# Patient Record
Sex: FEMALE | Race: ASIAN | NOT HISPANIC OR LATINO | URBAN - METROPOLITAN AREA
[De-identification: names, ages, dates, MRNs, and addresses within clinical notes are randomized per-mention and may not be internally consistent; named-entity substitution may affect disease eponyms.]

---

## 2017-07-21 ENCOUNTER — INPATIENT (INPATIENT)
Facility: HOSPITAL | Age: 72
LOS: 4 days | Discharge: NURSING, REHAB OR LONG-TERM CARE | DRG: 871 | End: 2017-07-26
Attending: FAMILY MEDICINE | Admitting: FAMILY MEDICINE
Payer: MEDICARE

## 2017-07-21 VITALS
HEART RATE: 150 BPM | SYSTOLIC BLOOD PRESSURE: 124 MMHG | OXYGEN SATURATION: 90 % | TEMPERATURE: 100 F | RESPIRATION RATE: 38 BRPM | DIASTOLIC BLOOD PRESSURE: 74 MMHG

## 2017-07-21 DIAGNOSIS — R11.10 VOMITING, UNSPECIFIED: ICD-10-CM

## 2017-07-21 DIAGNOSIS — R50.9 FEVER, UNSPECIFIED: ICD-10-CM

## 2017-07-21 DIAGNOSIS — A41.9 SEPSIS, UNSPECIFIED ORGANISM: ICD-10-CM

## 2017-07-21 DIAGNOSIS — K59.00 CONSTIPATION, UNSPECIFIED: ICD-10-CM

## 2017-07-21 DIAGNOSIS — J18.9 PNEUMONIA, UNSPECIFIED ORGANISM: ICD-10-CM

## 2017-07-21 DIAGNOSIS — R06.00 DYSPNEA, UNSPECIFIED: ICD-10-CM

## 2017-07-21 DIAGNOSIS — J96.90 RESPIRATORY FAILURE, UNSPECIFIED, UNSPECIFIED WHETHER WITH HYPOXIA OR HYPERCAPNIA: ICD-10-CM

## 2017-07-21 DIAGNOSIS — D72.829 ELEVATED WHITE BLOOD CELL COUNT, UNSPECIFIED: ICD-10-CM

## 2017-07-21 LAB
ALBUMIN SERPL ELPH-MCNC: 2.6 G/DL — LOW (ref 3.3–5)
ALBUMIN SERPL ELPH-MCNC: 3.6 G/DL — SIGNIFICANT CHANGE UP (ref 3.3–5)
ALP SERPL-CCNC: 67 U/L — SIGNIFICANT CHANGE UP (ref 40–120)
ALP SERPL-CCNC: 99 U/L — SIGNIFICANT CHANGE UP (ref 40–120)
ALT FLD-CCNC: 29 U/L — SIGNIFICANT CHANGE UP (ref 12–78)
ALT FLD-CCNC: 38 U/L — SIGNIFICANT CHANGE UP (ref 12–78)
ANION GAP SERPL CALC-SCNC: 15 MMOL/L — SIGNIFICANT CHANGE UP (ref 5–17)
ANION GAP SERPL CALC-SCNC: 7 MMOL/L — SIGNIFICANT CHANGE UP (ref 5–17)
APPEARANCE UR: ABNORMAL
AST SERPL-CCNC: 38 U/L — HIGH (ref 15–37)
AST SERPL-CCNC: 42 U/L — HIGH (ref 15–37)
BACTERIA # UR AUTO: ABNORMAL
BASE EXCESS BLDA CALC-SCNC: -11.2 MMOL/L — LOW (ref -2–2)
BASE EXCESS BLDA CALC-SCNC: -7 MMOL/L — LOW (ref -2–2)
BASOPHILS # BLD AUTO: 0 K/UL — SIGNIFICANT CHANGE UP (ref 0–0.2)
BASOPHILS # BLD AUTO: 0.1 K/UL — SIGNIFICANT CHANGE UP (ref 0–0.2)
BASOPHILS NFR BLD AUTO: 0.4 % — SIGNIFICANT CHANGE UP (ref 0–2)
BASOPHILS NFR BLD AUTO: 0.4 % — SIGNIFICANT CHANGE UP (ref 0–2)
BILIRUB SERPL-MCNC: 0.9 MG/DL — SIGNIFICANT CHANGE UP (ref 0.2–1.2)
BILIRUB SERPL-MCNC: 1.1 MG/DL — SIGNIFICANT CHANGE UP (ref 0.2–1.2)
BILIRUB UR-MCNC: NEGATIVE — SIGNIFICANT CHANGE UP
BLOOD GAS COMMENTS ARTERIAL: SIGNIFICANT CHANGE UP
BLOOD GAS COMMENTS ARTERIAL: SIGNIFICANT CHANGE UP
BUN SERPL-MCNC: 14 MG/DL — SIGNIFICANT CHANGE UP (ref 7–23)
BUN SERPL-MCNC: 19 MG/DL — SIGNIFICANT CHANGE UP (ref 7–23)
CALCIUM SERPL-MCNC: 7.1 MG/DL — LOW (ref 8.5–10.1)
CALCIUM SERPL-MCNC: 8.6 MG/DL — SIGNIFICANT CHANGE UP (ref 8.5–10.1)
CHLORIDE SERPL-SCNC: 111 MMOL/L — HIGH (ref 96–108)
CHLORIDE SERPL-SCNC: 115 MMOL/L — HIGH (ref 96–108)
CK MB BLD-MCNC: 2.5 % — SIGNIFICANT CHANGE UP (ref 0–3.5)
CK MB CFR SERPL CALC: 13 NG/ML — HIGH (ref 0–3.6)
CK SERPL-CCNC: 333 U/L — HIGH (ref 26–192)
CK SERPL-CCNC: 514 U/L — HIGH (ref 26–192)
CO2 SERPL-SCNC: 16 MMOL/L — LOW (ref 22–31)
CO2 SERPL-SCNC: 22 MMOL/L — SIGNIFICANT CHANGE UP (ref 22–31)
COLOR SPEC: YELLOW — SIGNIFICANT CHANGE UP
CREAT SERPL-MCNC: 0.88 MG/DL — SIGNIFICANT CHANGE UP (ref 0.5–1.3)
CREAT SERPL-MCNC: 1.3 MG/DL — SIGNIFICANT CHANGE UP (ref 0.5–1.3)
DIFF PNL FLD: ABNORMAL
EOSINOPHIL # BLD AUTO: 0 K/UL — SIGNIFICANT CHANGE UP (ref 0–0.5)
EOSINOPHIL # BLD AUTO: 0 K/UL — SIGNIFICANT CHANGE UP (ref 0–0.5)
EOSINOPHIL NFR BLD AUTO: 0 % — SIGNIFICANT CHANGE UP (ref 0–6)
EOSINOPHIL NFR BLD AUTO: 0.3 % — SIGNIFICANT CHANGE UP (ref 0–6)
EPI CELLS # UR: SIGNIFICANT CHANGE UP
GLUCOSE SERPL-MCNC: 52 MG/DL — LOW (ref 70–99)
GLUCOSE SERPL-MCNC: 78 MG/DL — SIGNIFICANT CHANGE UP (ref 70–99)
GLUCOSE UR QL: NEGATIVE — SIGNIFICANT CHANGE UP
GRAM STN FLD: SIGNIFICANT CHANGE UP
HCO3 BLDA-SCNC: 16 MMOL/L — LOW (ref 23–27)
HCO3 BLDA-SCNC: 20 MMOL/L — LOW (ref 23–27)
HCT VFR BLD CALC: 33.6 % — LOW (ref 34.5–45)
HCT VFR BLD CALC: 45.1 % — HIGH (ref 34.5–45)
HGB BLD-MCNC: 10.8 G/DL — LOW (ref 11.5–15.5)
HGB BLD-MCNC: 14.7 G/DL — SIGNIFICANT CHANGE UP (ref 11.5–15.5)
HOROWITZ INDEX BLDA+IHG-RTO: 100 — SIGNIFICANT CHANGE UP
HOROWITZ INDEX BLDA+IHG-RTO: 30 — SIGNIFICANT CHANGE UP
INR BLD: 1.18 RATIO — HIGH (ref 0.88–1.16)
KETONES UR-MCNC: NEGATIVE — SIGNIFICANT CHANGE UP
LACTATE SERPL-SCNC: 1.9 MMOL/L — SIGNIFICANT CHANGE UP (ref 0.7–2)
LACTATE SERPL-SCNC: 2.4 MMOL/L — HIGH (ref 0.7–2)
LACTATE SERPL-SCNC: 3.2 MMOL/L — HIGH (ref 0.7–2)
LACTATE SERPL-SCNC: 6.9 MMOL/L — CRITICAL HIGH (ref 0.7–2)
LEUKOCYTE ESTERASE UR-ACNC: ABNORMAL
LYMPHOCYTES # BLD AUTO: 1 K/UL — SIGNIFICANT CHANGE UP (ref 1–3.3)
LYMPHOCYTES # BLD AUTO: 1.8 K/UL — SIGNIFICANT CHANGE UP (ref 1–3.3)
LYMPHOCYTES # BLD AUTO: 18.4 % — SIGNIFICANT CHANGE UP (ref 13–44)
LYMPHOCYTES # BLD AUTO: 5.6 % — LOW (ref 13–44)
MAGNESIUM SERPL-MCNC: 2.2 MG/DL — SIGNIFICANT CHANGE UP (ref 1.6–2.6)
MCHC RBC-ENTMCNC: 27.9 PG — SIGNIFICANT CHANGE UP (ref 27–34)
MCHC RBC-ENTMCNC: 28.2 PG — SIGNIFICANT CHANGE UP (ref 27–34)
MCHC RBC-ENTMCNC: 32.2 GM/DL — SIGNIFICANT CHANGE UP (ref 32–36)
MCHC RBC-ENTMCNC: 32.6 GM/DL — SIGNIFICANT CHANGE UP (ref 32–36)
MCV RBC AUTO: 86.4 FL — SIGNIFICANT CHANGE UP (ref 80–100)
MCV RBC AUTO: 86.8 FL — SIGNIFICANT CHANGE UP (ref 80–100)
MONOCYTES # BLD AUTO: 0.9 K/UL — SIGNIFICANT CHANGE UP (ref 0–0.9)
MONOCYTES # BLD AUTO: 1 K/UL — HIGH (ref 0–0.9)
MONOCYTES NFR BLD AUTO: 10.1 % — HIGH (ref 1–9)
MONOCYTES NFR BLD AUTO: 5.1 % — SIGNIFICANT CHANGE UP (ref 1–9)
NEUTROPHILS # BLD AUTO: 16.4 K/UL — HIGH (ref 1.8–7.4)
NEUTROPHILS # BLD AUTO: 7 K/UL — SIGNIFICANT CHANGE UP (ref 1.8–7.4)
NEUTROPHILS NFR BLD AUTO: 70.8 % — SIGNIFICANT CHANGE UP (ref 43–77)
NEUTROPHILS NFR BLD AUTO: 88.9 % — HIGH (ref 43–77)
NITRITE UR-MCNC: NEGATIVE — SIGNIFICANT CHANGE UP
NT-PROBNP SERPL-SCNC: 932 PG/ML — HIGH (ref 0–125)
OB PNL STL: POSITIVE
PCO2 BLDA: 25 MMHG — LOW (ref 32–46)
PCO2 BLDA: 33 MMHG — SIGNIFICANT CHANGE UP (ref 32–46)
PH BLDA: 7.26 — LOW (ref 7.35–7.45)
PH BLDA: 7.43 — SIGNIFICANT CHANGE UP (ref 7.35–7.45)
PH UR: 9 — HIGH (ref 5–8)
PHOSPHATE SERPL-MCNC: 2.4 MG/DL — LOW (ref 2.5–4.5)
PLATELET # BLD AUTO: 188 K/UL — SIGNIFICANT CHANGE UP (ref 150–400)
PLATELET # BLD AUTO: 330 K/UL — SIGNIFICANT CHANGE UP (ref 150–400)
PO2 BLDA: 137 MMHG — HIGH (ref 74–108)
PO2 BLDA: 421 MMHG — HIGH (ref 74–108)
POTASSIUM SERPL-MCNC: 3.5 MMOL/L — SIGNIFICANT CHANGE UP (ref 3.5–5.3)
POTASSIUM SERPL-MCNC: 4 MMOL/L — SIGNIFICANT CHANGE UP (ref 3.5–5.3)
POTASSIUM SERPL-SCNC: 3.5 MMOL/L — SIGNIFICANT CHANGE UP (ref 3.5–5.3)
POTASSIUM SERPL-SCNC: 4 MMOL/L — SIGNIFICANT CHANGE UP (ref 3.5–5.3)
PROCALCITONIN SERPL-MCNC: 0.31 NG/ML — HIGH (ref 0–0.04)
PROT SERPL-MCNC: 6.1 G/DL — SIGNIFICANT CHANGE UP (ref 6–8.3)
PROT SERPL-MCNC: 8.5 G/DL — HIGH (ref 6–8.3)
PROT UR-MCNC: 75 MG/DL
PROTHROM AB SERPL-ACNC: 12.9 SEC — HIGH (ref 9.8–12.7)
RBC # BLD: 3.86 M/UL — SIGNIFICANT CHANGE UP (ref 3.8–5.2)
RBC # BLD: 5.22 M/UL — HIGH (ref 3.8–5.2)
RBC # FLD: 13.8 % — SIGNIFICANT CHANGE UP (ref 10.3–14.5)
RBC # FLD: 14.1 % — SIGNIFICANT CHANGE UP (ref 10.3–14.5)
RBC CASTS # UR COMP ASSIST: SIGNIFICANT CHANGE UP /HPF (ref 0–4)
SAO2 % BLDA: 100 % — HIGH (ref 92–96)
SAO2 % BLDA: 99 % — HIGH (ref 92–96)
SODIUM SERPL-SCNC: 142 MMOL/L — SIGNIFICANT CHANGE UP (ref 135–145)
SODIUM SERPL-SCNC: 144 MMOL/L — SIGNIFICANT CHANGE UP (ref 135–145)
SP GR SPEC: 1.01 — SIGNIFICANT CHANGE UP (ref 1.01–1.02)
SPECIMEN SOURCE: SIGNIFICANT CHANGE UP
TRI-PHOS CRY UR QL COMP ASSIST: ABNORMAL
TROPONIN I SERPL-MCNC: 0.03 NG/ML — SIGNIFICANT CHANGE UP (ref 0.01–0.04)
TROPONIN I SERPL-MCNC: <.015 NG/ML — SIGNIFICANT CHANGE UP (ref 0.01–0.04)
UROBILINOGEN FLD QL: NEGATIVE — SIGNIFICANT CHANGE UP
WBC # BLD: 18.4 K/UL — HIGH (ref 3.8–10.5)
WBC # BLD: 9.8 K/UL — SIGNIFICANT CHANGE UP (ref 3.8–10.5)
WBC # FLD AUTO: 18.4 K/UL — HIGH (ref 3.8–10.5)
WBC # FLD AUTO: 9.8 K/UL — SIGNIFICANT CHANGE UP (ref 3.8–10.5)
WBC UR QL: SIGNIFICANT CHANGE UP

## 2017-07-21 PROCEDURE — 99291 CRITICAL CARE FIRST HOUR: CPT

## 2017-07-21 PROCEDURE — 71250 CT THORAX DX C-: CPT | Mod: 26

## 2017-07-21 PROCEDURE — 71010: CPT | Mod: 26

## 2017-07-21 PROCEDURE — 99292 CRITICAL CARE ADDL 30 MIN: CPT

## 2017-07-21 PROCEDURE — 93010 ELECTROCARDIOGRAM REPORT: CPT

## 2017-07-21 PROCEDURE — 74000: CPT | Mod: 26

## 2017-07-21 PROCEDURE — 74176 CT ABD & PELVIS W/O CONTRAST: CPT | Mod: 26

## 2017-07-21 RX ORDER — SODIUM CHLORIDE 9 MG/ML
1000 INJECTION INTRAMUSCULAR; INTRAVENOUS; SUBCUTANEOUS ONCE
Qty: 0 | Refills: 0 | Status: COMPLETED | OUTPATIENT
Start: 2017-07-21 | End: 2017-07-21

## 2017-07-21 RX ORDER — DEXTROSE 50 % IN WATER 50 %
25 SYRINGE (ML) INTRAVENOUS ONCE
Qty: 0 | Refills: 0 | Status: DISCONTINUED | OUTPATIENT
Start: 2017-07-21 | End: 2017-07-26

## 2017-07-21 RX ORDER — ALBUTEROL 90 UG/1
2 AEROSOL, METERED ORAL EVERY 6 HOURS
Qty: 0 | Refills: 0 | Status: DISCONTINUED | OUTPATIENT
Start: 2017-07-21 | End: 2017-07-22

## 2017-07-21 RX ORDER — DEXTROSE 10 % IN WATER 10 %
500 INTRAVENOUS SOLUTION INTRAVENOUS
Qty: 0 | Refills: 0 | Status: DISCONTINUED | OUTPATIENT
Start: 2017-07-21 | End: 2017-07-22

## 2017-07-21 RX ORDER — PANTOPRAZOLE SODIUM 20 MG/1
40 TABLET, DELAYED RELEASE ORAL EVERY 12 HOURS
Qty: 0 | Refills: 0 | Status: DISCONTINUED | OUTPATIENT
Start: 2017-07-21 | End: 2017-07-24

## 2017-07-21 RX ORDER — SCOPALAMINE 1 MG/3D
1.5 PATCH, EXTENDED RELEASE TRANSDERMAL
Qty: 0 | Refills: 0 | Status: DISCONTINUED | OUTPATIENT
Start: 2017-07-21 | End: 2017-07-21

## 2017-07-21 RX ORDER — DEXTROSE 50 % IN WATER 50 %
12.5 SYRINGE (ML) INTRAVENOUS ONCE
Qty: 0 | Refills: 0 | Status: DISCONTINUED | OUTPATIENT
Start: 2017-07-21 | End: 2017-07-26

## 2017-07-21 RX ORDER — DEXTROSE 50 % IN WATER 50 %
12.5 SYRINGE (ML) INTRAVENOUS ONCE
Qty: 0 | Refills: 0 | Status: COMPLETED | OUTPATIENT
Start: 2017-07-21 | End: 2017-07-21

## 2017-07-21 RX ORDER — SODIUM CHLORIDE 9 MG/ML
1000 INJECTION, SOLUTION INTRAVENOUS
Qty: 0 | Refills: 0 | Status: DISCONTINUED | OUTPATIENT
Start: 2017-07-21 | End: 2017-07-24

## 2017-07-21 RX ORDER — IOHEXOL 300 MG/ML
500 INJECTION, SOLUTION INTRAVENOUS
Qty: 0 | Refills: 0 | Status: COMPLETED | OUTPATIENT
Start: 2017-07-21 | End: 2017-07-21

## 2017-07-21 RX ORDER — PIPERACILLIN AND TAZOBACTAM 4; .5 G/20ML; G/20ML
3.38 INJECTION, POWDER, LYOPHILIZED, FOR SOLUTION INTRAVENOUS ONCE
Qty: 0 | Refills: 0 | Status: COMPLETED | OUTPATIENT
Start: 2017-07-21 | End: 2017-07-21

## 2017-07-21 RX ORDER — FENTANYL CITRATE 50 UG/ML
1 INJECTION INTRAVENOUS
Qty: 0 | Refills: 0 | Status: DISCONTINUED | OUTPATIENT
Start: 2017-07-21 | End: 2017-07-26

## 2017-07-21 RX ORDER — DEXTROSE 50 % IN WATER 50 %
25 SYRINGE (ML) INTRAVENOUS ONCE
Qty: 0 | Refills: 0 | Status: COMPLETED | OUTPATIENT
Start: 2017-07-21 | End: 2017-07-21

## 2017-07-21 RX ORDER — ACETAMINOPHEN 500 MG
650 TABLET ORAL ONCE
Qty: 0 | Refills: 0 | Status: COMPLETED | OUTPATIENT
Start: 2017-07-21 | End: 2017-07-21

## 2017-07-21 RX ORDER — VANCOMYCIN HCL 1 G
1000 VIAL (EA) INTRAVENOUS ONCE
Qty: 0 | Refills: 0 | Status: COMPLETED | OUTPATIENT
Start: 2017-07-21 | End: 2017-07-21

## 2017-07-21 RX ORDER — VANCOMYCIN HCL 1 G
1000 VIAL (EA) INTRAVENOUS EVERY 12 HOURS
Qty: 0 | Refills: 0 | Status: DISCONTINUED | OUTPATIENT
Start: 2017-07-21 | End: 2017-07-21

## 2017-07-21 RX ORDER — SODIUM CHLORIDE 0.65 %
1 AEROSOL, SPRAY (ML) NASAL THREE TIMES A DAY
Qty: 0 | Refills: 0 | Status: DISCONTINUED | OUTPATIENT
Start: 2017-07-21 | End: 2017-07-26

## 2017-07-21 RX ORDER — PIPERACILLIN AND TAZOBACTAM 4; .5 G/20ML; G/20ML
3.38 INJECTION, POWDER, LYOPHILIZED, FOR SOLUTION INTRAVENOUS EVERY 8 HOURS
Qty: 0 | Refills: 0 | Status: COMPLETED | OUTPATIENT
Start: 2017-07-21 | End: 2017-07-25

## 2017-07-21 RX ORDER — PANTOPRAZOLE SODIUM 20 MG/1
8 TABLET, DELAYED RELEASE ORAL
Qty: 80 | Refills: 0 | Status: DISCONTINUED | OUTPATIENT
Start: 2017-07-21 | End: 2017-07-21

## 2017-07-21 RX ORDER — LACTOBACILLUS ACIDOPHILUS 100MM CELL
1 CAPSULE ORAL EVERY 8 HOURS
Qty: 0 | Refills: 0 | Status: DISCONTINUED | OUTPATIENT
Start: 2017-07-21 | End: 2017-07-26

## 2017-07-21 RX ORDER — SODIUM CHLORIDE 9 MG/ML
1000 INJECTION, SOLUTION INTRAVENOUS
Qty: 0 | Refills: 0 | Status: DISCONTINUED | OUTPATIENT
Start: 2017-07-21 | End: 2017-07-21

## 2017-07-21 RX ORDER — POLYETHYLENE GLYCOL 3350 17 G/17G
17 POWDER, FOR SOLUTION ORAL DAILY
Qty: 0 | Refills: 0 | Status: DISCONTINUED | OUTPATIENT
Start: 2017-07-21 | End: 2017-07-26

## 2017-07-21 RX ADMIN — Medication 1 DROP(S): at 17:03

## 2017-07-21 RX ADMIN — Medication 10 MILLIGRAM(S): at 19:42

## 2017-07-21 RX ADMIN — PIPERACILLIN AND TAZOBACTAM 200 GRAM(S): 4; .5 INJECTION, POWDER, LYOPHILIZED, FOR SOLUTION INTRAVENOUS at 12:24

## 2017-07-21 RX ADMIN — SODIUM CHLORIDE 2000 MILLILITER(S): 9 INJECTION INTRAMUSCULAR; INTRAVENOUS; SUBCUTANEOUS at 14:50

## 2017-07-21 RX ADMIN — FENTANYL CITRATE 1 PATCH: 50 INJECTION INTRAVENOUS at 16:35

## 2017-07-21 RX ADMIN — ALBUTEROL 2 PUFF(S): 90 AEROSOL, METERED ORAL at 19:39

## 2017-07-21 RX ADMIN — Medication 1 ENEMA: at 19:42

## 2017-07-21 RX ADMIN — SODIUM CHLORIDE 1000 MILLILITER(S): 9 INJECTION INTRAMUSCULAR; INTRAVENOUS; SUBCUTANEOUS at 12:23

## 2017-07-21 RX ADMIN — Medication 50 MILLILITER(S): at 19:41

## 2017-07-21 RX ADMIN — Medication 1 TABLET(S): at 20:27

## 2017-07-21 RX ADMIN — Medication 250 MILLIGRAM(S): at 12:24

## 2017-07-21 RX ADMIN — Medication 12.5 GRAM(S): at 21:49

## 2017-07-21 RX ADMIN — PIPERACILLIN AND TAZOBACTAM 25 GRAM(S): 4; .5 INJECTION, POWDER, LYOPHILIZED, FOR SOLUTION INTRAVENOUS at 19:02

## 2017-07-21 RX ADMIN — Medication 650 MILLIGRAM(S): at 12:00

## 2017-07-21 RX ADMIN — IOHEXOL 500 MILLILITER(S): 300 INJECTION, SOLUTION INTRAVENOUS at 16:39

## 2017-07-21 RX ADMIN — PANTOPRAZOLE SODIUM 40 MILLIGRAM(S): 20 TABLET, DELAYED RELEASE ORAL at 17:03

## 2017-07-21 RX ADMIN — SODIUM CHLORIDE 100 MILLILITER(S): 9 INJECTION, SOLUTION INTRAVENOUS at 15:50

## 2017-07-21 RX ADMIN — Medication 25 GRAM(S): at 17:52

## 2017-07-21 NOTE — H&P ADULT - HISTORY OF PRESENT ILLNESS
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 Per  over the past two weeks the patient has had progressive difficulty with PEG tube feeding. The patient has vomited several times over the last two weeks and had drainage of dark fluid from the PEG tube described as coffee grounds. The stomach was decompressed earlier this week at the nursing home with a vaughn catheter via the PEG tube. The last episode of vomiting was last night. The patient began to have progressive difficulty of breath earlier today, which led the use of AMBU bag for ventilation and subsequent transfer to the ED

## 2017-07-21 NOTE — ED PROVIDER NOTE - OBJECTIVE STATEMENT
71 female presents to ER by ambulance from [facility] with report of respiratory distress.  at the bedside states patient had vomited 10 days ago, had her g-tube feedings held, was re-started recently and yesterday noted to have difficulty breathing, continued today with low o2 sats and patient had to be bagged.

## 2017-07-21 NOTE — H&P ADULT - PMH
Aphasic stroke    Dementia of frontal lobe type    Diabetes mellitus    Hypertension    Respiratory failure Aphasic stroke    Constipation    Dementia of frontal lobe type    Diabetes mellitus    GERD (gastroesophageal reflux disease)    Hypertension    Respiratory failure    Respiratory failure

## 2017-07-21 NOTE — ED PROVIDER NOTE - PMH
Aphasic stroke    Dementia of frontal lobe type    Diabetes mellitus    Hypertension    Respiratory failure

## 2017-07-21 NOTE — PROVIDER CONTACT NOTE (OTHER) - ACTION/TREATMENT ORDERED:
Dr Foster notified of assessment.  D50W 1 amp(25grams) given as ordered.  NS .9 % 1 L bolus given as ordered.  will closely and contiguously monitor

## 2017-07-21 NOTE — H&P ADULT - NSHPPHYSICALEXAM_GEN_ALL_CORE
· Temp (F): 100.4 Temp (C) Temp (C): 38 Temp site Temp Site: rectal  · Heart Rate Heart Rate (beats/min): 150  · BP Systolic Systolic: 124 BP Diastolic Diastolic (mm Hg): 74  · Respiration Rate (breaths/min) Respiration Rate (breaths/min): 38  SpO2 (%) SpO2 (%): 87 O2 delivery Patient On: supplemental O2 SpO2 (%) SpO2 (%): 87 O2 delivery Patient On: supplemental O2  · Appearance: ILL APPEARING  · Development: well developed  · Distress: MODERATE  · Manner: appropriate for situation  · Mentation: ALTERED LOC  · Mood: AGITATED  · Nourishment: THIN  · HEAD: Head atraumatic, normal cephalic shape.  · HEAD: Head is atraumatic. Head shape is symmetrical.  · EYES: Clear bilaterally, pupils equal, round and reactive to light.  · CARDIAC: - - -  · Cardiac Rhythm: IRREGULAR  · Cardiac Rate: TACHYCARDIC  · Heart Sounds: S1-S2  · Pedal Edema: absent  · Capillary Refill: less than or equal to 2 seconds  · Cardiac Pulses: normal bilaterally  · RESPIRATORY: - - -  · Breath Sounds: RHONCHI  · Rhonchi: DIFFUSE  · GASTROINTESTINAL: Abdomen soft, non-tender, no guarding, g-tube in place  · MUSCULOSKELETAL: - - -  · Musculoskeletal Exam: left arm contracted  · Weight Bearing: UNABLE  · Calf Tenderness: none  · NEUROLOGICAL: - - -  · Level of Consciousness: awake, not following commands  · Neck: normal  · Speech: APHASIC  · Gait and Weight Bearing: UNABLE  · SKIN: Skin normal color for race, warm, dry and intact. No evidence of rash.

## 2017-07-21 NOTE — ED ADULT NURSE NOTE - OBJECTIVE STATEMENT
pt sent from [facility] for r/o sepsis. pt arrived via EMS on ventilator. pts face is flushed and she was tachypnic at 32. pt is diaphoretic. pt came with chronic vaughn. pt rectal temp 100.4

## 2017-07-21 NOTE — CONSULT NOTE ADULT - SUBJECTIVE AND OBJECTIVE BOX
Infectious Diseases Consult by Alejandrina Brantley MD    Reason for Consult :Possible severe sepsis        HPI: The patient is a 71 year old female with a history of dementia, tracheostomy, and gastrostomy, s/p aphasic stroke who is vent dependent and a resident at a nursing home. Per  over the past two weeks the patient has had progressive difficulty with PEG tube feeding. The patient has vomited several times over the last two weeks and had drainage of dark fluid from the PEG tube described as coffee grounds. The stomach was decompressed earlier this week at the nursing home with a vaughn catheter via the PEG tube. The last episode of vomiting was last night. The patient began to have progressive difficulty of breath earlier today, which led the use of AMBU bag for ventilation and subsequent transfer to the ED.     In ED given Vancomycin, Zosyn, two liters of fluids, and PEG tube connected to drainage. Records were reviewed from the nursing home and the patient was transferred to the ICU for further care. She had vaughn placed and over 3000 cc urine drianed         Past Medical & Surgical Hx:  PAST MEDICAL & SURGICAL HISTORY:  Hypertension  Respiratory failure  Diabetes mellitus  Aphasic stroke  Dementia of frontal lobe type  Tracheostomy in place  Gastrostomy in place  Hx of appendectomy      Social History--  , lives in SNF long-term resident   EtOH: denies   Tobacco: denies ***  Drug Use: denies ***    FAMILY HISTORY:      Allergies    codeine (Hives)    Intolerances        Home/ Out patient  Medications :  · 	ferrous    sulfate, 330 milliGRAM(s), Oral via PEG tube, daily  	Special Instructions: elixir  	Administration Instructions: Contains 65mG elemental iron  · 	fluticasone propionate 50 MICROgram(s)/spray Nasal Spray, [Ordered as FLONASE]  	1 Spray(s), Both Nostrils, at bedtime  · 	freetext home medication -, novolog insulin  	Special Instructions: sliding scale  · 	guaiFENesin    Syrup, [Ordered as ROBITUSSIN]  	200 milliGRAM(s), Oral via PEG tube, at bedtime  · 	chlorhexidine 0.12% Liquid, [Ordered as Peridex]  	10 milliLiter(s), Swish and Spit, two times a day  · 	freetext home medication -, transderm patch, Topical, every 3 days  	Special Instructions: 1.5 mg  · 	artificial tears (preservative free) Ophthalmic Solution, [Known as Refresh Classic Eye Drops]  	2 Drop(s), Both EYES, two times a day  	Administration Instructions: for ophthalmic use  · 	famotidine    Tablet, [Ordered as PEPCID]  	20 milliGRAM(s), Oral via PEG tube, two times a day  	Special Instructions: crush  · 	sodium chloride 0.65% Nasal, [Known as OCEAN]  	2 Spray(s), Both Nostrils, two times a day  · 	fentaNYL   Patch  12 MICROgram(s)/Hr, [Known as DURAGESIC Patch  12 MICROgram(s)/Hr]  	1 Patch, Transdermal, every 72 hours  	Administration Instructions: external use only  · 	furosemide    Tablet, [Ordered as LASIX]  	20 milliGRAM(s), Oral via PEG tube, daily  · 	ipratropium 17 MICROgram(s) HFA Inhaler, {Ordered as ATROVENT HFA}  	2 Puff(s), Inhalation, every 6 hours  · 	sucralfate, [Ordered as CARAFATE] Gram(s), Oral, every 6 hours  	Special Instructions: dose is listed as 10 ml for 3 days  · 	freetext home medication -, prevacid  	30 mG via PEG tube, daily  	Special Instructions: for 3 days  · 	sucralfate suspension, [Ordered as CARAFATE Suspension]  	1 Gram(s), Oral via PEG tube, every 6 hours  	Special Instructions: med sheet lists 10 ml as amount for 3 days, does not list dosage  	Administration Instructions: shake well    Current Inpatient Medications :    ANTIBIOTICS:   piperacillin/tazobactam IVPB. 3.375 Gram(s) IV Intermittent every 8 hours      OTHER RELEVANT MEDICATIONS :  sodium chloride 0.9% Bolus 1000 milliLiter(s) IV Bolus once  dextrose 5%. 1000 milliLiter(s) IV Continuous <Continuous>  dextrose 50% Injectable 12.5 Gram(s) IV Push once  dextrose 50% Injectable 25 Gram(s) IV Push once  dextrose 50% Injectable 25 Gram(s) IV Push once  pantoprazole Infusion 8 mG/Hr IV Continuous <Continuous>  artificial  tears Solution 1 Drop(s) Both EYES four times a day  ALBUTerol    90 MICROgram(s) HFA Inhaler 2 Puff(s) Inhalation every 6 hours  fentaNYL   Patch  12 MICROgram(s)/Hr 1 Patch Transdermal every 72 hours  polyethylene glycol 3350 17 Gram(s) Oral daily  sodium chloride 0.65% Nasal 1 Spray(s) Both Nostrils three times a day PRN  dextrose 5%. 1000 milliLiter(s) IV Continuous <Continuous>      ROS:  Unable to obtain due to : vegetative state      I&O's Detail      Physical Exam:  Vital Signs Last 24 Hrs  T(C): 37 (2017 15:05), Max: 38 (2017 11:15)  T(F): 98.6 (2017 15:05), Max: 100.4 (2017 11:15)  HR: 72 (2017 14:45) (72 - 162)  BP: 119/47 (2017 13:24) (113/69 - 172/93)  BP(mean): --  RR: 20 (2017 14:45) (19 - 38)  SpO2: 100% (2017 14:45) (87% - 100%)  Height (cm): 165 ( @ 14:50)  Weight (kg): 60 ( @ 14:50)  BMI (kg/m2): 22 ( @ 14:50)  BSA (m2): 1.66 ( @ 14:50)    General: in vegetative state, non verbal on vent.  Eyes: sclera anicteric, pupils equal and reactive to light  ENMT: buccal mucosa moist, pharynx not injected  Neck: supple, trachea midline  Lungs: clear, no wheeze/rhonchi  Cardiovascular: regular rate and rhythm, S1 S2  Abdomen: soft, nontender, mild distended no organomegaly present, bowel sounds normal  Neurological:  non verbal, vegetaive state   Skin: no increased ecchymosis/petechiae/purpura  Lymph Nodes: no palpable cervical/supraclavicular lymph nodes enlargements  Extremities: no cyanosis/clubbing/edema    Labs:         142  |  111<H>  |  19  ----------------------------<  78  4.0   |  16<L>  |  1.30    Ca    8.6      2017 11:45    TPro  8.5<H>  /  Alb  3.6  /  TBili  0.9  /  DBili  x   /  AST  42<H>  /  ALT  38  /  AlkPhos  99                            14.7   18.4  )-----------( 330      ( 2017 11:45 )             45.1       PT/INR - ( 2017 11:45 )   PT: 12.9 sec;   INR: 1.18 ratio           Urinalysis Basic - ( 2017 11:34 )    Color: Yellow / Appearance: Turbid / S.015 / pH: x  Gluc: x / Ketone: Negative  / Bili: Negative / Urobili: Negative   Blood: x / Protein: 75 mg/dL / Nitrite: Negative   Leuk Esterase: Moderate / RBC: 0-2 /HPF / WBC 0-2   Sq Epi: x / Non Sq Epi: x / Bacteria: Many      LIVER FUNCTIONS - ( 2017 11:45 )  Alb: 3.6 g/dL / Pro: 8.5 g/dL / ALK PHOS: 99 U/L / ALT: 38 U/L / AST: 42 U/L / GGT: x           CARDIAC MARKERS ( 2017 11:45 )  <.015 ng/mL / x     / 333 U/L / x     / x          CAPILLARY BLOOD GLUCOSE        ABG - ( 2017 11:40 )  pH: 7.26  /  pCO2: 33    /  pO2: 421   / HCO3: 16    / Base Excess: -11.2 /  SaO2: 100       Lactate, Blood (17 @ 13:42)    Lactate, Blood: 3.2 mmol/L    Lactate, Blood (17 @ 11:45)    Lactate, Blood: 6.9:         RECENT CULTURES:      RADIOLOGY & ADDITIONAL STUDIES:  Xray Abdomen 1 View PORTABLE -Urgent (17 @ 12:43) >    EXAM:  PORTABLE ABDOMEN                            PROCEDURE DATE:  2017          INTERPRETATION:  Portable supine abdominal view. 2 films obtained.   Patient has respiratory distress.    Gastrostomy is noted. Stomach is distended at this time and this has   increased from 2014.    Remaining abdominal gas pattern is grossly normal.    There is roughly symmetric degeneration of the outer corners of both   hips. No gross organomegaly is seen. Mild left lumbar curve noted.    IMPRESSION: Stomach distention.    < from: Xray Chest 1 View AP- PORTABLE-Urgent (17 @ 12:43) >  EXAM:  PORTABLE CHEST URGENT                            PROCEDURE DATE:  2017          INTERPRETATION:  AP chest on  at 12:07 PM. Patient has respiratory   distress.    Tracheostomy again noted. Patient's hand obscures left base.    Heart size is within normal limits.    Small atelectatic infiltrate at right base is similar to  as   is the chest.    IMPRESSION: Persistent small right base infiltrate.          Assessment :    71 year old female admitted with respiratory distress and possible GIB/sepsis with a history of tracheostomy, gastrostomy, s/p CVA who lives in a nursing home admitted with respiratory distress and hypotension. Had hypoglycemia . She remains at high risk for aspiration . She also at risk for UTI as she has a vaughn     she could have ileus or Shamika's syndrome causing recurrent ileus or pseudoobstruction  .    Plan :   - continue with IV Zosyn , no need for more vancomycin  pending cs results  - she is going for Ct scan of chest, abdomen and pelvis to rule out any obstruction   - trend CBC     Continue with present regime .  Appropriate use of antibiotics and adverse effects reviewed.      I have discussed the above plan of care with patient's family in detail. They expressed understanding of the treatment plan . Risks, benefits and alternatives discussed in detail. I have asked if they have any questions or concerns and appropriately addressed them to the best of my ability .      > 45 minutes spent in direct patient care reviewing  the notes, lab data/ imaging , discussion with multidisciplinary team. All questions were addressed and answered to the best of my capacity .    Dr. Olivas covering me  thru 17 .    Thank you for allowing me to participate in the care of your patient .      Alejandrina Brantley MD  [phone]

## 2017-07-21 NOTE — CONSULT NOTE ADULT - PROBLEM SELECTOR RECOMMENDATION 2
f/u CT to rule out obstruction  if no obstruction -- bowel regimen with colace, senna, miralax, lactulose  monitor for BMs

## 2017-07-21 NOTE — ED PROVIDER NOTE - CARE PLAN
Principal Discharge DX:	Respiratory distress Principal Discharge DX:	Respiratory distress  Secondary Diagnosis:	Sepsis, due to unspecified organism

## 2017-07-21 NOTE — ED PROVIDER NOTE - CRITICAL CARE PROVIDED
documentation/additional history taking/consult w/ pt's family directly relating to pts condition/interpretation of diagnostic studies/direct patient care (not related to procedure)/conducted a detailed discussion of DNR status/consultation with other physicians

## 2017-07-21 NOTE — ED PROVIDER NOTE - MEDICAL DECISION MAKING DETAILS
71 female in respiratory distress on vent, call respiratory f/u sepsis labs, ekg, chest xray, iv fluids,

## 2017-07-21 NOTE — H&P ADULT - NSHPLABSRESULTS_GEN_ALL_CORE
144  |  115<H>  |  14  ----------------------------<  52<L>  3.5   |  22  |  0.88    Ca    7.1<L>      2017 21:12  Phos  2.4       Mg     2.2         TPro  6.1  /  Alb  2.6<L>  /  TBili  1.1  /  DBili  x   /  AST  38<H>  /  ALT  29  /  AlkPhos  67                              10.8   9.8   )-----------( 188      ( 2017 21:11 )             33.6       CARDIAC MARKERS ( 2017 21:12 )  .034 ng/mL / x     / 514 U/L / x     / 13.0 ng/mL  CARDIAC MARKERS ( 2017 11:45 )  <.015 ng/mL / x     / 333 U/L / x     / x            LIVER FUNCTIONS - ( 2017 21:12 )  Alb: 2.6 g/dL / Pro: 6.1 g/dL / ALK PHOS: 67 U/L / ALT: 29 U/L / AST: 38 U/L / GGT: x             PT/INR - ( 2017 11:45 )   PT: 12.9 sec;   INR: 1.18 ratio             Urinalysis Basic - ( 2017 11:34 )    Color: Yellow / Appearance: Turbid / S.015 / pH: x  Gluc: x / Ketone: Negative  / Bili: Negative / Urobili: Negative   Blood: x / Protein: 75 mg/dL / Nitrite: Negative   Leuk Esterase: Moderate / RBC: 0-2 /HPF / WBC 0-2   Sq Epi: x / Non Sq Epi: x / Bacteria: Many      < from: Xray Chest 1 View AP- PORTABLE-Urgent (17 @ 12:43) >    EXAM:  PORTABLE CHEST URGENT                            PROCEDURE DATE:  2017          INTERPRETATION:  AP chest on  at 12:07 PM. Patient has respiratory   distress.    Tracheostomy again noted. Patient's hand obscures left base.    Heart size is within normal limits.    Small atelectatic infiltrate at right base is similar to  as   is the chest.    IMPRESSION: Persistent small right base infiltrate.      < end of copied text >

## 2017-07-21 NOTE — CONSULT NOTE ADULT - SUBJECTIVE AND OBJECTIVE BOX
Patient is a 71y old  Female who presents with a chief complaint of resp distress (2017 13:51)    HPI: The patient is a 71 year old female with a history of dementia, tracheostomy, and gastrostomy, s/p aphasic stroke who is vent dependent and a resident at a nursing home. Per  over the past two weeks the patient has had progressive difficulty with PEG tube feeding. The patient has vomited several times over the last two weeks and had drainage of dark fluid from the PEG tube described as coffee grounds. The stomach was decompressed earlier this week at the nursing home with a vaughn catheter via the PEG tube. The last episode of vomiting was last night. The patient began to have progressive difficulty of breath earlier today, which led the use of AMBU bag for ventilation and subsequent transfer to the ED.     In ED given Vancomycin, Zosyn, two liters of fluids, and PEG tube connected to drainage. Records were reviewed from the nursing home and the patient was transferred to the ICU for further care.     Allergies: codeine    PAST MEDICAL & SURGICAL HISTORY:  Hypertension  Respiratory failure  Diabetes mellitus  Aphasic stroke  Dementia of frontal lobe type  Tracheostomy in place  Gastrostomy in place  Hx of appendectomy    FAMILY HISTORY: noncontributory     SOCIAL HISTORY: resident in nursing home, ventilator dependend    Home Medications:  See MR    Review of Systems:  Unable to assess due to patient baseline status.    T(F): 100.4 (17 @ 11:30), Max: 100.4 (17 @ 11:15)  HR: 89 (17 @ 13:24) (89 - 162)  BP: 119/47 (17 @ 13:24) (113/69 - 172/93)  RR: 20 (17 @ 13:24) (19 - 38)  SpO2: 98% (17 @ 13:24)  Wt(kg): --  Mode: AC/ CMV (Assist Control/ Continuous Mandatory Ventilation), RR (machine): 20, TV (machine): 450, FiO2: 40, PEEP: 5    Physical Exam:     Gen: chronically ill appearing, tracheostomy, on ventilator   Neuro: withdraws to pain, awake but not alert,   HEENT: tracheostomy in place,   CVS: regular rate and rhtyhm  Resp: rhonchi  Abd: moderately distended, contracted, PEG tube in place with no surrounding erythema  Ext: pulses intact    Meds:  piperacillin/tazobactam IVPB. 3.375 Gram(s) IV Intermittent every 8 hours  vancomycin  IVPB 1000 milliGRAM(s) IV Intermittent every 12 hours  dextrose 50% Injectable 12.5 Gram(s) IV Push once  dextrose 50% Injectable 25 Gram(s) IV Push once  dextrose 50% Injectable 25 Gram(s) IV Push once  sodium chloride 0.9% Bolus 1000 milliLiter(s) IV Bolus once  dextrose 5%. 1000 milliLiter(s) IV Continuous <Continuous>  lactobacillus acidophilus 1 Tablet(s) Oral every 8 hours  iohexol 300 mG (iodine)/mL Oral Solution 30 milliLiter(s) Oral once                          14.7   18.4  )-----------( 330      ( 2017 11:45 )             45.1       142  |  111<H>  |  19  ----------------------------<  78  4.0   |  16<L>  |  1.30    Ca    8.6      2017 11:45  TPro  8.5<H>  /  Alb  3.6  /  TBili  0.9  /  DBili  x   /  AST  42<H>  /  ALT  38  /  AlkPhos  99    Lactate 3.2            @ 13:42  Lactate 6.9            @ 11:45    CARDIAC MARKERS ( 2017 11:45 )  <.015 ng/mL / x     / 333 U/L / x     / x      PT/INR - ( 2017 11:45 )   PT: 12.9 sec;   INR: 1.18 ratio      Urinalysis Basic - ( 2017 11:34 )    Color: Yellow / Appearance: Turbid / S.015 / pH: x  Gluc: x / Ketone: Negative  / Bili: Negative / Urobili: Negative   Blood: x / Protein: 75 mg/dL / Nitrite: Negative   Leuk Esterase: Moderate / RBC: 0-2 /HPF / WBC 0-2   Sq Epi: x / Non Sq Epi: x / Bacteria: Many    ABG - ( 2017 11:40 )  pH: 7.26  /  pCO2: 33    /  pO2: 421   / HCO3: 16    / Base Excess: -11.2 /  SaO2: 100       Radiology: < from: Xray Chest 1 View AP- PORTABLE-Urgent (17 @ 12:43) >  EXAM:  PORTABLE CHEST URGENT                          PROCEDURE DATE:  2017    INTERPRETATION:  AP chest on  at 12:07 PM. Patient has respiratory   distress.  Tracheostomy again noted. Patient's hand obscures left base.  Heart size is within normal limits.  Small atelectatic infiltrate at right base is similar to  as   is the chest.  IMPRESSION: Persistent small right base infiltrate.  < end of copied text >  < from: Xray Abdomen 1 View PORTABLE -Urgent (17 @ 12:43) >  EXAM:  PORTABLE ABDOMEN                          PROCEDURE DATE:  2017    INTERPRETATION:  Portable supine abdominal view. 2 films obtained.   Patient has respiratory distress.  Gastrostomy is noted. Stomach is distended at this time and this has   increased from 2014.  Remaining abdominal gas pattern is grossly normal.  There is roughly symmetric degeneration of the outer corners of both   hips. No gross organomegaly is seen. Mild left lumbar curve noted.  IMPRESSION: Stomach distention.  < end of copied text >    CODE STATUS: Full Code  GOC discussion: No  Critical care time spent (mins): Patient is a 71y old  Female who presents with a chief complaint of resp distress (2017 13:51)    HPI: The patient is a 71 year old female with a history of dementia, tracheostomy, and gastrostomy, s/p aphasic stroke who is vent dependent and a resident at a nursing home. Per  over the past two weeks the patient has had progressive difficulty with PEG tube feeding. The patient has vomited several times over the last two weeks and had drainage of dark fluid from the PEG tube described as coffee grounds. The stomach was decompressed earlier this week at the nursing home with a vaughn catheter via the PEG tube. The last episode of vomiting was last night. The patient began to have progressive difficulty of breath earlier today, which led the use of AMBU bag for ventilation and subsequent transfer to the ED.     In ED given Vancomycin, Zosyn, two liters of fluids, and PEG tube connected to drainage. Records were reviewed from the nursing home and the patient was transferred to the ICU for further care.     Allergies: codeine    PAST MEDICAL & SURGICAL HISTORY:  Hypertension  Respiratory failure  Diabetes mellitus  Aphasic stroke  Dementia of frontal lobe type  Tracheostomy in place  Gastrostomy in place  Hx of appendectomy    FAMILY HISTORY: noncontributory     SOCIAL HISTORY: resident in nursing home, ventilator dependend    Home Medications:  See MR    Review of Systems:  Unable to assess due to patient baseline status.    T(F): 100.4 (17 @ 11:30), Max: 100.4 (17 @ 11:15)  HR: 89 (17 @ 13:24) (89 - 162)  BP: 119/47 (17 @ 13:24) (113/69 - 172/93)  RR: 20 (17 @ 13:24) (19 - 38)  SpO2: 98% (17 @ 13:24)  Wt(kg): --  Mode: AC/ CMV (Assist Control/ Continuous Mandatory Ventilation), RR (machine): 20, TV (machine): 450, FiO2: 40, PEEP: 5    Physical Exam:     Gen: chronically ill appearing, tracheostomy, on ventilator   Neuro: withdraws to pain, awake but not alert,   HEENT: tracheostomy in place, PERRLA  CVS: regular rate and rhtyhm  Resp: rhonchi  Abd: moderately distended, contracted, PEG tube in place with no surrounding erythema  Ext: pulses intact    Meds:  piperacillin/tazobactam IVPB. 3.375 Gram(s) IV Intermittent every 8 hours  vancomycin  IVPB 1000 milliGRAM(s) IV Intermittent every 12 hours  dextrose 50% Injectable 12.5 Gram(s) IV Push once  dextrose 50% Injectable 25 Gram(s) IV Push once  dextrose 50% Injectable 25 Gram(s) IV Push once  sodium chloride 0.9% Bolus 1000 milliLiter(s) IV Bolus once  dextrose 5%. 1000 milliLiter(s) IV Continuous <Continuous>  lactobacillus acidophilus 1 Tablet(s) Oral every 8 hours  iohexol 300 mG (iodine)/mL Oral Solution 30 milliLiter(s) Oral once                          14.7   18.4  )-----------( 330      ( 2017 11:45 )             45.1     -  142  |  111<H>  |  19  ----------------------------<  78  4.0   |  16<L>  |  1.30    Ca    8.6      2017 11:45  TPro  8.5<H>  /  Alb  3.6  /  TBili  0.9  /  DBili  x   /  AST  42<H>  /  ALT  38  /  AlkPhos  99    Lactate 3.2            @ 13:42  Lactate 6.9            @ 11:45    CARDIAC MARKERS ( 2017 11:45 )  <.015 ng/mL / x     / 333 U/L / x     / x      PT/INR - ( 2017 11:45 )   PT: 12.9 sec;   INR: 1.18 ratio      Urinalysis Basic - ( 2017 11:34 )    Color: Yellow / Appearance: Turbid / S.015 / pH: x  Gluc: x / Ketone: Negative  / Bili: Negative / Urobili: Negative   Blood: x / Protein: 75 mg/dL / Nitrite: Negative   Leuk Esterase: Moderate / RBC: 0-2 /HPF / WBC 0-2   Sq Epi: x / Non Sq Epi: x / Bacteria: Many    ABG - ( 2017 11:40 )  pH: 7.26  /  pCO2: 33    /  pO2: 421   / HCO3: 16    / Base Excess: -11.2 /  SaO2: 100       Radiology: < from: Xray Chest 1 View AP- PORTABLE-Urgent (17 @ 12:43) >  EXAM:  PORTABLE CHEST URGENT                          PROCEDURE DATE:  2017    INTERPRETATION:  AP chest on  at 12:07 PM. Patient has respiratory   distress.  Tracheostomy again noted. Patient's hand obscures left base.  Heart size is within normal limits.  Small atelectatic infiltrate at right base is similar to  as   is the chest.  IMPRESSION: Persistent small right base infiltrate.  < end of copied text >  < from: Xray Abdomen 1 View PORTABLE -Urgent (17 @ 12:43) >  EXAM:  PORTABLE ABDOMEN                          PROCEDURE DATE:  2017    INTERPRETATION:  Portable supine abdominal view. 2 films obtained.   Patient has respiratory distress.  Gastrostomy is noted. Stomach is distended at this time and this has   increased from 2014.  Remaining abdominal gas pattern is grossly normal.  There is roughly symmetric degeneration of the outer corners of both   hips. No gross organomegaly is seen. Mild left lumbar curve noted.  IMPRESSION: Stomach distention.  < end of copied text >    CODE STATUS: Full Code  GOC discussion: No  Critical care time spent (mins): Patient is a 71y old  Female who presents with a chief complaint of resp distress (2017 13:51)    HPI: The patient is a 71 year old female with a history of dementia, tracheostomy, and gastrostomy, s/p aphasic stroke who is vent dependent and a resident at a nursing home. Per  over the past two weeks the patient has had progressive difficulty with PEG tube feeding. The patient has vomited several times over the last two weeks and had drainage of dark fluid from the PEG tube described as coffee grounds. The stomach was decompressed earlier this week at the nursing home with a vaughn catheter via the PEG tube. The last episode of vomiting was last night. The patient began to have progressive difficulty of breath earlier today, which led the use of AMBU bag for ventilation and subsequent transfer to the ED.     In ED given Vancomycin, Zosyn, two liters of fluids, and PEG tube connected to drainage. Records were reviewed from the nursing home. The patient was transferred to the ICU for further care.     Allergies: codeine    PAST MEDICAL & SURGICAL HISTORY:  Hypertension  Respiratory failure, chronic vent  Diabetes mellitus  Aphasic stroke  Dementia of frontal lobe type  Tracheostomy in place  Gastrostomy in place  Hx of appendectomy    FAMILY HISTORY: noncontributory     SOCIAL HISTORY: resident in nursing home, ventilator dependent    Home Medications: See [facility] rec    Review of Systems:  Unable to assess due to patient baseline status.    T(F): 100.4 (17 @ 11:30), Max: 100.4 (17 @ 11:15)  HR: 89 (17 @ 13:24) (89 - 162)  BP: 119/47 (17 @ 13:24) (113/69 - 172/93)  RR: 20 (17 @ 13:24) (19 - 38)  SpO2: 98% (17 @ 13:24)  Mode: AC/ CMV (Assist Control/ Continuous Mandatory Ventilation), RR (machine): 20, TV (machine): 450, FiO2: 40, PEEP: 5    Physical Exam:     Gen: chronically ill appearing, tracheostomy, on ventilator   Neuro: withdraws to pain, awake but not alert,   HEENT: tracheostomy in place, PERRLA  CVS: regular rate and rhtyhm  Resp: rhonchi  Abd: moderately distended, contracted, PEG tube in place with no surrounding erythema  Ext: pulses intact  rectal: trace amount brown stool, normal tone    Meds:  piperacillin/tazobactam IVPB. 3.375 Gram(s) IV Intermittent every 8 hours  vancomycin  IVPB 1000 milliGRAM(s) IV Intermittent every 12 hours  dextrose 50% Injectable 12.5 Gram(s) IV Push once  dextrose 50% Injectable 25 Gram(s) IV Push once  dextrose 50% Injectable 25 Gram(s) IV Push once  sodium chloride 0.9% Bolus 1000 milliLiter(s) IV Bolus once  dextrose 5%. 1000 milliLiter(s) IV Continuous <Continuous>  lactobacillus acidophilus 1 Tablet(s) Oral every 8 hours  iohexol 300 mG (iodine)/mL Oral Solution 30 milliLiter(s) Oral once                          14.7   18.4  )-----------( 330      ( 2017 11:45 )             45.1     -  142  |  111<H>  |  19  ----------------------------<  78  4.0   |  16<L>  |  1.30    Ca    8.6      2017 11:45  TPro  8.5<H>  /  Alb  3.6  /  TBili  0.9  /  DBili  x   /  AST  42<H>  /  ALT  38  /  AlkPhos  99    Lactate 3.2            @ 13:42  Lactate 6.9            @ 11:45    CARDIAC MARKERS ( 2017 11:45 )  <.015 ng/mL / x     / 333 U/L / x     / x      PT/INR - ( 2017 11:45 )   PT: 12.9 sec;   INR: 1.18 ratio      Urinalysis Basic - ( 2017 11:34 )    Color: Yellow / Appearance: Turbid / S.015 / pH: x  Gluc: x / Ketone: Negative  / Bili: Negative / Urobili: Negative   Blood: x / Protein: 75 mg/dL / Nitrite: Negative   Leuk Esterase: Moderate / RBC: 0-2 /HPF / WBC 0-2   Sq Epi: x / Non Sq Epi: x / Bacteria: Many    ABG - ( 2017 11:40 )  pH: 7.26  /  pCO2: 33    /  pO2: 421   / HCO3: 16    / Base Excess: -11.2 /  SaO2: 100       Radiology: < from: Xray Chest 1 View AP- PORTABLE-Urgent (17 @ 12:43) >    EXAM:  PORTABLE CHEST URGENT                          PROCEDURE DATE:  2017    INTERPRETATION:  AP chest on  at 12:07 PM. Patient has respiratory distress.  Tracheostomy again noted. Patient's hand obscures left base.  Heart size is within normal limits.  Small atelectatic infiltrate at right base is similar to  as is the chest.  IMPRESSION: Persistent small right base infiltrate.    Xray Abdomen 1 View PORTABLE -Urgent (17 @ 12:43) >  EXAM:  PORTABLE ABDOMEN                          PROCEDURE DATE:  2017    INTERPRETATION:  Portable supine abdominal view. 2 films obtained. Patient has respiratory distress. Gastrostomy is noted. Stomach is distended at this time and this has increased from 2014.  Remaining abdominal gas pattern is grossly normal.  There is roughly symmetric degeneration of the outer corners of both hips. No gross organomegaly is seen. Mild left lumbar curve noted.  IMPRESSION: Stomach distention.    CODE STATUS: Full Code  GOC discussion: No

## 2017-07-21 NOTE — CONSULT NOTE ADULT - ATTENDING COMMENTS
70 yo F with frontal lobe dementia, CVA, vent dependent chronic respiratory failure, dysphagia with PEG admitted with respiratory distress in setting of abdominal distension and vomiting.  Possible GIB and/or severe sepsis with lactic acidosis, mild RYAN.      --mental status at baseline  --hypotension, likely from volume depletion, improving with IVF bolus, continue prn  --chronic respiratory failure, wean down FiO2  pt bucking vent, most comfortable on PS mode of ventilation, continue for right now  repeat ABG  --coffee ground emesis, possible GIB, PPI bid, check FOBT  GI eval  unclear why not tolerating TF, CT a/p to eval for obstruction, ?functional obstruction, no fecal impaction on exam  serial CBCs 70 yo F with frontal lobe dementia, CVA, vent dependent chronic respiratory failure, dysphagia with PEG admitted with respiratory distress in setting of abdominal distension and vomiting.  Possible GIB and/or severe sepsis with lactic acidosis, mild RYAN.      --mental status at baseline, continue home fentanyl patch  --hypotension, likely from volume depletion, improving with IVF bolus, continue prn  --chronic respiratory failure, wean down FiO2  pt bucking vent, most comfortable on PS mode of ventilation, continue for right now  repeat ABG  --coffee ground emesis, possible GIB, PPI bid, check FOBT, serial CBCs, GI eval  unclear why not tolerating TF, CT a/p to eval for obstruction, ?functional obstruction, no fecal impaction on exam, PEG to drainage for now  --RYAN, likely prerenal volume depletion, check urine lytes  --possible severe sepsis from UTI v pneumonia, empiric vanc, zosyn, f/u Cx  check CT chest   --hypoglycemia, cont D5  --vaughn for urinary retention as per   --discussed plan with  at length  --pt critically ill. CC time 90min

## 2017-07-21 NOTE — PROVIDER CONTACT NOTE (OTHER) - SITUATION
received from the ER, tracheostomy to ventilator support.  Woody to gravity drainage.  GT to gravity drainage, abdomen distended soft when palpated.  Patient is non-interactive (baseline) per SO

## 2017-07-21 NOTE — CONSULT NOTE ADULT - SUBJECTIVE AND OBJECTIVE BOX
Chief Complaint:  Patient is a 71y old  Female who presents with a chief complaint of resp distress (2017 13:51)      HPI: 70 yo female admitted with pneumonia, history of trach vent dependent, peg tube due to early onset dementia. Minimal mental status, non communicative at baseline noted to have vomiting after PEG tube feeds x 3 weeks. As per family at bedside, patient also noted to have chronic constipation. Family denies blood in stools.     Allergies:  codeine (Hives)      Medications:  piperacillin/tazobactam IVPB. 3.375 Gram(s) IV Intermittent every 8 hours  lactobacillus acidophilus 1 Tablet(s) Oral every 8 hours  iohexol 300 mG (iodine)/mL Oral Solution 500 milliLiter(s) Oral every 1 hour  dextrose 5%. 1000 milliLiter(s) IV Continuous <Continuous>  dextrose 50% Injectable 12.5 Gram(s) IV Push once  dextrose 50% Injectable 25 Gram(s) IV Push once  dextrose 50% Injectable 25 Gram(s) IV Push once  artificial  tears Solution 1 Drop(s) Both EYES four times a day  ALBUTerol    90 MICROgram(s) HFA Inhaler 2 Puff(s) Inhalation every 6 hours  fentaNYL   Patch  12 MICROgram(s)/Hr 1 Patch Transdermal every 72 hours  polyethylene glycol 3350 17 Gram(s) Oral daily  sodium chloride 0.65% Nasal 1 Spray(s) Both Nostrils three times a day PRN  dextrose 5%. 1000 milliLiter(s) IV Continuous <Continuous>  pantoprazole  Injectable 40 milliGRAM(s) IV Push every 12 hours      PMHX/PSHX:  Hypertension  Respiratory failure  Diabetes mellitus  Aphasic stroke  Dementia of frontal lobe type  Tracheostomy in place  Gastrostomy in place  Hx of appendectomy      Family history:  nc          PHYSICAL EXAM:   Vital Signs:  Vital Signs Last 24 Hrs  T(C): 37 (2017 15:05), Max: 38 (2017 11:15)  T(F): 98.6 (2017 15:05), Max: 100.4 (2017 11:15)  HR: 64 (2017 17:00) (63 - 162)  BP: 90/52 (2017 17:00) (84/45 - 172/93)  BP(mean): 66 (2017 17:00) (60 - 82)  RR: 17 (2017 17:00) (17 - 38)  SpO2: 100% (2017 17:00) (87% - 100%)  Daily     Daily Weight in k (2017 14:45)    GENERAL:  non communicative  HEENT:  NC/AT,  conjunctivae clear and pink, no thyromegaly, nodules, adenopathy, no JVD, sclera -anicteric, + trach to vent  CHEST:  Full & symmetric excursion, no increased effort, breath sounds clear  HEART:  Regular rhythm, S1, S2, no murmur/rub/S3/S4, no abdominal bruit, no edema  ABDOMEN:  Soft, non-tender, + distention,  normoactive bowel sounds,  no masses ,no hepato-splenomegaly, no signs of chronic liver disease, + peg tube  EXTEREMITIES:  no cyanosis,clubbing or edema  SKIN:  No rash/erythema/ecchymoses/petechiae/wounds/abscess/warm/dry  NEURO:  Alert, oriented, no asterixis, no tremor, no encephalopathy    LABS:                        14.7   18.4  )-----------( 330      ( 2017 11:45 )             45.1     07-21    142  |  111<H>  |  19  ----------------------------<  78  4.0   |  16<L>  |  1.30    Ca    8.6      2017 11:45    TPro  8.5<H>  /  Alb  3.6  /  TBili  0.9  /  DBili  x   /  AST  42<H>  /  ALT  38  /  AlkPhos  99  07-21    LIVER FUNCTIONS - ( 2017 11:45 )  Alb: 3.6 g/dL / Pro: 8.5 g/dL / ALK PHOS: 99 U/L / ALT: 38 U/L / AST: 42 U/L / GGT: x           PT/INR - ( 2017 11:45 )   PT: 12.9 sec;   INR: 1.18 ratio           Urinalysis Basic - ( 2017 11:34 )    Color: Yellow / Appearance: Turbid / S.015 / pH: x  Gluc: x / Ketone: Negative  / Bili: Negative / Urobili: Negative   Blood: x / Protein: 75 mg/dL / Nitrite: Negative   Leuk Esterase: Moderate / RBC: 0-2 /HPF / WBC 0-2   Sq Epi: x / Non Sq Epi: x / Bacteria: Many          Imaging:

## 2017-07-22 DIAGNOSIS — R50.9 FEVER, UNSPECIFIED: ICD-10-CM

## 2017-07-22 DIAGNOSIS — D50.0 IRON DEFICIENCY ANEMIA SECONDARY TO BLOOD LOSS (CHRONIC): ICD-10-CM

## 2017-07-22 DIAGNOSIS — A41.9 SEPSIS, UNSPECIFIED ORGANISM: ICD-10-CM

## 2017-07-22 DIAGNOSIS — D72.829 ELEVATED WHITE BLOOD CELL COUNT, UNSPECIFIED: ICD-10-CM

## 2017-07-22 DIAGNOSIS — K92.2 GASTROINTESTINAL HEMORRHAGE, UNSPECIFIED: ICD-10-CM

## 2017-07-22 DIAGNOSIS — J69.0 PNEUMONITIS DUE TO INHALATION OF FOOD AND VOMIT: ICD-10-CM

## 2017-07-22 DIAGNOSIS — K21.9 GASTRO-ESOPHAGEAL REFLUX DISEASE WITHOUT ESOPHAGITIS: ICD-10-CM

## 2017-07-22 LAB
-  CANDIDA ALBICANS: SIGNIFICANT CHANGE UP
-  CANDIDA GLABRATA: SIGNIFICANT CHANGE UP
-  CANDIDA KRUSEI: SIGNIFICANT CHANGE UP
-  CANDIDA PARAPSILOSIS: SIGNIFICANT CHANGE UP
-  CANDIDA TROPICALIS: SIGNIFICANT CHANGE UP
-  COAGULASE NEGATIVE STAPHYLOCOCCUS: SIGNIFICANT CHANGE UP
-  K. PNEUMONIAE GROUP: SIGNIFICANT CHANGE UP
-  KPC RESISTANCE GENE: SIGNIFICANT CHANGE UP
-  STREPTOCOCCUS SP. (NOT GRP A, B OR S PNEUMONIAE): SIGNIFICANT CHANGE UP
A BAUMANNII DNA SPEC QL NAA+PROBE: SIGNIFICANT CHANGE UP
ALBUMIN SERPL ELPH-MCNC: 2.4 G/DL — LOW (ref 3.3–5)
ALP SERPL-CCNC: 59 U/L — SIGNIFICANT CHANGE UP (ref 40–120)
ALT FLD-CCNC: 28 U/L — SIGNIFICANT CHANGE UP (ref 12–78)
ANION GAP SERPL CALC-SCNC: 9 MMOL/L — SIGNIFICANT CHANGE UP (ref 5–17)
AST SERPL-CCNC: 32 U/L — SIGNIFICANT CHANGE UP (ref 15–37)
BASOPHILS # BLD AUTO: 0 K/UL — SIGNIFICANT CHANGE UP (ref 0–0.2)
BASOPHILS NFR BLD AUTO: 0.8 % — SIGNIFICANT CHANGE UP (ref 0–2)
BILIRUB SERPL-MCNC: 1 MG/DL — SIGNIFICANT CHANGE UP (ref 0.2–1.2)
BUN SERPL-MCNC: 11 MG/DL — SIGNIFICANT CHANGE UP (ref 7–23)
CALCIUM SERPL-MCNC: 7.1 MG/DL — LOW (ref 8.5–10.1)
CHLORIDE SERPL-SCNC: 112 MMOL/L — HIGH (ref 96–108)
CK SERPL-CCNC: 504 U/L — HIGH (ref 26–192)
CO2 SERPL-SCNC: 21 MMOL/L — LOW (ref 22–31)
CORTIS AM PEAK SERPL-MCNC: 7.4 UG/DL — SIGNIFICANT CHANGE UP (ref 6–18.4)
CREAT ?TM UR-MCNC: 35 MG/DL — SIGNIFICANT CHANGE UP
CREAT SERPL-MCNC: 0.97 MG/DL — SIGNIFICANT CHANGE UP (ref 0.5–1.3)
CULTURE RESULTS: SIGNIFICANT CHANGE UP
E CLOAC COMP DNA BLD POS QL NAA+PROBE: SIGNIFICANT CHANGE UP
E COLI DNA BLD POS QL NAA+NON-PROBE: SIGNIFICANT CHANGE UP
ENTEROCOC DNA BLD POS QL NAA+NON-PROBE: SIGNIFICANT CHANGE UP
ENTEROCOC DNA BLD POS QL NAA+NON-PROBE: SIGNIFICANT CHANGE UP
EOSINOPHIL # BLD AUTO: 0.1 K/UL — SIGNIFICANT CHANGE UP (ref 0–0.5)
EOSINOPHIL NFR BLD AUTO: 1.5 % — SIGNIFICANT CHANGE UP (ref 0–6)
GLUCOSE SERPL-MCNC: 143 MG/DL — HIGH (ref 70–99)
GP B STREP DNA BLD POS QL NAA+NON-PROBE: SIGNIFICANT CHANGE UP
GRAM STN FLD: SIGNIFICANT CHANGE UP
HAEM INFLU DNA BLD POS QL NAA+NON-PROBE: SIGNIFICANT CHANGE UP
HCT VFR BLD CALC: 29.8 % — LOW (ref 34.5–45)
HGB BLD-MCNC: 9.6 G/DL — LOW (ref 11.5–15.5)
K OXYTOCA DNA BLD POS QL NAA+NON-PROBE: SIGNIFICANT CHANGE UP
L MONOCYTOG DNA BLD POS QL NAA+NON-PROBE: SIGNIFICANT CHANGE UP
LYMPHOCYTES # BLD AUTO: 1.6 K/UL — SIGNIFICANT CHANGE UP (ref 1–3.3)
LYMPHOCYTES # BLD AUTO: 27.6 % — SIGNIFICANT CHANGE UP (ref 13–44)
MAGNESIUM SERPL-MCNC: 2 MG/DL — SIGNIFICANT CHANGE UP (ref 1.6–2.6)
MCHC RBC-ENTMCNC: 27.9 PG — SIGNIFICANT CHANGE UP (ref 27–34)
MCHC RBC-ENTMCNC: 32.1 GM/DL — SIGNIFICANT CHANGE UP (ref 32–36)
MCV RBC AUTO: 86.8 FL — SIGNIFICANT CHANGE UP (ref 80–100)
METHOD TYPE: SIGNIFICANT CHANGE UP
MONOCYTES # BLD AUTO: 0.6 K/UL — SIGNIFICANT CHANGE UP (ref 0–0.9)
MONOCYTES NFR BLD AUTO: 10.4 % — HIGH (ref 1–9)
MRSA SPEC QL CULT: SIGNIFICANT CHANGE UP
MSSA DNA SPEC QL NAA+PROBE: SIGNIFICANT CHANGE UP
N MEN ISLT CULT: SIGNIFICANT CHANGE UP
NEUTROPHILS # BLD AUTO: 3.6 K/UL — SIGNIFICANT CHANGE UP (ref 1.8–7.4)
NEUTROPHILS NFR BLD AUTO: 59.8 % — SIGNIFICANT CHANGE UP (ref 43–77)
P AERUGINOSA DNA BLD POS NAA+NON-PROBE: SIGNIFICANT CHANGE UP
PHOSPHATE SERPL-MCNC: 2.1 MG/DL — LOW (ref 2.5–4.5)
PLATELET # BLD AUTO: 163 K/UL — SIGNIFICANT CHANGE UP (ref 150–400)
POTASSIUM SERPL-MCNC: 3 MMOL/L — LOW (ref 3.5–5.3)
POTASSIUM SERPL-SCNC: 3 MMOL/L — LOW (ref 3.5–5.3)
PROT SERPL-MCNC: 5.6 G/DL — LOW (ref 6–8.3)
PROTEUS SP DNA BLD POS QL NAA+NON-PROBE: SIGNIFICANT CHANGE UP
RBC # BLD: 3.44 M/UL — LOW (ref 3.8–5.2)
RBC # FLD: 13.9 % — SIGNIFICANT CHANGE UP (ref 10.3–14.5)
S MARCESCENS DNA BLD POS NAA+NON-PROBE: SIGNIFICANT CHANGE UP
S PNEUM DNA BLD POS QL NAA+NON-PROBE: SIGNIFICANT CHANGE UP
S PYO DNA BLD POS QL NAA+NON-PROBE: SIGNIFICANT CHANGE UP
SODIUM SERPL-SCNC: 142 MMOL/L — SIGNIFICANT CHANGE UP (ref 135–145)
SODIUM UR-SCNC: 50 MMOL/L — SIGNIFICANT CHANGE UP
SPECIMEN SOURCE: SIGNIFICANT CHANGE UP
T3 SERPL-MCNC: 59 NG/DL — LOW (ref 80–200)
T4 AB SER-ACNC: 6 UG/DL — SIGNIFICANT CHANGE UP (ref 4.6–12)
TSH SERPL-MCNC: 1.86 UIU/ML — SIGNIFICANT CHANGE UP (ref 0.36–3.74)
WBC # BLD: 6 K/UL — SIGNIFICANT CHANGE UP (ref 3.8–10.5)
WBC # FLD AUTO: 6 K/UL — SIGNIFICANT CHANGE UP (ref 3.8–10.5)

## 2017-07-22 PROCEDURE — 99291 CRITICAL CARE FIRST HOUR: CPT

## 2017-07-22 RX ORDER — POTASSIUM CHLORIDE 20 MEQ
40 PACKET (EA) ORAL ONCE
Qty: 0 | Refills: 0 | Status: COMPLETED | OUTPATIENT
Start: 2017-07-22 | End: 2017-07-22

## 2017-07-22 RX ORDER — DEXTROSE 10 % IN WATER 10 %
500 INTRAVENOUS SOLUTION INTRAVENOUS
Qty: 0 | Refills: 0 | Status: DISCONTINUED | OUTPATIENT
Start: 2017-07-22 | End: 2017-07-23

## 2017-07-22 RX ORDER — SIMETHICONE 80 MG/1
80 TABLET, CHEWABLE ORAL EVERY 6 HOURS
Qty: 0 | Refills: 0 | Status: DISCONTINUED | OUTPATIENT
Start: 2017-07-22 | End: 2017-07-26

## 2017-07-22 RX ORDER — SENNA PLUS 8.6 MG/1
2 TABLET ORAL AT BEDTIME
Qty: 0 | Refills: 0 | Status: DISCONTINUED | OUTPATIENT
Start: 2017-07-22 | End: 2017-07-26

## 2017-07-22 RX ORDER — POTASSIUM CHLORIDE 20 MEQ
10 PACKET (EA) ORAL
Qty: 0 | Refills: 0 | Status: COMPLETED | OUTPATIENT
Start: 2017-07-22 | End: 2017-07-22

## 2017-07-22 RX ORDER — METHYLNALTREXONE BROMIDE 12 MG/.6ML
12 INJECTION, SOLUTION SUBCUTANEOUS EVERY OTHER DAY
Qty: 0 | Refills: 0 | Status: DISCONTINUED | OUTPATIENT
Start: 2017-07-22 | End: 2017-07-22

## 2017-07-22 RX ORDER — ALBUTEROL 90 UG/1
4 AEROSOL, METERED ORAL EVERY 6 HOURS
Qty: 0 | Refills: 0 | Status: DISCONTINUED | OUTPATIENT
Start: 2017-07-22 | End: 2017-07-26

## 2017-07-22 RX ADMIN — Medication 1 DROP(S): at 18:23

## 2017-07-22 RX ADMIN — SIMETHICONE 80 MILLIGRAM(S): 80 TABLET, CHEWABLE ORAL at 18:05

## 2017-07-22 RX ADMIN — Medication 40 MILLIEQUIVALENT(S): at 07:52

## 2017-07-22 RX ADMIN — Medication 100 MILLIEQUIVALENT(S): at 07:52

## 2017-07-22 RX ADMIN — ALBUTEROL 2 PUFF(S): 90 AEROSOL, METERED ORAL at 01:47

## 2017-07-22 RX ADMIN — Medication 1 DROP(S): at 05:12

## 2017-07-22 RX ADMIN — Medication 100 MILLIEQUIVALENT(S): at 10:41

## 2017-07-22 RX ADMIN — PANTOPRAZOLE SODIUM 40 MILLIGRAM(S): 20 TABLET, DELAYED RELEASE ORAL at 18:08

## 2017-07-22 RX ADMIN — Medication 1 TABLET(S): at 22:04

## 2017-07-22 RX ADMIN — PIPERACILLIN AND TAZOBACTAM 25 GRAM(S): 4; .5 INJECTION, POWDER, LYOPHILIZED, FOR SOLUTION INTRAVENOUS at 14:11

## 2017-07-22 RX ADMIN — POLYETHYLENE GLYCOL 3350 17 GRAM(S): 17 POWDER, FOR SOLUTION ORAL at 05:13

## 2017-07-22 RX ADMIN — Medication 1 DROP(S): at 12:17

## 2017-07-22 RX ADMIN — PIPERACILLIN AND TAZOBACTAM 25 GRAM(S): 4; .5 INJECTION, POWDER, LYOPHILIZED, FOR SOLUTION INTRAVENOUS at 04:07

## 2017-07-22 RX ADMIN — PIPERACILLIN AND TAZOBACTAM 25 GRAM(S): 4; .5 INJECTION, POWDER, LYOPHILIZED, FOR SOLUTION INTRAVENOUS at 22:04

## 2017-07-22 RX ADMIN — SIMETHICONE 80 MILLIGRAM(S): 80 TABLET, CHEWABLE ORAL at 12:17

## 2017-07-22 RX ADMIN — Medication 1 DROP(S): at 00:25

## 2017-07-22 RX ADMIN — Medication 100 MILLIEQUIVALENT(S): at 09:02

## 2017-07-22 RX ADMIN — Medication 75 MILLILITER(S): at 04:08

## 2017-07-22 RX ADMIN — Medication 1 TABLET(S): at 05:13

## 2017-07-22 RX ADMIN — PANTOPRAZOLE SODIUM 40 MILLIGRAM(S): 20 TABLET, DELAYED RELEASE ORAL at 05:13

## 2017-07-22 RX ADMIN — Medication 1 TABLET(S): at 14:11

## 2017-07-22 RX ADMIN — Medication 75 MILLILITER(S): at 09:52

## 2017-07-22 RX ADMIN — Medication 10 MILLIGRAM(S): at 12:17

## 2017-07-22 RX ADMIN — SENNA PLUS 2 TABLET(S): 8.6 TABLET ORAL at 22:04

## 2017-07-22 NOTE — DIETITIAN INITIAL EVALUATION ADULT. - OTHER INFO
Pt has a history of dementia, tracheostomy, and gastrostomy, s/p aphasic stroke who is vent dependent and a resident at a nursing home. Per  over the past two weeks the patient has had progressive difficulty with PEG tube feeding. The patient has vomited several times over the last two weeks and had drainage of dark fluid from the PEG tube described as coffee grounds. The stomach was decompressed earlier this week at the nursing home with a vaughn catheter via the PEG tube. The last episode of vomiting was last night.  Pt apparently becomes SOB and tachypneic when constipated and straining. Pt has a history of dementia, tracheostomy, and gastrostomy, s/p aphasic stroke who is vent dependent and a resident at a nursing home (TF at NH: glucerna 1.5)  Per  over the past two weeks the patient has had progressive difficulty with PEG tube feeding. The patient has vomited several times over the last two weeks and had drainage of dark fluid from the PEG tube described as coffee grounds. The stomach was decompressed earlier this week at the nursing home with a vaughn catheter via the PEG tube. The last episode of vomiting was last night.  Pt apparently becomes SOB and tachypneic when constipated and straining.  MD wants TF @ low rate 2/2 hx of intolerance. Allergies: orange, strawberry, lactose, walnut, red apples.  CT: LLL Pna, no bowel obstruction.  Had loose BM yesterday.

## 2017-07-22 NOTE — PROGRESS NOTE ADULT - SUBJECTIVE AND OBJECTIVE BOX
24 hour events:   CT c/a/p showed LLL pneumonia, no bowel obstruction  received enema and dulcolax suppository and had loose BM, +FOBT  persistent hypoglycemia requiring increase to D10  remained on PS 5/5 overnight    Review of Systems: unable to obtain due to dementia    T(F): 99 (07-22-17 @ 07:28), Max: 100.4 (07-21-17 @ 11:15)  HR: 61 (07-22-17 @ 07:00) (58 - 162)  BP: 105/55 (07-22-17 @ 07:00) (84/45 - 172/93)  RR: 17 (07-22-17 @ 07:00) (12 - 38)  SpO2: 99% (07-22-17 @ 07:00) (87% - 100%)    Mode: CPAP with PS, FiO2: 30, PEEP: 5, PS: 5    CAPILLARY BLOOD GLUCOSE  151 (22 Jul 2017 05:00)    I&O's Summary    21 Jul 2017 07:01  -  22 Jul 2017 07:00  --------------------------------------------------------  IN: 2805 mL / OUT: 1095 mL / NET: 1710 mL        Physical Exam:   Gen:  Neuro:  HEENT:  Resp:  CVS:  Abd:  Ext:  Skin:    Meds:  piperacillin/tazobactam IVPB. IV Intermittent  dextrose 50% Injectable IV Push  dextrose 50% Injectable IV Push  dextrose 50% Injectable IV Push  ALBUTerol    90 MICROgram(s) HFA Inhaler Inhalation PRN  fentaNYL   Patch  12 MICROgram(s)/Hr Transdermal  polyethylene glycol 3350 Oral  pantoprazole  Injectable IV Push  bisacodyl Suppository Rectal  dextrose 5%. IV Continuous  dextrose 10%. IV Continuous  potassium chloride  10 mEq/100 mL IVPB IV Intermittent  potassium chloride    Tablet ER Oral  artificial  tears Solution Both EYES  sodium chloride 0.65% Nasal Both Nostrils PRN  lactobacillus acidophilus Oral                        9.6    6.0   )-----------( 163      ( 22 Jul 2017 05:51 )             29.8       07-22    142  |  112<H>  |  11  ----------------------------<  143<H>  3.0<L>   |  21<L>  |  0.97    Ca    7.1<L>      22 Jul 2017 05:51  Phos  2.1     07-22  Mg     2.0     07-22    TPro  5.6<L>  /  Alb  2.4<L>  /  TBili  1.0  /  DBili  x   /  AST  32  /  ALT  28  /  AlkPhos  59  07-22    Lactate 1.9           07-21 @ 21:11  Lactate 2.4           07-21 @ 18:03  Lactate 3.2           07-21 @ 13:42  Lactate 6.9           07-21 @ 11:45      CARDIAC MARKERS ( 22 Jul 2017 05:51 )  x     / x     / 504 U/L / x     / x      CARDIAC MARKERS ( 21 Jul 2017 21:12 )  .034 ng/mL / x     / 514 U/L / x     / 13.0 ng/mL  CARDIAC MARKERS ( 21 Jul 2017 11:45 )  <.015 ng/mL / x     / 333 U/L / x     / x        Thyroid Stimulating Hormone, Serum: 1.86 uIU/mL    +FOBT    Sputum . (07.21.17 @ 21:39)  Gram Stain: polys, no squames, few gnr, rare gpc pairs    Radiology:   CT Chest No Cont (07.21.17 @ 18:22) >  Impression:  Limited study.  Patchy bibasilar peribronchial airspace opacities as discussed.    Gastric wall thickening, correlate for gastritis.  No bowel obstruction noted.  Other findings as discussed above.    Bedside ultrasound: ***    CENTRAL LINE: N/Y          DATE INSERTED:              REMOVE: Y/N  REID: N/Y                       DATE INSERTED:              REMOVE: Y/N  A-LINE: N/Y                       DATE INSERTED:              REMOVE: Y/N    GLOBAL ISSUE/BEST PRACTICE:  Analgesia:  Sedation:  CAM-ICU:   HOB elevation: yes  Stress ulcer prophylaxis:  VTE prophylaxis:  Glycemic control:  Nutrition:    CODE STATUS: *** 24 hour events:   CT c/a/p showed LLL pneumonia, no bowel obstruction  received enema and dulcolax suppository and had loose BM, +FOBT  persistent hypoglycemia requiring increase to D10  remained on PS 5/5 overnight without difficulty    Review of Systems: unable to obtain due to dementia    T(F): 99 (07-22-17 @ 07:28), Max: 100.4 (07-21-17 @ 11:15)  HR: 61 (07-22-17 @ 07:00) (58 - 162)  BP: 105/55 (07-22-17 @ 07:00) (84/45 - 172/93)  RR: 17 (07-22-17 @ 07:00) (12 - 38)  SpO2: 99% (07-22-17 @ 07:00) (87% - 100%)    Mode: CPAP with PS, FiO2: 30, PEEP: 5, PS: 5    CAPILLARY BLOOD GLUCOSE  151 (22 Jul 2017 05:00)    I&O's Summary    21 Jul 2017 07:01  -  22 Jul 2017 07:00  --------------------------------------------------------  IN: 2805 mL / OUT: 1095 mL / NET: 1710 mL    Physical Exam:   Gen: NAD, laying in bed  Neuro: awake, not alert  HEENT: PERRL  Resp: CTABL  CVS: RRR  Abd: soft, mild distension  Ext: no edema, contracted UE and LE    Meds:  piperacillin/tazobactam IVPB. IV Intermittent  dextrose 50% Injectable IV Push  dextrose 50% Injectable IV Push  dextrose 50% Injectable IV Push  ALBUTerol    90 MICROgram(s) HFA Inhaler Inhalation PRN  fentaNYL   Patch  12 MICROgram(s)/Hr Transdermal  polyethylene glycol 3350 Oral  pantoprazole  Injectable IV Push  bisacodyl Suppository Rectal  dextrose 5%. IV Continuous  dextrose 10%. IV Continuous  potassium chloride  10 mEq/100 mL IVPB IV Intermittent  potassium chloride    Tablet ER Oral  artificial  tears Solution Both EYES  sodium chloride 0.65% Nasal Both Nostrils PRN  lactobacillus acidophilus Oral                        9.6    6.0   )-----------( 163      ( 22 Jul 2017 05:51 )             29.8       07-22    142  |  112<H>  |  11  ----------------------------<  143<H>  3.0<L>   |  21<L>  |  0.97    Ca    7.1<L>      22 Jul 2017 05:51  Phos  2.1     07-22  Mg     2.0     07-22    TPro  5.6<L>  /  Alb  2.4<L>  /  TBili  1.0  /  DBili  x   /  AST  32  /  ALT  28  /  AlkPhos  59  07-22    Lactate 1.9           07-21 @ 21:11  Lactate 2.4           07-21 @ 18:03  Lactate 3.2           07-21 @ 13:42  Lactate 6.9           07-21 @ 11:45      CARDIAC MARKERS ( 22 Jul 2017 05:51 )  x     / x     / 504 U/L / x     / x      CARDIAC MARKERS ( 21 Jul 2017 21:12 )  .034 ng/mL / x     / 514 U/L / x     / 13.0 ng/mL  CARDIAC MARKERS ( 21 Jul 2017 11:45 )  <.015 ng/mL / x     / 333 U/L / x     / x        Thyroid Stimulating Hormone, Serum: 1.86 uIU/mL    +FOBT    Sputum . (07.21.17 @ 21:39)  Gram Stain: polys, no squames, few gnr, rare gpc pairs    Radiology:   CT Chest No Cont (07.21.17 @ 18:22) >  Impression:  Limited study.  Patchy bibasilar peribronchial airspace opacities as discussed.    Gastric wall thickening, correlate for gastritis.  No bowel obstruction noted.  Other findings as discussed above.    CENTRAL LINE: N  REID: Y                       REMOVE: N, chronic  A-LINE: N    GLOBAL ISSUE/BEST PRACTICE:  Analgesia: Y  Sedation:N  CAM-ICU: YULIYA  HOB elevation: yes  Stress ulcer prophylaxis:Y  VTE prophylaxis: Y  Glycemic control: Y  Nutrition: NPO    CODE STATUS: FULL

## 2017-07-22 NOTE — PROGRESS NOTE ADULT - SUBJECTIVE AND OBJECTIVE BOX
Interval history: no acute events      HPI: 72 yo female admitted with pneumonia, history of trach vent dependent, peg tube due to early onset dementia. Minimal mental status, non communicative at baseline noted to have vomiting after PEG tube feeds x 3 weeks. As per family at bedside, patient also noted to have chronic constipation. Family denies blood in stools. + occult gi bleed    Allergies:  codeine (Hives)      Medications:  piperacillin/tazobactam IVPB. 3.375 Gram(s) IV Intermittent every 8 hours  lactobacillus acidophilus 1 Tablet(s) Oral every 8 hours  iohexol 300 mG (iodine)/mL Oral Solution 500 milliLiter(s) Oral every 1 hour  dextrose 5%. 1000 milliLiter(s) IV Continuous <Continuous>  dextrose 50% Injectable 12.5 Gram(s) IV Push once  dextrose 50% Injectable 25 Gram(s) IV Push once  dextrose 50% Injectable 25 Gram(s) IV Push once  artificial  tears Solution 1 Drop(s) Both EYES four times a day  ALBUTerol    90 MICROgram(s) HFA Inhaler 2 Puff(s) Inhalation every 6 hours  fentaNYL   Patch  12 MICROgram(s)/Hr 1 Patch Transdermal every 72 hours  polyethylene glycol 3350 17 Gram(s) Oral daily  sodium chloride 0.65% Nasal 1 Spray(s) Both Nostrils three times a day PRN  dextrose 5%. 1000 milliLiter(s) IV Continuous <Continuous>  pantoprazole  Injectable 40 milliGRAM(s) IV Push every 12 hours      PMHX/PSHX:  Hypertension  Respiratory failure  Diabetes mellitus  Aphasic stroke  Dementia of frontal lobe type  Tracheostomy in place  Gastrostomy in place  Hx of appendectomy      Family history:  nc          PHYSICAL EXAM:   Vital Signs:  Vital Signs Last 24 Hrs  T(C): 37 (2017 15:05), Max: 38 (2017 11:15)  T(F): 98.6 (2017 15:05), Max: 100.4 (2017 11:15)  HR: 64 (2017 17:00) (63 - 162)  BP: 90/52 (2017 17:00) (84/45 - 172/93)  BP(mean): 66 (2017 17:00) (60 - 82)  RR: 17 (2017 17:00) (17 - 38)  SpO2: 100% (2017 17:00) (87% - 100%)  Daily     Daily Weight in k (2017 14:45)    GENERAL:  non communicative  HEENT:  NC/AT,  conjunctivae clear and pink, no thyromegaly, nodules, adenopathy, no JVD, sclera -anicteric, + trach to vent  CHEST:  Full & symmetric excursion, no increased effort, breath sounds clear  HEART:  Regular rhythm, S1, S2, no murmur/rub/S3/S4, no abdominal bruit, no edema  ABDOMEN:  Soft, non-tender, + distention,  normoactive bowel sounds,  no masses ,no hepato-splenomegaly, no signs of chronic liver disease, + peg tube  EXTEREMITIES:  no cyanosis,clubbing or edema  SKIN:  No rash/erythema/ecchymoses/petechiae/wounds/abscess/warm/dry  NEURO:  Alert, oriented, no asterixis, no tremor, no encephalopathy    LABS:                        14.7   18.4  )-----------( 330      ( 2017 11:45 )             45.1     07-21    142  |  111<H>  |  19  ----------------------------<  78  4.0   |  16<L>  |  1.30    Ca    8.6      2017 11:45    TPro  8.5<H>  /  Alb  3.6  /  TBili  0.9  /  DBili  x   /  AST  42<H>  /  ALT  38  /  AlkPhos  99  -21    LIVER FUNCTIONS - ( 2017 11:45 )  Alb: 3.6 g/dL / Pro: 8.5 g/dL / ALK PHOS: 99 U/L / ALT: 38 U/L / AST: 42 U/L / GGT: x           PT/INR - ( 2017 11:45 )   PT: 12.9 sec;   INR: 1.18 ratio           Urinalysis Basic - ( 2017 11:34 )    Color: Yellow / Appearance: Turbid / S.015 / pH: x  Gluc: x / Ketone: Negative  / Bili: Negative / Urobili: Negative   Blood: x / Protein: 75 mg/dL / Nitrite: Negative   Leuk Esterase: Moderate / RBC: 0-2 /HPF / WBC 0-2   Sq Epi: x / Non Sq Epi: x / Bacteria: Many          Imaging:

## 2017-07-22 NOTE — CONSULT NOTE ADULT - SUBJECTIVE AND OBJECTIVE BOX
PULMONARY/CRITICAL CARE        Patient is a 71y old  Female who presents with a chief complaint of resp distress   Pt has a history of dementia, tracheostomy, and gastrostomy, s/p aphasic stroke who is vent dependent and a resident at a nursing home. Per  over the past two weeks the patient has had progressive difficulty with PEG tube feeding. The patient has vomited several times over the last two weeks and had drainage of dark fluid from the PEG tube described as coffee grounds. The stomach was decompressed earlier this week at the nursing home with a vaughn catheter via the PEG tube. The last episode of vomiting was last night. The patient began to have progressive difficulty of breath earlier today, which led the use of AMBU bag for ventilation and subsequent transfer to the ICU.Pt apparently becomes SOB and tachypneic when constipated and straining.    BRIEF HOSPITAL COURSE: ***    Events last 24 hours: ***    PAST MEDICAL & SURGICAL HISTORY:  Respiratory failure  Constipation  GERD (gastroesophageal reflux disease)  Hypertension  Respiratory failure  Diabetes mellitus  Aphasic stroke  Dementia of frontal lobe type  Tracheostomy in place  Gastrostomy in place  Hx of appendectomy    Allergies    codeine (Hives)    Intolerances      FAMILY HISTORY: LIVES [facility].  No pertinent family history in first degree relatives      Review of Systems:  CONSTITUTIONAL: No fever, chills, or fatigue  EYES: No eye pain, visual disturbances, or discharge  ENMT:  No difficulty hearing, tinnitus, vertigo; No sinus or throat pain  NECK: No pain or stiffness  RESPIRATORY:Trach No cough, wheezing, chills or hemoptysis; Intermittent shortness of breath  CARDIOVASCULAR: No chest pain, palpitations, dizziness, or leg swelling  GASTROINTESTINAL:Peg  No abdominal or epigastric pain. No nausea, vomiting, or hematemesis; No diarrhea or constipation.Has heme positive stool. Had BM with enema  GENITOURINARY: No dysuria, frequency, hematuria, or incontinence  NEUROLOGICAL:Non verbal, opens eyes  SKIN: No itching, burning, rashes, or lesions   MUSCULOSKELETAL: No joint pain or swelling; No muscle, back, or extremity pain  PSYCHIATRIC:   Medications:  piperacillin/tazobactam IVPB. 3.375 Gram(s) IV Intermittent every 8 hours      ALBUTerol    90 MICROgram(s) HFA Inhaler 4 Puff(s) Inhalation every 6 hours PRN    fentaNYL   Patch  12 MICROgram(s)/Hr 1 Patch Transdermal every 72 hours        polyethylene glycol 3350 17 Gram(s) Oral daily  pantoprazole  Injectable 40 milliGRAM(s) IV Push every 12 hours  bisacodyl Suppository 10 milliGRAM(s) Rectal daily      dextrose 50% Injectable 12.5 Gram(s) IV Push once  dextrose 50% Injectable 25 Gram(s) IV Push once  dextrose 50% Injectable 25 Gram(s) IV Push once    dextrose 5%. 1000 milliLiter(s) IV Continuous <Continuous>  dextrose 10%. 500 milliLiter(s) IV Continuous <Continuous>  potassium chloride  10 mEq/100 mL IVPB 10 milliEquivalent(s) IV Intermittent every 1 hour      artificial  tears Solution 1 Drop(s) Both EYES four times a day  sodium chloride 0.65% Nasal 1 Spray(s) Both Nostrils three times a day PRN    lactobacillus acidophilus 1 Tablet(s) Oral every 8 hours      Mode: AC/ CMV (Assist Control/ Continuous Mandatory Ventilation)  RR (machine): 20  TV (machine): 450  FiO2: 30  PEEP: 5  ITime: 1  MAP: 14  PIP: 27      ICU Vital Signs Last 24 Hrs  T(C): 37.2 (2017 07:28), Max: 38 (2017 11:15)  T(F): 99 (2017 07:28), Max: 100.4 (2017 11:15)  HR: 76 (2017 08:55) (58 - 162)  BP: 105/55 (2017 07:00) (84/45 - 172/93)  BP(mean): 77 (2017 07:00) (60 - 82)  ABP: --  ABP(mean): --  RR: 17 (2017 07:00) (12 - 38)  SpO2: 100% (2017 08:55) (87% - 100%)    Vital Signs Last 24 Hrs  T(C): 37.2 (2017 07:28), Max: 38 (2017 11:15)  T(F): 99 (2017 07:28), Max: 100.4 (2017 11:15)  HR: 76 (2017 08:55) (58 - 162)  BP: 105/55 (2017 07:00) (84/45 - 172/93)  BP(mean): 77 (2017 07:00) (60 - 82)  RR: 17 (2017 07:00) (12 - 38)  SpO2: 100% (2017 08:55) (87% - 100%)    ABG - ( 2017 17:03 )  pH: 7.43  /  pCO2: 25    /  pO2: 137   / HCO3: 20    / Base Excess: -7.0  /  SaO2: 99                  I&O's Detail    2017 07:01  -  2017 07:00  --------------------------------------------------------  IN:    dextrose 10%.: 925 mL    dextrose 5%.: 300 mL    Enteral Tube Flush: 180 mL    Other: 200 mL    Sodium Chloride 0.9% IV Bolus: 1000 mL    Solution: 200 mL  Total IN: 2805 mL    OUT:    Indwelling Catheter - Urethral: 995 mL    Other: 100 mL  Total OUT: 1095 mL    Total NET: 1710 mL            LABS:                        9.6    6.0   )-----------( 163      ( 2017 05:51 )             29.8     07-22    142  |  112<H>  |  11  ----------------------------<  143<H>  3.0<L>   |  21<L>  |  0.97    Ca    7.1<L>      2017 05:51  Phos  2.1     07-22  Mg     2.0     -22    TPro  5.6<L>  /  Alb  2.4<L>  /  TBili  1.0  /  DBili  x   /  AST  32  /  ALT  28  /  AlkPhos  59  07-22      CARDIAC MARKERS ( 2017 05:51 )  x     / x     / 504 U/L / x     / x      CARDIAC MARKERS ( 2017 21:12 )  .034 ng/mL / x     / 514 U/L / x     / 13.0 ng/mL  CARDIAC MARKERS ( 2017 11:45 )  <.015 ng/mL / x     / 333 U/L / x     / x          CAPILLARY BLOOD GLUCOSE  151 (2017 05:00)        PT/INR - ( 2017 11:45 )   PT: 12.9 sec;   INR: 1.18 ratio           Urinalysis Basic - ( 2017 11:34 )    Color: Yellow / Appearance: Turbid / S.015 / pH: x  Gluc: x / Ketone: Negative  / Bili: Negative / Urobili: Negative   Blood: x / Protein: 75 mg/dL / Nitrite: Negative   Leuk Esterase: Moderate / RBC: 0-2 /HPF / WBC 0-2   Sq Epi: x / Non Sq Epi: x / Bacteria: Many      CULTURES:      Physical Examination:    General: No acute distress.      HEENT: Pupils equal, reactive to light.  Symmetric.    PULM: Trach intact. Few rhonchi Good excursion     CVS: Regular rate and rhythm, no murmurs, rubs, or gallops    ABD: Soft, nondistended, nontender, normoactive bowel sounds, no masses PEG intact    EXT: No edema, nontender    SKIN: Warm and well perfused, no rashes noted.    NEURO: Unresponsive to all stimuli. Contracted extremities.    RADIOLOGY: *** CT chest LLL infil    CRITICAL CARE TIME SPENT: ***

## 2017-07-22 NOTE — PROVIDER CONTACT NOTE (CHANGE IN STATUS NOTIFICATION) - ASSESSMENT
Patient is a 72yo female bedbound non-verbal with chronic tracheostomy for 4 1/2 years per daughter skin at site is intact allevyn foam applied at site to prevent injury

## 2017-07-22 NOTE — PROVIDER CONTACT NOTE (MEDICATION) - ACTION/TREATMENT ORDERED:
patient already on D10 at 50cc/hr,d50 12.5 gm given,and increased d10 to 75cc/hr,accucheck change to every 4 hrs,continue to monitor,

## 2017-07-22 NOTE — PROVIDER CONTACT NOTE (MEDICATION) - SITUATION
patient npo due to vomitting and abdominal distention Ct scan showed,no obstruction,but abdomen distended due to fecal impaction,giving laxatives

## 2017-07-22 NOTE — PROVIDER CONTACT NOTE (CRITICAL VALUE NOTIFICATION) - TEST AND RESULT REPORTED:
Blood Culture aerobic bottle gram positive cocci in clusters collected 7/21/2017
lactate  3.2
lactate 6.9

## 2017-07-22 NOTE — PROGRESS NOTE ADULT - SUBJECTIVE AND OBJECTIVE BOX
Patient is a 71y old  Female who presents with a chief complaint of resp distress (2017 13:51)      INTERVAL /OVERNIGHT EVENTS: now on trach collar    MEDICATIONS  (STANDING):  piperacillin/tazobactam IVPB. 3.375 Gram(s) IV Intermittent every 8 hours  lactobacillus acidophilus 1 Tablet(s) Oral every 8 hours  dextrose 5%. 1000 milliLiter(s) (50 mL/Hr) IV Continuous <Continuous>  dextrose 50% Injectable 12.5 Gram(s) IV Push once  dextrose 50% Injectable 25 Gram(s) IV Push once  dextrose 50% Injectable 25 Gram(s) IV Push once  artificial  tears Solution 1 Drop(s) Both EYES four times a day  fentaNYL   Patch  12 MICROgram(s)/Hr 1 Patch Transdermal every 72 hours  polyethylene glycol 3350 17 Gram(s) Oral daily  pantoprazole  Injectable 40 milliGRAM(s) IV Push every 12 hours  bisacodyl Suppository 10 milliGRAM(s) Rectal daily  dextrose 10%. 500 milliLiter(s) (75 mL/Hr) IV Continuous <Continuous>  simethicone 80 milliGRAM(s) Chew every 6 hours  senna 2 Tablet(s) Oral at bedtime    MEDICATIONS  (PRN):  sodium chloride 0.65% Nasal 1 Spray(s) Both Nostrils three times a day PRN Nasal Congestion  ALBUTerol    90 MICROgram(s) HFA Inhaler 4 Puff(s) Inhalation every 6 hours PRN Shortness of Breath and/or Wheezing      Allergies    codeine (Hives)    Intolerances        REVIEW OF SYSTEMS:  CONSTITUTIONAL: No fever, weight loss, or fatigue  EYES: No eye pain, visual disturbances, or discharge  ENMT:  No difficulty hearing, tinnitus, vertigo; No sinus or throat pain  NECK: No pain or stiffness  RESPIRATORY: No cough, wheezing, chills or hemoptysis; No shortness of breath  CARDIOVASCULAR: No chest pain, palpitations, dizziness, or leg swelling  GASTROINTESTINAL: No abdominal or epigastric pain. No nausea, vomiting, or hematemesis; No diarrhea or constipation. No melena or hematochezia.  GENITOURINARY: No dysuria, frequency, hematuria, or incontinence  NEUROLOGICAL: No headaches, memory loss, loss of strength, numbness, or tremors  SKIN: No itching, burning, rashes, or lesions   LYMPH NODES: No enlarged glands  ENDOCRINE: No heat or cold intolerance; No hair loss; No polydipsia or polyuria  MUSCULOSKELETAL: No joint pain or swelling; No muscle, back, or extremity pain  PSYCHIATRIC: No depression, anxiety, mood swings, or difficulty sleeping  HEME/LYMPH: No easy bruising, or bleeding gums  ALLERGY AND IMMUNOLOGIC: No hives or eczema    Vital Signs Last 24 Hrs  T(C): 37.4 (2017 12:00), Max: 37.4 (2017 12:00)  T(F): 99.4 (2017 12:00), Max: 99.4 (2017 12:00)  HR: 74 (2017 15:00) (58 - 100)  BP: 112/55 (2017 15:00) (88/51 - 136/63)  BP(mean): 79 (2017 15:00) (66 - 91)  RR: 10 (2017 15:00) (10 - 27)  SpO2: 100% (2017 15:00) (96% - 100%)    PHYSICAL EXAM:  GENERAL: NAD, well-groomed, well-developed  HEAD:  Atraumatic, Normocephalic  EYES: EOMI, PERRLA, conjunctiva and sclera clear  ENMT: No tonsillar erythema, exudates, or enlargement; Moist mucous membranes, Good dentition, No lesions  NECK: Supple, No JVD, Normal thyroid  NERVOUS SYSTEM: lethargic  CHEST/LUNG: Clear to auscultation bilaterally; No rales, rhonchi, wheezing, or rubs  HEART: Regular rate and rhythm; No murmurs, rubs, or gallops  ABDOMEN: Soft, Nontender, Nondistended; Bowel sounds present  EXTREMITIES:  2+ Peripheral Pulses, No clubbing, cyanosis, or edema  LYMPH: No lymphadenopathy noted  SKIN: No rashes or lesions    LABS:                        9.6    6.0   )-----------( 163      ( 2017 05:51 )             29.8     2017 05:51    142    |  112    |  11     ----------------------------<  143    3.0     |  21     |  0.97     Ca    7.1        2017 05:51  Phos  2.1       2017 05:51  Mg     2.0       2017 05:51    TPro  5.6    /  Alb  2.4    /  TBili  1.0    /  DBili  x      /  AST  32     /  ALT  28     /  AlkPhos  59     2017 05:51    PT/INR - ( 2017 11:45 )   PT: 12.9 sec;   INR: 1.18 ratio           Urinalysis Basic - ( 2017 11:34 )    Color: Yellow / Appearance: Turbid / S.015 / pH: x  Gluc: x / Ketone: Negative  / Bili: Negative / Urobili: Negative   Blood: x / Protein: 75 mg/dL / Nitrite: Negative   Leuk Esterase: Moderate / RBC: 0-2 /HPF / WBC 0-2   Sq Epi: x / Non Sq Epi: x / Bacteria: Many      CAPILLARY BLOOD GLUCOSE  192 (2017 14:00)  154 (2017 10:00)  151 (2017 05:00)  137 (2017 02:00)  180 (2017 22:30)  65 (2017 22:00)  65 (2017 19:18)  38 (2017 19:15)           RADIOLOGY & ADDITIONAL TESTS: < from: CT Chest No Cont (17 @ 18:22) >  EXAM:  CT CHEST                          EXAM:  CT ABDOMEN AND PELVIS OC                            PROCEDURE DATE:  2017          INTERPRETATION:  CT CHEST/ABDOMEN/PELVIS:     CLINICAL INFORMATION:  Sepsis, respiratory distress and vomiting.    COMPARISON: CT scan abdomen pelvis 2014.    PROCEDURE:  Using multislice helical CT, 2.5 mm sections were obtained   from the thoracic inlet through the ischial tuberosities.    Multiplanar reformatted images.     Image quality is degraded bypatient's arms and patient is rotated.    Tracheostomy tube is present. There is patchy bibasilar peribronchial   airspace opacities, which could reflect bronchopneumonia or chronic   aspiration pneumonia in the appropriate clinical setting. There are   atelectatic changes at both lung bases.  There is a trace left-sided pleural effusion.    There is an air-filled mildly dilated proximal esophagus.    There are small subcentimeter pretracheal mediastinal lymph nodes.  Evaluation the pulmonary hilum is limited without intravenous contrast.    There is a small pericardial effusion/pericardial thickening. Coronary   artery calcification is present.    The evaluation of the solid organ parenchyma is limited with out   intravenous contrast and streak artifact.    The liver, spleen and gallbladder appear grossly unremarkable.    < end of copied text >      Notes Reviewed:  [x ] YES  [ ] NO    Care Discussed with Consultants/Other Providers [x ] YES  [ ] NO

## 2017-07-22 NOTE — PROGRESS NOTE ADULT - SUBJECTIVE AND OBJECTIVE BOX
infectious diseases progress note:    BREA BECKHAM is a 71y y. o. Female patient    Patient nonverbal    Allergies    codeine (Hives)    Intolerances        ANTIBIOTICS/RELEVANT:  antimicrobials  piperacillin/tazobactam IVPB. 3.375 Gram(s) IV Intermittent every 8 hours    immunologic:    OTHER:  lactobacillus acidophilus 1 Tablet(s) Oral every 8 hours  dextrose 5%. 1000 milliLiter(s) IV Continuous <Continuous>  dextrose 50% Injectable 12.5 Gram(s) IV Push once  dextrose 50% Injectable 25 Gram(s) IV Push once  dextrose 50% Injectable 25 Gram(s) IV Push once  artificial  tears Solution 1 Drop(s) Both EYES four times a day  fentaNYL   Patch  12 MICROgram(s)/Hr 1 Patch Transdermal every 72 hours  polyethylene glycol 3350 17 Gram(s) Oral daily  sodium chloride 0.65% Nasal 1 Spray(s) Both Nostrils three times a day PRN  pantoprazole  Injectable 40 milliGRAM(s) IV Push every 12 hours  bisacodyl Suppository 10 milliGRAM(s) Rectal daily  dextrose 10%. 500 milliLiter(s) IV Continuous <Continuous>  potassium chloride  10 mEq/100 mL IVPB 10 milliEquivalent(s) IV Intermittent every 1 hour  ALBUTerol    90 MICROgram(s) HFA Inhaler 4 Puff(s) Inhalation every 6 hours PRN      Objective:  Last 24-Vital Signs Last 24 Hrs  T(C): 37.2 (2017 07:28), Max: 38 (2017 11:15)  T(F): 99 (2017 07:28), Max: 100.4 (2017 11:15)  HR: 61 (2017 07:00) (58 - 162)  BP: 105/55 (2017 07:00) (84/45 - 172/93)  BP(mean): 77 (2017 07:00) (60 - 82)  RR: 17 (2017 07:00) (12 - 38)  SpO2: 99% (2017 07:00) (87% - 100%)    T(C): 37.2 (2017 07:28), Max: 38 (2017 11:15)  T(F): 99 (2017 07:28), Max: 100.4 (2017 11:15)  T(C): 37.2 (2017 07:28), Max: 38 (2017 11:15)  T(F): 99 (2017 07:28), Max: 100.4 (2017 11:15)  T(C): 37.2 (2017 07:28), Max: 38 (2017 11:15)  T(F): 99 (2017 07:28), Max: 100.4 (2017 11:15)    PHYSICAL EXAM:  Constitutional:Well-developed, well nourished  Eyes:PERRLA, EOMI  Ear/Nose/Throat: oropharynx normal	  Neck:no JVD, no lymphadenopathy, supple, intubated  Respiratory: no accessory muscle use, coarse crackle left greater than right  Cardiovascular:distant  Gastrointestinal:soft, NT, no HSM, BS-normal  Extremities:no clubbing, no cyanosis, edema absent  Neuro-patient sedated, nonverbal, intubated  Skin-no sig lesions      LABS:                        9.6    6.0   )-----------( 163      ( 2017 05:51 )             29.8       WBC 6.0  -22 @ 05:51  WBC 9.8  07-21 @ 21:11  WBC 18.4  07-21 @ 11:45      07-22    142  |  112<H>  |  11  ----------------------------<  143<H>  3.0<L>   |  21<L>  |  0.97    Ca    7.1<L>      2017 05:51  Phos  2.1       Mg     2.0         TPro  5.6<L>  /  Alb  2.4<L>  /  TBili  1.0  /  DBili  x   /  AST  32  /  ALT  28  /  AlkPhos  59  -22    PT/INR - ( 2017 11:45 )   PT: 12.9 sec;   INR: 1.18 ratio           Urinalysis Basic - ( 2017 11:34 )    Color: Yellow / Appearance: Turbid / S.015 / pH: x  Gluc: x / Ketone: Negative  / Bili: Negative / Urobili: Negative   Blood: x / Protein: 75 mg/dL / Nitrite: Negative   Leuk Esterase: Moderate / RBC: 0-2 /HPF / WBC 0-2   Sq Epi: x / Non Sq Epi: x / Bacteria: Many          MICROBIOLOGY:        RADIOLOGY & ADDITIONAL STUDIES:

## 2017-07-23 DIAGNOSIS — J96.10 CHRONIC RESPIRATORY FAILURE, UNSPECIFIED WHETHER WITH HYPOXIA OR HYPERCAPNIA: ICD-10-CM

## 2017-07-23 DIAGNOSIS — E11.65 TYPE 2 DIABETES MELLITUS WITH HYPERGLYCEMIA: ICD-10-CM

## 2017-07-23 LAB
-  AMIKACIN: SIGNIFICANT CHANGE UP
-  AZTREONAM: SIGNIFICANT CHANGE UP
-  CEFEPIME: SIGNIFICANT CHANGE UP
-  CEFTAZIDIME: SIGNIFICANT CHANGE UP
-  CIPROFLOXACIN: SIGNIFICANT CHANGE UP
-  GENTAMICIN: SIGNIFICANT CHANGE UP
-  IMIPENEM: SIGNIFICANT CHANGE UP
-  LEVOFLOXACIN: SIGNIFICANT CHANGE UP
-  MEROPENEM: SIGNIFICANT CHANGE UP
-  PIPERACILLIN/TAZOBACTAM: SIGNIFICANT CHANGE UP
-  TOBRAMYCIN: SIGNIFICANT CHANGE UP
ANION GAP SERPL CALC-SCNC: 7 MMOL/L — SIGNIFICANT CHANGE UP (ref 5–17)
BUN SERPL-MCNC: 6 MG/DL — LOW (ref 7–23)
CALCIUM SERPL-MCNC: 7.9 MG/DL — LOW (ref 8.5–10.1)
CHLORIDE SERPL-SCNC: 112 MMOL/L — HIGH (ref 96–108)
CO2 SERPL-SCNC: 23 MMOL/L — SIGNIFICANT CHANGE UP (ref 22–31)
CREAT SERPL-MCNC: 1 MG/DL — SIGNIFICANT CHANGE UP (ref 0.5–1.3)
CULTURE RESULTS: SIGNIFICANT CHANGE UP
CULTURE RESULTS: SIGNIFICANT CHANGE UP
GLUCOSE SERPL-MCNC: 157 MG/DL — HIGH (ref 70–99)
HCT VFR BLD CALC: 35.5 % — SIGNIFICANT CHANGE UP (ref 34.5–45)
HGB BLD-MCNC: 11.5 G/DL — SIGNIFICANT CHANGE UP (ref 11.5–15.5)
MAGNESIUM SERPL-MCNC: 2.4 MG/DL — SIGNIFICANT CHANGE UP (ref 1.6–2.6)
MCHC RBC-ENTMCNC: 28 PG — SIGNIFICANT CHANGE UP (ref 27–34)
MCHC RBC-ENTMCNC: 32.5 GM/DL — SIGNIFICANT CHANGE UP (ref 32–36)
MCV RBC AUTO: 86 FL — SIGNIFICANT CHANGE UP (ref 80–100)
METHOD TYPE: SIGNIFICANT CHANGE UP
ORGANISM # SPEC MICROSCOPIC CNT: SIGNIFICANT CHANGE UP
PHOSPHATE SERPL-MCNC: 1.8 MG/DL — LOW (ref 2.5–4.5)
PLATELET # BLD AUTO: 191 K/UL — SIGNIFICANT CHANGE UP (ref 150–400)
POTASSIUM SERPL-MCNC: 3.7 MMOL/L — SIGNIFICANT CHANGE UP (ref 3.5–5.3)
POTASSIUM SERPL-SCNC: 3.7 MMOL/L — SIGNIFICANT CHANGE UP (ref 3.5–5.3)
RBC # BLD: 4.12 M/UL — SIGNIFICANT CHANGE UP (ref 3.8–5.2)
RBC # FLD: 13.8 % — SIGNIFICANT CHANGE UP (ref 10.3–14.5)
SODIUM SERPL-SCNC: 142 MMOL/L — SIGNIFICANT CHANGE UP (ref 135–145)
SPECIMEN SOURCE: SIGNIFICANT CHANGE UP
SPECIMEN SOURCE: SIGNIFICANT CHANGE UP
WBC # BLD: 5.1 K/UL — SIGNIFICANT CHANGE UP (ref 3.8–10.5)
WBC # FLD AUTO: 5.1 K/UL — SIGNIFICANT CHANGE UP (ref 3.8–10.5)

## 2017-07-23 PROCEDURE — 99291 CRITICAL CARE FIRST HOUR: CPT

## 2017-07-23 RX ORDER — DEXTROSE 10 % IN WATER 10 %
500 INTRAVENOUS SOLUTION INTRAVENOUS
Qty: 0 | Refills: 0 | Status: DISCONTINUED | OUTPATIENT
Start: 2017-07-24 | End: 2017-07-24

## 2017-07-23 RX ORDER — ENOXAPARIN SODIUM 100 MG/ML
40 INJECTION SUBCUTANEOUS DAILY
Qty: 0 | Refills: 0 | Status: DISCONTINUED | OUTPATIENT
Start: 2017-07-23 | End: 2017-07-26

## 2017-07-23 RX ORDER — TOBRAMYCIN SULFATE 40 MG/ML
300 VIAL (ML) INJECTION
Qty: 0 | Refills: 0 | Status: DISCONTINUED | OUTPATIENT
Start: 2017-07-23 | End: 2017-07-26

## 2017-07-23 RX ORDER — POTASSIUM CHLORIDE 20 MEQ
40 PACKET (EA) ORAL ONCE
Qty: 0 | Refills: 0 | Status: COMPLETED | OUTPATIENT
Start: 2017-07-23 | End: 2017-07-23

## 2017-07-23 RX ORDER — POTASSIUM PHOSPHATE, MONOBASIC POTASSIUM PHOSPHATE, DIBASIC 236; 224 MG/ML; MG/ML
15 INJECTION, SOLUTION INTRAVENOUS ONCE
Qty: 0 | Refills: 0 | Status: COMPLETED | OUTPATIENT
Start: 2017-07-23 | End: 2017-07-23

## 2017-07-23 RX ADMIN — SIMETHICONE 80 MILLIGRAM(S): 80 TABLET, CHEWABLE ORAL at 06:02

## 2017-07-23 RX ADMIN — ENOXAPARIN SODIUM 40 MILLIGRAM(S): 100 INJECTION SUBCUTANEOUS at 11:51

## 2017-07-23 RX ADMIN — SENNA PLUS 2 TABLET(S): 8.6 TABLET ORAL at 21:10

## 2017-07-23 RX ADMIN — PANTOPRAZOLE SODIUM 40 MILLIGRAM(S): 20 TABLET, DELAYED RELEASE ORAL at 17:55

## 2017-07-23 RX ADMIN — Medication 10 MILLIGRAM(S): at 11:52

## 2017-07-23 RX ADMIN — PANTOPRAZOLE SODIUM 40 MILLIGRAM(S): 20 TABLET, DELAYED RELEASE ORAL at 06:02

## 2017-07-23 RX ADMIN — Medication 1 TABLET(S): at 14:06

## 2017-07-23 RX ADMIN — Medication 1 DROP(S): at 06:02

## 2017-07-23 RX ADMIN — Medication 1 TABLET(S): at 06:02

## 2017-07-23 RX ADMIN — SIMETHICONE 80 MILLIGRAM(S): 80 TABLET, CHEWABLE ORAL at 12:33

## 2017-07-23 RX ADMIN — Medication 40 MILLIEQUIVALENT(S): at 08:17

## 2017-07-23 RX ADMIN — Medication 1 SPRAY(S): at 21:13

## 2017-07-23 RX ADMIN — Medication 1 TABLET(S): at 21:13

## 2017-07-23 RX ADMIN — SIMETHICONE 80 MILLIGRAM(S): 80 TABLET, CHEWABLE ORAL at 23:08

## 2017-07-23 RX ADMIN — Medication 1 DROP(S): at 00:06

## 2017-07-23 RX ADMIN — POTASSIUM PHOSPHATE, MONOBASIC POTASSIUM PHOSPHATE, DIBASIC 63.75 MILLIMOLE(S): 236; 224 INJECTION, SOLUTION INTRAVENOUS at 08:17

## 2017-07-23 RX ADMIN — PIPERACILLIN AND TAZOBACTAM 25 GRAM(S): 4; .5 INJECTION, POWDER, LYOPHILIZED, FOR SOLUTION INTRAVENOUS at 21:11

## 2017-07-23 RX ADMIN — Medication 1 DROP(S): at 11:51

## 2017-07-23 RX ADMIN — Medication 300 MILLIGRAM(S): at 19:42

## 2017-07-23 RX ADMIN — ALBUTEROL 4 PUFF(S): 90 AEROSOL, METERED ORAL at 19:42

## 2017-07-23 RX ADMIN — POLYETHYLENE GLYCOL 3350 17 GRAM(S): 17 POWDER, FOR SOLUTION ORAL at 11:51

## 2017-07-23 RX ADMIN — SIMETHICONE 80 MILLIGRAM(S): 80 TABLET, CHEWABLE ORAL at 17:56

## 2017-07-23 RX ADMIN — SIMETHICONE 80 MILLIGRAM(S): 80 TABLET, CHEWABLE ORAL at 00:06

## 2017-07-23 RX ADMIN — PIPERACILLIN AND TAZOBACTAM 25 GRAM(S): 4; .5 INJECTION, POWDER, LYOPHILIZED, FOR SOLUTION INTRAVENOUS at 14:06

## 2017-07-23 RX ADMIN — Medication 1 DROP(S): at 23:08

## 2017-07-23 RX ADMIN — PIPERACILLIN AND TAZOBACTAM 25 GRAM(S): 4; .5 INJECTION, POWDER, LYOPHILIZED, FOR SOLUTION INTRAVENOUS at 06:01

## 2017-07-23 RX ADMIN — Medication 1 DROP(S): at 17:56

## 2017-07-23 NOTE — PROGRESS NOTE ADULT - SUBJECTIVE AND OBJECTIVE BOX
Date/Time Patient Seen:  		  Referring MD:   Data Reviewed	       Patient is a 71y old  Female who presents with a chief complaint of resp distress (21 Jul 2017 13:51)  in bed  seen and examined  trach in place  vs and meds reviewed    covering for Dr. Mason        Subjective/HPI     PAST MEDICAL & SURGICAL HISTORY:  Respiratory failure  Constipation  GERD (gastroesophageal reflux disease)  Hypertension  Respiratory failure  Diabetes mellitus  Aphasic stroke  Dementia of frontal lobe type  Tracheostomy in place  Gastrostomy in place  Hx of appendectomy        Medication list         MEDICATIONS  (STANDING):  piperacillin/tazobactam IVPB. 3.375 Gram(s) IV Intermittent every 8 hours  lactobacillus acidophilus 1 Tablet(s) Oral every 8 hours  dextrose 5%. 1000 milliLiter(s) (50 mL/Hr) IV Continuous <Continuous>  dextrose 50% Injectable 12.5 Gram(s) IV Push once  dextrose 50% Injectable 25 Gram(s) IV Push once  dextrose 50% Injectable 25 Gram(s) IV Push once  artificial  tears Solution 1 Drop(s) Both EYES four times a day  fentaNYL   Patch  12 MICROgram(s)/Hr 1 Patch Transdermal every 72 hours  polyethylene glycol 3350 17 Gram(s) Oral daily  pantoprazole  Injectable 40 milliGRAM(s) IV Push every 12 hours  bisacodyl Suppository 10 milliGRAM(s) Rectal daily  simethicone 80 milliGRAM(s) Chew every 6 hours  senna 2 Tablet(s) Oral at bedtime  dextrose 10%. 500 milliLiter(s) (25 mL/Hr) IV Continuous <Continuous>    MEDICATIONS  (PRN):  sodium chloride 0.65% Nasal 1 Spray(s) Both Nostrils three times a day PRN Nasal Congestion  ALBUTerol    90 MICROgram(s) HFA Inhaler 4 Puff(s) Inhalation every 6 hours PRN Shortness of Breath and/or Wheezing         Vitals log        ICU Vital Signs Last 24 Hrs  T(C): 36.6 (23 Jul 2017 04:00), Max: 37.5 (22 Jul 2017 16:30)  T(F): 97.8 (23 Jul 2017 04:00), Max: 99.5 (22 Jul 2017 16:30)  HR: 97 (23 Jul 2017 05:00) (60 - 100)  BP: 149/69 (23 Jul 2017 05:00) (100/54 - 149/69)  BP(mean): 99 (23 Jul 2017 05:00) (73 - 99)  ABP: --  ABP(mean): --  RR: 23 (23 Jul 2017 05:00) (10 - 27)  SpO2: 100% (23 Jul 2017 05:00) (97% - 100%)       Mode: standby      Input and Output:  I&O's Detail    21 Jul 2017 07:01  -  22 Jul 2017 07:00  --------------------------------------------------------  IN:    dextrose 10%: 925 mL    dextrose 5%.: 300 mL    Enteral Tube Flush: 180 mL    Other: 200 mL    Sodium Chloride 0.9% IV Bolus: 1000 mL    Solution: 200 mL  Total IN: 2805 mL    OUT:    Indwelling Catheter - Urethral: 995 mL    Other: 100 mL  Total OUT: 1095 mL    Total NET: 1710 mL      22 Jul 2017 07:01  -  23 Jul 2017 05:59  --------------------------------------------------------  IN:    dextrose 10%: 576 mL    dextrose 10%.: 300 mL    Enteral Tube Flush: 500 mL    Glucerna: 340 mL    Solution: 500 mL  Total IN: 2216 mL    OUT:    Indwelling Catheter - Urethral: 2650 mL  Total OUT: 2650 mL    Total NET: -434 mL          Lab Data                        11.5   5.1   )-----------( 191      ( 23 Jul 2017 05:32 )             35.5     07-23    142  |  112<H>  |  6<L>  ----------------------------<  157<H>  3.7   |  23  |  1.00    Ca    7.9<L>      23 Jul 2017 05:32  Phos  2.1     07-22  Mg     2.0     07-22    TPro  5.6<L>  /  Alb  2.4<L>  /  TBili  1.0  /  DBili  x   /  AST  32  /  ALT  28  /  AlkPhos  59  07-22    ABG - ( 21 Jul 2017 17:03 )  pH: 7.43  /  pCO2: 25    /  pO2: 137   / HCO3: 20    / Base Excess: -7.0  /  SaO2: 99                CARDIAC MARKERS ( 22 Jul 2017 05:51 )  x     / x     / 504 U/L / x     / x      CARDIAC MARKERS ( 21 Jul 2017 21:12 )  .034 ng/mL / x     / 514 U/L / x     / 13.0 ng/mL  CARDIAC MARKERS ( 21 Jul 2017 11:45 )  <.015 ng/mL / x     / 333 U/L / x     / x            Review of Systems	      Objective     Physical Examination  head at  heart - s1s2  lungs - dec BS  abd - soft        Pertinent Lab findings & Imaging      Annalise:  NO   Adequate UO     I&O's Detail    21 Jul 2017 07:01  -  22 Jul 2017 07:00  --------------------------------------------------------  IN:    dextrose 10%: 925 mL    dextrose 5%.: 300 mL    Enteral Tube Flush: 180 mL    Other: 200 mL    Sodium Chloride 0.9% IV Bolus: 1000 mL    Solution: 200 mL  Total IN: 2805 mL    OUT:    Indwelling Catheter - Urethral: 995 mL    Other: 100 mL  Total OUT: 1095 mL    Total NET: 1710 mL      22 Jul 2017 07:01  -  23 Jul 2017 05:59  --------------------------------------------------------  IN:    dextrose 10%: 576 mL    dextrose 10%.: 300 mL    Enteral Tube Flush: 500 mL    Glucerna: 340 mL    Solution: 500 mL  Total IN: 2216 mL    OUT:    Indwelling Catheter - Urethral: 2650 mL  Total OUT: 2650 mL    Total NET: -434 mL               Discussed with:     Cultures:	        Radiology

## 2017-07-23 NOTE — PROGRESS NOTE ADULT - SUBJECTIVE AND OBJECTIVE BOX
Interval events: no acute events overnight. No fevers.     Review of Systems:  Constitutional: no fever, chills, fatigue  Neuro: no headache, numbness, weakness  Resp: no cough, wheezing, shortness of breath  CVS: no chest pain, palpitations, leg swelling  GI: no abdominal pain, nausea, vomiting, diarrhea   : no dysuria, frequency, incontinence  Skin: no itching, burning, rashes, or lesions   Msk: no joint pain or swelling  Psych: no depression, anxiety    T(F): 98.4 (17 @ 07:35), Max: 99.5 (17 @ 16:30)  HR: 71 (17 @ 08:00) (62 - 97)  BP: 150/65 (17 @ 08:00) (103/51 - 150/65)  RR: 19 (17 @ 08:00) (10 - 27)  SpO2: 99% (17 @ 08:00) (97% - 100%)  Wt(kg): --    Mode: standby    CAPILLARY BLOOD GLUCOSE  150 (2017 08:00)        I&O's Summary    2017 07:  -  2017 07:00  --------------------------------------------------------  IN: 2466 mL / OUT: 2900 mL / NET: -434 mL    2017 07:  -  2017 08:47  --------------------------------------------------------  IN: 270 mL / OUT: 150 mL / NET: 120 mL        Physical Exam:     Gen:  Neuro:  HEENT:  CV:  Pulm:  GI:  Ext:  Skin:    Meds:    piperacillin/tazobactam IVPB. 3.375 Gram(s) IV Intermittent every 8 hours      dextrose 50% Injectable 12.5 Gram(s) IV Push once  dextrose 50% Injectable 25 Gram(s) IV Push once  dextrose 50% Injectable 25 Gram(s) IV Push once    ALBUTerol    90 MICROgram(s) HFA Inhaler 4 Puff(s) Inhalation every 6 hours PRN    fentaNYL   Patch  12 MICROgram(s)/Hr 1 Patch Transdermal every 72 hours    polyethylene glycol 3350 17 Gram(s) Oral daily  pantoprazole  Injectable 40 milliGRAM(s) IV Push every 12 hours  bisacodyl Suppository 10 milliGRAM(s) Rectal daily  simethicone 80 milliGRAM(s) Chew every 6 hours  senna 2 Tablet(s) Oral at bedtime        dextrose 5%. 1000 milliLiter(s) IV Continuous <Continuous>      artificial  tears Solution 1 Drop(s) Both EYES four times a day  sodium chloride 0.65% Nasal 1 Spray(s) Both Nostrils three times a day PRN    lactobacillus acidophilus 1 Tablet(s) Oral every 8 hours                                11.5   5.1   )-----------( 191      ( 2017 05:32 )             35.5       07-23    142  |  112<H>  |  6<L>  ----------------------------<  157<H>  3.7   |  23  |  1.00    Ca    7.9<L>      2017 05:32  Phos  1.8     07-23  Mg     2.4     07-23    TPro  5.6<L>  /  Alb  2.4<L>  /  TBili  1.0  /  DBili  x   /  AST  32  /  ALT  28  /  AlkPhos  59  07-22      CARDIAC MARKERS ( 2017 05:51 )  x     / x     / 504 U/L / x     / x      CARDIAC MARKERS ( 2017 21:12 )  .034 ng/mL / x     / 514 U/L / x     / 13.0 ng/mL  CARDIAC MARKERS ( 2017 11:45 )  <.015 ng/mL / x     / 333 U/L / x     / x          PT/INR - ( 2017 11:45 )   PT: 12.9 sec;   INR: 1.18 ratio           Urinalysis Basic - ( 2017 11:34 )    Color: Yellow / Appearance: Turbid / S.015 / pH: x  Gluc: x / Ketone: Negative  / Bili: Negative / Urobili: Negative   Blood: x / Protein: 75 mg/dL / Nitrite: Negative   Leuk Esterase: Moderate / RBC: 0-2 /HPF / WBC 0-2   Sq Epi: x / Non Sq Epi: x / Bacteria: Many            ABG - ( 2017 17:03 )  pH: 7.43  /  pCO2: 25    /  pO2: 137   / HCO3: 20    / Base Excess: -7.0  /  SaO2: 99                  Radiology: ***    Bedside Lung U/S: ***    Bedside Cardiac U/S: ***    CENTRAL LINE: Y/N   DATE INSERTED:   REMOVE: Y/N    REID: Y/N      DATE INSERTED:        REMOVE: Y/N    A-LINE: Y/N     DATE INSERTED:              REMOVE: Y/N    GLOBAL ISSUE/BEST PRACTICE:  Analgesia:  Sedation:  HOB elevation: yes  Stress ulcer prophylaxis:  VTE prophylaxis:  Glycemic control:  Nutrition:    CODE STATUS: *** Interval events: no acute events overnight. No fevers. Had leak with trach, did well on trach collar though    Review of Systems: unable to obtain due to dementia    T(F): 98.4 (07-23-17 @ 07:35), Max: 99.5 (07-22-17 @ 16:30)  HR: 71 (07-23-17 @ 08:00) (62 - 97)  BP: 150/65 (07-23-17 @ 08:00) (103/51 - 150/65)  RR: 19 (07-23-17 @ 08:00) (10 - 27)  SpO2: 99% (07-23-17 @ 08:00) (97% - 100%)    Trach collar at 40%    CAPILLARY BLOOD GLUCOSE  138-192    I&O's Summary    22 Jul 2017 07:01  -  23 Jul 2017 07:00  --------------------------------------------------------  IN: 2466 mL / OUT: 2900 mL / NET: -434 mL    Physical Exam:     Gen: NAD, laying in bed  Neuro: awake, not alert; contracted  HEENT: PERRL, MMM; +oral secretions, trach in place  Resp: clear to auscultation anteriorly bilaterally  CVS: normal S1 & S2; regular rate and rhythm   Abd: soft, mild distension; NT; +PEG, site c/d/i  Ext: no edema, contracted UE and LE    Meds:  piperacillin/tazobactam IVPB. 3.375 Gram(s) IV Intermittent every 8 hours  ALBUTerol    90 MICROgram(s) HFA Inhaler 4 Puff(s) Inhalation every 6 hours PRN  fentaNYL   Patch  12 MICROgram(s)/Hr 1 Patch Transdermal every 72 hours  polyethylene glycol 3350 17 Gram(s) Oral daily  pantoprazole  Injectable 40 milliGRAM(s) IV Push every 12 hours  bisacodyl Suppository 10 milliGRAM(s) Rectal daily  simethicone 80 milliGRAM(s) Chew every 6 hours  senna 2 Tablet(s) Oral at bedtime  artificial  tears Solution 1 Drop(s) Both EYES four times a day  sodium chloride 0.65% Nasal 1 Spray(s) Both Nostrils three times a day PRN  lactobacillus acidophilus 1 Tablet(s) Oral every 8 hours                        11.5   5.1   )-----------( 191      ( 23 Jul 2017 05:32 )             35.5     142  |  112  |  6  ----------------------------<  157<H>  3.7   |  23  |  1.00    Ca    7.9      23 Jul 2017 05:32  Phos  1.8     07-23  Mg     2.4     07-23    TPro  5.6<L>  /  Alb  2.4<L>  /  TBili  1.0  /  DBili  x   /  AST  32  /  ALT  28  /  AlkPhos  59  07-22    CARDIAC MARKERS ( 22 Jul 2017 05:51 )  x     / x     / 504 U/L / x     / x      CARDIAC MARKERS ( 21 Jul 2017 21:12 )  .034 ng/mL / x     / 514 U/L / x     / 13.0 ng/mL  CARDIAC MARKERS ( 21 Jul 2017 11:45 )  <.015 ng/mL / x     / 333 U/L / x     / x        PT/INR - ( 21 Jul 2017 11:45 )   PT: 12.9 sec;   INR: 1.18 ratio      Urinalysis 7/21: negative nitrite, moderate leuk esterase, no WBC, no RBC, many bacteria, neg ketone    ABG - ( 21 Jul 2017 17:03 )  pH: 7.43  /  pCO2: 25    /  pO2: 137   / HCO3: 20    / Base Excess: -7.0  /  SaO2: 99        Blood culture: coag negative staph in 1/4 bottles (contaminant)    Endotracheal culture: pseudomonas aeruginosa    CENTRAL LINE: N    REID: Y (CHRONIC)    A-LINE: N    GLOBAL ISSUE/BEST PRACTICE:  Analgesia: n/a  Sedation: n/a  HOB elevation: yes  Stress ulcer prophylaxis: yes  VTE prophylaxis: yes  Glycemic control: yes  Nutrition: yes    CODE STATUS: full

## 2017-07-23 NOTE — CONSULT NOTE ADULT - PROBLEM SELECTOR RECOMMENDATION 9
cont fs bg monitoring  goal 140-180 in icu setting
? etiology, ? secondary to bowel obstruction vs ileus (fecal impaction) +/- gastroparesis   f/u CT imaging  protonix 40 mg daily -- low pH can cause gastric distention  PEG tube to suction  consider erythromycin therapy with peg tube feeds if no obstruction noted  if no improvement -- possible J tube placement to prevent vomiting  aspiration precautions
Try weaning

## 2017-07-23 NOTE — PROGRESS NOTE ADULT - SUBJECTIVE AND OBJECTIVE BOX
Patient is a 71y old  Female who presents with a chief complaint of resp distress (21 Jul 2017 13:51)      INTERVAL HPI/OVERNIGHT EVENTS: Patient seen and examined.      Vital Signs Last 24 Hrs  T(C): 37.2 (23 Jul 2017 11:30), Max: 37.5 (22 Jul 2017 16:30)  T(F): 98.9 (23 Jul 2017 11:30), Max: 99.5 (22 Jul 2017 16:30)  HR: 71 (23 Jul 2017 11:30) (62 - 106)  BP: 136/63 (23 Jul 2017 11:30) (103/51 - 200/83)  BP(mean): 91 (23 Jul 2017 11:30) (73 - 119)  RR: 21 (23 Jul 2017 11:30) (10 - 32)  SpO2: 100% (23 Jul 2017 11:30) (97% - 100%)I&O's Summary    22 Jul 2017 07:01  -  23 Jul 2017 07:00  --------------------------------------------------------  IN: 2466 mL / OUT: 2900 mL / NET: -434 mL    23 Jul 2017 07:01  -  23 Jul 2017 13:21  --------------------------------------------------------  IN: 480 mL / OUT: 485 mL / NET: -5 mL        LABS:                        11.5   5.1   )-----------( 191      ( 23 Jul 2017 05:32 )             35.5     07-23    142  |  112<H>  |  6<L>  ----------------------------<  157<H>  3.7   |  23  |  1.00    Ca    7.9<L>      23 Jul 2017 05:32  Phos  1.8     07-23  Mg     2.4     07-23    TPro  5.6<L>  /  Alb  2.4<L>  /  TBili  1.0  /  DBili  x   /  AST  32  /  ALT  28  /  AlkPhos  59  07-22        CAPILLARY BLOOD GLUCOSE  146 (23 Jul 2017 11:50)  150 (23 Jul 2017 08:00)  159 (23 Jul 2017 04:00)  141 (23 Jul 2017 00:00)  138 (22 Jul 2017 20:38)  192 (22 Jul 2017 14:00)              piperacillin/tazobactam IVPB. 3.375 Gram(s) IV Intermittent every 8 hours  lactobacillus acidophilus 1 Tablet(s) Oral every 8 hours  dextrose 5%. 1000 milliLiter(s) IV Continuous <Continuous>  dextrose 50% Injectable 12.5 Gram(s) IV Push once  dextrose 50% Injectable 25 Gram(s) IV Push once  dextrose 50% Injectable 25 Gram(s) IV Push once  artificial  tears Solution 1 Drop(s) Both EYES four times a day  fentaNYL   Patch  12 MICROgram(s)/Hr 1 Patch Transdermal every 72 hours  polyethylene glycol 3350 17 Gram(s) Oral daily  sodium chloride 0.65% Nasal 1 Spray(s) Both Nostrils three times a day PRN  pantoprazole  Injectable 40 milliGRAM(s) IV Push every 12 hours  bisacodyl Suppository 10 milliGRAM(s) Rectal daily  ALBUTerol    90 MICROgram(s) HFA Inhaler 4 Puff(s) Inhalation every 6 hours PRN  simethicone 80 milliGRAM(s) Chew every 6 hours  senna 2 Tablet(s) Oral at bedtime  enoxaparin Injectable 40 milliGRAM(s) SubCutaneous daily  tobramycin for Nebulization 300 milliGRAM(s) Inhalation two times a day      REVIEW OF SYSTEMS:  CONSTITUTIONAL: No fever, weight loss, or fatigue  NECK: No pain or stiffness  RESPIRATORY: No cough, wheezing, chills or hemoptysis; No shortness of breath  CARDIOVASCULAR: No chest pain, palpitations, dizziness, or leg swelling  GASTROINTESTINAL: No abdominal or epigastric pain. No nausea, vomiting, or hematemesis; No diarrhea or constipation. No melena or hematochezia.  GENITOURINARY: No dysuria, frequency, hematuria, or incontinence  NEUROLOGICAL: No headaches, loss of strength, numbness, or tremors  SKIN: No itching, burning  MUSCULOSKELETAL: No joint pain or swelling; No muscle, back, or extremity pain  PSYCHIATRIC: No depression, mood swings, HEME/LYMPH: No easy bruising, or bleeding gums  ALLERY AND IMMUNOLOGIC: No hives       Consultant(s) Notes Reviewed:  [ x] YES  [ ] NO    PHYSICAL EXAM:  GENERAL: NAD, well-groomed, well-developed  HEAD:  Atraumatic, Normocephalic, trach collar  EYES: EOMI, PERRLA, conjunctiva and sclera clear  ENMT: No tonsillar erythema, exudates, or enlargement; Moist mucous membranes  NECK: Supple, No JVD  NERVOUS SYSTEM:  Awake & alert  CHEST/LUNG: Clear to auscultation bilaterally; No rales, rhonchi, wheezing,  HEART: Regular rate and rhythm  ABDOMEN: Soft, Nontender, Nondistended; Bowel sounds present  EXTREMITIES:  No clubbing, cyanosis, or edema  LYMPH: No lymphadenopathy noted  SKIN: No rashes      Advanced care planning discussed with patient/family [x ] YES   [ ] NO

## 2017-07-23 NOTE — PROGRESS NOTE ADULT - SUBJECTIVE AND OBJECTIVE BOX
infectious diseases progress note:    BREA BECKHAM is a 71y y. o. Female patient    Patient nonverbal    Allergies    codeine (Hives)    Intolerances        ANTIBIOTICS/RELEVANT:  antimicrobials  piperacillin/tazobactam IVPB. 3.375 Gram(s) IV Intermittent every 8 hours  tobramycin for Nebulization 300 milliGRAM(s) Inhalation two times a day    immunologic:    OTHER:  lactobacillus acidophilus 1 Tablet(s) Oral every 8 hours  dextrose 5%. 1000 milliLiter(s) IV Continuous <Continuous>  dextrose 50% Injectable 12.5 Gram(s) IV Push once  dextrose 50% Injectable 25 Gram(s) IV Push once  dextrose 50% Injectable 25 Gram(s) IV Push once  artificial  tears Solution 1 Drop(s) Both EYES four times a day  fentaNYL   Patch  12 MICROgram(s)/Hr 1 Patch Transdermal every 72 hours  polyethylene glycol 3350 17 Gram(s) Oral daily  sodium chloride 0.65% Nasal 1 Spray(s) Both Nostrils three times a day PRN  pantoprazole  Injectable 40 milliGRAM(s) IV Push every 12 hours  bisacodyl Suppository 10 milliGRAM(s) Rectal daily  ALBUTerol    90 MICROgram(s) HFA Inhaler 4 Puff(s) Inhalation every 6 hours PRN  simethicone 80 milliGRAM(s) Chew every 6 hours  senna 2 Tablet(s) Oral at bedtime  enoxaparin Injectable 40 milliGRAM(s) SubCutaneous daily      Objective:  Last 24-Vital Signs Last 24 Hrs  T(C): 36.9 (2017 07:35), Max: 37.5 (2017 16:30)  T(F): 98.4 (2017 07:35), Max: 99.5 (2017 16:30)  HR: 73 (2017 09:30) (62 - 106)  BP: 133/64 (2017 09:30) (103/51 - 200/83)  BP(mean): 92 (2017 09:30) (73 - 119)  RR: 20 (2017 09:30) (10 - 32)  SpO2: 99% (2017 09:30) (97% - 100%)    T(C): 36.9 (2017 07:35), Max: 38 (2017 11:15)  T(F): 98.4 (2017 07:35), Max: 100.4 (2017 11:15)  T(C): 36.9 (2017 07:35), Max: 38 (2017 11:15)  T(F): 98.4 (2017 07:35), Max: 100.4 (2017 11:15)  T(C): 36.9 (2017 07:35), Max: 38 (2017 11:15)  T(F): 98.4 (2017 07:35), Max: 100.4 (2017 11:15)    PHYSICAL EXAM:  Constitutional:Well-developed, well nourished  Eyes:PERRLA, EOMI  Ear/Nose/Throat: oropharynx normal	  Neck:no JVD, no lymphadenopathy, supple, trach in place  Respiratory: no accessory muscle use, lung fields bilaterally with symetrical coarse BS  Cardiovascular:RRR, normal S1, S2 no m/r/g  Gastrointestinal:soft, NT, no HSM, BS-normal  Extremities:no clubbing, no cyanosis, edema absent  Neuro-patient nonconversant  Skin-no sig lesions      LABS:                        11.5   5.1   )-----------( 191      ( 2017 05:32 )             35.5       WBC 5.1  0723 @ 05:32  WBC 6.0  07-22 @ 05:51  WBC 9.8  21 @ 21:11  WBC 18.4  0721 @ 11:45      07-23    142  |  112<H>  |  6<L>  ----------------------------<  157<H>  3.7   |  23  |  1.00    Ca    7.9<L>      2017 05:32  Phos  1.8     0723  Mg     2.4     23    TPro  5.6<L>  /  Alb  2.4<L>  /  TBili  1.0  /  DBili  x   /  AST  32  /  ALT  28  /  AlkPhos  59  07-22    PT/INR - ( 2017 11:45 )   PT: 12.9 sec;   INR: 1.18 ratio           Urinalysis Basic - ( 2017 11:34 )    Color: Yellow / Appearance: Turbid / S.015 / pH: x  Gluc: x / Ketone: Negative  / Bili: Negative / Urobili: Negative   Blood: x / Protein: 75 mg/dL / Nitrite: Negative   Leuk Esterase: Moderate / RBC: 0-2 /HPF / WBC 0-2   Sq Epi: x / Non Sq Epi: x / Bacteria: Many          MICROBIOLOGY:    Culture - Sputum . (17 @ 21:39)    Gram Stain:   Numerous polymorphonuclear leukocytes per low power field  No Squamous epithelial cells per low power field  Few Gram Negative Rods per oil power field  Rare Gram positive cocci in pairs per oil power field    Specimen Source: .Sputum Sputum / TRAP    Culture Results:   Few Pseudomonas aeruginosa  Normal Respiratory Elvira present    Culture - Blood (17 @ 16:40)    -  Candida tropicalis: Nondet    -  Coagulase negative Staphylococcus: Detec    -  Enterobacter cloacae complex: Nondet    -  Enterococcus species: Nondet    -  Pseudomonas aeruginosa: Nondet    -  Streptococcus pyogenes (Group A): Nondet    -  Acinetobacter baumanii: Nondet    -  Candida parapsilosis: Nondet    -  Escherichia coli: Nondet    -  Haemophilus influenzae: Nondet    -  Serratia marcescens: Nondet    Gram Stain:   Growth in aerobic bottle: Gram Positive Cocci in Clusters    -  Candida albicans: Nondet    -  Candida glabrata: Nondet    -  Listeria monocytogenes: Nondet    -  Multidrug (KPC pos) resistant organism: Nondet    -  Neisseria meningitidis: Nondet    -  Proteus species: Nondet    -  Streptococcus agalactiae (Group B): Nondet    -  Streptococcus sp. (Not Grp A, B or S pneumoniae): Nondet    -  Candida krusei: Nondet    -  Klebsiella oxytoca: Nondet    -  Klebsiella pneumoniae: Nondet    -  Methicillin resistant Staphylococcus aureus (MRSA): Nondet    -  Staphylococcus aureus: Nondet    -  Streptococcus pneumoniae: Nondet    -  Vancomycin resistant Enterococcus sp.: Nondet    Specimen Source: .Blood Blood-Peripheral    Organism: Blood Culture PCR    Culture Results:   Growth in aerobic bottle: Gram Positive Cocci in Clusters  ***Blood Panel PCR results on this specimen are available  approximately 3 hours after the Gram stain result.***  Gram stain, PCR, and/or culture results may not always  correspond due to difference in methodologies.    Organism Identification: Blood Culture PCR    Method Type: PCR          RADIOLOGY & ADDITIONAL STUDIES:

## 2017-07-23 NOTE — CONSULT NOTE ADULT - ASSESSMENT
70 yo female with dementia with vomiting, constipation
A/P: 71 year old female admitted with respiratory distress and possible GIB/sepsis with a history of tracheostomy, gastrostomy, s/p CVA who lives in a nursing home.  Neuro: no sedation or pain medication indicated  Cardio: IV fluids  Pulm: respiratory distress: on Ventilator, FI02 weaned down to 30%, FU CT scan of Chest  GI: possible GI bleed: CT scan of Abdomen/pelvis with Oral contrast via PEG tube  Renal: RYAN likely 2/2 sepsis, patient receiving fluids and treatment for sepsis, questionable UTI FU urine Cx  Endocrine: Patient with hypoglycemia, placed on hypoglycemia protocol  ID: sepsis of unknown source likely secondary to GIB or pneumonia; patient received vancomycin/zosyn in the ED, continuing Zosyn stopping vancomycin, FU heme  Heme: DVT ppx with SCDs  Skin: Woody in place for chronic urinary retention,  Dispo: Full Code
Pt with chronic vent, CVA, admitted for tachypnea. Pneumonia, acute on chronic respiratory failure. ? Sepsis. Anemia, ? GI bleed.
hypoglycemia due to malnutrition

## 2017-07-23 NOTE — CONSULT NOTE ADULT - SUBJECTIVE AND OBJECTIVE BOX
Patient is a 71y old  Female who presents with a chief complaint of resp distress (21 Jul 2017 13:51)      Reason For Consult:     HPI:  The patient is a 71 year old female with a history of dementia, tracheostomy, and gastrostomy, s/p aphasic stroke who is vent dependent and a resident at a nursing home. Per  over the past two weeks the patient has had progressive difficulty with PEG tube feeding. The patient has vomited several times over the last two weeks and had drainage of dark fluid from the PEG tube described as coffee grounds. The stomach was decompressed earlier this week at the nursing home with a vaughn catheter via the PEG tube. The last episode of vomiting was last night. The patient began to have progressive difficulty of breath earlier today, which led the use of AMBU bag for ventilation and subsequent transfer to the ED (21 Jul 2017 13:51)      PAST MEDICAL & SURGICAL HISTORY:  Respiratory failure  Constipation  GERD (gastroesophageal reflux disease)  Hypertension  Respiratory failure  Diabetes mellitus  Aphasic stroke  Dementia of frontal lobe type  Tracheostomy in place  Gastrostomy in place  Hx of appendectomy      FAMILY HISTORY:  No pertinent family history in first degree relatives        Social History:    MEDICATIONS  (STANDING):  piperacillin/tazobactam IVPB. 3.375 Gram(s) IV Intermittent every 8 hours  lactobacillus acidophilus 1 Tablet(s) Oral every 8 hours  dextrose 5%. 1000 milliLiter(s) (50 mL/Hr) IV Continuous <Continuous>  dextrose 50% Injectable 12.5 Gram(s) IV Push once  dextrose 50% Injectable 25 Gram(s) IV Push once  dextrose 50% Injectable 25 Gram(s) IV Push once  artificial  tears Solution 1 Drop(s) Both EYES four times a day  fentaNYL   Patch  12 MICROgram(s)/Hr 1 Patch Transdermal every 72 hours  polyethylene glycol 3350 17 Gram(s) Oral daily  pantoprazole  Injectable 40 milliGRAM(s) IV Push every 12 hours  bisacodyl Suppository 10 milliGRAM(s) Rectal daily  simethicone 80 milliGRAM(s) Chew every 6 hours  senna 2 Tablet(s) Oral at bedtime  enoxaparin Injectable 40 milliGRAM(s) SubCutaneous daily  tobramycin for Nebulization 300 milliGRAM(s) Inhalation two times a day    MEDICATIONS  (PRN):  sodium chloride 0.65% Nasal 1 Spray(s) Both Nostrils three times a day PRN Nasal Congestion  ALBUTerol    90 MICROgram(s) HFA Inhaler 4 Puff(s) Inhalation every 6 hours PRN Shortness of Breath and/or Wheezing        T(C): 36.9 (07-23-17 @ 07:35), Max: 37.5 (07-22-17 @ 16:30)  HR: 73 (07-23-17 @ 09:30) (62 - 106)  BP: 133/64 (07-23-17 @ 09:30) (103/51 - 200/83)  RR: 20 (07-23-17 @ 09:30) (10 - 32)  SpO2: 99% (07-23-17 @ 09:30) (97% - 100%)  Wt(kg): --    PHYSICAL EXAM:  GENERAL: NAD, well-groomed, well-developed  HEAD:  Atraumatic, Normocephalic  NECK: Supple, No JVD, Normal thyroid  CHEST/LUNG: Clear to percussion bilaterally; No rales, rhonchi, wheezing, or rubs  HEART: Regular rate and rhythm; No murmurs, rubs, or gallops  ABDOMEN: Soft, Nontender, Nondistended; Bowel sounds present  EXTREMITIES:  2+ Peripheral Pulses, No clubbing, cyanosis, or edema  SKIN: No rashes or lesions    CAPILLARY BLOOD GLUCOSE  150 (23 Jul 2017 08:00)  159 (23 Jul 2017 04:00)  141 (23 Jul 2017 00:00)  138 (22 Jul 2017 20:38)  192 (22 Jul 2017 14:00)  154 (22 Jul 2017 10:00)                                11.5   5.1   )-----------( 191      ( 23 Jul 2017 05:32 )             35.5       CMP:  07-23 @ 05:32  SGPT --  Albumin --   Alk Phos --   Anion Gap 7   SGOT --   Total Bili --   BUN 6   Calcium Total 7.9   CO2 23   Chloride 112   Creatinine 1.00   eGFR if AA 65   eGFR if non AA 56   Glucose 157   Potassium 3.7   Protein --   Sodium 142      Thyroid Function Tests:  07-22 @ 10:19 TSH -- FreeT4 -- T3 59 Anti TPO -- Anti Thyroglobulin Ab -- TSI --  07-22 @ 05:51 TSH 1.86 FreeT4 -- T3 -- Anti TPO -- Anti Thyroglobulin Ab -- TSI --      Diabetes Tests:       Radiology:

## 2017-07-23 NOTE — CONSULT NOTE ADULT - CONSULT REASON
Pneumonia  Sepsis  CVA  Acute Respiratory failure  Gi  Bleed   Anemia
Possible severe sepsis
Vent dependent patient, with sepsis/respiratory distress
hypoglycemia
vomiting

## 2017-07-24 ENCOUNTER — TRANSCRIPTION ENCOUNTER (OUTPATIENT)
Age: 72
End: 2017-07-24

## 2017-07-24 ENCOUNTER — RESULT REVIEW (OUTPATIENT)
Age: 72
End: 2017-07-24

## 2017-07-24 LAB
ANION GAP SERPL CALC-SCNC: 7 MMOL/L — SIGNIFICANT CHANGE UP (ref 5–17)
BASOPHILS # BLD AUTO: 0 K/UL — SIGNIFICANT CHANGE UP (ref 0–0.2)
BASOPHILS NFR BLD AUTO: 0.8 % — SIGNIFICANT CHANGE UP (ref 0–2)
BUN SERPL-MCNC: 6 MG/DL — LOW (ref 7–23)
CALCIUM SERPL-MCNC: 8.2 MG/DL — LOW (ref 8.5–10.1)
CHLORIDE SERPL-SCNC: 109 MMOL/L — HIGH (ref 96–108)
CK SERPL-CCNC: 264 U/L — HIGH (ref 26–192)
CO2 SERPL-SCNC: 25 MMOL/L — SIGNIFICANT CHANGE UP (ref 22–31)
CREAT SERPL-MCNC: 0.95 MG/DL — SIGNIFICANT CHANGE UP (ref 0.5–1.3)
EOSINOPHIL # BLD AUTO: 0.2 K/UL — SIGNIFICANT CHANGE UP (ref 0–0.5)
EOSINOPHIL NFR BLD AUTO: 3.2 % — SIGNIFICANT CHANGE UP (ref 0–6)
GLUCOSE SERPL-MCNC: 160 MG/DL — HIGH (ref 70–99)
HCT VFR BLD CALC: 38.5 % — SIGNIFICANT CHANGE UP (ref 34.5–45)
HGB BLD-MCNC: 12.5 G/DL — SIGNIFICANT CHANGE UP (ref 11.5–15.5)
LYMPHOCYTES # BLD AUTO: 1.4 K/UL — SIGNIFICANT CHANGE UP (ref 1–3.3)
LYMPHOCYTES # BLD AUTO: 24.9 % — SIGNIFICANT CHANGE UP (ref 13–44)
MAGNESIUM SERPL-MCNC: 2.4 MG/DL — SIGNIFICANT CHANGE UP (ref 1.6–2.6)
MCHC RBC-ENTMCNC: 27.8 PG — SIGNIFICANT CHANGE UP (ref 27–34)
MCHC RBC-ENTMCNC: 32.4 GM/DL — SIGNIFICANT CHANGE UP (ref 32–36)
MCV RBC AUTO: 85.6 FL — SIGNIFICANT CHANGE UP (ref 80–100)
MONOCYTES # BLD AUTO: 0.5 K/UL — SIGNIFICANT CHANGE UP (ref 0–0.9)
MONOCYTES NFR BLD AUTO: 9.4 % — HIGH (ref 1–9)
NEUTROPHILS # BLD AUTO: 3.5 K/UL — SIGNIFICANT CHANGE UP (ref 1.8–7.4)
NEUTROPHILS NFR BLD AUTO: 61.8 % — SIGNIFICANT CHANGE UP (ref 43–77)
PHOSPHATE SERPL-MCNC: 2 MG/DL — LOW (ref 2.5–4.5)
PLATELET # BLD AUTO: 199 K/UL — SIGNIFICANT CHANGE UP (ref 150–400)
POTASSIUM SERPL-MCNC: 3.7 MMOL/L — SIGNIFICANT CHANGE UP (ref 3.5–5.3)
POTASSIUM SERPL-SCNC: 3.7 MMOL/L — SIGNIFICANT CHANGE UP (ref 3.5–5.3)
RBC # BLD: 4.5 M/UL — SIGNIFICANT CHANGE UP (ref 3.8–5.2)
RBC # FLD: 13.8 % — SIGNIFICANT CHANGE UP (ref 10.3–14.5)
SODIUM SERPL-SCNC: 141 MMOL/L — SIGNIFICANT CHANGE UP (ref 135–145)
WBC # BLD: 5.7 K/UL — SIGNIFICANT CHANGE UP (ref 3.8–10.5)
WBC # FLD AUTO: 5.7 K/UL — SIGNIFICANT CHANGE UP (ref 3.8–10.5)

## 2017-07-24 PROCEDURE — 88312 SPECIAL STAINS GROUP 1: CPT | Mod: 26

## 2017-07-24 PROCEDURE — 99291 CRITICAL CARE FIRST HOUR: CPT

## 2017-07-24 PROCEDURE — 88305 TISSUE EXAM BY PATHOLOGIST: CPT | Mod: 26

## 2017-07-24 RX ORDER — DEXTROSE 50 % IN WATER 50 %
25 SYRINGE (ML) INTRAVENOUS ONCE
Qty: 0 | Refills: 0 | Status: DISCONTINUED | OUTPATIENT
Start: 2017-07-24 | End: 2017-07-26

## 2017-07-24 RX ORDER — PANTOPRAZOLE SODIUM 20 MG/1
40 TABLET, DELAYED RELEASE ORAL
Qty: 0 | Refills: 0 | Status: DISCONTINUED | OUTPATIENT
Start: 2017-07-24 | End: 2017-07-26

## 2017-07-24 RX ORDER — INSULIN HUMAN 100 [IU]/ML
INJECTION, SOLUTION SUBCUTANEOUS EVERY 6 HOURS
Qty: 0 | Refills: 0 | Status: DISCONTINUED | OUTPATIENT
Start: 2017-07-24 | End: 2017-07-26

## 2017-07-24 RX ORDER — POTASSIUM CHLORIDE 20 MEQ
40 PACKET (EA) ORAL ONCE
Qty: 0 | Refills: 0 | Status: COMPLETED | OUTPATIENT
Start: 2017-07-24 | End: 2017-07-24

## 2017-07-24 RX ORDER — DEXTROSE 50 % IN WATER 50 %
25 SYRINGE (ML) INTRAVENOUS ONCE
Qty: 0 | Refills: 0 | Status: DISCONTINUED | OUTPATIENT
Start: 2017-07-24 | End: 2017-07-24

## 2017-07-24 RX ORDER — POTASSIUM PHOSPHATE, MONOBASIC POTASSIUM PHOSPHATE, DIBASIC 236; 224 MG/ML; MG/ML
15 INJECTION, SOLUTION INTRAVENOUS ONCE
Qty: 0 | Refills: 0 | Status: COMPLETED | OUTPATIENT
Start: 2017-07-24 | End: 2017-07-24

## 2017-07-24 RX ORDER — DEXTROSE 50 % IN WATER 50 %
12.5 SYRINGE (ML) INTRAVENOUS ONCE
Qty: 0 | Refills: 0 | Status: DISCONTINUED | OUTPATIENT
Start: 2017-07-24 | End: 2017-07-26

## 2017-07-24 RX ORDER — SODIUM CHLORIDE 9 MG/ML
1000 INJECTION, SOLUTION INTRAVENOUS
Qty: 0 | Refills: 0 | Status: DISCONTINUED | OUTPATIENT
Start: 2017-07-24 | End: 2017-07-26

## 2017-07-24 RX ADMIN — PIPERACILLIN AND TAZOBACTAM 25 GRAM(S): 4; .5 INJECTION, POWDER, LYOPHILIZED, FOR SOLUTION INTRAVENOUS at 13:52

## 2017-07-24 RX ADMIN — SIMETHICONE 80 MILLIGRAM(S): 80 TABLET, CHEWABLE ORAL at 23:59

## 2017-07-24 RX ADMIN — Medication 300 MILLIGRAM(S): at 08:33

## 2017-07-24 RX ADMIN — Medication 40 MILLIEQUIVALENT(S): at 08:16

## 2017-07-24 RX ADMIN — SIMETHICONE 80 MILLIGRAM(S): 80 TABLET, CHEWABLE ORAL at 11:23

## 2017-07-24 RX ADMIN — Medication 1 DROP(S): at 05:25

## 2017-07-24 RX ADMIN — FENTANYL CITRATE 1 PATCH: 50 INJECTION INTRAVENOUS at 17:36

## 2017-07-24 RX ADMIN — PIPERACILLIN AND TAZOBACTAM 25 GRAM(S): 4; .5 INJECTION, POWDER, LYOPHILIZED, FOR SOLUTION INTRAVENOUS at 05:25

## 2017-07-24 RX ADMIN — SIMETHICONE 80 MILLIGRAM(S): 80 TABLET, CHEWABLE ORAL at 17:36

## 2017-07-24 RX ADMIN — PANTOPRAZOLE SODIUM 40 MILLIGRAM(S): 20 TABLET, DELAYED RELEASE ORAL at 17:36

## 2017-07-24 RX ADMIN — Medication 300 MILLIGRAM(S): at 19:52

## 2017-07-24 RX ADMIN — SIMETHICONE 80 MILLIGRAM(S): 80 TABLET, CHEWABLE ORAL at 05:25

## 2017-07-24 RX ADMIN — SENNA PLUS 2 TABLET(S): 8.6 TABLET ORAL at 21:07

## 2017-07-24 RX ADMIN — POLYETHYLENE GLYCOL 3350 17 GRAM(S): 17 POWDER, FOR SOLUTION ORAL at 11:23

## 2017-07-24 RX ADMIN — PANTOPRAZOLE SODIUM 40 MILLIGRAM(S): 20 TABLET, DELAYED RELEASE ORAL at 05:25

## 2017-07-24 RX ADMIN — POTASSIUM PHOSPHATE, MONOBASIC POTASSIUM PHOSPHATE, DIBASIC 63.75 MILLIMOLE(S): 236; 224 INJECTION, SOLUTION INTRAVENOUS at 08:16

## 2017-07-24 RX ADMIN — Medication 1 TABLET(S): at 13:52

## 2017-07-24 RX ADMIN — Medication 1 DROP(S): at 17:43

## 2017-07-24 RX ADMIN — Medication 1 TABLET(S): at 21:07

## 2017-07-24 RX ADMIN — INSULIN HUMAN 1: 100 INJECTION, SOLUTION SUBCUTANEOUS at 18:03

## 2017-07-24 RX ADMIN — Medication 1 TABLET(S): at 05:25

## 2017-07-24 RX ADMIN — Medication 25 MILLILITER(S): at 00:02

## 2017-07-24 RX ADMIN — PIPERACILLIN AND TAZOBACTAM 25 GRAM(S): 4; .5 INJECTION, POWDER, LYOPHILIZED, FOR SOLUTION INTRAVENOUS at 21:07

## 2017-07-24 RX ADMIN — FENTANYL CITRATE 1 PATCH: 50 INJECTION INTRAVENOUS at 17:37

## 2017-07-24 RX ADMIN — Medication 1 DROP(S): at 11:23

## 2017-07-24 NOTE — PROGRESS NOTE ADULT - SUBJECTIVE AND OBJECTIVE BOX
CAPILLARY BLOOD GLUCOSE  145 (24 Jul 2017 04:00)  129 (23 Jul 2017 23:00)  156 (23 Jul 2017 20:00)  147 (23 Jul 2017 15:53)  146 (23 Jul 2017 11:50)          Vital Signs Last 24 Hrs  T(C): 37.1 (24 Jul 2017 08:00), Max: 37.2 (23 Jul 2017 11:30)  T(F): 98.7 (24 Jul 2017 08:00), Max: 98.9 (23 Jul 2017 11:30)  HR: 60 (24 Jul 2017 07:00) (60 - 106)  BP: 109/58 (24 Jul 2017 07:00) (109/58 - 200/83)  BP(mean): 78 (24 Jul 2017 07:00) (78 - 123)  RR: 13 (24 Jul 2017 07:00) (11 - 32)  SpO2: 98% (24 Jul 2017 07:00) (96% - 100%)    GRespiratory: CTA B/L  CV: RRR, S1S2, no murmurs, rubs or gallops  Abdominal: Soft, NT, ND +BS, Last BM  Extremities: No edema, + peripheral pulses     07-24    141  |  109<H>  |  6<L>  ----------------------------<  160<H>  3.7   |  25  |  0.95    Ca    8.2<L>      24 Jul 2017 05:59  Phos  2.0     07-24  Mg     2.4     07-24        dextrose 50% Injectable 12.5 Gram(s) IV Push once  dextrose 50% Injectable 25 Gram(s) IV Push once  dextrose 50% Injectable 25 Gram(s) IV Push once

## 2017-07-24 NOTE — PROGRESS NOTE ADULT - SUBJECTIVE AND OBJECTIVE BOX
BREA BECKHAM is a 71yFemale , patient examined and chart reviewed. Patient being followed for possible aspiration pneumonia and colonic ileus and urinary retention     INTERVAL HPI/ OVERNIGHT EVENTS: No new events, appears comfortable on respirator. Afebrile, tolerating PEG feeds .       Past Medical History--  PAST MEDICAL & SURGICAL HISTORY:  Respiratory failure  Constipation  GERD (gastroesophageal reflux disease)  Hypertension  Respiratory failure  Diabetes mellitus  Aphasic stroke  Dementia of frontal lobe type  Tracheostomy in place  Gastrostomy in place  Hx of appendectomy      For details regarding the patient's social history, family history, and other miscellaneous elements, please refer the initial infectious diseases consultation and/or the admitting history and physical examination for this admission.      ROS:  Unable to obtain due to : Vegetative state , non verbal       Current inpatient medications :    ANTIBIOTICS/RELEVANT:  piperacillin/tazobactam IVPB. 3.375 Gram(s) IV Intermittent every 8 hours  lactobacillus acidophilus 1 Tablet(s) Oral every 8 hours  tobramycin for Nebulization 300 milliGRAM(s) Inhalation two times a day      dextrose 5%. 1000 milliLiter(s) IV Continuous <Continuous>  dextrose 50% Injectable 12.5 Gram(s) IV Push once  dextrose 50% Injectable 25 Gram(s) IV Push once  dextrose 50% Injectable 25 Gram(s) IV Push once  artificial  tears Solution 1 Drop(s) Both EYES four times a day  fentaNYL   Patch  12 MICROgram(s)/Hr 1 Patch Transdermal every 72 hours  polyethylene glycol 3350 17 Gram(s) Oral daily  sodium chloride 0.65% Nasal 1 Spray(s) Both Nostrils three times a day PRN  pantoprazole  Injectable 40 milliGRAM(s) IV Push every 12 hours  bisacodyl Suppository 10 milliGRAM(s) Rectal daily  ALBUTerol    90 MICROgram(s) HFA Inhaler 4 Puff(s) Inhalation every 6 hours PRN  simethicone 80 milliGRAM(s) Chew every 6 hours  senna 2 Tablet(s) Oral at bedtime  dextrose 10%. 500 milliLiter(s) IV Continuous <Continuous>  enoxaparin Injectable 40 milliGRAM(s) SubCutaneous daily      Objective:    07-23 @ 07:01  -  07-24 @ 07:00  --------------------------------------------------------  IN: 1865 mL / OUT: 1760 mL / NET: 105 mL    07-24 @ 07:01  -  07-24 @ 12:35  --------------------------------------------------------  IN: 475 mL / OUT: 635 mL / NET: -160 mL      T(C): 36.9 (07-24-17 @ 12:00), Max: 37.1 (07-23-17 @ 20:15)  HR: 83 (07-24-17 @ 12:00) (60 - 94)  BP: 167/88 (07-24-17 @ 12:00) (109/58 - 167/88)  RR: 19 (07-24-17 @ 12:00) (11 - 25)  SpO2: 100% (07-24-17 @ 12:00) (96% - 100%)  Wt(kg): --      Physical Exam:  GEN: NAD, on respirator   HEENT: normocephalic and atraumatic. EOMI. MARKIE. Moist mucosa. Clear Posterior pharynx.  NECK: Supple. No carotid bruits.  No lymphadenopathy or thyromegaly.  LUNGS: Coarse breath sounds on auscultation   HEART: Regular rate and rhythm without murmur.  ABDOMEN: Soft, nontender, and nondistended.  Positive bowel sounds.  No hepatosplenomegaly was noted.+ PEG  EXTREMITIES: Without any cyanosis, clubbing, rash, lesions or edema.  NEUROLOGIC: vegetative state.  SKIN: No ulceration or induration present.      LABS:                        12.5   5.7   )-----------( 199      ( 24 Jul 2017 05:59 )             38.5       07-24    141  |  109<H>  |  6<L>  ----------------------------<  160<H>  3.7   |  25  |  0.95    Ca    8.2<L>      24 Jul 2017 05:59  Phos  2.0     07-24  Mg     2.4     07-24    Lactate, Blood (07.21.17 @ 21:11)    Lactate, Blood: 1.9 mmol/L             RECENT CULTURES:  Culture - Sputum . (07.21.17 @ 21:39)    -  Aztreonam: S <=4    -  Ceftazidime: S 4    -  Ciprofloxacin: S <=0.5    -  Imipenem: S <=1    -  Levofloxacin: S <=1    -  Meropenem: S <=1    -  Tobramycin: S <=2    -  Amikacin: S 16    -  Cefepime: S 8    -  Gentamicin: I 8    -  Piperacillin/Tazobactam: S <=8    Gram Stain:   Numerous polymorphonuclear leukocytes per low power field  No Squamous epithelial cells per low power field  Few Gram Negative Rods per oil power field  Rare Gram positive cocci in pairs per oil power field    Specimen Source: .Sputum Sputum / TRAP    Culture Results:   Few Pseudomonas aeruginosa  Normal Respiratory Elvira present    Organism Identification: Pseudomonas aeruginosa    Organism: Pseudomonas aeruginosa    Method Type: PRATIK    Culture - Blood (07.21.17 @ 16:40)    -  Candida tropicalis: Nondet    -  Coagulase negative Staphylococcus: Detec    -  Enterobacter cloacae complex: Nondet    -  Enterococcus species: Nondet    -  Pseudomonas aeruginosa: Nondet    -  Streptococcus pyogenes (Group A): Nondet    -  Acinetobacter baumanii: Nondet    -  Candida parapsilosis: Nondet    -  Escherichia coli: Nondet    -  Haemophilus influenzae: Nondet    -  Serratia marcescens: Nondet    Gram Stain:   Growth in aerobic bottle: Gram Positive Cocci in Clusters    -  Candida albicans: Nondet    -  Candida glabrata: Nondet    -  Listeria monocytogenes: Nondet    -  Multidrug (KPC pos) resistant organism: Nondet    -  Neisseria meningitidis: Nondet    -  Proteus species: Nondet    -  Streptococcus agalactiae (Group B): Nondet    -  Streptococcus sp. (Not Grp A, B or S pneumoniae): Nondet    -  Candida krusei: Nondet    -  Klebsiella oxytoca: Nondet    -  Klebsiella pneumoniae: Nondet    -  Methicillin resistant Staphylococcus aureus (MRSA): Nondet    -  Staphylococcus aureus: Nondet    -  Streptococcus pneumoniae: Nondet    -  Vancomycin resistant Enterococcus sp.: Nondet    Specimen Source: .Blood Blood-Peripheral    Organism: Blood Culture PCR    Culture Results:   Growth in aerobic bottle: Coag Negative Staphylococcus  Single set isolate, possible contaminant. Contact  Microbiology if susceptibility testing clinically  indicated.  ***Blood Panel PCR results on this specimen are available  approximately 3 hours after the Gram stain result.***  Gram stain, PCR, and/or culture results may not always  correspond due to difference in methodologies.    Organism Identification: Blood Culture PCR    Method Type: PCR    Culture - Urine (07.21.17 @ 16:31)    Specimen Source: .Urine Catheterized    Culture Results:   Culture grew 3 or more types of organisms which indicate  collection contamination; consider recollection only if clinically  indicated.      RADIOLOGY & ADDITIONAL STUDIES:  CT Chest No Cont (07.21.17 @ 18:22) >  Impression:    Limited study.    Patchy bibasilar peribronchial airspace opacities as discussed.     Gastric wall thickening, correlate for gastritis.  No bowel obstruction noted.    Other findings as discussed above.        Assessment :    Assessment :    71 year old female admitted with respiratory distress and possible GIB/sepsis with a history of tracheostomy, gastrostomy, s/p CVA who lives in a nursing home admitted with respiratory distress and hypotension. Had hypoglycemia . She remains at high risk for aspiration . She has HCAP with pseudomonas , probably aspiration secondary to episodes of vomiting  .    She has coag negative staph bacteremia likely to be skin contaminant     Plan:   - continue with IV zosyn for now , can be changed to Cipro and flagyl vis PEG if stays afebrile . Needs total 7-10 days treatment   - ? need for EGD for gastritis   - trend CBC   - DC planning     Continue with other supportive care     I have discussed the above plan of care with patient's  family in detail. They expressed understanding of the the treatment plan . Risks, benefits and alternatives discussed in detail. I have asked if they have any questions or concerns and appropriately addressed them to the best of my ability .      Critical care time greater then 35 minutes reviewing notes, labs data/ imaging , discussion with multidisciplinary team.    Thank you for allowing me to participate in care of your patient .        Alejandrina Brantley MD  [phone]

## 2017-07-24 NOTE — PROGRESS NOTE ADULT - SUBJECTIVE AND OBJECTIVE BOX
PULMONARY/CRITICAL CARE      INTERVAL HPI/OVERNIGHT EVENTS: Pt tolerated trach collar. No sob. Poorly responsive. No fever.    71y FemaleHPI:  The patient is a 71 year old female with a history of dementia, tracheostomy, and gastrostomy, s/p aphasic stroke who is vent dependent and a resident at a nursing home. Per  over the past two weeks the patient has had progressive difficulty with PEG tube feeding. The patient has vomited several times over the last two weeks and had drainage of dark fluid from the PEG tube described as coffee grounds. The stomach was decompressed earlier this week at the nursing home with a vaughn catheter via the PEG tube. The last episode of vomiting was last night. The patient began to have progressive difficulty of breath earlier today, which led the use of AMBU bag for ventilation and subsequent transfer to the ED (21 Jul 2017 13:51)        PAST MEDICAL & SURGICAL HISTORY:  Respiratory failure  Constipation  GERD (gastroesophageal reflux disease)  Hypertension  Respiratory failure  Diabetes mellitus  Aphasic stroke  Dementia of frontal lobe type  Tracheostomy in place  Gastrostomy in place  Hx of appendectomy        ICU Vital Signs Last 24 Hrs  T(C): 37.1 (24 Jul 2017 08:00), Max: 37.2 (23 Jul 2017 11:30)  T(F): 98.7 (24 Jul 2017 08:00), Max: 98.9 (23 Jul 2017 11:30)  HR: 73 (24 Jul 2017 08:00) (60 - 106)  BP: 139/76 (24 Jul 2017 08:00) (109/58 - 200/83)  BP(mean): 101 (24 Jul 2017 08:00) (78 - 123)  ABP: --  ABP(mean): --  RR: 20 (24 Jul 2017 08:00) (11 - 32)  SpO2: 100% (24 Jul 2017 08:00) (96% - 100%)    Qtts:     I&O's Summary    23 Jul 2017 07:01  -  24 Jul 2017 07:00  --------------------------------------------------------  IN: 1865 mL / OUT: 1760 mL / NET: 105 mL    24 Jul 2017 07:01  -  24 Jul 2017 08:32  --------------------------------------------------------  IN: 375 mL / OUT: 60 mL / NET: 315 mL            REVIEW OF SYSTEMS:    CONSTITUTIONAL: No fever, weight loss, or fatigue  EYES: No eye pain, visual disturbances, or discharge  ENMT:  No difficulty hearing, tinnitus, vertigo; No sinus or throat pain  NECK: No pain or stiffness  BREASTS: No pain, masses, or nipple discharge  RESPIRATORY: No cough, wheezing, chills or hemoptysis; No shortness of breath : trach intact.  CARDIOVASCULAR: No chest pain, palpitations, dizziness, or leg swelling  GASTROINTESTINAL: Peg in place.No abdominal or epigastric pain. No nausea, vomiting, or hematemesis; No diarrhea or constipation. No melena or hematochezia.  GENITOURINARY: No dysuria, frequency, hematuria, or incontinence  NEUROLOGICAL:unresponsive to all stimuli.  SKIN: No itching, burning, rashes, or lesions   LYMPH NODES: No enlarged glands  ENDOCRINE: No heat or cold intolerance; No hair loss  MUSCULOSKELETAL: No joint pain or swelling; No muscle, back, or extremity pain, no calf tenderness  PSYCHIATRIC: unresponsive  HEME/LYMPH: No easy bruising, or bleeding gums  ALLERGY AND IMMUNOLOGIC: No hives or eczema      PHYSICAL EXAM:    GENERAL: NAD, well-groomed, well-developed, NAD  HEAD:  Atraumatic, Normocephalic  EYES: EOMI, PERRLA, conjunctiva and sclera clear  ENMT: No tonsillar erythema, exudates, or enlargement; Moist mucous membranes, Good dentition, No lesions  NECK: Supple, No JVD, Normal thyroid: trach in place with trach collar  NERVOUS SYSTEM: Unresponsive to all stimuli. Arms and legs contracted.  CHEST/LUNG: Clear to percussion bilaterally; No rales, rhonchi, wheezing, or rubs  HEART: Regular rate and rhythm; No murmurs, rubs, or gallops  ABDOMEN: Soft, Nontender, Nondistended; Bowel sounds present  Peg intact.  EXTREMITIES:  2+ Peripheral Pulses, No clubbing, cyanosis, or edema  LYMPH: No lymphadenopathy noted  SKIN: No rashes or lesions        LABS:                        12.5   5.7   )-----------( 199      ( 24 Jul 2017 05:59 )             38.5     07-24    141  |  109<H>  |  6<L>  ----------------------------<  160<H>  3.7   |  25  |  0.95    Ca    8.2<L>      24 Jul 2017 05:59  Phos  2.0     07-24  Mg     2.4     07-24              RADIOLOGY & ADDITIONAL STUDIES:       CRITICAL CARE TIME SPENT:

## 2017-07-24 NOTE — PROGRESS NOTE ADULT - SUBJECTIVE AND OBJECTIVE BOX
Patient is a 71y old  Female who presents with a chief complaint of resp distress (21 Jul 2017 13:51)      INTERVAL HPI/OVERNIGHT EVENTS: on trach collar today, for egd at bedside    MEDICATIONS  (STANDING):  piperacillin/tazobactam IVPB. 3.375 Gram(s) IV Intermittent every 8 hours  lactobacillus acidophilus 1 Tablet(s) Oral every 8 hours  dextrose 5%. 1000 milliLiter(s) (50 mL/Hr) IV Continuous <Continuous>  dextrose 50% Injectable 12.5 Gram(s) IV Push once  dextrose 50% Injectable 25 Gram(s) IV Push once  dextrose 50% Injectable 25 Gram(s) IV Push once  artificial  tears Solution 1 Drop(s) Both EYES four times a day  fentaNYL   Patch  12 MICROgram(s)/Hr 1 Patch Transdermal every 72 hours  polyethylene glycol 3350 17 Gram(s) Oral daily  bisacodyl Suppository 10 milliGRAM(s) Rectal daily  simethicone 80 milliGRAM(s) Chew every 6 hours  senna 2 Tablet(s) Oral at bedtime  dextrose 10%. 500 milliLiter(s) (25 mL/Hr) IV Continuous <Continuous>  enoxaparin Injectable 40 milliGRAM(s) SubCutaneous daily  tobramycin for Nebulization 300 milliGRAM(s) Inhalation two times a day  pantoprazole    Tablet 40 milliGRAM(s) Oral two times a day before meals    MEDICATIONS  (PRN):  sodium chloride 0.65% Nasal 1 Spray(s) Both Nostrils three times a day PRN Nasal Congestion  ALBUTerol    90 MICROgram(s) HFA Inhaler 4 Puff(s) Inhalation every 6 hours PRN Shortness of Breath and/or Wheezing      Allergies    codeine (Hives)    Intolerances        REVIEW OF SYSTEMS:  CONSTITUTIONAL: No fever, weight loss, or fatigue  EYES: No eye pain, visual disturbances  ENMT:  No difficulty hearing, tinnitus, vertigo; No sinus or throat pain  NECK: No pain or stiffness  RESPIRATORY: No cough, wheezing, chills or hemoptysis; No shortness of breath  CARDIOVASCULAR: No chest pain, palpitations, dizziness  GASTROINTESTINAL: No abdominal or epigastric pain. No nausea, vomiting, or hematemesis; No diarrhea or constipation. No melena or hematochezia.  GENITOURINARY: No dysuria, frequency, hematuria, or incontinence  NEUROLOGICAL: No headaches, memory loss, loss of strength, numbness, or tremors  SKIN: No itching, burning  LYMPH NODES: No enlarged glands  MUSCULOSKELETAL: No joint pain or swelling; No muscle, back, or extremity pain  PSYCHIATRIC: No depression, mood swings  HEME/LYMPH: No easy bruising, or bleeding gums  ALLERGY AND IMMUNOLOGIC: No hives    Vital Signs Last 24 Hrs  T(C): 36.9 (24 Jul 2017 12:00), Max: 37.1 (23 Jul 2017 20:15)  T(F): 98.4 (24 Jul 2017 12:00), Max: 98.8 (24 Jul 2017 00:00)  HR: 68 (24 Jul 2017 14:11) (60 - 94)  BP: 148/65 (24 Jul 2017 14:11) (109/58 - 217/99)  BP(mean): 93 (24 Jul 2017 14:11) (78 - 137)  RR: 19 (24 Jul 2017 14:11) (11 - 33)  SpO2: 100% (24 Jul 2017 14:11) (96% - 100%)    PHYSICAL EXAM:  GENERAL: NAD, well-groomed, well-developed  HEAD:  Atraumatic, Normocephalic  EYES: EOMI, PERRLA, conjunctiva and sclera clear  ENMT: No tonsillar erythema, exudates, or enlargement   NECK: trach in place  NERVOUS SYSTEM:  Alert & awake  CHEST/LUNG: Clear to auscultation bilaterally; No rales, rhonchi, wheezing  HEART: Regular rate and rhythm  ABDOMEN: Soft, Nontender, Nondistended; Bowel sounds present  EXTREMITIES:  2+ Peripheral Pulses   LYMPH: No lymphadenopathy noted  SKIN: No rashes     LABS:                        12.5   5.7   )-----------( 199      ( 24 Jul 2017 05:59 )             38.5     24 Jul 2017 05:59    141    |  109    |  6      ----------------------------<  160    3.7     |  25     |  0.95     Ca    8.2        24 Jul 2017 05:59  Phos  2.0       24 Jul 2017 05:59  Mg     2.4       24 Jul 2017 05:59          CAPILLARY BLOOD GLUCOSE  156 (24 Jul 2017 11:32)  179 (24 Jul 2017 08:00)  145 (24 Jul 2017 04:00)  129 (23 Jul 2017 23:00)  156 (23 Jul 2017 20:00)  147 (23 Jul 2017 15:53)                RADIOLOGY & ADDITIONAL TESTS:  no new      Consultant(s) Notes Reviewed:  [x ] YES  [ ] NO    Care Discussed with Consultants/Other Providers [x ] YES  [ ] NO    Advanced Care Planning Discussed with Patien/Family [ ] YES  [x ] NO

## 2017-07-25 DIAGNOSIS — E16.2 HYPOGLYCEMIA, UNSPECIFIED: ICD-10-CM

## 2017-07-25 LAB
ANION GAP SERPL CALC-SCNC: 5 MMOL/L — SIGNIFICANT CHANGE UP (ref 5–17)
BASOPHILS # BLD AUTO: 0 K/UL — SIGNIFICANT CHANGE UP (ref 0–0.2)
BASOPHILS NFR BLD AUTO: 0.7 % — SIGNIFICANT CHANGE UP (ref 0–2)
BUN SERPL-MCNC: 6 MG/DL — LOW (ref 7–23)
CALCIUM SERPL-MCNC: 8.4 MG/DL — LOW (ref 8.5–10.1)
CHLORIDE SERPL-SCNC: 109 MMOL/L — HIGH (ref 96–108)
CO2 SERPL-SCNC: 26 MMOL/L — SIGNIFICANT CHANGE UP (ref 22–31)
CREAT SERPL-MCNC: 1 MG/DL — SIGNIFICANT CHANGE UP (ref 0.5–1.3)
EOSINOPHIL # BLD AUTO: 0.3 K/UL — SIGNIFICANT CHANGE UP (ref 0–0.5)
EOSINOPHIL NFR BLD AUTO: 4.1 % — SIGNIFICANT CHANGE UP (ref 0–6)
GLUCOSE SERPL-MCNC: 144 MG/DL — HIGH (ref 70–99)
HBA1C BLD-MCNC: 6.7 % — HIGH (ref 4–5.6)
HCT VFR BLD CALC: 38.7 % — SIGNIFICANT CHANGE UP (ref 34.5–45)
HGB BLD-MCNC: 12.6 G/DL — SIGNIFICANT CHANGE UP (ref 11.5–15.5)
LYMPHOCYTES # BLD AUTO: 2.2 K/UL — SIGNIFICANT CHANGE UP (ref 1–3.3)
LYMPHOCYTES # BLD AUTO: 32.5 % — SIGNIFICANT CHANGE UP (ref 13–44)
MAGNESIUM SERPL-MCNC: 2.4 MG/DL — SIGNIFICANT CHANGE UP (ref 1.6–2.6)
MCHC RBC-ENTMCNC: 27.9 PG — SIGNIFICANT CHANGE UP (ref 27–34)
MCHC RBC-ENTMCNC: 32.4 GM/DL — SIGNIFICANT CHANGE UP (ref 32–36)
MCV RBC AUTO: 86 FL — SIGNIFICANT CHANGE UP (ref 80–100)
MONOCYTES # BLD AUTO: 0.7 K/UL — SIGNIFICANT CHANGE UP (ref 0–0.9)
MONOCYTES NFR BLD AUTO: 10.1 % — HIGH (ref 1–9)
NEUTROPHILS # BLD AUTO: 3.6 K/UL — SIGNIFICANT CHANGE UP (ref 1.8–7.4)
NEUTROPHILS NFR BLD AUTO: 52.6 % — SIGNIFICANT CHANGE UP (ref 43–77)
PHOSPHATE SERPL-MCNC: 2.5 MG/DL — SIGNIFICANT CHANGE UP (ref 2.5–4.5)
PLATELET # BLD AUTO: 212 K/UL — SIGNIFICANT CHANGE UP (ref 150–400)
POTASSIUM SERPL-MCNC: 4.1 MMOL/L — SIGNIFICANT CHANGE UP (ref 3.5–5.3)
POTASSIUM SERPL-SCNC: 4.1 MMOL/L — SIGNIFICANT CHANGE UP (ref 3.5–5.3)
RBC # BLD: 4.5 M/UL — SIGNIFICANT CHANGE UP (ref 3.8–5.2)
RBC # FLD: 14 % — SIGNIFICANT CHANGE UP (ref 10.3–14.5)
SODIUM SERPL-SCNC: 140 MMOL/L — SIGNIFICANT CHANGE UP (ref 135–145)
WBC # BLD: 6.9 K/UL — SIGNIFICANT CHANGE UP (ref 3.8–10.5)
WBC # FLD AUTO: 6.9 K/UL — SIGNIFICANT CHANGE UP (ref 3.8–10.5)

## 2017-07-25 PROCEDURE — 99233 SBSQ HOSP IP/OBS HIGH 50: CPT | Mod: GC

## 2017-07-25 RX ORDER — METRONIDAZOLE 500 MG
500 TABLET ORAL EVERY 8 HOURS
Qty: 0 | Refills: 0 | Status: DISCONTINUED | OUTPATIENT
Start: 2017-07-26 | End: 2017-07-26

## 2017-07-25 RX ORDER — CIPROFLOXACIN LACTATE 400MG/40ML
500 VIAL (ML) INTRAVENOUS EVERY 12 HOURS
Qty: 0 | Refills: 0 | Status: DISCONTINUED | OUTPATIENT
Start: 2017-07-26 | End: 2017-07-26

## 2017-07-25 RX ORDER — AMLODIPINE BESYLATE 2.5 MG/1
2.5 TABLET ORAL
Qty: 0 | Refills: 0 | Status: DISCONTINUED | OUTPATIENT
Start: 2017-07-25 | End: 2017-07-26

## 2017-07-25 RX ADMIN — SIMETHICONE 80 MILLIGRAM(S): 80 TABLET, CHEWABLE ORAL at 12:35

## 2017-07-25 RX ADMIN — PIPERACILLIN AND TAZOBACTAM 25 GRAM(S): 4; .5 INJECTION, POWDER, LYOPHILIZED, FOR SOLUTION INTRAVENOUS at 22:17

## 2017-07-25 RX ADMIN — Medication 300 MILLIGRAM(S): at 19:21

## 2017-07-25 RX ADMIN — Medication 1 DROP(S): at 00:01

## 2017-07-25 RX ADMIN — PIPERACILLIN AND TAZOBACTAM 25 GRAM(S): 4; .5 INJECTION, POWDER, LYOPHILIZED, FOR SOLUTION INTRAVENOUS at 14:26

## 2017-07-25 RX ADMIN — Medication 1 TABLET(S): at 22:20

## 2017-07-25 RX ADMIN — PIPERACILLIN AND TAZOBACTAM 25 GRAM(S): 4; .5 INJECTION, POWDER, LYOPHILIZED, FOR SOLUTION INTRAVENOUS at 05:37

## 2017-07-25 RX ADMIN — SENNA PLUS 2 TABLET(S): 8.6 TABLET ORAL at 22:17

## 2017-07-25 RX ADMIN — ENOXAPARIN SODIUM 40 MILLIGRAM(S): 100 INJECTION SUBCUTANEOUS at 12:35

## 2017-07-25 RX ADMIN — Medication 1 TABLET(S): at 05:38

## 2017-07-25 RX ADMIN — SIMETHICONE 80 MILLIGRAM(S): 80 TABLET, CHEWABLE ORAL at 18:10

## 2017-07-25 RX ADMIN — Medication 1 DROP(S): at 05:38

## 2017-07-25 RX ADMIN — Medication 1 DROP(S): at 12:35

## 2017-07-25 RX ADMIN — Medication 1 DROP(S): at 18:09

## 2017-07-25 RX ADMIN — Medication 1 DROP(S): at 23:45

## 2017-07-25 RX ADMIN — Medication 300 MILLIGRAM(S): at 08:11

## 2017-07-25 RX ADMIN — POLYETHYLENE GLYCOL 3350 17 GRAM(S): 17 POWDER, FOR SOLUTION ORAL at 12:36

## 2017-07-25 RX ADMIN — AMLODIPINE BESYLATE 2.5 MILLIGRAM(S): 2.5 TABLET ORAL at 18:10

## 2017-07-25 RX ADMIN — Medication 1 TABLET(S): at 14:26

## 2017-07-25 RX ADMIN — SIMETHICONE 80 MILLIGRAM(S): 80 TABLET, CHEWABLE ORAL at 23:45

## 2017-07-25 RX ADMIN — PANTOPRAZOLE SODIUM 40 MILLIGRAM(S): 20 TABLET, DELAYED RELEASE ORAL at 16:05

## 2017-07-25 RX ADMIN — Medication 10 MILLIGRAM(S): at 12:35

## 2017-07-25 RX ADMIN — SIMETHICONE 80 MILLIGRAM(S): 80 TABLET, CHEWABLE ORAL at 06:17

## 2017-07-25 RX ADMIN — PANTOPRAZOLE SODIUM 40 MILLIGRAM(S): 20 TABLET, DELAYED RELEASE ORAL at 05:38

## 2017-07-25 NOTE — PROGRESS NOTE ADULT - SUBJECTIVE AND OBJECTIVE BOX
Patient is a 71y old  Female who presents with a chief complaint of resp distress (21 Jul 2017 13:51)      INTERVAL /OVERNIGHT EVENTS: off vent now, on trach collar    MEDICATIONS  (STANDING):  piperacillin/tazobactam IVPB. 3.375 Gram(s) IV Intermittent every 8 hours  lactobacillus acidophilus 1 Tablet(s) Oral every 8 hours  dextrose 50% Injectable 12.5 Gram(s) IV Push once  dextrose 50% Injectable 25 Gram(s) IV Push once  dextrose 50% Injectable 25 Gram(s) IV Push once  artificial  tears Solution 1 Drop(s) Both EYES four times a day  fentaNYL   Patch  12 MICROgram(s)/Hr 1 Patch Transdermal every 72 hours  polyethylene glycol 3350 17 Gram(s) Oral daily  bisacodyl Suppository 10 milliGRAM(s) Rectal daily  simethicone 80 milliGRAM(s) Chew every 6 hours  senna 2 Tablet(s) Oral at bedtime  enoxaparin Injectable 40 milliGRAM(s) SubCutaneous daily  tobramycin for Nebulization 300 milliGRAM(s) Inhalation two times a day  pantoprazole    Tablet 40 milliGRAM(s) Oral two times a day before meals  insulin regular  human corrective regimen sliding scale   SubCutaneous every 6 hours  dextrose 5%. 1000 milliLiter(s) (50 mL/Hr) IV Continuous <Continuous>  dextrose 50% Injectable 12.5 Gram(s) IV Push once  dextrose 50% Injectable 25 Gram(s) IV Push once  amLODIPine   Tablet 2.5 milliGRAM(s) Oral two times a day    MEDICATIONS  (PRN):  sodium chloride 0.65% Nasal 1 Spray(s) Both Nostrils three times a day PRN Nasal Congestion  ALBUTerol    90 MICROgram(s) HFA Inhaler 4 Puff(s) Inhalation every 6 hours PRN Shortness of Breath and/or Wheezing      Allergies    codeine (Hives)    Intolerances        REVIEW OF SYSTEMS: patient is non verbal    Vital Signs Last 24 Hrs  T(C): 36.9 (25 Jul 2017 15:33), Max: 37.4 (25 Jul 2017 08:01)  T(F): 98.4 (25 Jul 2017 15:33), Max: 99.3 (25 Jul 2017 08:01)  HR: 60 (25 Jul 2017 16:00) (59 - 74)  BP: 161/67 (25 Jul 2017 16:00) (120/59 - 184/72)  BP(mean): 96 (25 Jul 2017 16:00) (85 - 109)  RR: 14 (25 Jul 2017 16:00) (11 - 22)  SpO2: 100% (25 Jul 2017 16:00) (97% - 100%)    PHYSICAL EXAM:  GENERAL: NAD, well-groomed, well-developed  HEAD:  Atraumatic, Normocephalic  EYES: EOMI, PERRLA, conjunctiva and sclera clear  ENMT: No tonsillar erythema, exudates, or enlargement; Moist mucous membranes, Good dentition, No lesions  NECK: Supple, No JVD, Normal thyroid  NERVOUS SYSTEM:  non responsive  CHEST/LUNG: Clear to auscultation bilaterally; No rales, rhonchi, wheezing, or rubs  HEART: Regular rate and rhythm; No murmurs, rubs, or gallops  ABDOMEN: Soft, Nontender, Nondistended; Bowel sounds present  EXTREMITIES:  2+ Peripheral Pulses, No clubbing, cyanosis, + edema  LYMPH: No lymphadenopathy noted  SKIN: No rashes or lesions    LABS:                        12.6   6.9   )-----------( 212      ( 25 Jul 2017 05:49 )             38.7     25 Jul 2017 05:49    140    |  109    |  6      ----------------------------<  144    4.1     |  26     |  1.00     Ca    8.4        25 Jul 2017 05:49  Phos  2.5       25 Jul 2017 05:49  Mg     2.4       25 Jul 2017 05:49          CAPILLARY BLOOD GLUCOSE  137 (25 Jul 2017 05:36)  120 (25 Jul 2017 00:00)  159 (24 Jul 2017 17:24)          RADIOLOGY & ADDITIONAL TESTS: < from: CT Chest No Cont (07.21.17 @ 18:22) >  EXAM:  CT CHEST                          EXAM:  CT ABDOMEN AND PELVIS OC                            PROCEDURE DATE:  07/21/2017          INTERPRETATION:  CT CHEST/ABDOMEN/PELVIS:     CLINICAL INFORMATION:  Sepsis, respiratory distress and vomiting.    COMPARISON: CT scan abdomen pelvis 2/19/2014.    PROCEDURE:  Using multislice helical CT, 2.5 mm sections were obtained   from the thoracic inlet through the ischial tuberosities.    Multiplanar reformatted images.     Image quality is degraded bypatient's arms and patient is rotated.    Tracheostomy tube is present. There is patchy bibasilar peribronchial   airspace opacities, which could reflect bronchopneumonia or chronic   aspiration pneumonia in the appropriate clinical setting. There are   atelectatic changes at both lung bases.  There is a trace left-sided pleural effusion.    There is an air-filled mildly dilated proximal esophagus.    There are small subcentimeter pretracheal mediastinal lymph nodes.  Evaluation the pulmonary hilum is limited without intravenous contrast.    There is a small pericardial effusion/pericardial thickening. Coronary   artery calcification is present.    The evaluation of the solid organ parenchyma is limited with out   intravenous contrast and streak artifact.    The liver, spleen and gallbladder appear grossly unremarkable.    < end of copied text >      Notes Reviewed:  [x ] YES  [ ] NO    Care Discussed with Consultants/Other Providers [x ] YES  [ ] NO

## 2017-07-25 NOTE — PROGRESS NOTE ADULT - SUBJECTIVE AND OBJECTIVE BOX
DEPARTMENT OF ANESTHESIA  POST ANESTHETIC EVALUATION    The Patient was interviewed and evaluated    Vital Signs Last 24 Hrs  T(C): 37.4 (25 Jul 2017 08:01), Max: 37.4 (25 Jul 2017 08:01)  T(F): 99.3 (25 Jul 2017 08:01), Max: 99.3 (25 Jul 2017 08:01)  HR: 63 (25 Jul 2017 09:11) (59 - 94)  BP: 160/72 (25 Jul 2017 09:05) (93/52 - 217/99)  BP(mean): 103 (25 Jul 2017 09:05) (67 - 137)  RR: 17 (25 Jul 2017 09:05) (11 - 33)  SpO2: 100% (25 Jul 2017 09:11) (97% - 100%)    Evaluation:      (x ) No apparent complications or complaints regarding anesthesia care at this time  (x ) All questions were answered    Condition:  (x) Stable      ( ) Guarded      ( ) Critical    Recommendations:  (x ) None     ( ) Other:

## 2017-07-25 NOTE — PROGRESS NOTE ADULT - SUBJECTIVE AND OBJECTIVE BOX
PULMONARY/CRITICAL CARE      INTERVAL HPI/OVERNIGHT EVENTS: Tolerating trach collar. No sob. No fever    71y FemaleHPI:  The patient is a 71 year old female with a history of dementia, tracheostomy, and gastrostomy, s/p aphasic stroke who is vent dependent and a resident at a nursing home. Per  over the past two weeks the patient has had progressive difficulty with PEG tube feeding. The patient has vomited several times over the last two weeks and had drainage of dark fluid from the PEG tube described as coffee grounds. The stomach was decompressed earlier this week at the nursing home with a vaughn catheter via the PEG tube. The last episode of vomiting was last night. The patient began to have progressive difficulty of breath earlier today, which led the use of AMBU bag for ventilation and subsequent transfer to the ED (21 Jul 2017 13:51)        PAST MEDICAL & SURGICAL HISTORY:  Respiratory failure  Constipation  GERD (gastroesophageal reflux disease)  Hypertension  Respiratory failure  Diabetes mellitus  Aphasic stroke  Dementia of frontal lobe type  Tracheostomy in place  Gastrostomy in place  Hx of appendectomy        ICU Vital Signs Last 24 Hrs  T(C): 37.4 (25 Jul 2017 08:01), Max: 37.4 (25 Jul 2017 08:01)  T(F): 99.3 (25 Jul 2017 08:01), Max: 99.3 (25 Jul 2017 08:01)  HR: 62 (25 Jul 2017 08:16) (59 - 94)  BP: 131/62 (25 Jul 2017 07:01) (93/52 - 217/99)  BP(mean): 89 (25 Jul 2017 07:01) (67 - 137)  ABP: --  ABP(mean): --  RR: 13 (25 Jul 2017 07:01) (11 - 33)  SpO2: 100% (25 Jul 2017 08:16) (96% - 100%)    Qtts:     I&O's Summary    24 Jul 2017 07:01  -  25 Jul 2017 07:00  --------------------------------------------------------  IN: 1620 mL / OUT: 2000 mL / NET: -380 mL        REVIEW OF SYSTEMS:    CONSTITUTIONAL: No fever, weight loss, or fatigue  EYES: No eye pain, visual disturbances, or discharge  ENMT:  No difficulty hearing, tinnitus, vertigo; No sinus or throat pain  NECK: No pain or stiffness  BREASTS: No pain, masses, or nipple discharge  RESPIRATORY: No cough, wheezing, chills or hemoptysis; No shortness of breath : trach intact.  CARDIOVASCULAR: No chest pain, palpitations, dizziness, or leg swelling  GASTROINTESTINAL: Peg in place.No abdominal or epigastric pain. No nausea, vomiting, or hematemesis; No diarrhea or constipation. No melena or hematochezia.  GENITOURINARY: No dysuria, frequency, hematuria, or incontinence  NEUROLOGICAL:unresponsive to all stimuli. Opens eyes.  SKIN: No itching, burning, rashes, or lesions   LYMPH NODES: No enlarged glands  ENDOCRINE: No heat or cold intolerance; No hair loss  MUSCULOSKELETAL: No joint pain or swelling; No muscle, back, or extremity pain, no calf tenderness  PSYCHIATRIC: unresponsive  HEME/LYMPH: No easy bruising, or bleeding gums  ALLERGY AND IMMUNOLOGIC: No hives or eczema      PHYSICAL EXAM:    GENERAL: NAD, well-groomed, well-developed, NAD  HEAD:  Atraumatic, Normocephalic  EYES: EOMI, PERRLA, conjunctiva and sclera clear  ENMT: No tonsillar erythema, exudates, or enlargement; Moist mucous membranes, Good dentition, No lesions  NECK: Supple, No JVD, Normal thyroid: trach in place with trach collar  NERVOUS SYSTEM: Unresponsive to all stimuli. Arms and legs contracted.  CHEST/LUNG: Clear to percussion bilaterally; No rales, rhonchi, wheezing, or rubs  HEART: Regular rate and rhythm; No murmurs, rubs, or gallops  ABDOMEN: Soft, Nontender, Nondistended; Bowel sounds present  Peg intact.  EXTREMITIES:  2+ Peripheral Pulses, No clubbing, cyanosis, or edema  LYMPH: No lymphadenopathy noted  SKIN: No rashes or lesions  LABS:                        12.6   6.9   )-----------( 212      ( 25 Jul 2017 05:49 )             38.7     07-25    140  |  109<H>  |  6<L>  ----------------------------<  144<H>  4.1   |  26  |  1.00    Ca    8.4<L>      25 Jul 2017 05:49  Phos  2.5     07-25  Mg     2.4     07-25          Sputum--pseudomonas    RADIOLOGY & ADDITIONAL STUDIES:      CRITICAL CARE TIME SPENT:

## 2017-07-25 NOTE — PROGRESS NOTE ADULT - SUBJECTIVE AND OBJECTIVE BOX
CAPILLARY BLOOD GLUCOSE  137 (25 Jul 2017 05:36)  120 (25 Jul 2017 00:00)  159 (24 Jul 2017 17:24)  156 (24 Jul 2017 11:32)  179 (24 Jul 2017 08:00)          Vital Signs Last 24 Hrs  T(C): 36.8 (25 Jul 2017 04:01), Max: 37.2 (24 Jul 2017 16:09)  T(F): 98.2 (25 Jul 2017 04:01), Max: 98.9 (24 Jul 2017 16:09)  HR: 72 (25 Jul 2017 06:00) (59 - 94)  BP: 144/65 (25 Jul 2017 06:00) (93/52 - 217/99)  BP(mean): 94 (25 Jul 2017 06:00) (67 - 137)  RR: 17 (25 Jul 2017 06:00) (11 - 33)  SpO2: 97% (25 Jul 2017 06:00) (96% - 100%)    pos trach  Respiratory: CTA B/L  CV: RRR, S1S2, no murmurs, rubs or gallops  Abdominal: Soft, NT, ND +BS, Last BM  Extremities: No edema, + peripheral pulses     07-25    140  |  109<H>  |  6<L>  ----------------------------<  144<H>  4.1   |  26  |  1.00    Ca    8.4<L>      25 Jul 2017 05:49  Phos  2.5     07-25  Mg     2.4     07-25        dextrose 50% Injectable 12.5 Gram(s) IV Push once  dextrose 50% Injectable 25 Gram(s) IV Push once  dextrose 50% Injectable 25 Gram(s) IV Push once  insulin regular  human corrective regimen sliding scale   SubCutaneous every 6 hours  dextrose 50% Injectable 12.5 Gram(s) IV Push once  dextrose 50% Injectable 25 Gram(s) IV Push once

## 2017-07-25 NOTE — PROGRESS NOTE ADULT - SUBJECTIVE AND OBJECTIVE BOX
BREA BECKHAM is a 71yFemale , patient examined and chart reviewed. Patient being followed for possible aspiration pneumonia and colonic ileus and urinary retention     INTERVAL HPI/ OVERNIGHT EVENTS: No new events, appears comfortable on trach collar nor foe over 24 hours . Afebrile, tolerating PEG feeds. scanty endotracheal secretions.  S/P EGD mild gastritis noted , no active bleed or mass       Past Medical History--  PAST MEDICAL & SURGICAL HISTORY:  Respiratory failure  Constipation  GERD (gastroesophageal reflux disease)  Hypertension  Respiratory failure  Diabetes mellitus  Aphasic stroke  Dementia of frontal lobe type  Tracheostomy in place  Gastrostomy in place  Hx of appendectomy      For details regarding the patient's social history, family history, and other miscellaneous elements, please refer the initial infectious diseases consultation and/or the admitting history and physical examination for this admission.      ROS:  Unable to obtain due to : Vegetative state , non verbal       Current inpatient medications :    ANTIBIOTICS/RELEVANT:  piperacillin/tazobactam IVPB. 3.375 Gram(s) IV Intermittent every 8 hours  lactobacillus acidophilus 1 Tablet(s) Oral every 8 hours  tobramycin for Nebulization 300 milliGRAM(s) Inhalation two times a day      dextrose 5%. 1000 milliLiter(s) IV Continuous <Continuous>  dextrose 50% Injectable 12.5 Gram(s) IV Push once  dextrose 50% Injectable 25 Gram(s) IV Push once  dextrose 50% Injectable 25 Gram(s) IV Push once  artificial  tears Solution 1 Drop(s) Both EYES four times a day  fentaNYL   Patch  12 MICROgram(s)/Hr 1 Patch Transdermal every 72 hours  polyethylene glycol 3350 17 Gram(s) Oral daily  sodium chloride 0.65% Nasal 1 Spray(s) Both Nostrils three times a day PRN  pantoprazole  Injectable 40 milliGRAM(s) IV Push every 12 hours  bisacodyl Suppository 10 milliGRAM(s) Rectal daily  ALBUTerol    90 MICROgram(s) HFA Inhaler 4 Puff(s) Inhalation every 6 hours PRN  simethicone 80 milliGRAM(s) Chew every 6 hours  senna 2 Tablet(s) Oral at bedtime  dextrose 10%. 500 milliLiter(s) IV Continuous <Continuous>  enoxaparin Injectable 40 milliGRAM(s) SubCutaneous daily      Objective:    07-23 @ 07:01  -  07-24 @ 07:00  --------------------------------------------------------  IN: 1865 mL / OUT: 1760 mL / NET: 105 mL    07-24 @ 07:01  -  07-24 @ 12:35  --------------------------------------------------------  IN: 475 mL / OUT: 635 mL / NET: -160 mL      T(C): 36.9 (07-24-17 @ 12:00), Max: 37.1 (07-23-17 @ 20:15)  HR: 83 (07-24-17 @ 12:00) (60 - 94)  BP: 167/88 (07-24-17 @ 12:00) (109/58 - 167/88)  RR: 19 (07-24-17 @ 12:00) (11 - 25)  SpO2: 100% (07-24-17 @ 12:00) (96% - 100%)  Wt(kg): --      Physical Exam:  GEN: NAD,   HEENT: normocephalic and atraumatic. EOMI. MARKIE. Moist mucosa. Clear Posterior pharynx.  NECK: Supple. No carotid bruits.  No lymphadenopathy or thyromegaly.  LUNGS: Coarse breath sounds on auscultation   HEART: Regular rate and rhythm without murmur.  ABDOMEN: Soft, nontender, and nondistended.  Positive bowel sounds.  No hepatosplenomegaly was noted.+ PEG  EXTREMITIES: Without any cyanosis, clubbing, rash, lesions or edema.  NEUROLOGIC: vegetative state.  SKIN: No ulceration or induration present.      LABS:                                    12.6   6.9   )-----------( 212      ( 25 Jul 2017 05:49 )             38.7     25 Jul 2017 05:49    140    |  109    |  6      ----------------------------<  144    4.1     |  26     |  1.00     Ca    8.4        25 Jul 2017 05:49  Phos  2.5       25 Jul 2017 05:49  Mg     2.4       25 Jul 2017 05:49          CAPILLARY BLOOD GLUCOSE  137 (25 Jul 2017 05:36)  120 (25 Jul 2017 00:00)  159 (24 Jul 2017 17:24)                   RECENT CULTURES:  Culture - Sputum . (07.21.17 @ 21:39)    -  Aztreonam: S <=4    -  Ceftazidime: S 4    -  Ciprofloxacin: S <=0.5    -  Imipenem: S <=1    -  Levofloxacin: S <=1    -  Meropenem: S <=1    -  Tobramycin: S <=2    -  Amikacin: S 16    -  Cefepime: S 8    -  Gentamicin: I 8    -  Piperacillin/Tazobactam: S <=8    Gram Stain:   Numerous polymorphonuclear leukocytes per low power field  No Squamous epithelial cells per low power field  Few Gram Negative Rods per oil power field  Rare Gram positive cocci in pairs per oil power field    Specimen Source: .Sputum Sputum / TRAP    Culture Results:   Few Pseudomonas aeruginosa  Normal Respiratory Elvira present    Organism Identification: Pseudomonas aeruginosa    Organism: Pseudomonas aeruginosa    Method Type: PRATIK    Culture - Blood (07.21.17 @ 16:40)    -  Candida tropicalis: Nondet    -  Coagulase negative Staphylococcus: Detec    -  Enterobacter cloacae complex: Nondet    -  Enterococcus species: Nondet    -  Pseudomonas aeruginosa: Nondet    -  Streptococcus pyogenes (Group A): Nondet    -  Acinetobacter baumanii: Nondet    -  Candida parapsilosis: Nondet    -  Escherichia coli: Nondet    -  Haemophilus influenzae: Nondet    -  Serratia marcescens: Nondet    Gram Stain:   Growth in aerobic bottle: Gram Positive Cocci in Clusters    -  Candida albicans: Nondet    -  Candida glabrata: Nondet    -  Listeria monocytogenes: Nondet    -  Multidrug (KPC pos) resistant organism: Nondet    -  Neisseria meningitidis: Nondet    -  Proteus species: Nondet    -  Streptococcus agalactiae (Group B): Nondet    -  Streptococcus sp. (Not Grp A, B or S pneumoniae): Nondet    -  Candida krusei: Nondet    -  Klebsiella oxytoca: Nondet    -  Klebsiella pneumoniae: Nondet    -  Methicillin resistant Staphylococcus aureus (MRSA): Nondet    -  Staphylococcus aureus: Nondet    -  Streptococcus pneumoniae: Nondet    -  Vancomycin resistant Enterococcus sp.: Nondet    Specimen Source: .Blood Blood-Peripheral    Organism: Blood Culture PCR    Culture Results:   Growth in aerobic bottle: Coag Negative Staphylococcus  Single set isolate, possible contaminant. Contact  Microbiology if susceptibility testing clinically  indicated.  ***Blood Panel PCR results on this specimen are available  approximately 3 hours after the Gram stain result.***  Gram stain, PCR, and/or culture results may not always  correspond due to difference in methodologies.    Organism Identification: Blood Culture PCR    Method Type: PCR    Culture - Urine (07.21.17 @ 16:31)    Specimen Source: .Urine Catheterized    Culture Results:   Culture grew 3 or more types of organisms which indicate  collection contamination; consider recollection only if clinically  indicated.      RADIOLOGY & ADDITIONAL STUDIES:  CT Chest No Cont (07.21.17 @ 18:22) >  Impression:    Limited study.    Patchy bibasilar peribronchial airspace opacities as discussed.     Gastric wall thickening, correlate for gastritis.  No bowel obstruction noted.    Other findings as discussed above.        Assessment :    Assessment :    71 year old female admitted with respiratory distress and possible GIB/ sepsis with a history of tracheostomy, gastrostomy, s/p CVA who lives in a nursing home admitted with respiratory distress and hypotension. Had hypoglycemia . She remains at high risk for aspiration . She has HCAP with pseudomonas , probably aspiration secondary to episodes of vomiting  .    She has coag negative staph bacteremia likely to be skin contaminant     Plan:   - will change to  Cipro and flagyl vis PEG x 5- 9 more days from am to complete total 10-  14 days tx   - weaning as per pulmonary   - trend CBC   - DC planning     Continue with other supportive care     I have discussed the above plan of care with patient's  at length . he expressed understanding of the the treatment plan . Risks, benefits and alternatives discussed in detail. I have asked if he has any questions or concerns and appropriately addressed them to the best of my ability .      Critical care time greater then 35 minutes reviewing notes, labs data/ imaging , discussion with multidisciplinary team.    Thank you for allowing me to participate in care of your patient .        Alejandrina Brantley MD  [phone]

## 2017-07-25 NOTE — PROGRESS NOTE ADULT - SUBJECTIVE AND OBJECTIVE BOX
ICU Progress Note    HPI:    S:    Pt seen and examined  HD # 5  Pt here for hypoxic resp failure  Improved  On TC  Being Tx for pseudomonas PNA on abx          Allergies    codeine (Hives)    Intolerances        MEDICATIONS  (STANDING):  piperacillin/tazobactam IVPB. 3.375 Gram(s) IV Intermittent every 8 hours  lactobacillus acidophilus 1 Tablet(s) Oral every 8 hours  dextrose 50% Injectable 12.5 Gram(s) IV Push once  dextrose 50% Injectable 25 Gram(s) IV Push once  dextrose 50% Injectable 25 Gram(s) IV Push once  artificial  tears Solution 1 Drop(s) Both EYES four times a day  fentaNYL   Patch  12 MICROgram(s)/Hr 1 Patch Transdermal every 72 hours  polyethylene glycol 3350 17 Gram(s) Oral daily  bisacodyl Suppository 10 milliGRAM(s) Rectal daily  simethicone 80 milliGRAM(s) Chew every 6 hours  senna 2 Tablet(s) Oral at bedtime  enoxaparin Injectable 40 milliGRAM(s) SubCutaneous daily  tobramycin for Nebulization 300 milliGRAM(s) Inhalation two times a day  pantoprazole    Tablet 40 milliGRAM(s) Oral two times a day before meals  insulin regular  human corrective regimen sliding scale   SubCutaneous every 6 hours  dextrose 5%. 1000 milliLiter(s) (50 mL/Hr) IV Continuous <Continuous>  dextrose 50% Injectable 12.5 Gram(s) IV Push once  dextrose 50% Injectable 25 Gram(s) IV Push once  amLODIPine   Tablet 2.5 milliGRAM(s) Oral two times a day    MEDICATIONS  (PRN):  sodium chloride 0.65% Nasal 1 Spray(s) Both Nostrils three times a day PRN Nasal Congestion  ALBUTerol    90 MICROgram(s) HFA Inhaler 4 Puff(s) Inhalation every 6 hours PRN Shortness of Breath and/or Wheezing      Drug Dosing Weight  Height (cm): 165 (21 Jul 2017 14:50)  Weight (kg): 60 (21 Jul 2017 14:50)  BMI (kg/m2): 22 (21 Jul 2017 14:50)  BSA (m2): 1.66 (21 Jul 2017 14:50)        VAUGHN: [x ] YES [ ] NO    DATE INSERTED:  REMOVE: [ ] YES [x ] NO  EXPLAIN: Chronic        PAST MEDICAL & SURGICAL HISTORY:  Respiratory failure  Constipation  GERD (gastroesophageal reflux disease)  Hypertension  Respiratory failure  Diabetes mellitus  Aphasic stroke  Dementia of frontal lobe type  Tracheostomy in place  Gastrostomy in place  Hx of appendectomy      FAMILY HISTORY:  No pertinent family history in first degree relatives      ROS: See HPI; otherwise, all systems reviewed and negative.    O:    ICU Vital Signs Last 24 Hrs  T(C): 36.6 (25 Jul 2017 19:49), Max: 37.4 (25 Jul 2017 08:01)  T(F): 97.9 (25 Jul 2017 19:49), Max: 99.3 (25 Jul 2017 08:01)  HR: 64 (25 Jul 2017 20:00) (59 - 74)  BP: 175/78 (25 Jul 2017 20:00) (120/59 - 184/72)  BP(mean): 112 (25 Jul 2017 20:00) (85 - 112)  ABP: --  ABP(mean): --  RR: 16 (25 Jul 2017 20:00) (11 - 22)  SpO2: 100% (25 Jul 2017 20:00) (97% - 100%)            I&O's Detail    24 Jul 2017 07:01  -  25 Jul 2017 07:00  --------------------------------------------------------  IN:    dextrose 10%: 225 mL    Enteral Tube Flush: 290 mL    Glucerna: 665 mL    Solution: 250 mL    Solution: 250 mL  Total IN: 1680 mL    OUT:    Indwelling Catheter - Urethral: 2000 mL  Total OUT: 2000 mL    Total NET: -320 mL      25 Jul 2017 07:01  -  25 Jul 2017 20:57  --------------------------------------------------------  IN:    Enteral Tube Flush: 300 mL    Glucerna: 350 mL    Pulmocare: 84 mL    Solution: 150 mL  Total IN: 884 mL    OUT:    Indwelling Catheter - Urethral: 800 mL  Total OUT: 800 mL    Total NET: 84 mL              PE:    Constitutional: Chronically ill appearing elderly F lying in bed in NAD.   Neck: No JVD, trach midline, C/D/I.   Respiratory: CTA B/L good BS B/L no W/R/R.  Cardiovascular: S1S2+ RRR no M/R/G.  Gastrointestinal: Soft, NTND. + PEG in place.  Extremities: No peripheral edema, No cyanosis, clubbing.  Neurological: Awake, tracks with eyes, not alert, contracted throughout.   Skin: No rashes, warm, moist.    LABS:    CBC Full  -  ( 25 Jul 2017 05:49 )  WBC Count : 6.9 K/uL  Hemoglobin : 12.6 g/dL  Hematocrit : 38.7 %  Platelet Count - Automated : 212 K/uL  Mean Cell Volume : 86.0 fl  Mean Cell Hemoglobin : 27.9 pg  Mean Cell Hemoglobin Concentration : 32.4 gm/dL  Auto Neutrophil # : 3.6 K/uL  Auto Lymphocyte # : 2.2 K/uL  Auto Monocyte # : 0.7 K/uL  Auto Eosinophil # : 0.3 K/uL  Auto Basophil # : 0.0 K/uL  Auto Neutrophil % : 52.6 %  Auto Lymphocyte % : 32.5 %  Auto Monocyte % : 10.1 %  Auto Eosinophil % : 4.1 %  Auto Basophil % : 0.7 %    07-25    140  |  109<H>  |  6<L>  ----------------------------<  144<H>  4.1   |  26  |  1.00    Ca    8.4<L>      25 Jul 2017 05:49  Phos  2.5     07-25  Mg     2.4     07-25          CAPILLARY BLOOD GLUCOSE  137 (25 Jul 2017 05:36)  120 (25 Jul 2017 00:00)        CARDIAC MARKERS ( 24 Jul 2017 05:59 )  x     / x     / 264 U/L / x     / x              A:    71yFemale  HD # 5    Here for:    1. Acute on chronic hypoxic resp failure 2/2  2. HCAP, pseudomonas  3. Dementia  4. s/p CVA  5. Dysphagia s/p PEG      P:    TC for now, vent PRN  Cont abx for 14d course (on antipseudomonal PCN) + flagyl + uday INH  Cont tube feeds with free water  Cont BP meds  Aspiration risk, keep HOB elevated  Chronic vaughn for urinary retention    Cont care

## 2017-07-25 NOTE — PROGRESS NOTE ADULT - SUBJECTIVE AND OBJECTIVE BOX
Interval events: patient did well overnight per nursing, no change in status.     Review of Systems:  unable to assess due to patient's baseline status    T(F): 99.3 (07-25-17 @ 08:01), Max: 99.3 (07-25-17 @ 08:01)  HR: 62 (07-25-17 @ 08:16) (59 - 94)  BP: 131/62 (07-25-17 @ 07:01) (93/52 - 217/99)  RR: 13 (07-25-17 @ 07:01) (11 - 33)  SpO2: 100% (07-25-17 @ 08:16) (96% - 100%)    CAPILLARY BLOOD GLUCOSE  137 (25 Jul 2017 05:36)    I&O's Summary    24 Jul 2017 07:01  -  25 Jul 2017 07:00  --------------------------------------------------------  IN: 1620 mL / OUT: 2000 mL / NET: -380 mL    Physical Exam:     Gen: NAD, laying in bed  Neuro: awake, not alert, diffusely contracted  HEENT: PERRLA, MMM, + oral secretions, tracheostomy in place  CV: normal s1/s2, regular rate and rhythm  Pulm: CTA anteriorly b/l  GI: soft, mild distention, PEG tube in place with site c/d/i  Ext: no edema, contracted UE and LE withdraws from pain    Meds:  enoxaparin Injectable 40 milliGRAM(s) SubCutaneous daily  piperacillin/tazobactam IVPB. 3.375 Gram(s) IV Intermittent every 8 hours  tobramycin for Nebulization 300 milliGRAM(s) Inhalation two times a day  dextrose 50% Injectable 12.5 Gram(s) IV Push once  dextrose 50% Injectable 25 Gram(s) IV Push once  dextrose 50% Injectable 25 Gram(s) IV Push once  insulin regular  human corrective regimen sliding scale   SubCutaneous every 6 hours  dextrose 50% Injectable 12.5 Gram(s) IV Push once  dextrose 50% Injectable 25 Gram(s) IV Push once  ALBUTerol    90 MICROgram(s) HFA Inhaler 4 Puff(s) Inhalation every 6 hours PRN  fentaNYL   Patch  12 MICROgram(s)/Hr 1 Patch Transdermal every 72 hours  polyethylene glycol 3350 17 Gram(s) Oral daily  bisacodyl Suppository 10 milliGRAM(s) Rectal daily  simethicone 80 milliGRAM(s) Chew every 6 hours  senna 2 Tablet(s) Oral at bedtime  pantoprazole    Tablet 40 milliGRAM(s) Oral two times a day before meals  dextrose 5%. 1000 milliLiter(s) IV Continuous <Continuous>  artificial  tears Solution 1 Drop(s) Both EYES four times a day  sodium chloride 0.65% Nasal 1 Spray(s) Both Nostrils three times a day PRN  lactobacillus acidophilus 1 Tablet(s) Oral every 8 hours                       12.6   6.9   )-----------( 212      ( 25 Jul 2017 05:49 )             38.7     07-25    140  |  109<H>  |  6<L>  ----------------------------<  144<H>  4.1   |  26  |  1.00    Ca    8.4<L>      25 Jul 2017 05:49  Phos  2.5     07-25  Mg     2.4     07-25      CENTRAL LINE:N    REID: Y chronic    A-LINE: N    GLOBAL ISSUE/BEST PRACTICE:  Analgesia: n/a  Sedation: n/a  HOB elevation: yes  Stress ulcer prophylaxis: yes  VTE prophylaxis: yes  Glycemic control: yes  Nutrition: yes    CODE STATUS: Full code

## 2017-07-26 ENCOUNTER — TRANSCRIPTION ENCOUNTER (OUTPATIENT)
Age: 72
End: 2017-07-26

## 2017-07-26 VITALS — RESPIRATION RATE: 21 BRPM | OXYGEN SATURATION: 100 % | HEART RATE: 85 BPM

## 2017-07-26 LAB
ANION GAP SERPL CALC-SCNC: 8 MMOL/L — SIGNIFICANT CHANGE UP (ref 5–17)
BASOPHILS # BLD AUTO: 0 K/UL — SIGNIFICANT CHANGE UP (ref 0–0.2)
BASOPHILS NFR BLD AUTO: 0.9 % — SIGNIFICANT CHANGE UP (ref 0–2)
BUN SERPL-MCNC: 9 MG/DL — SIGNIFICANT CHANGE UP (ref 7–23)
CALCIUM SERPL-MCNC: 8.5 MG/DL — SIGNIFICANT CHANGE UP (ref 8.5–10.1)
CHLORIDE SERPL-SCNC: 106 MMOL/L — SIGNIFICANT CHANGE UP (ref 96–108)
CO2 SERPL-SCNC: 26 MMOL/L — SIGNIFICANT CHANGE UP (ref 22–31)
CREAT SERPL-MCNC: 0.93 MG/DL — SIGNIFICANT CHANGE UP (ref 0.5–1.3)
CULTURE RESULTS: SIGNIFICANT CHANGE UP
EOSINOPHIL # BLD AUTO: 0.2 K/UL — SIGNIFICANT CHANGE UP (ref 0–0.5)
EOSINOPHIL NFR BLD AUTO: 4 % — SIGNIFICANT CHANGE UP (ref 0–6)
GLUCOSE SERPL-MCNC: 163 MG/DL — HIGH (ref 70–99)
HCT VFR BLD CALC: 39.4 % — SIGNIFICANT CHANGE UP (ref 34.5–45)
HGB BLD-MCNC: 12.9 G/DL — SIGNIFICANT CHANGE UP (ref 11.5–15.5)
LYMPHOCYTES # BLD AUTO: 1.5 K/UL — SIGNIFICANT CHANGE UP (ref 1–3.3)
LYMPHOCYTES # BLD AUTO: 28.2 % — SIGNIFICANT CHANGE UP (ref 13–44)
MAGNESIUM SERPL-MCNC: 2.4 MG/DL — SIGNIFICANT CHANGE UP (ref 1.6–2.6)
MCHC RBC-ENTMCNC: 28.4 PG — SIGNIFICANT CHANGE UP (ref 27–34)
MCHC RBC-ENTMCNC: 32.7 GM/DL — SIGNIFICANT CHANGE UP (ref 32–36)
MCV RBC AUTO: 86.6 FL — SIGNIFICANT CHANGE UP (ref 80–100)
MONOCYTES # BLD AUTO: 0.5 K/UL — SIGNIFICANT CHANGE UP (ref 0–0.9)
MONOCYTES NFR BLD AUTO: 9.7 % — HIGH (ref 1–9)
NEUTROPHILS # BLD AUTO: 3 K/UL — SIGNIFICANT CHANGE UP (ref 1.8–7.4)
NEUTROPHILS NFR BLD AUTO: 57.2 % — SIGNIFICANT CHANGE UP (ref 43–77)
PHOSPHATE SERPL-MCNC: 2.3 MG/DL — LOW (ref 2.5–4.5)
PLATELET # BLD AUTO: 216 K/UL — SIGNIFICANT CHANGE UP (ref 150–400)
POTASSIUM SERPL-MCNC: 3.5 MMOL/L — SIGNIFICANT CHANGE UP (ref 3.5–5.3)
POTASSIUM SERPL-SCNC: 3.5 MMOL/L — SIGNIFICANT CHANGE UP (ref 3.5–5.3)
RBC # BLD: 4.55 M/UL — SIGNIFICANT CHANGE UP (ref 3.8–5.2)
RBC # FLD: 14.3 % — SIGNIFICANT CHANGE UP (ref 10.3–14.5)
SODIUM SERPL-SCNC: 140 MMOL/L — SIGNIFICANT CHANGE UP (ref 135–145)
SPECIMEN SOURCE: SIGNIFICANT CHANGE UP
WBC # BLD: 5.3 K/UL — SIGNIFICANT CHANGE UP (ref 3.8–10.5)
WBC # FLD AUTO: 5.3 K/UL — SIGNIFICANT CHANGE UP (ref 3.8–10.5)

## 2017-07-26 PROCEDURE — 88312 SPECIAL STAINS GROUP 1: CPT

## 2017-07-26 PROCEDURE — 84480 ASSAY TRIIODOTHYRONINE (T3): CPT

## 2017-07-26 PROCEDURE — 83605 ASSAY OF LACTIC ACID: CPT

## 2017-07-26 PROCEDURE — 85027 COMPLETE CBC AUTOMATED: CPT

## 2017-07-26 PROCEDURE — 81001 URINALYSIS AUTO W/SCOPE: CPT

## 2017-07-26 PROCEDURE — 36415 COLL VENOUS BLD VENIPUNCTURE: CPT

## 2017-07-26 PROCEDURE — 80048 BASIC METABOLIC PNL TOTAL CA: CPT

## 2017-07-26 PROCEDURE — 93005 ELECTROCARDIOGRAM TRACING: CPT

## 2017-07-26 PROCEDURE — 84100 ASSAY OF PHOSPHORUS: CPT

## 2017-07-26 PROCEDURE — 82272 OCCULT BLD FECES 1-3 TESTS: CPT

## 2017-07-26 PROCEDURE — 87070 CULTURE OTHR SPECIMN AEROBIC: CPT

## 2017-07-26 PROCEDURE — 84443 ASSAY THYROID STIM HORMONE: CPT

## 2017-07-26 PROCEDURE — 94760 N-INVAS EAR/PLS OXIMETRY 1: CPT

## 2017-07-26 PROCEDURE — 88305 TISSUE EXAM BY PATHOLOGIST: CPT

## 2017-07-26 PROCEDURE — 82553 CREATINE MB FRACTION: CPT

## 2017-07-26 PROCEDURE — 85610 PROTHROMBIN TIME: CPT

## 2017-07-26 PROCEDURE — 82803 BLOOD GASES ANY COMBINATION: CPT

## 2017-07-26 PROCEDURE — 87040 BLOOD CULTURE FOR BACTERIA: CPT

## 2017-07-26 PROCEDURE — 36600 WITHDRAWAL OF ARTERIAL BLOOD: CPT

## 2017-07-26 PROCEDURE — 87086 URINE CULTURE/COLONY COUNT: CPT

## 2017-07-26 PROCEDURE — 83880 ASSAY OF NATRIURETIC PEPTIDE: CPT

## 2017-07-26 PROCEDURE — 96375 TX/PRO/DX INJ NEW DRUG ADDON: CPT

## 2017-07-26 PROCEDURE — 99291 CRITICAL CARE FIRST HOUR: CPT | Mod: 25

## 2017-07-26 PROCEDURE — 87186 SC STD MICRODIL/AGAR DIL: CPT

## 2017-07-26 PROCEDURE — 99232 SBSQ HOSP IP/OBS MODERATE 35: CPT | Mod: GC

## 2017-07-26 PROCEDURE — 84300 ASSAY OF URINE SODIUM: CPT

## 2017-07-26 PROCEDURE — 87150 DNA/RNA AMPLIFIED PROBE: CPT

## 2017-07-26 PROCEDURE — 82533 TOTAL CORTISOL: CPT

## 2017-07-26 PROCEDURE — 84145 PROCALCITONIN (PCT): CPT

## 2017-07-26 PROCEDURE — 84484 ASSAY OF TROPONIN QUANT: CPT

## 2017-07-26 PROCEDURE — 82550 ASSAY OF CK (CPK): CPT

## 2017-07-26 PROCEDURE — 99221 1ST HOSP IP/OBS SF/LOW 40: CPT

## 2017-07-26 PROCEDURE — 83735 ASSAY OF MAGNESIUM: CPT

## 2017-07-26 PROCEDURE — 71250 CT THORAX DX C-: CPT

## 2017-07-26 PROCEDURE — 96365 THER/PROPH/DIAG IV INF INIT: CPT

## 2017-07-26 PROCEDURE — 94002 VENT MGMT INPAT INIT DAY: CPT

## 2017-07-26 PROCEDURE — 94003 VENT MGMT INPAT SUBQ DAY: CPT

## 2017-07-26 PROCEDURE — 80053 COMPREHEN METABOLIC PANEL: CPT

## 2017-07-26 PROCEDURE — 82570 ASSAY OF URINE CREATININE: CPT

## 2017-07-26 PROCEDURE — 83036 HEMOGLOBIN GLYCOSYLATED A1C: CPT

## 2017-07-26 PROCEDURE — 96367 TX/PROPH/DG ADDL SEQ IV INF: CPT

## 2017-07-26 PROCEDURE — 84436 ASSAY OF TOTAL THYROXINE: CPT

## 2017-07-26 PROCEDURE — 74176 CT ABD & PELVIS W/O CONTRAST: CPT

## 2017-07-26 PROCEDURE — 94640 AIRWAY INHALATION TREATMENT: CPT

## 2017-07-26 PROCEDURE — 74018 RADEX ABDOMEN 1 VIEW: CPT

## 2017-07-26 PROCEDURE — 71045 X-RAY EXAM CHEST 1 VIEW: CPT

## 2017-07-26 RX ORDER — POTASSIUM PHOSPHATE, MONOBASIC POTASSIUM PHOSPHATE, DIBASIC 236; 224 MG/ML; MG/ML
15 INJECTION, SOLUTION INTRAVENOUS ONCE
Qty: 0 | Refills: 0 | Status: COMPLETED | OUTPATIENT
Start: 2017-07-26 | End: 2017-07-26

## 2017-07-26 RX ORDER — CIPROFLOXACIN LACTATE 400MG/40ML
1 VIAL (ML) INTRAVENOUS
Qty: 0 | Refills: 0 | COMMUNITY
Start: 2017-07-26

## 2017-07-26 RX ORDER — AMLODIPINE BESYLATE 2.5 MG/1
1 TABLET ORAL
Qty: 0 | Refills: 0 | COMMUNITY
Start: 2017-07-26

## 2017-07-26 RX ORDER — PANTOPRAZOLE SODIUM 20 MG/1
1 TABLET, DELAYED RELEASE ORAL
Qty: 0 | Refills: 0 | COMMUNITY
Start: 2017-07-26

## 2017-07-26 RX ORDER — ENOXAPARIN SODIUM 100 MG/ML
40 INJECTION SUBCUTANEOUS
Qty: 0 | Refills: 0 | COMMUNITY
Start: 2017-07-26

## 2017-07-26 RX ORDER — TOBRAMYCIN SULFATE 40 MG/ML
4 VIAL (ML) INJECTION
Qty: 0 | Refills: 0 | COMMUNITY
Start: 2017-07-26

## 2017-07-26 RX ORDER — ACETAMINOPHEN 500 MG
2 TABLET ORAL
Qty: 0 | Refills: 0 | COMMUNITY

## 2017-07-26 RX ORDER — SIMETHICONE 80 MG/1
1 TABLET, CHEWABLE ORAL
Qty: 0 | Refills: 0 | COMMUNITY
Start: 2017-07-26

## 2017-07-26 RX ORDER — METRONIDAZOLE 500 MG
1 TABLET ORAL
Qty: 0 | Refills: 0 | COMMUNITY
Start: 2017-07-26

## 2017-07-26 RX ORDER — LACTOBACILLUS ACIDOPHILUS 100MM CELL
1 CAPSULE ORAL
Qty: 0 | Refills: 0 | COMMUNITY
Start: 2017-07-26

## 2017-07-26 RX ORDER — POTASSIUM CHLORIDE 20 MEQ
40 PACKET (EA) ORAL EVERY 4 HOURS
Qty: 0 | Refills: 0 | Status: COMPLETED | OUTPATIENT
Start: 2017-07-26 | End: 2017-07-26

## 2017-07-26 RX ADMIN — Medication 500 MILLIGRAM(S): at 05:13

## 2017-07-26 RX ADMIN — ENOXAPARIN SODIUM 40 MILLIGRAM(S): 100 INJECTION SUBCUTANEOUS at 11:43

## 2017-07-26 RX ADMIN — Medication 1 DROP(S): at 11:44

## 2017-07-26 RX ADMIN — INSULIN HUMAN 1: 100 INJECTION, SOLUTION SUBCUTANEOUS at 11:44

## 2017-07-26 RX ADMIN — Medication 1 TABLET(S): at 05:13

## 2017-07-26 RX ADMIN — Medication 1 DROP(S): at 05:33

## 2017-07-26 RX ADMIN — Medication 40 MILLIEQUIVALENT(S): at 13:17

## 2017-07-26 RX ADMIN — POLYETHYLENE GLYCOL 3350 17 GRAM(S): 17 POWDER, FOR SOLUTION ORAL at 11:44

## 2017-07-26 RX ADMIN — AMLODIPINE BESYLATE 2.5 MILLIGRAM(S): 2.5 TABLET ORAL at 05:13

## 2017-07-26 RX ADMIN — Medication 500 MILLIGRAM(S): at 13:17

## 2017-07-26 RX ADMIN — Medication 300 MILLIGRAM(S): at 07:34

## 2017-07-26 RX ADMIN — PANTOPRAZOLE SODIUM 40 MILLIGRAM(S): 20 TABLET, DELAYED RELEASE ORAL at 05:13

## 2017-07-26 RX ADMIN — Medication 40 MILLIEQUIVALENT(S): at 09:46

## 2017-07-26 RX ADMIN — SIMETHICONE 80 MILLIGRAM(S): 80 TABLET, CHEWABLE ORAL at 05:34

## 2017-07-26 RX ADMIN — INSULIN HUMAN 1: 100 INJECTION, SOLUTION SUBCUTANEOUS at 05:33

## 2017-07-26 RX ADMIN — Medication 1 TABLET(S): at 13:17

## 2017-07-26 RX ADMIN — SIMETHICONE 80 MILLIGRAM(S): 80 TABLET, CHEWABLE ORAL at 11:43

## 2017-07-26 RX ADMIN — POTASSIUM PHOSPHATE, MONOBASIC POTASSIUM PHOSPHATE, DIBASIC 63.75 MILLIMOLE(S): 236; 224 INJECTION, SOLUTION INTRAVENOUS at 08:01

## 2017-07-26 NOTE — PROGRESS NOTE ADULT - PROBLEM SELECTOR PLAN 2
pan culture  ID eval with JOCE Wilson  empiric abx
monitor h/h, stable  transfuse as needed  protonix 40 mg bid
pan culture  ID eval with JOCE Wilson  empiric abx
pan culture  ID eval with JOCE Wilson  empiric abx with zosyn
pan culture  ID eval with JOCE Wilson appreciated  empiric abx with zosyn - switched to PO
GI eval in progress  possible scope on Monday  trend Hgb  I and O  supportive ICU care
improving with current therapy
monitor h/h  transfuse as needed  protonix 40 mg bid
improving with current therapy

## 2017-07-26 NOTE — DISCHARGE NOTE ADULT - MEDICATION SUMMARY - MEDICATIONS TO TAKE
I will START or STAY ON the medications listed below when I get home from the hospital:    metroNIDAZOLE 500 mg oral tablet  -- 1 tab(s) by mouth every 8 hours  -- Indication: For -    tobramycin 75 mg/mL inhalation solution  -- 4 milliliter(s) inhaled 2 times a day  -- Indication: For -    Tylenol 325 mg oral tablet  -- 2 tab(s) by gastrostomy tube every 6 hours  -- Indication: For -    fentaNYL 12 mcg/hr transdermal film, extended release  -- 1 patch by transdermal patch every 72 hours  -- Indication: For -    enoxaparin  -- 40 milligram(s) subcutaneous once a day  -- Indication: For -    NovoLIN N 100 units/mL subcutaneous suspension  -- 18 unit(s) subcutaneous 2 times a day  -- Indication: For -    scopolamine 1.5 mg transdermal film, extended release  -- 1 patch by transdermal patch every 72 hours  -- Indication: For -    Peridex 0.12% mucous membrane liquid  -- 15 milliliter(s) mucous membrane 2 times a day  -- Indication: For -    DuoNeb 0.5 mg-2.5 mg/3 mL inhalation solution  -- 3 milliliter(s) inhaled 4 times a day  -- Indication: For -    amLODIPine 2.5 mg oral tablet  -- 1 tab(s) by mouth 2 times a day  -- Indication: For -    BENGAY Original  -- 1 patch by transdermal patch once a day  -- Indication: For -    Pepcid 40 mg oral tablet  -- 1 tab(s) by gastrostomy tube once a day (at bedtime)  -- Indication: For -    bisacodyl 10 mg rectal suppository  -- 1 suppository(ies) rectally once a day  -- Indication: For -    MiraLax oral powder for reconstitution  -- 17 gram(s) by gastrostomy tube once a day  -- Indication: For -    potassium chloride 10 mEq oral capsule, extended release  -- 4 cap(s) by mouth once a day  -- Indication: For -    simethicone 80 mg oral tablet, chewable  -- 1 tab(s) by mouth every 6 hours  -- Indication: For -    Ocean 0.65% nasal spray  -- 2 spray(s) into nose 2 times a day  -- Indication: For -    Artificial Tears ophthalmic solution  -- 1 drop(s) to each affected eye 2 times a day  -- Indication: For -    lactobacillus acidophilus oral capsule  -- 1  by mouth once a day  -- Indication: For -    pantoprazole 40 mg oral delayed release tablet  -- 1 tab(s) by mouth 2 times a day (before meals)  -- Indication: For -    ciprofloxacin 500 mg oral tablet  -- 1 tab(s) by mouth every 12 hours  -- Indication: For -

## 2017-07-26 NOTE — DISCHARGE NOTE ADULT - CARE PLAN
Principal Discharge DX:	Respiratory distress  Goal:	breath better off vent  Instructions for follow-up, activity and diet:	follow up with Lung Dr. TORRES  Secondary Diagnosis:	Aspiration pneumonia of both lower lobes, unspecified aspiration pneumonia type  Secondary Diagnosis:	Constipation  Secondary Diagnosis:	Chronic respiratory failure  Secondary Diagnosis:	Diabetes mellitus  Secondary Diagnosis:	Fever  Secondary Diagnosis:	GERD (gastroesophageal reflux disease)

## 2017-07-26 NOTE — DISCHARGE NOTE ADULT - HOSPITAL COURSE
admitted for respiratory distress  weaned off vent  trach collar per pulm  ABX per ID for aspiration PNA  underwent EGD for G+ stools  found to have esophagitis  DC after ID and PULM clearance

## 2017-07-26 NOTE — DISCHARGE NOTE ADULT - PATIENT PORTAL LINK FT
“You can access the [employer] Patient Portal, offered by [facility], by registering with the following website: [email or web address]”

## 2017-07-26 NOTE — CHART NOTE - NSCHARTNOTEFT_GEN_A_CORE
Do you have Advance Directives (HCP / LV / Organ donation / Documentation of oral advance Directive):   (  x  )  yes    (      )    NO                                                                            Do you have LV - Living will :                                                                                                                                             (    )  yes    (    x  )   No    Do you have HCP - Health Care Proxy:                                                                                                                            (  x   )  yes   (       ) N0    Do you have DNR- Do Not Resuscitate :                                                                                                                           (      )  yes  (   x     )  No    Do you have DNI- Do Not intubate  :                                                                                                                               (      )  yes   (  x     ) No    Do you have MOLST - Medical orders for Life sustaining treatment  :                                                                    (      ) yes    (       ) No    Decision Maker :  (     ) Patient     (    x  )  HCA   (     ) Public Health Law Surrogate     (      ) Surrogate  (       ) Guardian    Goals of Care :  (  x    )   Complete Care     (       ) No Limitations                              (       )   Comfort Care       (       )  Hospice                               (      )   Limited medical Intervention / s    Medical Interventions :   (  x      )   CPR       (        )  DNR                                               (   x     )  Intubation with MV - Mechanical Ventilation  (     x ) BIPAP/CPAP    (         )   DNI                                               (     x    )  Artificial Nutrition -  IVF, TPN / PPN, Tube Feeds             (         )   No Feeding Tube                                                ( x       ) Use Antibiotics                         (          ) No Antibiotics                                                (  x       ) Blood and Blood Products     (         )   No Blood or Blood products                                                (   x       )  Dialysis                                    (         )  No Dialysis                                                (          )  Medical Management only  (         )  No Invasive Interventions or Surgery  Time spent :                        (    x   ) up to 30 minutes                       (           )   more than 30 minutes  Goals of care by palliative Rn is reviewed

## 2017-07-26 NOTE — PROGRESS NOTE ADULT - PROBLEM SELECTOR PLAN 4
IV ABX  PULM eval with Dr. ALONSO
?D/c zosyn
?D/c zosyn
ABX per ID  PULM eval with Dr. ALONSO appreciated  follow cultures
IV ABX  PULM eval with Dr. ALONSO
IV ABX  PULM eval with Dr. GAVIN holm cultures
on bowel regimen  ? fecal impaction on imaging  monitor for BM, consider enemas if needed
on bowel regimen  monitor for BM, consider enemas if needed
on bowel regimen  monitor for BM, consider enemas if needed
dropping rapidly, will follow.
on bowel regimen  ? fecal impaction on imaging  monitor for BM, consider enemas if needed
on bronchodilators  suction PRN  trach care  chest pt  elevated HOB  cont pulse ox
dropping rapidly, will follow.

## 2017-07-26 NOTE — PROGRESS NOTE ADULT - SUBJECTIVE AND OBJECTIVE BOX
CAPILLARY BLOOD GLUCOSE  160 (26 Jul 2017 05:00)  137 (26 Jul 2017 00:01)          Vital Signs Last 24 Hrs  T(C): 36.7 (26 Jul 2017 04:01), Max: 37.4 (25 Jul 2017 08:01)  T(F): 98 (26 Jul 2017 04:01), Max: 99.3 (25 Jul 2017 08:01)  HR: 66 (26 Jul 2017 05:00) (60 - 75)  BP: 108/55 (26 Jul 2017 05:00) (108/55 - 184/72)  BP(mean): 76 (26 Jul 2017 05:00) (76 - 112)  RR: 13 (26 Jul 2017 05:00) (11 - 22)  SpO2: 97% (26 Jul 2017 05:00) (96% - 100%)    pos trach  Respiratory: CTA B/L  CV: RRR, S1S2, no murmurs, rubs or gallops  Abdominal: Soft, NT, ND +BS, Last BM  Extremities: No edema, + peripheral pulses    Hemoglobin A1C, Whole Blood: 6.7 % (07-25 @ 08:01)   07-25    140  |  109<H>  |  6<L>  ----------------------------<  144<H>  4.1   |  26  |  1.00    Ca    8.4<L>      25 Jul 2017 05:49  Phos  2.5     07-25  Mg     2.4     07-25        dextrose 50% Injectable 12.5 Gram(s) IV Push once  dextrose 50% Injectable 25 Gram(s) IV Push once  dextrose 50% Injectable 25 Gram(s) IV Push once  insulin regular  human corrective regimen sliding scale   SubCutaneous every 6 hours  dextrose 50% Injectable 12.5 Gram(s) IV Push once  dextrose 50% Injectable 25 Gram(s) IV Push once

## 2017-07-26 NOTE — PROGRESS NOTE ADULT - PROBLEM SELECTOR PLAN 3
Pan culture   empiric abx  ID eval with Dr. HOBSON
? etiology  likely due to ileus+/- fecal impaction +/- gastroparesis/gastric outlet obstruction  bowel regimen  EGD  PEG tube to suction  protonix 40 mg bid  erythromycin for gut motility
Chris
Chris
Pan culture    abx per ID  ID eval with Dr. JOCE rose
Pan culture   empiric abx  ID eval with Dr. HOBSON
Pan culture   empiric abx  ID eval with Dr. HOBSON
no repeat episodes  likely due gastroparesis from hyperacid secretion in stomach  protonix 40 mg bid  erythromycin for gut motility  on tube feeds without residuals, monitor
no repeat episodes  likely due to ileus+/- fecal impaction +/- gastroparesis/gastric outlet obstruction  bowel regimen  protonix 40 mg bid  erythromycin for gut motility
? etiology  likely due to ileus+/- fecal impaction +/- gastroparesis/gastric outlet obstruction  bowel regimen  EGD  PEG tube to suction  protonix 40 mg bid  erythromycin for gut motility
PPI  keep HOB > 30 deg
will follow micro data
will follow micro data

## 2017-07-26 NOTE — PROGRESS NOTE ADULT - ASSESSMENT
A/P: 71 year old female admitted with respiratory distress and possible sepsis with a history of tracheostomy, gastrostomy, s/p CVA who lives in a nursing home and is chronically on a ventilator.
A/P: 71 year old female admitted with respiratory distress and possible sepsis with a history of tracheostomy, gastrostomy, s/p CVA who lives in a nursing home and is chronically on a ventilator.
A/P: 71F PMH Frontal lobe dementia, CVA, chronic respiratory failure s/p trach, & dysphagia s/p PEG presents with acute hypoxic respiratory failure in the setting of abdominal distension and vomiting with severe sepsis from HCAP due to Pseudomonas aeruginoa.    Neuro: at baseline, nonverbal, contracted and bedbound, c/w fentanyl patch  Cardio: HD stable,   Pulm: tolerating trach collar well,   GI: PPI IV bid, NPO for EGD, bowel regimen  Renal: resolved RYAN  Heme: lovenox dvt ppx  Endocrine: no issues  ID: Pseudomonas PNA c/w zosyn and tobramycin nebs  Skin: continue vaughn
A/P: 71F PMH Frontal lobe dementia, CVA, chronic respiratory failure s/p trach, & dysphagia s/p PEG presents with acute hypoxic respiratory failure in the setting of abdominal distension and vomiting with severe sepsis from HCAP due to Pseudomonas aeruginosa.      Neuro: at baseline, nonverbal, contracted and bedbound, c/w fentanyl patch  Cardio: HD stable,   Pulm: tolerating trach collar well, likely central sleep apnea  GI:gastritis/esophagitis, PPI PO bid, continue feeds at lower rate per family request, bowel regimen  Renal: resolved RYAN  Heme: lovenox sq dvt ppx  endocrine: HISS for hyperglycemia,   ID: pseudomonas PNA c/w zosyn andn tobramycin nebs  Skin: no lines, chronic vaughn for urine retention  Dispo: full code
A/P: 71F PMH Frontal lobe dementia, CVA, chronic respiratory failure s/p trach, & dysphagia s/p PEG presents with acute hypoxic respiratory failure in the setting of abdominal distension and vomiting with severe sepsis from HCAP due to Pseudomonas aeruginosa.      Neuro: at baseline, nonverbal, contracted and bedbound, c/w fentanyl patch  Cardio: HD stable,   Pulm: tolerating trach collar well, likely central sleep apnea, no prolonged episodes of apnea noted  GI:gastritis/esophagitis, PPI PO bid, continue feeds with 1.5 kcal/ml feeds and bowel regimen  Renal: resolved RYAN  Heme: lovenox sq dvt ppx  endocrine: HISS for hyperglycemia,   ID: pseudomonas PNA c/w zosyn day 6/14 and tobramycin nebs, convert to Cipro an Flagyl when D/c to complete 14 days  Skin: no lines, chronic vaughn for urine retention  Dispo: full code
Pt with chronic vent, CVA, admitted for tachypnea. Pneumonia, acute on chronic respiratory failure. ? Sepsis. Anemia, ? GI bleed.  Tolerated trach collar.
Pt with chronic vent, CVA, admitted for tachypnea. Pneumonia, acute on chronic respiratory failure. ? Sepsis. Anemia, ? GI bleed.  Tolerated trach collar.
Pt with chronic vent, CVA, admitted for tachypnea. Pneumonia, acute on chronic respiratory failure. ? Sepsis. Anemia, ? GI bleed.  Tolerated trach collar.  Should be ready for d/c
71F PMH Frontal lobe dementia, CVA, chronic respiratory failure s/p trach, & dysphagia s/p PEG presents with acute hypoxic respiratory failure in the setting of abdominal distension and vomiting with severe sepsis from HCAP due to Pseudomonas aeruginoa.    1. Neuro: at baseline, nonverbal, contracted, bedbound, c/w fentanyl patch  2. CV: hemodynamically stable, d/c IVF  3. Pulm: tolerating trach collar well, will evaluate for trach leak when back on vent this PM  4. GI: increase glucerna feeds as tolerated, PPI IV bid, NPO past MN for EGD in AM, bowel regimen, simethicone  5. Renal/Metabolic: resolved RYAN, replete hypophosphatemia  6. ID: Pseudomonas pneumonia, c/w Zosyn, add tobramycin nebs  7. Heme: start lovenox sq qd for DVT ppx  8. Endo: no issues  9. Skin: no lines, chronic vaughn for urinary retention  10. Dispo: full code, discussed with  in length at bedside  CC time spent: 35 min

## 2017-07-26 NOTE — PROGRESS NOTE ADULT - SUBJECTIVE AND OBJECTIVE BOX
Interval events: no acute events overnight, tolerating trach collar, tolerating tube feeding change.     Review of Systems:  unable to assess due to patient baseline mentation    T(F): 98 (07-26-17 @ 04:01), Max: 99.3 (07-25-17 @ 08:01)  HR: 75 (07-26-17 @ 07:34) (60 - 77)  BP: 132/66 (07-26-17 @ 07:00) (108/55 - 184/72)  RR: 19 (07-26-17 @ 07:00) (13 - 22)  SpO2: 100% (07-26-17 @ 07:34) (96% - 100%)    CAPILLARY BLOOD GLUCOSE  160 (26 Jul 2017 05:00)    I&O's Summary    25 Jul 2017 07:01  -  26 Jul 2017 07:00  --------------------------------------------------------  IN: 1828 mL / OUT: 1560 mL / NET: 268 mL      Physical Exam:   Gen: NAD, laying in bed  Neuro: awake, not alert, diffusely contracted  HEENT: PERRLA, MMM, + oral secretions, tracheostomy in place  CV: normal s1/s2, regular rate and rhythm  Pulm: CTA anteriorly b/l  GI: soft, mild distention, PEG tube in place with site c/d/i  Ext: no edema, contracted UE and LE withdraws from pain      Meds:  enoxaparin Injectable 40 milliGRAM(s) SubCutaneous daily  tobramycin for Nebulization 300 milliGRAM(s) Inhalation two times a day  ciprofloxacin     Tablet 500 milliGRAM(s) Oral every 12 hours  metroNIDAZOLE    Tablet 500 milliGRAM(s) Oral every 8 hours  amLODIPine   Tablet 2.5 milliGRAM(s) Oral two times a day  dextrose 50% Injectable 12.5 Gram(s) IV Push once  dextrose 50% Injectable 25 Gram(s) IV Push once  dextrose 50% Injectable 25 Gram(s) IV Push once  insulin regular  human corrective regimen sliding scale   SubCutaneous every 6 hours  dextrose 50% Injectable 12.5 Gram(s) IV Push once  dextrose 50% Injectable 25 Gram(s) IV Push once  ALBUTerol    90 MICROgram(s) HFA Inhaler 4 Puff(s) Inhalation every 6 hours PRN  fentaNYL   Patch  12 MICROgram(s)/Hr 1 Patch Transdermal every 72 hours  polyethylene glycol 3350 17 Gram(s) Oral daily  bisacodyl Suppository 10 milliGRAM(s) Rectal daily  simethicone 80 milliGRAM(s) Chew every 6 hours  senna 2 Tablet(s) Oral at bedtime  pantoprazole    Tablet 40 milliGRAM(s) Oral two times a day before meals  dextrose 5%. 1000 milliLiter(s) IV Continuous <Continuous>  potassium chloride   Powder 40 milliEquivalent(s) Oral every 4 hours  potassium phosphate IVPB 15 milliMole(s) IV Intermittent once  artificial  tears Solution 1 Drop(s) Both EYES four times a day  sodium chloride 0.65% Nasal 1 Spray(s) Both Nostrils three times a day PRN  lactobacillus acidophilus 1 Tablet(s) Oral every 8 hours                        12.9   5.3   )-----------( 216      ( 26 Jul 2017 06:49 )             39.4     07-26    140  |  106  |  9   ----------------------------<  163<H>  3.5   |  26  |  0.93    Ca    8.5      26 Jul 2017 06:49  Phos  2.3     07-26  Mg     2.4     07-26    CENTRAL LINE: N    REID: Y chronic    A-LINE: N    GLOBAL ISSUE/BEST PRACTICE:  Analgesia: n/a  Sedation: n/a  HOB elevation: yes  Stress ulcer prophylaxis: yes  VTE prophylaxis: yes  Glycemic control: yes  Nutrition: yes    CODE STATUS: Full Code Interval events: no acute events overnight, tolerating trach collar, tolerating tube feeding change.     Review of Systems:  unable to assess due to patient baseline mentation    T(F): 98 (07-26-17 @ 04:01), Max: 99.3 (07-25-17 @ 08:01)  HR: 75 (07-26-17 @ 07:34) (60 - 77)  BP: 132/66 (07-26-17 @ 07:00) (108/55 - 184/72)  RR: 19 (07-26-17 @ 07:00) (13 - 22)  SpO2: 100% (07-26-17 @ 07:34) (96% - 100%)    CAPILLARY BLOOD GLUCOSE  160 (26 Jul 2017 05:00)    I&O's Summary    25 Jul 2017 07:01  -  26 Jul 2017 07:00  --------------------------------------------------------  IN: 1828 mL / OUT: 1560 mL / NET: 268 mL      Physical Exam:   Gen: NAD, laying in bed  Neuro: awake, not alert, diffusely contracted  HEENT: PERRLA, MMM, + oral secretions, tracheostomy in place  CV: normal s1/s2, regular rate and rhythm  Pulm: CTA anteriorly b/l  GI: soft, mild distention, PEG tube in place with site c/d/i  Ext: no edema, contracted UE and LE withdraws from pain      Meds:  enoxaparin Injectable 40 milliGRAM(s) SubCutaneous daily  tobramycin for Nebulization 300 milliGRAM(s) Inhalation two times a day  ciprofloxacin     Tablet 500 milliGRAM(s) Oral every 12 hours  metroNIDAZOLE    Tablet 500 milliGRAM(s) Oral every 8 hours  amLODIPine   Tablet 2.5 milliGRAM(s) Oral two times a day  insulin regular  human corrective regimen sliding scale   SubCutaneous every 6 hours  ALBUTerol    90 MICROgram(s) HFA Inhaler 4 Puff(s) Inhalation every 6 hours PRN  fentaNYL   Patch  12 MICROgram(s)/Hr 1 Patch Transdermal every 72 hours  polyethylene glycol 3350 17 Gram(s) Oral daily  bisacodyl Suppository 10 milliGRAM(s) Rectal daily  simethicone 80 milliGRAM(s) Chew every 6 hours  senna 2 Tablet(s) Oral at bedtime  pantoprazole    Tablet 40 milliGRAM(s) Oral two times a day before meals  potassium chloride   Powder 40 milliEquivalent(s) Oral every 4 hours  potassium phosphate IVPB 15 milliMole(s) IV Intermittent once  artificial  tears Solution 1 Drop(s) Both EYES four times a day  sodium chloride 0.65% Nasal 1 Spray(s) Both Nostrils three times a day PRN  lactobacillus acidophilus 1 Tablet(s) Oral every 8 hours                        12.9   5.3   )-----------( 216      ( 26 Jul 2017 06:49 )             39.4     140  |  106  |  9   ----------------------------<  163<H>  3.5   |  26  |  0.93    Ca    8.5      26 Jul 2017 06:49  Phos  2.3     07-26  Mg     2.4     07-26    CENTRAL LINE: N    REID: Y chronic    A-LINE: N    GLOBAL ISSUE/BEST PRACTICE:  Analgesia: n/a  Sedation: n/a  HOB elevation: yes  Stress ulcer prophylaxis: yes  VTE prophylaxis: yes  Glycemic control: yes  Nutrition: yes    CODE STATUS: Full Code

## 2017-07-26 NOTE — DISCHARGE NOTE ADULT - NSTOBACCOHOTLINE_GEN_A_CS
[facility] Smokers Quitline ([phone]) [facility] Smokers Quitline ([phone]-LG-[id number])

## 2017-07-26 NOTE — PROGRESS NOTE ADULT - PROBLEM SELECTOR PLAN 5
for egd at bedside yesterday  iv ppi per GI  monitor cbc
on ppi therapy,monitor
with erosive esophagitis on imaging  protonix 40 mg bid  carafate 1 gram suspension q 6 hours
with erosive esophagitis on imaging  protonix 40 mg bid  carafate 1 gram suspension q 6 hours
on ppi therapy,monitor
for egd at bedside today  iv ppi  moniter cbc

## 2017-07-26 NOTE — PROGRESS NOTE ADULT - SUBJECTIVE AND OBJECTIVE BOX
PULMONARY/CRITICAL CARE      INTERVAL HPI/OVERNIGHT EVENTS:  Pt. unchanged. Tolerated trach collar. No sob. Opens eyes.  71y FemaleHPI:  The patient is a 71 year old female with a history of dementia, tracheostomy, and gastrostomy, s/p aphasic stroke who is vent dependent and a resident at a nursing home. Per  over the past two weeks the patient has had progressive difficulty with PEG tube feeding. The patient has vomited several times over the last two weeks and had drainage of dark fluid from the PEG tube described as coffee grounds. The stomach was decompressed earlier this week at the nursing home with a vaughn catheter via the PEG tube. The last episode of vomiting was last night. The patient began to have progressive difficulty of breath earlier today, which led the use of AMBU bag for ventilation and subsequent transfer to the ED (21 Jul 2017 13:51)        PAST MEDICAL & SURGICAL HISTORY:  Respiratory failure  Constipation  GERD (gastroesophageal reflux disease)  Hypertension  Respiratory failure  Diabetes mellitus  Aphasic stroke  Dementia of frontal lobe type  Tracheostomy in place  Gastrostomy in place  Hx of appendectomy        ICU Vital Signs Last 24 Hrs  T(C): 36.7 (26 Jul 2017 04:01), Max: 37.4 (25 Jul 2017 08:01)  T(F): 98 (26 Jul 2017 04:01), Max: 99.3 (25 Jul 2017 08:01)  HR: 75 (26 Jul 2017 07:34) (60 - 77)  BP: 132/66 (26 Jul 2017 07:00) (108/55 - 184/72)  BP(mean): 92 (26 Jul 2017 07:00) (76 - 112)  ABP: --  ABP(mean): --  RR: 19 (26 Jul 2017 07:00) (13 - 22)  SpO2: 100% (26 Jul 2017 07:34) (96% - 100%)    Qtts:     I&O's Summary    25 Jul 2017 07:01  -  26 Jul 2017 07:00  --------------------------------------------------------  IN: 1828 mL / OUT: 1560 mL / NET: 268 mL        REVIEW OF SYSTEMS:    CONSTITUTIONAL: No fever, weight loss, or fatigue  EYES: No eye pain, visual disturbances, or discharge  ENMT:  No difficulty hearing, tinnitus, vertigo; No sinus or throat pain  NECK: No pain or stiffness  BREASTS: No pain, masses, or nipple discharge  RESPIRATORY: No cough, wheezing, chills or hemoptysis; No shortness of breath : trach intact.  CARDIOVASCULAR: No chest pain, palpitations, dizziness, or leg swelling  GASTROINTESTINAL: Peg in place.No abdominal or epigastric pain. No nausea, vomiting, or hematemesis; No diarrhea or constipation. No melena or hematochezia.  GENITOURINARY: No dysuria, frequency, hematuria, or incontinence  NEUROLOGICAL:unresponsive to all stimuli. Opens eyes.  SKIN: No itching, burning, rashes, or lesions   LYMPH NODES: No enlarged glands  ENDOCRINE: No heat or cold intolerance; No hair loss  MUSCULOSKELETAL: No joint pain or swelling; No muscle, back, or extremity pain, no calf tenderness  PSYCHIATRIC: unresponsive  HEME/LYMPH: No easy bruising, or bleeding gums  ALLERGY AND IMMUNOLOGIC: No hives or eczema      PHYSICAL EXAM:    GENERAL: NAD, well-groomed, well-developed, NAD  HEAD:  Atraumatic, Normocephalic  EYES: EOMI, PERRLA, conjunctiva and sclera clear  ENMT: No tonsillar erythema, exudates, or enlargement; Moist mucous membranes, Good dentition, No lesions  NECK: Supple, No JVD, Normal thyroid: trach in place with trach collar  NERVOUS SYSTEM: Unresponsive to all stimuli. Arms and legs contracted.Opens eyes.  CHEST/LUNG: Few , rhonchi, No  wheezing, or rubs.Trach intact.  HEART: Regular rate and rhythm; No murmurs, rubs, or gallops  ABDOMEN: Soft, Nontender, Nondistended; Bowel sounds present  Peg intact.  EXTREMITIES:  2+ Peripheral Pulses, No clubbing, cyanosis, or edema  LYMPH: No lymphadenopathy noted  SKIN: No rashes or lesions            LABS:                        12.9   5.3   )-----------( 216      ( 26 Jul 2017 06:49 )             39.4     07-26    140  |  106  |  9   ----------------------------<  163<H>  3.5   |  26  |  0.93    Ca    8.5      26 Jul 2017 06:49  Phos  2.3     07-26  Mg     2.4     07-26                RADIOLOGY & ADDITIONAL STUDIES:      CRITICAL CARE TIME SPENT:

## 2017-07-26 NOTE — DISCHARGE NOTE ADULT - CARE PROVIDERS DIRECT ADDRESSES
,DirectAddress_Unknown,[email or web address],DirectAddress_Unknown

## 2017-07-26 NOTE — PROGRESS NOTE ADULT - PROBLEM SELECTOR PROBLEM 5
Anemia due to blood loss
GERD (gastroesophageal reflux disease)
GI bleed
GERD (gastroesophageal reflux disease)
GI bleed

## 2017-07-26 NOTE — DISCHARGE NOTE ADULT - SECONDARY DIAGNOSIS.
Aspiration pneumonia of both lower lobes, unspecified aspiration pneumonia type Constipation Chronic respiratory failure Diabetes mellitus Fever GERD (gastroesophageal reflux disease)

## 2017-07-26 NOTE — PROGRESS NOTE ADULT - SUBJECTIVE AND OBJECTIVE BOX
Interval history: no acute events      HPI: 72 yo female admitted with pneumonia, history of trach vent dependent, peg tube due to early onset dementia. Minimal mental status, non communicative at baseline noted to have vomiting after PEG tube feeds x 3 weeks. As per family at bedside, patient also noted to have chronic constipation. Family denies blood in stools. + occult gi bleed    Allergies:  codeine (Hives)      Medications:  piperacillin/tazobactam IVPB. 3.375 Gram(s) IV Intermittent every 8 hours  lactobacillus acidophilus 1 Tablet(s) Oral every 8 hours  iohexol 300 mG (iodine)/mL Oral Solution 500 milliLiter(s) Oral every 1 hour  dextrose 5%. 1000 milliLiter(s) IV Continuous <Continuous>  dextrose 50% Injectable 12.5 Gram(s) IV Push once  dextrose 50% Injectable 25 Gram(s) IV Push once  dextrose 50% Injectable 25 Gram(s) IV Push once  artificial  tears Solution 1 Drop(s) Both EYES four times a day  ALBUTerol    90 MICROgram(s) HFA Inhaler 2 Puff(s) Inhalation every 6 hours  fentaNYL   Patch  12 MICROgram(s)/Hr 1 Patch Transdermal every 72 hours  polyethylene glycol 3350 17 Gram(s) Oral daily  sodium chloride 0.65% Nasal 1 Spray(s) Both Nostrils three times a day PRN  dextrose 5%. 1000 milliLiter(s) IV Continuous <Continuous>  pantoprazole  Injectable 40 milliGRAM(s) IV Push every 12 hours      PMHX/PSHX:  Hypertension  Respiratory failure  Diabetes mellitus  Aphasic stroke  Dementia of frontal lobe type  Tracheostomy in place  Gastrostomy in place  Hx of appendectomy      Family history:  nc          PHYSICAL EXAM:   Vital Signs:  Vital Signs Last 24 Hrs  T(C): 37 (2017 15:05), Max: 38 (2017 11:15)  T(F): 98.6 (2017 15:05), Max: 100.4 (2017 11:15)  HR: 64 (2017 17:00) (63 - 162)  BP: 90/52 (2017 17:00) (84/45 - 172/93)  BP(mean): 66 (2017 17:00) (60 - 82)  RR: 17 (2017 17:00) (17 - 38)  SpO2: 100% (2017 17:00) (87% - 100%)  Daily     Daily Weight in k (2017 14:45)    GENERAL:  non communicative  HEENT:  NC/AT,  conjunctivae clear and pink, no thyromegaly, nodules, adenopathy, no JVD, sclera -anicteric, + trach to vent  CHEST:  Full & symmetric excursion, no increased effort, breath sounds clear  HEART:  Regular rhythm, S1, S2, no murmur/rub/S3/S4, no abdominal bruit, no edema  ABDOMEN:  Soft, non-tender, + distention,  normoactive bowel sounds,  no masses ,no hepato-splenomegaly, no signs of chronic liver disease, + peg tube  EXTEREMITIES:  no cyanosis,clubbing or edema  SKIN:  No rash/erythema/ecchymoses/petechiae/wounds/abscess/warm/dry  NEURO:  Alert, oriented, no asterixis, no tremor, no encephalopathy    LABS: Reviewed         Urinalysis Basic - ( 2017 11:34 )    Color: Yellow / Appearance: Turbid / S.015 / pH: x  Gluc: x / Ketone: Negative  / Bili: Negative / Urobili: Negative   Blood: x / Protein: 75 mg/dL / Nitrite: Negative   Leuk Esterase: Moderate / RBC: 0-2 /HPF / WBC 0-2   Sq Epi: x / Non Sq Epi: x / Bacteria: Many          Imaging:

## 2017-07-26 NOTE — DISCHARGE NOTE ADULT - CARE PROVIDER_API CALL
Saeid Espitia (), Internal Medicine  [address] [place] [address]  Phone: [phone]  Fax: [phone]    Saurabh Dupree), Critical Care Medicine; Pulmonary Disease; Sleep Medicine  [address] [place] [address]  Phone: [phone]  Fax: [phone]    Alejandrina Brantley (CAMILO), Infectious Disease  [address] [place] [address]  Phone: [phone]  Fax: [phone]

## 2017-07-26 NOTE — PROGRESS NOTE ADULT - PROBLEM SELECTOR PLAN 1
VENT per pulm / critical  now on trach collar
+ occult GI bleed with CT revealing gastric wall abnormality/dilated esophagy  unclear etiology   EGD tomorrow to rule out luminal pathology, NPO past midnight  protonix 40 mg bid
VENT per pulm / critical  now on trach collar
VENT per pulm / critical  now on trach collar
VENT per pulm / critical  now on trach collar per intensivist
s/p EGD -- no stigmata of recent hemorrhage  h/h stable, monitor
s/p EGD -- no stigmata of recent hemorrhage  h/h stable, monitor
+ occult GI bleed with CT revealing gastric wall abnormality/dilated esophagy  unclear etiology  will schedule for EGD on Monday to rule out luminal pathology  protonix 40 mg bid
Patient has had rapid decline in wbc,  recommend continuing IV abx, anticipate longer course with pseudomonas. Agree with inhaled tobramycin
Trach collar.
Trach collar.
bg numbers at goal   goal 140-180 in icu setting
cont regular insulin sliding scale coverage  standing insulin d/olivia  goal 140-180 in icu setting
emp ABX  ID follow up  monitor cx and labs  oral hygiene  asp prec  Keep HOB > 30 deg
standing insulin on hold  cont fs bg monitoring  goal 140-180 in icu setting
Patient has had rapid decline in wbc, suggestive that his may not be infectious and may very well have been triggered by aspiration event. With inability to rule out infectious process recommend continuing IV abx, anticipate 5 day course at this point
Trach collar.

## 2017-07-26 NOTE — PROGRESS NOTE ADULT - PROBLEM SELECTOR PROBLEM 1
Respiratory failure
GI bleed
Respiratory failure
Aspiration pneumonia of both lower lobes, unspecified aspiration pneumonia type
Respiratory failure
Respiratory failure
Diabetes mellitus type 2, uncontrolled
GI bleed
Hypoglycemia
Hypoglycemia
Sepsis
Aspiration pneumonia of both lower lobes, unspecified aspiration pneumonia type
Respiratory failure

## 2017-07-26 NOTE — PROGRESS NOTE ADULT - PROBLEM SELECTOR PROBLEM 4
PNA (pneumonia)
PNA (pneumonia)
Constipation
PNA (pneumonia)
Leukocytosis, unspecified type
Chronic respiratory failure
Constipation
Leukocytosis, unspecified type

## 2017-07-26 NOTE — PROGRESS NOTE ADULT - ATTENDING COMMENTS
71F PMH Frontal lobe dementia, CVA, chronic respiratory failure s/p trach, & dysphagia s/p PEG presents with acute hypoxic respiratory failure in the setting of abdominal distension and vomiting with severe sepsis from HCAP due to Pseudomonas aeruginoa.    - Mental status at baseline, c/w fentanyl patch  - HD stable  - Tolerating trach collar well, no hypoxia. Likely has central sleep apnea, but has not had prolonged episodes. Keep on TC for now  - S/p EGD with mild gastritis/esophagitis, C/w PPI po bid  - Change glucerna 1.2kcal/ml feeds to pulmocare 1.5kcal/ml to minimize volume --> she will return to glucerna 1.5kcal/ml at facility. Give free water with pulmocare  - Stable kidney function and lytes  - Pseudomonas pneumonia, c/w Zosyn (day 5/14) and tobramycin nebs. Likely to discharge to facility with Cipro & Flagyl to complete total 10-14 days per ID  - Lovenox sq for DVT ppx  - HISS for hyperglycemia, A1C 6.7  - no lines, chronic vaughn for urinary retention  - full code, discussed with  in length at bedside  CC time spent: 35 min
71F PMH Frontal lobe dementia, CVA, chronic respiratory failure s/p trach, & dysphagia s/p PEG presents with acute hypoxic respiratory failure in the setting of abdominal distension and vomiting with severe sepsis from HCAP due to Pseudomonas aeruginosa.    - Mental status at baseline, c/w fentanyl patch  - HD stable, started on amlopine for hypertension  - Tolerating trach collar well, no hypoxia. No further apneic episodes. Check ABG  - S/p EGD 7/24 with mild gastritis/esophagitis, C/w PPI po bid  - Tolerating pulmocare tube feeds, she will return to glucerna 1.5kcal/ml at facility. Give free water with pulmocare  - Stable kidney function, replete hypokalemia and hypophosphatemia  - Pseudomonas HCAP, abx changed by ID to Cipro & Flagyl (total abx day 6/14), c/w tobramycin nebs  - Lovenox sq for DVT ppx  - HISS for DM2, A1C 6.7  - no lines, chronic vaughn for urinary retention  - full code,  aware of plan  - D/c planning
72 yo F with frontal lobe dementia, CVA, vent dependent chronic respiratory failure, dysphagia with PEG admitted with respiratory distress in setting of abdominal distension and vomiting with severe sepsis from aspiration pneumonia.    --mental status at baseline, continue home fentanyl patch  --hemodynamically stable  --chronic respiratory failure, most comfortable on PS ventilation  trial of TC today  --GIB, PPI bid, plan for EGD 7/24  simethicone senna for chronic abd distension and constipation  ?change TF formula, discuss with nutrition, plan to restart at low rate  --RYAN improving, likely prerenal volume depletion  --severe sepsis from pneumonia, empiric zosyn, f/u Cx  --hypoglycemia, continue D10, decrease as tolerates  --vaughn for urinary retention   --discussed plan with  at length  --pt critically ill, CC time 40min
71F PMH Frontal lobe dementia, CVA, chronic respiratory failure s/p trach, & dysphagia s/p PEG presents with acute hypoxic respiratory failure in the setting of abdominal distension and vomiting with severe sepsis from HCAP due to Pseudomonas aeruginoa.    - Mental status at baseline, c/w fentanyl patch  - HD stable, d/c IVF once restarted on feeds post-EGD  - Tolerating trach collar well, no hypoxia. Has likely central sleep apnea  - S/p EGD with mild gastritis/esophagitis, change ppi to po bid  - Restart glucerna feeds, c/w bowel regimen  - Stable kidney function, replete hypophosphatemia  - Pseudomonas pneumonia, c/w Zosyn (day 4/14), add tobramycin nebs  - Lovenox sq for DVT ppx  - HISS for hyperglycemia, check A1C in AM  - no lines, chronic vaughn for urinary retention  - full code, discussed with  in length at bedside  CC time spent: 35 min
discussed with icu team and  at bedside

## 2017-07-26 NOTE — PROGRESS NOTE ADULT - PROBLEM SELECTOR PROBLEM 3
Fever
Vomiting
Sepsis, due to unspecified organism
GERD (gastroesophageal reflux disease)
Vomiting
Sepsis, due to unspecified organism

## 2017-07-26 NOTE — PROGRESS NOTE ADULT - SUBJECTIVE AND OBJECTIVE BOX
BREA BECKHAM is a 71yFemale , patient examined and chart reviewed. Patient being followed for possible aspiration pneumonia and colonic ileus and urinary retention     INTERVAL HPI/ OVERNIGHT EVENTS: No new events, appears comfortable on trach collar now for  over 72  hours . Afebrile, tolerating PEG feeds. scanty endotracheal secretions.  S/P EGD mild gastritis noted , no active bleed or mass       Past Medical History--  PAST MEDICAL & SURGICAL HISTORY:  Respiratory failure  Constipation  GERD (gastroesophageal reflux disease)  Hypertension  Respiratory failure  Diabetes mellitus  Aphasic stroke  Dementia of frontal lobe type  Tracheostomy in place  Gastrostomy in place  Hx of appendectomy      For details regarding the patient's social history, family history, and other miscellaneous elements, please refer the initial infectious diseases consultation and/or the admitting history and physical examination for this admission.      ROS:  Unable to obtain due to : Vegetative state , non verbal       Current inpatient medications :  lactobacillus acidophilus 1 Tablet(s) Oral every 8 hours  dextrose 50% Injectable 12.5 Gram(s) IV Push once  dextrose 50% Injectable 25 Gram(s) IV Push once  dextrose 50% Injectable 25 Gram(s) IV Push once  artificial  tears Solution 1 Drop(s) Both EYES four times a day  fentaNYL   Patch  12 MICROgram(s)/Hr 1 Patch Transdermal every 72 hours  polyethylene glycol 3350 17 Gram(s) Oral daily  bisacodyl Suppository 10 milliGRAM(s) Rectal daily  simethicone 80 milliGRAM(s) Chew every 6 hours  senna 2 Tablet(s) Oral at bedtime  enoxaparin Injectable 40 milliGRAM(s) SubCutaneous daily  tobramycin for Nebulization 300 milliGRAM(s) Inhalation two times a day  pantoprazole    Tablet 40 milliGRAM(s) Oral two times a day before meals  insulin regular  human corrective regimen sliding scale   SubCutaneous every 6 hours  dextrose 5%. 1000 milliLiter(s) (50 mL/Hr) IV Continuous <Continuous>  dextrose 50% Injectable 12.5 Gram(s) IV Push once  dextrose 50% Injectable 25 Gram(s) IV Push once  ciprofloxacin     Tablet 500 milliGRAM(s) Oral every 12 hours  metroNIDAZOLE    Tablet 500 milliGRAM(s) Oral every 8 hours  amLODIPine   Tablet 2.5 milliGRAM(s) Oral two times a day      Objective:  Vital Signs Last 24 Hrs  T(C): 36.9 (26 Jul 2017 15:34), Max: 36.9 (26 Jul 2017 00:11)  T(F): 98.4 (26 Jul 2017 15:34), Max: 98.4 (26 Jul 2017 00:11)  HR: 87 (26 Jul 2017 16:00) (62 - 96)  BP: 148/86 (26 Jul 2017 16:00) (108/55 - 178/75)  BP(mean): 111 (26 Jul 2017 16:00) (76 - 112)  RR: 21 (26 Jul 2017 16:00) (11 - 23)  SpO2: 100% (26 Jul 2017 16:00) (96% - 100%)    Physical Exam:  GEN: NAD,   HEENT: normocephalic and atraumatic. EOMI. MARKIE. Moist mucosa. Clear Posterior pharynx.  NECK: Supple. No carotid bruits.  No lymphadenopathy or thyromegaly.  LUNGS: Coarse breath sounds on auscultation   HEART: Regular rate and rhythm without murmur.  ABDOMEN: Soft, nontender, and nondistended.  Positive bowel sounds.  No hepatosplenomegaly was noted.+ PEG  EXTREMITIES: Without any cyanosis, clubbing, rash, lesions or edema.  NEUROLOGIC: vegetative state.  SKIN: No ulceration or induration present.      LABS:                                    12.6   6.9   )-----------( 212      ( 25 Jul 2017 05:49 )             38.7     25 Jul 2017 05:49    140    |  109    |  6      ----------------------------<  144    4.1     |  26     |  1.00     Ca    8.4        25 Jul 2017 05:49  Phos  2.5       25 Jul 2017 05:49  Mg     2.4       25 Jul 2017 05:49          CAPILLARY BLOOD GLUCOSE  137 (25 Jul 2017 05:36)  120 (25 Jul 2017 00:00)  159 (24 Jul 2017 17:24)         RECENT CULTURES:  Culture - Sputum . (07.21.17 @ 21:39)    -  Aztreonam: S <=4    -  Ceftazidime: S 4    -  Ciprofloxacin: S <=0.5    -  Imipenem: S <=1    -  Levofloxacin: S <=1    -  Meropenem: S <=1    -  Tobramycin: S <=2    -  Amikacin: S 16    -  Cefepime: S 8    -  Gentamicin: I 8    -  Piperacillin/Tazobactam: S <=8    Gram Stain:   Numerous polymorphonuclear leukocytes per low power field  No Squamous epithelial cells per low power field  Few Gram Negative Rods per oil power field  Rare Gram positive cocci in pairs per oil power field    Specimen Source: .Sputum Sputum / TRAP    Culture Results:   Few Pseudomonas aeruginosa  Normal Respiratory Elvira present    Organism Identification: Pseudomonas aeruginosa    Organism: Pseudomonas aeruginosa    Method Type: PRATIK    Culture - Blood (07.21.17 @ 16:40)    -  Candida tropicalis: Nondet    -  Coagulase negative Staphylococcus: Detec    -  Enterobacter cloacae complex: Nondet    -  Enterococcus species: Nondet    -  Pseudomonas aeruginosa: Nondet    -  Streptococcus pyogenes (Group A): Nondet    -  Acinetobacter baumanii: Nondet    -  Candida parapsilosis: Nondet    -  Escherichia coli: Nondet    -  Haemophilus influenzae: Nondet    -  Serratia marcescens: Nondet    Gram Stain:   Growth in aerobic bottle: Gram Positive Cocci in Clusters    -  Candida albicans: Nondet    -  Candida glabrata: Nondet    -  Listeria monocytogenes: Nondet    -  Multidrug (KPC pos) resistant organism: Nondet    -  Neisseria meningitidis: Nondet    -  Proteus species: Nondet    -  Streptococcus agalactiae (Group B): Nondet    -  Streptococcus sp. (Not Grp A, B or S pneumoniae): Nondet    -  Candida krusei: Nondet    -  Klebsiella oxytoca: Nondet    -  Klebsiella pneumoniae: Nondet    -  Methicillin resistant Staphylococcus aureus (MRSA): Nondet    -  Staphylococcus aureus: Nondet    -  Streptococcus pneumoniae: Nondet    -  Vancomycin resistant Enterococcus sp.: Nondet    Specimen Source: .Blood Blood-Peripheral    Organism: Blood Culture PCR    Culture Results:   Growth in aerobic bottle: Coag Negative Staphylococcus  Single set isolate, possible contaminant. Contact  Microbiology if susceptibility testing clinically  indicated.  ***Blood Panel PCR results on this specimen are available  approximately 3 hours after the Gram stain result.***  Gram stain, PCR, and/or culture results may not always  correspond due to difference in methodologies.    Organism Identification: Blood Culture PCR    Method Type: PCR    Culture - Urine (07.21.17 @ 16:31)    Specimen Source: .Urine Catheterized    Culture Results:   Culture grew 3 or more types of organisms which indicate  collection contamination; consider recollection only if clinically  indicated.      RADIOLOGY & ADDITIONAL STUDIES:  CT Chest No Cont (07.21.17 @ 18:22) >  Impression:    Limited study.    Patchy bibasilar peribronchial airspace opacities as discussed.     Gastric wall thickening, correlate for gastritis.  No bowel obstruction noted.    Other findings as discussed above.        Assessment :    Assessment :    71 year old female admitted with respiratory distress and possible GIB/ sepsis with a history of tracheostomy, gastrostomy, s/p CVA who lives in a nursing home admitted with respiratory distress and hypotension. Had hypoglycemia . She remains at high risk for aspiration . She has HCAP with pseudomonas , probably aspiration secondary to episodes of vomiting  .    She has coag negative staph bacteremia likely to be skin contaminant     Plan:   - will continue   Cipro and flagyl vis PEG x 5- 9 more days from am to complete total 10-  14 days tx   - weaning as per pulmonary   - trend CBC   - DC planning back to SNF    Continue with other supportive care     I have discussed the above plan of care with patient's  at length . he expressed understanding of the the treatment plan . Risks, benefits and alternatives discussed in detail. I have asked if he has any questions or concerns and appropriately addressed them to the best of my ability .      Critical care time greater then 15 minutes reviewing notes, labs data/ imaging , discussion with multidisciplinary team.    Thank you for allowing me to participate in care of your patient .        Alejandrina Brantley MD  [phone]

## 2017-07-28 DIAGNOSIS — K20.0 EOSINOPHILIC ESOPHAGITIS: ICD-10-CM

## 2017-07-28 DIAGNOSIS — F03.90 UNSPECIFIED DEMENTIA, UNSPECIFIED SEVERITY, WITHOUT BEHAVIORAL DISTURBANCE, PSYCHOTIC DISTURBANCE, MOOD DISTURBANCE, AND ANXIETY: ICD-10-CM

## 2017-07-28 DIAGNOSIS — K29.71 GASTRITIS, UNSPECIFIED, WITH BLEEDING: ICD-10-CM

## 2017-07-28 DIAGNOSIS — K21.9 GASTRO-ESOPHAGEAL REFLUX DISEASE WITHOUT ESOPHAGITIS: ICD-10-CM

## 2017-07-28 DIAGNOSIS — Z99.11 DEPENDENCE ON RESPIRATOR [VENTILATOR] STATUS: ICD-10-CM

## 2017-07-28 DIAGNOSIS — N17.9 ACUTE KIDNEY FAILURE, UNSPECIFIED: ICD-10-CM

## 2017-07-28 DIAGNOSIS — J69.0 PNEUMONITIS DUE TO INHALATION OF FOOD AND VOMIT: ICD-10-CM

## 2017-07-28 DIAGNOSIS — E16.2 HYPOGLYCEMIA, UNSPECIFIED: ICD-10-CM

## 2017-07-28 DIAGNOSIS — E83.39 OTHER DISORDERS OF PHOSPHORUS METABOLISM: ICD-10-CM

## 2017-07-28 DIAGNOSIS — J96.21 ACUTE AND CHRONIC RESPIRATORY FAILURE WITH HYPOXIA: ICD-10-CM

## 2017-07-28 DIAGNOSIS — Z86.73 PERSONAL HISTORY OF TRANSIENT ISCHEMIC ATTACK (TIA), AND CEREBRAL INFARCTION WITHOUT RESIDUAL DEFICITS: ICD-10-CM

## 2017-07-28 DIAGNOSIS — D50.0 IRON DEFICIENCY ANEMIA SECONDARY TO BLOOD LOSS (CHRONIC): ICD-10-CM

## 2017-07-28 DIAGNOSIS — K56.7 ILEUS, UNSPECIFIED: ICD-10-CM

## 2017-07-28 DIAGNOSIS — J15.1 PNEUMONIA DUE TO PSEUDOMONAS: ICD-10-CM

## 2017-07-28 DIAGNOSIS — Z93.1 GASTROSTOMY STATUS: ICD-10-CM

## 2017-07-28 DIAGNOSIS — I95.9 HYPOTENSION, UNSPECIFIED: ICD-10-CM

## 2017-07-28 DIAGNOSIS — K59.00 CONSTIPATION, UNSPECIFIED: ICD-10-CM

## 2019-01-09 NOTE — CONSULT NOTE ADULT - PROBLEM/RECOMMENDATION-3
PATIENTS ONCOLOGY RECORD LOCATED IN [facility]      Subjective     Name:  ENRIQUE MISHRA     Date:  2019  Address:  [address] [place] [address]  Home: [unfilled]  :  1960 AGE:  58 y.o.        RECORDS OBTAINED:  Patients Oncology Record is located in [facility]  
DISPLAY PLAN FREE TEXT

## 2019-01-11 ENCOUNTER — INPATIENT (INPATIENT)
Facility: HOSPITAL | Age: 74
LOS: 4 days | Discharge: NURSING, REHAB OR LONG-TERM CARE | DRG: 205 | End: 2019-01-16
Attending: INTERNAL MEDICINE | Admitting: INTERNAL MEDICINE
Payer: MEDICARE

## 2019-01-11 VITALS
WEIGHT: 130.07 LBS | HEIGHT: 62 IN | RESPIRATION RATE: 36 BRPM | SYSTOLIC BLOOD PRESSURE: 186 MMHG | HEART RATE: 136 BPM | DIASTOLIC BLOOD PRESSURE: 65 MMHG

## 2019-01-11 DIAGNOSIS — Z71.89 OTHER SPECIFIED COUNSELING: ICD-10-CM

## 2019-01-11 DIAGNOSIS — A41.9 SEPSIS, UNSPECIFIED ORGANISM: ICD-10-CM

## 2019-01-11 DIAGNOSIS — J69.0 PNEUMONITIS DUE TO INHALATION OF FOOD AND VOMIT: ICD-10-CM

## 2019-01-11 DIAGNOSIS — Z29.9 ENCOUNTER FOR PROPHYLACTIC MEASURES, UNSPECIFIED: ICD-10-CM

## 2019-01-11 DIAGNOSIS — J96.01 ACUTE RESPIRATORY FAILURE WITH HYPOXIA: ICD-10-CM

## 2019-01-11 DIAGNOSIS — K59.00 CONSTIPATION, UNSPECIFIED: ICD-10-CM

## 2019-01-11 DIAGNOSIS — E11.69 TYPE 2 DIABETES MELLITUS WITH OTHER SPECIFIED COMPLICATION: ICD-10-CM

## 2019-01-11 DIAGNOSIS — K21.9 GASTRO-ESOPHAGEAL REFLUX DISEASE WITHOUT ESOPHAGITIS: ICD-10-CM

## 2019-01-11 DIAGNOSIS — E11.9 TYPE 2 DIABETES MELLITUS WITHOUT COMPLICATIONS: ICD-10-CM

## 2019-01-11 PROBLEM — J96.90 RESPIRATORY FAILURE, UNSPECIFIED, UNSPECIFIED WHETHER WITH HYPOXIA OR HYPERCAPNIA: Chronic | Status: ACTIVE | Noted: 2017-07-21

## 2019-01-11 LAB
ANION GAP SERPL CALC-SCNC: 22 MMOL/L — HIGH (ref 5–17)
ANISOCYTOSIS BLD QL: SLIGHT — SIGNIFICANT CHANGE UP
APPEARANCE UR: ABNORMAL
BACTERIA # UR AUTO: ABNORMAL
BASE EXCESS BLDA CALC-SCNC: -0.2 MMOL/L — SIGNIFICANT CHANGE UP (ref -2–2)
BASOPHILS # BLD AUTO: 0 K/UL — SIGNIFICANT CHANGE UP (ref 0–0.2)
BASOPHILS NFR BLD AUTO: 0 % — SIGNIFICANT CHANGE UP (ref 0–2)
BILIRUB UR-MCNC: NEGATIVE — SIGNIFICANT CHANGE UP
BLOOD GAS SOURCE: SIGNIFICANT CHANGE UP
BUN SERPL-MCNC: 31 MG/DL — HIGH (ref 7–23)
CALCIUM SERPL-MCNC: 10.4 MG/DL — SIGNIFICANT CHANGE UP (ref 8.4–10.5)
CHLORIDE SERPL-SCNC: 103 MMOL/L — SIGNIFICANT CHANGE UP (ref 96–108)
CO2 SERPL-SCNC: 21 MMOL/L — LOW (ref 22–31)
COLOR SPEC: YELLOW — SIGNIFICANT CHANGE UP
CREAT SERPL-MCNC: 1.23 MG/DL — SIGNIFICANT CHANGE UP (ref 0.5–1.3)
DIFF PNL FLD: ABNORMAL
EOSINOPHIL # BLD AUTO: 0 K/UL — SIGNIFICANT CHANGE UP (ref 0–0.5)
EOSINOPHIL NFR BLD AUTO: 0 % — SIGNIFICANT CHANGE UP (ref 0–6)
EPI CELLS # UR: SIGNIFICANT CHANGE UP
FLU A RESULT: SIGNIFICANT CHANGE UP
FLU A RESULT: SIGNIFICANT CHANGE UP
FLUAV AG NPH QL: SIGNIFICANT CHANGE UP
FLUBV AG NPH QL: SIGNIFICANT CHANGE UP
GLUCOSE BLDC GLUCOMTR-MCNC: 180 MG/DL — HIGH (ref 70–99)
GLUCOSE BLDC GLUCOMTR-MCNC: 77 MG/DL — SIGNIFICANT CHANGE UP (ref 70–99)
GLUCOSE BLDC GLUCOMTR-MCNC: 92 MG/DL — SIGNIFICANT CHANGE UP (ref 70–99)
GLUCOSE BLDC GLUCOMTR-MCNC: 97 MG/DL — SIGNIFICANT CHANGE UP (ref 70–99)
GLUCOSE SERPL-MCNC: 185 MG/DL — HIGH (ref 70–99)
GLUCOSE UR QL: NEGATIVE MG/DL — SIGNIFICANT CHANGE UP
GRAM STN FLD: SIGNIFICANT CHANGE UP
HCO3 BLDA-SCNC: 24 MMOL/L — SIGNIFICANT CHANGE UP (ref 21–29)
HCT VFR BLD CALC: 53.1 % — HIGH (ref 34.5–45)
HGB BLD-MCNC: 16.1 G/DL — HIGH (ref 11.5–15.5)
HOROWITZ INDEX BLDA+IHG-RTO: 40 — SIGNIFICANT CHANGE UP
KETONES UR-MCNC: ABNORMAL
LACTATE SERPL-SCNC: 12.8 MMOL/L — CRITICAL HIGH (ref 0.7–2)
LACTATE SERPL-SCNC: 2.8 MMOL/L — HIGH (ref 0.7–2)
LACTATE SERPL-SCNC: 3.4 MMOL/L — HIGH (ref 0.7–2)
LEUKOCYTE ESTERASE UR-ACNC: ABNORMAL
LYMPHOCYTES # BLD AUTO: 35 % — SIGNIFICANT CHANGE UP (ref 13–44)
LYMPHOCYTES # BLD AUTO: 7.43 K/UL — HIGH (ref 1–3.3)
MANUAL SMEAR VERIFICATION: SIGNIFICANT CHANGE UP
MCHC RBC-ENTMCNC: 27.2 PG — SIGNIFICANT CHANGE UP (ref 27–34)
MCHC RBC-ENTMCNC: 30.3 GM/DL — LOW (ref 32–36)
MCV RBC AUTO: 89.8 FL — SIGNIFICANT CHANGE UP (ref 80–100)
MONOCYTES # BLD AUTO: 1.49 K/UL — HIGH (ref 0–0.9)
MONOCYTES NFR BLD AUTO: 7 % — SIGNIFICANT CHANGE UP (ref 2–14)
NEUTROPHILS # BLD AUTO: 11.89 K/UL — HIGH (ref 1.8–7.4)
NEUTROPHILS NFR BLD AUTO: 55 % — SIGNIFICANT CHANGE UP (ref 43–77)
NEUTS BAND # BLD: 1 % — SIGNIFICANT CHANGE UP (ref 0–8)
NITRITE UR-MCNC: NEGATIVE — SIGNIFICANT CHANGE UP
NRBC # BLD: 0 — SIGNIFICANT CHANGE UP
NRBC # BLD: SIGNIFICANT CHANGE UP /100 WBCS (ref 0–0)
PCO2 BLDA: 38 MMHG — SIGNIFICANT CHANGE UP (ref 32–46)
PH BLD: 7.41 — SIGNIFICANT CHANGE UP (ref 7.35–7.45)
PH UR: 7 — SIGNIFICANT CHANGE UP (ref 5–8)
PLAT MORPH BLD: NORMAL — SIGNIFICANT CHANGE UP
PLATELET # BLD AUTO: 237 K/UL — SIGNIFICANT CHANGE UP (ref 150–400)
PO2 BLDA: 162 MMHG — HIGH (ref 74–108)
POIKILOCYTOSIS BLD QL AUTO: SLIGHT — SIGNIFICANT CHANGE UP
POTASSIUM SERPL-MCNC: 4.7 MMOL/L — SIGNIFICANT CHANGE UP (ref 3.5–5.3)
POTASSIUM SERPL-SCNC: 4.7 MMOL/L — SIGNIFICANT CHANGE UP (ref 3.5–5.3)
PROT UR-MCNC: 30 MG/DL
RBC # BLD: 5.91 M/UL — HIGH (ref 3.8–5.2)
RBC # FLD: 15.3 % — HIGH (ref 10.3–14.5)
RBC BLD AUTO: ABNORMAL
RBC CASTS # UR COMP ASSIST: ABNORMAL /HPF (ref 0–4)
RSV RESULT: SIGNIFICANT CHANGE UP
RSV RNA RESP QL NAA+PROBE: SIGNIFICANT CHANGE UP
SAO2 % BLDA: 99 % — HIGH (ref 92–96)
SODIUM SERPL-SCNC: 146 MMOL/L — HIGH (ref 135–145)
SP GR SPEC: 1.01 — SIGNIFICANT CHANGE UP (ref 1.01–1.02)
SPECIMEN SOURCE: SIGNIFICANT CHANGE UP
TOXIC GRANULES BLD QL SMEAR: PRESENT — SIGNIFICANT CHANGE UP
UROBILINOGEN FLD QL: NEGATIVE MG/DL — SIGNIFICANT CHANGE UP
VARIANT LYMPHS # BLD: 2 % — SIGNIFICANT CHANGE UP (ref 0–6)
WBC # BLD: 21.23 K/UL — HIGH (ref 3.8–10.5)
WBC # FLD AUTO: 21.23 K/UL — HIGH (ref 3.8–10.5)
WBC UR QL: ABNORMAL

## 2019-01-11 PROCEDURE — 99497 ADVNCD CARE PLAN 30 MIN: CPT | Mod: 25

## 2019-01-11 PROCEDURE — 99285 EMERGENCY DEPT VISIT HI MDM: CPT

## 2019-01-11 PROCEDURE — 93010 ELECTROCARDIOGRAM REPORT: CPT

## 2019-01-11 PROCEDURE — 99223 1ST HOSP IP/OBS HIGH 75: CPT | Mod: AI

## 2019-01-11 PROCEDURE — 71045 X-RAY EXAM CHEST 1 VIEW: CPT | Mod: 26

## 2019-01-11 RX ORDER — POLYETHYLENE GLYCOL 3350 17 G/17G
17 POWDER, FOR SOLUTION ORAL DAILY
Qty: 0 | Refills: 0 | Status: DISCONTINUED | OUTPATIENT
Start: 2019-01-11 | End: 2019-01-16

## 2019-01-11 RX ORDER — SODIUM CHLORIDE 9 MG/ML
1000 INJECTION INTRAMUSCULAR; INTRAVENOUS; SUBCUTANEOUS
Qty: 0 | Refills: 0 | Status: DISCONTINUED | OUTPATIENT
Start: 2019-01-11 | End: 2019-01-14

## 2019-01-11 RX ORDER — FAMOTIDINE 10 MG/ML
20 INJECTION INTRAVENOUS DAILY
Qty: 0 | Refills: 0 | Status: DISCONTINUED | OUTPATIENT
Start: 2019-01-11 | End: 2019-01-15

## 2019-01-11 RX ORDER — CHLORHEXIDINE GLUCONATE 213 G/1000ML
15 SOLUTION TOPICAL
Qty: 0 | Refills: 0 | COMMUNITY

## 2019-01-11 RX ORDER — ALBUTEROL 90 UG/1
2.5 AEROSOL, METERED ORAL EVERY 6 HOURS
Qty: 0 | Refills: 0 | Status: DISCONTINUED | OUTPATIENT
Start: 2019-01-11 | End: 2019-01-16

## 2019-01-11 RX ORDER — PIPERACILLIN AND TAZOBACTAM 4; .5 G/20ML; G/20ML
3.38 INJECTION, POWDER, LYOPHILIZED, FOR SOLUTION INTRAVENOUS EVERY 8 HOURS
Qty: 0 | Refills: 0 | Status: DISCONTINUED | OUTPATIENT
Start: 2019-01-11 | End: 2019-01-14

## 2019-01-11 RX ORDER — MENTHOL AND METHYL SALICYLATE 10; 30 G/100G; G/100G
1 STICK TOPICAL
Qty: 0 | Refills: 0 | COMMUNITY

## 2019-01-11 RX ORDER — SODIUM CHLORIDE 9 MG/ML
1000 INJECTION, SOLUTION INTRAVENOUS
Qty: 0 | Refills: 0 | Status: DISCONTINUED | OUTPATIENT
Start: 2019-01-11 | End: 2019-01-16

## 2019-01-11 RX ORDER — VANCOMYCIN HCL 1 G
1000 VIAL (EA) INTRAVENOUS ONCE
Qty: 0 | Refills: 0 | Status: COMPLETED | OUTPATIENT
Start: 2019-01-11 | End: 2019-01-11

## 2019-01-11 RX ORDER — INSULIN LISPRO 100/ML
VIAL (ML) SUBCUTANEOUS EVERY 6 HOURS
Qty: 0 | Refills: 0 | Status: DISCONTINUED | OUTPATIENT
Start: 2019-01-11 | End: 2019-01-16

## 2019-01-11 RX ORDER — SODIUM CHLORIDE 0.65 %
1 AEROSOL, SPRAY (ML) NASAL
Qty: 0 | Refills: 0 | Status: DISCONTINUED | OUTPATIENT
Start: 2019-01-11 | End: 2019-01-16

## 2019-01-11 RX ORDER — SCOPALAMINE 1 MG/3D
1 PATCH, EXTENDED RELEASE TRANSDERMAL
Qty: 0 | Refills: 0 | COMMUNITY

## 2019-01-11 RX ORDER — DEXTROSE 50 % IN WATER 50 %
25 SYRINGE (ML) INTRAVENOUS ONCE
Qty: 0 | Refills: 0 | Status: DISCONTINUED | OUTPATIENT
Start: 2019-01-11 | End: 2019-01-16

## 2019-01-11 RX ORDER — SIMETHICONE 80 MG/1
60 TABLET, CHEWABLE ORAL
Qty: 0 | Refills: 0 | Status: DISCONTINUED | OUTPATIENT
Start: 2019-01-11 | End: 2019-01-15

## 2019-01-11 RX ORDER — PIPERACILLIN AND TAZOBACTAM 4; .5 G/20ML; G/20ML
3.38 INJECTION, POWDER, LYOPHILIZED, FOR SOLUTION INTRAVENOUS ONCE
Qty: 0 | Refills: 0 | Status: COMPLETED | OUTPATIENT
Start: 2019-01-11 | End: 2019-01-11

## 2019-01-11 RX ORDER — DEXTROSE 50 % IN WATER 50 %
12.5 SYRINGE (ML) INTRAVENOUS ONCE
Qty: 0 | Refills: 0 | Status: DISCONTINUED | OUTPATIENT
Start: 2019-01-11 | End: 2019-01-16

## 2019-01-11 RX ORDER — ACETAMINOPHEN 500 MG
650 TABLET ORAL ONCE
Qty: 0 | Refills: 0 | Status: COMPLETED | OUTPATIENT
Start: 2019-01-11 | End: 2019-01-11

## 2019-01-11 RX ORDER — POTASSIUM CHLORIDE 20 MEQ
4 PACKET (EA) ORAL
Qty: 0 | Refills: 0 | COMMUNITY

## 2019-01-11 RX ORDER — FAMOTIDINE 10 MG/ML
1 INJECTION INTRAVENOUS
Qty: 0 | Refills: 0 | COMMUNITY

## 2019-01-11 RX ORDER — INSULIN GLARGINE 100 [IU]/ML
20 INJECTION, SOLUTION SUBCUTANEOUS AT BEDTIME
Qty: 0 | Refills: 0 | Status: DISCONTINUED | OUTPATIENT
Start: 2019-01-11 | End: 2019-01-16

## 2019-01-11 RX ORDER — HUMAN INSULIN 100 [IU]/ML
18 INJECTION, SUSPENSION SUBCUTANEOUS
Qty: 0 | Refills: 0 | COMMUNITY

## 2019-01-11 RX ORDER — DEXTROSE 50 % IN WATER 50 %
15 SYRINGE (ML) INTRAVENOUS ONCE
Qty: 0 | Refills: 0 | Status: DISCONTINUED | OUTPATIENT
Start: 2019-01-11 | End: 2019-01-16

## 2019-01-11 RX ORDER — FENTANYL CITRATE 50 UG/ML
1 INJECTION INTRAVENOUS
Qty: 0 | Refills: 0 | Status: DISCONTINUED | OUTPATIENT
Start: 2019-01-11 | End: 2019-01-16

## 2019-01-11 RX ORDER — GLUCAGON INJECTION, SOLUTION 0.5 MG/.1ML
1 INJECTION, SOLUTION SUBCUTANEOUS ONCE
Qty: 0 | Refills: 0 | Status: DISCONTINUED | OUTPATIENT
Start: 2019-01-11 | End: 2019-01-16

## 2019-01-11 RX ORDER — ENOXAPARIN SODIUM 100 MG/ML
30 INJECTION SUBCUTANEOUS DAILY
Qty: 0 | Refills: 0 | Status: DISCONTINUED | OUTPATIENT
Start: 2019-01-11 | End: 2019-01-15

## 2019-01-11 RX ORDER — SODIUM CHLORIDE 9 MG/ML
2000 INJECTION INTRAMUSCULAR; INTRAVENOUS; SUBCUTANEOUS ONCE
Qty: 0 | Refills: 0 | Status: COMPLETED | OUTPATIENT
Start: 2019-01-11 | End: 2019-01-11

## 2019-01-11 RX ORDER — SODIUM CHLORIDE 0.65 %
2 AEROSOL, SPRAY (ML) NASAL
Qty: 0 | Refills: 0 | COMMUNITY

## 2019-01-11 RX ORDER — SODIUM CHLORIDE 9 MG/ML
1000 INJECTION, SOLUTION INTRAVENOUS ONCE
Qty: 0 | Refills: 0 | Status: COMPLETED | OUTPATIENT
Start: 2019-01-11 | End: 2019-01-11

## 2019-01-11 RX ORDER — ACETAMINOPHEN 500 MG
650 TABLET ORAL EVERY 6 HOURS
Qty: 0 | Refills: 0 | Status: DISCONTINUED | OUTPATIENT
Start: 2019-01-11 | End: 2019-01-16

## 2019-01-11 RX ORDER — IPRATROPIUM/ALBUTEROL SULFATE 18-103MCG
3 AEROSOL WITH ADAPTER (GRAM) INHALATION
Qty: 0 | Refills: 0 | COMMUNITY

## 2019-01-11 RX ADMIN — Medication 1 SPRAY(S): at 23:16

## 2019-01-11 RX ADMIN — Medication 200 MILLIGRAM(S): at 18:31

## 2019-01-11 RX ADMIN — INSULIN GLARGINE 20 UNIT(S): 100 INJECTION, SOLUTION SUBCUTANEOUS at 23:15

## 2019-01-11 RX ADMIN — Medication 650 MILLIGRAM(S): at 09:58

## 2019-01-11 RX ADMIN — ALBUTEROL 2.5 MILLIGRAM(S): 90 AEROSOL, METERED ORAL at 13:32

## 2019-01-11 RX ADMIN — PIPERACILLIN AND TAZOBACTAM 200 GRAM(S): 4; .5 INJECTION, POWDER, LYOPHILIZED, FOR SOLUTION INTRAVENOUS at 08:45

## 2019-01-11 RX ADMIN — SODIUM CHLORIDE 2000 MILLILITER(S): 9 INJECTION INTRAMUSCULAR; INTRAVENOUS; SUBCUTANEOUS at 08:40

## 2019-01-11 RX ADMIN — SODIUM CHLORIDE 2000 MILLILITER(S): 9 INJECTION INTRAMUSCULAR; INTRAVENOUS; SUBCUTANEOUS at 11:30

## 2019-01-11 RX ADMIN — FENTANYL CITRATE 1 PATCH: 50 INJECTION INTRAVENOUS at 10:32

## 2019-01-11 RX ADMIN — PIPERACILLIN AND TAZOBACTAM 25 GRAM(S): 4; .5 INJECTION, POWDER, LYOPHILIZED, FOR SOLUTION INTRAVENOUS at 16:52

## 2019-01-11 RX ADMIN — SIMETHICONE 60 MILLIGRAM(S): 80 TABLET, CHEWABLE ORAL at 18:31

## 2019-01-11 RX ADMIN — Medication 1 DROP(S): at 14:27

## 2019-01-11 RX ADMIN — Medication 200 MILLIGRAM(S): at 23:16

## 2019-01-11 RX ADMIN — Medication 250 MILLIGRAM(S): at 09:15

## 2019-01-11 RX ADMIN — FENTANYL CITRATE 1 PATCH: 50 INJECTION INTRAVENOUS at 19:24

## 2019-01-11 RX ADMIN — PIPERACILLIN AND TAZOBACTAM 3.38 GRAM(S): 4; .5 INJECTION, POWDER, LYOPHILIZED, FOR SOLUTION INTRAVENOUS at 09:15

## 2019-01-11 RX ADMIN — SODIUM CHLORIDE 1000 MILLILITER(S): 9 INJECTION, SOLUTION INTRAVENOUS at 14:15

## 2019-01-11 RX ADMIN — Medication 1 DROP(S): at 22:15

## 2019-01-11 RX ADMIN — Medication 1 SPRAY(S): at 18:31

## 2019-01-11 RX ADMIN — ENOXAPARIN SODIUM 30 MILLIGRAM(S): 100 INJECTION SUBCUTANEOUS at 12:17

## 2019-01-11 RX ADMIN — SODIUM CHLORIDE 100 MILLILITER(S): 9 INJECTION INTRAMUSCULAR; INTRAVENOUS; SUBCUTANEOUS at 11:51

## 2019-01-11 RX ADMIN — Medication 0: at 12:06

## 2019-01-11 RX ADMIN — SODIUM CHLORIDE 100 MILLILITER(S): 9 INJECTION INTRAMUSCULAR; INTRAVENOUS; SUBCUTANEOUS at 16:45

## 2019-01-11 RX ADMIN — FAMOTIDINE 20 MILLIGRAM(S): 10 INJECTION INTRAVENOUS at 12:18

## 2019-01-11 RX ADMIN — Medication 200 MILLIGRAM(S): at 12:18

## 2019-01-11 RX ADMIN — ALBUTEROL 2.5 MILLIGRAM(S): 90 AEROSOL, METERED ORAL at 19:58

## 2019-01-11 RX ADMIN — Medication 650 MILLIGRAM(S): at 09:15

## 2019-01-11 RX ADMIN — Medication 1000 MILLIGRAM(S): at 10:44

## 2019-01-11 RX ADMIN — Medication 1 SPRAY(S): at 12:17

## 2019-01-11 NOTE — ED ADULT NURSE REASSESSMENT NOTE - NS ED NURSE REASSESS COMMENT FT1
pt incontinent of brown feces watery and pt cleaned and changed repeat lactate done  fluid bolus complete sputum culture sent bloody sputum rhonchi scattered initially whch cleared with suctioning pt pending inpt bed

## 2019-01-11 NOTE — ED ADULT NURSE REASSESSMENT NOTE - NS ED NURSE REASSESS COMMENT FT1
pt looks a lot better color better less tachypneic second iv initiated to left hand for fluid boluses pt looks a lot better color better less tachypneic second iv initiated to left hand for fluid boluses pt arms contracted b/l and legs rigid stiff

## 2019-01-11 NOTE — ED PROVIDER NOTE - CARE PLAN
Principal Discharge DX:	Aspiration pneumonia  Secondary Diagnosis:	Respiratory failure  Secondary Diagnosis:	Tracheostomy in place

## 2019-01-11 NOTE — H&P ADULT - NSHPLABSRESULTS_GEN_ALL_CORE
Labs:                        16.1   21.23 )-----------( 237      ( 2019 08:26 )             53.1       146<H>  |  103  |  31<H>  ----------------------------<  185<H>  4.7   |  21<L>  |  1.23    Ca    10.4      2019 08:26            ABG - ( 2019 11:45 )  pH, Arterial: x     pH, Blood: 7.41  /  pCO2: 38    /  pO2: 162   / HCO3: 24    / Base Excess: -0.2  /  SaO2: 99          Urinalysis Basic - ( 2019 08:34 )  Color: Yellow / Appearance: Turbid / S.015 / pH: x  Gluc: x / Ketone: Trace  / Bili: Negative / Urobili: Negative mg/dL   Blood: x / Protein: 30 mg/dL / Nitrite: Negative   Leuk Esterase: Moderate / RBC: 3-5 /HPF / WBC 11-25   Sq Epi: x / Non Sq Epi: Occasional / Bacteria: Many    Lactate Trend   @ 11:32 Lactate:3.4    @ 08:26 Lactate:12.8      CAPILLARY BLOOD GLUCOSE      POCT Blood Glucose.: 97 mg/dL (2019 11:55)      EKG:  poor study, does not seem to have acute ischemic changes.   Personally Reviewed:  [x] YES     Imaging: poor study, possible right pneumonia  Personally Reviewed:  [x] YES

## 2019-01-11 NOTE — H&P ADULT - PROBLEM SELECTOR PLAN 6
IMPROVE VTE Individual Risk Assessment        RISK                                                          Points  [  ] Previous VTE                                                3  [  ] Thrombophilia                                             2  [ x ] Lower limb paralysis                                    2       (unable to hold up >15 seconds)    [  ] Current Cancer                                             2        (within 6 months)  [ x ] Immobilization > 24 hrs                              1  [ x ] ICU/CCU stay > 24 hours                            1  [ x ] Age > 60                                                    1  IMPROVE VTE Score ____5_____  High risk, will be on lovenox,.

## 2019-01-11 NOTE — ED PROVIDER NOTE - CRITICAL CARE PROVIDED
additional history taking/consultation with other physicians/direct patient care (not related to procedure)/documentation/conducted a detailed discussion of DNR status/interpretation of diagnostic studies

## 2019-01-11 NOTE — ED PROVIDER NOTE - OBJECTIVE STATEMENT
Dr. Hobbs Note: 73F from [facility] facility with acute respiratory distress secondary to complete displacement of 6Fr Trach.  Pt normally not on vent and was reported doing well last night by pt's daughter who was with her.  This morning, about 1hr PTA, pt found in respiratory distress, copious secretions from mouth, tachypneic and in respiratory distress with displaced trach with minimal secretions from trach site.

## 2019-01-11 NOTE — PROVIDER CONTACT NOTE (CRITICAL VALUE NOTIFICATION) - BACKGROUND
sent pt from [facility] with trach dislodged  in the ED, febrile, leukocytosis, elevated lactate 12.8

## 2019-01-11 NOTE — H&P ADULT - PROBLEM SELECTOR PLAN 2
Empiric Zosyn, follow up cultures, MRSA swab, urine legionella  ID consult Dr Chairez/pablo aware.   monitor lactate

## 2019-01-11 NOTE — H&P ADULT - PROBLEM SELECTOR PLAN 1
trach fixed, now tolerating trach collar  ABG acceptable. trach fixed, now tolerating trach collar  ENT to eval trach site prior to dc to SNF.  Dr Santos aware.   ABG acceptable.

## 2019-01-11 NOTE — ED PROVIDER NOTE - PMH
Aphasic stroke    Constipation    Dementia of frontal lobe type    Diabetes mellitus    GERD (gastroesophageal reflux disease)    Hypertension    Respiratory failure    Respiratory failure

## 2019-01-11 NOTE — ED PROVIDER NOTE - MEDICAL DECISION MAKING DETAILS
Dr. Hobbs Note: febrile from facility with respiratory distress from displaced trach, now replaced with smaller size trach, will r/o pneumonia, aspiration, viral, and cystitis, will need admission for fluids and antibiotics and ENT consultation

## 2019-01-11 NOTE — ED ADULT NURSE NOTE - OBJECTIVE STATEMENT
pulled out trach today and tachypneic pt with stage 2 decubitus to left posterior neck cream removed probably secondary to o2 tubing or trach dressing pulled out trach today and tachypneic pt with stage 2 decubitus to left posterior neck cream removed probably secondary to o2 tubing or trach dressing  unable to answer further snowflakes pt non verbal

## 2019-01-11 NOTE — H&P ADULT - NSHPPHYSICALEXAM_GEN_ALL_CORE
PHYSICAL EXAM:  Vital Signs Last 24 Hrs  T(C): 38.3 (11 Jan 2019 09:06), Max: 38.3 (11 Jan 2019 09:06)  T(F): 101 (11 Jan 2019 09:06), Max: 101 (11 Jan 2019 09:06)  HR: 92 (11 Jan 2019 11:58) (92 - 136)  BP: 140/78 (11 Jan 2019 11:58) (130/47 - 186/65)  BP(mean): --  RR: 14 (11 Jan 2019 11:58) (14 - 36)  SpO2: 100% (11 Jan 2019 11:58) (97% - 100%)    GENERAL: NAD, well-groomed, well-developed  HEAD:  Atraumatic, Normocephalic  EYES: , conjunctiva and sclera clear  ENMT:  trach in place with blood tinged thick yellow secretions, +thinner oral secretions, +healing injury to left posterior neck.  NERVOUS SYSTEM:  opens eyes, withdraws to pain. contracted.   CHEST/LUNG: diffuse rhonchi.   HEART: Regular rate and rhythm  ABDOMEN: Soft, Nontender, Nondistended; Bowel sounds present; +PEG  EXTREMITIES:  2+ Peripheral Pulses, No clubbing, cyanosis, or edema; +contractures.   SKIN: healed scars on buttocks.

## 2019-01-11 NOTE — CONSULT NOTE ADULT - SUBJECTIVE AND OBJECTIVE BOX
Date/Time Patient Seen:  		  Referring MD:   Data Reviewed	       Patient is a 73y old  Female who presents with a chief complaint of     Subjective/HPI    in bed  seen and examined  vs and meds reviewed  H and P reviewed  ER provider note reviewed  labs and imaging reviewed    H&P Adult [Charted Location: [place] [address]] [Authored: 11-Jan-2019 12:49]- for Visit: [id number], Complete, Revised, Signed in Full, General    History and Physical:   Source of Information	Chart(s), Physician/Provider       History of Present Illness:  History of Present Illness:   73F advanced dementia, stroke, functional quadriplegia, trach collar, dysphagia with peg, chronic constipation sent from SNF with trach dislodged.  As per family she was fine last night.  This morning trach came out and they were unable to put back in so they sent her to the ED.  In the ED she was found to be febrile, leukocytosis, elevated lactate, dehydration.   Patient is non-verbal.  Grunts in response to pain.       at bedside.  Does not have further family history.  Reports their children are alive and healthy.      Review of Systems:  Review of Systems: unable, patient non-verbal.  only grunts in response to pain.      Allergies and Intolerances:        Allergies:  	codeine: Drug, Hives     PAST MEDICAL & SURGICAL HISTORY:  Respiratory failure  Constipation  GERD (gastroesophageal reflux disease)  Hypertension  Respiratory failure  Diabetes mellitus  Aphasic stroke  Dementia of frontal lobe type  Tracheostomy in place  Gastrostomy in place  Hx of appendectomy        Medication list         MEDICATIONS  (STANDING):  ALBUTerol    0.083% 2.5 milliGRAM(s) Nebulizer every 6 hours  artificial  tears Solution 1 Drop(s) Both EYES three times a day  dextrose 5%. 1000 milliLiter(s) (50 mL/Hr) IV Continuous <Continuous>  dextrose 50% Injectable 12.5 Gram(s) IV Push once  dextrose 50% Injectable 25 Gram(s) IV Push once  dextrose 50% Injectable 25 Gram(s) IV Push once  enoxaparin Injectable 30 milliGRAM(s) SubCutaneous daily  famotidine    Tablet 20 milliGRAM(s) Oral daily  fentaNYL   Patch  12 MICROgram(s)/Hr 1 Patch Transdermal every 72 hours  guaiFENesin   Syrup  (Sugar-Free) 200 milliGRAM(s) Oral every 6 hours  insulin glargine Injectable (LANTUS) 20 Unit(s) SubCutaneous at bedtime  insulin lispro (HumaLOG) corrective regimen sliding scale   SubCutaneous every 6 hours  lactated ringers Bolus 1000 milliLiter(s) IV Bolus once  piperacillin/tazobactam IVPB. 3.375 Gram(s) IV Intermittent every 8 hours  polyethylene glycol 3350 17 Gram(s) Oral daily  simethicone drops 60 milliGRAM(s) Oral two times a day  sodium chloride 0.65% Nasal 1 Spray(s) Both Nostrils four times a day  sodium chloride 0.9%. 1000 milliLiter(s) (100 mL/Hr) IV Continuous <Continuous>    MEDICATIONS  (PRN):  acetaminophen   Tablet .. 650 milliGRAM(s) Oral every 6 hours PRN Temp greater or equal to 38C (100.4F), Mild Pain (1 - 3)  dextrose 40% Gel 15 Gram(s) Oral once PRN Blood Glucose LESS THAN 70 milliGRAM(s)/deciliter  glucagon  Injectable 1 milliGRAM(s) IntraMuscular once PRN Glucose LESS THAN 70 milligrams/deciliter         Vitals log        ICU Vital Signs Last 24 Hrs  T(C): 38.3 (11 Jan 2019 09:06), Max: 38.3 (11 Jan 2019 09:06)  T(F): 101 (11 Jan 2019 09:06), Max: 101 (11 Jan 2019 09:06)  HR: 92 (11 Jan 2019 11:58) (92 - 136)  BP: 140/78 (11 Jan 2019 11:58) (130/47 - 186/65)  BP(mean): --  ABP: --  ABP(mean): --  RR: 14 (11 Jan 2019 11:58) (14 - 36)  SpO2: 100% (11 Jan 2019 11:58) (97% - 100%)           Input and Output:  I&O's Detail    11 Jan 2019 07:01  -  11 Jan 2019 13:27  --------------------------------------------------------  IN:    Sodium Chloride 0.9% IV Bolus: 2250 mL  Total IN: 2250 mL    OUT:    Voided: 300 mL  Total OUT: 300 mL    Total NET: 1950 mL          Lab Data                        16.1   21.23 )-----------( 237      ( 11 Jan 2019 08:26 )             53.1     01-11    146<H>  |  103  |  31<H>  ----------------------------<  185<H>  4.7   |  21<L>  |  1.23    Ca    10.4      11 Jan 2019 08:26      ABG - ( 11 Jan 2019 11:45 )  pH, Arterial: x     pH, Blood: 7.41  /  pCO2: 38    /  pO2: 162   / HCO3: 24    / Base Excess: -0.2  /  SaO2: 99                      Review of Systems	      Objective     Physical Examination    heart s1s2  lung dec BS  abd soft      Pertinent Lab findings & Imaging      Annalise:  NO   Adequate UO     I&O's Detail    11 Jan 2019 07:01  -  11 Jan 2019 13:27  --------------------------------------------------------  IN:    Sodium Chloride 0.9% IV Bolus: 2250 mL  Total IN: 2250 mL    OUT:    Voided: 300 mL  Total OUT: 300 mL    Total NET: 1950 mL               Discussed with:     Cultures:	        Radiology    EXAM:  XR CHEST PORTABLE ROUTINE 1V                                  PROCEDURE DATE:  01/11/2019          INTERPRETATION:  Trach replacement.    AP chest. Prior 7/21/2017.  Limited. Patient's right hand obscures a large portion of the right lung   field.  Tracheostomy cannula in satisfactory position. Low lung volumes. No gross   infiltrates on this very limited study. No pleural collection. No change   heart mediastinum. Distended bowel in the visualized upper abdomen.    Impression: As above                  NA LIMA M.D., ATTENDING RADIOLOGIST  This document has been electronically signed. Jan 11 2019  9:25AM

## 2019-01-11 NOTE — ED PROVIDER NOTE - SEVERE SEPSIS ALERT DETAILS
Pt is deemed septic, fluids ordered and being given, on reassessment, pt with improvement in tachycardia from 130s to 110, RR from 40s to 20s, Pulse ox 100% on FiO2 40%, skin good good turgor, diminshed bs b/l, abdomen soft, nt, neuro baseline.

## 2019-01-11 NOTE — H&P ADULT - PROBLEM SELECTOR PLAN 7
25 minute discussion with  who is health care proxy and RN mae present discussing patients poor prognosis. D/w  about living will.   he believes the living will was a standard form and should be ignored.  To him her occasional grunts mean she has some cognition and wants to live.   He wishes for her to outlive him but understand she is high risk for recurrent infections and will not have any better quality of life than has been present for the past year or so.  He wishes for her to remain full code and wants all medical care possible.

## 2019-01-11 NOTE — ED PROVIDER NOTE - PROGRESS NOTE DETAILS
Dr. Hobbs Note: pt stable on trach collar FiO2 40%.  Goals of care discussion with daughter and son: wants full care, full code, will proceed with full interventions.

## 2019-01-11 NOTE — CONSULT NOTE ADULT - SUBJECTIVE AND OBJECTIVE BOX
PULMONARY/CRITICAL CARE        Patient is a 73y old  Female who presents with a chief complaint of sepsis  BRIEF HOSPITAL COURSE: ***· HPI Objective Statement: Dr. Hobbs Note: 73F from [facility] facility with acute respiratory distress secondary to complete displacement of 6Fr Trach.  Pt normally not on vent and was reported doing well last night by pt's daughter who was with her.  This morning, about 1hr PTA, pt found in respiratory distress, copious secretions from mouth, tachypneic and in respiratory distress with displaced trach with minimal secretions from trach site.  Trach replaced/  Spiked temp, hi lactate      Events last 24 hours: ***    PAST MEDICAL & SURGICAL HISTORY:  Respiratory failure--on trach, no vent  Constipation  GERD (gastroesophageal reflux disease)  Hypertension  Respiratory failure  Diabetes mellitus  Aphasic stroke  Dementia of frontal lobe type  Tracheostomy in place  Gastrostomy in place  Hx of appendectomy    Allergies    codeine (Hives)    Intolerances      FAMILY HISTORY and SOCIAL--lives in [facility]  No pertinent family history in first degree relatives      Review of Systems:  CONSTITUTIONAL: had fever, no chills, or fatigue  EYES: No eye pain, visual disturbances, or discharge  ENMT:  No difficulty hearing, tinnitus, vertigo; No sinus or throat pain  NECK: No pain or stiffness  RESPIRATORY: No cough, wheezing, chills or hemoptysis; had shortness of breath which improved with trach replacement  CARDIOVASCULAR: No chest pain, palpitations, dizziness, or leg swelling  GASTROINTESTINAL: No abdominal or epigastric pain. No nausea, vomiting, or hematemesis; No diarrhea or constipation. No melena or hematochezia.  GENITOURINARY: No dysuria, frequency, hematuria, or incontinence  NEUROLOGICAL: severely impaired.  SKIN: No itching, burning, rashes, or lesions   MUSCULOSKELETAL: No joint pain or swelling; No muscle, back, or extremity pain      Medications:  piperacillin/tazobactam IVPB. 3.375 Gram(s) IV Intermittent every 8 hours      ALBUTerol    0.083% 2.5 milliGRAM(s) Nebulizer every 6 hours  guaiFENesin   Syrup  (Sugar-Free) 200 milliGRAM(s) Oral every 6 hours    acetaminophen   Tablet .. 650 milliGRAM(s) Oral every 6 hours PRN  fentaNYL   Patch  12 MICROgram(s)/Hr 1 Patch Transdermal every 72 hours        famotidine    Tablet 20 milliGRAM(s) Oral daily  polyethylene glycol 3350 17 Gram(s) Oral daily  simethicone drops 60 milliGRAM(s) Oral two times a day      dextrose 40% Gel 15 Gram(s) Oral once PRN  dextrose 50% Injectable 12.5 Gram(s) IV Push once  dextrose 50% Injectable 25 Gram(s) IV Push once  dextrose 50% Injectable 25 Gram(s) IV Push once  glucagon  Injectable 1 milliGRAM(s) IntraMuscular once PRN  insulin lispro (HumaLOG) corrective regimen sliding scale   SubCutaneous every 6 hours    dextrose 5%. 1000 milliLiter(s) IV Continuous <Continuous>  sodium chloride 0.9%. 1000 milliLiter(s) IV Continuous <Continuous>      artificial  tears Solution 1 Drop(s) Both EYES three times a day  sodium chloride 0.65% Nasal 1 Spray(s) Both Nostrils four times a day            ICU Vital Signs Last 24 Hrs  T(C): 38.3 (2019 09:06), Max: 38.3 (2019 09:06)  T(F): 101 (2019 09:06), Max: 101 (2019 09:06)  HR: 108 (2019 10:00) (108 - 136)  BP: 130/47 (2019 10:00) (130/47 - 186/65)  BP(mean): --  ABP: --  ABP(mean): --  RR: 18 (2019 10:00) (18 - 36)  SpO2: 98% (2019 10:00) (97% - 98%)    Vital Signs Last 24 Hrs  T(C): 38.3 (2019 09:06), Max: 38.3 (2019 09:06)  T(F): 101 (2019 09:06), Max: 101 (2019 09:06)  HR: 108 (2019 10:00) (108 - 136)  BP: 130/47 (2019 10:00) (130/47 - 186/65)  BP(mean): --  RR: 18 (2019 10:00) (18 - 36)  SpO2: 98% (2019 10:00) (97% - 98%)        I&O's Detail        LABS:                        16.1   21.23 )-----------( 237      ( 2019 08:26 )             53.1     01-11    146<H>  |  103  |  31<H>  ----------------------------<  185<H>  4.7   |  21<L>  |  1.23    Ca    10.4      2019 08:26            CAPILLARY BLOOD GLUCOSE      POCT Blood Glucose.: 180 mg/dL (2019 08:07)      Urinalysis Basic - ( 2019 08:34 )    Color: Yellow / Appearance: Turbid / S.015 / pH: x  Gluc: x / Ketone: Trace  / Bili: Negative / Urobili: Negative mg/dL   Blood: x / Protein: 30 mg/dL / Nitrite: Negative   Leuk Esterase: Moderate / RBC: 3-5 /HPF / WBC 11-25   Sq Epi: x / Non Sq Epi: Occasional / Bacteria: Many      CULTURES:      Physical Examination:    General thin female mild dyspnea    HEENT: Pupils equal, reactive to light.  Symmetric. Eyes deviated to right    PULM: trach intact, few rhonchi bilaterally, decreased excursion    CVS: Regular rate and rhythm, no murmurs, rubs, or gallops    ABD: Soft, nondistended, nontender, normoactive bowel sounds, no masses Peg intact    EXT: No edema, nontender Contracted extremities    SKIN: Warm and well perfused, no rashes noted.    NEURO: opens eyes, unresponsive to all stimuli.    RADIOLOGY: ***< from: Xray Chest 1 View- PORTABLE-Routine (19 @ 08:51) >  EXAM:  XR CHEST PORTABLE ROUTINE 1V                                  PROCEDURE DATE:  2019          INTERPRETATION:  Trach replacement.    AP chest. Prior 2017.  Limited. Patient's right hand obscures a large portion of the right lung   field.  Tracheostomy cannula in satisfactory position. Low lung volumes. No gross   infiltrates on this very limited study. No pleural collection. No change   heart mediastinum. Distended bowel in the visualized upper abdomen.    Impression: As above                  NA LIMA M.D., ATTENDING RADIOLOGIST  This document has been electronically signed. 2019  9:25AM              < end of copied text >      CRITICAL CARE TIME SPENT: ***

## 2019-01-11 NOTE — ED ADULT NURSE REASSESSMENT NOTE - NS ED NURSE REASSESS COMMENT FT1
pt cleaned and changed incontinent pt re positioned and comfortable HOB 45 degrees pt pending spcu bed G tube 50 cc residual prior to meds

## 2019-01-11 NOTE — H&P ADULT - ASSESSMENT
73F advanced dementia, stroke, functional quadriplegia, trach collar, dysphagia with peg, chronic constipation sent from SNF with trach dislodged which was replaced by ED doc.  Found to have:  Fever and leukocytosis - probably due to  pneumonia which is either aspiration or pseudomonas (had in past)  elevated lactate - likely combination of septic shock and hypoxia

## 2019-01-11 NOTE — ED ADULT NURSE NOTE - NSIMPLEMENTINTERV_GEN_ALL_ED
Implemented All Fall Risk Interventions:  Bath to call system. Call bell, personal items and telephone within reach. Instruct patient to call for assistance. Room bathroom lighting operational. Non-slip footwear when patient is off stretcher. Physically safe environment: no spills, clutter or unnecessary equipment. Stretcher in lowest position, wheels locked, appropriate side rails in place. Provide visual cue, wrist band, yellow gown, etc. Monitor gait and stability. Monitor for mental status changes and reorient to person, place, and time. Review medications for side effects contributing to fall risk. Reinforce activity limits and safety measures with patient and family.

## 2019-01-11 NOTE — CONSULT NOTE ADULT - SUBJECTIVE AND OBJECTIVE BOX
HPI:  73F advanced dementia, stroke, functional quadriplegia, trach collar, dysphagia with peg, chronic constipation sent from SNF with trach dislodged.  As per family she was fine last night.  This morning trach came out and they were unable to put back in so they sent her to the ED.    Ed attending replaced trach.  However, was only able to place a size 4.   Some bleeding noted after trach placement that seems to have stopped.  In the ED she was found to be febrile, leukocytosis, elevated lactate, dehydration.     Patient is non-verbal.  Grunts in response to pain.     at bedside.  Does not have further family history.  Reports their children are alive and healthy. (11 Jan 2019 12:49)    Past Medical & Surgical Hx:  PAST MEDICAL & SURGICAL HISTORY:  Respiratory failure  Constipation  GERD (gastroesophageal reflux disease)  Hypertension  Respiratory failure  Diabetes mellitus  Aphasic stroke  Dementia of frontal lobe type  Tracheostomy in place  Gastrostomy in place  Hx of appendectomy    Social History--  EtOH: denies   Tobacco: denies   Drug Use: denies   Resident of [facility]    FAMILY HISTORY:  No pertinent family history in first degree relatives      Allergies  codeine (Hives)    Home Medications:  albuterol 2.5 mg/3 mL (0.083%) inhalation solution: 3 milliliter(s) inhaled every 6 hours, As Needed (11 Jan 2019 09:42)  Artificial Tears ophthalmic solution: 1 drop(s) to each affected eye 2 times a day (11 Jan 2019 09:10)  fentaNYL 12 mcg/hr transdermal film, extended release: 1 film(s) transdermal every 72 hours (11 Jan 2019 09:42)  fentaNYL 12 mcg/hr transdermal film, extended release: 1 patch transdermal every 72 hours (11 Jan 2019 09:10)  HumuLIN N 100 units/mL subcutaneous suspension: 18 unit(s) subcutaneous 2 times a day (11 Jan 2019 09:42)  MiraLax oral powder for reconstitution: 17 gram(s) by gastrostomy tube once a day (11 Jan 2019 09:10)  Mylicon 40 mg/0.6 mL oral liquid: 1.2 milliliter(s) by gastrostomy tube 2 times a day (11 Jan 2019 09:42)  Pepcid 40 mg oral tablet: 1 tab(s) by gastrostomy tube once a day (11 Jan 2019 09:42)  Saline Nasal Mist 0.65% nasal solution: 2 drop(s) nasal every 2 hours (11 Jan 2019 09:42)  Tylenol 325 mg oral tablet: 2 tab(s) by gastrostomy tube every 6 hours (11 Jan 2019 09:10)      Current Inpatient Medications :    ANTIBIOTICS:   piperacillin/tazobactam IVPB. 3.375 Gram(s) IV Intermittent every 8 hours      OTHER RELEVANT MEDICATIONS :  acetaminophen   Tablet .. 650 milliGRAM(s) Oral every 6 hours PRN  ALBUTerol    0.083% 2.5 milliGRAM(s) Nebulizer every 6 hours  artificial  tears Solution 1 Drop(s) Both EYES three times a day  dextrose 40% Gel 15 Gram(s) Oral once PRN  dextrose 5%. 1000 milliLiter(s) IV Continuous <Continuous>  dextrose 50% Injectable 12.5 Gram(s) IV Push once  dextrose 50% Injectable 25 Gram(s) IV Push once  dextrose 50% Injectable 25 Gram(s) IV Push once  enoxaparin Injectable 30 milliGRAM(s) SubCutaneous daily  famotidine    Tablet 20 milliGRAM(s) Oral daily  fentaNYL   Patch  12 MICROgram(s)/Hr 1 Patch Transdermal every 72 hours  glucagon  Injectable 1 milliGRAM(s) IntraMuscular once PRN  guaiFENesin   Syrup  (Sugar-Free) 200 milliGRAM(s) Oral every 6 hours  insulin glargine Injectable (LANTUS) 20 Unit(s) SubCutaneous at bedtime  insulin lispro (HumaLOG) corrective regimen sliding scale   SubCutaneous every 6 hours  polyethylene glycol 3350 17 Gram(s) Oral daily  simethicone drops 60 milliGRAM(s) Oral two times a day  sodium chloride 0.65% Nasal 1 Spray(s) Both Nostrils four times a day  sodium chloride 0.9%. 1000 milliLiter(s) IV Continuous <Continuous>    ROS:  Unable to obtain due to : pt' condition      I&O's Detail    11 Jan 2019 07:01  -  11 Jan 2019 18:12  --------------------------------------------------------  IN:    Sodium Chloride 0.9% IV Bolus: 2250 mL    sodium chloride 0.9%.: 600 mL    Solution: 250 mL    Solution: 100 mL  Total IN: 3200 mL    OUT:    Voided: 300 mL  Total OUT: 300 mL    Total NET: 2900 mL    Physical Exam:  Vital Signs Last 24 Hrs  T(C): 36.9 (11 Jan 2019 16:44), Max: 38.3 (11 Jan 2019 09:06)  T(F): 98.4 (11 Jan 2019 16:44), Max: 101 (11 Jan 2019 09:06)  HR: 86 (11 Jan 2019 16:44) (74 - 136)  BP: 146/72 (11 Jan 2019 16:44) (130/47 - 186/65)  RR: 19 (11 Jan 2019 16:44) (14 - 36)  SpO2: 100% (11 Jan 2019 16:44) (97% - 100%)  Height (cm): 157.48 (01-11 @ 07:40)  Weight (kg): 59 (01-11 @ 07:40)  BMI (kg/m2): 23.8 (01-11 @ 07:40)  BSA (m2): 1.59 (01-11 @ 07:40)    General: nonverbal  Eyes: sclera anicteric, pupils equal and reactive to light  ENMT: buccal mucosa moist, pharynx not injected  Neck: supple, +trach collar  Lungs: Decreased no wheeze/rhonchi  Cardiovascular: regular rate and rhythm, S1 S2  Abdomen: soft, nontender, +PEG, bowel sounds normal  Neurological:  alert and oriented x3, Cranial Nerves II-XII grossly intact  Skin: no rash  Lymph Nodes: no palpable cervical/supraclavicular lymph nodes enlargements  Extremities: +edema +contracted    Labs:                         16.1   21.23 )-----------( 237      ( 11 Jan 2019 08:26 )             53.1   01-11    146<H>  |  103  |  31<H>  ----------------------------<  185<H>  4.7   |  21<L>  |  1.23    Ca    10.4      11 Jan 2019 08:26    RECENT CULTURES:  pending      RADIOLOGY & ADDITIONAL STUDIES:    EXAM:  XR CHEST PORTABLE ROUTINE 1V                            PROCEDURE DATE:  01/11/2019      INTERPRETATION:  Trach replacement.    AP chest. Prior 7/21/2017.  Limited. Patient's right hand obscures a large portion of the right lung   field.  Tracheostomy cannula in satisfactory position. Low lung volumes. No gross   infiltrates on this very limited study. No pleural collection. No change   heart mediastinum. Distended bowel in the visualized upper abdomen.    Impression: As above      Assessment :             Plan :       Continue with present regime .  Approptiate use of antibiotics and adverse effects reviewed.      I have discussed the above plan of care with patient/family in detail. They expressed understanding of the treatment plan . Risks, benefits and alternatives discussed in detail. I have asked if they have any questions or concerns and appropriately addressed them to the best of my ability .      > 45 minutes spent in direct patient care reviewing  the notes, lab data/ imaging , discussion with multidisciplinary team. All questions were addressed and answered to the best of my capacity .    Thank you for allowing me to participate in the care of your patient .      Eusebio Chairez MD  Infectious Disease  [phone] HPI:  73F advanced dementia, CVA, functional quadriplegia, trach collar, dysphagia with peg, nonverbal sent from [facility] with trach dislodged.  As per family she was fine last night.  This morning trach came out and they were unable to put back in so they sent her to the ED.  ED attending replaced trach, noted to have some bleeding around trach placement which then stopped. However while in the ED she was found to be febrile to 101 and WBC was 21K. Noted to lactate of 12. CXR clear and UA +. No documented n/v/d. Pt is unable to provide history as she is non-verbal.      Infectious Disease consult was called to evaluate pt and for antibiotic management.    Past Medical & Surgical Hx:  PAST MEDICAL & SURGICAL HISTORY:  Respiratory failure  Constipation  GERD (gastroesophageal reflux disease)  Hypertension  Respiratory failure  Diabetes mellitus  Aphasic stroke  Dementia of frontal lobe type  Tracheostomy in place  Gastrostomy in place  Hx of appendectomy    Social History--  EtOH: denies   Tobacco: denies   Drug Use: denies   Resident of [facility]    FAMILY HISTORY:  No pertinent family history in first degree relatives      Allergies  codeine (Hives)    Home Medications:  albuterol 2.5 mg/3 mL (0.083%) inhalation solution: 3 milliliter(s) inhaled every 6 hours, As Needed (11 Jan 2019 09:42)  Artificial Tears ophthalmic solution: 1 drop(s) to each affected eye 2 times a day (11 Jan 2019 09:10)  fentaNYL 12 mcg/hr transdermal film, extended release: 1 film(s) transdermal every 72 hours (11 Jan 2019 09:42)  fentaNYL 12 mcg/hr transdermal film, extended release: 1 patch transdermal every 72 hours (11 Jan 2019 09:10)  HumuLIN N 100 units/mL subcutaneous suspension: 18 unit(s) subcutaneous 2 times a day (11 Jan 2019 09:42)  MiraLax oral powder for reconstitution: 17 gram(s) by gastrostomy tube once a day (11 Jan 2019 09:10)  Mylicon 40 mg/0.6 mL oral liquid: 1.2 milliliter(s) by gastrostomy tube 2 times a day (11 Jan 2019 09:42)  Pepcid 40 mg oral tablet: 1 tab(s) by gastrostomy tube once a day (11 Jan 2019 09:42)  Saline Nasal Mist 0.65% nasal solution: 2 drop(s) nasal every 2 hours (11 Jan 2019 09:42)  Tylenol 325 mg oral tablet: 2 tab(s) by gastrostomy tube every 6 hours (11 Jan 2019 09:10)      Current Inpatient Medications :    ANTIBIOTICS:   piperacillin/tazobactam IVPB. 3.375 Gram(s) IV Intermittent every 8 hours      OTHER RELEVANT MEDICATIONS :  acetaminophen   Tablet .. 650 milliGRAM(s) Oral every 6 hours PRN  ALBUTerol    0.083% 2.5 milliGRAM(s) Nebulizer every 6 hours  artificial  tears Solution 1 Drop(s) Both EYES three times a day  dextrose 40% Gel 15 Gram(s) Oral once PRN  dextrose 5%. 1000 milliLiter(s) IV Continuous <Continuous>  dextrose 50% Injectable 12.5 Gram(s) IV Push once  dextrose 50% Injectable 25 Gram(s) IV Push once  dextrose 50% Injectable 25 Gram(s) IV Push once  enoxaparin Injectable 30 milliGRAM(s) SubCutaneous daily  famotidine    Tablet 20 milliGRAM(s) Oral daily  fentaNYL   Patch  12 MICROgram(s)/Hr 1 Patch Transdermal every 72 hours  glucagon  Injectable 1 milliGRAM(s) IntraMuscular once PRN  guaiFENesin   Syrup  (Sugar-Free) 200 milliGRAM(s) Oral every 6 hours  insulin glargine Injectable (LANTUS) 20 Unit(s) SubCutaneous at bedtime  insulin lispro (HumaLOG) corrective regimen sliding scale   SubCutaneous every 6 hours  polyethylene glycol 3350 17 Gram(s) Oral daily  simethicone drops 60 milliGRAM(s) Oral two times a day  sodium chloride 0.65% Nasal 1 Spray(s) Both Nostrils four times a day  sodium chloride 0.9%. 1000 milliLiter(s) IV Continuous <Continuous>    ROS:  Unable to obtain due to : pt' condition      I&O's Detail    11 Jan 2019 07:01  -  11 Jan 2019 18:12  --------------------------------------------------------  IN:    Sodium Chloride 0.9% IV Bolus: 2250 mL    sodium chloride 0.9%.: 600 mL    Solution: 250 mL    Solution: 100 mL  Total IN: 3200 mL    OUT:    Voided: 300 mL  Total OUT: 300 mL    Total NET: 2900 mL    Physical Exam:  Vital Signs Last 24 Hrs  T(C): 36.9 (11 Jan 2019 16:44), Max: 38.3 (11 Jan 2019 09:06)  T(F): 98.4 (11 Jan 2019 16:44), Max: 101 (11 Jan 2019 09:06)  HR: 86 (11 Jan 2019 16:44) (74 - 136)  BP: 146/72 (11 Jan 2019 16:44) (130/47 - 186/65)  RR: 19 (11 Jan 2019 16:44) (14 - 36)  SpO2: 100% (11 Jan 2019 16:44) (97% - 100%)  Height (cm): 157.48 (01-11 @ 07:40)  Weight (kg): 59 (01-11 @ 07:40)  BMI (kg/m2): 23.8 (01-11 @ 07:40)  BSA (m2): 1.59 (01-11 @ 07:40)    General: nonverbal  Neck: supple, +trach collar  Lungs: Decreased no wheeze/rhonchi  Cardiovascular: regular rate and rhythm, S1 S2  Abdomen: soft, nontender, +PEG, bowel sounds normal  Neurological: awake nonverbal, Cranial Nerves II-XII grossly intact  Skin: no rash  Extremities: +edema +contracted    Labs:                         16.1   21.23 )-----------( 237      ( 11 Jan 2019 08:26 )             53.1   01-11    146<H>  |  103  |  31<H>  ----------------------------<  185<H>  4.7   |  21<L>  |  1.23    Ca    10.4      11 Jan 2019 08:26    RECENT CULTURES:  pending      RADIOLOGY & ADDITIONAL STUDIES:    EXAM:  XR CHEST PORTABLE ROUTINE 1V                            PROCEDURE DATE:  01/11/2019      INTERPRETATION:  Trach replacement.    AP chest. Prior 7/21/2017.  Limited. Patient's right hand obscures a large portion of the right lung   field.  Tracheostomy cannula in satisfactory position. Low lung volumes. No gross   infiltrates on this very limited study. No pleural collection. No change   heart mediastinum. Distended bowel in the visualized upper abdomen.    Impression: As above      Assessment :   73F advanced dementia, CVA, functional quadriplegia, trach collar, dysphagia with peg, nonverbal sent from [facility] with trach dislodged, replaced trach in ED, found to be febrile to 101 and WBC was 21K. Noted to lactate of 12. CXR clear and UA +.   Fever leukocytosis may be sec UTI though pt may have aspiration pneumonitis. Also noted to have distended bowel on CXR but exam is benign.      Plan :   Cont Zosyn  Fu cultures  May need CT AP  Trend temps and wbc  IVF      Continue with present regime .  Approptiate use of antibiotics and adverse effects reviewed.      I have discussed the above plan of care with patient/family in detail. They expressed understanding of the treatment plan . Risks, benefits and alternatives discussed in detail. I have asked if they have any questions or concerns and appropriately addressed them to the best of my ability .      Critical care > 45 minutes spent in direct patient care reviewing  the notes, lab data/ imaging , discussion with multidisciplinary team. All questions were addressed and answered to the best of my capacity .    Thank you for allowing me to participate in the care of your patient .      Eusebio Chairez MD  Infectious Disease  [phone]

## 2019-01-11 NOTE — ED PROCEDURE NOTE - PROCEDURE ADDITIONAL DETAILS
Pt with displaced trachea. Stoma visualized, 6Fr trachea attempted to be placed but unable, so 4F cuffed trachea inserted with introducer, then introducer removed and replaced with inner cannula, 5cc balloon insufflated, no air leak, trach connected to BVM with assisted ventilation with pulse ox return from 90% to 100% and respiratory rate from 40s down to 20s after procedure.  Pt then allowed spontaneous respirations through trach with trach collar oxygen at FiO2 40%.  Some post-insertion bleeding noted, minimal at this time.

## 2019-01-11 NOTE — H&P ADULT - HISTORY OF PRESENT ILLNESS
73F advanced dementia, stroke, functional quadriplegia, trach collar, dysphagia with peg, chronic constipation sent from SNF with trach dislodged.  As per family she was fine last night.  This morning trach came out and they were unable to put back in so they sent her to the ED.  In the ED she was found to be febrile, leukocytosis, elevated lactate, dehydration.   Patient is non-verbal.  Grunts in response to pain.       at bedside.  Does not have further family history.  Reports their children are alive and healthy. 73F advanced dementia, stroke, functional quadriplegia, trach collar, dysphagia with peg, chronic constipation sent from SNF with trach dislodged.  As per family she was fine last night.  This morning trach came out and they were unable to put back in so they sent her to the ED.    Ed attending replaced trach.  However, was only able to place a size 4.   Some bleeding noted after trach placement that seems to have stopped.  In the ED she was found to be febrile, leukocytosis, elevated lactate, dehydration.     Patient is non-verbal.  Grunts in response to pain.     at bedside.  Does not have further family history.  Reports their children are alive and healthy.

## 2019-01-11 NOTE — PROGRESS NOTE ADULT - SUBJECTIVE AND OBJECTIVE BOX
Patient is a 73y old  Female who presents with a chief complaint of   PAST MEDICAL & SURGICAL HISTORY:  Respiratory failure  Constipation  GERD (gastroesophageal reflux disease)  Hypertension  Respiratory failure  Diabetes mellitus  Aphasic stroke  Dementia of frontal lobe type  Tracheostomy in place  Gastrostomy in place  Hx of appendectomy    BREA BECKHAM   73y    Female    BRIEF HOSPITAL COURSE:    73F advanced dementia, stroke, functional quadriplegia, trach collar, dysphagia with peg, chronic constipation sent from SNF with a dislodged trach. Noted, leukocytosis, elevated lactate and urine +  lactate clearing         Review of Systems:  UATO                       Allergies      codeine (Hives)    Intolerances      Weight (kg): 59 (19 @ 07:40)  BMI (kg/m2): 23.8 (19 @ 07:40)    ICU Vital Signs Last 24 Hrs  T(C): 36.8 (2019 20:04), Max: 38.3 (2019 09:06)  T(F): 98.2 (2019 20:04), Max: 101 (2019 09:06)  HR: 87 (2019 20:23) (74 - 136)  BP: 134/67 (2019 20:23) (130/47 - 186/65)  BP(mean): 84 (2019 20:23) (78 - 84)  ABP: --  ABP(mean): --  RR: 19 (2019 20:23) (9 - 36)  SpO2: 100% (2019 20:23) (97% - 100%)    Physical Examination:    General:  NAD      HEENT:  trach    PULM: bilateral BS    CVS: s1 s2     ABD: Peg soft     EXT: no edema    SKIN: warm    Neuro:  Quad.      ABG - ( 2019 11:45 )  pH, Arterial: x     pH, Blood: 7.41  /  pCO2: 38    /  pO2: 162   / HCO3: 24    / Base Excess: -0.2  /  SaO2: 99          LABS:                        16.1   21.23 )-----------( 237      ( 2019 08:26 )             53.1         146<H>  |  103  |  31<H>  ----------------------------<  185<H>  4.7   |  21<L>  |  1.23    Ca    10.4      2019 08:26            CAPILLARY BLOOD GLUCOSE      POCT Blood Glucose.: 77 mg/dL (2019 18:36)  POCT Blood Glucose.: 97 mg/dL (2019 11:55)  POCT Blood Glucose.: 180 mg/dL (2019 08:07)      Urinalysis Basic - ( 2019 08:34 )    Color: Yellow / Appearance: Turbid / S.015 / pH: x  Gluc: x / Ketone: Trace  / Bili: Negative / Urobili: Negative mg/dL   Blood: x / Protein: 30 mg/dL / Nitrite: Negative   Leuk Esterase: Moderate / RBC: 3-5 /HPF / WBC 11-25   Sq Epi: x / Non Sq Epi: Occasional / Bacteria: Many      CULTURES:      Medications:  MEDICATIONS  (STANDING):  ALBUTerol    0.083% 2.5 milliGRAM(s) Nebulizer every 6 hours  artificial  tears Solution 1 Drop(s) Both EYES three times a day  dextrose 5%. 1000 milliLiter(s) (50 mL/Hr) IV Continuous <Continuous>  dextrose 50% Injectable 12.5 Gram(s) IV Push once  dextrose 50% Injectable 25 Gram(s) IV Push once  dextrose 50% Injectable 25 Gram(s) IV Push once  enoxaparin Injectable 30 milliGRAM(s) SubCutaneous daily  famotidine    Tablet 20 milliGRAM(s) Oral daily  fentaNYL   Patch  12 MICROgram(s)/Hr 1 Patch Transdermal every 72 hours  guaiFENesin   Syrup  (Sugar-Free) 200 milliGRAM(s) Oral every 6 hours  insulin glargine Injectable (LANTUS) 20 Unit(s) SubCutaneous at bedtime  insulin lispro (HumaLOG) corrective regimen sliding scale   SubCutaneous every 6 hours  piperacillin/tazobactam IVPB. 3.375 Gram(s) IV Intermittent every 8 hours  polyethylene glycol 3350 17 Gram(s) Oral daily  simethicone drops 60 milliGRAM(s) Oral two times a day  sodium chloride 0.65% Nasal 1 Spray(s) Both Nostrils four times a day  sodium chloride 0.9%. 1000 milliLiter(s) (100 mL/Hr) IV Continuous <Continuous>    MEDICATIONS  (PRN):  acetaminophen   Tablet .. 650 milliGRAM(s) Oral every 6 hours PRN Temp greater or equal to 38C (100.4F), Mild Pain (1 - 3)  dextrose 40% Gel 15 Gram(s) Oral once PRN Blood Glucose LESS THAN 70 milliGRAM(s)/deciliter  glucagon  Injectable 1 milliGRAM(s) IntraMuscular once PRN Glucose LESS THAN 70 milligrams/deciliter         @ 07:01  -   @ 21:06  --------------------------------------------------------  IN: 3690 mL / OUT: 300 mL / NET: 3390 mL        RADIOLOGY/IMAGING/ECHO    < from: Xray Chest 1 View- PORTABLE-Routine (19 @ 08:51) >  AP chest. Prior 2017.  Limited. Patient's right hand obscures a large portion of the right lung   field.  Tracheostomy cannula in satisfactory position. Low lung volumes. No gross   infiltrates on this very limited study. No pleural collection. No change   heart mediastinum. Distended bowel in the visualized upper abdomen.      Assessment/Plan:    73F advanced dementia, stroke, functional quadriplegia, trach collar, dysphagia with peg, chronic constipation sent from SNF with a dislodged trach. It was replaced with a #4.  Noted  leukocytosis, elevated lactate and a + urine.  Possible PNA Lactate clearing   HD stable  type B lactic acidosis as she is warm and perfused.      Sepsis (fever 101F,  leukocytosis, lactic acidosis)  Follow urine/blood  cultures    Zosyn for now

## 2019-01-11 NOTE — ED ADULT NURSE REASSESSMENT NOTE - NS ED NURSE REASSESS COMMENT FT1
pt incontinent of urine pt cleaned and changed awaiting spcu bed re positioned and HOB 45 degrees spouse at bedside

## 2019-01-12 DIAGNOSIS — J96.90 RESPIRATORY FAILURE, UNSPECIFIED, UNSPECIFIED WHETHER WITH HYPOXIA OR HYPERCAPNIA: ICD-10-CM

## 2019-01-12 LAB
-  COAGULASE NEGATIVE STAPHYLOCOCCUS: SIGNIFICANT CHANGE UP
ANION GAP SERPL CALC-SCNC: 6 MMOL/L — SIGNIFICANT CHANGE UP (ref 5–17)
BASOPHILS # BLD AUTO: 0.03 K/UL — SIGNIFICANT CHANGE UP (ref 0–0.2)
BASOPHILS NFR BLD AUTO: 0.4 % — SIGNIFICANT CHANGE UP (ref 0–2)
BUN SERPL-MCNC: 16 MG/DL — SIGNIFICANT CHANGE UP (ref 7–23)
CALCIUM SERPL-MCNC: 7.8 MG/DL — LOW (ref 8.4–10.5)
CHLORIDE SERPL-SCNC: 108 MMOL/L — SIGNIFICANT CHANGE UP (ref 96–108)
CO2 SERPL-SCNC: 26 MMOL/L — SIGNIFICANT CHANGE UP (ref 22–31)
CREAT SERPL-MCNC: 0.89 MG/DL — SIGNIFICANT CHANGE UP (ref 0.5–1.3)
CULTURE RESULTS: SIGNIFICANT CHANGE UP
EOSINOPHIL # BLD AUTO: 0.08 K/UL — SIGNIFICANT CHANGE UP (ref 0–0.5)
EOSINOPHIL NFR BLD AUTO: 1.1 % — SIGNIFICANT CHANGE UP (ref 0–6)
GLUCOSE BLDC GLUCOMTR-MCNC: 118 MG/DL — HIGH (ref 70–99)
GLUCOSE BLDC GLUCOMTR-MCNC: 140 MG/DL — HIGH (ref 70–99)
GLUCOSE BLDC GLUCOMTR-MCNC: 150 MG/DL — HIGH (ref 70–99)
GLUCOSE SERPL-MCNC: 162 MG/DL — HIGH (ref 70–99)
GRAM STN FLD: SIGNIFICANT CHANGE UP
HCT VFR BLD CALC: 36.6 % — SIGNIFICANT CHANGE UP (ref 34.5–45)
HGB BLD-MCNC: 11.6 G/DL — SIGNIFICANT CHANGE UP (ref 11.5–15.5)
IMM GRANULOCYTES NFR BLD AUTO: 0.4 % — SIGNIFICANT CHANGE UP (ref 0–1.5)
LYMPHOCYTES # BLD AUTO: 1.97 K/UL — SIGNIFICANT CHANGE UP (ref 1–3.3)
LYMPHOCYTES # BLD AUTO: 26.4 % — SIGNIFICANT CHANGE UP (ref 13–44)
MCHC RBC-ENTMCNC: 27.9 PG — SIGNIFICANT CHANGE UP (ref 27–34)
MCHC RBC-ENTMCNC: 31.7 GM/DL — LOW (ref 32–36)
MCV RBC AUTO: 88 FL — SIGNIFICANT CHANGE UP (ref 80–100)
METHOD TYPE: SIGNIFICANT CHANGE UP
MONOCYTES # BLD AUTO: 0.58 K/UL — SIGNIFICANT CHANGE UP (ref 0–0.9)
MONOCYTES NFR BLD AUTO: 7.8 % — SIGNIFICANT CHANGE UP (ref 2–14)
MRSA PCR RESULT.: SIGNIFICANT CHANGE UP
NEUTROPHILS # BLD AUTO: 4.78 K/UL — SIGNIFICANT CHANGE UP (ref 1.8–7.4)
NEUTROPHILS NFR BLD AUTO: 63.9 % — SIGNIFICANT CHANGE UP (ref 43–77)
NRBC # BLD: 0 /100 WBCS — SIGNIFICANT CHANGE UP (ref 0–0)
PLATELET # BLD AUTO: 153 K/UL — SIGNIFICANT CHANGE UP (ref 150–400)
POTASSIUM SERPL-MCNC: 3.7 MMOL/L — SIGNIFICANT CHANGE UP (ref 3.5–5.3)
POTASSIUM SERPL-SCNC: 3.7 MMOL/L — SIGNIFICANT CHANGE UP (ref 3.5–5.3)
RBC # BLD: 4.16 M/UL — SIGNIFICANT CHANGE UP (ref 3.8–5.2)
RBC # FLD: 15 % — HIGH (ref 10.3–14.5)
S AUREUS DNA NOSE QL NAA+PROBE: SIGNIFICANT CHANGE UP
SODIUM SERPL-SCNC: 140 MMOL/L — SIGNIFICANT CHANGE UP (ref 135–145)
SPECIMEN SOURCE: SIGNIFICANT CHANGE UP
SPECIMEN SOURCE: SIGNIFICANT CHANGE UP
WBC # BLD: 7.47 K/UL — SIGNIFICANT CHANGE UP (ref 3.8–10.5)
WBC # FLD AUTO: 7.47 K/UL — SIGNIFICANT CHANGE UP (ref 3.8–10.5)

## 2019-01-12 PROCEDURE — 71045 X-RAY EXAM CHEST 1 VIEW: CPT | Mod: 26

## 2019-01-12 PROCEDURE — 99233 SBSQ HOSP IP/OBS HIGH 50: CPT

## 2019-01-12 RX ORDER — VANCOMYCIN HCL 1 G
1000 VIAL (EA) INTRAVENOUS ONCE
Qty: 0 | Refills: 0 | Status: COMPLETED | OUTPATIENT
Start: 2019-01-12 | End: 2019-01-12

## 2019-01-12 RX ADMIN — Medication 200 MILLIGRAM(S): at 05:26

## 2019-01-12 RX ADMIN — ALBUTEROL 2.5 MILLIGRAM(S): 90 AEROSOL, METERED ORAL at 01:50

## 2019-01-12 RX ADMIN — Medication 1 DROP(S): at 13:11

## 2019-01-12 RX ADMIN — Medication 200 MILLIGRAM(S): at 12:30

## 2019-01-12 RX ADMIN — PIPERACILLIN AND TAZOBACTAM 25 GRAM(S): 4; .5 INJECTION, POWDER, LYOPHILIZED, FOR SOLUTION INTRAVENOUS at 17:48

## 2019-01-12 RX ADMIN — Medication 1 SPRAY(S): at 05:25

## 2019-01-12 RX ADMIN — FENTANYL CITRATE 1 PATCH: 50 INJECTION INTRAVENOUS at 18:53

## 2019-01-12 RX ADMIN — Medication 200 MILLIGRAM(S): at 17:53

## 2019-01-12 RX ADMIN — PIPERACILLIN AND TAZOBACTAM 25 GRAM(S): 4; .5 INJECTION, POWDER, LYOPHILIZED, FOR SOLUTION INTRAVENOUS at 00:50

## 2019-01-12 RX ADMIN — SIMETHICONE 60 MILLIGRAM(S): 80 TABLET, CHEWABLE ORAL at 05:26

## 2019-01-12 RX ADMIN — Medication 250 MILLIGRAM(S): at 15:08

## 2019-01-12 RX ADMIN — Medication 1 DROP(S): at 22:05

## 2019-01-12 RX ADMIN — Medication 1 SPRAY(S): at 12:31

## 2019-01-12 RX ADMIN — INSULIN GLARGINE 20 UNIT(S): 100 INJECTION, SOLUTION SUBCUTANEOUS at 22:06

## 2019-01-12 RX ADMIN — Medication 1 SPRAY(S): at 17:53

## 2019-01-12 RX ADMIN — ALBUTEROL 2.5 MILLIGRAM(S): 90 AEROSOL, METERED ORAL at 20:32

## 2019-01-12 RX ADMIN — FENTANYL CITRATE 1 PATCH: 50 INJECTION INTRAVENOUS at 06:40

## 2019-01-12 RX ADMIN — Medication 1 DROP(S): at 05:26

## 2019-01-12 RX ADMIN — PIPERACILLIN AND TAZOBACTAM 25 GRAM(S): 4; .5 INJECTION, POWDER, LYOPHILIZED, FOR SOLUTION INTRAVENOUS at 09:37

## 2019-01-12 RX ADMIN — ENOXAPARIN SODIUM 30 MILLIGRAM(S): 100 INJECTION SUBCUTANEOUS at 12:30

## 2019-01-12 RX ADMIN — SODIUM CHLORIDE 100 MILLILITER(S): 9 INJECTION INTRAMUSCULAR; INTRAVENOUS; SUBCUTANEOUS at 05:25

## 2019-01-12 RX ADMIN — SIMETHICONE 60 MILLIGRAM(S): 80 TABLET, CHEWABLE ORAL at 17:53

## 2019-01-12 RX ADMIN — ALBUTEROL 2.5 MILLIGRAM(S): 90 AEROSOL, METERED ORAL at 08:38

## 2019-01-12 RX ADMIN — FAMOTIDINE 20 MILLIGRAM(S): 10 INJECTION INTRAVENOUS at 12:30

## 2019-01-12 RX ADMIN — POLYETHYLENE GLYCOL 3350 17 GRAM(S): 17 POWDER, FOR SOLUTION ORAL at 12:30

## 2019-01-12 NOTE — DIETITIAN INITIAL EVALUATION ADULT. - OTHER INFO
Referral received for pt having been on enteral nutrition PTA. Pt c hx dementia, stroke, functional quadriplegia, trach collar, dysphagia with peg, chronic constipation sent from SNF with  dislodged trach. This was replaced in the ED but pt also found to have fever, leukocytosis, elevated lactate and dehydration. Pt found to have aspiration pna and UTI.  As per orders from [facility], pt  5'5 123# and was on glucerna 1.5 50ml/hr x 10 hrs for a total of 1000ml (1500kcal) Admit weight 125# and noted pressure ulcer L posterior neck stage II. No noted HgbA1C but pt was on humulin 18 units BID PTA. Current diet rx Glucerna 1.2 to goal 50ml/hr meets estimated needs. However, would add NC prosource once daily for added protein as pt c stage II p/u.

## 2019-01-12 NOTE — PROGRESS NOTE ADULT - SUBJECTIVE AND OBJECTIVE BOX
BREA BECKHAM is a 73yFemale , patient examined and chart reviewed. Patient being followed for     INTERVAL HPI/ OVERNIGHT EVENTS:  Afebrile. No events.    Past Medical History--  PAST MEDICAL & SURGICAL HISTORY:  Respiratory failure  Constipation  GERD (gastroesophageal reflux disease)  Hypertension  Respiratory failure  Diabetes mellitus  Aphasic stroke  Dementia of frontal lobe type  Tracheostomy in place  Gastrostomy in place  Hx of appendectomy      For details regarding the patient's social history, family history, and other miscellaneous elements, please refer the initial infectious diseases consultation and/or the admitting history and physical examination for this admission.      ROS:  Unable to obtain due to : Nonverbal    ALLERGIES:  codeine (Hives)      Current inpatient medications :    ANTIBIOTICS/RELEVANT:  piperacillin/tazobactam IVPB. 3.375 Gram(s) IV Intermittent every 8 hours      acetaminophen   Tablet .. 650 milliGRAM(s) Oral every 6 hours PRN  ALBUTerol    0.083% 2.5 milliGRAM(s) Nebulizer every 6 hours  artificial  tears Solution 1 Drop(s) Both EYES three times a day  dextrose 40% Gel 15 Gram(s) Oral once PRN  dextrose 5%. 1000 milliLiter(s) IV Continuous <Continuous>  dextrose 50% Injectable 12.5 Gram(s) IV Push once  dextrose 50% Injectable 25 Gram(s) IV Push once  dextrose 50% Injectable 25 Gram(s) IV Push once  enoxaparin Injectable 30 milliGRAM(s) SubCutaneous daily  famotidine    Tablet 20 milliGRAM(s) Oral daily  fentaNYL   Patch  12 MICROgram(s)/Hr 1 Patch Transdermal every 72 hours  glucagon  Injectable 1 milliGRAM(s) IntraMuscular once PRN  guaiFENesin   Syrup  (Sugar-Free) 200 milliGRAM(s) Oral every 6 hours  insulin glargine Injectable (LANTUS) 20 Unit(s) SubCutaneous at bedtime  insulin lispro (HumaLOG) corrective regimen sliding scale   SubCutaneous every 6 hours  polyethylene glycol 3350 17 Gram(s) Oral daily  simethicone drops 60 milliGRAM(s) Oral two times a day  sodium chloride 0.65% Nasal 1 Spray(s) Both Nostrils four times a day  sodium chloride 0.9%. 1000 milliLiter(s) IV Continuous <Continuous>      Objective:     @ 07:01  -  -12 @ 07:00  --------------------------------------------------------  IN: 5300 mL / OUT: 300 mL / NET: 5000 mL     @ 07:01  -  13 @ 00:04  --------------------------------------------------------  IN: 2390 mL / OUT: 0 mL / NET: 2390 mL      T(C): 37.1 (19 @ 20:01), Max: 37.1 (19 @ 16:01)  HR: 72 (19 @ 20:33) (62 - 83)  BP: 132/59 (19 @ 20:00) (96/49 - 132/59)  RR: 37 (19 20:00) (12 - 37)  SpO2: 98% (19 @ 20:33) (97% - 100%)  Wt(kg): --      Physical Exam:  GEN: NAD, Nonverbal chronically ill  HEENT: normocephalic and atraumatic. EOMI. MARKIE. Moist mucosa. Clear Posterior pharynx.  NECK: Supple. No carotid bruits.  Tra  LUNGS: Decreased to auscultation.  HEART: Regular rate and rhythm without murmur.  ABDOMEN: Soft, nontender, and nondistended.  Positive bowel sounds.  No hepatosplenomegaly was noted.  EXTREMITIES: Without any cyanosis, clubbing, rash, lesions or edema.  NEUROLOGIC: nonverbal  No focal neurological deficits   SKIN: No ulceration or induration present.      LABS:                        11.6   7.47  )-----------( 153      ( 2019 17:41 )             36.6           140  |  108  |  16  ----------------------------<  162<H>  3.7   |  26  |  0.89    Ca    7.8<L>      2019 17:41      Urinalysis Basic - ( 2019 08:34 )    Color: Yellow / Appearance: Turbid / S.015 / pH: x  Gluc: x / Ketone: Trace  / Bili: Negative / Urobili: Negative mg/dL   Blood: x / Protein: 30 mg/dL / Nitrite: Negative   Leuk Esterase: Moderate / RBC: 3-5 /HPF / WBC 11-25   Sq Epi: x / Non Sq Epi: Occasional / Bacteria: Many      ABG - ( 2019 11:45 )  pH, Arterial: x     pH, Blood: 7.41  /  pCO2: 38    /  pO2: 162   / HCO3: 24    / Base Excess: -0.2  /  SaO2: 99          MICROBIOLOGY:    Culture - Sputum (collected 2019 17:17)  Source: .Sputum Sputum  Gram Stain (2019 22:40):    Few polymorphonuclear leukocytes per low power field    No Squamous epithelial cells per low power field    Rare Gram Positive Rods per oil power field  Preliminary Report (2019 18:55):    Normal Respiratory Elvira present    Culture - Blood (collected 2019 12:12)  Source: .Blood Blood-Peripheral  Gram Stain (2019 09:25):    Growth in aerobic bottle: Gram Positive Cocci in Clusters  Preliminary Report (2019 09:25):    Growth in aerobic bottle: Gram Positive Cocci in Clusters    "Due to technical problems, Proteus sp. will Not be reported as part of    the BCID panel until further notice"    ***Blood Panel PCR results on this specimen are available    approximately 3 hours after the Gram stain result.***    Gram stain, PCR, and/or culture results may not always    correspond due to difference in methodologies.    ************************************************************    This PCR assay was performed using [employer].    The following targets are tested for: Enterococcus,    vancomycin resistant enterococci, Listeria monocytogenes,    coagulase negative staphylococci, S. aureus,    methicillin resistant S. aureus, Streptococcus agalactiae    (Group B), S. pneumoniae, S.pyogenes (Group A),    Acinetobacter baumannii, Enterobacter cloacae, E. coli,    Klebsiella oxytoca, K. pneumoniae, Proteus sp.,    Serratia marcescens, Haemophilus influenzae,    Neisseria meningitidis, Pseudomonas aeruginosa, Candida    albicans, C. glabrata, C krusei, C parapsilosis,    C. tropicalis and the KPC resistance gene.  Organism: Blood Culture PCR (2019 11:44)  Organism: Blood Culture PCR (2019 11:44)      -  Coagulase negative Staphylococcus: Detec      Method Type: PCR    Culture - Blood (collected 2019 12:12)  Source: .Blood Blood-Peripheral  Preliminary Report (2019 13:01):    No growth to date.    Culture - Urine (collected 2019 11:03)  Source: .Urine Clean Catch (Midstream)  Final Report (2019 12:59):    Culture grew 3 or more types of organisms which indicate    collection contamination; consider recollection only if clinically    indicated.      RADIOLOGY & ADDITIONAL STUDIES:      Assessment :  73F advanced dementia, CVA, functional quadriplegia, trach collar, dysphagia with peg, nonverbal sent from [facility] with trach dislodged, replaced trach in ED, found to be febrile to 101 and WBC was 21K. Noted to lactate of 12. CXR clear and UA +.   Fever leukocytosis may be sec UTI though pt may have aspiration pneumonitis.   cultures ngtd  Bacteremia with CNS contaminant  Leukocytosis resolved    Plan :   Cont Zosyn  Trend temps and wbc  IVF      Continue with present regiment.  Appropriate use of antibiotics and adverse effects reviewed.    I have discussed the above plan of care with patient/ family in detail. They expressed understanding of the the treatment plan . Risks, benefits and alternatives discussed in detail. I have asked if they have any questions or concerns and appropriately addressed them to the best of my ability .      Critical care time greater then 35 minutes reviewing notes, labs data/ imaging , discussion with multidisciplinary team.    Thank you for allowing me to participate in care of your patient .        Eusebio Chairez MD  Infectious Disease  [phone]

## 2019-01-12 NOTE — PROGRESS NOTE ADULT - PROBLEM SELECTOR PLAN 1
trach fixed, now tolerating trach collar  ENT to eval trach site prior to dc to SNF.  Dr Santos aware.   ABG acceptable.

## 2019-01-12 NOTE — PROGRESS NOTE ADULT - SUBJECTIVE AND OBJECTIVE BOX
Date/Time Patient Seen:  		  Referring MD:   Data Reviewed	       Patient is a 73y old  Female who presents with a chief complaint of dislodged trach (11 Jan 2019 21:05)      Subjective/HPI     PAST MEDICAL & SURGICAL HISTORY:  Respiratory failure  Constipation  GERD (gastroesophageal reflux disease)  Hypertension  Respiratory failure  Diabetes mellitus  Aphasic stroke  Dementia of frontal lobe type  Tracheostomy in place  Gastrostomy in place  Hx of appendectomy        Medication list         MEDICATIONS  (STANDING):  ALBUTerol    0.083% 2.5 milliGRAM(s) Nebulizer every 6 hours  artificial  tears Solution 1 Drop(s) Both EYES three times a day  dextrose 5%. 1000 milliLiter(s) (50 mL/Hr) IV Continuous <Continuous>  dextrose 50% Injectable 12.5 Gram(s) IV Push once  dextrose 50% Injectable 25 Gram(s) IV Push once  dextrose 50% Injectable 25 Gram(s) IV Push once  enoxaparin Injectable 30 milliGRAM(s) SubCutaneous daily  famotidine    Tablet 20 milliGRAM(s) Oral daily  fentaNYL   Patch  12 MICROgram(s)/Hr 1 Patch Transdermal every 72 hours  guaiFENesin   Syrup  (Sugar-Free) 200 milliGRAM(s) Oral every 6 hours  insulin glargine Injectable (LANTUS) 20 Unit(s) SubCutaneous at bedtime  insulin lispro (HumaLOG) corrective regimen sliding scale   SubCutaneous every 6 hours  piperacillin/tazobactam IVPB. 3.375 Gram(s) IV Intermittent every 8 hours  polyethylene glycol 3350 17 Gram(s) Oral daily  simethicone drops 60 milliGRAM(s) Oral two times a day  sodium chloride 0.65% Nasal 1 Spray(s) Both Nostrils four times a day  sodium chloride 0.9%. 1000 milliLiter(s) (100 mL/Hr) IV Continuous <Continuous>    MEDICATIONS  (PRN):  acetaminophen   Tablet .. 650 milliGRAM(s) Oral every 6 hours PRN Temp greater or equal to 38C (100.4F), Mild Pain (1 - 3)  dextrose 40% Gel 15 Gram(s) Oral once PRN Blood Glucose LESS THAN 70 milliGRAM(s)/deciliter  glucagon  Injectable 1 milliGRAM(s) IntraMuscular once PRN Glucose LESS THAN 70 milligrams/deciliter         Vitals log        ICU Vital Signs Last 24 Hrs  T(C): 37 (12 Jan 2019 04:04), Max: 38.3 (11 Jan 2019 09:06)  T(F): 98.6 (12 Jan 2019 04:04), Max: 101 (11 Jan 2019 09:06)  HR: 64 (12 Jan 2019 06:00) (64 - 136)  BP: 97/46 (12 Jan 2019 06:00) (96/49 - 186/65)  BP(mean): 62 (12 Jan 2019 06:00) (62 - 84)  ABP: --  ABP(mean): --  RR: 14 (12 Jan 2019 06:00) (9 - 36)  SpO2: 98% (12 Jan 2019 06:00) (94% - 100%)           Input and Output:  I&O's Detail    11 Jan 2019 07:01  -  12 Jan 2019 07:00  --------------------------------------------------------  IN:    Enteral Tube Flush: 300 mL    IV PiggyBack: 200 mL    ns in tub fed  [id number]: 300 mL    Sodium Chloride 0.9% IV Bolus: 2250 mL    sodium chloride 0.9%.: 1900 mL    Solution: 250 mL    Solution: 100 mL  Total IN: 5300 mL    OUT:    Voided: 300 mL  Total OUT: 300 mL    Total NET: 5000 mL          Lab Data                        16.1   21.23 )-----------( 237      ( 11 Jan 2019 08:26 )             53.1     01-11    146<H>  |  103  |  31<H>  ----------------------------<  185<H>  4.7   |  21<L>  |  1.23    Ca    10.4      11 Jan 2019 08:26      ABG - ( 11 Jan 2019 11:45 )  pH, Arterial: x     pH, Blood: 7.41  /  pCO2: 38    /  pO2: 162   / HCO3: 24    / Base Excess: -0.2  /  SaO2: 99                      Review of Systems	      Objective     Physical Examination    heart s1s2  lung dec BS  abd soft  trach in place  vent support in progress      Pertinent Lab findings & Imaging      Annalise:  NO   Adequate UO     I&O's Detail    11 Jan 2019 07:01  -  12 Jan 2019 07:00  --------------------------------------------------------  IN:    Enteral Tube Flush: 300 mL    IV PiggyBack: 200 mL    ns in tub fed  [id number]: 300 mL    Sodium Chloride 0.9% IV Bolus: 2250 mL    sodium chloride 0.9%.: 1900 mL    Solution: 250 mL    Solution: 100 mL  Total IN: 5300 mL    OUT:    Voided: 300 mL  Total OUT: 300 mL    Total NET: 5000 mL               Discussed with:     Cultures:	        Radiology

## 2019-01-12 NOTE — PROGRESS NOTE ADULT - SUBJECTIVE AND OBJECTIVE BOX
PULMONARY/CRITICAL CARE      INTERVAL HPI/OVERNIGHT EVENTS:  No sob. No fever. Tolerated trach collar. Opens eyes. Nonverbal.  73y FemaleHPI:  73F advanced dementia, stroke, functional quadriplegia, trach collar, dysphagia with peg, chronic constipation sent from SNF with trach dislodged.  As per family she was fine last night.  This morning trach came out and they were unable to put back in so they sent her to the ED.    Ed attending replaced trach.  However, was only able to place a size 4.   Some bleeding noted after trach placement that seems to have stopped.  In the ED she was found to be febrile, leukocytosis, elevated lactate, dehydration.     Patient is non-verbal.  Grunts in response to pain.     at bedside.  Does not have further family history.  Reports their children are alive and healthy. (2019 12:49)        PAST MEDICAL & SURGICAL HISTORY:  Respiratory failure  Constipation  GERD (gastroesophageal reflux disease)  Hypertension  Respiratory failure  Diabetes mellitus  Aphasic stroke  Dementia of frontal lobe type  Tracheostomy in place  Gastrostomy in place  Hx of appendectomy        ICU Vital Signs Last 24 Hrs  T(C): 37 (2019 04:04), Max: 38.3 (2019 09:06)  T(F): 98.6 (2019 04:04), Max: 101 (2019 09:06)  HR: 64 (2019 06:00) (64 - 124)  BP: 97/46 (2019 06:00) (96/49 - 146/72)  BP(mean): 62 (2019 06:00) (62 - 84)  ABP: --  ABP(mean): --  RR: 14 (2019 06:00) (9 - 19)  SpO2: 98% (2019 06:00) (94% - 100%)    Qtts:     I&O's Summary    2019 07:01  -  2019 07:00  --------------------------------------------------------  IN: 5300 mL / OUT: 300 mL / NET: 5000 mL            REVIEW OF SYSTEMS:    CONSTITUTIONAL: No fever, weight loss, or fatigue  BREASTS: No pain, masses, or nipple discharge  RESPIRATORY: No cough, wheezing, chills or hemoptysis; No shortness of breath  Has trach  CARDIOVASCULAR: No chest pain, palpitations, dizziness, or leg swelling  GASTROINTESTINAL: No abdominal or epigastric pain. No nausea, vomiting, or hematemesis; No diarrhea or constipation. No melena or hematochezia. Peg  GENITOURINARY: No dysuria, frequency, hematuria, or incontinence  NEURO: baseline very poor mental status      PHYSICAL EXAM:    GENERAL:thin female, opening eyes but unresponsive. No sob.  HEAD:  Atraumatic, Normocephalic  EYES: EOMI, PERRLA, conjunctiva and sclera clear eyes deviated to right  ENMT: No tonsillar erythema, exudates, or enlargement; Moist mucous membranes, Good dentition, No lesions  NECK: Supple, No JVD, Normal thyroid  NERVOUS SYSTEM: opens eyes, otherwise contracted and unresponsive  CHEST/LUNG: few bilat rhonchi, no wheezing, or rubs Trach intact on trach collar.  HEART: Regular rate and rhythm; No murmurs, rubs, or gallops  ABDOMEN: Soft, Nontender, Nondistended; Bowel sounds present  EXTREMITIES:  2+ Peripheral Pulses, No clubbing, cyanosis, or edema Contracted  LYMPH: No lymphadenopathy noted  SKIN: No rashes or lesions        LABS:                        16.1   21.23 )-----------( 237      ( 2019 08:26 )             53.1     01-11    146<H>  |  103  |  31<H>  ----------------------------<  185<H>  4.7   |  21<L>  |  1.23    Ca    10.4      2019 08:26        Urinalysis Basic - ( 2019 08:34 )    Color: Yellow / Appearance: Turbid / S.015 / pH: x  Gluc: x / Ketone: Trace  / Bili: Negative / Urobili: Negative mg/dL   Blood: x / Protein: 30 mg/dL / Nitrite: Negative   Leuk Esterase: Moderate / RBC: 3-5 /HPF / WBC 11-25   Sq Epi: x / Non Sq Epi: Occasional / Bacteria: Many      ABG - ( 2019 11:45 )  pH, Arterial: x     pH, Blood: 7.41  /  pCO2: 38    /  pO2: 162   / HCO3: 24    / Base Excess: -0.2  /  SaO2: 99                vanco through     RADIOLOGY & ADDITIONAL STUDIES: CXR OK

## 2019-01-12 NOTE — PROGRESS NOTE ADULT - ASSESSMENT
Pt with chronic trach due to CVA and poor mental status, dislodged trach but now presents with Sepsis, possible due to UTI vs aspiration..  Probably dehydrated.  Improved today, tolerating trach collar with good sats.   Unclear source of sepsis. Lactate coming down.  Palliative note apprec--wants all treatments.

## 2019-01-12 NOTE — PROGRESS NOTE ADULT - PROBLEM SELECTOR PLAN 1
chr resp failure  atelectasis  frail and weak  dysphagia  s/p trach change  overnight events noted  am labs pending  I and O monitored  on emp broad spectrum ABX  discussed prognosis and condition with   pt is well known to me from [facility] unit  will follow and monitor  prognosis guarded to poor  pt is full code, high risk for abrupt decompensation

## 2019-01-12 NOTE — PROGRESS NOTE ADULT - SUBJECTIVE AND OBJECTIVE BOX
Patient is a 73y old  Female who presents with a chief complaint of dislodged trach (2019 21:05)        HPI:  73F advanced dementia, stroke, functional quadriplegia, trach collar, dysphagia with peg, chronic constipation sent from SNF with trach dislodged.  As per family she was fine last night.  This morning trach came out and they were unable to put back in so they sent her to the ED.    Ed attending replaced trach.  However, was only able to place a size 4.   Some bleeding noted after trach placement that seems to have stopped.  In the ED she was found to be febrile, leukocytosis, elevated lactate, dehydration.     Patient is non-verbal.  Grunts in response to pain.     at bedside.  Does not have further family history.  Reports their children are alive and healthy. (2019 12:49)      SUBJECTIVE & OBJECTIVE: Pt seen and examined at bedside, no acute distress     PHYSICAL EXAM:  T(C): 36.8 (19 @ 08:43), Max: 37 (19 @ 18:48)  HR: 83 (19 @ 10:00) (64 - 87)  BP: 128/50 (19 @ 10:00) (96/49 - 146/72)  RR: 25 (19 @ 10:00) (9 - 25)  SpO2: 97% (19 @ 10:00) (94% - 100%)  Wt(kg): --   GENERAL: NAD, well-groomed, well-developed  EYES: EOMI, PERRLA, conjunctiva and sclera clear  ENMT: Moist mucous membranes  NECK: Supple, No JVD  NERVOUS SYSTEM:  Alert  but not communicative   CHEST/LUNG: trach +  HEART: Regular rate and rhythm; No murmurs, rubs, or gallops  ABDOMEN: Soft, Nontender, Nondistended; Bowel sounds present  EXTREMITIES:  2+ Peripheral Pulses, No clubbing, cyanosis, or edema        MEDICATIONS  (STANDING):  ALBUTerol    0.083% 2.5 milliGRAM(s) Nebulizer every 6 hours  artificial  tears Solution 1 Drop(s) Both EYES three times a day  dextrose 5%. 1000 milliLiter(s) (50 mL/Hr) IV Continuous <Continuous>  dextrose 50% Injectable 12.5 Gram(s) IV Push once  dextrose 50% Injectable 25 Gram(s) IV Push once  dextrose 50% Injectable 25 Gram(s) IV Push once  enoxaparin Injectable 30 milliGRAM(s) SubCutaneous daily  famotidine    Tablet 20 milliGRAM(s) Oral daily  fentaNYL   Patch  12 MICROgram(s)/Hr 1 Patch Transdermal every 72 hours  guaiFENesin   Syrup  (Sugar-Free) 200 milliGRAM(s) Oral every 6 hours  insulin glargine Injectable (LANTUS) 20 Unit(s) SubCutaneous at bedtime  insulin lispro (HumaLOG) corrective regimen sliding scale   SubCutaneous every 6 hours  piperacillin/tazobactam IVPB. 3.375 Gram(s) IV Intermittent every 8 hours  polyethylene glycol 3350 17 Gram(s) Oral daily  simethicone drops 60 milliGRAM(s) Oral two times a day  sodium chloride 0.65% Nasal 1 Spray(s) Both Nostrils four times a day  sodium chloride 0.9%. 1000 milliLiter(s) (100 mL/Hr) IV Continuous <Continuous>  vancomycin  IVPB 1000 milliGRAM(s) IV Intermittent once    MEDICATIONS  (PRN):  acetaminophen   Tablet .. 650 milliGRAM(s) Oral every 6 hours PRN Temp greater or equal to 38C (100.4F), Mild Pain (1 - 3)  dextrose 40% Gel 15 Gram(s) Oral once PRN Blood Glucose LESS THAN 70 milliGRAM(s)/deciliter  glucagon  Injectable 1 milliGRAM(s) IntraMuscular once PRN Glucose LESS THAN 70 milligrams/deciliter      LABS:                        16.1   21.23 )-----------( 237      ( 2019 08:26 )             53.1     01-11    146<H>  |  103  |  31<H>  ----------------------------<  185<H>  4.7   |  21<L>  |  1.23    Ca    10.4      2019 08:26        Urinalysis Basic - ( 2019 08:34 )    Color: Yellow / Appearance: Turbid / S.015 / pH: x  Gluc: x / Ketone: Trace  / Bili: Negative / Urobili: Negative mg/dL   Blood: x / Protein: 30 mg/dL / Nitrite: Negative   Leuk Esterase: Moderate / RBC: 3-5 /HPF / WBC 11-25   Sq Epi: x / Non Sq Epi: Occasional / Bacteria: Many        CAPILLARY BLOOD GLUCOSE      POCT Blood Glucose.: 150 mg/dL (2019 12:19)  POCT Blood Glucose.: 118 mg/dL (2019 06:09)  POCT Blood Glucose.: 92 mg/dL (2019 23:12)  POCT Blood Glucose.: 77 mg/dL (2019 18:36)      CAPILLARY BLOOD GLUCOSE      POCT Blood Glucose.: 150 mg/dL (2019 12:19)  POCT Blood Glucose.: 118 mg/dL (2019 06:09)  POCT Blood Glucose.: 92 mg/dL (2019 23:12)  POCT Blood Glucose.: 77 mg/dL (2019 18:36)    CAPILLARY BLOOD GLUCOSE      POCT Blood Glucose.: 150 mg/dL (2019 12:19)    ABG - ( 2019 11:45 )  pH, Arterial: x     pH, Blood: 7.41  /  pCO2: 38    /  pO2: 162   / HCO3: 24    / Base Excess: -0.2  /  SaO2: 99                      RECENT CULTURES:      RADIOLOGY & ADDITIONAL TESTS:                        DVT/GI ppx  Discussed with pt @ bedside

## 2019-01-12 NOTE — DIETITIAN INITIAL EVALUATION ADULT. - PROBLEM SELECTOR PLAN 1
trach fixed, now tolerating trach collar  ENT to eval trach site prior to dc to SNF.  Dr Sanots aware.   ABG acceptable.

## 2019-01-12 NOTE — PROGRESS NOTE ADULT - PROBLEM SELECTOR PLAN 2
Empiric Zosyn, follow up cultures, MRSA swab, urine legionella  BC postive for gram postive cocci<?contaimination? would give 1 time dose of iv vanco  repeated bc  ID consult Dr Chairez/pablo aware.   monitor lactate

## 2019-01-13 DIAGNOSIS — Z93.0 TRACHEOSTOMY STATUS: ICD-10-CM

## 2019-01-13 LAB
ALBUMIN SERPL ELPH-MCNC: 2.5 G/DL — LOW (ref 3.3–5)
ALP SERPL-CCNC: 77 U/L — SIGNIFICANT CHANGE UP (ref 30–120)
ALT FLD-CCNC: 36 U/L DA — SIGNIFICANT CHANGE UP (ref 10–60)
ANION GAP SERPL CALC-SCNC: 7 MMOL/L — SIGNIFICANT CHANGE UP (ref 5–17)
AST SERPL-CCNC: 39 U/L — SIGNIFICANT CHANGE UP (ref 10–40)
BILIRUB SERPL-MCNC: 0.8 MG/DL — SIGNIFICANT CHANGE UP (ref 0.2–1.2)
BUN SERPL-MCNC: 14 MG/DL — SIGNIFICANT CHANGE UP (ref 7–23)
CALCIUM SERPL-MCNC: 7.9 MG/DL — LOW (ref 8.4–10.5)
CHLORIDE SERPL-SCNC: 110 MMOL/L — HIGH (ref 96–108)
CO2 SERPL-SCNC: 26 MMOL/L — SIGNIFICANT CHANGE UP (ref 22–31)
CREAT SERPL-MCNC: 0.8 MG/DL — SIGNIFICANT CHANGE UP (ref 0.5–1.3)
CULTURE RESULTS: SIGNIFICANT CHANGE UP
GLUCOSE BLDC GLUCOMTR-MCNC: 125 MG/DL — HIGH (ref 70–99)
GLUCOSE BLDC GLUCOMTR-MCNC: 131 MG/DL — HIGH (ref 70–99)
GLUCOSE BLDC GLUCOMTR-MCNC: 131 MG/DL — HIGH (ref 70–99)
GLUCOSE BLDC GLUCOMTR-MCNC: 137 MG/DL — HIGH (ref 70–99)
GLUCOSE SERPL-MCNC: 117 MG/DL — HIGH (ref 70–99)
GRAM STN FLD: SIGNIFICANT CHANGE UP
GRAM STN FLD: SIGNIFICANT CHANGE UP
HCT VFR BLD CALC: 36.6 % — SIGNIFICANT CHANGE UP (ref 34.5–45)
HGB BLD-MCNC: 11.5 G/DL — SIGNIFICANT CHANGE UP (ref 11.5–15.5)
LACTATE SERPL-SCNC: 1.1 MMOL/L — SIGNIFICANT CHANGE UP (ref 0.7–2)
MCHC RBC-ENTMCNC: 26.9 PG — LOW (ref 27–34)
MCHC RBC-ENTMCNC: 31.4 GM/DL — LOW (ref 32–36)
MCV RBC AUTO: 85.5 FL — SIGNIFICANT CHANGE UP (ref 80–100)
NRBC # BLD: 0 /100 WBCS — SIGNIFICANT CHANGE UP (ref 0–0)
PLATELET # BLD AUTO: 161 K/UL — SIGNIFICANT CHANGE UP (ref 150–400)
POTASSIUM SERPL-MCNC: 3.8 MMOL/L — SIGNIFICANT CHANGE UP (ref 3.5–5.3)
POTASSIUM SERPL-SCNC: 3.8 MMOL/L — SIGNIFICANT CHANGE UP (ref 3.5–5.3)
PROT SERPL-MCNC: 5.6 G/DL — LOW (ref 6–8.3)
RBC # BLD: 4.28 M/UL — SIGNIFICANT CHANGE UP (ref 3.8–5.2)
RBC # FLD: 14.9 % — HIGH (ref 10.3–14.5)
SODIUM SERPL-SCNC: 143 MMOL/L — SIGNIFICANT CHANGE UP (ref 135–145)
SPECIMEN SOURCE: SIGNIFICANT CHANGE UP
WBC # BLD: 6.14 K/UL — SIGNIFICANT CHANGE UP (ref 3.8–10.5)
WBC # FLD AUTO: 6.14 K/UL — SIGNIFICANT CHANGE UP (ref 3.8–10.5)

## 2019-01-13 PROCEDURE — 99233 SBSQ HOSP IP/OBS HIGH 50: CPT

## 2019-01-13 RX ADMIN — POLYETHYLENE GLYCOL 3350 17 GRAM(S): 17 POWDER, FOR SOLUTION ORAL at 11:31

## 2019-01-13 RX ADMIN — Medication 200 MILLIGRAM(S): at 17:18

## 2019-01-13 RX ADMIN — PIPERACILLIN AND TAZOBACTAM 25 GRAM(S): 4; .5 INJECTION, POWDER, LYOPHILIZED, FOR SOLUTION INTRAVENOUS at 00:30

## 2019-01-13 RX ADMIN — FAMOTIDINE 20 MILLIGRAM(S): 10 INJECTION INTRAVENOUS at 11:31

## 2019-01-13 RX ADMIN — SIMETHICONE 60 MILLIGRAM(S): 80 TABLET, CHEWABLE ORAL at 17:18

## 2019-01-13 RX ADMIN — Medication 1 SPRAY(S): at 05:42

## 2019-01-13 RX ADMIN — PIPERACILLIN AND TAZOBACTAM 25 GRAM(S): 4; .5 INJECTION, POWDER, LYOPHILIZED, FOR SOLUTION INTRAVENOUS at 09:03

## 2019-01-13 RX ADMIN — FENTANYL CITRATE 1 PATCH: 50 INJECTION INTRAVENOUS at 20:21

## 2019-01-13 RX ADMIN — INSULIN GLARGINE 20 UNIT(S): 100 INJECTION, SOLUTION SUBCUTANEOUS at 21:30

## 2019-01-13 RX ADMIN — Medication 200 MILLIGRAM(S): at 11:30

## 2019-01-13 RX ADMIN — Medication 1 SPRAY(S): at 11:30

## 2019-01-13 RX ADMIN — Medication 200 MILLIGRAM(S): at 00:30

## 2019-01-13 RX ADMIN — Medication 200 MILLIGRAM(S): at 05:42

## 2019-01-13 RX ADMIN — Medication 1 SPRAY(S): at 00:30

## 2019-01-13 RX ADMIN — FENTANYL CITRATE 1 PATCH: 50 INJECTION INTRAVENOUS at 06:40

## 2019-01-13 RX ADMIN — SIMETHICONE 60 MILLIGRAM(S): 80 TABLET, CHEWABLE ORAL at 05:42

## 2019-01-13 RX ADMIN — SODIUM CHLORIDE 100 MILLILITER(S): 9 INJECTION INTRAMUSCULAR; INTRAVENOUS; SUBCUTANEOUS at 00:29

## 2019-01-13 RX ADMIN — ENOXAPARIN SODIUM 30 MILLIGRAM(S): 100 INJECTION SUBCUTANEOUS at 11:31

## 2019-01-13 RX ADMIN — SODIUM CHLORIDE 100 MILLILITER(S): 9 INJECTION INTRAMUSCULAR; INTRAVENOUS; SUBCUTANEOUS at 21:30

## 2019-01-13 RX ADMIN — Medication 1 DROP(S): at 21:31

## 2019-01-13 RX ADMIN — Medication 1 DROP(S): at 05:42

## 2019-01-13 RX ADMIN — ALBUTEROL 2.5 MILLIGRAM(S): 90 AEROSOL, METERED ORAL at 12:49

## 2019-01-13 RX ADMIN — Medication 1 DROP(S): at 13:56

## 2019-01-13 RX ADMIN — ALBUTEROL 2.5 MILLIGRAM(S): 90 AEROSOL, METERED ORAL at 01:17

## 2019-01-13 RX ADMIN — ALBUTEROL 2.5 MILLIGRAM(S): 90 AEROSOL, METERED ORAL at 07:37

## 2019-01-13 RX ADMIN — PIPERACILLIN AND TAZOBACTAM 25 GRAM(S): 4; .5 INJECTION, POWDER, LYOPHILIZED, FOR SOLUTION INTRAVENOUS at 17:17

## 2019-01-13 RX ADMIN — SODIUM CHLORIDE 100 MILLILITER(S): 9 INJECTION INTRAMUSCULAR; INTRAVENOUS; SUBCUTANEOUS at 10:18

## 2019-01-13 RX ADMIN — Medication 1 SPRAY(S): at 17:18

## 2019-01-13 RX ADMIN — ALBUTEROL 2.5 MILLIGRAM(S): 90 AEROSOL, METERED ORAL at 19:29

## 2019-01-13 NOTE — CONSULT NOTE ADULT - SUBJECTIVE AND OBJECTIVE BOX
Pt with long tterm in dwelling trach tube, neither resp or nursing familiar with hx, no family. has a feeding tube. has a #4 trach at this time, cuffed with cuff deflated. apparently had a larger diameter tube but it fell out and was replaced with the # 4. ventilating well with # 4. no obstruction

## 2019-01-13 NOTE — PROGRESS NOTE ADULT - ASSESSMENT
Pt with chronic trach due to CVA and poor mental status, dislodged trach but now presents with Sepsis, possible due to UTI vs aspiration..  Probably dehydrated.  Improved today, tolerating trach collar with good sats.   Unclear source of sepsis. Lactate coming down.  Overall improved, somewhat congested.  Palliative note apprec--wants all treatments.

## 2019-01-13 NOTE — PROGRESS NOTE ADULT - ASSESSMENT
73F advanced dementia, stroke, functional quadriplegia, trach collar, dysphagia with peg, chronic constipation sent from SNF with trach dislodged which was replaced by ED doc.  Found to have:  Fever and leukocytosis - probably due to  pneumonia which is either aspiration or pseudomonas (had in past)  elevated lactate - likely combination of septic shock and hypoxia      Problem/Plan - 1:  ·  Problem: Acute respiratory failure with hypoxia.  Plan: trach fixed, now tolerating trach collar  ENT eval: to be readmitted in the future for larger cuffless trach.      Problem/Plan - 2:  ·  Problem: Aspiration pneumonia of right lung, unspecified aspiration pneumonia type, unspecified part of lung.  Plan: Empiric Zosyn, follow up cultures, MRSA swab -ve, urine legionella pending  had BC postive for gram postive contamination sp 1 time dose of iv vanco  repeated bc   ID consult Dr Chairez/pablo re eval   monitor lactate.      Problem/Plan - 3:  ·  Problem: Type 2 diabetes mellitus with other specified complication, with long-term current use of insulin.  Plan: continue basal insulin.   FS checks q6h with lispro coverage.      Problem/Plan - 4:  ·  Problem: Gastroesophageal reflux disease, esophagitis presence not specified.  Plan: conitnue pepcid.      Problem/Plan - 5:  ·  Problem: Constipation, unspecified constipation type.  Plan: continue bowel regemin  did have loose BM in ED.      Problem/Plan - 6:  Problem: Prophylactic measure. Plan: IMPROVE VTE Individual Risk Assessment        RISK                                                          Points  [  ] Previous VTE                                                3  [  ] Thrombophilia                                             2  [ x ] Lower limb paralysis                                    2       (unable to hold up >15 seconds)    [  ] Current Cancer                                             2        (within 6 months)  [ x ] Immobilization > 24 hrs                              1  [ x ] ICU/CCU stay > 24 hours                            1  [ x ] Age > 60                                                    1  IMPROVE VTE Score ____5_____  High risk, will be on lovenox,.     Problem/Plan - 7:  ·  Problem: Advanced directives, counseling/discussion.   wishes for her to remain full code and wants all medical care possible.

## 2019-01-13 NOTE — PROGRESS NOTE ADULT - SUBJECTIVE AND OBJECTIVE BOX
Patient is a 73y old  Female who presents with a chief complaint of bleeding per trach (12 Jan 2019 13:29)    HPI:  73F advanced dementia, stroke, functional quadriplegia, trach collar, dysphagia with peg, chronic constipation sent from SNF with trach dislodged.  As per family she was fine last night.  This morning trach came out and they were unable to put back in so they sent her to the ED.    Ed attending replaced trach.  However, was only able to place a size 4.   Some bleeding noted after trach placement that seems to have stopped.  In the ED she was found to be febrile, leukocytosis, elevated lactate, dehydration.   Patient is non-verbal.  Grunts in response to pain.     at bedside.  Does not have further family history.  Reports their children are alive and healthy. (11 Jan 2019 12:49)    Today:  No acute events as per staff. pt can not make her needs known due to stroke/dementia    REVIEW OF SYSTEMS  can not asses    PAST MEDICAL & SURGICAL HISTORY:  Respiratory failure  Constipation  GERD (gastroesophageal reflux disease)  Hypertension  Respiratory failure  Diabetes mellitus  Aphasic stroke  Dementia of frontal lobe type  Tracheostomy in place  Gastrostomy in place  Hx of appendectomy    MEDICATIONS  (STANDING):  ALBUTerol    0.083% 2.5 milliGRAM(s) Nebulizer every 6 hours  artificial  tears Solution 1 Drop(s) Both EYES three times a day  dextrose 5%. 1000 milliLiter(s) (50 mL/Hr) IV Continuous <Continuous>  dextrose 50% Injectable 12.5 Gram(s) IV Push once  dextrose 50% Injectable 25 Gram(s) IV Push once  dextrose 50% Injectable 25 Gram(s) IV Push once  enoxaparin Injectable 30 milliGRAM(s) SubCutaneous daily  famotidine    Tablet 20 milliGRAM(s) Oral daily  fentaNYL   Patch  12 MICROgram(s)/Hr 1 Patch Transdermal every 72 hours  guaiFENesin   Syrup  (Sugar-Free) 200 milliGRAM(s) Oral every 6 hours  insulin glargine Injectable (LANTUS) 20 Unit(s) SubCutaneous at bedtime  insulin lispro (HumaLOG) corrective regimen sliding scale   SubCutaneous every 6 hours  piperacillin/tazobactam IVPB. 3.375 Gram(s) IV Intermittent every 8 hours  polyethylene glycol 3350 17 Gram(s) Oral daily  simethicone drops 60 milliGRAM(s) Oral two times a day  sodium chloride 0.65% Nasal 1 Spray(s) Both Nostrils four times a day  sodium chloride 0.9%. 1000 milliLiter(s) (100 mL/Hr) IV Continuous <Continuous>    MEDICATIONS  (PRN):  acetaminophen   Tablet .. 650 milliGRAM(s) Oral every 6 hours PRN Temp greater or equal to 38C (100.4F), Mild Pain (1 - 3)  dextrose 40% Gel 15 Gram(s) Oral once PRN Blood Glucose LESS THAN 70 milliGRAM(s)/deciliter  glucagon  Injectable 1 milliGRAM(s) IntraMuscular once PRN Glucose LESS THAN 70 milligrams/deciliter    EXAM:  Vital Signs Last 24 Hrs  T(C): 37 (14 Jan 2019 00:10), Max: 37 (14 Jan 2019 00:10)  T(F): 98.6 (14 Jan 2019 00:10), Max: 98.6 (14 Jan 2019 00:10)  HR: 72 (14 Jan 2019 02:53) (61 - 90)  BP: 143/74 (14 Jan 2019 02:53) (113/62 - 177/59)  BP(mean): 95 (14 Jan 2019 02:53) (78 - 95)  RR: 18 (14 Jan 2019 02:53) (11 - 29)  SpO2: 91% (14 Jan 2019 02:53) (91% - 100%)    01-12 @ 07:01 - 01-13 @ 07:00  --------------------------------------------------------  IN: 4590 mL / OUT: 0 mL / NET: 4590 mL    01-13 @ 07:01  -  01-14 @ 04:02  --------------------------------------------------------  IN: 525 mL / OUT: 0 mL / NET: 525 mL    PHYSICAL EXAM:  Constitutional: eyes open, laying in bed.   Neck: +trach on ventilator. no visible bleeding.   Respiratory: bilaterally clear to auscultation, no wheezing, no rhonchi, no crackles, no decreased air entry  Cardiovascular: s1s2, rrr, no murmurs.   Gastrointestinal: soft, non tender, +bowel sounds, no rebound, no guarding. d   Extremities: contracted upper extremities.  Neurological: can not asses  Skin: warm to touch.   Musculoskeletal: not moving extremities    LABS:  LIVER FUNCTIONS - ( 13 Jan 2019 07:13 )  Alb: 2.5 g/dL / Pro: 5.6 g/dL / ALK PHOS: 77 U/L / ALT: 36 U/L DA / AST: 39 U/L / GGT: x                                 11.5   6.14  )-----------( 161      ( 13 Jan 2019 07:10 )             36.6     01-13    143  |  110<H>  |  14  ----------------------------<  117<H>  3.8   |  26  |  0.80    Ca    7.9<L>      13 Jan 2019 07:13    TPro  5.6<L>  /  Alb  2.5<L>  /  TBili  0.8  /  DBili  x   /  AST  39  /  ALT  36  /  AlkPhos  77  01-13    Chart reviewed.   Labs reviewed.  Imaging reviewed.   Plan discussed with consultants.

## 2019-01-13 NOTE — CONSULT NOTE ADULT - ATTENDING COMMENTS
I examined pt, discussed pt with staff and authored this note. will discuss with primary tomorrow
Pt critically ill at high risk of death and deterioration.  Requires ICU care  Reviewed all xrays, notes and labs  Prognosis poor  To have palliative care eval  Should be DNR

## 2019-01-13 NOTE — PROVIDER CONTACT NOTE (CRITICAL VALUE NOTIFICATION) - SITUATION
1/11/19 Blood Cultures-preliminary report  + growth aerobic and anaerobic-gram positive cocci in clusters

## 2019-01-13 NOTE — PROGRESS NOTE ADULT - SUBJECTIVE AND OBJECTIVE BOX
PULMONARY/CRITICAL CARE      INTERVAL HPI/OVERNIGHT EVENTS:    73y FemaleHPI: Pt  unchanged, no sob. Somewhat congested. No fever.  73F advanced dementia, stroke, functional quadriplegia, trach collar, dysphagia with peg, chronic constipation sent from SNF with trach dislodged.  As per family she was fine last night.  This morning trach came out and they were unable to put back in so they sent her to the ED.    Ed attending replaced trach.  However, was only able to place a size 4.   Some bleeding noted after trach placement that seems to have stopped.  In the ED she was found to be febrile, leukocytosis, elevated lactate, dehydration.     Patient is non-verbal.  Grunts in response to pain.     at bedside.  Does not have further family history.  Reports their children are alive and healthy. (2019 12:49)        PAST MEDICAL & SURGICAL HISTORY:  Respiratory failure  Constipation  GERD (gastroesophageal reflux disease)  Hypertension  Respiratory failure  Diabetes mellitus  Aphasic stroke  Dementia of frontal lobe type  Tracheostomy in place  Gastrostomy in place  Hx of appendectomy        ICU Vital Signs Last 24 Hrs  T(C): 36.9 (2019 08:03), Max: 37.1 (2019 16:01)  T(F): 98.4 (2019 08:03), Max: 98.8 (2019 16:01)  HR: 81 (2019 06:00) (61 - 83)  BP: 156/68 (2019 06:00) (103/52 - 156/68)  BP(mean): 92 (2019 06:00) (67 - 92)  ABP: --  ABP(mean): --  RR: 24 (2019 06:00) (10 - 37)  SpO2: 99% (2019 06:00) (97% - 100%)    Qtts:     I&O's Summary    2019 07:01  -  2019 07:00  --------------------------------------------------------  IN: 4415 mL / OUT: 0 mL / NET: 4415 mL        REVIEW OF SYSTEMS:    CONSTITUTIONAL: No fever, weight loss, or fatigue  RESPIRATORY: No cough, wheezing, chills or hemoptysis; No shortness of breath  Has trach  CARDIOVASCULAR: No chest pain, palpitations, dizziness, or leg swelling  GASTROINTESTINAL: No abdominal or epigastric pain. No nausea, vomiting, or hematemesis; No diarrhea or constipation. No melena or hematochezia. Peg  NEURO: baseline very poor mental status      PHYSICAL EXAM:    GENERAL:thin female, opening eyes but unresponsive. No sob.  HEAD:  Atraumatic, Normocephalic  EYES: EOMI, PERRLA, conjunctiva and sclera clear eyes deviated to right  ENMT: No tonsillar erythema, exudates, or enlargement; Moist mucous membranes, Good dentition, No lesions  NECK: Supple, No JVD, Normal thyroid  NERVOUS SYSTEM: opens eyes, otherwise contracted and unresponsive  CHEST/LUNG: few bilat rhonchi, no wheezing, or rubs Trach intact on trach collar.  HEART: Regular rate and rhythm; No murmurs, rubs, or gallops  ABDOMEN: Soft, Nontender, Nondistended; Bowel sounds present  EXTREMITIES:  2+ Peripheral Pulses, No clubbing, cyanosis, or edema Contracted  LYMPH: No lymphadenopathy noted  SKIN: No rashes or lesions            LABS:                        11.5   6.14  )-----------( 161      ( 2019 07:10 )             36.6     01-12    140  |  108  |  16  ----------------------------<  162<H>  3.7   |  26  |  0.89    Ca    7.8<L>      2019 17:41        Urinalysis Basic - ( 2019 08:34 )    Color: Yellow / Appearance: Turbid / S.015 / pH: x  Gluc: x / Ketone: Trace  / Bili: Negative / Urobili: Negative mg/dL   Blood: x / Protein: 30 mg/dL / Nitrite: Negative   Leuk Esterase: Moderate / RBC: 3-5 /HPF / WBC 11-25   Sq Epi: x / Non Sq Epi: Occasional / Bacteria: Many      ABG - ( 2019 11:45 )  pH, Arterial: x     pH, Blood: 7.41  /  pCO2: 38    /  pO2: 162   / HCO3: 24    / Base Excess: -0.2  /  SaO2: 99                vanco through     RADIOLOGY & ADDITIONAL STUDIES:    < from: Xray Chest 1 View- PORTABLE-Routine (19 @ 07:18) >  EXAM:  XR CHEST PORTABLE ROUTINE 1V                                  PROCEDURE DATE:  2019          INTERPRETATION:  Clinical information: Shortness of breath. Questionable   pneumonia.    Technique: Frontal view of the chest.     Comparison:Prior chest examination from 2019.    Findings: The patient is status post tracheostomy. The patient's right   hand and arm obscure the right lung base limiting evaluation.    The lungs are grossly clear. The heart size is at the upper limits of  normal. There are mild multilevel degenerative changes of the thoracic   spine.    IMPRESSION: As above, no interval change.      < end of copied text >    CRITICAL CARE TIME SPENT:

## 2019-01-13 NOTE — PROGRESS NOTE ADULT - PROBLEM SELECTOR PLAN 1
chr resp failure  oral and skin care  nutrition  on emp ABX  serial labs, serial WBC  supportive medical regimen and ICU care  assist with ADL  prognosis poor, mental status unchanged  eventually planned for DC to Vent unit at [facility]  will follow  discussed with

## 2019-01-13 NOTE — PROGRESS NOTE ADULT - SUBJECTIVE AND OBJECTIVE BOX
Date/Time Patient Seen:  		  Referring MD:   Data Reviewed	       Patient is a 73y old  Female who presents with a chief complaint of bleeding per trach (12 Jan 2019 13:29)      Subjective/HPI     PAST MEDICAL & SURGICAL HISTORY:  Respiratory failure  Constipation  GERD (gastroesophageal reflux disease)  Hypertension  Respiratory failure  Diabetes mellitus  Aphasic stroke  Dementia of frontal lobe type  Tracheostomy in place  Gastrostomy in place  Hx of appendectomy        Medication list         MEDICATIONS  (STANDING):  ALBUTerol    0.083% 2.5 milliGRAM(s) Nebulizer every 6 hours  artificial  tears Solution 1 Drop(s) Both EYES three times a day  dextrose 5%. 1000 milliLiter(s) (50 mL/Hr) IV Continuous <Continuous>  dextrose 50% Injectable 12.5 Gram(s) IV Push once  dextrose 50% Injectable 25 Gram(s) IV Push once  dextrose 50% Injectable 25 Gram(s) IV Push once  enoxaparin Injectable 30 milliGRAM(s) SubCutaneous daily  famotidine    Tablet 20 milliGRAM(s) Oral daily  fentaNYL   Patch  12 MICROgram(s)/Hr 1 Patch Transdermal every 72 hours  guaiFENesin   Syrup  (Sugar-Free) 200 milliGRAM(s) Oral every 6 hours  insulin glargine Injectable (LANTUS) 20 Unit(s) SubCutaneous at bedtime  insulin lispro (HumaLOG) corrective regimen sliding scale   SubCutaneous every 6 hours  piperacillin/tazobactam IVPB. 3.375 Gram(s) IV Intermittent every 8 hours  polyethylene glycol 3350 17 Gram(s) Oral daily  simethicone drops 60 milliGRAM(s) Oral two times a day  sodium chloride 0.65% Nasal 1 Spray(s) Both Nostrils four times a day  sodium chloride 0.9%. 1000 milliLiter(s) (100 mL/Hr) IV Continuous <Continuous>    MEDICATIONS  (PRN):  acetaminophen   Tablet .. 650 milliGRAM(s) Oral every 6 hours PRN Temp greater or equal to 38C (100.4F), Mild Pain (1 - 3)  dextrose 40% Gel 15 Gram(s) Oral once PRN Blood Glucose LESS THAN 70 milliGRAM(s)/deciliter  glucagon  Injectable 1 milliGRAM(s) IntraMuscular once PRN Glucose LESS THAN 70 milligrams/deciliter         Vitals log        ICU Vital Signs Last 24 Hrs  T(C): 36.9 (13 Jan 2019 04:16), Max: 37.1 (12 Jan 2019 16:01)  T(F): 98.4 (13 Jan 2019 04:16), Max: 98.8 (12 Jan 2019 16:01)  HR: 81 (13 Jan 2019 06:00) (61 - 83)  BP: 156/68 (13 Jan 2019 06:00) (103/52 - 156/68)  BP(mean): 92 (13 Jan 2019 06:00) (67 - 92)  ABP: --  ABP(mean): --  RR: 24 (13 Jan 2019 06:00) (10 - 37)  SpO2: 99% (13 Jan 2019 06:00) (97% - 100%)           Input and Output:  I&O's Detail    12 Jan 2019 07:01  -  13 Jan 2019 07:00  --------------------------------------------------------  IN:    Enteral Tube Flush: 575 mL    IV PiggyBack: 100 mL    ns in tub fed  [id number]: 1090 mL    sodium chloride 0.9%.: 2200 mL    Solution: 250 mL    Solution: 200 mL  Total IN: 4415 mL    OUT:  Total OUT: 0 mL    Total NET: 4415 mL          Lab Data                        11.6   7.47  )-----------( 153      ( 12 Jan 2019 17:41 )             36.6     01-12    140  |  108  |  16  ----------------------------<  162<H>  3.7   |  26  |  0.89    Ca    7.8<L>      12 Jan 2019 17:41      ABG - ( 11 Jan 2019 11:45 )  pH, Arterial: x     pH, Blood: 7.41  /  pCO2: 38    /  pO2: 162   / HCO3: 24    / Base Excess: -0.2  /  SaO2: 99                      Review of Systems	      Objective     Physical Examination    heart s1s2  lung dec BS    Pertinent Lab findings & Imaging      Annalise:  NO   Adequate UO     I&O's Detail    12 Jan 2019 07:01  -  13 Jan 2019 07:00  --------------------------------------------------------  IN:    Enteral Tube Flush: 575 mL    IV PiggyBack: 100 mL    ns in tub fed  [id number]: 1090 mL    sodium chloride 0.9%.: 2200 mL    Solution: 250 mL    Solution: 200 mL  Total IN: 4415 mL    OUT:  Total OUT: 0 mL    Total NET: 4415 mL               Discussed with:     Cultures:	        Radiology

## 2019-01-14 LAB
ANION GAP SERPL CALC-SCNC: 8 MMOL/L — SIGNIFICANT CHANGE UP (ref 5–17)
BUN SERPL-MCNC: 10 MG/DL — SIGNIFICANT CHANGE UP (ref 7–23)
CALCIUM SERPL-MCNC: 8.4 MG/DL — SIGNIFICANT CHANGE UP (ref 8.4–10.5)
CHLORIDE SERPL-SCNC: 108 MMOL/L — SIGNIFICANT CHANGE UP (ref 96–108)
CO2 SERPL-SCNC: 27 MMOL/L — SIGNIFICANT CHANGE UP (ref 22–31)
CREAT SERPL-MCNC: 0.83 MG/DL — SIGNIFICANT CHANGE UP (ref 0.5–1.3)
CULTURE RESULTS: SIGNIFICANT CHANGE UP
GLUCOSE BLDC GLUCOMTR-MCNC: 129 MG/DL — HIGH (ref 70–99)
GLUCOSE BLDC GLUCOMTR-MCNC: 136 MG/DL — HIGH (ref 70–99)
GLUCOSE BLDC GLUCOMTR-MCNC: 137 MG/DL — HIGH (ref 70–99)
GLUCOSE BLDC GLUCOMTR-MCNC: 140 MG/DL — HIGH (ref 70–99)
GLUCOSE BLDC GLUCOMTR-MCNC: 144 MG/DL — HIGH (ref 70–99)
GLUCOSE SERPL-MCNC: 133 MG/DL — HIGH (ref 70–99)
HCT VFR BLD CALC: 37.2 % — SIGNIFICANT CHANGE UP (ref 34.5–45)
HGB BLD-MCNC: 11.8 G/DL — SIGNIFICANT CHANGE UP (ref 11.5–15.5)
MCHC RBC-ENTMCNC: 27.4 PG — SIGNIFICANT CHANGE UP (ref 27–34)
MCHC RBC-ENTMCNC: 31.7 GM/DL — LOW (ref 32–36)
MCV RBC AUTO: 86.3 FL — SIGNIFICANT CHANGE UP (ref 80–100)
NRBC # BLD: 0 /100 WBCS — SIGNIFICANT CHANGE UP (ref 0–0)
ORGANISM # SPEC MICROSCOPIC CNT: SIGNIFICANT CHANGE UP
ORGANISM # SPEC MICROSCOPIC CNT: SIGNIFICANT CHANGE UP
PLATELET # BLD AUTO: 178 K/UL — SIGNIFICANT CHANGE UP (ref 150–400)
POTASSIUM SERPL-MCNC: 3.6 MMOL/L — SIGNIFICANT CHANGE UP (ref 3.5–5.3)
POTASSIUM SERPL-SCNC: 3.6 MMOL/L — SIGNIFICANT CHANGE UP (ref 3.5–5.3)
RBC # BLD: 4.31 M/UL — SIGNIFICANT CHANGE UP (ref 3.8–5.2)
RBC # FLD: 14.8 % — HIGH (ref 10.3–14.5)
SODIUM SERPL-SCNC: 143 MMOL/L — SIGNIFICANT CHANGE UP (ref 135–145)
SPECIMEN SOURCE: SIGNIFICANT CHANGE UP
WBC # BLD: 6.61 K/UL — SIGNIFICANT CHANGE UP (ref 3.8–10.5)
WBC # FLD AUTO: 6.61 K/UL — SIGNIFICANT CHANGE UP (ref 3.8–10.5)

## 2019-01-14 PROCEDURE — 99233 SBSQ HOSP IP/OBS HIGH 50: CPT

## 2019-01-14 RX ADMIN — Medication 200 MILLIGRAM(S): at 00:37

## 2019-01-14 RX ADMIN — FENTANYL CITRATE 1 PATCH: 50 INJECTION INTRAVENOUS at 10:48

## 2019-01-14 RX ADMIN — Medication 1 SPRAY(S): at 11:15

## 2019-01-14 RX ADMIN — Medication 200 MILLIGRAM(S): at 06:01

## 2019-01-14 RX ADMIN — FENTANYL CITRATE 1 PATCH: 50 INJECTION INTRAVENOUS at 08:13

## 2019-01-14 RX ADMIN — PIPERACILLIN AND TAZOBACTAM 25 GRAM(S): 4; .5 INJECTION, POWDER, LYOPHILIZED, FOR SOLUTION INTRAVENOUS at 00:38

## 2019-01-14 RX ADMIN — SIMETHICONE 60 MILLIGRAM(S): 80 TABLET, CHEWABLE ORAL at 06:01

## 2019-01-14 RX ADMIN — Medication 1 DROP(S): at 21:10

## 2019-01-14 RX ADMIN — Medication 1 DROP(S): at 14:23

## 2019-01-14 RX ADMIN — ALBUTEROL 2.5 MILLIGRAM(S): 90 AEROSOL, METERED ORAL at 01:12

## 2019-01-14 RX ADMIN — ALBUTEROL 2.5 MILLIGRAM(S): 90 AEROSOL, METERED ORAL at 19:15

## 2019-01-14 RX ADMIN — POLYETHYLENE GLYCOL 3350 17 GRAM(S): 17 POWDER, FOR SOLUTION ORAL at 11:14

## 2019-01-14 RX ADMIN — Medication 1 SPRAY(S): at 18:25

## 2019-01-14 RX ADMIN — FENTANYL CITRATE 1 PATCH: 50 INJECTION INTRAVENOUS at 19:08

## 2019-01-14 RX ADMIN — ALBUTEROL 2.5 MILLIGRAM(S): 90 AEROSOL, METERED ORAL at 08:05

## 2019-01-14 RX ADMIN — Medication 1 SPRAY(S): at 00:38

## 2019-01-14 RX ADMIN — INSULIN GLARGINE 20 UNIT(S): 100 INJECTION, SOLUTION SUBCUTANEOUS at 23:15

## 2019-01-14 RX ADMIN — Medication 1 SPRAY(S): at 23:16

## 2019-01-14 RX ADMIN — FAMOTIDINE 20 MILLIGRAM(S): 10 INJECTION INTRAVENOUS at 11:14

## 2019-01-14 RX ADMIN — Medication 200 MILLIGRAM(S): at 11:15

## 2019-01-14 RX ADMIN — ALBUTEROL 2.5 MILLIGRAM(S): 90 AEROSOL, METERED ORAL at 13:38

## 2019-01-14 RX ADMIN — Medication 1 DROP(S): at 06:01

## 2019-01-14 RX ADMIN — SIMETHICONE 60 MILLIGRAM(S): 80 TABLET, CHEWABLE ORAL at 18:25

## 2019-01-14 RX ADMIN — ENOXAPARIN SODIUM 30 MILLIGRAM(S): 100 INJECTION SUBCUTANEOUS at 11:14

## 2019-01-14 RX ADMIN — Medication 1 SPRAY(S): at 06:01

## 2019-01-14 NOTE — PROGRESS NOTE ADULT - SUBJECTIVE AND OBJECTIVE BOX
PULMONARY/CRITICAL CARE      INTERVAL HPI/OVERNIGHT EVENTS:    73y FemaleHPI: No sob, chest pain. No fever.  73F advanced dementia, stroke, functional quadriplegia, trach collar, dysphagia with peg, chronic constipation sent from SNF with trach dislodged.  As per family she was fine last night.  This morning trach came out and they were unable to put back in so they sent her to the ED.    Ed attending replaced trach.  However, was only able to place a size 4.   Some bleeding noted after trach placement that seems to have stopped.  In the ED she was found to be febrile, leukocytosis, elevated lactate, dehydration.     Patient is non-verbal.  Grunts in response to pain.     at bedside.  Does not have further family history.  Reports their children are alive and healthy. (11 Jan 2019 12:49)        PAST MEDICAL & SURGICAL HISTORY:  Respiratory failure  Constipation  GERD (gastroesophageal reflux disease)  Hypertension  Respiratory failure  Diabetes mellitus  Aphasic stroke  Dementia of frontal lobe type  Tracheostomy in place  Gastrostomy in place  Hx of appendectomy        ICU Vital Signs Last 24 Hrs  T(C): 36.8 (14 Jan 2019 04:05), Max: 37 (14 Jan 2019 00:10)  T(F): 98.2 (14 Jan 2019 04:05), Max: 98.6 (14 Jan 2019 00:10)  HR: 70 (14 Jan 2019 08:05) (0 - 90)  BP: 120/51 (14 Jan 2019 08:00) (113/62 - 177/59)  BP(mean): 83 (14 Jan 2019 08:00) (76 - 95)  ABP: --  ABP(mean): --  RR: 22 (14 Jan 2019 08:00) (11 - 29)  SpO2: 98% (14 Jan 2019 08:05) (91% - 100%)    Qtts:     I&O's Summary    13 Jan 2019 07:01  -  14 Jan 2019 07:00  --------------------------------------------------------  IN: 2725 mL / OUT: 0 mL / NET: 2725 mL        REVIEW OF SYSTEMS:    CONSTITUTIONAL: No fever, weight loss, or fatigue  RESPIRATORY: No cough, wheezing, chills or hemoptysis; No shortness of breath  Has trach  CARDIOVASCULAR: No chest pain, palpitations, dizziness, or leg swelling  GASTROINTESTINAL: No abdominal or epigastric pain. No nausea, vomiting, or hematemesis; No diarrhea or constipation. No melena or hematochezia. Peg  NEURO: baseline very poor mental status      PHYSICAL EXAM:    GENERAL:thin female, opening eyes but unresponsive. No sob.  HEAD:  Atraumatic, Normocephalic  EYES: EOMI, PERRLA, conjunctiva and sclera clear eyes deviated to right  ENMT: No tonsillar erythema, exudates, or enlargement; Moist mucous membranes, Good dentition, No lesions  NECK: Supple, No JVD, Normal thyroid  NERVOUS SYSTEM: opens eyes, otherwise contracted and unresponsive  CHEST/LUNG: few bilat rhonchi, no wheezing, or rubs Trach intact on trach collar.  HEART: Regular rate and rhythm; No murmurs, rubs, or gallops  ABDOMEN: Soft, Nontender, Nondistended; Bowel sounds present  EXTREMITIES:  2+ Peripheral Pulses, No clubbing, cyanosis, or edema Contracted  LYMPH: No lymphadenopathy noted  SKIN: No rashes or lesions                LABS:                        11.8   6.61  )-----------( 178      ( 14 Jan 2019 06:48 )             37.2     01-14    143  |  108  |  10  ----------------------------<  133<H>  3.6   |  27  |  0.83    Ca    8.4      14 Jan 2019 06:48    TPro  5.6<L>  /  Alb  2.5<L>  /  TBili  0.8  /  DBili  x   /  AST  39  /  ALT  36  /  AlkPhos  77  01-13          vanco through     RADIOLOGY & ADDITIONAL STUDIES:  CXR ok    CRITICAL CARE TIME SPENT:

## 2019-01-14 NOTE — PROGRESS NOTE ADULT - SUBJECTIVE AND OBJECTIVE BOX
Date/Time Patient Seen:  		  Referring MD:   Data Reviewed	       Patient is a 73y old  Female who presents with a chief complaint of dislodged trach (13 Jan 2019 04:01)      Subjective/HPI     PAST MEDICAL & SURGICAL HISTORY:  Respiratory failure  Constipation  GERD (gastroesophageal reflux disease)  Hypertension  Respiratory failure  Diabetes mellitus  Aphasic stroke  Dementia of frontal lobe type  Tracheostomy in place  Gastrostomy in place  Hx of appendectomy        Medication list         MEDICATIONS  (STANDING):  ALBUTerol    0.083% 2.5 milliGRAM(s) Nebulizer every 6 hours  artificial  tears Solution 1 Drop(s) Both EYES three times a day  dextrose 5%. 1000 milliLiter(s) (50 mL/Hr) IV Continuous <Continuous>  dextrose 50% Injectable 12.5 Gram(s) IV Push once  dextrose 50% Injectable 25 Gram(s) IV Push once  dextrose 50% Injectable 25 Gram(s) IV Push once  enoxaparin Injectable 30 milliGRAM(s) SubCutaneous daily  famotidine    Tablet 20 milliGRAM(s) Oral daily  fentaNYL   Patch  12 MICROgram(s)/Hr 1 Patch Transdermal every 72 hours  guaiFENesin   Syrup  (Sugar-Free) 200 milliGRAM(s) Oral every 6 hours  insulin glargine Injectable (LANTUS) 20 Unit(s) SubCutaneous at bedtime  insulin lispro (HumaLOG) corrective regimen sliding scale   SubCutaneous every 6 hours  piperacillin/tazobactam IVPB. 3.375 Gram(s) IV Intermittent every 8 hours  polyethylene glycol 3350 17 Gram(s) Oral daily  simethicone drops 60 milliGRAM(s) Oral two times a day  sodium chloride 0.65% Nasal 1 Spray(s) Both Nostrils four times a day  sodium chloride 0.9%. 1000 milliLiter(s) (100 mL/Hr) IV Continuous <Continuous>    MEDICATIONS  (PRN):  acetaminophen   Tablet .. 650 milliGRAM(s) Oral every 6 hours PRN Temp greater or equal to 38C (100.4F), Mild Pain (1 - 3)  dextrose 40% Gel 15 Gram(s) Oral once PRN Blood Glucose LESS THAN 70 milliGRAM(s)/deciliter  glucagon  Injectable 1 milliGRAM(s) IntraMuscular once PRN Glucose LESS THAN 70 milligrams/deciliter         Vitals log        ICU Vital Signs Last 24 Hrs  T(C): 36.8 (14 Jan 2019 04:05), Max: 37 (14 Jan 2019 00:10)  T(F): 98.2 (14 Jan 2019 04:05), Max: 98.6 (14 Jan 2019 00:10)  HR: 0 (14 Jan 2019 06:00) (0 - 90)  BP: 138/77 (14 Jan 2019 06:00) (113/62 - 177/59)  BP(mean): 94 (14 Jan 2019 06:00) (76 - 95)  ABP: --  ABP(mean): --  RR: 21 (14 Jan 2019 06:00) (11 - 29)  SpO2: 96% (14 Jan 2019 06:00) (91% - 100%)           Input and Output:  I&O's Detail    13 Jan 2019 07:01  -  14 Jan 2019 07:00  --------------------------------------------------------  IN:    Enteral Tube Flush: 375 mL    IV PiggyBack: 100 mL    ns in tub fed  [id number]: 750 mL    sodium chloride 0.9%.: 1500 mL  Total IN: 2725 mL    OUT:  Total OUT: 0 mL    Total NET: 2725 mL          Lab Data                        11.8   6.61  )-----------( 178      ( 14 Jan 2019 06:48 )             37.2     01-14    143  |  108  |  10  ----------------------------<  133<H>  3.6   |  27  |  0.83    Ca    8.4      14 Jan 2019 06:48    TPro  5.6<L>  /  Alb  2.5<L>  /  TBili  0.8  /  DBili  x   /  AST  39  /  ALT  36  /  AlkPhos  77  01-13            Review of Systems	      Objective     Physical Examination    heart s1s2  lung dec BS  abd soft      Pertinent Lab findings & Imaging      Annalise:  NO   Adequate UO     I&O's Detail    13 Jan 2019 07:01  -  14 Jan 2019 07:00  --------------------------------------------------------  IN:    Enteral Tube Flush: 375 mL    IV PiggyBack: 100 mL    ns in tub fed  [id number]: 750 mL    sodium chloride 0.9%.: 1500 mL  Total IN: 2725 mL    OUT:  Total OUT: 0 mL    Total NET: 2725 mL               Discussed with:     Cultures:	        Radiology

## 2019-01-14 NOTE — PROGRESS NOTE ADULT - ASSESSMENT
73F advanced dementia, stroke, functional quadriplegia, trach collar, dysphagia with peg, chronic constipation sent from SNF with trach dislodged which was replaced by ED doc.  Found to have:  Fever and leukocytosis - probably due to  pneumonia which is either aspiration or pseudomonas (had in past)  elevated lactate - likely combination of septic shock and hypoxia      Problem/Plan - 1:  ·  Problem: Acute respiratory failure with hypoxia.  Plan: trach fixed, now tolerating trach collar  ENT eval: to be readmitted in the future for larger cuffless trach.      Problem/Plan - 2:  ·  Problem: Aspiration pneumonia of right lung, unspecified aspiration pneumonia type, unspecified part of lung.  Plan: Empiric Zosyn, follow up cultures, MRSA swab -ve, urine legionella pending  had BC postive for gram postive contamination sp 1 time dose of iv vanco  repeated bc   ID consult Dr Chairez/pablo re eval   monitor lactate.      Problem/Plan - 3:  ·  Problem: Type 2 diabetes mellitus with other specified complication, with long-term current use of insulin.  Plan: continue basal insulin.   FS checks q6h with lispro coverage.      Problem/Plan - 4:  ·  Problem: Gastroesophageal reflux disease, esophagitis presence not specified.  Plan: conitnue pepcid.      Problem/Plan - 5:  ·  Problem: Constipation, unspecified constipation type.  Plan: continue bowel regemin  did have loose BM in ED.      Problem/Plan - 6:  Problem: Prophylactic measure. Plan: IMPROVE VTE Individual Risk Assessment        RISK                                                          Points  [  ] Previous VTE                                                3  [  ] Thrombophilia                                             2  [ x ] Lower limb paralysis                                    2       (unable to hold up >15 seconds)    [  ] Current Cancer                                             2        (within 6 months)  [ x ] Immobilization > 24 hrs                              1  [ x ] ICU/CCU stay > 24 hours                            1  [ x ] Age > 60                                                    1  IMPROVE VTE Score ____5_____  High risk, will be on lovenox,.     Problem/Plan - 7:  ·  Problem: Advanced directives, counseling/discussion.   wishes for her to remain full code and wants all medical care possible. 73F advanced dementia, stroke, functional quadriplegia, trach collar, dysphagia with peg, chronic constipation sent from SNF with trach dislodged which was replaced by ED doc.  Found to have:  Fever and leukocytosis - probably due to  pneumonia which is either aspiration or pseudomonas (had in past)  elevated lactate - likely combination of septic shock and hypoxia      Problem/Plan - 1:  ·  Problem: Acute respiratory failure with hypoxia.  Plan: trach fixed, now tolerating trach collar  ENT eval: to be readmitted in the future for larger cuffless trach.  will attempt to schedule prior to discharge.      Problem/Plan - 2:  ·  Problem: Aspiration pneumonia of right lung, unspecified aspiration pneumonia type, unspecified part of lung.  Plan:  As per Id monitor off abx.   no other signs of infection found.      Problem/Plan - 3:  ·  Problem: Type 2 diabetes mellitus with other specified complication, with long-term current use of insulin.  Plan: continue basal insulin.   FS checks q6h with lispro coverage.      Problem/Plan - 4:  ·  Problem: Gastroesophageal reflux disease, esophagitis presence not specified.  Plan: conitnue pepcid.      Problem/Plan - 5:  ·  Problem: Constipation, unspecified constipation type.  Plan: continue bowel regemin  did have loose BM in ED.      Problem/Plan - 6:  DVT proph: lovenox.        Problem/Plan - 7:  ·  Problem: Advanced directives, counseling/discussion.   wishes for her to remain full code and wants all medical care possible.       Dc to SNF possibly tomorrow.

## 2019-01-14 NOTE — PROGRESS NOTE ADULT - PROBLEM SELECTOR PLAN 1
ENT follow up  oral hygiene  skin care  assist with ADL  monitor vs and HD and Sat  on emp ABX  bronchodilators, chest PT, suction PRN,   serial labs  serial PE  will follow and monitor  eventually planned for Vent unit [facility]  pt is full code, discussed with

## 2019-01-14 NOTE — PROGRESS NOTE ADULT - ASSESSMENT
Pt with chronic trach due to CVA and poor mental status, dislodged trach but now presents with Sepsis, possible due to UTI vs aspiration..  Probably dehydrated.  Improved today, tolerating trach collar with good sats.   Unclear source of sepsis. Lactate coming down.  Overall improved, somewhat congested.  Palliative note apprec--wants all treatments.  Off antibiotics  Should be able to return to [facility]

## 2019-01-14 NOTE — PROGRESS NOTE ADULT - SUBJECTIVE AND OBJECTIVE BOX
ID Progress note     Name: BREA BECKHAM  Age: 73y  Gender: Female  MRN: [id number]    Interval History-- Patient well known to my service , 73F long term resident of snf advanced dementia, stroke, functional quadriplegia, trach collar, dysphagia with peg, chronic constipation sent from SNF with trach dislodged.  As per family she was fine last night.  This morning trach came out and they were unable to put back in so they sent her to the ED.  Ed attending replaced trach.  However, was only able to place a size 4.   Some bleeding noted after trach placement that seems to have stopped. In the ED she was found to be febrile, leukocytosis, elevated lactate, dehydration. So far all sepsis work up is negative, she is afebrile and comfortable on trach collar   Notes reviewed    Past Medical History--  Respiratory failure  Constipation  GERD (gastroesophageal reflux disease)  Hypertension  Respiratory failure  Diabetes mellitus  Aphasic stroke  Dementia of frontal lobe type  Tracheostomy in place  Gastrostomy in place  Hx of appendectomy      For details regarding the patient's social history, family history, and other miscellaneous elements, please refer the initial infectious diseases consultation and/or the admitting history and physical examination for this admission.    Allergies--  Allergies    codeine (Hives)    Intolerances        Medications--  Antibiotics:  piperacillin/tazobactam IVPB. 3.375 Gram(s) IV Intermittent every 8 hours    Immunologic:    Other:  acetaminophen   Tablet .. PRN  ALBUTerol    0.083%  artificial  tears Solution  dextrose 40% Gel PRN  dextrose 5%.  dextrose 50% Injectable  dextrose 50% Injectable  dextrose 50% Injectable  enoxaparin Injectable  famotidine    Tablet  fentaNYL   Patch  12 MICROgram(s)/Hr  glucagon  Injectable PRN  guaiFENesin   Syrup  (Sugar-Free)  insulin glargine Injectable (LANTUS)  insulin lispro (HumaLOG) corrective regimen sliding scale  polyethylene glycol 3350  simethicone drops  sodium chloride 0.65% Nasal  sodium chloride 0.9%.      Review of Systems--  Review of systems unable to be obtained secondary to clinical condition.    Physical Examination--    Vital Signs: T(F): 98.2 (01-14-19 @ 04:05), Max: 98.6 (01-14-19 @ 00:10)  HR: 0 (01-14-19 @ 06:00)  BP: 138/77 (01-14-19 @ 06:00)  RR: 21 (01-14-19 @ 06:00)  SpO2: 96% (01-14-19 @ 06:00)  Wt(kg): --  GEN: NAD, Nonverbal chronically ill  HEENT: normocephalic and atraumatic. EOMI. MARKIE. Moist mucosa. Clear Posterior pharynx.  NECK: Supple. No carotid bruits.  Trach+   LUNGS: Decreased to auscultation.  HEART: Regular rate and rhythm without murmur.  ABDOMEN: Soft, nontender, and nondistended.  Positive bowel sounds.  No hepatosplenomegaly was noted.  EXTREMITIES: Without any cyanosis, clubbing, rash, lesions or edema.  NEUROLOGIC: nonverbal  No focal neurological deficits   SKIN: No ulceration or induration present.    Laboratory Studies--  CBC                        11.8   6.61  )-----------( 178      ( 14 Jan 2019 06:48 )             37.2       Chemistries  01-14    143  |  108  |  10  ----------------------------<  133<H>  3.6   |  27  |  0.83    Ca    8.4      14 Jan 2019 06:48    TPro  5.6<L>  /  Alb  2.5<L>  /  TBili  0.8  /  DBili  x   /  AST  39  /  ALT  36  /  AlkPhos  77  01-13    Lactate, Blood: 1.1 mmol/L (01-13-19 @ 07:13)      Culture Data    Culture - Blood (collected 12 Jan 2019 19:12)  Source: .Blood Blood-Peripheral  Preliminary Report (13 Jan 2019 20:01):    No growth to date.    Culture - Blood (collected 12 Jan 2019 19:12)  Source: .Blood Blood-Peripheral  Preliminary Report (13 Jan 2019 20:01):    No growth to date.    Culture - Sputum (collected 11 Jan 2019 17:17)  Source: .Sputum Sputum  Gram Stain (11 Jan 2019 22:40):    Few polymorphonuclear leukocytes per low power field    No Squamous epithelial cells per low power field    Rare Gram Positive Rods per oil power field  Final Report (13 Jan 2019 19:50):    Normal Respiratory Elvira present    Culture - Blood (collected 11 Jan 2019 12:12)  Source: .Blood Blood-Peripheral  Gram Stain (13 Jan 2019 01:45):    Growth in aerobic bottle: Gram Positive Cocci in Clusters    Growth in anaerobic bottle: Gram Positive Cocci in Clusters  Preliminary Report (13 Jan 2019 14:36):    Growth in aerobic bottle: Coag Negative Staphylococcus    Single set isolate, possible contaminant. Contact    Microbiology if susceptibility testing clinically    indicated.    Growth in anaerobic bottle: Gram Positive Cocci in Clusters    "Due to technicalproblems, Proteus sp. will Not be reported as part of    the BCID panel until further notice"    ***Blood Panel PCR results on this specimen are available    approximately 3 hours after the Gram stain result.***    Gram stain, PCR, and/or culture results maynot always    correspond due to difference in methodologies.    ************************************************************    This PCR assay was performed using [employer].    The following targets are tested for: Enterococcus,    vancomycin resistantenterococci, Listeria monocytogenes,    coagulase negative staphylococci, S. aureus,    methicillin resistant S. aureus, Streptococcus agalactiae    (Group B), S. pneumoniae, S. pyogenes (Group A),    Acinetobacter baumannii, Enterobacter cloacae, E. coli,    Klebsiella oxytoca, K. pneumoniae, Proteus sp.,    Serratia marcescens, Haemophilus influenzae,    Neisseria meningitidis, Pseudomonas aeruginosa, Candida    albicans, C. glabrata, C krusei, C parapsilosis,    C. tropicalis and the KPC resistance gene.  Organism: Blood Culture PCR (12 Jan 2019 11:44)  Organism: Blood Culture PCR (12 Jan 2019 11:44)    Culture - Blood (collected 11 Jan 2019 12:12)  Source: .Blood Blood-Peripheral  Gram Stain (13 Jan 2019 22:38):    Growth in aerobic bottle: Gram Positive Cocci in Clusters  Preliminary Report (13 Jan 2019 22:38):    Growth in aerobic bottle: Gram Positive Cocci in Clusters    Culture - Urine (collected 11 Jan 2019 11:03)  Source: .Urine Clean Catch (Midstream)  Final Report (12 Jan 2019 12:59):    Culture grew 3 or more types of organisms which indicate    collection contamination; consider recollection only if clinically    indicated.      Radiology:  Xray Chest 1 View- PORTABLE-Routine (01.12.19 @ 07:18) >    Findings: The patient is status post tracheostomy. The patient's right   hand and arm obscure the right lung base limiting evaluation.    The lungs are grossly clear. The heart size is at the upper limits of  normal. There are mild multilevel degenerative changes of the thoracic   spine.    IMPRESSION: As above, no interval change.      Assessment--    73F advanced dementia, CVA, functional quadriplegia, trach collar, dysphagia with peg, nonverbal sent from [facility] with trach dislodged, replaced trach in ED, found to be febrile to 101 and WBC was 21K. Noted to lactate of 12. CXR clear and UA +.   Fever leukocytosis may be reactive to trach manipulation as all cs are negative and CXR is negative . Initial blood cs with Coag. negative staph likely skin contaminant , repeat blood cs  ngtd    Suggestions--  - will dc antibiotics and observe for 24 hours   - if no fever , dc back to SNF in 24 hours   - trend cbc      Continue with present regiment.  Appropriate use of antibiotics and adverse effects reviewed.    I have discussed the above plan of care with patient' family in detail. They expressed understanding of the treatment plan . Risks, benefits and alternatives discussed in detail. I have asked if they have any questions or concerns and appropriately addressed them to the best of my ability .      > 35 minutes spent in direct patient care reviewing  the notes, lab data/ imaging , discussion with multidisciplinary team. All questions were addressed and answered to the best of my capacity .    Thank you for allowing me to participate in the care of your patient .        Alejandrina Brantley MD  [phone]

## 2019-01-14 NOTE — PROGRESS NOTE ADULT - SUBJECTIVE AND OBJECTIVE BOX
Patient is a 73y old  Female who presents with a chief complaint of dislodged trach (13 Jan 2019 04:01)    INTERVAL HPI/OVERNIGHT EVENTS: events noted.  no further fevers.  last blood cultures negative.  no signs of infection today.  tolerating tube feeds.     MEDICATIONS  (STANDING):  ALBUTerol    0.083% 2.5 milliGRAM(s) Nebulizer every 6 hours  artificial  tears Solution 1 Drop(s) Both EYES three times a day  dextrose 5%. 1000 milliLiter(s) (50 mL/Hr) IV Continuous <Continuous>  dextrose 50% Injectable 12.5 Gram(s) IV Push once  dextrose 50% Injectable 25 Gram(s) IV Push once  dextrose 50% Injectable 25 Gram(s) IV Push once  enoxaparin Injectable 30 milliGRAM(s) SubCutaneous daily  famotidine    Tablet 20 milliGRAM(s) Oral daily  fentaNYL   Patch  12 MICROgram(s)/Hr 1 Patch Transdermal every 72 hours  insulin glargine Injectable (LANTUS) 20 Unit(s) SubCutaneous at bedtime  insulin lispro (HumaLOG) corrective regimen sliding scale   SubCutaneous every 6 hours  polyethylene glycol 3350 17 Gram(s) Oral daily  simethicone drops 60 milliGRAM(s) Oral two times a day  sodium chloride 0.65% Nasal 1 Spray(s) Both Nostrils four times a day    MEDICATIONS  (PRN):  acetaminophen   Tablet .. 650 milliGRAM(s) Oral every 6 hours PRN Temp greater or equal to 38C (100.4F), Mild Pain (1 - 3)  dextrose 40% Gel 15 Gram(s) Oral once PRN Blood Glucose LESS THAN 70 milliGRAM(s)/deciliter  glucagon  Injectable 1 milliGRAM(s) IntraMuscular once PRN Glucose LESS THAN 70 milligrams/deciliter      Allergies  codeine (Hives)    REVIEW OF SYSTEMS:  unable pt non-verbal.     Vital Signs Last 24 Hrs  T(C): 37 (14 Jan 2019 08:15), Max: 37 (14 Jan 2019 00:10)  T(F): 98.6 (14 Jan 2019 08:15), Max: 98.6 (14 Jan 2019 00:10)  HR: 78 (14 Jan 2019 12:00) (0 - 79)  BP: 158/60 (14 Jan 2019 12:00) (114/58 - 158/60)  BP(mean): 86 (14 Jan 2019 12:00) (76 - 95)  RR: 26 (14 Jan 2019 12:00) (11 - 29)  SpO2: 98% (14 Jan 2019 12:00) (91% - 100%)    PHYSICAL EXAM:  GENERAL: NAD  EYES: conjunctiva and sclera clear  ENMT: Moist mucous membranes, +trach  NECK: Supple, No JVD  NERVOUS SYSTEM:  withdraws to pain, groans. contracted.   CHEST/LUNG: Clear to auscultation bilaterally; ceasar rhonchi.   HEART: Regular rate and rhythm;   ABDOMEN: Soft, Nontender, Nondistended; Bowel sounds present  EXTREMITIES:  2+ Peripheral Pulses, No clubbing, cyanosis, or edema      LABS:                        11.8   6.61  )-----------( 178      ( 14 Jan 2019 06:48 )             37.2     14 Jan 2019 06:48    143    |  108    |  10     ----------------------------<  133    3.6     |  27     |  0.83     Ca    8.4        14 Jan 2019 06:48          CAPILLARY BLOOD GLUCOSE  POCT Blood Glucose.: 140 mg/dL (14 Jan 2019 11:31)  POCT Blood Glucose.: 144 mg/dL (14 Jan 2019 05:59)  POCT Blood Glucose.: 137 mg/dL (14 Jan 2019 00:36)  POCT Blood Glucose.: 131 mg/dL (13 Jan 2019 17:01)      RADIOLOGY & ADDITIONAL TESTS:    Imaging Personally Reviewed:  [ ] YES     Consultant(s) Notes Reviewed:  pulm, Id, ENT    Care Discussed with Consultants/Other Providers:    Advanced Directives: [ ] DNR  [ ] No feeding tube  [ ] MOLST in chart  [ ] MOLST completed today  [ ] Unknown

## 2019-01-15 DIAGNOSIS — K92.0 HEMATEMESIS: ICD-10-CM

## 2019-01-15 LAB
-  AMPICILLIN/SULBACTAM: SIGNIFICANT CHANGE UP
-  CEFAZOLIN: SIGNIFICANT CHANGE UP
-  CLINDAMYCIN: SIGNIFICANT CHANGE UP
-  ERYTHROMYCIN: SIGNIFICANT CHANGE UP
-  GENTAMICIN: SIGNIFICANT CHANGE UP
-  OXACILLIN: SIGNIFICANT CHANGE UP
-  RIFAMPIN: SIGNIFICANT CHANGE UP
-  TETRACYCLINE: SIGNIFICANT CHANGE UP
-  TRIMETHOPRIM/SULFAMETHOXAZOLE: SIGNIFICANT CHANGE UP
-  VANCOMYCIN: SIGNIFICANT CHANGE UP
CULTURE RESULTS: SIGNIFICANT CHANGE UP
CULTURE RESULTS: SIGNIFICANT CHANGE UP
GLUCOSE BLDC GLUCOMTR-MCNC: 116 MG/DL — HIGH (ref 70–99)
GLUCOSE BLDC GLUCOMTR-MCNC: 127 MG/DL — HIGH (ref 70–99)
GLUCOSE BLDC GLUCOMTR-MCNC: 95 MG/DL — SIGNIFICANT CHANGE UP (ref 70–99)
GLUCOSE BLDC GLUCOMTR-MCNC: 97 MG/DL — SIGNIFICANT CHANGE UP (ref 70–99)
GRAM STN FLD: SIGNIFICANT CHANGE UP
METHOD TYPE: SIGNIFICANT CHANGE UP
ORGANISM # SPEC MICROSCOPIC CNT: SIGNIFICANT CHANGE UP
ORGANISM # SPEC MICROSCOPIC CNT: SIGNIFICANT CHANGE UP
SPECIMEN SOURCE: SIGNIFICANT CHANGE UP
SPECIMEN SOURCE: SIGNIFICANT CHANGE UP

## 2019-01-15 PROCEDURE — 74018 RADEX ABDOMEN 1 VIEW: CPT | Mod: 26

## 2019-01-15 PROCEDURE — 99233 SBSQ HOSP IP/OBS HIGH 50: CPT

## 2019-01-15 RX ORDER — MINERAL OIL
133 OIL (ML) MISCELLANEOUS ONCE
Qty: 0 | Refills: 0 | Status: COMPLETED | OUTPATIENT
Start: 2019-01-15 | End: 2019-01-15

## 2019-01-15 RX ORDER — INSULIN GLARGINE 100 [IU]/ML
10 INJECTION, SOLUTION SUBCUTANEOUS ONCE
Qty: 0 | Refills: 0 | Status: COMPLETED | OUTPATIENT
Start: 2019-01-15 | End: 2019-01-15

## 2019-01-15 RX ORDER — VANCOMYCIN HCL 1 G
1000 VIAL (EA) INTRAVENOUS EVERY 12 HOURS
Qty: 0 | Refills: 0 | Status: DISCONTINUED | OUTPATIENT
Start: 2019-01-15 | End: 2019-01-15

## 2019-01-15 RX ORDER — PANTOPRAZOLE SODIUM 20 MG/1
40 TABLET, DELAYED RELEASE ORAL
Qty: 0 | Refills: 0 | Status: DISCONTINUED | OUTPATIENT
Start: 2019-01-15 | End: 2019-01-16

## 2019-01-15 RX ORDER — KETOROLAC TROMETHAMINE 30 MG/ML
15 SYRINGE (ML) INJECTION ONCE
Qty: 0 | Refills: 0 | Status: DISCONTINUED | OUTPATIENT
Start: 2019-01-15 | End: 2019-01-15

## 2019-01-15 RX ORDER — DEXTROSE MONOHYDRATE, SODIUM CHLORIDE, AND POTASSIUM CHLORIDE 50; .745; 4.5 G/1000ML; G/1000ML; G/1000ML
1000 INJECTION, SOLUTION INTRAVENOUS
Qty: 0 | Refills: 0 | Status: DISCONTINUED | OUTPATIENT
Start: 2019-01-15 | End: 2019-01-16

## 2019-01-15 RX ORDER — SIMETHICONE 80 MG/1
60 TABLET, CHEWABLE ORAL
Qty: 0 | Refills: 0 | Status: DISCONTINUED | OUTPATIENT
Start: 2019-01-15 | End: 2019-01-16

## 2019-01-15 RX ADMIN — SIMETHICONE 60 MILLIGRAM(S): 80 TABLET, CHEWABLE ORAL at 12:40

## 2019-01-15 RX ADMIN — Medication 1 DROP(S): at 22:52

## 2019-01-15 RX ADMIN — Medication 133 MILLILITER(S): at 08:23

## 2019-01-15 RX ADMIN — Medication 1 SPRAY(S): at 18:50

## 2019-01-15 RX ADMIN — Medication 1 SPRAY(S): at 12:39

## 2019-01-15 RX ADMIN — Medication 650 MILLIGRAM(S): at 07:50

## 2019-01-15 RX ADMIN — Medication 1 DROP(S): at 05:33

## 2019-01-15 RX ADMIN — ALBUTEROL 2.5 MILLIGRAM(S): 90 AEROSOL, METERED ORAL at 13:26

## 2019-01-15 RX ADMIN — PANTOPRAZOLE SODIUM 40 MILLIGRAM(S): 20 TABLET, DELAYED RELEASE ORAL at 12:38

## 2019-01-15 RX ADMIN — Medication 1 SPRAY(S): at 05:33

## 2019-01-15 RX ADMIN — SIMETHICONE 60 MILLIGRAM(S): 80 TABLET, CHEWABLE ORAL at 18:50

## 2019-01-15 RX ADMIN — ALBUTEROL 2.5 MILLIGRAM(S): 90 AEROSOL, METERED ORAL at 01:16

## 2019-01-15 RX ADMIN — FENTANYL CITRATE 1 PATCH: 50 INJECTION INTRAVENOUS at 07:36

## 2019-01-15 RX ADMIN — PANTOPRAZOLE SODIUM 40 MILLIGRAM(S): 20 TABLET, DELAYED RELEASE ORAL at 23:32

## 2019-01-15 RX ADMIN — Medication 650 MILLIGRAM(S): at 07:20

## 2019-01-15 RX ADMIN — Medication 250 MILLIGRAM(S): at 02:30

## 2019-01-15 RX ADMIN — POLYETHYLENE GLYCOL 3350 17 GRAM(S): 17 POWDER, FOR SOLUTION ORAL at 12:39

## 2019-01-15 RX ADMIN — INSULIN GLARGINE 10 UNIT(S): 100 INJECTION, SOLUTION SUBCUTANEOUS at 23:30

## 2019-01-15 RX ADMIN — Medication 1 SPRAY(S): at 23:35

## 2019-01-15 RX ADMIN — SIMETHICONE 60 MILLIGRAM(S): 80 TABLET, CHEWABLE ORAL at 05:33

## 2019-01-15 RX ADMIN — FENTANYL CITRATE 1 PATCH: 50 INJECTION INTRAVENOUS at 17:25

## 2019-01-15 RX ADMIN — ALBUTEROL 2.5 MILLIGRAM(S): 90 AEROSOL, METERED ORAL at 19:59

## 2019-01-15 RX ADMIN — SIMETHICONE 60 MILLIGRAM(S): 80 TABLET, CHEWABLE ORAL at 23:37

## 2019-01-15 RX ADMIN — ALBUTEROL 2.5 MILLIGRAM(S): 90 AEROSOL, METERED ORAL at 07:58

## 2019-01-15 RX ADMIN — Medication 1 DROP(S): at 14:54

## 2019-01-15 NOTE — PROVIDER CONTACT NOTE (CRITICAL VALUE NOTIFICATION) - ACTION/TREATMENT ORDERED:
continue present treatment pending spcu bed
Primary RN aware, JAROD Mukherjee notified, continue zosyn IV abx
Primary RN aware, JAROD Mukherjee notified, marcelo esteves
Dr Zurita made aware, continues on IV Zosyn, 1 dose Vancomycin ordered, and repeat blood cultures.
repeat blood culture x2  started vancomycin iv 1 gram q 12hrs
continue to current TX

## 2019-01-15 NOTE — PROGRESS NOTE ADULT - ASSESSMENT
73y Female Advanced Dementia, CVA , Functional Quadriplegia, Chronic Vent Dependance s/p trach collar, dysphagia with peg, chronic constipation sent from SNF with trach dislodged and was found to have fever and leukocytosis RX with  pneumonia.      Clinically doing well.  Case D/W PMD who advises patient likely to be sent back to SNF tomorrow    Labs reviewed, VSS

## 2019-01-15 NOTE — PROGRESS NOTE ADULT - SUBJECTIVE AND OBJECTIVE BOX
Critical Care PA - LOVE     73y Female Advanced Dementia, CVA , Functional Quadriplegia, Chronic Vent Dependance s/p trach collar, dysphagia with peg, chronic constipation sent from SNF with trach dislodged and was found to have fever and leukocytosis RX with  pneumonia.    --- PMHx.---    Respiratory failure  Constipation  GERD (gastroesophageal reflux disease)  Hypertension  Respiratory failure  Diabetes mellitus  Aphasic stroke  Dementia of frontal lobe type         Review Of Systems: All reviewed systems were negative.    Previous Medical Record reviewed and case has been discussed with EICU.    --- Medications ---    acetaminophen   Tablet .. 650 milliGRAM(s) PRN  ALBUTerol    0.083% 2.5 milliGRAM(s)  artificial  tears Solution 1 Drop(s)  dextrose 40% Gel 15 Gram(s) PRN  dextrose 5%. 1000 milliLiter(s)  dextrose 50% Injectable 12.5 Gram(s)  dextrose 50% Injectable 25 Gram(s)  dextrose 50% Injectable 25 Gram(s)  enoxaparin Injectable 30 milliGRAM(s)  famotidine    Tablet 20 milliGRAM(s)  fentaNYL   Patch  12 MICROgram(s)/Hr 1 Patch  glucagon  Injectable 1 milliGRAM(s) PRN  insulin glargine Injectable (LANTUS) 20 Unit(s)  insulin lispro (HumaLOG) corrective regimen sliding scale    polyethylene glycol 3350 17 Gram(s)  simethicone drops 60 milliGRAM(s)  sodium chloride 0.65% Nasal 1 Spray(s)      DVT Prophylaxis : Lovenox     Advanced Directives : Full Code     T(C): 36.9 (01-15-19 @ 00:04), Max: 37.2 (01-14-19 @ 16:01)  HR: 66 (01-15-19 @ 00:00)  BP: 118/56 (01-15-19 @ 00:00)  RR: 21 (01-15-19 @ 00:00)  SpO2: 100% (01-15-19 @ 00:00)          --- Labratories ---                           11.8   6.61  )-----------( 178      ( 14 Jan 2019 06:48 )             37.2    01-14    143  |  108  |  10  ----------------------------<  133<H>  3.6   |  27  |  0.83    Ca    8.4      14 Jan 2019 06:48    TPro  5.6<L>  /  Alb  2.5<L>  /  TBili  0.8  /  DBili  x   /  AST  39  /  ALT  36  /  AlkPhos  77  01-13        (Reviewing data, imaging, discussing with multidisciplinary team, non inclusive of procedures, discussing goals of care with patient/family)

## 2019-01-15 NOTE — CONSULT NOTE ADULT - PROBLEM SELECTOR RECOMMENDATION 9
Check abg  Chronic trach
GOC reviewed - pt is full code  discussed with   known to me from [facility]   cont medical regimen - emp ABX - serial labs, serial PE,   ICU admit  bronchodilators  suction PRN  chest PT  oral hygiene  will follow and monitor  monitor for distress, suffering, pain, dyspnea,
coffee ground from PEG  no melena  monitor cbc   ppi bid  monitor closely
would admit at future date soon and revise trach site and insert larger cuffless tube.

## 2019-01-15 NOTE — PROGRESS NOTE ADULT - ASSESSMENT
73F advanced dementia, stroke, functional quadriplegia, trach collar, dysphagia with peg, chronic constipation sent from SNF with trach dislodged which was replaced by ED doc.  Found to have:  Fever and leukocytosis - probably due to  pneumonia which is either aspiration or pseudomonas (had in past)  elevated lactate - likely combination of septic shock and hypoxia      Problem/Plan - 1:  ·  Problem: Acute respiratory failure with hypoxia.  Plan: trach fixed, now tolerating trach collar  ENT eval: to be readmitted in the future for larger cuffless trach.  will attempt to schedule prior to discharge.      Problem/Plan - 2:  ·  Problem: Aspiration pneumonia of right lung, unspecified aspiration pneumonia type, unspecified part of lung.  Plan:  As per Id monitor off abx.   no other signs of infection found.      Problem/Plan - 3:  ·  Problem: Type 2 diabetes mellitus with other specified complication, with long-term current use of insulin.  Plan: continue basal insulin.   FS checks q6h with lispro coverage.      Problem/Plan - 4:  ·  Problem: Gastroesophageal reflux disease, esophagitis presence not specified.  Plan: conitnue pepcid.      Problem/Plan - 5:  ·  Problem: Constipation, unspecified constipation type.  Plan: continue bowel regemin  did have loose BM in ED.      Problem/Plan - 6:  DVT proph: lovenox.        Problem/Plan - 7:  ·  Problem: Advanced directives, counseling/discussion.   wishes for her to remain full code and wants all medical care possible.       Dc to SNF possibly tomorrow. 73F advanced dementia, stroke, functional quadriplegia, trach collar, dysphagia with peg, chronic constipation sent from SNF with trach dislodged which was replaced by ED doc.  Found to have:  Fever and leukocytosis - probably due to  pneumonia which is either aspiration or pseudomonas (had in past)  elevated lactate - likely combination of septic shock and hypoxia      Problem/Plan - 1:  ·  Problem: Acute respiratory failure with hypoxia.  Plan: trach fixed, now tolerating trach collar   ENT eval appreciated trach site revision scheduled for 1/23/19 at [place]; if otherwise medically clear will dc to [facility] and patient can have surgery as outpatient.     Problem/Plan - 2:  ·  Problem: Aspiration pneumonia of right lung, unspecified aspiration pneumonia type, unspecified part of lung.  Plan:  As per Id monitor off abx.   await repeat cultures.  CNS probably contaminant.      Problem/Plan - 3:  ·  Problem: Type 2 diabetes mellitus with other specified complication, with long-term current use of insulin.  Plan: continue basal insulin.   FS checks q6h with lispro coverage.      Problem/Plan - 4:  ·  Problem: Gastroesophageal reflux disease, esophagitis presence not specified.  Plan: conitnue pepcid.   Now with black gastric fluid - rule out GI bleed.  so far no drop in h/h will monitor.  GI consult appreciated. PPI BID.      Problem/Plan - 5:  ·  Problem: Constipation, unspecified constipation type.  Plan: continue bowel regimen  did have loose BM in ED.  will increase simethicone to help with bowel gas.      Problem/Plan - 6:  DVT proph: lovenox.        Problem/Plan - 7:  ?if episodes of rigidity seizure or myoclonic jerks?  Dr Shin to eval patient.       Problem/Plan - 8:  ·  Problem: Advanced directives, counseling/discussion.   wishes for her to remain full code and wants all medical care possible.

## 2019-01-15 NOTE — PROGRESS NOTE ADULT - PROBLEM SELECTOR PLAN 1
trach and peg in place  blood cx noted  on VANCO  overnight ICU PA note reviewed  ID to follow up  trach care and suction  oral hygiene  asp prec  HOB elev  assist with all ADL  prognosis POOR  pt is full code  spoke with  last night  eventual DC to [facility] unit

## 2019-01-15 NOTE — PROGRESS NOTE ADULT - SUBJECTIVE AND OBJECTIVE BOX
Date/Time Patient Seen:  		  Referring MD:   Data Reviewed	       Patient is a 73y old  Female who presents with a chief complaint of PNA (15 Lei 2019 01:04)      Subjective/HPI     PAST MEDICAL & SURGICAL HISTORY:  Respiratory failure  Constipation  GERD (gastroesophageal reflux disease)  Hypertension  Respiratory failure  Diabetes mellitus  Aphasic stroke  Dementia of frontal lobe type  Tracheostomy in place  Gastrostomy in place  Hx of appendectomy        Medication list         MEDICATIONS  (STANDING):  ALBUTerol    0.083% 2.5 milliGRAM(s) Nebulizer every 6 hours  artificial  tears Solution 1 Drop(s) Both EYES three times a day  dextrose 5%. 1000 milliLiter(s) (50 mL/Hr) IV Continuous <Continuous>  dextrose 50% Injectable 12.5 Gram(s) IV Push once  dextrose 50% Injectable 25 Gram(s) IV Push once  dextrose 50% Injectable 25 Gram(s) IV Push once  enoxaparin Injectable 30 milliGRAM(s) SubCutaneous daily  famotidine    Tablet 20 milliGRAM(s) Oral daily  fentaNYL   Patch  12 MICROgram(s)/Hr 1 Patch Transdermal every 72 hours  insulin glargine Injectable (LANTUS) 20 Unit(s) SubCutaneous at bedtime  insulin lispro (HumaLOG) corrective regimen sliding scale   SubCutaneous every 6 hours  polyethylene glycol 3350 17 Gram(s) Oral daily  simethicone drops 60 milliGRAM(s) Oral two times a day  sodium chloride 0.65% Nasal 1 Spray(s) Both Nostrils four times a day  vancomycin  IVPB 1000 milliGRAM(s) IV Intermittent every 12 hours    MEDICATIONS  (PRN):  acetaminophen   Tablet .. 650 milliGRAM(s) Oral every 6 hours PRN Temp greater or equal to 38C (100.4F), Mild Pain (1 - 3)  dextrose 40% Gel 15 Gram(s) Oral once PRN Blood Glucose LESS THAN 70 milliGRAM(s)/deciliter  glucagon  Injectable 1 milliGRAM(s) IntraMuscular once PRN Glucose LESS THAN 70 milligrams/deciliter         Vitals log        ICU Vital Signs Last 24 Hrs  T(C): 37.1 (15 Lei 2019 04:04), Max: 37.2 (14 Jan 2019 16:01)  T(F): 98.7 (15 Lei 2019 04:04), Max: 98.9 (14 Jan 2019 16:01)  HR: 74 (15 Lei 2019 06:10) (61 - 79)  BP: 127/46 (15 Lei 2019 06:10) (105/47 - 158/60)  BP(mean): 69 (15 Lei 2019 06:10) (64 - 86)  ABP: --  ABP(mean): --  RR: 19 (15 Lei 2019 06:10) (8 - 26)  SpO2: 100% (15 Lei 2019 06:10) (92% - 100%)           Input and Output:  I&O's Detail    14 Jan 2019 07:01  -  15 Lei 2019 07:00  --------------------------------------------------------  IN:    Enteral Tube Flush: 600 mL    IV PiggyBack: 250 mL    ns in tub fed  [id number]: 1200 mL  Total IN: 2050 mL    OUT:  Total OUT: 0 mL    Total NET: 2050 mL          Lab Data                        11.8   6.61  )-----------( 178      ( 14 Jan 2019 06:48 )             37.2     01-14    143  |  108  |  10  ----------------------------<  133<H>  3.6   |  27  |  0.83    Ca    8.4      14 Jan 2019 06:48    TPro  5.6<L>  /  Alb  2.5<L>  /  TBili  0.8  /  DBili  x   /  AST  39  /  ALT  36  /  AlkPhos  77  01-13            Review of Systems	      Objective     Physical Examination    heart s1s2  lung dec BS  abd soft      Pertinent Lab findings & Imaging      Annalise:  NO   Adequate UO     I&O's Detail    14 Jan 2019 07:01  -  15 Lei 2019 07:00  --------------------------------------------------------  IN:    Enteral Tube Flush: 600 mL    IV PiggyBack: 250 mL    ns in tub fed  [id number]: 1200 mL  Total IN: 2050 mL    OUT:  Total OUT: 0 mL    Total NET: 2050 mL               Discussed with:     Cultures:	        Radiology

## 2019-01-15 NOTE — PROGRESS NOTE ADULT - SUBJECTIVE AND OBJECTIVE BOX
PULMONARY/CRITICAL CARE      INTERVAL HPI/OVERNIGHT EVENTS:  Pt afebrile. No sob. Opens eyes, otherwise unresponsive    73y FemaleHPI:  73F advanced dementia, stroke, functional quadriplegia, trach collar, dysphagia with peg, chronic constipation sent from SNF with trach dislodged.  As per family she was fine last night.  This morning trach came out and they were unable to put back in so they sent her to the ED.    Ed attending replaced trach.  However, was only able to place a size 4.   Some bleeding noted after trach placement that seems to have stopped.  In the ED she was found to be febrile, leukocytosis, elevated lactate, dehydration.     Patient is non-verbal.  Grunts in response to pain.     at bedside.  Does not have further family history.  Reports their children are alive and healthy. (11 Jan 2019 12:49)        PAST MEDICAL & SURGICAL HISTORY:  Respiratory failure  Constipation  GERD (gastroesophageal reflux disease)  Hypertension  Respiratory failure  Diabetes mellitus  Aphasic stroke  Dementia of frontal lobe type  Tracheostomy in place  Gastrostomy in place  Hx of appendectomy        ICU Vital Signs Last 24 Hrs  T(C): 37.1 (15 Lei 2019 04:04), Max: 37.2 (14 Jan 2019 16:01)  T(F): 98.7 (15 Lei 2019 04:04), Max: 98.9 (14 Jan 2019 16:01)  HR: 74 (15 Lei 2019 06:10) (61 - 79)  BP: 127/46 (15 Lei 2019 06:10) (105/47 - 158/60)  BP(mean): 69 (15 Lei 2019 06:10) (64 - 86)  ABP: --  ABP(mean): --  RR: 19 (15 Lei 2019 06:10) (8 - 26)  SpO2: 100% (15 Lei 2019 06:10) (92% - 100%)    Qtts:     I&O's Summary    14 Jan 2019 07:01  -  15 Lei 2019 07:00  --------------------------------------------------------  I  REVIEW OF SYSTEMS:    CONSTITUTIONAL: No fever, weight loss, or fatigue  RESPIRATORY: No cough, wheezing, chills or hemoptysis; No shortness of breath  Has trach  CARDIOVASCULAR: No chest pain, palpitations, dizziness, or leg swelling  GASTROINTESTINAL: No abdominal or epigastric pain. No nausea, vomiting, or hematemesis; No diarrhea or constipation. No melena or hematochezia. Peg  NEURO: baseline very poor mental status      PHYSICAL EXAM:    GENERAL:thin female, opening eyes but unresponsive. No sob.  HEAD:  Atraumatic, Normocephalic  EYES: EOMI, PERRLA, conjunctiva and sclera clear eyes deviated to right  ENMT: No tonsillar erythema, exudates, or enlargement; Moist mucous membranes, Good dentition, No lesions  NECK: Supple, No JVD, Normal thyroid  NERVOUS SYSTEM: opens eyes, otherwise contracted and unresponsive  CHEST/LUNG: few bilat rhonchi, no wheezing, or rubs Trach intact on trach collar.  HEART: Regular rate and rhythm; No murmurs, rubs, or gallops  ABDOMEN: Soft, Nontender, Nondistended; Bowel sounds present  EXTREMITIES:  2+ Peripheral Pulses, No clubbing, cyanosis, or edema Contracted  LYMPH: No lymphadenopathy noted  SKIN: No rashes or lesions        LABS:                        11.8   6.61  )-----------( 178      ( 14 Jan 2019 06:48 )             37.2     01-14    143  |  108  |  10  ----------------------------<  133<H>  3.6   |  27  |  0.83    Ca    8.4      14 Jan 2019 06:48            vanco through     RADIOLOGY & ADDITIONAL STUDIES:      CRITICAL CARE TIME SPENT:

## 2019-01-15 NOTE — CONSULT NOTE ADULT - CONSULT REASON
.
Fever
GOC  chr resp failure  atelectasis  trach dislodged
coffee ground from PEG
trach compications
Acute respiratory failure  Sepsis  UTI

## 2019-01-15 NOTE — CHART NOTE - NSCHARTNOTEFT_GEN_A_CORE
Critical Care PA - Love     Blood Cx results repeated coming back again with [facility]PC.    As this is a repeat and  initially thought to be skin contaminate will restart ABX.    Will Repeat Blcx again tonight prior to Vanco Dose.    ID to be made awake by RN this AM.  WIll Discuss with covering Hospitalist.

## 2019-01-15 NOTE — CONSULT NOTE ADULT - SUBJECTIVE AND OBJECTIVE BOX
Chief Complaint:  Patient is a 73y old  Female who presents with a chief complaint of PNA (15 Lei 2019 01:04)    Respiratory failure  Constipation  GERD (gastroesophageal reflux disease)  Hypertension  Respiratory failure  Diabetes mellitus  Aphasic stroke  Dementia of frontal lobe type  Tracheostomy in place  Gastrostomy in place  Hx of appendectomy     HPI:  73F advanced dementia, stroke, functional quadriplegia, trach collar, dysphagia with peg, chronic constipation sent from SNF with trach dislodged.  As per family she was fine last night.  This morning trach came out and they were unable to put back in so they sent her to the ED.    Ed attending replaced trach.  However, was only able to place a size 4.   Some bleeding noted after trach placement that seems to have stopped.  In the ED she was found to be febrile, leukocytosis, elevated lactate, dehydration.     Patient is non-verbal.  Grunts in response to pain.     at bedside.  Does not have further family history.  Reports their children are alive and healthy. (2019 12:49)      codeine (Hives)      acetaminophen   Tablet .. 650 milliGRAM(s) Oral every 6 hours PRN  ALBUTerol    0.083% 2.5 milliGRAM(s) Nebulizer every 6 hours  artificial  tears Solution 1 Drop(s) Both EYES three times a day  dextrose 40% Gel 15 Gram(s) Oral once PRN  dextrose 5%. 1000 milliLiter(s) IV Continuous <Continuous>  dextrose 50% Injectable 12.5 Gram(s) IV Push once  dextrose 50% Injectable 25 Gram(s) IV Push once  dextrose 50% Injectable 25 Gram(s) IV Push once  fentaNYL   Patch  12 MICROgram(s)/Hr 1 Patch Transdermal every 72 hours  glucagon  Injectable 1 milliGRAM(s) IntraMuscular once PRN  insulin glargine Injectable (LANTUS) 20 Unit(s) SubCutaneous at bedtime  insulin lispro (HumaLOG) corrective regimen sliding scale   SubCutaneous every 6 hours  pantoprazole  Injectable 40 milliGRAM(s) IV Push two times a day  polyethylene glycol 3350 17 Gram(s) Oral daily  simethicone drops 60 milliGRAM(s) Oral four times a day  sodium chloride 0.65% Nasal 1 Spray(s) Both Nostrils four times a day        FAMILY HISTORY:  No pertinent family history in first degree relatives            Relevant Family History:       Relevant Social History:       Physical Exam:    Vital Signs:  Vital Signs Last 24 Hrs  T(C): 37.1 (15 Lei 2019 04:04), Max: 37.2 (2019 16:01)  T(F): 98.7 (15 Lei 2019 04:04), Max: 98.9 (2019 16:01)  HR: 102 (15 Lei 2019 08:00) (65 - 102)  BP: 146/58 (15 Lei 2019 08:00) (105/47 - 158/60)  BP(mean): 83 (15 Lei 2019 08:00) (64 - 86)  RR: 25 (15 Lei 2019 08:00) (8 - 26)  SpO2: 100% (15 Lei 2019 08:00) (92% - 100%)  Daily     Daily Weight in k (15 Lei 2019 04:04)      HEENT:  NC/AT,  conjunctivae clear and pink, no thyromegaly, nodules, adenopathy, no JVD trach  Chest:  Full & symmetric excursion, no increased effort, breath sounds clear  Cardiovascular:  Regular rhythm, S1, S2, no murmur/rub/S3/S4, no abdominal bruit, no edema  Abdomen:  Soft, non-tender, non-distended, normoactive bowel sounds,  no masses ,no hepatosplenomeagaly, no signs of chronic liver disease peg  Extremities:  no cyanosis,clubbing or edema  Skin:  No rash/erythema/ecchymoses/petechiae/wounds/abscess/warm/dry  Neuro/Psych:  Alert, oriented, no asterixis, no tremor, no encephalopathy    Laboratory:                            11.8   6.61  )-----------( 178      ( 2019 06:48 )             37.2     -14    143  |  108  |  10  ----------------------------<  133<H>  3.6   |  27  |  0.83    Ca    8.4      2019 06:48              Imaging:

## 2019-01-15 NOTE — CHART NOTE - NSCHARTNOTEFT_GEN_A_CORE
Assessment:     Pt c hx dementia, stroke, functional quadriplegia, trach collar, dysphagia with peg, chronic constipation sent from SNF with  dislodged trach. This was replaced in the ED but pt also found to have fever, leukocytosis, elevated lactate and dehydration. Pt found to have aspiration pna and UTI. Per rounds, today pt developed Coffee ground emesis  from PEG but  no melena. GI MD recommends to continue feeding. Recommend adding NC porsource BID for pressure ulcers        Factors impacting intake: [ ] none [ ] nausea  [ ] vomiting [ ] diarrhea [ ] constipation  [ ]chewing problems [x ] swallowing issues  [ ] other:     Diet Presciption:   Intake: Glucerna 1.2 50ml/hr x 24hrs    Current Weight: Weight (kg): 59 (01-11 @ 07:40)  % Weight Change  121# (-4.0#)    Pertinent Medications: MEDICATIONS  (STANDING):  ALBUTerol    0.083% 2.5 milliGRAM(s) Nebulizer every 6 hours  artificial  tears Solution 1 Drop(s) Both EYES three times a day  dextrose 5%. 1000 milliLiter(s) (50 mL/Hr) IV Continuous <Continuous>  dextrose 50% Injectable 12.5 Gram(s) IV Push once  dextrose 50% Injectable 25 Gram(s) IV Push once  dextrose 50% Injectable 25 Gram(s) IV Push once  fentaNYL   Patch  12 MICROgram(s)/Hr 1 Patch Transdermal every 72 hours  insulin glargine Injectable (LANTUS) 20 Unit(s) SubCutaneous at bedtime  insulin lispro (HumaLOG) corrective regimen sliding scale   SubCutaneous every 6 hours  pantoprazole  Injectable 40 milliGRAM(s) IV Push two times a day  polyethylene glycol 3350 17 Gram(s) Oral daily  simethicone drops 60 milliGRAM(s) Oral four times a day  sodium chloride 0.65% Nasal 1 Spray(s) Both Nostrils four times a day    MEDICATIONS  (PRN):  acetaminophen   Tablet .. 650 milliGRAM(s) Oral every 6 hours PRN Temp greater or equal to 38C (100.4F), Mild Pain (1 - 3)  dextrose 40% Gel 15 Gram(s) Oral once PRN Blood Glucose LESS THAN 70 milliGRAM(s)/deciliter  glucagon  Injectable 1 milliGRAM(s) IntraMuscular once PRN Glucose LESS THAN 70 milligrams/deciliter    Pertinent Labs: 01-14 Na143 mmol/L Glu 133 mg/dL<H> K+ 3.6 mmol/L Cr  0.83 mg/dL BUN 10 mg/dL 01-13 Alb 2.5 g/dL<L>     CAPILLARY BLOOD GLUCOSE      POCT Blood Glucose.: 127 mg/dL (15 Lei 2019 06:43)  POCT Blood Glucose.: 136 mg/dL (14 Jan 2019 23:10)  POCT Blood Glucose.: 129 mg/dL (14 Jan 2019 18:22)    Skin: pressure ulcer L posterior neck stage II    Estimated Needs:   [ x] no change since previous assessment  [ ] recalculated:     Previous Nutrition Diagnosis:   [ ] Inadequate Energy Intake [ ]Inadequate Oral Intake [ ] Excessive Energy Intake   [ ] Underweight [ x] Increased Nutrient Needs [ ] Overweight/Obesity   [ ] Altered GI Function [ ] Unintended Weight Loss [ ] Food & Nutrition Related Knowledge Deficit [ ] Malnutrition     Nutrition Diagnosis is [ ] ongoing  [ ] resolved [ ] not applicable     New Nutrition Diagnosis: [ ] not applicable       Interventions:   Recommend  [ ] Change Diet To:  x] Nutrition Supplement   NC Prosource  [ ] Nutrition Support  [ ] Other:     Monitoring and Evaluation:   [ ] PO intake [ x ] Tolerance to diet prescription [ x ] weights [ x ] labs[ x ] follow up per protocol  [ ] other:

## 2019-01-15 NOTE — CONSULT NOTE ADULT - ASSESSMENT
Pt with chronic trach due to CVA and poor mental status, dislodged trach but now presents with Sepsis, possible due to UTI vs aspiration..  Probably dehydrated.
abnormal movement as per spouse not new -- no aed needed work up done in past for this
pt with # 4 tube tolerating wel,. should be switched to larger tube, would have to be done in operating room, not sure why cuffed tube being used.

## 2019-01-15 NOTE — PROGRESS NOTE ADULT - ASSESSMENT
Pt with chronic trach due to CVA and poor mental status, dislodged trach but now presents with Sepsis, possible due to UTI vs aspiration..    Palliative note apprec--wants all treatments.  On Vanco now  Had gm pos cocci in blood culture--suspect contaminant  Should be able to return to [facility] if cleared by ID

## 2019-01-15 NOTE — PROGRESS NOTE ADULT - SUBJECTIVE AND OBJECTIVE BOX
Patient is a 73y old  Female who presents with a chief complaint of PNA (15 Lei 2019 01:04)      INTERVAL HPI/OVERNIGHT EVENTS:   Black fluid coming from PEG.   Patient witnessed having episodes of muscle rigidity, with eyes open and starting at fixed point.  associated with tachycardia to 140-150s and tachypnea to 40s.  SP02 remained 100% throughout.     MEDICATIONS  (STANDING):  ALBUTerol    0.083% 2.5 milliGRAM(s) Nebulizer every 6 hours  artificial  tears Solution 1 Drop(s) Both EYES three times a day  dextrose 5%. 1000 milliLiter(s) (50 mL/Hr) IV Continuous <Continuous>  dextrose 50% Injectable 12.5 Gram(s) IV Push once  dextrose 50% Injectable 25 Gram(s) IV Push once  dextrose 50% Injectable 25 Gram(s) IV Push once  fentaNYL   Patch  12 MICROgram(s)/Hr 1 Patch Transdermal every 72 hours  insulin glargine Injectable (LANTUS) 20 Unit(s) SubCutaneous at bedtime  insulin lispro (HumaLOG) corrective regimen sliding scale   SubCutaneous every 6 hours  pantoprazole  Injectable 40 milliGRAM(s) IV Push two times a day  polyethylene glycol 3350 17 Gram(s) Oral daily  simethicone drops 60 milliGRAM(s) Oral four times a day  sodium chloride 0.65% Nasal 1 Spray(s) Both Nostrils four times a day    MEDICATIONS  (PRN):  acetaminophen   Tablet .. 650 milliGRAM(s) Oral every 6 hours PRN Temp greater or equal to 38C (100.4F), Mild Pain (1 - 3)  dextrose 40% Gel 15 Gram(s) Oral once PRN Blood Glucose LESS THAN 70 milliGRAM(s)/deciliter  glucagon  Injectable 1 milliGRAM(s) IntraMuscular once PRN Glucose LESS THAN 70 milligrams/deciliter      Allergies  codeine (Hives)    REVIEW OF SYSTEMS:  CONSTITUTIONAL: No fever, weight loss, or fatigue  EYES: No eye pain, visual disturbances, or discharge  ENMT:  No difficulty hearing, tinnitus, vertigo; No sinus or throat pain  NECK: No pain or stiffness  BREASTS: No pain, masses, or nipple discharge  RESPIRATORY: No cough, wheezing, chills or hemoptysis; No shortness of breath  CARDIOVASCULAR: No chest pain, palpitations, lightheadedness, or leg swelling  GASTROINTESTINAL: No abdominal or epigastric pain. No nausea, vomiting, or hematemesis; No diarrhea or constipation. No melena or hematochezia.  GENITOURINARY: No dysuria, frequency, hematuria, or incontinence  NEUROLOGICAL: No headaches, memory loss, vertigo, loss of strength, numbness, or tremors  SKIN: No itching, burning, rashes, or lesions   LYMPH NODES: No enlarged glands  ENDOCRINE: No heat or cold intolerance; No hair loss; No polydipsia or polyuria  MUSCULOSKELETAL: No joint pain or swelling; No muscle, back, or extremity pain  PSYCHIATRIC: No depression, anxiety, or mood swings  HEME/LYMPH: No easy bruising, or bleeding gums  ALLERGY AND IMMUNOLOGIC: No hives or eczema    Vital Signs Last 24 Hrs  T(C): 37.1 (15 Lei 2019 12:56), Max: 37.2 (14 Jan 2019 16:01)  T(F): 98.7 (15 Lei 2019 12:56), Max: 98.9 (14 Jan 2019 16:01)  HR: 90 (15 Lei 2019 13:27) (65 - 102)  BP: 146/58 (15 Lei 2019 08:00) (105/47 - 146/58)  BP(mean): 83 (15 Lei 2019 08:00) (64 - 84)  RR: 25 (15 Lei 2019 08:00) (8 - 25)  SpO2: 99% (15 Lei 2019 13:27) (98% - 100%)    PHYSICAL EXAM:  GENERAL: NAD, well-groomed, well-developed  HEAD:  Atraumatic, Normocephalic  EYES: EOMI, PERRLA, conjunctiva and sclera clear  ENMT: Moist mucous membranes, Good dentition, No lesions; No tonsillar erythema, exudates, or enlargement  NECK: Supple, No JVD, Normal thyroid  NERVOUS SYSTEM:  Alert & Oriented X3, Good concentration; All 4 extremities mobile, no gross sensory deficits.   CHEST/LUNG: Clear to auscultation bilaterally; No rales, rhonchi, wheezing, or rubs  HEART: Regular rate and rhythm; No murmurs, rubs, or gallops  ABDOMEN: Soft, Nontender, Nondistended; Bowel sounds present  EXTREMITIES:  2+ Peripheral Pulses, No clubbing, cyanosis, or edema  LYMPH: No lymphadenopathy noted  SKIN: No rashes or lesions    LABS:      Ca    8.4        14 Jan 2019 06:48          CAPILLARY BLOOD GLUCOSE      POCT Blood Glucose.: 95 mg/dL (15 Lei 2019 12:38)  POCT Blood Glucose.: 127 mg/dL (15 Lei 2019 06:43)  POCT Blood Glucose.: 136 mg/dL (14 Jan 2019 23:10)  POCT Blood Glucose.: 129 mg/dL (14 Jan 2019 18:22)      RADIOLOGY & ADDITIONAL TESTS:    Imaging Personally Reviewed:  [ ] YES     Consultant(s) Notes Reviewed:      Care Discussed with Consultants/Other Providers:    Advanced Directives: [ ] DNR  [ ] No feeding tube  [ ] MOLST in chart  [ ] MOLST completed today  [ ] Unknown Patient is a 73y old  Female who presents with a chief complaint of PNA (15 Lei 2019 01:04)      INTERVAL HPI/OVERNIGHT EVENTS:   Black fluid coming from PEG.   Patient witnessed having episodes of muscle rigidity, with eyes open and starting at fixed point.  associated with tachycardia to 140-150s and tachypnea to 40s.  SP02 remained 100% throughout.     MEDICATIONS  (STANDING):  ALBUTerol    0.083% 2.5 milliGRAM(s) Nebulizer every 6 hours  artificial  tears Solution 1 Drop(s) Both EYES three times a day  dextrose 5%. 1000 milliLiter(s) (50 mL/Hr) IV Continuous <Continuous>  dextrose 50% Injectable 12.5 Gram(s) IV Push once  dextrose 50% Injectable 25 Gram(s) IV Push once  dextrose 50% Injectable 25 Gram(s) IV Push once  fentaNYL   Patch  12 MICROgram(s)/Hr 1 Patch Transdermal every 72 hours  insulin glargine Injectable (LANTUS) 20 Unit(s) SubCutaneous at bedtime  insulin lispro (HumaLOG) corrective regimen sliding scale   SubCutaneous every 6 hours  pantoprazole  Injectable 40 milliGRAM(s) IV Push two times a day  polyethylene glycol 3350 17 Gram(s) Oral daily  simethicone drops 60 milliGRAM(s) Oral four times a day  sodium chloride 0.65% Nasal 1 Spray(s) Both Nostrils four times a day    MEDICATIONS  (PRN):  acetaminophen   Tablet .. 650 milliGRAM(s) Oral every 6 hours PRN Temp greater or equal to 38C (100.4F), Mild Pain (1 - 3)  dextrose 40% Gel 15 Gram(s) Oral once PRN Blood Glucose LESS THAN 70 milliGRAM(s)/deciliter  glucagon  Injectable 1 milliGRAM(s) IntraMuscular once PRN Glucose LESS THAN 70 milligrams/deciliter      Allergies  codeine (Hives)    REVIEW OF SYSTEMS:  unable to obtain    Vital Signs Last 24 Hrs  T(C): 37.1 (15 Lei 2019 12:56), Max: 37.2 (14 Jan 2019 16:01)  T(F): 98.7 (15 Lei 2019 12:56), Max: 98.9 (14 Jan 2019 16:01)  HR: 90 (15 Lei 2019 13:27) (65 - 102)  BP: 146/58 (15 Lei 2019 08:00) (105/47 - 146/58)  BP(mean): 83 (15 Lei 2019 08:00) (64 - 84)  RR: 25 (15 Lei 2019 08:00) (8 - 25)  SpO2: 99% (15 Lei 2019 13:27) (98% - 100%)    PHYSICAL EXAM:  GENERAL: NAD, well-groomed, well-developed  HEAD:  Atraumatic, Normocephalic  EYES: conjunctiva and sclera clear  ENMT: Moist mucous membranes  NECK: Supple, No JVD, Normal thyroid  NERVOUS SYSTEM:  looks toward voice, withdraws from pain.  contracted.    CHEST/LUNG: Clear to auscultation bilaterally;   HEART: Regular rate and rhythm;  ABDOMEN: Soft, Nontender, Nondistended; Bowel sounds present  EXTREMITIES:  2+ Peripheral Pulses, No clubbing, cyanosis, or edema    LABS:      Ca    8.4        14 Jan 2019 06:48      CAPILLARY BLOOD GLUCOSE  POCT Blood Glucose.: 95 mg/dL (15 Lei 2019 12:38)  POCT Blood Glucose.: 127 mg/dL (15 Lei 2019 06:43)  POCT Blood Glucose.: 136 mg/dL (14 Jan 2019 23:10)  POCT Blood Glucose.: 129 mg/dL (14 Jan 2019 18:22)      RADIOLOGY & ADDITIONAL TESTS:    Imaging Personally Reviewed:  [x ] YES   abd xray - bowel gas.     Consultant(s) Notes Reviewed:  GI, ID, pulm,     Care Discussed with Consultants/Other Providers: Dr Dupree - GI consult, Dr nash - neuro consult    Advanced Directives: [ ] DNR  [ ] No feeding tube  [ ] MOLST in chart  [ ] MOLST completed today  [ ] Unknown

## 2019-01-15 NOTE — CONSULT NOTE ADULT - PROBLEM SELECTOR PROBLEM 1
R/O Acute respiratory failure with hypoxia
Advanced directives, counseling/discussion
Coffee ground emesis
Tracheostomy in place

## 2019-01-15 NOTE — CONSULT NOTE ADULT - CONSULT REQUESTED DATE/TIME
11-Jan-2019 13:27
11-Jan-2019 18:12
13-Jan-2019 19:44
15-Lei-2019 11:22
15-Lei-2019 16:01
11-Jan-2019 10:03

## 2019-01-15 NOTE — PROVIDER CONTACT NOTE (CRITICAL VALUE NOTIFICATION) - ASSESSMENT
Vital signs stable
afebrile, VSS
pt is afebrile, bp 134/67, pulse 80  ivf NS @100 ml/hr in progress, on zosyn

## 2019-01-15 NOTE — PROGRESS NOTE ADULT - SUBJECTIVE AND OBJECTIVE BOX
ID Progress note    Name: BREA BECKHAM  Age: 73y  Gender: Female  MRN: [id number]    Interval History-- Events noted, periods of tachycardia and tachypnea , afebrile . Lab reported 1 out of 2 sets of BC as GPCC , CCPA started Vancomycin after blood cs were repeated   Notes reviewed    Past Medical History--  Respiratory failure  Constipation  GERD (gastroesophageal reflux disease)  Hypertension  Respiratory failure  Diabetes mellitus  Aphasic stroke  Dementia of frontal lobe type  Tracheostomy in place  Gastrostomy in place  Hx of appendectomy      For details regarding the patient's social history, family history, and other miscellaneous elements, please refer the initial infectious diseases consultation and/or the admitting history and physical examination for this admission.    Allergies--  Allergies    codeine (Hives)    Intolerances        Medications--  Antibiotics:  vancomycin  IVPB 1000 milliGRAM(s) IV Intermittent every 12 hours    Immunologic:    Other:  acetaminophen   Tablet .. PRN  ALBUTerol    0.083%  artificial  tears Solution  dextrose 40% Gel PRN  dextrose 5%.  dextrose 50% Injectable  dextrose 50% Injectable  dextrose 50% Injectable  enoxaparin Injectable  famotidine    Tablet  fentaNYL   Patch  12 MICROgram(s)/Hr  glucagon  Injectable PRN  insulin glargine Injectable (LANTUS)  insulin lispro (HumaLOG) corrective regimen sliding scale  mineral oil enema  polyethylene glycol 3350  simethicone drops  sodium chloride 0.65% Nasal      Review of Systems--  Review of systems unable to be obtained secondary to clinical condition.    Physical Examination--    Vital Signs: T(F): 98.7 (01-15-19 @ 04:04), Max: 98.9 (01-14-19 @ 16:01)  HR: 92 (01-15-19 @ 07:58)  BP: 127/46 (01-15-19 @ 06:10)  RR: 19 (01-15-19 @ 06:10)  SpO2: 98% (01-15-19 @ 07:58)  Wt(kg): --  General: Nontoxic-appearing Female in no acute distress.  HEENT: AT/NC. PERRL. EOMI. Anicteric. Conjunctiva pink and moist. Oropharynx clear. Dentition fair. Trach +   Neck: Not rigid. No sense of mass.  Nodes: None palpable.  Lungs: Clear bilaterally without rales, wheezing or rhonchi  Heart: Regular rate and rhythm. No Murmur. No rub. No gallop. No palpable thrill.  Abdomen: Bowel sounds present and normoactive. Soft. Nondistended. Nontender. No sense of mass. No organomegaly.  Back: No spinal tenderness. No costovertebral angle tenderness.   Extremities: No cyanosis or clubbing. No edema.   Skin: Warm. Dry. Good turgor. No rash. No vasculitic stigmata.  Psychiatric: Appropriate affect and mood for situation.         Laboratory Studies--  CBC                        11.8   6.61  )-----------( 178      ( 14 Jan 2019 06:48 )             37.2       Chemistries  01-14    143  |  108  |  10  ----------------------------<  133<H>  3.6   |  27  |  0.83    Ca    8.4      14 Jan 2019 06:48        Culture Data    Culture - Blood (collected 12 Jan 2019 19:12)  Source: .Blood Blood-Peripheral  Preliminary Report (13 Jan 2019 20:01):    No growth to date.    Culture - Blood (collected 12 Jan 2019 19:12)  Source: .Blood Blood-Peripheral  Gram Stain (15 Lei 2019 01:03):    Growth in aerobic bottle: Gram Positive Cocci in Clusters  Preliminary Report (15 Lei 2019 01:03):    Growth in aerobic bottle: Gram Positive Cocci in Clusters    Culture - Sputum (collected 11 Jan 2019 17:17)  Source: .Sputum Sputum  Gram Stain (11 Jan 2019 22:40):    Few polymorphonuclear leukocytes per low power field    No Squamous epithelial cells per low power field    Rare Gram Positive Rods per oil power field  Final Report (13 Jan 2019 19:50):    Normal Respiratory Elvira present    Culture - Blood (collected 11 Jan 2019 12:12)  Source: .Blood Blood-Peripheral  Gram Stain (13 Jan 2019 01:45):    Growth in aerobic bottle: Gram Positive Cocci in Clusters    Growth in anaerobic bottle: Gram Positive Cocci in Clusters  Final Report (14 Jan 2019 11:28):    Growth in aerobic and anaerobic bottles: Coag Negative Staphylococcus    Single set isolate, possible contaminant. Contact    Microbiology if susceptibility testing clinically    indicated. Multiple Morphological Strains    "Due to technical problems, Proteus sp. will Not be reported as part of    the BCID panel until further notice"    ***Blood Panel PCR results on this specimen are available    approximately 3 hours after the Gram stain result.***    Gram stain, PCR, and/or culture results may not always    correspond due to difference in methodologies.    ************************************************************    This PCR assay was performed using [employer].    The following targets are tested for: Enterococcus,    vancomycin resistant enterococci, Listeria monocytogenes,    coagulase negative staphylococci, S. aureus,    methicillin resistant S. aureus, Streptococcus agalactiae    (Group B), S. pneumoniae, S. pyogenes (Group A),    Acinetobacter baumannii, Enterobacter cloacae, E. coli,    Klebsiella oxytoca, K. pneumoniae, Proteus sp.,    Serratia marcescens, Haemophilus influenzae,    Neisseria meningitidis, Pseudomonas aeruginosa, Candida    albicans, C. glabrata, C krusei, C parapsilosis,    C. tropicalis and the KPC resistance gene.  Organism: Blood Culture PCR (14 Jan 2019 11:28)  Organism: Blood Culture PCR (14 Jan 2019 11:28)    Culture - Blood (collected 11 Jan 2019 12:12)  Source: .Blood Blood-Peripheral  Gram Stain (13 Jan 2019 22:38):    Growth in aerobic bottle: Gram Positive Cocci in Clusters  Preliminary Report (14 Jan 2019 17:38):    Growth in aerobic bottle: Staphylococcus capitis    Culture - Urine (collected 11 Jan 2019 11:03)  Source: .Urine Clean Catch (Midstream)  Final Report (12 Jan 2019 12:59):    Culture grew 3 or more types of organisms which indicate    collection contamination; consider recollection only if clinically    indicated.          Radiology:  Xray Chest 1 View- PORTABLE-Routine (01.12.19 @ 07:18) >  Findings: The patient is status post tracheostomy. The patient's right   hand and arm obscure the right lung base limiting evaluation.    The lungs are grossly clear. The heart size is at the upper limits of  normal. There are mild multilevel degenerative changes of the thoracic   spine.    IMPRESSION: As above, no interval change.      Assessment--    73F advanced dementia, CVA, functional quadriplegia, trach collar, dysphagia with peg, nonverbal sent from [facility] with trach dislodged, replaced trach in ED, found to be febrile to 101 and WBC was 21K. Noted to lactate of 12. CXR clear and UA +.   Fever leukocytosis may be reactive to trach manipulation as all cs are negative and CXR is negative . Initial blood cs with Coag. negative staph likely skin contaminant , repeat blood cs  1 out of 2 positive for GPCC id pending     Suggestions--  - will dc antibiotics and observe as the blood cs likely skin contaminant   - if no fever , dc back to SNF in 24 hours   - trend cbc  - bowel regime       Continue with present regiment.  Appropriate use of antibiotics and adverse effects reviewed.      I have discussed the above plan of care with patient's family in detail. They expressed understanding of the treatment plan . Risks, benefits and alternatives discussed in detail. I have asked if they have any questions or concerns and appropriately addressed them to the best of my ability .      > 35 minutes spent in direct patient care reviewing  the notes, lab data/ imaging , discussion with multidisciplinary team. All questions were addressed and answered to the best of my capacity .    Thank you for allowing me to participate in the care of your patient .        Alejandrina Brantley MD  [phone]

## 2019-01-16 ENCOUNTER — TRANSCRIPTION ENCOUNTER (OUTPATIENT)
Age: 74
End: 2019-01-16

## 2019-01-16 VITALS
DIASTOLIC BLOOD PRESSURE: 66 MMHG | HEART RATE: 82 BPM | OXYGEN SATURATION: 100 % | SYSTOLIC BLOOD PRESSURE: 138 MMHG | RESPIRATION RATE: 27 BRPM

## 2019-01-16 LAB
ANION GAP SERPL CALC-SCNC: 8 MMOL/L — SIGNIFICANT CHANGE UP (ref 5–17)
BASOPHILS # BLD AUTO: 0.02 K/UL — SIGNIFICANT CHANGE UP (ref 0–0.2)
BASOPHILS NFR BLD AUTO: 0.3 % — SIGNIFICANT CHANGE UP (ref 0–2)
BUN SERPL-MCNC: 17 MG/DL — SIGNIFICANT CHANGE UP (ref 7–23)
CALCIUM SERPL-MCNC: 8.6 MG/DL — SIGNIFICANT CHANGE UP (ref 8.4–10.5)
CHLORIDE SERPL-SCNC: 106 MMOL/L — SIGNIFICANT CHANGE UP (ref 96–108)
CO2 SERPL-SCNC: 28 MMOL/L — SIGNIFICANT CHANGE UP (ref 22–31)
CREAT SERPL-MCNC: 0.85 MG/DL — SIGNIFICANT CHANGE UP (ref 0.5–1.3)
EOSINOPHIL # BLD AUTO: 0.17 K/UL — SIGNIFICANT CHANGE UP (ref 0–0.5)
EOSINOPHIL NFR BLD AUTO: 2.8 % — SIGNIFICANT CHANGE UP (ref 0–6)
GLUCOSE BLDC GLUCOMTR-MCNC: 132 MG/DL — HIGH (ref 70–99)
GLUCOSE BLDC GLUCOMTR-MCNC: 143 MG/DL — HIGH (ref 70–99)
GLUCOSE SERPL-MCNC: 123 MG/DL — HIGH (ref 70–99)
HCT VFR BLD CALC: 35.6 % — SIGNIFICANT CHANGE UP (ref 34.5–45)
HGB BLD-MCNC: 11.5 G/DL — SIGNIFICANT CHANGE UP (ref 11.5–15.5)
IMM GRANULOCYTES NFR BLD AUTO: 0.3 % — SIGNIFICANT CHANGE UP (ref 0–1.5)
LYMPHOCYTES # BLD AUTO: 1.75 K/UL — SIGNIFICANT CHANGE UP (ref 1–3.3)
LYMPHOCYTES # BLD AUTO: 29.3 % — SIGNIFICANT CHANGE UP (ref 13–44)
MAGNESIUM SERPL-MCNC: 2.3 MG/DL — SIGNIFICANT CHANGE UP (ref 1.6–2.6)
MCHC RBC-ENTMCNC: 27.5 PG — SIGNIFICANT CHANGE UP (ref 27–34)
MCHC RBC-ENTMCNC: 32.3 GM/DL — SIGNIFICANT CHANGE UP (ref 32–36)
MCV RBC AUTO: 85.2 FL — SIGNIFICANT CHANGE UP (ref 80–100)
MONOCYTES # BLD AUTO: 0.58 K/UL — SIGNIFICANT CHANGE UP (ref 0–0.9)
MONOCYTES NFR BLD AUTO: 9.7 % — SIGNIFICANT CHANGE UP (ref 2–14)
NEUTROPHILS # BLD AUTO: 3.44 K/UL — SIGNIFICANT CHANGE UP (ref 1.8–7.4)
NEUTROPHILS NFR BLD AUTO: 57.6 % — SIGNIFICANT CHANGE UP (ref 43–77)
NRBC # BLD: 0 /100 WBCS — SIGNIFICANT CHANGE UP (ref 0–0)
PLATELET # BLD AUTO: 201 K/UL — SIGNIFICANT CHANGE UP (ref 150–400)
POTASSIUM SERPL-MCNC: 3.4 MMOL/L — LOW (ref 3.5–5.3)
POTASSIUM SERPL-SCNC: 3.4 MMOL/L — LOW (ref 3.5–5.3)
RBC # BLD: 4.18 M/UL — SIGNIFICANT CHANGE UP (ref 3.8–5.2)
RBC # FLD: 15.1 % — HIGH (ref 10.3–14.5)
SODIUM SERPL-SCNC: 142 MMOL/L — SIGNIFICANT CHANGE UP (ref 135–145)
WBC # BLD: 5.98 K/UL — SIGNIFICANT CHANGE UP (ref 3.8–10.5)
WBC # FLD AUTO: 5.98 K/UL — SIGNIFICANT CHANGE UP (ref 3.8–10.5)

## 2019-01-16 PROCEDURE — 82803 BLOOD GASES ANY COMBINATION: CPT

## 2019-01-16 PROCEDURE — 36415 COLL VENOUS BLD VENIPUNCTURE: CPT

## 2019-01-16 PROCEDURE — 83605 ASSAY OF LACTIC ACID: CPT

## 2019-01-16 PROCEDURE — 85027 COMPLETE CBC AUTOMATED: CPT

## 2019-01-16 PROCEDURE — 94760 N-INVAS EAR/PLS OXIMETRY 1: CPT

## 2019-01-16 PROCEDURE — 87070 CULTURE OTHR SPECIMN AEROBIC: CPT

## 2019-01-16 PROCEDURE — 99285 EMERGENCY DEPT VISIT HI MDM: CPT | Mod: 25

## 2019-01-16 PROCEDURE — 81001 URINALYSIS AUTO W/SCOPE: CPT

## 2019-01-16 PROCEDURE — 87640 STAPH A DNA AMP PROBE: CPT

## 2019-01-16 PROCEDURE — 71045 X-RAY EXAM CHEST 1 VIEW: CPT

## 2019-01-16 PROCEDURE — 80048 BASIC METABOLIC PNL TOTAL CA: CPT

## 2019-01-16 PROCEDURE — 87086 URINE CULTURE/COLONY COUNT: CPT

## 2019-01-16 PROCEDURE — 87186 SC STD MICRODIL/AGAR DIL: CPT

## 2019-01-16 PROCEDURE — 80053 COMPREHEN METABOLIC PANEL: CPT

## 2019-01-16 PROCEDURE — 82962 GLUCOSE BLOOD TEST: CPT

## 2019-01-16 PROCEDURE — 93005 ELECTROCARDIOGRAM TRACING: CPT

## 2019-01-16 PROCEDURE — 83735 ASSAY OF MAGNESIUM: CPT

## 2019-01-16 PROCEDURE — 99239 HOSP IP/OBS DSCHRG MGMT >30: CPT

## 2019-01-16 PROCEDURE — 87040 BLOOD CULTURE FOR BACTERIA: CPT

## 2019-01-16 PROCEDURE — 82271 OCCULT BLOOD OTHER SOURCES: CPT

## 2019-01-16 PROCEDURE — 87641 MR-STAPH DNA AMP PROBE: CPT

## 2019-01-16 PROCEDURE — 36600 WITHDRAWAL OF ARTERIAL BLOOD: CPT

## 2019-01-16 PROCEDURE — 96372 THER/PROPH/DIAG INJ SC/IM: CPT

## 2019-01-16 PROCEDURE — 87631 RESP VIRUS 3-5 TARGETS: CPT

## 2019-01-16 PROCEDURE — 83986 ASSAY PH BODY FLUID NOS: CPT

## 2019-01-16 PROCEDURE — 87150 DNA/RNA AMPLIFIED PROBE: CPT

## 2019-01-16 PROCEDURE — 74018 RADEX ABDOMEN 1 VIEW: CPT

## 2019-01-16 PROCEDURE — 94640 AIRWAY INHALATION TREATMENT: CPT

## 2019-01-16 RX ORDER — INSULIN GLARGINE 100 [IU]/ML
20 INJECTION, SOLUTION SUBCUTANEOUS
Qty: 0 | Refills: 0 | DISCHARGE
Start: 2019-01-16

## 2019-01-16 RX ORDER — FENTANYL CITRATE 50 UG/ML
1 INJECTION INTRAVENOUS
Qty: 0 | Refills: 0 | COMMUNITY

## 2019-01-16 RX ORDER — ALBUTEROL 90 UG/1
1 AEROSOL, METERED ORAL
Qty: 0 | Refills: 0 | DISCHARGE
Start: 2019-01-16

## 2019-01-16 RX ORDER — FAMOTIDINE 10 MG/ML
1 INJECTION INTRAVENOUS
Qty: 0 | Refills: 0 | COMMUNITY

## 2019-01-16 RX ORDER — SIMETHICONE 80 MG/1
0.9 TABLET, CHEWABLE ORAL
Qty: 0 | Refills: 0 | DISCHARGE
Start: 2019-01-16

## 2019-01-16 RX ORDER — POTASSIUM CHLORIDE 20 MEQ
40 PACKET (EA) ORAL ONCE
Qty: 0 | Refills: 0 | Status: COMPLETED | OUTPATIENT
Start: 2019-01-16 | End: 2019-01-16

## 2019-01-16 RX ORDER — SODIUM CHLORIDE 0.65 %
1 AEROSOL, SPRAY (ML) NASAL
Qty: 0 | Refills: 0 | DISCHARGE
Start: 2019-01-16

## 2019-01-16 RX ORDER — FENTANYL CITRATE 50 UG/ML
1 INJECTION INTRAVENOUS
Qty: 0 | Refills: 0 | DISCHARGE
Start: 2019-01-16

## 2019-01-16 RX ORDER — SODIUM CHLORIDE 0.65 %
2 AEROSOL, SPRAY (ML) NASAL
Qty: 0 | Refills: 0 | COMMUNITY

## 2019-01-16 RX ORDER — ALBUTEROL 90 UG/1
3 AEROSOL, METERED ORAL
Qty: 0 | Refills: 0 | COMMUNITY

## 2019-01-16 RX ORDER — SIMETHICONE 80 MG/1
1.2 TABLET, CHEWABLE ORAL
Qty: 0 | Refills: 0 | COMMUNITY

## 2019-01-16 RX ORDER — HUMAN INSULIN 100 [IU]/ML
18 INJECTION, SUSPENSION SUBCUTANEOUS
Qty: 0 | Refills: 0 | COMMUNITY

## 2019-01-16 RX ADMIN — Medication 40 MILLIEQUIVALENT(S): at 11:52

## 2019-01-16 RX ADMIN — Medication 1 SPRAY(S): at 05:40

## 2019-01-16 RX ADMIN — FENTANYL CITRATE 1 PATCH: 50 INJECTION INTRAVENOUS at 06:41

## 2019-01-16 RX ADMIN — ALBUTEROL 2.5 MILLIGRAM(S): 90 AEROSOL, METERED ORAL at 07:22

## 2019-01-16 RX ADMIN — SIMETHICONE 60 MILLIGRAM(S): 80 TABLET, CHEWABLE ORAL at 05:40

## 2019-01-16 RX ADMIN — POLYETHYLENE GLYCOL 3350 17 GRAM(S): 17 POWDER, FOR SOLUTION ORAL at 11:52

## 2019-01-16 RX ADMIN — PANTOPRAZOLE SODIUM 40 MILLIGRAM(S): 20 TABLET, DELAYED RELEASE ORAL at 11:52

## 2019-01-16 RX ADMIN — Medication 1 DROP(S): at 16:16

## 2019-01-16 RX ADMIN — Medication 1 DROP(S): at 05:39

## 2019-01-16 RX ADMIN — ALBUTEROL 2.5 MILLIGRAM(S): 90 AEROSOL, METERED ORAL at 00:34

## 2019-01-16 RX ADMIN — SIMETHICONE 60 MILLIGRAM(S): 80 TABLET, CHEWABLE ORAL at 11:53

## 2019-01-16 RX ADMIN — ALBUTEROL 2.5 MILLIGRAM(S): 90 AEROSOL, METERED ORAL at 13:17

## 2019-01-16 RX ADMIN — Medication 1 SPRAY(S): at 11:53

## 2019-01-16 NOTE — PROGRESS NOTE ADULT - SUBJECTIVE AND OBJECTIVE BOX
Patient is a 73y old  Female who presents with a chief complaint of PNA (15 Lei 2019 01:04)      INTERVAL HPI/OVERNIGHT EVENTS: no new events.  no signs of gi bleed today.     MEDICATIONS  (STANDING):  ALBUTerol    0.083% 2.5 milliGRAM(s) Nebulizer every 6 hours  artificial  tears Solution 1 Drop(s) Both EYES three times a day  dextrose 5% + sodium chloride 0.45% with potassium chloride 20 mEq/L 1000 milliLiter(s) (100 mL/Hr) IV Continuous <Continuous>  dextrose 5%. 1000 milliLiter(s) (50 mL/Hr) IV Continuous <Continuous>  dextrose 50% Injectable 12.5 Gram(s) IV Push once  dextrose 50% Injectable 25 Gram(s) IV Push once  dextrose 50% Injectable 25 Gram(s) IV Push once  fentaNYL   Patch  12 MICROgram(s)/Hr 1 Patch Transdermal every 72 hours  insulin glargine Injectable (LANTUS) 20 Unit(s) SubCutaneous at bedtime  insulin lispro (HumaLOG) corrective regimen sliding scale   SubCutaneous every 6 hours  pantoprazole  Injectable 40 milliGRAM(s) IV Push two times a day  polyethylene glycol 3350 17 Gram(s) Oral daily  simethicone drops 60 milliGRAM(s) Oral four times a day  sodium chloride 0.65% Nasal 1 Spray(s) Both Nostrils four times a day    MEDICATIONS  (PRN):  acetaminophen   Tablet .. 650 milliGRAM(s) Oral every 6 hours PRN Temp greater or equal to 38C (100.4F), Mild Pain (1 - 3)  dextrose 40% Gel 15 Gram(s) Oral once PRN Blood Glucose LESS THAN 70 milliGRAM(s)/deciliter  glucagon  Injectable 1 milliGRAM(s) IntraMuscular once PRN Glucose LESS THAN 70 milligrams/deciliter      Allergies  codeine (Hives)    REVIEW OF SYSTEMS:  unable to obtain    Vital Signs Last 24 Hrs  T(C): 36.6 (16 Jan 2019 04:05), Max: 37.3 (15 Lei 2019 16:10)  T(F): 97.9 (16 Jan 2019 04:05), Max: 99.1 (15 Lei 2019 16:10)  HR: 69 (16 Jan 2019 10:00) (69 - 94)  BP: 116/56 (16 Jan 2019 10:00) (116/56 - 147/82)  BP(mean): 73 (16 Jan 2019 10:00) (63 - 100)  RR: 19 (16 Jan 2019 10:00) (14 - 24)  SpO2: 100% (16 Jan 2019 10:00) (98% - 100%)    PHYSICAL EXAM:  GENERAL: NAD,   EYES: conjunctiva and sclera clear  ENMT: Moist mucous membranes, Trach in place  NECK: Supple, No JVD  NERVOUS SYSTEM:  Awake, looking around, contractures.   CHEST/LUNG: Clear to auscultation bilaterally  HEART: Regular rate and rhythm;   ABDOMEN: Soft, Nontender, Nondistended; Bowel sounds present; +peg  EXTREMITIES:  2+ Peripheral Pulses,       LABS:                        11.5   5.98  )-----------( 201      ( 16 Jan 2019 06:36 )             35.6     16 Jan 2019 06:36    142    |  106    |  17     ----------------------------<  123    3.4     |  28     |  0.85     Ca    8.6        16 Jan 2019 06:36  Mg     2.3       16 Jan 2019 06:36          CAPILLARY BLOOD GLUCOSE  POCT Blood Glucose.: 132 mg/dL (16 Jan 2019 12:09)  POCT Blood Glucose.: 143 mg/dL (16 Jan 2019 05:36)  POCT Blood Glucose.: 116 mg/dL (15 Lei 2019 23:17)  POCT Blood Glucose.: 97 mg/dL (15 Lei 2019 18:49)      RADIOLOGY & ADDITIONAL TESTS:    Imaging Personally Reviewed:  [ ] YES     Consultant(s) Notes Reviewed:  neuro, gi, pulm, ID    Care Discussed with Consultants/Other Providers:    Advanced Directives: [ ] DNR  [ ] No feeding tube  [ ] MOLST in chart  [ ] MOLST completed today  [ ] Unknown

## 2019-01-16 NOTE — DISCHARGE NOTE ADULT - CARE PLAN
Principal Discharge DX:	Pneumonitis  Goal:	no more aspiration  Assessment and plan of treatment:	continue trach care with aspiration precautions.  Secondary Diagnosis:	Tracheostomy in place  Assessment and plan of treatment:	Planned for trach revision at [facility] on Jan 23 at 730AM  Secondary Diagnosis:	Gastroesophageal reflux disease, esophagitis presence not specified  Assessment and plan of treatment:	dc famotidine, start protonix and carafate.  f/u with Dr Dupree  Secondary Diagnosis:	Pre-operative examination  Assessment and plan of treatment:	Patient has no medical contraindications to proceed for planned ENT procedure.   Should have half normal dose of Insulin (Lantus) the night before.   Hold Tube feeds at Midnight  In AM can get meds via peg early prior to transfer to the hospital (protonix, carafate, simethicone)

## 2019-01-16 NOTE — DISCHARGE NOTE ADULT - PLAN OF CARE
no more aspiration continue trach care with aspiration precautions. Planned for trach revision at [facility] on Jan 23 at 730AM dc famotidine, start protonix and carafate.  f/u with Dr Dupree Patient has no medical contraindications to proceed for planned ENT procedure.   Should have half normal dose of Insulin (Lantus) the night before.   Hold Tube feeds at Midnight  In AM can get meds via peg early prior to transfer to the hospital (protonix, carafate, simethicone)

## 2019-01-16 NOTE — PROGRESS NOTE ADULT - PROBLEM SELECTOR PLAN 1
chr resp failure  trach and peg  oral and skin care  GI bleed eval  HOB elev  asp prec  planned for trach change on 1/23 at [facility]  supportive medical regimen in effect

## 2019-01-16 NOTE — DISCHARGE NOTE ADULT - PROVIDER TOKENS
FREE:[LAST:[gurdeep],FIRST:[rajiv],PHONE:[(   )    -],FAX:[(   )    -]],TOKEN:'[id number]:'[id number]:'[id number]'

## 2019-01-16 NOTE — DISCHARGE NOTE ADULT - MEDICATION SUMMARY - MEDICATIONS TO STOP TAKING
I will STOP taking the medications listed below when I get home from the hospital:    Pepcid 40 mg oral tablet  -- 1 tab(s) by gastrostomy tube once a day    HumuLIN N 100 units/mL subcutaneous suspension  -- 18 unit(s) subcutaneous 2 times a day

## 2019-01-16 NOTE — PROGRESS NOTE ADULT - PROBLEM SELECTOR PROBLEM 1
Acute respiratory failure with hypoxia
Coffee ground emesis
R/O Acute respiratory failure with hypoxia
Respiratory failure
Acute respiratory failure with hypoxia
R/O Acute respiratory failure with hypoxia

## 2019-01-16 NOTE — PROGRESS NOTE ADULT - SUBJECTIVE AND OBJECTIVE BOX
ID Progress note     Name: BREA BECKHAM  Age: 73y  Gender: Female  MRN: [id number]    Interval History-- Events noted , dc held as patient had abnormal jerky movements with muscle rigidity which as per  is not new especially when patient is constipated . At present comfortable. She had large BM after enema and had one BM this am . Overnight no new events . As per  she needs a revision of tracheostomy , which is to  be done as out patient once scheduled by ENT at [facility]  . Afebrile   Notes reviewed    Past Medical History--  Respiratory failure  Constipation  GERD (gastroesophageal reflux disease)  Hypertension  Respiratory failure  Diabetes mellitus  Aphasic stroke  Dementia of frontal lobe type  Tracheostomy in place  Gastrostomy in place  Hx of appendectomy      For details regarding the patient's social history, family history, and other miscellaneous elements, please refer the initial infectious diseases consultation and/or the admitting history and physical examination for this admission.    Allergies--  Allergies    codeine (Hives)    Intolerances        Medications--  Antibiotics:    Immunologic:    Other:  acetaminophen   Tablet .. PRN  ALBUTerol    0.083%  artificial  tears Solution  dextrose 40% Gel PRN  dextrose 5% + sodium chloride 0.45% with potassium chloride 20 mEq/L  dextrose 5%.  dextrose 50% Injectable  dextrose 50% Injectable  dextrose 50% Injectable  fentaNYL   Patch  12 MICROgram(s)/Hr  glucagon  Injectable PRN  insulin glargine Injectable (LANTUS)  insulin lispro (HumaLOG) corrective regimen sliding scale  pantoprazole  Injectable  polyethylene glycol 3350  simethicone drops  sodium chloride 0.65% Nasal      Review of Systems--  Review of systems unable to be obtained secondary to clinical condition.    Physical Examination--    Vital Signs: T(F): 97.9 (01-16-19 @ 04:05), Max: 99.1 (01-15-19 @ 16:10)  HR: 71 (01-16-19 @ 07:22)  BP: 142/61 (01-16-19 @ 06:00)  RR: 24 (01-16-19 @ 06:00)  SpO2: 100% (01-16-19 @ 07:22)  Wt(kg): --   General: Nontoxic-appearing Female in no acute distress.  HEENT: AT/NC. PERRL. EOMI. Anicteric. Conjunctiva pink and moist. Oropharynx clear. Dentition fair. Trach +   Neck: Not rigid. No sense of mass.  Nodes: None palpable.  Lungs: Clear bilaterally without rales, wheezing or rhonchi  Heart: Regular rate and rhythm. No Murmur. No rub. No gallop. No palpable thrill.  Abdomen: Bowel sounds present and normoactive. Soft. Nondistended. Nontender. No sense of mass. No organomegaly.  Back: No spinal tenderness. No costovertebral angle tenderness.   Extremities: No cyanosis or clubbing. No edema.   Skin: Warm. Dry. Good turgor. No rash. No vasculitic stigmata.  Psychiatric: Appropriate affect and mood for situation.         Laboratory Studies--  CBC                        11.5   5.98  )-----------( 201      ( 16 Jan 2019 06:36 )             35.6       Chemistries  01-16    142  |  106  |  17  ----------------------------<  123<H>  3.4<L>   |  28  |  0.85    Ca    8.6      16 Jan 2019 06:36  Mg     2.3     01-16        Culture Data    Culture - Blood (collected 12 Jan 2019 19:12)  Source: .Blood Blood-Peripheral  Preliminary Report (13 Jan 2019 20:01):    No growth to date.    Culture - Blood (collected 12 Jan 2019 19:12)  Source: .Blood Blood-Peripheral  Gram Stain (15 Lei 2019 01:03):    Growth in aerobic bottle: Gram Positive Cocci in Clusters  Final Report (15 Lei 2019 22:39):    Growth in aerobic bottle: Coag Negative Staphylococcus "Susceptibilities    not performed"    Culture - Sputum (collected 11 Jan 2019 17:17)  Source: .Sputum Sputum  Gram Stain (11 Jan 2019 22:40):    Few polymorphonuclear leukocytes per low power field    No Squamous epithelial cells per low power field    Rare Gram Positive Rods per oil power field  Final Report (13 Jan 2019 19:50):    Normal Respiratory Elviar present    Culture - Blood (collected 11 Jan 2019 12:12)  Source: .Blood Blood-Peripheral  Gram Stain (13 Jan 2019 01:45):    Growth in aerobic bottle: Gram Positive Cocci in Clusters    Growth in anaerobic bottle: Gram Positive Cocci in Clusters  Final Report (14 Jan 2019 11:28):    Growth in aerobic and anaerobic bottles: Coag Negative Staphylococcus    Single set isolate, possible contaminant. Contact    Microbiology if susceptibility testing clinically    indicated. Multiple Morphological Strains    "Due to technical problems, Proteus sp. will Not be reported as part of    the BCID panel until further notice"    ***Blood Panel PCR results on this specimen are available    approximately 3 hours after the Gram stain result.***    Gram stain, PCR, and/or culture results may not always    correspond due to difference in methodologies.    ************************************************************    This PCR assay was performed using [employer].    The following targets are tested for: Enterococcus,    vancomycin resistant enterococci, Listeria monocytogenes,    coagulase negative staphylococci, S. aureus,    methicillin resistant S. aureus, Streptococcus agalactiae    (Group B), S. pneumoniae, S. pyogenes (Group A),    Acinetobacter baumannii, Enterobacter cloacae, E. coli,    Klebsiella oxytoca, K. pneumoniae, Proteus sp.,    Serratia marcescens, Haemophilus influenzae,    Neisseria meningitidis, Pseudomonas aeruginosa, Candida    albicans, C. glabrata, C krusei, C parapsilosis,    C. tropicalis and the KPC resistance gene.  Organism: Blood Culture PCR (14 Jan 2019 11:28)  Organism: Blood Culture PCR (14 Jan 2019 11:28)    Culture - Blood (collected 11 Jan 2019 12:12)  Source: .Blood Blood-Peripheral  Gram Stain (13 Jan 2019 22:38):    Growth in aerobic bottle: Gram Positive Cocci in Clusters  Final Report (15 Lei 2019 18:58):    Growth in aerobic bottle: Staphylococcus capitis  Organism: Staphylococcus capitis (15 Lei 2019 18:58)  Organism: Staphylococcus capitis (15 Lei 2019 18:58)    Culture - Urine (collected 11 Jan 2019 11:03)  Source: .Urine Clean Catch (Midstream)  Final Report (12 Jan 2019 12:59):    Culture grew 3 or more types of organisms which indicate    collection contamination; consider recollection only if clinically    indicated.      Radiology:  Xray Abdomen 1 View PORTABLE -Routine (01.15.19 @ 10:36) >  IMPRESSION:  Nonspecific bowel gas pattern with mildly distended bowel loops. G-tube   present overlying upper abdomen.    Nonspecific small radiopaque density overlies left pelvis.        No gross intraperitoneal free air.    Assessment--  73F advanced dementia, CVA, functional quadriplegia, trach collar, dysphagia with peg, nonverbal sent from [facility] with trach dislodged, replaced trach in ED, found to be febrile to 101 and WBC was 21K. Noted to lactate of 12. CXR clear and UA +.   Fever leukocytosis may be reactive to trach manipulation as all cs are negative and CXR is negative . Initial blood cs with Coag. negative staph likely skin contaminant , repeat blood cs  1 out of 2 positive for GPCC, identified as Caog neg Staph jaqueline skin contaminant     Suggestions--  - will observe off antibiotics  as the blood cs with Coag. neg staph is  likely skin contaminant   - stable for dc back to SNF   - trend cbc  - bowel regime   - ENT to schedule surgery for trach revision at [facility] as out patient       Continue with present regiment.  Appropriate use of antibiotics and adverse effects reviewed.    I have discussed the above plan of care with patient's family in detail. They expressed understanding of the treatment plan . Risks, benefits and alternatives discussed in detail. I have asked if they have any questions or concerns and appropriately addressed them to the best of my ability .      > 25 minutes spent in direct patient care reviewing  the notes, lab data/ imaging , discussion with multidisciplinary team. All questions were addressed and answered to the best of my capacity .    Thank you for allowing me to participate in the care of your patient .        Alejandrina Brantley MD  [phone]

## 2019-01-16 NOTE — DISCHARGE NOTE ADULT - CARE PROVIDER_API CALL
rajiv mackenzie  Phone: (   )    -  Fax: (   )    -    Sebastian Santos), Otolaryngology  [address]  [place] [address]  Phone: [phone]  Fax: [phone]    Farshad Dupree), Gastroenterology  [address] [place] [address]  Phone: [phone]  Fax: [phone]    Heriberto Andrea), Critical Care Medicine; HospicePalliative Medicine; Internal Medicine; Pulmonary Disease  [address] [place] [address]  Phone: [phone]  Fax: [phone]

## 2019-01-16 NOTE — PROGRESS NOTE ADULT - ASSESSMENT
Pt with chronic trach due to CVA and poor mental status, dislodged trach but now presents with Sepsis, possible due to UTI vs aspiration..    Palliative note apprec--wants all treatments.  On Vanco now  Had gm pos cocci in blood culture--suspect contaminant  Should be able to return to [facility]    Prognosis poor. Pt with chronic trach due to CVA and poor mental status, dislodged trach but now presents with Sepsis, possible due to UTI vs aspiration..    Palliative note apprec--wants all treatments.  On Vanco now  Had gm pos cocci in blood culture--suspect contaminant  Should be able to return to [facility]    Prognosis poor.    Pt is a stable as possible from pulmonary standpoint. for Tracheostomy revision [facility] 1/23/19

## 2019-01-16 NOTE — PROGRESS NOTE ADULT - SUBJECTIVE AND OBJECTIVE BOX
Date/Time Patient Seen:  		  Referring MD:   Data Reviewed	       Patient is a 73y old  Female who presents with a chief complaint of PNA (15 Lei 2019 01:04)      Subjective/HPI     PAST MEDICAL & SURGICAL HISTORY:  Respiratory failure  Constipation  GERD (gastroesophageal reflux disease)  Hypertension  Respiratory failure  Diabetes mellitus  Aphasic stroke  Dementia of frontal lobe type  Tracheostomy in place  Gastrostomy in place  Hx of appendectomy        Medication list         MEDICATIONS  (STANDING):  ALBUTerol    0.083% 2.5 milliGRAM(s) Nebulizer every 6 hours  artificial  tears Solution 1 Drop(s) Both EYES three times a day  dextrose 5% + sodium chloride 0.45% with potassium chloride 20 mEq/L 1000 milliLiter(s) (100 mL/Hr) IV Continuous <Continuous>  dextrose 5%. 1000 milliLiter(s) (50 mL/Hr) IV Continuous <Continuous>  dextrose 50% Injectable 12.5 Gram(s) IV Push once  dextrose 50% Injectable 25 Gram(s) IV Push once  dextrose 50% Injectable 25 Gram(s) IV Push once  fentaNYL   Patch  12 MICROgram(s)/Hr 1 Patch Transdermal every 72 hours  insulin glargine Injectable (LANTUS) 20 Unit(s) SubCutaneous at bedtime  insulin lispro (HumaLOG) corrective regimen sliding scale   SubCutaneous every 6 hours  pantoprazole  Injectable 40 milliGRAM(s) IV Push two times a day  polyethylene glycol 3350 17 Gram(s) Oral daily  simethicone drops 60 milliGRAM(s) Oral four times a day  sodium chloride 0.65% Nasal 1 Spray(s) Both Nostrils four times a day    MEDICATIONS  (PRN):  acetaminophen   Tablet .. 650 milliGRAM(s) Oral every 6 hours PRN Temp greater or equal to 38C (100.4F), Mild Pain (1 - 3)  dextrose 40% Gel 15 Gram(s) Oral once PRN Blood Glucose LESS THAN 70 milliGRAM(s)/deciliter  glucagon  Injectable 1 milliGRAM(s) IntraMuscular once PRN Glucose LESS THAN 70 milligrams/deciliter         Vitals log        ICU Vital Signs Last 24 Hrs  T(C): 36.6 (16 Jan 2019 04:05), Max: 37.3 (15 Lei 2019 16:10)  T(F): 97.9 (16 Jan 2019 04:05), Max: 99.1 (15 Lei 2019 16:10)  HR: 85 (16 Jan 2019 06:00) (69 - 111)  BP: 142/61 (16 Jan 2019 06:00) (118/47 - 148/60)  BP(mean): 83 (16 Jan 2019 06:00) (63 - 100)  ABP: --  ABP(mean): --  RR: 24 (16 Jan 2019 06:00) (14 - 28)  SpO2: 99% (16 Jan 2019 06:00) (98% - 100%)           Input and Output:  I&O's Detail    15 Lei 2019 07:01  -  16 Jan 2019 07:00  --------------------------------------------------------  IN:    Enteral Tube Flush: 100 mL    Enteral Tube Flush: 600 mL    Glucerna: 600 mL    ns in tub fed  [id number]: 600 mL  Total IN: 1900 mL    OUT:  Total OUT: 0 mL    Total NET: 1900 mL          Lab Data                        11.5   5.98  )-----------( 201      ( 16 Jan 2019 06:36 )             35.6     01-16    142  |  106  |  17  ----------------------------<  123<H>  3.4<L>   |  28  |  0.85    Ca    8.6      16 Jan 2019 06:36              Review of Systems	      Objective     Physical Examination    heart s1s2  lung dec BS    Pertinent Lab findings & Imaging      Annalise:  NO   Adequate UO     I&O's Detail    15 Lei 2019 07:01  -  16 Jan 2019 07:00  --------------------------------------------------------  IN:    Enteral Tube Flush: 100 mL    Enteral Tube Flush: 600 mL    Glucerna: 600 mL    ns in tub fed  [id number]: 600 mL  Total IN: 1900 mL    OUT:  Total OUT: 0 mL    Total NET: 1900 mL               Discussed with:     Cultures:	        Radiology

## 2019-01-16 NOTE — PROGRESS NOTE ADULT - SUBJECTIVE AND OBJECTIVE BOX
PULMONARY/CRITICAL CARE      INTERVAL HPI/OVERNIGHT EVENTS:    73y FemaleHPI: No sob, no fever.  Opens eyes.   73F advanced dementia, stroke, functional quadriplegia, trach collar, dysphagia with peg, chronic constipation sent from SNF with trach dislodged.  As per family she was fine last night.  This morning trach came out and they were unable to put back in so they sent her to the ED.    Ed attending replaced trach.  However, was only able to place a size 4.   Some bleeding noted after trach placement that seems to have stopped.  In the ED she was found to be febrile, leukocytosis, elevated lactate, dehydration.     Patient is non-verbal.  Grunts in response to pain.     at bedside.  Does not have further family history.  Reports their children are alive and healthy. (11 Jan 2019 12:49)        PAST MEDICAL & SURGICAL HISTORY:  Respiratory failure  Constipation  GERD (gastroesophageal reflux disease)  Hypertension  Respiratory failure  Diabetes mellitus  Aphasic stroke  Dementia of frontal lobe type  Tracheostomy in place  Gastrostomy in place  Hx of appendectomy        ICU Vital Signs Last 24 Hrs  T(C): 36.6 (16 Jan 2019 04:05), Max: 37.3 (15 Lei 2019 16:10)  T(F): 97.9 (16 Jan 2019 04:05), Max: 99.1 (15 Lei 2019 16:10)  HR: 71 (16 Jan 2019 08:00) (69 - 111)  BP: 126/56 (16 Jan 2019 08:00) (118/47 - 148/60)  BP(mean): 77 (16 Jan 2019 08:00) (63 - 100)  ABP: --  ABP(mean): --  RR: 15 (16 Jan 2019 08:00) (14 - 28)  SpO2: 100% (16 Jan 2019 08:00) (98% - 100%)    Qtts:     I&O's Summary    15 Lei 2019 07:01  -  16 Jan 2019 07:00  --------------------------------------------------------  IN: 1900 mL / OUT: 0 mL / NET: 1900 mL              LABS:                        11.5   5.98  )-----------( 201      ( 16 Jan 2019 06:36 )             35.6     01-16    142  |  106  |  17  ----------------------------<  123<H>REVIEW OF SYSTEMS:    CONSTITUTIONAL: No fever, weight loss, or fatigue  RESPIRATORY: No cough, wheezing, chills or hemoptysis; No shortness of breath  Has trach  CARDIOVASCULAR: No chest pain, palpitations, dizziness, or leg swelling  GASTROINTESTINAL: No abdominal or epigastric pain. No nausea, vomiting, or hematemesis; No diarrhea or constipation. No melena or hematochezia. Peg  NEURO: baseline very poor mental status      PHYSICAL EXAM:    GENERAL:thin female, opening eyes but unresponsive. No sob.  HEAD:  Atraumatic, Normocephalic  EYES: EOMI, PERRLA, conjunctiva and sclera clear eyes deviated to right  ENMT: No tonsillar erythema, exudates, or enlargement; Moist mucous membranes, Good dentition, No lesions  NECK: Supple, No JVD, Normal thyroid  NERVOUS SYSTEM: opens eyes, otherwise contracted and unresponsive  CHEST/LUNG: few bilat rhonchi, no wheezing, or rubs Trach intact on trach collar.  HEART: Regular rate and rhythm; No murmurs, rubs, or gallops  ABDOMEN: Soft, Nontender, Nondistended; Bowel sounds present  EXTREMITIES:  2+ Peripheral Pulses, No clubbing, cyanosis, or edema Contracted  LYMPH: No lymphadenopathy noted  SKIN: No rashes or lesions  3.4<L>   |  28  |  0.85    Ca    8.6      16 Jan 2019 06:36  Mg     2.3     01-16            vanco through     RADIOLOGY & ADDITIONAL STUDIES:      CRITICAL CARE TIME SPENT: PULMONARY/CRITICAL CARE  CLEARANCE      INTERVAL HPI/OVERNIGHT EVENTS:    73y FemaleHPI: No sob, no fever.  Opens eyes.   73F advanced dementia, stroke, functional quadriplegia, trach collar, dysphagia with peg, chronic constipation sent from SNF with trach dislodged.  As per family she was fine last night.  This morning trach came out and they were unable to put back in so they sent her to the ED.    Ed attending replaced trach.  However, was only able to place a size 4.   Some bleeding noted after trach placement that seems to have stopped.  In the ED she was found to be febrile, leukocytosis, elevated lactate, dehydration.     Patient is non-verbal.  Grunts in response to pain.     at bedside.  Does not have further family history.  Reports their children are alive and healthy. (11 Jan 2019 12:49)        PAST MEDICAL & SURGICAL HISTORY:  Respiratory failure  Constipation  GERD (gastroesophageal reflux disease)  Hypertension  Respiratory failure  Diabetes mellitus  Aphasic stroke  Dementia of frontal lobe type  Tracheostomy in place  Gastrostomy in place  Hx of appendectomy        ICU Vital Signs Last 24 Hrs  T(C): 36.6 (16 Jan 2019 04:05), Max: 37.3 (15 Lei 2019 16:10)  T(F): 97.9 (16 Jan 2019 04:05), Max: 99.1 (15 Lei 2019 16:10)  HR: 71 (16 Jan 2019 08:00) (69 - 111)  BP: 126/56 (16 Jan 2019 08:00) (118/47 - 148/60)  BP(mean): 77 (16 Jan 2019 08:00) (63 - 100)  ABP: --  ABP(mean): --  RR: 15 (16 Jan 2019 08:00) (14 - 28)  SpO2: 100% (16 Jan 2019 08:00) (98% - 100%)    Qtts:     I&O's Summary    15 Lei 2019 07:01  -  16 Jan 2019 07:00  --------------------------------------------------------  IN: 1900 mL / OUT: 0 mL / NET: 1900 mL              LABS:                        11.5   5.98  )-----------( 201      ( 16 Jan 2019 06:36 )             35.6     01-16    142  |  106  |  17  ----------------------------<  123<H>REVIEW OF SYSTEMS:    CONSTITUTIONAL: No fever, weight loss, or fatigue  RESPIRATORY: No cough, wheezing, chills or hemoptysis; No shortness of breath  Has trach  CARDIOVASCULAR: No chest pain, palpitations, dizziness, or leg swelling  GASTROINTESTINAL: No abdominal or epigastric pain. No nausea, vomiting, or hematemesis; No diarrhea or constipation. No melena or hematochezia. Peg  NEURO: baseline very poor mental status      PHYSICAL EXAM:    GENERAL:thin female, opening eyes but unresponsive. No sob.  HEAD:  Atraumatic, Normocephalic  EYES: EOMI, PERRLA, conjunctiva and sclera clear eyes deviated to right  ENMT: No tonsillar erythema, exudates, or enlargement; Moist mucous membranes, Good dentition, No lesions  NECK: Supple, No JVD, Normal thyroid  NERVOUS SYSTEM: opens eyes, otherwise contracted and unresponsive  CHEST/LUNG: few bilat rhonchi, no wheezing, or rubs Trach intact on trach collar.  HEART: Regular rate and rhythm; No murmurs, rubs, or gallops  ABDOMEN: Soft, Nontender, Nondistended; Bowel sounds present  EXTREMITIES:  2+ Peripheral Pulses, No clubbing, cyanosis, or edema Contracted  LYMPH: No lymphadenopathy noted  SKIN: No rashes or lesions  3.4<L>   |  28  |  0.85    Ca    8.6      16 Jan 2019 06:36  Mg     2.3     01-16            vanco through     RADIOLOGY & ADDITIONAL STUDIES:      CRITICAL CARE TIME SPENT:

## 2019-01-16 NOTE — DISCHARGE NOTE ADULT - PATIENT PORTAL LINK FT
You can access the [facility] Portal, offered by [facility], by registering with the following website: [email or web address]

## 2019-01-16 NOTE — DISCHARGE NOTE ADULT - CARE PROVIDERS DIRECT ADDRESSES
,DirectAddress_Unknown,DirectAddress_Unknown,[email or web address],DirectAddress_Unknown

## 2019-01-16 NOTE — DISCHARGE NOTE ADULT - SECONDARY DIAGNOSIS.
Tracheostomy in place Gastroesophageal reflux disease, esophagitis presence not specified Pre-operative examination

## 2019-01-16 NOTE — DISCHARGE NOTE ADULT - MEDICATION SUMMARY - MEDICATIONS TO TAKE
I will START or STAY ON the medications listed below when I get home from the hospital:    Tylenol 325 mg oral tablet  -- 2 tab(s) by gastrostomy tube every 6 hours  -- Indication: For Pain    fentaNYL 12 mcg/hr transdermal film, extended release  -- 1 patch by transdermal patch every 72 hours  -- Indication: For Pain    insulin glargine  -- 20 unit(s) subcutaneous once a day (at bedtime)  -- Indication: For Diabetes mellitus    albuterol 2.5 mg/3 mL (0.083%) inhalation solution  -- 1 unit(s) inhaled every 6 hours  -- Indication: For Pneumonitis due to inhalation of food or vomitus    MiraLax oral powder for reconstitution  -- 17 gram(s) by gastrostomy tube once a day  -- Indication: For Constipation, unspecified constipation type    Carafate 1 g/10 mL oral suspension  -- 10 milliliter(s) by gastrostomy tube 2 times a day  -- Indication: For Gastroesophageal reflux disease, esophagitis presence not specified    simethicone 40 mg/0.6 mL oral liquid  -- 0.9 milliliter(s) by mouth 4 times a day  -- Indication: For Constipation, unspecified constipation type    sodium chloride 0.65% nasal spray  -- 1  into nose 4 times a day  -- Indication: For nasal dryness    ocular lubricant ophthalmic solution  -- 1 drop(s) to each affected eye 3 times a day  -- Indication: For Dry eyes    Protonix  -- 40 milligram(s) by mouth 2 times a day  -- Indication: For Gerd

## 2019-01-16 NOTE — PROGRESS NOTE ADULT - ASSESSMENT
73F advanced dementia, stroke, functional quadriplegia, trach collar, dysphagia with peg, chronic constipation sent from SNF with trach dislodged which was replaced by ED doc.  Found to have:  Fever and leukocytosis - probably due to  pneumonitis as no clear infectious source identified   elevated lactate - likely hypoxia -resolved     Problem/Plan - 1:  ·  Problem: Acute respiratory failure with hypoxia.  Plan: trach fixed, now tolerating trach collar   ENT eval appreciated trach site revision scheduled for 1/23/19 at [place]; will dc to [facility] and patient can have surgery as outpatient.  Patient has many chronic medical problems but no contraindications to proceed to OR for planned procedure.  GI and pulm to also provide pre-op evaluation.      Problem/Plan - 2:  ·  Problem: Aspiration pneumonia of right lung, unspecified aspiration pneumonia type, unspecified part of lung.  Plan:  As per Id monitor off abx.        Problem/Plan - 3:  ·  Problem: Type 2 diabetes mellitus with other specified complication, with long-term current use of insulin.  Plan: continue basal insulin.   FS checks q6h with lispro coverage.      Problem/Plan - 4:  ·  Problem: Gastroesophageal reflux disease, esophagitis presence not specified.  Plan: conitnue pepcid.  No further black fluid from PEG. continue PPI and carafate.      Problem/Plan - 5:  ·  Problem: Constipation, unspecified constipation type.  Plan: continue bowel regimen     Problem/Plan - 6:  DVT proph: lovenox.        Problem/Plan - 7:  ?if episodes of rigidity seizure or myoclonic jerks?  Dr Shin to eval patient.       Problem/Plan - 8:  ·  Problem: Advanced directives, counseling/discussion.   wishes for her to remain full code and wants all medical care possible.

## 2019-01-16 NOTE — PROGRESS NOTE ADULT - PROBLEM SELECTOR PLAN 1
monitor cbc, transfuse prn,  carafate 1 gram bid   PPI bid  hold A/C and NSAIDs  hgb stable  EGD once optimized and bleeding persists monitor cbc, transfuse prn,  carafate 1 gram bid   PPI bid  hold A/C and NSAIDs  hgb stable  EGD once optimized and bleeding persists    pt is cleared from gi standpoint for planeed procedure

## 2019-01-16 NOTE — PROGRESS NOTE ADULT - SUBJECTIVE AND OBJECTIVE BOX
INTERVAL HPI/OVERNIGHT EVENTS:  No new overnight event.  No N/V/D.  Tolerating diet peg tube  Allergies    codeine (Hives)    Intolerances    General:  No wt loss, fevers, chills, night sweats, fatigue,   Eyes:  Good vision, no reported pain  ENT:  No sore throat, pain, runny nose, dysphagia  CV:  No pain, palpitations, hypo/hypertension  Resp:  No dyspnea, cough, tachypnea, wheezing  GI:  No pain, No nausea, No vomiting, No diarrhea, No constipation, No weight loss, No fever, No pruritis, No rectal bleeding, No tarry stools, No dysphagia,  :  No pain, bleeding, incontinence, nocturia  Muscle:  No pain, weakness  Neuro:  No weakness, tingling, memory problems  Psych:  No fatigue, insomnia, mood problems, depression  Endocrine:  No polyuria, polydipsia, cold/heat intolerance  Heme:  No petechiae, ecchymosis, easy bruisability  Skin:  No rash, tattoos, scars, edema      PHYSICAL EXAM:   Vital Signs:  Vital Signs Last 24 Hrs  T(C): 36.6 (16 Jan 2019 04:05), Max: 37.3 (15 Lei 2019 16:10)  T(F): 97.9 (16 Jan 2019 04:05), Max: 99.1 (15 Lei 2019 16:10)  HR: 69 (16 Jan 2019 10:00) (69 - 94)  BP: 116/56 (16 Jan 2019 10:00) (116/56 - 147/82)  BP(mean): 73 (16 Jan 2019 10:00) (63 - 100)  RR: 19 (16 Jan 2019 10:00) (14 - 24)  SpO2: 100% (16 Jan 2019 10:00) (98% - 100%)  Daily     Daily I&O's Summary    15 Lei 2019 07:01  -  16 Jan 2019 07:00  --------------------------------------------------------  IN: 1900 mL / OUT: 0 mL / NET: 1900 mL    16 Jan 2019 07:01  -  16 Jan 2019 12:28  --------------------------------------------------------  IN: 150 mL / OUT: 0 mL / NET: 150 mL        GENERAL:  Appears stated age, well-groomed, well-nourished, no distress  HEENT:  NC/AT,  conjunctivae clear and pink, no thyromegaly, nodules, adenopathy, no JVD, sclera -anicteric  CHEST:  Full & symmetric excursion, no increased effort, breath sounds clear  HEART:  Regular rhythm, S1, S2, no murmur/rub/S3/S4, no abdominal bruit, no edema  ABDOMEN:  Soft, non-tender, non-distended, normoactive bowel sounds,  no masses ,no hepato-splenomegaly, no signs of chronic liver disease  EXTEREMITIES:  no cyanosis,clubbing or edema  SKIN:  No rash/erythema/ecchymoses/petechiae/wounds/abscess/warm/dry  NEURO:  Alert, oriented, no asterixis, no tremor, no encephalopathy      LABS:                        11.5   5.98  )-----------( 201      ( 16 Jan 2019 06:36 )             35.6     01-16    142  |  106  |  17  ----------------------------<  123<H>  3.4<L>   |  28  |  0.85    Ca    8.6      16 Jan 2019 06:36  Mg     2.3     01-16          amylase   lipase  RADIOLOGY & ADDITIONAL TESTS:

## 2019-01-16 NOTE — PROGRESS NOTE ADULT - NSHPATTENDINGPLANDISCUSS_GEN_ALL_CORE
team
PA, RT, Dr. oshea nursing in detail
PA, RT, and nursing in detail
PA, RT, family and nursing in detail

## 2019-01-16 NOTE — PROGRESS NOTE ADULT - ATTENDING COMMENTS
Advanced care planning was discussed with patient and family.  Advanced care planning forms were reviewed and discussed.  Risks, benefits and alternatives of gastroenterologic procedures were discussed in detail and all questions were answered.    30 minutes spent.
Pt critically ill at high risk of death and deterioration.  Requires ICU care  Reviewed all xrays, notes and labs  Prognosis poor
Pt critically ill at high risk of death and deterioration.  Requires ICU care  Reviewed all xrays, notes and labs  Prognosis poor
Reviewed all xrays, notes and labs  Prognosis poor

## 2019-01-16 NOTE — PROGRESS NOTE ADULT - SUBJECTIVE AND OBJECTIVE BOX
Neurology follow up note    BREA [id number]      Interval History:    seen with spouse resting in bed     MEDICATIONS    acetaminophen   Tablet .. 650 milliGRAM(s) Oral every 6 hours PRN  ALBUTerol    0.083% 2.5 milliGRAM(s) Nebulizer every 6 hours  artificial  tears Solution 1 Drop(s) Both EYES three times a day  dextrose 40% Gel 15 Gram(s) Oral once PRN  dextrose 5% + sodium chloride 0.45% with potassium chloride 20 mEq/L 1000 milliLiter(s) IV Continuous <Continuous>  dextrose 5%. 1000 milliLiter(s) IV Continuous <Continuous>  dextrose 50% Injectable 12.5 Gram(s) IV Push once  dextrose 50% Injectable 25 Gram(s) IV Push once  dextrose 50% Injectable 25 Gram(s) IV Push once  fentaNYL   Patch  12 MICROgram(s)/Hr 1 Patch Transdermal every 72 hours  glucagon  Injectable 1 milliGRAM(s) IntraMuscular once PRN  insulin glargine Injectable (LANTUS) 20 Unit(s) SubCutaneous at bedtime  insulin lispro (HumaLOG) corrective regimen sliding scale   SubCutaneous every 6 hours  pantoprazole  Injectable 40 milliGRAM(s) IV Push two times a day  polyethylene glycol 3350 17 Gram(s) Oral daily  simethicone drops 60 milliGRAM(s) Oral four times a day  sodium chloride 0.65% Nasal 1 Spray(s) Both Nostrils four times a day      Allergies    codeine (Hives)    Intolerances            Vital Signs Last 24 Hrs  T(C): 36.6 (16 Jan 2019 04:05), Max: 37.3 (15 Lei 2019 16:10)  T(F): 97.9 (16 Jan 2019 04:05), Max: 99.1 (15 Lei 2019 16:10)  HR: 77 (16 Jan 2019 13:17) (69 - 94)  BP: 130/63 (16 Jan 2019 12:00) (116/56 - 147/82)  BP(mean): 83 (16 Jan 2019 12:00) (63 - 100)  RR: 21 (16 Jan 2019 12:00) (14 - 24)  SpO2: 99% (16 Jan 2019 13:17) (98% - 100%)      REVIEW OF SYSTEMS: Non Verbal          PHYSICAL EXAMINATION:     HEENT:  Head:  Normocephalic and atraumatic.    Eyes:  No scleral icterus.    Ears:  Hearing unable to evaluate secondary to the patient being nonverbal.    NECK:  Increased tone, has a tracheostomy in place.    RESPIRATORY:  Good air entry bilaterally.    CARDIOVASCULAR:  S1 and S2 are heard.    ABDOMEN:  Soft and nontender.    EXTREMITIES:  No clubbing or cyanosis were noted.      NEUROLOGIC:  The patient is resting in bed.    I opened eyes, no gaze preference.  No blink to bilateral visual threats.    Pupils bilaterally were 3 mm, reactive to 2 mm.    The patient's arms were in a flexed position with hands contracted.    Bilateral lower extremities were in a straight position.    The patient has significantly increased tone bilateral upper extremities.  T  he patient did have an episode where she opened her eyes and her body started to become flexed, stiffening.  As per the spouse, this is old.                    LABS:  CBC Full  -  ( 16 Jan 2019 06:36 )  WBC Count : 5.98 K/uL  Hemoglobin : 11.5 g/dL  Hematocrit : 35.6 %  Platelet Count - Automated : 201 K/uL  Mean Cell Volume : 85.2 fl  Mean Cell Hemoglobin : 27.5 pg  Mean Cell Hemoglobin Concentration : 32.3 gm/dL  Auto Neutrophil # : 3.44 K/uL  Auto Lymphocyte # : 1.75 K/uL  Auto Monocyte # : 0.58 K/uL  Auto Eosinophil # : 0.17 K/uL  Auto Basophil # : 0.02 K/uL  Auto Neutrophil % : 57.6 %  Auto Lymphocyte % : 29.3 %  Auto Monocyte % : 9.7 %  Auto Eosinophil % : 2.8 %  Auto Basophil % : 0.3 %      01-16    142  |  106  |  17  ----------------------------<  123<H>  3.4<L>   |  28  |  0.85    Ca    8.6      16 Jan 2019 06:36  Mg     2.3     01-16      Hemoglobin A1C:       Vitamin B12         RADIOLOGY      ANALYSIS AND PLAN:  This is a 73-year-old with episodes of abnormal movements.  1.	For abnormal movements, episodes of stiffening, questionable these could be secondary to any type of underlying respiratory, cardiac, or blood pressures issues, questionable this could be secondary to the patient's underlying cognitive status with a neurodegenerative disorder with degeneration, the patient's brain parenchyma, causing her to have these abnormal movements.  As per my conversation with the spouse, this has been going on for over five years, she has had over 200 of these events.  It appears from my conversation that she possibly did have EEG prolonged monitoring that recorded these events, but no seizure activity.  2.	I would recommend to monitor saturation.  3.	Monitor vitals.  4.	I would not be starting the patient on any antiepileptic medications, as per my conversation with the spouse.  5.	Continue to monitor vitals.  6.	Monitor electrolytes.  7.	spoke to spouse at bedside  8.	  Neurologic standpoint only cleared for discharge planning     Thank you for the courtesy of this consultation.      Greater than 45 minutes spent in direct patient care reviewing  the notes, lab data/ imaging , discussion with multidisciplinary team.

## 2019-01-17 LAB
CULTURE RESULTS: SIGNIFICANT CHANGE UP
SPECIMEN SOURCE: SIGNIFICANT CHANGE UP

## 2019-01-18 LAB
OB PNL GAST: POSITIVE
OCCULT BLOOD, GASTRIC FLUID PH: 2 — SIGNIFICANT CHANGE UP (ref 1–7)

## 2019-01-20 LAB
CULTURE RESULTS: SIGNIFICANT CHANGE UP
CULTURE RESULTS: SIGNIFICANT CHANGE UP
SPECIMEN SOURCE: SIGNIFICANT CHANGE UP
SPECIMEN SOURCE: SIGNIFICANT CHANGE UP

## 2019-01-23 ENCOUNTER — TRANSCRIPTION ENCOUNTER (OUTPATIENT)
Age: 74
End: 2019-01-23

## 2019-01-24 ENCOUNTER — OUTPATIENT (OUTPATIENT)
Dept: OUTPATIENT SERVICES | Facility: HOSPITAL | Age: 74
LOS: 1 days | End: 2019-01-24
Payer: MEDICARE

## 2019-01-24 ENCOUNTER — RESULT REVIEW (OUTPATIENT)
Age: 74
End: 2019-01-24

## 2019-01-24 VITALS
SYSTOLIC BLOOD PRESSURE: 159 MMHG | OXYGEN SATURATION: 98 % | TEMPERATURE: 99 F | HEART RATE: 115 BPM | DIASTOLIC BLOOD PRESSURE: 77 MMHG | RESPIRATION RATE: 22 BRPM

## 2019-01-24 VITALS
RESPIRATION RATE: 16 BRPM | DIASTOLIC BLOOD PRESSURE: 56 MMHG | OXYGEN SATURATION: 97 % | SYSTOLIC BLOOD PRESSURE: 111 MMHG | TEMPERATURE: 98 F | HEART RATE: 71 BPM

## 2019-01-24 DIAGNOSIS — J98.8 OTHER SPECIFIED RESPIRATORY DISORDERS: ICD-10-CM

## 2019-01-24 PROCEDURE — 31600 PLANNED TRACHEOSTOMY: CPT

## 2019-01-24 PROCEDURE — L8699: CPT

## 2019-01-24 PROCEDURE — 88300 SURGICAL PATH GROSS: CPT | Mod: 26

## 2019-01-24 PROCEDURE — 82962 GLUCOSE BLOOD TEST: CPT

## 2019-01-24 PROCEDURE — 88304 TISSUE EXAM BY PATHOLOGIST: CPT | Mod: 26

## 2019-01-24 PROCEDURE — 88304 TISSUE EXAM BY PATHOLOGIST: CPT

## 2019-01-24 PROCEDURE — 88300 SURGICAL PATH GROSS: CPT

## 2019-01-24 RX ORDER — SODIUM CHLORIDE 9 MG/ML
1000 INJECTION, SOLUTION INTRAVENOUS
Qty: 0 | Refills: 0 | Status: DISCONTINUED | OUTPATIENT
Start: 2019-01-24 | End: 2019-01-24

## 2019-01-24 RX ORDER — ONDANSETRON 8 MG/1
4 TABLET, FILM COATED ORAL ONCE
Qty: 0 | Refills: 0 | Status: DISCONTINUED | OUTPATIENT
Start: 2019-01-24 | End: 2019-01-24

## 2019-01-24 RX ORDER — HYDROMORPHONE HYDROCHLORIDE 2 MG/ML
0.5 INJECTION INTRAMUSCULAR; INTRAVENOUS; SUBCUTANEOUS
Qty: 0 | Refills: 0 | Status: DISCONTINUED | OUTPATIENT
Start: 2019-01-24 | End: 2019-01-24

## 2019-01-24 RX ADMIN — SODIUM CHLORIDE 35 MILLILITER(S): 9 INJECTION, SOLUTION INTRAVENOUS at 12:11

## 2019-01-24 NOTE — ASU DISCHARGE PLAN (ADULT/PEDIATRIC). - MEDICATION SUMMARY - MEDICATIONS TO TAKE
I will START or STAY ON the medications listed below when I get home from the hospital:    Tylenol 325 mg oral tablet  -- 2 tab(s) by gastrostomy tube every 6 hours  -- Indication: For analgesic-home medication    fentaNYL 12 mcg/hr transdermal film, extended release  -- 1 patch by transdermal patch every 72 hours  -- Indication: For pain medication-home medication    insulin glargine  -- 20 unit(s) subcutaneous once a day (at bedtime)  -- Indication: For diabetes-home med    albuterol 2.5 mg/3 mL (0.083%) inhalation solution  -- 1 unit(s) inhaled every 6 hours  -- Indication: For asthma-home med    MiraLax oral powder for reconstitution  -- 17 gram(s) by gastrostomy tube once a day  -- Indication: For laxative- home med    Carafate 1 g/10 mL oral suspension  -- 10 milliliter(s) by gastrostomy tube 2 times a day  -- Indication: For GERD-home med    simethicone 40 mg/0.6 mL oral liquid  -- 0.9 milliliter(s) by mouth 4 times a day  -- Indication: For anti gas-home med    sodium chloride 0.65% nasal spray  -- 1  into nose 4 times a day  -- Indication: For nasal moisturizer-home med    ocular lubricant ophthalmic solution  -- 1 drop(s) to each affected eye 3 times a day  -- Indication: For eye drops- home med    Protonix  -- 40 milligram(s) by mouth 2 times a day  -- Indication: For GERD-home med

## 2019-01-24 NOTE — BRIEF OPERATIVE NOTE - POST-OP DX
Tracheostomy complication, unspecified complication type  01/24/2019  stenosis and granulation tissue  Active  Sebastian Santos

## 2019-01-24 NOTE — ASU PATIENT PROFILE, ADULT - TEACHING/LEARNING FACTORS IMPACT ABILITY TO LEARN
none/demented
Airway patent, Nasal mucosa clear. Mouth with normal mucosa. Throat has no vesicles, no oropharyngeal exudates and uvula is midline.

## 2019-01-24 NOTE — ASU PREOP CHECKLIST - TO WHOM
Subjective:   77 y.o. female for f/u     We have not seen her in about 1.5 years but she's doing ok    She came off the metformin for reasons unclear, wants to restart. Denies polyuria, polydipsia, nocturia, vision change. Not checking sugars at this time. Weight is back up as she's been stress eating and no exercise    Vitals 9/28/2017 3/25/2016 1/4/2016 11/10/2015 7/1/2015   Weight 206 lb 6.4 oz 193 lb 190 lb 192 lb 190 lb     She also came off the lexapro but wants to change back to zoloft    She's not exercising much, much mainly due to motivational issues, no reports of cardiovascular complaints. She's had some wheezing but minimal coughing/sob.  Still smoking but interested in stopping at this time    No GI or  issues    No sx of hypo or hyperthyroid, she has not seen endocrine in >3 years    She has a rash in her BLE R>L calf areas     Past Medical History:   Diagnosis Date    Allergic rhinitis     Depression     Diverticulosis 10/13    Dr Tiffany NORWOOD (degenerative joint disease)     Dyslipidemia     calculated 10 year risk score was 5.1% (8/13); 4.5% (7/15); 3.5% (11/15)    FHx: heart disease     GERD (gastroesophageal reflux disease)     Multinodular goiter neg biopsy 2007 Dr. Junior Guardado Obesity     peak weight 200 lbs, bmi 33.3 from 8/13; intol belviq,qsymia, contrave, saxenda not covered    Prediabetes 2011    2 hr     Rosacea 2007    Dr. Reva Garcia Tobacco dependence syndrome      Past Surgical History:   Procedure Laterality Date    BIOPSY OF BREAST, INCISIONAL      lt (25 years ago)   [address]    left breast lumpectomy for benign mass    HX COLONOSCOPY      10/13 diverticulosis Dr Jermaine Stern  2007    Dr. Margaret Ibarra negative   [address]  12/12    community screen neg AAA, negative carotids, GABE 1.09/1.19     Social History     Social History    Marital status:      Spouse name: N/A  Number of children: 0    Years of education: N/A     Occupational History          Social History Main Topics    Smoking status: Current Some Day Smoker     Packs/day: 0.25     Years: 40.00    Smokeless tobacco: Never Used    Alcohol use 3.5 oz/week     7 Glasses of wine per week    Drug use: No    Sexual activity: Not on file     Other Topics Concern    Not on file     Social History Narrative     Current Outpatient Prescriptions   Medication Sig    clindamycin (CLINDAGEL) 1 % topical gel Apply  to affected area two (2) times a day. use thin film on affected area    albuterol (PROVENTIL HFA, VENTOLIN HFA, PROAIR HFA) 90 mcg/actuation inhaler Take 2 Puffs by inhalation every six (6) hours as needed for Wheezing.  budesonide-formoterol (SYMBICORT) 160-4.5 mcg/actuation HFAA Take 2 Puffs by inhalation two (2) times a day.  clotrimazole-betamethasone (LOTRISONE) topical cream Apply this layer to affected area twice daily, no more than 7 days    metFORMIN ER (GLUCOPHAGE XR) 500 mg tablet Take 1 Tab by mouth daily (with dinner).  sertraline (ZOLOFT) 100 mg tablet Take 1 Tab by mouth daily.  multivitamins-minerals-lutein (CENTRUM SILVER) Tab Take  by mouth daily. No current facility-administered medications for this visit.       Allergies   Allergen Reactions    Belviq [Lorcaserin] Other (comments)     foggy and unfocused    Contrave [Naltrexone-Bupropion] Other (comments)     Insomnia      Effexor [Venlafaxine] Other (comments)     Agitation      Ibuprofen Other (comments)     Anxiety      Qsymia [Phentermine-Topiramate] Other (comments)     Memory prob, dry mouth, paresthesia    Zithromax [Azithromycin] Other (comments)     abd cramping       LAST MEDICARE WELLNESS EXAM: never as not medicare b    REVIEW OF SYSTEMS: gyn >5 yrs ago in [place], mammo 1/5, DEXA not yet done, colo 10/13 Dr Eloina Jameson no vision change or eye pain  Oral  no mouth pain, tongue or tooth problems  Ears  no hearing loss, ear pain, fullness, no swallowing problems  Cardiac  no CP, PND, orthopnea, edema, palpitations or syncope  Chest  no breast masses  Resp  no wheezing, chronic coughing, dyspnea  GI  no heartburn, nausea, vomiting, change in bowel habits, bleeding, hemorrhoids  Urinary  no dysuria, hematuria, flank pain, urgency, frequency  Neuro  no focal weakness, numbness, paresthesias, incoordination, ataxia, involuntary movements  Endo - no polyuria, polydipsia, nocturia, hot flashes         Visit Vitals    /84 (BP 1 Location: Right arm, BP Patient Position: Sitting)    Pulse 91    Temp 99.1 °F (37.3 °C) (Oral)    Resp 14    Ht 5' 4.75\" (1.645 m)    Wt 206 lb 6.4 oz (93.6 kg)    SpO2 97%    BMI 34.61 kg/m2     Affect is appropriate. Mood stable  No apparent distress  HEENT --Anicteric sclerae. Goiter noted, JVD, or bruits. Lungs --Clear to auscultation, normal percussion. Heart --Regular rate and rhythm, no murmurs, rubs, gallops, or clicks. Chest wall --Nontender to palpation. PMI normal.  Abdomen -- Soft and nontender, no hepatosplenomegaly or masses. Extremities -- Without cyanosis, clubbing, edema. 2+ pulses equally and bilaterally.     LABS   From 9/06 showed   gluc 88,   cr 0.60,              alt 31,                                     chol 168, tg 96,   hdl 55, ldl-c 94,   tsh 0.90, wbc 6.1, hb 13.6, plt 330  From 11/11 showed         hba1c 6.1, ldl-p 1677, chol 193, tg 153, hdl 48, ldl-c 114, tsh 1.35, 2hr   From 8/12 showed   gluc 112, cr 0.66, gfr 109, alt 31, hba1c 6.1, ldl-p 1514, chol 174, tg 175, hdl 46, ldl-c 93  From 2/13 showed   gluc 102, cr 0.60, gfr 98,   alt 15,       chol 162, tg 118, hdl 48, ldl-c 90,   tsh 1.21   From 8/13 showed         hba1c 6.1,     chol 190, tg 120, hdl 45, ldl-c 121  From 2/14 showed   gluc 108, cr 0.52, gfr 102, alt 10,                  tsh 1.09, wbc 7.4, hb 13.8, plt 332  From 8/14 showed         hba1c 6.4, chol 183, tg 117, hdl 52, ldl-c 108, ua neg  From 12/14 showed gluc 102, cr 0.66, gfr>60,     hba1c 6.0,                 tsh 0.90, wbc 8.2, hb 14.6, plt 410  From 7/15 showed   gluc 111, cr 0.84, gfr>60,     hba1c 6.2,      chol 179, tg 117, hdl 51, ldl-c 103  From 10/15 showed         hba1c 6.1,      chol 167, tg 99,   hdl 50, ldl-c 97  From 3/16 showed   gluc 98,   cr 0.52, gfr>60, alt 34,  hba1c 6.1,                 tsh 0.74, wbc 7.5, hb 14.4, plt 379    Results for orders placed or performed during the hospital encounter of 81/36/91   METABOLIC PANEL, BASIC   Result Value Ref Range    Sodium 140 136 - 145 mmol/L    Potassium 4.2 3.5 - 5.5 mmol/L    Chloride 104 100 - 108 mmol/L    CO2 28 21 - 32 mmol/L    Anion gap 8 3.0 - 18 mmol/L    Glucose 109 (H) 74 - 99 mg/dL    BUN 9 7.0 - 18 MG/DL    Creatinine 0.54 (L) 0.6 - 1.3 MG/DL    BUN/Creatinine ratio 17 12 - 20      GFR est AA >60 >60 ml/min/1.73m2    GFR est non-AA >60 >60 ml/min/1.73m2    Calcium 9.1 8.5 - 10.1 MG/DL   HEMOGLOBIN A1C WITH EAG   Result Value Ref Range    Hemoglobin A1c 6.2 (H) 4.2 - 5.6 %    Est. average glucose 131 mg/dL     Patient Active Problem List   Diagnosis Code    Depression F32.9    Multinodular goiter neg biopsy 2007 Dr. Eileen Shields E04.2    Prediabetes R73.03    Dyslipidemia E78.5    Arthritis, degenerative M19.90    Diverticulosis  [facility] - EAT 10/13 K57.30    Tobacco dependence syndrome F17.200    Obesity (BMI 30.0-34. 9) E66.9    Advance directive discussed with patient Z71.89     Assessment/Plan:   1. Depression. Change back to zoloft per her request  2. Obesity. Dietary and lifestyle measures, will send to dietician which she is open to now  3. Prediabetes. Lifestyle and dietary measures, low dose metformin to be refilled  4. Dyslipidemia. Continue diet and inc exercise as tolerated  5. MNG.  F/U Dr. Abigail Man as needed  6. Smoking cessation discussed at length. Medication and non pharmacologic options explored.   Success rates and side effect profiles of chantix and wellbutrin discussed, declined meds again,  Total time 3 min  7. Screenings. DEXA and mammo ordered  8. Immunizations. Flu HD given  9. Wheezing. Declined cxr, pfts. Refilled the symbicort and alb, smoking cessation as above  10. Rash. Lotrisone        RTC 3/18    Above conditions discussed at length and patient vocalized understanding.   All questions answered to patient satifaction allocca

## 2019-01-24 NOTE — BRIEF OPERATIVE NOTE - PROCEDURE
<<-----Click on this checkbox to enter Procedure Tracheostomy, revision  01/24/2019  release of scar and removal of granulation tissue  Active  ROB

## 2019-01-24 NOTE — BRIEF OPERATIVE NOTE - PRE-OP DX
Tracheostomy complication, unspecified complication type  01/24/2019  stenosis of stoma and granulation  Active  Sebastian Santos

## 2019-01-28 LAB — SURGICAL PATHOLOGY STUDY: SIGNIFICANT CHANGE UP

## 2020-05-04 ENCOUNTER — INPATIENT (INPATIENT)
Facility: HOSPITAL | Age: 75
LOS: 13 days | Discharge: NURSING, REHAB OR LONG-TERM CARE | DRG: 207 | End: 2020-05-18
Attending: HOSPITALIST | Admitting: INTERNAL MEDICINE
Payer: MEDICARE

## 2020-05-04 VITALS
DIASTOLIC BLOOD PRESSURE: 62 MMHG | WEIGHT: 129.19 LBS | RESPIRATION RATE: 22 BRPM | HEIGHT: 64 IN | OXYGEN SATURATION: 73 % | SYSTOLIC BLOOD PRESSURE: 114 MMHG | TEMPERATURE: 98 F | HEART RATE: 94 BPM

## 2020-05-04 DIAGNOSIS — Z93.0 TRACHEOSTOMY STATUS: Chronic | ICD-10-CM

## 2020-05-04 DIAGNOSIS — Z93.1 GASTROSTOMY STATUS: Chronic | ICD-10-CM

## 2020-05-04 DIAGNOSIS — R09.02 HYPOXEMIA: ICD-10-CM

## 2020-05-04 LAB
ALBUMIN SERPL ELPH-MCNC: 2.8 G/DL — LOW (ref 3.3–5.2)
ALP SERPL-CCNC: 89 U/L — SIGNIFICANT CHANGE UP (ref 40–120)
ALT FLD-CCNC: 26 U/L — SIGNIFICANT CHANGE UP
ANION GAP SERPL CALC-SCNC: 15 MMOL/L — SIGNIFICANT CHANGE UP (ref 5–17)
APPEARANCE UR: CLEAR — SIGNIFICANT CHANGE UP
APTT BLD: 39.9 SEC — HIGH (ref 27.5–36.3)
AST SERPL-CCNC: 43 U/L — HIGH
BACTERIA # UR AUTO: ABNORMAL
BASE EXCESS BLDV CALC-SCNC: 4.8 MMOL/L — HIGH (ref -2–2)
BASOPHILS # BLD AUTO: 0.01 K/UL — SIGNIFICANT CHANGE UP (ref 0–0.2)
BASOPHILS NFR BLD AUTO: 0.1 % — SIGNIFICANT CHANGE UP (ref 0–2)
BILIRUB SERPL-MCNC: 0.4 MG/DL — SIGNIFICANT CHANGE UP (ref 0.4–2)
BILIRUB UR-MCNC: NEGATIVE — SIGNIFICANT CHANGE UP
BUN SERPL-MCNC: 42 MG/DL — HIGH (ref 8–20)
CA-I SERPL-SCNC: 1 MMOL/L — LOW (ref 1.15–1.33)
CALCIUM SERPL-MCNC: 8.6 MG/DL — SIGNIFICANT CHANGE UP (ref 8.6–10.2)
CHLORIDE BLDV-SCNC: 102 MMOL/L — SIGNIFICANT CHANGE UP (ref 98–107)
CHLORIDE SERPL-SCNC: 98 MMOL/L — SIGNIFICANT CHANGE UP (ref 98–107)
CO2 SERPL-SCNC: 26 MMOL/L — SIGNIFICANT CHANGE UP (ref 22–29)
COLOR SPEC: YELLOW — SIGNIFICANT CHANGE UP
COMMENT - URINE: SIGNIFICANT CHANGE UP
CREAT SERPL-MCNC: 1.03 MG/DL — SIGNIFICANT CHANGE UP (ref 0.5–1.3)
CRP SERPL-MCNC: 36.32 MG/DL — HIGH (ref 0–0.4)
D DIMER BLD IA.RAPID-MCNC: 426 NG/ML DDU — HIGH
DIFF PNL FLD: ABNORMAL
EOSINOPHIL # BLD AUTO: 0 K/UL — SIGNIFICANT CHANGE UP (ref 0–0.5)
EOSINOPHIL NFR BLD AUTO: 0 % — SIGNIFICANT CHANGE UP (ref 0–6)
EPI CELLS # UR: SIGNIFICANT CHANGE UP
FERRITIN SERPL-MCNC: 889 NG/ML — HIGH (ref 15–150)
GAS PNL BLDV: 144 MMOL/L — SIGNIFICANT CHANGE UP (ref 135–145)
GAS PNL BLDV: SIGNIFICANT CHANGE UP
GAS PNL BLDV: SIGNIFICANT CHANGE UP
GLUCOSE BLDV-MCNC: 261 MG/DL — HIGH (ref 70–99)
GLUCOSE SERPL-MCNC: 261 MG/DL — HIGH (ref 70–99)
GLUCOSE UR QL: NEGATIVE MG/DL — SIGNIFICANT CHANGE UP
HCO3 BLDV-SCNC: 28 MMOL/L — SIGNIFICANT CHANGE UP (ref 21–29)
HCT VFR BLD CALC: 37.1 % — SIGNIFICANT CHANGE UP (ref 34.5–45)
HGB BLD-MCNC: 11.3 G/DL — LOW (ref 11.5–15.5)
IMM GRANULOCYTES NFR BLD AUTO: 1.3 % — SIGNIFICANT CHANGE UP (ref 0–1.5)
INR BLD: 1.08 RATIO — SIGNIFICANT CHANGE UP (ref 0.88–1.16)
KETONES UR-MCNC: ABNORMAL
LACTATE BLDV-MCNC: 1.9 MMOL/L — SIGNIFICANT CHANGE UP (ref 0.5–2)
LDH SERPL L TO P-CCNC: 471 U/L — HIGH (ref 98–192)
LEUKOCYTE ESTERASE UR-ACNC: ABNORMAL
LYMPHOCYTES # BLD AUTO: 1.21 K/UL — SIGNIFICANT CHANGE UP (ref 1–3.3)
LYMPHOCYTES # BLD AUTO: 10.9 % — LOW (ref 13–44)
MCHC RBC-ENTMCNC: 27.6 PG — SIGNIFICANT CHANGE UP (ref 27–34)
MCHC RBC-ENTMCNC: 30.5 GM/DL — LOW (ref 32–36)
MCV RBC AUTO: 90.5 FL — SIGNIFICANT CHANGE UP (ref 80–100)
MONOCYTES # BLD AUTO: 0.3 K/UL — SIGNIFICANT CHANGE UP (ref 0–0.9)
MONOCYTES NFR BLD AUTO: 2.7 % — SIGNIFICANT CHANGE UP (ref 2–14)
NEUTROPHILS # BLD AUTO: 9.41 K/UL — HIGH (ref 1.8–7.4)
NEUTROPHILS NFR BLD AUTO: 85 % — HIGH (ref 43–77)
NITRITE UR-MCNC: NEGATIVE — SIGNIFICANT CHANGE UP
PCO2 BLDV: 48 MMHG — SIGNIFICANT CHANGE UP (ref 35–50)
PH BLDV: 7.41 — SIGNIFICANT CHANGE UP (ref 7.32–7.43)
PH UR: 8 — SIGNIFICANT CHANGE UP (ref 5–8)
PLATELET # BLD AUTO: 224 K/UL — SIGNIFICANT CHANGE UP (ref 150–400)
PO2 BLDV: 40 MMHG — SIGNIFICANT CHANGE UP (ref 25–45)
POTASSIUM BLDV-SCNC: 4.8 MMOL/L — HIGH (ref 3.4–4.5)
POTASSIUM SERPL-MCNC: 4.8 MMOL/L — SIGNIFICANT CHANGE UP (ref 3.5–5.3)
POTASSIUM SERPL-SCNC: 4.8 MMOL/L — SIGNIFICANT CHANGE UP (ref 3.5–5.3)
PROCALCITONIN SERPL-MCNC: 30.47 NG/ML — HIGH (ref 0.02–0.1)
PROT SERPL-MCNC: 7 G/DL — SIGNIFICANT CHANGE UP (ref 6.6–8.7)
PROT UR-MCNC: 100 MG/DL
PROTHROM AB SERPL-ACNC: 12.2 SEC — SIGNIFICANT CHANGE UP (ref 10–12.9)
RBC # BLD: 4.1 M/UL — SIGNIFICANT CHANGE UP (ref 3.8–5.2)
RBC # FLD: 14.8 % — HIGH (ref 10.3–14.5)
RBC CASTS # UR COMP ASSIST: ABNORMAL /HPF (ref 0–4)
SAO2 % BLDV: 75 % — SIGNIFICANT CHANGE UP
SODIUM SERPL-SCNC: 139 MMOL/L — SIGNIFICANT CHANGE UP (ref 135–145)
SP GR SPEC: 1.01 — SIGNIFICANT CHANGE UP (ref 1.01–1.02)
TROPONIN T SERPL-MCNC: 0.02 NG/ML — SIGNIFICANT CHANGE UP (ref 0–0.06)
UROBILINOGEN FLD QL: NEGATIVE MG/DL — SIGNIFICANT CHANGE UP
WBC # BLD: 11.07 K/UL — HIGH (ref 3.8–10.5)
WBC # FLD AUTO: 11.07 K/UL — HIGH (ref 3.8–10.5)
WBC UR QL: SIGNIFICANT CHANGE UP

## 2020-05-04 PROCEDURE — 99222 1ST HOSP IP/OBS MODERATE 55: CPT | Mod: CS

## 2020-05-04 PROCEDURE — 93010 ELECTROCARDIOGRAM REPORT: CPT

## 2020-05-04 PROCEDURE — 99291 CRITICAL CARE FIRST HOUR: CPT | Mod: CS,GC

## 2020-05-04 PROCEDURE — 71045 X-RAY EXAM CHEST 1 VIEW: CPT | Mod: 26

## 2020-05-04 RX ORDER — CEFEPIME 1 G/1
1000 INJECTION, POWDER, FOR SOLUTION INTRAMUSCULAR; INTRAVENOUS EVERY 12 HOURS
Refills: 0 | Status: DISCONTINUED | OUTPATIENT
Start: 2020-05-04 | End: 2020-05-05

## 2020-05-04 RX ORDER — FAMOTIDINE 10 MG/ML
20 INJECTION INTRAVENOUS
Refills: 0 | Status: DISCONTINUED | OUTPATIENT
Start: 2020-05-04 | End: 2020-05-05

## 2020-05-04 RX ORDER — VANCOMYCIN HCL 1 G
1000 VIAL (EA) INTRAVENOUS ONCE
Refills: 0 | Status: COMPLETED | OUTPATIENT
Start: 2020-05-04 | End: 2020-05-04

## 2020-05-04 RX ORDER — PIPERACILLIN AND TAZOBACTAM 4; .5 G/20ML; G/20ML
3.38 INJECTION, POWDER, LYOPHILIZED, FOR SOLUTION INTRAVENOUS ONCE
Refills: 0 | Status: COMPLETED | OUTPATIENT
Start: 2020-05-04 | End: 2020-05-04

## 2020-05-04 RX ORDER — MONTELUKAST 4 MG/1
10 TABLET, CHEWABLE ORAL DAILY
Refills: 0 | Status: DISCONTINUED | OUTPATIENT
Start: 2020-05-04 | End: 2020-05-18

## 2020-05-04 RX ORDER — GLUCAGON INJECTION, SOLUTION 0.5 MG/.1ML
1 INJECTION, SOLUTION SUBCUTANEOUS ONCE
Refills: 0 | Status: DISCONTINUED | OUTPATIENT
Start: 2020-05-04 | End: 2020-05-18

## 2020-05-04 RX ORDER — INSULIN LISPRO 100/ML
VIAL (ML) SUBCUTANEOUS EVERY 6 HOURS
Refills: 0 | Status: DISCONTINUED | OUTPATIENT
Start: 2020-05-04 | End: 2020-05-06

## 2020-05-04 RX ORDER — PANTOPRAZOLE SODIUM 20 MG/1
40 TABLET, DELAYED RELEASE ORAL
Refills: 0 | Status: DISCONTINUED | OUTPATIENT
Start: 2020-05-04 | End: 2020-05-05

## 2020-05-04 RX ORDER — INSULIN GLARGINE 100 [IU]/ML
10 INJECTION, SOLUTION SUBCUTANEOUS AT BEDTIME
Refills: 0 | Status: DISCONTINUED | OUTPATIENT
Start: 2020-05-04 | End: 2020-05-18

## 2020-05-04 RX ORDER — LACTULOSE 10 G/15ML
20 SOLUTION ORAL DAILY
Refills: 0 | Status: DISCONTINUED | OUTPATIENT
Start: 2020-05-04 | End: 2020-05-06

## 2020-05-04 RX ORDER — SODIUM CHLORIDE 9 MG/ML
1000 INJECTION, SOLUTION INTRAVENOUS
Refills: 0 | Status: DISCONTINUED | OUTPATIENT
Start: 2020-05-04 | End: 2020-05-18

## 2020-05-04 RX ORDER — DEXTROSE 50 % IN WATER 50 %
12.5 SYRINGE (ML) INTRAVENOUS ONCE
Refills: 0 | Status: DISCONTINUED | OUTPATIENT
Start: 2020-05-04 | End: 2020-05-18

## 2020-05-04 RX ORDER — FENTANYL CITRATE 50 UG/ML
1 INJECTION INTRAVENOUS
Refills: 0 | Status: DISCONTINUED | OUTPATIENT
Start: 2020-05-04 | End: 2020-05-10

## 2020-05-04 RX ORDER — POLYETHYLENE GLYCOL 3350 17 G/17G
17 POWDER, FOR SOLUTION ORAL DAILY
Refills: 0 | Status: DISCONTINUED | OUTPATIENT
Start: 2020-05-04 | End: 2020-05-18

## 2020-05-04 RX ORDER — CHLORHEXIDINE GLUCONATE 213 G/1000ML
15 SOLUTION TOPICAL EVERY 12 HOURS
Refills: 0 | Status: DISCONTINUED | OUTPATIENT
Start: 2020-05-04 | End: 2020-05-18

## 2020-05-04 RX ORDER — DEXTROSE 50 % IN WATER 50 %
25 SYRINGE (ML) INTRAVENOUS ONCE
Refills: 0 | Status: DISCONTINUED | OUTPATIENT
Start: 2020-05-04 | End: 2020-05-18

## 2020-05-04 RX ORDER — CHLORHEXIDINE GLUCONATE 213 G/1000ML
1 SOLUTION TOPICAL
Refills: 0 | Status: DISCONTINUED | OUTPATIENT
Start: 2020-05-04 | End: 2020-05-18

## 2020-05-04 RX ORDER — PANTOPRAZOLE SODIUM 20 MG/1
40 TABLET, DELAYED RELEASE ORAL DAILY
Refills: 0 | Status: DISCONTINUED | OUTPATIENT
Start: 2020-05-04 | End: 2020-05-04

## 2020-05-04 RX ORDER — ACETAMINOPHEN 500 MG
650 TABLET ORAL ONCE
Refills: 0 | Status: COMPLETED | OUTPATIENT
Start: 2020-05-04 | End: 2020-05-04

## 2020-05-04 RX ORDER — ENOXAPARIN SODIUM 100 MG/ML
40 INJECTION SUBCUTANEOUS DAILY
Refills: 0 | Status: DISCONTINUED | OUTPATIENT
Start: 2020-05-04 | End: 2020-05-05

## 2020-05-04 RX ORDER — DEXMEDETOMIDINE HYDROCHLORIDE IN 0.9% SODIUM CHLORIDE 4 UG/ML
0.4 INJECTION INTRAVENOUS
Qty: 200 | Refills: 0 | Status: DISCONTINUED | OUTPATIENT
Start: 2020-05-04 | End: 2020-05-06

## 2020-05-04 RX ORDER — DEXTROSE 50 % IN WATER 50 %
15 SYRINGE (ML) INTRAVENOUS ONCE
Refills: 0 | Status: DISCONTINUED | OUTPATIENT
Start: 2020-05-04 | End: 2020-05-18

## 2020-05-04 RX ORDER — ASCORBIC ACID 60 MG
500 TABLET,CHEWABLE ORAL DAILY
Refills: 0 | Status: DISCONTINUED | OUTPATIENT
Start: 2020-05-04 | End: 2020-05-18

## 2020-05-04 RX ADMIN — PIPERACILLIN AND TAZOBACTAM 3.38 GRAM(S): 4; .5 INJECTION, POWDER, LYOPHILIZED, FOR SOLUTION INTRAVENOUS at 15:53

## 2020-05-04 RX ADMIN — Medication 250 MILLIGRAM(S): at 15:45

## 2020-05-04 RX ADMIN — PIPERACILLIN AND TAZOBACTAM 200 GRAM(S): 4; .5 INJECTION, POWDER, LYOPHILIZED, FOR SOLUTION INTRAVENOUS at 15:03

## 2020-05-04 RX ADMIN — FAMOTIDINE 20 MILLIGRAM(S): 10 INJECTION INTRAVENOUS at 21:53

## 2020-05-04 RX ADMIN — POLYETHYLENE GLYCOL 3350 17 GRAM(S): 17 POWDER, FOR SOLUTION ORAL at 21:53

## 2020-05-04 RX ADMIN — LACTULOSE 20 GRAM(S): 10 SOLUTION ORAL at 21:53

## 2020-05-04 RX ADMIN — FENTANYL CITRATE 1 PATCH: 50 INJECTION INTRAVENOUS at 21:51

## 2020-05-04 RX ADMIN — CEFEPIME 100 MILLIGRAM(S): 1 INJECTION, POWDER, FOR SOLUTION INTRAMUSCULAR; INTRAVENOUS at 21:52

## 2020-05-04 RX ADMIN — ENOXAPARIN SODIUM 40 MILLIGRAM(S): 100 INJECTION SUBCUTANEOUS at 21:52

## 2020-05-04 RX ADMIN — Medication 500 MILLIGRAM(S): at 21:52

## 2020-05-04 RX ADMIN — CHLORHEXIDINE GLUCONATE 1 APPLICATION(S): 213 SOLUTION TOPICAL at 21:52

## 2020-05-04 RX ADMIN — PANTOPRAZOLE SODIUM 40 MILLIGRAM(S): 20 TABLET, DELAYED RELEASE ORAL at 21:53

## 2020-05-04 RX ADMIN — Medication 1 DROP(S): at 21:52

## 2020-05-04 RX ADMIN — Medication 650 MILLIGRAM(S): at 13:36

## 2020-05-04 NOTE — ED ADULT NURSE REASSESSMENT NOTE - NS ED NURSE REASSESS COMMENT FT1
pt remains at baseline mental status, unchanged since arrival to ED. IV sites patent, NSR on cardiac monitor with stable BP at this time. awaiting MICU evaluation. pt remains on vent, trach collar changed by resp therapist. will continue to monitor.

## 2020-05-04 NOTE — H&P ADULT - HISTORY OF PRESENT ILLNESS
73 yo female with hx of frontal lobe dementia, stroke, functional quadriplegia, tracheostomy normally on trach collar, dysphagia s/p PEG, nursing home patient, presents to the ED with fever and worsening hypoxemia.  She tested positive for COVID19 in the nursing home.  In ED, tracheostomy switched to a cuffed trach, patient placed on mechanical ventilation, 65% fio2.

## 2020-05-04 NOTE — ED ADULT NURSE REASSESSMENT NOTE - NS ED NURSE REASSESS COMMENT FT1
pt remains in same state since arrival. remains on vent, NSR on cardiac monitor with stable vital signs. IV sites patent, abx infusing as ordered. awaiting dispo from ICU PA as to whether she is accepted to unit or not.

## 2020-05-04 NOTE — ED PROVIDER NOTE - ATTENDING CONTRIBUTION TO CARE
I, Tejinder Chavez, performed a face to face bedside interview with this patient regarding history of present illness, review of symptoms and relevant past medical, social and family history.  I completed an independent physical examination. I have communicated the patient’s plan of care and disposition with the ACP.  74 year old female with PMH severe dementia, non-verbal at baseline, paraplegia, chronic trach/peg, known covid positive presents from nursing home for worsening hypoxia. Pt unable to provide any elements of the Hx, and found to be hypoxic at 72%  Gen: chronically ill appearing  ENT: trach in palce  CV: RRR  Pul: CTA b/l  Abd: Soft, non-distended, non-tender  Neuro: spastic quadriplegia  Pt needed to be placed back on vent, liekly covid pna, admitted

## 2020-05-04 NOTE — H&P ADULT - NSICDXPASTMEDICALHX_GEN_ALL_CORE_FT
PAST MEDICAL HISTORY:  Advanced dementia     Aphasic stroke     Constipation     COVID-19 virus detected     CVA (cerebral vascular accident)     Dementia of frontal lobe type     Diabetes mellitus     DM (diabetes mellitus)     GERD (gastroesophageal reflux disease)     HTN (hypertension)     Hypertension     Quadriplegia     Respiratory failure     Respiratory failure

## 2020-05-04 NOTE — ED ADULT NURSE REASSESSMENT NOTE - NS ED NURSE REASSESS COMMENT FT1
pt placed on vent at this time by RT. pt remains contracted and withdraws from pain. pt straight cath'd for urine specimen and medicated with MN tylenol for fever.

## 2020-05-04 NOTE — ED PROVIDER NOTE - CLINICAL SUMMARY MEDICAL DECISION MAKING FREE TEXT BOX
74 y/o female with chronic trach, g-tube, cvba w/ quadriplegia and COVID + BIBA from NH for hypoxia and fever x 1 day. Will place on ventilator, labs, ekg, cxray, and ICU consult.

## 2020-05-04 NOTE — ED PROVIDER NOTE - PMH
Advanced dementia    COVID-19 virus detected    CVA (cerebral vascular accident)    DM (diabetes mellitus)    HTN (hypertension)    Quadriplegia

## 2020-05-04 NOTE — H&P ADULT - ASSESSMENT
73 yo female nursing home patient with hx of frontal lobe dementia, stroke, functional quadriplegia, s/p trach/PEG, now with COVID19 viral pneumonia, acute respiratory requiring mechanical ventialtion (normally on trach mask).    Plan:  - mechanical ventilation  - empiric abx pending sputum cultures  - resume home meds  - DVT prophylaxis  daughter updated by phone

## 2020-05-04 NOTE — H&P ADULT - NSICDXPASTSURGICALHX_GEN_ALL_CORE_FT
PAST SURGICAL HISTORY:  Feeding by G-tube     Gastrostomy in place     Hx of appendectomy     Tracheostomy in place     Tracheostomy tube present

## 2020-05-04 NOTE — ED PROVIDER NOTE - CARE PLAN
Principal Discharge DX:	Hypoxia Principal Discharge DX:	Hypoxia  Secondary Diagnosis:	Pneumonia  Secondary Diagnosis:	UTI (urinary tract infection) Principal Discharge DX:	Acute respiratory failure with hypoxia  Secondary Diagnosis:	Pneumonia  Secondary Diagnosis:	UTI (urinary tract infection)

## 2020-05-04 NOTE — ED PROVIDER NOTE - OBJECTIVE STATEMENT
73 y/o female with true of CVA quadriplegia, chronic trach on RA, DM, advanced dementia, and COVID-19 + BIBA from [facility] for 75 y/o female with true of CVA quadriplegia, chronic trach on RA, DM, advanced dementia, and COVID-19 + BIBA from [facility] for hypoxia. She is COVID +, febrile, and prior to arrival was given tylenol, albuterol, and arrived 50% oxygen at 70%. She has been having intermittent hypoxia since yesterday with fever .4. She is alert and non verbal.

## 2020-05-04 NOTE — ED ADULT TRIAGE NOTE - CHIEF COMPLAINT QUOTE
Patient arrived via EMS, baseline mental status as per EMS.  Sent to ED for hypoxia.  Covid19 positive as per EMS.  trach, oxygen noted on arrival to trach.  MD called to bedside on arrival.  20g IV noted to right hand

## 2020-05-04 NOTE — H&P ADULT - NSHPLABSRESULTS_GEN_ALL_CORE
11.3   11.07 )-----------( 224      ( 04 May 2020 13:26 )             37.1   05-04    139  |  98  |  42.0<H>  ----------------------------<  261<H>  4.8   |  26.0  |  1.03    Ca    8.6      04 May 2020 13:26    TPro  7.0  /  Alb  2.8<L>  /  TBili  0.4  /  DBili  x   /  AST  43<H>  /  ALT  26  /  AlkPhos  89  05-04

## 2020-05-05 LAB
A1C WITH ESTIMATED AVERAGE GLUCOSE RESULT: 8.2 % — HIGH (ref 4–5.6)
ANION GAP SERPL CALC-SCNC: 14 MMOL/L — SIGNIFICANT CHANGE UP (ref 5–17)
BASOPHILS # BLD AUTO: 0.02 K/UL — SIGNIFICANT CHANGE UP (ref 0–0.2)
BASOPHILS NFR BLD AUTO: 0.2 % — SIGNIFICANT CHANGE UP (ref 0–2)
BUN SERPL-MCNC: 39 MG/DL — HIGH (ref 8–20)
CALCIUM SERPL-MCNC: 8.1 MG/DL — LOW (ref 8.6–10.2)
CHLORIDE SERPL-SCNC: 101 MMOL/L — SIGNIFICANT CHANGE UP (ref 98–107)
CO2 SERPL-SCNC: 27 MMOL/L — SIGNIFICANT CHANGE UP (ref 22–29)
CREAT SERPL-MCNC: 1.16 MG/DL — SIGNIFICANT CHANGE UP (ref 0.5–1.3)
EOSINOPHIL # BLD AUTO: 0.02 K/UL — SIGNIFICANT CHANGE UP (ref 0–0.5)
EOSINOPHIL NFR BLD AUTO: 0.2 % — SIGNIFICANT CHANGE UP (ref 0–6)
ESTIMATED AVERAGE GLUCOSE: 189 MG/DL — HIGH (ref 68–114)
GLUCOSE SERPL-MCNC: 232 MG/DL — HIGH (ref 70–99)
GRAM STN FLD: SIGNIFICANT CHANGE UP
HCT VFR BLD CALC: 36 % — SIGNIFICANT CHANGE UP (ref 34.5–45)
HGB BLD-MCNC: 11 G/DL — LOW (ref 11.5–15.5)
IMM GRANULOCYTES NFR BLD AUTO: 1 % — SIGNIFICANT CHANGE UP (ref 0–1.5)
LYMPHOCYTES # BLD AUTO: 1.84 K/UL — SIGNIFICANT CHANGE UP (ref 1–3.3)
LYMPHOCYTES # BLD AUTO: 16.6 % — SIGNIFICANT CHANGE UP (ref 13–44)
MAGNESIUM SERPL-MCNC: 2.5 MG/DL — SIGNIFICANT CHANGE UP (ref 1.6–2.6)
MCHC RBC-ENTMCNC: 27.6 PG — SIGNIFICANT CHANGE UP (ref 27–34)
MCHC RBC-ENTMCNC: 30.6 GM/DL — LOW (ref 32–36)
MCV RBC AUTO: 90.2 FL — SIGNIFICANT CHANGE UP (ref 80–100)
MONOCYTES # BLD AUTO: 0.41 K/UL — SIGNIFICANT CHANGE UP (ref 0–0.9)
MONOCYTES NFR BLD AUTO: 3.7 % — SIGNIFICANT CHANGE UP (ref 2–14)
NEUTROPHILS # BLD AUTO: 8.71 K/UL — HIGH (ref 1.8–7.4)
NEUTROPHILS NFR BLD AUTO: 78.3 % — HIGH (ref 43–77)
PHOSPHATE SERPL-MCNC: 2.9 MG/DL — SIGNIFICANT CHANGE UP (ref 2.4–4.7)
PLATELET # BLD AUTO: 250 K/UL — SIGNIFICANT CHANGE UP (ref 150–400)
POTASSIUM SERPL-MCNC: 5.2 MMOL/L — SIGNIFICANT CHANGE UP (ref 3.5–5.3)
POTASSIUM SERPL-SCNC: 5.2 MMOL/L — SIGNIFICANT CHANGE UP (ref 3.5–5.3)
RBC # BLD: 3.99 M/UL — SIGNIFICANT CHANGE UP (ref 3.8–5.2)
RBC # FLD: 14.7 % — HIGH (ref 10.3–14.5)
SODIUM SERPL-SCNC: 142 MMOL/L — SIGNIFICANT CHANGE UP (ref 135–145)
SPECIMEN SOURCE: SIGNIFICANT CHANGE UP
VANCOMYCIN TROUGH SERPL-MCNC: <4 UG/ML — LOW (ref 10–20)
WBC # BLD: 11.11 K/UL — HIGH (ref 3.8–10.5)
WBC # FLD AUTO: 11.11 K/UL — HIGH (ref 3.8–10.5)

## 2020-05-05 PROCEDURE — 99291 CRITICAL CARE FIRST HOUR: CPT | Mod: CS

## 2020-05-05 RX ORDER — MORPHINE SULFATE 50 MG/1
2 CAPSULE, EXTENDED RELEASE ORAL ONCE
Refills: 0 | Status: DISCONTINUED | OUTPATIENT
Start: 2020-05-05 | End: 2020-05-05

## 2020-05-05 RX ORDER — ENOXAPARIN SODIUM 100 MG/ML
40 INJECTION SUBCUTANEOUS EVERY 12 HOURS
Refills: 0 | Status: DISCONTINUED | OUTPATIENT
Start: 2020-05-05 | End: 2020-05-07

## 2020-05-05 RX ORDER — NOREPINEPHRINE BITARTRATE/D5W 8 MG/250ML
0.05 PLASTIC BAG, INJECTION (ML) INTRAVENOUS
Qty: 8 | Refills: 0 | Status: DISCONTINUED | OUTPATIENT
Start: 2020-05-05 | End: 2020-05-06

## 2020-05-05 RX ORDER — MIDODRINE HYDROCHLORIDE 2.5 MG/1
10 TABLET ORAL EVERY 8 HOURS
Refills: 0 | Status: DISCONTINUED | OUTPATIENT
Start: 2020-05-05 | End: 2020-05-07

## 2020-05-05 RX ORDER — SODIUM CHLORIDE 9 MG/ML
500 INJECTION INTRAMUSCULAR; INTRAVENOUS; SUBCUTANEOUS ONCE
Refills: 0 | Status: COMPLETED | OUTPATIENT
Start: 2020-05-05 | End: 2020-05-05

## 2020-05-05 RX ORDER — CEFEPIME 1 G/1
2000 INJECTION, POWDER, FOR SOLUTION INTRAMUSCULAR; INTRAVENOUS EVERY 24 HOURS
Refills: 0 | Status: DISCONTINUED | OUTPATIENT
Start: 2020-05-05 | End: 2020-05-05

## 2020-05-05 RX ORDER — PANTOPRAZOLE SODIUM 20 MG/1
40 TABLET, DELAYED RELEASE ORAL DAILY
Refills: 0 | Status: DISCONTINUED | OUTPATIENT
Start: 2020-05-05 | End: 2020-05-18

## 2020-05-05 RX ADMIN — PANTOPRAZOLE SODIUM 40 MILLIGRAM(S): 20 TABLET, DELAYED RELEASE ORAL at 11:29

## 2020-05-05 RX ADMIN — POLYETHYLENE GLYCOL 3350 17 GRAM(S): 17 POWDER, FOR SOLUTION ORAL at 11:29

## 2020-05-05 RX ADMIN — FENTANYL CITRATE 1 PATCH: 50 INJECTION INTRAVENOUS at 06:18

## 2020-05-05 RX ADMIN — DEXMEDETOMIDINE HYDROCHLORIDE IN 0.9% SODIUM CHLORIDE 5.86 MICROGRAM(S)/KG/HR: 4 INJECTION INTRAVENOUS at 20:10

## 2020-05-05 RX ADMIN — INSULIN GLARGINE 10 UNIT(S): 100 INJECTION, SOLUTION SUBCUTANEOUS at 00:00

## 2020-05-05 RX ADMIN — MIDODRINE HYDROCHLORIDE 10 MILLIGRAM(S): 2.5 TABLET ORAL at 13:56

## 2020-05-05 RX ADMIN — Medication 1 DROP(S): at 13:56

## 2020-05-05 RX ADMIN — CHLORHEXIDINE GLUCONATE 15 MILLILITER(S): 213 SOLUTION TOPICAL at 17:09

## 2020-05-05 RX ADMIN — MIDODRINE HYDROCHLORIDE 10 MILLIGRAM(S): 2.5 TABLET ORAL at 05:52

## 2020-05-05 RX ADMIN — MORPHINE SULFATE 2 MILLIGRAM(S): 50 CAPSULE, EXTENDED RELEASE ORAL at 16:25

## 2020-05-05 RX ADMIN — ENOXAPARIN SODIUM 40 MILLIGRAM(S): 100 INJECTION SUBCUTANEOUS at 17:09

## 2020-05-05 RX ADMIN — Medication 500 MILLIGRAM(S): at 11:42

## 2020-05-05 RX ADMIN — MONTELUKAST 10 MILLIGRAM(S): 4 TABLET, CHEWABLE ORAL at 11:29

## 2020-05-05 RX ADMIN — FAMOTIDINE 20 MILLIGRAM(S): 10 INJECTION INTRAVENOUS at 05:52

## 2020-05-05 RX ADMIN — INSULIN GLARGINE 10 UNIT(S): 100 INJECTION, SOLUTION SUBCUTANEOUS at 22:34

## 2020-05-05 RX ADMIN — DEXMEDETOMIDINE HYDROCHLORIDE IN 0.9% SODIUM CHLORIDE 5.86 MICROGRAM(S)/KG/HR: 4 INJECTION INTRAVENOUS at 00:53

## 2020-05-05 RX ADMIN — CHLORHEXIDINE GLUCONATE 15 MILLILITER(S): 213 SOLUTION TOPICAL at 05:51

## 2020-05-05 RX ADMIN — Medication 1 TABLET(S): at 11:42

## 2020-05-05 RX ADMIN — Medication 3: at 22:35

## 2020-05-05 RX ADMIN — SODIUM CHLORIDE 2000 MILLILITER(S): 9 INJECTION INTRAMUSCULAR; INTRAVENOUS; SUBCUTANEOUS at 04:00

## 2020-05-05 RX ADMIN — LACTULOSE 20 GRAM(S): 10 SOLUTION ORAL at 11:29

## 2020-05-05 RX ADMIN — Medication 1 DROP(S): at 05:51

## 2020-05-05 RX ADMIN — CHLORHEXIDINE GLUCONATE 1 APPLICATION(S): 213 SOLUTION TOPICAL at 05:52

## 2020-05-05 RX ADMIN — Medication 2: at 11:30

## 2020-05-05 RX ADMIN — FENTANYL CITRATE 1 PATCH: 50 INJECTION INTRAVENOUS at 17:09

## 2020-05-05 RX ADMIN — Medication 1 DROP(S): at 21:18

## 2020-05-05 RX ADMIN — MIDODRINE HYDROCHLORIDE 10 MILLIGRAM(S): 2.5 TABLET ORAL at 21:18

## 2020-05-05 RX ADMIN — Medication 2: at 05:52

## 2020-05-05 RX ADMIN — Medication 2: at 00:00

## 2020-05-05 RX ADMIN — Medication 2: at 17:09

## 2020-05-05 RX ADMIN — CEFEPIME 100 MILLIGRAM(S): 1 INJECTION, POWDER, FOR SOLUTION INTRAMUSCULAR; INTRAVENOUS at 05:51

## 2020-05-05 NOTE — PROGRESS NOTE ADULT - ASSESSMENT
Assessment and Plan:   · Assessment		  Impression:  1. acute hypoxemic respiratory failure  2. COVID-19  3. ARDS  4. diabetes mellitus, hyperglycemia  5. shock, not otherwise specified    Plan:    Neuro - precedex for light sedation and safe ventilation    CV -  Pressor support as needed to maintain MAP> 65           midodrine addied  to facilitate maintenance off pressors    Pulm -  ARDS-NET 4-6cc/kg IBW TV as able to maintain plateau pressures <30             actively titration of FiO2 to achieve sats>90%             utilization of PEEP for alveolar recruitment             Vent bundle in place and reviewed     GI -  PPI  Enteric feeds as tolerated    Renal - Cr stable, strict I/Os, urine output>0.5cc/kg/hr, repeat BMP     Heme -  adjust lovenox to BID in addition to SCDs given high risk of thrombosis given COVID    ID -  no secretions, ABx DC;d  - observe off for now. follow up cxs. Further Abx based on clinical features, culture data, and judicious procalcitonin          monitoring.      Endo -  Aggressive glycemic control to limit FS glucose to < 180mg/dl, cont lantus/ISS coverage [id number].       COVID 19 specific considerations and therapeutic  options based on the available and rapidly changing literature    Goals of care considerations:  Ongoing assessment for patient specific treatment options based on progression or decline.      35  Minutes of critical care time spent in the management of this critically ill COVID-19 patient/PUI patient with continuous assessments and interventions based on the interpretation of multiple databases.

## 2020-05-05 NOTE — PROGRESS NOTE ADULT - SUBJECTIVE AND OBJECTIVE BOX
Patient is a 74y old  Female who presents with a chief complaint of acute respiratory failure (05 May 2020 04:38)      BRIEF HOSPITAL COURSE: 74F with PMHx of frontal lobe dementia, stroke, functional quadriplegia, tracheostomy (on TC at facility), dysphagia s/p PEG who resides in NH presents with fever and worsening hypoxemia. + COVID at NH . Trach changed to cuff and pt placed on full vent support. Transferred to ICU.      Events last 24 hours: Intermittent fever and tugging on vent    PAST MEDICAL & SURGICAL HISTORY:  Quadriplegia  COVID-19 virus detected  Advanced dementia  DM (diabetes mellitus)  HTN (hypertension)  CVA (cerebral vascular accident)  Respiratory failure  Constipation  GERD (gastroesophageal reflux disease)  Hypertension  Respiratory failure  Diabetes mellitus  Aphasic stroke  Dementia of frontal lobe type  Feeding by G-tube  Tracheostomy tube present  Tracheostomy in place  Gastrostomy in place  Hx of appendectomy        Medications:    midodrine 10 milliGRAM(s) Oral every 8 hours  norepinephrine Infusion 0.05 MICROgram(s)/kG/Min IV Continuous <Continuous>    montelukast 10 milliGRAM(s) Oral daily    dexMEDEtomidine Infusion 0.4 MICROgram(s)/kG/Hr IV Continuous <Continuous>  fentaNYL   Patch  12 MICROgram(s)/Hr 1 Patch Transdermal every 72 hours      enoxaparin Injectable 40 milliGRAM(s) SubCutaneous every 12 hours    famotidine    Tablet 20 milliGRAM(s) Oral two times a day  lactulose Syrup 20 Gram(s) Oral daily  pantoprazole   Suspension 40 milliGRAM(s) Oral daily  polyethylene glycol 3350 17 Gram(s) Oral daily      dextrose 40% Gel 15 Gram(s) Oral once PRN  dextrose 50% Injectable 12.5 Gram(s) IV Push once  dextrose 50% Injectable 25 Gram(s) IV Push once  dextrose 50% Injectable 25 Gram(s) IV Push once  glucagon  Injectable 1 milliGRAM(s) IntraMuscular once PRN  insulin glargine Injectable (LANTUS) 10 Unit(s) SubCutaneous at bedtime  insulin lispro (HumaLOG) corrective regimen sliding scale   SubCutaneous every 6 hours    ascorbic acid 500 milliGRAM(s) Oral daily  calcium carbonate 1250 mG  + Vitamin D (OsCal 500 + D) 1 Tablet(s) Oral daily  dextrose 5%. 1000 milliLiter(s) IV Continuous <Continuous>      artificial  tears Solution 1 Drop(s) Both EYES three times a day  chlorhexidine 0.12% Liquid 15 milliLiter(s) Oral Mucosa every 12 hours  chlorhexidine 4% Liquid 1 Application(s) Topical <User Schedule>        Mode: AC/ CMV (Assist Control/ Continuous Mandatory Ventilation)  RR (machine): 14  TV (machine): 380  FiO2: 50  PEEP: 10  ITime: 0.6  MAP: 19  PIP: 43      ICU Vital Signs Last 24 Hrs  T(C): 37 (05 May 2020 07:51), Max: 38.7 (04 May 2020 21:00)  T(F): 98.6 (05 May 2020 07:51), Max: 101.7 (04 May 2020 21:00)  HR: 91 (05 May 2020 08:44) (70 - 104)  BP: 121/63 (05 May 2020 08:00) (111/58 - 139/61)  BP(mean): 76 (05 May 2020 08:00) (74 - 82)  ABP: --  ABP(mean): --  RR: 21 (05 May 2020 08:00) (21 - 26)  SpO2: 96% (05 May 2020 08:44) (73% - 100%)          I&O's Detail    04 May 2020 07:01  -  05 May 2020 07:00  --------------------------------------------------------  IN:    dexmedetomidine Infusion: 29.2 mL    Glucerna 1.5: 60 mL    ns in tub fed  [id number]: 180 mL    Sodium Chloride 0.9% IV Bolus: 500 mL    Solution: 100 mL  Total IN: 869.2 mL    OUT:    Indwelling Catheter - Urethral: 435 mL  Total OUT: 435 mL    Total NET: 434.2 mL      05 May 2020 07:01  -  05 May 2020 10:43  --------------------------------------------------------  IN:    Glucerna 1.5: 60 mL  Total IN: 60 mL    OUT:    Indwelling Catheter - Urethral: 80 mL  Total OUT: 80 mL    Total NET: -20 mL            LABS:                        11.0   11.11 )-----------( 250      ( 05 May 2020 04:48 )             36.0     05-05    142  |  101  |  39.0<H>  ----------------------------<  232<H>  5.2   |  27.0  |  1.16    Ca    8.1<L>      05 May 2020 04:48  Phos  2.9     05-05  Mg     2.5     05-05    TPro  7.0  /  Alb  2.8<L>  /  TBili  0.4  /  DBili  x   /  AST  43<H>  /  ALT  26  /  AlkPhos  89  05-04      CARDIAC MARKERS ( 04 May 2020 13:26 )  x     / 0.02 ng/mL / x     / x     / x          CAPILLARY BLOOD GLUCOSE        PT/INR - ( 04 May 2020 13:26 )   PT: 12.2 sec;   INR: 1.08 ratio         PTT - ( 04 May 2020 13:26 )  PTT:39.9 sec  Urinalysis Basic - ( 04 May 2020 14:26 )    Color: Yellow / Appearance: Clear / S.010 / pH: x  Gluc: x / Ketone: Trace  / Bili: Negative / Urobili: Negative mg/dL   Blood: x / Protein: 100 mg/dL / Nitrite: Negative   Leuk Esterase: Moderate / RBC: 3-5 /HPF / WBC 3-5   Sq Epi: x / Non Sq Epi: Occasional / Bacteria: Few      CULTURES:      Physical Examination:    General: No acute distress.      HEENT: Pupils equal, reactive to light.  Symmetric.    PULM: Clear to auscultation bilaterally, no significant sputum production    NECK: Supple, no lymphadenopathy, trachea midline    CVS: Regular rate and rhythm, no murmurs, rubs, or gallops    ABD: Soft, nondistended, nontender, normoactive bowel sounds, no masses    EXT: No edema, nontender    SKIN: Warm and well perfused, no rashes noted.        DEVICES:     RADIOLOGY:      EXAM:  XR CHEST PORTABLE IMMED 1V                          PROCEDURE DATE:  2020      INTERPRETATION:    Examination: XR CHEST IMMEDIATE    History: , Shortness of Breath, Cough,  Fever    /TECHNIQUE:  Portable  AP view of the chest was obtained.    COMPARISON: No previous examinations are available for review.    FINDINGS: Tracheostomy tube in place.  The lungs  show bilateral perihilar and peripheral lung airspace consolidations concerning for infectious pneumonia.    The heart andmediastinum are within normal limits.    Visualized osseous structures are intact.    IMPRESSION:   Bilateral perihilar and peripheral airspace consolidations.  Tracheostomy tube in place.  .  HANSEL LOJA M.D., ATTENDING RADIOLOGIST  This document has been electronically signed. May  4 2020  7:07PM      CRITICAL CARE TIME SPENT: 35 Updated daughter, Brad ([phone]) - spouse also joined the call.  Oxygenation and BP improved.  Appears to be purely COVID PNA at present. Spouse asked about Remdisivir - I said it was not indicated    Patient is a 74y old  Female who presents with a chief complaint of acute respiratory failure (05 May 2020 04:38)      BRIEF HOSPITAL COURSE: 74F with PMHx of frontal lobe dementia, stroke, functional quadriplegia, tracheostomy (on TC at facility), dysphagia s/p PEG who resides in NH presents with fever and worsening hypoxemia. + COVID at NH . Trach changed to cuff and pt placed on full vent support. Transferred to ICU.      Events last 24 hours: Intermittent fever and tugging on vent    PAST MEDICAL & SURGICAL HISTORY:  Quadriplegia  COVID-19 virus detected  Advanced dementia  DM (diabetes mellitus)  HTN (hypertension)  CVA (cerebral vascular accident)  Respiratory failure  Constipation  GERD (gastroesophageal reflux disease)  Hypertension  Respiratory failure  Diabetes mellitus  Aphasic stroke  Dementia of frontal lobe type  Feeding by G-tube  Tracheostomy tube present  Tracheostomy in place  Gastrostomy in place  Hx of appendectomy        Medications:    midodrine 10 milliGRAM(s) Oral every 8 hours  norepinephrine Infusion 0.05 MICROgram(s)/kG/Min IV Continuous <Continuous>    montelukast 10 milliGRAM(s) Oral daily    dexMEDEtomidine Infusion 0.4 MICROgram(s)/kG/Hr IV Continuous <Continuous>  fentaNYL   Patch  12 MICROgram(s)/Hr 1 Patch Transdermal every 72 hours      enoxaparin Injectable 40 milliGRAM(s) SubCutaneous every 12 hours    famotidine    Tablet 20 milliGRAM(s) Oral two times a day  lactulose Syrup 20 Gram(s) Oral daily  pantoprazole   Suspension 40 milliGRAM(s) Oral daily  polyethylene glycol 3350 17 Gram(s) Oral daily      dextrose 40% Gel 15 Gram(s) Oral once PRN  dextrose 50% Injectable 12.5 Gram(s) IV Push once  dextrose 50% Injectable 25 Gram(s) IV Push once  dextrose 50% Injectable 25 Gram(s) IV Push once  glucagon  Injectable 1 milliGRAM(s) IntraMuscular once PRN  insulin glargine Injectable (LANTUS) 10 Unit(s) SubCutaneous at bedtime  insulin lispro (HumaLOG) corrective regimen sliding scale   SubCutaneous every 6 hours    ascorbic acid 500 milliGRAM(s) Oral daily  calcium carbonate 1250 mG  + Vitamin D (OsCal 500 + D) 1 Tablet(s) Oral daily  dextrose 5%. 1000 milliLiter(s) IV Continuous <Continuous>      artificial  tears Solution 1 Drop(s) Both EYES three times a day  chlorhexidine 0.12% Liquid 15 milliLiter(s) Oral Mucosa every 12 hours  chlorhexidine 4% Liquid 1 Application(s) Topical <User Schedule>        Mode: AC/ CMV (Assist Control/ Continuous Mandatory Ventilation)  RR (machine): 14  TV (machine): 380  FiO2: 50  PEEP: 10  ITime: 0.6  MAP: 19  PIP: 43      ICU Vital Signs Last 24 Hrs  T(C): 37 (05 May 2020 07:51), Max: 38.7 (04 May 2020 21:00)  T(F): 98.6 (05 May 2020 07:51), Max: 101.7 (04 May 2020 21:00)  HR: 91 (05 May 2020 08:44) (70 - 104)  BP: 121/63 (05 May 2020 08:00) (111/58 - 139/61)  BP(mean): 76 (05 May 2020 08:00) (74 - 82)  ABP: --  ABP(mean): --  RR: 21 (05 May 2020 08:00) (21 - 26)  SpO2: 96% (05 May 2020 08:44) (73% - 100%)          I&O's Detail    04 May 2020 07:01  -  05 May 2020 07:00  --------------------------------------------------------  IN:    dexmedetomidine Infusion: 29.2 mL    Glucerna 1.5: 60 mL    ns in tub fed  [id number]: 180 mL    Sodium Chloride 0.9% IV Bolus: 500 mL    Solution: 100 mL  Total IN: 869.2 mL    OUT:    Indwelling Catheter - Urethral: 435 mL  Total OUT: 435 mL    Total NET: 434.2 mL      05 May 2020 07:01  -  05 May 2020 10:43  --------------------------------------------------------  IN:    Glucerna 1.5: 60 mL  Total IN: 60 mL    OUT:    Indwelling Catheter - Urethral: 80 mL  Total OUT: 80 mL    Total NET: -20 mL            LABS:                        11.0   11.11 )-----------( 250      ( 05 May 2020 04:48 )             36.0     05-05    142  |  101  |  39.0<H>  ----------------------------<  232<H>  5.2   |  27.0  |  1.16    Ca    8.1<L>      05 May 2020 04:48  Phos  2.9     05-05  Mg     2.5     05-05    TPro  7.0  /  Alb  2.8<L>  /  TBili  0.4  /  DBili  x   /  AST  43<H>  /  ALT  26  /  AlkPhos  89  05-04      CARDIAC MARKERS ( 04 May 2020 13:26 )  x     / 0.02 ng/mL / x     / x     / x          CAPILLARY BLOOD GLUCOSE        PT/INR - ( 04 May 2020 13:26 )   PT: 12.2 sec;   INR: 1.08 ratio         PTT - ( 04 May 2020 13:26 )  PTT:39.9 sec  Urinalysis Basic - ( 04 May 2020 14:26 )    Color: Yellow / Appearance: Clear / S.010 / pH: x  Gluc: x / Ketone: Trace  / Bili: Negative / Urobili: Negative mg/dL   Blood: x / Protein: 100 mg/dL / Nitrite: Negative   Leuk Esterase: Moderate / RBC: 3-5 /HPF / WBC 3-5   Sq Epi: x / Non Sq Epi: Occasional / Bacteria: Few      CULTURES:      Physical Examination:    General: No acute distress.      HEENT: Pupils equal, reactive to light.  Symmetric.    PULM: Clear to auscultation bilaterally, no significant sputum production    NECK: Supple, no lymphadenopathy, trachea midline    CVS: Regular rate and rhythm, no murmurs, rubs, or gallops    ABD: Soft, nondistended, nontender, normoactive bowel sounds, no masses    EXT: No edema, nontender    SKIN: Warm and well perfused, no rashes noted.        DEVICES:     RADIOLOGY:      EXAM:  XR CHEST PORTABLE IMMED 1V                          PROCEDURE DATE:  2020      INTERPRETATION:    Examination: XR CHEST IMMEDIATE    History: , Shortness of Breath, Cough,  Fever    /TECHNIQUE:  Portable  AP view of the chest was obtained.    COMPARISON: No previous examinations are available for review.    FINDINGS: Tracheostomy tube in place.  The lungs  show bilateral perihilar and peripheral lung airspace consolidations concerning for infectious pneumonia.    The heart andmediastinum are within normal limits.    Visualized osseous structures are intact.    IMPRESSION:   Bilateral perihilar and peripheral airspace consolidations.  Tracheostomy tube in place.  .  HANSEL LOJA M.D., ATTENDING RADIOLOGIST  This document has been electronically signed. May  4 2020  7:07PM      CRITICAL CARE TIME SPENT: 35

## 2020-05-05 NOTE — PROGRESS NOTE ADULT - ASSESSMENT
Impression:  1. acute hypoxemic respiratory failure  2. COVID-19  3. ARDS  4. lobar PNA  5. diabetes mellitus, hyperglycemia    Plan:    Neuro - precedex for light sedation and safe ventilation    CV -  Pressor support as needed to maintain MAP> 65          adding midodrine to facilitate maintenance off pressors    Pulm -  ARDS-NET 4-6cc/kg IBW TV as able to maintain plateau pressures <30             actively titration of FiO2 to achieve sats>90%             utilization of PEEP for alveolar recruitment             Vent bundle in place and reviewed     GI -  PPI  Enteric feeds as tolerated    Renal - Cr stable, strict I/Os, urine output>0.5cc/kg/hr, repeat BMP     Heme -  adjust lovenox to BID in addition to SCDs given high risk of thrombosis given COVID    ID - fevers, procal elevated, leukocytosis. Hx pseudomonas in SCx, change Cefepime to 2gm [id number] adjusted for CrCl, cont Vanco with level, check level          this am. Abx discontinuation based on clinical features, culture data, and judicious procalcitonin monitoring.      Endo -  Aggressive glycemic control to limit FS glucose to < 180mg/dl, change to glucerna, cont lantus/ISS coverage [id number].       COVID 19 specific considerations and therapeutic  options based on the available and rapidly changing literature    Goals of care considerations:  Ongoing assessment for patient specific treatment options based on progression or decline.      40  Minutes of critical care time spent in the management of this critically ill COVID-19 patient/PUI patient with continuous assessments and interventions based on the interpretation of multiple databases. Impression:  1. acute hypoxemic respiratory failure  2. COVID-19  3. ARDS  4. lobar PNA  5. diabetes mellitus, hyperglycemia    Plan:    Neuro - precedex for light sedation and safe ventilation    CV -  Pressor support as needed to maintain MAP> 65          adding midodrine to facilitate maintenance off pressors    Pulm -  ARDS-NET 4-6cc/kg IBW TV as able to maintain plateau pressures <30             actively titration of FiO2 to achieve sats>90%             utilization of PEEP for alveolar recruitment             Vent bundle in place and reviewed     GI -  PPI  Enteric feeds as tolerated    Renal - Cr stable, strict I/Os, urine output>0.5cc/kg/hr, repeat BMP     Heme -  adjust lovenox to BID in addition to SCDs given high risk of thrombosis given COVID    ID -  no secretions, will d/c abx and observe off for now. follow up cxs. Further Abx based on clinical features, culture data, and judicious procalcitonin          monitoring.      Endo -  Aggressive glycemic control to limit FS glucose to < 180mg/dl, change to glucerna, cont lantus/ISS coverage [id number].       COVID 19 specific considerations and therapeutic  options based on the available and rapidly changing literature    Goals of care considerations:  Ongoing assessment for patient specific treatment options based on progression or decline.      40  Minutes of critical care time spent in the management of this critically ill COVID-19 patient/PUI patient with continuous assessments and interventions based on the interpretation of multiple databases. Impression:  1. acute hypoxemic respiratory failure  2. COVID-19  3. ARDS  4. lobar PNA  5. diabetes mellitus, hyperglycemia  6. shock, not otherwise specified    Plan:    Neuro - precedex for light sedation and safe ventilation    CV -  Pressor support as needed to maintain MAP> 65          adding midodrine to facilitate maintenance off pressors    Pulm -  ARDS-NET 4-6cc/kg IBW TV as able to maintain plateau pressures <30             actively titration of FiO2 to achieve sats>90%             utilization of PEEP for alveolar recruitment             Vent bundle in place and reviewed     GI -  PPI  Enteric feeds as tolerated    Renal - Cr stable, strict I/Os, urine output>0.5cc/kg/hr, repeat BMP     Heme -  adjust lovenox to BID in addition to SCDs given high risk of thrombosis given COVID    ID -  no secretions, will d/c abx and observe off for now. follow up cxs. Further Abx based on clinical features, culture data, and judicious procalcitonin          monitoring.      Endo -  Aggressive glycemic control to limit FS glucose to < 180mg/dl, change to glucerna, cont lantus/ISS coverage [id number].       COVID 19 specific considerations and therapeutic  options based on the available and rapidly changing literature    Goals of care considerations:  Ongoing assessment for patient specific treatment options based on progression or decline.      40  Minutes of critical care time spent in the management of this critically ill COVID-19 patient/PUI patient with continuous assessments and interventions based on the interpretation of multiple databases. Impression:  1. acute hypoxemic respiratory failure  2. COVID-19  3. ARDS  4. diabetes mellitus, hyperglycemia  5. shock, not otherwise specified    Plan:    Neuro - precedex for light sedation and safe ventilation    CV -  Pressor support as needed to maintain MAP> 65          adding midodrine to facilitate maintenance off pressors    Pulm -  ARDS-NET 4-6cc/kg IBW TV as able to maintain plateau pressures <30             actively titration of FiO2 to achieve sats>90%             utilization of PEEP for alveolar recruitment             Vent bundle in place and reviewed     GI -  PPI  Enteric feeds as tolerated    Renal - Cr stable, strict I/Os, urine output>0.5cc/kg/hr, repeat BMP     Heme -  adjust lovenox to BID in addition to SCDs given high risk of thrombosis given COVID    ID -  no secretions, will d/c abx and observe off for now. follow up cxs. Further Abx based on clinical features, culture data, and judicious procalcitonin          monitoring.      Endo -  Aggressive glycemic control to limit FS glucose to < 180mg/dl, change to glucerna, cont lantus/ISS coverage [id number].       COVID 19 specific considerations and therapeutic  options based on the available and rapidly changing literature    Goals of care considerations:  Ongoing assessment for patient specific treatment options based on progression or decline.      40  Minutes of critical care time spent in the management of this critically ill COVID-19 patient/PUI patient with continuous assessments and interventions based on the interpretation of multiple databases.

## 2020-05-05 NOTE — PROGRESS NOTE ADULT - SUBJECTIVE AND OBJECTIVE BOX
Patient is a 74y old  Female who presents with a chief complaint of acute respiratory failure (05 May 2020 04:32)      BRIEF HOSPITAL COURSE:   74F with PMHx of frontal lobe dementia, stroke, functional quadriplegia, tracheostomy (on TC at facility), dysphagia s/p PEG who resides in NH presents with fever and worsening hypoxemia. + COVID at NH 4/27. Trach changed to cuff and pt placed on full vent support. Transferred to ICU.      Events last 24 hours: Received on 60% weaned to 40%, +10, BP soft overnight, fluid bolus x 1 given, intermittent fevers.    PAST MEDICAL & SURGICAL HISTORY:  Quadriplegia  COVID-19 virus detected  Advanced dementia  DM (diabetes mellitus)  HTN (hypertension)  CVA (cerebral vascular accident)  Respiratory failure  Constipation  GERD (gastroesophageal reflux disease)  Hypertension  Respiratory failure  Diabetes mellitus  Aphasic stroke  Dementia of frontal lobe type  Feeding by G-tube  Tracheostomy tube present  Tracheostomy in place  Gastrostomy in place  Hx of appendectomy        Hosp day #1d    Vent day # 1d  Mode: AC/ CMV (Assist Control/ Continuous Mandatory Ventilation)  RR (machine): 10  TV (machine): 350  FiO2: 40  PEEP: 10  ITime: 0.6  MAP: 15  PIP: 31        Vital signs / Reviewed and Physical Exam Performed where pertinent and urgently required    Lab / Radiology  studies / ABG / Meds -  reviewed and interpreted into the assessment and treatment plan.

## 2020-05-05 NOTE — PROGRESS NOTE ADULT - SUBJECTIVE AND OBJECTIVE BOX
Patient is a 74y old  Female who presents with a chief complaint of acute respiratory failure (04 May 2020 18:44)      BRIEF HOSPITAL COURSE: 74y Female  ***    Events last 24 hours: ***    PAST MEDICAL & SURGICAL HISTORY:  Quadriplegia  COVID-19 virus detected  Advanced dementia  DM (diabetes mellitus)  HTN (hypertension)  CVA (cerebral vascular accident)  Respiratory failure  Constipation  GERD (gastroesophageal reflux disease)  Hypertension  Respiratory failure  Diabetes mellitus  Aphasic stroke  Dementia of frontal lobe type  Feeding by G-tube  Tracheostomy tube present  Tracheostomy in place  Gastrostomy in place  Hx of appendectomy        Hosp day #1d    Vent day #  Mode: AC/ CMV (Assist Control/ Continuous Mandatory Ventilation)  RR (machine): 10  TV (machine): 350  FiO2: 40  PEEP: 10  ITime: 0.6  MAP: 15  PIP: 31        Vital signs / Reviewed and Physical Exam Performed where pertinent and urgently required    Lab / Radiology  studies / ABG / Meds -  reviewed and interpreted into the assessment and treatment plan.      Assessment/Plan/Therapeutic interventions      Neuro - Sedation neuromuscular blockade to facilitate safe ventilation    CV -  Pressor support as needed to maintain MAP 65           Avoiding fluid challenges          QTC monitoring while on Azithromycin and Hydroxychloroquine.    Pulm -  ARDS-NET 4-6cc/kg IBW TV as able to maintain plateau pressures <30               Prone ventilation consideration as feasible  Pa02/Fi02 < 150 on Fi02 >60% and PEEP at least 5                 Vent bundle Reviewed     GI -  PPI  Enteric feeds as tolerated in tandem with NMB and prone ventilation    Renal - Even to negative fluid balance as tolerated by hemodynamics and renal fx.  Feeds to be provided in lieu of IVF.     Heme -  Pharmacologic DVT PPx  in addition to SCD's    ID - ABX discontinuation based on discussion with ID in conjunction with clinical features, culture data, and judicious procalcitonin monitoring.      Endo -  Aggressive glycemic control to limit FS glucose to < 180mg/dl.      COVID 19 specific considerations and therapeutic  options based on the available and rapidly changing literature    Goals of care considerations:  Ongoing assessment for patient specific treatment options based on progression or decline.  I have involved the family with updates and requests in guidance for medical decision making.          38  Minutes of critical care tiem spent in the management of this critically ill COVID-19 patient/PUI patient with continuous assessments and interventions based on the interpretation of multiple databases.

## 2020-05-06 LAB
-  AMIKACIN: SIGNIFICANT CHANGE UP
-  AMPICILLIN/SULBACTAM: SIGNIFICANT CHANGE UP
-  AMPICILLIN: SIGNIFICANT CHANGE UP
-  AZTREONAM: SIGNIFICANT CHANGE UP
-  CEFAZOLIN: SIGNIFICANT CHANGE UP
-  CEFEPIME: SIGNIFICANT CHANGE UP
-  CEFOXITIN: SIGNIFICANT CHANGE UP
-  CEFTRIAXONE: SIGNIFICANT CHANGE UP
-  CIPROFLOXACIN: SIGNIFICANT CHANGE UP
-  GENTAMICIN: SIGNIFICANT CHANGE UP
-  LEVOFLOXACIN: SIGNIFICANT CHANGE UP
-  MEROPENEM: SIGNIFICANT CHANGE UP
-  NITROFURANTOIN: SIGNIFICANT CHANGE UP
-  PIPERACILLIN/TAZOBACTAM: SIGNIFICANT CHANGE UP
-  TOBRAMYCIN: SIGNIFICANT CHANGE UP
-  TRIMETHOPRIM/SULFAMETHOXAZOLE: SIGNIFICANT CHANGE UP
ALBUMIN SERPL ELPH-MCNC: 2.4 G/DL — LOW (ref 3.3–5.2)
ALP SERPL-CCNC: 79 U/L — SIGNIFICANT CHANGE UP (ref 40–120)
ALT FLD-CCNC: 21 U/L — SIGNIFICANT CHANGE UP
ANION GAP SERPL CALC-SCNC: 13 MMOL/L — SIGNIFICANT CHANGE UP (ref 5–17)
AST SERPL-CCNC: 25 U/L — SIGNIFICANT CHANGE UP
BILIRUB SERPL-MCNC: 0.5 MG/DL — SIGNIFICANT CHANGE UP (ref 0.4–2)
BUN SERPL-MCNC: 37 MG/DL — HIGH (ref 8–20)
CALCIUM SERPL-MCNC: 8.2 MG/DL — LOW (ref 8.6–10.2)
CHLORIDE SERPL-SCNC: 102 MMOL/L — SIGNIFICANT CHANGE UP (ref 98–107)
CO2 SERPL-SCNC: 25 MMOL/L — SIGNIFICANT CHANGE UP (ref 22–29)
CREAT SERPL-MCNC: 0.88 MG/DL — SIGNIFICANT CHANGE UP (ref 0.5–1.3)
CULTURE RESULTS: SIGNIFICANT CHANGE UP
GLUCOSE BLDC GLUCOMTR-MCNC: 179 MG/DL — HIGH (ref 70–99)
GLUCOSE BLDC GLUCOMTR-MCNC: 210 MG/DL — HIGH (ref 70–99)
GLUCOSE BLDC GLUCOMTR-MCNC: 222 MG/DL — HIGH (ref 70–99)
GLUCOSE BLDC GLUCOMTR-MCNC: 229 MG/DL — HIGH (ref 70–99)
GLUCOSE BLDC GLUCOMTR-MCNC: 257 MG/DL — HIGH (ref 70–99)
GLUCOSE SERPL-MCNC: 239 MG/DL — HIGH (ref 70–99)
HCT VFR BLD CALC: 31.6 % — LOW (ref 34.5–45)
HGB BLD-MCNC: 9.6 G/DL — LOW (ref 11.5–15.5)
MAGNESIUM SERPL-MCNC: 2.8 MG/DL — HIGH (ref 1.8–2.6)
MCHC RBC-ENTMCNC: 28.2 PG — SIGNIFICANT CHANGE UP (ref 27–34)
MCHC RBC-ENTMCNC: 30.4 GM/DL — LOW (ref 32–36)
MCV RBC AUTO: 92.9 FL — SIGNIFICANT CHANGE UP (ref 80–100)
METHOD TYPE: SIGNIFICANT CHANGE UP
ORGANISM # SPEC MICROSCOPIC CNT: SIGNIFICANT CHANGE UP
ORGANISM # SPEC MICROSCOPIC CNT: SIGNIFICANT CHANGE UP
PLATELET # BLD AUTO: 226 K/UL — SIGNIFICANT CHANGE UP (ref 150–400)
POTASSIUM SERPL-MCNC: 5.8 MMOL/L — HIGH (ref 3.5–5.3)
POTASSIUM SERPL-SCNC: 5.8 MMOL/L — HIGH (ref 3.5–5.3)
PROT SERPL-MCNC: 6.3 G/DL — LOW (ref 6.6–8.7)
RBC # BLD: 3.4 M/UL — LOW (ref 3.8–5.2)
RBC # FLD: 14.8 % — HIGH (ref 10.3–14.5)
SODIUM SERPL-SCNC: 140 MMOL/L — SIGNIFICANT CHANGE UP (ref 135–145)
SPECIMEN SOURCE: SIGNIFICANT CHANGE UP
WBC # BLD: 6.53 K/UL — SIGNIFICANT CHANGE UP (ref 3.8–10.5)
WBC # FLD AUTO: 6.53 K/UL — SIGNIFICANT CHANGE UP (ref 3.8–10.5)

## 2020-05-06 PROCEDURE — 99233 SBSQ HOSP IP/OBS HIGH 50: CPT | Mod: CS,GC

## 2020-05-06 RX ORDER — MORPHINE SULFATE 50 MG/1
2 CAPSULE, EXTENDED RELEASE ORAL EVERY 4 HOURS
Refills: 0 | Status: DISCONTINUED | OUTPATIENT
Start: 2020-05-06 | End: 2020-05-13

## 2020-05-06 RX ORDER — SODIUM CHLORIDE 9 MG/ML
500 INJECTION INTRAMUSCULAR; INTRAVENOUS; SUBCUTANEOUS ONCE
Refills: 0 | Status: DISCONTINUED | OUTPATIENT
Start: 2020-05-06 | End: 2020-05-13

## 2020-05-06 RX ORDER — INSULIN GLARGINE 100 [IU]/ML
10 INJECTION, SOLUTION SUBCUTANEOUS EVERY MORNING
Refills: 0 | Status: DISCONTINUED | OUTPATIENT
Start: 2020-05-06 | End: 2020-05-18

## 2020-05-06 RX ORDER — MIDAZOLAM HYDROCHLORIDE 1 MG/ML
2 INJECTION, SOLUTION INTRAMUSCULAR; INTRAVENOUS ONCE
Refills: 0 | Status: DISCONTINUED | OUTPATIENT
Start: 2020-05-06 | End: 2020-05-06

## 2020-05-06 RX ORDER — ACETAMINOPHEN 500 MG
650 TABLET ORAL EVERY 6 HOURS
Refills: 0 | Status: DISCONTINUED | OUTPATIENT
Start: 2020-05-06 | End: 2020-05-18

## 2020-05-06 RX ORDER — INSULIN LISPRO 100/ML
VIAL (ML) SUBCUTANEOUS EVERY 6 HOURS
Refills: 0 | Status: DISCONTINUED | OUTPATIENT
Start: 2020-05-06 | End: 2020-05-18

## 2020-05-06 RX ADMIN — Medication 500 MILLIGRAM(S): at 13:08

## 2020-05-06 RX ADMIN — FENTANYL CITRATE 1 PATCH: 50 INJECTION INTRAVENOUS at 17:21

## 2020-05-06 RX ADMIN — MONTELUKAST 10 MILLIGRAM(S): 4 TABLET, CHEWABLE ORAL at 13:09

## 2020-05-06 RX ADMIN — Medication 3: at 05:49

## 2020-05-06 RX ADMIN — Medication 1 DROP(S): at 05:08

## 2020-05-06 RX ADMIN — FENTANYL CITRATE 1 PATCH: 50 INJECTION INTRAVENOUS at 05:09

## 2020-05-06 RX ADMIN — ENOXAPARIN SODIUM 40 MILLIGRAM(S): 100 INJECTION SUBCUTANEOUS at 18:18

## 2020-05-06 RX ADMIN — Medication 1 DROP(S): at 21:32

## 2020-05-06 RX ADMIN — CHLORHEXIDINE GLUCONATE 15 MILLILITER(S): 213 SOLUTION TOPICAL at 18:18

## 2020-05-06 RX ADMIN — MIDAZOLAM HYDROCHLORIDE 2 MILLIGRAM(S): 1 INJECTION, SOLUTION INTRAMUSCULAR; INTRAVENOUS at 13:40

## 2020-05-06 RX ADMIN — MIDODRINE HYDROCHLORIDE 10 MILLIGRAM(S): 2.5 TABLET ORAL at 14:23

## 2020-05-06 RX ADMIN — ENOXAPARIN SODIUM 40 MILLIGRAM(S): 100 INJECTION SUBCUTANEOUS at 05:08

## 2020-05-06 RX ADMIN — INSULIN GLARGINE 10 UNIT(S): 100 INJECTION, SOLUTION SUBCUTANEOUS at 09:03

## 2020-05-06 RX ADMIN — MIDODRINE HYDROCHLORIDE 10 MILLIGRAM(S): 2.5 TABLET ORAL at 21:32

## 2020-05-06 RX ADMIN — PANTOPRAZOLE SODIUM 40 MILLIGRAM(S): 20 TABLET, DELAYED RELEASE ORAL at 13:09

## 2020-05-06 RX ADMIN — CHLORHEXIDINE GLUCONATE 1 APPLICATION(S): 213 SOLUTION TOPICAL at 05:09

## 2020-05-06 RX ADMIN — Medication 4: at 18:18

## 2020-05-06 RX ADMIN — MORPHINE SULFATE 2 MILLIGRAM(S): 50 CAPSULE, EXTENDED RELEASE ORAL at 13:23

## 2020-05-06 RX ADMIN — Medication 1 DROP(S): at 13:08

## 2020-05-06 RX ADMIN — CHLORHEXIDINE GLUCONATE 15 MILLILITER(S): 213 SOLUTION TOPICAL at 05:08

## 2020-05-06 RX ADMIN — MIDAZOLAM HYDROCHLORIDE 2 MILLIGRAM(S): 1 INJECTION, SOLUTION INTRAMUSCULAR; INTRAVENOUS at 20:55

## 2020-05-06 RX ADMIN — MIDODRINE HYDROCHLORIDE 10 MILLIGRAM(S): 2.5 TABLET ORAL at 05:08

## 2020-05-06 RX ADMIN — Medication 2: at 22:09

## 2020-05-06 RX ADMIN — Medication 1 TABLET(S): at 13:08

## 2020-05-06 RX ADMIN — INSULIN GLARGINE 10 UNIT(S): 100 INJECTION, SOLUTION SUBCUTANEOUS at 22:09

## 2020-05-06 RX ADMIN — Medication 650 MILLIGRAM(S): at 13:40

## 2020-05-06 RX ADMIN — POLYETHYLENE GLYCOL 3350 17 GRAM(S): 17 POWDER, FOR SOLUTION ORAL at 13:09

## 2020-05-06 RX ADMIN — Medication 4: at 13:29

## 2020-05-06 RX ADMIN — DEXMEDETOMIDINE HYDROCHLORIDE IN 0.9% SODIUM CHLORIDE 5.86 MICROGRAM(S)/KG/HR: 4 INJECTION INTRAVENOUS at 04:30

## 2020-05-06 NOTE — CHART NOTE - NSCHARTNOTEFT_GEN_A_CORE
4F with PMHx of frontal lobe dementia, stroke, functional quadriplegia, tracheostomy (on TC at facility), dysphagia s/p PEG who resides in NH presents with fever and worsening hypoxemia. + COVID at NH 4/27. Trach changed to cuff and pt placed on full vent support. Transferred to ICU.  Pt currenlty on ventilator, she is not requiring any drips.  She opens her eyes but not following commands.  She is contracted and bedbound.  She is hemodynamically stable and will be downgraded to chronic vent unit.  Signout endorsed to Dr Guerrero.  Patient is a 74y old  Female who presents with a chief complaint of acute respiratory failure (05 May 2020 10:40)      BRIEF HOSPITAL COURSE: 74y Female  as above    Events last 24 hours: resp failure requiring vent via trach.    PAST MEDICAL & SURGICAL HISTORY:  Quadriplegia  COVID-19 virus detected  Advanced dementia  DM (diabetes mellitus)  HTN (hypertension)  CVA (cerebral vascular accident)  Respiratory failure  Constipation  GERD (gastroesophageal reflux disease)  Hypertension  Respiratory failure  Diabetes mellitus  Aphasic stroke  Dementia of frontal lobe type  Feeding by G-tube  Tracheostomy tube present  Tracheostomy in place  Gastrostomy in place  Hx of appendectomy        Hosp day #2d    Vent day #2 Mode: AC/ CMV (Assist Control/ Continuous Mandatory Ventilation), RR (machine): 14, TV (machine): 380, FiO2: 100, PEEP: 6, ITime: 0.6, MAP: 13, PIP: 32  Mode: AC/ CMV (Assist Control/ Continuous Mandatory Ventilation)  RR (machine): 14  TV (machine): 380  FiO2: 50  PEEP: 6  ITime: 0.6  MAP: 13  PIP: 32        Vital signs / Reviewed and Physical Exam Performed where pertinent and urgently required  opens eyes/smiles.  Not following command.  Contracted  PERRL  LS course diffusely b/l  CV +s1s2, RRR   ABD +BS soft NT/ND, PEG in place  EXT no c/c/e, contracted all 4 extremeties      T(C): 38.5 (05-06-20 @ 13:35), Max: 38.5 (05-06-20 @ 13:35)  HR: 91 (05-06-20 @ 14:44) (57 - 135)  BP: 96/54 (05-06-20 @ 14:27) (96/54 - 166/68)  RR: 22 (05-06-20 @ 14:27) (18 - 33)  SpO2: 98% (05-06-20 @ 14:44) (93% - 100%)  05-05-20 @ 07:01  -  05-06-20 @ 07:00  --------------------------------------------------------  IN: 752.9 mL / OUT: 710 mL / NET: 42.9 mL      Lab / Radiology  studies / ABG / Meds -  reviewed and interpreted into the assessment and treatment plan.  acetaminophen    Suspension .. 650 milliGRAM(s) Oral every 6 hours PRN  artificial  tears Solution 1 Drop(s) Both EYES three times a day  ascorbic acid 500 milliGRAM(s) Oral daily  calcium carbonate 1250 mG  + Vitamin D (OsCal 500 + D) 1 Tablet(s) Oral daily  chlorhexidine 0.12% Liquid 15 milliLiter(s) Oral Mucosa every 12 hours  chlorhexidine 4% Liquid 1 Application(s) Topical <User Schedule>  dextrose 40% Gel 15 Gram(s) Oral once PRN  dextrose 5%. 1000 milliLiter(s) IV Continuous <Continuous>  dextrose 50% Injectable 12.5 Gram(s) IV Push once  dextrose 50% Injectable 25 Gram(s) IV Push once  dextrose 50% Injectable 25 Gram(s) IV Push once  enoxaparin Injectable 40 milliGRAM(s) SubCutaneous every 12 hours  fentaNYL   Patch  12 MICROgram(s)/Hr 1 Patch Transdermal every 72 hours  glucagon  Injectable 1 milliGRAM(s) IntraMuscular once PRN  insulin glargine Injectable (LANTUS) 10 Unit(s) SubCutaneous every morning  insulin glargine Injectable (LANTUS) 10 Unit(s) SubCutaneous at bedtime  insulin lispro (HumaLOG) corrective regimen sliding scale   SubCutaneous every 6 hours  midodrine 10 milliGRAM(s) Oral every 8 hours  montelukast 10 milliGRAM(s) Oral daily  morphine  - Injectable 2 milliGRAM(s) IV Push every 4 hours PRN  pantoprazole   Suspension 40 milliGRAM(s) Oral daily  polyethylene glycol 3350 17 Gram(s) Oral daily      Assessment/Plan/Therapeutic interventions  Problems:    - full note to follow 4F with PMHx of frontal lobe dementia, stroke, functional quadriplegia, tracheostomy (on TC at facility), dysphagia s/p PEG who resides in NH presents with fever and worsening hypoxemia. + COVID at NH 4/27. Trach changed to cuff and pt placed on full vent support. Transferred to ICU.  Pt currenlty on ventilator, she is not requiring any drips.  She opens her eyes but not following commands.  She is contracted and bedbound.  She is hemodynamically stable and will be downgraded to chronic vent unit.  Signout endorsed to Dr Guerrero.  Patient is a 74y old  Female who presents with a chief complaint of acute respiratory failure (05 May 2020 10:40)      BRIEF HOSPITAL COURSE: 74y Female  as above    Events last 24 hours: resp failure requiring vent via trach.    PAST MEDICAL & SURGICAL HISTORY:  Quadriplegia  COVID-19 virus detected  Advanced dementia  DM (diabetes mellitus)  HTN (hypertension)  CVA (cerebral vascular accident)  Respiratory failure  Constipation  GERD (gastroesophageal reflux disease)  Hypertension  Respiratory failure  Diabetes mellitus  Aphasic stroke  Dementia of frontal lobe type  Feeding by G-tube  Tracheostomy tube present  Tracheostomy in place  Gastrostomy in place  Hx of appendectomy        Hosp day #2d    Vent day #2 Mode: AC/ CMV (Assist Control/ Continuous Mandatory Ventilation), RR (machine): 14, TV (machine): 380, FiO2: 100, PEEP: 6, ITime: 0.6, MAP: 13, PIP: 32  Mode: AC/ CMV (Assist Control/ Continuous Mandatory Ventilation)  RR (machine): 14  TV (machine): 380  FiO2: 50  PEEP: 6  ITime: 0.6  MAP: 13  PIP: 32        Vital signs / Reviewed and Physical Exam Performed where pertinent and urgently required  opens eyes/smiles.  Not following command.  Contracted  PERRL  LS course diffusely b/l  CV +s1s2, RRR   ABD +BS soft NT/ND, PEG in place  EXT no c/c/e, contracted all 4 extremeties      T(C): 38.5 (05-06-20 @ 13:35), Max: 38.5 (05-06-20 @ 13:35)  HR: 91 (05-06-20 @ 14:44) (57 - 135)  BP: 96/54 (05-06-20 @ 14:27) (96/54 - 166/68)  RR: 22 (05-06-20 @ 14:27) (18 - 33)  SpO2: 98% (05-06-20 @ 14:44) (93% - 100%)  05-05-20 @ 07:01  -  05-06-20 @ 07:00  --------------------------------------------------------  IN: 752.9 mL / OUT: 710 mL / NET: 42.9 mL      Lab / Radiology  studies / ABG / Meds -  reviewed and interpreted into the assessment and treatment plan.  acetaminophen    Suspension .. 650 milliGRAM(s) Oral every 6 hours PRN  artificial  tears Solution 1 Drop(s) Both EYES three times a day  ascorbic acid 500 milliGRAM(s) Oral daily  calcium carbonate 1250 mG  + Vitamin D (OsCal 500 + D) 1 Tablet(s) Oral daily  chlorhexidine 0.12% Liquid 15 milliLiter(s) Oral Mucosa every 12 hours  chlorhexidine 4% Liquid 1 Application(s) Topical <User Schedule>  dextrose 40% Gel 15 Gram(s) Oral once PRN  dextrose 5%. 1000 milliLiter(s) IV Continuous <Continuous>  dextrose 50% Injectable 12.5 Gram(s) IV Push once  dextrose 50% Injectable 25 Gram(s) IV Push once  dextrose 50% Injectable 25 Gram(s) IV Push once  enoxaparin Injectable 40 milliGRAM(s) SubCutaneous every 12 hours  fentaNYL   Patch  12 MICROgram(s)/Hr 1 Patch Transdermal every 72 hours  glucagon  Injectable 1 milliGRAM(s) IntraMuscular once PRN  insulin glargine Injectable (LANTUS) 10 Unit(s) SubCutaneous every morning  insulin glargine Injectable (LANTUS) 10 Unit(s) SubCutaneous at bedtime  insulin lispro (HumaLOG) corrective regimen sliding scale   SubCutaneous every 6 hours  midodrine 10 milliGRAM(s) Oral every 8 hours  montelukast 10 milliGRAM(s) Oral daily  morphine  - Injectable 2 milliGRAM(s) IV Push every 4 hours PRN  pantoprazole   Suspension 40 milliGRAM(s) Oral daily  polyethylene glycol 3350 17 Gram(s) Oral daily      Assessment/Plan/Therapeutic interventions  Problems:    - full note to follow    ---------------------------------------------------------------------------  ATTENDING ATTESTATION:  Patient seen and examined at the bedside. I was physically present for key portions of the evaluation and management services provided. Agree with the above history, physical, assessment, and plan with the necessary amendments/elaborations below:    clinically stable. acute on chronic resp failure sec to covid19 pna. on minimal vent support. trach changed during admit. tolerating well. will need fu w/ sw to assess if patient can return to nh on minimal vent support or if she needs to be weaned fully back to trach collar prior to transfer. otherwise patient w/ guarded long term prognosis given multiple active medical problems and severe debility. baseline nonverbal, has advanced dementia, does not follow commands, no blink to threat reflex, trach and peg pre-covid, bedbound. palliative cs pending in interim.

## 2020-05-06 NOTE — DIETITIAN INITIAL EVALUATION ADULT. - PERTINENT MEDS FT
MEDICATIONS  (STANDING):  artificial  tears Solution 1 Drop(s) Both EYES three times a day  ascorbic acid 500 milliGRAM(s) Oral daily  calcium carbonate 1250 mG  + Vitamin D (OsCal 500 + D) 1 Tablet(s) Oral daily  chlorhexidine 0.12% Liquid 15 milliLiter(s) Oral Mucosa every 12 hours  chlorhexidine 4% Liquid 1 Application(s) Topical <User Schedule>  dextrose 5%. 1000 milliLiter(s) (50 mL/Hr) IV Continuous <Continuous>  dextrose 50% Injectable 12.5 Gram(s) IV Push once  dextrose 50% Injectable 25 Gram(s) IV Push once  dextrose 50% Injectable 25 Gram(s) IV Push once  enoxaparin Injectable 40 milliGRAM(s) SubCutaneous every 12 hours  fentaNYL   Patch  12 MICROgram(s)/Hr 1 Patch Transdermal every 72 hours  insulin glargine Injectable (LANTUS) 10 Unit(s) SubCutaneous every morning  insulin glargine Injectable (LANTUS) 10 Unit(s) SubCutaneous at bedtime  insulin lispro (HumaLOG) corrective regimen sliding scale   SubCutaneous every 6 hours  midodrine 10 milliGRAM(s) Oral every 8 hours  montelukast 10 milliGRAM(s) Oral daily  pantoprazole   Suspension 40 milliGRAM(s) Oral daily  polyethylene glycol 3350 17 Gram(s) Oral daily    MEDICATIONS  (PRN):  acetaminophen    Suspension .. 650 milliGRAM(s) Oral every 6 hours PRN Temp greater or equal to 38C (100.4F)  dextrose 40% Gel 15 Gram(s) Oral once PRN Blood Glucose LESS THAN 70 milliGRAM(s)/deciliter  glucagon  Injectable 1 milliGRAM(s) IntraMuscular once PRN Glucose LESS THAN 70 milligrams/deciliter  morphine  - Injectable 2 milliGRAM(s) IV Push every 4 hours PRN Severe Pain (7 - 10)

## 2020-05-06 NOTE — DIETITIAN INITIAL EVALUATION ADULT. - OTHER INFO
74F with PMHx of frontal lobe dementia, stroke, functional quadriplegia, tracheostomy (on TC at facility), dysphagia s/p PEG who resides in NH presents with fever and worsening hypoxemia. + COVID at NH 4/27. Trach changed to cuff and pt placed on full vent support. Transferred to ICU.  Pt currenlty on ventilator, she is not requiring any drips.  She opens her eyes but not following commands.  She is contracted and bedbound.  She is hemodynamically stable and will be downgraded to chronic vent unit.    Diet, NPO with Tube Feed:   Tube Feeding Modality: Gastrostomy  Glucerna 1.5 Horacio  Total Volume for 24 Hours (mL): 720  Continuous  Starting Tube Feed Rate {mL per Hour}: 10  Increase Tube Feed Rate by (mL): 10     Every 2 hours  Until Goal Tube Feed Rate (mL per Hour): 30  Tube Feed Duration (in Hours): 24  Tube Feed Start Time: 19:00 (05-05-20 @ 04:47)  30 x20 hrs = 600ml providing 900 kcal, 50 g pro, 456 ml free water  no skin breakdown documented   no edema documented  weight 125 lbs in 1/2019 per previous admission note  current weight 129 lbs

## 2020-05-06 NOTE — DIETITIAN INITIAL EVALUATION ADULT. - ENTERAL
increase Glucerna 1.5 by 10 ml q 8 hrs to goal of 50 ml /hr ( x 20 hrs) to provide Glucerna 1.5  1000 ml = 1500 kcal, 83 g pro and 760 ml free water

## 2020-05-06 NOTE — PROGRESS NOTE ADULT - SUBJECTIVE AND OBJECTIVE BOX
74F with PMHx of frontal lobe dementia, stroke, functional quadriplegia, tracheostomy (on TC at facility), dysphagia s/p PEG who resides in NH presents with fever and worsening hypoxemia. + COVID at NH 4/27. Trach changed to cuff and pt placed on full vent support. Transferred to ICU.  Pt currenlty on ventilator, she is not requiring any drips.  She opens her eyes but not following commands.  She is contracted and bedbound.  She is hemodynamically stable and will be downgraded to chronic vent unit.  Signout endorsed to Dr Guerrero.  Patient is a 74y old  Female who presents with a chief complaint of acute respiratory failure (05 May 2020 10:40)      BRIEF HOSPITAL COURSE: 74y Female  as above    Events last 24 hours: resp failure requiring vent via trach.    PAST MEDICAL & SURGICAL HISTORY:  Quadriplegia  COVID-19 virus detected  Advanced dementia  DM (diabetes mellitus)  HTN (hypertension)  CVA (cerebral vascular accident)  Respiratory failure  Constipation  GERD (gastroesophageal reflux disease)  Hypertension  Respiratory failure  Diabetes mellitus  Aphasic stroke  Dementia of frontal lobe type  Feeding by G-tube  Tracheostomy tube present  Tracheostomy in place  Gastrostomy in place  Hx of appendectomy        Hosp day #2d    Vent day #2 Mode: AC/ CMV (Assist Control/ Continuous Mandatory Ventilation), RR (machine): 14, TV (machine): 380, FiO2: 100, PEEP: 6, ITime: 0.6, MAP: 13, PIP: 32  Mode: AC/ CMV (Assist Control/ Continuous Mandatory Ventilation)  RR (machine): 14  TV (machine): 380  FiO2: 50  PEEP: 6  ITime: 0.6  MAP: 13  PIP: 32        Vital signs / Reviewed and Physical Exam Performed where pertinent and urgently required  opens eyes/smiles.  Not following command.  Contracted  PERRL  LS course diffusely b/l  CV +s1s2, RRR   ABD +BS soft NT/ND, PEG in place  EXT no c/c/e, contracted all 4 extremeties      T(C): 38.5 (05-06-20 @ 13:35), Max: 38.5 (05-06-20 @ 13:35)  HR: 91 (05-06-20 @ 14:44) (57 - 135)  BP: 96/54 (05-06-20 @ 14:27) (96/54 - 166/68)  RR: 22 (05-06-20 @ 14:27) (18 - 33)  SpO2: 98% (05-06-20 @ 14:44) (93% - 100%)  05-05-20 @ 07:01  -  05-06-20 @ 07:00  --------------------------------------------------------  IN: 752.9 mL / OUT: 710 mL / NET: 42.9 mL      Lab / Radiology  studies / ABG / Meds -  reviewed and interpreted into the assessment and treatment plan.  acetaminophen    Suspension .. 650 milliGRAM(s) Oral every 6 hours PRN  artificial  tears Solution 1 Drop(s) Both EYES three times a day  ascorbic acid 500 milliGRAM(s) Oral daily  calcium carbonate 1250 mG  + Vitamin D (OsCal 500 + D) 1 Tablet(s) Oral daily  chlorhexidine 0.12% Liquid 15 milliLiter(s) Oral Mucosa every 12 hours  chlorhexidine 4% Liquid 1 Application(s) Topical <User Schedule>  dextrose 40% Gel 15 Gram(s) Oral once PRN  dextrose 5%. 1000 milliLiter(s) IV Continuous <Continuous>  dextrose 50% Injectable 12.5 Gram(s) IV Push once  dextrose 50% Injectable 25 Gram(s) IV Push once  dextrose 50% Injectable 25 Gram(s) IV Push once  enoxaparin Injectable 40 milliGRAM(s) SubCutaneous every 12 hours  fentaNYL   Patch  12 MICROgram(s)/Hr 1 Patch Transdermal every 72 hours  glucagon  Injectable 1 milliGRAM(s) IntraMuscular once PRN  insulin glargine Injectable (LANTUS) 10 Unit(s) SubCutaneous every morning  insulin glargine Injectable (LANTUS) 10 Unit(s) SubCutaneous at bedtime  insulin lispro (HumaLOG) corrective regimen sliding scale   SubCutaneous every 6 hours  midodrine 10 milliGRAM(s) Oral every 8 hours  montelukast 10 milliGRAM(s) Oral daily  morphine  - Injectable 2 milliGRAM(s) IV Push every 4 hours PRN  pantoprazole   Suspension 40 milliGRAM(s) Oral daily  polyethylene glycol 3350 17 Gram(s) Oral daily      Assessment/Plan/Therapeutic interventions  Problems:  1) Acute Hypoxic respiratory failure related to COVID-19 infection, Viral PNA with associatated ARDS      Neuro - Sedation neuromuscular blockade to facilitate safe ventilation/ Current Sedation strategy includes PRN pain medicaiton    CV -  Pressor support as needed to maintain MAP 65           Avoiding fluid challenges          QTC monitoring while on Azithromycin and Hydroxychloroquine.    Pulm -  ARDS-NET 4-6cc/kg IBW TV as able to maintain plateau pressures <30               Prone ventilation consideration as feasible  Pa02/Fi02 < 150 on Fi02 >60% and PEEP at least 5                Current FIO2/PEEP requirement is 50/6                GI -  PPI  Enteric feeds as tolerated in tandem with NMB and prone ventilation    Renal - Even to negative fluid balance as tolerated by hemodynamics and renal fx.  Feeds to be provided in lieu of IVF.                **** Currently Off Diuretics    Heme -  Pharmacologic DVT PPx  in addition to SCD's, HIgher dose of Lovenox being administered given prothrombotic nature of COVID infection    ID - ABX discontinuation based on discussion with ID in conjunction with clinical features, culture data, and judicious procalcitonin monitoring.  Currently not  requiring antibiotics    Endo -  Aggressive glycemic control to limit FS glucose to < 180mg/dl         COVID 19 specific considerations and therapeutic  options based on the available and rapidly changing literature    Goals of care considerations:  Ongoing assessment for patient specific treatment options based on progression or decline.  I have involved the family with updates and requests in guidance for medical decision making.          42  Minutes of critical care time spent in the management of this critically ill COVID-19 patient/PUI patient with continuous assessments and interventions based on the interpretation of multiple databases.

## 2020-05-06 NOTE — CHART NOTE - NSCHARTNOTEFT_GEN_A_CORE
Spoke with patients daughter Sunni Daily, [phone], who had multiple questions about her mothers current status and care. Informed her she was ready to be downgraded from the ICU and that someone will call her daily to give her an update about her condition. All questions and concerns were addressed.

## 2020-05-06 NOTE — DIETITIAN INITIAL EVALUATION ADULT. - PERTINENT LABORATORY DATA
05-06 Na140 mmol/L Glu 239 mg/dL<H> K+ 5.8 mmol/L<H> Cr  0.88 mg/dL BUN 37.0 mg/dL<H> Phos n/a   Alb 2.4 g/dL<L> PAB n/a

## 2020-05-07 LAB
-  AMIKACIN: SIGNIFICANT CHANGE UP
-  AZTREONAM: SIGNIFICANT CHANGE UP
-  CEFEPIME: SIGNIFICANT CHANGE UP
-  CEFTAZIDIME: SIGNIFICANT CHANGE UP
-  CIPROFLOXACIN: SIGNIFICANT CHANGE UP
-  GENTAMICIN: SIGNIFICANT CHANGE UP
-  IMIPENEM: SIGNIFICANT CHANGE UP
-  LEVOFLOXACIN: SIGNIFICANT CHANGE UP
-  MEROPENEM: SIGNIFICANT CHANGE UP
-  PIPERACILLIN/TAZOBACTAM: SIGNIFICANT CHANGE UP
-  TOBRAMYCIN: SIGNIFICANT CHANGE UP
ANION GAP SERPL CALC-SCNC: 13 MMOL/L — SIGNIFICANT CHANGE UP (ref 5–17)
BUN SERPL-MCNC: 39 MG/DL — HIGH (ref 8–20)
CALCIUM SERPL-MCNC: 8.4 MG/DL — LOW (ref 8.6–10.2)
CHLORIDE SERPL-SCNC: 104 MMOL/L — SIGNIFICANT CHANGE UP (ref 98–107)
CO2 SERPL-SCNC: 26 MMOL/L — SIGNIFICANT CHANGE UP (ref 22–29)
CREAT SERPL-MCNC: 0.97 MG/DL — SIGNIFICANT CHANGE UP (ref 0.5–1.3)
CULTURE RESULTS: SIGNIFICANT CHANGE UP
GLUCOSE BLDC GLUCOMTR-MCNC: 129 MG/DL — HIGH (ref 70–99)
GLUCOSE BLDC GLUCOMTR-MCNC: 201 MG/DL — HIGH (ref 70–99)
GLUCOSE BLDC GLUCOMTR-MCNC: 209 MG/DL — HIGH (ref 70–99)
GLUCOSE BLDC GLUCOMTR-MCNC: 235 MG/DL — HIGH (ref 70–99)
GLUCOSE BLDC GLUCOMTR-MCNC: 267 MG/DL — HIGH (ref 70–99)
GLUCOSE SERPL-MCNC: 156 MG/DL — HIGH (ref 70–99)
HCT VFR BLD CALC: 32.1 % — LOW (ref 34.5–45)
HGB BLD-MCNC: 9.7 G/DL — LOW (ref 11.5–15.5)
MAGNESIUM SERPL-MCNC: 3.1 MG/DL — HIGH (ref 1.6–2.6)
MCHC RBC-ENTMCNC: 27.5 PG — SIGNIFICANT CHANGE UP (ref 27–34)
MCHC RBC-ENTMCNC: 30.2 GM/DL — LOW (ref 32–36)
MCV RBC AUTO: 90.9 FL — SIGNIFICANT CHANGE UP (ref 80–100)
METHOD TYPE: SIGNIFICANT CHANGE UP
ORGANISM # SPEC MICROSCOPIC CNT: SIGNIFICANT CHANGE UP
ORGANISM # SPEC MICROSCOPIC CNT: SIGNIFICANT CHANGE UP
PHOSPHATE SERPL-MCNC: 2.3 MG/DL — LOW (ref 2.4–4.7)
PLATELET # BLD AUTO: 274 K/UL — SIGNIFICANT CHANGE UP (ref 150–400)
POTASSIUM SERPL-MCNC: 4.9 MMOL/L — SIGNIFICANT CHANGE UP (ref 3.5–5.3)
POTASSIUM SERPL-SCNC: 4.9 MMOL/L — SIGNIFICANT CHANGE UP (ref 3.5–5.3)
RBC # BLD: 3.53 M/UL — LOW (ref 3.8–5.2)
RBC # FLD: 14.8 % — HIGH (ref 10.3–14.5)
SODIUM SERPL-SCNC: 143 MMOL/L — SIGNIFICANT CHANGE UP (ref 135–145)
SPECIMEN SOURCE: SIGNIFICANT CHANGE UP
WBC # BLD: 10.09 K/UL — SIGNIFICANT CHANGE UP (ref 3.8–10.5)
WBC # FLD AUTO: 10.09 K/UL — SIGNIFICANT CHANGE UP (ref 3.8–10.5)

## 2020-05-07 PROCEDURE — 99233 SBSQ HOSP IP/OBS HIGH 50: CPT | Mod: CS

## 2020-05-07 RX ORDER — MIDODRINE HYDROCHLORIDE 2.5 MG/1
10 TABLET ORAL EVERY 8 HOURS
Refills: 0 | Status: DISCONTINUED | OUTPATIENT
Start: 2020-05-07 | End: 2020-05-13

## 2020-05-07 RX ORDER — ENOXAPARIN SODIUM 100 MG/ML
60 INJECTION SUBCUTANEOUS EVERY 12 HOURS
Refills: 0 | Status: DISCONTINUED | OUTPATIENT
Start: 2020-05-07 | End: 2020-05-12

## 2020-05-07 RX ORDER — FERROUS SULFATE 325(65) MG
300 TABLET ORAL DAILY
Refills: 0 | Status: DISCONTINUED | OUTPATIENT
Start: 2020-05-07 | End: 2020-05-18

## 2020-05-07 RX ADMIN — Medication 1 TABLET(S): at 11:29

## 2020-05-07 RX ADMIN — MONTELUKAST 10 MILLIGRAM(S): 4 TABLET, CHEWABLE ORAL at 11:29

## 2020-05-07 RX ADMIN — MIDODRINE HYDROCHLORIDE 10 MILLIGRAM(S): 2.5 TABLET ORAL at 04:42

## 2020-05-07 RX ADMIN — Medication 1 DROP(S): at 04:42

## 2020-05-07 RX ADMIN — Medication 1 DROP(S): at 13:02

## 2020-05-07 RX ADMIN — PANTOPRAZOLE SODIUM 40 MILLIGRAM(S): 20 TABLET, DELAYED RELEASE ORAL at 11:29

## 2020-05-07 RX ADMIN — Medication 300 MILLIGRAM(S): at 18:24

## 2020-05-07 RX ADMIN — FENTANYL CITRATE 1 PATCH: 50 INJECTION INTRAVENOUS at 20:59

## 2020-05-07 RX ADMIN — Medication 4: at 23:40

## 2020-05-07 RX ADMIN — POLYETHYLENE GLYCOL 3350 17 GRAM(S): 17 POWDER, FOR SOLUTION ORAL at 11:27

## 2020-05-07 RX ADMIN — Medication 500 MILLIGRAM(S): at 11:31

## 2020-05-07 RX ADMIN — Medication 1 MILLIGRAM(S): at 16:07

## 2020-05-07 RX ADMIN — FENTANYL CITRATE 1 PATCH: 50 INJECTION INTRAVENOUS at 18:03

## 2020-05-07 RX ADMIN — MORPHINE SULFATE 2 MILLIGRAM(S): 50 CAPSULE, EXTENDED RELEASE ORAL at 10:36

## 2020-05-07 RX ADMIN — Medication 1 DROP(S): at 20:56

## 2020-05-07 RX ADMIN — FENTANYL CITRATE 1 PATCH: 50 INJECTION INTRAVENOUS at 08:51

## 2020-05-07 RX ADMIN — ENOXAPARIN SODIUM 40 MILLIGRAM(S): 100 INJECTION SUBCUTANEOUS at 04:42

## 2020-05-07 RX ADMIN — Medication 6: at 18:42

## 2020-05-07 RX ADMIN — CHLORHEXIDINE GLUCONATE 15 MILLILITER(S): 213 SOLUTION TOPICAL at 18:23

## 2020-05-07 RX ADMIN — Medication 4: at 13:01

## 2020-05-07 RX ADMIN — CHLORHEXIDINE GLUCONATE 1 APPLICATION(S): 213 SOLUTION TOPICAL at 04:42

## 2020-05-07 RX ADMIN — Medication 60 MILLIGRAM(S): at 13:02

## 2020-05-07 RX ADMIN — MORPHINE SULFATE 2 MILLIGRAM(S): 50 CAPSULE, EXTENDED RELEASE ORAL at 15:34

## 2020-05-07 RX ADMIN — INSULIN GLARGINE 10 UNIT(S): 100 INJECTION, SOLUTION SUBCUTANEOUS at 08:56

## 2020-05-07 RX ADMIN — ENOXAPARIN SODIUM 60 MILLIGRAM(S): 100 INJECTION SUBCUTANEOUS at 18:24

## 2020-05-07 RX ADMIN — CHLORHEXIDINE GLUCONATE 15 MILLILITER(S): 213 SOLUTION TOPICAL at 04:42

## 2020-05-07 NOTE — RAPID RESPONSE TEAM SUMMARY - NSADDTLFINDINGSRRT_GEN_ALL_CORE
Vital Signs Last 24 Hrs  T(C): 37.3 (07 May 2020 16:05), Max: 37.3 (07 May 2020 16:05)  T(F): 99.1 (07 May 2020 16:05), Max: 99.1 (07 May 2020 16:05)  HR: 134 (07 May 2020 15:53) (72 - 134)  BP: 176/84 (07 May 2020 15:53) (96/62 - 176/84)  BP(mean): 65 (07 May 2020 00:28) (65 - 72)  RR: 30 (07 May 2020 15:53) (20 - 34)  SpO2: 91% (07 May 2020 15:53) (91% - 99%)    PHYSICAL EXAM:    GENERAL: moderate respiratory distress  HEAD:  Atraumatic, Normocephalic  NECK: Supple, No JVD, Normal thyroid, Trach to vent  NERVOUS SYSTEM:  Lethargic, generalized weakness  CHEST/LUNG: Decreased BS bilaterally  HEART: Regular rate and rhythm; No murmurs, rubs, or gallops  ABDOMEN: Soft, Nontender, Nondistended; Bowel sounds present; (+) peg, (+) vaughn  EXTREMITIES:  2+ Peripheral Pulses, No clubbing, cyanosis, or edema                              9.7    10.09 )-----------( 274      ( 07 May 2020 09:25 )             32.1     07 May 2020 09:22    143    |  104    |  39.0   ----------------------------<  156    4.9     |  26.0   |  0.97     Ca    8.4        07 May 2020 09:22  Phos  2.3       07 May 2020 09:22  Mg     3.1       07 May 2020 09:22    TPro  6.3    /  Alb  2.4    /  TBili  0.5    /  DBili  x      /  AST  25     /  ALT  21     /  AlkPhos  79     06 May 2020 06:11      LIVER FUNCTIONS - ( 06 May 2020 06:11 )  Alb: 2.4 g/dL / Pro: 6.3 g/dL / ALK PHOS: 79 U/L / ALT: 21 U/L / AST: 25 U/L / GGT: x

## 2020-05-07 NOTE — CHART NOTE - NSCHARTNOTEFT_GEN_A_CORE
pt with increased wob, tachypneic and with increased FiO2 saturating well but also elevated blood pressure  starting full dose Lovenox for suspected PE and CTA chest ordered  D dimer in AM  no relief with Morphine  Ativan x 1 dose- if effective will then cont PRN

## 2020-05-07 NOTE — RAPID RESPONSE TEAM SUMMARY - NSSITUATIONBACKGROUNDRRT_GEN_ALL_CORE
75y/o female PMHx of frontal lobe dementia, stroke, functional quadriplegia, tracheostomy (on TC at facility), dysphagia s/p PEG who resides in NH presents with fever and worsening hypoxemia. + COVID at NH 4/27. Patient developing increased work of breathing intermittently today and O2 increased to 100% on vent.  CTA chest ordered to r/o PE.  While prepping for transport, patient became grey and worsening mentation.  Patient became hypoxic in 70s.  Rapid response called for hypoxia.

## 2020-05-07 NOTE — CHART NOTE - NSCHARTNOTEFT_GEN_A_CORE
Spoke to patient's  (Marciano Daily) at length,  is concerned about patient's bowel regimen.  He states that the patient gets fleet enemas Monday, Wednesday, and Friday at NH.    He states the patient has 4 bowel movements a day and this will cause "straining" and elevation of her HR.   I explained that the patient currently does not have constipation and the patient's HR was currently stable at the time of the call.   Please consider PRN fleet enemas if constipation occurs.   Will continue to monitor.

## 2020-05-07 NOTE — PROGRESS NOTE ADULT - SUBJECTIVE AND OBJECTIVE BOX
HEALTH ISSUES - PROBLEM Dx:  PMHx of frontal lobe dementia, stroke, functional quadriplegia, tracheostomy (on TC at facility), dysphagia s/p PEG who resides in NH presents with fever and worsening hypoxemia. + COVID at NH 4/27.    Vent dependent Trach, hypoxia    INTERVAL HPI/ OVERNIGHT EVENTS:    pt lying in bed comfortable on vent via trach  no PEEP   AC/ 14/ 350/ 70%/ 0    REVIEW OF SYSTEMS:    as above    Vital Signs Last 24 Hrs  T(C): 36.8 (07 May 2020 08:56), Max: 38.5 (06 May 2020 13:35)  T(F): 98.3 (07 May 2020 08:56), Max: 101.3 (06 May 2020 13:35)  HR: 128 (07 May 2020 10:37) (71 - 135)  BP: 123/65 (07 May 2020 08:56) (96/54 - 166/68)  BP(mean): 65 (07 May 2020 00:28) (64 - 76)  RR: 34 (07 May 2020 10:37) (20 - 34)  SpO2: 91% (07 May 2020 10:37) (91% - 100%)    PHYSICAL EXAM-  GENERAL: demented, no real response, comfortable  HEAD:  Atraumatic, Normocephalic  EYES: conjunctiva and sclera clear  ENT: Moist mucous membranes, Trach +  NECK: No JVD  NERVOUS SYSTEM:  demented, contracted,   CHEST/LUNG: conducted breath sounds; No rales, rhonchi, wheezing, or rubs  HEART: Regular rate and rhythm; No murmurs, rubs, or gallops  ABDOMEN: Soft, Nondistended; Bowel sounds present; PEG +  EXTREMITIES:  contracted, 2+ Peripheral Pulses, No clubbing, cyanosis, or edema    MEDICATIONS  (STANDING):  artificial  tears Solution 1 Drop(s) Both EYES three times a day  ascorbic acid 500 milliGRAM(s) Oral daily  calcium carbonate 1250 mG  + Vitamin D (OsCal 500 + D) 1 Tablet(s) Oral daily  chlorhexidine 0.12% Liquid 15 milliLiter(s) Oral Mucosa every 12 hours  chlorhexidine 4% Liquid 1 Application(s) Topical <User Schedule>  dextrose 5%. 1000 milliLiter(s) (50 mL/Hr) IV Continuous <Continuous>  dextrose 50% Injectable 12.5 Gram(s) IV Push once  dextrose 50% Injectable 25 Gram(s) IV Push once  dextrose 50% Injectable 25 Gram(s) IV Push once  enoxaparin Injectable 40 milliGRAM(s) SubCutaneous every 12 hours  fentaNYL   Patch  12 MICROgram(s)/Hr 1 Patch Transdermal every 72 hours  insulin glargine Injectable (LANTUS) 10 Unit(s) SubCutaneous every morning  insulin glargine Injectable (LANTUS) 10 Unit(s) SubCutaneous at bedtime  insulin lispro (HumaLOG) corrective regimen sliding scale   SubCutaneous every 6 hours  midodrine 10 milliGRAM(s) Oral every 8 hours  montelukast 10 milliGRAM(s) Oral daily  pantoprazole   Suspension 40 milliGRAM(s) Oral daily  polyethylene glycol 3350 17 Gram(s) Oral daily  sodium chloride 0.9% Bolus 500 milliLiter(s) IV Bolus once    MEDICATIONS  (PRN):  acetaminophen    Suspension .. 650 milliGRAM(s) Oral every 6 hours PRN Temp greater or equal to 38C (100.4F)  dextrose 40% Gel 15 Gram(s) Oral once PRN Blood Glucose LESS THAN 70 milliGRAM(s)/deciliter  glucagon  Injectable 1 milliGRAM(s) IntraMuscular once PRN Glucose LESS THAN 70 milligrams/deciliter  morphine  - Injectable 2 milliGRAM(s) IV Push every 4 hours PRN Severe Pain (7 - 10)      LABS:                        9.7    10.09 )-----------( 274      ( 07 May 2020 09:25 )             32.1     05-07    143  |  104  |  39.0<H>  ----------------------------<  156<H>  4.9   |  26.0  |  0.97    Ca    8.4<L>      07 May 2020 09:22  Phos  2.3     05-07  Mg     3.1     05-07    TPro  6.3<L>  /  Alb  2.4<L>  /  TBili  0.5  /  DBili  x   /  AST  25  /  ALT  21  /  AlkPhos  79  05-06

## 2020-05-07 NOTE — RAPID RESPONSE TEAM SUMMARY - NSMEDICATIONSRRT_GEN_ALL_CORE
Ativan prn for increased work of breathing Ativan prn for increased work of breathing  Full dose Lovenox BID

## 2020-05-07 NOTE — PROGRESS NOTE ADULT - ASSESSMENT
PMHx of frontal lobe dementia, stroke, functional quadriplegia, tracheostomy (on TC at facility), dysphagia s/p PEG who resides in NH presents with fever and worsening hypoxemia. + COVID at NH 4/27.    # Acute on chronic hypoxic resp failure  has Trach and peg  back on vent  s/p MICU  now at 70% FiO2  but later noted to be with increased WOB and poor air entry and FiO2 increased to 80% at which point she became comfortable  hence starting stress dose steroids as a trial to enable weaning, PPI  airway toilet  trach care    # Urine c/s Proteus mirabilis  also has chronic Woody catheter (neurogenic bladder)  unclear colonized vs infection  blood c/s NGTD  ID consulted    # COVID +ve with CXR changes of PNA  sputum c/s Pseudomonas +  has not been on any antibiotic for bacterial infection since admission  ID consulted    # Dementia, stroke, contracted, Functional quadriplegia  full supportive care    # oropharyngeal dysphagia  on PEG feeds  on Insulin while on tube feeds- controlled glucose  titrate insulin PRN    # Normocytic anemia likely sec to chronic dz  Add iron to regimen    # soft BP occasionally but mostly normotensive  change Midodrine to PRN use    Lovenox    D/W CM- per SNF pt has to be 505 or less of FiO2 and PEEP 8 or lower and COVID negative  Plan- rpt COVID test on Monday 5/11/20  or earlier if stable for discharge

## 2020-05-08 LAB
D DIMER BLD IA.RAPID-MCNC: 289 NG/ML DDU — HIGH
GLUCOSE BLDC GLUCOMTR-MCNC: 189 MG/DL — HIGH (ref 70–99)
GLUCOSE BLDC GLUCOMTR-MCNC: 217 MG/DL — HIGH (ref 70–99)
GLUCOSE BLDC GLUCOMTR-MCNC: 246 MG/DL — HIGH (ref 70–99)
GLUCOSE BLDC GLUCOMTR-MCNC: 256 MG/DL — HIGH (ref 70–99)

## 2020-05-08 PROCEDURE — 71275 CT ANGIOGRAPHY CHEST: CPT | Mod: 26,CS

## 2020-05-08 PROCEDURE — 99222 1ST HOSP IP/OBS MODERATE 55: CPT | Mod: CS

## 2020-05-08 PROCEDURE — 70450 CT HEAD/BRAIN W/O DYE: CPT | Mod: 26

## 2020-05-08 PROCEDURE — 99223 1ST HOSP IP/OBS HIGH 75: CPT

## 2020-05-08 PROCEDURE — 99233 SBSQ HOSP IP/OBS HIGH 50: CPT | Mod: CS

## 2020-05-08 RX ORDER — CEFEPIME 1 G/1
2000 INJECTION, POWDER, FOR SOLUTION INTRAMUSCULAR; INTRAVENOUS ONCE
Refills: 0 | Status: COMPLETED | OUTPATIENT
Start: 2020-05-08 | End: 2020-05-08

## 2020-05-08 RX ORDER — SENNA PLUS 8.6 MG/1
2 TABLET ORAL DAILY
Refills: 0 | Status: DISCONTINUED | OUTPATIENT
Start: 2020-05-08 | End: 2020-05-18

## 2020-05-08 RX ORDER — CEFEPIME 1 G/1
2000 INJECTION, POWDER, FOR SOLUTION INTRAMUSCULAR; INTRAVENOUS EVERY 12 HOURS
Refills: 0 | Status: DISCONTINUED | OUTPATIENT
Start: 2020-05-09 | End: 2020-05-18

## 2020-05-08 RX ORDER — CEFEPIME 1 G/1
INJECTION, POWDER, FOR SOLUTION INTRAMUSCULAR; INTRAVENOUS
Refills: 0 | Status: DISCONTINUED | OUTPATIENT
Start: 2020-05-08 | End: 2020-05-18

## 2020-05-08 RX ADMIN — ENOXAPARIN SODIUM 60 MILLIGRAM(S): 100 INJECTION SUBCUTANEOUS at 04:08

## 2020-05-08 RX ADMIN — Medication 0.5 MILLIGRAM(S): at 23:41

## 2020-05-08 RX ADMIN — INSULIN GLARGINE 10 UNIT(S): 100 INJECTION, SOLUTION SUBCUTANEOUS at 00:01

## 2020-05-08 RX ADMIN — ENOXAPARIN SODIUM 60 MILLIGRAM(S): 100 INJECTION SUBCUTANEOUS at 16:59

## 2020-05-08 RX ADMIN — Medication 6: at 23:40

## 2020-05-08 RX ADMIN — Medication 4: at 05:25

## 2020-05-08 RX ADMIN — Medication 60 MILLIGRAM(S): at 04:08

## 2020-05-08 RX ADMIN — PANTOPRAZOLE SODIUM 40 MILLIGRAM(S): 20 TABLET, DELAYED RELEASE ORAL at 11:29

## 2020-05-08 RX ADMIN — Medication 0.5 MILLIGRAM(S): at 04:08

## 2020-05-08 RX ADMIN — MONTELUKAST 10 MILLIGRAM(S): 4 TABLET, CHEWABLE ORAL at 11:29

## 2020-05-08 RX ADMIN — CEFEPIME 100 MILLIGRAM(S): 1 INJECTION, POWDER, FOR SOLUTION INTRAMUSCULAR; INTRAVENOUS at 17:09

## 2020-05-08 RX ADMIN — Medication 300 MILLIGRAM(S): at 11:28

## 2020-05-08 RX ADMIN — Medication 4: at 16:59

## 2020-05-08 RX ADMIN — Medication 1 DROP(S): at 21:00

## 2020-05-08 RX ADMIN — Medication 60 MILLIGRAM(S): at 17:04

## 2020-05-08 RX ADMIN — SENNA PLUS 2 TABLET(S): 8.6 TABLET ORAL at 11:33

## 2020-05-08 RX ADMIN — INSULIN GLARGINE 10 UNIT(S): 100 INJECTION, SOLUTION SUBCUTANEOUS at 21:00

## 2020-05-08 RX ADMIN — FENTANYL CITRATE 1 PATCH: 50 INJECTION INTRAVENOUS at 07:33

## 2020-05-08 RX ADMIN — Medication 1 DROP(S): at 04:07

## 2020-05-08 RX ADMIN — Medication 0.5 MILLIGRAM(S): at 12:04

## 2020-05-08 RX ADMIN — INSULIN GLARGINE 10 UNIT(S): 100 INJECTION, SOLUTION SUBCUTANEOUS at 07:49

## 2020-05-08 RX ADMIN — FENTANYL CITRATE 1 PATCH: 50 INJECTION INTRAVENOUS at 17:10

## 2020-05-08 RX ADMIN — Medication 1 TABLET(S): at 11:28

## 2020-05-08 RX ADMIN — CHLORHEXIDINE GLUCONATE 15 MILLILITER(S): 213 SOLUTION TOPICAL at 17:10

## 2020-05-08 RX ADMIN — CHLORHEXIDINE GLUCONATE 1 APPLICATION(S): 213 SOLUTION TOPICAL at 04:28

## 2020-05-08 RX ADMIN — Medication 1 DROP(S): at 12:43

## 2020-05-08 RX ADMIN — Medication 2: at 11:30

## 2020-05-08 RX ADMIN — POLYETHYLENE GLYCOL 3350 17 GRAM(S): 17 POWDER, FOR SOLUTION ORAL at 11:29

## 2020-05-08 RX ADMIN — Medication 500 MILLIGRAM(S): at 11:33

## 2020-05-08 RX ADMIN — CHLORHEXIDINE GLUCONATE 15 MILLILITER(S): 213 SOLUTION TOPICAL at 04:08

## 2020-05-08 NOTE — PROGRESS NOTE ADULT - ASSESSMENT
PMHx of frontal lobe dementia, stroke, functional quadriplegia, tracheostomy (on TC at facility), dysphagia s/p PEG who resides in NH presents with fever and worsening hypoxemia. + COVID at NH 4/27.    # r/o Sz  2 episodes of pt  posturing stiffening and agonal breathing with spontaneous resolution in 1-2 min  ativan for WOB and agitation on her regimen and used  CT head ordered  No AEDs started  Neurology consulted    # Acute on chronic hypoxic resp failure  has Trach and peg  back on vent  s/p MICU  now at 70% FiO2. weaning to 60% now  sec to increased WOB and decreased air entry started stress dose steroids on 5/7 as a trial to enable weaning, PPI  airway toilet  trach care    # Urine c/s Proteus mirabilis  also has chronic Woody catheter (neurogenic bladder)  unclear colonized vs infection  blood c/s NGTD  ID consulted    # COVID +ve with CXR changes of PNA  sputum c/s Pseudomonas +  has not been on any antibiotic for bacterial infection since admission  ID consulted    # Dementia, stroke, contracted, Functional quadriplegia  full supportive care    # oropharyngeal dysphagia  on PEG feeds  on Insulin while on tube feeds- controlled glucose  titrate insulin PRN    # Normocytic anemia likely sec to chronic dz  Add iron to regimen    # soft BP occasionally but mostly normotensive  change Midodrine to PRN use    Lovenox    D/W CM- per SNF pt has to be 50% or less of FiO2 and PEEP 8 or lower and COVID negative  Plan- rpt COVID test on Monday 5/11/20  or earlier if stable for discharge  d/w spouse- who states that she is on enema regimen 3 x week. same instated here. PMHx of frontal lobe dementia, stroke, functional quadriplegia, tracheostomy (on TC at facility), dysphagia s/p PEG who resides in NH presents with fever and worsening hypoxemia. + COVID at NH 4/27. admitted to MICU here on full vent and pt did not need any pressor support. d/g on vent via trach. on the floor had RRT for hypoxia during attempts to take her down to CTA chest. CTA chest was ordered fro episodes of tachypnea and WOb which prompted to increase FiO2 and to r/o PE. Then CTA postponed to later date. 2 episodes of posturing and stiffening noted with agonal breathing. Neuro consulted. Ct head ordered.     # r/o Sz  2 episodes of pt  posturing stiffening and agonal breathing with spontaneous resolution in 1-2 min  ativan for WOB and agitation on her regimen and used  CT head ordered  No AEDs started  Neurology consulted    # Acute on chronic hypoxic resp failure  has Trach and peg  back on vent  s/p MICU  now at 70% FiO2. weaning to 60% now  sec to increased WOB and decreased air entry started stress dose steroids on 5/7 as a trial to enable weaning, PPI  airway toilet  trach care    # Urine c/s Proteus mirabilis  also has chronic Woody catheter (neurogenic bladder)  unclear colonized vs infection  blood c/s NGTD  ID consulted    # COVID +ve with CXR changes of PNA  sputum c/s Pseudomonas +  has not been on any antibiotic for bacterial infection since admission  ID consulted    # Dementia, stroke, contracted, Functional quadriplegia  full supportive care    # oropharyngeal dysphagia  on PEG feeds  on Insulin while on tube feeds- controlled glucose  titrate insulin PRN    # Normocytic anemia likely sec to chronic dz  Add iron to regimen    # soft BP occasionally but mostly normotensive  change Midodrine to PRN use    Lovenox    D/W CM- per SNF pt has to be 50% or less of FiO2 and PEEP 8 or lower and COVID negative  Plan- rpt COVID test on Monday 5/11/20  or earlier if stable for discharge  d/w spouse- who states that she is on enema regimen 3 x week. same instated here. PMHx of frontal lobe dementia, stroke, functional quadriplegia, tracheostomy (on TC at facility), dysphagia s/p PEG who resides in NH presents with fever and worsening hypoxemia. + COVID at NH 4/27. admitted to MICU here on full vent and pt did not need any pressor support. d/g on vent via trach. on the floor had RRT for hypoxia during attempts to take her down to CTA chest. CTA chest was ordered fro episodes of tachypnea and WOb which prompted to increase FiO2 and to r/o PE. Then CTA postponed to later date. 2 episodes of posturing and stiffening noted with agonal breathing. Neuro consulted. Ct head ordered.     # r/o Sz  2 episodes of pt  posturing stiffening and agonal breathing with spontaneous resolution in 1-2 min  ativan for WOB and agitation on her regimen and used  CT head ordered  No AEDs started  Neurology consulted    # Acute on chronic hypoxic resp failure  has Trach and peg  back on vent  s/p MICU  now at 70% FiO2. weaning to 60% now  sec to increased WOB and decreased air entry started stress dose steroids on 5/7 as a trial to enable weaning, PPI  airway toilet  trach care    # Urine c/s Proteus mirabilis  also has chronic Woody catheter (neurogenic bladder)  unclear colonized vs infection  blood c/s NGTD  ID consulted    # COVID +ve with CXR changes of PNA  sputum c/s Pseudomonas +  has not been on any antibiotic for bacterial infection since admission  ID consulted    # Dementia, stroke, contracted, Functional quadriplegia  full supportive care    # oropharyngeal dysphagia  on PEG feeds  on Insulin while on tube feeds- controlled glucose  titrate insulin PRN    # Normocytic anemia likely sec to chronic dz  Add iron to regimen    # soft BP occasionally but mostly normotensive  change Midodrine to PRN use    Lovenox    D/W CM- per SNF pt has to be 50% or less of FiO2 and PEEP 8 or lower and COVID negative  Plan- rpt COVID test on Monday 5/11/20  or earlier if stable for discharge  d/w spouse- who states that she is on enema regimen 3 x week. same instated here.     ADDENDUM  ID input appreciated. Cefipime started. Can rpt COVID early next week for Rehab needs

## 2020-05-08 NOTE — CONSULT NOTE ADULT - ASSESSMENT
The patient is a 74y Female who is followed by neurology because of possible seizure    Typically people do not arch their back during seizure  she was reported to be breathing agonally at the time  may have been reaction to hypoxia    She has extremely extensive atrophy of her entire cerebral hemispheres with spastic quadriplegia  She carries diagnosis of frontal dementia, but has wide spread atrophy  She is likely at end stages of her disease  It appears that she would be an appropriate candidate for hospice, and if family agrees I suggest  palliative care and hospice evaluations    discussed with Dr Rodrigues    Thank you for allowing me to participate in the care of your patient    Sixto Rosado MD, PhD   [id number]

## 2020-05-08 NOTE — CONSULT NOTE ADULT - SUBJECTIVE AND OBJECTIVE BOX
[facility]                                     [facility] [place]                                 Alonzo Bourgeois, & Jak                                  [address]                                        [place], [place], [address]                                             [phone]    CC: possible seizure  HPI:  The patient is a 74y Female who presented with hypoxemia and tested positive for COVID.  She has baseline severe dementia and is quadriplegic.  Today she had episode of agonal breathing and arched back.  Neurology is asked ot evaluate for seizure.    PAST MEDICAL & SURGICAL HISTORY:  Quadriplegia  COVID-19 virus detected  Advanced dementia  DM (diabetes mellitus)  HTN (hypertension)  CVA (cerebral vascular accident)  Respiratory failure  Constipation  GERD (gastroesophageal reflux disease)  Hypertension  Respiratory failure  Diabetes mellitus  Aphasic stroke  Dementia of frontal lobe type  Feeding by G-tube  Tracheostomy tube present  Tracheostomy in place  Gastrostomy in place  Hx of appendectomy      MEDICATIONS  (STANDING):  artificial  tears Solution 1 Drop(s) Both EYES three times a day  ascorbic acid 500 milliGRAM(s) Oral daily  calcium carbonate 1250 mG  + Vitamin D (OsCal 500 + D) 1 Tablet(s) Oral daily  cefepime   IVPB      cefepime   IVPB 2000 milliGRAM(s) IV Intermittent once  chlorhexidine 0.12% Liquid 15 milliLiter(s) Oral Mucosa every 12 hours  chlorhexidine 4% Liquid 1 Application(s) Topical <User Schedule>  dextrose 5%. 1000 milliLiter(s) (50 mL/Hr) IV Continuous <Continuous>  dextrose 50% Injectable 12.5 Gram(s) IV Push once  dextrose 50% Injectable 25 Gram(s) IV Push once  dextrose 50% Injectable 25 Gram(s) IV Push once  enoxaparin Injectable 60 milliGRAM(s) SubCutaneous every 12 hours  fentaNYL   Patch  12 MICROgram(s)/Hr 1 Patch Transdermal every 72 hours  ferrous    sulfate Liquid 300 milliGRAM(s) Enteral Tube daily  insulin glargine Injectable (LANTUS) 10 Unit(s) SubCutaneous every morning  insulin glargine Injectable (LANTUS) 10 Unit(s) SubCutaneous at bedtime  insulin lispro (HumaLOG) corrective regimen sliding scale   SubCutaneous every 6 hours  methylPREDNISolone sodium succinate Injectable 60 milliGRAM(s) IV Push two times a day  montelukast 10 milliGRAM(s) Oral daily  pantoprazole   Suspension 40 milliGRAM(s) Oral daily  polyethylene glycol 3350 17 Gram(s) Oral daily  senna 2 Tablet(s) Oral daily  sodium chloride 0.9% Bolus 500 milliLiter(s) IV Bolus once    MEDICATIONS  (PRN):  acetaminophen    Suspension .. 650 milliGRAM(s) Oral every 6 hours PRN Temp greater or equal to 38C (100.4F)  dextrose 40% Gel 15 Gram(s) Oral once PRN Blood Glucose LESS THAN 70 milliGRAM(s)/deciliter  glucagon  Injectable 1 milliGRAM(s) IntraMuscular once PRN Glucose LESS THAN 70 milligrams/deciliter  LORazepam   Injectable 0.5 milliGRAM(s) IV Push every 4 hours PRN tachypnea  midodrine 10 milliGRAM(s) Oral every 8 hours PRN for sBP of 100 or less  morphine  - Injectable 2 milliGRAM(s) IV Push every 4 hours PRN Severe Pain (7 - 10)      Allergies    codeine (Hives)    Intolerances        SOCIAL HISTORY:  unable to assess    FAMILY HISTORY:  unable to assess        ROS: unable to assess    Vital Signs Last 24 Hrs  T(C): 36.4 (08 May 2020 08:47), Max: 37.3 (07 May 2020 16:05)  T(F): 97.6 (08 May 2020 08:47), Max: 99.1 (07 May 2020 16:05)  HR: 105 (08 May 2020 12:13) (57 - 134)  BP: 103/60 (08 May 2020 08:47) (103/60 - 176/84)  BP(mean): --  RR: 22 (08 May 2020 08:47) (22 - 30)  SpO2: 98% (08 May 2020 12:13) (91% - 100%)      General: NAD    Detailed Neurologic Exam:    Mental status: The patient has eyes open not attentive to her environement    Cranial nerves: Pupils reactive There is no blink to threat.  some roving eye movement observed, no tracking  Extraocular motion is full with no nystagmus. Unable to assess facial sensation, tongue extension or palate elevation. Facial musculature appears grossly symmetric. e.    Motor: There is reduced bulk and spastic tone.  There is no tremor.  spastic quadriplegia with contractures in 4 ext    Sensation: no reposnse to pinch x 4 ext    Reflexes: unable to elicit DTRs, toes are dystonically flexed    Cerebellar: unable to assess dysmetria on finger to nose testing.    Gait : deferred    LABS:                         9.7    10.09 )-----------( 274      ( 07 May 2020 09:25 )             32.1       05-07    143  |  104  |  39.0<H>  ----------------------------<  156<H>  4.9   |  26.0  |  0.97    Ca    8.4<L>      07 May 2020 09:22  Phos  2.3     05-07  Mg     3.1     05-07      RADIOLOGY & ADDITIONAL STUDIES (independently reviewed unless otherwise noted):  head CT- no clear VCA, mass or blood.  (+) movement (+) significant atrophy

## 2020-05-08 NOTE — PROGRESS NOTE ADULT - SUBJECTIVE AND OBJECTIVE BOX
HEALTH ISSUES - PROBLEM Dx:    Vent dependent Trach, hypoxia, r/o seizures    INTERVAL HPI/ OVERNIGHT EVENTS:    pt lying in bed comfortable on vent via trach  AC/ 14/ 350/ 70%/ 6  weaning to FiO2 60% today    overnight- last evening RRT for 1 episode of hypoxia, bradycardia, hypotension    today noon- 1 episode of ? sz like activity pt went stiff, posturing and possible arching of body and agonal. spontaneous resolution    REVIEW OF SYSTEMS:    as above    Vital Signs Last 24 Hrs  T(C): 36.4 (08 May 2020 08:47), Max: 37.3 (07 May 2020 16:05)  T(F): 97.6 (08 May 2020 08:47), Max: 99.1 (07 May 2020 16:05)  HR: 105 (08 May 2020 12:13) (57 - 134)  BP: 103/60 (08 May 2020 08:47) (103/60 - 176/84)  BP(mean): --  RR: 22 (08 May 2020 08:47) (22 - 30)  SpO2: 98% (08 May 2020 12:13) (91% - 100%)    PHYSICAL EXAM-  GENERAL: demented, no real response, comfortable  HEAD:  Atraumatic, Normocephalic  EYES: conjunctiva and sclera clear  ENT: Moist mucous membranes, Trach +  NECK: No JVD  NERVOUS SYSTEM:  demented, contracted,   CHEST/LUNG: conducted breath sounds; No rales, rhonchi, wheezing, or rubs  HEART: Regular rate and rhythm; No murmurs, rubs, or gallops  ABDOMEN: Soft, Nondistended; Bowel sounds present; PEG +  EXTREMITIES:  contracted, 2+ Peripheral Pulses, No clubbing, cyanosis, or edema    MEDICATIONS  (STANDING):  artificial  tears Solution 1 Drop(s) Both EYES three times a day  ascorbic acid 500 milliGRAM(s) Oral daily  calcium carbonate 1250 mG  + Vitamin D (OsCal 500 + D) 1 Tablet(s) Oral daily  chlorhexidine 0.12% Liquid 15 milliLiter(s) Oral Mucosa every 12 hours  chlorhexidine 4% Liquid 1 Application(s) Topical <User Schedule>  dextrose 5%. 1000 milliLiter(s) (50 mL/Hr) IV Continuous <Continuous>  dextrose 50% Injectable 12.5 Gram(s) IV Push once  dextrose 50% Injectable 25 Gram(s) IV Push once  dextrose 50% Injectable 25 Gram(s) IV Push once  enoxaparin Injectable 60 milliGRAM(s) SubCutaneous every 12 hours  fentaNYL   Patch  12 MICROgram(s)/Hr 1 Patch Transdermal every 72 hours  ferrous    sulfate Liquid 300 milliGRAM(s) Enteral Tube daily  insulin glargine Injectable (LANTUS) 10 Unit(s) SubCutaneous every morning  insulin glargine Injectable (LANTUS) 10 Unit(s) SubCutaneous at bedtime  insulin lispro (HumaLOG) corrective regimen sliding scale   SubCutaneous every 6 hours  methylPREDNISolone sodium succinate Injectable 60 milliGRAM(s) IV Push two times a day  montelukast 10 milliGRAM(s) Oral daily  pantoprazole   Suspension 40 milliGRAM(s) Oral daily  polyethylene glycol 3350 17 Gram(s) Oral daily  senna 2 Tablet(s) Oral daily  sodium chloride 0.9% Bolus 500 milliLiter(s) IV Bolus once    MEDICATIONS  (PRN):  acetaminophen    Suspension .. 650 milliGRAM(s) Oral every 6 hours PRN Temp greater or equal to 38C (100.4F)  dextrose 40% Gel 15 Gram(s) Oral once PRN Blood Glucose LESS THAN 70 milliGRAM(s)/deciliter  glucagon  Injectable 1 milliGRAM(s) IntraMuscular once PRN Glucose LESS THAN 70 milligrams/deciliter  LORazepam   Injectable 0.5 milliGRAM(s) IV Push every 4 hours PRN tachypnea  midodrine 10 milliGRAM(s) Oral every 8 hours PRN for sBP of 100 or less  morphine  - Injectable 2 milliGRAM(s) IV Push every 4 hours PRN Severe Pain (7 - 10)      LABS:                        9.7    10.09 )-----------( 274      ( 07 May 2020 09:25 )             32.1     05-07    143  |  104  |  39.0<H>  ----------------------------<  156<H>  4.9   |  26.0  |  0.97    Ca    8.4<L>      07 May 2020 09:22  Phos  2.3     05-07  Mg     3.1     05-07

## 2020-05-08 NOTE — CONSULT NOTE ADULT - SUBJECTIVE AND OBJECTIVE BOX
[facility]  INFECTIOUS DISEASES AND INTERNAL MEDICINE at [place]  =======================================================  Sid Mendoza MD  Diplomates American Board of Internal Medicine and Infectious Diseases  =======================================================    [id number]  BREA BECKHAM     CC:  acute respiratory failure     HPI:  73 y/o woman with with hx of frontal lobe dementia, stroke, functional quadriplegia, tracheostomy normally on trach collar, dysphagia s/p PEG, nursing home patient was admitted on 5/4 with with fever and worsening hypoxemia. She tested positive for COVID19 in the nursing home. Sputum culture showed pseudomonas and urine proteus so ID was called for recommendations. Patient is awake but not communicative. Due to worsening of respiration and higher FIO2 requirement and also possible seizure, CTA and head CT were done today.     PAST MEDICAL & SURGICAL HISTORY:  Quadriplegia  COVID-19 virus detected  Advanced dementia  DM (diabetes mellitus)  HTN (hypertension)  CVA (cerebral vascular accident)  Respiratory failure  Constipation  GERD (gastroesophageal reflux disease)  Hypertension  Respiratory failure  Diabetes mellitus  Aphasic stroke  Dementia of frontal lobe type  Feeding by G-tube  Tracheostomy tube present  Tracheostomy in place  Gastrostomy in place  Hx of appendectomy    Social Hx: no smoking    FAMILY HISTORY:  No pertinent family history in first degree relatives    Allergies  codeine (Hives)    Antibiotics:   cefepime   IVPB 2000 milliGRAM(s) IV Intermittent once     REVIEW OF SYSTEMS:  Nonverbal     Physical Exam:  Vital Signs Last 24 Hrs  T(C): 36.4 (08 May 2020 08:47), Max: 37.3 (07 May 2020 16:05)  T(F): 97.6 (08 May 2020 08:47), Max: 99.1 (07 May 2020 16:05)  HR: 105 (08 May 2020 12:13) (57 - 134)  BP: 103/60 (08 May 2020 08:47) (103/60 - 176/84)  RR: 22 (08 May 2020 08:47) (22 - 30)  SpO2: 98% (08 May 2020 12:13) (91% - 100%)  GEN: NAD  HEENT: normocephalic and atraumatic.   NECK: Supple.  No lymphadenopathy   LUNGS: Clear to auscultation.  HEART: Regular rate and rhythm   ABDOMEN: Soft, nontender, and nondistended.  Positive bowel sounds. +PEG  EXTREMITIES: Without edema.  NEUROLOGIC: Contracted all extremities   PSYCHIATRIC: Awake but not communicative   SKIN: No ulceration or induration present.    Labs:  05-07    143  |  104  |  39.0<H>  ----------------------------<  156<H>  4.9   |  26.0  |  0.97    Ca    8.4<L>      07 May 2020 09:22  Phos  2.3     05-07  Mg     3.1     05-07                        9.7    10.09 )-----------( 274      ( 07 May 2020 09:25 )             32.1     RECENT CULTURES:  05-05 @ 17:02 .Sputum Sputum Pseudomonas aeruginosa    Few Pseudomonas aeruginosa  Normal Respiratory Elvira present    Numerous polymorphonuclear leukocytes seen per low power field  Few Squamous epithelial cells seen per low power field  Moderate Gram Positive Rods seen per oil power field  Few Gram Negative Rods seen per oil power field  Few Gram positive cocci in pairs, chains and clusters seen per oil power  field    05-04 @ 21:22 .Urine Clean Catch (Midstream) Proteus mirabilis    >100,000 CFU/ml Proteus mirabilis    05-04 @ 14:01 .Blood Blood-Peripheral     No growth at 48 hours    All imaging and other data have been reviewed.      Assessment and Plan:   73 y/o woman with with hx of frontal lobe dementia, stroke, functional quadriplegia, tracheostomy normally on trach collar, dysphagia s/p PEG, nursing home patient was admitted on 5/4 with with fever and worsening hypoxemia. She tested positive for COVID19 in the nursing home. Sputum culture showed pseudomonas and urine proteus so ID was called for recommendations. Patient is awake but not communicative. Due to worsening of respiration and higher FIO2 requirement and also possible seizure, CTA and head CT were done.    COVID 19 + infection  Viral pneumonia  Acute hypoxic respiratory failure  Bacterial pneumonia  UTI  Stroke  Chronic trach and PEG    - Airborne and contact isolation   - COVID 19 PCR positive   - CXR with multifocal opacities   - Procalcitonin 30.47  - CRP=36.32, Sxagtxzv=422 and VWT=491  - Check CBC with diff, CMP with LFT's, Inflammatory markers (D-dimer, CRP, Ferritin, LDH,  procalcitonin)  q48h.    - Blood culture negative  - Sputum culture with Pseudomonas   - UA negative but urine culture with proteus that could be contamination   - Start Cefepime 2gm q12 to cover sputum and urine both     Will follow.    Case discussed with Dr. Rodrigues.

## 2020-05-09 DIAGNOSIS — E11.9 TYPE 2 DIABETES MELLITUS WITHOUT COMPLICATIONS: ICD-10-CM

## 2020-05-09 DIAGNOSIS — J96.01 ACUTE RESPIRATORY FAILURE WITH HYPOXIA: ICD-10-CM

## 2020-05-09 DIAGNOSIS — J18.9 PNEUMONIA, UNSPECIFIED ORGANISM: ICD-10-CM

## 2020-05-09 DIAGNOSIS — F03.90 UNSPECIFIED DEMENTIA WITHOUT BEHAVIORAL DISTURBANCE: ICD-10-CM

## 2020-05-09 DIAGNOSIS — N39.0 URINARY TRACT INFECTION, SITE NOT SPECIFIED: ICD-10-CM

## 2020-05-09 LAB
ANION GAP SERPL CALC-SCNC: 21 MMOL/L — HIGH (ref 5–17)
BUN SERPL-MCNC: 55 MG/DL — HIGH (ref 8–20)
CALCIUM SERPL-MCNC: 9 MG/DL — SIGNIFICANT CHANGE UP (ref 8.6–10.2)
CHLORIDE SERPL-SCNC: 102 MMOL/L — SIGNIFICANT CHANGE UP (ref 98–107)
CO2 SERPL-SCNC: 21 MMOL/L — LOW (ref 22–29)
CREAT SERPL-MCNC: 0.98 MG/DL — SIGNIFICANT CHANGE UP (ref 0.5–1.3)
CULTURE RESULTS: SIGNIFICANT CHANGE UP
CULTURE RESULTS: SIGNIFICANT CHANGE UP
GLUCOSE BLDC GLUCOMTR-MCNC: 166 MG/DL — HIGH (ref 70–99)
GLUCOSE BLDC GLUCOMTR-MCNC: 170 MG/DL — HIGH (ref 70–99)
GLUCOSE BLDC GLUCOMTR-MCNC: 190 MG/DL — HIGH (ref 70–99)
GLUCOSE BLDC GLUCOMTR-MCNC: 195 MG/DL — HIGH (ref 70–99)
GLUCOSE BLDC GLUCOMTR-MCNC: 213 MG/DL — HIGH (ref 70–99)
GLUCOSE SERPL-MCNC: 162 MG/DL — HIGH (ref 70–99)
HCT VFR BLD CALC: 30.6 % — LOW (ref 34.5–45)
HGB BLD-MCNC: 9.1 G/DL — LOW (ref 11.5–15.5)
MAGNESIUM SERPL-MCNC: 3.2 MG/DL — HIGH (ref 1.6–2.6)
MCHC RBC-ENTMCNC: 28 PG — SIGNIFICANT CHANGE UP (ref 27–34)
MCHC RBC-ENTMCNC: 29.7 GM/DL — LOW (ref 32–36)
MCV RBC AUTO: 94.2 FL — SIGNIFICANT CHANGE UP (ref 80–100)
PHOSPHATE SERPL-MCNC: 3.5 MG/DL — SIGNIFICANT CHANGE UP (ref 2.4–4.7)
PLATELET # BLD AUTO: 538 K/UL — HIGH (ref 150–400)
POTASSIUM SERPL-MCNC: 4.9 MMOL/L — SIGNIFICANT CHANGE UP (ref 3.5–5.3)
POTASSIUM SERPL-SCNC: 4.9 MMOL/L — SIGNIFICANT CHANGE UP (ref 3.5–5.3)
RBC # BLD: 3.25 M/UL — LOW (ref 3.8–5.2)
RBC # FLD: 14.9 % — HIGH (ref 10.3–14.5)
SODIUM SERPL-SCNC: 144 MMOL/L — SIGNIFICANT CHANGE UP (ref 135–145)
SPECIMEN SOURCE: SIGNIFICANT CHANGE UP
SPECIMEN SOURCE: SIGNIFICANT CHANGE UP
WBC # BLD: 18.26 K/UL — HIGH (ref 3.8–10.5)
WBC # FLD AUTO: 18.26 K/UL — HIGH (ref 3.8–10.5)

## 2020-05-09 PROCEDURE — 99233 SBSQ HOSP IP/OBS HIGH 50: CPT | Mod: CS

## 2020-05-09 PROCEDURE — 99232 SBSQ HOSP IP/OBS MODERATE 35: CPT | Mod: CS

## 2020-05-09 RX ADMIN — CEFEPIME 100 MILLIGRAM(S): 1 INJECTION, POWDER, FOR SOLUTION INTRAMUSCULAR; INTRAVENOUS at 04:44

## 2020-05-09 RX ADMIN — INSULIN GLARGINE 10 UNIT(S): 100 INJECTION, SOLUTION SUBCUTANEOUS at 08:31

## 2020-05-09 RX ADMIN — Medication 0.5 MILLIGRAM(S): at 10:01

## 2020-05-09 RX ADMIN — Medication 0.5 MILLIGRAM(S): at 05:03

## 2020-05-09 RX ADMIN — ENOXAPARIN SODIUM 60 MILLIGRAM(S): 100 INJECTION SUBCUTANEOUS at 04:45

## 2020-05-09 RX ADMIN — ENOXAPARIN SODIUM 60 MILLIGRAM(S): 100 INJECTION SUBCUTANEOUS at 17:38

## 2020-05-09 RX ADMIN — Medication 2: at 05:21

## 2020-05-09 RX ADMIN — FENTANYL CITRATE 1 PATCH: 50 INJECTION INTRAVENOUS at 07:10

## 2020-05-09 RX ADMIN — SENNA PLUS 2 TABLET(S): 8.6 TABLET ORAL at 12:16

## 2020-05-09 RX ADMIN — Medication 500 MILLIGRAM(S): at 12:16

## 2020-05-09 RX ADMIN — Medication 4: at 12:11

## 2020-05-09 RX ADMIN — POLYETHYLENE GLYCOL 3350 17 GRAM(S): 17 POWDER, FOR SOLUTION ORAL at 12:16

## 2020-05-09 RX ADMIN — CHLORHEXIDINE GLUCONATE 15 MILLILITER(S): 213 SOLUTION TOPICAL at 17:38

## 2020-05-09 RX ADMIN — CHLORHEXIDINE GLUCONATE 1 APPLICATION(S): 213 SOLUTION TOPICAL at 04:44

## 2020-05-09 RX ADMIN — PANTOPRAZOLE SODIUM 40 MILLIGRAM(S): 20 TABLET, DELAYED RELEASE ORAL at 12:16

## 2020-05-09 RX ADMIN — CEFEPIME 100 MILLIGRAM(S): 1 INJECTION, POWDER, FOR SOLUTION INTRAMUSCULAR; INTRAVENOUS at 17:38

## 2020-05-09 RX ADMIN — MONTELUKAST 10 MILLIGRAM(S): 4 TABLET, CHEWABLE ORAL at 12:16

## 2020-05-09 RX ADMIN — Medication 1 DROP(S): at 14:15

## 2020-05-09 RX ADMIN — CHLORHEXIDINE GLUCONATE 15 MILLILITER(S): 213 SOLUTION TOPICAL at 04:44

## 2020-05-09 RX ADMIN — Medication 2: at 22:56

## 2020-05-09 RX ADMIN — FENTANYL CITRATE 1 PATCH: 50 INJECTION INTRAVENOUS at 19:35

## 2020-05-09 RX ADMIN — Medication 60 MILLIGRAM(S): at 17:37

## 2020-05-09 RX ADMIN — Medication 1 DROP(S): at 04:44

## 2020-05-09 RX ADMIN — INSULIN GLARGINE 10 UNIT(S): 100 INJECTION, SOLUTION SUBCUTANEOUS at 22:55

## 2020-05-09 RX ADMIN — Medication 60 MILLIGRAM(S): at 04:45

## 2020-05-09 RX ADMIN — Medication 0.5 MILLIGRAM(S): at 20:27

## 2020-05-09 RX ADMIN — Medication 1 DROP(S): at 22:55

## 2020-05-09 RX ADMIN — Medication 300 MILLIGRAM(S): at 12:16

## 2020-05-09 RX ADMIN — Medication 1 TABLET(S): at 12:16

## 2020-05-09 RX ADMIN — Medication 2: at 17:50

## 2020-05-09 NOTE — PROGRESS NOTE ADULT - PROBLEM SELECTOR PLAN 1
Sec COVID 19 infection and bacterial pneumonia, continue ventilator support. Pulmonary consulted for ventilator management. Continue Cefepime.

## 2020-05-09 NOTE — PROGRESS NOTE ADULT - ATTENDING COMMENTS
Discussed with patient's , Marciano, overall poor prognosis, he wants patient to be full code.  Discussed with RN.

## 2020-05-09 NOTE — PROGRESS NOTE ADULT - ASSESSMENT
74 yr old female with frontal lobe dementia, stroke, diabetes mellitus, hypertension, functional quadriplegia, chronic respiratory failure on trach collar, s/p PEG was sent in for evaluation  of hypoxia and fever. She is COVID 19 positive and was admitted to ICU, trach changed to cuff and was placed on full mechanical ventilation. She was on pressors, subsequently discontinued and was transferred to medical floor on 5/6/20. Noted to have UTI and sputum cultures growing Pseudomonas. RRT was called on 5/7 for hypoxia, CTA chest was done, negative for pulmonary embolism. CT head was done for concern for altered mental status, negative for stroke. Neurology consulted for evaluation of possible seizure like activity, advised likely secondary to hypoxia and palliative care evaluation was recommended. Cefepime was started for pneumonia.

## 2020-05-09 NOTE — PROGRESS NOTE ADULT - SUBJECTIVE AND OBJECTIVE BOX
[facility]  INFECTIOUS DISEASES AND INTERNAL MEDICINE at [place]  =======================================================  Sid Mendoza MD  Diplomates American Board of Internal Medicine and Infectious Diseases  =======================================================    [facility]-[id number]  BREA BECKHAM     Follow up:  acute respiratory failure     No changes, noncommunicative. NAD, On vent trough trach.     PAST MEDICAL & SURGICAL HISTORY:  Quadriplegia  COVID-19 virus detected  Advanced dementia  DM (diabetes mellitus)  HTN (hypertension)  CVA (cerebral vascular accident)  Respiratory failure  Constipation  GERD (gastroesophageal reflux disease)  Hypertension  Respiratory failure  Diabetes mellitus  Aphasic stroke  Dementia of frontal lobe type  Feeding by G-tube  Tracheostomy tube present  Tracheostomy in place  Gastrostomy in place  Hx of appendectomy    Social Hx: no smoking    FAMILY HISTORY:  No pertinent family history in first degree relatives    Allergies  codeine (Hives)    Antibiotics:   cefepime   IVPB 2000 milliGRAM(s) IV Intermittent once     REVIEW OF SYSTEMS:  Nonverbal     Physical Exam:  Vital Signs Last 24 Hrs  T(C): 36.9 (09 May 2020 07:35), Max: 36.9 (08 May 2020 23:38)  T(F): 98.4 (09 May 2020 07:35), Max: 98.5 (08 May 2020 23:38)  HR: 72 (09 May 2020 08:32) (53 - 105)  BP: 111/67 (09 May 2020 07:35) (103/61 - 113/64)  BP(mean): --  RR: 18 (09 May 2020 07:35) (18 - 18)  SpO2: 93% (09 May 2020 08:32) (93% - 99%)  GEN: NAD  HEENT: normocephalic and atraumatic.   NECK: Supple.  No lymphadenopathy   LUNGS: Clear to auscultation.  HEART: Regular rate and rhythm   ABDOMEN: Soft, nontender, and nondistended.  Positive bowel sounds. +PEG  EXTREMITIES: Without edema.  NEUROLOGIC: Contracted all extremities   PSYCHIATRIC: Awake but not communicative   SKIN: No ulceration or induration present.    Labs:  05-09    144  |  102  |  55.0<H>  ----------------------------<  162<H>  4.9   |  21.0<L>  |  0.98    Ca    9.0      09 May 2020 06:18  Phos  3.5     05-09  Mg     3.2     05-09                        9.1    18.26 )-----------( 538      ( 09 May 2020 06:18 )             30.6     RECENT CULTURES:  05-05 @ 17:02 .Sputum Sputum Pseudomonas aeruginosa    Few Pseudomonas aeruginosa  Normal Respiratory Elvira present    Numerous polymorphonuclear leukocytes seen per low power field  Few Squamous epithelial cells seen per low power field  Moderate Gram Positive Rods seen per oil power field  Few Gram Negative Rods seen per oil power field  Few Gram positive cocci in pairs, chains and clusters seen per oil power  field    05-04 @ 21:22 .Urine Clean Catch (Midstream) Proteus mirabilis    >100,000 CFU/ml Proteus mirabilis    05-04 @ 14:01 .Blood Blood-Peripheral     No growth at 48 hours    All imaging and other data have been reviewed.      Assessment and Plan:   75 y/o woman with with hx of frontal lobe dementia, stroke, functional quadriplegia, tracheostomy normally on trach collar, dysphagia s/p PEG, nursing home patient was admitted on 5/4 with with fever and worsening hypoxemia. She tested positive for COVID19 in the nursing home. Sputum culture showed pseudomonas and urine proteus so ID was called for recommendations. Patient is awake but not communicative. Due to worsening of respiration and higher FIO2 requirement and also possible seizure, CTA and head CT were done.    COVID 19 + infection  Viral pneumonia  Acute hypoxic respiratory failure  Bacterial pneumonia  UTI  Stroke  Chronic trach and PEG    - Airborne and contact isolation   - COVID 19 PCR positive   - CXR with multifocal opacities   - Procalcitonin 30.47  - CRP=36.32, Tlkrpuhc=694 and XWL=456  - Check CBC with diff, CMP with LFT's, Inflammatory markers (D-dimer, CRP, Ferritin, LDH,  procalcitonin)  q48h.    - Blood culture negative  - Sputum culture with Pseudomonas pansensitive   - UA negative but urine culture with proteus that could be contamination   - Continue Cefepime 2gm q12 to cover sputum and urine both     Will follow.    Case discussed with Dr. Rodrigues.

## 2020-05-09 NOTE — PROGRESS NOTE ADULT - SUBJECTIVE AND OBJECTIVE BOX
INTERVAL HPI/OVERNIGHT EVENTS:    CC: acute hypoxic respiratory failure sec COVID 19 infection and Pseudomonas pneumonia, frontal lobe dementia, h/o stroke, diabetes mellitus, hypertension, functional quadriplegia      Chart and course reviewed. Continues to be ventilator dependent, responds to tactile stimuli    Vital Signs Last 24 Hrs  T(C): 36.9 (09 May 2020 07:35), Max: 36.9 (08 May 2020 23:38)  T(F): 98.4 (09 May 2020 07:35), Max: 98.5 (08 May 2020 23:38)  HR: 85 (09 May 2020 12:25) (53 - 92)  BP: 111/67 (09 May 2020 07:35) (103/61 - 113/64)  BP(mean): --  RR: 18 (09 May 2020 07:35) (18 - 18)  SpO2: 100% (09 May 2020 12:25) (93% - 100%)    PHYSICAL EXAM:    GENERAL: thin built female, not in distress, s/p trach, on mechanical ventilation, responds to tactile stimuli  CHEST/LUNG: b/l air entry, coarse breath sounds  HEART: regular  ABDOMEN: soft, non tender  EXTREMITIES:  no edema, tenderness    MEDICATIONS  (STANDING):  artificial  tears Solution 1 Drop(s) Both EYES three times a day  ascorbic acid 500 milliGRAM(s) Oral daily  calcium carbonate 1250 mG  + Vitamin D (OsCal 500 + D) 1 Tablet(s) Oral daily  cefepime   IVPB      cefepime   IVPB 2000 milliGRAM(s) IV Intermittent every 12 hours  chlorhexidine 0.12% Liquid 15 milliLiter(s) Oral Mucosa every 12 hours  chlorhexidine 4% Liquid 1 Application(s) Topical <User Schedule>  dextrose 5%. 1000 milliLiter(s) (50 mL/Hr) IV Continuous <Continuous>  dextrose 50% Injectable 12.5 Gram(s) IV Push once  dextrose 50% Injectable 25 Gram(s) IV Push once  dextrose 50% Injectable 25 Gram(s) IV Push once  enoxaparin Injectable 60 milliGRAM(s) SubCutaneous every 12 hours  fentaNYL   Patch  12 MICROgram(s)/Hr 1 Patch Transdermal every 72 hours  ferrous    sulfate Liquid 300 milliGRAM(s) Enteral Tube daily  insulin glargine Injectable (LANTUS) 10 Unit(s) SubCutaneous every morning  insulin glargine Injectable (LANTUS) 10 Unit(s) SubCutaneous at bedtime  insulin lispro (HumaLOG) corrective regimen sliding scale   SubCutaneous every 6 hours  methylPREDNISolone sodium succinate Injectable 60 milliGRAM(s) IV Push two times a day  montelukast 10 milliGRAM(s) Oral daily  pantoprazole   Suspension 40 milliGRAM(s) Oral daily  polyethylene glycol 3350 17 Gram(s) Oral daily  senna 2 Tablet(s) Oral daily  sodium chloride 0.9% Bolus 500 milliLiter(s) IV Bolus once    MEDICATIONS  (PRN):  acetaminophen    Suspension .. 650 milliGRAM(s) Oral every 6 hours PRN Temp greater or equal to 38C (100.4F)  dextrose 40% Gel 15 Gram(s) Oral once PRN Blood Glucose LESS THAN 70 milliGRAM(s)/deciliter  glucagon  Injectable 1 milliGRAM(s) IntraMuscular once PRN Glucose LESS THAN 70 milligrams/deciliter  LORazepam   Injectable 0.5 milliGRAM(s) IV Push every 4 hours PRN tachypnea  midodrine 10 milliGRAM(s) Oral every 8 hours PRN for sBP of 100 or less  morphine  - Injectable 2 milliGRAM(s) IV Push every 4 hours PRN Severe Pain (7 - 10)      Allergies    codeine (Hives)    Intolerances          LABS:                          9.1    18.26 )-----------( 538      ( 09 May 2020 06:18 )             30.6     05-09    144  |  102  |  55.0<H>  ----------------------------<  162<H>  4.9   |  21.0<L>  |  0.98    Ca    9.0      09 May 2020 06:18  Phos  3.5     05-09  Mg     3.2     05-09            RADIOLOGY & ADDITIONAL TESTS:

## 2020-05-10 LAB
ANION GAP SERPL CALC-SCNC: 16 MMOL/L — SIGNIFICANT CHANGE UP (ref 5–17)
BUN SERPL-MCNC: 46 MG/DL — HIGH (ref 8–20)
CALCIUM SERPL-MCNC: 8.9 MG/DL — SIGNIFICANT CHANGE UP (ref 8.6–10.2)
CHLORIDE SERPL-SCNC: 103 MMOL/L — SIGNIFICANT CHANGE UP (ref 98–107)
CO2 SERPL-SCNC: 24 MMOL/L — SIGNIFICANT CHANGE UP (ref 22–29)
CREAT SERPL-MCNC: 0.78 MG/DL — SIGNIFICANT CHANGE UP (ref 0.5–1.3)
GLUCOSE BLDC GLUCOMTR-MCNC: 187 MG/DL — HIGH (ref 70–99)
GLUCOSE BLDC GLUCOMTR-MCNC: 194 MG/DL — HIGH (ref 70–99)
GLUCOSE BLDC GLUCOMTR-MCNC: 203 MG/DL — HIGH (ref 70–99)
GLUCOSE BLDC GLUCOMTR-MCNC: 213 MG/DL — HIGH (ref 70–99)
GLUCOSE BLDC GLUCOMTR-MCNC: 221 MG/DL — HIGH (ref 70–99)
GLUCOSE SERPL-MCNC: 199 MG/DL — HIGH (ref 70–99)
HCT VFR BLD CALC: 33.3 % — LOW (ref 34.5–45)
HGB BLD-MCNC: 9.9 G/DL — LOW (ref 11.5–15.5)
MAGNESIUM SERPL-MCNC: 3.1 MG/DL — HIGH (ref 1.6–2.6)
MCHC RBC-ENTMCNC: 27.7 PG — SIGNIFICANT CHANGE UP (ref 27–34)
MCHC RBC-ENTMCNC: 29.7 GM/DL — LOW (ref 32–36)
MCV RBC AUTO: 93 FL — SIGNIFICANT CHANGE UP (ref 80–100)
PHOSPHATE SERPL-MCNC: 4.2 MG/DL — SIGNIFICANT CHANGE UP (ref 2.4–4.7)
PLATELET # BLD AUTO: 384 K/UL — SIGNIFICANT CHANGE UP (ref 150–400)
POTASSIUM SERPL-MCNC: 5.3 MMOL/L — SIGNIFICANT CHANGE UP (ref 3.5–5.3)
POTASSIUM SERPL-SCNC: 5.3 MMOL/L — SIGNIFICANT CHANGE UP (ref 3.5–5.3)
RBC # BLD: 3.58 M/UL — LOW (ref 3.8–5.2)
RBC # FLD: 14.7 % — HIGH (ref 10.3–14.5)
SODIUM SERPL-SCNC: 143 MMOL/L — SIGNIFICANT CHANGE UP (ref 135–145)
WBC # BLD: 12.64 K/UL — HIGH (ref 3.8–10.5)
WBC # FLD AUTO: 12.64 K/UL — HIGH (ref 3.8–10.5)

## 2020-05-10 PROCEDURE — 99233 SBSQ HOSP IP/OBS HIGH 50: CPT | Mod: CS

## 2020-05-10 RX ADMIN — CEFEPIME 100 MILLIGRAM(S): 1 INJECTION, POWDER, FOR SOLUTION INTRAMUSCULAR; INTRAVENOUS at 17:06

## 2020-05-10 RX ADMIN — Medication 4: at 11:27

## 2020-05-10 RX ADMIN — MONTELUKAST 10 MILLIGRAM(S): 4 TABLET, CHEWABLE ORAL at 11:51

## 2020-05-10 RX ADMIN — Medication 1 DROP(S): at 22:00

## 2020-05-10 RX ADMIN — CHLORHEXIDINE GLUCONATE 15 MILLILITER(S): 213 SOLUTION TOPICAL at 05:22

## 2020-05-10 RX ADMIN — POLYETHYLENE GLYCOL 3350 17 GRAM(S): 17 POWDER, FOR SOLUTION ORAL at 11:56

## 2020-05-10 RX ADMIN — ENOXAPARIN SODIUM 60 MILLIGRAM(S): 100 INJECTION SUBCUTANEOUS at 05:22

## 2020-05-10 RX ADMIN — Medication 300 MILLIGRAM(S): at 11:51

## 2020-05-10 RX ADMIN — Medication 4: at 05:22

## 2020-05-10 RX ADMIN — Medication 1 DROP(S): at 05:21

## 2020-05-10 RX ADMIN — PANTOPRAZOLE SODIUM 40 MILLIGRAM(S): 20 TABLET, DELAYED RELEASE ORAL at 11:51

## 2020-05-10 RX ADMIN — Medication 1 TABLET(S): at 11:51

## 2020-05-10 RX ADMIN — CHLORHEXIDINE GLUCONATE 1 APPLICATION(S): 213 SOLUTION TOPICAL at 05:22

## 2020-05-10 RX ADMIN — INSULIN GLARGINE 10 UNIT(S): 100 INJECTION, SOLUTION SUBCUTANEOUS at 07:54

## 2020-05-10 RX ADMIN — FENTANYL CITRATE 1 PATCH: 50 INJECTION INTRAVENOUS at 08:37

## 2020-05-10 RX ADMIN — FENTANYL CITRATE 1 PATCH: 50 INJECTION INTRAVENOUS at 22:04

## 2020-05-10 RX ADMIN — Medication 500 MILLIGRAM(S): at 11:55

## 2020-05-10 RX ADMIN — INSULIN GLARGINE 10 UNIT(S): 100 INJECTION, SOLUTION SUBCUTANEOUS at 22:00

## 2020-05-10 RX ADMIN — Medication 60 MILLIGRAM(S): at 05:23

## 2020-05-10 RX ADMIN — Medication 2: at 22:00

## 2020-05-10 RX ADMIN — FENTANYL CITRATE 1 PATCH: 50 INJECTION INTRAVENOUS at 19:56

## 2020-05-10 RX ADMIN — CEFEPIME 100 MILLIGRAM(S): 1 INJECTION, POWDER, FOR SOLUTION INTRAMUSCULAR; INTRAVENOUS at 05:22

## 2020-05-10 RX ADMIN — Medication 2: at 17:07

## 2020-05-10 RX ADMIN — ENOXAPARIN SODIUM 60 MILLIGRAM(S): 100 INJECTION SUBCUTANEOUS at 17:07

## 2020-05-10 RX ADMIN — CHLORHEXIDINE GLUCONATE 15 MILLILITER(S): 213 SOLUTION TOPICAL at 17:08

## 2020-05-10 RX ADMIN — Medication 1 DROP(S): at 14:35

## 2020-05-10 RX ADMIN — SENNA PLUS 2 TABLET(S): 8.6 TABLET ORAL at 11:52

## 2020-05-10 RX ADMIN — FENTANYL CITRATE 1 PATCH: 50 INJECTION INTRAVENOUS at 21:56

## 2020-05-10 RX ADMIN — Medication 60 MILLIGRAM(S): at 17:07

## 2020-05-10 NOTE — PROGRESS NOTE ADULT - SUBJECTIVE AND OBJECTIVE BOX
PULMONARY PROGRESS NOTE      MALENA BECKHAM-[id number]    Patient is a 74y old  Female who presents with a chief complaint of acute respiratory failure (09 May 2020 13:17)      INTERVAL HPI/OVERNIGHT EVENTS:74-year-old female with a history of frontal lobe dementia, stroke, functional quadriplegia, chronic tracheostomy, dysphasia, with chronic gastrostomy tube admitted on 4/27 with Covid 19 requiring mechanical ventilation. Patient transferred to [facility] for further weaning. Currently on full dose anticoagulation, steroids, cefepime. Unable to communicate    MEDICATIONS  (STANDING):  artificial  tears Solution 1 Drop(s) Both EYES three times a day  ascorbic acid 500 milliGRAM(s) Oral daily  calcium carbonate 1250 mG  + Vitamin D (OsCal 500 + D) 1 Tablet(s) Oral daily  cefepime   IVPB      cefepime   IVPB 2000 milliGRAM(s) IV Intermittent every 12 hours  chlorhexidine 0.12% Liquid 15 milliLiter(s) Oral Mucosa every 12 hours  chlorhexidine 4% Liquid 1 Application(s) Topical <User Schedule>  dextrose 5%. 1000 milliLiter(s) (50 mL/Hr) IV Continuous <Continuous>  dextrose 50% Injectable 12.5 Gram(s) IV Push once  dextrose 50% Injectable 25 Gram(s) IV Push once  dextrose 50% Injectable 25 Gram(s) IV Push once  enoxaparin Injectable 60 milliGRAM(s) SubCutaneous every 12 hours  fentaNYL   Patch  12 MICROgram(s)/Hr 1 Patch Transdermal every 72 hours  ferrous    sulfate Liquid 300 milliGRAM(s) Enteral Tube daily  insulin glargine Injectable (LANTUS) 10 Unit(s) SubCutaneous every morning  insulin glargine Injectable (LANTUS) 10 Unit(s) SubCutaneous at bedtime  insulin lispro (HumaLOG) corrective regimen sliding scale   SubCutaneous every 6 hours  methylPREDNISolone sodium succinate Injectable 60 milliGRAM(s) IV Push two times a day  montelukast 10 milliGRAM(s) Oral daily  pantoprazole   Suspension 40 milliGRAM(s) Oral daily  polyethylene glycol 3350 17 Gram(s) Oral daily  senna 2 Tablet(s) Oral daily  sodium chloride 0.9% Bolus 500 milliLiter(s) IV Bolus once      MEDICATIONS  (PRN):  acetaminophen    Suspension .. 650 milliGRAM(s) Oral every 6 hours PRN Temp greater or equal to 38C (100.4F)  dextrose 40% Gel 15 Gram(s) Oral once PRN Blood Glucose LESS THAN 70 milliGRAM(s)/deciliter  glucagon  Injectable 1 milliGRAM(s) IntraMuscular once PRN Glucose LESS THAN 70 milligrams/deciliter  LORazepam   Injectable 0.5 milliGRAM(s) IV Push every 4 hours PRN tachypnea  midodrine 10 milliGRAM(s) Oral every 8 hours PRN for sBP of 100 or less  morphine  - Injectable 2 milliGRAM(s) IV Push every 4 hours PRN Severe Pain (7 - 10)      Allergies    codeine (Hives)    Intolerances        PAST MEDICAL & SURGICAL HISTORY:  Quadriplegia  COVID-19 virus detected  Advanced dementia  DM (diabetes mellitus)  HTN (hypertension)  CVA (cerebral vascular accident)  Respiratory failure  Constipation  GERD (gastroesophageal reflux disease)  Hypertension  Respiratory failure  Diabetes mellitus  Aphasic stroke  Dementia of frontal lobe type  Feeding by G-tube  Tracheostomy tube present  Tracheostomy in place  Gastrostomy in place  Hx of appendectomy      SOCIAL HISTORY  Smoking History:       REVIEW OF SYSTEMS:  Unavailable      Vital Signs Last 24 Hrs  T(C): 36.5 (10 May 2020 08:17), Max: 37.1 (09 May 2020 20:45)  T(F): 97.7 (10 May 2020 08:17), Max: 98.7 (09 May 2020 20:45)  HR: 65 (10 May 2020 08:17) (65 - 102)  BP: 123/62 (10 May 2020 08:17) (107/56 - 123/62)  BP(mean): --  RR: 21 (10 May 2020 08:17) (20 - 21)  SpO2: 98% (10 May 2020 08:17) (93% - 100%)    Mechanical ventilation: A.c. 14/380/70%/+6    PHYSICAL EXAMINATION:    GENERAL: The patient is awake and alert in no apparent distress.     HEENT: Head is normocephalic and atraumatic.     NECK: Supple. Tracheostomy    LUNGS: Clear to auscultation without wheezing, rales or rhonchi; respirations unlabored    HEART: Regular rate and rhythm without murmur.    ABDOMEN: Soft, nontender, and nondistended.      EXTREMITIES: Without any cyanosis, clubbing, rash, lesions or edema.    NEUROLOGIC: unresponsive    LABS:                        9.9    12.64 )-----------( 384      ( 10 May 2020 06:43 )             33.3     05-10    143  |  103  |  46.0<H>  ----------------------------<  199<H>  5.3   |  24.0  |  0.78    Ca    8.9      10 May 2020 06:43  Phos  4.2     05-10  Mg     3.1     05-10                          MICROBIOLOGY:    RADIOLOGY & ADDITIONAL STUDIES:  < from: CT Angio Chest w/ IV Cont (05.08.20 @ 14:13) >     EXAM:  CT ANGIO CHEST (W)AW IC                          PROCEDURE DATE:  05/08/2020          INTERPRETATION:  CLINICAL INFORMATION: Covid positive, shortness of breath, rule out PE    COMPARISON: 07/21/2017.    PROCEDURE:   CT Angiography of the Chest.  51 ml of Omnipaque 350 was injected intravenously. 49 ml were discarded.  Sagittal and coronal reformats were performed as well as 3D (MIP) reconstructions.      FINDINGS:    There is good opacification of the pulmonary arteries, however the study is markedly limited by patient motion. There is no saddle PE.    LUNGS AND AIRWAYS: Tracheostomy tube.  There bilateral diffuse patchy opacities.    PLEURA: Trace bilateral pleural effusions with associated atelectasis.    MEDIASTINUM AND JHONNY: The proximal esophagus is dilated and air-filled, similar to prior.    VESSELS: Within normal limits.    HEART: Heart size is normal. Small bowel pericardial effusion.    CHEST WALL AND LOWER NECK: Within normal limits.    VISUALIZED UPPER ABDOMEN: Gastrostomy tube in place.    BONES: Mild degenerative changes of the spine.    IMPRESSION:    Pattern of GGO suggests infection including atypical pneumonia/viral infection from atypical agents including COVID-19 (C19V-1).    Limited study due to respiratory motion. No saddle PE.    Small bilateral pleural effusions and small pericardial effusion.                SANJAY IVERSON M.D., ATTENDING RADIOLOGIST  This document has been electronically signed. May  8 2020  2:45PM    < end of copied text >  All films reviewed on PACS

## 2020-05-10 NOTE — PROGRESS NOTE ADULT - ASSESSMENT
74 yr old female with frontal lobe dementia, stroke, diabetes mellitus, hypertension, functional quadriplegia, chronic respiratory failure on trach collar, s/p PEG was sent in for evaluation  of hypoxia and fever. She is COVID 19 positive and was admitted to ICU, trach changed to cuff and was placed on full mechanical ventilation. She was on pressors, subsequently discontinued and was transferred to medical floor on 5/6/20. Noted to have UTI and sputum cultures growing Pseudomonas. RRT was called on 5/7 for hypoxia, CTA chest was done, negative for pulmonary embolism. CT head was done for concern for altered mental status, negative for stroke. Neurology consulted for evaluation of possible seizure like activity, advised likely secondary to hypoxia and palliative care evaluation was recommended. Cefepime was started for pneumonia. Goals of care discussed with spouse, patient is full code. Palliative care was consulted. Pulmonary was consulted for ventilator management.

## 2020-05-10 NOTE — PROGRESS NOTE ADULT - SUBJECTIVE AND OBJECTIVE BOX
INTERVAL HPI/OVERNIGHT EVENTS:    CC:  acute hypoxic respiratory failure sec COVID 19 infection and Pseudomonas pneumonia, frontal lobe dementia, h/o stroke, diabetes mellitus, hypertension, functional quadriplegia    No overnight events, remains vent dependent, afebrile. Had BM yesterday.    Vital Signs Last 24 Hrs  T(C): 36.5 (10 May 2020 08:17), Max: 37.1 (09 May 2020 20:45)  T(F): 97.7 (10 May 2020 08:17), Max: 98.7 (09 May 2020 20:45)  HR: 101 (10 May 2020 10:00) (65 - 102)  BP: 123/62 (10 May 2020 08:17) (107/56 - 123/62)  BP(mean): --  RR: 21 (10 May 2020 08:17) (20 - 21)  SpO2: 99% (10 May 2020 10:00) (93% - 100%)    PHYSICAL EXAM:    GENERAL: vent dependent, non communicative  CHEST/LUNG: b/l air entry, coarse breath sounds  HEART: regular  ABDOMEN: soft, bs+  EXTREMITIES:  no edema, tenderness    MEDICATIONS  (STANDING):  artificial  tears Solution 1 Drop(s) Both EYES three times a day  ascorbic acid 500 milliGRAM(s) Oral daily  calcium carbonate 1250 mG  + Vitamin D (OsCal 500 + D) 1 Tablet(s) Oral daily  cefepime   IVPB      cefepime   IVPB 2000 milliGRAM(s) IV Intermittent every 12 hours  chlorhexidine 0.12% Liquid 15 milliLiter(s) Oral Mucosa every 12 hours  chlorhexidine 4% Liquid 1 Application(s) Topical <User Schedule>  dextrose 5%. 1000 milliLiter(s) (50 mL/Hr) IV Continuous <Continuous>  dextrose 50% Injectable 12.5 Gram(s) IV Push once  dextrose 50% Injectable 25 Gram(s) IV Push once  dextrose 50% Injectable 25 Gram(s) IV Push once  enoxaparin Injectable 60 milliGRAM(s) SubCutaneous every 12 hours  fentaNYL   Patch  12 MICROgram(s)/Hr 1 Patch Transdermal every 72 hours  ferrous    sulfate Liquid 300 milliGRAM(s) Enteral Tube daily  insulin glargine Injectable (LANTUS) 10 Unit(s) SubCutaneous every morning  insulin glargine Injectable (LANTUS) 10 Unit(s) SubCutaneous at bedtime  insulin lispro (HumaLOG) corrective regimen sliding scale   SubCutaneous every 6 hours  methylPREDNISolone sodium succinate Injectable 60 milliGRAM(s) IV Push two times a day  montelukast 10 milliGRAM(s) Oral daily  pantoprazole   Suspension 40 milliGRAM(s) Oral daily  polyethylene glycol 3350 17 Gram(s) Oral daily  senna 2 Tablet(s) Oral daily  sodium chloride 0.9% Bolus 500 milliLiter(s) IV Bolus once    MEDICATIONS  (PRN):  acetaminophen    Suspension .. 650 milliGRAM(s) Oral every 6 hours PRN Temp greater or equal to 38C (100.4F)  dextrose 40% Gel 15 Gram(s) Oral once PRN Blood Glucose LESS THAN 70 milliGRAM(s)/deciliter  glucagon  Injectable 1 milliGRAM(s) IntraMuscular once PRN Glucose LESS THAN 70 milligrams/deciliter  LORazepam   Injectable 0.5 milliGRAM(s) IV Push every 4 hours PRN tachypnea  midodrine 10 milliGRAM(s) Oral every 8 hours PRN for sBP of 100 or less  morphine  - Injectable 2 milliGRAM(s) IV Push every 4 hours PRN Severe Pain (7 - 10)      Allergies    codeine (Hives)    Intolerances          LABS:                          9.9    12.64 )-----------( 384      ( 10 May 2020 06:43 )             33.3     05-10    143  |  103  |  46.0<H>  ----------------------------<  199<H>  5.3   |  24.0  |  0.78    Ca    8.9      10 May 2020 06:43  Phos  4.2     05-10  Mg     3.1     05-10            RADIOLOGY & ADDITIONAL TESTS:

## 2020-05-10 NOTE — PROGRESS NOTE ADULT - ASSESSMENT
79-year-old female with a history of dementia, CVA, chronic tracheostomy secondary to respiratory failure, and mental status, seen today status post transfer from the ICU on mechanical ventilation for further weaning. Lung involvement from virus significant on CAT scan. No evidence of pulmonary embolus. Prognosis is terminal.    Plan:  #1 consider DNR/DNI and possible terminal wean  #2 complete antibiotic for possible ventilator associated pneumonia  #3 to feed

## 2020-05-10 NOTE — PROGRESS NOTE ADULT - PROBLEM SELECTOR PLAN 1
Sec COVID 19 infection and bacterial pneumonia, continue ventilator support.  Continue Cefepime. Pulmonary note appreciated.

## 2020-05-10 NOTE — PROGRESS NOTE ADULT - ATTENDING COMMENTS
Patient was evaluated with a total encounter of greater than 35 minutes with full hospitalization and mechanical ventilation review.

## 2020-05-11 LAB
GLUCOSE BLDC GLUCOMTR-MCNC: 148 MG/DL — HIGH (ref 70–99)
GLUCOSE BLDC GLUCOMTR-MCNC: 165 MG/DL — HIGH (ref 70–99)
GLUCOSE BLDC GLUCOMTR-MCNC: 191 MG/DL — HIGH (ref 70–99)
GLUCOSE BLDC GLUCOMTR-MCNC: 199 MG/DL — HIGH (ref 70–99)

## 2020-05-11 PROCEDURE — 99232 SBSQ HOSP IP/OBS MODERATE 35: CPT | Mod: CS

## 2020-05-11 PROCEDURE — 93970 EXTREMITY STUDY: CPT | Mod: 26,CS

## 2020-05-11 PROCEDURE — 99231 SBSQ HOSP IP/OBS SF/LOW 25: CPT | Mod: CS

## 2020-05-11 PROCEDURE — 93925 LOWER EXTREMITY STUDY: CPT | Mod: 26,CS

## 2020-05-11 PROCEDURE — 99233 SBSQ HOSP IP/OBS HIGH 50: CPT | Mod: CS

## 2020-05-11 RX ORDER — FENTANYL CITRATE 50 UG/ML
1 INJECTION INTRAVENOUS
Refills: 0 | Status: COMPLETED | OUTPATIENT
Start: 2020-05-11 | End: 2020-05-18

## 2020-05-11 RX ADMIN — FENTANYL CITRATE 1 PATCH: 50 INJECTION INTRAVENOUS at 19:46

## 2020-05-11 RX ADMIN — CHLORHEXIDINE GLUCONATE 15 MILLILITER(S): 213 SOLUTION TOPICAL at 05:50

## 2020-05-11 RX ADMIN — INSULIN GLARGINE 10 UNIT(S): 100 INJECTION, SOLUTION SUBCUTANEOUS at 23:14

## 2020-05-11 RX ADMIN — Medication 2: at 23:14

## 2020-05-11 RX ADMIN — Medication 300 MILLIGRAM(S): at 11:54

## 2020-05-11 RX ADMIN — Medication 2: at 05:56

## 2020-05-11 RX ADMIN — Medication 2: at 17:40

## 2020-05-11 RX ADMIN — SENNA PLUS 2 TABLET(S): 8.6 TABLET ORAL at 11:54

## 2020-05-11 RX ADMIN — POLYETHYLENE GLYCOL 3350 17 GRAM(S): 17 POWDER, FOR SOLUTION ORAL at 13:54

## 2020-05-11 RX ADMIN — Medication 60 MILLIGRAM(S): at 05:51

## 2020-05-11 RX ADMIN — CHLORHEXIDINE GLUCONATE 15 MILLILITER(S): 213 SOLUTION TOPICAL at 17:42

## 2020-05-11 RX ADMIN — PANTOPRAZOLE SODIUM 40 MILLIGRAM(S): 20 TABLET, DELAYED RELEASE ORAL at 11:54

## 2020-05-11 RX ADMIN — INSULIN GLARGINE 10 UNIT(S): 100 INJECTION, SOLUTION SUBCUTANEOUS at 07:35

## 2020-05-11 RX ADMIN — CEFEPIME 100 MILLIGRAM(S): 1 INJECTION, POWDER, FOR SOLUTION INTRAMUSCULAR; INTRAVENOUS at 17:40

## 2020-05-11 RX ADMIN — Medication 1 DROP(S): at 23:12

## 2020-05-11 RX ADMIN — FENTANYL CITRATE 1 PATCH: 50 INJECTION INTRAVENOUS at 05:50

## 2020-05-11 RX ADMIN — ENOXAPARIN SODIUM 60 MILLIGRAM(S): 100 INJECTION SUBCUTANEOUS at 05:51

## 2020-05-11 RX ADMIN — MONTELUKAST 10 MILLIGRAM(S): 4 TABLET, CHEWABLE ORAL at 11:54

## 2020-05-11 RX ADMIN — ENOXAPARIN SODIUM 60 MILLIGRAM(S): 100 INJECTION SUBCUTANEOUS at 17:40

## 2020-05-11 RX ADMIN — Medication 500 MILLIGRAM(S): at 11:55

## 2020-05-11 RX ADMIN — CEFEPIME 100 MILLIGRAM(S): 1 INJECTION, POWDER, FOR SOLUTION INTRAMUSCULAR; INTRAVENOUS at 05:50

## 2020-05-11 RX ADMIN — CHLORHEXIDINE GLUCONATE 1 APPLICATION(S): 213 SOLUTION TOPICAL at 05:50

## 2020-05-11 RX ADMIN — Medication 1 DROP(S): at 05:50

## 2020-05-11 RX ADMIN — Medication 1 TABLET(S): at 11:57

## 2020-05-11 NOTE — CONSULT NOTE ADULT - SUBJECTIVE AND OBJECTIVE BOX
VASCULAR SURGERY CONSULT    Consulting surgical team: Vascular Surgery  Consulting attending:  Patient seen and examined: 05-11-20 @ 15:52    HPI:  73 yo female with hx of frontal lobe dementia, stroke, functional quadriplegia, tracheostomy normally on trach collar, dysphagia s/p PEG, nursing home patient, presents to the ED with fever and worsening hypoxemia.  She tested positive for COVID19 in the nursing home.  In ED, tracheostomy switched to a cuffed trach, patient placed on mechanical ventilation, 65% fio2. (04 May 2020 18:44)    Vascular Surgery consulted today because LLE is colder than RLE despite both arterial and venous US bilateral are normal studies.   Patient examined at bedside, patient in trache to ventilator, RASS -1.       PAST MEDICAL HISTORY:  Quadriplegia  COVID-19 virus detected  Advanced dementia  DM (diabetes mellitus)  HTN (hypertension)  CVA (cerebral vascular accident)  Respiratory failure  Constipation  GERD (gastroesophageal reflux disease)  Hypertension  Respiratory failure  Diabetes mellitus  Aphasic stroke  Dementia of frontal lobe type      PAST SURGICAL HISTORY:  Feeding by G-tube  Tracheostomy tube present  Tracheostomy in place  Gastrostomy in place  Hx of appendectomy      ALLERGIES:  codeine (Hives)      MEDICATIONS  (STANDING):  artificial  tears Solution 1 Drop(s) Both EYES three times a day  ascorbic acid 500 milliGRAM(s) Oral daily  calcium carbonate 1250 mG  + Vitamin D (OsCal 500 + D) 1 Tablet(s) Oral daily  cefepime   IVPB      cefepime   IVPB 2000 milliGRAM(s) IV Intermittent every 12 hours  chlorhexidine 0.12% Liquid 15 milliLiter(s) Oral Mucosa every 12 hours  chlorhexidine 4% Liquid 1 Application(s) Topical <User Schedule>  dextrose 5%. 1000 milliLiter(s) (50 mL/Hr) IV Continuous <Continuous>  dextrose 50% Injectable 12.5 Gram(s) IV Push once  dextrose 50% Injectable 25 Gram(s) IV Push once  dextrose 50% Injectable 25 Gram(s) IV Push once  enoxaparin Injectable 60 milliGRAM(s) SubCutaneous every 12 hours  fentaNYL   Patch  12 MICROgram(s)/Hr 1 Patch Transdermal every 72 hours  ferrous    sulfate Liquid 300 milliGRAM(s) Enteral Tube daily  insulin glargine Injectable (LANTUS) 10 Unit(s) SubCutaneous every morning  insulin glargine Injectable (LANTUS) 10 Unit(s) SubCutaneous at bedtime  insulin lispro (HumaLOG) corrective regimen sliding scale   SubCutaneous every 6 hours  montelukast 10 milliGRAM(s) Oral daily  pantoprazole   Suspension 40 milliGRAM(s) Oral daily  polyethylene glycol 3350 17 Gram(s) Oral daily  senna 2 Tablet(s) Oral daily  sodium chloride 0.9% Bolus 500 milliLiter(s) IV Bolus once    MEDICATIONS  (PRN):  acetaminophen    Suspension .. 650 milliGRAM(s) Oral every 6 hours PRN Temp greater or equal to 38C (100.4F)  dextrose 40% Gel 15 Gram(s) Oral once PRN Blood Glucose LESS THAN 70 milliGRAM(s)/deciliter  glucagon  Injectable 1 milliGRAM(s) IntraMuscular once PRN Glucose LESS THAN 70 milligrams/deciliter  LORazepam   Injectable 0.5 milliGRAM(s) IV Push every 4 hours PRN tachypnea  midodrine 10 milliGRAM(s) Oral every 8 hours PRN for sBP of 100 or less  morphine  - Injectable 2 milliGRAM(s) IV Push every 4 hours PRN Severe Pain (7 - 10)      VITALS & I/Os:  Vital Signs Last 24 Hrs  T(C): 36.7 (11 May 2020 08:15), Max: 36.7 (11 May 2020 08:15)  T(F): 98 (11 May 2020 08:15), Max: 98 (11 May 2020 08:15)  HR: 77 (11 May 2020 13:20) (66 - 80)  BP: 120/60 (11 May 2020 08:29) (120/60 - 122/72)  BP(mean): --  RR: 22 (11 May 2020 08:15) (20 - 22)  SpO2: 96% (11 May 2020 13:20) (95% - 100%)  CAPILLARY BLOOD GLUCOSE      POCT Blood Glucose.: 148 mg/dL (11 May 2020 11:07)  POCT Blood Glucose.: 165 mg/dL (11 May 2020 05:55)  POCT Blood Glucose.: 194 mg/dL (10 May 2020 21:58)  POCT Blood Glucose.: 187 mg/dL (10 May 2020 16:44)    Mode: AC/ CMV (Assist Control/ Continuous Mandatory Ventilation)  RR (machine): 14  TV (machine): 380  FiO2: 50  PEEP: 6  ITime: 0.8  MAP: 10  PIP: 20    I&O's Summary    10 May 2020 07:01  -  11 May 2020 07:00  --------------------------------------------------------  IN: 940 mL / OUT: 700 mL / NET: 240 mL    11 May 2020 07:01  -  11 May 2020 15:52  --------------------------------------------------------  IN: 710 mL / OUT: 0 mL / NET: 710 mL          GEN: NAD, RASS -1. trache to vent  HEENT: WNL  CHEST: Symmetrical chest rise, breath sounds CTAB  HEART: RRR, non-muffled heart sounds  ABD: Soft, non-tender, non-distended  EXT/VASC: Pulses palpable femoral biletaral, tibial bilateral, PPD bilateral. Doplerable bilateral (signs in skin). Poikilothermia in LLE at the level of the knee. LEFT leg is lateralized in the bed with no blanket over it, while the RIGHT leg is straight in the bed with a blanket on top.     LABS:                        9.9    12.64 )-----------( 384      ( 10 May 2020 06:43 )             33.3     05-10    143  |  103  |  46.0<H>  ----------------------------<  199<H>  5.3   |  24.0  |  0.78    Ca    8.9      10 May 2020 06:43  Phos  4.2     05-10  Mg     3.1     05-10                      IMAGING:    INTERPRETATION: US LOWER EXTREMITY ARTERIAL DUPLEX COMPLETE BILATERAL     HISTORY: COVID. Respiratory failure. Hypoxemia.     Real-time sonography of the bilateral lower extremity arterial system was   performed using a high-resolution linear array transducer and including   color and spectral Doppler.     The examination was technically limited secondary to difficulty with patient   positioning. No occlusion or hemodynamically significant stenosis is seen   within the visualized lower extremity arteries. Mild diffuse plaque is seen   within the visualized arteries. Peroneal artery is not seen bilaterally.     Flow phase patterns and peak systolic velocity measurements (in cm/s) were   observed as follows:     RIGHT:   CFA: Biphasic; 71   Proximal SFA: Biphasic; 63   Mid SFA:Biphasic; 65   Distal SFA: Biphasic; 73   Popliteal: Biphasic; 99   Anterior tibial : Biphasic; 58   Posterior tibial: Biphasic; 63   Peroneal: Not seen.   Dorsalis pedis: Biphasic; 68     LEFT:   CFA: Biphasic; 83   Proximal SFA: Biphasic; 84   Mid SFA:Biphasic; 48   Distal SFA: Biphasic; 55   Popliteal: Biphasic; 39   Anterior tibial : Biphasic; 39   Posterior tibial: Biphasic; 31   Peroneal: Not seen.   Dorsalis pedis: Biphasic; 24     IMPRESSION:     Peroneal arteries not seen bilaterally.     No occlusion or evidence of a hemodynamically significant stenosis in the   visualized lower extremity arteries bilaterally.          EXAM: US DPLX LWR EXT VEINS COMPL BI     PROCEDURE DATE: 05/11/2020         INTERPRETATION: CLINICAL INFORMATION: Elevated d-dimer. Respiratory failure   on ventilator. COVID.     COMPARISON: None available.     TECHNIQUE: Duplex sonography of the BILATERAL LOWER extremity veins with   color and spectral Doppler, with and without compression.     FINDINGS:     There is normal compressibility of the bilateral common femoral, femoral and   popliteal veins.     Doppler examination shows normal spontaneous and phasic flow.     No calf vein thrombosis is detected.     IMPRESSION:     No evidence of deep venous thrombosis in either lower extremity.

## 2020-05-11 NOTE — PROGRESS NOTE ADULT - ASSESSMENT
Respiratory failure  O2 requirements improved      Plan:  1.O2 at 50%>titrate to sat>94%  2.Begin SBT with CPAP/PSV

## 2020-05-11 NOTE — PROGRESS NOTE ADULT - PROBLEM SELECTOR PLAN 1
Sec COVID 19 infection and bacterial pneumonia, continue ventilator support.  Continue Cefepime to complete 10-14day course. Pulmonary note appreciated.

## 2020-05-11 NOTE — PROGRESS NOTE ADULT - ATTENDING COMMENTS
Patient seen and examined at bedside. Agree with above A/P. Cold left LE on exam  Continue Cefepime.  Wean down FiO2 as able.  ID and pulmonary follow up.  Bowel regimen.  US with no DVT or occlusion.  Will consult vascular surgery, as risk of microemboli with COVID 19 infection.  on AC, will adjust dosing pending vascular recommendations.  palliative care evaluation.  discussed with NP and RN.

## 2020-05-11 NOTE — PHARMACOTHERAPY INTERVENTION NOTE - COMMENTS
Referenced paperwork from NH. Patient was started on doxycycline 100 mG BID for 5 days on 5/1/2020
notified MD to renew

## 2020-05-11 NOTE — PROGRESS NOTE ADULT - SUBJECTIVE AND OBJECTIVE BOX
[facility]  INFECTIOUS DISEASES AND INTERNAL MEDICINE at [place]  =======================================================  Sid Mendoza MD  Diplomates American Board of Internal Medicine and Infectious Diseases  =======================================================    [facility]-[id number]  BREA BECKHAM     Follow up:  acute respiratory failure     Her eyes are open, looks better compare with last week even though noncommunicative. NAD, On vent trough trach with SO2 99%.     PAST MEDICAL & SURGICAL HISTORY:  Quadriplegia  COVID-19 virus detected  Advanced dementia  DM (diabetes mellitus)  HTN (hypertension)  CVA (cerebral vascular accident)  Respiratory failure  Constipation  GERD (gastroesophageal reflux disease)  Hypertension  Respiratory failure  Diabetes mellitus  Aphasic stroke  Dementia of frontal lobe type  Feeding by G-tube  Tracheostomy tube present  Tracheostomy in place  Gastrostomy in place  Hx of appendectomy    Social Hx: no smoking    FAMILY HISTORY:  No pertinent family history in first degree relatives    Allergies  codeine (Hives)    Antibiotics:   cefepime   IVPB 2000 milliGRAM(s) IV Intermittent once     REVIEW OF SYSTEMS:  Nonverbal     Physical Exam:  Vital Signs Last 24 Hrs  T(C): 36.7 (11 May 2020 08:15), Max: 36.7 (10 May 2020 15:42)  T(F): 98 (11 May 2020 08:15), Max: 98 (10 May 2020 15:42)  HR: 80 (11 May 2020 08:15) (66 - 80)  BP: 120/60 (11 May 2020 08:29) (119/71 - 122/72)  BP(mean): --  RR: 22 (11 May 2020 08:15) (20 - 22)  SpO2: 95% (11 May 2020 08:15) (95% - 100%)  GEN: NAD  HEENT: normocephalic and atraumatic.   NECK: Supple.  No lymphadenopathy   LUNGS: Clear to auscultation.  HEART: Regular rate and rhythm   ABDOMEN: Soft, nontender, and nondistended.  Positive bowel sounds. +PEG  EXTREMITIES: Without edema.  NEUROLOGIC: Contracted all extremities   PSYCHIATRIC: Awake but not communicative   SKIN: No ulceration or induration present.    Labs:  05-10    143  |  103  |  46.0<H>  ----------------------------<  199<H>  5.3   |  24.0  |  0.78    Ca    8.9      10 May 2020 06:43  Phos  4.2     05-10  Mg     3.1     05-10                        9.9    12.64 )-----------( 384      ( 10 May 2020 06:43 )             33.3     RECENT CULTURES:  05-05 @ 17:02 .Sputum Sputum Pseudomonas aeruginosa    Few Pseudomonas aeruginosa  Normal Respiratory Elvira present    Numerous polymorphonuclear leukocytes seen per low power field  Few Squamous epithelial cells seen per low power field  Moderate Gram Positive Rods seen per oil power field  Few Gram Negative Rods seen per oil power field  Few Gram positive cocci in pairs, chains and clusters seen per oil power  field    05-04 @ 21:22 .Urine Clean Catch (Midstream) Proteus mirabilis    >100,000 CFU/ml Proteus mirabilis    05-04 @ 14:01 .Blood Blood-Peripheral     No growth at 5 days.    All imaging and other data have been reviewed.  < from: CT Angio Chest w/ IV Cont (05.08.20 @ 14:13) >  EXAM:  CT ANGIO CHEST (W)AW IC                        PROCEDURE DATE:  05/08/2020    INTERPRETATION:  CLINICAL INFORMATION: Covid positive, shortness of breath, rule out PE  COMPARISON: 07/21/2017.  PROCEDURE:   CT Angiography of the Chest.  51 ml of Omnipaque 350 was injected intravenously. 49 ml were discarded.  Sagittal and coronal reformats were performed as well as 3D (MIP) reconstructions.  FINDINGS:  There is good opacification of the pulmonary arteries, however the study is markedly limited by patient motion. There is no saddle PE.  LUNGS AND AIRWAYS: Tracheostomy tube.  There bilateral diffuse patchy opacities.  PLEURA: Trace bilateral pleural effusions with associated atelectasis.  MEDIASTINUM AND JHONNY: The proximal esophagus is dilated and air-filled, similar to prior.  VESSELS: Within normal limits.  HEART: Heart size is normal. Small bowel pericardial effusion.  CHEST WALL AND LOWER NECK: Within normal limits.  VISUALIZED UPPER ABDOMEN: Gastrostomy tube in place.  BONES: Mild degenerative changes of the spine.  IMPRESSION:  Pattern of GGO suggests infection including atypical pneumonia/viral infection from atypical agents including COVID-19 (C19V-1).  Limited study due to respiratory motion. No saddle PE.  Small bilateral pleural effusions and small pericardial effusion.    Assessment and Plan:   73 y/o woman with with hx of frontal lobe dementia, stroke, functional quadriplegia, tracheostomy normally on trach collar, dysphagia s/p PEG, nursing home patient was admitted on 5/4 with with fever and worsening hypoxemia. She tested positive for COVID19 in the nursing home. Sputum culture showed pseudomonas and urine proteus so ID was called for recommendations. Patient is awake but not communicative. Due to worsening of respiration and higher FIO2 requirement and also possible seizure, CTA and head CT were done.    COVID 19 + infection  Viral pneumonia  Acute hypoxic respiratory failure  Bacterial pneumonia  UTI  Stroke  Chronic trach and PEG    - Airborne and contact isolation   - COVID 19 PCR positive   - CXR with multifocal opacities   - Procalcitonin 30.47  - CRP=36.32, Onqxxilb=834 and XYF=784  - Blood culture negative  - Sputum culture with Pseudomonas pansensitive   - UA negative but urine culture with proteus that could be contamination   - Continue Cefepime 2gm q12 to cover sputum and urine both   - Will treat for 10-14 days.     Will follow.

## 2020-05-11 NOTE — PROGRESS NOTE ADULT - SUBJECTIVE AND OBJECTIVE BOX
CC: acute respiratory failure (11 May 2020 11:08)    HPI:  75 yo female with hx of frontal lobe dementia, stroke, functional quadriplegia, tracheostomy normally on trach collar, dysphagia s/p PEG, nursing home patient, presents to the ED with fever and worsening hypoxemia.  She tested positive for COVID19 in the nursing home.  In ED, tracheostomy switched to a cuffed trach, patient placed on mechanical ventilation, 65% fio2. (04 May 2020 18:44)    INTERVAL HPI/OVERNIGHT EVENTS: Patient seen and examined lying in bed.  Patient alert but nonverbal.  Unable to answer ROS.  Patient appears comfortable.    Vital Signs Last 24 Hrs  T(C): 36.7 (11 May 2020 08:15), Max: 36.7 (10 May 2020 15:42)  T(F): 98 (11 May 2020 08:15), Max: 98 (10 May 2020 15:42)  HR: 69 (11 May 2020 10:18) (66 - 80)  BP: 120/60 (11 May 2020 08:29) (119/71 - 122/72)  BP(mean): --  RR: 22 (11 May 2020 08:15) (20 - 22)  SpO2: 99% (11 May 2020 10:18) (95% - 100%)  I&O's Detail    10 May 2020 07:01  -  11 May 2020 07:00  --------------------------------------------------------  IN:    Enteral Tube Flush: 500 mL    Glucerna 1.5: 390 mL    Solution: 50 mL  Total IN: 940 mL    OUT:    Indwelling Catheter - Urethral: 700 mL  Total OUT: 700 mL    Total NET: 240 mL      11 May 2020 07:01  -  11 May 2020 12:13  --------------------------------------------------------  IN:    Glucerna 1.5: 60 mL  Total IN: 60 mL    OUT:  Total OUT: 0 mL    Total NET: 60 mL      PHYSICAL EXAM:  GENERAL: NAD  HEAD:  Atraumatic, Normocephalic  NECK: Supple, No JVD, Normal thyroid; Trach  NERVOUS SYSTEM:  Alert, nonverbal, generalized weakness   CHEST/LUNG: Bilateral air entry  HEART: Regular rate and rhythm; No murmurs, rubs, or gallops  ABDOMEN: Soft, Nontender, Nondistended; Bowel sounds present; (+) peg  EXTREMITIES:  2+ Peripheral Pulses, LLE with coolness                          9.9    12.64 )-----------( 384      ( 10 May 2020 06:43 )             33.3     10 May 2020 06:43    143    |  103    |  46.0   ----------------------------<  199    5.3     |  24.0   |  0.78     Ca    8.9        10 May 2020 06:43  Phos  4.2       10 May 2020 06:43  Mg     3.1       10 May 2020 06:43        CAPILLARY BLOOD GLUCOSE  POCT Blood Glucose.: 148 mg/dL (11 May 2020 11:07)  POCT Blood Glucose.: 165 mg/dL (11 May 2020 05:55)  POCT Blood Glucose.: 194 mg/dL (10 May 2020 21:58)  POCT Blood Glucose.: 187 mg/dL (10 May 2020 16:44)          MEDICATIONS  (STANDING):  artificial  tears Solution 1 Drop(s) Both EYES three times a day  ascorbic acid 500 milliGRAM(s) Oral daily  calcium carbonate 1250 mG  + Vitamin D (OsCal 500 + D) 1 Tablet(s) Oral daily  cefepime   IVPB      cefepime   IVPB 2000 milliGRAM(s) IV Intermittent every 12 hours  chlorhexidine 0.12% Liquid 15 milliLiter(s) Oral Mucosa every 12 hours  chlorhexidine 4% Liquid 1 Application(s) Topical <User Schedule>  dextrose 5%. 1000 milliLiter(s) (50 mL/Hr) IV Continuous <Continuous>  dextrose 50% Injectable 12.5 Gram(s) IV Push once  dextrose 50% Injectable 25 Gram(s) IV Push once  dextrose 50% Injectable 25 Gram(s) IV Push once  enoxaparin Injectable 60 milliGRAM(s) SubCutaneous every 12 hours  fentaNYL   Patch  12 MICROgram(s)/Hr 1 Patch Transdermal every 72 hours  ferrous    sulfate Liquid 300 milliGRAM(s) Enteral Tube daily  insulin glargine Injectable (LANTUS) 10 Unit(s) SubCutaneous every morning  insulin glargine Injectable (LANTUS) 10 Unit(s) SubCutaneous at bedtime  insulin lispro (HumaLOG) corrective regimen sliding scale   SubCutaneous every 6 hours  montelukast 10 milliGRAM(s) Oral daily  pantoprazole   Suspension 40 milliGRAM(s) Oral daily  polyethylene glycol 3350 17 Gram(s) Oral daily  senna 2 Tablet(s) Oral daily  sodium chloride 0.9% Bolus 500 milliLiter(s) IV Bolus once    MEDICATIONS  (PRN):  acetaminophen    Suspension .. 650 milliGRAM(s) Oral every 6 hours PRN Temp greater or equal to 38C (100.4F)  dextrose 40% Gel 15 Gram(s) Oral once PRN Blood Glucose LESS THAN 70 milliGRAM(s)/deciliter  glucagon  Injectable 1 milliGRAM(s) IntraMuscular once PRN Glucose LESS THAN 70 milligrams/deciliter  LORazepam   Injectable 0.5 milliGRAM(s) IV Push every 4 hours PRN tachypnea  midodrine 10 milliGRAM(s) Oral every 8 hours PRN for sBP of 100 or less  morphine  - Injectable 2 milliGRAM(s) IV Push every 4 hours PRN Severe Pain (7 - 10)      RADIOLOGY & ADDITIONAL TESTS:  < from: US Duplex Arterial Lower Ext Compl, Bilateral (05.11.20 @ 11:25) >   EXAM:  US DPLX LWR EXT ARTS COMPL BI                          PROCEDURE DATE:  05/11/2020          INTERPRETATION:  US LOWER EXTREMITY ARTERIAL DUPLEX COMPLETE BILATERAL    HISTORY:  COVID. Respiratory failure. Hypoxemia.    Real-time sonography of the bilateral lower extremity arterial system was performed using a high-resolution linear array transducer and including color and spectral Doppler.     The examination was technically limited secondary to difficulty with patient positioning. No occlusion or hemodynamically significant stenosis is seen within the visualized lower extremity arteries. Mild diffuse plaque is seen within the visualized arteries. Peroneal artery is not seen bilaterally.    Flow phase patterns and peak systolic velocity measurements (in cm/s) were observed as follows:    RIGHT:  CFA:  Biphasic; 71   Proximal SFA: Biphasic; 63   Mid SFA:Biphasic; 65   Distal SFA:  Biphasic;  73   Popliteal:  Biphasic;  99   Anterior tibial :  Biphasic; 58   Posterior tibial: Biphasic; 63   Peroneal: Not seen.  Dorsalis pedis: Biphasic;  68     LEFT:  CFA:  Biphasic; 83   Proximal SFA: Biphasic; 84   Mid SFA:Biphasic; 48   Distal SFA:  Biphasic; 55   Popliteal:  Biphasic;  39   Anterior tibial :  Biphasic; 39   Posterior tibial: Biphasic; 31   Peroneal: Not seen.  Dorsalis pedis:  Biphasic;  24     IMPRESSION:    Peroneal arteries not seen bilaterally.    No occlusion or evidence of a hemodynamically significant stenosis in the visualized lower extremity arteries bilaterally.                JUAN TERRY M.D., ATTENDING RADIOLOGIST  This document has been electronically signed. May 11 2020 12:03PM              < end of copied text >    < from: US Duplex Venous Lower Ext Complete, Bilateral (05.11.20 @ 11:11) >   EXAM:  US DPLX LWR EXT VEINS COMPL BI                          PROCEDURE DATE:  05/11/2020          INTERPRETATION:  CLINICAL INFORMATION: Elevated d-dimer. Respiratory failure on ventilator. COVID.    COMPARISON: None available.    TECHNIQUE: Duplex sonography of the BILATERAL LOWER extremity veins with color and spectral Doppler, with and without compression.      FINDINGS:    There is normal compressibility of the bilateral common femoral, femoral and popliteal veins.     Doppler examination shows normal spontaneous and phasic flow.    No calf vein thrombosis is detected.    IMPRESSION:     No evidence of deep venous thrombosis in either lower extremity.                      JUAN TERRY M.D., ATTENDING RADIOLOGIST  This document has been electronically signed. May 11 2020 11:13AM              < end of copied text >    < from: CT Angio Chest w/ IV Cont (05.08.20 @ 14:13) >   EXAM:  CT ANGIO CHEST (W)AW IC                          PROCEDURE DATE:  05/08/2020          INTERPRETATION:  CLINICAL INFORMATION: Covid positive, shortness of breath, rule out PE    COMPARISON: 07/21/2017.    PROCEDURE:   CT Angiography of the Chest.  51 ml of Omnipaque 350 was injected intravenously. 49 ml were discarded.  Sagittal and coronal reformats were performed as well as 3D (MIP) reconstructions.      FINDINGS:    There is good opacification of the pulmonary arteries, however the study is markedly limited by patient motion. There is no saddle PE.    LUNGS AND AIRWAYS: Tracheostomy tube.  There bilateral diffuse patchy opacities.    PLEURA: Trace bilateral pleural effusions with associated atelectasis.    MEDIASTINUM AND JHONNY: The proximal esophagus is dilated and air-filled, similar to prior.    VESSELS: Within normal limits.    HEART: Heart size is normal. Small bowel pericardial effusion.    CHEST WALL AND LOWER NECK: Within normal limits.    VISUALIZED UPPER ABDOMEN: Gastrostomy tube in place.    BONES: Mild degenerative changes of the spine.    IMPRESSION:    Pattern of GGO suggests infection including atypical pneumonia/viral infection from atypical agents including COVID-19 (C19V-1).    Limited study due to respiratory motion. No saddle PE.    Small bilateral pleural effusions and small pericardial effusion.                SANJAY IVERSON M.D., ATTENDING RADIOLOGIST  This document has been electronically signed. May  8 2020  2:45PM              < end of copied text >

## 2020-05-11 NOTE — CHART NOTE - NSCHARTNOTEFT_GEN_A_CORE
Source: Patient [ ]  Family [ ]   other [x ]    Current Diet: Diet, NPO with Tube Feed:   Tube Feeding Modality: Gastrostomy  Glucerna 1.5 Horacio  Total Volume for 24 Hours (mL): 720  Continuous  Starting Tube Feed Rate {mL per Hour}: 10  Increase Tube Feed Rate by (mL): 10     Every 2 hours  Until Goal Tube Feed Rate (mL per Hour): 30  Tube Feed Duration (in Hours): 24  Tube Feed Start Time: 19:00 (05-05-20 @ 04:47)    Enteral /Parenteral Nutrition: Glucerna 1.5 at goal rate of 30ml/hr (x20 hrs) providing 600ml, 900 kcal, 50g pro, 456 ml free water. Additional free water per MD discretion.     Current Weight:   (5/4) 129#  -Obtain current wt, continue to monitor. Generalized 1+ edema noted.     Pertinent Medications: MEDICATIONS  (STANDING):  artificial  tears Solution 1 Drop(s) Both EYES three times a day  ascorbic acid 500 milliGRAM(s) Oral daily  calcium carbonate 1250 mG  + Vitamin D (OsCal 500 + D) 1 Tablet(s) Oral daily  cefepime   IVPB      cefepime   IVPB 2000 milliGRAM(s) IV Intermittent every 12 hours  chlorhexidine 0.12% Liquid 15 milliLiter(s) Oral Mucosa every 12 hours  chlorhexidine 4% Liquid 1 Application(s) Topical <User Schedule>  dextrose 5%. 1000 milliLiter(s) (50 mL/Hr) IV Continuous <Continuous>  dextrose 50% Injectable 12.5 Gram(s) IV Push once  dextrose 50% Injectable 25 Gram(s) IV Push once  dextrose 50% Injectable 25 Gram(s) IV Push once  enoxaparin Injectable 60 milliGRAM(s) SubCutaneous every 12 hours  ferrous    sulfate Liquid 300 milliGRAM(s) Enteral Tube daily  insulin glargine Injectable (LANTUS) 10 Unit(s) SubCutaneous every morning  insulin glargine Injectable (LANTUS) 10 Unit(s) SubCutaneous at bedtime  insulin lispro (HumaLOG) corrective regimen sliding scale   SubCutaneous every 6 hours  montelukast 10 milliGRAM(s) Oral daily  pantoprazole   Suspension 40 milliGRAM(s) Oral daily  polyethylene glycol 3350 17 Gram(s) Oral daily  senna 2 Tablet(s) Oral daily  sodium chloride 0.9% Bolus 500 milliLiter(s) IV Bolus once    MEDICATIONS  (PRN):  acetaminophen    Suspension .. 650 milliGRAM(s) Oral every 6 hours PRN Temp greater or equal to 38C (100.4F)  dextrose 40% Gel 15 Gram(s) Oral once PRN Blood Glucose LESS THAN 70 milliGRAM(s)/deciliter  glucagon  Injectable 1 milliGRAM(s) IntraMuscular once PRN Glucose LESS THAN 70 milligrams/deciliter  LORazepam   Injectable 0.5 milliGRAM(s) IV Push every 4 hours PRN tachypnea  midodrine 10 milliGRAM(s) Oral every 8 hours PRN for sBP of 100 or less  morphine  - Injectable 2 milliGRAM(s) IV Push every 4 hours PRN Severe Pain (7 - 10)    Pertinent Labs: CBC Full  -  ( 10 May 2020 06:43 )  WBC Count : 12.64 K/uL  RBC Count : 3.58 M/uL  Hemoglobin : 9.9 g/dL  Hematocrit : 33.3 %  Platelet Count - Automated : 384 K/uL  Mean Cell Volume : 93.0 fl  Mean Cell Hemoglobin : 27.7 pg  Mean Cell Hemoglobin Concentration : 29.7 gm/dL  Auto Neutrophil # : x  Auto Lymphocyte # : x  Auto Monocyte # : x  Auto Eosinophil # : x  Auto Basophil # : x  Auto Neutrophil % : x  Auto Lymphocyte % : x  Auto Monocyte % : x  Auto Eosinophil % : x  Auto Basophil % : x    -No recent labs    Skin: IAD    Nutrition focused physical exam not conducted - found signs of malnutrition [ ]absent [ ]present    Subcutaneous fat loss: [ ] Orbital fat pads region, [ ]Buccal fat region, [ ]Triceps region,  [ ]Ribs region    Muscle wasting: [ ]Temples region, [ ]Clavicle region, [ ]Shoulder region, [ ]Scapula region, [ ]Interosseous region,  [ ]thigh region, [ ]Calf region    Estimated Needs:   [ x] no change since previous assessment  [ ] recalculated:     Current Nutrition Diagnosis: Pt remains at high nutrition risk secondary to Inadequate Energy Intake related to low TF rate in the setting of increased needs as evidenced by not meeting estimated needs for calories and protein. Pt vent dependent Trach. Pt receiving Glucerna 1.5 at 30ml/hr per I&Os. Last documented BM 5/10. RD to remain available.     Recommendations:   1) If medically feasible, increase TF to better meet pt needs- to Glucerna 1.5 by 10 ml q 8 hrs to goal of 50 ml /hr ( x 20 hrs) to provide 1000ml, 1500 kcal, 83g pro and 760ml free water. Additional free water per MD discretion.  2) RX: Liquid MVI and Vitamin C (500mg) daily  3) Monitor tolerance to TF  4) Monitor wts and labs    Monitoring and Evaluation:   [ ] PO intake [x ] Tolerance to diet prescription [X] Weights  [X] Follow up per protocol [X] Labs:

## 2020-05-11 NOTE — CONSULT NOTE ADULT - ASSESSMENT
Pt is a  74 F admitted for acute COVID 19 pneumonia on a patient with previous tracheostomy, not conected to vent.   Vascular Surgery is consulted today after both arterial and venous bilateral lower extremities studies normal, with concerns of poikilothermia in LLE which on physical exam is caused by RLE covered with a blanket but not LLE.     No surgical intervention at this point.   We will sign off.   Call with questions.     Thank you for the opportunity of taking care of your patient. Pt is a  74 F admitted for acute COVID 19 pneumonia on a patient with previous tracheostomy, not conected to vent.   Vascular Surgery is consulted today after both arterial and venous bilateral lower extremities studies normal, with concerns of poikilothermia in LLE which after physical exam is most probable caused by RLE covered with a blanket but not LLE. Pulses palpable and dopplerable bilaterally, not changes in skin or compartments.      No surgical intervention at this point.   We will sign off.   Call with questions.     Thank you for the opportunity of taking care of your patient.

## 2020-05-11 NOTE — PROGRESS NOTE ADULT - SUBJECTIVE AND OBJECTIVE BOX
PULMONARY PROGRESS NOTE      MALENA BECKHAM-[id number]    Patient is a 74y old  Female who presents with a chief complaint of acute respiratory failure (11 May 2020 12:12)      INTERVAL HPI/OVERNIGHT EVENTS:  Seen on 70%   Observed with 50%>sat maintained at 97-98%  No change in mental status/neurologic status    MEDICATIONS  (STANDING):  artificial  tears Solution 1 Drop(s) Both EYES three times a day  ascorbic acid 500 milliGRAM(s) Oral daily  calcium carbonate 1250 mG  + Vitamin D (OsCal 500 + D) 1 Tablet(s) Oral daily  cefepime   IVPB      cefepime   IVPB 2000 milliGRAM(s) IV Intermittent every 12 hours  chlorhexidine 0.12% Liquid 15 milliLiter(s) Oral Mucosa every 12 hours  chlorhexidine 4% Liquid 1 Application(s) Topical <User Schedule>  dextrose 5%. 1000 milliLiter(s) (50 mL/Hr) IV Continuous <Continuous>  dextrose 50% Injectable 12.5 Gram(s) IV Push once  dextrose 50% Injectable 25 Gram(s) IV Push once  dextrose 50% Injectable 25 Gram(s) IV Push once  enoxaparin Injectable 60 milliGRAM(s) SubCutaneous every 12 hours  fentaNYL   Patch  12 MICROgram(s)/Hr 1 Patch Transdermal every 72 hours  ferrous    sulfate Liquid 300 milliGRAM(s) Enteral Tube daily  insulin glargine Injectable (LANTUS) 10 Unit(s) SubCutaneous every morning  insulin glargine Injectable (LANTUS) 10 Unit(s) SubCutaneous at bedtime  insulin lispro (HumaLOG) corrective regimen sliding scale   SubCutaneous every 6 hours  montelukast 10 milliGRAM(s) Oral daily  pantoprazole   Suspension 40 milliGRAM(s) Oral daily  polyethylene glycol 3350 17 Gram(s) Oral daily  senna 2 Tablet(s) Oral daily  sodium chloride 0.9% Bolus 500 milliLiter(s) IV Bolus once      MEDICATIONS  (PRN):  acetaminophen    Suspension .. 650 milliGRAM(s) Oral every 6 hours PRN Temp greater or equal to 38C (100.4F)  dextrose 40% Gel 15 Gram(s) Oral once PRN Blood Glucose LESS THAN 70 milliGRAM(s)/deciliter  glucagon  Injectable 1 milliGRAM(s) IntraMuscular once PRN Glucose LESS THAN 70 milligrams/deciliter  LORazepam   Injectable 0.5 milliGRAM(s) IV Push every 4 hours PRN tachypnea  midodrine 10 milliGRAM(s) Oral every 8 hours PRN for sBP of 100 or less  morphine  - Injectable 2 milliGRAM(s) IV Push every 4 hours PRN Severe Pain (7 - 10)      Allergies    codeine (Hives)    Intolerances        PAST MEDICAL & SURGICAL HISTORY:  Quadriplegia  COVID-19 virus detected  Advanced dementia  DM (diabetes mellitus)  HTN (hypertension)  CVA (cerebral vascular accident)  Respiratory failure  Constipation  GERD (gastroesophageal reflux disease)  Hypertension  Respiratory failure  Diabetes mellitus  Aphasic stroke  Dementia of frontal lobe type  Feeding by G-tube  Tracheostomy tube present  Tracheostomy in place  Gastrostomy in place  Hx of appendectomy      SOCIAL HISTORY  Smoking History:       REVIEW OF SYSTEMS:Unobtainable    CONSTITUTIONAL:  No distress            Vital Signs Last 24 Hrs  T(C): 36.7 (11 May 2020 08:15), Max: 36.7 (10 May 2020 15:42)  T(F): 98 (11 May 2020 08:15), Max: 98 (10 May 2020 15:42)  HR: 77 (11 May 2020 13:20) (66 - 80)  BP: 120/60 (11 May 2020 08:29) (119/71 - 122/72)  BP(mean): --  RR: 22 (11 May 2020 08:15) (20 - 22)  SpO2: 96% (11 May 2020 13:20) (95% - 100%)    PHYSICAL EXAMINATION:      LABS:                        9.9    12.64 )-----------( 384      ( 10 May 2020 06:43 )             33.3     05-10    143  |  103  |  46.0<H>  ----------------------------<  199<H>  5.3   |  24.0  |  0.78    Ca    8.9      10 May 2020 06:43  Phos  4.2     05-10  Mg     3.1     05-10                          MICROBIOLOGY:    RADIOLOGY & ADDITIONAL STUDIES:

## 2020-05-12 LAB
ANION GAP SERPL CALC-SCNC: 13 MMOL/L — SIGNIFICANT CHANGE UP (ref 5–17)
BUN SERPL-MCNC: 32 MG/DL — HIGH (ref 8–20)
CALCIUM SERPL-MCNC: 8.7 MG/DL — SIGNIFICANT CHANGE UP (ref 8.6–10.2)
CHLORIDE SERPL-SCNC: 104 MMOL/L — SIGNIFICANT CHANGE UP (ref 98–107)
CO2 SERPL-SCNC: 22 MMOL/L — SIGNIFICANT CHANGE UP (ref 22–29)
CREAT SERPL-MCNC: 0.79 MG/DL — SIGNIFICANT CHANGE UP (ref 0.5–1.3)
GLUCOSE BLDC GLUCOMTR-MCNC: 168 MG/DL — HIGH (ref 70–99)
GLUCOSE BLDC GLUCOMTR-MCNC: 189 MG/DL — HIGH (ref 70–99)
GLUCOSE BLDC GLUCOMTR-MCNC: 195 MG/DL — HIGH (ref 70–99)
GLUCOSE BLDC GLUCOMTR-MCNC: 211 MG/DL — HIGH (ref 70–99)
GLUCOSE BLDC GLUCOMTR-MCNC: 226 MG/DL — HIGH (ref 70–99)
GLUCOSE SERPL-MCNC: 230 MG/DL — HIGH (ref 70–99)
HCT VFR BLD CALC: 38.6 % — SIGNIFICANT CHANGE UP (ref 34.5–45)
HGB BLD-MCNC: 11.7 G/DL — SIGNIFICANT CHANGE UP (ref 11.5–15.5)
MAGNESIUM SERPL-MCNC: 2.6 MG/DL — SIGNIFICANT CHANGE UP (ref 1.6–2.6)
MCHC RBC-ENTMCNC: 27.3 PG — SIGNIFICANT CHANGE UP (ref 27–34)
MCHC RBC-ENTMCNC: 30.3 GM/DL — LOW (ref 32–36)
MCV RBC AUTO: 90.2 FL — SIGNIFICANT CHANGE UP (ref 80–100)
PHOSPHATE SERPL-MCNC: 2.2 MG/DL — LOW (ref 2.4–4.7)
PLATELET # BLD AUTO: 440 K/UL — HIGH (ref 150–400)
POTASSIUM SERPL-MCNC: 4.6 MMOL/L — SIGNIFICANT CHANGE UP (ref 3.5–5.3)
POTASSIUM SERPL-SCNC: 4.6 MMOL/L — SIGNIFICANT CHANGE UP (ref 3.5–5.3)
RBC # BLD: 4.28 M/UL — SIGNIFICANT CHANGE UP (ref 3.8–5.2)
RBC # FLD: 15.6 % — HIGH (ref 10.3–14.5)
SARS-COV-2 RNA SPEC QL NAA+PROBE: SIGNIFICANT CHANGE UP
SODIUM SERPL-SCNC: 139 MMOL/L — SIGNIFICANT CHANGE UP (ref 135–145)
WBC # BLD: 15.06 K/UL — HIGH (ref 3.8–10.5)
WBC # FLD AUTO: 15.06 K/UL — HIGH (ref 3.8–10.5)

## 2020-05-12 PROCEDURE — 99233 SBSQ HOSP IP/OBS HIGH 50: CPT | Mod: CS

## 2020-05-12 PROCEDURE — 99232 SBSQ HOSP IP/OBS MODERATE 35: CPT | Mod: CS

## 2020-05-12 RX ORDER — ENOXAPARIN SODIUM 100 MG/ML
40 INJECTION SUBCUTANEOUS DAILY
Refills: 0 | Status: DISCONTINUED | OUTPATIENT
Start: 2020-05-12 | End: 2020-05-18

## 2020-05-12 RX ADMIN — FENTANYL CITRATE 1 PATCH: 50 INJECTION INTRAVENOUS at 22:23

## 2020-05-12 RX ADMIN — FENTANYL CITRATE 1 PATCH: 50 INJECTION INTRAVENOUS at 05:11

## 2020-05-12 RX ADMIN — Medication 4: at 12:24

## 2020-05-12 RX ADMIN — PANTOPRAZOLE SODIUM 40 MILLIGRAM(S): 20 TABLET, DELAYED RELEASE ORAL at 10:42

## 2020-05-12 RX ADMIN — Medication 500 MILLIGRAM(S): at 10:43

## 2020-05-12 RX ADMIN — SENNA PLUS 2 TABLET(S): 8.6 TABLET ORAL at 10:42

## 2020-05-12 RX ADMIN — CHLORHEXIDINE GLUCONATE 1 APPLICATION(S): 213 SOLUTION TOPICAL at 05:10

## 2020-05-12 RX ADMIN — ENOXAPARIN SODIUM 40 MILLIGRAM(S): 100 INJECTION SUBCUTANEOUS at 11:47

## 2020-05-12 RX ADMIN — Medication 300 MILLIGRAM(S): at 16:33

## 2020-05-12 RX ADMIN — Medication 1 TABLET(S): at 10:43

## 2020-05-12 RX ADMIN — MONTELUKAST 10 MILLIGRAM(S): 4 TABLET, CHEWABLE ORAL at 10:42

## 2020-05-12 RX ADMIN — Medication 2: at 22:19

## 2020-05-12 RX ADMIN — Medication 2: at 17:46

## 2020-05-12 RX ADMIN — INSULIN GLARGINE 10 UNIT(S): 100 INJECTION, SOLUTION SUBCUTANEOUS at 10:11

## 2020-05-12 RX ADMIN — CEFEPIME 100 MILLIGRAM(S): 1 INJECTION, POWDER, FOR SOLUTION INTRAMUSCULAR; INTRAVENOUS at 16:32

## 2020-05-12 RX ADMIN — POLYETHYLENE GLYCOL 3350 17 GRAM(S): 17 POWDER, FOR SOLUTION ORAL at 10:42

## 2020-05-12 RX ADMIN — CHLORHEXIDINE GLUCONATE 15 MILLILITER(S): 213 SOLUTION TOPICAL at 05:10

## 2020-05-12 RX ADMIN — Medication 1 DROP(S): at 11:49

## 2020-05-12 RX ADMIN — Medication 2: at 05:15

## 2020-05-12 RX ADMIN — CHLORHEXIDINE GLUCONATE 15 MILLILITER(S): 213 SOLUTION TOPICAL at 16:33

## 2020-05-12 RX ADMIN — INSULIN GLARGINE 10 UNIT(S): 100 INJECTION, SOLUTION SUBCUTANEOUS at 22:18

## 2020-05-12 RX ADMIN — Medication 1 DROP(S): at 22:17

## 2020-05-12 RX ADMIN — ENOXAPARIN SODIUM 60 MILLIGRAM(S): 100 INJECTION SUBCUTANEOUS at 05:11

## 2020-05-12 RX ADMIN — CEFEPIME 100 MILLIGRAM(S): 1 INJECTION, POWDER, FOR SOLUTION INTRAMUSCULAR; INTRAVENOUS at 05:10

## 2020-05-12 RX ADMIN — Medication 1 DROP(S): at 05:10

## 2020-05-12 NOTE — PROGRESS NOTE ADULT - SUBJECTIVE AND OBJECTIVE BOX
[facility]  INFECTIOUS DISEASES AND INTERNAL MEDICINE at [place]  =======================================================  Sid Mendoza MD  Diplomates American Board of Internal Medicine and Infectious Diseases  =======================================================    [facility]-[id number]  BREA BECKHAM     Follow up:  acute respiratory failure      eyes are open, looks better than last few days.  NAD, On vent trough trach with SO2 99%.     PAST MEDICAL & SURGICAL HISTORY:  Quadriplegia  COVID-19 virus detected  Advanced dementia  DM (diabetes mellitus)  HTN (hypertension)  CVA (cerebral vascular accident)  Respiratory failure  Constipation  GERD (gastroesophageal reflux disease)  Hypertension  Respiratory failure  Diabetes mellitus  Aphasic stroke  Dementia of frontal lobe type  Feeding by G-tube  Tracheostomy tube present  Tracheostomy in place  Gastrostomy in place  Hx of appendectomy    Social Hx: no smoking    FAMILY HISTORY:  No pertinent family history in first degree relatives    Allergies  codeine (Hives)    Antibiotics:   cefepime   IVPB 2000 milliGRAM(s) IV Intermittent once     REVIEW OF SYSTEMS:  Nonverbal     Physical Exam:  Vital Signs Last 24 Hrs  T(C): 36.7 (12 May 2020 07:48), Max: 37.2 (11 May 2020 23:37)  T(F): 98 (12 May 2020 07:48), Max: 99 (11 May 2020 23:37)  HR: 109 (12 May 2020 08:43) (77 - 114)  BP: 150/68 (11 May 2020 23:37) (141/76 - 150/68)  BP(mean): --  RR: 20 (12 May 2020 07:48) (16 - 20)  SpO2: 95% (12 May 2020 08:43) (92% - 97%)  GEN: NAD  HEENT: normocephalic and atraumatic.   NECK: Supple.  No lymphadenopathy   LUNGS: Clear to auscultation.  HEART: Regular rate and rhythm   ABDOMEN: Soft, nontender, and nondistended.  Positive bowel sounds. +PEG  EXTREMITIES: Without edema.  NEUROLOGIC: Contracted all extremities   PSYCHIATRIC: Awake but not communicative   SKIN: No ulceration or induration present.    Labs:  05-12    139  |  104  |  32.0<H>  ----------------------------<  230<H>  4.6   |  22.0  |  0.79    Ca    8.7      12 May 2020 07:26  Phos  2.2     05-12  Mg     2.6     05-12                        11.7   15.06 )-----------( 440      ( 12 May 2020 07:26 )             38.6     RECENT CULTURES:  05-05 @ 17:02 .Sputum Sputum Pseudomonas aeruginosa    Few Pseudomonas aeruginosa  Normal Respiratory Elvira present    Numerous polymorphonuclear leukocytes seen per low power field  Few Squamous epithelial cells seen per low power field  Moderate Gram Positive Rods seen per oil power field  Few Gram Negative Rods seen per oil power field  Few Gram positive cocci in pairs, chains and clusters seen per oil power  field    05-04 @ 21:22 .Urine Clean Catch (Midstream) Proteus mirabilis    >100,000 CFU/ml Proteus mirabilis    05-04 @ 14:01 .Blood Blood-Peripheral     No growth at 5 days.    All imaging and other data have been reviewed.  < from: CT Angio Chest w/ IV Cont (05.08.20 @ 14:13) >  EXAM:  CT ANGIO CHEST (W)AW IC                        PROCEDURE DATE:  05/08/2020    INTERPRETATION:  CLINICAL INFORMATION: Covid positive, shortness of breath, rule out PE  COMPARISON: 07/21/2017.  PROCEDURE:   CT Angiography of the Chest.  51 ml of Omnipaque 350 was injected intravenously. 49 ml were discarded.  Sagittal and coronal reformats were performed as well as 3D (MIP) reconstructions.  FINDINGS:  There is good opacification of the pulmonary arteries, however the study is markedly limited by patient motion. There is no saddle PE.  LUNGS AND AIRWAYS: Tracheostomy tube.  There bilateral diffuse patchy opacities.  PLEURA: Trace bilateral pleural effusions with associated atelectasis.  MEDIASTINUM AND JHONNY: The proximal esophagus is dilated and air-filled, similar to prior.  VESSELS: Within normal limits.  HEART: Heart size is normal. Small bowel pericardial effusion.  CHEST WALL AND LOWER NECK: Within normal limits.  VISUALIZED UPPER ABDOMEN: Gastrostomy tube in place.  BONES: Mild degenerative changes of the spine.  IMPRESSION:  Pattern of GGO suggests infection including atypical pneumonia/viral infection from atypical agents including COVID-19 (C19V-1).  Limited study due to respiratory motion. No saddle PE.  Small bilateral pleural effusions and small pericardial effusion.    Assessment and Plan:   73 y/o woman with with hx of frontal lobe dementia, stroke, functional quadriplegia, tracheostomy normally on trach collar, dysphagia s/p PEG, nursing home patient was admitted on 5/4 with with fever and worsening hypoxemia. She tested positive for COVID19 in the nursing home. Sputum culture showed pseudomonas and urine proteus so ID was called for recommendations. Patient is awake but not communicative. Due to worsening of respiration and higher FIO2 requirement and also possible seizure, CTA and head CT were done.    COVID 19 + infection  Viral pneumonia  Acute hypoxic respiratory failure  Bacterial pneumonia  UTI  Stroke  Chronic trach and PEG    - Airborne and contact isolation   - COVID 19 PCR positive   - CXR with multifocal opacities   - Procalcitonin 30.47  - CRP=36.32, Nesxyexk=299 and JUU=088  - Blood culture negative  - Leukocytosis 15k today.   - Sputum culture with Pseudomonas pansensitive   - UA negative but urine culture with proteus that could be contamination   - Continue Cefepime 2gm q12 to cover sputum and urine both   - Will treat for 10-14 days.     Will follow.

## 2020-05-12 NOTE — PROGRESS NOTE ADULT - SUBJECTIVE AND OBJECTIVE BOX
CC: acute respiratory failure (11 May 2020 15:51)    HPI:  73 yo female with hx of frontal lobe dementia, stroke, functional quadriplegia, tracheostomy normally on trach collar, dysphagia s/p PEG, nursing home patient, presents to the ED with fever and worsening hypoxemia.  She tested positive for COVID19 in the nursing home.  In ED, tracheostomy switched to a cuffed trach, patient placed on mechanical ventilation, 65% fio2. (04 May 2020 18:44)    INTERVAL HPI/OVERNIGHT EVENTS: Patient seen and examined lying in bed.  Patient nonverbal, tolerating 50% on vent.  P    Vital Signs Last 24 Hrs  T(C): 36.7 (12 May 2020 07:48), Max: 37.2 (11 May 2020 23:37)  T(F): 98 (12 May 2020 07:48), Max: 99 (11 May 2020 23:37)  HR: 109 (12 May 2020 08:43) (77 - 114)  BP: 150/68 (11 May 2020 23:37) (141/76 - 150/68)  BP(mean): --  RR: 20 (12 May 2020 07:48) (16 - 20)  SpO2: 95% (12 May 2020 08:43) (92% - 97%)  I&O's Detail    11 May 2020 07:01  -  12 May 2020 07:00  --------------------------------------------------------  IN:    Enteral Tube Flush: 750 mL    Glucerna 1.5: 360 mL  Total IN: 1110 mL    OUT:    Voided: 600 mL  Total OUT: 600 mL    Total NET: 510 mL                                    11.7   15.06 )-----------( 440      ( 12 May 2020 07:26 )             38.6     12 May 2020 07:26    139    |  104    |  32.0   ----------------------------<  230    4.6     |  22.0   |  0.79     Ca    8.7        12 May 2020 07:26  Phos  2.2       12 May 2020 07:26  Mg     2.6       12 May 2020 07:26        CAPILLARY BLOOD GLUCOSE      POCT Blood Glucose.: 211 mg/dL (12 May 2020 10:00)  POCT Blood Glucose.: 195 mg/dL (12 May 2020 05:14)  POCT Blood Glucose.: 191 mg/dL (11 May 2020 23:14)  POCT Blood Glucose.: 199 mg/dL (11 May 2020 17:30)  POCT Blood Glucose.: 148 mg/dL (11 May 2020 11:07)          MEDICATIONS  (STANDING):  artificial  tears Solution 1 Drop(s) Both EYES three times a day  ascorbic acid 500 milliGRAM(s) Oral daily  calcium carbonate 1250 mG  + Vitamin D (OsCal 500 + D) 1 Tablet(s) Oral daily  cefepime   IVPB      cefepime   IVPB 2000 milliGRAM(s) IV Intermittent every 12 hours  chlorhexidine 0.12% Liquid 15 milliLiter(s) Oral Mucosa every 12 hours  chlorhexidine 4% Liquid 1 Application(s) Topical <User Schedule>  dextrose 5%. 1000 milliLiter(s) (50 mL/Hr) IV Continuous <Continuous>  dextrose 50% Injectable 12.5 Gram(s) IV Push once  dextrose 50% Injectable 25 Gram(s) IV Push once  dextrose 50% Injectable 25 Gram(s) IV Push once  enoxaparin Injectable 60 milliGRAM(s) SubCutaneous every 12 hours  fentaNYL   Patch  12 MICROgram(s)/Hr 1 Patch Transdermal every 72 hours  ferrous    sulfate Liquid 300 milliGRAM(s) Enteral Tube daily  insulin glargine Injectable (LANTUS) 10 Unit(s) SubCutaneous every morning  insulin glargine Injectable (LANTUS) 10 Unit(s) SubCutaneous at bedtime  insulin lispro (HumaLOG) corrective regimen sliding scale   SubCutaneous every 6 hours  montelukast 10 milliGRAM(s) Oral daily  pantoprazole   Suspension 40 milliGRAM(s) Oral daily  polyethylene glycol 3350 17 Gram(s) Oral daily  senna 2 Tablet(s) Oral daily  sodium chloride 0.9% Bolus 500 milliLiter(s) IV Bolus once    MEDICATIONS  (PRN):  acetaminophen    Suspension .. 650 milliGRAM(s) Oral every 6 hours PRN Temp greater or equal to 38C (100.4F)  dextrose 40% Gel 15 Gram(s) Oral once PRN Blood Glucose LESS THAN 70 milliGRAM(s)/deciliter  glucagon  Injectable 1 milliGRAM(s) IntraMuscular once PRN Glucose LESS THAN 70 milligrams/deciliter  LORazepam   Injectable 0.5 milliGRAM(s) IV Push every 4 hours PRN tachypnea  midodrine 10 milliGRAM(s) Oral every 8 hours PRN for sBP of 100 or less  morphine  - Injectable 2 milliGRAM(s) IV Push every 4 hours PRN Severe Pain (7 - 10)      RADIOLOGY & ADDITIONAL TESTS: CC: acute respiratory failure (11 May 2020 15:51)    HPI:  73 yo female with hx of frontal lobe dementia, stroke, functional quadriplegia, tracheostomy normally on trach collar, dysphagia s/p PEG, nursing home patient, presents to the ED with fever and worsening hypoxemia.  She tested positive for COVID19 in the nursing home.  In ED, tracheostomy switched to a cuffed trach, patient placed on mechanical ventilation, 65% fio2. (04 May 2020 18:44)    INTERVAL HPI/OVERNIGHT EVENTS: Patient seen and examined lying in bed.  Patient nonverbal, tolerating 50% on vent.  Patient nonverbal and unable to answer ROS.    Vital Signs Last 24 Hrs  T(C): 36.7 (12 May 2020 07:48), Max: 37.2 (11 May 2020 23:37)  T(F): 98 (12 May 2020 07:48), Max: 99 (11 May 2020 23:37)  HR: 109 (12 May 2020 08:43) (77 - 114)  BP: 150/68 (11 May 2020 23:37) (141/76 - 150/68)  BP(mean): --  RR: 20 (12 May 2020 07:48) (16 - 20)  SpO2: 95% (12 May 2020 08:43) (92% - 97%)  I&O's Detail    11 May 2020 07:01  -  12 May 2020 07:00  --------------------------------------------------------  IN:    Enteral Tube Flush: 750 mL    Glucerna 1.5: 360 mL  Total IN: 1110 mL    OUT:    Voided: 600 mL  Total OUT: 600 mL    Total NET: 510 mL      PHYSICAL EXAM:  GENERAL: NAD  HEAD:  Atraumatic, Normocephalic  NECK: Supple, No JVD, Normal thyroid; (+) trach to vent  NERVOUS SYSTEM:  Alert, generalized weakness  CHEST/LUNG: Bilateral air entry  HEART: Regular rate and rhythm; No murmurs, rubs, or gallops  ABDOMEN: Soft, Nontender, Nondistended; Bowel sounds present; (+) peg  EXTREMITIES:  2+ Peripheral Pulses                              11.7   15.06 )-----------( 440      ( 12 May 2020 07:26 )             38.6     12 May 2020 07:26    139    |  104    |  32.0   ----------------------------<  230    4.6     |  22.0   |  0.79     Ca    8.7        12 May 2020 07:26  Phos  2.2       12 May 2020 07:26  Mg     2.6       12 May 2020 07:26        CAPILLARY BLOOD GLUCOSE  POCT Blood Glucose.: 211 mg/dL (12 May 2020 10:00)  POCT Blood Glucose.: 195 mg/dL (12 May 2020 05:14)  POCT Blood Glucose.: 191 mg/dL (11 May 2020 23:14)  POCT Blood Glucose.: 199 mg/dL (11 May 2020 17:30)  POCT Blood Glucose.: 148 mg/dL (11 May 2020 11:07)          MEDICATIONS  (STANDING):  artificial  tears Solution 1 Drop(s) Both EYES three times a day  ascorbic acid 500 milliGRAM(s) Oral daily  calcium carbonate 1250 mG  + Vitamin D (OsCal 500 + D) 1 Tablet(s) Oral daily  cefepime   IVPB      cefepime   IVPB 2000 milliGRAM(s) IV Intermittent every 12 hours  chlorhexidine 0.12% Liquid 15 milliLiter(s) Oral Mucosa every 12 hours  chlorhexidine 4% Liquid 1 Application(s) Topical <User Schedule>  dextrose 5%. 1000 milliLiter(s) (50 mL/Hr) IV Continuous <Continuous>  dextrose 50% Injectable 12.5 Gram(s) IV Push once  dextrose 50% Injectable 25 Gram(s) IV Push once  dextrose 50% Injectable 25 Gram(s) IV Push once  enoxaparin Injectable 60 milliGRAM(s) SubCutaneous every 12 hours  fentaNYL   Patch  12 MICROgram(s)/Hr 1 Patch Transdermal every 72 hours  ferrous    sulfate Liquid 300 milliGRAM(s) Enteral Tube daily  insulin glargine Injectable (LANTUS) 10 Unit(s) SubCutaneous every morning  insulin glargine Injectable (LANTUS) 10 Unit(s) SubCutaneous at bedtime  insulin lispro (HumaLOG) corrective regimen sliding scale   SubCutaneous every 6 hours  montelukast 10 milliGRAM(s) Oral daily  pantoprazole   Suspension 40 milliGRAM(s) Oral daily  polyethylene glycol 3350 17 Gram(s) Oral daily  senna 2 Tablet(s) Oral daily  sodium chloride 0.9% Bolus 500 milliLiter(s) IV Bolus once    MEDICATIONS  (PRN):  acetaminophen    Suspension .. 650 milliGRAM(s) Oral every 6 hours PRN Temp greater or equal to 38C (100.4F)  dextrose 40% Gel 15 Gram(s) Oral once PRN Blood Glucose LESS THAN 70 milliGRAM(s)/deciliter  glucagon  Injectable 1 milliGRAM(s) IntraMuscular once PRN Glucose LESS THAN 70 milligrams/deciliter  LORazepam   Injectable 0.5 milliGRAM(s) IV Push every 4 hours PRN tachypnea  midodrine 10 milliGRAM(s) Oral every 8 hours PRN for sBP of 100 or less  morphine  - Injectable 2 milliGRAM(s) IV Push every 4 hours PRN Severe Pain (7 - 10)

## 2020-05-12 NOTE — PROGRESS NOTE ADULT - PROBLEM SELECTOR PLAN 1
Sec COVID 19 infection and bacterial pneumonia, continue ventilator support.  Continue Cefepime to complete 10-14day course. Pulmonary note appreciated.  Not Septic.

## 2020-05-12 NOTE — PROGRESS NOTE ADULT - ASSESSMENT
Post COVID respiratory failure  Vented for now  Had very high FIO2 requirements>better    Plan:  1.PSV trials as ordered>eventual trach collar trials  2.Titrate O2 to maintain sat>93%

## 2020-05-12 NOTE — PROGRESS NOTE ADULT - SUBJECTIVE AND OBJECTIVE BOX
PULMONARY PROGRESS NOTE      MALENA BECKHAM-[id number]    Patient is a 74y old  Female who presents with a chief complaint of acute respiratory failure (12 May 2020 13:00)      INTERVAL HPI/OVERNIGHT EVENTS:  Tolerating 50%  Currently on A/C    MEDICATIONS  (STANDING):  artificial  tears Solution 1 Drop(s) Both EYES three times a day  ascorbic acid 500 milliGRAM(s) Oral daily  calcium carbonate 1250 mG  + Vitamin D (OsCal 500 + D) 1 Tablet(s) Oral daily  cefepime   IVPB      cefepime   IVPB 2000 milliGRAM(s) IV Intermittent every 12 hours  chlorhexidine 0.12% Liquid 15 milliLiter(s) Oral Mucosa every 12 hours  chlorhexidine 4% Liquid 1 Application(s) Topical <User Schedule>  dextrose 5%. 1000 milliLiter(s) (50 mL/Hr) IV Continuous <Continuous>  dextrose 50% Injectable 12.5 Gram(s) IV Push once  dextrose 50% Injectable 25 Gram(s) IV Push once  dextrose 50% Injectable 25 Gram(s) IV Push once  enoxaparin Injectable 40 milliGRAM(s) SubCutaneous daily  fentaNYL   Patch  12 MICROgram(s)/Hr 1 Patch Transdermal every 72 hours  ferrous    sulfate Liquid 300 milliGRAM(s) Enteral Tube daily  insulin glargine Injectable (LANTUS) 10 Unit(s) SubCutaneous every morning  insulin glargine Injectable (LANTUS) 10 Unit(s) SubCutaneous at bedtime  insulin lispro (HumaLOG) corrective regimen sliding scale   SubCutaneous every 6 hours  montelukast 10 milliGRAM(s) Oral daily  pantoprazole   Suspension 40 milliGRAM(s) Oral daily  polyethylene glycol 3350 17 Gram(s) Oral daily  senna 2 Tablet(s) Oral daily  sodium chloride 0.9% Bolus 500 milliLiter(s) IV Bolus once      MEDICATIONS  (PRN):  acetaminophen    Suspension .. 650 milliGRAM(s) Oral every 6 hours PRN Temp greater or equal to 38C (100.4F)  dextrose 40% Gel 15 Gram(s) Oral once PRN Blood Glucose LESS THAN 70 milliGRAM(s)/deciliter  glucagon  Injectable 1 milliGRAM(s) IntraMuscular once PRN Glucose LESS THAN 70 milligrams/deciliter  LORazepam   Injectable 0.5 milliGRAM(s) IV Push every 4 hours PRN tachypnea  midodrine 10 milliGRAM(s) Oral every 8 hours PRN for sBP of 100 or less  morphine  - Injectable 2 milliGRAM(s) IV Push every 4 hours PRN Severe Pain (7 - 10)      Allergies    codeine (Hives)    Intolerances        PAST MEDICAL & SURGICAL HISTORY:  Quadriplegia  COVID-19 virus detected  Advanced dementia  DM (diabetes mellitus)  HTN (hypertension)  CVA (cerebral vascular accident)  Respiratory failure  Constipation  GERD (gastroesophageal reflux disease)  Hypertension  Respiratory failure  Diabetes mellitus  Aphasic stroke  Dementia of frontal lobe type  Feeding by G-tube  Tracheostomy tube present  Tracheostomy in place  Gastrostomy in place  Hx of appendectomy      SOCIAL HISTORY  Smoking History:       REVIEW OF SYSTEMS:Unobtainable    CONSTITUTIONAL:  No distress        Vital Signs Last 24 Hrs  T(C): 36.7 (12 May 2020 07:48), Max: 37.2 (11 May 2020 23:37)  T(F): 98 (12 May 2020 07:48), Max: 99 (11 May 2020 23:37)  HR: 109 (12 May 2020 08:43) (79 - 114)  BP: 150/68 (11 May 2020 23:37) (141/76 - 150/68)  BP(mean): --  RR: 20 (12 May 2020 07:48) (16 - 20)  SpO2: 95% (12 May 2020 08:43) (92% - 97%)    PHYSICAL EXAMINATION:    GENERAL: The patient is awake in no apparent distress.     HEENT: Head is normocephalic and atraumatic. Extraocular muscles are intact. Mucous membranes are moist.    NECK: Trach site clear    LUNGS: Clear to auscultation without wheezing, rales or rhonchi; respirations unlabored    HEART: Regular rate and rhythm without murmur.    ABDOMEN: Soft, nontender, and nondistended.      EXTREMITIES: Without any cyanosis, clubbing, rash, lesions or edema.    NEUROLOGIC: Unresponsive    SKIN: No ulceration or induration present.      LABS:                        11.7   15.06 )-----------( 440      ( 12 May 2020 07:26 )             38.6     05-12    139  |  104  |  32.0<H>  ----------------------------<  230<H>  4.6   |  22.0  |  0.79    Ca    8.7      12 May 2020 07:26  Phos  2.2     05-12  Mg     2.6     05-12                          MICROBIOLOGY:    RADIOLOGY & ADDITIONAL STUDIES:

## 2020-05-12 NOTE — PROGRESS NOTE ADULT - ATTENDING COMMENTS
Patient seen and examined at bedside. Events noted, + bowel movements as per NH. Decreased FiO2 requirements on ventilator.  Vascular surgery recs noted  DVT prophylaxis  monitor WBC  will need Cefepime, possible midline placement  repeat COVID 19 testing for discharge planning.  discussed with RN and NP. Patient seen and examined at bedside. Events noted, + bowel movements as per NH. Decreased FiO2 requirements on ventilator.  Vascular surgery recs noted  DVT prophylaxis  monitor WBC  will need Cefepime, possible midline placement  repeat COVID 19 testing for discharge planning.  discussed with RN and NP.  updated son and daughter regarding plan of care, they want patient to be stable for FiO2 to 30%, interested in looking into other facilities.

## 2020-05-12 NOTE — CDI QUERY NOTE - NSCDIOTHERTXTBX_GEN_ALL_CORE_HH
Pt. admitted with R 22-26, T 101.1, P 94.    ED- Care Plan - Instructions:  Principal Discharge DX:	Acute respiratory failure with hypoxia  Secondary Diagnosis:	Pneumonia  Secondary Diagnosis:	UTI (urinary tract infection).    H&P-  now with COVID19 viral pneumonia, acute respiratory requiring mechanical ventialtion (normally on trach mask).    5/11 Hospitalist- ·  Problem: Acute respiratory failure with hypoxia.  Plan: Sec COVID 19 infection and bacterial pneumonia, continue ventilator support.  Continue Cefepime to complete 10-14day course.  ·  Problem: UTI (urinary tract infection).  Plan: On cefepime.     Is there a clinical diagnosis associated with above criteria?    1) Sepsis present on admission  2) No sepsis  3) Other  4) Not clinically significant

## 2020-05-13 LAB
ANION GAP SERPL CALC-SCNC: 12 MMOL/L — SIGNIFICANT CHANGE UP (ref 5–17)
BUN SERPL-MCNC: 23 MG/DL — HIGH (ref 8–20)
CALCIUM SERPL-MCNC: 8.7 MG/DL — SIGNIFICANT CHANGE UP (ref 8.6–10.2)
CHLORIDE SERPL-SCNC: 98 MMOL/L — SIGNIFICANT CHANGE UP (ref 98–107)
CO2 SERPL-SCNC: 23 MMOL/L — SIGNIFICANT CHANGE UP (ref 22–29)
CREAT SERPL-MCNC: 0.58 MG/DL — SIGNIFICANT CHANGE UP (ref 0.5–1.3)
CRP SERPL-MCNC: 9.59 MG/DL — HIGH (ref 0–0.4)
GLUCOSE BLDC GLUCOMTR-MCNC: 152 MG/DL — HIGH (ref 70–99)
GLUCOSE BLDC GLUCOMTR-MCNC: 170 MG/DL — HIGH (ref 70–99)
GLUCOSE BLDC GLUCOMTR-MCNC: 188 MG/DL — HIGH (ref 70–99)
GLUCOSE BLDC GLUCOMTR-MCNC: 198 MG/DL — HIGH (ref 70–99)
GLUCOSE SERPL-MCNC: 169 MG/DL — HIGH (ref 70–99)
HCT VFR BLD CALC: 33.7 % — LOW (ref 34.5–45)
HGB BLD-MCNC: 10.5 G/DL — LOW (ref 11.5–15.5)
MAGNESIUM SERPL-MCNC: 2.2 MG/DL — SIGNIFICANT CHANGE UP (ref 1.6–2.6)
MCHC RBC-ENTMCNC: 27.8 PG — SIGNIFICANT CHANGE UP (ref 27–34)
MCHC RBC-ENTMCNC: 31.2 GM/DL — LOW (ref 32–36)
MCV RBC AUTO: 89.2 FL — SIGNIFICANT CHANGE UP (ref 80–100)
PHOSPHATE SERPL-MCNC: 2.6 MG/DL — SIGNIFICANT CHANGE UP (ref 2.4–4.7)
PLATELET # BLD AUTO: 378 K/UL — SIGNIFICANT CHANGE UP (ref 150–400)
POTASSIUM SERPL-MCNC: 4.4 MMOL/L — SIGNIFICANT CHANGE UP (ref 3.5–5.3)
POTASSIUM SERPL-SCNC: 4.4 MMOL/L — SIGNIFICANT CHANGE UP (ref 3.5–5.3)
PROCALCITONIN SERPL-MCNC: 0.3 NG/ML — HIGH (ref 0.02–0.1)
RBC # BLD: 3.78 M/UL — LOW (ref 3.8–5.2)
RBC # FLD: 15.6 % — HIGH (ref 10.3–14.5)
SARS-COV-2 RNA SPEC QL NAA+PROBE: SIGNIFICANT CHANGE UP
SODIUM SERPL-SCNC: 133 MMOL/L — LOW (ref 135–145)
WBC # BLD: 10.2 K/UL — SIGNIFICANT CHANGE UP (ref 3.8–10.5)
WBC # FLD AUTO: 10.2 K/UL — SIGNIFICANT CHANGE UP (ref 3.8–10.5)

## 2020-05-13 PROCEDURE — 99233 SBSQ HOSP IP/OBS HIGH 50: CPT | Mod: CS

## 2020-05-13 PROCEDURE — 99232 SBSQ HOSP IP/OBS MODERATE 35: CPT | Mod: CS

## 2020-05-13 RX ADMIN — SENNA PLUS 2 TABLET(S): 8.6 TABLET ORAL at 11:39

## 2020-05-13 RX ADMIN — FENTANYL CITRATE 1 PATCH: 50 INJECTION INTRAVENOUS at 05:02

## 2020-05-13 RX ADMIN — Medication 2: at 17:36

## 2020-05-13 RX ADMIN — Medication 1 TABLET(S): at 11:39

## 2020-05-13 RX ADMIN — MONTELUKAST 10 MILLIGRAM(S): 4 TABLET, CHEWABLE ORAL at 11:39

## 2020-05-13 RX ADMIN — CHLORHEXIDINE GLUCONATE 1 APPLICATION(S): 213 SOLUTION TOPICAL at 05:03

## 2020-05-13 RX ADMIN — INSULIN GLARGINE 10 UNIT(S): 100 INJECTION, SOLUTION SUBCUTANEOUS at 23:15

## 2020-05-13 RX ADMIN — FENTANYL CITRATE 1 PATCH: 50 INJECTION INTRAVENOUS at 05:03

## 2020-05-13 RX ADMIN — POLYETHYLENE GLYCOL 3350 17 GRAM(S): 17 POWDER, FOR SOLUTION ORAL at 11:37

## 2020-05-13 RX ADMIN — Medication 1 DROP(S): at 11:40

## 2020-05-13 RX ADMIN — CHLORHEXIDINE GLUCONATE 15 MILLILITER(S): 213 SOLUTION TOPICAL at 04:52

## 2020-05-13 RX ADMIN — Medication 2: at 13:01

## 2020-05-13 RX ADMIN — Medication 1 DROP(S): at 21:18

## 2020-05-13 RX ADMIN — INSULIN GLARGINE 10 UNIT(S): 100 INJECTION, SOLUTION SUBCUTANEOUS at 08:28

## 2020-05-13 RX ADMIN — Medication 300 MILLIGRAM(S): at 11:39

## 2020-05-13 RX ADMIN — PANTOPRAZOLE SODIUM 40 MILLIGRAM(S): 20 TABLET, DELAYED RELEASE ORAL at 11:37

## 2020-05-13 RX ADMIN — Medication 1 DROP(S): at 04:44

## 2020-05-13 RX ADMIN — Medication 2: at 23:15

## 2020-05-13 RX ADMIN — CEFEPIME 100 MILLIGRAM(S): 1 INJECTION, POWDER, FOR SOLUTION INTRAMUSCULAR; INTRAVENOUS at 04:47

## 2020-05-13 RX ADMIN — CHLORHEXIDINE GLUCONATE 15 MILLILITER(S): 213 SOLUTION TOPICAL at 16:59

## 2020-05-13 RX ADMIN — ENOXAPARIN SODIUM 40 MILLIGRAM(S): 100 INJECTION SUBCUTANEOUS at 11:36

## 2020-05-13 RX ADMIN — CEFEPIME 100 MILLIGRAM(S): 1 INJECTION, POWDER, FOR SOLUTION INTRAMUSCULAR; INTRAVENOUS at 16:58

## 2020-05-13 RX ADMIN — Medication 2: at 04:52

## 2020-05-13 RX ADMIN — FENTANYL CITRATE 1 PATCH: 50 INJECTION INTRAVENOUS at 20:37

## 2020-05-13 RX ADMIN — Medication 500 MILLIGRAM(S): at 11:37

## 2020-05-13 NOTE — PROGRESS NOTE ADULT - SUBJECTIVE AND OBJECTIVE BOX
Patient is a 74y old  Female who presents with a chief complaint of acute respiratory failure (13 May 2020 12:55)      BRIEF HOSPITAL COURSE: 4-year-old female with a history of frontal lobe dementia, stroke, functional quadriplegia, chronic tracheostomy, dysphasia, with chronic gastrostomy tube admitted on 4/27 with Covid 19 requiring mechanical ventilation. Patient transferred to [facility] for further weaning. Currently on full dose anticoagulation, steroids, cefepime. Unable to communicate    Events last 24 hours: Currently weaned to CPAP 5/PSV 10 with sats 96% despite below    PAST MEDICAL & SURGICAL HISTORY:  Quadriplegia  COVID-19 virus detected  Advanced dementia  DM (diabetes mellitus)  HTN (hypertension)  CVA (cerebral vascular accident)  Respiratory failure  Constipation  GERD (gastroesophageal reflux disease)  Hypertension  Respiratory failure  Diabetes mellitus  Aphasic stroke  Dementia of frontal lobe type  Feeding by G-tube  Tracheostomy tube present  Tracheostomy in place  Gastrostomy in place  Hx of appendectomy        Medications:  cefepime   IVPB      cefepime   IVPB 2000 milliGRAM(s) IV Intermittent every 12 hours      montelukast 10 milliGRAM(s) Oral daily    acetaminophen    Suspension .. 650 milliGRAM(s) Oral every 6 hours PRN  fentaNYL   Patch  12 MICROgram(s)/Hr 1 Patch Transdermal every 72 hours  LORazepam   Injectable 0.5 milliGRAM(s) IV Push every 4 hours PRN      enoxaparin Injectable 40 milliGRAM(s) SubCutaneous daily    pantoprazole   Suspension 40 milliGRAM(s) Oral daily  polyethylene glycol 3350 17 Gram(s) Oral daily  senna 2 Tablet(s) Oral daily      dextrose 40% Gel 15 Gram(s) Oral once PRN  dextrose 50% Injectable 12.5 Gram(s) IV Push once  dextrose 50% Injectable 25 Gram(s) IV Push once  dextrose 50% Injectable 25 Gram(s) IV Push once  glucagon  Injectable 1 milliGRAM(s) IntraMuscular once PRN  insulin glargine Injectable (LANTUS) 10 Unit(s) SubCutaneous every morning  insulin glargine Injectable (LANTUS) 10 Unit(s) SubCutaneous at bedtime  insulin lispro (HumaLOG) corrective regimen sliding scale   SubCutaneous every 6 hours    ascorbic acid 500 milliGRAM(s) Oral daily  calcium carbonate 1250 mG  + Vitamin D (OsCal 500 + D) 1 Tablet(s) Oral daily  dextrose 5%. 1000 milliLiter(s) IV Continuous <Continuous>  ferrous    sulfate Liquid 300 milliGRAM(s) Enteral Tube daily      artificial  tears Solution 1 Drop(s) Both EYES three times a day  chlorhexidine 0.12% Liquid 15 milliLiter(s) Oral Mucosa every 12 hours  chlorhexidine 4% Liquid 1 Application(s) Topical <User Schedule>        Mode: CPAP with PS  FiO2: 50  PEEP: 5  PS: 10  ITime: 0.8  MAP: 9  PIP: 16      ICU Vital Signs Last 24 Hrs  T(C): 36.9 (13 May 2020 07:47), Max: 37 (13 May 2020 04:41)  T(F): 98.5 (13 May 2020 07:47), Max: 98.6 (13 May 2020 04:41)  HR: 80 (13 May 2020 07:47) (80 - 101)  BP: 123/78 (13 May 2020 07:47) (123/78 - 152/68)  BP(mean): --  ABP: --  ABP(mean): --  RR: 18 (13 May 2020 07:47) (18 - 19)  SpO2: 96% (13 May 2020 12:56) (95% - 97%)          I&O's Detail    12 May 2020 07:01  -  13 May 2020 07:00  --------------------------------------------------------  IN:    Glucerna 1.5: 30 mL  Total IN: 30 mL    OUT:  Total OUT: 0 mL    Total NET: 30 mL      13 May 2020 07:01  -  13 May 2020 13:36  --------------------------------------------------------  IN:    Glucerna 1.5: 210 mL  Total IN: 210 mL    OUT:  Total OUT: 0 mL    Total NET: 210 mL            LABS:                        10.5   10.20 )-----------( 378      ( 13 May 2020 06:01 )             33.7     05-13    133<L>  |  98  |  23.0<H>  ----------------------------<  169<H>  4.4   |  23.0  |  0.58    Ca    8.7      13 May 2020 06:01  Phos  2.6     05-13  Mg     2.2     05-13            CAPILLARY BLOOD GLUCOSE      POCT Blood Glucose.: 152 mg/dL (13 May 2020 04:15)        CULTURES:      Physical Examination:    General: No acute distress.  unable to communicate or respond to questions    HEENT: Pupils equal, reactive to light.  Symmetric. Trach    PULM: Clear to auscultation bilaterally, no significant sputum production    NECK: Supple, no lymphadenopathy, trachea midline    CVS: Regular rate and rhythm, no murmurs, rubs, or gallops    ABD: Soft, nondistended, nontender, normoactive bowel sounds, no masses, PEG    EXT: No edema, nontender    SKIN: Warm and well perfused, no rashes noted.    NEURO: No change    DEVICES:     RADIOLOGY:   < from: US Duplex Arterial Lower Ext Compl, Bilateral (05.11.20 @ 11:25) >     EXAM:  US DPLX LWR EXT ARTS COMPL BI                          PROCEDURE DATE:  05/11/2020          INTERPRETATION:  US LOWER EXTREMITY ARTERIAL DUPLEX COMPLETE BILATERAL    HISTORY:  COVID. Respiratory failure. Hypoxemia.    Real-time sonography of the bilateral lower extremity arterial system was performed using a high-resolution linear array transducer and including color and spectral Doppler.     The examination was technically limited secondary to difficulty with patient positioning. No occlusion or hemodynamically significant stenosis is seen within the visualized lower extremity arteries. Mild diffuse plaque is seen within the visualized arteries. Peroneal artery is not seen bilaterally.    Flow phase patterns and peak systolic velocity measurements (in cm/s) were observed as follows:    RIGHT:  CFA:  Biphasic; 71   Proximal SFA: Biphasic; 63   Mid SFA:Biphasic; 65   Distal SFA:  Biphasic;  73   Popliteal:  Biphasic;  99   Anterior tibial :  Biphasic; 58   Posterior tibial: Biphasic; 63   Peroneal: Not seen.  Dorsalis pedis: Biphasic;  68     LEFT:  CFA:  Biphasic; 83   Proximal SFA: Biphasic; 84   Mid SFA:Biphasic; 48   Distal SFA:  Biphasic; 55   Popliteal:  Biphasic;  39   Anterior tibial :  Biphasic; 39   Posterior tibial: Biphasic; 31   Peroneal: Not seen.  Dorsalis pedis:  Biphasic;  24     IMPRESSION:    Peroneal arteries not seen bilaterally.    No occlusion or evidence of a hemodynamically significant stenosis in the visualized lower extremity arteries bilaterally.                JUAN TERRY M.D., ATTENDING RADIOLOGIST  This document has been electronically signed. May 11 2020 12:03PM    < end of copied text >  < from: US Duplex Venous Lower Ext Complete, Bilateral (05.11.20 @ 11:11) >   EXAM:  US DPLX LWR EXT VEINS COMPL BI                          PROCEDURE DATE:  05/11/2020          INTERPRETATION:  CLINICAL INFORMATION: Elevated d-dimer. Respiratory failure on ventilator. COVID.    COMPARISON: None available.    TECHNIQUE: Duplex sonography of the BILATERAL LOWER extremity veins with color and spectral Doppler, with and without compression.      FINDINGS:    There is normal compressibility of the bilateral common femoral, femoral and popliteal veins.     Doppler examination shows normal spontaneous and phasic flow.    No calf vein thrombosis is detected.    IMPRESSION:     No evidence of deep venous thrombosis in either lower extremity.                      JUAN TERRY M.D., ATTENDING RADIOLOGIST  This document has been electronically signed. May 11 2020 11:13AM    < end of copied text >  < from: CT Angio Chest w/ IV Cont (05.08.20 @ 14:13) >     EXAM:  CT ANGIO CHEST (W)AW IC                          PROCEDURE DATE:  05/08/2020          INTERPRETATION:  CLINICAL INFORMATION: Covid positive, shortness of breath, rule out PE    COMPARISON: 07/21/2017.    PROCEDURE:   CT Angiography of the Chest.  51 ml of Omnipaque 350 was injected intravenously. 49 ml were discarded.  Sagittal and coronal reformats were performed as well as 3D (MIP) reconstructions.      FINDINGS:    There is good opacification of the pulmonary arteries, however the study is markedly limited by patient motion. There is no saddle PE.    LUNGS AND AIRWAYS: Tracheostomy tube.  There bilateral diffuse patchy opacities.    PLEURA: Trace bilateral pleural effusions with associated atelectasis.    MEDIASTINUM AND JHONNY: The proximal esophagus is dilated and air-filled, similar to prior.    VESSELS: Within normal limits.    HEART: Heart size is normal. Small bowel pericardial effusion.    CHEST WALL AND LOWER NECK: Within normal limits.    VISUALIZED UPPER ABDOMEN: Gastrostomy tube in place.    BONES: Mild degenerative changes of the spine.    IMPRESSION:    Pattern of GGO suggests infection including atypical pneumonia/viral infection from atypical agents including COVID-19 (C19V-1).    Limited study due to respiratory motion. No saddle PE.    Small bilateral pleural effusions and small pericardial effusion.                SANJAY IVERSON M.D., ATTENDING RADIOLOGIST  This document has been electronically signed. May  8 2020  2:45PM    < end of copied text >      CRITICAL CARE TIME SPENT: ***

## 2020-05-13 NOTE — PROGRESS NOTE ADULT - ASSESSMENT
74 yr old female with frontal lobe dementia, stroke, diabetes mellitus, hypertension, functional quadriplegia, chronic respiratory failure on trach collar, s/p PEG was sent in for evaluation  of hypoxia and fever. She is COVID 19 positive and was admitted to ICU, trach changed to cuff and was placed on full mechanical ventilation. She was on pressors, subsequently discontinued and was transferred to medical floor on 5/6/20. Noted to have UTI and sputum cultures growing Pseudomonas. RRT was called on 5/7 for hypoxia, CTA chest was done, negative for pulmonary embolism. CT head was done for concern for altered mental status, negative for stroke. Neurology consulted for evaluation of possible seizure like activity, advised likely secondary to hypoxia and palliative care evaluation was recommended. Cefepime was started for pneumonia. Goals of care discussed with spouse, patient is full code. Pulmonary was consulted for ventilator management. Her FiO2 requirements decreased on the ventilator.

## 2020-05-13 NOTE — PROGRESS NOTE ADULT - SUBJECTIVE AND OBJECTIVE BOX
[facility]  INFECTIOUS DISEASES AND INTERNAL MEDICINE at [place]  =======================================================  Sid Mendoza MD  Diplomates American Board of Internal Medicine and Infectious Diseases  =======================================================    [facility]-[id number]  BREA BECKHAM     Follow up:  acute respiratory failure     NO new changes, seems comfortable. Good O2 sat. on vent trough trach.     PAST MEDICAL & SURGICAL HISTORY:  Quadriplegia  COVID-19 virus detected  Advanced dementia  DM (diabetes mellitus)  HTN (hypertension)  CVA (cerebral vascular accident)  Respiratory failure  Constipation  GERD (gastroesophageal reflux disease)  Hypertension  Respiratory failure  Diabetes mellitus  Aphasic stroke  Dementia of frontal lobe type  Feeding by G-tube  Tracheostomy tube present  Tracheostomy in place  Gastrostomy in place  Hx of appendectomy    Social Hx: no smoking    FAMILY HISTORY:  No pertinent family history in first degree relatives    Allergies  codeine (Hives)    Antibiotics:   cefepime   IVPB 2000 milliGRAM(s) IV Intermittent once     REVIEW OF SYSTEMS:  Nonverbal     Physical Exam:  Vital Signs Last 24 Hrs  T(C): 36.9 (13 May 2020 07:47), Max: 37 (13 May 2020 04:41)  T(F): 98.5 (13 May 2020 07:47), Max: 98.6 (13 May 2020 04:41)  HR: 80 (13 May 2020 07:47) (80 - 101)  BP: 123/78 (13 May 2020 07:47) (123/78 - 152/68)  BP(mean): --  RR: 18 (13 May 2020 07:47) (18 - 19)  SpO2: 96% (13 May 2020 12:56) (95% - 97%)  GEN: NAD  HEENT: normocephalic and atraumatic.   NECK: Supple.  No lymphadenopathy   LUNGS: Clear to auscultation.  HEART: Regular rate and rhythm   ABDOMEN: Soft, nontender, and nondistended.  Positive bowel sounds. +PEG  EXTREMITIES: Without edema.  NEUROLOGIC: Contracted all extremities   PSYCHIATRIC: Awake but not communicative   SKIN: No ulceration or induration present.    Labs:  05-13    133<L>  |  98  |  23.0<H>  ----------------------------<  169<H>  4.4   |  23.0  |  0.58    Ca    8.7      13 May 2020 06:01  Phos  2.6     05-13  Mg     2.2     05-13                 10.5   10.20 )-----------( 378      ( 13 May 2020 06:01 )             33.7     RECENT CULTURES:  05-05 @ 17:02 .Sputum Sputum Pseudomonas aeruginosa    Few Pseudomonas aeruginosa  Normal Respiratory Elvira present    Numerous polymorphonuclear leukocytes seen per low power field  Few Squamous epithelial cells seen per low power field  Moderate Gram Positive Rods seen per oil power field  Few Gram Negative Rods seen per oil power field  Few Gram positive cocci in pairs, chains and clusters seen per oil power  field    05-04 @ 21:22 .Urine Clean Catch (Midstream) Proteus mirabilis    >100,000 CFU/ml Proteus mirabilis    05-04 @ 14:01 .Blood Blood-Peripheral     No growth at 5 days.      All imaging and other data have been reviewed.  < from: CT Angio Chest w/ IV Cont (05.08.20 @ 14:13) >  EXAM:  CT ANGIO CHEST (W)AW IC                        PROCEDURE DATE:  05/08/2020    INTERPRETATION:  CLINICAL INFORMATION: Covid positive, shortness of breath, rule out PE  COMPARISON: 07/21/2017.  PROCEDURE:   CT Angiography of the Chest.  51 ml of Omnipaque 350 was injected intravenously. 49 ml were discarded.  Sagittal and coronal reformats were performed as well as 3D (MIP) reconstructions.  FINDINGS:  There is good opacification of the pulmonary arteries, however the study is markedly limited by patient motion. There is no saddle PE.  LUNGS AND AIRWAYS: Tracheostomy tube.  There bilateral diffuse patchy opacities.  PLEURA: Trace bilateral pleural effusions with associated atelectasis.  MEDIASTINUM AND JHONNY: The proximal esophagus is dilated and air-filled, similar to prior.  VESSELS: Within normal limits.  HEART: Heart size is normal. Small bowel pericardial effusion.  CHEST WALL AND LOWER NECK: Within normal limits.  VISUALIZED UPPER ABDOMEN: Gastrostomy tube in place.  BONES: Mild degenerative changes of the spine.  IMPRESSION:  Pattern of GGO suggests infection including atypical pneumonia/viral infection from atypical agents including COVID-19 (C19V-1).  Limited study due to respiratory motion. No saddle PE.  Small bilateral pleural effusions and small pericardial effusion.    Assessment and Plan:   73 y/o woman with with hx of frontal lobe dementia, stroke, functional quadriplegia, tracheostomy normally on trach collar, dysphagia s/p PEG, nursing home patient was admitted on 5/4 with with fever and worsening hypoxemia. She tested positive for COVID19 in the nursing home. Sputum culture showed pseudomonas and urine proteus so ID was called for recommendations. Patient is awake but not communicative. Due to worsening of respiration and higher FIO2 requirement and also possible seizure, CTA and head CT were done.    COVID 19 + infection  Viral pneumonia  Acute hypoxic respiratory failure  Bacterial pneumonia  UTI  Stroke  Chronic trach and PEG    - Airborne and contact isolation   - COVID 19 PCR positive   - CXR with multifocal opacities   - Procalcitonin 30.47  - CRP=36.32, Vmbdbhlp=679 and SIO=802  - Blood culture negative  - Leukocytosis 15k-->10k  - Sputum culture with Pseudomonas pansensitive   - UA negative but urine culture with proteus that could be contamination   - Continue Cefepime 2gm q12 to cover sputum and urine both   - Last day wound be 5/21.   - Midline has been ordered.     Will follow PRN pleases call with any question.

## 2020-05-13 NOTE — PROGRESS NOTE ADULT - SUBJECTIVE AND OBJECTIVE BOX
INTERVAL HPI/OVERNIGHT EVENTS:    CC:  acute hypoxic respiratory failure sec COVID 19 infection and Pseudomonas pneumonia, frontal lobe dementia, h/o stroke, diabetes mellitus, hypertension, functional quadriplegia      No overnight events, afebrile. CPAP trials.     Vital Signs Last 24 Hrs  T(C): 36.9 (13 May 2020 07:47), Max: 37 (13 May 2020 04:41)  T(F): 98.5 (13 May 2020 07:47), Max: 98.6 (13 May 2020 04:41)  HR: 80 (13 May 2020 07:47) (80 - 101)  BP: 123/78 (13 May 2020 07:47) (123/78 - 152/68)  BP(mean): --  RR: 18 (13 May 2020 07:47) (18 - 19)  SpO2: 97% (13 May 2020 10:26) (95% - 97%)    PHYSICAL EXAM:    GENERAL: not in distress, s/p trach  CHEST/LUNG: b/l air entry  HEART: regular  ABDOMEN: soft, non tender  EXTREMITIES:  no edema, tenderness    MEDICATIONS  (STANDING):  artificial  tears Solution 1 Drop(s) Both EYES three times a day  ascorbic acid 500 milliGRAM(s) Oral daily  calcium carbonate 1250 mG  + Vitamin D (OsCal 500 + D) 1 Tablet(s) Oral daily  cefepime   IVPB      cefepime   IVPB 2000 milliGRAM(s) IV Intermittent every 12 hours  chlorhexidine 0.12% Liquid 15 milliLiter(s) Oral Mucosa every 12 hours  chlorhexidine 4% Liquid 1 Application(s) Topical <User Schedule>  dextrose 5%. 1000 milliLiter(s) (50 mL/Hr) IV Continuous <Continuous>  dextrose 50% Injectable 12.5 Gram(s) IV Push once  dextrose 50% Injectable 25 Gram(s) IV Push once  dextrose 50% Injectable 25 Gram(s) IV Push once  enoxaparin Injectable 40 milliGRAM(s) SubCutaneous daily  fentaNYL   Patch  12 MICROgram(s)/Hr 1 Patch Transdermal every 72 hours  ferrous    sulfate Liquid 300 milliGRAM(s) Enteral Tube daily  insulin glargine Injectable (LANTUS) 10 Unit(s) SubCutaneous every morning  insulin glargine Injectable (LANTUS) 10 Unit(s) SubCutaneous at bedtime  insulin lispro (HumaLOG) corrective regimen sliding scale   SubCutaneous every 6 hours  montelukast 10 milliGRAM(s) Oral daily  pantoprazole   Suspension 40 milliGRAM(s) Oral daily  polyethylene glycol 3350 17 Gram(s) Oral daily  senna 2 Tablet(s) Oral daily    MEDICATIONS  (PRN):  acetaminophen    Suspension .. 650 milliGRAM(s) Oral every 6 hours PRN Temp greater or equal to 38C (100.4F)  dextrose 40% Gel 15 Gram(s) Oral once PRN Blood Glucose LESS THAN 70 milliGRAM(s)/deciliter  glucagon  Injectable 1 milliGRAM(s) IntraMuscular once PRN Glucose LESS THAN 70 milligrams/deciliter  LORazepam   Injectable 0.5 milliGRAM(s) IV Push every 4 hours PRN tachypnea      Allergies    codeine (Hives)    Intolerances          LABS:                          10.5   10.20 )-----------( 378      ( 13 May 2020 06:01 )             33.7     05-13    133<L>  |  98  |  23.0<H>  ----------------------------<  169<H>  4.4   |  23.0  |  0.58    Ca    8.7      13 May 2020 06:01  Phos  2.6     05-13  Mg     2.2     05-13            RADIOLOGY & ADDITIONAL TESTS:

## 2020-05-13 NOTE — PROGRESS NOTE ADULT - ASSESSMENT
79-year-old female with a history of dementia, CVA, chronic tracheostomy secondary to respiratory failure, and mental status, seen today on mechanical ventilation for further weaning. Lung involvement from virus significant on CAT scan. No evidence of pulmonary embolus. Weaning well to CPAP PSV. Prognosis is terminal.    Plan:  #1 patient is stable to transfer to a skilled nursing facility/the [facility] facility for further weaning.  #2 continue tube feeds.  #3 palliative care and possible consideration for terminal wean as per family decisions.

## 2020-05-13 NOTE — PROGRESS NOTE ADULT - PROBLEM SELECTOR PLAN 1
Sec COVID 19 infection and bacterial pneumonia, continue ventilator support.  Continue Cefepime. Pulmonary follow up for ventilator management, weaning trials. Daughter wants patient's FiO2 to be 30% prior to discharge. SW following, regarding facility's ability to manage vent settings.

## 2020-05-14 LAB
ANION GAP SERPL CALC-SCNC: 10 MMOL/L — SIGNIFICANT CHANGE UP (ref 5–17)
BUN SERPL-MCNC: 25 MG/DL — HIGH (ref 8–20)
CALCIUM SERPL-MCNC: 8.8 MG/DL — SIGNIFICANT CHANGE UP (ref 8.6–10.2)
CHLORIDE SERPL-SCNC: 102 MMOL/L — SIGNIFICANT CHANGE UP (ref 98–107)
CO2 SERPL-SCNC: 22 MMOL/L — SIGNIFICANT CHANGE UP (ref 22–29)
CREAT SERPL-MCNC: 0.76 MG/DL — SIGNIFICANT CHANGE UP (ref 0.5–1.3)
GLUCOSE BLDC GLUCOMTR-MCNC: 100 MG/DL — HIGH (ref 70–99)
GLUCOSE BLDC GLUCOMTR-MCNC: 223 MG/DL — HIGH (ref 70–99)
GLUCOSE BLDC GLUCOMTR-MCNC: 95 MG/DL — SIGNIFICANT CHANGE UP (ref 70–99)
GLUCOSE BLDC GLUCOMTR-MCNC: 99 MG/DL — SIGNIFICANT CHANGE UP (ref 70–99)
GLUCOSE SERPL-MCNC: 107 MG/DL — HIGH (ref 70–99)
HCT VFR BLD CALC: 36.1 % — SIGNIFICANT CHANGE UP (ref 34.5–45)
HGB BLD-MCNC: 11.1 G/DL — LOW (ref 11.5–15.5)
MAGNESIUM SERPL-MCNC: 2.3 MG/DL — SIGNIFICANT CHANGE UP (ref 1.6–2.6)
MCHC RBC-ENTMCNC: 27.9 PG — SIGNIFICANT CHANGE UP (ref 27–34)
MCHC RBC-ENTMCNC: 30.7 GM/DL — LOW (ref 32–36)
MCV RBC AUTO: 90.7 FL — SIGNIFICANT CHANGE UP (ref 80–100)
PLATELET # BLD AUTO: 344 K/UL — SIGNIFICANT CHANGE UP (ref 150–400)
POTASSIUM SERPL-MCNC: 4.6 MMOL/L — SIGNIFICANT CHANGE UP (ref 3.5–5.3)
POTASSIUM SERPL-SCNC: 4.6 MMOL/L — SIGNIFICANT CHANGE UP (ref 3.5–5.3)
RBC # BLD: 3.98 M/UL — SIGNIFICANT CHANGE UP (ref 3.8–5.2)
RBC # FLD: 15.6 % — HIGH (ref 10.3–14.5)
SODIUM SERPL-SCNC: 134 MMOL/L — LOW (ref 135–145)
WBC # BLD: 15.22 K/UL — HIGH (ref 3.8–10.5)
WBC # FLD AUTO: 15.22 K/UL — HIGH (ref 3.8–10.5)

## 2020-05-14 PROCEDURE — 99232 SBSQ HOSP IP/OBS MODERATE 35: CPT | Mod: CS

## 2020-05-14 PROCEDURE — 99233 SBSQ HOSP IP/OBS HIGH 50: CPT | Mod: CS

## 2020-05-14 RX ADMIN — INSULIN GLARGINE 10 UNIT(S): 100 INJECTION, SOLUTION SUBCUTANEOUS at 07:39

## 2020-05-14 RX ADMIN — CHLORHEXIDINE GLUCONATE 15 MILLILITER(S): 213 SOLUTION TOPICAL at 04:05

## 2020-05-14 RX ADMIN — CEFEPIME 100 MILLIGRAM(S): 1 INJECTION, POWDER, FOR SOLUTION INTRAMUSCULAR; INTRAVENOUS at 16:45

## 2020-05-14 RX ADMIN — CEFEPIME 100 MILLIGRAM(S): 1 INJECTION, POWDER, FOR SOLUTION INTRAMUSCULAR; INTRAVENOUS at 04:05

## 2020-05-14 RX ADMIN — Medication 1 TABLET(S): at 13:09

## 2020-05-14 RX ADMIN — Medication 0.5 MILLIGRAM(S): at 07:45

## 2020-05-14 RX ADMIN — Medication 4: at 05:30

## 2020-05-14 RX ADMIN — Medication 1 DROP(S): at 04:06

## 2020-05-14 RX ADMIN — Medication 650 MILLIGRAM(S): at 07:39

## 2020-05-14 RX ADMIN — Medication 1 DROP(S): at 22:26

## 2020-05-14 RX ADMIN — Medication 0.5 MILLIGRAM(S): at 16:55

## 2020-05-14 RX ADMIN — Medication 1 DROP(S): at 13:10

## 2020-05-14 RX ADMIN — FENTANYL CITRATE 1 PATCH: 50 INJECTION INTRAVENOUS at 09:28

## 2020-05-14 RX ADMIN — POLYETHYLENE GLYCOL 3350 17 GRAM(S): 17 POWDER, FOR SOLUTION ORAL at 13:09

## 2020-05-14 RX ADMIN — Medication 0.5 MILLIGRAM(S): at 22:35

## 2020-05-14 RX ADMIN — Medication 500 MILLIGRAM(S): at 13:09

## 2020-05-14 RX ADMIN — Medication 0.5 MILLIGRAM(S): at 03:15

## 2020-05-14 RX ADMIN — ENOXAPARIN SODIUM 40 MILLIGRAM(S): 100 INJECTION SUBCUTANEOUS at 13:09

## 2020-05-14 RX ADMIN — MONTELUKAST 10 MILLIGRAM(S): 4 TABLET, CHEWABLE ORAL at 13:10

## 2020-05-14 RX ADMIN — SENNA PLUS 2 TABLET(S): 8.6 TABLET ORAL at 13:09

## 2020-05-14 RX ADMIN — PANTOPRAZOLE SODIUM 40 MILLIGRAM(S): 20 TABLET, DELAYED RELEASE ORAL at 13:09

## 2020-05-14 RX ADMIN — Medication 300 MILLIGRAM(S): at 13:09

## 2020-05-14 RX ADMIN — INSULIN GLARGINE 10 UNIT(S): 100 INJECTION, SOLUTION SUBCUTANEOUS at 23:17

## 2020-05-14 RX ADMIN — CHLORHEXIDINE GLUCONATE 15 MILLILITER(S): 213 SOLUTION TOPICAL at 16:45

## 2020-05-14 RX ADMIN — FENTANYL CITRATE 1 PATCH: 50 INJECTION INTRAVENOUS at 19:33

## 2020-05-14 RX ADMIN — CHLORHEXIDINE GLUCONATE 1 APPLICATION(S): 213 SOLUTION TOPICAL at 04:07

## 2020-05-14 NOTE — PROGRESS NOTE ADULT - SUBJECTIVE AND OBJECTIVE BOX
INTERVAL HPI/OVERNIGHT EVENTS:    CC:  acute hypoxic respiratory failure sec COVID 19 infection and Pseudomonas pneumonia, frontal lobe dementia, h/o stroke, diabetes mellitus, hypertension, functional quadriplegia    Low grade temp early this morning 100.7 and tachy at times  Discussed with ID, will observe, repeat cbc. If spikes again will panculture  Pt seen and examined at bedside, laying in bed sleeping, did not awaken  Appears comfortable     Vital Signs Last 24 Hrs  T(C): 38.2 (14 May 2020 07:46), Max: 38.2 (14 May 2020 07:46)  T(F): 100.7 (14 May 2020 07:46), Max: 100.7 (14 May 2020 07:46)  HR: 119 (14 May 2020 07:46) (75 - 132)  BP: 116/63 (14 May 2020 07:46) (116/63 - 174/110)  BP(mean): --  RR: 24 (14 May 2020 07:46) (18 - 30)  SpO2: 96% (14 May 2020 07:46) (90% - 99%) on vent/AC    PHYSICAL EXAM:  GENERAL: not in distress, s/p trach  CHEST/LUNG: b/l air entry  HEART: regular  ABDOMEN: soft, non tender  EXTREMITIES:  no edema, tenderness    LABS/IMAGING: REVIEWED

## 2020-05-14 NOTE — PROGRESS NOTE ADULT - PROBLEM SELECTOR PLAN 1
Sec COVID 19 infection and bacterial pneumonia  CTA neg for PE  Continue ventilator support and weaning trials as able   Continue Cefepime as per ID recs   Pulm consult appreciated, stable to transfer to a skilled nursing facility/the vent facility for further weaning  Daughter wants patient's FiO2 to be 30% prior to discharge  SW following, regarding facility's ability to manage vent settings Sec COVID 19 infection and bacterial pneumonia  CTA neg for PE  Chronic trach  Continue ventilator support and weaning trials as able   Continue Cefepime as per ID recs   Pulm consult appreciated, stable to transfer to a skilled nursing facility/the vent facility for further weaning  Daughter wants patient's FiO2 to be 30% prior to discharge  SW following, regarding facility's ability to manage vent settings

## 2020-05-14 NOTE — PROGRESS NOTE ADULT - SUBJECTIVE AND OBJECTIVE BOX
Patient is a 74y old  Female who presents with a chief complaint of acute respiratory failure (14 May 2020 10:54)      BRIEF HOSPITAL COURSE: 74-year-old female with a history of frontal lobe dementia, stroke, functional quadriplegia, chronic tracheostomy, dysphasia, with chronic gastrostomy tube admitted on 4/27 with Covid 19 requiring mechanical ventilation. Patient transferred to [facility] for further weaning. Currently on full dose anticoagulation, steroids, cefepime. Unable to communicate    Events last 24 hours: Remains on CPAP 5/PSV 10 on 30% with 94% sats. No distress    PAST MEDICAL & SURGICAL HISTORY:  Quadriplegia  COVID-19 virus detected  Advanced dementia  DM (diabetes mellitus)  HTN (hypertension)  CVA (cerebral vascular accident)  Respiratory failure  Constipation  GERD (gastroesophageal reflux disease)  Hypertension  Respiratory failure  Diabetes mellitus  Aphasic stroke  Dementia of frontal lobe type  Feeding by G-tube  Tracheostomy tube present  Tracheostomy in place  Gastrostomy in place  Hx of appendectomy        Medications:  cefepime   IVPB      cefepime   IVPB 2000 milliGRAM(s) IV Intermittent every 12 hours      montelukast 10 milliGRAM(s) Oral daily    acetaminophen    Suspension .. 650 milliGRAM(s) Oral every 6 hours PRN  fentaNYL   Patch  12 MICROgram(s)/Hr 1 Patch Transdermal every 72 hours  LORazepam   Injectable 0.5 milliGRAM(s) IV Push every 4 hours PRN      enoxaparin Injectable 40 milliGRAM(s) SubCutaneous daily    pantoprazole   Suspension 40 milliGRAM(s) Oral daily  polyethylene glycol 3350 17 Gram(s) Oral daily  senna 2 Tablet(s) Oral daily      dextrose 40% Gel 15 Gram(s) Oral once PRN  dextrose 50% Injectable 12.5 Gram(s) IV Push once  dextrose 50% Injectable 25 Gram(s) IV Push once  dextrose 50% Injectable 25 Gram(s) IV Push once  glucagon  Injectable 1 milliGRAM(s) IntraMuscular once PRN  insulin glargine Injectable (LANTUS) 10 Unit(s) SubCutaneous every morning  insulin glargine Injectable (LANTUS) 10 Unit(s) SubCutaneous at bedtime  insulin lispro (HumaLOG) corrective regimen sliding scale   SubCutaneous every 6 hours    ascorbic acid 500 milliGRAM(s) Oral daily  calcium carbonate 1250 mG  + Vitamin D (OsCal 500 + D) 1 Tablet(s) Oral daily  dextrose 5%. 1000 milliLiter(s) IV Continuous <Continuous>  ferrous    sulfate Liquid 300 milliGRAM(s) Enteral Tube daily      artificial  tears Solution 1 Drop(s) Both EYES three times a day  chlorhexidine 0.12% Liquid 15 milliLiter(s) Oral Mucosa every 12 hours  chlorhexidine 4% Liquid 1 Application(s) Topical <User Schedule>        Mode: AC/ CMV (Assist Control/ Continuous Mandatory Ventilation)  RR (machine): 14  TV (machine): 380  FiO2: 50  PEEP: 5  ITime: 0.8  MAP: 10  PIP: 31      ICU Vital Signs Last 24 Hrs  T(C): 38.2 (14 May 2020 07:46), Max: 38.2 (14 May 2020 07:46)  T(F): 100.7 (14 May 2020 07:46), Max: 100.7 (14 May 2020 07:46)  HR: 119 (14 May 2020 07:46) (75 - 132)  BP: 116/63 (14 May 2020 07:46) (116/63 - 174/110)  BP(mean): --  ABP: --  ABP(mean): --  RR: 24 (14 May 2020 07:46) (18 - 30)  SpO2: 96% (14 May 2020 07:46) (90% - 99%)          I&O's Detail    13 May 2020 07:01  -  14 May 2020 07:00  --------------------------------------------------------  IN:    Glucerna 1.5: 330 mL  Total IN: 330 mL    OUT:  Total OUT: 0 mL    Total NET: 330 mL            LABS:                        11.1   15.22 )-----------( 344      ( 14 May 2020 11:24 )             36.1     05-14    134<L>  |  102  |  25.0<H>  ----------------------------<  107<H>  4.6   |  22.0  |  0.76    Ca    8.8      14 May 2020 11:24  Phos  2.6     05-13  Mg     2.3     05-14            CAPILLARY BLOOD GLUCOSE      POCT Blood Glucose.: 99 mg/dL (14 May 2020 11:37)        CULTURES:      Physical Examination:    General: No acute distress.  responsive to noxious stimuli    HEENT: Pupils equal, reactive to light.  Symmetric.    PULM: Clear to auscultation bilaterally, no significant sputum production    NECK: Supple, no lymphadenopathy, trachea midline, intubated    CVS: Regular rate and rhythm, no murmurs, rubs, or gallops    ABD: Soft, nondistended, nontender, normoactive bowel sounds, no masses    EXT: No edema, nontender    SKIN: Warm and well perfused, no rashes noted.    NEURO: without change    DEVICES:     RADIOLOGY: reviewed

## 2020-05-14 NOTE — PROGRESS NOTE ADULT - ATTENDING COMMENTS
Patient seen and examined. No overnight events except for low grade fever this morning. Agree with above A/P.  weaning FiO2 as tolerated.  Check CBC today. Monitor fever profile.  continue Cefepime.  Monitor 24 hrs.  discussed with RN, PA and social work. Patient seen and examined. No overnight events except for low grade fever this morning. Agree with above A/P.  weaning FiO2 as tolerated.  Check CBC today. Monitor fever profile.  continue Cefepime.  Monitor 24 hrs.  discussed with RN, PA and social work.  updated daughter plan of care.

## 2020-05-14 NOTE — PROGRESS NOTE ADULT - SUBJECTIVE AND OBJECTIVE BOX
[facility]  INFECTIOUS DISEASES AND INTERNAL MEDICINE at [place]  =======================================================  Sid Mendoza MD  Diplomates American Board of Internal Medicine and Infectious Diseases  =======================================================    [facility]-[id number]  BREA BECKHAM     Follow up:  acute respiratory failure     Low grade fever and leukocytosis again, seems comfortable. Good O2 sat. on vent trough trach.     PAST MEDICAL & SURGICAL HISTORY:  Quadriplegia  COVID-19 virus detected  Advanced dementia  DM (diabetes mellitus)  HTN (hypertension)  CVA (cerebral vascular accident)  Respiratory failure  Constipation  GERD (gastroesophageal reflux disease)  Hypertension  Respiratory failure  Diabetes mellitus  Aphasic stroke  Dementia of frontal lobe type  Feeding by G-tube  Tracheostomy tube present  Tracheostomy in place  Gastrostomy in place  Hx of appendectomy    Social Hx: no smoking    FAMILY HISTORY:  No pertinent family history in first degree relatives    Allergies  codeine (Hives)    Antibiotics:   cefepime   IVPB 2000 milliGRAM(s) IV Intermittent once     REVIEW OF SYSTEMS:  Nonverbal     Physical Exam:    Vital Signs Last 24 Hrs  T(C): 36.9 (14 May 2020 15:39), Max: 38.2 (14 May 2020 07:46)  T(F): 98.4 (14 May 2020 15:39), Max: 100.7 (14 May 2020 07:46)  HR: 101 (14 May 2020 15:39) (75 - 132)  BP: 134/68 (14 May 2020 15:39) (116/63 - 174/110)  BP(mean): --  RR: 24 (14 May 2020 15:39) (18 - 30)  SpO2: 94% (14 May 2020 15:39) (90% - 99%)  GEN: NAD  HEENT: normocephalic and atraumatic.   NECK: Supple.  No lymphadenopathy   LUNGS: Clear to auscultation.  HEART: Regular rate and rhythm   ABDOMEN: Soft, nontender, and nondistended.  Positive bowel sounds. +PEG  EXTREMITIES: Without edema.  NEUROLOGIC: Contracted all extremities   PSYCHIATRIC: Awake but not communicative   SKIN: No ulceration or induration present.    Labs:  05-14    134<L>  |  102  |  25.0<H>  ----------------------------<  107<H>  4.6   |  22.0  |  0.76    Ca    8.8      14 May 2020 11:24  Phos  2.6     05-13  Mg     2.3     05-14                            11.1   15.22 )-----------( 344      ( 14 May 2020 11:24 )             36    RECENT CULTURES:  05-05 @ 17:02 .Sputum Sputum Pseudomonas aeruginosa    Few Pseudomonas aeruginosa  Normal Respiratory Elvira present    Numerous polymorphonuclear leukocytes seen per low power field  Few Squamous epithelial cells seen per low power field  Moderate Gram Positive Rods seen per oil power field  Few Gram Negative Rods seen per oil power field  Few Gram positive cocci in pairs, chains and clusters seen per oil power  field      05-04 @ 21:22 .Urine Clean Catch (Midstream) Proteus mirabilis    >100,000 CFU/ml Proteus mirabili    05-04 @ 14:01 .Blood Blood-Peripheral     No growth at 5 days.    All imaging and other data have been reviewed.  < from: CT Angio Chest w/ IV Cont (05.08.20 @ 14:13) >  EXAM:  CT ANGIO CHEST (W)AW IC                        PROCEDURE DATE:  05/08/2020    INTERPRETATION:  CLINICAL INFORMATION: Covid positive, shortness of breath, rule out PE  COMPARISON: 07/21/2017.  PROCEDURE:   CT Angiography of the Chest.  51 ml of Omnipaque 350 was injected intravenously. 49 ml were discarded.  Sagittal and coronal reformats were performed as well as 3D (MIP) reconstructions.  FINDINGS:  There is good opacification of the pulmonary arteries, however the study is markedly limited by patient motion. There is no saddle PE.  LUNGS AND AIRWAYS: Tracheostomy tube.  There bilateral diffuse patchy opacities.  PLEURA: Trace bilateral pleural effusions with associated atelectasis.  MEDIASTINUM AND JHONNY: The proximal esophagus is dilated and air-filled, similar to prior.  VESSELS: Within normal limits.  HEART: Heart size is normal. Small bowel pericardial effusion.  CHEST WALL AND LOWER NECK: Within normal limits.  VISUALIZED UPPER ABDOMEN: Gastrostomy tube in place.  BONES: Mild degenerative changes of the spine.  IMPRESSION:  Pattern of GGO suggests infection including atypical pneumonia/viral infection from atypical agents including COVID-19 (C19V-1).  Limited study due to respiratory motion. No saddle PE.  Small bilateral pleural effusions and small pericardial effusion.    Assessment and Plan:   73 y/o woman with with hx of frontal lobe dementia, stroke, functional quadriplegia, tracheostomy normally on trach collar, dysphagia s/p PEG, nursing home patient was admitted on 5/4 with with fever and worsening hypoxemia. She tested positive for COVID19 in the nursing home. Sputum culture showed pseudomonas and urine proteus so ID was called for recommendations. Patient is awake but not communicative. Due to worsening of respiration and higher FIO2 requirement and also possible seizure, CTA and head CT were done.  New fever, will not  change antibiotics but will send send cultures.     COVID 19 + infection  Viral pneumonia  Acute hypoxic respiratory failure  Bacterial pneumonia  UTI  Stroke  Chronic trach and PEG    - Airborne and contact isolation   - COVID 19 PCR positive   - CXR with multifocal opacities   - Procalcitonin 30.47  - CRP=36.32, Eryvwsxb=379 and PWC=750  - Blood culture negative, will repeat it.   - Leukocytosis 15k-->10k  - Sputum culture with Pseudomonas pansensitive , will repeat it.   - UA negative but urine culture with proteus that could be contamination   - Continue Cefepime 2gm q12 to cover sputum and urine both   - Last day wound be 5/21.   - WIll follow Repeat CBC and panculture     Will follow.

## 2020-05-14 NOTE — PROGRESS NOTE ADULT - ASSESSMENT
79-year-old female with a history of dementia, CVA, chronic tracheostomy secondary to respiratory failure, and mental status, seen today on mechanical ventilation for further weaning. Lung involvement from virus significant on CAT scan. No evidence of pulmonary embolus. Weaning well to CPAP PSV. Prognosis is terminal.    Plan:  #1 patient is stable to transfer to a skilled nursing facility/the [facility] facility for further weaning.  #2 continue tube feeds.  #3 palliative care and possible consideration for terminal wean as per family decisions.

## 2020-05-15 ENCOUNTER — TRANSCRIPTION ENCOUNTER (OUTPATIENT)
Age: 75
End: 2020-05-15

## 2020-05-15 LAB
ANION GAP SERPL CALC-SCNC: 12 MMOL/L — SIGNIFICANT CHANGE UP (ref 5–17)
BUN SERPL-MCNC: 20 MG/DL — SIGNIFICANT CHANGE UP (ref 8–20)
CALCIUM SERPL-MCNC: 8.8 MG/DL — SIGNIFICANT CHANGE UP (ref 8.6–10.2)
CHLORIDE SERPL-SCNC: 103 MMOL/L — SIGNIFICANT CHANGE UP (ref 98–107)
CO2 SERPL-SCNC: 23 MMOL/L — SIGNIFICANT CHANGE UP (ref 22–29)
CREAT SERPL-MCNC: 0.71 MG/DL — SIGNIFICANT CHANGE UP (ref 0.5–1.3)
CULTURE RESULTS: NO GROWTH — SIGNIFICANT CHANGE UP
GLUCOSE BLDC GLUCOMTR-MCNC: 101 MG/DL — HIGH (ref 70–99)
GLUCOSE BLDC GLUCOMTR-MCNC: 108 MG/DL — HIGH (ref 70–99)
GLUCOSE BLDC GLUCOMTR-MCNC: 128 MG/DL — HIGH (ref 70–99)
GLUCOSE BLDC GLUCOMTR-MCNC: 92 MG/DL — SIGNIFICANT CHANGE UP (ref 70–99)
GLUCOSE SERPL-MCNC: 124 MG/DL — HIGH (ref 70–99)
GRAM STN FLD: SIGNIFICANT CHANGE UP
HCT VFR BLD CALC: 38.2 % — SIGNIFICANT CHANGE UP (ref 34.5–45)
HGB BLD-MCNC: 11.3 G/DL — LOW (ref 11.5–15.5)
MAGNESIUM SERPL-MCNC: 2.1 MG/DL — SIGNIFICANT CHANGE UP (ref 1.6–2.6)
MCHC RBC-ENTMCNC: 27.3 PG — SIGNIFICANT CHANGE UP (ref 27–34)
MCHC RBC-ENTMCNC: 29.6 GM/DL — LOW (ref 32–36)
MCV RBC AUTO: 92.3 FL — SIGNIFICANT CHANGE UP (ref 80–100)
PLATELET # BLD AUTO: 334 K/UL — SIGNIFICANT CHANGE UP (ref 150–400)
POTASSIUM SERPL-MCNC: 4.4 MMOL/L — SIGNIFICANT CHANGE UP (ref 3.5–5.3)
POTASSIUM SERPL-SCNC: 4.4 MMOL/L — SIGNIFICANT CHANGE UP (ref 3.5–5.3)
RBC # BLD: 4.14 M/UL — SIGNIFICANT CHANGE UP (ref 3.8–5.2)
RBC # FLD: 15.9 % — HIGH (ref 10.3–14.5)
SODIUM SERPL-SCNC: 138 MMOL/L — SIGNIFICANT CHANGE UP (ref 135–145)
SPECIMEN SOURCE: SIGNIFICANT CHANGE UP
SPECIMEN SOURCE: SIGNIFICANT CHANGE UP
WBC # BLD: 9.47 K/UL — SIGNIFICANT CHANGE UP (ref 3.8–10.5)
WBC # FLD AUTO: 9.47 K/UL — SIGNIFICANT CHANGE UP (ref 3.8–10.5)

## 2020-05-15 PROCEDURE — 99232 SBSQ HOSP IP/OBS MODERATE 35: CPT | Mod: CS

## 2020-05-15 RX ADMIN — CHLORHEXIDINE GLUCONATE 1 APPLICATION(S): 213 SOLUTION TOPICAL at 04:55

## 2020-05-15 RX ADMIN — CHLORHEXIDINE GLUCONATE 15 MILLILITER(S): 213 SOLUTION TOPICAL at 04:55

## 2020-05-15 RX ADMIN — FENTANYL CITRATE 1 PATCH: 50 INJECTION INTRAVENOUS at 21:21

## 2020-05-15 RX ADMIN — MONTELUKAST 10 MILLIGRAM(S): 4 TABLET, CHEWABLE ORAL at 12:00

## 2020-05-15 RX ADMIN — Medication 0.5 MILLIGRAM(S): at 13:25

## 2020-05-15 RX ADMIN — PANTOPRAZOLE SODIUM 40 MILLIGRAM(S): 20 TABLET, DELAYED RELEASE ORAL at 12:00

## 2020-05-15 RX ADMIN — CEFEPIME 100 MILLIGRAM(S): 1 INJECTION, POWDER, FOR SOLUTION INTRAMUSCULAR; INTRAVENOUS at 04:55

## 2020-05-15 RX ADMIN — FENTANYL CITRATE 1 PATCH: 50 INJECTION INTRAVENOUS at 07:26

## 2020-05-15 RX ADMIN — FENTANYL CITRATE 1 PATCH: 50 INJECTION INTRAVENOUS at 19:21

## 2020-05-15 RX ADMIN — CHLORHEXIDINE GLUCONATE 15 MILLILITER(S): 213 SOLUTION TOPICAL at 16:38

## 2020-05-15 RX ADMIN — POLYETHYLENE GLYCOL 3350 17 GRAM(S): 17 POWDER, FOR SOLUTION ORAL at 12:01

## 2020-05-15 RX ADMIN — Medication 1 DROP(S): at 21:30

## 2020-05-15 RX ADMIN — Medication 1 DROP(S): at 12:01

## 2020-05-15 RX ADMIN — INSULIN GLARGINE 10 UNIT(S): 100 INJECTION, SOLUTION SUBCUTANEOUS at 21:26

## 2020-05-15 RX ADMIN — Medication 1 DROP(S): at 04:55

## 2020-05-15 RX ADMIN — FENTANYL CITRATE 1 PATCH: 50 INJECTION INTRAVENOUS at 21:22

## 2020-05-15 RX ADMIN — SENNA PLUS 2 TABLET(S): 8.6 TABLET ORAL at 12:00

## 2020-05-15 RX ADMIN — ENOXAPARIN SODIUM 40 MILLIGRAM(S): 100 INJECTION SUBCUTANEOUS at 12:00

## 2020-05-15 RX ADMIN — INSULIN GLARGINE 10 UNIT(S): 100 INJECTION, SOLUTION SUBCUTANEOUS at 09:18

## 2020-05-15 RX ADMIN — Medication 500 MILLIGRAM(S): at 11:59

## 2020-05-15 RX ADMIN — Medication 1 TABLET(S): at 12:00

## 2020-05-15 RX ADMIN — Medication 300 MILLIGRAM(S): at 12:01

## 2020-05-15 RX ADMIN — CEFEPIME 100 MILLIGRAM(S): 1 INJECTION, POWDER, FOR SOLUTION INTRAMUSCULAR; INTRAVENOUS at 16:38

## 2020-05-15 NOTE — PROGRESS NOTE ADULT - SUBJECTIVE AND OBJECTIVE BOX
PULMONARY PROGRESS NOTE      MALENA BECKHAM-[id number]    Patient is a 74y old  Female who presents with a chief complaint of acute respiratory failure (15 May 2020 12:17)      INTERVAL HPI/OVERNIGHT EVENTS:  Seen on PSV>increased RR appeared in some distress  Placed on A/C>increased VT to 420 with improvement >RR<18; sats maintained  MEDICATIONS  (STANDING):  artificial  tears Solution 1 Drop(s) Both EYES three times a day  ascorbic acid 500 milliGRAM(s) Oral daily  calcium carbonate 1250 mG  + Vitamin D (OsCal 500 + D) 1 Tablet(s) Oral daily  cefepime   IVPB      cefepime   IVPB 2000 milliGRAM(s) IV Intermittent every 12 hours  chlorhexidine 0.12% Liquid 15 milliLiter(s) Oral Mucosa every 12 hours  chlorhexidine 4% Liquid 1 Application(s) Topical <User Schedule>  dextrose 5%. 1000 milliLiter(s) (50 mL/Hr) IV Continuous <Continuous>  dextrose 50% Injectable 12.5 Gram(s) IV Push once  dextrose 50% Injectable 25 Gram(s) IV Push once  dextrose 50% Injectable 25 Gram(s) IV Push once  enoxaparin Injectable 40 milliGRAM(s) SubCutaneous daily  fentaNYL   Patch  12 MICROgram(s)/Hr 1 Patch Transdermal every 72 hours  ferrous    sulfate Liquid 300 milliGRAM(s) Enteral Tube daily  insulin glargine Injectable (LANTUS) 10 Unit(s) SubCutaneous every morning  insulin glargine Injectable (LANTUS) 10 Unit(s) SubCutaneous at bedtime  insulin lispro (HumaLOG) corrective regimen sliding scale   SubCutaneous every 6 hours  montelukast 10 milliGRAM(s) Oral daily  pantoprazole   Suspension 40 milliGRAM(s) Oral daily  polyethylene glycol 3350 17 Gram(s) Oral daily  senna 2 Tablet(s) Oral daily      MEDICATIONS  (PRN):  acetaminophen    Suspension .. 650 milliGRAM(s) Oral every 6 hours PRN Temp greater or equal to 38C (100.4F)  dextrose 40% Gel 15 Gram(s) Oral once PRN Blood Glucose LESS THAN 70 milliGRAM(s)/deciliter  glucagon  Injectable 1 milliGRAM(s) IntraMuscular once PRN Glucose LESS THAN 70 milligrams/deciliter  LORazepam   Injectable 0.5 milliGRAM(s) IV Push every 4 hours PRN tachypnea      Allergies    codeine (Hives)    Intolerances        PAST MEDICAL & SURGICAL HISTORY:  Quadriplegia  COVID-19 virus detected  Advanced dementia  DM (diabetes mellitus)  HTN (hypertension)  CVA (cerebral vascular accident)  Respiratory failure  Constipation  GERD (gastroesophageal reflux disease)  Hypertension  Respiratory failure  Diabetes mellitus  Aphasic stroke  Dementia of frontal lobe type  Feeding by G-tube  Tracheostomy tube present  Tracheostomy in place  Gastrostomy in place  Hx of appendectomy      SOCIAL HISTORY  Smoking History:       REVIEW OF SYSTEMS:Unobtainable    CONSTITUTIONAL: Per HPI            Vital Signs Last 24 Hrs  T(C): 36.4 (15 May 2020 07:45), Max: 36.9 (14 May 2020 15:39)  T(F): 97.6 (15 May 2020 07:45), Max: 98.4 (14 May 2020 15:39)  HR: 92 (15 May 2020 09:22) (74 - 103)  BP: 121/60 (15 May 2020 07:45) (114/63 - 134/68)  BP(mean): --  RR: 20 (15 May 2020 07:45) (20 - 24)  SpO2: 99% (15 May 2020 09:22) (94% - 100%)    PHYSICAL EXAMINATION:    GENERAL:Unresponsive>tachypneic initially resolved with change in vent settings    HEENT: Head is normocephalic and atraumatic. Extraocular muscles are intact. Mucous membranes are moist.    NECK: Supple.    LUNGS: Clear to auscultation without wheezing, rales or rhonchi; respirations unlabored    HEART: Regular rate and rhythm without murmur.    ABDOMEN: Soft, nontender, and nondistended.      EXTREMITIES: Without any cyanosis, clubbing, rash, lesions or edema.    NEUROLOGIC: Unresponsive    SKIN: No ulceration or induration present.      LABS:                        11.3   9.47  )-----------( 334      ( 15 May 2020 06:40 )             38.2     05-15    138  |  103  |  20.0  ----------------------------<  124<H>  4.4   |  23.0  |  0.71    Ca    8.8      15 May 2020 06:40  Mg     2.1     05-15                      Procalcitonin, Serum: 0.30 ng/mL (05-13-20 @ 15:39)      MICROBIOLOGY:    RADIOLOGY & ADDITIONAL STUDIES:

## 2020-05-15 NOTE — PROGRESS NOTE ADULT - ATTENDING COMMENTS
Patient seen and examined at bedside. Agree with above A/P.   No fever. Labs and vitals noted.  FiO2 30% on ventilator.  continue antibiotics.  await cultures,   midline once cultures negative.  discharge planning.  discussed with daughter, RN and PA plan of care.

## 2020-05-15 NOTE — PROGRESS NOTE ADULT - PROBLEM SELECTOR PLAN 1
-2/2 COVID 19 infection and bacterial pneumonia  -CTA neg for PE  -Chronic trach  -Continue ventilator support and weaning trials as able   -Continue Cefepime as per ID recs   -Pulm consult appreciated, stable to transfer to a skilled nursing facility/the vent facility for further weaning  -Daughter wants patient's FiO2 to be 30% prior to discharge  -SW following, regarding facility's ability to manage vent settings

## 2020-05-15 NOTE — DISCHARGE NOTE PROVIDER - NSDCCPCAREPLAN_GEN_ALL_CORE_FT
PRINCIPAL DISCHARGE DIAGNOSIS  Diagnosis: Acute respiratory failure with hypoxia  Assessment and Plan of Treatment:       SECONDARY DISCHARGE DIAGNOSES  Diagnosis: UTI (urinary tract infection)  Assessment and Plan of Treatment:     Diagnosis: Pneumonia  Assessment and Plan of Treatment:

## 2020-05-15 NOTE — DISCHARGE NOTE PROVIDER - NSDCMRMEDTOKEN_GEN_ALL_CORE_FT
acetaminophen 160 mg/5 mL oral liquid: 650 milligram(s) orally every 6 hours  Acidophilus oral capsule: 1 cap(s) orally 2 times a day  albuterol 2.5 mg/3 mL (0.083%) inhalation solution: 1 unit(s) inhaled every 6 hours  ascorbic acid 500 mg/5 mL oral liquid: 500 milligram(s) orally once a day  atropine 1% ophthalmic solution: 1 drop(s) buccal 3 times a day under the tongue  Calcium 600+D 600 mg-200 intl units (5 mcg) oral tablet: 1 tab(s) orally 2 times a day  Carafate 1 g/10 mL oral suspension: 10 milliliter(s) by gastrostomy tube 2 times a day  Dextrose 5% and Sodium Chloride 0.45%: 75 mL/hr every shift  doxycycline hyclate 100 mg oral capsule: 1 cap(s) orally 2 times a day for 5 days  enoxaparin 40 mg/0.4 mL injectable solution: 40 milligram(s) injectable once a day  famotidine 20 mg oral tablet: 2 tab(s) orally once a day  fentaNYL 12 mcg/hr transdermal film, extended release: 1 film(s) transdermal every 72 hours  fentaNYL 12 mcg/hr transdermal film, extended release: 1 patch transdermal every 72 hours  Hydrocortisone AC 1% topical cream: Apply topically to face 2 times a day  insulin glargine: 20 unit(s) subcutaneous once a day (at bedtime)  lactulose 10 g/15 mL oral syrup: 30 milliliter(s) orally once a day  Lantus 100 units/mL subcutaneous solution: 25 unit(s) subcutaneous once a day (at bedtime)  loratadine 10 mg oral tablet: 1 tab(s) orally every other day  MiraLax oral powder for reconstitution: 17 gram(s) by gastrostomy tube once a day  montelukast 10 mg oral tablet: 1 tab(s) orally once a day  Mylicon 40 mg/0.6 mL oral liquid: 0.92 milliliter(s) orally 3 times a day  ocular lubricant ophthalmic solution: 1 drop(s) to each affected eye 3 times a day  Protonix: 40 milligram(s) orally 2 times a day  simethicone 40 mg/0.6 mL oral liquid: 0.9 milliliter(s) orally 4 times a day  sodium chloride 0.65% nasal spray: 1  nasal 4 times a day  Tylenol 325 mg oral tablet: 2 tab(s) by gastrostomy tube every 6 hours  Ventolin HFA 90 mcg/inh inhalation aerosol: 2 puff(s) inhaled every 6 hours  zinc sulfate 220 mg oral capsule: 1 cap(s) orally once a day acetaminophen 160 mg/5 mL oral liquid: 650 milligram(s) orally every 6 hours  Acidophilus oral capsule: 1 cap(s) orally 2 times a day  albuterol 2.5 mg/3 mL (0.083%) inhalation solution: 1 unit(s) inhaled every 6 hours  ascorbic acid 500 mg/5 mL oral liquid: 500 milligram(s) orally once a day  atropine 1% ophthalmic solution: 1 drop(s) buccal 3 times a day under the tongue  Calcium 600+D 600 mg-200 intl units (5 mcg) oral tablet: 1 tab(s) orally 2 times a day  Carafate 1 g/10 mL oral suspension: 10 milliliter(s) by gastrostomy tube 2 times a day  chlorhexidine 0.12% mucous membrane liquid: 15 milliliter(s) mucous membrane every 12 hours  Dextrose 5% and Sodium Chloride 0.45%: 75 mL/hr every shift  doxycycline hyclate 100 mg oral capsule: 1 cap(s) orally 2 times a day for 5 days  enoxaparin 40 mg/0.4 mL injectable solution: 40 milligram(s) injectable once a day  famotidine 20 mg oral tablet: 2 tab(s) orally once a day  fentaNYL 12 mcg/hr transdermal film, extended release: 1 film(s) transdermal every 72 hours  fentaNYL 12 mcg/hr transdermal film, extended release: 1 patch transdermal every 72 hours  fentaNYL 12 mcg/hr transdermal film, extended release: 1 patch transdermal every 72 hours  ferrous sulfate 300 mg/5 mL (60 mg/5 mL elemental iron) oral liquid: 5 milliliter(s) orally once a day  Hydrocortisone AC 1% topical cream: Apply topically to face 2 times a day  insulin glargine: 20 unit(s) subcutaneous once a day (at bedtime)  insulin lispro (concentrated) 200 units/mL subcutaneous solution: subcutaneous 3 times a day (before meals) per sliding scale   lactulose 10 g/15 mL oral syrup: 30 milliliter(s) orally once a day  Lantus 100 units/mL subcutaneous solution: 25 unit(s) subcutaneous once a day (at bedtime)  loratadine 10 mg oral tablet: 1 tab(s) orally every other day  MiraLax oral powder for reconstitution: 17 gram(s) by gastrostomy tube once a day  montelukast 10 mg oral tablet: 1 tab(s) orally once a day  Mylicon 40 mg/0.6 mL oral liquid: 0.92 milliliter(s) orally 3 times a day  ocular lubricant ophthalmic solution: 1 drop(s) to each affected eye 3 times a day  Protonix: 40 milligram(s) orally 2 times a day  psyllium 3.4 g/7 g oral powder for reconstitution: 1 scoop(s) by gastrostomy tube once a day  senna oral tablet: 2 tab(s) orally once a day  simethicone 40 mg/0.6 mL oral liquid: 0.9 milliliter(s) orally 4 times a day  sodium chloride 0.65% nasal spray: 1  nasal 4 times a day  Tylenol 325 mg oral tablet: 2 tab(s) by gastrostomy tube every 6 hours  Ventolin HFA 90 mcg/inh inhalation aerosol: 2 puff(s) inhaled every 6 hours  zinc sulfate 220 mg oral capsule: 1 cap(s) orally once a day

## 2020-05-15 NOTE — PROGRESS NOTE ADULT - SUBJECTIVE AND OBJECTIVE BOX
[facility]  INFECTIOUS DISEASES AND INTERNAL MEDICINE at [place]  =======================================================  Sid Mendoza MD  Diplomates American Board of Internal Medicine and Infectious Diseases  =======================================================    [facility]-[id number]  BREA BECKHAM     Follow up:  acute respiratory failure     No more fever and leukocytosis went back to 9k from 15k, seems comfortable. Good O2 sat. on vent trough trach.     PAST MEDICAL & SURGICAL HISTORY:  Quadriplegia  COVID-19 virus detected  Advanced dementia  DM (diabetes mellitus)  HTN (hypertension)  CVA (cerebral vascular accident)  Respiratory failure  Constipation  GERD (gastroesophageal reflux disease)  Hypertension  Respiratory failure  Diabetes mellitus  Aphasic stroke  Dementia of frontal lobe type  Feeding by G-tube  Tracheostomy tube present  Tracheostomy in place  Gastrostomy in place  Hx of appendectomy    Social Hx: no smoking    FAMILY HISTORY:  No pertinent family history in first degree relatives    Allergies  codeine (Hives)    Antibiotics:   cefepime   IVPB 2000 milliGRAM(s) IV Intermittent once     REVIEW OF SYSTEMS:  Nonverbal     Physical Exam:    Vital Signs Last 24 Hrs  Vital Signs Last 24 Hrs  T(C): 36.4 (15 May 2020 07:45), Max: 36.9 (14 May 2020 15:39)  T(F): 97.6 (15 May 2020 07:45), Max: 98.4 (14 May 2020 15:39)  HR: 92 (15 May 2020 09:22) (74 - 103)  BP: 121/60 (15 May 2020 07:45) (114/63 - 134/68)  BP(mean): --  RR: 20 (15 May 2020 07:45) (20 - 24)  SpO2: 99% (15 May 2020 09:22) (94% - 100%)  GEN: NAD  HEENT: normocephalic and atraumatic.   NECK: Supple.  No lymphadenopathy   LUNGS: Clear to auscultation.  HEART: Regular rate and rhythm   ABDOMEN: Soft, nontender, and nondistended.  Positive bowel sounds. +PEG  EXTREMITIES: Without edema.  NEUROLOGIC: Contracted all extremities   PSYCHIATRIC: Awake but not communicative   SKIN: No ulceration or induration present.    Labs:  05-15    138  |  103  |  20.0  ----------------------------<  124<H>  4.4   |  23.0  |  0.71    Ca    8.8      15 May 2020 06:40  Mg     2.1     05-15                  11.3   9.47  )-----------( 334      ( 15 May 2020 06:40 )             38.2     RECENT CULTURES:  05-14 @ 22:40 .Sputum Sputum       Numerous polymorphonuclear leukocytes per low power field  Moderate Squamous epithelial cells per low power field  Numerous Gram Negative Rods seen per oil power field    05-05 @ 17:02 .Sputum Sputum Pseudomonas aeruginosa    Few Pseudomonas aeruginosa  Normal Respiratory Elvira present    Numerous polymorphonuclear leukocytes seen per low power field  Few Squamous epithelial cells seen per low power field  Moderate Gram Positive Rods seen per oil power field  Few Gram Negative Rods seen per oil power field  Few Gram positive cocci in pairs, chains and clusters seen per oil power  field    05-04 @ 21:22 .Urine Clean Catch (Midstream) Proteus mirabilis    >100,000 CFU/ml Proteus mirabilis    05-04 @ 14:01 .Blood Blood-Peripheral     No growth at 5 days.    All imaging and other data have been reviewed.  < from: CT Angio Chest w/ IV Cont (05.08.20 @ 14:13) >  EXAM:  CT ANGIO CHEST (W)AW IC                        PROCEDURE DATE:  05/08/2020    INTERPRETATION:  CLINICAL INFORMATION: Covid positive, shortness of breath, rule out PE  COMPARISON: 07/21/2017.  PROCEDURE:   CT Angiography of the Chest.  51 ml of Omnipaque 350 was injected intravenously. 49 ml were discarded.  Sagittal and coronal reformats were performed as well as 3D (MIP) reconstructions.  FINDINGS:  There is good opacification of the pulmonary arteries, however the study is markedly limited by patient motion. There is no saddle PE.  LUNGS AND AIRWAYS: Tracheostomy tube.  There bilateral diffuse patchy opacities.  PLEURA: Trace bilateral pleural effusions with associated atelectasis.  MEDIASTINUM AND JHONNY: The proximal esophagus is dilated and air-filled, similar to prior.  VESSELS: Within normal limits.  HEART: Heart size is normal. Small bowel pericardial effusion.  CHEST WALL AND LOWER NECK: Within normal limits.  VISUALIZED UPPER ABDOMEN: Gastrostomy tube in place.  BONES: Mild degenerative changes of the spine.  IMPRESSION:  Pattern of GGO suggests infection including atypical pneumonia/viral infection from atypical agents including COVID-19 (C19V-1).  Limited study due to respiratory motion. No saddle PE.  Small bilateral pleural effusions and small pericardial effusion.    Assessment and Plan:   73 y/o woman with with hx of frontal lobe dementia, stroke, functional quadriplegia, tracheostomy normally on trach collar, dysphagia s/p PEG, nursing home patient was admitted on 5/4 with with fever and worsening hypoxemia. She tested positive for COVID19 in the nursing home. Sputum culture showed pseudomonas and urine proteus so ID was called for recommendations. Patient is awake but not communicative. Due to worsening of respiration and higher FIO2 requirement and also possible seizure, CTA and head CT were done.    COVID 19 + infection  Viral pneumonia  Acute hypoxic respiratory failure  Bacterial pneumonia  UTI  Stroke  Chronic trach and PEG    - Airborne and contact isolation   - COVID 19 PCR positive   - CXR with multifocal opacities   - Procalcitonin 30.47-->0.3 improved significantly   - CRP=36.32-->9.59  - Blood culture negative  - Leukocytosis 15k-->9k  - Sputum culture with Pseudomonas pansensitive , will repeat it.   - UA negative but urine culture with proteus that could be contamination   - Continue Cefepime 2gm q12 to cover sputum and urine both   - Last day wound be 5/21.   She will get low grade fever and mild leukocytosis now and then, unless it is persistent or higher, no need for work up.     Will follow.

## 2020-05-15 NOTE — PROGRESS NOTE ADULT - ASSESSMENT
Minimal tolerance for PSV SBT.  Doing better with increase in VT and A/C setting currently.  However still need to continue to attempt SBT.    Plan:  1.Continue A/C for now  2.SBT as tolerated  3.No pulmonary contraindication to transfer to vent facility for further weaning.

## 2020-05-15 NOTE — DISCHARGE NOTE PROVIDER - HOSPITAL COURSE
74 yr old female with frontal lobe dementia, stroke, diabetes mellitus, hypertension, functional quadriplegia, chronic respiratory failure on trach collar, s/p PEG was sent in for evaluation  of hypoxia and fever. She is COVID 19 positive and was admitted to ICU, trach changed to cuff and was placed on full mechanical ventilation. She was on pressors, subsequently discontinued and was transferred to medical floor on 5/6/20. Noted to have UTI and sputum cultures growing Pseudomonas. RRT was called on 5/7 for hypoxia, CTA chest was done, negative for pulmonary embolism. CT head was done for concern for altered mental status, negative for stroke. Neurology consulted for evaluation of possible seizure like activity, advised likely secondary to hypoxia and palliative care evaluation was recommended. Cefepime was started for pneumonia. Goals of care discussed with spouse, patient is full code. Pulmonary was consulted for ventilator management. Her FiO2 requirements decreased on the ventilator. Cefepime to be completed on 5/21. She had a fever on 5/14, cultures were sent. 74 yr old female with frontal lobe dementia, stroke, diabetes mellitus, hypertension, functional quadriplegia, chronic respiratory failure on trach collar, s/p PEG was sent in for evaluation  of hypoxia and fever. She is COVID 19 positive and was admitted to ICU, trach changed to cuff and was placed on full mechanical ventilation. She was on pressors, subsequently discontinued and was transferred to medical floor on 5/6/20. Noted to have UTI and sputum cultures growing Pseudomonas. RRT was called on 5/7 for hypoxia, CTA chest was done, negative for pulmonary embolism. CT head was done for concern for altered mental status, negative for stroke. Neurology consulted for evaluation of possible seizure like activity, advised likely secondary to hypoxia and palliative care evaluation was recommended. Cefepime was started for pneumonia. Goals of care discussed with spouse, patient is full code. Pulmonary was consulted for ventilator management. Her FiO2 requirements decreased on the ventilator. Cefepime to be completed on 5/21. She had a fever on 5/14, cultures were sent. Afebrile cx negative to date

## 2020-05-15 NOTE — CHART NOTE - NSCHARTNOTEFT_GEN_A_CORE
Source: Patient [ ]  Family [ ]   other [x ]    Current Diet: Diet, NPO with Tube Feed:   Tube Feeding Modality: Gastrostomy  Glucerna 1.5 Horacio  Total Volume for 24 Hours (mL): 720  Continuous  Starting Tube Feed Rate {mL per Hour}: 10  Increase Tube Feed Rate by (mL): 10     Every 2 hours  Until Goal Tube Feed Rate (mL per Hour): 30  Tube Feed Duration (in Hours): 24  Tube Feed Start Time: 19:00 (05-05-20 @ 04:47)    Enteral Nutrition: Glucerna 1.5 at goal rate of 30ml/hr (x20 hrs) provides 600ml, 900 kcal, 50g pro, 456 ml free water.    Current Weight:   (5/4) 129#    % Weight Change no new weight to assess, will continue to monitor.  No edema noted per documentation    Pertinent Medications: MEDICATIONS  (STANDING):  artificial  tears Solution 1 Drop(s) Both EYES three times a day  ascorbic acid 500 milliGRAM(s) Oral daily  calcium carbonate 1250 mG  + Vitamin D (OsCal 500 + D) 1 Tablet(s) Oral daily  cefepime   IVPB      cefepime   IVPB 2000 milliGRAM(s) IV Intermittent every 12 hours  chlorhexidine 0.12% Liquid 15 milliLiter(s) Oral Mucosa every 12 hours  chlorhexidine 4% Liquid 1 Application(s) Topical <User Schedule>  dextrose 5%. 1000 milliLiter(s) (50 mL/Hr) IV Continuous <Continuous>  dextrose 50% Injectable 12.5 Gram(s) IV Push once  dextrose 50% Injectable 25 Gram(s) IV Push once  dextrose 50% Injectable 25 Gram(s) IV Push once  enoxaparin Injectable 40 milliGRAM(s) SubCutaneous daily  fentaNYL   Patch  12 MICROgram(s)/Hr 1 Patch Transdermal every 72 hours  ferrous    sulfate Liquid 300 milliGRAM(s) Enteral Tube daily  insulin glargine Injectable (LANTUS) 10 Unit(s) SubCutaneous every morning  insulin glargine Injectable (LANTUS) 10 Unit(s) SubCutaneous at bedtime  insulin lispro (HumaLOG) corrective regimen sliding scale   SubCutaneous every 6 hours  montelukast 10 milliGRAM(s) Oral daily  pantoprazole   Suspension 40 milliGRAM(s) Oral daily  polyethylene glycol 3350 17 Gram(s) Oral daily  senna 2 Tablet(s) Oral daily    MEDICATIONS  (PRN):  acetaminophen    Suspension .. 650 milliGRAM(s) Oral every 6 hours PRN Temp greater or equal to 38C (100.4F)  dextrose 40% Gel 15 Gram(s) Oral once PRN Blood Glucose LESS THAN 70 milliGRAM(s)/deciliter  glucagon  Injectable 1 milliGRAM(s) IntraMuscular once PRN Glucose LESS THAN 70 milligrams/deciliter    Pertinent Labs: CBC Full  -  ( 15 May 2020 06:40 )  WBC Count : 9.47 K/uL  RBC Count : 4.14 M/uL  Hemoglobin : 11.3 g/dL  Hematocrit : 38.2 %  Platelet Count - Automated : 334 K/uL  Mean Cell Volume : 92.3 fl  Mean Cell Hemoglobin : 27.3 pg  Mean Cell Hemoglobin Concentration : 29.6 gm/dL  05-15 Na138 mmol/L Glu 124 mg/dL<H> K+ 4.4 mmol/L Cr  0.71 mg/dL BUN 20.0 mg/dL Phos n/a   Alb n/a   PAB n/a       Skin: no skin breakdown noted    Current Nutrition Diagnosis: Pt remains at high nutrition risk secondary to inadequate energy intake related to low TF rate in the setting of increased needs with hypoxic respiratory failure due to COVID19 as evidenced by pt unable to meet estimated protein/energy needs x 10 days.  Pt remains vent dependent, +trach, s/p PEG.  Pt tolerating Glucerna @ 30ml/hr.     Recommendations:   1. Consider increasing Glucerna 1.5 by 10 ml q 6 hrs to goal of 50 ml/hr (x20 hrs) daily to provide 1000ml, 1500 kcal, 83g pro and 760ml free water. Additional free water per MD discretion.  2) RX: Liquid MVI and Vitamin C (500mg) daily  3) Monitor tolerance to TF  4) Monitor daily wts     Monitoring and Evaluation:   [ ] PO intake [x ] Tolerance to diet prescription [X] Weights  [X] Follow up per protocol [X] Labs:

## 2020-05-15 NOTE — PROGRESS NOTE ADULT - SUBJECTIVE AND OBJECTIVE BOX
BREA BECKHAM Patient is a 74y old  Female who presents with a chief complaint of acute respiratory failure (14 May 2020 16:07)    The patient was seen and evaluated at bedside  no overnight events  pt did not awake for exam  The patient is in no acute distress.  vital signs are stable    Mode: AC/ CMV (Assist Control/ Continuous Mandatory Ventilation), RR (machine): 12, TV (machine): 380, FiO2: 30, PEEP: 5, ITime: 0.8, MAP: 7, PIP: 20I&O's Summary    14 May 2020 07:01  -  15 May 2020 07:00  --------------------------------------------------------  IN: 390 mL / OUT: 0 mL / NET: 390 mL    Vital Signs Last 24 Hrs  T(C): 36.4 (15 May 2020 07:45), Max: 36.9 (14 May 2020 15:39)  T(F): 97.6 (15 May 2020 07:45), Max: 98.4 (14 May 2020 15:39)  HR: 92 (15 May 2020 09:22) (74 - 103)  BP: 121/60 (15 May 2020 07:45) (114/63 - 134/68)  BP(mean): --  RR: 20 (15 May 2020 07:45) (20 - 24)  SpO2: 99% (15 May 2020 09:22) (94% - 100%)T(C): 36.4 (05-15-20 @ 07:45), Max: 36.9 (05-14-20 @ 15:39)  HR: 92 (05-15-20 @ 09:22) (74 - 103)  BP: 121/60 (05-15-20 @ 07:45) (114/63 - 134/68)  RR: 20 (05-15-20 @ 07:45) (20 - 24)  SpO2: 99% (05-15-20 @ 09:22) (94% - 100%)  Wt(kg): --    PHYSICAL EXAM:    GENERAL: NAD. resting comfortably  LUNG: good air entry b/l.  Trach in place, on vent.   HEART: Regular rate and rhythm; No murmurs.   ABDOMEN: Soft, Nontender, Nondistended.  EXTREMITIES:  Peripheral Pulses, No cyanosis, or edema      LABS:                          11.3   9.47  )-----------( 334      ( 15 May 2020 06:40 )             38.2     05-15    138  |  103  |  20.0  ----------------------------<  124<H>  4.4   |  23.0  |  0.71    Ca    8.8      15 May 2020 06:40  Mg     2.1     05-15      CAPILLARY BLOOD GLUCOSE      POCT Blood Glucose.: 108 mg/dL (15 May 2020 05:16)  POCT Blood Glucose.: 95 mg/dL (14 May 2020 23:16)  POCT Blood Glucose.: 100 mg/dL (14 May 2020 16:35)  POCT Blood Glucose.: 99 mg/dL (14 May 2020 11:37)

## 2020-05-16 DIAGNOSIS — U07.1 COVID-19: ICD-10-CM

## 2020-05-16 DIAGNOSIS — I10 ESSENTIAL (PRIMARY) HYPERTENSION: ICD-10-CM

## 2020-05-16 LAB
CULTURE RESULTS: SIGNIFICANT CHANGE UP
GLUCOSE BLDC GLUCOMTR-MCNC: 127 MG/DL — HIGH (ref 70–99)
GLUCOSE BLDC GLUCOMTR-MCNC: 130 MG/DL — HIGH (ref 70–99)
GLUCOSE BLDC GLUCOMTR-MCNC: 165 MG/DL — HIGH (ref 70–99)
GLUCOSE BLDC GLUCOMTR-MCNC: 178 MG/DL — HIGH (ref 70–99)
SPECIMEN SOURCE: SIGNIFICANT CHANGE UP

## 2020-05-16 PROCEDURE — 99233 SBSQ HOSP IP/OBS HIGH 50: CPT

## 2020-05-16 PROCEDURE — 99232 SBSQ HOSP IP/OBS MODERATE 35: CPT | Mod: CS

## 2020-05-16 RX ORDER — PSYLLIUM SEED (WITH DEXTROSE)
1 POWDER (GRAM) ORAL THREE TIMES A DAY
Refills: 0 | Status: DISCONTINUED | OUTPATIENT
Start: 2020-05-16 | End: 2020-05-18

## 2020-05-16 RX ADMIN — FENTANYL CITRATE 1 PATCH: 50 INJECTION INTRAVENOUS at 17:49

## 2020-05-16 RX ADMIN — PANTOPRAZOLE SODIUM 40 MILLIGRAM(S): 20 TABLET, DELAYED RELEASE ORAL at 11:44

## 2020-05-16 RX ADMIN — Medication 1 TABLET(S): at 11:45

## 2020-05-16 RX ADMIN — CHLORHEXIDINE GLUCONATE 15 MILLILITER(S): 213 SOLUTION TOPICAL at 04:33

## 2020-05-16 RX ADMIN — Medication 1 PACKET(S): at 21:22

## 2020-05-16 RX ADMIN — CEFEPIME 100 MILLIGRAM(S): 1 INJECTION, POWDER, FOR SOLUTION INTRAMUSCULAR; INTRAVENOUS at 17:37

## 2020-05-16 RX ADMIN — POLYETHYLENE GLYCOL 3350 17 GRAM(S): 17 POWDER, FOR SOLUTION ORAL at 11:45

## 2020-05-16 RX ADMIN — FENTANYL CITRATE 1 PATCH: 50 INJECTION INTRAVENOUS at 08:11

## 2020-05-16 RX ADMIN — CEFEPIME 100 MILLIGRAM(S): 1 INJECTION, POWDER, FOR SOLUTION INTRAMUSCULAR; INTRAVENOUS at 04:33

## 2020-05-16 RX ADMIN — Medication 300 MILLIGRAM(S): at 11:45

## 2020-05-16 RX ADMIN — Medication 1 DROP(S): at 22:12

## 2020-05-16 RX ADMIN — CHLORHEXIDINE GLUCONATE 15 MILLILITER(S): 213 SOLUTION TOPICAL at 17:37

## 2020-05-16 RX ADMIN — INSULIN GLARGINE 10 UNIT(S): 100 INJECTION, SOLUTION SUBCUTANEOUS at 11:50

## 2020-05-16 RX ADMIN — Medication 1 DROP(S): at 04:34

## 2020-05-16 RX ADMIN — INSULIN GLARGINE 10 UNIT(S): 100 INJECTION, SOLUTION SUBCUTANEOUS at 21:23

## 2020-05-16 RX ADMIN — CHLORHEXIDINE GLUCONATE 1 APPLICATION(S): 213 SOLUTION TOPICAL at 08:34

## 2020-05-16 RX ADMIN — Medication 500 MILLIGRAM(S): at 11:45

## 2020-05-16 RX ADMIN — Medication 2: at 11:52

## 2020-05-16 RX ADMIN — ENOXAPARIN SODIUM 40 MILLIGRAM(S): 100 INJECTION SUBCUTANEOUS at 11:45

## 2020-05-16 RX ADMIN — SENNA PLUS 2 TABLET(S): 8.6 TABLET ORAL at 11:44

## 2020-05-16 RX ADMIN — MONTELUKAST 10 MILLIGRAM(S): 4 TABLET, CHEWABLE ORAL at 11:45

## 2020-05-16 RX ADMIN — Medication 1 DROP(S): at 11:34

## 2020-05-16 NOTE — PROGRESS NOTE ADULT - SUBJECTIVE AND OBJECTIVE BOX
PULMONARY PROGRESS NOTE      MALENA BECKHAM-[id number]    Patient is a 74y old  Female who presents with a chief complaint of acute respiratory failure (15 May 2020 14:37)      INTERVAL HPI/OVERNIGHT EVENTS:    MEDICATIONS  (STANDING):  artificial  tears Solution 1 Drop(s) Both EYES three times a day  ascorbic acid 500 milliGRAM(s) Oral daily  calcium carbonate 1250 mG  + Vitamin D (OsCal 500 + D) 1 Tablet(s) Oral daily  cefepime   IVPB      cefepime   IVPB 2000 milliGRAM(s) IV Intermittent every 12 hours  chlorhexidine 0.12% Liquid 15 milliLiter(s) Oral Mucosa every 12 hours  chlorhexidine 4% Liquid 1 Application(s) Topical <User Schedule>  dextrose 5%. 1000 milliLiter(s) (50 mL/Hr) IV Continuous <Continuous>  dextrose 50% Injectable 12.5 Gram(s) IV Push once  dextrose 50% Injectable 25 Gram(s) IV Push once  dextrose 50% Injectable 25 Gram(s) IV Push once  enoxaparin Injectable 40 milliGRAM(s) SubCutaneous daily  fentaNYL   Patch  12 MICROgram(s)/Hr 1 Patch Transdermal every 72 hours  ferrous    sulfate Liquid 300 milliGRAM(s) Enteral Tube daily  insulin glargine Injectable (LANTUS) 10 Unit(s) SubCutaneous every morning  insulin glargine Injectable (LANTUS) 10 Unit(s) SubCutaneous at bedtime  insulin lispro (HumaLOG) corrective regimen sliding scale   SubCutaneous every 6 hours  montelukast 10 milliGRAM(s) Oral daily  pantoprazole   Suspension 40 milliGRAM(s) Oral daily  polyethylene glycol 3350 17 Gram(s) Oral daily  senna 2 Tablet(s) Oral daily      MEDICATIONS  (PRN):  acetaminophen    Suspension .. 650 milliGRAM(s) Oral every 6 hours PRN Temp greater or equal to 38C (100.4F)  dextrose 40% Gel 15 Gram(s) Oral once PRN Blood Glucose LESS THAN 70 milliGRAM(s)/deciliter  glucagon  Injectable 1 milliGRAM(s) IntraMuscular once PRN Glucose LESS THAN 70 milligrams/deciliter  LORazepam   Injectable 0.5 milliGRAM(s) IV Push every 4 hours PRN tachypnea      Allergies    codeine (Hives)    Intolerances        PAST MEDICAL & SURGICAL HISTORY:  Quadriplegia  COVID-19 virus detected  Advanced dementia  DM (diabetes mellitus)  HTN (hypertension)  CVA (cerebral vascular accident)  Respiratory failure  Constipation  GERD (gastroesophageal reflux disease)  Hypertension  Respiratory failure  Diabetes mellitus  Aphasic stroke  Dementia of frontal lobe type  Feeding by G-tube  Tracheostomy tube present  Tracheostomy in place  Gastrostomy in place  Hx of appendectomy      SOCIAL HISTORY  Smoking History:       REVIEW OF SYSTEMS:    CONSTITUTIONAL:  No distress    HEENT:  Eyes:  No diplopia or blurred vision. ENT:  No earache, sore throat or runny nose.    CARDIOVASCULAR:  No pressure, squeezing, tightness, heaviness or aching about the chest; no palpitations.    RESPIRATORY:  No cough, shortness of breath, PND or orthopnea. Mild SOBOE    GASTROINTESTINAL:  No nausea, vomiting or diarrhea.    GENITOURINARY:  No dysuria, frequency or urgency.    MUSCULOSKELETAL:  No joint pain    SKIN:  No new lesions.    NEUROLOGIC:  No paresthesias, fasciculations, seizures or weakness.    PSYCHIATRIC:  No disorder of thought or mood.    ENDOCRINE:  No heat or cold intolerance, polyuria or polydipsia.    HEMATOLOGICAL:  No easy bruising or bleeding.     Vital Signs Last 24 Hrs  T(C): 36.4 (16 May 2020 08:00), Max: 37 (15 May 2020 16:06)  T(F): 97.5 (16 May 2020 08:00), Max: 98.6 (15 May 2020 16:06)  HR: 77 (16 May 2020 09:56) (71 - 97)  BP: 113/62 (16 May 2020 08:00) (113/62 - 119/71)  BP(mean): --  RR: 18 (15 May 2020 23:40) (18 - 20)  SpO2: 94% (16 May 2020 09:56) (94% - 100%)    PHYSICAL EXAMINATION:    GENERAL: The patient is awake and alert in no apparent distress.     HEENT: Head is normocephalic and atraumatic. Extraocular muscles are intact. Mucous membranes are moist.    NECK: Supple.    LUNGS: Clear to auscultation without wheezing, rales or rhonchi; respirations unlabored    HEART: Regular rate and rhythm without murmur.    ABDOMEN: Soft, nontender, and nondistended.      EXTREMITIES: Without any cyanosis, clubbing, rash, lesions or edema.    NEUROLOGIC: Grossly intact.    SKIN: No ulceration or induration present.      LABS:                        11.3   9.47  )-----------( 334      ( 15 May 2020 06:40 )             38.2     05-15    138  |  103  |  20.0  ----------------------------<  124<H>  4.4   |  23.0  |  0.71    Ca    8.8      15 May 2020 06:40  Mg     2.1     05-15                      Procalcitonin, Serum: 0.30 ng/mL (05-13-20 @ 15:39)      MICROBIOLOGY:    RADIOLOGY & ADDITIONAL STUDIES:< from: CT Angio Chest w/ IV Cont (05.08.20 @ 14:13) >  FINDINGS:    There is good opacification of the pulmonary arteries, however the study is markedly limited by patient motion. There is no saddle PE.    LUNGS AND AIRWAYS: Tracheostomy tube.  There bilateral diffuse patchy opacities.    PLEURA: Trace bilateral pleural effusions with associated atelectasis.    MEDIASTINUM AND JHONNY: The proximal esophagus is dilated and air-filled, similar to prior.    VESSELS: Within normal limits.    HEART: Heart size is normal. Small bowel pericardial effusion.    CHEST WALL AND LOWER NECK: Within normal limits.    VISUALIZED UPPER ABDOMEN: Gastrostomy tube in place.    BONES: Mild degenerative changes of the spine.    IMPRESSION:    Pattern of GGO suggests infection including atypical pneumonia/viral infection from atypical agents including COVID-19 (C19V-1).    Limited study due to respiratory motion. No saddle PE.    Small bilateral pleural effusions and small pericardial effusion.                SANJAY IVERSON M.D., ATTENDING RADIOLOGIST  This document has been electronically signed. May  8 2020  2:45PM                < end of copied text >

## 2020-05-16 NOTE — PROGRESS NOTE ADULT - ASSESSMENT
74 yr old female with frontal lobe dementia, stroke, diabetes mellitus, hypertension, functional quadriplegia, chronic respiratory failure on trach collar, s/p PEG was sent in for evaluation  of hypoxia and fever.   Febrile Tm 101.1, Hypoxic Spo2 73% on TC. WBC 11.07, CRP 36, PCT 30, Ferritin 889. COVID-19 (+) at Nursing Home.    She was admitted to ICU, trach changed to cuff and was placed on full mechanical ventilation. She was on pressors, subsequently discontinued and was transferred to medical floor on 5/6/20. Noted to have UTI and sputum cultures growing Pseudomonas. RRT was called on 5/7 for hypoxia, CTA chest was done, negative for pulmonary embolism. CT head was done for concern for altered mental status, negative for stroke. Neurology consulted for evaluation of possible seizure like activity, advised likely secondary to hypoxia and palliative care evaluation was recommended. Cefepime was started for pneumonia. Goals of care discussed with spouse, patient is full code. Pulmonary was consulted for ventilator management. Her FiO2 requirements decreased on the ventilator.    Repeat sputum cultures with normal respiratory alejandro. Remain afebrile with normal WBC. To continue Cefepime through 5/21/20 as per ID recommendations. Pending Midline. Pulmonology recommending SBT.

## 2020-05-16 NOTE — PROGRESS NOTE ADULT - SUBJECTIVE AND OBJECTIVE BOX
HOSPITALIST PROGRESS NOTE    BREA BECKHAM  [id number]  74yFemale    Patient is a 74y old  Female who presents with a chief complaint of acute respiratory failure (16 May 2020 13:16)      SUBJECTIVE:   Chart reviewed since last visit.  Patient seen and examined at bedside for VDRF and COVID-19 pneumonia.  Nonverbal, no meaningful response.      OBJECTIVE:  Vital Signs Last 24 Hrs  T(C): 36.4 (16 May 2020 08:00), Max: 37 (15 May 2020 16:06)  T(F): 97.5 (16 May 2020 08:00), Max: 98.6 (15 May 2020 16:06)  HR: 81 (16 May 2020 13:55) (71 - 97)  BP: 113/62 (16 May 2020 08:00) (113/62 - 119/71)   RR: 18 (15 May 2020 23:40) (18 - 20)  SpO2: 95% (16 May 2020 13:55) (94% - 100%)    PHYSICAL EXAMINATION  General: :laura in bed, NAD  HEENT:  doesnt follow across midline - stares  NECK:  Trach+  CVS: regular rate and rhythm S1 S2  RESP:  Fair air entry bilaterally  GI:  G-tube+ Soft nondistended NT  : No suprapubic tenderness  MSK:  contracted extremities  CNS:  Obtunded - contracted extremities  INTEG:  Unable to visualize sacum  PSYCH:  Unable to obtain    MONITOR:  CAPILLARY BLOOD GLUCOSE      POCT Blood Glucose.: 165 mg/dL (16 May 2020 11:33)  POCT Blood Glucose.: 130 mg/dL (16 May 2020 04:56)  POCT Blood Glucose.: 101 mg/dL (15 May 2020 23:04)  POCT Blood Glucose.: 92 mg/dL (15 May 2020 21:24)  POCT Blood Glucose.: 128 mg/dL (15 May 2020 16:37)        I&O's Summary    15 May 2020 07:01  -  16 May 2020 07:00  --------------------------------------------------------  IN: 400 mL / OUT: 0 mL / NET: 400 mL                            11.3   9.47  )-----------( 334      ( 15 May 2020 06:40 )             38.2       05-15    138  |  103  |  20.0  ----------------------------<  124<H>  4.4   |  23.0  |  0.71    Ca    8.8      15 May 2020 06:40  Mg     2.1     05-15              Culture:  COVID-19 PCR: NotDetec: (05.12.20 @ 13:11)    Culture - Sputum . (05.14.20 @ 22:40)    Gram Stain:   Numerous polymorphonuclear leukocytes per low power field  Moderate Squamous epithelial cells per low power field  Numerous Gram Negative Rods seen per oil power field    Specimen Source: .Sputum Sputum    Culture Results:   Normal Respiratory Elvira present        TTE:    RADIOLOGY  < from: CT Angio Chest w/ IV Cont (05.08.20 @ 14:13) >     EXAM:  CT ANGIO CHEST (W)AW IC                          PROCEDURE DATE:  05/08/2020          INTERPRETATION:  CLINICAL INFORMATION: Covid positive, shortness of breath, rule out PE    COMPARISON: 07/21/2017.    PROCEDURE:   CT Angiography of the Chest.  51 ml of Omnipaque 350 was injected intravenously. 49 ml were discarded.  Sagittal and coronal reformats were performed as well as 3D (MIP) reconstructions.      FINDINGS:    There is good opacification of the pulmonary arteries, however the study is markedly limited by patient motion. There is no saddle PE.    LUNGS AND AIRWAYS: Tracheostomy tube.  There bilateral diffuse patchy opacities.    PLEURA: Trace bilateral pleural effusions with associated atelectasis.    MEDIASTINUM AND JHONNY: The proximal esophagus is dilated and air-filled, similar to prior.    VESSELS: Within normal limits.    HEART: Heart size is normal. Small bowel pericardial effusion.    CHEST WALL AND LOWER NECK: Within normal limits.    VISUALIZED UPPER ABDOMEN: Gastrostomy tube in place.    BONES: Mild degenerative changes of the spine.    IMPRESSION:    Pattern of GGO suggests infection including atypical pneumonia/viral infection from atypical agents including COVID-19 (C19V-1).    Limited study due to respiratory motion. No saddle PE.    Small bilateral pleural effusions and small pericardial effusion.         SANJAY IVERSON M.D., ATTENDING RADIOLOGIST  This document has been electronically signed. May  8 2020  2:45PM          < end of copied text >        MEDICATIONS  (STANDING):  artificial  tears Solution 1 Drop(s) Both EYES three times a day  ascorbic acid 500 milliGRAM(s) Oral daily  calcium carbonate 1250 mG  + Vitamin D (OsCal 500 + D) 1 Tablet(s) Oral daily  cefepime   IVPB      cefepime   IVPB 2000 milliGRAM(s) IV Intermittent every 12 hours  chlorhexidine 0.12% Liquid 15 milliLiter(s) Oral Mucosa every 12 hours  chlorhexidine 4% Liquid 1 Application(s) Topical <User Schedule>  dextrose 5%. 1000 milliLiter(s) (50 mL/Hr) IV Continuous <Continuous>  dextrose 50% Injectable 12.5 Gram(s) IV Push once  dextrose 50% Injectable 25 Gram(s) IV Push once  dextrose 50% Injectable 25 Gram(s) IV Push once  enoxaparin Injectable 40 milliGRAM(s) SubCutaneous daily  fentaNYL   Patch  12 MICROgram(s)/Hr 1 Patch Transdermal every 72 hours  ferrous    sulfate Liquid 300 milliGRAM(s) Enteral Tube daily  insulin glargine Injectable (LANTUS) 10 Unit(s) SubCutaneous every morning  insulin glargine Injectable (LANTUS) 10 Unit(s) SubCutaneous at bedtime  insulin lispro (HumaLOG) corrective regimen sliding scale   SubCutaneous every 6 hours  montelukast 10 milliGRAM(s) Oral daily  pantoprazole   Suspension 40 milliGRAM(s) Oral daily  polyethylene glycol 3350 17 Gram(s) Oral daily  senna 2 Tablet(s) Oral daily      MEDICATIONS  (PRN):  acetaminophen    Suspension .. 650 milliGRAM(s) Oral every 6 hours PRN Temp greater or equal to 38C (100.4F)  dextrose 40% Gel 15 Gram(s) Oral once PRN Blood Glucose LESS THAN 70 milliGRAM(s)/deciliter  glucagon  Injectable 1 milliGRAM(s) IntraMuscular once PRN Glucose LESS THAN 70 milligrams/deciliter  LORazepam   Injectable 0.5 milliGRAM(s) IV Push every 4 hours PRN tachypnea

## 2020-05-17 DIAGNOSIS — R53.2 FUNCTIONAL QUADRIPLEGIA: ICD-10-CM

## 2020-05-17 LAB
ANION GAP SERPL CALC-SCNC: 9 MMOL/L — SIGNIFICANT CHANGE UP (ref 5–17)
BUN SERPL-MCNC: 12 MG/DL — SIGNIFICANT CHANGE UP (ref 8–20)
CALCIUM SERPL-MCNC: 9 MG/DL — SIGNIFICANT CHANGE UP (ref 8.6–10.2)
CHLORIDE SERPL-SCNC: 102 MMOL/L — SIGNIFICANT CHANGE UP (ref 98–107)
CO2 SERPL-SCNC: 26 MMOL/L — SIGNIFICANT CHANGE UP (ref 22–29)
CREAT SERPL-MCNC: 0.57 MG/DL — SIGNIFICANT CHANGE UP (ref 0.5–1.3)
GLUCOSE BLDC GLUCOMTR-MCNC: 133 MG/DL — HIGH (ref 70–99)
GLUCOSE BLDC GLUCOMTR-MCNC: 134 MG/DL — HIGH (ref 70–99)
GLUCOSE BLDC GLUCOMTR-MCNC: 136 MG/DL — HIGH (ref 70–99)
GLUCOSE BLDC GLUCOMTR-MCNC: 207 MG/DL — HIGH (ref 70–99)
GLUCOSE SERPL-MCNC: 167 MG/DL — HIGH (ref 70–99)
HCT VFR BLD CALC: 32.3 % — LOW (ref 34.5–45)
HGB BLD-MCNC: 10 G/DL — LOW (ref 11.5–15.5)
MCHC RBC-ENTMCNC: 27.8 PG — SIGNIFICANT CHANGE UP (ref 27–34)
MCHC RBC-ENTMCNC: 31 GM/DL — LOW (ref 32–36)
MCV RBC AUTO: 89.7 FL — SIGNIFICANT CHANGE UP (ref 80–100)
PLATELET # BLD AUTO: 305 K/UL — SIGNIFICANT CHANGE UP (ref 150–400)
POTASSIUM SERPL-MCNC: 4.5 MMOL/L — SIGNIFICANT CHANGE UP (ref 3.5–5.3)
POTASSIUM SERPL-SCNC: 4.5 MMOL/L — SIGNIFICANT CHANGE UP (ref 3.5–5.3)
RBC # BLD: 3.6 M/UL — LOW (ref 3.8–5.2)
RBC # FLD: 15.4 % — HIGH (ref 10.3–14.5)
SODIUM SERPL-SCNC: 137 MMOL/L — SIGNIFICANT CHANGE UP (ref 135–145)
WBC # BLD: 6.39 K/UL — SIGNIFICANT CHANGE UP (ref 3.8–10.5)
WBC # FLD AUTO: 6.39 K/UL — SIGNIFICANT CHANGE UP (ref 3.8–10.5)

## 2020-05-17 PROCEDURE — 99232 SBSQ HOSP IP/OBS MODERATE 35: CPT | Mod: CS

## 2020-05-17 PROCEDURE — 99232 SBSQ HOSP IP/OBS MODERATE 35: CPT

## 2020-05-17 RX ORDER — LACTOBACILLUS ACIDOPHILUS 100MM CELL
1 CAPSULE ORAL
Refills: 0 | Status: DISCONTINUED | OUTPATIENT
Start: 2020-05-17 | End: 2020-05-18

## 2020-05-17 RX ADMIN — POLYETHYLENE GLYCOL 3350 17 GRAM(S): 17 POWDER, FOR SOLUTION ORAL at 13:37

## 2020-05-17 RX ADMIN — Medication 650 MILLIGRAM(S): at 16:58

## 2020-05-17 RX ADMIN — PANTOPRAZOLE SODIUM 40 MILLIGRAM(S): 20 TABLET, DELAYED RELEASE ORAL at 13:37

## 2020-05-17 RX ADMIN — ENOXAPARIN SODIUM 40 MILLIGRAM(S): 100 INJECTION SUBCUTANEOUS at 13:38

## 2020-05-17 RX ADMIN — INSULIN GLARGINE 10 UNIT(S): 100 INJECTION, SOLUTION SUBCUTANEOUS at 13:45

## 2020-05-17 RX ADMIN — CHLORHEXIDINE GLUCONATE 15 MILLILITER(S): 213 SOLUTION TOPICAL at 16:56

## 2020-05-17 RX ADMIN — Medication 1 TABLET(S): at 16:58

## 2020-05-17 RX ADMIN — Medication 1 PACKET(S): at 04:11

## 2020-05-17 RX ADMIN — FENTANYL CITRATE 1 PATCH: 50 INJECTION INTRAVENOUS at 19:28

## 2020-05-17 RX ADMIN — CEFEPIME 100 MILLIGRAM(S): 1 INJECTION, POWDER, FOR SOLUTION INTRAMUSCULAR; INTRAVENOUS at 04:10

## 2020-05-17 RX ADMIN — Medication 1 DROP(S): at 04:12

## 2020-05-17 RX ADMIN — Medication 0.5 MILLIGRAM(S): at 21:16

## 2020-05-17 RX ADMIN — CHLORHEXIDINE GLUCONATE 1 APPLICATION(S): 213 SOLUTION TOPICAL at 04:11

## 2020-05-17 RX ADMIN — Medication 1 DROP(S): at 23:40

## 2020-05-17 RX ADMIN — CHLORHEXIDINE GLUCONATE 15 MILLILITER(S): 213 SOLUTION TOPICAL at 04:10

## 2020-05-17 RX ADMIN — SENNA PLUS 2 TABLET(S): 8.6 TABLET ORAL at 13:39

## 2020-05-17 RX ADMIN — MONTELUKAST 10 MILLIGRAM(S): 4 TABLET, CHEWABLE ORAL at 13:38

## 2020-05-17 RX ADMIN — INSULIN GLARGINE 10 UNIT(S): 100 INJECTION, SOLUTION SUBCUTANEOUS at 23:39

## 2020-05-17 RX ADMIN — Medication 500 MILLIGRAM(S): at 13:38

## 2020-05-17 RX ADMIN — Medication 1 DROP(S): at 13:39

## 2020-05-17 RX ADMIN — CEFEPIME 100 MILLIGRAM(S): 1 INJECTION, POWDER, FOR SOLUTION INTRAMUSCULAR; INTRAVENOUS at 16:58

## 2020-05-17 RX ADMIN — Medication 1 PACKET(S): at 23:39

## 2020-05-17 RX ADMIN — Medication 300 MILLIGRAM(S): at 13:39

## 2020-05-17 RX ADMIN — Medication 2: at 00:20

## 2020-05-17 RX ADMIN — Medication 4: at 13:42

## 2020-05-17 RX ADMIN — Medication 1 PACKET(S): at 13:37

## 2020-05-17 NOTE — PROGRESS NOTE ADULT - ASSESSMENT
74 yr old female with frontal lobe dementia, stroke, diabetes mellitus, hypertension, functional quadriplegia, chronic respiratory failure on trach collar, s/p PEG was sent in for evaluation  of hypoxia and fever.   Febrile Tm 101.1, Hypoxic Spo2 73% on TC. WBC 11.07, CRP 36, PCT 30, Ferritin 889. COVID-19 (+) at Nursing Home.    She was admitted to ICU, trach changed to cuff and was placed on full mechanical ventilation. She was on pressors, subsequently discontinued and was transferred to medical floor on 5/6/20. Noted to have UTI and sputum cultures growing Pseudomonas. RRT was called on 5/7 for hypoxia, CTA chest was done, negative for pulmonary embolism. CT head was done for concern for altered mental status, negative for stroke. Neurology consulted for evaluation of possible seizure like activity, advised likely secondary to hypoxia and palliative care evaluation was recommended. Cefepime was started for pneumonia. Goals of care discussed with spouse, patient is full code. Pulmonary was consulted for ventilator management. Her FiO2 requirements decreased on the ventilator.    Repeat sputum cultures with normal respiratory alejandro. Remain afebrile with normal WBC. To continue Cefepime through 5/21/20 as per ID recommendations. Pending Midline. Pulmonology recommending SBT.    Medically stable for discharge back to Nursing Home once midline in place and arrangements made

## 2020-05-17 NOTE — PROGRESS NOTE ADULT - SUBJECTIVE AND OBJECTIVE BOX
HOSPITALIST PROGRESS NOTE    BREA BECKHAM  [id number]  74yFemale    Patient is a 74y old  Female who presents with a chief complaint of acute respiratory failure (17 May 2020 11:30)      SUBJECTIVE:   Chart reviewed since last visit.  Patient seen and examined at bedside for COVID-19 pneumonia,. acute on chronic respiratory failure.  Nonverbal.  Had BM last night as per BRANDEN Quevedo      OBJECTIVE:  Vital Signs Last 24 Hrs  T(C): 36.8 (17 May 2020 08:16), Max: 37 (16 May 2020 23:34)  T(F): 98.2 (17 May 2020 08:16), Max: 98.6 (16 May 2020 23:34)  HR: 86 (17 May 2020 08:18) (52 - 97)  BP: 144/84 (17 May 2020 08:16) (136/80 - 147/73)   RR: 18 (16 May 2020 23:34) (18 - 18)  SpO2: 98% (17 May 2020 08:18) (95% - 100%)    PHYSICAL EXAMINATION  General: lying in bed, NAD  General: lying in bed, no acute distress, doesnt track or follow across midline -    NECK:  Trach+  CVS: regular rate and rhythm S1 S2  RESP:  Fair air entry bilaterally  GI:  G-tube+ Soft nondistended NT  : No suprapubic tenderness  MSK:  contracted extremities  CNS:  Eye opening to verbal stimuli, doesnt follow or track - contracted extremities  INTEG:  Unable to visualize sacrum  PSYCH:  Unable to obtain      MONITOR:  CAPILLARY BLOOD GLUCOSE      POCT Blood Glucose.: 133 mg/dL (17 May 2020 05:07)  POCT Blood Glucose.: 178 mg/dL (16 May 2020 23:46)  POCT Blood Glucose.: 127 mg/dL (16 May 2020 17:35)        I&O's Summary    16 May 2020 07:01  -  17 May 2020 07:00  --------------------------------------------------------  IN: 1910 mL / OUT: 2 mL / NET: 1908 mL                            10.0   6.39  )-----------( 305      ( 17 May 2020 12:17 )             32.3       05-17    137  |  102  |  12.0  ----------------------------<  167<H>  4.5   |  26.0  |  0.57    Ca    9.0      17 May 2020 12:17              Culture:    TTE:    RADIOLOGY        MEDICATIONS  (STANDING):  artificial  tears Solution 1 Drop(s) Both EYES three times a day  ascorbic acid 500 milliGRAM(s) Oral daily  calcium carbonate 1250 mG  + Vitamin D (OsCal 500 + D) 1 Tablet(s) Oral daily  cefepime   IVPB      cefepime   IVPB 2000 milliGRAM(s) IV Intermittent every 12 hours  chlorhexidine 0.12% Liquid 15 milliLiter(s) Oral Mucosa every 12 hours  chlorhexidine 4% Liquid 1 Application(s) Topical <User Schedule>  dextrose 5%. 1000 milliLiter(s) (50 mL/Hr) IV Continuous <Continuous>  dextrose 50% Injectable 12.5 Gram(s) IV Push once  dextrose 50% Injectable 25 Gram(s) IV Push once  dextrose 50% Injectable 25 Gram(s) IV Push once  enoxaparin Injectable 40 milliGRAM(s) SubCutaneous daily  fentaNYL   Patch  12 MICROgram(s)/Hr 1 Patch Transdermal every 72 hours  ferrous    sulfate Liquid 300 milliGRAM(s) Enteral Tube daily  insulin glargine Injectable (LANTUS) 10 Unit(s) SubCutaneous every morning  insulin glargine Injectable (LANTUS) 10 Unit(s) SubCutaneous at bedtime  insulin lispro (HumaLOG) corrective regimen sliding scale   SubCutaneous every 6 hours  montelukast 10 milliGRAM(s) Oral daily  pantoprazole   Suspension 40 milliGRAM(s) Oral daily  polyethylene glycol 3350 17 Gram(s) Oral daily  psyllium Powder 1 Packet(s) Oral three times a day  senna 2 Tablet(s) Oral daily      MEDICATIONS  (PRN):  acetaminophen    Suspension .. 650 milliGRAM(s) Oral every 6 hours PRN Temp greater or equal to 38C (100.4F)  dextrose 40% Gel 15 Gram(s) Oral once PRN Blood Glucose LESS THAN 70 milliGRAM(s)/deciliter  glucagon  Injectable 1 milliGRAM(s) IntraMuscular once PRN Glucose LESS THAN 70 milligrams/deciliter  LORazepam   Injectable 0.5 milliGRAM(s) IV Push every 4 hours PRN tachypnea

## 2020-05-17 NOTE — PROGRESS NOTE ADULT - NSHPATTENDINGPLANDISCUSS_GEN_ALL_CORE
Dr. Mcarthur.
Pretti daughter
Discussed with RNs Jasmin and Noel, Daughter Selam
Patient  Marciano, left voicemail for daughter Pretmarcial

## 2020-05-17 NOTE — PROGRESS NOTE ADULT - SUBJECTIVE AND OBJECTIVE BOX
[facility]  INFECTIOUS DISEASES AND INTERNAL MEDICINE at [place]  =======================================================  Sid Mendoza MD  Diplomates American Board of Internal Medicine and Infectious Diseases  =======================================================    [facility]-[id number]  BREA BECKHAM     Follow up:  acute respiratory failure     No fever , seems comfortable. Good O2 sat. on vent trough trach.     PAST MEDICAL & SURGICAL HISTORY:  Quadriplegia  COVID-19 virus detected  Advanced dementia  DM (diabetes mellitus)  HTN (hypertension)  CVA (cerebral vascular accident)  Respiratory failure  Constipation  GERD (gastroesophageal reflux disease)  Hypertension  Respiratory failure  Diabetes mellitus  Aphasic stroke  Dementia of frontal lobe type  Feeding by G-tube  Tracheostomy tube present  Tracheostomy in place  Gastrostomy in place  Hx of appendectomy    Social Hx: no smoking    FAMILY HISTORY:  No pertinent family history in first degree relatives    Allergies  codeine (Hives)    Antibiotics:   cefepime   IVPB 2000 milliGRAM(s) IV Intermittent once     REVIEW OF SYSTEMS:  Nonverbal     Physical Exam:    Vital Signs Last 24 Hrs  Vital Signs Last 24 Hrs  T(C): 36.8 (17 May 2020 08:16), Max: 37 (16 May 2020 23:34)  T(F): 98.2 (17 May 2020 08:16), Max: 98.6 (16 May 2020 23:34)  HR: 86 (17 May 2020 08:18) (52 - 97)  BP: 144/84 (17 May 2020 08:16) (136/80 - 147/73)  BP(mean): --  RR: 18 (16 May 2020 23:34) (18 - 18)  SpO2: 98% (17 May 2020 08:18) (95% - 100%)  GEN: NAD, trach on vent  HEENT: normocephalic and atraumatic.   NECK: Supple.  No lymphadenopathy   LUNGS: Clear to auscultation.  HEART: Regular rate and rhythm   ABDOMEN: Soft, nontender, and nondistended.  Positive bowel sounds. +PEG  EXTREMITIES: Without edema.  NEUROLOGIC: Contracted all extremities   PSYCHIATRIC: Awake but not communicative   SKIN: No ulceration or induration present.    Labs:  RECENT CULTURES:  05-14 @ 22:40 .Sputum Sputum     Normal Respiratory Elvira present    Numerous polymorphonuclear leukocytes per low power field  Moderate Squamous epithelial cells per low power field  Numerous Gram Negative Rods seen per oil power field    05-14 @ 22:18 .Urine Clean Catch (Midstream)     No growth    05-14 @ 17:51 .Blood Blood     No growth at 48 hours    05-05 @ 17:02 .Sputum Sputum Pseudomonas aeruginosa    Few Pseudomonas aeruginosa  Normal Respiratory Elvira present    Numerous polymorphonuclear leukocytes seen per low power field  Few Squamous epithelial cells seen per low power field  Moderate Gram Positive Rods seen per oil power field  Few Gram Negative Rods seen per oil power field  Few Gram positive cocci in pairs, chains and clusters seen per oil power  field    05-04 @ 21:22 .Urine Clean Catch (Midstream) Proteus mirabilis    >100,000 CFU/ml Proteus mirabilis    05-04 @ 14:01 .Blood Blood-Peripheral     No growth at 5 days.    All imaging and other data have been reviewed.  < from: CT Angio Chest w/ IV Cont (05.08.20 @ 14:13) >  EXAM:  CT ANGIO CHEST (W)AW IC                        PROCEDURE DATE:  05/08/2020    INTERPRETATION:  CLINICAL INFORMATION: Covid positive, shortness of breath, rule out PE  COMPARISON: 07/21/2017.  PROCEDURE:   CT Angiography of the Chest.  51 ml of Omnipaque 350 was injected intravenously. 49 ml were discarded.  Sagittal and coronal reformats were performed as well as 3D (MIP) reconstructions.  FINDINGS:  There is good opacification of the pulmonary arteries, however the study is markedly limited by patient motion. There is no saddle PE.  LUNGS AND AIRWAYS: Tracheostomy tube.  There bilateral diffuse patchy opacities.  PLEURA: Trace bilateral pleural effusions with associated atelectasis.  MEDIASTINUM AND JHONNY: The proximal esophagus is dilated and air-filled, similar to prior.  VESSELS: Within normal limits.  HEART: Heart size is normal. Small bowel pericardial effusion.  CHEST WALL AND LOWER NECK: Within normal limits.  VISUALIZED UPPER ABDOMEN: Gastrostomy tube in place.  BONES: Mild degenerative changes of the spine.  IMPRESSION:  Pattern of GGO suggests infection including atypical pneumonia/viral infection from atypical agents including COVID-19 (C19V-1).  Limited study due to respiratory motion. No saddle PE.  Small bilateral pleural effusions and small pericardial effusion.    Assessment and Plan:   73 y/o woman with with hx of frontal lobe dementia, stroke, functional quadriplegia, tracheostomy normally on trach collar, dysphagia s/p PEG, nursing home patient was admitted on 5/4 with with fever and worsening hypoxemia. She tested positive for COVID19 in the nursing home. Sputum culture showed pseudomonas and urine proteus so ID was called for recommendations. Patient is awake but not communicative. Due to worsening of respiration and higher FIO2 requirement and also possible seizure, CTA and head CT were done.    COVID 19 + infection  Viral pneumonia  Acute hypoxic respiratory failure  Bacterial pneumonia  UTI  Stroke  Chronic trach and PEG    - Blood, urine and sputum cultures are all negative on 5/14  - Airborne and contact isolation   - COVID 19 PCR positive   - CXR with multifocal opacities   - Procalcitonin 30.47-->0.3 improved significantly   - CRP=36.32-->9.59  - Blood culture negative  - Leukocytosis 15k-->9k  - Sputum culture with Pseudomonas pansensitive on 5/4  - Continue Cefepime 2gm q12   - Last day wound be 5/21.   She will get low grade fever and mild leukocytosis now and then, unless it is persistent or higher, no need for work up.     Will sign off please call with any question.

## 2020-05-17 NOTE — PROGRESS NOTE ADULT - ASSESSMENT
Remains stable on A/C ventilation  Transfer to vent facility for weaning contemplated  Cefipime to end on 5/21

## 2020-05-17 NOTE — PROGRESS NOTE ADULT - SUBJECTIVE AND OBJECTIVE BOX
PULMONARY PROGRESS NOTE      MALENA [id number]    Patient is a 74y old  Female who presents with a chief complaint of acute respiratory failure (17 May 2020 12:56)      INTERVAL HPI/OVERNIGHT EVENTS:    MEDICATIONS  (STANDING):  artificial  tears Solution 1 Drop(s) Both EYES three times a day  ascorbic acid 500 milliGRAM(s) Oral daily  calcium carbonate 1250 mG  + Vitamin D (OsCal 500 + D) 1 Tablet(s) Oral daily  cefepime   IVPB      cefepime   IVPB 2000 milliGRAM(s) IV Intermittent every 12 hours  chlorhexidine 0.12% Liquid 15 milliLiter(s) Oral Mucosa every 12 hours  chlorhexidine 4% Liquid 1 Application(s) Topical <User Schedule>  dextrose 5%. 1000 milliLiter(s) (50 mL/Hr) IV Continuous <Continuous>  dextrose 50% Injectable 12.5 Gram(s) IV Push once  dextrose 50% Injectable 25 Gram(s) IV Push once  dextrose 50% Injectable 25 Gram(s) IV Push once  enoxaparin Injectable 40 milliGRAM(s) SubCutaneous daily  fentaNYL   Patch  12 MICROgram(s)/Hr 1 Patch Transdermal every 72 hours  ferrous    sulfate Liquid 300 milliGRAM(s) Enteral Tube daily  insulin glargine Injectable (LANTUS) 10 Unit(s) SubCutaneous every morning  insulin glargine Injectable (LANTUS) 10 Unit(s) SubCutaneous at bedtime  insulin lispro (HumaLOG) corrective regimen sliding scale   SubCutaneous every 6 hours  lactobacillus acidophilus 1 Tablet(s) Oral two times a day  montelukast 10 milliGRAM(s) Oral daily  pantoprazole   Suspension 40 milliGRAM(s) Oral daily  polyethylene glycol 3350 17 Gram(s) Oral daily  psyllium Powder 1 Packet(s) Oral three times a day  senna 2 Tablet(s) Oral daily      MEDICATIONS  (PRN):  acetaminophen    Suspension .. 650 milliGRAM(s) Oral every 6 hours PRN Temp greater or equal to 38C (100.4F)  dextrose 40% Gel 15 Gram(s) Oral once PRN Blood Glucose LESS THAN 70 milliGRAM(s)/deciliter  glucagon  Injectable 1 milliGRAM(s) IntraMuscular once PRN Glucose LESS THAN 70 milligrams/deciliter  LORazepam   Injectable 0.5 milliGRAM(s) IV Push every 4 hours PRN tachypnea      Allergies    codeine (Hives)    Intolerances        PAST MEDICAL & SURGICAL HISTORY:  Quadriplegia  COVID-19 virus detected  Advanced dementia  DM (diabetes mellitus)  HTN (hypertension)  CVA (cerebral vascular accident)  Respiratory failure  Constipation  GERD (gastroesophageal reflux disease)  Hypertension  Respiratory failure  Diabetes mellitus  Aphasic stroke  Dementia of frontal lobe type  Feeding by G-tube  Tracheostomy tube present  Tracheostomy in place  Gastrostomy in place  Hx of appendectomy      SOCIAL HISTORY  Smoking History:       REVIEW OF SYSTEMS:    CONSTITUTIONAL:  No distress    HEENT:  Eyes:  No diplopia or blurred vision. ENT:  No earache, sore throat or runny nose.    CARDIOVASCULAR:  No pressure, squeezing, tightness, heaviness or aching about the chest; no palpitations.    RESPIRATORY:  No cough, shortness of breath, PND or orthopnea. Mild SOBOE    GASTROINTESTINAL:  No nausea, vomiting or diarrhea.    GENITOURINARY:  No dysuria, frequency or urgency.    MUSCULOSKELETAL:  No joint pain    SKIN:  No new lesions.    NEUROLOGIC:  No paresthesias, fasciculations, seizures or weakness.    PSYCHIATRIC:  No disorder of thought or mood.    ENDOCRINE:  No heat or cold intolerance, polyuria or polydipsia.    HEMATOLOGICAL:  No easy bruising or bleeding.     Vital Signs Last 24 Hrs  T(C): 36.8 (17 May 2020 08:16), Max: 37 (16 May 2020 23:34)  T(F): 98.2 (17 May 2020 08:16), Max: 98.6 (16 May 2020 23:34)  HR: 86 (17 May 2020 08:18) (52 - 97)  BP: 144/84 (17 May 2020 08:16) (136/80 - 147/73)  BP(mean): --  RR: 18 (16 May 2020 23:34) (18 - 18)  SpO2: 98% (17 May 2020 08:18) (95% - 100%)    PHYSICAL EXAMINATION:    GENERAL: The patient is awake and alert in no apparent distress.     HEENT: Head is normocephalic and atraumatic. Extraocular muscles are intact. Mucous membranes are moist.    NECK: Supple.    LUNGS: Clear to auscultation without wheezing, rales or rhonchi; respirations unlabored    HEART: Regular rate and rhythm without murmur.    ABDOMEN: Soft, nontender, and nondistended.      EXTREMITIES: Without any cyanosis, clubbing, rash, lesions or edema.    NEUROLOGIC: Grossly intact.    SKIN: No ulceration or induration present.      LABS:                        10.0   6.39  )-----------( 305      ( 17 May 2020 12:17 )             32.3     05-17    137  |  102  |  12.0  ----------------------------<  167<H>  4.5   |  26.0  |  0.57    Ca    9.0      17 May 2020 12:17                          MICROBIOLOGY:    RADIOLOGY & ADDITIONAL STUDIES:

## 2020-05-18 ENCOUNTER — TRANSCRIPTION ENCOUNTER (OUTPATIENT)
Age: 75
End: 2020-05-18

## 2020-05-18 VITALS
DIASTOLIC BLOOD PRESSURE: 78 MMHG | SYSTOLIC BLOOD PRESSURE: 146 MMHG | HEART RATE: 82 BPM | TEMPERATURE: 99 F | RESPIRATION RATE: 20 BRPM | OXYGEN SATURATION: 96 %

## 2020-05-18 LAB
GLUCOSE BLDC GLUCOMTR-MCNC: 159 MG/DL — HIGH (ref 70–99)
GLUCOSE BLDC GLUCOMTR-MCNC: 167 MG/DL — HIGH (ref 70–99)
GLUCOSE BLDC GLUCOMTR-MCNC: 174 MG/DL — HIGH (ref 70–99)

## 2020-05-18 PROCEDURE — 99238 HOSP IP/OBS DSCHRG MGMT 30/<: CPT

## 2020-05-18 PROCEDURE — 99232 SBSQ HOSP IP/OBS MODERATE 35: CPT | Mod: CS

## 2020-05-18 RX ORDER — FENTANYL CITRATE 50 UG/ML
1 INJECTION INTRAVENOUS
Qty: 0 | Refills: 0 | DISCHARGE
Start: 2020-05-18

## 2020-05-18 RX ORDER — INSULIN LISPRO 100/ML
0 VIAL (ML) SUBCUTANEOUS
Qty: 0 | Refills: 0 | DISCHARGE
Start: 2020-05-18

## 2020-05-18 RX ORDER — SENNA PLUS 8.6 MG/1
2 TABLET ORAL
Qty: 0 | Refills: 0 | DISCHARGE
Start: 2020-05-18

## 2020-05-18 RX ORDER — CEFEPIME 1 G/1
2 INJECTION, POWDER, FOR SOLUTION INTRAMUSCULAR; INTRAVENOUS
Qty: 0 | Refills: 0 | DISCHARGE
Start: 2020-05-18

## 2020-05-18 RX ORDER — PSYLLIUM SEED (WITH DEXTROSE)
1 POWDER (GRAM) ORAL
Qty: 0 | Refills: 0 | DISCHARGE
Start: 2020-05-18

## 2020-05-18 RX ORDER — CHLORHEXIDINE GLUCONATE 213 G/1000ML
15 SOLUTION TOPICAL
Qty: 0 | Refills: 0 | DISCHARGE
Start: 2020-05-18

## 2020-05-18 RX ORDER — SIMETHICONE 80 MG/1
0.92 TABLET, CHEWABLE ORAL
Qty: 0 | Refills: 0 | DISCHARGE

## 2020-05-18 RX ORDER — LORATADINE 10 MG/1
1 TABLET ORAL
Qty: 0 | Refills: 0 | DISCHARGE

## 2020-05-18 RX ORDER — LACTULOSE 10 G/15ML
30 SOLUTION ORAL
Qty: 0 | Refills: 0 | DISCHARGE

## 2020-05-18 RX ORDER — FENTANYL CITRATE 50 UG/ML
1 INJECTION INTRAVENOUS
Qty: 0 | Refills: 0 | DISCHARGE

## 2020-05-18 RX ORDER — FAMOTIDINE 10 MG/ML
2 INJECTION INTRAVENOUS
Qty: 0 | Refills: 0 | DISCHARGE

## 2020-05-18 RX ORDER — ATROPINE SULFATE 1 %
1 DROPS OPHTHALMIC (EYE)
Qty: 0 | Refills: 0 | DISCHARGE

## 2020-05-18 RX ORDER — FERROUS SULFATE 325(65) MG
5 TABLET ORAL
Qty: 0 | Refills: 0 | DISCHARGE
Start: 2020-05-18

## 2020-05-18 RX ORDER — ALBUTEROL 90 UG/1
2 AEROSOL, METERED ORAL
Qty: 0 | Refills: 0 | DISCHARGE

## 2020-05-18 RX ORDER — ACETAMINOPHEN 500 MG
2 TABLET ORAL
Qty: 0 | Refills: 0 | DISCHARGE

## 2020-05-18 RX ORDER — ACETAMINOPHEN 500 MG
650 TABLET ORAL
Qty: 0 | Refills: 0 | DISCHARGE

## 2020-05-18 RX ORDER — LACTOBACILLUS ACIDOPHILUS 100MM CELL
1 CAPSULE ORAL
Qty: 0 | Refills: 0 | DISCHARGE

## 2020-05-18 RX ORDER — HYDROCORTISONE 1 %
1 OINTMENT (GRAM) TOPICAL
Qty: 0 | Refills: 0 | DISCHARGE

## 2020-05-18 RX ADMIN — Medication 1 PACKET(S): at 05:06

## 2020-05-18 RX ADMIN — Medication 300 MILLIGRAM(S): at 11:12

## 2020-05-18 RX ADMIN — INSULIN GLARGINE 10 UNIT(S): 100 INJECTION, SOLUTION SUBCUTANEOUS at 08:20

## 2020-05-18 RX ADMIN — MONTELUKAST 10 MILLIGRAM(S): 4 TABLET, CHEWABLE ORAL at 11:12

## 2020-05-18 RX ADMIN — PANTOPRAZOLE SODIUM 40 MILLIGRAM(S): 20 TABLET, DELAYED RELEASE ORAL at 11:13

## 2020-05-18 RX ADMIN — Medication 2: at 11:13

## 2020-05-18 RX ADMIN — FENTANYL CITRATE 1 PATCH: 50 INJECTION INTRAVENOUS at 05:35

## 2020-05-18 RX ADMIN — SENNA PLUS 2 TABLET(S): 8.6 TABLET ORAL at 11:13

## 2020-05-18 RX ADMIN — CHLORHEXIDINE GLUCONATE 1 APPLICATION(S): 213 SOLUTION TOPICAL at 05:06

## 2020-05-18 RX ADMIN — Medication 2: at 05:30

## 2020-05-18 RX ADMIN — ENOXAPARIN SODIUM 40 MILLIGRAM(S): 100 INJECTION SUBCUTANEOUS at 11:12

## 2020-05-18 RX ADMIN — POLYETHYLENE GLYCOL 3350 17 GRAM(S): 17 POWDER, FOR SOLUTION ORAL at 11:12

## 2020-05-18 RX ADMIN — Medication 1 TABLET(S): at 11:12

## 2020-05-18 RX ADMIN — CHLORHEXIDINE GLUCONATE 15 MILLILITER(S): 213 SOLUTION TOPICAL at 05:35

## 2020-05-18 RX ADMIN — Medication 500 MILLIGRAM(S): at 11:12

## 2020-05-18 RX ADMIN — Medication 1 TABLET(S): at 05:06

## 2020-05-18 RX ADMIN — CEFEPIME 100 MILLIGRAM(S): 1 INJECTION, POWDER, FOR SOLUTION INTRAMUSCULAR; INTRAVENOUS at 05:06

## 2020-05-18 NOTE — PROGRESS NOTE ADULT - PROBLEM SELECTOR PROBLEM 3
Advanced dementia
COVID-19 virus detected
Pneumonia
UTI (urinary tract infection)

## 2020-05-18 NOTE — PROGRESS NOTE ADULT - PROBLEM SELECTOR PLAN 5
-Continue Lantus, monitor fingersticks. PEG feeds.
-Supportive care
Continue Lantus, monitor fingersticks. PEG feeds.

## 2020-05-18 NOTE — PROGRESS NOTE ADULT - PROBLEM SELECTOR PLAN 3
-On cefepime until 5/21, will need midline  -Pt is afebrile, no temp since yesterday morning
On cefepime until 5/21, will need mid line however pt recorded low grade temp overnight so will hold off on placing line at this time. See plan under PNA
On cefepime, will need mid line. ID following.
On cefepime.
Sputum cultures with Pseudomonas pansensitive   Has been on cefepime per ID with end date 5/21  - trending CBC, no leukocytosis  -ID follow up appreciated
On cefepime.

## 2020-05-18 NOTE — PROGRESS NOTE ADULT - ASSESSMENT
74 yr old female with frontal lobe dementia, stroke, diabetes mellitus, hypertension, functional quadriplegia, chronic respiratory failure on trach collar, s/p PEG was sent in for evaluation  of hypoxia and fever.   Febrile Tm 101.1, Hypoxic Spo2 73% on TC. WBC 11.07, CRP 36, PCT 30, Ferritin 889. COVID-19 (+) at Nursing Home.    She was admitted to ICU, trach changed to cuff and was placed on full mechanical ventilation. She was on pressors, subsequently discontinued and was transferred to medical floor on 5/6/20. Noted to have UTI and sputum cultures growing Pseudomonas. RRT was called on 5/7 for hypoxia, CTA chest was done, negative for pulmonary embolism. CT head was done for concern for altered mental status, negative for stroke. Neurology consulted for evaluation of possible seizure like activity, advised likely secondary to hypoxia and palliative care evaluation was recommended. Cefepime was started for pneumonia. Goals of care discussed with spouse, patient is full code. Pulmonary was consulted for ventilator management. Her FiO2 requirements decreased on the ventilator.    Repeat sputum cultures with normal respiratory alejandro. Remain afebrile with normal WBC. To continue Cefepime through 5/21/20 as per ID recommendations. s/p midline  Midline. Pulmonology recommending SBT.    Medically stable for discharge back to Nursing Home

## 2020-05-18 NOTE — PROGRESS NOTE ADULT - PROBLEM SELECTOR PLAN 2
-On cefepime until 5/21, midline in place   -Pt is afebrile
-Supportive care
Supportive care
Supportive care.

## 2020-05-18 NOTE — PROGRESS NOTE ADULT - PROBLEM SELECTOR PROBLEM 4
Advanced dementia
DM (diabetes mellitus)
Pneumonia

## 2020-05-18 NOTE — PROGRESS NOTE ADULT - PROBLEM SELECTOR PROBLEM 2
HTN (hypertension)
HTN (hypertension)
Advanced dementia
UTI (urinary tract infection)
Advanced dementia

## 2020-05-18 NOTE — PROGRESS NOTE ADULT - PROBLEM SELECTOR PROBLEM 5
Advanced dementia
DM (diabetes mellitus)

## 2020-05-18 NOTE — PROGRESS NOTE ADULT - REASON FOR ADMISSION
acute respiratory failure

## 2020-05-18 NOTE — PROGRESS NOTE ADULT - PROBLEM SELECTOR PLAN 1
-2/2 COVID 19 infection and bacterial pneumonia  -CTA neg for PE  -Chronic trach  -Continue ventilator support and weaning trials as able   -Continue Cefepime as per ID recs   -Pulm consult appreciated, stable to transfer to a skilled nursing facility/the [facility] facility for further weaning

## 2020-05-18 NOTE — PROGRESS NOTE ADULT - PROBLEM SELECTOR PLAN 4
-Continue Lantus, monitor fingersticks. PEG feeds.
As above, WBC improving, hypoxic improving. Blood cultures negative, afebrile.
As above.
Sputum cultures with Pseudomonas pansensitive   Has been on cefepime per ID with end date 5/21  - no fever since yesterday morning   - trending CBC, no leukocytosis  -ID following
Sputum cultures with Pseudomonas pansensitive   Has been on cefepime per ID with end date 5/21  However early this morning with low grade temp 100.7 and tachy at times   Will check cbc and monitor  If Spikes again will panculture  ID following
As above.

## 2020-05-18 NOTE — PROGRESS NOTE ADULT - SUBJECTIVE AND OBJECTIVE BOX
HOSPITALIST PROGRESS NOTE    Patient is a 74y old  Female who presents with a chief complaint of acute respiratory failure       Vital Signs Last 24 Hrs  T(C): 36.9 (18 May 2020 08:04), Max: 37.2 (17 May 2020 15:41)  T(F): 98.4 (18 May 2020 08:04), Max: 99 (17 May 2020 15:41)  HR: 76 (18 May 2020 08:11) (76 - 107)  BP: 149/82 (18 May 2020 08:04) (149/73 - 153/65)  BP(mean): --  RR: 16 (18 May 2020 08:04) (16 - 18)  SpO2: 97% (18 May 2020 08:11) (97% - 98%)    PHYSICAL EXAMINATION  General: lying in bed, NAD  General: lying in bed, no acute distress, doesnt track or follow across midline -    NECK:  Trach+  CVS: regular rate and rhythm S1 S2  RESP:  anteriorly CTA , no rhonchi   GI:  G-tube+ Soft nondistended NT  : No suprapubic tenderness  MSK:  contracted extremities upper lower extremities   CNS:  Eyes open ,  does not  follow or track - contracted extremities      CAPILLARY BLOOD GLUCOSE      POCT Blood Glucose.: 159 mg/dL (18 May 2020 11:11)  POCT Blood Glucose.: 174 mg/dL (18 May 2020 08:16)  POCT Blood Glucose.: 167 mg/dL (18 May 2020 05:22)  POCT Blood Glucose.: 134 mg/dL (17 May 2020 23:37)  POCT Blood Glucose.: 136 mg/dL (17 May 2020 17:38)  POCT Blood Glucose.: 207 mg/dL (17 May 2020 13:41)                              10.0   6.39  )-----------( 305      ( 17 May 2020 12:17 )             32.3   05-17    137  |  102  |  12.0  ----------------------------<  167<H>  4.5   |  26.0  |  0.57    Ca    9.0      17 May 2020 12:17      MEDICATIONS  (STANDING):  artificial  tears Solution 1 Drop(s) Both EYES three times a day  ascorbic acid 500 milliGRAM(s) Oral daily  calcium carbonate 1250 mG  + Vitamin D (OsCal 500 + D) 1 Tablet(s) Oral daily  cefepime   IVPB      cefepime   IVPB 2000 milliGRAM(s) IV Intermittent every 12 hours  chlorhexidine 0.12% Liquid 15 milliLiter(s) Oral Mucosa every 12 hours  chlorhexidine 4% Liquid 1 Application(s) Topical <User Schedule>  dextrose 5%. 1000 milliLiter(s) (50 mL/Hr) IV Continuous <Continuous>  dextrose 50% Injectable 12.5 Gram(s) IV Push once  dextrose 50% Injectable 25 Gram(s) IV Push once  dextrose 50% Injectable 25 Gram(s) IV Push once  enoxaparin Injectable 40 milliGRAM(s) SubCutaneous daily  fentaNYL   Patch  12 MICROgram(s)/Hr 1 Patch Transdermal every 72 hours  ferrous    sulfate Liquid 300 milliGRAM(s) Enteral Tube daily  insulin glargine Injectable (LANTUS) 10 Unit(s) SubCutaneous every morning  insulin glargine Injectable (LANTUS) 10 Unit(s) SubCutaneous at bedtime  insulin lispro (HumaLOG) corrective regimen sliding scale   SubCutaneous every 6 hours  lactobacillus acidophilus 1 Tablet(s) Oral two times a day  montelukast 10 milliGRAM(s) Oral daily  pantoprazole   Suspension 40 milliGRAM(s) Oral daily  polyethylene glycol 3350 17 Gram(s) Oral daily  psyllium Powder 1 Packet(s) Oral three times a day  senna 2 Tablet(s) Oral daily

## 2020-05-18 NOTE — PROGRESS NOTE ADULT - ASSESSMENT
Imp--COVID infection, was chronic trach prior to hosp.  Plan--weaning trials as sal  return to vent facility when able.

## 2020-05-18 NOTE — PROGRESS NOTE ADULT - SUBJECTIVE AND OBJECTIVE BOX
Patient is a 74y old  Female who presents with a chief complaint of acute respiratory failure (17 May 2020 13:17)        Events last 24 hours: not sal weaning trials--has rapid hr.    PAST MEDICAL & SURGICAL HISTORY:  Quadriplegia  COVID-19 virus detected  Advanced dementia  DM (diabetes mellitus)  HTN (hypertension)  CVA (cerebral vascular accident)  Respiratory failure  Constipation  GERD (gastroesophageal reflux disease)  Hypertension  Respiratory failure  Diabetes mellitus  Aphasic stroke  Dementia of frontal lobe type  Feeding by G-tube  Tracheostomy tube present  Tracheostomy in place  Gastrostomy in place  Hx of appendectomy      Review of Systems:  Unable      Medications:  cefepime   IVPB      cefepime   IVPB 2000 milliGRAM(s) IV Intermittent every 12 hours      montelukast 10 milliGRAM(s) Oral daily    acetaminophen    Suspension .. 650 milliGRAM(s) Oral every 6 hours PRN  fentaNYL   Patch  12 MICROgram(s)/Hr 1 Patch Transdermal every 72 hours  LORazepam   Injectable 0.5 milliGRAM(s) IV Push every 4 hours PRN      enoxaparin Injectable 40 milliGRAM(s) SubCutaneous daily    pantoprazole   Suspension 40 milliGRAM(s) Oral daily  polyethylene glycol 3350 17 Gram(s) Oral daily  psyllium Powder 1 Packet(s) Oral three times a day  senna 2 Tablet(s) Oral daily      dextrose 40% Gel 15 Gram(s) Oral once PRN  dextrose 50% Injectable 12.5 Gram(s) IV Push once  dextrose 50% Injectable 25 Gram(s) IV Push once  dextrose 50% Injectable 25 Gram(s) IV Push once  glucagon  Injectable 1 milliGRAM(s) IntraMuscular once PRN  insulin glargine Injectable (LANTUS) 10 Unit(s) SubCutaneous every morning  insulin glargine Injectable (LANTUS) 10 Unit(s) SubCutaneous at bedtime  insulin lispro (HumaLOG) corrective regimen sliding scale   SubCutaneous every 6 hours    ascorbic acid 500 milliGRAM(s) Oral daily  calcium carbonate 1250 mG  + Vitamin D (OsCal 500 + D) 1 Tablet(s) Oral daily  dextrose 5%. 1000 milliLiter(s) IV Continuous <Continuous>  ferrous    sulfate Liquid 300 milliGRAM(s) Enteral Tube daily      artificial  tears Solution 1 Drop(s) Both EYES three times a day  chlorhexidine 0.12% Liquid 15 milliLiter(s) Oral Mucosa every 12 hours  chlorhexidine 4% Liquid 1 Application(s) Topical <User Schedule>    lactobacillus acidophilus 1 Tablet(s) Oral two times a day      Mode: AC/ CMV (Assist Control/ Continuous Mandatory Ventilation)  RR (machine): 15  TV (machine): 420  FiO2: 30  PEEP: 5  ITime: 0.8  MAP: 11  PIP: 31      ICU Vital Signs Last 24 Hrs  T(C): 36.9 (18 May 2020 08:04), Max: 37.2 (17 May 2020 15:41)  T(F): 98.4 (18 May 2020 08:04), Max: 99 (17 May 2020 15:41)  HR: 76 (18 May 2020 08:11) (76 - 107)  BP: 149/82 (18 May 2020 08:04) (149/73 - 153/65)  BP(mean): --  ABP: --  ABP(mean): --  RR: 16 (18 May 2020 08:04) (16 - 18)  SpO2: 97% (18 May 2020 08:11) (97% - 98%)          I&O's Detail    17 May 2020 07:01  -  18 May 2020 07:00  --------------------------------------------------------  IN:    Free Water: 250 mL    Glucerna 1.5: 390 mL  Total IN: 640 mL    OUT:  Total OUT: 0 mL    Total NET: 640 mL            LABS:                        10.0   6.39  )-----------( 305      ( 17 May 2020 12:17 )             32.3     05-17    137  |  102  |  12.0  ----------------------------<  167<H>  4.5   |  26.0  |  0.57    Ca    9.0      17 May 2020 12:17            CAPILLARY BLOOD GLUCOSE      POCT Blood Glucose.: 159 mg/dL (18 May 2020 11:11)        CULTURES:  Culture Results:   Normal Respiratory Elvira present (05-14 @ 22:40)  Culture Results:   No growth (05-14 @ 22:18)  Culture Results:   No growth at 48 hours (05-14 @ 17:51)  Culture Results:   No growth at 48 hours (05-14 @ 17:51)      Physical Examination:    Exam is limited due to   COVID-19 situation.    DEVICES:     RADIOLOGY: ***    CRITICAL CARE TIME SPENT: ***

## 2020-05-18 NOTE — PROGRESS NOTE ADULT - PROBLEM SELECTOR PROBLEM 1
Acute respiratory failure with hypoxia
Pneumonia
Acute respiratory failure with hypoxia

## 2020-05-18 NOTE — DISCHARGE NOTE NURSING/CASE MANAGEMENT/SOCIAL WORK - PATIENT PORTAL LINK FT
You can access the FollowMyHealth Patient Portal offered by [facility] by registering at the following website: [email or web address]. By joining [email or web address] [email or web address] portal, you will also be able to view your health information using other applications (apps) compatible with our system.

## 2020-05-28 ENCOUNTER — INPATIENT (INPATIENT)
Facility: HOSPITAL | Age: 75
LOS: 7 days | Discharge: OTHER INSTITUTION | DRG: 870 | End: 2020-06-05
Attending: INTERNAL MEDICINE | Admitting: INTERNAL MEDICINE
Payer: MEDICARE

## 2020-05-28 ENCOUNTER — EMERGENCY (EMERGENCY)
Facility: HOSPITAL | Age: 75
LOS: 1 days | Discharge: ANOTHER HOSPITAL | End: 2020-05-28
Attending: EMERGENCY MEDICINE | Admitting: EMERGENCY MEDICINE
Payer: MEDICARE

## 2020-05-28 VITALS
RESPIRATION RATE: 34 BRPM | SYSTOLIC BLOOD PRESSURE: 161 MMHG | DIASTOLIC BLOOD PRESSURE: 67 MMHG | OXYGEN SATURATION: 100 % | HEART RATE: 116 BPM

## 2020-05-28 VITALS — HEIGHT: 65 IN | WEIGHT: 139.99 LBS

## 2020-05-28 VITALS
HEIGHT: 65 IN | RESPIRATION RATE: 36 BRPM | HEART RATE: 114 BPM | TEMPERATURE: 101 F | DIASTOLIC BLOOD PRESSURE: 70 MMHG | SYSTOLIC BLOOD PRESSURE: 164 MMHG | WEIGHT: 139.99 LBS | OXYGEN SATURATION: 95 %

## 2020-05-28 DIAGNOSIS — J96.90 RESPIRATORY FAILURE, UNSPECIFIED, UNSPECIFIED WHETHER WITH HYPOXIA OR HYPERCAPNIA: ICD-10-CM

## 2020-05-28 DIAGNOSIS — A41.9 SEPSIS, UNSPECIFIED ORGANISM: ICD-10-CM

## 2020-05-28 DIAGNOSIS — I63.9 CEREBRAL INFARCTION, UNSPECIFIED: ICD-10-CM

## 2020-05-28 DIAGNOSIS — Z93.0 TRACHEOSTOMY STATUS: Chronic | ICD-10-CM

## 2020-05-28 DIAGNOSIS — J18.9 PNEUMONIA, UNSPECIFIED ORGANISM: ICD-10-CM

## 2020-05-28 DIAGNOSIS — E11.9 TYPE 2 DIABETES MELLITUS WITHOUT COMPLICATIONS: ICD-10-CM

## 2020-05-28 DIAGNOSIS — Z93.1 GASTROSTOMY STATUS: Chronic | ICD-10-CM

## 2020-05-28 DIAGNOSIS — Z29.9 ENCOUNTER FOR PROPHYLACTIC MEASURES, UNSPECIFIED: ICD-10-CM

## 2020-05-28 DIAGNOSIS — E87.5 HYPERKALEMIA: ICD-10-CM

## 2020-05-28 DIAGNOSIS — F03.90 UNSPECIFIED DEMENTIA WITHOUT BEHAVIORAL DISTURBANCE: ICD-10-CM

## 2020-05-28 DIAGNOSIS — K21.9 GASTRO-ESOPHAGEAL REFLUX DISEASE WITHOUT ESOPHAGITIS: ICD-10-CM

## 2020-05-28 DIAGNOSIS — U07.1 COVID-19: ICD-10-CM

## 2020-05-28 LAB
ALBUMIN SERPL ELPH-MCNC: 3.1 G/DL — LOW (ref 3.3–5)
ALP SERPL-CCNC: 106 U/L — SIGNIFICANT CHANGE UP (ref 30–120)
ALT FLD-CCNC: 26 U/L DA — SIGNIFICANT CHANGE UP (ref 10–60)
ANION GAP SERPL CALC-SCNC: 20 MMOL/L — HIGH (ref 5–17)
APPEARANCE UR: ABNORMAL
APTT BLD: 50.6 SEC — HIGH (ref 28.5–37)
AST SERPL-CCNC: 38 U/L — SIGNIFICANT CHANGE UP (ref 10–40)
BACTERIA # UR AUTO: ABNORMAL
BASOPHILS # BLD AUTO: 0.07 K/UL — SIGNIFICANT CHANGE UP (ref 0–0.2)
BASOPHILS NFR BLD AUTO: 0.3 % — SIGNIFICANT CHANGE UP (ref 0–2)
BILIRUB SERPL-MCNC: 0.5 MG/DL — SIGNIFICANT CHANGE UP (ref 0.2–1.2)
BILIRUB UR-MCNC: NEGATIVE — SIGNIFICANT CHANGE UP
BUN SERPL-MCNC: 27 MG/DL — HIGH (ref 7–23)
CALCIUM SERPL-MCNC: 10.4 MG/DL — SIGNIFICANT CHANGE UP (ref 8.4–10.5)
CHLORIDE SERPL-SCNC: 98 MMOL/L — SIGNIFICANT CHANGE UP (ref 96–108)
CO2 SERPL-SCNC: 21 MMOL/L — LOW (ref 22–31)
COLOR SPEC: YELLOW — SIGNIFICANT CHANGE UP
CREAT SERPL-MCNC: 1.37 MG/DL — HIGH (ref 0.5–1.3)
D DIMER BLD IA.RAPID-MCNC: 668 NG/ML DDU — HIGH
DIFF PNL FLD: ABNORMAL
EOSINOPHIL # BLD AUTO: 0.17 K/UL — SIGNIFICANT CHANGE UP (ref 0–0.5)
EOSINOPHIL NFR BLD AUTO: 0.8 % — SIGNIFICANT CHANGE UP (ref 0–6)
EPI CELLS # UR: SIGNIFICANT CHANGE UP
GLUCOSE SERPL-MCNC: 298 MG/DL — HIGH (ref 70–99)
GLUCOSE UR QL: NEGATIVE MG/DL — SIGNIFICANT CHANGE UP
HCT VFR BLD CALC: 40.1 % — SIGNIFICANT CHANGE UP (ref 34.5–45)
HGB BLD-MCNC: 11.9 G/DL — SIGNIFICANT CHANGE UP (ref 11.5–15.5)
IMM GRANULOCYTES NFR BLD AUTO: 0.7 % — SIGNIFICANT CHANGE UP (ref 0–1.5)
INR BLD: 1.14 RATIO — SIGNIFICANT CHANGE UP (ref 0.88–1.16)
KETONES UR-MCNC: NEGATIVE — SIGNIFICANT CHANGE UP
LACTATE SERPL-SCNC: 10.5 MMOL/L — CRITICAL HIGH (ref 0.7–2)
LACTATE SERPL-SCNC: 13.3 MMOL/L — CRITICAL HIGH (ref 0.7–2)
LEUKOCYTE ESTERASE UR-ACNC: ABNORMAL
LYMPHOCYTES # BLD AUTO: 16.7 % — SIGNIFICANT CHANGE UP (ref 13–44)
LYMPHOCYTES # BLD AUTO: 3.62 K/UL — HIGH (ref 1–3.3)
MCHC RBC-ENTMCNC: 27.4 PG — SIGNIFICANT CHANGE UP (ref 27–34)
MCHC RBC-ENTMCNC: 29.7 GM/DL — LOW (ref 32–36)
MCV RBC AUTO: 92.4 FL — SIGNIFICANT CHANGE UP (ref 80–100)
MONOCYTES # BLD AUTO: 1.15 K/UL — HIGH (ref 0–0.9)
MONOCYTES NFR BLD AUTO: 5.3 % — SIGNIFICANT CHANGE UP (ref 2–14)
NEUTROPHILS # BLD AUTO: 16.51 K/UL — HIGH (ref 1.8–7.4)
NEUTROPHILS NFR BLD AUTO: 76.2 % — SIGNIFICANT CHANGE UP (ref 43–77)
NITRITE UR-MCNC: NEGATIVE — SIGNIFICANT CHANGE UP
NRBC # BLD: 0 /100 WBCS — SIGNIFICANT CHANGE UP (ref 0–0)
PH UR: 7 — SIGNIFICANT CHANGE UP (ref 5–8)
PLATELET # BLD AUTO: 386 K/UL — SIGNIFICANT CHANGE UP (ref 150–400)
POTASSIUM SERPL-MCNC: 6 MMOL/L — HIGH (ref 3.5–5.3)
POTASSIUM SERPL-SCNC: 6 MMOL/L — HIGH (ref 3.5–5.3)
PROT SERPL-MCNC: 8.7 G/DL — HIGH (ref 6–8.3)
PROT UR-MCNC: 30 MG/DL
PROTHROM AB SERPL-ACNC: 12.7 SEC — SIGNIFICANT CHANGE UP (ref 10–12.9)
RBC # BLD: 4.34 M/UL — SIGNIFICANT CHANGE UP (ref 3.8–5.2)
RBC # FLD: 15.6 % — HIGH (ref 10.3–14.5)
RBC CASTS # UR COMP ASSIST: SIGNIFICANT CHANGE UP /HPF (ref 0–4)
SARS-COV-2 RNA SPEC QL NAA+PROBE: SIGNIFICANT CHANGE UP
SODIUM SERPL-SCNC: 139 MMOL/L — SIGNIFICANT CHANGE UP (ref 135–145)
SP GR SPEC: 1.01 — SIGNIFICANT CHANGE UP (ref 1.01–1.02)
UROBILINOGEN FLD QL: NEGATIVE MG/DL — SIGNIFICANT CHANGE UP
WBC # BLD: 21.68 K/UL — HIGH (ref 3.8–10.5)
WBC # FLD AUTO: 21.68 K/UL — HIGH (ref 3.8–10.5)
WBC UR QL: SIGNIFICANT CHANGE UP

## 2020-05-28 PROCEDURE — 71045 X-RAY EXAM CHEST 1 VIEW: CPT

## 2020-05-28 PROCEDURE — 96367 TX/PROPH/DG ADDL SEQ IV INF: CPT

## 2020-05-28 PROCEDURE — 86140 C-REACTIVE PROTEIN: CPT

## 2020-05-28 PROCEDURE — 71045 X-RAY EXAM CHEST 1 VIEW: CPT | Mod: 26

## 2020-05-28 PROCEDURE — 80053 COMPREHEN METABOLIC PANEL: CPT

## 2020-05-28 PROCEDURE — 85027 COMPLETE CBC AUTOMATED: CPT

## 2020-05-28 PROCEDURE — 94664 DEMO&/EVAL PT USE INHALER: CPT

## 2020-05-28 PROCEDURE — 87086 URINE CULTURE/COLONY COUNT: CPT

## 2020-05-28 PROCEDURE — 85730 THROMBOPLASTIN TIME PARTIAL: CPT

## 2020-05-28 PROCEDURE — 36415 COLL VENOUS BLD VENIPUNCTURE: CPT

## 2020-05-28 PROCEDURE — 85610 PROTHROMBIN TIME: CPT

## 2020-05-28 PROCEDURE — 99291 CRITICAL CARE FIRST HOUR: CPT

## 2020-05-28 PROCEDURE — 81001 URINALYSIS AUTO W/SCOPE: CPT

## 2020-05-28 PROCEDURE — 94799 UNLISTED PULMONARY SVC/PX: CPT

## 2020-05-28 PROCEDURE — 99284 EMERGENCY DEPT VISIT MOD MDM: CPT

## 2020-05-28 PROCEDURE — 99291 CRITICAL CARE FIRST HOUR: CPT | Mod: 25

## 2020-05-28 PROCEDURE — 83605 ASSAY OF LACTIC ACID: CPT

## 2020-05-28 PROCEDURE — 85379 FIBRIN DEGRADATION QUANT: CPT

## 2020-05-28 PROCEDURE — 87040 BLOOD CULTURE FOR BACTERIA: CPT

## 2020-05-28 PROCEDURE — 93010 ELECTROCARDIOGRAM REPORT: CPT

## 2020-05-28 PROCEDURE — 94002 VENT MGMT INPAT INIT DAY: CPT

## 2020-05-28 PROCEDURE — 82728 ASSAY OF FERRITIN: CPT

## 2020-05-28 PROCEDURE — 93005 ELECTROCARDIOGRAM TRACING: CPT

## 2020-05-28 PROCEDURE — 84145 PROCALCITONIN (PCT): CPT

## 2020-05-28 PROCEDURE — 96365 THER/PROPH/DIAG IV INF INIT: CPT

## 2020-05-28 RX ORDER — ACETAMINOPHEN 500 MG
650 TABLET ORAL ONCE
Refills: 0 | Status: COMPLETED | OUTPATIENT
Start: 2020-05-28 | End: 2020-05-28

## 2020-05-28 RX ORDER — SODIUM CHLORIDE 9 MG/ML
1000 INJECTION INTRAMUSCULAR; INTRAVENOUS; SUBCUTANEOUS ONCE
Refills: 0 | Status: COMPLETED | OUTPATIENT
Start: 2020-05-28 | End: 2020-05-28

## 2020-05-28 RX ORDER — HEPARIN SODIUM 5000 [USP'U]/ML
5000 INJECTION INTRAVENOUS; SUBCUTANEOUS EVERY 8 HOURS
Refills: 0 | Status: DISCONTINUED | OUTPATIENT
Start: 2020-05-28 | End: 2020-05-28

## 2020-05-28 RX ORDER — VANCOMYCIN HCL 1 G
1000 VIAL (EA) INTRAVENOUS ONCE
Refills: 0 | Status: COMPLETED | OUTPATIENT
Start: 2020-05-28 | End: 2020-05-28

## 2020-05-28 RX ORDER — PANTOPRAZOLE SODIUM 20 MG/1
40 TABLET, DELAYED RELEASE ORAL
Qty: 0 | Refills: 0 | DISCHARGE

## 2020-05-28 RX ORDER — POLYETHYLENE GLYCOL 3350 17 G/17G
17 POWDER, FOR SOLUTION ORAL
Qty: 0 | Refills: 0 | DISCHARGE

## 2020-05-28 RX ORDER — ZINC SULFATE TAB 220 MG (50 MG ZINC EQUIVALENT) 220 (50 ZN) MG
1 TAB ORAL
Qty: 0 | Refills: 0 | DISCHARGE

## 2020-05-28 RX ORDER — CHLORHEXIDINE GLUCONATE 213 G/1000ML
15 SOLUTION TOPICAL EVERY 12 HOURS
Refills: 0 | Status: DISCONTINUED | OUTPATIENT
Start: 2020-05-28 | End: 2020-06-01

## 2020-05-28 RX ORDER — SODIUM CHLORIDE 9 MG/ML
1000 INJECTION, SOLUTION INTRAVENOUS
Refills: 0 | Status: DISCONTINUED | OUTPATIENT
Start: 2020-05-28 | End: 2020-05-29

## 2020-05-28 RX ORDER — MONTELUKAST 4 MG/1
1 TABLET, CHEWABLE ORAL
Qty: 0 | Refills: 0 | DISCHARGE

## 2020-05-28 RX ORDER — CHLORHEXIDINE GLUCONATE 213 G/1000ML
15 SOLUTION TOPICAL EVERY 12 HOURS
Refills: 0 | Status: DISCONTINUED | OUTPATIENT
Start: 2020-05-28 | End: 2020-06-05

## 2020-05-28 RX ORDER — CEFEPIME 1 G/1
2000 INJECTION, POWDER, FOR SOLUTION INTRAMUSCULAR; INTRAVENOUS ONCE
Refills: 0 | Status: COMPLETED | OUTPATIENT
Start: 2020-05-28 | End: 2020-05-28

## 2020-05-28 RX ORDER — HEPARIN SODIUM 5000 [USP'U]/ML
5000 INJECTION INTRAVENOUS; SUBCUTANEOUS EVERY 8 HOURS
Refills: 0 | Status: DISCONTINUED | OUTPATIENT
Start: 2020-05-28 | End: 2020-05-31

## 2020-05-28 RX ORDER — SODIUM CHLORIDE 9 MG/ML
1000 INJECTION, SOLUTION INTRAVENOUS ONCE
Refills: 0 | Status: COMPLETED | OUTPATIENT
Start: 2020-05-28 | End: 2020-05-28

## 2020-05-28 RX ORDER — PANTOPRAZOLE SODIUM 20 MG/1
40 TABLET, DELAYED RELEASE ORAL DAILY
Refills: 0 | Status: DISCONTINUED | OUTPATIENT
Start: 2020-05-28 | End: 2020-06-05

## 2020-05-28 RX ORDER — CHLORHEXIDINE GLUCONATE 213 G/1000ML
1 SOLUTION TOPICAL
Refills: 0 | Status: DISCONTINUED | OUTPATIENT
Start: 2020-05-28 | End: 2020-05-29

## 2020-05-28 RX ORDER — ASCORBIC ACID 60 MG
500 TABLET,CHEWABLE ORAL
Qty: 0 | Refills: 0 | DISCHARGE

## 2020-05-28 RX ADMIN — SODIUM CHLORIDE 150 MILLILITER(S): 9 INJECTION, SOLUTION INTRAVENOUS at 23:27

## 2020-05-28 RX ADMIN — Medication 1000 MILLIGRAM(S): at 20:59

## 2020-05-28 RX ADMIN — Medication 650 MILLIGRAM(S): at 19:11

## 2020-05-28 RX ADMIN — CEFEPIME 100 MILLIGRAM(S): 1 INJECTION, POWDER, FOR SOLUTION INTRAMUSCULAR; INTRAVENOUS at 19:12

## 2020-05-28 RX ADMIN — Medication 250 MILLIGRAM(S): at 20:14

## 2020-05-28 RX ADMIN — SODIUM CHLORIDE 1000 MILLILITER(S): 9 INJECTION, SOLUTION INTRAVENOUS at 20:59

## 2020-05-28 RX ADMIN — SODIUM CHLORIDE 1000 MILLILITER(S): 9 INJECTION INTRAMUSCULAR; INTRAVENOUS; SUBCUTANEOUS at 20:38

## 2020-05-28 RX ADMIN — CEFEPIME 2000 MILLIGRAM(S): 1 INJECTION, POWDER, FOR SOLUTION INTRAMUSCULAR; INTRAVENOUS at 20:12

## 2020-05-28 RX ADMIN — SODIUM CHLORIDE 1000 MILLILITER(S): 9 INJECTION INTRAMUSCULAR; INTRAVENOUS; SUBCUTANEOUS at 19:00

## 2020-05-28 RX ADMIN — SODIUM CHLORIDE 1000 MILLILITER(S): 9 INJECTION, SOLUTION INTRAVENOUS at 20:13

## 2020-05-28 RX ADMIN — SODIUM CHLORIDE 1000 MILLILITER(S): 9 INJECTION INTRAMUSCULAR; INTRAVENOUS; SUBCUTANEOUS at 22:39

## 2020-05-28 NOTE — H&P ADULT - PROBLEM SELECTOR PLAN 1
Admitted for septic workup and evaluation ,send blood and urine cx ,serial lactate levels, monitor vitals closely hydration ,monitor urine output and renal profile ,iv abx initiated ,patient  already received Cefepime 2gm iv, vancomycin  1gm iv, rectal Tylenol 650mg, LR x 1 liter, NS x 1 liter in [place]

## 2020-05-28 NOTE — H&P ADULT - ATTENDING COMMENTS
74 Female transferred from  [facility] center to [place] ED with report of sepsis, pneumonia, RYAN, patient on chronic trach collar , recent admission to [facility]  5/4/20 to 5/18/20 for hypoxia, pneumonia, COVID positive 04/27/20 ,developed hypoxia and was placed on respiratory support ,required ICU admission with shock and was placed on pressors  .Treated for UTI and pseudomonal pneumonia ,s/p cefepime ,had elevation of D-dimers .CTT was negative for PE , Doppler neg for DVT .Seen by neurologist for sz -like activity ,neurologist impression was that it was related to hypoxia CT Brain was negative for CVA .COVID  tested negative on 05/11 and 05/12  Patient had been sent to ER for respiratory distress ,associated with hypotension tachycardia ,tachypnea and diaphoresis  .Patient has a hx significant for Advanced dementia  ,Aphasic stroke  ,Constipation  ,COVID-19 virus detected  ,COPD ,Anxiety ,Dysphagia ,hyperkalemia ,pneumonia ,pulmonary edema , CVA (cerebral vascular accident)  ,Dementia of frontal lobe type  ,Diabetes mellitus  ,DM (diabetes mellitus)  ,GERD (gastroesophageal reflux disease)  ,HTN (hypertension)  ,Hypertension  ,Pneumonia  ,Quadriplegia  ,Respiratory failure  ,Feeding by G-tube  ,Gastrostomy in place  ,Hx of appendectomy  ,Tracheostomy in place  ,Tracheostomy tube present ,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 Admitted to ICU ,seen by pulm cons Dr Rojelio Sandoval on admission to [place] ED ( transferred from [facility])and was admitted to ICU  for septic workup and evaluation ,send blood and urine cx, serial lactate levels ,monitor vitals closely hydration ,monitor urine output and renal profile ,iv abx initiated . Palliative care consult requested ,to discuss advance directives and complete MOLST .Patient was diagnosed with sepsis 2/2 to pneumonia and  called ER respiratory and  ICU ,patient  already received Cefepime 2gm iv, vancomycin  1gm iv, rectal Tylenol 650mg, LR x 1 liter, NS x 1 liter in [place] ED .Further plan of care as per pulmonologist Dr Sandoval and icu team

## 2020-05-28 NOTE — H&P ADULT - HISTORY OF PRESENT ILLNESS
74 Female transferred from  [facility] center to [place] ED with report of sepsis, pneumonia, RYAN, patient on chronic trach collar , recent admission to [facility]  5/4/20 to 5/18/20 for hypoxia, pneumonia, COVID positive 04/27/20 ,developed hypoxia and was placed on respiratory support ,required ICU admission with shock and was placed on pressors  .Treated for UTI and pseudomonal pneumonia ,s/p cefepime ,had elevation of D-dimers .CTT was negative for PE , Doppler neg for DVT .Seen by neurologist for sz -like activity ,neurologist impression was that it was related to hypoxia CT Brain was negative for CVA .COVID  tested negative on 05/11 and 05/12  Patient had been sent to ER for respiratory distress ,associated with hypotension tachycardia ,tachypnea and diaphoresis  .Patient has a hx significant for Advanced dementia  ,Aphasic stroke  ,Constipation  ,COVID-19 virus detected  ,COPD ,Anxiety ,Dysphagia ,hyperkalemia ,pneumonia ,pulmonary edema , CVA (cerebral vascular accident)  ,Dementia of frontal lobe type  ,Diabetes mellitus  ,DM (diabetes mellitus)  ,GERD (gastroesophageal reflux disease)  ,HTN (hypertension)  ,Hypertension  ,Pneumonia  ,Quadriplegia  ,Respiratory failure  ,Feeding by G-tube  ,Gastrostomy in place  ,Hx of appendectomy  ,Tracheostomy in place  ,Tracheostomy tube present, 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 Admitted to ICU ,seen by pulm cons Dr Sandoval on admission to [place] ED ( transferred from [facility])and was admitted to ICU  for septic workup and evaluation ,send blood and urine cx, serial lactate levels ,monitor vitals closely hydration ,monitor urine output and renal profile ,iv abx initiated Palliative care consult requested ,to discuss advance directives and complete MOLST .Patient was diagnosed with sepsis 2/2 to pneumonia and  called ER respiratory and  ICU ,patient  already received Cefepime 2gm iv, vancomycin  1gm iv, rectal Tylenol 650mg, LR x 1 liter, NS x 1 liter in [place] ED .Further plan of care as per pulmonologist Dr Sandoval and icu team

## 2020-05-28 NOTE — H&P ADULT - NSICDXPASTMEDICALHX_GEN_ALL_CORE_FT
PAST MEDICAL HISTORY:  Advanced dementia     Aphasic stroke     Constipation     COVID-19 virus detected     CVA (cerebral vascular accident)     Dementia of frontal lobe type     Diabetes mellitus     DM (diabetes mellitus)     GERD (gastroesophageal reflux disease)     HTN (hypertension)     Hypertension     Pneumonia     Quadriplegia     Respiratory failure     Respiratory failure
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
## 2020-05-28 NOTE — CONSULT NOTE ADULT - SUBJECTIVE AND OBJECTIVE BOX
BREA BECKHAM    [facility] ED    Patient is a 74y old  Female who presents with a chief complaint of      Allergies    codeine (Hives)    Intolerances        HPI:      PAST MEDICAL & SURGICAL HISTORY:  Quadriplegia  COVID-19 virus detected  Advanced dementia  DM (diabetes mellitus)  HTN (hypertension)  CVA (cerebral vascular accident)  Respiratory failure  Constipation  GERD (gastroesophageal reflux disease)  Hypertension  Respiratory failure  Diabetes mellitus  Aphasic stroke  Dementia of frontal lobe type  Feeding by G-tube  Tracheostomy tube present  Tracheostomy in place  Gastrostomy in place  Hx of appendectomy      FAMILY HISTORY:  No pertinent family history in first degree relatives        MEDICATIONS   chlorhexidine 0.12% Liquid 15 milliLiter(s) Oral Mucosa every 12 hours  sodium chloride 0.9% Bolus 1000 milliLiter(s) IV Bolus once      Vital Signs Last 24 Hrs  T(C): 38.3 (28 May 2020 18:32), Max: 38.3 (28 May 2020 18:32)  T(F): 100.9 (28 May 2020 18:32), Max: 100.9 (28 May 2020 18:32)  HR: 116 (28 May 2020 20:15) (114 - 139)  BP: 161/67 (28 May 2020 20:15) (161/67 - 164/70)  BP(mean): --  RR: 34 (28 May 2020 20:15) (34 - 36)  SpO2: 100% (28 May 2020 20:15) (91% - 100%)            LABS:                        11.9   21.68 )-----------( 386      ( 28 May 2020 19:02 )             40.1         139  |  98  |  27<H>  ----------------------------<  298<H>  6.0<H>   |  21<L>  |  1.37<H>    Ca    10.4      28 May 2020 19:02    TPro  8.7<H>  /  Alb  3.1<L>  /  TBili  0.5  /  DBili  x   /  AST  38  /  ALT  26  /  AlkPhos  106      PT/INR - ( 28 May 2020 19:03 )   PT: 12.7 sec;   INR: 1.14 ratio         PTT - ( 28 May 2020 19:03 )  PTT:50.6 sec  Urinalysis Basic - ( 28 May 2020 19:40 )    Color: Yellow / Appearance: Turbid / S.010 / pH: x  Gluc: x / Ketone: Negative  / Bili: Negative / Urobili: Negative mg/dL   Blood: x / Protein: 30 mg/dL / Nitrite: Negative   Leuk Esterase: Moderate / RBC: 0-2 /HPF / WBC 3-5   Sq Epi: x / Non Sq Epi: Few / Bacteria: Few            WBC:  WBC Count: 21.68 K/uL ( @ 19:02)      MICROBIOLOGY:  RECENT CULTURES:              PT/INR - ( 28 May 2020 19:03 )   PT: 12.7 sec;   INR: 1.14 ratio         PTT - ( 28 May 2020 19:03 )  PTT:50.6 sec    Sodium:  Sodium, Serum: 139 mmol/L ( @ 19:02)      1.37 mg/dL  @ 19:02      Hemoglobin:  Hemoglobin: 11.9 g/dL ( @ 19:02)      Platelets: Platelet Count - Automated: 386 K/uL ( @ 19:02)      LIVER FUNCTIONS - ( 28 May 2020 19:02 )  Alb: 3.1 g/dL / Pro: 8.7 g/dL / ALK PHOS: 106 U/L / ALT: 26 U/L DA / AST: 38 U/L / GGT: x             Urinalysis Basic - ( 28 May 2020 19:40 )    Color: Yellow / Appearance: Turbid / S.010 / pH: x  Gluc: x / Ketone: Negative  / Bili: Negative / Urobili: Negative mg/dL   Blood: x / Protein: 30 mg/dL / Nitrite: Negative   Leuk Esterase: Moderate / RBC: 0-2 /HPF / WBC 3-5   Sq Epi: x / Non Sq Epi: Few / Bacteria: Few        RADIOLOGY & ADDITIONAL STUDIES:

## 2020-05-28 NOTE — ED ADULT NURSE NOTE - OBJECTIVE STATEMENT
pt transfer from [place]. for R/o covid and sepsis pt vented. pt is awake and responsive to stimulus. pt noted to have trach clean and intact, peg tub clean and intact. vaughn clean and intact. Pt received sitting on stretcher tachypnic over breathing the vent.  PERRLA. Lungs CTA, RR even unlabored. Ab soft non tender, + bowel sounds x 4quads. . Skin warm, clamy color appropriate for age and race. pt transfer from [place]. for R/o covid and sepsis pt vented. pt is awake and responsive to stimulus. pt noted to have trach clean and intact, peg tub clean and intact. vaughn clean and intact. Pt received sitting on stretcher tachypnic over breathing the vent.  PERRLA. Lungs CTA, RR even unlabored. Ab soft non tender, + bowel sounds x 4quads. . Skin warm, clammy color appropriate for age and race.    as per ICU PA 2 liters to be given prior to blood work.

## 2020-05-28 NOTE — ED PROVIDER NOTE - OBJECTIVE STATEMENT
pt is a 74 year old female with a PMHx of respiratory failure with tracheostomy, dementia, CVA, DM, quadriplegia s/p PEG, presents with respiratory distress. Per EMS, pt tachypneic with tachycardia and HTN. Pt unable to give hx at this time. Per , pt with tachypnea when constipated. pt is a 74 year old female with a PMHx of respiratory failure with tracheostomy, dementia, CVA, DM, quadriplegia, dysphagia s/p PEG, presents with respiratory distress. Per EMS, pt tachypneic with tachycardia with associated HTN. pt covid + early may and admitted to h at this time with respiratory failure and pseudomonas pna. Pt unable to give hx at this time. Per , pt with tachypnea when constipated.

## 2020-05-28 NOTE — H&P ADULT - PROBLEM SELECTOR PLAN 4
patient was tested positive on 04/27/20 and negative twice on 05/11 and 05/12 ,ID and pulm cons for co-management

## 2020-05-28 NOTE — H&P ADULT - ASSESSMENT
74 Female transferred from  [facility] center to [place] ED with report of sepsis, pneumonia, RYAN, patient on chronic trach collar , recent admission to [facility]  5/4/20 to 5/18/20 for hypoxia, pneumonia, COVID positive 04/27/20 ,developed hypoxia and was placed on respiratory support ,required ICU admission with shock and was placed on pressors  .Treated for UTI and pseudomonal pneumonia ,s/p cefepime ,had elevation of D-dimers .CTT was negative for PE , Doppler neg for DVT .Seen by neurologist for sz -like activity ,neurologist impression was that it was related to hypoxia CT Brain was negative for CVA .COVID  tested negative on 05/11 and 05/12  Patient had been sent to ER for respiratory distress ,associated with hypotension tachycardia ,tachypnea and diaphoresis  .Patient has a hx significant for Advanced dementia  ,Aphasic stroke  ,Constipation  ,COVID-19 virus detected  ,COPD ,Anxiety ,Dysphagia ,hyperkalemia ,pneumonia ,pulmonary edema , CVA (cerebral vascular accident)  ,Dementia of frontal lobe type  ,Diabetes mellitus  ,DM (diabetes mellitus)  ,GERD (gastroesophageal reflux disease)  ,HTN (hypertension)  ,Hypertension  ,Pneumonia  ,Quadriplegia  ,Respiratory failure  ,Feeding by G-tube  ,Gastrostomy in place  ,Hx of appendectomy  ,Tracheostomy in place  ,Tracheostomy tube present ,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 Admitted to ICU ,seen by pulm cons Dr Sandoval on admission to [place] ED ( transferred from [facility])and was admitted to ICU  for septic workup and evaluation ,send blood and urine cx, serial lactate levels ,monitor vitals closely hydration ,monitor urine output and renal profile ,iv abx initiated Palliative care consult requested ,to discuss advance directives and complete MOLST Patient was diagnosed with sepsis 2/2 to pneumonia and  called ER respiratory and  ICU ,patient  already received Cefepime 2gm iv, vancomycin  1gm iv, rectal Tylenol 650mg, LR x 1 liter, NS x 1 liter in [place] ED .Further plan of care as per pulmonologist Dr Snadoval and icu team

## 2020-05-28 NOTE — ED PROVIDER NOTE - PRINCIPAL DIAGNOSIS
Increase carb ratio to 15.     Continue current basal. Low alert 85 mg/dl, high alert 280.       Sending blood sugars to your provider at [facility]:  We want to help you with your diabetes management, which often requires frequent adjustments to your therapy. For your convenience, we have several ways to send your blood sugars to your doctor for review.    - Send message directly to your doctor through My Chart.  Please ask the rooming staff if you would like to sign up for My Chart.  This is a fast and confidential way to send your information and communicate directly with your provider.   - Record readings and fax to [phone].  We have a template for you to use for your convenience.  - Stop by the clinic with your meter for download if advised by provider.   - My Chart or call Jessica Demarco, Diabetes Educator at [phone]  - Call the clinic and speak to one of the endocrine nurses to relay information on the telephone.  Nandini Herman, or Sabine at [phone].   -    Please call the on-call Endocrinologist at the [facility] for after       hours/weekend needs at [phone] Option #4.    Please note that you do not need to FAST if you are just having an A1C drawn. Please remember to ALWAYS bring your glucose meter with to your appointment. This data is very important for the management of your care.    Thank you!  Your [facility] Care Team         Sepsis

## 2020-05-28 NOTE — ED ADULT NURSE NOTE - OBJECTIVE STATEMENT
Pt BIBA from [facility] nursing facility for respiratory distress - Pt on assisted ventilation via tracheostomy , Pt is awake but nonverbal unable to response  appropriately . Pt has tremors - diaphoretic - with intermittent episodes of generalized spasm of the extremities.

## 2020-05-28 NOTE — ED PROVIDER NOTE - CARE PLAN
Principal Discharge DX:	Sepsis with other acute renal failure  Secondary Diagnosis:	Pneumonia due to infectious organism, unspecified laterality, unspecified part of lung

## 2020-05-28 NOTE — PROVIDER CONTACT NOTE (CRITICAL VALUE NOTIFICATION) - NAME OF MD/NP/PA/DO NOTIFIED:
· Fluids  · Rest  · Over the counter analgesics  · Fu with pcp in 3-5 days if not better or seek medical attention earlier if worsens  · Will start antibiotics due to clinical suspicion   · Will call if strep culture returns positive   · Contagiousness discussed       Kevin

## 2020-05-28 NOTE — ED PROVIDER NOTE - CRITICAL CARE PROVIDED
additional history taking/documentation/telephone consultation with the patient's family/interpretation of diagnostic studies

## 2020-05-28 NOTE — ED ADULT NURSE NOTE - PUPILS PERRL
Gen: +fever, normal appetite  Eyes: No eye irritation or discharge  ENT: +runny nose, No congestion, sore throat  Resp: No cough or trouble breathing  Gastroenteric: +vomiting, no diarrhea, constipation  Skin: No rashes  Remainder as per the HPI
yes

## 2020-05-28 NOTE — ED PROVIDER NOTE - PMH
Advanced dementia    Aphasic stroke    Constipation    COVID-19 virus detected    CVA (cerebral vascular accident)    Dementia of frontal lobe type    Diabetes mellitus    DM (diabetes mellitus)    GERD (gastroesophageal reflux disease)    HTN (hypertension)    Hypertension    Quadriplegia    Respiratory failure    Respiratory failure

## 2020-05-28 NOTE — H&P ADULT - PROBLEM SELECTOR PLAN 2
2/2 to pneumonia ,poa - ID  evaluation,send blood and urine cx,serial lactate levels,monitor vitals roddy raymundo hydration,monitor urine output and renal profile,iv abx initiated

## 2020-05-28 NOTE — ED ADULT NURSE NOTE - PSH
Feeding by G-tube    Gastrostomy in place    Hx of appendectomy    Tracheostomy in place    Tracheostomy tube present

## 2020-05-28 NOTE — ED PROVIDER NOTE - OBJECTIVE STATEMENT
74 female transferred from [facility] with report of sepsis, pneumonia, RYAN, patient on chronic trach collar, from [facility], recent admission to [place] 5/4/20 to 5/18/20 for hypoxia, pneumonia, covid. Patient had been sent to ER for respiratory distress.

## 2020-05-28 NOTE — CONSULT NOTE ADULT - ASSESSMENT
CHAPINCITO GUIDRY  1945 DOA 2020 DR ILIANA TEJEDA           Initial evaluation/Pulmonary Critical Care consultation requested on 2020   by Dr Tejeda   from Dr Sandoval   Patient examined chart reviewed    HOSPITAL ADMISSION   PATIENT CAME  FROM (if information available)      [facility]IN[facility] GUIDRY  1945 DOA 2020 DR ILIANA TEJEDA     REVIEW OF SYMPTOMS      Able to give ROS  NO     PHYSICAL EXAM    HEENT Unremarkable PERRLA atraumatic   RESP Fair air entry EXP prolonged    Harsh breath sound Resp distres mild   CARDIAC S1 S2 No S3     NO JVD    ABDOMEN SOFT BS PRESENT NOT DISTENDED No hepatosplenomegaly PEDAL EDEMA present No calf tenderness  NO rash   GENERAL Not TOXIC looking    OXYGENATION        VITALS/LABS       2020 160/70 114 100f  2020 ac 14/60% +5  2020 w 21 Hb 11 Plt 386   Na 139 K 6 CO2 21 Cr 1.3      PT DATA/BEST PRACTICE  ALLERGY         codeine             WT     63 (2020)                                 BMI     23 (2020)                        CrCl                                  ADVANCED DIRECTIVE     HEAD OF BED ELEVATION Yes      INFECTION PPLX     DVT PROPHYLAXIS                  SQUIRES PROPHYLAXIS          DYSPHAGIA EVAL     DIET                                                                       IV F     2020 LR 2 l once given                                                                 INFECTION  COVID PCR  N-vinnie     W 2020 W 21  ABIO   Vanco 1 dos (2020)   cefepiime 2 g 1 dos (2020)   LA 2020 ALA 13.3   D-DIMR 2020 D-dimr 668   K 2020 K 6   Cr /10- 2020 Cr 1-1.3                                                             PATIENT SUMMARY     74 f PMH aphasic stroke dementia GERD trach vent dependent recently hospitalisd [facility] was admitted 2020 for dyspnea Pulm consulted     home meds psyllium feso4 insulin fentanyl 12 glarigine 20 lvnx 40                                          [facility] ADMISSION -[facility]       PATIENT DATA/ASSESSMENT/RECOMMENDATIONS     MECHANICAL VENT   Monitor abg Target po 90-95% Vent protocol Trach care   Infection ppx  INFECTION  COVID  Follow pcr  Was Neg   FEVER POSSIBLE PNEUMONIA POA 2020   HO recent hospital stay On vent Leukocytosis poa RO asp pneum RO Ventilator associated pneum   Vanco zosyn   LACTICEMIA POA   IV fluids and monitor serially  ELEVATED D-DIMR POA   ELEVATED PTT POA   Was on lovenox pplx at [facility]  Check V duplex  When creat improves will get cta ch   RYAN POA   IV fluids   DM   sl scale                                            TIME SPENT Over 55 minutes aggregate care time spent on encounter; activities included   direct pt care, counseling and/or coordinating care reviewing notes, lab data/ imaging , discussion with multidisciplinary team/ pt /family. Risks, benefits, alternatives  discussed in detail.    CHAPINCITO GUIDRY  1945 DOA 2020 DR ILIANA TEJEDA

## 2020-05-28 NOTE — ED PROVIDER NOTE - CLINICAL SUMMARY MEDICAL DECISION MAKING FREE TEXT BOX
Pt is a 74 year old female with trach, with respiratory distress per EMS, known positive COVID, recently admitted to [facility] for pseudomonas PNA and respiratory failure, will do labs per sepsis work up, EKG, CXR to r/o PNA, IV fluids, Tylenol, consider abx. Pt is a 74 year old female with trach, with respiratory distress per EMS, known positive COVID, recently admitted to [facility] for pseudomonas PNA and respiratory failure presents with fever and tachycardia. will do labs per sepsis work up, EKG, CXR to r/o PNA, IV fluids, Tylenol, abx and transfer to [facility] for admission.

## 2020-05-28 NOTE — CONSULT NOTE ADULT - SUBJECTIVE AND OBJECTIVE BOX
REASON FOR CONSULT: ***    CONSULT REQUESTED BY: ***    Patient is a 74y old  Female who presents with a chief complaint of RESPIRATORY DISTRESS (28 May 2020 23:07)      BRIEF HOSPITAL COURSE: ***    Events last 24 hours: ***    PAST MEDICAL & SURGICAL HISTORY:  Pneumonia  Quadriplegia  COVID-19 virus detected  Advanced dementia  DM (diabetes mellitus)  HTN (hypertension)  CVA (cerebral vascular accident)  Respiratory failure  Constipation  GERD (gastroesophageal reflux disease)  Hypertension  Respiratory failure  Diabetes mellitus  Aphasic stroke  Dementia of frontal lobe type  Feeding by G-tube  Tracheostomy tube present  Tracheostomy in place  Gastrostomy in place  Hx of appendectomy    Allergies    codeine (Hives)    Intolerances      FAMILY HISTORY:  No pertinent family history in first degree relatives      Review of Systems:  CONSTITUTIONAL: No fever, chills, or fatigue  EYES: No eye pain, visual disturbances, or discharge  ENMT:  No difficulty hearing, tinnitus, vertigo; No sinus or throat pain  NECK: No pain or stiffness  RESPIRATORY: No cough, wheezing, chills or hemoptysis; No shortness of breath  CARDIOVASCULAR: No chest pain, palpitations, dizziness, or leg swelling  GASTROINTESTINAL: No abdominal or epigastric pain. No nausea, vomiting, or hematemesis; No diarrhea or constipation. No melena or hematochezia.  GENITOURINARY: No dysuria, frequency, hematuria, or incontinence  NEUROLOGICAL: No headaches, memory loss, loss of strength, numbness, or tremors  SKIN: No itching, burning, rashes, or lesions   MUSCULOSKELETAL: No joint pain or swelling; No muscle, back, or extremity pain  PSYCHIATRIC: No depression, anxiety, mood swings, or difficulty sleeping      Medications:            heparin   Injectable 5000 Unit(s) SubCutaneous every 8 hours    pantoprazole  Injectable 40 milliGRAM(s) IV Push daily        lactated ringers. 1000 milliLiter(s) IV Continuous <Continuous>      chlorhexidine 0.12% Liquid 15 milliLiter(s) Oral Mucosa every 12 hours  chlorhexidine 4% Liquid 1 Application(s) Topical <User Schedule>        Mode: AC/ CMV (Assist Control/ Continuous Mandatory Ventilation)  RR (machine): 14  TV (machine): 420  FiO2: 35  PEEP: 5  ITime: 0.8  MAP: 7  PIP: 17      ICU Vital Signs Last 24 Hrs  T(C): 37.2 (28 May 2020 22:09), Max: 38.3 (28 May 2020 18:32)  T(F): 98.9 (28 May 2020 22:09), Max: 100.9 (28 May 2020 18:32)  HR: 74 (28 May 2020 22:58) (74 - 139)  BP: 138/64 (28 May 2020 22:58) (138/64 - 164/70)  BP(mean): --  ABP: --  ABP(mean): --  RR: 22 (28 May 2020 22:58) (22 - 36)  SpO2: 100% (28 May 2020 22:58) (91% - 100%)    Vital Signs Last 24 Hrs  T(C): 37.2 (28 May 2020 22:09), Max: 38.3 (28 May 2020 18:32)  T(F): 98.9 (28 May 2020 22:09), Max: 100.9 (28 May 2020 18:32)  HR: 74 (28 May 2020 22:58) (74 - 139)  BP: 138/64 (28 May 2020 22:58) (138/64 - 164/70)  BP(mean): --  RR: 22 (28 May 2020 22:58) (22 - 36)  SpO2: 100% (28 May 2020 22:58) (91% - 100%)        I&O's Detail        LABS:                        11.9   21.68 )-----------( 386      ( 28 May 2020 19:02 )             40.1     05    139  |  98  |  27<H>  ----------------------------<  298<H>  6.0<H>   |  21<L>  |  1.37<H>    Ca    10.4      28 May 2020 19:02    TPro  8.7<H>  /  Alb  3.1<L>  /  TBili  0.5  /  DBili  x   /  AST  38  /  ALT  26  /  AlkPhos  106            CAPILLARY BLOOD GLUCOSE        PT/INR - ( 28 May 2020 19:03 )   PT: 12.7 sec;   INR: 1.14 ratio         PTT - ( 28 May 2020 19:03 )  PTT:50.6 sec  Urinalysis Basic - ( 28 May 2020 19:40 )    Color: Yellow / Appearance: Turbid / S.010 / pH: x  Gluc: x / Ketone: Negative  / Bili: Negative / Urobili: Negative mg/dL   Blood: x / Protein: 30 mg/dL / Nitrite: Negative   Leuk Esterase: Moderate / RBC: 0-2 /HPF / WBC 3-5   Sq Epi: x / Non Sq Epi: Few / Bacteria: Few      CULTURES:      Physical Examination:    General: No acute distress.  Alert, oriented, interactive, nonfocal    HEENT: Pupils equal, reactive to light.  Symmetric.    PULM: Clear to auscultation bilaterally, no significant sputum production    CVS: Regular rate and rhythm, no murmurs, rubs, or gallops    ABD: Soft, nondistended, nontender, normoactive bowel sounds, no masses    EXT: No edema, nontender    SKIN: Warm and well perfused, no rashes noted.    RADIOLOGY: ***    CRITICAL CARE TIME SPENT: *** Patient is a 74y old  Female who presents with a chief complaint of RESPIRATORY DISTRESS (28 May 2020 23:07)    History of Present Illness:   74 Female transferred from  [facility] to [place] ED with report of sepsis, pneumonia, RYAN, patient on chronic trach collar , recent admission to [facility]  20 to 20 for hypoxia, pneumonia, COVID positive 20 ,developed hypoxia and was placed on respiratory support ,required ICU admission with shock and was placed on pressors  .Treated for UTI and pseudomonal pneumonia ,s/p cefepime ,had elevation of D-dimers .CTT was negative for PE , Doppler neg for DVT .Seen by neurologist for sz -like activity ,neurologist impression was that it was related to hypoxia CT Brain was negative for CVA .COVID  tested negative on  and   Patient had been sent to ER for respiratory distress ,associated with hypotension tachycardia ,tachypnea and diaphoresis  .Patient has a hx significant for Advanced dementia  ,Aphasic stroke  ,Constipation  ,COVID-19 virus detected  ,COPD ,Anxiety ,Dysphagia ,hyperkalemia ,pneumonia ,pulmonary edema , CVA (cerebral vascular accident)  ,Dementia of frontal lobe type  ,Diabetes mellitus  ,DM (diabetes mellitus)  ,GERD (gastroesophageal reflux disease)  ,HTN (hypertension)  ,Hypertension  ,Pneumonia  ,Quadriplegia  ,Respiratory failure  ,Feeding by G-tube  ,Gastrostomy in place  ,Hx of appendectomy  ,Tracheostomy in place  ,Tracheostomy tube present, 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 Admitted to ICU ,seen by pulm cons Dr Sandoval on admission to [place] ED ( transferred from [facility])and was admitted to ICU  for septic workup and evaluation ,send blood and urine cx, serial lactate levels ,monitor vitals closely hydration ,monitor urine output and renal profile ,iv abx initiated Palliative care consult requested ,to discuss advance directives and complete MOLST .Patient was diagnosed with sepsis 2/2 to pneumonia and  called ER respiratory and  ICU ,patient  already received Cefepime 2gm iv, vancomycin  1gm iv, rectal Tylenol 650mg, LR x 1 liter, NS x 1 liter in [place] ED     Events last 24 hours: ***    PAST MEDICAL & SURGICAL HISTORY:  Pneumonia  Quadriplegia  COVID-19 virus detected  Advanced dementia  DM (diabetes mellitus)  HTN (hypertension)  CVA (cerebral vascular accident)  Respiratory failure  Constipation  GERD (gastroesophageal reflux disease)  Hypertension  Respiratory failure  Diabetes mellitus  Aphasic stroke  Dementia of frontal lobe type  Feeding by G-tube  Tracheostomy tube present  Tracheostomy in place  Gastrostomy in place  Hx of appendectomy    Allergies    codeine (Hives)    Intolerances      FAMILY HISTORY:  No pertinent family history in first degree relatives      Review of Systems:    unable to assess    Medications:    heparin   Injectable 5000 Unit(s) SubCutaneous every 8 hours    pantoprazole  Injectable 40 milliGRAM(s) IV Push daily        lactated ringers. 1000 milliLiter(s) IV Continuous <Continuous>      chlorhexidine 0.12% Liquid 15 milliLiter(s) Oral Mucosa every 12 hours  chlorhexidine 4% Liquid 1 Application(s) Topical <User Schedule>        Mode: AC/ CMV (Assist Control/ Continuous Mandatory Ventilation)  RR (machine): 14  TV (machine): 420  FiO2: 35  PEEP: 5  ITime: 0.8  MAP: 7  PIP: 17      ICU Vital Signs Last 24 Hrs  T(C): 37.2 (28 May 2020 22:09), Max: 38.3 (28 May 2020 18:32)  T(F): 98.9 (28 May 2020 22:09), Max: 100.9 (28 May 2020 18:32)  HR: 74 (28 May 2020 22:58) (74 - 139)  BP: 138/64 (28 May 2020 22:58) (138/64 - 164/70)  BP(mean): --  ABP: --  ABP(mean): --  RR: 22 (28 May 2020 22:58) (22 - 36)  SpO2: 100% (28 May 2020 22:58) (91% - 100%)    Vital Signs Last 24 Hrs  T(C): 37.2 (28 May 2020 22:09), Max: 38.3 (28 May 2020 18:32)  T(F): 98.9 (28 May 2020 22:09), Max: 100.9 (28 May 2020 18:32)  HR: 74 (28 May 2020 22:58) (74 - 139)  BP: 138/64 (28 May 2020 22:58) (138/64 - 164/70)  BP(mean): --  RR: 22 (28 May 2020 22:58) (22 - 36)  SpO2: 100% (28 May 2020 22:58) (91% - 100%)        I&O's Detail        LABS:                        11.9   21.68 )-----------( 386      ( 28 May 2020 19:02 )             40.1         139  |  98  |  27<H>  ----------------------------<  298<H>  6.0<H>   |  21<L>  |  1.37<H>    Ca    10.4      28 May 2020 19:02    TPro  8.7<H>  /  Alb  3.1<L>  /  TBili  0.5  /  DBili  x   /  AST  38  /  ALT  26  /  AlkPhos  106            CAPILLARY BLOOD GLUCOSE        PT/INR - ( 28 May 2020 19:03 )   PT: 12.7 sec;   INR: 1.14 ratio         PTT - ( 28 May 2020 19:03 )  PTT:50.6 sec  Urinalysis Basic - ( 28 May 2020 19:40 )    Color: Yellow / Appearance: Turbid / S.010 / pH: x  Gluc: x / Ketone: Negative  / Bili: Negative / Urobili: Negative mg/dL   Blood: x / Protein: 30 mg/dL / Nitrite: Negative   Leuk Esterase: Moderate / RBC: 0-2 /HPF / WBC 3-5   Sq Epi: x / Non Sq Epi: Few / Bacteria: Few      CULTURES:      Physical Examination:    General:  tachypneic    , lethargic     HEENT: Pupils equal, reactive to light.  Symmetric.    PULM:  diminished geovanny, no wheeze, no rales     CVS: Regular rate and rhythm, no murmurs, rubs, or gallops    ABD: Soft, nondistended, nontender, normoactive bowel sounds, no masses  peg site c/d/i, no rebound tenderness     EXT: No edema, nontender    SKIN:  cool , sacral decub noted     RADIOLOGY: ***    < from: Xray Chest 1 View-PORTABLE IMMEDIATE (20 @ 19:29) >    IMPRESSION:   Mild patchy opacity in both lungs is improved from 2020.                  ANNIE DEJESUS M.D.,ATTENDING RADIOLOGIST  This document has been electronically signed. May 28 2020  9:55PM        < end of copied text >       ECG:   Sinus tachycardia with short AR  Low voltage QRS  Incomplete right bundle branch block  Junctional ST depression, probably normal  Borderline ECG         CRITICAL CARE TIME SPENT: ***

## 2020-05-28 NOTE — ED PROVIDER NOTE - CARE PLAN
Principal Discharge DX:	Sepsis  Secondary Diagnosis:	PNA (pneumonia) Principal Discharge DX:	Sepsis  Secondary Diagnosis:	PNA (pneumonia)  Secondary Diagnosis:	RYAN (acute kidney injury)  Secondary Diagnosis:	Elevated lactic acid level

## 2020-05-28 NOTE — ED ADULT NURSE NOTE - PMH
Advanced dementia    Aphasic stroke    Constipation    COVID-19 virus detected    CVA (cerebral vascular accident)    Dementia of frontal lobe type    Diabetes mellitus    DM (diabetes mellitus)    GERD (gastroesophageal reflux disease)    HTN (hypertension)    Hypertension    Pneumonia    Quadriplegia    Respiratory failure    Respiratory failure

## 2020-05-28 NOTE — ED PROVIDER NOTE - PROGRESS NOTE DETAILS
Deena AS for Dr. Castro: 73 y/o female with a PMHx of frontal lobe dementia, stroke, functional quadriplegia, tracheostomy normally on trach collar, dysphagia s/p PEG, BIB EMS from [facility] to the ED for respiratory distress. Per EMS, pt positive COVID, unknown when. No further hx available. PE: chronically ill appearing, positive respiratory distress, positive tracheotomy in place, pt vented, positive tachypnea, course breath sounds b/l, heart s1, s2, rrr. Abd positive PEG in place, extremities contracted, skin hot and diaphoretic. Deena AS for Dr. Castro: 73 y/o female with a PMHx of frontal lobe dementia, stroke, functional quadriplegia, tracheostomy normally on trach collar, dysphagia s/p PEG, BIB EMS from [facility] to the ED for respiratory distress. Per EMS, pt positive COVID, unknown when. No further hx available. PE: chronically ill appearing, positive respiratory distress, positive tracheotomy in place, pt vented, positive tachypnea, course breath sounds b/l, heart s1, s2, tachycardic. Abd positive PEG in place, extremities contracted, skin hot and diaphoretic. labs and imaging reviewed. will repeat lactate and bmp. consulted dr dinero for admission will admit at [facility]. dr santoyo will accept pt in ed. spoke with pt  who agrees with plan. dr sarah pastor will see pt in ed. dr sarah pastor will see pt in ed.  unable to obtain ekg as pt is not cooperating

## 2020-05-28 NOTE — ED ADULT NURSE NOTE - NSIMPLEMENTINTERV_GEN_ALL_ED
Implemented All Fall Risk Interventions:  [place] to call system. Call bell, personal items and telephone within reach. Instruct patient to call for assistance. Room bathroom lighting operational. Non-slip footwear when patient is off stretcher. Physically safe environment: no spills, clutter or unnecessary equipment. Stretcher in lowest position, wheels locked, appropriate side rails in place. Provide visual cue, wrist band, yellow gown, etc. Monitor gait and stability. Monitor for mental status changes and reorient to person, place, and time. Review medications for side effects contributing to fall risk. Reinforce activity limits and safety measures with patient and family.

## 2020-05-28 NOTE — H&P ADULT - COMMENTS
ROS is unobtainable due to aphasia ,[facility]

## 2020-05-28 NOTE — H&P ADULT - PROBLEM SELECTOR PLAN 9
Palliative care consult requested ,to discuss advance directives and complete gabo COVARRUBIAS/guerita Andrea

## 2020-05-28 NOTE — ED ADULT NURSE NOTE - NSIMPLEMENTINTERV_GEN_ALL_ED
Implemented All Fall with Harm Risk Interventions:  [place] to call system. Call bell, personal items and telephone within reach. Instruct patient to call for assistance. Room bathroom lighting operational. Non-slip footwear when patient is off stretcher. Physically safe environment: no spills, clutter or unnecessary equipment. Stretcher in lowest position, wheels locked, appropriate side rails in place. Provide visual cue, wrist band, yellow gown, etc. Monitor gait and stability. Monitor for mental status changes and reorient to person, place, and time. Review medications for side effects contributing to fall risk. Reinforce activity limits and safety measures with patient and family. Provide visual clues: red socks.

## 2020-05-28 NOTE — CONSULT NOTE ADULT - SUBJECTIVE AND OBJECTIVE BOX
BREA BECKHAM    [place]  ED    Patient is a 74y old  Female who presents with a chief complaint of      Allergies    codeine (Hives)    Intolerances        HPI:      PAST MEDICAL & SURGICAL HISTORY:  Quadriplegia  COVID-19 virus detected  Advanced dementia  DM (diabetes mellitus)  HTN (hypertension)  CVA (cerebral vascular accident)  Respiratory failure  Constipation  GERD (gastroesophageal reflux disease)  Hypertension  Respiratory failure  Diabetes mellitus  Aphasic stroke  Dementia of frontal lobe type  Feeding by G-tube  Tracheostomy tube present  Tracheostomy in place  Gastrostomy in place  Hx of appendectomy      FAMILY HISTORY:  No pertinent family history in first degree relatives        MEDICATIONS   chlorhexidine 0.12% Liquid 15 milliLiter(s) Oral Mucosa every 12 hours      Vital Signs Last 24 Hrs  T(C): 38.3 (28 May 2020 18:32), Max: 38.3 (28 May 2020 18:32)  T(F): 100.9 (28 May 2020 18:32), Max: 100.9 (28 May 2020 18:32)  HR: 116 (28 May 2020 20:15) (114 - 139)  BP: 161/67 (28 May 2020 20:15) (161/67 - 164/70)  BP(mean): --  RR: 34 (28 May 2020 20:15) (34 - 36)  SpO2: 100% (28 May 2020 20:15) (91% - 100%)        Mode: AC/ CMV (Assist Control/ Continuous Mandatory Ventilation), RR (machine): 14, TV (machine): 420, FiO2: 60, PEEP: 5, ITime: 1, MAP: 10, PIP: 34    LABS:                        11.9   21.68 )-----------( 386      ( 28 May 2020 19:02 )             40.1         139  |  98  |  27<H>  ----------------------------<  298<H>  6.0<H>   |  21<L>  |  1.37<H>    Ca    10.4      28 May 2020 19:02    TPro  8.7<H>  /  Alb  3.1<L>  /  TBili  0.5  /  DBili  x   /  AST  38  /  ALT  26  /  AlkPhos  106      PT/INR - ( 28 May 2020 19:03 )   PT: 12.7 sec;   INR: 1.14 ratio         PTT - ( 28 May 2020 19:03 )  PTT:50.6 sec  Urinalysis Basic - ( 28 May 2020 19:40 )    Color: Yellow / Appearance: Turbid / S.010 / pH: x  Gluc: x / Ketone: Negative  / Bili: Negative / Urobili: Negative mg/dL   Blood: x / Protein: 30 mg/dL / Nitrite: Negative   Leuk Esterase: Moderate / RBC: 0-2 /HPF / WBC 3-5   Sq Epi: x / Non Sq Epi: Few / Bacteria: Few            WBC:  WBC Count: 21.68 K/uL ( @ 19:02)      MICROBIOLOGY:  RECENT CULTURES:              PT/INR - ( 28 May 2020 19:03 )   PT: 12.7 sec;   INR: 1.14 ratio         PTT - ( 28 May 2020 19:03 )  PTT:50.6 sec    Sodium:  Sodium, Serum: 139 mmol/L ( @ 19:02)      1.37 mg/dL  @ 19:02      Hemoglobin:  Hemoglobin: 11.9 g/dL ( @ 19:02)      Platelets: Platelet Count - Automated: 386 K/uL ( @ 19:02)      LIVER FUNCTIONS - ( 28 May 2020 19:02 )  Alb: 3.1 g/dL / Pro: 8.7 g/dL / ALK PHOS: 106 U/L / ALT: 26 U/L DA / AST: 38 U/L / GGT: x             Urinalysis Basic - ( 28 May 2020 19:40 )    Color: Yellow / Appearance: Turbid / S.010 / pH: x  Gluc: x / Ketone: Negative  / Bili: Negative / Urobili: Negative mg/dL   Blood: x / Protein: 30 mg/dL / Nitrite: Negative   Leuk Esterase: Moderate / RBC: 0-2 /HPF / WBC 3-5   Sq Epi: x / Non Sq Epi: Few / Bacteria: Few        RADIOLOGY & ADDITIONAL STUDIES:

## 2020-05-28 NOTE — ED PROVIDER NOTE - CLINICAL SUMMARY MEDICAL DECISION MAKING FREE TEXT BOX
transfer from [facility], sepsis, continue iv fluids, repeat lactate, bmp, call respiratory, ICU, already received cefipime 2gm iv, vanco 1gm iv, rectal tylenol 650mg, LR x 1 liter, NS x 1 liter

## 2020-05-29 DIAGNOSIS — J12.81 PNEUMONIA DUE TO SARS-ASSOCIATED CORONAVIRUS: ICD-10-CM

## 2020-05-29 DIAGNOSIS — J96.10 CHRONIC RESPIRATORY FAILURE, UNSPECIFIED WHETHER WITH HYPOXIA OR HYPERCAPNIA: ICD-10-CM

## 2020-05-29 PROBLEM — G82.50 QUADRIPLEGIA, UNSPECIFIED: Chronic | Status: ACTIVE | Noted: 2020-05-04

## 2020-05-29 PROBLEM — E11.9 TYPE 2 DIABETES MELLITUS WITHOUT COMPLICATIONS: Chronic | Status: ACTIVE | Noted: 2020-05-04

## 2020-05-29 PROBLEM — U07.1 COVID-19: Chronic | Status: ACTIVE | Noted: 2020-05-04

## 2020-05-29 PROBLEM — I63.9 CEREBRAL INFARCTION, UNSPECIFIED: Chronic | Status: ACTIVE | Noted: 2020-05-04

## 2020-05-29 PROBLEM — I10 ESSENTIAL (PRIMARY) HYPERTENSION: Chronic | Status: ACTIVE | Noted: 2020-05-04

## 2020-05-29 PROBLEM — F03.90 UNSPECIFIED DEMENTIA WITHOUT BEHAVIORAL DISTURBANCE: Chronic | Status: ACTIVE | Noted: 2020-05-04

## 2020-05-29 LAB
ANION GAP SERPL CALC-SCNC: 4 MMOL/L — LOW (ref 5–17)
ANION GAP SERPL CALC-SCNC: 5 MMOL/L — SIGNIFICANT CHANGE UP (ref 5–17)
BASE EXCESS BLDA CALC-SCNC: -1.3 MMOL/L — SIGNIFICANT CHANGE UP (ref -2–2)
BLOOD GAS COMMENTS ARTERIAL: SIGNIFICANT CHANGE UP
BUN SERPL-MCNC: 20 MG/DL — SIGNIFICANT CHANGE UP (ref 7–23)
BUN SERPL-MCNC: 24 MG/DL — HIGH (ref 7–23)
CALCIUM SERPL-MCNC: 8.5 MG/DL — SIGNIFICANT CHANGE UP (ref 8.5–10.1)
CALCIUM SERPL-MCNC: 8.9 MG/DL — SIGNIFICANT CHANGE UP (ref 8.5–10.1)
CHLORIDE SERPL-SCNC: 109 MMOL/L — HIGH (ref 96–108)
CHLORIDE SERPL-SCNC: 111 MMOL/L — HIGH (ref 96–108)
CO2 SERPL-SCNC: 26 MMOL/L — SIGNIFICANT CHANGE UP (ref 22–31)
CO2 SERPL-SCNC: 27 MMOL/L — SIGNIFICANT CHANGE UP (ref 22–31)
CREAT SERPL-MCNC: 0.77 MG/DL — SIGNIFICANT CHANGE UP (ref 0.5–1.3)
CREAT SERPL-MCNC: 0.83 MG/DL — SIGNIFICANT CHANGE UP (ref 0.5–1.3)
CRP SERPL-MCNC: 5.3 MG/DL — HIGH (ref 0–0.4)
FERRITIN SERPL-MCNC: 266 NG/ML — HIGH (ref 15–150)
GLUCOSE SERPL-MCNC: 170 MG/DL — HIGH (ref 70–99)
GLUCOSE SERPL-MCNC: 171 MG/DL — HIGH (ref 70–99)
GRAM STN FLD: SIGNIFICANT CHANGE UP
HCO3 BLDA-SCNC: 23 MMOL/L — SIGNIFICANT CHANGE UP (ref 23–27)
HCT VFR BLD CALC: 31.7 % — LOW (ref 34.5–45)
HGB BLD-MCNC: 9.6 G/DL — LOW (ref 11.5–15.5)
HOROWITZ INDEX BLDA+IHG-RTO: 21 — SIGNIFICANT CHANGE UP
LACTATE SERPL-SCNC: 2.6 MMOL/L — HIGH (ref 0.7–2)
MAGNESIUM SERPL-MCNC: 2.1 MG/DL — SIGNIFICANT CHANGE UP (ref 1.6–2.6)
MCHC RBC-ENTMCNC: 27.3 PG — SIGNIFICANT CHANGE UP (ref 27–34)
MCHC RBC-ENTMCNC: 30.3 GM/DL — LOW (ref 32–36)
MCV RBC AUTO: 90.1 FL — SIGNIFICANT CHANGE UP (ref 80–100)
NRBC # BLD: 0 /100 WBCS — SIGNIFICANT CHANGE UP (ref 0–0)
PCO2 BLDA: 51 MMHG — HIGH (ref 32–46)
PH BLDA: 7.3 — LOW (ref 7.35–7.45)
PHOSPHATE SERPL-MCNC: 3.5 MG/DL — SIGNIFICANT CHANGE UP (ref 2.5–4.5)
PLATELET # BLD AUTO: 206 K/UL — SIGNIFICANT CHANGE UP (ref 150–400)
PO2 BLDA: 117 MMHG — HIGH (ref 74–108)
POTASSIUM SERPL-MCNC: 4.8 MMOL/L — SIGNIFICANT CHANGE UP (ref 3.5–5.3)
POTASSIUM SERPL-MCNC: 4.8 MMOL/L — SIGNIFICANT CHANGE UP (ref 3.5–5.3)
POTASSIUM SERPL-SCNC: 4.8 MMOL/L — SIGNIFICANT CHANGE UP (ref 3.5–5.3)
POTASSIUM SERPL-SCNC: 4.8 MMOL/L — SIGNIFICANT CHANGE UP (ref 3.5–5.3)
PROCALCITONIN SERPL-MCNC: 2.92 NG/ML — HIGH (ref 0.02–0.1)
RBC # BLD: 3.52 M/UL — LOW (ref 3.8–5.2)
RBC # FLD: 15.6 % — HIGH (ref 10.3–14.5)
SAO2 % BLDA: 98 % — HIGH (ref 92–96)
SODIUM SERPL-SCNC: 140 MMOL/L — SIGNIFICANT CHANGE UP (ref 135–145)
SODIUM SERPL-SCNC: 142 MMOL/L — SIGNIFICANT CHANGE UP (ref 135–145)
SPECIMEN SOURCE: SIGNIFICANT CHANGE UP
WBC # BLD: 8.9 K/UL — SIGNIFICANT CHANGE UP (ref 3.8–10.5)
WBC # FLD AUTO: 8.9 K/UL — SIGNIFICANT CHANGE UP (ref 3.8–10.5)

## 2020-05-29 PROCEDURE — 99233 SBSQ HOSP IP/OBS HIGH 50: CPT | Mod: GC

## 2020-05-29 PROCEDURE — 71045 X-RAY EXAM CHEST 1 VIEW: CPT | Mod: 26

## 2020-05-29 PROCEDURE — 93010 ELECTROCARDIOGRAM REPORT: CPT

## 2020-05-29 PROCEDURE — 70450 CT HEAD/BRAIN W/O DYE: CPT | Mod: 26

## 2020-05-29 RX ORDER — GLUCAGON INJECTION, SOLUTION 0.5 MG/.1ML
1 INJECTION, SOLUTION SUBCUTANEOUS ONCE
Refills: 0 | Status: DISCONTINUED | OUTPATIENT
Start: 2020-05-29 | End: 2020-06-05

## 2020-05-29 RX ORDER — FENTANYL CITRATE 50 UG/ML
50 INJECTION INTRAVENOUS ONCE
Refills: 0 | Status: DISCONTINUED | OUTPATIENT
Start: 2020-05-29 | End: 2020-05-29

## 2020-05-29 RX ORDER — DEXTROSE 50 % IN WATER 50 %
12.5 SYRINGE (ML) INTRAVENOUS ONCE
Refills: 0 | Status: DISCONTINUED | OUTPATIENT
Start: 2020-05-29 | End: 2020-06-05

## 2020-05-29 RX ORDER — INSULIN LISPRO 100/ML
VIAL (ML) SUBCUTANEOUS
Refills: 0 | Status: DISCONTINUED | OUTPATIENT
Start: 2020-05-29 | End: 2020-05-29

## 2020-05-29 RX ORDER — VANCOMYCIN HCL 1 G
1000 VIAL (EA) INTRAVENOUS EVERY 12 HOURS
Refills: 0 | Status: DISCONTINUED | OUTPATIENT
Start: 2020-05-29 | End: 2020-05-29

## 2020-05-29 RX ORDER — DEXTROSE 50 % IN WATER 50 %
25 SYRINGE (ML) INTRAVENOUS ONCE
Refills: 0 | Status: DISCONTINUED | OUTPATIENT
Start: 2020-05-29 | End: 2020-06-05

## 2020-05-29 RX ORDER — VANCOMYCIN HCL 1 G
1000 VIAL (EA) INTRAVENOUS EVERY 12 HOURS
Refills: 0 | Status: DISCONTINUED | OUTPATIENT
Start: 2020-05-29 | End: 2020-05-30

## 2020-05-29 RX ORDER — DEXMEDETOMIDINE HYDROCHLORIDE IN 0.9% SODIUM CHLORIDE 4 UG/ML
0.5 INJECTION INTRAVENOUS
Qty: 200 | Refills: 0 | Status: DISCONTINUED | OUTPATIENT
Start: 2020-05-29 | End: 2020-05-30

## 2020-05-29 RX ORDER — FENTANYL CITRATE 50 UG/ML
1 INJECTION INTRAVENOUS
Refills: 0 | Status: DISCONTINUED | OUTPATIENT
Start: 2020-05-29 | End: 2020-06-04

## 2020-05-29 RX ORDER — DEXTROSE 50 % IN WATER 50 %
15 SYRINGE (ML) INTRAVENOUS ONCE
Refills: 0 | Status: DISCONTINUED | OUTPATIENT
Start: 2020-05-29 | End: 2020-06-05

## 2020-05-29 RX ORDER — CHLORHEXIDINE GLUCONATE 213 G/1000ML
1 SOLUTION TOPICAL DAILY
Refills: 0 | Status: DISCONTINUED | OUTPATIENT
Start: 2020-05-29 | End: 2020-06-05

## 2020-05-29 RX ORDER — PIPERACILLIN AND TAZOBACTAM 4; .5 G/20ML; G/20ML
3.38 INJECTION, POWDER, LYOPHILIZED, FOR SOLUTION INTRAVENOUS EVERY 8 HOURS
Refills: 0 | Status: DISCONTINUED | OUTPATIENT
Start: 2020-05-29 | End: 2020-05-30

## 2020-05-29 RX ORDER — SODIUM CHLORIDE 9 MG/ML
1000 INJECTION, SOLUTION INTRAVENOUS
Refills: 0 | Status: DISCONTINUED | OUTPATIENT
Start: 2020-05-29 | End: 2020-06-05

## 2020-05-29 RX ORDER — INSULIN LISPRO 100/ML
VIAL (ML) SUBCUTANEOUS EVERY 6 HOURS
Refills: 0 | Status: DISCONTINUED | OUTPATIENT
Start: 2020-05-29 | End: 2020-05-31

## 2020-05-29 RX ORDER — PIPERACILLIN AND TAZOBACTAM 4; .5 G/20ML; G/20ML
3.38 INJECTION, POWDER, LYOPHILIZED, FOR SOLUTION INTRAVENOUS ONCE
Refills: 0 | Status: COMPLETED | OUTPATIENT
Start: 2020-05-29 | End: 2020-05-29

## 2020-05-29 RX ADMIN — Medication 1: at 18:31

## 2020-05-29 RX ADMIN — FENTANYL CITRATE 1 PATCH: 50 INJECTION INTRAVENOUS at 20:30

## 2020-05-29 RX ADMIN — Medication 250 MILLIGRAM(S): at 17:21

## 2020-05-29 RX ADMIN — PIPERACILLIN AND TAZOBACTAM 25 GRAM(S): 4; .5 INJECTION, POWDER, LYOPHILIZED, FOR SOLUTION INTRAVENOUS at 05:29

## 2020-05-29 RX ADMIN — PIPERACILLIN AND TAZOBACTAM 200 GRAM(S): 4; .5 INJECTION, POWDER, LYOPHILIZED, FOR SOLUTION INTRAVENOUS at 01:24

## 2020-05-29 RX ADMIN — FENTANYL CITRATE 1 PATCH: 50 INJECTION INTRAVENOUS at 12:43

## 2020-05-29 RX ADMIN — Medication 250 MILLIGRAM(S): at 05:29

## 2020-05-29 RX ADMIN — CHLORHEXIDINE GLUCONATE 15 MILLILITER(S): 213 SOLUTION TOPICAL at 05:29

## 2020-05-29 RX ADMIN — PIPERACILLIN AND TAZOBACTAM 25 GRAM(S): 4; .5 INJECTION, POWDER, LYOPHILIZED, FOR SOLUTION INTRAVENOUS at 08:27

## 2020-05-29 RX ADMIN — HEPARIN SODIUM 5000 UNIT(S): 5000 INJECTION INTRAVENOUS; SUBCUTANEOUS at 22:00

## 2020-05-29 RX ADMIN — CHLORHEXIDINE GLUCONATE 15 MILLILITER(S): 213 SOLUTION TOPICAL at 17:21

## 2020-05-29 RX ADMIN — DEXMEDETOMIDINE HYDROCHLORIDE IN 0.9% SODIUM CHLORIDE 7.94 MICROGRAM(S)/KG/HR: 4 INJECTION INTRAVENOUS at 01:14

## 2020-05-29 RX ADMIN — CHLORHEXIDINE GLUCONATE 1 APPLICATION(S): 213 SOLUTION TOPICAL at 12:42

## 2020-05-29 RX ADMIN — HEPARIN SODIUM 5000 UNIT(S): 5000 INJECTION INTRAVENOUS; SUBCUTANEOUS at 12:44

## 2020-05-29 RX ADMIN — DEXMEDETOMIDINE HYDROCHLORIDE IN 0.9% SODIUM CHLORIDE 7.94 MICROGRAM(S)/KG/HR: 4 INJECTION INTRAVENOUS at 05:33

## 2020-05-29 RX ADMIN — SODIUM CHLORIDE 150 MILLILITER(S): 9 INJECTION, SOLUTION INTRAVENOUS at 01:15

## 2020-05-29 RX ADMIN — SODIUM CHLORIDE 150 MILLILITER(S): 9 INJECTION, SOLUTION INTRAVENOUS at 05:28

## 2020-05-29 RX ADMIN — FENTANYL CITRATE 50 MICROGRAM(S): 50 INJECTION INTRAVENOUS at 01:14

## 2020-05-29 RX ADMIN — HEPARIN SODIUM 5000 UNIT(S): 5000 INJECTION INTRAVENOUS; SUBCUTANEOUS at 05:29

## 2020-05-29 RX ADMIN — PANTOPRAZOLE SODIUM 40 MILLIGRAM(S): 20 TABLET, DELAYED RELEASE ORAL at 12:43

## 2020-05-29 RX ADMIN — PIPERACILLIN AND TAZOBACTAM 25 GRAM(S): 4; .5 INJECTION, POWDER, LYOPHILIZED, FOR SOLUTION INTRAVENOUS at 17:21

## 2020-05-29 NOTE — CONSULT NOTE ADULT - PROBLEM SELECTOR RECOMMENDATION 3
at this point past critical window and PCR negative, will add serology to tomorrow's am labs.    Thank you for consulting us and involving us in the management of this most interesting and challenging case.     We will follow along in the care of this patient.

## 2020-05-29 NOTE — PROGRESS NOTE ADULT - ATTENDING COMMENTS
73 yo woman with frontotemporal dementia, chronic vent dependent respiratory failure, bedbound presenting with respiratory distress, diaphoresis and generalized spasm of extremities.  She was found to be febrile, hypertensive, tachycardic, tachypneic with severe lactic acidosis.  Concern for sz v sepsis.      --will clarify baseline mental status with family  now on precedex, will wean off  ?sz activity, check CTH  neuro eval  --hemodynamically stable  --chronic vent dependent respiratory failure  continue full vent support  --RYAN rapidly improved  lactic acidosis rapidly cleared  --empiric vanc and zosyn pending Cx data  --chronic vaughn  --family update by Dr. Arndt

## 2020-05-29 NOTE — PROGRESS NOTE ADULT - SUBJECTIVE AND OBJECTIVE BOX
BREADIOMEDES BECKHAM    [facility] ICU1 02    Patient is a 74y old  Female who presents with a chief complaint of RESPIRATORY DISTRESS (29 May 2020 09:33)       Allergies    codeine (Hives)    Intolerances        HPI:  74 Female transferred from  [facility] to [facility] [facility] with report of sepsis, pneumonia, RYAN, patient on chronic trach collar , recent admission to [facility]  20 to 20 for hypoxia, pneumonia, COVID positive 20 ,developed hypoxia and was placed on respiratory support ,required ICU admission with shock and was placed on pressors  .Treated for UTI and pseudomonal pneumonia ,s/p cefepime ,had elevation of D-dimers .CTT was negative for PE , Doppler neg for DVT .Seen by neurologist for sz -like activity ,neurologist impression was that it was related to hypoxia CT Brain was negative for CVA .COVID  tested negative on  and   Patient had been sent to ER for respiratory distress ,associated with hypotension tachycardia ,tachypnea and diaphoresis  .Patient has a hx significant for Advanced dementia  ,Aphasic stroke  ,Constipation  ,COVID-19 virus detected  ,COPD ,Anxiety ,Dysphagia ,hyperkalemia ,pneumonia ,pulmonary edema , CVA (cerebral vascular accident)  ,Dementia of frontal lobe type  ,Diabetes mellitus  ,DM (diabetes mellitus)  ,GERD (gastroesophageal reflux disease)  ,HTN (hypertension)  ,Hypertension  ,Pneumonia  ,Quadriplegia  ,Respiratory failure  ,Feeding by G-tube  ,Gastrostomy in place  ,Hx of appendectomy  ,Tracheostomy in place  ,Tracheostomy tube present, 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 Admitted to ICU ,seen by pulm cons Dr Sandoval on admission to [facility] ( transferred from [facility])and was admitted to ICU  for septic workup and evaluation ,send blood and urine cx, serial lactate levels ,monitor vitals closely hydration ,monitor urine output and renal profile ,iv abx initiated Palliative care consult requested ,to discuss advance directives and complete MOLST .Patient was diagnosed with sepsis 2/2 to pneumonia and  called ER respiratory and  ICU ,patient  already received Cefepime 2gm iv, vancomycin  1gm iv, rectal Tylenol 650mg, LR x 1 liter, NS x 1 liter in [facility] .Further plan of care as per pulmonologist Dr Sandoval and icu team (28 May 2020 23:07)      PAST MEDICAL & SURGICAL HISTORY:  Pneumonia  Quadriplegia  COVID-19 virus detected  Advanced dementia  DM (diabetes mellitus)  HTN (hypertension)  CVA (cerebral vascular accident)  Respiratory failure  Constipation  GERD (gastroesophageal reflux disease)  Hypertension  Respiratory failure  Diabetes mellitus  Aphasic stroke  Dementia of frontal lobe type  Feeding by G-tube  Tracheostomy tube present  Tracheostomy in place  Gastrostomy in place  Hx of appendectomy      FAMILY HISTORY:  No pertinent family history in first degree relatives        MEDICATIONS   chlorhexidine 0.12% Liquid 15 milliLiter(s) Oral Mucosa every 12 hours  chlorhexidine 2% Cloths 1 Application(s) Topical daily  dexMEDEtomidine Infusion 0.5 MICROgram(s)/kG/Hr IV Continuous <Continuous>  heparin   Injectable 5000 Unit(s) SubCutaneous every 8 hours  lactated ringers. 1000 milliLiter(s) IV Continuous <Continuous>  pantoprazole  Injectable 40 milliGRAM(s) IV Push daily  piperacillin/tazobactam IVPB.. 3.375 Gram(s) IV Intermittent every 8 hours  vancomycin  IVPB 1000 milliGRAM(s) IV Intermittent every 12 hours      Vital Signs Last 24 Hrs  T(C): 36.3 (29 May 2020 07:30), Max: 38.3 (28 May 2020 18:32)  T(F): 97.4 (29 May 2020 07:30), Max: 100.9 (28 May 2020 18:32)  HR: 55 (29 May 2020 09:00) (51 - 139)  BP: 121/58 (29 May 2020 09:00) (93/52 - 185/86)  BP(mean): 84 (29 May 2020 09:00) (68 - 123)  RR: 15 (29 May 2020 09:00) (15 - 60)  SpO2: 100% (29 May 2020 09:00) (91% - 100%)      20 @ 07:01  -  20 @ 07:00  --------------------------------------------------------  IN: 1554 mL / OUT: 2100 mL / NET: -546 mL        Mode: AC/ CMV (Assist Control/ Continuous Mandatory Ventilation), RR (machine): 14, TV (machine): 420, FiO2: 35, PEEP: 5, ITime: 65, MAP: 11, PIP: 31    LABS:                        9.6    8.90  )-----------( 206      ( 29 May 2020 05:51 )             31.7         142  |  111<H>  |  20  ----------------------------<  171<H>  4.8   |  26  |  0.77    Ca    8.5      29 May 2020 05:51  Phos  3.5       Mg     2.1         TPro  8.7<H>  /  Alb  3.1<L>  /  TBili  0.5  /  DBili  x   /  AST  38  /  ALT  26  /  AlkPhos  106      PT/INR - ( 28 May 2020 19:03 )   PT: 12.7 sec;   INR: 1.14 ratio         PTT - ( 28 May 2020 19:03 )  PTT:50.6 sec  Urinalysis Basic - ( 28 May 2020 19:40 )    Color: Yellow / Appearance: Turbid / S.010 / pH: x  Gluc: x / Ketone: Negative  / Bili: Negative / Urobili: Negative mg/dL   Blood: x / Protein: 30 mg/dL / Nitrite: Negative   Leuk Esterase: Moderate / RBC: 0-2 /HPF / WBC 3-5   Sq Epi: x / Non Sq Epi: Few / Bacteria: Few        ABG - ( 29 May 2020 01:12 )  pH, Arterial: 7.30  pH, Blood: x     /  pCO2: 51    /  pO2: 117   / HCO3: 23    / Base Excess: -1.3  /  SaO2: 98                  WBC:  WBC Count: 8.90 K/uL ( @ 05:51)  WBC Count: 21.68 K/uL ( @ 19:02)      MICROBIOLOGY:  RECENT CULTURES:              PT/INR - ( 28 May 2020 19:03 )   PT: 12.7 sec;   INR: 1.14 ratio         PTT - ( 28 May 2020 19:03 )  PTT:50.6 sec    Sodium:  Sodium, Serum: 142 mmol/L ( @ 05:51)  Sodium, Serum: 140 mmol/L ( @ 23:45)  Sodium, Serum: 139 mmol/L ( @ 19:02)      0.77 mg/dL  @ 05:51  0.83 mg/dL  @ 23:45  1.37 mg/dL  @ 19:02      Hemoglobin:  Hemoglobin: 9.6 g/dL ( @ 05:51)  Hemoglobin: 11.9 g/dL ( @ 19:02)      Platelets: Platelet Count - Automated: 206 K/uL ( @ 05:51)  Platelet Count - Automated: 386 K/uL ( @ 19:02)      LIVER FUNCTIONS - ( 28 May 2020 19:02 )  Alb: 3.1 g/dL / Pro: 8.7 g/dL / ALK PHOS: 106 U/L / ALT: 26 U/L DA / AST: 38 U/L / GGT: x             Urinalysis Basic - ( 28 May 2020 19:40 )    Color: Yellow / Appearance: Turbid / S.010 / pH: x  Gluc: x / Ketone: Negative  / Bili: Negative / Urobili: Negative mg/dL   Blood: x / Protein: 30 mg/dL / Nitrite: Negative   Leuk Esterase: Moderate / RBC: 0-2 /HPF / WBC 3-5   Sq Epi: x / Non Sq Epi: Few / Bacteria: Few        RADIOLOGY & ADDITIONAL STUDIES:

## 2020-05-29 NOTE — PROGRESS NOTE ADULT - SUBJECTIVE AND OBJECTIVE BOX
HPI:    74 year old female with hx. dementia, poor mental status bed bound chronic vaughn. , hx. of covid in april. admitted from skilled nursing facility with    respiratory distress, inc wbc.  In ED cxr shows patchy infiltrates, repeat covid negative, u/a essentially negative. Patient initially had lactate of 13 which dec to 2 with placing patient on vent    hydration. Also with wbc of 21. BP ok, renal function ok  Patient received vanco and Zosyn       PMH:          Pneumonia  Quadriplegia  COVID-19 virus detected  Advanced dementia  DM (diabetes mellitus)  HTN (hypertension)  CVA (cerebral vascular accident)  Respiratory failure  Constipation  GERD (gastroesophageal reflux disease)  Hypertension  Respiratory failure  Diabetes mellitus  Aphasic stroke  Dementia of frontal lobe type  Feeding by G-tube  Tracheostomy tube present  Tracheostomy in place  Gastrostomy in place  Hx of appendectomy         MEDICATIONS  (STANDING):  chlorhexidine 0.12% Liquid 15 milliLiter(s) Oral Mucosa every 12 hours  chlorhexidine 4% Liquid 1 Application(s) Topical <User Schedule>  dexMEDEtomidine Infusion 0.5 MICROgram(s)/kG/Hr (7.94 mL/Hr) IV Continuous <Continuous>  fentaNYL    Injectable 50 MICROGram(s) IV Push once  heparin   Injectable 5000 Unit(s) SubCutaneous every 8 hours  lactated ringers. 1000 milliLiter(s) (150 mL/Hr) IV Continuous <Continuous>  pantoprazole  Injectable 40 milliGRAM(s) IV Push daily  piperacillin/tazobactam IVPB. 3.375 Gram(s) IV Intermittent once  piperacillin/tazobactam IVPB.. 3.375 Gram(s) IV Intermittent every 8 hours  vancomycin  IVPB 1000 milliGRAM(s) IV Intermittent every 12 hours    MEDICATIONS  (PRN):        Height (cm): 165.1 ( @ 21:31), 165.1 ( @ 18:45)  Weight (kg): 63.5 ( @ 21:31), 63.5 ( @ 18:45)  BMI (kg/m2): 23.3 ( @ 21:31), 23.3 ( @ 18:45)    ICU Vital Signs Last 24 Hrs  T(C): 37.2 (28 May 2020 22:09), Max: 38.3 (28 May 2020 18:32)  T(F): 98.9 (28 May 2020 22:09), Max: 100.9 (28 May 2020 18:32)  HR: 84 (29 May 2020 00:31) (74 - 139)  BP: 147/78 (29 May 2020 00:00) (138/64 - 164/70)  BP(mean): --  ABP: --  ABP(mean): --  RR: 24 (29 May 2020 00:00) (22 - 36)  SpO2: 100% (29 May 2020 00:31) (91% - 100%)      Mode: AC/ CMV (Assist Control/ Continuous Mandatory Ventilation)  RR (machine): 14  TV (machine): 420  FiO2: 35  PEEP: 5  ITime: 0.8  MAP: 7  PIP: 18      I&O's Summary                            11.9   21.68 )-----------( 386      ( 28 May 2020 19:02 )             40.1         140  |  109<H>  |  24<H>  ----------------------------<  170<H>  4.8   |  27  |  0.83    Ca    8.9      28 May 2020 23:45    TPro  8.7<H>  /  Alb  3.1<L>  /  TBili  0.5  /  DBili  x   /  AST  38  /  ALT  26  /  AlkPhos  106      Urinalysis Basic - ( 28 May 2020 19:40 )    Color: Yellow / Appearance: Turbid / S.010 / pH: x  Gluc: x / Ketone: Negative  / Bili: Negative / Urobili: Negative mg/dL   Blood: x / Protein: 30 mg/dL / Nitrite: Negative   Leuk Esterase: Moderate / RBC: 0-2 /HPF / WBC 3-5   Sq Epi: x / Non Sq Epi: Few / Bacteria: Few        Ferritin, Serum: 266 ng/mL (20 @ 23:52)  COVID-19 PCR: NotDetec (20 @ 21:58)                                                                         This was a Telehealth visit using A/V capability  Provider was located at [address] [place] [address]

## 2020-05-29 NOTE — DIETITIAN INITIAL EVALUATION ADULT. - OTHER INFO
73yo F with PMH of type 2 DM, quadriplegic, resp failure on chronic vent , htn, peg, CVA, advanced dementia, chronic vaughn, recent admission for +COVID19, d/c to vent facility with neg covid, now with resp distress admitted with sepsis 2/2 PNA.  Unable to speak with pt, chart from previous admissions reviewed.  Weight hx noted to be 125# in Jan. 2019 and more recently 129# May 2020.  Admission wts noted now 140 and 148.9#.  Bedscale weight obtained by this writer and 124# obtained.   Pt previously on Glucerna 1.5 1L /day at SNF.

## 2020-05-29 NOTE — PROGRESS NOTE ADULT - ASSESSMENT
75yo F with PMH of type 2 DM, quadaplegic, resp failure on chronic vent , htn, peg, CVA, advanced dementia, chronic vaughn, recent admission for +COVID19, d/c to vent facility with neg covid, now with resp distress admitted with sepsis 2/2 PNA    Plan-    Neuro: continue sedation with Precedex, history of seizures 2/2 hypoxia - monitor for seizures, likely pt had a seizure given high lactate which resolved appropriately 2/2 IVF, check EEG  Cardio: no active issues, HD stable  Pulm: chronic respiratory failure continue vent support 14/ 420/ 35/ 5, suspected PNA continue abx  ID: continue Zosyn and Vanco for suspected PNA, elevated procalc, f/u blood cx  GI: protonix for GI ppx, keep NPO, TF?  Renal/lytes: no active issues, continue LR IVF   Endo: sliding scale, f/u HbA1C  Heme/Onc: continue heparin for dvt ppx, anemia likely dilutional  Dispo: palliative care consult for MARCI 73yo F with PMH of type 2 DM, quadaplegic, resp failure on chronic vent , htn, peg, CVA, advanced dementia, chronic vaughn, recent admission for +COVID19, d/c to vent facility with neg covid, now with resp distress admitted with sepsis 2/2 PNA    Plan-    Neuro: continue sedation with Precedex, history of seizures 2/2 hypoxia - monitor for seizures, likely pt had a seizure given high lactate which resolved appropriately 2/2 IVF, will consider checking EEG, continue fentanyl patch  Cardio: no active issues, HD stable  Pulm: chronic respiratory failure continue vent support 14/ 420/ 30/ 5, suspected PNA continue abx  ID: continue Zosyn and Vanco for suspected PNA, f/u blood cx, sputum cx, MRSA swab, recheck COVID19 PCR  GI: protonix for GI ppx, start TF  Renal/lytes: no active issues, discontinue LR IVF, lactic acidosis resolving  Endo: sliding scale, HbA1c 7  Heme/Onc: continue heparin for dvt ppx, anemia likely dilutional  Dispo: palliative care consult for MARCI

## 2020-05-29 NOTE — PROGRESS NOTE ADULT - ASSESSMENT
Patient with recent hx. of covid, poor mental status, functional quadriparesis admitted with respiratory distress, likely pneumonia sepsis  Patient placed on ventilator oxygenation ok,  on precedex for sedation  on vanco, zosyn for antibiotics, would culture blood, sputum  hydration lactate has already improved.  sliding scale for hyperglycemia

## 2020-05-29 NOTE — CONSULT NOTE ADULT - PROBLEM SELECTOR RECOMMENDATION 9
chr resp failure  sepsis eval   RYAN  lives in SNF - vent unit  well known to me from [facility]  pt is full code  GOC documented  spoke with  and daughters  vent support  on ABX rx regimen  cx noted  labs reviewed  imaging reviewed

## 2020-05-29 NOTE — CONSULT NOTE ADULT - SUBJECTIVE AND OBJECTIVE BOX
HPI:  74 Female transferred from  [facility] center to [place] ED with report of sepsis, pneumonia, RYAN, patient on chronic trach collar , recent admission to [facility]  20 to 20 for hypoxia, pneumonia, COVID positive 20 ,developed hypoxia and was placed on respiratory support ,required ICU admission with shock and was placed on pressors  .Treated for UTI and pseudomonal pneumonia ,s/p cefepime ,had elevation of D-dimers .CTT was negative for PE , Doppler neg for DVT .Seen by neurologist for sz -like activity ,neurologist impression was that it was related to hypoxia CT Brain was negative for CVA .COVID  tested negative on  and   Patient had been sent to ER for respiratory distress ,associated with hypotension tachycardia ,tachypnea and diaphoresis  .Patient has a hx significant for Advanced dementia  ,Aphasic stroke  ,Constipation  ,COVID-19 virus detected  ,COPD ,Anxiety ,Dysphagia ,hyperkalemia ,pneumonia ,pulmonary edema , CVA (cerebral vascular accident)  ,Dementia of frontal lobe type  ,Diabetes mellitus  ,DM (diabetes mellitus)  ,GERD (gastroesophageal reflux disease)  ,HTN (hypertension)  ,Hypertension  ,Pneumonia  ,Quadriplegia  ,Respiratory failure  ,Feeding by G-tube  ,Gastrostomy in place  ,Hx of appendectomy  ,Tracheostomy in place  ,Tracheostomy tube present, 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 Admitted to ICU ,seen by pulm cons Dr Sandoval on admission to [facility] ( transferred from [facility])and was admitted to ICU  for septic workup and evaluation ,send blood and urine cx, serial lactate levels ,monitor vitals closely hydration ,monitor urine output and renal profile ,iv abx initiated Palliative care consult requested ,to discuss advance directives and complete MOLST .Patient was diagnosed with sepsis 2/2 to pneumonia and  called ER respiratory and  ICU ,patient  already received Cefepime 2gm iv, vancomycin  1gm iv, rectal Tylenol 650mg, LR x 1 liter, NS x 1 liter in [facility] ED .Further plan of care as per pulmonologist Dr Sandoval and icu team (28 May 2020 23:07)      PAST MEDICAL & SURGICAL HISTORY:  Pneumonia  Quadriplegia  COVID-19 virus detected  Advanced dementia  DM (diabetes mellitus)  HTN (hypertension)  CVA (cerebral vascular accident)  Respiratory failure  Constipation  GERD (gastroesophageal reflux disease)  Hypertension  Respiratory failure  Diabetes mellitus  Aphasic stroke  Dementia of frontal lobe type  Feeding by G-tube  Tracheostomy tube present  Tracheostomy in place  Gastrostomy in place  Hx of appendectomy      Antimicrobials  piperacillin/tazobactam IVPB.. 3.375 Gram(s) IV Intermittent every 8 hours  vancomycin  IVPB 1000 milliGRAM(s) IV Intermittent every 12 hours      Immunological      Other  chlorhexidine 0.12% Liquid 15 milliLiter(s) Oral Mucosa every 12 hours  chlorhexidine 2% Cloths 1 Application(s) Topical daily  dexMEDEtomidine Infusion 0.5 MICROgram(s)/kG/Hr IV Continuous <Continuous>  heparin   Injectable 5000 Unit(s) SubCutaneous every 8 hours  lactated ringers. 1000 milliLiter(s) IV Continuous <Continuous>  pantoprazole  Injectable 40 milliGRAM(s) IV Push daily      Allergies    codeine (Hives)    Intolerances        SOCIAL HISTORY:  Social History:  resident in [facility] (28 May 2020 23:07)      FAMILY HISTORY:  No pertinent family history in first degree relatives      ROS:    EYES:  Negative  blurry vision or double vision  GASTROINTESTINAL:  Negative for nausea, vomiting, diarrhea  -otherwise negative except for subjective    Vital Signs Last 24 Hrs  T(C): 36.3 (29 May 2020 07:30), Max: 38.3 (28 May 2020 18:32)  T(F): 97.4 (29 May 2020 07:30), Max: 100.9 (28 May 2020 18:32)  HR: 55 (29 May 2020 09:00) (51 - 139)  BP: 121/58 (29 May 2020 09:00) (93/52 - 185/86)  BP(mean): 84 (29 May 2020 09:00) (68 - 123)  RR: 15 (29 May 2020 09:00) (15 - 60)  SpO2: 100% (29 May 2020 09:00) (91% - 100%)    PE:  WDWN in no distress on Vent  HEENT:  NC, PERRL, sclerae anicteric, conjunctivae clear, EOMI.  Sinuses nontender, no nasal exudate.  No buccal or pharyngeal lesions, erythema or exudate  Neck:  Supple, no adenopathy  Lungs:  No accessory muscle use, bilaterally clear to auscultation, decreased in bases  Cor:  distant  Abd:  Symmetric, normoactive BS.  Soft, tender, tube in place, no masses, guarding or rebound.  Liver and spleen not enlarged  Extrem:  No cyanosis   Skin:  No rashes.  Neuro: wakes up with abd palpation    LABS:                        9.6    8.90  )-----------( 206      ( 29 May 2020 05:51 )             31.7       WBC Count: 8.90 K/uL (20 @ 05:51)  WBC Count: 21.68 K/uL (20 @ 19:02)          142  |  111<H>  |  20  ----------------------------<  171<H>  4.8   |  26  |  0.77    Ca    8.5      29 May 2020 05:51  Phos  3.5       Mg     2.1         TPro  8.7<H>  /  Alb  3.1<L>  /  TBili  0.5  /  DBili  x   /  AST  38  /  ALT  26  /  AlkPhos  106        Creatinine, Serum: 0.77 mg/dL (20 @ 05:51)  Creatinine, Serum: 0.83 mg/dL (20 @ 23:45)  Creatinine, Serum: 1.37 mg/dL (20 @ 19:02)      Urinalysis Basic - ( 28 May 2020 19:40 )    Color: Yellow / Appearance: Turbid / S.010 / pH: x  Gluc: x / Ketone: Negative  / Bili: Negative / Urobili: Negative mg/dL   Blood: x / Protein: 30 mg/dL / Nitrite: Negative   Leuk Esterase: Moderate / RBC: 0-2 /HPF / WBC 3-5   Sq Epi: x / Non Sq Epi: Few / Bacteria: Few      MICROBIOLOGY:      RADIOLOGY & ADDITIONAL STUDIES:    --< from: Xray Chest 1 View- PORTABLE-Routine (20 @ 08:52) >    EXAM:  XR CHEST PORTABLE ROUTINE 1V                            PROCEDURE DATE:  2020          INTERPRETATION:  Follow-up.    AP chest. Prior 2020.    Low lung volumes. Tracheostomy cannula reidentified in position. No change heart mediastinum. No significant change bilateral airspace infiltrates. No significant pleural effusion pneumothorax or other new abnormality.

## 2020-05-29 NOTE — CONSULT NOTE ADULT - ASSESSMENT
74 Female transferred from  [facility] center to [facility] ED with report of sepsis, pneumonia, RYAN, patient on chronic trach collar , recent admission to [facility]  5/4/20 to 5/18/20 for hypoxia, pneumonia, COVID positive 04/27/20 ,adm now 5/28 with septic picture     5/20-Mode: AC/ CMV (Assist Control/ Continuous Mandatory Ventilation), RR (machine): 14, TV (machine): 420, FiO2: 35, PEEP: 5-on ZOSYN/VANCO
[facility]  1945 DOA 2020 DR ILIANA TEJEDA           Initial evaluation/Pulmonary Critical Care consultation requested on 2020   by Dr Tejeda   from Dr Sandoval   Patient examined chart reviewed    HOSPITAL ADMISSION   PATIENT CAME  FROM (if information available)      [facility]  1945 DOA 2020 DR ILIANA TEJEDA     REVIEW OF SYMPTOMS      Able to give ROS  NO     PHYSICAL EXAM    HEENT Unremarkable PERRLA atraumatic   RESP Fair air entry EXP prolonged    Harsh breath sound Resp distres mild   CARDIAC S1 S2 No S3     NO JVD    ABDOMEN SOFT BS PRESENT NOT DISTENDED No hepatosplenomegaly PEDAL EDEMA present No calf tenderness  NO rash   GENERAL Not TOXIC looking    OXYGENATION        VITALS/LABS       2020 160/70 114 100f  2020 ac 14/60% +5  2020 w 21 Hb 11 Plt 386   Na 139 K 6 CO2 21 Cr 1.3      PT DATA/BEST PRACTICE  ALLERGY         codeine             WT     63 (2020)                                 BMI     23 (2020)                        CrCl                                  ADVANCED DIRECTIVE     HEAD OF BED ELEVATION Yes      INFECTION PPLX     DVT PROPHYLAXIS                  SQUIRES PROPHYLAXIS          DYSPHAGIA EVAL     DIET                                                                       IV F     2020 LR 2 l once given                                                                 INFECTION  COVID PCR  N-vinnie     W 2020 W 21  ABIO   Vanco 1 dos (2020)   cefepiime 2 g 1 dos (2020)   LA 2020 ALA 13.3   D-DIMR 2020 D-dimr 668   K 2020 K 6   Cr /10- 2020 Cr 1-1.3                                                             PATIENT SUMMARY     74 f PMH aphasic stroke dementia GERD trach vent dependent recently hospitalisd [facility] was admitted 2020 for dyspnea Pulm consulted     home meds psyllium feso4 insulin fentanyl 12 glarigine 20 lvnx 40                                          PAST ADMISSION -[facility]       PATIENT DATA/ASSESSMENT/RECOMMENDATIONS     MECHANICAL VENT   Monitor abg Target po 90-95% Vent protocol Trach care   Infection ppx  INFECTION  COVID  Follow pcr  Was Neg   FEVER POSSIBLE PNEUMONIA POA 2020   HO recent hospital stay On vent Leukocytosis poa RO asp pneum RO Ventilator associated pneum   Vanco zosyn   LACTICEMIA POA   IV fluids and monitor serially  ELEVATED D-DIMR POA   ELEVATED PTT POA   Was on lovenox pplx at [facility]  Check V duplex  When creat improves will get cta ch   RYAN POA   IV fluids   DM   sl scale                                            TIME SPENT Over 55 minutes aggregate care time spent on encounter; activities included   direct pt care, counseling and/or coordinating care reviewing notes, lab data/ imaging , discussion with multidisciplinary team/ pt /family. Risks, benefits, alternatives  discussed in detail.    [facility]  1945 DOA 2020 DR ILIANA TEJEDA
74 yr female hx of dm, quadaplegic,  resp failure on chronic vent , htn, peg, CVA, advanced dementia, recent admission for +++COVID , d/c to vent facility with neg covid, now with resp distress, profound , most likely PNA, sepsis , requiring broad spectrum ABX, ICU admission     Cont vent support  broad spectrum ABX, ID consult in am   Serial ABG , monitor lactate   Aggressive IV resuscitation,   follow up on blood culture    GI / DVT prophylaxis.   keep K>4, mag >2.0   Doppler l e , pending, +/- if positive would persue PE study.  start Pressor for renal perfusion if needed.    will hold on lantus at present, Insulin sliding scale at present   NPO at present    d/w EICU team.

## 2020-05-29 NOTE — CONSULT NOTE ADULT - SUBJECTIVE AND OBJECTIVE BOX
Date/Time Patient Seen:  		  Referring MD:   Data Reviewed	       Patient is a 74y old  Female who presents with a chief complaint of RESPIRATORY DISTRESS (29 May 2020 11:00)      Subjective/HPI    in bed  seen and examined  vs and meds reviewed  labs reviewed  H and P reviewed  ER provider note reviewed    74 female transferred from [facility] with report of sepsis, pneumonia, RYAN, patient on chronic trach collar, from [facility], recent admission to [place] 5/4/20 to 5/18/20 for hypoxia, pneumonia, covid. Patient had been sent to ER for respiratory distress.    History and Physical:   Source of Information	Chart(s), Physician/Provider, Other Healthcare Facility  Outpatient Providers	SOLANGE RODRIGUEZ     Language:  · Patient/Family of Limited English Proficiency	No     Patient Identity:  · Birth Sex	Female       History of Present Illness:  Reason for Admission: RESPIRATORY DISTRESS  History of Present Illness:   74 Female transferred from  [facility] to [place] ED with report of sepsis, pneumonia, RYAN, patient on chronic trach collar , recent admission to [facility]  5/4/20 to 5/18/20 for hypoxia, pneumonia, COVID positive 04/27/20 ,developed hypoxia and was placed on respiratory support ,required ICU admission with shock and was placed on pressors  .Treated for UTI and pseudomonal pneumonia ,s/p cefepime ,had elevation of D-dimers .CTT was negative for PE , Doppler neg for DVT .Seen by neurologist for sz -like activity ,neurologist impression was that it was related to hypoxia CT Brain was negative for CVA .COVID  tested negative on 05/11 and 05/12  Patient had been sent to ER for respiratory distress ,associated with hypotension tachycardia ,tachypnea and diaphoresis  .Patient has a hx significant for Advanced dementia  ,Aphasic stroke  ,Constipation  ,COVID-19 virus detected  ,COPD ,Anxiety ,Dysphagia ,hyperkalemia ,pneumonia ,pulmonary edema , CVA (cerebral vascular accident)  ,Dementia of frontal lobe type  ,Diabetes mellitus  ,DM (diabetes mellitus)  ,GERD (gastroesophageal reflux disease)  ,HTN (hypertension)  ,Hypertension  ,Pneumonia  ,Quadriplegia  ,Respiratory failure  ,Feeding by G-tube  ,Gastrostomy in place  ,Hx of appendectomy  ,Tracheostomy in place  ,Tracheostomy tube present, 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 Admitted to ICU ,seen by pulm cons Dr Sandoval on admission to [place] ED ( transferred from [facility])and was admitted to ICU  for septic workup and evaluation ,send blood and urine cx, serial lactate levels ,monitor vitals closely hydration ,monitor urine output and renal profile ,iv abx initiated Palliative care consult requested ,to discuss advance directives and complete MOLST .Patient was diagnosed with sepsis 2/2 to pneumonia and  called ER respiratory and  ICU ,patient  already received Cefepime 2gm iv, vancomycin  1gm iv, rectal Tylenol 650mg, LR x 1 liter, NS x 1 liter in [place] ED .Further plan of care as per pulmonologist Dr Sandoval and icu team     Palliative Care Referral  Spoke with pts  Marciano. He is her legal guardian. He states she is full code at this time. She is the center of their family he and his dtr know her well.       PAST MEDICAL & SURGICAL HISTORY:  Pneumonia  Quadriplegia  COVID-19 virus detected  Advanced dementia  DM (diabetes mellitus)  HTN (hypertension)  CVA (cerebral vascular accident)  Respiratory failure  Constipation  GERD (gastroesophageal reflux disease)  Hypertension  Respiratory failure  Diabetes mellitus  Aphasic stroke  Dementia of frontal lobe type  Feeding by G-tube  Tracheostomy tube present  Tracheostomy in place  Gastrostomy in place  Hx of appendectomy        Medication list         MEDICATIONS  (STANDING):  chlorhexidine 0.12% Liquid 15 milliLiter(s) Oral Mucosa every 12 hours  chlorhexidine 2% Cloths 1 Application(s) Topical daily  dexMEDEtomidine Infusion 0.5 MICROgram(s)/kG/Hr (7.94 mL/Hr) IV Continuous <Continuous>  dextrose 5%. 1000 milliLiter(s) (50 mL/Hr) IV Continuous <Continuous>  dextrose 50% Injectable 12.5 Gram(s) IV Push once  dextrose 50% Injectable 25 Gram(s) IV Push once  dextrose 50% Injectable 25 Gram(s) IV Push once  fentaNYL   Patch  12 MICROgram(s)/Hr 1 Patch Transdermal every 72 hours  heparin   Injectable 5000 Unit(s) SubCutaneous every 8 hours  insulin lispro (HumaLOG) corrective regimen sliding scale   SubCutaneous three times a day before meals  pantoprazole  Injectable 40 milliGRAM(s) IV Push daily  piperacillin/tazobactam IVPB.. 3.375 Gram(s) IV Intermittent every 8 hours  vancomycin  IVPB 1000 milliGRAM(s) IV Intermittent every 12 hours    MEDICATIONS  (PRN):  dextrose 40% Gel 15 Gram(s) Oral once PRN Blood Glucose LESS THAN 70 milliGRAM(s)/deciliter  glucagon  Injectable 1 milliGRAM(s) IntraMuscular once PRN Glucose LESS THAN 70 milligrams/deciliter         Vitals log        ICU Vital Signs Last 24 Hrs  T(C): 37.2 (29 May 2020 15:35), Max: 37.2 (28 May 2020 21:34)  T(F): 99 (29 May 2020 15:35), Max: 99 (29 May 2020 15:35)  HR: 77 (29 May 2020 19:41) (51 - 137)  BP: 101/57 (29 May 2020 18:00) (90/51 - 185/86)  BP(mean): 77 (29 May 2020 18:00) (66 - 123)  ABP: --  ABP(mean): --  RR: 26 (29 May 2020 19:41) (14 - 60)  SpO2: 100% (29 May 2020 19:41) (100% - 100%)       Mode: AC/ CMV (Assist Control/ Continuous Mandatory Ventilation)  RR (machine): 14  TV (machine): 420  FiO2: 30  PEEP: 5  ITime: 1  MAP: 13  PIP: 32      Input and Output:  I&O's Detail    28 May 2020 07:01  -  29 May 2020 07:00  --------------------------------------------------------  IN:    dexmedetomidine Infusion: 54 mL    lactated ringers.: 1050 mL    Solution: 200 mL    Solution: 250 mL  Total IN: 1554 mL    OUT:    Indwelling Catheter - Urethral: 2100 mL  Total OUT: 2100 mL    Total NET: -546 mL      29 May 2020 07:01  -  29 May 2020 19:55  --------------------------------------------------------  IN:    dexmedetomidine Infusion: 25 mL    lactated ringers.: 750 mL    Solution: 200 mL    Solution: 250 mL  Total IN: 1225 mL    OUT:    Indwelling Catheter - Urethral: 450 mL  Total OUT: 450 mL    Total NET: 775 mL          Lab Data                        9.6    8.90  )-----------( 206      ( 29 May 2020 05:51 )             31.7     05-29    142  |  111<H>  |  20  ----------------------------<  171<H>  4.8   |  26  |  0.77    Ca    8.5      29 May 2020 05:51  Phos  3.5     05-29  Mg     2.1     05-29    TPro  8.7<H>  /  Alb  3.1<L>  /  TBili  0.5  /  DBili  x   /  AST  38  /  ALT  26  /  AlkPhos  106  05-28    ABG - ( 29 May 2020 01:12 )  pH, Arterial: 7.30  pH, Blood: x     /  pCO2: 51    /  pO2: 117   / HCO3: 23    / Base Excess: -1.3  /  SaO2: 98                      Review of Systems	  ventilated      Objective     Physical Examination    heart s1s2  lung dec BS  abd soft  head nc  head at      Pertinent Lab findings & Imaging      Annalise:  NO   Adequate UO     I&O's Detail    28 May 2020 07:01  -  29 May 2020 07:00  --------------------------------------------------------  IN:    dexmedetomidine Infusion: 54 mL    lactated ringers.: 1050 mL    Solution: 200 mL    Solution: 250 mL  Total IN: 1554 mL    OUT:    Indwelling Catheter - Urethral: 2100 mL  Total OUT: 2100 mL    Total NET: -546 mL      29 May 2020 07:01  -  29 May 2020 19:55  --------------------------------------------------------  IN:    dexmedetomidine Infusion: 25 mL    lactated ringers.: 750 mL    Solution: 200 mL    Solution: 250 mL  Total IN: 1225 mL    OUT:    Indwelling Catheter - Urethral: 450 mL  Total OUT: 450 mL    Total NET: 775 mL               Discussed with:     Cultures:	        Radiology      EXAM:  XR CHEST PORTABLE ROUTINE 1V                            PROCEDURE DATE:  05/29/2020          INTERPRETATION:  Follow-up.    AP chest. Prior 5/28/2020.    Low lung volumes. Tracheostomy cannula reidentified in position. No change heart mediastinum. No significant change bilateral airspace infiltrates. No significant pleural effusion pneumothorax or other new abnormality.    Impression: Essentially stable exam.                NA LIMA M.D., ATTENDING RADIOLOGIST  This document has been electronically signed. May 29 2020  8:54AM

## 2020-05-29 NOTE — DIETITIAN INITIAL EVALUATION ADULT. - PROBLEM SELECTOR PLAN 1
Admitted for septic workup and evaluation ,send blood and urine cx ,serial lactate levels, monitor vitals closely hydration ,monitor urine output and renal profile ,iv abx initiated ,patient  already received Cefepime 2gm iv, vancomycin  1gm iv, rectal Tylenol 650mg, LR x 1 liter, NS x 1 liter in [place]

## 2020-05-29 NOTE — PROGRESS NOTE ADULT - ASSESSMENT
cont rx [facility] GUIDRY  1945 DOA 2020 DR ILIANA KEMP     REVIEW OF SYMPTOMS      Able to give ROS  NO     PHYSICAL EXAM    HEENT Unremarkable PERRLA atraumatic   RESP Fair air entry EXP prolonged    Harsh breath sound Resp distres mild   CARDIAC S1 S2 No S3     NO JVD    ABDOMEN SOFT BS PRESENT NOT DISTENDED No hepatosplenomegaly PEDAL EDEMA present No calf tenderness  NO rash   GENERAL Not TOXIC looking    OXYGENATION        VITALS/LABS       2020 afeb 53 98/50   2020 8a dexm .2 m/k/h   2020 u 2100   2020 7a ac 14/420/5/.35   2020 w 8.9 Hb 9.5      PT DATA/BEST PRACTICE  ALLERGY         codeine             WT     63 (2020)                                 BMI     23 (2020)                        CrCl                                  ADVANCED DIRECTIVE     HEAD OF BED ELEVATION Yes      INFECTION PPLX     Chlorhexidine 2% ()   Chlorhexidine .12% ()   DVT PROPHYLAXIS   hpsc ()                SQUIRES PROPHYLAXIS    Protonix 40 ()       DYSPHAGIA EVAL     DIET     npo ()                                                                   IV F    ()   ABIO   zosyn ()   Vanco 1.2 ()     COVID PCR  N-vinnie   COVID PCR  N-vinnie     W -2020 W 21-8.9   PROC 2020 PC 2.9  CXR  BL infiltr    LA -2020 LA 10.5-2.6     D-DIMR 2020 D-dimr 668    Hb - Hb 11-9.5     K -2020 K 6-4.6   Cr 5/10- -2020 Cr 1-1.3-.7     ABG   2020 1a ac 14/420/35%/5 730/51/117                               PATIENT SUMMARY     74 f PMH aphasic stroke functional quad dementia GERD trach vent dependent recently hospitalisd [place] [facility] was admitted 2020 for dyspnea possible sepsis Pulm consulted     home meds psyllium feso4 insulin fentanyl 12 glarigine 20 lvnx 40                                          PAST ADMISSION -[facility]       PATIENT DATA/ASSESSMENT/RECOMMENDATIONS     RESP DISTRESS POA   Likely sec to Ventilator associated pneumonia (WBC Infiltrates)  Improved    MECHANICAL VENT   Monitor abg Target po 90-95% Vent protocol Trach care   Infection ppx  Gas exchange   showed resp acidosis     INFECTION  COVID  PCR N-VINNIE    FEVER POSSIBLE PNEUMONIA POA 2020   HO recent hospital stay   On vent Leukocytosis poa RO asp pneum RO Ventilator associated pneum   On Vanco zosyn   Follow cultures         PATIENT DATA/ASSESSMENT/RECOMMENDATIONS     RESP DISTRESS POA   Likely sec to Ventilator associated pneumonia (WBC Infiltrates)  Improved    MECHANICAL VENT   Monitor abg Target po 90-95% Vent protocol Trach care   Infection ppx  Gas exchange   showed resp acidosis     INFECTION  COVID  PCR N-VINNIE    FEVER POSSIBLE PNEUMONIA POA 2020   HO recent hospital stay   On vent Leukocytosis poa RO asp pneum RO Ventilator associated pneum   On Vanco zosyn   Follow cultures       LACTICEMIA POA   Cause of LA may have been unwitnessed sz or sepsis   IV fluids and monitor serially  Improved     ELEVATED D-DIMR POA   ELEVATED PTT POA   Was on lovenox pplx at [facility]  2020 Suggest Check  V duplex  W  2020 Check  cta ch     RYAN POA   IV fluids   Improved    DM   sl scale     PLAN   Cont Rx of VAP Await cultures  Suggest ritchie of sl elevated D-dimer eg cta v duplx Monitor abg po                                            TIME SPENT Over 25 minutes aggregate care time spent on encounter; activities included   direct pt care, counseling and/or coordinating care reviewing notes, lab data/ imaging , discussion with multidisciplinary team/ pt /family. Risks, benefits, alternatives  discussed in detail.    CHAPINCITO GUIDRY  1945 DOA 2020 DR ILIANA KEMP

## 2020-05-29 NOTE — GOALS OF CARE CONVERSATION - ADVANCED CARE PLANNING - CONVERSATION DETAILS
Spoke with pts  Marciano. He is her legal guardian. He states she is full code at this time. She is the center of their family he and his dtr know her well.

## 2020-05-29 NOTE — PROGRESS NOTE ADULT - ATTENDING COMMENTS
74 Female transferred from  [facility] center to [place] ED with report of sepsis, pneumonia, RYAN, patient on chronic trach collar , recent admission to [facility]  5/4/20 to 5/18/20 for hypoxia, pneumonia, COVID positive 04/27/20 ,developed hypoxia and was placed on respiratory support ,required ICU admission with shock and was placed on pressors  .Treated for UTI and pseudomonal pneumonia ,s/p cefepime ,had elevation of D-dimers .CTT was negative for PE , Doppler neg for DVT .Seen by neurologist for sz -like activity ,neurologist impression was that it was related to hypoxia CT Brain was negative for CVA .COVID  tested negative on 05/11 and 05/12  Patient had been sent to ER for respiratory distress ,associated with hypotension tachycardia ,tachypnea and diaphoresis  .Patient has a hx significant for Advanced dementia  ,Aphasic stroke  ,Constipation  ,COVID-19 virus detected  ,COPD ,Anxiety ,Dysphagia ,hyperkalemia ,pneumonia ,pulmonary edema , CVA (cerebral vascular accident)  ,Dementia of frontal lobe type  ,Diabetes mellitus  ,DM (diabetes mellitus)  ,GERD (gastroesophageal reflux disease)  ,HTN (hypertension)  ,Hypertension  ,Pneumonia  ,Quadriplegia  ,Respiratory failure  ,Feeding by G-tube  ,Gastrostomy in place  ,Hx of appendectomy  ,Tracheostomy in place  ,Tracheostomy tube present ,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 Admitted to ICU ,seen by pulm cons Dr Rojelio Sandoval on admission to [place] ED ( transferred from [facility])and was admitted to ICU  for septic workup and evaluation ,send blood and urine cx, serial lactate levels ,monitor vitals closely hydration ,monitor urine output and renal profile ,iv abx initiated . Palliative care consult requested ,to discuss advance directives and complete MOLST .Patient was diagnosed with sepsis 2/2 to pneumonia and  called ER respiratory and  ICU ,patient  already received Cefepime 2gm iv, vancomycin  1gm iv, rectal Tylenol 650mg, LR x 1 liter, NS x 1 liter in [place] ED .Further plan of care as per pulmonologist Dr Sandoval and icu team

## 2020-05-29 NOTE — PROGRESS NOTE ADULT - SUBJECTIVE AND OBJECTIVE BOX
Patient is a 74y old  Female who presents with a chief complaint of RESPIRATORY DISTRESS (29 May 2020 00:56)    24 hour events: no acute overnight events    T(F): 97.4 (20 @ 07:30), Max: 100.9 (20 @ 18:32)  HR: 57 (20 @ 07:43) (51 - 139)  BP: 108/53 (20 @ 07:30) (93/52 - 185/86)  RR: 25 (20 @ 07:30) (17 - 60)  SpO2: 100% (20 @ 07:43) (91% - 100%)  Wt(kg): --    Mode: AC/ CMV (Assist Control/ Continuous Mandatory Ventilation), RR (machine): 14, TV (machine): 420, FiO2: 35, PEEP: 5        I&O's Summary     @ 07:01  -   @ 07:00  --------------------------------------------------------  IN: 1554 mL / OUT: 2100 mL / NET: -546 mL      PHYSICAL EXAM  General: NAD  CNS: sedated  HEENT: +trach  Resp: DC BS b/l  CVS: RRR  Abd: soft, NT/ND  Ext: no edema  Skin: warm well perfused    MEDICATIONS  piperacillin/tazobactam IVPB.. IV Intermittent  vancomycin  IVPB IV Intermittent          dexMEDEtomidine Infusion IV Continuous      heparin   Injectable SubCutaneous    pantoprazole  Injectable IV Push      lactated ringers. IV Continuous      chlorhexidine 0.12% Liquid Oral Mucosa  chlorhexidine 2% Cloths Topical                            9.6    8.90  )-----------( 206      ( 29 May 2020 05:51 )             31.7           142  |  111<H>  |  20  ----------------------------<  171<H>  4.8   |  26  |  0.77    Ca    8.5      29 May 2020 05:51  Phos  3.5       Mg     2.1         TPro  8.7<H>  /  Alb  3.1<L>  /  TBili  0.5  /  DBili  x   /  AST  38  /  ALT  26  /  AlkPhos  106      Lactate 2.6            @ 23:45    Lactate 10.5            @ 20:35    Lactate 13.3            @ 19:02          PT/INR - ( 28 May 2020 19:03 )   PT: 12.7 sec;   INR: 1.14 ratio         PTT - ( 28 May 2020 19:03 )  PTT:50.6 sec  Urinalysis Basic - ( 28 May 2020 19:40 )    Color: Yellow / Appearance: Turbid / S.010 / pH: x  Gluc: x / Ketone: Negative  / Bili: Negative / Urobili: Negative mg/dL   Blood: x / Protein: 30 mg/dL / Nitrite: Negative   Leuk Esterase: Moderate / RBC: 0-2 /HPF / WBC 3-5   Sq Epi: x / Non Sq Epi: Few / Bacteria: Few                REID: Y chronic                          GLOBAL ISSUE/BEST PRACTICE  Analgesia: N  Sedation: Y  CAM-ICU: Neg  HOB elevation: yes  Stress ulcer prophylaxis: Y  VTE prophylaxis: Y  Glycemic control: N  Nutrition: N    CODE STATUS: Patient is a 74y old  Female who presents with a chief complaint of RESPIRATORY DISTRESS (29 May 2020 00:56)    24 hour events: no acute overnight events    T(F): 97.4 (20 @ 07:30), Max: 100.9 (20 @ 18:32)  HR: 57 (20 @ 07:43) (51 - 139)  BP: 108/53 (20 @ 07:30) (93/52 - 185/86)  RR: 25 (20 @ 07:30) (17 - 60)  SpO2: 100% (20 @ 07:43) (91% - 100%)  Wt(kg): --    Mode: AC/ CMV (Assist Control/ Continuous Mandatory Ventilation), RR (machine): 14, TV (machine): 420, FiO2: 35, PEEP: 5        I&O's Summary     @ 07:01  -   @ 07:00  --------------------------------------------------------  IN: 1554 mL / OUT: 2100 mL / NET: -546 mL      PHYSICAL EXAM  General: NAD  CNS: sedated  HEENT: +trach  Resp: DC BS b/l  CVS: RRR  Abd: soft, NT/ND  Ext: no edema  Skin: warm well perfused    MEDICATIONS  piperacillin/tazobactam IVPB.. IV Intermittent  vancomycin  IVPB IV Intermittent          dexMEDEtomidine Infusion IV Continuous      heparin   Injectable SubCutaneous    pantoprazole  Injectable IV Push      lactated ringers. IV Continuous      chlorhexidine 0.12% Liquid Oral Mucosa  chlorhexidine 2% Cloths Topical                            9.6    8.90  )-----------( 206      ( 29 May 2020 05:51 )             31.7           142  |  111<H>  |  20  ----------------------------<  171<H>  4.8   |  26  |  0.77    Ca    8.5      29 May 2020 05:51  Phos  3.5       Mg     2.1         TPro  8.7<H>  /  Alb  3.1<L>  /  TBili  0.5  /  DBili  x   /  AST  38  /  ALT  26  /  AlkPhos  106      Lactate 2.6            @ 23:45    Lactate 10.5            @ 20:35    Lactate 13.3            @ 19:02          PT/INR - ( 28 May 2020 19:03 )   PT: 12.7 sec;   INR: 1.14 ratio         PTT - ( 28 May 2020 19:03 )  PTT:50.6 sec  Urinalysis Basic - ( 28 May 2020 19:40 )    Color: Yellow / Appearance: Turbid / S.010 / pH: x  Gluc: x / Ketone: Negative  / Bili: Negative / Urobili: Negative mg/dL   Blood: x / Protein: 30 mg/dL / Nitrite: Negative   Leuk Esterase: Moderate / RBC: 0-2 /HPF / WBC 3-5   Sq Epi: x / Non Sq Epi: Few / Bacteria: Few                REID: Y chronic                          GLOBAL ISSUE/BEST PRACTICE  Analgesia: N  Sedation: Y  CAM-ICU: Neg  HOB elevation: yes  Stress ulcer prophylaxis: Y  VTE prophylaxis: Y  Glycemic control: N  Nutrition: N Patient is a 74y old  Female who presents with a chief complaint of RESPIRATORY DISTRESS (29 May 2020 00:56)    24 hour events: 75 yo woman with frontotemporal dementia, chronic vent dependent respiratory failure, recent COVID pneumonia admitted with respiratory distress and spasms of extremities.  Started on vanc and zosyn for possible sepsis.        T(F): 97.4 (20 @ 07:30), Max: 100.9 (20 @ 18:32)  HR: 57 (20 @ 07:43) (51 - 139)  BP: 108/53 (20 @ 07:30) (93/52 - 185/86)  RR: 25 (20 @ 07:30) (17 - 60)  SpO2: 100% (20 @ 07:43) (91% - 100%)  Wt(kg): --    Mode: AC/ CMV (Assist Control/ Continuous Mandatory Ventilation), RR (machine): 14, TV (machine): 420, FiO2: 35, PEEP: 5        I&O's Summary     @ 07:01  -   @ 07:00  --------------------------------------------------------  IN: 1554 mL / OUT: 2100 mL / NET: -546 mL      PHYSICAL EXAM  General: NAD  CNS: sedated  HEENT: +trach  Resp: DC BS b/l  CVS: RRR  Abd: soft, NT/ND  Ext: no edema  Skin: warm well perfused    MEDICATIONS  piperacillin/tazobactam IVPB.. IV Intermittent  vancomycin  IVPB IV Intermittent          dexMEDEtomidine Infusion IV Continuous      heparin   Injectable SubCutaneous    pantoprazole  Injectable IV Push      lactated ringers. IV Continuous      chlorhexidine 0.12% Liquid Oral Mucosa  chlorhexidine 2% Cloths Topical                            9.6    8.90  )-----------( 206      ( 29 May 2020 05:51 )             31.7           142  |  111<H>  |  20  ----------------------------<  171<H>  4.8   |  26  |  0.77    Ca    8.5      29 May 2020 05:51  Phos  3.5       Mg     2.1         TPro  8.7<H>  /  Alb  3.1<L>  /  TBili  0.5  /  DBili  x   /  AST  38  /  ALT  26  /  AlkPhos  106      Lactate 2.6            @ 23:45    Lactate 10.5            @ 20:35    Lactate 13.3            @ 19:02          PT/INR - ( 28 May 2020 19:03 )   PT: 12.7 sec;   INR: 1.14 ratio         PTT - ( 28 May 2020 19:03 )  PTT:50.6 sec  Urinalysis Basic - ( 28 May 2020 19:40 )    Color: Yellow / Appearance: Turbid / S.010 / pH: x  Gluc: x / Ketone: Negative  / Bili: Negative / Urobili: Negative mg/dL   Blood: x / Protein: 30 mg/dL / Nitrite: Negative   Leuk Esterase: Moderate / RBC: 0-2 /HPF / WBC 3-5   Sq Epi: x / Non Sq Epi: Few / Bacteria: Few                REID: Y chronic                          GLOBAL ISSUE/BEST PRACTICE  Analgesia: N  Sedation: Y  CAM-ICU: Neg  HOB elevation: yes  Stress ulcer prophylaxis: Y  VTE prophylaxis: Y  Glycemic control: N  Nutrition: N

## 2020-05-29 NOTE — CONSULT NOTE ADULT - PROBLEM SELECTOR RECOMMENDATION 9
-no clear localization and reasonable to continue broad spectrum abx for now -no clear localization and reasonable to continue broad spectrum abx for now  recommend: sputum, nares MRSA PCR, urine legionella, micro workup (blood, urine)

## 2020-05-29 NOTE — DIETITIAN INITIAL EVALUATION ADULT. - ENTERAL
Glucerna 1.5 30cc/hr x 20 hrs, advance by 10cc Q8h until goal rate of 50cc/hr x 20 hrs attained to provide 1500 kim, 82.5 gm protein.

## 2020-05-29 NOTE — PROGRESS NOTE ADULT - SUBJECTIVE AND OBJECTIVE BOX
BREA BECKHAM    [facility]    Patient is a 74y old  Female who presents with a chief complaint of      Allergies    codeine (Hives)    Intolerances        HPI:      PAST MEDICAL & SURGICAL HISTORY:  Pneumonia  Quadriplegia  COVID-19 virus detected  Advanced dementia  DM (diabetes mellitus)  HTN (hypertension)  CVA (cerebral vascular accident)  Respiratory failure  Constipation  GERD (gastroesophageal reflux disease)  Hypertension  Respiratory failure  Diabetes mellitus  Aphasic stroke  Dementia of frontal lobe type  Feeding by G-tube  Tracheostomy tube present  Tracheostomy in place  Gastrostomy in place  Hx of appendectomy      FAMILY HISTORY:  No pertinent family history in first degree relatives        MEDICATIONS   chlorhexidine 0.12% Liquid 15 milliLiter(s) Oral Mucosa every 12 hours      Vital Signs Last 24 Hrs  T(C): 36.3 (29 May 2020 07:30), Max: 38.3 (28 May 2020 18:32)  T(F): 97.4 (29 May 2020 07:30), Max: 100.9 (28 May 2020 18:32)  HR: 53 (29 May 2020 11:00) (51 - 139)  BP: 98/54 (29 May 2020 11:00) (93/52 - 185/86)  BP(mean): 73 (29 May 2020 11:00) (68 - 123)  RR: 14 (29 May 2020 11:00) (14 - 60)  SpO2: 100% (29 May 2020 11:00) (91% - 100%)        Mode: AC/ CMV (Assist Control/ Continuous Mandatory Ventilation), RR (machine): 14, TV (machine): 420, FiO2: 60, PEEP: 5, ITime: 1, MAP: 10, PIP: 34    LABS:                        9.6    8.90  )-----------( 206      ( 29 May 2020 05:51 )             31.7     05-29    142  |  111<H>  |  20  ----------------------------<  171<H>  4.8   |  26  |  0.77    Ca    8.5      29 May 2020 05:51  Phos  3.5     05-  Mg     2.1     -    TPro  8.7<H>  /  Alb  3.1<L>  /  TBili  0.5  /  DBili  x   /  AST  38  /  ALT  26  /  AlkPhos  106  -28    PT/INR - ( 28 May 2020 19:03 )   PT: 12.7 sec;   INR: 1.14 ratio         PTT - ( 28 May 2020 19:03 )  PTT:50.6 sec  Urinalysis Basic - ( 28 May 2020 19:40 )    Color: Yellow / Appearance: Turbid / S.010 / pH: x  Gluc: x / Ketone: Negative  / Bili: Negative / Urobili: Negative mg/dL   Blood: x / Protein: 30 mg/dL / Nitrite: Negative   Leuk Esterase: Moderate / RBC: 0-2 /HPF / WBC 3-5   Sq Epi: x / Non Sq Epi: Few / Bacteria: Few        ABG - ( 29 May 2020 01:12 )  pH, Arterial: 7.30  pH, Blood: x     /  pCO2: 51    /  pO2: 117   / HCO3: 23    / Base Excess: -1.3  /  SaO2: 98                  WBC:  WBC Count: 8.90 K/uL ( @ 05:51)  WBC Count: 21.68 K/uL ( @ 19:02)      MICROBIOLOGY:  RECENT CULTURES:              PT/INR - ( 28 May 2020 19:03 )   PT: 12.7 sec;   INR: 1.14 ratio         PTT - ( 28 May 2020 19:03 )  PTT:50.6 sec    Sodium:  Sodium, Serum: 142 mmol/L ( @ 05:51)  Sodium, Serum: 140 mmol/L ( @ 23:45)  Sodium, Serum: 139 mmol/L ( @ 19:02)      0.77 mg/dL  @ 05:51  0.83 mg/dL  @ 23:45  1.37 mg/dL  @ 19:02      Hemoglobin:  Hemoglobin: 9.6 g/dL ( @ 05:51)  Hemoglobin: 11.9 g/dL ( @ 19:02)      Platelets: Platelet Count - Automated: 206 K/uL ( @ 05:51)  Platelet Count - Automated: 386 K/uL ( @ 19:02)      LIVER FUNCTIONS - ( 28 May 2020 19:02 )  Alb: 3.1 g/dL / Pro: 8.7 g/dL / ALK PHOS: 106 U/L / ALT: 26 U/L DA / AST: 38 U/L / GGT: x             Urinalysis Basic - ( 28 May 2020 19:40 )    Color: Yellow / Appearance: Turbid / S.010 / pH: x  Gluc: x / Ketone: Negative  / Bili: Negative / Urobili: Negative mg/dL   Blood: x / Protein: 30 mg/dL / Nitrite: Negative   Leuk Esterase: Moderate / RBC: 0-2 /HPF / WBC 3-5   Sq Epi: x / Non Sq Epi: Few / Bacteria: Few        RADIOLOGY & ADDITIONAL STUDIES:

## 2020-05-29 NOTE — PROGRESS NOTE ADULT - SUBJECTIVE AND OBJECTIVE BOX
PROGRESS NOTE  Patient is a 74y old  Female who presents with a chief complaint of RESPIRATORY DISTRESS (29 May 2020 11:00)  Chart and available morning labs /imaging are reviewed electronically , urgent issues addressed . More information  is being added upon completion of rounds , when more information is collected and management discussed with consultants , medical staff and social service/case management on the floor   OVERNIGHT  No new issues reported by medical staff . All above noted Patient is resting in a bed comfortably  .No distress noted   not able to provide ROS or answer questions   HPI:  74 Female transferred from  [facility] to [place] ED with report of sepsis, pneumonia, RYAN, patient on chronic trach collar , recent admission to [facility]  20 to 20 for hypoxia, pneumonia, COVID positive 20 ,developed hypoxia and was placed on respiratory support ,required ICU admission with shock and was placed on pressors  .Treated for UTI and pseudomonal pneumonia ,s/p cefepime ,had elevation of D-dimers .CTT was negative for PE , Doppler neg for DVT .Seen by neurologist for sz -like activity ,neurologist impression was that it was related to hypoxia CT Brain was negative for CVA .COVID  tested negative on  and   Patient had been sent to ER for respiratory distress ,associated with hypotension tachycardia ,tachypnea and diaphoresis  .Patient has a hx significant for Advanced dementia  ,Aphasic stroke  ,Constipation  ,COVID-19 virus detected  ,COPD ,Anxiety ,Dysphagia ,hyperkalemia ,pneumonia ,pulmonary edema , CVA (cerebral vascular accident)  ,Dementia of frontal lobe type  ,Diabetes mellitus  ,DM (diabetes mellitus)  ,GERD (gastroesophageal reflux disease)  ,HTN (hypertension)  ,Hypertension  ,Pneumonia  ,Quadriplegia  ,Respiratory failure  ,Feeding by G-tube  ,Gastrostomy in place  ,Hx of appendectomy  ,Tracheostomy in place  ,Tracheostomy tube present, 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 Admitted to ICU ,seen by pulm cons Dr Sandoval on admission to [place] ED ( transferred from [facility])and was admitted to ICU  for septic workup and evaluation ,send blood and urine cx, serial lactate levels ,monitor vitals closely hydration ,monitor urine output and renal profile ,iv abx initiated Palliative care consult requested ,to discuss advance directives and complete MOLST .Patient was diagnosed with sepsis 2/2 to pneumonia and  called ER respiratory and  ICU ,patient  already received Cefepime 2gm iv, vancomycin  1gm iv, rectal Tylenol 650mg, LR x 1 liter, NS x 1 liter in [place] ED .Further plan of care as per pulmonologist Dr Sandoval and icu team (28 May 2020 23:07)  PAST MEDICAL & SURGICAL HISTORY:  Pneumonia  Quadriplegia  COVID-19 virus detected  Advanced dementia  DM (diabetes mellitus)  HTN (hypertension)  CVA (cerebral vascular accident)  Respiratory failure  Constipation  GERD (gastroesophageal reflux disease)  Hypertension  Respiratory failure  Diabetes mellitus  Aphasic stroke  Dementia of frontal lobe type  Feeding by G-tube  Tracheostomy tube present  Tracheostomy in place  Gastrostomy in place  Hx of appendectomy  MEDICATIONS  (STANDING):  chlorhexidine 0.12% Liquid 15 milliLiter(s) Oral Mucosa every 12 hours  chlorhexidine 2% Cloths 1 Application(s) Topical daily  dexMEDEtomidine Infusion 0.5 MICROgram(s)/kG/Hr (7.94 mL/Hr) IV Continuous <Continuous>  dextrose 5%. 1000 milliLiter(s) (50 mL/Hr) IV Continuous <Continuous>  dextrose 50% Injectable 12.5 Gram(s) IV Push once  dextrose 50% Injectable 25 Gram(s) IV Push once  dextrose 50% Injectable 25 Gram(s) IV Push once  fentaNYL   Patch  12 MICROgram(s)/Hr 1 Patch Transdermal every 72 hours  heparin   Injectable 5000 Unit(s) SubCutaneous every 8 hours  insulin lispro (HumaLOG) corrective regimen sliding scale   SubCutaneous three times a day before meals  pantoprazole  Injectable 40 milliGRAM(s) IV Push daily  piperacillin/tazobactam IVPB.. 3.375 Gram(s) IV Intermittent every 8 hours  vancomycin  IVPB 1000 milliGRAM(s) IV Intermittent every 12 hours  MEDICATIONS  (PRN):  dextrose 40% Gel 15 Gram(s) Oral once PRN Blood Glucose LESS THAN 70 milliGRAM(s)/deciliter  glucagon  Injectable 1 milliGRAM(s) IntraMuscular once PRN Glucose LESS THAN 70 milligrams/deciliter  NOTE In accordance with  [facility] policy ICU final medical management decisions/MD orders are  determined/done only by ICU Intensivists   OBJECTIVE  T(C): 36.6 (20 @ 12:00), Max: 38.3 (20 @ 18:32)  HR: 53 (20 @ 11:00) (51 - 139)  BP: 98/54 (20 @ 11:00) (93/52 - 185/86)  RR: 14 (20 @ 11:00) (14 - 60)  SpO2: 100% (20 @ 11:00) (91% - 100%)  Wt(kg): --  I&O's Summary    28 May 2020 07:  -  29 May 2020 07:00  --------------------------------------------------------  IN: 1554 mL / OUT: 2100 mL / NET: -546 mL    29 May 2020 07:  -  29 May 2020 13:05  --------------------------------------------------------  IN: 615 mL / OUT: 0 mL / NET: 615 mL          REVIEW OF SYSTEMS:    PHYSICAL EXAM  General: NAD  CNS: sedated  HEENT: +trach  Resp: DC BS b/l  CVS: RRR  Abd: soft, NT/ND  Ext: no edema  Skin: warm well perfused                        9.6    8.90  )-----------( 206      ( 29 May 2020 05:51 )             31.7         142  |  111<H>  |  20  ----------------------------<  171<H>  4.8   |  26  |  0.77    Ca    8.5      29 May 2020 05:51  Phos  3.5       Mg     2.1         TPro  8.7<H>  /  Alb  3.1<L>  /  TBili  0.5  /  DBili  x   /  AST  38  /  ALT  26  /  AlkPhos  106      CAPILLARY BLOOD GLUCOSE        PT/INR - ( 28 May 2020 19:03 )   PT: 12.7 sec;   INR: 1.14 ratio         PTT - ( 28 May 2020 19:03 )  PTT:50.6 sec  Urinalysis Basic - ( 28 May 2020 19:40 )    Color: Yellow / Appearance: Turbid / S.010 / pH: x  Gluc: x / Ketone: Negative  / Bili: Negative / Urobili: Negative mg/dL   Blood: x / Protein: 30 mg/dL / Nitrite: Negative   Leuk Esterase: Moderate / RBC: 0-2 /HPF / WBC 3-5   Sq Epi: x / Non Sq Epi: Few / Bacteria: Few        RADIOLOGY & ADDITIONAL TESTS:< from: Xray Chest 1 View- PORTABLE-Routine (20 @ 08:52) >  EXAM:  XR CHEST PORTABLE ROUTINE 1V                            PROCEDURE DATE:  2020          INTERPRETATION:  Follow-up.    AP chest. Prior 2020.    Low lung volumes. Tracheostomy cannula reidentified in position. No change heart mediastinum. No significant change bilateral airspace infiltrates. No significant pleural effusion pneumothorax or other new abnormality.    Impression: Essentially stable exam.    < end of copied text >  < from: Xray Chest 1 View-PORTABLE IMMEDIATE (20 @ 19:29) >  EXAM:  XR CHEST PORTABLE IMMED 1V                                  PROCEDURE DATE:  2020          INTERPRETATION:  CLINICAL HISTORY: Shortness of breath.  Cough.  Fever.    TECHNIQUE: Portable AP chest radiograph    COMPARISON STUDY: None.    FINDINGS:   Multiple EKG leads and other external devices are superimposed over the chest and abdomen.    A tracheostomy cannula is superimposed over the tracheal air column.    Mild patchy opacity in both lungs is improved from 2020.    The cardiomediastinal silhouette appears within normal limits.    The patient's left forearm is superimposed over the upper abdomen.  Orthopedic hardware is partially visualized in the distal left radius.    IMPRESSION:   Mild patchy opacity in both lungs is improved from 2020.    < end of copied text >     reviewed elctronically  ASSESSMENT/PLAN:

## 2020-05-29 NOTE — PROGRESS NOTE ADULT - ASSESSMENT
74 Female transferred from  [facility] center to [place] ED with report of sepsis, pneumonia, RYAN, patient on chronic trach collar , recent admission to [facility]  5/4/20 to 5/18/20 for hypoxia, pneumonia, COVID positive 04/27/20 ,developed hypoxia and was placed on respiratory support ,required ICU admission with shock and was placed on pressors  .Treated for UTI and pseudomonal pneumonia ,s/p cefepime ,had elevation of D-dimers .CTT was negative for PE , Doppler neg for DVT .Seen by neurologist for sz -like activity ,neurologist impression was that it was related to hypoxia CT Brain was negative for CVA .COVID  tested negative on 05/11 and 05/12  Patient had been sent to ER for respiratory distress ,associated with hypotension tachycardia ,tachypnea and diaphoresis  .Patient has a hx significant for Advanced dementia  ,Aphasic stroke  ,Constipation  ,COVID-19 virus detected  ,COPD ,Anxiety ,Dysphagia ,hyperkalemia ,pneumonia ,pulmonary edema , CVA (cerebral vascular accident)  ,Dementia of frontal lobe type  ,Diabetes mellitus  ,DM (diabetes mellitus)  ,GERD (gastroesophageal reflux disease)  ,HTN (hypertension)  ,Hypertension  ,Pneumonia  ,Quadriplegia  ,Respiratory failure  ,Feeding by G-tube  ,Gastrostomy in place  ,Hx of appendectomy  ,Tracheostomy in place  ,Tracheostomy tube present ,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 Admitted to ICU ,seen by pulm cons Dr Sandoval on admission to [place] ED ( transferred from [facility])and was admitted to ICU  for septic workup and evaluation ,send blood and urine cx, serial lactate levels ,monitor vitals closely hydration ,monitor urine output and renal profile ,iv abx initiated Palliative care consult requested ,to discuss advance directives and complete MOLST Patient was diagnosed with sepsis 2/2 to pneumonia and  called ER respiratory and  ICU ,patient  already received Cefepime 2gm iv, vancomycin  1gm iv, rectal Tylenol 650mg, LR x 1 liter, NS x 1 liter in [place] ED .Further plan of care as per pulmonologist Dr Sandoval and icu team

## 2020-05-30 LAB
ALBUMIN SERPL ELPH-MCNC: 2.4 G/DL — LOW (ref 3.3–5)
ALP SERPL-CCNC: 77 U/L — SIGNIFICANT CHANGE UP (ref 40–120)
ALT FLD-CCNC: 22 U/L — SIGNIFICANT CHANGE UP (ref 12–78)
ANION GAP SERPL CALC-SCNC: 7 MMOL/L — SIGNIFICANT CHANGE UP (ref 5–17)
AST SERPL-CCNC: 20 U/L — SIGNIFICANT CHANGE UP (ref 15–37)
BASOPHILS # BLD AUTO: 0.04 K/UL — SIGNIFICANT CHANGE UP (ref 0–0.2)
BASOPHILS NFR BLD AUTO: 0.6 % — SIGNIFICANT CHANGE UP (ref 0–2)
BILIRUB SERPL-MCNC: 0.6 MG/DL — SIGNIFICANT CHANGE UP (ref 0.2–1.2)
BUN SERPL-MCNC: 15 MG/DL — SIGNIFICANT CHANGE UP (ref 7–23)
CALCIUM SERPL-MCNC: 8.5 MG/DL — SIGNIFICANT CHANGE UP (ref 8.5–10.1)
CHLORIDE SERPL-SCNC: 108 MMOL/L — SIGNIFICANT CHANGE UP (ref 96–108)
CO2 SERPL-SCNC: 27 MMOL/L — SIGNIFICANT CHANGE UP (ref 22–31)
CREAT SERPL-MCNC: 0.84 MG/DL — SIGNIFICANT CHANGE UP (ref 0.5–1.3)
CULTURE RESULTS: SIGNIFICANT CHANGE UP
EOSINOPHIL # BLD AUTO: 0.26 K/UL — SIGNIFICANT CHANGE UP (ref 0–0.5)
EOSINOPHIL NFR BLD AUTO: 3.7 % — SIGNIFICANT CHANGE UP (ref 0–6)
GLUCOSE SERPL-MCNC: 172 MG/DL — HIGH (ref 70–99)
HCT VFR BLD CALC: 30.2 % — LOW (ref 34.5–45)
HGB BLD-MCNC: 9.2 G/DL — LOW (ref 11.5–15.5)
IMM GRANULOCYTES NFR BLD AUTO: 0.6 % — SIGNIFICANT CHANGE UP (ref 0–1.5)
LACTATE SERPL-SCNC: 7.9 MMOL/L — CRITICAL HIGH (ref 0.7–2)
LYMPHOCYTES # BLD AUTO: 1.07 K/UL — SIGNIFICANT CHANGE UP (ref 1–3.3)
LYMPHOCYTES # BLD AUTO: 15.1 % — SIGNIFICANT CHANGE UP (ref 13–44)
MAGNESIUM SERPL-MCNC: 2.1 MG/DL — SIGNIFICANT CHANGE UP (ref 1.6–2.6)
MCHC RBC-ENTMCNC: 27.1 PG — SIGNIFICANT CHANGE UP (ref 27–34)
MCHC RBC-ENTMCNC: 30.5 GM/DL — LOW (ref 32–36)
MCV RBC AUTO: 88.8 FL — SIGNIFICANT CHANGE UP (ref 80–100)
METHOD TYPE: SIGNIFICANT CHANGE UP
MONOCYTES # BLD AUTO: 0.75 K/UL — SIGNIFICANT CHANGE UP (ref 0–0.9)
MONOCYTES NFR BLD AUTO: 10.6 % — SIGNIFICANT CHANGE UP (ref 2–14)
MRSA PCR RESULT.: SIGNIFICANT CHANGE UP
NEUTROPHILS # BLD AUTO: 4.92 K/UL — SIGNIFICANT CHANGE UP (ref 1.8–7.4)
NEUTROPHILS NFR BLD AUTO: 69.4 % — SIGNIFICANT CHANGE UP (ref 43–77)
NRBC # BLD: 0 /100 WBCS — SIGNIFICANT CHANGE UP (ref 0–0)
PHOSPHATE SERPL-MCNC: 2.2 MG/DL — LOW (ref 2.5–4.5)
PLATELET # BLD AUTO: 214 K/UL — SIGNIFICANT CHANGE UP (ref 150–400)
POTASSIUM SERPL-MCNC: 3.7 MMOL/L — SIGNIFICANT CHANGE UP (ref 3.5–5.3)
POTASSIUM SERPL-SCNC: 3.7 MMOL/L — SIGNIFICANT CHANGE UP (ref 3.5–5.3)
PROLACTIN SERPL-MCNC: 30.6 NG/ML — HIGH (ref 3.4–24.1)
PROT SERPL-MCNC: 6.7 G/DL — SIGNIFICANT CHANGE UP (ref 6–8.3)
RBC # BLD: 3.4 M/UL — LOW (ref 3.8–5.2)
RBC # FLD: 15.6 % — HIGH (ref 10.3–14.5)
S AUREUS DNA NOSE QL NAA+PROBE: SIGNIFICANT CHANGE UP
SARS-COV-2 IGG SERPL QL IA: POSITIVE
SARS-COV-2 IGM SERPL IA-ACNC: 4.5 INDEX — HIGH
SODIUM SERPL-SCNC: 142 MMOL/L — SIGNIFICANT CHANGE UP (ref 135–145)
SPECIMEN SOURCE: SIGNIFICANT CHANGE UP
WBC # BLD: 7.08 K/UL — SIGNIFICANT CHANGE UP (ref 3.8–10.5)
WBC # FLD AUTO: 7.08 K/UL — SIGNIFICANT CHANGE UP (ref 3.8–10.5)

## 2020-05-30 PROCEDURE — 99233 SBSQ HOSP IP/OBS HIGH 50: CPT

## 2020-05-30 RX ORDER — POLYETHYLENE GLYCOL 3350 17 G/17G
17 POWDER, FOR SOLUTION ORAL DAILY
Refills: 0 | Status: DISCONTINUED | OUTPATIENT
Start: 2020-05-30 | End: 2020-06-04

## 2020-05-30 RX ORDER — POTASSIUM PHOSPHATE, MONOBASIC POTASSIUM PHOSPHATE, DIBASIC 236; 224 MG/ML; MG/ML
15 INJECTION, SOLUTION INTRAVENOUS ONCE
Refills: 0 | Status: COMPLETED | OUTPATIENT
Start: 2020-05-30 | End: 2020-05-30

## 2020-05-30 RX ADMIN — FENTANYL CITRATE 1 PATCH: 50 INJECTION INTRAVENOUS at 22:05

## 2020-05-30 RX ADMIN — CHLORHEXIDINE GLUCONATE 15 MILLILITER(S): 213 SOLUTION TOPICAL at 18:10

## 2020-05-30 RX ADMIN — POLYETHYLENE GLYCOL 3350 17 GRAM(S): 17 POWDER, FOR SOLUTION ORAL at 12:12

## 2020-05-30 RX ADMIN — CHLORHEXIDINE GLUCONATE 1 APPLICATION(S): 213 SOLUTION TOPICAL at 12:07

## 2020-05-30 RX ADMIN — FENTANYL CITRATE 1 PATCH: 50 INJECTION INTRAVENOUS at 07:16

## 2020-05-30 RX ADMIN — HEPARIN SODIUM 5000 UNIT(S): 5000 INJECTION INTRAVENOUS; SUBCUTANEOUS at 13:13

## 2020-05-30 RX ADMIN — Medication 250 MILLIGRAM(S): at 05:03

## 2020-05-30 RX ADMIN — HEPARIN SODIUM 5000 UNIT(S): 5000 INJECTION INTRAVENOUS; SUBCUTANEOUS at 05:02

## 2020-05-30 RX ADMIN — Medication 2: at 12:07

## 2020-05-30 RX ADMIN — Medication 2: at 18:11

## 2020-05-30 RX ADMIN — HEPARIN SODIUM 5000 UNIT(S): 5000 INJECTION INTRAVENOUS; SUBCUTANEOUS at 22:07

## 2020-05-30 RX ADMIN — PANTOPRAZOLE SODIUM 40 MILLIGRAM(S): 20 TABLET, DELAYED RELEASE ORAL at 12:08

## 2020-05-30 RX ADMIN — PIPERACILLIN AND TAZOBACTAM 25 GRAM(S): 4; .5 INJECTION, POWDER, LYOPHILIZED, FOR SOLUTION INTRAVENOUS at 00:18

## 2020-05-30 RX ADMIN — PIPERACILLIN AND TAZOBACTAM 25 GRAM(S): 4; .5 INJECTION, POWDER, LYOPHILIZED, FOR SOLUTION INTRAVENOUS at 09:34

## 2020-05-30 RX ADMIN — POTASSIUM PHOSPHATE, MONOBASIC POTASSIUM PHOSPHATE, DIBASIC 62.5 MILLIMOLE(S): 236; 224 INJECTION, SOLUTION INTRAVENOUS at 09:33

## 2020-05-30 RX ADMIN — Medication 2: at 05:25

## 2020-05-30 RX ADMIN — CHLORHEXIDINE GLUCONATE 15 MILLILITER(S): 213 SOLUTION TOPICAL at 05:02

## 2020-05-30 RX ADMIN — Medication 1: at 00:18

## 2020-05-30 NOTE — PROGRESS NOTE ADULT - PROBLEM SELECTOR PLAN 2
2/2 to pneumonia ,poa -was on vanco and zosyn ,seen by ID CONSULT-  low suspicion at this point for infectious  so would dc abx and observe off abx.

## 2020-05-30 NOTE — PROGRESS NOTE ADULT - SUBJECTIVE AND OBJECTIVE BOX
BREADIOMEDES BECKHAM    [facility] ICU1 02    Patient is a 74y old  Female who presents with a chief complaint of RESPIRATORY DISTRESS (30 May 2020 10:46)       Allergies    codeine (Hives)    Intolerances        HPI:  74 Female transferred from  [facility] to [place] ED with report of sepsis, pneumonia, RYAN, patient on chronic trach collar , recent admission to [facility]  20 to 20 for hypoxia, pneumonia, COVID positive 20 ,developed hypoxia and was placed on respiratory support ,required ICU admission with shock and was placed on pressors  .Treated for UTI and pseudomonal pneumonia ,s/p cefepime ,had elevation of D-dimers .CTT was negative for PE , Doppler neg for DVT .Seen by neurologist for sz -like activity ,neurologist impression was that it was related to hypoxia CT Brain was negative for CVA .COVID  tested negative on  and   Patient had been sent to ER for respiratory distress ,associated with hypotension tachycardia ,tachypnea and diaphoresis  .Patient has a hx significant for Advanced dementia  ,Aphasic stroke  ,Constipation  ,COVID-19 virus detected  ,COPD ,Anxiety ,Dysphagia ,hyperkalemia ,pneumonia ,pulmonary edema , CVA (cerebral vascular accident)  ,Dementia of frontal lobe type  ,Diabetes mellitus  ,DM (diabetes mellitus)  ,GERD (gastroesophageal reflux disease)  ,HTN (hypertension)  ,Hypertension  ,Pneumonia  ,Quadriplegia  ,Respiratory failure  ,Feeding by G-tube  ,Gastrostomy in place  ,Hx of appendectomy  ,Tracheostomy in place  ,Tracheostomy tube present, 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 Admitted to ICU ,seen by pulm cons Dr Sandoval on admission to [place] ED ( transferred from [facility])and was admitted to ICU  for septic workup and evaluation ,send blood and urine cx, serial lactate levels ,monitor vitals closely hydration ,monitor urine output and renal profile ,iv abx initiated Palliative care consult requested ,to discuss advance directives and complete MOLST .Patient was diagnosed with sepsis 2/2 to pneumonia and  called ER respiratory and  ICU ,patient  already received Cefepime 2gm iv, vancomycin  1gm iv, rectal Tylenol 650mg, LR x 1 liter, NS x 1 liter in [place] ED .Further plan of care as per pulmonologist Dr Sandoval and icu team (28 May 2020 23:07)      PAST MEDICAL & SURGICAL HISTORY:  Pneumonia  Quadriplegia  COVID-19 virus detected  Advanced dementia  DM (diabetes mellitus)  HTN (hypertension)  CVA (cerebral vascular accident)  Respiratory failure  Constipation  GERD (gastroesophageal reflux disease)  Hypertension  Respiratory failure  Diabetes mellitus  Aphasic stroke  Dementia of frontal lobe type  Feeding by G-tube  Tracheostomy tube present  Tracheostomy in place  Gastrostomy in place  Hx of appendectomy      FAMILY HISTORY:  No pertinent family history in first degree relatives        MEDICATIONS   chlorhexidine 0.12% Liquid 15 milliLiter(s) Oral Mucosa every 12 hours  chlorhexidine 2% Cloths 1 Application(s) Topical daily  dextrose 40% Gel 15 Gram(s) Oral once PRN  dextrose 5%. 1000 milliLiter(s) IV Continuous <Continuous>  dextrose 50% Injectable 12.5 Gram(s) IV Push once  dextrose 50% Injectable 25 Gram(s) IV Push once  dextrose 50% Injectable 25 Gram(s) IV Push once  fentaNYL   Patch  12 MICROgram(s)/Hr 1 Patch Transdermal every 72 hours  glucagon  Injectable 1 milliGRAM(s) IntraMuscular once PRN  heparin   Injectable 5000 Unit(s) SubCutaneous every 8 hours  insulin lispro (HumaLOG) corrective regimen sliding scale   SubCutaneous every 6 hours  pantoprazole  Injectable 40 milliGRAM(s) IV Push daily      Vital Signs Last 24 Hrs  T(C): 37.2 (30 May 2020 08:00), Max: 37.2 (29 May 2020 15:35)  T(F): 98.9 (30 May 2020 08:00), Max: 99 (29 May 2020 15:35)  HR: 91 (30 May 2020 11:00) (53 - 101)  BP: 137/68 (30 May 2020 11:00) (90/51 - 157/69)  BP(mean): 92 (30 May 2020 11:00) (66 - 99)  RR: 22 (30 May 2020 11:00) (12 - 27)  SpO2: 100% (30 May 2020 11:00) (98% - 100%)      20 @ 07:01  -  20 @ 07:00  --------------------------------------------------------  IN: 1825 mL / OUT: 1110 mL / NET: 715 mL        Mode: AC/ CMV (Assist Control/ Continuous Mandatory Ventilation), RR (machine): 14, TV (machine): 420, FiO2: 30, PEEP: 5, ITime: 1, MAP: 14, PIP: 28    LABS:                        9.2    7.08  )-----------( 214      ( 30 May 2020 04:58 )             30.2     05-30    142  |  108  |  15  ----------------------------<  172<H>  3.7   |  27  |  0.84    Ca    8.5      30 May 2020 04:58  Phos  2.2       Mg     2.1         TPro  6.7  /  Alb  2.4<L>  /  TBili  0.6  /  DBili  x   /  AST  20  /  ALT  22  /  AlkPhos  77  -30    PT/INR - ( 28 May 2020 19:03 )   PT: 12.7 sec;   INR: 1.14 ratio         PTT - ( 28 May 2020 19:03 )  PTT:50.6 sec  Urinalysis Basic - ( 28 May 2020 19:40 )    Color: Yellow / Appearance: Turbid / S.010 / pH: x  Gluc: x / Ketone: Negative  / Bili: Negative / Urobili: Negative mg/dL   Blood: x / Protein: 30 mg/dL / Nitrite: Negative   Leuk Esterase: Moderate / RBC: 0-2 /HPF / WBC 3-5   Sq Epi: x / Non Sq Epi: Few / Bacteria: Few        ABG - ( 29 May 2020 01:12 )  pH, Arterial: 7.30  pH, Blood: x     /  pCO2: 51    /  pO2: 117   / HCO3: 23    / Base Excess: -1.3  /  SaO2: 98                  WBC:  WBC Count: 7.08 K/uL ( @ 04:58)  WBC Count: 8.90 K/uL ( @ 05:51)  WBC Count: 21.68 K/uL ( @ 19:02)      MICROBIOLOGY:  RECENT CULTURES:   .Sputum Sputum XXXX   Few polymorphonuclear leukocytes per low power field  Few Squamous epithelial cells per low power field  Few Gram Negative Rods per oil power field XXXX     .Urine Catheterized XXXX XXXX   <10,000 CFU/mL Normal Urogenital Elvira     .Blood Blood-Peripheral XXXX XXXX   No growth to date.                PT/INR - ( 28 May 2020 19:03 )   PT: 12.7 sec;   INR: 1.14 ratio         PTT - ( 28 May 2020 19:03 )  PTT:50.6 sec    Sodium:  Sodium, Serum: 142 mmol/L ( @ 04:58)  Sodium, Serum: 142 mmol/L ( @ 05:51)  Sodium, Serum: 140 mmol/L ( 23:45)  Sodium, Serum: 139 mmol/L ( 19:02)      0.84 mg/dL  04:58  0.77 mg/dL  05:51  0.83 mg/dL  23:45  1.37 mg/dL  @ 19:02      Hemoglobin:  Hemoglobin: 9.2 g/dL ( @ 04:58)  Hemoglobin: 9.6 g/dL ( @ 05:51)  Hemoglobin: 11.9 g/dL ( @ 19:02)      Platelets: Platelet Count - Automated: 214 K/uL ( @ 04:58)  Platelet Count - Automated: 206 K/uL ( @ 05:51)  Platelet Count - Automated: 386 K/uL ( @ 19:02)      LIVER FUNCTIONS - ( 30 May 2020 04:58 )  Alb: 2.4 g/dL / Pro: 6.7 g/dL / ALK PHOS: 77 U/L / ALT: 22 U/L / AST: 20 U/L / GGT: x             Urinalysis Basic - ( 28 May 2020 19:40 )    Color: Yellow / Appearance: Turbid / S.010 / pH: x  Gluc: x / Ketone: Negative  / Bili: Negative / Urobili: Negative mg/dL   Blood: x / Protein: 30 mg/dL / Nitrite: Negative   Leuk Esterase: Moderate / RBC: 0-2 /HPF / WBC 3-5   Sq Epi: x / Non Sq Epi: Few / Bacteria: Few        RADIOLOGY & ADDITIONAL STUDIES:

## 2020-05-30 NOTE — PROGRESS NOTE ADULT - ASSESSMENT
74 Female transferred from  [facility] center to [place] ED with report of sepsis, pneumonia, RYAN, patient on chronic trach collar , recent admission to [facility]  5/4/20 to 5/18/20 for hypoxia, pneumonia, COVID positive 04/27/20 ,developed hypoxia and was placed on respiratory support ,required ICU admission with shock and was placed on pressors  .Treated for UTI and pseudomonal pneumonia ,s/p cefepime ,had elevation of D-dimers .CTT was negative for PE , Doppler neg for DVT .Seen by neurologist for sz -like activity ,neurologist impression was that it was related to hypoxia CT Brain was negative for CVA .COVID  tested negative on 05/11 and 05/12  Patient had been sent to ER for respiratory distress ,associated with hypotension tachycardia ,tachypnea and diaphoresis  .Patient has a hx significant for Advanced dementia  ,Aphasic stroke  ,Constipation  ,COVID-19 virus detected  ,COPD ,Anxiety ,Dysphagia ,hyperkalemia ,pneumonia ,pulmonary edema , CVA (cerebral vascular accident)  ,Dementia of frontal lobe type  ,Diabetes mellitus  ,DM (diabetes mellitus)  ,GERD (gastroesophageal reflux disease)  ,HTN (hypertension)  ,Hypertension  ,Pneumonia  ,Quadriplegia  ,Respiratory failure  ,Feeding by G-tube  ,Gastrostomy in place  ,Hx of appendectomy  ,Tracheostomy in place  ,Tracheostomy tube present ,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 Admitted to ICU ,seen by pulm cons Dr Sandoval on admission to [place] ED ( transferred from [facility])and was admitted to ICU  for septic workup and evaluation ,send blood and urine cx, serial lactate levels ,monitor vitals closely hydration ,monitor urine output and renal profile ,iv abx initiated Palliative care consult requested ,to discuss advance directives and complete MOLST Patient was diagnosed with sepsis 2/2 to pneumonia and  called ER respiratory and  ICU ,patient  already received Cefepime 2gm iv, vancomycin  1gm iv, rectal Tylenol 650mg, LR x 1 liter, NS x 1 liter in [place] ED .Further plan of care as per pulmonologist Dr Sandoval and icu team

## 2020-05-30 NOTE — PROGRESS NOTE ADULT - ATTENDING COMMENTS
75 yo woman with frontotemporal dementia, chronic vent dependent respiratory failure, bedbound presenting with respiratory distress, diaphoresis and generalized spasm of extremities.  She was found to be febrile, hypertensive, tachycardic, tachypneic with severe lactic acidosis.  Concern for sz v sepsis.      --no evidence of sz activity  appreciate neuro eval  --hemodynamically stable  --chronic vent dependent respiratory failure  continue full vent support  --normal renal function  --dysphagia, continue TF  chronic constipation, increase bowel regimen  --doubt sepsis, d/c Abx  --chronic vaughn  --[facility]

## 2020-05-30 NOTE — PROGRESS NOTE ADULT - SUBJECTIVE AND OBJECTIVE BOX
infectious diseases progress note:    BREA BECKHAM is a 74y y. o. Female patient    No concerning overnight events    Allergies    codeine (Hives)    Intolerances        ANTIBIOTICS/RELEVANT:  antimicrobials  piperacillin/tazobactam IVPB.. 3.375 Gram(s) IV Intermittent every 8 hours  vancomycin  IVPB 1000 milliGRAM(s) IV Intermittent every 12 hours    immunologic:    OTHER:  chlorhexidine 0.12% Liquid 15 milliLiter(s) Oral Mucosa every 12 hours  chlorhexidine 2% Cloths 1 Application(s) Topical daily  dextrose 40% Gel 15 Gram(s) Oral once PRN  dextrose 5%. 1000 milliLiter(s) IV Continuous <Continuous>  dextrose 50% Injectable 12.5 Gram(s) IV Push once  dextrose 50% Injectable 25 Gram(s) IV Push once  dextrose 50% Injectable 25 Gram(s) IV Push once  fentaNYL   Patch  12 MICROgram(s)/Hr 1 Patch Transdermal every 72 hours  glucagon  Injectable 1 milliGRAM(s) IntraMuscular once PRN  heparin   Injectable 5000 Unit(s) SubCutaneous every 8 hours  insulin lispro (HumaLOG) corrective regimen sliding scale   SubCutaneous every 6 hours  pantoprazole  Injectable 40 milliGRAM(s) IV Push daily      Objective:  Vital Signs Last 24 Hrs  T(C): 37.2 (30 May 2020 08:00), Max: 37.2 (29 May 2020 15:35)  T(F): 98.9 (30 May 2020 08:00), Max: 99 (29 May 2020 15:35)  HR: 88 (30 May 2020 10:00) (53 - 101)  BP: 141/63 (30 May 2020 10:00) (90/51 - 157/69)  BP(mean): 91 (30 May 2020 10:00) (66 - 99)  RR: 24 (30 May 2020 10:00) (12 - 27)  SpO2: 100% (30 May 2020 10:00) (98% - 100%)    T(C): 37.2 (20 @ 08:00), Max: 38.3 (20 @ 18:32)  T(C): 37.2 (20 @ 08:00), Max: 38.3 (20 @ 18:32)  T(C): 37.2 (20 @ 08:00), Max: 38.3 (20 @ 18:32)    PHYSICAL EXAM:  HEENT: NC atraumatic  Neck: supple, on vent via trach  Respiratory: no accessory muscle use, breathing comfortably, symmetrical upper airway sounds  Cardiovascular: distant  Gastrointestinal: normal appearing, nondistended  Extremities: no clubbing, no cyanosis,      LABS:                          9.2    7.08  )-----------( 214      ( 30 May 2020 04:58 )             30.2       7.08  @ 04:58  8.90  @ 05:51  21.68  @ 19:02      05    142  |  108  |  15  ----------------------------<  172<H>  3.7   |  27  |  0.84    Ca    8.5      30 May 2020 04:58  Phos  2.2       Mg     2.1         TPro  6.7  /  Alb  2.4<L>  /  TBili  0.6  /  DBili  x   /  AST  20  /  ALT  22  /  AlkPhos  77        Creatinine, Serum: 0.84 mg/dL (20 @ 04:58)  Creatinine, Serum: 0.77 mg/dL (20 @ 05:51)  Creatinine, Serum: 0.83 mg/dL (20 @ 23:45)  Creatinine, Serum: 1.37 mg/dL (20 @ 19:02)      PT/INR - ( 28 May 2020 19:03 )   PT: 12.7 sec;   INR: 1.14 ratio         PTT - ( 28 May 2020 19:03 )  PTT:50.6 sec  Urinalysis Basic - ( 28 May 2020 19:40 )    Color: Yellow / Appearance: Turbid / S.010 / pH: x  Gluc: x / Ketone: Negative  / Bili: Negative / Urobili: Negative mg/dL   Blood: x / Protein: 30 mg/dL / Nitrite: Negative   Leuk Esterase: Moderate / RBC: 0-2 /HPF / WBC 3-5   Sq Epi: x / Non Sq Epi: Few / Bacteria: Few            INFLAMMATORY MARKERS  Auto Neutrophil #: 4.92 K/uL (20 @ 04:58)  Auto Lymphocyte #: 1.07 K/uL (20 @ 04:58)  Auto Neutrophil #: 16.51 K/uL (20 @ 19:02)  Auto Lymphocyte #: 3.62 K/uL (20 @ 19:02)    Lactate, Blood: 2.6 mmol/L (20 @ 23:45)  Lactate, Blood: 10.5 mmol/L (20 @ 20:35)  Lactate, Blood: 13.3 mmol/L (20 @ 19:02)    Auto Eosinophil #: 0.26 K/uL (20 @ 04:58)  Auto Eosinophil #: 0.17 K/uL (20 @ 19:02)        Procalcitonin, Serum: 2.92 ng/mL (20 @ 00:17)            Ferritin, Serum: 266 ng/mL (20 @ 23:52)        Activated Partial Thromboplastin Time: 50.6 sec (20 @ 19:03)  INR: 1.14 ratio (20 @ 19:03)    D-Dimer Assay, Quantitative: 668 ng/mL DDU (20 @ 19:03)        MICROBIOLOGY:              RADIOLOGY & ADDITIONAL STUDIES:

## 2020-05-30 NOTE — PROGRESS NOTE ADULT - ASSESSMENT
74 Female transferred from  [facility] center to [place] ED with report of sepsis, pneumonia, RYAN, patient on chronic trach collar , recent admission to [facility]  5/4/20 to 5/18/20 for hypoxia, pneumonia, COVID positive 04/27/20 ,adm now 5/28 with septic picture     5/20-Mode: AC/ CMV (Assist Control/ Continuous Mandatory Ventilation), RR (machine): 14, TV (machine): 420, FiO2: 35, PEEP: 5-on ZOSYN/VANCO

## 2020-05-30 NOTE — PROGRESS NOTE ADULT - ASSESSMENT
73yo F with PMH of type 2 DM, quadaplegic, resp failure on chronic vent , htn, peg, CVA, advanced dementia, chronic vaughn, recent admission for +COVID19, d/c to vent facility with neg covid, now with resp distress admitted with sepsis 2/2 PNA    Plan-    Neuro: continue sedation with Precedex, history of seizures 2/2 hypoxia - monitor for seizures, likely pt had a seizure given high lactate which resolved appropriately 2/2 IVF, will consider checking EEG, continue fentanyl patch  Cardio: no active issues, HD stable  Pulm: chronic respiratory failure continue vent support 14/ 420/ 30/ 5, suspected PNA continue abx  ID: continue Zosyn and Vanco for suspected PNA, f/u blood cx, sputum cx, MRSA swab, recheck COVID19 PCR  GI: protonix for GI ppx, start TF  Renal/lytes: no active issues, discontinue LR IVF, lactic acidosis resolving  Endo: sliding scale, HbA1c 7  Heme/Onc: continue heparin for dvt ppx, anemia likely dilutional  Dispo: palliative care consult for MARCI

## 2020-05-30 NOTE — PROGRESS NOTE ADULT - ASSESSMENT
cont rx cont rx    PARASHARAMI BREA  1945 DOA 2020 DR ILIANA KEMP     REVIEW OF SYMPTOMS      Able to give ROS  NO     PHYSICAL EXAM    HEENT Unremarkable PERRLA atraumatic   RESP Fair air entry EXP prolonged    Harsh breath sound Resp distres mild   CARDIAC S1 S2 No S3     NO JVD    ABDOMEN SOFT BS PRESENT NOT DISTENDED No hepatosplenomegaly PEDAL EDEMA present No calf tenderness  NO rash   GENERAL Not TOXIC looking    OXYGENATION        VITALS/LABS       2020 99f 149 160/70 94%   2020 u 660  2020 ac /.3      PT DATA/BEST PRACTICE  ALLERGY         codeine             WT     63 (2020)                                 BMI     23 (2020)                        CrCl                                  ADVANCED DIRECTIVE     HEAD OF BED ELEVATION Yes      INFECTION PPLX     Chlorhexidine 2% ()   Chlorhexidine .12% ()   DVT PROPHYLAXIS   hpsc ()                SQUIRES PROPHYLAXIS    Protonix 40 ()       DYSPHAGIA EVAL     DIET     npo () --> TF Flucerna 1.5 1100 (529)                                                                   IV F    ()   ABIO   zosyn (-)   Vanco 1.2 (-)     COVID PCR  N-vinnie   COVID PCR  N-vinnie     W --2020 W 21-8.9 -7  PROC 2020 PC 2.9  CXR  BL infiltr  MICROBIO  MRSA N-VINNIE  Sp Few GNR  Urine C N-VINNIE  Blod C N-VINNIE     LA -2020 LA 10.5-2.6     D-DIMR 2020 D-dimr 668  DUPLX 2020 V Duplex N-vinnie     Hb -- Hb 11-9.5-9.2     K -2020 K 6-4.6   Cr 5/10- --2020 Cr 1-1.3-.7 -.8    AMS  CT   CT H N-vinnie     ABG   2020 1a ac 14/420/35%/5 730/51/117                               PATIENT SUMMARY     74 f PMH aphasic stroke functional quad dementia GERD trach vent dependent recently hospitalisd [facility] was admitted 2020 for dyspnea possible sepsis Pulm consulted     home meds psyllium feso4 insulin fentanyl 12 glarigine 20 lvnx 40                                          [facility] ADMISSION -[facility]       HOSPITAL COURSE   Infection was ruled out   Possible unwitnessed seizure was felt to be cause of lactocemia and consequent aoc resp failure         PATIENT DATA/ASSESSMENT/RECOMMENDATIONS     RESP DISTRESS POA   Possibly  sec to Seizures vs Ventilator associated pneumonia (WBC Infiltrates)  Improved    MECHANICAL VENT   Monitor abg Target po 90-95% Vent protocol Trach care   Infection ppx  Gas exchange   showed resp acidosis     INFECTION  COVID  PCR N-VINNIE    FEVER POSSIBLE PNEUMONIA POA 2020   HO recent hospital stay   On vent Leukocytosis poa   Vanco zosyn  DCed 2020 as cultures N-VINNIE     LACTICEMIA POA   Cause of LA may have been unwitnessed sz or sepsis   IV fluids and monitor serially  Improved     ELEVATED D-DIMR POA   ELEVATED PTT POA   Was on lovenox pplx at [facility]  2020 Suggest Check  V duplex  W  2020 Check  cta ch     RYAN POA   IV fluids   Improved    ANEMIA   Hb stable     DM   sl scale     RO SEIZURE   CT H N-vinnie     PLAN   Infection felt to be unlikely and abio dced 2020  Suggest ritchie of sl elevated D-dimer eg cta v duplx Monitor abg po                                            TIME SPENT Over 25 minutes aggregate care time spent on encounter; activities included   direct pt care, counseling and/or coordinating care reviewing notes, lab data/ imaging , discussion with multidisciplinary team/ pt /family. Risks, benefits, alternatives  discussed in detail.    CHAPINCITO GUIDRY  1945 DOA 2020 DR ILIANA KEMP

## 2020-05-30 NOTE — PROGRESS NOTE ADULT - SUBJECTIVE AND OBJECTIVE BOX
PROGRESS NOTE  Patient is a 74y old  Female who presents with a chief complaint of RESPIRATORY DISTRESS (30 May 2020 11:14)  Chart and available morning labs /imaging are reviewed electronically , urgent issues addressed . More information  is being added upon completion of rounds , when more information is collected and management discussed with consultants , medical staff and social service/case management on the floor   OVERNIGHT  24 hour events:   no interval events  HPI:  74 Female transferred from  [facility] to [place] ED with report of sepsis, pneumonia, RYAN, patient on chronic trach collar , recent admission to [facility]  20 to 20 for hypoxia, pneumonia, COVID positive 20 ,developed hypoxia and was placed on respiratory support ,required ICU admission with shock and was placed on pressors  .Treated for UTI and pseudomonal pneumonia ,s/p cefepime ,had elevation of D-dimers .CTT was negative for PE , Doppler neg for DVT .Seen by neurologist for sz -like activity ,neurologist impression was that it was related to hypoxia CT Brain was negative for CVA .COVID  tested negative on  and   Patient had been sent to ER for respiratory distress ,associated with hypotension tachycardia ,tachypnea and diaphoresis  .Patient has a hx significant for Advanced dementia  ,Aphasic stroke  ,Constipation  ,COVID-19 virus detected  ,COPD ,Anxiety ,Dysphagia ,hyperkalemia ,pneumonia ,pulmonary edema , CVA (cerebral vascular accident)  ,Dementia of frontal lobe type  ,Diabetes mellitus  ,DM (diabetes mellitus)  ,GERD (gastroesophageal reflux disease)  ,HTN (hypertension)  ,Hypertension  ,Pneumonia  ,Quadriplegia  ,Respiratory failure  ,Feeding by G-tube  ,Gastrostomy in place  ,Hx of appendectomy  ,Tracheostomy in place  ,Tracheostomy tube present, 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 Admitted to ICU ,seen by pulm cons Dr Sandoval on admission to [place] ED ( transferred from [facility])and was admitted to ICU  for septic workup and evaluation ,send blood and urine cx, serial lactate levels ,monitor vitals closely hydration ,monitor urine output and renal profile ,iv abx initiated Palliative care consult requested ,to discuss advance directives and complete MOLST .Patient was diagnosed with sepsis 2/2 to pneumonia and  called ER respiratory and  ICU ,patient  already received Cefepime 2gm iv, vancomycin  1gm iv, rectal Tylenol 650mg, LR x 1 liter, NS x 1 liter in [place] ED .Further plan of care as per pulmonologist Dr Sandoval and icu team (28 May 2020 23:07)  PAST MEDICAL & SURGICAL HISTORY:  Pneumonia  Quadriplegia  COVID-19 virus detected  Advanced dementia  DM (diabetes mellitus)  HTN (hypertension)  CVA (cerebral vascular accident)  Respiratory failure  Constipation  GERD (gastroesophageal reflux disease)  Hypertension  Respiratory failure  Diabetes mellitus  Aphasic stroke  Dementia of frontal lobe type  Feeding by G-tube  Tracheostomy tube present  Tracheostomy in place  Gastrostomy in place  Hx of appendectomy  MEDICATIONS  (STANDING):  chlorhexidine 0.12% Liquid 15 milliLiter(s) Oral Mucosa every 12 hours  chlorhexidine 2% Cloths 1 Application(s) Topical daily  dextrose 5%. 1000 milliLiter(s) (50 mL/Hr) IV Continuous <Continuous>  dextrose 50% Injectable 12.5 Gram(s) IV Push once  dextrose 50% Injectable 25 Gram(s) IV Push once  dextrose 50% Injectable 25 Gram(s) IV Push once  fentaNYL   Patch  12 MICROgram(s)/Hr 1 Patch Transdermal every 72 hours  heparin   Injectable 5000 Unit(s) SubCutaneous every 8 hours  insulin lispro (HumaLOG) corrective regimen sliding scale   SubCutaneous every 6 hours  pantoprazole  Injectable 40 milliGRAM(s) IV Push daily  polyethylene glycol 3350 17 Gram(s) Oral daily  saline laxative (FLEET) Rectal Enema 1 Enema Rectal <User Schedule>  MEDICATIONS  (PRN):  dextrose 40% Gel 15 Gram(s) Oral once PRN Blood Glucose LESS THAN 70 milliGRAM(s)/deciliter  glucagon  Injectable 1 milliGRAM(s) IntraMuscular once PRN Glucose LESS THAN 70 milligrams/deciliter  saline laxative (FLEET) Rectal Enema 1 Enema Rectal daily PRN constipation, to assist in bowel movement  NOTE In accordance with  [facility] policy ICU final medical management decisions/MD orders are  determined/done only by ICU Intensivists   OBJECTIVE  T(C): 37.7 (20 @ 13:00), Max: 37.7 (20 @ 13:00)  HR: 104 (20 @ 15:00) (54 - 149)  BP: 107/53 (20 @ 15:00) (90/51 - 176/79)  RR: 20 (20 @ 15:00) (12 - 32)  SpO2: 100% (20 @ 15:00) (93% - 100%)  Wt(kg): --  I&O's Summary  29 May 2020 07:01  -  30 May 2020 07:00  --------------------------------------------------------  IN: 1825 mL / OUT: 1110 mL / NET: 715 mL  30 May 2020 07:01  -  30 May 2020 15:06  --------------------------------------------------------  IN: 450 mL / OUT: 0 mL / NET: 450 mL  REVIEW OF SYSTEMS :ros is unobtainable due to aphasia   PHYSICAL EXAM  General: NAD  CNS: sedated  HEENT: +trach  Resp: DC BS b/l  CVS: RRR  Abd: soft, NT/ND  Ext: no edema  Skin: warm well perfused  LABS:                        9.2    7.08  )-----------( 214      ( 30 May 2020 04:58 )             30.2     05-30    142  |  108  |  15  ----------------------------<  172<H>  3.7   |  27  |  0.84    Ca    8.5      30 May 2020 04:58  Phos  2.2     05-30  Mg     2.1     -30    TPro  6.7  /  Alb  2.4<L>  /  TBili  0.6  /  DBili  x   /  AST  20  /  ALT  22  /  AlkPhos  77  05-30    CAPILLARY BLOOD GLUCOSE      POCT Blood Glucose.: 239 mg/dL (30 May 2020 12:04)  POCT Blood Glucose.: 229 mg/dL (30 May 2020 05:23)  POCT Blood Glucose.: 175 mg/dL (30 May 2020 00:13)  POCT Blood Glucose.: 181 mg/dL (29 May 2020 18:25)    PT/INR - ( 28 May 2020 19:03 )   PT: 12.7 sec;   INR: 1.14 ratio         PTT - ( 28 May 2020 19:03 )  PTT:50.6 sec  Urinalysis Basic - ( 28 May 2020 19:40 )    Color: Yellow / Appearance: Turbid / S.010 / pH: x  Gluc: x / Ketone: Negative  / Bili: Negative / Urobili: Negative mg/dL   Blood: x / Protein: 30 mg/dL / Nitrite: Negative   Leuk Esterase: Moderate / RBC: 0-2 /HPF / WBC 3-5   Sq Epi: x / Non Sq Epi: Few / Bacteria: Few        Culture - Sputum (collected 29 May 2020 21:16)  Source: .Sputum Sputum  Gram Stain (29 May 2020 22:19):    Few polymorphonuclear leukocytes per low power field    Few Squamous epithelial cells per low power field    Few Gram Negative Rods per oil power field    Culture - Urine (collected 29 May 2020 01:06)  Source: .Urine Catheterized  Final Report (30 May 2020 07:47):    <10,000 CFU/mL Normal Urogenital Elvira    Culture - Blood (collected 29 May 2020 00:35)  Source: .Blood Blood-Peripheral  Preliminary Report (30 May 2020 01:02):    No growth to date.    Culture - Blood (collected 29 May 2020 00:35)  Source: .Blood Blood-Peripheral  Preliminary Report (30 May 2020 01:02):    No growth to date.      RADIOLOGY & ADDITIONAL TESTS:< from: CT Head No Cont (20 @ 21:27) >  EXAM:  CT BRAIN                            PROCEDURE DATE:  2020          INTERPRETATION:  NONCONTRAST CT OF THE BRAIN DATED 2020.    CLINICAL INFORMATION:  Possible seizure    TECHNIQUE: Axial CT images are obtained from the cranial vertex to the skull base without the administration of IV contrast. Images are reformatted in sagittal and coronal planes.    The study is correlated with a prior exam from 2020.    FINDINGS:    Evaluation is degraded by motion. There is no gross acute intra-axial or extra-axial hemorrhage. There is no significant mass effect or shift of the midline. There is marked cerebral volume loss with prominence of the ventricles and sulci. Chronic ischemic changes are seen in the frontoparietal white matter. There are atherosclerotic calcifications of the intracranial carotid arteries.    The regional soft tissues and osseous structures are unremarkable. The visualized paranasal sinuses and tympanic/mastoid cavities are clear apart from aerosolized secretions in the left sphenoid sinus, small mucous retention cyst versus polyp in the right sphenoid sinus, and a right mastoid effusion.    IMPRESSION:    Motion degraded exam shows no gross acute intracranial hemorrhage or mass effect. No significantinterval change compared to the prior exam from 2020.    < end of copied text >     reviewed elctronically  < from: CT Head No Cont (20 @ 21:27) >  EXAM:  CT BRAIN                            PROCEDURE DATE:  2020    < from: Xray Chest 1 View- PORTABLE-Routine (20 @ 08:52) >  Low lung volumes. Tracheostomy cannula reidentified in position. No change heart mediastinum. No significant change bilateral airspace infiltrates. No significant pleural effusion pneumothorax or other new abnormality.    Impression: Essentially stable exam.    < end of copied text >        INTERPRETATION:  NONCONTRAST CT OF THE BRAIN DATED 2020.    CLINICAL INFORMATION:  Possible seizure    TECHNIQUE: Axial CT images are obtained from the cranial vertex to the skull base without the administration of IV contrast. Images are reformatted in sagittal and coronal planes.    The study is correlated with a prior exam from 2020.    FINDINGS:    Evaluation is degraded by motion. There is no gross acute intra-axial or extra-axial hemorrhage. There is no significant mass effect or shift of the midline. There is marked cerebral volume loss with prominence of the ventricles and sulci. Chronic ischemic changes are seen in the frontoparietal white matter. There are atherosclerotic calcifications of the intracranial carotid arteries.    The regional soft tissues and osseous structures are unremarkable. The visualized paranasal sinuses and tympanic/mastoid cavities are clear apart from aerosolized secretions in the left sphenoid sinus, small mucous retention cyst versus polyp in the right sphenoid sinus, and a right mastoid effusion.    IMPRESSION:    Motion degraded exam shows no gross acute intracranial hemorrhage or mass effect. No significantinterval change compared to the prior exam from 2020.    < end of copied text >

## 2020-05-30 NOTE — PROGRESS NOTE ADULT - ATTENDING COMMENTS
74 Female transferred from  [facility] center to [place] ED with report of sepsis, pneumonia, RYAN, patient on chronic trach collar , recent admission to [facility]  5/4/20 to 5/18/20 for hypoxia, pneumonia, COVID positive 04/27/20 ,developed hypoxia and was placed on respiratory support ,required ICU admission with shock and was placed on pressors  .Treated for UTI and pseudomonal pneumonia ,s/p cefepime ,had elevation of D-dimers .CTT was negative for PE , Doppler neg for DVT .Seen by neurologist for sz -like activity ,neurologist impression was that it was related to hypoxia CT Brain was negative for CVA .COVID  tested negative on 05/11 and 05/12  Patient had been sent to ER for respiratory distress ,associated with hypotension tachycardia ,tachypnea and diaphoresis  .Patient has a hx significant for Advanced dementia  ,Aphasic stroke  ,Constipation  ,COVID-19 virus detected  ,COPD ,Anxiety ,Dysphagia ,hyperkalemia ,pneumonia ,pulmonary edema , CVA (cerebral vascular accident)  ,Dementia of frontal lobe type  ,Diabetes mellitus  ,DM (diabetes mellitus)  ,GERD (gastroesophageal reflux disease)  ,HTN (hypertension)  ,Hypertension  ,Pneumonia  ,Quadriplegia  ,Respiratory failure  ,Feeding by G-tube  ,Gastrostomy in place  ,Hx of appendectomy  ,Tracheostomy in place  ,Tracheostomy tube present ,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 Admitted to ICU ,seen by pulm cons Dr Rojelio Sandoval on admission to [place] ED ( transferred from [facility])and was admitted to ICU  for septic workup and evaluation ,send blood and urine cx, serial lactate levels ,monitor vitals closely hydration ,monitor urine output and renal profile ,iv abx initiated . Palliative care consult requested ,to discuss advance directives and complete MOLST .Patient was diagnosed with sepsis 2/2 to pneumonia and  called ER respiratory and  ICU ,patient  already received Cefepime 2gm iv, vancomycin  1gm iv, rectal Tylenol 650mg, LR x 1 liter, NS x 1 liter in [place] ED .Further plan of care as per pulmonologist Dr Sandoval and icu team

## 2020-05-30 NOTE — PROGRESS NOTE ADULT - SUBJECTIVE AND OBJECTIVE BOX
24 hour events:   no interval events    T(F): 98.9 (20 @ 03:32), Max: 99 (20 @ 15:35)  HR: 98 (20 @ 07:00) (53 - 101)  BP: 143/65 (20 @ 07:00) (90/51 - 143/65)  RR: 19 (20 @ 07:00) (12 - 27)  SpO2: 98% (20 @ 07:00) (98% - 100%)  Wt(kg): --    Mode: AC/ CMV (Assist Control/ Continuous Mandatory Ventilation), RR (machine): 14, TV (machine): 420, FiO2: 30, PEEP: 5  20 @ 07:01  -  20 @ 07:00  --------------------------------------------------------  IN: 1825 mL / OUT: 1110 mL / NET: 715 mL        CAPILLARY BLOOD GLUCOSE      POCT Blood Glucose.: 229 mg/dL (30 May 2020 05:23)      I&O's Summary    29 May 2020 07:01  -  30 May 2020 07:00  --------------------------------------------------------  IN: 1825 mL / OUT: 1110 mL / NET: 715 mL        Physical Exam:   Gen:  Neuro:  HEENT:  Resp:  CVS:  Abd:  Ext:  Skin:    Meds:  piperacillin/tazobactam IVPB.. IV Intermittent  vancomycin  IVPB IV Intermittent      dextrose 40% Gel Oral PRN  dextrose 50% Injectable IV Push  dextrose 50% Injectable IV Push  dextrose 50% Injectable IV Push  glucagon  Injectable IntraMuscular PRN  insulin lispro (HumaLOG) corrective regimen sliding scale SubCutaneous      fentaNYL   Patch  12 MICROgram(s)/Hr Transdermal      heparin   Injectable SubCutaneous    pantoprazole  Injectable IV Push      dextrose 5%. IV Continuous  potassium phosphate IVPB IV Intermittent      chlorhexidine 0.12% Liquid Oral Mucosa  chlorhexidine 2% Cloths Topical                              9.2    7.08  )-----------( 214      ( 30 May 2020 04:58 )             30.2       05-30    142  |  108  |  15  ----------------------------<  172<H>  3.7   |  27  |  0.84    Ca    8.5      30 May 2020 04:58  Phos  2.2     05-30  Mg     2.1     -30    TPro  6.7  /  Alb  2.4<L>  /  TBili  0.6  /  DBili  x   /  AST  20  /  ALT  22  /  AlkPhos  77  05-30          PT/INR - ( 28 May 2020 19:03 )   PT: 12.7 sec;   INR: 1.14 ratio         PTT - ( 28 May 2020 19:03 )  PTT:50.6 sec  Urinalysis Basic - ( 28 May 2020 19:40 )    Color: Yellow / Appearance: Turbid / S.010 / pH: x  Gluc: x / Ketone: Negative  / Bili: Negative / Urobili: Negative mg/dL   Blood: x / Protein: 30 mg/dL / Nitrite: Negative   Leuk Esterase: Moderate / RBC: 0-2 /HPF / WBC 3-5   Sq Epi: x / Non Sq Epi: Few / Bacteria: Few      .Sputum Sputum --   Few polymorphonuclear leukocytes per low power field  Few Squamous epithelial cells per low power field  Few Gram Negative Rods per oil power field  @ 21:16  .Urine Catheterized   <10,000 CFU/mL Normal Urogenital Elvira --  @ 01:06  .Blood Blood-Peripheral   No growth to date. --  @ 00:35              Radiology: ***  Bedside ultrasound: ***    CENTRAL LINE: N/Y          DATE INSERTED:              REMOVE: Y/N  REID: N/Y                       DATE INSERTED:              REMOVE: Y/N  A-LINE: N/Y                       DATE INSERTED:              REMOVE: Y/N    GLOBAL ISSUE/BEST PRACTICE:  Analgesia:  Sedation:  CAM-ICU:   HOB elevation: yes  Stress ulcer prophylaxis:  VTE prophylaxis:  Glycemic control:  Nutrition:    CODE STATUS: *** 24 hour events:   no interval events    T(F): 98.9 (20 @ 03:32), Max: 99 (20 @ 15:35)  HR: 98 (20 @ 07:00) (53 - 101)  BP: 143/65 (20 @ 07:00) (90/51 - 143/65)  RR: 19 (20 @ 07:00) (12 - 27)  SpO2: 98% (20 @ 07:00) (98% - 100%)  Wt(kg): --    Mode: AC/ CMV (Assist Control/ Continuous Mandatory Ventilation), RR (machine): 14, TV (machine): 420, FiO2: 30, PEEP: 5  20 @ 07:01  -  20 @ 07:00  --------------------------------------------------------  IN: 1825 mL / OUT: 1110 mL / NET: 715 mL        CAPILLARY BLOOD GLUCOSE      POCT Blood Glucose.: 229 mg/dL (30 May 2020 05:23)      I&O's Summary    29 May 2020 07:01  -  30 May 2020 07:00  --------------------------------------------------------  IN: 1825 mL / OUT: 1110 mL / NET: 715 mL        Physical Exam:   Gen: chronically ill, frail  Neuro: PERRL  Resp: coarse rhonchi b/l  CVS: RRR  Abd: soft, NTND  Ext: no edema   Skin: WWP    Meds:  piperacillin/tazobactam IVPB.. IV Intermittent  vancomycin  IVPB IV Intermittent      dextrose 40% Gel Oral PRN  dextrose 50% Injectable IV Push  dextrose 50% Injectable IV Push  dextrose 50% Injectable IV Push  glucagon  Injectable IntraMuscular PRN  insulin lispro (HumaLOG) corrective regimen sliding scale SubCutaneous      fentaNYL   Patch  12 MICROgram(s)/Hr Transdermal      heparin   Injectable SubCutaneous    pantoprazole  Injectable IV Push      dextrose 5%. IV Continuous  potassium phosphate IVPB IV Intermittent      chlorhexidine 0.12% Liquid Oral Mucosa  chlorhexidine 2% Cloths Topical                              9.2    7.08  )-----------( 214      ( 30 May 2020 04:58 )             30.2       05-30    142  |  108  |  15  ----------------------------<  172<H>  3.7   |  27  |  0.84    Ca    8.5      30 May 2020 04:58  Phos  2.2     30  Mg     2.1     30    TPro  6.7  /  Alb  2.4<L>  /  TBili  0.6  /  DBili  x   /  AST  20  /  ALT  22  /  AlkPhos  77  0530          PT/INR - ( 28 May 2020 19:03 )   PT: 12.7 sec;   INR: 1.14 ratio         PTT - ( 28 May 2020 19:03 )  PTT:50.6 sec  Urinalysis Basic - ( 28 May 2020 19:40 )    Color: Yellow / Appearance: Turbid / S.010 / pH: x  Gluc: x / Ketone: Negative  / Bili: Negative / Urobili: Negative mg/dL   Blood: x / Protein: 30 mg/dL / Nitrite: Negative   Leuk Esterase: Moderate / RBC: 0-2 /HPF / WBC 3-5   Sq Epi: x / Non Sq Epi: Few / Bacteria: Few      .Sputum Sputum --   Few polymorphonuclear leukocytes per low power field  Few Squamous epithelial cells per low power field  Few Gram Negative Rods per oil power field  @ 21:16  .Urine Catheterized   <10,000 CFU/mL Normal Urogenital Elvira --  @ 01:06  .Blood Blood-Peripheral   No growth to date. --  @ 00:35    < from: CT Head No Cont (20 @ 21:27) >  FINDINGS:    Evaluation is degraded by motion. There is no gross acute intra-axial or extra-axial hemorrhage. There is no significant mass effect or shift of the midline. There is marked cerebral volume loss with prominence of the ventricles and sulci. Chronic ischemic changes are seen in the frontoparietal white matter. There are atherosclerotic calcifications of the intracranial carotid arteries.    The regional soft tissues and osseous structures are unremarkable. The visualized paranasal sinuses and tympanic/mastoid cavities are clear apart from aerosolized secretions in the left sphenoid sinus, small mucous retention cyst versus polyp in the right sphenoid sinus, and a right mastoid effusion.    IMPRESSION:    Motion degraded exam shows no gross acute intracranial hemorrhage or mass effect. No significantinterval change compared to the prior exam from 2020.    < end of copied text >      CHRONIC REID    GLOBAL ISSUE/BEST PRACTICE:  Analgesia: N  Sedation: N  CAM-ICU: YULIYA  HOB elevation: yes  Stress ulcer prophylaxis: Y  VTE prophylaxis: Y  Glycemic control: Y  Nutrition: Y    CODE STATUS: FULL

## 2020-05-30 NOTE — PROGRESS NOTE ADULT - SUBJECTIVE AND OBJECTIVE BOX
Date/Time Patient Seen:  		  Referring MD:   Data Reviewed	       Patient is a 74y old  Female who presents with a chief complaint of RESPIRATORY DISTRESS (29 May 2020 19:46)      Subjective/HPI     PAST MEDICAL & SURGICAL HISTORY:  Pneumonia  Quadriplegia  COVID-19 virus detected  Advanced dementia  DM (diabetes mellitus)  HTN (hypertension)  CVA (cerebral vascular accident)  Respiratory failure  Constipation  GERD (gastroesophageal reflux disease)  Hypertension  Respiratory failure  Diabetes mellitus  Aphasic stroke  Dementia of frontal lobe type  Feeding by G-tube  Tracheostomy tube present  Tracheostomy in place  Gastrostomy in place  Hx of appendectomy        Medication list         MEDICATIONS  (STANDING):  chlorhexidine 0.12% Liquid 15 milliLiter(s) Oral Mucosa every 12 hours  chlorhexidine 2% Cloths 1 Application(s) Topical daily  dexMEDEtomidine Infusion 0.5 MICROgram(s)/kG/Hr (7.94 mL/Hr) IV Continuous <Continuous>  dextrose 5%. 1000 milliLiter(s) (50 mL/Hr) IV Continuous <Continuous>  dextrose 50% Injectable 12.5 Gram(s) IV Push once  dextrose 50% Injectable 25 Gram(s) IV Push once  dextrose 50% Injectable 25 Gram(s) IV Push once  fentaNYL   Patch  12 MICROgram(s)/Hr 1 Patch Transdermal every 72 hours  heparin   Injectable 5000 Unit(s) SubCutaneous every 8 hours  insulin lispro (HumaLOG) corrective regimen sliding scale   SubCutaneous every 6 hours  pantoprazole  Injectable 40 milliGRAM(s) IV Push daily  piperacillin/tazobactam IVPB.. 3.375 Gram(s) IV Intermittent every 8 hours  vancomycin  IVPB 1000 milliGRAM(s) IV Intermittent every 12 hours    MEDICATIONS  (PRN):  dextrose 40% Gel 15 Gram(s) Oral once PRN Blood Glucose LESS THAN 70 milliGRAM(s)/deciliter  glucagon  Injectable 1 milliGRAM(s) IntraMuscular once PRN Glucose LESS THAN 70 milligrams/deciliter         Vitals log        ICU Vital Signs Last 24 Hrs  T(C): 37.2 (30 May 2020 03:32), Max: 37.2 (29 May 2020 15:35)  T(F): 98.9 (30 May 2020 03:32), Max: 99 (29 May 2020 15:35)  HR: 98 (30 May 2020 07:00) (53 - 101)  BP: 143/65 (30 May 2020 07:00) (90/51 - 143/65)  BP(mean): 93 (30 May 2020 07:00) (66 - 94)  ABP: --  ABP(mean): --  RR: 19 (30 May 2020 07:00) (12 - 27)  SpO2: 98% (30 May 2020 07:00) (98% - 100%)       Mode: AC/ CMV (Assist Control/ Continuous Mandatory Ventilation)  RR (machine): 14  TV (machine): 420  FiO2: 30  PEEP: 5  ITime: 1  MAP: 14  PIP: 28      Input and Output:  I&O's Detail    29 May 2020 07:01  -  30 May 2020 07:00  --------------------------------------------------------  IN:    dexmedetomidine Infusion: 25 mL    Glucerna 1.5: 500 mL    lactated ringers.: 750 mL    Solution: 300 mL    Solution: 250 mL  Total IN: 1825 mL    OUT:    Indwelling Catheter - Urethral: 1110 mL  Total OUT: 1110 mL    Total NET: 715 mL          Lab Data                        9.2    7.08  )-----------( 214      ( 30 May 2020 04:58 )             30.2     05-30    142  |  108  |  15  ----------------------------<  172<H>  3.7   |  27  |  0.84    Ca    8.5      30 May 2020 04:58  Phos  2.2     05-30  Mg     2.1     05-30    TPro  6.7  /  Alb  2.4<L>  /  TBili  0.6  /  DBili  x   /  AST  20  /  ALT  22  /  AlkPhos  77  05-30    ABG - ( 29 May 2020 01:12 )  pH, Arterial: 7.30  pH, Blood: x     /  pCO2: 51    /  pO2: 117   / HCO3: 23    / Base Excess: -1.3  /  SaO2: 98                      Review of Systems	      Objective     Physical Examination    heart s1s2  lung dec Bs      Pertinent Lab findings & Imaging      Annalise:  NO   Adequate UO     I&O's Detail    29 May 2020 07:01  -  30 May 2020 07:00  --------------------------------------------------------  IN:    dexmedetomidine Infusion: 25 mL    Glucerna 1.5: 500 mL    lactated ringers.: 750 mL    Solution: 300 mL    Solution: 250 mL  Total IN: 1825 mL    OUT:    Indwelling Catheter - Urethral: 1110 mL  Total OUT: 1110 mL    Total NET: 715 mL               Discussed with:     Cultures:	        Radiology

## 2020-05-31 DIAGNOSIS — M62.89 OTHER SPECIFIED DISORDERS OF MUSCLE: ICD-10-CM

## 2020-05-31 DIAGNOSIS — R79.89 OTHER SPECIFIED ABNORMAL FINDINGS OF BLOOD CHEMISTRY: ICD-10-CM

## 2020-05-31 LAB
-  AMIKACIN: SIGNIFICANT CHANGE UP
-  AZTREONAM: SIGNIFICANT CHANGE UP
-  CEFEPIME: SIGNIFICANT CHANGE UP
-  CEFTAZIDIME: SIGNIFICANT CHANGE UP
-  CIPROFLOXACIN: SIGNIFICANT CHANGE UP
-  GENTAMICIN: SIGNIFICANT CHANGE UP
-  IMIPENEM: SIGNIFICANT CHANGE UP
-  LEVOFLOXACIN: SIGNIFICANT CHANGE UP
-  MEROPENEM: SIGNIFICANT CHANGE UP
-  PIPERACILLIN/TAZOBACTAM: SIGNIFICANT CHANGE UP
-  TOBRAMYCIN: SIGNIFICANT CHANGE UP
ALBUMIN SERPL ELPH-MCNC: 2.4 G/DL — LOW (ref 3.3–5)
ALP SERPL-CCNC: 85 U/L — SIGNIFICANT CHANGE UP (ref 40–120)
ALT FLD-CCNC: 19 U/L — SIGNIFICANT CHANGE UP (ref 12–78)
ANION GAP SERPL CALC-SCNC: 5 MMOL/L — SIGNIFICANT CHANGE UP (ref 5–17)
AST SERPL-CCNC: 15 U/L — SIGNIFICANT CHANGE UP (ref 15–37)
BASOPHILS # BLD AUTO: 0.03 K/UL — SIGNIFICANT CHANGE UP (ref 0–0.2)
BASOPHILS NFR BLD AUTO: 0.4 % — SIGNIFICANT CHANGE UP (ref 0–2)
BILIRUB SERPL-MCNC: 0.5 MG/DL — SIGNIFICANT CHANGE UP (ref 0.2–1.2)
BUN SERPL-MCNC: 20 MG/DL — SIGNIFICANT CHANGE UP (ref 7–23)
CALCIUM SERPL-MCNC: 8.3 MG/DL — LOW (ref 8.5–10.1)
CHLORIDE SERPL-SCNC: 111 MMOL/L — HIGH (ref 96–108)
CO2 SERPL-SCNC: 27 MMOL/L — SIGNIFICANT CHANGE UP (ref 22–31)
CREAT SERPL-MCNC: 0.77 MG/DL — SIGNIFICANT CHANGE UP (ref 0.5–1.3)
CULTURE RESULTS: SIGNIFICANT CHANGE UP
EOSINOPHIL # BLD AUTO: 0.1 K/UL — SIGNIFICANT CHANGE UP (ref 0–0.5)
EOSINOPHIL NFR BLD AUTO: 1.5 % — SIGNIFICANT CHANGE UP (ref 0–6)
GLUCOSE SERPL-MCNC: 188 MG/DL — HIGH (ref 70–99)
HCT VFR BLD CALC: 29.2 % — LOW (ref 34.5–45)
HGB BLD-MCNC: 9.2 G/DL — LOW (ref 11.5–15.5)
IMM GRANULOCYTES NFR BLD AUTO: 0.7 % — SIGNIFICANT CHANGE UP (ref 0–1.5)
LYMPHOCYTES # BLD AUTO: 1.12 K/UL — SIGNIFICANT CHANGE UP (ref 1–3.3)
LYMPHOCYTES # BLD AUTO: 16.5 % — SIGNIFICANT CHANGE UP (ref 13–44)
MAGNESIUM SERPL-MCNC: 2.2 MG/DL — SIGNIFICANT CHANGE UP (ref 1.6–2.6)
MCHC RBC-ENTMCNC: 27.7 PG — SIGNIFICANT CHANGE UP (ref 27–34)
MCHC RBC-ENTMCNC: 31.5 GM/DL — LOW (ref 32–36)
MCV RBC AUTO: 88 FL — SIGNIFICANT CHANGE UP (ref 80–100)
MONOCYTES # BLD AUTO: 0.61 K/UL — SIGNIFICANT CHANGE UP (ref 0–0.9)
MONOCYTES NFR BLD AUTO: 9 % — SIGNIFICANT CHANGE UP (ref 2–14)
NEUTROPHILS # BLD AUTO: 4.87 K/UL — SIGNIFICANT CHANGE UP (ref 1.8–7.4)
NEUTROPHILS NFR BLD AUTO: 71.9 % — SIGNIFICANT CHANGE UP (ref 43–77)
NRBC # BLD: 0 /100 WBCS — SIGNIFICANT CHANGE UP (ref 0–0)
ORGANISM # SPEC MICROSCOPIC CNT: SIGNIFICANT CHANGE UP
ORGANISM # SPEC MICROSCOPIC CNT: SIGNIFICANT CHANGE UP
PHOSPHATE SERPL-MCNC: 2.5 MG/DL — SIGNIFICANT CHANGE UP (ref 2.5–4.5)
PLATELET # BLD AUTO: 233 K/UL — SIGNIFICANT CHANGE UP (ref 150–400)
POTASSIUM SERPL-MCNC: 3.8 MMOL/L — SIGNIFICANT CHANGE UP (ref 3.5–5.3)
POTASSIUM SERPL-SCNC: 3.8 MMOL/L — SIGNIFICANT CHANGE UP (ref 3.5–5.3)
PROT SERPL-MCNC: 6.6 G/DL — SIGNIFICANT CHANGE UP (ref 6–8.3)
RBC # BLD: 3.32 M/UL — LOW (ref 3.8–5.2)
RBC # FLD: 15.2 % — HIGH (ref 10.3–14.5)
SODIUM SERPL-SCNC: 143 MMOL/L — SIGNIFICANT CHANGE UP (ref 135–145)
SPECIMEN SOURCE: SIGNIFICANT CHANGE UP
WBC # BLD: 6.78 K/UL — SIGNIFICANT CHANGE UP (ref 3.8–10.5)
WBC # FLD AUTO: 6.78 K/UL — SIGNIFICANT CHANGE UP (ref 3.8–10.5)

## 2020-05-31 PROCEDURE — 99233 SBSQ HOSP IP/OBS HIGH 50: CPT

## 2020-05-31 RX ORDER — ENOXAPARIN SODIUM 100 MG/ML
40 INJECTION SUBCUTANEOUS DAILY
Refills: 0 | Status: DISCONTINUED | OUTPATIENT
Start: 2020-05-31 | End: 2020-06-05

## 2020-05-31 RX ORDER — INSULIN LISPRO 100/ML
VIAL (ML) SUBCUTANEOUS EVERY 6 HOURS
Refills: 0 | Status: DISCONTINUED | OUTPATIENT
Start: 2020-05-31 | End: 2020-06-05

## 2020-05-31 RX ADMIN — FENTANYL CITRATE 1 PATCH: 50 INJECTION INTRAVENOUS at 07:52

## 2020-05-31 RX ADMIN — PANTOPRAZOLE SODIUM 40 MILLIGRAM(S): 20 TABLET, DELAYED RELEASE ORAL at 11:45

## 2020-05-31 RX ADMIN — CHLORHEXIDINE GLUCONATE 15 MILLILITER(S): 213 SOLUTION TOPICAL at 17:10

## 2020-05-31 RX ADMIN — FENTANYL CITRATE 1 PATCH: 50 INJECTION INTRAVENOUS at 21:11

## 2020-05-31 RX ADMIN — Medication 4: at 12:24

## 2020-05-31 RX ADMIN — Medication 4: at 17:39

## 2020-05-31 RX ADMIN — Medication 1: at 05:49

## 2020-05-31 RX ADMIN — ENOXAPARIN SODIUM 40 MILLIGRAM(S): 100 INJECTION SUBCUTANEOUS at 12:26

## 2020-05-31 RX ADMIN — Medication 2: at 00:35

## 2020-05-31 RX ADMIN — HEPARIN SODIUM 5000 UNIT(S): 5000 INJECTION INTRAVENOUS; SUBCUTANEOUS at 05:49

## 2020-05-31 RX ADMIN — CHLORHEXIDINE GLUCONATE 15 MILLILITER(S): 213 SOLUTION TOPICAL at 05:49

## 2020-05-31 RX ADMIN — POLYETHYLENE GLYCOL 3350 17 GRAM(S): 17 POWDER, FOR SOLUTION ORAL at 11:43

## 2020-05-31 RX ADMIN — CHLORHEXIDINE GLUCONATE 1 APPLICATION(S): 213 SOLUTION TOPICAL at 11:44

## 2020-05-31 RX ADMIN — Medication 2: at 23:26

## 2020-05-31 NOTE — PROGRESS NOTE ADULT - PROBLEM SELECTOR PLAN 7
with aphasia and functional quadriplegia , continue home medications ,PT/OT corrective regimen moderate sliding scale

## 2020-05-31 NOTE — PROGRESS NOTE ADULT - PROBLEM SELECTOR PLAN 5
corrective regimen sliding scale Seen by neurologist due to For abnormal stiffening ,as per Dr King - suspect most likely secondary to a hx of underlying neurodegenerative type process with degeneration of the patient's brain parenchyma, causing her to have these abnormal movements x 5 yrs As per  patient  did have EEG prolonged monitoring which recorded these events and they were not seizure activities.  .No need for any antiepileptic medications as per neurologist .

## 2020-05-31 NOTE — PROGRESS NOTE ADULT - PROBLEM SELECTOR PLAN 4
patient was tested positive on 04/27/20 and negative twice on 05/11 and 05/12 ,ID and pulm cons for co-management patient was tested positive on 04/27/20 and negative twice on 05/11 and 05/12 ,ID f/up

## 2020-05-31 NOTE — PROGRESS NOTE ADULT - ASSESSMENT
cont rx cont rx    PARASHARAMI BREA  1945 DOA 2020 DR ILIANA KEMP     REVIEW OF SYMPTOMS      Able to give ROS  NO     PHYSICAL EXAM    HEENT Unremarkable PERRLA atraumatic   RESP Fair air entry EXP prolonged    Harsh breath sound Resp distres mild   CARDIAC S1 S2 No S3     NO JVD    ABDOMEN SOFT BS PRESENT NOT DISTENDED No hepatosplenomegaly PEDAL EDEMA present No calf tenderness  NO rash   GENERAL Not TOXIC looking    OXYGENATION        VITALS/LABS   2020 98 120/60   2020 u 600   2020 ac 14/420//.3   2020 w 6.7 Hb 9.2         PT DATA/BEST PRACTICE  ALLERGY         codeine             WT     63 (2020)                                 BMI     23 (2020)                        CrCl                                  ADVANCED DIRECTIVE     HEAD OF BED ELEVATION Yes      INFECTION PPLX     Chlorhexidine 2% ()   Chlorhexidine .12% ()   DVT PROPHYLAXIS   hpsc ()  --> lvnx 40 ()               SQUIRES PROPHYLAXIS    Protonix 40 ()       DYSPHAGIA EVAL     DIET     npo () --> TF Flucerna 1.5 1100 (529)                                                                   IV F    (-)   ABIO   zosyn (-)   Vanco 1.2 (-)     COVID PCR  N-vinnie   COVID PCR  N-vinnie     W --2020 W 21-8.9 -7  PROC 2020 PC 2.9  CXR  BL infiltr  MICROBIO    MRSA N-VINNIE    Sp Few GNR Pseudomonas    Urine C N-VINNIE  Blod C N-VINNIE     LA --2020 LA 10.5-2.6 - 7.9     D-DIMR 2020 D-dimr 668  DUPLX 2020 V Duplex N-vinnie     Hb -- Hb 11-9.5-9.2     K -2020 K 6-4.6   Cr 5/10- --2020 Cr 1-1.3-.7 -.8    AMS  CT   CT H N-vinnie     ABG   2020 1a ac 14/420/35%/5 730/51/117                               PATIENT SUMMARY     74 f PMH aphasic stroke functional quad dementia GERD trach vent dependent recently hospitalisd [facility] was COVID P+vinnie 2020 was admitted 2020 for dyspnea possible sepsis Pulm consulted     home meds psyllium feso4 insulin fentanyl 12 glarigine 20 lvnx 40                                          [facility] ADMISSION -[facility]         HOSPITAL COURSE   Infection was ruled out   Possible unwitnessed seizure was felt to be cause of lactocemia and consequent aoc resp failure         PATIENT DATA/ASSESSMENT/RECOMMENDATIONS     RESP DISTRESS POA   Possibly  sec to Seizures vs Ventilator associated pneumonia (WBC Infiltrates)  Improved    MECHANICAL VENT   Monitor abg Target po 90-95% Vent protocol Trach care   Infection ppx  Gas exchange   showed resp acidosis     INFECTION  COVID  PCR N-VINNIE    FEVER POSSIBLE PNEUMONIA POA 2020   HO recent hospital stay   On vent Leukocytosis poa   Vanco zosyn  DCed 2020 as cultures N-VINNIE   sputum c showed Pseudomonas This is likely colonization   Dr Olivas on case      LACTICEMIA POA   Cause of LA may have been unwitnessed sz or sepsis   IV fluids and monitor serially       ELEVATED D-DIMR POA   ELEVATED PTT POA   Was on lovenox pplx at [facility]  2020 Suggest Check  V duplex  W  2020 Check  cta ch     RYAN POA   IV fluids   Improved    ANEMIA   Hb stable     DM   sl scale     RO SEIZURE   CT H N-vinnie     PLAN   Infection felt to be unlikely and abio dced 2020  Suggest ritchie of sl elevated D-dimer eg cta v duplx Monitor abg po                                            TIME SPENT Over 25 minutes aggregate care time spent on encounter; activities included   direct pt care, counseling and/or coordinating care reviewing notes, lab data/ imaging , discussion with multidisciplinary team/ pt /family. Risks, benefits, alternatives  discussed in detail.    CHAPINCITO GUIDRY  1945 DOA 2020 DR ILIANA KEMP

## 2020-05-31 NOTE — PROGRESS NOTE ADULT - SUBJECTIVE AND OBJECTIVE BOX
24 hour events:   no interval events    T(F): 99.5 (05-31-20 @ 04:24), Max: 99.8 (05-30-20 @ 13:00)  HR: 89 (05-31-20 @ 07:00) (78 - 149)  BP: 146/65 (05-31-20 @ 07:00) (91/54 - 185/81)  RR: 21 (05-31-20 @ 07:00) (14 - 32)  SpO2: 100% (05-31-20 @ 07:00) (93% - 100%)  Wt(kg): --    Mode: AC/ CMV (Assist Control/ Continuous Mandatory Ventilation), RR (machine): 14, TV (machine): 420, FiO2: 30, PEEP: 5  05-30-20 @ 07:01  -  05-31-20 @ 07:00  --------------------------------------------------------  IN: 1610 mL / OUT: 1400 mL / NET: 210 mL        CAPILLARY BLOOD GLUCOSE      POCT Blood Glucose.: 189 mg/dL (31 May 2020 05:38)      I&O's Summary    30 May 2020 07:01  -  31 May 2020 07:00  --------------------------------------------------------  IN: 1610 mL / OUT: 1400 mL / NET: 210 mL        Physical Exam:   Gen:  Neuro:  HEENT:  Resp:  CVS:  Abd:  Ext:  Skin:    Meds:      dextrose 40% Gel Oral PRN  dextrose 50% Injectable IV Push  dextrose 50% Injectable IV Push  dextrose 50% Injectable IV Push  glucagon  Injectable IntraMuscular PRN  insulin lispro (HumaLOG) corrective regimen sliding scale SubCutaneous      fentaNYL   Patch  12 MICROgram(s)/Hr Transdermal      heparin   Injectable SubCutaneous    pantoprazole  Injectable IV Push  polyethylene glycol 3350 Oral  saline laxative (FLEET) Rectal Enema Rectal  saline laxative (FLEET) Rectal Enema Rectal PRN      dextrose 5%. IV Continuous      chlorhexidine 0.12% Liquid Oral Mucosa  chlorhexidine 2% Cloths Topical                              9.2    6.78  )-----------( 233      ( 31 May 2020 05:55 )             29.2       05-31    143  |  111<H>  |  20  ----------------------------<  188<H>  3.8   |  27  |  0.77    Ca    8.3<L>      31 May 2020 05:55  Phos  2.5     05-31  Mg     2.2     05-31    TPro  6.6  /  Alb  2.4<L>  /  TBili  0.5  /  DBili  x   /  AST  15  /  ALT  19  /  AlkPhos  85  05-31    Lactate 7.9           05-30 @ 13:36              .Sputum Sputum   Few Pseudomonas aeruginosa  Normal Respiratory Elvira present   Few polymorphonuclear leukocytes per low power field  Few Squamous epithelial cells per low power field  Few Gram Negative Rods per oil power field 05-29 @ 21:16  .Urine Catheterized   <10,000 CFU/mL Normal Urogenital Elvira -- 05-29 @ 01:06  .Blood Blood-Peripheral   No growth to date. -- 05-29 @ 00:35              Radiology: ***  Bedside ultrasound: ***    CENTRAL LINE: N/Y          DATE INSERTED:              REMOVE: Y/N  REID: N/Y                       DATE INSERTED:              REMOVE: Y/N  A-LINE: N/Y                       DATE INSERTED:              REMOVE: Y/N    GLOBAL ISSUE/BEST PRACTICE:  Analgesia:  Sedation:  CAM-ICU:   HOB elevation: yes  Stress ulcer prophylaxis:  VTE prophylaxis:  Glycemic control:  Nutrition:    CODE STATUS: *** 24 hour events:   no interval events    T(F): 99.5 (05-31-20 @ 04:24), Max: 99.8 (05-30-20 @ 13:00)  HR: 89 (05-31-20 @ 07:00) (78 - 149)  BP: 146/65 (05-31-20 @ 07:00) (91/54 - 185/81)  RR: 21 (05-31-20 @ 07:00) (14 - 32)  SpO2: 100% (05-31-20 @ 07:00) (93% - 100%)  Wt(kg): --    Mode: AC/ CMV (Assist Control/ Continuous Mandatory Ventilation), RR (machine): 14, TV (machine): 420, FiO2: 30, PEEP: 5  05-30-20 @ 07:01  -  05-31-20 @ 07:00  --------------------------------------------------------  IN: 1610 mL / OUT: 1400 mL / NET: 210 mL        CAPILLARY BLOOD GLUCOSE      POCT Blood Glucose.: 189 mg/dL (31 May 2020 05:38)      I&O's Summary    30 May 2020 07:01  -  31 May 2020 07:00  --------------------------------------------------------  IN: 1610 mL / OUT: 1400 mL / NET: 210 mL        Physical Exam:   Gen: chronically critically ill, trach to vent  Neuro: awake but unresponsive to voice  HEENT: PERRL  Resp: CTABL anteriorly  CVS: RRR  Abd: soft, NTND  Ext: contracted UE b/l  Skin: WWP    Meds:      dextrose 40% Gel Oral PRN  dextrose 50% Injectable IV Push  dextrose 50% Injectable IV Push  dextrose 50% Injectable IV Push  glucagon  Injectable IntraMuscular PRN  insulin lispro (HumaLOG) corrective regimen sliding scale SubCutaneous      fentaNYL   Patch  12 MICROgram(s)/Hr Transdermal      heparin   Injectable SubCutaneous    pantoprazole  Injectable IV Push  polyethylene glycol 3350 Oral  saline laxative (FLEET) Rectal Enema Rectal  saline laxative (FLEET) Rectal Enema Rectal PRN      dextrose 5%. IV Continuous      chlorhexidine 0.12% Liquid Oral Mucosa  chlorhexidine 2% Cloths Topical                              9.2    6.78  )-----------( 233      ( 31 May 2020 05:55 )             29.2       05-31    143  |  111<H>  |  20  ----------------------------<  188<H>  3.8   |  27  |  0.77    Ca    8.3<L>      31 May 2020 05:55  Phos  2.5     05-31  Mg     2.2     05-31    TPro  6.6  /  Alb  2.4<L>  /  TBili  0.5  /  DBili  x   /  AST  15  /  ALT  19  /  AlkPhos  85  05-31    Lactate 7.9           05-30 @ 13:36        .Sputum Sputum   Few Pseudomonas aeruginosa  Normal Respiratory Elvira present   Few polymorphonuclear leukocytes per low power field  Few Squamous epithelial cells per low power field  Few Gram Negative Rods per oil power field 05-29 @ 21:16  .Urine Catheterized   <10,000 CFU/mL Normal Urogenital Elvira -- 05-29 @ 01:06  .Blood Blood-Peripheral   No growth to date. -- 05-29 @ 00:35      CHRONIC REID      GLOBAL ISSUE/BEST PRACTICE:  Analgesia: N  Sedation: N  CAM-ICU: YULIYA  HOB elevation: yes  Stress ulcer prophylaxis: Y  VTE prophylaxis: Y  Glycemic control: Y  Nutrition: Y    CODE STATUS: FULL

## 2020-05-31 NOTE — PROGRESS NOTE ADULT - ATTENDING COMMENTS
74 Female transferred from  [facility] center to [place] ED with report of sepsis, pneumonia, RYAN, patient on chronic trach collar , recent admission to [facility]  5/4/20 to 5/18/20 for hypoxia, pneumonia, COVID positive 04/27/20 ,developed hypoxia and was placed on respiratory support ,required ICU admission with shock and was placed on pressors  .Treated for UTI and pseudomonal pneumonia ,s/p cefepime ,had elevation of D-dimers .CTT was negative for PE , Doppler neg for DVT .Seen by neurologist for sz -like activity ,neurologist impression was that it was related to hypoxia CT Brain was negative for CVA .COVID  tested negative on 05/11 and 05/12  Patient had been sent to ER for respiratory distress ,associated with hypotension tachycardia ,tachypnea and diaphoresis  .Patient has a hx significant for Advanced dementia  ,Aphasic stroke  ,Constipation  ,COVID-19 virus detected  ,COPD ,Anxiety ,Dysphagia ,hyperkalemia ,pneumonia ,pulmonary edema , CVA (cerebral vascular accident)  ,Dementia of frontal lobe type  ,Diabetes mellitus  ,DM (diabetes mellitus)  ,GERD (gastroesophageal reflux disease)  ,HTN (hypertension)  ,Hypertension  ,Pneumonia  ,Quadriplegia  ,Respiratory failure  ,Feeding by G-tube  ,Gastrostomy in place  ,Hx of appendectomy  ,Tracheostomy in place  ,Tracheostomy tube present ,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 Admitted to ICU ,seen by pulm cons Dr Rojelio Sandoval on admission to [place] ED ( transferred from [facility])and was admitted to ICU  for septic workup and evaluation ,send blood and urine cx, serial lactate levels ,monitor vitals closely hydration ,monitor urine output and renal profile ,iv abx initiated . Palliative care consult requested ,to discuss advance directives and complete MOLST .Patient was diagnosed with sepsis 2/2 to pneumonia and  called ER respiratory and  ICU ,patient  already received Cefepime 2gm iv, vancomycin  1gm iv, rectal Tylenol 650mg, LR x 1 liter, NS x 1 liter in [place] ED .Further plan of care as per pulmonologist Dr Sandoval and icu team

## 2020-05-31 NOTE — PROGRESS NOTE ADULT - ASSESSMENT
74 Female transferred from  [facility] center to [place] ED with report of sepsis, pneumonia, RYAN, patient on chronic trach collar , recent admission to [facility]  5/4/20 to 5/18/20 for hypoxia, pneumonia, COVID positive 04/27/20 ,developed hypoxia and was placed on respiratory support ,required ICU admission with shock and was placed on pressors  .Treated for UTI and pseudomonal pneumonia ,s/p cefepime ,had elevation of D-dimers .CTT was negative for PE , Doppler neg for DVT .Seen by neurologist for sz -like activity ,neurologist impression was that it was related to hypoxia CT Brain was negative for CVA .COVID  tested negative on 05/11 and 05/12  Patient had been sent to ER for respiratory distress ,associated with hypotension tachycardia ,tachypnea and diaphoresis  .Patient has a hx significant for Advanced dementia  ,Aphasic stroke  ,Constipation  ,COVID-19 virus detected  ,COPD ,Anxiety ,Dysphagia ,hyperkalemia ,pneumonia ,pulmonary edema , CVA (cerebral vascular accident)  ,Dementia of frontal lobe type  ,Diabetes mellitus  ,DM (diabetes mellitus)  ,GERD (gastroesophageal reflux disease)  ,HTN (hypertension)  ,Hypertension  ,Pneumonia  ,Quadriplegia  ,Respiratory failure  ,Feeding by G-tube  ,Gastrostomy in place  ,Hx of appendectomy  ,Tracheostomy in place  ,Tracheostomy tube present ,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 Admitted to ICU ,seen by pulm cons Dr Sandoval on admission to [place] ED ( transferred from [facility])and was admitted to ICU  for septic workup and evaluation ,send blood and urine cx, serial lactate levels ,monitor vitals closely hydration ,monitor urine output and renal profile ,iv abx initiated Palliative care consult requested ,to discuss advance directives and complete MOLST Patient was diagnosed with sepsis 2/2 to pneumonia and  called ER respiratory and  ICU ,patient  already received Cefepime 2gm iv, vancomycin  1gm iv, rectal Tylenol 650mg, LR x 1 liter, NS x 1 liter in [place] ED .Further plan of care as per pulmonologist Dr Sandoval and icu team

## 2020-05-31 NOTE — PROGRESS NOTE ADULT - PROBLEM SELECTOR PLAN 3
2/2 to probable  pneumonia ,poa -was on vanco and zosyn ,seen by ID CONSULT-  low suspicion at this point for infectious process , observe off abx. Currently low suspicion of sepsis and no sz events was determined by neurologist ,continue monitoring serum lactate levels ,IV hydration prn

## 2020-05-31 NOTE — PROGRESS NOTE ADULT - SUBJECTIVE AND OBJECTIVE BOX
BREADIOMEDES BECKHAM    [facility] ICU1 02    Patient is a 74y old  Female who presents with a chief complaint of RESPIRATORY DISTRESS (31 May 2020 11:32)       Allergies    codeine (Hives)    Intolerances        HPI:  74 Female transferred from  [facility] to [place] ED with report of sepsis, pneumonia, RYAN, patient on chronic trach collar , recent admission to [facility]  5/4/20 to 5/18/20 for hypoxia, pneumonia, COVID positive 04/27/20 ,developed hypoxia and was placed on respiratory support ,required ICU admission with shock and was placed on pressors  .Treated for UTI and pseudomonal pneumonia ,s/p cefepime ,had elevation of D-dimers .CTT was negative for PE , Doppler neg for DVT .Seen by neurologist for sz -like activity ,neurologist impression was that it was related to hypoxia CT Brain was negative for CVA .COVID  tested negative on 05/11 and 05/12  Patient had been sent to ER for respiratory distress ,associated with hypotension tachycardia ,tachypnea and diaphoresis  .Patient has a hx significant for Advanced dementia  ,Aphasic stroke  ,Constipation  ,COVID-19 virus detected  ,COPD ,Anxiety ,Dysphagia ,hyperkalemia ,pneumonia ,pulmonary edema , CVA (cerebral vascular accident)  ,Dementia of frontal lobe type  ,Diabetes mellitus  ,DM (diabetes mellitus)  ,GERD (gastroesophageal reflux disease)  ,HTN (hypertension)  ,Hypertension  ,Pneumonia  ,Quadriplegia  ,Respiratory failure  ,Feeding by G-tube  ,Gastrostomy in place  ,Hx of appendectomy  ,Tracheostomy in place  ,Tracheostomy tube present, 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 Admitted to ICU ,seen by pulm cons Dr Sandoval on admission to [place] ED ( transferred from [facility])and was admitted to ICU  for septic workup and evaluation ,send blood and urine cx, serial lactate levels ,monitor vitals closely hydration ,monitor urine output and renal profile ,iv abx initiated Palliative care consult requested ,to discuss advance directives and complete MOLST .Patient was diagnosed with sepsis 2/2 to pneumonia and  called ER respiratory and  ICU ,patient  already received Cefepime 2gm iv, vancomycin  1gm iv, rectal Tylenol 650mg, LR x 1 liter, NS x 1 liter in [place] ED .Further plan of care as per pulmonologist Dr Sandoval and icu team (28 May 2020 23:07)      PAST MEDICAL & SURGICAL HISTORY:  Pneumonia  Quadriplegia  COVID-19 virus detected  Advanced dementia  DM (diabetes mellitus)  HTN (hypertension)  CVA (cerebral vascular accident)  Respiratory failure  Constipation  GERD (gastroesophageal reflux disease)  Hypertension  Respiratory failure  Diabetes mellitus  Aphasic stroke  Dementia of frontal lobe type  Feeding by G-tube  Tracheostomy tube present  Tracheostomy in place  Gastrostomy in place  Hx of appendectomy      FAMILY HISTORY:  No pertinent family history in first degree relatives        MEDICATIONS   chlorhexidine 0.12% Liquid 15 milliLiter(s) Oral Mucosa every 12 hours  chlorhexidine 2% Cloths 1 Application(s) Topical daily  dextrose 40% Gel 15 Gram(s) Oral once PRN  dextrose 5%. 1000 milliLiter(s) IV Continuous <Continuous>  dextrose 50% Injectable 12.5 Gram(s) IV Push once  dextrose 50% Injectable 25 Gram(s) IV Push once  dextrose 50% Injectable 25 Gram(s) IV Push once  enoxaparin Injectable 40 milliGRAM(s) SubCutaneous daily  fentaNYL   Patch  12 MICROgram(s)/Hr 1 Patch Transdermal every 72 hours  glucagon  Injectable 1 milliGRAM(s) IntraMuscular once PRN  insulin lispro (HumaLOG) corrective regimen sliding scale   SubCutaneous every 6 hours  pantoprazole  Injectable 40 milliGRAM(s) IV Push daily  polyethylene glycol 3350 17 Gram(s) Oral daily  saline laxative (FLEET) Rectal Enema 1 Enema Rectal <User Schedule>  saline laxative (FLEET) Rectal Enema 1 Enema Rectal daily PRN      Vital Signs Last 24 Hrs  T(C): 37.3 (31 May 2020 12:00), Max: 37.9 (31 May 2020 07:22)  T(F): 99.2 (31 May 2020 12:00), Max: 100.2 (31 May 2020 07:22)  HR: 91 (31 May 2020 12:00) (78 - 149)  BP: 159/69 (31 May 2020 12:00) (91/54 - 185/81)  BP(mean): 99 (31 May 2020 12:00) (71 - 117)  RR: 20 (31 May 2020 12:00) (14 - 32)  SpO2: 99% (31 May 2020 12:00) (93% - 100%)      05-30-20 @ 07:01  -  05-31-20 @ 07:00  --------------------------------------------------------  IN: 1610 mL / OUT: 1400 mL / NET: 210 mL    05-31-20 @ 07:01  -  05-31-20 @ 12:36  --------------------------------------------------------  IN: 250 mL / OUT: 0 mL / NET: 250 mL        Mode: AC/ CMV (Assist Control/ Continuous Mandatory Ventilation), RR (machine): 14, TV (machine): 420, FiO2: 30, PEEP: 5, ITime: 1, MAP: 14, PIP: 27    LABS:                        9.2    6.78  )-----------( 233      ( 31 May 2020 05:55 )             29.2     05-31    143  |  111<H>  |  20  ----------------------------<  188<H>  3.8   |  27  |  0.77    Ca    8.3<L>      31 May 2020 05:55  Phos  2.5     05-31  Mg     2.2     05-31    TPro  6.6  /  Alb  2.4<L>  /  TBili  0.5  /  DBili  x   /  AST  15  /  ALT  19  /  AlkPhos  85  05-31              WBC:  WBC Count: 6.78 K/uL (05-31 @ 05:55)  WBC Count: 7.08 K/uL (05-30 @ 04:58)  WBC Count: 8.90 K/uL (05-29 @ 05:51)  WBC Count: 21.68 K/uL (05-28 @ 19:02)      MICROBIOLOGY:  RECENT CULTURES:  05-29 .Sputum Sputum Pseudomonas aeruginosa   Few polymorphonuclear leukocytes per low power field  Few Squamous epithelial cells per low power field  Few Gram Negative Rods per oil power field   Few Pseudomonas aeruginosa  Normal Respiratory Elvira present    05-29 .Urine Catheterized XXXX XXXX   <10,000 CFU/mL Normal Urogenital Elvira    05-29 .Blood Blood-Peripheral XXXX XXXX   No growth to date.                    Sodium:  Sodium, Serum: 143 mmol/L (05-31 @ 05:55)  Sodium, Serum: 142 mmol/L (05-30 @ 04:58)  Sodium, Serum: 142 mmol/L (05-29 @ 05:51)  Sodium, Serum: 140 mmol/L (05-28 @ 23:45)  Sodium, Serum: 139 mmol/L (05-28 @ 19:02)      0.77 mg/dL 05-31 @ 05:55  0.84 mg/dL 05-30 @ 04:58  0.77 mg/dL 05-29 @ 05:51  0.83 mg/dL 05-28 @ 23:45  1.37 mg/dL 05-28 @ 19:02      Hemoglobin:  Hemoglobin: 9.2 g/dL (05-31 @ 05:55)  Hemoglobin: 9.2 g/dL (05-30 @ 04:58)  Hemoglobin: 9.6 g/dL (05-29 @ 05:51)  Hemoglobin: 11.9 g/dL (05-28 @ 19:02)      Platelets: Platelet Count - Automated: 233 K/uL (05-31 @ 05:55)  Platelet Count - Automated: 214 K/uL (05-30 @ 04:58)  Platelet Count - Automated: 206 K/uL (05-29 @ 05:51)  Platelet Count - Automated: 386 K/uL (05-28 @ 19:02)      LIVER FUNCTIONS - ( 31 May 2020 05:55 )  Alb: 2.4 g/dL / Pro: 6.6 g/dL / ALK PHOS: 85 U/L / ALT: 19 U/L / AST: 15 U/L / GGT: x                 RADIOLOGY & ADDITIONAL STUDIES:

## 2020-05-31 NOTE — PROGRESS NOTE ADULT - SUBJECTIVE AND OBJECTIVE BOX
infectious diseases progress note:    BREA BECKHAM is a 74y y. o. Female patient    No concerning overnight events    Allergies    codeine (Hives)    Intolerances        ANTIBIOTICS/RELEVANT:  antimicrobials    immunologic:    OTHER:  chlorhexidine 0.12% Liquid 15 milliLiter(s) Oral Mucosa every 12 hours  chlorhexidine 2% Cloths 1 Application(s) Topical daily  dextrose 40% Gel 15 Gram(s) Oral once PRN  dextrose 5%. 1000 milliLiter(s) IV Continuous <Continuous>  dextrose 50% Injectable 12.5 Gram(s) IV Push once  dextrose 50% Injectable 25 Gram(s) IV Push once  dextrose 50% Injectable 25 Gram(s) IV Push once  fentaNYL   Patch  12 MICROgram(s)/Hr 1 Patch Transdermal every 72 hours  glucagon  Injectable 1 milliGRAM(s) IntraMuscular once PRN  heparin   Injectable 5000 Unit(s) SubCutaneous every 8 hours  insulin lispro (HumaLOG) corrective regimen sliding scale   SubCutaneous every 6 hours  pantoprazole  Injectable 40 milliGRAM(s) IV Push daily  polyethylene glycol 3350 17 Gram(s) Oral daily  saline laxative (FLEET) Rectal Enema 1 Enema Rectal <User Schedule>  saline laxative (FLEET) Rectal Enema 1 Enema Rectal daily PRN      Objective:  Vital Signs Last 24 Hrs  T(C): 37.9 (31 May 2020 07:22), Max: 37.9 (31 May 2020 07:22)  T(F): 100.2 (31 May 2020 07:22), Max: 100.2 (31 May 2020 07:22)  HR: 91 (31 May 2020 09:00) (78 - 149)  BP: 153/65 (31 May 2020 09:00) (91/54 - 185/81)  BP(mean): 94 (31 May 2020 09:00) (71 - 117)  RR: 17 (31 May 2020 09:00) (14 - 32)  SpO2: 98% (31 May 2020 09:00) (93% - 100%)    T(C): 37.9 (05-31-20 @ 07:22), Max: 37.9 (05-31-20 @ 07:22)  T(C): 37.9 (05-31-20 @ 07:22), Max: 38.3 (05-28-20 @ 18:32)  T(C): 37.9 (05-31-20 @ 07:22), Max: 38.3 (05-28-20 @ 18:32)    PHYSICAL EXAM: on vent via trach  HEENT: NC atraumatic  Neck: supple  Respiratory: no accessory muscle use, breathing comfortably, upper airway sounds  Cardiovascular: distant  Gastrointestinal: normal appearing, nondistended  Extremities: no clubbing, no cyanosis,  NEuro: sedated      LABS:                          9.2    6.78  )-----------( 233      ( 31 May 2020 05:55 )             29.2       6.78 05-31 @ 05:55  7.08 05-30 @ 04:58  8.90 05-29 @ 05:51  21.68 05-28 @ 19:02      05-31    143  |  111<H>  |  20  ----------------------------<  188<H>  3.8   |  27  |  0.77    Ca    8.3<L>      31 May 2020 05:55  Phos  2.5     05-31  Mg     2.2     05-31    TPro  6.6  /  Alb  2.4<L>  /  TBili  0.5  /  DBili  x   /  AST  15  /  ALT  19  /  AlkPhos  85  05-31      Creatinine, Serum: 0.77 mg/dL (05-31-20 @ 05:55)  Creatinine, Serum: 0.84 mg/dL (05-30-20 @ 04:58)  Creatinine, Serum: 0.77 mg/dL (05-29-20 @ 05:51)  Creatinine, Serum: 0.83 mg/dL (05-28-20 @ 23:45)  Creatinine, Serum: 1.37 mg/dL (05-28-20 @ 19:02)                INFLAMMATORY MARKERS  Auto Neutrophil #: 4.87 K/uL (05-31-20 @ 05:55)  Auto Lymphocyte #: 1.12 K/uL (05-31-20 @ 05:55)  Auto Neutrophil #: 4.92 K/uL (05-30-20 @ 04:58)  Auto Lymphocyte #: 1.07 K/uL (05-30-20 @ 04:58)  Auto Neutrophil #: 16.51 K/uL (05-28-20 @ 19:02)  Auto Lymphocyte #: 3.62 K/uL (05-28-20 @ 19:02)    Lactate, Blood: 7.9 mmol/L (05-30-20 @ 13:36)  Lactate, Blood: 2.6 mmol/L (05-28-20 @ 23:45)  Lactate, Blood: 10.5 mmol/L (05-28-20 @ 20:35)  Lactate, Blood: 13.3 mmol/L (05-28-20 @ 19:02)    Auto Eosinophil #: 0.10 K/uL (05-31-20 @ 05:55)  Auto Eosinophil #: 0.26 K/uL (05-30-20 @ 04:58)  Auto Eosinophil #: 0.17 K/uL (05-28-20 @ 19:02)        Procalcitonin, Serum: 2.92 ng/mL (05-29-20 @ 00:17)            Ferritin, Serum: 266 ng/mL (05-28-20 @ 23:52)        Activated Partial Thromboplastin Time: 50.6 sec (05-28-20 @ 19:03)  INR: 1.14 ratio (05-28-20 @ 19:03)    D-Dimer Assay, Quantitative: 668 ng/mL DDU (05-28-20 @ 19:03)        MICROBIOLOGY:              RADIOLOGY & ADDITIONAL STUDIES:

## 2020-05-31 NOTE — PROGRESS NOTE ADULT - SUBJECTIVE AND OBJECTIVE BOX
Date/Time Patient Seen:  		  Referring MD:   Data Reviewed	       Patient is a 74y old  Female who presents with a chief complaint of RESPIRATORY DISTRESS (30 May 2020 11:14)      Subjective/HPI     PAST MEDICAL & SURGICAL HISTORY:  Pneumonia  Quadriplegia  COVID-19 virus detected  Advanced dementia  DM (diabetes mellitus)  HTN (hypertension)  CVA (cerebral vascular accident)  Respiratory failure  Constipation  GERD (gastroesophageal reflux disease)  Hypertension  Respiratory failure  Diabetes mellitus  Aphasic stroke  Dementia of frontal lobe type  Feeding by G-tube  Tracheostomy tube present  Tracheostomy in place  Gastrostomy in place  Hx of appendectomy        Medication list         MEDICATIONS  (STANDING):  chlorhexidine 0.12% Liquid 15 milliLiter(s) Oral Mucosa every 12 hours  chlorhexidine 2% Cloths 1 Application(s) Topical daily  dextrose 5%. 1000 milliLiter(s) (50 mL/Hr) IV Continuous <Continuous>  dextrose 50% Injectable 12.5 Gram(s) IV Push once  dextrose 50% Injectable 25 Gram(s) IV Push once  dextrose 50% Injectable 25 Gram(s) IV Push once  fentaNYL   Patch  12 MICROgram(s)/Hr 1 Patch Transdermal every 72 hours  heparin   Injectable 5000 Unit(s) SubCutaneous every 8 hours  insulin lispro (HumaLOG) corrective regimen sliding scale   SubCutaneous every 6 hours  pantoprazole  Injectable 40 milliGRAM(s) IV Push daily  polyethylene glycol 3350 17 Gram(s) Oral daily  saline laxative (FLEET) Rectal Enema 1 Enema Rectal <User Schedule>    MEDICATIONS  (PRN):  dextrose 40% Gel 15 Gram(s) Oral once PRN Blood Glucose LESS THAN 70 milliGRAM(s)/deciliter  glucagon  Injectable 1 milliGRAM(s) IntraMuscular once PRN Glucose LESS THAN 70 milligrams/deciliter  saline laxative (FLEET) Rectal Enema 1 Enema Rectal daily PRN constipation, to assist in bowel movement         Vitals log        ICU Vital Signs Last 24 Hrs  T(C): 37.5 (31 May 2020 04:24), Max: 37.7 (30 May 2020 13:00)  T(F): 99.5 (31 May 2020 04:24), Max: 99.8 (30 May 2020 13:00)  HR: 89 (31 May 2020 07:00) (78 - 149)  BP: 159/70 (31 May 2020 06:00) (91/54 - 185/81)  BP(mean): 101 (31 May 2020 06:00) (71 - 117)  ABP: --  ABP(mean): --  RR: 21 (31 May 2020 07:00) (14 - 32)  SpO2: 100% (31 May 2020 07:00) (93% - 100%)       Mode: AC/ CMV (Assist Control/ Continuous Mandatory Ventilation)  RR (machine): 14  TV (machine): 420  FiO2: 30  PEEP: 5  ITime: 1  MAP: 12  PIP: 30      Input and Output:  I&O's Detail    30 May 2020 07:01  -  31 May 2020 07:00  --------------------------------------------------------  IN:    Enteral Tube Flush: 160 mL    Glucerna 1.5: 1200 mL    Solution: 250 mL  Total IN: 1610 mL    OUT:    Indwelling Catheter - Urethral: 1400 mL  Total OUT: 1400 mL    Total NET: 210 mL          Lab Data                        9.2    6.78  )-----------( 233      ( 31 May 2020 05:55 )             29.2     05-31    143  |  111<H>  |  20  ----------------------------<  188<H>  3.8   |  27  |  0.77    Ca    8.3<L>      31 May 2020 05:55  Phos  2.5     05-31  Mg     2.2     05-31    TPro  6.6  /  Alb  2.4<L>  /  TBili  0.5  /  DBili  x   /  AST  15  /  ALT  19  /  AlkPhos  85  05-31            Review of Systems	      Objective     Physical Examination    heart s1s2  lung dec BS  trach and peg      Pertinent Lab findings & Imaging      Woody:  NO   Adequate UO     I&O's Detail    30 May 2020 07:01  -  31 May 2020 07:00  --------------------------------------------------------  IN:    Enteral Tube Flush: 160 mL    Glucerna 1.5: 1200 mL    Solution: 250 mL  Total IN: 1610 mL    OUT:    Indwelling Catheter - Urethral: 1400 mL  Total OUT: 1400 mL    Total NET: 210 mL               Discussed with:     Cultures:	        Radiology

## 2020-05-31 NOTE — PROGRESS NOTE ADULT - PROBLEM SELECTOR PLAN 2
radiographically stable exam ,bilateral infiltrates likely chronic 2/2 to probable  pneumonia ,poa -was on vanco and zosyn ,seen by ID CONSULT-  low suspicion at this point for infectious process , observe off abx.

## 2020-05-31 NOTE — PROGRESS NOTE ADULT - SUBJECTIVE AND OBJECTIVE BOX
Patient is a 74y old  Female who presents with a chief complaint of RESPIRATORY DISTRESS (31 May 2020 12:36)    PAST MEDICAL & SURGICAL HISTORY:  Pneumonia  Quadriplegia  COVID-19 virus detected  Advanced dementia  DM (diabetes mellitus)  HTN (hypertension)  CVA (cerebral vascular accident)  Respiratory failure  Constipation  GERD (gastroesophageal reflux disease)  Hypertension  Respiratory failure  Diabetes mellitus  Aphasic stroke  Dementia of frontal lobe type  Feeding by G-tube  Tracheostomy tube present  Tracheostomy in place  Gastrostomy in place  Hx of appendectomy    BREA BECKHAM   74y    Female    BRIEF HOSPITAL COURSE:    Review of Systems:   UATO demented                     All other ROS are negative.    Allergies    codeine (Hives)    Intolerances          ICU Vital Signs Last 24 Hrs  T(C): 37.2 (31 May 2020 20:30), Max: 37.9 (31 May 2020 07:22)  T(F): 99 (31 May 2020 20:30), Max: 100.2 (31 May 2020 07:22)  HR: 85 (31 May 2020 21:00) (82 - 135)  BP: 130/62 (31 May 2020 21:00) (121/98 - 175/76)  BP(mean): 89 (31 May 2020 21:00) (87 - 141)  ABP: --  ABP(mean): --  RR: 14 (31 May 2020 21:00) (14 - 29)  SpO2: 99% (31 May 2020 21:00) (93% - 100%)    Physical Examination:    General: NAD    HEENT: Trach site clean     PULM: bilateral BS     CVS: s1 s2 reg    ABD: soft +PEG    EXT: contracted     SKIN:     Neuro: eyes open no commands       Mode: AC/ CMV (Assist Control/ Continuous Mandatory Ventilation)  RR (machine): 14  TV (machine): 420  FiO2: 30  PEEP: 5  ITime: 1  MAP: 11  PIP: 29    Mode: AC/ CMV (Assist Control/ Continuous Mandatory Ventilation), RR (machine): 14, TV (machine): 420, FiO2: 30, PEEP: 5, ITime: 1, MAP: 11, PIP: 29  LABS:                        9.2    6.78  )-----------( 233      ( 31 May 2020 05:55 )             29.2     05-31    143  |  111<H>  |  20  ----------------------------<  188<H>  3.8   |  27  |  0.77    Ca    8.3<L>      31 May 2020 05:55  Phos  2.5     05-31  Mg     2.2     05-31    TPro  6.6  /  Alb  2.4<L>  /  TBili  0.5  /  DBili  x   /  AST  15  /  ALT  19  /  AlkPhos  85  05-31          CAPILLARY BLOOD GLUCOSE      POCT Blood Glucose.: 211 mg/dL (31 May 2020 17:15)  POCT Blood Glucose.: 218 mg/dL (31 May 2020 11:39)  POCT Blood Glucose.: 189 mg/dL (31 May 2020 05:38)  POCT Blood Glucose.: 208 mg/dL (31 May 2020 00:27)        CULTURES:  Culture Results:   Few Pseudomonas aeruginosa  Normal Respiratory Elvira present (05-29 @ 21:16)  Culture Results:   <10,000 CFU/mL Normal Urogenital Elvira (05-29 @ 01:06)  Culture Results:   No growth to date. (05-29 @ 00:35)  Culture Results:   No growth to date. (05-29 @ 00:35)      Medications:  MEDICATIONS  (STANDING):  chlorhexidine 0.12% Liquid 15 milliLiter(s) Oral Mucosa every 12 hours  chlorhexidine 2% Cloths 1 Application(s) Topical daily  dextrose 5%. 1000 milliLiter(s) (50 mL/Hr) IV Continuous <Continuous>  dextrose 50% Injectable 12.5 Gram(s) IV Push once  dextrose 50% Injectable 25 Gram(s) IV Push once  dextrose 50% Injectable 25 Gram(s) IV Push once  enoxaparin Injectable 40 milliGRAM(s) SubCutaneous daily  fentaNYL   Patch  12 MICROgram(s)/Hr 1 Patch Transdermal every 72 hours  insulin lispro (HumaLOG) corrective regimen sliding scale   SubCutaneous every 6 hours  pantoprazole  Injectable 40 milliGRAM(s) IV Push daily  polyethylene glycol 3350 17 Gram(s) Oral daily  saline laxative (FLEET) Rectal Enema 1 Enema Rectal <User Schedule>    MEDICATIONS  (PRN):  dextrose 40% Gel 15 Gram(s) Oral once PRN Blood Glucose LESS THAN 70 milliGRAM(s)/deciliter  glucagon  Injectable 1 milliGRAM(s) IntraMuscular once PRN Glucose LESS THAN 70 milligrams/deciliter  saline laxative (FLEET) Rectal Enema 1 Enema Rectal daily PRN constipation, to assist in bowel movement        05-30 @ 07:01 - 05-31 @ 07:00  --------------------------------------------------------  IN: 1610 mL / OUT: 1400 mL / NET: 210 mL    05-31 @ 07:01 - 05-31 @ 21:15  --------------------------------------------------------  IN: 600 mL / OUT: 500 mL / NET: 100 mL        RADIOLOGY/IMAGING/ECHO    reviewed,      Assessment/Plan:    74 F dementia, chronic vent dependent respiratory failure,  chroic vaughn  Recent COVID  ++ AB bedbound presenting with respiratory distress, diaphoresis and generalized spasm of extremities.  She was found to be febrile, hypertensive, tachycardic, tachypneic with severe lactic acidosis and leukocytosis  Concern for sz activity.    Extensive W/U in past for  sz including 3 day EEG.      Cultures Sputum pseudomonas colonizer, other cultures negative ABX stopped  WBC normal.  CXR with residual post COVID infiltrates.      Lactate up and down prolactin elevated,  ?? episodic sz not seen even on long duration EEG, neuro following no further episodes.    Otherwise stable for transfer back to SNF

## 2020-05-31 NOTE — PROGRESS NOTE ADULT - SUBJECTIVE AND OBJECTIVE BOX
PROGRESS NOTE  Patient is a 74y old  Female who presents with a chief complaint of RESPIRATORY DISTRESS (31 May 2020 07:16)  Chart and available morning labs /imaging are reviewed electronically , urgent issues addressed . More information  is being added upon completion of rounds , when more information is collected and management discussed with consultants , medical staff and social service/case management on the floor     OVERNIGHT      HPI:  74 Female transferred from  [facility] to [facility] with report of sepsis, pneumonia, RYAN, patient on chronic trach collar , recent admission to [facility]  5/4/20 to 5/18/20 for hypoxia, pneumonia, COVID positive 04/27/20 ,developed hypoxia and was placed on respiratory support ,required ICU admission with shock and was placed on pressors  .Treated for UTI and pseudomonal pneumonia ,s/p cefepime ,had elevation of D-dimers .CTT was negative for PE , Doppler neg for DVT .Seen by neurologist for sz -like activity ,neurologist impression was that it was related to hypoxia CT Brain was negative for CVA .COVID  tested negative on 05/11 and 05/12  Patient had been sent to ER for respiratory distress ,associated with hypotension tachycardia ,tachypnea and diaphoresis  .Patient has a hx significant for Advanced dementia  ,Aphasic stroke  ,Constipation  ,COVID-19 virus detected  ,COPD ,Anxiety ,Dysphagia ,hyperkalemia ,pneumonia ,pulmonary edema , CVA (cerebral vascular accident)  ,Dementia of frontal lobe type  ,Diabetes mellitus  ,DM (diabetes mellitus)  ,GERD (gastroesophageal reflux disease)  ,HTN (hypertension)  ,Hypertension  ,Pneumonia  ,Quadriplegia  ,Respiratory failure  ,Feeding by G-tube  ,Gastrostomy in place  ,Hx of appendectomy  ,Tracheostomy in place  ,Tracheostomy tube present, 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 Admitted to ICU ,seen by pulm cons Dr Sandoval on admission to [place] [facility] ( transferred from [facility])and was admitted to ICU  for septic workup and evaluation ,send blood and urine cx, serial lactate levels ,monitor vitals closely hydration ,monitor urine output and renal profile ,iv abx initiated Palliative care consult requested ,to discuss advance directives and complete MOLST .Patient was diagnosed with sepsis 2/2 to pneumonia and  called ER respiratory and  ICU ,patient  already received Cefepime 2gm iv, vancomycin  1gm iv, rectal Tylenol 650mg, LR x 1 liter, NS x 1 liter in [facility] .Further plan of care as per pulmonologist Dr Sandoval and icu team (28 May 2020 23:07)    PAST MEDICAL & SURGICAL HISTORY:  Pneumonia  Quadriplegia  COVID-19 virus detected  Advanced dementia  DM (diabetes mellitus)  HTN (hypertension)  CVA (cerebral vascular accident)  Respiratory failure  Constipation  GERD (gastroesophageal reflux disease)  Hypertension  Respiratory failure  Diabetes mellitus  Aphasic stroke  Dementia of frontal lobe type  Feeding by G-tube  Tracheostomy tube present  Tracheostomy in place  Gastrostomy in place  Hx of appendectomy      MEDICATIONS  (STANDING):  chlorhexidine 0.12% Liquid 15 milliLiter(s) Oral Mucosa every 12 hours  chlorhexidine 2% Cloths 1 Application(s) Topical daily  dextrose 5%. 1000 milliLiter(s) (50 mL/Hr) IV Continuous <Continuous>  dextrose 50% Injectable 12.5 Gram(s) IV Push once  dextrose 50% Injectable 25 Gram(s) IV Push once  dextrose 50% Injectable 25 Gram(s) IV Push once  fentaNYL   Patch  12 MICROgram(s)/Hr 1 Patch Transdermal every 72 hours  heparin   Injectable 5000 Unit(s) SubCutaneous every 8 hours  insulin lispro (HumaLOG) corrective regimen sliding scale   SubCutaneous every 6 hours  pantoprazole  Injectable 40 milliGRAM(s) IV Push daily  polyethylene glycol 3350 17 Gram(s) Oral daily  saline laxative (FLEET) Rectal Enema 1 Enema Rectal <User Schedule>    MEDICATIONS  (PRN):  dextrose 40% Gel 15 Gram(s) Oral once PRN Blood Glucose LESS THAN 70 milliGRAM(s)/deciliter  glucagon  Injectable 1 milliGRAM(s) IntraMuscular once PRN Glucose LESS THAN 70 milligrams/deciliter  saline laxative (FLEET) Rectal Enema 1 Enema Rectal daily PRN constipation, to assist in bowel movement      OBJECTIVE    T(C): 37.9 (05-31-20 @ 07:22), Max: 37.9 (05-31-20 @ 07:22)  HR: 89 (05-31-20 @ 07:22) (78 - 149)  BP: 146/65 (05-31-20 @ 07:00) (91/54 - 185/81)  RR: 18 (05-31-20 @ 07:22) (14 - 32)  SpO2: 99% (05-31-20 @ 07:22) (93% - 100%)  Wt(kg): --  I&O's Summary    30 May 2020 07:01  -  31 May 2020 07:00  --------------------------------------------------------  IN: 1610 mL / OUT: 1400 mL / NET: 210 mL          REVIEW OF SYSTEMS:      PHYSICAL EXAM:  Appearance: NAD. VS past 24 hrs -as above   HEENT:   Moist oral mucosa. Conjunctiva clear b/l.   Neck : supple  Respiratory: Lungs CTAB.  Gastrointestinal:  Soft, nontender. No rebound. No rigidity. BS present	  Cardiovascular: RRR ,S1S2 present  Neurologic: Non-focal. Moving all extremities.  Extremities: No edema. No erythema. No calf tenderness.  Skin: No rashes, No ecchymoses, No cyanosis.	  wounds ,skin lesions-See skin assesment flow sheet   LABS:                        9.2    6.78  )-----------( 233      ( 31 May 2020 05:55 )             29.2     05-31    143  |  111<H>  |  20  ----------------------------<  188<H>  3.8   |  27  |  0.77    Ca    8.3<L>      31 May 2020 05:55  Phos  2.5     05-31  Mg     2.2     05-31    TPro  6.6  /  Alb  2.4<L>  /  TBili  0.5  /  DBili  x   /  AST  15  /  ALT  19  /  AlkPhos  85  05-31    CAPILLARY BLOOD GLUCOSE      POCT Blood Glucose.: 189 mg/dL (31 May 2020 05:38)  POCT Blood Glucose.: 208 mg/dL (31 May 2020 00:27)  POCT Blood Glucose.: 220 mg/dL (30 May 2020 18:07)  POCT Blood Glucose.: 239 mg/dL (30 May 2020 12:04)          Culture - Sputum (collected 29 May 2020 21:16)  Source: .Sputum Sputum  Gram Stain (29 May 2020 22:19):    Few polymorphonuclear leukocytes per low power field    Few Squamous epithelial cells per low power field    Few Gram Negative Rods per oil power field  Preliminary Report (30 May 2020 17:33):    Few Pseudomonas aeruginosa    Normal Respiratory Elvira present  Organism: Pseudomonas aeruginosa (30 May 2020 17:33)  Organism: Pseudomonas aeruginosa (30 May 2020 17:33)    Culture - Urine (collected 29 May 2020 01:06)  Source: .Urine Catheterized  Final Report (30 May 2020 07:47):    <10,000 CFU/mL Normal Urogenital Elvira    Culture - Blood (collected 29 May 2020 00:35)  Source: .Blood Blood-Peripheral  Preliminary Report (30 May 2020 01:02):    No growth to date.    Culture - Blood (collected 29 May 2020 00:35)  Source: .Blood Blood-Peripheral  Preliminary Report (30 May 2020 01:02):    No growth to date.      RADIOLOGY & ADDITIONAL TESTS:   reviewed elctronically  ASSESSMENT/PLAN: PROGRESS NOTE  Patient is a 74y old  Female who presents with a chief complaint of RESPIRATORY DISTRESS (31 May 2020 07:16)  Chart and available morning labs /imaging are reviewed electronically , urgent issues addressed . More information  is being added upon completion of rounds , when more information is collected and management discussed with consultants , medical staff and social service/case management on the floor     OVERNIGHT  24 hour events:   no interval events    HPI:  74 Female transferred from  [facility] to [facility] with report of sepsis, pneumonia, RYAN, patient on chronic trach collar , recent admission to [facility]  5/4/20 to 5/18/20 for hypoxia, pneumonia, COVID positive 04/27/20 ,developed hypoxia and was placed on respiratory support ,required ICU admission with shock and was placed on pressors  .Treated for UTI and pseudomonal pneumonia ,s/p cefepime ,had elevation of D-dimers .CTT was negative for PE , Doppler neg for DVT .Seen by neurologist for sz -like activity ,neurologist impression was that it was related to hypoxia CT Brain was negative for CVA .COVID  tested negative on 05/11 and 05/12  Patient had been sent to ER for respiratory distress ,associated with hypotension tachycardia ,tachypnea and diaphoresis  .Patient has a hx significant for Advanced dementia  ,Aphasic stroke  ,Constipation  ,COVID-19 virus detected  ,COPD ,Anxiety ,Dysphagia ,hyperkalemia ,pneumonia ,pulmonary edema , CVA (cerebral vascular accident)  ,Dementia of frontal lobe type  ,Diabetes mellitus  ,DM (diabetes mellitus)  ,GERD (gastroesophageal reflux disease)  ,HTN (hypertension)  ,Hypertension  ,Pneumonia  ,Quadriplegia  ,Respiratory failure  ,Feeding by G-tube  ,Gastrostomy in place  ,Hx of appendectomy  ,Tracheostomy in place  ,Tracheostomy tube present, 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 Admitted to ICU ,seen by pulm cons Dr Sandoval on admission to [place] ED ( transferred from [facility])and was admitted to ICU  for septic workup and evaluation ,send blood and urine cx, serial lactate levels ,monitor vitals closely hydration ,monitor urine output and renal profile ,iv abx initiated Palliative care consult requested ,to discuss advance directives and complete MOLST .Patient was diagnosed with sepsis 2/2 to pneumonia and  called ER respiratory and  ICU ,patient  already received Cefepime 2gm iv, vancomycin  1gm iv, rectal Tylenol 650mg, LR x 1 liter, NS x 1 liter in [facility] .Further plan of care as per pulmonologist Dr Sandoval and icu team (28 May 2020 23:07)    PAST MEDICAL & SURGICAL HISTORY:  Pneumonia  Quadriplegia  COVID-19 virus detected  Advanced dementia  DM (diabetes mellitus)  HTN (hypertension)  CVA (cerebral vascular accident)  Respiratory failure  Constipation  GERD (gastroesophageal reflux disease)  Hypertension  Respiratory failure  Diabetes mellitus  Aphasic stroke  Dementia of frontal lobe type  Feeding by G-tube  Tracheostomy tube present  Tracheostomy in place  Gastrostomy in place  Hx of appendectomy      MEDICATIONS  (STANDING):  chlorhexidine 0.12% Liquid 15 milliLiter(s) Oral Mucosa every 12 hours  chlorhexidine 2% Cloths 1 Application(s) Topical daily  dextrose 5%. 1000 milliLiter(s) (50 mL/Hr) IV Continuous <Continuous>  dextrose 50% Injectable 12.5 Gram(s) IV Push once  dextrose 50% Injectable 25 Gram(s) IV Push once  dextrose 50% Injectable 25 Gram(s) IV Push once  fentaNYL   Patch  12 MICROgram(s)/Hr 1 Patch Transdermal every 72 hours  heparin   Injectable 5000 Unit(s) SubCutaneous every 8 hours  insulin lispro (HumaLOG) corrective regimen sliding scale   SubCutaneous every 6 hours  pantoprazole  Injectable 40 milliGRAM(s) IV Push daily  polyethylene glycol 3350 17 Gram(s) Oral daily  saline laxative (FLEET) Rectal Enema 1 Enema Rectal <User Schedule>    MEDICATIONS  (PRN):  dextrose 40% Gel 15 Gram(s) Oral once PRN Blood Glucose LESS THAN 70 milliGRAM(s)/deciliter  glucagon  Injectable 1 milliGRAM(s) IntraMuscular once PRN Glucose LESS THAN 70 milligrams/deciliter  saline laxative (FLEET) Rectal Enema 1 Enema Rectal daily PRN constipation, to assist in bowel movement      OBJECTIVE    T(C): 37.9 (05-31-20 @ 07:22), Max: 37.9 (05-31-20 @ 07:22)  HR: 89 (05-31-20 @ 07:22) (78 - 149)  BP: 146/65 (05-31-20 @ 07:00) (91/54 - 185/81)  RR: 18 (05-31-20 @ 07:22) (14 - 32)  SpO2: 99% (05-31-20 @ 07:22) (93% - 100%)  Wt(kg): --  I&O's Summary    30 May 2020 07:01  -  31 May 2020 07:00  --------------------------------------------------------  IN: 1610 mL / OUT: 1400 mL / NET: 210 mL          REVIEW OF SYSTEMS:  REVIEW OF SYSTEMS :ros is unobtainable due to aphasia   PHYSICAL EXAM  General: NAD  HEENT: +trach  Resp: DC BS b/l  CVS: RRR  Abd: soft, NT/ND peg  Ext: no edema  Skin: warm well perfused      LABS:                        9.2    6.78  )-----------( 233      ( 31 May 2020 05:55 )             29.2     05-31    143  |  111<H>  |  20  ----------------------------<  188<H>  3.8   |  27  |  0.77    Ca    8.3<L>      31 May 2020 05:55  Phos  2.5     05-31  Mg     2.2     05-31    TPro  6.6  /  Alb  2.4<L>  /  TBili  0.5  /  DBili  x   /  AST  15  /  ALT  19  /  AlkPhos  85  05-31    CAPILLARY BLOOD GLUCOSE      POCT Blood Glucose.: 189 mg/dL (31 May 2020 05:38)  POCT Blood Glucose.: 208 mg/dL (31 May 2020 00:27)  POCT Blood Glucose.: 220 mg/dL (30 May 2020 18:07)  POCT Blood Glucose.: 239 mg/dL (30 May 2020 12:04)          Culture - Sputum (collected 29 May 2020 21:16)  Source: .Sputum Sputum  Gram Stain (29 May 2020 22:19):    Few polymorphonuclear leukocytes per low power field    Few Squamous epithelial cells per low power field    Few Gram Negative Rods per oil power field  Preliminary Report (30 May 2020 17:33):    Few Pseudomonas aeruginosa    Normal Respiratory Elvira present  Organism: Pseudomonas aeruginosa (30 May 2020 17:33)  Organism: Pseudomonas aeruginosa (30 May 2020 17:33)    Culture - Urine (collected 29 May 2020 01:06)  Source: .Urine Catheterized  Final Report (30 May 2020 07:47):    <10,000 CFU/mL Normal Urogenital Elvira    Culture - Blood (collected 29 May 2020 00:35)  Source: .Blood Blood-Peripheral  Preliminary Report (30 May 2020 01:02):    No growth to date.    Culture - Blood (collected 29 May 2020 00:35)  Source: .Blood Blood-Peripheral  Preliminary Report (30 May 2020 01:02):    No growth to date.      RADIOLOGY & ADDITIONAL TESTS:   reviewed elctronically  ASSESSMENT/PLAN:

## 2020-05-31 NOTE — PROGRESS NOTE ADULT - ATTENDING COMMENTS
73 yo woman with frontotemporal dementia, chronic vent dependent respiratory failure, bedbound presenting with respiratory distress, diaphoresis and generalized spasm of extremities.  She was found to be febrile, hypertensive, tachycardic, tachypneic with severe lactic acidosis.  Concern for sz activity.    --no current evidence of sz activity  appreciate neuro eval  --hemodynamically stable  --chronic vent dependent respiratory failure  continue full vent support  --normal renal function  --dysphagia, continue TF  chronic constipation, continue bowel regimen  --trach aspirate Cx with few pseudomonas, likely colonizer rather than true infection  monitor off Abx  --hyperglycemia, increase to moderate insulin coverage scale  --chronic vaughn

## 2020-05-31 NOTE — PROGRESS NOTE ADULT - SUBJECTIVE AND OBJECTIVE BOX
Neurology follow up note    BREA [id number]      Interval History:    Patient resting in bed    MEDICATIONS    chlorhexidine 0.12% Liquid 15 milliLiter(s) Oral Mucosa every 12 hours  chlorhexidine 2% Cloths 1 Application(s) Topical daily  dextrose 40% Gel 15 Gram(s) Oral once PRN  dextrose 5%. 1000 milliLiter(s) IV Continuous <Continuous>  dextrose 50% Injectable 12.5 Gram(s) IV Push once  dextrose 50% Injectable 25 Gram(s) IV Push once  dextrose 50% Injectable 25 Gram(s) IV Push once  fentaNYL   Patch  12 MICROgram(s)/Hr 1 Patch Transdermal every 72 hours  glucagon  Injectable 1 milliGRAM(s) IntraMuscular once PRN  heparin   Injectable 5000 Unit(s) SubCutaneous every 8 hours  insulin lispro (HumaLOG) corrective regimen sliding scale   SubCutaneous every 6 hours  pantoprazole  Injectable 40 milliGRAM(s) IV Push daily  polyethylene glycol 3350 17 Gram(s) Oral daily  saline laxative (FLEET) Rectal Enema 1 Enema Rectal <User Schedule>  saline laxative (FLEET) Rectal Enema 1 Enema Rectal daily PRN      Allergies    codeine (Hives)    Intolerances            Vital Signs Last 24 Hrs  T(C): 37.9 (31 May 2020 07:22), Max: 37.9 (31 May 2020 07:22)  T(F): 100.2 (31 May 2020 07:22), Max: 100.2 (31 May 2020 07:22)  HR: 87 (31 May 2020 10:00) (78 - 149)  BP: 142/65 (31 May 2020 10:00) (91/54 - 185/81)  BP(mean): 93 (31 May 2020 10:00) (71 - 117)  RR: 19 (31 May 2020 10:00) (14 - 32)  SpO2: 99% (31 May 2020 10:00) (93% - 100%)    REVIEW OF SYSTEMS:  Could not be obtained secondary to the patient being nonverbal.  As per my conversation with the spouse, a gradual process along with underlying strokes causing her to become bed-bound.    PHYSICAL EXAMINATION:    Head:  Normocephalic, atraumatic.  Eyes:  No scleral icterus.  Ears:  Cannot be evaluated secondary to the patient being nonverbal.  NECK:  Tracheostomy was in place.  RESPIRATORY:  Good air entry bilaterally.  CARDIOVASCULAR:  S1 and S2 heard.  ABDOMEN:  Soft and nontender.  EXTREMITIES:  No clubbing or cyanosis were noted.      NEUROLOGIC:  The patient was awake in bed.  Upon stimulating the patient, her eyes did roll up.  Her body started to stiffen with abnormal mouth movements, lasted one to two minutes.  Pupils bilaterally were 3 mm, reactive to 2 mm.  The patient has her arms in a flexed position with both hands contracted.  Bilateral lower extremities were in a straight position.  The patient has increased tone, bilateral upper and lower extremities.             LABS:  CBC Full  -  ( 31 May 2020 05:55 )  WBC Count : 6.78 K/uL  RBC Count : 3.32 M/uL  Hemoglobin : 9.2 g/dL  Hematocrit : 29.2 %  Platelet Count - Automated : 233 K/uL  Mean Cell Volume : 88.0 fl  Mean Cell Hemoglobin : 27.7 pg  Mean Cell Hemoglobin Concentration : 31.5 gm/dL  Auto Neutrophil # : 4.87 K/uL  Auto Lymphocyte # : 1.12 K/uL  Auto Monocyte # : 0.61 K/uL  Auto Eosinophil # : 0.10 K/uL  Auto Basophil # : 0.03 K/uL  Auto Neutrophil % : 71.9 %  Auto Lymphocyte % : 16.5 %  Auto Monocyte % : 9.0 %  Auto Eosinophil % : 1.5 %  Auto Basophil % : 0.4 %      05-31    143  |  111<H>  |  20  ----------------------------<  188<H>  3.8   |  27  |  0.77    Ca    8.3<L>      31 May 2020 05:55  Phos  2.5     05-31  Mg     2.2     05-31    TPro  6.6  /  Alb  2.4<L>  /  TBili  0.5  /  DBili  x   /  AST  15  /  ALT  19  /  AlkPhos  85  05-31    Hemoglobin A1C:     LIVER FUNCTIONS - ( 31 May 2020 05:55 )  Alb: 2.4 g/dL / Pro: 6.6 g/dL / ALK PHOS: 85 U/L / ALT: 19 U/L / AST: 15 U/L / GGT: x           Vitamin B12         RADIOLOGY    ANALYSIS AND PLAN:  A 74-year-old with abnormal movements.  1.	For abnormal stiffening, suspect most likely secondary to a history of underlying neurodegenerative type process with degeneration of the patient's brain parenchyma, causing her to have these abnormal movements.  As per my conversation with the spouse, this has been going on for over five years.  She has had over 200 of these events, and as per my conversation with him, he did have EEG prolonged monitoring which recorded these events, they were not seizure activities.  2.	No need for any antiepileptic medications.  3.	For respiratory failure, monitor respiratory status.  4.	Monitor the patient's systolic blood pressure.  5.	Monitor electrolytes.  6.	Spoke with the spouse in great detail, Marciano.  7.	From neurology standpoint only, the patient is stable.  8.	Advanced care directives were discussed with the spouse.  9.	spoke to spouse about possible medications to add medications for these movement he is refusing     Greater than 45 minutes spent in direct patient care reviewing  the notes, lab data/ imaging , discussion with multidisciplinary team.

## 2020-05-31 NOTE — PROGRESS NOTE ADULT - NSHPATTENDINGPLANDISCUSS_GEN_ALL_CORE
, ,  , ,[facility] FACILITY PCP , ,   , , [facility] FACILITY PCP

## 2020-05-31 NOTE — PROGRESS NOTE ADULT - PROBLEM SELECTOR PLAN 6
Repeat K serum level ,monitor electrolytes closely , ins/outs ,serial bmp with aphasia and functional quadriplegia , continue home medications ,PT/OT

## 2020-06-01 LAB
ALBUMIN SERPL ELPH-MCNC: 2.7 G/DL — LOW (ref 3.3–5)
ALP SERPL-CCNC: 90 U/L — SIGNIFICANT CHANGE UP (ref 40–120)
ALT FLD-CCNC: 18 U/L — SIGNIFICANT CHANGE UP (ref 12–78)
ANION GAP SERPL CALC-SCNC: 6 MMOL/L — SIGNIFICANT CHANGE UP (ref 5–17)
AST SERPL-CCNC: 16 U/L — SIGNIFICANT CHANGE UP (ref 15–37)
BASOPHILS # BLD AUTO: 0.06 K/UL — SIGNIFICANT CHANGE UP (ref 0–0.2)
BASOPHILS NFR BLD AUTO: 0.7 % — SIGNIFICANT CHANGE UP (ref 0–2)
BILIRUB DIRECT SERPL-MCNC: <.1 MG/DL — SIGNIFICANT CHANGE UP (ref 0.05–0.2)
BILIRUB INDIRECT FLD-MCNC: >0.4 MG/DL — SIGNIFICANT CHANGE UP (ref 0.2–1)
BILIRUB SERPL-MCNC: 0.5 MG/DL — SIGNIFICANT CHANGE UP (ref 0.2–1.2)
BUN SERPL-MCNC: 26 MG/DL — HIGH (ref 7–23)
CALCIUM SERPL-MCNC: 8.8 MG/DL — SIGNIFICANT CHANGE UP (ref 8.5–10.1)
CHLORIDE SERPL-SCNC: 111 MMOL/L — HIGH (ref 96–108)
CO2 SERPL-SCNC: 27 MMOL/L — SIGNIFICANT CHANGE UP (ref 22–31)
CREAT SERPL-MCNC: 0.88 MG/DL — SIGNIFICANT CHANGE UP (ref 0.5–1.3)
EOSINOPHIL # BLD AUTO: 0.05 K/UL — SIGNIFICANT CHANGE UP (ref 0–0.5)
EOSINOPHIL NFR BLD AUTO: 0.6 % — SIGNIFICANT CHANGE UP (ref 0–6)
GLUCOSE SERPL-MCNC: 246 MG/DL — HIGH (ref 70–99)
HCT VFR BLD CALC: 32.2 % — LOW (ref 34.5–45)
HGB BLD-MCNC: 9.8 G/DL — LOW (ref 11.5–15.5)
IMM GRANULOCYTES NFR BLD AUTO: 1.7 % — HIGH (ref 0–1.5)
LYMPHOCYTES # BLD AUTO: 1.12 K/UL — SIGNIFICANT CHANGE UP (ref 1–3.3)
LYMPHOCYTES # BLD AUTO: 12.9 % — LOW (ref 13–44)
MAGNESIUM SERPL-MCNC: 2.4 MG/DL — SIGNIFICANT CHANGE UP (ref 1.6–2.6)
MCHC RBC-ENTMCNC: 27.1 PG — SIGNIFICANT CHANGE UP (ref 27–34)
MCHC RBC-ENTMCNC: 30.4 GM/DL — LOW (ref 32–36)
MCV RBC AUTO: 89.2 FL — SIGNIFICANT CHANGE UP (ref 80–100)
MONOCYTES # BLD AUTO: 0.58 K/UL — SIGNIFICANT CHANGE UP (ref 0–0.9)
MONOCYTES NFR BLD AUTO: 6.7 % — SIGNIFICANT CHANGE UP (ref 2–14)
NEUTROPHILS # BLD AUTO: 6.74 K/UL — SIGNIFICANT CHANGE UP (ref 1.8–7.4)
NEUTROPHILS NFR BLD AUTO: 77.4 % — HIGH (ref 43–77)
NRBC # BLD: 0 /100 WBCS — SIGNIFICANT CHANGE UP (ref 0–0)
PHOSPHATE SERPL-MCNC: 2.3 MG/DL — LOW (ref 2.5–4.5)
PLATELET # BLD AUTO: 254 K/UL — SIGNIFICANT CHANGE UP (ref 150–400)
POTASSIUM SERPL-MCNC: 4.2 MMOL/L — SIGNIFICANT CHANGE UP (ref 3.5–5.3)
POTASSIUM SERPL-SCNC: 4.2 MMOL/L — SIGNIFICANT CHANGE UP (ref 3.5–5.3)
PREALB SERPL-MCNC: 16 MG/DL — LOW (ref 20–40)
PROT SERPL-MCNC: 7.4 G/DL — SIGNIFICANT CHANGE UP (ref 6–8.3)
RBC # BLD: 3.61 M/UL — LOW (ref 3.8–5.2)
RBC # FLD: 15.3 % — HIGH (ref 10.3–14.5)
S PNEUM AG UR QL: NEGATIVE — SIGNIFICANT CHANGE UP
SODIUM SERPL-SCNC: 144 MMOL/L — SIGNIFICANT CHANGE UP (ref 135–145)
WBC # BLD: 8.7 K/UL — SIGNIFICANT CHANGE UP (ref 3.8–10.5)
WBC # FLD AUTO: 8.7 K/UL — SIGNIFICANT CHANGE UP (ref 3.8–10.5)

## 2020-06-01 PROCEDURE — 99233 SBSQ HOSP IP/OBS HIGH 50: CPT

## 2020-06-01 RX ORDER — POTASSIUM PHOSPHATE, MONOBASIC POTASSIUM PHOSPHATE, DIBASIC 236; 224 MG/ML; MG/ML
15 INJECTION, SOLUTION INTRAVENOUS ONCE
Refills: 0 | Status: COMPLETED | OUTPATIENT
Start: 2020-06-01 | End: 2020-06-01

## 2020-06-01 RX ORDER — INSULIN GLARGINE 100 [IU]/ML
7 INJECTION, SOLUTION SUBCUTANEOUS AT BEDTIME
Refills: 0 | Status: DISCONTINUED | OUTPATIENT
Start: 2020-06-01 | End: 2020-06-02

## 2020-06-01 RX ADMIN — INSULIN GLARGINE 7 UNIT(S): 100 INJECTION, SOLUTION SUBCUTANEOUS at 23:22

## 2020-06-01 RX ADMIN — POLYETHYLENE GLYCOL 3350 17 GRAM(S): 17 POWDER, FOR SOLUTION ORAL at 12:00

## 2020-06-01 RX ADMIN — FENTANYL CITRATE 1 PATCH: 50 INJECTION INTRAVENOUS at 11:58

## 2020-06-01 RX ADMIN — PANTOPRAZOLE SODIUM 40 MILLIGRAM(S): 20 TABLET, DELAYED RELEASE ORAL at 12:03

## 2020-06-01 RX ADMIN — FENTANYL CITRATE 1 PATCH: 50 INJECTION INTRAVENOUS at 23:31

## 2020-06-01 RX ADMIN — FENTANYL CITRATE 1 PATCH: 50 INJECTION INTRAVENOUS at 08:44

## 2020-06-01 RX ADMIN — CHLORHEXIDINE GLUCONATE 1 APPLICATION(S): 213 SOLUTION TOPICAL at 12:02

## 2020-06-01 RX ADMIN — Medication 2: at 23:23

## 2020-06-01 RX ADMIN — Medication 6: at 05:16

## 2020-06-01 RX ADMIN — ENOXAPARIN SODIUM 40 MILLIGRAM(S): 100 INJECTION SUBCUTANEOUS at 11:57

## 2020-06-01 RX ADMIN — Medication 6: at 17:28

## 2020-06-01 RX ADMIN — CHLORHEXIDINE GLUCONATE 15 MILLILITER(S): 213 SOLUTION TOPICAL at 05:19

## 2020-06-01 RX ADMIN — CHLORHEXIDINE GLUCONATE 15 MILLILITER(S): 213 SOLUTION TOPICAL at 17:28

## 2020-06-01 RX ADMIN — FENTANYL CITRATE 1 PATCH: 50 INJECTION INTRAVENOUS at 12:01

## 2020-06-01 RX ADMIN — POTASSIUM PHOSPHATE, MONOBASIC POTASSIUM PHOSPHATE, DIBASIC 62.5 MILLIMOLE(S): 236; 224 INJECTION, SOLUTION INTRAVENOUS at 08:51

## 2020-06-01 RX ADMIN — Medication 4: at 12:00

## 2020-06-01 NOTE — PROGRESS NOTE ADULT - PROBLEM SELECTOR PLAN 2
2/2 to probable  pneumonia ,poa -was on vanco and zosyn ,seen by ID CONSULT-  low suspicion at this point for infectious process , observe off abx.

## 2020-06-01 NOTE — PROGRESS NOTE ADULT - PROBLEM SELECTOR PLAN 5
Seen by neurologist due to For abnormal stiffening ,as per Dr King - suspect most likely secondary to a hx of underlying neurodegenerative type process with degeneration of the patient's brain parenchyma, causing her to have these abnormal movements x 5 yrs As per  patient  did have EEG prolonged monitoring which recorded these events and they were not seizure activities.  .No need for any antiepileptic medications as per neurologist .

## 2020-06-01 NOTE — PROGRESS NOTE ADULT - SUBJECTIVE AND OBJECTIVE BOX
PROGRESS NOTE  Patient is a 74y old  Female who presents with a chief complaint of RESPIRATORY DISTRESS (01 Jun 2020 12:59)  Chart and available morning labs /imaging are reviewed electronically , urgent issues addressed . More information  is being added upon completion of rounds , when more information is collected and management discussed with consultants , medical staff and social service/case management on the floor   OVERNIGHT  24 hour events: No acute events overnight.  HPI:  74 Female transferred from  [facility] to [place] ED with report of sepsis, pneumonia, RYAN, patient on chronic trach collar , recent admission to [facility]  5/4/20 to 5/18/20 for hypoxia, pneumonia, COVID positive 04/27/20 ,developed hypoxia and was placed on respiratory support ,required ICU admission with shock and was placed on pressors  .Treated for UTI and pseudomonal pneumonia ,s/p cefepime ,had elevation of D-dimers .CTT was negative for PE , Doppler neg for DVT .Seen by neurologist for sz -like activity ,neurologist impression was that it was related to hypoxia CT Brain was negative for CVA .COVID  tested negative on 05/11 and 05/12  Patient had been sent to ER for respiratory distress ,associated with hypotension tachycardia ,tachypnea and diaphoresis  .Patient has a hx significant for Advanced dementia  ,Aphasic stroke  ,Constipation  ,COVID-19 virus detected  ,COPD ,Anxiety ,Dysphagia ,hyperkalemia ,pneumonia ,pulmonary edema , CVA (cerebral vascular accident)  ,Dementia of frontal lobe type  ,Diabetes mellitus  ,DM (diabetes mellitus)  ,GERD (gastroesophageal reflux disease)  ,HTN (hypertension)  ,Hypertension  ,Pneumonia  ,Quadriplegia  ,Respiratory failure  ,Feeding by G-tube  ,Gastrostomy in place  ,Hx of appendectomy  ,Tracheostomy in place  ,Tracheostomy tube present, 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 Admitted to ICU ,seen by pulm cons Dr Sandoval on admission to [place] ED ( transferred from [facility])and was admitted to ICU  for septic workup and evaluation ,send blood and urine cx, serial lactate levels ,monitor vitals closely hydration ,monitor urine output and renal profile ,iv abx initiated Palliative care consult requested ,to discuss advance directives and complete MOLST .Patient was diagnosed with sepsis 2/2 to pneumonia and  called ER respiratory and  ICU ,patient  already received Cefepime 2gm iv, vancomycin  1gm iv, rectal Tylenol 650mg, LR x 1 liter, NS x 1 liter in [place] ED .Further plan of care as per pulmonologist Dr Sandoval and icu team (28 May 2020 23:07)  PAST MEDICAL & SURGICAL HISTORY:  Pneumonia  Quadriplegia  COVID-19 virus detected  Advanced dementia  DM (diabetes mellitus)  HTN (hypertension)  CVA (cerebral vascular accident)  Respiratory failure  Constipation  GERD (gastroesophageal reflux disease)  Hypertension  Respiratory failure  Diabetes mellitus  Aphasic stroke  Dementia of frontal lobe type  Feeding by G-tube  Tracheostomy tube present  Tracheostomy in place  Gastrostomy in place  Hx of appendectomy  MEDICATIONS  (STANDING):  chlorhexidine 0.12% Liquid 15 milliLiter(s) Oral Mucosa every 12 hours  chlorhexidine 2% Cloths 1 Application(s) Topical daily  dextrose 5%. 1000 milliLiter(s) (50 mL/Hr) IV Continuous <Continuous>  dextrose 50% Injectable 12.5 Gram(s) IV Push once  dextrose 50% Injectable 25 Gram(s) IV Push once  dextrose 50% Injectable 25 Gram(s) IV Push once  enoxaparin Injectable 40 milliGRAM(s) SubCutaneous daily  fentaNYL   Patch  12 MICROgram(s)/Hr 1 Patch Transdermal every 72 hours  insulin glargine Injectable (LANTUS) 7 Unit(s) SubCutaneous at bedtime  insulin lispro (HumaLOG) corrective regimen sliding scale   SubCutaneous every 6 hours  pantoprazole  Injectable 40 milliGRAM(s) IV Push daily  polyethylene glycol 3350 17 Gram(s) Oral daily  saline laxative (FLEET) Rectal Enema 1 Enema Rectal <User Schedule>    MEDICATIONS  (PRN):  dextrose 40% Gel 15 Gram(s) Oral once PRN Blood Glucose LESS THAN 70 milliGRAM(s)/deciliter  glucagon  Injectable 1 milliGRAM(s) IntraMuscular once PRN Glucose LESS THAN 70 milligrams/deciliter  saline laxative (FLEET) Rectal Enema 1 Enema Rectal daily PRN constipation, to assist in bowel movement  NOTE In accordance with  [facility] policy ICU final medical management decisions/MD orders are  determined/done only by ICU Intensivists   OBJECTIVE  T(C): 37.9 (06-01-20 @ 08:00), Max: 37.9 (06-01-20 @ 08:00)  HR: 81 (06-01-20 @ 12:26) (81 - 135)  BP: 151/69 (06-01-20 @ 12:00) (110/58 - 191/121)  RR: 23 (06-01-20 @ 12:00) (14 - 48)  SpO2: 100% (06-01-20 @ 12:26) (93% - 100%)  Wt(kg): --  I&O's Summary  31 May 2020 07:01  -  01 Jun 2020 07:00  --------------------------------------------------------  IN: 1260 mL / OUT: 1000 mL / NET: 260 mL    01 Jun 2020 07:01  -  01 Jun 2020 13:59  --------------------------------------------------------  IN: 440 mL / OUT: 120 mL / NET: 320 mL  REVIEW OF SYSTEMS:  REVIEW OF SYSTEMS :ros is unobtainable due to aphasia   PHYSICAL EXAM  General: NAD  HEENT: +trach  Resp: DC BS b/l  CVS: RRR  Abd: soft, NT/ND peg  Ext: no edema  Skin: warm well perfused  LABS:                        9.8    8.70  )-----------( 254      ( 01 Jun 2020 05:43 )             32.2     06-01    144  |  111<H>  |  26<H>  ----------------------------<  246<H>  4.2   |  27  |  0.88    Ca    8.8      01 Jun 2020 05:43  Phos  2.3     06-01  Mg     2.4     06-01    TPro  7.4  /  Alb  2.7<L>  /  TBili  0.5  /  DBili  <.10  /  AST  16  /  ALT  18  /  AlkPhos  90  06-01    CAPILLARY BLOOD GLUCOSE      POCT Blood Glucose.: 218 mg/dL (01 Jun 2020 11:46)  POCT Blood Glucose.: 259 mg/dL (01 Jun 2020 05:07)  POCT Blood Glucose.: 180 mg/dL (31 May 2020 23:13)  POCT Blood Glucose.: 211 mg/dL (31 May 2020 17:15)          Culture - Sputum (collected 29 May 2020 21:16)  Source: .Sputum Sputum  Gram Stain (29 May 2020 22:19):    Few polymorphonuclear leukocytes per low power field    Few Squamous epithelial cells per low power field    Few Gram Negative Rods per oil power field  Final Report (31 May 2020 16:00):    Few Pseudomonas aeruginosa    Normal Respiratory Elvira present  Organism: Pseudomonas aeruginosa (31 May 2020 16:00)  Organism: Pseudomonas aeruginosa (31 May 2020 16:00)    Culture - Urine (collected 29 May 2020 01:06)  Source: .Urine Catheterized  Final Report (30 May 2020 07:47):    <10,000 CFU/mL Normal Urogenital Elvira    Culture - Blood (collected 29 May 2020 00:35)  Source: .Blood Blood-Peripheral  Preliminary Report (30 May 2020 01:02):    No growth to date.    Culture - Blood (collected 29 May 2020 00:35)  Source: .Blood Blood-Peripheral  Preliminary Report (30 May 2020 01:02):    No growth to date.      RADIOLOGY & ADDITIONAL TESTS:< from: CT Head No Cont (05.29.20 @ 21:27) >  EXAM:  CT BRAIN                            PROCEDURE DATE:  05/29/2020          INTERPRETATION:  NONCONTRAST CT OF THE BRAIN DATED 5/29/2020.    CLINICAL INFORMATION:  Possible seizure    TECHNIQUE: Axial CT images are obtained from the cranial vertex to the skull base without the administration of IV contrast. Images are reformatted in sagittal and coronal planes.    The study is correlated with a prior exam from 5/8/2020.    FINDINGS:    Evaluation is degraded by motion. There is no gross acute intra-axial or extra-axial hemorrhage. There is no significant mass effect or shift of the midline. There is marked cerebral volume loss with prominence of the ventricles and sulci. Chronic ischemic changes are seen in the frontoparietal white matter. There are atherosclerotic calcifications of the intracranial carotid arteries.    The regional soft tissues and osseous structures are unremarkable. The visualized paranasal sinuses and tympanic/mastoid cavities are clear apart from aerosolized secretions in the left sphenoid sinus, small mucous retention cyst versus polyp in the right sphenoid sinus, and a right mastoid effusion.    IMPRESSION:    Motion degraded exam shows no gross acute intracranial hemorrhage or mass effect. No significantinterval change compared to the prior exam from 5/8/2020.    < end of copied text >     reviewed elctronically   ASSESSMENT/PLAN:

## 2020-06-01 NOTE — CHART NOTE - NSCHARTNOTEFT_GEN_A_CORE
Assessment: Pt remains in ICU awaiting transfer bach to SNF. Tolerating TF. BMx2 5/31, and today. TF meeting previously estimated needs at current rate.     Factors impacting intake: [ ] none [ ] nausea  [ ] vomiting [ ] diarrhea [ ] constipation  [ ]chewing problems [ ] swallowing issues  [ ] other:     Diet Presciption: Diet, NPO:   Tube Feeding Modality: Gastro-Jejunostomy  Glucerna 1.5  Total Volume for 24 Hours (mL): 1100  Continuous  Starting Tube Feed Rate {mL per Hour}: 50  Until Goal Tube Feed Rate (mL per Hour): 50  Tube Feed Duration (in Hours): 22  Tube Feed Start Time: 13:00 (05-29-20 @ 12:23)      Current Weight: Weight (kg): 67.4 (05-29 @ 00:49), 63.5 (05-28 @ 18:45)      Pertinent Medications: MEDICATIONS  (STANDING):  chlorhexidine 0.12% Liquid 15 milliLiter(s) Oral Mucosa every 12 hours  chlorhexidine 2% Cloths 1 Application(s) Topical daily  dextrose 5%. 1000 milliLiter(s) (50 mL/Hr) IV Continuous <Continuous>  dextrose 50% Injectable 12.5 Gram(s) IV Push once  dextrose 50% Injectable 25 Gram(s) IV Push once  dextrose 50% Injectable 25 Gram(s) IV Push once  enoxaparin Injectable 40 milliGRAM(s) SubCutaneous daily  fentaNYL   Patch  12 MICROgram(s)/Hr 1 Patch Transdermal every 72 hours  insulin glargine Injectable (LANTUS) 7 Unit(s) SubCutaneous at bedtime  insulin lispro (HumaLOG) corrective regimen sliding scale   SubCutaneous every 6 hours  pantoprazole  Injectable 40 milliGRAM(s) IV Push daily  polyethylene glycol 3350 17 Gram(s) Oral daily  saline laxative (FLEET) Rectal Enema 1 Enema Rectal <User Schedule>    MEDICATIONS  (PRN):  dextrose 40% Gel 15 Gram(s) Oral once PRN Blood Glucose LESS THAN 70 milliGRAM(s)/deciliter  glucagon  Injectable 1 milliGRAM(s) IntraMuscular once PRN Glucose LESS THAN 70 milligrams/deciliter  saline laxative (FLEET) Rectal Enema 1 Enema Rectal daily PRN constipation, to assist in bowel movement    Pertinent Labs: 06-01 Na144 mmol/L Glu 246 mg/dL<H> K+ 4.2 mmol/L Cr  0.88 mg/dL BUN 26 mg/dL<H> 06-01 Phos 2.3 mg/dL<L> 06-01 Alb 2.7 g/dL<L> 06-01 PAB 16 mg/dL<L>     CAPILLARY BLOOD GLUCOSE      POCT Blood Glucose.: 259 mg/dL (01 Jun 2020 05:07)  POCT Blood Glucose.: 180 mg/dL (31 May 2020 23:13)  POCT Blood Glucose.: 211 mg/dL (31 May 2020 17:15)  POCT Blood Glucose.: 218 mg/dL (31 May 2020 11:39)    Skin: no pressure injuries noted    Estimated Needs:   [x ] no change since previous assessment  [ ] recalculated:     Previous Nutrition Diagnosis: none      New Nutrition Diagnosis: [ x] not applicable       Interventions: Continue TF, will follow for tolerance  Recommend  [ ] Change Diet To:  [ ] Nutrition Supplement  [ ] Nutrition Support  [ ] Other:     Monitoring and Evaluation:   [ ] PO intake [ x ] Tolerance to diet prescription [ x ] weights [ x ] labs[ x ] follow up per protocol  [ ] other:

## 2020-06-01 NOTE — PROGRESS NOTE ADULT - SUBJECTIVE AND OBJECTIVE BOX
SUBJECTIVE: Patient is a 5 year old female with complaints of : having a syncopal episode at  School today;  She was eating her lunch and felt dizzy and mother states that students and teachers around her, noted her passing out and leaning towards one side for few seconds only and then she woke up and behaved normal. She had a mild headache for 10-15 minutes after syncope but got better now.  No nausea, vomiting, or any other neurologic symptoms  Mother says that now child is normal and behaving normal with out any symptoms;  denies any chest pain, sob, palpitations or any other cardiovascular symptoms;  denies any respiratory distress or symptoms;  denies any gastrointestinal symptoms;  denies any genitourinary symptoms;  denies any headaches, visual changes or any other neurologic symptoms;    Past Medical History:   Diagnosis Date   • Constipation due to slow transit 9/29/16   • Cough variant asthma    • Health supervision of other healthy infant or child receiving care        Social History     Tobacco Use   Smoking Status Never Smoker   Smokeless Tobacco Never Used   Tobacco Comment    1/2016 - no smoke in home         PHYSICAL EXAM:  APPEARANCE:  Healthy, alert, in no distress  HEAD: normocephalic. No masses, lesions, tenderness or abnormalities  EYES: Pupils equal, round reactive to light & accommodation and normal extraocular movements  EARS: tympanic membranes normal  NOSE: normal  THROAT: normal  NECK: Neck supple. No masses. No adenopathy.  no JVD; neg meningeal signs   LUNGS: clear to auscultation and percussion  HEART: regular rate and rhythm, S1 and S2 normal and with no gallops or murmurs  EXTREMITIES: the extremities are normal with no signs of inflammation or injury. There is no edema.  NEUROLOGIC: CN II-XII intact, sensory and motor grossly intact, reflexes normal and symmetric. and normal speech and gait    ASSESSMENT:  (R55) Syncope, unspecified syncope type  (primary encounter diagnosis)  Plan:  ELECTROCARDIOGRAM 12-LEAD, ELECTROCARDIOGRAM         12-LEAD, CBC & AUTO DIFFERENTIAL, COMPREHENSIVE        METABOLIC PANEL, GLUCOSE LEVEL, GLUCOSE LEVEL        F/u with pcp in 1-2 days for recheck  If recurrence of symptoms of syncope or dizziness, or any other symptoms, seek medical attention and go to ER.  Mother agreed with above   Patient instructions - AVS given to patient.         Neurology follow up note    BREA [id number]      Interval History:      Patient resting in bed    MEDICATIONS    chlorhexidine 0.12% Liquid 15 milliLiter(s) Oral Mucosa every 12 hours  chlorhexidine 2% Cloths 1 Application(s) Topical daily  dextrose 40% Gel 15 Gram(s) Oral once PRN  dextrose 5%. 1000 milliLiter(s) IV Continuous <Continuous>  dextrose 50% Injectable 12.5 Gram(s) IV Push once  dextrose 50% Injectable 25 Gram(s) IV Push once  dextrose 50% Injectable 25 Gram(s) IV Push once  enoxaparin Injectable 40 milliGRAM(s) SubCutaneous daily  fentaNYL   Patch  12 MICROgram(s)/Hr 1 Patch Transdermal every 72 hours  glucagon  Injectable 1 milliGRAM(s) IntraMuscular once PRN  insulin glargine Injectable (LANTUS) 7 Unit(s) SubCutaneous at bedtime  insulin lispro (HumaLOG) corrective regimen sliding scale   SubCutaneous every 6 hours  pantoprazole  Injectable 40 milliGRAM(s) IV Push daily  polyethylene glycol 3350 17 Gram(s) Oral daily  saline laxative (FLEET) Rectal Enema 1 Enema Rectal <User Schedule>  saline laxative (FLEET) Rectal Enema 1 Enema Rectal daily PRN      Allergies    codeine (Hives)    Intolerances            Vital Signs Last 24 Hrs  T(C): 37.9 (01 Jun 2020 08:00), Max: 37.9 (01 Jun 2020 08:00)  T(F): 100.3 (01 Jun 2020 08:00), Max: 100.3 (01 Jun 2020 08:00)  HR: 81 (01 Jun 2020 12:26) (81 - 135)  BP: 151/69 (01 Jun 2020 12:00) (110/58 - 191/121)  BP(mean): 99 (01 Jun 2020 12:00) (80 - 148)  RR: 23 (01 Jun 2020 12:00) (14 - 48)  SpO2: 100% (01 Jun 2020 12:26) (93% - 100%)    REVIEW OF SYSTEMS:  Could not be obtained secondary to the patient being nonverbal.  As per my conversation with the spouse, a gradual process along with underlying strokes causing her to become bed-bound.    PHYSICAL EXAMINATION:    Head:  Normocephalic, atraumatic.  Eyes:  No scleral icterus.  Ears:  Cannot be evaluated secondary to the patient being nonverbal.  NECK:  Tracheostomy was in place.  RESPIRATORY:  Good air entry bilaterally.  CARDIOVASCULAR:  S1 and S2 heard.  ABDOMEN:  Soft and nontender.  EXTREMITIES:  No clubbing or cyanosis were noted.      NEUROLOGIC:  The patient was awake in bed.  Upon stimulating the patient, her eyes did roll up.  Her body started to stiffen with abnormal mouth movements, lasted one to two minutes.  Pupils bilaterally were 3 mm, reactive to 2 mm.  The patient has her arms in a flexed position with both hands contracted.  Bilateral lower extremities were in a straight position.  The patient has increased tone, bilateral upper and lower extremities.                LABS:  CBC Full  -  ( 01 Jun 2020 05:43 )  WBC Count : 8.70 K/uL  RBC Count : 3.61 M/uL  Hemoglobin : 9.8 g/dL  Hematocrit : 32.2 %  Platelet Count - Automated : 254 K/uL  Mean Cell Volume : 89.2 fl  Mean Cell Hemoglobin : 27.1 pg  Mean Cell Hemoglobin Concentration : 30.4 gm/dL  Auto Neutrophil # : 6.74 K/uL  Auto Lymphocyte # : 1.12 K/uL  Auto Monocyte # : 0.58 K/uL  Auto Eosinophil # : 0.05 K/uL  Auto Basophil # : 0.06 K/uL  Auto Neutrophil % : 77.4 %  Auto Lymphocyte % : 12.9 %  Auto Monocyte % : 6.7 %  Auto Eosinophil % : 0.6 %  Auto Basophil % : 0.7 %      06-01    144  |  111<H>  |  26<H>  ----------------------------<  246<H>  4.2   |  27  |  0.88    Ca    8.8      01 Jun 2020 05:43  Phos  2.3     06-01  Mg     2.4     06-01    TPro  7.4  /  Alb  2.7<L>  /  TBili  0.5  /  DBili  <.10  /  AST  16  /  ALT  18  /  AlkPhos  90  06-01    Hemoglobin A1C:     LIVER FUNCTIONS - ( 01 Jun 2020 05:43 )  Alb: 2.7 g/dL / Pro: 7.4 g/dL / ALK PHOS: 90 U/L / ALT: 18 U/L / AST: 16 U/L / GGT: x           Vitamin B12         RADIOLOGY    ANALYSIS AND PLAN:  A 74-year-old with abnormal movements.  1.	For abnormal stiffening, suspect most likely secondary to a history of underlying neurodegenerative type process with degeneration of the patient's brain parenchyma, causing her to have these abnormal movements.  As per my conversation with the spouse, this has been going on for over five years.  She has had over 200 of these events, and as per my conversation with him, he did have EEG prolonged monitoring which recorded these events, they were not seizure activities.  2.	No need for any antiepileptic medications.  3.	For respiratory failure, monitor respiratory status.  4.	Monitor the patient's systolic blood pressure.  5.	Monitor electrolytes.  6.	Spoke with the spouse in great detail, Marciano.  7.	From neurology standpoint only, the patient is stable.  8.	Advanced care directives were discussed with the spouse.  9.	spoke to spouse yesterday about possible medications to add medications for these movement he is refusing     Greater than 40 minutes spent in direct patient care reviewing  the notes, lab data/ imaging , discussion with multidisciplinary team.

## 2020-06-01 NOTE — PROGRESS NOTE ADULT - ATTENDING COMMENTS
74 Female transferred from  [facility] center to [place] ED with report of sepsis, pneumonia, RYAN, patient on chronic trach collar , recent admission to [facility]  5/4/20 to 5/18/20 for hypoxia, pneumonia, COVID positive 04/27/20 ,developed hypoxia and was placed on respiratory support ,required ICU admission with shock and was placed on pressors  .Treated for UTI and pseudomonal pneumonia ,s/p cefepime ,had elevation of D-dimers .CTT was negative for PE , Doppler neg for DVT .Seen by neurologist for sz -like activity ,neurologist impression was that it was related to hypoxia CT Brain was negative for CVA .COVID  tested negative on 05/11 and 05/12  Patient had been sent to ER for respiratory distress ,associated with hypotension tachycardia ,tachypnea and diaphoresis  .Patient has a hx significant for Advanced dementia  ,Aphasic stroke  ,Constipation  ,COVID-19 virus detected  ,COPD ,Anxiety ,Dysphagia ,hyperkalemia ,pneumonia ,pulmonary edema , CVA (cerebral vascular accident)  ,Dementia of frontal lobe type  ,Diabetes mellitus  ,DM (diabetes mellitus)  ,GERD (gastroesophageal reflux disease)  ,HTN (hypertension)  ,Hypertension  ,Pneumonia  ,Quadriplegia  ,Respiratory failure  ,Feeding by G-tube  ,Gastrostomy in place  ,Hx of appendectomy  ,Tracheostomy in place  ,Tracheostomy tube present ,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 Admitted to ICU ,seen by pulm cons Dr Rojelio Sandoval on admission to [place] ED ( transferred from [facility])and was admitted to ICU  for septic workup and evaluation ,send blood and urine cx, serial lactate levels ,monitor vitals closely hydration ,monitor urine output and renal profile ,iv abx initiated . Palliative care consult requested ,to discuss advance directives and complete MOLST .Patient was diagnosed with sepsis 2/2 to pneumonia and  called ER respiratory and  ICU ,patient  already received Cefepime 2gm iv, vancomycin  1gm iv, rectal Tylenol 650mg, LR x 1 liter, NS x 1 liter in [place] ED .Further plan of care as per pulmonologist Dr Sandoval and icu team

## 2020-06-01 NOTE — PROGRESS NOTE ADULT - ASSESSMENT
cont rx cont rx    PARASHARAMI BREA  1945 DOA 2020 DR ILIANA KEMP     REVIEW OF SYMPTOMS      Able to give ROS  NO     PHYSICAL EXAM    HEENT Unremarkable PERRLA atraumatic   RESP Fair air entry EXP prolonged    Harsh breath sound Resp distres mild   CARDIAC S1 S2 No S3     NO JVD    ABDOMEN SOFT BS PRESENT NOT DISTENDED No hepatosplenomegaly PEDAL EDEMA present No calf tenderness  NO rash   GENERAL Not TOXIC looking    OXYGENATION        VITALS/LABS   2020 81 140/60   2020 ac 14/420//.3     PT DATA/BEST PRACTICE  ALLERGY         codeine             WT     63 (2020)                                 BMI     23 (2020)                        CrCl                                  ADVANCED DIRECTIVE     HEAD OF BED ELEVATION Yes      INFECTION PPLX     Chlorhexidine 2% ()   Chlorhexidine .12% ()   DVT PROPHYLAXIS   hpsc ()  --> lvnx 40 ()               SQUIRES PROPHYLAXIS    Protonix 40 ()       DYSPHAGIA EVAL     DIET     npo () --> TF Flucerna 1.5 1100 (529)                                                                   IV F    (-)   ABIO   zosyn (-)   Vanco 1.2 (-)     COVID PCR  N-vinnie   COVID PCR  N-vinnie     W ---2020 W 21-8.9 -7-8/7  PROC 2020 PC 2.9  CXR  BL infiltr  MICROBIO    MRSA N-VINNIE    Sp Few GNR Pseudomonas    Urine C N-VINNIE  Blod C N-VINNIE     LA --2020 LA 10.5-2.6 - 7.9     D-DIMR 2020 D-dimr 668  DUPLX 2020 V Duplex N-vinnie     Hb --- Hb 11-9.5-9.2-9.8     K -2020 K 6-4.6   Cr 5/10- --2020 Cr 1-1.3-.7 -.8    AMS  CT   CT H N-vinnie     ABG   2020 1a ac 14/420/35%/5 730/51/117                               PATIENT SUMMARY     74 f PMH aphasic stroke functional quad dementia GERD trach vent dependent recently hospitalisd [facility] was COVID P+vinnie 2020 was admitted 2020 for dyspnea possible sepsis Pulm consulted     home meds psyllium feso4 insulin fentanyl 12 glarigine 20 lvnx 40                                          PAST ADMISSION -[facility]         PATIENT DATA/ASSESSMENT/RECOMMENDATIONS     RESP DISTRESS POA   Possibly  sec to Seizures vs Ventilator associated pneumonia (WBC Infiltrates)  Improved    MECHANICAL VENT   Monitor abg Target po 90-95% Vent protocol Trach care   Infection ppx  Gas exchange   showed resp acidosis     INFECTION  COVID  PCR N-VINNIE    FEVER POSSIBLE PNEUMONIA POA 2020   HO recent hospital stay   On vent Leukocytosis poa   Vanco zosyn  DCed 2020 as cultures N-VINNIE   sputum c showed Pseudomonas This is likely colonization   Dr Olivas on case      LACTICEMIA POA   Cause of LA may have been unwitnessed sz or sepsis   IV fluids and monitor serially       ELEVATED D-DIMR POA   ELEVATED PTT POA   Was on lovenox pplx at [facility]  2020 Suggest Check  V duplex  W  2020 Check  cta ch     RYAN POA   IV fluids   Improved    ANEMIA   Hb stable     DM   sl scale     RO SEIZURE   CT H N-vinnie     PLAN   Infection felt to be unlikely and abio dced 2020  Suggest ritchie of sl elevated D-dimer eg cta v duplx Monitor abg po                                          TIME SPENT Over 25 minutes aggregate care time spent on encounter; activities included   direct pt care, counseling and/or coordinating care reviewing notes, lab data/ imaging , discussion with multidisciplinary team/ pt /family. Risks, benefits, alternatives  discussed in detail.    CHAPINCITO GUIDRY  1945 DOA 2020 DR ILIANA KEMP

## 2020-06-01 NOTE — PROGRESS NOTE ADULT - PROBLEM SELECTOR PLAN 3
Currently low suspicion of sepsis and no sz events was determined by neurologist ,continue monitoring serum lactate levels ,IV hydration prn

## 2020-06-01 NOTE — PROGRESS NOTE ADULT - ASSESSMENT
73 y/o woman with PMHx of frontotemporal dementia, chronic vent dependent respiratory failure, bedbound at baseline who presents with respiratory distress, diaphoresis, and generalized spasm of extremities.  She was found to be febrile, hypertensive, tachycardic, tachypneic with severe lactic acidosis.  Concern for sz activity with elevated lactate, initially downtrended with uptrend due to possible repeat seizure activity, as well as elevated prolactin.    Neuro: Concern for seizure activity in light of severe lactic acidosis and elevated prolactin, but not current evidence versus abnormal stiffening 2/2 neurodegenerative process. Extensive neurological w/u in past, including 3-day EEG, without evidence of seizure activity. No need for antiepileptic medications as per neuro.  CV: Hemodynamically stable.   Pulm: Chronic vent-dependent respiratory failure. Continue full support w/AC/14/420/30/5.  Renal: Normal renal function.  GI: Continue TFs 2/2 dysphagia. Continue bowel regimen for chronic constipation. Protonix for GI ppx.  ID: Trach aspirate cx from 5/29 with few pseudomonas, likely colonizer rather than true infection. Continue to monitor off antibiotics, WBC stable and afebrile currently.  Endo: FSGs suboptimal, on moderate dose SS Q6H. Will consider addition of lantus QHS?  : Chronic vaughn catheter in place.  Dispo: Stable for d/c back to SNF.    . 75 y/o woman with PMHx of frontotemporal dementia, chronic vent dependent respiratory failure, bedbound at baseline who presents with respiratory distress, diaphoresis, and generalized spasm of extremities.  She was found to be febrile, hypertensive, tachycardic, and tachypneic on admission with severe lactic acidosis.  Concern for seizure activity with elevated lactate, initially downtrended with uptrend due to possible repeat seizure activity. Low grade fever this morning, but otherwise resolved signs of infection and tolerating chronic vent settings.     Neuro: Concern for seizure activity on admission in light of severe lactic acidosis and elevated prolactin, no evidence currently. Extensive neurological w/u in past, including 3-day EEG, without evidence of seizure activity. No need for antiepileptic medications as per neuro. Abnormal stiffening likely 2/2 neurodegenerative process.   CV: Hemodynamically stable.   Pulm: Chronic vent-dependent respiratory failure. Continue full support w/AC/14/420/30/5.  Renal: Normal renal function.  GI: Continue TFs 2/2 dysphagia. Continue bowel regimen for chronic constipation. Protonix for GI ppx.  ID: Trach aspirate cx from 5/29 with few pseudomonas, likely colonizer rather than true infection. Continue to monitor off antibiotics, WBC stable and afebrile currently.  Endo: FSGs suboptimal, on moderate dose SS Q6H. Will consider addition of lantus QHS?  : Chronic vaughn catheter in place.  Dispo: Stable for d/c back to SNF.    . 73 y/o woman with PMHx of frontotemporal dementia, chronic vent dependent respiratory failure, bedbound at baseline who presents with respiratory distress, diaphoresis, and generalized spasm of extremities.  She was found to be febrile, hypertensive, tachycardic, and tachypneic on admission with severe lactic acidosis.  Concern for seizure activity with elevated lactate, initially downtrended with uptrend due to possible repeat seizure activity. Low grade fever this morning, but otherwise resolved signs of infection and tolerating chronic vent settings.     Neuro: Concern for seizure activity on admission in light of severe lactic acidosis and elevated prolactin, no evidence currently. Extensive neurological w/u in past, including 3-day EEG, without evidence of seizure activity. No need for antiepileptic medications as per neuro. Abnormal stiffening likely 2/2 neurodegenerative process.   CV: Hemodynamically stable.   Pulm: Chronic vent-dependent respiratory failure, continue full support to keep SpO2 >90%, settings: AC/14/420/30/5 currently.  Renal: Normal renal function.  GI: Continue TFs 2/2 dysphagia. Continue bowel regimen for chronic constipation with . Protonix for GI ppx.  ID: Trach aspirate cx from 5/29 with few pseudomonas, likely colonizer rather than true infection. Continue to monitor off antibiotics, WBC stable and afebrile currently.  Endo: FSGs suboptimal, on moderate dose SS Q6H. Will consider addition of lantus QHS?  : Chronic vaughn catheter in place.  Dispo: Stable for d/c back to SNF.    . 73 y/o woman with PMHx of frontotemporal dementia, chronic vent dependent respiratory failure, bedbound at baseline who presents with respiratory distress, diaphoresis, and generalized spasm of extremities.  She was found to be febrile, hypertensive, tachycardic, and tachypneic on admission with severe lactic acidosis.  Concern for seizure activity with elevated lactate, initially downtrended with uptrend due to possible repeat seizure activity. Low grade fever this morning, but otherwise resolved signs of infection and tolerating chronic vent settings.     Neuro: Concern for seizure activity on admission in light of severe lactic acidosis and elevated prolactin, no evidence currently. Extensive neurological w/u in past, including 3-day EEG, without evidence of seizure activity. No need for antiepileptic medications as per neuro. Abnormal stiffening likely 2/2 neurodegenerative process.   CV: Hemodynamically stable.   Pulm: Chronic vent-dependent respiratory failure, continue full support to keep SpO2 >90%, settings: AC/14/420/30/5 currently.  Renal: Normal renal function. Monitor and supplement lytes PRN.  GI: Continue TFs 2/2 dysphagia. Continue bowel regimen for chronic constipation with miralax, fleet enema's MWF, and PRN fleet enemas at request of patient's family. Protonix for GI ppx.  ID: Trach aspirate cx from 5/29 with few pseudomonas, likely colonizer rather than true infection. Continue to monitor off antibiotics, WBC stable and afebrile currently.  Endo: FSGs suboptimal, on moderate dose SS Q6H. Will add 7U lantus QHS.  : Chronic vaughn catheter in place, continue for urinary retention.  Heme: Lovenox 40mg SQ daily for DVT ppx.  Dispo: Stable for d/c back to SNF.    .

## 2020-06-01 NOTE — PROGRESS NOTE ADULT - SUBJECTIVE AND OBJECTIVE BOX
BREA BECKHAM    \[facility]\"" ICU1 02    Patient is a 74y old  Female who presents with a chief complaint of RESPIRATORY DISTRESS (01 Jun 2020 12:59)       Allergies    codeine (Hives)    Intolerances        HPI:  74 Female transferred from  [facility] center to [place] ED with report of sepsis, pneumonia, RYAN, patient on chronic trach collar , recent admission to [facility]  5/4/20 to 5/18/20 for hypoxia, pneumonia, COVID positive 04/27/20 ,developed hypoxia and was placed on respiratory support ,required ICU admission with shock and was placed on pressors  .Treated for UTI and pseudomonal pneumonia ,s/p cefepime ,had elevation of D-dimers .CTT was negative for PE , Doppler neg for DVT .Seen by neurologist for sz -like activity ,neurologist impression was that it was related to hypoxia CT Brain was negative for CVA .COVID  tested negative on 05/11 and 05/12  Patient had been sent to ER for respiratory distress ,associated with hypotension tachycardia ,tachypnea and diaphoresis  .Patient has a hx significant for Advanced dementia  ,Aphasic stroke  ,Constipation  ,COVID-19 virus detected  ,COPD ,Anxiety ,Dysphagia ,hyperkalemia ,pneumonia ,pulmonary edema , CVA (cerebral vascular accident)  ,Dementia of frontal lobe type  ,Diabetes mellitus  ,DM (diabetes mellitus)  ,GERD (gastroesophageal reflux disease)  ,HTN (hypertension)  ,Hypertension  ,Pneumonia  ,Quadriplegia  ,Respiratory failure  ,Feeding by G-tube  ,Gastrostomy in place  ,Hx of appendectomy  ,Tracheostomy in place  ,Tracheostomy tube present, 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 Admitted to ICU ,seen by pulm cons Dr Sandoval on admission to [place] ED ( transferred from [facility])and was admitted to ICU  for septic workup and evaluation ,send blood and urine cx, serial lactate levels ,monitor vitals closely hydration ,monitor urine output and renal profile ,iv abx initiated Palliative care consult requested ,to discuss advance directives and complete MOLST .Patient was diagnosed with sepsis 2/2 to pneumonia and  called ER respiratory and  ICU ,patient  already received Cefepime 2gm iv, vancomycin  1gm iv, rectal Tylenol 650mg, LR x 1 liter, NS x 1 liter in [place] ED .Further plan of care as per pulmonologist Dr Sandoval and icu team (28 May 2020 23:07)      PAST MEDICAL & SURGICAL HISTORY:  Pneumonia  Quadriplegia  COVID-19 virus detected  Advanced dementia  DM (diabetes mellitus)  HTN (hypertension)  CVA (cerebral vascular accident)  Respiratory failure  Constipation  GERD (gastroesophageal reflux disease)  Hypertension  Respiratory failure  Diabetes mellitus  Aphasic stroke  Dementia of frontal lobe type  Feeding by G-tube  Tracheostomy tube present  Tracheostomy in place  Gastrostomy in place  Hx of appendectomy      FAMILY HISTORY:  No pertinent family history in first degree relatives        MEDICATIONS   chlorhexidine 0.12% Liquid 15 milliLiter(s) Oral Mucosa every 12 hours  chlorhexidine 2% Cloths 1 Application(s) Topical daily  dextrose 40% Gel 15 Gram(s) Oral once PRN  dextrose 5%. 1000 milliLiter(s) IV Continuous <Continuous>  dextrose 50% Injectable 12.5 Gram(s) IV Push once  dextrose 50% Injectable 25 Gram(s) IV Push once  dextrose 50% Injectable 25 Gram(s) IV Push once  enoxaparin Injectable 40 milliGRAM(s) SubCutaneous daily  fentaNYL   Patch  12 MICROgram(s)/Hr 1 Patch Transdermal every 72 hours  glucagon  Injectable 1 milliGRAM(s) IntraMuscular once PRN  insulin glargine Injectable (LANTUS) 7 Unit(s) SubCutaneous at bedtime  insulin lispro (HumaLOG) corrective regimen sliding scale   SubCutaneous every 6 hours  pantoprazole  Injectable 40 milliGRAM(s) IV Push daily  polyethylene glycol 3350 17 Gram(s) Oral daily  saline laxative (FLEET) Rectal Enema 1 Enema Rectal <User Schedule>  saline laxative (FLEET) Rectal Enema 1 Enema Rectal daily PRN      Vital Signs Last 24 Hrs  T(C): 37.9 (01 Jun 2020 08:00), Max: 37.9 (01 Jun 2020 08:00)  T(F): 100.3 (01 Jun 2020 08:00), Max: 100.3 (01 Jun 2020 08:00)  HR: 81 (01 Jun 2020 12:26) (81 - 122)  BP: 151/69 (01 Jun 2020 12:00) (110/58 - 191/121)  BP(mean): 99 (01 Jun 2020 12:00) (80 - 148)  RR: 23 (01 Jun 2020 12:00) (14 - 48)  SpO2: 100% (01 Jun 2020 12:26) (94% - 100%)      05-31-20 @ 07:01  -  06-01-20 @ 07:00  --------------------------------------------------------  IN: 1260 mL / OUT: 1000 mL / NET: 260 mL    06-01-20 @ 07:01  -  06-01-20 @ 14:01  --------------------------------------------------------  IN: 440 mL / OUT: 120 mL / NET: 320 mL        Mode: AC/ CMV (Assist Control/ Continuous Mandatory Ventilation), RR (machine): 14, TV (machine): 420, FiO2: 30, PEEP: 5, ITime: 1, MAP: 10, PIP: 27    LABS:                        9.8    8.70  )-----------( 254      ( 01 Jun 2020 05:43 )             32.2     06-01    144  |  111<H>  |  26<H>  ----------------------------<  246<H>  4.2   |  27  |  0.88    Ca    8.8      01 Jun 2020 05:43  Phos  2.3     06-01  Mg     2.4     06-01    TPro  7.4  /  Alb  2.7<L>  /  TBili  0.5  /  DBili  <.10  /  AST  16  /  ALT  18  /  AlkPhos  90  06-01              WBC:  WBC Count: 8.70 K/uL (06-01 @ 05:43)  WBC Count: 6.78 K/uL (05-31 @ 05:55)  WBC Count: 7.08 K/uL (05-30 @ 04:58)  WBC Count: 8.90 K/uL (05-29 @ 05:51)  WBC Count: 21.68 K/uL (05-28 @ 19:02)      MICROBIOLOGY:  RECENT CULTURES:  05-29 .Sputum Sputum Pseudomonas aeruginosa   Few polymorphonuclear leukocytes per low power field  Few Squamous epithelial cells per low power field  Few Gram Negative Rods per oil power field   Few Pseudomonas aeruginosa  Normal Respiratory Elvira present    05-29 .Urine Catheterized XXXX XXXX   <10,000 CFU/mL Normal Urogenital Elvira    05-29 .Blood Blood-Peripheral XXXX XXXX   No growth to date.                    Sodium:  Sodium, Serum: 144 mmol/L (06-01 @ 05:43)  Sodium, Serum: 143 mmol/L (05-31 @ 05:55)  Sodium, Serum: 142 mmol/L (05-30 @ 04:58)  Sodium, Serum: 142 mmol/L (05-29 @ 05:51)  Sodium, Serum: 140 mmol/L (05-28 @ 23:45)  Sodium, Serum: 139 mmol/L (05-28 @ 19:02)      0.88 mg/dL 06-01 @ 05:43  0.77 mg/dL 05-31 @ 05:55  0.84 mg/dL 05-30 @ 04:58  0.77 mg/dL 05-29 @ 05:51  0.83 mg/dL 05-28 @ 23:45  1.37 mg/dL 05-28 @ 19:02      Hemoglobin:  Hemoglobin: 9.8 g/dL (06-01 @ 05:43)  Hemoglobin: 9.2 g/dL (05-31 @ 05:55)  Hemoglobin: 9.2 g/dL (05-30 @ 04:58)  Hemoglobin: 9.6 g/dL (05-29 @ 05:51)  Hemoglobin: 11.9 g/dL (05-28 @ 19:02)      Platelets: Platelet Count - Automated: 254 K/uL (06-01 @ 05:43)  Platelet Count - Automated: 233 K/uL (05-31 @ 05:55)  Platelet Count - Automated: 214 K/uL (05-30 @ 04:58)  Platelet Count - Automated: 206 K/uL (05-29 @ 05:51)  Platelet Count - Automated: 386 K/uL (05-28 @ 19:02)      LIVER FUNCTIONS - ( 01 Jun 2020 05:43 )  Alb: 2.7 g/dL / Pro: 7.4 g/dL / ALK PHOS: 90 U/L / ALT: 18 U/L / AST: 16 U/L / GGT: x                 RADIOLOGY & ADDITIONAL STUDIES:

## 2020-06-01 NOTE — PROGRESS NOTE ADULT - ATTENDING COMMENTS
73 yo woman with frontotemporal dementia, chronic vent dependent respiratory failure, bedbound presenting with respiratory distress, diaphoresis and generalized spasm of extremities.  She was found to be febrile, hypertensive, tachycardic, tachypneic with severe lactic acidosis.  Concern for sz activity.    --no current evidence of sz activity  appreciate neuro eval  --hemodynamically stable  --chronic vent dependent respiratory failure  continue full vent support  --normal renal function  --dysphagia, continue TF  chronic constipation, continue bowel regimen  --trach aspirate Cx with few pseudomonas, likely colonizer rather than true infection  monitor off Abx  --hyperglycemia, increase to moderate insulin coverage scale  --chronic vaughn . 73 yo woman with frontotemporal dementia, chronic vent dependent respiratory failure, bedbound presenting with respiratory distress, diaphoresis and generalized spasm of extremities concerning for seizure activity vs sepsis     Neuro: no witnessed seizure activity in hospital, per neuro and family, pt has had multiple similar episodes like this for years with negative work ups to date - no indication for AEDs  CV: hemodynamically stable  Pulm: chronic vent dependent respiratory failure, continue full vent support  GI: continue enteral feeds via PEG; continue bowel regimen  Renal: sCr and electrolytes stable, chronic vaughn  ID: Sputum Cx with few pseudomonas, likely colonized - monitor off Abx  Endo: remains hyperglycemic, lantus 7 units added, continue moderate insulin coverage scale    dc planning back to SNF

## 2020-06-01 NOTE — PROGRESS NOTE ADULT - ASSESSMENT
74 Female transferred from  [facility] center to [place] ED with report of sepsis, pneumonia, RYAN, patient on chronic trach collar , recent admission to [facility]  5/4/20 to 5/18/20 for hypoxia, pneumonia, COVID positive 04/27/20 ,developed hypoxia and was placed on respiratory support ,required ICU admission with shock and was placed on pressors  .Treated for UTI and pseudomonal pneumonia ,s/p cefepime ,had elevation of D-dimers .CTT was negative for PE , Doppler neg for DVT .Seen by neurologist for sz -like activity ,neurologist impression was that it was related to hypoxia CT Brain was negative for CVA .COVID  tested negative on 05/11 and 05/12  Patient had been sent to ER for respiratory distress ,associated with hypotension tachycardia ,tachypnea and diaphoresis  .Patient has a hx significant for Advanced dementia  ,Aphasic stroke  ,Constipation  ,COVID-19 virus detected  ,COPD ,Anxiety ,Dysphagia ,hyperkalemia ,pneumonia ,pulmonary edema , CVA (cerebral vascular accident)  ,Dementia of frontal lobe type  ,Diabetes mellitus  ,DM (diabetes mellitus)  ,GERD (gastroesophageal reflux disease)  ,HTN (hypertension)  ,Hypertension  ,Pneumonia  ,Quadriplegia  ,Respiratory failure  ,Feeding by G-tube  ,Gastrostomy in place  ,Hx of appendectomy  ,Tracheostomy in place  ,Tracheostomy tube present ,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 Admitted to ICU ,seen by pulm cons Dr Sandoval on admission to [place] ED ( transferred from [facility])and was admitted to ICU  for septic workup and evaluation ,send blood and urine cx, serial lactate levels ,monitor vitals closely hydration ,monitor urine output and renal profile ,iv abx initiated Palliative care consult requested ,to discuss advance directives and complete MOLST Patient was diagnosed with sepsis 2/2 to pneumonia and  called ER respiratory and  ICU ,patient  already received Cefepime 2gm iv, vancomycin  1gm iv, rectal Tylenol 650mg, LR x 1 liter, NS x 1 liter in [place] ED .Further plan of care as per pulmonologist Dr Sandoval and icu team

## 2020-06-01 NOTE — PROGRESS NOTE ADULT - SUBJECTIVE AND OBJECTIVE BOX
Patient is a 74y old  Female who presents with a chief complaint of RESPIRATORY DISTRESS (01 Jun 2020 07:14)    24 hour events: No acute events overnight.    REVIEW OF SYSTEMS  Unable to obtain meaningful ROS secondary to clinical status.    T(F): 99.8 (06-01-20 @ 04:19), Max: 99.8 (06-01-20 @ 04:19)  HR: 105 (06-01-20 @ 07:00) (82 - 135)  BP: 143/67 (06-01-20 @ 07:00) (110/58 - 191/121)  RR: 17 (06-01-20 @ 07:00) (14 - 48)  SpO2: 100% (06-01-20 @ 07:00) (93% - 100%)    Mode: AC/ CMV (Assist Control/ Continuous Mandatory Ventilation), RR (machine): 14, TV (machine): 420, FiO2: 30, PEEP: 5        I&O's Summary    05-31 @ 07:01  -  06-01 @ 07:00  --------------------------------------------------------  IN: 1260 mL / OUT: 1000 mL / NET: 260 mL      PHYSICAL EXAM  General:   CNS:   HEENT:   Resp:   CVS:   Abd:   Ext:   Skin:     MEDICATIONS      dextrose 40% Gel Oral PRN  dextrose 50% Injectable IV Push  dextrose 50% Injectable IV Push  dextrose 50% Injectable IV Push  glucagon  Injectable IntraMuscular PRN  insulin lispro (HumaLOG) corrective regimen sliding scale SubCutaneous      fentaNYL   Patch  12 MICROgram(s)/Hr Transdermal      enoxaparin Injectable SubCutaneous    pantoprazole  Injectable IV Push  polyethylene glycol 3350 Oral  saline laxative (FLEET) Rectal Enema Rectal  saline laxative (FLEET) Rectal Enema Rectal PRN      dextrose 5%. IV Continuous  potassium phosphate IVPB IV Intermittent      chlorhexidine 0.12% Liquid Oral Mucosa  chlorhexidine 2% Cloths Topical                            9.8    8.70  )-----------( 254      ( 01 Jun 2020 05:43 )             32.2       06-01    144  |  111<H>  |  26<H>  ----------------------------<  246<H>  4.2   |  27  |  0.88    Ca    8.8      01 Jun 2020 05:43  Phos  2.3     06-01  Mg     2.4     06-01    TPro  7.4  /  Alb  2.7<L>  /  TBili  0.5  /  DBili  <.10  /  AST  16  /  ALT  18  /  AlkPhos  90  06-01              .Sputum Sputum   Few Pseudomonas aeruginosa  Normal Respiratory Elvira present   Few polymorphonuclear leukocytes per low power field  Few Squamous epithelial cells per low power field  Few Gram Negative Rods per oil power field 05-29 @ 21:16  .Urine Catheterized   <10,000 CFU/mL Normal Urogenital Elvira -- 05-29 @ 01:06  .Blood Blood-Peripheral   No growth to date. -- 05-29 @ 00:35        CHRONIC REID      GLOBAL ISSUE/BEST PRACTICE:  Analgesia: N  Sedation: N  CAM-ICU: YULIYA  HOB elevation: yes  Stress ulcer prophylaxis: Y  VTE prophylaxis: Y  Glycemic control: Y  Nutrition: Y    CODE STATUS: FULL Patient is a 74y old  Female who presents with a chief complaint of RESPIRATORY DISTRESS (01 Jun 2020 07:14)    24 hour events: No acute events overnight.    REVIEW OF SYSTEMS  Unable to obtain meaningful ROS secondary to mental status.    T(F): 99.8 (06-01-20 @ 04:19), Max: 99.8 (06-01-20 @ 04:19)  HR: 105 (06-01-20 @ 07:00) (82 - 135)  BP: 143/67 (06-01-20 @ 07:00) (110/58 - 191/121)  RR: 17 (06-01-20 @ 07:00) (14 - 48)  SpO2: 100% (06-01-20 @ 07:00) (93% - 100%)    Mode: AC/ CMV (Assist Control/ Continuous Mandatory Ventilation), RR (machine): 14, TV (machine): 420, FiO2: 30, PEEP: 5        I&O's Summary    05-31 @ 07:01  -  06-01 @ 07:00  --------------------------------------------------------  IN: 1260 mL / OUT: 1000 mL / NET: 260 mL      PHYSICAL EXAM  Gen: Chronically critically ill female, trach to vent  Neuro: Arousable by tactile stimulation and will open eyes, but somnolent. Does not respond to commands or demonstrate purposeful movements.   HEENT: PERRL, symmetric  Resp: Coarse breath sounds b/l through anterior examination  CVS: RRR, +S1 and S2  Abd: soft, NTND  Ext: UE contracted in flexion at the shoulders, elbows, and hands b/l  Skin: WWP    MEDICATIONS      dextrose 40% Gel Oral PRN  dextrose 50% Injectable IV Push  dextrose 50% Injectable IV Push  dextrose 50% Injectable IV Push  glucagon  Injectable IntraMuscular PRN  insulin lispro (HumaLOG) corrective regimen sliding scale SubCutaneous      fentaNYL   Patch  12 MICROgram(s)/Hr Transdermal      enoxaparin Injectable SubCutaneous    pantoprazole  Injectable IV Push  polyethylene glycol 3350 Oral  saline laxative (FLEET) Rectal Enema Rectal  saline laxative (FLEET) Rectal Enema Rectal PRN      dextrose 5%. IV Continuous  potassium phosphate IVPB IV Intermittent      chlorhexidine 0.12% Liquid Oral Mucosa  chlorhexidine 2% Cloths Topical                            9.8    8.70  )-----------( 254      ( 01 Jun 2020 05:43 )             32.2       06-01    144  |  111<H>  |  26<H>  ----------------------------<  246<H>  4.2   |  27  |  0.88    Ca    8.8      01 Jun 2020 05:43  Phos  2.3     06-01  Mg     2.4     06-01    TPro  7.4  /  Alb  2.7<L>  /  TBili  0.5  /  DBili  <.10  /  AST  16  /  ALT  18  /  AlkPhos  90  06-01              .Sputum Sputum   Few Pseudomonas aeruginosa  Normal Respiratory Elvira present   Few polymorphonuclear leukocytes per low power field  Few Squamous epithelial cells per low power field  Few Gram Negative Rods per oil power field 05-29 @ 21:16  .Urine Catheterized   <10,000 CFU/mL Normal Urogenital Elvira -- 05-29 @ 01:06  .Blood Blood-Peripheral   No growth to date. -- 05-29 @ 00:35        CHRONIC REID      GLOBAL ISSUE/BEST PRACTICE:  Analgesia: N  Sedation: N  CAM-ICU: YULIYA  HOB elevation: Y  Stress ulcer prophylaxis: Y  VTE prophylaxis: Y  Glycemic control: Y  Nutrition: Y    CODE STATUS: FULL

## 2020-06-02 LAB
ALBUMIN SERPL ELPH-MCNC: 2.6 G/DL — LOW (ref 3.3–5)
ALP SERPL-CCNC: 88 U/L — SIGNIFICANT CHANGE UP (ref 40–120)
ALT FLD-CCNC: 18 U/L — SIGNIFICANT CHANGE UP (ref 12–78)
ANION GAP SERPL CALC-SCNC: 5 MMOL/L — SIGNIFICANT CHANGE UP (ref 5–17)
ANION GAP SERPL CALC-SCNC: 8 MMOL/L — SIGNIFICANT CHANGE UP (ref 5–17)
AST SERPL-CCNC: 14 U/L — LOW (ref 15–37)
BASOPHILS # BLD AUTO: 0 K/UL — SIGNIFICANT CHANGE UP (ref 0–0.2)
BASOPHILS NFR BLD AUTO: 0 % — SIGNIFICANT CHANGE UP (ref 0–2)
BILIRUB SERPL-MCNC: 0.3 MG/DL — SIGNIFICANT CHANGE UP (ref 0.2–1.2)
BUN SERPL-MCNC: 23 MG/DL — SIGNIFICANT CHANGE UP (ref 7–23)
BUN SERPL-MCNC: 27 MG/DL — HIGH (ref 7–23)
CALCIUM SERPL-MCNC: 8.5 MG/DL — SIGNIFICANT CHANGE UP (ref 8.5–10.1)
CALCIUM SERPL-MCNC: 8.8 MG/DL — SIGNIFICANT CHANGE UP (ref 8.5–10.1)
CHLORIDE SERPL-SCNC: 112 MMOL/L — HIGH (ref 96–108)
CHLORIDE SERPL-SCNC: 117 MMOL/L — HIGH (ref 96–108)
CO2 SERPL-SCNC: 26 MMOL/L — SIGNIFICANT CHANGE UP (ref 22–31)
CO2 SERPL-SCNC: 27 MMOL/L — SIGNIFICANT CHANGE UP (ref 22–31)
CREAT SERPL-MCNC: 0.78 MG/DL — SIGNIFICANT CHANGE UP (ref 0.5–1.3)
CREAT SERPL-MCNC: 0.82 MG/DL — SIGNIFICANT CHANGE UP (ref 0.5–1.3)
EOSINOPHIL # BLD AUTO: 0.06 K/UL — SIGNIFICANT CHANGE UP (ref 0–0.5)
EOSINOPHIL NFR BLD AUTO: 1 % — SIGNIFICANT CHANGE UP (ref 0–6)
GLUCOSE SERPL-MCNC: 239 MG/DL — HIGH (ref 70–99)
GLUCOSE SERPL-MCNC: 252 MG/DL — HIGH (ref 70–99)
HCT VFR BLD CALC: 32 % — LOW (ref 34.5–45)
HGB BLD-MCNC: 9.5 G/DL — LOW (ref 11.5–15.5)
LYMPHOCYTES # BLD AUTO: 1.14 K/UL — SIGNIFICANT CHANGE UP (ref 1–3.3)
LYMPHOCYTES # BLD AUTO: 18 % — SIGNIFICANT CHANGE UP (ref 13–44)
MAGNESIUM SERPL-MCNC: 2.5 MG/DL — SIGNIFICANT CHANGE UP (ref 1.6–2.6)
MCHC RBC-ENTMCNC: 26.8 PG — LOW (ref 27–34)
MCHC RBC-ENTMCNC: 29.7 GM/DL — LOW (ref 32–36)
MCV RBC AUTO: 90.1 FL — SIGNIFICANT CHANGE UP (ref 80–100)
MONOCYTES # BLD AUTO: 0.57 K/UL — SIGNIFICANT CHANGE UP (ref 0–0.9)
MONOCYTES NFR BLD AUTO: 9 % — SIGNIFICANT CHANGE UP (ref 2–14)
NEUTROPHILS # BLD AUTO: 4.37 K/UL — SIGNIFICANT CHANGE UP (ref 1.8–7.4)
NEUTROPHILS NFR BLD AUTO: 65 % — SIGNIFICANT CHANGE UP (ref 43–77)
NRBC # BLD: SIGNIFICANT CHANGE UP /100 WBCS (ref 0–0)
PHOSPHATE SERPL-MCNC: 2.5 MG/DL — SIGNIFICANT CHANGE UP (ref 2.5–4.5)
PLATELET # BLD AUTO: 269 K/UL — SIGNIFICANT CHANGE UP (ref 150–400)
POTASSIUM SERPL-MCNC: 4 MMOL/L — SIGNIFICANT CHANGE UP (ref 3.5–5.3)
POTASSIUM SERPL-MCNC: 4.2 MMOL/L — SIGNIFICANT CHANGE UP (ref 3.5–5.3)
POTASSIUM SERPL-SCNC: 4 MMOL/L — SIGNIFICANT CHANGE UP (ref 3.5–5.3)
POTASSIUM SERPL-SCNC: 4.2 MMOL/L — SIGNIFICANT CHANGE UP (ref 3.5–5.3)
PROT SERPL-MCNC: 7 G/DL — SIGNIFICANT CHANGE UP (ref 6–8.3)
RBC # BLD: 3.55 M/UL — LOW (ref 3.8–5.2)
RBC # FLD: 15.9 % — HIGH (ref 10.3–14.5)
SARS-COV-2 RNA SPEC QL NAA+PROBE: SIGNIFICANT CHANGE UP
SODIUM SERPL-SCNC: 144 MMOL/L — SIGNIFICANT CHANGE UP (ref 135–145)
SODIUM SERPL-SCNC: 151 MMOL/L — HIGH (ref 135–145)
WBC # BLD: 6.33 K/UL — SIGNIFICANT CHANGE UP (ref 3.8–10.5)
WBC # FLD AUTO: 6.33 K/UL — SIGNIFICANT CHANGE UP (ref 3.8–10.5)

## 2020-06-02 PROCEDURE — 87186 SC STD MICRODIL/AGAR DIL: CPT

## 2020-06-02 PROCEDURE — 71045 X-RAY EXAM CHEST 1 VIEW: CPT

## 2020-06-02 PROCEDURE — 70450 CT HEAD/BRAIN W/O DYE: CPT

## 2020-06-02 PROCEDURE — 82962 GLUCOSE BLOOD TEST: CPT

## 2020-06-02 PROCEDURE — 93005 ELECTROCARDIOGRAM TRACING: CPT

## 2020-06-02 PROCEDURE — 82330 ASSAY OF CALCIUM: CPT

## 2020-06-02 PROCEDURE — 82728 ASSAY OF FERRITIN: CPT

## 2020-06-02 PROCEDURE — 81001 URINALYSIS AUTO W/SCOPE: CPT

## 2020-06-02 PROCEDURE — 84484 ASSAY OF TROPONIN QUANT: CPT

## 2020-06-02 PROCEDURE — 87040 BLOOD CULTURE FOR BACTERIA: CPT

## 2020-06-02 PROCEDURE — 80048 BASIC METABOLIC PNL TOTAL CA: CPT

## 2020-06-02 PROCEDURE — 83605 ASSAY OF LACTIC ACID: CPT

## 2020-06-02 PROCEDURE — 99232 SBSQ HOSP IP/OBS MODERATE 35: CPT

## 2020-06-02 PROCEDURE — 84145 PROCALCITONIN (PCT): CPT

## 2020-06-02 PROCEDURE — 93970 EXTREMITY STUDY: CPT

## 2020-06-02 PROCEDURE — U0003: CPT

## 2020-06-02 PROCEDURE — 36415 COLL VENOUS BLD VENIPUNCTURE: CPT

## 2020-06-02 PROCEDURE — 93925 LOWER EXTREMITY STUDY: CPT

## 2020-06-02 PROCEDURE — 96375 TX/PRO/DX INJ NEW DRUG ADDON: CPT

## 2020-06-02 PROCEDURE — 80202 ASSAY OF VANCOMYCIN: CPT

## 2020-06-02 PROCEDURE — 87086 URINE CULTURE/COLONY COUNT: CPT

## 2020-06-02 PROCEDURE — 85014 HEMATOCRIT: CPT

## 2020-06-02 PROCEDURE — 85027 COMPLETE CBC AUTOMATED: CPT

## 2020-06-02 PROCEDURE — 71275 CT ANGIOGRAPHY CHEST: CPT

## 2020-06-02 PROCEDURE — 82435 ASSAY OF BLOOD CHLORIDE: CPT

## 2020-06-02 PROCEDURE — 96365 THER/PROPH/DIAG IV INF INIT: CPT

## 2020-06-02 PROCEDURE — 94003 VENT MGMT INPAT SUBQ DAY: CPT

## 2020-06-02 PROCEDURE — 84132 ASSAY OF SERUM POTASSIUM: CPT

## 2020-06-02 PROCEDURE — 85730 THROMBOPLASTIN TIME PARTIAL: CPT

## 2020-06-02 PROCEDURE — 87070 CULTURE OTHR SPECIMN AEROBIC: CPT

## 2020-06-02 PROCEDURE — 84295 ASSAY OF SERUM SODIUM: CPT

## 2020-06-02 PROCEDURE — 99291 CRITICAL CARE FIRST HOUR: CPT | Mod: 25

## 2020-06-02 PROCEDURE — 94760 N-INVAS EAR/PLS OXIMETRY 1: CPT

## 2020-06-02 PROCEDURE — 80053 COMPREHEN METABOLIC PANEL: CPT

## 2020-06-02 PROCEDURE — 83735 ASSAY OF MAGNESIUM: CPT

## 2020-06-02 PROCEDURE — 86140 C-REACTIVE PROTEIN: CPT

## 2020-06-02 PROCEDURE — 83615 LACTATE (LD) (LDH) ENZYME: CPT

## 2020-06-02 PROCEDURE — 85610 PROTHROMBIN TIME: CPT

## 2020-06-02 PROCEDURE — 82947 ASSAY GLUCOSE BLOOD QUANT: CPT

## 2020-06-02 PROCEDURE — 84100 ASSAY OF PHOSPHORUS: CPT

## 2020-06-02 PROCEDURE — 85379 FIBRIN DEGRADATION QUANT: CPT

## 2020-06-02 PROCEDURE — 82803 BLOOD GASES ANY COMBINATION: CPT

## 2020-06-02 PROCEDURE — 83036 HEMOGLOBIN GLYCOSYLATED A1C: CPT

## 2020-06-02 RX ORDER — ENOXAPARIN SODIUM 100 MG/ML
40 INJECTION SUBCUTANEOUS
Qty: 0 | Refills: 0 | DISCHARGE

## 2020-06-02 RX ORDER — SUCRALFATE 1 G
10 TABLET ORAL
Qty: 0 | Refills: 0 | DISCHARGE

## 2020-06-02 RX ORDER — INSULIN GLARGINE 100 [IU]/ML
12 INJECTION, SOLUTION SUBCUTANEOUS AT BEDTIME
Refills: 0 | Status: DISCONTINUED | OUTPATIENT
Start: 2020-06-02 | End: 2020-06-03

## 2020-06-02 RX ORDER — INSULIN GLARGINE 100 [IU]/ML
25 INJECTION, SOLUTION SUBCUTANEOUS
Qty: 0 | Refills: 0 | DISCHARGE

## 2020-06-02 RX ADMIN — Medication 4: at 23:20

## 2020-06-02 RX ADMIN — FENTANYL CITRATE 1 PATCH: 50 INJECTION INTRAVENOUS at 07:20

## 2020-06-02 RX ADMIN — FENTANYL CITRATE 1 PATCH: 50 INJECTION INTRAVENOUS at 20:26

## 2020-06-02 RX ADMIN — ENOXAPARIN SODIUM 40 MILLIGRAM(S): 100 INJECTION SUBCUTANEOUS at 11:31

## 2020-06-02 RX ADMIN — INSULIN GLARGINE 12 UNIT(S): 100 INJECTION, SOLUTION SUBCUTANEOUS at 23:19

## 2020-06-02 RX ADMIN — CHLORHEXIDINE GLUCONATE 15 MILLILITER(S): 213 SOLUTION TOPICAL at 17:25

## 2020-06-02 RX ADMIN — Medication 4: at 11:32

## 2020-06-02 RX ADMIN — POLYETHYLENE GLYCOL 3350 17 GRAM(S): 17 POWDER, FOR SOLUTION ORAL at 11:31

## 2020-06-02 RX ADMIN — Medication 4: at 17:25

## 2020-06-02 RX ADMIN — CHLORHEXIDINE GLUCONATE 15 MILLILITER(S): 213 SOLUTION TOPICAL at 06:10

## 2020-06-02 RX ADMIN — PANTOPRAZOLE SODIUM 40 MILLIGRAM(S): 20 TABLET, DELAYED RELEASE ORAL at 11:30

## 2020-06-02 RX ADMIN — Medication 6: at 06:10

## 2020-06-02 RX ADMIN — CHLORHEXIDINE GLUCONATE 1 APPLICATION(S): 213 SOLUTION TOPICAL at 11:32

## 2020-06-02 NOTE — PROGRESS NOTE ADULT - SUBJECTIVE AND OBJECTIVE BOX
Date/Time Patient Seen:  		  Referring MD:   Data Reviewed	       Patient is a 74y old  Female who presents with a chief complaint of RESPIRATORY DISTRESS (01 Jun 2020 14:01)      Subjective/HPI     PAST MEDICAL & SURGICAL HISTORY:  Pneumonia  Quadriplegia  COVID-19 virus detected  Advanced dementia  DM (diabetes mellitus)  HTN (hypertension)  CVA (cerebral vascular accident)  Respiratory failure  Constipation  GERD (gastroesophageal reflux disease)  Hypertension  Respiratory failure  Diabetes mellitus  Aphasic stroke  Dementia of frontal lobe type  Feeding by G-tube  Tracheostomy tube present  Tracheostomy in place  Gastrostomy in place  Hx of appendectomy        Medication list         MEDICATIONS  (STANDING):  chlorhexidine 0.12% Liquid 15 milliLiter(s) Oral Mucosa every 12 hours  chlorhexidine 2% Cloths 1 Application(s) Topical daily  dextrose 5%. 1000 milliLiter(s) (50 mL/Hr) IV Continuous <Continuous>  dextrose 50% Injectable 12.5 Gram(s) IV Push once  dextrose 50% Injectable 25 Gram(s) IV Push once  dextrose 50% Injectable 25 Gram(s) IV Push once  enoxaparin Injectable 40 milliGRAM(s) SubCutaneous daily  fentaNYL   Patch  12 MICROgram(s)/Hr 1 Patch Transdermal every 72 hours  insulin glargine Injectable (LANTUS) 7 Unit(s) SubCutaneous at bedtime  insulin lispro (HumaLOG) corrective regimen sliding scale   SubCutaneous every 6 hours  pantoprazole  Injectable 40 milliGRAM(s) IV Push daily  polyethylene glycol 3350 17 Gram(s) Oral daily  saline laxative (FLEET) Rectal Enema 1 Enema Rectal <User Schedule>    MEDICATIONS  (PRN):  dextrose 40% Gel 15 Gram(s) Oral once PRN Blood Glucose LESS THAN 70 milliGRAM(s)/deciliter  glucagon  Injectable 1 milliGRAM(s) IntraMuscular once PRN Glucose LESS THAN 70 milligrams/deciliter  saline laxative (FLEET) Rectal Enema 1 Enema Rectal daily PRN constipation, to assist in bowel movement         Vitals log        ICU Vital Signs Last 24 Hrs  T(C): 37.5 (02 Jun 2020 05:10), Max: 38.1 (01 Jun 2020 12:00)  T(F): 99.5 (02 Jun 2020 05:10), Max: 100.6 (01 Jun 2020 12:00)  HR: 86 (02 Jun 2020 06:00) (70 - 116)  BP: 136/65 (02 Jun 2020 06:00) (119/69 - 179/75)  BP(mean): 94 (02 Jun 2020 06:00) (83 - 108)  ABP: --  ABP(mean): --  RR: 20 (02 Jun 2020 06:00) (14 - 39)  SpO2: 98% (02 Jun 2020 06:00) (94% - 100%)       Mode: AC/ CMV (Assist Control/ Continuous Mandatory Ventilation)  RR (machine): 14  TV (machine): 420  FiO2: 30  PEEP: 5  ITime: 1  MAP: 12  PIP: 22      Input and Output:  I&O's Detail    01 Jun 2020 07:01  -  02 Jun 2020 07:00  --------------------------------------------------------  IN:    Enteral Tube Flush: 250 mL    Glucerna 1.5: 1200 mL    Solution: 240 mL  Total IN: 1690 mL    OUT:    Indwelling Catheter - Urethral: 1150 mL  Total OUT: 1150 mL    Total NET: 540 mL          Lab Data                        9.5    6.33  )-----------( 269      ( 02 Jun 2020 05:50 )             32.0     06-02    151<H>  |  117<H>  |  27<H>  ----------------------------<  252<H>  4.2   |  26  |  0.82    Ca    8.8      02 Jun 2020 05:50  Phos  2.5     06-02  Mg     2.5     06-02    TPro  7.0  /  Alb  2.6<L>  /  TBili  0.3  /  DBili  x   /  AST  14<L>  /  ALT  18  /  AlkPhos  88  06-02            Review of Systems	      Objective     Physical Examination    heart s1s2  lung dec BS  abd soft      Pertinent Lab findings & Imaging      Annalise:  NO   Adequate UO     I&O's Detail    01 Jun 2020 07:01  -  02 Jun 2020 07:00  --------------------------------------------------------  IN:    Enteral Tube Flush: 250 mL    Glucerna 1.5: 1200 mL    Solution: 240 mL  Total IN: 1690 mL    OUT:    Indwelling Catheter - Urethral: 1150 mL  Total OUT: 1150 mL    Total NET: 540 mL               Discussed with:     Cultures:	        Radiology

## 2020-06-02 NOTE — PROGRESS NOTE ADULT - ASSESSMENT
74 Female transferred from  [facility] center to [place] ED with report of sepsis, pneumonia, RYAN, patient on chronic trach collar , recent admission to [facility]  5/4/20 to 5/18/20 for hypoxia, pneumonia, COVID positive 04/27/20 ,developed hypoxia and was placed on respiratory support ,required ICU admission with shock and was placed on pressors  .Treated for UTI and pseudomonal pneumonia ,s/p cefepime ,had elevation of D-dimers .CTT was negative for PE , Doppler neg for DVT .Seen by neurologist for sz -like activity ,neurologist impression was that it was related to hypoxia CT Brain was negative for CVA .COVID  tested negative on 05/11 and 05/12  Patient had been sent to ER for respiratory distress ,associated with hypotension tachycardia ,tachypnea and diaphoresis  .Patient has a hx significant for Advanced dementia  ,Aphasic stroke  ,Constipation  ,COVID-19 virus detected  ,COPD ,Anxiety ,Dysphagia ,hyperkalemia ,pneumonia ,pulmonary edema , CVA (cerebral vascular accident)  ,Dementia of frontal lobe type  ,Diabetes mellitus  ,DM (diabetes mellitus)  ,GERD (gastroesophageal reflux disease)  ,HTN (hypertension)  ,Hypertension  ,Pneumonia  ,Quadriplegia  ,Respiratory failure  ,Feeding by G-tube  ,Gastrostomy in place  ,Hx of appendectomy  ,Tracheostomy in place  ,Tracheostomy tube present ,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 Admitted to ICU ,seen by pulm cons Dr Sandoval on admission to [place] ED ( transferred from [facility])and was admitted to ICU  for septic workup and evaluation ,send blood and urine cx, serial lactate levels ,monitor vitals closely hydration ,monitor urine output and renal profile ,iv abx initiated Palliative care consult requested ,to discuss advance directives and complete MOLST Patient was diagnosed with sepsis 2/2 to pneumonia and  called ER respiratory and  ICU ,patient  already received Cefepime 2gm iv, vancomycin  1gm iv, rectal Tylenol 650mg, LR x 1 liter, NS x 1 liter in [place] ED .Further plan of care as per pulmonologist Dr Sandoval and icu team

## 2020-06-02 NOTE — PROGRESS NOTE ADULT - ASSESSMENT
VIRIDIANAAMI BREA  1945 DOA 2020 DR ILIANA KEMP     REVIEW OF SYMPTOMS      Able to give ROS  NO     PHYSICAL EXAM    HEENT Unremarkable PERRLA atraumatic   RESP Fair air entry EXP prolonged    Harsh breath sound Resp distres mild   CARDIAC S1 S2 No S3     NO JVD    ABDOMEN SOFT BS PRESENT NOT DISTENDED No hepatosplenomegaly PEDAL EDEMA present No calf tenderness  NO rash   GENERAL Not TOXIC looking    OXYGENATION        VITALS/LABS   2020 100f 80 140/60   2020 ac 14/420/5/.3   2020 w 6.3 Hb 9.5 Plt 269     PT DATA/BEST PRACTICE  ALLERGY         codeine             WT     63 (2020)                                 BMI     23 (2020)                        CrCl                                  ADVANCED DIRECTIVE     HEAD OF BED ELEVATION Yes      INFECTION PPLX     Chlorhexidine 2% ()   Chlorhexidine .12% ()   DVT PROPHYLAXIS   hpsc ()  --> lvnx 40 ()               SQUIRES PROPHYLAXIS    Protonix 40 ()       DYSPHAGIA EVAL     DIET     npo () --> TF Flucerna 1.5 1100 (529)    FREE WATER 300.4 ()                                                                  IV F    (-)   ABIO   zosyn (-)   Vanco 1.2 (-)     COVID PCR  N-vinnie   COVID PCR  N-vinnie     W ---2020 W 21-8.9 -7-8/7  PROC 2020 PC 2.9  CXR  BL infiltr  MICROBIO    MRSA N-VINNIE    Sp Few GNR Pseudomonas    Urine C N-VINNIE  Blod C N-VINNIE     LA --2020 LA 10.5-2.6 - 7.9     D-DIMR 2020 D-dimr 668  DUPLX 2020 V Duplex N-vinnie                                    Hb --- Hb 11-9.5-9.2-9.8     Na 2020 Na 151   K -2020 K 6-4.6   Cr 5/10- --2020 Cr 1-1.3-.7 -.8    AMS  CT   CT H N-vinnie     ABG   2020 1a ac 14/420/35%/5 730/51/117                               PATIENT SUMMARY     74 f PMH aphasic stroke functional quad dementia GERD trach vent dependent recently hospitalisd [facility] was COVID P+vinnie 2020 was admitted 2020 for dyspnea possible sepsis Pulm consulted     home meds psyllium feso4 insulin fentanyl 12 glarigine 20 lvnx 40                                          [facility] ADMISSION -[facility]       PATIENT DATA/ASSESSMENT/RECOMMENDATIONS     RESP DISTRESS POA   Possibly  sec to Seizures vs Ventilator associated pneumonia (WBC Infiltrates)  Improved    MECHANICAL VENT   Monitor abg Target po 90-95% Vent protocol Trach care   Infection ppx  Gas exchange   showed resp acidosis     INFECTION  COVID  PCR N-VINNIE    FEVER POSSIBLE PNEUMONIA POA 2020   HO recent hospital stay   On vent Leukocytosis poa   Vanco zosyn  DCed 2020 as cultures N-VINNIE   sputum c showed Pseudomonas This is likely colonization   Dr Olivas on case    LACTICEMIA POA   Cause of LA may have been unwitnessed sz or sepsis   IV fluids and monitor serially       ELEVATED D-DIMR POA   ELEVATED PTT POA   Was on lovenox pplx at [facility]  2020 Suggest Check  V duplex  W  2020 Check  cta ch     HYPERNATREMIA NOTED 2020   started on fw 2020     RYAN POA   IV fluids   Improved    ANEMIA   Hb stable     DM   sl scale     RO SEIZURE   CT H N-vinnie     PLAN   Infection felt to be unlikely and abio dced   FW started 2020 for elevated Na     TIME SPENT Over 25 minutes aggregate care time spent on encounter; activities included   direct pt care, counseling and/or coordinating care reviewing notes, lab data/ imaging , discussion with multidisciplinary team/ pt /family. Risks, benefits, alternatives  discussed in detail.    CHAPINCITO GUIDRY  1945 DOA 2020 DR ILIANA KEMP

## 2020-06-02 NOTE — PROGRESS NOTE ADULT - SUBJECTIVE AND OBJECTIVE BOX
Neurology follow up note    BREA [id number]      Interval History:    Patient resting in bed    MEDICATIONS    chlorhexidine 0.12% Liquid 15 milliLiter(s) Oral Mucosa every 12 hours  chlorhexidine 2% Cloths 1 Application(s) Topical daily  dextrose 40% Gel 15 Gram(s) Oral once PRN  dextrose 5%. 1000 milliLiter(s) IV Continuous <Continuous>  dextrose 50% Injectable 12.5 Gram(s) IV Push once  dextrose 50% Injectable 25 Gram(s) IV Push once  dextrose 50% Injectable 25 Gram(s) IV Push once  enoxaparin Injectable 40 milliGRAM(s) SubCutaneous daily  fentaNYL   Patch  12 MICROgram(s)/Hr 1 Patch Transdermal every 72 hours  glucagon  Injectable 1 milliGRAM(s) IntraMuscular once PRN  insulin glargine Injectable (LANTUS) 12 Unit(s) SubCutaneous at bedtime  insulin lispro (HumaLOG) corrective regimen sliding scale   SubCutaneous every 6 hours  pantoprazole  Injectable 40 milliGRAM(s) IV Push daily  polyethylene glycol 3350 17 Gram(s) Oral daily  saline laxative (FLEET) Rectal Enema 1 Enema Rectal <User Schedule>  saline laxative (FLEET) Rectal Enema 1 Enema Rectal daily PRN      Allergies    codeine (Hives)    Intolerances            Vital Signs Last 24 Hrs  T(C): 37.4 (02 Jun 2020 12:00), Max: 37.8 (02 Jun 2020 07:28)  T(F): 99.4 (02 Jun 2020 12:00), Max: 100.1 (02 Jun 2020 07:28)  HR: 81 (02 Jun 2020 13:08) (70 - 111)  BP: 133/65 (02 Jun 2020 13:00) (131/65 - 187/85)  BP(mean): 94 (02 Jun 2020 13:00) (92 - 122)  RR: 19 (02 Jun 2020 13:00) (14 - 35)  SpO2: 100% (02 Jun 2020 13:08) (93% - 100%)    REVIEW OF SYSTEMS:  Could not be obtained secondary to the patient being nonverbal.  As per my conversation with the spouse, a gradual process along with underlying strokes causing her to become bed-bound.    PHYSICAL EXAMINATION:    Head:  Normocephalic, atraumatic.  Eyes:  No scleral icterus.  Ears:  Cannot be evaluated secondary to the patient being nonverbal.  NECK:  Tracheostomy was in place.  RESPIRATORY:  Good air entry bilaterally.  CARDIOVASCULAR:  S1 and S2 heard.  ABDOMEN:  Soft and nontender.  EXTREMITIES:  No clubbing or cyanosis were noted.      NEUROLOGIC:  The patient was awake in bed.  Upon stimulating the patient, her eyes did roll up.  Her body started to stiffen with abnormal mouth movements, lasted one to two minutes.  Pupils bilaterally were 3 mm, reactive to 2 mm.  The patient has her arms in a flexed position with both hands contracted.  Bilateral lower extremities were in a straight position.  The patient has increased tone, bilateral upper and lower extremities.              LABS:  CBC Full  -  ( 02 Jun 2020 05:50 )  WBC Count : 6.33 K/uL  RBC Count : 3.55 M/uL  Hemoglobin : 9.5 g/dL  Hematocrit : 32.0 %  Platelet Count - Automated : 269 K/uL  Mean Cell Volume : 90.1 fl  Mean Cell Hemoglobin : 26.8 pg  Mean Cell Hemoglobin Concentration : 29.7 gm/dL  Auto Neutrophil # : 4.37 K/uL  Auto Lymphocyte # : 1.14 K/uL  Auto Monocyte # : 0.57 K/uL  Auto Eosinophil # : 0.06 K/uL  Auto Basophil # : 0.00 K/uL  Auto Neutrophil % : 65.0 %  Auto Lymphocyte % : 18.0 %  Auto Monocyte % : 9.0 %  Auto Eosinophil % : 1.0 %  Auto Basophil % : 0.0 %      06-02    151<H>  |  117<H>  |  27<H>  ----------------------------<  252<H>  4.2   |  26  |  0.82    Ca    8.8      02 Jun 2020 05:50  Phos  2.5     06-02  Mg     2.5     06-02    TPro  7.0  /  Alb  2.6<L>  /  TBili  0.3  /  DBili  x   /  AST  14<L>  /  ALT  18  /  AlkPhos  88  06-02    Hemoglobin A1C:     LIVER FUNCTIONS - ( 02 Jun 2020 05:50 )  Alb: 2.6 g/dL / Pro: 7.0 g/dL / ALK PHOS: 88 U/L / ALT: 18 U/L / AST: 14 U/L / GGT: x           Vitamin B12         RADIOLOGY    ANALYSIS AND PLAN:  A 74-year-old with abnormal movements.  1.	For abnormal stiffening, suspect most likely secondary to a history of underlying neurodegenerative type process with degeneration of the patient's brain parenchyma, causing her to have these abnormal movements.  As per my conversation with the spouse, this has been going on for over five years.  She has had over 200 of these events, and as per my conversation with him, he did have EEG prolonged monitoring which recorded these events, they were not seizure activities.  2.	No need for any antiepileptic medications.  3.	For respiratory failure, monitor respiratory status.  4.	Monitor the patient's systolic blood pressure.  5.	Monitor electrolytes.  6.	Spoke with the spouse in great detail, Marciano.  7.	From neurology standpoint only, the patient is stable.  8.	Advanced care directives were discussed with the spouse.  9.	spoke to spouse yesterday about possible medications to add medications for these movement he is refusing     Greater than 34 minutes spent in direct patient care reviewing  the notes, lab data/ imaging , discussion with multidisciplinary team.

## 2020-06-02 NOTE — PROGRESS NOTE ADULT - PROBLEM SELECTOR PLAN 2
(resolved )2/2 to probable  pneumonia ,poa -was on vanco and zosyn ,seen by ID CONSULT-  watch off abx ,  pseudomonas in sputum likely colonized - monitor off Abx; repeated  Covid negative

## 2020-06-02 NOTE — PROGRESS NOTE ADULT - ATTENDING COMMENTS
74F h/o frontotemporal dementia, chronic vent dependent respiratory failure, bedbound presenting with respiratory distress, diaphoresis and generalized spasm of extremities concerning for seizure activity vs sepsis without recurrent episodes in ICU.     Neuro: no acute issues, no suspicious seizure activity witness - continue to monitor  CV: hemodynamically stable  Pulm: chronic vent dependent respiratory failure, continue full vent support  GI: continue enteral feeds via PEG; continue bowel regimen  Renal: chronic vaughn; free water started for hypernatremia  ID: pseudomonas in sputum likely colonized - monitor off Abx; repeat Covid negative  Endo: remains hyperglycemic despite addition of lantus, will increase to 12 units based off yesterdays ISS requirements, continue moderate insulin coverage scale    dc planning back to SNF .

## 2020-06-02 NOTE — PROGRESS NOTE ADULT - ATTENDING COMMENTS
74 Female transferred from  [facility] center to [place] ED with report of sepsis, pneumonia, RYAN, patient on chronic trach collar , recent admission to [facility]  5/4/20 to 5/18/20 for hypoxia, pneumonia, COVID positive 04/27/20 ,developed hypoxia and was placed on respiratory support ,required ICU admission with shock and was placed on pressors  .Treated for UTI and pseudomonal pneumonia ,s/p cefepime ,had elevation of D-dimers .CTT was negative for PE , Doppler neg for DVT .Seen by neurologist for sz -like activity ,neurologist impression was that it was related to hypoxia CT Brain was negative for CVA .COVID  tested negative on 05/11 and 05/12  Patient had been sent to ER for respiratory distress ,associated with hypotension tachycardia ,tachypnea and diaphoresis  .Patient has a hx significant for Advanced dementia  ,Aphasic stroke  ,Constipation  ,COVID-19 virus detected  ,COPD ,Anxiety ,Dysphagia ,hyperkalemia ,pneumonia ,pulmonary edema , CVA (cerebral vascular accident)  ,Dementia of frontal lobe type  ,Diabetes mellitus  ,DM (diabetes mellitus)  ,GERD (gastroesophageal reflux disease)  ,HTN (hypertension)  ,Hypertension  ,Pneumonia  ,Quadriplegia  ,Respiratory failure  ,Feeding by G-tube  ,Gastrostomy in place  ,Hx of appendectomy  ,Tracheostomy in place  ,Tracheostomy tube present ,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 Admitted to ICU ,seen by pulm cons Dr Rojelio Sandoval on admission to [place] ED ( transferred from [facility])and was admitted to ICU  for septic workup and evaluation ,send blood and urine cx, serial lactate levels ,monitor vitals closely hydration ,monitor urine output and renal profile ,iv abx initiated . Palliative care consult requested ,to discuss advance directives and complete MOLST .Patient was diagnosed with sepsis 2/2 to pneumonia and  called ER respiratory and  ICU ,patient  already received Cefepime 2gm iv, vancomycin  1gm iv, rectal Tylenol 650mg, LR x 1 liter, NS x 1 liter in [place] ED . Treated for sepsis (resolved )2/2 to probable  pneumonia ,poa -was on vanco and zosyn ,seen by ID CONSULT-  watch off abx ,  pseudomonas in sputum likely colonized - monitor off Abx; repeated  Covid negative CLEARED FOR RETURN TO [facility] 06/02/20 ,as per social service  is not in agreement with plan and would like to discuss other placement options and appealing discharge .

## 2020-06-02 NOTE — PROGRESS NOTE ADULT - SUBJECTIVE AND OBJECTIVE BOX
BREA BECKHAM    [facility] ICU1 02    Patient is a 74y old  Female who presents with a chief complaint of RESPIRATORY DISTRESS (02 Jun 2020 07:54)       Allergies    codeine (Hives)    Intolerances        HPI:  74 Female transferred from  [facility] to [place] ED with report of sepsis, pneumonia, RYAN, patient on chronic trach collar , recent admission to [facility]  5/4/20 to 5/18/20 for hypoxia, pneumonia, COVID positive 04/27/20 ,developed hypoxia and was placed on respiratory support ,required ICU admission with shock and was placed on pressors  .Treated for UTI and pseudomonal pneumonia ,s/p cefepime ,had elevation of D-dimers .CTT was negative for PE , Doppler neg for DVT .Seen by neurologist for sz -like activity ,neurologist impression was that it was related to hypoxia CT Brain was negative for CVA .COVID  tested negative on 05/11 and 05/12  Patient had been sent to ER for respiratory distress ,associated with hypotension tachycardia ,tachypnea and diaphoresis  .Patient has a hx significant for Advanced dementia  ,Aphasic stroke  ,Constipation  ,COVID-19 virus detected  ,COPD ,Anxiety ,Dysphagia ,hyperkalemia ,pneumonia ,pulmonary edema , CVA (cerebral vascular accident)  ,Dementia of frontal lobe type  ,Diabetes mellitus  ,DM (diabetes mellitus)  ,GERD (gastroesophageal reflux disease)  ,HTN (hypertension)  ,Hypertension  ,Pneumonia  ,Quadriplegia  ,Respiratory failure  ,Feeding by G-tube  ,Gastrostomy in place  ,Hx of appendectomy  ,Tracheostomy in place  ,Tracheostomy tube present, 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 Admitted to ICU ,seen by pulm cons Dr Sandoval on admission to [place] ED ( transferred from [facility])and was admitted to ICU  for septic workup and evaluation ,send blood and urine cx, serial lactate levels ,monitor vitals closely hydration ,monitor urine output and renal profile ,iv abx initiated Palliative care consult requested ,to discuss advance directives and complete MOLST .Patient was diagnosed with sepsis 2/2 to pneumonia and  called ER respiratory and  ICU ,patient  already received Cefepime 2gm iv, vancomycin  1gm iv, rectal Tylenol 650mg, LR x 1 liter, NS x 1 liter in [place] ED .Further plan of care as per pulmonologist Dr Sandoval and icu team (28 May 2020 23:07)      PAST MEDICAL & SURGICAL HISTORY:  Pneumonia  Quadriplegia  COVID-19 virus detected  Advanced dementia  DM (diabetes mellitus)  HTN (hypertension)  CVA (cerebral vascular accident)  Respiratory failure  Constipation  GERD (gastroesophageal reflux disease)  Hypertension  Respiratory failure  Diabetes mellitus  Aphasic stroke  Dementia of frontal lobe type  Feeding by G-tube  Tracheostomy tube present  Tracheostomy in place  Gastrostomy in place  Hx of appendectomy      FAMILY HISTORY:  No pertinent family history in first degree relatives        MEDICATIONS   chlorhexidine 0.12% Liquid 15 milliLiter(s) Oral Mucosa every 12 hours  chlorhexidine 2% Cloths 1 Application(s) Topical daily  dextrose 40% Gel 15 Gram(s) Oral once PRN  dextrose 5%. 1000 milliLiter(s) IV Continuous <Continuous>  dextrose 50% Injectable 12.5 Gram(s) IV Push once  dextrose 50% Injectable 25 Gram(s) IV Push once  dextrose 50% Injectable 25 Gram(s) IV Push once  enoxaparin Injectable 40 milliGRAM(s) SubCutaneous daily  fentaNYL   Patch  12 MICROgram(s)/Hr 1 Patch Transdermal every 72 hours  glucagon  Injectable 1 milliGRAM(s) IntraMuscular once PRN  insulin glargine Injectable (LANTUS) 12 Unit(s) SubCutaneous at bedtime  insulin lispro (HumaLOG) corrective regimen sliding scale   SubCutaneous every 6 hours  pantoprazole  Injectable 40 milliGRAM(s) IV Push daily  polyethylene glycol 3350 17 Gram(s) Oral daily  saline laxative (FLEET) Rectal Enema 1 Enema Rectal <User Schedule>  saline laxative (FLEET) Rectal Enema 1 Enema Rectal daily PRN      Vital Signs Last 24 Hrs  T(C): 37.8 (02 Jun 2020 07:28), Max: 38.1 (01 Jun 2020 12:00)  T(F): 100.1 (02 Jun 2020 07:28), Max: 100.6 (01 Jun 2020 12:00)  HR: 72 (02 Jun 2020 07:43) (70 - 103)  BP: 143/65 (02 Jun 2020 07:00) (131/65 - 179/75)  BP(mean): 93 (02 Jun 2020 07:00) (92 - 108)  RR: 15 (02 Jun 2020 07:00) (14 - 26)  SpO2: 93% (02 Jun 2020 07:43) (93% - 100%)      06-01-20 @ 07:01  -  06-02-20 @ 07:00  --------------------------------------------------------  IN: 1690 mL / OUT: 1200 mL / NET: 490 mL    06-02-20 @ 07:01  -  06-02-20 @ 09:43  --------------------------------------------------------  IN: 50 mL / OUT: 50 mL / NET: 0 mL        Mode: AC/ CMV (Assist Control/ Continuous Mandatory Ventilation), RR (machine): 14, TV (machine): 420, FiO2: 30, PEEP: 5, ITime: 1, MAP: 11, PIP: 23    LABS:                        9.5    6.33  )-----------( 269      ( 02 Jun 2020 05:50 )             32.0     06-02    151<H>  |  117<H>  |  27<H>  ----------------------------<  252<H>  4.2   |  26  |  0.82    Ca    8.8      02 Jun 2020 05:50  Phos  2.5     06-02  Mg     2.5     06-02    TPro  7.0  /  Alb  2.6<L>  /  TBili  0.3  /  DBili  x   /  AST  14<L>  /  ALT  18  /  AlkPhos  88  06-02              WBC:  WBC Count: 6.33 K/uL (06-02 @ 05:50)  WBC Count: 8.70 K/uL (06-01 @ 05:43)  WBC Count: 6.78 K/uL (05-31 @ 05:55)  WBC Count: 7.08 K/uL (05-30 @ 04:58)      MICROBIOLOGY:  RECENT CULTURES:  05-29 .Sputum Sputum Pseudomonas aeruginosa   Few polymorphonuclear leukocytes per low power field  Few Squamous epithelial cells per low power field  Few Gram Negative Rods per oil power field   Few Pseudomonas aeruginosa  Normal Respiratory Elvira present    05-29 .Urine Catheterized XXXX XXXX   <10,000 CFU/mL Normal Urogenital Elvira    05-29 .Blood Blood-Peripheral XXXX XXXX   No growth to date.                    Sodium:  Sodium, Serum: 151 mmol/L (06-02 @ 05:50)  Sodium, Serum: 144 mmol/L (06-01 @ 05:43)  Sodium, Serum: 143 mmol/L (05-31 @ 05:55)  Sodium, Serum: 142 mmol/L (05-30 @ 04:58)      0.82 mg/dL 06-02 @ 05:50  0.88 mg/dL 06-01 @ 05:43  0.77 mg/dL 05-31 @ 05:55  0.84 mg/dL 05-30 @ 04:58      Hemoglobin:  Hemoglobin: 9.5 g/dL (06-02 @ 05:50)  Hemoglobin: 9.8 g/dL (06-01 @ 05:43)  Hemoglobin: 9.2 g/dL (05-31 @ 05:55)  Hemoglobin: 9.2 g/dL (05-30 @ 04:58)      Platelets: Platelet Count - Automated: 269 K/uL (06-02 @ 05:50)  Platelet Count - Automated: 254 K/uL (06-01 @ 05:43)  Platelet Count - Automated: 233 K/uL (05-31 @ 05:55)  Platelet Count - Automated: 214 K/uL (05-30 @ 04:58)      LIVER FUNCTIONS - ( 02 Jun 2020 05:50 )  Alb: 2.6 g/dL / Pro: 7.0 g/dL / ALK PHOS: 88 U/L / ALT: 18 U/L / AST: 14 U/L / GGT: x                 RADIOLOGY & ADDITIONAL STUDIES:

## 2020-06-02 NOTE — PROGRESS NOTE ADULT - SUBJECTIVE AND OBJECTIVE BOX
PROGRESS NOTE  Patient is a 74y old  Female who presents with a chief complaint of RESPIRATORY DISTRESS (02 Jun 2020 07:47)  Chart and available morning labs /imaging are reviewed electronically , urgent issues addressed . More information  is being added upon completion of rounds , when more information is collected and management discussed with consultants , medical staff and social service/case management on the floor     OVERNIGHT      HPI:  74 Female transferred from  [facility] to [place] ED with report of sepsis, pneumonia, RYAN, patient on chronic trach collar , recent admission to [facility]  5/4/20 to 5/18/20 for hypoxia, pneumonia, COVID positive 04/27/20 ,developed hypoxia and was placed on respiratory support ,required ICU admission with shock and was placed on pressors  .Treated for UTI and pseudomonal pneumonia ,s/p cefepime ,had elevation of D-dimers .CTT was negative for PE , Doppler neg for DVT .Seen by neurologist for sz -like activity ,neurologist impression was that it was related to hypoxia CT Brain was negative for CVA .COVID  tested negative on 05/11 and 05/12  Patient had been sent to ER for respiratory distress ,associated with hypotension tachycardia ,tachypnea and diaphoresis  .Patient has a hx significant for Advanced dementia  ,Aphasic stroke  ,Constipation  ,COVID-19 virus detected  ,COPD ,Anxiety ,Dysphagia ,hyperkalemia ,pneumonia ,pulmonary edema , CVA (cerebral vascular accident)  ,Dementia of frontal lobe type  ,Diabetes mellitus  ,DM (diabetes mellitus)  ,GERD (gastroesophageal reflux disease)  ,HTN (hypertension)  ,Hypertension  ,Pneumonia  ,Quadriplegia  ,Respiratory failure  ,Feeding by G-tube  ,Gastrostomy in place  ,Hx of appendectomy  ,Tracheostomy in place  ,Tracheostomy tube present, 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 Admitted to ICU ,seen by pulm cons Dr Sandoval on admission to [place] ED ( transferred from [facility])and was admitted to ICU  for septic workup and evaluation ,send blood and urine cx, serial lactate levels ,monitor vitals closely hydration ,monitor urine output and renal profile ,iv abx initiated Palliative care consult requested ,to discuss advance directives and complete MOLST .Patient was diagnosed with sepsis 2/2 to pneumonia and  called ER respiratory and  ICU ,patient  already received Cefepime 2gm iv, vancomycin  1gm iv, rectal Tylenol 650mg, LR x 1 liter, NS x 1 liter in [place] ED .Further plan of care as per pulmonologist Dr Sandoval and icu team (28 May 2020 23:07)    PAST MEDICAL & SURGICAL HISTORY:  Pneumonia  Quadriplegia  COVID-19 virus detected  Advanced dementia  DM (diabetes mellitus)  HTN (hypertension)  CVA (cerebral vascular accident)  Respiratory failure  Constipation  GERD (gastroesophageal reflux disease)  Hypertension  Respiratory failure  Diabetes mellitus  Aphasic stroke  Dementia of frontal lobe type  Feeding by G-tube  Tracheostomy tube present  Tracheostomy in place  Gastrostomy in place  Hx of appendectomy      MEDICATIONS  (STANDING):  chlorhexidine 0.12% Liquid 15 milliLiter(s) Oral Mucosa every 12 hours  chlorhexidine 2% Cloths 1 Application(s) Topical daily  dextrose 5%. 1000 milliLiter(s) (50 mL/Hr) IV Continuous <Continuous>  dextrose 50% Injectable 12.5 Gram(s) IV Push once  dextrose 50% Injectable 25 Gram(s) IV Push once  dextrose 50% Injectable 25 Gram(s) IV Push once  enoxaparin Injectable 40 milliGRAM(s) SubCutaneous daily  fentaNYL   Patch  12 MICROgram(s)/Hr 1 Patch Transdermal every 72 hours  insulin glargine Injectable (LANTUS) 7 Unit(s) SubCutaneous at bedtime  insulin lispro (HumaLOG) corrective regimen sliding scale   SubCutaneous every 6 hours  pantoprazole  Injectable 40 milliGRAM(s) IV Push daily  polyethylene glycol 3350 17 Gram(s) Oral daily  saline laxative (FLEET) Rectal Enema 1 Enema Rectal <User Schedule>    MEDICATIONS  (PRN):  dextrose 40% Gel 15 Gram(s) Oral once PRN Blood Glucose LESS THAN 70 milliGRAM(s)/deciliter  glucagon  Injectable 1 milliGRAM(s) IntraMuscular once PRN Glucose LESS THAN 70 milligrams/deciliter  saline laxative (FLEET) Rectal Enema 1 Enema Rectal daily PRN constipation, to assist in bowel movement      OBJECTIVE    T(C): 37.8 (06-02-20 @ 07:28), Max: 38.1 (06-01-20 @ 12:00)  HR: 72 (06-02-20 @ 07:43) (70 - 116)  BP: 143/65 (06-02-20 @ 07:00) (119/69 - 179/75)  RR: 15 (06-02-20 @ 07:00) (14 - 39)  SpO2: 93% (06-02-20 @ 07:43) (93% - 100%)  Wt(kg): --  I&O's Summary    01 Jun 2020 07:01  -  02 Jun 2020 07:00  --------------------------------------------------------  IN: 1690 mL / OUT: 1150 mL / NET: 540 mL          REVIEW OF SYSTEMS:  CONSTITUTIONAL: No fever, weight loss, or fatigue  EYES: No eye pain, visual disturbances, or discharge  ENMT:   No sinus or throat pain  NECK: No pain or stiffness  RESPIRATORY: No cough, wheezing, chills or hemoptysis; No shortness of breath  CARDIOVASCULAR: No chest pain, palpitations, dizziness, or leg swelling  GASTROINTESTINAL: No abdominal pain. No nausea, vomiting; No diarrhea or constipation. No melena or hematochezia.  GENITOURINARY: No dysuria, frequency, hematuria, or incontinence  NEUROLOGICAL: No headaches, memory loss, loss of strength, numbness, or tremors  SKIN: No itching, burning, rashes, or lesions   MUSCULOSKELETAL: No joint pain or swelling; No muscle, back, or extremity pain    PHYSICAL EXAM:  Appearance: NAD. VS past 24 hrs -as above   HEENT:   Moist oral mucosa. Conjunctiva clear b/l.   Neck : supple  Respiratory: Lungs CTAB.  Gastrointestinal:  Soft, nontender. No rebound. No rigidity. BS present	  Cardiovascular: RRR ,S1S2 present  Neurologic: Non-focal. Moving all extremities.  Extremities: No edema. No erythema. No calf tenderness.  Skin: No rashes, No ecchymoses, No cyanosis.	  wounds ,skin lesions-See skin assesment flow sheet   LABS:                        9.5    6.33  )-----------( 269      ( 02 Jun 2020 05:50 )             32.0     06-02    151<H>  |  117<H>  |  27<H>  ----------------------------<  252<H>  4.2   |  26  |  0.82    Ca    8.8      02 Jun 2020 05:50  Phos  2.5     06-02  Mg     2.5     06-02    TPro  7.0  /  Alb  2.6<L>  /  TBili  0.3  /  DBili  x   /  AST  14<L>  /  ALT  18  /  AlkPhos  88  06-02    CAPILLARY BLOOD GLUCOSE      POCT Blood Glucose.: 253 mg/dL (02 Jun 2020 06:06)  POCT Blood Glucose.: 193 mg/dL (01 Jun 2020 22:59)  POCT Blood Glucose.: 257 mg/dL (01 Jun 2020 17:19)  POCT Blood Glucose.: 218 mg/dL (01 Jun 2020 11:46)          Culture - Sputum (collected 29 May 2020 21:16)  Source: .Sputum Sputum  Gram Stain (29 May 2020 22:19):    Few polymorphonuclear leukocytes per low power field    Few Squamous epithelial cells per low power field    Few Gram Negative Rods per oil power field  Final Report (31 May 2020 16:00):    Few Pseudomonas aeruginosa    Normal Respiratory Elvira present  Organism: Pseudomonas aeruginosa (31 May 2020 16:00)  Organism: Pseudomonas aeruginosa (31 May 2020 16:00)    Culture - Urine (collected 29 May 2020 01:06)  Source: .Urine Catheterized  Final Report (30 May 2020 07:47):    <10,000 CFU/mL Normal Urogenital Elvira    Culture - Blood (collected 29 May 2020 00:35)  Source: .Blood Blood-Peripheral  Preliminary Report (30 May 2020 01:02):    No growth to date.    Culture - Blood (collected 29 May 2020 00:35)  Source: .Blood Blood-Peripheral  Preliminary Report (30 May 2020 01:02):    No growth to date.      RADIOLOGY & ADDITIONAL TESTS:   reviewed elctronically  ASSESSMENT/PLAN: PROGRESS NOTE  Patient is a 74y old  Female who presents with a chief complaint of RESPIRATORY DISTRESS (02 Jun 2020 07:47)  Chart and available morning labs /imaging are reviewed electronically , urgent issues addressed . More information  is being added upon completion of rounds , when more information is collected and management discussed with consultants , medical staff and social service/case management on the floor   OVERNIGHT  24 hour events: No acute events overnight.   HPI:  74 Female transferred from  [facility] to [place] ED with report of sepsis, pneumonia, RYAN, patient on chronic trach collar , recent admission to [facility]  5/4/20 to 5/18/20 for hypoxia, pneumonia, COVID positive 04/27/20 ,developed hypoxia and was placed on respiratory support ,required ICU admission with shock and was placed on pressors  .Treated for UTI and pseudomonal pneumonia ,s/p cefepime ,had elevation of D-dimers .CTT was negative for PE , Doppler neg for DVT .Seen by neurologist for sz -like activity ,neurologist impression was that it was related to hypoxia CT Brain was negative for CVA .COVID  tested negative on 05/11 and 05/12  Patient had been sent to ER for respiratory distress ,associated with hypotension tachycardia ,tachypnea and diaphoresis  .Patient has a hx significant for Advanced dementia  ,Aphasic stroke  ,Constipation  ,COVID-19 virus detected  ,COPD ,Anxiety ,Dysphagia ,hyperkalemia ,pneumonia ,pulmonary edema , CVA (cerebral vascular accident)  ,Dementia of frontal lobe type  ,Diabetes mellitus  ,DM (diabetes mellitus)  ,GERD (gastroesophageal reflux disease)  ,HTN (hypertension)  ,Hypertension  ,Pneumonia  ,Quadriplegia  ,Respiratory failure  ,Feeding by G-tube  ,Gastrostomy in place  ,Hx of appendectomy  ,Tracheostomy in place  ,Tracheostomy tube present, 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 Admitted to ICU ,seen by pulm cons Dr Sandoval on admission to [place] ED ( transferred from [facility])and was admitted to ICU  for septic workup and evaluation ,send blood and urine cx, serial lactate levels ,monitor vitals closely hydration ,monitor urine output and renal profile ,iv abx initiated Palliative care consult requested ,to discuss advance directives and complete MOLST .Patient was diagnosed with sepsis 2/2 to pneumonia and  called ER respiratory and  ICU ,patient  already received Cefepime 2gm iv, vancomycin  1gm iv, rectal Tylenol 650mg, LR x 1 liter, NS x 1 liter in [place] ED .Further plan of care as per pulmonologist Dr Sandoval and icu team (28 May 2020 23:07)  PAST MEDICAL & SURGICAL HISTORY:  Pneumonia  Quadriplegia  COVID-19 virus detected  Advanced dementia  DM (diabetes mellitus)  HTN (hypertension)  CVA (cerebral vascular accident)  Respiratory failure  Constipation  GERD (gastroesophageal reflux disease)  Hypertension  Respiratory failure  Diabetes mellitus  Aphasic stroke  Dementia of frontal lobe type  Feeding by G-tube  Tracheostomy tube present  Tracheostomy in place  Gastrostomy in place  Hx of appendectomy  MEDICATIONS  (STANDING):  chlorhexidine 0.12% Liquid 15 milliLiter(s) Oral Mucosa every 12 hours  chlorhexidine 2% Cloths 1 Application(s) Topical daily  dextrose 5%. 1000 milliLiter(s) (50 mL/Hr) IV Continuous <Continuous>  dextrose 50% Injectable 12.5 Gram(s) IV Push once  dextrose 50% Injectable 25 Gram(s) IV Push once  dextrose 50% Injectable 25 Gram(s) IV Push once  enoxaparin Injectable 40 milliGRAM(s) SubCutaneous daily  fentaNYL   Patch  12 MICROgram(s)/Hr 1 Patch Transdermal every 72 hours  insulin glargine Injectable (LANTUS) 7 Unit(s) SubCutaneous at bedtime  insulin lispro (HumaLOG) corrective regimen sliding scale   SubCutaneous every 6 hours  pantoprazole  Injectable 40 milliGRAM(s) IV Push daily  polyethylene glycol 3350 17 Gram(s) Oral daily  saline laxative (FLEET) Rectal Enema 1 Enema Rectal <User Schedule>  MEDICATIONS  (PRN):  dextrose 40% Gel 15 Gram(s) Oral once PRN Blood Glucose LESS THAN 70 milliGRAM(s)/deciliter  glucagon  Injectable 1 milliGRAM(s) IntraMuscular once PRN Glucose LESS THAN 70 milligrams/deciliter  saline laxative (FLEET) Rectal Enema 1 Enema Rectal daily PRN constipation, to assist in bowel movement  OBJECTIVE  T(C): 37.8 (06-02-20 @ 07:28), Max: 38.1 (06-01-20 @ 12:00)  HR: 72 (06-02-20 @ 07:43) (70 - 116)  BP: 143/65 (06-02-20 @ 07:00) (119/69 - 179/75)  RR: 15 (06-02-20 @ 07:00) (14 - 39)  SpO2: 93% (06-02-20 @ 07:43) (93% - 100%)  Wt(kg): --  I&O's Summary  01 Jun 2020 07:01  -  02 Jun 2020 07:00  --------------------------------------------------------  IN: 1690 mL / OUT: 1150 mL / NET: 540 mL  REVIEW OF SYSTEMS :ros is unobtainable due to aphasia   PHYSICAL EXAM  General: NAD  HEENT: +trach  Resp: DC BS b/l  CVS: RRR  Abd: soft, NT/ND peg  Ext: no edema  Skin: warm well perfused  LABS:                        9.5    6.33  )-----------( 269      ( 02 Jun 2020 05:50 )             32.0     06-02    151<H>  |  117<H>  |  27<H>  ----------------------------<  252<H>  4.2   |  26  |  0.82    Ca    8.8      02 Jun 2020 05:50  Phos  2.5     06-02  Mg     2.5     06-02    TPro  7.0  /  Alb  2.6<L>  /  TBili  0.3  /  DBili  x   /  AST  14<L>  /  ALT  18  /  AlkPhos  88  06-02    CAPILLARY BLOOD GLUCOSE      POCT Blood Glucose.: 253 mg/dL (02 Jun 2020 06:06)  POCT Blood Glucose.: 193 mg/dL (01 Jun 2020 22:59)  POCT Blood Glucose.: 257 mg/dL (01 Jun 2020 17:19)  POCT Blood Glucose.: 218 mg/dL (01 Jun 2020 11:46)  Culture - Sputum (collected 29 May 2020 21:16)  Source: .Sputum Sputum  Gram Stain (29 May 2020 22:19):    Few polymorphonuclear leukocytes per low power field    Few Squamous epithelial cells per low power field    Few Gram Negative Rods per oil power field  Final Report (31 May 2020 16:00):    Few Pseudomonas aeruginosa    Normal Respiratory Elvira present  Organism: Pseudomonas aeruginosa (31 May 2020 16:00)  Organism: Pseudomonas aeruginosa (31 May 2020 16:00)  Culture - Urine (collected 29 May 2020 01:06)  Source: .Urine Catheterized  Final Report (30 May 2020 07:47):    <10,000 CFU/mL Normal Urogenital Elvira  Culture - Blood (collected 29 May 2020 00:35)  Source: .Blood Blood-Peripheral  Preliminary Report (30 May 2020 01:02):    No growth to date.  Culture - Blood (collected 29 May 2020 00:35)  Source: .Blood Blood-Peripheral  Preliminary Report (30 May 2020 01:02):    No growth to date.  RADIOLOGY & ADDITIONAL TESTS:< from: CT Head No Cont (05.29.20 @ 21:27) >  EXAM:  CT BRAIN                        PROCEDURE DATE:  05/29/2020    INTERPRETATION:  NONCONTRAST CT OF THE BRAIN DATED 5/29/2020.  CLINICAL INFORMATION:  Possible seizure  TECHNIQUE: Axial CT images are obtained from the cranial vertex to the skull base without the administration of IV contrast. Images are reformatted in sagittal and coronal planes.  The study is correlated with a prior exam from 5/8/2020.  FINDINGS:  Evaluation is degraded by motion. There is no gross acute intra-axial or extra-axial hemorrhage. There is no significant mass effect or shift of the midline. There is marked cerebral volume loss with prominence of the ventricles and sulci. Chronic ischemic changes are seen in the frontoparietal white matter. There are atherosclerotic calcifications of the intracranial carotid arteries.  The regional soft tissues and osseous structures are unremarkable. The visualized paranasal sinuses and tympanic/mastoid cavities are clear apart from aerosolized secretions in the left sphenoid sinus, small mucous retention cyst versus polyp in the right sphenoid sinus, and a right mastoid effusion.    IMPRESSION:    Motion degraded exam shows no gross acute intracranial hemorrhage or mass effect. No significantinterval change compared to the prior exam from 5/8/2020.    < end of copied text >     reviewed elctronically   ASSESSMENT/PLAN:

## 2020-06-02 NOTE — PROGRESS NOTE ADULT - SUBJECTIVE AND OBJECTIVE BOX
Patient is a 74y old  Female who presents with a chief complaint of RESPIRATORY DISTRESS (02 Jun 2020 07:14)    24 hour events: No acute events overnight.     REVIEW OF SYSTEMS  Unable to obtain meaningful ROS secondary to mental status.    T(F): 100.1 (06-02-20 @ 07:28), Max: 100.6 (06-01-20 @ 12:00)  HR: 72 (06-02-20 @ 07:43) (70 - 116)  BP: 143/65 (06-02-20 @ 07:00) (119/69 - 179/75)  RR: 15 (06-02-20 @ 07:00) (14 - 39)  SpO2: 93% (06-02-20 @ 07:43) (93% - 100%)  Wt(kg): --    Mode: AC/ CMV (Assist Control/ Continuous Mandatory Ventilation), RR (machine): 14, TV (machine): 420, FiO2: 30, PEEP: 5        I&O's Summary    06-01 @ 07:01  -  06-02 @ 07:00  --------------------------------------------------------  IN: 1690 mL / OUT: 1150 mL / NET: 540 mL      PHYSICAL EXAM  General:   CNS:   HEENT:   Resp:   CVS:   Abd:   Ext:   Skin:     MEDICATIONS      dextrose 40% Gel Oral PRN  dextrose 50% Injectable IV Push  dextrose 50% Injectable IV Push  dextrose 50% Injectable IV Push  glucagon  Injectable IntraMuscular PRN  insulin glargine Injectable (LANTUS) SubCutaneous  insulin lispro (HumaLOG) corrective regimen sliding scale SubCutaneous      fentaNYL   Patch  12 MICROgram(s)/Hr Transdermal      enoxaparin Injectable SubCutaneous    pantoprazole  Injectable IV Push  polyethylene glycol 3350 Oral  saline laxative (FLEET) Rectal Enema Rectal  saline laxative (FLEET) Rectal Enema Rectal PRN      dextrose 5%. IV Continuous      chlorhexidine 0.12% Liquid Oral Mucosa  chlorhexidine 2% Cloths Topical                            9.5    6.33  )-----------( 269      ( 02 Jun 2020 05:50 )             32.0       06-02    151<H>  |  117<H>  |  27<H>  ----------------------------<  252<H>  4.2   |  26  |  0.82    Ca    8.8      02 Jun 2020 05:50  Phos  2.5     06-02  Mg     2.5     06-02    TPro  7.0  /  Alb  2.6<L>  /  TBili  0.3  /  DBili  x   /  AST  14<L>  /  ALT  18  /  AlkPhos  88  06-02              .Sputum Sputum   Few Pseudomonas aeruginosa  Normal Respiratory Elvira present   Few polymorphonuclear leukocytes per low power field  Few Squamous epithelial cells per low power field  Few Gram Negative Rods per oil power field 05-29 @ 21:16  .Urine Catheterized   <10,000 CFU/mL Normal Urogenital Elvira -- 05-29 @ 01:06  .Blood Blood-Peripheral   No growth to date. -- 05-29 @ 00:35        CHRONIC REID      GLOBAL ISSUE/BEST PRACTICE:  Analgesia: N  Sedation: N  CAM-ICU: YULIYA  HOB elevation: Y  Stress ulcer prophylaxis: Y  VTE prophylaxis: Y  Glycemic control: Y  Nutrition: Y    CODE STATUS: FULL Patient is a 74y old  Female who presents with a chief complaint of RESPIRATORY DISTRESS (02 Jun 2020 07:14)    24 hour events: No acute events overnight.     REVIEW OF SYSTEMS  Unable to obtain meaningful ROS secondary to mental status.    T(F): 100.1 (06-02-20 @ 07:28), Max: 100.6 (06-01-20 @ 12:00)  HR: 72 (06-02-20 @ 07:43) (70 - 116)  BP: 143/65 (06-02-20 @ 07:00) (119/69 - 179/75)  RR: 15 (06-02-20 @ 07:00) (14 - 39)  SpO2: 93% (06-02-20 @ 07:43) (93% - 100%)    Mode: AC/ CMV (Assist Control/ Continuous Mandatory Ventilation), RR (machine): 14, TV (machine): 420, FiO2: 30, PEEP: 5        I&O's Summary    06-01 @ 07:01  -  06-02 @ 07:00  --------------------------------------------------------  IN: 1690 mL / OUT: 1150 mL / NET: 540 mL      PHYSICAL EXAM  Gen: Chronically critically ill female, trach to vent  Neuro: Minimally arousable by tactile stimulation, will open eyes. Does not respond to commands or demonstrate purposeful movements.   HEENT: PERRL, symmetric  Resp: CTA b/l through anterior examination  CVS: RRR, +S1 and S2  Abd: soft, NTND  Ext: UE contracted in flexion at the shoulders, elbows, and hands b/l  Skin: WWP    MEDICATIONS      dextrose 40% Gel Oral PRN  dextrose 50% Injectable IV Push  dextrose 50% Injectable IV Push  dextrose 50% Injectable IV Push  glucagon  Injectable IntraMuscular PRN  insulin glargine Injectable (LANTUS) SubCutaneous  insulin lispro (HumaLOG) corrective regimen sliding scale SubCutaneous      fentaNYL   Patch  12 MICROgram(s)/Hr Transdermal      enoxaparin Injectable SubCutaneous    pantoprazole  Injectable IV Push  polyethylene glycol 3350 Oral  saline laxative (FLEET) Rectal Enema Rectal  saline laxative (FLEET) Rectal Enema Rectal PRN      dextrose 5%. IV Continuous      chlorhexidine 0.12% Liquid Oral Mucosa  chlorhexidine 2% Cloths Topical                            9.5    6.33  )-----------( 269      ( 02 Jun 2020 05:50 )             32.0       06-02    151<H>  |  117<H>  |  27<H>  ----------------------------<  252<H>  4.2   |  26  |  0.82    Ca    8.8      02 Jun 2020 05:50  Phos  2.5     06-02  Mg     2.5     06-02    TPro  7.0  /  Alb  2.6<L>  /  TBili  0.3  /  DBili  x   /  AST  14<L>  /  ALT  18  /  AlkPhos  88  06-02              .Sputum Sputum   Few Pseudomonas aeruginosa  Normal Respiratory Elvira present   Few polymorphonuclear leukocytes per low power field  Few Squamous epithelial cells per low power field  Few Gram Negative Rods per oil power field 05-29 @ 21:16  .Urine Catheterized   <10,000 CFU/mL Normal Urogenital Elvira -- 05-29 @ 01:06  .Blood Blood-Peripheral   No growth to date. -- 05-29 @ 00:35        CHRONIC REID      GLOBAL ISSUE/BEST PRACTICE:  Analgesia: N  Sedation: N  CAM-ICU: YULIYA  HOB elevation: Y  Stress ulcer prophylaxis: Y  VTE prophylaxis: Y  Glycemic control: Y  Nutrition: Y    CODE STATUS: FULL

## 2020-06-02 NOTE — PROGRESS NOTE ADULT - ASSESSMENT
73 y/o woman with PMHx of frontotemporal dementia, chronic vent dependent respiratory failure, bedbound at baseline who presents with respiratory distress, diaphoresis, and generalized spasm of extremities.  She was found to be febrile, hypertensive, tachycardic, and tachypneic on admission with severe lactic acidosis.  Concern for seizure activity with elevated lactate, initially downtrended with uptrend due to possible repeat seizure activity. Afebrile since yesterday afternoon, but otherwise resolved signs of infection and tolerating chronic vent settings.     Neuro: Concern for seizure activity on admission in light of severe lactic acidosis and elevated prolactin, no evidence currently. Extensive neurological w/u in past, including 3-day EEG, without evidence of seizure activity. No need for antiepileptic medications as per neuro. Abnormal stiffening likely 2/2 neurodegenerative process.   CV: Hemodynamically stable.   Pulm: Chronic vent-dependent respiratory failure, continue full support to keep SpO2 >90%, settings: AC/14/420/30/5 currently.  Renal: Normal renal function. Monitor and supplement lytes PRN. Hypernatremic to 151 this morning, free water 300cc Q6H and f/u repeat BMP at 1500.  GI: Continue TFs 2/2 dysphagia. Continue bowel regimen for chronic constipation with miralax, fleet enema's MWF, and PRN fleet enemas at request of patient's family. Protonix for GI ppx.  ID: Trach aspirate cx from 5/29 with few pseudomonas, likely colonizer rather than true infection. UCx and BCx negative, MRSA negative, and COVID negative. COVID Ab positive. Continue to monitor off antibiotics, WBC stable and afebrile overnight.  Endo: FSGs suboptimal, on moderate dose SS Q6H, 7U lantus QHS added yeseterday, ----------.  : Chronic vaughn catheter in place, continue for urinary retention.  Heme: Lovenox 40mg SQ daily for DVT ppx.  Dispo: Stable for d/c back to SNF.    . 75 y/o woman with PMHx of frontotemporal dementia, chronic vent dependent respiratory failure, bedbound at baseline who presents with respiratory distress, diaphoresis, and generalized spasm of extremities.  She was found to be febrile, hypertensive, tachycardic, and tachypneic on admission with severe lactic acidosis.  Concern for seizure activity with elevated lactate, initially downtrended with uptrend due to possible repeat seizure activity. Afebrile since yesterday afternoon, but otherwise resolved signs of infection and tolerating chronic vent settings.     Neuro: Concern for seizure activity on admission in light of severe lactic acidosis and elevated prolactin, no evidence currently. Extensive neurological w/u in past, including 3-day EEG, without evidence of seizure activity. No need for antiepileptic medications as per neuro. Abnormal stiffening likely 2/2 neurodegenerative process.   CV: Hemodynamically stable.   Pulm: Chronic vent-dependent respiratory failure, continue full support to keep SpO2 >90%, settings: AC/14/420/30/5 currently.  Renal: Normal renal function. Monitor and supplement lytes PRN. Hypernatremic to 151 this morning, free water 300cc Q6H and f/u repeat BMP at 1500.  GI: Continue TFs 2/2 dysphagia. Continue bowel regimen for chronic constipation with miralax, fleet enema's MWF, and PRN fleet enemas at request of patient's family. Protonix for GI ppx.  ID: Trach aspirate cx from 5/29 with few pseudomonas, likely colonizer rather than true infection. UCx and BCx negative, MRSA negative, and COVID negative. COVID Ab positive. Continue to monitor off antibiotics, WBC stable and afebrile overnight.  Endo: FSGs suboptimal, on moderate dose SS Q6H, 7U lantus QHS added yesterday will increase to 12U QHS tonight.  : Chronic vaughn catheter in place, continue for urinary retention.  Heme: Lovenox 40mg SQ daily for DVT ppx.  Dispo: Stable for d/c back to SNF. COVID PCR negative yesterday 6/1, repeat COVID PCR today for 2 negative 24 hours apart. COVID Ab positive.     .

## 2020-06-02 NOTE — PROGRESS NOTE ADULT - NSHPATTENDINGPLANDISCUSS_GEN_ALL_CORE
, ,   , , LTC FACILITY PCP .DISCHARGE TO LTC FACILITY WHEN ARRANGED BY SOCIAL SERVICE AND IF OK WITH ICU TEAM .

## 2020-06-03 ENCOUNTER — TRANSCRIPTION ENCOUNTER (OUTPATIENT)
Age: 75
End: 2020-06-03

## 2020-06-03 LAB
ALBUMIN SERPL ELPH-MCNC: 2.6 G/DL — LOW (ref 3.3–5)
ALP SERPL-CCNC: 84 U/L — SIGNIFICANT CHANGE UP (ref 40–120)
ALT FLD-CCNC: 18 U/L — SIGNIFICANT CHANGE UP (ref 12–78)
ANION GAP SERPL CALC-SCNC: 7 MMOL/L — SIGNIFICANT CHANGE UP (ref 5–17)
AST SERPL-CCNC: 13 U/L — LOW (ref 15–37)
BASOPHILS # BLD AUTO: 0.06 K/UL — SIGNIFICANT CHANGE UP (ref 0–0.2)
BASOPHILS NFR BLD AUTO: 0.8 % — SIGNIFICANT CHANGE UP (ref 0–2)
BILIRUB SERPL-MCNC: 0.4 MG/DL — SIGNIFICANT CHANGE UP (ref 0.2–1.2)
BUN SERPL-MCNC: 29 MG/DL — HIGH (ref 7–23)
CALCIUM SERPL-MCNC: 9 MG/DL — SIGNIFICANT CHANGE UP (ref 8.5–10.1)
CHLORIDE SERPL-SCNC: 112 MMOL/L — HIGH (ref 96–108)
CO2 SERPL-SCNC: 26 MMOL/L — SIGNIFICANT CHANGE UP (ref 22–31)
CREAT SERPL-MCNC: 0.75 MG/DL — SIGNIFICANT CHANGE UP (ref 0.5–1.3)
CULTURE RESULTS: SIGNIFICANT CHANGE UP
CULTURE RESULTS: SIGNIFICANT CHANGE UP
EOSINOPHIL # BLD AUTO: 0.25 K/UL — SIGNIFICANT CHANGE UP (ref 0–0.5)
EOSINOPHIL NFR BLD AUTO: 3.3 % — SIGNIFICANT CHANGE UP (ref 0–6)
GLUCOSE SERPL-MCNC: 206 MG/DL — HIGH (ref 70–99)
HCT VFR BLD CALC: 33.7 % — LOW (ref 34.5–45)
HGB BLD-MCNC: 10 G/DL — LOW (ref 11.5–15.5)
IMM GRANULOCYTES NFR BLD AUTO: 2.1 % — HIGH (ref 0–1.5)
LYMPHOCYTES # BLD AUTO: 1.42 K/UL — SIGNIFICANT CHANGE UP (ref 1–3.3)
LYMPHOCYTES # BLD AUTO: 18.9 % — SIGNIFICANT CHANGE UP (ref 13–44)
MAGNESIUM SERPL-MCNC: 2.6 MG/DL — SIGNIFICANT CHANGE UP (ref 1.6–2.6)
MCHC RBC-ENTMCNC: 26.9 PG — LOW (ref 27–34)
MCHC RBC-ENTMCNC: 29.7 GM/DL — LOW (ref 32–36)
MCV RBC AUTO: 90.6 FL — SIGNIFICANT CHANGE UP (ref 80–100)
MONOCYTES # BLD AUTO: 0.71 K/UL — SIGNIFICANT CHANGE UP (ref 0–0.9)
MONOCYTES NFR BLD AUTO: 9.4 % — SIGNIFICANT CHANGE UP (ref 2–14)
NEUTROPHILS # BLD AUTO: 4.92 K/UL — SIGNIFICANT CHANGE UP (ref 1.8–7.4)
NEUTROPHILS NFR BLD AUTO: 65.5 % — SIGNIFICANT CHANGE UP (ref 43–77)
NRBC # BLD: 0 /100 WBCS — SIGNIFICANT CHANGE UP (ref 0–0)
PHOSPHATE SERPL-MCNC: 2.6 MG/DL — SIGNIFICANT CHANGE UP (ref 2.5–4.5)
PLATELET # BLD AUTO: 283 K/UL — SIGNIFICANT CHANGE UP (ref 150–400)
POTASSIUM SERPL-MCNC: 4.1 MMOL/L — SIGNIFICANT CHANGE UP (ref 3.5–5.3)
POTASSIUM SERPL-SCNC: 4.1 MMOL/L — SIGNIFICANT CHANGE UP (ref 3.5–5.3)
PROT SERPL-MCNC: 6.8 G/DL — SIGNIFICANT CHANGE UP (ref 6–8.3)
RBC # BLD: 3.72 M/UL — LOW (ref 3.8–5.2)
RBC # FLD: 16 % — HIGH (ref 10.3–14.5)
SODIUM SERPL-SCNC: 145 MMOL/L — SIGNIFICANT CHANGE UP (ref 135–145)
SPECIMEN SOURCE: SIGNIFICANT CHANGE UP
SPECIMEN SOURCE: SIGNIFICANT CHANGE UP
WBC # BLD: 7.52 K/UL — SIGNIFICANT CHANGE UP (ref 3.8–10.5)
WBC # FLD AUTO: 7.52 K/UL — SIGNIFICANT CHANGE UP (ref 3.8–10.5)

## 2020-06-03 PROCEDURE — 99232 SBSQ HOSP IP/OBS MODERATE 35: CPT

## 2020-06-03 RX ORDER — INSULIN GLARGINE 100 [IU]/ML
20 INJECTION, SOLUTION SUBCUTANEOUS AT BEDTIME
Refills: 0 | Status: DISCONTINUED | OUTPATIENT
Start: 2020-06-03 | End: 2020-06-05

## 2020-06-03 RX ADMIN — CHLORHEXIDINE GLUCONATE 15 MILLILITER(S): 213 SOLUTION TOPICAL at 06:01

## 2020-06-03 RX ADMIN — ENOXAPARIN SODIUM 40 MILLIGRAM(S): 100 INJECTION SUBCUTANEOUS at 12:06

## 2020-06-03 RX ADMIN — PANTOPRAZOLE SODIUM 40 MILLIGRAM(S): 20 TABLET, DELAYED RELEASE ORAL at 12:06

## 2020-06-03 RX ADMIN — FENTANYL CITRATE 1 PATCH: 50 INJECTION INTRAVENOUS at 07:54

## 2020-06-03 RX ADMIN — Medication 4: at 23:01

## 2020-06-03 RX ADMIN — Medication 2: at 18:50

## 2020-06-03 RX ADMIN — POLYETHYLENE GLYCOL 3350 17 GRAM(S): 17 POWDER, FOR SOLUTION ORAL at 12:06

## 2020-06-03 RX ADMIN — Medication 6: at 12:19

## 2020-06-03 RX ADMIN — FENTANYL CITRATE 1 PATCH: 50 INJECTION INTRAVENOUS at 19:20

## 2020-06-03 RX ADMIN — INSULIN GLARGINE 20 UNIT(S): 100 INJECTION, SOLUTION SUBCUTANEOUS at 22:53

## 2020-06-03 RX ADMIN — CHLORHEXIDINE GLUCONATE 1 APPLICATION(S): 213 SOLUTION TOPICAL at 12:07

## 2020-06-03 RX ADMIN — CHLORHEXIDINE GLUCONATE 15 MILLILITER(S): 213 SOLUTION TOPICAL at 18:49

## 2020-06-03 RX ADMIN — Medication 4: at 06:01

## 2020-06-03 NOTE — PROGRESS NOTE ADULT - SUBJECTIVE AND OBJECTIVE BOX
Neurology follow up note    BREA [id number]      Interval History:    Patient resting in bed    MEDICATIONS    chlorhexidine 0.12% Liquid 15 milliLiter(s) Oral Mucosa every 12 hours  chlorhexidine 2% Cloths 1 Application(s) Topical daily  dextrose 40% Gel 15 Gram(s) Oral once PRN  dextrose 5%. 1000 milliLiter(s) IV Continuous <Continuous>  dextrose 50% Injectable 12.5 Gram(s) IV Push once  dextrose 50% Injectable 25 Gram(s) IV Push once  dextrose 50% Injectable 25 Gram(s) IV Push once  enoxaparin Injectable 40 milliGRAM(s) SubCutaneous daily  fentaNYL   Patch  12 MICROgram(s)/Hr 1 Patch Transdermal every 72 hours  glucagon  Injectable 1 milliGRAM(s) IntraMuscular once PRN  insulin glargine Injectable (LANTUS) 20 Unit(s) SubCutaneous at bedtime  insulin lispro (HumaLOG) corrective regimen sliding scale   SubCutaneous every 6 hours  pantoprazole  Injectable 40 milliGRAM(s) IV Push daily  polyethylene glycol 3350 17 Gram(s) Oral daily  saline laxative (FLEET) Rectal Enema 1 Enema Rectal <User Schedule>  saline laxative (FLEET) Rectal Enema 1 Enema Rectal daily PRN      Allergies    codeine (Hives)    Intolerances            Vital Signs Last 24 Hrs  T(C): 37.4 (03 Jun 2020 07:54), Max: 37.9 (02 Jun 2020 16:33)  T(F): 99.4 (03 Jun 2020 07:54), Max: 100.2 (02 Jun 2020 16:33)  HR: 99 (03 Jun 2020 07:55) (73 - 99)  BP: 169/76 (03 Jun 2020 06:00) (132/63 - 169/76)  BP(mean): 109 (03 Jun 2020 06:00) (88 - 109)  RR: 15 (03 Jun 2020 06:00) (14 - 20)  SpO2: 99% (03 Jun 2020 07:55) (95% - 100%)      REVIEW OF SYSTEMS:  Could not be obtained secondary to the patient being nonverbal.  As per my conversation with the spouse, a gradual process along with underlying strokes causing her to become bed-bound.    PHYSICAL EXAMINATION:    Head:  Normocephalic, atraumatic.  Eyes:  No scleral icterus.  Ears:  Cannot be evaluated secondary to the patient being nonverbal.  NECK:  Tracheostomy was in place.  RESPIRATORY:  Good air entry bilaterally.  CARDIOVASCULAR:  S1 and S2 heard.  ABDOMEN:  Soft and nontender.  EXTREMITIES:  No clubbing or cyanosis were noted.      NEUROLOGIC:  The patient was awake in bed.  Upon stimulating the patient, her eyes did roll up.  Her body started to stiffen with abnormal mouth movements, lasted one to two minutes.  Pupils bilaterally were 3 mm, reactive to 2 mm.  The patient has her arms in a flexed position with both hands contracted.  Bilateral lower extremities were in a straight position.  The patient has increased tone, bilateral upper and lower extremities.            LABS:  CBC Full  -  ( 03 Jun 2020 06:25 )  WBC Count : 7.52 K/uL  RBC Count : 3.72 M/uL  Hemoglobin : 10.0 g/dL  Hematocrit : 33.7 %  Platelet Count - Automated : 283 K/uL  Mean Cell Volume : 90.6 fl  Mean Cell Hemoglobin : 26.9 pg  Mean Cell Hemoglobin Concentration : 29.7 gm/dL  Auto Neutrophil # : 4.92 K/uL  Auto Lymphocyte # : 1.42 K/uL  Auto Monocyte # : 0.71 K/uL  Auto Eosinophil # : 0.25 K/uL  Auto Basophil # : 0.06 K/uL  Auto Neutrophil % : 65.5 %  Auto Lymphocyte % : 18.9 %  Auto Monocyte % : 9.4 %  Auto Eosinophil % : 3.3 %  Auto Basophil % : 0.8 %      06-03    145  |  112<H>  |  29<H>  ----------------------------<  206<H>  4.1   |  26  |  0.75    Ca    9.0      03 Jun 2020 06:25  Phos  2.6     06-03  Mg     2.6     06-03    TPro  6.8  /  Alb  2.6<L>  /  TBili  0.4  /  DBili  x   /  AST  13<L>  /  ALT  18  /  AlkPhos  84  06-03    Hemoglobin A1C:     LIVER FUNCTIONS - ( 03 Jun 2020 06:25 )  Alb: 2.6 g/dL / Pro: 6.8 g/dL / ALK PHOS: 84 U/L / ALT: 18 U/L / AST: 13 U/L / GGT: x           Vitamin B12         RADIOLOGY      ANALYSIS AND PLAN:  A 74-year-old with abnormal movements.  1.	For abnormal stiffening, suspect most likely secondary to a history of underlying neurodegenerative type process with degeneration of the patient's brain parenchyma, causing her to have these abnormal movements.  As per my conversation with the spouse, this has been going on for over five years.  She has had over 200 of these events, and as per my conversation with him, he did have EEG prolonged monitoring which recorded these events, they were not seizure activities.  2.	No need for any antiepileptic medications.  3.	For respiratory failure, monitor respiratory status.  4.	Monitor the patient's systolic blood pressure.  5.	Monitor electrolytes.  6.	Spoke with the spouse in great detail, Marciano.  7.	From neurology standpoint only, the patient is stable.  8.	Advanced care directives were discussed with the spouse.  9.	spoke to spouse yesterday about possible medications to add medications for these movement he is refusing     Greater than 34 minutes spent in direct patient care reviewing  the notes, lab data/ imaging , discussion with multidisciplinary team.

## 2020-06-03 NOTE — PROGRESS NOTE ADULT - SUBJECTIVE AND OBJECTIVE BOX
Date/Time Patient Seen:  		  Referring MD:   Data Reviewed	       Patient is a 74y old  Female who presents with a chief complaint of RESPIRATORY DISTRESS (03 Jun 2020 07:18)      Subjective/HPI     PAST MEDICAL & SURGICAL HISTORY:  Pneumonia  Quadriplegia  COVID-19 virus detected  Advanced dementia  DM (diabetes mellitus)  HTN (hypertension)  CVA (cerebral vascular accident)  Respiratory failure  Constipation  GERD (gastroesophageal reflux disease)  Hypertension  Respiratory failure  Diabetes mellitus  Aphasic stroke  Dementia of frontal lobe type  Feeding by G-tube  Tracheostomy tube present  Tracheostomy in place  Gastrostomy in place  Hx of appendectomy        Medication list         MEDICATIONS  (STANDING):  chlorhexidine 0.12% Liquid 15 milliLiter(s) Oral Mucosa every 12 hours  chlorhexidine 2% Cloths 1 Application(s) Topical daily  dextrose 5%. 1000 milliLiter(s) (50 mL/Hr) IV Continuous <Continuous>  dextrose 50% Injectable 12.5 Gram(s) IV Push once  dextrose 50% Injectable 25 Gram(s) IV Push once  dextrose 50% Injectable 25 Gram(s) IV Push once  enoxaparin Injectable 40 milliGRAM(s) SubCutaneous daily  fentaNYL   Patch  12 MICROgram(s)/Hr 1 Patch Transdermal every 72 hours  insulin glargine Injectable (LANTUS) 12 Unit(s) SubCutaneous at bedtime  insulin lispro (HumaLOG) corrective regimen sliding scale   SubCutaneous every 6 hours  pantoprazole  Injectable 40 milliGRAM(s) IV Push daily  polyethylene glycol 3350 17 Gram(s) Oral daily  saline laxative (FLEET) Rectal Enema 1 Enema Rectal <User Schedule>    MEDICATIONS  (PRN):  dextrose 40% Gel 15 Gram(s) Oral once PRN Blood Glucose LESS THAN 70 milliGRAM(s)/deciliter  glucagon  Injectable 1 milliGRAM(s) IntraMuscular once PRN Glucose LESS THAN 70 milligrams/deciliter  saline laxative (FLEET) Rectal Enema 1 Enema Rectal daily PRN constipation, to assist in bowel movement         Vitals log        ICU Vital Signs Last 24 Hrs  T(C): 37.3 (03 Jun 2020 03:19), Max: 37.9 (02 Jun 2020 16:33)  T(F): 99.1 (03 Jun 2020 03:19), Max: 100.2 (02 Jun 2020 16:33)  HR: 80 (03 Jun 2020 06:00) (73 - 111)  BP: 169/76 (03 Jun 2020 06:00) (132/63 - 187/85)  BP(mean): 109 (03 Jun 2020 06:00) (88 - 122)  ABP: --  ABP(mean): --  RR: 15 (03 Jun 2020 06:00) (14 - 35)  SpO2: 100% (03 Jun 2020 06:00) (93% - 100%)       Mode: AC/ CMV (Assist Control/ Continuous Mandatory Ventilation)  RR (machine): 14  TV (machine): 420  FiO2: 30  PEEP: 5  ITime: 1  MAP: 12  PIP: 30      Input and Output:  I&O's Detail    02 Jun 2020 07:01  -  03 Jun 2020 07:00  --------------------------------------------------------  IN:    Enteral Tube Flush: 1200 mL    Glucerna 1.5: 1200 mL  Total IN: 2400 mL    OUT:    Indwelling Catheter - Urethral: 1050 mL  Total OUT: 1050 mL    Total NET: 1350 mL          Lab Data                        10.0   7.52  )-----------( 283      ( 03 Jun 2020 06:25 )             33.7     06-03    145  |  112<H>  |  29<H>  ----------------------------<  206<H>  4.1   |  26  |  0.75    Ca    9.0      03 Jun 2020 06:25  Phos  2.6     06-03  Mg     2.6     06-03    TPro  6.8  /  Alb  2.6<L>  /  TBili  0.4  /  DBili  x   /  AST  13<L>  /  ALT  18  /  AlkPhos  84  06-03            Review of Systems	      Objective     Physical Examination    heart s1s2  lung dec BS      Pertinent Lab findings & Imaging      Annalise:  NO   Adequate UO     I&O's Detail    02 Jun 2020 07:01  -  03 Jun 2020 07:00  --------------------------------------------------------  IN:    Enteral Tube Flush: 1200 mL    Glucerna 1.5: 1200 mL  Total IN: 2400 mL    OUT:    Indwelling Catheter - Urethral: 1050 mL  Total OUT: 1050 mL    Total NET: 1350 mL               Discussed with:     Cultures:	        Radiology

## 2020-06-03 NOTE — PROGRESS NOTE ADULT - SUBJECTIVE AND OBJECTIVE BOX
Patient is a 74y old  Female who presents with a chief complaint of RESPIRATORY DISTRESS (02 Jun 2020 14:17)    24 hour events: No acute events overnight.    REVIEW OF SYSTEMS  Unable to gain meaningful ROS secondary to clinical status.    T(F): 99.1 (06-03-20 @ 03:19), Max: 100.2 (06-02-20 @ 16:33)  HR: 80 (06-03-20 @ 06:00) (72 - 111)  BP: 169/76 (06-03-20 @ 06:00) (132/63 - 187/85)  RR: 15 (06-03-20 @ 06:00) (14 - 35)  SpO2: 100% (06-03-20 @ 06:00) (93% - 100%)    Mode: AC/ CMV (Assist Control/ Continuous Mandatory Ventilation), RR (machine): 14, TV (machine): 420, FiO2: 30, PEEP: 5        I&O's Summary    06-02 @ 07:01  -  06-03 @ 07:00  --------------------------------------------------------  IN: 2400 mL / OUT: 1050 mL / NET: 1350 mL      PHYSICAL EXAM  Gen: Chronically critically ill female, trach to vent  Neuro: Minimally arousable by tactile stimulation, will open eyes. Does not respond to commands or demonstrate purposeful movements.   HEENT: PERRL, symmetric  Resp: CTA b/l through anterior examination  CVS: RRR, +S1 and S2  Abd: soft, NTND  Ext: UE contracted in flexion at the shoulders, elbows, and hands b/l  Skin: WWP    MEDICATIONS      dextrose 40% Gel Oral PRN  dextrose 50% Injectable IV Push  dextrose 50% Injectable IV Push  dextrose 50% Injectable IV Push  glucagon  Injectable IntraMuscular PRN  insulin glargine Injectable (LANTUS) SubCutaneous  insulin lispro (HumaLOG) corrective regimen sliding scale SubCutaneous      fentaNYL   Patch  12 MICROgram(s)/Hr Transdermal      enoxaparin Injectable SubCutaneous    pantoprazole  Injectable IV Push  polyethylene glycol 3350 Oral  saline laxative (FLEET) Rectal Enema Rectal  saline laxative (FLEET) Rectal Enema Rectal PRN      dextrose 5%. IV Continuous      chlorhexidine 0.12% Liquid Oral Mucosa  chlorhexidine 2% Cloths Topical                            10.0   7.52  )-----------( 283      ( 03 Jun 2020 06:25 )             33.7       06-03    145  |  112<H>  |  29<H>  ----------------------------<  206<H>  4.1   |  26  |  0.75    Ca    9.0      03 Jun 2020 06:25  Phos  2.5     06-02  Mg     2.6     06-03    TPro  6.8  /  Alb  2.6<L>  /  TBili  0.4  /  DBili  x   /  AST  13<L>  /  ALT  18  /  AlkPhos  84  06-03              .Sputum Sputum   Few Pseudomonas aeruginosa  Normal Respiratory Elvira present   Few polymorphonuclear leukocytes per low power field  Few Squamous epithelial cells per low power field  Few Gram Negative Rods per oil power field 05-29 @ 21:16        CHRONIC REID      GLOBAL ISSUE/BEST PRACTICE:  Analgesia: N  Sedation: N  CAM-ICU: YULIYA  HOB elevation: Y  Stress ulcer prophylaxis: Y  VTE prophylaxis: Y  Glycemic control: Y  Nutrition: Y    CODE STATUS: FULL Patient is a 74y old  Female who presents with a chief complaint of RESPIRATORY DISTRESS (02 Jun 2020 14:17)    24 hour events: No acute events overnight.    REVIEW OF SYSTEMS  Unable to gain meaningful ROS secondary to clinical status.    T(F): 99.1 (06-03-20 @ 03:19), Max: 100.2 (06-02-20 @ 16:33)  HR: 80 (06-03-20 @ 06:00) (72 - 111)  BP: 169/76 (06-03-20 @ 06:00) (132/63 - 187/85)  RR: 15 (06-03-20 @ 06:00) (14 - 35)  SpO2: 100% (06-03-20 @ 06:00) (93% - 100%)    Mode: AC/ CMV (Assist Control/ Continuous Mandatory Ventilation), RR (machine): 14, TV (machine): 420, FiO2: 30, PEEP: 5        I&O's Summary    06-02 @ 07:01  -  06-03 @ 07:00  --------------------------------------------------------  IN: 2400 mL / OUT: 1050 mL / NET: 1350 mL      PHYSICAL EXAM  Gen: Chronically critically ill female, trach to vent  Neuro: Awake and alert. Does not respond to commands or demonstrate purposeful movements.   HEENT: PERRL, symmetric  Resp: Coarse breath sounds throughout lung fields anteriorly  CVS: RRR, +S1 and S2  Abd: soft, NTND  Ext: UE contracted in flexion at the shoulders, elbows, and hands b/l; b/l contracted distal LEs  Skin: WWP    MEDICATIONS      dextrose 40% Gel Oral PRN  dextrose 50% Injectable IV Push  dextrose 50% Injectable IV Push  dextrose 50% Injectable IV Push  glucagon  Injectable IntraMuscular PRN  insulin glargine Injectable (LANTUS) SubCutaneous  insulin lispro (HumaLOG) corrective regimen sliding scale SubCutaneous      fentaNYL   Patch  12 MICROgram(s)/Hr Transdermal      enoxaparin Injectable SubCutaneous    pantoprazole  Injectable IV Push  polyethylene glycol 3350 Oral  saline laxative (FLEET) Rectal Enema Rectal  saline laxative (FLEET) Rectal Enema Rectal PRN      dextrose 5%. IV Continuous      chlorhexidine 0.12% Liquid Oral Mucosa  chlorhexidine 2% Cloths Topical                            10.0   7.52  )-----------( 283      ( 03 Jun 2020 06:25 )             33.7       06-03    145  |  112<H>  |  29<H>  ----------------------------<  206<H>  4.1   |  26  |  0.75    Ca    9.0      03 Jun 2020 06:25  Phos  2.5     06-02  Mg     2.6     06-03    TPro  6.8  /  Alb  2.6<L>  /  TBili  0.4  /  DBili  x   /  AST  13<L>  /  ALT  18  /  AlkPhos  84  06-03              .Sputum Sputum   Few Pseudomonas aeruginosa  Normal Respiratory Elvira present   Few polymorphonuclear leukocytes per low power field  Few Squamous epithelial cells per low power field  Few Gram Negative Rods per oil power field 05-29 @ 21:16        CHRONIC REID      GLOBAL ISSUE/BEST PRACTICE:  Analgesia: N  Sedation: N  CAM-ICU: YULIYA  HOB elevation: Y  Stress ulcer prophylaxis: Y  VTE prophylaxis: Y  Glycemic control: Y  Nutrition: Y    CODE STATUS: FULL

## 2020-06-03 NOTE — PROGRESS NOTE ADULT - SUBJECTIVE AND OBJECTIVE BOX
PROGRESS NOTE  Patient is a 74y old  Female who presents with a chief complaint of RESPIRATORY DISTRESS (03 Jun 2020 07:45)      OVERNIGHT      HPI:  74 Female transferred from  [facility] to [place] ED with report of sepsis, pneumonia, RYAN, patient on chronic trach collar , recent admission to [facility]  5/4/20 to 5/18/20 for hypoxia, pneumonia, COVID positive 04/27/20 ,developed hypoxia and was placed on respiratory support ,required ICU admission with shock and was placed on pressors  .Treated for UTI and pseudomonal pneumonia ,s/p cefepime ,had elevation of D-dimers .CTT was negative for PE , Doppler neg for DVT .Seen by neurologist for sz -like activity ,neurologist impression was that it was related to hypoxia CT Brain was negative for CVA .COVID  tested negative on 05/11 and 05/12  Patient had been sent to ER for respiratory distress ,associated with hypotension tachycardia ,tachypnea and diaphoresis  .Patient has a hx significant for Advanced dementia  ,Aphasic stroke  ,Constipation  ,COVID-19 virus detected  ,COPD ,Anxiety ,Dysphagia ,hyperkalemia ,pneumonia ,pulmonary edema , CVA (cerebral vascular accident)  ,Dementia of frontal lobe type  ,Diabetes mellitus  ,DM (diabetes mellitus)  ,GERD (gastroesophageal reflux disease)  ,HTN (hypertension)  ,Hypertension  ,Pneumonia  ,Quadriplegia  ,Respiratory failure  ,Feeding by G-tube  ,Gastrostomy in place  ,Hx of appendectomy  ,Tracheostomy in place  ,Tracheostomy tube present, 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 Admitted to ICU ,seen by pulm cons Dr Sandoval on admission to [place] ED ( transferred from [facility])and was admitted to ICU  for septic workup and evaluation ,send blood and urine cx, serial lactate levels ,monitor vitals closely hydration ,monitor urine output and renal profile ,iv abx initiated Palliative care consult requested ,to discuss advance directives and complete MOLST .Patient was diagnosed with sepsis 2/2 to pneumonia and  called ER respiratory and  ICU ,patient  already received Cefepime 2gm iv, vancomycin  1gm iv, rectal Tylenol 650mg, LR x 1 liter, NS x 1 liter in [place] ED .Further plan of care as per pulmonologist Dr Sandoval and icu team (28 May 2020 23:07)    PAST MEDICAL & SURGICAL HISTORY:  Pneumonia  Quadriplegia  COVID-19 virus detected  Advanced dementia  DM (diabetes mellitus)  HTN (hypertension)  CVA (cerebral vascular accident)  Respiratory failure  Constipation  GERD (gastroesophageal reflux disease)  Hypertension  Respiratory failure  Diabetes mellitus  Aphasic stroke  Dementia of frontal lobe type  Feeding by G-tube  Tracheostomy tube present  Tracheostomy in place  Gastrostomy in place  Hx of appendectomy      MEDICATIONS  (STANDING):  chlorhexidine 0.12% Liquid 15 milliLiter(s) Oral Mucosa every 12 hours  chlorhexidine 2% Cloths 1 Application(s) Topical daily  dextrose 5%. 1000 milliLiter(s) (50 mL/Hr) IV Continuous <Continuous>  dextrose 50% Injectable 12.5 Gram(s) IV Push once  dextrose 50% Injectable 25 Gram(s) IV Push once  dextrose 50% Injectable 25 Gram(s) IV Push once  enoxaparin Injectable 40 milliGRAM(s) SubCutaneous daily  fentaNYL   Patch  12 MICROgram(s)/Hr 1 Patch Transdermal every 72 hours  insulin glargine Injectable (LANTUS) 12 Unit(s) SubCutaneous at bedtime  insulin lispro (HumaLOG) corrective regimen sliding scale   SubCutaneous every 6 hours  pantoprazole  Injectable 40 milliGRAM(s) IV Push daily  polyethylene glycol 3350 17 Gram(s) Oral daily  saline laxative (FLEET) Rectal Enema 1 Enema Rectal <User Schedule>    MEDICATIONS  (PRN):  dextrose 40% Gel 15 Gram(s) Oral once PRN Blood Glucose LESS THAN 70 milliGRAM(s)/deciliter  glucagon  Injectable 1 milliGRAM(s) IntraMuscular once PRN Glucose LESS THAN 70 milligrams/deciliter  saline laxative (FLEET) Rectal Enema 1 Enema Rectal daily PRN constipation, to assist in bowel movement      OBJECTIVE    T(C): 37.3 (06-03-20 @ 03:19), Max: 37.9 (06-02-20 @ 16:33)  HR: 80 (06-03-20 @ 06:00) (73 - 111)  BP: 169/76 (06-03-20 @ 06:00) (132/63 - 187/85)  RR: 15 (06-03-20 @ 06:00) (14 - 35)  SpO2: 100% (06-03-20 @ 06:00) (93% - 100%)  Wt(kg): --  I&O's Summary    02 Jun 2020 07:01  -  03 Jun 2020 07:00  --------------------------------------------------------  IN: 2400 mL / OUT: 1050 mL / NET: 1350 mL          REVIEW OF SYSTEMS:  CONSTITUTIONAL: No fever, weight loss, or fatigue  EYES: No eye pain, visual disturbances, or discharge  ENMT:   No sinus or throat pain  NECK: No pain or stiffness  RESPIRATORY: No cough, wheezing, chills or hemoptysis; No shortness of breath  CARDIOVASCULAR: No chest pain, palpitations, dizziness, or leg swelling  GASTROINTESTINAL: No abdominal pain. No nausea, vomiting; No diarrhea or constipation. No melena or hematochezia.  GENITOURINARY: No dysuria, frequency, hematuria, or incontinence  NEUROLOGICAL: No headaches, memory loss, loss of strength, numbness, or tremors  SKIN: No itching, burning, rashes, or lesions   MUSCULOSKELETAL: No joint pain or swelling; No muscle, back, or extremity pain    PHYSICAL EXAM:  Appearance: NAD. VS past 24 hrs -as above   HEENT:   Moist oral mucosa. Conjunctiva clear b/l.   Neck : supple  Respiratory: Lungs CTAB.  Gastrointestinal:  Soft, nontender. No rebound. No rigidity. BS present	  Cardiovascular: RRR ,S1S2 present  Neurologic: Non-focal. Moving all extremities.  Extremities: No edema. No erythema. No calf tenderness.  Skin: No rashes, No ecchymoses, No cyanosis.	  wounds ,skin lesions-See skin assesment flow sheet   LABS:                        10.0   7.52  )-----------( 283      ( 03 Jun 2020 06:25 )             33.7     06-03    145  |  112<H>  |  29<H>  ----------------------------<  206<H>  4.1   |  26  |  0.75    Ca    9.0      03 Jun 2020 06:25  Phos  2.6     06-03  Mg     2.6     06-03    TPro  6.8  /  Alb  2.6<L>  /  TBili  0.4  /  DBili  x   /  AST  13<L>  /  ALT  18  /  AlkPhos  84  06-03    CAPILLARY BLOOD GLUCOSE      POCT Blood Glucose.: 222 mg/dL (03 Jun 2020 05:57)  POCT Blood Glucose.: 230 mg/dL (02 Jun 2020 23:11)  POCT Blood Glucose.: 247 mg/dL (02 Jun 2020 17:18)  POCT Blood Glucose.: 213 mg/dL (02 Jun 2020 11:12)          Culture - Sputum (collected 29 May 2020 21:16)  Source: .Sputum Sputum  Gram Stain (29 May 2020 22:19):    Few polymorphonuclear leukocytes per low power field    Few Squamous epithelial cells per low power field    Few Gram Negative Rods per oil power field  Final Report (31 May 2020 16:00):    Few Pseudomonas aeruginosa    Normal Respiratory Elvira present  Organism: Pseudomonas aeruginosa (31 May 2020 16:00)  Organism: Pseudomonas aeruginosa (31 May 2020 16:00)    Culture - Urine (collected 29 May 2020 01:06)  Source: .Urine Catheterized  Final Report (30 May 2020 07:47):    <10,000 CFU/mL Normal Urogenital Elvira    Culture - Blood (collected 29 May 2020 00:35)  Source: .Blood Blood-Peripheral  Final Report (03 Jun 2020 01:00):    No Growth Final    Culture - Blood (collected 29 May 2020 00:35)  Source: .Blood Blood-Peripheral  Final Report (03 Jun 2020 01:00):    No Growth Final      RADIOLOGY & ADDITIONAL TESTS:   reviewed elctronically  ASSESSMENT/PLAN: PROGRESS NOTE  Patient is a 74y old  Female who presents with a chief complaint of RESPIRATORY DISTRESS (03 Jun 2020 07:45)  Chart and available morning labs /imaging are reviewed electronically , urgent issues addressed . More information  is being added upon completion of rounds , when more information is collected and management discussed with consultants , medical staff and social service/case management on the floor     OVERNIGHT    24 hour events: No acute events overnight.    REVIEW OF SYSTEMS  Unable to gain meaningful ROS secondary to clinical status.  HPI:  74 Female transferred from  [facility] to [place] ED with report of sepsis, pneumonia, RYAN, patient on chronic trach collar , recent admission to [facility]  5/4/20 to 5/18/20 for hypoxia, pneumonia, COVID positive 04/27/20 ,developed hypoxia and was placed on respiratory support ,required ICU admission with shock and was placed on pressors  .Treated for UTI and pseudomonal pneumonia ,s/p cefepime ,had elevation of D-dimers .CTT was negative for PE , Doppler neg for DVT .Seen by neurologist for sz -like activity ,neurologist impression was that it was related to hypoxia CT Brain was negative for CVA .COVID  tested negative on 05/11 and 05/12  Patient had been sent to ER for respiratory distress ,associated with hypotension tachycardia ,tachypnea and diaphoresis  .Patient has a hx significant for Advanced dementia  ,Aphasic stroke  ,Constipation  ,COVID-19 virus detected  ,COPD ,Anxiety ,Dysphagia ,hyperkalemia ,pneumonia ,pulmonary edema , CVA (cerebral vascular accident)  ,Dementia of frontal lobe type  ,Diabetes mellitus  ,DM (diabetes mellitus)  ,GERD (gastroesophageal reflux disease)  ,HTN (hypertension)  ,Hypertension  ,Pneumonia  ,Quadriplegia  ,Respiratory failure  ,Feeding by G-tube  ,Gastrostomy in place  ,Hx of appendectomy  ,Tracheostomy in place  ,Tracheostomy tube present, 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 Admitted to ICU ,seen by pulm cons Dr Sandoval on admission to [place] ED ( transferred from [facility])and was admitted to ICU  for septic workup and evaluation ,send blood and urine cx, serial lactate levels ,monitor vitals closely hydration ,monitor urine output and renal profile ,iv abx initiated Palliative care consult requested ,to discuss advance directives and complete MOLST .Patient was diagnosed with sepsis 2/2 to pneumonia and  called ER respiratory and  ICU ,patient  already received Cefepime 2gm iv, vancomycin  1gm iv, rectal Tylenol 650mg, LR x 1 liter, NS x 1 liter in [place] ED .Further plan of care as per pulmonologist Dr Sandoval and icu team (28 May 2020 23:07)    PAST MEDICAL & SURGICAL HISTORY:  Pneumonia  Quadriplegia  COVID-19 virus detected  Advanced dementia  DM (diabetes mellitus)  HTN (hypertension)  CVA (cerebral vascular accident)  Respiratory failure  Constipation  GERD (gastroesophageal reflux disease)  Hypertension  Respiratory failure  Diabetes mellitus  Aphasic stroke  Dementia of frontal lobe type  Feeding by G-tube  Tracheostomy tube present  Tracheostomy in place  Gastrostomy in place  Hx of appendectomy      MEDICATIONS  (STANDING):  chlorhexidine 0.12% Liquid 15 milliLiter(s) Oral Mucosa every 12 hours  chlorhexidine 2% Cloths 1 Application(s) Topical daily  dextrose 5%. 1000 milliLiter(s) (50 mL/Hr) IV Continuous <Continuous>  dextrose 50% Injectable 12.5 Gram(s) IV Push once  dextrose 50% Injectable 25 Gram(s) IV Push once  dextrose 50% Injectable 25 Gram(s) IV Push once  enoxaparin Injectable 40 milliGRAM(s) SubCutaneous daily  fentaNYL   Patch  12 MICROgram(s)/Hr 1 Patch Transdermal every 72 hours  insulin glargine Injectable (LANTUS) 12 Unit(s) SubCutaneous at bedtime  insulin lispro (HumaLOG) corrective regimen sliding scale   SubCutaneous every 6 hours  pantoprazole  Injectable 40 milliGRAM(s) IV Push daily  polyethylene glycol 3350 17 Gram(s) Oral daily  saline laxative (FLEET) Rectal Enema 1 Enema Rectal <User Schedule>    MEDICATIONS  (PRN):  dextrose 40% Gel 15 Gram(s) Oral once PRN Blood Glucose LESS THAN 70 milliGRAM(s)/deciliter  glucagon  Injectable 1 milliGRAM(s) IntraMuscular once PRN Glucose LESS THAN 70 milligrams/deciliter  saline laxative (FLEET) Rectal Enema 1 Enema Rectal daily PRN constipation, to assist in bowel movement      OBJECTIVE    T(C): 37.3 (06-03-20 @ 03:19), Max: 37.9 (06-02-20 @ 16:33)  HR: 80 (06-03-20 @ 06:00) (73 - 111)  BP: 169/76 (06-03-20 @ 06:00) (132/63 - 187/85)  RR: 15 (06-03-20 @ 06:00) (14 - 35)  SpO2: 100% (06-03-20 @ 06:00) (93% - 100%)  Wt(kg): --  I&O's Summary    02 Jun 2020 07:01  -  03 Jun 2020 07:00  --------------------------------------------------------  IN: 2400 mL / OUT: 1050 mL / NET: 1350 mL    NOTE In accordance with  [facility] policy ICU final medical management decisions/MD orders are  determined/done only by ICU Intensivists           PHYSICAL EXAM:  HEENT: +trach  Resp: DC BS b/l  CVS: RRR  Abd: soft, NT/ND peg  Ext: no edema  Skin: warm well perfused  LABS:                        10.0   7.52  )-----------( 283      ( 03 Jun 2020 06:25 )             33.7     06-03    145  |  112<H>  |  29<H>  ----------------------------<  206<H>  4.1   |  26  |  0.75    Ca    9.0      03 Jun 2020 06:25  Phos  2.6     06-03  Mg     2.6     06-03    TPro  6.8  /  Alb  2.6<L>  /  TBili  0.4  /  DBili  x   /  AST  13<L>  /  ALT  18  /  AlkPhos  84  06-03    CAPILLARY BLOOD GLUCOSE      POCT Blood Glucose.: 222 mg/dL (03 Jun 2020 05:57)  POCT Blood Glucose.: 230 mg/dL (02 Jun 2020 23:11)  POCT Blood Glucose.: 247 mg/dL (02 Jun 2020 17:18)  POCT Blood Glucose.: 213 mg/dL (02 Jun 2020 11:12)          Culture - Sputum (collected 29 May 2020 21:16)  Source: .Sputum Sputum  Gram Stain (29 May 2020 22:19):    Few polymorphonuclear leukocytes per low power field    Few Squamous epithelial cells per low power field    Few Gram Negative Rods per oil power field  Final Report (31 May 2020 16:00):    Few Pseudomonas aeruginosa    Normal Respiratory Elvira present  Organism: Pseudomonas aeruginosa (31 May 2020 16:00)  Organism: Pseudomonas aeruginosa (31 May 2020 16:00)    Culture - Urine (collected 29 May 2020 01:06)  Source: .Urine Catheterized  Final Report (30 May 2020 07:47):    <10,000 CFU/mL Normal Urogenital Elvira    Culture - Blood (collected 29 May 2020 00:35)  Source: .Blood Blood-Peripheral  Final Report (03 Jun 2020 01:00):    No Growth Final    Culture - Blood (collected 29 May 2020 00:35)  Source: .Blood Blood-Peripheral  Final Report (03 Jun 2020 01:00):    No Growth Final      RADIOLOGY & ADDITIONAL TESTS: < from: CT Head No Cont (05.29.20 @ 21:27) >  EXAM:  CT BRAIN                            PROCEDURE DATE:  05/29/2020          INTERPRETATION:  NONCONTRAST CT OF THE BRAIN DATED 5/29/2020.    CLINICAL INFORMATION:  Possible seizure    TECHNIQUE: Axial CT images are obtained from the cranial vertex to the skull base without the administration of IV contrast. Images are reformatted in sagittal and coronal planes.    The study is correlated with a prior exam from 5/8/2020.    FINDINGS:    Evaluation is degraded by motion. There is no gross acute intra-axial or extra-axial hemorrhage. There is no significant mass effect or shift of the midline. There is marked cerebral volume loss with prominence of the ventricles and sulci. Chronic ischemic changes are seen in the frontoparietal white matter. There are atherosclerotic calcifications of the intracranial carotid arteries.    The regional soft tissues and osseous structures are unremarkable. The visualized paranasal sinuses and tympanic/mastoid cavities are clear apart from aerosolized secretions in the left sphenoid sinus, small mucous retention cyst versus polyp in the right sphenoid sinus, and a right mastoid effusion.    IMPRESSION:    Motion degraded exam shows no gross acute intracranial hemorrhage or mass effect. No significantinterval change compared to the prior exam from 5/8/2020.    < end of copied text >     reviewed elctronically  ASSESSMENT/PLAN:

## 2020-06-03 NOTE — PROGRESS NOTE ADULT - ATTENDING COMMENTS
74 Female transferred from  [facility] center to [place] ED with report of sepsis, pneumonia, RYAN, patient on chronic trach collar , recent admission to [facility]  5/4/20 to 5/18/20 for hypoxia, pneumonia, COVID positive 04/27/20 ,developed hypoxia and was placed on respiratory support ,required ICU admission with shock and was placed on pressors  .Treated for UTI and pseudomonal pneumonia ,s/p cefepime ,had elevation of D-dimers .CTT was negative for PE , Doppler neg for DVT .Seen by neurologist for sz -like activity ,neurologist impression was that it was related to hypoxia CT Brain was negative for CVA .COVID  tested negative on 05/11 and 05/12  Patient had been sent to ER for respiratory distress ,associated with hypotension tachycardia ,tachypnea and diaphoresis  .Patient has a hx significant for Advanced dementia  ,Aphasic stroke  ,Constipation  ,COVID-19 virus detected  ,COPD ,Anxiety ,Dysphagia ,hyperkalemia ,pneumonia ,pulmonary edema , CVA (cerebral vascular accident)  ,Dementia of frontal lobe type  ,Diabetes mellitus  ,DM (diabetes mellitus)  ,GERD (gastroesophageal reflux disease)  ,HTN (hypertension)  ,Hypertension  ,Pneumonia  ,Quadriplegia  ,Respiratory failure  ,Feeding by G-tube  ,Gastrostomy in place  ,Hx of appendectomy  ,Tracheostomy in place  ,Tracheostomy tube present ,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 Admitted to ICU ,seen by pulm cons Dr Rojelio Sandoval on admission to [place] ED ( transferred from [facility])and was admitted to ICU  for septic workup and evaluation ,send blood and urine cx, serial lactate levels ,monitor vitals closely hydration ,monitor urine output and renal profile ,iv abx initiated . Palliative care consult requested ,to discuss advance directives and complete MOLST .Patient was diagnosed with sepsis 2/2 to pneumonia and  called ER respiratory and  ICU ,patient  already received Cefepime 2gm iv, vancomycin  1gm iv, rectal Tylenol 650mg, LR x 1 liter, NS x 1 liter in [place] ED . Treated for sepsis (resolved )2/2 to probable  pneumonia ,poa -was on vanco and zosyn ,seen by ID CONSULT-  watch off abx ,  pseudomonas in sputum likely colonized - monitor off Abx; repeated  Covid negative CLEARED FOR RETURN TO [facility] 06/02/20 ,as per social service  is not in agreement with plan and would like to discuss other placement options and appealing discharge .

## 2020-06-03 NOTE — DISCHARGE NOTE PROVIDER - HOSPITAL COURSE
Patient is a 74y old  Female who presents with a chief complaint of RESPIRATORY DISTRESS (03 Jun 2020 10:04)            FROM ADMISSION H+P:     HPI:    74 Female transferred from  [facility] center to [place] ED with report of sepsis, pneumonia, RYAN, patient on chronic trach collar , recent admission to [facility]  5/4/20 to 5/18/20 for hypoxia, pneumonia, COVID positive 04/27/20 ,developed hypoxia and was placed on respiratory support ,required ICU admission with shock and was placed on pressors  .Treated for UTI and pseudomonal pneumonia ,s/p cefepime ,had elevation of D-dimers .CTT was negative for PE , Doppler neg for DVT .Seen by neurologist for sz -like activity ,neurologist impression was that it was related to hypoxia CT Brain was negative for CVA .COVID  tested negative on 05/11 and 05/12  Patient had been sent to ER for respiratory distress ,associated with hypotension tachycardia ,tachypnea and diaphoresis  .Patient has a hx significant for Advanced dementia  ,Aphasic stroke  ,Constipation  ,COVID-19 virus detected  ,COPD ,Anxiety ,Dysphagia ,hyperkalemia ,pneumonia ,pulmonary edema , CVA (cerebral vascular accident)  ,Dementia of frontal lobe type  ,Diabetes mellitus  ,DM (diabetes mellitus)  ,GERD (gastroesophageal reflux disease)  ,HTN (hypertension)  ,Hypertension  ,Pneumonia  ,Quadriplegia  ,Respiratory failure  ,Feeding by G-tube  ,Gastrostomy in place  ,Hx of appendectomy  ,Tracheostomy in place  ,Tracheostomy tube present, 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 Admitted to ICU ,seen by pulm cons Dr Sandoval on admission to [place] ED ( transferred from [facility])and was admitted to ICU  for septic workup and evaluation ,send blood and urine cx, serial lactate levels ,monitor vitals closely hydration ,monitor urine output and renal profile ,iv abx initiated Palliative care consult requested ,to discuss advance directives and complete MOLST .Patient was diagnosed with sepsis 2/2 to pneumonia and  called ER respiratory and  ICU ,patient  already received Cefepime 2gm iv, vancomycin  1gm iv, rectal Tylenol 650mg, LR x 1 liter, NS x 1 liter in [place] ED .Further plan of care as per pulmonologist Dr Sandoval and icu team (28 May 2020 23:07)            ----    ICU COURSE: Patient admitted to ICU for further management of severe lactic acidosis suspected to be due to sepsis from unknown source versus due to seizure activity. ID Dr. Olivas was consulted and patient was started on IV antibiotics for suspected infectious source of sepsis which were discontinued as blood and urine cultures were found to be negative and patient did not demonstrate further signs or symptoms of infection. Neuro, Dr. Shin, was consulted and due to patient's extensive prior workup as outpatient for seizures which was unrevealing for seizure activity did not recommend antiepileptic treatment to be initiated. Palliative care, Dr. Andrea, was consulted for discussion of goals of care and patient remained full code. Patient was found to be COVID PCR negative with positive antibodies for COVID. Lactate downtrended to within normal limits without further evidence of seizure activity during her stay. Patient was continued on full support mechanical ventilation with chronic vent settings, tube feeds via PEG, and chronic reid catheter for urinary retention. Patient's diabetes was managed with basal and sliding scale insulin regimens. Patient improved over the course of her stay and was medically stablilized for discharge back to skilled nursing facility.         -----    CONSULTANTS:    Palliative Care/Pulmonology Dr. Andrea    Neuro Dr. Shin    ID Dr. Olivas

## 2020-06-03 NOTE — PROGRESS NOTE ADULT - ASSESSMENT
74 Female transferred from  [facility] center to [place] ED with report of sepsis, pneumonia, RYAN, patient on chronic trach collar , recent admission to [facility]  5/4/20 to 5/18/20 for hypoxia, pneumonia, COVID positive 04/27/20 ,developed hypoxia and was placed on respiratory support ,required ICU admission with shock and was placed on pressors  .Treated for UTI and pseudomonal pneumonia ,s/p cefepime ,had elevation of D-dimers .CTT was negative for PE , Doppler neg for DVT .Seen by neurologist for sz -like activity ,neurologist impression was that it was related to hypoxia CT Brain was negative for CVA .COVID  tested negative on 05/11 and 05/12  Patient had been sent to ER for respiratory distress ,associated with hypotension tachycardia ,tachypnea and diaphoresis  .Patient has a hx significant for Advanced dementia  ,Aphasic stroke  ,Constipation  ,COVID-19 virus detected  ,COPD ,Anxiety ,Dysphagia ,hyperkalemia ,pneumonia ,pulmonary edema , CVA (cerebral vascular accident)  ,Dementia of frontal lobe type  ,Diabetes mellitus  ,DM (diabetes mellitus)  ,GERD (gastroesophageal reflux disease)  ,HTN (hypertension)  ,Hypertension  ,Pneumonia  ,Quadriplegia  ,Respiratory failure  ,Feeding by G-tube  ,Gastrostomy in place  ,Hx of appendectomy  ,Tracheostomy in place  ,Tracheostomy tube present ,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 Admitted to ICU ,seen by pulm cons Dr Sandoval on admission to [place] ED ( transferred from [facility])and was admitted to ICU  for septic workup and evaluation ,send blood and urine cx, serial lactate levels ,monitor vitals closely hydration ,monitor urine output and renal profile ,iv abx initiated Palliative care consult requested ,to discuss advance directives and complete MOLST Patient was diagnosed with sepsis 2/2 to pneumonia and  called ER respiratory and  ICU ,patient  already received Cefepime 2gm iv, vancomycin  1gm iv, rectal Tylenol 650mg, LR x 1 liter, NS x 1 liter in [place] ED .Further plan of care as per pulmonologist Dr Sandoval and icu team

## 2020-06-03 NOTE — PROGRESS NOTE ADULT - NSREFPHYEXREFTO_GEN_ALL_CORE
Other
Inpatient Physical Exam

## 2020-06-03 NOTE — PROGRESS NOTE ADULT - ASSESSMENT
cont rx cont rx    PARASHARAMI BREA  1945 DOA 2020 DR ILIANA KEMP     REVIEW OF SYMPTOMS      Able to give ROS  NO     PHYSICAL EXAM    HEENT Unremarkable PERRLA atraumatic   RESP Fair air entry EXP prolonged    Harsh breath sound Resp distres mild   CARDIAC S1 S2 No S3     NO JVD    ABDOMEN SOFT BS PRESENT NOT DISTENDED No hepatosplenomegaly PEDAL EDEMA present No calf tenderness  NO rash   GENERAL Not TOXIC looking    OXYGENATION        VITALS/LABS   6/3/2020 73 118/58   6/3/2020 ac 14/420//.3    PT DATA/BEST PRACTICE  ALLERGY         codeine             WT     63 (2020)                                 BMI     23 (2020)                        CrCl                                  ADVANCED DIRECTIVE     HEAD OF BED ELEVATION Yes      INFECTION PPLX     Chlorhexidine 2% ()   Chlorhexidine .12% ()   DVT PROPHYLAXIS   hpsc ()  --> lvnx 40 ()               SQUIRES PROPHYLAXIS    Protonix 40 ()       DYSPHAGIA EVAL     DIET     npo () --> TF Flucerna 1.5 1100 (529)    FREE WATER 300.4 ()                                                                  IV F    (-)   ABIO   zosyn (-)   Vanco 1.2 (-)     PATIENT SUMMARY     74 f PMH aphasic stroke functional quad dementia GERD trach vent dependent recently hospitalisd [facility] was COVID P+vinnie 2020 was admitted 2020 for dyspnea possible sepsis Pulm consulted     home meds psyllium feso4 insulin fentanyl 12 glarigine 20 lvnx 40                                          PAST ADMISSION -2020 [facility]     PATIENT DATA/ASSESSMENT/RECOMMENDATIONS     RESP DISTRESS POA   Possibly  sec to Seizures vs Ventilator associated pneumonia (WBC Infiltrates)  Improved    MECHANICAL VENT   Monitor abg Target po 90-95% Vent protocol Trach care   Infection ppx  Gas exchange   showed resp acidosis     INFECTION  COVID  PCR N-VINNIE    FEVER POSSIBLE PNEUMONIA POA 2020   HO recent hospital stay   On vent Leukocytosis poa   Vanco zosyn  DCed 2020 as cultures N-VINNIE   sputum c showed Pseudomonas This is likely colonization   Dr Olivas on case          LACTICEMIA POA   Cause of LA may have been unwitnessed sz or sepsis   IV fluids and monitor serially       ELEVATED D-DIMR POA   ELEVATED PTT POA   Was on lovenox pplx at [facility]  2020 Suggest Check  V duplex  W  2020 Check  cta ch     HYPERNATREMIA NOTED 2020   started on fw 2020     RYAN POA   IV fluids   Improved    ANEMIA   Hb stable     DM   sl scale     RO SEIZURE   CT H N-vinnie     PLAN   Infection felt to be unlikely and abio dced   FW started 2020 for elevated Na     TIME SPENT Over 25 minutes aggregate care time spent on encounter; activities included   direct pt care, counseling and/or coordinating care reviewing notes, lab data/ imaging , discussion with multidisciplinary team/ pt /family. Risks, benefits, alternatives  discussed in detail.    CHAPINCITO GUIDRY  1945 DOA 2020 DR ILIANA KEMP

## 2020-06-03 NOTE — DISCHARGE NOTE PROVIDER - INSTRUCTIONS
NPO w/tube feeds NPO w/tube feeds via Gastrojejunostomy w/Glucerna 1.5 continuously 50mL/hr, total volume for 24hours: 1100 mL    Free water 300mL Q6H

## 2020-06-03 NOTE — DISCHARGE NOTE PROVIDER - NSDCMRMEDTOKEN_GEN_ALL_CORE_FT
Acidophilus oral capsule: 1 cap(s) by gastrostomy tube 2 times a day  atropine 1% ophthalmic solution: instill 1 drop under tongue 3 times a day as needed   Calcarb with D: 500mg-600unit tablet    1 tablet by g-tube twice a day   enoxaparin 40 mg/0.4 mL injectable solution: 40 milligram(s) injectable once a day  fentaNYL 12 mcg/hr transdermal film, extended release: 1 film(s) transdermal every 72 hours  ferrous sulfate 300 mg/5 mL (60 mg/5 mL elemental iron) oral liquid: 5 milliliter(s) by gastrostomy tube once a day  Lantus Solostar Pen 100 units/mL subcutaneous solution: 25 unit(s) subcutaneous once a day (at bedtime)  Metamucil 3.4 g/7 g oral powder for reconstitution: 17 gram(s) by gastrostomy tube once a day (at bedtime)  Pepcid 20 mg oral tablet: 2 tab(s) by gastrostomy tube once a day  sucralfate 1 g oral tablet: 1 tab(s) by gastrostomy tube 2 times a day

## 2020-06-03 NOTE — PROGRESS NOTE ADULT - ATTENDING COMMENTS
74F h/o frontotemporal dementia, chronic vent dependent respiratory failure, bedbound presenting with respiratory distress, diaphoresis and generalized spasm of extremities concerning for seizure activity vs sepsis without recurrent episodes in ICU.     Neuro: no acute issues, no suspicious seizure activity witness - continue to monitor; continue fentanyl patch for pain  CV: hemodynamically stable  Pulm: did not initiate breaths when placed on SBT - continue full vent support for chronic resp failure  GI: continue enteral feeds via PEG; continue bowel regimen  Renal: chronic vaughn; hypernatremia resolved  ID: pseudomonas in sputum likely colonized - monitor off Abx; repeat Covid negative x2  Endo: required 30units of insulin total in past 24hrs - will increase to Lantus 20units and continue ISS coverage with q6hr FS    Remains stable for discharge back to SNF. Discussed with  in great detail.

## 2020-06-03 NOTE — PROGRESS NOTE ADULT - SUBJECTIVE AND OBJECTIVE BOX
BREA BECKHAM    [facility] ICU1 02    Patient is a 74y old  Female who presents with a chief complaint of RESPIRATORY DISTRESS (03 Jun 2020 07:50)       Allergies    codeine (Hives)    Intolerances        HPI:  74 Female transferred from  [facility] to [facility] with report of sepsis, pneumonia, RYAN, patient on chronic trach collar , recent admission to [facility]  5/4/20 to 5/18/20 for hypoxia, pneumonia, COVID positive 04/27/20 ,developed hypoxia and was placed on respiratory support ,required ICU admission with shock and was placed on pressors  .Treated for UTI and pseudomonal pneumonia ,s/p cefepime ,had elevation of D-dimers .CTT was negative for PE , Doppler neg for DVT .Seen by neurologist for sz -like activity ,neurologist impression was that it was related to hypoxia CT Brain was negative for CVA .COVID  tested negative on 05/11 and 05/12  Patient had been sent to ER for respiratory distress ,associated with hypotension tachycardia ,tachypnea and diaphoresis  .Patient has a hx significant for Advanced dementia  ,Aphasic stroke  ,Constipation  ,COVID-19 virus detected  ,COPD ,Anxiety ,Dysphagia ,hyperkalemia ,pneumonia ,pulmonary edema , CVA (cerebral vascular accident)  ,Dementia of frontal lobe type  ,Diabetes mellitus  ,DM (diabetes mellitus)  ,GERD (gastroesophageal reflux disease)  ,HTN (hypertension)  ,Hypertension  ,Pneumonia  ,Quadriplegia  ,Respiratory failure  ,Feeding by G-tube  ,Gastrostomy in place  ,Hx of appendectomy  ,Tracheostomy in place  ,Tracheostomy tube present, 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 Admitted to ICU ,seen by pulm cons Dr Sandoval on admission to [place] ED ( transferred from [facility])and was admitted to ICU  for septic workup and evaluation ,send blood and urine cx, serial lactate levels ,monitor vitals closely hydration ,monitor urine output and renal profile ,iv abx initiated Palliative care consult requested ,to discuss advance directives and complete MOLST .Patient was diagnosed with sepsis 2/2 to pneumonia and  called ER respiratory and  ICU ,patient  already received Cefepime 2gm iv, vancomycin  1gm iv, rectal Tylenol 650mg, LR x 1 liter, NS x 1 liter in [facility] .Further plan of care as per pulmonologist Dr Sandoval and icu team (28 May 2020 23:07)      PAST MEDICAL & SURGICAL HISTORY:  Pneumonia  Quadriplegia  COVID-19 virus detected  Advanced dementia  DM (diabetes mellitus)  HTN (hypertension)  CVA (cerebral vascular accident)  Respiratory failure  Constipation  GERD (gastroesophageal reflux disease)  Hypertension  Respiratory failure  Diabetes mellitus  Aphasic stroke  Dementia of frontal lobe type  Feeding by G-tube  Tracheostomy tube present  Tracheostomy in place  Gastrostomy in place  Hx of appendectomy      FAMILY HISTORY:  No pertinent family history in first degree relatives        MEDICATIONS   chlorhexidine 0.12% Liquid 15 milliLiter(s) Oral Mucosa every 12 hours  chlorhexidine 2% Cloths 1 Application(s) Topical daily  dextrose 40% Gel 15 Gram(s) Oral once PRN  dextrose 5%. 1000 milliLiter(s) IV Continuous <Continuous>  dextrose 50% Injectable 12.5 Gram(s) IV Push once  dextrose 50% Injectable 25 Gram(s) IV Push once  dextrose 50% Injectable 25 Gram(s) IV Push once  enoxaparin Injectable 40 milliGRAM(s) SubCutaneous daily  fentaNYL   Patch  12 MICROgram(s)/Hr 1 Patch Transdermal every 72 hours  glucagon  Injectable 1 milliGRAM(s) IntraMuscular once PRN  insulin glargine Injectable (LANTUS) 20 Unit(s) SubCutaneous at bedtime  insulin lispro (HumaLOG) corrective regimen sliding scale   SubCutaneous every 6 hours  pantoprazole  Injectable 40 milliGRAM(s) IV Push daily  polyethylene glycol 3350 17 Gram(s) Oral daily  saline laxative (FLEET) Rectal Enema 1 Enema Rectal <User Schedule>  saline laxative (FLEET) Rectal Enema 1 Enema Rectal daily PRN      Vital Signs Last 24 Hrs  T(C): 37.4 (03 Jun 2020 07:54), Max: 37.9 (02 Jun 2020 16:33)  T(F): 99.4 (03 Jun 2020 07:54), Max: 100.2 (02 Jun 2020 16:33)  HR: 99 (03 Jun 2020 07:55) (73 - 111)  BP: 169/76 (03 Jun 2020 06:00) (132/63 - 187/85)  BP(mean): 109 (03 Jun 2020 06:00) (88 - 122)  RR: 15 (03 Jun 2020 06:00) (14 - 35)  SpO2: 99% (03 Jun 2020 07:55) (93% - 100%)      06-02-20 @ 07:01  -  06-03-20 @ 07:00  --------------------------------------------------------  IN: 2400 mL / OUT: 1050 mL / NET: 1350 mL        Mode: AC/ CMV (Assist Control/ Continuous Mandatory Ventilation), RR (machine): 14, TV (machine): 420, FiO2: 30, PEEP: 5, ITime: 1, MAP: 15, PIP: 34    LABS:                        10.0   7.52  )-----------( 283      ( 03 Jun 2020 06:25 )             33.7     06-03    145  |  112<H>  |  29<H>  ----------------------------<  206<H>  4.1   |  26  |  0.75    Ca    9.0      03 Jun 2020 06:25  Phos  2.6     06-03  Mg     2.6     06-03    TPro  6.8  /  Alb  2.6<L>  /  TBili  0.4  /  DBili  x   /  AST  13<L>  /  ALT  18  /  AlkPhos  84  06-03              WBC:  WBC Count: 7.52 K/uL (06-03 @ 06:25)  WBC Count: 6.33 K/uL (06-02 @ 05:50)  WBC Count: 8.70 K/uL (06-01 @ 05:43)  WBC Count: 6.78 K/uL (05-31 @ 05:55)      MICROBIOLOGY:  RECENT CULTURES:  05-29 .Sputum Sputum Pseudomonas aeruginosa   Few polymorphonuclear leukocytes per low power field  Few Squamous epithelial cells per low power field  Few Gram Negative Rods per oil power field   Few Pseudomonas aeruginosa  Normal Respiratory Elvira present    05-29 .Urine Catheterized XXXX XXXX   <10,000 CFU/mL Normal Urogenital Elvira    05-29 .Blood Blood-Peripheral XXXX XXXX   No Growth Final                    Sodium:  Sodium, Serum: 145 mmol/L (06-03 @ 06:25)  Sodium, Serum: 144 mmol/L (06-02 @ 16:33)  Sodium, Serum: 151 mmol/L (06-02 @ 05:50)  Sodium, Serum: 144 mmol/L (06-01 @ 05:43)  Sodium, Serum: 143 mmol/L (05-31 @ 05:55)      0.75 mg/dL 06-03 @ 06:25  0.78 mg/dL 06-02 @ 16:33  0.82 mg/dL 06-02 @ 05:50  0.88 mg/dL 06-01 @ 05:43  0.77 mg/dL 05-31 @ 05:55      Hemoglobin:  Hemoglobin: 10.0 g/dL (06-03 @ 06:25)  Hemoglobin: 9.5 g/dL (06-02 @ 05:50)  Hemoglobin: 9.8 g/dL (06-01 @ 05:43)  Hemoglobin: 9.2 g/dL (05-31 @ 05:55)      Platelets: Platelet Count - Automated: 283 K/uL (06-03 @ 06:25)  Platelet Count - Automated: 269 K/uL (06-02 @ 05:50)  Platelet Count - Automated: 254 K/uL (06-01 @ 05:43)  Platelet Count - Automated: 233 K/uL (05-31 @ 05:55)      LIVER FUNCTIONS - ( 03 Jun 2020 06:25 )  Alb: 2.6 g/dL / Pro: 6.8 g/dL / ALK PHOS: 84 U/L / ALT: 18 U/L / AST: 13 U/L / GGT: x                 RADIOLOGY & ADDITIONAL STUDIES:

## 2020-06-03 NOTE — DISCHARGE NOTE PROVIDER - NSDCCPCAREPLAN_GEN_ALL_CORE_FT
PRINCIPAL DISCHARGE DIAGNOSIS  Diagnosis: Lactic acidosis  Assessment and Plan of Treatment: You were admitted to the hospital with increased levels of lactate suspected to be due to seizures versus infection. You received IV antibiotics and then were monitored off antibiotics without any further signs of infection. You were seen by a neurologist and due to extensive prior workup which was negative for seizure activity, you were not started on any antiseizure medications. You lactate normalized during your stay.   Follow up with your primary care doctor within one week of discharge from the hospital.      SECONDARY DISCHARGE DIAGNOSES  Diagnosis: Stiff muscles  Assessment and Plan of Treatment: Continue fentanyl patches as prescribed by your doctor.    Diagnosis: DM (diabetes mellitus)  Assessment and Plan of Treatment: Continue home insulin regimen as prescribed with close monitoring of blood glucose.    Diagnosis: Chronic respiratory failure  Assessment and Plan of Treatment: Continue mechanical ventilation for chronic ventilatory-dependent respiratory failure. PRINCIPAL DISCHARGE DIAGNOSIS  Diagnosis: Lactic acidosis  Assessment and Plan of Treatment: You were admitted to the hospital with increased levels of lactate suspected to be due to seizures versus infection. You received IV antibiotics and then were monitored off antibiotics without any further signs of infection. You were seen by a neurologist and due to extensive prior workup which was negative for seizure activity, you were not started on any antiseizure medications. You lactate normalized during your stay.   Follow up with your primary care doctor within one week of discharge from the hospital.      SECONDARY DISCHARGE DIAGNOSES  Diagnosis: Hypernatremia  Assessment and Plan of Treatment: Hypernatremia likely secondary to dehydration, continue free water 300mL Q6H.    Diagnosis: Stiff muscles  Assessment and Plan of Treatment: Continue fentanyl patches as prescribed by your doctor.    Diagnosis: DM (diabetes mellitus)  Assessment and Plan of Treatment: Continue home insulin regimen as prescribed with close monitoring of blood glucose.    Diagnosis: Chronic respiratory failure  Assessment and Plan of Treatment: Continue mechanical ventilation for chronic ventilatory-dependent respiratory failure.

## 2020-06-03 NOTE — PROGRESS NOTE ADULT - ASSESSMENT
73 y/o woman with PMHx of frontotemporal dementia, chronic vent dependent respiratory failure, bedbound at baseline who presents with respiratory distress, diaphoresis, and generalized spasm of extremities.  She was found to be febrile, hypertensive, tachycardic, and tachypneic on admission with severe lactic acidosis.  Concern for seizure activity with elevated lactate, initially downtrended with uptrend due to possible repeat seizure activity. Afebrile since yesterday afternoon, but otherwise resolved signs of infection and tolerating chronic vent settings.     Neuro: Concern for seizure activity on admission in light of severe lactic acidosis and elevated prolactin, no evidence currently. Extensive neurological w/u in past, including 3-day EEG, without evidence of seizure activity. No need for antiepileptic medications as per neuro. Abnormal stiffening likely 2/2 neurodegenerative process.   CV: Hemodynamically stable.   Pulm: Chronic vent-dependent respiratory failure, not a candidate for weaning trials. Continue full support to keep SpO2 >90%, settings: AC/14/420/30/5 currently.  Renal: Normal renal function. Monitor and supplement lytes PRN. Hypernatremia improved, continue free water 300cc Q6H and monitor BMP.  GI: Continue TFs 2/2 dysphagia. Continue bowel regimen for chronic constipation with miralax, fleet enema's MWF, and PRN fleet enemas at request of patient's family. Protonix for GI ppx.  ID: Trach aspirate cx from 5/29 with few pseudomonas, likely colonizer rather than true infection. UCx and BCx negative, MRSA negative, and COVID negative. COVID Ab positive. Continue to monitor off antibiotics.  Endo: FSGs suboptimal, on moderate dose SS Q6H, increased to 12U lantus QHS yesterday will increase to _______ QHS tonight.  : Chronic vaughn catheter in place, continue for urinary retention.  Heme: Lovenox 40mg SQ daily for DVT ppx.  Dispo: Stable for d/c back to SNF. COVID PCR negative 6/2, repeat COVID PCR ________. COVID Ab positive.     . 75 y/o woman with PMHx of frontotemporal dementia, chronic vent dependent respiratory failure, bedbound at baseline who presents with respiratory distress, diaphoresis, and generalized spasm of extremities.  She was found to be febrile, hypertensive, tachycardic, and tachypneic on admission with severe lactic acidosis.  Concern for seizure activity with elevated lactate, initially downtrended with uptrend due to possible repeat seizure activity. Afebrile since yesterday afternoon, but otherwise resolved signs of infection and tolerating chronic vent settings.     Neuro: Concern for seizure activity on admission in light of severe lactic acidosis and elevated prolactin, no evidence currently. Extensive neurological w/u in past, including 3-day EEG, without evidence of seizure activity. No need for antiepileptic medications as per neuro. Abnormal stiffening likely 2/2 neurodegenerative process.   CV: Hemodynamically stable.   Pulm: Chronic vent-dependent respiratory failure, not a candidate for weaning trials. Continue full support to keep SpO2 >90%, settings: AC/14/420/30/5 currently.  Renal: Normal renal function. Monitor and supplement lytes PRN. Hypernatremia improved, continue free water 300cc Q6H and monitor BMP.  GI: Continue TFs 2/2 dysphagia. Continue bowel regimen for chronic constipation with miralax, fleet enema's MWF, and PRN fleet enemas at request of patient's family. Protonix for GI ppx.  ID: Trach aspirate cx from 5/29 with few pseudomonas, likely colonizer rather than true infection. UCx and BCx negative, MRSA negative, and COVID negative. COVID Ab positive. Continue to monitor off antibiotics.  Endo: FSGs suboptimal, on moderate dose SS Q6H, increased to 12U lantus QHS yesterday will increase to 20U QHS tonight.  : Chronic vaughn catheter in place, continue for urinary retention.  Heme: Lovenox 40mg SQ daily for DVT ppx.  Dispo: Stable for d/c back to SNF. COVID PCR negative 6/2, repeat COVID PCR today pending. COVID Ab positive.     .

## 2020-06-04 LAB
ANION GAP SERPL CALC-SCNC: 7 MMOL/L — SIGNIFICANT CHANGE UP (ref 5–17)
BUN SERPL-MCNC: 21 MG/DL — SIGNIFICANT CHANGE UP (ref 7–23)
CALCIUM SERPL-MCNC: 8.5 MG/DL — SIGNIFICANT CHANGE UP (ref 8.5–10.1)
CHLORIDE SERPL-SCNC: 112 MMOL/L — HIGH (ref 96–108)
CO2 SERPL-SCNC: 27 MMOL/L — SIGNIFICANT CHANGE UP (ref 22–31)
CREAT SERPL-MCNC: 0.73 MG/DL — SIGNIFICANT CHANGE UP (ref 0.5–1.3)
GLUCOSE SERPL-MCNC: 189 MG/DL — HIGH (ref 70–99)
HCT VFR BLD CALC: 31.8 % — LOW (ref 34.5–45)
HGB BLD-MCNC: 9.5 G/DL — LOW (ref 11.5–15.5)
LACTATE SERPL-SCNC: 1.1 MMOL/L — SIGNIFICANT CHANGE UP (ref 0.7–2)
MAGNESIUM SERPL-MCNC: 2.5 MG/DL — SIGNIFICANT CHANGE UP (ref 1.6–2.6)
MCHC RBC-ENTMCNC: 27.1 PG — SIGNIFICANT CHANGE UP (ref 27–34)
MCHC RBC-ENTMCNC: 29.9 GM/DL — LOW (ref 32–36)
MCV RBC AUTO: 90.6 FL — SIGNIFICANT CHANGE UP (ref 80–100)
NRBC # BLD: 0 /100 WBCS — SIGNIFICANT CHANGE UP (ref 0–0)
PHOSPHATE SERPL-MCNC: 2.6 MG/DL — SIGNIFICANT CHANGE UP (ref 2.5–4.5)
PLATELET # BLD AUTO: 281 K/UL — SIGNIFICANT CHANGE UP (ref 150–400)
POTASSIUM SERPL-MCNC: 3.7 MMOL/L — SIGNIFICANT CHANGE UP (ref 3.5–5.3)
POTASSIUM SERPL-SCNC: 3.7 MMOL/L — SIGNIFICANT CHANGE UP (ref 3.5–5.3)
RBC # BLD: 3.51 M/UL — LOW (ref 3.8–5.2)
RBC # FLD: 16 % — HIGH (ref 10.3–14.5)
SODIUM SERPL-SCNC: 146 MMOL/L — HIGH (ref 135–145)
WBC # BLD: 7.92 K/UL — SIGNIFICANT CHANGE UP (ref 3.8–10.5)
WBC # FLD AUTO: 7.92 K/UL — SIGNIFICANT CHANGE UP (ref 3.8–10.5)

## 2020-06-04 PROCEDURE — 99232 SBSQ HOSP IP/OBS MODERATE 35: CPT

## 2020-06-04 RX ORDER — FENTANYL CITRATE 50 UG/ML
1 INJECTION INTRAVENOUS
Refills: 0 | Status: DISCONTINUED | OUTPATIENT
Start: 2020-06-04 | End: 2020-06-05

## 2020-06-04 RX ADMIN — FENTANYL CITRATE 1 PATCH: 50 INJECTION INTRAVENOUS at 07:42

## 2020-06-04 RX ADMIN — CHLORHEXIDINE GLUCONATE 15 MILLILITER(S): 213 SOLUTION TOPICAL at 17:32

## 2020-06-04 RX ADMIN — FENTANYL CITRATE 1 PATCH: 50 INJECTION INTRAVENOUS at 11:43

## 2020-06-04 RX ADMIN — FENTANYL CITRATE 1 PATCH: 50 INJECTION INTRAVENOUS at 11:37

## 2020-06-04 RX ADMIN — Medication 4: at 23:05

## 2020-06-04 RX ADMIN — INSULIN GLARGINE 20 UNIT(S): 100 INJECTION, SOLUTION SUBCUTANEOUS at 22:54

## 2020-06-04 RX ADMIN — Medication 6: at 11:44

## 2020-06-04 RX ADMIN — ENOXAPARIN SODIUM 40 MILLIGRAM(S): 100 INJECTION SUBCUTANEOUS at 11:43

## 2020-06-04 RX ADMIN — CHLORHEXIDINE GLUCONATE 15 MILLILITER(S): 213 SOLUTION TOPICAL at 05:45

## 2020-06-04 RX ADMIN — CHLORHEXIDINE GLUCONATE 1 APPLICATION(S): 213 SOLUTION TOPICAL at 11:43

## 2020-06-04 RX ADMIN — FENTANYL CITRATE 1 PATCH: 50 INJECTION INTRAVENOUS at 20:37

## 2020-06-04 RX ADMIN — Medication 2: at 05:46

## 2020-06-04 RX ADMIN — Medication 4: at 17:31

## 2020-06-04 RX ADMIN — PANTOPRAZOLE SODIUM 40 MILLIGRAM(S): 20 TABLET, DELAYED RELEASE ORAL at 11:43

## 2020-06-04 NOTE — PROGRESS NOTE ADULT - SUBJECTIVE AND OBJECTIVE BOX
Patient is a 74y old  Female who presents with a chief complaint of RESPIRATORY DISTRESS (03 Jun 2020 14:15)    24 hour events: No acute events overnight.    REVIEW OF SYSTEMS  Unable to obtain meaningful ROS secondary to clinical status.    T(F): 98.4 (06-04-20 @ 07:34), Max: 100.7 (06-03-20 @ 17:33)  HR: 80 (06-04-20 @ 07:00) (69 - 92)  BP: 114/66 (06-04-20 @ 07:00) (105/71 - 175/72)  RR: 18 (06-04-20 @ 07:00) (14 - 25)  SpO2: 97% (06-04-20 @ 07:00) (95% - 100%)    Mode: AC/ CMV (Assist Control/ Continuous Mandatory Ventilation), RR (machine): 14, TV (machine): 420, FiO2: 30, PEEP: 5        I&O's Summary    06-03 @ 07:01  -  06-04 @ 07:00  --------------------------------------------------------  IN: 1650 mL / OUT: 790 mL / NET: 860 mL      PHYSICAL EXAM  General:   CNS:   HEENT:   Resp:   CVS:   Abd:   Ext:   Skin:     MEDICATIONS      dextrose 40% Gel Oral PRN  dextrose 50% Injectable IV Push  dextrose 50% Injectable IV Push  dextrose 50% Injectable IV Push  glucagon  Injectable IntraMuscular PRN  insulin glargine Injectable (LANTUS) SubCutaneous  insulin lispro (HumaLOG) corrective regimen sliding scale SubCutaneous      fentaNYL   Patch  12 MICROgram(s)/Hr Transdermal      enoxaparin Injectable SubCutaneous    pantoprazole  Injectable IV Push  polyethylene glycol 3350 Oral  saline laxative (FLEET) Rectal Enema Rectal  saline laxative (FLEET) Rectal Enema Rectal PRN      dextrose 5%. IV Continuous      chlorhexidine 0.12% Liquid Oral Mucosa  chlorhexidine 2% Cloths Topical                            9.5    7.92  )-----------( 281      ( 04 Jun 2020 05:34 )             31.8       06-04    146<H>  |  112<H>  |  21  ----------------------------<  189<H>  3.7   |  27  |  0.73    Ca    8.5      04 Jun 2020 05:34  Phos  2.6     06-04  Mg     2.5     06-04    TPro  6.8  /  Alb  2.6<L>  /  TBili  0.4  /  DBili  x   /  AST  13<L>  /  ALT  18  /  AlkPhos  84  06-03    Lactate 1.1           06-04 @ 05:34                    CHRONIC FRANKLIN      GLOBAL ISSUE/BEST PRACTICE:  Analgesia: N  Sedation: N  CAM-ICU: YULIYA  HOB elevation: Y  Stress ulcer prophylaxis: Y  VTE prophylaxis: Y  Glycemic control: Y  Nutrition: Y    CODE STATUS: FULL Patient is a 74y old  Female who presents with a chief complaint of RESPIRATORY DISTRESS (03 Jun 2020 14:15)    24 hour events: No acute events overnight.    REVIEW OF SYSTEMS  Unable to obtain meaningful ROS secondary to clinical status.    T(F): 98.4 (06-04-20 @ 07:34), Max: 100.7 (06-03-20 @ 17:33)  HR: 80 (06-04-20 @ 07:00) (69 - 92)  BP: 114/66 (06-04-20 @ 07:00) (105/71 - 175/72)  RR: 18 (06-04-20 @ 07:00) (14 - 25)  SpO2: 97% (06-04-20 @ 07:00) (95% - 100%)    Mode: AC/ CMV (Assist Control/ Continuous Mandatory Ventilation), RR (machine): 14, TV (machine): 420, FiO2: 30, PEEP: 5        I&O's Summary    06-03 @ 07:01  -  06-04 @ 07:00  --------------------------------------------------------  IN: 1650 mL / OUT: 790 mL / NET: 860 mL      PHYSICAL EXAM  Gen: Chronically critically ill female, trach to vent  Neuro: Waxing and waning alertness, did not open eyes to verbal or tactile stimulation this morning.   HEENT: PERRL, symmetric  Resp: Coarse breath sounds throughout lung fields anteriorly  CVS: RRR, +S1 and S2  Abd: soft, NTND  Ext: UE contracted in flexion at the shoulders, elbows, and hands b/l; b/l contracted distal LEs  Skin: WWP     MEDICATIONS      dextrose 40% Gel Oral PRN  dextrose 50% Injectable IV Push  dextrose 50% Injectable IV Push  dextrose 50% Injectable IV Push  glucagon  Injectable IntraMuscular PRN  insulin glargine Injectable (LANTUS) SubCutaneous  insulin lispro (HumaLOG) corrective regimen sliding scale SubCutaneous      fentaNYL   Patch  12 MICROgram(s)/Hr Transdermal      enoxaparin Injectable SubCutaneous    pantoprazole  Injectable IV Push  polyethylene glycol 3350 Oral  saline laxative (FLEET) Rectal Enema Rectal  saline laxative (FLEET) Rectal Enema Rectal PRN      dextrose 5%. IV Continuous      chlorhexidine 0.12% Liquid Oral Mucosa  chlorhexidine 2% Cloths Topical                            9.5    7.92  )-----------( 281      ( 04 Jun 2020 05:34 )             31.8       06-04    146<H>  |  112<H>  |  21  ----------------------------<  189<H>  3.7   |  27  |  0.73    Ca    8.5      04 Jun 2020 05:34  Phos  2.6     06-04  Mg     2.5     06-04    TPro  6.8  /  Alb  2.6<L>  /  TBili  0.4  /  DBili  x   /  AST  13<L>  /  ALT  18  /  AlkPhos  84  06-03    Lactate 1.1           06-04 @ 05:34                    CHRONIC REID      GLOBAL ISSUE/BEST PRACTICE:  Analgesia: N  Sedation: N  CAM-ICU: YULIYA  HOB elevation: Y  Stress ulcer prophylaxis: Y  VTE prophylaxis: Y  Glycemic control: Y  Nutrition: Y    CODE STATUS: FULL

## 2020-06-04 NOTE — PROGRESS NOTE ADULT - ASSESSMENT
73 y/o woman with PMHx of frontotemporal dementia, chronic vent dependent respiratory failure, bedbound at baseline who presents with respiratory distress, diaphoresis, and generalized spasm of extremities.  She was found to be febrile, hypertensive, tachycardic, and tachypneic on admission with severe lactic acidosis.  Concern for seizure activity with elevated lactate, initially downtrended with uptrend due to possible repeat seizure activity. Afebrile since yesterday afternoon, but otherwise resolved signs of infection and tolerating chronic vent settings.     Neuro:  neuro follow up , no acute seizure activity   no antiepileptic meds needed    Resp :  cont vent support, spontaneous breathing trial   cont bronchdilators prn ,   aggressive chest pt, suction     ID : Pt off abx , monitor wbc, no need for ABX at present.   ID follow up , pt mostly likely colonized with pseudomonas ,   Am cbc pending     GI : cont bowel prep , tolerating tube feeds, pt is meeting caloric needs at present   pt with chronic constipation . aggressive bowel regime.      GI / DVT prophylaxis.   keep K>4, mag >2.0   H/H stable .   creatine stable     TIME SPENT Over 32 minutes aggregate care time spent on encounter; activities included   direct pt care, counseling and/or coordinating care reviewing notes, lab data/ imaging , discussion with multidisciplinary team/ pt /family. Risks, benefits, alternatives  discussed in detail.

## 2020-06-04 NOTE — PROGRESS NOTE ADULT - ASSESSMENT
74 Female transferred from  [facility] center to [place] ED with report of sepsis, pneumonia, RYAN, patient on chronic trach collar , recent admission to [facility]  5/4/20 to 5/18/20 for hypoxia, pneumonia, COVID positive 04/27/20 ,developed hypoxia and was placed on respiratory support ,required ICU admission with shock and was placed on pressors  .Treated for UTI and pseudomonal pneumonia ,s/p cefepime ,had elevation of D-dimers .CTT was negative for PE , Doppler neg for DVT .Seen by neurologist for sz -like activity ,neurologist impression was that it was related to hypoxia CT Brain was negative for CVA .COVID  tested negative on 05/11 and 05/12  Patient had been sent to ER for respiratory distress ,associated with hypotension tachycardia ,tachypnea and diaphoresis  .Patient has a hx significant for Advanced dementia  ,Aphasic stroke  ,Constipation  ,COVID-19 virus detected  ,COPD ,Anxiety ,Dysphagia ,hyperkalemia ,pneumonia ,pulmonary edema , CVA (cerebral vascular accident)  ,Dementia of frontal lobe type  ,Diabetes mellitus  ,DM (diabetes mellitus)  ,GERD (gastroesophageal reflux disease)  ,HTN (hypertension)  ,Hypertension  ,Pneumonia  ,Quadriplegia  ,Respiratory failure  ,Feeding by G-tube  ,Gastrostomy in place  ,Hx of appendectomy  ,Tracheostomy in place  ,Tracheostomy tube present ,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 Admitted to ICU ,seen by pulm cons Dr Sandoval on admission to [place] ED ( transferred from [facility])and was admitted to ICU  for septic workup and evaluation ,send blood and urine cx, serial lactate levels ,monitor vitals closely hydration ,monitor urine output and renal profile ,iv abx initiated Palliative care consult requested ,to discuss advance directives and complete MOLST Patient was diagnosed with sepsis 2/2 to pneumonia and  called ER respiratory and  ICU ,patient  already received Cefepime 2gm iv, vancomycin  1gm iv, rectal Tylenol 650mg, LR x 1 liter, NS x 1 liter in [place] ED .Further plan of care as per pulmonologist Dr Sandoval and icu team

## 2020-06-04 NOTE — PROGRESS NOTE ADULT - ASSESSMENT
73 y/o woman with PMHx of frontotemporal dementia, chronic vent dependent respiratory failure, bedbound at baseline who presents with respiratory distress, diaphoresis, and generalized spasm of extremities.  She was found to be febrile, hypertensive, tachycardic, and tachypneic on admission with severe lactic acidosis.  Concern for seizure activity with elevated lactate, initially downtrended with uptrend due to possible repeat seizure activity. Afebrile since yesterday afternoon, but otherwise resolved signs of infection and tolerating chronic vent settings.     Neuro: Concern for seizure activity on admission in light of severe lactic acidosis and elevated prolactin, no evidence currently. Extensive neurological w/u in past, including 3-day EEG, without evidence of seizure activity. No need for antiepileptic medications as per neuro. Abnormal stiffening likely 2/2 neurodegenerative process.   CV: Hemodynamically stable.   Pulm: Chronic vent-dependent respiratory failure, not a candidate for weaning trials. Continue full support to keep SpO2 >90%, settings: AC/14/420/30/5 currently.  Renal: Normal renal function. Monitor and supplement lytes PRN. Hypernatremia improved, continue free water 300cc Q6H and monitor BMP.  GI: Continue TFs 2/2 dysphagia. Continue bowel regimen for chronic constipation with miralax, fleet enema's MWF, and PRN fleet enemas at request of patient's family. Protonix for GI ppx.  ID: Trach aspirate cx from 5/29 with few pseudomonas, likely colonizer rather than true infection. UCx and BCx negative, MRSA negative, and COVID negative. COVID Ab positive. Continue to monitor off antibiotics.  Endo: FSGs suboptimal, on moderate dose SS Q6H, improved FSGs with increase in QHS Lantus to 20U.  : Chronic vaughn catheter in place, continue for urinary retention.  Heme: Lovenox 40mg SQ daily for DVT ppx.  Dispo: Stable for d/c back to SNF. COVID PCR negative 6/2. COVID Ab positive.     .

## 2020-06-04 NOTE — PROGRESS NOTE ADULT - SUBJECTIVE AND OBJECTIVE BOX
74y  Female  codeine (Hives)    Patient is a 74y old  Female who presents with a chief complaint of RESPIRATORY DISTRESS (04 Jun 2020 11:45)    HPI:  74 Female transferred from  [facility] center to [place] ED with report of sepsis, pneumonia, RYAN, patient on chronic trach collar , recent admission to [facility]  5/4/20 to 5/18/20 for hypoxia, pneumonia, COVID positive 04/27/20 ,developed hypoxia and was placed on respiratory support ,required ICU admission with shock and was placed on pressors  .Treated for UTI and pseudomonal pneumonia ,s/p cefepime ,had elevation of D-dimers .CTT was negative for PE , Doppler neg for DVT .Seen by neurologist for sz -like activity ,neurologist impression was that it was related to hypoxia CT Brain was negative for CVA .COVID  tested negative on 05/11 and 05/12  Patient had been sent to ER for respiratory distress ,associated with hypotension tachycardia ,tachypnea and diaphoresis  .Patient has a hx significant for Advanced dementia  ,Aphasic stroke  ,Constipation  ,COVID-19 virus detected  ,COPD ,Anxiety ,Dysphagia ,hyperkalemia ,pneumonia ,pulmonary edema , CVA (cerebral vascular accident)  ,Dementia of frontal lobe type  ,Diabetes mellitus  ,DM (diabetes mellitus)  ,GERD (gastroesophageal reflux disease)  ,HTN (hypertension)  ,Hypertension  ,Pneumonia  ,Quadriplegia  ,Respiratory failure  ,Feeding by G-tube  ,Gastrostomy in place  ,Hx of appendectomy  ,Tracheostomy in place  ,Tracheostomy tube present, 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 Admitted to ICU ,seen by pulm cons Dr Sandoval on admission to [place] ED ( transferred from [facility])and was admitted to ICU  for septic workup and evaluation ,send blood and urine cx, serial lactate levels ,monitor vitals closely hydration ,monitor urine output and renal profile ,iv abx initiated Palliative care consult requested ,to discuss advance directives and complete MOLST .Patient was diagnosed with sepsis 2/2 to pneumonia and  called ER respiratory and  ICU ,patient  already received Cefepime 2gm iv, vancomycin  1gm iv, rectal Tylenol 650mg, LR x 1 liter, NS x 1 liter in [place] ED .Further plan of care as per pulmonologist Dr Sandoval and icu team (28 May 2020 23:07)    PAST MEDICAL & SURGICAL HISTORY:  Pneumonia  Quadriplegia  COVID-19 virus detected  Advanced dementia  DM (diabetes mellitus)  HTN (hypertension)  CVA (cerebral vascular accident)  Respiratory failure  Constipation  GERD (gastroesophageal reflux disease)  Hypertension  Respiratory failure  Diabetes mellitus  Aphasic stroke  Dementia of frontal lobe type  Feeding by G-tube  Tracheostomy tube present  Tracheostomy in place  Gastrostomy in place  Hx of appendectomy    FAMILY HISTORY:  No pertinent family history in first degree relatives      Vitals   Vital Signs Last 24 Hrs  T(C): 36.8 (04 Jun 2020 21:00), Max: 37.6 (04 Jun 2020 15:41)  T(F): 98.2 (04 Jun 2020 21:00), Max: 99.7 (04 Jun 2020 15:41)  HR: 70 (04 Jun 2020 21:00) (65 - 86)  BP: 122/59 (04 Jun 2020 21:00) (105/71 - 175/72)  BP(mean): 85 (04 Jun 2020 21:00) (81 - 104)  RR: 14 (04 Jun 2020 21:00) (14 - 25)  SpO2: 99% (04 Jun 2020 21:00) (88% - 100%)    I&O's Summary    03 Jun 2020 07:01  -  04 Jun 2020 07:00  --------------------------------------------------------  IN: 1650 mL / OUT: 790 mL / NET: 860 mL    04 Jun 2020 07:01  -  04 Jun 2020 21:39  --------------------------------------------------------  IN: 1200 mL / OUT: 500 mL / NET: 700 mL        LABS  06-04    146<H>  |  112<H>  |  21  ----------------------------<  189<H>  3.7   |  27  |  0.73    Ca    8.5      04 Jun 2020 05:34  Phos  2.6     06-04  Mg     2.5     06-04    TPro  6.8  /  Alb  2.6<L>  /  TBili  0.4  /  DBili  x   /  AST  13<L>  /  ALT  18  /  AlkPhos  84  06-03                          9.5    7.92  )-----------( 281      ( 04 Jun 2020 05:34 )             31.8           LIVER FUNCTIONS - ( 03 Jun 2020 06:25 )  Alb: 2.6 g/dL / Pro: 6.8 g/dL / ALK PHOS: 84 U/L / ALT: 18 U/L / AST: 13 U/L / GGT: x           CAPILLARY BLOOD GLUCOSE      POCT Blood Glucose.: 222 mg/dL (04 Jun 2020 17:30)        VENT SETTINGS   Mode: AC/ CMV (Assist Control/ Continuous Mandatory Ventilation)  RR (machine): 14  TV (machine): 420  FiO2: 30  PEEP: 5  ITime: 1  MAP: 10  PIP: 26      Meds  MEDICATIONS  (STANDING):  chlorhexidine 0.12% Liquid 15 milliLiter(s) Oral Mucosa every 12 hours  chlorhexidine 2% Cloths 1 Application(s) Topical daily  dextrose 5%. 1000 milliLiter(s) (50 mL/Hr) IV Continuous <Continuous>  dextrose 50% Injectable 12.5 Gram(s) IV Push once  dextrose 50% Injectable 25 Gram(s) IV Push once  dextrose 50% Injectable 25 Gram(s) IV Push once  enoxaparin Injectable 40 milliGRAM(s) SubCutaneous daily  fentaNYL   Patch  12 MICROgram(s)/Hr 1 Patch Transdermal every 72 hours  insulin glargine Injectable (LANTUS) 20 Unit(s) SubCutaneous at bedtime  insulin lispro (HumaLOG) corrective regimen sliding scale   SubCutaneous every 6 hours  pantoprazole  Injectable 40 milliGRAM(s) IV Push daily  saline laxative (FLEET) Rectal Enema 1 Enema Rectal <User Schedule>      REVIEW OF SYSTEMS:   Unable to obtain meaningful ROS secondary to clinical status.      PHYSICAL EXAM  Gen: Chronically critically ill female, trach to vent  Neuro: Waxing and waning alertness, did not open eyes to verbal or tactile stimulation this morning.   HEENT: PERRL, symmetric  Resp: Coarse breath sounds throughout lung fields anteriorly  CVS: RRR, +S1 and S2  Abd: soft, NTND  Ext: UE contracted in flexion at the shoulders, elbows, and hands b/l; b/l contracted distal LEs  Skin: WWP

## 2020-06-04 NOTE — PROGRESS NOTE ADULT - ATTENDING COMMENTS
74 Female transferred from  [facility] center to [place] ED with report of sepsis, pneumonia, RYAN, patient on chronic trach collar , recent admission to [facility]  5/4/20 to 5/18/20 for hypoxia, pneumonia, COVID positive 04/27/20 ,developed hypoxia and was placed on respiratory support ,required ICU admission with shock and was placed on pressors  .Treated for UTI and pseudomonal pneumonia ,s/p cefepime ,had elevation of D-dimers .CTT was negative for PE , Doppler neg for DVT .Seen by neurologist for sz -like activity ,neurologist impression was that it was related to hypoxia CT Brain was negative for CVA .COVID  tested negative on 05/11 and 05/12  Patient had been sent to ER for respiratory distress ,associated with hypotension tachycardia ,tachypnea and diaphoresis  .Patient has a hx significant for Advanced dementia  ,Aphasic stroke  ,Constipation  ,COVID-19 virus detected  ,COPD ,Anxiety ,Dysphagia ,hyperkalemia ,pneumonia ,pulmonary edema , CVA (cerebral vascular accident)  ,Dementia of frontal lobe type  ,Diabetes mellitus  ,DM (diabetes mellitus)  ,GERD (gastroesophageal reflux disease)  ,HTN (hypertension)  ,Hypertension  ,Pneumonia  ,Quadriplegia  ,Respiratory failure  ,Feeding by G-tube  ,Gastrostomy in place  ,Hx of appendectomy  ,Tracheostomy in place  ,Tracheostomy tube present ,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 Admitted to ICU ,seen by pulm cons Dr Rojelio Sandoval on admission to [place] ED ( transferred from [facility])and was admitted to ICU  for septic workup and evaluation ,send blood and urine cx, serial lactate levels ,monitor vitals closely hydration ,monitor urine output and renal profile ,iv abx initiated . Palliative care consult requested ,to discuss advance directives and complete MOLST .Patient was diagnosed with sepsis 2/2 to pneumonia and  called ER respiratory and  ICU ,patient  already received Cefepime 2gm iv, vancomycin  1gm iv, rectal Tylenol 650mg, LR x 1 liter, NS x 1 liter in [place] ED . Treated for sepsis (resolved )2/2 to probable  pneumonia ,poa -was on vanco and zosyn ,seen by ID CONSULT-  watch off abx ,  pseudomonas in sputum likely colonized - monitor off Abx; repeated  Covid negative [facility] ,as per social service  is not in agreement with plan and would like to discuss other placement options and appealing discharge .

## 2020-06-04 NOTE — PROGRESS NOTE ADULT - ATTENDING COMMENTS
74F h/o frontotemporal dementia, chronic vent dependent respiratory failure, bedbound presenting with respiratory distress, diaphoresis and generalized spasm of extremities concerning for seizure activity vs sepsis without recurrent episodes in ICU.     Neuro: no acute issues; continue fentanyl patch for pain  CV: hemodynamically stable  Pulm: apnic during SBT - continue full vent support for chronic resp failure  GI: continue enteral feeds via PEG; hold miralax given loose stools x3 this AM  Renal: chronic vaughn; hypernatremia resolved  ID: pseudomonas in sputum likely colonized - monitor off Abx; repeat Covid negative x2  Endo: FS better controlled, continue Lantus 20units and ISS coverage with q6hr FS    Remains stable for discharge back to SNF.

## 2020-06-04 NOTE — PROGRESS NOTE ADULT - SUBJECTIVE AND OBJECTIVE BOX
BREA BECKHAM    [facility] ICU1 02    Patient is a 74y old  Female who presents with a chief complaint of RESPIRATORY DISTRESS (04 Jun 2020 10:22)       Allergies    codeine (Hives)    Intolerances        HPI:  74 Female transferred from  [facility] center to [place] ED with report of sepsis, pneumonia, RYAN, patient on chronic trach collar , recent admission to [facility]  5/4/20 to 5/18/20 for hypoxia, pneumonia, COVID positive 04/27/20 ,developed hypoxia and was placed on respiratory support ,required ICU admission with shock and was placed on pressors  .Treated for UTI and pseudomonal pneumonia ,s/p cefepime ,had elevation of D-dimers .CTT was negative for PE , Doppler neg for DVT .Seen by neurologist for sz -like activity ,neurologist impression was that it was related to hypoxia CT Brain was negative for CVA .COVID  tested negative on 05/11 and 05/12  Patient had been sent to ER for respiratory distress ,associated with hypotension tachycardia ,tachypnea and diaphoresis  .Patient has a hx significant for Advanced dementia  ,Aphasic stroke  ,Constipation  ,COVID-19 virus detected  ,COPD ,Anxiety ,Dysphagia ,hyperkalemia ,pneumonia ,pulmonary edema , CVA (cerebral vascular accident)  ,Dementia of frontal lobe type  ,Diabetes mellitus  ,DM (diabetes mellitus)  ,GERD (gastroesophageal reflux disease)  ,HTN (hypertension)  ,Hypertension  ,Pneumonia  ,Quadriplegia  ,Respiratory failure  ,Feeding by G-tube  ,Gastrostomy in place  ,Hx of appendectomy  ,Tracheostomy in place  ,Tracheostomy tube present, 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 Admitted to ICU ,seen by pulm cons Dr Sandoval on admission to [place] ED ( transferred from [facility])and was admitted to ICU  for septic workup and evaluation ,send blood and urine cx, serial lactate levels ,monitor vitals closely hydration ,monitor urine output and renal profile ,iv abx initiated Palliative care consult requested ,to discuss advance directives and complete MOLST .Patient was diagnosed with sepsis 2/2 to pneumonia and  called ER respiratory and  ICU ,patient  already received Cefepime 2gm iv, vancomycin  1gm iv, rectal Tylenol 650mg, LR x 1 liter, NS x 1 liter in [place] ED .Further plan of care as per pulmonologist Dr Sandoval and icu team (28 May 2020 23:07)      PAST MEDICAL & SURGICAL HISTORY:  Pneumonia  Quadriplegia  COVID-19 virus detected  Advanced dementia  DM (diabetes mellitus)  HTN (hypertension)  CVA (cerebral vascular accident)  Respiratory failure  Constipation  GERD (gastroesophageal reflux disease)  Hypertension  Respiratory failure  Diabetes mellitus  Aphasic stroke  Dementia of frontal lobe type  Feeding by G-tube  Tracheostomy tube present  Tracheostomy in place  Gastrostomy in place  Hx of appendectomy      FAMILY HISTORY:  No pertinent family history in first degree relatives        MEDICATIONS   chlorhexidine 0.12% Liquid 15 milliLiter(s) Oral Mucosa every 12 hours  chlorhexidine 2% Cloths 1 Application(s) Topical daily  dextrose 40% Gel 15 Gram(s) Oral once PRN  dextrose 5%. 1000 milliLiter(s) IV Continuous <Continuous>  dextrose 50% Injectable 12.5 Gram(s) IV Push once  dextrose 50% Injectable 25 Gram(s) IV Push once  dextrose 50% Injectable 25 Gram(s) IV Push once  enoxaparin Injectable 40 milliGRAM(s) SubCutaneous daily  fentaNYL   Patch  12 MICROgram(s)/Hr 1 Patch Transdermal every 72 hours  glucagon  Injectable 1 milliGRAM(s) IntraMuscular once PRN  insulin glargine Injectable (LANTUS) 20 Unit(s) SubCutaneous at bedtime  insulin lispro (HumaLOG) corrective regimen sliding scale   SubCutaneous every 6 hours  pantoprazole  Injectable 40 milliGRAM(s) IV Push daily  saline laxative (FLEET) Rectal Enema 1 Enema Rectal <User Schedule>  saline laxative (FLEET) Rectal Enema 1 Enema Rectal daily PRN      Vital Signs Last 24 Hrs  T(C): 36.9 (04 Jun 2020 07:34), Max: 38.2 (03 Jun 2020 17:33)  T(F): 98.4 (04 Jun 2020 07:34), Max: 100.7 (03 Jun 2020 17:33)  HR: 79 (04 Jun 2020 11:00) (69 - 92)  BP: 159/73 (04 Jun 2020 11:00) (105/71 - 175/72)  BP(mean): 97 (04 Jun 2020 10:00) (81 - 104)  RR: 20 (04 Jun 2020 11:00) (14 - 25)  SpO2: 100% (04 Jun 2020 11:00) (95% - 100%)      06-03-20 @ 07:01  -  06-04-20 @ 07:00  --------------------------------------------------------  IN: 1650 mL / OUT: 790 mL / NET: 860 mL    06-04-20 @ 07:01  -  06-04-20 @ 11:45  --------------------------------------------------------  IN: 200 mL / OUT: 0 mL / NET: 200 mL        Mode: AC/ CMV (Assist Control/ Continuous Mandatory Ventilation), RR (machine): 14, TV (machine): 420, FiO2: 30, PEEP: 5, ITime: 1, MAP: 9.6, PIP: 25    LABS:                        9.5    7.92  )-----------( 281      ( 04 Jun 2020 05:34 )             31.8     06-04    146<H>  |  112<H>  |  21  ----------------------------<  189<H>  3.7   |  27  |  0.73    Ca    8.5      04 Jun 2020 05:34  Phos  2.6     06-04  Mg     2.5     06-04    TPro  6.8  /  Alb  2.6<L>  /  TBili  0.4  /  DBili  x   /  AST  13<L>  /  ALT  18  /  AlkPhos  84  06-03              WBC:  WBC Count: 7.92 K/uL (06-04 @ 05:34)  WBC Count: 7.52 K/uL (06-03 @ 06:25)  WBC Count: 6.33 K/uL (06-02 @ 05:50)  WBC Count: 8.70 K/uL (06-01 @ 05:43)      MICROBIOLOGY:  RECENT CULTURES:  05-29 .Sputum Sputum Pseudomonas aeruginosa   Few polymorphonuclear leukocytes per low power field  Few Squamous epithelial cells per low power field  Few Gram Negative Rods per oil power field   Few Pseudomonas aeruginosa  Normal Respiratory Elvira present    05-29 .Urine Catheterized XXXX XXXX   <10,000 CFU/mL Normal Urogenital Elvira    05-29 .Blood Blood-Peripheral XXXX XXXX   No Growth Final                    Sodium:  Sodium, Serum: 146 mmol/L (06-04 @ 05:34)  Sodium, Serum: 145 mmol/L (06-03 @ 06:25)  Sodium, Serum: 144 mmol/L (06-02 @ 16:33)  Sodium, Serum: 151 mmol/L (06-02 @ 05:50)  Sodium, Serum: 144 mmol/L (06-01 @ 05:43)      0.73 mg/dL 06-04 @ 05:34  0.75 mg/dL 06-03 @ 06:25  0.78 mg/dL 06-02 @ 16:33  0.82 mg/dL 06-02 @ 05:50  0.88 mg/dL 06-01 @ 05:43      Hemoglobin:  Hemoglobin: 9.5 g/dL (06-04 @ 05:34)  Hemoglobin: 10.0 g/dL (06-03 @ 06:25)  Hemoglobin: 9.5 g/dL (06-02 @ 05:50)  Hemoglobin: 9.8 g/dL (06-01 @ 05:43)      Platelets: Platelet Count - Automated: 281 K/uL (06-04 @ 05:34)  Platelet Count - Automated: 283 K/uL (06-03 @ 06:25)  Platelet Count - Automated: 269 K/uL (06-02 @ 05:50)  Platelet Count - Automated: 254 K/uL (06-01 @ 05:43)      LIVER FUNCTIONS - ( 03 Jun 2020 06:25 )  Alb: 2.6 g/dL / Pro: 6.8 g/dL / ALK PHOS: 84 U/L / ALT: 18 U/L / AST: 13 U/L / GGT: x                 RADIOLOGY & ADDITIONAL STUDIES:

## 2020-06-04 NOTE — PROGRESS NOTE ADULT - SUBJECTIVE AND OBJECTIVE BOX
Date/Time Patient Seen:  		  Referring MD:   Data Reviewed	       Patient is a 74y old  Female who presents with a chief complaint of RESPIRATORY DISTRESS (04 Jun 2020 08:04)      Subjective/HPI     PAST MEDICAL & SURGICAL HISTORY:  Pneumonia  Quadriplegia  COVID-19 virus detected  Advanced dementia  DM (diabetes mellitus)  HTN (hypertension)  CVA (cerebral vascular accident)  Respiratory failure  Constipation  GERD (gastroesophageal reflux disease)  Hypertension  Respiratory failure  Diabetes mellitus  Aphasic stroke  Dementia of frontal lobe type  Feeding by G-tube  Tracheostomy tube present  Tracheostomy in place  Gastrostomy in place  Hx of appendectomy        Medication list         MEDICATIONS  (STANDING):  chlorhexidine 0.12% Liquid 15 milliLiter(s) Oral Mucosa every 12 hours  chlorhexidine 2% Cloths 1 Application(s) Topical daily  dextrose 5%. 1000 milliLiter(s) (50 mL/Hr) IV Continuous <Continuous>  dextrose 50% Injectable 12.5 Gram(s) IV Push once  dextrose 50% Injectable 25 Gram(s) IV Push once  dextrose 50% Injectable 25 Gram(s) IV Push once  enoxaparin Injectable 40 milliGRAM(s) SubCutaneous daily  fentaNYL   Patch  12 MICROgram(s)/Hr 1 Patch Transdermal every 72 hours  insulin glargine Injectable (LANTUS) 20 Unit(s) SubCutaneous at bedtime  insulin lispro (HumaLOG) corrective regimen sliding scale   SubCutaneous every 6 hours  pantoprazole  Injectable 40 milliGRAM(s) IV Push daily  polyethylene glycol 3350 17 Gram(s) Oral daily  saline laxative (FLEET) Rectal Enema 1 Enema Rectal <User Schedule>    MEDICATIONS  (PRN):  dextrose 40% Gel 15 Gram(s) Oral once PRN Blood Glucose LESS THAN 70 milliGRAM(s)/deciliter  glucagon  Injectable 1 milliGRAM(s) IntraMuscular once PRN Glucose LESS THAN 70 milligrams/deciliter  saline laxative (FLEET) Rectal Enema 1 Enema Rectal daily PRN constipation, to assist in bowel movement         Vitals log        ICU Vital Signs Last 24 Hrs  T(C): 36.9 (04 Jun 2020 07:34), Max: 38.2 (03 Jun 2020 17:33)  T(F): 98.4 (04 Jun 2020 07:34), Max: 100.7 (03 Jun 2020 17:33)  HR: 82 (04 Jun 2020 08:02) (69 - 92)  BP: 136/63 (04 Jun 2020 08:00) (105/71 - 175/72)  BP(mean): 91 (04 Jun 2020 08:00) (81 - 104)  ABP: --  ABP(mean): --  RR: 14 (04 Jun 2020 08:00) (14 - 25)  SpO2: 99% (04 Jun 2020 08:02) (95% - 100%)       Mode: AC/ CMV (Assist Control/ Continuous Mandatory Ventilation)  RR (machine): 14  TV (machine): 420  FiO2: 30  PEEP: 5  ITime: 1  MAP: 9.6  PIP: 25      Input and Output:  I&O's Detail    03 Jun 2020 07:01  -  04 Jun 2020 07:00  --------------------------------------------------------  IN:    Enteral Tube Flush: 600 mL    Glucerna 1.5: 1050 mL  Total IN: 1650 mL    OUT:    Indwelling Catheter - Urethral: 790 mL  Total OUT: 790 mL    Total NET: 860 mL          Lab Data                        9.5    7.92  )-----------( 281      ( 04 Jun 2020 05:34 )             31.8     06-04    146<H>  |  112<H>  |  21  ----------------------------<  189<H>  3.7   |  27  |  0.73    Ca    8.5      04 Jun 2020 05:34  Phos  2.6     06-04  Mg     2.5     06-04    TPro  6.8  /  Alb  2.6<L>  /  TBili  0.4  /  DBili  x   /  AST  13<L>  /  ALT  18  /  AlkPhos  84  06-03            Review of Systems	      Objective     Physical Examination    heart s1s2  lung dec BS  abd soft      Pertinent Lab findings & Imaging      Annalise:  NO   Adequate UO     I&O's Detail    03 Jun 2020 07:01  -  04 Jun 2020 07:00  --------------------------------------------------------  IN:    Enteral Tube Flush: 600 mL    Glucerna 1.5: 1050 mL  Total IN: 1650 mL    OUT:    Indwelling Catheter - Urethral: 790 mL  Total OUT: 790 mL    Total NET: 860 mL               Discussed with:     Cultures:	        Radiology

## 2020-06-04 NOTE — PROGRESS NOTE ADULT - ASSESSMENT
cont rx cont rx    PARASHARAMI BREA  1945 DOA 2020 DR ILIANA KEMP     REVIEW OF SYMPTOMS      Able to give ROS  NO     PHYSICAL EXAM    HEENT Unremarkable PERRLA atraumatic   RESP Fair air entry EXP prolonged    Harsh breath sound Resp distres mild   CARDIAC S1 S2 No S3     NO JVD    ABDOMEN SOFT BS PRESENT NOT DISTENDED No hepatosplenomegaly PEDAL EDEMA present No calf tenderness  NO rash   GENERAL Not TOXIC looking    OXYGENATION        VITALS/LABS   2020 69 150/50   2020 ac 14/420/.3     PT DATA/BEST PRACTICE  ALLERGY         codeine             WT     63 (2020)                                 BMI     23 (2020)                        CrCl                                  ADVANCED DIRECTIVE     HEAD OF BED ELEVATION Yes      INFECTION PPLX     Chlorhexidine 2% ()   Chlorhexidine .12% ()   DVT PROPHYLAXIS   hpsc ()  --> lvnx 40 ()               SQUIRES PROPHYLAXIS    Protonix 40 ()       DYSPHAGIA EVAL     DIET     npo () --> TF Flucerna 1.5 1100 (529)    FREE WATER 300.4 ()                                                                  IV F    (-)   ABIO   zosyn (-)   Vanco 1.2 (-)     COVID PCR  N-vinnie   COVID PCR  N-vinnie     W ----2020 W 21-8.9 -7-8.7-7.9   PROC 2020 PC 2.9  CXR  BL infiltr  MICROBIO    MRSA N-VINNIE    Sp Few GNR Pseudomonas    Urine C N-VINNIE  Blod C N-VINNIE     LA --2020 LA 10.5-2.6 - 7.9     D-DIMR 2020 D-dimr 668  DUPLX 2020 V Duplex N-vinnie     Hb --- Hb 11-9.5-9.2-9.8     Na 6/-2020 Na 151 - 146   K -2020 K 6-4.6   Cr 5/10- --2020 Cr 1-1.3-.7 -.8    AMS  CT   CT H N-vinnie     ABG   2020 1a ac 14/420/35%/5 730/51/117                               PATIENT SUMMARY     74 f PMH aphasic stroke functional quad dementia GERD trach vent dependent recently hospitalisd [facility] was COVID P+vinnie 2020 was admitted 2020 for dyspnea possible sepsis Pulm consulted     home meds psyllium feso4 insulin fentanyl 12 glarigine 20 lvnx 40                                          PAST ADMISSION -2020 [facility]     PATIENT DATA/ASSESSMENT/RECOMMENDATIONS     RESP DISTRESS POA   Possibly  sec to Seizures vs Ventilator associated pneumonia (WBC Infiltrates)  Improved    MECHANICAL VENT   Monitor abg Target po 90-95% Vent protocol Trach care   Infection ppx  Gas exchange   showed resp acidosis     INFECTION  COVID  PCR N-VINNIE    FEVER POSSIBLE PNEUMONIA POA 2020   HO recent hospital stay   On vent Leukocytosis poa   Vanco zosyn  DCed 2020 as cultures N-VINNIE   sputum c showed Pseudomonas This is likely colonization   Dr Olivas on case      LACTICEMIA POA   Cause of LA may have been unwitnessed sz or sepsis   IV fluids and monitor serially       ELEVATED D-DIMR POA   ELEVATED PTT POA   Was on lovenox pplx at [facility]  2020 Suggest Check  V duplex  W  2020 Check  cta ch     HYPERNATREMIA NOTED 2020   started on fw 2020     RYAN POA   IV fluids   Improved    ANEMIA   Hb stable     DM   sl scale     RO SEIZURE   CT H N-vinnie     PLAN   Infection felt to be unlikely and abio dced   FW started 2020 for elevated Na     TIME SPENT Over 25 minutes aggregate care time spent on encounter; activities included   direct pt care, counseling and/or coordinating care reviewing notes, lab data/ imaging , discussion with multidisciplinary team/ pt /family. Risks, benefits, alternatives  discussed in detail.    CHAPINCITO GUIDRY  1945 DOA 2020 DR ILIANA KEMP

## 2020-06-04 NOTE — CHART NOTE - NSCHARTNOTEFT_GEN_A_CORE
Assessment:   Pt seen as ICU follow-up. Per chart, remains stable for d/c to SNF. Pt's  appealed discharge yesterday and waiting for decision. Discussed with social work. Sepsis resolved, COVID negative x 2. Chronic vent dependent on full vent support. Pt continues to tolerate TF via GJ of Glucerna 1.5. Upon visit, TF observed titrating at 50 ml/hr x 22 hr. Provides a total of 1500 kcal, 82.5 g pro (24 kcal/kg, 1.3 g/kg pro based on current body weight 62.4 kg). Free water flushes 300 ml q6h for hypernatremia (6/4 Na 146, however noted to be resolving). Holding Miralax given loose stools x3 this AM per EMR. Full code.     Factors impacting intake: [ ] none [ ] nausea  [ ] vomiting [ ] diarrhea [ ] constipation  [ ]chewing problems [ ] swallowing issues  [ x ] other: NPO with TF    Diet Presciption: Diet, NPO:   Tube Feeding Modality: Gastro-Jejunostomy  Glucerna 1.5  Total Volume for 24 Hours (mL): 1100  Continuous  Starting Tube Feed Rate {mL per Hour}: 50  Until Goal Tube Feed Rate (mL per Hour): 50  Tube Feed Duration (in Hours): 22  Tube Feed Start Time: 13:00 (05-29-20 @ 12:23)    Current Weight: (5/29) 62.4 kg    Pertinent Medications: MEDICATIONS  (STANDING):  chlorhexidine 0.12% Liquid 15 milliLiter(s) Oral Mucosa every 12 hours  chlorhexidine 2% Cloths 1 Application(s) Topical daily  dextrose 5%. 1000 milliLiter(s) (50 mL/Hr) IV Continuous <Continuous>  dextrose 50% Injectable 12.5 Gram(s) IV Push once  dextrose 50% Injectable 25 Gram(s) IV Push once  dextrose 50% Injectable 25 Gram(s) IV Push once  enoxaparin Injectable 40 milliGRAM(s) SubCutaneous daily  fentaNYL   Patch  12 MICROgram(s)/Hr 1 Patch Transdermal every 72 hours  insulin glargine Injectable (LANTUS) 20 Unit(s) SubCutaneous at bedtime  insulin lispro (HumaLOG) corrective regimen sliding scale   SubCutaneous every 6 hours  pantoprazole  Injectable 40 milliGRAM(s) IV Push daily  saline laxative (FLEET) Rectal Enema 1 Enema Rectal <User Schedule>    MEDICATIONS  (PRN):  dextrose 40% Gel 15 Gram(s) Oral once PRN Blood Glucose LESS THAN 70 milliGRAM(s)/deciliter  glucagon  Injectable 1 milliGRAM(s) IntraMuscular once PRN Glucose LESS THAN 70 milligrams/deciliter  saline laxative (FLEET) Rectal Enema 1 Enema Rectal daily PRN constipation, to assist in bowel movement    Pertinent Labs: 06-04 Na146 mmol/L<H> Glu 189 mg/dL<H> K+ 3.7 mmol/L Cr  0.73 mg/dL BUN 21 mg/dL 06-04 Phos 2.6 mg/dL 06-03 Alb 2.6 g/dL<L> 06-01 PAB 16 mg/dL<L>     CAPILLARY BLOOD GLUCOSE      POCT Blood Glucose.: 285 mg/dL (04 Jun 2020 11:21)  POCT Blood Glucose.: 187 mg/dL (04 Jun 2020 05:20)  POCT Blood Glucose.: 207 mg/dL (03 Jun 2020 22:50)  POCT Blood Glucose.: 192 mg/dL (03 Jun 2020 18:46)  POCT Blood Glucose.: 231 mg/dL (03 Jun 2020 17:22)    Skin: No edema/pressure injuries documented.    Estimated Needs:   [ x ] no change since previous assessment  [ ] recalculated:     Previous Nutrition Diagnosis:   [ x ] No active nutrition diagnosis    Nutrition Diagnosis is [ ] ongoing  [ ] resolved [ x ] not applicable     New Nutrition Diagnosis: [ x ] not applicable       Interventions:   Recommend  [ ] Change Diet To:  [ ] Nutrition Supplement  [ x ] Nutrition Support: Continue TF Glucerna 1.5 via GJ at goal rate of 50 ml/hr x 22 hr. Provides a total of 1500 kcal, 82.5 g pro (24 kcal/kg, 1.3 g/kg pro based on current body weight 62.4 kg). Free water flushes 300 ml q6h per MD. Monitor tolerance to TF.  [ ] Other:     Monitoring and Evaluation:   [ ] PO intake [ x ] Tolerance to diet prescription [ x ] weights [ x ] labs[ x ] follow up per protocol  [ ] other:

## 2020-06-04 NOTE — PROGRESS NOTE ADULT - SUBJECTIVE AND OBJECTIVE BOX
PROGRESS NOTE  Patient is a 74y old  Female who presents with a chief complaint of RESPIRATORY DISTRESS (04 Jun 2020 07:55)  Chart and available morning labs /imaging are reviewed electronically , urgent issues addressed . More information  is being added upon completion of rounds , when more information is collected and management discussed with consultants , medical staff and social service/case management on the floor     OVERNIGHT      HPI:  74 Female transferred from  [facility] to [facility] ED with report of sepsis, pneumonia, RYAN, patient on chronic trach collar , recent admission to [facility]  5/4/20 to 5/18/20 for hypoxia, pneumonia, COVID positive 04/27/20 ,developed hypoxia and was placed on respiratory support ,required ICU admission with shock and was placed on pressors  .Treated for UTI and pseudomonal pneumonia ,s/p cefepime ,had elevation of D-dimers .CTT was negative for PE , Doppler neg for DVT .Seen by neurologist for sz -like activity ,neurologist impression was that it was related to hypoxia CT Brain was negative for CVA .COVID  tested negative on 05/11 and 05/12  Patient had been sent to ER for respiratory distress ,associated with hypotension tachycardia ,tachypnea and diaphoresis  .Patient has a hx significant for Advanced dementia  ,Aphasic stroke  ,Constipation  ,COVID-19 virus detected  ,COPD ,Anxiety ,Dysphagia ,hyperkalemia ,pneumonia ,pulmonary edema , CVA (cerebral vascular accident)  ,Dementia of frontal lobe type  ,Diabetes mellitus  ,DM (diabetes mellitus)  ,GERD (gastroesophageal reflux disease)  ,HTN (hypertension)  ,Hypertension  ,Pneumonia  ,Quadriplegia  ,Respiratory failure  ,Feeding by G-tube  ,Gastrostomy in place  ,Hx of appendectomy  ,Tracheostomy in place  ,Tracheostomy tube present, 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 Admitted to ICU ,seen by pulm cons Dr Sandoval on admission to [facility] ( transferred from [facility])and was admitted to ICU  for septic workup and evaluation ,send blood and urine cx, serial lactate levels ,monitor vitals closely hydration ,monitor urine output and renal profile ,iv abx initiated Palliative care consult requested ,to discuss advance directives and complete MOLST .Patient was diagnosed with sepsis 2/2 to pneumonia and  called ER respiratory and  ICU ,patient  already received Cefepime 2gm iv, vancomycin  1gm iv, rectal Tylenol 650mg, LR x 1 liter, NS x 1 liter in [facility] ED .Further plan of care as per pulmonologist Dr Sandoval and icu team (28 May 2020 23:07)    PAST MEDICAL & SURGICAL HISTORY:  Pneumonia  Quadriplegia  COVID-19 virus detected  Advanced dementia  DM (diabetes mellitus)  HTN (hypertension)  CVA (cerebral vascular accident)  Respiratory failure  Constipation  GERD (gastroesophageal reflux disease)  Hypertension  Respiratory failure  Diabetes mellitus  Aphasic stroke  Dementia of frontal lobe type  Feeding by G-tube  Tracheostomy tube present  Tracheostomy in place  Gastrostomy in place  Hx of appendectomy      MEDICATIONS  (STANDING):  chlorhexidine 0.12% Liquid 15 milliLiter(s) Oral Mucosa every 12 hours  chlorhexidine 2% Cloths 1 Application(s) Topical daily  dextrose 5%. 1000 milliLiter(s) (50 mL/Hr) IV Continuous <Continuous>  dextrose 50% Injectable 12.5 Gram(s) IV Push once  dextrose 50% Injectable 25 Gram(s) IV Push once  dextrose 50% Injectable 25 Gram(s) IV Push once  enoxaparin Injectable 40 milliGRAM(s) SubCutaneous daily  fentaNYL   Patch  12 MICROgram(s)/Hr 1 Patch Transdermal every 72 hours  insulin glargine Injectable (LANTUS) 20 Unit(s) SubCutaneous at bedtime  insulin lispro (HumaLOG) corrective regimen sliding scale   SubCutaneous every 6 hours  pantoprazole  Injectable 40 milliGRAM(s) IV Push daily  polyethylene glycol 3350 17 Gram(s) Oral daily  saline laxative (FLEET) Rectal Enema 1 Enema Rectal <User Schedule>    MEDICATIONS  (PRN):  dextrose 40% Gel 15 Gram(s) Oral once PRN Blood Glucose LESS THAN 70 milliGRAM(s)/deciliter  glucagon  Injectable 1 milliGRAM(s) IntraMuscular once PRN Glucose LESS THAN 70 milligrams/deciliter  saline laxative (FLEET) Rectal Enema 1 Enema Rectal daily PRN constipation, to assist in bowel movement      OBJECTIVE    T(C): 36.9 (06-04-20 @ 07:34), Max: 38.2 (06-03-20 @ 17:33)  HR: 82 (06-04-20 @ 08:02) (69 - 92)  BP: 114/66 (06-04-20 @ 07:00) (105/71 - 175/72)  RR: 18 (06-04-20 @ 07:00) (14 - 25)  SpO2: 99% (06-04-20 @ 08:02) (95% - 100%)  Wt(kg): --  I&O's Summary    03 Jun 2020 07:01  -  04 Jun 2020 07:00  --------------------------------------------------------  IN: 1650 mL / OUT: 790 mL / NET: 860 mL          REVIEW OF SYSTEMS:  CONSTITUTIONAL: No fever, weight loss, or fatigue  EYES: No eye pain, visual disturbances, or discharge  ENMT:   No sinus or throat pain  NECK: No pain or stiffness  RESPIRATORY: No cough, wheezing, chills or hemoptysis; No shortness of breath  CARDIOVASCULAR: No chest pain, palpitations, dizziness, or leg swelling  GASTROINTESTINAL: No abdominal pain. No nausea, vomiting; No diarrhea or constipation. No melena or hematochezia.  GENITOURINARY: No dysuria, frequency, hematuria, or incontinence  NEUROLOGICAL: No headaches, memory loss, loss of strength, numbness, or tremors  SKIN: No itching, burning, rashes, or lesions   MUSCULOSKELETAL: No joint pain or swelling; No muscle, back, or extremity pain    PHYSICAL EXAM:  Appearance: NAD. VS past 24 hrs -as above   HEENT:   Moist oral mucosa. Conjunctiva clear b/l.   Neck : supple  Respiratory: Lungs CTAB.  Gastrointestinal:  Soft, nontender. No rebound. No rigidity. BS present	  Cardiovascular: RRR ,S1S2 present  Neurologic: Non-focal. Moving all extremities.  Extremities: No edema. No erythema. No calf tenderness.  Skin: No rashes, No ecchymoses, No cyanosis.	  wounds ,skin lesions-See skin assesment flow sheet   LABS:                        9.5    7.92  )-----------( 281      ( 04 Jun 2020 05:34 )             31.8     06-04    146<H>  |  112<H>  |  21  ----------------------------<  189<H>  3.7   |  27  |  0.73    Ca    8.5      04 Jun 2020 05:34  Phos  2.6     06-04  Mg     2.5     06-04    TPro  6.8  /  Alb  2.6<L>  /  TBili  0.4  /  DBili  x   /  AST  13<L>  /  ALT  18  /  AlkPhos  84  06-03    CAPILLARY BLOOD GLUCOSE      POCT Blood Glucose.: 187 mg/dL (04 Jun 2020 05:20)  POCT Blood Glucose.: 207 mg/dL (03 Jun 2020 22:50)  POCT Blood Glucose.: 192 mg/dL (03 Jun 2020 18:46)  POCT Blood Glucose.: 231 mg/dL (03 Jun 2020 17:22)  POCT Blood Glucose.: 268 mg/dL (03 Jun 2020 12:18)          Culture - Sputum (collected 29 May 2020 21:16)  Source: .Sputum Sputum  Gram Stain (29 May 2020 22:19):    Few polymorphonuclear leukocytes per low power field    Few Squamous epithelial cells per low power field    Few Gram Negative Rods per oil power field  Final Report (31 May 2020 16:00):    Few Pseudomonas aeruginosa    Normal Respiratory Elvira present  Organism: Pseudomonas aeruginosa (31 May 2020 16:00)  Organism: Pseudomonas aeruginosa (31 May 2020 16:00)    Culture - Urine (collected 29 May 2020 01:06)  Source: .Urine Catheterized  Final Report (30 May 2020 07:47):    <10,000 CFU/mL Normal Urogenital Elvira    Culture - Blood (collected 29 May 2020 00:35)  Source: .Blood Blood-Peripheral  Final Report (03 Jun 2020 01:00):    No Growth Final    Culture - Blood (collected 29 May 2020 00:35)  Source: .Blood Blood-Peripheral  Final Report (03 Jun 2020 01:00):    No Growth Final      RADIOLOGY & ADDITIONAL TESTS:   reviewed elctronically  ASSESSMENT/PLAN: PROGRESS NOTE  Patient is a 74y old  Female who presents with a chief complaint of RESPIRATORY DISTRESS (04 Jun 2020 07:55)  Chart and available morning labs /imaging are reviewed electronically , urgent issues addressed . More information  is being added upon completion of rounds , when more information is collected and management discussed with consultants , medical staff and social service/case management on the floor   OVERNIGHT  No new issues reported by medical staff . All above noted Patient is resting in a bed comfortably Remains stable for d/c to [facility] .Patient's  appealed discharge yesterday and waiting for decision .Discussed with  covering ICU Abi   HPI:  74 Female transferred from  [facility] center to [facility] with report of sepsis, pneumonia, RYAN, patient on chronic trach collar , recent admission to [facility]  5/4/20 to 5/18/20 for hypoxia, pneumonia, COVID positive 04/27/20 ,developed hypoxia and was placed on respiratory support ,required ICU admission with shock and was placed on pressors  .Treated for UTI and pseudomonal pneumonia ,s/p cefepime ,had elevation of D-dimers .CTT was negative for PE , Doppler neg for DVT .Seen by neurologist for sz -like activity ,neurologist impression was that it was related to hypoxia CT Brain was negative for CVA .COVID  tested negative on 05/11 and 05/12  Patient had been sent to ER for respiratory distress ,associated with hypotension tachycardia ,tachypnea and diaphoresis  .Patient has a hx significant for Advanced dementia  ,Aphasic stroke  ,Constipation  ,COVID-19 virus detected  ,COPD ,Anxiety ,Dysphagia ,hyperkalemia ,pneumonia ,pulmonary edema , CVA (cerebral vascular accident)  ,Dementia of frontal lobe type  ,Diabetes mellitus  ,DM (diabetes mellitus)  ,GERD (gastroesophageal reflux disease)  ,HTN (hypertension)  ,Hypertension  ,Pneumonia  ,Quadriplegia  ,Respiratory failure  ,Feeding by G-tube  ,Gastrostomy in place  ,Hx of appendectomy  ,Tracheostomy in place  ,Tracheostomy tube present, 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 Admitted to ICU ,seen by pulm cons Dr Sandoval on admission to [place] ED ( transferred from [facility])and was admitted to ICU  for septic workup and evaluation ,send blood and urine cx, serial lactate levels ,monitor vitals closely hydration ,monitor urine output and renal profile ,iv abx initiated Palliative care consult requested ,to discuss advance directives and complete MOLST .Patient was diagnosed with sepsis 2/2 to pneumonia and  called ER respiratory and  ICU ,patient  already received Cefepime 2gm iv, vancomycin  1gm iv, rectal Tylenol 650mg, LR x 1 liter, NS x 1 liter in [facility] .Further plan of care as per pulmonologist Dr Sandoval and icu team (28 May 2020 23:07)    PAST MEDICAL & SURGICAL HISTORY:  Pneumonia  Quadriplegia  COVID-19 virus detected  Advanced dementia  DM (diabetes mellitus)  HTN (hypertension)  CVA (cerebral vascular accident)  Respiratory failure  Constipation  GERD (gastroesophageal reflux disease)  Hypertension  Respiratory failure  Diabetes mellitus  Aphasic stroke  Dementia of frontal lobe type  Feeding by G-tube  Tracheostomy tube present  Tracheostomy in place  Gastrostomy in place  Hx of appendectomy      MEDICATIONS  (STANDING):  chlorhexidine 0.12% Liquid 15 milliLiter(s) Oral Mucosa every 12 hours  chlorhexidine 2% Cloths 1 Application(s) Topical daily  dextrose 5%. 1000 milliLiter(s) (50 mL/Hr) IV Continuous <Continuous>  dextrose 50% Injectable 12.5 Gram(s) IV Push once  dextrose 50% Injectable 25 Gram(s) IV Push once  dextrose 50% Injectable 25 Gram(s) IV Push once  enoxaparin Injectable 40 milliGRAM(s) SubCutaneous daily  fentaNYL   Patch  12 MICROgram(s)/Hr 1 Patch Transdermal every 72 hours  insulin glargine Injectable (LANTUS) 20 Unit(s) SubCutaneous at bedtime  insulin lispro (HumaLOG) corrective regimen sliding scale   SubCutaneous every 6 hours  pantoprazole  Injectable 40 milliGRAM(s) IV Push daily  polyethylene glycol 3350 17 Gram(s) Oral daily  saline laxative (FLEET) Rectal Enema 1 Enema Rectal <User Schedule>    MEDICATIONS  (PRN):  dextrose 40% Gel 15 Gram(s) Oral once PRN Blood Glucose LESS THAN 70 milliGRAM(s)/deciliter  glucagon  Injectable 1 milliGRAM(s) IntraMuscular once PRN Glucose LESS THAN 70 milligrams/deciliter  saline laxative (FLEET) Rectal Enema 1 Enema Rectal daily PRN constipation, to assist in bowel movement      OBJECTIVE    T(C): 36.9 (06-04-20 @ 07:34), Max: 38.2 (06-03-20 @ 17:33)  HR: 82 (06-04-20 @ 08:02) (69 - 92)  BP: 114/66 (06-04-20 @ 07:00) (105/71 - 175/72)  RR: 18 (06-04-20 @ 07:00) (14 - 25)  SpO2: 99% (06-04-20 @ 08:02) (95% - 100%)  Wt(kg): --  I&O's Summary    03 Jun 2020 07:01  -  04 Jun 2020 07:00  --------------------------------------------------------  IN: 1650 mL / OUT: 790 mL / NET: 860 mL  REVIEW OF SYSTEMS: ROS is unobtainable due to dementia and confusion   NOTE In accordance with  [facility] policy ICU final medical management decisions/MD orders are  determined/done only by ICU Intensivists   PHYSICAL EXAM:  HEENT: +trach  Resp: DC BS b/l  CVS: RRR  Abd: soft, NT/ND peg  Ext: no edema  Skin: warm well perfused  LABS:                        9.5    7.92  )-----------( 281      ( 04 Jun 2020 05:34 )             31.8     06-04    146<H>  |  112<H>  |  21  ----------------------------<  189<H>  3.7   |  27  |  0.73    Ca    8.5      04 Jun 2020 05:34  Phos  2.6     06-04  Mg     2.5     06-04    TPro  6.8  /  Alb  2.6<L>  /  TBili  0.4  /  DBili  x   /  AST  13<L>  /  ALT  18  /  AlkPhos  84  06-03    CAPILLARY BLOOD GLUCOSE      POCT Blood Glucose.: 187 mg/dL (04 Jun 2020 05:20)  POCT Blood Glucose.: 207 mg/dL (03 Jun 2020 22:50)  POCT Blood Glucose.: 192 mg/dL (03 Jun 2020 18:46)  POCT Blood Glucose.: 231 mg/dL (03 Jun 2020 17:22)  POCT Blood Glucose.: 268 mg/dL (03 Jun 2020 12:18)          Culture - Sputum (collected 29 May 2020 21:16)  Source: .Sputum Sputum  Gram Stain (29 May 2020 22:19):    Few polymorphonuclear leukocytes per low power field    Few Squamous epithelial cells per low power field    Few Gram Negative Rods per oil power field  Final Report (31 May 2020 16:00):    Few Pseudomonas aeruginosa    Normal Respiratory Elvira present  Organism: Pseudomonas aeruginosa (31 May 2020 16:00)  Organism: Pseudomonas aeruginosa (31 May 2020 16:00)    Culture - Urine (collected 29 May 2020 01:06)  Source: .Urine Catheterized  Final Report (30 May 2020 07:47):    <10,000 CFU/mL Normal Urogenital Elvira    Culture - Blood (collected 29 May 2020 00:35)  Source: .Blood Blood-Peripheral  Final Report (03 Jun 2020 01:00):    No Growth Final    Culture - Blood (collected 29 May 2020 00:35)  Source: .Blood Blood-Peripheral  Final Report (03 Jun 2020 01:00):    No Growth Final      RADIOLOGY & ADDITIONAL TESTS:    reviewed elctronically  ASSESSMENT/PLAN:

## 2020-06-04 NOTE — PROGRESS NOTE ADULT - SUBJECTIVE AND OBJECTIVE BOX
Neurology follow up note    BREA [id number]      Interval History:    Patient resting in bed    MEDICATIONS    chlorhexidine 0.12% Liquid 15 milliLiter(s) Oral Mucosa every 12 hours  chlorhexidine 2% Cloths 1 Application(s) Topical daily  dextrose 40% Gel 15 Gram(s) Oral once PRN  dextrose 5%. 1000 milliLiter(s) IV Continuous <Continuous>  dextrose 50% Injectable 12.5 Gram(s) IV Push once  dextrose 50% Injectable 25 Gram(s) IV Push once  dextrose 50% Injectable 25 Gram(s) IV Push once  enoxaparin Injectable 40 milliGRAM(s) SubCutaneous daily  fentaNYL   Patch  12 MICROgram(s)/Hr 1 Patch Transdermal every 72 hours  glucagon  Injectable 1 milliGRAM(s) IntraMuscular once PRN  insulin glargine Injectable (LANTUS) 20 Unit(s) SubCutaneous at bedtime  insulin lispro (HumaLOG) corrective regimen sliding scale   SubCutaneous every 6 hours  pantoprazole  Injectable 40 milliGRAM(s) IV Push daily  polyethylene glycol 3350 17 Gram(s) Oral daily  saline laxative (FLEET) Rectal Enema 1 Enema Rectal <User Schedule>  saline laxative (FLEET) Rectal Enema 1 Enema Rectal daily PRN      Allergies    codeine (Hives)    Intolerances            Vital Signs Last 24 Hrs  T(C): 36.9 (04 Jun 2020 07:34), Max: 38.2 (03 Jun 2020 17:33)  T(F): 98.4 (04 Jun 2020 07:34), Max: 100.7 (03 Jun 2020 17:33)  HR: 78 (04 Jun 2020 09:00) (69 - 92)  BP: 160/70 (04 Jun 2020 09:00) (105/71 - 175/72)  BP(mean): 100 (04 Jun 2020 09:00) (81 - 104)  RR: 17 (04 Jun 2020 09:00) (14 - 25)  SpO2: 100% (04 Jun 2020 09:00) (95% - 100%)        REVIEW OF SYSTEMS:  Could not be obtained secondary to the patient being nonverbal.  As per my conversation with the spouse, a gradual process along with underlying strokes causing her to become bed-bound.    PHYSICAL EXAMINATION:    Head:  Normocephalic, atraumatic.  Eyes:  No scleral icterus.  Ears:  Cannot be evaluated secondary to the patient being nonverbal.  NECK:  Tracheostomy was in place.  RESPIRATORY:  Good air entry bilaterally.  CARDIOVASCULAR:  S1 and S2 heard.  ABDOMEN:  Soft and nontender.  EXTREMITIES:  No clubbing or cyanosis were noted.      NEUROLOGIC:  The patient was awake in bed.  Upon stimulating the patient, her eyes did roll up.  Her body started to stiffen with abnormal mouth movements, lasted one to two minutes.  Pupils bilaterally were 3 mm, reactive to 2 mm.  The patient has her arms in a flexed position with both hands contracted.  Bilateral lower extremities were in a straight position.  The patient has increased tone, bilateral upper and lower extremities.             LABS:  CBC Full  -  ( 04 Jun 2020 05:34 )  WBC Count : 7.92 K/uL  RBC Count : 3.51 M/uL  Hemoglobin : 9.5 g/dL  Hematocrit : 31.8 %  Platelet Count - Automated : 281 K/uL  Mean Cell Volume : 90.6 fl  Mean Cell Hemoglobin : 27.1 pg  Mean Cell Hemoglobin Concentration : 29.9 gm/dL  Auto Neutrophil # : x  Auto Lymphocyte # : x  Auto Monocyte # : x  Auto Eosinophil # : x  Auto Basophil # : x  Auto Neutrophil % : x  Auto Lymphocyte % : x  Auto Monocyte % : x  Auto Eosinophil % : x  Auto Basophil % : x      06-04    146<H>  |  112<H>  |  21  ----------------------------<  189<H>  3.7   |  27  |  0.73    Ca    8.5      04 Jun 2020 05:34  Phos  2.6     06-04  Mg     2.5     06-04    TPro  6.8  /  Alb  2.6<L>  /  TBili  0.4  /  DBili  x   /  AST  13<L>  /  ALT  18  /  AlkPhos  84  06-03    Hemoglobin A1C:     LIVER FUNCTIONS - ( 03 Jun 2020 06:25 )  Alb: 2.6 g/dL / Pro: 6.8 g/dL / ALK PHOS: 84 U/L / ALT: 18 U/L / AST: 13 U/L / GGT: x           Vitamin B12         RADIOLOGY        ANALYSIS AND PLAN:  A 74-year-old with abnormal movements.  1.	For abnormal stiffening, suspect most likely secondary to a history of underlying neurodegenerative type process with degeneration of the patient's brain parenchyma, causing her to have these abnormal movements.  As per my conversation with the spouse, this has been going on for over five years.  She has had over 200 of these events, and as per my conversation with him, he did have EEG prolonged monitoring which recorded these events, they were not seizure activities.  2.	No need for any antiepileptic medications.  3.	For respiratory failure, monitor respiratory status.  4.	Monitor the patient's systolic blood pressure.  5.	Monitor electrolytes.  6.	Spoke with the spouse in great detail, Marciano in the past   7.	From neurology standpoint only, the patient is stable.  8.	Advanced care directives were discussed with the spouse.  9.	spoke to spouse in the past about possible medications to add medications for these movement he is refusing     Greater than 34 minutes spent in direct patient care reviewing  the notes, lab data/ imaging , discussion with multidisciplinary team.

## 2020-06-05 ENCOUNTER — TRANSCRIPTION ENCOUNTER (OUTPATIENT)
Age: 75
End: 2020-06-05

## 2020-06-05 VITALS — HEART RATE: 86 BPM | OXYGEN SATURATION: 99 %

## 2020-06-05 LAB
ALBUMIN SERPL ELPH-MCNC: 2.5 G/DL — LOW (ref 3.3–5)
ALP SERPL-CCNC: 74 U/L — SIGNIFICANT CHANGE UP (ref 40–120)
ALT FLD-CCNC: 19 U/L — SIGNIFICANT CHANGE UP (ref 12–78)
ANION GAP SERPL CALC-SCNC: 5 MMOL/L — SIGNIFICANT CHANGE UP (ref 5–17)
AST SERPL-CCNC: 13 U/L — LOW (ref 15–37)
BASOPHILS # BLD AUTO: 0.03 K/UL — SIGNIFICANT CHANGE UP (ref 0–0.2)
BASOPHILS NFR BLD AUTO: 0.5 % — SIGNIFICANT CHANGE UP (ref 0–2)
BILIRUB SERPL-MCNC: 0.5 MG/DL — SIGNIFICANT CHANGE UP (ref 0.2–1.2)
BUN SERPL-MCNC: 19 MG/DL — SIGNIFICANT CHANGE UP (ref 7–23)
CALCIUM SERPL-MCNC: 8.9 MG/DL — SIGNIFICANT CHANGE UP (ref 8.5–10.1)
CHLORIDE SERPL-SCNC: 111 MMOL/L — HIGH (ref 96–108)
CO2 SERPL-SCNC: 28 MMOL/L — SIGNIFICANT CHANGE UP (ref 22–31)
CREAT SERPL-MCNC: 0.74 MG/DL — SIGNIFICANT CHANGE UP (ref 0.5–1.3)
EOSINOPHIL # BLD AUTO: 0.15 K/UL — SIGNIFICANT CHANGE UP (ref 0–0.5)
EOSINOPHIL NFR BLD AUTO: 2.5 % — SIGNIFICANT CHANGE UP (ref 0–6)
GLUCOSE SERPL-MCNC: 164 MG/DL — HIGH (ref 70–99)
HCT VFR BLD CALC: 33.4 % — LOW (ref 34.5–45)
HGB BLD-MCNC: 9.9 G/DL — LOW (ref 11.5–15.5)
IMM GRANULOCYTES NFR BLD AUTO: 1.3 % — SIGNIFICANT CHANGE UP (ref 0–1.5)
LYMPHOCYTES # BLD AUTO: 1.09 K/UL — SIGNIFICANT CHANGE UP (ref 1–3.3)
LYMPHOCYTES # BLD AUTO: 18 % — SIGNIFICANT CHANGE UP (ref 13–44)
MAGNESIUM SERPL-MCNC: 2.5 MG/DL — SIGNIFICANT CHANGE UP (ref 1.6–2.6)
MCHC RBC-ENTMCNC: 26.9 PG — LOW (ref 27–34)
MCHC RBC-ENTMCNC: 29.6 GM/DL — LOW (ref 32–36)
MCV RBC AUTO: 90.8 FL — SIGNIFICANT CHANGE UP (ref 80–100)
MONOCYTES # BLD AUTO: 0.69 K/UL — SIGNIFICANT CHANGE UP (ref 0–0.9)
MONOCYTES NFR BLD AUTO: 11.4 % — SIGNIFICANT CHANGE UP (ref 2–14)
NEUTROPHILS # BLD AUTO: 4 K/UL — SIGNIFICANT CHANGE UP (ref 1.8–7.4)
NEUTROPHILS NFR BLD AUTO: 66.3 % — SIGNIFICANT CHANGE UP (ref 43–77)
NRBC # BLD: 0 /100 WBCS — SIGNIFICANT CHANGE UP (ref 0–0)
PHOSPHATE SERPL-MCNC: 2.9 MG/DL — SIGNIFICANT CHANGE UP (ref 2.5–4.5)
PLATELET # BLD AUTO: 275 K/UL — SIGNIFICANT CHANGE UP (ref 150–400)
POTASSIUM SERPL-MCNC: 4.3 MMOL/L — SIGNIFICANT CHANGE UP (ref 3.5–5.3)
POTASSIUM SERPL-SCNC: 4.3 MMOL/L — SIGNIFICANT CHANGE UP (ref 3.5–5.3)
PREALB SERPL-MCNC: 19 MG/DL — LOW (ref 20–40)
PROT SERPL-MCNC: 6.6 G/DL — SIGNIFICANT CHANGE UP (ref 6–8.3)
RBC # BLD: 3.68 M/UL — LOW (ref 3.8–5.2)
RBC # FLD: 15.9 % — HIGH (ref 10.3–14.5)
SODIUM SERPL-SCNC: 144 MMOL/L — SIGNIFICANT CHANGE UP (ref 135–145)
WBC # BLD: 6.04 K/UL — SIGNIFICANT CHANGE UP (ref 3.8–10.5)
WBC # FLD AUTO: 6.04 K/UL — SIGNIFICANT CHANGE UP (ref 3.8–10.5)

## 2020-06-05 PROCEDURE — 87186 SC STD MICRODIL/AGAR DIL: CPT

## 2020-06-05 PROCEDURE — 87635 SARS-COV-2 COVID-19 AMP PRB: CPT

## 2020-06-05 PROCEDURE — 94760 N-INVAS EAR/PLS OXIMETRY 1: CPT

## 2020-06-05 PROCEDURE — 94002 VENT MGMT INPAT INIT DAY: CPT

## 2020-06-05 PROCEDURE — 99232 SBSQ HOSP IP/OBS MODERATE 35: CPT

## 2020-06-05 PROCEDURE — 83605 ASSAY OF LACTIC ACID: CPT

## 2020-06-05 PROCEDURE — 87070 CULTURE OTHR SPECIMN AEROBIC: CPT

## 2020-06-05 PROCEDURE — 93005 ELECTROCARDIOGRAM TRACING: CPT

## 2020-06-05 PROCEDURE — 87899 AGENT NOS ASSAY W/OPTIC: CPT

## 2020-06-05 PROCEDURE — 84146 ASSAY OF PROLACTIN: CPT

## 2020-06-05 PROCEDURE — 87640 STAPH A DNA AMP PROBE: CPT

## 2020-06-05 PROCEDURE — 80048 BASIC METABOLIC PNL TOTAL CA: CPT

## 2020-06-05 PROCEDURE — 87641 MR-STAPH DNA AMP PROBE: CPT

## 2020-06-05 PROCEDURE — 36415 COLL VENOUS BLD VENIPUNCTURE: CPT

## 2020-06-05 PROCEDURE — 80053 COMPREHEN METABOLIC PANEL: CPT

## 2020-06-05 PROCEDURE — 80076 HEPATIC FUNCTION PANEL: CPT

## 2020-06-05 PROCEDURE — 94003 VENT MGMT INPAT SUBQ DAY: CPT

## 2020-06-05 PROCEDURE — 84134 ASSAY OF PREALBUMIN: CPT

## 2020-06-05 PROCEDURE — 82962 GLUCOSE BLOOD TEST: CPT

## 2020-06-05 PROCEDURE — 87451 POLYVALENT MULT ORG EA AG IA: CPT

## 2020-06-05 PROCEDURE — 84100 ASSAY OF PHOSPHORUS: CPT

## 2020-06-05 PROCEDURE — 83036 HEMOGLOBIN GLYCOSYLATED A1C: CPT

## 2020-06-05 PROCEDURE — 99285 EMERGENCY DEPT VISIT HI MDM: CPT

## 2020-06-05 PROCEDURE — 85027 COMPLETE CBC AUTOMATED: CPT

## 2020-06-05 PROCEDURE — 82803 BLOOD GASES ANY COMBINATION: CPT

## 2020-06-05 PROCEDURE — 70450 CT HEAD/BRAIN W/O DYE: CPT

## 2020-06-05 PROCEDURE — 86769 SARS-COV-2 COVID-19 ANTIBODY: CPT

## 2020-06-05 PROCEDURE — 94799 UNLISTED PULMONARY SVC/PX: CPT

## 2020-06-05 PROCEDURE — 71045 X-RAY EXAM CHEST 1 VIEW: CPT

## 2020-06-05 PROCEDURE — 83735 ASSAY OF MAGNESIUM: CPT

## 2020-06-05 RX ORDER — INSULIN GLARGINE 100 [IU]/ML
25 INJECTION, SOLUTION SUBCUTANEOUS AT BEDTIME
Refills: 0 | Status: DISCONTINUED | OUTPATIENT
Start: 2020-06-05 | End: 2020-06-05

## 2020-06-05 RX ADMIN — PANTOPRAZOLE SODIUM 40 MILLIGRAM(S): 20 TABLET, DELAYED RELEASE ORAL at 11:43

## 2020-06-05 RX ADMIN — CHLORHEXIDINE GLUCONATE 15 MILLILITER(S): 213 SOLUTION TOPICAL at 17:36

## 2020-06-05 RX ADMIN — Medication 1 ENEMA: at 17:39

## 2020-06-05 RX ADMIN — Medication 6: at 11:44

## 2020-06-05 RX ADMIN — ENOXAPARIN SODIUM 40 MILLIGRAM(S): 100 INJECTION SUBCUTANEOUS at 11:43

## 2020-06-05 RX ADMIN — CHLORHEXIDINE GLUCONATE 1 APPLICATION(S): 213 SOLUTION TOPICAL at 11:44

## 2020-06-05 RX ADMIN — Medication 2: at 17:34

## 2020-06-05 RX ADMIN — FENTANYL CITRATE 1 PATCH: 50 INJECTION INTRAVENOUS at 07:30

## 2020-06-05 RX ADMIN — Medication 2: at 05:55

## 2020-06-05 RX ADMIN — CHLORHEXIDINE GLUCONATE 15 MILLILITER(S): 213 SOLUTION TOPICAL at 05:56

## 2020-06-05 NOTE — PROGRESS NOTE ADULT - PROBLEM SELECTOR PLAN 1
Fentanyl Patch added -   readied for DC -  is appealing   has concerns - noted on record  planned for return to [facility] - long term resident - covid testing prior to DC -   covid ab positive  remains ventilated - AC CMV  chr resp failure - not a candidate for weaning -   sepsis eval - monitored off ABX - ID follow up noted  Neuro follow up noted - no recs for AED - stable for DC as per NEURO  RYAN - resolved -   lives in SNF - vent unit - dc planning - CM eval and follow up   well known to me from [facility] SNF  pt is full code  GOC documented  spoke with  and daughters  vent support  cx noted  labs reviewed  imaging reviewed.
covid ab positive  remains ventilated - AC CMV  chr resp failure  sepsis eval - monitored off ABX - ID follow up noted  RYAN  lives in SNF - vent unit  well known to me from [facility] SNF  pt is full code  GOC documented  spoke with  and daughters  vent support  s/p ABX rx regimen  cx noted  labs reviewed  imaging reviewed.
covid ab positive  remains ventilated - AC CMV  chr resp failure - not a candidate for weaning -   sepsis eval - monitored off ABX - ID follow up noted  Neuro follow up noted - no recs for AED - stable for DC as per NEURO  RYAN - resolved -   lives in SNF - vent unit - dc planning - CM eval and follow up   well known to me from [facility] SNF  pt is full code  GOC documented  spoke with  and daughters  vent support  cx noted  labs reviewed  imaging reviewed.
dc planning   has concerns - noted on record  planned for return to [facility] - long term resident - covid testing prior to DC -   covid ab positive  remains ventilated - AC CMV  chr resp failure - not a candidate for weaning -   sepsis eval - monitored off ABX - ID follow up noted  Neuro follow up noted - no recs for AED - stable for DC as per NEURO  RYAN - resolved -   lives in SNF - vent unit - dc planning - CM eval and follow up   well known to me from [facility] SNF  pt is full code  GOC documented  spoke with  and daughters  vent support  cx noted  labs reviewed  imaging reviewed.
planned for return to [facility] - long term resident - covid testing prior to DC -   covid ab positive  remains ventilated - AC CMV  chr resp failure - not a candidate for weaning -   sepsis eval - monitored off ABX - ID follow up noted  Neuro follow up noted - no recs for AED - stable for DC as per NEURO  RYAN - resolved -   lives in SNF - vent unit - dc planning - CM eval and follow up   well known to me from [facility] SNF  pt is full code  GOC documented  spoke with  and daughters  vent support  cx noted  labs reviewed  imaging reviewed.
seen and examined  VS and HD and Sat noted  readied for DC -  is appealing   has concerns - noted on record  planned for return to [facility] - long term resident - covid testing prior to DC -   covid ab positive  remains ventilated - AC CMV  chr resp failure - not a candidate for weaning -   sepsis eval - monitored off ABX - ID follow up noted  Neuro follow up noted - no recs for AED - stable for DC as per NEURO  RYAN - resolved -   lives in SNF - vent unit - dc planning - CM eval and follow up   well known to me from [facility] SNF  pt is full code  GOC documented  spoke with  and daughters  vent support  cx noted  labs reviewed  imaging reviewed.
seen and examined - VS noted - Labs reviewed - remains on ABX regimen and mech vent support  chr resp failure  sepsis eval   RYAN  lives in SNF - vent unit  well known to me from [facility] SNF  pt is full code  GOC documented  spoke with  and daughters  vent support  on ABX rx regimen  cx noted  labs reviewed  imaging reviewed.
Admitted for septic workup and evaluation ,send blood and urine cx ,serial lactate levels, monitor vitals closely hydration ,monitor urine output and renal profile ,iv abx initiated ,patient  already received Cefepime 2gm iv, vancomycin  1gm iv, rectal Tylenol 650mg, LR x 1 liter, NS x 1 liter in [place]
Chronic vent-dependent respiratory failure, continue full support to keep SpO2 >90%, settings: AC/14/420/30/5 currently. Seen by pulmonologist , serial chest xrays , ABGS ,admitted  to ICU for monitoring
chronic vent dependent respiratory failure, continue full vent support
low suspicion at this point for infectious  so observe off abx
low suspicion at this point for infectious  so would dc abx and observe off abx
radiographically stable exam ,bilateral infiltrates likely chronic
Seen by pulmonologist , serial chest xrays , ABGS ,admitted  to ICU for monitoring
chronic vent dependent respiratory failure, continue full vent support

## 2020-06-05 NOTE — PROGRESS NOTE ADULT - PROBLEM SELECTOR PLAN 10
Gastrointestinal stress ulcer prophylaxis and DVT prophylaxis

## 2020-06-05 NOTE — DISCHARGE NOTE NURSING/CASE MANAGEMENT/SOCIAL WORK - NSDPFAC_GEN_ALL_CORE
Patient to be discharged to [facility] at [facility]. # #[phone]

## 2020-06-05 NOTE — PROGRESS NOTE ADULT - PROBLEM SELECTOR PROBLEM 9
Advanced dementia

## 2020-06-05 NOTE — PROGRESS NOTE ADULT - PROBLEM SELECTOR PROBLEM 1
Advanced dementia
Chronic respiratory failure
Pneumonia due to infectious organism, unspecified laterality, unspecified part of lung
Pneumonia due to infectious organism, unspecified laterality, unspecified part of lung
Respiratory failure

## 2020-06-05 NOTE — PROGRESS NOTE ADULT - PROBLEM SELECTOR PROBLEM 4
2019 novel coronavirus disease (COVID-19)

## 2020-06-05 NOTE — PROGRESS NOTE ADULT - ASSESSMENT
cont rx cont rx      PARASHARAMI BREA  1945 DOA 2020 DR ILIANA KEMP     REVIEW OF SYMPTOMS      Able to give ROS  NO     PHYSICAL EXAM    HEENT Unremarkable PERRLA atraumatic   RESP Fair air entry EXP prolonged    Harsh breath sound Resp distres mild   CARDIAC S1 S2 No S3     NO JVD    ABDOMEN SOFT BS PRESENT NOT DISTENDED No hepatosplenomegaly PEDAL EDEMA present No calf tenderness  NO rash   GENERAL Not TOXIC looking    OXYGENATION        VITALS/LABS   2020 65 120/50   2020 ac 14/420/5/.3   2020 69 150/50     PT DATA/BEST PRACTICE  ALLERGY         codeine             WT     63 (2020)                                 BMI     23 (2020)                        CrCl                                  ADVANCED DIRECTIVE     HEAD OF BED ELEVATION Yes      INFECTION PPLX     Chlorhexidine 2% ()   Chlorhexidine .12% ()   DVT PROPHYLAXIS   hpsc ()  --> lvnx 40 ()               SQUIRES PROPHYLAXIS    Protonix 40 ()       DYSPHAGIA EVAL     DIET     npo () --> TF Flucerna 1.5 1100 (529)    FREE WATER 300.4 ()                                                                  IV F    (-)   ABIO   zosyn (-)   Vanco 1.2 (-)     COVID PCR  N-vinnie   COVID PCR  N-vinnie     W ----2020 W 21-8.9 -7-8.7-7.9   PROC 2020 PC 2.9  CXR  BL infiltr  MICROBIO    MRSA N-VINNIE    Sp Few GNR Pseudomonas    Urine C N-VINNIE  Blod C N-VINNIE    Strep pneu ag N-vinnie     LA --2020 LA 10.5-2.6 - 7.9     D-DIMR 2020 D-dimr 668  DUPLX 2020 V Duplex N-vinnie                                    Hb --- Hb 11-9.5-9.2-9.8     Na -2020 Na 151 - 146   K -2020 K 6-4.6   Cr 5/10- --2020 Cr 1-1.3-.7 -.8    AMS  CT   CT H N-vinnie     ABG   2020 1a ac 14/420/35%/5 730/51/117                               PATIENT SUMMARY     74 f PMH aphasic stroke functional quad dementia GERD trach vent dependent recently hospitalisd [facility] was COVID P+vinnie 2020 was admitted 2020 for dyspnea possible sepsis Pulm consulted     home meds psyllium feso4 insulin fentanyl 12 glarigine 20 lvnx 40                                        PAST ADMISSION -[facility]     HOSPITAL COURSE   Infection was ruled out   Possible unwitnessed seizure was felt to be cause of lactocemia and consequent aoc resp failure       PATIENT DATA/ASSESSMENT/RECOMMENDATIONS     RESP DISTRESS POA   Possibly  sec to Seizures vs Ventilator associated pneumonia (WBC Infiltrates)  Improved    MECHANICAL VENT   Monitor abg Target po 90-95% Vent protocol Trach care   Infection ppx    INFECTION  COVID  PCR N-VINNIE    FEVER POSSIBLE PNEUMONIA POA 2020   HO recent hospital stay   On vent Leukocytosis poa   Vanco zosyn  DCed 2020 as cultures N-VINNIE   sputum c showed Pseudomonas This is likely colonization   Dr Olivas on case      LACTICEMIA POA   Cause of LA may have been unwitnessed sz or sepsis   IV fluids and monitor serially       ELEVATED D-DIMR POA   ELEVATED PTT POA   Was on lovenox pplx at [facility]  2020 Suggest Check  V duplex  W  2020 Check  cta ch     HYPERNATREMIA NOTED 2020   started on fw 2020     RYAN POA   IV fluids   Improved    ANEMIA   Hb stable     DM   sl scale     RO SEIZURE   CT H N-vinnie     PLAN   Infection felt to be unlikely at this point  and abio dced   FW started 2020 for elevated Na     TIME SPENT Over 25 minutes aggregate care time spent on encounter; activities included   direct pt care, counseling and/or coordinating care reviewing notes, lab data/ imaging , discussion with multidisciplinary team/ pt /family. Risks, benefits, alternatives  discussed in detail.    PARASHARAMI BREA  1945 DOA 2020 DR ILIANA KEMP

## 2020-06-05 NOTE — PROGRESS NOTE ADULT - PROBLEM SELECTOR PROBLEM 8
GERD (gastroesophageal reflux disease)

## 2020-06-05 NOTE — PROGRESS NOTE ADULT - SUBJECTIVE AND OBJECTIVE BOX
BREA BECKHAM    [facility] ICU1 02    Patient is a 74y old  Female who presents with a chief complaint of RESPIRATORY DISTRESS (05 Jun 2020 08:13)       Allergies    codeine (Hives)    Intolerances        HPI:  74 Female transferred from  [facility] [place] [facility] [place] ED with report of sepsis, pneumonia, RYAN, patient on chronic trach collar , recent admission to [facility]  5/4/20 to 5/18/20 for hypoxia, pneumonia, COVID positive 04/27/20 ,developed hypoxia and was placed on respiratory support ,required ICU admission with shock and was placed on pressors  .Treated for UTI and pseudomonal pneumonia ,s/p cefepime ,had elevation of D-dimers .CTT was negative for PE , Doppler neg for DVT .Seen by neurologist for sz -like activity ,neurologist impression was that it was related to hypoxia CT Brain was negative for CVA .COVID  tested negative on 05/11 and 05/12  Patient had been sent to ER for respiratory distress ,associated with hypotension tachycardia ,tachypnea and diaphoresis  .Patient has a hx significant for Advanced dementia  ,Aphasic stroke  ,Constipation  ,COVID-19 virus detected  ,COPD ,Anxiety ,Dysphagia ,hyperkalemia ,pneumonia ,pulmonary edema , CVA (cerebral vascular accident)  ,Dementia of frontal lobe type  ,Diabetes mellitus  ,DM (diabetes mellitus)  ,GERD (gastroesophageal reflux disease)  ,HTN (hypertension)  ,Hypertension  ,Pneumonia  ,Quadriplegia  ,Respiratory failure  ,Feeding by G-tube  ,Gastrostomy in place  ,Hx of appendectomy  ,Tracheostomy in place  ,Tracheostomy tube present, 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 Admitted to ICU ,seen by pulm cons Dr Sandoval on admission to [place] ED ( transferred from [facility])and was admitted to ICU  for septic workup and evaluation ,send blood and urine cx, serial lactate levels ,monitor vitals closely hydration ,monitor urine output and renal profile ,iv abx initiated Palliative care consult requested ,to discuss advance directives and complete MOLST .Patient was diagnosed with sepsis 2/2 to pneumonia and  called ER respiratory and  ICU ,patient  already received Cefepime 2gm iv, vancomycin  1gm iv, rectal Tylenol 650mg, LR x 1 liter, NS x 1 liter in [place] ED .Further plan of care as per pulmonologist Dr Sandoval and icu team (28 May 2020 23:07)      PAST MEDICAL & SURGICAL HISTORY:  Pneumonia  Quadriplegia  COVID-19 virus detected  Advanced dementia  DM (diabetes mellitus)  HTN (hypertension)  CVA (cerebral vascular accident)  Respiratory failure  Constipation  GERD (gastroesophageal reflux disease)  Hypertension  Respiratory failure  Diabetes mellitus  Aphasic stroke  Dementia of frontal lobe type  Feeding by G-tube  Tracheostomy tube present  Tracheostomy in place  Gastrostomy in place  Hx of appendectomy      FAMILY HISTORY:  No pertinent family history in first degree relatives        MEDICATIONS   chlorhexidine 0.12% Liquid 15 milliLiter(s) Oral Mucosa every 12 hours  chlorhexidine 2% Cloths 1 Application(s) Topical daily  dextrose 40% Gel 15 Gram(s) Oral once PRN  dextrose 5%. 1000 milliLiter(s) IV Continuous <Continuous>  dextrose 50% Injectable 12.5 Gram(s) IV Push once  dextrose 50% Injectable 25 Gram(s) IV Push once  dextrose 50% Injectable 25 Gram(s) IV Push once  enoxaparin Injectable 40 milliGRAM(s) SubCutaneous daily  fentaNYL   Patch  12 MICROgram(s)/Hr 1 Patch Transdermal every 72 hours  glucagon  Injectable 1 milliGRAM(s) IntraMuscular once PRN  insulin glargine Injectable (LANTUS) 20 Unit(s) SubCutaneous at bedtime  insulin lispro (HumaLOG) corrective regimen sliding scale   SubCutaneous every 6 hours  pantoprazole  Injectable 40 milliGRAM(s) IV Push daily  saline laxative (FLEET) Rectal Enema 1 Enema Rectal <User Schedule>  saline laxative (FLEET) Rectal Enema 1 Enema Rectal daily PRN      Vital Signs Last 24 Hrs  T(C): 37.1 (05 Jun 2020 04:00), Max: 37.6 (04 Jun 2020 15:41)  T(F): 98.8 (05 Jun 2020 04:00), Max: 99.7 (04 Jun 2020 15:41)  HR: 65 (05 Jun 2020 08:00) (62 - 80)  BP: 151/64 (05 Jun 2020 08:00) (119/57 - 168/72)  BP(mean): 92 (05 Jun 2020 08:00) (75 - 103)  RR: 14 (05 Jun 2020 08:00) (14 - 23)  SpO2: 100% (05 Jun 2020 08:00) (88% - 100%)      06-04-20 @ 07:01  -  06-05-20 @ 07:00  --------------------------------------------------------  IN: 2300 mL / OUT: 800 mL / NET: 1500 mL        Mode: AC/ CMV (Assist Control/ Continuous Mandatory Ventilation), RR (machine): 14, TV (machine): 420, FiO2: 30, PEEP: 5, ITime: 1, MAP: 9.8, PIP: 27    LABS:                        9.9    6.04  )-----------( 275      ( 05 Jun 2020 05:35 )             33.4     06-05    144  |  111<H>  |  19  ----------------------------<  164<H>  4.3   |  28  |  0.74    Ca    8.9      05 Jun 2020 05:35  Phos  2.9     06-05  Mg     2.5     06-05    TPro  6.6  /  Alb  2.5<L>  /  TBili  0.5  /  DBili  x   /  AST  13<L>  /  ALT  19  /  AlkPhos  74  06-05              WBC:  WBC Count: 6.04 K/uL (06-05 @ 05:35)  WBC Count: 7.92 K/uL (06-04 @ 05:34)  WBC Count: 7.52 K/uL (06-03 @ 06:25)  WBC Count: 6.33 K/uL (06-02 @ 05:50)      MICROBIOLOGY:  RECENT CULTURES:  05-29 .Sputum Sputum Pseudomonas aeruginosa   Few polymorphonuclear leukocytes per low power field  Few Squamous epithelial cells per low power field  Few Gram Negative Rods per oil power field   Few Pseudomonas aeruginosa  Normal Respiratory Elvira present                    Sodium:  Sodium, Serum: 144 mmol/L (06-05 @ 05:35)  Sodium, Serum: 146 mmol/L (06-04 @ 05:34)  Sodium, Serum: 145 mmol/L (06-03 @ 06:25)  Sodium, Serum: 144 mmol/L (06-02 @ 16:33)  Sodium, Serum: 151 mmol/L (06-02 @ 05:50)      0.74 mg/dL 06-05 @ 05:35  0.73 mg/dL 06-04 @ 05:34  0.75 mg/dL 06-03 @ 06:25  0.78 mg/dL 06-02 @ 16:33  0.82 mg/dL 06-02 @ 05:50      Hemoglobin:  Hemoglobin: 9.9 g/dL (06-05 @ 05:35)  Hemoglobin: 9.5 g/dL (06-04 @ 05:34)  Hemoglobin: 10.0 g/dL (06-03 @ 06:25)  Hemoglobin: 9.5 g/dL (06-02 @ 05:50)      Platelets: Platelet Count - Automated: 275 K/uL (06-05 @ 05:35)  Platelet Count - Automated: 281 K/uL (06-04 @ 05:34)  Platelet Count - Automated: 283 K/uL (06-03 @ 06:25)  Platelet Count - Automated: 269 K/uL (06-02 @ 05:50)      LIVER FUNCTIONS - ( 05 Jun 2020 05:35 )  Alb: 2.5 g/dL / Pro: 6.6 g/dL / ALK PHOS: 74 U/L / ALT: 19 U/L / AST: 13 U/L / GGT: x                 RADIOLOGY & ADDITIONAL STUDIES:

## 2020-06-05 NOTE — PROGRESS NOTE ADULT - SUBJECTIVE AND OBJECTIVE BOX
Neurology follow up note    BREA [id number]      Interval History:    Patient resting in bed    MEDICATIONS    chlorhexidine 0.12% Liquid 15 milliLiter(s) Oral Mucosa every 12 hours  chlorhexidine 2% Cloths 1 Application(s) Topical daily  dextrose 40% Gel 15 Gram(s) Oral once PRN  dextrose 5%. 1000 milliLiter(s) IV Continuous <Continuous>  dextrose 50% Injectable 12.5 Gram(s) IV Push once  dextrose 50% Injectable 25 Gram(s) IV Push once  dextrose 50% Injectable 25 Gram(s) IV Push once  enoxaparin Injectable 40 milliGRAM(s) SubCutaneous daily  fentaNYL   Patch  12 MICROgram(s)/Hr 1 Patch Transdermal every 72 hours  glucagon  Injectable 1 milliGRAM(s) IntraMuscular once PRN  insulin glargine Injectable (LANTUS) 25 Unit(s) SubCutaneous at bedtime  insulin lispro (HumaLOG) corrective regimen sliding scale   SubCutaneous every 6 hours  pantoprazole  Injectable 40 milliGRAM(s) IV Push daily  saline laxative (FLEET) Rectal Enema 1 Enema Rectal <User Schedule>  saline laxative (FLEET) Rectal Enema 1 Enema Rectal daily PRN      Allergies    codeine (Hives)    Intolerances            Vital Signs Last 24 Hrs  T(C): 36.4 (05 Jun 2020 08:00), Max: 37.6 (04 Jun 2020 15:41)  T(F): 97.6 (05 Jun 2020 08:00), Max: 99.7 (04 Jun 2020 15:41)  HR: 67 (05 Jun 2020 11:00) (62 - 80)  BP: 138/71 (05 Jun 2020 11:00) (119/57 - 168/72)  BP(mean): 96 (05 Jun 2020 11:00) (75 - 103)  RR: 20 (05 Jun 2020 11:00) (14 - 23)  SpO2: 100% (05 Jun 2020 11:00) (88% - 100%)      REVIEW OF SYSTEMS:  Could not be obtained secondary to the patient being nonverbal.  As per my conversation with the spouse, a gradual process along with underlying strokes causing her to become bed-bound.    PHYSICAL EXAMINATION:    Head:  Normocephalic, atraumatic.  Eyes:  No scleral icterus.  Ears:  Cannot be evaluated secondary to the patient being nonverbal.  NECK:  Tracheostomy was in place.  RESPIRATORY:  Good air entry bilaterally.  CARDIOVASCULAR:  S1 and S2 heard.  ABDOMEN:  Soft and nontender.  EXTREMITIES:  No clubbing or cyanosis were noted.      NEUROLOGIC:  The patient was awake in bed.  Upon stimulating the patient, her eyes did roll up.  Her body started to stiffen with abnormal mouth movements, lasted one to two minutes.  Pupils bilaterally were 3 mm, reactive to 2 mm.  The patient has her arms in a flexed position with both hands contracted.  Bilateral lower extremities were in a straight position.  The patient has increased tone, bilateral upper and lower extremities.                LABS:  CBC Full  -  ( 05 Jun 2020 05:35 )  WBC Count : 6.04 K/uL  RBC Count : 3.68 M/uL  Hemoglobin : 9.9 g/dL  Hematocrit : 33.4 %  Platelet Count - Automated : 275 K/uL  Mean Cell Volume : 90.8 fl  Mean Cell Hemoglobin : 26.9 pg  Mean Cell Hemoglobin Concentration : 29.6 gm/dL  Auto Neutrophil # : 4.00 K/uL  Auto Lymphocyte # : 1.09 K/uL  Auto Monocyte # : 0.69 K/uL  Auto Eosinophil # : 0.15 K/uL  Auto Basophil # : 0.03 K/uL  Auto Neutrophil % : 66.3 %  Auto Lymphocyte % : 18.0 %  Auto Monocyte % : 11.4 %  Auto Eosinophil % : 2.5 %  Auto Basophil % : 0.5 %      06-05    144  |  111<H>  |  19  ----------------------------<  164<H>  4.3   |  28  |  0.74    Ca    8.9      05 Jun 2020 05:35  Phos  2.9     06-05  Mg     2.5     06-05    TPro  6.6  /  Alb  2.5<L>  /  TBili  0.5  /  DBili  x   /  AST  13<L>  /  ALT  19  /  AlkPhos  74  06-05    Hemoglobin A1C:     LIVER FUNCTIONS - ( 05 Jun 2020 05:35 )  Alb: 2.5 g/dL / Pro: 6.6 g/dL / ALK PHOS: 74 U/L / ALT: 19 U/L / AST: 13 U/L / GGT: x           Vitamin B12         RADIOLOGY      ANALYSIS AND PLAN:  A 74-year-old with abnormal movements.  1.	For abnormal stiffening, suspect most likely secondary to a history of underlying neurodegenerative type process with degeneration of the patient's brain parenchyma, causing her to have these abnormal movements.  As per my conversation with the spouse, this has been going on for over five years.  She has had over 200 of these events, and as per my conversation with him, he did have EEG prolonged monitoring which recorded these events, they were not seizure activities.  2.	No need for any antiepileptic medications.  3.	For respiratory failure, monitor respiratory status.  4.	Monitor the patient's systolic blood pressure.  5.	Monitor electrolytes.  6.	Spoke with the spouse in great detail, Marciano in the past   7.	From neurology standpoint only, the patient is stable.  8.	Advanced care directives were discussed with the spouse.  9.	spoke to spouse in the past about possible medications to add medications for these movement he is refusing     Greater than 34 minutes spent in direct patient care reviewing  the notes, lab data/ imaging , discussion with multidisciplinary team.

## 2020-06-05 NOTE — PROGRESS NOTE ADULT - SUBJECTIVE AND OBJECTIVE BOX
PROGRESS NOTE  Patient is a 74y old  Female who presents with a chief complaint of RESPIRATORY DISTRESS (05 Jun 2020 12:21)    Chart and available morning labs /imaging are reviewed electronically , urgent issues addressed . More information  is being added upon completion of rounds , when more information is collected and management discussed with consultants , medical staff and social service/case management on the floor   OVERNIGHT  No new issues reported by medical staff . All above noted     HPI:  74 Female transferred from  [facility] to [place] ED with report of sepsis, pneumonia, RYAN, patient on chronic trach collar , recent admission to [facility]  5/4/20 to 5/18/20 for hypoxia, pneumonia, COVID positive 04/27/20 ,developed hypoxia and was placed on respiratory support ,required ICU admission with shock and was placed on pressors  .Treated for UTI and pseudomonal pneumonia ,s/p cefepime ,had elevation of D-dimers .CTT was negative for PE , Doppler neg for DVT .Seen by neurologist for sz -like activity ,neurologist impression was that it was related to hypoxia CT Brain was negative for CVA .COVID  tested negative on 05/11 and 05/12  Patient had been sent to ER for respiratory distress ,associated with hypotension tachycardia ,tachypnea and diaphoresis  .Patient has a hx significant for Advanced dementia  ,Aphasic stroke  ,Constipation  ,COVID-19 virus detected  ,COPD ,Anxiety ,Dysphagia ,hyperkalemia ,pneumonia ,pulmonary edema , CVA (cerebral vascular accident)  ,Dementia of frontal lobe type  ,Diabetes mellitus  ,DM (diabetes mellitus)  ,GERD (gastroesophageal reflux disease)  ,HTN (hypertension)  ,Hypertension  ,Pneumonia  ,Quadriplegia  ,Respiratory failure  ,Feeding by G-tube  ,Gastrostomy in place  ,Hx of appendectomy  ,Tracheostomy in place  ,Tracheostomy tube present, 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 Admitted to ICU ,seen by pulm cons Dr Sandoval on admission to [place] ED ( transferred from [facility])and was admitted to ICU  for septic workup and evaluation ,send blood and urine cx, serial lactate levels ,monitor vitals closely hydration ,monitor urine output and renal profile ,iv abx initiated Palliative care consult requested ,to discuss advance directives and complete MOLST .Patient was diagnosed with sepsis 2/2 to pneumonia and  called ER respiratory and  ICU ,patient  already received Cefepime 2gm iv, vancomycin  1gm iv, rectal Tylenol 650mg, LR x 1 liter, NS x 1 liter in [place] ED .Further plan of care as per pulmonologist Dr Sandoval and icu team (28 May 2020 23:07)    PAST MEDICAL & SURGICAL HISTORY:  Pneumonia  Quadriplegia  COVID-19 virus detected  Advanced dementia  DM (diabetes mellitus)  HTN (hypertension)  CVA (cerebral vascular accident)  Respiratory failure  Constipation  GERD (gastroesophageal reflux disease)  Hypertension  Respiratory failure  Diabetes mellitus  Aphasic stroke  Dementia of frontal lobe type  Feeding by G-tube  Tracheostomy tube present  Tracheostomy in place  Gastrostomy in place  Hx of appendectomy      MEDICATIONS  (STANDING):  chlorhexidine 0.12% Liquid 15 milliLiter(s) Oral Mucosa every 12 hours  chlorhexidine 2% Cloths 1 Application(s) Topical daily  dextrose 5%. 1000 milliLiter(s) (50 mL/Hr) IV Continuous <Continuous>  dextrose 50% Injectable 12.5 Gram(s) IV Push once  dextrose 50% Injectable 25 Gram(s) IV Push once  dextrose 50% Injectable 25 Gram(s) IV Push once  enoxaparin Injectable 40 milliGRAM(s) SubCutaneous daily  fentaNYL   Patch  12 MICROgram(s)/Hr 1 Patch Transdermal every 72 hours  insulin glargine Injectable (LANTUS) 25 Unit(s) SubCutaneous at bedtime  insulin lispro (HumaLOG) corrective regimen sliding scale   SubCutaneous every 6 hours  pantoprazole  Injectable 40 milliGRAM(s) IV Push daily  saline laxative (FLEET) Rectal Enema 1 Enema Rectal <User Schedule>    MEDICATIONS  (PRN):  dextrose 40% Gel 15 Gram(s) Oral once PRN Blood Glucose LESS THAN 70 milliGRAM(s)/deciliter  glucagon  Injectable 1 milliGRAM(s) IntraMuscular once PRN Glucose LESS THAN 70 milligrams/deciliter  saline laxative (FLEET) Rectal Enema 1 Enema Rectal daily PRN constipation, to assist in bowel movement      OBJECTIVE    T(C): 36.6 (06-05-20 @ 12:00), Max: 37.6 (06-04-20 @ 15:41)  HR: 77 (06-05-20 @ 12:51) (62 - 84)  BP: 153/70 (06-05-20 @ 12:00) (119/57 - 168/72)  RR: 18 (06-05-20 @ 12:00) (14 - 22)  SpO2: 100% (06-05-20 @ 12:51) (88% - 100%)  Wt(kg): --  I&O's Summary    04 Jun 2020 07:01  -  05 Jun 2020 07:00  --------------------------------------------------------  IN: 2300 mL / OUT: 800 mL / NET: 1500 mL    05 Jun 2020 07:01  -  05 Jun 2020 15:18  --------------------------------------------------------  IN: 500 mL / OUT: 300 mL / NET: 200 mL          REVIEW OF SYSTEMS:    ROS is unobtainable due to dementia   PHYSICAL EXAM:  Appearance: NAD. VS past 24 hrs -as above   HEENT:   Moist oral mucosa. Conjunctiva clear b/l.   Neck : supple trach  Respiratory: Lungs CTAB.  Gastrointestinal:  Soft, nontender. No rebound. No rigidity. BS present peg	  Cardiovascular: RRR ,S1S2 present  Neurologic: Non-focal. Moving all extremities.  Extremities: No edema. No erythema. No calf tenderness.  Skin: No rashes, No ecchymoses, No cyanosis.	  wounds ,skin lesions-See skin assesment flow sheet   LABS:                        9.9    6.04  )-----------( 275      ( 05 Jun 2020 05:35 )             33.4     06-05    144  |  111<H>  |  19  ----------------------------<  164<H>  4.3   |  28  |  0.74    Ca    8.9      05 Jun 2020 05:35  Phos  2.9     06-05  Mg     2.5     06-05    TPro  6.6  /  Alb  2.5<L>  /  TBili  0.5  /  DBili  x   /  AST  13<L>  /  ALT  19  /  AlkPhos  74  06-05    CAPILLARY BLOOD GLUCOSE      POCT Blood Glucose.: 269 mg/dL (05 Jun 2020 11:28)  POCT Blood Glucose.: 177 mg/dL (05 Jun 2020 05:33)  POCT Blood Glucose.: 219 mg/dL (04 Jun 2020 22:49)  POCT Blood Glucose.: 222 mg/dL (04 Jun 2020 17:30)          Culture - Sputum (collected 29 May 2020 21:16)  Source: .Sputum Sputum  Gram Stain (29 May 2020 22:19):    Few polymorphonuclear leukocytes per low power field    Few Squamous epithelial cells per low power field    Few Gram Negative Rods per oil power field  Final Report (31 May 2020 16:00):    Few Pseudomonas aeruginosa    Normal Respiratory Elvira present  Organism: Pseudomonas aeruginosa (31 May 2020 16:00)  Organism: Pseudomonas aeruginosa (31 May 2020 16:00)    Culture - Urine (collected 29 May 2020 01:06)  Source: .Urine Catheterized  Final Report (30 May 2020 07:47):    <10,000 CFU/mL Normal Urogenital Elvira    Culture - Blood (collected 29 May 2020 00:35)  Source: .Blood Blood-Peripheral  Final Report (03 Jun 2020 01:00):    No Growth Final    Culture - Blood (collected 29 May 2020 00:35)  Source: .Blood Blood-Peripheral  Final Report (03 Jun 2020 01:00):    No Growth Final      RADIOLOGY & ADDITIONAL TESTS:   reviewed elctronically  ASSESSMENT/PLAN:

## 2020-06-05 NOTE — PROGRESS NOTE ADULT - SUBJECTIVE AND OBJECTIVE BOX
Date/Time Patient Seen:  		  Referring MD:   Data Reviewed	       Patient is a 74y old  Female who presents with a chief complaint of RESPIRATORY DISTRESS (04 Jun 2020 21:37)      Subjective/HPI     PAST MEDICAL & SURGICAL HISTORY:  Pneumonia  Quadriplegia  COVID-19 virus detected  Advanced dementia  DM (diabetes mellitus)  HTN (hypertension)  CVA (cerebral vascular accident)  Respiratory failure  Constipation  GERD (gastroesophageal reflux disease)  Hypertension  Respiratory failure  Diabetes mellitus  Aphasic stroke  Dementia of frontal lobe type  Feeding by G-tube  Tracheostomy tube present  Tracheostomy in place  Gastrostomy in place  Hx of appendectomy        Medication list         MEDICATIONS  (STANDING):  chlorhexidine 0.12% Liquid 15 milliLiter(s) Oral Mucosa every 12 hours  chlorhexidine 2% Cloths 1 Application(s) Topical daily  dextrose 5%. 1000 milliLiter(s) (50 mL/Hr) IV Continuous <Continuous>  dextrose 50% Injectable 12.5 Gram(s) IV Push once  dextrose 50% Injectable 25 Gram(s) IV Push once  dextrose 50% Injectable 25 Gram(s) IV Push once  enoxaparin Injectable 40 milliGRAM(s) SubCutaneous daily  fentaNYL   Patch  12 MICROgram(s)/Hr 1 Patch Transdermal every 72 hours  insulin glargine Injectable (LANTUS) 20 Unit(s) SubCutaneous at bedtime  insulin lispro (HumaLOG) corrective regimen sliding scale   SubCutaneous every 6 hours  pantoprazole  Injectable 40 milliGRAM(s) IV Push daily  saline laxative (FLEET) Rectal Enema 1 Enema Rectal <User Schedule>    MEDICATIONS  (PRN):  dextrose 40% Gel 15 Gram(s) Oral once PRN Blood Glucose LESS THAN 70 milliGRAM(s)/deciliter  glucagon  Injectable 1 milliGRAM(s) IntraMuscular once PRN Glucose LESS THAN 70 milligrams/deciliter  saline laxative (FLEET) Rectal Enema 1 Enema Rectal daily PRN constipation, to assist in bowel movement         Vitals log        ICU Vital Signs Last 24 Hrs  T(C): 37.1 (05 Jun 2020 04:00), Max: 37.6 (04 Jun 2020 15:41)  T(F): 98.8 (05 Jun 2020 04:00), Max: 99.7 (04 Jun 2020 15:41)  HR: 65 (05 Jun 2020 06:00) (64 - 82)  BP: 128/59 (05 Jun 2020 06:00) (119/57 - 168/72)  BP(mean): 85 (05 Jun 2020 06:00) (75 - 103)  ABP: --  ABP(mean): --  RR: 14 (05 Jun 2020 06:00) (14 - 23)  SpO2: 100% (05 Jun 2020 06:00) (88% - 100%)       Mode: AC/ CMV (Assist Control/ Continuous Mandatory Ventilation)  RR (machine): 14  TV (machine): 420  FiO2: 30  PEEP: 5  ITime: 1  MAP: 10  PIP: 27      Input and Output:  I&O's Detail    04 Jun 2020 07:01  -  05 Jun 2020 07:00  --------------------------------------------------------  IN:    Enteral Tube Flush: 1200 mL    Glucerna 1.5: 1100 mL  Total IN: 2300 mL    OUT:    Indwelling Catheter - Urethral: 800 mL  Total OUT: 800 mL    Total NET: 1500 mL          Lab Data                        9.9    6.04  )-----------( 275      ( 05 Jun 2020 05:35 )             33.4     06-05    144  |  111<H>  |  19  ----------------------------<  164<H>  4.3   |  28  |  0.74    Ca    8.9      05 Jun 2020 05:35  Phos  2.9     06-05  Mg     2.5     06-05    TPro  6.6  /  Alb  2.5<L>  /  TBili  0.5  /  DBili  x   /  AST  13<L>  /  ALT  19  /  AlkPhos  74  06-05            Review of Systems	      Objective     Physical Examination    heart s1s2  lung dec BS      Pertinent Lab findings & Imaging      Annalise:  NO   Adequate UO     I&O's Detail    04 Jun 2020 07:01  -  05 Jun 2020 07:00  --------------------------------------------------------  IN:    Enteral Tube Flush: 1200 mL    Glucerna 1.5: 1100 mL  Total IN: 2300 mL    OUT:    Indwelling Catheter - Urethral: 800 mL  Total OUT: 800 mL    Total NET: 1500 mL               Discussed with:     Cultures:	        Radiology

## 2020-06-05 NOTE — DISCHARGE NOTE NURSING/CASE MANAGEMENT/SOCIAL WORK - PATIENT PORTAL LINK FT
You can access the FollowMyHealth Patient Portal offered by [facility] by registering at the following website: [email or web address]. By joining [employer]’s FollowMyHealth portal, you will also be able to view your health information using other applications (apps) compatible with our system.

## 2020-06-05 NOTE — PROGRESS NOTE ADULT - ATTENDING COMMENTS
74 Female transferred from  [facility] center to [place] ED with report of sepsis, pneumonia, RYAN, patient on chronic trach collar , recent admission to [facility]  5/4/20 to 5/18/20 for hypoxia, pneumonia, COVID positive 04/27/20 ,developed hypoxia and was placed on respiratory support ,required ICU admission with shock and was placed on pressors  .Treated for UTI and pseudomonal pneumonia ,s/p cefepime ,had elevation of D-dimers .CTT was negative for PE , Doppler neg for DVT .Seen by neurologist for sz -like activity ,neurologist impression was that it was related to hypoxia CT Brain was negative for CVA .COVID  tested negative on 05/11 and 05/12  Patient had been sent to ER for respiratory distress ,associated with hypotension tachycardia ,tachypnea and diaphoresis  .Patient has a hx significant for Advanced dementia  ,Aphasic stroke  ,Constipation  ,COVID-19 virus detected  ,COPD ,Anxiety ,Dysphagia ,hyperkalemia ,pneumonia ,pulmonary edema , CVA (cerebral vascular accident)  ,Dementia of frontal lobe type  ,Diabetes mellitus  ,DM (diabetes mellitus)  ,GERD (gastroesophageal reflux disease)  ,HTN (hypertension)  ,Hypertension  ,Pneumonia  ,Quadriplegia  ,Respiratory failure  ,Feeding by G-tube  ,Gastrostomy in place  ,Hx of appendectomy  ,Tracheostomy in place  ,Tracheostomy tube present ,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 Admitted to ICU ,seen by pulm cons Dr Rojelio Sandoval on admission to [place] ED ( transferred from [facility])and was admitted to ICU  for septic workup and evaluation ,send blood and urine cx, serial lactate levels ,monitor vitals closely hydration ,monitor urine output and renal profile ,iv abx initiated . Palliative care consult requested ,to discuss advance directives and complete MOLST .Patient was diagnosed with sepsis 2/2 to pneumonia and  called ER respiratory and  ICU ,patient  already received Cefepime 2gm iv, vancomycin  1gm iv, rectal Tylenol 650mg, LR x 1 liter, NS x 1 liter in [place] ED . Treated for sepsis (resolved )2/2 to probable  pneumonia ,poa -was on vanco and zosyn ,seen by ID CONSULT-  watch off abx ,  pseudomonas in sputum likely colonized - monitor off Abx; repeated  Covid negative [facility] 06[facility] ,as per social service  is not in agreement with plan and would like to discuss other placement options and appealing discharge .

## 2020-06-05 NOTE — PROGRESS NOTE ADULT - ASSESSMENT
75 y/o woman with PMHx of frontotemporal dementia, chronic vent dependent respiratory failure, bedbound at baseline who presents with respiratory distress, diaphoresis, and generalized spasm of extremities.  She was found to be febrile, hypertensive, tachycardic, and tachypneic on admission with severe lactic acidosis.  Concern for seizure activity with elevated lactate, initially downtrended with uptrend due to possible repeat seizure activity. Afebrile since yesterday afternoon, but otherwise resolved signs of infection and tolerating chronic vent settings.     Neuro: Concern for seizure activity on admission in light of severe lactic acidosis and elevated prolactin, no evidence currently. Extensive neurological w/u in past, including 3-day EEG, without evidence of seizure activity. No need for antiepileptic medications as per neuro. Abnormal stiffening likely 2/2 neurodegenerative process.   CV: Hemodynamically stable.   Pulm: Chronic vent-dependent respiratory failure, not a candidate for weaning trials. Continue full support to keep SpO2 >90%, settings: AC/14/420/30/5 currently.  Renal: Normal renal function. Monitor and supplement lytes PRN. Hypernatremia improved, continue free water 300cc Q6H and monitor BMP.  GI: Continue TFs 2/2 dysphagia. Continue bowel regimen for chronic constipation with miralax, fleet enema's MWF, and PRN fleet enemas at request of patient's family. Protonix for GI ppx.  ID: Trach aspirate cx from 5/29 with few pseudomonas, likely colonizer rather than true infection. UCx and BCx negative, MRSA negative, and COVID negative. COVID Ab positive. Continue to monitor off antibiotics.  Endo: T2DM, continue moderate dose SS Q6H and Lantus 20U QHS.  : Chronic vaughn catheter in place, continue for urinary retention.  Heme: Lovenox 40mg SQ daily for DVT ppx.  Dispo: Stable for d/c back to SNF. COVID PCR negative 6/2. COVID Ab positive.     . 73 y/o woman with PMHx of frontotemporal dementia, chronic vent dependent respiratory failure, bedbound at baseline who presents with respiratory distress, diaphoresis, and generalized spasm of extremities.  She was found to be febrile, hypertensive, tachycardic, and tachypneic on admission with severe lactic acidosis.  Concern for seizure activity with elevated lactate, initially downtrended with uptrend due to possible repeat seizure activity. Resolved signs of infection and tolerating chronic vent settings.     Neuro: Concern for seizure activity on admission in light of severe lactic acidosis and elevated prolactin, no evidence currently. Extensive neurological w/u in past, including 3-day EEG, without evidence of seizure activity. No need for antiepileptic medications as per neuro. Abnormal stiffening likely 2/2 neurodegenerative process.   CV: Hemodynamically stable.   Pulm: Chronic vent-dependent respiratory failure, not a candidate for weaning trials as becomes apneic. Continue full support to keep SpO2 >90%, settings: AC/14/420/30/5 currently.  Renal: Normal renal function. Monitor and supplement lytes PRN. Hypernatremia improved, continue free water 300cc Q6H and monitor BMP.  GI: Continue TFs 2/2 dysphagia. Continue bowel regimen for chronic constipation with miralax, fleet enema's MWF, and PRN fleet enemas at request of patient's family. Protonix for GI ppx.  ID: Trach aspirate cx from 5/29 with few pseudomonas, likely colonizer rather than true infection. UCx and BCx negative, MRSA negative, and COVID negative. COVID Ab positive. Continue to monitor off antibiotics.  Endo: T2DM, continue moderate dose SS Q6H and increase Lantus to 25U QHS.  : Chronic vaughn catheter in place, continue for urinary retention.  Heme: Lovenox 40mg SQ daily for DVT ppx.  Dispo: Stable for d/c back to SNF.  appealed discharge to [facility] twice, lost first appeal, second appeal placed yesterday. COVID PCR negative. COVID Ab positive.     .

## 2020-06-05 NOTE — PROGRESS NOTE ADULT - ASSESSMENT
74 Female transferred from  [facility] center to [place] ED with report of sepsis, pneumonia, RYAN, patient on chronic trach collar , recent admission to [facility]  5/4/20 to 5/18/20 for hypoxia, pneumonia, COVID positive 04/27/20 ,developed hypoxia and was placed on respiratory support ,required ICU admission with shock and was placed on pressors  .Treated for UTI and pseudomonal pneumonia ,s/p cefepime ,had elevation of D-dimers .CTT was negative for PE , Doppler neg for DVT .Seen by neurologist for sz -like activity ,neurologist impression was that it was related to hypoxia CT Brain was negative for CVA .COVID  tested negative on 05/11 and 05/12  Patient had been sent to ER for respiratory distress ,associated with hypotension tachycardia ,tachypnea and diaphoresis  .Patient has a hx significant for Advanced dementia  ,Aphasic stroke  ,Constipation  ,COVID-19 virus detected  ,COPD ,Anxiety ,Dysphagia ,hyperkalemia ,pneumonia ,pulmonary edema , CVA (cerebral vascular accident)  ,Dementia of frontal lobe type  ,Diabetes mellitus  ,DM (diabetes mellitus)  ,GERD (gastroesophageal reflux disease)  ,HTN (hypertension)  ,Hypertension  ,Pneumonia  ,Quadriplegia  ,Respiratory failure  ,Feeding by G-tube  ,Gastrostomy in place  ,Hx of appendectomy  ,Tracheostomy in place  ,Tracheostomy tube present ,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 Admitted to ICU ,seen by pulm cons Dr Sandoval on admission to [place] ED ( transferred from [facility])and was admitted to ICU  for septic workup and evaluation ,send blood and urine cx, serial lactate levels ,monitor vitals closely hydration ,monitor urine output and renal profile ,iv abx initiated Palliative care consult requested ,to discuss advance directives and complete MOLST Patient was diagnosed with sepsis 2/2 to pneumonia and  called ER respiratory and  ICU ,patient  already received Cefepime 2gm iv, vancomycin  1gm iv, rectal Tylenol 650mg, LR x 1 liter, NS x 1 liter in [place] ED .Further plan of care as per pulmonologist Dr Sandoval and icu team

## 2020-06-05 NOTE — PROGRESS NOTE ADULT - ATTENDING COMMENTS
74F h/o frontotemporal dementia, chronic vent dependent respiratory failure, bedbound presenting with respiratory distress, diaphoresis and generalized spasm of extremities concerning for seizure activity vs sepsis without recurrent episodes in ICU. Lactate normalized to 1.1. Remains afebrile. Apnic with each attempt at SBTs. Miralax dc yesterday due to frequent loose BMs with improvement, can restart if needed. Hypernatremia resolved. FS remain controlled on Lantus 20units. Pt remains stable for discharge back to SNF/vent facilty. 74F h/o frontotemporal dementia, chronic vent dependent respiratory failure, bedbound presenting with respiratory distress, diaphoresis and generalized spasm of extremities concerning for seizure activity vs sepsis without recurrent episodes in ICU. Lactate normalized to 1.1. Remains afebrile. Apnic with each attempt at SBTs. Miralax dc yesterday due to frequent loose BMs with improvement, can restart if needed. Hypernatremia resolved. Increase Lantus to 25units. Pt remains stable for discharge back to SNF/vent facilty.

## 2020-06-05 NOTE — PROGRESS NOTE ADULT - SUBJECTIVE AND OBJECTIVE BOX
Patient is a 74y old  Female who presents with a chief complaint of RESPIRATORY DISTRESS (05 Jun 2020 07:11)    24 hour events: No acute overnight events.     REVIEW OF SYSTEMS  Unable to assess secondary to mental status.     T(F): 98.8 (06-05-20 @ 04:00), Max: 99.7 (06-04-20 @ 15:41)  HR: 62 (06-05-20 @ 07:42) (62 - 80)  BP: 155/65 (06-05-20 @ 07:00) (119/57 - 168/72)  RR: 14 (06-05-20 @ 07:00) (14 - 23)  SpO2: 100% (06-05-20 @ 07:42) (88% - 100%)  Wt(kg): --    Mode: AC/ CMV (Assist Control/ Continuous Mandatory Ventilation), RR (machine): 14, TV (machine): 420, FiO2: 30, PEEP: 5        I&O's Summary    06-04 @ 07:01  -  06-05 @ 07:00  --------------------------------------------------------  IN: 2300 mL / OUT: 800 mL / NET: 1500 mL      PHYSICAL EXAM  General:   CNS:   HEENT:   Resp:   CVS:   Abd:   Ext:   Skin:     MEDICATIONS      dextrose 40% Gel Oral PRN  dextrose 50% Injectable IV Push  dextrose 50% Injectable IV Push  dextrose 50% Injectable IV Push  glucagon  Injectable IntraMuscular PRN  insulin glargine Injectable (LANTUS) SubCutaneous  insulin lispro (HumaLOG) corrective regimen sliding scale SubCutaneous      fentaNYL   Patch  12 MICROgram(s)/Hr Transdermal      enoxaparin Injectable SubCutaneous    pantoprazole  Injectable IV Push  saline laxative (FLEET) Rectal Enema Rectal  saline laxative (FLEET) Rectal Enema Rectal PRN      dextrose 5%. IV Continuous      chlorhexidine 0.12% Liquid Oral Mucosa  chlorhexidine 2% Cloths Topical                            9.9    6.04  )-----------( 275      ( 05 Jun 2020 05:35 )             33.4       06-05    144  |  111<H>  |  19  ----------------------------<  164<H>  4.3   |  28  |  0.74    Ca    8.9      05 Jun 2020 05:35  Phos  2.9     06-05  Mg     2.5     06-05    TPro  6.6  /  Alb  2.5<L>  /  TBili  0.5  /  DBili  x   /  AST  13<L>  /  ALT  19  /  AlkPhos  74  06-05                    Radiology: ***  Bedside lung ultrasound: ***  Bedside ECHO: ***    CENTRAL LINE: Y/N          DATE INSERTED:              REMOVE: Y/N  REID: Y/N                        DATE INSERTED:              REMOVE: Y/N  A-LINE: Y/N                       DATE INSERTED:              REMOVE: Y/N    GLOBAL ISSUE/BEST PRACTICE  Analgesia:   Sedation:   CAM-ICU:   HOB elevation: yes  Stress ulcer prophylaxis:   VTE prophylaxis:   Glycemic control:   Nutrition:     CODE STATUS: ***  [facility] discussion: Y Patient is a 74y old  Female who presents with a chief complaint of RESPIRATORY DISTRESS (05 Jun 2020 07:11)    24 hour events: No acute overnight events.     REVIEW OF SYSTEMS  Unable to assess secondary to mental status.     T(F): 98.8 (06-05-20 @ 04:00), Max: 99.7 (06-04-20 @ 15:41)  HR: 62 (06-05-20 @ 07:42) (62 - 80)  BP: 155/65 (06-05-20 @ 07:00) (119/57 - 168/72)  RR: 14 (06-05-20 @ 07:00) (14 - 23)  SpO2: 100% (06-05-20 @ 07:42) (88% - 100%)  Wt(kg): --    Mode: AC/ CMV (Assist Control/ Continuous Mandatory Ventilation), RR (machine): 14, TV (machine): 420, FiO2: 30, PEEP: 5        I&O's Summary    06-04 @ 07:01  -  06-05 @ 07:00  --------------------------------------------------------  IN: 2300 mL / OUT: 800 mL / NET: 1500 mL      PHYSICAL EXAM  Gen: Chronically critically ill female, trach to vent  Neuro: Waxing and waning alertness, opens eyes to name, no eye tracking or following of commands  HEENT: PERRL, symmetric  Resp: Coarse breath sounds throughout lung fields anteriorly  CVS: RRR, +S1 and S2  Abd: soft, NTND  Ext: UE contracted in flexion at the shoulders, elbows, and hands b/l; b/l contracted distal LEs  Skin: WWP      MEDICATIONS      dextrose 40% Gel Oral PRN  dextrose 50% Injectable IV Push  dextrose 50% Injectable IV Push  dextrose 50% Injectable IV Push  glucagon  Injectable IntraMuscular PRN  insulin glargine Injectable (LANTUS) SubCutaneous  insulin lispro (HumaLOG) corrective regimen sliding scale SubCutaneous      fentaNYL   Patch  12 MICROgram(s)/Hr Transdermal      enoxaparin Injectable SubCutaneous    pantoprazole  Injectable IV Push  saline laxative (FLEET) Rectal Enema Rectal  saline laxative (FLEET) Rectal Enema Rectal PRN      dextrose 5%. IV Continuous      chlorhexidine 0.12% Liquid Oral Mucosa  chlorhexidine 2% Cloths Topical                            9.9    6.04  )-----------( 275      ( 05 Jun 2020 05:35 )             33.4       06-05    144  |  111<H>  |  19  ----------------------------<  164<H>  4.3   |  28  |  0.74    Ca    8.9      05 Jun 2020 05:35  Phos  2.9     06-05  Mg     2.5     06-05    TPro  6.6  /  Alb  2.5<L>  /  TBili  0.5  /  DBili  x   /  AST  13<L>  /  ALT  19  /  AlkPhos  74  06-05                    Radiology: ***  Bedside lung ultrasound: ***  Bedside ECHO: ***    CENTRAL LINE: Y/N          DATE INSERTED:              REMOVE: Y/N  REID: Y/N                        DATE INSERTED:              REMOVE: Y/N  A-LINE: Y/N                       DATE INSERTED:              REMOVE: Y/N    GLOBAL ISSUE/BEST PRACTICE  Analgesia:   Sedation:   CAM-ICU:   HOB elevation: yes  Stress ulcer prophylaxis:   VTE prophylaxis:   Glycemic control:   Nutrition:     CODE STATUS: ***  [facility] discussion: Y Patient is a 74y old  Female who presents with a chief complaint of RESPIRATORY DISTRESS (05 Jun 2020 07:11)    24 hour events: No acute overnight events.     REVIEW OF SYSTEMS  Unable to assess secondary to mental status.     T(F): 98.8 (06-05-20 @ 04:00), Max: 99.7 (06-04-20 @ 15:41)  HR: 62 (06-05-20 @ 07:42) (62 - 80)  BP: 155/65 (06-05-20 @ 07:00) (119/57 - 168/72)  RR: 14 (06-05-20 @ 07:00) (14 - 23)  SpO2: 100% (06-05-20 @ 07:42) (88% - 100%)  Wt(kg): --    Mode: AC/ CMV (Assist Control/ Continuous Mandatory Ventilation), RR (machine): 14, TV (machine): 420, FiO2: 30, PEEP: 5        I&O's Summary    06-04 @ 07:01  -  06-05 @ 07:00  --------------------------------------------------------  IN: 2300 mL / OUT: 800 mL / NET: 1500 mL      PHYSICAL EXAM  Gen: Chronically critically ill female, trach to vent  Neuro: Waxing and waning alertness, opens eyes to name, no eye tracking or following of commands  HEENT: PERRL, symmetric  Resp: Coarse breath sounds throughout lung fields anteriorly  CVS: RRR, +S1 and S2  Abd: soft, NTND  Ext: UE contracted in flexion at the shoulders, elbows, and hands b/l; b/l contracted distal LEs  Skin: WWP      MEDICATIONS      dextrose 40% Gel Oral PRN  dextrose 50% Injectable IV Push  dextrose 50% Injectable IV Push  dextrose 50% Injectable IV Push  glucagon  Injectable IntraMuscular PRN  insulin glargine Injectable (LANTUS) SubCutaneous  insulin lispro (HumaLOG) corrective regimen sliding scale SubCutaneous      fentaNYL   Patch  12 MICROgram(s)/Hr Transdermal      enoxaparin Injectable SubCutaneous    pantoprazole  Injectable IV Push  saline laxative (FLEET) Rectal Enema Rectal  saline laxative (FLEET) Rectal Enema Rectal PRN      dextrose 5%. IV Continuous      chlorhexidine 0.12% Liquid Oral Mucosa  chlorhexidine 2% Cloths Topical                            9.9    6.04  )-----------( 275      ( 05 Jun 2020 05:35 )             33.4       06-05    144  |  111<H>  |  19  ----------------------------<  164<H>  4.3   |  28  |  0.74    Ca    8.9      05 Jun 2020 05:35  Phos  2.9     06-05  Mg     2.5     06-05    TPro  6.6  /  Alb  2.5<L>  /  TBili  0.5  /  DBili  x   /  AST  13<L>  /  ALT  19  /  AlkPhos  74  06-05        CHRONIC REID      GLOBAL ISSUE/BEST PRACTICE:  Analgesia: N  Sedation: N  CAM-ICU: YULIYA  HOB elevation: Y  Stress ulcer prophylaxis: Y  VTE prophylaxis: Y  Glycemic control: Y  Nutrition: Y    CODE STATUS: FULL

## 2020-06-05 NOTE — DISCHARGE NOTE NURSING/CASE MANAGEMENT/SOCIAL WORK - NSDCPEPTSTRK_GEN_ALL_CORE
Normal vision: sees adequately in most situations; can see medication labels, newsprint
Stroke warning signs and symptoms/Need for follow up after discharge/Prescribed medications/Stroke education booklet/Call 911 for stroke/Signs and symptoms of stroke/Risk factors for stroke/Stroke support groups for patients, families, and friends

## 2020-06-05 NOTE — PROGRESS NOTE ADULT - REASON FOR ADMISSION
RESPIRATORY DISTRESS

## 2020-08-29 NOTE — H&P ADULT - PROBLEM SELECTOR PROBLEM 3
PT IRP Treatment    Primary Rehabilitation Diagnosis: Cardiac weakness  Expected Discharge Date: 9/4/2020  Planned Discharge Destination: Home    SUBJECTIVE: Subjective: Pt agreed to PT session, reports \" Im trying so hard and failing at everything. I feel like I cant breathe, I had the worse night ever. I wanted to refuse everything today until I saw the ENT, but I dont think they will actually send one up.  (08/29/20 0930)  Subjective/Objective Comments: Pt back in room at end of session with no LOB noted. Pt noted with very high anxiety, and tearful throughout session.  (08/29/20 0930)    OBJECTIVE:  Precautions  Other Precautions: abdominal precautions (08/28/20 1500)    See below for current functional status overview.  See PT flowsheet for full details regarding the PT therapy provided.    ASSESSMENT:   Treatment today focused on transfers, gait with 4ww, bed mobility, and supine LE strengthening.  Patient is demonstrating fair progress.    Patient limited at this time by fatigue, weakness, poor endurance and balance deficits.  Patient will benefit from further skilled PT  for continued training with gait, transfers and stairs to help the patient meet all functional goals.    PT Identified Barriers to Discharge: none with DC to IRP     This patient participated in all scheduled physical therapy time with this therapist today.    EDUCATION:   On this date, education was provided to patient regarding  bed mobility, transfers and ambulation  The response to education was/were: Needs reinforcement    PLAN:   Continue skilled PT, including the following Treatment/Interventions: Functional transfer training;Strengthening;ROM;Endurance training;Patient/Family training;Equipment eval/education;Bed mobility;Gait training;Compensatory technique education;Continued evaluation;Stairs retraining;Safety Education;Neuromuscular re-education (08/25/20 0830)   PT Frequency: 7 days/week (08/29/20 0930), Frequency Comments: 90  min/day at least 5 days/week (08/29/20 0930)    Treatment Plan for Next Session: progress bed mobility with reinforcement on log roll, progress transfers and short distance ambulation with 4 w/w, BLE strengthening in various planes, standing balance with decreasing UE support; pre-stair training progressing to step ups as able   Additional Plan Considerations: Pt very anxious and hard on self- try to keep routine and expectations clear  Plan Comments:      RECOMMENDATIONS FOR DISCHARGE:  Recommendation for Discharge: PT WI: Home, Home therapy    PT/OT Mobility Equipment for Discharge: pt has 4 w/w; would benefit from ramp (provided pt with handouts on where to obtain 8/26) - spouse to obtain (08/29/20 0930)  PT/OT ADL Equipment for Discharge: to be assessed (08/29/20 0701)      FUNCTIONAL DATA OVERVIEW LAST 24 HOURS  Bed Mobility   Bed Mobility  Supine to Sit: Supervision (Supv) (08/29/20 0930)  Sit to Supine: Supervision (Supv) (08/29/20 0930)  Bed Mobility Comments: Supervision for balance and safety with no LOB noted.  (08/29/20 0930)    Transfers  Transfers  Sit to Stand: Supervision (Supv) (08/29/20 0930)  Stand to Sit: Supervision (Supv) (08/29/20 0930)  Stand Pivot Transfers: Supervision (Supv) (08/29/20 0930)  Assistive Device/: 4-wheeled walker;Gait Belt (08/29/20 0930)  Transfer Comments 1: Supervision for balance and safety with no LOB noted  (08/29/20 0930)    Gait  Gait  Gait Assistance: Touching/Steadying Assistance (08/29/20 0930)  Assistive Device/: 4-wheeled walker;Gait Belt (08/29/20 0930)  Ambulation Distance (Feet): 30 Feet(x2 ) (08/29/20 0930)  Pattern: Shuffle (08/29/20 0930)  Ambulation Surface: Tile (08/29/20 0930)  Gait Comments 1: Touching Assist for balance and safety with no LOB noted.  (08/29/20 0930),      Stairs       Wheelchair Mobility       Balance       Neuromuscular Re-education       Other Therapeutic Interventions  Other Therapeutic Intervention:  Increased time spent with subjective listening with pt being very anxious this session. ADL os doffing/donning new gown due to blood on gown.  (08/29/20 3136)   Fever

## 2020-09-17 NOTE — ASU PATIENT PROFILE, ADULT - PRO INTERPRETER NEED 2
Please call-  I am not aware of any hospitals around that area that have the ability to do a fibroscan-he can call around if we would like and see   Unknown

## 2020-10-29 ENCOUNTER — EMERGENCY (EMERGENCY)
Facility: HOSPITAL | Age: 75
LOS: 1 days | Discharge: ANOTHER HOSPITAL | End: 2020-10-29
Attending: EMERGENCY MEDICINE | Admitting: EMERGENCY MEDICINE
Payer: MEDICARE

## 2020-10-29 VITALS
SYSTOLIC BLOOD PRESSURE: 203 MMHG | RESPIRATION RATE: 43 BRPM | HEIGHT: 64 IN | WEIGHT: 125 LBS | TEMPERATURE: 101 F | OXYGEN SATURATION: 100 % | DIASTOLIC BLOOD PRESSURE: 93 MMHG | HEART RATE: 130 BPM

## 2020-10-29 DIAGNOSIS — Z93.0 TRACHEOSTOMY STATUS: Chronic | ICD-10-CM

## 2020-10-29 DIAGNOSIS — Z93.1 GASTROSTOMY STATUS: Chronic | ICD-10-CM

## 2020-10-29 LAB
ALBUMIN SERPL ELPH-MCNC: 4 G/DL — SIGNIFICANT CHANGE UP (ref 3.3–5)
ALP SERPL-CCNC: 95 U/L — SIGNIFICANT CHANGE UP (ref 30–120)
ALT FLD-CCNC: 29 U/L DA — SIGNIFICANT CHANGE UP (ref 10–60)
ANION GAP SERPL CALC-SCNC: 18 MMOL/L — HIGH (ref 5–17)
APPEARANCE UR: ABNORMAL
APTT BLD: 43.7 SEC — HIGH (ref 27.5–35.5)
AST SERPL-CCNC: 24 U/L — SIGNIFICANT CHANGE UP (ref 10–40)
BACTERIA # UR AUTO: ABNORMAL
BASE EXCESS BLDA CALC-SCNC: -1.5 MMOL/L — SIGNIFICANT CHANGE UP (ref -2–2)
BASOPHILS # BLD AUTO: 0.08 K/UL — SIGNIFICANT CHANGE UP (ref 0–0.2)
BASOPHILS NFR BLD AUTO: 0.4 % — SIGNIFICANT CHANGE UP (ref 0–2)
BILIRUB SERPL-MCNC: 0.5 MG/DL — SIGNIFICANT CHANGE UP (ref 0.2–1.2)
BILIRUB UR-MCNC: NEGATIVE — SIGNIFICANT CHANGE UP
BLOOD GAS COMMENTS: 12 — SIGNIFICANT CHANGE UP
BLOOD GAS COMMENTS: 420 — SIGNIFICANT CHANGE UP
BLOOD GAS COMMENTS: 5 — SIGNIFICANT CHANGE UP
BLOOD GAS COMMENTS: AC — SIGNIFICANT CHANGE UP
BLOOD GAS COMMENTS: SIGNIFICANT CHANGE UP
BLOOD GAS SOURCE: SIGNIFICANT CHANGE UP
BUN SERPL-MCNC: 47 MG/DL — HIGH (ref 7–23)
CALCIUM SERPL-MCNC: 12.1 MG/DL — HIGH (ref 8.4–10.5)
CHLORIDE SERPL-SCNC: 104 MMOL/L — SIGNIFICANT CHANGE UP (ref 96–108)
CO2 SERPL-SCNC: 24 MMOL/L — SIGNIFICANT CHANGE UP (ref 22–31)
COLOR SPEC: YELLOW — SIGNIFICANT CHANGE UP
CREAT SERPL-MCNC: 2.29 MG/DL — HIGH (ref 0.5–1.3)
DIFF PNL FLD: ABNORMAL
EOSINOPHIL # BLD AUTO: 0.01 K/UL — SIGNIFICANT CHANGE UP (ref 0–0.5)
EOSINOPHIL NFR BLD AUTO: 0 % — SIGNIFICANT CHANGE UP (ref 0–6)
EPI CELLS # UR: SIGNIFICANT CHANGE UP
GLUCOSE SERPL-MCNC: 348 MG/DL — HIGH (ref 70–99)
GLUCOSE UR QL: NEGATIVE MG/DL — SIGNIFICANT CHANGE UP
HCO3 BLDA-SCNC: 23 MMOL/L — SIGNIFICANT CHANGE UP (ref 21–29)
HCT VFR BLD CALC: 43.1 % — SIGNIFICANT CHANGE UP (ref 34.5–45)
HGB BLD-MCNC: 13.2 G/DL — SIGNIFICANT CHANGE UP (ref 11.5–15.5)
HOROWITZ INDEX BLDA+IHG-RTO: 100 — SIGNIFICANT CHANGE UP
IMM GRANULOCYTES NFR BLD AUTO: 1.4 % — SIGNIFICANT CHANGE UP (ref 0–1.5)
INR BLD: 1.08 RATIO — SIGNIFICANT CHANGE UP (ref 0.88–1.16)
KETONES UR-MCNC: NEGATIVE — SIGNIFICANT CHANGE UP
LACTATE SERPL-SCNC: 8.7 MMOL/L — CRITICAL HIGH (ref 0.7–2)
LEUKOCYTE ESTERASE UR-ACNC: ABNORMAL
LYMPHOCYTES # BLD AUTO: 10.9 % — LOW (ref 13–44)
LYMPHOCYTES # BLD AUTO: 2.29 K/UL — SIGNIFICANT CHANGE UP (ref 1–3.3)
MCHC RBC-ENTMCNC: 27.6 PG — SIGNIFICANT CHANGE UP (ref 27–34)
MCHC RBC-ENTMCNC: 30.6 GM/DL — LOW (ref 32–36)
MCV RBC AUTO: 90 FL — SIGNIFICANT CHANGE UP (ref 80–100)
MONOCYTES # BLD AUTO: 1.36 K/UL — HIGH (ref 0–0.9)
MONOCYTES NFR BLD AUTO: 6.5 % — SIGNIFICANT CHANGE UP (ref 2–14)
NEUTROPHILS # BLD AUTO: 16.96 K/UL — HIGH (ref 1.8–7.4)
NEUTROPHILS NFR BLD AUTO: 80.8 % — HIGH (ref 43–77)
NITRITE UR-MCNC: NEGATIVE — SIGNIFICANT CHANGE UP
NRBC # BLD: 0 /100 WBCS — SIGNIFICANT CHANGE UP (ref 0–0)
OB PNL STL: NEGATIVE — SIGNIFICANT CHANGE UP
PCO2 BLDA: 39 MMHG — SIGNIFICANT CHANGE UP (ref 32–46)
PH BLD: 7.38 — SIGNIFICANT CHANGE UP (ref 7.35–7.45)
PH UR: 8 — SIGNIFICANT CHANGE UP (ref 5–8)
PLATELET # BLD AUTO: 332 K/UL — SIGNIFICANT CHANGE UP (ref 150–400)
PO2 BLDA: 459 MMHG — HIGH (ref 74–108)
POTASSIUM SERPL-MCNC: 4.2 MMOL/L — SIGNIFICANT CHANGE UP (ref 3.5–5.3)
POTASSIUM SERPL-SCNC: 4.2 MMOL/L — SIGNIFICANT CHANGE UP (ref 3.5–5.3)
PROT SERPL-MCNC: 9.2 G/DL — HIGH (ref 6–8.3)
PROT UR-MCNC: 100 MG/DL
PROTHROM AB SERPL-ACNC: 13 SEC — SIGNIFICANT CHANGE UP (ref 10.6–13.6)
RBC # BLD: 4.79 M/UL — SIGNIFICANT CHANGE UP (ref 3.8–5.2)
RBC # FLD: 14.3 % — SIGNIFICANT CHANGE UP (ref 10.3–14.5)
RBC CASTS # UR COMP ASSIST: ABNORMAL /HPF (ref 0–4)
SAO2 % BLDA: 99 % — HIGH (ref 92–96)
SODIUM SERPL-SCNC: 146 MMOL/L — HIGH (ref 135–145)
SP GR SPEC: 1.01 — SIGNIFICANT CHANGE UP (ref 1.01–1.02)
TRI-PHOS CRY UR QL COMP ASSIST: ABNORMAL
UROBILINOGEN FLD QL: NEGATIVE MG/DL — SIGNIFICANT CHANGE UP
WBC # BLD: 20.99 K/UL — HIGH (ref 3.8–10.5)
WBC # FLD AUTO: 20.99 K/UL — HIGH (ref 3.8–10.5)
WBC UR QL: ABNORMAL

## 2020-10-29 PROCEDURE — 93010 ELECTROCARDIOGRAM REPORT: CPT

## 2020-10-29 PROCEDURE — 99291 CRITICAL CARE FIRST HOUR: CPT

## 2020-10-29 PROCEDURE — 71045 X-RAY EXAM CHEST 1 VIEW: CPT | Mod: 26

## 2020-10-29 RX ORDER — SODIUM CHLORIDE 9 MG/ML
1750 INJECTION INTRAMUSCULAR; INTRAVENOUS; SUBCUTANEOUS ONCE
Refills: 0 | Status: COMPLETED | OUTPATIENT
Start: 2020-10-29 | End: 2020-10-29

## 2020-10-29 RX ORDER — VANCOMYCIN HCL 1 G
1000 VIAL (EA) INTRAVENOUS ONCE
Refills: 0 | Status: COMPLETED | OUTPATIENT
Start: 2020-10-29 | End: 2020-10-29

## 2020-10-29 RX ORDER — PIPERACILLIN AND TAZOBACTAM 4; .5 G/20ML; G/20ML
3.38 INJECTION, POWDER, LYOPHILIZED, FOR SOLUTION INTRAVENOUS ONCE
Refills: 0 | Status: COMPLETED | OUTPATIENT
Start: 2020-10-29 | End: 2020-10-29

## 2020-10-29 RX ORDER — CHLORHEXIDINE GLUCONATE 213 G/1000ML
15 SOLUTION TOPICAL EVERY 12 HOURS
Refills: 0 | Status: DISCONTINUED | OUTPATIENT
Start: 2020-10-29 | End: 2020-11-02

## 2020-10-29 RX ORDER — ACETAMINOPHEN 500 MG
650 TABLET ORAL ONCE
Refills: 0 | Status: COMPLETED | OUTPATIENT
Start: 2020-10-29 | End: 2020-10-29

## 2020-10-29 RX ADMIN — Medication 650 MILLIGRAM(S): at 23:40

## 2020-10-29 RX ADMIN — SODIUM CHLORIDE 1750 MILLILITER(S): 9 INJECTION INTRAMUSCULAR; INTRAVENOUS; SUBCUTANEOUS at 22:39

## 2020-10-29 RX ADMIN — Medication 250 MILLIGRAM(S): at 23:39

## 2020-10-29 RX ADMIN — PIPERACILLIN AND TAZOBACTAM 200 GRAM(S): 4; .5 INJECTION, POWDER, LYOPHILIZED, FOR SOLUTION INTRAVENOUS at 22:41

## 2020-10-29 RX ADMIN — PIPERACILLIN AND TAZOBACTAM 3.38 GRAM(S): 4; .5 INJECTION, POWDER, LYOPHILIZED, FOR SOLUTION INTRAVENOUS at 23:39

## 2020-10-29 RX ADMIN — Medication 650 MILLIGRAM(S): at 22:39

## 2020-10-29 NOTE — ED ADULT NURSE NOTE - NSIMPLEMENTINTERV_GEN_ALL_ED
Implemented All Fall with Harm Risk Interventions:  [place] to call system. Call bell, personal items and telephone within reach. Instruct patient to call for assistance. Room bathroom lighting operational. Non-slip footwear when patient is off stretcher. Physically safe environment: no spills, clutter or unnecessary equipment. Stretcher in lowest position, wheels locked, appropriate side rails in place. Provide visual cue, wrist band, yellow gown, etc. Monitor gait and stability. Monitor for mental status changes and reorient to person, place, and time. Review medications for side effects contributing to fall risk. Reinforce activity limits and safety measures with patient and family. Provide visual clues: red socks.

## 2020-10-29 NOTE — ED PROVIDER NOTE - OBJECTIVE STATEMENT
Patient sent from [facility] with respiratory distress. Onset uncertain. No report of fever or hypoxia. Patient trach'ed and non-verbal, unable to contribute to history

## 2020-10-29 NOTE — ED ADULT NURSE NOTE - OBJECTIVE STATEMENT
pt comes from [facility] for resp distress, pt is chronic vent patient, came in diaphoretic, febrile and tachycardic. pt non verbal at baseline, pt severely contracted x 4 limbs, pt has Peg tube in place as well. pt placed on vent with quick response in 02. pt hypotensive on CM. fluids iniated.

## 2020-10-30 ENCOUNTER — INPATIENT (INPATIENT)
Facility: HOSPITAL | Age: 75
LOS: 6 days | Discharge: NURSING, REHAB OR LONG-TERM CARE | DRG: 698 | End: 2020-11-06
Attending: INTERNAL MEDICINE | Admitting: INTERNAL MEDICINE
Payer: MEDICARE

## 2020-10-30 VITALS
RESPIRATION RATE: 14 BRPM | HEIGHT: 64 IN | TEMPERATURE: 98 F | SYSTOLIC BLOOD PRESSURE: 108 MMHG | DIASTOLIC BLOOD PRESSURE: 77 MMHG | WEIGHT: 160.06 LBS | OXYGEN SATURATION: 100 % | HEART RATE: 90 BPM

## 2020-10-30 VITALS
DIASTOLIC BLOOD PRESSURE: 52 MMHG | SYSTOLIC BLOOD PRESSURE: 97 MMHG | OXYGEN SATURATION: 100 % | HEART RATE: 80 BPM | RESPIRATION RATE: 12 BRPM

## 2020-10-30 DIAGNOSIS — J96.90 RESPIRATORY FAILURE, UNSPECIFIED, UNSPECIFIED WHETHER WITH HYPOXIA OR HYPERCAPNIA: ICD-10-CM

## 2020-10-30 DIAGNOSIS — Z93.0 TRACHEOSTOMY STATUS: Chronic | ICD-10-CM

## 2020-10-30 DIAGNOSIS — N17.9 ACUTE KIDNEY FAILURE, UNSPECIFIED: ICD-10-CM

## 2020-10-30 DIAGNOSIS — A41.9 SEPSIS, UNSPECIFIED ORGANISM: ICD-10-CM

## 2020-10-30 DIAGNOSIS — I10 ESSENTIAL (PRIMARY) HYPERTENSION: ICD-10-CM

## 2020-10-30 DIAGNOSIS — Z93.1 GASTROSTOMY STATUS: Chronic | ICD-10-CM

## 2020-10-30 DIAGNOSIS — G82.50 QUADRIPLEGIA, UNSPECIFIED: ICD-10-CM

## 2020-10-30 DIAGNOSIS — F03.90 UNSPECIFIED DEMENTIA WITHOUT BEHAVIORAL DISTURBANCE: ICD-10-CM

## 2020-10-30 DIAGNOSIS — E11.9 TYPE 2 DIABETES MELLITUS WITHOUT COMPLICATIONS: ICD-10-CM

## 2020-10-30 DIAGNOSIS — N39.0 URINARY TRACT INFECTION, SITE NOT SPECIFIED: ICD-10-CM

## 2020-10-30 DIAGNOSIS — Z51.5 ENCOUNTER FOR PALLIATIVE CARE: ICD-10-CM

## 2020-10-30 DIAGNOSIS — Z29.9 ENCOUNTER FOR PROPHYLACTIC MEASURES, UNSPECIFIED: ICD-10-CM

## 2020-10-30 PROBLEM — J18.9 PNEUMONIA, UNSPECIFIED ORGANISM: Chronic | Status: ACTIVE | Noted: 2020-05-28

## 2020-10-30 LAB
ALBUMIN SERPL ELPH-MCNC: 2.9 G/DL — LOW (ref 3.3–5)
ALP SERPL-CCNC: 77 U/L — SIGNIFICANT CHANGE UP (ref 40–120)
ALT FLD-CCNC: 22 U/L — SIGNIFICANT CHANGE UP (ref 12–78)
ANION GAP SERPL CALC-SCNC: 9 MMOL/L — SIGNIFICANT CHANGE UP (ref 5–17)
AST SERPL-CCNC: 28 U/L — SIGNIFICANT CHANGE UP (ref 15–37)
BILIRUB SERPL-MCNC: 0.3 MG/DL — SIGNIFICANT CHANGE UP (ref 0.2–1.2)
BUN SERPL-MCNC: 35 MG/DL — HIGH (ref 7–23)
CALCIUM SERPL-MCNC: 9 MG/DL — SIGNIFICANT CHANGE UP (ref 8.5–10.1)
CHLORIDE SERPL-SCNC: 115 MMOL/L — HIGH (ref 96–108)
CO2 SERPL-SCNC: 26 MMOL/L — SIGNIFICANT CHANGE UP (ref 22–31)
CREAT SERPL-MCNC: 1.4 MG/DL — HIGH (ref 0.5–1.3)
GLUCOSE SERPL-MCNC: 143 MG/DL — HIGH (ref 70–99)
HCT VFR BLD CALC: 37.2 % — SIGNIFICANT CHANGE UP (ref 34.5–45)
HGB BLD-MCNC: 11.6 G/DL — SIGNIFICANT CHANGE UP (ref 11.5–15.5)
LACTATE SERPL-SCNC: 3.5 MMOL/L — HIGH (ref 0.7–2)
LACTATE SERPL-SCNC: 3.8 MMOL/L — HIGH (ref 0.7–2)
LACTATE SERPL-SCNC: 7.2 MMOL/L — CRITICAL HIGH (ref 0.7–2)
MAGNESIUM SERPL-MCNC: 2.6 MG/DL — SIGNIFICANT CHANGE UP (ref 1.6–2.6)
MCHC RBC-ENTMCNC: 28 PG — SIGNIFICANT CHANGE UP (ref 27–34)
MCHC RBC-ENTMCNC: 31.2 GM/DL — LOW (ref 32–36)
MCV RBC AUTO: 89.6 FL — SIGNIFICANT CHANGE UP (ref 80–100)
NRBC # BLD: 0 /100 WBCS — SIGNIFICANT CHANGE UP (ref 0–0)
NT-PROBNP SERPL-SCNC: 1346 PG/ML — HIGH (ref 0–450)
PLATELET # BLD AUTO: 196 K/UL — SIGNIFICANT CHANGE UP (ref 150–400)
POTASSIUM SERPL-MCNC: 3.6 MMOL/L — SIGNIFICANT CHANGE UP (ref 3.5–5.3)
POTASSIUM SERPL-SCNC: 3.6 MMOL/L — SIGNIFICANT CHANGE UP (ref 3.5–5.3)
PROT SERPL-MCNC: 7.4 G/DL — SIGNIFICANT CHANGE UP (ref 6–8.3)
RBC # BLD: 4.15 M/UL — SIGNIFICANT CHANGE UP (ref 3.8–5.2)
RBC # FLD: 14.4 % — SIGNIFICANT CHANGE UP (ref 10.3–14.5)
SARS-COV-2 RNA SPEC QL NAA+PROBE: SIGNIFICANT CHANGE UP
SODIUM SERPL-SCNC: 150 MMOL/L — HIGH (ref 135–145)
TROPONIN I SERPL-MCNC: 0.07 NG/ML — HIGH (ref 0.01–0.04)
TROPONIN I SERPL-MCNC: 0.09 NG/ML — HIGH (ref 0.01–0.04)
TSH SERPL-MCNC: 1.27 UIU/ML — SIGNIFICANT CHANGE UP (ref 0.36–3.74)
WBC # BLD: 10.22 K/UL — SIGNIFICANT CHANGE UP (ref 3.8–10.5)
WBC # FLD AUTO: 10.22 K/UL — SIGNIFICANT CHANGE UP (ref 3.8–10.5)

## 2020-10-30 PROCEDURE — 94002 VENT MGMT INPAT INIT DAY: CPT

## 2020-10-30 PROCEDURE — 85730 THROMBOPLASTIN TIME PARTIAL: CPT

## 2020-10-30 PROCEDURE — 99291 CRITICAL CARE FIRST HOUR: CPT | Mod: 25

## 2020-10-30 PROCEDURE — 93005 ELECTROCARDIOGRAM TRACING: CPT

## 2020-10-30 PROCEDURE — 87186 SC STD MICRODIL/AGAR DIL: CPT

## 2020-10-30 PROCEDURE — 81001 URINALYSIS AUTO W/SCOPE: CPT

## 2020-10-30 PROCEDURE — 82803 BLOOD GASES ANY COMBINATION: CPT

## 2020-10-30 PROCEDURE — 85610 PROTHROMBIN TIME: CPT

## 2020-10-30 PROCEDURE — 83605 ASSAY OF LACTIC ACID: CPT

## 2020-10-30 PROCEDURE — 71045 X-RAY EXAM CHEST 1 VIEW: CPT

## 2020-10-30 PROCEDURE — 96365 THER/PROPH/DIAG IV INF INIT: CPT

## 2020-10-30 PROCEDURE — 99291 CRITICAL CARE FIRST HOUR: CPT

## 2020-10-30 PROCEDURE — 96375 TX/PRO/DX INJ NEW DRUG ADDON: CPT

## 2020-10-30 PROCEDURE — 87086 URINE CULTURE/COLONY COUNT: CPT

## 2020-10-30 PROCEDURE — 99284 EMERGENCY DEPT VISIT MOD MDM: CPT

## 2020-10-30 PROCEDURE — 82272 OCCULT BLD FECES 1-3 TESTS: CPT

## 2020-10-30 PROCEDURE — 36415 COLL VENOUS BLD VENIPUNCTURE: CPT

## 2020-10-30 PROCEDURE — 85025 COMPLETE CBC W/AUTO DIFF WBC: CPT

## 2020-10-30 PROCEDURE — 87040 BLOOD CULTURE FOR BACTERIA: CPT

## 2020-10-30 PROCEDURE — 80053 COMPREHEN METABOLIC PANEL: CPT

## 2020-10-30 PROCEDURE — 93010 ELECTROCARDIOGRAM REPORT: CPT

## 2020-10-30 RX ORDER — ALBUTEROL 90 UG/1
4 AEROSOL, METERED ORAL EVERY 6 HOURS
Refills: 0 | Status: DISCONTINUED | OUTPATIENT
Start: 2020-10-30 | End: 2020-11-06

## 2020-10-30 RX ORDER — CHLORHEXIDINE GLUCONATE 213 G/1000ML
1 SOLUTION TOPICAL
Refills: 0 | Status: DISCONTINUED | OUTPATIENT
Start: 2020-10-30 | End: 2020-10-30

## 2020-10-30 RX ORDER — DEXTROSE 50 % IN WATER 50 %
50 SYRINGE (ML) INTRAVENOUS ONCE
Refills: 0 | Status: COMPLETED | OUTPATIENT
Start: 2020-10-30 | End: 2020-10-30

## 2020-10-30 RX ORDER — GLUCAGON INJECTION, SOLUTION 0.5 MG/.1ML
1 INJECTION, SOLUTION SUBCUTANEOUS ONCE
Refills: 0 | Status: DISCONTINUED | OUTPATIENT
Start: 2020-10-30 | End: 2020-11-06

## 2020-10-30 RX ORDER — CALCIUM CARBONATE 500(1250)
1 TABLET ORAL
Refills: 0 | Status: DISCONTINUED | OUTPATIENT
Start: 2020-10-30 | End: 2020-10-30

## 2020-10-30 RX ORDER — TOBRAMYCIN SULFATE 40 MG/ML
300 VIAL (ML) INJECTION EVERY 12 HOURS
Refills: 0 | Status: DISCONTINUED | OUTPATIENT
Start: 2020-10-30 | End: 2020-11-02

## 2020-10-30 RX ORDER — DEXTROSE 50 % IN WATER 50 %
15 SYRINGE (ML) INTRAVENOUS ONCE
Refills: 0 | Status: DISCONTINUED | OUTPATIENT
Start: 2020-10-30 | End: 2020-11-06

## 2020-10-30 RX ORDER — PIPERACILLIN AND TAZOBACTAM 4; .5 G/20ML; G/20ML
3.38 INJECTION, POWDER, LYOPHILIZED, FOR SOLUTION INTRAVENOUS EVERY 8 HOURS
Refills: 0 | Status: DISCONTINUED | OUTPATIENT
Start: 2020-10-30 | End: 2020-10-30

## 2020-10-30 RX ORDER — ENOXAPARIN SODIUM 100 MG/ML
40 INJECTION SUBCUTANEOUS DAILY
Refills: 0 | Status: DISCONTINUED | OUTPATIENT
Start: 2020-10-31 | End: 2020-11-06

## 2020-10-30 RX ORDER — SODIUM CHLORIDE 9 MG/ML
1000 INJECTION, SOLUTION INTRAVENOUS
Refills: 0 | Status: DISCONTINUED | OUTPATIENT
Start: 2020-10-30 | End: 2020-11-06

## 2020-10-30 RX ORDER — ATROPINE SULFATE 1 %
1 DROPS OPHTHALMIC (EYE) THREE TIMES A DAY
Refills: 0 | Status: DISCONTINUED | OUTPATIENT
Start: 2020-10-30 | End: 2020-11-06

## 2020-10-30 RX ORDER — FENTANYL CITRATE 50 UG/ML
1 INJECTION INTRAVENOUS
Refills: 0 | Status: DISCONTINUED | OUTPATIENT
Start: 2020-10-30 | End: 2020-11-05

## 2020-10-30 RX ORDER — SODIUM CHLORIDE 9 MG/ML
1000 INJECTION, SOLUTION INTRAVENOUS
Refills: 0 | Status: DISCONTINUED | OUTPATIENT
Start: 2020-10-30 | End: 2020-11-01

## 2020-10-30 RX ORDER — CHLORHEXIDINE GLUCONATE 213 G/1000ML
1 SOLUTION TOPICAL DAILY
Refills: 0 | Status: DISCONTINUED | OUTPATIENT
Start: 2020-10-30 | End: 2020-11-06

## 2020-10-30 RX ORDER — PIPERACILLIN AND TAZOBACTAM 4; .5 G/20ML; G/20ML
3.38 INJECTION, POWDER, LYOPHILIZED, FOR SOLUTION INTRAVENOUS EVERY 8 HOURS
Refills: 0 | Status: DISCONTINUED | OUTPATIENT
Start: 2020-10-30 | End: 2020-11-02

## 2020-10-30 RX ORDER — INSULIN LISPRO 100/ML
VIAL (ML) SUBCUTANEOUS EVERY 6 HOURS
Refills: 0 | Status: DISCONTINUED | OUTPATIENT
Start: 2020-10-30 | End: 2020-11-06

## 2020-10-30 RX ORDER — SODIUM CHLORIDE 9 MG/ML
1000 INJECTION INTRAMUSCULAR; INTRAVENOUS; SUBCUTANEOUS ONCE
Refills: 0 | Status: COMPLETED | OUTPATIENT
Start: 2020-10-30 | End: 2020-10-30

## 2020-10-30 RX ORDER — ENOXAPARIN SODIUM 100 MG/ML
40 INJECTION SUBCUTANEOUS DAILY
Refills: 0 | Status: DISCONTINUED | OUTPATIENT
Start: 2020-10-30 | End: 2020-10-30

## 2020-10-30 RX ORDER — SUCRALFATE 1 G
1 TABLET ORAL
Refills: 0 | Status: DISCONTINUED | OUTPATIENT
Start: 2020-10-30 | End: 2020-11-06

## 2020-10-30 RX ORDER — ENOXAPARIN SODIUM 100 MG/ML
25 INJECTION SUBCUTANEOUS
Qty: 0 | Refills: 0 | DISCHARGE

## 2020-10-30 RX ORDER — IPRATROPIUM BROMIDE 0.2 MG/ML
4 SOLUTION, NON-ORAL INHALATION EVERY 6 HOURS
Refills: 0 | Status: DISCONTINUED | OUTPATIENT
Start: 2020-10-30 | End: 2020-11-06

## 2020-10-30 RX ORDER — ASPIRIN/CALCIUM CARB/MAGNESIUM 324 MG
300 TABLET ORAL DAILY
Refills: 0 | Status: DISCONTINUED | OUTPATIENT
Start: 2020-10-30 | End: 2020-10-30

## 2020-10-30 RX ORDER — SODIUM CHLORIDE 9 MG/ML
1000 INJECTION INTRAMUSCULAR; INTRAVENOUS; SUBCUTANEOUS
Refills: 0 | Status: DISCONTINUED | OUTPATIENT
Start: 2020-10-30 | End: 2020-10-30

## 2020-10-30 RX ORDER — LACTOBACILLUS ACIDOPHILUS 100MM CELL
1 CAPSULE ORAL
Refills: 0 | Status: DISCONTINUED | OUTPATIENT
Start: 2020-10-30 | End: 2020-11-06

## 2020-10-30 RX ORDER — SODIUM CHLORIDE 9 MG/ML
1000 INJECTION, SOLUTION INTRAVENOUS
Refills: 0 | Status: DISCONTINUED | OUTPATIENT
Start: 2020-10-30 | End: 2020-10-30

## 2020-10-30 RX ORDER — FAMOTIDINE 10 MG/ML
20 INJECTION INTRAVENOUS DAILY
Refills: 0 | Status: DISCONTINUED | OUTPATIENT
Start: 2020-10-30 | End: 2020-11-06

## 2020-10-30 RX ORDER — ASPIRIN/CALCIUM CARB/MAGNESIUM 324 MG
81 TABLET ORAL DAILY
Refills: 0 | Status: DISCONTINUED | OUTPATIENT
Start: 2020-10-30 | End: 2020-11-06

## 2020-10-30 RX ORDER — ENOXAPARIN SODIUM 100 MG/ML
30 INJECTION SUBCUTANEOUS DAILY
Refills: 0 | Status: DISCONTINUED | OUTPATIENT
Start: 2020-10-30 | End: 2020-10-30

## 2020-10-30 RX ORDER — PIPERACILLIN AND TAZOBACTAM 4; .5 G/20ML; G/20ML
3.38 INJECTION, POWDER, LYOPHILIZED, FOR SOLUTION INTRAVENOUS ONCE
Refills: 0 | Status: COMPLETED | OUTPATIENT
Start: 2020-10-30 | End: 2020-10-30

## 2020-10-30 RX ORDER — PSYLLIUM SEED (WITH DEXTROSE)
1 POWDER (GRAM) ORAL AT BEDTIME
Refills: 0 | Status: DISCONTINUED | OUTPATIENT
Start: 2020-10-30 | End: 2020-11-04

## 2020-10-30 RX ORDER — POTASSIUM CHLORIDE 20 MEQ
40 PACKET (EA) ORAL ONCE
Refills: 0 | Status: COMPLETED | OUTPATIENT
Start: 2020-10-30 | End: 2020-10-30

## 2020-10-30 RX ORDER — CEFTRIAXONE 500 MG/1
2000 INJECTION, POWDER, FOR SOLUTION INTRAMUSCULAR; INTRAVENOUS EVERY 24 HOURS
Refills: 0 | Status: DISCONTINUED | OUTPATIENT
Start: 2020-10-30 | End: 2020-10-30

## 2020-10-30 RX ORDER — ASPIRIN/CALCIUM CARB/MAGNESIUM 324 MG
325 TABLET ORAL DAILY
Refills: 0 | Status: DISCONTINUED | OUTPATIENT
Start: 2020-10-30 | End: 2020-10-30

## 2020-10-30 RX ADMIN — SODIUM CHLORIDE 75 MILLILITER(S): 9 INJECTION, SOLUTION INTRAVENOUS at 18:36

## 2020-10-30 RX ADMIN — Medication 1 GRAM(S): at 05:48

## 2020-10-30 RX ADMIN — Medication 40 MILLIEQUIVALENT(S): at 10:25

## 2020-10-30 RX ADMIN — FENTANYL CITRATE 1 PATCH: 50 INJECTION INTRAVENOUS at 10:21

## 2020-10-30 RX ADMIN — CHLORHEXIDINE GLUCONATE 1 APPLICATION(S): 213 SOLUTION TOPICAL at 11:49

## 2020-10-30 RX ADMIN — Medication 1 TABLET(S): at 05:48

## 2020-10-30 RX ADMIN — Medication 300 MILLIGRAM(S): at 11:49

## 2020-10-30 RX ADMIN — ALBUTEROL 4 PUFF(S): 90 AEROSOL, METERED ORAL at 20:36

## 2020-10-30 RX ADMIN — Medication 2: at 06:40

## 2020-10-30 RX ADMIN — Medication 1 GRAM(S): at 18:34

## 2020-10-30 RX ADMIN — Medication 4 PUFF(S): at 20:36

## 2020-10-30 RX ADMIN — ENOXAPARIN SODIUM 30 MILLIGRAM(S): 100 INJECTION SUBCUTANEOUS at 11:49

## 2020-10-30 RX ADMIN — PIPERACILLIN AND TAZOBACTAM 25 GRAM(S): 4; .5 INJECTION, POWDER, LYOPHILIZED, FOR SOLUTION INTRAVENOUS at 18:35

## 2020-10-30 RX ADMIN — Medication 1 TABLET(S): at 18:35

## 2020-10-30 RX ADMIN — CEFTRIAXONE 100 MILLIGRAM(S): 500 INJECTION, POWDER, FOR SOLUTION INTRAMUSCULAR; INTRAVENOUS at 03:31

## 2020-10-30 RX ADMIN — SODIUM CHLORIDE 1000 MILLILITER(S): 9 INJECTION INTRAMUSCULAR; INTRAVENOUS; SUBCUTANEOUS at 00:33

## 2020-10-30 RX ADMIN — Medication 50 MILLILITER(S): at 12:04

## 2020-10-30 RX ADMIN — SODIUM CHLORIDE 150 MILLILITER(S): 9 INJECTION, SOLUTION INTRAVENOUS at 03:31

## 2020-10-30 RX ADMIN — FAMOTIDINE 20 MILLIGRAM(S): 10 INJECTION INTRAVENOUS at 11:50

## 2020-10-30 RX ADMIN — FENTANYL CITRATE 1 PATCH: 50 INJECTION INTRAVENOUS at 21:23

## 2020-10-30 RX ADMIN — SODIUM CHLORIDE 1750 MILLILITER(S): 9 INJECTION INTRAMUSCULAR; INTRAVENOUS; SUBCUTANEOUS at 00:10

## 2020-10-30 RX ADMIN — PIPERACILLIN AND TAZOBACTAM 200 GRAM(S): 4; .5 INJECTION, POWDER, LYOPHILIZED, FOR SOLUTION INTRAVENOUS at 10:26

## 2020-10-30 RX ADMIN — Medication 50 MILLILITER(S): at 18:34

## 2020-10-30 NOTE — DIETITIAN INITIAL EVALUATION ADULT. - OTHER INFO
73 yo F PMH frontotemporal dementia, aphasic stroke, chronic trach & peg, bed bound, insulin dependent DM, covid in march, HTN, recent PNA admitted for severe sepsis 2/2 uti.  Unable to obtain subjective information from pt as nonverbal.  Wt hx Jan. 2019 125#, May 2020 129#.  Pt on Glucerna 1.5 1L/day PTA with fluid flushes.

## 2020-10-30 NOTE — ED ADULT NURSE NOTE - OBJECTIVE STATEMENT
75 y/o F patient presents to ED from [facility] for admission. As per EMS patient is from [facility] and was taken to [place] ED for respiratory distress. As per EMS patient was found tachypneic by staff. Patient on chronic vent. As per EMS patient was found febrile at [place] and hypotensive. Patient arrived to ED on ventilator, respiratory therapist bedside. patient non verbal. fentanyl patch noted to right upper chest. red scattered rash noted to forehead and cheeks. Patient placed on cardiac monitor. MD bedside.

## 2020-10-30 NOTE — H&P ADULT - PROBLEM SELECTOR PLAN 2
ventilator management as per icu team and pulm consult ,serial abgs and chest xrays ,head of bed elevated 30 degrees ,nebulized BD

## 2020-10-30 NOTE — PATIENT PROFILE ADULT - DO YOU LACK THE NECESSARY SUPPORT TO HELP YOU COPE WITH LIFE CHALLENGES?
TIA/ISCHEMIC/HEMORRHAGIC STROKE  Ischemic Stroke    YOUR STROKE RISK FACTORS INCLUDE:   It is important that you know your risk factors and that you work with your doctor on treatment and lifestyle changes to lower your risk of having a future stroke.     Personal Stroke Risks: Non-Modifable: Age over 55 years, Family history, Prior Stroke/TIA/AMI  Personal Stroke Risks: Modifiable: High blood pressure, Physical inactivity, Overweight/obesity, Diabetes    MEDICATIONS:  · See Discharge Medication List  · Take medications as they are scheduled, and talk with your physician if there are problems taking medications.  · Keep a list of medications up to date and carry with you at all times.      VACCINES:  Most Recent Immunizations   Administered Date(s) Administered   • Influenza, injectable, quadrivalent 10/31/2014   • Influenza, seasonal, injectable, trivalent 11/03/2016   • Pneumococcal Conjugate 13 valent 12/18/2015   • Pneumococcal polysaccharide, adult, 23 valent 01/10/2008   • Td:Adult type tetanus/diphtheria 01/10/2008   • Zoster Shingles 01/10/2008     Follow up with your doctor regarding influenza and/or pneumonia vaccine(s).    ACTIVITY:  Balance activity with rest    SMOKING:  · Avoid all tobacco products and second hand smoke.  Smoking Cessation Counseling offered.  [place] Toll Free Quit Line: [phone]    DIET:  Low fat/ low cholesterol    EDUCATION MATERIALS:  Stroke Folder    CALL 911 IF:  · Sudden onset of weakness or numbness of face, arms, legs especially on one side of the body  · Sudden and severe headache with no known cause or new dizziness or unsteadiness  · Sudden loss of or change in vision (in 1 or both eyes) and/ or speech or change in speech pattern  · Sudden change in behavior or ability to understand simple conversations or directions      Speech Therapy:  Diet Recommendation: Ground/Minced/Dysphagia 2, Thin Liquids  Recommended for of medications: whole in puree  Swallowing  Guidelines: Periodic liquid wash, Small bites/sips, Eat Slowly, Sit Upright   Recommend ongoing speech therapy following discharge:  Yes       yes

## 2020-10-30 NOTE — H&P ADULT - ATTENDING COMMENTS
73 yo Female sent from [facility] with respiratory distress. No report of fever or hypoxia. Patient trached/vented ,s/p peg  and non-verbal, unable to contribute to history. Transferred to [facility] from [place] ED for admission Admitted for septic workup and evaluation ,send blood and urine cx,serial lactate levels,monitor vitals closely hydration,monitor urine output and renal profile, iv abx initiated -s/p vanco and zosyn . BP stable upon [facility] ED arrival Med hx is significant for Advanced dementia ,s/p  Aphasic stroke  ,Constipation  ,COVID-19   ,CVA (cerebral vascular accident)  ,Dementia of frontal lobe type  ,Diabetes mellitus  ,DM (diabetes mellitus)  ,GERD (gastroesophageal reflux disease)  ,HTN (hypertension)  ,Hypertension  ,Pneumonia  ,Quadriplegia  ,Respiratory failure s/p tracheostomy and peg .Found to have RYAN ,hypernatremia and lactate elevated Admit for iv hydration,monitor renal profile and urine output,nutritionist consult,prealbumin level,serial bmp,nutritional supplements Palliative care consult requested ,to discuss advance directives and complete MOLST

## 2020-10-30 NOTE — H&P ADULT - ASSESSMENT
73 yo Female sent from [facility] with respiratory distress. No report of fever or hypoxia. Patient trached/vented ,s/p peg  and non-verbal, unable to contribute to history. Transferred to [facility] from [place] ED for admission Admitted for septic workup and evaluation ,send blood and urine cx,serial lactate levels,monitor vitals closely hydration,monitor urine output and renal profile, iv abx initiated -s/p vanco and zosyn . BP stable upon [facility] ED arrival Med hx is significant for Advanced dementia ,s/p  Aphasic stroke    Constipation  ,COVID-19   ,CVA (cerebral vascular accident)  ,Dementia of frontal lobe type  ,Diabetes mellitus  ,DM (diabetes mellitus)  ,GERD (gastroesophageal reflux disease)    HTN (hypertension)  ,Hypertension  ,Pneumonia  ,Quadriplegia  ,Respiratory failure s/p tracheostomy and peg .Found to have RYAN ,hypernatremia and lactate elevated Admit for iv hydration,monitor renal profile and urine output,nutritionist consult,prealbumin level,serial bmp,nutritional supplements Palliative care consult requested ,to discuss advance directives and complete MOLST

## 2020-10-30 NOTE — PROGRESS NOTE ADULT - SUBJECTIVE AND OBJECTIVE BOX
Patient is a 74y old  Female who presents with a chief complaint of urosepsis (30 Oct 2020 02:33)    24 hour events: ***    REVIEW OF SYSTEMS  Constitutional: No fever, chills, fatigue  Neuro: No headache, numbness, weakness  Resp: No cough, wheezing, shortness of breath  CVS: No chest pain, palpitations, leg swelling  GI: No abdominal pain, nausea, vomiting, diarrhea   : No dysuria, frequency, incontinence  Skin: No itching, burning, rashes, or lesions   Msk: No joint pain or swelling  Psych: No depression, anxiety, mood swings  Heme: No bleeding    T(F): 97 (10-30-20 @ 03:28), Max: 101.5 (10-29-20 @ 22:14)  HR: 70 (10-30-20 @ 07:00) (66 - 130)  BP: 114/51 (10-30-20 @ 06:00) (72/37 - 203/93)  RR: 14 (10-30-20 @ 07:00) (12 - 43)  SpO2: 100% (10-30-20 @ 07:00) (98% - 100%)  Wt(kg): --    Mode: AC/ CMV (Assist Control/ Continuous Mandatory Ventilation), RR (machine): 14, TV (machine): 400, FiO2: 40, PEEP: 5        I&O's Summary    10-29 @ 07:01  -  10-30 @ 07:00  --------------------------------------------------------  IN: 800 mL / OUT: 700 mL / NET: 100 mL      PHYSICAL EXAM  General:   CNS:   HEENT:   Resp:   CVS:   Abd:   Ext:   Skin:     MEDICATIONS  cefTRIAXone   IVPB IV Intermittent      dextrose 40% Gel Oral PRN  glucagon  Injectable IntraMuscular PRN  insulin lispro (ADMELOG) corrective regimen sliding scale SubCutaneous      aspirin Suppository Rectal  fentaNYL   Patch  12 MICROgram(s)/Hr Transdermal      enoxaparin Injectable SubCutaneous    famotidine    Tablet Oral  psyllium Powder Oral  sucralfate Oral      calcium carbonate 1250 mG  + Vitamin D (OsCal 500 + D) Oral  dextrose 5%. IV Continuous  lactated ringers. IV Continuous      atropine 1% Ophthalmic Solution for SubLingual Use SubLingual PRN  chlorhexidine 4% Liquid Topical    lactobacillus acidophilus Oral                          11.6   10.22 )-----------( 196      ( 30 Oct 2020 06:48 )             37.2       10-29    146<H>  |  104  |  47<H>  ----------------------------<  348<H>  4.2   |  24  |  2.29<H>    Ca    12.1<H>      29 Oct 2020 22:39    TPro  9.2<H>  /  Alb  4.0  /  TBili  0.5  /  DBili  x   /  AST  24  /  ALT  29  /  AlkPhos  95  10-29    Lactate 7.2           10-30 @ 00:22    Lactate 8.7           10-29 @ 22:39          PT/INR - ( 29 Oct 2020 22:39 )   PT: 13.0 sec;   INR: 1.08 ratio         PTT - ( 29 Oct 2020 22:39 )  PTT:43.7 sec  Urinalysis Basic - ( 29 Oct 2020 22:39 )    Color: Yellow / Appearance: Slightly Turbid / S.010 / pH: x  Gluc: x / Ketone: Negative  / Bili: Negative / Urobili: Negative mg/dL   Blood: x / Protein: 100 mg/dL / Nitrite: Negative   Leuk Esterase: Moderate / RBC: 6-10 /HPF / WBC 11-25   Sq Epi: x / Non Sq Epi: Few / Bacteria: Many            Radiology: ***  Bedside lung ultrasound: ***  Bedside ECHO: ***    CENTRAL LINE: Y/N          DATE INSERTED:              REMOVE: Y/N  REID: Y/N                        DATE INSERTED:              REMOVE: Y/N  A-LINE: Y/N                       DATE INSERTED:              REMOVE: Y/N    GLOBAL ISSUE/BEST PRACTICE  Analgesia:   Sedation:   CAM-ICU:   HOB elevation: yes  Stress ulcer prophylaxis:   VTE prophylaxis:   Glycemic control:   Nutrition:     CODE STATUS: ***  [facility] discussion: Y       Patient is a 74y old  Female who presents with a chief complaint of urosepsis (30 Oct 2020 02:33)    24 hour events: ***    REVIEW OF SYSTEMS  Constitutional: No fever, chills, fatigue  Neuro: No headache, numbness, weakness  Resp: No cough, wheezing, shortness of breath  CVS: No chest pain, palpitations, leg swelling  GI: No abdominal pain, nausea, vomiting, diarrhea   : No dysuria, frequency, incontinence  Skin: No itching, burning, rashes, or lesions   Msk: No joint pain or swelling  Psych: No depression, anxiety, mood swings  Heme: No bleeding    T(F): 97 (10-30-20 @ 03:28), Max: 101.5 (10-29-20 @ 22:14)  HR: 70 (10-30-20 @ 07:00) (66 - 130)  BP: 114/51 (10-30-20 @ 06:00) (72/37 - 203/93)  RR: 14 (10-30-20 @ 07:) (12 - 43)  SpO2: 100% (10-30-20 @ 07:) (98% - 100%)  Wt(kg): --    Mode: AC/ CMV (Assist Control/ Continuous Mandatory Ventilation), RR (machine): 14, TV (machine): 400, FiO2: 40, PEEP: 5        I&O's Summary    10-29 @ 07:01  -  10-30 @ 07:00  --------------------------------------------------------  IN: 800 mL / OUT: 700 mL / NET: 100 mL      PHYSICAL EXAM  General: frail appearing womand, intubated, trached  CNS: contracted, bedbound, does not interact, withdraws from painful stimuli  HEENT: PERRL  Resp: clear to auscultation bilaterally  CVS: rrr, no m/r/g  Abd: soft, suprapubic tenderness, peg site clean  Ext: no extremity edema, 2+ pulses in extremities  Skin:     MEDICATIONS  cefTRIAXone   IVPB IV Intermittent      dextrose 40% Gel Oral PRN  glucagon  Injectable IntraMuscular PRN  insulin lispro (ADMELOG) corrective regimen sliding scale SubCutaneous      aspirin Suppository Rectal  fentaNYL   Patch  12 MICROgram(s)/Hr Transdermal      enoxaparin Injectable SubCutaneous    famotidine    Tablet Oral  psyllium Powder Oral  sucralfate Oral      calcium carbonate 1250 mG  + Vitamin D (OsCal 500 + D) Oral  dextrose 5%. IV Continuous  lactated ringers. IV Continuous      atropine 1% Ophthalmic Solution for SubLingual Use SubLingual PRN  chlorhexidine 4% Liquid Topical    lactobacillus acidophilus Oral                          11.6   10.22 )-----------( 196      ( 30 Oct 2020 06:48 )             37.2       10-29    146<H>  |  104  |  47<H>  ----------------------------<  348<H>  4.2   |  24  |  2.29<H>    Ca    12.1<H>      29 Oct 2020 22:39    TPro  9.2<H>  /  Alb  4.0  /  TBili  0.5  /  DBili  x   /  AST  24  /  ALT  29  /  AlkPhos  95  10-29    Lactate 7.2           10-30 @ 00:22    Lactate 8.7           10-29 @ 22:39          PT/INR - ( 29 Oct 2020 22:39 )   PT: 13.0 sec;   INR: 1.08 ratio         PTT - ( 29 Oct 2020 22:39 )  PTT:43.7 sec  Urinalysis Basic - ( 29 Oct 2020 22:39 )    Color: Yellow / Appearance: Slightly Turbid / S.010 / pH: x  Gluc: x / Ketone: Negative  / Bili: Negative / Urobili: Negative mg/dL   Blood: x / Protein: 100 mg/dL / Nitrite: Negative   Leuk Esterase: Moderate / RBC: 6-10 /HPF / WBC 11-25   Sq Epi: x / Non Sq Epi: Few / Bacteria: Many            Radiology: ***  Bedside lung ultrasound: ***  Bedside ECHO: ***    CENTRAL LINE: Y/N          DATE INSERTED:              REMOVE: Y/N  REID: Y/N                        DATE INSERTED:              REMOVE: Y/N  A-LINE: Y/N                       DATE INSERTED:              REMOVE: Y/N    GLOBAL ISSUE/BEST PRACTICE  Analgesia:   Sedation:   CAM-ICU:   HOB elevation: yes  Stress ulcer prophylaxis:   VTE prophylaxis:   Glycemic control:   Nutrition:     CODE STATUS: ***  [facility] discussion: Y       Patient is a 74y old  Female who presents with a chief complaint of urosepsis (30 Oct 2020 02:33)    brief hospital course:   73 yo F h/o frontotemporal dementia, aphasic stroke, chronic trach & peg, bed bound, insulin dependent DM, HTN, recent admission for PNA, admitted for urosepsis and lactic acidosis. Received 1L LR, 1x ceftriaxone. Lactate downtrended from 8.7 -> 3.8 overnight. antibiotic coverage switched from ceftriaxone to zosyn. Received 1 amp d50 for hypoglycemia    REVIEW OF SYSTEMS  unable to assess 2/2 neurological status    T(F): 97 (10-30-20 @ 03:28), Max: 101.5 (10-29-20 @ 22:14)  HR: 70 (10-30-20 @ 07:00) (66 - 130)  BP: 114/51 (10-30-20 @ 06:00) (72/37 - 203/93)  RR: 14 (10-30-20 @ 07:00) (12 - 43)  SpO2: 100% (10-30-20 @ 07:00) (98% - 100%)  Wt(kg): --    Mode: AC/ CMV (Assist Control/ Continuous Mandatory Ventilation), RR (machine): 14, TV (machine): 400, FiO2: 40, PEEP: 5        I&O's Summary    10-29 @ 07:01  -  10-30 @ 07:00  --------------------------------------------------------  IN: 800 mL / OUT: 700 mL / NET: 100 mL      PHYSICAL EXAM  General: frail appearing woman, intubated, trached  CNS: contracted, bedbound, does not interact/ follow commands, withdraws from painful stimuli  HEENT: PERRL  Resp: clear to auscultation bilaterally  CVS: rrr, no m/r/g  Abd: soft, suprapubic tenderness, peg site clean  Ext: no extremity edema, 2+ pulses in extremities  Skin: warm & perfused, no rashes    MEDICATIONS  cefTRIAXone   IVPB IV Intermittent  dextrose 40% Gel Oral PRN  glucagon  Injectable IntraMuscular PRN  insulin lispro (ADMELOG) corrective regimen sliding scale SubCutaneous  aspirin Suppository Rectal  fentaNYL   Patch  12 MICROgram(s)/Hr Transdermal  enoxaparin Injectable SubCutaneous  famotidine    Tablet Oral  psyllium Powder Oral  sucralfate Oral  calcium carbonate 1250 mG  + Vitamin D (OsCal 500 + D) Oral  dextrose 5%. IV Continuous  lactated ringers. IV Continuous  atropine 1% Ophthalmic Solution for SubLingual Use SubLingual PRN  chlorhexidine 4% Liquid Topical  lactobacillus acidophilus Oral                                     11.6   10. )-----------( 196      ( 30 Oct 2020 06:48 )             37.2              10    150<H>  |  115<H>  |  35<H>  ----------------------------<  143<H>  3.6   |  26  |  1.40<H>    Ca    9.0      30 Oct 2020 06:48  Mg     2.6     10-30    TPro  7.4  /  Alb  2.9<L>  /  TBili  0.3  /  DBili  x   /  AST  28  /  ALT  22  /  AlkPhos  77  10-30      Lactate 3.8           10-30 @ 6:49       Lactate 7.2           10-30 @ 00:22    Lactate 8.7           10-29 @ 22:39    Serum Pro-Brain Natriuretic Peptide: 1346 pg/mL   Troponin I, Serum: .086:     PT/INR - ( 29 Oct 2020 22:39 )   PT: 13.0 sec;   INR: 1.08 ratio         PTT - ( 29 Oct 2020 22:39 )  PTT:43.7 sec  Urinalysis Basic - ( 29 Oct 2020 22:39 )    Color: Yellow / Appearance: Slightly Turbid / S.010 / pH: x  Gluc: x / Ketone: Negative  / Bili: Negative / Urobili: Negative mg/dL   Blood: x / Protein: 100 mg/dL / Nitrite: Negative   Leuk Esterase: Moderate / RBC: 6-10 /HPF / WBC 11-25   Sq Epi: x / Non Sq Epi: Few / Bacteria: Many            Radiology: < from: Xray Chest 1 View- PORTABLE-Urgent (10.29.20 @ 23:22) >  IMPRESSION:  Tracheostomy tube again noted.    Nonspecific mild opacity medial right lung base. Follow-up recommended.    No pleural effusion.    Heart size cannot be accurately assessed in this projection.    < end of copied text >        REID: Y                       DATE INSERTED: 10/30/2020     GLOBAL ISSUE/BEST PRACTICE  Analgesia: Y  Sedation: N  HOB elevation: yes  Stress ulcer prophylaxis: Y  VTE prophylaxis: LVX  Glycemic control: Y  Nutrition: Glucerna feeds    CODE STATUS: ***  [facility] discussion: Y       Patient is a 74y old  Female who presents with a chief complaint of urosepsis (30 Oct 2020 02:33)    brief hospital course:   75 yo F h/o frontotemporal dementia, aphasic stroke, chronic trach & peg, bed bound, insulin dependent DM, HTN, recent admission for PNA, admitted for urosepsis and lactic acidosis. Received 1L LR, 1x ceftriaxone. Lactate downtrended from 8.7 -> 3.8 overnight. antibiotic coverage switched from ceftriaxone to zosyn. Received 1 amp d50 for hypoglycemia    REVIEW OF SYSTEMS  unable to assess 2/2 neurological status    T(F): 97 (10-30-20 @ 03:28), Max: 101.5 (10-29-20 @ 22:14)  HR: 70 (10-30-20 @ 07:00) (66 - 130)  BP: 114/51 (10-30-20 @ 06:00) (72/37 - 203/93)  RR: 14 (10-30-20 @ 07:00) (12 - 43)  SpO2: 100% (10-30-20 @ 07:00) (98% - 100%)    Mode: AC/ CMV (Assist Control/ Continuous Mandatory Ventilation), RR (machine): 14, TV (machine): 400, FiO2: 40, PEEP: 5        I&O's Summary    10-29 @ 07:01  -  10-30 @ 07:00  --------------------------------------------------------  IN: 800 mL / OUT: 700 mL / NET: 100 mL      PHYSICAL EXAM  General: frail appearing woman, intubated, trached  CNS: contracted, bedbound, does not interact/ follow commands, withdraws from painful stimuli  HEENT: PERRL  Resp: clear to auscultation bilaterally  CVS: rrr, no m/r/g  Abd: soft, suprapubic tenderness, peg site clean  Ext: no extremity edema, 2+ pulses in extremities  Skin: warm & perfused, no rashes    MEDICATIONS  cefTRIAXone   IVPB IV Intermittent  dextrose 40% Gel Oral PRN  glucagon  Injectable IntraMuscular PRN  insulin lispro (ADMELOG) corrective regimen sliding scale SubCutaneous  aspirin Suppository Rectal  fentaNYL   Patch  12 MICROgram(s)/Hr Transdermal  enoxaparin Injectable SubCutaneous  famotidine    Tablet Oral  psyllium Powder Oral  sucralfate Oral  calcium carbonate 1250 mG  + Vitamin D (OsCal 500 + D) Oral  dextrose 5%. IV Continuous  lactated ringers. IV Continuous  atropine 1% Ophthalmic Solution for SubLingual Use SubLingual PRN  chlorhexidine 4% Liquid Topical  lactobacillus acidophilus Oral                                     11.6   10. )-----------( 196      ( 30 Oct 2020 06:48 )             37.2              10    150<H>  |  115<H>  |  35<H>  ----------------------------<  143<H>  3.6   |  26  |  1.40<H>    Ca    9.0      30 Oct 2020 06:48  Mg     2.6     10-30    TPro  7.4  /  Alb  2.9<L>  /  TBili  0.3  /  DBili  x   /  AST  28  /  ALT  22  /  AlkPhos  77  10-30      Lactate 3.8           10-30 @ 6:49       Lactate 7.2           10-30 @ 00:22    Lactate 8.7           10-29 @ 22:39    Serum Pro-Brain Natriuretic Peptide: 1346 pg/mL   Troponin I, Serum: .086:     PT/INR - ( 29 Oct 2020 22:39 )   PT: 13.0 sec;   INR: 1.08 ratio         PTT - ( 29 Oct 2020 22:39 )  PTT:43.7 sec  Urinalysis Basic - ( 29 Oct 2020 22:39 )    Color: Yellow / Appearance: Slightly Turbid / S.010 / pH: x  Gluc: x / Ketone: Negative  / Bili: Negative / Urobili: Negative mg/dL   Blood: x / Protein: 100 mg/dL / Nitrite: Negative   Leuk Esterase: Moderate / RBC: 6-10 /HPF / WBC 11-25   Sq Epi: x / Non Sq Epi: Few / Bacteria: Many            Radiology: < from: Xray Chest 1 View- PORTABLE-Urgent (10.29.20 @ 23:22) >  IMPRESSION:  Tracheostomy tube again noted.    Nonspecific mild opacity medial right lung base. Follow-up recommended.    No pleural effusion.    Heart size cannot be accurately assessed in this projection.    < end of copied text >        FRANKLIN: Y                       DATE INSERTED: 10/30/2020     GLOBAL ISSUE/BEST PRACTICE  Analgesia: Y  Sedation: N  HOB elevation: yes  Stress ulcer prophylaxis: Y  VTE prophylaxis: LVX  Glycemic control: Y  Nutrition: Glucerna feeds    CODE STATUS: ***  [facility] discussion: Y

## 2020-10-30 NOTE — H&P ADULT - RS GEN PE MLT RESP DETAILS PC
breath sounds equal/diminished breath sounds, R/diminished breath sounds, L/respirations non-labored/good air movement/airway patent
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
## 2020-10-30 NOTE — ED PROVIDER NOTE - CLINICAL SUMMARY MEDICAL DECISION MAKING FREE TEXT BOX
75 yo female sent from [facility] with respiratory distress. Onset uncertain. No report of fever or hypoxia. Patient trached/vented and non-verbal, unable to contribute to history.    transferred from [place] for ICU admission for urosepsis, IV vanco/zosyn given, Dr. Roca covering for Dr. Tejeda, ICU admitting, BP stable upon [place] ED arrival

## 2020-10-30 NOTE — ED PROVIDER NOTE - PHYSICAL EXAMINATION
Gen: awake, trached/vented nonverbal  Head/eyes: NC/AT  ENT: trached/vented  Neck: supple  Pulm/lung: Bilateral clear BS, normal resp effort on vent, no wheeze  CV/heart: RRR, no M/R/G, +2 dist pulses (radial, pedal DP/PT, popliteal)  GI/Abd: soft, NT/ND, +BS, no guarding/rebound tenderness  Musculoskeletal: contractures noted in all extremities  Skin: no rash, no vesicles, no petechaie, no ecchymosis, no swelling  Neuro: awake, trached/vented Gen: awake, trached/vented nonverbal  Head/eyes: NC/AT  ENT: trached/vented  Neck: supple  Pulm/lung: Bilateral clear BS, normal resp effort on vent, no wheeze  CV/heart: RRR, no M/R/G, +2 dist pulses (radial, pedal DP/PT, popliteal)  GI/Abd: soft, NT/ND, +BS, no guarding/rebound tenderness, peg tube in place  : vaughn in place  Musculoskeletal: contractures noted in all extremities  Skin: no rash, no vesicles, no petechaie, no ecchymosis, no swelling  Neuro: awake, trached/vented

## 2020-10-30 NOTE — PROGRESS NOTE ADULT - ASSESSMENT
75 yo F PMH frontotemporal dementia, aphasic stroke, chronic trach & peg, bed bound, insulin dependent DM, covid in march, HTN, recent PNA admitted for severe sepsis 2/2 uti    Neuro: no acute issues. c/w transdermal fentanyl patch  Cardio: hemodynamically stable. f/u TTE  Pulm: continue with mechanical ventilation. CXR clear  GI:  Renal: RYAN. Hypernatremia  ID: urosepsis - continue rocephin, f/u urine sensitivities (grew proteus in past), f/u blood cultures  Endo:  heme: LVX DVT ppx 73 yo F PMH frontotemporal dementia, aphasic stroke, chronic trach & peg, bed bound, insulin dependent DM, covid in march, HTN, recent PNA admitted for severe sepsis 2/2 uti    Neuro: no acute issues. c/w transdermal fentanyl patch  Cardio: hemodynamically stable. f/u TTE in AM. ASA 81 daily through peg tube. Strict Is/Os  Pulm: continue with mechanical ventilation. CXR clear. tobramycin 300mg q12, albuterol & ipatropium 4 puffs q6 for secretions as pt had high peak pressures this AM  GI: glucerna feeds through peg. 250 cc free water q6 for hypernatremia. c/w fleet enema, famotidine, psyllium, sucralfate, and lactobillus  Renal: prerenal RYAN 2/2 sepsis with lactica acidosis improving with fluid administration. Will c/w IVF and continue to trend lactate & renal indices  ID: urosepsis - on zosyn 3.375 q8, f/u urine sensitivities (grew proteus in past), f/u blood cultures, sputum ulture  Endo: hyoglycemia likey 2/2 home basal insulin & NPO status. ISS for insulin dependent diabetes  heme: 40 mg SQ LVX DVT ppx 75 yo F PMH frontotemporal dementia, aphasic stroke, chronic trach & peg, bed bound, insulin dependent DM, covid in march, HTN, recent PNA admitted for severe sepsis 2/2 uti    Neuro: no acute issues. c/w transdermal fentanyl patch  Cardio: hemodynamically stable. f/u TTE in AM. ASA 81 daily through peg tube. Strict Is/Os  Pulm: continue with mechanical ventilation. CXR clear. tobramycin 300mg q12, albuterol & ipatropium 4 puffs q6 for secretions as pt had high peak pressures this AM  GI: glucerna feeds through peg. 250 cc free water q6 for hypernatremia. c/w fleet enema, famotidine, psyllium, sucralfate, and lactobillus  Renal: prerenal RYAN 2/2 sepsis with lactic acidosis improving with fluid administration. Will c/w IVF and continue to trend lactate & renal indices  ID: urosepsis - on zosyn 3.375 q8, f/u urine sensitivities (grew proteus in past), f/u blood cultures, sputum culture  Endo: hyoglycemia likely 2/2 home basal insulin & NPO status. ISS for insulin dependent diabetes  heme: 40 mg SQ LVX DVT ppx

## 2020-10-30 NOTE — ED ADULT NURSE NOTE - NSIMPLEMENTINTERV_GEN_ALL_ED
Implemented All Fall with Harm Risk Interventions:  [place] to call system. Call bell, personal items and telephone within reach. Instruct patient to call for assistance. Room bathroom lighting operational. Non-slip footwear when patient is off stretcher. Physically safe environment: no spills, clutter or unnecessary equipment. Stretcher in lowest position, wheels locked, appropriate side rails in place. Provide visual cue, wrist band, yellow gown, etc. Monitor gait and stability. Monitor for mental status changes and reorient to person, place, and time. Review medications for side effects contributing to fall risk. Reinforce activity limits and safety measures with patient and family. Provide visual clues: red socks.

## 2020-10-30 NOTE — H&P ADULT - PROBLEM SELECTOR PLAN 3
Admitted for septic workup and evaluation,send blood and urine cx,serial lactate levels,monitor vitals closley,ivfs hydration,monitor urine output and renal profile,iv abx initiated -ON ZOSYN ,id cons called

## 2020-10-30 NOTE — H&P ADULT - PROBLEM SELECTOR PLAN 6
seen by cardiologist Admitted  to intensive care unit for monitoring , send 3 sets of cardiac enzymes to rule out acute coronary event, obtain ECHO to evaluate LVEF, cardiology consult  ,continue current management, O2 supply, anticoagulation plan as per cardiology consult

## 2020-10-30 NOTE — CONSULT NOTE ADULT - SUBJECTIVE AND OBJECTIVE BOX
BREA BECKHAM    [facility] ICU1 18    Patient is a 74y old  Female who presents with a chief complaint of urosepsis (30 Oct 2020 02:33)       Allergies    codeine (Hives)    Intolerances        HPI:  75 yo Female sent from [facility] with respiratory distress. No report of fever or hypoxia. Patient trached/vented ,s/p peg  and non-verbal, unable to contribute to history. Transferred to [place] from [place] ED for admission Admitted for septic workup and evaluation ,send blood and urine cx,serial lactate levels,monitor vitals closely hydration,monitor urine output and renal profile, iv abx initiated -s/p vanco and zosyn . BP stable upon [place] ED arrival Med hx is significant for Advanced dementia ,s/p  Aphasic stroke    Constipation  ,COVID-19   ,CVA (cerebral vascular accident)  ,Dementia of frontal lobe type  ,Diabetes mellitus  ,DM (diabetes mellitus)  ,GERD (gastroesophageal reflux disease)    HTN (hypertension)  ,Hypertension  ,Pneumonia  ,Quadriplegia  ,Respiratory failure ,s/p tracheostomy and peg .Found to have RYAN ,hypernatremia and lactate elevated Admit for iv hydration,monitor renal profile and urine output,nutritionist consult,prealbumin level,serial bmp,nutritional supplements, Palliative care consult requested ,to discuss advance directives and complete MOLST  (30 Oct 2020 07:14)      PAST MEDICAL & SURGICAL HISTORY:  Pneumonia    Quadriplegia    COVID-19 virus detected    Advanced dementia    DM (diabetes mellitus)    HTN (hypertension)    CVA (cerebral vascular accident)    Respiratory failure    Constipation    GERD (gastroesophageal reflux disease)    Hypertension    Respiratory failure    Diabetes mellitus    Aphasic stroke    Dementia of frontal lobe type    Feeding by G-tube    Tracheostomy tube present    Tracheostomy in place    Gastrostomy in place    Hx of appendectomy        FAMILY HISTORY:  No pertinent family history in first degree relatives          MEDICATIONS   aspirin Suppository 300 milliGRAM(s) Rectal daily  atropine 1% Ophthalmic Solution for SubLingual Use 1 Drop(s) SubLingual three times a day PRN  calcium carbonate 1250 mG  + Vitamin D (OsCal 500 + D) 1 Tablet(s) Oral two times a day  chlorhexidine 2% Cloths 1 Application(s) Topical daily  dextrose 40% Gel 15 Gram(s) Oral once PRN  dextrose 5%. 1000 milliLiter(s) IV Continuous <Continuous>  enoxaparin Injectable 30 milliGRAM(s) SubCutaneous daily  famotidine    Tablet 20 milliGRAM(s) Oral daily  fentaNYL   Patch  12 MICROgram(s)/Hr 1 Patch Transdermal every 72 hours  glucagon  Injectable 1 milliGRAM(s) IntraMuscular once PRN  insulin lispro (ADMELOG) corrective regimen sliding scale   SubCutaneous every 6 hours  lactated ringers. 1000 milliLiter(s) IV Continuous <Continuous>  lactobacillus acidophilus 1 Tablet(s) Oral two times a day  piperacillin/tazobactam IVPB. 3.375 Gram(s) IV Intermittent once  piperacillin/tazobactam IVPB.. 3.375 Gram(s) IV Intermittent every 8 hours  potassium chloride   Powder 40 milliEquivalent(s) Enteral Tube once  psyllium Powder 1 Packet(s) Oral at bedtime  sucralfate 1 Gram(s) Oral two times a day      Vital Signs Last 24 Hrs  T(C): 36.4 (30 Oct 2020 07:35), Max: 38.6 (29 Oct 2020 22:14)  T(F): 97.6 (30 Oct 2020 07:35), Max: 101.5 (29 Oct 2020 22:14)  HR: 72 (30 Oct 2020 08:00) (66 - 130)  BP: 119/55 (30 Oct 2020 08:00) (72/37 - 203/93)  BP(mean): 79 (30 Oct 2020 08:00) (73 - 79)  RR: 20 (30 Oct 2020 08:00) (12 - 43)  SpO2: 100% (30 Oct 2020 08:00) (98% - 100%)      10-29-20 @ 07:  -  10-30-20 @ 07:00  --------------------------------------------------------  IN: 800 mL / OUT: 700 mL / NET: 100 mL    10-30-20 @ 07:  -  10-30-20 @ 09:27  --------------------------------------------------------  IN: 300 mL / OUT: 170 mL / NET: 130 mL        Mode: AC/ CMV (Assist Control/ Continuous Mandatory Ventilation), RR (machine): 14, TV (machine): 440, FiO2: 40, PEEP: 5, ITime: 1, MAP: 9.6, PIP: 28    LABS:                        11.6   10.22 )-----------( 196      ( 30 Oct 2020 06:48 )             37.2     10-30    150<H>  |  115<H>  |  35<H>  ----------------------------<  143<H>  3.6   |  26  |  1.40<H>    Ca    9.0      30 Oct 2020 06:48  Mg     2.6     10-30    TPro  7.4  /  Alb  2.9<L>  /  TBili  0.3  /  DBili  x   /  AST  28  /  ALT  22  /  AlkPhos  77  10-30    PT/INR - ( 29 Oct 2020 22:39 )   PT: 13.0 sec;   INR: 1.08 ratio         PTT - ( 29 Oct 2020 22:39 )  PTT:43.7 sec  Urinalysis Basic - ( 29 Oct 2020 22:39 )    Color: Yellow / Appearance: Slightly Turbid / S.010 / pH: x  Gluc: x / Ketone: Negative  / Bili: Negative / Urobili: Negative mg/dL   Blood: x / Protein: 100 mg/dL / Nitrite: Negative   Leuk Esterase: Moderate / RBC: 6-10 /HPF / WBC 11-25   Sq Epi: x / Non Sq Epi: Few / Bacteria: Many        ABG - ( 29 Oct 2020 22:57 )  pH, Arterial: x     pH, Blood: 7.38  /  pCO2: 39    /  pO2: 459   / HCO3: 23    / Base Excess: -1.5  /  SaO2: 99                  WBC:  WBC Count: 10.22 K/uL (10-30 @ 06:48)  WBC Count: 20.99 K/uL (10-29 @ 22:39)      MICROBIOLOGY:  RECENT CULTURES:        CARDIAC MARKERS ( 30 Oct 2020 06:48 )  .086 ng/mL / x     / x     / x     / x            PT/INR - ( 29 Oct 2020 22:39 )   PT: 13.0 sec;   INR: 1.08 ratio         PTT - ( 29 Oct 2020 22:39 )  PTT:43.7 sec    Sodium:  Sodium, Serum: 150 mmol/L (10-30 @ 06:48)  Sodium, Serum: 146 mmol/L (10-29 @ 22:39)      1.40 mg/dL 10-30 @ 06:48  2.29 mg/dL 10-29 @ 22:39      Hemoglobin:  Hemoglobin: 11.6 g/dL (10-30 @ 06:48)  Hemoglobin: 13.2 g/dL (10-29 @ 22:39)      Platelets: Platelet Count - Automated: 196 K/uL (10-30 @ 06:48)  Platelet Count - Automated: 332 K/uL (10-29 @ 22:39)      LIVER FUNCTIONS - ( 30 Oct 2020 06:48 )  Alb: 2.9 g/dL / Pro: 7.4 g/dL / ALK PHOS: 77 U/L / ALT: 22 U/L / AST: 28 U/L / GGT: x             Urinalysis Basic - ( 29 Oct 2020 22:39 )    Color: Yellow / Appearance: Slightly Turbid / S.010 / pH: x  Gluc: x / Ketone: Negative  / Bili: Negative / Urobili: Negative mg/dL   Blood: x / Protein: 100 mg/dL / Nitrite: Negative   Leuk Esterase: Moderate / RBC: 6-10 /HPF / WBC 11-25   Sq Epi: x / Non Sq Epi: Few / Bacteria: Many        RADIOLOGY & ADDITIONAL STUDIES:

## 2020-10-30 NOTE — CONSULT NOTE ADULT - ASSESSMENT
[facility]  DOA 10/30/2020 DR ILIANA TEJEDA            Initial evaluation/Pulmonary Critical Care consultation requested on 10/30/2020   by Dr Tejeda   from Dr Sandoval   Patient examined chart reviewed    HOSPITAL ADMISSION   PATIENT CAME  FROM (if information available)      [facility]  DOA 10/30/2020 DR ILIANA TEJEDA       REVIEW OF SYMPTOMS      Able to give ROS  NO     PHYSICAL EXAM    HEENT Unremarkable PERRLA atraumatic   RESP Fair air entry EXP prolonged    Harsh breath sound Resp distres mild   CARDIAC S1 S2 No S3     NO JVD    ABDOMEN SOFT BS PRESENT NOT DISTENDED No hepatosplenomegaly PEDAL EDEMA present No calf tenderness  NO rash     PT DATA/BEST PRACTICE  ALLERGY        codeine       WT                             10/30/2020 56   BMI                                  10/30/2020 22      ADVANCED DIRECTIVE     HEAD OF BED ELEVATION Yes      DVT PROPHYLAXIS.   lvnx 30 (10/30)      DIET..                                                                           SQUIRES PROPHYLAXIS. FAMOTIDINE 20 (10/30)  INFECTION PPLX          chlorhexidine 4% (10/30)                                           OXYGENATION.         VITALS.   10/30/2020 afeb 70 110/50   10/30/2020 7a AC 14/440/5/.4          IV F.    (10/30)         MICROBIO.   COVID pcr 10/30/2020 pcr negv   Ua 10/29/2020  1010 l estr mod w 11-25 r 6-10            ANTIBIO.   Rocephin 2g (10/30)        ABG     /12/5/100% 738/39/459         LABS.   W 10/29-10/30/2020 w 20.9-10.2   la 10/29-10/30 la 8.7-3.8   Tr 10/30/2020 Tr .086   BNP 10/30/2020 1346   Hb 10/29-30/2020 Hb 13.2-11.6   inr 10/29 inr 1   Na 10/29-10/30 Na 146-150   Cr 10/29-10/30 2.2-1.4            MEDS.    (10/30)   LVNX 30 (10/30)  FAMOTIDINE 20 (10/30)  CARAFATE 1.2 (10/30)  INSULIN     FENTAYL ptch 12 (10/30)           PATIENT SUMMARY.   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  SEPTIC SHOCK On iv fluids  Target MAP 65 (+)  LACTICEMIA On iv fluids Monitor    CHF 10/30/2020 bnp 1346 Suggest echo   COVID STATUS 10/30/2020 pcr negv   UTI RO PNEUMONIA Leukocytosis trach poa Await cult Started empirically on Rocephin 2g (10/30) Adjust abio based on further results   VENT Target po 90-95 pH 730 (+) Pplat 35 (-)  Monitor po abg Adjust vent settings as needed  TRACH CARE   STRESS ULCER PPLX   DVT PPLx   TROPONINEMIA Likely demand related On asa   DIET Start peg feeds   PEG CARE   HYPERNATREMIA 10/30/2020 Na 150 Once volume repleted change to hypotonic fluids and monitor   RYAN Likely prerenal Poss atn sec sepsis Monitor lytes cr iv fluids       TIME SPENT   Over 55 minutes aggregate care time spent on encounter; activities included   direct patient care, counseling and/or coordinating care reviewing notes, lab data/ imaging , discussion with multidisciplinary team/ patient  /family and explaining in detail risks, benefits, alternatives  of the recommendations     [facility]  DOA 10/30/2020 DR ILIANA TEJEDA

## 2020-10-30 NOTE — H&P ADULT - PROBLEM SELECTOR PLAN 1
Admitted for septic workup and evaluation,send blood and urine cx,serial lactate levels,monitor vitals closley,ivfs hydration,monitor urine output and renal profile,iv abx initiated -ON ZOSYN  ,id cons requested

## 2020-10-30 NOTE — H&P ADULT - NSHPLABSRESULTS_GEN_ALL_CORE
EXAM:  XR CHEST PORTABLE URGENT 1V                                  PROCEDURE DATE:  10/29/2020          INTERPRETATION:  CLINICAL STATEMENT: Chest Pain.    TECHNIQUE: AP view of the chest.    COMPARISON: 5/29/2020    FINDINGS/  IMPRESSION:  Tracheostomy tube again noted.    Nonspecific mild opacity medial right lung base. Follow-up recommended.    No pleural effusion.    Heart size cannot be accurately assessed in this projection.    < end of copied text >

## 2020-10-30 NOTE — CONSULT NOTE ADULT - SUBJECTIVE AND OBJECTIVE BOX
[facility] [place]     Patient is a 74y old  Female who presents with a chief complaint of sepsis (30 Oct 2020 02:08)      HPI:      HPI:    74y Female for Cardiology Consult    PAST MEDICAL & SURGICAL HISTORY:  Pneumonia    Quadriplegia    COVID-19 virus detected    Advanced dementia    DM (diabetes mellitus)    HTN (hypertension)    CVA (cerebral vascular accident)    Respiratory failure    Constipation    GERD (gastroesophageal reflux disease)    Hypertension    Respiratory failure    Diabetes mellitus    Aphasic stroke    Dementia of frontal lobe type    Feeding by G-tube    Tracheostomy tube present    Tracheostomy in place    Gastrostomy in place    Hx of appendectomy        FAMILY HISTORY:  No pertinent family history in first degree relatives        SOCIAL HISTORY:   Alcohol:  Smoking:    Allergies    codeine (Hives)    Intolerances        MEDICATIONS  (STANDING):  aspirin Suppository 300 milliGRAM(s) Rectal daily  calcium carbonate   1250 mG (OsCal) 1 Tablet(s) Oral two times a day  cefTRIAXone   IVPB 2000 milliGRAM(s) IV Intermittent every 24 hours  chlorhexidine 4% Liquid 1 Application(s) Topical <User Schedule>  dextrose 5%. 1000 milliLiter(s) (50 mL/Hr) IV Continuous <Continuous>  enoxaparin Injectable 40 milliGRAM(s) SubCutaneous daily  famotidine    Tablet 20 milliGRAM(s) Oral daily  fentaNYL   Patch  12 MICROgram(s)/Hr 1 Patch Transdermal every 72 hours  insulin lispro (ADMELOG) corrective regimen sliding scale   SubCutaneous every 6 hours  lactated ringers. 1000 milliLiter(s) (150 mL/Hr) IV Continuous <Continuous>  lactobacillus acidophilus 1 Tablet(s) Oral two times a day  psyllium Powder 1 Packet(s) Oral at bedtime  sodium chloride 0.9%. 1000 milliLiter(s) (60 mL/Hr) IV Continuous <Continuous>  sucralfate 1 Gram(s) Oral two times a day    MEDICATIONS  (PRN):  atropine 1% Ophthalmic Solution for SubLingual Use 1 Drop(s) SubLingual three times a day PRN salicary secretion  dextrose 40% Gel 15 Gram(s) Oral once PRN Blood Glucose LESS THAN 70 milliGRAM(s)/deciliter  glucagon  Injectable 1 milliGRAM(s) IntraMuscular once PRN Glucose LESS THAN 70 milligrams/deciliter      REVIEW OF SYSTEMS:  CONSTITUTIONAL: No fever, weight loss, chills, shakes, or fat  RESPIRATORY: No cough, wheezing, hemoptysis, or shortness of breath  CARDIOVASCULAR: No chest pain, dyspnea, palpitations, dizziness, syncope, paroxysmal nocturnal dyspnea, orthopnea, or arm or leg swelling  GASTROINTESTINAL: No abdominal  or epigastric pain, nausea, vomiting, hematemesis, diarrhea, constipation, melena or bright red bloo  NEUROLOGICAL: No headaches, memory loss, slurred speech, limb weakness, loss of strength, numbness, or tremors  SKIN: No itching, burning, rashes, or lesions  ENDOCRINE: No heat or cold intolerance, or hair loss  MUSCULOSKELETAL: No joint pain or swelling, muscle, back, or extremity pain  HEME/LYMPH: No easy bruising or bleeding gums  ALLERY AND IMMUNOLOGIC: No hives or rash.    Vital Signs Last 24 Hrs  T(C): 36.8 (30 Oct 2020 02:24), Max: 38.6 (29 Oct 2020 22:14)  T(F): 98.2 (30 Oct 2020 02:24), Max: 101.5 (29 Oct 2020 22:14)  HR: 72 (30 Oct 2020 02:24) (72 - 130)  BP: 97/51 (30 Oct 2020 02:24) (72/37 - 203/93)  BP(mean): --  RR: 16 (30 Oct 2020 02:24) (12 - 43)  SpO2: 100% (30 Oct 2020 02:24) (98% - 100%)    PHYSICAL EXAM:  HEAD:  Atraumatic, Normocephalic  EYES: EOMI, PERRLA, conjunctiva and sclera clear  NECK: Supple and normal thyroid.  No JVD or carotid bruit.   HEART: S1, S2 regular , 1/6 soft ejection systolic murmur in mitral area , no thrill and no gallops .  PULMONARY: Bilateral vesicular breathing , few scattered ronchi and few scattered rales are present .  ABDOMEN: Soft nontender and positive bowl sounds   EXTREMITIES:  No clubbing, cyanosis, or pedal  edema  NEUROLOGICAL: AAOX3 , no focal deficit .    LABS:                        13.2   20.99 )-----------( 332      ( 29 Oct 2020 22:39 )             43.1     10-29    146<H>  |  104  |  47<H>  ----------------------------<  348<H>  4.2   |  24  |  2.29<H>    Ca    12.1<H>      29 Oct 2020 22:39    TPro  9.2<H>  /  Alb  4.0  /  TBili  0.5  /  DBili  x   /  AST  24  /  ALT  29  /  AlkPhos  95  10-29        PT/INR - ( 29 Oct 2020 22:39 )   PT: 13.0 sec;   INR: 1.08 ratio         PTT - ( 29 Oct 2020 22:39 )  PTT:43.7 sec  Urinalysis Basic - ( 29 Oct 2020 22:39 )    Color: Yellow / Appearance: Slightly Turbid / S.010 / pH: x  Gluc: x / Ketone: Negative  / Bili: Negative / Urobili: Negative mg/dL   Blood: x / Protein: 100 mg/dL / Nitrite: Negative   Leuk Esterase: Moderate / RBC: 6-10 /HPF / WBC 11-25   Sq Epi: x / Non Sq Epi: Few / Bacteria: Many      BNP      EKG:  ECHO:  IMAGING:    Assessment and Plan :     Will continue to follow during hospital course and give further recommendations as needed . Thanks for your referral . if any questions please contact me at office ([phone] [phone]) JOSIE SON MD [place] [id number]  [address]  OFFICE : [phone]  CELL : [phone]    CARDIOLOGY CONSULT :    Patient is a 74y old  Female who presents with a chief complaint of sepsis (30 Oct 2020 02:08)      HPI:      HPI:    74y Female for Cardiology Consult    PAST MEDICAL & SURGICAL HISTORY:  Pneumonia    Quadriplegia    COVID-19 virus detected    Advanced dementia    DM (diabetes mellitus)    HTN (hypertension)    CVA (cerebral vascular accident)    Respiratory failure    Constipation    GERD (gastroesophageal reflux disease)    Hypertension    Respiratory failure    Diabetes mellitus    Aphasic stroke    Dementia of frontal lobe type    Feeding by G-tube    Tracheostomy tube present    Tracheostomy in place    Gastrostomy in place    Hx of appendectomy        FAMILY HISTORY:  No pertinent family history in first degree relatives        SOCIAL HISTORY:   Alcohol:  Smoking:    Allergies    codeine (Hives)    Intolerances        MEDICATIONS  (STANDING):  aspirin Suppository 300 milliGRAM(s) Rectal daily  calcium carbonate   1250 mG (OsCal) 1 Tablet(s) Oral two times a day  cefTRIAXone   IVPB 2000 milliGRAM(s) IV Intermittent every 24 hours  chlorhexidine 4% Liquid 1 Application(s) Topical <User Schedule>  dextrose 5%. 1000 milliLiter(s) (50 mL/Hr) IV Continuous <Continuous>  enoxaparin Injectable 40 milliGRAM(s) SubCutaneous daily  famotidine    Tablet 20 milliGRAM(s) Oral daily  fentaNYL   Patch  12 MICROgram(s)/Hr 1 Patch Transdermal every 72 hours  insulin lispro (ADMELOG) corrective regimen sliding scale   SubCutaneous every 6 hours  lactated ringers. 1000 milliLiter(s) (150 mL/Hr) IV Continuous <Continuous>  lactobacillus acidophilus 1 Tablet(s) Oral two times a day  psyllium Powder 1 Packet(s) Oral at bedtime  sodium chloride 0.9%. 1000 milliLiter(s) (60 mL/Hr) IV Continuous <Continuous>  sucralfate 1 Gram(s) Oral two times a day    MEDICATIONS  (PRN):  atropine 1% Ophthalmic Solution for SubLingual Use 1 Drop(s) SubLingual three times a day PRN salicary secretion  dextrose 40% Gel 15 Gram(s) Oral once PRN Blood Glucose LESS THAN 70 milliGRAM(s)/deciliter  glucagon  Injectable 1 milliGRAM(s) IntraMuscular once PRN Glucose LESS THAN 70 milligrams/deciliter  Vital Signs Last 24 Hrs  T(C): 36.8 (30 Oct 2020 02:24), Max: 38.6 (29 Oct 2020 22:14)  T(F): 98.2 (30 Oct 2020 02:24), Max: 101.5 (29 Oct 2020 22:14)  HR: 72 (30 Oct 2020 02:24) (72 - 130)  BP: 97/51 (30 Oct 2020 02:24) (72/37 - 203/93)  BP(mean): --  RR: 16 (30 Oct 2020 02:24) (12 - 43)  SpO2: 100% (30 Oct 2020 02:24) (98% - 100%)      LABS:                        13.2   20.99 )-----------( 332      ( 29 Oct 2020 22:39 )             43.1     10-    146<H>  |  104  |  47<H>  ----------------------------<  348<H>  4.2   |  24  |  2.29<H>    Ca    12.1<H>      29 Oct 2020 22:39    TPro  9.2<H>  /  Alb  4.0  /  TBili  0.5  /  DBili  x   /  AST  24  /  ALT  29  /  AlkPhos  95  10-29        PT/INR - ( 29 Oct 2020 22:39 )   PT: 13.0 sec;   INR: 1.08 ratio         PTT - ( 29 Oct 2020 22:39 )  PTT:43.7 sec  Urinalysis Basic - ( 29 Oct 2020 22:39 )    Color: Yellow / Appearance: Slightly Turbid / S.010 / pH: x  Gluc: x / Ketone: Negative  / Bili: Negative / Urobili: Negative mg/dL   Blood: x / Protein: 100 mg/dL / Nitrite: Negative   Leuk Esterase: Moderate / RBC: 6-10 /HPF / WBC 11-25   Sq Epi: x / Non Sq Epi: Few / Bacteria: Many      Assessment and Plan :   FULL CONSULT DICTATED .  74 years old female with H/O respiratory failure , COVID -19 pneumonia renal insufficiency , CVA has lethargy , spiking temperature and hypotensive and has urosepsis and BP better after I/V hydration . Continue I/V hydration and I/V antibiotics . Will send Troponin I levels at frequent intervals to R/O MI . Patient prognosis guarded .   Will continue to follow during hospital course and give further recommendations as needed . Thanks for your referral . if any questions please contact me at office [phone])

## 2020-10-30 NOTE — CONSULT NOTE ADULT - ASSESSMENT
75 yo Female sent from [facility] with respiratory distress. No report of fever or hypoxia. Patient trached/vented ,s/p peg  and non-verbal, unable to contribute to history. Transferred to [place] from [place] ED for admission Admitted for septic workup and evaluation ,send blood and urine cx,serial lactate levels,monitor vitals closely hydration,monitor urine output and renal profile, iv abx initiated -s/p vanco and zosyn . BP stable upon [place] ED arrival Med hx is significant for Advanced dementia ,s/p  Aphasic stroke  , Constipation  ,COVID-19   ,CVA (cerebral vascular accident)  ,Dementia of frontal lobe type  ,Diabetes mellitus  ,DM (diabetes mellitus)  ,GERD (gastroesophageal reflux disease)    HTN (hypertension)  ,Hypertension  ,Pneumonia  ,Quadriplegia  ,Respiratory failure ,s/p tracheostomy and peg .Found to have RYAN ,hypernatremia .

## 2020-10-30 NOTE — CONSULT NOTE ADULT - NUTRITIONAL ASSESSMENT
MEDICATIONS  (STANDING):  ALBUTerol    90 MICROgram(s) HFA Inhaler 4 Puff(s) Inhalation every 6 hours  aspirin 325 milliGRAM(s) Enteral Tube daily  calcium carbonate 1250 mG  + Vitamin D (OsCal 500 + D) 1 Tablet(s) Oral two times a day  chlorhexidine 2% Cloths 1 Application(s) Topical daily  dextrose 5%. 1000 milliLiter(s) (50 mL/Hr) IV Continuous <Continuous>  famotidine    Tablet 20 milliGRAM(s) Oral daily  fentaNYL   Patch  12 MICROgram(s)/Hr 1 Patch Transdermal every 72 hours  insulin lispro (ADMELOG) corrective regimen sliding scale   SubCutaneous every 6 hours  ipratropium 17 MICROgram(s) HFA Inhaler 4 Puff(s) Inhalation every 6 hours  lactated ringers. 1000 milliLiter(s) (150 mL/Hr) IV Continuous <Continuous>  lactobacillus acidophilus 1 Tablet(s) Oral two times a day  piperacillin/tazobactam IVPB.. 3.375 Gram(s) IV Intermittent every 8 hours  psyllium Powder 1 Packet(s) Oral at bedtime  sucralfate 1 Gram(s) Oral two times a day  tobramycin for Nebulization 300 milliGRAM(s) Inhalation every 12 hours    MEDICATIONS  (PRN):  atropine 1% Ophthalmic Solution for SubLingual Use 1 Drop(s) SubLingual three times a day PRN salicary secretion  dextrose 40% Gel 15 Gram(s) Oral once PRN Blood Glucose LESS THAN 70 milliGRAM(s)/deciliter  glucagon  Injectable 1 milliGRAM(s) IntraMuscular once PRN Glucose LESS THAN 70 milligrams/deciliter

## 2020-10-30 NOTE — CONSULT NOTE ADULT - SUBJECTIVE AND OBJECTIVE BOX
ltation:    Initial Consult:  · Requested by Name:	Dr Tejeda  · Date/Time:	28-May-2020 23:56  · Reason for Referral/Consultation:	REsp distress  · Reason for Admission	RESPIRATORY DISTRESS      History of Present Illness:   74 Female transferred from  [facility] center to [place] ED with report of sepsis, pneumonia, RYAN, patient on chronic trach collar , recent admission to [facility]  20 to 20 for hypoxia, pneumonia, COVID positive 20 ,developed hypoxia and was placed on respiratory support ,required ICU admission with shock and was placed on pressors  .Treated for UTI and pseudomonal pneumonia ,s/p cefepime ,had elevation of D-dimers .CTT was negative for PE , Doppler neg for DVT .Seen by neurologist for sz -like activity ,neurologist impression was that it was related to hypoxia CT Brain was negative for CVA .COVID  tested negative on  and   Patient had been sent to ER for respiratory distress ,associated with hypotension tachycardia ,tachypnea and diaphoresis  .Patient has a hx significant for Advanced dementia  ,Aphasic stroke  ,Constipation  ,COVID-19 virus detected  ,COPD ,Anxiety ,Dysphagia ,hyperkalemia ,pneumonia ,pulmonary edema , CVA (cerebral vascular accident)  ,Dementia of frontal lobe type  ,Diabetes mellitus  ,DM (diabetes mellitus)  ,GERD (gastroesophageal reflux disease)  ,HTN (hypertension)  ,Hypertension  ,Pneumonia  ,Quadriplegia  ,Respiratory failure  ,Feeding by G-tube  ,Gastrostomy in place  ,Hx of appendectomy  ,Tracheostomy in place  ,Tracheostomy tube present, 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 Admitted to ICU ,seen by pulm cons Dr Sandoval on admission to [place] ED ( transferred from [facility])and was admitted to ICU  for septic workup and evaluation ,send blood and urine cx, serial lactate levels ,monitor vitals closely hydration ,monitor urine output and renal profile ,iv abx initiated Palliative care consult requested ,to discuss advance directives and complete MOLST .Patient was diagnosed with sepsis 2/2 to UTI .       PAST MEDICAL & SURGICAL HISTORY:  Pneumonia    Quadriplegia    COVID-19 virus detected    Advanced dementia    DM (diabetes mellitus)    HTN (hypertension)    CVA (cerebral vascular accident)    Respiratory failure    Constipation    GERD (gastroesophageal reflux disease)    Hypertension    Respiratory failure    Diabetes mellitus    Aphasic stroke    Dementia of frontal lobe type    Feeding by G-tube    Tracheostomy tube present    Tracheostomy in place    Gastrostomy in place    Hx of appendectomy      Allergies    codeine (Hives)    Intolerances      FAMILY HISTORY:  No pertinent family history in first degree relatives    ROS: unable to assess due to mentation .    Medications:  cefTRIAXone   IVPB 2000 milliGRAM(s) IV Intermittent every 24 hours        fentaNYL   Patch  12 MICROgram(s)/Hr 1 Patch Transdermal every 72 hours      enoxaparin Injectable 40 milliGRAM(s) SubCutaneous daily    famotidine    Tablet 20 milliGRAM(s) Oral daily  psyllium Powder 1 Packet(s) Oral at bedtime  sucralfate 1 Gram(s) Oral two times a day      dextrose 40% Gel 15 Gram(s) Oral once PRN  glucagon  Injectable 1 milliGRAM(s) IntraMuscular once PRN  insulin lispro (ADMELOG) corrective regimen sliding scale   SubCutaneous every 6 hours    calcium carbonate   1250 mG (OsCal) 1 Tablet(s) Oral two times a day  dextrose 5%. 1000 milliLiter(s) IV Continuous <Continuous>  lactated ringers. 1000 milliLiter(s) IV Continuous <Continuous>      atropine 1% Ophthalmic Solution for SubLingual Use 1 Drop(s) SubLingual three times a day PRN  chlorhexidine 4% Liquid 1 Application(s) Topical <User Schedule>    lactobacillus acidophilus 1 Tablet(s) Oral two times a day      Mode: AC/ CMV (Assist Control/ Continuous Mandatory Ventilation)  RR (machine): 12  TV (machine): 420  FiO2: 60  PEEP: 5  ITime: 1  MAP: 16  PIP: 28      ICU Vital Signs Last 24 Hrs  T(C): 36.8 (30 Oct 2020 01:22), Max: 38.6 (29 Oct 2020 22:14)  T(F): 98.2 (30 Oct 2020 01:22), Max: 101.5 (29 Oct 2020 22:14)  HR: 92 (30 Oct 2020 01:25) (80 - 130)  BP: 108/77 (30 Oct 2020 01:22) (72/37 - 203/93)  BP(mean): --  ABP: --  ABP(mean): --  RR: 14 (30 Oct 2020 01:22) (12 - 43)  SpO2: 100% (30 Oct 2020 01:25) (98% - 100%)    Vital Signs Last 24 Hrs  T(C): 36.8 (30 Oct 2020 01:22), Max: 38.6 (29 Oct 2020 22:14)  T(F): 98.2 (30 Oct 2020 01:22), Max: 101.5 (29 Oct 2020 22:14)  HR: 92 (30 Oct 2020 01:25) (80 - 130)  BP: 108/77 (30 Oct 2020 01:22) (72/37 - 203/93)  BP(mean): --  RR: 14 (30 Oct 2020 01:22) (12 - 43)  SpO2: 100% (30 Oct 2020 01:25) (98% - 100%)    ABG - ( 29 Oct 2020 22:57 )  pH, Arterial: x     pH, Blood: 7.38  /  pCO2: 39    /  pO2: 459   / HCO3: 23    / Base Excess: -1.5  /  SaO2: 99          I&O's Detail        LABS:                        13.2   20.99 )-----------( 332      ( 29 Oct 2020 22:39 )             43.1     10-    146<H>  |  104  |  47<H>  ----------------------------<  348<H>  4.2   |  24  |  2.29<H>    Ca    12.1<H>      29 Oct 2020 22:39    TPro  9.2<H>  /  Alb  4.0  /  TBili  0.5  /  DBili  x   /  AST  24  /  ALT  29  /  AlkPhos  95  10-29          CAPILLARY BLOOD GLUCOSE        PT/INR - ( 29 Oct 2020 22:39 )   PT: 13.0 sec;   INR: 1.08 ratio         PTT - ( 29 Oct 2020 22:39 )  PTT:43.7 sec  Urinalysis Basic - ( 29 Oct 2020 22:39 )    Color: Yellow / Appearance: Slightly Turbid / S.010 / pH: x  Gluc: x / Ketone: Negative  / Bili: Negative / Urobili: Negative mg/dL   Blood: x / Protein: 100 mg/dL / Nitrite: Negative   Leuk Esterase: Moderate / RBC: 6-10 /HPF / WBC 11-25   Sq Epi: x / Non Sq Epi: Few / Bacteria: Many      CULTURES:      Physical Examination:     General:  tachypneic    , lethargic     HEENT: Pupils equal, reactive to light.  Symmetric.    PULM:  diminished geovanny, no wheeze, no rales     CVS: Regular rate and rhythm, no murmurs, rubs, or gallops    ABD: Soft, nondistended, nontender, normoactive bowel sounds, no masses  peg site c/d/i, no rebound tenderness     EXT: No edema, nontender    RADIOLOGY: ***   chest xray : clear  geovanny , trach in place.     ECG:     Sinus tachycardia with short WI  Low voltage QRS  Incomplete right bundle branch block      Assessment and Recommendation:      74 yr female hx of dm, quadaplegic,  resp failure on chronic vent , htn, peg, CVA, advanced dementia, recent admission for +++COVID , d/c to vent facility with neg covid, now with resp distress, profound , most likely UTI  sepsis , requiring broad spectrum ABX, ICU admission     Cont vent support  broad spectrum ABX, ID consult in am   Serial ABG , monitor lactate   Aggressive IV resuscitation,   follow up on blood culture    GI / DVT prophylaxis.   keep K>4, mag >2.0    start Pressor for renal perfusion if needed.    will hold on lantus at present, Insulin sliding scale at present   NPO at present    d/w EICU team.      ltation:    Initial Consult:  · Requested by Name:	Dr Tejeda  · Date/Time:	28-May-2020 23:56  · Reason for Referral/Consultation:	REsp distress  · Reason for Admission	RESPIRATORY DISTRESS      History of Present Illness:   74 Female transferred from  [facility] center to [place] ED with report of sepsis,  RYAN, patient on chronic vent  , recent admission to [facility]  20 to 20 for hypoxia, pneumonia, COVID positive 20 ,developed hypoxia and was placed on respiratory support ,required ICU admission with shock and was placed on pressors  .Treated for UTI and pseudomonal pneumonia ,s/p cefepime ,had elevation of D-dimers .CTT was negative for PE , Doppler neg for DVT .Seen by neurologist for sz -like activity ,neurologist impression was that it was related to hypoxia CT Brain was negative for CVA .COVID  tested negative on  and   Patient had been sent to ER for respiratory distress ,associated with hypotension tachycardia ,tachypnea and diaphoresis  .Patient has a hx significant for Advanced dementia  ,Aphasic stroke  ,Constipation  ,COVID-19 virus detected  ,COPD ,Anxiety ,Dysphagia ,hyperkalemia ,pneumonia ,pulmonary edema , CVA (cerebral vascular accident)  ,Dementia of frontal lobe type  ,Diabetes mellitus  ,DM (diabetes mellitus)  ,GERD (gastroesophageal reflux disease)  ,HTN (hypertension)  ,Hypertension  ,Pneumonia  ,Quadriplegia  ,Respiratory failure  ,Feeding by G-tube  ,Gastrostomy in place  ,Hx of appendectomy  ,Tracheostomy in place  ,Tracheostomy tube present, 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    pt now with  + UA , transferred to [place] for  sepsis.       PAST MEDICAL & SURGICAL HISTORY:  Pneumonia    Quadriplegia    COVID-19 virus detected    Advanced dementia    DM (diabetes mellitus)    HTN (hypertension)    CVA (cerebral vascular accident)    Respiratory failure    Constipation    GERD (gastroesophageal reflux disease)    Hypertension    Respiratory failure    Diabetes mellitus    Aphasic stroke    Dementia of frontal lobe type    Feeding by G-tube    Tracheostomy tube present    Tracheostomy in place    Gastrostomy in place    Hx of appendectomy      Allergies    codeine (Hives)    Intolerances      FAMILY HISTORY:  No pertinent family history in first degree relatives    ROS: unable to assess due to mentation .    Medications:  cefTRIAXone   IVPB 2000 milliGRAM(s) IV Intermittent every 24 hours        fentaNYL   Patch  12 MICROgram(s)/Hr 1 Patch Transdermal every 72 hours      enoxaparin Injectable 40 milliGRAM(s) SubCutaneous daily    famotidine    Tablet 20 milliGRAM(s) Oral daily  psyllium Powder 1 Packet(s) Oral at bedtime  sucralfate 1 Gram(s) Oral two times a day      dextrose 40% Gel 15 Gram(s) Oral once PRN  glucagon  Injectable 1 milliGRAM(s) IntraMuscular once PRN  insulin lispro (ADMELOG) corrective regimen sliding scale   SubCutaneous every 6 hours    calcium carbonate   1250 mG (OsCal) 1 Tablet(s) Oral two times a day  dextrose 5%. 1000 milliLiter(s) IV Continuous <Continuous>  lactated ringers. 1000 milliLiter(s) IV Continuous <Continuous>      atropine 1% Ophthalmic Solution for SubLingual Use 1 Drop(s) SubLingual three times a day PRN  chlorhexidine 4% Liquid 1 Application(s) Topical <User Schedule>    lactobacillus acidophilus 1 Tablet(s) Oral two times a day      Mode: AC/ CMV (Assist Control/ Continuous Mandatory Ventilation)  RR (machine): 12  TV (machine): 420  FiO2: 60  PEEP: 5  ITime: 1  MAP: 16  PIP: 28      ICU Vital Signs Last 24 Hrs  T(C): 36.8 (30 Oct 2020 01:22), Max: 38.6 (29 Oct 2020 22:14)  T(F): 98.2 (30 Oct 2020 01:), Max: 101.5 (29 Oct 2020 22:14)  HR: 92 (30 Oct 2020 01:25) (80 - 130)  BP: 108/77 (30 Oct 2020 01:22) (72/37 - 203/93)  BP(mean): --  ABP: --  ABP(mean): --  RR: 14 (30 Oct 2020 01:22) (12 - 43)  SpO2: 100% (30 Oct 2020 01:) (98% - 100%)    Vital Signs Last 24 Hrs  T(C): 36.8 (30 Oct 2020 01:22), Max: 38.6 (29 Oct 2020 22:14)  T(F): 98.2 (30 Oct 2020 01:22), Max: 101.5 (29 Oct 2020 22:14)  HR: 92 (30 Oct 2020 01:25) (80 - 130)  BP: 108/77 (30 Oct 2020 01:22) (72/37 - 203/93)  BP(mean): --  RR: 14 (30 Oct 2020 01:22) (12 - 43)  SpO2: 100% (30 Oct 2020 01:25) (98% - 100%)    ABG - ( 29 Oct 2020 22:57 )  pH, Arterial: x     pH, Blood: 7.38  /  pCO2: 39    /  pO2: 459   / HCO3: 23    / Base Excess: -1.5  /  SaO2: 99          I&O's Detail        LABS:                        13.2   20.99 )-----------( 332      ( 29 Oct 2020 22:39 )             43.1     10-    146<H>  |  104  |  47<H>  ----------------------------<  348<H>  4.2   |  24  |  2.29<H>    Ca    12.1<H>      29 Oct 2020 22:39    TPro  9.2<H>  /  Alb  4.0  /  TBili  0.5  /  DBili  x   /  AST  24  /  ALT  29  /  AlkPhos  95  10-29          CAPILLARY BLOOD GLUCOSE        PT/INR - ( 29 Oct 2020 22:39 )   PT: 13.0 sec;   INR: 1.08 ratio         PTT - ( 29 Oct 2020 22:39 )  PTT:43.7 sec  Urinalysis Basic - ( 29 Oct 2020 22:39 )    Color: Yellow / Appearance: Slightly Turbid / S.010 / pH: x  Gluc: x / Ketone: Negative  / Bili: Negative / Urobili: Negative mg/dL   Blood: x / Protein: 100 mg/dL / Nitrite: Negative   Leuk Esterase: Moderate / RBC: 6-10 /HPF / WBC 11-25   Sq Epi: x / Non Sq Epi: Few / Bacteria: Many      CULTURES:      Physical Examination:     General:  tachypneic    , lethargic     HEENT: Pupils equal, reactive to light.  Symmetric.    PULM:  diminished geovanny, no wheeze, no rales     CVS: Regular rate and rhythm, no murmurs, rubs, or gallops    ABD: Soft, nondistended, nontender, normoactive bowel sounds, no masses  peg site c/d/i, no rebound tenderness     EXT: No edema, nontender    RADIOLOGY: ***   chest xray : clear  geovanny , trach in place.     ECG:     Sinus tachycardia with short CT  Low voltage QRS  Incomplete right bundle branch block      Assessment and Recommendation:      74 yr female hx of dm, quadaplegic,  resp failure on chronic vent , htn, peg, CVA, advanced dementia, recent admission for +++COVID , d/c to vent facility with neg covid, now with resp distress, sepsis, hypotensive  , most likely UTI  sepsis , requiring broad spectrum ABX, ICU admission     Cont vent support  broad spectrum ABX, ID consult in am   Serial ABG , monitor lactate   Aggressive IV resuscitation,   follow up on blood culture    GI / DVT prophylaxis.   keep K>4, mag >2.0    start Pressor for renal perfusion if needed.    will hold on lantus at present, Insulin sliding scale at present   NPO at present    d/w EICU team.

## 2020-10-30 NOTE — H&P ADULT - PROBLEM SELECTOR PLAN 4
2/2 TO sepsis with lactic acidosis , continue IV FLUIDS serial bmp ,ins/outs , nutr consult ,nephrology eval

## 2020-10-30 NOTE — DIETITIAN INITIAL EVALUATION ADULT. - ENTERAL
Glucerna 1.5 30cc/hr x 20 hrs, advance feeds by 10cc Q8h until goal rate of 50cc/hr x 20 hrs achieved.  (1500cal, 82.5 gm protein)

## 2020-10-30 NOTE — H&P ADULT - HISTORY OF PRESENT ILLNESS
75 yo Female sent from [facility] with respiratory distress. No report of fever or hypoxia. Patient trached/vented ,s/p peg  and non-verbal, unable to contribute to history. Transferred to [place] from [place] ED for admission Admitted for septic workup and evaluation ,send blood and urine cx,serial lactate levels,monitor vitals closely hydration,monitor urine output and renal profile, iv abx initiated -s/p vanco and zosyn . BP stable upon [place] ED arrival Med hx is significant for Advanced dementia ,s/p  Aphasic stroke    Constipation  ,COVID-19   ,CVA (cerebral vascular accident)  ,Dementia of frontal lobe type  ,Diabetes mellitus  ,DM (diabetes mellitus)  ,GERD (gastroesophageal reflux disease)    HTN (hypertension)  ,Hypertension  ,Pneumonia  ,Quadriplegia  ,Respiratory failure ,s/p tracheostomy and peg .Found to have RYAN ,hypernatremia and lactate elevated Admit for iv hydration,monitor renal profile and urine output,nutritionist consult,prealbumin level,serial bmp,nutritional supplements, Palliative care consult requested ,to discuss advance directives and complete MOLST

## 2020-10-30 NOTE — ED PROVIDER NOTE - OBJECTIVE STATEMENT
75 yo female sent from [facility] with respiratory distress. Onset uncertain. No report of fever or hypoxia. Patient trached/vented and non-verbal, unable to contribute to history.    transferred from [place] for ICU admission for urosepsis, IV vanco/zosyn given, Dr. Roca covering for Dr. Tejeda, ICU admitting, BP stable upon [place] ED arrival

## 2020-10-30 NOTE — H&P ADULT - NSHPSOCIALHISTORY_GEN_ALL_CORE
prior to  admission patient resided in [facility] & [facility] ,no etoh or tobacco reported

## 2020-10-30 NOTE — PROVIDER CONTACT NOTE (CRITICAL VALUE NOTIFICATION) - DATE AND TIME:
30-Oct-2020 07:30 Spoke to patient re symptoms.  She completed course of cipro, had to stop flagyl because she started having numbness and tingling in her hands and feet.  Was feeling well for 4-5 days then began to have 6-10 BMs/day with urgency and and abd cramping, no blood.  Started the lactulose and BM symptoms did not worsen, had nausea and upset stomach and was unable to drink it.  States she feels like she has pouchitis again and need another course of ABX.  Explained that I will discuss with Dr. Stoddard and will call her back with a plan after afternoon clinic.  Patient verbalizes understanding and agrees with the treatment plan.  Questions answered.    KG     30-Oct-2020 07:34

## 2020-10-30 NOTE — CONSULT NOTE ADULT - SUBJECTIVE AND OBJECTIVE BOX
Patient is a 74y Female whom presented to the hospital with ckd and manasa     PAST MEDICAL & SURGICAL HISTORY:  Pneumonia    Quadriplegia    COVID-19 virus detected    Advanced dementia    DM (diabetes mellitus)    HTN (hypertension)    CVA (cerebral vascular accident)    Respiratory failure    Constipation    GERD (gastroesophageal reflux disease)    Hypertension    Respiratory failure    Diabetes mellitus    Aphasic stroke    Dementia of frontal lobe type    Feeding by G-tube    Tracheostomy tube present    Tracheostomy in place    Gastrostomy in place    Hx of appendectomy        MEDICATIONS  (STANDING):  ALBUTerol    90 MICROgram(s) HFA Inhaler 4 Puff(s) Inhalation every 6 hours  aspirin 325 milliGRAM(s) Enteral Tube daily  calcium carbonate 1250 mG  + Vitamin D (OsCal 500 + D) 1 Tablet(s) Oral two times a day  chlorhexidine 2% Cloths 1 Application(s) Topical daily  dextrose 5%. 1000 milliLiter(s) (50 mL/Hr) IV Continuous <Continuous>  famotidine    Tablet 20 milliGRAM(s) Oral daily  fentaNYL   Patch  12 MICROgram(s)/Hr 1 Patch Transdermal every 72 hours  insulin lispro (ADMELOG) corrective regimen sliding scale   SubCutaneous every 6 hours  ipratropium 17 MICROgram(s) HFA Inhaler 4 Puff(s) Inhalation every 6 hours  lactated ringers. 1000 milliLiter(s) (150 mL/Hr) IV Continuous <Continuous>  lactobacillus acidophilus 1 Tablet(s) Oral two times a day  piperacillin/tazobactam IVPB.. 3.375 Gram(s) IV Intermittent every 8 hours  psyllium Powder 1 Packet(s) Oral at bedtime  sucralfate 1 Gram(s) Oral two times a day  tobramycin for Nebulization 300 milliGRAM(s) Inhalation every 12 hours      Allergies    codeine (Hives)    Intolerances        SOCIAL HISTORY:  Denies ETOh,Smoking,     FAMILY HISTORY:  No pertinent family history in first degree relatives        REVIEW OF SYSTEMS:    unable to obtained a good review system        VITAL:  T(C): , Max: 38.6 (10-29-20 @ 22:14)  T(F): , Max: 101.5 (10-29-20 @ 22:14)  HR: 61 (10-30-20 @ 15:00)  BP: 98/47 (10-30-20 @ 15:00)  BP(mean): 68 (10-30-20 @ 15:00)  RR: 14 (10-30-20 @ 15:00)  SpO2: 100% (10-30-20 @ 15:00)  Wt(kg): --    I and O's:    10-29 @ 07:01  -  10-30 @ 07:00  --------------------------------------------------------  IN: 800 mL / OUT: 700 mL / NET: 100 mL    10-30 @ 07:01  -  10-30 @ 16:30  --------------------------------------------------------  IN: 1440 mL / OUT: 450 mL / NET: 990 mL      Height (cm): 160 (10-30 @ 03:15), 162.6 (10-29 @ 22:14)  Weight (kg): 56.6 (10-30 @ 03:15), 56.7 (10-29 @ 22:25)  BMI (kg/m2): 22.1 (10-30 @ 03:15), 21.4 (10-29 @ 22:25)  BSA (m2): 1.58 (10-30 @ 03:15), 1.6 (10-29 @ 22:25)    PHYSICAL EXAM:    Constitutional: trached / vented ,s/p peg  and non-verbal  HEENT: conjunctive   clear   Respiratory: decrease bs b/l   Cardiovascular: S1 and S2  Gastrointestinal: BS+, soft, s/p  peg  Extremities: trace  peripheral edema    LABS:                        11.6   10.22 )-----------( 196      ( 30 Oct 2020 06:48 )             37.2     10-30    150<H>  |  115<H>  |  35<H>  ----------------------------<  143<H>  3.6   |  26  |  1.40<H>    Ca    9.0      30 Oct 2020 06:48  Mg     2.6     10-30    TPro  7.4  /  Alb  2.9<L>  /  TBili  0.3  /  DBili  x   /  AST  28  /  ALT  22  /  AlkPhos  77  10-30      Urine Studies:  Urinalysis Basic - ( 29 Oct 2020 22:39 )    Color: Yellow / Appearance: Slightly Turbid / S.010 / pH: x  Gluc: x / Ketone: Negative  / Bili: Negative / Urobili: Negative mg/dL   Blood: x / Protein: 100 mg/dL / Nitrite: Negative   Leuk Esterase: Moderate / RBC: 6-10 /HPF / WBC 11-25   Sq Epi: x / Non Sq Epi: Few / Bacteria: Many            RADIOLOGY & ADDITIONAL STUDIES:

## 2020-10-30 NOTE — ED ADULT NURSE REASSESSMENT NOTE - NS ED NURSE REASSESS COMMENT FT1
Martha OTERO from [facility] updated on plan of care; aware of transfer to [facility]
pt picked up for transport to [facility] by [facility]. consent for transport obtained from pt's 
telephone report given to BRANDEN Martinez at [facility]
received report and assumed care of pt at change of shift. pt only withdraws during painful procedures; unable to communicate her needs. NS bolus in progress cm= sinus

## 2020-10-31 LAB
A1C WITH ESTIMATED AVERAGE GLUCOSE RESULT: 6.6 % — HIGH (ref 4–5.6)
ANION GAP SERPL CALC-SCNC: 6 MMOL/L — SIGNIFICANT CHANGE UP (ref 5–17)
BASE EXCESS BLDA CALC-SCNC: -1.3 MMOL/L — SIGNIFICANT CHANGE UP (ref -2–2)
BASOPHILS # BLD AUTO: 0.03 K/UL — SIGNIFICANT CHANGE UP (ref 0–0.2)
BASOPHILS NFR BLD AUTO: 0.5 % — SIGNIFICANT CHANGE UP (ref 0–2)
BLOOD GAS COMMENTS ARTERIAL: SIGNIFICANT CHANGE UP
BLOOD GAS COMMENTS ARTERIAL: SIGNIFICANT CHANGE UP
BUN SERPL-MCNC: 21 MG/DL — SIGNIFICANT CHANGE UP (ref 7–23)
CALCIUM SERPL-MCNC: 8.9 MG/DL — SIGNIFICANT CHANGE UP (ref 8.5–10.1)
CHLORIDE SERPL-SCNC: 117 MMOL/L — HIGH (ref 96–108)
CO2 SERPL-SCNC: 26 MMOL/L — SIGNIFICANT CHANGE UP (ref 22–31)
CREAT SERPL-MCNC: 1 MG/DL — SIGNIFICANT CHANGE UP (ref 0.5–1.3)
EOSINOPHIL # BLD AUTO: 0.17 K/UL — SIGNIFICANT CHANGE UP (ref 0–0.5)
EOSINOPHIL NFR BLD AUTO: 2.7 % — SIGNIFICANT CHANGE UP (ref 0–6)
ESTIMATED AVERAGE GLUCOSE: 143 MG/DL — HIGH (ref 68–114)
GLUCOSE SERPL-MCNC: 118 MG/DL — HIGH (ref 70–99)
GRAM STN FLD: SIGNIFICANT CHANGE UP
HCO3 BLDA-SCNC: 23 MMOL/L — SIGNIFICANT CHANGE UP (ref 23–27)
HCT VFR BLD CALC: 32.6 % — LOW (ref 34.5–45)
HGB BLD-MCNC: 10 G/DL — LOW (ref 11.5–15.5)
HOROWITZ INDEX BLDA+IHG-RTO: 40 — SIGNIFICANT CHANGE UP
IMM GRANULOCYTES NFR BLD AUTO: 0.3 % — SIGNIFICANT CHANGE UP (ref 0–1.5)
LACTATE SERPL-SCNC: 2.8 MMOL/L — HIGH (ref 0.7–2)
LYMPHOCYTES # BLD AUTO: 1.88 K/UL — SIGNIFICANT CHANGE UP (ref 1–3.3)
LYMPHOCYTES # BLD AUTO: 29.8 % — SIGNIFICANT CHANGE UP (ref 13–44)
MAGNESIUM SERPL-MCNC: 2.4 MG/DL — SIGNIFICANT CHANGE UP (ref 1.6–2.6)
MCHC RBC-ENTMCNC: 27.5 PG — SIGNIFICANT CHANGE UP (ref 27–34)
MCHC RBC-ENTMCNC: 30.7 GM/DL — LOW (ref 32–36)
MCV RBC AUTO: 89.8 FL — SIGNIFICANT CHANGE UP (ref 80–100)
MONOCYTES # BLD AUTO: 0.59 K/UL — SIGNIFICANT CHANGE UP (ref 0–0.9)
MONOCYTES NFR BLD AUTO: 9.4 % — SIGNIFICANT CHANGE UP (ref 2–14)
NEUTROPHILS # BLD AUTO: 3.62 K/UL — SIGNIFICANT CHANGE UP (ref 1.8–7.4)
NEUTROPHILS NFR BLD AUTO: 57.3 % — SIGNIFICANT CHANGE UP (ref 43–77)
NRBC # BLD: 0 /100 WBCS — SIGNIFICANT CHANGE UP (ref 0–0)
PCO2 BLDA: 38 MMHG — SIGNIFICANT CHANGE UP (ref 32–46)
PH BLDA: 7.4 — SIGNIFICANT CHANGE UP (ref 7.35–7.45)
PHOSPHATE SERPL-MCNC: 2.7 MG/DL — SIGNIFICANT CHANGE UP (ref 2.5–4.5)
PLATELET # BLD AUTO: 156 K/UL — SIGNIFICANT CHANGE UP (ref 150–400)
PO2 BLDA: 80 MMHG — SIGNIFICANT CHANGE UP (ref 74–108)
POTASSIUM SERPL-MCNC: 4.4 MMOL/L — SIGNIFICANT CHANGE UP (ref 3.5–5.3)
POTASSIUM SERPL-SCNC: 4.4 MMOL/L — SIGNIFICANT CHANGE UP (ref 3.5–5.3)
RBC # BLD: 3.63 M/UL — LOW (ref 3.8–5.2)
RBC # FLD: 14.6 % — HIGH (ref 10.3–14.5)
SAO2 % BLDA: 95 % — SIGNIFICANT CHANGE UP (ref 92–96)
SARS-COV-2 IGG SERPL QL IA: POSITIVE
SARS-COV-2 IGM SERPL IA-ACNC: 249 AU/ML — HIGH
SODIUM SERPL-SCNC: 149 MMOL/L — HIGH (ref 135–145)
SPECIMEN SOURCE: SIGNIFICANT CHANGE UP
WBC # BLD: 6.31 K/UL — SIGNIFICANT CHANGE UP (ref 3.8–10.5)
WBC # FLD AUTO: 6.31 K/UL — SIGNIFICANT CHANGE UP (ref 3.8–10.5)

## 2020-10-31 PROCEDURE — 71045 X-RAY EXAM CHEST 1 VIEW: CPT | Mod: 26

## 2020-10-31 PROCEDURE — 99291 CRITICAL CARE FIRST HOUR: CPT

## 2020-10-31 RX ORDER — HYDRALAZINE HCL 50 MG
10 TABLET ORAL EVERY 4 HOURS
Refills: 0 | Status: DISCONTINUED | OUTPATIENT
Start: 2020-10-31 | End: 2020-11-04

## 2020-10-31 RX ORDER — LABETALOL HCL 100 MG
10 TABLET ORAL ONCE
Refills: 0 | Status: COMPLETED | OUTPATIENT
Start: 2020-10-31 | End: 2020-10-31

## 2020-10-31 RX ORDER — MIDAZOLAM HYDROCHLORIDE 1 MG/ML
2 INJECTION, SOLUTION INTRAMUSCULAR; INTRAVENOUS ONCE
Refills: 0 | Status: DISCONTINUED | OUTPATIENT
Start: 2020-10-31 | End: 2020-10-31

## 2020-10-31 RX ORDER — DEXTROSE 50 % IN WATER 50 %
50 SYRINGE (ML) INTRAVENOUS ONCE
Refills: 0 | Status: COMPLETED | OUTPATIENT
Start: 2020-10-31 | End: 2020-10-31

## 2020-10-31 RX ADMIN — Medication 1 TABLET(S): at 18:32

## 2020-10-31 RX ADMIN — Medication 300 MILLIGRAM(S): at 08:00

## 2020-10-31 RX ADMIN — Medication 10 MILLIGRAM(S): at 21:46

## 2020-10-31 RX ADMIN — Medication 81 MILLIGRAM(S): at 12:40

## 2020-10-31 RX ADMIN — Medication 4 PUFF(S): at 13:57

## 2020-10-31 RX ADMIN — FAMOTIDINE 20 MILLIGRAM(S): 10 INJECTION INTRAVENOUS at 12:40

## 2020-10-31 RX ADMIN — MIDAZOLAM HYDROCHLORIDE 2 MILLIGRAM(S): 1 INJECTION, SOLUTION INTRAMUSCULAR; INTRAVENOUS at 23:12

## 2020-10-31 RX ADMIN — ALBUTEROL 4 PUFF(S): 90 AEROSOL, METERED ORAL at 20:15

## 2020-10-31 RX ADMIN — Medication 1 PACKET(S): at 21:46

## 2020-10-31 RX ADMIN — SODIUM CHLORIDE 75 MILLILITER(S): 9 INJECTION, SOLUTION INTRAVENOUS at 05:13

## 2020-10-31 RX ADMIN — ENOXAPARIN SODIUM 40 MILLIGRAM(S): 100 INJECTION SUBCUTANEOUS at 12:40

## 2020-10-31 RX ADMIN — FENTANYL CITRATE 1 PATCH: 50 INJECTION INTRAVENOUS at 19:52

## 2020-10-31 RX ADMIN — PIPERACILLIN AND TAZOBACTAM 25 GRAM(S): 4; .5 INJECTION, POWDER, LYOPHILIZED, FOR SOLUTION INTRAVENOUS at 18:32

## 2020-10-31 RX ADMIN — Medication 10 MILLIGRAM(S): at 22:48

## 2020-10-31 RX ADMIN — Medication 4 PUFF(S): at 02:41

## 2020-10-31 RX ADMIN — ALBUTEROL 4 PUFF(S): 90 AEROSOL, METERED ORAL at 02:42

## 2020-10-31 RX ADMIN — Medication 1 GRAM(S): at 05:12

## 2020-10-31 RX ADMIN — Medication 1 TABLET(S): at 05:12

## 2020-10-31 RX ADMIN — CHLORHEXIDINE GLUCONATE 1 APPLICATION(S): 213 SOLUTION TOPICAL at 12:41

## 2020-10-31 RX ADMIN — ALBUTEROL 4 PUFF(S): 90 AEROSOL, METERED ORAL at 08:00

## 2020-10-31 RX ADMIN — Medication 1 GRAM(S): at 18:32

## 2020-10-31 RX ADMIN — Medication 1 PACKET(S): at 01:24

## 2020-10-31 RX ADMIN — Medication 4 PUFF(S): at 08:00

## 2020-10-31 RX ADMIN — Medication 50 MILLILITER(S): at 01:45

## 2020-10-31 RX ADMIN — Medication 4 PUFF(S): at 20:14

## 2020-10-31 RX ADMIN — Medication 1 ENEMA: at 21:46

## 2020-10-31 RX ADMIN — PIPERACILLIN AND TAZOBACTAM 25 GRAM(S): 4; .5 INJECTION, POWDER, LYOPHILIZED, FOR SOLUTION INTRAVENOUS at 10:49

## 2020-10-31 RX ADMIN — ALBUTEROL 4 PUFF(S): 90 AEROSOL, METERED ORAL at 13:57

## 2020-10-31 RX ADMIN — SODIUM CHLORIDE 75 MILLILITER(S): 9 INJECTION, SOLUTION INTRAVENOUS at 18:34

## 2020-10-31 RX ADMIN — FENTANYL CITRATE 1 PATCH: 50 INJECTION INTRAVENOUS at 07:14

## 2020-10-31 RX ADMIN — PIPERACILLIN AND TAZOBACTAM 25 GRAM(S): 4; .5 INJECTION, POWDER, LYOPHILIZED, FOR SOLUTION INTRAVENOUS at 01:39

## 2020-10-31 NOTE — CONSULT NOTE ADULT - ASSESSMENT
75 yo Female w Med is significant for Advanced dementia ,s/p  Aphasic stroke  Constipation  ,COVID-19   ,CVA (cerebral vascular accident)  ,Dementia of frontal lobe type  ,Diabetes mellitus  ,DM (diabetes mellitus)  ,GERD (gastroesophageal reflux disease)  HTN (hypertension)  ,Hypertension  ,Pneumonia  ,Quadriplegia  ,Respiratory failure ,s/p tracheostomy and peg .Found to have RYAN ,hypernatremia and lactate elevated leukocytosis an concern for sepsis req intubation and ICU level care

## 2020-10-31 NOTE — PROGRESS NOTE ADULT - SUBJECTIVE AND OBJECTIVE BOX
Date/Time Patient Seen:  		  Referring MD:   Data Reviewed	       Patient is a 74y old  Female who presents with a chief complaint of urosepsis (30 Oct 2020 10:40)      Subjective/HPI     PAST MEDICAL & SURGICAL HISTORY:  Pneumonia    Quadriplegia    COVID-19 virus detected    Advanced dementia    DM (diabetes mellitus)    HTN (hypertension)    CVA (cerebral vascular accident)    Respiratory failure    Constipation    GERD (gastroesophageal reflux disease)    Hypertension    Respiratory failure    Diabetes mellitus    Aphasic stroke    Dementia of frontal lobe type    Feeding by G-tube    Tracheostomy tube present    Tracheostomy in place    Gastrostomy in place    Hx of appendectomy          Medication list         MEDICATIONS  (STANDING):  ALBUTerol    90 MICROgram(s) HFA Inhaler 4 Puff(s) Inhalation every 6 hours  aspirin  chewable 81 milliGRAM(s) Oral daily  calcium carbonate 1250 mG  + Vitamin D (OsCal 500 + D) 1 Tablet(s) Oral two times a day  chlorhexidine 2% Cloths 1 Application(s) Topical daily  dextrose 5%. 1000 milliLiter(s) (50 mL/Hr) IV Continuous <Continuous>  enoxaparin Injectable 40 milliGRAM(s) SubCutaneous daily  famotidine    Tablet 20 milliGRAM(s) Oral daily  fentaNYL   Patch  12 MICROgram(s)/Hr 1 Patch Transdermal every 72 hours  insulin lispro (ADMELOG) corrective regimen sliding scale   SubCutaneous every 6 hours  ipratropium 17 MICROgram(s) HFA Inhaler 4 Puff(s) Inhalation every 6 hours  lactated ringers. 1000 milliLiter(s) (75 mL/Hr) IV Continuous <Continuous>  lactobacillus acidophilus 1 Tablet(s) Oral two times a day  piperacillin/tazobactam IVPB.. 3.375 Gram(s) IV Intermittent every 8 hours  psyllium Powder 1 Packet(s) Oral at bedtime  saline laxative (FLEET) Rectal Enema 1 Enema Rectal at bedtime  sucralfate 1 Gram(s) Oral two times a day  tobramycin for Nebulization 300 milliGRAM(s) Inhalation every 12 hours    MEDICATIONS  (PRN):  atropine 1% Ophthalmic Solution for SubLingual Use 1 Drop(s) SubLingual three times a day PRN salicary secretion  dextrose 40% Gel 15 Gram(s) Oral once PRN Blood Glucose LESS THAN 70 milliGRAM(s)/deciliter  glucagon  Injectable 1 milliGRAM(s) IntraMuscular once PRN Glucose LESS THAN 70 milligrams/deciliter         Vitals log        ICU Vital Signs Last 24 Hrs  T(C): 36.9 (31 Oct 2020 03:30), Max: 37.1 (30 Oct 2020 15:50)  T(F): 98.5 (31 Oct 2020 03:30), Max: 98.8 (30 Oct 2020 15:50)  HR: 59 (31 Oct 2020 06:00) (54 - 75)  BP: 119/56 (31 Oct 2020 06:00) (88/64 - 141/52)  BP(mean): 81 (31 Oct 2020 06:00) (68 - 88)  ABP: --  ABP(mean): --  RR: 14 (31 Oct 2020 06:00) (14 - 22)  SpO2: 100% (31 Oct 2020 06:00) (100% - 100%)       Mode: VC+ 1.0 I T  RR (machine): 14  TV (machine): 400  FiO2: 40  PEEP: 5  ITime: 1  MAP: 11  PIP: 35      Input and Output:  I&O's Detail    29 Oct 2020 07:01  -  30 Oct 2020 07:00  --------------------------------------------------------  IN:    IV PiggyBack: 50 mL    Lactated Ringers: 750 mL  Total IN: 800 mL    OUT:    Indwelling Catheter - Urethral (mL): 700 mL  Total OUT: 700 mL    Total NET: 100 mL      30 Oct 2020 07:01  -  31 Oct 2020 06:54  --------------------------------------------------------  IN:    Glucerna: 660 mL    Lactated Ringers: 2475 mL  Total IN: 3135 mL    OUT:    Indwelling Catheter - Urethral (mL): 1010 mL  Total OUT: 1010 mL    Total NET: 2125 mL          Lab Data                        10.0   6.31  )-----------( 156      ( 31 Oct 2020 05:07 )             32.6     10-31    149<H>  |  117<H>  |  21  ----------------------------<  118<H>  4.4   |  26  |  1.00    Ca    8.9      31 Oct 2020 05:07  Phos  2.7     10-31  Mg     2.4     10-31    TPro  7.4  /  Alb  2.9<L>  /  TBili  0.3  /  DBili  x   /  AST  28  /  ALT  22  /  AlkPhos  77  10-30    ABG - ( 29 Oct 2020 22:57 )  pH, Arterial: x     pH, Blood: 7.38  /  pCO2: 39    /  pO2: 459   / HCO3: 23    / Base Excess: -1.5  /  SaO2: 99                CARDIAC MARKERS ( 30 Oct 2020 14:23 )  .066 ng/mL / x     / x     / x     / x      CARDIAC MARKERS ( 30 Oct 2020 06:48 )  .086 ng/mL / x     / x     / x     / x            Review of Systems	      Objective     Physical Examination    heart s1s2  lung dec BS  abd soft      Pertinent Lab findings & Imaging      Annalise:  NO   Adequate UO     I&O's Detail    29 Oct 2020 07:01  -  30 Oct 2020 07:00  --------------------------------------------------------  IN:    IV PiggyBack: 50 mL    Lactated Ringers: 750 mL  Total IN: 800 mL    OUT:    Indwelling Catheter - Urethral (mL): 700 mL  Total OUT: 700 mL    Total NET: 100 mL      30 Oct 2020 07:01  -  31 Oct 2020 06:54  --------------------------------------------------------  IN:    Glucerna: 660 mL    Lactated Ringers: 2475 mL  Total IN: 3135 mL    OUT:    Indwelling Catheter - Urethral (mL): 1010 mL  Total OUT: 1010 mL    Total NET: 2125 mL               Discussed with:     Cultures:	        Radiology

## 2020-10-31 NOTE — CONSULT NOTE ADULT - PROBLEM SELECTOR PROBLEM 1
Palliative care encounter
RYAN (acute kidney injury)
Sepsis, due to unspecified organism, unspecified whether acute organ dysfunction present

## 2020-10-31 NOTE — CONSULT NOTE ADULT - PROBLEM SELECTOR RECOMMENDATION 9
pt is a 74 female with eval for sepsis - chr resp failure -    = HCP   pt is full code  on emp ABX for acute infection   cx pending  work up in progress   supportive measure and ICU care  will follow  discussed with 
ACUTE RENAL FAILURE , hypernatremia : due to prerenal sepsis hypotension   lactated ringers. 1000 milliLiter(s) (150 mL/Hr) IV  Serum creatinine is  improved . will f/u with bmp   No evidence of anemia .  Fluid status stable.  Will continue to avoid nephrotoxic drugs.  hold  diuretic. hold   ACE inhibitor. hold   ARB.
source is not entirely clear and noted rapid drop in wbc and no clear pulmonary localization.  Agree with broad spectrum abx pending clarification of clinical picture

## 2020-10-31 NOTE — PROGRESS NOTE ADULT - SUBJECTIVE AND OBJECTIVE BOX
Patient is a 74y Female whom presented to the hospital with ckd and manasa     PAST MEDICAL & SURGICAL HISTORY:  Pneumonia    Quadriplegia    COVID-19 virus detected    Advanced dementia    DM (diabetes mellitus)    HTN (hypertension)    CVA (cerebral vascular accident)    Respiratory failure    Constipation    GERD (gastroesophageal reflux disease)    Hypertension    Respiratory failure    Diabetes mellitus    Aphasic stroke    Dementia of frontal lobe type    Feeding by G-tube    Tracheostomy tube present    Tracheostomy in place    Gastrostomy in place    Hx of appendectomy        MEDICATIONS  (STANDING):  ALBUTerol    90 MICROgram(s) HFA Inhaler 4 Puff(s) Inhalation every 6 hours  aspirin 325 milliGRAM(s) Enteral Tube daily  calcium carbonate 1250 mG  + Vitamin D (OsCal 500 + D) 1 Tablet(s) Oral two times a day  chlorhexidine 2% Cloths 1 Application(s) Topical daily  dextrose 5%. 1000 milliLiter(s) (50 mL/Hr) IV Continuous <Continuous>  famotidine    Tablet 20 milliGRAM(s) Oral daily  fentaNYL   Patch  12 MICROgram(s)/Hr 1 Patch Transdermal every 72 hours  insulin lispro (ADMELOG) corrective regimen sliding scale   SubCutaneous every 6 hours  ipratropium 17 MICROgram(s) HFA Inhaler 4 Puff(s) Inhalation every 6 hours  lactated ringers. 1000 milliLiter(s) (150 mL/Hr) IV Continuous <Continuous>  lactobacillus acidophilus 1 Tablet(s) Oral two times a day  piperacillin/tazobactam IVPB.. 3.375 Gram(s) IV Intermittent every 8 hours  psyllium Powder 1 Packet(s) Oral at bedtime  sucralfate 1 Gram(s) Oral two times a day  tobramycin for Nebulization 300 milliGRAM(s) Inhalation every 12 hours      Allergies    codeine (Hives)    Intolerances        SOCIAL HISTORY:  Denies ETOh,Smoking,     FAMILY HISTORY:  No pertinent family history in first degree relatives        REVIEW OF SYSTEMS:    unable to obtained a good review system                            10.0   6.31  )-----------( 156      ( 31 Oct 2020 05:07 )             32.6       CBC Full  -  ( 31 Oct 2020 05:07 )  WBC Count : 6.31 K/uL  RBC Count : 3.63 M/uL  Hemoglobin : 10.0 g/dL  Hematocrit : 32.6 %  Platelet Count - Automated : 156 K/uL  Mean Cell Volume : 89.8 fl  Mean Cell Hemoglobin : 27.5 pg  Mean Cell Hemoglobin Concentration : 30.7 gm/dL  Auto Neutrophil # : 3.62 K/uL  Auto Lymphocyte # : 1.88 K/uL  Auto Monocyte # : 0.59 K/uL  Auto Eosinophil # : 0.17 K/uL  Auto Basophil # : 0.03 K/uL  Auto Neutrophil % : 57.3 %  Auto Lymphocyte % : 29.8 %  Auto Monocyte % : 9.4 %  Auto Eosinophil % : 2.7 %  Auto Basophil % : 0.5 %      10-31    149<H>  |  117<H>  |  21  ----------------------------<  118<H>  4.4   |  26  |  1.00    Ca    8.9      31 Oct 2020 05:07  Phos  2.7     10-31  Mg     2.4     10-31    TPro  7.4  /  Alb  2.9<L>  /  TBili  0.3  /  DBili  x   /  AST  28  /  ALT  22  /  AlkPhos  77  1030      CAPILLARY BLOOD GLUCOSE      POCT Blood Glucose.: 144 mg/dL (31 Oct 2020 18:22)  POCT Blood Glucose.: 139 mg/dL (31 Oct 2020 12:29)  POCT Blood Glucose.: 123 mg/dL (31 Oct 2020 05:16)  POCT Blood Glucose.: 145 mg/dL (31 Oct 2020 02:18)  POCT Blood Glucose.: 55 mg/dL (31 Oct 2020 01:28)      Vital Signs Last 24 Hrs  T(C): 36.8 (31 Oct 2020 15:51), Max: 37.1 (30 Oct 2020 20:29)  T(F): 98.3 (31 Oct 2020 15:51), Max: 98.7 (30 Oct 2020 20:29)  HR: 80 (31 Oct 2020 18:00) (54 - 110)  BP: 138/61 (31 Oct 2020 18:00) (88/64 - 163/81)  BP(mean): 88 (31 Oct 2020 18:00) (69 - 112)  RR: 22 (31 Oct 2020 18:00) (14 - 29)  SpO2: 100% (31 Oct 2020 18:00) (95% - 100%)    Urinalysis Basic - ( 29 Oct 2020 22:39 )    Color: Yellow / Appearance: Slightly Turbid / S.010 / pH: x  Gluc: x / Ketone: Negative  / Bili: Negative / Urobili: Negative mg/dL   Blood: x / Protein: 100 mg/dL / Nitrite: Negative   Leuk Esterase: Moderate / RBC: 6-10 /HPF / WBC 11-25   Sq Epi: x / Non Sq Epi: Few / Bacteria: Many        PT/INR - ( 29 Oct 2020 22:39 )   PT: 13.0 sec;   INR: 1.08 ratio         PTT - ( 29 Oct 2020 22:39 )  PTT:43.7 sec    PHYSICAL EXAM:    Constitutional: trached / vented ,s/p peg  and non-verbal  HEENT: conjunctive   clear   Respiratory: decrease bs b/l   Cardiovascular: S1 and S2  Gastrointestinal: BS+, soft, s/p  peg  Extremities: trace  peripheral edema

## 2020-10-31 NOTE — PROGRESS NOTE ADULT - ASSESSMENT
75 yo Female sent from [facility] with respiratory distress. No report of fever or hypoxia. Patient trached/vented ,s/p peg  and non-verbal, unable to contribute to history. Transferred to [facility] from [facility] ED for admission Admitted for septic workup and evaluation ,send blood and urine cx,serial lactate levels,monitor vitals closely hydration,monitor urine output and renal profile, iv abx initiated -s/p vanco and zosyn . BP stable upon [facility] ED arrival Med hx is significant for Advanced dementia ,s/p  Aphasic stroke  , Constipation  ,COVID-19   ,CVA (cerebral vascular accident)  ,Dementia of frontal lobe type  ,Diabetes mellitus  ,DM (diabetes mellitus)  ,GERD (gastroesophageal reflux disease)    HTN (hypertension)  ,Hypertension  ,Pneumonia  ,Quadriplegia  ,Respiratory failure ,s/p tracheostomy and peg .Found to have RYAN ,hypernatremia .

## 2020-10-31 NOTE — PROGRESS NOTE ADULT - SUBJECTIVE AND OBJECTIVE BOX
BREA BECKHAM    [facility] ICU1 18    Patient is a 74y old  Female who presents with a chief complaint of urosepsis (30 Oct 2020 10:40)       Allergies    codeine (Hives)    Intolerances        HPI:  73 yo Female sent from [facility] with respiratory distress. No report of fever or hypoxia. Patient trached/vented ,s/p peg  and non-verbal, unable to contribute to history. Transferred to [facility] from [place] ED for admission Admitted for septic workup and evaluation ,send blood and urine cx,serial lactate levels,monitor vitals closely hydration,monitor urine output and renal profile, iv abx initiated -s/p vanco and zosyn . BP stable upon [facility] ED arrival Med hx is significant for Advanced dementia ,s/p  Aphasic stroke    Constipation  ,COVID-19   ,CVA (cerebral vascular accident)  ,Dementia of frontal lobe type  ,Diabetes mellitus  ,DM (diabetes mellitus)  ,GERD (gastroesophageal reflux disease)    HTN (hypertension)  ,Hypertension  ,Pneumonia  ,Quadriplegia  ,Respiratory failure ,s/p tracheostomy and peg .Found to have RYAN ,hypernatremia and lactate elevated Admit for iv hydration,monitor renal profile and urine output,nutritionist consult,prealbumin level,serial bmp,nutritional supplements, Palliative care consult requested ,to discuss advance directives and complete MOLST  (30 Oct 2020 07:14)      PAST MEDICAL & SURGICAL HISTORY:  Pneumonia    Quadriplegia    COVID-19 virus detected    Advanced dementia    DM (diabetes mellitus)    HTN (hypertension)    CVA (cerebral vascular accident)    Respiratory failure    Constipation    GERD (gastroesophageal reflux disease)    Hypertension    Respiratory failure    Diabetes mellitus    Aphasic stroke    Dementia of frontal lobe type    Feeding by G-tube    Tracheostomy tube present    Tracheostomy in place    Gastrostomy in place    Hx of appendectomy        FAMILY HISTORY:  No pertinent family history in first degree relatives          MEDICATIONS   ALBUTerol    90 MICROgram(s) HFA Inhaler 4 Puff(s) Inhalation every 6 hours  aspirin  chewable 81 milliGRAM(s) Oral daily  atropine 1% Ophthalmic Solution for SubLingual Use 1 Drop(s) SubLingual three times a day PRN  calcium carbonate 1250 mG  + Vitamin D (OsCal 500 + D) 1 Tablet(s) Oral two times a day  chlorhexidine 2% Cloths 1 Application(s) Topical daily  dextrose 40% Gel 15 Gram(s) Oral once PRN  dextrose 5%. 1000 milliLiter(s) IV Continuous <Continuous>  enoxaparin Injectable 40 milliGRAM(s) SubCutaneous daily  famotidine    Tablet 20 milliGRAM(s) Oral daily  fentaNYL   Patch  12 MICROgram(s)/Hr 1 Patch Transdermal every 72 hours  glucagon  Injectable 1 milliGRAM(s) IntraMuscular once PRN  insulin lispro (ADMELOG) corrective regimen sliding scale   SubCutaneous every 6 hours  ipratropium 17 MICROgram(s) HFA Inhaler 4 Puff(s) Inhalation every 6 hours  lactated ringers. 1000 milliLiter(s) IV Continuous <Continuous>  lactobacillus acidophilus 1 Tablet(s) Oral two times a day  piperacillin/tazobactam IVPB.. 3.375 Gram(s) IV Intermittent every 8 hours  psyllium Powder 1 Packet(s) Oral at bedtime  saline laxative (FLEET) Rectal Enema 1 Enema Rectal at bedtime  sucralfate 1 Gram(s) Oral two times a day  tobramycin for Nebulization 300 milliGRAM(s) Inhalation every 12 hours      Vital Signs Last 24 Hrs  T(C): 36.7 (31 Oct 2020 07:35), Max: 37.1 (30 Oct 2020 15:50)  T(F): 98.1 (31 Oct 2020 07:35), Max: 98.8 (30 Oct 2020 15:50)  HR: 63 (31 Oct 2020 12:00) (54 - 73)  BP: 132/62 (31 Oct 2020 12:00) (88/64 - 132/62)  BP(mean): 89 (31 Oct 2020 12:00) (68 - 89)  RR: 14 (31 Oct 2020 12:00) (14 - 22)  SpO2: 100% (31 Oct 2020 12:00) (99% - 100%)      10-30-20 @ 07:01  -  10-31-20 @ 07:00  --------------------------------------------------------  IN: 3135 mL / OUT: 1010 mL / NET: 2125 mL        Mode: VC+ 1.00, RR (machine): 14, TV (machine): 400, FiO2: 30, PEEP: 5, ITime: 1, MAP: 9.6, PIP: 22    LABS:                        10.0   6.31  )-----------( 156      ( 31 Oct 2020 05:07 )             32.6     10-31    149<H>  |  117<H>  |  21  ----------------------------<  118<H>  4.4   |  26  |  1.00    Ca    8.9      31 Oct 2020 05:07  Phos  2.7     10-31  Mg     2.4     10-31    TPro  7.4  /  Alb  2.9<L>  /  TBili  0.3  /  DBili  x   /  AST  28  /  ALT  22  /  AlkPhos  77  10-30    PT/INR - ( 29 Oct 2020 22:39 )   PT: 13.0 sec;   INR: 1.08 ratio         PTT - ( 29 Oct 2020 22:39 )  PTT:43.7 sec  Urinalysis Basic - ( 29 Oct 2020 22:39 )    Color: Yellow / Appearance: Slightly Turbid / S.010 / pH: x  Gluc: x / Ketone: Negative  / Bili: Negative / Urobili: Negative mg/dL   Blood: x / Protein: 100 mg/dL / Nitrite: Negative   Leuk Esterase: Moderate / RBC: 6-10 /HPF / WBC 11-25   Sq Epi: x / Non Sq Epi: Few / Bacteria: Many        ABG - ( 29 Oct 2020 22:57 )  pH, Arterial: x     pH, Blood: 7.38  /  pCO2: 39    /  pO2: 459   / HCO3: 23    / Base Excess: -1.5  /  SaO2: 99                  WBC:  WBC Count: 6.31 K/uL (10-31 @ 05:07)  WBC Count: 10.22 K/uL (10-30 @ 06:48)  WBC Count: 20.99 K/uL (10-29 @ 22:39)      MICROBIOLOGY:  RECENT CULTURES:  10-31 .Sputum Sputum XXXX   Few Squamous epithelial cells per low power field  Few polymorphonuclear leukocytes per low power field  Rare Yeast like cells per oil power field  Rare Gram Negative Rods per oil power field XXXX          CARDIAC MARKERS ( 30 Oct 2020 14:23 )  .066 ng/mL / x     / x     / x     / x      CARDIAC MARKERS ( 30 Oct 2020 06:48 )  .086 ng/mL / x     / x     / x     / x            PT/INR - ( 29 Oct 2020 22:39 )   PT: 13.0 sec;   INR: 1.08 ratio         PTT - ( 29 Oct 2020 22:39 )  PTT:43.7 sec    Sodium:  Sodium, Serum: 149 mmol/L (10-31 @ 05:07)  Sodium, Serum: 150 mmol/L (10-30 @ 06:48)  Sodium, Serum: 146 mmol/L (10-29 @ 22:39)      1.00 mg/dL 10-31 @ 05:07  1.40 mg/dL 10-30 @ 06:48  2.29 mg/dL 10-29 @ 22:39      Hemoglobin:  Hemoglobin: 10.0 g/dL (10-31 @ 05:07)  Hemoglobin: 11.6 g/dL (10-30 @ 06:48)  Hemoglobin: 13.2 g/dL (10-29 @ 22:39)      Platelets: Platelet Count - Automated: 156 K/uL (10-31 @ 05:07)  Platelet Count - Automated: 196 K/uL (10-30 @ 06:48)  Platelet Count - Automated: 332 K/uL (10-29 @ 22:39)      LIVER FUNCTIONS - ( 30 Oct 2020 06:48 )  Alb: 2.9 g/dL / Pro: 7.4 g/dL / ALK PHOS: 77 U/L / ALT: 22 U/L / AST: 28 U/L / GGT: x             Urinalysis Basic - ( 29 Oct 2020 22:39 )    Color: Yellow / Appearance: Slightly Turbid / S.010 / pH: x  Gluc: x / Ketone: Negative  / Bili: Negative / Urobili: Negative mg/dL   Blood: x / Protein: 100 mg/dL / Nitrite: Negative   Leuk Esterase: Moderate / RBC: 6-10 /HPF / WBC 11-25   Sq Epi: x / Non Sq Epi: Few / Bacteria: Many        RADIOLOGY & ADDITIONAL STUDIES:

## 2020-10-31 NOTE — PROGRESS NOTE ADULT - SUBJECTIVE AND OBJECTIVE BOX
Patient is a 74y old  Female who presents with a chief complaint of urosepsis (30 Oct 2020 10:40)    24 hour events: ***    REVIEW OF SYSTEMS: unable to obtain due to mental status    T(F): 98.1 (10-31-20 @ 07:35), Max: 98.8 (10-30-20 @ 15:50)  HR: 61 (10-31-20 @ 08:08) (54 - 75)  BP: 120/56 (10-31-20 @ 07:00) (88/64 - 141/52)  RR: 15 (10-31-20 @ 07:00) (14 - 22)  SpO2: 100% (10-31-20 @ 08:08) (100% - 100%)  Wt(kg): --    Mode: VC + 1.00, RR (machine): 14, TV (machine): 400, FiO2: 30, PEEP: 5        I&O's Summary    10-30 @ 07:01  -  10-31 @ 07:00  --------------------------------------------------------  IN: 3135 mL / OUT: 1010 mL / NET: 2125 mL      PHYSICAL EXAM  General:   CNS:   HEENT:   Resp:   CVS:   Abd:   Ext:   Skin:     MEDICATIONS  piperacillin/tazobactam IVPB.. IV Intermittent  tobramycin for Nebulization Inhalation      dextrose 40% Gel Oral PRN  glucagon  Injectable IntraMuscular PRN  insulin lispro (ADMELOG) corrective regimen sliding scale SubCutaneous    ALBUTerol    90 MICROgram(s) HFA Inhaler Inhalation  ipratropium 17 MICROgram(s) HFA Inhaler Inhalation    fentaNYL   Patch  12 MICROgram(s)/Hr Transdermal      aspirin  chewable Oral  enoxaparin Injectable SubCutaneous    famotidine    Tablet Oral  psyllium Powder Oral  saline laxative (FLEET) Rectal Enema Rectal  sucralfate Oral      calcium carbonate 1250 mG  + Vitamin D (OsCal 500 + D) Oral  dextrose 5%. IV Continuous  lactated ringers. IV Continuous      atropine 1% Ophthalmic Solution for SubLingual Use SubLingual PRN  chlorhexidine 2% Cloths Topical    lactobacillus acidophilus Oral                          10.0   6.31  )-----------( 156      ( 31 Oct 2020 05:07 )             32.6       10    149<H>  |  117<H>  |  21  ----------------------------<  118<H>  4.4   |  26  |  1.00    Ca    8.9      31 Oct 2020 05:07  Phos  2.7     10-31  Mg     2.4     10-31    TPro  7.4  /  Alb  2.9<L>  /  TBili  0.3  /  DBili  x   /  AST  28  /  ALT  22  /  AlkPhos  77  10-30    Lactate 2.8           10-31 @ 05:07    Lactate 3.5           10-30 @ 14:23      CARDIAC MARKERS ( 30 Oct 2020 14:23 )  .066 ng/mL / x     / x     / x     / x      CARDIAC MARKERS ( 30 Oct 2020 06:48 )  .086 ng/mL / x     / x     / x     / x          PT/INR - ( 29 Oct 2020 22:39 )   PT: 13.0 sec;   INR: 1.08 ratio         PTT - ( 29 Oct 2020 22:39 )  PTT:43.7 sec  Urinalysis Basic - ( 29 Oct 2020 22:39 )    Color: Yellow / Appearance: Slightly Turbid / S.010 / pH: x  Gluc: x / Ketone: Negative  / Bili: Negative / Urobili: Negative mg/dL   Blood: x / Protein: 100 mg/dL / Nitrite: Negative   Leuk Esterase: Moderate / RBC: 6-10 /HPF / WBC 11-25   Sq Epi: x / Non Sq Epi: Few / Bacteria: Many      .Sputum Sputum --   Few Squamous epithelial cells per low power field  Few polymorphonuclear leukocytes per low power field  Rare Yeast like cells per oil power field  Rare Gram Negative Rods per oil power field 10-31 @ 01:25        CENTRAL LINE: N  FRANKLIN: Y                       DATE INSERTED: 10/30/2020   A-LINE: N      GLOBAL ISSUE/BEST PRACTICE  Analgesia: Y  Sedation: N  HOB elevation: yes  Stress ulcer prophylaxis: Y  VTE prophylaxis: LVX  Glycemic control: Y  Nutrition: Glucerna feeds    [facility] discussion: Y   Patient is a 74y old  Female who presents with a chief complaint of urosepsis (30 Oct 2020 10:40)    24 hour events: No acute events overnight.     REVIEW OF SYSTEMS: unable to obtain due to mental status    T(F): 98.1 (10-31-20 @ 07:35), Max: 98.8 (10-30-20 @ 15:50)  HR: 61 (10-31-20 @ 08:08) (54 - 75)  BP: 120/56 (10-31-20 @ 07:00) (88/64 - 141/52)  RR: 15 (10-31-20 @ 07:00) (14 - 22)  SpO2: 100% (10-31-20 @ 08:08) (100% - 100%)    Mode: VC + 1.00, RR (machine): 14, TV (machine): 400, FiO2: 30, PEEP: 5        I&O's Summary    10-30 @ 07:01  -  10-31 @ 07:00  --------------------------------------------------------  IN: 3135 mL / OUT: 1010 mL / NET: 2125 mL      PHYSICAL EXAM  General: frail appearing woman, awake, intubated, trached  CNS: contracted, bedbound, does not interact/ follow commands, withdraws from painful stimuli  HEENT: PERRL  Resp: clear to auscultation bilaterally  CVS: rrr, no m/r/g  Abd: soft, suprapubic tenderness, peg site clean  Ext: no extremity edema, 2+ pulses in extremities  Skin: warm & perfused, no rashes    MEDICATIONS  piperacillin/tazobactam IVPB.. IV Intermittent  tobramycin for Nebulization Inhalation      dextrose 40% Gel Oral PRN  glucagon  Injectable IntraMuscular PRN  insulin lispro (ADMELOG) corrective regimen sliding scale SubCutaneous    ALBUTerol    90 MICROgram(s) HFA Inhaler Inhalation  ipratropium 17 MICROgram(s) HFA Inhaler Inhalation    fentaNYL   Patch  12 MICROgram(s)/Hr Transdermal      aspirin  chewable Oral  enoxaparin Injectable SubCutaneous    famotidine    Tablet Oral  psyllium Powder Oral  saline laxative (FLEET) Rectal Enema Rectal  sucralfate Oral      calcium carbonate 1250 mG  + Vitamin D (OsCal 500 + D) Oral  dextrose 5%. IV Continuous  lactated ringers. IV Continuous      atropine 1% Ophthalmic Solution for SubLingual Use SubLingual PRN  chlorhexidine 2% Cloths Topical    lactobacillus acidophilus Oral                          10.0   6.31  )-----------( 156      ( 31 Oct 2020 05:07 )             32.6       10-31    149<H>  |  117<H>  |  21  ----------------------------<  118<H>  4.4   |  26  |  1.00    Ca    8.9      31 Oct 2020 05:07  Phos  2.7     10-31  Mg     2.4     10-31    TPro  7.4  /  Alb  2.9<L>  /  TBili  0.3  /  DBili  x   /  AST  28  /  ALT  22  /  AlkPhos  77  10-30    Lactate 2.8           10-31 @ 05:07    Lactate 3.5           10-30 @ 14:23      CARDIAC MARKERS ( 30 Oct 2020 14:23 )  .066 ng/mL / x     / x     / x     / x      CARDIAC MARKERS ( 30 Oct 2020 06:48 )  .086 ng/mL / x     / x     / x     / x          PT/INR - ( 29 Oct 2020 22:39 )   PT: 13.0 sec;   INR: 1.08 ratio         PTT - ( 29 Oct 2020 22:39 )  PTT:43.7 sec  Urinalysis Basic - ( 29 Oct 2020 22:39 )    Color: Yellow / Appearance: Slightly Turbid / S.010 / pH: x  Gluc: x / Ketone: Negative  / Bili: Negative / Urobili: Negative mg/dL   Blood: x / Protein: 100 mg/dL / Nitrite: Negative   Leuk Esterase: Moderate / RBC: 6-10 /HPF / WBC 11-25   Sq Epi: x / Non Sq Epi: Few / Bacteria: Many      .Sputum Sputum --   Few Squamous epithelial cells per low power field  Few polymorphonuclear leukocytes per low power field  Rare Yeast like cells per oil power field  Rare Gram Negative Rods per oil power field 10-31 @ 01:25        CENTRAL LINE: N  FRANKLIN: Y                       DATE INSERTED: 10/30/2020   A-LINE: N      GLOBAL ISSUE/BEST PRACTICE  Analgesia: Y  Sedation: N  HOB elevation: yes  Stress ulcer prophylaxis: Y  VTE prophylaxis: LVX  Glycemic control: Y  Nutrition: Glucerna feeds    [facility] discussion: Y

## 2020-10-31 NOTE — PROGRESS NOTE ADULT - SUBJECTIVE AND OBJECTIVE BOX
PROGRESS NOTE  Patient is a 74y old  Female who presents with a chief complaint of urosepsis (30 Oct 2020 10:40)  Chart and available morning labs /imaging are reviewed electronically , urgent issues addressed . More information  is being added upon completion of rounds , when more information is collected and management discussed with consultants , medical staff and social service/case management on the floor .  OVERNIGHT  24 hour events: No acute events overnight.   REVIEW OF SYSTEMS: unable to obtain due to mental status  No new issues reported by medical staff . All above noted   HPI:  73 yo Female sent from [facility] with respiratory distress. No report of fever or hypoxia. Patient trached/vented ,s/p peg  and non-verbal, unable to contribute to history. Transferred to [place] from [place] ED for admission Admitted for septic workup and evaluation ,send blood and urine cx,serial lactate levels,monitor vitals closely hydration,monitor urine output and renal profile, iv abx initiated -s/p vanco and zosyn . BP stable upon [place] ED arrival Med hx is significant for Advanced dementia ,s/p  Aphasic stroke    Constipation  ,COVID-19   ,CVA (cerebral vascular accident)  ,Dementia of frontal lobe type  ,Diabetes mellitus  ,DM (diabetes mellitus)  ,GERD (gastroesophageal reflux disease)    HTN (hypertension)  ,Hypertension  ,Pneumonia  ,Quadriplegia  ,Respiratory failure ,s/p tracheostomy and peg .Found to have RYAN ,hypernatremia and lactate elevated Admit for iv hydration,monitor renal profile and urine output,nutritionist consult,prealbumin level,serial bmp,nutritional supplements, Palliative care consult requested ,to discuss advance directives and complete MOLST  (30 Oct 2020 07:14)    PAST MEDICAL & SURGICAL HISTORY:  Pneumonia    Quadriplegia    COVID-19 virus detected    Advanced dementia    DM (diabetes mellitus)    HTN (hypertension)    CVA (cerebral vascular accident)    Respiratory failure    Constipation    GERD (gastroesophageal reflux disease)    Hypertension    Respiratory failure    Diabetes mellitus    Aphasic stroke    Dementia of frontal lobe type    Feeding by G-tube    Tracheostomy tube present    Tracheostomy in place    Gastrostomy in place    Hx of appendectomy    NOTE In accordance with  [facility] policy ICU final medical management decisions/MD orders are  determined/done only by ICU Intensivists     MEDICATIONS  (STANDING):  ALBUTerol    90 MICROgram(s) HFA Inhaler 4 Puff(s) Inhalation every 6 hours  aspirin  chewable 81 milliGRAM(s) Oral daily  calcium carbonate 1250 mG  + Vitamin D (OsCal 500 + D) 1 Tablet(s) Oral two times a day  chlorhexidine 2% Cloths 1 Application(s) Topical daily  dextrose 5%. 1000 milliLiter(s) (50 mL/Hr) IV Continuous <Continuous>  enoxaparin Injectable 40 milliGRAM(s) SubCutaneous daily  famotidine    Tablet 20 milliGRAM(s) Oral daily  fentaNYL   Patch  12 MICROgram(s)/Hr 1 Patch Transdermal every 72 hours  insulin lispro (ADMELOG) corrective regimen sliding scale   SubCutaneous every 6 hours  ipratropium 17 MICROgram(s) HFA Inhaler 4 Puff(s) Inhalation every 6 hours  lactated ringers. 1000 milliLiter(s) (75 mL/Hr) IV Continuous <Continuous>  lactobacillus acidophilus 1 Tablet(s) Oral two times a day  piperacillin/tazobactam IVPB.. 3.375 Gram(s) IV Intermittent every 8 hours  psyllium Powder 1 Packet(s) Oral at bedtime  saline laxative (FLEET) Rectal Enema 1 Enema Rectal at bedtime  sucralfate 1 Gram(s) Oral two times a day  tobramycin for Nebulization 300 milliGRAM(s) Inhalation every 12 hours    MEDICATIONS  (PRN):  atropine 1% Ophthalmic Solution for SubLingual Use 1 Drop(s) SubLingual three times a day PRN salicary secretion  dextrose 40% Gel 15 Gram(s) Oral once PRN Blood Glucose LESS THAN 70 milliGRAM(s)/deciliter  glucagon  Injectable 1 milliGRAM(s) IntraMuscular once PRN Glucose LESS THAN 70 milligrams/deciliter      OBJECTIVE    T(C): 36.7 (10-31-20 @ 07:35), Max: 37.1 (10-30-20 @ 15:50)  HR: 110 (10-31-20 @ 15:00) (54 - 110)  BP: 163/81 (10-31-20 @ 15:00) (88/64 - 163/81)  RR: 27 (10-31-20 @ 15:00) (14 - 27)  SpO2: 95% (10-31-20 @ 15:00) (95% - 100%)  Wt(kg): --  I&O's Summary    30 Oct 2020 07:01  -  31 Oct 2020 07:00  --------------------------------------------------------  IN: 3135 mL / OUT: 1010 mL / NET: 2125 mL          REVIEW OF SYSTEMS:  unobtainable due to tracheostomy status     PHYSICAL EXAM:  Appearance: NAD. VS past 24 hrs -as above   HEENT:   Moist oral mucosa. Conjunctiva clear b/l.   Neck : supple s/p trach   Respiratory: Lungs CTAB.  Gastrointestinal:  Soft, nontender. No rebound. No rigidity. BS present peg 	  Cardiovascular: RRR ,S1S2 present  Neurologic: Non-focal. Moving all extremities.  Extremities: No edema. No erythema. No calf tenderness.Contractures   Skin: No rashes, No ecchymoses, No cyanosis.	  wounds ,skin lesions-See skin assesment flow sheet   LABS:                        10.0   6.31  )-----------( 156      ( 31 Oct 2020 05:07 )             32.6     10-31    149<H>  |  117<H>  |  21  ----------------------------<  118<H>  4.4   |  26  |  1.00    Ca    8.9      31 Oct 2020 05:07  Phos  2.7     10-  Mg     2.4     10-31    TPro  7.4  /  Alb  2.9<L>  /  TBili  0.3  /  DBili  x   /  AST  28  /  ALT  22  /  AlkPhos  77  10-30    CAPILLARY BLOOD GLUCOSE      POCT Blood Glucose.: 139 mg/dL (31 Oct 2020 12:29)  POCT Blood Glucose.: 123 mg/dL (31 Oct 2020 05:16)  POCT Blood Glucose.: 145 mg/dL (31 Oct 2020 02:18)  POCT Blood Glucose.: 55 mg/dL (31 Oct 2020 01:28)  POCT Blood Glucose.: 171 mg/dL (30 Oct 2020 19:17)  POCT Blood Glucose.: 72 mg/dL (30 Oct 2020 18:24)    PT/INR - ( 29 Oct 2020 22:39 )   PT: 13.0 sec;   INR: 1.08 ratio         PTT - ( 29 Oct 2020 22:39 )  PTT:43.7 sec  Urinalysis Basic - ( 29 Oct 2020 22:39 )    Color: Yellow / Appearance: Slightly Turbid / S.010 / pH: x  Gluc: x / Ketone: Negative  / Bili: Negative / Urobili: Negative mg/dL   Blood: x / Protein: 100 mg/dL / Nitrite: Negative   Leuk Esterase: Moderate / RBC: 6-10 /HPF / WBC 11-25   Sq Epi: x / Non Sq Epi: Few / Bacteria: Many        Culture - Sputum (collected 31 Oct 2020 01:25)  Source: .Sputum Sputum  Gram Stain (31 Oct 2020 05:02):    Few Squamous epithelial cells per low power field    Few polymorphonuclear leukocytes per low power field    Rare Yeast like cells per oil power field    Rare Gram Negative Rods per oil power field    Culture - Blood (collected 30 Oct 2020 13:18)  Source: .Blood Blood-Peripheral  Preliminary Report (31 Oct 2020 14:00):    No growth to date.    Culture - Blood (collected 30 Oct 2020 13:18)  Source: .Blood Blood-Peripheral  Preliminary Report (31 Oct 2020 14:00):    No growth to date.      RADIOLOGY & ADDITIONAL TESTS:< from: Xray Chest 1 View- PORTABLE-Urgent (10.29.20 @ 23:22) >  EXAM:  XR CHEST PORTABLE URGENT 1V                                  PROCEDURE DATE:  10/29/2020          INTERPRETATION:  CLINICAL STATEMENT: Chest Pain.    TECHNIQUE: AP view of the chest.    COMPARISON: 2020    FINDINGS/  IMPRESSION:  Tracheostomy tube again noted.    Nonspecific mild opacity medial right lung base. Follow-up recommended.    No pleural effusion.    Heart size cannot be accurately assessed in this projection.    < end of copied text >     reviewed elctronically  ASSESSMENT/PLAN: 	  25minutes spent on this visit, 50% visit time spent in care co-ordination with other attendings and counselling patient  I have discussed care plan with patient and HCP,expressed understanding of problems treatment and their effect and side effects, alternatives in detail,I have asked if they have any questions and concerns and appropriately addressed them to best of my ability

## 2020-10-31 NOTE — PROGRESS NOTE ADULT - ASSESSMENT
75 yo F PMH frontotemporal dementia, aphasic stroke, chronic trach & peg, bed bound, insulin dependent DM, covid in march, HTN, recent PNA admitted for severe sepsis 2/2 uti    Neuro: no acute issues. c/w transdermal fentanyl patch  Cardio: hemodynamically stable. f/u TTE in AM. ASA 81 daily through peg tube. Strict Is/Os  Pulm: continue with mechanical ventilation. CXR clear. tobramycin 300mg q12, albuterol & ipatropium 4 puffs q6 for secretions, f/u sputum cultures  GI: glucerna feeds through peg. 250 cc free water q6 for hypernatremia. c/w fleet enema, famotidine, psyllium, sucralfate, and lactobillus  Renal: prerenal RYNA 2/2 sepsis with lactic acidosis improving with fluid administration. Will c/w IVF, lactate trending down 3.5 --> 2.8). Chronic vaughn for urinary retention  ID: urosepsis - on zosyn 3.375 q8, f/u urine sensitivities (grew proteus in past), f/u blood cultures, sputum culture  Endo: hyoglycemia likely 2/2 home basal insulin & NPO status. ISS for insulin dependent diabetes, f/u HbA1C  heme: 40 mg SQ LVX DVT ppx 75 yo F PMH frontotemporal dementia, aphasic stroke, chronic trach & peg, bed bound, insulin dependent DM, covid in march, HTN, recent PNA admitted for severe sepsis 2/2 uti    Neuro: no acute issues. c/w transdermal fentanyl patch  Cardio: hemodynamically stable. f/u TTE in AM. ASA 81 daily through peg tube. Strict Is/Os  Pulm: continue with mechanical ventilation. CXR clear. 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 Start tobramycin nebs  GI: glucerna feeds through peg. 250 cc free water q6 for hypernatremia. c/w fleet enema, famotidine, psyllium, sucralfate, and lactobillus  Renal: prerenal RYAN 2/2 sepsis with lactic acidosis improving with fluid administration. Will c/w IVF, lactate trending down 3.5 --> 2.8). Chronic vaughn for urinary retention  ID: urosepsis - on zosyn 3.375 q8, f/u urine sensitivities (grew proteus in past), f/u blood cultures, sputum culture  Endo: hyoglycemia likely 2/2 home basal insulin & NPO status. ISS for insulin dependent diabetes, f/u HbA1C  heme: 40 mg SQ LVX DVT ppx

## 2020-10-31 NOTE — CONSULT NOTE ADULT - PROBLEM SELECTOR RECOMMENDATION 4
will follow    Thank you for consulting us and involving us in the management of this most interesting and challenging case.     We will follow along in the care of this patient. Please call us at [phone] or text me directly on my cell# at [phone] with any concerns.

## 2020-10-31 NOTE — PROGRESS NOTE ADULT - SUBJECTIVE AND OBJECTIVE BOX
[facility]       HPI:  75 yo Female sent from [facility] with respiratory distress. No report of fever or hypoxia. Patient trached/vented ,s/p peg  and non-verbal, unable to contribute to history. Transferred to [place] from [facility] for admission Admitted for septic workup and evaluation ,send blood and urine cx,serial lactate levels,monitor vitals closely hydration,monitor urine output and renal profile, iv abx initiated -s/p vanco and zosyn . BP stable upon [place] ED arrival Med hx is significant for Advanced dementia ,s/p  Aphasic stroke    Constipation  ,COVID-19   ,CVA (cerebral vascular accident)  ,Dementia of frontal lobe type  ,Diabetes mellitus  ,DM (diabetes mellitus)  ,GERD (gastroesophageal reflux disease)    HTN (hypertension)  ,Hypertension  ,Pneumonia  ,Quadriplegia  ,Respiratory failure ,s/p tracheostomy and peg .Found to have RYAN ,hypernatremia and lactate elevated Admit for iv hydration,monitor renal profile and urine output,nutritionist consult,prealbumin level,serial bmp,nutritional supplements, Palliative care consult requested ,to discuss advance directives and complete MOLST  (30 Oct 2020 07:14)        SUBJECTIVE:      ALLERGIES:  Allergies    codeine (Hives)    Intolerances          MEDICATIONS  (STANDING):  ALBUTerol    90 MICROgram(s) HFA Inhaler 4 Puff(s) Inhalation every 6 hours  aspirin  chewable 81 milliGRAM(s) Oral daily  calcium carbonate 1250 mG  + Vitamin D (OsCal 500 + D) 1 Tablet(s) Oral two times a day  chlorhexidine 2% Cloths 1 Application(s) Topical daily  dextrose 5%. 1000 milliLiter(s) (50 mL/Hr) IV Continuous <Continuous>  enoxaparin Injectable 40 milliGRAM(s) SubCutaneous daily  famotidine    Tablet 20 milliGRAM(s) Oral daily  fentaNYL   Patch  12 MICROgram(s)/Hr 1 Patch Transdermal every 72 hours  insulin lispro (ADMELOG) corrective regimen sliding scale   SubCutaneous every 6 hours  ipratropium 17 MICROgram(s) HFA Inhaler 4 Puff(s) Inhalation every 6 hours  lactated ringers. 1000 milliLiter(s) (75 mL/Hr) IV Continuous <Continuous>  lactobacillus acidophilus 1 Tablet(s) Oral two times a day  piperacillin/tazobactam IVPB.. 3.375 Gram(s) IV Intermittent every 8 hours  psyllium Powder 1 Packet(s) Oral at bedtime  saline laxative (FLEET) Rectal Enema 1 Enema Rectal at bedtime  sucralfate 1 Gram(s) Oral two times a day  tobramycin for Nebulization 300 milliGRAM(s) Inhalation every 12 hours    MEDICATIONS  (PRN):  atropine 1% Ophthalmic Solution for SubLingual Use 1 Drop(s) SubLingual three times a day PRN salicary secretion  dextrose 40% Gel 15 Gram(s) Oral once PRN Blood Glucose LESS THAN 70 milliGRAM(s)/deciliter  glucagon  Injectable 1 milliGRAM(s) IntraMuscular once PRN Glucose LESS THAN 70 milligrams/deciliter      REVIEW OF SYSTEMS:  CONSTITUTIONAL: No fever,  RESPIRATORY: No cough, wheezing, shortness of breath  CARDIOVASCULAR: No chest pain, dyspnea, palpitations, dizziness, syncope, paroxysmal nocturnal dyspnea, orthopnea, or arm or leg swelling  GASTROINTESTINAL: No abdominal  or epigastric pain, nausea, vomiting,  diarrhea  NEUROLOGICAL: No headaches,  loss of strength, numbness, or tremors    Vital Signs Last 24 Hrs  T(C): 37 (31 Oct 2020 20:30), Max: 37 (31 Oct 2020 20:30)  T(F): 98.6 (31 Oct 2020 20:30), Max: 98.6 (31 Oct 2020 20:30)  HR: 72 (31 Oct 2020 20:43) (54 - 110)  BP: 132/63 (31 Oct 2020 20:00) (88/64 - 163/81)  BP(mean): 90 (31 Oct 2020 20:00) (69 - 112)  RR: 25 (31 Oct 2020 20:00) (14 - 29)  SpO2: 100% (31 Oct 2020 20:43) (95% - 100%)    PHYSICAL EXAM:  HEAD:  Atraumatic, Normocephalic  NECK: Supple and normal thyroid.  No JVD or carotid bruit.   HEART: S1, S2 regular , 1/6 soft ejection systolic murmur in mitral area , no thrill and no gallops .  PULMONARY: Bilateral vesicular breathing , few scattered ronchi and few scattered rales are present .  ABDOMEN: Soft nontender and positive bowl sounds   EXTREMITIES:  No clubbing, cyanosis, or pedal  edema  NEUROLOGICAL: AAOX3 , no focal deficit .    LABS:                        10.0   6.31  )-----------( 156      ( 31 Oct 2020 05:07 )             32.6     10-31    149<H>  |  117<H>  |  21  ----------------------------<  118<H>  4.4   |  26  |  1.00    Ca    8.9      31 Oct 2020 05:07  Phos  2.7     10-31  Mg     2.4     10-31    TPro  7.4  /  Alb  2.9<L>  /  TBili  0.3  /  DBili  x   /  AST  28  /  ALT  22  /  AlkPhos  77  10-30    CARDIAC MARKERS ( 30 Oct 2020 14:23 )  .066 ng/mL / x     / x     / x     / x      CARDIAC MARKERS ( 30 Oct 2020 06:48 )  .086 ng/mL / x     / x     / x     / x          PT/INR - ( 29 Oct 2020 22:39 )   PT: 13.0 sec;   INR: 1.08 ratio         PTT - ( 29 Oct 2020 22:39 )  PTT:43.7 sec  Urinalysis Basic - ( 29 Oct 2020 22:39 )    Color: Yellow / Appearance: Slightly Turbid / S.010 / pH: x  Gluc: x / Ketone: Negative  / Bili: Negative / Urobili: Negative mg/dL   Blood: x / Protein: 100 mg/dL / Nitrite: Negative   Leuk Esterase: Moderate / RBC: 6-10 /HPF / WBC 11-25   Sq Epi: x / Non Sq Epi: Few / Bacteria: Many      BNP      EKG:  ECHO:  IMAGING:    Assessment/Plan    Will continue to follow during hospital course and give further recommendations as needed . Thanks for your referral . if any questions please contact me at office ([phone])cell [phone]) JOSIE SON MD [place] [address]  [address]  OFFICE : [phone]  CELL : [phone]    CARDIOLOGY F/U :      HPI:  75 yo Female sent from [facility] with respiratory distress. No report of fever or hypoxia. Patient trached/vented ,s/p peg  and non-verbal, unable to contribute to history. Transferred to [place] from [place] ED for admission Admitted for septic workup and evaluation ,send blood and urine cx,serial lactate levels,monitor vitals closely hydration,monitor urine output and renal profile, iv abx initiated -s/p vanco and zosyn . BP stable upon [place] ED arrival Med hx is significant for Advanced dementia ,s/p  Aphasic stroke    Constipation  ,COVID-19   ,CVA (cerebral vascular accident)  ,Dementia of frontal lobe type  ,Diabetes mellitus  ,DM (diabetes mellitus)  ,GERD (gastroesophageal reflux disease)    HTN (hypertension)  ,Hypertension  ,Pneumonia  ,Quadriplegia  ,Respiratory failure ,s/p tracheostomy and peg .Found to have RYAN ,hypernatremia and lactate elevated Admit for iv hydration,monitor renal profile and urine output,nutritionist consult,prealbumin level,serial bmp,nutritional supplements, Palliative care consult requested ,to discuss advance directives and complete MOLST  (30 Oct 2020 07:14)        SUBJECTIVE: Patient intubated . No distress .      ALLERGIES:  Allergies    codeine (Hives)    Intolerances          MEDICATIONS  (STANDING):  ALBUTerol    90 MICROgram(s) HFA Inhaler 4 Puff(s) Inhalation every 6 hours  aspirin  chewable 81 milliGRAM(s) Oral daily  calcium carbonate 1250 mG  + Vitamin D (OsCal 500 + D) 1 Tablet(s) Oral two times a day  chlorhexidine 2% Cloths 1 Application(s) Topical daily  dextrose 5%. 1000 milliLiter(s) (50 mL/Hr) IV Continuous <Continuous>  enoxaparin Injectable 40 milliGRAM(s) SubCutaneous daily  famotidine    Tablet 20 milliGRAM(s) Oral daily  fentaNYL   Patch  12 MICROgram(s)/Hr 1 Patch Transdermal every 72 hours  insulin lispro (ADMELOG) corrective regimen sliding scale   SubCutaneous every 6 hours  ipratropium 17 MICROgram(s) HFA Inhaler 4 Puff(s) Inhalation every 6 hours  lactated ringers. 1000 milliLiter(s) (75 mL/Hr) IV Continuous <Continuous>  lactobacillus acidophilus 1 Tablet(s) Oral two times a day  piperacillin/tazobactam IVPB.. 3.375 Gram(s) IV Intermittent every 8 hours  psyllium Powder 1 Packet(s) Oral at bedtime  saline laxative (FLEET) Rectal Enema 1 Enema Rectal at bedtime  sucralfate 1 Gram(s) Oral two times a day  tobramycin for Nebulization 300 milliGRAM(s) Inhalation every 12 hours    MEDICATIONS  (PRN):  atropine 1% Ophthalmic Solution for SubLingual Use 1 Drop(s) SubLingual three times a day PRN salicary secretion  dextrose 40% Gel 15 Gram(s) Oral once PRN Blood Glucose LESS THAN 70 milliGRAM(s)/deciliter  glucagon  Injectable 1 milliGRAM(s) IntraMuscular once PRN Glucose LESS THAN 70 milligrams/deciliter      REVIEW OF SYSTEMS:  CONSTITUTIONAL: No fever,  RESPIRATORY: No cough, wheezing, shortness of breath and vent dependant .  CARDIOVASCULAR: No chest pain,  palpitations, dizziness, syncope, paroxysmal nocturnal dyspnea,  or arm or leg swelling and vent dependant .  GASTROINTESTINAL: No abdominal  or epigastric pain, nausea, vomiting,  diarrhea  NEUROLOGICAL: No headaches,  numbness, or tremors  Skin : No itching .  Hematology : No bleeding .  Endocrinology : no heat and cold intolerance .  Psychiatry : Patient is calm .  Musculoskeletal : Patient has mild chronic artheritis .    Vital Signs Last 24 Hrs  T(C): 37 (31 Oct 2020 20:30), Max: 37 (31 Oct 2020 20:30)  T(F): 98.6 (31 Oct 2020 20:30), Max: 98.6 (31 Oct 2020 20:30)  HR: 72 (31 Oct 2020 20:43) (54 - 110)  BP: 132/63 (31 Oct 2020 20:00) (88/64 - 163/81)  BP(mean): 90 (31 Oct 2020 20:00) (69 - 112)  RR: 25 (31 Oct 2020 20:00) (14 - 29)  SpO2: 100% (31 Oct 2020 20:43) (95% - 100%)    PHYSICAL EXAM:  HEAD:  Atraumatic, Normocephalic  NECK: Supple and normal thyroid.  No JVD or carotid bruit.   HEART: S1, S2 irregular , 1/6 soft ejection systolic murmur in mitral area , no thrill and no gallops .  PULMONARY: Bilateral vesicular breathing , few scattered rhonchi and few scattered rales are present .  ABDOMEN: Soft nontender and positive bowl sounds   EXTREMITIES:  No clubbing, cyanosis, or pedal  edema  NEUROLOGICAL: Confused and lethargic .  Skin : No rashes   Musculoskeletal : No joint swellings     LABS:                        10.0   6.31  )-----------( 156      ( 31 Oct 2020 05:07 )             32.6     10-31    149<H>  |  117<H>  |  21  ----------------------------<  118<H>  4.4   |  26  |  1.00    Ca    8.9      31 Oct 2020 05:07  Phos  2.7     10-31  Mg     2.4     10-31    TPro  7.4  /  Alb  2.9<L>  /  TBili  0.3  /  DBili  x   /  AST  28  /  ALT  22  /  AlkPhos  77  10-30    CARDIAC MARKERS ( 30 Oct 2020 14:23 )  .066 ng/mL / x     / x     / x     / x      CARDIAC MARKERS ( 30 Oct 2020 06:48 )  .086 ng/mL / x     / x     / x     / x          PT/INR - ( 29 Oct 2020 22:39 )   PT: 13.0 sec;   INR: 1.08 ratio         PTT - ( 29 Oct 2020 22:39 )  PTT:43.7 sec  Urinalysis Basic - ( 29 Oct 2020 22:39 )    Color: Yellow / Appearance: Slightly Turbid / S.010 / pH: x  Gluc: x / Ketone: Negative  / Bili: Negative / Urobili: Negative mg/dL   Blood: x / Protein: 100 mg/dL / Nitrite: Negative   Leuk Esterase: Moderate / RBC: 6-10 /HPF / WBC 11-25   Sq Epi: x / Non Sq Epi: Few / Bacteria: Many      Assessment/Plan  Patient has :  1) Respiratory failure and vent dependant .  2) Pneumonia   3) Urosepsis .  4) S/P hypotension and BP stable   5) H/O DM .  6) H/O GERD .  7) H/O CVA , quadriplegia .  8) Anemia   Plan : 1) Continue I/V hydration 2) I/V antibiotics . 3) Monitor hemoglobin and electrolytes .  Will continue to follow during hospital course and give further recommendations as needed . Thanks for your referral . if any questions please contact me at office ([phone])

## 2020-10-31 NOTE — CONSULT NOTE ADULT - SUBJECTIVE AND OBJECTIVE BOX
[facility] DIVISION of INFECTIOUS DISEASE  Arturo Olivas MD PhD, Haylee Umanzor MD, Andressa Brantley MD, Billy Valenzuela MD  and providing coverage with Alexa Canas MD and Eusebio Chairez MD  Providing Infectious Disease Consultations at [facility], [facility], [place], [facility]    Office# [phone] to schedule follow up appointments  Answering Service for urgent calls or New Consults [phone]  Cell# to text for urgent issues Arturo Olivas [phone]     HPI:  73 yo Female sent from [facility] with respiratory distress. No report of fever or hypoxia. Patient trached/vented ,s/p peg  and non-verbal, unable to contribute to history. Transferred to [place] from [place] ED for admission Admitted for septic workup and evaluation ,send blood and urine cx,serial lactate levels,monitor vitals closely hydration,monitor urine output and renal profile, iv abx initiated -s/p vanco and zosyn . BP stable upon [place] ED arrival Med hx is significant for Advanced dementia ,s/p  Aphasic stroke  Constipation  ,COVID-19   ,CVA (cerebral vascular accident)  ,Dementia of frontal lobe type  ,Diabetes mellitus  ,DM (diabetes mellitus)  ,GERD (gastroesophageal reflux disease)  HTN (hypertension)  ,Hypertension  ,Pneumonia  ,Quadriplegia  ,Respiratory failure ,s/p tracheostomy and peg .Found to have RYAN ,hypernatremia and lactate elevated Admit for iv hydration,monitor renal profile and urine output,nutritionist consult,prealbumin level,serial bmp,nutritional supplements, Palliative care consult requested ,to discuss advance directives and complete MOLST  (30 Oct 2020 07:14)      PAST MEDICAL & SURGICAL HISTORY:  Pneumonia  Quadriplegia  COVID-19 3/2020  Advanced dementia  DM (diabetes mellitus)  HTN (hypertension)  CVA (cerebral vascular accident)  Respiratory failure  Constipation  GERD (gastroesophageal reflux disease)  Hypertension  Respiratory failure  Diabetes mellitus  Aphasic stroke  Dementia of frontal lobe type  Feeding by G-tube  Tracheostomy tube present  Tracheostomy in place  Gastrostomy in place    Hx of appendectomy      Antimicrobials  piperacillin/tazobactam IVPB.. 3.375 Gram(s) IV Intermittent every 8 hours  tobramycin for Nebulization 300 milliGRAM(s) Inhalation every 12 hours      Immunological      Other  ALBUTerol    90 MICROgram(s) HFA Inhaler 4 Puff(s) Inhalation every 6 hours  aspirin  chewable 81 milliGRAM(s) Oral daily  atropine 1% Ophthalmic Solution for SubLingual Use 1 Drop(s) SubLingual three times a day PRN  calcium carbonate 1250 mG  + Vitamin D (OsCal 500 + D) 1 Tablet(s) Oral two times a day  chlorhexidine 2% Cloths 1 Application(s) Topical daily  dextrose 40% Gel 15 Gram(s) Oral once PRN  dextrose 5%. 1000 milliLiter(s) IV Continuous <Continuous>  enoxaparin Injectable 40 milliGRAM(s) SubCutaneous daily  famotidine    Tablet 20 milliGRAM(s) Oral daily  fentaNYL   Patch  12 MICROgram(s)/Hr 1 Patch Transdermal every 72 hours  glucagon  Injectable 1 milliGRAM(s) IntraMuscular once PRN  insulin lispro (ADMELOG) corrective regimen sliding scale   SubCutaneous every 6 hours  ipratropium 17 MICROgram(s) HFA Inhaler 4 Puff(s) Inhalation every 6 hours  lactated ringers. 1000 milliLiter(s) IV Continuous <Continuous>  lactobacillus acidophilus 1 Tablet(s) Oral two times a day  psyllium Powder 1 Packet(s) Oral at bedtime  saline laxative (FLEET) Rectal Enema 1 Enema Rectal at bedtime  sucralfate 1 Gram(s) Oral two times a day      Allergies    codeine (Hives)    Intolerances        SOCIAL HISTORY:  Social History:  prior to  admission patient resided in [place] [facility] ,no etoh or tobacco reported (30 Oct 2020 07:14)      FAMILY HISTORY:  No pertinent family history in first degree relatives        ROS:    limited as pt is nonverbal and intubated    Vital Signs Last 24 Hrs  T(C): 36.7 (31 Oct 2020 07:35), Max: 37.1 (30 Oct 2020 15:50)  T(F): 98.1 (31 Oct 2020 07:35), Max: 98.8 (30 Oct 2020 15:50)  HR: 70 (31 Oct 2020 09:00) (54 - 75)  BP: 117/53 (31 Oct 2020 09:00) (88/64 - 141/52)  BP(mean): 73 (31 Oct 2020 09:00) (68 - 88)  RR: 18 (31 Oct 2020 09:00) (14 - 22)  SpO2: 100% (31 Oct 2020 09:00) (100% - 100%)    PE:  WDWN in no distress  HEENT:  NC, PERRL, sclerae anicteric, conjunctivae clear, EOMI.  Sinuses nontender, no nasal exudate.  intubated  Neck:  Supple, no adenopathy  Lungs:  No accessory muscle use, bilaterally dec BS  Cor:  distant  Abd:  Symmetric, normoactive BS.  Soft, nontender, no masses, guarding or rebound.  Liver and spleen not enlarged  Extrem:  No cyanosis or edema  Skin:  No rashes.  Neuro: opening eyes  Musc: contracted    LABS:                        10.0   6.31  )-----------( 156      ( 31 Oct 2020 05:07 )             32.6       WBC Count: 6.31 K/uL (10-31-20 @ 05:07)  WBC Count: 10.22 K/uL (10-30-20 @ 06:48)  WBC Count: 20.99 K/uL (10-29-20 @ 22:39)      10-31    149<H>  |  117<H>  |  21  ----------------------------<  118<H>  4.4   |  26  |  1.00    Ca    8.9      31 Oct 2020 05:07  Phos  2.7     10-31  Mg     2.4     10-31    TPro  7.4  /  Alb  2.9<L>  /  TBili  0.3  /  DBili  x   /  AST  28  /  ALT  22  /  AlkPhos  77  10-30      Creatinine, Serum: 1.00 mg/dL (10-31-20 @ 05:07)  Creatinine, Serum: 1.40 mg/dL (10-30-20 @ 06:48)  Creatinine, Serum: 2.29 mg/dL (10-29-20 @ 22:39)      Urinalysis Basic - ( 29 Oct 2020 22:39 )    Color: Yellow / Appearance: Slightly Turbid / S.010 / pH: x  Gluc: x / Ketone: Negative  / Bili: Negative / Urobili: Negative mg/dL   Blood: x / Protein: 100 mg/dL / Nitrite: Negative   Leuk Esterase: Moderate / RBC: 6-10 /HPF / WBC 11-25   Sq Epi: x / Non Sq Epi: Few / Bacteria: Many              MICROBIOLOGY:    COVID-19  Antibody - for prior infection screening (10.30.20 @ 11:47)    COVID-19 IgG Antibody Index: 249.00:     COVID-19  Antibody - for prior infection screening (20 @ 10:21)    COVID-19 IgG Antibody Index: 4.50:     Culture - Sputum . (10.31.20 @ 01:25)    Gram Stain:   Few Squamous epithelial cells per low power field  Few polymorphonuclear leukocytes per low power field  Rare Yeast like cells per oil power field  Rare Gram Negative Rods per oil power field    Specimen Source: .Sputum Sputum        RADIOLOGY & ADDITIONAL STUDIES:    --< from: Xray Chest 1 View- PORTABLE-Urgent (10.29.20 @ 23:22) >  EXAM:  XR CHEST PORTABLE URGENT 1V                                  PROCEDURE DATE:  10/29/2020          INTERPRETATION:  CLINICAL STATEMENT: Chest Pain.    TECHNIQUE: AP view of the chest.    COMPARISON: 2020    FINDINGS/  IMPRESSION:  Tracheostomy tube again noted.    Nonspecific mild opacity medial right lung base. Follow-up recommended.    No pleural effusion.    Heart size cannot be accurately assessed in this projection.

## 2020-10-31 NOTE — PROGRESS NOTE ADULT - ASSESSMENT
73 yo Female sent from [facility] with respiratory distress. No report of fever or hypoxia. Patient trached/vented ,s/p peg  and non-verbal, unable to contribute to history. Transferred to [place] from [place] ED for admission Admitted for septic workup and evaluation ,send blood and urine cx,serial lactate levels,monitor vitals closely hydration,monitor urine output and renal profile, iv abx initiated -s/p vanco and zosyn . BP stable upon [place] ED arrival Med hx is significant for Advanced dementia ,s/p  Aphasic stroke    Constipation  ,COVID-19   ,CVA (cerebral vascular accident)  ,Dementia of frontal lobe type  ,Diabetes mellitus  ,DM (diabetes mellitus)  ,GERD (gastroesophageal reflux disease)    HTN (hypertension)  ,Hypertension  ,Pneumonia  ,Quadriplegia  ,Respiratory failure s/p tracheostomy and peg .Found to have RYAN ,hypernatremia and lactate elevated Admit for iv hydration,monitor renal profile and urine output,nutritionist consult,prealbumin level,serial bmp,nutritional supplements Palliative care consult requested ,to discuss advance directives and complete MOLST

## 2020-10-31 NOTE — PROGRESS NOTE ADULT - ASSESSMENT
cont rx   cont rx      [facility]  DOA 10/30/2020 DR ILIANA KEMP       REVIEW OF SYMPTOMS      Able to give ROS  NO     PHYSICAL EXAM    HEENT Unremarkable PERRLA atraumatic   RESP Fair air entry EXP prolonged    Harsh breath sound Resp distres mild   CARDIAC S1 S2 No S3     NO JVD    ABDOMEN SOFT BS PRESENT NOT DISTENDED No hepatosplenomegaly PEDAL EDEMA present No calf tenderness  NO rash     PT DATA/BEST PRACTICE  ALLERGY        codeine       WT                             10/30/2020 56   BMI                                  10/30/2020 22      ADVANCED DIRECTIVE     HEAD OF BED ELEVATION Yes      DVT PROPHYLAXIS.   lvnx 30 (10/30)      DIET. glucerna 1.5 1000 (10/3)                                                                 SQUIRES PROPHYLAXIS. FAMOTIDINE 20 (10/30)  INFECTION PPLX          chlorhexidine 4% (10/30)                                           OXYGENATION.         VITALS.   10/31/2020 afeb 71 118/57   10/31/2020 2p cpap 5/8/.3 svt 450 r 27 rsbi 60     IV F.    (10/31)       MICROBIO.   COVID pcr 10/30/2020 pcr negv   COVID 10/30 igg 249 posv  Ua 10/29/2020  1010 l estr mod w 11-25 r 6-10   URINE C 10/30 100k gnr    BLOD c 10/30 bc negv           ANTIBIO.   Zosyn (10/30)   Tobramycin for nebu 300.2 (10/30) (Dr Umanzor)   ABG     10/29 10p /12/5/100% 738/39/459         LABS.   W 10/29-10/30-10/31/2020 w 20.9-10.2 -6.3  la 10/29-10/30-10/31 la 8.7-3.8 -2.8  Tr 10/30/2020 Tr .086   BNP 10/30/2020 1346   Hb 10/29-10/30-10/31/2020 Hb 13.2-11.6-10   inr 10/29 inr 1   Na 10/29-10/30-10/31 Na 146-150 -149  Cr 10/29-10/30-10/31 2.2-1.4 -1            PATIENT SUMMARY.   74 yr female hx of dm, quadaplegic,  resp failure on chronic vent , htn, peg, CVA, advanced dementia, recent admission for +++COVID , d/c to vent facility with neg covid, now with resp distress, sepsis, hypotensive  , most likely UTI  sepsis   Pulm consulted 10/30    COURSE.      VENT 10/31/2020 Placed on cpap rsbi 60   LACTICEMIA Improved with fluids   UTI 10/30 uc 100k gnr 10.29 ua 11-25 w Rxed with zosyn (10/30)   PNEUMONIA CXR 10/30 nonsp mild opac medial r base Rxed with .2 (10/30)   HYPERNATREMIA Improved with fluids   RYAN Improved with fluids   DIET tf started 10/30     PROBLEM/ASSESSMENT/RECOMMENDATIONS.  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

## 2020-11-01 LAB
-  AMIKACIN: SIGNIFICANT CHANGE UP
-  AMOXICILLIN/CLAVULANIC ACID: SIGNIFICANT CHANGE UP
-  AMPICILLIN/SULBACTAM: SIGNIFICANT CHANGE UP
-  AMPICILLIN: SIGNIFICANT CHANGE UP
-  AZTREONAM: SIGNIFICANT CHANGE UP
-  CEFAZOLIN: SIGNIFICANT CHANGE UP
-  CEFEPIME: SIGNIFICANT CHANGE UP
-  CEFOXITIN: SIGNIFICANT CHANGE UP
-  CEFTRIAXONE: SIGNIFICANT CHANGE UP
-  CIPROFLOXACIN: SIGNIFICANT CHANGE UP
-  ERTAPENEM: SIGNIFICANT CHANGE UP
-  GENTAMICIN: SIGNIFICANT CHANGE UP
-  LEVOFLOXACIN: SIGNIFICANT CHANGE UP
-  MEROPENEM: SIGNIFICANT CHANGE UP
-  NITROFURANTOIN: SIGNIFICANT CHANGE UP
-  PIPERACILLIN/TAZOBACTAM: SIGNIFICANT CHANGE UP
-  TOBRAMYCIN: SIGNIFICANT CHANGE UP
-  TRIMETHOPRIM/SULFAMETHOXAZOLE: SIGNIFICANT CHANGE UP
ALBUMIN SERPL ELPH-MCNC: 2.8 G/DL — LOW (ref 3.3–5)
ALP SERPL-CCNC: 74 U/L — SIGNIFICANT CHANGE UP (ref 40–120)
ALT FLD-CCNC: 34 U/L — SIGNIFICANT CHANGE UP (ref 12–78)
ANION GAP SERPL CALC-SCNC: 7 MMOL/L — SIGNIFICANT CHANGE UP (ref 5–17)
AST SERPL-CCNC: 39 U/L — HIGH (ref 15–37)
BASOPHILS # BLD AUTO: 0.05 K/UL — SIGNIFICANT CHANGE UP (ref 0–0.2)
BASOPHILS NFR BLD AUTO: 0.4 % — SIGNIFICANT CHANGE UP (ref 0–2)
BILIRUB SERPL-MCNC: 0.6 MG/DL — SIGNIFICANT CHANGE UP (ref 0.2–1.2)
BUN SERPL-MCNC: 20 MG/DL — SIGNIFICANT CHANGE UP (ref 7–23)
CALCIUM SERPL-MCNC: 9.1 MG/DL — SIGNIFICANT CHANGE UP (ref 8.5–10.1)
CHLORIDE SERPL-SCNC: 110 MMOL/L — HIGH (ref 96–108)
CO2 SERPL-SCNC: 30 MMOL/L — SIGNIFICANT CHANGE UP (ref 22–31)
CREAT SERPL-MCNC: 1 MG/DL — SIGNIFICANT CHANGE UP (ref 0.5–1.3)
CULTURE RESULTS: SIGNIFICANT CHANGE UP
EOSINOPHIL # BLD AUTO: 0.08 K/UL — SIGNIFICANT CHANGE UP (ref 0–0.5)
EOSINOPHIL NFR BLD AUTO: 0.7 % — SIGNIFICANT CHANGE UP (ref 0–6)
GLUCOSE SERPL-MCNC: 118 MG/DL — HIGH (ref 70–99)
HCT VFR BLD CALC: 36.8 % — SIGNIFICANT CHANGE UP (ref 34.5–45)
HGB BLD-MCNC: 11.3 G/DL — LOW (ref 11.5–15.5)
IMM GRANULOCYTES NFR BLD AUTO: 1.8 % — HIGH (ref 0–1.5)
LYMPHOCYTES # BLD AUTO: 18.7 % — SIGNIFICANT CHANGE UP (ref 13–44)
LYMPHOCYTES # BLD AUTO: 2.2 K/UL — SIGNIFICANT CHANGE UP (ref 1–3.3)
MAGNESIUM SERPL-MCNC: 2.3 MG/DL — SIGNIFICANT CHANGE UP (ref 1.6–2.6)
MCHC RBC-ENTMCNC: 27 PG — SIGNIFICANT CHANGE UP (ref 27–34)
MCHC RBC-ENTMCNC: 30.7 GM/DL — LOW (ref 32–36)
MCV RBC AUTO: 88 FL — SIGNIFICANT CHANGE UP (ref 80–100)
METHOD TYPE: SIGNIFICANT CHANGE UP
MONOCYTES # BLD AUTO: 0.99 K/UL — HIGH (ref 0–0.9)
MONOCYTES NFR BLD AUTO: 8.4 % — SIGNIFICANT CHANGE UP (ref 2–14)
NEUTROPHILS # BLD AUTO: 8.21 K/UL — HIGH (ref 1.8–7.4)
NEUTROPHILS NFR BLD AUTO: 70 % — SIGNIFICANT CHANGE UP (ref 43–77)
NRBC # BLD: 0 /100 WBCS — SIGNIFICANT CHANGE UP (ref 0–0)
ORGANISM # SPEC MICROSCOPIC CNT: SIGNIFICANT CHANGE UP
ORGANISM # SPEC MICROSCOPIC CNT: SIGNIFICANT CHANGE UP
PHOSPHATE SERPL-MCNC: 3.5 MG/DL — SIGNIFICANT CHANGE UP (ref 2.5–4.5)
PLATELET # BLD AUTO: 197 K/UL — SIGNIFICANT CHANGE UP (ref 150–400)
POTASSIUM SERPL-MCNC: 3.7 MMOL/L — SIGNIFICANT CHANGE UP (ref 3.5–5.3)
POTASSIUM SERPL-SCNC: 3.7 MMOL/L — SIGNIFICANT CHANGE UP (ref 3.5–5.3)
PROT SERPL-MCNC: 7.1 G/DL — SIGNIFICANT CHANGE UP (ref 6–8.3)
RBC # BLD: 4.18 M/UL — SIGNIFICANT CHANGE UP (ref 3.8–5.2)
RBC # FLD: 14.5 % — SIGNIFICANT CHANGE UP (ref 10.3–14.5)
SODIUM SERPL-SCNC: 147 MMOL/L — HIGH (ref 135–145)
SPECIMEN SOURCE: SIGNIFICANT CHANGE UP
WBC # BLD: 11.74 K/UL — HIGH (ref 3.8–10.5)
WBC # FLD AUTO: 11.74 K/UL — HIGH (ref 3.8–10.5)

## 2020-11-01 PROCEDURE — 93010 ELECTROCARDIOGRAM REPORT: CPT

## 2020-11-01 PROCEDURE — 99233 SBSQ HOSP IP/OBS HIGH 50: CPT | Mod: GC

## 2020-11-01 RX ORDER — POTASSIUM CHLORIDE 20 MEQ
40 PACKET (EA) ORAL ONCE
Refills: 0 | Status: COMPLETED | OUTPATIENT
Start: 2020-11-01 | End: 2020-11-01

## 2020-11-01 RX ORDER — MIDAZOLAM HYDROCHLORIDE 1 MG/ML
2 INJECTION, SOLUTION INTRAMUSCULAR; INTRAVENOUS ONCE
Refills: 0 | Status: DISCONTINUED | OUTPATIENT
Start: 2020-11-01 | End: 2020-11-01

## 2020-11-01 RX ADMIN — Medication 1 TABLET(S): at 06:02

## 2020-11-01 RX ADMIN — Medication 4 PUFF(S): at 08:12

## 2020-11-01 RX ADMIN — Medication 300 MILLIGRAM(S): at 19:58

## 2020-11-01 RX ADMIN — MIDAZOLAM HYDROCHLORIDE 2 MILLIGRAM(S): 1 INJECTION, SOLUTION INTRAMUSCULAR; INTRAVENOUS at 14:20

## 2020-11-01 RX ADMIN — SODIUM CHLORIDE 75 MILLILITER(S): 9 INJECTION, SOLUTION INTRAVENOUS at 06:08

## 2020-11-01 RX ADMIN — Medication 1 GRAM(S): at 06:02

## 2020-11-01 RX ADMIN — Medication 2: at 00:19

## 2020-11-01 RX ADMIN — CHLORHEXIDINE GLUCONATE 1 APPLICATION(S): 213 SOLUTION TOPICAL at 12:10

## 2020-11-01 RX ADMIN — ALBUTEROL 4 PUFF(S): 90 AEROSOL, METERED ORAL at 19:59

## 2020-11-01 RX ADMIN — ALBUTEROL 4 PUFF(S): 90 AEROSOL, METERED ORAL at 01:18

## 2020-11-01 RX ADMIN — Medication 4 PUFF(S): at 19:59

## 2020-11-01 RX ADMIN — Medication 1 GRAM(S): at 17:24

## 2020-11-01 RX ADMIN — FENTANYL CITRATE 1 PATCH: 50 INJECTION INTRAVENOUS at 07:19

## 2020-11-01 RX ADMIN — PIPERACILLIN AND TAZOBACTAM 25 GRAM(S): 4; .5 INJECTION, POWDER, LYOPHILIZED, FOR SOLUTION INTRAVENOUS at 10:27

## 2020-11-01 RX ADMIN — FENTANYL CITRATE 1 PATCH: 50 INJECTION INTRAVENOUS at 19:46

## 2020-11-01 RX ADMIN — PIPERACILLIN AND TAZOBACTAM 25 GRAM(S): 4; .5 INJECTION, POWDER, LYOPHILIZED, FOR SOLUTION INTRAVENOUS at 17:24

## 2020-11-01 RX ADMIN — PIPERACILLIN AND TAZOBACTAM 25 GRAM(S): 4; .5 INJECTION, POWDER, LYOPHILIZED, FOR SOLUTION INTRAVENOUS at 01:32

## 2020-11-01 RX ADMIN — Medication 1 TABLET(S): at 17:24

## 2020-11-01 RX ADMIN — ALBUTEROL 4 PUFF(S): 90 AEROSOL, METERED ORAL at 13:40

## 2020-11-01 RX ADMIN — Medication 4 PUFF(S): at 13:40

## 2020-11-01 RX ADMIN — ENOXAPARIN SODIUM 40 MILLIGRAM(S): 100 INJECTION SUBCUTANEOUS at 12:09

## 2020-11-01 RX ADMIN — FAMOTIDINE 20 MILLIGRAM(S): 10 INJECTION INTRAVENOUS at 12:09

## 2020-11-01 RX ADMIN — Medication 81 MILLIGRAM(S): at 12:09

## 2020-11-01 RX ADMIN — Medication 1 ENEMA: at 21:51

## 2020-11-01 RX ADMIN — Medication 40 MILLIEQUIVALENT(S): at 12:09

## 2020-11-01 RX ADMIN — Medication 1 PACKET(S): at 21:49

## 2020-11-01 RX ADMIN — Medication 300 MILLIGRAM(S): at 08:13

## 2020-11-01 RX ADMIN — ALBUTEROL 4 PUFF(S): 90 AEROSOL, METERED ORAL at 08:12

## 2020-11-01 RX ADMIN — Medication 4 PUFF(S): at 01:18

## 2020-11-01 RX ADMIN — Medication 2: at 12:11

## 2020-11-01 NOTE — PROGRESS NOTE ADULT - NUTRITIONAL ASSESSMENT
MEDICATIONS  (STANDING):  ALBUTerol    90 MICROgram(s) HFA Inhaler 4 Puff(s) Inhalation every 6 hours  aspirin 325 milliGRAM(s) Enteral Tube daily  calcium carbonate 1250 mG  + Vitamin D (OsCal 500 + D) 1 Tablet(s) Oral two times a day  chlorhexidine 2% Cloths 1 Application(s) Topical daily  dextrose 5%. 1000 milliLiter(s) (50 mL/Hr) IV Continuous <Continuous>  famotidine    Tablet 20 milliGRAM(s) Oral daily  fentaNYL   Patch  12 MICROgram(s)/Hr 1 Patch Transdermal every 72 hours  insulin lispro (ADMELOG) corrective regimen sliding scale   SubCutaneous every 6 hours  ipratropium 17 MICROgram(s) HFA Inhaler 4 Puff(s) Inhalation every 6 hours  lactated ringers. 1000 milliLiter(s) (150 mL/Hr) IV Continuous <Continuous>  lactobacillus acidophilus 1 Tablet(s) Oral two times a day  piperacillin/tazobactam IVPB.. 3.375 Gram(s) IV Intermittent every 8 hours  psyllium Powder 1 Packet(s) Oral at bedtime  sucralfate 1 Gram(s) Oral two times a day  tobramycin for Nebulization 300 milliGRAM(s) Inhalation every 12 hours    MEDICATIONS  (PRN):  atropine 1% Ophthalmic Solution for SubLingual Use 1 Drop(s) SubLingual three times a day PRN salicary secretion  dextrose 40% Gel 15 Gram(s) Oral once PRN Blood Glucose LESS THAN 70 milliGRAM(s)/deciliter  glucagon  Injectable 1 milliGRAM(s) IntraMuscular once PRN Glucose LESS THAN 70 milligrams/deciliter
MEDICATIONS  (STANDING):  ALBUTerol    90 MICROgram(s) HFA Inhaler 4 Puff(s) Inhalation every 6 hours  aspirin 325 milliGRAM(s) Enteral Tube daily  calcium carbonate 1250 mG  + Vitamin D (OsCal 500 + D) 1 Tablet(s) Oral two times a day  chlorhexidine 2% Cloths 1 Application(s) Topical daily  dextrose 5%. 1000 milliLiter(s) (50 mL/Hr) IV Continuous <Continuous>  famotidine    Tablet 20 milliGRAM(s) Oral daily  fentaNYL   Patch  12 MICROgram(s)/Hr 1 Patch Transdermal every 72 hours  insulin lispro (ADMELOG) corrective regimen sliding scale   SubCutaneous every 6 hours  ipratropium 17 MICROgram(s) HFA Inhaler 4 Puff(s) Inhalation every 6 hours  lactated ringers. 1000 milliLiter(s) (150 mL/Hr) IV Continuous <Continuous>  lactobacillus acidophilus 1 Tablet(s) Oral two times a day  piperacillin/tazobactam IVPB.. 3.375 Gram(s) IV Intermittent every 8 hours  psyllium Powder 1 Packet(s) Oral at bedtime  sucralfate 1 Gram(s) Oral two times a day  tobramycin for Nebulization 300 milliGRAM(s) Inhalation every 12 hours    MEDICATIONS  (PRN):  atropine 1% Ophthalmic Solution for SubLingual Use 1 Drop(s) SubLingual three times a day PRN salicary secretion  dextrose 40% Gel 15 Gram(s) Oral once PRN Blood Glucose LESS THAN 70 milliGRAM(s)/deciliter  glucagon  Injectable 1 milliGRAM(s) IntraMuscular once PRN Glucose LESS THAN 70 milligrams/deciliter

## 2020-11-01 NOTE — PROGRESS NOTE ADULT - ASSESSMENT
cont rx   cont rx        [facility]  DOA 10/30/2020 DR ILIANA KEMP       REVIEW OF SYMPTOMS      Able to give ROS  NO     PHYSICAL EXAM    HEENT Unremarkable PERRLA atraumatic   RESP Fair air entry EXP prolonged    Harsh breath sound Resp distres mild   CARDIAC S1 S2 No S3     NO JVD    ABDOMEN SOFT BS PRESENT NOT DISTENDED No hepatosplenomegaly PEDAL EDEMA present No calf tenderness  NO rash     PT DATA/BEST PRACTICE  ALLERGY        codeine       WT                             10/30/2020 56   BMI                                  10/30/2020 22      ADVANCED DIRECTIVE     HEAD OF BED ELEVATION Yes      DVT PROPHYLAXIS.   LVNX 30 (10/30) --> lvnx 40 (10/31)   DIET. glucerna 1.5 1000 (10/3)   FREE WATER 250.4 (11/1) (Dr NICHOLAS)                                                                 SQUIRES PROPHYLAXIS. FAMOTIDINE 20 (10/30)  INFECTION PPLX          chlorhexidine 4% (10/30)                                               DYSPHAGIA EVAL .                                                                              PROCEDURES.   Woody (11/1  LINES TUBES WIRES POA 10/30/2020.  TRACH  PEG    OXYGENATION.         VITALS.   11/1/2020 afeb 60 120/50   11/1/2020 ac 14/400/5/.3 pip 34       MICROBIO.   COVID pcr 10/30/2020 pcr negv   COVID 10/30 igg 249 posv  Ua 10/29/2020  1010 l estr mod w 11-25 r 6-10   URINE C 10/30 100k proteus     BLOD c 10/30 bc negv   SPOUTUM 10/31 ssp gnr           LABS.   W 10/29-10/30-10/31-11/1/2020 w 20.9-10.2 -6.3-11.7  la 10/29-10/30-10/31 la 8.7-3.8 -2.8  Tr 10/30/2020 Tr .086   BNP 10/30/2020 1346   Hb 10/29-10/30-10/31/2020 Hb 13.2-11.6-10   inr 10/29 inr 1   Na 10/29-10/30-10/31 -11/1 Na 146-150 -149 - 147   Cr 10/29-10/30-10/31-11/1 Cr 2.2-1.4 -1-1       IMAGING.   CXr 11/1/2020 cxr lungs clear       PATIENT SUMMARY.   74 yr female hx of dm, quadaplegic,  resp failure on chronic vent , htn, peg, CVA, advanced dementia, recent admission for +++COVID , d/c to vent facility with neg covid, now with resp distress, sepsis, hypotensive  , most likely UTI  sepsis   Pulm consulted 10/30    PROBLEM/ASSESSMENT/RECOMMENDATIONS.  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 On asa   DIET Started peg feeds 10/31  PEG CARE   HYPERNATREMIA  On fluids Monitorff started 11/1/2020   RYAN Improving on  fluids       OVERALL PLAN  FREE WATER For hyperNa   ZOSYN TOBU For Proteus uti poss vent pneumonia       TIME SPENT   Over 25 minutes aggregate care time spent on encounter; activities included   direct patient care, counseling and/or coordinating care reviewing notes, lab data/ imaging , discussion with multidisciplinary team/ patient  /family and explaining in detail risks, benefits, alternatives  of the recommendations     [facility]  DOA 10/30/2020 DR ILIANA KEMP

## 2020-11-01 NOTE — PROGRESS NOTE ADULT - ASSESSMENT
75 yo F PMH frontotemporal dementia, aphasic stroke, chronic trach & peg, bed bound, insulin dependent DM, covid in march, HTN, recent PNA admitted for severe sepsis 2/2 uti.    Neuro: no acute issues. c/w transdermal fentanyl patch  Cardio: hemodynamically stable. TTE shows EF 60-65%, Grade 1 LV diastolic dysfunction, MR, TR. ASA 81 daily through peg tube. Strict Is/Os  Pulm: continue with mechanical ventilation. CXR clear. tobramycin 300mg q12, albuterol & ipatropium 4 puffs q6 for secretions, sputum cultures- GNR. patient with ?chronic colonization of pseudomonas in setting of chronic vent. Start tobramycin nebs  GI: Glucerna feeds through peg. 250 cc free water q6 for hypernatremia. c/w fleet enema, famotidine, psyllium, sucralfate, and lactobillus  Renal: prerenal RYAN 2/2 sepsis with lactic acidosis improving with fluid administration. Will c/w IVF, lactate trending down 3.5 --> 2.8). Chronic vaughn for urinary retention  ID: urosepsis - on zosyn 3.375 q8, Ucx grew proteus, f/u urine sensitivities, f/u blood cultures NGTD, sputum culture  Endo: hypoglycemia likely 2/2 home basal insulin & NPO status. ISS for insulin dependent diabetes, HbA1C 6.6  heme: 40 mg SQ LVX DVT ppx   73 yo F PMH frontotemporal dementia, aphasic stroke, chronic trach & peg, bed bound, insulin dependent DM, covid in march, HTN, recent PNA admitted for severe sepsis 2/2 uti.    Neuro: no acute issues. c/w transdermal fentanyl patch  Cardio: hemodynamically stable. TTE shows EF 60-65%, Grade 1 LV diastolic dysfunction, MR, TR. ASA 81 daily through peg tube. Strict Is/Os  Pulm: continue with mechanical ventilation. CXR clear. 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  GI: Glucerna feeds through peg. 250 cc free water q6 for hypernatremia. c/w fleet enema, famotidine, psyllium, sucralfate, and lactobillus  Renal: prerenal RYAN 2/2 sepsis with lactic acidosis improving with fluid administration. Will c/w IVF, lactate trending down 3.5 --> 2.8). Chronic vaughn for urinary retention  ID: urosepsis - on zosyn 3.375 q8, Ucx grew proteus, f/u urine sensitivities, f/u blood cultures NGTD, sputum culture  Endo: hypoglycemia likely 2/2 home basal insulin & NPO status. ISS for insulin dependent diabetes, HbA1C 6.6  heme: 40 mg SQ LVX DVT ppx

## 2020-11-01 NOTE — PROGRESS NOTE ADULT - SUBJECTIVE AND OBJECTIVE BOX
[facility]       HPI:  73 yo Female sent from [facility] with respiratory distress. No report of fever or hypoxia. Patient trached/vented ,s/p peg  and non-verbal, unable to contribute to history. Transferred to [place] from [place] ED for admission Admitted for septic workup and evaluation ,send blood and urine cx,serial lactate levels,monitor vitals closely hydration,monitor urine output and renal profile, iv abx initiated -s/p vanco and zosyn . BP stable upon [place] ED arrival Med hx is significant for Advanced dementia ,s/p  Aphasic stroke    Constipation  ,COVID-19   ,CVA (cerebral vascular accident)  ,Dementia of frontal lobe type  ,Diabetes mellitus  ,DM (diabetes mellitus)  ,GERD (gastroesophageal reflux disease)    HTN (hypertension)  ,Hypertension  ,Pneumonia  ,Quadriplegia  ,Respiratory failure ,s/p tracheostomy and peg .Found to have RYAN ,hypernatremia and lactate elevated Admit for iv hydration,monitor renal profile and urine output,nutritionist consult,prealbumin level,serial bmp,nutritional supplements, Palliative care consult requested ,to discuss advance directives and complete MOLST  (30 Oct 2020 07:14)        SUBJECTIVE:      ALLERGIES:  Allergies    codeine (Hives)    Intolerances          MEDICATIONS  (STANDING):  ALBUTerol    90 MICROgram(s) HFA Inhaler 4 Puff(s) Inhalation every 6 hours  aspirin  chewable 81 milliGRAM(s) Oral daily  calcium carbonate 1250 mG  + Vitamin D (OsCal 500 + D) 1 Tablet(s) Oral two times a day  chlorhexidine 2% Cloths 1 Application(s) Topical daily  dextrose 5%. 1000 milliLiter(s) (50 mL/Hr) IV Continuous <Continuous>  enoxaparin Injectable 40 milliGRAM(s) SubCutaneous daily  famotidine    Tablet 20 milliGRAM(s) Oral daily  fentaNYL   Patch  12 MICROgram(s)/Hr 1 Patch Transdermal every 72 hours  insulin lispro (ADMELOG) corrective regimen sliding scale   SubCutaneous every 6 hours  ipratropium 17 MICROgram(s) HFA Inhaler 4 Puff(s) Inhalation every 6 hours  lactobacillus acidophilus 1 Tablet(s) Oral two times a day  piperacillin/tazobactam IVPB.. 3.375 Gram(s) IV Intermittent every 8 hours  psyllium Powder 1 Packet(s) Oral at bedtime  saline laxative (FLEET) Rectal Enema 1 Enema Rectal at bedtime  sucralfate 1 Gram(s) Oral two times a day  tobramycin for Nebulization 300 milliGRAM(s) Inhalation every 12 hours    MEDICATIONS  (PRN):  atropine 1% Ophthalmic Solution for SubLingual Use 1 Drop(s) SubLingual three times a day PRN salicary secretion  dextrose 40% Gel 15 Gram(s) Oral once PRN Blood Glucose LESS THAN 70 milliGRAM(s)/deciliter  glucagon  Injectable 1 milliGRAM(s) IntraMuscular once PRN Glucose LESS THAN 70 milligrams/deciliter  hydrALAZINE Injectable 10 milliGRAM(s) IV Push every 4 hours PRN SBP > 150      REVIEW OF SYSTEMS:  CONSTITUTIONAL: No fever,  RESPIRATORY: No cough, wheezing, shortness of breath  CARDIOVASCULAR: No chest pain, dyspnea, palpitations, dizziness, syncope, paroxysmal nocturnal dyspnea, orthopnea, or arm or leg swelling  GASTROINTESTINAL: No abdominal  or epigastric pain, nausea, vomiting,  diarrhea  NEUROLOGICAL: No headaches,  loss of strength, numbness, or tremors    Vital Signs Last 24 Hrs  T(C): 36.9 (01 Nov 2020 20:35), Max: 37.1 (01 Nov 2020 15:46)  T(F): 98.5 (01 Nov 2020 20:35), Max: 98.7 (01 Nov 2020 15:46)  HR: 76 (01 Nov 2020 20:30) (63 - 145)  BP: 134/63 (01 Nov 2020 20:00) (80/40 - 214/79)  BP(mean): 91 (01 Nov 2020 20:00) (57 - 157)  RR: 21 (01 Nov 2020 20:00) (14 - 37)  SpO2: 95% (01 Nov 2020 20:30) (89% - 100%)    PHYSICAL EXAM:  HEAD:  Atraumatic, Normocephalic  NECK: Supple and normal thyroid.  No JVD or carotid bruit.   HEART: S1, S2 regular , 1/6 soft ejection systolic murmur in mitral area , no thrill and no gallops .  PULMONARY: Bilateral vesicular breathing , few scattered ronchi and few scattered rales are present .  ABDOMEN: Soft nontender and positive bowl sounds   EXTREMITIES:  No clubbing, cyanosis, or pedal  edema  NEUROLOGICAL: AAOX3 , no focal deficit .    LABS:                        11.3   11.74 )-----------( 197      ( 01 Nov 2020 05:46 )             36.8     11-01    147<H>  |  110<H>  |  20  ----------------------------<  118<H>  3.7   |  30  |  1.00    Ca    9.1      01 Nov 2020 05:46  Phos  3.5     11-01  Mg     2.3     11-01    TPro  7.1  /  Alb  2.8<L>  /  TBili  0.6  /  DBili  x   /  AST  39<H>  /  ALT  34  /  AlkPhos  74  11-01            BNP      EKG:  ECHO:  IMAGING:    Assessment/Plan    Will continue to follow during hospital course and give further recommendations as needed . Thanks for your referral . if any questions please contact me at office ([phone] [phone]) JOSIE SON MD [facility]  [address] [address]  [address]  OFFICE : [phone]  CELL : [phone]    CARDIOLOGY F/U :       HPI:  73 yo Female sent from [facility] with respiratory distress. No report of fever or hypoxia. Patient trached/vented ,s/p peg  and non-verbal, unable to contribute to history. Transferred to [place] from [place] ED for admission Admitted for septic workup and evaluation ,send blood and urine cx,serial lactate levels,monitor vitals closely hydration,monitor urine output and renal profile, iv abx initiated -s/p vanco and zosyn . BP stable upon [place] ED arrival Med hx is significant for Advanced dementia ,s/p  Aphasic stroke    Constipation  ,COVID-19   ,CVA (cerebral vascular accident)  ,Dementia of frontal lobe type  ,Diabetes mellitus  ,DM (diabetes mellitus)  ,GERD (gastroesophageal reflux disease)    HTN (hypertension)  ,Hypertension  ,Pneumonia  ,Quadriplegia  ,Respiratory failure ,s/p tracheostomy and peg .Found to have RYAN ,hypernatremia and lactate elevated Admit for iv hydration,monitor renal profile and urine output,nutritionist consult,prealbumin level,serial bmp,nutritional supplements, Palliative care consult requested ,to discuss advance directives and complete MOLST  (30 Oct 2020 07:14)        SUBJECTIVE: Patient intubated and lethargic . No distress .      ALLERGIES:  Allergies    codeine (Hives)    Intolerances          MEDICATIONS  (STANDING):  ALBUTerol    90 MICROgram(s) HFA Inhaler 4 Puff(s) Inhalation every 6 hours  aspirin  chewable 81 milliGRAM(s) Oral daily  calcium carbonate 1250 mG  + Vitamin D (OsCal 500 + D) 1 Tablet(s) Oral two times a day  chlorhexidine 2% Cloths 1 Application(s) Topical daily  dextrose 5%. 1000 milliLiter(s) (50 mL/Hr) IV Continuous <Continuous>  enoxaparin Injectable 40 milliGRAM(s) SubCutaneous daily  famotidine    Tablet 20 milliGRAM(s) Oral daily  fentaNYL   Patch  12 MICROgram(s)/Hr 1 Patch Transdermal every 72 hours  insulin lispro (ADMELOG) corrective regimen sliding scale   SubCutaneous every 6 hours  ipratropium 17 MICROgram(s) HFA Inhaler 4 Puff(s) Inhalation every 6 hours  lactobacillus acidophilus 1 Tablet(s) Oral two times a day  piperacillin/tazobactam IVPB.. 3.375 Gram(s) IV Intermittent every 8 hours  psyllium Powder 1 Packet(s) Oral at bedtime  saline laxative (FLEET) Rectal Enema 1 Enema Rectal at bedtime  sucralfate 1 Gram(s) Oral two times a day  tobramycin for Nebulization 300 milliGRAM(s) Inhalation every 12 hours    MEDICATIONS  (PRN):  atropine 1% Ophthalmic Solution for SubLingual Use 1 Drop(s) SubLingual three times a day PRN salicary secretion  dextrose 40% Gel 15 Gram(s) Oral once PRN Blood Glucose LESS THAN 70 milliGRAM(s)/deciliter  glucagon  Injectable 1 milliGRAM(s) IntraMuscular once PRN Glucose LESS THAN 70 milligrams/deciliter  hydrALAZINE Injectable 10 milliGRAM(s) IV Push every 4 hours PRN SBP > 150      REVIEW OF SYSTEMS:  CONSTITUTIONAL: No fever,  RESPIRATORY: No cough, wheezing, shortness of breath and patient vent dependant .  CARDIOVASCULAR: No chest pain, palpitations, dizziness, syncope, paroxysmal nocturnal dyspnea , or arm or leg swelling and patient vent dependant .  GASTROINTESTINAL: No abdominal  or epigastric pain, nausea, vomiting,  diarrhea  NEUROLOGICAL: No headaches,  numbness, or tremors  Skin : No itching .  Hematology : No bleeding .  Endocrinology : No heat and cold intolerance .  Psychiatric : Patient is calm .  Musculoskeletal : Patient has chronic uarthritis .    Vital Signs Last 24 Hrs  T(C): 36.9 (01 Nov 2020 20:35), Max: 37.1 (01 Nov 2020 15:46)  T(F): 98.5 (01 Nov 2020 20:35), Max: 98.7 (01 Nov 2020 15:46)  HR: 76 (01 Nov 2020 20:30) (63 - 145)  BP: 134/63 (01 Nov 2020 20:00) (80/40 - 214/79)  BP(mean): 91 (01 Nov 2020 20:00) (57 - 157)  RR: 21 (01 Nov 2020 20:00) (14 - 37)  SpO2: 95% (01 Nov 2020 20:30) (89% - 100%)    PHYSICAL EXAM:  HEAD:  Atraumatic, Normocephalic  NECK: Supple and normal thyroid.  No JVD or carotid bruit.   HEART: S1, S2 ieregular , 1/6 soft ejection systolic murmur in mitral area , no thrill and no gallops .  PULMONARY: Bilateral vesicular breathing , few scattered rhonchi and few scattered rales are present .  ABDOMEN: Soft nontender and positive bowl sounds   EXTREMITIES:  No clubbing, cyanosis, or pedal  edema  NEUROLOGICAL: Awake and confused .    LABS:                        11.3   11.74 )-----------( 197      ( 01 Nov 2020 05:46 )             36.8     11-01    147<H>  |  110<H>  |  20  ----------------------------<  118<H>  3.7   |  30  |  1.00    Ca    9.1      01 Nov 2020 05:46  Phos  3.5     11-01  Mg     2.3     11-01    TPro  7.1  /  Alb  2.8<L>  /  TBili  0.6  /  DBili  x   /  AST  39<H>  /  ALT  34  /  AlkPhos  74  11-01            Assessment/Plan  Patient has :  1) Respiratory failure and vent dependant .  2) Pneumonia   3) Urosepsis .  4) S/P hypotension and BP stable   5) H/O DM .  6) H/O GERD .  7) H/O CVA , quadriplegia .  8) Anemia   Plan : 1) Continue I/V hydration 2) I/V antibiotics . 3) Monitor hemoglobin and electrolytes .  Will continue to follow during hospital course and give further recommendations as needed . Thanks for your referral . if any questions please contact me at office ([phone] [phone])

## 2020-11-01 NOTE — PROGRESS NOTE ADULT - SUBJECTIVE AND OBJECTIVE BOX
Date/Time Patient Seen:  		  Referring MD:   Data Reviewed	       Patient is a 74y old  Female who presents with a chief complaint of lrthaegy (31 Oct 2020 21:15)      Subjective/HPI     PAST MEDICAL & SURGICAL HISTORY:  Pneumonia    Quadriplegia    COVID-19 virus detected    Advanced dementia    DM (diabetes mellitus)    HTN (hypertension)    CVA (cerebral vascular accident)    Respiratory failure    Constipation    GERD (gastroesophageal reflux disease)    Hypertension    Respiratory failure    Diabetes mellitus    Aphasic stroke    Dementia of frontal lobe type    Feeding by G-tube    Tracheostomy tube present    Tracheostomy in place    Gastrostomy in place    Hx of appendectomy          Medication list         MEDICATIONS  (STANDING):  ALBUTerol    90 MICROgram(s) HFA Inhaler 4 Puff(s) Inhalation every 6 hours  aspirin  chewable 81 milliGRAM(s) Oral daily  calcium carbonate 1250 mG  + Vitamin D (OsCal 500 + D) 1 Tablet(s) Oral two times a day  chlorhexidine 2% Cloths 1 Application(s) Topical daily  dextrose 5%. 1000 milliLiter(s) (50 mL/Hr) IV Continuous <Continuous>  enoxaparin Injectable 40 milliGRAM(s) SubCutaneous daily  famotidine    Tablet 20 milliGRAM(s) Oral daily  fentaNYL   Patch  12 MICROgram(s)/Hr 1 Patch Transdermal every 72 hours  insulin lispro (ADMELOG) corrective regimen sliding scale   SubCutaneous every 6 hours  ipratropium 17 MICROgram(s) HFA Inhaler 4 Puff(s) Inhalation every 6 hours  lactated ringers. 1000 milliLiter(s) (75 mL/Hr) IV Continuous <Continuous>  lactobacillus acidophilus 1 Tablet(s) Oral two times a day  piperacillin/tazobactam IVPB.. 3.375 Gram(s) IV Intermittent every 8 hours  psyllium Powder 1 Packet(s) Oral at bedtime  saline laxative (FLEET) Rectal Enema 1 Enema Rectal at bedtime  sucralfate 1 Gram(s) Oral two times a day  tobramycin for Nebulization 300 milliGRAM(s) Inhalation every 12 hours    MEDICATIONS  (PRN):  atropine 1% Ophthalmic Solution for SubLingual Use 1 Drop(s) SubLingual three times a day PRN salicary secretion  dextrose 40% Gel 15 Gram(s) Oral once PRN Blood Glucose LESS THAN 70 milliGRAM(s)/deciliter  glucagon  Injectable 1 milliGRAM(s) IntraMuscular once PRN Glucose LESS THAN 70 milligrams/deciliter  hydrALAZINE Injectable 10 milliGRAM(s) IV Push every 4 hours PRN SBP > 150         Vitals log        ICU Vital Signs Last 24 Hrs  T(C): 36.5 (01 Nov 2020 04:10), Max: 37 (31 Oct 2020 20:30)  T(F): 97.7 (01 Nov 2020 04:10), Max: 98.6 (31 Oct 2020 20:30)  HR: 64 (01 Nov 2020 06:03) (55 - 145)  BP: 121/80 (01 Nov 2020 06:00) (80/40 - 214/79)  BP(mean): 96 (01 Nov 2020 06:00) (57 - 157)  ABP: --  ABP(mean): --  RR: 16 (01 Nov 2020 06:00) (14 - 37)  SpO2: 100% (01 Nov 2020 06:03) (91% - 100%)       Mode: VC+  RR (machine): 14  TV (machine): 400  FiO2: 30  PEEP: 5  ITime: 1  MAP: 14  PIP: 42      Input and Output:  I&O's Detail    30 Oct 2020 07:01  -  31 Oct 2020 07:00  --------------------------------------------------------  IN:    Glucerna: 660 mL    Lactated Ringers: 2475 mL  Total IN: 3135 mL    OUT:    Indwelling Catheter - Urethral (mL): 1010 mL  Total OUT: 1010 mL    Total NET: 2125 mL      31 Oct 2020 08:01  -  01 Nov 2020 06:48  --------------------------------------------------------  IN:    Enteral Tube Flush: 120 mL    Glucerna: 1000 mL    IV PiggyBack: 300 mL    Lactated Ringers: 1800 mL    Oral Fluid: 100 mL  Total IN: 3320 mL    OUT:    Indwelling Catheter - Urethral (mL): 2350 mL  Total OUT: 2350 mL    Total NET: 970 mL          Lab Data                        11.3   11.74 )-----------( 197      ( 01 Nov 2020 05:46 )             36.8     11-01    147<H>  |  110<H>  |  20  ----------------------------<  118<H>  3.7   |  30  |  1.00    Ca    9.1      01 Nov 2020 05:46  Phos  3.5     11-01  Mg     2.3     11-01    TPro  7.1  /  Alb  2.8<L>  /  TBili  0.6  /  DBili  x   /  AST  39<H>  /  ALT  34  /  AlkPhos  74  11-01    ABG - ( 31 Oct 2020 22:41 )  pH, Arterial: 7.40  pH, Blood: x     /  pCO2: 38    /  pO2: 80    / HCO3: 23    / Base Excess: -1.3  /  SaO2: 95                CARDIAC MARKERS ( 30 Oct 2020 14:23 )  .066 ng/mL / x     / x     / x     / x            Review of Systems	      Objective     Physical Examination    heart s1s2  lung dec BS  abd soft      Pertinent Lab findings & Imaging      Annalise:  NO   Adequate UO     I&O's Detail    30 Oct 2020 07:01  -  31 Oct 2020 07:00  --------------------------------------------------------  IN:    Glucerna: 660 mL    Lactated Ringers: 2475 mL  Total IN: 3135 mL    OUT:    Indwelling Catheter - Urethral (mL): 1010 mL  Total OUT: 1010 mL    Total NET: 2125 mL      31 Oct 2020 08:01  -  01 Nov 2020 06:48  --------------------------------------------------------  IN:    Enteral Tube Flush: 120 mL    Glucerna: 1000 mL    IV PiggyBack: 300 mL    Lactated Ringers: 1800 mL    Oral Fluid: 100 mL  Total IN: 3320 mL    OUT:    Indwelling Catheter - Urethral (mL): 2350 mL  Total OUT: 2350 mL    Total NET: 970 mL               Discussed with:     Cultures:	        Radiology

## 2020-11-01 NOTE — PROGRESS NOTE ADULT - SUBJECTIVE AND OBJECTIVE BOX
Patient is a 74y Female whom presented to the hospital with ckd and manasa     PAST MEDICAL & SURGICAL HISTORY:  Pneumonia    Quadriplegia    COVID-19 virus detected    Advanced dementia    DM (diabetes mellitus)    HTN (hypertension)    CVA (cerebral vascular accident)    Respiratory failure    Constipation    GERD (gastroesophageal reflux disease)    Hypertension    Respiratory failure    Diabetes mellitus    Aphasic stroke    Dementia of frontal lobe type    Feeding by G-tube    Tracheostomy tube present    Tracheostomy in place    Gastrostomy in place    Hx of appendectomy        MEDICATIONS  (STANDING):  ALBUTerol    90 MICROgram(s) HFA Inhaler 4 Puff(s) Inhalation every 6 hours  aspirin 325 milliGRAM(s) Enteral Tube daily  calcium carbonate 1250 mG  + Vitamin D (OsCal 500 + D) 1 Tablet(s) Oral two times a day  chlorhexidine 2% Cloths 1 Application(s) Topical daily  dextrose 5%. 1000 milliLiter(s) (50 mL/Hr) IV Continuous <Continuous>  famotidine    Tablet 20 milliGRAM(s) Oral daily  fentaNYL   Patch  12 MICROgram(s)/Hr 1 Patch Transdermal every 72 hours  insulin lispro (ADMELOG) corrective regimen sliding scale   SubCutaneous every 6 hours  ipratropium 17 MICROgram(s) HFA Inhaler 4 Puff(s) Inhalation every 6 hours  lactated ringers. 1000 milliLiter(s) (150 mL/Hr) IV Continuous <Continuous>  lactobacillus acidophilus 1 Tablet(s) Oral two times a day  piperacillin/tazobactam IVPB.. 3.375 Gram(s) IV Intermittent every 8 hours  psyllium Powder 1 Packet(s) Oral at bedtime  sucralfate 1 Gram(s) Oral two times a day  tobramycin for Nebulization 300 milliGRAM(s) Inhalation every 12 hours      Allergies    codeine (Hives)    Intolerances        SOCIAL HISTORY:  Denies ETOh,Smoking,     FAMILY HISTORY:  No pertinent family history in first degree relatives        REVIEW OF SYSTEMS:    unable to obtained a good review system                                              11.3   11.74 )-----------( 197      ( 01 Nov 2020 05:46 )             36.8       CBC Full  -  ( 01 Nov 2020 05:46 )  WBC Count : 11.74 K/uL  RBC Count : 4.18 M/uL  Hemoglobin : 11.3 g/dL  Hematocrit : 36.8 %  Platelet Count - Automated : 197 K/uL  Mean Cell Volume : 88.0 fl  Mean Cell Hemoglobin : 27.0 pg  Mean Cell Hemoglobin Concentration : 30.7 gm/dL  Auto Neutrophil # : 8.21 K/uL  Auto Lymphocyte # : 2.20 K/uL  Auto Monocyte # : 0.99 K/uL  Auto Eosinophil # : 0.08 K/uL  Auto Basophil # : 0.05 K/uL  Auto Neutrophil % : 70.0 %  Auto Lymphocyte % : 18.7 %  Auto Monocyte % : 8.4 %  Auto Eosinophil % : 0.7 %  Auto Basophil % : 0.4 %      11-01    147<H>  |  110<H>  |  20  ----------------------------<  118<H>  3.7   |  30  |  1.00    Ca    9.1      01 Nov 2020 05:46  Phos  3.5     11-01  Mg     2.3     11-01    TPro  7.1  /  Alb  2.8<L>  /  TBili  0.6  /  DBili  x   /  AST  39<H>  /  ALT  34  /  AlkPhos  74  11-01      CAPILLARY BLOOD GLUCOSE      POCT Blood Glucose.: 149 mg/dL (01 Nov 2020 17:43)  POCT Blood Glucose.: 169 mg/dL (01 Nov 2020 12:03)  POCT Blood Glucose.: 102 mg/dL (01 Nov 2020 06:07)  POCT Blood Glucose.: 190 mg/dL (01 Nov 2020 00:15)      Vital Signs Last 24 Hrs  T(C): 37.1 (01 Nov 2020 15:46), Max: 37.1 (01 Nov 2020 15:46)  T(F): 98.7 (01 Nov 2020 15:46), Max: 98.7 (01 Nov 2020 15:46)  HR: 81 (01 Nov 2020 19:00) (63 - 145)  BP: 134/64 (01 Nov 2020 19:00) (80/40 - 214/79)  BP(mean): 92 (01 Nov 2020 19:00) (57 - 157)  RR: 21 (01 Nov 2020 19:00) (14 - 37)  SpO2: 91% (01 Nov 2020 19:00) (89% - 100%)          PHYSICAL EXAM:    Constitutional: trached / vented ,  s/p peg  and non-verbal  Respiratory: decrease bs b/l   Cardiovascular: S1 and S2  Gastrointestinal: BS+, soft, s/p  peg  Extremities: trace  peripheral edema

## 2020-11-01 NOTE — PROGRESS NOTE ADULT - SUBJECTIVE AND OBJECTIVE BOX
[facility] DIVISION of INFECTIOUS DISEASE  Arturo Olivas MD PhD, Haylee Umanzor MD, Andressa Brantley MD, Billy Valenzuela MD  and providing coverage with Alexa Canas MD and Eusebio Chairez MD  Providing Infectious Disease Consultations at [facility], [facility]'s    Office# [phone] to schedule follow up appointments  Answering Service for urgent calls or New Consults [phone]  Cell# to text for urgent issues Arturo Olivas [phone]     infectious diseases progress note:    BREA BECKHAM is a 74y y. o. Female patient    No concerning overnight events    Allergies    codeine (Hives)    Intolerances        ANTIBIOTICS/RELEVANT:  antimicrobials  piperacillin/tazobactam IVPB.. 3.375 Gram(s) IV Intermittent every 8 hours  tobramycin for Nebulization 300 milliGRAM(s) Inhalation every 12 hours    immunologic:    OTHER:  ALBUTerol    90 MICROgram(s) HFA Inhaler 4 Puff(s) Inhalation every 6 hours  aspirin  chewable 81 milliGRAM(s) Oral daily  atropine 1% Ophthalmic Solution for SubLingual Use 1 Drop(s) SubLingual three times a day PRN  calcium carbonate 1250 mG  + Vitamin D (OsCal 500 + D) 1 Tablet(s) Oral two times a day  chlorhexidine 2% Cloths 1 Application(s) Topical daily  dextrose 40% Gel 15 Gram(s) Oral once PRN  dextrose 5%. 1000 milliLiter(s) IV Continuous <Continuous>  enoxaparin Injectable 40 milliGRAM(s) SubCutaneous daily  famotidine    Tablet 20 milliGRAM(s) Oral daily  fentaNYL   Patch  12 MICROgram(s)/Hr 1 Patch Transdermal every 72 hours  glucagon  Injectable 1 milliGRAM(s) IntraMuscular once PRN  hydrALAZINE Injectable 10 milliGRAM(s) IV Push every 4 hours PRN  insulin lispro (ADMELOG) corrective regimen sliding scale   SubCutaneous every 6 hours  ipratropium 17 MICROgram(s) HFA Inhaler 4 Puff(s) Inhalation every 6 hours  lactobacillus acidophilus 1 Tablet(s) Oral two times a day  potassium chloride   Powder 40 milliEquivalent(s) Enteral Tube once  psyllium Powder 1 Packet(s) Oral at bedtime  saline laxative (FLEET) Rectal Enema 1 Enema Rectal at bedtime  sucralfate 1 Gram(s) Oral two times a day      Objective:  Vital Signs Last 24 Hrs  T(C): 36.5 (01 Nov 2020 07:30), Max: 37 (31 Oct 2020 20:30)  T(F): 97.7 (01 Nov 2020 07:30), Max: 98.6 (31 Oct 2020 20:30)  HR: 77 (01 Nov 2020 09:00) (58 - 145)  BP: 154/96 (01 Nov 2020 09:00) (80/40 - 214/79)  BP(mean): 120 (01 Nov 2020 09:00) (57 - 157)  RR: 20 (01 Nov 2020 09:00) (14 - 37)  SpO2: 100% (01 Nov 2020 09:00) (91% - 100%)    T(C): 36.5 (11-01-20 @ 07:30), Max: 37.1 (10-30-20 @ 15:50)  T(C): 36.5 (11-01-20 @ 07:30), Max: 38.6 (10-29-20 @ 22:14)  T(C): 36.5 (11-01-20 @ 07:30), Max: 38.6 (10-29-20 @ 22:14)    PHYSICAL EXAM:  HEENT: NC atraumatic  Neck: supple  Respiratory: no accessory muscle use, breathing comfortably  Cardiovascular: distant  Gastrointestinal: normal appearing, nondistended  Extremities: no clubbing, no cyanosis,      LABS:                          11.3   11.74 )-----------( 197      ( 01 Nov 2020 05:46 )             36.8       11.74 11-01 @ 05:46  6.31 10-31 @ 05:07  10.22 10-30 @ 06:48  20.99 10-29 @ 22:39      11-01    147<H>  |  110<H>  |  20  ----------------------------<  118<H>  3.7   |  30  |  1.00    Ca    9.1      01 Nov 2020 05:46  Phos  3.5     11-01  Mg     2.3     11-01    TPro  7.1  /  Alb  2.8<L>  /  TBili  0.6  /  DBili  x   /  AST  39<H>  /  ALT  34  /  AlkPhos  74  11-01      Creatinine, Serum: 1.00 mg/dL (11-01-20 @ 05:46)  Creatinine, Serum: 1.00 mg/dL (10-31-20 @ 05:07)  Creatinine, Serum: 1.40 mg/dL (10-30-20 @ 06:48)  Creatinine, Serum: 2.29 mg/dL (10-29-20 @ 22:39)                INFLAMMATORY MARKERS  Auto Neutrophil #: 8.21 K/uL (11-01-20 @ 05:46)  Auto Lymphocyte #: 2.20 K/uL (11-01-20 @ 05:46)  Auto Neutrophil #: 3.62 K/uL (10-31-20 @ 05:07)  Auto Lymphocyte #: 1.88 K/uL (10-31-20 @ 05:07)  Auto Neutrophil #: 16.96 K/uL (10-29-20 @ 22:39)  Auto Lymphocyte #: 2.29 K/uL (10-29-20 @ 22:39)    Lactate, Blood: 2.8 mmol/L (10-31-20 @ 05:07)  Lactate, Blood: 3.5 mmol/L (10-30-20 @ 14:23)  Lactate, Blood: 3.8 mmol/L (10-30-20 @ 06:49)  Lactate, Blood: 7.2 mmol/L (10-30-20 @ 00:22)  Lactate, Blood: 8.7 mmol/L (10-29-20 @ 22:39)    Auto Eosinophil #: 0.08 K/uL (11-01-20 @ 05:46)  Auto Eosinophil #: 0.17 K/uL (10-31-20 @ 05:07)  Auto Eosinophil #: 0.01 K/uL (10-29-20 @ 22:39)          Troponin I, Serum: .066 ng/mL (10-30-20 @ 14:23)  Troponin I, Serum: .086 ng/mL (10-30-20 @ 06:48)                Activated Partial Thromboplastin Time: 43.7 sec (10-29-20 @ 22:39)  INR: 1.08 ratio (10-29-20 @ 22:39)          MICROBIOLOGY:    Culture - Sputum . (10.31.20 @ 01:25)    Gram Stain:   Few Squamous epithelial cells per low power field  Few polymorphonuclear leukocytes per low power field  Rare Yeast like cells per oil power field  Rare Gram Negative Rods per oil power field    Specimen Source: .Sputum Sputum    Culture Results:   Few Mixed gram negative rods    Culture - Urine (10.30.20 @ 13:18)    Specimen Source: .Urine Clean Catch (Midstream)    Culture Results:   >100,000 CFU/ml Proteus mirabilis          RADIOLOGY & ADDITIONAL STUDIES:

## 2020-11-01 NOTE — PROGRESS NOTE ADULT - SUBJECTIVE AND OBJECTIVE BOX
Patient is a 74y old  Female who presents with a chief complaint of lrthaegy (31 Oct 2020 21:15)    24 hour events: ***    REVIEW OF SYSTEMS: unable to obtain due to mental status    T(F): 97.7 (11-01-20 @ 04:10), Max: 98.6 (10-31-20 @ 20:30)  HR: 74 (11-01-20 @ 07:00) (55 - 145)  BP: 153/67 (11-01-20 @ 07:00) (80/40 - 214/79)  RR: 17 (11-01-20 @ 07:00) (14 - 37)  SpO2: 100% (11-01-20 @ 07:00) (91% - 100%)    Mode: VC+, RR (machine): 14, TV (machine): 400, FiO2: 30, PEEP: 5        I&O's Summary    10-31 @ 08:01  -  11-01 @ 07:00  --------------------------------------------------------  IN: 3320 mL / OUT: 2350 mL / NET: 970 mL      PHYSICAL EXAM  General: frail appearing woman, awake, intubated, trached  CNS: contracted, bedbound, does not interact/ follow commands, withdraws from painful stimuli  HEENT: PERRL  Resp: clear to auscultation bilaterally  CVS: rrr, no m/r/g  Abd: soft, suprapubic tenderness, peg site clean  Ext: no extremity edema, 2+ pulses in extremities  Skin: warm & perfused, no rashes    MEDICATIONS  piperacillin/tazobactam IVPB.. IV Intermittent  tobramycin for Nebulization Inhalation    hydrALAZINE Injectable IV Push PRN    dextrose 40% Gel Oral PRN  glucagon  Injectable IntraMuscular PRN  insulin lispro (ADMELOG) corrective regimen sliding scale SubCutaneous    ALBUTerol    90 MICROgram(s) HFA Inhaler Inhalation  ipratropium 17 MICROgram(s) HFA Inhaler Inhalation    fentaNYL   Patch  12 MICROgram(s)/Hr Transdermal      aspirin  chewable Oral  enoxaparin Injectable SubCutaneous    famotidine    Tablet Oral  psyllium Powder Oral  saline laxative (FLEET) Rectal Enema Rectal  sucralfate Oral      calcium carbonate 1250 mG  + Vitamin D (OsCal 500 + D) Oral  dextrose 5%. IV Continuous  lactated ringers. IV Continuous      atropine 1% Ophthalmic Solution for SubLingual Use SubLingual PRN  chlorhexidine 2% Cloths Topical    lactobacillus acidophilus Oral                          11.3   11.74 )-----------( 197      ( 01 Nov 2020 05:46 )             36.8       11-01    147<H>  |  110<H>  |  20  ----------------------------<  118<H>  3.7   |  30  |  1.00    Ca    9.1      01 Nov 2020 05:46  Phos  3.5     11-01  Mg     2.3     11-01    TPro  7.1  /  Alb  2.8<L>  /  TBili  0.6  /  DBili  x   /  AST  39<H>  /  ALT  34  /  AlkPhos  74  11-01      CARDIAC MARKERS ( 30 Oct 2020 14:23 )  .066 ng/mL / x     / x     / x     / x              .Sputum Sputum   Few Mixed gram negative rods   Few Squamous epithelial cells per low power field  Few polymorphonuclear leukocytes per low power field  Rare Yeast like cells per oil power field  Rare Gram Negative Rods per oil power field 10-31 @ 01:25  .Blood Blood-Peripheral   No growth to date. -- 10-30 @ 13:18        Radiology: no new imaging  Bedside lung ultrasound: ***  Bedside ECHO: ***    CENTRAL LINE: N  REID: Y                       DATE INSERTED: 10/30/2020   A-LINE: N      GLOBAL ISSUE/BEST PRACTICE  Analgesia: Y  Sedation: N  HOB elevation: yes  Stress ulcer prophylaxis: Y  VTE prophylaxis: LVX  Glycemic control: Y  Nutrition: Glucerna feeds    [facility] discussion: Y       Patient is a 74y old  Female who presents with a chief complaint of lrthaegy (31 Oct 2020 21:15)    24 hour events: No acute events overnight.     REVIEW OF SYSTEMS: unable to obtain due to mental status    T(F): 97.7 (11-01-20 @ 04:10), Max: 98.6 (10-31-20 @ 20:30)  HR: 74 (11-01-20 @ 07:00) (55 - 145)  BP: 153/67 (11-01-20 @ 07:00) (80/40 - 214/79)  RR: 17 (11-01-20 @ 07:00) (14 - 37)  SpO2: 100% (11-01-20 @ 07:00) (91% - 100%)    Mode: VC+, RR (machine): 14, TV (machine): 400, FiO2: 30, PEEP: 5        I&O's Summary    10-31 @ 08:01  -  11-01 @ 07:00  --------------------------------------------------------  IN: 3320 mL / OUT: 2350 mL / NET: 970 mL      PHYSICAL EXAM  General: frail appearing woman, awake, intubated, trached  CNS: contracted, bedbound, does not follow commands, withdraws from painful stimuli, open eyes to name  HEENT: PERRL  Resp: clear to auscultation bilaterally, trache clean without noticeable discharge  CVS: rrr, no m/r/g  Abd: soft, peg site clean  Ext: no extremity edema, 2+ pulses in extremities  Skin: warm & perfused, no rashes    MEDICATIONS  piperacillin/tazobactam IVPB.. IV Intermittent  tobramycin for Nebulization Inhalation    hydrALAZINE Injectable IV Push PRN    dextrose 40% Gel Oral PRN  glucagon  Injectable IntraMuscular PRN  insulin lispro (ADMELOG) corrective regimen sliding scale SubCutaneous    ALBUTerol    90 MICROgram(s) HFA Inhaler Inhalation  ipratropium 17 MICROgram(s) HFA Inhaler Inhalation    fentaNYL   Patch  12 MICROgram(s)/Hr Transdermal      aspirin  chewable Oral  enoxaparin Injectable SubCutaneous    famotidine    Tablet Oral  psyllium Powder Oral  saline laxative (FLEET) Rectal Enema Rectal  sucralfate Oral      calcium carbonate 1250 mG  + Vitamin D (OsCal 500 + D) Oral  dextrose 5%. IV Continuous  lactated ringers. IV Continuous      atropine 1% Ophthalmic Solution for SubLingual Use SubLingual PRN  chlorhexidine 2% Cloths Topical    lactobacillus acidophilus Oral                          11.3   11.74 )-----------( 197      ( 01 Nov 2020 05:46 )             36.8       11-01    147<H>  |  110<H>  |  20  ----------------------------<  118<H>  3.7   |  30  |  1.00    Ca    9.1      01 Nov 2020 05:46  Phos  3.5     11-01  Mg     2.3     11-01    TPro  7.1  /  Alb  2.8<L>  /  TBili  0.6  /  DBili  x   /  AST  39<H>  /  ALT  34  /  AlkPhos  74  11-01      CARDIAC MARKERS ( 30 Oct 2020 14:23 )  .066 ng/mL / x     / x     / x     / x              .Sputum Sputum   Few Mixed gram negative rods   Few Squamous epithelial cells per low power field  Few polymorphonuclear leukocytes per low power field  Rare Yeast like cells per oil power field  Rare Gram Negative Rods per oil power field 10-31 @ 01:25  .Blood Blood-Peripheral   No growth to date. -- 10-30 @ 13:18        Radiology: no new imaging  Bedside lung ultrasound: N/A  Bedside ECHO: N/A    CENTRAL LINE: N  REID: Y                       DATE INSERTED: 10/30/2020   A-LINE: N      GLOBAL ISSUE/BEST PRACTICE  Analgesia: Y  Sedation: N  HOB elevation: yes  Stress ulcer prophylaxis: Y  VTE prophylaxis: LVX  Glycemic control: Y  Nutrition: Glucerna feeds    [facility] discussion: Y

## 2020-11-01 NOTE — PROGRESS NOTE ADULT - ASSESSMENT
75 yo Female sent from [facility] with respiratory distress. No report of fever or hypoxia. Patient trached/vented ,s/p peg  and non-verbal, unable to contribute to history. Transferred to [place] from [place] ED for admission Admitted for septic workup and evaluation ,send blood and urine cx,serial lactate levels,monitor vitals closely hydration,monitor urine output and renal profile, iv abx initiated -s/p vanco and zosyn . BP stable upon [place] ED arrival Med hx is significant for Advanced dementia ,s/p  Aphasic stroke    Constipation  ,COVID-19   ,CVA (cerebral vascular accident)  ,Dementia of frontal lobe type  ,Diabetes mellitus  ,DM (diabetes mellitus)  ,GERD (gastroesophageal reflux disease)    HTN (hypertension)  ,Hypertension  ,Pneumonia  ,Quadriplegia  ,Respiratory failure s/p tracheostomy and peg .Found to have RYAN ,hypernatremia and lactate elevated Admit for iv hydration,monitor renal profile and urine output,nutritionist consult,prealbumin level,serial bmp,nutritional supplements Palliative care consult requested ,to discuss advance directives and complete MOLST

## 2020-11-01 NOTE — PROGRESS NOTE ADULT - ASSESSMENT
73 yo Female sent from [facility] with respiratory distress. No report of fever or hypoxia. Patient trached/vented ,s/p peg  and non-verbal, unable to contribute to history. Transferred to [place] from [place] ED for admission Admitted for septic workup and evaluation ,send blood and urine cx,serial lactate levels,monitor vitals closely hydration,monitor urine output and renal profile, iv abx initiated -s/p vanco and zosyn . BP stable upon [place] ED arrival Med hx is significant for Advanced dementia ,s/p  Aphasic stroke  , Constipation  ,COVID-19   ,CVA (cerebral vascular accident)  ,Dementia of frontal lobe type  ,Diabetes mellitus  ,DM (diabetes mellitus)  ,GERD (gastroesophageal reflux disease)    HTN (hypertension)  ,Hypertension  ,Pneumonia  ,Quadriplegia  ,Respiratory failure ,s/p tracheostomy and peg .Found to have RYAN ,hypernatremia .

## 2020-11-01 NOTE — PROGRESS NOTE ADULT - ASSESSMENT
73 yo Female w Med is significant for Advanced dementia ,s/p  Aphasic stroke  Constipation  ,COVID-19   ,CVA (cerebral vascular accident)  ,Dementia of frontal lobe type  ,Diabetes mellitus  ,DM (diabetes mellitus)  ,GERD (gastroesophageal reflux disease)  HTN (hypertension)  ,Hypertension  ,Pneumonia  ,Quadriplegia  ,Respiratory failure ,s/p tracheostomy and peg .Found to have RYAN ,hypernatremia and lactate elevated leukocytosis an concern for sepsis req intubation and ICU level care

## 2020-11-01 NOTE — PROGRESS NOTE ADULT - SUBJECTIVE AND OBJECTIVE BOX
BREA BECKHAM    [facility] ICU1 18    Patient is a 74y old  Female who presents with a chief complaint of lrthaegy (31 Oct 2020 21:15)       Allergies    codeine (Hives)    Intolerances        HPI:  75 yo Female sent from [facility] with respiratory distress. No report of fever or hypoxia. Patient trached/vented ,s/p peg  and non-verbal, unable to contribute to history. Transferred to [place] from [place] ED for admission Admitted for septic workup and evaluation ,send blood and urine cx,serial lactate levels,monitor vitals closely hydration,monitor urine output and renal profile, iv abx initiated -s/p vanco and zosyn . BP stable upon [place] ED arrival Med hx is significant for Advanced dementia ,s/p  Aphasic stroke    Constipation  ,COVID-19   ,CVA (cerebral vascular accident)  ,Dementia of frontal lobe type  ,Diabetes mellitus  ,DM (diabetes mellitus)  ,GERD (gastroesophageal reflux disease)    HTN (hypertension)  ,Hypertension  ,Pneumonia  ,Quadriplegia  ,Respiratory failure ,s/p tracheostomy and peg .Found to have RYAN ,hypernatremia and lactate elevated Admit for iv hydration,monitor renal profile and urine output,nutritionist consult,prealbumin level,serial bmp,nutritional supplements, Palliative care consult requested ,to discuss advance directives and complete MOLST  (30 Oct 2020 07:14)      PAST MEDICAL & SURGICAL HISTORY:  Pneumonia    Quadriplegia    COVID-19 virus detected    Advanced dementia    DM (diabetes mellitus)    HTN (hypertension)    CVA (cerebral vascular accident)    Respiratory failure    Constipation    GERD (gastroesophageal reflux disease)    Hypertension    Respiratory failure    Diabetes mellitus    Aphasic stroke    Dementia of frontal lobe type    Feeding by G-tube    Tracheostomy tube present    Tracheostomy in place    Gastrostomy in place    Hx of appendectomy        FAMILY HISTORY:  No pertinent family history in first degree relatives          MEDICATIONS   ALBUTerol    90 MICROgram(s) HFA Inhaler 4 Puff(s) Inhalation every 6 hours  aspirin  chewable 81 milliGRAM(s) Oral daily  atropine 1% Ophthalmic Solution for SubLingual Use 1 Drop(s) SubLingual three times a day PRN  calcium carbonate 1250 mG  + Vitamin D (OsCal 500 + D) 1 Tablet(s) Oral two times a day  chlorhexidine 2% Cloths 1 Application(s) Topical daily  dextrose 40% Gel 15 Gram(s) Oral once PRN  dextrose 5%. 1000 milliLiter(s) IV Continuous <Continuous>  enoxaparin Injectable 40 milliGRAM(s) SubCutaneous daily  famotidine    Tablet 20 milliGRAM(s) Oral daily  fentaNYL   Patch  12 MICROgram(s)/Hr 1 Patch Transdermal every 72 hours  glucagon  Injectable 1 milliGRAM(s) IntraMuscular once PRN  hydrALAZINE Injectable 10 milliGRAM(s) IV Push every 4 hours PRN  insulin lispro (ADMELOG) corrective regimen sliding scale   SubCutaneous every 6 hours  ipratropium 17 MICROgram(s) HFA Inhaler 4 Puff(s) Inhalation every 6 hours  lactobacillus acidophilus 1 Tablet(s) Oral two times a day  piperacillin/tazobactam IVPB.. 3.375 Gram(s) IV Intermittent every 8 hours  psyllium Powder 1 Packet(s) Oral at bedtime  saline laxative (FLEET) Rectal Enema 1 Enema Rectal at bedtime  sucralfate 1 Gram(s) Oral two times a day  tobramycin for Nebulization 300 milliGRAM(s) Inhalation every 12 hours      Vital Signs Last 24 Hrs  T(C): 36.5 (01 Nov 2020 07:30), Max: 37 (31 Oct 2020 20:30)  T(F): 97.7 (01 Nov 2020 07:30), Max: 98.6 (31 Oct 2020 20:30)  HR: 91 (01 Nov 2020 11:17) (64 - 145)  BP: 169/74 (01 Nov 2020 11:00) (80/40 - 214/79)  BP(mean): 106 (01 Nov 2020 11:00) (57 - 157)  RR: 25 (01 Nov 2020 11:00) (14 - 37)  SpO2: 100% (01 Nov 2020 11:17) (91% - 100%)      10-31-20 @ 08:01 - 11-01-20 @ 07:00  --------------------------------------------------------  IN: 3320 mL / OUT: 2350 mL / NET: 970 mL    11-01-20 @ 07:01  - 11-01-20 @ 13:41  --------------------------------------------------------  IN: 375 mL / OUT: 0 mL / NET: 375 mL        Mode: CPAP with PS, FiO2: 30, PEEP: 5, PS: 8, MAP: 8.5    LABS:                        11.3   11.74 )-----------( 197      ( 01 Nov 2020 05:46 )             36.8     11-01    147<H>  |  110<H>  |  20  ----------------------------<  118<H>  3.7   |  30  |  1.00    Ca    9.1      01 Nov 2020 05:46  Phos  3.5     11-01  Mg     2.3     11-01    TPro  7.1  /  Alb  2.8<L>  /  TBili  0.6  /  DBili  x   /  AST  39<H>  /  ALT  34  /  AlkPhos  74  11-01          ABG - ( 31 Oct 2020 22:41 )  pH, Arterial: 7.40  pH, Blood: x     /  pCO2: 38    /  pO2: 80    / HCO3: 23    / Base Excess: -1.3  /  SaO2: 95                  WBC:  WBC Count: 11.74 K/uL (11-01 @ 05:46)  WBC Count: 6.31 K/uL (10-31 @ 05:07)  WBC Count: 10.22 K/uL (10-30 @ 06:48)  WBC Count: 20.99 K/uL (10-29 @ 22:39)      MICROBIOLOGY:  RECENT CULTURES:  10-31 .Sputum Sputum XXXX   Few Squamous epithelial cells per low power field  Few polymorphonuclear leukocytes per low power field  Rare Yeast like cells per oil power field  Rare Gram Negative Rods per oil power field   Few Mixed gram negative rods    10-30 .Blood Blood-Peripheral XXXX XXXX   No growth to date.          CARDIAC MARKERS ( 30 Oct 2020 14:23 )  .066 ng/mL / x     / x     / x     / x                Sodium:  Sodium, Serum: 147 mmol/L (11-01 @ 05:46)  Sodium, Serum: 149 mmol/L (10-31 @ 05:07)  Sodium, Serum: 150 mmol/L (10-30 @ 06:48)  Sodium, Serum: 146 mmol/L (10-29 @ 22:39)      1.00 mg/dL 11-01 @ 05:46  1.00 mg/dL 10-31 @ 05:07  1.40 mg/dL 10-30 @ 06:48  2.29 mg/dL 10-29 @ 22:39      Hemoglobin:  Hemoglobin: 11.3 g/dL (11-01 @ 05:46)  Hemoglobin: 10.0 g/dL (10-31 @ 05:07)  Hemoglobin: 11.6 g/dL (10-30 @ 06:48)  Hemoglobin: 13.2 g/dL (10-29 @ 22:39)      Platelets: Platelet Count - Automated: 197 K/uL (11-01 @ 05:46)  Platelet Count - Automated: 156 K/uL (10-31 @ 05:07)  Platelet Count - Automated: 196 K/uL (10-30 @ 06:48)  Platelet Count - Automated: 332 K/uL (10-29 @ 22:39)      LIVER FUNCTIONS - ( 01 Nov 2020 05:46 )  Alb: 2.8 g/dL / Pro: 7.1 g/dL / ALK PHOS: 74 U/L / ALT: 34 U/L / AST: 39 U/L / GGT: x                 RADIOLOGY & ADDITIONAL STUDIES:

## 2020-11-01 NOTE — PROGRESS NOTE ADULT - SUBJECTIVE AND OBJECTIVE BOX
PROGRESS NOTE  Patient is a 74y old  Female who presents with a chief complaint of lrthaegy (31 Oct 2020 21:15)      OVERNIGHT      HPI:  75 yo Female sent from [facility] with respiratory distress. No report of fever or hypoxia. Patient trached/vented ,s/p peg  and non-verbal, unable to contribute to history. Transferred to [facility] from [place] ED for admission Admitted for septic workup and evaluation ,send blood and urine cx,serial lactate levels,monitor vitals closely hydration,monitor urine output and renal profile, iv abx initiated -s/p vanco and zosyn . BP stable upon [facility] ED arrival Med hx is significant for Advanced dementia ,s/p  Aphasic stroke    Constipation  ,COVID-19   ,CVA (cerebral vascular accident)  ,Dementia of frontal lobe type  ,Diabetes mellitus  ,DM (diabetes mellitus)  ,GERD (gastroesophageal reflux disease)    HTN (hypertension)  ,Hypertension  ,Pneumonia  ,Quadriplegia  ,Respiratory failure ,s/p tracheostomy and peg .Found to have RYAN ,hypernatremia and lactate elevated Admit for iv hydration,monitor renal profile and urine output,nutritionist consult,prealbumin level,serial bmp,nutritional supplements, Palliative care consult requested ,to discuss advance directives and complete MOLST  (30 Oct 2020 07:14)    PAST MEDICAL & SURGICAL HISTORY:  Pneumonia    Quadriplegia    COVID-19 virus detected    Advanced dementia    DM (diabetes mellitus)    HTN (hypertension)    CVA (cerebral vascular accident)    Respiratory failure    Constipation    GERD (gastroesophageal reflux disease)    Hypertension    Respiratory failure    Diabetes mellitus    Aphasic stroke    Dementia of frontal lobe type    Feeding by G-tube    Tracheostomy tube present    Tracheostomy in place    Gastrostomy in place    Hx of appendectomy        MEDICATIONS  (STANDING):  ALBUTerol    90 MICROgram(s) HFA Inhaler 4 Puff(s) Inhalation every 6 hours  aspirin  chewable 81 milliGRAM(s) Oral daily  calcium carbonate 1250 mG  + Vitamin D (OsCal 500 + D) 1 Tablet(s) Oral two times a day  chlorhexidine 2% Cloths 1 Application(s) Topical daily  dextrose 5%. 1000 milliLiter(s) (50 mL/Hr) IV Continuous <Continuous>  enoxaparin Injectable 40 milliGRAM(s) SubCutaneous daily  famotidine    Tablet 20 milliGRAM(s) Oral daily  fentaNYL   Patch  12 MICROgram(s)/Hr 1 Patch Transdermal every 72 hours  insulin lispro (ADMELOG) corrective regimen sliding scale   SubCutaneous every 6 hours  ipratropium 17 MICROgram(s) HFA Inhaler 4 Puff(s) Inhalation every 6 hours  lactated ringers. 1000 milliLiter(s) (75 mL/Hr) IV Continuous <Continuous>  lactobacillus acidophilus 1 Tablet(s) Oral two times a day  piperacillin/tazobactam IVPB.. 3.375 Gram(s) IV Intermittent every 8 hours  potassium chloride   Powder 40 milliEquivalent(s) Enteral Tube once  psyllium Powder 1 Packet(s) Oral at bedtime  saline laxative (FLEET) Rectal Enema 1 Enema Rectal at bedtime  sucralfate 1 Gram(s) Oral two times a day  tobramycin for Nebulization 300 milliGRAM(s) Inhalation every 12 hours    MEDICATIONS  (PRN):  atropine 1% Ophthalmic Solution for SubLingual Use 1 Drop(s) SubLingual three times a day PRN salicary secretion  dextrose 40% Gel 15 Gram(s) Oral once PRN Blood Glucose LESS THAN 70 milliGRAM(s)/deciliter  glucagon  Injectable 1 milliGRAM(s) IntraMuscular once PRN Glucose LESS THAN 70 milligrams/deciliter  hydrALAZINE Injectable 10 milliGRAM(s) IV Push every 4 hours PRN SBP > 150      OBJECTIVE    T(C): 36.5 (11-01-20 @ 04:10), Max: 37 (10-31-20 @ 20:30)  HR: 74 (11-01-20 @ 07:00) (55 - 145)  BP: 153/67 (11-01-20 @ 07:00) (80/40 - 214/79)  RR: 17 (11-01-20 @ 07:00) (14 - 37)  SpO2: 100% (11-01-20 @ 07:00) (91% - 100%)  Wt(kg): --  I&O's Summary    31 Oct 2020 08:01  -  01 Nov 2020 07:00  --------------------------------------------------------  IN: 3320 mL / OUT: 2350 mL / NET: 970 mL          REVIEW OF SYSTEMS:  CONSTITUTIONAL: No fever, weight loss, or fatigue  EYES: No eye pain, visual disturbances, or discharge  ENMT:   No sinus or throat pain  NECK: No pain or stiffness  RESPIRATORY: No cough, wheezing, chills or hemoptysis; No shortness of breath  CARDIOVASCULAR: No chest pain, palpitations, dizziness, or leg swelling  GASTROINTESTINAL: No abdominal pain. No nausea, vomiting; No diarrhea or constipation. No melena or hematochezia.  GENITOURINARY: No dysuria, frequency, hematuria, or incontinence  NEUROLOGICAL: No headaches, memory loss, loss of strength, numbness, or tremors  SKIN: No itching, burning, rashes, or lesions   MUSCULOSKELETAL: No joint pain or swelling; No muscle, back, or extremity pain    PHYSICAL EXAM:  Appearance: NAD. VS past 24 hrs -as above   HEENT:   Moist oral mucosa. Conjunctiva clear b/l.   Neck : supple  Respiratory: Lungs CTAB.  Gastrointestinal:  Soft, nontender. No rebound. No rigidity. BS present	  Cardiovascular: RRR ,S1S2 present  Neurologic: Non-focal. Moving all extremities.  Extremities: No edema. No erythema. No calf tenderness.  Skin: No rashes, No ecchymoses, No cyanosis.	  wounds ,skin lesions-See skin assesment flow sheet   LABS:                        11.3   11.74 )-----------( 197      ( 01 Nov 2020 05:46 )             36.8     11-01    147<H>  |  110<H>  |  20  ----------------------------<  118<H>  3.7   |  30  |  1.00    Ca    9.1      01 Nov 2020 05:46  Phos  3.5     11-01  Mg     2.3     11-01    TPro  7.1  /  Alb  2.8<L>  /  TBili  0.6  /  DBili  x   /  AST  39<H>  /  ALT  34  /  AlkPhos  74  11-01    CAPILLARY BLOOD GLUCOSE      POCT Blood Glucose.: 102 mg/dL (01 Nov 2020 06:07)  POCT Blood Glucose.: 190 mg/dL (01 Nov 2020 00:15)  POCT Blood Glucose.: 144 mg/dL (31 Oct 2020 18:22)  POCT Blood Glucose.: 139 mg/dL (31 Oct 2020 12:29)          Culture - Sputum (collected 31 Oct 2020 01:25)  Source: .Sputum Sputum  Gram Stain (31 Oct 2020 05:02):    Few Squamous epithelial cells per low power field    Few polymorphonuclear leukocytes per low power field    Rare Yeast like cells per oil power field    Rare Gram Negative Rods per oil power field  Preliminary Report (31 Oct 2020 20:52):    Few Mixed gram negative rods    Culture - Urine (collected 30 Oct 2020 13:18)  Source: .Urine Clean Catch (Midstream)  Preliminary Report (31 Oct 2020 21:25):    >100,000 CFU/ml Proteus mirabilis    Culture - Blood (collected 30 Oct 2020 13:18)  Source: .Blood Blood-Peripheral  Preliminary Report (31 Oct 2020 14:00):    No growth to date.    Culture - Blood (collected 30 Oct 2020 13:18)  Source: .Blood Blood-Peripheral  Preliminary Report (31 Oct 2020 14:00):    No growth to date.      RADIOLOGY & ADDITIONAL TESTS:   reviewed elctronically  ASSESSMENT/PLAN: 	     PROGRESS NOTE  Patient is a 74y old  Female who presents with a chief complaint of lrthaegy (31 Oct 2020 21:15)  Chart and available morning labs /imaging are reviewed electronically , urgent issues addressed . More information  is being added upon completion of rounds , when more information is collected and management discussed with consultants , medical staff and social service/case management on the floor   LATE ENTRY- patient was seen and examined earlier today . Plan of care was discussed with med staff and unit coordinator .   OVERNIGHT  24 hour events: No acute events overnight.     REVIEW OF SYSTEMS: unable to obtain due to mental status    HPI:  75 yo Female sent from [facility] with respiratory distress. No report of fever or hypoxia. Patient trached/vented ,s/p peg  and non-verbal, unable to contribute to history. Transferred to [facility] from [place] ED for admission Admitted for septic workup and evaluation ,send blood and urine cx,serial lactate levels,monitor vitals closely hydration,monitor urine output and renal profile, iv abx initiated -s/p vanco and zosyn . BP stable upon [facility] ED arrival Med hx is significant for Advanced dementia ,s/p  Aphasic stroke    Constipation  ,COVID-19   ,CVA (cerebral vascular accident)  ,Dementia of frontal lobe type  ,Diabetes mellitus  ,DM (diabetes mellitus)  ,GERD (gastroesophageal reflux disease)    HTN (hypertension)  ,Hypertension  ,Pneumonia  ,Quadriplegia  ,Respiratory failure ,s/p tracheostomy and peg .Found to have RYAN ,hypernatremia and lactate elevated Admit for iv hydration,monitor renal profile and urine output,nutritionist consult,prealbumin level,serial bmp,nutritional supplements, Palliative care consult requested ,to discuss advance directives and complete MOLST  (30 Oct 2020 07:14)    PAST MEDICAL & SURGICAL HISTORY:  Pneumonia    Quadriplegia    COVID-19 virus detected    Advanced dementia    DM (diabetes mellitus)    HTN (hypertension)    CVA (cerebral vascular accident)    Respiratory failure    Constipation    GERD (gastroesophageal reflux disease)    Hypertension    Respiratory failure    Diabetes mellitus    Aphasic stroke    Dementia of frontal lobe type    Feeding by G-tube    Tracheostomy tube present    Tracheostomy in place    Gastrostomy in place    Hx of appendectomy        MEDICATIONS  (STANDING):  ALBUTerol    90 MICROgram(s) HFA Inhaler 4 Puff(s) Inhalation every 6 hours  aspirin  chewable 81 milliGRAM(s) Oral daily  calcium carbonate 1250 mG  + Vitamin D (OsCal 500 + D) 1 Tablet(s) Oral two times a day  chlorhexidine 2% Cloths 1 Application(s) Topical daily  dextrose 5%. 1000 milliLiter(s) (50 mL/Hr) IV Continuous <Continuous>  enoxaparin Injectable 40 milliGRAM(s) SubCutaneous daily  famotidine    Tablet 20 milliGRAM(s) Oral daily  fentaNYL   Patch  12 MICROgram(s)/Hr 1 Patch Transdermal every 72 hours  insulin lispro (ADMELOG) corrective regimen sliding scale   SubCutaneous every 6 hours  ipratropium 17 MICROgram(s) HFA Inhaler 4 Puff(s) Inhalation every 6 hours  lactated ringers. 1000 milliLiter(s) (75 mL/Hr) IV Continuous <Continuous>  lactobacillus acidophilus 1 Tablet(s) Oral two times a day  piperacillin/tazobactam IVPB.. 3.375 Gram(s) IV Intermittent every 8 hours  potassium chloride   Powder 40 milliEquivalent(s) Enteral Tube once  psyllium Powder 1 Packet(s) Oral at bedtime  saline laxative (FLEET) Rectal Enema 1 Enema Rectal at bedtime  sucralfate 1 Gram(s) Oral two times a day  tobramycin for Nebulization 300 milliGRAM(s) Inhalation every 12 hours    MEDICATIONS  (PRN):  atropine 1% Ophthalmic Solution for SubLingual Use 1 Drop(s) SubLingual three times a day PRN salicary secretion  dextrose 40% Gel 15 Gram(s) Oral once PRN Blood Glucose LESS THAN 70 milliGRAM(s)/deciliter  glucagon  Injectable 1 milliGRAM(s) IntraMuscular once PRN Glucose LESS THAN 70 milligrams/deciliter  hydrALAZINE Injectable 10 milliGRAM(s) IV Push every 4 hours PRN SBP > 150      OBJECTIVE    T(C): 36.5 (11-01-20 @ 04:10), Max: 37 (10-31-20 @ 20:30)  HR: 74 (11-01-20 @ 07:00) (55 - 145)  BP: 153/67 (11-01-20 @ 07:00) (80/40 - 214/79)  RR: 17 (11-01-20 @ 07:00) (14 - 37)  SpO2: 100% (11-01-20 @ 07:00) (91% - 100%)  Wt(kg): --  I&O's Summary    31 Oct 2020 08:01  -  01 Nov 2020 07:00  --------------------------------------------------------  IN: 3320 mL / OUT: 2350 mL / NET: 970 mL      NOTE In accordance with  [facility] policy ICU final medical management decisions/MD orders are  determined/done only by ICU Intensivists     REVIEW OF SYSTEMS:  ROS is unobtainable due to dementia and confusion PATIENT IS CONFUSED AND IS UNABLE TO GIVE ANY/RELIABLE HISTORY OR REVIEW OF SYSTEMS PLEASE ALSO SEE UNDER HPI AND ASSESSMENT FOR OBTAINED REVIEW OF SYSTEMS     PHYSICAL EXAM:  Appearance: NAD. VS past 24 hrs -as above   HEENT:   Moist oral mucosa. Conjunctiva clear b/l.   Neck : supple trach  Respiratory: Lungs diminished bs at bases   Gastrointestinal:  Soft, nontender. No rebound. No rigidity. BS present	peg  Cardiovascular: RRR ,S1S2 present  Neurologic: Non-focal. Moving all extremities.Contractures of ue and le  Extremities: No edema. No erythema. No calf tenderness.  Skin: No rashes, No ecchymoses, No cyanosis.	  wounds ,skin lesions-See skin assesment flow sheet   LABS:                        11.3   11.74 )-----------( 197      ( 01 Nov 2020 05:46 )             36.8     11-01    147<H>  |  110<H>  |  20  ----------------------------<  118<H>  3.7   |  30  |  1.00    Ca    9.1      01 Nov 2020 05:46  Phos  3.5     11-01  Mg     2.3     11-01    TPro  7.1  /  Alb  2.8<L>  /  TBili  0.6  /  DBili  x   /  AST  39<H>  /  ALT  34  /  AlkPhos  74  11-01    CAPILLARY BLOOD GLUCOSE      POCT Blood Glucose.: 102 mg/dL (01 Nov 2020 06:07)  POCT Blood Glucose.: 190 mg/dL (01 Nov 2020 00:15)  POCT Blood Glucose.: 144 mg/dL (31 Oct 2020 18:22)  POCT Blood Glucose.: 139 mg/dL (31 Oct 2020 12:29)          Culture - Sputum (collected 31 Oct 2020 01:25)  Source: .Sputum Sputum  Gram Stain (31 Oct 2020 05:02):    Few Squamous epithelial cells per low power field    Few polymorphonuclear leukocytes per low power field    Rare Yeast like cells per oil power field    Rare Gram Negative Rods per oil power field  Preliminary Report (31 Oct 2020 20:52):    Few Mixed gram negative rods    Culture - Urine (collected 30 Oct 2020 13:18)  Source: .Urine Clean Catch (Midstream)  Preliminary Report (31 Oct 2020 21:25):    >100,000 CFU/ml Proteus mirabilis    Culture - Blood (collected 30 Oct 2020 13:18)  Source: .Blood Blood-Peripheral  Preliminary Report (31 Oct 2020 14:00):    No growth to date.    Culture - Blood (collected 30 Oct 2020 13:18)  Source: .Blood Blood-Peripheral  Preliminary Report (31 Oct 2020 14:00):    No growth to date.  RADIOLOGY & ADDITIONAL TESTS:   reviewed elctronically	  25minutes spent on this visit, 50% visit time spent in care co-ordination with other attendings and counselling patient  I have discussed care plan with patient / HCP, expressed understanding of problems treatment and their effect and side effects, alternatives in detail,I have asked if they have any questions and concerns and appropriately addressed them to best of my ability

## 2020-11-02 LAB
-  AMIKACIN: SIGNIFICANT CHANGE UP
-  AZTREONAM: SIGNIFICANT CHANGE UP
-  CEFEPIME: SIGNIFICANT CHANGE UP
-  CEFTAZIDIME: SIGNIFICANT CHANGE UP
-  CIPROFLOXACIN: SIGNIFICANT CHANGE UP
-  GENTAMICIN: SIGNIFICANT CHANGE UP
-  IMIPENEM: SIGNIFICANT CHANGE UP
-  LEVOFLOXACIN: SIGNIFICANT CHANGE UP
-  MEROPENEM: SIGNIFICANT CHANGE UP
-  PIPERACILLIN/TAZOBACTAM: SIGNIFICANT CHANGE UP
-  TOBRAMYCIN: SIGNIFICANT CHANGE UP
ANION GAP SERPL CALC-SCNC: 8 MMOL/L — SIGNIFICANT CHANGE UP (ref 5–17)
BASOPHILS # BLD AUTO: 0.05 K/UL — SIGNIFICANT CHANGE UP (ref 0–0.2)
BASOPHILS NFR BLD AUTO: 0.6 % — SIGNIFICANT CHANGE UP (ref 0–2)
BUN SERPL-MCNC: 17 MG/DL — SIGNIFICANT CHANGE UP (ref 7–23)
CALCIUM SERPL-MCNC: 9.1 MG/DL — SIGNIFICANT CHANGE UP (ref 8.5–10.1)
CHLORIDE SERPL-SCNC: 109 MMOL/L — HIGH (ref 96–108)
CO2 SERPL-SCNC: 26 MMOL/L — SIGNIFICANT CHANGE UP (ref 22–31)
CREAT SERPL-MCNC: 0.95 MG/DL — SIGNIFICANT CHANGE UP (ref 0.5–1.3)
CULTURE RESULTS: SIGNIFICANT CHANGE UP
EOSINOPHIL # BLD AUTO: 0.12 K/UL — SIGNIFICANT CHANGE UP (ref 0–0.5)
EOSINOPHIL NFR BLD AUTO: 1.5 % — SIGNIFICANT CHANGE UP (ref 0–6)
GLUCOSE SERPL-MCNC: 175 MG/DL — HIGH (ref 70–99)
HCT VFR BLD CALC: 34.2 % — LOW (ref 34.5–45)
HGB BLD-MCNC: 11.1 G/DL — LOW (ref 11.5–15.5)
IMM GRANULOCYTES NFR BLD AUTO: 1.8 % — HIGH (ref 0–1.5)
LYMPHOCYTES # BLD AUTO: 2.06 K/UL — SIGNIFICANT CHANGE UP (ref 1–3.3)
LYMPHOCYTES # BLD AUTO: 26.3 % — SIGNIFICANT CHANGE UP (ref 13–44)
MAGNESIUM SERPL-MCNC: 2.3 MG/DL — SIGNIFICANT CHANGE UP (ref 1.6–2.6)
MCHC RBC-ENTMCNC: 27.7 PG — SIGNIFICANT CHANGE UP (ref 27–34)
MCHC RBC-ENTMCNC: 32.5 GM/DL — SIGNIFICANT CHANGE UP (ref 32–36)
MCV RBC AUTO: 85.3 FL — SIGNIFICANT CHANGE UP (ref 80–100)
METHOD TYPE: SIGNIFICANT CHANGE UP
MONOCYTES # BLD AUTO: 0.62 K/UL — SIGNIFICANT CHANGE UP (ref 0–0.9)
MONOCYTES NFR BLD AUTO: 7.9 % — SIGNIFICANT CHANGE UP (ref 2–14)
NEUTROPHILS # BLD AUTO: 4.83 K/UL — SIGNIFICANT CHANGE UP (ref 1.8–7.4)
NEUTROPHILS NFR BLD AUTO: 61.9 % — SIGNIFICANT CHANGE UP (ref 43–77)
NRBC # BLD: 0 /100 WBCS — SIGNIFICANT CHANGE UP (ref 0–0)
ORGANISM # SPEC MICROSCOPIC CNT: SIGNIFICANT CHANGE UP
ORGANISM # SPEC MICROSCOPIC CNT: SIGNIFICANT CHANGE UP
PHOSPHATE SERPL-MCNC: 3.5 MG/DL — SIGNIFICANT CHANGE UP (ref 2.5–4.5)
PLATELET # BLD AUTO: 225 K/UL — SIGNIFICANT CHANGE UP (ref 150–400)
POTASSIUM SERPL-MCNC: 3.4 MMOL/L — LOW (ref 3.5–5.3)
POTASSIUM SERPL-SCNC: 3.4 MMOL/L — LOW (ref 3.5–5.3)
RBC # BLD: 4.01 M/UL — SIGNIFICANT CHANGE UP (ref 3.8–5.2)
RBC # FLD: 14.8 % — HIGH (ref 10.3–14.5)
SODIUM SERPL-SCNC: 143 MMOL/L — SIGNIFICANT CHANGE UP (ref 135–145)
SPECIMEN SOURCE: SIGNIFICANT CHANGE UP
WBC # BLD: 7.82 K/UL — SIGNIFICANT CHANGE UP (ref 3.8–10.5)
WBC # FLD AUTO: 7.82 K/UL — SIGNIFICANT CHANGE UP (ref 3.8–10.5)

## 2020-11-02 PROCEDURE — 99233 SBSQ HOSP IP/OBS HIGH 50: CPT | Mod: GC

## 2020-11-02 RX ORDER — POTASSIUM CHLORIDE 20 MEQ
40 PACKET (EA) ORAL ONCE
Refills: 0 | Status: DISCONTINUED | OUTPATIENT
Start: 2020-11-02 | End: 2020-11-02

## 2020-11-02 RX ORDER — POTASSIUM CHLORIDE 20 MEQ
20 PACKET (EA) ORAL DAILY
Refills: 0 | Status: DISCONTINUED | OUTPATIENT
Start: 2020-11-02 | End: 2020-11-02

## 2020-11-02 RX ORDER — POTASSIUM CHLORIDE 20 MEQ
20 PACKET (EA) ORAL
Refills: 0 | Status: COMPLETED | OUTPATIENT
Start: 2020-11-02 | End: 2020-11-02

## 2020-11-02 RX ORDER — CEFTRIAXONE 500 MG/1
1 INJECTION, POWDER, FOR SOLUTION INTRAMUSCULAR; INTRAVENOUS EVERY 24 HOURS
Refills: 0 | Status: DISCONTINUED | OUTPATIENT
Start: 2020-11-02 | End: 2020-11-02

## 2020-11-02 RX ORDER — CEFTRIAXONE 500 MG/1
1000 INJECTION, POWDER, FOR SOLUTION INTRAMUSCULAR; INTRAVENOUS EVERY 24 HOURS
Refills: 0 | Status: DISCONTINUED | OUTPATIENT
Start: 2020-11-02 | End: 2020-11-06

## 2020-11-02 RX ADMIN — FENTANYL CITRATE 1 PATCH: 50 INJECTION INTRAVENOUS at 11:13

## 2020-11-02 RX ADMIN — ALBUTEROL 4 PUFF(S): 90 AEROSOL, METERED ORAL at 08:07

## 2020-11-02 RX ADMIN — Medication 10 MILLIGRAM(S): at 01:39

## 2020-11-02 RX ADMIN — ALBUTEROL 4 PUFF(S): 90 AEROSOL, METERED ORAL at 13:37

## 2020-11-02 RX ADMIN — CHLORHEXIDINE GLUCONATE 1 APPLICATION(S): 213 SOLUTION TOPICAL at 11:14

## 2020-11-02 RX ADMIN — Medication 2: at 05:26

## 2020-11-02 RX ADMIN — Medication 20 MILLIEQUIVALENT(S): at 14:04

## 2020-11-02 RX ADMIN — Medication 81 MILLIGRAM(S): at 11:13

## 2020-11-02 RX ADMIN — Medication 1 TABLET(S): at 05:25

## 2020-11-02 RX ADMIN — FENTANYL CITRATE 1 PATCH: 50 INJECTION INTRAVENOUS at 19:25

## 2020-11-02 RX ADMIN — FAMOTIDINE 20 MILLIGRAM(S): 10 INJECTION INTRAVENOUS at 11:13

## 2020-11-02 RX ADMIN — Medication 4 PUFF(S): at 01:46

## 2020-11-02 RX ADMIN — Medication 4 PUFF(S): at 20:09

## 2020-11-02 RX ADMIN — ALBUTEROL 4 PUFF(S): 90 AEROSOL, METERED ORAL at 20:09

## 2020-11-02 RX ADMIN — Medication 20 MILLIEQUIVALENT(S): at 14:08

## 2020-11-02 RX ADMIN — Medication 20 MILLIEQUIVALENT(S): at 11:04

## 2020-11-02 RX ADMIN — PIPERACILLIN AND TAZOBACTAM 25 GRAM(S): 4; .5 INJECTION, POWDER, LYOPHILIZED, FOR SOLUTION INTRAVENOUS at 01:35

## 2020-11-02 RX ADMIN — Medication 2: at 19:16

## 2020-11-02 RX ADMIN — Medication 2: at 00:20

## 2020-11-02 RX ADMIN — Medication 1 GRAM(S): at 05:25

## 2020-11-02 RX ADMIN — ENOXAPARIN SODIUM 40 MILLIGRAM(S): 100 INJECTION SUBCUTANEOUS at 11:13

## 2020-11-02 RX ADMIN — Medication 4 PUFF(S): at 08:07

## 2020-11-02 RX ADMIN — ALBUTEROL 4 PUFF(S): 90 AEROSOL, METERED ORAL at 01:46

## 2020-11-02 RX ADMIN — Medication 2: at 11:22

## 2020-11-02 RX ADMIN — Medication 300 MILLIGRAM(S): at 08:08

## 2020-11-02 RX ADMIN — Medication 1 TABLET(S): at 05:28

## 2020-11-02 RX ADMIN — FENTANYL CITRATE 1 PATCH: 50 INJECTION INTRAVENOUS at 09:38

## 2020-11-02 RX ADMIN — Medication 4 PUFF(S): at 13:37

## 2020-11-02 RX ADMIN — Medication 2: at 23:16

## 2020-11-02 RX ADMIN — Medication 1 TABLET(S): at 19:13

## 2020-11-02 RX ADMIN — CEFTRIAXONE 100 MILLIGRAM(S): 500 INJECTION, POWDER, FOR SOLUTION INTRAMUSCULAR; INTRAVENOUS at 14:04

## 2020-11-02 RX ADMIN — Medication 1 ENEMA: at 22:49

## 2020-11-02 NOTE — PROGRESS NOTE ADULT - SUBJECTIVE AND OBJECTIVE BOX
BREA BECKHAM    [facility] ICU1 18    Patient is a 74y old  Female who presents with a chief complaint of SOB (01 Nov 2020 21:20)       Allergies    codeine (Hives)    Intolerances        HPI:  75 yo Female sent from [facility] with respiratory distress. No report of fever or hypoxia. Patient trached/vented ,s/p peg  and non-verbal, unable to contribute to history. Transferred to [place] from [place] ED for admission Admitted for septic workup and evaluation ,send blood and urine cx,serial lactate levels,monitor vitals closely hydration,monitor urine output and renal profile, iv abx initiated -s/p vanco and zosyn . BP stable upon [place] ED arrival Med hx is significant for Advanced dementia ,s/p  Aphasic stroke    Constipation  ,COVID-19   ,CVA (cerebral vascular accident)  ,Dementia of frontal lobe type  ,Diabetes mellitus  ,DM (diabetes mellitus)  ,GERD (gastroesophageal reflux disease)    HTN (hypertension)  ,Hypertension  ,Pneumonia  ,Quadriplegia  ,Respiratory failure ,s/p tracheostomy and peg .Found to have RYAN ,hypernatremia and lactate elevated Admit for iv hydration,monitor renal profile and urine output,nutritionist consult,prealbumin level,serial bmp,nutritional supplements, Palliative care consult requested ,to discuss advance directives and complete MOLST  (30 Oct 2020 07:14)      PAST MEDICAL & SURGICAL HISTORY:  Pneumonia    Quadriplegia    COVID-19 virus detected    Advanced dementia    DM (diabetes mellitus)    HTN (hypertension)    CVA (cerebral vascular accident)    Respiratory failure    Constipation    GERD (gastroesophageal reflux disease)    Hypertension    Respiratory failure    Diabetes mellitus    Aphasic stroke    Dementia of frontal lobe type    Feeding by G-tube    Tracheostomy tube present    Tracheostomy in place    Gastrostomy in place    Hx of appendectomy        FAMILY HISTORY:  No pertinent family history in first degree relatives          MEDICATIONS   ALBUTerol    90 MICROgram(s) HFA Inhaler 4 Puff(s) Inhalation every 6 hours  aspirin  chewable 81 milliGRAM(s) Oral daily  atropine 1% Ophthalmic Solution for SubLingual Use 1 Drop(s) SubLingual three times a day PRN  calcium carbonate 1250 mG  + Vitamin D (OsCal 500 + D) 1 Tablet(s) Oral two times a day  chlorhexidine 2% Cloths 1 Application(s) Topical daily  dextrose 40% Gel 15 Gram(s) Oral once PRN  dextrose 5%. 1000 milliLiter(s) IV Continuous <Continuous>  enoxaparin Injectable 40 milliGRAM(s) SubCutaneous daily  famotidine    Tablet 20 milliGRAM(s) Oral daily  fentaNYL   Patch  12 MICROgram(s)/Hr 1 Patch Transdermal every 72 hours  glucagon  Injectable 1 milliGRAM(s) IntraMuscular once PRN  hydrALAZINE Injectable 10 milliGRAM(s) IV Push every 4 hours PRN  insulin lispro (ADMELOG) corrective regimen sliding scale   SubCutaneous every 6 hours  ipratropium 17 MICROgram(s) HFA Inhaler 4 Puff(s) Inhalation every 6 hours  lactobacillus acidophilus 1 Tablet(s) Oral two times a day  potassium chloride   Powder 20 milliEquivalent(s) Oral every 2 hours  psyllium Powder 1 Packet(s) Oral at bedtime  saline laxative (FLEET) Rectal Enema 1 Enema Rectal at bedtime  sucralfate 1 Gram(s) Oral two times a day      Vital Signs Last 24 Hrs  T(C): 37 (02 Nov 2020 04:00), Max: 37.2 (01 Nov 2020 23:17)  T(F): 98.6 (02 Nov 2020 04:00), Max: 98.9 (01 Nov 2020 23:17)  HR: 92 (02 Nov 2020 08:18) (63 - 108)  BP: 128/61 (02 Nov 2020 07:00) (89/50 - 205/103)  BP(mean): 88 (02 Nov 2020 07:00) (68 - 142)  RR: 14 (02 Nov 2020 07:00) (14 - 32)  SpO2: 100% (02 Nov 2020 08:18) (89% - 100%)      11-01-20 @ 07:01  -  11-02-20 @ 07:00  --------------------------------------------------------  IN: 3015 mL / OUT: 1850 mL / NET: 1165 mL        Mode: AC/ CMV (Assist Control/ Continuous Mandatory Ventilation), RR (machine): 14, TV (machine): 400, FiO2: 30, PEEP: 5, ITime: 1, MAP: 11, PIP: 21    LABS:                        11.1   7.82  )-----------( 225      ( 02 Nov 2020 05:42 )             34.2     11-02    143  |  109<H>  |  17  ----------------------------<  175<H>  3.4<L>   |  26  |  0.95    Ca    9.1      02 Nov 2020 05:42  Phos  3.5     11-02  Mg     2.3     11-02    TPro  7.1  /  Alb  2.8<L>  /  TBili  0.6  /  DBili  x   /  AST  39<H>  /  ALT  34  /  AlkPhos  74  11-01          ABG - ( 31 Oct 2020 22:41 )  pH, Arterial: 7.40  pH, Blood: x     /  pCO2: 38    /  pO2: 80    / HCO3: 23    / Base Excess: -1.3  /  SaO2: 95                  WBC:  WBC Count: 7.82 K/uL (11-02 @ 05:42)  WBC Count: 11.74 K/uL (11-01 @ 05:46)  WBC Count: 6.31 K/uL (10-31 @ 05:07)  WBC Count: 10.22 K/uL (10-30 @ 06:48)  WBC Count: 20.99 K/uL (10-29 @ 22:39)      MICROBIOLOGY:  RECENT CULTURES:  10-31 .Sputum Sputum XXXX   Few Squamous epithelial cells per low power field  Few polymorphonuclear leukocytes per low power field  Rare Yeast like cells per oil power field  Rare Gram Negative Rods per oil power field   Mixed gram negative rods Culture includes  Few Pseudomonas aeruginosa  Normal Respiratory Elvira present    10-30 .Blood Blood-Peripheral Proteus mirabilis XXXX   No growth to date.                    Sodium:  Sodium, Serum: 143 mmol/L (11-02 @ 05:42)  Sodium, Serum: 147 mmol/L (11-01 @ 05:46)  Sodium, Serum: 149 mmol/L (10-31 @ 05:07)  Sodium, Serum: 150 mmol/L (10-30 @ 06:48)  Sodium, Serum: 146 mmol/L (10-29 @ 22:39)      0.95 mg/dL 11-02 @ 05:42  1.00 mg/dL 11-01 @ 05:46  1.00 mg/dL 10-31 @ 05:07  1.40 mg/dL 10-30 @ 06:48  2.29 mg/dL 10-29 @ 22:39      Hemoglobin:  Hemoglobin: 11.1 g/dL (11-02 @ 05:42)  Hemoglobin: 11.3 g/dL (11-01 @ 05:46)  Hemoglobin: 10.0 g/dL (10-31 @ 05:07)  Hemoglobin: 11.6 g/dL (10-30 @ 06:48)  Hemoglobin: 13.2 g/dL (10-29 @ 22:39)      Platelets: Platelet Count - Automated: 225 K/uL (11-02 @ 05:42)  Platelet Count - Automated: 197 K/uL (11-01 @ 05:46)  Platelet Count - Automated: 156 K/uL (10-31 @ 05:07)  Platelet Count - Automated: 196 K/uL (10-30 @ 06:48)  Platelet Count - Automated: 332 K/uL (10-29 @ 22:39)      LIVER FUNCTIONS - ( 01 Nov 2020 05:46 )  Alb: 2.8 g/dL / Pro: 7.1 g/dL / ALK PHOS: 74 U/L / ALT: 34 U/L / AST: 39 U/L / GGT: x                 RADIOLOGY & ADDITIONAL STUDIES:

## 2020-11-02 NOTE — PROGRESS NOTE ADULT - ASSESSMENT
75 yo F PMH frontotemporal dementia, aphasic stroke, chronic trach & peg, bed bound, insulin dependent DM, covid in march, HTN, recent PNA admitted for severe sepsis 2/2 uti.    Neuro: no acute issues. c/w transdermal fentanyl patch  Cardio: hemodynamically stable. TTE shows EF 60-65%, Grade 1 LV diastolic dysfunction, MR, TR. ASA 81 daily through peg tube. Strict Is/Os  Pulm: continue with mechanical ventilation. CXR clear. 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  GI: Glucerna feeds through peg. 250 cc free water q6 for hypernatremia. c/w fleet enema, famotidine, psyllium, sucralfate, and lactobillus  Renal: prerenal RYAN 2/2 sepsis with lactic acidosis improving with fluid administration. Will c/w IVF, lactate trending down 3.5 --> 2.8). Chronic vaughn for urinary retention  ID: urosepsis - on zosyn 3.375 q8, Ucx grew proteus, f/u urine sensitivities, f/u blood cultures NGTD, sputum culture  Endo: hypoglycemia likely 2/2 home basal insulin & NPO status. ISS for insulin dependent diabetes, HbA1C 6.6  heme: 40 mg SQ LVX DVT ppx   73 yo F PMH frontotemporal dementia, aphasic stroke, chronic trach & peg, bed bound, insulin dependent DM, covid in march, HTN, recent PNA admitted for severe sepsis 2/2 uti.    Neuro: no acute issues. c/w transdermal fentanyl patch  Cardio: hemodynamically stable. TTE shows EF 60-65%, Grade 1 LV diastolic dysfunction, MR, TR. ASA 81 daily through peg tube. Strict Is/Os  Pulm: continue with mechanical ventilation. CXR clear. continue albuterol & ipatropium 4 puffs q6 for secretions, tobramycin discontinued. 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  GI: Glucerna feeds through peg. 250 cc free water q6 for hypernatremia. c/w fleet enema, famotidine, psyllium, sucralfate, and lactobillus  Renal: prerenal RYAN 2/2 sepsis with lactic acidosis improving with fluid administration. Will c/w IVF, lactate trending down 3.5 --> 2.8). Chronic vaughn for urinary retention. Hypokalemia - K repleted this AM, will f/u BMP   ID: urosepsis - Ucx grew proteus resistant to nitrofurantoin, coverage switched from zosyn to  ceftriaxone 1 g daily.blood cultures NGTD, sputum culture with few psuedomonas, likely colonization  Endo: ISS for insulin dependent diabetes, HbA1C 6.6  heme: 40 mg SQ LVX DVT ppx   75 yo F PMH frontotemporal dementia, aphasic stroke, chronic trach & peg, bed bound, insulin dependent DM, covid in march, HTN, recent PNA admitted for severe sepsis 2/2 uti.    Neuro: no acute issues. c/w transdermal fentanyl patch  Cardio: hemodynamically stable. TTE shows EF 60-65%, Grade 1 LV diastolic dysfunction, MR, TR. ASA 81 daily through peg tube. Strict Is/Os  Pulm: continue with mechanical ventilation. CXR clear. continue albuterol & ipatropium 4 puffs q6 for secretions, tobramycin discontinued. 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  GI: Glucerna feeds through peg. 250 cc free water q6 for hypernatremia. c/w fleet enema, famotidine, psyllium, sucralfate, and lactobillus  Renal: prerenal RYAN 2/2 sepsis with lactic acidosis improving with fluid administration. Will c/w IVF, lactate trending down 3.5 --> 2.8). Chronic vaughn for urinary retention. Hypokalemia - K repleted this AM, will f/u BMP   ID: urosepsis - Ucx grew proteus resistant to nitrofurantoin, coverage switched from zosyn to  ceftriaxone 1 g daily for a total of a 14 day course (last day 11/12) blood cultures NGTD, sputum culture with few psuedomonas, likely colonization  Endo: ISS for insulin dependent diabetes, HbA1C 6.6  heme: 40 mg SQ LVX DVT ppx   75 yo F PMH frontotemporal dementia, aphasic stroke, chronic trach & peg, bed bound, insulin dependent DM, covid in march, HTN, recent PNA admitted for severe sepsis 2/2 uti.    Neuro: no acute issues. c/w transdermal fentanyl patch  Cardio: hemodynamically stable. TTE shows EF 60-65%, Grade 1 LV diastolic dysfunction, MR, TR. ASA 81 daily through peg tube. Strict Is/Os  Pulm: continue with mechanical ventilation. CXR clear. continue albuterol & ipatropium 4 puffs q6 for secretions, tobramycin discontinued. 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  GI: Glucerna feeds through peg. 250 cc free water q6 for hypernatremia. c/w fleet enema, famotidine, psyllium, sucralfate, and lactobillus  Renal: prerenal RYAN 2/2 sepsis with lactic acidosis improving with fluid administration. Will c/w IVF, lactate trending down 3.5 --> 2.8. Chronic vaughn for urinary retention. Hypokalemia - K repleted this AM, will f/u BMP   ID: Urosepsis - Ucx grew proteus resistant to nitrofurantoin, coverage switched from zosyn to ceftriaxone 1 g daily for a total of a 14 day course (last day 11/12), blood cultures NGTD, sputum culture with few psuedomonas, likely colonization  Endo: ISS for insulin dependent diabetes, HbA1C 6.6  heme: 40 mg SQ LVX DVT ppx

## 2020-11-02 NOTE — PROGRESS NOTE ADULT - SUBJECTIVE AND OBJECTIVE BOX
[facility]       HPI:  73 yo Female sent from [facility] with respiratory distress. No report of fever or hypoxia. Patient trached/vented ,s/p peg  and non-verbal, unable to contribute to history. Transferred to [facility] from [facility] ED for admission Admitted for septic workup and evaluation ,send blood and urine cx,serial lactate levels,monitor vitals closely hydration,monitor urine output and renal profile, iv abx initiated -s/p vanco and zosyn . BP stable upon [facility] ED arrival Med hx is significant for Advanced dementia ,s/p  Aphasic stroke    Constipation  ,COVID-19   ,CVA (cerebral vascular accident)  ,Dementia of frontal lobe type  ,Diabetes mellitus  ,DM (diabetes mellitus)  ,GERD (gastroesophageal reflux disease)    HTN (hypertension)  ,Hypertension  ,Pneumonia  ,Quadriplegia  ,Respiratory failure ,s/p tracheostomy and peg .Found to have RYAN ,hypernatremia and lactate elevated Admit for iv hydration,monitor renal profile and urine output,nutritionist consult,prealbumin level,serial bmp,nutritional supplements, Palliative care consult requested ,to discuss advance directives and complete MOLST  (30 Oct 2020 07:14)        SUBJECTIVE:      ALLERGIES:  Allergies    codeine (Hives)    Intolerances          MEDICATIONS  (STANDING):  ALBUTerol    90 MICROgram(s) HFA Inhaler 4 Puff(s) Inhalation every 6 hours  aspirin  chewable 81 milliGRAM(s) Oral daily  calcium carbonate 1250 mG  + Vitamin D (OsCal 500 + D) 1 Tablet(s) Oral two times a day  cefTRIAXone   IVPB 1000 milliGRAM(s) IV Intermittent every 24 hours  chlorhexidine 2% Cloths 1 Application(s) Topical daily  dextrose 5%. 1000 milliLiter(s) (50 mL/Hr) IV Continuous <Continuous>  enoxaparin Injectable 40 milliGRAM(s) SubCutaneous daily  famotidine    Tablet 20 milliGRAM(s) Oral daily  fentaNYL   Patch  12 MICROgram(s)/Hr 1 Patch Transdermal every 72 hours  insulin lispro (ADMELOG) corrective regimen sliding scale   SubCutaneous every 6 hours  ipratropium 17 MICROgram(s) HFA Inhaler 4 Puff(s) Inhalation every 6 hours  lactobacillus acidophilus 1 Tablet(s) Oral two times a day  psyllium Powder 1 Packet(s) Oral at bedtime  saline laxative (FLEET) Rectal Enema 1 Enema Rectal at bedtime  sucralfate 1 Gram(s) Oral two times a day    MEDICATIONS  (PRN):  atropine 1% Ophthalmic Solution for SubLingual Use 1 Drop(s) SubLingual three times a day PRN salicary secretion  dextrose 40% Gel 15 Gram(s) Oral once PRN Blood Glucose LESS THAN 70 milliGRAM(s)/deciliter  glucagon  Injectable 1 milliGRAM(s) IntraMuscular once PRN Glucose LESS THAN 70 milligrams/deciliter  hydrALAZINE Injectable 10 milliGRAM(s) IV Push every 4 hours PRN SBP > 150      REVIEW OF SYSTEMS:  CONSTITUTIONAL: No fever,  RESPIRATORY: No cough, wheezing, shortness of breath  CARDIOVASCULAR: No chest pain, dyspnea, palpitations, dizziness, syncope, paroxysmal nocturnal dyspnea, orthopnea, or arm or leg swelling  GASTROINTESTINAL: No abdominal  or epigastric pain, nausea, vomiting,  diarrhea  NEUROLOGICAL: No headaches,  loss of strength, numbness, or tremors    Vital Signs Last 24 Hrs  T(C): 37.1 (02 Nov 2020 16:00), Max: 37.2 (01 Nov 2020 23:17)  T(F): 98.7 (02 Nov 2020 16:00), Max: 98.9 (01 Nov 2020 23:17)  HR: 90 (02 Nov 2020 17:22) (75 - 108)  BP: 134/64 (02 Nov 2020 13:00) (123/59 - 205/103)  BP(mean): 92 (02 Nov 2020 13:00) (85 - 142)  RR: 23 (02 Nov 2020 13:00) (14 - 32)  SpO2: 100% (02 Nov 2020 17:22) (90% - 100%)    PHYSICAL EXAM:  HEAD:  Atraumatic, Normocephalic  NECK: Supple and normal thyroid.  No JVD or carotid bruit.   HEART: S1, S2 regular , 1/6 soft ejection systolic murmur in mitral area , no thrill and no gallops .  PULMONARY: Bilateral vesicular breathing , few scattered ronchi and few scattered rales are present .  ABDOMEN: Soft nontender and positive bowl sounds   EXTREMITIES:  No clubbing, cyanosis, or pedal  edema  NEUROLOGICAL: AAOX3 , no focal deficit .    LABS:                        11.1   7.82  )-----------( 225      ( 02 Nov 2020 05:42 )             34.2     11-02    143  |  109<H>  |  17  ----------------------------<  175<H>  3.4<L>   |  26  |  0.95    Ca    9.1      02 Nov 2020 05:42  Phos  3.5     11-02  Mg     2.3     11-02    TPro  7.1  /  Alb  2.8<L>  /  TBili  0.6  /  DBili  x   /  AST  39<H>  /  ALT  34  /  AlkPhos  74  11-01            BNP      EKG:  ECHO:  IMAGING:    Assessment/Plan    Will continue to follow during hospital course and give further recommendations as needed . Thanks for your referral . if any questions please contact me at office ([phone] [phone])

## 2020-11-02 NOTE — PROGRESS NOTE ADULT - SUBJECTIVE AND OBJECTIVE BOX
Date/Time Patient Seen:  		  Referring MD:   Data Reviewed	       Patient is a 74y old  Female who presents with a chief complaint of SOB (01 Nov 2020 21:20)      Subjective/HPI     PAST MEDICAL & SURGICAL HISTORY:  Pneumonia    Quadriplegia    COVID-19 virus detected    Advanced dementia    DM (diabetes mellitus)    HTN (hypertension)    CVA (cerebral vascular accident)    Respiratory failure    Constipation    GERD (gastroesophageal reflux disease)    Hypertension    Respiratory failure    Diabetes mellitus    Aphasic stroke    Dementia of frontal lobe type    Feeding by G-tube    Tracheostomy tube present    Tracheostomy in place    Gastrostomy in place    Hx of appendectomy          Medication list         MEDICATIONS  (STANDING):  ALBUTerol    90 MICROgram(s) HFA Inhaler 4 Puff(s) Inhalation every 6 hours  aspirin  chewable 81 milliGRAM(s) Oral daily  calcium carbonate 1250 mG  + Vitamin D (OsCal 500 + D) 1 Tablet(s) Oral two times a day  chlorhexidine 2% Cloths 1 Application(s) Topical daily  dextrose 5%. 1000 milliLiter(s) (50 mL/Hr) IV Continuous <Continuous>  enoxaparin Injectable 40 milliGRAM(s) SubCutaneous daily  famotidine    Tablet 20 milliGRAM(s) Oral daily  fentaNYL   Patch  12 MICROgram(s)/Hr 1 Patch Transdermal every 72 hours  insulin lispro (ADMELOG) corrective regimen sliding scale   SubCutaneous every 6 hours  ipratropium 17 MICROgram(s) HFA Inhaler 4 Puff(s) Inhalation every 6 hours  lactobacillus acidophilus 1 Tablet(s) Oral two times a day  piperacillin/tazobactam IVPB.. 3.375 Gram(s) IV Intermittent every 8 hours  psyllium Powder 1 Packet(s) Oral at bedtime  saline laxative (FLEET) Rectal Enema 1 Enema Rectal at bedtime  sucralfate 1 Gram(s) Oral two times a day  tobramycin for Nebulization 300 milliGRAM(s) Inhalation every 12 hours    MEDICATIONS  (PRN):  atropine 1% Ophthalmic Solution for SubLingual Use 1 Drop(s) SubLingual three times a day PRN salicary secretion  dextrose 40% Gel 15 Gram(s) Oral once PRN Blood Glucose LESS THAN 70 milliGRAM(s)/deciliter  glucagon  Injectable 1 milliGRAM(s) IntraMuscular once PRN Glucose LESS THAN 70 milligrams/deciliter  hydrALAZINE Injectable 10 milliGRAM(s) IV Push every 4 hours PRN SBP > 150         Vitals log        ICU Vital Signs Last 24 Hrs  T(C): 37 (02 Nov 2020 04:00), Max: 37.2 (01 Nov 2020 23:17)  T(F): 98.6 (02 Nov 2020 04:00), Max: 98.9 (01 Nov 2020 23:17)  HR: 84 (02 Nov 2020 07:00) (63 - 108)  BP: 128/61 (02 Nov 2020 07:00) (89/50 - 205/103)  BP(mean): 88 (02 Nov 2020 07:00) (68 - 142)  ABP: --  ABP(mean): --  RR: 14 (02 Nov 2020 07:00) (14 - 32)  SpO2: 99% (02 Nov 2020 07:00) (89% - 100%)       Mode: VC+  RR (machine): 14  TV (machine): 400  FiO2: 30  PEEP: 5  ITime: 1  MAP: 11  PIP: 26      Input and Output:  I&O's Detail    01 Nov 2020 07:01  -  02 Nov 2020 07:00  --------------------------------------------------------  IN:    Enteral Tube Flush: 390 mL    Free Water: 1250 mL    Glucerna: 1000 mL    IV PiggyBack: 300 mL    Lactated Ringers: 75 mL  Total IN: 3015 mL    OUT:    Indwelling Catheter - Urethral (mL): 1850 mL  Total OUT: 1850 mL    Total NET: 1165 mL          Lab Data                        11.1   7.82  )-----------( 225      ( 02 Nov 2020 05:42 )             34.2     11-02    143  |  109<H>  |  17  ----------------------------<  175<H>  3.4<L>   |  26  |  0.95    Ca    9.1      02 Nov 2020 05:42  Phos  3.5     11-02  Mg     2.3     11-02    TPro  7.1  /  Alb  2.8<L>  /  TBili  0.6  /  DBili  x   /  AST  39<H>  /  ALT  34  /  AlkPhos  74  11-01    ABG - ( 31 Oct 2020 22:41 )  pH, Arterial: 7.40  pH, Blood: x     /  pCO2: 38    /  pO2: 80    / HCO3: 23    / Base Excess: -1.3  /  SaO2: 95                      Review of Systems	      Objective     Physical Examination    heart s1s2  lung dec BS  abd soft  vent support  trach  peg      Pertinent Lab findings & Imaging      Annalise:  NO   Adequate UO     I&O's Detail    01 Nov 2020 07:01  -  02 Nov 2020 07:00  --------------------------------------------------------  IN:    Enteral Tube Flush: 390 mL    Free Water: 1250 mL    Glucerna: 1000 mL    IV PiggyBack: 300 mL    Lactated Ringers: 75 mL  Total IN: 3015 mL    OUT:    Indwelling Catheter - Urethral (mL): 1850 mL  Total OUT: 1850 mL    Total NET: 1165 mL               Discussed with:     Cultures:	        Radiology

## 2020-11-02 NOTE — CHART NOTE - NSCHARTNOTEFT_GEN_A_CORE
Assessment/Follow up: Pt continues on peg feedings of Glucerna 1.5 @ goal rate 50cc/hr x 20hrs, free water flush 250cc q 6hrs. Tf well tolerated. No residuals documented past 3 days. Small loose brown BM 11/2. S/p fleet enema. Metamucil, lactobacillus rx. Per TF Pump study pt received 908ml TF over past 24hrs (91% estimated energy needs) and 1637ml over past 48hrs (73% estimated energy needs preceding day). Encourage minimizing TF interruptions to allow pt to meet estimated energy/pro needs.     Factors impacting intake: [ ] none [ ] nausea  [ ] vomiting [ ] diarrhea [ ] constipation  [ ]chewing problems [x ] swallowing issues  [x ] other: trach to vent    Diet Presciption: Diet, NPO with Tube Feed:   Tube Feeding Modality: Gastrostomy  Glucerna 1.5  Total Volume for 24 Hours (mL): 1000  Continuous  Starting Tube Feed Rate {mL per Hour}: 30  Increase Tube Feed Rate by (mL): 10     Every 8 hours  Until Goal Tube Feed Rate (mL per Hour): 50  Tube Feed Duration (in Hours): 20  Tube Feed Start Time: 06:00 (10-30-20 @ 15:22)    Current Weight: Weight (kg): 56.6 (10-30 @ 03:15), 56.7 (10-29 @ 22:25); request current weight to assess, noted dependent 2+edema, geovanny arms 2+edema     Pertinent Medications: MEDICATIONS  (STANDING):  ALBUTerol    90 MICROgram(s) HFA Inhaler 4 Puff(s) Inhalation every 6 hours  aspirin  chewable 81 milliGRAM(s) Oral daily  calcium carbonate 1250 mG  + Vitamin D (OsCal 500 + D) 1 Tablet(s) Oral two times a day  chlorhexidine 2% Cloths 1 Application(s) Topical daily  dextrose 5%. 1000 milliLiter(s) (50 mL/Hr) IV Continuous <Continuous>  enoxaparin Injectable 40 milliGRAM(s) SubCutaneous daily  famotidine    Tablet 20 milliGRAM(s) Oral daily  fentaNYL   Patch  12 MICROgram(s)/Hr 1 Patch Transdermal every 72 hours  insulin lispro (ADMELOG) corrective regimen sliding scale   SubCutaneous every 6 hours  ipratropium 17 MICROgram(s) HFA Inhaler 4 Puff(s) Inhalation every 6 hours  lactobacillus acidophilus 1 Tablet(s) Oral two times a day  piperacillin/tazobactam IVPB.. 3.375 Gram(s) IV Intermittent every 8 hours  potassium chloride   Powder 20 milliEquivalent(s) Oral every 2 hours  psyllium Powder 1 Packet(s) Oral at bedtime  saline laxative (FLEET) Rectal Enema 1 Enema Rectal at bedtime  sucralfate 1 Gram(s) Oral two times a day  tobramycin for Nebulization 300 milliGRAM(s) Inhalation every 12 hours    MEDICATIONS  (PRN):  atropine 1% Ophthalmic Solution for SubLingual Use 1 Drop(s) SubLingual three times a day PRN salicary secretion  dextrose 40% Gel 15 Gram(s) Oral once PRN Blood Glucose LESS THAN 70 milliGRAM(s)/deciliter  glucagon  Injectable 1 milliGRAM(s) IntraMuscular once PRN Glucose LESS THAN 70 milligrams/deciliter  hydrALAZINE Injectable 10 milliGRAM(s) IV Push every 4 hours PRN SBP > 150    Pertinent Labs: 11-02 Na143 mmol/L Glu 175 mg/dL<H> K+ 3.4 mmol/L<L> Cr  0.95 mg/dL BUN 17 mg/dL 11-02 Phos 3.5 mg/dL 11-01 Alb 2.8 g/dL<L>     CAPILLARY BLOOD GLUCOSE      POCT Blood Glucose.: 165 mg/dL (02 Nov 2020 05:24)  POCT Blood Glucose.: 163 mg/dL (02 Nov 2020 00:19)  POCT Blood Glucose.: 149 mg/dL (01 Nov 2020 17:43)  POCT Blood Glucose.: 169 mg/dL (01 Nov 2020 12:03)    Skin: intact. Luke 11.     Estimated Needs:   [ x] no change since previous assessment  [ ] recalculated:     Previous Nutrition Diagnosis: N/A   [ ] Inadequate Energy Intake [ ]Inadequate Oral Intake [ ] Excessive Energy Intake   [ ] Underweight [ ] Increased Nutrient Needs [ ] Overweight/Obesity   [ ] Altered GI Function [ ] Unintended Weight Loss [ ] Food & Nutrition Related Knowledge Deficit [ ] Malnutrition     Nutrition Diagnosis is [ ] ongoing  [ ] resolved [x ] not applicable     New Nutrition Diagnosis: [ x] not applicable       Interventions:   Recommend  [ ] Change Diet To:  [ ] Nutrition Supplement  [ ] Nutrition Support  [x ] Other: Current TF appropriate & well tolerated. Encourage minimizing TF interruptions to allow pt to meet estimated energy/pro needs. MVI with minerals daily (liquid) via peg.     Monitoring and Evaluation:   [ ] PO intake [ x ] Tolerance to diet prescription [ x ] weights [ x ] labs[ x ] follow up per protocol  [ ] other:

## 2020-11-02 NOTE — PROGRESS NOTE ADULT - SUBJECTIVE AND OBJECTIVE BOX
Patient is a 74y old  Female who presents with a chief complaint of SOB (01 Nov 2020 21:20)    24 hour events: ***    REVIEW OF SYSTEMS  Constitutional: No fever, chills, fatigue  Neuro: No headache, numbness, weakness  Resp: No cough, wheezing, shortness of breath  CVS: No chest pain, palpitations, leg swelling  GI: No abdominal pain, nausea, vomiting, diarrhea   : No dysuria, frequency, incontinence  Skin: No itching, burning, rashes, or lesions   Msk: No joint pain or swelling  Psych: No depression, anxiety, mood swings  Heme: No bleeding    T(F): 98.6 (11-02-20 @ 04:00), Max: 98.9 (11-01-20 @ 23:17)  HR: 84 (11-02-20 @ 07:00) (63 - 108)  BP: 128/61 (11-02-20 @ 07:00) (89/50 - 205/103)  RR: 14 (11-02-20 @ 07:00) (14 - 32)  SpO2: 99% (11-02-20 @ 07:00) (89% - 100%)  Wt(kg): --    Mode: VC+, RR (machine): 14, TV (machine): 400, FiO2: 30, PEEP: 5        I&O's Summary    11-01 @ 07:01  -  11-02 @ 07:00  --------------------------------------------------------  IN: 3015 mL / OUT: 1850 mL / NET: 1165 mL      PHYSICAL EXAM  General:   CNS:   HEENT:   Resp:   CVS:   Abd:   Ext:   Skin:     MEDICATIONS  piperacillin/tazobactam IVPB.. IV Intermittent  tobramycin for Nebulization Inhalation    hydrALAZINE Injectable IV Push PRN    dextrose 40% Gel Oral PRN  glucagon  Injectable IntraMuscular PRN  insulin lispro (ADMELOG) corrective regimen sliding scale SubCutaneous    ALBUTerol    90 MICROgram(s) HFA Inhaler Inhalation  ipratropium 17 MICROgram(s) HFA Inhaler Inhalation    fentaNYL   Patch  12 MICROgram(s)/Hr Transdermal      aspirin  chewable Oral  enoxaparin Injectable SubCutaneous    famotidine    Tablet Oral  psyllium Powder Oral  saline laxative (FLEET) Rectal Enema Rectal  sucralfate Oral      calcium carbonate 1250 mG  + Vitamin D (OsCal 500 + D) Oral  dextrose 5%. IV Continuous      atropine 1% Ophthalmic Solution for SubLingual Use SubLingual PRN  chlorhexidine 2% Cloths Topical    lactobacillus acidophilus Oral                          11.1   7.82  )-----------( 225      ( 02 Nov 2020 05:42 )             34.2       11-02    143  |  109<H>  |  17  ----------------------------<  175<H>  3.4<L>   |  26  |  0.95    Ca    9.1      02 Nov 2020 05:42  Phos  3.5     11-02  Mg     2.3     11-02    TPro  7.1  /  Alb  2.8<L>  /  TBili  0.6  /  DBili  x   /  AST  39<H>  /  ALT  34  /  AlkPhos  74  11-01              .Sputum Sputum   Mixed gram negative rods Culture includes  Few Pseudomonas aeruginosa  Normal Respiratory Elvira present   Few Squamous epithelial cells per low power field  Few polymorphonuclear leukocytes per low power field  Rare Yeast like cells per oil power field  Rare Gram Negative Rods per oil power field 10-31 @ 01:25  .Blood Blood-Peripheral   No growth to date. -- 10-30 @ 13:18        Radiology: ***  Bedside lung ultrasound: ***  Bedside ECHO: ***    CENTRAL LINE: Y/N          DATE INSERTED:              REMOVE: Y/N  REID: Y/N                        DATE INSERTED:              REMOVE: Y/N  A-LINE: Y/N                       DATE INSERTED:              REMOVE: Y/N    GLOBAL ISSUE/BEST PRACTICE  Analgesia:   Sedation:   CAM-ICU:   HOB elevation: yes  Stress ulcer prophylaxis:   VTE prophylaxis:   Glycemic control:   Nutrition:     CODE STATUS: ***  [facility] discussion: Y       Patient is a 74y old  Female who presents with a chief complaint of SOB (01 Nov 2020 21:20)    24 hour events: no acute events overnight. Switched from zosyn to ceftriaxone. Has remained afebrile and hemodynamically stable overnight    REVIEW OF SYSTEMS  unable to assess 2/2 mental status    T(F): 98.6 (11-02-20 @ 04:00), Max: 98.9 (11-01-20 @ 23:17)  HR: 84 (11-02-20 @ 07:00) (63 - 108)  BP: 128/61 (11-02-20 @ 07:00) (89/50 - 205/103)  RR: 14 (11-02-20 @ 07:00) (14 - 32)  SpO2: 99% (11-02-20 @ 07:00) (89% - 100%)  Wt(kg): --    Mode: VC+, RR (machine): 14, TV (machine): 400, FiO2: 30, PEEP: 5        I&O's Summary    11-01 @ 07:01  -  11-02 @ 07:00  --------------------------------------------------------  IN: 3015 mL / OUT: 1850 mL / NET: 1165 mL      PHYSICAL EXAM  General:  frail appearing woman, awake, intubated, trached  CNS: contracted bedbound, does not follow commands, withdraws from painful stimuli, open eyes to name   HEENT: PERRL  Resp: CTABL, trache sight clean, no discharge  CVS: rrr no m/r/g  Abd: soft, NT, nondistended, peg site clean  Ext: no extremity edema  Skin: warm & perfused    MEDICATIONS  piperacillin/tazobactam IVPB.. IV Intermittent  tobramycin for Nebulization Inhalation  hydrALAZINE Injectable IV Push PRN  dextrose 40% Gel Oral PRN  glucagon  Injectable IntraMuscular PRN  insulin lispro (ADMELOG) corrective regimen sliding scale SubCutaneous  ALBUTerol    90 MICROgram(s) HFA Inhaler Inhalation  ipratropium 17 MICROgram(s) HFA Inhaler Inhalation  fentaNYL   Patch  12 MICROgram(s)/Hr Transdermal  aspirin  chewable Oral  enoxaparin Injectable SubCutaneous  famotidine    Tablet Oral  psyllium Powder Oral  saline laxative (FLEET) Rectal Enema Rectal  sucralfate Oral  calcium carbonate 1250 mG  + Vitamin D (OsCal 500 + D) Oral  dextrose 5%. IV Continuous  atropine 1% Ophthalmic Solution for SubLingual Use SubLingual PRN  chlorhexidine 2% Cloths Topical  lactobacillus acidophilus Oral                          11.1   7.82  )-----------( 225      ( 02 Nov 2020 05:42 )             34.2       11-02    143  |  109<H>  |  17  ----------------------------<  175<H>  3.4<L>   |  26  |  0.95    Ca    9.1      02 Nov 2020 05:42  Phos  3.5     11-02  Mg     2.3     11-02    TPro  7.1  /  Alb  2.8<L>  /  TBili  0.6  /  DBili  x   /  AST  39<H>  /  ALT  34  /  AlkPhos  74  11-01  \        .Sputum Sputum   Mixed gram negative rods Culture includes  Few Pseudomonas aeruginosa  Normal Respiratory Elvira present   Few Squamous epithelial cells per low power field  Few polymorphonuclear leukocytes per low power field  Rare Yeast like cells per oil power field  Rare Gram Negative Rods per oil power field 10-31 @ 01:25  .Blood Blood-Peripheral   No growth to date. -- 10-30 @ 13:18        Radiology: no new imaging      REID: Y                        DATE INSERTED:              REMOVE: Y/N    GLOBAL ISSUE/BEST PRACTICE  Analgesia: Y  Sedation: N  HOB elevation: yes  Stress ulcer prophylaxis: Y  VTE prophylaxis: Y  Glycemic control: ISS  Nutrition: Glucerna feeds through PEG    CODE STATUS: *  [facility] discussion: Y       Patient is a 74y old  Female who presents with a chief complaint of SOB (01 Nov 2020 21:20)    24 hour events: no acute events overnight. Switched from zosyn to ceftriaxone. Has remained afebrile and hemodynamically stable overnight    REVIEW OF SYSTEMS  unable to assess 2/2 mental status    T(F): 98.6 (11-02-20 @ 04:00), Max: 98.9 (11-01-20 @ 23:17)  HR: 84 (11-02-20 @ 07:00) (63 - 108)  BP: 128/61 (11-02-20 @ 07:00) (89/50 - 205/103)  RR: 14 (11-02-20 @ 07:00) (14 - 32)  SpO2: 99% (11-02-20 @ 07:00) (89% - 100%)  Wt(kg): --    Mode: VC+, RR (machine): 14, TV (machine): 400, FiO2: 30, PEEP: 5        I&O's Summary    11-01 @ 07:01  -  11-02 @ 07:00  --------------------------------------------------------  IN: 3015 mL / OUT: 1850 mL / NET: 1165 mL      PHYSICAL EXAM  General:  frail appearing woman, awake, intubated, trached  CNS: contracted bedbound, does not follow commands, withdraws from painful stimuli, open eyes to name   HEENT: PERRL  Resp: CTABL, trache sight clean, no discharge  CVS: rrr no m/r/g  Abd: soft, NT, nondistended, peg site clean  Ext: no extremity edema  Skin: warm & perfused    MEDICATIONS  piperacillin/tazobactam IVPB.. IV Intermittent  tobramycin for Nebulization Inhalation  hydrALAZINE Injectable IV Push PRN  dextrose 40% Gel Oral PRN  glucagon  Injectable IntraMuscular PRN  insulin lispro (ADMELOG) corrective regimen sliding scale SubCutaneous  ALBUTerol    90 MICROgram(s) HFA Inhaler Inhalation  ipratropium 17 MICROgram(s) HFA Inhaler Inhalation  fentaNYL   Patch  12 MICROgram(s)/Hr Transdermal  aspirin  chewable Oral  enoxaparin Injectable SubCutaneous  famotidine    Tablet Oral  psyllium Powder Oral  saline laxative (FLEET) Rectal Enema Rectal  sucralfate Oral  calcium carbonate 1250 mG  + Vitamin D (OsCal 500 + D) Oral  dextrose 5%. IV Continuous  atropine 1% Ophthalmic Solution for SubLingual Use SubLingual PRN  chlorhexidine 2% Cloths Topical  lactobacillus acidophilus Oral                          11.1   7.82  )-----------( 225      ( 02 Nov 2020 05:42 )             34.2       11-02    143  |  109<H>  |  17  ----------------------------<  175<H>  3.4<L>   |  26  |  0.95    Ca    9.1      02 Nov 2020 05:42  Phos  3.5     11-02  Mg     2.3     11-02    TPro  7.1  /  Alb  2.8<L>  /  TBili  0.6  /  DBili  x   /  AST  39<H>  /  ALT  34  /  AlkPhos  74  11-01  \        .Sputum Sputum   Mixed gram negative rods Culture includes  Few Pseudomonas aeruginosa  Normal Respiratory Elvira present   Few Squamous epithelial cells per low power field  Few polymorphonuclear leukocytes per low power field  Rare Yeast like cells per oil power field  Rare Gram Negative Rods per oil power field 10-31 @ 01:25  .Blood Blood-Peripheral   No growth to date. -- 10-30 @ 13:18        Radiology: no new imaging      [place]ANK[place]: HEVER                          DATE INSERTED: 10/30/2020     GLOBAL ISSUE/BEST PRACTICE  Analgesia: Y  Sedation: N  HOB elevation: yes  Stress ulcer prophylaxis: Y  VTE prophylaxis: Y  Glycemic control: ISS  Nutrition: Glucerna feeds through PEG    CODE STATUS: Full code  [facility] discussion: Y       Patient is a 74y old  Female who presents with a chief complaint of SOB (01 Nov 2020 21:20)    24 hour events: no acute events overnight.     REVIEW OF SYSTEMS  unable to assess 2/2 mental status    T(F): 98.6 (11-02-20 @ 04:00), Max: 98.9 (11-01-20 @ 23:17)  HR: 84 (11-02-20 @ 07:00) (63 - 108)  BP: 128/61 (11-02-20 @ 07:00) (89/50 - 205/103)  RR: 14 (11-02-20 @ 07:00) (14 - 32)  SpO2: 99% (11-02-20 @ 07:00) (89% - 100%)  Wt(kg): --    Mode: VC+, RR (machine): 14, TV (machine): 400, FiO2: 30, PEEP: 5        I&O's Summary    11-01 @ 07:01  -  11-02 @ 07:00  --------------------------------------------------------  IN: 3015 mL / OUT: 1850 mL / NET: 1165 mL      PHYSICAL EXAM  General:  frail appearing woman, awake but unresponsive, intubated, trached  CNS: contracted bedbound, does not follow commands, withdraws from painful stimuli, open eyes to name   HEENT: PERRL  Resp: CTABL, trache sight clean, no discharge  CVS: rrr no m/r/g  Abd: soft, NT, nondistended, peg site clean  Ext: no extremity edema  Skin: warm & perfused    MEDICATIONS  piperacillin/tazobactam IVPB.. IV Intermittent  tobramycin for Nebulization Inhalation  hydrALAZINE Injectable IV Push PRN  dextrose 40% Gel Oral PRN  glucagon  Injectable IntraMuscular PRN  insulin lispro (ADMELOG) corrective regimen sliding scale SubCutaneous  ALBUTerol    90 MICROgram(s) HFA Inhaler Inhalation  ipratropium 17 MICROgram(s) HFA Inhaler Inhalation  fentaNYL   Patch  12 MICROgram(s)/Hr Transdermal  aspirin  chewable Oral  enoxaparin Injectable SubCutaneous  famotidine    Tablet Oral  psyllium Powder Oral  saline laxative (FLEET) Rectal Enema Rectal  sucralfate Oral  calcium carbonate 1250 mG  + Vitamin D (OsCal 500 + D) Oral  dextrose 5%. IV Continuous  atropine 1% Ophthalmic Solution for SubLingual Use SubLingual PRN  chlorhexidine 2% Cloths Topical  lactobacillus acidophilus Oral                          11.1   7.82  )-----------( 225      ( 02 Nov 2020 05:42 )             34.2       11-02    143  |  109<H>  |  17  ----------------------------<  175<H>  3.4<L>   |  26  |  0.95    Ca    9.1      02 Nov 2020 05:42  Phos  3.5     11-02  Mg     2.3     11-02    TPro  7.1  /  Alb  2.8<L>  /  TBili  0.6  /  DBili  x   /  AST  39<H>  /  ALT  34  /  AlkPhos  74  11-01  \        .Sputum Sputum   Mixed gram negative rods Culture includes  Few Pseudomonas aeruginosa  Normal Respiratory Elvira present   Few Squamous epithelial cells per low power field  Few polymorphonuclear leukocytes per low power field  Rare Yeast like cells per oil power field  Rare Gram Negative Rods per oil power field 10-31 @ 01:25  .Blood Blood-Peripheral   No growth to date. -- 10-30 @ 13:18        Radiology: no new imaging      REID: HEVER                          DATE INSERTED: 10/30/2020     GLOBAL ISSUE/BEST PRACTICE  Analgesia: Y  Sedation: N  HOB elevation: yes  Stress ulcer prophylaxis: Y  VTE prophylaxis: Y  Glycemic control: ISS  Nutrition: Glucerna feeds through PEG    CODE STATUS: Full code

## 2020-11-02 NOTE — PROGRESS NOTE ADULT - SUBJECTIVE AND OBJECTIVE BOX
PROGRESS NOTE  Patient is a 74y old  Female who presents with a chief complaint of SOB (01 Nov 2020 21:20)      OVERNIGHT      HPI:  75 yo Female sent from [facility] with respiratory distress. No report of fever or hypoxia. Patient trached/vented ,s/p peg  and non-verbal, unable to contribute to history. Transferred to [place] from [place] ED for admission Admitted for septic workup and evaluation ,send blood and urine cx,serial lactate levels,monitor vitals closely hydration,monitor urine output and renal profile, iv abx initiated -s/p vanco and zosyn . BP stable upon [place] ED arrival Med hx is significant for Advanced dementia ,s/p  Aphasic stroke    Constipation  ,COVID-19   ,CVA (cerebral vascular accident)  ,Dementia of frontal lobe type  ,Diabetes mellitus  ,DM (diabetes mellitus)  ,GERD (gastroesophageal reflux disease)    HTN (hypertension)  ,Hypertension  ,Pneumonia  ,Quadriplegia  ,Respiratory failure ,s/p tracheostomy and peg .Found to have RYAN ,hypernatremia and lactate elevated Admit for iv hydration,monitor renal profile and urine output,nutritionist consult,prealbumin level,serial bmp,nutritional supplements, Palliative care consult requested ,to discuss advance directives and complete MOLST  (30 Oct 2020 07:14)    PAST MEDICAL & SURGICAL HISTORY:  Pneumonia    Quadriplegia    COVID-19 virus detected    Advanced dementia    DM (diabetes mellitus)    HTN (hypertension)    CVA (cerebral vascular accident)    Respiratory failure    Constipation    GERD (gastroesophageal reflux disease)    Hypertension    Respiratory failure    Diabetes mellitus    Aphasic stroke    Dementia of frontal lobe type    Feeding by G-tube    Tracheostomy tube present    Tracheostomy in place    Gastrostomy in place    Hx of appendectomy        MEDICATIONS  (STANDING):  ALBUTerol    90 MICROgram(s) HFA Inhaler 4 Puff(s) Inhalation every 6 hours  aspirin  chewable 81 milliGRAM(s) Oral daily  calcium carbonate 1250 mG  + Vitamin D (OsCal 500 + D) 1 Tablet(s) Oral two times a day  chlorhexidine 2% Cloths 1 Application(s) Topical daily  dextrose 5%. 1000 milliLiter(s) (50 mL/Hr) IV Continuous <Continuous>  enoxaparin Injectable 40 milliGRAM(s) SubCutaneous daily  famotidine    Tablet 20 milliGRAM(s) Oral daily  fentaNYL   Patch  12 MICROgram(s)/Hr 1 Patch Transdermal every 72 hours  insulin lispro (ADMELOG) corrective regimen sliding scale   SubCutaneous every 6 hours  ipratropium 17 MICROgram(s) HFA Inhaler 4 Puff(s) Inhalation every 6 hours  lactobacillus acidophilus 1 Tablet(s) Oral two times a day  piperacillin/tazobactam IVPB.. 3.375 Gram(s) IV Intermittent every 8 hours  potassium chloride   Powder 20 milliEquivalent(s) Oral every 2 hours  psyllium Powder 1 Packet(s) Oral at bedtime  saline laxative (FLEET) Rectal Enema 1 Enema Rectal at bedtime  sucralfate 1 Gram(s) Oral two times a day  tobramycin for Nebulization 300 milliGRAM(s) Inhalation every 12 hours    MEDICATIONS  (PRN):  atropine 1% Ophthalmic Solution for SubLingual Use 1 Drop(s) SubLingual three times a day PRN salicary secretion  dextrose 40% Gel 15 Gram(s) Oral once PRN Blood Glucose LESS THAN 70 milliGRAM(s)/deciliter  glucagon  Injectable 1 milliGRAM(s) IntraMuscular once PRN Glucose LESS THAN 70 milligrams/deciliter  hydrALAZINE Injectable 10 milliGRAM(s) IV Push every 4 hours PRN SBP > 150      OBJECTIVE    T(C): 37 (11-02-20 @ 04:00), Max: 37.2 (11-01-20 @ 23:17)  HR: 92 (11-02-20 @ 08:18) (63 - 108)  BP: 128/61 (11-02-20 @ 07:00) (89/50 - 205/103)  RR: 14 (11-02-20 @ 07:00) (14 - 32)  SpO2: 100% (11-02-20 @ 08:18) (89% - 100%)  Wt(kg): --  I&O's Summary    01 Nov 2020 07:01  -  02 Nov 2020 07:00  --------------------------------------------------------  IN: 3015 mL / OUT: 1850 mL / NET: 1165 mL          REVIEW OF SYSTEMS:  CONSTITUTIONAL: No fever, weight loss, or fatigue  EYES: No eye pain, visual disturbances, or discharge  ENMT:   No sinus or throat pain  NECK: No pain or stiffness  RESPIRATORY: No cough, wheezing, chills or hemoptysis; No shortness of breath  CARDIOVASCULAR: No chest pain, palpitations, dizziness, or leg swelling  GASTROINTESTINAL: No abdominal pain. No nausea, vomiting; No diarrhea or constipation. No melena or hematochezia.  GENITOURINARY: No dysuria, frequency, hematuria, or incontinence  NEUROLOGICAL: No headaches, memory loss, loss of strength, numbness, or tremors  SKIN: No itching, burning, rashes, or lesions   MUSCULOSKELETAL: No joint pain or swelling; No muscle, back, or extremity pain    PHYSICAL EXAM:  Appearance: NAD. VS past 24 hrs -as above   HEENT:   Moist oral mucosa. Conjunctiva clear b/l.   Neck : supple  Respiratory: Lungs CTAB.  Gastrointestinal:  Soft, nontender. No rebound. No rigidity. BS present	  Cardiovascular: RRR ,S1S2 present  Neurologic: Non-focal. Moving all extremities.  Extremities: No edema. No erythema. No calf tenderness.  Skin: No rashes, No ecchymoses, No cyanosis.	  wounds ,skin lesions-See skin assesment flow sheet   LABS:                        11.1   7.82  )-----------( 225      ( 02 Nov 2020 05:42 )             34.2     11-02    143  |  109<H>  |  17  ----------------------------<  175<H>  3.4<L>   |  26  |  0.95    Ca    9.1      02 Nov 2020 05:42  Phos  3.5     11-02  Mg     2.3     11-02    TPro  7.1  /  Alb  2.8<L>  /  TBili  0.6  /  DBili  x   /  AST  39<H>  /  ALT  34  /  AlkPhos  74  11-01    CAPILLARY BLOOD GLUCOSE      POCT Blood Glucose.: 165 mg/dL (02 Nov 2020 05:24)  POCT Blood Glucose.: 163 mg/dL (02 Nov 2020 00:19)  POCT Blood Glucose.: 149 mg/dL (01 Nov 2020 17:43)  POCT Blood Glucose.: 169 mg/dL (01 Nov 2020 12:03)          Culture - Sputum (collected 31 Oct 2020 01:25)  Source: .Sputum Sputum  Gram Stain (31 Oct 2020 05:02):    Few Squamous epithelial cells per low power field    Few polymorphonuclear leukocytes per low power field    Rare Yeast like cells per oil power field    Rare Gram Negative Rods per oil power field  Preliminary Report (01 Nov 2020 19:48):    Mixed gram negative rods Culture includes    Few Pseudomonas aeruginosa    Normal Respiratory Elvira present    Culture - Urine (collected 30 Oct 2020 13:18)  Source: .Urine Clean Catch (Midstream)  Final Report (01 Nov 2020 19:08):    >100,000 CFU/ml Proteus mirabilis  Organism: Proteus mirabilis (01 Nov 2020 19:08)  Organism: Proteus mirabilis (01 Nov 2020 19:08)    Culture - Blood (collected 30 Oct 2020 13:18)  Source: .Blood Blood-Peripheral  Preliminary Report (31 Oct 2020 14:00):    No growth to date.    Culture - Blood (collected 30 Oct 2020 13:18)  Source: .Blood Blood-Peripheral  Preliminary Report (31 Oct 2020 14:00):    No growth to date.      RADIOLOGY & ADDITIONAL TESTS:   reviewed elctronically  ASSESSMENT/PLAN: 	     PROGRESS NOTE  Patient is a 74y old  Female who presents with a chief complaint of SOB (01 Nov 2020 21:20)  Chart and available morning labs /imaging are reviewed electronically , urgent issues addressed . More information  is being added upon completion of rounds , when more information is collected and management discussed with consultants , medical staff and social service/case management on the floor   LATE ENTRY- patient was seen and examined earlier today . Plan of care was discussed with med staff and unit coordinator .   OVERNIGHT   No new issues reported by medical staff . All above noted Patient is resting in a bed comfortably ,awake  No distress noted   HPI:  75 yo Female sent from [facility] with respiratory distress. No report of fever or hypoxia. Patient trached/vented ,s/p peg  and non-verbal, unable to contribute to history. Transferred to [place] from [place] ED for admission Admitted for septic workup and evaluation ,send blood and urine cx,serial lactate levels,monitor vitals closely hydration,monitor urine output and renal profile, iv abx initiated -s/p vanco and zosyn . BP stable upon [place] ED arrival Med hx is significant for Advanced dementia ,s/p  Aphasic stroke    Constipation  ,COVID-19   ,CVA (cerebral vascular accident)  ,Dementia of frontal lobe type  ,Diabetes mellitus  ,DM (diabetes mellitus)  ,GERD (gastroesophageal reflux disease)    HTN (hypertension)  ,Hypertension  ,Pneumonia  ,Quadriplegia  ,Respiratory failure ,s/p tracheostomy and peg .Found to have RYAN ,hypernatremia and lactate elevated Admit for iv hydration,monitor renal profile and urine output,nutritionist consult,prealbumin level,serial bmp,nutritional supplements, Palliative care consult requested ,to discuss advance directives and complete MOLST  (30 Oct 2020 07:14)    PAST MEDICAL & SURGICAL HISTORY:  Pneumonia    Quadriplegia    COVID-19 virus detected    Advanced dementia    DM (diabetes mellitus)    HTN (hypertension)    CVA (cerebral vascular accident)    Respiratory failure    Constipation    GERD (gastroesophageal reflux disease)    Hypertension    Respiratory failure    Diabetes mellitus    Aphasic stroke    Dementia of frontal lobe type    Feeding by G-tube    Tracheostomy tube present    Tracheostomy in place    Gastrostomy in place    Hx of appendectomy        MEDICATIONS  (STANDING):  ALBUTerol    90 MICROgram(s) HFA Inhaler 4 Puff(s) Inhalation every 6 hours  aspirin  chewable 81 milliGRAM(s) Oral daily  calcium carbonate 1250 mG  + Vitamin D (OsCal 500 + D) 1 Tablet(s) Oral two times a day  chlorhexidine 2% Cloths 1 Application(s) Topical daily  dextrose 5%. 1000 milliLiter(s) (50 mL/Hr) IV Continuous <Continuous>  enoxaparin Injectable 40 milliGRAM(s) SubCutaneous daily  famotidine    Tablet 20 milliGRAM(s) Oral daily  fentaNYL   Patch  12 MICROgram(s)/Hr 1 Patch Transdermal every 72 hours  insulin lispro (ADMELOG) corrective regimen sliding scale   SubCutaneous every 6 hours  ipratropium 17 MICROgram(s) HFA Inhaler 4 Puff(s) Inhalation every 6 hours  lactobacillus acidophilus 1 Tablet(s) Oral two times a day  piperacillin/tazobactam IVPB.. 3.375 Gram(s) IV Intermittent every 8 hours  potassium chloride   Powder 20 milliEquivalent(s) Oral every 2 hours  psyllium Powder 1 Packet(s) Oral at bedtime  saline laxative (FLEET) Rectal Enema 1 Enema Rectal at bedtime  sucralfate 1 Gram(s) Oral two times a day  tobramycin for Nebulization 300 milliGRAM(s) Inhalation every 12 hours    MEDICATIONS  (PRN):  atropine 1% Ophthalmic Solution for SubLingual Use 1 Drop(s) SubLingual three times a day PRN salicary secretion  dextrose 40% Gel 15 Gram(s) Oral once PRN Blood Glucose LESS THAN 70 milliGRAM(s)/deciliter  glucagon  Injectable 1 milliGRAM(s) IntraMuscular once PRN Glucose LESS THAN 70 milligrams/deciliter  hydrALAZINE Injectable 10 milliGRAM(s) IV Push every 4 hours PRN SBP > 150      OBJECTIVE    T(C): 37 (11-02-20 @ 04:00), Max: 37.2 (11-01-20 @ 23:17)  HR: 92 (11-02-20 @ 08:18) (63 - 108)  BP: 128/61 (11-02-20 @ 07:00) (89/50 - 205/103)  RR: 14 (11-02-20 @ 07:00) (14 - 32)  SpO2: 100% (11-02-20 @ 08:18) (89% - 100%)  Wt(kg): --  I&O's Summary    01 Nov 2020 07:01  -  02 Nov 2020 07:00  --------------------------------------------------------  IN: 3015 mL / OUT: 1850 mL / NET: 1165 mL      NOTE In accordance with  [facility] policy ICU final medical management decisions/MD orders are  determined/done only by ICU Intensivists     REVIEW OF SYSTEMS:  unobtainable due to tracheostomy status     PHYSICAL EXAM:  Appearance: NAD. VS past 24 hrs -as above   HEENT:   Moist oral mucosa. Conjunctiva clear b/l.   Neck : supple s/p trach   Respiratory: Lungs CTAB.  Gastrointestinal:  Soft, nontender. No rebound. No rigidity. BS present	peg  Cardiovascular: RRR ,S1S2 present  Neurologic: Non-focal. Moving all extremities.  Extremities: No edema. No erythema. No calf tenderness.  Skin: No rashes, No ecchymoses, No cyanosis.	  wounds ,skin lesions-See skin assesment flow sheet   LABS:                        11.1   7.82  )-----------( 225      ( 02 Nov 2020 05:42 )             34.2     11-02    143  |  109<H>  |  17  ----------------------------<  175<H>  3.4<L>   |  26  |  0.95    Ca    9.1      02 Nov 2020 05:42  Phos  3.5     11-02  Mg     2.3     11-02    TPro  7.1  /  Alb  2.8<L>  /  TBili  0.6  /  DBili  x   /  AST  39<H>  /  ALT  34  /  AlkPhos  74  11-01    CAPILLARY BLOOD GLUCOSE      POCT Blood Glucose.: 165 mg/dL (02 Nov 2020 05:24)  POCT Blood Glucose.: 163 mg/dL (02 Nov 2020 00:19)  POCT Blood Glucose.: 149 mg/dL (01 Nov 2020 17:43)  POCT Blood Glucose.: 169 mg/dL (01 Nov 2020 12:03)          Culture - Sputum (collected 31 Oct 2020 01:25)  Source: .Sputum Sputum  Gram Stain (31 Oct 2020 05:02):    Few Squamous epithelial cells per low power field    Few polymorphonuclear leukocytes per low power field    Rare Yeast like cells per oil power field    Rare Gram Negative Rods per oil power field  Preliminary Report (01 Nov 2020 19:48):    Mixed gram negative rods Culture includes    Few Pseudomonas aeruginosa    Normal Respiratory Elvira present    Culture - Urine (collected 30 Oct 2020 13:18)  Source: .Urine Clean Catch (Midstream)  Final Report (01 Nov 2020 19:08):    >100,000 CFU/ml Proteus mirabilis  Organism: Proteus mirabilis (01 Nov 2020 19:08)  Organism: Proteus mirabilis (01 Nov 2020 19:08)    Culture - Blood (collected 30 Oct 2020 13:18)  Source: .Blood Blood-Peripheral  Preliminary Report (31 Oct 2020 14:00):    No growth to date.    Culture - Blood (collected 30 Oct 2020 13:18)  Source: .Blood Blood-Peripheral  Preliminary Report (31 Oct 2020 14:00):    No growth to date.      RADIOLOGY & ADDITIONAL TESTS:< from: Xray Chest 1 View- PORTABLE-Urgent (Xray Chest 1 View- PORTABLE-Urgent .) (10.31.20 @ 23:14) >  EXAM:  XR CHEST PORTABLE URGENT 1V                            PROCEDURE DATE:  10/31/2020          INTERPRETATION:  Clinical Information: CHF    Technique: AP chest image.    Comparison: 05/08/2020    Findings/  Impression: The heart is unremarkable. The lungs are clear. Bones are unremarkable for age.  Chronically patulous esophagus.    < end of copied text >     reviewed elctronically  ASSESSMENT/PLAN: 	  25minutes spent on this visit, 50% visit time spent in care co-ordination with other attendings and counselling patient  I have discussed care plan with patient and HCP,expressed understanding of problems treatment and their effect and side effects, alternatives in detail,I have asked if they have any questions and concerns and appropriately addressed them to best of my ability

## 2020-11-02 NOTE — PROGRESS NOTE ADULT - ASSESSMENT
73 yo Female sent from [facility] with respiratory distress. No report of fever or hypoxia. Patient trached/vented ,s/p peg  and non-verbal, unable to contribute to history. Transferred to [place] from [place] ED for admission Admitted for septic workup and evaluation ,send blood and urine cx,serial lactate levels,monitor vitals closely hydration,monitor urine output and renal profile, iv abx initiated -s/p vanco and zosyn . BP stable upon [place] ED arrival Med hx is significant for Advanced dementia ,s/p  Aphasic stroke    Constipation  ,COVID-19   ,CVA (cerebral vascular accident)  ,Dementia of frontal lobe type  ,Diabetes mellitus  ,DM (diabetes mellitus)  ,GERD (gastroesophageal reflux disease)    HTN (hypertension)  ,Hypertension  ,Pneumonia  ,Quadriplegia  ,Respiratory failure s/p tracheostomy and peg .Found to have RYAN ,hypernatremia and lactate elevated Admit for iv hydration,monitor renal profile and urine output,nutritionist consult,prealbumin level,serial bmp,nutritional supplements Palliative care consult requested ,to discuss advance directives and complete MOLST

## 2020-11-02 NOTE — PROGRESS NOTE ADULT - SUBJECTIVE AND OBJECTIVE BOX
[facility] DIVISION of INFECTIOUS DISEASE  Arturo Olivas MD PhD, Haylee Umanzor MD, Andressa Brantley MD, Billy Valenzuela MD  and providing coverage with Alexa Canas MD and Eusebio Chairez MD  Providing Infectious Disease Consultations at [facility], [facility]'s    Office# [phone] to schedule follow up appointments  Answering Service for urgent calls or New Consults [phone]  Cell# to text for urgent issues Arturo Olivas [phone]     infectious diseases progress note:    BREA BECKHAM is a 74y y. o. Female patient    No concerning overnight events    Allergies    codeine (Hives)    Intolerances        ANTIBIOTICS/RELEVANT:  antimicrobials  piperacillin/tazobactam IVPB.. 3.375 Gram(s) IV Intermittent every 8 hours  tobramycin for Nebulization 300 milliGRAM(s) Inhalation every 12 hours    immunologic:    OTHER:  ALBUTerol    90 MICROgram(s) HFA Inhaler 4 Puff(s) Inhalation every 6 hours  aspirin  chewable 81 milliGRAM(s) Oral daily  atropine 1% Ophthalmic Solution for SubLingual Use 1 Drop(s) SubLingual three times a day PRN  calcium carbonate 1250 mG  + Vitamin D (OsCal 500 + D) 1 Tablet(s) Oral two times a day  chlorhexidine 2% Cloths 1 Application(s) Topical daily  dextrose 40% Gel 15 Gram(s) Oral once PRN  dextrose 5%. 1000 milliLiter(s) IV Continuous <Continuous>  enoxaparin Injectable 40 milliGRAM(s) SubCutaneous daily  famotidine    Tablet 20 milliGRAM(s) Oral daily  fentaNYL   Patch  12 MICROgram(s)/Hr 1 Patch Transdermal every 72 hours  glucagon  Injectable 1 milliGRAM(s) IntraMuscular once PRN  hydrALAZINE Injectable 10 milliGRAM(s) IV Push every 4 hours PRN  insulin lispro (ADMELOG) corrective regimen sliding scale   SubCutaneous every 6 hours  ipratropium 17 MICROgram(s) HFA Inhaler 4 Puff(s) Inhalation every 6 hours  lactobacillus acidophilus 1 Tablet(s) Oral two times a day  potassium chloride   Powder 20 milliEquivalent(s) Oral every 2 hours  psyllium Powder 1 Packet(s) Oral at bedtime  saline laxative (FLEET) Rectal Enema 1 Enema Rectal at bedtime  sucralfate 1 Gram(s) Oral two times a day      Objective:  Vital Signs Last 24 Hrs  T(C): 37 (02 Nov 2020 04:00), Max: 37.2 (01 Nov 2020 23:17)  T(F): 98.6 (02 Nov 2020 04:00), Max: 98.9 (01 Nov 2020 23:17)  HR: 92 (02 Nov 2020 08:18) (63 - 108)  BP: 128/61 (02 Nov 2020 07:00) (89/50 - 205/103)  BP(mean): 88 (02 Nov 2020 07:00) (68 - 142)  RR: 14 (02 Nov 2020 07:00) (14 - 32)  SpO2: 100% (02 Nov 2020 08:18) (89% - 100%)    T(C): 37 (11-02-20 @ 04:00), Max: 37.2 (11-01-20 @ 23:17)  T(C): 37 (11-02-20 @ 04:00), Max: 37.2 (11-01-20 @ 23:17)  T(C): 37 (11-02-20 @ 04:00), Max: 38.6 (10-29-20 @ 22:14)    PHYSICAL EXAM: intubated, eyes open  HEENT: NC atraumatic  Neck: supple  Respiratory: no accessory muscle use, breathing comfortably  Cardiovascular: distant  Gastrointestinal: normal appearing, nondistended  Extremities: no clubbing, no cyanosis,      LABS:                          11.1   7.82  )-----------( 225      ( 02 Nov 2020 05:42 )             34.2       7.82 11-02 @ 05:42  11.74 11-01 @ 05:46  6.31 10-31 @ 05:07  10.22 10-30 @ 06:48  20.99 10-29 @ 22:39      11-02    143  |  109<H>  |  17  ----------------------------<  175<H>  3.4<L>   |  26  |  0.95    Ca    9.1      02 Nov 2020 05:42  Phos  3.5     11-02  Mg     2.3     11-02    TPro  7.1  /  Alb  2.8<L>  /  TBili  0.6  /  DBili  x   /  AST  39<H>  /  ALT  34  /  AlkPhos  74  11-01      Creatinine, Serum: 0.95 mg/dL (11-02-20 @ 05:42)  Creatinine, Serum: 1.00 mg/dL (11-01-20 @ 05:46)  Creatinine, Serum: 1.00 mg/dL (10-31-20 @ 05:07)  Creatinine, Serum: 1.40 mg/dL (10-30-20 @ 06:48)  Creatinine, Serum: 2.29 mg/dL (10-29-20 @ 22:39)                INFLAMMATORY MARKERS  Auto Neutrophil #: 4.83 K/uL (11-02-20 @ 05:42)  Auto Lymphocyte #: 2.06 K/uL (11-02-20 @ 05:42)  Auto Neutrophil #: 8.21 K/uL (11-01-20 @ 05:46)  Auto Lymphocyte #: 2.20 K/uL (11-01-20 @ 05:46)  Auto Neutrophil #: 3.62 K/uL (10-31-20 @ 05:07)  Auto Lymphocyte #: 1.88 K/uL (10-31-20 @ 05:07)  Auto Neutrophil #: 16.96 K/uL (10-29-20 @ 22:39)  Auto Lymphocyte #: 2.29 K/uL (10-29-20 @ 22:39)    Lactate, Blood: 2.8 mmol/L (10-31-20 @ 05:07)  Lactate, Blood: 3.5 mmol/L (10-30-20 @ 14:23)  Lactate, Blood: 3.8 mmol/L (10-30-20 @ 06:49)  Lactate, Blood: 7.2 mmol/L (10-30-20 @ 00:22)  Lactate, Blood: 8.7 mmol/L (10-29-20 @ 22:39)    Auto Eosinophil #: 0.12 K/uL (11-02-20 @ 05:42)  Auto Eosinophil #: 0.08 K/uL (11-01-20 @ 05:46)  Auto Eosinophil #: 0.17 K/uL (10-31-20 @ 05:07)  Auto Eosinophil #: 0.01 K/uL (10-29-20 @ 22:39)          Troponin I, Serum: .066 ng/mL (10-30-20 @ 14:23)  Troponin I, Serum: .086 ng/mL (10-30-20 @ 06:48)                Activated Partial Thromboplastin Time: 43.7 sec (10-29-20 @ 22:39)  INR: 1.08 ratio (10-29-20 @ 22:39)          MICROBIOLOGY:    Culture - Sputum . (10.31.20 @ 01:25)    Gram Stain:   Few Squamous epithelial cells per low power field  Few polymorphonuclear leukocytes per low power field  Rare Yeast like cells per oil power field  Rare Gram Negative Rods per oil power field    Specimen Source: .Sputum Sputum    Culture Results:   Mixed gram negative rods Culture includes  Few Pseudomonas aeruginosa  Normal Respiratory Elvira present    Culture - Urine (10.30.20 @ 13:18)    -  Ceftriaxone: S <=1 Enterobacter, Citrobacter, and Serratia may develop resistance during prolonged therapy    -  Ciprofloxacin: S <=0.25    -  Ertapenem: S <=0.5    -  Gentamicin: S <=2    -  Levofloxacin: S <=0.5    -  Meropenem: S <=1    -  Nitrofurantoin: R >64 Should not be used to treat pyelonephritis    -  Piperacillin/Tazobactam: S <=8    -  Tobramycin: S <=2    -  Trimethoprim/Sulfamethoxazole: S 2/38    -  Amikacin: S <=16    -  Amoxicillin/Clavulanic Acid: S <=8/4    -  Ampicillin: S <=8 These ampicillin results predict results for amoxicillin    -  Ampicillin/Sulbactam: S <=4/2 Enterobacter, Citrobacter, and Serratia may develop resistance during prolonged therapy (3-4 days)    -  Aztreonam: S <=4    -  Cefazolin: S <=2 ([id number]) For uncomplicated UTI with K. pneumoniae, E. coli, or P. mirablis: PRATIK <=16 is sensitive and PRATIK >=32 is resistant. This also predicts results for oral agents cefaclor, cefdinir, cefpodoxime, cefprozil, cefuroxime axetil, cephalexin and locarbef for uncomplicated UTI. Note that some isolates may be susceptible to these agents while testing resistant to cefazolin.    -  Cefepime: S <=2    -  Cefoxitin: S <=8    Specimen Source: .Urine Clean Catch (Midstream)    Culture Results:   >100,000 CFU/ml Proteus mirabilis    Organism Identification: Proteus mirabilis    Organism: Proteus mirabilis    Method Type: PRATIK        RADIOLOGY & ADDITIONAL STUDIES:

## 2020-11-02 NOTE — PROGRESS NOTE ADULT - ASSESSMENT
cont rx   cont rx      [facility]  DOA 10/30/2020 DR ILIANA KEMP       REVIEW OF SYMPTOMS      Able to give ROS  NO     PHYSICAL EXAM    HEENT Unremarkable PERRLA atraumatic   RESP Fair air entry EXP prolonged    Harsh breath sound Resp distres mild   CARDIAC S1 S2 No S3     NO JVD    ABDOMEN SOFT BS PRESENT NOT DISTENDED No hepatosplenomegaly PEDAL EDEMA present No calf tenderness  NO rash     PT DATA/BEST PRACTICE  ALLERGY        codeine       WT                             10/30/2020 56   BMI                                  10/30/2020 22      ADVANCED DIRECTIVE     HEAD OF BED ELEVATION Yes      DVT PROPHYLAXIS.   LVNX 30 (10/30) --> lvnx 40 (10/31)   DIET. glucerna 1.5 1000 (10/3)   FREE WATER 250.4 (11/1) (Dr NICHOLAS)                                                                 SQUIRES PROPHYLAXIS. FAMOTIDINE 20 (10/30)  INFECTION PPLX          chlorhexidine 4% (10/30)                                             MICROBIO.   COVID pcr 10/30/2020 pcr negv   COVID 10/30 igg 249 posv  Ua 10/29/2020  1010 l estr mod w 11-25 r 6-10   URINE C 10/30 100k proteus     BLOD c 10/30 bc negv   SPOUTUM 10/31 ssp pseudomonas           LABS.   W 10/29-10/30-10/31-11/1/2020 w 20.9-10.2 -6.3-11.7  la 10/29-10/30-10/31 la 8.7-3.8 -2.8  Tr 10/30/2020 Tr .086   BNP 10/30/2020 1346   Hb 10/29-10/30-10/31/2020 Hb 13.2-11.6-10   inr 10/29 inr 1   Na 10/29-10/30-10/31 -11/1 Na 146-150 -149 - 147   Cr 10/29-10/30-10/31-11/1-11/2 Cr 2.2-1.4 -1-1            PATIENT SUMMARY.   74 yr female hx of dm, quadaplegic,  resp failure on chronic vent , htn, peg, CVA, advanced dementia, recent admission for +++COVID , d/c to vent facility with neg covid, now with resp distress, sepsis, hypotensive  , most likely UTI  sepsis   Pulm consulted 10/30      PROBLEM/ASSESSMENT/RECOMMENDATIONS.  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

## 2020-11-03 LAB
ALBUMIN SERPL ELPH-MCNC: 2.8 G/DL — LOW (ref 3.3–5)
ALP SERPL-CCNC: 79 U/L — SIGNIFICANT CHANGE UP (ref 40–120)
ALT FLD-CCNC: 31 U/L — SIGNIFICANT CHANGE UP (ref 12–78)
ANION GAP SERPL CALC-SCNC: 6 MMOL/L — SIGNIFICANT CHANGE UP (ref 5–17)
AST SERPL-CCNC: 26 U/L — SIGNIFICANT CHANGE UP (ref 15–37)
BASOPHILS # BLD AUTO: 0.03 K/UL — SIGNIFICANT CHANGE UP (ref 0–0.2)
BASOPHILS NFR BLD AUTO: 0.4 % — SIGNIFICANT CHANGE UP (ref 0–2)
BILIRUB SERPL-MCNC: 0.4 MG/DL — SIGNIFICANT CHANGE UP (ref 0.2–1.2)
BUN SERPL-MCNC: 16 MG/DL — SIGNIFICANT CHANGE UP (ref 7–23)
CALCIUM SERPL-MCNC: 9.6 MG/DL — SIGNIFICANT CHANGE UP (ref 8.5–10.1)
CHLORIDE SERPL-SCNC: 113 MMOL/L — HIGH (ref 96–108)
CO2 SERPL-SCNC: 28 MMOL/L — SIGNIFICANT CHANGE UP (ref 22–31)
CREAT SERPL-MCNC: 1 MG/DL — SIGNIFICANT CHANGE UP (ref 0.5–1.3)
EOSINOPHIL # BLD AUTO: 0.16 K/UL — SIGNIFICANT CHANGE UP (ref 0–0.5)
EOSINOPHIL NFR BLD AUTO: 2.2 % — SIGNIFICANT CHANGE UP (ref 0–6)
GLUCOSE SERPL-MCNC: 155 MG/DL — HIGH (ref 70–99)
HCT VFR BLD CALC: 36.5 % — SIGNIFICANT CHANGE UP (ref 34.5–45)
HGB BLD-MCNC: 11.4 G/DL — LOW (ref 11.5–15.5)
IMM GRANULOCYTES NFR BLD AUTO: 1.2 % — SIGNIFICANT CHANGE UP (ref 0–1.5)
LYMPHOCYTES # BLD AUTO: 2.02 K/UL — SIGNIFICANT CHANGE UP (ref 1–3.3)
LYMPHOCYTES # BLD AUTO: 28 % — SIGNIFICANT CHANGE UP (ref 13–44)
MAGNESIUM SERPL-MCNC: 2.3 MG/DL — SIGNIFICANT CHANGE UP (ref 1.6–2.6)
MCHC RBC-ENTMCNC: 27.5 PG — SIGNIFICANT CHANGE UP (ref 27–34)
MCHC RBC-ENTMCNC: 31.2 GM/DL — LOW (ref 32–36)
MCV RBC AUTO: 88 FL — SIGNIFICANT CHANGE UP (ref 80–100)
MONOCYTES # BLD AUTO: 0.68 K/UL — SIGNIFICANT CHANGE UP (ref 0–0.9)
MONOCYTES NFR BLD AUTO: 9.4 % — SIGNIFICANT CHANGE UP (ref 2–14)
NEUTROPHILS # BLD AUTO: 4.24 K/UL — SIGNIFICANT CHANGE UP (ref 1.8–7.4)
NEUTROPHILS NFR BLD AUTO: 58.8 % — SIGNIFICANT CHANGE UP (ref 43–77)
NRBC # BLD: 0 /100 WBCS — SIGNIFICANT CHANGE UP (ref 0–0)
PHOSPHATE SERPL-MCNC: 5.1 MG/DL — HIGH (ref 2.5–4.5)
PLATELET # BLD AUTO: 212 K/UL — SIGNIFICANT CHANGE UP (ref 150–400)
POTASSIUM SERPL-MCNC: 4.2 MMOL/L — SIGNIFICANT CHANGE UP (ref 3.5–5.3)
POTASSIUM SERPL-SCNC: 4.2 MMOL/L — SIGNIFICANT CHANGE UP (ref 3.5–5.3)
PROT SERPL-MCNC: 7 G/DL — SIGNIFICANT CHANGE UP (ref 6–8.3)
RBC # BLD: 4.15 M/UL — SIGNIFICANT CHANGE UP (ref 3.8–5.2)
RBC # FLD: 14.9 % — HIGH (ref 10.3–14.5)
SODIUM SERPL-SCNC: 147 MMOL/L — HIGH (ref 135–145)
WBC # BLD: 7.22 K/UL — SIGNIFICANT CHANGE UP (ref 3.8–10.5)
WBC # FLD AUTO: 7.22 K/UL — SIGNIFICANT CHANGE UP (ref 3.8–10.5)

## 2020-11-03 PROCEDURE — 99233 SBSQ HOSP IP/OBS HIGH 50: CPT | Mod: GC

## 2020-11-03 RX ADMIN — ENOXAPARIN SODIUM 40 MILLIGRAM(S): 100 INJECTION SUBCUTANEOUS at 13:02

## 2020-11-03 RX ADMIN — Medication 1 ENEMA: at 21:20

## 2020-11-03 RX ADMIN — FENTANYL CITRATE 1 PATCH: 50 INJECTION INTRAVENOUS at 20:05

## 2020-11-03 RX ADMIN — ALBUTEROL 4 PUFF(S): 90 AEROSOL, METERED ORAL at 07:55

## 2020-11-03 RX ADMIN — FENTANYL CITRATE 1 PATCH: 50 INJECTION INTRAVENOUS at 07:33

## 2020-11-03 RX ADMIN — ALBUTEROL 4 PUFF(S): 90 AEROSOL, METERED ORAL at 02:22

## 2020-11-03 RX ADMIN — Medication 2: at 23:41

## 2020-11-03 RX ADMIN — Medication 81 MILLIGRAM(S): at 13:02

## 2020-11-03 RX ADMIN — ALBUTEROL 4 PUFF(S): 90 AEROSOL, METERED ORAL at 13:42

## 2020-11-03 RX ADMIN — Medication 4 PUFF(S): at 13:42

## 2020-11-03 RX ADMIN — Medication 4 PUFF(S): at 20:08

## 2020-11-03 RX ADMIN — Medication 4 PUFF(S): at 07:55

## 2020-11-03 RX ADMIN — Medication 4 PUFF(S): at 02:22

## 2020-11-03 RX ADMIN — Medication 2: at 05:03

## 2020-11-03 RX ADMIN — ALBUTEROL 4 PUFF(S): 90 AEROSOL, METERED ORAL at 20:08

## 2020-11-03 RX ADMIN — CHLORHEXIDINE GLUCONATE 1 APPLICATION(S): 213 SOLUTION TOPICAL at 13:03

## 2020-11-03 RX ADMIN — FAMOTIDINE 20 MILLIGRAM(S): 10 INJECTION INTRAVENOUS at 13:02

## 2020-11-03 RX ADMIN — CEFTRIAXONE 100 MILLIGRAM(S): 500 INJECTION, POWDER, FOR SOLUTION INTRAMUSCULAR; INTRAVENOUS at 13:02

## 2020-11-03 NOTE — PROGRESS NOTE ADULT - ASSESSMENT
cont rx   cont rx      CHAPINCITO GUIDRY  DOA 10/30/2020 DR ILIANA KEMP       REVIEW OF SYMPTOMS      Able to give ROS  NO     PHYSICAL EXAM    HEENT Unremarkable PERRLA atraumatic   RESP Fair air entry EXP prolonged    Harsh breath sound Resp distres mild   CARDIAC S1 S2 No S3     NO JVD    ABDOMEN SOFT BS PRESENT NOT DISTENDED No hepatosplenomegaly PEDAL EDEMA present No calf tenderness  NO rash     PT DATA/BEST PRACTICE  ALLERGY        codeine       WT                             10/30/2020 56   BMI                                  10/30/2020 22      ADVANCED DIRECTIVE     HEAD OF BED ELEVATION Yes      DVT PROPHYLAXIS.   LVNX 30 (10/30) --> lvnx 40 (10/31)   DIET. glucerna 1.5 1000 (10/3)   FREE WATER 250.4 (11/1) (Dr NICHOLAS)                                                                 SQUIRES PROPHYLAXIS. FAMOTIDINE 20 (10/30)  INFECTION PPLX          chlorhexidine 4% (10/30)                                           OXYGENATION.         VITALS.   11/3/2020 afeb 65 140/60   11/3/2020 ac 14/400/5/.3      PATIENT SUMMARY.   74 yr female hx of dm, quadaplegic,  resp failure on chronic vent , htn, peg, CVA, advanced dementia, recent admission for +++COVID , d/c to vent facility with neg covid, now with resp distress, sepsis, hypotensive  , most likely UTI  sepsis   Pulm consulted 10/30      PROBLEM/ASSESSMENT/RECOMMENDATIONS.  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

## 2020-11-03 NOTE — PROGRESS NOTE ADULT - ASSESSMENT
75 yo F PMH frontotemporal dementia, aphasic stroke, chronic trach & peg, bed bound, insulin dependent DM, covid in march, HTN, recent PNA admitted for severe sepsis 2/2 uti.    Neuro: no acute issues. c/w transdermal fentanyl patch  Cardio: hemodynamically stable. TTE shows EF 60-65%, Grade 1 LV diastolic dysfunction, MR, TR. ASA 81 daily through peg tube. Strict Is/Os  Pulm: continue with mechanical ventilation. CXR clear. continue albuterol & ipatropium 4 puffs q6 for secretions, tobramycin discontinued. 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  GI: Glucerna feeds through peg. 250 cc free water q6 for hypernatremia. c/w fleet enema, famotidine, psyllium, sucralfate, and lactobillus  Renal: prerenal RYAN 2/2 sepsis with lactic acidosis improving with fluid administration. Will c/w IVF, lactate trending down 3.5 --> 2.8. Chronic vaughn for urinary retention. Hypokalemia - K repleted this AM, will f/u BMP   ID: Urosepsis - Ucx grew proteus resistant to nitrofurantoin, coverage switched from zosyn to ceftriaxone 1 g daily for a total of a 14 day course (last day 11/12), blood cultures NGTD, sputum culture with few psuedomonas, likely colonization  Endo: ISS for insulin dependent diabetes, HbA1C 6.6  heme: 40 mg SQ LVX DVT ppx 75 yo F PMH frontotemporal dementia, aphasic stroke, chronic trach & peg, bed bound, insulin dependent DM, covid in march, HTN, recent PNA admitted for severe sepsis 2/2 uti.    Neuro: no acute issues. c/w transdermal fentanyl patch  Cardio: hemodynamically stable. TTE shows EF 60-65%, Grade 1 LV diastolic dysfunction, MR, TR. ASA 81 daily through peg tube. Strict Is/Os  Pulm: continue with mechanical ventilation. CXR clear. continue albuterol & ipatropium 4 puffs q6 for secretions, tobramycin discontinued. 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  GI: PEG tube occlusions resolved. Glucerna feeds through peg. 250 cc free water q6 for hypernatremia. c/w fleet enema, famotidine, psyllium, sucralfate, and lactobillus  Renal: prerenal RYAN 2/2 sepsis with lactic acidosis - resolved with fluid administration. Chronic vaughn for urinary retention. Hypernatremia - likely 2/2 holding of free water due to PEG occlusion, cc free water q6 restarted  ID: Urosepsis - Ucx grew proteus resistant to nitrofurantoin, coverage switched from zosyn to ceftriaxone 1 g daily for a total of a 14 day course (last day 11/12), blood cultures NGTD, sputum culture with few psuedomonas, likely colonization  Endo: ISS for insulin dependent diabetes, HbA1C 6.6  heme: 40 mg SQ LVX DVT ppx

## 2020-11-03 NOTE — PROGRESS NOTE ADULT - SUBJECTIVE AND OBJECTIVE BOX
PROGRESS NOTE  Patient is a 74y old  Female who presents with a chief complaint of SOB (02 Nov 2020 17:45)  Chart and available morning labs /imaging are reviewed electronically , urgent issues addressed . More information  is being added upon completion of rounds , when more information is collected and management discussed with consultants , medical staff and social service/case management on the floor   LATE ENTRY- patient was seen and examined earlier today . Plan of care was discussed with med staff and unit coordinator .   OVERNIGHT  24 hour events: PEG tube clogged overnight, resolved by nursing in AM. No other acute events. Pt remained afebrile overnight.  HPI:  73 yo Female sent from [facility] with respiratory distress. No report of fever or hypoxia. Patient trached/vented ,s/p peg  and non-verbal, unable to contribute to history. Transferred to [facility] from [facility] for admission Admitted for septic workup and evaluation ,send blood and urine cx,serial lactate levels,monitor vitals closely hydration,monitor urine output and renal profile, iv abx initiated -s/p vanco and zosyn . BP stable upon [facility] ED arrival Med hx is significant for Advanced dementia ,s/p  Aphasic stroke    Constipation  ,COVID-19   ,CVA (cerebral vascular accident)  ,Dementia of frontal lobe type  ,Diabetes mellitus  ,DM (diabetes mellitus)  ,GERD (gastroesophageal reflux disease)    HTN (hypertension)  ,Hypertension  ,Pneumonia  ,Quadriplegia  ,Respiratory failure ,s/p tracheostomy and peg .Found to have RYAN ,hypernatremia and lactate elevated Admit for iv hydration,monitor renal profile and urine output,nutritionist consult,prealbumin level,serial bmp,nutritional supplements, Palliative care consult requested ,to discuss advance directives and complete MOLST  (30 Oct 2020 07:14)    PAST MEDICAL & SURGICAL HISTORY:  Pneumonia    Quadriplegia    COVID-19 virus detected    Advanced dementia    DM (diabetes mellitus)    HTN (hypertension)    CVA (cerebral vascular accident)    Respiratory failure    Constipation    GERD (gastroesophageal reflux disease)    Hypertension    Respiratory failure    Diabetes mellitus    Aphasic stroke    Dementia of frontal lobe type    Feeding by G-tube    Tracheostomy tube present    Tracheostomy in place    Gastrostomy in place    Hx of appendectomy        MEDICATIONS  (STANDING):  ALBUTerol    90 MICROgram(s) HFA Inhaler 4 Puff(s) Inhalation every 6 hours  aspirin  chewable 81 milliGRAM(s) Oral daily  calcium carbonate 1250 mG  + Vitamin D (OsCal 500 + D) 1 Tablet(s) Oral two times a day  cefTRIAXone   IVPB 1000 milliGRAM(s) IV Intermittent every 24 hours  chlorhexidine 2% Cloths 1 Application(s) Topical daily  dextrose 5%. 1000 milliLiter(s) (50 mL/Hr) IV Continuous <Continuous>  enoxaparin Injectable 40 milliGRAM(s) SubCutaneous daily  famotidine    Tablet 20 milliGRAM(s) Oral daily  fentaNYL   Patch  12 MICROgram(s)/Hr 1 Patch Transdermal every 72 hours  insulin lispro (ADMELOG) corrective regimen sliding scale   SubCutaneous every 6 hours  ipratropium 17 MICROgram(s) HFA Inhaler 4 Puff(s) Inhalation every 6 hours  lactobacillus acidophilus 1 Tablet(s) Oral two times a day  psyllium Powder 1 Packet(s) Oral at bedtime  saline laxative (FLEET) Rectal Enema 1 Enema Rectal at bedtime  sucralfate 1 Gram(s) Oral two times a day    MEDICATIONS  (PRN):  atropine 1% Ophthalmic Solution for SubLingual Use 1 Drop(s) SubLingual three times a day PRN salicary secretion  dextrose 40% Gel 15 Gram(s) Oral once PRN Blood Glucose LESS THAN 70 milliGRAM(s)/deciliter  glucagon  Injectable 1 milliGRAM(s) IntraMuscular once PRN Glucose LESS THAN 70 milligrams/deciliter  hydrALAZINE Injectable 10 milliGRAM(s) IV Push every 4 hours PRN SBP > 150      OBJECTIVE    T(C): 36.2 (11-03-20 @ 15:36), Max: 37.2 (11-02-20 @ 20:02)  HR: 66 (11-03-20 @ 16:44) (64 - 95)  BP: 151/66 (11-03-20 @ 07:00) (128/60 - 155/69)  RR: 20 (11-03-20 @ 07:00) (15 - 25)  SpO2: 100% (11-03-20 @ 16:44) (98% - 100%)  Wt(kg): --  I&O's Summary    02 Nov 2020 07:01  -  03 Nov 2020 07:00  --------------------------------------------------------  IN: 1650 mL / OUT: 2040 mL / NET: -390 mL      NOTE In accordance with  [facility] policy ICU final medical management decisions/MD orders are  determined/done only by ICU Intensivists     REVIEW OF SYSTEMS:  unobtainable due to tracheostomy status     PHYSICAL EXAM:  Appearance: NAD. VS past 24 hrs -as above   HEENT:   Moist oral mucosa. Conjunctiva clear b/l.   Neck : supple trach   Respiratory: Lungs CTAB.  Gastrointestinal:  Soft, nontender. No rebound. No rigidity. BS present peg	  Cardiovascular: RRR ,S1S2 present  Neurologic: Non-focal. Moving all extremities.  Extremities: No edema. No erythema. No calf tenderness. contractures   Skin: No rashes, No ecchymoses, No cyanosis.	  wounds ,skin lesions-See skin assesment flow sheet   LABS:                        11.4   7.22  )-----------( 212      ( 03 Nov 2020 05:43 )             36.5     11-03    147<H>  |  113<H>  |  16  ----------------------------<  155<H>  4.2   |  28  |  1.00    Ca    9.6      03 Nov 2020 05:43  Phos  5.1     11-03  Mg     2.3     11-03    TPro  7.0  /  Alb  2.8<L>  /  TBili  0.4  /  DBili  x   /  AST  26  /  ALT  31  /  AlkPhos  79  11-03    CAPILLARY BLOOD GLUCOSE      POCT Blood Glucose.: 131 mg/dL (03 Nov 2020 12:02)  POCT Blood Glucose.: 154 mg/dL (03 Nov 2020 05:02)  POCT Blood Glucose.: 157 mg/dL (02 Nov 2020 23:05)  POCT Blood Glucose.: 152 mg/dL (02 Nov 2020 19:15)          Culture - Sputum (collected 31 Oct 2020 01:25)  Source: .Sputum Sputum  Gram Stain (31 Oct 2020 05:02):    Few Squamous epithelial cells per low power field    Few polymorphonuclear leukocytes per low power field    Rare Yeast like cells per oil power field    Rare Gram Negative Rods per oil power field  Final Report (02 Nov 2020 17:02):    Mixed gram negative rods Culture includes    Few Pseudomonas aeruginosa    Normal Respiratory Elvira present  Organism: Pseudomonas aeruginosa (02 Nov 2020 17:02)  Organism: Pseudomonas aeruginosa (02 Nov 2020 17:02)    Culture - Urine (collected 30 Oct 2020 13:18)  Source: .Urine Clean Catch (Midstream)  Final Report (01 Nov 2020 19:08):    >100,000 CFU/ml Proteus mirabilis  Organism: Proteus mirabilis (01 Nov 2020 19:08)  Organism: Proteus mirabilis (01 Nov 2020 19:08)    Culture - Blood (collected 30 Oct 2020 13:18)  Source: .Blood Blood-Peripheral  Preliminary Report (31 Oct 2020 14:00):    No growth to date.    Culture - Blood (collected 30 Oct 2020 13:18)  Source: .Blood Blood-Peripheral  Preliminary Report (31 Oct 2020 14:00):    No growth to date.      RADIOLOGY & ADDITIONAL TESTS:   reviewed elctronically  ASSESSMENT/PLAN: 	  25minutes spent on this visit, 50% visit time spent in care co-ordination with other attendings and counselling patient  I have discussed care plan with patient and HCP,expressed understanding of problems treatment and their effect and side effects, alternatives in detail,I have asked if they have any questions and concerns and appropriately addressed them to best of my ability

## 2020-11-03 NOTE — PROGRESS NOTE ADULT - SUBJECTIVE AND OBJECTIVE BOX
Patient is a 74y old  Female who presents with a chief complaint of SOB (02 Nov 2020 17:45)    24 hour events: ***    REVIEW OF SYSTEMS  Constitutional: No fever, chills, fatigue  Neuro: No headache, numbness, weakness  Resp: No cough, wheezing, shortness of breath  CVS: No chest pain, palpitations, leg swelling  GI: No abdominal pain, nausea, vomiting, diarrhea   : No dysuria, frequency, incontinence  Skin: No itching, burning, rashes, or lesions   Msk: No joint pain or swelling  Psych: No depression, anxiety, mood swings  Heme: No bleeding    T(F): 98.1 (11-03-20 @ 07:29), Max: 99 (11-02-20 @ 20:02)  HR: 75 (11-03-20 @ 06:00) (70 - 97)  BP: 151/67 (11-03-20 @ 06:00) (123/59 - 155/69)  RR: 18 (11-03-20 @ 06:00) (15 - 25)  SpO2: 100% (11-03-20 @ 06:00) (98% - 100%)  Wt(kg): --    Mode: AC/ CMV (Assist Control/ Continuous Mandatory Ventilation), RR (machine): 14, TV (machine): 400, FiO2: 30, PEEP: 5        I&O's Summary    11-02 @ 07:01  -  11-03 @ 07:00  --------------------------------------------------------  IN: 1650 mL / OUT: 2040 mL / NET: -390 mL      PHYSICAL EXAM  General:   CNS:   HEENT:   Resp:   CVS:   Abd:   Ext:   Skin:     MEDICATIONS  cefTRIAXone   IVPB IV Intermittent    hydrALAZINE Injectable IV Push PRN    dextrose 40% Gel Oral PRN  glucagon  Injectable IntraMuscular PRN  insulin lispro (ADMELOG) corrective regimen sliding scale SubCutaneous    ALBUTerol    90 MICROgram(s) HFA Inhaler Inhalation  ipratropium 17 MICROgram(s) HFA Inhaler Inhalation    fentaNYL   Patch  12 MICROgram(s)/Hr Transdermal      aspirin  chewable Oral  enoxaparin Injectable SubCutaneous    famotidine    Tablet Oral  psyllium Powder Oral  saline laxative (FLEET) Rectal Enema Rectal  sucralfate Oral      calcium carbonate 1250 mG  + Vitamin D (OsCal 500 + D) Oral  dextrose 5%. IV Continuous      atropine 1% Ophthalmic Solution for SubLingual Use SubLingual PRN  chlorhexidine 2% Cloths Topical    lactobacillus acidophilus Oral                          11.4   7.22  )-----------( 212      ( 03 Nov 2020 05:43 )             36.5       11-03    147<H>  |  113<H>  |  16  ----------------------------<  155<H>  4.2   |  28  |  1.00    Ca    9.6      03 Nov 2020 05:43  Phos  5.1     11-03  Mg     2.3     11-03    TPro  7.0  /  Alb  2.8<L>  /  TBili  0.4  /  DBili  x   /  AST  26  /  ALT  31  /  AlkPhos  79  11-03              .Sputum Sputum   Mixed gram negative rods Culture includes  Few Pseudomonas aeruginosa  Normal Respiratory Elvira present   Few Squamous epithelial cells per low power field  Few polymorphonuclear leukocytes per low power field  Rare Yeast like cells per oil power field  Rare Gram Negative Rods per oil power field 10-31 @ 01:25  .Blood Blood-Peripheral   No growth to date. -- 10-30 @ 13:18        Radiology: ***  Bedside lung ultrasound: ***  Bedside ECHO: ***    CENTRAL LINE: Y/N          DATE INSERTED:              REMOVE: Y/N  REID: Y/N                        DATE INSERTED:              REMOVE: Y/N  A-LINE: Y/N                       DATE INSERTED:              REMOVE: Y/N    GLOBAL ISSUE/BEST PRACTICE  Analgesia:   Sedation:   CAM-ICU:   HOB elevation: yes  Stress ulcer prophylaxis:   VTE prophylaxis:   Glycemic control:   Nutrition:     CODE STATUS: ***  [facility] discussion: Y       Patient is a 74y old  Female who presents with a chief complaint of SOB (02 Nov 2020 17:45)    24 hour events: PEG tube clogged overnight. No other acute events. Pt remained afebrile overnight    REVIEW OF SYSTEMS  unable to assess 2/2 mental status    T(F): 98.1 (11-03-20 @ 07:29), Max: 99 (11-02-20 @ 20:02)  HR: 75 (11-03-20 @ 06:00) (70 - 97)  BP: 151/67 (11-03-20 @ 06:00) (123/59 - 155/69)  RR: 18 (11-03-20 @ 06:00) (15 - 25)  SpO2: 100% (11-03-20 @ 06:00) (98% - 100%)  Wt(kg): --    Mode: AC/ CMV (Assist Control/ Continuous Mandatory Ventilation), RR (machine): 14, TV (machine): 400, FiO2: 30, PEEP: 5        I&O's Summary    11-02 @ 07:01  -  11-03 @ 07:00  --------------------------------------------------------  IN: 1650 mL / OUT: 2040 mL / NET: -390 mL      PHYSICAL EXAM  General: frail appearing woman, awake but unresponsive, intubated, trached  CNS: contracted bedbound, does not follow commands, withdraws from painful stimuli, open eyes to name  HEENT: PERRL  Resp: CTABL, trache sight clean, no discharge  CVS: rrr no m/r/g  Abd: soft, nontender, mildly distended. PEG site occluded. No erythema, discharge  Ext: No cyanosis, edema  Skin: warm & perfused    MEDICATIONS  cefTRIAXone   IVPB IV Intermittent  hydrALAZINE Injectable IV Push PRN  dextrose 40% Gel Oral PRN  glucagon  Injectable IntraMuscular PRN  insulin lispro (ADMELOG) corrective regimen sliding scale SubCutaneous  ALBUTerol    90 MICROgram(s) HFA Inhaler Inhalation  ipratropium 17 MICROgram(s) HFA Inhaler Inhalation  fentaNYL   Patch  12 MICROgram(s)/Hr Transdermal  aspirin  chewable Oral  enoxaparin Injectable SubCutaneous  famotidine    Tablet Oral  psyllium Powder Oral  saline laxative (FLEET) Rectal Enema Rectal  sucralfate Oral  calcium carbonate 1250 mG  + Vitamin D (OsCal 500 + D) Oral  dextrose 5%. IV Continuous  atropine 1% Ophthalmic Solution for SubLingual Use SubLingual PRN  chlorhexidine 2% Cloths Topical  lactobacillus acidophilus Oral                          11.4   7.22  )-----------( 212      ( 03 Nov 2020 05:43 )             36.5       11-03    147<H>  |  113<H>  |  16  ----------------------------<  155<H>  4.2   |  28  |  1.00    Ca    9.6      03 Nov 2020 05:43  Phos  5.1     11-03  Mg     2.3     11-03    TPro  7.0  /  Alb  2.8<L>  /  TBili  0.4  /  DBili  x   /  AST  26  /  ALT  31  /  AlkPhos  79  11-03      CAPILLARY BLOOD GLUCOSE      POCT Blood Glucose.: 154 mg/dL (03 Nov 2020 05:02)  POCT Blood Glucose.: 157 mg/dL (02 Nov 2020 23:05)  POCT Blood Glucose.: 152 mg/dL (02 Nov 2020 19:15)      .Sputum Sputum   Mixed gram negative rods Culture includes  Few Pseudomonas aeruginosa  Normal Respiratory Elvira present   Few Squamous epithelial cells per low power field  Few polymorphonuclear leukocytes per low power field  Rare Yeast like cells per oil power field  Rare Gram Negative Rods per oil power field 10-31 @ 01:25  .Blood Blood-Peripheral   No growth to date. -- 10-30 @ 13:18           Radiology: no new imaging    CENTRAL LINE: N           REID: Y                        DATE INSERTED:  10/30/2020               GLOBAL ISSUE/BEST PRACTICE  Analgesia: Y  Sedation: N  HOB elevation: yes  Stress ulcer prophylaxis: Y  VTE prophylaxis: Y  Glycemic control: Y  Nutrition: currently NPO due to clogged peg    CODE STATUS: FULL  [facility] discussion: Y       Patient is a 74y old  Female who presents with a chief complaint of SOB (02 Nov 2020 17:45)    24 hour events: PEG tube clogged overnight, resolved by nursing in AM. No other acute events. Pt remained afebrile overnight    REVIEW OF SYSTEMS  unable to assess 2/2 mental status    T(F): 98.1 (11-03-20 @ 07:29), Max: 99 (11-02-20 @ 20:02)  HR: 75 (11-03-20 @ 06:00) (70 - 97)  BP: 151/67 (11-03-20 @ 06:00) (123/59 - 155/69)  RR: 18 (11-03-20 @ 06:00) (15 - 25)  SpO2: 100% (11-03-20 @ 06:00) (98% - 100%)  Wt(kg): --    Mode: AC/ CMV (Assist Control/ Continuous Mandatory Ventilation), RR (machine): 14, TV (machine): 400, FiO2: 30, PEEP: 5        I&O's Summary    11-02 @ 07:01  -  11-03 @ 07:00  --------------------------------------------------------  IN: 1650 mL / OUT: 2040 mL / NET: -390 mL      PHYSICAL EXAM  General: frail appearing woman, awake but unresponsive, intubated, trached  CNS: contracted bedbound, does not follow commands, withdraws from painful stimuli, open eyes to name  HEENT: PERRL  Resp: CTABL, trache sight clean, no discharge  CVS: rrr no m/r/g  Abd: soft, nontender, mildly distended. PEG site occluded. No erythema, discharge  Ext: No cyanosis, edema  Skin: warm & perfused    MEDICATIONS  cefTRIAXone   IVPB IV Intermittent  hydrALAZINE Injectable IV Push PRN  dextrose 40% Gel Oral PRN  glucagon  Injectable IntraMuscular PRN  insulin lispro (ADMELOG) corrective regimen sliding scale SubCutaneous  ALBUTerol    90 MICROgram(s) HFA Inhaler Inhalation  ipratropium 17 MICROgram(s) HFA Inhaler Inhalation  fentaNYL   Patch  12 MICROgram(s)/Hr Transdermal  aspirin  chewable Oral  enoxaparin Injectable SubCutaneous  famotidine    Tablet Oral  psyllium Powder Oral  saline laxative (FLEET) Rectal Enema Rectal  sucralfate Oral  calcium carbonate 1250 mG  + Vitamin D (OsCal 500 + D) Oral  dextrose 5%. IV Continuous  atropine 1% Ophthalmic Solution for SubLingual Use SubLingual PRN  chlorhexidine 2% Cloths Topical  lactobacillus acidophilus Oral                          11.4   7.22  )-----------( 212      ( 03 Nov 2020 05:43 )             36.5       11-03    147<H>  |  113<H>  |  16  ----------------------------<  155<H>  4.2   |  28  |  1.00    Ca    9.6      03 Nov 2020 05:43  Phos  5.1     11-03  Mg     2.3     11-03    TPro  7.0  /  Alb  2.8<L>  /  TBili  0.4  /  DBili  x   /  AST  26  /  ALT  31  /  AlkPhos  79  11-03      CAPILLARY BLOOD GLUCOSE      POCT Blood Glucose.: 154 mg/dL (03 Nov 2020 05:02)  POCT Blood Glucose.: 157 mg/dL (02 Nov 2020 23:05)  POCT Blood Glucose.: 152 mg/dL (02 Nov 2020 19:15)      .Sputum Sputum   Mixed gram negative rods Culture includes  Few Pseudomonas aeruginosa  Normal Respiratory Elvira present   Few Squamous epithelial cells per low power field  Few polymorphonuclear leukocytes per low power field  Rare Yeast like cells per oil power field  Rare Gram Negative Rods per oil power field 10-31 @ 01:25  .Blood Blood-Peripheral   No growth to date. -- 10-30 @ 13:18           Radiology: no new imaging    CENTRAL LINE: N           REID: Y                        DATE INSERTED:  10/30/2020               GLOBAL ISSUE/BEST PRACTICE  Analgesia: Y  Sedation: N  HOB elevation: yes  Stress ulcer prophylaxis: Y  VTE prophylaxis: Y  Glycemic control: Y  Nutrition: Glucerna feeds through PEG      CODE STATUS: FULL  [facility] discussion: Y       Patient is a 74y old  Female who presents with a chief complaint of SOB (02 Nov 2020 17:45)    24 hour events: PEG tube clogged overnight, resolved by nursing in AM. No other acute events. Pt remained afebrile overnight    REVIEW OF SYSTEMS  unable to assess 2/2 mental status    T(F): 98.1 (11-03-20 @ 07:29), Max: 99 (11-02-20 @ 20:02)  HR: 75 (11-03-20 @ 06:00) (70 - 97)  BP: 151/67 (11-03-20 @ 06:00) (123/59 - 155/69)  RR: 18 (11-03-20 @ 06:00) (15 - 25)  SpO2: 100% (11-03-20 @ 06:00) (98% - 100%)  Wt(kg): --    Mode: AC/ CMV (Assist Control/ Continuous Mandatory Ventilation), RR (machine): 14, TV (machine): 400, FiO2: 30, PEEP: 5        I&O's Summary    11-02 @ 07:01  -  11-03 @ 07:00  --------------------------------------------------------  IN: 1650 mL / OUT: 2040 mL / NET: -390 mL      PHYSICAL EXAM  General: frail appearing woman, awake but unresponsive, intubated, trached  CNS: contracted bedbound, does not follow commands, withdraws from painful stimuli, open eyes to name  HEENT: PERRL  Resp: CTABL, trache sight clean, no discharge  CVS: rrr no m/r/g  Abd: soft, nontender, mildly distended. PEG site clean, no erythema, discharge  Ext: No cyanosis, edema  Skin: warm & perfused    MEDICATIONS  cefTRIAXone   IVPB IV Intermittent  hydrALAZINE Injectable IV Push PRN  dextrose 40% Gel Oral PRN  glucagon  Injectable IntraMuscular PRN  insulin lispro (ADMELOG) corrective regimen sliding scale SubCutaneous  ALBUTerol    90 MICROgram(s) HFA Inhaler Inhalation  ipratropium 17 MICROgram(s) HFA Inhaler Inhalation  fentaNYL   Patch  12 MICROgram(s)/Hr Transdermal  aspirin  chewable Oral  enoxaparin Injectable SubCutaneous  famotidine    Tablet Oral  psyllium Powder Oral  saline laxative (FLEET) Rectal Enema Rectal  sucralfate Oral  calcium carbonate 1250 mG  + Vitamin D (OsCal 500 + D) Oral  dextrose 5%. IV Continuous  atropine 1% Ophthalmic Solution for SubLingual Use SubLingual PRN  chlorhexidine 2% Cloths Topical  lactobacillus acidophilus Oral                          11.4   7.22  )-----------( 212      ( 03 Nov 2020 05:43 )             36.5       11-03    147<H>  |  113<H>  |  16  ----------------------------<  155<H>  4.2   |  28  |  1.00    Ca    9.6      03 Nov 2020 05:43  Phos  5.1     11-03  Mg     2.3     11-03    TPro  7.0  /  Alb  2.8<L>  /  TBili  0.4  /  DBili  x   /  AST  26  /  ALT  31  /  AlkPhos  79  11-03      CAPILLARY BLOOD GLUCOSE      POCT Blood Glucose.: 154 mg/dL (03 Nov 2020 05:02)  POCT Blood Glucose.: 157 mg/dL (02 Nov 2020 23:05)  POCT Blood Glucose.: 152 mg/dL (02 Nov 2020 19:15)      .Sputum Sputum   Mixed gram negative rods Culture includes  Few Pseudomonas aeruginosa  Normal Respiratory Elvira present   Few Squamous epithelial cells per low power field  Few polymorphonuclear leukocytes per low power field  Rare Yeast like cells per oil power field  Rare Gram Negative Rods per oil power field 10-31 @ 01:25  .Blood Blood-Peripheral   No growth to date. -- 10-30 @ 13:18           Radiology: no new imaging    CENTRAL LINE: N           REID: Y                        DATE INSERTED:  10/30/2020               GLOBAL ISSUE/BEST PRACTICE  Analgesia: Y  Sedation: N  HOB elevation: yes  Stress ulcer prophylaxis: Y  VTE prophylaxis: Y  Glycemic control: Y  Nutrition: Glucerna feeds through PEG      CODE STATUS: FULL  [facility] discussion: Y       Patient is a 74y old  Female who presents with a chief complaint of SOB (02 Nov 2020 17:45)    24 hour events: PEG tube clogged overnight, resolved by nursing in AM. No other acute events. Pt remained afebrile overnight.    REVIEW OF SYSTEMS  unable to assess 2/2 mental status    T(F): 98.1 (11-03-20 @ 07:29), Max: 99 (11-02-20 @ 20:02)  HR: 75 (11-03-20 @ 06:00) (70 - 97)  BP: 151/67 (11-03-20 @ 06:00) (123/59 - 155/69)  RR: 18 (11-03-20 @ 06:00) (15 - 25)  SpO2: 100% (11-03-20 @ 06:00) (98% - 100%)  Wt(kg): --    Mode: AC/ CMV (Assist Control/ Continuous Mandatory Ventilation), RR (machine): 14, TV (machine): 400, FiO2: 30, PEEP: 5        I&O's Summary    11-02 @ 07:01  -  11-03 @ 07:00  --------------------------------------------------------  IN: 1650 mL / OUT: 2040 mL / NET: -390 mL      PHYSICAL EXAM  General: frail appearing woman, awake but unresponsive, intubated, trached  CNS: contracted bedbound, does not follow commands, withdraws from painful stimuli, open eyes to name  HEENT: PERRL  Resp: CTABL, trache sight clean, no discharge  CVS: rrr no m/r/g  Abd: soft, nontender, mildly distended. PEG site clean, no erythema, discharge  Ext: No cyanosis, edema  Skin: warm & perfused    MEDICATIONS  cefTRIAXone   IVPB IV Intermittent  hydrALAZINE Injectable IV Push PRN  dextrose 40% Gel Oral PRN  glucagon  Injectable IntraMuscular PRN  insulin lispro (ADMELOG) corrective regimen sliding scale SubCutaneous  ALBUTerol    90 MICROgram(s) HFA Inhaler Inhalation  ipratropium 17 MICROgram(s) HFA Inhaler Inhalation  fentaNYL   Patch  12 MICROgram(s)/Hr Transdermal  aspirin  chewable Oral  enoxaparin Injectable SubCutaneous  famotidine    Tablet Oral  psyllium Powder Oral  saline laxative (FLEET) Rectal Enema Rectal  sucralfate Oral  calcium carbonate 1250 mG  + Vitamin D (OsCal 500 + D) Oral  dextrose 5%. IV Continuous  atropine 1% Ophthalmic Solution for SubLingual Use SubLingual PRN  chlorhexidine 2% Cloths Topical  lactobacillus acidophilus Oral                          11.4   7.22  )-----------( 212      ( 03 Nov 2020 05:43 )             36.5       11-03    147<H>  |  113<H>  |  16  ----------------------------<  155<H>  4.2   |  28  |  1.00    Ca    9.6      03 Nov 2020 05:43  Phos  5.1     11-03  Mg     2.3     11-03    TPro  7.0  /  Alb  2.8<L>  /  TBili  0.4  /  DBili  x   /  AST  26  /  ALT  31  /  AlkPhos  79  11-03      CAPILLARY BLOOD GLUCOSE      POCT Blood Glucose.: 154 mg/dL (03 Nov 2020 05:02)  POCT Blood Glucose.: 157 mg/dL (02 Nov 2020 23:05)  POCT Blood Glucose.: 152 mg/dL (02 Nov 2020 19:15)      .Sputum Sputum   Mixed gram negative rods Culture includes  Few Pseudomonas aeruginosa  Normal Respiratory Elvira present   Few Squamous epithelial cells per low power field  Few polymorphonuclear leukocytes per low power field  Rare Yeast like cells per oil power field  Rare Gram Negative Rods per oil power field 10-31 @ 01:25  .Blood Blood-Peripheral   No growth to date. -- 10-30 @ 13:18           Radiology: no new imaging    CENTRAL LINE: N           REID: Y                        DATE INSERTED:  10/30/2020               GLOBAL ISSUE/BEST PRACTICE  Analgesia: Y  Sedation: N  HOB elevation: yes  Stress ulcer prophylaxis: Y  VTE prophylaxis: Y  Glycemic control: Y  Nutrition: Glucerna feeds through PEG      CODE STATUS: FULL  [facility] discussion: Y       Patient is a 74y old  Female who presents with a chief complaint of SOB (02 Nov 2020 17:45)    24 hour events: PEG tube clogged overnight, resolved by nursing in AM. No other acute events. Pt remained afebrile overnight.    REVIEW OF SYSTEMS  unable to assess 2/2 mental status    T(F): 98.1 (11-03-20 @ 07:29), Max: 99 (11-02-20 @ 20:02)  HR: 75 (11-03-20 @ 06:00) (70 - 97)  BP: 151/67 (11-03-20 @ 06:00) (123/59 - 155/69)  RR: 18 (11-03-20 @ 06:00) (15 - 25)  SpO2: 100% (11-03-20 @ 06:00) (98% - 100%)  Wt(kg): --    Mode: AC/ CMV (Assist Control/ Continuous Mandatory Ventilation), RR (machine): 14, TV (machine): 400, FiO2: 30, PEEP: 5        I&O's Summary    11-02 @ 07:01  -  11-03 @ 07:00  --------------------------------------------------------  IN: 1650 mL / OUT: 2040 mL / NET: -390 mL      PHYSICAL EXAM  General: frail appearing woman, awake but unresponsive, intubated, trached  CNS: contracted bedbound, does not follow commands, withdraws from painful stimuli, open eyes to name  HEENT: PERRL  Resp: CTABL, trache sight clean, no discharge  CVS: rrr no m/r/g  Abd: soft, nontender, mildly distended. PEG site clean, no erythema, discharge  Ext: No cyanosis, edema  Skin: warm & perfused    MEDICATIONS  cefTRIAXone   IVPB IV Intermittent  hydrALAZINE Injectable IV Push PRN  dextrose 40% Gel Oral PRN  glucagon  Injectable IntraMuscular PRN  insulin lispro (ADMELOG) corrective regimen sliding scale SubCutaneous  ALBUTerol    90 MICROgram(s) HFA Inhaler Inhalation  ipratropium 17 MICROgram(s) HFA Inhaler Inhalation  fentaNYL   Patch  12 MICROgram(s)/Hr Transdermal  aspirin  chewable Oral  enoxaparin Injectable SubCutaneous  famotidine    Tablet Oral  psyllium Powder Oral  saline laxative (FLEET) Rectal Enema Rectal  sucralfate Oral  calcium carbonate 1250 mG  + Vitamin D (OsCal 500 + D) Oral  dextrose 5%. IV Continuous  atropine 1% Ophthalmic Solution for SubLingual Use SubLingual PRN  chlorhexidine 2% Cloths Topical  lactobacillus acidophilus Oral                          11.4   7.22  )-----------( 212      ( 03 Nov 2020 05:43 )             36.5       11-03    147<H>  |  113<H>  |  16  ----------------------------<  155<H>  4.2   |  28  |  1.00    Ca    9.6      03 Nov 2020 05:43  Phos  5.1     11-03  Mg     2.3     11-03    TPro  7.0  /  Alb  2.8<L>  /  TBili  0.4  /  DBili  x   /  AST  26  /  ALT  31  /  AlkPhos  79  11-03      CAPILLARY BLOOD GLUCOSE      POCT Blood Glucose.: 154 mg/dL (03 Nov 2020 05:02)  POCT Blood Glucose.: 157 mg/dL (02 Nov 2020 23:05)  POCT Blood Glucose.: 152 mg/dL (02 Nov 2020 19:15)      .Sputum Sputum   Mixed gram negative rods Culture includes  Few Pseudomonas aeruginosa  Normal Respiratory Elvira present   Few Squamous epithelial cells per low power field  Few polymorphonuclear leukocytes per low power field  Rare Yeast like cells per oil power field  Rare Gram Negative Rods per oil power field 10-31 @ 01:25  .Blood Blood-Peripheral   No growth to date. -- 10-30 @ 13:18           Radiology: no new imaging    CENTRAL LINE: N           REID: Y                        DATE INSERTED:  10/30/2020               GLOBAL ISSUE/BEST PRACTICE  Analgesia: Y  Sedation: N  HOB elevation: yes  Stress ulcer prophylaxis: Y  VTE prophylaxis: Y  Glycemic control: Y  Nutrition: Glucerna feeds through PEG      CODE STATUS: FULL

## 2020-11-03 NOTE — PROGRESS NOTE ADULT - SUBJECTIVE AND OBJECTIVE BOX
BREA BECKHAM    [facility] [place] [facility] 18    Patient is a 74y old  Female who presents with a chief complaint of SOB (02 Nov 2020 17:45)       Allergies    codeine (Hives)    Intolerances        HPI:  73 yo Female sent from [facility] with respiratory distress. No report of fever or hypoxia. Patient trached/vented ,s/p peg  and non-verbal, unable to contribute to history. Transferred to [place] from [place] ED for admission Admitted for septic workup and evaluation ,send blood and urine cx,serial lactate levels,monitor vitals closely hydration,monitor urine output and renal profile, iv abx initiated -s/p vanco and zosyn . BP stable upon [place] [facility] ED arrival Med hx is significant for Advanced dementia ,s/p  Aphasic stroke    Constipation  ,COVID-19   ,CVA (cerebral vascular accident)  ,Dementia of frontal lobe type  ,Diabetes mellitus  ,DM (diabetes mellitus)  ,GERD (gastroesophageal reflux disease)    HTN (hypertension)  ,Hypertension  ,Pneumonia  ,Quadriplegia  ,Respiratory failure ,s/p tracheostomy and peg .Found to have RYAN ,hypernatremia and lactate elevated Admit for iv hydration,monitor renal profile and urine output,nutritionist consult,prealbumin level,serial bmp,nutritional supplements, Palliative care consult requested ,to discuss advance directives and complete MOLST  (30 Oct 2020 07:14)      PAST MEDICAL & SURGICAL HISTORY:  Pneumonia    Quadriplegia    COVID-19 virus detected    Advanced dementia    DM (diabetes mellitus)    HTN (hypertension)    CVA (cerebral vascular accident)    Respiratory failure    Constipation    GERD (gastroesophageal reflux disease)    Hypertension    Respiratory failure    Diabetes mellitus    Aphasic stroke    Dementia of frontal lobe type    Feeding by G-tube    Tracheostomy tube present    Tracheostomy in place    Gastrostomy in place    Hx of appendectomy        FAMILY HISTORY:  No pertinent family history in first degree relatives          MEDICATIONS   ALBUTerol    90 MICROgram(s) HFA Inhaler 4 Puff(s) Inhalation every 6 hours  aspirin  chewable 81 milliGRAM(s) Oral daily  atropine 1% Ophthalmic Solution for SubLingual Use 1 Drop(s) SubLingual three times a day PRN  calcium carbonate 1250 mG  + Vitamin D (OsCal 500 + D) 1 Tablet(s) Oral two times a day  cefTRIAXone   IVPB 1000 milliGRAM(s) IV Intermittent every 24 hours  chlorhexidine 2% Cloths 1 Application(s) Topical daily  dextrose 40% Gel 15 Gram(s) Oral once PRN  dextrose 5%. 1000 milliLiter(s) IV Continuous <Continuous>  enoxaparin Injectable 40 milliGRAM(s) SubCutaneous daily  famotidine    Tablet 20 milliGRAM(s) Oral daily  fentaNYL   Patch  12 MICROgram(s)/Hr 1 Patch Transdermal every 72 hours  glucagon  Injectable 1 milliGRAM(s) IntraMuscular once PRN  hydrALAZINE Injectable 10 milliGRAM(s) IV Push every 4 hours PRN  insulin lispro (ADMELOG) corrective regimen sliding scale   SubCutaneous every 6 hours  ipratropium 17 MICROgram(s) HFA Inhaler 4 Puff(s) Inhalation every 6 hours  lactobacillus acidophilus 1 Tablet(s) Oral two times a day  psyllium Powder 1 Packet(s) Oral at bedtime  saline laxative (FLEET) Rectal Enema 1 Enema Rectal at bedtime  sucralfate 1 Gram(s) Oral two times a day      Vital Signs Last 24 Hrs  T(C): 36.7 (03 Nov 2020 07:29), Max: 37.2 (02 Nov 2020 20:02)  T(F): 98.1 (03 Nov 2020 07:29), Max: 99 (02 Nov 2020 20:02)  HR: 81 (03 Nov 2020 07:54) (69 - 97)  BP: 151/66 (03 Nov 2020 07:00) (123/59 - 155/69)  BP(mean): 95 (03 Nov 2020 07:00) (83 - 99)  RR: 20 (03 Nov 2020 07:00) (15 - 25)  SpO2: 100% (03 Nov 2020 07:54) (98% - 100%)      11-02-20 @ 07:01  -  11-03-20 @ 07:00  --------------------------------------------------------  IN: 1650 mL / OUT: 2040 mL / NET: -390 mL        Mode: VC+, RR (machine): 14, TV (machine): 400, FiO2: 30, PEEP: 5, ITime: 1, MAP: 8.8, PIP: 17    LABS:                        11.4   7.22  )-----------( 212      ( 03 Nov 2020 05:43 )             36.5     11-03    147<H>  |  113<H>  |  16  ----------------------------<  155<H>  4.2   |  28  |  1.00    Ca    9.6      03 Nov 2020 05:43  Phos  5.1     11-03  Mg     2.3     11-03    TPro  7.0  /  Alb  2.8<L>  /  TBili  0.4  /  DBili  x   /  AST  26  /  ALT  31  /  AlkPhos  79  11-03              WBC:  WBC Count: 7.22 K/uL (11-03 @ 05:43)  WBC Count: 7.82 K/uL (11-02 @ 05:42)  WBC Count: 11.74 K/uL (11-01 @ 05:46)  WBC Count: 6.31 K/uL (10-31 @ 05:07)      MICROBIOLOGY:  RECENT CULTURES:  10-31 .Sputum Sputum Pseudomonas aeruginosa   Few Squamous epithelial cells per low power field  Few polymorphonuclear leukocytes per low power field  Rare Yeast like cells per oil power field  Rare Gram Negative Rods per oil power field   Mixed gram negative rods Culture includes  Few Pseudomonas aeruginosa  Normal Respiratory Elvira present    10-30 .Blood Blood-Peripheral Proteus mirabilis XXXX   No growth to date.                    Sodium:  Sodium, Serum: 147 mmol/L (11-03 @ 05:43)  Sodium, Serum: 143 mmol/L (11-02 @ 05:42)  Sodium, Serum: 147 mmol/L (11-01 @ 05:46)  Sodium, Serum: 149 mmol/L (10-31 @ 05:07)      1.00 mg/dL 11-03 @ 05:43  0.95 mg/dL 11-02 @ 05:42  1.00 mg/dL 11-01 @ 05:46  1.00 mg/dL 10-31 @ 05:07      Hemoglobin:  Hemoglobin: 11.4 g/dL (11-03 @ 05:43)  Hemoglobin: 11.1 g/dL (11-02 @ 05:42)  Hemoglobin: 11.3 g/dL (11-01 @ 05:46)  Hemoglobin: 10.0 g/dL (10-31 @ 05:07)      Platelets: Platelet Count - Automated: 212 K/uL (11-03 @ 05:43)  Platelet Count - Automated: 225 K/uL (11-02 @ 05:42)  Platelet Count - Automated: 197 K/uL (11-01 @ 05:46)  Platelet Count - Automated: 156 K/uL (10-31 @ 05:07)      LIVER FUNCTIONS - ( 03 Nov 2020 05:43 )  Alb: 2.8 g/dL / Pro: 7.0 g/dL / ALK PHOS: 79 U/L / ALT: 31 U/L / AST: 26 U/L / GGT: x                 RADIOLOGY & ADDITIONAL STUDIES:

## 2020-11-03 NOTE — PROGRESS NOTE ADULT - ASSESSMENT
73 yo Female sent from [facility] with respiratory distress. No report of fever or hypoxia. Patient trached/vented ,s/p peg  and non-verbal, unable to contribute to history. Transferred to [place] from [place] ED for admission Admitted for septic workup and evaluation ,send blood and urine cx,serial lactate levels,monitor vitals closely hydration,monitor urine output and renal profile, iv abx initiated -s/p vanco and zosyn . BP stable upon [place] ED arrival Med hx is significant for Advanced dementia ,s/p  Aphasic stroke    Constipation  ,COVID-19   ,CVA (cerebral vascular accident)  ,Dementia of frontal lobe type  ,Diabetes mellitus  ,DM (diabetes mellitus)  ,GERD (gastroesophageal reflux disease)    HTN (hypertension)  ,Hypertension  ,Pneumonia  ,Quadriplegia  ,Respiratory failure s/p tracheostomy and peg .Found to have RYAN ,hypernatremia and lactate elevated Admit for iv hydration,monitor renal profile and urine output,nutritionist consult,prealbumin level,serial bmp,nutritional supplements Palliative care consult requested ,to discuss advance directives and complete MOLST

## 2020-11-03 NOTE — PROGRESS NOTE ADULT - SUBJECTIVE AND OBJECTIVE BOX
[facility] DIVISION of INFECTIOUS DISEASE  Arturo Olivas MD PhD, Haylee Umanzor MD, Andressa Brantley MD, Billy Valenzuela MD  and providing coverage with Alexa Canas MD and Eusebio Chairez MD  Providing Infectious Disease Consultations at [facility], [facility], [facility]'s    Office# [phone] to schedule follow up appointments  Answering Service for urgent calls or New Consults [phone]  Cell# to text for urgent issues Arturo Olivas [phone]     infectious diseases progress note:    BREA BECKHAM is a 74y y. o. Female patient    Patient remains intubated    Allergies    codeine (Hives)    Intolerances        ANTIBIOTICS/RELEVANT:  antimicrobials  cefTRIAXone   IVPB 1000 milliGRAM(s) IV Intermittent every 24 hours    immunologic:    OTHER:  ALBUTerol    90 MICROgram(s) HFA Inhaler 4 Puff(s) Inhalation every 6 hours  aspirin  chewable 81 milliGRAM(s) Oral daily  atropine 1% Ophthalmic Solution for SubLingual Use 1 Drop(s) SubLingual three times a day PRN  calcium carbonate 1250 mG  + Vitamin D (OsCal 500 + D) 1 Tablet(s) Oral two times a day  chlorhexidine 2% Cloths 1 Application(s) Topical daily  dextrose 40% Gel 15 Gram(s) Oral once PRN  dextrose 5%. 1000 milliLiter(s) IV Continuous <Continuous>  enoxaparin Injectable 40 milliGRAM(s) SubCutaneous daily  famotidine    Tablet 20 milliGRAM(s) Oral daily  fentaNYL   Patch  12 MICROgram(s)/Hr 1 Patch Transdermal every 72 hours  glucagon  Injectable 1 milliGRAM(s) IntraMuscular once PRN  hydrALAZINE Injectable 10 milliGRAM(s) IV Push every 4 hours PRN  insulin lispro (ADMELOG) corrective regimen sliding scale   SubCutaneous every 6 hours  ipratropium 17 MICROgram(s) HFA Inhaler 4 Puff(s) Inhalation every 6 hours  lactobacillus acidophilus 1 Tablet(s) Oral two times a day  psyllium Powder 1 Packet(s) Oral at bedtime  saline laxative (FLEET) Rectal Enema 1 Enema Rectal at bedtime  sucralfate 1 Gram(s) Oral two times a day      Objective:  Last 24-Vital Signs Last 24 Hrs  T(C): 37.1 (03 Nov 2020 11:25), Max: 37.2 (02 Nov 2020 20:02)  T(F): 98.8 (03 Nov 2020 11:25), Max: 99 (02 Nov 2020 20:02)  HR: 64 (03 Nov 2020 11:05) (64 - 95)  BP: 151/66 (03 Nov 2020 07:00) (125/58 - 155/69)  BP(mean): 95 (03 Nov 2020 07:00) (83 - 99)  RR: 20 (03 Nov 2020 07:00) (15 - 25)  SpO2: 100% (03 Nov 2020 11:05) (98% - 100%)    T(C): 37.1 (11-03-20 @ 11:25), Max: 37.2 (11-01-20 @ 23:17)  T(F): 98.8 (11-03-20 @ 11:25), Max: 99 (11-02-20 @ 20:02)  T(C): 37.1 (11-03-20 @ 11:25), Max: 37.2 (11-01-20 @ 23:17)  T(F): 98.8 (11-03-20 @ 11:25), Max: 99 (11-02-20 @ 20:02)  T(C): 37.1 (11-03-20 @ 11:25), Max: 37.2 (11-01-20 @ 23:17)  T(F): 98.8 (11-03-20 @ 11:25), Max: 99 (11-02-20 @ 20:02)    PHYSICAL EXAM:  Constitutional: Well-developed, well nourished  Eyes: PERRLA, EOMI intubated  Respiratory: no accessory muscle use, lung fields bilaterally clear  Cardiovascular: RRR, normal S1, S2 no m/r/g  Gastrointestinal: soft, NT, no HSM, BS-normal  Extremities: no clubbing, no cyanosis, edema noted  Neuro: patient opening eyes  Skin: no sig lesions      LABS:                        11.4   7.22  )-----------( 212      ( 03 Nov 2020 05:43 )             36.5       WBC 7.22  11-03 @ 05:43  WBC 7.82  11-02 @ 05:42  WBC 11.74  11-01 @ 05:46  WBC 6.31  10-31 @ 05:07  WBC 10.22  10-30 @ 06:48  WBC 20.99  10-29 @ 22:39      11-03    147<H>  |  113<H>  |  16  ----------------------------<  155<H>  4.2   |  28  |  1.00    Ca    9.6      03 Nov 2020 05:43  Phos  5.1     11-03  Mg     2.3     11-03    TPro  7.0  /  Alb  2.8<L>  /  TBili  0.4  /  DBili  x   /  AST  26  /  ALT  31  /  AlkPhos  79  11-03      Creatinine, Serum: 1.00 mg/dL (11-03-20 @ 05:43)  Creatinine, Serum: 0.95 mg/dL (11-02-20 @ 05:42)  Creatinine, Serum: 1.00 mg/dL (11-01-20 @ 05:46)  Creatinine, Serum: 1.00 mg/dL (10-31-20 @ 05:07)  Creatinine, Serum: 1.40 mg/dL (10-30-20 @ 06:48)  Creatinine, Serum: 2.29 mg/dL (10-29-20 @ 22:39)      MICROBIOLOGY:        RADIOLOGY & ADDITIONAL STUDIES:

## 2020-11-03 NOTE — PROGRESS NOTE ADULT - SUBJECTIVE AND OBJECTIVE BOX
Date/Time Patient Seen:  		  Referring MD:   Data Reviewed	       Patient is a 74y old  Female who presents with a chief complaint of SOB (02 Nov 2020 17:45)      Subjective/HPI     PAST MEDICAL & SURGICAL HISTORY:  Pneumonia    Quadriplegia    COVID-19 virus detected    Advanced dementia    DM (diabetes mellitus)    HTN (hypertension)    CVA (cerebral vascular accident)    Respiratory failure    Constipation    GERD (gastroesophageal reflux disease)    Hypertension    Respiratory failure    Diabetes mellitus    Aphasic stroke    Dementia of frontal lobe type    Feeding by G-tube    Tracheostomy tube present    Tracheostomy in place    Gastrostomy in place    Hx of appendectomy          Medication list         MEDICATIONS  (STANDING):  ALBUTerol    90 MICROgram(s) HFA Inhaler 4 Puff(s) Inhalation every 6 hours  aspirin  chewable 81 milliGRAM(s) Oral daily  calcium carbonate 1250 mG  + Vitamin D (OsCal 500 + D) 1 Tablet(s) Oral two times a day  cefTRIAXone   IVPB 1000 milliGRAM(s) IV Intermittent every 24 hours  chlorhexidine 2% Cloths 1 Application(s) Topical daily  dextrose 5%. 1000 milliLiter(s) (50 mL/Hr) IV Continuous <Continuous>  enoxaparin Injectable 40 milliGRAM(s) SubCutaneous daily  famotidine    Tablet 20 milliGRAM(s) Oral daily  fentaNYL   Patch  12 MICROgram(s)/Hr 1 Patch Transdermal every 72 hours  insulin lispro (ADMELOG) corrective regimen sliding scale   SubCutaneous every 6 hours  ipratropium 17 MICROgram(s) HFA Inhaler 4 Puff(s) Inhalation every 6 hours  lactobacillus acidophilus 1 Tablet(s) Oral two times a day  psyllium Powder 1 Packet(s) Oral at bedtime  saline laxative (FLEET) Rectal Enema 1 Enema Rectal at bedtime  sucralfate 1 Gram(s) Oral two times a day    MEDICATIONS  (PRN):  atropine 1% Ophthalmic Solution for SubLingual Use 1 Drop(s) SubLingual three times a day PRN salicary secretion  dextrose 40% Gel 15 Gram(s) Oral once PRN Blood Glucose LESS THAN 70 milliGRAM(s)/deciliter  glucagon  Injectable 1 milliGRAM(s) IntraMuscular once PRN Glucose LESS THAN 70 milligrams/deciliter  hydrALAZINE Injectable 10 milliGRAM(s) IV Push every 4 hours PRN SBP > 150         Vitals log        ICU Vital Signs Last 24 Hrs  T(C): 37 (03 Nov 2020 03:39), Max: 37.2 (02 Nov 2020 20:02)  T(F): 98.6 (03 Nov 2020 03:39), Max: 99 (02 Nov 2020 20:02)  HR: 75 (03 Nov 2020 06:00) (70 - 97)  BP: 151/67 (03 Nov 2020 06:00) (123/59 - 155/69)  BP(mean): 96 (03 Nov 2020 06:00) (83 - 99)  ABP: --  ABP(mean): --  RR: 18 (03 Nov 2020 06:00) (15 - 25)  SpO2: 100% (03 Nov 2020 06:00) (98% - 100%)       Mode: AC/ CMV (Assist Control/ Continuous Mandatory Ventilation)  RR (machine): 14  TV (machine): 400  FiO2: 30  PEEP: 5  ITime: 1  MAP: 9.8  PIP: 18      Input and Output:  I&O's Detail    02 Nov 2020 07:01  -  03 Nov 2020 07:00  --------------------------------------------------------  IN:    Free Water: 750 mL    Glucerna: 900 mL  Total IN: 1650 mL    OUT:    Indwelling Catheter - Urethral (mL): 2040 mL  Total OUT: 2040 mL    Total NET: -390 mL          Lab Data                        11.4   7.22  )-----------( 212      ( 03 Nov 2020 05:43 )             36.5     11-03    147<H>  |  113<H>  |  16  ----------------------------<  155<H>  4.2   |  28  |  1.00    Ca    9.6      03 Nov 2020 05:43  Phos  5.1     11-03  Mg     2.3     11-03    TPro  7.0  /  Alb  2.8<L>  /  TBili  0.4  /  DBili  x   /  AST  26  /  ALT  31  /  AlkPhos  79  11-03            Review of Systems	      Objective     Physical Examination    heart s1s2  lung dec BS  abd soft      Pertinent Lab findings & Imaging      Annalise:  NO   Adequate UO     I&O's Detail    02 Nov 2020 07:01  -  03 Nov 2020 07:00  --------------------------------------------------------  IN:    Free Water: 750 mL    Glucerna: 900 mL  Total IN: 1650 mL    OUT:    Indwelling Catheter - Urethral (mL): 2040 mL  Total OUT: 2040 mL    Total NET: -390 mL               Discussed with:     Cultures:	        Radiology

## 2020-11-04 LAB
ANION GAP SERPL CALC-SCNC: 6 MMOL/L — SIGNIFICANT CHANGE UP (ref 5–17)
BASOPHILS # BLD AUTO: 0.03 K/UL — SIGNIFICANT CHANGE UP (ref 0–0.2)
BASOPHILS NFR BLD AUTO: 0.4 % — SIGNIFICANT CHANGE UP (ref 0–2)
BUN SERPL-MCNC: 16 MG/DL — SIGNIFICANT CHANGE UP (ref 7–23)
CALCIUM SERPL-MCNC: 9.3 MG/DL — SIGNIFICANT CHANGE UP (ref 8.5–10.1)
CHLORIDE SERPL-SCNC: 111 MMOL/L — HIGH (ref 96–108)
CO2 SERPL-SCNC: 29 MMOL/L — SIGNIFICANT CHANGE UP (ref 22–31)
CREAT SERPL-MCNC: 0.91 MG/DL — SIGNIFICANT CHANGE UP (ref 0.5–1.3)
CULTURE RESULTS: SIGNIFICANT CHANGE UP
CULTURE RESULTS: SIGNIFICANT CHANGE UP
EOSINOPHIL # BLD AUTO: 0.1 K/UL — SIGNIFICANT CHANGE UP (ref 0–0.5)
EOSINOPHIL NFR BLD AUTO: 1.4 % — SIGNIFICANT CHANGE UP (ref 0–6)
GLUCOSE SERPL-MCNC: 181 MG/DL — HIGH (ref 70–99)
HCT VFR BLD CALC: 32.5 % — LOW (ref 34.5–45)
HGB BLD-MCNC: 10.5 G/DL — LOW (ref 11.5–15.5)
IMM GRANULOCYTES NFR BLD AUTO: 0.7 % — SIGNIFICANT CHANGE UP (ref 0–1.5)
LYMPHOCYTES # BLD AUTO: 1.92 K/UL — SIGNIFICANT CHANGE UP (ref 1–3.3)
LYMPHOCYTES # BLD AUTO: 26.2 % — SIGNIFICANT CHANGE UP (ref 13–44)
MAGNESIUM SERPL-MCNC: 2.4 MG/DL — SIGNIFICANT CHANGE UP (ref 1.6–2.6)
MCHC RBC-ENTMCNC: 27.9 PG — SIGNIFICANT CHANGE UP (ref 27–34)
MCHC RBC-ENTMCNC: 32.3 GM/DL — SIGNIFICANT CHANGE UP (ref 32–36)
MCV RBC AUTO: 86.2 FL — SIGNIFICANT CHANGE UP (ref 80–100)
MONOCYTES # BLD AUTO: 0.57 K/UL — SIGNIFICANT CHANGE UP (ref 0–0.9)
MONOCYTES NFR BLD AUTO: 7.8 % — SIGNIFICANT CHANGE UP (ref 2–14)
NEUTROPHILS # BLD AUTO: 4.66 K/UL — SIGNIFICANT CHANGE UP (ref 1.8–7.4)
NEUTROPHILS NFR BLD AUTO: 63.5 % — SIGNIFICANT CHANGE UP (ref 43–77)
NRBC # BLD: 0 /100 WBCS — SIGNIFICANT CHANGE UP (ref 0–0)
PHOSPHATE SERPL-MCNC: 4.3 MG/DL — SIGNIFICANT CHANGE UP (ref 2.5–4.5)
PLATELET # BLD AUTO: 207 K/UL — SIGNIFICANT CHANGE UP (ref 150–400)
POTASSIUM SERPL-MCNC: 3.6 MMOL/L — SIGNIFICANT CHANGE UP (ref 3.5–5.3)
POTASSIUM SERPL-SCNC: 3.6 MMOL/L — SIGNIFICANT CHANGE UP (ref 3.5–5.3)
RBC # BLD: 3.77 M/UL — LOW (ref 3.8–5.2)
RBC # FLD: 15.2 % — HIGH (ref 10.3–14.5)
SODIUM SERPL-SCNC: 146 MMOL/L — HIGH (ref 135–145)
SPECIMEN SOURCE: SIGNIFICANT CHANGE UP
SPECIMEN SOURCE: SIGNIFICANT CHANGE UP
WBC # BLD: 7.33 K/UL — SIGNIFICANT CHANGE UP (ref 3.8–10.5)
WBC # FLD AUTO: 7.33 K/UL — SIGNIFICANT CHANGE UP (ref 3.8–10.5)

## 2020-11-04 PROCEDURE — 99233 SBSQ HOSP IP/OBS HIGH 50: CPT | Mod: GC

## 2020-11-04 RX ORDER — POLYETHYLENE GLYCOL 3350 17 G/17G
17 POWDER, FOR SOLUTION ORAL ONCE
Refills: 0 | Status: COMPLETED | OUTPATIENT
Start: 2020-11-04 | End: 2020-11-04

## 2020-11-04 RX ADMIN — Medication 1 TABLET(S): at 05:50

## 2020-11-04 RX ADMIN — FENTANYL CITRATE 1 PATCH: 50 INJECTION INTRAVENOUS at 19:34

## 2020-11-04 RX ADMIN — Medication 1 GRAM(S): at 17:26

## 2020-11-04 RX ADMIN — Medication 4 PUFF(S): at 20:21

## 2020-11-04 RX ADMIN — Medication 4 PUFF(S): at 14:27

## 2020-11-04 RX ADMIN — Medication 4: at 12:00

## 2020-11-04 RX ADMIN — Medication 1 TABLET(S): at 17:25

## 2020-11-04 RX ADMIN — FENTANYL CITRATE 1 PATCH: 50 INJECTION INTRAVENOUS at 07:37

## 2020-11-04 RX ADMIN — ALBUTEROL 4 PUFF(S): 90 AEROSOL, METERED ORAL at 02:12

## 2020-11-04 RX ADMIN — ALBUTEROL 4 PUFF(S): 90 AEROSOL, METERED ORAL at 14:27

## 2020-11-04 RX ADMIN — Medication 81 MILLIGRAM(S): at 11:58

## 2020-11-04 RX ADMIN — POLYETHYLENE GLYCOL 3350 17 GRAM(S): 17 POWDER, FOR SOLUTION ORAL at 17:25

## 2020-11-04 RX ADMIN — CEFTRIAXONE 100 MILLIGRAM(S): 500 INJECTION, POWDER, FOR SOLUTION INTRAMUSCULAR; INTRAVENOUS at 11:58

## 2020-11-04 RX ADMIN — ENOXAPARIN SODIUM 40 MILLIGRAM(S): 100 INJECTION SUBCUTANEOUS at 11:58

## 2020-11-04 RX ADMIN — Medication 1 ENEMA: at 22:45

## 2020-11-04 RX ADMIN — FAMOTIDINE 20 MILLIGRAM(S): 10 INJECTION INTRAVENOUS at 11:58

## 2020-11-04 RX ADMIN — Medication 4 PUFF(S): at 02:12

## 2020-11-04 RX ADMIN — CHLORHEXIDINE GLUCONATE 1 APPLICATION(S): 213 SOLUTION TOPICAL at 11:59

## 2020-11-04 RX ADMIN — ALBUTEROL 4 PUFF(S): 90 AEROSOL, METERED ORAL at 20:21

## 2020-11-04 RX ADMIN — ALBUTEROL 4 PUFF(S): 90 AEROSOL, METERED ORAL at 08:07

## 2020-11-04 RX ADMIN — Medication 2: at 17:32

## 2020-11-04 RX ADMIN — Medication 1 GRAM(S): at 05:51

## 2020-11-04 RX ADMIN — Medication 4 PUFF(S): at 08:07

## 2020-11-04 NOTE — PROGRESS NOTE ADULT - SUBJECTIVE AND OBJECTIVE BOX
BREA BECKHAM    [facility] ICU1 18    Patient is a 74y old  Female who presents with a chief complaint of SOB (02 Nov 2020 17:45)       Allergies    codeine (Hives)    Intolerances        HPI:  75 yo Female sent from [facility] with respiratory distress. No report of fever or hypoxia. Patient trached/vented ,s/p peg  and non-verbal, unable to contribute to history. Transferred to [place] from [place] ED for admission Admitted for septic workup and evaluation ,send blood and urine cx,serial lactate levels,monitor vitals closely hydration,monitor urine output and renal profile, iv abx initiated -s/p vanco and zosyn . BP stable upon [place] ED arrival Med hx is significant for Advanced dementia ,s/p  Aphasic stroke    Constipation  ,COVID-19   ,CVA (cerebral vascular accident)  ,Dementia of frontal lobe type  ,Diabetes mellitus  ,DM (diabetes mellitus)  ,GERD (gastroesophageal reflux disease)    HTN (hypertension)  ,Hypertension  ,Pneumonia  ,Quadriplegia  ,Respiratory failure ,s/p tracheostomy and peg .Found to have RYAN ,hypernatremia and lactate elevated Admit for iv hydration,monitor renal profile and urine output,nutritionist consult,prealbumin level,serial bmp,nutritional supplements, Palliative care consult requested ,to discuss advance directives and complete MOLST  (30 Oct 2020 07:14)      PAST MEDICAL & SURGICAL HISTORY:  Pneumonia    Quadriplegia    COVID-19 virus detected    Advanced dementia    DM (diabetes mellitus)    HTN (hypertension)    CVA (cerebral vascular accident)    Respiratory failure    Constipation    GERD (gastroesophageal reflux disease)    Hypertension    Respiratory failure    Diabetes mellitus    Aphasic stroke    Dementia of frontal lobe type    Feeding by G-tube    Tracheostomy tube present    Tracheostomy in place    Gastrostomy in place    Hx of appendectomy        FAMILY HISTORY:  No pertinent family history in first degree relatives          MEDICATIONS   ALBUTerol    90 MICROgram(s) HFA Inhaler 4 Puff(s) Inhalation every 6 hours  aspirin  chewable 81 milliGRAM(s) Oral daily  atropine 1% Ophthalmic Solution for SubLingual Use 1 Drop(s) SubLingual three times a day PRN  calcium carbonate 1250 mG  + Vitamin D (OsCal 500 + D) 1 Tablet(s) Oral two times a day  cefTRIAXone   IVPB 1000 milliGRAM(s) IV Intermittent every 24 hours  chlorhexidine 2% Cloths 1 Application(s) Topical daily  dextrose 40% Gel 15 Gram(s) Oral once PRN  dextrose 5%. 1000 milliLiter(s) IV Continuous <Continuous>  enoxaparin Injectable 40 milliGRAM(s) SubCutaneous daily  famotidine    Tablet 20 milliGRAM(s) Oral daily  fentaNYL   Patch  12 MICROgram(s)/Hr 1 Patch Transdermal every 72 hours  glucagon  Injectable 1 milliGRAM(s) IntraMuscular once PRN  hydrALAZINE Injectable 10 milliGRAM(s) IV Push every 4 hours PRN  insulin lispro (ADMELOG) corrective regimen sliding scale   SubCutaneous every 6 hours  ipratropium 17 MICROgram(s) HFA Inhaler 4 Puff(s) Inhalation every 6 hours  lactobacillus acidophilus 1 Tablet(s) Oral two times a day  psyllium Powder 1 Packet(s) Oral at bedtime  saline laxative (FLEET) Rectal Enema 1 Enema Rectal at bedtime  sucralfate 1 Gram(s) Oral two times a day      Vital Signs Last 24 Hrs  T(C): 36 (03 Nov 2020 23:02), Max: 37.1 (03 Nov 2020 11:25)  T(F): 96.8 (03 Nov 2020 23:02), Max: 98.8 (03 Nov 2020 11:25)  HR: 79 (04 Nov 2020 00:00) (63 - 86)  BP: 135/63 (04 Nov 2020 00:00) (126/62 - 173/74)  BP(mean): 90 (04 Nov 2020 00:00) (88 - 111)  RR: 21 (04 Nov 2020 00:00) (14 - 24)  SpO2: 100% (04 Nov 2020 00:00) (98% - 100%)      11-02-20 @ 07:01  -  11-03-20 @ 07:00  --------------------------------------------------------  IN: 1650 mL / OUT: 2040 mL / NET: -390 mL    11-03-20 @ 07:01  -  11-04-20 @ 00:45  --------------------------------------------------------  IN: 600 mL / OUT: 1115 mL / NET: -515 mL        Mode: VC+, RR (machine): 14, TV (machine): 400, FiO2: 30, PEEP: 5, ITime: 1, MAP: 8.7, PIP: 18    LABS:                        11.4   7.22  )-----------( 212      ( 03 Nov 2020 05:43 )             36.5     11-03    147<H>  |  113<H>  |  16  ----------------------------<  155<H>  4.2   |  28  |  1.00    Ca    9.6      03 Nov 2020 05:43  Phos  5.1     11-03  Mg     2.3     11-03    TPro  7.0  /  Alb  2.8<L>  /  TBili  0.4  /  DBili  x   /  AST  26  /  ALT  31  /  AlkPhos  79  11-03              WBC:  WBC Count: 7.22 K/uL (11-03 @ 05:43)  WBC Count: 7.82 K/uL (11-02 @ 05:42)  WBC Count: 11.74 K/uL (11-01 @ 05:46)  WBC Count: 6.31 K/uL (10-31 @ 05:07)      MICROBIOLOGY:  RECENT CULTURES:  10-31 .Sputum Sputum Pseudomonas aeruginosa   Few Squamous epithelial cells per low power field  Few polymorphonuclear leukocytes per low power field  Rare Yeast like cells per oil power field  Rare Gram Negative Rods per oil power field   Mixed gram negative rods Culture includes  Few Pseudomonas aeruginosa  Normal Respiratory Elvira present    10-30 .Blood Blood-Peripheral Proteus mirabilis XXXX   No growth to date.                    Sodium:  Sodium, Serum: 147 mmol/L (11-03 @ 05:43)  Sodium, Serum: 143 mmol/L (11-02 @ 05:42)  Sodium, Serum: 147 mmol/L (11-01 @ 05:46)  Sodium, Serum: 149 mmol/L (10-31 @ 05:07)      1.00 mg/dL 11-03 @ 05:43  0.95 mg/dL 11-02 @ 05:42  1.00 mg/dL 11-01 @ 05:46  1.00 mg/dL 10-31 @ 05:07      Hemoglobin:  Hemoglobin: 11.4 g/dL (11-03 @ 05:43)  Hemoglobin: 11.1 g/dL (11-02 @ 05:42)  Hemoglobin: 11.3 g/dL (11-01 @ 05:46)  Hemoglobin: 10.0 g/dL (10-31 @ 05:07)      Platelets: Platelet Count - Automated: 212 K/uL (11-03 @ 05:43)  Platelet Count - Automated: 225 K/uL (11-02 @ 05:42)  Platelet Count - Automated: 197 K/uL (11-01 @ 05:46)  Platelet Count - Automated: 156 K/uL (10-31 @ 05:07)      LIVER FUNCTIONS - ( 03 Nov 2020 05:43 )  Alb: 2.8 g/dL / Pro: 7.0 g/dL / ALK PHOS: 79 U/L / ALT: 31 U/L / AST: 26 U/L / GGT: x                 RADIOLOGY & ADDITIONAL STUDIES:

## 2020-11-04 NOTE — PROGRESS NOTE ADULT - SUBJECTIVE AND OBJECTIVE BOX
PROGRESS NOTE  Patient is a 74y old  Female who presents with a chief complaint of SOB (02 Nov 2020 17:45)  Chart and available morning labs /imaging are reviewed electronically , urgent issues addressed . More information  is being added upon completion of rounds , when more information is collected and management discussed with consultants , medical staff and social service/case management on the floor   LATE ENTRY- patient was seen and examined earlier today . Plan of care was discussed with med staff and unit coordinator .   OVERNIGHT  No new issues reported by medical staff . All above noted     HPI:  75 yo Female sent from [facility] with respiratory distress. No report of fever or hypoxia. Patient trached/vented ,s/p peg  and non-verbal, unable to contribute to history. Transferred to [place] from [place] ED for admission Admitted for septic workup and evaluation ,send blood and urine cx,serial lactate levels,monitor vitals closely hydration,monitor urine output and renal profile, iv abx initiated -s/p vanco and zosyn . BP stable upon [place] ED arrival Med hx is significant for Advanced dementia ,s/p  Aphasic stroke    Constipation  ,COVID-19   ,CVA (cerebral vascular accident)  ,Dementia of frontal lobe type  ,Diabetes mellitus  ,DM (diabetes mellitus)  ,GERD (gastroesophageal reflux disease)    HTN (hypertension)  ,Hypertension  ,Pneumonia  ,Quadriplegia  ,Respiratory failure ,s/p tracheostomy and peg .Found to have RYAN ,hypernatremia and lactate elevated Admit for iv hydration,monitor renal profile and urine output,nutritionist consult,prealbumin level,serial bmp,nutritional supplements, Palliative care consult requested ,to discuss advance directives and complete MOLST  (30 Oct 2020 07:14)    PAST MEDICAL & SURGICAL HISTORY:  Pneumonia    Quadriplegia    COVID-19 virus detected    Advanced dementia    DM (diabetes mellitus)    HTN (hypertension)    CVA (cerebral vascular accident)    Respiratory failure    Constipation    GERD (gastroesophageal reflux disease)    Hypertension    Respiratory failure    Diabetes mellitus    Aphasic stroke    Dementia of frontal lobe type    Feeding by G-tube    Tracheostomy tube present    Tracheostomy in place    Gastrostomy in place    Hx of appendectomy        MEDICATIONS  (STANDING):  ALBUTerol    90 MICROgram(s) HFA Inhaler 4 Puff(s) Inhalation every 6 hours  aspirin  chewable 81 milliGRAM(s) Oral daily  calcium carbonate 1250 mG  + Vitamin D (OsCal 500 + D) 1 Tablet(s) Oral two times a day  cefTRIAXone   IVPB 1000 milliGRAM(s) IV Intermittent every 24 hours  chlorhexidine 2% Cloths 1 Application(s) Topical daily  dextrose 5%. 1000 milliLiter(s) (50 mL/Hr) IV Continuous <Continuous>  enoxaparin Injectable 40 milliGRAM(s) SubCutaneous daily  famotidine    Tablet 20 milliGRAM(s) Oral daily  fentaNYL   Patch  12 MICROgram(s)/Hr 1 Patch Transdermal every 72 hours  insulin lispro (ADMELOG) corrective regimen sliding scale   SubCutaneous every 6 hours  ipratropium 17 MICROgram(s) HFA Inhaler 4 Puff(s) Inhalation every 6 hours  lactobacillus acidophilus 1 Tablet(s) Oral two times a day  saline laxative (FLEET) Rectal Enema 1 Enema Rectal at bedtime  sucralfate 1 Gram(s) Oral two times a day    MEDICATIONS  (PRN):  atropine 1% Ophthalmic Solution for SubLingual Use 1 Drop(s) SubLingual three times a day PRN salicary secretion  dextrose 40% Gel 15 Gram(s) Oral once PRN Blood Glucose LESS THAN 70 milliGRAM(s)/deciliter  glucagon  Injectable 1 milliGRAM(s) IntraMuscular once PRN Glucose LESS THAN 70 milligrams/deciliter      OBJECTIVE    T(C): 37.1 (11-04-20 @ 15:43), Max: 37.2 (11-04-20 @ 12:11)  HR: 74 (11-04-20 @ 18:00) (66 - 88)  BP: 124/60 (11-04-20 @ 18:00) (120/55 - 159/69)  RR: 16 (11-04-20 @ 18:00) (14 - 24)  SpO2: 100% (11-04-20 @ 18:00) (99% - 100%)  Wt(kg): --  I&O's Summary    03 Nov 2020 07:01  -  04 Nov 2020 07:00  --------------------------------------------------------  IN: 1080 mL / OUT: 1355 mL / NET: -275 mL    04 Nov 2020 07:01  -  04 Nov 2020 18:38  --------------------------------------------------------  IN: 1360 mL / OUT: 450 mL / NET: 910 mL    NOTE In accordance with  [facility] policy ICU final medical management decisions/MD orders are  determined/done only by ICU Intensivists       REVIEW OF SYSTEMS:  unobtainbale due to tracheostomy status     PHYSICAL EXAM:  Appearance: NAD. VS past 24 hrs -as above   HEENT:   Moist oral mucosa. Conjunctiva clear b/l.   Neck : supple s/p trach   Respiratory: Lungs diminished bs at bases   Gastrointestinal:  Soft, nontender. No rebound. No rigidity. BS present	  Cardiovascular: RRR ,S1S2 present  Neurologic: Non-focal. Moving all extremities.  Extremities: No edema. No erythema. No calf tenderness.  Skin: No rashes, No ecchymoses, No cyanosis.	  wounds ,skin lesions-See skin assesment flow sheet   LABS:                        10.5   7.33  )-----------( 207      ( 04 Nov 2020 08:45 )             32.5     11-04    146<H>  |  111<H>  |  16  ----------------------------<  181<H>  3.6   |  29  |  0.91    Ca    9.3      04 Nov 2020 08:45  Phos  4.3     11-04  Mg     2.4     11-04    TPro  7.0  /  Alb  2.8<L>  /  TBili  0.4  /  DBili  x   /  AST  26  /  ALT  31  /  AlkPhos  79  11-03    CAPILLARY BLOOD GLUCOSE      POCT Blood Glucose.: 157 mg/dL (04 Nov 2020 17:29)  POCT Blood Glucose.: 206 mg/dL (04 Nov 2020 11:56)  POCT Blood Glucose.: 143 mg/dL (04 Nov 2020 05:47)  POCT Blood Glucose.: 183 mg/dL (03 Nov 2020 23:38)          Culture - Sputum (collected 31 Oct 2020 01:25)  Source: .Sputum Sputum  Gram Stain (31 Oct 2020 05:02):    Few Squamous epithelial cells per low power field    Few polymorphonuclear leukocytes per low power field    Rare Yeast like cells per oil power field    Rare Gram Negative Rods per oil power field  Final Report (02 Nov 2020 17:02):    Mixed gram negative rods Culture includes    Few Pseudomonas aeruginosa    Normal Respiratory Elvira present  Organism: Pseudomonas aeruginosa (02 Nov 2020 17:02)  Organism: Pseudomonas aeruginosa (02 Nov 2020 17:02)    Culture - Urine (collected 30 Oct 2020 13:18)  Source: .Urine Clean Catch (Midstream)  Final Report (01 Nov 2020 19:08):    >100,000 CFU/ml Proteus mirabilis  Organism: Proteus mirabilis (01 Nov 2020 19:08)  Organism: Proteus mirabilis (01 Nov 2020 19:08)    Culture - Blood (collected 30 Oct 2020 13:18)  Source: .Blood Blood-Peripheral  Final Report (04 Nov 2020 13:00):    No Growth Final    Culture - Blood (collected 30 Oct 2020 13:18)  Source: .Blood Blood-Peripheral  Final Report (04 Nov 2020 13:00):    No Growth Final      RADIOLOGY & ADDITIONAL TESTS:   reviewed elctronically  ASSESSMENT/PLAN: 	  25minutes spent on this visit, 50% visit time spent in care co-ordination with other attendings and counselling patient  I have discussed care plan with patient and HCP, expressed understanding of problems treatment and their effect and side effects, alternatives in detail,I have asked if they have any questions and concerns and appropriately addressed them to best of my ability

## 2020-11-04 NOTE — PROGRESS NOTE ADULT - ASSESSMENT
75 yo Female sent from [facility] with respiratory distress. No report of fever or hypoxia. Patient trached/vented ,s/p peg  and non-verbal, unable to contribute to history. Transferred to [place] from [place] ED for admission Admitted for septic workup and evaluation ,send blood and urine cx,serial lactate levels,monitor vitals closely hydration,monitor urine output and renal profile, iv abx initiated -s/p vanco and zosyn . BP stable upon [place] ED arrival Med hx is significant for Advanced dementia ,s/p  Aphasic stroke    Constipation  ,COVID-19   ,CVA (cerebral vascular accident)  ,Dementia of frontal lobe type  ,Diabetes mellitus  ,DM (diabetes mellitus)  ,GERD (gastroesophageal reflux disease)    HTN (hypertension)  ,Hypertension  ,Pneumonia  ,Quadriplegia  ,Respiratory failure s/p tracheostomy and peg .Found to have RYAN ,hypernatremia and lactate elevated Admit for iv hydration,monitor renal profile and urine output,nutritionist consult,prealbumin level,serial bmp,nutritional supplements Palliative care consult requested ,to discuss advance directives and complete MOLST

## 2020-11-04 NOTE — PROGRESS NOTE ADULT - SUBJECTIVE AND OBJECTIVE BOX
[facility] DIVISION of INFECTIOUS DISEASE  Arturo Olivas MD PhD, Haylee Umanzor MD, Andressa Brantley MD, Billy Valenzuela MD  and providing coverage with Alexa Canas MD and Eusebio Chairez MD  Providing Infectious Disease Consultations at [facility], [facility]    Office# [phone] to schedule follow up appointments  Answering Service for urgent calls or New Consults [phone]  Cell# to text for urgent issues Arturo Olivas [phone]     infectious diseases progress note:    BREA BECKHAM is a 74y y. o. Female patient    No concerning overnight events    Allergies    codeine (Hives)    Intolerance      ANTIBIOTICS/RELEVANT:  antimicrobials  cefTRIAXone   IVPB 1000 milliGRAM(s) IV Intermittent every 24 hours    immunologic:    OTHER:  ALBUTerol    90 MICROgram(s) HFA Inhaler 4 Puff(s) Inhalation every 6 hours  aspirin  chewable 81 milliGRAM(s) Oral daily  atropine 1% Ophthalmic Solution for SubLingual Use 1 Drop(s) SubLingual three times a day PRN  calcium carbonate 1250 mG  + Vitamin D (OsCal 500 + D) 1 Tablet(s) Oral two times a day  chlorhexidine 2% Cloths 1 Application(s) Topical daily  dextrose 40% Gel 15 Gram(s) Oral once PRN  dextrose 5%. 1000 milliLiter(s) IV Continuous <Continuous>  enoxaparin Injectable 40 milliGRAM(s) SubCutaneous daily  famotidine    Tablet 20 milliGRAM(s) Oral daily  fentaNYL   Patch  12 MICROgram(s)/Hr 1 Patch Transdermal every 72 hours  glucagon  Injectable 1 milliGRAM(s) IntraMuscular once PRN  hydrALAZINE Injectable 10 milliGRAM(s) IV Push every 4 hours PRN  insulin lispro (ADMELOG) corrective regimen sliding scale   SubCutaneous every 6 hours  ipratropium 17 MICROgram(s) HFA Inhaler 4 Puff(s) Inhalation every 6 hours  lactobacillus acidophilus 1 Tablet(s) Oral two times a day  psyllium Powder 1 Packet(s) Oral at bedtime  saline laxative (FLEET) Rectal Enema 1 Enema Rectal at bedtime  sucralfate 1 Gram(s) Oral two times a day      Objective:  Vital Signs Last 24 Hrs  T(C): 37.1 (04 Nov 2020 07:21), Max: 37.1 (03 Nov 2020 11:25)  T(F): 98.7 (04 Nov 2020 07:21), Max: 98.8 (03 Nov 2020 11:25)  HR: 69 (04 Nov 2020 08:10) (63 - 88)  BP: 136/61 (04 Nov 2020 08:00) (120/55 - 166/72)  BP(mean): 88 (04 Nov 2020 08:00) (83 - 104)  RR: 16 (04 Nov 2020 08:00) (14 - 24)  SpO2: 100% (04 Nov 2020 08:10) (99% - 100%)    T(C): 37.1 (11-04-20 @ 07:21), Max: 37.2 (11-02-20 @ 20:02)  T(C): 37.1 (11-04-20 @ 07:21), Max: 37.2 (11-01-20 @ 23:17)  T(C): 37.1 (11-04-20 @ 07:21), Max: 37.2 (11-01-20 @ 23:17)    PHYSICAL EXAM:  HEENT: NC atraumatic  Neck: supple: remains intubated  Respiratory: no accessory muscle use, breathing comfortably  Cardiovascular: distant  Gastrointestinal: normal appearing, nondistended  Extremities: no clubbing, no cyanosis,      LABS:                          10.5   7.33  )-----------( 207      ( 04 Nov 2020 08:45 )             32.5       7.33 11-04 @ 08:45  7.22 11-03 @ 05:43  7.82 11-02 @ 05:42  11.74 11-01 @ 05:46  6.31 10-31 @ 05:07  10.22 10-30 @ 06:48  20.99 10-29 @ 22:39      11-03    147<H>  |  113<H>  |  16  ----------------------------<  155<H>  4.2   |  28  |  1.00    Ca    9.6      03 Nov 2020 05:43  Phos  5.1     11-03  Mg     2.3     11-03    TPro  7.0  /  Alb  2.8<L>  /  TBili  0.4  /  DBili  x   /  AST  26  /  ALT  31  /  AlkPhos  79  11-03      Creatinine, Serum: 1.00 mg/dL (11-03-20 @ 05:43)  Creatinine, Serum: 0.95 mg/dL (11-02-20 @ 05:42)  Creatinine, Serum: 1.00 mg/dL (11-01-20 @ 05:46)  Creatinine, Serum: 1.00 mg/dL (10-31-20 @ 05:07)  Creatinine, Serum: 1.40 mg/dL (10-30-20 @ 06:48)  Creatinine, Serum: 2.29 mg/dL (10-29-20 @ 22:39)                INFLAMMATORY MARKERS  Auto Neutrophil #: 4.66 K/uL (11-04-20 @ 08:45)  Auto Lymphocyte #: 1.92 K/uL (11-04-20 @ 08:45)  Auto Neutrophil #: 4.24 K/uL (11-03-20 @ 05:43)  Auto Lymphocyte #: 2.02 K/uL (11-03-20 @ 05:43)  Auto Neutrophil #: 4.83 K/uL (11-02-20 @ 05:42)  Auto Lymphocyte #: 2.06 K/uL (11-02-20 @ 05:42)  Auto Neutrophil #: 8.21 K/uL (11-01-20 @ 05:46)  Auto Lymphocyte #: 2.20 K/uL (11-01-20 @ 05:46)  Auto Neutrophil #: 3.62 K/uL (10-31-20 @ 05:07)  Auto Lymphocyte #: 1.88 K/uL (10-31-20 @ 05:07)  Auto Neutrophil #: 16.96 K/uL (10-29-20 @ 22:39)  Auto Lymphocyte #: 2.29 K/uL (10-29-20 @ 22:39)    Lactate, Blood: 2.8 mmol/L (10-31-20 @ 05:07)  Lactate, Blood: 3.5 mmol/L (10-30-20 @ 14:23)  Lactate, Blood: 3.8 mmol/L (10-30-20 @ 06:49)  Lactate, Blood: 7.2 mmol/L (10-30-20 @ 00:22)  Lactate, Blood: 8.7 mmol/L (10-29-20 @ 22:39)    Auto Eosinophil #: 0.10 K/uL (11-04-20 @ 08:45)  Auto Eosinophil #: 0.16 K/uL (11-03-20 @ 05:43)  Auto Eosinophil #: 0.12 K/uL (11-02-20 @ 05:42)  Auto Eosinophil #: 0.08 K/uL (11-01-20 @ 05:46)  Auto Eosinophil #: 0.17 K/uL (10-31-20 @ 05:07)  Auto Eosinophil #: 0.01 K/uL (10-29-20 @ 22:39)          Troponin I, Serum: .066 ng/mL (10-30-20 @ 14:23)  Troponin I, Serum: .086 ng/mL (10-30-20 @ 06:48)                Activated Partial Thromboplastin Time: 43.7 sec (10-29-20 @ 22:39)  INR: 1.08 ratio (10-29-20 @ 22:39)          MICROBIOLOGY:              RADIOLOGY & ADDITIONAL STUDIES:

## 2020-11-04 NOTE — PROGRESS NOTE ADULT - SUBJECTIVE AND OBJECTIVE BOX
Date/Time Patient Seen:  		  Referring MD:   Data Reviewed	       Patient is a 74y old  Female who presents with a chief complaint of SOB (02 Nov 2020 17:45)      Subjective/HPI     PAST MEDICAL & SURGICAL HISTORY:  Pneumonia    Quadriplegia    COVID-19 virus detected    Advanced dementia    DM (diabetes mellitus)    HTN (hypertension)    CVA (cerebral vascular accident)    Respiratory failure    Constipation    GERD (gastroesophageal reflux disease)    Hypertension    Respiratory failure    Diabetes mellitus    Aphasic stroke    Dementia of frontal lobe type    Feeding by G-tube    Tracheostomy tube present    Tracheostomy in place    Gastrostomy in place    Hx of appendectomy          Medication list         MEDICATIONS  (STANDING):  ALBUTerol    90 MICROgram(s) HFA Inhaler 4 Puff(s) Inhalation every 6 hours  aspirin  chewable 81 milliGRAM(s) Oral daily  calcium carbonate 1250 mG  + Vitamin D (OsCal 500 + D) 1 Tablet(s) Oral two times a day  cefTRIAXone   IVPB 1000 milliGRAM(s) IV Intermittent every 24 hours  chlorhexidine 2% Cloths 1 Application(s) Topical daily  dextrose 5%. 1000 milliLiter(s) (50 mL/Hr) IV Continuous <Continuous>  enoxaparin Injectable 40 milliGRAM(s) SubCutaneous daily  famotidine    Tablet 20 milliGRAM(s) Oral daily  fentaNYL   Patch  12 MICROgram(s)/Hr 1 Patch Transdermal every 72 hours  insulin lispro (ADMELOG) corrective regimen sliding scale   SubCutaneous every 6 hours  ipratropium 17 MICROgram(s) HFA Inhaler 4 Puff(s) Inhalation every 6 hours  lactobacillus acidophilus 1 Tablet(s) Oral two times a day  psyllium Powder 1 Packet(s) Oral at bedtime  saline laxative (FLEET) Rectal Enema 1 Enema Rectal at bedtime  sucralfate 1 Gram(s) Oral two times a day    MEDICATIONS  (PRN):  atropine 1% Ophthalmic Solution for SubLingual Use 1 Drop(s) SubLingual three times a day PRN salicary secretion  dextrose 40% Gel 15 Gram(s) Oral once PRN Blood Glucose LESS THAN 70 milliGRAM(s)/deciliter  glucagon  Injectable 1 milliGRAM(s) IntraMuscular once PRN Glucose LESS THAN 70 milligrams/deciliter  hydrALAZINE Injectable 10 milliGRAM(s) IV Push every 4 hours PRN SBP > 150         Vitals log        ICU Vital Signs Last 24 Hrs  T(C): 36.8 (04 Nov 2020 04:56), Max: 37.1 (03 Nov 2020 11:25)  T(F): 98.2 (04 Nov 2020 04:56), Max: 98.8 (03 Nov 2020 11:25)  HR: 70 (04 Nov 2020 06:00) (63 - 88)  BP: 124/58 (04 Nov 2020 06:00) (120/55 - 173/74)  BP(mean): 83 (04 Nov 2020 06:00) (83 - 111)  ABP: --  ABP(mean): --  RR: 20 (04 Nov 2020 06:00) (14 - 24)  SpO2: 100% (04 Nov 2020 06:00) (99% - 100%)       Mode: vc +  RR (machine): 14  TV (machine): 400  FiO2: 30  PEEP: 5  ITime: 1  MAP: 8.8  PIP: 22      Input and Output:  I&O's Detail    02 Nov 2020 07:01  -  03 Nov 2020 07:00  --------------------------------------------------------  IN:    Free Water: 750 mL    Glucerna: 900 mL  Total IN: 1650 mL    OUT:    Indwelling Catheter - Urethral (mL): 2040 mL  Total OUT: 2040 mL    Total NET: -390 mL      03 Nov 2020 07:01  -  04 Nov 2020 06:49  --------------------------------------------------------  IN:    Enteral Tube Flush: 250 mL    Free Water: 330 mL    Glucerna: 500 mL  Total IN: 1080 mL    OUT:    Indwelling Catheter - Urethral (mL): 1315 mL  Total OUT: 1315 mL    Total NET: -235 mL          Lab Data                        11.4   7.22  )-----------( 212      ( 03 Nov 2020 05:43 )             36.5     11-03    147<H>  |  113<H>  |  16  ----------------------------<  155<H>  4.2   |  28  |  1.00    Ca    9.6      03 Nov 2020 05:43  Phos  5.1     11-03  Mg     2.3     11-03    TPro  7.0  /  Alb  2.8<L>  /  TBili  0.4  /  DBili  x   /  AST  26  /  ALT  31  /  AlkPhos  79  11-03            Review of Systems	      Objective     Physical Examination    heart s1s2  lung dec BS  abd soft      Pertinent Lab findings & Imaging      Annalise:  NO   Adequate UO     I&O's Detail    02 Nov 2020 07:01  -  03 Nov 2020 07:00  --------------------------------------------------------  IN:    Free Water: 750 mL    Glucerna: 900 mL  Total IN: 1650 mL    OUT:    Indwelling Catheter - Urethral (mL): 2040 mL  Total OUT: 2040 mL    Total NET: -390 mL      03 Nov 2020 07:01  -  04 Nov 2020 06:49  --------------------------------------------------------  IN:    Enteral Tube Flush: 250 mL    Free Water: 330 mL    Glucerna: 500 mL  Total IN: 1080 mL    OUT:    Indwelling Catheter - Urethral (mL): 1315 mL  Total OUT: 1315 mL    Total NET: -235 mL               Discussed with:     Cultures:	        Radiology

## 2020-11-04 NOTE — PROGRESS NOTE ADULT - ASSESSMENT
cont rx   cont rx      [facility]ASHARAMI [facility]  DOA 10/30/2020 DR ILIANA KEMP       REVIEW OF SYMPTOMS      Able to give ROS  NO     PHYSICAL EXAM    HEENT Unremarkable PERRLA atraumatic   RESP Fair air entry EXP prolonged    Harsh breath sound Resp distres mild   CARDIAC S1 S2 No S3     NO JVD    ABDOMEN SOFT BS PRESENT NOT DISTENDED No hepatosplenomegaly PEDAL EDEMA present No calf tenderness  NO rash     PT DATA/BEST PRACTICE  ALLERGY        codeine       WT                             10/30/2020 56   BMI                                  10/30/2020 22      ADVANCED DIRECTIVE     HEAD OF BED ELEVATION Yes      DVT PROPHYLAXIS.   LVNX 30 (10/30) --> lvnx 40 (10/31)   DIET. glucerna 1.5 1000 (10/3)   FREE WATER 250.4 (11/1) (Dr NICHOLAS)                                                                 SQUIRES PROPHYLAXIS. FAMOTIDINE 20 (10/30)  INFECTION PPLX          chlorhexidine 4% (10/30)                                           OXYGENATION.         VITALS.   11/4/2020 afeb 80 130/60   11/4/2020 14/400/5/.3      LABS.   W 10/29-10/30-10/31-11/1-11/4/2020 w 20.9-10.2 -6.3-11.7 -  7.3  la 10/29-10/30-10/31 la 8.7-3.8 -2.8  Tr 10/30/2020 Tr .086   BNP 10/30/2020 1346   Hb 10/29-10/30-10/31-11/4/2020 Hb 13.2-11.6-10 - 10.5  inr 10/29 inr 1   Na 10/29-10/30-10/31 -11/1-11/3 - 11/4 Na 146-150 -149 - 147 - 147 - 146  Cr 10/29-10/30-10/31-11/1-11/2 -11/4 Cr 2.2-1.4 -1-1 - .9            PATIENT SUMMARY.   74 yr female hx of dm, quadaplegic,  resp failure on chronic vent , htn, peg, CVA, advanced dementia, recent admission for +++COVID , d/c to vent facility with neg covid, now with resp distress, sepsis, hypotensive  , most likely UTI  sepsis   Pulm consulted 10/30    PROBLEM/ASSESSMENT/RECOMMENDATIONS.  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

## 2020-11-04 NOTE — PROGRESS NOTE ADULT - SUBJECTIVE AND OBJECTIVE BOX
[facility] [place]       HPI:  73 yo Female sent from [facility] with respiratory distress. No report of fever or hypoxia. Patient trached/vented ,s/p peg  and non-verbal, unable to contribute to history. Transferred to [facility] from [facility] for admission Admitted for septic workup and evaluation ,send blood and urine cx,serial lactate levels,monitor vitals closely hydration,monitor urine output and renal profile, iv abx initiated -s/p vanco and zosyn . BP stable upon [facility] ED arrival Med hx is significant for Advanced dementia ,s/p  Aphasic stroke    Constipation  ,COVID-19   ,CVA (cerebral vascular accident)  ,Dementia of frontal lobe type  ,Diabetes mellitus  ,DM (diabetes mellitus)  ,GERD (gastroesophageal reflux disease)    HTN (hypertension)  ,Hypertension  ,Pneumonia  ,Quadriplegia  ,Respiratory failure ,s/p tracheostomy and peg .Found to have RYAN ,hypernatremia and lactate elevated Admit for iv hydration,monitor renal profile and urine output,nutritionist consult,prealbumin level,serial bmp,nutritional supplements, Palliative care consult requested ,to discuss advance directives and complete MOLST  (30 Oct 2020 07:14)        SUBJECTIVE:      ALLERGIES:  Allergies    codeine (Hives)    Intolerances          MEDICATIONS  (STANDING):  ALBUTerol    90 MICROgram(s) HFA Inhaler 4 Puff(s) Inhalation every 6 hours  aspirin  chewable 81 milliGRAM(s) Oral daily  calcium carbonate 1250 mG  + Vitamin D (OsCal 500 + D) 1 Tablet(s) Oral two times a day  cefTRIAXone   IVPB 1000 milliGRAM(s) IV Intermittent every 24 hours  chlorhexidine 2% Cloths 1 Application(s) Topical daily  dextrose 5%. 1000 milliLiter(s) (50 mL/Hr) IV Continuous <Continuous>  enoxaparin Injectable 40 milliGRAM(s) SubCutaneous daily  famotidine    Tablet 20 milliGRAM(s) Oral daily  fentaNYL   Patch  12 MICROgram(s)/Hr 1 Patch Transdermal every 72 hours  insulin lispro (ADMELOG) corrective regimen sliding scale   SubCutaneous every 6 hours  ipratropium 17 MICROgram(s) HFA Inhaler 4 Puff(s) Inhalation every 6 hours  lactobacillus acidophilus 1 Tablet(s) Oral two times a day  saline laxative (FLEET) Rectal Enema 1 Enema Rectal at bedtime  sucralfate 1 Gram(s) Oral two times a day    MEDICATIONS  (PRN):  atropine 1% Ophthalmic Solution for SubLingual Use 1 Drop(s) SubLingual three times a day PRN salicary secretion  dextrose 40% Gel 15 Gram(s) Oral once PRN Blood Glucose LESS THAN 70 milliGRAM(s)/deciliter  glucagon  Injectable 1 milliGRAM(s) IntraMuscular once PRN Glucose LESS THAN 70 milligrams/deciliter      REVIEW OF SYSTEMS:  CONSTITUTIONAL: No fever,  RESPIRATORY: No cough, wheezing, shortness of breath  CARDIOVASCULAR: No chest pain, dyspnea, palpitations, dizziness, syncope, paroxysmal nocturnal dyspnea, orthopnea, or arm or leg swelling  GASTROINTESTINAL: No abdominal  or epigastric pain, nausea, vomiting,  diarrhea  NEUROLOGICAL: No headaches,  loss of strength, numbness, or tremors    Vital Signs Last 24 Hrs  T(C): 37.2 (04 Nov 2020 20:30), Max: 37.2 (04 Nov 2020 12:11)  T(F): 98.9 (04 Nov 2020 20:30), Max: 99 (04 Nov 2020 12:11)  HR: 68 (04 Nov 2020 22:00) (66 - 88)  BP: 101/49 (04 Nov 2020 22:00) (101/49 - 159/69)  BP(mean): 71 (04 Nov 2020 22:00) (71 - 101)  RR: 14 (04 Nov 2020 22:00) (14 - 24)  SpO2: 100% (04 Nov 2020 22:00) (100% - 100%)    PHYSICAL EXAM:  HEAD:  Atraumatic, Normocephalic  NECK: Supple and normal thyroid.  No JVD or carotid bruit.   HEART: S1, S2 regular , 1/6 soft ejection systolic murmur in mitral area , no thrill and no gallops .  PULMONARY: Bilateral vesicular breathing , few scattered ronchi and few scattered rales are present .  ABDOMEN: Soft nontender and positive bowl sounds   EXTREMITIES:  No clubbing, cyanosis, or pedal  edema  NEUROLOGICAL: AAOX3 , no focal deficit .    LABS:                        10.5   7.33  )-----------( 207      ( 04 Nov 2020 08:45 )             32.5     11-04    146<H>  |  111<H>  |  16  ----------------------------<  181<H>  3.6   |  29  |  0.91    Ca    9.3      04 Nov 2020 08:45  Phos  4.3     11-04  Mg     2.4     11-04    TPro  7.0  /  Alb  2.8<L>  /  TBili  0.4  /  DBili  x   /  AST  26  /  ALT  31  /  AlkPhos  79  11-03            BNP      EKG:  ECHO:  IMAGING:    Assessment/Plan    Will continue to follow during hospital course and give further recommendations as needed . Thanks for your referral . if any questions please contact me at office [phone] [phone])

## 2020-11-04 NOTE — PROGRESS NOTE ADULT - SUBJECTIVE AND OBJECTIVE BOX
Patient is a 74y old  Female who presents with a chief complaint of SOB (02 Nov 2020 17:45)    24 hour events: ***    REVIEW OF SYSTEMS  Constitutional: No fever, chills, fatigue  Neuro: No headache, numbness, weakness  Resp: No cough, wheezing, shortness of breath  CVS: No chest pain, palpitations, leg swelling  GI: No abdominal pain, nausea, vomiting, diarrhea   : No dysuria, frequency, incontinence  Skin: No itching, burning, rashes, or lesions   Msk: No joint pain or swelling  Psych: No depression, anxiety, mood swings  Heme: No bleeding    T(F): 98.2 (11-04-20 @ 04:56), Max: 98.8 (11-03-20 @ 11:25)  HR: 73 (11-04-20 @ 07:00) (63 - 88)  BP: 145/62 (11-04-20 @ 07:00) (120/55 - 173/74)  RR: 15 (11-04-20 @ 07:00) (14 - 24)  SpO2: 100% (11-04-20 @ 07:00) (99% - 100%)  Wt(kg): --    Mode: vc +, RR (machine): 14, TV (machine): 400, FiO2: 30, PEEP: 5        I&O's Summary    11-03 @ 07:01  -  11-04 @ 07:00  --------------------------------------------------------  IN: 1080 mL / OUT: 1355 mL / NET: -275 mL      PHYSICAL EXAM  General:   CNS:   HEENT:   Resp:   CVS:   Abd:   Ext:   Skin:     MEDICATIONS  cefTRIAXone   IVPB IV Intermittent    hydrALAZINE Injectable IV Push PRN    dextrose 40% Gel Oral PRN  glucagon  Injectable IntraMuscular PRN  insulin lispro (ADMELOG) corrective regimen sliding scale SubCutaneous    ALBUTerol    90 MICROgram(s) HFA Inhaler Inhalation  ipratropium 17 MICROgram(s) HFA Inhaler Inhalation    fentaNYL   Patch  12 MICROgram(s)/Hr Transdermal      aspirin  chewable Oral  enoxaparin Injectable SubCutaneous    famotidine    Tablet Oral  psyllium Powder Oral  saline laxative (FLEET) Rectal Enema Rectal  sucralfate Oral      calcium carbonate 1250 mG  + Vitamin D (OsCal 500 + D) Oral  dextrose 5%. IV Continuous      atropine 1% Ophthalmic Solution for SubLingual Use SubLingual PRN  chlorhexidine 2% Cloths Topical    lactobacillus acidophilus Oral                          11.4   7.22  )-----------( 212      ( 03 Nov 2020 05:43 )             36.5       11-03    147<H>  |  113<H>  |  16  ----------------------------<  155<H>  4.2   |  28  |  1.00    Ca    9.6      03 Nov 2020 05:43  Phos  5.1     11-03  Mg     2.3     11-03    TPro  7.0  /  Alb  2.8<L>  /  TBili  0.4  /  DBili  x   /  AST  26  /  ALT  31  /  AlkPhos  79  11-03              .Sputum Sputum   Mixed gram negative rods Culture includes  Few Pseudomonas aeruginosa  Normal Respiratory Elvira present   Few Squamous epithelial cells per low power field  Few polymorphonuclear leukocytes per low power field  Rare Yeast like cells per oil power field  Rare Gram Negative Rods per oil power field 10-31 @ 01:25  .Blood Blood-Peripheral   No growth to date. -- 10-30 @ 13:18        Radiology: ***  Bedside lung ultrasound: ***  Bedside ECHO: ***    CENTRAL LINE: Y/N          DATE INSERTED:              REMOVE: Y/N  REID: Y/N                        DATE INSERTED:              REMOVE: Y/N  A-LINE: Y/N                       DATE INSERTED:              REMOVE: Y/N    GLOBAL ISSUE/BEST PRACTICE  Analgesia:   Sedation:   CAM-ICU:   HOB elevation: yes  Stress ulcer prophylaxis:   VTE prophylaxis:   Glycemic control:   Nutrition:     CODE STATUS: ***  [facility] discussion: Y       Patient is a 74y old  Female who presents with a chief complaint of SOB (02 Nov 2020 17:45)    24 hour events: no acute events overnight. Patient remained hemodynamically stable and afebrile overnight.    REVIEW OF SYSTEMS  unable to assess 2/2 mental status    T(F): 98.2 (11-04-20 @ 04:56), Max: 98.8 (11-03-20 @ 11:25)  HR: 73 (11-04-20 @ 07:00) (63 - 88)  BP: 145/62 (11-04-20 @ 07:00) (120/55 - 173/74)  RR: 15 (11-04-20 @ 07:00) (14 - 24)  SpO2: 100% (11-04-20 @ 07:00) (99% - 100%)  Wt(kg): --    Mode: vc +, RR (machine): 14, TV (machine): 400, FiO2: 30, PEEP: 5        I&O's Summary    11-03 @ 07:01  -  11-04 @ 07:00  --------------------------------------------------------  IN: 1080 mL / OUT: 1355 mL / NET: -275 mL      PHYSICAL EXAM  General: frail appearing woman, awake but unresponsive, intubated, trached  CNS: contracted bedbound, does not follow commands, withdraws from painful stimuli, opens eyes to name  HEENT: PERRL  Resp: CTABL, trache sight clean, no discharge  CVS: rrr no m/r/g  Abd: soft, nontender, mildly distended. PEG site clean, no erythema, discharge  Ext: No cyanosis, edema  Skin: warm & perfused    MEDICATIONS  cefTRIAXone   IVPB IV Intermittent  dextrose 40% Gel Oral PRN  glucagon  Injectable IntraMuscular PRN  insulin lispro (ADMELOG) corrective regimen sliding scale SubCutaneous  ALBUTerol    90 MICROgram(s) HFA Inhaler Inhalation  ipratropium 17 MICROgram(s) HFA Inhaler Inhalation  fentaNYL   Patch  12 MICROgram(s)/Hr Transdermal  aspirin  chewable Oral  enoxaparin Injectable SubCutaneous  famotidine    Tablet Oral  psyllium Powder Oral  saline laxative (FLEET) Rectal Enema Rectal  sucralfate Oral  calcium carbonate 1250 mG  + Vitamin D (OsCal 500 + D) Oral  dextrose 5%. IV Continuous  atropine 1% Ophthalmic Solution for SubLingual Use SubLingual PRN  chlorhexidine 2% Cloths Topical  lactobacillus acidophilus Oral                                     10.5   7.33  )-----------( 207      ( 04 Nov 2020 08:45 )             32.5       11-04    146<H>  |  111<H>  |  16  ----------------------------<  181<H>  3.6   |  29  |  0.91    Ca    9.3      04 Nov 2020 08:45  Phos  4.3     11-04  Mg     2.4     11-04    TPro  7.0  /  Alb  2.8<L>  /  TBili  0.4  /  DBili  x   /  AST  26  /  ALT  31  /  AlkPhos  79  11-03        .Sputum Sputum   Mixed gram negative rods Culture includes  Few Pseudomonas aeruginosa  Normal Respiratory Elvira present   Few Squamous epithelial cells per low power field  Few polymorphonuclear leukocytes per low power field  Rare Yeast like cells per oil power field  Rare Gram Negative Rods per oil power field 10-31 @ 01:25  .Blood Blood-Peripheral   No growth to date. -- 10-30 @ 13:18        Radiology: no new imaging    REID: Y   (chronic)                     GLOBAL ISSUE/BEST PRACTICE  Analgesia: Y  Sedation: N  HOB elevation: yes  Stress ulcer prophylaxis: Y  VTE prophylaxis: Y  Glycemic control: Y  Nutrition: glucerna through PEG    CODE STATUS: FULL  [facility] discussion: Y       Patient is a 74y old  Female who presents with a chief complaint of SOB (02 Nov 2020 17:45)    24 hour events: no acute events overnight. Patient remained hemodynamically stable and afebrile overnight.    REVIEW OF SYSTEMS  unable to assess 2/2 mental status    T(F): 98.2 (11-04-20 @ 04:56), Max: 98.8 (11-03-20 @ 11:25)  HR: 73 (11-04-20 @ 07:00) (63 - 88)  BP: 145/62 (11-04-20 @ 07:00) (120/55 - 173/74)  RR: 15 (11-04-20 @ 07:00) (14 - 24)  SpO2: 100% (11-04-20 @ 07:00) (99% - 100%)    Mode: vc +, RR (machine): 14, TV (machine): 400, FiO2: 30, PEEP: 5        I&O's Summary    11-03 @ 07:01  -  11-04 @ 07:00  --------------------------------------------------------  IN: 1080 mL / OUT: 1355 mL / NET: -275 mL      PHYSICAL EXAM  General: frail appearing woman, awake but unresponsive, intubated, trached  CNS: contracted bedbound, does not follow commands, withdraws from painful stimuli, opens eyes to name  HEENT: PERRL  Resp: CTABL, trache sight clean, no discharge  CVS: rrr no m/r/g  Abd: soft, nontender, mildly distended. PEG site clean, no erythema, discharge  Ext: No cyanosis, edema  Skin: warm & perfused    MEDICATIONS  cefTRIAXone   IVPB IV Intermittent  dextrose 40% Gel Oral PRN  glucagon  Injectable IntraMuscular PRN  insulin lispro (ADMELOG) corrective regimen sliding scale SubCutaneous  ALBUTerol    90 MICROgram(s) HFA Inhaler Inhalation  ipratropium 17 MICROgram(s) HFA Inhaler Inhalation  fentaNYL   Patch  12 MICROgram(s)/Hr Transdermal  aspirin  chewable Oral  enoxaparin Injectable SubCutaneous  famotidine    Tablet Oral  psyllium Powder Oral  saline laxative (FLEET) Rectal Enema Rectal  sucralfate Oral  calcium carbonate 1250 mG  + Vitamin D (OsCal 500 + D) Oral  dextrose 5%. IV Continuous  atropine 1% Ophthalmic Solution for SubLingual Use SubLingual PRN  chlorhexidine 2% Cloths Topical  lactobacillus acidophilus Oral                                     10.5   7.33  )-----------( 207      ( 04 Nov 2020 08:45 )             32.5       11-04    146<H>  |  111<H>  |  16  ----------------------------<  181<H>  3.6   |  29  |  0.91    Ca    9.3      04 Nov 2020 08:45  Phos  4.3     11-04  Mg     2.4     11-04    TPro  7.0  /  Alb  2.8<L>  /  TBili  0.4  /  DBili  x   /  AST  26  /  ALT  31  /  AlkPhos  79  11-03        .Sputum Sputum   Mixed gram negative rods Culture includes  Few Pseudomonas aeruginosa  Normal Respiratory Elvira present   Few Squamous epithelial cells per low power field  Few polymorphonuclear leukocytes per low power field  Rare Yeast like cells per oil power field  Rare Gram Negative Rods per oil power field 10-31 @ 01:25  .Blood Blood-Peripheral   No growth to date. -- 10-30 @ 13:18        Radiology: no new imaging    REID: Y   (chronic)                     GLOBAL ISSUE/BEST PRACTICE  Analgesia: Y  Sedation: N  HOB elevation: yes  Stress ulcer prophylaxis: Y  VTE prophylaxis: Y  Glycemic control: Y  Nutrition: glucerna through PEG    CODE STATUS: FULL  [facility] discussion: Y       Patient is a 74y old  Female who presents with a chief complaint of SOB (02 Nov 2020 17:45)    24 hour events: no acute events overnight.       REVIEW OF SYSTEMS  unable to assess 2/2 mental status    T(F): 98.2 (11-04-20 @ 04:56), Max: 98.8 (11-03-20 @ 11:25)  HR: 73 (11-04-20 @ 07:00) (63 - 88)  BP: 145/62 (11-04-20 @ 07:00) (120/55 - 173/74)  RR: 15 (11-04-20 @ 07:00) (14 - 24)  SpO2: 100% (11-04-20 @ 07:00) (99% - 100%)    Mode: vc +, RR (machine): 14, TV (machine): 400, FiO2: 30, PEEP: 5        I&O's Summary    11-03 @ 07:01  -  11-04 @ 07:00  --------------------------------------------------------  IN: 1080 mL / OUT: 1355 mL / NET: -275 mL      PHYSICAL EXAM  General: frail appearing woman, awake but unresponsive, intubated, trached  CNS: contracted bedbound, does not follow commands, withdraws from painful stimuli, opens eyes to name  HEENT: PERRL  Resp: CTABL, trache sight clean, no discharge  CVS: rrr no m/r/g  Abd: soft, nontender, mildly distended. PEG site clean, no erythema, discharge  Ext: No cyanosis, edema  Skin: warm & perfused    MEDICATIONS  cefTRIAXone   IVPB IV Intermittent  dextrose 40% Gel Oral PRN  glucagon  Injectable IntraMuscular PRN  insulin lispro (ADMELOG) corrective regimen sliding scale SubCutaneous  ALBUTerol    90 MICROgram(s) HFA Inhaler Inhalation  ipratropium 17 MICROgram(s) HFA Inhaler Inhalation  fentaNYL   Patch  12 MICROgram(s)/Hr Transdermal  aspirin  chewable Oral  enoxaparin Injectable SubCutaneous  famotidine    Tablet Oral  psyllium Powder Oral  saline laxative (FLEET) Rectal Enema Rectal  sucralfate Oral  calcium carbonate 1250 mG  + Vitamin D (OsCal 500 + D) Oral  dextrose 5%. IV Continuous  atropine 1% Ophthalmic Solution for SubLingual Use SubLingual PRN  chlorhexidine 2% Cloths Topical  lactobacillus acidophilus Oral                                     10.5   7.33  )-----------( 207      ( 04 Nov 2020 08:45 )             32.5       11-04    146<H>  |  111<H>  |  16  ----------------------------<  181<H>  3.6   |  29  |  0.91    Ca    9.3      04 Nov 2020 08:45  Phos  4.3     11-04  Mg     2.4     11-04    TPro  7.0  /  Alb  2.8<L>  /  TBili  0.4  /  DBili  x   /  AST  26  /  ALT  31  /  AlkPhos  79  11-03        .Sputum Sputum   Mixed gram negative rods Culture includes  Few Pseudomonas aeruginosa  Normal Respiratory Elvira present   Few Squamous epithelial cells per low power field  Few polymorphonuclear leukocytes per low power field  Rare Yeast like cells per oil power field  Rare Gram Negative Rods per oil power field 10-31 @ 01:25  .Blood Blood-Peripheral   No growth to date. -- 10-30 @ 13:18        Radiology: no new imaging    REID: Y   (chronic)                     GLOBAL ISSUE/BEST PRACTICE  Analgesia: Y  Sedation: N  HOB elevation: yes  Stress ulcer prophylaxis: Y  VTE prophylaxis: Y  Glycemic control: Y  Nutrition: glucerna through PEG    CODE STATUS: FULL

## 2020-11-04 NOTE — PROGRESS NOTE ADULT - ASSESSMENT
75 yo F PMH frontotemporal dementia, aphasic stroke, chronic trach & peg, bed bound, insulin dependent DM, covid in march, HTN, recent PNA admitted for severe sepsis 2/2 uti.    Neuro: no acute issues. c/w transdermal fentanyl patch  Cardio: hemodynamically stable. TTE shows EF 60-65%, Grade 1 LV diastolic dysfunction, MR, TR. ASA 81 daily through peg tube. Strict Is/Os  Pulm: continue with mechanical ventilation. CXR clear. continue albuterol & ipatropium 4 puffs q6 for secretions, tobramycin discontinued. 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  GI: PEG tube occlusions resolved. Glucerna feeds through peg. 250 cc free water q6 for hypernatremia. c/w fleet enema, famotidine, psyllium, sucralfate, and lactobillus  Renal: prerenal RYAN 2/2 sepsis with lactic acidosis - resolved with fluid administration. Chronic vaughn for urinary retention. Hypernatremia - likely 2/2 holding of free water due to PEG occlusion, cc free water q6 restarted  ID: Urosepsis - Ucx grew proteus resistant to nitrofurantoin, coverage switched from zosyn to ceftriaxone 1 g daily for a total of a 14 day course (last day 11/12), blood cultures NGTD, sputum culture with few psuedomonas, likely colonization  Endo: ISS for insulin dependent diabetes, HbA1C 6.6  heme: 40 mg SQ LVX DVT ppx 73 yo F PMH frontotemporal dementia, aphasic stroke, chronic trach & peg, bed bound, insulin dependent DM, covid in march, HTN, recent PNA admitted for severe sepsis 2/2 uti.    Neuro: no acute issues. c/w transdermal fentanyl patch  Cardio: hemodynamically stable. TTE shows EF 60-65%, Grade 1 LV diastolic dysfunction, MR, TR. ASA 81 daily through peg tube. Strict Is/Os  Pulm: continue with mechanical ventilation. CXR clear. continue albuterol & ipatropium 4 puffs q6 for secretions, tobramycin discontinued. 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  GI:  Psyllium d/c'ed as it keeps clogging PEG tube, switching to miralax. Glucerna feeds through peg. 250 cc free water q6 for hypernatremia. c/w fleet enema, famotidine, psyllium, sucralfate, and lactobillus  Renal: prerenal RYAN 2/2 sepsis with lactic acidosis - resolved with fluid administration. Chronic vaughn for urinary retention. Hypernatremia - likely 2/2 holding of free water due to PEG occlusion, cc free water q6 restarted  ID: Urosepsis - Ucx grew proteus resistant to nitrofurantoin, coverage switched from zosyn to ceftriaxone 1 g daily for a total of a 14 day course (last day 11/12), blood cultures NGTD, sputum culture with few psuedomonas, likely colonization  Endo: ISS for insulin dependent diabetes, HbA1C 6.6  heme: 40 mg SQ LVX DVT ppx 73 yo F PMH frontotemporal dementia, aphasic stroke, chronic trach & peg, bed bound, insulin dependent DM, covid in march, HTN, recent PNA admitted for severe sepsis 2/2 uti.    Neuro: no acute issues. c/w transdermal fentanyl patch  Cardio: hemodynamically stable. TTE shows EF 60-65%, Grade 1 LV diastolic dysfunction, MR, TR. ASA 81 daily through peg tube. Strict Is/Os  Pulm: continue with mechanical ventilation. CXR clear. continue albuterol & ipatropium 4 puffs q6 for secretions, tobramycin discontinued. 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  GI:  Psyllium d/c'ed as it keeps clogging PEG tube, switching to miralax. Glucerna feeds through peg. 250 cc free water q6 for hypernatremia. c/w fleet enema, famotidine, psyllium, sucralfate, and lactobillus  Renal: prerenal RYAN 2/2 sepsis with lactic acidosis - resolved with fluid administration. Chronic vaughn for urinary retention. Hypernatremia - 250 cc free water q6   ID: Urosepsis - Ucx grew proteus resistant to nitrofurantoin, coverage switched from zosyn to ceftriaxone 1 g daily for a total of a 14 day course (last day 11/12), blood cultures NGTD, sputum culture with few psuedomonas, likely colonization  Endo: ISS for insulin dependent diabetes, HbA1C 6.6  heme: 40 mg SQ LVX DVT ppx    Dispo: Pt is stable for transfer to floor

## 2020-11-05 ENCOUNTER — TRANSCRIPTION ENCOUNTER (OUTPATIENT)
Age: 75
End: 2020-11-05

## 2020-11-05 LAB
ALBUMIN SERPL ELPH-MCNC: 2.5 G/DL — LOW (ref 3.3–5)
ALP SERPL-CCNC: 68 U/L — SIGNIFICANT CHANGE UP (ref 40–120)
ALT FLD-CCNC: 27 U/L — SIGNIFICANT CHANGE UP (ref 12–78)
ANION GAP SERPL CALC-SCNC: 7 MMOL/L — SIGNIFICANT CHANGE UP (ref 5–17)
AST SERPL-CCNC: 18 U/L — SIGNIFICANT CHANGE UP (ref 15–37)
BASOPHILS # BLD AUTO: 0.02 K/UL — SIGNIFICANT CHANGE UP (ref 0–0.2)
BASOPHILS NFR BLD AUTO: 0.3 % — SIGNIFICANT CHANGE UP (ref 0–2)
BILIRUB SERPL-MCNC: 0.3 MG/DL — SIGNIFICANT CHANGE UP (ref 0.2–1.2)
BUN SERPL-MCNC: 20 MG/DL — SIGNIFICANT CHANGE UP (ref 7–23)
CALCIUM SERPL-MCNC: 8.7 MG/DL — SIGNIFICANT CHANGE UP (ref 8.5–10.1)
CHLORIDE SERPL-SCNC: 112 MMOL/L — HIGH (ref 96–108)
CO2 SERPL-SCNC: 27 MMOL/L — SIGNIFICANT CHANGE UP (ref 22–31)
CREAT SERPL-MCNC: 1 MG/DL — SIGNIFICANT CHANGE UP (ref 0.5–1.3)
EOSINOPHIL # BLD AUTO: 0.1 K/UL — SIGNIFICANT CHANGE UP (ref 0–0.5)
EOSINOPHIL NFR BLD AUTO: 1.3 % — SIGNIFICANT CHANGE UP (ref 0–6)
GLUCOSE SERPL-MCNC: 127 MG/DL — HIGH (ref 70–99)
HCT VFR BLD CALC: 31.7 % — LOW (ref 34.5–45)
HGB BLD-MCNC: 10 G/DL — LOW (ref 11.5–15.5)
IMM GRANULOCYTES NFR BLD AUTO: 0.4 % — SIGNIFICANT CHANGE UP (ref 0–1.5)
LYMPHOCYTES # BLD AUTO: 1.99 K/UL — SIGNIFICANT CHANGE UP (ref 1–3.3)
LYMPHOCYTES # BLD AUTO: 25.3 % — SIGNIFICANT CHANGE UP (ref 13–44)
MAGNESIUM SERPL-MCNC: 2.2 MG/DL — SIGNIFICANT CHANGE UP (ref 1.6–2.6)
MCHC RBC-ENTMCNC: 27.5 PG — SIGNIFICANT CHANGE UP (ref 27–34)
MCHC RBC-ENTMCNC: 31.5 GM/DL — LOW (ref 32–36)
MCV RBC AUTO: 87.3 FL — SIGNIFICANT CHANGE UP (ref 80–100)
MONOCYTES # BLD AUTO: 0.64 K/UL — SIGNIFICANT CHANGE UP (ref 0–0.9)
MONOCYTES NFR BLD AUTO: 8.1 % — SIGNIFICANT CHANGE UP (ref 2–14)
NEUTROPHILS # BLD AUTO: 5.08 K/UL — SIGNIFICANT CHANGE UP (ref 1.8–7.4)
NEUTROPHILS NFR BLD AUTO: 64.6 % — SIGNIFICANT CHANGE UP (ref 43–77)
NRBC # BLD: 0 /100 WBCS — SIGNIFICANT CHANGE UP (ref 0–0)
PHOSPHATE SERPL-MCNC: 3.8 MG/DL — SIGNIFICANT CHANGE UP (ref 2.5–4.5)
PLATELET # BLD AUTO: 197 K/UL — SIGNIFICANT CHANGE UP (ref 150–400)
POTASSIUM SERPL-MCNC: 3.2 MMOL/L — LOW (ref 3.5–5.3)
POTASSIUM SERPL-SCNC: 3.2 MMOL/L — LOW (ref 3.5–5.3)
PROT SERPL-MCNC: 6.3 G/DL — SIGNIFICANT CHANGE UP (ref 6–8.3)
RBC # BLD: 3.63 M/UL — LOW (ref 3.8–5.2)
RBC # FLD: 15.4 % — HIGH (ref 10.3–14.5)
SODIUM SERPL-SCNC: 146 MMOL/L — HIGH (ref 135–145)
WBC # BLD: 7.86 K/UL — SIGNIFICANT CHANGE UP (ref 3.8–10.5)
WBC # FLD AUTO: 7.86 K/UL — SIGNIFICANT CHANGE UP (ref 3.8–10.5)

## 2020-11-05 PROCEDURE — 99232 SBSQ HOSP IP/OBS MODERATE 35: CPT | Mod: GC

## 2020-11-05 RX ORDER — MULTIVIT-MIN/FERROUS GLUCONATE 9 MG/15 ML
15 LIQUID (ML) ORAL DAILY
Refills: 0 | Status: CANCELLED | OUTPATIENT
Start: 2020-11-05 | End: 2020-11-06

## 2020-11-05 RX ORDER — POTASSIUM CHLORIDE 20 MEQ
40 PACKET (EA) ORAL ONCE
Refills: 0 | Status: COMPLETED | OUTPATIENT
Start: 2020-11-05 | End: 2020-11-05

## 2020-11-05 RX ORDER — FENTANYL CITRATE 50 UG/ML
1 INJECTION INTRAVENOUS
Refills: 0 | Status: CANCELLED | OUTPATIENT
Start: 2020-11-08 | End: 2020-11-06

## 2020-11-05 RX ADMIN — Medication 1 TABLET(S): at 05:10

## 2020-11-05 RX ADMIN — Medication 4 PUFF(S): at 07:34

## 2020-11-05 RX ADMIN — CEFTRIAXONE 100 MILLIGRAM(S): 500 INJECTION, POWDER, FOR SOLUTION INTRAMUSCULAR; INTRAVENOUS at 12:03

## 2020-11-05 RX ADMIN — CHLORHEXIDINE GLUCONATE 1 APPLICATION(S): 213 SOLUTION TOPICAL at 12:04

## 2020-11-05 RX ADMIN — Medication 1 GRAM(S): at 17:29

## 2020-11-05 RX ADMIN — ALBUTEROL 4 PUFF(S): 90 AEROSOL, METERED ORAL at 02:20

## 2020-11-05 RX ADMIN — Medication 4 PUFF(S): at 13:04

## 2020-11-05 RX ADMIN — FENTANYL CITRATE 1 PATCH: 50 INJECTION INTRAVENOUS at 08:17

## 2020-11-05 RX ADMIN — Medication 1 TABLET(S): at 17:29

## 2020-11-05 RX ADMIN — FENTANYL CITRATE 1 PATCH: 50 INJECTION INTRAVENOUS at 19:00

## 2020-11-05 RX ADMIN — Medication 40 MILLIEQUIVALENT(S): at 13:12

## 2020-11-05 RX ADMIN — ALBUTEROL 4 PUFF(S): 90 AEROSOL, METERED ORAL at 13:04

## 2020-11-05 RX ADMIN — ENOXAPARIN SODIUM 40 MILLIGRAM(S): 100 INJECTION SUBCUTANEOUS at 12:03

## 2020-11-05 RX ADMIN — Medication 1 ENEMA: at 22:16

## 2020-11-05 RX ADMIN — Medication 4 PUFF(S): at 02:18

## 2020-11-05 RX ADMIN — ALBUTEROL 4 PUFF(S): 90 AEROSOL, METERED ORAL at 19:47

## 2020-11-05 RX ADMIN — Medication 1 GRAM(S): at 05:10

## 2020-11-05 RX ADMIN — FENTANYL CITRATE 1 PATCH: 50 INJECTION INTRAVENOUS at 11:00

## 2020-11-05 RX ADMIN — Medication 81 MILLIGRAM(S): at 12:03

## 2020-11-05 RX ADMIN — Medication 2: at 00:44

## 2020-11-05 RX ADMIN — ALBUTEROL 4 PUFF(S): 90 AEROSOL, METERED ORAL at 07:34

## 2020-11-05 RX ADMIN — Medication 4: at 12:04

## 2020-11-05 RX ADMIN — FENTANYL CITRATE 1 PATCH: 50 INJECTION INTRAVENOUS at 10:42

## 2020-11-05 RX ADMIN — FAMOTIDINE 20 MILLIGRAM(S): 10 INJECTION INTRAVENOUS at 12:03

## 2020-11-05 RX ADMIN — Medication 40 MILLIEQUIVALENT(S): at 08:16

## 2020-11-05 RX ADMIN — Medication 4 PUFF(S): at 19:47

## 2020-11-05 NOTE — DISCHARGE NOTE PROVIDER - PROVIDER TOKENS
PROVIDER:[TOKEN:[[id number]],FOLLOWUP:[1-3 days]],PROVIDER:[TOKEN:[[id number]],FOLLOWUP:[1 week]]

## 2020-11-05 NOTE — DISCHARGE NOTE PROVIDER - NSDCMRMEDTOKEN_GEN_ALL_CORE_FT
Acidophilus oral capsule: 1 cap(s) by gastrostomy tube 2 times a day  atropine 1% ophthalmic solution: instill 1 drop under tongue 3 times a day as needed   Calcarb with D: 500mg-600unit tablet    1 tablet by g-tube twice a day   enoxaparin 40 mg/0.4 mL injectable solution: 40 milligram(s) injectable once a day  fentaNYL 12 mcg/hr transdermal film, extended release: 1 film(s) transdermal every 72 hours  ferrous sulfate 300 mg/5 mL (60 mg/5 mL elemental iron) oral liquid: 5 milliliter(s) by gastrostomy tube once a day  Fleet Enema 7 g-19 g rectal enema: 133 milliliter(s) rectal 3 times a week  humalo unit(s) subcutaneous every 6 hours  Lantus Solostar Pen 100 units/mL subcutaneous solution: 48 unit(s) subcutaneous once a day (at bedtime)  Metamucil 3.4 g/7 g oral powder for reconstitution: 17 gram(s) by gastrostomy tube once a day (at bedtime)  Pepcid 20 mg oral tablet: 2 tab(s) by gastrostomy tube once a day  sucralfate 1 g oral tablet: 1 tab(s) by gastrostomy tube 2 times a day   Acidophilus oral capsule: 1 cap(s) by gastrostomy tube 2 times a day  albuterol 90 mcg/inh inhalation aerosol: 4 puff(s) inhaled every 6 hours  aspirin 81 mg oral tablet, chewable: 1 tab(s) by gastrostomy tube once a day  atropine 1% ophthalmic solution: instill 1 drop under tongue 3 times a day as needed   calcium-vitamin D 500 mg-200 intl units (5 mcg) oral tablet: 1 tab(s) by gastrostomy tube 2 times a day  cefTRIAXone 1 g intravenous injection: 1 gram(s) intramuscular every 24 hours ,last day 11/12/20   enoxaparin 40 mg/0.4 mL injectable solution: 40 milligram(s) injectable once a day  famotidine 20 mg oral tablet: 1 tab(s) by gastrostomy tube once a day  fentaNYL 12 mcg/hr transdermal film, extended release: 1 patch transdermal every 72 hours  ferrous sulfate 300 mg/5 mL (60 mg/5 mL elemental iron) oral liquid: 5 milliliter(s) by gastrostomy tube once a day  Fleet Enema 7 g-19 g rectal enema: 133 milliliter(s) rectal 3 times a week  insulin lispro 100 units/mL injectable solution:  injectable corrective regimen sliding scale as per protocol   ipratropium CFC free 17 mcg/inh inhalation aerosol: 4 puff(s) inhaled every 6 hours  Metamucil 3.4 g/7 g oral powder for reconstitution: 17 gram(s) by gastrostomy tube once a day (at bedtime)  Multiple Vitamins with Minerals oral liquid: by gastrostomy tube once a day  sucralfate 1 g oral tablet: 1 tab(s) by gastrostomy tube 2 times a day

## 2020-11-05 NOTE — DISCHARGE NOTE PROVIDER - NSDCCAREPROVSEEN_GEN_ALL_CORE_FT
Brendon, Arturo Roca, Kevin Gonzales, Babak Foster, Tessa Reddy, Mohsen Schmuter, Tanya Andrea, Heriberto Sandoval, Van

## 2020-11-05 NOTE — PROGRESS NOTE ADULT - ASSESSMENT
73 yo F PMH frontotemporal dementia, aphasic stroke, chronic trach & peg, bed bound, insulin dependent DM, covid in march, HTN, recent PNA admitted for severe sepsis 2/2 uti.    Neuro: no acute issues. c/w transdermal fentanyl patch  Cardio: hemodynamically stable. TTE shows EF 60-65%, Grade 1 LV diastolic dysfunction, MR, TR. ASA 81 daily through peg tube. Strict Is/Os  Pulm: continue with mechanical ventilation. CXR clear. Continue albuterol & ipatropium 4 puffs q6 for secretions. 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  GI: Glucerna feeds through peg. 250 cc free water q6 for hypernatremia. c/w fleet enema, famotidine, Miralax sucralfate, and lactobillus  Renal: prerenal RYAN 2/2 sepsis with lactic acidosis - resolved with fluid administration. Chronic Woody for urinary retention. Hypernatremia - 250 cc free water q6. Replete potassium.  ID: Urosepsis - Ucx grew proteus resistant to nitrofurantoin, continue ceftriaxone 1 g daily for a total of a 14 day course (last day 11/12), blood cultures negative, sputum culture with few psuedomonas, likely colonization  Endo: ISS for insulin dependent diabetes, HbA1C 6.6  heme: 40 mg SQ LVX DVT ppx    Dispo: transfer to medicine floor   73 yo F PMH frontotemporal dementia, aphasic stroke, chronic trach & peg, bed bound, insulin dependent DM, covid in march, HTN, recent PNA admitted for severe sepsis 2/2 uti.    Neuro: no acute issues. c/w transdermal fentanyl patch  Cardio: hemodynamically stable. TTE shows EF 60-65%, Grade 1 LV diastolic dysfunction, MR, TR. ASA 81 daily through peg tube. Strict Is/Os  Pulm: Continue with mechanical ventilation. CXR clear. Continue albuterol & ipatropium 4 puffs q6 for secretions. 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  GI: Glucerna feeds through peg. 250 cc free water q6 for hypernatremia. c/w fleet enema, famotidine, Miralax sucralfate, and lactobillus  Renal: prerenal RYAN 2/2 sepsis with lactic acidosis - resolved with fluid administration. Hypernatremia - 250 cc free water q6. Replete potassium. Annalise D/C'd  ID: Urosepsis - Ucx grew proteus resistant to nitrofurantoin, continue ceftriaxone 1 g daily for a total of a 14 day course (last day 11/12), blood cultures negative, sputum culture with few psuedomonas, likely colonization  Endo: ISS for insulin dependent diabetes, HbA1C 6.6  heme: 40 mg SQ LVX DVT ppx    Dispo: transfer to medicine floor

## 2020-11-05 NOTE — PHARMACOTHERAPY INTERVENTION NOTE - COMMENTS
Patient ordered fentanyl patch q72 hours, order in [facility] automatically discontinued due to narcotic nature of drug after 7 days. Spoke to Dr Umanzor to reorder; accepted
Patient ordered for Zosyn 3.375g q8h, blood cultures growing GNRs. Current order has stop date for 3 days, discussed with Dr. Gonzales regarding stop date. Per MD, re-ordered Zosyn 3.375g q8h for 7 day course.

## 2020-11-05 NOTE — PROGRESS NOTE ADULT - SUBJECTIVE AND OBJECTIVE BOX
[facility]       HPI:  75 yo Female sent from [facility] with respiratory distress. No report of fever or hypoxia. Patient trached/vented ,s/p peg  and non-verbal, unable to contribute to history. Transferred to [place] from [place] ED for admission Admitted for septic workup and evaluation ,send blood and urine cx,serial lactate levels,monitor vitals closely hydration,monitor urine output and renal profile, iv abx initiated -s/p vanco and zosyn . BP stable upon [place] ED arrival Med hx is significant for Advanced dementia ,s/p  Aphasic stroke    Constipation  ,COVID-19   ,CVA (cerebral vascular accident)  ,Dementia of frontal lobe type  ,Diabetes mellitus  ,DM (diabetes mellitus)  ,GERD (gastroesophageal reflux disease)    HTN (hypertension)  ,Hypertension  ,Pneumonia  ,Quadriplegia  ,Respiratory failure ,s/p tracheostomy and peg .Found to have RYAN ,hypernatremia and lactate elevated Admit for iv hydration,monitor renal profile and urine output,nutritionist consult,prealbumin level,serial bmp,nutritional supplements, Palliative care consult requested ,to discuss advance directives and complete MOLST  (30 Oct 2020 07:14)        SUBJECTIVE:      ALLERGIES:  Allergies    codeine (Hives)    Intolerances          MEDICATIONS  (STANDING):  ALBUTerol    90 MICROgram(s) HFA Inhaler 4 Puff(s) Inhalation every 6 hours  aspirin  chewable 81 milliGRAM(s) Oral daily  calcium carbonate 1250 mG  + Vitamin D (OsCal 500 + D) 1 Tablet(s) Oral two times a day  cefTRIAXone   IVPB 1000 milliGRAM(s) IV Intermittent every 24 hours  chlorhexidine 2% Cloths 1 Application(s) Topical daily  dextrose 5%. 1000 milliLiter(s) (50 mL/Hr) IV Continuous <Continuous>  enoxaparin Injectable 40 milliGRAM(s) SubCutaneous daily  famotidine    Tablet 20 milliGRAM(s) Oral daily  insulin lispro (ADMELOG) corrective regimen sliding scale   SubCutaneous every 6 hours  ipratropium 17 MICROgram(s) HFA Inhaler 4 Puff(s) Inhalation every 6 hours  lactobacillus acidophilus 1 Tablet(s) Oral two times a day  saline laxative (FLEET) Rectal Enema 1 Enema Rectal at bedtime  sucralfate 1 Gram(s) Oral two times a day    MEDICATIONS  (PRN):  atropine 1% Ophthalmic Solution for SubLingual Use 1 Drop(s) SubLingual three times a day PRN salicary secretion  dextrose 40% Gel 15 Gram(s) Oral once PRN Blood Glucose LESS THAN 70 milliGRAM(s)/deciliter  glucagon  Injectable 1 milliGRAM(s) IntraMuscular once PRN Glucose LESS THAN 70 milligrams/deciliter      REVIEW OF SYSTEMS:  CONSTITUTIONAL: No fever,  RESPIRATORY: No cough, wheezing, shortness of breath  CARDIOVASCULAR: No chest pain, dyspnea, palpitations, dizziness, syncope, paroxysmal nocturnal dyspnea, orthopnea, or arm or leg swelling  GASTROINTESTINAL: No abdominal  or epigastric pain, nausea, vomiting,  diarrhea  NEUROLOGICAL: No headaches,  loss of strength, numbness, or tremors    Vital Signs Last 24 Hrs  T(C): 36.9 (05 Nov 2020 20:25), Max: 37.3 (05 Nov 2020 05:32)  T(F): 98.5 (05 Nov 2020 20:25), Max: 99.2 (05 Nov 2020 05:32)  HR: 80 (05 Nov 2020 19:48) (65 - 127)  BP: 176/79 (05 Nov 2020 19:00) (100/49 - 178/74)  BP(mean): 113 (05 Nov 2020 19:00) (70 - 113)  RR: 0 (05 Nov 2020 19:00) (0 - 34)  SpO2: 100% (05 Nov 2020 19:48) (99% - 100%)    PHYSICAL EXAM:  HEAD:  Atraumatic, Normocephalic  NECK: Supple and normal thyroid.  No JVD or carotid bruit.   HEART: S1, S2 regular , 1/6 soft ejection systolic murmur in mitral area , no thrill and no gallops .  PULMONARY: Bilateral vesicular breathing , few scattered ronchi and few scattered rales are present .  ABDOMEN: Soft nontender and positive bowl sounds   EXTREMITIES:  No clubbing, cyanosis, or pedal  edema  NEUROLOGICAL: AAOX3 , no focal deficit .    LABS:                        10.0   7.86  )-----------( 197      ( 05 Nov 2020 05:23 )             31.7     11-05    146<H>  |  112<H>  |  20  ----------------------------<  127<H>  3.2<L>   |  27  |  1.00    Ca    8.7      05 Nov 2020 05:23  Phos  3.8     11-05  Mg     2.2     11-05    TPro  6.3  /  Alb  2.5<L>  /  TBili  0.3  /  DBili  x   /  AST  18  /  ALT  27  /  AlkPhos  68  11-05            BNP      EKG:  ECHO:  IMAGING:    Assessment/Plan    Will continue to follow during hospital course and give further recommendations as needed . Thanks for your referral . if any questions please contact me at office ([phone])cell [phone]) JOSIE SON MD [place] [address]  [address]  OFFICE : [phone]  CELL : [phone]    CARDIOLOGY F/U :       HPI:  73 yo Female sent from [facility] with respiratory distress. No report of fever or hypoxia. Patient trached/vented ,s/p peg  and non-verbal, unable to contribute to history. Transferred to [place] from [place] ED for admission Admitted for septic workup and evaluation ,send blood and urine cx,serial lactate levels,monitor vitals closely hydration,monitor urine output and renal profile, iv abx initiated -s/p vanco and zosyn . BP stable upon [place] ED arrival Med hx is significant for Advanced dementia ,s/p  Aphasic stroke    Constipation  ,COVID-19   ,CVA (cerebral vascular accident)  ,Dementia of frontal lobe type  ,Diabetes mellitus  ,DM (diabetes mellitus)  ,GERD (gastroesophageal reflux disease)    HTN (hypertension)  ,Hypertension  ,Pneumonia  ,Quadriplegia  ,Respiratory failure ,s/p tracheostomy and peg .Found to have RYAN ,hypernatremia and lactate elevated Admit for iv hydration,monitor renal profile and urine output,nutritionist consult,prealbumin level,serial bmp,nutritional supplements, Palliative care consult requested ,to discuss advance directives and complete MOLST  (30 Oct 2020 07:14)        SUBJECTIVE: Patient intubated and no distress .      ALLERGIES:  Allergies    codeine (Hives)    Intolerances          MEDICATIONS  (STANDING):  ALBUTerol    90 MICROgram(s) HFA Inhaler 4 Puff(s) Inhalation every 6 hours  aspirin  chewable 81 milliGRAM(s) Oral daily  calcium carbonate 1250 mG  + Vitamin D (OsCal 500 + D) 1 Tablet(s) Oral two times a day  cefTRIAXone   IVPB 1000 milliGRAM(s) IV Intermittent every 24 hours  chlorhexidine 2% Cloths 1 Application(s) Topical daily  dextrose 5%. 1000 milliLiter(s) (50 mL/Hr) IV Continuous <Continuous>  enoxaparin Injectable 40 milliGRAM(s) SubCutaneous daily  famotidine    Tablet 20 milliGRAM(s) Oral daily  insulin lispro (ADMELOG) corrective regimen sliding scale   SubCutaneous every 6 hours  ipratropium 17 MICROgram(s) HFA Inhaler 4 Puff(s) Inhalation every 6 hours  lactobacillus acidophilus 1 Tablet(s) Oral two times a day  saline laxative (FLEET) Rectal Enema 1 Enema Rectal at bedtime  sucralfate 1 Gram(s) Oral two times a day    MEDICATIONS  (PRN):  atropine 1% Ophthalmic Solution for SubLingual Use 1 Drop(s) SubLingual three times a day PRN salicary secretion  dextrose 40% Gel 15 Gram(s) Oral once PRN Blood Glucose LESS THAN 70 milliGRAM(s)/deciliter  glucagon  Injectable 1 milliGRAM(s) IntraMuscular once PRN Glucose LESS THAN 70 milligrams/deciliter      REVIEW OF SYSTEMS:  CONSTITUTIONAL: No fever,  RESPIRATORY: No cough, wheezing, shortness of breath and patient vent dependant .  CARDIOVASCULAR: No chest pain, dyspnea, palpitations, dizziness, syncope, paroxysmal nocturnal dyspnea, orthopnea, or arm or leg swelling  GASTROINTESTINAL: No abdominal  or epigastric pain, nausea, vomiting,  diarrhea  NEUROLOGICAL: No headaches, numbness, or tremors  Skin : No itching .  Hematology : No bleeding .  Endocrinology : No heat and cold intolerance .  Psychiatry : Patient is confused .  Musculoskeletal : Patient has chronic arthritis .    Vital Signs Last 24 Hrs  T(C): 36.9 (05 Nov 2020 20:25), Max: 37.3 (05 Nov 2020 05:32)  T(F): 98.5 (05 Nov 2020 20:25), Max: 99.2 (05 Nov 2020 05:32)  HR: 80 (05 Nov 2020 19:48) (65 - 127)  BP: 176/79 (05 Nov 2020 19:00) (100/49 - 178/74)  BP(mean): 113 (05 Nov 2020 19:00) (70 - 113)  RR: 0 (05 Nov 2020 19:00) (0 - 34)  SpO2: 100% (05 Nov 2020 19:48) (99% - 100%)    PHYSICAL EXAM:  HEAD:  Atraumatic, Normocephalic  NECK: Supple and normal thyroid.  No JVD or carotid bruit.   HEART: S1, S2 irregular , 1/6 soft ejection systolic murmur in mitral area , no thrill and no gallops .  PULMONARY: Bilateral vesicular breathing , few scattered rhonchi and few scattered rales are present .  ABDOMEN: Soft nontender and positive bowl sounds   EXTREMITIES:  No clubbing, cyanosis, or pedal  edema  NEUROLOGICAL: AA and mild confused .   Skin : No rashes .  Musculoskeletal : No joint swellings .    LABS:                        10.0   7.86  )-----------( 197      ( 05 Nov 2020 05:23 )             31.7     11-05    146<H>  |  112<H>  |  20  ----------------------------<  127<H>  3.2<L>   |  27  |  1.00    Ca    8.7      05 Nov 2020 05:23  Phos  3.8     11-05  Mg     2.2     11-05    TPro  6.3  /  Alb  2.5<L>  /  TBili  0.3  /  DBili  x   /  AST  18  /  ALT  27  /  AlkPhos  68  11-05          Assessment/Plan  Patient has :  1) Respiratory failure and vent dependant .  2) Pneumonia   3) Urosepsis .  4) S/P hypotension and BP stable   5) H/O DM .  6) H/O GERD .  7) H/O CVA , quadriplegia .  8) Anemia   Plan : 1) Continue I/V hydration 2) I/V antibiotics . 3) Monitor hemoglobin and electrolytes .  Will continue to follow during hospital course and give further recommendations as needed . Thanks for your referral . if any questions please contact me at office ([phone] [phone])

## 2020-11-05 NOTE — PROGRESS NOTE ADULT - SUBJECTIVE AND OBJECTIVE BOX
Date/Time Patient Seen:  		  Referring MD:   Data Reviewed	       Patient is a 74y old  Female who presents with a chief complaint of fever (04 Nov 2020 07:19)      Subjective/HPI     PAST MEDICAL & SURGICAL HISTORY:  Pneumonia    Quadriplegia    COVID-19 virus detected    Advanced dementia    DM (diabetes mellitus)    HTN (hypertension)    CVA (cerebral vascular accident)    Respiratory failure    Constipation    GERD (gastroesophageal reflux disease)    Hypertension    Respiratory failure    Diabetes mellitus    Aphasic stroke    Dementia of frontal lobe type    Feeding by G-tube    Tracheostomy tube present    Tracheostomy in place    Gastrostomy in place    Hx of appendectomy          Medication list         MEDICATIONS  (STANDING):  ALBUTerol    90 MICROgram(s) HFA Inhaler 4 Puff(s) Inhalation every 6 hours  aspirin  chewable 81 milliGRAM(s) Oral daily  calcium carbonate 1250 mG  + Vitamin D (OsCal 500 + D) 1 Tablet(s) Oral two times a day  cefTRIAXone   IVPB 1000 milliGRAM(s) IV Intermittent every 24 hours  chlorhexidine 2% Cloths 1 Application(s) Topical daily  dextrose 5%. 1000 milliLiter(s) (50 mL/Hr) IV Continuous <Continuous>  enoxaparin Injectable 40 milliGRAM(s) SubCutaneous daily  famotidine    Tablet 20 milliGRAM(s) Oral daily  fentaNYL   Patch  12 MICROgram(s)/Hr 1 Patch Transdermal every 72 hours  insulin lispro (ADMELOG) corrective regimen sliding scale   SubCutaneous every 6 hours  ipratropium 17 MICROgram(s) HFA Inhaler 4 Puff(s) Inhalation every 6 hours  lactobacillus acidophilus 1 Tablet(s) Oral two times a day  saline laxative (FLEET) Rectal Enema 1 Enema Rectal at bedtime  sucralfate 1 Gram(s) Oral two times a day    MEDICATIONS  (PRN):  atropine 1% Ophthalmic Solution for SubLingual Use 1 Drop(s) SubLingual three times a day PRN salicary secretion  dextrose 40% Gel 15 Gram(s) Oral once PRN Blood Glucose LESS THAN 70 milliGRAM(s)/deciliter  glucagon  Injectable 1 milliGRAM(s) IntraMuscular once PRN Glucose LESS THAN 70 milligrams/deciliter         Vitals log        ICU Vital Signs Last 24 Hrs  T(C): 37.3 (05 Nov 2020 05:32), Max: 37.3 (05 Nov 2020 05:32)  T(F): 99.2 (05 Nov 2020 05:32), Max: 99.2 (05 Nov 2020 05:32)  HR: 82 (05 Nov 2020 06:00) (65 - 84)  BP: 128/57 (05 Nov 2020 06:00) (100/49 - 159/69)  BP(mean): 82 (05 Nov 2020 06:00) (70 - 101)  ABP: --  ABP(mean): --  RR: 23 (05 Nov 2020 06:00) (14 - 23)  SpO2: 100% (05 Nov 2020 06:00) (99% - 100%)       Mode: AC/ CMV (Assist Control/ Continuous Mandatory Ventilation)  RR (machine): 14  TV (machine): 400  FiO2: 30  PEEP: 5  ITime: 1  MAP: 9.3  PIP: 21      Input and Output:  I&O's Detail    03 Nov 2020 07:01  -  04 Nov 2020 07:00  --------------------------------------------------------  IN:    Enteral Tube Flush: 250 mL    Free Water: 330 mL    Glucerna: 500 mL  Total IN: 1080 mL    OUT:    Indwelling Catheter - Urethral (mL): 1355 mL  Total OUT: 1355 mL    Total NET: -275 mL      04 Nov 2020 07:01  -  05 Nov 2020 06:54  --------------------------------------------------------  IN:    Enteral Tube Flush: 210 mL    Free Water: 1010 mL    Glucerna: 1000 mL    IV PiggyBack: 50 mL  Total IN: 2270 mL    OUT:    Incontinent per Collection Bag (mL): 500 mL    Indwelling Catheter - Urethral (mL): 450 mL  Total OUT: 950 mL    Total NET: 1320 mL          Lab Data                        10.0   7.86  )-----------( 197      ( 05 Nov 2020 05:23 )             31.7     11-05    146<H>  |  112<H>  |  20  ----------------------------<  127<H>  3.2<L>   |  27  |  1.00    Ca    8.7      05 Nov 2020 05:23  Phos  3.8     11-05  Mg     2.2     11-05    TPro  6.3  /  Alb  2.5<L>  /  TBili  0.3  /  DBili  x   /  AST  18  /  ALT  27  /  AlkPhos  68  11-05            Review of Systems	      Objective     Physical Examination    heart s1s2  lung dec BS  abd soft      Pertinent Lab findings & Imaging      Annalise:  NO   Adequate UO     I&O's Detail    03 Nov 2020 07:01  -  04 Nov 2020 07:00  --------------------------------------------------------  IN:    Enteral Tube Flush: 250 mL    Free Water: 330 mL    Glucerna: 500 mL  Total IN: 1080 mL    OUT:    Indwelling Catheter - Urethral (mL): 1355 mL  Total OUT: 1355 mL    Total NET: -275 mL      04 Nov 2020 07:01  -  05 Nov 2020 06:54  --------------------------------------------------------  IN:    Enteral Tube Flush: 210 mL    Free Water: 1010 mL    Glucerna: 1000 mL    IV PiggyBack: 50 mL  Total IN: 2270 mL    OUT:    Incontinent per Collection Bag (mL): 500 mL    Indwelling Catheter - Urethral (mL): 450 mL  Total OUT: 950 mL    Total NET: 1320 mL               Discussed with:     Cultures:	        Radiology

## 2020-11-05 NOTE — PROGRESS NOTE ADULT - SUBJECTIVE AND OBJECTIVE BOX
PROGRESS NOTE  Patient is a 74y old  Female who presents with a chief complaint of fever (04 Nov 2020 07:19)  Chart and available morning labs /imaging are reviewed electronically , urgent issues addressed . More information  is being added upon completion of rounds , when more information is collected and management discussed with consultants , medical staff and social service/case management on the floor   LATE ENTRY- patient was seen and examined earlier today . Plan of care was discussed with med staff and unit coordinator .   OVERNIGHT  No new issues reported by medical staff . All above noted   HPI:  73 yo Female sent from [facility] with respiratory distress. No report of fever or hypoxia. Patient trached/vented ,s/p peg  and non-verbal, unable to contribute to history. Transferred to [place] from [place] ED for admission Admitted for septic workup and evaluation ,send blood and urine cx,serial lactate levels,monitor vitals closely hydration,monitor urine output and renal profile, iv abx initiated -s/p vanco and zosyn . BP stable upon [place] ED arrival Med hx is significant for Advanced dementia ,s/p  Aphasic stroke    Constipation  ,COVID-19   ,CVA (cerebral vascular accident)  ,Dementia of frontal lobe type  ,Diabetes mellitus  ,DM (diabetes mellitus)  ,GERD (gastroesophageal reflux disease)    HTN (hypertension)  ,Hypertension  ,Pneumonia  ,Quadriplegia  ,Respiratory failure ,s/p tracheostomy and peg .Found to have RYAN ,hypernatremia and lactate elevated Admit for iv hydration,monitor renal profile and urine output,nutritionist consult,prealbumin level,serial bmp,nutritional supplements, Palliative care consult requested ,to discuss advance directives and complete MOLST  (30 Oct 2020 07:14)    PAST MEDICAL & SURGICAL HISTORY:  Pneumonia    Quadriplegia    COVID-19 virus detected    Advanced dementia    DM (diabetes mellitus)    HTN (hypertension)    CVA (cerebral vascular accident)    Respiratory failure    Constipation    GERD (gastroesophageal reflux disease)    Hypertension    Respiratory failure    Diabetes mellitus    Aphasic stroke    Dementia of frontal lobe type    Feeding by G-tube    Tracheostomy tube present    Tracheostomy in place    Gastrostomy in place    Hx of appendectomy        MEDICATIONS  (STANDING):  ALBUTerol    90 MICROgram(s) HFA Inhaler 4 Puff(s) Inhalation every 6 hours  aspirin  chewable 81 milliGRAM(s) Oral daily  calcium carbonate 1250 mG  + Vitamin D (OsCal 500 + D) 1 Tablet(s) Oral two times a day  cefTRIAXone   IVPB 1000 milliGRAM(s) IV Intermittent every 24 hours  chlorhexidine 2% Cloths 1 Application(s) Topical daily  dextrose 5%. 1000 milliLiter(s) (50 mL/Hr) IV Continuous <Continuous>  enoxaparin Injectable 40 milliGRAM(s) SubCutaneous daily  famotidine    Tablet 20 milliGRAM(s) Oral daily  insulin lispro (ADMELOG) corrective regimen sliding scale   SubCutaneous every 6 hours  ipratropium 17 MICROgram(s) HFA Inhaler 4 Puff(s) Inhalation every 6 hours  lactobacillus acidophilus 1 Tablet(s) Oral two times a day  potassium chloride   Powder 40 milliEquivalent(s) Enteral Tube once  saline laxative (FLEET) Rectal Enema 1 Enema Rectal at bedtime  sucralfate 1 Gram(s) Oral two times a day    MEDICATIONS  (PRN):  atropine 1% Ophthalmic Solution for SubLingual Use 1 Drop(s) SubLingual three times a day PRN salicary secretion  dextrose 40% Gel 15 Gram(s) Oral once PRN Blood Glucose LESS THAN 70 milliGRAM(s)/deciliter  glucagon  Injectable 1 milliGRAM(s) IntraMuscular once PRN Glucose LESS THAN 70 milligrams/deciliter      OBJECTIVE    T(C): 36.9 (11-05-20 @ 08:00), Max: 37.3 (11-05-20 @ 05:32)  HR: 107 (11-05-20 @ 12:02) (65 - 127)  BP: 114/66 (11-05-20 @ 12:00) (100/49 - 145/63)  RR: 24 (11-05-20 @ 12:00) (12 - 34)  SpO2: 100% (11-05-20 @ 12:02) (99% - 100%)  Wt(kg): --  I&O's Summary    04 Nov 2020 07:01 - 05 Nov 2020 07:00  --------------------------------------------------------  IN: 2270 mL / OUT: 950 mL / NET: 1320 mL    05 Nov 2020 07:01 - 05 Nov 2020 13:09  --------------------------------------------------------  IN: 600 mL / OUT: 0 mL / NET: 600 mL      NOTE In accordance with  [facility] policy ICU final medical management decisions/MD orders are  determined/done only by ICU Intensivists     REVIEW OF SYSTEMS:  unobtainable due to tracheostomy status   and mental state   PHYSICAL EXAM:  Appearance: NAD. VS past 24 hrs -as above   HEENT:   Moist oral mucosa. Conjunctiva clear b/l.   Neck : supple s/p tracheostomy   Respiratory: Lungs CTAB.  Gastrointestinal:  Soft, nontender. No rebound. No rigidity. BS present	peg   Cardiovascular: RRR ,S1S2 present  Neurologic: Non-focal. Moving all extremities. Contractures of ue and le   Extremities: No edema. No erythema. No calf tenderness.  Skin: No rashes, No ecchymoses, No cyanosis.	  wounds ,skin lesions-See skin assesment flow sheet   LABS:                        10.0   7.86  )-----------( 197      ( 05 Nov 2020 05:23 )             31.7     11-05    146<H>  |  112<H>  |  20  ----------------------------<  127<H>  3.2<L>   |  27  |  1.00    Ca    8.7      05 Nov 2020 05:23  Phos  3.8     11-05  Mg     2.2     11-05    TPro  6.3  /  Alb  2.5<L>  /  TBili  0.3  /  DBili  x   /  AST  18  /  ALT  27  /  AlkPhos  68  11-05    CAPILLARY BLOOD GLUCOSE      POCT Blood Glucose.: 208 mg/dL (05 Nov 2020 11:59)  POCT Blood Glucose.: 132 mg/dL (05 Nov 2020 05:22)  POCT Blood Glucose.: 195 mg/dL (05 Nov 2020 00:41)  POCT Blood Glucose.: 157 mg/dL (04 Nov 2020 17:29)          Culture - Sputum (collected 31 Oct 2020 01:25)  Source: .Sputum Sputum  Gram Stain (31 Oct 2020 05:02):    Few Squamous epithelial cells per low power field    Few polymorphonuclear leukocytes per low power field    Rare Yeast like cells per oil power field    Rare Gram Negative Rods per oil power field  Final Report (02 Nov 2020 17:02):    Mixed gram negative rods Culture includes    Few Pseudomonas aeruginosa    Normal Respiratory Elvira present  Organism: Pseudomonas aeruginosa (02 Nov 2020 17:02)  Organism: Pseudomonas aeruginosa (02 Nov 2020 17:02)    Culture - Urine (collected 30 Oct 2020 13:18)  Source: .Urine Clean Catch (Midstream)  Final Report (01 Nov 2020 19:08):    >100,000 CFU/ml Proteus mirabilis  Organism: Proteus mirabilis (01 Nov 2020 19:08)  Organism: Proteus mirabilis (01 Nov 2020 19:08)    Culture - Blood (collected 30 Oct 2020 13:18)  Source: .Blood Blood-Peripheral  Final Report (04 Nov 2020 13:00):    No Growth Final    Culture - Blood (collected 30 Oct 2020 13:18)  Source: .Blood Blood-Peripheral  Final Report (04 Nov 2020 13:00):    No Growth Final      RADIOLOGY & ADDITIONAL TESTS:   reviewed elctronically  ASSESSMENT/PLAN:

## 2020-11-05 NOTE — CHART NOTE - NSCHARTNOTEFT_GEN_A_CORE
Assessment/Follow up: Pt continues on peg feedings of Glucerna 1.5 @ goal rate 50cc/hr x 20hrs, Free water flush 250cc q 6hrs. TF well tolerated, no residuals documented past 3 days. No N/V. Large creamy brown stool 11/4, large loose yellowish stool 11/5. S/p fleet enema, miralax rx. Recommend holding laxatives per MD/GI discretion while diarrhea persists. Per TF pump study pt received 907ml TF over past 24hrs (91% estimated energy needs) and 1440ml TF past 48hrs (only 53% estimated energy needs preceding day). Encourage minimizing TF interruptions to allow pt to meet estimated energy/pro needs. RN made aware.     Factors impacting intake: [ ] none [ ] nausea  [ ] vomiting [ ] diarrhea [ ] constipation  [ ]chewing problems [ x] swallowing issues  [x ] other: NPO with peg    Diet Presciption: Diet, NPO with Tube Feed:   Tube Feeding Modality: Gastrostomy  Glucerna 1.5  Total Volume for 24 Hours (mL): 1000  Continuous  Starting Tube Feed Rate {mL per Hour}: 30  Increase Tube Feed Rate by (mL): 10     Every 8 hours  Until Goal Tube Feed Rate (mL per Hour): 50  Tube Feed Duration (in Hours): 20  Tube Feed Start Time: 06:00 (10-30-20 @ 15:22)      Current Weight: Weight (kg): 56.6 (10-30 @ 03:15), 56.7 (10-29 @ 22:25); request current weight to assess.     Pertinent Medications: MEDICATIONS  (STANDING):  ALBUTerol    90 MICROgram(s) HFA Inhaler 4 Puff(s) Inhalation every 6 hours  aspirin  chewable 81 milliGRAM(s) Oral daily  calcium carbonate 1250 mG  + Vitamin D (OsCal 500 + D) 1 Tablet(s) Oral two times a day  cefTRIAXone   IVPB 1000 milliGRAM(s) IV Intermittent every 24 hours  chlorhexidine 2% Cloths 1 Application(s) Topical daily  dextrose 5%. 1000 milliLiter(s) (50 mL/Hr) IV Continuous <Continuous>  enoxaparin Injectable 40 milliGRAM(s) SubCutaneous daily  famotidine    Tablet 20 milliGRAM(s) Oral daily  fentaNYL   Patch  12 MICROgram(s)/Hr 1 Patch Transdermal every 72 hours  insulin lispro (ADMELOG) corrective regimen sliding scale   SubCutaneous every 6 hours  ipratropium 17 MICROgram(s) HFA Inhaler 4 Puff(s) Inhalation every 6 hours  lactobacillus acidophilus 1 Tablet(s) Oral two times a day  potassium chloride   Powder 40 milliEquivalent(s) Enteral Tube once  saline laxative (FLEET) Rectal Enema 1 Enema Rectal at bedtime  sucralfate 1 Gram(s) Oral two times a day    MEDICATIONS  (PRN):  atropine 1% Ophthalmic Solution for SubLingual Use 1 Drop(s) SubLingual three times a day PRN salicary secretion  dextrose 40% Gel 15 Gram(s) Oral once PRN Blood Glucose LESS THAN 70 milliGRAM(s)/deciliter  glucagon  Injectable 1 milliGRAM(s) IntraMuscular once PRN Glucose LESS THAN 70 milligrams/deciliter    Pertinent Labs: 11-05 Na146 mmol/L<H> Glu 127 mg/dL<H> K+ 3.2 mmol/L<L> Cr  1.00 mg/dL BUN 20 mg/dL 11-05 Phos 3.8 mg/dL 11-05 Alb 2.5 g/dL<L>     CAPILLARY BLOOD GLUCOSE      POCT Blood Glucose.: 132 mg/dL (05 Nov 2020 05:22)  POCT Blood Glucose.: 195 mg/dL (05 Nov 2020 00:41)  POCT Blood Glucose.: 157 mg/dL (04 Nov 2020 17:29)  POCT Blood Glucose.: 206 mg/dL (04 Nov 2020 11:56)    Skin: intact. Luke 11.     Estimated Needs:   [ x] no change since previous assessment  [ ] recalculated:     Previous Nutrition Diagnosis: Not applicable  [ ] Inadequate Energy Intake [ ]Inadequate Oral Intake [ ] Excessive Energy Intake   [ ] Underweight [ ] Increased Nutrient Needs [ ] Overweight/Obesity   [ ] Altered GI Function [ ] Unintended Weight Loss [ ] Food & Nutrition Related Knowledge Deficit [ ] Malnutrition       New Nutrition Diagnosis: [x ] Inadequate enteral nutrition infusion related to infusion volume not reached as evidenced by TF pump study revealing inadequate energy/protein intake.       Interventions:   Recommend  [ ] Change Diet To:  [ ] Nutrition Supplement  [ ] Nutrition Support  [x ] Other: Encourage minimizing TF interruptions to allow pt to meet estimated kcal/pro needs. MVI with minerals daily (liquid) via peg. Electrolyte replacement. Holding laxatives while diarrhea persists.     Monitoring and Evaluation:   [ ] PO intake [ x ] Tolerance to diet prescription [ x ] weights [ x ] labs[ x ] follow up per protocol  [ ] other:

## 2020-11-05 NOTE — DISCHARGE NOTE PROVIDER - NSDCCPCAREPLAN_GEN_ALL_CORE_FT
PRINCIPAL DISCHARGE DIAGNOSIS  Diagnosis: Sepsis, due to unspecified organism, unspecified whether acute organ dysfunction present  Assessment and Plan of Treatment:       SECONDARY DISCHARGE DIAGNOSES  Diagnosis: Urinary tract infection without hematuria, site unspecified  Assessment and Plan of Treatment:     Diagnosis: Respiratory failure  Assessment and Plan of Treatment: Respiratory failure    Diagnosis: RYAN (acute kidney injury)  Assessment and Plan of Treatment: RYAN (acute kidney injury)    Diagnosis: Advanced dementia  Assessment and Plan of Treatment: Advanced dementia    Diagnosis: DM (diabetes mellitus)  Assessment and Plan of Treatment: DM (diabetes mellitus)    Diagnosis: Quadriplegia  Assessment and Plan of Treatment: Quadriplegia    Diagnosis: HTN (hypertension)  Assessment and Plan of Treatment: HTN (hypertension)

## 2020-11-05 NOTE — PROGRESS NOTE ADULT - SUBJECTIVE AND OBJECTIVE BOX
BREA BECKHAM    \[facility]\"" ICU1 18    Patient is a 74y old  Female who presents with a chief complaint of fever (04 Nov 2020 07:19)       Allergies    codeine (Hives)    Intolerances        HPI:  75 yo Female sent from [facility] with respiratory distress. No report of fever or hypoxia. Patient trached/vented ,s/p peg  and non-verbal, unable to contribute to history. Transferred to [facility] from [place] ED for admission Admitted for septic workup and evaluation ,send blood and urine cx,serial lactate levels,monitor vitals closely hydration,monitor urine output and renal profile, iv abx initiated -s/p vanco and zosyn . BP stable upon [facility] ED arrival Med hx is significant for Advanced dementia ,s/p  Aphasic stroke    Constipation  ,COVID-19   ,CVA (cerebral vascular accident)  ,Dementia of frontal lobe type  ,Diabetes mellitus  ,DM (diabetes mellitus)  ,GERD (gastroesophageal reflux disease)    HTN (hypertension)  ,Hypertension  ,Pneumonia  ,Quadriplegia  ,Respiratory failure ,s/p tracheostomy and peg .Found to have RYAN ,hypernatremia and lactate elevated Admit for iv hydration,monitor renal profile and urine output,nutritionist consult,prealbumin level,serial bmp,nutritional supplements, Palliative care consult requested ,to discuss advance directives and complete MOLST  (30 Oct 2020 07:14)      PAST MEDICAL & SURGICAL HISTORY:  Pneumonia    Quadriplegia    COVID-19 virus detected    Advanced dementia    DM (diabetes mellitus)    HTN (hypertension)    CVA (cerebral vascular accident)    Respiratory failure    Constipation    GERD (gastroesophageal reflux disease)    Hypertension    Respiratory failure    Diabetes mellitus    Aphasic stroke    Dementia of frontal lobe type    Feeding by G-tube    Tracheostomy tube present    Tracheostomy in place    Gastrostomy in place    Hx of appendectomy        FAMILY HISTORY:  No pertinent family history in first degree relatives          MEDICATIONS   ALBUTerol    90 MICROgram(s) HFA Inhaler 4 Puff(s) Inhalation every 6 hours  aspirin  chewable 81 milliGRAM(s) Oral daily  atropine 1% Ophthalmic Solution for SubLingual Use 1 Drop(s) SubLingual three times a day PRN  calcium carbonate 1250 mG  + Vitamin D (OsCal 500 + D) 1 Tablet(s) Oral two times a day  cefTRIAXone   IVPB 1000 milliGRAM(s) IV Intermittent every 24 hours  chlorhexidine 2% Cloths 1 Application(s) Topical daily  dextrose 40% Gel 15 Gram(s) Oral once PRN  dextrose 5%. 1000 milliLiter(s) IV Continuous <Continuous>  enoxaparin Injectable 40 milliGRAM(s) SubCutaneous daily  famotidine    Tablet 20 milliGRAM(s) Oral daily  fentaNYL   Patch  12 MICROgram(s)/Hr 1 Patch Transdermal every 72 hours  glucagon  Injectable 1 milliGRAM(s) IntraMuscular once PRN  insulin lispro (ADMELOG) corrective regimen sliding scale   SubCutaneous every 6 hours  ipratropium 17 MICROgram(s) HFA Inhaler 4 Puff(s) Inhalation every 6 hours  lactobacillus acidophilus 1 Tablet(s) Oral two times a day  potassium chloride   Powder 40 milliEquivalent(s) Enteral Tube once  saline laxative (FLEET) Rectal Enema 1 Enema Rectal at bedtime  sucralfate 1 Gram(s) Oral two times a day      Vital Signs Last 24 Hrs  T(C): 36.9 (05 Nov 2020 08:00), Max: 37.3 (05 Nov 2020 05:32)  T(F): 98.4 (05 Nov 2020 08:00), Max: 99.2 (05 Nov 2020 05:32)  HR: 92 (05 Nov 2020 09:00) (65 - 92)  BP: 135/63 (05 Nov 2020 09:00) (100/49 - 159/69)  BP(mean): 91 (05 Nov 2020 09:00) (70 - 101)  RR: 12 (05 Nov 2020 09:00) (12 - 23)  SpO2: 100% (05 Nov 2020 09:00) (99% - 100%)      11-04-20 @ 07:01  -  11-05-20 @ 07:00  --------------------------------------------------------  IN: 2270 mL / OUT: 950 mL / NET: 1320 mL    11-05-20 @ 07:01  -  11-05-20 @ 10:35  --------------------------------------------------------  IN: 50 mL / OUT: 0 mL / NET: 50 mL        Mode: AC/ CMV (Assist Control/ Continuous Mandatory Ventilation), RR (machine): 14, TV (machine): 400, FiO2: 30, PEEP: 5, ITime: 1, MAP: 9.4, PIP: 20    LABS:                        10.0   7.86  )-----------( 197      ( 05 Nov 2020 05:23 )             31.7     11-05    146<H>  |  112<H>  |  20  ----------------------------<  127<H>  3.2<L>   |  27  |  1.00    Ca    8.7      05 Nov 2020 05:23  Phos  3.8     11-05  Mg     2.2     11-05    TPro  6.3  /  Alb  2.5<L>  /  TBili  0.3  /  DBili  x   /  AST  18  /  ALT  27  /  AlkPhos  68  11-05              WBC:  WBC Count: 7.86 K/uL (11-05 @ 05:23)  WBC Count: 7.33 K/uL (11-04 @ 08:45)  WBC Count: 7.22 K/uL (11-03 @ 05:43)  WBC Count: 7.82 K/uL (11-02 @ 05:42)      MICROBIOLOGY:  RECENT CULTURES:  10-31 .Sputum Sputum Pseudomonas aeruginosa   Few Squamous epithelial cells per low power field  Few polymorphonuclear leukocytes per low power field  Rare Yeast like cells per oil power field  Rare Gram Negative Rods per oil power field   Mixed gram negative rods Culture includes  Few Pseudomonas aeruginosa  Normal Respiratory Elvira present    10-30 .Blood Blood-Peripheral Proteus mirabilis XXXX   No Growth Final                    Sodium:  Sodium, Serum: 146 mmol/L (11-05 @ 05:23)  Sodium, Serum: 146 mmol/L (11-04 @ 08:45)  Sodium, Serum: 147 mmol/L (11-03 @ 05:43)  Sodium, Serum: 143 mmol/L (11-02 @ 05:42)      1.00 mg/dL 11-05 @ 05:23  0.91 mg/dL 11-04 @ 08:45  1.00 mg/dL 11-03 @ 05:43  0.95 mg/dL 11-02 @ 05:42      Hemoglobin:  Hemoglobin: 10.0 g/dL (11-05 @ 05:23)  Hemoglobin: 10.5 g/dL (11-04 @ 08:45)  Hemoglobin: 11.4 g/dL (11-03 @ 05:43)  Hemoglobin: 11.1 g/dL (11-02 @ 05:42)      Platelets: Platelet Count - Automated: 197 K/uL (11-05 @ 05:23)  Platelet Count - Automated: 207 K/uL (11-04 @ 08:45)  Platelet Count - Automated: 212 K/uL (11-03 @ 05:43)  Platelet Count - Automated: 225 K/uL (11-02 @ 05:42)      LIVER FUNCTIONS - ( 05 Nov 2020 05:23 )  Alb: 2.5 g/dL / Pro: 6.3 g/dL / ALK PHOS: 68 U/L / ALT: 27 U/L / AST: 18 U/L / GGT: x                 RADIOLOGY & ADDITIONAL STUDIES:

## 2020-11-05 NOTE — PROGRESS NOTE ADULT - ASSESSMENT
73 yo Female sent from [facility] with respiratory distress. No report of fever or hypoxia. Patient trached/vented ,s/p peg  and non-verbal, unable to contribute to history. Transferred to [facility] from [place] [facility] for admission Admitted for septic workup and evaluation ,send blood and urine cx,serial lactate levels,monitor vitals closely hydration,monitor urine output and renal profile, iv abx initiated -s/p vanco and zosyn . BP stable upon [facility] ED arrival Med hx is significant for Advanced dementia ,s/p  Aphasic stroke    Constipation  ,COVID-19   ,CVA (cerebral vascular accident)  ,Dementia of frontal lobe type  ,Diabetes mellitus  ,DM (diabetes mellitus)  ,GERD (gastroesophageal reflux disease)    HTN (hypertension)  ,Hypertension  ,Pneumonia  ,Quadriplegia  ,Respiratory failure s/p tracheostomy and peg .Found to have RYAN ,hypernatremia and lactate elevated Admit for iv hydration,monitor renal profile and urine output,nutritionist consult,prealbumin level,serial bmp,nutritional supplements Palliative care consult requested ,to discuss advance directives and complete MOLST

## 2020-11-05 NOTE — PROGRESS NOTE ADULT - SUBJECTIVE AND OBJECTIVE BOX
[facility] DIVISION of INFECTIOUS DISEASE  Arturo Olivas MD PhD, Haylee Umanzor MD, Andressa Brantley MD, Billy Valenzuela MD  and providing coverage with Alexa Canas MD and Eusebio Chairez MD  Providing Infectious Disease Consultations at [facility], [facility], [facility]'s    Office# [phone] to schedule follow up appointments  Answering Service for urgent calls or New Consults [phone]  Cell# to text for urgent issues Arturo Olivas [phone]     infectious diseases progress note:    BREA BECKHAM is a 74y y. o. Female patient    Patient now tachycardic, tachypneic, diaphoretic    Allergies    codeine (Hives)    Intolerances        ANTIBIOTICS/RELEVANT:  antimicrobials  cefTRIAXone   IVPB 1000 milliGRAM(s) IV Intermittent every 24 hours    immunologic:    OTHER:  ALBUTerol    90 MICROgram(s) HFA Inhaler 4 Puff(s) Inhalation every 6 hours  aspirin  chewable 81 milliGRAM(s) Oral daily  atropine 1% Ophthalmic Solution for SubLingual Use 1 Drop(s) SubLingual three times a day PRN  calcium carbonate 1250 mG  + Vitamin D (OsCal 500 + D) 1 Tablet(s) Oral two times a day  chlorhexidine 2% Cloths 1 Application(s) Topical daily  dextrose 40% Gel 15 Gram(s) Oral once PRN  dextrose 5%. 1000 milliLiter(s) IV Continuous <Continuous>  enoxaparin Injectable 40 milliGRAM(s) SubCutaneous daily  famotidine    Tablet 20 milliGRAM(s) Oral daily  fentaNYL   Patch  12 MICROgram(s)/Hr 1 Patch Transdermal every 72 hours  glucagon  Injectable 1 milliGRAM(s) IntraMuscular once PRN  insulin lispro (ADMELOG) corrective regimen sliding scale   SubCutaneous every 6 hours  ipratropium 17 MICROgram(s) HFA Inhaler 4 Puff(s) Inhalation every 6 hours  lactobacillus acidophilus 1 Tablet(s) Oral two times a day  potassium chloride   Powder 40 milliEquivalent(s) Enteral Tube once  saline laxative (FLEET) Rectal Enema 1 Enema Rectal at bedtime  sucralfate 1 Gram(s) Oral two times a day      Objective:  Last 24-Vital Signs Last 24 Hrs  T(C): 36.9 (05 Nov 2020 08:00), Max: 37.3 (05 Nov 2020 05:32)  T(F): 98.4 (05 Nov 2020 08:00), Max: 99.2 (05 Nov 2020 05:32)  HR: 92 (05 Nov 2020 09:00) (65 - 92)  BP: 135/63 (05 Nov 2020 09:00) (100/49 - 159/69)  BP(mean): 91 (05 Nov 2020 09:00) (70 - 101)  RR: 12 (05 Nov 2020 09:00) (12 - 23)  SpO2: 100% (05 Nov 2020 09:00) (99% - 100%)    T(C): 36.9 (11-05-20 @ 08:00), Max: 37.3 (11-05-20 @ 05:32)  T(F): 98.4 (11-05-20 @ 08:00), Max: 99.2 (11-05-20 @ 05:32)  T(C): 36.9 (11-05-20 @ 08:00), Max: 37.3 (11-05-20 @ 05:32)  T(F): 98.4 (11-05-20 @ 08:00), Max: 99.2 (11-05-20 @ 05:32)  T(C): 36.9 (11-05-20 @ 08:00), Max: 37.3 (11-05-20 @ 05:32)  T(F): 98.4 (11-05-20 @ 08:00), Max: 99.2 (11-05-20 @ 05:32)    PHYSICAL EXAM:  Constitutional: Well-developed, well nourished, in distress  Eyes: PERRLA, EOMI  Ear/Nose/Throat: oropharynx normal	  Neck: no JVD, no lymphadenopathy, supple  Respiratory: noted accessory muscle use, lung fields bilaterally coarse symmetrical BS  Cardiovascular: Rapid  Gastrointestinal: soft, NT, no HSM, BS-normal  Extremities: no clubbing, no cyanosis, edema absent  Neuro: patient alert,       LABS:                        10.0   7.86  )-----------( 197      ( 05 Nov 2020 05:23 )             31.7       WBC 7.86  11-05 @ 05:23  WBC 7.33  11-04 @ 08:45  WBC 7.22  11-03 @ 05:43  WBC 7.82  11-02 @ 05:42  WBC 11.74  11-01 @ 05:46  WBC 6.31  10-31 @ 05:07  WBC 10.22  10-30 @ 06:48  WBC 20.99  10-29 @ 22:39      11-05    146<H>  |  112<H>  |  20  ----------------------------<  127<H>  3.2<L>   |  27  |  1.00    Ca    8.7      05 Nov 2020 05:23  Phos  3.8     11-05  Mg     2.2     11-05    TPro  6.3  /  Alb  2.5<L>  /  TBili  0.3  /  DBili  x   /  AST  18  /  ALT  27  /  AlkPhos  68  11-05      Creatinine, Serum: 1.00 mg/dL (11-05-20 @ 05:23)  Creatinine, Serum: 0.91 mg/dL (11-04-20 @ 08:45)  Creatinine, Serum: 1.00 mg/dL (11-03-20 @ 05:43)  Creatinine, Serum: 0.95 mg/dL (11-02-20 @ 05:42)  Creatinine, Serum: 1.00 mg/dL (11-01-20 @ 05:46)  Creatinine, Serum: 1.00 mg/dL (10-31-20 @ 05:07)  Creatinine, Serum: 1.40 mg/dL (10-30-20 @ 06:48)  Creatinine, Serum: 2.29 mg/dL (10-29-20 @ 22:39)                MICROBIOLOGY:              RADIOLOGY & ADDITIONAL STUDIES:

## 2020-11-05 NOTE — DISCHARGE NOTE PROVIDER - NSDCHC_MEDRECSTATUS_GEN_ALL_CORE
[facility] called for transfer to [facility].   Admission Reconciliation is Not Complete  Discharge Reconciliation is Not Complete Admission Reconciliation is Completed  Discharge Reconciliation is Completed

## 2020-11-05 NOTE — PROGRESS NOTE ADULT - SUBJECTIVE AND OBJECTIVE BOX
Patient is a 74y old  Female who presents with a chief complaint of fever (04 Nov 2020 07:19)    24 hour events: No acute events overnight.     REVIEW OF SYSTEMS  Unable to assess 2/2 mental status    T(F): 99.2 (11-05-20 @ 05:32), Max: 99.2 (11-05-20 @ 05:32)  HR: 74 (11-05-20 @ 07:00) (65 - 84)  BP: 123/60 (11-05-20 @ 07:00) (100/49 - 159/69)  RR: 17 (11-05-20 @ 07:00) (14 - 23)  SpO2: 100% (11-05-20 @ 07:00) (99% - 100%)  Wt(kg): --    Mode: AC/ CMV (Assist Control/ Continuous Mandatory Ventilation), RR (machine): 14, TV (machine): 400, FiO2: 30, PEEP: 5        I&O's Summary    11-04 @ 07:01  -  11-05 @ 07:00  --------------------------------------------------------  IN: 2270 mL / OUT: 950 mL / NET: 1320 mL      PHYSICAL EXAM  General: frail appearing woman, awake but unresponsive, intubated, trached  CNS: contracted bedbound, does not follow commands, withdraws from painful stimuli, opens eyes to name  HEENT: PERRL  Resp: CTABL, trache sight clean, no discharge  CVS: rrr no m/r/g  Abd: soft, nontender, mildly distended. PEG site clean, no erythema, discharge  Ext: No cyanosis, edema  Skin: warm & perfused    MEDICATIONS  cefTRIAXone   IVPB IV Intermittent      dextrose 40% Gel Oral PRN  glucagon  Injectable IntraMuscular PRN  insulin lispro (ADMELOG) corrective regimen sliding scale SubCutaneous    ALBUTerol    90 MICROgram(s) HFA Inhaler Inhalation  ipratropium 17 MICROgram(s) HFA Inhaler Inhalation    fentaNYL   Patch  12 MICROgram(s)/Hr Transdermal      aspirin  chewable Oral  enoxaparin Injectable SubCutaneous    famotidine    Tablet Oral  saline laxative (FLEET) Rectal Enema Rectal  sucralfate Oral      calcium carbonate 1250 mG  + Vitamin D (OsCal 500 + D) Oral  dextrose 5%. IV Continuous      atropine 1% Ophthalmic Solution for SubLingual Use SubLingual PRN  chlorhexidine 2% Cloths Topical    lactobacillus acidophilus Oral                          10.0   7.86  )-----------( 197      ( 05 Nov 2020 05:23 )             31.7       11-05    146<H>  |  112<H>  |  20  ----------------------------<  127<H>  3.2<L>   |  27  |  1.00    Ca    8.7      05 Nov 2020 05:23  Phos  3.8     11-05  Mg     2.2     11-05    TPro  6.3  /  Alb  2.5<L>  /  TBili  0.3  /  DBili  x   /  AST  18  /  ALT  27  /  AlkPhos  68  11-05                    Radiology: ***  Bedside lung ultrasound: ***  Bedside ECHO: ***    CENTRAL LINE: Y/N          DATE INSERTED:              REMOVE: Y/N  REID: Y/N                        DATE INSERTED:              REMOVE: Y/N  A-LINE: Y/N                       DATE INSERTED:              REMOVE: Y/N    GLOBAL ISSUE/BEST PRACTICE  Analgesia:   Sedation:   CAM-ICU:   HOB elevation: yes  Stress ulcer prophylaxis:   VTE prophylaxis:   Glycemic control:   Nutrition:     CODE STATUS: ***  [facility] [place] discussion: Y       Patient is a 74y old  Female who presents with a chief complaint of fever (04 Nov 2020 07:19)    24 hour events: No acute events overnight.     REVIEW OF SYSTEMS  Unable to assess 2/2 mental status    T(F): 99.2 (11-05-20 @ 05:32), Max: 99.2 (11-05-20 @ 05:32)  HR: 74 (11-05-20 @ 07:00) (65 - 84)  BP: 123/60 (11-05-20 @ 07:00) (100/49 - 159/69)  RR: 17 (11-05-20 @ 07:00) (14 - 23)  SpO2: 100% (11-05-20 @ 07:00) (99% - 100%)      Mode: AC/ CMV (Assist Control/ Continuous Mandatory Ventilation), RR (machine): 14, TV (machine): 400, FiO2: 30, PEEP: 5        I&O's Summary    11-04 @ 07:01  -  11-05 @ 07:00  --------------------------------------------------------  IN: 2270 mL / OUT: 950 mL / NET: 1320 mL      PHYSICAL EXAM  General: frail appearing woman, awake but unresponsive, intubated, trached  CNS: contracted bedbound, does not follow commands, withdraws from painful stimuli, opens eyes to name  HEENT: PERRL  Resp: CTABL, trache sight clean, no discharge  CVS: rrr no m/r/g  Abd: soft, nontender, mildly distended. PEG site clean, no erythema, discharge  Ext: No cyanosis, edema  Skin: warm & perfused    MEDICATIONS  cefTRIAXone   IVPB IV Intermittent      dextrose 40% Gel Oral PRN  glucagon  Injectable IntraMuscular PRN  insulin lispro (ADMELOG) corrective regimen sliding scale SubCutaneous    ALBUTerol    90 MICROgram(s) HFA Inhaler Inhalation  ipratropium 17 MICROgram(s) HFA Inhaler Inhalation    fentaNYL   Patch  12 MICROgram(s)/Hr Transdermal      aspirin  chewable Oral  enoxaparin Injectable SubCutaneous    famotidine    Tablet Oral  saline laxative (FLEET) Rectal Enema Rectal  sucralfate Oral      calcium carbonate 1250 mG  + Vitamin D (OsCal 500 + D) Oral  dextrose 5%. IV Continuous      atropine 1% Ophthalmic Solution for SubLingual Use SubLingual PRN  chlorhexidine 2% Cloths Topical    lactobacillus acidophilus Oral                          10.0   7.86  )-----------( 197      ( 05 Nov 2020 05:23 )             31.7       11-05    146<H>  |  112<H>  |  20  ----------------------------<  127<H>  3.2<L>   |  27  |  1.00    Ca    8.7      05 Nov 2020 05:23  Phos  3.8     11-05  Mg     2.2     11-05    TPro  6.3  /  Alb  2.5<L>  /  TBili  0.3  /  DBili  x   /  AST  18  /  ALT  27  /  AlkPhos  68  11-05                    Radiology: no new imaging  Bedside lung ultrasound: N/A  Bedside ECHO: N/A    CENTRAL LINE: N  REID: N  A-LINE: N                 GLOBAL ISSUE/BEST PRACTICE  Analgesia: Y  Sedation: N  HOB elevation: yes  Stress ulcer prophylaxis: Y  VTE prophylaxis: Y  Glycemic control: Y  Nutrition: glucerna through PEG    CODE STATUS: FULL

## 2020-11-05 NOTE — PROGRESS NOTE ADULT - ASSESSMENT
cont rx   cont rx      CHAPINCITO GUIDRY  DOA 10/30/2020 DR ILIANA KEMP       REVIEW OF SYMPTOMS      Able to give ROS  NO     PHYSICAL EXAM    HEENT Unremarkable PERRLA atraumatic   RESP Fair air entry EXP prolonged    Harsh breath sound Resp distres mild   CARDIAC S1 S2 No S3     NO JVD    ABDOMEN SOFT BS PRESENT NOT DISTENDED No hepatosplenomegaly PEDAL EDEMA present No calf tenderness  NO rash     PT DATA/BEST PRACTICE  ALLERGY        codeine       WT                             10/30/2020 56   BMI                                  10/30/2020 22      ADVANCED DIRECTIVE     HEAD OF BED ELEVATION Yes      DVT PROPHYLAXIS.   LVNX 30 (10/30) --> lvnx 40 (10/31)   DIET. glucerna 1.5 1000 (10/3)   FREE WATER 250.4 (11/1) (Dr NICHOLAS)                                                                 SQUIRES PROPHYLAXIS. FAMOTIDINE 20 (10/30)  INFECTION PPLX          chlorhexidine 4% (10/30)                                           OXYGENATION.         VITALS.   11/5/2020 afeb 75 170/70   11/5/2020 ac 14/400/5/.3       PATIENT SUMMARY.   74 yr female hx of dm, quadaplegic,  resp failure on chronic vent , htn, peg, CVA, advanced dementia, recent admission for +++COVID , d/c to vent facility with neg covid, now with resp distress, sepsis, hypotensive  , most likely UTI  sepsis   Pulm consulted 10/30      PROBLEM/ASSESSMENT/RECOMMENDATIONS.  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

## 2020-11-05 NOTE — DISCHARGE NOTE PROVIDER - HOSPITAL COURSE
75 yo Female sent from [facility] with respiratory distress. No report of fever or hypoxia. Patient trached/vented ,s/p peg  and non-verbal, unable to contribute to history. Transferred to [place] from [place] ED for admission Admitted for septic workup and evaluation ,send blood and urine cx,serial lactate levels,monitor vitals closely hydration,monitor urine output and renal profile, iv abx initiated -s/p vanco and zosyn . BP stable upon [place] ED arrival Med hx is significant for Advanced dementia ,s/p  Aphasic stroke    Constipation  ,COVID-19   ,CVA (cerebral vascular accident)  ,Dementia of frontal lobe type  ,Diabetes mellitus  ,DM (diabetes mellitus)  ,GERD (gastroesophageal reflux disease)    HTN (hypertension)  ,Hypertension  ,Pneumonia  ,Quadriplegia  ,Respiratory failure s/p tracheostomy and peg .Found to have RYAN ,hypernatremia and lactate elevated Admit for iv hydration,monitor renal profile and urine output,nutritionist consult,prealbumin level,serial bmp,nutritional supplements Palliative care consult requested ,to discuss advance directives and complete MOLST     Problem/Plan - 1:  ·  Problem: Sepsis, due to unspecified organism, unspecified whether acute organ dysfunction present.  Plan: DEESCALATED TO CEFTRIAXONE WITH PLAN FOR 14 DAYS OF THERAPY .RULED IN FOR SEPSIS LIKELY SECONDARY TO UTI ,POA .last day of abx 11/12 ,will discharge back to [facility] on iv abx if remains hemodynamically stable.     Problem/Plan - 2:  ·  Problem: Respiratory failure.  Plan: ventilator management as per icu team and pulm consult ,serial abgs and chest xrays ,head of bed elevated 30 degrees ,nebulized BD.     Problem/Plan - 3:  ·  Problem: Urinary tract infection without hematuria, site unspecified.  Plan: DEESCALATED TO CEFTRIAXONE WITH PLAN FOR 14 DAYS OF THERAPY .RULED IN FOR SEPSIS LIKELY SECONDARY TO UTI ,POA.     Problem/Plan - 4:  ·  Problem: RYAN (acute kidney injury).  Plan: 2/2 TO sepsis with lactic acidosis , continue IV FLUIDS serial bmp ,ins/outs , nutritionist  consult ,nephrology eval.     Problem/Plan - 5:  ·  Problem: DM (diabetes mellitus).  Plan: corrective regimen sliding scale.     Problem/Plan - 6:  Problem: HTN (hypertension). Plan: seen by cardiologist Admitted  to intensive care unit for monitoring , send 3 sets of cardiac enzymes to rule out acute coronary event, obtain ECHO to evaluate LVEF, cardiology consult  ,continue current management, O2 supply, anticoagulation plan as per cardiology consult.    Problem/Plan - 7:  ·  Problem: Quadriplegia.  Plan: OOB TC ,PT/OT.     Problem/Plan - 8:  ·  Problem: Advanced dementia.  Plan: supportive care Palliative care consult requested ,to discuss advance directives and complete MOLST.     Problem/Plan - 9:  ·  Problem: Prophylactic measure.  Plan: Gastrointestinal stress ulcer prophylaxis and DVT prophylaxis administered.

## 2020-11-05 NOTE — DISCHARGE NOTE PROVIDER - CARE PROVIDER_API CALL
TAMICA JENSEN  Internal Medicine  [address] [place] [address]  Phone: [phone]  Fax: [phone]  Follow Up Time: 1-3 days    Heriberto Andrea)  Critical Care Medicine; HospicePalliative Medicine; Internal Medicine; Pulmonary Disease  [address] [place] [address]  Phone: [phone]  Fax: [phone]  Follow Up Time: 1 week

## 2020-11-06 ENCOUNTER — TRANSCRIPTION ENCOUNTER (OUTPATIENT)
Age: 75
End: 2020-11-06

## 2020-11-06 VITALS
OXYGEN SATURATION: 100 % | HEART RATE: 78 BPM | SYSTOLIC BLOOD PRESSURE: 147 MMHG | RESPIRATION RATE: 26 BRPM | DIASTOLIC BLOOD PRESSURE: 87 MMHG

## 2020-11-06 PROCEDURE — 99285 EMERGENCY DEPT VISIT HI MDM: CPT

## 2020-11-06 PROCEDURE — 80048 BASIC METABOLIC PNL TOTAL CA: CPT

## 2020-11-06 PROCEDURE — 87186 SC STD MICRODIL/AGAR DIL: CPT

## 2020-11-06 PROCEDURE — 84484 ASSAY OF TROPONIN QUANT: CPT

## 2020-11-06 PROCEDURE — 82803 BLOOD GASES ANY COMBINATION: CPT

## 2020-11-06 PROCEDURE — 86769 SARS-COV-2 COVID-19 ANTIBODY: CPT

## 2020-11-06 PROCEDURE — 84443 ASSAY THYROID STIM HORMONE: CPT

## 2020-11-06 PROCEDURE — 82962 GLUCOSE BLOOD TEST: CPT

## 2020-11-06 PROCEDURE — 85025 COMPLETE CBC W/AUTO DIFF WBC: CPT

## 2020-11-06 PROCEDURE — 94003 VENT MGMT INPAT SUBQ DAY: CPT

## 2020-11-06 PROCEDURE — 94640 AIRWAY INHALATION TREATMENT: CPT

## 2020-11-06 PROCEDURE — 83735 ASSAY OF MAGNESIUM: CPT

## 2020-11-06 PROCEDURE — 86901 BLOOD TYPING SEROLOGIC RH(D): CPT

## 2020-11-06 PROCEDURE — 86900 BLOOD TYPING SEROLOGIC ABO: CPT

## 2020-11-06 PROCEDURE — 71045 X-RAY EXAM CHEST 1 VIEW: CPT

## 2020-11-06 PROCEDURE — 80053 COMPREHEN METABOLIC PANEL: CPT

## 2020-11-06 PROCEDURE — 93005 ELECTROCARDIOGRAM TRACING: CPT

## 2020-11-06 PROCEDURE — 99053 MED SERV 10PM-8AM 24 HR FAC: CPT

## 2020-11-06 PROCEDURE — 94002 VENT MGMT INPAT INIT DAY: CPT

## 2020-11-06 PROCEDURE — 99238 HOSP IP/OBS DSCHRG MGMT 30/<: CPT

## 2020-11-06 PROCEDURE — 36415 COLL VENOUS BLD VENIPUNCTURE: CPT

## 2020-11-06 PROCEDURE — 99221 1ST HOSP IP/OBS SF/LOW 40: CPT

## 2020-11-06 PROCEDURE — 83880 ASSAY OF NATRIURETIC PEPTIDE: CPT

## 2020-11-06 PROCEDURE — 99231 SBSQ HOSP IP/OBS SF/LOW 25: CPT

## 2020-11-06 PROCEDURE — 83036 HEMOGLOBIN GLYCOSYLATED A1C: CPT

## 2020-11-06 PROCEDURE — 86850 RBC ANTIBODY SCREEN: CPT

## 2020-11-06 PROCEDURE — 85027 COMPLETE CBC AUTOMATED: CPT

## 2020-11-06 PROCEDURE — 84100 ASSAY OF PHOSPHORUS: CPT

## 2020-11-06 PROCEDURE — 93306 TTE W/DOPPLER COMPLETE: CPT

## 2020-11-06 PROCEDURE — 94799 UNLISTED PULMONARY SVC/PX: CPT

## 2020-11-06 PROCEDURE — 83605 ASSAY OF LACTIC ACID: CPT

## 2020-11-06 PROCEDURE — 87070 CULTURE OTHR SPECIMN AEROBIC: CPT

## 2020-11-06 PROCEDURE — U0003: CPT

## 2020-11-06 RX ORDER — FAMOTIDINE 10 MG/ML
1 INJECTION INTRAVENOUS
Qty: 0 | Refills: 0 | DISCHARGE
Start: 2020-11-06

## 2020-11-06 RX ORDER — ASPIRIN/CALCIUM CARB/MAGNESIUM 324 MG
1 TABLET ORAL
Qty: 0 | Refills: 0 | DISCHARGE
Start: 2020-11-06

## 2020-11-06 RX ORDER — ALBUTEROL 90 UG/1
4 AEROSOL, METERED ORAL
Qty: 0 | Refills: 0 | DISCHARGE
Start: 2020-11-06

## 2020-11-06 RX ORDER — SUCRALFATE 1 G
1 TABLET ORAL
Qty: 0 | Refills: 0 | DISCHARGE

## 2020-11-06 RX ORDER — IPRATROPIUM BROMIDE 0.2 MG/ML
4 SOLUTION, NON-ORAL INHALATION
Qty: 0 | Refills: 0 | DISCHARGE
Start: 2020-11-06

## 2020-11-06 RX ORDER — POTASSIUM CHLORIDE 20 MEQ
40 PACKET (EA) ORAL EVERY 4 HOURS
Refills: 0 | Status: COMPLETED | OUTPATIENT
Start: 2020-11-06 | End: 2020-11-06

## 2020-11-06 RX ORDER — CEFTRIAXONE 500 MG/1
1 INJECTION, POWDER, FOR SOLUTION INTRAMUSCULAR; INTRAVENOUS
Qty: 0 | Refills: 0 | DISCHARGE
Start: 2020-11-06

## 2020-11-06 RX ORDER — FAMOTIDINE 10 MG/ML
2 INJECTION INTRAVENOUS
Qty: 0 | Refills: 0 | DISCHARGE

## 2020-11-06 RX ORDER — FENTANYL CITRATE 50 UG/ML
1 INJECTION INTRAVENOUS
Qty: 0 | Refills: 0 | DISCHARGE
Start: 2020-11-06

## 2020-11-06 RX ORDER — FENTANYL CITRATE 50 UG/ML
1 INJECTION INTRAVENOUS
Qty: 0 | Refills: 0 | DISCHARGE

## 2020-11-06 RX ORDER — INSULIN LISPRO 100/ML
0 VIAL (ML) SUBCUTANEOUS
Qty: 0 | Refills: 0 | DISCHARGE
Start: 2020-11-06

## 2020-11-06 RX ORDER — ENOXAPARIN SODIUM 100 MG/ML
48 INJECTION SUBCUTANEOUS
Qty: 0 | Refills: 0 | DISCHARGE

## 2020-11-06 RX ORDER — SUCRALFATE 1 G
1 TABLET ORAL
Qty: 0 | Refills: 0 | DISCHARGE
Start: 2020-11-06

## 2020-11-06 RX ORDER — INSULIN LISPRO 100/ML
8 VIAL (ML) SUBCUTANEOUS
Qty: 0 | Refills: 0 | DISCHARGE

## 2020-11-06 RX ORDER — MULTIVIT-MIN/FERROUS GLUCONATE 9 MG/15 ML
0 LIQUID (ML) ORAL
Qty: 0 | Refills: 0 | DISCHARGE
Start: 2020-11-06

## 2020-11-06 RX ADMIN — Medication 40 MILLIEQUIVALENT(S): at 13:58

## 2020-11-06 RX ADMIN — ALBUTEROL 4 PUFF(S): 90 AEROSOL, METERED ORAL at 00:42

## 2020-11-06 RX ADMIN — FAMOTIDINE 20 MILLIGRAM(S): 10 INJECTION INTRAVENOUS at 11:11

## 2020-11-06 RX ADMIN — Medication 1 TABLET(S): at 05:16

## 2020-11-06 RX ADMIN — CEFTRIAXONE 100 MILLIGRAM(S): 500 INJECTION, POWDER, FOR SOLUTION INTRAMUSCULAR; INTRAVENOUS at 11:11

## 2020-11-06 RX ADMIN — ALBUTEROL 4 PUFF(S): 90 AEROSOL, METERED ORAL at 13:49

## 2020-11-06 RX ADMIN — Medication 1 GRAM(S): at 05:16

## 2020-11-06 RX ADMIN — Medication 2: at 00:43

## 2020-11-06 RX ADMIN — CHLORHEXIDINE GLUCONATE 1 APPLICATION(S): 213 SOLUTION TOPICAL at 12:05

## 2020-11-06 RX ADMIN — Medication 4 PUFF(S): at 00:42

## 2020-11-06 RX ADMIN — FENTANYL CITRATE 1 PATCH: 50 INJECTION INTRAVENOUS at 07:08

## 2020-11-06 RX ADMIN — ALBUTEROL 4 PUFF(S): 90 AEROSOL, METERED ORAL at 07:40

## 2020-11-06 RX ADMIN — ENOXAPARIN SODIUM 40 MILLIGRAM(S): 100 INJECTION SUBCUTANEOUS at 11:11

## 2020-11-06 RX ADMIN — Medication 4 PUFF(S): at 13:48

## 2020-11-06 RX ADMIN — Medication 4 PUFF(S): at 07:40

## 2020-11-06 RX ADMIN — Medication 40 MILLIEQUIVALENT(S): at 11:10

## 2020-11-06 RX ADMIN — Medication 81 MILLIGRAM(S): at 11:11

## 2020-11-06 RX ADMIN — Medication 2: at 12:06

## 2020-11-06 NOTE — PROGRESS NOTE ADULT - ATTENDING COMMENTS
74F PMH Frontotemporal dementia, CVA, chronic respiratory failure s/p trach, vent-dependent, bedbound, dysphagia s/p PEG, recurrent UTI/VAP presents with severe sepsis secondary to Proteus mirabilis UTI, also with neutrophilic leukocytosis, RYAN, lactic acidosis, and demand ischemia.    1. Neuro: mental status at baseline, remains unresponsive, continue fentanyl patch  2. CV: HD stable, continue aspirin 81 mg daily. 2D-echo with diastolic dysfunction, mild MR  3. Pulm: continue lung protective ventilation, currently tolerating pressure support 8/5. Sputum culture with few mixed GNR, f/up final C&S. Continue albuterol-ipratropium q6h and tobramycin nebs bid given h/o pseudomonas colonization. ABG without hypercapnia  4. GI: continue Glucerna tube feeds. Continue famotidine and sucralfate. Bowel regimen with metamucil and fleet enema. Lactobacillus  5. Renal: RYAN and lactic acidosis resolved. Strict I/O's, trend kidney function and lytes  6. ID: Proteus mirabilis UTI, f/up full C&S. Continue Zosyn  7. Endo: DM2, continue insulin coverage scale. A1C 6.6  8. Heme: DVT ppx with enoxaparin  9. Skin: no lines, chronic vaughn for urinary retention  10. Dispo: full code, discussed with , poor overall prognosis
74F PMH Frontotemporal dementia, CVA, chronic respiratory failure s/p trach, vent-dependent, bedbound, dysphagia s/p PEG, recurrent UTI/VAP presents with severe sepsis secondary to Pseudomonas aeruginosa UTI, also with neutrophilic leukocytosis, RYAN, lactic acidosis, and demand ischemia.    1. Neuro: mental status at baseline, remains unresponsive, continue fentanyl patch  2. CV: HD stable, continue aspirin 81 mg daily  3. Pulm: continue lung protective ventilation, currently tolerating pressure support 8/5. Follow-up sputum culture, gram stain with GNR. Continue albuterol-ipratropium q6h and tobramycin nebs bid given h/o pseudomonas colonization. ABG without hypercapnia  4. GI: continue glucerna tube feeds. Continue famotidine and sucralfate. Bowel regimen with metamucil and fleet enema. Lactobacillus  5. Renal: RYAN resolved, improved lactic acidosis (8.7-->2.8). Strict I/O's, trend kidney function and lytes  6. ID: pseudomonas UTI per culture at [facility], no sensitivities yet, f/up blood and urine cultures. Continue Zosyn  7. Endo: DM2, continue insulin coverage scale. A1C 6.6  8. Heme: DVT ppx with enoxaparin  9. Skin: no lines, chronic vaughn for urinary retention  10. Dispo: full code, discussed with , poor overall prognosis  CC time spent: 35 min
74F PMH Frontotemporal dementia, CVA, chronic vent dependent respiratory failure, dysphagia s/p PEG, recurrent UTI/VAP presents with severe sepsis secondary to Proteus mirabilis UTI, resulting in RYAN, lactic acidosis, and demand ischemia.  Now improved.    --severe dementia, mental status at baseline  fentanyl patch for pain control  --hemodynamically stable, continue ASA for cardioprotection  --chronic respiratory failure  continue full vent support  --dysphagia, continue TF via PEG  --normal renal function  --proteus UTI, continue CTX until 11/12  can change to IM upon discharge  --DM2, continue insulin coverage scale  --plan discussed with   --stable for d/c to NH tomorrow
74F PMH Frontotemporal dementia, CVA, chronic vent dependent respiratory failure, dysphagia s/p PEG, recurrent UTI/VAP presents with severe sepsis secondary to Proteus mirabilis UTI, resulting in RYAN, lactic acidosis, and demand ischemia.  Now improved.    --severe dementia, mental status at baseline  fentanyl patch for pain control  --hemodynamically stable, continue ASA for cardioprotection  --chronic respiratory failure  continue full vent support  --dysphagia, continue TF via PEG  --normal renal function  --proteus UTI, continue CTX until 11/12  can change to IM upon discharge  --DM2, continue insulin coverage scale  --stable for d/c to NH today
74F PMH Frontotemporal dementia, CVA, chronic respiratory failure s/p trach, vent-dependent, bedbound, dysphagia s/p PEG, recurrent UTI/VAP presents with severe sepsis secondary to Pseudomonas aeruginosa UTI, also with neutrophilic leukocytosis, RYAN, lactic acidosis, and demand ischemia.    1. Neuro: mental status at baseline, continue fentanyl patch  2. CV: HD stable, continue aspirin 81 mg daily  3. Pulm: continue lung protective ventilation, check sputum culture for increased secretions. Start albuterol-ipratropium q6h and tobramycin nebs bid given h/o pseudomonas colonization. ABG without hypercapnia  4. GI: start glucerna tube feeds. Continue famotidine and sucralfate. Bowel regimen with metamucil and fleet enema. Lactobacillus  5. Renal: RYAN resolving, improved lactic acidosis, strict I/O's, trend kidney function and lytes  6. ID: pseudomonas UTI per culture at [facility], no sensitivities yet, f/up blood and urine cultures. Continue Zosyn  7. Endo: DM2, continue insulin coverage scale. Episode of hypoglycemia this morning s/p D50. Check A1C in AM  8. Heme: DVT ppx with enoxaparin  9. Skin: no lines, chronic vaughn for urinary retention  10. Dispo: full code, discussed with  at bedside  CC time spent: 35 min
74F PMH Frontotemporal dementia, CVA, chronic vent dependent respiratory failure, dysphagia s/p PEG, recurrent UTI/VAP presents with severe sepsis secondary to Proteus mirabilis UTI, resulting in RYAN, lactic acidosis, and demand ischemia.  Now improved.    --severe dementia, mental status at baseline  fentanyl patch for pain control  --hemodynamically stable, continue ASA for cardioprotection  --chronic respiratory failure  continue full vent support  --dysphagia, continue TF via PEG  --normal renal function  --proteus UTI, continue CTX  --DM2, continue insulin coverage scale  --plan discussed with 
74F PMH Frontotemporal dementia, CVA, chronic vent dependent respiratory failure, dysphagia s/p PEG, recurrent UTI/VAP presents with severe sepsis secondary to Proteus mirabilis UTI, resulting in RYAN, lactic acidosis, and demand ischemia.  Now improved.    --severe dementia, mental status at baseline  fentanyl patch for pain control  --htn, continue prn hydralazine, ASA  --chronic respiratory failure  continue full vent support  --dysphagia, continue TF via PEG  --RYAN resolved   hypernatremia improved  lactic acidosis resolved  --proteus UTI, continue CTX  few pseudomonas on sputum Cx but suspect colonizer  --DM2, continue insulin coverage scale  --plan discussed with 
74F PMH Frontotemporal dementia, CVA, chronic vent dependent respiratory failure, dysphagia s/p PEG, recurrent UTI/VAP presents with severe sepsis secondary to Proteus mirabilis UTI, resulting in RYAN, lactic acidosis, and demand ischemia.  Now improved.    --severe dementia, mental status at baseline  fentanyl patch for pain control  --htn, continue prn hydralazine, ASA  --chronic respiratory failure  continue full vent support  --dysphagia, continue TF via PEG  --normal renal function  --proteus UTI, continue CTX  few pseudomonas on sputum Cx but suspect colonizer  --DM2, continue insulin coverage scale
73 yo Female sent from [facility] with respiratory distress. No report of fever or hypoxia. Patient trached/vented ,s/p peg  and non-verbal, unable to contribute to history. Transferred to [place] from [place] ED for admission Admitted for septic workup and evaluation ,send blood and urine cx,serial lactate levels,monitor vitals closely hydration, monitor urine output and renal profile, iv abx initiated -s/p vanco and zosyn . BP stable upon [place] ED arrival Med hx is significant for Advanced dementia ,s/p  Aphasic stroke  ,Constipation  ,COVID-19   ,CVA (cerebral vascular accident)  ,Dementia of frontal lobe type  ,Diabetes mellitus  ,DM (diabetes mellitus)  ,GERD (gastroesophageal reflux disease)  ,HTN (hypertension)  ,Hypertension  ,Pneumonia  ,Quadriplegia  ,Respiratory failure s/p tracheostomy and peg .Found to have RYAN ,hypernatremia and lactate elevated Admit for iv hydration,monitor renal profile and urine output,nutritionist consult,prealbumin level,serial bmp,nutritional supplements Palliative care consult requested ,to discuss advance directives and complete MOLST
73 yo Female sent from [facility] with respiratory distress. No report of fever or hypoxia. Patient trached/vented ,s/p peg  and non-verbal, unable to contribute to history. Transferred to [place] from [place] ED for admission Admitted for septic workup and evaluation ,send blood and urine cx,serial lactate levels,monitor vitals closely hydration, monitor urine output and renal profile, iv abx initiated -s/p vanco and zosyn . BP stable upon [place] ED arrival Med hx is significant for Advanced dementia ,s/p  Aphasic stroke  ,Constipation  ,COVID-19   ,CVA (cerebral vascular accident)  ,Dementia of frontal lobe type  ,Diabetes mellitus  ,DM (diabetes mellitus)  ,GERD (gastroesophageal reflux disease)  ,HTN (hypertension)  ,Hypertension  ,Pneumonia  ,Quadriplegia  ,Respiratory failure s/p tracheostomy and peg .Found to have RYAN ,hypernatremia and lactate elevated Admit for iv hydration,monitor renal profile and urine output,nutritionist consult,prealbumin level,serial bmp,nutritional supplements Palliative care consult requested ,to discuss advance directives and complete MOLST
73 yo Female sent from [facility] with respiratory distress. No report of fever or hypoxia. Patient trached/vented ,s/p peg  and non-verbal, unable to contribute to history. Transferred to [place] from [place] ED for admission Admitted for septic workup and evaluation ,send blood and urine cx,serial lactate levels,monitor vitals closely hydration, monitor urine output and renal profile, iv abx initiated -s/p vanco and zosyn . BP stable upon [place] ED arrival Med hx is significant for Advanced dementia ,s/p  Aphasic stroke  ,Constipation  ,COVID-19   ,CVA (cerebral vascular accident)  ,Dementia of frontal lobe type  ,Diabetes mellitus  ,DM (diabetes mellitus)  ,GERD (gastroesophageal reflux disease)  ,HTN (hypertension)  ,Hypertension  ,Pneumonia  ,Quadriplegia  ,Respiratory failure s/p tracheostomy and peg .Found to have RYAN ,hypernatremia and lactate elevated Admit for iv hydration,monitor renal profile and urine output,nutritionist consult,prealbumin level,serial bmp,nutritional supplements Palliative care consult requested ,to discuss advance directives and complete MOLST
73 yo Female sent from [facility] with respiratory distress. No report of fever or hypoxia. Patient trached/vented ,s/p peg  and non-verbal, unable to contribute to history. Transferred to [place] from [place] ED for admission Admitted for septic workup and evaluation ,send blood and urine cx,serial lactate levels,monitor vitals closely hydration, monitor urine output and renal profile, iv abx initiated -s/p vanco and zosyn . BP stable upon [place] ED arrival Med hx is significant for Advanced dementia ,s/p  Aphasic stroke  ,Constipation  ,COVID-19   ,CVA (cerebral vascular accident)  ,Dementia of frontal lobe type  ,Diabetes mellitus  ,DM (diabetes mellitus)  ,GERD (gastroesophageal reflux disease)  ,HTN (hypertension)  ,Hypertension  ,Pneumonia  ,Quadriplegia  ,Respiratory failure s/p tracheostomy and peg .Found to have RYAN ,hypernatremia and lactate elevated Admit for iv hydration,monitor renal profile and urine output,nutritionist consult,prealbumin level,serial bmp,nutritional supplements Palliative care consult requested ,to discuss advance directives and complete MOLST
75 yo Female sent from [facility] with respiratory distress. No report of fever or hypoxia. Patient trached/vented ,s/p peg  and non-verbal, unable to contribute to history. Transferred to [place] from [place] ED for admission Admitted for septic workup and evaluation ,send blood and urine cx,serial lactate levels,monitor vitals closely hydration,monitor urine output and renal profile, iv abx initiated -s/p vanco and zosyn . BP stable upon [place] ED arrival Med hx is significant for Advanced dementia ,s/p  Aphasic stroke  ,Constipation  ,COVID-19   ,CVA (cerebral vascular accident)  ,Dementia of frontal lobe type  ,Diabetes mellitus  ,DM (diabetes mellitus)  ,GERD (gastroesophageal reflux disease)  ,HTN (hypertension)  ,Hypertension  ,Pneumonia  ,Quadriplegia  ,Respiratory failure s/p tracheostomy and peg .Found to have RYAN ,hypernatremia and lactate elevated Admit for iv hydration,monitor renal profile and urine output,nutritionist consult,prealbumin level,serial bmp,nutritional supplements Palliative care consult requested ,to discuss advance directives and complete MOLST
73 yo Female sent from [facility] with respiratory distress. No report of fever or hypoxia. Patient trached/vented ,s/p peg  and non-verbal, unable to contribute to history. Transferred to [place] from [place] ED for admission Admitted for septic workup and evaluation ,send blood and urine cx,serial lactate levels,monitor vitals closely hydration, monitor urine output and renal profile, iv abx initiated -s/p vanco and zosyn . BP stable upon [place] ED arrival Med hx is significant for Advanced dementia ,s/p  Aphasic stroke  ,Constipation  ,COVID-19   ,CVA (cerebral vascular accident)  ,Dementia of frontal lobe type  ,Diabetes mellitus  ,DM (diabetes mellitus)  ,GERD (gastroesophageal reflux disease)  ,HTN (hypertension)  ,Hypertension  ,Pneumonia  ,Quadriplegia  ,Respiratory failure s/p tracheostomy and peg .Found to have RYAN ,hypernatremia and lactate elevated Admit for iv hydration,monitor renal profile and urine output,nutritionist consult,prealbumin level,serial bmp,nutritional supplements Palliative care consult requested ,to discuss advance directives and complete MOLST
75 yo Female sent from [facility] with respiratory distress. No report of fever or hypoxia. Patient trached/vented ,s/p peg  and non-verbal, unable to contribute to history. Transferred to [place] from [place] ED for admission Admitted for septic workup and evaluation ,send blood and urine cx,serial lactate levels,monitor vitals closely hydration,monitor urine output and renal profile, iv abx initiated -s/p vanco and zosyn . BP stable upon [place] ED arrival Med hx is significant for Advanced dementia ,s/p  Aphasic stroke  ,Constipation  ,COVID-19   ,CVA (cerebral vascular accident)  ,Dementia of frontal lobe type  ,Diabetes mellitus  ,DM (diabetes mellitus)  ,GERD (gastroesophageal reflux disease)  ,HTN (hypertension)  ,Hypertension  ,Pneumonia  ,Quadriplegia  ,Respiratory failure s/p tracheostomy and peg .Found to have RYAN ,hypernatremia and lactate elevated Admit for iv hydration,monitor renal profile and urine output,nutritionist consult,prealbumin level,serial bmp,nutritional supplements Palliative care consult requested ,to discuss advance directives and complete MOLST

## 2020-11-06 NOTE — PROGRESS NOTE ADULT - ASSESSMENT
73 yo Female sent from [facility] with respiratory distress. No report of fever or hypoxia. Patient trached/vented ,s/p peg  and non-verbal, unable to contribute to history. Transferred to [place] from [place] ED for admission Admitted for septic workup and evaluation ,send blood and urine cx,serial lactate levels,monitor vitals closely hydration,monitor urine output and renal profile, iv abx initiated -s/p vanco and zosyn . BP stable upon [place] ED arrival Med hx is significant for Advanced dementia ,s/p  Aphasic stroke    Constipation  ,COVID-19   ,CVA (cerebral vascular accident)  ,Dementia of frontal lobe type  ,Diabetes mellitus  ,DM (diabetes mellitus)  ,GERD (gastroesophageal reflux disease)    HTN (hypertension)  ,Hypertension  ,Pneumonia  ,Quadriplegia  ,Respiratory failure s/p tracheostomy and peg .Found to have RYAN ,hypernatremia and lactate elevated Admit for iv hydration,monitor renal profile and urine output,nutritionist consult,prealbumin level,serial bmp,nutritional supplements Palliative care consult requested ,to discuss advance directives and complete MOLST

## 2020-11-06 NOTE — PROGRESS NOTE ADULT - PROBLEM SELECTOR PROBLEM 3
RYAN (acute kidney injury)
Urinary tract infection without hematuria, site unspecified

## 2020-11-06 NOTE — PROGRESS NOTE ADULT - ASSESSMENT
75 yo F PMH frontotemporal dementia, aphasic stroke, chronic trach & peg, bed bound, insulin dependent DM, covid in march, HTN, recent PNA admitted for severe sepsis 2/2 uti.    Neuro: No acute issues. Mentation at baseline. c/w transdermal fentanyl patch  Cardio: hemodynamically stable. TTE shows EF 60-65%, Grade 1 LV diastolic dysfunction, MR, TR. ASA 81 daily through peg tube. Strict Is/Os  Pulm: Continue with mechanical ventilation. CXR clear. Continue albuterol & ipatropium 4 puffs q6 for secretions. 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  GI: Glucerna feeds through peg. 250 cc free water q6 for hypernatremia. c/w fleet enema, famotidine, Miralax sucralfate, and lactobillus  Renal: prerenal RYAN 2/2 sepsis with lactic acidosis - resolved with fluid administration. Hypernatremia - 250 cc free water q6. Replete potassium. Annalise D/C'd  ID: Urosepsis - Ucx grew proteus resistant to nitrofurantoin, continue ceftriaxone 1 g daily for a total of a 14 day course (last day 11/12), will change ceftriaxone to IM at discharge. Blood cultures negative, sputum culture with few pseudomonas likely colonization  Endo: ISS for insulin dependent diabetes, HbA1C 6.6  heme: 40 mg SQ LVX DVT ppx    Dispo: transfer to medicine floor   75 yo F PMH frontotemporal dementia, aphasic stroke, chronic trach & peg, bed bound, insulin dependent DM, covid in march, HTN, recent PNA admitted for severe sepsis 2/2 uti.    Neuro: No acute issues. Mentation at baseline. c/w transdermal fentanyl patch  Cardio: hemodynamically stable. TTE shows EF 60-65%, Grade 1 LV diastolic dysfunction, MR, TR. ASA 81 daily through peg tube. Strict Is/Os  Pulm: Continue with mechanical ventilation. CXR clear. Continue albuterol & ipatropium 4 puffs q6 for secretions. 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  GI: Glucerna feeds through peg. 250 cc free water q6 for hypernatremia. c/w fleet enema, famotidine, Miralax sucralfate, and lactobillus  Renal: prerenal RYAN 2/2 sepsis with lactic acidosis - resolved. Hypernatremia - 250 cc free water q6. Replete potassium. Woody D/C'd. Void trail successful. Patient does not need a chronic Wooyd.   ID: Urosepsis - Ucx grew proteus resistant to nitrofurantoin, continue ceftriaxone 1 g daily for a total of a 14 day course (last day 11/12), will change ceftriaxone to IM at discharge. Blood cultures negative, sputum culture with few pseudomonas likely colonization  Endo: ISS for insulin dependent diabetes, HbA1C 6.6  heme: 40 mg SQ LVX DVT ppx    Dispo: transfer to medicine floor

## 2020-11-06 NOTE — PROGRESS NOTE ADULT - PROBLEM SELECTOR PLAN 4
2/2 TO sepsis with lactic acidosis , continue IV FLUIDS serial bmp ,ins/outs , nutr consult ,nephrology eval
2/2 TO sepsis with lactic acidosis , continue IV FLUIDS serial bmp ,ins/outs , nutritionist  consult ,nephrology eval
will follow
2/2 TO sepsis with lactic acidosis , continue IV FLUIDS serial bmp ,ins/outs , nutr consult ,nephrology eval
2/2 TO sepsis with lactic acidosis , continue IV FLUIDS serial bmp ,ins/outs , nutritionist  consult ,nephrology eval

## 2020-11-06 NOTE — PROGRESS NOTE ADULT - PROBLEM SELECTOR PLAN 9
Gastrointestinal stress ulcer prophylaxis and DVT prophylaxis administered

## 2020-11-06 NOTE — PROGRESS NOTE ADULT - PROBLEM SELECTOR PLAN 2
per ICU
per ICU, remains intubated
per ICU, remains intubated  11/5-appears to have acute decompensation that may
per ICU, remains intubated  11/5-appears to have acute decompensation that may
piperacillin/tazobactam IVPB.. 3.375 Gram(s) IV Intermittent every 8 hours
ventilator management as per icu team and pulm consult ,serial abgs and chest xrays ,head of bed elevated 30 degrees ,nebulized BD

## 2020-11-06 NOTE — PROGRESS NOTE ADULT - PROBLEM SELECTOR PROBLEM 1
Palliative care encounter
RYAN (acute kidney injury)
RYAN (acute kidney injury)
Sepsis, due to unspecified organism, unspecified whether acute organ dysfunction present

## 2020-11-06 NOTE — PROGRESS NOTE ADULT - PROBLEM SELECTOR PLAN 5
corrective regimen sliding scale

## 2020-11-06 NOTE — PROGRESS NOTE ADULT - PROBLEM SELECTOR PROBLEM 8
Advanced dementia

## 2020-11-06 NOTE — PROGRESS NOTE ADULT - SUBJECTIVE AND OBJECTIVE BOX
BREA BECKHAM    \[facility] [place] [facility]\"" ICU1 18    Patient is a 74y old  Female who presents with a chief complaint of sob (05 Nov 2020 20:49)       Allergies    codeine (Hives)    Intolerances        HPI:  75 yo Female sent from [facility] with respiratory distress. No report of fever or hypoxia. Patient trached/vented ,s/p peg  and non-verbal, unable to contribute to history. Transferred to [facility] from [place] ED for admission Admitted for septic workup and evaluation ,send blood and urine cx,serial lactate levels,monitor vitals closely hydration,monitor urine output and renal profile, iv abx initiated -s/p vanco and zosyn . BP stable upon [facility] ED arrival Med hx is significant for Advanced dementia ,s/p  Aphasic stroke    Constipation  ,COVID-19   ,CVA (cerebral vascular accident)  ,Dementia of frontal lobe type  ,Diabetes mellitus  ,DM (diabetes mellitus)  ,GERD (gastroesophageal reflux disease)    HTN (hypertension)  ,Hypertension  ,Pneumonia  ,Quadriplegia  ,Respiratory failure ,s/p tracheostomy and peg .Found to have RYAN ,hypernatremia and lactate elevated Admit for iv hydration,monitor renal profile and urine output,nutritionist consult,prealbumin level,serial bmp,nutritional supplements, Palliative care consult requested ,to discuss advance directives and complete MOLST  (30 Oct 2020 07:14)      PAST MEDICAL & SURGICAL HISTORY:  Pneumonia    Quadriplegia    COVID-19 virus detected    Advanced dementia    DM (diabetes mellitus)    HTN (hypertension)    CVA (cerebral vascular accident)    Respiratory failure    Constipation    GERD (gastroesophageal reflux disease)    Hypertension    Respiratory failure    Diabetes mellitus    Aphasic stroke    Dementia of frontal lobe type    Feeding by G-tube    Tracheostomy tube present    Tracheostomy in place    Gastrostomy in place    Hx of appendectomy        FAMILY HISTORY:  No pertinent family history in first degree relatives          MEDICATIONS   ALBUTerol    90 MICROgram(s) HFA Inhaler 4 Puff(s) Inhalation every 6 hours  aspirin  chewable 81 milliGRAM(s) Oral daily  atropine 1% Ophthalmic Solution for SubLingual Use 1 Drop(s) SubLingual three times a day PRN  calcium carbonate 1250 mG  + Vitamin D (OsCal 500 + D) 1 Tablet(s) Oral two times a day  cefTRIAXone   IVPB 1000 milliGRAM(s) IV Intermittent every 24 hours  chlorhexidine 2% Cloths 1 Application(s) Topical daily  dextrose 40% Gel 15 Gram(s) Oral once PRN  dextrose 5%. 1000 milliLiter(s) IV Continuous <Continuous>  enoxaparin Injectable 40 milliGRAM(s) SubCutaneous daily  famotidine    Tablet 20 milliGRAM(s) Oral daily  glucagon  Injectable 1 milliGRAM(s) IntraMuscular once PRN  insulin lispro (ADMELOG) corrective regimen sliding scale   SubCutaneous every 6 hours  ipratropium 17 MICROgram(s) HFA Inhaler 4 Puff(s) Inhalation every 6 hours  lactobacillus acidophilus 1 Tablet(s) Oral two times a day  saline laxative (FLEET) Rectal Enema 1 Enema Rectal at bedtime  sucralfate 1 Gram(s) Oral two times a day      Vital Signs Last 24 Hrs  T(C): 36.7 (06 Nov 2020 05:00), Max: 37.1 (05 Nov 2020 12:00)  T(F): 98 (06 Nov 2020 05:00), Max: 98.8 (05 Nov 2020 12:00)  HR: 56 (06 Nov 2020 05:00) (56 - 127)  BP: 124/58 (06 Nov 2020 05:00) (114/66 - 178/74)  BP(mean): 83 (06 Nov 2020 05:00) (83 - 119)  RR: 14 (06 Nov 2020 05:00) (0 - 34)  SpO2: 100% (06 Nov 2020 05:00) (100% - 100%)      11-04-20 @ 07:01  -  11-05-20 @ 07:00  --------------------------------------------------------  IN: 2270 mL / OUT: 950 mL / NET: 1320 mL    11-05-20 @ 07:01  -  11-06-20 @ 06:39  --------------------------------------------------------  IN: 2250 mL / OUT: 1400 mL / NET: 850 mL        Mode: AC/ CMV (Assist Control/ Continuous Mandatory Ventilation), RR (machine): 14, TV (machine): 400, FiO2: 30, PEEP: 5, ITime: 1, MAP: 9.4, PIP: 21    LABS:                        10.0   7.86  )-----------( 197      ( 05 Nov 2020 05:23 )             31.7     11-05    146<H>  |  112<H>  |  20  ----------------------------<  127<H>  3.2<L>   |  27  |  1.00    Ca    8.7      05 Nov 2020 05:23  Phos  3.8     11-05  Mg     2.2     11-05    TPro  6.3  /  Alb  2.5<L>  /  TBili  0.3  /  DBili  x   /  AST  18  /  ALT  27  /  AlkPhos  68  11-05              WBC:  WBC Count: 7.86 K/uL (11-05 @ 05:23)  WBC Count: 7.33 K/uL (11-04 @ 08:45)  WBC Count: 7.22 K/uL (11-03 @ 05:43)      MICROBIOLOGY:  RECENT CULTURES:  10-31 .Sputum Sputum Pseudomonas aeruginosa   Few Squamous epithelial cells per low power field  Few polymorphonuclear leukocytes per low power field  Rare Yeast like cells per oil power field  Rare Gram Negative Rods per oil power field   Mixed gram negative rods Culture includes  Few Pseudomonas aeruginosa  Normal Respiratory Elvira present    10-30 .Blood Blood-Peripheral Proteus mirabilis XXXX   No Growth Final                    Sodium:  Sodium, Serum: 146 mmol/L (11-05 @ 05:23)  Sodium, Serum: 146 mmol/L (11-04 @ 08:45)  Sodium, Serum: 147 mmol/L (11-03 @ 05:43)      1.00 mg/dL 11-05 @ 05:23  0.91 mg/dL 11-04 @ 08:45  1.00 mg/dL 11-03 @ 05:43      Hemoglobin:  Hemoglobin: 10.0 g/dL (11-05 @ 05:23)  Hemoglobin: 10.5 g/dL (11-04 @ 08:45)  Hemoglobin: 11.4 g/dL (11-03 @ 05:43)      Platelets: Platelet Count - Automated: 197 K/uL (11-05 @ 05:23)  Platelet Count - Automated: 207 K/uL (11-04 @ 08:45)  Platelet Count - Automated: 212 K/uL (11-03 @ 05:43)      LIVER FUNCTIONS - ( 05 Nov 2020 05:23 )  Alb: 2.5 g/dL / Pro: 6.3 g/dL / ALK PHOS: 68 U/L / ALT: 27 U/L / AST: 18 U/L / GGT: x                 RADIOLOGY & ADDITIONAL STUDIES:

## 2020-11-06 NOTE — PROGRESS NOTE ADULT - PROBLEM SELECTOR PROBLEM 2
Respiratory failure
Sepsis, due to unspecified organism, unspecified whether acute organ dysfunction present
Respiratory failure
Respiratory failure

## 2020-11-06 NOTE — PROGRESS NOTE ADULT - PROBLEM SELECTOR PLAN 1
pt is a 74 female with eval for sepsis - chr resp failure -    = HCP   pt is full code  on emp ABX for acute infection - hx of Pseudomonas -   covid ab positive  supportive measure and ICU care  will follow  discussed with .   Sputum cx- gram neg rods  urine cx - proteus   blood cx neg x 2
pt is a 74 female with eval for sepsis - chr resp failure -    = HCP   pt is full code  on emp ABX for acute infection - hx of Pseudomonas -   covid ab positive  supportive measure and ICU care  will follow  discussed with .   Sputum cx- gram neg rods  urine cx - proteus   blood cx neg x 2.
pt is a 74 female with eval for sepsis - chr resp failure -    = HCP   pt is full code  on emp ABX for acute infection - hx of Pseudomonas -   cx pending  covid ab positive  work up in progress   supportive measure and ICU care  will follow  discussed with .
pt is a 74 female with eval for sepsis - chr resp failure -    = HCP   pt is full code  on emp ABX for acute infection - hx of Pseudomonas - on Rocephin IV Now  covid ab positive  supportive measure and ICU care  will follow  discussed with .   Sputum cx- gram neg rods  urine cx - proteus   blood cx neg x 2.
pt is a 74 female with eval for sepsis - chr resp failure -    = HCP   pt is full code  on emp ABX for acute infection - hx of Pseudomonas - on Rocephin IV Now  covid ab positive  supportive measure and ICU care  will follow  discussed with .   Sputum cx- gram neg rods  urine cx - proteus   blood cx neg x 2.   poss dc to H today
Admitted for septic workup and evaluation,send blood and urine cx,serial lactate levels,monitor vitals closley,ivfs hydration,monitor urine output and renal profile,iv abx initiated -ON ZOSYN  ,id cons requested
CONTINUE CEFTRIAXONE WITH PLAN FOR 14 DAYS OF THERAPY .RULED IN FOR SEPSIS LIKELY SECONDARY TO UTI ,POA .last day of abx 11/12 ,will discharge back to [facility] on iv abx if remains hemodynamically stable
DEESCALATED TO CEFTRIAXONE WITH PLAN FOR 14 DAYS OF THERAPY .RULED IN FOR SEPSIS LIKELY SECONDARY TO UTI ,POA
DEESCALATED TO CEFTRIAXONE WITH PLAN FOR 14 DAYS OF THERAPY .RULED IN FOR SEPSIS LIKELY SECONDARY TO UTI ,POA .last day of abx 11/12 ,will discharge back to [facility] on iv abx if remains hemodynamically stable
DEESCALATED TO CEFTRIAXONE WITH PLAN FOR 14 DAYS OF THERAPY .RULED IN FOR SEPSIS LIKELY SECONDARY TO UTI ,POA .last day of abx 11/12 ,will discharge back to [facility] on iv abx if remains hemodynamically stable
continue ceftriaxone with plan for 14 days of therapy so last day 11/12  can be IM if necessary
continue ceftriaxone with plan for 14 days of therapy so last day 11/12 unclear if we will be able to de-escalate to oral
manasa   Serum creatinine is improved .    Fluid status stable.   Will continue to avoid nephrotoxic drugs.  Patient remains asymptomatic.  Continue current therapy.  hypernatremia is improving
manasa , Serum creatinine is improved .   will sign off   f/u prn as needed
source is not entirely clear and noted rapid drop in wbc and no clear pulmonary localization.  Urinary source as potential localization. Agree with broad spectrum abx pending clarification of clinical picture. With proteus in urine and GNRs in sputum would continue Zosyn
source is not entirely clear but working diagnosis with Urinary source as potential localization and pseudomonas in lungs suggest colonization.  With proteus import issues for abx is subspecies issues relative to inducible beta-lactamase (cAMP) mirabilis (indole negative) causes 90% of infections.  Other Proteus spp. are indole positive, e.g., P. vulgaris and P. penneri and here induction is an issue. With this being P mirabilis can de-escalate to ceftriasone with plan for 14 days of therapy so last day 11/12
source is not entirely clear but working diagnosis with Urinary source as potential localization and pseudomonas in lungs suggest colonization.  With proteus important issues for abx is subspecies issues relative to inducible beta-lactamase (cAMP) mirabilis (indole negative) causes 90% of infections.  Other Proteus spp. are indole positive, e.g., P. vulgaris and P. penneri and here induction is an issue. With this being P mirabilis can treat w ceftriaxone with plan for 14 days of therapy so last day 11/12 unclear if we will be able to de-escalate to oral
source is not entirely clear but working diagnosis with Urinary source as potential localization and pseudomonas in lungs suggest colonization.  With proteus important issues for abx is subspecies issues relative to inducible beta-lactamase (cAMP) mirabilis (indole negative) causes 90% of infections.  Other Proteus spp. are indole positive, e.g., P. vulgaris and P. penneri and here induction is an issue. With this being P mirabilis can treat w ceftriaxone with plan for 14 days of therapy so last day 11/12 unclear if we will be able to de-escalate to oral
Admitted for septic workup and evaluation,send blood and urine cx,serial lactate levels,monitor vitals closley,ivfs hydration,monitor urine output and renal profile,iv abx initiated -ON ZOSYN  ,id cons requested
DEESCALATED TO CEFTRIAXONE WITH PLAN FOR 14 DAYS OF THERAPY .RULED IN FOR SEPSIS LIKELY SECONDARY TO UTI ,POA

## 2020-11-06 NOTE — PROGRESS NOTE ADULT - PROBLEM SELECTOR PROBLEM 4
Quadriplegia
RYAN (acute kidney injury)

## 2020-11-06 NOTE — PROGRESS NOTE ADULT - SUBJECTIVE AND OBJECTIVE BOX
[facility] DIVISION of INFECTIOUS DISEASE  Arturo Olivas MD PhD, Haylee Umanzor MD, Andressa Brantley MD, Billy Valenzuela MD  and providing coverage with Alexa Canas MD and Eusebio Chairze MD  Providing Infectious Disease Consultations at [facility], [facility], [facility]'s    Office# [phone] to schedule follow up appointments  Answering Service for urgent calls or New Consults [phone]  Cell# to text for urgent issues Arturo Olivas [phone]     infectious diseases progress note:    BREA BECKHAM is a 74y y. o. Female patient    Patient remains nonverbal on vent    Allergies    codeine (Hives)    Intolerances        ANTIBIOTICS/RELEVANT:  antimicrobials  cefTRIAXone   IVPB 1000 milliGRAM(s) IV Intermittent every 24 hours    immunologic:    OTHER:  ALBUTerol    90 MICROgram(s) HFA Inhaler 4 Puff(s) Inhalation every 6 hours  aspirin  chewable 81 milliGRAM(s) Oral daily  atropine 1% Ophthalmic Solution for SubLingual Use 1 Drop(s) SubLingual three times a day PRN  calcium carbonate 1250 mG  + Vitamin D (OsCal 500 + D) 1 Tablet(s) Oral two times a day  chlorhexidine 2% Cloths 1 Application(s) Topical daily  dextrose 40% Gel 15 Gram(s) Oral once PRN  dextrose 5%. 1000 milliLiter(s) IV Continuous <Continuous>  enoxaparin Injectable 40 milliGRAM(s) SubCutaneous daily  famotidine    Tablet 20 milliGRAM(s) Oral daily  glucagon  Injectable 1 milliGRAM(s) IntraMuscular once PRN  insulin lispro (ADMELOG) corrective regimen sliding scale   SubCutaneous every 6 hours  ipratropium 17 MICROgram(s) HFA Inhaler 4 Puff(s) Inhalation every 6 hours  lactobacillus acidophilus 1 Tablet(s) Oral two times a day  potassium chloride   Powder 40 milliEquivalent(s) Enteral Tube every 4 hours  saline laxative (FLEET) Rectal Enema 1 Enema Rectal at bedtime  sucralfate 1 Gram(s) Oral two times a day      Objective:  Last 24-Vital Signs Last 24 Hrs  T(C): 36.8 (06 Nov 2020 07:52), Max: 37.1 (05 Nov 2020 12:00)  T(F): 98.3 (06 Nov 2020 07:52), Max: 98.8 (05 Nov 2020 12:00)  HR: 60 (06 Nov 2020 11:36) (55 - 112)  BP: 156/66 (06 Nov 2020 10:00) (114/66 - 178/74)  BP(mean): 95 (06 Nov 2020 10:00) (83 - 119)  RR: 14 (06 Nov 2020 10:00) (0 - 24)  SpO2: 100% (06 Nov 2020 11:36) (100% - 100%)    T(C): 36.8 (11-06-20 @ 07:52), Max: 37.3 (11-05-20 @ 05:32)  T(F): 98.3 (11-06-20 @ 07:52), Max: 99.2 (11-05-20 @ 05:32)  T(C): 36.8 (11-06-20 @ 07:52), Max: 37.3 (11-05-20 @ 05:32)  T(F): 98.3 (11-06-20 @ 07:52), Max: 99.2 (11-05-20 @ 05:32)  T(C): 36.8 (11-06-20 @ 07:52), Max: 37.3 (11-05-20 @ 05:32)  T(F): 98.3 (11-06-20 @ 07:52), Max: 99.2 (11-05-20 @ 05:32)    PHYSICAL EXAM: intubated via trach  Constitutional: Well-developed, well nourished  Eyes: PERRLA, EOMI  Ear/Nose/Throat: oropharynx normal	  Neck: no JVD, no lymphadenopathy, supple  Respiratory: no accessory muscle use, lung fields bilaterally upper airway sounds  Cardiovascular: distant  Gastrointestinal: soft, NT, no HSM, BS-normal  Extremities: no clubbing, no cyanosis, edema absent  Neuro: patient not alert      LABS:                        10.0   7.86  )-----------( 197      ( 05 Nov 2020 05:23 )             31.7       WBC 7.86  11-05 @ 05:23  WBC 7.33  11-04 @ 08:45  WBC 7.22  11-03 @ 05:43  WBC 7.82  11-02 @ 05:42  WBC 11.74  11-01 @ 05:46  WBC 6.31  10-31 @ 05:07      11-05    146<H>  |  112<H>  |  20  ----------------------------<  127<H>  3.2<L>   |  27  |  1.00    Ca    8.7      05 Nov 2020 05:23  Phos  3.8     11-05  Mg     2.2     11-05    TPro  6.3  /  Alb  2.5<L>  /  TBili  0.3  /  DBili  x   /  AST  18  /  ALT  27  /  AlkPhos  68  11-05      Creatinine, Serum: 1.00 mg/dL (11-05-20 @ 05:23)  Creatinine, Serum: 0.91 mg/dL (11-04-20 @ 08:45)  Creatinine, Serum: 1.00 mg/dL (11-03-20 @ 05:43)  Creatinine, Serum: 0.95 mg/dL (11-02-20 @ 05:42)  Creatinine, Serum: 1.00 mg/dL (11-01-20 @ 05:46)  Creatinine, Serum: 1.00 mg/dL (10-31-20 @ 05:07)                MICROBIOLOGY:              RADIOLOGY & ADDITIONAL STUDIES:

## 2020-11-06 NOTE — PROGRESS NOTE ADULT - SUBJECTIVE AND OBJECTIVE BOX
Date/Time Patient Seen:  		  Referring MD:   Data Reviewed	       Patient is a 74y old  Female who presents with a chief complaint of sob (05 Nov 2020 20:49)      Subjective/HPI     PAST MEDICAL & SURGICAL HISTORY:  Pneumonia    Quadriplegia    COVID-19 virus detected    Advanced dementia    DM (diabetes mellitus)    HTN (hypertension)    CVA (cerebral vascular accident)    Respiratory failure    Constipation    GERD (gastroesophageal reflux disease)    Hypertension    Respiratory failure    Diabetes mellitus    Aphasic stroke    Dementia of frontal lobe type    Feeding by G-tube    Tracheostomy tube present    Tracheostomy in place    Gastrostomy in place    Hx of appendectomy          Medication list         MEDICATIONS  (STANDING):  ALBUTerol    90 MICROgram(s) HFA Inhaler 4 Puff(s) Inhalation every 6 hours  aspirin  chewable 81 milliGRAM(s) Oral daily  calcium carbonate 1250 mG  + Vitamin D (OsCal 500 + D) 1 Tablet(s) Oral two times a day  cefTRIAXone   IVPB 1000 milliGRAM(s) IV Intermittent every 24 hours  chlorhexidine 2% Cloths 1 Application(s) Topical daily  dextrose 5%. 1000 milliLiter(s) (50 mL/Hr) IV Continuous <Continuous>  enoxaparin Injectable 40 milliGRAM(s) SubCutaneous daily  famotidine    Tablet 20 milliGRAM(s) Oral daily  insulin lispro (ADMELOG) corrective regimen sliding scale   SubCutaneous every 6 hours  ipratropium 17 MICROgram(s) HFA Inhaler 4 Puff(s) Inhalation every 6 hours  lactobacillus acidophilus 1 Tablet(s) Oral two times a day  saline laxative (FLEET) Rectal Enema 1 Enema Rectal at bedtime  sucralfate 1 Gram(s) Oral two times a day    MEDICATIONS  (PRN):  atropine 1% Ophthalmic Solution for SubLingual Use 1 Drop(s) SubLingual three times a day PRN salicary secretion  dextrose 40% Gel 15 Gram(s) Oral once PRN Blood Glucose LESS THAN 70 milliGRAM(s)/deciliter  glucagon  Injectable 1 milliGRAM(s) IntraMuscular once PRN Glucose LESS THAN 70 milligrams/deciliter         Vitals log        ICU Vital Signs Last 24 Hrs  T(C): 36.7 (06 Nov 2020 05:00), Max: 37.1 (05 Nov 2020 12:00)  T(F): 98 (06 Nov 2020 05:00), Max: 98.8 (05 Nov 2020 12:00)  HR: 57 (06 Nov 2020 06:00) (56 - 127)  BP: 152/67 (06 Nov 2020 06:00) (114/66 - 178/74)  BP(mean): 96 (06 Nov 2020 06:00) (83 - 119)  ABP: --  ABP(mean): --  RR: 15 (06 Nov 2020 06:00) (0 - 34)  SpO2: 100% (06 Nov 2020 06:00) (100% - 100%)       Mode: AC/ CMV (Assist Control/ Continuous Mandatory Ventilation)  RR (machine): 14  TV (machine): 400  FiO2: 30  PEEP: 5  ITime: 1  MAP: 9.4  PIP: 21      Input and Output:  I&O's Detail    04 Nov 2020 07:01  -  05 Nov 2020 07:00  --------------------------------------------------------  IN:    Enteral Tube Flush: 210 mL    Free Water: 1010 mL    Glucerna: 1000 mL    IV PiggyBack: 50 mL  Total IN: 2270 mL    OUT:    Incontinent per Collection Bag (mL): 500 mL    Indwelling Catheter - Urethral (mL): 450 mL  Total OUT: 950 mL    Total NET: 1320 mL      05 Nov 2020 07:01  -  06 Nov 2020 06:59  --------------------------------------------------------  IN:    Enteral Tube Flush: 250 mL    Free Water: 1000 mL    Glucerna: 1000 mL    IV PiggyBack: 50 mL  Total IN: 2300 mL    OUT:    Incontinent per Collection Bag (mL): 1400 mL  Total OUT: 1400 mL    Total NET: 900 mL          Lab Data                        10.0   7.86  )-----------( 197      ( 05 Nov 2020 05:23 )             31.7     11-05    146<H>  |  112<H>  |  20  ----------------------------<  127<H>  3.2<L>   |  27  |  1.00    Ca    8.7      05 Nov 2020 05:23  Phos  3.8     11-05  Mg     2.2     11-05    TPro  6.3  /  Alb  2.5<L>  /  TBili  0.3  /  DBili  x   /  AST  18  /  ALT  27  /  AlkPhos  68  11-05            Review of Systems	      Objective     Physical Examination    heart s1s2  lung dec BS  abd soft      Pertinent Lab findings & Imaging      Annalise:  NO   Adequate UO     I&O's Detail    04 Nov 2020 07:01  -  05 Nov 2020 07:00  --------------------------------------------------------  IN:    Enteral Tube Flush: 210 mL    Free Water: 1010 mL    Glucerna: 1000 mL    IV PiggyBack: 50 mL  Total IN: 2270 mL    OUT:    Incontinent per Collection Bag (mL): 500 mL    Indwelling Catheter - Urethral (mL): 450 mL  Total OUT: 950 mL    Total NET: 1320 mL      05 Nov 2020 07:01  -  06 Nov 2020 06:59  --------------------------------------------------------  IN:    Enteral Tube Flush: 250 mL    Free Water: 1000 mL    Glucerna: 1000 mL    IV PiggyBack: 50 mL  Total IN: 2300 mL    OUT:    Incontinent per Collection Bag (mL): 1400 mL  Total OUT: 1400 mL    Total NET: 900 mL               Discussed with:     Cultures:	        Radiology

## 2020-11-06 NOTE — DISCHARGE NOTE NURSING/CASE MANAGEMENT/SOCIAL WORK - PATIENT PORTAL LINK FT
You can access the FollowMyHealth Patient Portal offered by [facility] by registering at the following website: [email or web address]. By joining [facility]’s FollowMyHealth portal, you will also be able to view your health information using other applications (apps) compatible with our system.

## 2020-11-06 NOTE — PROGRESS NOTE ADULT - NSHPATTENDINGPLANDISCUSS_GEN_ALL_CORE
Consultants , RN and PMD
MED STAFF , , , ,
MED STAFF , , , , .DISCHARGE TO LTC FACILITY
MED STAFF , , , ,
MED STAFF in ICU  , , , ,

## 2020-11-06 NOTE — PROGRESS NOTE ADULT - ASSESSMENT
cont rx   cont rx      CHAPINCITO GUIDRY  DOA 10/30/2020 DR ILIANA KEMP       REVIEW OF SYMPTOMS      Able to give ROS  NO     PHYSICAL EXAM    HEENT Unremarkable PERRLA atraumatic   RESP Fair air entry EXP prolonged    Harsh breath sound Resp distres mild   CARDIAC S1 S2 No S3     NO JVD    ABDOMEN SOFT BS PRESENT NOT DISTENDED No hepatosplenomegaly PEDAL EDEMA present No calf tenderness  NO rash     PT DATA/BEST PRACTICE  ALLERGY        codeine       WT                             10/30/2020 56   BMI                                  10/30/2020 22      ADVANCED DIRECTIVE     HEAD OF BED ELEVATION Yes      DVT PROPHYLAXIS.   LVNX 30 (10/30) --> lvnx 40 (10/31)   DIET. glucerna 1.5 1000 (10/3)   FREE WATER 250.4 (11/1) (Dr NICHOLAS)                                                                 SQUIRES PROPHYLAXIS. FAMOTIDINE 20 (10/30)  INFECTION PPLX          chlorhexidine 4% (10/30)                                           OXYGENATION.         VITALS.   11/6/2020 afeb 60 150/60   11/5/2020 afeb 75 170/70   11/5/2020 ac 14/400/5/.3       PATIENT SUMMARY.   74 yr female hx of dm, quadaplegic,  resp failure on chronic vent , htn, peg, CVA, advanced dementia, recent admission for +++COVID , d/c to vent facility with neg covid, now with resp distress, sepsis, hypotensive  , most likely UTI  sepsis   Pulm consulted 10/30      PROBLEM/ASSESSMENT/RECOMMENDATIONS.  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

## 2020-11-06 NOTE — PROGRESS NOTE ADULT - PROBLEM SELECTOR PLAN 8
supportive care Palliative care consult requested ,to discuss advance directives and complete MOLST

## 2020-11-06 NOTE — PROGRESS NOTE ADULT - SUBJECTIVE AND OBJECTIVE BOX
PROGRESS NOTE  Patient is a 74y old  Female who presents with a chief complaint of sob (05 Nov 2020 20:49)  Chart and available morning labs /imaging are reviewed electronically , urgent issues addressed . More information  is being added upon completion of rounds , when more information is collected and management discussed with consultants , medical staff and social service/case management on the floor   OVERNIGHT  No new issues reported by medical staff . All above noted Cleared by ID for d/c back on IM CEFTRIAXONE ,last day 11/12/20  HPI:  73 yo Female sent from [facility] with respiratory distress. No report of fever or hypoxia. Patient trached/vented ,s/p peg  and non-verbal, unable to contribute to history. Transferred to [facility] from [facility] for admission Admitted for septic workup and evaluation ,send blood and urine cx,serial lactate levels,monitor vitals closely hydration,monitor urine output and renal profile, iv abx initiated -s/p vanco and zosyn . BP stable upon [facility] ED arrival Med hx is significant for Advanced dementia ,s/p  Aphasic stroke    Constipation  ,COVID-19   ,CVA (cerebral vascular accident)  ,Dementia of frontal lobe type  ,Diabetes mellitus  ,DM (diabetes mellitus)  ,GERD (gastroesophageal reflux disease)    HTN (hypertension)  ,Hypertension  ,Pneumonia  ,Quadriplegia  ,Respiratory failure ,s/p tracheostomy and peg .Found to have RYAN ,hypernatremia and lactate elevated Admit for iv hydration,monitor renal profile and urine output,nutritionist consult,prealbumin level,serial bmp,nutritional supplements, Palliative care consult requested ,to discuss advance directives and complete MOLST  (30 Oct 2020 07:14)    PAST MEDICAL & SURGICAL HISTORY:  Pneumonia    Quadriplegia    COVID-19 virus detected    Advanced dementia    DM (diabetes mellitus)    HTN (hypertension)    CVA (cerebral vascular accident)    Respiratory failure    Constipation    GERD (gastroesophageal reflux disease)    Hypertension    Respiratory failure    Diabetes mellitus    Aphasic stroke    Dementia of frontal lobe type    Feeding by G-tube    Tracheostomy tube present    Tracheostomy in place    Gastrostomy in place    Hx of appendectomy        MEDICATIONS  (STANDING):  ALBUTerol    90 MICROgram(s) HFA Inhaler 4 Puff(s) Inhalation every 6 hours  aspirin  chewable 81 milliGRAM(s) Oral daily  calcium carbonate 1250 mG  + Vitamin D (OsCal 500 + D) 1 Tablet(s) Oral two times a day  cefTRIAXone   IVPB 1000 milliGRAM(s) IV Intermittent every 24 hours  chlorhexidine 2% Cloths 1 Application(s) Topical daily  dextrose 5%. 1000 milliLiter(s) (50 mL/Hr) IV Continuous <Continuous>  enoxaparin Injectable 40 milliGRAM(s) SubCutaneous daily  famotidine    Tablet 20 milliGRAM(s) Oral daily  insulin lispro (ADMELOG) corrective regimen sliding scale   SubCutaneous every 6 hours  ipratropium 17 MICROgram(s) HFA Inhaler 4 Puff(s) Inhalation every 6 hours  lactobacillus acidophilus 1 Tablet(s) Oral two times a day  potassium chloride   Powder 40 milliEquivalent(s) Enteral Tube every 4 hours  saline laxative (FLEET) Rectal Enema 1 Enema Rectal at bedtime  sucralfate 1 Gram(s) Oral two times a day    MEDICATIONS  (PRN):  atropine 1% Ophthalmic Solution for SubLingual Use 1 Drop(s) SubLingual three times a day PRN salicary secretion  dextrose 40% Gel 15 Gram(s) Oral once PRN Blood Glucose LESS THAN 70 milliGRAM(s)/deciliter  glucagon  Injectable 1 milliGRAM(s) IntraMuscular once PRN Glucose LESS THAN 70 milligrams/deciliter      OBJECTIVE    T(C): 36.8 (11-06-20 @ 07:52), Max: 37.1 (11-05-20 @ 12:00)  HR: 60 (11-06-20 @ 10:00) (55 - 112)  BP: 156/66 (11-06-20 @ 10:00) (114/66 - 178/74)  RR: 14 (11-06-20 @ 10:00) (0 - 24)  SpO2: 100% (11-06-20 @ 10:00) (100% - 100%)  Wt(kg): --  I&O's Summary    05 Nov 2020 07:01  -  06 Nov 2020 07:00  --------------------------------------------------------  IN: 2300 mL / OUT: 1400 mL / NET: 900 mL    06 Nov 2020 07:01  -  06 Nov 2020 11:23  --------------------------------------------------------  IN: 200 mL / OUT: 0 mL / NET: 200 mL          REVIEW OF SYSTEMS:  unobtainable due to mental state     PHYSICAL EXAM:  Appearance: NAD. VS past 24 hrs -as above   HEENT:   Moist oral mucosa. Conjunctiva clear b/l.   Neck : supple trach  Respiratory: Lungs CTAB.  Gastrointestinal:  Soft, nontender. No rebound. No rigidity. BS present	peg  Cardiovascular: RRR ,S1S2 present  Neurologic: Non-focal. Moving all extremities.  Extremities: No edema. No erythema. No calf tenderness.  Skin: No rashes, No ecchymoses, No cyanosis.	  wounds ,skin lesions-See skin assesment flow sheet   LABS:                        10.0   7.86  )-----------( 197      ( 05 Nov 2020 05:23 )             31.7     11-05    146<H>  |  112<H>  |  20  ----------------------------<  127<H>  3.2<L>   |  27  |  1.00    Ca    8.7      05 Nov 2020 05:23  Phos  3.8     11-05  Mg     2.2     11-05    TPro  6.3  /  Alb  2.5<L>  /  TBili  0.3  /  DBili  x   /  AST  18  /  ALT  27  /  AlkPhos  68  11-05    CAPILLARY BLOOD GLUCOSE      POCT Blood Glucose.: 107 mg/dL (06 Nov 2020 05:23)  POCT Blood Glucose.: 178 mg/dL (06 Nov 2020 00:29)  POCT Blood Glucose.: 149 mg/dL (05 Nov 2020 17:26)  POCT Blood Glucose.: 208 mg/dL (05 Nov 2020 11:59)          Culture - Sputum (collected 31 Oct 2020 01:25)  Source: .Sputum Sputum  Gram Stain (31 Oct 2020 05:02):    Few Squamous epithelial cells per low power field    Few polymorphonuclear leukocytes per low power field    Rare Yeast like cells per oil power field    Rare Gram Negative Rods per oil power field  Final Report (02 Nov 2020 17:02):    Mixed gram negative rods Culture includes    Few Pseudomonas aeruginosa    Normal Respiratory Elvira present  Organism: Pseudomonas aeruginosa (02 Nov 2020 17:02)  Organism: Pseudomonas aeruginosa (02 Nov 2020 17:02)    Culture - Urine (collected 30 Oct 2020 13:18)  Source: .Urine Clean Catch (Midstream)  Final Report (01 Nov 2020 19:08):    >100,000 CFU/ml Proteus mirabilis  Organism: Proteus mirabilis (01 Nov 2020 19:08)  Organism: Proteus mirabilis (01 Nov 2020 19:08)    Culture - Blood (collected 30 Oct 2020 13:18)  Source: .Blood Blood-Peripheral  Final Report (04 Nov 2020 13:00):    No Growth Final    Culture - Blood (collected 30 Oct 2020 13:18)  Source: .Blood Blood-Peripheral  Final Report (04 Nov 2020 13:00):    No Growth Final  RADIOLOGY & ADDITIONAL TESTS:   reviewed elctronically  ASSESSMENT/PLAN: 	  Patient was seen and examined on a day of discharge . Plan of care , discharge medications and recommendations discussed with consultants and clearance for discharge obtained .Social service , case management  and medical staff are aware of plan. Family is notified. Discharge summary  is  prepared electronically 45minutes spent on this visit, 50% visit time spent in care co-ordination with other attendings and counselling patient  I have discussed care plan with patient and HCP ,expressed understanding of problems treatment and their effect and side effects, alternatives in detail,I have asked if they have any questions and concerns and appropriately addressed them to best of my ability

## 2020-11-06 NOTE — PROGRESS NOTE ADULT - SUBJECTIVE AND OBJECTIVE BOX
Patient is a 74y old  Female who presents with a chief complaint of sob (05 Nov 2020 20:49)    24 hour events: No acute events overnight.    REVIEW OF SYSTEMS  Unable to assess 2/2 mental status    T(F): 98 (11-06-20 @ 05:00), Max: 98.8 (11-05-20 @ 12:00)  HR: 57 (11-06-20 @ 06:00) (56 - 127)  BP: 152/67 (11-06-20 @ 06:00) (114/66 - 178/74)  RR: 15 (11-06-20 @ 06:00) (0 - 34)  SpO2: 100% (11-06-20 @ 06:00) (100% - 100%)      Mode: AC/ CMV (Assist Control/ Continuous Mandatory Ventilation), RR (machine): 14, TV (machine): 400, FiO2: 30, PEEP: 5        I&O's Summary    11-05 @ 07:01  -  11-06 @ 07:00  --------------------------------------------------------  IN: 2300 mL / OUT: 1400 mL / NET: 900 mL      PHYSICAL EXAM  General: frail appearing woman, awake but unresponsive, intubated, trached  CNS: contracted bedbound, does not follow commands, withdraws from painful stimuli, opens eyes to name  HEENT: PERRL  Resp: CTABL, trache sight clean, no discharge  CVS: rrr no m/r/g  Abd: soft, nontender, mildly distended. PEG site clean, no erythema, discharge  Ext: No cyanosis, edema  Skin: warm & perfused    MEDICATIONS  cefTRIAXone   IVPB IV Intermittent      dextrose 40% Gel Oral PRN  glucagon  Injectable IntraMuscular PRN  insulin lispro (ADMELOG) corrective regimen sliding scale SubCutaneous    ALBUTerol    90 MICROgram(s) HFA Inhaler Inhalation  ipratropium 17 MICROgram(s) HFA Inhaler Inhalation        aspirin  chewable Oral  enoxaparin Injectable SubCutaneous    famotidine    Tablet Oral  saline laxative (FLEET) Rectal Enema Rectal  sucralfate Oral      calcium carbonate 1250 mG  + Vitamin D (OsCal 500 + D) Oral  dextrose 5%. IV Continuous      atropine 1% Ophthalmic Solution for SubLingual Use SubLingual PRN  chlorhexidine 2% Cloths Topical    lactobacillus acidophilus Oral                          10.0   7.86  )-----------( 197      ( 05 Nov 2020 05:23 )             31.7       11-05    146<H>  |  112<H>  |  20  ----------------------------<  127<H>  3.2<L>   |  27  |  1.00    Ca    8.7      05 Nov 2020 05:23  Phos  3.8     11-05  Mg     2.2     11-05    TPro  6.3  /  Alb  2.5<L>  /  TBili  0.3  /  DBili  x   /  AST  18  /  ALT  27  /  AlkPhos  68  11-05                    Radiology: no new imaging  Bedside lung ultrasound: N/A  Bedside ECHO: N/A    CENTRAL LINE: N  REID: N  A-LINE: N                 GLOBAL ISSUE/BEST PRACTICE  Analgesia: Y  Sedation: N  HOB elevation: yes  Stress ulcer prophylaxis: Y  VTE prophylaxis: Y  Glycemic control: Y  Nutrition: glucerna through PEG    CODE STATUS: FULL     Patient is a 74y old  Female who presents with a chief complaint of sob (05 Nov 2020 20:49)    24 hour events: No acute events overnight.    REVIEW OF SYSTEMS  Unable to assess 2/2 mental status    T(F): 98 (11-06-20 @ 05:00), Max: 98.8 (11-05-20 @ 12:00)  HR: 57 (11-06-20 @ 06:00) (56 - 127)  BP: 152/67 (11-06-20 @ 06:00) (114/66 - 178/74)  RR: 15 (11-06-20 @ 06:00) (0 - 34)  SpO2: 100% (11-06-20 @ 06:00) (100% - 100%)      Mode: AC/ CMV (Assist Control/ Continuous Mandatory Ventilation), RR (machine): 14, TV (machine): 400, FiO2: 30, PEEP: 5        I&O's Summary    11-05 @ 07:01  -  11-06 @ 07:00  --------------------------------------------------------  IN: 2300 mL / OUT: 1400 mL / NET: 900 mL      PHYSICAL EXAM  General: frail appearing woman, awake but unresponsive, intubated, trached  CNS: contracted bedbound, does not follow commands, withdraws from painful stimuli, opens eyes to name  HEENT: PERRL  Resp: CTABL, trache sight clean, no discharge  CVS: rrr no m/r/g  Abd: soft, nontender, mildly distended. PEG site clean, no erythema, discharge  Ext: No cyanosis, edema  Skin: warm & perfused    MEDICATIONS  cefTRIAXone   IVPB IV Intermittent      dextrose 40% Gel Oral PRN  glucagon  Injectable IntraMuscular PRN  insulin lispro (ADMELOG) corrective regimen sliding scale SubCutaneous    ALBUTerol    90 MICROgram(s) HFA Inhaler Inhalation  ipratropium 17 MICROgram(s) HFA Inhaler Inhalation        aspirin  chewable Oral  enoxaparin Injectable SubCutaneous    famotidine    Tablet Oral  saline laxative (FLEET) Rectal Enema Rectal  sucralfate Oral      calcium carbonate 1250 mG  + Vitamin D (OsCal 500 + D) Oral  dextrose 5%. IV Continuous      atropine 1% Ophthalmic Solution for SubLingual Use SubLingual PRN  chlorhexidine 2% Cloths Topical    lactobacillus acidophilus Oral                          10.0   7.86  )-----------( 197      ( 05 Nov 2020 05:23 )             31.7       11-05    146<H>  |  112<H>  |  20  ----------------------------<  127<H>  3.2<L>   |  27  |  1.00    Ca    8.7      05 Nov 2020 05:23  Phos  3.8     11-05  Mg     2.2     11-05    TPro  6.3  /  Alb  2.5<L>  /  TBili  0.3  /  DBili  x   /  AST  18  /  ALT  27  /  AlkPhos  68  11-05        Radiology: no new imaging  Bedside lung ultrasound: N/A  Bedside ECHO: N/A    CENTRAL LINE: N  REID: N  A-LINE: N                 GLOBAL ISSUE/BEST PRACTICE  Analgesia: Y  Sedation: N  HOB elevation: yes  Stress ulcer prophylaxis: Y  VTE prophylaxis: Y  Glycemic control: Y  Nutrition: glucerna through PEG    CODE STATUS: FULL     Patient is a 74y old  Female who presents with a chief complaint of sob (05 Nov 2020 20:49)    24 hour events: No acute events overnight.    REVIEW OF SYSTEMS  Unable to assess 2/2 mental status    T(F): 98 (11-06-20 @ 05:00), Max: 98.8 (11-05-20 @ 12:00)  HR: 57 (11-06-20 @ 06:00) (56 - 127)  BP: 152/67 (11-06-20 @ 06:00) (114/66 - 178/74)  RR: 15 (11-06-20 @ 06:00) (0 - 34)  SpO2: 100% (11-06-20 @ 06:00) (100% - 100%)      Mode: AC/ CMV (Assist Control/ Continuous Mandatory Ventilation), RR (machine): 14, TV (machine): 400, FiO2: 30, PEEP: 5        I&O's Summary    11-05 @ 07:01  -  11-06 @ 07:00  --------------------------------------------------------  IN: 2300 mL / OUT: 1400 mL / NET: 900 mL      PHYSICAL EXAM  General: frail appearing woman, awake but unresponsive, intubated, trached  CNS: contracted bedbound, does not follow commands, withdraws from painful stimuli, opens eyes to name  HEENT: PERRL  Resp: CTABL, trache sight clean, no discharge  CVS: rrr no m/r/g  Abd: soft, nontender, mildly distended. PEG site clean, no erythema, discharge  Ext: No cyanosis, edema  Skin: warm & perfused    MEDICATIONS  cefTRIAXone   IVPB IV Intermittent      dextrose 40% Gel Oral PRN  glucagon  Injectable IntraMuscular PRN  insulin lispro (ADMELOG) corrective regimen sliding scale SubCutaneous    ALBUTerol    90 MICROgram(s) HFA Inhaler Inhalation  ipratropium 17 MICROgram(s) HFA Inhaler Inhalation        aspirin  chewable Oral  enoxaparin Injectable SubCutaneous    famotidine    Tablet Oral  saline laxative (FLEET) Rectal Enema Rectal  sucralfate Oral      calcium carbonate 1250 mG  + Vitamin D (OsCal 500 + D) Oral  dextrose 5%. IV Continuous      atropine 1% Ophthalmic Solution for SubLingual Use SubLingual PRN  chlorhexidine 2% Cloths Topical    lactobacillus acidophilus Oral                          10.0   7.86  )-----------( 197      ( 05 Nov 2020 05:23 )             31.7       11-05    146<H>  |  112<H>  |  20  ----------------------------<  127<H>  3.2<L>   |  27  |  1.00    Ca    8.7      05 Nov 2020 05:23  Phos  3.8     11-05  Mg     2.2     11-05    TPro  6.3  /  Alb  2.5<L>  /  TBili  0.3  /  DBili  x   /  AST  18  /  ALT  27  /  AlkPhos  68  11-05        CENTRAL LINE: N  REID: N  A-LINE: N                 GLOBAL ISSUE/BEST PRACTICE  Analgesia: Y  Sedation: N  HOB elevation: yes  Stress ulcer prophylaxis: Y  VTE prophylaxis: Y  Glycemic control: Y  Nutrition: glucerna through PEG    CODE STATUS: FULL

## 2021-01-05 NOTE — PROGRESS NOTE ADULT - SUBJECTIVE AND OBJECTIVE BOX
Interval history: no acute events      HPI: 70 yo female admitted with pneumonia, history of trach vent dependent, peg tube due to early onset dementia. Minimal mental status, non communicative at baseline noted to have vomiting after PEG tube feeds x 3 weeks. As per family at bedside, patient also noted to have chronic constipation. Family denies blood in stools. + occult gi bleed    Allergies:  codeine (Hives)      Medications:  piperacillin/tazobactam IVPB. 3.375 Gram(s) IV Intermittent every 8 hours  lactobacillus acidophilus 1 Tablet(s) Oral every 8 hours  iohexol 300 mG (iodine)/mL Oral Solution 500 milliLiter(s) Oral every 1 hour  dextrose 5%. 1000 milliLiter(s) IV Continuous <Continuous>  dextrose 50% Injectable 12.5 Gram(s) IV Push once  dextrose 50% Injectable 25 Gram(s) IV Push once  dextrose 50% Injectable 25 Gram(s) IV Push once  artificial  tears Solution 1 Drop(s) Both EYES four times a day  ALBUTerol    90 MICROgram(s) HFA Inhaler 2 Puff(s) Inhalation every 6 hours  fentaNYL   Patch  12 MICROgram(s)/Hr 1 Patch Transdermal every 72 hours  polyethylene glycol 3350 17 Gram(s) Oral daily  sodium chloride 0.65% Nasal 1 Spray(s) Both Nostrils three times a day PRN  dextrose 5%. 1000 milliLiter(s) IV Continuous <Continuous>  pantoprazole  Injectable 40 milliGRAM(s) IV Push every 12 hours      PMHX/PSHX:  Hypertension  Respiratory failure  Diabetes mellitus  Aphasic stroke  Dementia of frontal lobe type  Tracheostomy in place  Gastrostomy in place  Hx of appendectomy      Family history:  nc          PHYSICAL EXAM:   Vital Signs:  Vital Signs Last 24 Hrs  T(C): 37 (2017 15:05), Max: 38 (2017 11:15)  T(F): 98.6 (2017 15:05), Max: 100.4 (2017 11:15)  HR: 64 (2017 17:00) (63 - 162)  BP: 90/52 (2017 17:00) (84/45 - 172/93)  BP(mean): 66 (2017 17:00) (60 - 82)  RR: 17 (2017 17:00) (17 - 38)  SpO2: 100% (2017 17:00) (87% - 100%)  Daily     Daily Weight in k (2017 14:45)    GENERAL:  non communicative  HEENT:  NC/AT,  conjunctivae clear and pink, no thyromegaly, nodules, adenopathy, no JVD, sclera -anicteric, + trach to vent  CHEST:  Full & symmetric excursion, no increased effort, breath sounds clear  HEART:  Regular rhythm, S1, S2, no murmur/rub/S3/S4, no abdominal bruit, no edema  ABDOMEN:  Soft, non-tender, + distention,  normoactive bowel sounds,  no masses ,no hepato-splenomegaly, no signs of chronic liver disease, + peg tube  EXTEREMITIES:  no cyanosis,clubbing or edema  SKIN:  No rash/erythema/ecchymoses/petechiae/wounds/abscess/warm/dry  NEURO:  Alert, oriented, no asterixis, no tremor, no encephalopathy    LABS:                        14.7   18.4  )-----------( 330      ( 2017 11:45 )             45.1     07-21    142  |  111<H>  |  19  ----------------------------<  78  4.0   |  16<L>  |  1.30    Ca    8.6      2017 11:45    TPro  8.5<H>  /  Alb  3.6  /  TBili  0.9  /  DBili  x   /  AST  42<H>  /  ALT  38  /  AlkPhos  99  -21    LIVER FUNCTIONS - ( 2017 11:45 )  Alb: 3.6 g/dL / Pro: 8.5 g/dL / ALK PHOS: 99 U/L / ALT: 38 U/L / AST: 42 U/L / GGT: x           PT/INR - ( 2017 11:45 )   PT: 12.9 sec;   INR: 1.18 ratio           Urinalysis Basic - ( 2017 11:34 )    Color: Yellow / Appearance: Turbid / S.015 / pH: x  Gluc: x / Ketone: Negative  / Bili: Negative / Urobili: Negative   Blood: x / Protein: 75 mg/dL / Nitrite: Negative   Leuk Esterase: Moderate / RBC: 0-2 /HPF / WBC 0-2   Sq Epi: x / Non Sq Epi: x / Bacteria: Many          Imaging: pt state " I don't know"

## 2021-05-04 NOTE — DIETITIAN INITIAL EVALUATION ADULT. - PROBLEM SELECTOR PROBLEM 2
"    Subjective:     Encounter Date:05/04/21      Patient ID: Sathya Benavides is a 60 y.o. male.    Chief Complaint:  History of Present Illness    Dear Dr. Kaminski,    I had the pleasure of seeing this patient in the office today.  He comes in today for hospital follow-up.    Patient presented March 25.  He presented with acute CVA.  He was found to have bilateral posterior cerebral artery embolic CVAs.  He had an echocardiogram and then a transesophageal cardiogram that demonstrated a small to moderate sized PFO.  He was also found to have mediastinal and right hilar adenopathy along with a 2.3 mm noncalcified lung nodule.  He was seen by Dr. Tony.  For his CVA was placed on chronic anticoagulation, and an event monitor was placed.    Event monitor showed no atrial arrhythmias.  There is no evidence of atrial fibrillation.    He had a PET scan performed.  This showed the right hilar and mediastinal lymphadenopathy with hypermetabolic there is also a mildly enlarged node in the joey hepatis which was hypermetabolic as well.  Biopsy was performed, with bronchoscopy, but I am told that he came back negative although I do not yet have any reports from Dr. Tony's office.  They say that they are supposed to back and see him shortly to follow-up on that.    He has not had any cardiac issues since discharge.  No recurrent neurologic symptoms since discharge.    The following portions of the patient's history were reviewed and updated as appropriate: allergies, current medications, past family history, past medical history, past social history, past surgical history and problem list.    Procedures       Objective:     Vitals:    05/04/21 1026   BP: 136/70   Pulse: 78   Resp: 18   SpO2: 97%   Weight: 75.3 kg (166 lb)   Height: 188 cm (74\")     Body mass index is 21.31 kg/m².      Vitals reviewed.   Constitutional:       General: Not in acute distress.     Appearance: Well-developed. Not diaphoretic.   Eyes:      General:    "      Right eye: No discharge.         Left eye: No discharge.      Conjunctiva/sclera: Conjunctivae normal.      Pupils: Pupils are equal, round, and reactive to light.   HENT:      Head: Normocephalic and atraumatic.      Nose: Nose normal.   Neck:      Thyroid: No thyromegaly.      Trachea: No tracheal deviation.      Lymphadenopathy: No cervical adenopathy.   Pulmonary:      Effort: Pulmonary effort is normal. No respiratory distress.      Breath sounds: Normal breath sounds. No stridor.   Chest:      Chest wall: Not tender to palpatation.   Cardiovascular:      Normal rate. Regular rhythm.      Murmurs: There is no murmur.      . No S3 gallop. No click. No rub.   Pulses:     Intact distal pulses.   Edema:     Peripheral edema absent.   Abdominal:      General: Bowel sounds are normal. There is no distension.      Palpations: Abdomen is soft. There is no abdominal mass.      Tenderness: There is no abdominal tenderness. There is no guarding or rebound.   Musculoskeletal: Normal range of motion.         General: No tenderness or deformity.      Cervical back: Normal range of motion and neck supple. Skin:     General: Skin is warm and dry.      Findings: No erythema or rash.   Neurological:      Mental Status: Alert and oriented to person, place, and time.      Deep Tendon Reflexes: Reflexes are normal and symmetric.   Psychiatric:         Thought Content: Thought content normal.         Data and records reviewed:     Lab Results   Component Value Date    GLUCOSE 107 (H) 03/30/2021    BUN 13 03/30/2021    CREATININE 0.82 03/30/2021    EGFRIFNONA 95 03/30/2021    BCR 15.9 03/30/2021    K 4.1 03/30/2021    CO2 24.4 03/30/2021    CALCIUM 8.6 03/30/2021    ALBUMIN 3.50 03/27/2021    LABIL2 1.3 07/26/2019    AST 14 03/27/2021    ALT 10 03/27/2021     Lab Results   Component Value Date    CHOL 153 03/27/2021    CHOL 163 03/26/2021     Lab Results   Component Value Date    TRIG 110 03/27/2021    TRIG 134 03/26/2021      Lab Results   Component Value Date    HDL 25 (L) 03/27/2021    HDL 25 (L) 03/26/2021     Lab Results   Component Value Date     (H) 03/27/2021     (H) 03/26/2021     Lab Results   Component Value Date    VLDL 20 03/27/2021    VLDL 24 03/26/2021     Lab Results   Component Value Date    LDLHDL 4.24 03/27/2021    LDLHDL 4.45 03/26/2021     CBC    CBC 3/28/21 3/29/21 3/30/21   WBC 7.84 6.71 7.82   RBC 5.07 5.36 5.30   Hemoglobin 14.6 15.0 15.0   Hematocrit 42.8 45.5 44.6   MCV 84.4 84.9 84.2   MCH 28.8 28.0 28.3   MCHC 34.1 33.0 33.6   RDW 12.9 12.8 12.7   Platelets 209 216 233           CT Angiogram Neck    Result Date: 3/26/2021  1.  No evidence of hemorrhage. Only a subtle area of decreased attenuation is appreciated involving the white matter of the left occipital lobe which corresponds to the subacute infarct noted on the MRI examination of 03/24/2021. 2.  0% stenosis involving the carotid bifurcations. 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 3.  Emphysematous disease involving the lung apices bilaterally as well as pleural thickening all of which is more prominent on the right. 4.  Right hilar and subcarinal adenopathy, only partially visualized. Further evaluation with a CT examination of the chest is recommended in order to completely assess the lungs and mediastinal structures.    Radiation dose reduction techniques were utilized, including automated exposure control and exposure modulation based on body size.  This report was finalized on 3/26/2021 1:50 PM by Dr. Marco Bernstein M.D.      CT Chest With Contrast Diagnostic    Result Date: 3/28/2021  1. There is evidence of mediastinal and right hilar adenopathy. This is seen in conjunction with a 2.3 cm noncalcified nodule at the base of the right lower lobe and asymmetric irregular soft tissue thickening at the apex of the right upper lobe. The right upper lobe apical asymmetric lesion may in part represent scarring.  However, ultimately the imaging features are suspicious for a primary right lung malignancy with secondary right hilar and mediastinal metastases/evangelista involvement. Correlation with a PET/CT study is recommended to determine if the lesion at the base of the right lung or the lesion at the apex of the right upper lobe is hypermetabolic and to guide sampling for tissue diagnosis. 2. Moderate bilateral emphysematous change.  Radiation dose reduction techniques were utilized, including automated exposure control and exposure modulation based on body size.  This report was finalized on 3/28/2021 12:04 PM by Dr. Josue Pierce M.D.      CT Angiogram Head    Result Date: 3/26/2021  1.  No evidence of hemorrhage. Only a subtle area of decreased attenuation is appreciated involving the white matter of the left occipital lobe which corresponds to the subacute infarct noted on the MRI examination of 03/24/2021. 2.  0% stenosis involving the carotid bifurcations. 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 3.  Emphysematous disease involving the lung apices bilaterally as well as pleural thickening all of which is more prominent on the right. 4.  Right hilar and subcarinal adenopathy, only partially visualized. Further evaluation with a CT examination of the chest is recommended in order to completely assess the lungs and mediastinal structures.    Radiation dose reduction techniques were utilized, including automated exposure control and exposure modulation based on body size.  This report was finalized on 3/26/2021 1:50 PM by Dr. Marco Bernstein M.D.      NM Pet Skull Base To Mid Thigh    Result Date: 4/21/2021  1. The right hilar and mediastinal lymphadenopathy is hypermetabolic and there is a mildly enlarged node at the joey hepatis which is mildly hypermetabolic as well. There are no hypermetabolic pulmonary lesions. Tissue diagnosis should be considered with bronchoscopy. 2. Conservative surveillance is  Respiratory failure recommended for the photopenic 2.4 cm right lung base nodule and the asymmetric nodular scarring at the right apex with a chest CT in 3 months.  This report was finalized on 4/21/2021 2:50 PM by Dr. Sharron Bey M.D.      Results for orders placed during the hospital encounter of 03/25/21    Adult Transesophageal Echo ([facility]) W/ Cont if Necessary Per Protocol    Interpretation Summary  · Left ventricular ejection fraction appears to be 61 - 65%  · Left ventricular wall thickness is consistent with mild concentric hypertrophy  · Left ventricular diastolic function is consistent with (grade I) impaired relaxation  · No evidence of a left atrial appendage thrombus was present.  · The agitated saline study was consistent with a small to moderate PFO  · The noncoronary leaflet of the aortic valve is heavily calcified and restricted in motion  · There were mild plaques in the descending thoracic aorta. There were moderate plaques in the aortic arch. All plaques were nonmobile.  · There is no evidence of pericardial effusion.          Assessment:          Diagnosis Plan   1. Hospital discharge follow-up     2. Cerebrovascular accident (CVA) due to bilateral embolism of posterior cerebral arteries (CMS/HCC)     3. Mediastinal adenopathy            Plan:       1.  CVA due to bilateral emboli-felt secondary to PFO.  Currently on chronic anticoagulation.  Consideration of potential closure in the future, pending determination of the etiology for the hypermetabolic adenopathy  2.  Mediastinal and right hilar hypermetabolic adenopathy-patient is undergoing evaluation currently.    Thank you very much for allowing us to participate in the care of this pleasant patient.  Please don't hesitate to call if I can be of assistance in any way.      Current Outpatient Medications:   •  apixaban (ELIQUIS) 5 MG tablet tablet, Take 5 mg by mouth 2 (Two) Times a Day., Disp: , Rfl:   •  atorvastatin (LIPITOR) 80 MG tablet, Take 80 mg by mouth  Daily., Disp: , Rfl:   •  lansoprazole (PREVACID) 30 MG capsule, Take 30 mg by mouth Daily., Disp: , Rfl:   •  tamsulosin (FLOMAX) 0.4 MG capsule 24 hr capsule, Take 1 capsule by mouth Daily., Disp: , Rfl:   •  traZODone (DESYREL) 50 MG tablet, Take 50 mg by mouth Every Night., Disp: , Rfl:   •  levothyroxine (SYNTHROID, LEVOTHROID) 25 MCG tablet, Take 1 tablet by mouth Daily for 30 days., Disp: 30 tablet, Rfl: 0         No follow-ups on file.

## 2021-06-04 RX ORDER — SUCRALFATE 1 G
10 TABLET ORAL
Qty: 0 | Refills: 0 | DISCHARGE
Start: 2021-06-04

## 2021-06-14 ENCOUNTER — INPATIENT (INPATIENT)
Facility: HOSPITAL | Age: 76
LOS: 8 days | Discharge: HOME | DRG: 870 | End: 2021-06-23
Attending: INTERNAL MEDICINE | Admitting: INTERNAL MEDICINE
Payer: MEDICARE

## 2021-06-14 VITALS
DIASTOLIC BLOOD PRESSURE: 106 MMHG | OXYGEN SATURATION: 100 % | TEMPERATURE: 102 F | HEIGHT: 63 IN | RESPIRATION RATE: 36 BRPM | SYSTOLIC BLOOD PRESSURE: 143 MMHG | HEART RATE: 109 BPM

## 2021-06-14 DIAGNOSIS — Z93.1 GASTROSTOMY STATUS: Chronic | ICD-10-CM

## 2021-06-14 DIAGNOSIS — Z93.0 TRACHEOSTOMY STATUS: Chronic | ICD-10-CM

## 2021-06-14 LAB
ALBUMIN SERPL ELPH-MCNC: 3.5 G/DL — SIGNIFICANT CHANGE UP (ref 3.3–5)
ALP SERPL-CCNC: 94 U/L — SIGNIFICANT CHANGE UP (ref 40–120)
ALT FLD-CCNC: 28 U/L — SIGNIFICANT CHANGE UP (ref 12–78)
ANION GAP SERPL CALC-SCNC: 11 MMOL/L — SIGNIFICANT CHANGE UP (ref 5–17)
APTT BLD: 37.7 SEC — HIGH (ref 27.5–35.5)
AST SERPL-CCNC: 34 U/L — SIGNIFICANT CHANGE UP (ref 15–37)
BASOPHILS # BLD AUTO: 0 K/UL — SIGNIFICANT CHANGE UP (ref 0–0.2)
BASOPHILS NFR BLD AUTO: 0 % — SIGNIFICANT CHANGE UP (ref 0–2)
BILIRUB SERPL-MCNC: 0.5 MG/DL — SIGNIFICANT CHANGE UP (ref 0.2–1.2)
BUN SERPL-MCNC: 40 MG/DL — HIGH (ref 7–23)
CALCIUM SERPL-MCNC: 9.4 MG/DL — SIGNIFICANT CHANGE UP (ref 8.5–10.1)
CHLORIDE SERPL-SCNC: 107 MMOL/L — SIGNIFICANT CHANGE UP (ref 96–108)
CO2 SERPL-SCNC: 22 MMOL/L — SIGNIFICANT CHANGE UP (ref 22–31)
CREAT SERPL-MCNC: 1.7 MG/DL — HIGH (ref 0.5–1.3)
EOSINOPHIL # BLD AUTO: 0 K/UL — SIGNIFICANT CHANGE UP (ref 0–0.5)
EOSINOPHIL NFR BLD AUTO: 0 % — SIGNIFICANT CHANGE UP (ref 0–6)
GLUCOSE SERPL-MCNC: 264 MG/DL — HIGH (ref 70–99)
HCT VFR BLD CALC: 41.3 % — SIGNIFICANT CHANGE UP (ref 34.5–45)
HGB BLD-MCNC: 12.3 G/DL — SIGNIFICANT CHANGE UP (ref 11.5–15.5)
INR BLD: 1.1 RATIO — SIGNIFICANT CHANGE UP (ref 0.88–1.16)
LACTATE SERPL-SCNC: 6.7 MMOL/L — CRITICAL HIGH (ref 0.7–2)
LYMPHOCYTES # BLD AUTO: 11 % — LOW (ref 13–44)
LYMPHOCYTES # BLD AUTO: 2.68 K/UL — SIGNIFICANT CHANGE UP (ref 1–3.3)
MCHC RBC-ENTMCNC: 27.6 PG — SIGNIFICANT CHANGE UP (ref 27–34)
MCHC RBC-ENTMCNC: 29.8 GM/DL — LOW (ref 32–36)
MCV RBC AUTO: 92.6 FL — SIGNIFICANT CHANGE UP (ref 80–100)
MONOCYTES # BLD AUTO: 1.22 K/UL — HIGH (ref 0–0.9)
MONOCYTES NFR BLD AUTO: 5 % — SIGNIFICANT CHANGE UP (ref 2–14)
NEUTROPHILS # BLD AUTO: 20.2 K/UL — HIGH (ref 1.8–7.4)
NEUTROPHILS NFR BLD AUTO: 83 % — HIGH (ref 43–77)
NRBC # BLD: SIGNIFICANT CHANGE UP /100 WBCS (ref 0–0)
PLATELET # BLD AUTO: 471 K/UL — HIGH (ref 150–400)
POTASSIUM SERPL-MCNC: 5 MMOL/L — SIGNIFICANT CHANGE UP (ref 3.5–5.3)
POTASSIUM SERPL-SCNC: 5 MMOL/L — SIGNIFICANT CHANGE UP (ref 3.5–5.3)
PROT SERPL-MCNC: 8.9 G/DL — HIGH (ref 6–8.3)
PROTHROM AB SERPL-ACNC: 12.8 SEC — SIGNIFICANT CHANGE UP (ref 10.6–13.6)
RBC # BLD: 4.46 M/UL — SIGNIFICANT CHANGE UP (ref 3.8–5.2)
RBC # FLD: 15.3 % — HIGH (ref 10.3–14.5)
SODIUM SERPL-SCNC: 140 MMOL/L — SIGNIFICANT CHANGE UP (ref 135–145)
WBC # BLD: 24.34 K/UL — HIGH (ref 3.8–10.5)
WBC # FLD AUTO: 24.34 K/UL — HIGH (ref 3.8–10.5)

## 2021-06-14 PROCEDURE — 93010 ELECTROCARDIOGRAM REPORT: CPT

## 2021-06-14 PROCEDURE — 99285 EMERGENCY DEPT VISIT HI MDM: CPT

## 2021-06-14 PROCEDURE — 71045 X-RAY EXAM CHEST 1 VIEW: CPT | Mod: 26

## 2021-06-14 RX ORDER — ACETAMINOPHEN 500 MG
650 TABLET ORAL ONCE
Refills: 0 | Status: COMPLETED | OUTPATIENT
Start: 2021-06-14 | End: 2021-06-14

## 2021-06-14 RX ORDER — SODIUM CHLORIDE 9 MG/ML
1550 INJECTION INTRAMUSCULAR; INTRAVENOUS; SUBCUTANEOUS ONCE
Refills: 0 | Status: COMPLETED | OUTPATIENT
Start: 2021-06-14 | End: 2021-06-14

## 2021-06-14 RX ORDER — SODIUM CHLORIDE 9 MG/ML
1000 INJECTION INTRAMUSCULAR; INTRAVENOUS; SUBCUTANEOUS ONCE
Refills: 0 | Status: COMPLETED | OUTPATIENT
Start: 2021-06-14 | End: 2021-06-14

## 2021-06-14 RX ORDER — VANCOMYCIN HCL 1 G
1000 VIAL (EA) INTRAVENOUS ONCE
Refills: 0 | Status: COMPLETED | OUTPATIENT
Start: 2021-06-14 | End: 2021-06-14

## 2021-06-14 RX ORDER — PIPERACILLIN AND TAZOBACTAM 4; .5 G/20ML; G/20ML
3.38 INJECTION, POWDER, LYOPHILIZED, FOR SOLUTION INTRAVENOUS ONCE
Refills: 0 | Status: COMPLETED | OUTPATIENT
Start: 2021-06-14 | End: 2021-06-14

## 2021-06-14 RX ADMIN — PIPERACILLIN AND TAZOBACTAM 3.38 GRAM(S): 4; .5 INJECTION, POWDER, LYOPHILIZED, FOR SOLUTION INTRAVENOUS at 23:56

## 2021-06-14 RX ADMIN — PIPERACILLIN AND TAZOBACTAM 200 GRAM(S): 4; .5 INJECTION, POWDER, LYOPHILIZED, FOR SOLUTION INTRAVENOUS at 23:16

## 2021-06-14 RX ADMIN — SODIUM CHLORIDE 650 MILLILITER(S): 9 INJECTION INTRAMUSCULAR; INTRAVENOUS; SUBCUTANEOUS at 23:16

## 2021-06-14 RX ADMIN — Medication 650 MILLIGRAM(S): at 22:58

## 2021-06-14 NOTE — ED PROVIDER NOTE - PROGRESS NOTE DETAILS
discussed case with Dr. HEATH Roca, will admit to [facility], aware ICU consult called ICU will accept, requesting CT chest/a/p noncontrast

## 2021-06-14 NOTE — ED PROVIDER NOTE - CARE PLAN
Principal Discharge DX:	Acute respiratory distress  Secondary Diagnosis:	Pneumonia due to infectious organism, unspecified laterality, unspecified part of lung

## 2021-06-14 NOTE — ED ADULT NURSE NOTE - NSIMPLEMENTINTERV_GEN_ALL_ED
Implemented All Fall Risk Interventions:  [place] to call system. Call bell, personal items and telephone within reach. Instruct patient to call for assistance. Room bathroom lighting operational. Non-slip footwear when patient is off stretcher. Physically safe environment: no spills, clutter or unnecessary equipment. Stretcher in lowest position, wheels locked, appropriate side rails in place. Provide visual cue, wrist band, yellow gown, etc. Monitor gait and stability. Monitor for mental status changes and reorient to person, place, and time. Review medications for side effects contributing to fall risk. Reinforce activity limits and safety measures with patient and family.

## 2021-06-14 NOTE — ED ADULT NURSE NOTE - OBJECTIVE STATEMENT
Brought in by EMS from [facility] for respiratory distress. Patient was given Lopressor 50mg via PEG tube and Fleet enema PTA. Patient on tracheostomy on ventilator, contractured extremities noted. IV cannula inserted on the left foot and blood drawn. On arrival patient is diaphoretic and tachypneic. Rectal temp taken noted @ 101.8F. Rectal tylenol suppository given as ordered. suppository

## 2021-06-14 NOTE — ED PROVIDER NOTE - PHYSICAL EXAMINATION
Gen: In mild to moderate resp distress, trach/vented  Head/eyes: NC/AT  ENT: trach/vented  Neck: supple  Pulm/lung: coarse breaths sounds b/l, right more than left  CV/heart: +tachycardic, no M/R/G, +2 dist pulses (radial, pedal DP/PT, popliteal)  GI/Abd: soft, ND, NT, g-tube noted  Musculoskeletal: no edema/erythema/cyanosis, contracted extremities no C/T/L spine ttp  Skin: no rash, no vesicles, no petechaie, no ecchymosis, no swelling  Neuro: nonverbal, trach/vented

## 2021-06-14 NOTE — CONSULT NOTE ADULT - SUBJECTIVE AND OBJECTIVE BOX
[facility]     Patient is a 75y old  Female who presents with a chief complaint of Pneumonia, Urosepsis (15 Zay 2021 02:11)      HPI:      HPI:    75y Female for Cardiology Consult    PAST MEDICAL & SURGICAL HISTORY:  Dementia of frontal lobe type    Aphasic stroke    Diabetes mellitus    Respiratory failure    Hypertension    GERD (gastroesophageal reflux disease)    Constipation    Respiratory failure    CVA (cerebral vascular accident)    HTN (hypertension)    DM (diabetes mellitus)    Advanced dementia    COVID-19 virus detected    Quadriplegia    Pneumonia    Hx of appendectomy    Gastrostomy in place    Tracheostomy in place    Tracheostomy tube present    Feeding by G-tube        FAMILY HISTORY:  No pertinent family history in first degree relatives        SOCIAL HISTORY:   Alcohol:  Smoking:    Allergies    codeine (Hives)    Intolerances        MEDICATIONS  (STANDING):  ALBUTerol    90 MICROgram(s) HFA Inhaler 2 Puff(s) Inhalation every 6 hours  aspirin  chewable 81 milliGRAM(s) Oral daily  chlorhexidine 2% Cloths 1 Application(s) Topical <User Schedule>  dextrose 40% Gel 15 Gram(s) Oral once  dextrose 5%. 1000 milliLiter(s) (50 mL/Hr) IV Continuous <Continuous>  dextrose 5%. 1000 milliLiter(s) (100 mL/Hr) IV Continuous <Continuous>  dextrose 50% Injectable 25 Gram(s) IV Push once  dextrose 50% Injectable 12.5 Gram(s) IV Push once  dextrose 50% Injectable 25 Gram(s) IV Push once  glucagon  Injectable 1 milliGRAM(s) IntraMuscular once  heparin   Injectable 5000 Unit(s) SubCutaneous every 8 hours  insulin lispro (ADMELOG) corrective regimen sliding scale   SubCutaneous every 6 hours  ipratropium 17 MICROgram(s) HFA Inhaler 1 Puff(s) Inhalation every 6 hours  meropenem  IVPB      meropenem  IVPB 1000 milliGRAM(s) IV Intermittent every 12 hours  pantoprazole   Suspension 40 milliGRAM(s) Oral before breakfast    MEDICATIONS  (PRN):  acetaminophen    Suspension .. 650 milliGRAM(s) Oral every 6 hours PRN Temp greater or equal to 38C (100.4F)      REVIEW OF SYSTEMS:  CONSTITUTIONAL: No fever, weight loss, chills, shakes, or fat  RESPIRATORY: No cough, wheezing, hemoptysis, or shortness of breath  CARDIOVASCULAR: No chest pain, dyspnea, palpitations, dizziness, syncope, paroxysmal nocturnal dyspnea, orthopnea, or arm or leg swelling  GASTROINTESTINAL: No abdominal  or epigastric pain, nausea, vomiting, hematemesis, diarrhea, constipation, melena or bright red bloo  NEUROLOGICAL: No headaches, memory loss, slurred speech, limb weakness, loss of strength, numbness, or tremors  SKIN: No itching, burning, rashes, or lesions  ENDOCRINE: No heat or cold intolerance, or hair loss  MUSCULOSKELETAL: No joint pain or swelling, muscle, back, or extremity pain  HEME/LYMPH: No easy bruising or bleeding gums  ALLERY AND IMMUNOLOGIC: No hives or rash.    Vital Signs Last 24 Hrs  T(C): 36.2 (15 Zay 2021 03:24), Max: 38.8 (2021 22:50)  T(F): 97.2 (15 Zay 2021 03:24), Max: 101.8 (2021 22:50)  HR: 56 (15 Zay 2021 05:00) (50 - 109)  BP: 98/47 (15 Zay 2021 05:00) (91/55 - 176/67)  BP(mean): 72 (15 Zay 2021 04:00) (72 - 96)  RR: 14 (15 Zay 2021 05:00) (14 - 38)  SpO2: 100% (15 Zay 2021 05:00) (95% - 100%)    PHYSICAL EXAM:  HEAD:  Atraumatic, Normocephalic  EYES: EOMI, PERRLA, conjunctiva and sclera clear  NECK: Supple and normal thyroid.  No JVD or carotid bruit.   HEART: S1, S2 regular , 1/6 soft ejection systolic murmur in mitral area , no thrill and no gallops .  PULMONARY: Bilateral vesicular breathing , few scattered ronchi and few scattered rales are present .  ABDOMEN: Soft nontender and positive bowl sounds   EXTREMITIES:  No clubbing, cyanosis, or pedal  edema  NEUROLOGICAL: AAOX3 , no focal deficit .    LABS:                        12.3   24.34 )-----------( 471      ( 2021 23:02 )             41.3     06-14    140  |  107  |  40<H>  ----------------------------<  264<H>  5.0   |  22  |  1.70<H>    Ca    9.4      2021 23:02    TPro  8.9<H>  /  Alb  3.5  /  TBili  0.5  /  DBili  x   /  AST  34  /  ALT  28  /  AlkPhos  94  06-14        PT/INR - ( 2021 23:02 )   PT: 12.8 sec;   INR: 1.10 ratio         PTT - ( 2021 23:02 )  PTT:37.7 sec  Urinalysis Basic - ( 15 Zay 2021 01:24 )    Color: Yellow / Appearance: Turbid / S.010 / pH: x  Gluc: x / Ketone: Trace  / Bili: Negative / Urobili: Negative   Blood: x / Protein: 100 / Nitrite: Negative   Leuk Esterase: Moderate / RBC: 6-10 /HPF / WBC 6-10   Sq Epi: x / Non Sq Epi: Few / Bacteria: Moderate      BNP      EKG:  ECHO:  IMAGING:    Assessment and Plan :     Will continue to follow during hospital course and give further recommendations as needed . Thanks for your referral . if any questions please contact me at office [phone] [phone])  JOSIE SON MD [place] [id number]  [address]  OFFICE : [phone]  CELL : [phone]    CARDIOLOGY CONSULT :     Patient is a 75y old  Female who presents with a chief complaint of Pneumonia, Urosepsis (15 Zay 2021 02:11)      HPI:      HPI:    75y Female for Cardiology Consult    PAST MEDICAL & SURGICAL HISTORY:  Dementia of frontal lobe type    Aphasic stroke    Diabetes mellitus    Respiratory failure    Hypertension    GERD (gastroesophageal reflux disease)    Constipation    Respiratory failure    CVA (cerebral vascular accident)    HTN (hypertension)    DM (diabetes mellitus)    Advanced dementia    COVID-19 virus detected    Quadriplegia    Pneumonia    Hx of appendectomy    Gastrostomy in place    Tracheostomy in place    Tracheostomy tube present    Feeding by G-tube        FAMILY HISTORY:  No pertinent family history in first degree relatives        SOCIAL HISTORY:   Alcohol:  Smoking:    Allergies    codeine (Hives)    Intolerances        MEDICATIONS  (STANDING):  ALBUTerol    90 MICROgram(s) HFA Inhaler 2 Puff(s) Inhalation every 6 hours  aspirin  chewable 81 milliGRAM(s) Oral daily  chlorhexidine 2% Cloths 1 Application(s) Topical <User Schedule>  dextrose 40% Gel 15 Gram(s) Oral once  dextrose 5%. 1000 milliLiter(s) (50 mL/Hr) IV Continuous <Continuous>  dextrose 5%. 1000 milliLiter(s) (100 mL/Hr) IV Continuous <Continuous>  dextrose 50% Injectable 25 Gram(s) IV Push once  dextrose 50% Injectable 12.5 Gram(s) IV Push once  dextrose 50% Injectable 25 Gram(s) IV Push once  glucagon  Injectable 1 milliGRAM(s) IntraMuscular once  heparin   Injectable 5000 Unit(s) SubCutaneous every 8 hours  insulin lispro (ADMELOG) corrective regimen sliding scale   SubCutaneous every 6 hours  ipratropium 17 MICROgram(s) HFA Inhaler 1 Puff(s) Inhalation every 6 hours  meropenem  IVPB      meropenem  IVPB 1000 milliGRAM(s) IV Intermittent every 12 hours  pantoprazole   Suspension 40 milliGRAM(s) Oral before breakfast    MEDICATIONS  (PRN):  acetaminophen    Suspension .. 650 milliGRAM(s) Oral every 6 hours PRN Temp greater or equal to 38C (100.4F)      REVIEW OF SYSTEMS:  CONSTITUTIONAL: No fever, weight loss, chills, shakes, or fat  RESPIRATORY: No cough, wheezing, hemoptysis, or shortness of breath  CARDIOVASCULAR: No chest pain, dyspnea, palpitations, dizziness, syncope, paroxysmal nocturnal dyspnea, orthopnea, or arm or leg swelling  GASTROINTESTINAL: No abdominal  or epigastric pain, nausea, vomiting, hematemesis, diarrhea, constipation, melena or bright red bloo  NEUROLOGICAL: No headaches, memory loss, slurred speech, limb weakness, loss of strength, numbness, or tremors  SKIN: No itching, burning, rashes, or lesions  ENDOCRINE: No heat or cold intolerance, or hair loss  MUSCULOSKELETAL: No joint pain or swelling, muscle, back, or extremity pain  HEME/LYMPH: No easy bruising or bleeding gums  ALLERY AND IMMUNOLOGIC: No hives or rash.    Vital Signs Last 24 Hrs  T(C): 36.2 (15 Zay 2021 03:24), Max: 38.8 (2021 22:50)  T(F): 97.2 (15 Zay 2021 03:24), Max: 101.8 (2021 22:50)  HR: 56 (15 Zay 2021 05:00) (50 - 109)  BP: 98/47 (15 Zay 2021 05:00) (91/55 - 176/67)  BP(mean): 72 (15 Zay 2021 04:00) (72 - 96)  RR: 14 (15 Zay 2021 05:00) (14 - 38)  SpO2: 100% (15 Zay 2021 05:00) (95% - 100%)    PHYSICAL EXAM:  HEAD:  Atraumatic, Normocephalic  EYES: EOMI, PERRLA, conjunctiva and sclera clear  NECK: Supple and normal thyroid.  No JVD or carotid bruit.   HEART: S1, S2 regular , 1/6 soft ejection systolic murmur in mitral area , no thrill and no gallops .  PULMONARY: Bilateral vesicular breathing , few scattered ronchi and few scattered rales are present .  ABDOMEN: Soft nontender and positive bowl sounds   EXTREMITIES:  No clubbing, cyanosis, or pedal  edema  NEUROLOGICAL: AAOX3 , no focal deficit .    LABS:                        12.3   24.34 )-----------( 471      ( 2021 23:02 )             41.3     06-14    140  |  107  |  40<H>  ----------------------------<  264<H>  5.0   |  22  |  1.70<H>    Ca    9.4      2021 23:02    TPro  8.9<H>  /  Alb  3.5  /  TBili  0.5  /  DBili  x   /  AST  34  /  ALT  28  /  AlkPhos  94  06-14        PT/INR - ( 2021 23:02 )   PT: 12.8 sec;   INR: 1.10 ratio         PTT - ( 2021 23:02 )  PTT:37.7 sec  Urinalysis Basic - ( 15 Zay 2021 01:24 )    Color: Yellow / Appearance: Turbid / S.010 / pH: x  Gluc: x / Ketone: Trace  / Bili: Negative / Urobili: Negative   Blood: x / Protein: 100 / Nitrite: Negative   Leuk Esterase: Moderate / RBC: 6-10 /HPF / WBC 6-10   Sq Epi: x / Non Sq Epi: Few / Bacteria: Moderate      Assessment and Plan :   FULL CONSULT DICTATED .  75 years old female with H/O CVA , Hypertension , respiratory failure , DM has fever and SOB and has respiratory distress and has pneumonia . Continue I/V antibiotics and I/V hydration . Prognosis guarded .  Will continue to follow during hospital course and give further recommendations as needed . Thanks for your referral . if any questions please contact me at office ([phone])

## 2021-06-14 NOTE — CONSULT NOTE ADULT - TIME BILLING
in direct care of patient , reviewing labs and other results and adjusting medications and discussion with other consultants , MIRIAN and RN and PA

## 2021-06-14 NOTE — ED PROVIDER NOTE - OBJECTIVE STATEMENT
76 yo female hx of aphasic stroke, covid 19, trach/vented, dm BIBEMS from [facility] for respiratory distress, found to have fever here 101.8, was given 50mg lopressor via g-tube and 1 fleet enema PTA. 74 yo female hx of aphasic stroke, covid 19, trach/vented, dm BIBEMS from [facility] for respiratory distress, found to have fever here 101.8, was given 50mg lopressor via g-tube and 1 fleet enema PTA.    Full code

## 2021-06-15 DIAGNOSIS — Z29.9 ENCOUNTER FOR PROPHYLACTIC MEASURES, UNSPECIFIED: ICD-10-CM

## 2021-06-15 DIAGNOSIS — F03.90 UNSPECIFIED DEMENTIA WITHOUT BEHAVIORAL DISTURBANCE: ICD-10-CM

## 2021-06-15 DIAGNOSIS — K59.00 CONSTIPATION, UNSPECIFIED: ICD-10-CM

## 2021-06-15 DIAGNOSIS — A41.9 SEPSIS, UNSPECIFIED ORGANISM: ICD-10-CM

## 2021-06-15 DIAGNOSIS — J96.90 RESPIRATORY FAILURE, UNSPECIFIED, UNSPECIFIED WHETHER WITH HYPOXIA OR HYPERCAPNIA: ICD-10-CM

## 2021-06-15 DIAGNOSIS — E87.2 ACIDOSIS: ICD-10-CM

## 2021-06-15 DIAGNOSIS — R06.03 ACUTE RESPIRATORY DISTRESS: ICD-10-CM

## 2021-06-15 DIAGNOSIS — N17.9 ACUTE KIDNEY FAILURE, UNSPECIFIED: ICD-10-CM

## 2021-06-15 DIAGNOSIS — E11.9 TYPE 2 DIABETES MELLITUS WITHOUT COMPLICATIONS: ICD-10-CM

## 2021-06-15 DIAGNOSIS — K21.9 GASTRO-ESOPHAGEAL REFLUX DISEASE WITHOUT ESOPHAGITIS: ICD-10-CM

## 2021-06-15 LAB
A1C WITH ESTIMATED AVERAGE GLUCOSE RESULT: 8.5 % — HIGH (ref 4–5.6)
ALBUMIN SERPL ELPH-MCNC: 2.5 G/DL — LOW (ref 3.3–5)
ALP SERPL-CCNC: 60 U/L — SIGNIFICANT CHANGE UP (ref 40–120)
ALT FLD-CCNC: 18 U/L — SIGNIFICANT CHANGE UP (ref 12–78)
ANION GAP SERPL CALC-SCNC: 10 MMOL/L — SIGNIFICANT CHANGE UP (ref 5–17)
APPEARANCE UR: ABNORMAL
AST SERPL-CCNC: 24 U/L — SIGNIFICANT CHANGE UP (ref 15–37)
BASE EXCESS BLDA CALC-SCNC: -2.1 MMOL/L — LOW (ref -2–2)
BASOPHILS # BLD AUTO: 0.03 K/UL — SIGNIFICANT CHANGE UP (ref 0–0.2)
BASOPHILS NFR BLD AUTO: 0.2 % — SIGNIFICANT CHANGE UP (ref 0–2)
BILIRUB SERPL-MCNC: 0.4 MG/DL — SIGNIFICANT CHANGE UP (ref 0.2–1.2)
BILIRUB UR-MCNC: NEGATIVE — SIGNIFICANT CHANGE UP
BLOOD GAS COMMENTS ARTERIAL: SIGNIFICANT CHANGE UP
BUN SERPL-MCNC: 38 MG/DL — HIGH (ref 7–23)
CALCIUM SERPL-MCNC: 7.9 MG/DL — LOW (ref 8.5–10.1)
CHLORIDE SERPL-SCNC: 113 MMOL/L — HIGH (ref 96–108)
CO2 SERPL-SCNC: 24 MMOL/L — SIGNIFICANT CHANGE UP (ref 22–31)
COLOR SPEC: YELLOW — SIGNIFICANT CHANGE UP
CREAT SERPL-MCNC: 1.3 MG/DL — SIGNIFICANT CHANGE UP (ref 0.5–1.3)
DIFF PNL FLD: ABNORMAL
EOSINOPHIL # BLD AUTO: 0.01 K/UL — SIGNIFICANT CHANGE UP (ref 0–0.5)
EOSINOPHIL NFR BLD AUTO: 0.1 % — SIGNIFICANT CHANGE UP (ref 0–6)
ESTIMATED AVERAGE GLUCOSE: 197 MG/DL — HIGH (ref 68–114)
GLUCOSE SERPL-MCNC: 123 MG/DL — HIGH (ref 70–99)
GLUCOSE UR QL: NEGATIVE — SIGNIFICANT CHANGE UP
GRAM STN FLD: SIGNIFICANT CHANGE UP
HCO3 BLDA-SCNC: 23 MMOL/L — SIGNIFICANT CHANGE UP (ref 23–27)
HCT VFR BLD CALC: 31.2 % — LOW (ref 34.5–45)
HGB BLD-MCNC: 9.7 G/DL — LOW (ref 11.5–15.5)
HOROWITZ INDEX BLDA+IHG-RTO: 40 — SIGNIFICANT CHANGE UP
IMM GRANULOCYTES NFR BLD AUTO: 1.4 % — SIGNIFICANT CHANGE UP (ref 0–1.5)
KETONES UR-MCNC: ABNORMAL
LACTATE SERPL-SCNC: 2.9 MMOL/L — HIGH (ref 0.7–2)
LEUKOCYTE ESTERASE UR-ACNC: ABNORMAL
LYMPHOCYTES # BLD AUTO: 1.83 K/UL — SIGNIFICANT CHANGE UP (ref 1–3.3)
LYMPHOCYTES # BLD AUTO: 14.6 % — SIGNIFICANT CHANGE UP (ref 13–44)
MAGNESIUM SERPL-MCNC: 2.5 MG/DL — SIGNIFICANT CHANGE UP (ref 1.6–2.6)
MCHC RBC-ENTMCNC: 27.9 PG — SIGNIFICANT CHANGE UP (ref 27–34)
MCHC RBC-ENTMCNC: 31.1 GM/DL — LOW (ref 32–36)
MCV RBC AUTO: 89.7 FL — SIGNIFICANT CHANGE UP (ref 80–100)
MONOCYTES # BLD AUTO: 0.65 K/UL — SIGNIFICANT CHANGE UP (ref 0–0.9)
MONOCYTES NFR BLD AUTO: 5.2 % — SIGNIFICANT CHANGE UP (ref 2–14)
MRSA PCR RESULT.: SIGNIFICANT CHANGE UP
NEUTROPHILS # BLD AUTO: 9.88 K/UL — HIGH (ref 1.8–7.4)
NEUTROPHILS NFR BLD AUTO: 78.5 % — HIGH (ref 43–77)
NITRITE UR-MCNC: NEGATIVE — SIGNIFICANT CHANGE UP
NRBC # BLD: 0 /100 WBCS — SIGNIFICANT CHANGE UP (ref 0–0)
PCO2 BLDA: 34 MMHG — SIGNIFICANT CHANGE UP (ref 32–46)
PH BLDA: 7.42 — SIGNIFICANT CHANGE UP (ref 7.35–7.45)
PH UR: 7 — SIGNIFICANT CHANGE UP (ref 5–8)
PHOSPHATE SERPL-MCNC: 3.1 MG/DL — SIGNIFICANT CHANGE UP (ref 2.5–4.5)
PLATELET # BLD AUTO: 258 K/UL — SIGNIFICANT CHANGE UP (ref 150–400)
PO2 BLDA: 166 MMHG — HIGH (ref 74–108)
POTASSIUM SERPL-MCNC: 4.1 MMOL/L — SIGNIFICANT CHANGE UP (ref 3.5–5.3)
POTASSIUM SERPL-SCNC: 4.1 MMOL/L — SIGNIFICANT CHANGE UP (ref 3.5–5.3)
PROT SERPL-MCNC: 5.9 G/DL — LOW (ref 6–8.3)
PROT UR-MCNC: 100
RAPID RVP RESULT: SIGNIFICANT CHANGE UP
RBC # BLD: 3.48 M/UL — LOW (ref 3.8–5.2)
RBC # FLD: 14.7 % — HIGH (ref 10.3–14.5)
S AUREUS DNA NOSE QL NAA+PROBE: SIGNIFICANT CHANGE UP
SAO2 % BLDA: 99 % — HIGH (ref 92–96)
SARS-COV-2 RNA SPEC QL NAA+PROBE: SIGNIFICANT CHANGE UP
SODIUM SERPL-SCNC: 147 MMOL/L — HIGH (ref 135–145)
SP GR SPEC: 1.01 — SIGNIFICANT CHANGE UP (ref 1.01–1.02)
SPECIMEN SOURCE: SIGNIFICANT CHANGE UP
TROPONIN I SERPL-MCNC: <.015 NG/ML — SIGNIFICANT CHANGE UP (ref 0.01–0.04)
UROBILINOGEN FLD QL: NEGATIVE — SIGNIFICANT CHANGE UP
WBC # BLD: 12.57 K/UL — HIGH (ref 3.8–10.5)
WBC # FLD AUTO: 12.57 K/UL — HIGH (ref 3.8–10.5)

## 2021-06-15 PROCEDURE — 71250 CT THORAX DX C-: CPT | Mod: 26

## 2021-06-15 PROCEDURE — 99291 CRITICAL CARE FIRST HOUR: CPT

## 2021-06-15 PROCEDURE — 74176 CT ABD & PELVIS W/O CONTRAST: CPT | Mod: 26

## 2021-06-15 RX ORDER — FENTANYL CITRATE 50 UG/ML
50 INJECTION INTRAVENOUS ONCE
Refills: 0 | Status: DISCONTINUED | OUTPATIENT
Start: 2021-06-15 | End: 2021-06-15

## 2021-06-15 RX ORDER — SODIUM CHLORIDE 9 MG/ML
2000 INJECTION, SOLUTION INTRAVENOUS ONCE
Refills: 0 | Status: COMPLETED | OUTPATIENT
Start: 2021-06-15 | End: 2021-06-15

## 2021-06-15 RX ORDER — LACTOBACILLUS ACIDOPHILUS 100MM CELL
1 CAPSULE ORAL
Qty: 0 | Refills: 0 | DISCHARGE

## 2021-06-15 RX ORDER — ASPIRIN/CALCIUM CARB/MAGNESIUM 324 MG
81 TABLET ORAL DAILY
Refills: 0 | Status: DISCONTINUED | OUTPATIENT
Start: 2021-06-15 | End: 2021-06-23

## 2021-06-15 RX ORDER — ACETAMINOPHEN 500 MG
650 TABLET ORAL EVERY 6 HOURS
Refills: 0 | Status: DISCONTINUED | OUTPATIENT
Start: 2021-06-15 | End: 2021-06-23

## 2021-06-15 RX ORDER — PANTOPRAZOLE SODIUM 20 MG/1
40 TABLET, DELAYED RELEASE ORAL DAILY
Refills: 0 | Status: DISCONTINUED | OUTPATIENT
Start: 2021-06-15 | End: 2021-06-15

## 2021-06-15 RX ORDER — INSULIN GLARGINE 100 [IU]/ML
20 INJECTION, SOLUTION SUBCUTANEOUS AT BEDTIME
Refills: 0 | Status: DISCONTINUED | OUTPATIENT
Start: 2021-06-15 | End: 2021-06-23

## 2021-06-15 RX ORDER — HEPARIN SODIUM 5000 [USP'U]/ML
5000 INJECTION INTRAVENOUS; SUBCUTANEOUS EVERY 8 HOURS
Refills: 0 | Status: DISCONTINUED | OUTPATIENT
Start: 2021-06-15 | End: 2021-06-18

## 2021-06-15 RX ORDER — DEXTROSE 50 % IN WATER 50 %
25 SYRINGE (ML) INTRAVENOUS ONCE
Refills: 0 | Status: DISCONTINUED | OUTPATIENT
Start: 2021-06-15 | End: 2021-06-23

## 2021-06-15 RX ORDER — DEXTROSE 50 % IN WATER 50 %
15 SYRINGE (ML) INTRAVENOUS ONCE
Refills: 0 | Status: DISCONTINUED | OUTPATIENT
Start: 2021-06-15 | End: 2021-06-23

## 2021-06-15 RX ORDER — SODIUM CHLORIDE 9 MG/ML
1000 INJECTION, SOLUTION INTRAVENOUS
Refills: 0 | Status: DISCONTINUED | OUTPATIENT
Start: 2021-06-15 | End: 2021-06-23

## 2021-06-15 RX ORDER — PANTOPRAZOLE SODIUM 20 MG/1
40 TABLET, DELAYED RELEASE ORAL
Refills: 0 | Status: DISCONTINUED | OUTPATIENT
Start: 2021-06-15 | End: 2021-06-15

## 2021-06-15 RX ORDER — POLYETHYLENE GLYCOL 3350 17 G/17G
17 POWDER, FOR SOLUTION ORAL DAILY
Refills: 0 | Status: DISCONTINUED | OUTPATIENT
Start: 2021-06-15 | End: 2021-06-20

## 2021-06-15 RX ORDER — IPRATROPIUM BROMIDE 0.2 MG/ML
1 SOLUTION, NON-ORAL INHALATION EVERY 6 HOURS
Refills: 0 | Status: DISCONTINUED | OUTPATIENT
Start: 2021-06-15 | End: 2021-06-23

## 2021-06-15 RX ORDER — CHLORHEXIDINE GLUCONATE 213 G/1000ML
1 SOLUTION TOPICAL
Refills: 0 | Status: DISCONTINUED | OUTPATIENT
Start: 2021-06-15 | End: 2021-06-23

## 2021-06-15 RX ORDER — FERROUS SULFATE 325(65) MG
5 TABLET ORAL
Qty: 0 | Refills: 0 | DISCHARGE

## 2021-06-15 RX ORDER — DEXTROSE 50 % IN WATER 50 %
12.5 SYRINGE (ML) INTRAVENOUS ONCE
Refills: 0 | Status: DISCONTINUED | OUTPATIENT
Start: 2021-06-15 | End: 2021-06-23

## 2021-06-15 RX ORDER — SENNA PLUS 8.6 MG/1
2 TABLET ORAL AT BEDTIME
Refills: 0 | Status: DISCONTINUED | OUTPATIENT
Start: 2021-06-15 | End: 2021-06-23

## 2021-06-15 RX ORDER — MEROPENEM 1 G/30ML
1000 INJECTION INTRAVENOUS EVERY 12 HOURS
Refills: 0 | Status: DISCONTINUED | OUTPATIENT
Start: 2021-06-15 | End: 2021-06-17

## 2021-06-15 RX ORDER — ALBUTEROL 90 UG/1
2 AEROSOL, METERED ORAL EVERY 6 HOURS
Refills: 0 | Status: DISCONTINUED | OUTPATIENT
Start: 2021-06-15 | End: 2021-06-23

## 2021-06-15 RX ORDER — GLUCAGON INJECTION, SOLUTION 0.5 MG/.1ML
1 INJECTION, SOLUTION SUBCUTANEOUS ONCE
Refills: 0 | Status: DISCONTINUED | OUTPATIENT
Start: 2021-06-15 | End: 2021-06-23

## 2021-06-15 RX ORDER — PANTOPRAZOLE SODIUM 20 MG/1
40 TABLET, DELAYED RELEASE ORAL
Refills: 0 | Status: DISCONTINUED | OUTPATIENT
Start: 2021-06-15 | End: 2021-06-23

## 2021-06-15 RX ORDER — MEROPENEM 1 G/30ML
1000 INJECTION INTRAVENOUS ONCE
Refills: 0 | Status: COMPLETED | OUTPATIENT
Start: 2021-06-15 | End: 2021-06-15

## 2021-06-15 RX ORDER — MEROPENEM 1 G/30ML
INJECTION INTRAVENOUS
Refills: 0 | Status: DISCONTINUED | OUTPATIENT
Start: 2021-06-15 | End: 2021-06-17

## 2021-06-15 RX ORDER — INSULIN LISPRO 100/ML
VIAL (ML) SUBCUTANEOUS EVERY 6 HOURS
Refills: 0 | Status: DISCONTINUED | OUTPATIENT
Start: 2021-06-15 | End: 2021-06-23

## 2021-06-15 RX ADMIN — ALBUTEROL 2 PUFF(S): 90 AEROSOL, METERED ORAL at 09:45

## 2021-06-15 RX ADMIN — Medication 250 MILLIGRAM(S): at 00:02

## 2021-06-15 RX ADMIN — HEPARIN SODIUM 5000 UNIT(S): 5000 INJECTION INTRAVENOUS; SUBCUTANEOUS at 05:05

## 2021-06-15 RX ADMIN — CHLORHEXIDINE GLUCONATE 1 APPLICATION(S): 213 SOLUTION TOPICAL at 05:05

## 2021-06-15 RX ADMIN — FENTANYL CITRATE 50 MICROGRAM(S): 50 INJECTION INTRAVENOUS at 23:57

## 2021-06-15 RX ADMIN — Medication 2: at 17:53

## 2021-06-15 RX ADMIN — SENNA PLUS 2 TABLET(S): 8.6 TABLET ORAL at 22:23

## 2021-06-15 RX ADMIN — Medication 1 PUFF(S): at 09:45

## 2021-06-15 RX ADMIN — Medication 81 MILLIGRAM(S): at 12:31

## 2021-06-15 RX ADMIN — MEROPENEM 100 MILLIGRAM(S): 1 INJECTION INTRAVENOUS at 02:38

## 2021-06-15 RX ADMIN — PANTOPRAZOLE SODIUM 40 MILLIGRAM(S): 20 TABLET, DELAYED RELEASE ORAL at 05:06

## 2021-06-15 RX ADMIN — Medication 1 PUFF(S): at 13:49

## 2021-06-15 RX ADMIN — Medication 1 PUFF(S): at 20:27

## 2021-06-15 RX ADMIN — SODIUM CHLORIDE 1000 MILLILITER(S): 9 INJECTION INTRAMUSCULAR; INTRAVENOUS; SUBCUTANEOUS at 00:21

## 2021-06-15 RX ADMIN — Medication 650 MILLIGRAM(S): at 00:45

## 2021-06-15 RX ADMIN — ALBUTEROL 2 PUFF(S): 90 AEROSOL, METERED ORAL at 20:27

## 2021-06-15 RX ADMIN — SODIUM CHLORIDE 1550 MILLILITER(S): 9 INJECTION INTRAMUSCULAR; INTRAVENOUS; SUBCUTANEOUS at 00:18

## 2021-06-15 RX ADMIN — SODIUM CHLORIDE 1000 MILLILITER(S): 9 INJECTION, SOLUTION INTRAVENOUS at 00:52

## 2021-06-15 RX ADMIN — ALBUTEROL 2 PUFF(S): 90 AEROSOL, METERED ORAL at 13:49

## 2021-06-15 RX ADMIN — HEPARIN SODIUM 5000 UNIT(S): 5000 INJECTION INTRAVENOUS; SUBCUTANEOUS at 22:23

## 2021-06-15 RX ADMIN — MEROPENEM 100 MILLIGRAM(S): 1 INJECTION INTRAVENOUS at 17:52

## 2021-06-15 RX ADMIN — HEPARIN SODIUM 5000 UNIT(S): 5000 INJECTION INTRAVENOUS; SUBCUTANEOUS at 14:30

## 2021-06-15 RX ADMIN — INSULIN GLARGINE 20 UNIT(S): 100 INJECTION, SOLUTION SUBCUTANEOUS at 22:23

## 2021-06-15 NOTE — DIETITIAN INITIAL EVALUATION ADULT. - ADD RECOMMEND
1) Monitor pt's tolerance to TF, 2) Recommend MVI/daily, 3) Monitor pt's weight, skin, edema, GI distress

## 2021-06-15 NOTE — H&P ADULT - NSHPLABSRESULTS_GEN_ALL_CORE
< from: CT Chest No Cont (06.15.21 @ 01:24) >    FINDINGS:  There is motion artifact present which degrades image quality.    Chest CT:  Interval resolution of predominantly peripherally distributed groundglass opacities in both lungs and right upper and middle lobe airspace consolidation. There are nonspecific patchy airspace opacitiesand both lower lobes. Resolved small right pleural effusion and residual trace left pleural effusion. There is no pneumothorax. Tracheostomy tube is again present within the trachea with stable diameter of the trachea of about 3.2 cm. The trachea andmain bronchi are clear.    The heart is within normal limits size. There is a trace pericardial effusion. The thoracic aorta and main pulmonary arteries are normal in caliber.    There is no significant supraclavicular, axillary, mediastinal or hilaradenopathy.    The thyroid gland is unremarkable.    The osseous structures within the thorax have no acute finding.    CT abdomen/pelvis:  The unenhanced appearance of the liver, spleen, pancreas, gallbladder and right adrenal gland is unremarkable.Stable indeterminate 1.5 cm left adrenal nodule. There is no hydronephrosis or perinephric stranding. There is a vague 0.8 cm hyperdense lesion in the upper pole of the left kidney.    Gastrostomy tube is again in the stomach. There is no bowel obstruction, free air or free fluid. The appendix is surgically removed. The rectum is moderately distended with fluid type fecal material without significant wall thickening. Questionable mild wall thickening of the sigmoid colon. No significant pericolonic inflammatory change. Scattered diverticula without diverticulitis.    The abdominal aorta is normal in caliber. There is no retroperitoneal hematoma. There is no significant adenopathy in the upper abdomen, retroperitoneum or pelvis.    The urinarybladder is collapsed around a Woody balloon catheter. Tiny amount of air in the bladder lumen likely due to recent instrumentation. The uterus and adnexal structures are within normal limits.    There is a healing left femoral intertrochanteric fracture with callus and heterotopic bone formation. There are no acute osseous abnormalities.    IMPRESSION:  Chest CT: Motion degraded exam. Nonspecific patchy airspace opacities in the lower lobes which can be on an infectious basis or possibly related to aspiration. Clinical correlation recommended. Residual trace left pleural effusion.    CT abdomen/pelvis: Questionable mild sigmoid colon thickening.  Indeterminant 0.8 cm hyperdense lesion in the upper pole left kidney.    < end of copied text > < from: CT Chest No Cont (06.15.21 @ 01:24) >    FINDINGS:  There is motion artifact present which degrades image quality.    Chest CT:  Interval resolution of predominantly peripherally distributed groundglass opacities in both lungs and right upper and middle lobe airspace consolidation. There are nonspecific patchy airspace opacities and both lower lobes. Resolved small right pleural effusion and residual trace left pleural effusion. There is no pneumothorax. Tracheostomy tube is again present within the trachea with stable diameter of the trachea of about 3.2 cm. The trachea and main bronchi are clear.    The heart is within normal limits size. There is a trace pericardial effusion. The thoracic aorta and main pulmonary arteries are normal in caliber.    There is no significant supraclavicular, axillary, mediastinal or hilar adenopathy.    The thyroid gland is unremarkable.    The osseous structures within the thorax have no acute finding.    CT abdomen/pelvis:  The unenhanced appearance of the liver, spleen, pancreas, gallbladder and right adrenal gland is unremarkable .Stable indeterminate 1.5 cm left adrenal nodule. There is no hydronephrosis or perinephric stranding. There is a vague 0.8 cm hyperdense lesion in the upper pole of the left kidney.    Gastrostomy tube is again in the stomach. There is no bowel obstruction, free air or free fluid. The appendix is surgically removed. The rectum is moderately distended with fluid type fecal material without significant wall thickening. Questionable mild wall thickening of the sigmoid colon. No significant pericolonic inflammatory change. Scattered diverticula without diverticulitis.    The abdominal aorta is normal in caliber. There is no retroperitoneal hematoma. There is no significant adenopathy in the upper abdomen, retroperitoneum or pelvis.    The urinarybladder is collapsed around a Woody balloon catheter. Tiny amount of air in the bladder lumen likely due to recent instrumentation. The uterus and adnexal structures are within normal limits.    There is a healing left femoral intertrochanteric fracture with callus and heterotopic bone formation. There are no acute osseous abnormalities.    IMPRESSION:  Chest CT: Motion degraded exam. Nonspecific patchy airspace opacities in the lower lobes which can be on an infectious basis or possibly related to aspiration. Clinical correlation recommended. Residual trace left pleural effusion.    CT abdomen/pelvis: Questionable mild sigmoid colon thickening.  Indeterminant 0.8 cm hyperdense lesion in the upper pole left kidney.    < end of copied text >

## 2021-06-15 NOTE — CONSULT NOTE ADULT - SUBJECTIVE AND OBJECTIVE BOX
[facility] DIVISION of INFECTIOUS DISEASE  Arturo Olivas MD PhD, Haylee Umanzor MD, Andressa Brantley MD, Billy Valenzuela MD  and providing coverage with Alexa Canas MD and Eusebio Chairez MD  Providing Infectious Disease Consultations at [facility], [facility], [facility]'s    Office# [phone] to schedule follow up appointments  Answering Service for urgent calls or New Consults [phone]  Cell# to text for urgent issues Arturo Olivas [phone]     HPI:  74 yo Female ,[facility] resident with hx of  PMH Frontotemporal dementia, CVA, chronic vent dependent respiratory failure, dysphagia s/p PEG, recurrent UTI/VAP presents with septic shock from [facility] facility. She was last admitted at [facility] 10/ 2020 with proteus UTI ,sepsis ,hypernatremia and RYAN .Pseudomonas grew in sputum resistant to zosyn last time Admitted for septic workup and evaluation,send blood and urine cx,serial lactate levels,monitor vitals closley,ivfs hydration,monitor urine output and renal profile,iv abx initiated e. On arrival to ER found to be febrile to 102, WBC 20k, lactate 6.7. CXR with haziness of entire right side, urine appears cloudy. Med hx is significant for Advanced dementia ,s/p  Aphasic stroke , Constipation  ,COVID-19   ,CVA (cerebral vascular accident)  ,Dementia of frontal lobe type  ,Diabetes mellitus  ,DM (diabetes mellitus)type  2   ,GERD (gastroesophageal reflux disease)  ,HTN (hypertension)  ,Hypertension  ,Pneumonia  ,Quadriplegia  ,Respiratory failure ,s/p tracheostomy and peg Palliative care consult requested ,to discuss advance directives and complete /update  [facility]  (15 Zay 2021 07:06)      PAST MEDICAL & SURGICAL HISTORY:  Dementia of frontal lobe type    Aphasic stroke    Diabetes mellitus    Respiratory failure    Hypertension    GERD (gastroesophageal reflux disease)    Constipation    Respiratory failure    CVA (cerebral vascular accident)    HTN (hypertension)    DM (diabetes mellitus)    Advanced dementia    COVID-19 virus detected    Quadriplegia    Pneumonia    Type II diabetes mellitus    Hx of appendectomy    Gastrostomy in place    Tracheostomy in place    Tracheostomy tube present    Feeding by G-tube        Antimicrobials  meropenem  IVPB      meropenem  IVPB 1000 milliGRAM(s) IV Intermittent every 12 hours      Immunological      Other  acetaminophen    Suspension .. 650 milliGRAM(s) Oral every 6 hours PRN  ALBUTerol    90 MICROgram(s) HFA Inhaler 2 Puff(s) Inhalation every 6 hours  aspirin  chewable 81 milliGRAM(s) Oral daily  chlorhexidine 2% Cloths 1 Application(s) Topical <User Schedule>  dextrose 40% Gel 15 Gram(s) Oral once  dextrose 5%. 1000 milliLiter(s) IV Continuous <Continuous>  dextrose 5%. 1000 milliLiter(s) IV Continuous <Continuous>  dextrose 50% Injectable 25 Gram(s) IV Push once  dextrose 50% Injectable 12.5 Gram(s) IV Push once  dextrose 50% Injectable 25 Gram(s) IV Push once  glucagon  Injectable 1 milliGRAM(s) IntraMuscular once  heparin   Injectable 5000 Unit(s) SubCutaneous every 8 hours  insulin lispro (ADMELOG) corrective regimen sliding scale   SubCutaneous every 6 hours  ipratropium 17 MICROgram(s) HFA Inhaler 1 Puff(s) Inhalation every 6 hours  pantoprazole   Suspension 40 milliGRAM(s) Oral before breakfast      Allergies    codeine (Hives)    Intolerances        SOCIAL HISTORY:  Social History:  prior to  admission patient resided in [facility] ,no etoh or tobacco reported (15 Zay 2021 07:06)      FAMILY HISTORY:  No pertinent family history in first degree relatives        ROS:    limited by nonvrbal status    Vital Signs Last 24 Hrs  T(C): 36.8 (15 Zay 2021 08:10), Max: 38.8 (2021 22:50)  T(F): 98.2 (15 Zay 2021 08:10), Max: 101.8 (2021 22:50)  HR: 58 (15 Zay 2021 10:00) (50 - 109)  BP: 98/44 (15 Zay 2021 10:00) (88/48 - 176/67)  BP(mean): 64 (15 Zay 2021 10:00) (59 - 96)  RR: 14 (15 Zay 2021 10:00) (14 - 38)  SpO2: 98% (15 Zay 2021 10:00) (95% - 100%)    PE:  WDWN in no distress  HEENT:  NC, PERRL, sclerae anicteric, conjunctivae clear, EOMI.  Sinuses nontender, no nasal exudate.  No buccal or pharyngeal lesions, erythema or exudate  Neck:  Supple, no adenopathy, intubated via trach  Lungs:  No accessory muscle use, bilaterally clear to auscultation  Cor:  distant  Abd:  Symmetric, normoactive BS.  Soft, nontender, no masses, guarding or rebound.  Liver and spleen not enlarged, clean G-tube  Extrem:  No cyanosis or edema  Skin:  No rashes.  Neuro: alert, nonverbal  Musc: contracted    Mode: AC/ CMV (Assist Control/ Continuous Mandatory Ventilation), RR (machine): 14, TV (machine): 400, FiO2: 25, PEEP: 5, ITime: 1, MAP: 11, PIP: 31    LABS:                        9.7    12.57 )-----------( 258      ( 15 Zay 2021 05:22 )             31.2       WBC Count: 12.57 K/uL (06-15-21 @ 05:22)  WBC Count: 24.34 K/uL (21 @ 23:02)      -15    147<H>  |  113<H>  |  38<H>  ----------------------------<  123<H>  4.1   |  24  |  1.30    Ca    7.9<L>      15 Zay 2021 05:22  Phos  3.1     -15  Mg     2.5     -15    TPro  5.9<L>  /  Alb  2.5<L>  /  TBili  0.4  /  DBili  x   /  AST  24  /  ALT  18  /  AlkPhos  60  -15      Creatinine, Serum: 1.30 mg/dL (06-15-21 @ 05:22)  Creatinine, Serum: 1.70 mg/dL (21 @ 23:02)      Urinalysis Basic - ( 15 Zay 2021 01:24 )    Color: Yellow / Appearance: Turbid / S.010 / pH: x  Gluc: x / Ketone: Trace  / Bili: Negative / Urobili: Negative   Blood: x / Protein: 100 / Nitrite: Negative   Leuk Esterase: Moderate / RBC: 6-10 /HPF / WBC 6-10   Sq Epi: x / Non Sq Epi: Few / Bacteria: Moderate              MICROBIOLOGY:      RADIOLOGY & ADDITIONAL STUDIES:    --< from: CT Chest No Cont (06.15.21 @ 01:24) >    EXAM:  CT ABDOMEN AND PELVIS                          EXAM:  CT CHEST                            PROCEDURE DATE:  06/15/2021          INTERPRETATION:  CLINICAL INFORMATION: Sepsis. Acute respiratory distress.  74 yo female hx of aphasic stroke, covid 19, trach/vented, dm BIBEMS from [facility] for respiratory distress, found to have fever here 101.8, was given 50mg lopressor via g-tube and 1 fleet enema PTA.    COMPARISON: CT chest from 2020 and CT abdomen pelvis from 2017    CONTRAST/COMPLICATIONS:  IV Contrast: NONE  0 cc administered   0 cc discarded  Oral Contrast: NONE  Complications: None reported at time of study completion    CLINICAL HISTORY:    TECHNIQUE: CT of the chest, abdomen and pelvis without IV contrast. Oral contrast was withheld.  Transaxial images were acquired from the thoracic inlet through to the pubic symphysis without the administration of IV contrast. Coronal and sagittal reformatted images are provided.    FINDINGS:  There is motion artifact present which degrades image quality.    Chest CT:  Interval resolution of predominantly peripherally distributed groundglass opacities in both lungs and right upper and middle lobe airspace consolidation. There are nonspecific patchy airspace opacitiesand both lower lobes. Resolved small right pleural effusion and residual trace left pleural effusion. There is no pneumothorax. Tracheostomy tube is again present within the trachea with stable diameter of the trachea of about 3.2 cm. The trachea andmain bronchi are clear.    The heart is within normal limits size. There is a trace pericardial effusion. The thoracic aorta and main pulmonary arteries are normal in caliber.    There is no significant supraclavicular, axillary, mediastinal or hilaradenopathy.    The thyroid gland is unremarkable.    The osseous structures within the thorax have no acute finding.    CT abdomen/pelvis:  The unenhanced appearance of the liver, spleen, pancreas, gallbladder and right adrenal gland is unremarkable.Stable indeterminate 1.5 cm left adrenal nodule. There is no hydronephrosis or perinephric stranding. There is a vague 0.8 cm hyperdense lesion in the upper pole of the left kidney.    Gastrostomy tube is again in the stomach. There is no bowel obstruction, free air or free fluid. The appendix is surgically removed. The rectum is moderately distended with fluid type fecal material without significant wall thickening. Questionable mild wall thickening of the sigmoid colon. No significant pericolonic inflammatory change. Scattered diverticula without diverticulitis.    The abdominal aorta is normal in caliber. There is no retroperitoneal hematoma. There is no significant adenopathy in the upper abdomen, retroperitoneum or pelvis.    The urinarybladder is collapsed around a Woody balloon catheter. Tiny amount of air in the bladder lumen likely due to recent instrumentation. The uterus and adnexal structures are within normal limits.    There is a healing left femoral intertrochanteric fracture with callus and heterotopic bone formation. There are no acute osseous abnormalities.    IMPRESSION:  Chest CT: Motion degraded exam. Nonspecific patchy airspace opacities in the lower lobes which can be on an infectious basis or possibly related to aspiration. Clinical correlation recommended. Residual trace left pleural effusion.    CT abdomen/pelvis: Questionable mild sigmoid colon thickening.  Indeterminant 0.8 cm hyperdense lesion in the upper pole left kidney.

## 2021-06-15 NOTE — INPATIENT CERTIFICATION FOR MEDICARE PATIENTS - PHYSICIAN CONCUR
Subjective   Patient ID: Deloris is a 30 year old female.    CHIEF COMPLAINT  Chief Complaint   Patient presents with   • Lesion     left ankle, possible bite or boil, red, swelling to ankle, painful       HPI  29 yo female present with a bump on her left ankle.  She state she noticed it about 4-5 days ago at that time it was a little bump but has gotten progressively worse and bigger.  She state it is tender and rate her discomfort 6/10.      HISTORY    does not have a problem list on file.   has no past surgical history on file.  family history is not on file.  currently has no medications in their medication list.  No current outpatient medications on file.     No current facility-administered medications for this visit.      has No Known Allergies.    ROS  Review of Systems   Skin: Positive for wound.       Objective   Physical Exam  Vitals signs and nursing note reviewed.   Constitutional:       Appearance: Normal appearance. She is normal weight.   Skin:     General: Skin is warm and dry.      Findings: Wound present.      Comments: Boil to left medial ankle. Redness, swelling to entire ankle.  Firm and pitting   Neurological:      Mental Status: She is alert.         ORDERS  No orders of the defined types were placed in this encounter.      ASSESSMENT  Assessment     PLAN  Problem List Items Addressed This Visit     None      Visit Diagnoses     Insect bite of left ankle, initial encounter    -  Primary    Relevant Medications    cephalexin (KEFLEX) 500 MG capsule        
I concur with the Admission Order and I certify that services are provided in accordance with Section 42 CFR § 412.3

## 2021-06-15 NOTE — H&P ADULT - HISTORY OF PRESENT ILLNESS
76 yo Female ,[facility] resident with hx of  PMH Frontotemporal dementia, CVA, chronic vent dependent respiratory failure, dysphagia s/p PEG, recurrent UTI/VAP presents with septic shock from [facility] facility. She was last admitted at [facility] 10/ 2020 with proteus UTI ,sepsis ,hypernatremia and RYAN .Pseudomonas grew in sputum resistant to zosyn last time Admitted for septic workup and evaluation,send blood and urine cx,serial lactate levels,monitor vitals closley,ivfs hydration,monitor urine output and renal profile,iv abx initiated e. On arrival to ER found to be febrile to 102, WBC 20k, lactate 6.7. CXR with haziness of entire right side, urine appears cloudy. Med hx is significant for Advanced dementia ,s/p  Aphasic stroke , Constipation  ,COVID-19   ,CVA (cerebral vascular accident)  ,Dementia of frontal lobe type  ,Diabetes mellitus  ,DM (diabetes mellitus)  ,GERD (gastroesophageal reflux disease)  ,HTN (hypertension)  ,Hypertension  ,Pneumonia  ,Quadriplegia  ,Respiratory failure ,s/p tracheostomy and peg Palliative care consult requested ,to discuss advance directives and complete /update  [facility]  76 yo Female ,[facility] resident with hx of  PMH Frontotemporal dementia, CVA, chronic vent dependent respiratory failure, dysphagia s/p PEG, recurrent UTI/VAP presents with septic shock from [facility] facility. She was last admitted at [facility] 10/ 2020 with proteus UTI ,sepsis ,hypernatremia and RYAN .Pseudomonas grew in sputum resistant to zosyn last time Admitted for septic workup and evaluation,send blood and urine cx,serial lactate levels,monitor vitals closley,ivfs hydration,monitor urine output and renal profile,iv abx initiated e. On arrival to ER found to be febrile to 102, WBC 20k, lactate 6.7. CXR with haziness of entire right side, urine appears cloudy. Med hx is significant for Advanced dementia ,s/p  Aphasic stroke , Constipation  ,COVID-19   ,CVA (cerebral vascular accident)  ,Dementia of frontal lobe type  ,Diabetes mellitus  ,DM (diabetes mellitus)type  2   ,GERD (gastroesophageal reflux disease)  ,HTN (hypertension)  ,Hypertension  ,Pneumonia  ,Quadriplegia  ,Respiratory failure ,s/p tracheostomy and peg Palliative care consult requested ,to discuss advance directives and complete /update  [facility]

## 2021-06-15 NOTE — CONSULT NOTE ADULT - ASSESSMENT
74F PMH Frontotemporal dementia, CVA, chronic vent dependent respiratory failure, dysphagia s/p PEG, recurrent UTI/VAP presents with septic shock from [facility] facility. Source most likely urine, r/o PNA as well.     Plan discussed with E-ICU attending Dr. Massey    Problem List:  1) Septic shock   2) RYAN  3) Metabolic acidosis   4) UTI  5) r/o PNA  6) chronic respiratory failure     NEURO: baseline dementia from previous notes, unable to follow commands, but awake and alert.     CV: BP low s/p 2L IVF, appears volume down based on physical exam and POCUS will bolus additional 2L IVF. Will start levophed to maintain MAP >65 if needed. Start midodrine if needed.     PULM: 40%/+5, currently tolerating vent settings well, obtain sputum culture. Home dose inhalers ordered.     GI: NPO except meds for now, diet in AM if appropriate    RENAL: RYAN, suspect prerenal from sepsis, dehydration, IVF ordered repeat lactate and metabolic panel in 4-6 hours    ID: temp 102, WBC 20k, appears septic. Source most likely urine as it appears cloudy and she has history of UTI. F/u UA and culture. Possible PNA as well. Grew pseudomonas in sputum resistant to zosyn in past will cover with meropenem. Grew pan sensitive proteus in urine. Vancomycin x1 dose as well. Check MRSA nares.     HEME: DVT-P with heparin  ENDO: insulin sliding scale    DISPO: ICU level of care

## 2021-06-15 NOTE — DIETITIAN INITIAL EVALUATION ADULT. - ENTERAL
When medically feasible recommend to start TF of Glucerna 1.5 at 20ml and increase by 10ml every 6hrs until goal rate is reached of 50ml x 20hrs for a total volume of 1,000ml/day, provided 1,500 kcal/day, 83 gram protein/day, 759ml/ day of water. Defer additional free water to medical team as pt is currently hypernatremic.

## 2021-06-15 NOTE — H&P ADULT - PROBLEM SELECTOR PLAN 1
2/2 to UTI and probable R PNA - Admitted for septic workup and evaluation ,send blood and urine cx,serial lactate levels,monitor vitals closley,ivfs hydration ,monitor urine output and renal profile ,iv abx initiated

## 2021-06-15 NOTE — H&P ADULT - ASSESSMENT
74 yo Female ,[facility] resident with hx of  PMH Frontotemporal dementia, CVA, chronic vent dependent respiratory failure, dysphagia s/p PEG, recurrent UTI/VAP presents with septic shock from [facility] facility. She was last admitted at [facility] 10/ 2020 with proteus UTI ,sepsis ,hypernatremia and RYAN .Pseudomonas grew in sputum resistant to zosyn last time Admitted for septic workup and evaluation,send blood and urine cx,serial lactate levels,monitor vitals closley,ivfs hydration,monitor urine output and renal profile,iv abx initiated e. On arrival to ER found to be febrile to 102, WBC 20k, lactate 6.7. CXR with haziness of entire right side, urine appears cloudy. Med hx is significant for Advanced dementia ,s/p  Aphasic stroke , Constipation  ,COVID-19   ,CVA (cerebral vascular accident)  ,Dementia of frontal lobe type  ,Diabetes mellitus  ,DM (diabetes mellitus)  ,GERD (gastroesophageal reflux disease)  ,HTN (hypertension)  ,Hypertension  ,Pneumonia  ,Quadriplegia  ,Respiratory failure ,s/p tracheostomy and peg Palliative care consult requested ,to discuss advance directives and complete /update  [facility]

## 2021-06-15 NOTE — H&P ADULT - TIME BILLING
75minutes spent on this visit, 50% visit time spent in care co-ordination with other attendings and counselling patient ,requesting necessary consults ,informing family about status & plan of care .I have discussed care plan with [facility] wellness/admitting /nursing   department ,outpatient PCP , hospital consultants , ER physician & med staff .

## 2021-06-15 NOTE — CONSULT NOTE ADULT - SUBJECTIVE AND OBJECTIVE BOX
Patient is a 75y old  Female who presents with a chief complaint of Resp distress     HPI: 74F PMH Frontotemporal dementia, CVA, chronic vent dependent respiratory failure, dysphagia s/p PEG, recurrent UTI/VAP presents with septic shock from [facility] facility. She was last at [facility] in NOV 2020 with proteus UTI and VAP. Pseudomonas grew in sputum resistant to zosyn last time. On arrival to ER found to be febrile to 102, WBC 20k, lactate 6.7. CXR with haziness of entire right side, urine appears cloudy. Patient seen and examined at the bedside. She is awake, appears comfortable, on 40% and 5 of PEEP. BP low at 89/55. Got 2L IVF so far.     PAST MEDICAL & SURGICAL HISTORY:  Dementia of frontal lobe type    Aphasic stroke    Diabetes mellitus    Respiratory failure    Hypertension    GERD (gastroesophageal reflux disease)    Constipation    Respiratory failure    CVA (cerebral vascular accident)    HTN (hypertension)    DM (diabetes mellitus)    Advanced dementia    COVID-19 virus detected    Quadriplegia    Pneumonia    Hx of appendectomy    Gastrostomy in place    Tracheostomy in place    Tracheostomy tube present    Feeding by G-tube        Review of Systems:  unable to obtain due to patient's clinical condition     Medications:  meropenem  IVPB      meropenem  IVPB 1000 milliGRAM(s) IV Intermittent once  meropenem  IVPB 1000 milliGRAM(s) IV Intermittent every 12 hours      ALBUTerol    90 MICROgram(s) HFA Inhaler 2 Puff(s) Inhalation every 6 hours  ipratropium 17 MICROgram(s) HFA Inhaler 1 Puff(s) Inhalation every 6 hours    acetaminophen    Suspension .. 650 milliGRAM(s) Oral every 6 hours PRN      aspirin  chewable 81 milliGRAM(s) Oral daily  heparin   Injectable 5000 Unit(s) SubCutaneous every 8 hours    pantoprazole    Tablet 40 milliGRAM(s) Oral before breakfast      dextrose 40% Gel 15 Gram(s) Oral once  dextrose 50% Injectable 25 Gram(s) IV Push once  dextrose 50% Injectable 12.5 Gram(s) IV Push once  dextrose 50% Injectable 25 Gram(s) IV Push once  glucagon  Injectable 1 milliGRAM(s) IntraMuscular once  insulin lispro (ADMELOG) corrective regimen sliding scale   SubCutaneous every 6 hours    dextrose 5%. 1000 milliLiter(s) IV Continuous <Continuous>  dextrose 5%. 1000 milliLiter(s) IV Continuous <Continuous>  lactated ringers Bolus 2000 milliLiter(s) IV Bolus once      chlorhexidine 2% Cloths 1 Application(s) Topical <User Schedule>        Mode: AC/ CMV (Assist Control/ Continuous Mandatory Ventilation)  RR (machine): 14  TV (machine): 400  FiO2: 40  PEEP: 5  ITime: 1  MAP: 16  PIP: 35      ICU Vital Signs Last 24 Hrs  T(C): 37 (15 Zay 2021 00:45), Max: 38.8 (14 Jun 2021 22:50)  T(F): 98.6 (15 Zay 2021 00:45), Max: 101.8 (14 Jun 2021 22:50)  HR: 86 (15 Zay 2021 00:45) (75 - 109)  BP: 130/62 (15 Zay 2021 00:45) (91/55 - 143/106)  RR: 26 (15 Zay 2021 00:45) (24 - 38)  SpO2: 100% (15 Zay 2021 00:45) (100% - 100%)      ABG - ( 15 Zay 2021 00:07 )  pH, Arterial: 7.42  pH, Blood: x     /  pCO2: 34    /  pO2: 166   / HCO3: 23    / Base Excess: -2.1  /  SaO2: 99                  I&O's Detail        LABS:                        12.3   24.34 )-----------( 471      ( 14 Jun 2021 23:02 )             41.3     06-14    140  |  107  |  40<H>  ----------------------------<  264<H>  5.0   |  22  |  1.70<H>    Ca    9.4      14 Jun 2021 23:02    TPro  8.9<H>  /  Alb  3.5  /  TBili  0.5  /  DBili  x   /  AST  34  /  ALT  28  /  AlkPhos  94  06-14          CAPILLARY BLOOD GLUCOSE        PT/INR - ( 14 Jun 2021 23:02 )   PT: 12.8 sec;   INR: 1.10 ratio         PTT - ( 14 Jun 2021 23:02 )  PTT:37.7 sec    CULTURES:      Physical Examination:    General: Elderly, chronic trach to vent, appears ill     HEENT: Pupils equal, reactive to light.  Symmetric.    PULM: Clear to auscultation bilaterally, no significant sputum production    CVS: Regular rate and rhythm, no murmurs, rubs, or gallops    ABD: Soft, distended, nontender, normoactive bowel sounds, no guarding     EXT: No edema, nontender    SKIN: Warm and well perfused,    NEURO: A&Ox0, contracted, deconditioned     POCUS: A line predominant lung pattern, b lines noted at bases, small right sided pleural effusion, LV systolic function appears preserved with small virtual IVC      RADIOLOGY: CXR with right sided haziness, and trachea slightly pushed to left side      CRITICAL CARE TIME SPENT: 73 minutes assessing presenting problems of acute illness, which pose high probability of life threatening deterioration or end organ damage/dysfunction, as well as medical decision making including initiating plan of care, reviewing data, reviewing radiologic exams, discussing with multidisciplinary team,  discussing goals of care with patient/family, and writing this note.

## 2021-06-15 NOTE — CONSULT NOTE ADULT - ASSESSMENT
74 yo Female ,[facility] resident with hx of  PMH Frontotemporal dementia, CVA, chronic vent dependent respiratory failure, dysphagia s/p PEG, recurrent UTI/VAP presents with septic shock from [facility] facility. She was last admitted at [facility]"" 10/ 2020 with proteus UTI ,sepsis ,hypernatremia and RYAN .Pseudomonas grew in sputum resistant to zosyn last time Admitted for septic workup and evaluation,send blood and urine cx,serial lactate levels,monitor vitals closley,ivfs hydration,monitor urine output and renal profile,iv abx initiated e. On arrival to ER found to be febrile to 102, WBC 20k, lactate 6.7. CXR with haziness of entire right side, urine appears cloudy. Med hx is significant for Advanced dementia ,s/p  Aphasic stroke , Constipation  ,COVID-19   ,CVA (cerebral vascular accident)  ,Dementia of frontal lobe type  ,Diabetes mellitus  ,DM (diabetes mellitus)type  2   ,GERD (gastroesophageal reflux disease)  ,HTN (hypertension)  ,Hypertension  ,Pneumonia  ,Quadriplegia  ,Respiratory failure ,s/p tracheostomy and peg Palliative care consult requested ,to discuss advance directives and complete /update  [facility]  (15 Zay 2021 07:06)      ACUTE RENAL FAILURE:   Serum creatinine is 1.3   There is no progression . No uremic symptoms  pos  evidence of anemia .  Fluid status stable.  Will continue to avoid nephrotoxic drugs.  Patient remains asymptomatic.   Continue current therapy.  hold  diuretic.  hold   ACE inhibitor.  hold   ARB.    Admit for septic workup and ID evaluation,send blood and urine cx,serial lactate levels,monitor vitals closley,ivfs hydration,monitor urine output and renal profile,iv abx as per id cons

## 2021-06-15 NOTE — CONSULT NOTE ADULT - CONSULT REQUESTED DATE/TIME
15-Zay-2021 11:34
15-Zay-2021
15-Zay-2021 00:51
15-Zay-2021 05:43
15-Zay-2021 11:13
15-Zay-2021 05:25
15-Zay-2021 02:12

## 2021-06-15 NOTE — CONSULT NOTE ADULT - SUBJECTIVE AND OBJECTIVE BOX
REASON FOR CONSULT: Sepsis  Chief Complaint    HPI:74F PMH Frontotemporal dementia, CVA, chronic vent dependent respiratory failure, dysphagia s/p PEG, recurrent UTI/VAP presents with septic shock from [facility] facility. She was last at [facility] in 2020 with proteus UTI and VAP. Pseudomonas grew in sputum resistant to zosyn last time. On arrival to ER found to be febrile to 102, WBC 20k, lactate 6.7. CXR with haziness of entire right side, urine appears cloudy. Patient seen and examined at the bedside. She is awake, appears comfortable, on 40% and 5 of PEEP. BP low at 89/55. Got 2L IVF so far.   Case discussed in detail with the ICU PA.    PAST MEDICAL & SURGICAL HISTORY:  Dementia of frontal lobe type  Aphasic stroke  Diabetes mellitus  Respiratory failure  Hypertension  GERD (gastroesophageal reflux disease)  Constipation  Respiratory failure  CVA (cerebral vascular accident)  HTN (hypertension)  DM (diabetes mellitus)  Advanced dementia  COVID-19 virus detected  Quadriplegia  Pneumonia  Hx of appendectomy  Gastrostomy in place  Tracheostomy in place  Tracheostomy tube present  Feeding by G-tube      Allergies  codeine (Hives)      FAMILY HISTORY:  No pertinent family history in first degree relatives      Medications:  meropenem  IVPB      meropenem  IVPB 1000 milliGRAM(s) IV Intermittent once  meropenem  IVPB 1000 milliGRAM(s) IV Intermittent every 12 hours  ALBUTerol    90 MICROgram(s) HFA Inhaler 2 Puff(s) Inhalation every 6 hours  ipratropium 17 MICROgram(s) HFA Inhaler 1 Puff(s) Inhalation every 6 hours  acetaminophen    Suspension .. 650 milliGRAM(s) Oral every 6 hours PRN  aspirin  chewable 81 milliGRAM(s) Oral daily  heparin   Injectable 5000 Unit(s) SubCutaneous every 8 hours  pantoprazole   Suspension 40 milliGRAM(s) Oral before breakfast  dextrose 40% Gel 15 Gram(s) Oral once  dextrose 50% Injectable 25 Gram(s) IV Push once  dextrose 50% Injectable 12.5 Gram(s) IV Push once  dextrose 50% Injectable 25 Gram(s) IV Push once  glucagon  Injectable 1 milliGRAM(s) IntraMuscular once  insulin lispro (ADMELOG) corrective regimen sliding scale   SubCutaneous every 6 hours  dextrose 5%. 1000 milliLiter(s) IV Continuous <Continuous>  dextrose 5%. 1000 milliLiter(s) IV Continuous <Continuous>  chlorhexidine 2% Cloths 1 Application(s) Topical <User Schedule>        Mode: AC/ CMV (Assist Control/ Continuous Mandatory Ventilation)  RR (machine): 14  TV (machine): 400  FiO2: 40  PEEP: 5  ITime: 1  MAP: 8  PIP: 17      Vital Signs Last 24 Hrs  T(C): 36.5 (15 Zay 2021 01:21), Max: 38.8 (2021 22:50)  T(F): 97.7 (15 Zay 2021 01:), Max: 101.8 (2021 22:50)  HR: 64 (15 Zay 2021 01:) (64 - 109)  BP: 176/67 (15 Zay 2021 01:) (91/55 - 176/67)  BP(mean): 96 (15 Zay 2021 01:21) (96 - 96)  RR: 14 (15 Zay 2021 01:) (14 - 38)  SpO2: 100% (15 Zay 2021 01:21) (100% - 100%)    ABG - ( 15 Zay 2021 00:07 )  pH, Arterial: 7.42  pH, Blood: x     /  pCO2: 34    /  pO2: 166   / HCO3: 23    / Base Excess: -2.1  /  SaO2: 99            2021 07:01  -  15 Zay 2021 02:12  --------------------------------------------------------      LABS:                        12.3   24.34 )-----------( 471      ( 2021 23:02 )             41.3     06-14    140  |  107  |  40<H>  ----------------------------<  264<H>  5.0   |  22  |  1.70<H>    Ca    9.4      2021 23:02    TPro  8.9<H>  /  Alb  3.5  /  TBili  0.5  /  DBili  x   /  AST  34  /  ALT  28  /  AlkPhos  94  06-14        PT/INR - ( 2021 23:02 )   PT: 12.8 sec;   INR: 1.10 ratio         PTT - ( 2021 23:02 )  PTT:37.7 sec  Urinalysis Basic - ( 15 Zay 2021 01:24 )    Color: Yellow / Appearance: Turbid / S.010 / pH: x  Gluc: x / Ketone: Trace  / Bili: Negative / Urobili: Negative   Blood: x / Protein: 100 / Nitrite: Negative   Leuk Esterase: Moderate / RBC: 6-10 /HPF / WBC 6-10   Sq Epi: x / Non Sq Epi: Few / Bacteria: Moderate      CULTURES: sent      Physical Examination:  Physical exam as per bedside MD (direct physical examination could not be performed because the visit was provided via Telemedicine):       RADIOLOGY:  CT preliminary- Multilobar pneumonia    IMP-  Urosepsis  pneumonia  chronic respiratory failure with Vent      Plan-  Admit to ICU  Vent support  Cultured  Meropenum, Vancomycin  ID evaluation  Volume  monitor lytes    CRITICAL CARE TIME SPENT: 45    This visit was provided via telemedicine. Patient was located at     [facility]  Provider was located at [facility] [place], [place] for the visit.

## 2021-06-15 NOTE — H&P ADULT - NSHPSOCIALHISTORY_GEN_ALL_CORE
prior to  admission patient resided in [facility] & [facility] ,no etoh or tobacco reported

## 2021-06-15 NOTE — PROGRESS NOTE ADULT - SUBJECTIVE AND OBJECTIVE BOX
Patient is a 75y old  Female who presents with a chief complaint of fever and dyspnea (15 Zay 2021 11:13)    24 hour events: Stable vitals overnight. Remains afebrile in ICU. Urine clear. Peg tube feeds started.     REVIEW OF SYSTEMS  Unable to assess 2/2 mental status     T(F): 97.9 (06-15-21 @ 12:00), Max: 101.8 (21 @ 22:50)  HR: 60 (06-15-21 @ 12:00) (50 - 109)  BP: 111/53 (06-15-21 @ 12:00) (88/48 - 176/67)  RR: 14 (06-15-21 @ 12:00) (14 - 38)  SpO2: 100% (06-15-21 @ 12:00) (95% - 100%)    Mode: AC/ CMV (Assist Control/ Continuous Mandatory Ventilation), RR (machine): 14, TV (machine): 400, FiO2: 25, PEEP: 5    I&O's Summary     @ 07:  -  06-15 @ 07:00  --------------------------------------------------------  IN: 300 mL / OUT: 430 mL / NET: -130 mL    06-15 @ 07:01  -  06-15 @ 13:02  --------------------------------------------------------  IN: 310 mL / OUT: 250 mL / NET: 60 mL      PHYSICAL EXAM  General: Elderly, chronic trach to vent, appears ill   HEENT: Pupils equal, reactive to light. Symmetric.  PULM: Clear to auscultation bilaterally, no significant sputum production  CVS: Regular rate and rhythm, no murmurs, rubs, or gallops  ABD: Soft, nondistended, normoactive bowel sounds, no guarding. Chronic peg in place    EXT: No edema, nontender  SKIN: Warm and dry   NEURO: A&Ox0, contracted, deconditioned    MEDICATIONS  meropenem  IVPB   meropenem  IVPB IV Intermittent    dextrose 40% Gel Oral  dextrose 50% Injectable IV Push  dextrose 50% Injectable IV Push  dextrose 50% Injectable IV Push  glucagon  Injectable IntraMuscular  insulin glargine Injectable (LANTUS) SubCutaneous  insulin lispro (ADMELOG) corrective regimen sliding scale SubCutaneous    ALBUTerol    90 MICROgram(s) HFA Inhaler Inhalation  ipratropium 17 MICROgram(s) HFA Inhaler Inhalation    acetaminophen    Suspension .. Oral PRN    aspirin  chewable Oral  heparin   Injectable SubCutaneous    pantoprazole   Suspension Oral    dextrose 5%. IV Continuous  dextrose 5%. IV Continuous    chlorhexidine 2% Cloths Topical                          9.7    12.57 )-----------( 258      ( 15 Zay 2021 05:22 )             31.2       06-15    147<H>  |  113<H>  |  38<H>  ----------------------------<  123<H>  4.1   |  24  |  1.30    Ca    7.9<L>      15 Zay 2021 05:22  Phos  3.1     -15  Mg     2.5     -15    TPro  5.9<L>  /  Alb  2.5<L>  /  TBili  0.4  /  DBili  x   /  AST  24  /  ALT  18  /  AlkPhos  60  -15    Lactate 2.9           -15 @ 05:22    Lactate 6.7            @ 23:02    CARDIAC MARKERS ( 15 Zay 2021 05:22 )  .029 ng/mL / x     / x     / x     / x        PT/INR - ( 2021 23:02 )   PT: 12.8 sec;   INR: 1.10 ratio       PTT - ( 2021 23:02 )  PTT:37.7 sec  Urinalysis Basic - ( 15 Zay 2021 01:24 )    Color: Yellow / Appearance: Turbid / S.010 / pH: x  Gluc: x / Ketone: Trace  / Bili: Negative / Urobili: Negative   Blood: x / Protein: 100 / Nitrite: Negative   Leuk Esterase: Moderate / RBC: 6-10 /HPF / WBC 6-10   Sq Epi: x / Non Sq Epi: Few / Bacteria: Moderate    Rapid RVP Result: NotDetec ( @ 23:02)    Radiology: N/A  Bedside lung ultrasound: N/A  Bedside ECHO: N/A    CENTRAL LINE: N  REID: Y                        DATE INSERTED: chronic              A-LINE: N    GLOBAL ISSUE/BEST PRACTICE  Analgesia: N  Sedation: N  CAM-ICU: Y  HOB elevation: yes  Stress ulcer prophylaxis: Y  VTE prophylaxis: Y  Glycemic control: Y  Nutrition: Y    CODE STATUS: FULL  GOC discussion: Y

## 2021-06-15 NOTE — CONSULT NOTE ADULT - ASSESSMENT
74 yo Female ,[facility] resident with hx of  PMH Frontotemporal dementia, CVA, chronic vent dependent respiratory failure, dysphagia s/p PEG, recurrent UTI/VAP presents with septic shock from [facility] facility. concern for Sepsis secondary to UTI vs PNA    RECOMMENDATIONS    Discussed with ICU team and based on prior micro and clinical story agree with assessment with concerns for urinary versus airspace and not very straightforward with few wbcs in urine, limited nonpurulent sputum    -continue meropenem  -sputum and urine for micro  -nares MRSA screen  -recs to follow    Thank you for consulting us and involving us in the management of this most interesting and challenging case.  We will follow along in the care of this patient. Please call us at [phone] or text me directly on my cell# at [phone] with any concerns.

## 2021-06-15 NOTE — PROGRESS NOTE ADULT - ATTENDING COMMENTS
75F PMH Frontotemporal dementia, CVA, chronic respiratory failure s/p trach, vent-dependent, bedbound, dysphagia s/p PEG, recurrent UTI/VAP presents with septic shock likely due to recurrent UTI vs. VAP. Also with neutrophilic leukocytosis, RYAN, and lactic acidosis.     1. Neuro: mental status at baseline, remains awake and alert, but unresponsive  2. CV: HD stable, continue aspirin 81 mg daily. Has history of diastolic dysfunction, mild MR  3. Pulm: continue lung protective ventilation, on minimal FiO2 25% and PEEP 5. Check sputum culture. Continue albuterol-ipratropium  4. GI: restart Glucerna tube feeds. Continue PPI for GI ppx. Bowel regimen with senna and miralax  5. Renal: RYAN and lactic acidosis improved. Strict I/O's, trend kidney function and lytes  6. ID: UTI vs. VAP, follow-up blood cultures. Check urine and sputum cultures. Continue Meropenem  7. Endo: DM2, continue insulin coverage scale, start Lantus 20 qhs. Follow-up a1c  8. Heme: DVT ppx with HSQ  9. Skin: no lines, chronic vaughn for urinary retention  10. Dispo: full code, discussed with , poor overall prognosis  CC time spent: 35 min

## 2021-06-15 NOTE — DIETITIAN INITIAL EVALUATION ADULT. - ORAL INTAKE PTA/DIET HISTORY
Per transfer records pt was receiving TF via PEG of Glucerna 1.5 at 50ml/hr for a total volume of 1,000ml/day, provides 1,500 kcal/day, 82.5 gram protein/day. NKFA.

## 2021-06-15 NOTE — ED ADULT NURSE REASSESSMENT NOTE - NS ED NURSE REASSESS COMMENT FT1
Inserted an indwelling vaughn catheter F16 as ordered noted with initial output of cloudy/ pussy like urine at 100ml as initial output.

## 2021-06-15 NOTE — PROGRESS NOTE ADULT - ASSESSMENT
74F PMH Frontotemporal dementia, CVA, chronic vent dependent respiratory failure, dysphagia s/p PEG, recurrent UTI/VAP presents with septic shock from [facility] facility. Source most likely urine, r/o PNA as well.     Neuro  - Baseline dementia from previous notes, unable to follow commands, but awake and alert.     CV  Hypotension  - 2/2 sepsis  - BP soft s/p 4L IVF  - Off Levophed   - Consider starting Midodrine if BPs decrease     Pulm  r/o  - Hx of VAP with Pseudomonal resistant to Zosyn but sensitive to Meropenem   - Tolerating vent settings well thus low concern for active PNA in right lung  - Home dose inhalers   - C/w Meropenem   - F/u Sputum cultures    GI  - Restarted PEG tube feeds today     Renal  RYAN  - Likely 2/2 hypotension in the setting of sepsis  - S/p IVF hydration  - Trend renal indices    ID  Septic shock  - S/p 4L IVF resuscitation   - MRSA negative   - Continue Meropenem  - F/u BCx. Monitor hemodynamics and labs    UTI  - Hx of proteus UTI  - UA borderline positive   - C/w Meropenem  - F/u UCx    Heme  - DVT ppx HSQ    Endo  Type 2 DM  - insulin sliding scale  - Restarted Lantus 20qhs     Dispo: Continues to require ICU level care

## 2021-06-15 NOTE — ED ADULT NURSE REASSESSMENT NOTE - NS ED NURSE REASSESS COMMENT FT1
Goal Outcome Evaluation:  Plan of Care Reviewed With: patient, guardian  Patient Agreement with Plan of Care: agrees  Consent Given to Review Plan with: Grandmother/guardian.  Progress: improving  Outcome Summary: Therapist met with Patient in the office.    Problem: Adult Behavioral Health Plan of Care  Goal: Plan of Care Review  Outcome: Ongoing, Progressing  Flowsheets (Taken 6/15/2021 1359)  Consent Given to Review Plan with: Grandmother/guardian.  Progress: improving  Plan of Care Reviewed With:  • patient  • guardian  Patient Agreement with Plan of Care: agrees  Outcome Summary: Therapist met with Patient in the office.  Goal: Optimized Coping Skills in Response to Life Stressors  Outcome: Ongoing, Progressing  Intervention: Promote Effective Coping Strategies  Flowsheets (Taken 6/15/2021 1359)  Supportive Measures:  • active listening utilized  • decision-making supported  • positive reinforcement provided  • verbalization of feelings encouraged  Goal: Develops/Participates in Therapeutic Harvey to Support Successful Transition  Outcome: Ongoing, Progressing  Intervention: Foster Therapeutic Harvey  Flowsheets (Taken 6/15/2021 1359)  Trust Relationship/Rapport:  • care explained  • questions encouraged  • choices provided  • reassurance provided  • thoughts/feelings acknowledged  • emotional support provided  • empathic listening provided  • questions answered  Intervention: Mutually Develop Transition Plan  Flowsheets (Taken 6/15/2021 1359)  Transition Support:  • community resources reviewed  • follow-up care coordinated  • follow-up care discussed  • crisis management plan promoted  • crisis management plan verbalized    1400:    DATA: Therapist met with Patient individually this date. Patient agreeable to discuss current treatment progress and discharge concerns.     CLINICAL MANUVERING/INTERVENTIONS:  Assisted Patient in processing session content; acknowledged and normalized Patient’s thoughts,  "feelings, and concerns by utilizing a person-centered approach in efforts to build appropriate rapport and a positive therapeutic relationship with open and honest communication. Allowed Patient to ventilate regarding current stressors and triggers for negative emotions and thoughts in a safe nonjudgmental environment with unconditional positive regard, active listening skills, and empathy.     ASSESSMENT: Patient was seen today for a follow up. She reports she is feeling sad today because she didn't get to go home. Patient is fixated on discharge and continues to minimize symptom burden and the severity of the overdose. Patient states she thinks things could improve at home if she would learn to be more open with her grandmother about how she is feeling. She states she tends to keep her feelings inside until she eventually \"exlpodes.\" She did eventually state she realizes she needs help and that her family and friends are very supportive of her. Patient states talking about how she is feeling is hard for her, but she is starting to realize the important of it.     PLAN: Patient will continue stabilization. Patient will continue to receive services offered by Treatment Team.     Patient will follow-up with [place].    Assistance with transportation will not be needed. Family member will provide.          " pt is rigid and stiff upon arrival.

## 2021-06-15 NOTE — CONSULT NOTE ADULT - PROVIDER SPECIALTY LIST ADULT
Critical Care
Nephrology
Palliative Care
Infectious Disease
Cardiology
Pulmonology
TeleCritical Care

## 2021-06-15 NOTE — CONSULT NOTE ADULT - SUBJECTIVE AND OBJECTIVE BOX
Patient is a 75y Female whom presented to the hospital with ckd and manasa     PAST MEDICAL & SURGICAL HISTORY:  Dementia of frontal lobe type    Aphasic stroke    Diabetes mellitus    Respiratory failure    Hypertension    GERD (gastroesophageal reflux disease)    Constipation    Respiratory failure    CVA (cerebral vascular accident)    HTN (hypertension)    DM (diabetes mellitus)    Advanced dementia    COVID-19 virus detected    Quadriplegia    Pneumonia    Type II diabetes mellitus    Hx of appendectomy    Gastrostomy in place    Tracheostomy in place    Tracheostomy tube present    Feeding by G-tube        MEDICATIONS  (STANDING):  ALBUTerol    90 MICROgram(s) HFA Inhaler 2 Puff(s) Inhalation every 6 hours  aspirin  chewable 81 milliGRAM(s) Oral daily  chlorhexidine 2% Cloths 1 Application(s) Topical <User Schedule>  dextrose 40% Gel 15 Gram(s) Oral once  dextrose 5%. 1000 milliLiter(s) (50 mL/Hr) IV Continuous <Continuous>  dextrose 5%. 1000 milliLiter(s) (100 mL/Hr) IV Continuous <Continuous>  dextrose 50% Injectable 25 Gram(s) IV Push once  dextrose 50% Injectable 12.5 Gram(s) IV Push once  dextrose 50% Injectable 25 Gram(s) IV Push once  glucagon  Injectable 1 milliGRAM(s) IntraMuscular once  heparin   Injectable 5000 Unit(s) SubCutaneous every 8 hours  insulin glargine Injectable (LANTUS) 20 Unit(s) SubCutaneous at bedtime  insulin lispro (ADMELOG) corrective regimen sliding scale   SubCutaneous every 6 hours  ipratropium 17 MICROgram(s) HFA Inhaler 1 Puff(s) Inhalation every 6 hours  meropenem  IVPB      meropenem  IVPB 1000 milliGRAM(s) IV Intermittent every 12 hours  pantoprazole   Suspension 40 milliGRAM(s) Oral before breakfast  polyethylene glycol 3350 17 Gram(s) Oral daily  senna 2 Tablet(s) Oral at bedtime      Allergies    codeine (Hives)    Intolerances        SOCIAL HISTORY:  Denies ETOh,Smoking,     FAMILY HISTORY:  No pertinent family history in first degree relatives        REVIEW OF SYSTEMS:    unable to obtained a good review system      VITAL:  T(C): , Max: 38.8 (21 @ 22:50)  T(F): , Max: 101.8 (21 @ 22:50)  HR: 59 (06-15-21 @ 19:00)  BP: 90/44 (06-15-21 @ 19:00)  BP(mean): 63 (06-15-21 @ 19:00)  RR: 15 (06-15-21 @ 19:00)  SpO2: 99% (06-15-21 @ 19:00)  Wt(kg): --    I and O's:     @ 07:01  -  06-15 @ 07:00  --------------------------------------------------------  IN: 300 mL / OUT: 430 mL / NET: -130 mL    06-15 @ 07:01  -  06-15 @ 19:29  --------------------------------------------------------  IN: 840 mL / OUT: 480 mL / NET: 360 mL      Height (cm): 162.6 (06-15 @ 01:21)  Weight (kg): 53 (06-15 @ 01:21)  BMI (kg/m2): 20 (06-15 @ 01:21)  BSA (m2): 1.56 (06-15 @ 01:21)    PHYSICAL EXAM:    Constitutional: NAD  HEENT: conjunctive   clear   Neck:  No JVD  Respiratory: decrease bs b/l , pos trach   Cardiovascular: S1 and S2  Gastrointestinal: BS+, soft, pos peg   Extremities: No peripheral edema          LABS:                        9.7    12.57 )-----------( 258      ( 15 Zay 2021 05:22 )             31.2     06-15    147<H>  |  113<H>  |  38<H>  ----------------------------<  123<H>  4.1   |  24  |  1.30    Ca    7.9<L>      15 Zay 2021 05:22  Phos  3.1     06-15  Mg     2.5     06-15    TPro  5.9<L>  /  Alb  2.5<L>  /  TBili  0.4  /  DBili  x   /  AST  24  /  ALT  18  /  AlkPhos  60  06-15      Urine Studies:  Urinalysis Basic - ( 15 Zay 2021 01:24 )    Color: Yellow / Appearance: Turbid / S.010 / pH: x  Gluc: x / Ketone: Trace  / Bili: Negative / Urobili: Negative   Blood: x / Protein: 100 / Nitrite: Negative   Leuk Esterase: Moderate / RBC: 6-10 /HPF / WBC 6-10   Sq Epi: x / Non Sq Epi: Few / Bacteria: Moderate            RADIOLOGY & ADDITIONAL STUDIES:

## 2021-06-15 NOTE — H&P ADULT - NSICDXPASTMEDICALHX_GEN_ALL_CORE_FT
PAST MEDICAL HISTORY:  Advanced dementia     Aphasic stroke     Constipation     COVID-19 virus detected     CVA (cerebral vascular accident)     Dementia of frontal lobe type     Diabetes mellitus     DM (diabetes mellitus)     GERD (gastroesophageal reflux disease)     HTN (hypertension)     Hypertension     Pneumonia     Quadriplegia     Respiratory failure     Respiratory failure      PAST MEDICAL HISTORY:  Advanced dementia     Aphasic stroke     Constipation     COVID-19 virus detected     CVA (cerebral vascular accident)     Dementia of frontal lobe type     Diabetes mellitus     DM (diabetes mellitus)     GERD (gastroesophageal reflux disease)     HTN (hypertension)     Hypertension     Pneumonia     Quadriplegia     Respiratory failure     Respiratory failure     Type II diabetes mellitus

## 2021-06-15 NOTE — CONSULT NOTE ADULT - SUBJECTIVE AND OBJECTIVE BOX
BREA BECKHAM    [facility] ICU1 12A    Patient is a 75y old  Female who presents with a chief complaint of fever and dyspnea (15 Zay 2021 07:06)       Allergies    codeine (Hives)    Intolerances        HPI:  76 yo Female ,[facility] resident with hx of  PMH Frontotemporal dementia, CVA, chronic vent dependent respiratory failure, dysphagia s/p PEG, recurrent UTI/VAP presents with septic shock from [facility] facility. She was last admitted at [facility] 10/ 2020 with proteus UTI ,sepsis ,hypernatremia and RYAN .Pseudomonas grew in sputum resistant to zosyn last time Admitted for septic workup and evaluation,send blood and urine cx,serial lactate levels,monitor vitals closley,ivfs hydration,monitor urine output and renal profile,iv abx initiated e. On arrival to ER found to be febrile to 102, WBC 20k, lactate 6.7. CXR with haziness of entire right side, urine appears cloudy. Med hx is significant for Advanced dementia ,s/p  Aphasic stroke , Constipation  ,COVID-19   ,CVA (cerebral vascular accident)  ,Dementia of frontal lobe type  ,Diabetes mellitus  ,DM (diabetes mellitus)type  2   ,GERD (gastroesophageal reflux disease)  ,HTN (hypertension)  ,Hypertension  ,Pneumonia  ,Quadriplegia  ,Respiratory failure ,s/p tracheostomy and peg Palliative care consult requested ,to discuss advance directives and complete /update  MOLST  (15 Zay 2021 07:06)      PAST MEDICAL & SURGICAL HISTORY:  Dementia of frontal lobe type    Aphasic stroke    Diabetes mellitus    Respiratory failure    Hypertension    GERD (gastroesophageal reflux disease)    Constipation    Respiratory failure    CVA (cerebral vascular accident)    HTN (hypertension)    DM (diabetes mellitus)    Advanced dementia    COVID-19 virus detected    Quadriplegia    Pneumonia    Type II diabetes mellitus    Hx of appendectomy    Gastrostomy in place    Tracheostomy in place    Tracheostomy tube present    Feeding by G-tube        FAMILY HISTORY:  No pertinent family history in first degree relatives          MEDICATIONS   acetaminophen    Suspension .. 650 milliGRAM(s) Oral every 6 hours PRN  ALBUTerol    90 MICROgram(s) HFA Inhaler 2 Puff(s) Inhalation every 6 hours  aspirin  chewable 81 milliGRAM(s) Oral daily  chlorhexidine 2% Cloths 1 Application(s) Topical <User Schedule>  dextrose 40% Gel 15 Gram(s) Oral once  dextrose 5%. 1000 milliLiter(s) IV Continuous <Continuous>  dextrose 5%. 1000 milliLiter(s) IV Continuous <Continuous>  dextrose 50% Injectable 25 Gram(s) IV Push once  dextrose 50% Injectable 12.5 Gram(s) IV Push once  dextrose 50% Injectable 25 Gram(s) IV Push once  glucagon  Injectable 1 milliGRAM(s) IntraMuscular once  heparin   Injectable 5000 Unit(s) SubCutaneous every 8 hours  insulin lispro (ADMELOG) corrective regimen sliding scale   SubCutaneous every 6 hours  ipratropium 17 MICROgram(s) HFA Inhaler 1 Puff(s) Inhalation every 6 hours  meropenem  IVPB      meropenem  IVPB 1000 milliGRAM(s) IV Intermittent every 12 hours  pantoprazole   Suspension 40 milliGRAM(s) Oral before breakfast      Vital Signs Last 24 Hrs  T(C): 36.8 (15 Zay 2021 08:10), Max: 38.8 (2021 22:50)  T(F): 98.2 (15 Zay 2021 08:10), Max: 101.8 (2021 22:50)  HR: 58 (15 Zay 2021 09:47) (50 - 109)  BP: 102/45 (15 Zay 2021 09:00) (88/48 - 176/67)  BP(mean): 65 (15 Zay 2021 09:00) (59 - 96)  RR: 15 (15 Zay 2021 09:00) (14 - 38)  SpO2: 96% (15 Zay 2021 09:47) (95% - 100%)      21 @ 07:  -  06-15-21 @ 07:00  --------------------------------------------------------  IN: 300 mL / OUT: 430 mL / NET: -130 mL    06-15-21 @ 07:01  -  06-15-21 @ 09:50  --------------------------------------------------------  IN: 0 mL / OUT: 60 mL / NET: -60 mL        Mode: AC/ CMV (Assist Control/ Continuous Mandatory Ventilation), RR (machine): 14, TV (machine): 400, FiO2: 25, PEEP: 5, ITime: 1, MAP: 11, PIP: 31    LABS:                        9.7    12.57 )-----------( 258      ( 15 Zay 2021 05:22 )             31.2     06-15    147<H>  |  113<H>  |  38<H>  ----------------------------<  123<H>  4.1   |  24  |  1.30    Ca    7.9<L>      15 Zay 2021 05:22  Phos  3.1     06-15  Mg     2.5     -15    TPro  5.9<L>  /  Alb  2.5<L>  /  TBili  0.4  /  DBili  x   /  AST  24  /  ALT  18  /  AlkPhos  60  06-15    PT/INR - ( 2021 23:02 )   PT: 12.8 sec;   INR: 1.10 ratio         PTT - ( 2021 23:02 )  PTT:37.7 sec  Urinalysis Basic - ( 15 Zay 2021 01:24 )    Color: Yellow / Appearance: Turbid / S.010 / pH: x  Gluc: x / Ketone: Trace  / Bili: Negative / Urobili: Negative   Blood: x / Protein: 100 / Nitrite: Negative   Leuk Esterase: Moderate / RBC: 6-10 /HPF / WBC 6-10   Sq Epi: x / Non Sq Epi: Few / Bacteria: Moderate        ABG - ( 15 Zay 2021 00:07 )  pH, Arterial: 7.42  pH, Blood: x     /  pCO2: 34    /  pO2: 166   / HCO3: 23    / Base Excess: -2.1  /  SaO2: 99                  WBC:  WBC Count: 12.57 K/uL (06-15 @ 05:22)  WBC Count: 24.34 K/uL ( @ 23:02)      MICROBIOLOGY:  RECENT CULTURES:        CARDIAC MARKERS ( 15 Zay 2021 05:22 )  .029 ng/mL / x     / x     / x     / x            PT/INR - ( 2021 23:02 )   PT: 12.8 sec;   INR: 1.10 ratio         PTT - ( 2021 23:02 )  PTT:37.7 sec    Sodium:  Sodium, Serum: 147 mmol/L (06-15 @ 05:22)  Sodium, Serum: 140 mmol/L ( @ 23:02)      1.30 mg/dL 06-15 @ 05:22  1.70 mg/dL  @ 23:02      Hemoglobin:  Hemoglobin: 9.7 g/dL (06-15 @ 05:22)  Hemoglobin: 12.3 g/dL ( @ 23:02)      Platelets: Platelet Count - Automated: 258 K/uL (06-15 @ 05:22)  Platelet Count - Automated: 471 K/uL ( @ 23:02)      LIVER FUNCTIONS - ( 15 Zay 2021 05:22 )  Alb: 2.5 g/dL / Pro: 5.9 g/dL / ALK PHOS: 60 U/L / ALT: 18 U/L / AST: 24 U/L / GGT: x             Urinalysis Basic - ( 15 Zay 2021 01:24 )    Color: Yellow / Appearance: Turbid / S.010 / pH: x  Gluc: x / Ketone: Trace  / Bili: Negative / Urobili: Negative   Blood: x / Protein: 100 / Nitrite: Negative   Leuk Esterase: Moderate / RBC: 6-10 /HPF / WBC 6-10   Sq Epi: x / Non Sq Epi: Few / Bacteria: Moderate        RADIOLOGY & ADDITIONAL STUDIES:

## 2021-06-15 NOTE — DIETITIAN INITIAL EVALUATION ADULT. - OTHER INFO
As per chart pt is a 75 year old female with a PMH of Frontotemporal dementia, CVA, Type 2 diabetes, chronic vent dependent respiratory failure, dysphagia s/p PEG, recurrent UTI/VAP presents with septic shock from [facility] facility.     Pt seen at bedside, unable to participate in interview. Pt is currently NPO. Pt's admission weight 116.14lbs, weight per transfer record 122lbs. Per previous RD notes pt's weights (5/2020 & 10/2020) 124lbs. Pt appears closer to weight per transfer record and based on this weight, pt's weight has been fairly stable over the past year. No GI distress noted at this time.     Stage 1 right heel and sacrum pressure injuries

## 2021-06-15 NOTE — PROGRESS NOTE ADULT - SUBJECTIVE AND OBJECTIVE BOX
[facility]       HPI:  74 yo Female ,[facility] resident with hx of  PMH Frontotemporal dementia, CVA, chronic vent dependent respiratory failure, dysphagia s/p PEG, recurrent UTI/VAP presents with septic shock from [facility] facility. She was last admitted at [facility] 10/ 2020 with proteus UTI ,sepsis ,hypernatremia and RYAN .Pseudomonas grew in sputum resistant to zosyn last time Admitted for septic workup and evaluation,send blood and urine cx,serial lactate levels,monitor vitals closley,ivfs hydration,monitor urine output and renal profile,iv abx initiated e. On arrival to ER found to be febrile to 102, WBC 20k, lactate 6.7. CXR with haziness of entire right side, urine appears cloudy. Med hx is significant for Advanced dementia ,s/p  Aphasic stroke , Constipation  ,COVID-19   ,CVA (cerebral vascular accident)  ,Dementia of frontal lobe type  ,Diabetes mellitus  ,DM (diabetes mellitus)type  2   ,GERD (gastroesophageal reflux disease)  ,HTN (hypertension)  ,Hypertension  ,Pneumonia  ,Quadriplegia  ,Respiratory failure ,s/p tracheostomy and peg Palliative care consult requested ,to discuss advance directives and complete /update  MOLST  (15 Zay 2021 07:06)        SUBJECTIVE:      ALLERGIES:  Allergies    codeine (Hives)    Intolerances          MEDICATIONS  (STANDING):  ALBUTerol    90 MICROgram(s) HFA Inhaler 2 Puff(s) Inhalation every 6 hours  aspirin  chewable 81 milliGRAM(s) Oral daily  chlorhexidine 2% Cloths 1 Application(s) Topical <User Schedule>  dextrose 40% Gel 15 Gram(s) Oral once  dextrose 5%. 1000 milliLiter(s) (50 mL/Hr) IV Continuous <Continuous>  dextrose 5%. 1000 milliLiter(s) (100 mL/Hr) IV Continuous <Continuous>  dextrose 50% Injectable 25 Gram(s) IV Push once  dextrose 50% Injectable 12.5 Gram(s) IV Push once  dextrose 50% Injectable 25 Gram(s) IV Push once  glucagon  Injectable 1 milliGRAM(s) IntraMuscular once  heparin   Injectable 5000 Unit(s) SubCutaneous every 8 hours  insulin glargine Injectable (LANTUS) 20 Unit(s) SubCutaneous at bedtime  insulin lispro (ADMELOG) corrective regimen sliding scale   SubCutaneous every 6 hours  ipratropium 17 MICROgram(s) HFA Inhaler 1 Puff(s) Inhalation every 6 hours  meropenem  IVPB      meropenem  IVPB 1000 milliGRAM(s) IV Intermittent every 12 hours  pantoprazole   Suspension 40 milliGRAM(s) Oral before breakfast  polyethylene glycol 3350 17 Gram(s) Oral daily  senna 2 Tablet(s) Oral at bedtime    MEDICATIONS  (PRN):  acetaminophen    Suspension .. 650 milliGRAM(s) Oral every 6 hours PRN Temp greater or equal to 38C (100.4F)      REVIEW OF SYSTEMS:  CONSTITUTIONAL: No fever,  RESPIRATORY: No cough, wheezing, shortness of breath  CARDIOVASCULAR: No chest pain, dyspnea, palpitations, dizziness, syncope, paroxysmal nocturnal dyspnea, orthopnea, or arm or leg swelling  GASTROINTESTINAL: No abdominal  or epigastric pain, nausea, vomiting,  diarrhea  NEUROLOGICAL: No headaches,  loss of strength, numbness, or tremors    Vital Signs Last 24 Hrs  T(C): 37.2 (15 Zay 2021 20:37), Max: 38.8 (2021 22:50)  T(F): 99 (15 Zay 2021 20:37), Max: 101.8 (2021 22:50)  HR: 61 (15 Zay 2021 21:00) (50 - 109)  BP: 100/48 (15 Zay 2021 21:00) (88/48 - 176/67)  BP(mean): 69 (15 Zay 2021 21:00) (59 - 96)  RR: 14 (15 Zay 2021 21:00) (14 - 38)  SpO2: 100% (15 Zay 2021 21:00) (95% - 100%)    PHYSICAL EXAM:  HEAD:  Atraumatic, Normocephalic  NECK: Supple and normal thyroid.  No JVD or carotid bruit.   HEART: S1, S2 regular , 1/6 soft ejection systolic murmur in mitral area , no thrill and no gallops .  PULMONARY: Bilateral vesicular breathing , few scattered ronchi and few scattered rales are present .  ABDOMEN: Soft nontender and positive bowl sounds   EXTREMITIES:  No clubbing, cyanosis, or pedal  edema  NEUROLOGICAL: AAOX3 , no focal deficit .    LABS:                        9.7    12.57 )-----------( 258      ( 15 Zay 2021 05:22 )             31.2     06-15    147<H>  |  113<H>  |  38<H>  ----------------------------<  123<H>  4.1   |  24  |  1.30    Ca    7.9<L>      15 Zay 2021 05:22  Phos  3.1     06-15  Mg     2.5     06-15    TPro  5.9<L>  /  Alb  2.5<L>  /  TBili  0.4  /  DBili  x   /  AST  24  /  ALT  18  /  AlkPhos  60  06-15    CARDIAC MARKERS ( 15 Zay 2021 14:40 )  <.015 ng/mL / x     / x     / x     / x      CARDIAC MARKERS ( 15 Zay 2021 05:22 )  .029 ng/mL / x     / x     / x     / x          PT/INR - ( 2021 23:02 )   PT: 12.8 sec;   INR: 1.10 ratio         PTT - ( 2021 23:02 )  PTT:37.7 sec  Urinalysis Basic - ( 15 Zay 2021 01:24 )    Color: Yellow / Appearance: Turbid / S.010 / pH: x  Gluc: x / Ketone: Trace  / Bili: Negative / Urobili: Negative   Blood: x / Protein: 100 / Nitrite: Negative   Leuk Esterase: Moderate / RBC: 6-10 /HPF / WBC 6-10   Sq Epi: x / Non Sq Epi: Few / Bacteria: Moderate      BNP      EKG:  ECHO:  IMAGING:    Assessment/Plan    Will continue to follow during hospital course and give further recommendations as needed . Thanks for your referral . if any questions please contact me at office [phone] [phone])  JOSIE SON MD [place] [address]  [address]  OFFICE : [phone]  CELL : [phone]    CARDIOLOGY F/U :      HPI:  76 yo Female ,[facility] resident with hx of  PMH Frontotemporal dementia, CVA, chronic vent dependent respiratory failure, dysphagia s/p PEG, recurrent UTI/VAP presents with septic shock from [facility] facility. She was last admitted at [facility] 10/ 2020 with proteus UTI ,sepsis ,hypernatremia and RYAN .Pseudomonas grew in sputum resistant to zosyn last time Admitted for septic workup and evaluation,send blood and urine cx,serial lactate levels,monitor vitals closley,ivfs hydration,monitor urine output and renal profile,iv abx initiated e. On arrival to ER found to be febrile to 102, WBC 20k, lactate 6.7. CXR with haziness of entire right side, urine appears cloudy. Med hx is significant for Advanced dementia ,s/p  Aphasic stroke , Constipation  ,COVID-19   ,CVA (cerebral vascular accident)  ,Dementia of frontal lobe type  ,Diabetes mellitus  ,DM (diabetes mellitus)type  2   ,GERD (gastroesophageal reflux disease)  ,HTN (hypertension)  ,Hypertension  ,Pneumonia  ,Quadriplegia  ,Respiratory failure ,s/p tracheostomy and peg Palliative care consult requested ,to discuss advance directives and complete /update  MOL  (15 Zay 2021 07:06)        SUBJECTIVE: Patient intubated .      ALLERGIES:  Allergies    codeine (Hives)    Intolerances          MEDICATIONS  (STANDING):  ALBUTerol    90 MICROgram(s) HFA Inhaler 2 Puff(s) Inhalation every 6 hours  aspirin  chewable 81 milliGRAM(s) Oral daily  chlorhexidine 2% Cloths 1 Application(s) Topical <User Schedule>  dextrose 40% Gel 15 Gram(s) Oral once  dextrose 5%. 1000 milliLiter(s) (50 mL/Hr) IV Continuous <Continuous>  dextrose 5%. 1000 milliLiter(s) (100 mL/Hr) IV Continuous <Continuous>  dextrose 50% Injectable 25 Gram(s) IV Push once  dextrose 50% Injectable 12.5 Gram(s) IV Push once  dextrose 50% Injectable 25 Gram(s) IV Push once  glucagon  Injectable 1 milliGRAM(s) IntraMuscular once  heparin   Injectable 5000 Unit(s) SubCutaneous every 8 hours  insulin glargine Injectable (LANTUS) 20 Unit(s) SubCutaneous at bedtime  insulin lispro (ADMELOG) corrective regimen sliding scale   SubCutaneous every 6 hours  ipratropium 17 MICROgram(s) HFA Inhaler 1 Puff(s) Inhalation every 6 hours  meropenem  IVPB      meropenem  IVPB 1000 milliGRAM(s) IV Intermittent every 12 hours  pantoprazole   Suspension 40 milliGRAM(s) Oral before breakfast  polyethylene glycol 3350 17 Gram(s) Oral daily  senna 2 Tablet(s) Oral at bedtime    MEDICATIONS  (PRN):  acetaminophen    Suspension .. 650 milliGRAM(s) Oral every 6 hours PRN Temp greater or equal to 38C (100.4F)      REVIEW OF SYSTEMS:  CONSTITUTIONAL: No fever,  RESPIRATORY: Intubated .  CARDIOVASCULAR: No chest pain, palpitations, dizziness, syncope, paroxysmal nocturnal dyspnea, or arm or leg swelling and patient intubated .  GASTROINTESTINAL: No abdominal  or epigastric pain, nausea, vomiting,  diarrhea  NEUROLOGICAL: No headaches,  numbness, or tremors  Skin : No itching .  Hematology : No bleeding .  Endocrinology : No heat and cold intolerance .  Psychiatry : Patient is under effect of sedation .  Musculoskeletal : Patient has mild chronic arthritis .    Vital Signs Last 24 Hrs  T(C): 37.2 (15 Zay 2021 20:37), Max: 38.8 (2021 22:50)  T(F): 99 (15 Zay 2021 20:37), Max: 101.8 (2021 22:50)  HR: 61 (15 Zay 2021 21:00) (50 - 109)  BP: 100/48 (15 Zay 2021 21:00) (88/48 - 176/67)  BP(mean): 69 (15 Zay 2021 21:00) (59 - 96)  RR: 14 (15 Zay 2021 21:00) (14 - 38)  SpO2: 100% (15 Zay 2021 21:00) (95% - 100%)    PHYSICAL EXAM:  HEAD:  Atraumatic, Normocephalic  NECK: Supple and normal thyroid.  No JVD or carotid bruit.   HEART: S1, S2 regular , 1/6 soft ejection systolic murmur in mitral area , no thrill and no gallops .  PULMONARY: Bilateral vesicular breathing , few scattered rhonchi and few scattered rales are present .  ABDOMEN: Soft nontender and positive bowl sounds   EXTREMITIES:  No clubbing, cyanosis, or pedal  edema  NEUROLOGICAL: Under effect of sedation .  Skin : No rashes .  Musculoskeletal : Patient has no joint swellings .    LABS:                        9.7    12.57 )-----------( 258      ( 15 Zay 2021 05:22 )             31.2     06-15    147<H>  |  113<H>  |  38<H>  ----------------------------<  123<H>  4.1   |  24  |  1.30    Ca    7.9<L>      15 Zay 2021 05:22  Phos  3.1     06-15  Mg     2.5     06-15    TPro  5.9<L>  /  Alb  2.5<L>  /  TBili  0.4  /  DBili  x   /  AST  24  /  ALT  18  /  AlkPhos  60  06-15    CARDIAC MARKERS ( 15 Zay 2021 14:40 )  <.015 ng/mL / x     / x     / x     / x      CARDIAC MARKERS ( 15 Zay 2021 05:22 )  .029 ng/mL / x     / x     / x     / x          PT/INR - ( 2021 23:02 )   PT: 12.8 sec;   INR: 1.10 ratio         PTT - ( 2021 23:02 )  PTT:37.7 sec  Urinalysis Basic - ( 15 Zay 2021 01:24 )    Color: Yellow / Appearance: Turbid / S.010 / pH: x  Gluc: x / Ketone: Trace  / Bili: Negative / Urobili: Negative   Blood: x / Protein: 100 / Nitrite: Negative   Leuk Esterase: Moderate / RBC: 6-10 /HPF / WBC 6-10   Sq Epi: x / Non Sq Epi: Few / Bacteria: Moderate      Assessment/Plan  Patient has :  1) Respiratory failure .  2) Pneumonia R/O sepsis .  3) Hypotension and septic shock and BP better .  4) H/O CVA   5) Anemia   Plan : 1) I/V antibiotics as per ID 2) Monitor hemoglobin and electrolyte 3) Rest of medications a such 4) Prognosis guarded .  Will continue to follow during hospital course and give further recommendations as needed . Thanks for your referral . if any questions please contact me at office ([phone])
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
## 2021-06-15 NOTE — CONSULT NOTE ADULT - ASSESSMENT
74 yo female hx of aphasic stroke, covid 19, trach/vented, dm BIBEMS from [facility] for respiratory distress, found to have fever here 101.8 74 yo female hx of aphasic stroke, covid 19, trach/vented, dm BIBEMS from [facility] for respiratory distress, found to have fever here 101.8  sepsis eval in progress  on ABX  cx pending  supportive ICU measures  vent support  not a candidate for weaning  spoke with   pt is full code

## 2021-06-16 LAB
ALBUMIN SERPL ELPH-MCNC: 3.1 G/DL — LOW (ref 3.3–5)
ALP SERPL-CCNC: 73 U/L — SIGNIFICANT CHANGE UP (ref 40–120)
ALT FLD-CCNC: 21 U/L — SIGNIFICANT CHANGE UP (ref 12–78)
ANION GAP SERPL CALC-SCNC: 10 MMOL/L — SIGNIFICANT CHANGE UP (ref 5–17)
AST SERPL-CCNC: 26 U/L — SIGNIFICANT CHANGE UP (ref 15–37)
BASOPHILS # BLD AUTO: 0.05 K/UL — SIGNIFICANT CHANGE UP (ref 0–0.2)
BASOPHILS NFR BLD AUTO: 0.4 % — SIGNIFICANT CHANGE UP (ref 0–2)
BILIRUB SERPL-MCNC: 0.4 MG/DL — SIGNIFICANT CHANGE UP (ref 0.2–1.2)
BUN SERPL-MCNC: 29 MG/DL — HIGH (ref 7–23)
CALCIUM SERPL-MCNC: 9.1 MG/DL — SIGNIFICANT CHANGE UP (ref 8.5–10.1)
CHLORIDE SERPL-SCNC: 111 MMOL/L — HIGH (ref 96–108)
CO2 SERPL-SCNC: 24 MMOL/L — SIGNIFICANT CHANGE UP (ref 22–31)
COVID-19 SPIKE DOMAIN AB INTERP: POSITIVE
COVID-19 SPIKE DOMAIN ANTIBODY RESULT: >250 U/ML — HIGH
CREAT SERPL-MCNC: 1.1 MG/DL — SIGNIFICANT CHANGE UP (ref 0.5–1.3)
CULTURE RESULTS: SIGNIFICANT CHANGE UP
EOSINOPHIL # BLD AUTO: 0.15 K/UL — SIGNIFICANT CHANGE UP (ref 0–0.5)
EOSINOPHIL NFR BLD AUTO: 1.3 % — SIGNIFICANT CHANGE UP (ref 0–6)
GLUCOSE SERPL-MCNC: 136 MG/DL — HIGH (ref 70–99)
HCT VFR BLD CALC: 34 % — LOW (ref 34.5–45)
HGB BLD-MCNC: 10.6 G/DL — LOW (ref 11.5–15.5)
IMM GRANULOCYTES NFR BLD AUTO: 1.2 % — SIGNIFICANT CHANGE UP (ref 0–1.5)
LYMPHOCYTES # BLD AUTO: 2.66 K/UL — SIGNIFICANT CHANGE UP (ref 1–3.3)
LYMPHOCYTES # BLD AUTO: 23.6 % — SIGNIFICANT CHANGE UP (ref 13–44)
MAGNESIUM SERPL-MCNC: 2.7 MG/DL — HIGH (ref 1.6–2.6)
MCHC RBC-ENTMCNC: 27.8 PG — SIGNIFICANT CHANGE UP (ref 27–34)
MCHC RBC-ENTMCNC: 31.2 GM/DL — LOW (ref 32–36)
MCV RBC AUTO: 89.2 FL — SIGNIFICANT CHANGE UP (ref 80–100)
MONOCYTES # BLD AUTO: 0.81 K/UL — SIGNIFICANT CHANGE UP (ref 0–0.9)
MONOCYTES NFR BLD AUTO: 7.2 % — SIGNIFICANT CHANGE UP (ref 2–14)
NEUTROPHILS # BLD AUTO: 7.46 K/UL — HIGH (ref 1.8–7.4)
NEUTROPHILS NFR BLD AUTO: 66.3 % — SIGNIFICANT CHANGE UP (ref 43–77)
NRBC # BLD: 0 /100 WBCS — SIGNIFICANT CHANGE UP (ref 0–0)
PHOSPHATE SERPL-MCNC: 2.8 MG/DL — SIGNIFICANT CHANGE UP (ref 2.5–4.5)
PLATELET # BLD AUTO: 275 K/UL — SIGNIFICANT CHANGE UP (ref 150–400)
POTASSIUM SERPL-MCNC: 3.6 MMOL/L — SIGNIFICANT CHANGE UP (ref 3.5–5.3)
POTASSIUM SERPL-SCNC: 3.6 MMOL/L — SIGNIFICANT CHANGE UP (ref 3.5–5.3)
PROT SERPL-MCNC: 7.2 G/DL — SIGNIFICANT CHANGE UP (ref 6–8.3)
RBC # BLD: 3.81 M/UL — SIGNIFICANT CHANGE UP (ref 3.8–5.2)
RBC # FLD: 15.2 % — HIGH (ref 10.3–14.5)
SARS-COV-2 IGG+IGM SERPL QL IA: >250 U/ML — HIGH
SARS-COV-2 IGG+IGM SERPL QL IA: POSITIVE
SODIUM SERPL-SCNC: 145 MMOL/L — SIGNIFICANT CHANGE UP (ref 135–145)
SPECIMEN SOURCE: SIGNIFICANT CHANGE UP
WBC # BLD: 11.26 K/UL — HIGH (ref 3.8–10.5)
WBC # FLD AUTO: 11.26 K/UL — HIGH (ref 3.8–10.5)

## 2021-06-16 PROCEDURE — 99291 CRITICAL CARE FIRST HOUR: CPT

## 2021-06-16 PROCEDURE — 71045 X-RAY EXAM CHEST 1 VIEW: CPT | Mod: 26

## 2021-06-16 RX ORDER — KETOROLAC TROMETHAMINE 30 MG/ML
30 SYRINGE (ML) INJECTION ONCE
Refills: 0 | Status: DISCONTINUED | OUTPATIENT
Start: 2021-06-16 | End: 2021-06-16

## 2021-06-16 RX ORDER — HYDROMORPHONE HYDROCHLORIDE 2 MG/ML
1 INJECTION INTRAMUSCULAR; INTRAVENOUS; SUBCUTANEOUS ONCE
Refills: 0 | Status: DISCONTINUED | OUTPATIENT
Start: 2021-06-16 | End: 2021-06-16

## 2021-06-16 RX ORDER — ACETAMINOPHEN 500 MG
1000 TABLET ORAL ONCE
Refills: 0 | Status: COMPLETED | OUTPATIENT
Start: 2021-06-16 | End: 2021-06-16

## 2021-06-16 RX ORDER — FENTANYL CITRATE 50 UG/ML
1 INJECTION INTRAVENOUS
Refills: 0 | Status: DISCONTINUED | OUTPATIENT
Start: 2021-06-16 | End: 2021-06-22

## 2021-06-16 RX ORDER — FENTANYL CITRATE 50 UG/ML
12 INJECTION INTRAVENOUS ONCE
Refills: 0 | Status: DISCONTINUED | OUTPATIENT
Start: 2021-06-16 | End: 2021-06-16

## 2021-06-16 RX ORDER — SUCRALFATE 1 G
1 TABLET ORAL EVERY 6 HOURS
Refills: 0 | Status: DISCONTINUED | OUTPATIENT
Start: 2021-06-16 | End: 2021-06-23

## 2021-06-16 RX ADMIN — Medication 1 PUFF(S): at 01:46

## 2021-06-16 RX ADMIN — HEPARIN SODIUM 5000 UNIT(S): 5000 INJECTION INTRAVENOUS; SUBCUTANEOUS at 21:30

## 2021-06-16 RX ADMIN — HEPARIN SODIUM 5000 UNIT(S): 5000 INJECTION INTRAVENOUS; SUBCUTANEOUS at 13:51

## 2021-06-16 RX ADMIN — Medication 1 PUFF(S): at 20:29

## 2021-06-16 RX ADMIN — HYDROMORPHONE HYDROCHLORIDE 1 MILLIGRAM(S): 2 INJECTION INTRAMUSCULAR; INTRAVENOUS; SUBCUTANEOUS at 10:52

## 2021-06-16 RX ADMIN — ALBUTEROL 2 PUFF(S): 90 AEROSOL, METERED ORAL at 08:12

## 2021-06-16 RX ADMIN — MEROPENEM 100 MILLIGRAM(S): 1 INJECTION INTRAVENOUS at 06:05

## 2021-06-16 RX ADMIN — SENNA PLUS 2 TABLET(S): 8.6 TABLET ORAL at 21:30

## 2021-06-16 RX ADMIN — Medication 1 ENEMA: at 11:35

## 2021-06-16 RX ADMIN — Medication 30 MILLIGRAM(S): at 16:00

## 2021-06-16 RX ADMIN — Medication 30 MILLIGRAM(S): at 15:42

## 2021-06-16 RX ADMIN — Medication 1 MILLIGRAM(S): at 16:18

## 2021-06-16 RX ADMIN — Medication 2: at 00:12

## 2021-06-16 RX ADMIN — CHLORHEXIDINE GLUCONATE 1 APPLICATION(S): 213 SOLUTION TOPICAL at 06:05

## 2021-06-16 RX ADMIN — Medication 1 PUFF(S): at 08:12

## 2021-06-16 RX ADMIN — Medication 1 GRAM(S): at 23:07

## 2021-06-16 RX ADMIN — FENTANYL CITRATE 1 PATCH: 50 INJECTION INTRAVENOUS at 11:34

## 2021-06-16 RX ADMIN — MEROPENEM 100 MILLIGRAM(S): 1 INJECTION INTRAVENOUS at 17:56

## 2021-06-16 RX ADMIN — Medication 1 PUFF(S): at 13:00

## 2021-06-16 RX ADMIN — ALBUTEROL 2 PUFF(S): 90 AEROSOL, METERED ORAL at 01:46

## 2021-06-16 RX ADMIN — POLYETHYLENE GLYCOL 3350 17 GRAM(S): 17 POWDER, FOR SOLUTION ORAL at 11:35

## 2021-06-16 RX ADMIN — INSULIN GLARGINE 20 UNIT(S): 100 INJECTION, SOLUTION SUBCUTANEOUS at 21:31

## 2021-06-16 RX ADMIN — Medication 650 MILLIGRAM(S): at 16:44

## 2021-06-16 RX ADMIN — HYDROMORPHONE HYDROCHLORIDE 1 MILLIGRAM(S): 2 INJECTION INTRAMUSCULAR; INTRAVENOUS; SUBCUTANEOUS at 09:34

## 2021-06-16 RX ADMIN — ALBUTEROL 2 PUFF(S): 90 AEROSOL, METERED ORAL at 20:29

## 2021-06-16 RX ADMIN — Medication 400 MILLIGRAM(S): at 08:04

## 2021-06-16 RX ADMIN — Medication 1000 MILLIGRAM(S): at 08:29

## 2021-06-16 RX ADMIN — PANTOPRAZOLE SODIUM 40 MILLIGRAM(S): 20 TABLET, DELAYED RELEASE ORAL at 06:05

## 2021-06-16 RX ADMIN — Medication 81 MILLIGRAM(S): at 11:35

## 2021-06-16 RX ADMIN — Medication 1 GRAM(S): at 17:57

## 2021-06-16 RX ADMIN — Medication 5 MILLIGRAM(S): at 21:30

## 2021-06-16 RX ADMIN — FENTANYL CITRATE 50 MICROGRAM(S): 50 INJECTION INTRAVENOUS at 00:12

## 2021-06-16 RX ADMIN — FENTANYL CITRATE 1 PATCH: 50 INJECTION INTRAVENOUS at 19:41

## 2021-06-16 RX ADMIN — Medication 2: at 17:58

## 2021-06-16 RX ADMIN — HYDROMORPHONE HYDROCHLORIDE 1 MILLIGRAM(S): 2 INJECTION INTRAMUSCULAR; INTRAVENOUS; SUBCUTANEOUS at 01:54

## 2021-06-16 RX ADMIN — HYDROMORPHONE HYDROCHLORIDE 1 MILLIGRAM(S): 2 INJECTION INTRAMUSCULAR; INTRAVENOUS; SUBCUTANEOUS at 00:05

## 2021-06-16 RX ADMIN — Medication 650 MILLIGRAM(S): at 17:49

## 2021-06-16 RX ADMIN — Medication 650 MILLIGRAM(S): at 00:13

## 2021-06-16 RX ADMIN — ALBUTEROL 2 PUFF(S): 90 AEROSOL, METERED ORAL at 13:00

## 2021-06-16 RX ADMIN — Medication 4: at 11:48

## 2021-06-16 RX ADMIN — HEPARIN SODIUM 5000 UNIT(S): 5000 INJECTION INTRAVENOUS; SUBCUTANEOUS at 06:05

## 2021-06-16 NOTE — PROGRESS NOTE ADULT - ASSESSMENT
74F PMH Frontotemporal dementia, CVA, chronic vent dependent respiratory failure, dysphagia s/p PEG, recurrent UTI/VAP presents with septic shock from [facility] facility. Source most likely urine, r/o PNA as well.     Neuro  - Baseline dementia from previous notes, unable to follow commands, but awake and alert.   - continue Fentanyl patch    CV  Hypotension likely 2/2 sepsis now resolving  - BP improving with MAP >80  - Off Levophed   - hemodynamically stable    Pulm  r/o PNA  - Hx of VAP with Pseudomonal resistant to Zosyn but sensitive to Meropenem   - Tolerating vent settings well  - Home dose inhalers   - C/w Meropenem   - Sputum cultures shows few gram neg rods and PMNs  - CT chest 6/15 shows Nonspecific patchy airspace opacities in the lower lobes which can be on an infectious basis or possibly related to aspiration. Residual trace left pleural effusion.      GI  - continue PEG tube feeds  - continue bowel regimen senna and miralax, will add Dulcolax to regimen  - fleet enema today    Renal  RYAN imroving  - Likely 2/2 hypotension in the setting of sepsis  - S/p IVF hydration  - UOP 20-40 cc/hr, will do TOV today  - Trend renal indices    ID  Septic shock, h/o proteus mirabilis indol neg in urine and pseudomonas resistant to Zosyn   - S/p 4L IVF resuscitation   - febrile this AM, received IV Tylenol  - MRSA negative   - blood cultures NGTD, urine cx pending  - sputum cx shows few gram neg rods and PMNs so far  - Continue Meropenem  - Monitor hemodynamics and labs    UTI  - Hx of proteus UTI  - UA borderline positive   - C/w Meropenem  - F/u UCx, pending at this time    Heme  - DVT ppx HSQ    Endo  Type 2 DM  - insulin sliding scale  - continue Lantus 20qhs     Dispo: Continues to require ICU level care  Full code

## 2021-06-16 NOTE — PROGRESS NOTE ADULT - ASSESSMENT
74 yo Female ,[facility] resident with hx of  PMH Frontotemporal dementia, CVA, chronic vent dependent respiratory failure, dysphagia s/p PEG, recurrent UTI/VAP presents with septic shock from [facility] facility. She was last admitted at [facility] 10/ 2020 with proteus UTI ,sepsis ,hypernatremia and RYAN .Pseudomonas grew in sputum resistant to zosyn last time Admitted for septic workup and evaluation,send blood and urine cx,serial lactate levels,monitor vitals closley,ivfs hydration,monitor urine output and renal profile,iv abx initiated e. On arrival to ER found to be febrile to 102, WBC 20k, lactate 6.7. CXR with haziness of entire right side, urine appears cloudy. Med hx is significant for Advanced dementia ,s/p  Aphasic stroke , Constipation  ,COVID-19   ,CVA (cerebral vascular accident)  ,Dementia of frontal lobe type  ,Diabetes mellitus  ,DM (diabetes mellitus)type  2   ,GERD (gastroesophageal reflux disease)  ,HTN (hypertension)  ,Hypertension  ,Pneumonia  ,Quadriplegia  ,Respiratory failure ,s/p tracheostomy and peg Palliative care consult requested ,to discuss advance directives and complete /update  MOLST  (15 Zay 2021 07:06)      ACUTE RENAL FAILURE:   Serum creatinine is 1.1   There is no progression . No uremic symptoms  pos  evidence of anemia .  Fluid status stable.  Will continue to avoid nephrotoxic drugs.  pantoprazole   Suspension 40 milliGRAM(s) Oral before breakfast    Admit for septic workup and ID evaluation,send blood and urine cx,serial lactate levels,monitor vitals closley,ivfs hydration,monitor urine output and renal profile  meropenem  IVPB 1000 milliGRAM(s) IV Intermittent every 12 hours

## 2021-06-16 NOTE — PROGRESS NOTE ADULT - SUBJECTIVE AND OBJECTIVE BOX
BREA BECKHAM    [facility] ICU1 12A    Patient is a 75y old  Female who presents with a chief complaint of fever and dyspnea (2021 06:38)       Allergies    codeine (Hives)    Intolerances        HPI:  76 yo Female ,[facility] resident with hx of  PMH Frontotemporal dementia, CVA, chronic vent dependent respiratory failure, dysphagia s/p PEG, recurrent UTI/VAP presents with septic shock from [facility] facility. She was last admitted at [facility] 10/ 2020 with proteus UTI ,sepsis ,hypernatremia and RYAN .Pseudomonas grew in sputum resistant to zosyn last time Admitted for septic workup and evaluation,send blood and urine cx,serial lactate levels,monitor vitals closley,ivfs hydration,monitor urine output and renal profile,iv abx initiated e. On arrival to ER found to be febrile to 102, WBC 20k, lactate 6.7. CXR with haziness of entire right side, urine appears cloudy. Med hx is significant for Advanced dementia ,s/p  Aphasic stroke , Constipation  ,COVID-19   ,CVA (cerebral vascular accident)  ,Dementia of frontal lobe type  ,Diabetes mellitus  ,DM (diabetes mellitus)type  2   ,GERD (gastroesophageal reflux disease)  ,HTN (hypertension)  ,Hypertension  ,Pneumonia  ,Quadriplegia  ,Respiratory failure ,s/p tracheostomy and peg Palliative care consult requested ,to discuss advance directives and complete /update  MOLST  (15 Zay 2021 07:06)      PAST MEDICAL & SURGICAL HISTORY:  Dementia of frontal lobe type    Aphasic stroke    Diabetes mellitus    Respiratory failure    Hypertension    GERD (gastroesophageal reflux disease)    Constipation    Respiratory failure    CVA (cerebral vascular accident)    HTN (hypertension)    DM (diabetes mellitus)    Advanced dementia    COVID-19 virus detected    Quadriplegia    Pneumonia    Type II diabetes mellitus    Hx of appendectomy    Gastrostomy in place    Tracheostomy in place    Tracheostomy tube present    Feeding by G-tube        FAMILY HISTORY:  No pertinent family history in first degree relatives          MEDICATIONS   acetaminophen    Suspension .. 650 milliGRAM(s) Oral every 6 hours PRN  ALBUTerol    90 MICROgram(s) HFA Inhaler 2 Puff(s) Inhalation every 6 hours  aspirin  chewable 81 milliGRAM(s) Oral daily  chlorhexidine 2% Cloths 1 Application(s) Topical <User Schedule>  dextrose 40% Gel 15 Gram(s) Oral once  dextrose 5%. 1000 milliLiter(s) IV Continuous <Continuous>  dextrose 5%. 1000 milliLiter(s) IV Continuous <Continuous>  dextrose 50% Injectable 25 Gram(s) IV Push once  dextrose 50% Injectable 12.5 Gram(s) IV Push once  dextrose 50% Injectable 25 Gram(s) IV Push once  glucagon  Injectable 1 milliGRAM(s) IntraMuscular once  heparin   Injectable 5000 Unit(s) SubCutaneous every 8 hours  insulin glargine Injectable (LANTUS) 20 Unit(s) SubCutaneous at bedtime  insulin lispro (ADMELOG) corrective regimen sliding scale   SubCutaneous every 6 hours  ipratropium 17 MICROgram(s) HFA Inhaler 1 Puff(s) Inhalation every 6 hours  meropenem  IVPB      meropenem  IVPB 1000 milliGRAM(s) IV Intermittent every 12 hours  pantoprazole   Suspension 40 milliGRAM(s) Oral before breakfast  polyethylene glycol 3350 17 Gram(s) Oral daily  senna 2 Tablet(s) Oral at bedtime      Vital Signs Last 24 Hrs  T(C): 36.5 (2021 03:29), Max: 37.2 (15 Zay 2021 20:37)  T(F): 97.7 (2021 03:29), Max: 99 (15 Zay 2021 20:37)  HR: 96 (2021 07:00) (53 - 139)  BP: 158/70 (2021 07:00) (89/41 - 198/81)  BP(mean): 100 (2021 07:00) (59 - 116)  RR: 26 (2021 07:00) (11 - 45)  SpO2: 81% (2021 07:00) (81% - 100%)      06-15-21 @ 07:01  -  21 @ 07:00  --------------------------------------------------------  IN: 1350 mL / OUT: 830 mL / NET: 520 mL        Mode: AC/ CMV (Assist Control/ Continuous Mandatory Ventilation), RR (machine): 14, TV (machine): 400, FiO2: 30, PEEP: 5, ITime: 1, MAP: 10, PIP: 22    LABS:                        10.6   11.26 )-----------( 275      ( 2021 05:32 )             34.0     06-16    145  |  111<H>  |  29<H>  ----------------------------<  136<H>  3.6   |  24  |  1.10    Ca    9.1      2021 05:32  Phos  2.8     06-16  Mg     2.7     06-16    TPro  7.2  /  Alb  3.1<L>  /  TBili  0.4  /  DBili  x   /  AST  26  /  ALT  21  /  AlkPhos  73  06-16    PT/INR - ( 2021 23:02 )   PT: 12.8 sec;   INR: 1.10 ratio         PTT - ( 2021 23:02 )  PTT:37.7 sec  Urinalysis Basic - ( 15 Zay 2021 01:24 )    Color: Yellow / Appearance: Turbid / S.010 / pH: x  Gluc: x / Ketone: Trace  / Bili: Negative / Urobili: Negative   Blood: x / Protein: 100 / Nitrite: Negative   Leuk Esterase: Moderate / RBC: 6-10 /HPF / WBC 6-10   Sq Epi: x / Non Sq Epi: Few / Bacteria: Moderate        ABG - ( 15 Zay 2021 00:07 )  pH, Arterial: 7.42  pH, Blood: x     /  pCO2: 34    /  pO2: 166   / HCO3: 23    / Base Excess: -2.1  /  SaO2: 99                  WBC:  WBC Count: 11.26 K/uL ( @ 05:32)  WBC Count: 12.57 K/uL (06-15 @ 05:22)  WBC Count: 24.34 K/uL ( @ 23:02)      MICROBIOLOGY:  RECENT CULTURES:  06-15 .Sputum Sputum XXXX   Few polymorphonuclear leukocytes per low power field  Few Squamous epithelial cells per low power field  Rare Gram Negative Rods per oil power field XXXX    06-15 .Blood Blood-Peripheral XXXX XXXX   No growth to date.          CARDIAC MARKERS ( 15 Zay 2021 14:40 )  <.015 ng/mL / x     / x     / x     / x      CARDIAC MARKERS ( 15 Zay 2021 05:22 )  .029 ng/mL / x     / x     / x     / x            PT/INR - ( 2021 23:02 )   PT: 12.8 sec;   INR: 1.10 ratio         PTT - ( 2021 23:02 )  PTT:37.7 sec    Sodium:  Sodium, Serum: 145 mmol/L ( @ 05:32)  Sodium, Serum: 147 mmol/L (06-15 @ 05:22)  Sodium, Serum: 140 mmol/L ( @ 23:02)      1.10 mg/dL :32  1.30 mg/dL 06-15 @ 05:22  1.70 mg/dL  @ 23:02      Hemoglobin:  Hemoglobin: 10.6 g/dL ( @ 05:32)  Hemoglobin: 9.7 g/dL (06-15 @ 05:22)  Hemoglobin: 12.3 g/dL ( @ 23:02)      Platelets: Platelet Count - Automated: 275 K/uL ( @ 05:32)  Platelet Count - Automated: 258 K/uL (06-15 @ 05:22)  Platelet Count - Automated: 471 K/uL ( @ 23:02)      LIVER FUNCTIONS - ( 2021 05:32 )  Alb: 3.1 g/dL / Pro: 7.2 g/dL / ALK PHOS: 73 U/L / ALT: 21 U/L / AST: 26 U/L / GGT: x             Urinalysis Basic - ( 15 Zay 2021 01:24 )    Color: Yellow / Appearance: Turbid / S.010 / pH: x  Gluc: x / Ketone: Trace  / Bili: Negative / Urobili: Negative   Blood: x / Protein: 100 / Nitrite: Negative   Leuk Esterase: Moderate / RBC: 6-10 /HPF / WBC 6-10   Sq Epi: x / Non Sq Epi: Few / Bacteria: Moderate        RADIOLOGY & ADDITIONAL STUDIES:

## 2021-06-16 NOTE — PROGRESS NOTE ADULT - SUBJECTIVE AND OBJECTIVE BOX
[facility] P[facility]CR[facility]elio Eldred       HPI:  74 yo Female ,[facility] resident with hx of  PMH Frontotemporal dementia, CVA, chronic vent dependent respiratory failure, dysphagia s/p PEG, recurrent UTI/VAP presents with septic shock from [facility] facility. She was last admitted at [facility] 10/ 2020 with proteus UTI ,sepsis ,hypernatremia and RYAN .Pseudomonas grew in sputum resistant to zosyn last time Admitted for septic workup and evaluation,send blood and urine cx,serial lactate levels,monitor vitals closley,ivfs hydration,monitor urine output and renal profile,iv abx initiated e. On arrival to ER found to be febrile to 102, WBC 20k, lactate 6.7. CXR with haziness of entire right side, urine appears cloudy. Med hx is significant for Advanced dementia ,s/p  Aphasic stroke , Constipation  ,COVID-19   ,CVA (cerebral vascular accident)  ,Dementia of frontal lobe type  ,Diabetes mellitus  ,DM (diabetes mellitus)type  2   ,GERD (gastroesophageal reflux disease)  ,HTN (hypertension)  ,Hypertension  ,Pneumonia  ,Quadriplegia  ,Respiratory failure ,s/p tracheostomy and peg Palliative care consult requested ,to discuss advance directives and complete /update  MOLST  (15 Zay 2021 07:06)        SUBJECTIVE:      ALLERGIES:  Allergies    codeine (Hives)    Intolerances          MEDICATIONS  (STANDING):  ALBUTerol    90 MICROgram(s) HFA Inhaler 2 Puff(s) Inhalation every 6 hours  aspirin  chewable 81 milliGRAM(s) Oral daily  bisacodyl 5 milliGRAM(s) Oral at bedtime  chlorhexidine 2% Cloths 1 Application(s) Topical <User Schedule>  dextrose 40% Gel 15 Gram(s) Oral once  dextrose 5%. 1000 milliLiter(s) (50 mL/Hr) IV Continuous <Continuous>  dextrose 5%. 1000 milliLiter(s) (100 mL/Hr) IV Continuous <Continuous>  dextrose 50% Injectable 25 Gram(s) IV Push once  dextrose 50% Injectable 12.5 Gram(s) IV Push once  dextrose 50% Injectable 25 Gram(s) IV Push once  fentaNYL   Patch  12 MICROgram(s)/Hr 1 Patch Transdermal every 72 hours  glucagon  Injectable 1 milliGRAM(s) IntraMuscular once  heparin   Injectable 5000 Unit(s) SubCutaneous every 8 hours  insulin glargine Injectable (LANTUS) 20 Unit(s) SubCutaneous at bedtime  insulin lispro (ADMELOG) corrective regimen sliding scale   SubCutaneous every 6 hours  ipratropium 17 MICROgram(s) HFA Inhaler 1 Puff(s) Inhalation every 6 hours  meropenem  IVPB      meropenem  IVPB 1000 milliGRAM(s) IV Intermittent every 12 hours  pantoprazole   Suspension 40 milliGRAM(s) Oral before breakfast  polyethylene glycol 3350 17 Gram(s) Oral daily  senna 2 Tablet(s) Oral at bedtime  sucralfate suspension 1 Gram(s) Oral every 6 hours    MEDICATIONS  (PRN):  acetaminophen    Suspension .. 650 milliGRAM(s) Oral every 6 hours PRN Temp greater or equal to 38C (100.4F)      REVIEW OF SYSTEMS:  CONSTITUTIONAL: No fever,  RESPIRATORY: No cough, wheezing, shortness of breath  CARDIOVASCULAR: No chest pain, dyspnea, palpitations, dizziness, syncope, paroxysmal nocturnal dyspnea, orthopnea, or arm or leg swelling  GASTROINTESTINAL: No abdominal  or epigastric pain, nausea, vomiting,  diarrhea  NEUROLOGICAL: No headaches,  loss of strength, numbness, or tremors    Vital Signs Last 24 Hrs  T(C): 36.8 (2021 23:41), Max: 38.9 (2021 10:00)  T(F): 98.2 (2021 23:41), Max: 102 (2021 10:00)  HR: 69 (2021 23:00) (61 - 145)  BP: 166/65 (2021 23:00) (88/43 - 198/81)  BP(mean): 94 (2021 23:00) (62 - 124)  RR: 15 (2021 23:00) (11 - 51)  SpO2: 100% (2021 23:00) (81% - 100%)    PHYSICAL EXAM:  HEAD:  Atraumatic, Normocephalic  NECK: Supple and normal thyroid.  No JVD or carotid bruit.   HEART: S1, S2 regular , 1/6 soft ejection systolic murmur in mitral area , no thrill and no gallops .  PULMONARY: Bilateral vesicular breathing , few scattered ronchi and few scattered rales are present .  ABDOMEN: Soft nontender and positive bowl sounds   EXTREMITIES:  No clubbing, cyanosis, or pedal  edema  NEUROLOGICAL: AAOX3 , no focal deficit .    LABS:                        10.6   11.26 )-----------( 275      ( 2021 05:32 )             34.0     06-16    145  |  111<H>  |  29<H>  ----------------------------<  136<H>  3.6   |  24  |  1.10    Ca    9.1      2021 05:32  Phos  2.8     06-16  Mg     2.7     06-16    TPro  7.2  /  Alb  3.1<L>  /  TBili  0.4  /  DBili  x   /  AST  26  /  ALT  21  /  AlkPhos  73  06-16    CARDIAC MARKERS ( 15 Zay 2021 14:40 )  <.015 ng/mL / x     / x     / x     / x      CARDIAC MARKERS ( 15 Zay 2021 05:22 )  .029 ng/mL / x     / x     / x     / x            Urinalysis Basic - ( 15 Zay 2021 01:24 )    Color: Yellow / Appearance: Turbid / S.010 / pH: x  Gluc: x / Ketone: Trace  / Bili: Negative / Urobili: Negative   Blood: x / Protein: 100 / Nitrite: Negative   Leuk Esterase: Moderate / RBC: 6-10 /HPF / WBC 6-10   Sq Epi: x / Non Sq Epi: Few / Bacteria: Moderate      BNP      EKG:  ECHO:  IMAGING:    Assessment/Plan    Will continue to follow during hospital course and give further recommendations as needed . Thanks for your referral . if any questions please contact me at office ([phone] [phone])  [facility]       HPI:  76 yo Female ,[facility] with hx of  PMH Frontotemporal dementia, CVA, chronic vent dependent respiratory failure, dysphagia s/p PEG, recurrent UTI/VAP presents with septic shock from [facility] facility. She was last admitted at [facility] 10/ 2020 with proteus UTI ,sepsis ,hypernatremia and RYAN .Pseudomonas grew in sputum resistant to zosyn last time Admitted for septic workup and evaluation,send blood and urine cx,serial lactate levels,monitor vitals closley,ivfs hydration,monitor urine output and renal profile,iv abx initiated e. On arrival to ER found to be febrile to 102, WBC 20k, lactate 6.7. CXR with haziness of entire right side, urine appears cloudy. Med hx is significant for Advanced dementia ,s/p  Aphasic stroke , Constipation  ,COVID-19   ,CVA (cerebral vascular accident)  ,Dementia of frontal lobe type  ,Diabetes mellitus  ,DM (diabetes mellitus)type  2   ,GERD (gastroesophageal reflux disease)  ,HTN (hypertension)  ,Hypertension  ,Pneumonia  ,Quadriplegia  ,Respiratory failure ,s/p tracheostomy and peg Palliative care consult requested ,to discuss advance directives and complete /update  MOLST  (15 Zay 2021 07:06)        SUBJECTIVE:      ALLERGIES:  Allergies    codeine (Hives)    Intolerances          MEDICATIONS  (STANDING):  ALBUTerol    90 MICROgram(s) HFA Inhaler 2 Puff(s) Inhalation every 6 hours  aspirin  chewable 81 milliGRAM(s) Oral daily  bisacodyl 5 milliGRAM(s) Oral at bedtime  chlorhexidine 2% Cloths 1 Application(s) Topical <User Schedule>  dextrose 40% Gel 15 Gram(s) Oral once  dextrose 5%. 1000 milliLiter(s) (50 mL/Hr) IV Continuous <Continuous>  dextrose 5%. 1000 milliLiter(s) (100 mL/Hr) IV Continuous <Continuous>  dextrose 50% Injectable 25 Gram(s) IV Push once  dextrose 50% Injectable 12.5 Gram(s) IV Push once  dextrose 50% Injectable 25 Gram(s) IV Push once  fentaNYL   Patch  12 MICROgram(s)/Hr 1 Patch Transdermal every 72 hours  glucagon  Injectable 1 milliGRAM(s) IntraMuscular once  heparin   Injectable 5000 Unit(s) SubCutaneous every 8 hours  insulin glargine Injectable (LANTUS) 20 Unit(s) SubCutaneous at bedtime  insulin lispro (ADMELOG) corrective regimen sliding scale   SubCutaneous every 6 hours  ipratropium 17 MICROgram(s) HFA Inhaler 1 Puff(s) Inhalation every 6 hours  meropenem  IVPB      meropenem  IVPB 1000 milliGRAM(s) IV Intermittent every 12 hours  pantoprazole   Suspension 40 milliGRAM(s) Oral before breakfast  polyethylene glycol 3350 17 Gram(s) Oral daily  senna 2 Tablet(s) Oral at bedtime  sucralfate suspension 1 Gram(s) Oral every 6 hours    MEDICATIONS  (PRN):  acetaminophen    Suspension .. 650 milliGRAM(s) Oral every 6 hours PRN Temp greater or equal to 38C (100.4F)      REVIEW OF SYSTEMS:  CONSTITUTIONAL: No fever,  RESPIRATORY: No cough, wheezing, shortness of breath  CARDIOVASCULAR: No chest pain, dyspnea, palpitations, dizziness, syncope, paroxysmal nocturnal dyspnea, orthopnea, or arm or leg swelling  GASTROINTESTINAL: No abdominal  or epigastric pain, nausea, vomiting,  diarrhea  NEUROLOGICAL: No headaches,  loss of strength, numbness, or tremors    Vital Signs Last 24 Hrs  T(C): 36.8 (2021 23:41), Max: 38.9 (2021 10:00)  T(F): 98.2 (2021 23:41), Max: 102 (2021 10:00)  HR: 69 (2021 23:00) (61 - 145)  BP: 166/65 (2021 23:00) (88/43 - 198/81)  BP(mean): 94 (2021 23:00) (62 - 124)  RR: 15 (2021 23:00) (11 - 51)  SpO2: 100% (2021 23:00) (81% - 100%)    PHYSICAL EXAM:  HEAD:  Atraumatic, Normocephalic  NECK: Supple and normal thyroid.  No JVD or carotid bruit.   HEART: S1, S2 regular , 1/6 soft ejection systolic murmur in mitral area , no thrill and no gallops .  PULMONARY: Bilateral vesicular breathing , few scattered ronchi and few scattered rales are present .  ABDOMEN: Soft nontender and positive bowl sounds   EXTREMITIES:  No clubbing, cyanosis, or pedal  edema  NEUROLOGICAL: AAOX3 , no focal deficit .    LABS:                        10.6   11.26 )-----------( 275      ( 2021 05:32 )             34.0     06-16    145  |  111<H>  |  29<H>  ----------------------------<  136<H>  3.6   |  24  |  1.10    Ca    9.1      2021 05:32  Phos  2.8     06-16  Mg     2.7     06-16    TPro  7.2  /  Alb  3.1<L>  /  TBili  0.4  /  DBili  x   /  AST  26  /  ALT  21  /  AlkPhos  73  06-16    CARDIAC MARKERS ( 15 Zay 2021 14:40 )  <.015 ng/mL / x     / x     / x     / x      CARDIAC MARKERS ( 15 Zay 2021 05:22 )  .029 ng/mL / x     / x     / x     / x            Urinalysis Basic - ( 15 Zay 2021 01:24 )    Color: Yellow / Appearance: Turbid / S.010 / pH: x  Gluc: x / Ketone: Trace  / Bili: Negative / Urobili: Negative   Blood: x / Protein: 100 / Nitrite: Negative   Leuk Esterase: Moderate / RBC: 6-10 /HPF / WBC 6-10   Sq Epi: x / Non Sq Epi: Few / Bacteria: Moderate      BNP      EKG:  ECHO:  IMAGING:    Assessment/Plan  Patient has :  1) Respiratory failure .  2) Pneumonia R/O sepsis .  3) Hypotension and septic shock and BP better .  4) H/O CVA   5) Anemia   Plan : 1) I/V antibiotics as per ID 2) Monitor hemoglobin and electrolyte 3) Rest of medications a such 4) Prognosis guarded .  Will continue to follow during hospital course and give further recommendations as needed . Thanks for your referral . if any questions please contact me at office ([phone])       Will continue to follow during hospital course and give further recommendations as needed . Thanks for your referral . if any questions please contact me at office ([phone] [phone])  JOSIE SON MD [place] [id number]  [address]  OFFICE : [phone]  CELL : [phone]    CARDIOLOGY F/U :        HPI:  74 yo Female ,[facility] resident with hx of  PMH Frontotemporal dementia, CVA, chronic vent dependent respiratory failure, dysphagia s/p PEG, recurrent UTI/VAP presents with septic shock from [facility] facility. She was last admitted at [facility] 10/ 2020 with proteus UTI ,sepsis ,hypernatremia and RYAN .Pseudomonas grew in sputum resistant to zosyn last time Admitted for septic workup and evaluation,send blood and urine cx,serial lactate levels,monitor vitals closley,ivfs hydration,monitor urine output and renal profile,iv abx initiated e. On arrival to ER found to be febrile to 102, WBC 20k, lactate 6.7. CXR with haziness of entire right side, urine appears cloudy. Med hx is significant for Advanced dementia ,s/p  Aphasic stroke , Constipation  ,COVID-19   ,CVA (cerebral vascular accident)  ,Dementia of frontal lobe type  ,Diabetes mellitus  ,DM (diabetes mellitus)type  2   ,GERD (gastroesophageal reflux disease)  ,HTN (hypertension)  ,Hypertension  ,Pneumonia  ,Quadriplegia  ,Respiratory failure ,s/p tracheostomy and peg Palliative care consult requested ,to discuss advance directives and complete /update  MOL  (15 Zay 2021 07:06)        SUBJECTIVE: Patient intubated .      ALLERGIES:  Allergies    codeine (Hives)    Intolerances          MEDICATIONS  (STANDING):  ALBUTerol    90 MICROgram(s) HFA Inhaler 2 Puff(s) Inhalation every 6 hours  aspirin  chewable 81 milliGRAM(s) Oral daily  bisacodyl 5 milliGRAM(s) Oral at bedtime  chlorhexidine 2% Cloths 1 Application(s) Topical <User Schedule>  dextrose 40% Gel 15 Gram(s) Oral once  dextrose 5%. 1000 milliLiter(s) (50 mL/Hr) IV Continuous <Continuous>  dextrose 5%. 1000 milliLiter(s) (100 mL/Hr) IV Continuous <Continuous>  dextrose 50% Injectable 25 Gram(s) IV Push once  dextrose 50% Injectable 12.5 Gram(s) IV Push once  dextrose 50% Injectable 25 Gram(s) IV Push once  fentaNYL   Patch  12 MICROgram(s)/Hr 1 Patch Transdermal every 72 hours  glucagon  Injectable 1 milliGRAM(s) IntraMuscular once  heparin   Injectable 5000 Unit(s) SubCutaneous every 8 hours  insulin glargine Injectable (LANTUS) 20 Unit(s) SubCutaneous at bedtime  insulin lispro (ADMELOG) corrective regimen sliding scale   SubCutaneous every 6 hours  ipratropium 17 MICROgram(s) HFA Inhaler 1 Puff(s) Inhalation every 6 hours  meropenem  IVPB      meropenem  IVPB 1000 milliGRAM(s) IV Intermittent every 12 hours  pantoprazole   Suspension 40 milliGRAM(s) Oral before breakfast  polyethylene glycol 3350 17 Gram(s) Oral daily  senna 2 Tablet(s) Oral at bedtime  sucralfate suspension 1 Gram(s) Oral every 6 hours    MEDICATIONS  (PRN):  acetaminophen    Suspension .. 650 milliGRAM(s) Oral every 6 hours PRN Temp greater or equal to 38C (100.4F)      REVIEW OF SYSTEMS:  CONSTITUTIONAL: No fever,  RESPIRATORY: No intubated .  CARDIOVASCULAR: No chest pain,  or arm or leg swelling and intubated   GASTROINTESTINAL: No abdominal  or epigastric pain, nausea, vomiting,  diarrhea  NEUROLOGICAL: No headaches ,  numbness, or tremors  Skin : No itching   Hematology : No bleeding .  Endocrinology : No heat and cold intolerance .  Psychiatry : Patient is under effect of sedation .  Musculoskeletal : Patient has mild  arthritis .    Vital Signs Last 24 Hrs  T(C): 36.8 (2021 23:41), Max: 38.9 (2021 10:00)  T(F): 98.2 (2021 23:41), Max: 102 (2021 10:00)  HR: 69 (2021 23:00) (61 - 145)  BP: 166/65 (2021 23:00) (88/43 - 198/81)  BP(mean): 94 (2021 23:00) (62 - 124)  RR: 15 (2021 23:00) (11 - 51)  SpO2: 100% (2021 23:00) (81% - 100%)    PHYSICAL EXAM:  HEAD:  Atraumatic, Normocephalic  NECK: Supple and normal thyroid.  No JVD or carotid bruit.   HEART: S1, S2 regular , 1/6 soft ejection systolic murmur in mitral area , no thrill and no gallops .  PULMONARY: Bilateral vesicular breathing , few scattered rhonchi and few scattered rales are present .  ABDOMEN: Soft nontender and positive bowl sounds   EXTREMITIES:  No clubbing, cyanosis, or pedal  edema  NEUROLOGICAL: under effect of sedation .  Skin : No rashes   Musculoskeletal : No joint swellings .    LABS:                        10.6   11.26 )-----------( 275      ( 2021 05:32 )             34.0     06-16    145  |  111<H>  |  29<H>  ----------------------------<  136<H>  3.6   |  24  |  1.10    Ca    9.1      2021 05:32  Phos  2.8     06-16  Mg     2.7     06-16    TPro  7.2  /  Alb  3.1<L>  /  TBili  0.4  /  DBili  x   /  AST  26  /  ALT  21  /  AlkPhos  73  06-16    CARDIAC MARKERS ( 15 Zay 2021 14:40 )  <.015 ng/mL / x     / x     / x     / x      CARDIAC MARKERS ( 15 Zay 2021 05:22 )  .029 ng/mL / x     / x     / x     / x            Urinalysis Basic - ( 15 Zay 2021 01:24 )    Color: Yellow / Appearance: Turbid / S.010 / pH: x  Gluc: x / Ketone: Trace  / Bili: Negative / Urobili: Negative   Blood: x / Protein: 100 / Nitrite: Negative   Leuk Esterase: Moderate / RBC: 6-10 /HPF / WBC 6-10   Sq Epi: x / Non Sq Epi: Few / Bacteria: Moderate    Assessment/Plan  Patient has :  1) Respiratory failure .  2) Pneumonia R/O sepsis .  3) S/P Hypotension and septic shock and BP better now and mild elevated  .  4) H/O CVA   5) Anemia   Plan : 1) I/V antibiotics as per ID 2) Monitor hemoglobin and electrolyte 3) Rest of medications a such 4) Prognosis guarded .  Will continue to follow during hospital course and give further recommendations as needed . Thanks for your referral . if any questions please contact me at office ([phone] [phone])

## 2021-06-16 NOTE — PROGRESS NOTE ADULT - ASSESSMENT
76 yo female hx of aphasic stroke, covid 19, trach/vented, dm BIBEMS from [facility] for respiratory distress, found to have fever here 101.8  sepsis eval in progress  on ABX  cx in progress  supportive ICU measures  vent support  not a candidate for weaning  spoke with   pt is full code

## 2021-06-16 NOTE — PROGRESS NOTE ADULT - ASSESSMENT
74 yo Female ,[facility] resident with hx of  PMH Frontotemporal dementia, CVA, chronic vent dependent respiratory failure, dysphagia s/p PEG, recurrent UTI/VAP presents with septic shock from [facility] facility. concern for Sepsis secondary to UTI vs PNA    RECOMMENDATIONS    Discussed with ICU team and based on prior micro and clinical story agree with assessment with concerns for urinary versus airspace and not very straightforward with few wbcs in urine, limited nonpurulent sputum, noted   prior micro with Pseudomonas aeruginosa -  Piperacillin/Tazobactam: R >64 in sputum,  Proteus mirabilis (Indole negative) in urine    wbc/neutrophils decreased, eos increasing, so encouraging  -continue meropenem  -sputum and urine for micro but of note not compelling for PNA  -nares MRSA screen negative  -recs to follow

## 2021-06-16 NOTE — PROGRESS NOTE ADULT - SUBJECTIVE AND OBJECTIVE BOX
[facility] DIVISION of INFECTIOUS DISEASE  Arturo Olivas MD PhD, Haylee Umanzor MD, Andressa Brantley MD, Billy Valenzuela MD  and providing coverage with Alexa Canas MD and Eusebio Chairez MD  Providing Infectious Disease Consultations at [facility], [facility], [facility]'s    Office# [phone] to schedule follow up appointments  Answering Service for urgent calls or New Consults [phone]  Cell# to text for urgent issues Arturo Olivas [phone]     infectious diseases progress note:    BREA BECKHAM is a 75y y. o. Female patient    Patient nonverbal on vent    Allergies    codeine (Hives)    Intolerances        ANTIBIOTICS/RELEVANT:  antimicrobials  meropenem  IVPB      meropenem  IVPB 1000 milliGRAM(s) IV Intermittent every 12 hours    immunologic:    OTHER:  acetaminophen    Suspension .. 650 milliGRAM(s) Oral every 6 hours PRN  ALBUTerol    90 MICROgram(s) HFA Inhaler 2 Puff(s) Inhalation every 6 hours  aspirin  chewable 81 milliGRAM(s) Oral daily  chlorhexidine 2% Cloths 1 Application(s) Topical <User Schedule>  dextrose 40% Gel 15 Gram(s) Oral once  dextrose 5%. 1000 milliLiter(s) IV Continuous <Continuous>  dextrose 5%. 1000 milliLiter(s) IV Continuous <Continuous>  dextrose 50% Injectable 25 Gram(s) IV Push once  dextrose 50% Injectable 12.5 Gram(s) IV Push once  dextrose 50% Injectable 25 Gram(s) IV Push once  glucagon  Injectable 1 milliGRAM(s) IntraMuscular once  heparin   Injectable 5000 Unit(s) SubCutaneous every 8 hours  insulin glargine Injectable (LANTUS) 20 Unit(s) SubCutaneous at bedtime  insulin lispro (ADMELOG) corrective regimen sliding scale   SubCutaneous every 6 hours  ipratropium 17 MICROgram(s) HFA Inhaler 1 Puff(s) Inhalation every 6 hours  pantoprazole   Suspension 40 milliGRAM(s) Oral before breakfast  polyethylene glycol 3350 17 Gram(s) Oral daily  senna 2 Tablet(s) Oral at bedtime      Objective:  Last 24-Vital Signs Last 24 Hrs  T(C): 37.9 (2021 08:00), Max: 37.9 (2021 08:00)  T(F): 100.2 (2021 08:00), Max: 100.2 (2021 08:00)  HR: 125 (2021 08:14) (53 - 139)  BP: 160/83 (2021 08:00) (90/44 - 198/81)  BP(mean): 112 (2021 08:00) (63 - 116)  RR: 24 (2021 08:00) (11 - 45)  SpO2: 92% (2021 08:14) (81% - 100%)    T(C): 37.9 (21 @ 08:00), Max: 38.8 (21 @ 22:50)  T(F): 100.2 (21 @ 08:00), Max: 101.8 (21 @ 22:50)  T(C): 37.9 (21 @ 08:00), Max: 38.8 (21 @ 22:50)  T(F): 100.2 (21 @ 08:00), Max: 101.8 (21 @ 22:50)  T(C): 37.9 (21 @ 08:00), Max: 38.8 (21 @ 22:50)  T(F): 100.2 (21 @ 08:00), Max: 101.8 (21 @ 22:50)    PHYSICAL EXAM:  Constitutional: Well-developed, well nourished, diaphoretic  Eyes: PERRLA, EOMI  Ear/Nose/Throat: oropharynx normal	  Neck: no JVD, no lymphadenopathy, supple, intubated via trach  Respiratory:, lung fields bilaterally loud upper airway sounds  Cardiovascular: distant  Gastrointestinal: soft, NT, no HSM, BS-normal  Extremities: no clubbing, no cyanosis, edema absent  Neuro: patient alert,    Mode: AC/ CMV, RR (machine): 14, TV (machine): 400, FiO2: 25, PEEP: 5, ITime: 1, MAP: 9, PIP: 19    LABS:                        10.6   11.26 )-----------( 275      ( 2021 05:32 )             34.0       WBC 11.26   @ 05:32  WBC 12.57  15 @ 05:22  WBC 24.34   @ 23:02          145  |  111<H>  |  29<H>  ----------------------------<  136<H>  3.6   |  24  |  1.10    Ca    9.1      2021 05:32  Phos  2.8       Mg     2.7         TPro  7.2  /  Alb  3.1<L>  /  TBili  0.4  /  DBili  x   /  AST  26  /  ALT  21  /  AlkPhos  73        Creatinine, Serum: 1.10 mg/dL (21 @ 05:32)  Creatinine, Serum: 1.30 mg/dL (06-15-21 @ 05:22)  Creatinine, Serum: 1.70 mg/dL (21 @ 23:02)      PT/INR - ( 2021 23:02 )   PT: 12.8 sec;   INR: 1.10 ratio         PTT - ( 2021 23:02 )  PTT:37.7 sec  Urinalysis Basic - ( 15 Zay 2021 01:24 )    Color: Yellow / Appearance: Turbid / S.010 / pH: x  Gluc: x / Ketone: Trace  / Bili: Negative / Urobili: Negative   Blood: x / Protein: 100 / Nitrite: Negative   Leuk Esterase: Moderate / RBC: 6-10 /HPF / WBC 6-10   Sq Epi: x / Non Sq Epi: Few / Bacteria: Moderate            MICROBIOLOGY:    Culture - Sputum . (06.15.21 @ 16:28)    Gram Stain:   Few polymorphonuclear leukocytes per low power field  Few Squamous epithelial cells per low power field  Rare Gram Negative Rods per oil power field    Specimen Source: .Sputum Sputum    PRIOR  Culture - Sputum . (10.31.20 @ 01:25)    -  Piperacillin/Tazobactam: R >64    -  Tobramycin: S <=2    -  Cefepime: R >16    -  Ceftazidime: R >16    -  Ciprofloxacin: I 1    -  Gentamicin: I 8    -  Imipenem: S <=1    -  Levofloxacin: I 4    -  Meropenem: S <=1    Gram Stain:   Few Squamous epithelial cells per low power field  Few polymorphonuclear leukocytes per low power field  Rare Yeast like cells per oil power field  Rare Gram Negative Rods per oil power field    -  Amikacin: S <=16    -  Aztreonam: R >16    Specimen Source: .Sputum Sputum    Culture Results:   Mixed gram negative rods Culture includes  Few Pseudomonas aeruginosa  Normal Respiratory Elvira present    Organism Identification: Pseudomonas aeruginosa    Organism: Pseudomonas aeruginosa    Method Type: PRATIK    Culture - Urine (10.30.20 @ 13:18)    -  Cefazolin: S <=2 ([id number]) For uncomplicated UTI with K. pneumoniae, E. coli, or P. mirablis: PRATIK <=16 is sensitive and PRATIK >=32 is resistant. This also predicts results for oral agents cefaclor, cefdinir, cefpodoxime, cefprozil, cefuroxime axetil, cephalexin and locarbef for uncomplicated UTI. Note that some isolates may be susceptible to these agents while testing resistant to cefazolin.    -  Cefepime: S <=2    -  Ampicillin/Sulbactam: S <=4/2 Enterobacter, Citrobacter, and Serratia may develop resistance during prolonged therapy (3-4 days)    -  Aztreonam: S <=4    -  Amoxicillin/Clavulanic Acid: S <=8/4    -  Ampicillin: S <=8 These ampicillin results predict results for amoxicillin    -  Amikacin: S <=16    -  Trimethoprim/Sulfamethoxazole: S 2/38    -  Tobramycin: S <=2    -  Meropenem: S <=1    -  Nitrofurantoin: R >64 Should not be used to treat pyelonephritis    -  Piperacillin/Tazobactam: S <=8    -  Levofloxacin: S <=0.5    -  Ertapenem: S <=0.5    -  Gentamicin: S <=2    -  Cefoxitin: S <=8    -  Ceftriaxone: S <=1 Enterobacter, Citrobacter, and Serratia may develop resistance during prolonged therapy    -  Ciprofloxacin: S <=0.25    Specimen Source: .Urine Clean Catch (Midstream)    Culture Results:   >100,000 CFU/ml Proteus mirabilis    Organism Identification: Proteus mirabilis    Organism: Proteus mirabilis    Method Type: PRATIK        RADIOLOGY & ADDITIONAL STUDIES:

## 2021-06-16 NOTE — PROGRESS NOTE ADULT - PROBLEM SELECTOR PLAN 1
2/2 to UTI and probable R PNA - Admitted for septic workup and evaluation ,send blood and urine cx,serial lactate levels,monitor vitals closley,ivfs hydration ,monitor urine output and renal profile ,iv abx initiated-on merropenem

## 2021-06-16 NOTE — PROGRESS NOTE ADULT - ASSESSMENT
PARASHARAMI BREA 75 f [facility] P 6/15/2021   DR ILIANA KEMP      REVIEW OF SYMPTOMS      Able to give (reliable) ROS  NO     PHYSICAL EXAM    HEENT Unremarkable  atraumatic   RESP Fair air entry EXP prolonged    Harsh breath sound Resp distres mild   CARDIAC S1 S2 No S3     NO JVD    ABDOMEN SOFT BS PRESENT NOT DISTENDED No hepatosplenomegaly   PEDAL EDEMA present No calf tenderness  NO rash       PARASHARAMI BREA 75 f [facility] P 6/15/2021 ADMISSION PROBLEMS    COVID STATUS   scv2 6/15/2021 (-)     SEPTIC SHOCK poa  resolvd 6/16/2021   VAP poa  SIGMOID COLITIS 6/15/2021 CTAP    COPD   ANEMIA   HYPERNATREMIA resolvd 6/16/2021   RYAN resolvd 6/16/2021     PMH VENT DEPENDENT   PMH TRACH  PMH PEG   PMH UTI VAP   PMH ZOSYN RESISTANT PSEUDOMONAS   PMH DEMENTIA   PMH CVA .    GLOBAL AND BEST PRACTICE ISSUES                     ALLERGY.    CODEINE   WEIGHT.     6/15/2021 53                         BMI               6/15/2021 20.                                         .                          ADVANCED DIRECTIVE.                               HEAD OF BED ELEVATION. Yes  DVT PROPHYLAXIS.   HPSC (6/15/2021)                    SQUIRES PROPHYLAXIS.       PROTONIX 40 (6/15/2021)   APA.      ASA 81 (6/15/2021)                                                               DYSPHAGIA RECOMM.   DIET.    GLUCERNA 1.5 1000 (6/15/2021)   INFECTION PROPHYLAXIS.   CHLORHEXIDINE 2% (6/15/2021)   FREE WATER.      PATIENT DATA                ABG.     6/15/2021 12 40% /400/5/14 742/34/166              OXYGENATION.                   VITALS/IO/VENT/DRIPS.     6/16/2021 100f 120 160/80   6/16/2021 ac 14/400/5/.3     ASSESSMENT/RECOMMENDATIONS.    SEPTIC SHOCK poa   target map 65 (+)     VAP poa  SIGMOID COLITIS 6/15/2021 CTAP    w 6/14-6/15-6/16 w 24-12 - 11.2   ua 6/15/2021 w 6-10 nitr (-) l estr mod   ct cap nc 6/15/2021 mild sigmoid colitis patchy opac lower lobes possibly aspiration   rvp 6/15/2021 rvp (-)  sp sm 6/15 rare gnr   mrsa 6/15 (-)   blod c 6/15 (-)    MEROPENEM 1.2 (6/15/2021)   Complete 8 d course         VENT MANAGEMENT   VENT BUNDLE Target pH 730 (+) PO2 60 (+) po 90-95% Pplat 35 (-)   HOBE DSV DSBT Restrictive fluid strategy   Gas exchange good on 6/15/2021 12 a     COPD   PROV HFA (6/15/2021)   ATRIV (6/15/2021)   under control    RO MI  Tr 6/15/2021 Tr (-)   mi ruled out     ANEMIA   Hb 6/146/15-6/16 Hb 12.3- 9.7-10.2    target hb 7 (+)     HYPERNATREMIA   Na 6/15-6/16/2021 Na 147 -145  monitor    RYAN  cr 6/14-6/15-6/16 Cr 1.7-1.3-1.1   improvd       OVERALL PLAN.  VAP/SIGMOID COLITIS poa BSAB   VENT MANAGMNT   ANEMIA Monitor  RENAL Monitor       TIME SPENT   Over 25 minutes aggregate care time spent on encounter; activities included   direct patient care, counseling and/or coordinating care reviewing notes, lab data/ imaging , discussion with multidisciplinary team/ patient  /family and explaining in detail risks, benefits, alternatives  of the recommendations     CHAPINCITO GUIDRY 75 f [facility] P 6/15/2021   DR ILIANA KEMP

## 2021-06-16 NOTE — PROGRESS NOTE ADULT - SUBJECTIVE AND OBJECTIVE BOX
Patient is a 75y old  Female who presents with a chief complaint of fever and dyspnea (2021 08:49)    24 hour events: Patient was seen and examined at bedside. Had fever 101F with rigors overnight and received IV Tylenol. Pt appears to have posturing during episodes when she is trying to have a BM as per spouse which is normal for her.     REVIEW OF SYSTEMS  Unable to assess 2/2 mental status     T(F): 97.9 (21 @ 12:31), Max: 102 (21 @ 10:00)  HR: 84 (21 @ 13:01) (53 - 145)  BP: 124/58 (21 @ 10:00) (90/44 - 198/81)  RR: 15 (21 @ 10:00) (11 - 51)  SpO2: 97% (21 @ 13:01) (81% - 100%)  Wt(kg): --    Mode: AC/ CMV (Assist Control/ Continuous Mandatory Ventilation), RR (machine): 14, TV (machine): 400, FiO2: 25, PEEP: 5        I&O's Summary    06-15 @ 07:01  -   @ 07:00  --------------------------------------------------------  IN: 1350 mL / OUT: 860 mL / NET: 490 mL     @ 07:01  -   @ 13:10  --------------------------------------------------------  IN: 450 mL / OUT: 100 mL / NET: 350 mL      PHYSICAL EXAM  General: Elderly, chronic trach to vent, appears ill   HEENT: Pupils equal, reactive to light. Symmetric.  PULM: Clear to auscultation bilaterally, no significant sputum production  CVS: Regular rate and rhythm, no murmurs, rubs, or gallops  ABD: Soft, nondistended, normoactive bowel sounds, no guarding. Chronic peg in place    EXT: No edema, nontender  SKIN: Warm and dry   NEURO: A&Ox0, contracted, deconditioned    MEDICATIONS  meropenem  IVPB   meropenem  IVPB IV Intermittent      dextrose 40% Gel Oral  dextrose 50% Injectable IV Push  dextrose 50% Injectable IV Push  dextrose 50% Injectable IV Push  glucagon  Injectable IntraMuscular  insulin glargine Injectable (LANTUS) SubCutaneous  insulin lispro (ADMELOG) corrective regimen sliding scale SubCutaneous    ALBUTerol    90 MICROgram(s) HFA Inhaler Inhalation  ipratropium 17 MICROgram(s) HFA Inhaler Inhalation    acetaminophen    Suspension .. Oral PRN  fentaNYL   Patch  12 MICROgram(s)/Hr Transdermal      aspirin  chewable Oral  heparin   Injectable SubCutaneous    pantoprazole   Suspension Oral  polyethylene glycol 3350 Oral  senna Oral      dextrose 5%. IV Continuous  dextrose 5%. IV Continuous      chlorhexidine 2% Cloths Topical                            10.6   11.26 )-----------( 275      ( 2021 05:32 )             34.0       06-16    145  |  111<H>  |  29<H>  ----------------------------<  136<H>  3.6   |  24  |  1.10    Ca    9.1      2021 05:32  Phos  2.8     06-16  Mg     2.7     06-16    TPro  7.2  /  Alb  3.1<L>  /  TBili  0.4  /  DBili  x   /  AST  26  /  ALT  21  /  AlkPhos  73  06-16      CARDIAC MARKERS ( 15 Zay 2021 14:40 )  <.015 ng/mL / x     / x     / x     / x      CARDIAC MARKERS ( 15 Zay 2021 05:22 )  .029 ng/mL / x     / x     / x     / x          PT/INR - ( 2021 23:02 )   PT: 12.8 sec;   INR: 1.10 ratio         PTT - ( 2021 23:02 )  PTT:37.7 sec  Urinalysis Basic - ( 15 Zay 2021 01:24 )    Color: Yellow / Appearance: Turbid / S.010 / pH: x  Gluc: x / Ketone: Trace  / Bili: Negative / Urobili: Negative   Blood: x / Protein: 100 / Nitrite: Negative   Leuk Esterase: Moderate / RBC: 6-10 /HPF / WBC 6-10   Sq Epi: x / Non Sq Epi: Few / Bacteria: Moderate      .Sputum Sputum --   Few polymorphonuclear leukocytes per low power field  Few Squamous epithelial cells per low power field  Rare Gram Negative Rods per oil power field 06-15 @ 16:28  .Blood Blood-Peripheral   No growth to date. -- 06-15 @ 04:41      Rapid RVP Result: NotDetec ( @ 23:02)    Radiology: N/A  Bedside lung ultrasound: N/A  Bedside ECHO: N/A    CENTRAL LINE: N  REID: Y                        DATE REMOVED:     A-LINE: N    GLOBAL ISSUE/BEST PRACTICE  Analgesia: N  Sedation: N  CAM-ICU: Y  HOB elevation: yes  Stress ulcer prophylaxis: Y  VTE prophylaxis: Y  Glycemic control: Y  Nutrition: Y    CODE STATUS: FULL  GOC discussion: Y

## 2021-06-16 NOTE — PROGRESS NOTE ADULT - SUBJECTIVE AND OBJECTIVE BOX
Date/Time Patient Seen:  		  Referring MD:   Data Reviewed	       Patient is a 75y old  Female who presents with a chief complaint of fever and dyspnea (15 Zay 2021 21:35)      Subjective/HPI     PAST MEDICAL & SURGICAL HISTORY:  Dementia of frontal lobe type    Aphasic stroke    Diabetes mellitus    Respiratory failure    Hypertension    GERD (gastroesophageal reflux disease)    Constipation    Respiratory failure    CVA (cerebral vascular accident)    HTN (hypertension)    DM (diabetes mellitus)    Advanced dementia    COVID-19 virus detected    Quadriplegia    Pneumonia    Type II diabetes mellitus    Hx of appendectomy    Gastrostomy in place    Tracheostomy in place    Tracheostomy tube present    Feeding by G-tube          Medication list         MEDICATIONS  (STANDING):  ALBUTerol    90 MICROgram(s) HFA Inhaler 2 Puff(s) Inhalation every 6 hours  aspirin  chewable 81 milliGRAM(s) Oral daily  chlorhexidine 2% Cloths 1 Application(s) Topical <User Schedule>  dextrose 40% Gel 15 Gram(s) Oral once  dextrose 5%. 1000 milliLiter(s) (50 mL/Hr) IV Continuous <Continuous>  dextrose 5%. 1000 milliLiter(s) (100 mL/Hr) IV Continuous <Continuous>  dextrose 50% Injectable 25 Gram(s) IV Push once  dextrose 50% Injectable 12.5 Gram(s) IV Push once  dextrose 50% Injectable 25 Gram(s) IV Push once  glucagon  Injectable 1 milliGRAM(s) IntraMuscular once  heparin   Injectable 5000 Unit(s) SubCutaneous every 8 hours  insulin glargine Injectable (LANTUS) 20 Unit(s) SubCutaneous at bedtime  insulin lispro (ADMELOG) corrective regimen sliding scale   SubCutaneous every 6 hours  ipratropium 17 MICROgram(s) HFA Inhaler 1 Puff(s) Inhalation every 6 hours  meropenem  IVPB      meropenem  IVPB 1000 milliGRAM(s) IV Intermittent every 12 hours  pantoprazole   Suspension 40 milliGRAM(s) Oral before breakfast  polyethylene glycol 3350 17 Gram(s) Oral daily  senna 2 Tablet(s) Oral at bedtime    MEDICATIONS  (PRN):  acetaminophen    Suspension .. 650 milliGRAM(s) Oral every 6 hours PRN Temp greater or equal to 38C (100.4F)         Vitals log        ICU Vital Signs Last 24 Hrs  T(C): 36.5 (16 Jun 2021 03:29), Max: 37.2 (15 Zay 2021 20:37)  T(F): 97.7 (16 Jun 2021 03:29), Max: 99 (15 Zay 2021 20:37)  HR: 78 (16 Jun 2021 06:00) (53 - 139)  BP: 130/60 (16 Jun 2021 06:00) (88/48 - 198/81)  BP(mean): 86 (16 Jun 2021 06:00) (59 - 116)  ABP: --  ABP(mean): --  RR: 25 (16 Jun 2021 06:00) (11 - 45)  SpO2: 97% (16 Jun 2021 06:00) (92% - 100%)       Mode: AC/ CMV (Assist Control/ Continuous Mandatory Ventilation)  RR (machine): 14  TV (machine): 400  FiO2: 30  PEEP: 5  ITime: 1  MAP: 10  PIP: 22      Input and Output:  I&O's Detail    14 Jun 2021 07:01  -  15 Zay 2021 07:00  --------------------------------------------------------  IN:    Free Water: 250 mL    IV PiggyBack: 50 mL  Total IN: 300 mL    OUT:    Indwelling Catheter - Urethral (mL): 430 mL  Total OUT: 430 mL    Total NET: -130 mL      15 Zay 2021 07:01  -  16 Jun 2021 06:38  --------------------------------------------------------  IN:    Enteral Tube Flush: 80 mL    Free Water: 600 mL    Glucerna 1.5: 470 mL    IV PiggyBack: 50 mL  Total IN: 1200 mL    OUT:    Indwelling Catheter - Urethral (mL): 830 mL  Total OUT: 830 mL    Total NET: 370 mL          Lab Data                        10.6   11.26 )-----------( 275      ( 16 Jun 2021 05:32 )             34.0     06-16    145  |  111<H>  |  29<H>  ----------------------------<  136<H>  3.6   |  24  |  1.10    Ca    9.1      16 Jun 2021 05:32  Phos  2.8     06-16  Mg     2.7     06-16    TPro  7.2  /  Alb  3.1<L>  /  TBili  0.4  /  DBili  x   /  AST  26  /  ALT  21  /  AlkPhos  73  06-16    ABG - ( 15 Zay 2021 00:07 )  pH, Arterial: 7.42  pH, Blood: x     /  pCO2: 34    /  pO2: 166   / HCO3: 23    / Base Excess: -2.1  /  SaO2: 99                CARDIAC MARKERS ( 15 Zay 2021 14:40 )  <.015 ng/mL / x     / x     / x     / x      CARDIAC MARKERS ( 15 Zay 2021 05:22 )  .029 ng/mL / x     / x     / x     / x            Review of Systems	      Objective     Physical Examination    heart s1s2  lung dec BS  abd soft  head nc      Pertinent Lab findings & Imaging      Annalise:  NO   Adequate UO     I&O's Detail    14 Jun 2021 07:01  -  15 Zay 2021 07:00  --------------------------------------------------------  IN:    Free Water: 250 mL    IV PiggyBack: 50 mL  Total IN: 300 mL    OUT:    Indwelling Catheter - Urethral (mL): 430 mL  Total OUT: 430 mL    Total NET: -130 mL      15 Zay 2021 07:01  -  16 Jun 2021 06:38  --------------------------------------------------------  IN:    Enteral Tube Flush: 80 mL    Free Water: 600 mL    Glucerna 1.5: 470 mL    IV PiggyBack: 50 mL  Total IN: 1200 mL    OUT:    Indwelling Catheter - Urethral (mL): 830 mL  Total OUT: 830 mL    Total NET: 370 mL               Discussed with:     Cultures:	        Radiology

## 2021-06-16 NOTE — PROGRESS NOTE ADULT - SUBJECTIVE AND OBJECTIVE BOX
Patient is a 75y Female whom presented to the hospital with ckd and manasa     PAST MEDICAL & SURGICAL HISTORY:  Dementia of frontal lobe type    Aphasic stroke    Diabetes mellitus    Respiratory failure    Hypertension    GERD (gastroesophageal reflux disease)    Constipation    Respiratory failure    CVA (cerebral vascular accident)    HTN (hypertension)    DM (diabetes mellitus)    Advanced dementia    COVID-19 virus detected    Quadriplegia    Pneumonia    Type II diabetes mellitus    Hx of appendectomy    Gastrostomy in place    Tracheostomy in place    Tracheostomy tube present    Feeding by G-tube        MEDICATIONS  (STANDING):  ALBUTerol    90 MICROgram(s) HFA Inhaler 2 Puff(s) Inhalation every 6 hours  aspirin  chewable 81 milliGRAM(s) Oral daily  chlorhexidine 2% Cloths 1 Application(s) Topical <User Schedule>  dextrose 40% Gel 15 Gram(s) Oral once  dextrose 5%. 1000 milliLiter(s) (50 mL/Hr) IV Continuous <Continuous>  dextrose 5%. 1000 milliLiter(s) (100 mL/Hr) IV Continuous <Continuous>  dextrose 50% Injectable 25 Gram(s) IV Push once  dextrose 50% Injectable 12.5 Gram(s) IV Push once  dextrose 50% Injectable 25 Gram(s) IV Push once  glucagon  Injectable 1 milliGRAM(s) IntraMuscular once  heparin   Injectable 5000 Unit(s) SubCutaneous every 8 hours  insulin glargine Injectable (LANTUS) 20 Unit(s) SubCutaneous at bedtime  insulin lispro (ADMELOG) corrective regimen sliding scale   SubCutaneous every 6 hours  ipratropium 17 MICROgram(s) HFA Inhaler 1 Puff(s) Inhalation every 6 hours  meropenem  IVPB      meropenem  IVPB 1000 milliGRAM(s) IV Intermittent every 12 hours  pantoprazole   Suspension 40 milliGRAM(s) Oral before breakfast  polyethylene glycol 3350 17 Gram(s) Oral daily  senna 2 Tablet(s) Oral at bedtime      Allergies    codeine (Hives)    Intolerances        SOCIAL HISTORY:  Denies ETOh,Smoking,     FAMILY HISTORY:  No pertinent family history in first degree relatives        REVIEW OF SYSTEMS:    unable to obtained a good review system                          10.6   11.26 )-----------( 275      ( 2021 05:32 )             34.0       CBC Full  -  ( 2021 05:32 )  WBC Count : 11.26 K/uL  RBC Count : 3.81 M/uL  Hemoglobin : 10.6 g/dL  Hematocrit : 34.0 %  Platelet Count - Automated : 275 K/uL  Mean Cell Volume : 89.2 fl  Mean Cell Hemoglobin : 27.8 pg  Mean Cell Hemoglobin Concentration : 31.2 gm/dL  Auto Neutrophil # : 7.46 K/uL  Auto Lymphocyte # : 2.66 K/uL  Auto Monocyte # : 0.81 K/uL  Auto Eosinophil # : 0.15 K/uL  Auto Basophil # : 0.05 K/uL  Auto Neutrophil % : 66.3 %  Auto Lymphocyte % : 23.6 %  Auto Monocyte % : 7.2 %  Auto Eosinophil % : 1.3 %  Auto Basophil % : 0.4 %      06-16    145  |  111<H>  |  29<H>  ----------------------------<  136<H>  3.6   |  24  |  1.10    Ca    9.1      2021 05:32  Phos  2.8     06-16  Mg     2.7     06-16    TPro  7.2  /  Alb  3.1<L>  /  TBili  0.4  /  DBili  x   /  AST  26  /  ALT  21  /  AlkPhos  73  06-16      CAPILLARY BLOOD GLUCOSE      POCT Blood Glucose.: 172 mg/dL (2021 17:56)  POCT Blood Glucose.: 207 mg/dL (2021 11:46)  POCT Blood Glucose.: 130 mg/dL (2021 05:30)  POCT Blood Glucose.: 157 mg/dL (15 Zay 2021 23:38)  POCT Blood Glucose.: 146 mg/dL (15 Zay 2021 21:45)      Vital Signs Last 24 Hrs  T(C): 36.8 (2021 15:52), Max: 38.9 (2021 10:00)  T(F): 98.2 (2021 15:52), Max: 102 (2021 10:00)  HR: 73 (2021 18:00) (58 - 145)  BP: 88/43 (2021 18:00) (88/43 - 198/81)  BP(mean): 62 (2021 18:00) (62 - 124)  RR: 14 (2021 18:00) (11 - 51)  SpO2: 97% (2021 18:00) (81% - 100%)    Urinalysis Basic - ( 15 Zay 2021 01:24 )    Color: Yellow / Appearance: Turbid / S.010 / pH: x  Gluc: x / Ketone: Trace  / Bili: Negative / Urobili: Negative   Blood: x / Protein: 100 / Nitrite: Negative   Leuk Esterase: Moderate / RBC: 6-10 /HPF / WBC 6-10   Sq Epi: x / Non Sq Epi: Few / Bacteria: Moderate        PT/INR - ( 2021 23:02 )   PT: 12.8 sec;   INR: 1.10 ratio         PTT - ( 2021 23:02 )  PTT:37.7 sec    PHYSICAL EXAM:    Constitutional: NAD  HEENT: conjunctive   clear   Neck:  No JVD  Respiratory: decrease bs b/l , pos trach   Cardiovascular: S1 and S2  Gastrointestinal: BS+, soft, pos peg   Extremities: No peripheral edema

## 2021-06-16 NOTE — PROGRESS NOTE ADULT - SUBJECTIVE AND OBJECTIVE BOX
PROGRESS NOTE  Patient is a 75y old  Female who presents with a chief complaint of fever and dyspnea (2021 13:09)  Chart and available morning labs /imaging are reviewed electronically , urgent issues addressed . More information  is being added upon completion of rounds , when more information is collected and management discussed with consultants , medical staff and social service/case management on the floor   OVERNIGHT  Had fever 101F with rigors overnight and received IV Tylenol. Pt appears to have posturing during episodes when she is trying to have a BM as per spouse which is normal for her. Started on Meropenem as per ID recommendation   HPI:  76 yo Female ,[facility] resident with hx of  PMH Frontotemporal dementia, CVA, chronic vent dependent respiratory failure, dysphagia s/p PEG, recurrent UTI/VAP presents with septic shock from [facility] facility. She was last admitted at [facility] 10/ 2020 with proteus UTI ,sepsis ,hypernatremia and RYAN .Pseudomonas grew in sputum resistant to zosyn last time Admitted for septic workup and evaluation,send blood and urine cx,serial lactate levels,monitor vitals closley,ivfs hydration,monitor urine output and renal profile,iv abx initiated e. On arrival to ER found to be febrile to 102, WBC 20k, lactate 6.7. CXR with haziness of entire right side, urine appears cloudy. Med hx is significant for Advanced dementia ,s/p  Aphasic stroke , Constipation  ,COVID-19   ,CVA (cerebral vascular accident)  ,Dementia of frontal lobe type  ,Diabetes mellitus  ,DM (diabetes mellitus)type  2   ,GERD (gastroesophageal reflux disease)  ,HTN (hypertension)  ,Hypertension  ,Pneumonia  ,Quadriplegia  ,Respiratory failure ,s/p tracheostomy and peg Palliative care consult requested ,to discuss advance directives and complete /update  MOLST  (15 Zay 2021 07:06)  PAST MEDICAL & SURGICAL HISTORY:  Dementia of frontal lobe type  Aphasic stroke    Diabetes mellitus    Respiratory failure    Hypertension    CVA (cerebral vascular accident)    HTN (hypertension)    DM (diabetes mellitus)    COVID-19 virus detected    Quadriplegia    Pneumonia    Type II diabetes mellitus    Respiratory failure    Advanced dementia    GERD (gastroesophageal reflux disease)    Constipation    Hx of appendectomy    Gastrostomy in place    Tracheostomy in place    Tracheostomy tube present    Feeding by G-tube        MEDICATIONS  (STANDING):  ALBUTerol    90 MICROgram(s) HFA Inhaler 2 Puff(s) Inhalation every 6 hours  aspirin  chewable 81 milliGRAM(s) Oral daily  bisacodyl 5 milliGRAM(s) Oral at bedtime  chlorhexidine 2% Cloths 1 Application(s) Topical <User Schedule>  dextrose 40% Gel 15 Gram(s) Oral once  dextrose 5%. 1000 milliLiter(s) (50 mL/Hr) IV Continuous <Continuous>  dextrose 5%. 1000 milliLiter(s) (100 mL/Hr) IV Continuous <Continuous>  dextrose 50% Injectable 25 Gram(s) IV Push once  dextrose 50% Injectable 12.5 Gram(s) IV Push once  dextrose 50% Injectable 25 Gram(s) IV Push once  fentaNYL   Patch  12 MICROgram(s)/Hr 1 Patch Transdermal every 72 hours  glucagon  Injectable 1 milliGRAM(s) IntraMuscular once  heparin   Injectable 5000 Unit(s) SubCutaneous every 8 hours  insulin glargine Injectable (LANTUS) 20 Unit(s) SubCutaneous at bedtime  insulin lispro (ADMELOG) corrective regimen sliding scale   SubCutaneous every 6 hours  ipratropium 17 MICROgram(s) HFA Inhaler 1 Puff(s) Inhalation every 6 hours  meropenem  IVPB      meropenem  IVPB 1000 milliGRAM(s) IV Intermittent every 12 hours  pantoprazole   Suspension 40 milliGRAM(s) Oral before breakfast  polyethylene glycol 3350 17 Gram(s) Oral daily  senna 2 Tablet(s) Oral at bedtime  sucralfate suspension 1 Gram(s) Oral every 6 hours    MEDICATIONS  (PRN):  acetaminophen    Suspension .. 650 milliGRAM(s) Oral every 6 hours PRN Temp greater or equal to 38C (100.4F)      OBJECTIVE    T(C): 37.1 (21 @ 20:42), Max: 38.9 (21 @ 10:00)  HR: 78 (21 @ 21:00) (60 - 145)  BP: 147/58 (21 @ 21:00) (88/43 - 198/81)  RR: 21 (21 @ 21:00) (11 - 51)  SpO2: 99% (21 @ 21:00) (81% - 100%)  Wt(kg): --  I&O's Summary    15 Zay 2021 07:01  -  2021 07:00  --------------------------------------------------------  IN: 1350 mL / OUT: 860 mL / NET: 490 mL    2021 07:01  -  2021 21:05  --------------------------------------------------------  IN: 1250 mL / OUT: 300 mL / NET: 950 mL          REVIEW OF SYSTEMS:  Patient is  unable to provide any information/ROS  due to baseline mental status.     PHYSICAL EXAM: trach  Appearance: NAD. VS past 24 hrs -as above   HEENT:   Moist oral mucosa. Conjunctiva clear b/l.   Neck : supple  Respiratory: Lungs CTAB.  Gastrointestinal:  Soft, nontender. No rebound. No rigidity. BS present	peg  Cardiovascular: RRR ,S1S2 present  Neurologic: Non-focal. Moving all extremities.  Extremities: No edema. No erythema. No calf tenderness.  Skin: No rashes, No ecchymoses, No cyanosis.	  wounds ,skin lesions-See skin assesment flow sheet   LABS:                        10.6    )-----------( 275      ( 2021 05:32 )             34.0     06-16    145  |  111<H>  |  29<H>  ----------------------------<  136<H>  3.6   |  24  |  1.10    Ca    9.1      2021 05:32  Phos  2.8     06-16  Mg     2.7     06-16    TPro  7.2  /  Alb  3.1<L>  /  TBili  0.4  /  DBili  x   /  AST  26  /  ALT  21  /  AlkPhos  73  06-16    CAPILLARY BLOOD GLUCOSE      POCT Blood Glucose.: 172 mg/dL (2021 17:56)  POCT Blood Glucose.: 207 mg/dL (2021 11:46)  POCT Blood Glucose.: 130 mg/dL (2021 05:30)  POCT Blood Glucose.: 157 mg/dL (15 Zay 2021 23:38)  POCT Blood Glucose.: 146 mg/dL (15 Zay 2021 21:45)    PT/INR - ( 2021 23:02 )   PT: 12.8 sec;   INR: 1.10 ratio         PTT - ( 2021 23:02 )  PTT:37.7 sec  Urinalysis Basic - ( 15 Zay 2021 01:24 )    Color: Yellow / Appearance: Turbid / S.010 / pH: x  Gluc: x / Ketone: Trace  / Bili: Negative / Urobili: Negative   Blood: x / Protein: 100 / Nitrite: Negative   Leuk Esterase: Moderate / RBC: 6-10 /HPF / WBC 6-10   Sq Epi: x / Non Sq Epi: Few / Bacteria: Moderate        Culture - Sputum (collected 15 Zay 2021 16:28)  Source: .Sputum Sputum  Gram Stain (15 Zay 2021 18:57):    Few polymorphonuclear leukocytes per low power field    Few Squamous epithelial cells per low power field    Rare Gram Negative Rods per oil power field  Preliminary Report (2021 16:42):    Normal Respiratory Elvira present    Culture - Blood (collected 15 Zay 2021 04:41)  Source: .Blood Blood-Peripheral  Preliminary Report (2021 05:01):    No growth to date.    Culture - Blood (collected 15 Zay 2021 04:41)  Source: .Blood Blood-Peripheral  Preliminary Report (2021 05:01):    No growth to date.      RADIOLOGY & ADDITIONAL TESTS:   reviewed elctronically  ASSESSMENT/PLAN: 	    25minutes spent on this visit, 50% visit time spent in care co-ordination with other attendings and counselling patient  I have discussed care plan with patient and HCP,expressed understanding of problems treatment and their effect and side effects, alternatives in detail,I have asked if they have any questions and concerns and appropriately addressed them to best of my ability

## 2021-06-16 NOTE — PROGRESS NOTE ADULT - ATTENDING COMMENTS
75F PMH Frontotemporal dementia, CVA, chronic respiratory failure s/p trach, vent-dependent, bedbound, dysphagia s/p PEG, recurrent UTI/VAP presents with septic shock likely due to recurrent UTI vs. VAP. Also with neutrophilic leukocytosis, RYAN, and lactic acidosis, now improved.    1. Neuro: mental status at baseline, remains awake and alert, but unresponsive. Today with 2 episodes of tachypnea, facial flushing, tachycardia that preceded a fever. Initially received acetaminophen and hydromorphone, 2nd time received ketorolac, lorazepam, and acetaminophen. Restart fentanyl patch at 12 mcg  2. CV: HD stable, continue aspirin 81 mg daily. Has history of diastolic dysfunction, mild MR  3. Pulm: continue lung protective ventilation, on minimal FiO2 25% and PEEP 5. Sputum gram stain with rare GNR, prelim culture with normal respiratory alejandro. Continue albuterol-ipratropium  4. GI: continue Glucerna tube feeds. On PPI for GI ppx. Restart home sucralfate. Bowel regimen with senna, bisacodyl, and miralax  5. Renal: RYAN and lactic acidosis improved. Strict I/O's, trend kidney function and lytes  6. ID: UTI vs. VAP, follow-up blood, urine, and sputum cultures. Continue Meropenem  7. Endo: DM2, continue insulin coverage scale and Lantus 20 qhs. A1C 8.5  8. Heme: DVT ppx with HSQ  9. Skin: no lines, d/c vaughn and trial of void  10. Dispo: full code, discussed with , poor overall prognosis  CC time spent: 35 min

## 2021-06-17 DIAGNOSIS — N39.0 URINARY TRACT INFECTION, SITE NOT SPECIFIED: ICD-10-CM

## 2021-06-17 DIAGNOSIS — J15.6 PNEUMONIA DUE TO OTHER GRAM-NEGATIVE BACTERIA: ICD-10-CM

## 2021-06-17 LAB
ALBUMIN SERPL ELPH-MCNC: 2.9 G/DL — LOW (ref 3.3–5)
ALP SERPL-CCNC: 82 U/L — SIGNIFICANT CHANGE UP (ref 40–120)
ALT FLD-CCNC: 23 U/L — SIGNIFICANT CHANGE UP (ref 12–78)
ANION GAP SERPL CALC-SCNC: 6 MMOL/L — SIGNIFICANT CHANGE UP (ref 5–17)
AST SERPL-CCNC: 27 U/L — SIGNIFICANT CHANGE UP (ref 15–37)
BASOPHILS # BLD AUTO: 0.04 K/UL — SIGNIFICANT CHANGE UP (ref 0–0.2)
BASOPHILS NFR BLD AUTO: 0.4 % — SIGNIFICANT CHANGE UP (ref 0–2)
BILIRUB SERPL-MCNC: 0.4 MG/DL — SIGNIFICANT CHANGE UP (ref 0.2–1.2)
BUN SERPL-MCNC: 28 MG/DL — HIGH (ref 7–23)
CALCIUM SERPL-MCNC: 8.8 MG/DL — SIGNIFICANT CHANGE UP (ref 8.5–10.1)
CHLORIDE SERPL-SCNC: 114 MMOL/L — HIGH (ref 96–108)
CO2 SERPL-SCNC: 28 MMOL/L — SIGNIFICANT CHANGE UP (ref 22–31)
CREAT SERPL-MCNC: 1 MG/DL — SIGNIFICANT CHANGE UP (ref 0.5–1.3)
CULTURE RESULTS: SIGNIFICANT CHANGE UP
EOSINOPHIL # BLD AUTO: 0.14 K/UL — SIGNIFICANT CHANGE UP (ref 0–0.5)
EOSINOPHIL NFR BLD AUTO: 1.2 % — SIGNIFICANT CHANGE UP (ref 0–6)
GLUCOSE SERPL-MCNC: 126 MG/DL — HIGH (ref 70–99)
HCT VFR BLD CALC: 35 % — SIGNIFICANT CHANGE UP (ref 34.5–45)
HGB BLD-MCNC: 10.8 G/DL — LOW (ref 11.5–15.5)
IMM GRANULOCYTES NFR BLD AUTO: 0.5 % — SIGNIFICANT CHANGE UP (ref 0–1.5)
LYMPHOCYTES # BLD AUTO: 2.39 K/UL — SIGNIFICANT CHANGE UP (ref 1–3.3)
LYMPHOCYTES # BLD AUTO: 21 % — SIGNIFICANT CHANGE UP (ref 13–44)
MAGNESIUM SERPL-MCNC: 2.8 MG/DL — HIGH (ref 1.6–2.6)
MCHC RBC-ENTMCNC: 27.5 PG — SIGNIFICANT CHANGE UP (ref 27–34)
MCHC RBC-ENTMCNC: 30.9 GM/DL — LOW (ref 32–36)
MCV RBC AUTO: 89.1 FL — SIGNIFICANT CHANGE UP (ref 80–100)
MONOCYTES # BLD AUTO: 0.62 K/UL — SIGNIFICANT CHANGE UP (ref 0–0.9)
MONOCYTES NFR BLD AUTO: 5.4 % — SIGNIFICANT CHANGE UP (ref 2–14)
NEUTROPHILS # BLD AUTO: 8.13 K/UL — HIGH (ref 1.8–7.4)
NEUTROPHILS NFR BLD AUTO: 71.5 % — SIGNIFICANT CHANGE UP (ref 43–77)
NRBC # BLD: 0 /100 WBCS — SIGNIFICANT CHANGE UP (ref 0–0)
PHOSPHATE SERPL-MCNC: 3.2 MG/DL — SIGNIFICANT CHANGE UP (ref 2.5–4.5)
PLATELET # BLD AUTO: 267 K/UL — SIGNIFICANT CHANGE UP (ref 150–400)
POTASSIUM SERPL-MCNC: 3.8 MMOL/L — SIGNIFICANT CHANGE UP (ref 3.5–5.3)
POTASSIUM SERPL-SCNC: 3.8 MMOL/L — SIGNIFICANT CHANGE UP (ref 3.5–5.3)
PROT SERPL-MCNC: 7.1 G/DL — SIGNIFICANT CHANGE UP (ref 6–8.3)
RBC # BLD: 3.93 M/UL — SIGNIFICANT CHANGE UP (ref 3.8–5.2)
RBC # FLD: 15.2 % — HIGH (ref 10.3–14.5)
SODIUM SERPL-SCNC: 148 MMOL/L — HIGH (ref 135–145)
SPECIMEN SOURCE: SIGNIFICANT CHANGE UP
WBC # BLD: 11.38 K/UL — HIGH (ref 3.8–10.5)
WBC # FLD AUTO: 11.38 K/UL — HIGH (ref 3.8–10.5)

## 2021-06-17 PROCEDURE — 99291 CRITICAL CARE FIRST HOUR: CPT

## 2021-06-17 RX ORDER — CASPOFUNGIN ACETATE 7 MG/ML
INJECTION, POWDER, LYOPHILIZED, FOR SOLUTION INTRAVENOUS
Refills: 0 | Status: COMPLETED | OUTPATIENT
Start: 2021-06-17 | End: 2021-06-23

## 2021-06-17 RX ORDER — ACETAMINOPHEN 500 MG
1000 TABLET ORAL ONCE
Refills: 0 | Status: COMPLETED | OUTPATIENT
Start: 2021-06-17 | End: 2021-06-17

## 2021-06-17 RX ORDER — CASPOFUNGIN ACETATE 7 MG/ML
50 INJECTION, POWDER, LYOPHILIZED, FOR SOLUTION INTRAVENOUS EVERY 24 HOURS
Refills: 0 | Status: COMPLETED | OUTPATIENT
Start: 2021-06-18 | End: 2021-06-23

## 2021-06-17 RX ORDER — CASPOFUNGIN ACETATE 7 MG/ML
70 INJECTION, POWDER, LYOPHILIZED, FOR SOLUTION INTRAVENOUS ONCE
Refills: 0 | Status: COMPLETED | OUTPATIENT
Start: 2021-06-17 | End: 2021-06-17

## 2021-06-17 RX ORDER — SODIUM CHLORIDE 9 MG/ML
1000 INJECTION, SOLUTION INTRAVENOUS ONCE
Refills: 0 | Status: COMPLETED | OUTPATIENT
Start: 2021-06-17 | End: 2021-06-17

## 2021-06-17 RX ORDER — MEROPENEM 1 G/30ML
1000 INJECTION INTRAVENOUS EVERY 8 HOURS
Refills: 0 | Status: DISCONTINUED | OUTPATIENT
Start: 2021-06-17 | End: 2021-06-23

## 2021-06-17 RX ORDER — SODIUM CHLORIDE 9 MG/ML
500 INJECTION, SOLUTION INTRAVENOUS
Refills: 0 | Status: DISCONTINUED | OUTPATIENT
Start: 2021-06-17 | End: 2021-06-18

## 2021-06-17 RX ADMIN — ALBUTEROL 2 PUFF(S): 90 AEROSOL, METERED ORAL at 20:10

## 2021-06-17 RX ADMIN — CASPOFUNGIN ACETATE 70 MILLIGRAM(S): 7 INJECTION, POWDER, LYOPHILIZED, FOR SOLUTION INTRAVENOUS at 14:55

## 2021-06-17 RX ADMIN — MEROPENEM 100 MILLIGRAM(S): 1 INJECTION INTRAVENOUS at 05:42

## 2021-06-17 RX ADMIN — FENTANYL CITRATE 1 PATCH: 50 INJECTION INTRAVENOUS at 19:15

## 2021-06-17 RX ADMIN — HEPARIN SODIUM 5000 UNIT(S): 5000 INJECTION INTRAVENOUS; SUBCUTANEOUS at 05:42

## 2021-06-17 RX ADMIN — Medication 1 PUFF(S): at 20:10

## 2021-06-17 RX ADMIN — HEPARIN SODIUM 5000 UNIT(S): 5000 INJECTION INTRAVENOUS; SUBCUTANEOUS at 22:51

## 2021-06-17 RX ADMIN — Medication 1 GRAM(S): at 23:08

## 2021-06-17 RX ADMIN — ALBUTEROL 2 PUFF(S): 90 AEROSOL, METERED ORAL at 00:43

## 2021-06-17 RX ADMIN — Medication 1 PUFF(S): at 08:35

## 2021-06-17 RX ADMIN — Medication 1 PUFF(S): at 14:07

## 2021-06-17 RX ADMIN — HEPARIN SODIUM 5000 UNIT(S): 5000 INJECTION INTRAVENOUS; SUBCUTANEOUS at 13:34

## 2021-06-17 RX ADMIN — SODIUM CHLORIDE 999 MILLILITER(S): 9 INJECTION, SOLUTION INTRAVENOUS at 20:22

## 2021-06-17 RX ADMIN — Medication 1 GRAM(S): at 11:51

## 2021-06-17 RX ADMIN — Medication 81 MILLIGRAM(S): at 11:51

## 2021-06-17 RX ADMIN — Medication 1 GRAM(S): at 05:43

## 2021-06-17 RX ADMIN — Medication 1 GRAM(S): at 17:25

## 2021-06-17 RX ADMIN — CHLORHEXIDINE GLUCONATE 1 APPLICATION(S): 213 SOLUTION TOPICAL at 05:42

## 2021-06-17 RX ADMIN — Medication 1 MILLIGRAM(S): at 17:44

## 2021-06-17 RX ADMIN — SENNA PLUS 2 TABLET(S): 8.6 TABLET ORAL at 22:51

## 2021-06-17 RX ADMIN — Medication 1 PUFF(S): at 00:43

## 2021-06-17 RX ADMIN — FENTANYL CITRATE 1 PATCH: 50 INJECTION INTRAVENOUS at 07:50

## 2021-06-17 RX ADMIN — MEROPENEM 100 MILLIGRAM(S): 1 INJECTION INTRAVENOUS at 22:51

## 2021-06-17 RX ADMIN — ALBUTEROL 2 PUFF(S): 90 AEROSOL, METERED ORAL at 14:07

## 2021-06-17 RX ADMIN — PANTOPRAZOLE SODIUM 40 MILLIGRAM(S): 20 TABLET, DELAYED RELEASE ORAL at 05:43

## 2021-06-17 RX ADMIN — POLYETHYLENE GLYCOL 3350 17 GRAM(S): 17 POWDER, FOR SOLUTION ORAL at 11:51

## 2021-06-17 RX ADMIN — Medication 5 MILLIGRAM(S): at 22:51

## 2021-06-17 RX ADMIN — MEROPENEM 100 MILLIGRAM(S): 1 INJECTION INTRAVENOUS at 13:34

## 2021-06-17 RX ADMIN — SODIUM CHLORIDE 1000 MILLILITER(S): 9 INJECTION, SOLUTION INTRAVENOUS at 22:52

## 2021-06-17 RX ADMIN — Medication 400 MILLIGRAM(S): at 17:44

## 2021-06-17 RX ADMIN — ALBUTEROL 2 PUFF(S): 90 AEROSOL, METERED ORAL at 08:35

## 2021-06-17 RX ADMIN — INSULIN GLARGINE 20 UNIT(S): 100 INJECTION, SOLUTION SUBCUTANEOUS at 23:08

## 2021-06-17 NOTE — PHARMACOTHERAPY INTERVENTION NOTE - COMMENTS
Patient ordered for Meropenem 1g Q12h empirically. Renal function improving, GFR = 54 ml/min (Of note, calculated CrCl = 40 ml/min). Discussed risk/benefits for increasing dose based on renal function to Meropenem 1g Q8h with ID Dr Olivas. Per MD, renal function likely to continue improving, will increase to Meropenem 1g Q8h.

## 2021-06-17 NOTE — PROGRESS NOTE ADULT - ASSESSMENT
74 yo Female ,[facility] resident with hx of  PMH Frontotemporal dementia, CVA, chronic vent dependent respiratory failure, dysphagia s/p PEG, recurrent UTI/VAP presents with septic shock from [facility] facility. concern for Sepsis secondary to UTI vs PNA    RECOMMENDATIONS    Discussed with ICU team and based on prior micro and clinical story agree with assessment with concerns for urinary versus airspace and not very straightforward with few wbcs in urine, limited nonpurulent sputum, noted   prior micro with Pseudomonas aeruginosa -  Piperacillin/Tazobactam: R >64 in sputum,  Proteus mirabilis (Indole negative) in urine    wbc/neutrophils decreased, eos increasing, so encouraging  -continue meropenem  -nares MRSA screen negative  6/17-with urine micro recommended start of CASPO    Starting tomorrow Dr Andressa Brantley will be assuming care of this patient so please contact her with any questions of concerns.

## 2021-06-17 NOTE — PROGRESS NOTE ADULT - ASSESSMENT
PARASHARAMI BREA 75 f [facility] P 6/15/2021   DR ILIANA KEMP      REVIEW OF SYMPTOMS      Able to give (reliable) ROS  NO     PHYSICAL EXAM    HEENT Unremarkable  atraumatic   RESP Fair air entry EXP prolonged    Harsh breath sound Resp distres mild   CARDIAC S1 S2 No S3     NO JVD    ABDOMEN SOFT BS PRESENT NOT DISTENDED No hepatosplenomegaly   PEDAL EDEMA present No calf tenderness  NO rash     PARASHARAMI BREA 75 f [facility] P 6/15/2021 ADMISSION PROBLEMS    COVID STATUS   scv2 6/15/2021 (-)   spkab 6/16 (+)     SEPTIC SHOCK poa  resolvd 6/16/2021     VAP poa  SIGMOID COLITIS 6/15/2021 CTAP    CANDIDUIA 6/17/2021   CASPOFUNGIN 6/17/2021     COPD   ANEMIA   HYPERNATREMIA r  FREE WATER 6/17/2021   RYAN resolvd 6/16/2021     GLOBAL AND BEST PRACTICE ISSUES                     ALLERGY.    CODEINE   WEIGHT.     6/15/2021 53                         BMI               6/15/2021 20.                                         .                          ADVANCED DIRECTIVE.                               HEAD OF BED ELEVATION. Yes  DVT PROPHYLAXIS.   HPSC (6/15/2021)                    SQUIRES PROPHYLAXIS.       PROTONIX 40 (6/15/2021)   APA.      ASA 81 (6/15/2021)                                                               DYSPHAGIA RECOMM.   DIET.    GLUCERNA 1.5 1000 (6/15/2021)   INFECTION PROPHYLAXIS.   CHLORHEXIDINE 2% (6/15/2021)   FREE WATER.    250.6 (6/17/2021)     PATIENT DATA                ABG.     6/15/2021 12 40% /400/5/14 742/34/166              OXYGENATION.                   VITALS/IO/VENT/DRIPS.   6/17/2021 64 119/50   6/17/2021 ac 14/400/5/.25       ASSESSMENT/RECOMMENDATIONS.    SEPTIC SHOCK poa   target map 65 (+)     VAP poa  SIGMOID COLITIS 6/15/2021 CTAP    w 6/14-6/15-6/16-5/16 w 24-12 - 11.2-11.3    ua 6/15/2021 w 6-10 nitr (-) l estr mod   ct cap nc 6/15/2021 mild sigmoid colitis patchy opac lower lobes possibly aspiration   rvp 6/15/2021 rvp (-)  sp sm 6/15 rare gnr   mrsa 6/15 (-)   urine 6/15 50-99 c  blod c 6/15 (-)    MEROPENEM 1.2 (6/15/2021)   Complete 8 d course   CAPSO 6/17/2021     VENT MANAGEMENT   VENT BUNDLE Target pH 730 (+) PO2 60 (+) po 90-95% Pplat 35 (-)   HOBE DSV DSBT Restrictive fluid strategy   Gas exchange good on 6/15/2021 12 a     COPD   PROV HFA (6/15/2021)   ATRIV (6/15/2021)   under control    RO MI  Tr 6/15/2021 Tr (-)   mi ruled out     ANEMIA   Hb 6/146/15-6/16 Hb 12.3- 9.7-10.2    target hb 7 (+)     HYPERNATREMIA   Na 6/15-6/16-6/17/2021 Na 147 -145-148   monitor    RYAN  cr 6/14-6/15-6/16 Cr 1.7-1.3-1.1   improvd     OVERALL PLAN.  VAP/SIGMOID COLITIS poa BSAB   CANDIDURIA capsofung 6/17/2021   VENT MANAGMNT   ANEMIA Monitor  RENAL Monitor       TIME SPENT   Over 25 minutes aggregate care time spent on encounter; activities included   direct patient care, counseling and/or coordinating care reviewing notes, lab data/ imaging , discussion with multidisciplinary team/ patient  /family and explaining in detail risks, benefits, alternatives  of the recommendations     CHAPINCITO GUIDRY 75 f [place] P 6/15/2021   DR ILIANA KEMP

## 2021-06-17 NOTE — PROGRESS NOTE ADULT - PROBLEM SELECTOR PLAN 1
2/2 to UTI and probable R PNA - Admitted for septic workup and evaluation ,send blood and urine cx,serial lactate levels,monitor vitals closley,ivfs hydration ,monitor urine output and renal profile ,iv abx initiated-on merropenem .2 sources of invection are suspected -pneumonia vs uti .Off levaphed ,BP improving

## 2021-06-17 NOTE — PROGRESS NOTE ADULT - SUBJECTIVE AND OBJECTIVE BOX
Patient is a 75y old  Female who presents with a chief complaint of fever and dyspnea (17 Jun 2021 11:04)    24 hour events: Rigors yesterday evening. Afebrile since overnight, last temp yesterday 102 @10AM. Vitals stable. Urine cultures growing Candida, Caspo started     REVIEW OF SYSTEMS  Unable to assess 2/2 mental status     T(F): 98 (06-17-21 @ 11:43), Max: 98.8 (06-16-21 @ 20:42)  HR: 63 (06-17-21 @ 12:18) (60 - 115)  BP: 142/63 (06-17-21 @ 10:00) (88/43 - 175/72)  RR: 13 (06-17-21 @ 10:00) (13 - 46)  SpO2: 98% (06-17-21 @ 12:18) (93% - 100%)    Mode: AC/ CMV (Assist Control/ Continuous Mandatory Ventilation), RR (machine): 14, TV (machine): 400, FiO2: 25, PEEP: 5    I&O's Summary    06-16 @ 07:01  -  06-17 @ 07:00  --------------------------------------------------------  IN: 2150 mL / OUT: 650 mL / NET: 1500 mL    06-17 @ 07:01  -  06-17 @ 12:48  --------------------------------------------------------  IN: 400 mL / OUT: 0 mL / NET: 400 mL      PHYSICAL EXAM  General: Elderly, chronic trach to vent, appears ill   HEENT: Pupils equal, reactive to light. Symmetric.  PULM: Clear to auscultation bilaterally, no significant sputum production  CVS: Regular rate and rhythm, no murmurs, rubs, or gallops  ABD: Soft, nondistended, normoactive bowel sounds, no guarding. Chronic peg in place    EXT: No edema, nontender  SKIN: Warm and dry   NEURO: A&Ox0, contracted, deconditioned    MEDICATIONS  meropenem  IVPB IV Intermittent    dextrose 40% Gel Oral  dextrose 50% Injectable IV Push  dextrose 50% Injectable IV Push  dextrose 50% Injectable IV Push  glucagon  Injectable IntraMuscular  insulin glargine Injectable (LANTUS) SubCutaneous  insulin lispro (ADMELOG) corrective regimen sliding scale SubCutaneous    ALBUTerol    90 MICROgram(s) HFA Inhaler Inhalation  ipratropium 17 MICROgram(s) HFA Inhaler Inhalation    acetaminophen    Suspension .. Oral PRN  fentaNYL   Patch  12 MICROgram(s)/Hr Transdermal    aspirin  chewable Oral  heparin   Injectable SubCutaneous    bisacodyl Oral  pantoprazole   Suspension Oral  polyethylene glycol 3350 Oral  senna Oral  sucralfate suspension Oral    dextrose 5%. IV Continuous  dextrose 5%. IV Continuous    chlorhexidine 2% Cloths Topical                        10.8   11.38 )-----------( 267      ( 17 Jun 2021 05:12 )             35.0       06-17    148<H>  |  114<H>  |  28<H>  ----------------------------<  126<H>  3.8   |  28  |  1.00    Ca    8.8      17 Jun 2021 05:12  Phos  3.2     06-17  Mg     2.8     06-17    TPro  7.1  /  Alb  2.9<L>  /  TBili  0.4  /  DBili  x   /  AST  27  /  ALT  23  /  AlkPhos  82  06-17    CARDIAC MARKERS ( 15 Zay 2021 14:40 )  <.015 ng/mL / x     / x     / x     / x        .Urine Catheterized   50,000 - 99,000 CFU/mL Presumptive Candida albicans  "Susceptibilities not performed" -- 06-15 @ 16:42  .Sputum Sputum   Normal Respiratory Elvira present   Few polymorphonuclear leukocytes per low power field  Few Squamous epithelial cells per low power field  Rare Gram Negative Rods per oil power field 06-15 @ 16:28  .Blood Blood-Peripheral   No growth to date. -- 06-15 @ 04:41    Rapid RVP Result: NotDetec (06-14 @ 23:02)    Radiology: N/A  Bedside lung ultrasound: N/A  Bedside ECHO: N/A    CENTRAL LINE: N  REID: N                        DATE REMOVED: 6/16    A-LINE: N    GLOBAL ISSUE/BEST PRACTICE  Analgesia: N  Sedation: N  CAM-ICU: Y  HOB elevation: yes  Stress ulcer prophylaxis: Y  VTE prophylaxis: Y  Glycemic control: Y  Nutrition: Y    CODE STATUS: FULL  GOC discussion: Y

## 2021-06-17 NOTE — PROGRESS NOTE ADULT - ASSESSMENT
76 yo Female ,[facility] resident with hx of  PMH Frontotemporal dementia, CVA, chronic vent dependent respiratory failure, dysphagia s/p PEG, recurrent UTI/VAP presents with septic shock from [facility] facility. She was last admitted at [facility] 10/ 2020 with proteus UTI ,sepsis ,hypernatremia and RYAN .Pseudomonas grew in sputum resistant to zosyn last time Admitted for septic workup and evaluation,send blood and urine cx,serial lactate levels,monitor vitals closley,ivfs hydration,monitor urine output and renal profile,iv abx initiated e. On arrival to ER found to be febrile to 102, WBC 20k, lactate 6.7. CXR with haziness of entire right side, urine appears cloudy. Med hx is significant for Advanced dementia ,s/p  Aphasic stroke , Constipation  ,COVID-19   ,CVA (cerebral vascular accident)  ,Dementia of frontal lobe type  ,Diabetes mellitus  ,DM (diabetes mellitus)type  2   ,GERD (gastroesophageal reflux disease)  ,HTN (hypertension)  ,Hypertension  ,Pneumonia  ,Quadriplegia  ,Respiratory failure ,s/p tracheostomy and peg Palliative care consult requested ,to discuss advance directives and complete /update  MOLST  (15 Zay 2021 07:06)      ACUTE RENAL FAILURE: hypernatremia increase water intake   Serum creatinine is 1.1   There is no progression . No uremic symptoms  pos  evidence of anemia .  Fluid status stable.  Will continue to avoid nephrotoxic drugs.  pantoprazole   Suspension 40 milliGRAM(s) Oral before breakfast    Admit for septic workup and ID evaluation,send blood and urine cx,serial lactate levels,monitor vitals closley,ivfs hydration,monitor urine output and renal profile  meropenem  IVPB 1000 milliGRAM(s) IV Intermittent every 12 hours

## 2021-06-17 NOTE — PROGRESS NOTE ADULT - SUBJECTIVE AND OBJECTIVE BOX
[facility]       HPI:  76 yo Female ,[facility] resident with hx of  PMH Frontotemporal dementia, CVA, chronic vent dependent respiratory failure, dysphagia s/p PEG, recurrent UTI/VAP presents with septic shock from [facility] facility. She was last admitted at [facility] 10/ 2020 with proteus UTI ,sepsis ,hypernatremia and RYAN .Pseudomonas grew in sputum resistant to zosyn last time Admitted for septic workup and evaluation,send blood and urine cx,serial lactate levels,monitor vitals closley,ivfs hydration,monitor urine output and renal profile,iv abx initiated e. On arrival to ER found to be febrile to 102, WBC 20k, lactate 6.7. CXR with haziness of entire right side, urine appears cloudy. Med hx is significant for Advanced dementia ,s/p  Aphasic stroke , Constipation  ,COVID-19   ,CVA (cerebral vascular accident)  ,Dementia of frontal lobe type  ,Diabetes mellitus  ,DM (diabetes mellitus)type  2   ,GERD (gastroesophageal reflux disease)  ,HTN (hypertension)  ,Hypertension  ,Pneumonia  ,Quadriplegia  ,Respiratory failure ,s/p tracheostomy and peg Palliative care consult requested ,to discuss advance directives and complete /update  MOLST  (15 Zay 2021 07:06)        SUBJECTIVE:      ALLERGIES:  Allergies    codeine (Hives)    Intolerances          MEDICATIONS  (STANDING):  ALBUTerol    90 MICROgram(s) HFA Inhaler 2 Puff(s) Inhalation every 6 hours  aspirin  chewable 81 milliGRAM(s) Oral daily  bisacodyl 5 milliGRAM(s) Oral at bedtime  caspofungin IVPB      chlorhexidine 2% Cloths 1 Application(s) Topical <User Schedule>  dextrose 40% Gel 15 Gram(s) Oral once  dextrose 5%. 1000 milliLiter(s) (50 mL/Hr) IV Continuous <Continuous>  dextrose 5%. 1000 milliLiter(s) (100 mL/Hr) IV Continuous <Continuous>  dextrose 50% Injectable 25 Gram(s) IV Push once  dextrose 50% Injectable 12.5 Gram(s) IV Push once  dextrose 50% Injectable 25 Gram(s) IV Push once  fentaNYL   Patch  12 MICROgram(s)/Hr 1 Patch Transdermal every 72 hours  glucagon  Injectable 1 milliGRAM(s) IntraMuscular once  heparin   Injectable 5000 Unit(s) SubCutaneous every 8 hours  insulin glargine Injectable (LANTUS) 20 Unit(s) SubCutaneous at bedtime  insulin lispro (ADMELOG) corrective regimen sliding scale   SubCutaneous every 6 hours  ipratropium 17 MICROgram(s) HFA Inhaler 1 Puff(s) Inhalation every 6 hours  lactated ringers Bolus 1000 milliLiter(s) IV Bolus once  lactated ringers. 500 milliLiter(s) (999 mL/Hr) IV Continuous <Continuous>  meropenem  IVPB 1000 milliGRAM(s) IV Intermittent every 8 hours  pantoprazole   Suspension 40 milliGRAM(s) Oral before breakfast  polyethylene glycol 3350 17 Gram(s) Oral daily  senna 2 Tablet(s) Oral at bedtime  sucralfate suspension 1 Gram(s) Oral every 6 hours    MEDICATIONS  (PRN):  acetaminophen    Suspension .. 650 milliGRAM(s) Oral every 6 hours PRN Temp greater or equal to 38C (100.4F)      REVIEW OF SYSTEMS:  CONSTITUTIONAL: No fever,  RESPIRATORY: No cough, wheezing, shortness of breath  CARDIOVASCULAR: No chest pain, dyspnea, palpitations, dizziness, syncope, paroxysmal nocturnal dyspnea, orthopnea, or arm or leg swelling  GASTROINTESTINAL: No abdominal  or epigastric pain, nausea, vomiting,  diarrhea  NEUROLOGICAL: No headaches,  loss of strength, numbness, or tremors    Vital Signs Last 24 Hrs  T(C): 38.4 (17 Jun 2021 20:29), Max: 38.4 (17 Jun 2021 20:29)  T(F): 101.1 (17 Jun 2021 20:29), Max: 101.1 (17 Jun 2021 20:29)  HR: 75 (17 Jun 2021 21:00) (60 - 114)  BP: 92/50 (17 Jun 2021 21:00) (66/28 - 177/73)  BP(mean): 67 (17 Jun 2021 21:00) (41 - 105)  RR: 14 (17 Jun 2021 21:00) (13 - 29)  SpO2: 100% (17 Jun 2021 21:00) (86% - 100%)    PHYSICAL EXAM:  HEAD:  Atraumatic, Normocephalic  NECK: Supple and normal thyroid.  No JVD or carotid bruit.   HEART: S1, S2 regular , 1/6 soft ejection systolic murmur in mitral area , no thrill and no gallops .  PULMONARY: Bilateral vesicular breathing , few scattered ronchi and few scattered rales are present .  ABDOMEN: Soft nontender and positive bowl sounds   EXTREMITIES:  No clubbing, cyanosis, or pedal  edema  NEUROLOGICAL: AAOX3 , no focal deficit .    LABS:                        10.8   11.38 )-----------( 267      ( 17 Jun 2021 05:12 )             35.0     06-17    148<H>  |  114<H>  |  28<H>  ----------------------------<  126<H>  3.8   |  28  |  1.00    Ca    8.8      17 Jun 2021 05:12  Phos  3.2     06-17  Mg     2.8     06-17    TPro  7.1  /  Alb  2.9<L>  /  TBili  0.4  /  DBili  x   /  AST  27  /  ALT  23  /  AlkPhos  82  06-17            BNP      EKG:  ECHO:  IMAGING:    Assessment/Plan    Will continue to follow during hospital course and give further recommendations as needed . Thanks for your referral . if any questions please contact me at office [phone] [phone])  JOSIE SON MD [place] [id number]  [address]  OFFICE : [phone]  CELL : [phone]    CARDIOLOGY F/U :       HPI:  76 yo Female ,[facility] resident with hx of  PMH Frontotemporal dementia, CVA, chronic vent dependent respiratory failure, dysphagia s/p PEG, recurrent UTI/VAP presents with septic shock from [facility] facility. She was last admitted at [facility] 10/ 2020 with proteus UTI ,sepsis ,hypernatremia and RYAN .Pseudomonas grew in sputum resistant to zosyn last time Admitted for septic workup and evaluation,send blood and urine cx,serial lactate levels,monitor vitals closley,ivfs hydration,monitor urine output and renal profile,iv abx initiated e. On arrival to ER found to be febrile to 102, WBC 20k, lactate 6.7. CXR with haziness of entire right side, urine appears cloudy. Med hx is significant for Advanced dementia ,s/p  Aphasic stroke , Constipation  ,COVID-19   ,CVA (cerebral vascular accident)  ,Dementia of frontal lobe type  ,Diabetes mellitus  ,DM (diabetes mellitus)type  2   ,GERD (gastroesophageal reflux disease)  ,HTN (hypertension)  ,Hypertension  ,Pneumonia  ,Quadriplegia  ,Respiratory failure ,s/p tracheostomy and peg Palliative care consult requested ,to discuss advance directives and complete /update  MOL  (15 Zay 2021 07:06)        SUBJECTIVE:  Patient intubated .      ALLERGIES:  Allergies    codeine (Hives)    Intolerances          MEDICATIONS  (STANDING):  ALBUTerol    90 MICROgram(s) HFA Inhaler 2 Puff(s) Inhalation every 6 hours  aspirin  chewable 81 milliGRAM(s) Oral daily  bisacodyl 5 milliGRAM(s) Oral at bedtime  caspofungin IVPB      chlorhexidine 2% Cloths 1 Application(s) Topical <User Schedule>  dextrose 40% Gel 15 Gram(s) Oral once  dextrose 5%. 1000 milliLiter(s) (50 mL/Hr) IV Continuous <Continuous>  dextrose 5%. 1000 milliLiter(s) (100 mL/Hr) IV Continuous <Continuous>  dextrose 50% Injectable 25 Gram(s) IV Push once  dextrose 50% Injectable 12.5 Gram(s) IV Push once  dextrose 50% Injectable 25 Gram(s) IV Push once  fentaNYL   Patch  12 MICROgram(s)/Hr 1 Patch Transdermal every 72 hours  glucagon  Injectable 1 milliGRAM(s) IntraMuscular once  heparin   Injectable 5000 Unit(s) SubCutaneous every 8 hours  insulin glargine Injectable (LANTUS) 20 Unit(s) SubCutaneous at bedtime  insulin lispro (ADMELOG) corrective regimen sliding scale   SubCutaneous every 6 hours  ipratropium 17 MICROgram(s) HFA Inhaler 1 Puff(s) Inhalation every 6 hours  lactated ringers Bolus 1000 milliLiter(s) IV Bolus once  lactated ringers. 500 milliLiter(s) (999 mL/Hr) IV Continuous <Continuous>  meropenem  IVPB 1000 milliGRAM(s) IV Intermittent every 8 hours  pantoprazole   Suspension 40 milliGRAM(s) Oral before breakfast  polyethylene glycol 3350 17 Gram(s) Oral daily  senna 2 Tablet(s) Oral at bedtime  sucralfate suspension 1 Gram(s) Oral every 6 hours    MEDICATIONS  (PRN):  acetaminophen    Suspension .. 650 milliGRAM(s) Oral every 6 hours PRN Temp greater or equal to 38C (100.4F)      REVIEW OF SYSTEMS:  CONSTITUTIONAL: No fever,  RESPIRATORY: Intubated .  CARDIOVASCULAR: No chest pain, palpitations, dizziness, syncope, paroxysmal nocturnal dyspnea, or arm or leg swelling and patient intubated .  GASTROINTESTINAL: No abdominal  or epigastric pain, nausea, vomiting,  diarrhea  NEUROLOGICAL: No headaches, numbness, or tremors  Skin : No itching .  Hematology : No heat and cold intolerance .  Psychiatry : patient is confused .  Endocrinology : No heat and cold intolerance .  Musculoskeletal : Patient has mild  arthritis .    Vital Signs Last 24 Hrs  T(C): 38.4 (17 Jun 2021 20:29), Max: 38.4 (17 Jun 2021 20:29)  T(F): 101.1 (17 Jun 2021 20:29), Max: 101.1 (17 Jun 2021 20:29)  HR: 75 (17 Jun 2021 21:00) (60 - 114)  BP: 92/50 (17 Jun 2021 21:00) (66/28 - 177/73)  BP(mean): 67 (17 Jun 2021 21:00) (41 - 105)  RR: 14 (17 Jun 2021 21:00) (13 - 29)  SpO2: 100% (17 Jun 2021 21:00) (86% - 100%)    PHYSICAL EXAM:  HEAD:  Atraumatic, Normocephalic  NECK: Supple and normal thyroid.  No JVD or carotid bruit.   HEART: S1, S2 regular , 1/6 soft ejection systolic murmur in mitral area , no thrill and no gallops .  PULMONARY: Bilateral vesicular breathing , few scattered rhonchi and few scattered rales are present .  ABDOMEN: Soft nontender and positive bowl sounds   EXTREMITIES:  No clubbing, cyanosis, or pedal  edema  NEUROLOGICAL: under effect of sedation .  Skin : No rashes .  Musculoskeletal : No joint swellings .    LABS:                        10.8   11.38 )-----------( 267      ( 17 Jun 2021 05:12 )             35.0     06-17    148<H>  |  114<H>  |  28<H>  ----------------------------<  126<H>  3.8   |  28  |  1.00    Ca    8.8      17 Jun 2021 05:12  Phos  3.2     06-17  Mg     2.8     06-17    TPro  7.1  /  Alb  2.9<L>  /  TBili  0.4  /  DBili  x   /  AST  27  /  ALT  23  /  AlkPhos  82  06-17            Assessment/Plan  Patient has :  1) Respiratory failure .  2) Pneumonia R/O sepsis .  3) S/P Hypotension and septic shock and BP better now and mild elevated  .  4) H/O CVA   5) Anemia   Plan : 1) I/V antibiotics as per ID 2) Monitor hemoglobin and electrolyte 3) Rest of medications a such 4) Prognosis guarded .  Will continue to follow during hospital course and give further recommendations as needed . Thanks for your referral . if any questions please contact me at office ([phone] [phone])

## 2021-06-17 NOTE — PROGRESS NOTE ADULT - ASSESSMENT
74 yo Female ,[facility] resident with hx of  PMH Frontotemporal dementia, CVA, chronic vent dependent respiratory failure, dysphagia s/p PEG, recurrent UTI/VAP presents with septic shock from [facility] facility. She was last admitted at [facility] 10/ 2020 with proteus UTI ,sepsis ,hypernatremia and RYAN .Pseudomonas grew in sputum resistant to zosyn last time Admitted for septic workup and evaluation,send blood and urine cx,serial lactate levels,monitor vitals closley,ivfs hydration,monitor urine output and renal profile,iv abx initiated e. On arrival to ER found to be febrile to 102, WBC 20k, lactate 6.7. CXR with haziness of entire right side, urine appears cloudy. Med hx is significant for Advanced dementia ,s/p  Aphasic stroke , Constipation  ,COVID-19   ,CVA (cerebral vascular accident)  ,Dementia of frontal lobe type  ,Diabetes mellitus  ,DM (diabetes mellitus)  ,GERD (gastroesophageal reflux disease)  ,HTN (hypertension)  ,Hypertension  ,Pneumonia  ,Quadriplegia  ,Respiratory failure ,s/p tracheostomy and peg Palliative care consult requested ,to discuss advance directives and complete /update  RUST

## 2021-06-17 NOTE — PROGRESS NOTE ADULT - SUBJECTIVE AND OBJECTIVE BOX
PROGRESS NOTE  Patient is a 75y old  Female who presents with a chief complaint of fever and dyspnea (17 Jun 2021 11:04)  Chart and available morning labs /imaging are reviewed electronically , urgent issues addressed . More information  is being added upon completion of rounds , when more information is collected and management discussed with consultants , medical staff and social service/case management on the floor   OVERNIGHT  Remains on vent Patient is resting in a bed comfortably  .No distress noted   T(C): 36.7 (06-17-21 @ 08:05), Max: 37.1 (06-16-21 @ 20:42) BP improved .Patient is off levaphed   HPI:  76 yo Female ,[facility] resident with hx of  PMH Frontotemporal dementia, CVA, chronic vent dependent respiratory failure, dysphagia s/p PEG, recurrent UTI/VAP presents with septic shock from [facility] facility. She was last admitted at [facility] 10/ 2020 with proteus UTI ,sepsis ,hypernatremia and RYAN .Pseudomonas grew in sputum resistant to zosyn last time Admitted for septic workup and evaluation,send blood and urine cx,serial lactate levels,monitor vitals closley,ivfs hydration,monitor urine output and renal profile,iv abx initiated e. On arrival to ER found to be febrile to 102, WBC 20k, lactate 6.7. CXR with haziness of entire right side, urine appears cloudy. Med hx is significant for Advanced dementia ,s/p  Aphasic stroke , Constipation  ,COVID-19   ,CVA (cerebral vascular accident)  ,Dementia of frontal lobe type  ,Diabetes mellitus  ,DM (diabetes mellitus)type  2   ,GERD (gastroesophageal reflux disease)  ,HTN (hypertension)  ,Hypertension  ,Pneumonia  ,Quadriplegia  ,Respiratory failure ,s/p tracheostomy and peg Palliative care consult requested ,to discuss advance directives and complete /update  MOLST  (15 Zay 2021 07:06)    PAST MEDICAL & SURGICAL HISTORY:  Dementia of frontal lobe type    Aphasic stroke    Diabetes mellitus    Respiratory failure    Hypertension    CVA (cerebral vascular accident)    HTN (hypertension)    DM (diabetes mellitus)    COVID-19 virus detected    Quadriplegia    Pneumonia    Type II diabetes mellitus    Respiratory failure    Advanced dementia    GERD (gastroesophageal reflux disease)    Constipation    Hx of appendectomy    Gastrostomy in place    Tracheostomy in place    Tracheostomy tube present    Feeding by G-tube        MEDICATIONS  (STANDING):  ALBUTerol    90 MICROgram(s) HFA Inhaler 2 Puff(s) Inhalation every 6 hours  aspirin  chewable 81 milliGRAM(s) Oral daily  bisacodyl 5 milliGRAM(s) Oral at bedtime  chlorhexidine 2% Cloths 1 Application(s) Topical <User Schedule>  dextrose 40% Gel 15 Gram(s) Oral once  dextrose 5%. 1000 milliLiter(s) (50 mL/Hr) IV Continuous <Continuous>  dextrose 5%. 1000 milliLiter(s) (100 mL/Hr) IV Continuous <Continuous>  dextrose 50% Injectable 25 Gram(s) IV Push once  dextrose 50% Injectable 12.5 Gram(s) IV Push once  dextrose 50% Injectable 25 Gram(s) IV Push once  fentaNYL   Patch  12 MICROgram(s)/Hr 1 Patch Transdermal every 72 hours  glucagon  Injectable 1 milliGRAM(s) IntraMuscular once  heparin   Injectable 5000 Unit(s) SubCutaneous every 8 hours  insulin glargine Injectable (LANTUS) 20 Unit(s) SubCutaneous at bedtime  insulin lispro (ADMELOG) corrective regimen sliding scale   SubCutaneous every 6 hours  ipratropium 17 MICROgram(s) HFA Inhaler 1 Puff(s) Inhalation every 6 hours  meropenem  IVPB 1000 milliGRAM(s) IV Intermittent every 8 hours  pantoprazole   Suspension 40 milliGRAM(s) Oral before breakfast  polyethylene glycol 3350 17 Gram(s) Oral daily  senna 2 Tablet(s) Oral at bedtime  sucralfate suspension 1 Gram(s) Oral every 6 hours    MEDICATIONS  (PRN):  acetaminophen    Suspension .. 650 milliGRAM(s) Oral every 6 hours PRN Temp greater or equal to 38C (100.4F)      OBJECTIVE    T(C): 36.7 (06-17-21 @ 08:05), Max: 37.1 (06-16-21 @ 20:42)  HR: 65 (06-17-21 @ 10:00) (60 - 115)  BP: 142/63 (06-17-21 @ 10:00) (88/43 - 175/72)  RR: 13 (06-17-21 @ 10:00) (13 - 46)  SpO2: 100% (06-17-21 @ 10:00) (93% - 100%)  Wt(kg): --  I&O's Summary    16 Jun 2021 07:01  -  17 Jun 2021 07:00  --------------------------------------------------------  IN: 2150 mL / OUT: 650 mL / NET: 1500 mL    17 Jun 2021 07:01  -  17 Jun 2021 11:21  --------------------------------------------------------  IN: 400 mL / OUT: 0 mL / NET: 400 mL          REVIEW OF SYSTEMS:  Patient is  unable to provide any information/ROS  due to baseline mental status.     PHYSICAL EXAM:  Appearance: NAD. VS past 24 hrs -as above   HEENT:   Moist oral mucosa. Conjunctiva clear b/l.   Neck : supple ,trach /vent   Respiratory: Lungs CTAB.  Gastrointestinal:  Soft, nontender. No rebound. No rigidity. BS present PEG in place 	  Cardiovascular: RRR ,S1S2 present  Neurologic: Non-focal. Moving all extremities.  Extremities: No edema. No erythema. No calf tenderness.  Skin: No rashes, No ecchymoses, No cyanosis.	  wounds ,skin lesions-See skin assesment flow sheet   LABS:                        10.8   11.38 )-----------( 267      ( 17 Jun 2021 05:12 )             35.0     06-17    148<H>  |  114<H>  |  28<H>  ----------------------------<  126<H>  3.8   |  28  |  1.00    Ca    8.8      17 Jun 2021 05:12  Phos  3.2     06-17  Mg     2.8     06-17    TPro  7.1  /  Alb  2.9<L>  /  TBili  0.4  /  DBili  x   /  AST  27  /  ALT  23  /  AlkPhos  82  06-17    CAPILLARY BLOOD GLUCOSE      POCT Blood Glucose.: 121 mg/dL (17 Jun 2021 05:38)  POCT Blood Glucose.: 132 mg/dL (16 Jun 2021 23:04)  POCT Blood Glucose.: 116 mg/dL (16 Jun 2021 21:28)  POCT Blood Glucose.: 172 mg/dL (16 Jun 2021 17:56)  POCT Blood Glucose.: 207 mg/dL (16 Jun 2021 11:46)          Culture - Urine (collected 15 Zay 2021 16:42)  Source: .Urine Catheterized  Final Report (16 Jun 2021 21:47):    50,000 - 99,000 CFU/mL Presumptive Candida albicans    "Susceptibilities not performed"    Culture - Sputum (collected 15 Zay 2021 16:28)  Source: .Sputum Sputum  Gram Stain (15 Zay 2021 18:57):    Few polymorphonuclear leukocytes per low power field    Few Squamous epithelial cells per low power field    Rare Gram Negative Rods per oil power field  Preliminary Report (16 Jun 2021 16:42):    Normal Respiratory Elvira present    Culture - Blood (collected 15 Zay 2021 04:41)  Source: .Blood Blood-Peripheral  Preliminary Report (16 Jun 2021 05:01):    No growth to date.    Culture - Blood (collected 15 Zay 2021 04:41)  Source: .Blood Blood-Peripheral  Preliminary Report (16 Jun 2021 05:01):    No growth to date.      RADIOLOGY & ADDITIONAL TESTS:< from: Xray Chest 1 View- PORTABLE-Urgent (Xray Chest 1 View- PORTABLE-Urgent .) (06.16.21 @ 00:47) >  IMPRESSION:  Tracheostomy cannula reidentified in position. Low lung volumes. No change heart mediastinum. Patient's hand projects over the right base. Elevation right hemidiaphragm similar to prior. Patchy atelectasis/infiltrate at the lung bases bilaterally. Correlate clinically for infection. No pleural effusion or pneumothorax.    < end of copied text >  < from: CT Chest No Cont (06.15.21 @ 01:24) >  EXAM:  CT ABDOMEN AND PELVIS                          EXAM:  CT CHEST                            PROCEDURE DATE:  06/15/2021          INTERPRETATION:  CLINICAL INFORMATION: Sepsis. Acute respiratory distress.  76 yo female hx of aphasic stroke, covid 19, trach/vented, dm BIBEMS from [facility] for respiratory distress, found to have fever here 101.8, was given 50mg lopressor via g-tube and 1 fleet enema PTA.    COMPARISON: CT chest from 5/8/2020 and CT abdomen pelvis from 7/21/2017    CONTRAST/COMPLICATIONS:  IV Contrast: NONE  0 cc administered   0 cc discarded  Oral Contrast: NONE  Complications: None reported at time of study completion    CLINICAL HISTORY:    TECHNIQUE: CT of the chest, abdomen and pelvis without IV contrast. Oral contrast was withheld.  Transaxial images were acquired from the thoracic inlet through to the pubic symphysis without the administration of IV contrast. Coronal and sagittal reformatted images are provided.    FINDINGS:  There is motion artifact present which degrades image quality.    Chest CT:  Interval resolution of predominantly peripherally distributed groundglass opacities in both lungs and right upper and middle lobe airspace consolidation. There are nonspecific patchy airspace opacitiesand both lower lobes. Resolved small right pleural effusion and residual trace left pleural effusion. There is no pneumothorax. Tracheostomy tube is again present within the trachea with stable diameter of the trachea of about 3.2 cm. The trachea andmain bronchi are clear.    The heart is within normal limits size. There is a trace pericardial effusion. The thoracic aorta and main pulmonary arteries are normal in caliber.    There is no significant supraclavicular, axillary, mediastinal or hilaradenopathy.    The thyroid gland is unremarkable.    The osseous structures within the thorax have no acute finding.    CT abdomen/pelvis:  The unenhanced appearance of the liver, spleen, pancreas, gallbladder and right adrenal gland is unremarkable.Stable indeterminate 1.5 cm left adrenal nodule. There is no hydronephrosis or perinephric stranding. There is a vague 0.8 cm hyperdense lesion in the upper pole of the left kidney.    Gastrostomy tube is again in the stomach. There is no bowel obstruction, free air or free fluid. The appendix is surgically removed. The rectum is moderately distended with fluid type fecal material without significant wall thickening. Questionable mild wall thickening of the sigmoid colon. No significant pericolonic inflammatory change. Scattered diverticula without diverticulitis.    The abdominal aorta is normal in caliber. There is no retroperitoneal hematoma. There is no significant adenopathy in the upper abdomen, retroperitoneum or pelvis.    The urinarybladder is collapsed around a Woody balloon catheter. Tiny amount of air in the bladder lumen likely due to recent instrumentation. The uterus and adnexal structures are within normal limits.    There is a healing left femoral intertrochanteric fracture with callus and heterotopic bone formation. There are no acute osseous abnormalities.    IMPRESSION:  Chest CT: Motion degraded exam. Nonspecific patchy airspace opacities in the lower lobes which can be on an infectious basis or possibly related to aspiration. Clinical correlation recommended. Residual trace left pleural effusion.    CT abdomen/pelvis: Questionable mild sigmoid colon thickening.  Indeterminant 0.8 cm hyperdense lesion in the upper pole left kidney.    < end of copied text >     reviewed elctronically  ASSESSMENT/PLAN: 	  25minutes spent on this visit, 50% visit time spent in care co-ordination with other attendings and counselling patient  I have discussed care plan with patient and HCP,expressed understanding of problems treatment and their effect and side effects, alternatives in detail,I have asked if they have any questions and concerns and appropriately addressed them to best of my ability

## 2021-06-17 NOTE — PROGRESS NOTE ADULT - SUBJECTIVE AND OBJECTIVE BOX
[facility] DIVISION of INFECTIOUS DISEASE  Arturo Olivas MD PhD, Haylee Umanzor MD, Andressa Brantley MD, Billy Valenzuela MD  and providing coverage with Alexa Canas MD and Eusebio Chairez MD  Providing Infectious Disease Consultations at [facility], [facility], [facility]'s    Office# [phone] to schedule follow up appointments  Answering Service for urgent calls or New Consults [phone]  Cell# to text for urgent issues Arturo Olivas [phone]     infectious diseases progress note:    BREA BECKHAM is a 75y y. o. Female patient    Patient remains on vent    Allergies    codeine (Hives)    Intolerances        ANTIBIOTICS/RELEVANT:  antimicrobials  meropenem  IVPB      meropenem  IVPB 1000 milliGRAM(s) IV Intermittent every 12 hours    immunologic:    OTHER:  acetaminophen    Suspension .. 650 milliGRAM(s) Oral every 6 hours PRN  ALBUTerol    90 MICROgram(s) HFA Inhaler 2 Puff(s) Inhalation every 6 hours  aspirin  chewable 81 milliGRAM(s) Oral daily  bisacodyl 5 milliGRAM(s) Oral at bedtime  chlorhexidine 2% Cloths 1 Application(s) Topical <User Schedule>  dextrose 40% Gel 15 Gram(s) Oral once  dextrose 5%. 1000 milliLiter(s) IV Continuous <Continuous>  dextrose 5%. 1000 milliLiter(s) IV Continuous <Continuous>  dextrose 50% Injectable 25 Gram(s) IV Push once  dextrose 50% Injectable 12.5 Gram(s) IV Push once  dextrose 50% Injectable 25 Gram(s) IV Push once  fentaNYL   Patch  12 MICROgram(s)/Hr 1 Patch Transdermal every 72 hours  glucagon  Injectable 1 milliGRAM(s) IntraMuscular once  heparin   Injectable 5000 Unit(s) SubCutaneous every 8 hours  insulin glargine Injectable (LANTUS) 20 Unit(s) SubCutaneous at bedtime  insulin lispro (ADMELOG) corrective regimen sliding scale   SubCutaneous every 6 hours  ipratropium 17 MICROgram(s) HFA Inhaler 1 Puff(s) Inhalation every 6 hours  pantoprazole   Suspension 40 milliGRAM(s) Oral before breakfast  polyethylene glycol 3350 17 Gram(s) Oral daily  senna 2 Tablet(s) Oral at bedtime  sucralfate suspension 1 Gram(s) Oral every 6 hours      Objective:  Last 24-Vital Signs Last 24 Hrs  T(C): 36.7 (17 Jun 2021 08:05), Max: 37.1 (16 Jun 2021 20:42)  T(F): 98.1 (17 Jun 2021 08:05), Max: 98.8 (16 Jun 2021 20:42)  HR: 65 (17 Jun 2021 10:00) (60 - 115)  BP: 142/63 (17 Jun 2021 10:00) (88/43 - 175/72)  BP(mean): 90 (17 Jun 2021 10:00) (62 - 103)  RR: 13 (17 Jun 2021 10:00) (13 - 46)  SpO2: 100% (17 Jun 2021 10:00) (93% - 100%)    T(C): 36.7 (06-17-21 @ 08:05), Max: 38.9 (06-16-21 @ 10:00)  T(F): 98.1 (06-17-21 @ 08:05), Max: 102 (06-16-21 @ 10:00)  T(C): 36.7 (06-17-21 @ 08:05), Max: 38.9 (06-16-21 @ 10:00)  T(F): 98.1 (06-17-21 @ 08:05), Max: 102 (06-16-21 @ 10:00)  T(C): 36.7 (06-17-21 @ 08:05), Max: 38.9 (06-16-21 @ 10:00)  T(F): 98.1 (06-17-21 @ 08:05), Max: 102 (06-16-21 @ 10:00)    PHYSICAL EXAM:  Constitutional: Well-developed, well nourished  Eyes: PERRLA, EOMI  Ear/Nose/Throat: oropharynx normal	  Neck: no JVD, no lymphadenopathy, supple, intubated via trach  Respiratory: no accessory muscle use, lung fields bilaterally clear  Cardiovascular: distant  Gastrointestinal: soft, NT, no HSM, BS-normal  Extremities: no clubbing, no cyanosis, contracted  Neuro: sedate    Mode: AC/ CMV, RR (machine): 14, TV (machine): 400, FiO2: 25, PEEP: 5, ITime: 1, MAP: 10, PIP: 32    LABS:                        10.8   11.38 )-----------( 267      ( 17 Jun 2021 05:12 )             35.0       WBC 11.38  06-17 @ 05:12  WBC 11.26  06-16 @ 05:32  WBC 12.57  06-15 @ 05:22  WBC 24.34  06-14 @ 23:02      06-17    148<H>  |  114<H>  |  28<H>  ----------------------------<  126<H>  3.8   |  28  |  1.00    Ca    8.8      17 Jun 2021 05:12  Phos  3.2     06-17  Mg     2.8     06-17    TPro  7.1  /  Alb  2.9<L>  /  TBili  0.4  /  DBili  x   /  AST  27  /  ALT  23  /  AlkPhos  82  06-17      Creatinine, Serum: 1.00 mg/dL (06-17-21 @ 05:12)  Creatinine, Serum: 1.10 mg/dL (06-16-21 @ 05:32)  Creatinine, Serum: 1.30 mg/dL (06-15-21 @ 05:22)  Creatinine, Serum: 1.70 mg/dL (06-14-21 @ 23:02)                MICROBIOLOGY:              RADIOLOGY & ADDITIONAL STUDIES:

## 2021-06-17 NOTE — PROGRESS NOTE ADULT - ASSESSMENT
76 yo female hx of aphasic stroke, covid 19, trach/vented, dm BIBEMS from [facility] for respiratory distress, found to have fever here 101.8  sepsis eval in progress  on ABX - cx in progress  supportive ICU measures  vent support  not a candidate for weaning  spoke with   pt is full code  pain rx regimen - Fentanyl - Opioids -

## 2021-06-17 NOTE — PROGRESS NOTE ADULT - SUBJECTIVE AND OBJECTIVE BOX
75F dementia, CVA, chronic respiratory failure s/p trach/ PEG, recurrent UTI/VAP presented 6/15 with  with septic shock RX for VAP/UTI   Cultures + with Candida     Caspofungin added to Meropenum.    Has improved shock/leukocytosis and renal fx.  Was actually hypertensive during the day.      Now febrile hypotensive.    Fluids bolused> BP recovered and dropped again     Blood cultures were sent earlier today                           75F dementia, CVA, chronic respiratory failure s/p trach/ PEG, recurrent UTI/VAP presented 6/15 with  with septic shock RX for VAP/UTI   Urine culture + with Candida albicans  CCT 34    Caspofungin added to Meropenum.    Has improved shock/leukocytosis and renal fx.  Was actually hypertensive during the day.      Now febrile hypotensive.     Fluids bolused> BP recovered and dropped again     Blood cultures were sent earlier today     POCUS does not suggest the need for additional fluids.    If persistently hypotensive  start peripheral Pressor.  18G angiocath placed in L EJ

## 2021-06-17 NOTE — PROGRESS NOTE ADULT - ASSESSMENT
74F PMH Frontotemporal dementia, CVA, chronic vent dependent respiratory failure, dysphagia s/p PEG, recurrent UTI/VAP presents with septic shock from [facility] facility. Source most likely urine, r/o PNA as well.     Neuro  - Baseline dementia from previous notes, unable to follow commands, but awake and alert.   - continue Fentanyl patch    CV  Hypotension likely 2/2 sepsis now resolving  - BP improving with MAP >80  - Off Levophed   - hemodynamically stable    Pulm  r/o PNA  - Hx of VAP with Pseudomonal resistant to Zosyn but sensitive to Meropenem   - CT chest 6/15 shows Nonspecific patchy airspace opacities in the lower lobes which can be on an infectious basis or possibly related to aspiration. Residual trace left pleural effusion  - Tolerating vent settings well  - Home dose inhalers   - Sputum cultures shows normal alejandro, but rare gram neg rods. F/u final results   - C/w Meropenem     GI  - continue PEG tube feeds  - continue bowel regimen senna and miralax, and added Dulcolax to regimen    Renal  RYAN resolved  - Likely 2/2 hypotension in the setting of sepsis  - S/p IVF hydration  - Monitor UOP  - Trend renal indices    ID  Septic shock, h/o proteus mirabilis indol neg in urine and pseudomonas resistant to Zosyn   - S/p 4L IVF resuscitation   - Afebrile >24hrs   - MRSA negative   - BCx NGTD  - UCx Candida albicans  - Sputum cultures shows normal alejandro, but rare gram neg rods. F/u final results   - Continue Meropenem. Added Caspo   - Monitor hemodynamics and labs, F/u Fungitell lab    UTI  - Hx of proteus UTI  - UA borderline positive   - UCx Candida albicans  - Started Caspo     Heme  - DVT ppx HSQ    Endo  Type 2 DM  - corrective sliding scale  - continue Lantus 20qhs     Dispo: Continues to require ICU level care  Full code

## 2021-06-17 NOTE — PROGRESS NOTE ADULT - SUBJECTIVE AND OBJECTIVE BOX
Patient is a 75y Female whom presented to the hospital with ckd and manasa     PAST MEDICAL & SURGICAL HISTORY:  Dementia of frontal lobe type    Aphasic stroke    Diabetes mellitus    Respiratory failure    Hypertension    GERD (gastroesophageal reflux disease)    Constipation    Respiratory failure    CVA (cerebral vascular accident)    HTN (hypertension)    DM (diabetes mellitus)    Advanced dementia    COVID-19 virus detected    Quadriplegia    Pneumonia    Type II diabetes mellitus    Hx of appendectomy    Gastrostomy in place    Tracheostomy in place    Tracheostomy tube present    Feeding by G-tube        MEDICATIONS  (STANDING):  ALBUTerol    90 MICROgram(s) HFA Inhaler 2 Puff(s) Inhalation every 6 hours  aspirin  chewable 81 milliGRAM(s) Oral daily  chlorhexidine 2% Cloths 1 Application(s) Topical <User Schedule>  dextrose 40% Gel 15 Gram(s) Oral once  dextrose 5%. 1000 milliLiter(s) (50 mL/Hr) IV Continuous <Continuous>  dextrose 5%. 1000 milliLiter(s) (100 mL/Hr) IV Continuous <Continuous>  dextrose 50% Injectable 25 Gram(s) IV Push once  dextrose 50% Injectable 12.5 Gram(s) IV Push once  dextrose 50% Injectable 25 Gram(s) IV Push once  glucagon  Injectable 1 milliGRAM(s) IntraMuscular once  heparin   Injectable 5000 Unit(s) SubCutaneous every 8 hours  insulin glargine Injectable (LANTUS) 20 Unit(s) SubCutaneous at bedtime  insulin lispro (ADMELOG) corrective regimen sliding scale   SubCutaneous every 6 hours  ipratropium 17 MICROgram(s) HFA Inhaler 1 Puff(s) Inhalation every 6 hours  meropenem  IVPB      meropenem  IVPB 1000 milliGRAM(s) IV Intermittent every 12 hours  pantoprazole   Suspension 40 milliGRAM(s) Oral before breakfast  polyethylene glycol 3350 17 Gram(s) Oral daily  senna 2 Tablet(s) Oral at bedtime      Allergies    codeine (Hives)    Intolerances        SOCIAL HISTORY:  Denies ETOh,Smoking,     FAMILY HISTORY:  No pertinent family history in first degree relatives        REVIEW OF SYSTEMS:    unable to obtained a good review system                                   10.8   11.38 )-----------( 267      ( 17 Jun 2021 05:12 )             35.0       CBC Full  -  ( 17 Jun 2021 05:12 )  WBC Count : 11.38 K/uL  RBC Count : 3.93 M/uL  Hemoglobin : 10.8 g/dL  Hematocrit : 35.0 %  Platelet Count - Automated : 267 K/uL  Mean Cell Volume : 89.1 fl  Mean Cell Hemoglobin : 27.5 pg  Mean Cell Hemoglobin Concentration : 30.9 gm/dL  Auto Neutrophil # : 8.13 K/uL  Auto Lymphocyte # : 2.39 K/uL  Auto Monocyte # : 0.62 K/uL  Auto Eosinophil # : 0.14 K/uL  Auto Basophil # : 0.04 K/uL  Auto Neutrophil % : 71.5 %  Auto Lymphocyte % : 21.0 %  Auto Monocyte % : 5.4 %  Auto Eosinophil % : 1.2 %  Auto Basophil % : 0.4 %      06-17    148<H>  |  114<H>  |  28<H>  ----------------------------<  126<H>  3.8   |  28  |  1.00    Ca    8.8      17 Jun 2021 05:12  Phos  3.2     06-17  Mg     2.8     06-17    TPro  7.1  /  Alb  2.9<L>  /  TBili  0.4  /  DBili  x   /  AST  27  /  ALT  23  /  AlkPhos  82  06-17      CAPILLARY BLOOD GLUCOSE      POCT Blood Glucose.: 137 mg/dL (17 Jun 2021 17:26)  POCT Blood Glucose.: 136 mg/dL (17 Jun 2021 11:48)  POCT Blood Glucose.: 121 mg/dL (17 Jun 2021 05:38)  POCT Blood Glucose.: 132 mg/dL (16 Jun 2021 23:04)  POCT Blood Glucose.: 116 mg/dL (16 Jun 2021 21:28)      Vital Signs Last 24 Hrs  T(C): 37.9 (17 Jun 2021 18:00), Max: 37.9 (17 Jun 2021 18:00)  T(F): 100.2 (17 Jun 2021 18:00), Max: 100.2 (17 Jun 2021 18:00)  HR: 74 (17 Jun 2021 20:11) (60 - 114)  BP: 113/56 (17 Jun 2021 18:00) (113/56 - 177/73)  BP(mean): 79 (17 Jun 2021 18:00) (76 - 105)  RR: 20 (17 Jun 2021 18:00) (13 - 27)  SpO2: 100% (17 Jun 2021 20:11) (86% - 100%)                          PHYSICAL EXAM:    Constitutional: NAD  HEENT: conjunctive   clear   Neck:  No JVD  Respiratory: decrease bs b/l , pos trach   Cardiovascular: S1 and S2  Gastrointestinal: BS+, soft, pos peg   Extremities: No peripheral edema

## 2021-06-17 NOTE — PROGRESS NOTE ADULT - SUBJECTIVE AND OBJECTIVE BOX
Date/Time Patient Seen:  		  Referring MD:   Data Reviewed	       Patient is a 75y old  Female who presents with a chief complaint of fever and dyspnea (16 Jun 2021 23:35)      Subjective/HPI     PAST MEDICAL & SURGICAL HISTORY:  Dementia of frontal lobe type    Aphasic stroke    Diabetes mellitus    Respiratory failure    Hypertension    GERD (gastroesophageal reflux disease)    Constipation    Respiratory failure    CVA (cerebral vascular accident)    HTN (hypertension)    DM (diabetes mellitus)    Advanced dementia    COVID-19 virus detected    Quadriplegia    Pneumonia    Type II diabetes mellitus    Hx of appendectomy    Gastrostomy in place    Tracheostomy in place    Tracheostomy tube present    Feeding by G-tube          Medication list         MEDICATIONS  (STANDING):  ALBUTerol    90 MICROgram(s) HFA Inhaler 2 Puff(s) Inhalation every 6 hours  aspirin  chewable 81 milliGRAM(s) Oral daily  bisacodyl 5 milliGRAM(s) Oral at bedtime  chlorhexidine 2% Cloths 1 Application(s) Topical <User Schedule>  dextrose 40% Gel 15 Gram(s) Oral once  dextrose 5%. 1000 milliLiter(s) (50 mL/Hr) IV Continuous <Continuous>  dextrose 5%. 1000 milliLiter(s) (100 mL/Hr) IV Continuous <Continuous>  dextrose 50% Injectable 25 Gram(s) IV Push once  dextrose 50% Injectable 12.5 Gram(s) IV Push once  dextrose 50% Injectable 25 Gram(s) IV Push once  fentaNYL   Patch  12 MICROgram(s)/Hr 1 Patch Transdermal every 72 hours  glucagon  Injectable 1 milliGRAM(s) IntraMuscular once  heparin   Injectable 5000 Unit(s) SubCutaneous every 8 hours  insulin glargine Injectable (LANTUS) 20 Unit(s) SubCutaneous at bedtime  insulin lispro (ADMELOG) corrective regimen sliding scale   SubCutaneous every 6 hours  ipratropium 17 MICROgram(s) HFA Inhaler 1 Puff(s) Inhalation every 6 hours  meropenem  IVPB      meropenem  IVPB 1000 milliGRAM(s) IV Intermittent every 12 hours  pantoprazole   Suspension 40 milliGRAM(s) Oral before breakfast  polyethylene glycol 3350 17 Gram(s) Oral daily  senna 2 Tablet(s) Oral at bedtime  sucralfate suspension 1 Gram(s) Oral every 6 hours    MEDICATIONS  (PRN):  acetaminophen    Suspension .. 650 milliGRAM(s) Oral every 6 hours PRN Temp greater or equal to 38C (100.4F)         Vitals log        ICU Vital Signs Last 24 Hrs  T(C): 36.4 (17 Jun 2021 04:00), Max: 38.9 (16 Jun 2021 10:00)  T(F): 97.5 (17 Jun 2021 04:00), Max: 102 (16 Jun 2021 10:00)  HR: 60 (17 Jun 2021 06:00) (60 - 145)  BP: 140/58 (17 Jun 2021 06:00) (88/43 - 190/88)  BP(mean): 84 (17 Jun 2021 06:00) (62 - 124)  ABP: --  ABP(mean): --  RR: 14 (17 Jun 2021 06:00) (14 - 51)  SpO2: 100% (17 Jun 2021 06:00) (81% - 100%)       Mode: AC/ CMV (Assist Control/ Continuous Mandatory Ventilation)  RR (machine): 14  TV (machine): 400  FiO2: 25  PEEP: 5  ITime: 1  MAP: 12  PIP: 34      Input and Output:  I&O's Detail    15 Zay 2021 07:01  -  16 Jun 2021 07:00  --------------------------------------------------------  IN:    Enteral Tube Flush: 130 mL    Free Water: 650 mL    Glucerna 1.5: 470 mL    IV PiggyBack: 100 mL  Total IN: 1350 mL    OUT:    Indwelling Catheter - Urethral (mL): 860 mL  Total OUT: 860 mL    Total NET: 490 mL      16 Jun 2021 07:01  -  17 Jun 2021 06:33  --------------------------------------------------------  IN:    Enteral Tube Flush: 100 mL    Free Water: 1000 mL    Glucerna 1.5: 950 mL    IV PiggyBack: 50 mL  Total IN: 2100 mL    OUT:    Indwelling Catheter - Urethral (mL): 250 mL    Voided (mL): 400 mL  Total OUT: 650 mL    Total NET: 1450 mL          Lab Data                        10.8   11.38 )-----------( 267      ( 17 Jun 2021 05:12 )             35.0     06-17    148<H>  |  114<H>  |  28<H>  ----------------------------<  126<H>  3.8   |  28  |  1.00    Ca    8.8      17 Jun 2021 05:12  Phos  3.2     06-17  Mg     2.8     06-17    TPro  7.1  /  Alb  2.9<L>  /  TBili  0.4  /  DBili  x   /  AST  27  /  ALT  23  /  AlkPhos  82  06-17      CARDIAC MARKERS ( 15 Zay 2021 14:40 )  <.015 ng/mL / x     / x     / x     / x            Review of Systems	      Objective     Physical Examination    heart s1s2  lung dec BS  abd soft  head nc  peg  trach      Pertinent Lab findings & Imaging      Annalise:  NO   Adequate UO     I&O's Detail    15 Zay 2021 07:01  -  16 Jun 2021 07:00  --------------------------------------------------------  IN:    Enteral Tube Flush: 130 mL    Free Water: 650 mL    Glucerna 1.5: 470 mL    IV PiggyBack: 100 mL  Total IN: 1350 mL    OUT:    Indwelling Catheter - Urethral (mL): 860 mL  Total OUT: 860 mL    Total NET: 490 mL      16 Jun 2021 07:01  -  17 Jun 2021 06:33  --------------------------------------------------------  IN:    Enteral Tube Flush: 100 mL    Free Water: 1000 mL    Glucerna 1.5: 950 mL    IV PiggyBack: 50 mL  Total IN: 2100 mL    OUT:    Indwelling Catheter - Urethral (mL): 250 mL    Voided (mL): 400 mL  Total OUT: 650 mL    Total NET: 1450 mL               Discussed with:     Cultures:	        Radiology

## 2021-06-17 NOTE — PROGRESS NOTE ADULT - ATTENDING COMMENTS
75F PMH Frontotemporal dementia, CVA, chronic respiratory failure s/p trach, vent-dependent, bedbound, dysphagia s/p PEG, recurrent UTI/VAP presents with septic shock likely due to recurrent UTI vs. VAP, found to have Candida albicans UTI. Also with neutrophilic leukocytosis, RYAN, and lactic acidosis, now improved.    1. Neuro: mental status at baseline, remains awake and alert, but unresponsive. Had recurrent episode of tachypnea, tachycardia, and agitation with recurrent fever - give acetaminophen 1000 mg IV, lorazepam 1 mg IV, and check blood cultures x2. Continue fentanyl patch at 12 mcg  2. CV: HD stable, continue aspirin 81 mg daily. Has history of diastolic dysfunction, mild MR  3. Pulm: continue lung protective ventilation, on minimal FiO2 25% and PEEP 5. Sputum culture with normal respiratory alejandro. Continue albuterol-ipratropium  4. GI: continue Glucerna tube feeds. Continue PPI and sucralfate. Bowel regimen with senna, bisacodyl, and miralax  5. Renal: RYAN and lactic acidosis resolved. Strict I/O's, trend kidney function and lytes  6. ID: C. albicans UTI vs. VAP, continue meropenem. Start Caspofungin. Repeat blood cultures. Sputum culture with normal respiratory alejandro  7. Endo: DM2, continue insulin coverage scale and Lantus 20 qhs. A1C 8.5  8. Heme: DVT ppx with HSQ  9. Skin: no lines or vaughn  10. Dispo: full code, discussed with , poor overall prognosis  CC time spent: 35 min

## 2021-06-17 NOTE — PROGRESS NOTE ADULT - SUBJECTIVE AND OBJECTIVE BOX
BREA BECKHAM    [facility] ICU1 12A    Patient is a 75y old  Female who presents with a chief complaint of fever and dyspnea (17 Jun 2021 06:33)       Allergies    codeine (Hives)    Intolerances        HPI:  76 yo Female ,Critical access hospital resident with hx of  PMH Frontotemporal dementia, CVA, chronic vent dependent respiratory failure, dysphagia s/p PEG, recurrent UTI/VAP presents with septic shock from [facility] facility. She was last admitted at [facility] 10/ 2020 with proteus UTI ,sepsis ,hypernatremia and RYAN .Pseudomonas grew in sputum resistant to zosyn last time Admitted for septic workup and evaluation,send blood and urine cx,serial lactate levels,monitor vitals closley,ivfs hydration,monitor urine output and renal profile,iv abx initiated e. On arrival to ER found to be febrile to 102, WBC 20k, lactate 6.7. CXR with haziness of entire right side, urine appears cloudy. Med hx is significant for Advanced dementia ,s/p  Aphasic stroke , Constipation  ,COVID-19   ,CVA (cerebral vascular accident)  ,Dementia of frontal lobe type  ,Diabetes mellitus  ,DM (diabetes mellitus)type  2   ,GERD (gastroesophageal reflux disease)  ,HTN (hypertension)  ,Hypertension  ,Pneumonia  ,Quadriplegia  ,Respiratory failure ,s/p tracheostomy and peg Palliative care consult requested ,to discuss advance directives and complete /update  MOLST  (15 Zay 2021 07:06)      PAST MEDICAL & SURGICAL HISTORY:  Dementia of frontal lobe type    Aphasic stroke    Diabetes mellitus    Respiratory failure    Hypertension    GERD (gastroesophageal reflux disease)    Constipation    Respiratory failure    CVA (cerebral vascular accident)    HTN (hypertension)    DM (diabetes mellitus)    Advanced dementia    COVID-19 virus detected    Quadriplegia    Pneumonia    Type II diabetes mellitus    Hx of appendectomy    Gastrostomy in place    Tracheostomy in place    Tracheostomy tube present    Feeding by G-tube        FAMILY HISTORY:  No pertinent family history in first degree relatives          MEDICATIONS   acetaminophen    Suspension .. 650 milliGRAM(s) Oral every 6 hours PRN  ALBUTerol    90 MICROgram(s) HFA Inhaler 2 Puff(s) Inhalation every 6 hours  aspirin  chewable 81 milliGRAM(s) Oral daily  bisacodyl 5 milliGRAM(s) Oral at bedtime  chlorhexidine 2% Cloths 1 Application(s) Topical <User Schedule>  dextrose 40% Gel 15 Gram(s) Oral once  dextrose 5%. 1000 milliLiter(s) IV Continuous <Continuous>  dextrose 5%. 1000 milliLiter(s) IV Continuous <Continuous>  dextrose 50% Injectable 25 Gram(s) IV Push once  dextrose 50% Injectable 12.5 Gram(s) IV Push once  dextrose 50% Injectable 25 Gram(s) IV Push once  fentaNYL   Patch  12 MICROgram(s)/Hr 1 Patch Transdermal every 72 hours  glucagon  Injectable 1 milliGRAM(s) IntraMuscular once  heparin   Injectable 5000 Unit(s) SubCutaneous every 8 hours  insulin glargine Injectable (LANTUS) 20 Unit(s) SubCutaneous at bedtime  insulin lispro (ADMELOG) corrective regimen sliding scale   SubCutaneous every 6 hours  ipratropium 17 MICROgram(s) HFA Inhaler 1 Puff(s) Inhalation every 6 hours  meropenem  IVPB      meropenem  IVPB 1000 milliGRAM(s) IV Intermittent every 12 hours  pantoprazole   Suspension 40 milliGRAM(s) Oral before breakfast  polyethylene glycol 3350 17 Gram(s) Oral daily  senna 2 Tablet(s) Oral at bedtime  sucralfate suspension 1 Gram(s) Oral every 6 hours      Vital Signs Last 24 Hrs  T(C): 36.7 (17 Jun 2021 08:05), Max: 37.1 (16 Jun 2021 20:42)  T(F): 98.1 (17 Jun 2021 08:05), Max: 98.8 (16 Jun 2021 20:42)  HR: 65 (17 Jun 2021 10:00) (60 - 115)  BP: 142/63 (17 Jun 2021 10:00) (88/43 - 175/72)  BP(mean): 90 (17 Jun 2021 10:00) (62 - 103)  RR: 13 (17 Jun 2021 10:00) (13 - 46)  SpO2: 100% (17 Jun 2021 10:00) (93% - 100%)      06-16-21 @ 07:01  -  06-17-21 @ 07:00  --------------------------------------------------------  IN: 2150 mL / OUT: 650 mL / NET: 1500 mL    06-17-21 @ 07:01  -  06-17-21 @ 10:38  --------------------------------------------------------  IN: 400 mL / OUT: 0 mL / NET: 400 mL        Mode: AC/ CMV (Assist Control/ Continuous Mandatory Ventilation), RR (machine): 14, TV (machine): 400, FiO2: 25, PEEP: 5, ITime: 1, MAP: 10, PIP: 32    LABS:                        10.8   11.38 )-----------( 267      ( 17 Jun 2021 05:12 )             35.0     06-17    148<H>  |  114<H>  |  28<H>  ----------------------------<  126<H>  3.8   |  28  |  1.00    Ca    8.8      17 Jun 2021 05:12  Phos  3.2     06-17  Mg     2.8     06-17    TPro  7.1  /  Alb  2.9<L>  /  TBili  0.4  /  DBili  x   /  AST  27  /  ALT  23  /  AlkPhos  82  06-17              WBC:  WBC Count: 11.38 K/uL (06-17 @ 05:12)  WBC Count: 11.26 K/uL (06-16 @ 05:32)  WBC Count: 12.57 K/uL (06-15 @ 05:22)  WBC Count: 24.34 K/uL (06-14 @ 23:02)      MICROBIOLOGY:  RECENT CULTURES:  06-15 .Urine Catheterized XXXX XXXX   50,000 - 99,000 CFU/mL Presumptive Candida albicans  "Susceptibilities not performed"    06-15 .Sputum Sputum XXXX   Few polymorphonuclear leukocytes per low power field  Few Squamous epithelial cells per low power field  Rare Gram Negative Rods per oil power field   Normal Respiratory Elvira present    06-15 .Blood Blood-Peripheral XXXX XXXX   No growth to date.          CARDIAC MARKERS ( 15 Zay 2021 14:40 )  <.015 ng/mL / x     / x     / x     / x                Sodium:  Sodium, Serum: 148 mmol/L (06-17 @ 05:12)  Sodium, Serum: 145 mmol/L (06-16 @ 05:32)  Sodium, Serum: 147 mmol/L (06-15 @ 05:22)  Sodium, Serum: 140 mmol/L (06-14 @ 23:02)      1.00 mg/dL 06-17 @ 05:12  1.10 mg/dL 06-16 @ 05:32  1.30 mg/dL 06-15 @ 05:22  1.70 mg/dL 06-14 @ 23:02      Hemoglobin:  Hemoglobin: 10.8 g/dL (06-17 @ 05:12)  Hemoglobin: 10.6 g/dL (06-16 @ 05:32)  Hemoglobin: 9.7 g/dL (06-15 @ 05:22)  Hemoglobin: 12.3 g/dL (06-14 @ 23:02)      Platelets: Platelet Count - Automated: 267 K/uL (06-17 @ 05:12)  Platelet Count - Automated: 275 K/uL (06-16 @ 05:32)  Platelet Count - Automated: 258 K/uL (06-15 @ 05:22)  Platelet Count - Automated: 471 K/uL (06-14 @ 23:02)      LIVER FUNCTIONS - ( 17 Jun 2021 05:12 )  Alb: 2.9 g/dL / Pro: 7.1 g/dL / ALK PHOS: 82 U/L / ALT: 23 U/L / AST: 27 U/L / GGT: x                 RADIOLOGY & ADDITIONAL STUDIES:

## 2021-06-18 LAB
ALBUMIN SERPL ELPH-MCNC: 2.5 G/DL — LOW (ref 3.3–5)
ALP SERPL-CCNC: 89 U/L — SIGNIFICANT CHANGE UP (ref 40–120)
ALT FLD-CCNC: 32 U/L — SIGNIFICANT CHANGE UP (ref 12–78)
ANION GAP SERPL CALC-SCNC: 2 MMOL/L — LOW (ref 5–17)
AST SERPL-CCNC: 42 U/L — HIGH (ref 15–37)
BASOPHILS # BLD AUTO: 0.05 K/UL — SIGNIFICANT CHANGE UP (ref 0–0.2)
BASOPHILS NFR BLD AUTO: 0.6 % — SIGNIFICANT CHANGE UP (ref 0–2)
BILIRUB SERPL-MCNC: 0.4 MG/DL — SIGNIFICANT CHANGE UP (ref 0.2–1.2)
BUN SERPL-MCNC: 24 MG/DL — HIGH (ref 7–23)
CALCIUM SERPL-MCNC: 8.5 MG/DL — SIGNIFICANT CHANGE UP (ref 8.5–10.1)
CHLORIDE SERPL-SCNC: 112 MMOL/L — HIGH (ref 96–108)
CO2 SERPL-SCNC: 29 MMOL/L — SIGNIFICANT CHANGE UP (ref 22–31)
CREAT SERPL-MCNC: 0.83 MG/DL — SIGNIFICANT CHANGE UP (ref 0.5–1.3)
EOSINOPHIL # BLD AUTO: 0.19 K/UL — SIGNIFICANT CHANGE UP (ref 0–0.5)
EOSINOPHIL NFR BLD AUTO: 2.3 % — SIGNIFICANT CHANGE UP (ref 0–6)
GLUCOSE SERPL-MCNC: 143 MG/DL — HIGH (ref 70–99)
HCT VFR BLD CALC: 34 % — LOW (ref 34.5–45)
HGB BLD-MCNC: 10.5 G/DL — LOW (ref 11.5–15.5)
IMM GRANULOCYTES NFR BLD AUTO: 1.4 % — SIGNIFICANT CHANGE UP (ref 0–1.5)
LYMPHOCYTES # BLD AUTO: 0.52 K/UL — LOW (ref 1–3.3)
LYMPHOCYTES # BLD AUTO: 6.4 % — LOW (ref 13–44)
MAGNESIUM SERPL-MCNC: 2.5 MG/DL — SIGNIFICANT CHANGE UP (ref 1.6–2.6)
MCHC RBC-ENTMCNC: 27.9 PG — SIGNIFICANT CHANGE UP (ref 27–34)
MCHC RBC-ENTMCNC: 30.9 GM/DL — LOW (ref 32–36)
MCV RBC AUTO: 90.2 FL — SIGNIFICANT CHANGE UP (ref 80–100)
MONOCYTES # BLD AUTO: 0.34 K/UL — SIGNIFICANT CHANGE UP (ref 0–0.9)
MONOCYTES NFR BLD AUTO: 4.2 % — SIGNIFICANT CHANGE UP (ref 2–14)
NEUTROPHILS # BLD AUTO: 6.89 K/UL — SIGNIFICANT CHANGE UP (ref 1.8–7.4)
NEUTROPHILS NFR BLD AUTO: 85.1 % — HIGH (ref 43–77)
NRBC # BLD: 0 /100 WBCS — SIGNIFICANT CHANGE UP (ref 0–0)
PHOSPHATE SERPL-MCNC: 2.1 MG/DL — LOW (ref 2.5–4.5)
PLATELET # BLD AUTO: 228 K/UL — SIGNIFICANT CHANGE UP (ref 150–400)
POTASSIUM SERPL-MCNC: 4.2 MMOL/L — SIGNIFICANT CHANGE UP (ref 3.5–5.3)
POTASSIUM SERPL-SCNC: 4.2 MMOL/L — SIGNIFICANT CHANGE UP (ref 3.5–5.3)
PROT SERPL-MCNC: 6.4 G/DL — SIGNIFICANT CHANGE UP (ref 6–8.3)
RBC # BLD: 3.77 M/UL — LOW (ref 3.8–5.2)
RBC # FLD: 15.3 % — HIGH (ref 10.3–14.5)
SODIUM SERPL-SCNC: 143 MMOL/L — SIGNIFICANT CHANGE UP (ref 135–145)
WBC # BLD: 8.1 K/UL — SIGNIFICANT CHANGE UP (ref 3.8–10.5)
WBC # FLD AUTO: 8.1 K/UL — SIGNIFICANT CHANGE UP (ref 3.8–10.5)

## 2021-06-18 PROCEDURE — 99291 CRITICAL CARE FIRST HOUR: CPT

## 2021-06-18 RX ORDER — HYDROMORPHONE HYDROCHLORIDE 2 MG/ML
1 INJECTION INTRAMUSCULAR; INTRAVENOUS; SUBCUTANEOUS ONCE
Refills: 0 | Status: DISCONTINUED | OUTPATIENT
Start: 2021-06-18 | End: 2021-06-18

## 2021-06-18 RX ORDER — SODIUM CHLORIDE 9 MG/ML
1000 INJECTION, SOLUTION INTRAVENOUS ONCE
Refills: 0 | Status: COMPLETED | OUTPATIENT
Start: 2021-06-18 | End: 2021-06-18

## 2021-06-18 RX ORDER — KETOROLAC TROMETHAMINE 30 MG/ML
30 SYRINGE (ML) INJECTION ONCE
Refills: 0 | Status: DISCONTINUED | OUTPATIENT
Start: 2021-06-18 | End: 2021-06-18

## 2021-06-18 RX ORDER — POTASSIUM PHOSPHATE, MONOBASIC POTASSIUM PHOSPHATE, DIBASIC 236; 224 MG/ML; MG/ML
15 INJECTION, SOLUTION INTRAVENOUS ONCE
Refills: 0 | Status: COMPLETED | OUTPATIENT
Start: 2021-06-18 | End: 2021-06-18

## 2021-06-18 RX ORDER — ENOXAPARIN SODIUM 100 MG/ML
40 INJECTION SUBCUTANEOUS DAILY
Refills: 0 | Status: DISCONTINUED | OUTPATIENT
Start: 2021-06-18 | End: 2021-06-23

## 2021-06-18 RX ADMIN — Medication 1 PUFF(S): at 07:55

## 2021-06-18 RX ADMIN — MEROPENEM 100 MILLIGRAM(S): 1 INJECTION INTRAVENOUS at 22:13

## 2021-06-18 RX ADMIN — Medication 1 GRAM(S): at 18:14

## 2021-06-18 RX ADMIN — Medication 81 MILLIGRAM(S): at 12:33

## 2021-06-18 RX ADMIN — PANTOPRAZOLE SODIUM 40 MILLIGRAM(S): 20 TABLET, DELAYED RELEASE ORAL at 05:24

## 2021-06-18 RX ADMIN — MEROPENEM 100 MILLIGRAM(S): 1 INJECTION INTRAVENOUS at 05:24

## 2021-06-18 RX ADMIN — HYDROMORPHONE HYDROCHLORIDE 1 MILLIGRAM(S): 2 INJECTION INTRAMUSCULAR; INTRAVENOUS; SUBCUTANEOUS at 16:55

## 2021-06-18 RX ADMIN — POTASSIUM PHOSPHATE, MONOBASIC POTASSIUM PHOSPHATE, DIBASIC 62.5 MILLIMOLE(S): 236; 224 INJECTION, SOLUTION INTRAVENOUS at 11:56

## 2021-06-18 RX ADMIN — Medication 1 GRAM(S): at 12:33

## 2021-06-18 RX ADMIN — Medication 1 GRAM(S): at 23:13

## 2021-06-18 RX ADMIN — ALBUTEROL 2 PUFF(S): 90 AEROSOL, METERED ORAL at 14:24

## 2021-06-18 RX ADMIN — ALBUTEROL 2 PUFF(S): 90 AEROSOL, METERED ORAL at 07:55

## 2021-06-18 RX ADMIN — Medication 2: at 12:53

## 2021-06-18 RX ADMIN — SODIUM CHLORIDE 1000 MILLILITER(S): 9 INJECTION, SOLUTION INTRAVENOUS at 18:15

## 2021-06-18 RX ADMIN — HEPARIN SODIUM 5000 UNIT(S): 5000 INJECTION INTRAVENOUS; SUBCUTANEOUS at 05:24

## 2021-06-18 RX ADMIN — INSULIN GLARGINE 20 UNIT(S): 100 INJECTION, SOLUTION SUBCUTANEOUS at 23:12

## 2021-06-18 RX ADMIN — Medication 1 PUFF(S): at 20:49

## 2021-06-18 RX ADMIN — Medication 1 MILLIGRAM(S): at 16:55

## 2021-06-18 RX ADMIN — CASPOFUNGIN ACETATE 260 MILLIGRAM(S): 7 INJECTION, POWDER, LYOPHILIZED, FOR SOLUTION INTRAVENOUS at 16:14

## 2021-06-18 RX ADMIN — Medication 650 MILLIGRAM(S): at 17:25

## 2021-06-18 RX ADMIN — ENOXAPARIN SODIUM 40 MILLIGRAM(S): 100 INJECTION SUBCUTANEOUS at 12:33

## 2021-06-18 RX ADMIN — ALBUTEROL 2 PUFF(S): 90 AEROSOL, METERED ORAL at 20:49

## 2021-06-18 RX ADMIN — Medication 1 PUFF(S): at 14:23

## 2021-06-18 RX ADMIN — Medication 5 MILLIGRAM(S): at 22:13

## 2021-06-18 RX ADMIN — Medication 30 MILLIGRAM(S): at 16:22

## 2021-06-18 RX ADMIN — FENTANYL CITRATE 1 PATCH: 50 INJECTION INTRAVENOUS at 07:00

## 2021-06-18 RX ADMIN — SENNA PLUS 2 TABLET(S): 8.6 TABLET ORAL at 22:13

## 2021-06-18 RX ADMIN — FENTANYL CITRATE 1 PATCH: 50 INJECTION INTRAVENOUS at 20:27

## 2021-06-18 RX ADMIN — Medication 1 GRAM(S): at 05:24

## 2021-06-18 RX ADMIN — CHLORHEXIDINE GLUCONATE 1 APPLICATION(S): 213 SOLUTION TOPICAL at 05:24

## 2021-06-18 RX ADMIN — Medication 2: at 18:13

## 2021-06-18 RX ADMIN — Medication 650 MILLIGRAM(S): at 16:55

## 2021-06-18 RX ADMIN — HYDROMORPHONE HYDROCHLORIDE 1 MILLIGRAM(S): 2 INJECTION INTRAMUSCULAR; INTRAVENOUS; SUBCUTANEOUS at 17:25

## 2021-06-18 RX ADMIN — Medication 1 PUFF(S): at 02:21

## 2021-06-18 RX ADMIN — ALBUTEROL 2 PUFF(S): 90 AEROSOL, METERED ORAL at 02:21

## 2021-06-18 RX ADMIN — MEROPENEM 100 MILLIGRAM(S): 1 INJECTION INTRAVENOUS at 14:00

## 2021-06-18 NOTE — PROGRESS NOTE ADULT - SUBJECTIVE AND OBJECTIVE BOX
Patient is a 75y Female whom presented to the hospital with ckd and manasa     PAST MEDICAL & SURGICAL HISTORY:  Dementia of frontal lobe type    Aphasic stroke    Diabetes mellitus    Respiratory failure    Hypertension    GERD (gastroesophageal reflux disease)    Constipation    Respiratory failure    CVA (cerebral vascular accident)    HTN (hypertension)    DM (diabetes mellitus)    Advanced dementia    COVID-19 virus detected    Quadriplegia    Pneumonia    Type II diabetes mellitus    Hx of appendectomy    Gastrostomy in place    Tracheostomy in place    Tracheostomy tube present    Feeding by G-tube        MEDICATIONS  (STANDING):  ALBUTerol    90 MICROgram(s) HFA Inhaler 2 Puff(s) Inhalation every 6 hours  aspirin  chewable 81 milliGRAM(s) Oral daily  chlorhexidine 2% Cloths 1 Application(s) Topical <User Schedule>  dextrose 40% Gel 15 Gram(s) Oral once  dextrose 5%. 1000 milliLiter(s) (50 mL/Hr) IV Continuous <Continuous>  dextrose 5%. 1000 milliLiter(s) (100 mL/Hr) IV Continuous <Continuous>  dextrose 50% Injectable 25 Gram(s) IV Push once  dextrose 50% Injectable 12.5 Gram(s) IV Push once  dextrose 50% Injectable 25 Gram(s) IV Push once  glucagon  Injectable 1 milliGRAM(s) IntraMuscular once  heparin   Injectable 5000 Unit(s) SubCutaneous every 8 hours  insulin glargine Injectable (LANTUS) 20 Unit(s) SubCutaneous at bedtime  insulin lispro (ADMELOG) corrective regimen sliding scale   SubCutaneous every 6 hours  ipratropium 17 MICROgram(s) HFA Inhaler 1 Puff(s) Inhalation every 6 hours  meropenem  IVPB      meropenem  IVPB 1000 milliGRAM(s) IV Intermittent every 12 hours  pantoprazole   Suspension 40 milliGRAM(s) Oral before breakfast  polyethylene glycol 3350 17 Gram(s) Oral daily  senna 2 Tablet(s) Oral at bedtime      Allergies    codeine (Hives)    Intolerances        SOCIAL HISTORY:  Denies ETOh,Smoking,     FAMILY HISTORY:  No pertinent family history in first degree relatives        REVIEW OF SYSTEMS:    unable to obtained a good review system                                                     10.5   8.10  )-----------( 228      ( 18 Jun 2021 05:30 )             34.0       CBC Full  -  ( 18 Jun 2021 05:30 )  WBC Count : 8.10 K/uL  RBC Count : 3.77 M/uL  Hemoglobin : 10.5 g/dL  Hematocrit : 34.0 %  Platelet Count - Automated : 228 K/uL  Mean Cell Volume : 90.2 fl  Mean Cell Hemoglobin : 27.9 pg  Mean Cell Hemoglobin Concentration : 30.9 gm/dL  Auto Neutrophil # : 6.89 K/uL  Auto Lymphocyte # : 0.52 K/uL  Auto Monocyte # : 0.34 K/uL  Auto Eosinophil # : 0.19 K/uL  Auto Basophil # : 0.05 K/uL  Auto Neutrophil % : 85.1 %  Auto Lymphocyte % : 6.4 %  Auto Monocyte % : 4.2 %  Auto Eosinophil % : 2.3 %  Auto Basophil % : 0.6 %      06-18    143  |  112<H>  |  24<H>  ----------------------------<  143<H>  4.2   |  29  |  0.83    Ca    8.5      18 Jun 2021 05:30  Phos  2.1     06-18  Mg     2.5     06-18    TPro  6.4  /  Alb  2.5<L>  /  TBili  0.4  /  DBili  x   /  AST  42<H>  /  ALT  32  /  AlkPhos  89  06-18      CAPILLARY BLOOD GLUCOSE      POCT Blood Glucose.: 192 mg/dL (18 Jun 2021 17:46)  POCT Blood Glucose.: 156 mg/dL (18 Jun 2021 12:48)  POCT Blood Glucose.: 119 mg/dL (18 Jun 2021 05:23)  POCT Blood Glucose.: 133 mg/dL (17 Jun 2021 23:06)      Vital Signs Last 24 Hrs  T(C): 36.7 (18 Jun 2021 19:00), Max: 37.6 (18 Jun 2021 16:00)  T(F): 98.1 (18 Jun 2021 19:00), Max: 99.7 (18 Jun 2021 16:00)  HR: 66 (18 Jun 2021 20:00) (59 - 140)  BP: 95/52 (18 Jun 2021 20:00) (57/32 - 166/81)  BP(mean): 69 (18 Jun 2021 20:00) (39 - 116)  RR: 15 (18 Jun 2021 20:00) (10 - 47)  SpO2: 100% (18 Jun 2021 20:00) (94% - 100%)                          PHYSICAL EXAM:    Constitutional: NAD  HEENT: conjunctive   clear   Neck:  No JVD  Respiratory: decrease bs b/l , pos trach   Cardiovascular: S1 and S2  Gastrointestinal: BS+, soft, pos peg   Extremities: No peripheral edema

## 2021-06-18 NOTE — PROGRESS NOTE ADULT - ASSESSMENT
74 yo Female ,[facility] resident with hx of  PMH Frontotemporal dementia, CVA, chronic vent dependent respiratory failure, dysphagia s/p PEG, recurrent UTI/VAP presents with septic shock from [facility] facility. She was last admitted at [facility] 10/ 2020 with proteus UTI ,sepsis ,hypernatremia and RYAN .Pseudomonas grew in sputum resistant to zosyn last time Admitted for septic workup and evaluation,send blood and urine cx,serial lactate levels,monitor vitals closley,ivfs hydration,monitor urine output and renal profile,iv abx initiated e. On arrival to ER found to be febrile to 102, WBC 20k, lactate 6.7. CXR with haziness of entire right side, urine appears cloudy. Med hx is significant for Advanced dementia ,s/p  Aphasic stroke , Constipation  ,COVID-19   ,CVA (cerebral vascular accident)  ,Dementia of frontal lobe type  ,Diabetes mellitus  ,DM (diabetes mellitus)type  2   ,GERD (gastroesophageal reflux disease)  ,HTN (hypertension)  ,Hypertension  ,Pneumonia  ,Quadriplegia  ,Respiratory failure ,s/p tracheostomy and peg Palliative care consult requested ,to discuss advance directives and complete /update  MOLST  (15 Zay 2021 07:06)      ACUTE RENAL FAILURE: hypernatremia increase water intake   Serum creatinine is 1.1   There is no progression . No uremic symptoms  pos  evidence of anemia .  Fluid status stable.  Will continue to avoid nephrotoxic drugs.  pantoprazole   Suspension 40 milliGRAM(s) Oral before breakfast    Admit for septic workup and ID evaluation,send blood and urine cx,serial lactate levels,monitor vitals closley,ivfs hydration,monitor urine output and renal profile  meropenem  IVPB 1000 milliGRAM(s) IV Intermittent every 12 hours

## 2021-06-18 NOTE — CHART NOTE - NSCHARTNOTEFT_GEN_A_CORE
Assessment:  Pt seen for nutrition follow up.  Chart reviewed, hospital course noted.  74F PMH Frontotemporal dementia, CVA, chronic vent dependent respiratory failure, dysphagia s/p PEG, recurrent UTI/VAP presents with septic shock from [facility] facility. Source most likely urine, r/o PNA as well.     Pt seen at bedside, asleep, nonverbal.  Feeds running at goal rate of 50cc/hr and tolerated.  No residuals noted.  +loose stool noted 6/18.  Hold bowel regimen if loose stool recurrent.  Low Phos noted- replace if remains low.     Factors impacting intake: [ ] none [ ] nausea  [ ] vomiting [ ] diarrhea [ ] constipation  [ ]chewing problems [x] swallowing issues  [x] other: Trache/PEG    Diet Presciption: Diet, NPO with Tube Feed:   Tube Feeding Modality: Gastrostomy  Glucerna 1.5  Total Volume for 24 Hours (mL): 1000  Continuous  Starting Tube Feed Rate {mL per Hour}: 30  Increase Tube Feed Rate by (mL): 10     Every 6 hours  Until Goal Tube Feed Rate (mL per Hour): 50  Tube Feed Duration (in Hours): 20  Tube Feed Start Time: 12:00 (06-15-21 @ 11:24)    Intake:  Tf pump study done:  received 932cc (93%) of Rx amount x 24 hrs and 1880cc (94%) of Rx'd amount x 48 hrs    Current Weight: Weight (kg): 53 (06-15), 6/16 125.2#  % Weight Change    Pertinent Medications: MEDICATIONS  (STANDING):  ALBUTerol    90 MICROgram(s) HFA Inhaler 2 Puff(s) Inhalation every 6 hours  aspirin  chewable 81 milliGRAM(s) Oral daily  bisacodyl 5 milliGRAM(s) Oral at bedtime  caspofungin IVPB      caspofungin IVPB 50 milliGRAM(s) IV Intermittent every 24 hours  chlorhexidine 2% Cloths 1 Application(s) Topical <User Schedule>  dextrose 40% Gel 15 Gram(s) Oral once  dextrose 5%. 1000 milliLiter(s) (50 mL/Hr) IV Continuous <Continuous>  dextrose 5%. 1000 milliLiter(s) (100 mL/Hr) IV Continuous <Continuous>  dextrose 50% Injectable 25 Gram(s) IV Push once  dextrose 50% Injectable 12.5 Gram(s) IV Push once  dextrose 50% Injectable 25 Gram(s) IV Push once  enoxaparin Injectable 40 milliGRAM(s) SubCutaneous daily  fentaNYL   Patch  12 MICROgram(s)/Hr 1 Patch Transdermal every 72 hours  glucagon  Injectable 1 milliGRAM(s) IntraMuscular once  insulin glargine Injectable (LANTUS) 20 Unit(s) SubCutaneous at bedtime  insulin lispro (ADMELOG) corrective regimen sliding scale   SubCutaneous every 6 hours  ipratropium 17 MICROgram(s) HFA Inhaler 1 Puff(s) Inhalation every 6 hours  meropenem  IVPB 1000 milliGRAM(s) IV Intermittent every 8 hours  pantoprazole   Suspension 40 milliGRAM(s) Oral before breakfast  polyethylene glycol 3350 17 Gram(s) Oral daily  senna 2 Tablet(s) Oral at bedtime  sucralfate suspension 1 Gram(s) Oral every 6 hours    MEDICATIONS  (PRN):  acetaminophen    Suspension .. 650 milliGRAM(s) Oral every 6 hours PRN Temp greater or equal to 38C (100.4F)    Pertinent Labs: 06-18 Na143 mmol/L Glu 143 mg/dL<H> K+ 4.2 mmol/L Cr  0.83 mg/dL BUN 24 mg/dL<H> 06-18 Phos 2.1 mg/dL<L> 06-18 Alb 2.5 g/dL<L>     CAPILLARY BLOOD GLUCOSE      POCT Blood Glucose.: 156 mg/dL (18 Jun 2021 12:48)  POCT Blood Glucose.: 119 mg/dL (18 Jun 2021 05:23)  POCT Blood Glucose.: 133 mg/dL (17 Jun 2021 23:06)  POCT Blood Glucose.: 137 mg/dL (17 Jun 2021 17:26)    Skin: R heel and sacral I  Edema: none noted    Estimated Needs:   [x] no change since previous assessment  [ ] recalculated:     Previous Nutrition Diagnosis:   [x] Swallowing Difficulty     Nutrition Diagnosis is [x] ongoing  [ ] resolved [ ] not applicable     New Nutrition Diagnosis: [x] not applicable       Interventions:   Recommend  [ ] Change Diet To:  [ ] Nutrition Supplement  [x] Nutrition Support- continue TF Glucerna 1.5 at  50ml x 20hrs for a total volume of 1,000ml/day, provides 1,500 kcal/day, 83 gram protein/day, 759ml/ day of water.  Free water as per MD 250cc Q6h.  [x] Other:   1) Monitor pt's tolerance to TF, 2) Recommend MVI/daily, 3) Monitor pt's weight, skin, edema, GI distress    Monitoring and Evaluation:   [ ] PO intake [ x ] Tolerance to diet prescription [ x ] weights [ x ] labs[ x ] follow up per protocol  [ ] other:

## 2021-06-18 NOTE — PROGRESS NOTE ADULT - ASSESSMENT
PARASHARAMI BREA 75 f [facility] P 6/15/2021   DR ILIANA KEMP      REVIEW OF SYMPTOMS      Able to give (reliable) ROS  NO     PHYSICAL EXAM    HEENT Unremarkable  atraumatic   RESP Fair air entry EXP prolonged    Harsh breath sound Resp distres mild   CARDIAC S1 S2 No S3     NO JVD    ABDOMEN SOFT BS PRESENT NOT DISTENDED No hepatosplenomegaly   PEDAL EDEMA present No calf tenderness  NO rash     PARASHARAMI BREA 75 f [facility] P 6/15/2021 ADMISSION PROBLEMS    COVID STATUS   scv2 6/15/2021 (-)   spkab 6/16 (+)     SEPTIC SHOCK poa  resolvd 6/16/2021     VAP poa  SIGMOID COLITIS 6/15/2021 CTAP    CANDIDUIA 6/17/2021   CASPOFUNGIN 6/17/2021     COPD   ANEMIA   HYPERNATREMIA r  FREE WATER 6/17/2021   RYAN resolvd 6/16/2021     PMH VENT DEPENDENT   PMH TRACH  PMH PEG   PMH UTI VAP   PMH ZOSYN RESISTANT PSEUDOMONAS   PMH DEMENTIA   PMH CVA .    GLOBAL AND BEST PRACTICE ISSUES                     ALLERGY.    CODEINE   WEIGHT.     6/15/2021 53                         BMI               6/15/2021 20.                                         .                          ADVANCED DIRECTIVE.                               HEAD OF BED ELEVATION. Yes  DVT PROPHYLAXIS.   HPSC (6/15/2021)   -> lvnx 40 96/18/2021)                  SQUIRES PROPHYLAXIS.       PROTONIX 40 (6/15/2021)   APA.      ASA 81 (6/15/2021)                                                               DYSPHAGIA RECOMM.   DIET.    GLUCERNA 1.5 1000 (6/15/2021)   INFECTION PROPHYLAXIS.   CHLORHEXIDINE 2% (6/15/2021)   FREE WATER.    250.6 (6/17/2021)     PATIENT DATA                ABG.     6/15/2021 12 40% /400/5/14 742/34/166              OXYGENATION.                   VITALS/IO/VENT/DRIPS.   6/18/2021 99f 100 150/90  6/18/2021 14/400/5/.25      ASSESSMENT/RECOMMENDATIONS.    SEPTIC SHOCK poa   target map 65 (+)     VAP poa  SIGMOID COLITIS 6/15/2021 CTAP   FUNGURIA 6/15    w 6/14-6/15-6/16-5/16 w 24-12 - 11.2-11.3    ua 6/15/2021 w 6-10 nitr (-) l estr mod   ct cap nc 6/15/2021 mild sigmoid colitis patchy opac lower lobes possibly aspiration   rvp 6/15/2021 rvp (-)  sp sm 6/15 rare gnr   mrsa 6/15 (-)   urine 6/15 50-99 c  blod c 6/15 (-)    MEROPENEM 1.2 (6/15/2021)   Complete 8 d course   CAPSO 6/17/2021       VENT MANAGEMENT   VENT BUNDLE Target pH 730 (+) PO2 60 (+) po 90-95% Pplat 35 (-)   HOBE DSV DSBT Restrictive fluid strategy   Gas exchange good on 6/15/2021 12 a     COPD   PROV HFA (6/15/2021)   ATRIV (6/15/2021)   under control    RO MI  Tr 6/15/2021 Tr (-)   mi ruled out     ANEMIA   Hb 6/146/15-6/16-6/18 Hb 12.3- 9.7-10.2 -10.5    target hb 7 (+)     HYPERNATREMIA   Na 6/15-6/16-6/17-6/18/2021 Na 147 -145-148-143   monitor    RYAN  cr 6/14-6/15-6/16 Cr 1.7-1.3-1.1   improvd       OVERALL PLAN.  VAP/SIGMOID COLITIS poa BSAB   CANDIDURIA capsofung 6/17/2021   VENT MANAGMNT   ANEMIA Monitor  RENAL Monitor     TIME SPENT   Over 25 minutes aggregate care time spent on encounter; activities included   direct patient care, counseling and/or coordinating care reviewing notes, lab data/ imaging , discussion with multidisciplinary team/ patient  /family and explaining in detail risks, benefits, alternatives  of the recommendations     CHAPINCITO GUIDRY 75 f [facility] P 6/15/2021   DR ILIANA KEMP

## 2021-06-18 NOTE — PROGRESS NOTE ADULT - SUBJECTIVE AND OBJECTIVE BOX
PROGRESS NOTE  Patient is a 75y old  Female who presents with a chief complaint of fever and dyspnea (18 Jun 2021 09:07)  Chart and available morning labs /imaging are reviewed electronically , urgent issues addressed . More information  is being added upon completion of rounds , when more information is collected and management discussed with consultants , medical staff and social service/case management on the floor   OVERNIGHT No new issues reported by medical staff . All above noted   Rigors yesterday evening. Afebrile since overnight, last temp yesterday 102 @10AM. Vitals stable. Urine cultures growing Candida, Caspo started   Patient is resting in a bed comfortably . .No distress noted   HPI:  76 yo Female ,[facility] resident with hx of  PMH Frontotemporal dementia, CVA, chronic vent dependent respiratory failure, dysphagia s/p PEG, recurrent UTI/VAP presents with septic shock from [facility] facility. She was last admitted at [facility] 10/ 2020 with proteus UTI ,sepsis ,hypernatremia and RYAN .Pseudomonas grew in sputum resistant to zosyn last time Admitted for septic workup and evaluation,send blood and urine cx,serial lactate levels,monitor vitals closley,ivfs hydration,monitor urine output and renal profile,iv abx initiated e. On arrival to ER found to be febrile to 102, WBC 20k, lactate 6.7. CXR with haziness of entire right side, urine appears cloudy. Med hx is significant for Advanced dementia ,s/p  Aphasic stroke , Constipation  ,COVID-19   ,CVA (cerebral vascular accident)  ,Dementia of frontal lobe type  ,Diabetes mellitus  ,DM (diabetes mellitus)type  2   ,GERD (gastroesophageal reflux disease)  ,HTN (hypertension)  ,Hypertension  ,Pneumonia  ,Quadriplegia  ,Respiratory failure ,s/p tracheostomy and peg Palliative care consult requested ,to discuss advance directives and complete /update  MOLST  (15 Zay 2021 07:06)    PAST MEDICAL & SURGICAL HISTORY:  Dementia of frontal lobe type    Aphasic stroke    Diabetes mellitus    Respiratory failure    Hypertension    GERD (gastroesophageal reflux disease)    Constipation    Respiratory failure    CVA (cerebral vascular accident)    HTN (hypertension)    DM (diabetes mellitus)    Advanced dementia    COVID-19 virus detected    Quadriplegia    Pneumonia    Type II diabetes mellitus    Hx of appendectomy    Gastrostomy in place    Tracheostomy in place    Tracheostomy tube present    Feeding by G-tube        MEDICATIONS  (STANDING):  ALBUTerol    90 MICROgram(s) HFA Inhaler 2 Puff(s) Inhalation every 6 hours  aspirin  chewable 81 milliGRAM(s) Oral daily  bisacodyl 5 milliGRAM(s) Oral at bedtime  caspofungin IVPB      caspofungin IVPB 50 milliGRAM(s) IV Intermittent every 24 hours  chlorhexidine 2% Cloths 1 Application(s) Topical <User Schedule>  dextrose 40% Gel 15 Gram(s) Oral once  dextrose 5%. 1000 milliLiter(s) (50 mL/Hr) IV Continuous <Continuous>  dextrose 5%. 1000 milliLiter(s) (100 mL/Hr) IV Continuous <Continuous>  dextrose 50% Injectable 25 Gram(s) IV Push once  dextrose 50% Injectable 12.5 Gram(s) IV Push once  dextrose 50% Injectable 25 Gram(s) IV Push once  enoxaparin Injectable 40 milliGRAM(s) SubCutaneous daily  fentaNYL   Patch  12 MICROgram(s)/Hr 1 Patch Transdermal every 72 hours  glucagon  Injectable 1 milliGRAM(s) IntraMuscular once  insulin glargine Injectable (LANTUS) 20 Unit(s) SubCutaneous at bedtime  insulin lispro (ADMELOG) corrective regimen sliding scale   SubCutaneous every 6 hours  ipratropium 17 MICROgram(s) HFA Inhaler 1 Puff(s) Inhalation every 6 hours  meropenem  IVPB 1000 milliGRAM(s) IV Intermittent every 8 hours  pantoprazole   Suspension 40 milliGRAM(s) Oral before breakfast  polyethylene glycol 3350 17 Gram(s) Oral daily  senna 2 Tablet(s) Oral at bedtime  sucralfate suspension 1 Gram(s) Oral every 6 hours    MEDICATIONS  (PRN):  acetaminophen    Suspension .. 650 milliGRAM(s) Oral every 6 hours PRN Temp greater or equal to 38C (100.4F)      OBJECTIVE    T(C): 36.8 (06-18-21 @ 12:00), Max: 38.4 (06-17-21 @ 20:29)  HR: 73 (06-18-21 @ 12:00) (59 - 114)  BP: 130/60 (06-18-21 @ 12:00) (66/28 - 177/73)  RR: 19 (06-18-21 @ 12:00) (10 - 29)  SpO2: 100% (06-18-21 @ 12:00) (86% - 100%)  Wt(kg): --  I&O's Summary    17 Jun 2021 07:01  -  18 Jun 2021 07:00  --------------------------------------------------------  IN: 4760 mL / OUT: 600 mL / NET: 4160 mL    18 Jun 2021 07:01  -  18 Jun 2021 12:22  --------------------------------------------------------  IN: 750 mL / OUT: 0 mL / NET: 750 mL          REVIEW OF SYSTEMS:  Patient is  unable to provide any information/ROS  due to baseline mental status.     PHYSICAL EXAM: trach /vent  Appearance: NAD. VS past 24 hrs -as above   HEENT:   Moist oral mucosa. Conjunctiva clear b/l.   Neck : supple  Respiratory: Lungs CTAB.  Gastrointestinal:  Soft, nontender. No rebound. No rigidity. BS present	peg  Cardiovascular: RRR ,S1S2 present  Neurologic: lethargy  Extremities: No edema. No erythema. No calf tenderness.  Skin: No rashes, No ecchymoses, No cyanosis.	  wounds ,skin lesions-See skin assesment flow sheet   LABS:                        10.5   8.10  )-----------( 228      ( 18 Jun 2021 05:30 )             34.0     06-18    143  |  112<H>  |  24<H>  ----------------------------<  143<H>  4.2   |  29  |  0.83    Ca    8.5      18 Jun 2021 05:30  Phos  2.1     06-18  Mg     2.5     06-18    TPro  6.4  /  Alb  2.5<L>  /  TBili  0.4  /  DBili  x   /  AST  42<H>  /  ALT  32  /  AlkPhos  89  06-18    CAPILLARY BLOOD GLUCOSE      POCT Blood Glucose.: 119 mg/dL (18 Jun 2021 05:23)  POCT Blood Glucose.: 133 mg/dL (17 Jun 2021 23:06)  POCT Blood Glucose.: 137 mg/dL (17 Jun 2021 17:26)          Culture - Urine (collected 15 Zay 2021 16:42)  Source: .Urine Catheterized  Final Report (16 Jun 2021 21:47):    50,000 - 99,000 CFU/mL Presumptive Candida albicans    "Susceptibilities not performed"    Culture - Sputum (collected 15 Zay 2021 16:28)  Source: .Sputum Sputum  Gram Stain (15 Zay 2021 18:57):    Few polymorphonuclear leukocytes per low power field    Few Squamous epithelial cells per low power field    Rare Gram Negative Rods per oil power field  Final Report (17 Jun 2021 17:45):    Normal Respiratory Elvira present  Culture - Blood (collected 15 Zay 2021 04:41)  Source: .Blood Blood-Peripheral  Preliminary Report (16 Jun 2021 05:01):    No growth to date.  Culture - Blood (collected 15 Zay 2021 04:41)  Source: .Blood Blood-Peripheral  Preliminary Report (16 Jun 2021 05:01):    No growth to date.  RADIOLOGY & ADDITIONAL TESTS:   reviewed electronically  ASSESSMENT/PLAN:   25minutes spent on this visit, 50% visit time spent in care co-ordination with other attendings and counselling patient

## 2021-06-18 NOTE — PROGRESS NOTE ADULT - ASSESSMENT
74 yo female hx of aphasic stroke, covid 19, trach/vented, dm BIBEMS from [facility] for respiratory distress, found to have fever here 101.8  sepsis eval in progress  on ABX - cx in progress - on Caspo and Meropenem -   supportive ICU measures  vent support  not a candidate for weaning  spoke with   pt is full code  pain rx regimen - Fentanyl - Opioids -

## 2021-06-18 NOTE — PROGRESS NOTE ADULT - SUBJECTIVE AND OBJECTIVE BOX
Date/Time Patient Seen:  		  Referring MD:   Data Reviewed	       Patient is a 75y old  Female who presents with a chief complaint of fever and dyspnea (17 Jun 2021 22:46)      Subjective/HPI     PAST MEDICAL & SURGICAL HISTORY:  Dementia of frontal lobe type    Aphasic stroke    Diabetes mellitus    Respiratory failure    Hypertension    GERD (gastroesophageal reflux disease)    Constipation    Respiratory failure    CVA (cerebral vascular accident)    HTN (hypertension)    DM (diabetes mellitus)    Advanced dementia    COVID-19 virus detected    Quadriplegia    Pneumonia    Type II diabetes mellitus    Hx of appendectomy    Gastrostomy in place    Tracheostomy in place    Tracheostomy tube present    Feeding by G-tube          Medication list         MEDICATIONS  (STANDING):  ALBUTerol    90 MICROgram(s) HFA Inhaler 2 Puff(s) Inhalation every 6 hours  aspirin  chewable 81 milliGRAM(s) Oral daily  bisacodyl 5 milliGRAM(s) Oral at bedtime  caspofungin IVPB      caspofungin IVPB 50 milliGRAM(s) IV Intermittent every 24 hours  chlorhexidine 2% Cloths 1 Application(s) Topical <User Schedule>  dextrose 40% Gel 15 Gram(s) Oral once  dextrose 5%. 1000 milliLiter(s) (100 mL/Hr) IV Continuous <Continuous>  dextrose 5%. 1000 milliLiter(s) (50 mL/Hr) IV Continuous <Continuous>  dextrose 50% Injectable 25 Gram(s) IV Push once  dextrose 50% Injectable 12.5 Gram(s) IV Push once  dextrose 50% Injectable 25 Gram(s) IV Push once  fentaNYL   Patch  12 MICROgram(s)/Hr 1 Patch Transdermal every 72 hours  glucagon  Injectable 1 milliGRAM(s) IntraMuscular once  heparin   Injectable 5000 Unit(s) SubCutaneous every 8 hours  insulin glargine Injectable (LANTUS) 20 Unit(s) SubCutaneous at bedtime  insulin lispro (ADMELOG) corrective regimen sliding scale   SubCutaneous every 6 hours  ipratropium 17 MICROgram(s) HFA Inhaler 1 Puff(s) Inhalation every 6 hours  lactated ringers. 500 milliLiter(s) (999 mL/Hr) IV Continuous <Continuous>  meropenem  IVPB 1000 milliGRAM(s) IV Intermittent every 8 hours  pantoprazole   Suspension 40 milliGRAM(s) Oral before breakfast  polyethylene glycol 3350 17 Gram(s) Oral daily  senna 2 Tablet(s) Oral at bedtime  sucralfate suspension 1 Gram(s) Oral every 6 hours    MEDICATIONS  (PRN):  acetaminophen    Suspension .. 650 milliGRAM(s) Oral every 6 hours PRN Temp greater or equal to 38C (100.4F)         Vitals log        ICU Vital Signs Last 24 Hrs  T(C): 37.3 (18 Jun 2021 00:02), Max: 38.4 (17 Jun 2021 20:29)  T(F): 99.1 (18 Jun 2021 00:02), Max: 101.1 (17 Jun 2021 20:29)  HR: 59 (18 Jun 2021 05:18) (59 - 114)  BP: 147/65 (18 Jun 2021 04:00) (66/28 - 177/73)  BP(mean): 93 (18 Jun 2021 04:00) (41 - 105)  ABP: --  ABP(mean): --  RR: 14 (18 Jun 2021 04:00) (12 - 29)  SpO2: 100% (18 Jun 2021 05:18) (86% - 100%)       Mode: AC/ CMV (Assist Control/ Continuous Mandatory Ventilation)  RR (machine): 14  TV (machine): 400  FiO2: 25  PEEP: 5  ITime: 1  MAP: 9  PIP: 25      Input and Output:  I&O's Detail    16 Jun 2021 07:01  -  17 Jun 2021 07:00  --------------------------------------------------------  IN:    Enteral Tube Flush: 100 mL    Free Water: 1000 mL    Glucerna 1.5: 1000 mL    IV PiggyBack: 50 mL  Total IN: 2150 mL    OUT:    Indwelling Catheter - Urethral (mL): 250 mL    Voided (mL): 400 mL  Total OUT: 650 mL    Total NET: 1500 mL      17 Jun 2021 07:01  -  18 Jun 2021 06:12  --------------------------------------------------------  IN:    Enteral Tube Flush: 160 mL    Free Water: 1500 mL    Glucerna 1.5: 900 mL    IV PiggyBack: 100 mL    IV PiggyBack: 250 mL    IV PiggyBack: 100 mL    Lactated Ringers: 1000 mL    Lactated Ringers Bolus: 500 mL  Total IN: 4510 mL    OUT:    Voided (mL): 300 mL  Total OUT: 300 mL    Total NET: 4210 mL          Lab Data                        10.5   8.10  )-----------( 228      ( 18 Jun 2021 05:30 )             34.0     06-18    143  |  112<H>  |  24<H>  ----------------------------<  143<H>  4.2   |  29  |  0.83    Ca    8.5      18 Jun 2021 05:30  Phos  2.1     06-18  Mg     2.5     06-18    TPro  6.4  /  Alb  2.5<L>  /  TBili  0.4  /  DBili  x   /  AST  42<H>  /  ALT  32  /  AlkPhos  89  06-18            Review of Systems	      Objective     Physical Examination    heart s1s2  lung dc BS  abd soft  ventilated  trach  peg      Pertinent Lab findings & Imaging      Annalise:  NO   Adequate UO     I&O's Detail    16 Jun 2021 07:01  -  17 Jun 2021 07:00  --------------------------------------------------------  IN:    Enteral Tube Flush: 100 mL    Free Water: 1000 mL    Glucerna 1.5: 1000 mL    IV PiggyBack: 50 mL  Total IN: 2150 mL    OUT:    Indwelling Catheter - Urethral (mL): 250 mL    Voided (mL): 400 mL  Total OUT: 650 mL    Total NET: 1500 mL      17 Jun 2021 07:01  -  18 Jun 2021 06:12  --------------------------------------------------------  IN:    Enteral Tube Flush: 160 mL    Free Water: 1500 mL    Glucerna 1.5: 900 mL    IV PiggyBack: 100 mL    IV PiggyBack: 250 mL    IV PiggyBack: 100 mL    Lactated Ringers: 1000 mL    Lactated Ringers Bolus: 500 mL  Total IN: 4510 mL    OUT:    Voided (mL): 300 mL  Total OUT: 300 mL    Total NET: 4210 mL               Discussed with:     Cultures:	        Radiology

## 2021-06-18 NOTE — PROGRESS NOTE ADULT - SUBJECTIVE AND OBJECTIVE BOX
[facility], Division of Infectious Diseases  MOIZ Lora Y. Patel, S. Shah  [phone]    Name: BREA BECKHAM  Age: 75y  Gender: Female  MRN: [id number]    Interval History:  Patient seen and examined at bedside this morning  No acute overnight events. Febrile Tm 101.1F overnight  Continues to remain on vent.     Notes reviewed    Antibiotics:  caspofungin IVPB      caspofungin IVPB 50 milliGRAM(s) IV Intermittent every 24 hours  meropenem  IVPB 1000 milliGRAM(s) IV Intermittent every 8 hours      Medications:  acetaminophen    Suspension .. 650 milliGRAM(s) Oral every 6 hours PRN  ALBUTerol    90 MICROgram(s) HFA Inhaler 2 Puff(s) Inhalation every 6 hours  aspirin  chewable 81 milliGRAM(s) Oral daily  bisacodyl 5 milliGRAM(s) Oral at bedtime  caspofungin IVPB      caspofungin IVPB 50 milliGRAM(s) IV Intermittent every 24 hours  chlorhexidine 2% Cloths 1 Application(s) Topical <User Schedule>  dextrose 40% Gel 15 Gram(s) Oral once  dextrose 5%. 1000 milliLiter(s) IV Continuous <Continuous>  dextrose 5%. 1000 milliLiter(s) IV Continuous <Continuous>  dextrose 50% Injectable 25 Gram(s) IV Push once  dextrose 50% Injectable 12.5 Gram(s) IV Push once  dextrose 50% Injectable 25 Gram(s) IV Push once  fentaNYL   Patch  12 MICROgram(s)/Hr 1 Patch Transdermal every 72 hours  glucagon  Injectable 1 milliGRAM(s) IntraMuscular once  heparin   Injectable 5000 Unit(s) SubCutaneous every 8 hours  insulin glargine Injectable (LANTUS) 20 Unit(s) SubCutaneous at bedtime  insulin lispro (ADMELOG) corrective regimen sliding scale   SubCutaneous every 6 hours  ipratropium 17 MICROgram(s) HFA Inhaler 1 Puff(s) Inhalation every 6 hours  meropenem  IVPB 1000 milliGRAM(s) IV Intermittent every 8 hours  pantoprazole   Suspension 40 milliGRAM(s) Oral before breakfast  polyethylene glycol 3350 17 Gram(s) Oral daily  potassium phosphate IVPB 15 milliMole(s) IV Intermittent once  senna 2 Tablet(s) Oral at bedtime  sucralfate suspension 1 Gram(s) Oral every 6 hours      Review of Systems:  unable to obtain    Allergies: codeine (Hives)    For details regarding the patient's past medical history, social history, family history, and other miscellaneous elements, please refer the initial infectious diseases consultation and/or the admitting history and physical examination for this admission.    Objective:  Vitals:   T(C): 37.3 (06-18-21 @ 00:02), Max: 38.4 (06-17-21 @ 20:29)  HR: 71 (06-18-21 @ 08:00) (59 - 114)  BP: 129/60 (06-18-21 @ 08:00) (66/28 - 177/73)  RR: 20 (06-18-21 @ 08:00) (10 - 29)  SpO2: 100% (06-18-21 @ 08:00) (86% - 100%)    Physical Examination:  General: no acute distress  HEENT: NC/AT, multiple secretions from mouth  Neck: trach in place  Cardio: S1, S2 heard, RRR, no murmurs  Resp: MV breath sounds  Abd: soft, NT, ND  Ext: no edema or cyanosis  Skin: warm, dry, no visible rash      Laboratory Studies:  CBC:                       10.5   8.10  )-----------( 228      ( 18 Jun 2021 05:30 )             34.0     CMP: 06-18    143  |  112<H>  |  24<H>  ----------------------------<  143<H>  4.2   |  29  |  0.83    Ca    8.5      18 Jun 2021 05:30  Phos  2.1     06-18  Mg     2.5     06-18    TPro  6.4  /  Alb  2.5<L>  /  TBili  0.4  /  DBili  x   /  AST  42<H>  /  ALT  32  /  AlkPhos  89  06-18    LIVER FUNCTIONS - ( 18 Jun 2021 05:30 )  Alb: 2.5 g/dL / Pro: 6.4 g/dL / ALK PHOS: 89 U/L / ALT: 32 U/L / AST: 42 U/L / GGT: x               Microbiology: reviewed    Culture - Urine (collected 06-15-21 @ 16:42)  Source: .Urine Catheterized  Final Report (06-16-21 @ 21:47):    50,000 - 99,000 CFU/mL Presumptive Candida albicans    "Susceptibilities not performed"    Culture - Sputum (collected 06-15-21 @ 16:28)  Source: .Sputum Sputum  Gram Stain (06-15-21 @ 18:57):    Few polymorphonuclear leukocytes per low power field    Few Squamous epithelial cells per low power field    Rare Gram Negative Rods per oil power field  Final Report (06-17-21 @ 17:45):    Normal Respiratory Elvira present    Culture - Blood (collected 06-15-21 @ 04:41)  Source: .Blood Blood-Peripheral  Preliminary Report (06-16-21 @ 05:01):    No growth to date.    Culture - Blood (collected 06-15-21 @ 04:41)  Source: .Blood Blood-Peripheral  Preliminary Report (06-16-21 @ 05:01):    No growth to date.        Radiology: reviewed  < from: Xray Chest 1 View- PORTABLE-Urgent (Xray Chest 1 View- PORTABLE-Urgent .) (06.16.21 @ 00:47) >    EXAM:  XR CHEST PORTABLE URGENT 1V                            PROCEDURE DATE:  06/16/2021          INTERPRETATION:  Hypoxia short of breath.    AP chest. Prior 6/14/2021.    IMPRESSION:  Tracheostomy cannula reidentified in position. Low lung volumes. No change heart mediastinum. Patient's hand projects over the right base. Elevation right hemidiaphragm similar to prior. Patchy atelectasis/infiltrate at the lung bases bilaterally. Correlate clinically for infection. No pleural effusion or pneumothorax.            NA LIMA MD; Attending Radiologist  This document has been electronically signed. Jun 16 2021 11:41AM    < end of copied text >

## 2021-06-18 NOTE — PROGRESS NOTE ADULT - ASSESSMENT
Pt is a 75W from [facility], w/ PMHx of frontotemporal dementia, CVA, CHRF requiring vent, dysphagia s/p PEG, recurrent UTI/VAP p/w septic shock 2/2 UTI vs PNA.    RECOMMENDATIONS  Septic Shock 2/2 recurrent VAP vs recurrent UTI  Candiduria  Shock/Hypotension--resolved; off pressors  ARF--resolved  Leukocytosis--resolved  Prior micro with Pseudomonas aeruginosa -  Piperacillin/Tazobactam: R >64 in sputum,  Proteus mirabilis (Indole negative) in urine  BCx NGTD. RCx NOF. UCx +candida  MRSA screen negative  Trend temps/CBC  Pulmonary toilet  Appreciate ICU care  -c/w meropenem (6/15-)  -c/w caspofungin (added on 6/17-)  Duration TBD pending clinical improvement    Dr. Billy Valenzuela will be covering the service this weekend.   Infectious Diseases will continue to follow. Please call with any questions.   Andressa Brantley M.D.  [facility], Division of Infectious Diseases [phone]  For over the weekend and after hours, please call [phone]

## 2021-06-18 NOTE — PROGRESS NOTE ADULT - ATTENDING COMMENTS
75F PMH Frontotemporal dementia, CVA, chronic respiratory failure s/p trach, vent-dependent, bedbound, dysphagia s/p PEG, recurrent UTI/VAP presents with septic shock likely due to recurrent UTI vs. VAP, found to have Candida albicans UTI. Also with neutrophilic leukocytosis, RYAN, and lactic acidosis, now improved.    1. Neuro: mental status at baseline, remains awake and alert, but unresponsive. Had recurrent episode of tachypnea, tachycardia, and agitation with recurrent fever today s/p acetaminophen, ketorolac, lorazepam 1 mg IV, and hydromorphone IV. Continue fentanyl patch at 12 mcg  2. CV: HD stable, continue aspirin 81 mg daily. Has history of diastolic dysfunction, mild MR  3. Pulm: continue lung protective ventilation, on minimal FiO2 25% and PEEP 5. Sputum culture with normal respiratory alejandro. Continue albuterol-ipratropium  4. GI: continue Glucerna tube feeds. Continue PPI and sucralfate. Bowel regimen with senna, bisacodyl, and miralax  5. Renal: RYAN and lactic acidosis resolved. Strict I/O's, trend kidney function and lytes  6. ID: C. albicans UTI vs. VAP, continue meropenem and caspofungin. Follow-up blood cultures and serum fungitell. Sputum culture with normal respiratory alejandro  7. Endo: DM2, continue insulin coverage scale and Lantus 20 qhs. A1C 8.5  8. Heme: DVT ppx with HSQ  9. Skin: no lines or vaughn  10. Dispo: full code, poor overall prognosis. Had long goals of care discussion with  Marciano and he understands patient's terminal condition, but is unable to agree for DNR at this time. Will discuss with his children  CC time spent: 35 min

## 2021-06-18 NOTE — PROGRESS NOTE ADULT - PROBLEM SELECTOR PLAN 1
2/2 to UTI with candida -caspofungin added 06/17 and   PNA - on meropenem .Off levaphed ,BP improving

## 2021-06-18 NOTE — PROGRESS NOTE ADULT - ASSESSMENT
74 yo Female ,[facility] resident with hx of  PMH Frontotemporal dementia, CVA, chronic vent dependent respiratory failure, dysphagia s/p PEG, recurrent UTI/VAP presents with septic shock from [facility] facility. She was last admitted at [facility] 10/ 2020 with proteus UTI ,sepsis ,hypernatremia and RYAN .Pseudomonas grew in sputum resistant to zosyn last time Admitted for septic workup and evaluation,send blood and urine cx,serial lactate levels,monitor vitals closley,ivfs hydration,monitor urine output and renal profile,iv abx initiated e. On arrival to ER found to be febrile to 102, WBC 20k, lactate 6.7. CXR with haziness of entire right side, urine appears cloudy. Med hx is significant for Advanced dementia ,s/p  Aphasic stroke , Constipation  ,COVID-19   ,CVA (cerebral vascular accident)  ,Dementia of frontal lobe type  ,Diabetes mellitus  ,DM (diabetes mellitus)  ,GERD (gastroesophageal reflux disease)  ,HTN (hypertension)  ,Hypertension  ,Pneumonia  ,Quadriplegia  ,Respiratory failure ,s/p tracheostomy and peg Palliative care consult requested ,to discuss advance directives and complete /update  [facility]

## 2021-06-18 NOTE — PROGRESS NOTE ADULT - SUBJECTIVE AND OBJECTIVE BOX
[facility]       HPI:  74 yo Female ,Critical access hospital resident with hx of  PMH Frontotemporal dementia, CVA, chronic vent dependent respiratory failure, dysphagia s/p PEG, recurrent UTI/VAP presents with septic shock from [facility] facility. She was [facility] with proteus UTI ,sepsis ,hypernatremia and RYAN .Pseudomonas grew in sputum resistant to zosyn last time Admitted for septic workup and evaluation,send blood and urine cx,serial lactate levels,monitor vitals closley,ivfs hydration,monitor urine output and renal profile,iv abx initiated e. On arrival to ER found to be febrile to 102, WBC 20k, lactate 6.7. CXR with haziness of entire right side, urine appears cloudy. Med hx is significant for Advanced dementia ,s/p  Aphasic stroke , Constipation  ,COVID-19   ,CVA (cerebral vascular accident)  ,Dementia of frontal lobe type  ,Diabetes mellitus  ,DM (diabetes mellitus)type  2   ,GERD (gastroesophageal reflux disease)  ,HTN (hypertension)  ,Hypertension  ,Pneumonia  ,Quadriplegia  ,Respiratory failure ,s/p tracheostomy and peg Palliative care consult requested ,to discuss advance directives and complete /update  MOLST  (15 Zay 2021 07:06)        SUBJECTIVE:      ALLERGIES:  Allergies    codeine (Hives)    Intolerances          MEDICATIONS  (STANDING):  ALBUTerol    90 MICROgram(s) HFA Inhaler 2 Puff(s) Inhalation every 6 hours  aspirin  chewable 81 milliGRAM(s) Oral daily  bisacodyl 5 milliGRAM(s) Oral at bedtime  caspofungin IVPB      caspofungin IVPB 50 milliGRAM(s) IV Intermittent every 24 hours  chlorhexidine 2% Cloths 1 Application(s) Topical <User Schedule>  dextrose 40% Gel 15 Gram(s) Oral once  dextrose 5%. 1000 milliLiter(s) (50 mL/Hr) IV Continuous <Continuous>  dextrose 5%. 1000 milliLiter(s) (100 mL/Hr) IV Continuous <Continuous>  dextrose 50% Injectable 25 Gram(s) IV Push once  dextrose 50% Injectable 12.5 Gram(s) IV Push once  dextrose 50% Injectable 25 Gram(s) IV Push once  enoxaparin Injectable 40 milliGRAM(s) SubCutaneous daily  fentaNYL   Patch  12 MICROgram(s)/Hr 1 Patch Transdermal every 72 hours  glucagon  Injectable 1 milliGRAM(s) IntraMuscular once  insulin glargine Injectable (LANTUS) 20 Unit(s) SubCutaneous at bedtime  insulin lispro (ADMELOG) corrective regimen sliding scale   SubCutaneous every 6 hours  ipratropium 17 MICROgram(s) HFA Inhaler 1 Puff(s) Inhalation every 6 hours  meropenem  IVPB 1000 milliGRAM(s) IV Intermittent every 8 hours  pantoprazole   Suspension 40 milliGRAM(s) Oral before breakfast  polyethylene glycol 3350 17 Gram(s) Oral daily  senna 2 Tablet(s) Oral at bedtime  sucralfate suspension 1 Gram(s) Oral every 6 hours    MEDICATIONS  (PRN):  acetaminophen    Suspension .. 650 milliGRAM(s) Oral every 6 hours PRN Temp greater or equal to 38C (100.4F)      REVIEW OF SYSTEMS:  CONSTITUTIONAL: No fever,  RESPIRATORY: No cough, wheezing, shortness of breath  CARDIOVASCULAR: No chest pain, dyspnea, palpitations, dizziness, syncope, paroxysmal nocturnal dyspnea, orthopnea, or arm or leg swelling  GASTROINTESTINAL: No abdominal  or epigastric pain, nausea, vomiting,  diarrhea  NEUROLOGICAL: No headaches,  loss of strength, numbness, or tremors    Vital Signs Last 24 Hrs  T(C): 36.7 (18 Jun 2021 19:00), Max: 37.6 (18 Jun 2021 16:00)  T(F): 98.1 (18 Jun 2021 19:00), Max: 99.7 (18 Jun 2021 16:00)  HR: 64 (18 Jun 2021 21:00) (59 - 140)  BP: 109/59 (18 Jun 2021 21:00) (57/32 - 166/81)  BP(mean): 81 (18 Jun 2021 21:00) (39 - 116)  RR: 14 (18 Jun 2021 21:00) (10 - 47)  SpO2: 100% (18 Jun 2021 21:00) (94% - 100%)    PHYSICAL EXAM:  HEAD:  Atraumatic, Normocephalic  NECK: Supple and normal thyroid.  No JVD or carotid bruit.   HEART: S1, S2 regular , 1/6 soft ejection systolic murmur in mitral area , no thrill and no gallops .  PULMONARY: Bilateral vesicular breathing , few scattered ronchi and few scattered rales are present .  ABDOMEN: Soft nontender and positive bowl sounds   EXTREMITIES:  No clubbing, cyanosis, or pedal  edema  NEUROLOGICAL: AAOX3 , no focal deficit .    LABS:                        10.5   8.10  )-----------( 228      ( 18 Jun 2021 05:30 )             34.0     06-18    143  |  112<H>  |  24<H>  ----------------------------<  143<H>  4.2   |  29  |  0.83    Ca    8.5      18 Jun 2021 05:30  Phos  2.1     06-18  Mg     2.5     06-18    TPro  6.4  /  Alb  2.5<L>  /  TBili  0.4  /  DBili  x   /  AST  42<H>  /  ALT  32  /  AlkPhos  89  06-18            BNP      EKG:  ECHO:  IMAGING:    Assessment/Plan    Will continue to follow during hospital course and give further recommendations as needed . Thanks for your referral . if any questions please contact me at office ([phone] [phone])  JOSIE SON MD [place] [address]  [address]  OFFICE : [phone]  CELL : [phone]    CARDIOLOGY F/U :      HPI:  74 yo Female ,[facility] resident with hx of  PMH Frontotemporal dementia, CVA, chronic vent dependent respiratory failure, dysphagia s/p PEG, recurrent UTI/VAP presents with septic shock from [facility] facility. She was last admitted at [facility]/ 2020 with proteus UTI ,sepsis ,hypernatremia and RYAN .Pseudomonas grew in sputum resistant to zosyn last time Admitted for septic workup and evaluation,send blood and urine cx,serial lactate levels,monitor vitals closley,ivfs hydration,monitor urine output and renal profile,iv abx initiated e. On arrival to ER found to be febrile to 102, WBC 20k, lactate 6.7. CXR with haziness of entire right side, urine appears cloudy. Med hx is significant for Advanced dementia ,s/p  Aphasic stroke , Constipation  ,COVID-19   ,CVA (cerebral vascular accident)  ,Dementia of frontal lobe type  ,Diabetes mellitus  ,DM (diabetes mellitus)type  2   ,GERD (gastroesophageal reflux disease)  ,HTN (hypertension)  ,Hypertension  ,Pneumonia  ,Quadriplegia  ,Respiratory failure ,s/p tracheostomy and peg Palliative care consult requested ,to discuss advance directives and complete /update  MOL  (15 Zay 2021 07:06)        SUBJECTIVE: patient intubated .      ALLERGIES:  Allergies    codeine (Hives)    Intolerances          MEDICATIONS  (STANDING):  ALBUTerol    90 MICROgram(s) HFA Inhaler 2 Puff(s) Inhalation every 6 hours  aspirin  chewable 81 milliGRAM(s) Oral daily  bisacodyl 5 milliGRAM(s) Oral at bedtime  caspofungin IVPB      caspofungin IVPB 50 milliGRAM(s) IV Intermittent every 24 hours  chlorhexidine 2% Cloths 1 Application(s) Topical <User Schedule>  dextrose 40% Gel 15 Gram(s) Oral once  dextrose 5%. 1000 milliLiter(s) (50 mL/Hr) IV Continuous <Continuous>  dextrose 5%. 1000 milliLiter(s) (100 mL/Hr) IV Continuous <Continuous>  dextrose 50% Injectable 25 Gram(s) IV Push once  dextrose 50% Injectable 12.5 Gram(s) IV Push once  dextrose 50% Injectable 25 Gram(s) IV Push once  enoxaparin Injectable 40 milliGRAM(s) SubCutaneous daily  fentaNYL   Patch  12 MICROgram(s)/Hr 1 Patch Transdermal every 72 hours  glucagon  Injectable 1 milliGRAM(s) IntraMuscular once  insulin glargine Injectable (LANTUS) 20 Unit(s) SubCutaneous at bedtime  insulin lispro (ADMELOG) corrective regimen sliding scale   SubCutaneous every 6 hours  ipratropium 17 MICROgram(s) HFA Inhaler 1 Puff(s) Inhalation every 6 hours  meropenem  IVPB 1000 milliGRAM(s) IV Intermittent every 8 hours  pantoprazole   Suspension 40 milliGRAM(s) Oral before breakfast  polyethylene glycol 3350 17 Gram(s) Oral daily  senna 2 Tablet(s) Oral at bedtime  sucralfate suspension 1 Gram(s) Oral every 6 hours    MEDICATIONS  (PRN):  acetaminophen    Suspension .. 650 milliGRAM(s) Oral every 6 hours PRN Temp greater or equal to 38C (100.4F)      REVIEW OF SYSTEMS:  CONSTITUTIONAL: No fever,  RESPIRATORY: Intubated .  CARDIOVASCULAR: No chest pain,  palpitations, dizziness, syncope, paroxysmal nocturnal dyspnea,  or arm or leg swelling but intubated .  GASTROINTESTINAL: No abdominal  or epigastric pain, nausea, vomiting,  diarrhea  NEUROLOGICAL: No headaches,   numbness, or tremors  Skin : No itching .  Hematology : No bleeding .  Endocrinology : No heat and cold intolerance .  Psychiatry : Patient is under effect of sedation .  Musculoskeletal : patient has mild arthritis .    Vital Signs Last 24 Hrs  T(C): 36.7 (18 Jun 2021 19:00), Max: 37.6 (18 Jun 2021 16:00)  T(F): 98.1 (18 Jun 2021 19:00), Max: 99.7 (18 Jun 2021 16:00)  HR: 64 (18 Jun 2021 21:00) (59 - 140)  BP: 109/59 (18 Jun 2021 21:00) (57/32 - 166/81)  BP(mean): 81 (18 Jun 2021 21:00) (39 - 116)  RR: 14 (18 Jun 2021 21:00) (10 - 47)  SpO2: 100% (18 Jun 2021 21:00) (94% - 100%)    PHYSICAL EXAM:  HEAD:  Atraumatic, Normocephalic  NECK: Supple and normal thyroid.  No JVD or carotid bruit.   HEART: S1, S2 regular , 1/6 soft ejection systolic murmur in mitral area , no thrill and no gallops .  PULMONARY: Bilateral vesicular breathing , few scattered ronchi and few scattered rales are present .  ABDOMEN: Soft nontender and positive bowl sounds   EXTREMITIES:  No clubbing, cyanosis, or pedal  edema  NEUROLOGICAL: under effect of sedation .  Skin : No rashes .  Musculoskeletal : No joint swellings .    LABS:                        10.5   8.10  )-----------( 228      ( 18 Jun 2021 05:30 )             34.0     06-18    143  |  112<H>  |  24<H>  ----------------------------<  143<H>  4.2   |  29  |  0.83    Ca    8.5      18 Jun 2021 05:30  Phos  2.1     06-18  Mg     2.5     06-18    TPro  6.4  /  Alb  2.5<L>  /  TBili  0.4  /  DBili  x   /  AST  42<H>  /  ALT  32  /  AlkPhos  89  06-18            Assessment/Plan  Patient has :  1) Respiratory failure .  2) Pneumonia R/O sepsis .  3) S/P Hypotension and septic shock and BP better now and mild elevated  .  4) H/O CVA   5) Anemia   Plan : 1) I/V antibiotics as per ID 2) Monitor hemoglobin and electrolyte 3) Rest of medications a such 4) Prognosis guarded .  Will continue to follow during hospital course and give further recommendations as needed . Thanks for your referral . if any questions please contact me at office ([phone] [phone])

## 2021-06-18 NOTE — PROGRESS NOTE ADULT - SUBJECTIVE AND OBJECTIVE BOX
Patient is a 75y old  Female who presents with a chief complaint of fever and dyspnea (18 Jun 2021 09:07)    24 hour events: Patient with recurrent fevers, Tmax 101.1 at 8:30PM with associated episodes of hypotension that respond to fluids. Patient seen and examined at bedside.     REVIEW OF SYSTEMS  Unable to assess 2/2 mental status     T(F): 98.8 (06-18-21 @ 14:00), Max: 101.1 (06-17-21 @ 20:29)  HR: 82 (06-18-21 @ 14:26) (59 - 114)  BP: 157/69 (06-18-21 @ 14:00) (66/28 - 177/73)  RR: 31 (06-18-21 @ 14:00) (10 - 31)  SpO2: 100% (06-18-21 @ 14:26) (86% - 100%)  Wt(kg): --    Mode: AC/ CMV (Assist Control/ Continuous Mandatory Ventilation), RR (machine): 14, TV (machine): 400, FiO2: 25, PEEP: 5        I&O's Summary    06-17 @ 07:01  -  06-18 @ 07:00  --------------------------------------------------------  IN: 4760 mL / OUT: 600 mL / NET: 4160 mL    06-18 @ 07:01  -  06-18 @ 14:27  --------------------------------------------------------  IN: 1160 mL / OUT: 0 mL / NET: 1160 mL    PHYSICAL EXAM  General: Elderly, chronic trach to vent, ill-appearing  HEENT: Pupils equal, reactive to light. Symmetric.  PULM: Clear to auscultation bilaterally, no significant sputum production. No wheezes or rales.  CVS: Regular rate and rhythm, no murmurs, rubs, or gallops  ABD: Soft, nondistended, normoactive bowel sounds, no guarding. +PEG in place, c/d/i  EXT: No edema, nontender  SKIN: Warm and dry   NEURO: A&Ox0, opens eyes to voice, contracted UE > LE    MEDICATIONS  caspofungin IVPB   caspofungin IVPB IV Intermittent  meropenem  IVPB IV Intermittent  dextrose 40% Gel Oral  dextrose 50% Injectable IV Push  dextrose 50% Injectable IV Push  dextrose 50% Injectable IV Push  glucagon  Injectable IntraMuscular  insulin glargine Injectable (LANTUS) SubCutaneous  insulin lispro (ADMELOG) corrective regimen sliding scale SubCutaneous  ALBUTerol    90 MICROgram(s) HFA Inhaler Inhalation  ipratropium 17 MICROgram(s) HFA Inhaler Inhalation  acetaminophen    Suspension .. Oral PRN  fentaNYL   Patch  12 MICROgram(s)/Hr Transdermal  aspirin  chewable Oral  enoxaparin Injectable SubCutaneous  bisacodyl Oral  pantoprazole   Suspension Oral  polyethylene glycol 3350 Oral  senna Oral  sucralfate suspension Oral  dextrose 5%. IV Continuous  dextrose 5%. IV Continuous  chlorhexidine 2% Cloths Topical                          10.5   8.10  )-----------( 228      ( 18 Jun 2021 05:30 )             34.0       06-18    143  |  112<H>  |  24<H>  ----------------------------<  143<H>  4.2   |  29  |  0.83    Ca    8.5      18 Jun 2021 05:30  Phos  2.1     06-18  Mg     2.5     06-18    TPro  6.4  /  Alb  2.5<L>  /  TBili  0.4  /  DBili  x   /  AST  42<H>  /  ALT  32  /  AlkPhos  89  06-18              .Urine Catheterized   50,000 - 99,000 CFU/mL Presumptive Candida albicans  "Susceptibilities not performed" -- 06-15 @ 16:42  .Sputum Sputum   Normal Respiratory Elvira present   Few polymorphonuclear leukocytes per low power field  Few Squamous epithelial cells per low power field  Rare Gram Negative Rods per oil power field 06-15 @ 16:28  .Blood Blood-Peripheral   No growth to date. -- 06-15 @ 04:41      Rapid RVP Result: NotDetec (06-14 @ 23:02)    Radiology: N/A  Bedside lung ultrasound: N/A  Bedside ECHO: N/A    CENTRAL LINE: N  REID: N                        DATE REMOVED: 6/16    A-LINE: N    GLOBAL ISSUE/BEST PRACTICE  Analgesia: N  Sedation: N  CAM-ICU: Y  HOB elevation: yes  Stress ulcer prophylaxis: Y  VTE prophylaxis: Y  Glycemic control: Y  Nutrition: Y    CODE STATUS: FULL  GOC discussion: Y

## 2021-06-18 NOTE — PROGRESS NOTE ADULT - ASSESSMENT
74F PMH Frontotemporal dementia, CVA, chronic vent dependent respiratory failure, dysphagia s/p PEG, recurrent UTI/VAP presents with septic shock from [facility] facility. Source most likely urine, r/o PNA as well.     Neuro  - Baseline dementia from previous notes, unable to follow commands, but awake and alert.   - continue Fentanyl patch    CV  Hypotension likely 2/2 sepsis now resolving  - BP improving with MAP >80 but requiring fluid resuscitation for episodes of hypotension during fevers  - Off Levophed     Pulm  r/o PNA  - Hx of VAP with Pseudomonal resistant to Zosyn but sensitive to Meropenem   - CT chest 6/15 shows Nonspecific patchy airspace opacities in the lower lobes which can be on an infectious basis or possibly related to aspiration. Residual trace left pleural effusion  - Tolerating vent settings well  - Home dose inhalers   - Sputum cultures shows normal alejandro, but rare gram neg rods.  - C/w Meropenem     GI  - continue PEG tube feeds  - continue bowel regimen senna and miralax, and Dulcolax to regimen    Renal  RYAN resolved  - Likely 2/2 hypotension in the setting of sepsis  - S/p IVF hydration  - Monitor UOP  - Trend renal indices    ID  Septic shock, h/o proteus mirabilis indol neg in urine and pseudomonas resistant to Zosyn   - S/p 4L IVF resuscitation   - Remains febrile, but with downtrending white count  - MRSA negative   - BCx NGTD  - UCx Candida albicans  - Sputum cultures shows normal alejandro, but rare gram neg rods.  - Continue Meropenem. Added Caspo 6/17  - Monitor hemodynamics and labs, F/u Fungitell lab    UTI  - Hx of proteus UTI  - UA borderline positive   - UCx Candida albicans  - Started Caspo     Heme  - DVT ppx, HSQ switched to lovenox    Endo  Type 2 DM  - corrective scale  - continue Lantus 20qhs     Dispo: Continues to require ICU level care  Full code 74F PMH Frontotemporal dementia, CVA, chronic vent dependent respiratory failure, dysphagia s/p PEG, recurrent UTI/VAP presents with septic shock from [facility] facility. Source most likely urine, r/o PNA as well.     Neuro  - Baseline dementia from previous notes, unable to follow commands, but awake and alert.   - continue Fentanyl patch    CV  Hypotension likely 2/2 sepsis now resolving  - BP improving with MAP >80 but requiring fluid resuscitation for episodes of hypotension during fevers  - Off Levophed     Pulm  r/o PNA  - Hx of VAP with Pseudomonal resistant to Zosyn but sensitive to Meropenem   - CT chest 6/15 shows Nonspecific patchy airspace opacities in the lower lobes which can be on an infectious basis or possibly related to aspiration. Residual trace left pleural effusion  - Tolerating vent settings well  - Home dose inhalers   - Sputum cultures shows normal alejandro, but rare gram neg rods.  - C/w Meropenem     GI  - continue PEG tube feeds  - continue bowel regimen senna and miralax, and Dulcolax to regimen    Renal  RYAN resolved  - Likely 2/2 hypotension in the setting of sepsis  - S/p IVF hydration  - Monitor UOP  - Trend renal indices    ID  Septic shock, h/o proteus mirabilis indol neg in urine and pseudomonas resistant to Zosyn   - S/p 4L IVF resuscitation   - Remains febrile, but with downtrending white count  - MRSA negative   - BCx 6/15 NGTD, rpt 6/17 pending  - UCx Candida albicans  - Sputum cultures shows normal alejandro, but rare gram neg rods.  - Continue Meropenem. Added Caspo 6/17  - Monitor hemodynamics and labs, F/u Fungitell lab    UTI  - Hx of proteus UTI  - UA borderline positive   - UCx Candida albicans  - Started Caspo     Heme  - DVT ppx, HSQ switched to lovenox    Endo  Type 2 DM  - corrective scale  - continue Lantus 20qhs     Dispo: Continues to require ICU level care  Full code

## 2021-06-18 NOTE — PROGRESS NOTE ADULT - SUBJECTIVE AND OBJECTIVE BOX
BREA BECKHAM    [facility] ICU1 12A    Patient is a 75y old  Female who presents with a chief complaint of fever and dyspnea (18 Jun 2021 06:11)       Allergies    codeine (Hives)    Intolerances        HPI:  76 yo Female ,[facility] resident with hx of  PMH Frontotemporal dementia, CVA, chronic vent dependent respiratory failure, dysphagia s/p PEG, recurrent UTI/VAP presents with septic shock from [facility] facility. She was last admitted at [facility] 10/ 2020 with proteus UTI ,sepsis ,hypernatremia and RYAN .Pseudomonas grew in sputum resistant to zosyn last time Admitted for septic workup and evaluation,send blood and urine cx,serial lactate levels,monitor vitals closley,ivfs hydration,monitor urine output and renal profile,iv abx initiated e. On arrival to ER found to be febrile to 102, WBC 20k, lactate 6.7. CXR with haziness of entire right side, urine appears cloudy. Med hx is significant for Advanced dementia ,s/p  Aphasic stroke , Constipation  ,COVID-19   ,CVA (cerebral vascular accident)  ,Dementia of frontal lobe type  ,Diabetes mellitus  ,DM (diabetes mellitus)type  2   ,GERD (gastroesophageal reflux disease)  ,HTN (hypertension)  ,Hypertension  ,Pneumonia  ,Quadriplegia  ,Respiratory failure ,s/p tracheostomy and peg Palliative care consult requested ,to discuss advance directives and complete /update  MOLST  (15 Zay 2021 07:06)      PAST MEDICAL & SURGICAL HISTORY:  Dementia of frontal lobe type    Aphasic stroke    Diabetes mellitus    Respiratory failure    Hypertension    GERD (gastroesophageal reflux disease)    Constipation    Respiratory failure    CVA (cerebral vascular accident)    HTN (hypertension)    DM (diabetes mellitus)    Advanced dementia    COVID-19 virus detected    Quadriplegia    Pneumonia    Type II diabetes mellitus    Hx of appendectomy    Gastrostomy in place    Tracheostomy in place    Tracheostomy tube present    Feeding by G-tube        FAMILY HISTORY:  No pertinent family history in first degree relatives          MEDICATIONS   acetaminophen    Suspension .. 650 milliGRAM(s) Oral every 6 hours PRN  ALBUTerol    90 MICROgram(s) HFA Inhaler 2 Puff(s) Inhalation every 6 hours  aspirin  chewable 81 milliGRAM(s) Oral daily  bisacodyl 5 milliGRAM(s) Oral at bedtime  caspofungin IVPB      caspofungin IVPB 50 milliGRAM(s) IV Intermittent every 24 hours  chlorhexidine 2% Cloths 1 Application(s) Topical <User Schedule>  dextrose 40% Gel 15 Gram(s) Oral once  dextrose 5%. 1000 milliLiter(s) IV Continuous <Continuous>  dextrose 5%. 1000 milliLiter(s) IV Continuous <Continuous>  dextrose 50% Injectable 25 Gram(s) IV Push once  dextrose 50% Injectable 12.5 Gram(s) IV Push once  dextrose 50% Injectable 25 Gram(s) IV Push once  fentaNYL   Patch  12 MICROgram(s)/Hr 1 Patch Transdermal every 72 hours  glucagon  Injectable 1 milliGRAM(s) IntraMuscular once  heparin   Injectable 5000 Unit(s) SubCutaneous every 8 hours  insulin glargine Injectable (LANTUS) 20 Unit(s) SubCutaneous at bedtime  insulin lispro (ADMELOG) corrective regimen sliding scale   SubCutaneous every 6 hours  ipratropium 17 MICROgram(s) HFA Inhaler 1 Puff(s) Inhalation every 6 hours  lactated ringers. 500 milliLiter(s) IV Continuous <Continuous>  meropenem  IVPB 1000 milliGRAM(s) IV Intermittent every 8 hours  pantoprazole   Suspension 40 milliGRAM(s) Oral before breakfast  polyethylene glycol 3350 17 Gram(s) Oral daily  senna 2 Tablet(s) Oral at bedtime  sucralfate suspension 1 Gram(s) Oral every 6 hours      Vital Signs Last 24 Hrs  T(C): 37.3 (18 Jun 2021 00:02), Max: 38.4 (17 Jun 2021 20:29)  T(F): 99.1 (18 Jun 2021 00:02), Max: 101.1 (17 Jun 2021 20:29)  HR: 70 (18 Jun 2021 07:57) (59 - 114)  BP: 135/68 (18 Jun 2021 07:00) (66/28 - 177/73)  BP(mean): 95 (18 Jun 2021 07:00) (41 - 105)  RR: 10 (18 Jun 2021 07:00) (10 - 29)  SpO2: 100% (18 Jun 2021 07:57) (86% - 100%)      06-17-21 @ 07:01  -  06-18-21 @ 07:00  --------------------------------------------------------  IN: 4760 mL / OUT: 600 mL / NET: 4160 mL        Mode: AC/ CMV (Assist Control/ Continuous Mandatory Ventilation), RR (machine): 14, TV (machine): 400, FiO2: 25, PEEP: 5, ITime: 1, MAP: 10, PIP: 30    LABS:                        10.5   8.10  )-----------( 228      ( 18 Jun 2021 05:30 )             34.0     06-18    143  |  112<H>  |  24<H>  ----------------------------<  143<H>  4.2   |  29  |  0.83    Ca    8.5      18 Jun 2021 05:30  Phos  2.1     06-18  Mg     2.5     06-18    TPro  6.4  /  Alb  2.5<L>  /  TBili  0.4  /  DBili  x   /  AST  42<H>  /  ALT  32  /  AlkPhos  89  06-18              WBC:  WBC Count: 8.10 K/uL (06-18 @ 05:30)  WBC Count: 11.38 K/uL (06-17 @ 05:12)  WBC Count: 11.26 K/uL (06-16 @ 05:32)  WBC Count: 12.57 K/uL (06-15 @ 05:22)  WBC Count: 24.34 K/uL (06-14 @ 23:02)      MICROBIOLOGY:  RECENT CULTURES:  06-15 .Urine Catheterized XXXX XXXX   50,000 - 99,000 CFU/mL Presumptive Candida albicans  "Susceptibilities not performed"    06-15 .Sputum Sputum XXXX   Few polymorphonuclear leukocytes per low power field  Few Squamous epithelial cells per low power field  Rare Gram Negative Rods per oil power field   Normal Respiratory Elvira present    06-15 .Blood Blood-Peripheral XXXX XXXX   No growth to date.                    Sodium:  Sodium, Serum: 143 mmol/L (06-18 @ 05:30)  Sodium, Serum: 148 mmol/L (06-17 @ 05:12)  Sodium, Serum: 145 mmol/L (06-16 @ 05:32)  Sodium, Serum: 147 mmol/L (06-15 @ 05:22)  Sodium, Serum: 140 mmol/L (06-14 @ 23:02)      0.83 mg/dL 06-18 @ 05:30  1.00 mg/dL 06-17 @ 05:12  1.10 mg/dL 06-16 @ 05:32  1.30 mg/dL 06-15 @ 05:22  1.70 mg/dL 06-14 @ 23:02      Hemoglobin:  Hemoglobin: 10.5 g/dL (06-18 @ 05:30)  Hemoglobin: 10.8 g/dL (06-17 @ 05:12)  Hemoglobin: 10.6 g/dL (06-16 @ 05:32)  Hemoglobin: 9.7 g/dL (06-15 @ 05:22)  Hemoglobin: 12.3 g/dL (06-14 @ 23:02)      Platelets: Platelet Count - Automated: 228 K/uL (06-18 @ 05:30)  Platelet Count - Automated: 267 K/uL (06-17 @ 05:12)  Platelet Count - Automated: 275 K/uL (06-16 @ 05:32)  Platelet Count - Automated: 258 K/uL (06-15 @ 05:22)  Platelet Count - Automated: 471 K/uL (06-14 @ 23:02)      LIVER FUNCTIONS - ( 18 Jun 2021 05:30 )  Alb: 2.5 g/dL / Pro: 6.4 g/dL / ALK PHOS: 89 U/L / ALT: 32 U/L / AST: 42 U/L / GGT: x                 RADIOLOGY & ADDITIONAL STUDIES:

## 2021-06-19 LAB
ALBUMIN SERPL ELPH-MCNC: 2.7 G/DL — LOW (ref 3.3–5)
ALP SERPL-CCNC: 102 U/L — SIGNIFICANT CHANGE UP (ref 40–120)
ALT FLD-CCNC: 102 U/L — HIGH (ref 12–78)
ANION GAP SERPL CALC-SCNC: 9 MMOL/L — SIGNIFICANT CHANGE UP (ref 5–17)
AST SERPL-CCNC: 118 U/L — HIGH (ref 15–37)
BASOPHILS # BLD AUTO: 0.03 K/UL — SIGNIFICANT CHANGE UP (ref 0–0.2)
BASOPHILS NFR BLD AUTO: 0.3 % — SIGNIFICANT CHANGE UP (ref 0–2)
BILIRUB SERPL-MCNC: 0.5 MG/DL — SIGNIFICANT CHANGE UP (ref 0.2–1.2)
BUN SERPL-MCNC: 26 MG/DL — HIGH (ref 7–23)
CALCIUM SERPL-MCNC: 8.6 MG/DL — SIGNIFICANT CHANGE UP (ref 8.5–10.1)
CHLORIDE SERPL-SCNC: 109 MMOL/L — HIGH (ref 96–108)
CO2 SERPL-SCNC: 25 MMOL/L — SIGNIFICANT CHANGE UP (ref 22–31)
CREAT SERPL-MCNC: 1.1 MG/DL — SIGNIFICANT CHANGE UP (ref 0.5–1.3)
EOSINOPHIL # BLD AUTO: 0.07 K/UL — SIGNIFICANT CHANGE UP (ref 0–0.5)
EOSINOPHIL NFR BLD AUTO: 0.7 % — SIGNIFICANT CHANGE UP (ref 0–6)
FUNGITELL: 44 PG/ML — SIGNIFICANT CHANGE UP
GLUCOSE SERPL-MCNC: 152 MG/DL — HIGH (ref 70–99)
HCT VFR BLD CALC: 35.4 % — SIGNIFICANT CHANGE UP (ref 34.5–45)
HGB BLD-MCNC: 10.7 G/DL — LOW (ref 11.5–15.5)
IMM GRANULOCYTES NFR BLD AUTO: 0.9 % — SIGNIFICANT CHANGE UP (ref 0–1.5)
LYMPHOCYTES # BLD AUTO: 1.09 K/UL — SIGNIFICANT CHANGE UP (ref 1–3.3)
LYMPHOCYTES # BLD AUTO: 11.3 % — LOW (ref 13–44)
MAGNESIUM SERPL-MCNC: 2.8 MG/DL — HIGH (ref 1.6–2.6)
MCHC RBC-ENTMCNC: 27.4 PG — SIGNIFICANT CHANGE UP (ref 27–34)
MCHC RBC-ENTMCNC: 30.2 GM/DL — LOW (ref 32–36)
MCV RBC AUTO: 90.5 FL — SIGNIFICANT CHANGE UP (ref 80–100)
MONOCYTES # BLD AUTO: 0.6 K/UL — SIGNIFICANT CHANGE UP (ref 0–0.9)
MONOCYTES NFR BLD AUTO: 6.2 % — SIGNIFICANT CHANGE UP (ref 2–14)
NEUTROPHILS # BLD AUTO: 7.74 K/UL — HIGH (ref 1.8–7.4)
NEUTROPHILS NFR BLD AUTO: 80.6 % — HIGH (ref 43–77)
NRBC # BLD: 0 /100 WBCS — SIGNIFICANT CHANGE UP (ref 0–0)
PHOSPHATE SERPL-MCNC: 2.5 MG/DL — SIGNIFICANT CHANGE UP (ref 2.5–4.5)
PLATELET # BLD AUTO: 261 K/UL — SIGNIFICANT CHANGE UP (ref 150–400)
POTASSIUM SERPL-MCNC: 4.3 MMOL/L — SIGNIFICANT CHANGE UP (ref 3.5–5.3)
POTASSIUM SERPL-SCNC: 4.3 MMOL/L — SIGNIFICANT CHANGE UP (ref 3.5–5.3)
PROT SERPL-MCNC: 6.8 G/DL — SIGNIFICANT CHANGE UP (ref 6–8.3)
RBC # BLD: 3.91 M/UL — SIGNIFICANT CHANGE UP (ref 3.8–5.2)
RBC # FLD: 15.5 % — HIGH (ref 10.3–14.5)
SODIUM SERPL-SCNC: 143 MMOL/L — SIGNIFICANT CHANGE UP (ref 135–145)
WBC # BLD: 9.62 K/UL — SIGNIFICANT CHANGE UP (ref 3.8–10.5)
WBC # FLD AUTO: 9.62 K/UL — SIGNIFICANT CHANGE UP (ref 3.8–10.5)

## 2021-06-19 PROCEDURE — 99233 SBSQ HOSP IP/OBS HIGH 50: CPT | Mod: GC

## 2021-06-19 RX ORDER — NOREPINEPHRINE BITARTRATE/D5W 8 MG/250ML
0.05 PLASTIC BAG, INJECTION (ML) INTRAVENOUS
Qty: 8 | Refills: 0 | Status: DISCONTINUED | OUTPATIENT
Start: 2021-06-19 | End: 2021-06-23

## 2021-06-19 RX ORDER — HYDROMORPHONE HYDROCHLORIDE 2 MG/ML
0.5 INJECTION INTRAMUSCULAR; INTRAVENOUS; SUBCUTANEOUS ONCE
Refills: 0 | Status: DISCONTINUED | OUTPATIENT
Start: 2021-06-19 | End: 2021-06-19

## 2021-06-19 RX ORDER — MIDAZOLAM HYDROCHLORIDE 1 MG/ML
2 INJECTION, SOLUTION INTRAMUSCULAR; INTRAVENOUS ONCE
Refills: 0 | Status: DISCONTINUED | OUTPATIENT
Start: 2021-06-19 | End: 2021-06-19

## 2021-06-19 RX ADMIN — ALBUTEROL 2 PUFF(S): 90 AEROSOL, METERED ORAL at 14:14

## 2021-06-19 RX ADMIN — CHLORHEXIDINE GLUCONATE 1 APPLICATION(S): 213 SOLUTION TOPICAL at 06:01

## 2021-06-19 RX ADMIN — Medication 1 PUFF(S): at 14:14

## 2021-06-19 RX ADMIN — Medication 2: at 23:25

## 2021-06-19 RX ADMIN — Medication 1 GRAM(S): at 23:25

## 2021-06-19 RX ADMIN — Medication 81 MILLIGRAM(S): at 11:33

## 2021-06-19 RX ADMIN — PANTOPRAZOLE SODIUM 40 MILLIGRAM(S): 20 TABLET, DELAYED RELEASE ORAL at 06:04

## 2021-06-19 RX ADMIN — FENTANYL CITRATE 1 PATCH: 50 INJECTION INTRAVENOUS at 08:01

## 2021-06-19 RX ADMIN — Medication 2: at 06:02

## 2021-06-19 RX ADMIN — HYDROMORPHONE HYDROCHLORIDE 0.5 MILLIGRAM(S): 2 INJECTION INTRAMUSCULAR; INTRAVENOUS; SUBCUTANEOUS at 17:53

## 2021-06-19 RX ADMIN — ENOXAPARIN SODIUM 40 MILLIGRAM(S): 100 INJECTION SUBCUTANEOUS at 11:33

## 2021-06-19 RX ADMIN — Medication 1 GRAM(S): at 06:04

## 2021-06-19 RX ADMIN — MEROPENEM 100 MILLIGRAM(S): 1 INJECTION INTRAVENOUS at 14:39

## 2021-06-19 RX ADMIN — MEROPENEM 100 MILLIGRAM(S): 1 INJECTION INTRAVENOUS at 06:06

## 2021-06-19 RX ADMIN — Medication 1 GRAM(S): at 11:33

## 2021-06-19 RX ADMIN — Medication 4.97 MICROGRAM(S)/KG/MIN: at 06:56

## 2021-06-19 RX ADMIN — Medication 1 PUFF(S): at 20:10

## 2021-06-19 RX ADMIN — FENTANYL CITRATE 1 PATCH: 50 INJECTION INTRAVENOUS at 11:33

## 2021-06-19 RX ADMIN — Medication 1 PUFF(S): at 08:11

## 2021-06-19 RX ADMIN — MEROPENEM 100 MILLIGRAM(S): 1 INJECTION INTRAVENOUS at 21:15

## 2021-06-19 RX ADMIN — ALBUTEROL 2 PUFF(S): 90 AEROSOL, METERED ORAL at 08:11

## 2021-06-19 RX ADMIN — Medication 1 PUFF(S): at 01:26

## 2021-06-19 RX ADMIN — Medication 4.97 MICROGRAM(S)/KG/MIN: at 19:45

## 2021-06-19 RX ADMIN — Medication 1 GRAM(S): at 18:01

## 2021-06-19 RX ADMIN — HYDROMORPHONE HYDROCHLORIDE 0.5 MILLIGRAM(S): 2 INJECTION INTRAMUSCULAR; INTRAVENOUS; SUBCUTANEOUS at 08:46

## 2021-06-19 RX ADMIN — ALBUTEROL 2 PUFF(S): 90 AEROSOL, METERED ORAL at 01:26

## 2021-06-19 RX ADMIN — CASPOFUNGIN ACETATE 260 MILLIGRAM(S): 7 INJECTION, POWDER, LYOPHILIZED, FOR SOLUTION INTRAVENOUS at 12:19

## 2021-06-19 RX ADMIN — POLYETHYLENE GLYCOL 3350 17 GRAM(S): 17 POWDER, FOR SOLUTION ORAL at 11:33

## 2021-06-19 RX ADMIN — FENTANYL CITRATE 1 PATCH: 50 INJECTION INTRAVENOUS at 19:25

## 2021-06-19 RX ADMIN — HYDROMORPHONE HYDROCHLORIDE 0.5 MILLIGRAM(S): 2 INJECTION INTRAMUSCULAR; INTRAVENOUS; SUBCUTANEOUS at 09:00

## 2021-06-19 RX ADMIN — Medication 5 MILLIGRAM(S): at 21:15

## 2021-06-19 RX ADMIN — INSULIN GLARGINE 20 UNIT(S): 100 INJECTION, SOLUTION SUBCUTANEOUS at 23:25

## 2021-06-19 RX ADMIN — FENTANYL CITRATE 1 PATCH: 50 INJECTION INTRAVENOUS at 12:19

## 2021-06-19 RX ADMIN — ALBUTEROL 2 PUFF(S): 90 AEROSOL, METERED ORAL at 20:10

## 2021-06-19 RX ADMIN — SENNA PLUS 2 TABLET(S): 8.6 TABLET ORAL at 21:15

## 2021-06-19 RX ADMIN — MIDAZOLAM HYDROCHLORIDE 2 MILLIGRAM(S): 1 INJECTION, SOLUTION INTRAMUSCULAR; INTRAVENOUS at 05:55

## 2021-06-19 RX ADMIN — Medication 1 MILLIGRAM(S): at 18:29

## 2021-06-19 NOTE — PROGRESS NOTE ADULT - SUBJECTIVE AND OBJECTIVE BOX
Date/Time Patient Seen:  		  Referring MD:   Data Reviewed	       Patient is a 75y old  Female who presents with a chief complaint of fever and dyspnea (18 Jun 2021 23:17)      Subjective/HPI     PAST MEDICAL & SURGICAL HISTORY:  Dementia of frontal lobe type    Aphasic stroke    Diabetes mellitus    Respiratory failure    Hypertension    GERD (gastroesophageal reflux disease)    Constipation    Respiratory failure    CVA (cerebral vascular accident)    HTN (hypertension)    DM (diabetes mellitus)    Advanced dementia    COVID-19 virus detected    Quadriplegia    Pneumonia    Type II diabetes mellitus    Hx of appendectomy    Gastrostomy in place    Tracheostomy in place    Tracheostomy tube present    Feeding by G-tube          Medication list         MEDICATIONS  (STANDING):  ALBUTerol    90 MICROgram(s) HFA Inhaler 2 Puff(s) Inhalation every 6 hours  aspirin  chewable 81 milliGRAM(s) Oral daily  bisacodyl 5 milliGRAM(s) Oral at bedtime  caspofungin IVPB      caspofungin IVPB 50 milliGRAM(s) IV Intermittent every 24 hours  chlorhexidine 2% Cloths 1 Application(s) Topical <User Schedule>  dextrose 40% Gel 15 Gram(s) Oral once  dextrose 5%. 1000 milliLiter(s) (50 mL/Hr) IV Continuous <Continuous>  dextrose 5%. 1000 milliLiter(s) (100 mL/Hr) IV Continuous <Continuous>  dextrose 50% Injectable 25 Gram(s) IV Push once  dextrose 50% Injectable 12.5 Gram(s) IV Push once  dextrose 50% Injectable 25 Gram(s) IV Push once  enoxaparin Injectable 40 milliGRAM(s) SubCutaneous daily  fentaNYL   Patch  12 MICROgram(s)/Hr 1 Patch Transdermal every 72 hours  glucagon  Injectable 1 milliGRAM(s) IntraMuscular once  insulin glargine Injectable (LANTUS) 20 Unit(s) SubCutaneous at bedtime  insulin lispro (ADMELOG) corrective regimen sliding scale   SubCutaneous every 6 hours  ipratropium 17 MICROgram(s) HFA Inhaler 1 Puff(s) Inhalation every 6 hours  meropenem  IVPB 1000 milliGRAM(s) IV Intermittent every 8 hours  pantoprazole   Suspension 40 milliGRAM(s) Oral before breakfast  polyethylene glycol 3350 17 Gram(s) Oral daily  senna 2 Tablet(s) Oral at bedtime  sucralfate suspension 1 Gram(s) Oral every 6 hours    MEDICATIONS  (PRN):  acetaminophen    Suspension .. 650 milliGRAM(s) Oral every 6 hours PRN Temp greater or equal to 38C (100.4F)         Vitals log        ICU Vital Signs Last 24 Hrs  T(C): 36.7 (19 Jun 2021 04:42), Max: 37.6 (18 Jun 2021 16:00)  T(F): 98.1 (19 Jun 2021 04:42), Max: 99.7 (18 Jun 2021 16:00)  HR: 90 (19 Jun 2021 05:51) (62 - 140)  BP: 120/59 (19 Jun 2021 04:00) (57/32 - 166/81)  BP(mean): 85 (19 Jun 2021 04:00) (39 - 116)  ABP: --  ABP(mean): --  RR: 18 (19 Jun 2021 04:00) (10 - 47)  SpO2: 100% (19 Jun 2021 05:51) (94% - 100%)       Mode: AC/ CMV (Assist Control/ Continuous Mandatory Ventilation)  RR (machine): 14  TV (machine): 400  FiO2: 25  PEEP: 5  ITime: 1  MAP: 10  PIP: 33      Input and Output:  I&O's Detail    17 Jun 2021 07:01  -  18 Jun 2021 07:00  --------------------------------------------------------  IN:    Enteral Tube Flush: 260 mL    Free Water: 1500 mL    Glucerna 1.5: 1000 mL    IV PiggyBack: 150 mL    IV PiggyBack: 250 mL    IV PiggyBack: 100 mL    Lactated Ringers: 1000 mL    Lactated Ringers Bolus: 500 mL  Total IN: 4760 mL    OUT:    Voided (mL): 600 mL  Total OUT: 600 mL    Total NET: 4160 mL      18 Jun 2021 07:01  -  19 Jun 2021 06:08  --------------------------------------------------------  IN:    Enteral Tube Flush: 680 mL    Free Water: 740 mL    Glucerna 1.5: 1000 mL    IV PiggyBack: 250 mL    IV PiggyBack: 50 mL    IV PiggyBack: 100 mL  Total IN: 2820 mL    OUT:    Voided (mL): 400 mL  Total OUT: 400 mL    Total NET: 2420 mL          Lab Data                        10.5   8.10  )-----------( 228      ( 18 Jun 2021 05:30 )             34.0     06-18    143  |  112<H>  |  24<H>  ----------------------------<  143<H>  4.2   |  29  |  0.83    Ca    8.5      18 Jun 2021 05:30  Phos  2.1     06-18  Mg     2.5     06-18    TPro  6.4  /  Alb  2.5<L>  /  TBili  0.4  /  DBili  x   /  AST  42<H>  /  ALT  32  /  AlkPhos  89  06-18            Review of Systems	      Objective     Physical Examination    heart s1s2  lung dec BS  abd soft  peg  trach      Pertinent Lab findings & Imaging      Annalise:  NO   Adequate UO     I&O's Detail    17 Jun 2021 07:01  -  18 Jun 2021 07:00  --------------------------------------------------------  IN:    Enteral Tube Flush: 260 mL    Free Water: 1500 mL    Glucerna 1.5: 1000 mL    IV PiggyBack: 150 mL    IV PiggyBack: 250 mL    IV PiggyBack: 100 mL    Lactated Ringers: 1000 mL    Lactated Ringers Bolus: 500 mL  Total IN: 4760 mL    OUT:    Voided (mL): 600 mL  Total OUT: 600 mL    Total NET: 4160 mL      18 Jun 2021 07:01  -  19 Jun 2021 06:08  --------------------------------------------------------  IN:    Enteral Tube Flush: 680 mL    Free Water: 740 mL    Glucerna 1.5: 1000 mL    IV PiggyBack: 250 mL    IV PiggyBack: 50 mL    IV PiggyBack: 100 mL  Total IN: 2820 mL    OUT:    Voided (mL): 400 mL  Total OUT: 400 mL    Total NET: 2420 mL               Discussed with:     Cultures:	        Radiology

## 2021-06-19 NOTE — PROGRESS NOTE ADULT - PROBLEM SELECTOR PLAN 1
2/2 to UTI with candida -caspofungin added 06/17 and   PNA - on meropenem .Off levaphed ,BP improving 2/2 to UTI with candida -Caspofungin added 06/17 and for   PNA - on Meropenem .On Levophed due to reoccurence of hypotension  .

## 2021-06-19 NOTE — PROGRESS NOTE ADULT - ASSESSMENT
Acute infection tx in progress  pt remains full code  updated   ID and CCM notes follow up reviewed    74 yo female hx of aphasic stroke, covid 19, trach/vented, dm BIBEMS from [facility] for respiratory distress, found to have fever here 101.8  sepsis eval in progress  on ABX - cx in progress - on Caspo and Meropenem -   supportive ICU measures  vent support  not a candidate for weaning  spoke with   pt is full code  pain rx regimen - Fentanyl - Opioids -

## 2021-06-19 NOTE — PROGRESS NOTE ADULT - ASSESSMENT
Pt is a 75W from [facility], w/ PMHx of frontotemporal dementia, CVA, CHRF requiring vent, dysphagia s/p PEG, recurrent UTI/VAP p/w septic shock 2/2 UTI vs PNA.    RECOMMENDATIONS  Septic Shock 2/2 recurrent VAP vs recurrent UTI  Candiduria  Shock/Hypotension--resolved; off pressors  ARF--resolved  Leukocytosis--resolved  Prior micro with Pseudomonas aeruginosa -  Piperacillin/Tazobactam: R >64 in sputum, Proteus mirabilis (Indole negative) in urine  6/15 RCx NOF  6/15 UCx +candida albicans  6/18 BCx NGTD  MRSA screen negative  Follow fungitell, in process  Trend temps/CBC  Pulmonary toilet  Appreciate ICU care  -c/w meropenem (6/15-)  -c/w caspofungin (added on 6/17-)  Duration TBD pending clinical improvement      Billy Valenzuela M.D.  [facility], Division of Infectious Diseases  [phone]  After 5pm on weekdays and all day on weekends - please call [phone]     Pt is a 75W from [facility], w/ PMHx of frontotemporal dementia, CVA, CHRF requiring vent, dysphagia s/p PEG, recurrent UTI/VAP p/w septic shock 2/2 UTI vs PNA.    RECOMMENDATIONS  Septic Shock 2/2 recurrent VAP vs recurrent UTI  Candiduria  Shock/Hypotension--was off pressors, now on levophed  ARF--resolved  Leukocytosis--resolved  Prior micro with Pseudomonas aeruginosa -  Piperacillin/Tazobactam: R >64 in sputum, Proteus mirabilis (Indole negative) in urine  6/15 RCx NOF  6/15 UCx +candida albicans  6/18 BCx NGTD  MRSA screen negative  Follow fungitell, in process  Trend temps/CBC  Pulmonary toilet  Appreciate ICU care  -c/w meropenem (6/15-)  -c/w caspofungin (added on 6/17-)  Duration TBD pending clinical improvement      Billy Valenzuela M.D.  [facility], Division of Infectious Diseases  [phone]  After 5pm on weekdays and all day on weekends - please call [phone]

## 2021-06-19 NOTE — PROGRESS NOTE ADULT - ASSESSMENT
PARASHARAMI BREA 75 f [facility] P 6/15/2021   DR ILIANA KEMP      REVIEW OF SYMPTOMS      Able to give (reliable) ROS  NO     PHYSICAL EXAM    HEENT Unremarkable  atraumatic   RESP Fair air entry EXP prolonged    Harsh breath sound Resp distres mild   CARDIAC S1 S2 No S3     NO JVD    ABDOMEN SOFT BS PRESENT NOT DISTENDED No hepatosplenomegaly   PEDAL EDEMA present No calf tenderness  NO rash     PARASHARAMI BREA 75 f [facility] P 6/15/2021 ADMISSION PROBLEMS    COVID STATUS   scv2 6/15/2021 (-)   spkab 6/16 (+)     SEPTIC SHOCK poa  resolvd 6/16/2021   NOREPI NEEDED 6/19/2021 BRIEFLY     VAP poa  SIGMOID COLITIS 6/15/2021 CTAP    CANDIDUIA 6/17/2021   CASPOFUNGIN 6/17/2021     COPD   ANEMIA   HYPERNATREMIA r  FREE WATER 6/17/2021   RYAN resolvd 6/16/2021     PMH VENT DEPENDENT   PMH TRACH  PMH PEG   PMH UTI VAP   PMH ZOSYN RESISTANT PSEUDOMONAS   PMH DEMENTIA   PMH CVA .    GLOBAL AND BEST PRACTICE ISSUES                     ALLERGY.    CODEINE   WEIGHT.     6/15/2021 53                         BMI               6/15/2021 20.                                         .                          ADVANCED DIRECTIVE.                               HEAD OF BED ELEVATION. Yes  DVT PROPHYLAXIS.   HPSC (6/15/2021)   -> lvnx 40 96/18/2021)                  SQUIRES PROPHYLAXIS.       PROTONIX 40 (6/15/2021)   APA.      ASA 81 (6/15/2021)                                                               DYSPHAGIA RECOMM.   DIET.    GLUCERNA 1.5 1000 (6/15/2021)   INFECTION PROPHYLAXIS.   CHLORHEXIDINE 2% (6/15/2021)   FREE WATER.    250.6 (6/17/2021)   IV   PATIENT DATA                ABG.     6/15/2021 12 40% /400/5/14 742/34/166              OXYGENATION.                   VITALS/IO/VENT/DRIPS.   6/19/2021 afeb 62 110/56  6/19/2021 ac 14/400/5/.25        ASSESSMENT/RECOMMENDATIONS.    SEPTIC SHOCK poa   target map 65 (+)     VAP poa  SIGMOID COLITIS 6/15/2021 CTAP   FUNGURIA 6/15    w 6/14-6/15-6/16-5/16-5/19 w 24-12 - 11.2-11.3 - 9.6   ua 6/15/2021 w 6-10 nitr (-) l estr mod   ct cap nc 6/15/2021 mild sigmoid colitis patchy opac lower lobes possibly aspiration   rvp 6/15/2021 rvp (-)  sp sm 6/15 rare gnr   fungtell 6/19/2021 fungitell 44   mrsa 6/15 (-)   urine 6/15 50-99 c  blod c 6/18 (-)   blod c 6/15 (-)    MEROPENEM 1.2 (6/15/2021)   Complete 8 d course   CAPSO 6/17/2021     VENT MANAGEMENT   VENT BUNDLE Target pH 730 (+) PO2 60 (+) po 90-95% Pplat 35 (-)   HOBE DSV DSBT Restrictive fluid strategy   Gas exchange good on 6/15/2021 12 a     COPD   PROV HFA (6/15/2021)   ATRIV (6/15/2021)   under control    RO MI  Tr 6/15/2021 Tr (-)   echo 6/15 n lvsf   mi ruled out     ANEMIA   Hb 6/146/15-6/16-6/18-6/19 Hb 12.3- 9.7-10.2 -10.5  - 10.7   target hb 7 (+)     HYPERNATREMIA   Na 6/15-6/16-6/17-6/18-6/19/2021 Na 147 -[phone]    monitor    RYAN  cr 6/14-6/15-6/16-6/19 Cr 1.7-1.3-1.1 - 1.1   improvd     OVERALL PLAN.  VAP/SIGMOID COLITIS poa BSAB   CANDIDURIA capsofung 6/17/2021   VENT MANAGMNT   ANEMIA Monitor  RENAL Monitor     TIME SPENT   Over 25 minutes aggregate care time spent on encounter; activities included   direct patient care, counseling and/or coordinating care reviewing notes, lab data/ imaging , discussion with multidisciplinary team/ patient  /family and explaining in detail risks, benefits, alternatives  of the recommendations     CHAPINCITO GUIDRY 75 f [facility] P 6/15/2021   DR ILIANA KEMP

## 2021-06-19 NOTE — PROGRESS NOTE ADULT - ASSESSMENT
76 yo Female ,[facility] resident with hx of  PMH Frontotemporal dementia, CVA, chronic vent dependent respiratory failure, dysphagia s/p PEG, recurrent UTI/VAP presents with septic shock from [facility] facility. She was last admitted at [facility] 10/ 2020 with proteus UTI ,sepsis ,hypernatremia and RYAN .Pseudomonas grew in sputum resistant to zosyn last time Admitted for septic workup and evaluation,send blood and urine cx,serial lactate levels,monitor vitals closley,ivfs hydration,monitor urine output and renal profile,iv abx initiated e. On arrival to ER found to be febrile to 102, WBC 20k, lactate 6.7. CXR with haziness of entire right side, urine appears cloudy. Med hx is significant for Advanced dementia ,s/p  Aphasic stroke , Constipation  ,COVID-19   ,CVA (cerebral vascular accident)  ,Dementia of frontal lobe type  ,Diabetes mellitus  ,DM (diabetes mellitus)type  2   ,GERD (gastroesophageal reflux disease)  ,HTN (hypertension)  ,Hypertension  ,Pneumonia  ,Quadriplegia  ,Respiratory failure ,s/p tracheostomy and peg Palliative care consult requested ,to discuss advance directives and complete /update  MOLST  (15 Zay 2021 07:06)      ACUTE RENAL FAILURE: hypernatremia increase water intake   Serum creatinine is improved  0.83    fentaNYL   Patch  12 MICROgram(s)/Hr 1 Patch Transdermal every 72 hours      Admit for septic workup and ID evaluation,send blood and urine cx,serial lactate levels,monitor vitals closley,ivfs hydration,monitor urine output and renal profile  meropenem  IVPB 1000 milliGRAM(s) IV Intermittent every 12 hours

## 2021-06-19 NOTE — PROGRESS NOTE ADULT - ASSESSMENT
76 yo Female ,[facility] resident with hx of  PMH Frontotemporal dementia, CVA, chronic vent dependent respiratory failure, dysphagia s/p PEG, recurrent UTI/VAP presents with septic shock from [facility] facility. She was last admitted at [facility] 10/ 2020 with proteus UTI ,sepsis ,hypernatremia and RYAN .Pseudomonas grew in sputum resistant to zosyn last time Admitted for septic workup and evaluation,send blood and urine cx,serial lactate levels,monitor vitals closley,ivfs hydration,monitor urine output and renal profile,iv abx initiated e. On arrival to ER found to be febrile to 102, WBC 20k, lactate 6.7. CXR with haziness of entire right side, urine appears cloudy. Med hx is significant for Advanced dementia ,s/p  Aphasic stroke , Constipation  ,COVID-19   ,CVA (cerebral vascular accident)  ,Dementia of frontal lobe type  ,Diabetes mellitus  ,DM (diabetes mellitus)  ,GERD (gastroesophageal reflux disease)  ,HTN (hypertension)  ,Hypertension  ,Pneumonia  ,Quadriplegia  ,Respiratory failure ,s/p tracheostomy and peg Palliative care consult requested ,to discuss advance directives and complete /update  [facility]

## 2021-06-19 NOTE — PROGRESS NOTE ADULT - SUBJECTIVE AND OBJECTIVE BOX
BREA BECKHAM    [facility] ICU1 12A    Patient is a 75y old  Female who presents with a chief complaint of fever and dyspnea (19 Jun 2021 09:42)       Allergies    codeine (Hives)    Intolerances        HPI:  76 yo Female ,[facility] resident with hx of  PMH Frontotemporal dementia, CVA, chronic vent dependent respiratory failure, dysphagia s/p PEG, recurrent UTI/VAP presents with septic shock from [facility] facility. She was last admitted at [facility] 10/ 2020 with proteus UTI ,sepsis ,hypernatremia and RYAN .Pseudomonas grew in sputum resistant to zosyn last time Admitted for septic workup and evaluation,send blood and urine cx,serial lactate levels,monitor vitals closley,ivfs hydration,monitor urine output and renal profile,iv abx initiated e. On arrival to ER found to be febrile to 102, WBC 20k, lactate 6.7. CXR with haziness of entire right side, urine appears cloudy. Med hx is significant for Advanced dementia ,s/p  Aphasic stroke , Constipation  ,COVID-19   ,CVA (cerebral vascular accident)  ,Dementia of frontal lobe type  ,Diabetes mellitus  ,DM (diabetes mellitus)type  2   ,GERD (gastroesophageal reflux disease)  ,HTN (hypertension)  ,Hypertension  ,Pneumonia  ,Quadriplegia  ,Respiratory failure ,s/p tracheostomy and peg Palliative care consult requested ,to discuss advance directives and complete /update  MOLST  (15 Zay 2021 07:06)      PAST MEDICAL & SURGICAL HISTORY:  Dementia of frontal lobe type    Aphasic stroke    Diabetes mellitus    Respiratory failure    Hypertension    GERD (gastroesophageal reflux disease)    Constipation    Respiratory failure    CVA (cerebral vascular accident)    HTN (hypertension)    DM (diabetes mellitus)    Advanced dementia    COVID-19 virus detected    Quadriplegia    Pneumonia    Type II diabetes mellitus    Hx of appendectomy    Gastrostomy in place    Tracheostomy in place    Tracheostomy tube present    Feeding by G-tube        FAMILY HISTORY:  No pertinent family history in first degree relatives          MEDICATIONS   acetaminophen    Suspension .. 650 milliGRAM(s) Oral every 6 hours PRN  ALBUTerol    90 MICROgram(s) HFA Inhaler 2 Puff(s) Inhalation every 6 hours  aspirin  chewable 81 milliGRAM(s) Oral daily  bisacodyl 5 milliGRAM(s) Oral at bedtime  caspofungin IVPB      caspofungin IVPB 50 milliGRAM(s) IV Intermittent every 24 hours  chlorhexidine 2% Cloths 1 Application(s) Topical <User Schedule>  dextrose 40% Gel 15 Gram(s) Oral once  dextrose 5%. 1000 milliLiter(s) IV Continuous <Continuous>  dextrose 5%. 1000 milliLiter(s) IV Continuous <Continuous>  dextrose 50% Injectable 25 Gram(s) IV Push once  dextrose 50% Injectable 12.5 Gram(s) IV Push once  dextrose 50% Injectable 25 Gram(s) IV Push once  enoxaparin Injectable 40 milliGRAM(s) SubCutaneous daily  fentaNYL   Patch  12 MICROgram(s)/Hr 1 Patch Transdermal every 72 hours  glucagon  Injectable 1 milliGRAM(s) IntraMuscular once  insulin glargine Injectable (LANTUS) 20 Unit(s) SubCutaneous at bedtime  insulin lispro (ADMELOG) corrective regimen sliding scale   SubCutaneous every 6 hours  ipratropium 17 MICROgram(s) HFA Inhaler 1 Puff(s) Inhalation every 6 hours  meropenem  IVPB 1000 milliGRAM(s) IV Intermittent every 8 hours  norepinephrine Infusion 0.05 MICROgram(s)/kG/Min IV Continuous <Continuous>  pantoprazole   Suspension 40 milliGRAM(s) Oral before breakfast  polyethylene glycol 3350 17 Gram(s) Oral daily  senna 2 Tablet(s) Oral at bedtime  sucralfate suspension 1 Gram(s) Oral every 6 hours      Vital Signs Last 24 Hrs  T(C): 36.7 (19 Jun 2021 04:42), Max: 37.6 (18 Jun 2021 16:00)  T(F): 98.1 (19 Jun 2021 04:42), Max: 99.7 (18 Jun 2021 16:00)  HR: 69 (19 Jun 2021 09:00) (63 - 140)  BP: 120/59 (19 Jun 2021 09:00) (57/32 - 166/81)  BP(mean): 83 (19 Jun 2021 09:00) (39 - 116)  RR: 19 (19 Jun 2021 09:00) (12 - 47)  SpO2: 98% (19 Jun 2021 09:00) (94% - 100%)      06-18-21 @ 07:01  -  06-19-21 @ 07:00  --------------------------------------------------------  IN: 3670 mL / OUT: 800 mL / NET: 2870 mL    06-19-21 @ 07:01  -  06-19-21 @ 10:56  --------------------------------------------------------  IN: 4 mL / OUT: 0 mL / NET: 4 mL        Mode: AC/ CMV (Assist Control/ Continuous Mandatory Ventilation), RR (machine): 14, TV (machine): 400, FiO2: 25, PEEP: 5, ITime: 1, MAP: 9, PIP: 22    LABS:                        10.7   9.62  )-----------( 261      ( 19 Jun 2021 09:27 )             35.4     06-19    143  |  109<H>  |  26<H>  ----------------------------<  152<H>  4.3   |  25  |  1.10    Ca    8.6      19 Jun 2021 09:27  Phos  2.5     06-19  Mg     2.8     06-19    TPro  6.8  /  Alb  2.7<L>  /  TBili  0.5  /  DBili  x   /  AST  118<H>  /  ALT  102<H>  /  AlkPhos  102  06-19              WBC:  WBC Count: 9.62 K/uL (06-19 @ 09:27)  WBC Count: 8.10 K/uL (06-18 @ 05:30)  WBC Count: 11.38 K/uL (06-17 @ 05:12)  WBC Count: 11.26 K/uL (06-16 @ 05:32)      MICROBIOLOGY:  RECENT CULTURES:  06-18 .Blood Blood-Peripheral XXXX XXXX   No growth to date.    06-15 .Urine Catheterized XXXX XXXX   50,000 - 99,000 CFU/mL Presumptive Candida albicans  "Susceptibilities not performed"    06-15 .Sputum Sputum XXXX   Few polymorphonuclear leukocytes per low power field  Few Squamous epithelial cells per low power field  Rare Gram Negative Rods per oil power field   Normal Respiratory Elvira present    06-15 .Blood Blood-Peripheral XXXX XXXX   No growth to date.                    Sodium:  Sodium, Serum: 143 mmol/L (06-19 @ 09:27)  Sodium, Serum: 143 mmol/L (06-18 @ 05:30)  Sodium, Serum: 148 mmol/L (06-17 @ 05:12)  Sodium, Serum: 145 mmol/L (06-16 @ 05:32)      1.10 mg/dL 06-19 @ 09:27  0.83 mg/dL 06-18 @ 05:30  1.00 mg/dL 06-17 @ 05:12  1.10 mg/dL 06-16 @ 05:32      Hemoglobin:  Hemoglobin: 10.7 g/dL (06-19 @ 09:27)  Hemoglobin: 10.5 g/dL (06-18 @ 05:30)  Hemoglobin: 10.8 g/dL (06-17 @ 05:12)  Hemoglobin: 10.6 g/dL (06-16 @ 05:32)      Platelets: Platelet Count - Automated: 261 K/uL (06-19 @ 09:27)  Platelet Count - Automated: 228 K/uL (06-18 @ 05:30)  Platelet Count - Automated: 267 K/uL (06-17 @ 05:12)  Platelet Count - Automated: 275 K/uL (06-16 @ 05:32)      LIVER FUNCTIONS - ( 19 Jun 2021 09:27 )  Alb: 2.7 g/dL / Pro: 6.8 g/dL / ALK PHOS: 102 U/L / ALT: 102 U/L / AST: 118 U/L / GGT: x                 RADIOLOGY & ADDITIONAL STUDIES:

## 2021-06-19 NOTE — PROGRESS NOTE ADULT - SUBJECTIVE AND OBJECTIVE BOX
PROGRESS NOTE  Patient is a 75y old  Female who presents with a chief complaint of fever and dyspnea (19 Jun 2021 12:50)  Chart and available morning labs /imaging are reviewed electronically , urgent issues addressed . More information  is being added upon completion of rounds , when more information is collected and management discussed with consultants , medical staff and social service/case management on the floor   OVERNIGHT- AFEBRILE T(C): 36.1 (06-19-21 @ 12:00), Max: 37.6 (06-18-21 @ 16:00)  Another episode of rigors/rigidity given Versed which caused hypotension then placed on Levo and given IVF bolus. BP now resolved, off pressors.   HPI:  74 yo Female ,[facility] resident with hx of  PMH Frontotemporal dementia, CVA, chronic vent dependent respiratory failure, dysphagia s/p PEG, recurrent UTI/VAP presents with septic shock from [facility] facility. She was last admitted at [facility] 10/ 2020 with proteus UTI ,sepsis ,hypernatremia and RYAN .Pseudomonas grew in sputum resistant to zosyn last time Admitted for septic workup and evaluation,send blood and urine cx,serial lactate levels,monitor vitals closley,ivfs hydration,monitor urine output and renal profile,iv abx initiated e. On arrival to ER found to be febrile to 102, WBC 20k, lactate 6.7. CXR with haziness of entire right side, urine appears cloudy. Med hx is significant for Advanced dementia ,s/p  Aphasic stroke , Constipation  ,COVID-19   ,CVA (cerebral vascular accident)  ,Dementia of frontal lobe type  ,Diabetes mellitus  ,DM (diabetes mellitus)type  2   ,GERD (gastroesophageal reflux disease)  ,HTN (hypertension)  ,Hypertension  ,Pneumonia  ,Quadriplegia  ,Respiratory failure ,s/p tracheostomy and peg Palliative care consult requested ,to discuss advance directives and complete /update  MOLST  (15 Zay 2021 07:06)    PAST MEDICAL & SURGICAL HISTORY:  Dementia of frontal lobe type    Aphasic stroke    Diabetes mellitus    Respiratory failure    Hypertension    GERD (gastroesophageal reflux disease)    Constipation    Respiratory failure    CVA (cerebral vascular accident)    HTN (hypertension)    DM (diabetes mellitus)    Advanced dementia    COVID-19 virus detected    Quadriplegia    Pneumonia    Type II diabetes mellitus    Hx of appendectomy    Gastrostomy in place    Tracheostomy in place    Tracheostomy tube present    Feeding by G-tube        MEDICATIONS  (STANDING):  ALBUTerol    90 MICROgram(s) HFA Inhaler 2 Puff(s) Inhalation every 6 hours  aspirin  chewable 81 milliGRAM(s) Oral daily  bisacodyl 5 milliGRAM(s) Oral at bedtime  caspofungin IVPB      caspofungin IVPB 50 milliGRAM(s) IV Intermittent every 24 hours  chlorhexidine 2% Cloths 1 Application(s) Topical <User Schedule>  dextrose 40% Gel 15 Gram(s) Oral once  dextrose 5%. 1000 milliLiter(s) (50 mL/Hr) IV Continuous <Continuous>  dextrose 5%. 1000 milliLiter(s) (100 mL/Hr) IV Continuous <Continuous>  dextrose 50% Injectable 25 Gram(s) IV Push once  dextrose 50% Injectable 12.5 Gram(s) IV Push once  dextrose 50% Injectable 25 Gram(s) IV Push once  enoxaparin Injectable 40 milliGRAM(s) SubCutaneous daily  fentaNYL   Patch  12 MICROgram(s)/Hr 1 Patch Transdermal every 72 hours  glucagon  Injectable 1 milliGRAM(s) IntraMuscular once  insulin glargine Injectable (LANTUS) 20 Unit(s) SubCutaneous at bedtime  insulin lispro (ADMELOG) corrective regimen sliding scale   SubCutaneous every 6 hours  ipratropium 17 MICROgram(s) HFA Inhaler 1 Puff(s) Inhalation every 6 hours  meropenem  IVPB 1000 milliGRAM(s) IV Intermittent every 8 hours  norepinephrine Infusion 0.05 MICROgram(s)/kG/Min (4.97 mL/Hr) IV Continuous <Continuous>  pantoprazole   Suspension 40 milliGRAM(s) Oral before breakfast  polyethylene glycol 3350 17 Gram(s) Oral daily  senna 2 Tablet(s) Oral at bedtime  sucralfate suspension 1 Gram(s) Oral every 6 hours    MEDICATIONS  (PRN):  acetaminophen    Suspension .. 650 milliGRAM(s) Oral every 6 hours PRN Temp greater or equal to 38C (100.4F)      OBJECTIVE    T(C): 36.1 (06-19-21 @ 12:00), Max: 37.6 (06-18-21 @ 16:00)  HR: 68 (06-19-21 @ 14:16) (63 - 140)  BP: 145/65 (06-19-21 @ 12:00) (57/32 - 166/81)  RR: 20 (06-19-21 @ 12:00) (12 - 47)  SpO2: 99% (06-19-21 @ 14:16) (94% - 100%)  Wt(kg): --  I&O's Summary    18 Jun 2021 07:01  -  19 Jun 2021 07:00  --------------------------------------------------------  IN: 3770 mL / OUT: 800 mL / NET: 2970 mL    19 Jun 2021 07:01  -  19 Jun 2021 14:28  --------------------------------------------------------  IN: 444 mL / OUT: 0 mL / NET: 444 mL          REVIEW OF SYSTEMS:  Patient is  unable to provide any information/ROS  due to baseline mental status.     PHYSICAL EXAM: trach /vent  Appearance: NAD. VS past 24 hrs -as above   HEENT:   Moist oral mucosa. Conjunctiva clear b/l.   Neck : supple  Respiratory: Lungs CTAB.  Gastrointestinal:  Soft, nontender. No rebound. No rigidity. BS present  peg in place 	  Cardiovascular: RRR ,S1S2 present   Extremities: No edema. No erythema. No calf tenderness.  Skin: No rashes, No ecchymoses, No cyanosis.	  wounds ,skin lesions-See skin assesment flow sheet   LABS:                        10.7   9.62  )-----------( 261      ( 19 Jun 2021 09:27 )             35.4     06-19    143  |  109<H>  |  26<H>  ----------------------------<  152<H>  4.3   |  25  |  1.10    Ca    8.6      19 Jun 2021 09:27  Phos  2.5     06-19  Mg     2.8     06-19    TPro  6.8  /  Alb  2.7<L>  /  TBili  0.5  /  DBili  x   /  AST  118<H>  /  ALT  102<H>  /  AlkPhos  102  06-19    CAPILLARY BLOOD GLUCOSE      POCT Blood Glucose.: 141 mg/dL (19 Jun 2021 12:03)  POCT Blood Glucose.: 151 mg/dL (19 Jun 2021 05:41)  POCT Blood Glucose.: 97 mg/dL (18 Jun 2021 22:44)  POCT Blood Glucose.: 192 mg/dL (18 Jun 2021 17:46)          Culture - Blood (collected 18 Jun 2021 01:07)  Source: .Blood Blood-Venous  Preliminary Report (19 Jun 2021 02:01):    No growth to date.    Culture - Blood (collected 18 Jun 2021 01:07)  Source: .Blood Blood-Peripheral  Preliminary Report (19 Jun 2021 02:01):    No growth to date.    Culture - Urine (collected 15 Zay 2021 16:42)  Source: .Urine Catheterized  Final Report (16 Jun 2021 21:47):    50,000 - 99,000 CFU/mL Presumptive Candida albicans    "Susceptibilities not performed"    Culture - Sputum (collected 15 Zay 2021 16:28)  Source: .Sputum Sputum  Gram Stain (15 Zay 2021 18:57):    Few polymorphonuclear leukocytes per low power field    Few Squamous epithelial cells per low power field    Rare Gram Negative Rods per oil power field  Final Report (17 Jun 2021 17:45):    Normal Respiratory Elvira present    Culture - Blood (collected 15 Zay 2021 04:41)  Source: .Blood Blood-Peripheral  Preliminary Report (16 Jun 2021 05:01):    No growth to date.    Culture - Blood (collected 15 Zay 2021 04:41)  Source: .Blood Blood-Peripheral  Preliminary Report (16 Jun 2021 05:01):    No growth to date.      RADIOLOGY & ADDITIONAL TESTS:   reviewed elctronically  ASSESSMENT/PLAN: 	  25minutes spent on this visit, 50% visit time spent in care co-ordination with other attendings and counselling patient

## 2021-06-19 NOTE — PROGRESS NOTE ADULT - SUBJECTIVE AND OBJECTIVE BOX
Patient is a 75y old  Female who presents with a chief complaint of fever and dyspnea (19 Jun 2021 10:56)    24 hour events: Another episode of rigors/rigidity given Versed which caused hypotension then placed on Levo and given IVF bolus. BP now resolved, off pressors. Remains afebrile.     REVIEW OF SYSTEMS  Unable to assess 2/2 mental status     T(F): 98.1 (06-19-21 @ 04:42), Max: 99.7 (06-18-21 @ 16:00)  HR: 71 (06-19-21 @ 11:29) (63 - 140)  BP: 135/58 (06-19-21 @ 10:00) (57/32 - 166/81)  RR: 17 (06-19-21 @ 10:00) (12 - 47)  SpO2: 100% (06-19-21 @ 11:29) (94% - 100%)    Mode: AC/ CMV (Assist Control/ Continuous Mandatory Ventilation), RR (machine): 14, TV (machine): 400, FiO2: 25, PEEP: 5    I&O's Summary    06-18 @ 07:01  -  06-19 @ 07:00  --------------------------------------------------------  IN: 3670 mL / OUT: 800 mL / NET: 2870 mL    06-19 @ 07:01  -  06-19 @ 12:51  --------------------------------------------------------  IN: 4 mL / OUT: 0 mL / NET: 4 mL      PHYSICAL EXAM  General: Elderly, chronic trach to vent, ill-appearing  HEENT: Pupils equal, reactive to light. Symmetric.  PULM: Clear to auscultation bilaterally, no significant sputum production. No wheezes or rales.  CVS: Regular rate and rhythm, no murmurs, rubs, or gallops  ABD: Mild distention. Soft,, normoactive bowel sounds, no guarding. +PEG in place, c/d/i  EXT: No edema, nontender  SKIN: Warm and dry   NEURO: A&Ox0, opens eyes to voice, contracted UE > LE    MEDICATIONS  caspofungin IVPB   caspofungin IVPB IV Intermittent  meropenem  IVPB IV Intermittent    norepinephrine Infusion IV Continuous    dextrose 40% Gel Oral  dextrose 50% Injectable IV Push  dextrose 50% Injectable IV Push  dextrose 50% Injectable IV Push  glucagon  Injectable IntraMuscular  insulin glargine Injectable (LANTUS) SubCutaneous  insulin lispro (ADMELOG) corrective regimen sliding scale SubCutaneous    ALBUTerol    90 MICROgram(s) HFA Inhaler Inhalation  ipratropium 17 MICROgram(s) HFA Inhaler Inhalation    acetaminophen    Suspension .. Oral PRN  fentaNYL   Patch  12 MICROgram(s)/Hr Transdermal    aspirin  chewable Oral  enoxaparin Injectable SubCutaneous    bisacodyl Oral  pantoprazole   Suspension Oral  polyethylene glycol 3350 Oral  senna Oral  sucralfate suspension Oral    dextrose 5%. IV Continuous  dextrose 5%. IV Continuous    chlorhexidine 2% Cloths Topical                          10.7   9.62  )-----------( 261      ( 19 Jun 2021 09:27 )             35.4       06-19    143  |  109<H>  |  26<H>  ----------------------------<  152<H>  4.3   |  25  |  1.10    Ca    8.6      19 Jun 2021 09:27  Phos  2.5     06-19  Mg     2.8     06-19    TPro  6.8  /  Alb  2.7<L>  /  TBili  0.5  /  DBili  x   /  AST  118<H>  /  ALT  102<H>  /  AlkPhos  102  06-19    .Blood Blood-Peripheral   No growth to date. -- 06-18 @ 01:07  .Urine Catheterized   50,000 - 99,000 CFU/mL Presumptive Candida albicans  "Susceptibilities not performed" -- 06-15 @ 16:42  .Sputum Sputum   Normal Respiratory Elvira present   Few polymorphonuclear leukocytes per low power field  Few Squamous epithelial cells per low power field  Rare Gram Negative Rods per oil power field 06-15 @ 16:28  .Blood Blood-Peripheral   No growth to date. -- 06-15 @ 04:41    Rapid RVP Result: NotDetec (06-14 @ 23:02)    Radiology: N/A  Bedside lung ultrasound: N/A  Bedside ECHO: N/A    CENTRAL LINE: N  REID: N                        DATE REMOVED: 6/16    A-LINE: N    GLOBAL ISSUE/BEST PRACTICE  Analgesia: N  Sedation: N  CAM-ICU: Y  HOB elevation: yes  Stress ulcer prophylaxis: Y  VTE prophylaxis: Y  Glycemic control: Y  Nutrition: Y    CODE STATUS: FULL  GOC discussion: Y       Patient is a 75y old  Female who presents with a chief complaint of fever and dyspnea (19 Jun 2021 10:56)    24 hour events: Another episode of rigors/rigidity given Versed which caused hypotension then placed on Levo and given IVF bolus. BP now improved, off pressors. Remains afebrile.     REVIEW OF SYSTEMS  Unable to assess 2/2 mental status     T(F): 98.1 (06-19-21 @ 04:42), Max: 99.7 (06-18-21 @ 16:00)  HR: 71 (06-19-21 @ 11:29) (63 - 140)  BP: 135/58 (06-19-21 @ 10:00) (57/32 - 166/81)  RR: 17 (06-19-21 @ 10:00) (12 - 47)  SpO2: 100% (06-19-21 @ 11:29) (94% - 100%)    Mode: AC/ CMV (Assist Control/ Continuous Mandatory Ventilation), RR (machine): 14, TV (machine): 400, FiO2: 25, PEEP: 5    I&O's Summary    06-18 @ 07:01  -  06-19 @ 07:00  --------------------------------------------------------  IN: 3670 mL / OUT: 800 mL / NET: 2870 mL    06-19 @ 07:01  -  06-19 @ 12:51  --------------------------------------------------------  IN: 4 mL / OUT: 0 mL / NET: 4 mL      PHYSICAL EXAM  General: Elderly, chronic trach to vent, ill-appearing  HEENT: Pupils equal, reactive to light. Symmetric.  PULM: Clear to auscultation bilaterally, no significant sputum production. No wheezes or rales.  CVS: Regular rate and rhythm, no murmurs, rubs, or gallops  ABD: Mild distention. Soft,, normoactive bowel sounds, no guarding. +PEG in place, c/d/i  EXT: No edema, nontender  SKIN: Warm and dry   NEURO: opens eyes to voice, awake but not responsive, contracted UE > LE    MEDICATIONS  caspofungin IVPB   caspofungin IVPB IV Intermittent  meropenem  IVPB IV Intermittent    norepinephrine Infusion IV Continuous    dextrose 40% Gel Oral  dextrose 50% Injectable IV Push  dextrose 50% Injectable IV Push  dextrose 50% Injectable IV Push  glucagon  Injectable IntraMuscular  insulin glargine Injectable (LANTUS) SubCutaneous  insulin lispro (ADMELOG) corrective regimen sliding scale SubCutaneous    ALBUTerol    90 MICROgram(s) HFA Inhaler Inhalation  ipratropium 17 MICROgram(s) HFA Inhaler Inhalation    acetaminophen    Suspension .. Oral PRN  fentaNYL   Patch  12 MICROgram(s)/Hr Transdermal    aspirin  chewable Oral  enoxaparin Injectable SubCutaneous    bisacodyl Oral  pantoprazole   Suspension Oral  polyethylene glycol 3350 Oral  senna Oral  sucralfate suspension Oral    dextrose 5%. IV Continuous  dextrose 5%. IV Continuous    chlorhexidine 2% Cloths Topical                          10.7   9.62  )-----------( 261      ( 19 Jun 2021 09:27 )             35.4       06-19    143  |  109<H>  |  26<H>  ----------------------------<  152<H>  4.3   |  25  |  1.10    Ca    8.6      19 Jun 2021 09:27  Phos  2.5     06-19  Mg     2.8     06-19    TPro  6.8  /  Alb  2.7<L>  /  TBili  0.5  /  DBili  x   /  AST  118<H>  /  ALT  102<H>  /  AlkPhos  102  06-19    .Blood Blood-Peripheral   No growth to date. -- 06-18 @ 01:07  .Urine Catheterized   50,000 - 99,000 CFU/mL Presumptive Candida albicans  "Susceptibilities not performed" -- 06-15 @ 16:42  .Sputum Sputum   Normal Respiratory Elvira present   Few polymorphonuclear leukocytes per low power field  Few Squamous epithelial cells per low power field  Rare Gram Negative Rods per oil power field 06-15 @ 16:28  .Blood Blood-Peripheral   No growth to date. -- 06-15 @ 04:41    Rapid RVP Result: Agustinatec (06-14 @ 23:02)      CENTRAL LINE: N  REID: N                        DATE REMOVED: 6/16    A-LINE: N    GLOBAL ISSUE/BEST PRACTICE  Analgesia: N  Sedation: N  CAM-ICU: Y  HOB elevation: yes  Stress ulcer prophylaxis: Y  VTE prophylaxis: Y  Glycemic control: Y  Nutrition: Y    CODE STATUS: FULL

## 2021-06-19 NOTE — PROGRESS NOTE ADULT - ATTENDING COMMENTS
75F PMH Frontotemporal dementia, CVA, chronic vent dependent respiratory failure, bedbound, dysphagia s/p PEG, recurrent UTI/VAP presents with septic shock likely due to recurrent UTI vs. VAP, found to have Candida albicans UTI. Also with neutrophilic leukocytosis, RYAN, and lactic acidosis, now improved.  Continues to have episodes of ?rigors, tachypnea.    --mental status at baseline  pain control with fentanyl patch  --hypotension now resolved, likely from versed  monitor for now  continue ASA  --chronic respiratory failure  continue full vent support  --normal renal function  --dysphagia, continue PEG feeds  --?candidal UTI v pneumonia  continue meropenem and caspofungin   --DM2, continue lantus and ISS

## 2021-06-19 NOTE — PROGRESS NOTE ADULT - ASSESSMENT
74F PMH Frontotemporal dementia, CVA, chronic vent dependent respiratory failure, dysphagia s/p PEG, recurrent UTI/VAP presents with septic shock from [facility] facility. Source most likely urine, r/o PNA as well.     Neuro  - Baseline dementia from previous notes, unable to follow commands, but awake and alert.   - continue Fentanyl patch    CV  Hypotension likely 2/2 sepsis now resolving  - Hypotensive yesterday after given Versed for rigors/agitation. BP improved s/p IVF and pressors. Hypotension likely medication side effect and not worsening infection as she was afebrile at the time.     Pulm  r/o PNA  - Hx of VAP with Pseudomonal resistant to Zosyn but sensitive to Meropenem   - CT chest 6/15 shows Nonspecific patchy airspace opacities in the lower lobes which can be on an infectious basis or possibly related to aspiration. Residual trace left pleural effusion  - Tolerating vent settings well  - Home dose inhalers   - Sputum cultures shows normal alejandro, but rare gram neg rods.  - C/w Meropenem     GI  - continue PEG tube feeds  - continue bowel regimen senna and miralax, and Dulcolax to regimen    Renal  RYAN resolved  - Likely 2/2 hypotension in the setting of sepsis  - S/p IVF hydration  - Monitor UOP  - Trend renal indices    ID  Septic shock, h/o proteus mirabilis indol neg in urine and pseudomonas resistant to Zosyn   - Remains febrile, but with downtrending white count  - MRSA negative   - BCx 6/15 NGTD, rpt 6/17 pending  - UCx Candida albicans  - Sputum cultures shows normal alejandro, but rare gram neg rods.  - Continue Meropenem. Added Caspo 6/17  - Monitor hemodynamics and labs, F/u Fungitell lab    UTI  - Hx of proteus UTI  - UA borderline positive   - UCx Candida albicans  - Started Caspo     Heme  - DVT ppx, HSQ switched to lovenox    Endo  Type 2 DM  - corrective scale  - continue Lantus 20qhs     Dispo: Continues to require ICU level care  Full code

## 2021-06-19 NOTE — PROGRESS NOTE ADULT - SUBJECTIVE AND OBJECTIVE BOX
[facility], Division of Infectious Diseases  MOIZ Lora Y. Patel, S. Shah  [phone]  ([phone] - weekdays after 5pm and weekends)    Name: BREA BECKHAM  Age/Gender: 75y Female  MRN: [id number]    Interval History:  Patient seen and examined at bedside this morning.  Remains on vent, awake but not following.   Notes reviewed. No concerning overnight events  Afebrile     Allergies: codeine (Hives)    Objective:  Vitals:   T(F): 98.1 (06-19-21 @ 04:42), Max: 99.7 (06-18-21 @ 16:00)  HR: 69 (06-19-21 @ 09:00) (63 - 140)  BP: 120/59 (06-19-21 @ 09:00) (57/32 - 166/81)  RR: 19 (06-19-21 @ 09:00) (12 - 47)  SpO2: 98% (06-19-21 @ 09:00) (94% - 100%)  Physical Examination:  General: no acute distress  HEENT: NC/AT, anicteric, neck supple, trach to vent  Cardio: S1, S2 heard, RRR, no murmurs  Resp: mechanical breath sounds b/l  Abd: soft, no grimacing on palpation  Neuro: awake, not following   Ext: no edema or cyanosis  Skin: warm, dry, no visible rash    Laboratory Studies:  CBC:                       10.7   9.62  )-----------( 261      ( 19 Jun 2021 09:27 )             35.4     WBC Trend:  9.62 06-19-21 @ 09:27  8.10 06-18-21 @ 05:30  11.38 06-17-21 @ 05:12  11.26 06-16-21 @ 05:32  12.57 06-15-21 @ 05:22  24.34 06-14-21 @ 23:02    CMP: 06-18    143  |  112<H>  |  24<H>  ----------------------------<  143<H>  4.2   |  29  |  0.83    Ca    8.5      18 Jun 2021 05:30  Phos  2.1     06-18  Mg     2.5     06-18    TPro  6.4  /  Alb  2.5<L>  /  TBili  0.4  /  DBili  x   /  AST  42<H>  /  ALT  32  /  AlkPhos  89  06-18    LIVER FUNCTIONS - ( 18 Jun 2021 05:30 )  Alb: 2.5 g/dL / Pro: 6.4 g/dL / ALK PHOS: 89 U/L / ALT: 32 U/L / AST: 42 U/L / GGT: x           Microbiology: reviewed   Culture - Blood (collected 06-18-21 @ 01:07)  Source: .Blood Blood-Venous  Preliminary Report (06-19-21 @ 02:01):    No growth to date.    Culture - Blood (collected 06-18-21 @ 01:07)  Source: .Blood Blood-Peripheral  Preliminary Report (06-19-21 @ 02:01):    No growth to date.    Culture - Urine (collected 06-15-21 @ 16:42)  Source: .Urine Catheterized  Final Report (06-16-21 @ 21:47):    50,000 - 99,000 CFU/mL Presumptive Candida albicans    "Susceptibilities not performed"    Culture - Sputum (collected 06-15-21 @ 16:28)  Source: .Sputum Sputum  Gram Stain (06-15-21 @ 18:57):    Few polymorphonuclear leukocytes per low power field    Few Squamous epithelial cells per low power field    Rare Gram Negative Rods per oil power field  Final Report (06-17-21 @ 17:45):    Normal Respiratory Elvira present    Culture - Blood (collected 06-15-21 @ 04:41)  Source: .Blood Blood-Peripheral  Preliminary Report (06-16-21 @ 05:01):    No growth to date.    Culture - Blood (collected 06-15-21 @ 04:41)  Source: .Blood Blood-Peripheral  Preliminary Report (06-16-21 @ 05:01):    No growth to date.    Radiology: reviewed     Medications:  acetaminophen    Suspension .. 650 milliGRAM(s) Oral every 6 hours PRN  ALBUTerol    90 MICROgram(s) HFA Inhaler 2 Puff(s) Inhalation every 6 hours  aspirin  chewable 81 milliGRAM(s) Oral daily  bisacodyl 5 milliGRAM(s) Oral at bedtime  caspofungin IVPB      caspofungin IVPB 50 milliGRAM(s) IV Intermittent every 24 hours  chlorhexidine 2% Cloths 1 Application(s) Topical <User Schedule>  dextrose 40% Gel 15 Gram(s) Oral once  dextrose 5%. 1000 milliLiter(s) IV Continuous <Continuous>  dextrose 5%. 1000 milliLiter(s) IV Continuous <Continuous>  dextrose 50% Injectable 25 Gram(s) IV Push once  dextrose 50% Injectable 12.5 Gram(s) IV Push once  dextrose 50% Injectable 25 Gram(s) IV Push once  enoxaparin Injectable 40 milliGRAM(s) SubCutaneous daily  fentaNYL   Patch  12 MICROgram(s)/Hr 1 Patch Transdermal every 72 hours  glucagon  Injectable 1 milliGRAM(s) IntraMuscular once  insulin glargine Injectable (LANTUS) 20 Unit(s) SubCutaneous at bedtime  insulin lispro (ADMELOG) corrective regimen sliding scale   SubCutaneous every 6 hours  ipratropium 17 MICROgram(s) HFA Inhaler 1 Puff(s) Inhalation every 6 hours  meropenem  IVPB 1000 milliGRAM(s) IV Intermittent every 8 hours  norepinephrine Infusion 0.05 MICROgram(s)/kG/Min IV Continuous <Continuous>  pantoprazole   Suspension 40 milliGRAM(s) Oral before breakfast  polyethylene glycol 3350 17 Gram(s) Oral daily  senna 2 Tablet(s) Oral at bedtime  sucralfate suspension 1 Gram(s) Oral every 6 hours    Antimicrobials:  caspofungin IVPB      caspofungin IVPB 50 milliGRAM(s) IV Intermittent every 24 hours  meropenem  IVPB 1000 milliGRAM(s) IV Intermittent every 8 hours   [facility], Division of Infectious Diseases  MOIZ Lora Y. Patel, S. Shah  [phone]  ([phone] - weekdays after 5pm and weekends)    Name: BREA BECKHAM  Age/Gender: 75y Female  MRN: [id number]    Interval History:  Patient seen and examined at bedside this morning.  Remains on vent, awake but not following.   Notes reviewed. No concerning overnight events  Afebrile, on pressor support    Allergies: codeine (Hives)    Objective:  Vitals:   T(F): 98.1 (06-19-21 @ 04:42), Max: 99.7 (06-18-21 @ 16:00)  HR: 69 (06-19-21 @ 09:00) (63 - 140)  BP: 120/59 (06-19-21 @ 09:00) (57/32 - 166/81)  RR: 19 (06-19-21 @ 09:00) (12 - 47)  SpO2: 98% (06-19-21 @ 09:00) (94% - 100%)  Physical Examination:  General: no acute distress  HEENT: NC/AT, anicteric, neck supple, trach to vent  Cardio: S1, S2 heard, RRR, no murmurs  Resp: mechanical breath sounds b/l  Abd: soft, no grimacing on palpation  Neuro: awake, not following   Ext: no edema or cyanosis  Skin: warm, dry, no visible rash    Laboratory Studies:  CBC:                       10.7   9.62  )-----------( 261      ( 19 Jun 2021 09:27 )             35.4     WBC Trend:  9.62 06-19-21 @ 09:27  8.10 06-18-21 @ 05:30  11.38 06-17-21 @ 05:12  11.26 06-16-21 @ 05:32  12.57 06-15-21 @ 05:22  24.34 06-14-21 @ 23:02    CMP: 06-18    143  |  112<H>  |  24<H>  ----------------------------<  143<H>  4.2   |  29  |  0.83    Ca    8.5      18 Jun 2021 05:30  Phos  2.1     06-18  Mg     2.5     06-18    TPro  6.4  /  Alb  2.5<L>  /  TBili  0.4  /  DBili  x   /  AST  42<H>  /  ALT  32  /  AlkPhos  89  06-18    LIVER FUNCTIONS - ( 18 Jun 2021 05:30 )  Alb: 2.5 g/dL / Pro: 6.4 g/dL / ALK PHOS: 89 U/L / ALT: 32 U/L / AST: 42 U/L / GGT: x           Microbiology: reviewed   Culture - Blood (collected 06-18-21 @ 01:07)  Source: .Blood Blood-Venous  Preliminary Report (06-19-21 @ 02:01):    No growth to date.    Culture - Blood (collected 06-18-21 @ 01:07)  Source: .Blood Blood-Peripheral  Preliminary Report (06-19-21 @ 02:01):    No growth to date.    Culture - Urine (collected 06-15-21 @ 16:42)  Source: .Urine Catheterized  Final Report (06-16-21 @ 21:47):    50,000 - 99,000 CFU/mL Presumptive Candida albicans    "Susceptibilities not performed"    Culture - Sputum (collected 06-15-21 @ 16:28)  Source: .Sputum Sputum  Gram Stain (06-15-21 @ 18:57):    Few polymorphonuclear leukocytes per low power field    Few Squamous epithelial cells per low power field    Rare Gram Negative Rods per oil power field  Final Report (06-17-21 @ 17:45):    Normal Respiratory Elvira present    Culture - Blood (collected 06-15-21 @ 04:41)  Source: .Blood Blood-Peripheral  Preliminary Report (06-16-21 @ 05:01):    No growth to date.    Culture - Blood (collected 06-15-21 @ 04:41)  Source: .Blood Blood-Peripheral  Preliminary Report (06-16-21 @ 05:01):    No growth to date.    Radiology: reviewed     Medications:  acetaminophen    Suspension .. 650 milliGRAM(s) Oral every 6 hours PRN  ALBUTerol    90 MICROgram(s) HFA Inhaler 2 Puff(s) Inhalation every 6 hours  aspirin  chewable 81 milliGRAM(s) Oral daily  bisacodyl 5 milliGRAM(s) Oral at bedtime  caspofungin IVPB      caspofungin IVPB 50 milliGRAM(s) IV Intermittent every 24 hours  chlorhexidine 2% Cloths 1 Application(s) Topical <User Schedule>  dextrose 40% Gel 15 Gram(s) Oral once  dextrose 5%. 1000 milliLiter(s) IV Continuous <Continuous>  dextrose 5%. 1000 milliLiter(s) IV Continuous <Continuous>  dextrose 50% Injectable 25 Gram(s) IV Push once  dextrose 50% Injectable 12.5 Gram(s) IV Push once  dextrose 50% Injectable 25 Gram(s) IV Push once  enoxaparin Injectable 40 milliGRAM(s) SubCutaneous daily  fentaNYL   Patch  12 MICROgram(s)/Hr 1 Patch Transdermal every 72 hours  glucagon  Injectable 1 milliGRAM(s) IntraMuscular once  insulin glargine Injectable (LANTUS) 20 Unit(s) SubCutaneous at bedtime  insulin lispro (ADMELOG) corrective regimen sliding scale   SubCutaneous every 6 hours  ipratropium 17 MICROgram(s) HFA Inhaler 1 Puff(s) Inhalation every 6 hours  meropenem  IVPB 1000 milliGRAM(s) IV Intermittent every 8 hours  norepinephrine Infusion 0.05 MICROgram(s)/kG/Min IV Continuous <Continuous>  pantoprazole   Suspension 40 milliGRAM(s) Oral before breakfast  polyethylene glycol 3350 17 Gram(s) Oral daily  senna 2 Tablet(s) Oral at bedtime  sucralfate suspension 1 Gram(s) Oral every 6 hours    Antimicrobials:  caspofungin IVPB      caspofungin IVPB 50 milliGRAM(s) IV Intermittent every 24 hours  meropenem  IVPB 1000 milliGRAM(s) IV Intermittent every 8 hours

## 2021-06-20 LAB
ALBUMIN SERPL ELPH-MCNC: 2.5 G/DL — LOW (ref 3.3–5)
ALP SERPL-CCNC: 93 U/L — SIGNIFICANT CHANGE UP (ref 40–120)
ALT FLD-CCNC: 99 U/L — HIGH (ref 12–78)
ANION GAP SERPL CALC-SCNC: 7 MMOL/L — SIGNIFICANT CHANGE UP (ref 5–17)
AST SERPL-CCNC: 94 U/L — HIGH (ref 15–37)
BASOPHILS # BLD AUTO: 0.02 K/UL — SIGNIFICANT CHANGE UP (ref 0–0.2)
BASOPHILS NFR BLD AUTO: 0.4 % — SIGNIFICANT CHANGE UP (ref 0–2)
BILIRUB SERPL-MCNC: 0.3 MG/DL — SIGNIFICANT CHANGE UP (ref 0.2–1.2)
BUN SERPL-MCNC: 25 MG/DL — HIGH (ref 7–23)
CALCIUM SERPL-MCNC: 8.4 MG/DL — LOW (ref 8.5–10.1)
CHLORIDE SERPL-SCNC: 109 MMOL/L — HIGH (ref 96–108)
CO2 SERPL-SCNC: 27 MMOL/L — SIGNIFICANT CHANGE UP (ref 22–31)
CREAT SERPL-MCNC: 0.87 MG/DL — SIGNIFICANT CHANGE UP (ref 0.5–1.3)
CULTURE RESULTS: SIGNIFICANT CHANGE UP
CULTURE RESULTS: SIGNIFICANT CHANGE UP
EOSINOPHIL # BLD AUTO: 0.19 K/UL — SIGNIFICANT CHANGE UP (ref 0–0.5)
EOSINOPHIL NFR BLD AUTO: 3.5 % — SIGNIFICANT CHANGE UP (ref 0–6)
GLUCOSE SERPL-MCNC: 106 MG/DL — HIGH (ref 70–99)
HCT VFR BLD CALC: 32 % — LOW (ref 34.5–45)
HGB BLD-MCNC: 9.8 G/DL — LOW (ref 11.5–15.5)
IMM GRANULOCYTES NFR BLD AUTO: 0.7 % — SIGNIFICANT CHANGE UP (ref 0–1.5)
LYMPHOCYTES # BLD AUTO: 1.89 K/UL — SIGNIFICANT CHANGE UP (ref 1–3.3)
LYMPHOCYTES # BLD AUTO: 34.6 % — SIGNIFICANT CHANGE UP (ref 13–44)
MAGNESIUM SERPL-MCNC: 2.6 MG/DL — SIGNIFICANT CHANGE UP (ref 1.6–2.6)
MCHC RBC-ENTMCNC: 27.6 PG — SIGNIFICANT CHANGE UP (ref 27–34)
MCHC RBC-ENTMCNC: 30.6 GM/DL — LOW (ref 32–36)
MCV RBC AUTO: 90.1 FL — SIGNIFICANT CHANGE UP (ref 80–100)
MONOCYTES # BLD AUTO: 0.64 K/UL — SIGNIFICANT CHANGE UP (ref 0–0.9)
MONOCYTES NFR BLD AUTO: 11.7 % — SIGNIFICANT CHANGE UP (ref 2–14)
NEUTROPHILS # BLD AUTO: 2.68 K/UL — SIGNIFICANT CHANGE UP (ref 1.8–7.4)
NEUTROPHILS NFR BLD AUTO: 49.1 % — SIGNIFICANT CHANGE UP (ref 43–77)
NRBC # BLD: 0 /100 WBCS — SIGNIFICANT CHANGE UP (ref 0–0)
PHOSPHATE SERPL-MCNC: 2.1 MG/DL — LOW (ref 2.5–4.5)
PLATELET # BLD AUTO: 261 K/UL — SIGNIFICANT CHANGE UP (ref 150–400)
POTASSIUM SERPL-MCNC: 4.5 MMOL/L — SIGNIFICANT CHANGE UP (ref 3.5–5.3)
POTASSIUM SERPL-SCNC: 4.5 MMOL/L — SIGNIFICANT CHANGE UP (ref 3.5–5.3)
PROT SERPL-MCNC: 6.1 G/DL — SIGNIFICANT CHANGE UP (ref 6–8.3)
RBC # BLD: 3.55 M/UL — LOW (ref 3.8–5.2)
RBC # FLD: 15.3 % — HIGH (ref 10.3–14.5)
SODIUM SERPL-SCNC: 143 MMOL/L — SIGNIFICANT CHANGE UP (ref 135–145)
SPECIMEN SOURCE: SIGNIFICANT CHANGE UP
SPECIMEN SOURCE: SIGNIFICANT CHANGE UP
WBC # BLD: 5.38 K/UL — SIGNIFICANT CHANGE UP (ref 3.8–10.5)
WBC # FLD AUTO: 5.38 K/UL — SIGNIFICANT CHANGE UP (ref 3.8–10.5)

## 2021-06-20 PROCEDURE — 99291 CRITICAL CARE FIRST HOUR: CPT

## 2021-06-20 RX ORDER — MIDODRINE HYDROCHLORIDE 2.5 MG/1
10 TABLET ORAL THREE TIMES A DAY
Refills: 0 | Status: DISCONTINUED | OUTPATIENT
Start: 2021-06-20 | End: 2021-06-21

## 2021-06-20 RX ORDER — POLYETHYLENE GLYCOL 3350 17 G/17G
17 POWDER, FOR SOLUTION ORAL EVERY 12 HOURS
Refills: 0 | Status: DISCONTINUED | OUTPATIENT
Start: 2021-06-20 | End: 2021-06-23

## 2021-06-20 RX ORDER — MIDODRINE HYDROCHLORIDE 2.5 MG/1
10 TABLET ORAL EVERY 8 HOURS
Refills: 0 | Status: DISCONTINUED | OUTPATIENT
Start: 2021-06-20 | End: 2021-06-20

## 2021-06-20 RX ADMIN — Medication 5 MILLIGRAM(S): at 21:33

## 2021-06-20 RX ADMIN — ALBUTEROL 2 PUFF(S): 90 AEROSOL, METERED ORAL at 14:54

## 2021-06-20 RX ADMIN — Medication 1 GRAM(S): at 17:29

## 2021-06-20 RX ADMIN — Medication 1 GRAM(S): at 11:50

## 2021-06-20 RX ADMIN — ALBUTEROL 2 PUFF(S): 90 AEROSOL, METERED ORAL at 08:16

## 2021-06-20 RX ADMIN — Medication 81 MILLIGRAM(S): at 11:50

## 2021-06-20 RX ADMIN — Medication 1 PUFF(S): at 08:16

## 2021-06-20 RX ADMIN — ALBUTEROL 2 PUFF(S): 90 AEROSOL, METERED ORAL at 00:22

## 2021-06-20 RX ADMIN — MEROPENEM 100 MILLIGRAM(S): 1 INJECTION INTRAVENOUS at 05:04

## 2021-06-20 RX ADMIN — Medication 1 PUFF(S): at 00:22

## 2021-06-20 RX ADMIN — CHLORHEXIDINE GLUCONATE 1 APPLICATION(S): 213 SOLUTION TOPICAL at 05:04

## 2021-06-20 RX ADMIN — SENNA PLUS 2 TABLET(S): 8.6 TABLET ORAL at 21:33

## 2021-06-20 RX ADMIN — MEROPENEM 100 MILLIGRAM(S): 1 INJECTION INTRAVENOUS at 13:09

## 2021-06-20 RX ADMIN — Medication 1 MILLIGRAM(S): at 12:19

## 2021-06-20 RX ADMIN — Medication 1 PUFF(S): at 21:04

## 2021-06-20 RX ADMIN — MIDODRINE HYDROCHLORIDE 10 MILLIGRAM(S): 2.5 TABLET ORAL at 17:32

## 2021-06-20 RX ADMIN — Medication 1 PUFF(S): at 14:54

## 2021-06-20 RX ADMIN — Medication 2: at 11:49

## 2021-06-20 RX ADMIN — INSULIN GLARGINE 20 UNIT(S): 100 INJECTION, SOLUTION SUBCUTANEOUS at 23:24

## 2021-06-20 RX ADMIN — ALBUTEROL 2 PUFF(S): 90 AEROSOL, METERED ORAL at 21:04

## 2021-06-20 RX ADMIN — PANTOPRAZOLE SODIUM 40 MILLIGRAM(S): 20 TABLET, DELAYED RELEASE ORAL at 05:04

## 2021-06-20 RX ADMIN — Medication 1 MILLIGRAM(S): at 11:50

## 2021-06-20 RX ADMIN — FENTANYL CITRATE 1 PATCH: 50 INJECTION INTRAVENOUS at 19:25

## 2021-06-20 RX ADMIN — Medication 1 GRAM(S): at 05:04

## 2021-06-20 RX ADMIN — CASPOFUNGIN ACETATE 260 MILLIGRAM(S): 7 INJECTION, POWDER, LYOPHILIZED, FOR SOLUTION INTRAVENOUS at 12:18

## 2021-06-20 RX ADMIN — MIDODRINE HYDROCHLORIDE 10 MILLIGRAM(S): 2.5 TABLET ORAL at 13:09

## 2021-06-20 RX ADMIN — ENOXAPARIN SODIUM 40 MILLIGRAM(S): 100 INJECTION SUBCUTANEOUS at 11:50

## 2021-06-20 RX ADMIN — MEROPENEM 100 MILLIGRAM(S): 1 INJECTION INTRAVENOUS at 21:33

## 2021-06-20 RX ADMIN — Medication 1 GRAM(S): at 23:24

## 2021-06-20 RX ADMIN — FENTANYL CITRATE 1 PATCH: 50 INJECTION INTRAVENOUS at 07:00

## 2021-06-20 NOTE — PROGRESS NOTE ADULT - ASSESSMENT
74 yo Female ,[facility] resident with hx of  PMH Frontotemporal dementia, CVA, chronic vent dependent respiratory failure, dysphagia s/p PEG, recurrent UTI/VAP presents with septic shock from [facility] facility. She was last admitted at [facility] 10/ 2020 with proteus UTI ,sepsis ,hypernatremia and RYAN .Pseudomonas grew in sputum resistant to zosyn last time Admitted for septic workup and evaluation,send blood and urine cx,serial lactate levels,monitor vitals closley,ivfs hydration,monitor urine output and renal profile,iv abx initiated e. On arrival to ER found to be febrile to 102, WBC 20k, lactate 6.7. CXR with haziness of entire right side, urine appears cloudy. Med hx is significant for Advanced dementia ,s/p  Aphasic stroke , Constipation  ,COVID-19   ,CVA (cerebral vascular accident)  ,Dementia of frontal lobe type  ,Diabetes mellitus  ,DM (diabetes mellitus)  ,GERD (gastroesophageal reflux disease)  ,HTN (hypertension)  ,Hypertension  ,Pneumonia  ,Quadriplegia  ,Respiratory failure ,s/p tracheostomy and peg Palliative care consult requested ,to discuss advance directives and complete /update  [facility]

## 2021-06-20 NOTE — PROGRESS NOTE ADULT - ATTENDING COMMENTS
75F PMH Frontotemporal dementia, CVA, chronic vent dependent respiratory failure, bedbound, dysphagia s/p PEG, recurrent UTI/VAP presents with septic shock likely due to recurrent UTI vs. VAP, found to have Candida albicans UTI, complicated by RYAN, and lactic acidosis, now improved.  Continues to have episodes of ?rigors, tachypnea, which family says have been a chronic problem which they believe is related to pain.    --mental status at baseline  pain control with fentanyl patch  --hypotension, on/off low dose of levophed, likely distributive shock from meds  start midodrine, continue ASA  --chronic respiratory failure  continue full vent support  discussed episodes of tachypnea, tachycardia, htn with family, she has had these several in the past and they attributed them to pain of some sort (from constipation or pain triggered by turning for routine care), however they seem to respond better to ativan rather than narcotics,   --normal renal function  --dysphagia, continue PEG feeds  having BMs, but will increase miralax to q12h  --?candidal UTI v pneumonia  continue meropenem and short course of caspofungin   --DM2, continue lantus and ISS  --plan discussed with  and daughter in law

## 2021-06-20 NOTE — PROGRESS NOTE ADULT - ASSESSMENT
74F PMH Frontotemporal dementia, CVA, chronic vent dependent respiratory failure, dysphagia s/p PEG, recurrent UTI/VAP presents with septic shock from [facility] facility. Source most likely urine, r/o PNA as well.     Neuro  - Baseline dementia from previous notes, unable to follow commands, but awake and alert.   - continue Fentanyl patch    CV  Hypotension likely 2/2 sepsis now resolving  - Hypotensive yesterday after given Versed for rigors/agitation. BP improved s/p IVF and pressors. Hypotension likely medication side effect and not worsening infection as she was afebrile at the time.     Pulm  r/o PNA  - Hx of VAP with Pseudomonal resistant to Zosyn but sensitive to Meropenem   - CT chest 6/15 shows Nonspecific patchy airspace opacities in the lower lobes which can be on an infectious basis or possibly related to aspiration. Residual trace left pleural effusion  - Tolerating vent settings well  - Home dose inhalers   - Sputum cultures shows normal alejandro, but rare gram neg rods.  - C/w Meropenem     GI  - continue PEG tube feeds  - continue bowel regimen senna and miralax, and Dulcolax to regimen    Renal  RYAN resolved  - Likely 2/2 hypotension in the setting of sepsis  - S/p IVF hydration  - Monitor UOP  - Trend renal indices    ID  Septic shock, h/o proteus mirabilis indol neg in urine and pseudomonas resistant to Zosyn   - Remains febrile, but with downtrending white count  - MRSA negative   - BCx 6/15 NGTD, rpt 6/17 pending  - UCx Candida albicans  - Sputum cultures shows normal alejandro, but rare gram neg rods.  - Continue Meropenem. Added Caspo 6/17  - Monitor hemodynamics and labs, F/u Fungitell lab    UTI  - Hx of proteus UTI  - UA borderline positive   - UCx Candida albicans  - Started Caspo     Heme  - DVT ppx, HSQ switched to lovenox    Endo  Type 2 DM  - corrective scale  - continue Lantus 20qhs     Dispo: Continues to require ICU level care  Full code 74F PMH Frontotemporal dementia, CVA, chronic vent dependent respiratory failure, dysphagia s/p PEG, recurrent UTI/VAP presents with septic shock from [facility] facility. Source most likely urine, r/o PNA as well.     Neuro  - Baseline dementia from previous notes, unable to follow commands, but awake and alert.   - continue Fentanyl patch    CV  -Hypotension 2/2 sepsis now resolving  -Episode of hypotension O/N requiring short course of levo, likely 2/2 ativan for rigors, tachypnea, agitation, afebrile at the time.     Pulm  r/o PNA  - Hx of VAP with Pseudomonal resistant to Zosyn but sensitive to Meropenem   - CT chest 6/15 shows Nonspecific patchy airspace opacities in the lower lobes which can be on an infectious basis or possibly related to aspiration. Residual trace left pleural effusion  - Tolerating vent settings well  - Home dose inhalers   - Sputum cultures shows normal alejandro, but rare gram neg rods.  - C/w Meropenem     GI  - continue PEG tube feeds  - continue bowel regimen senna and miralax, and Dulcolax to regimen    Renal  RYAN resolved  - Likely 2/2 hypotension in the setting of sepsis  - Trend renal indices    ID  Septic shock, h/o proteus mirabilis indol neg in urine and pseudomonas resistant to Zosyn  - MRSA negative   - BCx 6/15 NGTD, rpt 6/17 NGTD  - UCx Candida albicans  - Sputum cultures shows normal alejandro, but rare gram neg rods.  - Continue Meropenem. Added Caspo 6/17  - Fungitell negative but will keep caspo for short course (5 day) as patient appears to be clinically approving s/p initiation (downtrending wbc, afebrile for 24+ hours)    Heme  - DVT ppx, continue lovenox    Endo  Type 2 DM  - corrective scale  - continue Lantus 20qhs     Dispo: Remains full code 74F PMH Frontotemporal dementia, CVA, chronic vent dependent respiratory failure, dysphagia s/p PEG, recurrent UTI/VAP presents with septic shock from [facility] facility. Source most likely urine, r/o PNA as well.     Neuro  - Baseline dementia from previous notes, unable to follow commands, but awake and alert.   - continue Fentanyl patch    CV  -Hypotension 2/2 sepsis now resolving  -Episode of hypotension O/N requiring short course of levo, likely 2/2 ativan for rigors, tachypnea, agitation, afebrile at the time.     Pulm  r/o PNA  - Hx of VAP with Pseudomonal resistant to Zosyn but sensitive to Meropenem   - CT chest 6/15 shows Nonspecific patchy airspace opacities in the lower lobes which can be on an infectious basis or possibly related to aspiration. Residual trace left pleural effusion  - Tolerating vent settings well  - Home dose inhalers   - Sputum cultures shows normal alejandro, but rare gram neg rods.  - C/w Meropenem     GI  - continue PEG tube feeds  - continue bowel regimen senna and miralax, and Dulcolax to regimen    Renal  RYAN resolved  - Likely 2/2 hypotension in the setting of sepsis  - Trend renal indices    ID  Septic shock, h/o proteus mirabilis indol neg in urine and pseudomonas resistant to Zosyn  - MRSA negative   - BCx 6/15 NGTD, rpt 6/17 NGTD  - UCx Candida albicans  - Sputum cultures shows normal alejandro, but rare gram neg rods.  - Continue Meropenem. Added Caspo 6/17  - Fungitell negative but will keep caspo for short course (5 day) as patient appears to be clinically approving s/p initiation (downtrending wbc, afebrile for 24+ hours)    Heme  - DVT ppx, continue lovenox    Endo  Type 2 DM  - corrective scale  - continue Lantus 20qhs     Dispo: Remains full code. Spoke to  at bedside 6/20, updated on patient status, and answered all questions

## 2021-06-20 NOTE — PROGRESS NOTE ADULT - PROBLEM SELECTOR PLAN 1
2/2 to UTI with candida -Caspofungin added 06/17 and for   PNA - on Meropenem .On Levophed due to reoccurence of hypotension  .

## 2021-06-20 NOTE — PROGRESS NOTE ADULT - SUBJECTIVE AND OBJECTIVE BOX
Date/Time Patient Seen:  		  Referring MD:   Data Reviewed	       Patient is a 75y old  Female who presents with a chief complaint of fever and dyspnea (19 Jun 2021 12:50)      Subjective/HPI     PAST MEDICAL & SURGICAL HISTORY:  Dementia of frontal lobe type    Aphasic stroke    Diabetes mellitus    Respiratory failure    Hypertension    GERD (gastroesophageal reflux disease)    Constipation    Respiratory failure    CVA (cerebral vascular accident)    HTN (hypertension)    DM (diabetes mellitus)    Advanced dementia    COVID-19 virus detected    Quadriplegia    Pneumonia    Type II diabetes mellitus    Hx of appendectomy    Gastrostomy in place    Tracheostomy in place    Tracheostomy tube present    Feeding by G-tube          Medication list         MEDICATIONS  (STANDING):  ALBUTerol    90 MICROgram(s) HFA Inhaler 2 Puff(s) Inhalation every 6 hours  aspirin  chewable 81 milliGRAM(s) Oral daily  bisacodyl 5 milliGRAM(s) Oral at bedtime  caspofungin IVPB      caspofungin IVPB 50 milliGRAM(s) IV Intermittent every 24 hours  chlorhexidine 2% Cloths 1 Application(s) Topical <User Schedule>  dextrose 40% Gel 15 Gram(s) Oral once  dextrose 5%. 1000 milliLiter(s) (50 mL/Hr) IV Continuous <Continuous>  dextrose 5%. 1000 milliLiter(s) (100 mL/Hr) IV Continuous <Continuous>  dextrose 50% Injectable 25 Gram(s) IV Push once  dextrose 50% Injectable 12.5 Gram(s) IV Push once  dextrose 50% Injectable 25 Gram(s) IV Push once  enoxaparin Injectable 40 milliGRAM(s) SubCutaneous daily  fentaNYL   Patch  12 MICROgram(s)/Hr 1 Patch Transdermal every 72 hours  glucagon  Injectable 1 milliGRAM(s) IntraMuscular once  insulin glargine Injectable (LANTUS) 20 Unit(s) SubCutaneous at bedtime  insulin lispro (ADMELOG) corrective regimen sliding scale   SubCutaneous every 6 hours  ipratropium 17 MICROgram(s) HFA Inhaler 1 Puff(s) Inhalation every 6 hours  meropenem  IVPB 1000 milliGRAM(s) IV Intermittent every 8 hours  norepinephrine Infusion 0.05 MICROgram(s)/kG/Min (4.97 mL/Hr) IV Continuous <Continuous>  pantoprazole   Suspension 40 milliGRAM(s) Oral before breakfast  polyethylene glycol 3350 17 Gram(s) Oral daily  senna 2 Tablet(s) Oral at bedtime  sucralfate suspension 1 Gram(s) Oral every 6 hours    MEDICATIONS  (PRN):  acetaminophen    Suspension .. 650 milliGRAM(s) Oral every 6 hours PRN Temp greater or equal to 38C (100.4F)         Vitals log        ICU Vital Signs Last 24 Hrs  T(C): 37.4 (20 Jun 2021 00:00), Max: 37.4 (20 Jun 2021 00:00)  T(F): 99.3 (20 Jun 2021 00:00), Max: 99.3 (20 Jun 2021 00:00)  HR: 63 (20 Jun 2021 04:22) (59 - 121)  BP: 123/60 (20 Jun 2021 04:00) (68/38 - 175/74)  BP(mean): 87 (20 Jun 2021 04:00) (48 - 112)  ABP: --  ABP(mean): --  RR: 18 (20 Jun 2021 04:00) (12 - 45)  SpO2: 97% (20 Jun 2021 04:22) (90% - 100%)       Mode: AC/ CMV (Assist Control/ Continuous Mandatory Ventilation)  RR (machine): 14  TV (machine): 400  FiO2: 25  PEEP: 5  ITime: 1  MAP: 10  PIP: 24      Input and Output:  I&O's Detail    18 Jun 2021 07:01  -  19 Jun 2021 07:00  --------------------------------------------------------  IN:    Enteral Tube Flush: 680 mL    Free Water: 1490 mL    Glucerna 1.5: 1100 mL    IV PiggyBack: 150 mL    IV PiggyBack: 250 mL    IV PiggyBack: 100 mL  Total IN: 3770 mL    OUT:    Voided (mL): 800 mL  Total OUT: 800 mL    Total NET: 2970 mL      19 Jun 2021 07:01  -  20 Jun 2021 06:03  --------------------------------------------------------  IN:    Enteral Tube Flush: 140 mL    Free Water: 1000 mL    Glucerna 1.5: 900 mL    IV PiggyBack: 100 mL    IV PiggyBack: 100 mL    Norepinephrine: 31 mL  Total IN: 2271 mL    OUT:  Total OUT: 0 mL    Total NET: 2271 mL          Lab Data                        9.8    5.38  )-----------( 261      ( 20 Jun 2021 05:34 )             32.0     06-20    143  |  109<H>  |  x   ----------------------------<  x   4.5   |  x   |  x     Ca    8.6      19 Jun 2021 09:27  Phos  2.5     06-19  Mg     2.8     06-19    TPro  6.8  /  Alb  2.7<L>  /  TBili  0.5  /  DBili  x   /  AST  118<H>  /  ALT  102<H>  /  AlkPhos  102  06-19            Review of Systems	      Objective     Physical Examination    heart s1s2  lung dec BS  abd soft  head nc  trach  peg      Pertinent Lab findings & Imaging      Annalise:  NO   Adequate UO     I&O's Detail    18 Jun 2021 07:01  -  19 Jun 2021 07:00  --------------------------------------------------------  IN:    Enteral Tube Flush: 680 mL    Free Water: 1490 mL    Glucerna 1.5: 1100 mL    IV PiggyBack: 150 mL    IV PiggyBack: 250 mL    IV PiggyBack: 100 mL  Total IN: 3770 mL    OUT:    Voided (mL): 800 mL  Total OUT: 800 mL    Total NET: 2970 mL      19 Jun 2021 07:01  -  20 Jun 2021 06:03  --------------------------------------------------------  IN:    Enteral Tube Flush: 140 mL    Free Water: 1000 mL    Glucerna 1.5: 900 mL    IV PiggyBack: 100 mL    IV PiggyBack: 100 mL    Norepinephrine: 31 mL  Total IN: 2271 mL    OUT:  Total OUT: 0 mL    Total NET: 2271 mL               Discussed with:     Cultures:	        Radiology

## 2021-06-20 NOTE — PROGRESS NOTE ADULT - ASSESSMENT
Acute infection tx in progress  pt remains full code  updated   ID and CCM notes follow up reviewed  monitor HD - Pressors as needed to keep MAP > 60    76 yo female hx of aphasic stroke, covid 19, trach/vented, dm BIBEMS from [facility] for respiratory distress, found to have fever here 101.8  sepsis eval in progress  on ABX - cx in progress - on Caspo and Meropenem -   supportive ICU measures  vent support  not a candidate for weaning  spoke with   pt is full code  pain rx regimen - Fentanyl - Opioids -

## 2021-06-20 NOTE — PROGRESS NOTE ADULT - SUBJECTIVE AND OBJECTIVE BOX
[facility], Division of Infectious Diseases  MOIZ Lora Y. Patel, S. Shah  [phone]  ([phone] - weekdays after 5pm and weekends)    Name: BREA BECKHAM  Age/Gender: 75y Female  MRN: [id number]    Interval History:  Patient seen and examined at bedside this morning.  Remains on vent, awake but not following.   Notes reviewed. No concerning overnight events.    Allergies: codeine (Hives)    Objective:  Vitals:   T(F): 98.1 (06-20-21 @ 08:00), Max: 99.3 (06-20-21 @ 00:00)  HR: 74 (06-20-21 @ 09:00) (59 - 121)  BP: 133/64 (06-20-21 @ 09:00) (68/38 - 175/74)  RR: 25 (06-20-21 @ 09:00) (12 - 45)  SpO2: 98% (06-20-21 @ 09:00) (90% - 100%)  Physical Examination:  General: no acute distress  HEENT: NC/AT, anicteric, neck supple, trach to vent  Cardio: S1, S2 heard, RRR, no murmurs  Resp: clear to auscultation bilaterally  Abd: soft, no grimacing on palpation  Neuro: awake, not following   Ext: no edema or cyanosis  Skin: warm, dry, no visible rash    Laboratory Studies:  CBC:                       9.8    5.38  )-----------( 261      ( 20 Jun 2021 05:34 )             32.0     WBC trend:  WBC Count: 5.38 K/uL (06-20-21 @ 05:34)  WBC Count: 9.62 K/uL (06-19-21 @ 09:27)  WBC Count: 8.10 K/uL (06-18-21 @ 05:30)  WBC Count: 11.38 K/uL (06-17-21 @ 05:12)  WBC Count: 11.26 K/uL (06-16-21 @ 05:32)  WBC Count: 12.57 K/uL (06-15-21 @ 05:22)  WBC Count: 24.34 K/uL (06-14-21 @ 23:02)    CMP: 06-20    143  |  109<H>  |  25<H>  ----------------------------<  106<H>  4.5   |  27  |  0.87    Ca    8.4<L>      20 Jun 2021 05:34  Phos  2.1     06-20  Mg     2.6     06-20    TPro  6.1  /  Alb  2.5<L>  /  TBili  0.3  /  DBili  x   /  AST  94<H>  /  ALT  99<H>  /  AlkPhos  93  06-20    LIVER FUNCTIONS - ( 20 Jun 2021 05:34 )  Alb: 2.5 g/dL / Pro: 6.1 g/dL / ALK PHOS: 93 U/L / ALT: 99 U/L / AST: 94 U/L / GGT: x             Fungitell (06.18.21 @ 12:19): 44    Microbiology: reviewed   Culture - Blood (collected 06-18-21 @ 01:07)  Source: .Blood Blood-Venous  Preliminary Report (06-19-21 @ 02:01):    No growth to date.    Culture - Blood (collected 06-18-21 @ 01:07)  Source: .Blood Blood-Peripheral  Preliminary Report (06-19-21 @ 02:01):    No growth to date.    Culture - Urine (collected 06-15-21 @ 16:42)  Source: .Urine Catheterized  Final Report (06-16-21 @ 21:47):    50,000 - 99,000 CFU/mL Presumptive Candida albicans    "Susceptibilities not performed"    Culture - Sputum (collected 06-15-21 @ 16:28)  Source: .Sputum Sputum  Gram Stain (06-15-21 @ 18:57):    Few polymorphonuclear leukocytes per low power field    Few Squamous epithelial cells per low power field    Rare Gram Negative Rods per oil power field  Final Report (06-17-21 @ 17:45):    Normal Respiratory Elvira present    Culture - Blood (collected 06-15-21 @ 04:41)  Source: .Blood Blood-Peripheral  Final Report (06-20-21 @ 05:00):    No Growth Final    Culture - Blood (collected 06-15-21 @ 04:41)  Source: .Blood Blood-Peripheral  Final Report (06-20-21 @ 05:00):    No Growth Final    Radiology: reviewed     Medications:  MEDICATIONS  (STANDING):  ALBUTerol    90 MICROgram(s) HFA Inhaler 2 Puff(s) Inhalation every 6 hours  aspirin  chewable 81 milliGRAM(s) Oral daily  bisacodyl 5 milliGRAM(s) Oral at bedtime  caspofungin IVPB      caspofungin IVPB 50 milliGRAM(s) IV Intermittent every 24 hours  chlorhexidine 2% Cloths 1 Application(s) Topical <User Schedule>  dextrose 40% Gel 15 Gram(s) Oral once  dextrose 5%. 1000 milliLiter(s) (50 mL/Hr) IV Continuous <Continuous>  dextrose 5%. 1000 milliLiter(s) (100 mL/Hr) IV Continuous <Continuous>  dextrose 50% Injectable 25 Gram(s) IV Push once  dextrose 50% Injectable 12.5 Gram(s) IV Push once  dextrose 50% Injectable 25 Gram(s) IV Push once  enoxaparin Injectable 40 milliGRAM(s) SubCutaneous daily  fentaNYL   Patch  12 MICROgram(s)/Hr 1 Patch Transdermal every 72 hours  glucagon  Injectable 1 milliGRAM(s) IntraMuscular once  insulin glargine Injectable (LANTUS) 20 Unit(s) SubCutaneous at bedtime  insulin lispro (ADMELOG) corrective regimen sliding scale   SubCutaneous every 6 hours  ipratropium 17 MICROgram(s) HFA Inhaler 1 Puff(s) Inhalation every 6 hours  meropenem  IVPB 1000 milliGRAM(s) IV Intermittent every 8 hours  norepinephrine Infusion 0.05 MICROgram(s)/kG/Min (4.97 mL/Hr) IV Continuous <Continuous>  pantoprazole   Suspension 40 milliGRAM(s) Oral before breakfast  polyethylene glycol 3350 17 Gram(s) Oral daily  senna 2 Tablet(s) Oral at bedtime  sucralfate suspension 1 Gram(s) Oral every 6 hours    MEDICATIONS  (PRN):  acetaminophen    Suspension .. 650 milliGRAM(s) Oral every 6 hours PRN Temp greater or equal to 38C (100.4F)    Antimicrobials:  caspofungin IVPB      caspofungin IVPB 50 milliGRAM(s) IV Intermittent every 24 hours  meropenem  IVPB 1000 milliGRAM(s) IV Intermittent every 8 hours

## 2021-06-20 NOTE — PROGRESS NOTE ADULT - SUBJECTIVE AND OBJECTIVE BOX
PROGRESS NOTE  Patient is a 75y old  Female who presents with a chief complaint of fever and dyspnea (20 Jun 2021 06:03)  Chart and available morning labs /imaging are reviewed electronically , urgent issues addressed . More information  is being added upon completion of rounds , when more information is collected and management discussed with consultants , medical staff and social service/case management on the floor     OVERNIGHT      HPI:  74 yo Female ,[facility] resident with hx of  PMH Frontotemporal dementia, CVA, chronic vent dependent respiratory failure, dysphagia s/p PEG, recurrent UTI/VAP presents with septic shock from [facility] facility. She was last admitted at [facility] 10/ 2020 with proteus UTI ,sepsis ,hypernatremia and RYAN .Pseudomonas grew in sputum resistant to zosyn last time Admitted for septic workup and evaluation,send blood and urine cx,serial lactate levels,monitor vitals closley,ivfs hydration,monitor urine output and renal profile,iv abx initiated e. On arrival to ER found to be febrile to 102, WBC 20k, lactate 6.7. CXR with haziness of entire right side, urine appears cloudy. Med hx is significant for Advanced dementia ,s/p  Aphasic stroke , Constipation  ,COVID-19   ,CVA (cerebral vascular accident)  ,Dementia of frontal lobe type  ,Diabetes mellitus  ,DM (diabetes mellitus)type  2   ,GERD (gastroesophageal reflux disease)  ,HTN (hypertension)  ,Hypertension  ,Pneumonia  ,Quadriplegia  ,Respiratory failure ,s/p tracheostomy and peg Palliative care consult requested ,to discuss advance directives and complete /update  MOLST  (15 Zay 2021 07:06)    PAST MEDICAL & SURGICAL HISTORY:  Dementia of frontal lobe type    Aphasic stroke    Diabetes mellitus    Respiratory failure    Hypertension    GERD (gastroesophageal reflux disease)    Constipation    Respiratory failure    CVA (cerebral vascular accident)    HTN (hypertension)    DM (diabetes mellitus)    Advanced dementia    COVID-19 virus detected    Quadriplegia    Pneumonia    Type II diabetes mellitus    Hx of appendectomy    Gastrostomy in place    Tracheostomy in place    Tracheostomy tube present    Feeding by G-tube        MEDICATIONS  (STANDING):  ALBUTerol    90 MICROgram(s) HFA Inhaler 2 Puff(s) Inhalation every 6 hours  aspirin  chewable 81 milliGRAM(s) Oral daily  bisacodyl 5 milliGRAM(s) Oral at bedtime  caspofungin IVPB      caspofungin IVPB 50 milliGRAM(s) IV Intermittent every 24 hours  chlorhexidine 2% Cloths 1 Application(s) Topical <User Schedule>  dextrose 40% Gel 15 Gram(s) Oral once  dextrose 5%. 1000 milliLiter(s) (50 mL/Hr) IV Continuous <Continuous>  dextrose 5%. 1000 milliLiter(s) (100 mL/Hr) IV Continuous <Continuous>  dextrose 50% Injectable 25 Gram(s) IV Push once  dextrose 50% Injectable 12.5 Gram(s) IV Push once  dextrose 50% Injectable 25 Gram(s) IV Push once  enoxaparin Injectable 40 milliGRAM(s) SubCutaneous daily  fentaNYL   Patch  12 MICROgram(s)/Hr 1 Patch Transdermal every 72 hours  glucagon  Injectable 1 milliGRAM(s) IntraMuscular once  insulin glargine Injectable (LANTUS) 20 Unit(s) SubCutaneous at bedtime  insulin lispro (ADMELOG) corrective regimen sliding scale   SubCutaneous every 6 hours  ipratropium 17 MICROgram(s) HFA Inhaler 1 Puff(s) Inhalation every 6 hours  meropenem  IVPB 1000 milliGRAM(s) IV Intermittent every 8 hours  norepinephrine Infusion 0.05 MICROgram(s)/kG/Min (4.97 mL/Hr) IV Continuous <Continuous>  pantoprazole   Suspension 40 milliGRAM(s) Oral before breakfast  polyethylene glycol 3350 17 Gram(s) Oral daily  senna 2 Tablet(s) Oral at bedtime  sucralfate suspension 1 Gram(s) Oral every 6 hours    MEDICATIONS  (PRN):  acetaminophen    Suspension .. 650 milliGRAM(s) Oral every 6 hours PRN Temp greater or equal to 38C (100.4F)      OBJECTIVE    T(C): 37.4 (06-20-21 @ 00:00), Max: 37.4 (06-20-21 @ 00:00)  HR: 68 (06-20-21 @ 08:16) (59 - 121)  BP: 134/62 (06-20-21 @ 08:00) (68/38 - 175/74)  RR: 20 (06-20-21 @ 08:00) (12 - 45)  SpO2: 98% (06-20-21 @ 08:16) (90% - 100%)  Wt(kg): --  I&O's Summary    19 Jun 2021 07:01  -  20 Jun 2021 07:00  --------------------------------------------------------  IN: 2721 mL / OUT: 0 mL / NET: 2721 mL          REVIEW OF SYSTEMS:  Patient is  unable to provide any information/ROS  due to baseline mental status.   PHYSICAL EXAM:  Appearance: NAD. VS past 24 hrs -as above   HEENT:   Moist oral mucosa. Conjunctiva clear b/l.   Neck : supple  Respiratory: Lungs CTAB.  Gastrointestinal:  Soft, nontender. No rebound. No rigidity. BS present	  Cardiovascular: RRR ,S1S2 present  Neurologic: Non-focal. Moving all extremities.  Extremities: No edema. No erythema. No calf tenderness.  Skin: No rashes, No ecchymoses, No cyanosis.	  wounds ,skin lesions-See skin assesment flow sheet   LABS:                        9.8    5.38  )-----------( 261      ( 20 Jun 2021 05:34 )             32.0     06-20    143  |  109<H>  |  25<H>  ----------------------------<  106<H>  4.5   |  27  |  0.87    Ca    8.4<L>      20 Jun 2021 05:34  Phos  2.1     06-20  Mg     2.6     06-20    TPro  6.1  /  Alb  2.5<L>  /  TBili  0.3  /  DBili  x   /  AST  94<H>  /  ALT  99<H>  /  AlkPhos  93  06-20    CAPILLARY BLOOD GLUCOSE      POCT Blood Glucose.: 109 mg/dL (20 Jun 2021 05:04)  POCT Blood Glucose.: 172 mg/dL (19 Jun 2021 23:11)  POCT Blood Glucose.: 148 mg/dL (19 Jun 2021 17:40)  POCT Blood Glucose.: 141 mg/dL (19 Jun 2021 12:03)          Culture - Blood (collected 18 Jun 2021 01:07)  Source: .Blood Blood-Venous  Preliminary Report (19 Jun 2021 02:01):    No growth to date.    Culture - Blood (collected 18 Jun 2021 01:07)  Source: .Blood Blood-Peripheral  Preliminary Report (19 Jun 2021 02:01):    No growth to date.    Culture - Urine (collected 15 Zay 2021 16:42)  Source: .Urine Catheterized  Final Report (16 Jun 2021 21:47):    50,000 - 99,000 CFU/mL Presumptive Candida albicans    "Susceptibilities not performed"    Culture - Sputum (collected 15 Zay 2021 16:28)  Source: .Sputum Sputum  Gram Stain (15 Zay 2021 18:57):    Few polymorphonuclear leukocytes per low power field    Few Squamous epithelial cells per low power field    Rare Gram Negative Rods per oil power field  Final Report (17 Jun 2021 17:45):    Normal Respiratory Elvira present    Culture - Blood (collected 15 Zay 2021 04:41)  Source: .Blood Blood-Peripheral  Final Report (20 Jun 2021 05:00):    No Growth Final    Culture - Blood (collected 15 Zay 2021 04:41)  Source: .Blood Blood-Peripheral  Final Report (20 Jun 2021 05:00):    No Growth Final      RADIOLOGY & ADDITIONAL TESTS:   reviewed elctronically  ASSESSMENT/PLAN: 	     PROGRESS NOTE  Patient is a 75y old  Female who presents with a chief complaint of fever and dyspnea (20 Jun 2021 06:03)  Chart and available morning labs /imaging are reviewed electronically , urgent issues addressed . More information  is being added upon completion of rounds , when more information is collected and management discussed with consultants , medical staff and social service/case management on the floor     OVERNIGHT Remains on vent, awake but not following commands .   Notes reviewed. No concerning overnight events.  No new issues reported by medical staff . All above noted Patient is resting in a bed comfortably . .No distress noted     HPI:  76 yo Female ,[facility] resident with hx of  PMH Frontotemporal dementia, CVA, chronic vent dependent respiratory failure, dysphagia s/p PEG, recurrent UTI/VAP presents with septic shock from [facility] facility. She was last admitted at [facility] 10/ 2020 with proteus UTI ,sepsis ,hypernatremia and RYAN .Pseudomonas grew in sputum resistant to zosyn last time Admitted for septic workup and evaluation,send blood and urine cx,serial lactate levels,monitor vitals closley,ivfs hydration,monitor urine output and renal profile,iv abx initiated e. On arrival to ER found to be febrile to 102, WBC 20k, lactate 6.7. CXR with haziness of entire right side, urine appears cloudy. Med hx is significant for Advanced dementia ,s/p  Aphasic stroke , Constipation  ,COVID-19   ,CVA (cerebral vascular accident)  ,Dementia of frontal lobe type  ,Diabetes mellitus  ,DM (diabetes mellitus)type  2   ,GERD (gastroesophageal reflux disease)  ,HTN (hypertension)  ,Hypertension  ,Pneumonia  ,Quadriplegia  ,Respiratory failure ,s/p tracheostomy and peg Palliative care consult requested ,to discuss advance directives and complete /update  MOLST  (15 Zay 2021 07:06)    PAST MEDICAL & SURGICAL HISTORY:  Dementia of frontal lobe type    Aphasic stroke    Diabetes mellitus    Respiratory failure    Hypertension    GERD (gastroesophageal reflux disease)    Constipation    Respiratory failure    CVA (cerebral vascular accident)    HTN (hypertension)    DM (diabetes mellitus)    Advanced dementia    COVID-19 virus detected    Quadriplegia    Pneumonia    Type II diabetes mellitus    Hx of appendectomy    Gastrostomy in place    Tracheostomy in place    Tracheostomy tube present    Feeding by G-tube        MEDICATIONS  (STANDING):  ALBUTerol    90 MICROgram(s) HFA Inhaler 2 Puff(s) Inhalation every 6 hours  aspirin  chewable 81 milliGRAM(s) Oral daily  bisacodyl 5 milliGRAM(s) Oral at bedtime  caspofungin IVPB      caspofungin IVPB 50 milliGRAM(s) IV Intermittent every 24 hours  chlorhexidine 2% Cloths 1 Application(s) Topical <User Schedule>  dextrose 40% Gel 15 Gram(s) Oral once  dextrose 5%. 1000 milliLiter(s) (50 mL/Hr) IV Continuous <Continuous>  dextrose 5%. 1000 milliLiter(s) (100 mL/Hr) IV Continuous <Continuous>  dextrose 50% Injectable 25 Gram(s) IV Push once  dextrose 50% Injectable 12.5 Gram(s) IV Push once  dextrose 50% Injectable 25 Gram(s) IV Push once  enoxaparin Injectable 40 milliGRAM(s) SubCutaneous daily  fentaNYL   Patch  12 MICROgram(s)/Hr 1 Patch Transdermal every 72 hours  glucagon  Injectable 1 milliGRAM(s) IntraMuscular once  insulin glargine Injectable (LANTUS) 20 Unit(s) SubCutaneous at bedtime  insulin lispro (ADMELOG) corrective regimen sliding scale   SubCutaneous every 6 hours  ipratropium 17 MICROgram(s) HFA Inhaler 1 Puff(s) Inhalation every 6 hours  meropenem  IVPB 1000 milliGRAM(s) IV Intermittent every 8 hours  norepinephrine Infusion 0.05 MICROgram(s)/kG/Min (4.97 mL/Hr) IV Continuous <Continuous>  pantoprazole   Suspension 40 milliGRAM(s) Oral before breakfast  polyethylene glycol 3350 17 Gram(s) Oral daily  senna 2 Tablet(s) Oral at bedtime  sucralfate suspension 1 Gram(s) Oral every 6 hours    MEDICATIONS  (PRN):  acetaminophen    Suspension .. 650 milliGRAM(s) Oral every 6 hours PRN Temp greater or equal to 38C (100.4F)      OBJECTIVE    T(C): 37.4 (06-20-21 @ 00:00), Max: 37.4 (06-20-21 @ 00:00)  HR: 68 (06-20-21 @ 08:16) (59 - 121)  BP: 134/62 (06-20-21 @ 08:00) (68/38 - 175/74)  RR: 20 (06-20-21 @ 08:00) (12 - 45)  SpO2: 98% (06-20-21 @ 08:16) (90% - 100%)  Wt(kg): --  I&O's Summary    19 Jun 2021 07:01  -  20 Jun 2021 07:00  --------------------------------------------------------  IN: 2721 mL / OUT: 0 mL / NET: 2721 mL          REVIEW OF SYSTEMS:  Patient is  unable to provide any information/ROS  due to baseline mental status.   PHYSICAL EXAM: trach /vent   Appearance: NAD. VS past 24 hrs -as above   HEENT:   Moist oral mucosa. Conjunctiva clear b/l.   Neck : supple  Respiratory: Lungs CTAB.  Gastrointestinal:  Soft, nontender. No rebound. No rigidity. BS present	 peg in place   Cardiovascular: RRR ,S1S2 present  Neurologic: Non-focal. Moving all extremities.  Extremities: No edema. No erythema. No calf tenderness.  Skin: No rashes, No ecchymoses, No cyanosis.	  wounds ,skin lesions-See skin assesment flow sheet   LABS:                        9.8    5.38  )-----------( 261      ( 20 Jun 2021 05:34 )             32.0     06-20    143  |  109<H>  |  25<H>  ----------------------------<  106<H>  4.5   |  27  |  0.87    Ca    8.4<L>      20 Jun 2021 05:34  Phos  2.1     06-20  Mg     2.6     06-20    TPro  6.1  /  Alb  2.5<L>  /  TBili  0.3  /  DBili  x   /  AST  94<H>  /  ALT  99<H>  /  AlkPhos  93  06-20    CAPILLARY BLOOD GLUCOSE      POCT Blood Glucose.: 109 mg/dL (20 Jun 2021 05:04)  POCT Blood Glucose.: 172 mg/dL (19 Jun 2021 23:11)  POCT Blood Glucose.: 148 mg/dL (19 Jun 2021 17:40)  POCT Blood Glucose.: 141 mg/dL (19 Jun 2021 12:03)          Culture - Blood (collected 18 Jun 2021 01:07)  Source: .Blood Blood-Venous  Preliminary Report (19 Jun 2021 02:01):    No growth to date.    Culture - Blood (collected 18 Jun 2021 01:07)  Source: .Blood Blood-Peripheral  Preliminary Report (19 Jun 2021 02:01):    No growth to date.    Culture - Urine (collected 15 Zay 2021 16:42)  Source: .Urine Catheterized  Final Report (16 Jun 2021 21:47):    50,000 - 99,000 CFU/mL Presumptive Candida albicans    "Susceptibilities not performed"    Culture - Sputum (collected 15 Zay 2021 16:28)  Source: .Sputum Sputum  Gram Stain (15 Zay 2021 18:57):    Few polymorphonuclear leukocytes per low power field    Few Squamous epithelial cells per low power field    Rare Gram Negative Rods per oil power field  Final Report (17 Jun 2021 17:45):    Normal Respiratory Elvira present    Culture - Blood (collected 15 Zay 2021 04:41)  Source: .Blood Blood-Peripheral  Final Report (20 Jun 2021 05:00):    No Growth Final    Culture - Blood (collected 15 Zay 2021 04:41)  Source: .Blood Blood-Peripheral  Final Report (20 Jun 2021 05:00):    No Growth Final      RADIOLOGY & ADDITIONAL TESTS:   reviewed elctronically  ASSESSMENT/PLAN: 	    25minutes spent on this visit, 50% visit time spent in care co-ordination with other attendings and counselling patient

## 2021-06-20 NOTE — PROGRESS NOTE ADULT - SUBJECTIVE AND OBJECTIVE BOX
BREA BECKHAM    [facility] ICU1 12A    Patient is a 75y old  Female who presents with a chief complaint of fever and dyspnea (20 Jun 2021 10:30)       Allergies    codeine (Hives)    Intolerances        HPI:  74 yo Female ,[facility] resident with hx of  PMH Frontotemporal dementia, CVA, chronic vent dependent respiratory failure, dysphagia s/p PEG, recurrent UTI/VAP presents with septic shock from [facility] facility. She was last admitted at [facility] 10/ 2020 with proteus UTI ,sepsis ,hypernatremia and RYAN .Pseudomonas grew in sputum resistant to zosyn last time Admitted for septic workup and evaluation,send blood and urine cx,serial lactate levels,monitor vitals closley,ivfs hydration,monitor urine output and renal profile,iv abx initiated e. On arrival to ER found to be febrile to 102, WBC 20k, lactate 6.7. CXR with haziness of entire right side, urine appears cloudy. Med hx is significant for Advanced dementia ,s/p  Aphasic stroke , Constipation  ,COVID-19   ,CVA (cerebral vascular accident)  ,Dementia of frontal lobe type  ,Diabetes mellitus  ,DM (diabetes mellitus)type  2   ,GERD (gastroesophageal reflux disease)  ,HTN (hypertension)  ,Hypertension  ,Pneumonia  ,Quadriplegia  ,Respiratory failure ,s/p tracheostomy and peg Palliative care consult requested ,to discuss advance directives and complete /update  MOLST  (15 Zay 2021 07:06)      PAST MEDICAL & SURGICAL HISTORY:  Dementia of frontal lobe type    Aphasic stroke    Diabetes mellitus    Respiratory failure    Hypertension    GERD (gastroesophageal reflux disease)    Constipation    Respiratory failure    CVA (cerebral vascular accident)    HTN (hypertension)    DM (diabetes mellitus)    Advanced dementia    COVID-19 virus detected    Quadriplegia    Pneumonia    Type II diabetes mellitus    Hx of appendectomy    Gastrostomy in place    Tracheostomy in place    Tracheostomy tube present    Feeding by G-tube        FAMILY HISTORY:  No pertinent family history in first degree relatives          MEDICATIONS   acetaminophen    Suspension .. 650 milliGRAM(s) Oral every 6 hours PRN  ALBUTerol    90 MICROgram(s) HFA Inhaler 2 Puff(s) Inhalation every 6 hours  aspirin  chewable 81 milliGRAM(s) Oral daily  bisacodyl 5 milliGRAM(s) Oral at bedtime  caspofungin IVPB      caspofungin IVPB 50 milliGRAM(s) IV Intermittent every 24 hours  chlorhexidine 2% Cloths 1 Application(s) Topical <User Schedule>  dextrose 40% Gel 15 Gram(s) Oral once  dextrose 5%. 1000 milliLiter(s) IV Continuous <Continuous>  dextrose 5%. 1000 milliLiter(s) IV Continuous <Continuous>  dextrose 50% Injectable 25 Gram(s) IV Push once  dextrose 50% Injectable 12.5 Gram(s) IV Push once  dextrose 50% Injectable 25 Gram(s) IV Push once  enoxaparin Injectable 40 milliGRAM(s) SubCutaneous daily  fentaNYL   Patch  12 MICROgram(s)/Hr 1 Patch Transdermal every 72 hours  glucagon  Injectable 1 milliGRAM(s) IntraMuscular once  insulin glargine Injectable (LANTUS) 20 Unit(s) SubCutaneous at bedtime  insulin lispro (ADMELOG) corrective regimen sliding scale   SubCutaneous every 6 hours  ipratropium 17 MICROgram(s) HFA Inhaler 1 Puff(s) Inhalation every 6 hours  meropenem  IVPB 1000 milliGRAM(s) IV Intermittent every 8 hours  midodrine. 10 milliGRAM(s) Oral three times a day  norepinephrine Infusion 0.05 MICROgram(s)/kG/Min IV Continuous <Continuous>  pantoprazole   Suspension 40 milliGRAM(s) Oral before breakfast  polyethylene glycol 3350 17 Gram(s) Oral daily  senna 2 Tablet(s) Oral at bedtime  sucralfate suspension 1 Gram(s) Oral every 6 hours      Vital Signs Last 24 Hrs  T(C): 36.7 (20 Jun 2021 08:00), Max: 37.4 (20 Jun 2021 00:00)  T(F): 98.1 (20 Jun 2021 08:00), Max: 99.3 (20 Jun 2021 00:00)  HR: 72 (20 Jun 2021 10:00) (59 - 121)  BP: 127/63 (20 Jun 2021 10:00) (68/38 - 175/74)  BP(mean): 90 (20 Jun 2021 10:00) (48 - 112)  RR: 22 (20 Jun 2021 10:00) (12 - 45)  SpO2: 99% (20 Jun 2021 10:00) (90% - 100%)      06-19-21 @ 07:01  -  06-20-21 @ 07:00  --------------------------------------------------------  IN: 2721 mL / OUT: 0 mL / NET: 2721 mL    06-20-21 @ 07:01  -  06-20-21 @ 11:38  --------------------------------------------------------  IN: 400 mL / OUT: 0 mL / NET: 400 mL        Mode: AC/ CMV (Assist Control/ Continuous Mandatory Ventilation), RR (machine): 14, TV (machine): 400, FiO2: 25, PEEP: 5, ITime: 1, MAP: 11, PIP: 25    LABS:                        9.8    5.38  )-----------( 261      ( 20 Jun 2021 05:34 )             32.0     06-20    143  |  109<H>  |  25<H>  ----------------------------<  106<H>  4.5   |  27  |  0.87    Ca    8.4<L>      20 Jun 2021 05:34  Phos  2.1     06-20  Mg     2.6     06-20    TPro  6.1  /  Alb  2.5<L>  /  TBili  0.3  /  DBili  x   /  AST  94<H>  /  ALT  99<H>  /  AlkPhos  93  06-20              WBC:  WBC Count: 5.38 K/uL (06-20 @ 05:34)  WBC Count: 9.62 K/uL (06-19 @ 09:27)  WBC Count: 8.10 K/uL (06-18 @ 05:30)  WBC Count: 11.38 K/uL (06-17 @ 05:12)      MICROBIOLOGY:  RECENT CULTURES:  06-18 .Blood Blood-Peripheral XXXX XXXX   No growth to date.    06-15 .Urine Catheterized XXXX XXXX   50,000 - 99,000 CFU/mL Presumptive Candida albicans  "Susceptibilities not performed"    06-15 .Sputum Sputum XXXX   Few polymorphonuclear leukocytes per low power field  Few Squamous epithelial cells per low power field  Rare Gram Negative Rods per oil power field   Normal Respiratory Elvira present    06-15 .Blood Blood-Peripheral XXXX XXXX   No Growth Final                    Sodium:  Sodium, Serum: 143 mmol/L (06-20 @ 05:34)  Sodium, Serum: 143 mmol/L (06-19 @ 09:27)  Sodium, Serum: 143 mmol/L (06-18 @ 05:30)  Sodium, Serum: 148 mmol/L (06-17 @ 05:12)      0.87 mg/dL 06-20 @ 05:34  1.10 mg/dL 06-19 @ 09:27  0.83 mg/dL 06-18 @ 05:30  1.00 mg/dL 06-17 @ 05:12      Hemoglobin:  Hemoglobin: 9.8 g/dL (06-20 @ 05:34)  Hemoglobin: 10.7 g/dL (06-19 @ 09:27)  Hemoglobin: 10.5 g/dL (06-18 @ 05:30)  Hemoglobin: 10.8 g/dL (06-17 @ 05:12)      Platelets: Platelet Count - Automated: 261 K/uL (06-20 @ 05:34)  Platelet Count - Automated: 261 K/uL (06-19 @ 09:27)  Platelet Count - Automated: 228 K/uL (06-18 @ 05:30)  Platelet Count - Automated: 267 K/uL (06-17 @ 05:12)      LIVER FUNCTIONS - ( 20 Jun 2021 05:34 )  Alb: 2.5 g/dL / Pro: 6.1 g/dL / ALK PHOS: 93 U/L / ALT: 99 U/L / AST: 94 U/L / GGT: x                 RADIOLOGY & ADDITIONAL STUDIES:

## 2021-06-20 NOTE — PROGRESS NOTE ADULT - SUBJECTIVE AND OBJECTIVE BOX
INCOMPLETE    Patient is a 75y old  Female who presents with a chief complaint of fever and dyspnea (20 Jun 2021 09:07)    24 hour events: ***    REVIEW OF SYSTEMS  Constitutional: No fever, chills, fatigue  Neuro: No headache, numbness, weakness  Resp: No cough, wheezing, shortness of breath  CVS: No chest pain, palpitations, leg swelling  GI: No abdominal pain, nausea, vomiting, diarrhea   : No dysuria, frequency, incontinence  Skin: No itching, burning, rashes, or lesions   Msk: No joint pain or swelling  Psych: No depression, anxiety, mood swings  Heme: No bleeding    T(F): 98.1 (06-20-21 @ 08:00), Max: 99.3 (06-20-21 @ 00:00)  HR: 74 (06-20-21 @ 09:00) (59 - 121)  BP: 133/64 (06-20-21 @ 09:00) (68/38 - 175/74)  RR: 25 (06-20-21 @ 09:00) (12 - 45)  SpO2: 98% (06-20-21 @ 09:00) (90% - 100%)  Wt(kg): --    Mode: AC/ CMV (Assist Control/ Continuous Mandatory Ventilation), RR (machine): 14, TV (machine): 400, FiO2: 25, PEEP: 5        I&O's Summary    06-19 @ 07:01  -  06-20 @ 07:00  --------------------------------------------------------  IN: 2721 mL / OUT: 0 mL / NET: 2721 mL      PHYSICAL EXAM  General:   CNS:   HEENT:   Resp:   CVS:   Abd:   Ext:   Skin:     MEDICATIONS  caspofungin IVPB   caspofungin IVPB IV Intermittent  meropenem  IVPB IV Intermittent    norepinephrine Infusion IV Continuous    dextrose 40% Gel Oral  dextrose 50% Injectable IV Push  dextrose 50% Injectable IV Push  dextrose 50% Injectable IV Push  glucagon  Injectable IntraMuscular  insulin glargine Injectable (LANTUS) SubCutaneous  insulin lispro (ADMELOG) corrective regimen sliding scale SubCutaneous    ALBUTerol    90 MICROgram(s) HFA Inhaler Inhalation  ipratropium 17 MICROgram(s) HFA Inhaler Inhalation    acetaminophen    Suspension .. Oral PRN  fentaNYL   Patch  12 MICROgram(s)/Hr Transdermal      aspirin  chewable Oral  enoxaparin Injectable SubCutaneous    bisacodyl Oral  pantoprazole   Suspension Oral  polyethylene glycol 3350 Oral  senna Oral  sucralfate suspension Oral      dextrose 5%. IV Continuous  dextrose 5%. IV Continuous      chlorhexidine 2% Cloths Topical                            9.8    5.38  )-----------( 261      ( 20 Jun 2021 05:34 )             32.0       06-20    143  |  109<H>  |  25<H>  ----------------------------<  106<H>  4.5   |  27  |  0.87    Ca    8.4<L>      20 Jun 2021 05:34  Phos  2.1     06-20  Mg     2.6     06-20    TPro  6.1  /  Alb  2.5<L>  /  TBili  0.3  /  DBili  x   /  AST  94<H>  /  ALT  99<H>  /  AlkPhos  93  06-20              .Blood Blood-Peripheral   No growth to date. -- 06-18 @ 01:07  .Urine Catheterized   50,000 - 99,000 CFU/mL Presumptive Candida albicans  "Susceptibilities not performed" -- 06-15 @ 16:42  .Sputum Sputum   Normal Respiratory Elvira present   Few polymorphonuclear leukocytes per low power field  Few Squamous epithelial cells per low power field  Rare Gram Negative Rods per oil power field 06-15 @ 16:28        Radiology: ***  Bedside lung ultrasound: ***  Bedside ECHO: ***    CENTRAL LINE: Y/N          DATE INSERTED:              REMOVE: Y/N  REID: Y/N                        DATE INSERTED:              REMOVE: Y/N  A-LINE: Y/N                       DATE INSERTED:              REMOVE: Y/N    GLOBAL ISSUE/BEST PRACTICE  Analgesia:   Sedation:   CAM-ICU:   HOB elevation: yes  Stress ulcer prophylaxis:   VTE prophylaxis:   Glycemic control:   Nutrition:     CODE STATUS: ***  [facility] discussion: Y       Patient is a 75y old  Female who presents with a chief complaint of fever and dyspnea (20 Jun 2021 09:07)    24 hour events: Patient afebrile overnight. Required ativan x1 for tachypnea and worsening rigidity with subsequent hypotension requiring levophed. BP improved and levo turned off at 5am. Patient with another episode of tachypnea with associated rigidity and elevated BP in the AM requiring ativan x1.     REVIEW OF SYSTEMS  Unable to obtain 2/2 mental status    T(F): 98.1 (06-20-21 @ 08:00), Max: 99.3 (06-20-21 @ 00:00)  HR: 74 (06-20-21 @ 09:00) (59 - 121)  BP: 133/64 (06-20-21 @ 09:00) (68/38 - 175/74)  RR: 25 (06-20-21 @ 09:00) (12 - 45)  SpO2: 98% (06-20-21 @ 09:00) (90% - 100%)  Wt(kg): --    Mode: AC/ CMV (Assist Control/ Continuous Mandatory Ventilation), RR (machine): 14, TV (machine): 400, FiO2: 25, PEEP: 5      I&O's Summary    06-19 @ 07:01  -  06-20 @ 07:00  --------------------------------------------------------  IN: 2721 mL / OUT: 0 mL / NET: 2721 mL      PHYSICAL EXAM  General: Elderly, chronic trach to vent, ill-appearing  HEENT: Pupils equal, reactive to light. Symmetric  PULM: Clear to auscultation bilaterally, no significant sputum production. No wheezes or rales.  CVS: Regular rate and rhythm, no murmurs, rubs, or gallops  ABD: Mild distention. Soft,, normoactive bowel sounds, no guarding. +PEG in place, c/d/i  EXT: No edema, nontender  SKIN: Warm and dry   NEURO: opens eyes to voice, awake but does not follow command, contracted UE > LE    MEDICATIONS  caspofungin IVPB   caspofungin IVPB IV Intermittent  meropenem  IVPB IV Intermittent  norepinephrine Infusion IV Continuous  dextrose 40% Gel Oral  dextrose 50% Injectable IV Push  dextrose 50% Injectable IV Push  dextrose 50% Injectable IV Push  glucagon  Injectable IntraMuscular  insulin glargine Injectable (LANTUS) SubCutaneous  insulin lispro (ADMELOG) corrective regimen sliding scale SubCutaneous  ALBUTerol    90 MICROgram(s) HFA Inhaler Inhalation  ipratropium 17 MICROgram(s) HFA Inhaler Inhalation  acetaminophen    Suspension .. Oral PRN  fentaNYL   Patch  12 MICROgram(s)/Hr Transdermal  aspirin  chewable Oral  enoxaparin Injectable SubCutaneous  bisacodyl Oral  pantoprazole   Suspension Oral  polyethylene glycol 3350 Oral  senna Oral  sucralfate suspension Oral  dextrose 5%. IV Continuous  dextrose 5%. IV Continuous  chlorhexidine 2% Cloths Topical                          9.8    5.38  )-----------( 261      ( 20 Jun 2021 05:34 )             32.0       06-20    143  |  109<H>  |  25<H>  ----------------------------<  106<H>  4.5   |  27  |  0.87    Ca    8.4<L>      20 Jun 2021 05:34  Phos  2.1     06-20  Mg     2.6     06-20    TPro  6.1  /  Alb  2.5<L>  /  TBili  0.3  /  DBili  x   /  AST  94<H>  /  ALT  99<H>  /  AlkPhos  93  06-20      .Blood Blood-Peripheral   No growth to date. -- 06-18 @ 01:07  .Urine Catheterized   50,000 - 99,000 CFU/mL Presumptive Candida albicans  "Susceptibilities not performed" -- 06-15 @ 16:42  .Sputum Sputum   Normal Respiratory Elvira present   Few polymorphonuclear leukocytes per low power field  Few Squamous epithelial cells per low power field  Rare Gram Negative Rods per oil power field 06-15 @ 16:28      CENTRAL LINE: N           REID: N                 A-LINE: N                          GLOBAL ISSUE/BEST PRACTICE  Analgesia: N  Sedation: N  CAM-ICU: Y  HOB elevation: yes  Stress ulcer prophylaxis: Y  VTE prophylaxis: Y  Glycemic control: Y  Nutrition: Y    CODE STATUS: FULL

## 2021-06-20 NOTE — PROGRESS NOTE ADULT - SUBJECTIVE AND OBJECTIVE BOX
Patient is a 75y Female whom presented to the hospital with ckd and manasa     PAST MEDICAL & SURGICAL HISTORY:  Dementia of frontal lobe type    Aphasic stroke    Diabetes mellitus    Respiratory failure    Hypertension    GERD (gastroesophageal reflux disease)    Constipation    Respiratory failure    CVA (cerebral vascular accident)    HTN (hypertension)    DM (diabetes mellitus)    Advanced dementia    COVID-19 virus detected    Quadriplegia    Pneumonia    Type II diabetes mellitus    Hx of appendectomy    Gastrostomy in place    Tracheostomy in place    Tracheostomy tube present    Feeding by G-tube        MEDICATIONS  (STANDING):  ALBUTerol    90 MICROgram(s) HFA Inhaler 2 Puff(s) Inhalation every 6 hours  aspirin  chewable 81 milliGRAM(s) Oral daily  chlorhexidine 2% Cloths 1 Application(s) Topical <User Schedule>  dextrose 40% Gel 15 Gram(s) Oral once  dextrose 5%. 1000 milliLiter(s) (50 mL/Hr) IV Continuous <Continuous>  dextrose 5%. 1000 milliLiter(s) (100 mL/Hr) IV Continuous <Continuous>  dextrose 50% Injectable 25 Gram(s) IV Push once  dextrose 50% Injectable 12.5 Gram(s) IV Push once  dextrose 50% Injectable 25 Gram(s) IV Push once  glucagon  Injectable 1 milliGRAM(s) IntraMuscular once  heparin   Injectable 5000 Unit(s) SubCutaneous every 8 hours  insulin glargine Injectable (LANTUS) 20 Unit(s) SubCutaneous at bedtime  insulin lispro (ADMELOG) corrective regimen sliding scale   SubCutaneous every 6 hours  ipratropium 17 MICROgram(s) HFA Inhaler 1 Puff(s) Inhalation every 6 hours  meropenem  IVPB      meropenem  IVPB 1000 milliGRAM(s) IV Intermittent every 12 hours  pantoprazole   Suspension 40 milliGRAM(s) Oral before breakfast  polyethylene glycol 3350 17 Gram(s) Oral daily  senna 2 Tablet(s) Oral at bedtime      Allergies    codeine (Hives)    Intolerances        SOCIAL HISTORY:  Denies ETOh,Smoking,     FAMILY HISTORY:  No pertinent family history in first degree relatives        REVIEW OF SYSTEMS:    unable to obtained a good review system                                                                        9.8    5.38  )-----------( 261      ( 20 Jun 2021 05:34 )             32.0       CBC Full  -  ( 20 Jun 2021 05:34 )  WBC Count : 5.38 K/uL  RBC Count : 3.55 M/uL  Hemoglobin : 9.8 g/dL  Hematocrit : 32.0 %  Platelet Count - Automated : 261 K/uL  Mean Cell Volume : 90.1 fl  Mean Cell Hemoglobin : 27.6 pg  Mean Cell Hemoglobin Concentration : 30.6 gm/dL  Auto Neutrophil # : 2.68 K/uL  Auto Lymphocyte # : 1.89 K/uL  Auto Monocyte # : 0.64 K/uL  Auto Eosinophil # : 0.19 K/uL  Auto Basophil # : 0.02 K/uL  Auto Neutrophil % : 49.1 %  Auto Lymphocyte % : 34.6 %  Auto Monocyte % : 11.7 %  Auto Eosinophil % : 3.5 %  Auto Basophil % : 0.4 %      06-20    143  |  109<H>  |  25<H>  ----------------------------<  106<H>  4.5   |  27  |  0.87    Ca    8.4<L>      20 Jun 2021 05:34  Phos  2.1     06-20  Mg     2.6     06-20    TPro  6.1  /  Alb  2.5<L>  /  TBili  0.3  /  DBili  x   /  AST  94<H>  /  ALT  99<H>  /  AlkPhos  93  06-20      CAPILLARY BLOOD GLUCOSE      POCT Blood Glucose.: 115 mg/dL (20 Jun 2021 17:23)  POCT Blood Glucose.: 161 mg/dL (20 Jun 2021 11:45)  POCT Blood Glucose.: 109 mg/dL (20 Jun 2021 05:04)  POCT Blood Glucose.: 172 mg/dL (19 Jun 2021 23:11)      Vital Signs Last 24 Hrs  T(C): 36.6 (20 Jun 2021 16:00), Max: 37.4 (20 Jun 2021 00:00)  T(F): 97.8 (20 Jun 2021 16:00), Max: 99.3 (20 Jun 2021 00:00)  HR: 58 (20 Jun 2021 18:30) (56 - 134)  BP: 135/60 (20 Jun 2021 18:30) (68/38 - 178/104)  BP(mean): 86 (20 Jun 2021 18:30) (48 - 136)  RR: 16 (20 Jun 2021 18:30) (11 - 51)  SpO2: 100% (20 Jun 2021 18:30) (94% - 100%)                              PHYSICAL EXAM:    Constitutional: NAD  Neck:  No JVD  Respiratory: decrease bs b/l , pos trach   Cardiovascular: S1 and S2  Gastrointestinal: BS+, soft, pos peg   Extremities: No peripheral edema

## 2021-06-20 NOTE — PROGRESS NOTE ADULT - ASSESSMENT
PARASHARAMI BREA 75 f [facility] P 6/15/2021   DR ILIANA KEMP      REVIEW OF SYMPTOMS      Able to give (reliable) ROS  NO     PHYSICAL EXAM    HEENT Unremarkable  atraumatic   RESP Fair air entry EXP prolonged    Harsh breath sound Resp distres mild   CARDIAC S1 S2 No S3     NO JVD    ABDOMEN SOFT BS PRESENT NOT DISTENDED No hepatosplenomegaly   PEDAL EDEMA present No calf tenderness  NO rash       PARASHARAMI BREA 75 f [facility] P 6/15/2021 ADMISSION PROBLEMS    COVID STATUS   scv2 6/15/2021 (-)   spkab 6/16 (+)     SEPTIC SHOCK poa  resolvd 6/16/2021   NOREPI NEEDED 6/19/2021 BRIEFLY     VAP poa  SIGMOID COLITIS 6/15/2021 CTAP    CANDIDUIA 6/17/2021   CASPOFUNGIN 6/17/2021     COPD   ANEMIA   HYPERNATREMIA r  FREE WATER 6/17/2021   RYAN resolvd 6/16/2021     PMH VENT DEPENDENT   PMH TRACH  PMH PEG   PMH UTI VAP   PMH ZOSYN RESISTANT PSEUDOMONAS   PMH DEMENTIA   PMH CVA .    GLOBAL AND BEST PRACTICE ISSUES                     ALLERGY.    CODEINE   WEIGHT.     6/15/2021 53                         BMI               6/15/2021 20.                                         .                          ADVANCED DIRECTIVE.                               HEAD OF BED ELEVATION. Yes  DVT PROPHYLAXIS.   HPSC (6/15/2021)   -> lvnx 40 96/18/2021)                  SQUIRES PROPHYLAXIS.       PROTONIX 40 (6/15/2021)   APA.      ASA 81 (6/15/2021)                                                               DYSPHAGIA RECOMM.   DIET.    GLUCERNA 1.5 1000 (6/15/2021)   INFECTION PROPHYLAXIS.   CHLORHEXIDINE 2% (6/15/2021)   FREE WATER.    250.6 (6/17/2021)     PATIENT DATA                ABG.     6/15/2021 12 40% /400/5/14 742/34/166              OXYGENATION.                   VITALS/IO/VENT/DRIPS.   6/20/2021 afeb 72 120/80   6/20/2021 a 14/400/5/.25     ASSESSMENT/RECOMMENDATIONS.    SEPTIC SHOCK poa   target map 65 (+)     VAP poa  SIGMOID COLITIS 6/15/2021 CTAP   FUNGURIA 6/15    w 6/14-6/15-6/16-5/16-5/19 w 24-12 - 11.2-11.3 - 9.6   ua 6/15/2021 w 6-10 nitr (-) l estr mod   ct cap nc 6/15/2021 mild sigmoid colitis patchy opac lower lobes possibly aspiration   rvp 6/15/2021 rvp (-)  sp sm 6/15 rare gnr   fungtell 6/19/2021 fungitell 44   mrsa 6/15 (-)   urine 6/15 50-99 c  blod c 6/18 (-)   blod c 6/15 (-)    MEROPENEM 1.2 (6/15/2021)   Complete 8 d course   CAPSO 6/17/2021     VENT MANAGEMENT   VENT BUNDLE Target pH 730 (+) PO2 60 (+) po 90-95% Pplat 35 (-)   HOBE DSV DSBT Restrictive fluid strategy   Gas exchange good on 6/15/2021 12 a     COPD   PROV HFA (6/15/2021)   ATRIV (6/15/2021)   under control    RO MI  Tr 6/15/2021 Tr (-)   echo 6/15 n lvsf   mi ruled out     ANEMIA   Hb 6/146/15-6/16-6/18-6/19 Hb 12.3- 9.7-10.2 -10.5  - 10.7   target hb 7 (+)     HYPERNATREMIA   Na 6/15-6/16-6/17-6/18-6/19/2021 Na 147 -[phone]    monitor    RYAN  cr 6/14-6/15-6/16-6/19 Cr 1.7-1.3-1.1 - 1.1   improvd       OVERALL PLAN.  VAP/SIGMOID COLITIS poa BSAB   CANDIDURIA capsofung 6/17/2021   VENT MANAGMNT   ANEMIA Monitor  RENAL Monitor     TIME SPENT   Over 25 minutes aggregate care time spent on encounter; activities included   direct patient care, counseling and/or coordinating care reviewing notes, lab data/ imaging , discussion with multidisciplinary team/ patient  /family and explaining in detail risks, benefits, alternatives  of the recommendations     CHAPINCITO GUIDRY 75 f [facility] P 6/15/2021   DR ILIANA KMEP

## 2021-06-20 NOTE — PROGRESS NOTE ADULT - ASSESSMENT
76 yo Female ,[facility] resident with hx of  PMH Frontotemporal dementia, CVA, chronic vent dependent respiratory failure, dysphagia s/p PEG, recurrent UTI/VAP presents with septic shock from [facility] facility. She was last admitted at [facility] 10/ 2020 with proteus UTI ,sepsis ,hypernatremia and RYAN .Pseudomonas grew in sputum resistant to zosyn last time Admitted for septic workup and evaluation,send blood and urine cx,serial lactate levels,monitor vitals closley,ivfs hydration,monitor urine output and renal profile,iv abx initiated e. On arrival to ER found to be febrile to 102, WBC 20k, lactate 6.7. CXR with haziness of entire right side, urine appears cloudy. Med hx is significant for Advanced dementia ,s/p  Aphasic stroke , Constipation  ,COVID-19   ,CVA (cerebral vascular accident)  ,Dementia of frontal lobe type  ,Diabetes mellitus  ,DM (diabetes mellitus)type  2   ,GERD (gastroesophageal reflux disease)  ,HTN (hypertension)  ,Hypertension  ,Pneumonia  ,Quadriplegia  ,Respiratory failure ,s/p tracheostomy and peg Palliative care consult requested ,to discuss advance directives and complete /update  MOLST  (15 Zay 2021 07:06)      ACUTE RENAL FAILURE: hypernatremia increase water intake   Serum creatinine is improved  0.83    fentaNYL   Patch  12 MICROgram(s)/Hr 1 Patch Transdermal every 72 hours      Admit for septic workup and ID evaluation,send blood and urine cx,serial lactate levels,monitor vitals closley,ivfs hydration,monitor urine output and renal profile  meropenem  IVPB 1000 milliGRAM(s) IV Intermittent every 12 hours

## 2021-06-20 NOTE — PROGRESS NOTE ADULT - ASSESSMENT
Pt is a 75W from [facility], w/ PMHx of frontotemporal dementia, CVA, CHRF requiring vent, dysphagia s/p PEG, recurrent UTI/VAP p/w septic shock 2/2 UTI vs PNA.    RECOMMENDATIONS  Septic Shock 2/2 recurrent VAP vs recurrent UTI  Candiduria  Shock/Hypotension--off pressors now  ARF--resolved  Leukocytosis--resolved  Prior micro with Pseudomonas aeruginosa -  Piperacillin/Tazobactam: R >64 in sputum, Proteus mirabilis (Indole negative) in urine  6/15 RCx NOF  6/15 UCx +candida albicans - ?colonization vs UTI  6/18 BCx NGTD  MRSA screen negative  Fungitell negative   Trend temps/CBC  Pulmonary toilet  Appreciate ICU care  -c/w meropenem (6/15-)  -c/w caspofungin (added on 6/17) - can plan to complete 5 day course for possible UTI  Duration TBD pending clinical improvement    D/w ICU resident.  Dr. Brantley to resume care from tomorrow.     Billy Valenzuela M.D.  [facility], Division of Infectious Diseases  [phone]  After 5pm on weekdays and all day on weekends - please call [phone]

## 2021-06-21 LAB
ALBUMIN SERPL ELPH-MCNC: 2.6 G/DL — LOW (ref 3.3–5)
ALP SERPL-CCNC: 102 U/L — SIGNIFICANT CHANGE UP (ref 40–120)
ALT FLD-CCNC: 86 U/L — HIGH (ref 12–78)
ANION GAP SERPL CALC-SCNC: 7 MMOL/L — SIGNIFICANT CHANGE UP (ref 5–17)
AST SERPL-CCNC: 68 U/L — HIGH (ref 15–37)
BASOPHILS # BLD AUTO: 0.03 K/UL — SIGNIFICANT CHANGE UP (ref 0–0.2)
BASOPHILS NFR BLD AUTO: 0.5 % — SIGNIFICANT CHANGE UP (ref 0–2)
BILIRUB SERPL-MCNC: 0.4 MG/DL — SIGNIFICANT CHANGE UP (ref 0.2–1.2)
BUN SERPL-MCNC: 23 MG/DL — SIGNIFICANT CHANGE UP (ref 7–23)
CALCIUM SERPL-MCNC: 8.7 MG/DL — SIGNIFICANT CHANGE UP (ref 8.5–10.1)
CHLORIDE SERPL-SCNC: 110 MMOL/L — HIGH (ref 96–108)
CO2 SERPL-SCNC: 25 MMOL/L — SIGNIFICANT CHANGE UP (ref 22–31)
CREAT SERPL-MCNC: 0.78 MG/DL — SIGNIFICANT CHANGE UP (ref 0.5–1.3)
EOSINOPHIL # BLD AUTO: 0.21 K/UL — SIGNIFICANT CHANGE UP (ref 0–0.5)
EOSINOPHIL NFR BLD AUTO: 3.6 % — SIGNIFICANT CHANGE UP (ref 0–6)
GLUCOSE SERPL-MCNC: 118 MG/DL — HIGH (ref 70–99)
HCT VFR BLD CALC: 32.3 % — LOW (ref 34.5–45)
HGB BLD-MCNC: 10.2 G/DL — LOW (ref 11.5–15.5)
IMM GRANULOCYTES NFR BLD AUTO: 0.9 % — SIGNIFICANT CHANGE UP (ref 0–1.5)
LYMPHOCYTES # BLD AUTO: 2.28 K/UL — SIGNIFICANT CHANGE UP (ref 1–3.3)
LYMPHOCYTES # BLD AUTO: 39 % — SIGNIFICANT CHANGE UP (ref 13–44)
MAGNESIUM SERPL-MCNC: 2.6 MG/DL — SIGNIFICANT CHANGE UP (ref 1.6–2.6)
MCHC RBC-ENTMCNC: 27.9 PG — SIGNIFICANT CHANGE UP (ref 27–34)
MCHC RBC-ENTMCNC: 31.6 GM/DL — LOW (ref 32–36)
MCV RBC AUTO: 88.5 FL — SIGNIFICANT CHANGE UP (ref 80–100)
MONOCYTES # BLD AUTO: 0.79 K/UL — SIGNIFICANT CHANGE UP (ref 0–0.9)
MONOCYTES NFR BLD AUTO: 13.5 % — SIGNIFICANT CHANGE UP (ref 2–14)
NEUTROPHILS # BLD AUTO: 2.48 K/UL — SIGNIFICANT CHANGE UP (ref 1.8–7.4)
NEUTROPHILS NFR BLD AUTO: 42.5 % — LOW (ref 43–77)
NRBC # BLD: 0 /100 WBCS — SIGNIFICANT CHANGE UP (ref 0–0)
PHOSPHATE SERPL-MCNC: 1.7 MG/DL — LOW (ref 2.5–4.5)
PLATELET # BLD AUTO: 277 K/UL — SIGNIFICANT CHANGE UP (ref 150–400)
POTASSIUM SERPL-MCNC: 4.4 MMOL/L — SIGNIFICANT CHANGE UP (ref 3.5–5.3)
POTASSIUM SERPL-SCNC: 4.4 MMOL/L — SIGNIFICANT CHANGE UP (ref 3.5–5.3)
PROT SERPL-MCNC: 6.5 G/DL — SIGNIFICANT CHANGE UP (ref 6–8.3)
RBC # BLD: 3.65 M/UL — LOW (ref 3.8–5.2)
RBC # FLD: 15.2 % — HIGH (ref 10.3–14.5)
SODIUM SERPL-SCNC: 142 MMOL/L — SIGNIFICANT CHANGE UP (ref 135–145)
WBC # BLD: 5.84 K/UL — SIGNIFICANT CHANGE UP (ref 3.8–10.5)
WBC # FLD AUTO: 5.84 K/UL — SIGNIFICANT CHANGE UP (ref 3.8–10.5)

## 2021-06-21 PROCEDURE — 99291 CRITICAL CARE FIRST HOUR: CPT

## 2021-06-21 RX ORDER — MIDODRINE HYDROCHLORIDE 2.5 MG/1
5 TABLET ORAL EVERY 8 HOURS
Refills: 0 | Status: DISCONTINUED | OUTPATIENT
Start: 2021-06-21 | End: 2021-06-22

## 2021-06-21 RX ORDER — SODIUM,POTASSIUM PHOSPHATES 278-250MG
2 POWDER IN PACKET (EA) ORAL ONCE
Refills: 0 | Status: COMPLETED | OUTPATIENT
Start: 2021-06-21 | End: 2021-06-21

## 2021-06-21 RX ADMIN — CASPOFUNGIN ACETATE 260 MILLIGRAM(S): 7 INJECTION, POWDER, LYOPHILIZED, FOR SOLUTION INTRAVENOUS at 12:07

## 2021-06-21 RX ADMIN — Medication 1 PUFF(S): at 13:11

## 2021-06-21 RX ADMIN — Medication 1 GRAM(S): at 23:48

## 2021-06-21 RX ADMIN — SENNA PLUS 2 TABLET(S): 8.6 TABLET ORAL at 21:21

## 2021-06-21 RX ADMIN — Medication 1 GRAM(S): at 18:58

## 2021-06-21 RX ADMIN — ALBUTEROL 2 PUFF(S): 90 AEROSOL, METERED ORAL at 01:00

## 2021-06-21 RX ADMIN — PANTOPRAZOLE SODIUM 40 MILLIGRAM(S): 20 TABLET, DELAYED RELEASE ORAL at 05:52

## 2021-06-21 RX ADMIN — POLYETHYLENE GLYCOL 3350 17 GRAM(S): 17 POWDER, FOR SOLUTION ORAL at 18:58

## 2021-06-21 RX ADMIN — FENTANYL CITRATE 1 PATCH: 50 INJECTION INTRAVENOUS at 07:00

## 2021-06-21 RX ADMIN — CHLORHEXIDINE GLUCONATE 1 APPLICATION(S): 213 SOLUTION TOPICAL at 05:52

## 2021-06-21 RX ADMIN — Medication 81 MILLIGRAM(S): at 12:05

## 2021-06-21 RX ADMIN — Medication 5 MILLIGRAM(S): at 21:24

## 2021-06-21 RX ADMIN — Medication 1 PUFF(S): at 20:33

## 2021-06-21 RX ADMIN — MEROPENEM 100 MILLIGRAM(S): 1 INJECTION INTRAVENOUS at 21:20

## 2021-06-21 RX ADMIN — Medication 1 PUFF(S): at 08:15

## 2021-06-21 RX ADMIN — ALBUTEROL 2 PUFF(S): 90 AEROSOL, METERED ORAL at 13:11

## 2021-06-21 RX ADMIN — ALBUTEROL 2 PUFF(S): 90 AEROSOL, METERED ORAL at 08:15

## 2021-06-21 RX ADMIN — Medication 2 MILLIGRAM(S): at 14:47

## 2021-06-21 RX ADMIN — Medication 1 MILLIGRAM(S): at 14:34

## 2021-06-21 RX ADMIN — Medication 1 PUFF(S): at 01:00

## 2021-06-21 RX ADMIN — MEROPENEM 100 MILLIGRAM(S): 1 INJECTION INTRAVENOUS at 05:52

## 2021-06-21 RX ADMIN — POLYETHYLENE GLYCOL 3350 17 GRAM(S): 17 POWDER, FOR SOLUTION ORAL at 05:52

## 2021-06-21 RX ADMIN — MIDODRINE HYDROCHLORIDE 5 MILLIGRAM(S): 2.5 TABLET ORAL at 21:21

## 2021-06-21 RX ADMIN — MIDODRINE HYDROCHLORIDE 10 MILLIGRAM(S): 2.5 TABLET ORAL at 05:52

## 2021-06-21 RX ADMIN — FENTANYL CITRATE 1 PATCH: 50 INJECTION INTRAVENOUS at 21:03

## 2021-06-21 RX ADMIN — Medication 2 PACKET(S): at 09:12

## 2021-06-21 RX ADMIN — MIDODRINE HYDROCHLORIDE 5 MILLIGRAM(S): 2.5 TABLET ORAL at 14:35

## 2021-06-21 RX ADMIN — MEROPENEM 100 MILLIGRAM(S): 1 INJECTION INTRAVENOUS at 14:34

## 2021-06-21 RX ADMIN — Medication 1 MILLIGRAM(S): at 21:21

## 2021-06-21 RX ADMIN — ENOXAPARIN SODIUM 40 MILLIGRAM(S): 100 INJECTION SUBCUTANEOUS at 12:06

## 2021-06-21 RX ADMIN — Medication 1 GRAM(S): at 05:52

## 2021-06-21 RX ADMIN — INSULIN GLARGINE 20 UNIT(S): 100 INJECTION, SOLUTION SUBCUTANEOUS at 21:22

## 2021-06-21 RX ADMIN — ALBUTEROL 2 PUFF(S): 90 AEROSOL, METERED ORAL at 20:33

## 2021-06-21 RX ADMIN — Medication 1 GRAM(S): at 12:07

## 2021-06-21 NOTE — PROGRESS NOTE ADULT - SUBJECTIVE AND OBJECTIVE BOX
Patient is a 75y old  Female who presents with a chief complaint of fever and dyspnea (21 Jun 2021 07:25)    CHARTING IN PROGRESS    24 hour events: ***    REVIEW OF SYSTEMS  Unable to obtain 2/2 mental status    T(F): 96.8 (06-21-21 @ 09:00), Max: 98.8 (06-20-21 @ 23:41)  HR: 66 (06-21-21 @ 09:00) (56 - 134)  BP: 113/57 (06-21-21 @ 09:00) (77/41 - 178/104)  RR: 18 (06-21-21 @ 09:00) (11 - 51)  SpO2: 99% (06-21-21 @ 09:00) (97% - 100%)  Wt(kg): --    Mode: AC/ CMV (Assist Control/ Continuous Mandatory Ventilation), RR (machine): 14, TV (machine): 400, FiO2: 25, PEEP: 5        I&O's Summary    06-20 @ 07:01  -  06-21 @ 07:00  --------------------------------------------------------  IN: 2990 mL / OUT: 950 mL / NET: 2040 mL      PHYSICAL EXAM  General: Elderly, chronic trach to vent, ill-appearing  HEENT: Pupils equal, reactive to light. Symmetric  PULM: Clear to auscultation bilaterally, no significant sputum production. No wheezes or rales.  CVS: Regular rate and rhythm, no murmurs, rubs, or gallops  ABD: Mild distention. Soft,, normoactive bowel sounds, no guarding. +PEG in place, c/d/i  EXT: No edema, nontender  SKIN: Warm and dry   NEURO: opens eyes to voice, awake but does not follow command, contracted UE > LE    MEDICATIONS  caspofungin IVPB   caspofungin IVPB IV Intermittent  meropenem  IVPB IV Intermittent    midodrine. Oral  norepinephrine Infusion IV Continuous    dextrose 40% Gel Oral  dextrose 50% Injectable IV Push  dextrose 50% Injectable IV Push  dextrose 50% Injectable IV Push  glucagon  Injectable IntraMuscular  insulin glargine Injectable (LANTUS) SubCutaneous  insulin lispro (ADMELOG) corrective regimen sliding scale SubCutaneous    ALBUTerol    90 MICROgram(s) HFA Inhaler Inhalation  ipratropium 17 MICROgram(s) HFA Inhaler Inhalation    acetaminophen    Suspension .. Oral PRN  fentaNYL   Patch  12 MICROgram(s)/Hr Transdermal      aspirin  chewable Oral  enoxaparin Injectable SubCutaneous    bisacodyl Oral  pantoprazole   Suspension Oral  polyethylene glycol 3350 Oral  senna Oral  sucralfate suspension Oral      dextrose 5%. IV Continuous  dextrose 5%. IV Continuous      chlorhexidine 2% Cloths Topical                            10.2   5.84  )-----------( 277      ( 21 Jun 2021 05:51 )             32.3       06-21    142  |  110<H>  |  23  ----------------------------<  118<H>  4.4   |  25  |  0.78    Ca    8.7      21 Jun 2021 05:51  Phos  1.7     06-21  Mg     2.6     06-21    TPro  6.5  /  Alb  2.6<L>  /  TBili  0.4  /  DBili  x   /  AST  68<H>  /  ALT  86<H>  /  AlkPhos  102  06-21              .Blood Blood-Peripheral   No growth to date. -- 06-18 @ 01:07        Radiology: ***  Bedside lung ultrasound: ***  Bedside ECHO: ***    CENTRAL LINE: Y/N          DATE INSERTED:              REMOVE: Y/N  REID: Y/N                        DATE INSERTED:              REMOVE: Y/N  A-LINE: Y/N                       DATE INSERTED:              REMOVE: Y/N    GLOBAL ISSUE/BEST PRACTICE  Analgesia:   Sedation:   CAM-ICU:   HOB elevation: yes  Stress ulcer prophylaxis:   VTE prophylaxis:   Glycemic control:   Nutrition:     CODE STATUS: ***  [facility] discussion: Y       Patient is a 75y old  Female who presents with a chief complaint of fever and dyspnea (21 Jun 2021 07:25)    24 hour events: No acute events overnight. Pt remained afebrile overnight and did not require any additional doses of Ativan.     REVIEW OF SYSTEMS  Unable to obtain 2/2 mental status    T(F): 96.8 (06-21-21 @ 09:00), Max: 98.8 (06-20-21 @ 23:41)  HR: 66 (06-21-21 @ 09:00) (56 - 134)  BP: 113/57 (06-21-21 @ 09:00) (77/41 - 178/104)  RR: 18 (06-21-21 @ 09:00) (11 - 51)  SpO2: 99% (06-21-21 @ 09:00) (97% - 100%)  Wt(kg): --    Mode: AC/ CMV (Assist Control/ Continuous Mandatory Ventilation), RR (machine): 14, TV (machine): 400, FiO2: 25, PEEP: 5        I&O's Summary    06-20 @ 07:01  -  06-21 @ 07:00  --------------------------------------------------------  IN: 2990 mL / OUT: 950 mL / NET: 2040 mL      PHYSICAL EXAM  General: Elderly, chronic trach to vent, ill-appearing  HEENT: Pupils equal, reactive to light. Symmetric  PULM: Clear to auscultation bilaterally, no significant sputum production. No wheezes or rales.  CVS: Regular rate and rhythm, no murmurs, rubs, or gallops  ABD: Mild distention. Soft,, normoactive bowel sounds, no guarding. +PEG in place, c/d/i  EXT: No edema, nontender  SKIN: Warm and dry   NEURO: opens eyes to voice, awake but does not follow command, contracted UE > LE    MEDICATIONS  caspofungin IVPB   caspofungin IVPB IV Intermittent  meropenem  IVPB IV Intermittent    midodrine. Oral  norepinephrine Infusion IV Continuous    dextrose 40% Gel Oral  dextrose 50% Injectable IV Push  dextrose 50% Injectable IV Push  dextrose 50% Injectable IV Push  glucagon  Injectable IntraMuscular  insulin glargine Injectable (LANTUS) SubCutaneous  insulin lispro (ADMELOG) corrective regimen sliding scale SubCutaneous    ALBUTerol    90 MICROgram(s) HFA Inhaler Inhalation  ipratropium 17 MICROgram(s) HFA Inhaler Inhalation    acetaminophen    Suspension .. Oral PRN  fentaNYL   Patch  12 MICROgram(s)/Hr Transdermal      aspirin  chewable Oral  enoxaparin Injectable SubCutaneous    bisacodyl Oral  pantoprazole   Suspension Oral  polyethylene glycol 3350 Oral  senna Oral  sucralfate suspension Oral      dextrose 5%. IV Continuous  dextrose 5%. IV Continuous      chlorhexidine 2% Cloths Topical                            10.2   5.84  )-----------( 277      ( 21 Jun 2021 05:51 )             32.3       06-21    142  |  110<H>  |  23  ----------------------------<  118<H>  4.4   |  25  |  0.78    Ca    8.7      21 Jun 2021 05:51  Phos  1.7     06-21  Mg     2.6     06-21    TPro  6.5  /  Alb  2.6<L>  /  TBili  0.4  /  DBili  x   /  AST  68<H>  /  ALT  86<H>  /  AlkPhos  102  06-21        .Blood Blood-Peripheral   No growth to date. -- 06-18 @ 01:07        Radiology: No new imaging      CENTRAL LINE: N           REID: N                 A-LINE: N                          GLOBAL ISSUE/BEST PRACTICE  Analgesia: N  Sedation: N  CAM-ICU: Y  HOB elevation: yes  Stress ulcer prophylaxis: Y  VTE prophylaxis: Y  Glycemic control: Y  Nutrition: Y    CODE STATUS: FULL         Patient is a 75y old  Female who presents with a chief complaint of fever and dyspnea (21 Jun 2021 07:25)    24 hour events: No acute events overnight. Pt remained afebrile overnight and did not require any additional doses of Ativan. During the day after rounds patient developed subsequent episode of tachycardia, tachypnea requiring 1 dose if Ativan 1mg IVP during the day.    REVIEW OF SYSTEMS  Unable to obtain 2/2 mental status    T(F): 96.8 (06-21-21 @ 09:00), Max: 98.8 (06-20-21 @ 23:41)  HR: 66 (06-21-21 @ 09:00) (56 - 134)  BP: 113/57 (06-21-21 @ 09:00) (77/41 - 178/104)  RR: 18 (06-21-21 @ 09:00) (11 - 51)  SpO2: 99% (06-21-21 @ 09:00) (97% - 100%)  Wt(kg): --    Mode: AC/ CMV (Assist Control/ Continuous Mandatory Ventilation), RR (machine): 14, TV (machine): 400, FiO2: 25, PEEP: 5        I&O's Summary    06-20 @ 07:01  -  06-21 @ 07:00  --------------------------------------------------------  IN: 2990 mL / OUT: 950 mL / NET: 2040 mL      PHYSICAL EXAM  General: Elderly, chronic trach to vent, ill-appearing  HEENT: Pupils equal, reactive to light. Symmetric  PULM: Clear to auscultation bilaterally, no significant sputum production. No wheezes or rales.  CVS: Regular rate and rhythm, no murmurs, rubs, or gallops  ABD: Mild distention. Soft,, normoactive bowel sounds, no guarding. +PEG in place, c/d/i  EXT: No edema, nontender  SKIN: Warm and dry   NEURO: opens eyes to voice, awake but does not follow command, contracted UE > LE    MEDICATIONS  caspofungin IVPB   caspofungin IVPB IV Intermittent  meropenem  IVPB IV Intermittent    midodrine. Oral  norepinephrine Infusion IV Continuous    dextrose 40% Gel Oral  dextrose 50% Injectable IV Push  dextrose 50% Injectable IV Push  dextrose 50% Injectable IV Push  glucagon  Injectable IntraMuscular  insulin glargine Injectable (LANTUS) SubCutaneous  insulin lispro (ADMELOG) corrective regimen sliding scale SubCutaneous    ALBUTerol    90 MICROgram(s) HFA Inhaler Inhalation  ipratropium 17 MICROgram(s) HFA Inhaler Inhalation    acetaminophen    Suspension .. Oral PRN  fentaNYL   Patch  12 MICROgram(s)/Hr Transdermal      aspirin  chewable Oral  enoxaparin Injectable SubCutaneous    bisacodyl Oral  pantoprazole   Suspension Oral  polyethylene glycol 3350 Oral  senna Oral  sucralfate suspension Oral      dextrose 5%. IV Continuous  dextrose 5%. IV Continuous      chlorhexidine 2% Cloths Topical                            10.2   5.84  )-----------( 277      ( 21 Jun 2021 05:51 )             32.3       06-21    142  |  110<H>  |  23  ----------------------------<  118<H>  4.4   |  25  |  0.78    Ca    8.7      21 Jun 2021 05:51  Phos  1.7     06-21  Mg     2.6     06-21    TPro  6.5  /  Alb  2.6<L>  /  TBili  0.4  /  DBili  x   /  AST  68<H>  /  ALT  86<H>  /  AlkPhos  102  06-21        .Blood Blood-Peripheral   No growth to date. -- 06-18 @ 01:07        Radiology: No new imaging      CENTRAL LINE: N           REID: N                 A-LINE: N                          GLOBAL ISSUE/BEST PRACTICE  Analgesia: N  Sedation: N  CAM-ICU: Y  HOB elevation: yes  Stress ulcer prophylaxis: Y  VTE prophylaxis: Y  Glycemic control: Y  Nutrition: Y    CODE STATUS: FULL

## 2021-06-21 NOTE — PROGRESS NOTE ADULT - PROBLEM SELECTOR PLAN 10
Gastrointestinal stress ulcer prophylaxis and DVT prophylaxis administered

## 2021-06-21 NOTE — PROGRESS NOTE ADULT - ASSESSMENT
PARASHARAMI BREA 75 f [facility] P 6/15/2021   DR ILIANA KEMP      REVIEW OF SYMPTOMS      Able to give (reliable) ROS  NO     PHYSICAL EXAM    HEENT Unremarkable  atraumatic   RESP Fair air entry EXP prolonged    Harsh breath sound Resp distres mild   CARDIAC S1 S2 No S3     NO JVD    ABDOMEN SOFT BS PRESENT NOT DISTENDED No hepatosplenomegaly   PEDAL EDEMA present No calf tenderness  NO rash     PARASHARAMI BREA 75 f [facility] P 6/15/2021 ADMISSION PROBLEMS    COVID STATUS   scv2 6/15/2021 (-)   spkab 6/16 (+)     SEPTIC SHOCK poa  resolvd 6/16/2021   NOREPI NEEDED 6/19/2021 BRIEFLY     VAP poa  SIGMOID COLITIS 6/15/2021 CTAP    CANDIDUIA 6/17/2021   CASPOFUNGIN 6/17/2021     COPD   ANEMIA   HYPERNATREMIA r  FREE WATER 6/17/2021   RYAN resolvd 6/16/2021     PMH VENT DEPENDENT   PMH TRACH  PMH PEG   PMH UTI VAP   PMH ZOSYN RESISTANT PSEUDOMONAS   PMH DEMENTIA   PMH CVA .    GLOBAL AND BEST PRACTICE ISSUES                     ALLERGY.    CODEINE   WEIGHT.     6/15/2021 53                         BMI               6/15/2021 20.                                         .                          ADVANCED DIRECTIVE.                               HEAD OF BED ELEVATION. Yes  DVT PROPHYLAXIS.   HPSC (6/15/2021)   -> lvnx 40 96/18/2021)                  SQUIRES PROPHYLAXIS.       PROTONIX 40 (6/15/2021)   APA.      ASA 81 (6/15/2021)                                                               DYSPHAGIA RECOMM.   DIET.    GLUCERNA 1.5 1000 (6/15/2021)   INFECTION PROPHYLAXIS.   CHLORHEXIDINE 2% (6/15/2021)   FREE WATER.    250.6 (6/17/2021)     PATIENT DATA                ABG.     6/15/2021 12 40% /400/5/14 742/34/166              OXYGENATION.                   VITALS/IO/VENT/DRIPS.   6/21/2021 afeb 68 113/57   6/21/2021 8a 14/400/5/.25     ASSESSMENT/RECOMMENDATIONS.    SEPTIC SHOCK poa   target map 65 (+)     VAP poa  SIGMOID COLITIS 6/15/2021 CTAP   FUNGURIA 6/15    w 6/14-6/15-6/16-5/16-5/19-5/21 w 24-12 - 11.2-11.3 - 9.6 - 5.8   ua 6/15/2021 w 6-10 nitr (-) l estr mod   ct cap nc 6/15/2021 mild sigmoid colitis patchy opac lower lobes possibly aspiration   rvp 6/15/2021 rvp (-)  sp sm 6/15 rare gnr   fungtell 6/19/2021 fungitell 44   mrsa 6/15 (-)   urine 6/15 50-99 c  blod c 6/18 (-)   blod c 6/15 (-)    MEROPENEM 1.2 (6/15/2021)   Complete 8 d course   CAPSO 6/17/2021       VENT MANAGEMENT   VENT BUNDLE Target pH 730 (+) PO2 60 (+) po 90-95% Pplat 35 (-)   HOBE DSV DSBT Restrictive fluid strategy   Gas exchange good on 6/15/2021 12 a     COPD   PROV HFA (6/15/2021)   ATRIV (6/15/2021)   under control    RO MI  Tr 6/15/2021 Tr (-)   echo 6/15 n lvsf   mi ruled out     ANEMIA   Hb 6/146/15-6/16-6/18-6/19-5/21 Hb 12.3- 9.7-10.2 -10.5  - 10.7 -  1-.2   target hb 7 (+)     HYPERNATREMIA   Na 6/15-6/16-6/17-6/18-6/19/2021 Na 147 -[phone]    monitor    RYAN  cr 6/14-6/15-6/16-6/19 Cr 1.7-1.3-1.1 - 1.1   improvd       OVERALL PLAN.  VAP/SIGMOID COLITIS poa BSAB   CANDIDURIA capsofung 6/17/2021   VENT MANAGMNT   ANEMIA Monitor  RENAL Monitor       TIME SPENT   Over 25 minutes aggregate care time spent on encounter; activities included   direct patient care, counseling and/or coordinating care reviewing notes, lab data/ imaging , discussion with multidisciplinary team/ patient  /family and explaining in detail risks, benefits, alternatives  of the recommendations     CHAPINCITO GUIDRY 75 f [facility] P 6/15/2021   DR ILIANA KEMP

## 2021-06-21 NOTE — PROGRESS NOTE ADULT - SUBJECTIVE AND OBJECTIVE BOX
PROGRESS NOTE  Patient is a 75y old  Female who presents with a chief complaint of fever and dyspnea (21 Jun 2021 09:48)  Chart and available morning labs /imaging are reviewed electronically , urgent issues addressed . More information  is being added upon completion of rounds , when more information is collected and management discussed with consultants , medical staff and social service/case management on the floor     OVERNIGHT  No acute overnight events. Afebrile overnight    HPI:  76 yo Female ,[facility] resident with hx of  PMH Frontotemporal dementia, CVA, chronic vent dependent respiratory failure, dysphagia s/p PEG, recurrent UTI/VAP presents with septic shock from [facility] facility. She was last admitted at [facility] 10/ 2020 with proteus UTI ,sepsis ,hypernatremia and RYAN .Pseudomonas grew in sputum resistant to zosyn last time Admitted for septic workup and evaluation,send blood and urine cx,serial lactate levels,monitor vitals closley,ivfs hydration,monitor urine output and renal profile,iv abx initiated e. On arrival to ER found to be febrile to 102, WBC 20k, lactate 6.7. CXR with haziness of entire right side, urine appears cloudy. Med hx is significant for Advanced dementia ,s/p  Aphasic stroke , Constipation  ,COVID-19   ,CVA (cerebral vascular accident)  ,Dementia of frontal lobe type  ,Diabetes mellitus  ,DM (diabetes mellitus)type  2   ,GERD (gastroesophageal reflux disease)  ,HTN (hypertension)  ,Hypertension  ,Pneumonia  ,Quadriplegia  ,Respiratory failure ,s/p tracheostomy and peg Palliative care consult requested ,to discuss advance directives and complete /update  MOLST  (15 Zay 2021 07:06)    PAST MEDICAL & SURGICAL HISTORY:  Dementia of frontal lobe type    Aphasic stroke    Diabetes mellitus    Respiratory failure    Hypertension    GERD (gastroesophageal reflux disease)    Constipation    Respiratory failure    CVA (cerebral vascular accident)    HTN (hypertension)    DM (diabetes mellitus)    Advanced dementia    COVID-19 virus detected    Quadriplegia    Pneumonia    Type II diabetes mellitus    Hx of appendectomy    Gastrostomy in place    Tracheostomy in place    Tracheostomy tube present    Feeding by G-tube        MEDICATIONS  (STANDING):  ALBUTerol    90 MICROgram(s) HFA Inhaler 2 Puff(s) Inhalation every 6 hours  aspirin  chewable 81 milliGRAM(s) Oral daily  bisacodyl 5 milliGRAM(s) Oral at bedtime  caspofungin IVPB      caspofungin IVPB 50 milliGRAM(s) IV Intermittent every 24 hours  chlorhexidine 2% Cloths 1 Application(s) Topical <User Schedule>  dextrose 40% Gel 15 Gram(s) Oral once  dextrose 5%. 1000 milliLiter(s) (50 mL/Hr) IV Continuous <Continuous>  dextrose 5%. 1000 milliLiter(s) (100 mL/Hr) IV Continuous <Continuous>  dextrose 50% Injectable 25 Gram(s) IV Push once  dextrose 50% Injectable 12.5 Gram(s) IV Push once  dextrose 50% Injectable 25 Gram(s) IV Push once  enoxaparin Injectable 40 milliGRAM(s) SubCutaneous daily  fentaNYL   Patch  12 MICROgram(s)/Hr 1 Patch Transdermal every 72 hours  glucagon  Injectable 1 milliGRAM(s) IntraMuscular once  insulin glargine Injectable (LANTUS) 20 Unit(s) SubCutaneous at bedtime  insulin lispro (ADMELOG) corrective regimen sliding scale   SubCutaneous every 6 hours  ipratropium 17 MICROgram(s) HFA Inhaler 1 Puff(s) Inhalation every 6 hours  meropenem  IVPB 1000 milliGRAM(s) IV Intermittent every 8 hours  midodrine. 10 milliGRAM(s) Oral three times a day  norepinephrine Infusion 0.05 MICROgram(s)/kG/Min (4.97 mL/Hr) IV Continuous <Continuous>  pantoprazole   Suspension 40 milliGRAM(s) Oral before breakfast  polyethylene glycol 3350 17 Gram(s) Oral every 12 hours  senna 2 Tablet(s) Oral at bedtime  sucralfate suspension 1 Gram(s) Oral every 6 hours    MEDICATIONS  (PRN):  acetaminophen    Suspension .. 650 milliGRAM(s) Oral every 6 hours PRN Temp greater or equal to 38C (100.4F)      OBJECTIVE    T(C): 36 (06-21-21 @ 09:00), Max: 37.1 (06-20-21 @ 23:41)  HR: 66 (06-21-21 @ 09:00) (56 - 134)  BP: 113/57 (06-21-21 @ 09:00) (77/41 - 178/104)  RR: 18 (06-21-21 @ 09:00) (11 - 51)  SpO2: 99% (06-21-21 @ 09:00) (97% - 100%)  Wt(kg): --  I&O's Summary    20 Jun 2021 07:01  -  21 Jun 2021 07:00  --------------------------------------------------------  IN: 2990 mL / OUT: 950 mL / NET: 2040 mL  REVIEW OF SYSTEMS:  Patient is  unable to provide any information/ROS  due to baseline mental status.   PHYSICAL EXAM: vent/trach   Appearance: NAD. VS past 24 hrs -as above   HEENT:   Moist oral mucosa. Conjunctiva clear b/l.   Neck : supple  Respiratory: Lungs diminished bs at bases   Gastrointestinal:  Soft, nontender. No rebound. No rigidity. BS present	peg in place   Cardiovascular: RRR ,S1S2 present  Neurologic: Non-focal. Moving all extremities.  Extremities: No edema. No erythema. No calf tenderness.  Skin: No rashes, No ecchymoses, No cyanosis.	  wounds ,skin lesions-See skin assesment flow sheet   LABS:                        10.2   5.84  )-----------( 277      ( 21 Jun 2021 05:51 )             32.3     06-21    142  |  110<H>  |  23  ----------------------------<  118<H>  4.4   |  25  |  0.78    Ca    8.7      21 Jun 2021 05:51  Phos  1.7     06-21  Mg     2.6     06-21    TPro  6.5  /  Alb  2.6<L>  /  TBili  0.4  /  DBili  x   /  AST  68<H>  /  ALT  86<H>  /  AlkPhos  102  06-21    CAPILLARY BLOOD GLUCOSE      POCT Blood Glucose.: 103 mg/dL (21 Jun 2021 05:50)  POCT Blood Glucose.: 118 mg/dL (20 Jun 2021 23:22)  POCT Blood Glucose.: 115 mg/dL (20 Jun 2021 17:23)  POCT Blood Glucose.: 161 mg/dL (20 Jun 2021 11:45)          Culture - Blood (collected 18 Jun 2021 01:07)  Source: .Blood Blood-Venous  Preliminary Report (19 Jun 2021 02:01):    No growth to date.    Culture - Blood (collected 18 Jun 2021 01:07)  Source: .Blood Blood-Peripheral  Preliminary Report (19 Jun 2021 02:01):    No growth to date.    Culture - Urine (collected 15 Zay 2021 16:42)  Source: .Urine Catheterized  Final Report (16 Jun 2021 21:47):    50,000 - 99,000 CFU/mL Presumptive Candida albicans    "Susceptibilities not performed"    Culture - Sputum (collected 15 Zay 2021 16:28)  Source: .Sputum Sputum  Gram Stain (15 Zay 2021 18:57):    Few polymorphonuclear leukocytes per low power field    Few Squamous epithelial cells per low power field    Rare Gram Negative Rods per oil power field  Final Report (17 Jun 2021 17:45):    Normal Respiratory Elvira present    Culture - Blood (collected 15 Zay 2021 04:41)  Source: .Blood Blood-Peripheral  Final Report (20 Jun 2021 05:00):    No Growth Final    Culture - Blood (collected 15 Zay 2021 04:41)  Source: .Blood Blood-Peripheral  Final Report (20 Jun 2021 05:00):    No Growth Final      RADIOLOGY & ADDITIONAL TESTS:   reviewed elctronically  ASSESSMENT/PLAN:

## 2021-06-21 NOTE — PROGRESS NOTE ADULT - SUBJECTIVE AND OBJECTIVE BOX
Date/Time Patient Seen:  		  Referring MD:   Data Reviewed	       Patient is a 75y old  Female who presents with a chief complaint of fever and dyspnea (20 Jun 2021 11:38)      Subjective/HPI     PAST MEDICAL & SURGICAL HISTORY:  Dementia of frontal lobe type    Aphasic stroke    Diabetes mellitus    Respiratory failure    Hypertension    GERD (gastroesophageal reflux disease)    Constipation    Respiratory failure    CVA (cerebral vascular accident)    HTN (hypertension)    DM (diabetes mellitus)    Advanced dementia    COVID-19 virus detected    Quadriplegia    Pneumonia    Type II diabetes mellitus    Hx of appendectomy    Gastrostomy in place    Tracheostomy in place    Tracheostomy tube present    Feeding by G-tube          Medication list         MEDICATIONS  (STANDING):  ALBUTerol    90 MICROgram(s) HFA Inhaler 2 Puff(s) Inhalation every 6 hours  aspirin  chewable 81 milliGRAM(s) Oral daily  bisacodyl 5 milliGRAM(s) Oral at bedtime  caspofungin IVPB 50 milliGRAM(s) IV Intermittent every 24 hours  caspofungin IVPB      chlorhexidine 2% Cloths 1 Application(s) Topical <User Schedule>  dextrose 40% Gel 15 Gram(s) Oral once  dextrose 5%. 1000 milliLiter(s) (50 mL/Hr) IV Continuous <Continuous>  dextrose 5%. 1000 milliLiter(s) (100 mL/Hr) IV Continuous <Continuous>  dextrose 50% Injectable 25 Gram(s) IV Push once  dextrose 50% Injectable 12.5 Gram(s) IV Push once  dextrose 50% Injectable 25 Gram(s) IV Push once  enoxaparin Injectable 40 milliGRAM(s) SubCutaneous daily  fentaNYL   Patch  12 MICROgram(s)/Hr 1 Patch Transdermal every 72 hours  glucagon  Injectable 1 milliGRAM(s) IntraMuscular once  insulin glargine Injectable (LANTUS) 20 Unit(s) SubCutaneous at bedtime  insulin lispro (ADMELOG) corrective regimen sliding scale   SubCutaneous every 6 hours  ipratropium 17 MICROgram(s) HFA Inhaler 1 Puff(s) Inhalation every 6 hours  meropenem  IVPB 1000 milliGRAM(s) IV Intermittent every 8 hours  midodrine. 10 milliGRAM(s) Oral three times a day  norepinephrine Infusion 0.05 MICROgram(s)/kG/Min (4.97 mL/Hr) IV Continuous <Continuous>  pantoprazole   Suspension 40 milliGRAM(s) Oral before breakfast  polyethylene glycol 3350 17 Gram(s) Oral every 12 hours  senna 2 Tablet(s) Oral at bedtime  sucralfate suspension 1 Gram(s) Oral every 6 hours    MEDICATIONS  (PRN):  acetaminophen    Suspension .. 650 milliGRAM(s) Oral every 6 hours PRN Temp greater or equal to 38C (100.4F)         Vitals log        ICU Vital Signs Last 24 Hrs  T(C): 36.7 (21 Jun 2021 04:16), Max: 37.1 (20 Jun 2021 23:41)  T(F): 98.1 (21 Jun 2021 04:16), Max: 98.8 (20 Jun 2021 23:41)  HR: 70 (21 Jun 2021 05:28) (56 - 134)  BP: 112/56 (21 Jun 2021 05:00) (77/41 - 178/104)  BP(mean): 81 (21 Jun 2021 05:00) (53 - 136)  ABP: --  ABP(mean): --  RR: 19 (21 Jun 2021 05:00) (11 - 51)  SpO2: 98% (21 Jun 2021 05:28) (97% - 100%)       Mode: AC/ CMV (Assist Control/ Continuous Mandatory Ventilation)  RR (machine): 14  TV (machine): 400  FiO2: 25  PEEP: 5  ITime: 1  MAP: 11  PIP: 20      Input and Output:  I&O's Detail    19 Jun 2021 07:01  -  20 Jun 2021 07:00  --------------------------------------------------------  IN:    Enteral Tube Flush: 190 mL    Free Water: 1250 mL    Glucerna 1.5: 1000 mL    IV PiggyBack: 150 mL    IV PiggyBack: 100 mL    Norepinephrine: 31 mL  Total IN: 2721 mL    OUT:  Total OUT: 0 mL    Total NET: 2721 mL      20 Jun 2021 07:01  -  21 Jun 2021 05:53  --------------------------------------------------------  IN:    Enteral Tube Flush: 90 mL    Free Water: 1250 mL    Glucerna 1.5: 900 mL    IV PiggyBack: 150 mL    IV PiggyBack: 100 mL  Total IN: 2490 mL    OUT:    Norepinephrine: 0 mL    Voided (mL): 250 mL  Total OUT: 250 mL    Total NET: 2240 mL          Lab Data                        9.8    5.38  )-----------( 261      ( 20 Jun 2021 05:34 )             32.0     06-20    143  |  109<H>  |  25<H>  ----------------------------<  106<H>  4.5   |  27  |  0.87    Ca    8.4<L>      20 Jun 2021 05:34  Phos  2.1     06-20  Mg     2.6     06-20    TPro  6.1  /  Alb  2.5<L>  /  TBili  0.3  /  DBili  x   /  AST  94<H>  /  ALT  99<H>  /  AlkPhos  93  06-20            Review of Systems	      Objective     Physical Examination    heart s1s2  lung dec BS  abd soft  head nc      Pertinent Lab findings & Imaging      Annalise:  NO   Adequate UO     I&O's Detail    19 Jun 2021 07:01  -  20 Jun 2021 07:00  --------------------------------------------------------  IN:    Enteral Tube Flush: 190 mL    Free Water: 1250 mL    Glucerna 1.5: 1000 mL    IV PiggyBack: 150 mL    IV PiggyBack: 100 mL    Norepinephrine: 31 mL  Total IN: 2721 mL    OUT:  Total OUT: 0 mL    Total NET: 2721 mL      20 Jun 2021 07:01  -  21 Jun 2021 05:53  --------------------------------------------------------  IN:    Enteral Tube Flush: 90 mL    Free Water: 1250 mL    Glucerna 1.5: 900 mL    IV PiggyBack: 150 mL    IV PiggyBack: 100 mL  Total IN: 2490 mL    OUT:    Norepinephrine: 0 mL    Voided (mL): 250 mL  Total OUT: 250 mL    Total NET: 2240 mL               Discussed with:     Cultures:	        Radiology

## 2021-06-21 NOTE — PROGRESS NOTE ADULT - ASSESSMENT
Acute infection tx in progress  pt remains full code  updated   ID and CCM notes follow up reviewed  monitor HD - Pressors as needed to keep MAP > 60, Midodrine added    76 yo female hx of aphasic stroke, covid 19, trach/vented, dm BIBEMS from [facility] for respiratory distress, found to have fever here 101.8  sepsis eval in progress  on ABX - cx in progress - on Caspo and Meropenem -   supportive ICU measures  vent support  not a candidate for weaning  spoke with   pt is full code  pain rx regimen - Fentanyl - Opioids -

## 2021-06-21 NOTE — PROGRESS NOTE ADULT - SUBJECTIVE AND OBJECTIVE BOX
[facility], Division of Infectious Diseases  MOIZ Lora Y. Patel, S. Shah  [phone]    Name: BREA BECKHAM  Age: 75y  Gender: Female  MRN: [id number]    Interval History:  Patient seen and examined at bedside this morning  No acute overnight events. Afebrile overnight  Notes reviewed    Antibiotics:  caspofungin IVPB      caspofungin IVPB 50 milliGRAM(s) IV Intermittent every 24 hours  meropenem  IVPB 1000 milliGRAM(s) IV Intermittent every 8 hours      Medications:  acetaminophen    Suspension .. 650 milliGRAM(s) Oral every 6 hours PRN  ALBUTerol    90 MICROgram(s) HFA Inhaler 2 Puff(s) Inhalation every 6 hours  aspirin  chewable 81 milliGRAM(s) Oral daily  bisacodyl 5 milliGRAM(s) Oral at bedtime  caspofungin IVPB      caspofungin IVPB 50 milliGRAM(s) IV Intermittent every 24 hours  chlorhexidine 2% Cloths 1 Application(s) Topical <User Schedule>  dextrose 40% Gel 15 Gram(s) Oral once  dextrose 5%. 1000 milliLiter(s) IV Continuous <Continuous>  dextrose 5%. 1000 milliLiter(s) IV Continuous <Continuous>  dextrose 50% Injectable 25 Gram(s) IV Push once  dextrose 50% Injectable 12.5 Gram(s) IV Push once  dextrose 50% Injectable 25 Gram(s) IV Push once  enoxaparin Injectable 40 milliGRAM(s) SubCutaneous daily  fentaNYL   Patch  12 MICROgram(s)/Hr 1 Patch Transdermal every 72 hours  glucagon  Injectable 1 milliGRAM(s) IntraMuscular once  insulin glargine Injectable (LANTUS) 20 Unit(s) SubCutaneous at bedtime  insulin lispro (ADMELOG) corrective regimen sliding scale   SubCutaneous every 6 hours  ipratropium 17 MICROgram(s) HFA Inhaler 1 Puff(s) Inhalation every 6 hours  meropenem  IVPB 1000 milliGRAM(s) IV Intermittent every 8 hours  midodrine. 10 milliGRAM(s) Oral three times a day  norepinephrine Infusion 0.05 MICROgram(s)/kG/Min IV Continuous <Continuous>  pantoprazole   Suspension 40 milliGRAM(s) Oral before breakfast  polyethylene glycol 3350 17 Gram(s) Oral every 12 hours  senna 2 Tablet(s) Oral at bedtime  sucralfate suspension 1 Gram(s) Oral every 6 hours      Review of Systems:  unable to obtain    Allergies: codeine (Hives)    For details regarding the patient's past medical history, social history, family history, and other miscellaneous elements, please refer the initial infectious diseases consultation and/or the admitting history and physical examination for this admission.    Objective:  Vitals:   T(C): 36 (06-21-21 @ 09:00), Max: 37.1 (06-20-21 @ 23:41)  HR: 66 (06-21-21 @ 09:00) (56 - 134)  BP: 113/57 (06-21-21 @ 09:00) (77/41 - 178/104)  RR: 18 (06-21-21 @ 09:00) (11 - 51)  SpO2: 99% (06-21-21 @ 09:00) (97% - 100%)    Physical Examination:  General: no acute distress  HEENT: NC/AT, anicteric, neck supple, trach to vent  Cardio: S1, S2 heard, RRR, no murmurs  Resp: MV breath sounds  Abd: soft, no grimacing on palpation  Neuro: awake, not following   Ext: no edema or cyanosis  Skin: warm, dry, no visible rash    Laboratory Studies:  CBC:                       10.2   5.84  )-----------( 277      ( 21 Jun 2021 05:51 )             32.3     CMP: 06-21    142  |  110<H>  |  23  ----------------------------<  118<H>  4.4   |  25  |  0.78    Ca    8.7      21 Jun 2021 05:51  Phos  1.7     06-21  Mg     2.6     06-21    TPro  6.5  /  Alb  2.6<L>  /  TBili  0.4  /  DBili  x   /  AST  68<H>  /  ALT  86<H>  /  AlkPhos  102  06-21    LIVER FUNCTIONS - ( 21 Jun 2021 05:51 )  Alb: 2.6 g/dL / Pro: 6.5 g/dL / ALK PHOS: 102 U/L / ALT: 86 U/L / AST: 68 U/L / GGT: x               Microbiology: reviewed    Culture - Blood (collected 06-18-21 @ 01:07)  Source: .Blood Blood-Venous  Preliminary Report (06-19-21 @ 02:01):    No growth to date.    Culture - Blood (collected 06-18-21 @ 01:07)  Source: .Blood Blood-Peripheral  Preliminary Report (06-19-21 @ 02:01):    No growth to date.    Culture - Urine (collected 06-15-21 @ 16:42)  Source: .Urine Catheterized  Final Report (06-16-21 @ 21:47):    50,000 - 99,000 CFU/mL Presumptive Candida albicans    "Susceptibilities not performed"    Culture - Sputum (collected 06-15-21 @ 16:28)  Source: .Sputum Sputum  Gram Stain (06-15-21 @ 18:57):    Few polymorphonuclear leukocytes per low power field    Few Squamous epithelial cells per low power field    Rare Gram Negative Rods per oil power field  Final Report (06-17-21 @ 17:45):    Normal Respiratory Elvira present    Culture - Blood (collected 06-15-21 @ 04:41)  Source: .Blood Blood-Peripheral  Final Report (06-20-21 @ 05:00):    No Growth Final    Culture - Blood (collected 06-15-21 @ 04:41)  Source: .Blood Blood-Peripheral  Final Report (06-20-21 @ 05:00):    No Growth Final        Radiology: reviewed

## 2021-06-21 NOTE — PROGRESS NOTE ADULT - ATTENDING COMMENTS
Patient seen and examined, agree with above     Imp:   HCAP, ?Candidate UTI (no chronic Woody)   Resolved septic shock   Autonomic dysfunction vs neuro event   Hx FTD, CVA, contractures, vent dependent chronic resp failure (s/p trach)   Dysphagia, s/p PEG     Plan:   Mental status at baseline   Episodes of tachycardia, tachypnea, tremors improve with Ativan however she develops significant hypotension after, unsure if the event is due to seizures vs underlying FTD/CVA, will d/w  if she had been evaluated by neurology and review past neuro w/u   BP otherwise stable - wean midodrine   Continue vent support   On meropenem, caspofungin, cultures have been mostly negative, ID following   Hx constipation - having multiple BMs, continue bowel regimen   DVT ppx with Lovenox   Prognosis extremely poor and patient should be hospice, multiple GOC conversations have been held with family who wants her to remains full code    updated at bedside Patient seen and examined, agree with above     Imp:   HCAP, ?Candidate UTI (no chronic Woody)   Resolved septic shock   Autonomic dysfunction vs neuro event   Hx FTD, CVA, contractures, vent dependent chronic resp failure (s/p trach)   Dysphagia, s/p PEG     Plan:   Mental status at baseline   Episodes of tachycardia, tachypnea, tremors improve with Ativan however she develops significant hypotension after, unsure if the event is due to seizures vs underlying FTD/CVA, will d/w  if she had been evaluated by neurology and review past neuro w/u   Given significant hypotension with prn iv Ativan will place her on standing po Ativan to prevent her from having those episodes   BP otherwise stable - wean midodrine   Continue vent support   On meropenem, caspofungin, cultures have been mostly negative, ID following   Hx constipation - having multiple BMs, continue bowel regimen   DVT ppx with Lovenox   Prognosis extremely poor and patient should be hospice, multiple GOC conversations have been held with family who wants her to remains full code    updated at bedside Patient seen and examined, agree with above     Imp:   HCAP, ?Candidate UTI (no chronic Woody)   Resolved septic shock   Autonomic dysfunction vs neuro event   Hx FTD, CVA, contractures, vent dependent chronic resp failure (s/p trach)   Dysphagia, s/p PEG     Plan:   Mental status at baseline   Episodes of tachycardia, tachypnea, tremors improve with Ativan however she develops significant hypotension after, unsure if the event is due to seizures vs underlying FTD/CVA, will d/w  if she had been evaluated by neurology and review past neuro w/u   Given significant hypotension with prn iv Ativan will place her on standing po Ativan to prevent her from having those episodes   BP otherwise stable - wean midodrine   Continue vent support   On meropenem, caspofungin, cultures have been mostly negative, ID following   Hx constipation - having multiple BMs, continue bowel regimen   DVT ppx with Lovenox   Prognosis extremely poor and patient should be hospice, multiple GOC conversations have been held with family who wants her to remains full code    updated at bedside  Stable for transfer to , start discharge planning, called SW

## 2021-06-21 NOTE — PROGRESS NOTE ADULT - SUBJECTIVE AND OBJECTIVE BOX
Patient is a 75y Female whom presented to the hospital with ckd and manasa     PAST MEDICAL & SURGICAL HISTORY:  Dementia of frontal lobe type    Aphasic stroke    Diabetes mellitus    Respiratory failure    Hypertension    GERD (gastroesophageal reflux disease)    Constipation    Respiratory failure    CVA (cerebral vascular accident)    HTN (hypertension)    DM (diabetes mellitus)    Advanced dementia    COVID-19 virus detected    Quadriplegia    Pneumonia    Type II diabetes mellitus    Hx of appendectomy    Gastrostomy in place    Tracheostomy in place    Tracheostomy tube present    Feeding by G-tube        MEDICATIONS  (STANDING):  ALBUTerol    90 MICROgram(s) HFA Inhaler 2 Puff(s) Inhalation every 6 hours  aspirin  chewable 81 milliGRAM(s) Oral daily  chlorhexidine 2% Cloths 1 Application(s) Topical <User Schedule>  dextrose 40% Gel 15 Gram(s) Oral once  dextrose 5%. 1000 milliLiter(s) (50 mL/Hr) IV Continuous <Continuous>  dextrose 5%. 1000 milliLiter(s) (100 mL/Hr) IV Continuous <Continuous>  dextrose 50% Injectable 25 Gram(s) IV Push once  dextrose 50% Injectable 12.5 Gram(s) IV Push once  dextrose 50% Injectable 25 Gram(s) IV Push once  glucagon  Injectable 1 milliGRAM(s) IntraMuscular once  heparin   Injectable 5000 Unit(s) SubCutaneous every 8 hours  insulin glargine Injectable (LANTUS) 20 Unit(s) SubCutaneous at bedtime  insulin lispro (ADMELOG) corrective regimen sliding scale   SubCutaneous every 6 hours  ipratropium 17 MICROgram(s) HFA Inhaler 1 Puff(s) Inhalation every 6 hours  meropenem  IVPB      meropenem  IVPB 1000 milliGRAM(s) IV Intermittent every 12 hours  pantoprazole   Suspension 40 milliGRAM(s) Oral before breakfast  polyethylene glycol 3350 17 Gram(s) Oral daily  senna 2 Tablet(s) Oral at bedtime      Allergies    codeine (Hives)    Intolerances        SOCIAL HISTORY:  Denies ETOh,Smoking,     FAMILY HISTORY:  No pertinent family history in first degree relatives        REVIEW OF SYSTEMS:    unable to obtained a good review system                                                                                        10.2   5.84  )-----------( 277      ( 21 Jun 2021 05:51 )             32.3       CBC Full  -  ( 21 Jun 2021 05:51 )  WBC Count : 5.84 K/uL  RBC Count : 3.65 M/uL  Hemoglobin : 10.2 g/dL  Hematocrit : 32.3 %  Platelet Count - Automated : 277 K/uL  Mean Cell Volume : 88.5 fl  Mean Cell Hemoglobin : 27.9 pg  Mean Cell Hemoglobin Concentration : 31.6 gm/dL  Auto Neutrophil # : 2.48 K/uL  Auto Lymphocyte # : 2.28 K/uL  Auto Monocyte # : 0.79 K/uL  Auto Eosinophil # : 0.21 K/uL  Auto Basophil # : 0.03 K/uL  Auto Neutrophil % : 42.5 %  Auto Lymphocyte % : 39.0 %  Auto Monocyte % : 13.5 %  Auto Eosinophil % : 3.6 %  Auto Basophil % : 0.5 %      06-21    142  |  110<H>  |  23  ----------------------------<  118<H>  4.4   |  25  |  0.78    Ca    8.7      21 Jun 2021 05:51  Phos  1.7     06-21  Mg     2.6     06-21    TPro  6.5  /  Alb  2.6<L>  /  TBili  0.4  /  DBili  x   /  AST  68<H>  /  ALT  86<H>  /  AlkPhos  102  06-21      CAPILLARY BLOOD GLUCOSE      POCT Blood Glucose.: 106 mg/dL (21 Jun 2021 18:57)  POCT Blood Glucose.: 146 mg/dL (21 Jun 2021 12:12)  POCT Blood Glucose.: 103 mg/dL (21 Jun 2021 05:50)  POCT Blood Glucose.: 118 mg/dL (20 Jun 2021 23:22)      Vital Signs Last 24 Hrs  T(C): 37.3 (21 Jun 2021 15:16), Max: 37.3 (21 Jun 2021 15:16)  T(F): 99.1 (21 Jun 2021 15:16), Max: 99.1 (21 Jun 2021 15:16)  HR: 72 (21 Jun 2021 19:00) (56 - 102)  BP: 108/58 (21 Jun 2021 19:00) (90/44 - 165/72)  BP(mean): 80 (21 Jun 2021 19:00) (63 - 105)  RR: 18 (21 Jun 2021 19:00) (13 - 39)  SpO2: 100% (21 Jun 2021 19:00) (98% - 100%)                              PHYSICAL EXAM:    Constitutional: NAD  Neck:  No JVD  Respiratory: decrease bs b/l , pos trach   Cardiovascular: S1 and S2  Gastrointestinal: BS+, soft, pos peg   Extremities: No peripheral edema

## 2021-06-21 NOTE — PROGRESS NOTE ADULT - ASSESSMENT
74 yo Female ,[facility] resident with hx of  PMH Frontotemporal dementia, CVA, chronic vent dependent respiratory failure, dysphagia s/p PEG, recurrent UTI/VAP presents with septic shock from [facility] facility. She was last admitted at [facility] 10/ 2020 with proteus UTI ,sepsis ,hypernatremia and RYAN .Pseudomonas grew in sputum resistant to zosyn last time Admitted for septic workup and evaluation,send blood and urine cx,serial lactate levels,monitor vitals closley,ivfs hydration,monitor urine output and renal profile,iv abx initiated e. On arrival to ER found to be febrile to 102, WBC 20k, lactate 6.7. CXR with haziness of entire right side, urine appears cloudy. Med hx is significant for Advanced dementia ,s/p  Aphasic stroke , Constipation  ,COVID-19   ,CVA (cerebral vascular accident)  ,Dementia of frontal lobe type  ,Diabetes mellitus  ,DM (diabetes mellitus)  ,GERD (gastroesophageal reflux disease)  ,HTN (hypertension)  ,Hypertension  ,Pneumonia  ,Quadriplegia  ,Respiratory failure ,s/p tracheostomy and peg Palliative care consult requested ,to discuss advance directives and complete /update  [facility]

## 2021-06-21 NOTE — PROGRESS NOTE ADULT - ASSESSMENT
74F PMH Frontotemporal dementia, CVA, chronic vent dependent respiratory failure, dysphagia s/p PEG, recurrent UTI/VAP presents with septic shock from [facility] facility. Source most likely urine, r/o PNA as well.     Neuro  - Baseline dementia from previous notes, unable to follow commands, but awake and alert.   - continue Fentanyl patch    CV  -Hypotension 2/2 sepsis now resolving  -Episode of hypotension O/N requiring short course of levo, likely 2/2 ativan for rigors, tachypnea, agitation, afebrile at the time.     Pulm  r/o PNA  - Hx of VAP with Pseudomonal resistant to Zosyn but sensitive to Meropenem   - CT chest 6/15 shows Nonspecific patchy airspace opacities in the lower lobes which can be on an infectious basis or possibly related to aspiration. Residual trace left pleural effusion  - Tolerating vent settings well  - Home dose inhalers   - Sputum cultures shows normal alejandro, but rare gram neg rods.  - C/w Meropenem     GI  - continue PEG tube feeds  - continue bowel regimen senna and miralax, and Dulcolax to regimen    Renal  RYAN resolved  - Likely 2/2 hypotension in the setting of sepsis  - Trend renal indices    ID  Septic shock, h/o proteus mirabilis indol neg in urine and pseudomonas resistant to Zosyn  - MRSA negative   - BCx 6/15 NGTD, rpt 6/17 NGTD  - UCx Candida albicans  - Sputum cultures shows normal alejandro, but rare gram neg rods.  - Continue Meropenem. Added Caspo 6/17  - Fungitell negative but will keep caspo for short course (5 day) as patient appears to be clinically approving s/p initiation (downtrending wbc, afebrile for 24+ hours)    Heme  - DVT ppx, continue lovenox    Endo  Type 2 DM  - corrective scale  - continue Lantus 20qhs     Dispo: Remains full code. Spoke to  at bedside 6/20, updated on patient status, and answered all questions 74F PMH Frontotemporal dementia, CVA, chronic vent dependent respiratory failure, dysphagia s/p PEG, recurrent UTI/VAP presents with septic shock from [facility] facility. Source most likely urine, r/o PNA as well.     Neuro  - Baseline dementia from previous notes, unable to follow commands, but awake and alert.   - continue Fentanyl patch    CV  -Hypotension 2/2 sepsis now resolved  -Episode of hypotension O/N requiring short course of levo, likely 2/2 ativan for rigors, tachypnea, agitation, afebrile at the time.  -now off levophed ggt  -will wean Midodrine to 5mg TID     Pulm  r/o PNA  - Hx of VAP with Pseudomonal resistant to Zosyn but sensitive to Meropenem   - CT chest 6/15 shows Nonspecific patchy airspace opacities in the lower lobes which can be on an infectious basis or possibly related to aspiration. Residual trace left pleural effusion  - Tolerating vent settings well  - Home dose inhalers   - Sputum cultures shows normal alejandro, but rare gram neg rods.  - C/w Meropenem, per ID (6/15- )    GI  - continue PEG tube feeds  - continue bowel regimen senna and miralax, and Dulcolax to regimen    Renal  RYAN resolved  - Likely 2/2 hypotension in the setting of sepsis  - Trend renal indices    ID  Septic shock, h/o proteus mirabilis indol neg in urine and pseudomonas resistant to Zosyn  - MRSA negative   - BCx 6/15 NGTD, rpt 6/17 NGTD  - UCx Candida albicans  - Sputum cultures shows normal alejandro, but rare gram neg rods.  - Continue Meropenem. Added Caspo 6/17  - Fungitell negative but will keep Caspofungin 7 day course, last dose on 6/23    Heme  - DVT ppx, continue lovenox    Endo  Type 2 DM  - corrective scale  - continue Lantus 20qhs     Dispo: Remains full code  Transfer to Floors  Discuss with SW regarding dispo back to [facility] vs different facility

## 2021-06-21 NOTE — CHART NOTE - NSCHARTNOTEFT_GEN_A_CORE
Assessment:   Assessment:  Pt seen for nutrition follow up.  Chart reviewed, hospital course noted.  74F PMH Frontotemporal dementia, CVA, chronic vent dependent respiratory failure, dysphagia s/p PEG, recurrent UTI/VAP presents with septic shock from [facility] facility. Source most likely urine, r/o PNA as well.    Pt remains in ICU, tolerating PEG feeds at goal rate. BM yesterday. St 1 pressure injuries noted. Phos low today, 1.7(supplemented).   TF provides 1800 cals, 83 g pro/day.      Factors impacting intake: [ ] none [ ] nausea  [ ] vomiting [ ] diarrhea [ ] constipation  [ ]chewing problems [ x] swallowing issues  [ ] other:     Diet Presciption: Diet, NPO with Tube Feed:   Tube Feeding Modality: Gastrostomy  Glucerna 1.5  Total Volume for 24 Hours (mL): 1000  Continuous  Starting Tube Feed Rate {mL per Hour}: 30  Increase Tube Feed Rate by (mL): 10     Every 6 hours  Until Goal Tube Feed Rate (mL per Hour): 50  Tube Feed Duration (in Hours): 20  Tube Feed Start Time: 12:00 (06-15-21 @ 11:24)    Intake: Pump study last 24 hrs: 893 of prescribed 1200ml given(-450 calories), 48 hr study: 1861 ml of prescribed  2400ml provided(-808 cals). Per RN, no report of Tf being held, but of note pt with low BP yesterday, Tf may have been stopped temporarily.    Current Weight: Weight (kg): 53 (06-15 @ 01:21)  % Weight Change    Pertinent Medications: MEDICATIONS  (STANDING):  ALBUTerol    90 MICROgram(s) HFA Inhaler 2 Puff(s) Inhalation every 6 hours  aspirin  chewable 81 milliGRAM(s) Oral daily  bisacodyl 5 milliGRAM(s) Oral at bedtime  caspofungin IVPB      caspofungin IVPB 50 milliGRAM(s) IV Intermittent every 24 hours  chlorhexidine 2% Cloths 1 Application(s) Topical <User Schedule>  dextrose 40% Gel 15 Gram(s) Oral once  dextrose 5%. 1000 milliLiter(s) (50 mL/Hr) IV Continuous <Continuous>  dextrose 5%. 1000 milliLiter(s) (100 mL/Hr) IV Continuous <Continuous>  dextrose 50% Injectable 25 Gram(s) IV Push once  dextrose 50% Injectable 12.5 Gram(s) IV Push once  dextrose 50% Injectable 25 Gram(s) IV Push once  enoxaparin Injectable 40 milliGRAM(s) SubCutaneous daily  fentaNYL   Patch  12 MICROgram(s)/Hr 1 Patch Transdermal every 72 hours  glucagon  Injectable 1 milliGRAM(s) IntraMuscular once  insulin glargine Injectable (LANTUS) 20 Unit(s) SubCutaneous at bedtime  insulin lispro (ADMELOG) corrective regimen sliding scale   SubCutaneous every 6 hours  ipratropium 17 MICROgram(s) HFA Inhaler 1 Puff(s) Inhalation every 6 hours  meropenem  IVPB 1000 milliGRAM(s) IV Intermittent every 8 hours  midodrine. 10 milliGRAM(s) Oral three times a day  norepinephrine Infusion 0.05 MICROgram(s)/kG/Min (4.97 mL/Hr) IV Continuous <Continuous>  pantoprazole   Suspension 40 milliGRAM(s) Oral before breakfast  polyethylene glycol 3350 17 Gram(s) Oral every 12 hours  senna 2 Tablet(s) Oral at bedtime  sucralfate suspension 1 Gram(s) Oral every 6 hours    MEDICATIONS  (PRN):  acetaminophen    Suspension .. 650 milliGRAM(s) Oral every 6 hours PRN Temp greater or equal to 38C (100.4F)    Pertinent Labs: 06-21 Na142 mmol/L Glu 118 mg/dL<H> K+ 4.4 mmol/L Cr  0.78 mg/dL BUN 23 mg/dL 06-21 Phos 1.7 mg/dL<L> 06-21 Alb 2.6 g/dL<L>     CAPILLARY BLOOD GLUCOSE      POCT Blood Glucose.: 103 mg/dL (21 Jun 2021 05:50)  POCT Blood Glucose.: 118 mg/dL (20 Jun 2021 23:22)  POCT Blood Glucose.: 115 mg/dL (20 Jun 2021 17:23)  POCT Blood Glucose.: 161 mg/dL (20 Jun 2021 11:45)    Skin: st 1 pressure injuries noted    Estimated Needs:   [x ] no change since previous assessment  [ ] recalculated:     Previous Nutrition Diagnosis: [x] swallowing difficulty    Nutrition Diagnosis is [ x] ongoing - addressed with PEG feeds    New Nutrition Diagnosis: [x ] not applicable       Interventions: Continue TF  Recommend  [ ] Change Diet To:  [ ] Nutrition Supplement  [ ] Nutrition Support  [x ] Other:  consider mvi daily    Monitoring and Evaluation:   [ ] PO intake [ x ] Tolerance to diet prescription [ x ] weights [ x ] labs[ x ] follow up per protocol  [ ] other:

## 2021-06-21 NOTE — PROGRESS NOTE ADULT - SUBJECTIVE AND OBJECTIVE BOX
BREA BECKHAM    hospitals ICU1 12A    Patient is a 75y old  Female who presents with a chief complaint of fever and dyspnea (21 Jun 2021 09:12)       Allergies    codeine (Hives)    Intolerances        HPI:  76 yo Female ,[facility], [facility] resident with hx of  PMH Frontotemporal dementia, CVA, chronic vent dependent respiratory failure, dysphagia s/p PEG, recurrent UTI/VAP presents with septic shock from [facility] facility. She was last admitted at hospitals 10/ 2020 with proteus UTI ,sepsis ,hypernatremia and RYAN .Pseudomonas grew in sputum resistant to zosyn last time Admitted for septic workup and evaluation,send blood and urine cx,serial lactate levels,monitor vitals closley,ivfs hydration,monitor urine output and renal profile,iv abx initiated e. On arrival to ER found to be febrile to 102, WBC 20k, lactate 6.7. CXR with haziness of entire right side, urine appears cloudy. Med hx is significant for Advanced dementia ,s/p  Aphasic stroke , Constipation  ,COVID-19   ,CVA (cerebral vascular accident)  ,Dementia of frontal lobe type  ,Diabetes mellitus  ,DM (diabetes mellitus)type  2   ,GERD (gastroesophageal reflux disease)  ,HTN (hypertension)  ,Hypertension  ,Pneumonia  ,Quadriplegia  ,Respiratory failure ,s/p tracheostomy and peg Palliative care consult requested ,to discuss advance directives and complete /update  MOLST  (15 Zay 2021 07:06)      PAST MEDICAL & SURGICAL HISTORY:  Dementia of frontal lobe type    Aphasic stroke    Diabetes mellitus    Respiratory failure    Hypertension    GERD (gastroesophageal reflux disease)    Constipation    Respiratory failure    CVA (cerebral vascular accident)    HTN (hypertension)    DM (diabetes mellitus)    Advanced dementia    COVID-19 virus detected    Quadriplegia    Pneumonia    Type II diabetes mellitus    Hx of appendectomy    Gastrostomy in place    Tracheostomy in place    Tracheostomy tube present    Feeding by G-tube        FAMILY HISTORY:  No pertinent family history in first degree relatives          MEDICATIONS   acetaminophen    Suspension .. 650 milliGRAM(s) Oral every 6 hours PRN  ALBUTerol    90 MICROgram(s) HFA Inhaler 2 Puff(s) Inhalation every 6 hours  aspirin  chewable 81 milliGRAM(s) Oral daily  bisacodyl 5 milliGRAM(s) Oral at bedtime  caspofungin IVPB      caspofungin IVPB 50 milliGRAM(s) IV Intermittent every 24 hours  chlorhexidine 2% Cloths 1 Application(s) Topical <User Schedule>  dextrose 40% Gel 15 Gram(s) Oral once  dextrose 5%. 1000 milliLiter(s) IV Continuous <Continuous>  dextrose 5%. 1000 milliLiter(s) IV Continuous <Continuous>  dextrose 50% Injectable 25 Gram(s) IV Push once  dextrose 50% Injectable 12.5 Gram(s) IV Push once  dextrose 50% Injectable 25 Gram(s) IV Push once  enoxaparin Injectable 40 milliGRAM(s) SubCutaneous daily  fentaNYL   Patch  12 MICROgram(s)/Hr 1 Patch Transdermal every 72 hours  glucagon  Injectable 1 milliGRAM(s) IntraMuscular once  insulin glargine Injectable (LANTUS) 20 Unit(s) SubCutaneous at bedtime  insulin lispro (ADMELOG) corrective regimen sliding scale   SubCutaneous every 6 hours  ipratropium 17 MICROgram(s) HFA Inhaler 1 Puff(s) Inhalation every 6 hours  meropenem  IVPB 1000 milliGRAM(s) IV Intermittent every 8 hours  midodrine. 10 milliGRAM(s) Oral three times a day  norepinephrine Infusion 0.05 MICROgram(s)/kG/Min IV Continuous <Continuous>  pantoprazole   Suspension 40 milliGRAM(s) Oral before breakfast  polyethylene glycol 3350 17 Gram(s) Oral every 12 hours  senna 2 Tablet(s) Oral at bedtime  sucralfate suspension 1 Gram(s) Oral every 6 hours      Vital Signs Last 24 Hrs  T(C): 36 (21 Jun 2021 09:00), Max: 37.1 (20 Jun 2021 23:41)  T(F): 96.8 (21 Jun 2021 09:00), Max: 98.8 (20 Jun 2021 23:41)  HR: 66 (21 Jun 2021 09:00) (56 - 134)  BP: 113/57 (21 Jun 2021 09:00) (77/41 - 178/104)  BP(mean): 81 (21 Jun 2021 09:00) (53 - 136)  RR: 18 (21 Jun 2021 09:00) (11 - 51)  SpO2: 99% (21 Jun 2021 09:00) (97% - 100%)      06-20-21 @ 07:01  -  06-21-21 @ 07:00  --------------------------------------------------------  IN: 2990 mL / OUT: 950 mL / NET: 2040 mL        Mode: AC/ CMV (Assist Control/ Continuous Mandatory Ventilation), RR (machine): 14, TV (machine): 400, FiO2: 25, PEEP: 5, ITime: 1, MAP: 13, PIP: 35    LABS:                        10.2   5.84  )-----------( 277      ( 21 Jun 2021 05:51 )             32.3     06-21    142  |  110<H>  |  23  ----------------------------<  118<H>  4.4   |  25  |  0.78    Ca    8.7      21 Jun 2021 05:51  Phos  1.7     06-21  Mg     2.6     06-21    TPro  6.5  /  Alb  2.6<L>  /  TBili  0.4  /  DBili  x   /  AST  68<H>  /  ALT  86<H>  /  AlkPhos  102  06-21              WBC:  WBC Count: 5.84 K/uL (06-21 @ 05:51)  WBC Count: 5.38 K/uL (06-20 @ 05:34)  WBC Count: 9.62 K/uL (06-19 @ 09:27)  WBC Count: 8.10 K/uL (06-18 @ 05:30)      MICROBIOLOGY:  RECENT CULTURES:  06-18 .Blood Blood-Peripheral XXXX XXXX   No growth to date.    06-15 .Urine Catheterized XXXX XXXX   50,000 - 99,000 CFU/mL Presumptive Candida albicans  "Susceptibilities not performed"    06-15 .Sputum Sputum XXXX   Few polymorphonuclear leukocytes per low power field  Few Squamous epithelial cells per low power field  Rare Gram Negative Rods per oil power field   Normal Respiratory Elvira present    06-15 .Blood Blood-Peripheral XXXX XXXX   No Growth Final                    Sodium:  Sodium, Serum: 142 mmol/L (06-21 @ 05:51)  Sodium, Serum: 143 mmol/L (06-20 @ 05:34)  Sodium, Serum: 143 mmol/L (06-19 @ 09:27)  Sodium, Serum: 143 mmol/L (06-18 @ 05:30)      0.78 mg/dL 06-21 @ 05:51  0.87 mg/dL 06-20 @ 05:34  1.10 mg/dL 06-19 @ 09:27  0.83 mg/dL 06-18 @ 05:30      Hemoglobin:  Hemoglobin: 10.2 g/dL (06-21 @ 05:51)  Hemoglobin: 9.8 g/dL (06-20 @ 05:34)  Hemoglobin: 10.7 g/dL (06-19 @ 09:27)  Hemoglobin: 10.5 g/dL (06-18 @ 05:30)      Platelets: Platelet Count - Automated: 277 K/uL (06-21 @ 05:51)  Platelet Count - Automated: 261 K/uL (06-20 @ 05:34)  Platelet Count - Automated: 261 K/uL (06-19 @ 09:27)  Platelet Count - Automated: 228 K/uL (06-18 @ 05:30)      LIVER FUNCTIONS - ( 21 Jun 2021 05:51 )  Alb: 2.6 g/dL / Pro: 6.5 g/dL / ALK PHOS: 102 U/L / ALT: 86 U/L / AST: 68 U/L / GGT: x                 RADIOLOGY & ADDITIONAL STUDIES:

## 2021-06-21 NOTE — PROGRESS NOTE ADULT - ASSESSMENT
76 yo Female ,[facility] resident with hx of  PMH Frontotemporal dementia, CVA, chronic vent dependent respiratory failure, dysphagia s/p PEG, recurrent UTI/VAP presents with septic shock from [facility] facility. She was last admitted at [facility] 10/ 2020 with proteus UTI ,sepsis ,hypernatremia and RYAN .Pseudomonas grew in sputum resistant to zosyn last time Admitted for septic workup and evaluation,send blood and urine cx,serial lactate levels,monitor vitals closley,ivfs hydration,monitor urine output and renal profile,iv abx initiated e. On arrival to ER found to be febrile to 102, WBC 20k, lactate 6.7. CXR with haziness of entire right side, urine appears cloudy. Med hx is significant for Advanced dementia ,s/p  Aphasic stroke , Constipation  ,COVID-19   ,CVA (cerebral vascular accident)  ,Dementia of frontal lobe type  ,Diabetes mellitus  ,DM (diabetes mellitus)type  2   ,GERD (gastroesophageal reflux disease)  ,HTN (hypertension)  ,Hypertension  ,Pneumonia  ,Quadriplegia  ,Respiratory failure ,s/p tracheostomy and peg Palliative care consult requested ,to discuss advance directives and complete /update  MOLST  (15 Zay 2021 07:06)      ACUTE RENAL FAILURE: hypernatremia increase water intake   Serum creatinine is improved  0.83    fentaNYL   Patch  12 MICROgram(s)/Hr 1 Patch Transdermal every 72 hours  midodrine 5 milliGRAM(s) Oral every 8 hours  norepinephrine Infusion 0.05 MICROgram(s)/kG/Min (4.97 mL/Hr) IV    Admit for septic workup and ID evaluation,send blood and urine cx,serial lactate levels,monitor vitals closley,ivfs hydration,monitor urine output and renal profile  meropenem  IVPB 1000 milliGRAM(s) IV Intermittent every 12 hours  enoxaparin Injectable 40 milliGRAM(s) SubCutaneous daily

## 2021-06-21 NOTE — PROGRESS NOTE ADULT - ASSESSMENT
Pt is a 75W from [facility], w/ PMHx of frontotemporal dementia, CVA, CHRF requiring vent, dysphagia s/p PEG, recurrent UTI/VAP p/w septic shock 2/2 UTI vs PNA.    RECOMMENDATIONS  Septic Shock 2/2 recurrent VAP vs recurrent UTI  Candiduria  Shock/Hypotension--off pressors now  ARF--resolved  Leukocytosis--resolved  Prior micro with Pseudomonas aeruginosa -  Piperacillin/Tazobactam: R >64 in sputum, Proteus mirabilis (Indole negative) in urine  6/15 RCx NOF  6/15 UCx +candida albicans - ?colonization vs UTI  6/18 BCx NGTD  MRSA screen negative  Fungitell negative   Trend temps/CBC  Pulmonary toilet  Appreciate ICU care  -c/w meropenem (6/15-)  -c/w caspofungin (added on 6/17) - can plan to complete 7 day course for possible UTI until 6/23    Infectious Diseases will continue to follow. Please call with any questions.   Andressa Brantley M.D.  [facility], Division of Infectious Diseases [phone]  For over the weekend and after hours, please call [phone]

## 2021-06-21 NOTE — PROGRESS NOTE ADULT - SUBJECTIVE AND OBJECTIVE BOX
[facility]       HPI:  74 yo Female ,Formerly [facility] resident with hx of  PMH Frontotemporal dementia, CVA, chronic vent dependent respiratory failure, dysphagia s/p PEG, recurrent UTI/VAP presents with septic shock from [facility] facility. She was last admitted at [facility] 10/ 2020 with proteus UTI ,sepsis ,hypernatremia and RYAN .Pseudomonas grew in sputum resistant to zosyn last time Admitted for septic workup and evaluation,send blood and urine cx,serial lactate levels,monitor vitals closley,ivfs hydration,monitor urine output and renal profile,iv abx initiated e. On arrival to ER found to be febrile to 102, WBC 20k, lactate 6.7. CXR with haziness of entire right side, urine appears cloudy. Med hx is significant for Advanced dementia ,s/p  Aphasic stroke , Constipation  ,COVID-19   ,CVA (cerebral vascular accident)  ,Dementia of frontal lobe type  ,Diabetes mellitus  ,DM (diabetes mellitus)type  2   ,GERD (gastroesophageal reflux disease)  ,HTN (hypertension)  ,Hypertension  ,Pneumonia  ,Quadriplegia  ,Respiratory failure ,s/p tracheostomy and peg Palliative care consult requested ,to discuss advance directives and complete /update  MOLST  (15 Zay 2021 07:06)        SUBJECTIVE:      ALLERGIES:  Allergies    codeine (Hives)    Intolerances          MEDICATIONS  (STANDING):  ALBUTerol    90 MICROgram(s) HFA Inhaler 2 Puff(s) Inhalation every 6 hours  aspirin  chewable 81 milliGRAM(s) Oral daily  bisacodyl 5 milliGRAM(s) Oral at bedtime  caspofungin IVPB      caspofungin IVPB 50 milliGRAM(s) IV Intermittent every 24 hours  chlorhexidine 2% Cloths 1 Application(s) Topical <User Schedule>  dextrose 40% Gel 15 Gram(s) Oral once  dextrose 5%. 1000 milliLiter(s) (50 mL/Hr) IV Continuous <Continuous>  dextrose 5%. 1000 milliLiter(s) (100 mL/Hr) IV Continuous <Continuous>  dextrose 50% Injectable 25 Gram(s) IV Push once  dextrose 50% Injectable 12.5 Gram(s) IV Push once  dextrose 50% Injectable 25 Gram(s) IV Push once  enoxaparin Injectable 40 milliGRAM(s) SubCutaneous daily  fentaNYL   Patch  12 MICROgram(s)/Hr 1 Patch Transdermal every 72 hours  glucagon  Injectable 1 milliGRAM(s) IntraMuscular once  insulin glargine Injectable (LANTUS) 20 Unit(s) SubCutaneous at bedtime  insulin lispro (ADMELOG) corrective regimen sliding scale   SubCutaneous every 6 hours  ipratropium 17 MICROgram(s) HFA Inhaler 1 Puff(s) Inhalation every 6 hours  meropenem  IVPB 1000 milliGRAM(s) IV Intermittent every 8 hours  midodrine. 10 milliGRAM(s) Oral three times a day  norepinephrine Infusion 0.05 MICROgram(s)/kG/Min (4.97 mL/Hr) IV Continuous <Continuous>  pantoprazole   Suspension 40 milliGRAM(s) Oral before breakfast  polyethylene glycol 3350 17 Gram(s) Oral every 12 hours  senna 2 Tablet(s) Oral at bedtime  sucralfate suspension 1 Gram(s) Oral every 6 hours    MEDICATIONS  (PRN):  acetaminophen    Suspension .. 650 milliGRAM(s) Oral every 6 hours PRN Temp greater or equal to 38C (100.4F)      REVIEW OF SYSTEMS:  CONSTITUTIONAL: No fever,  RESPIRATORY: No cough, wheezing, shortness of breath  CARDIOVASCULAR: No chest pain, dyspnea, palpitations, dizziness, syncope, paroxysmal nocturnal dyspnea, orthopnea, or arm or leg swelling  GASTROINTESTINAL: No abdominal  or epigastric pain, nausea, vomiting,  diarrhea  NEUROLOGICAL: No headaches,  loss of strength, numbness, or tremors    Vital Signs Last 24 Hrs  T(C): 36.7 (21 Jun 2021 04:16), Max: 37.1 (20 Jun 2021 23:41)  T(F): 98.1 (21 Jun 2021 04:16), Max: 98.8 (20 Jun 2021 23:41)  HR: 74 (21 Jun 2021 07:00) (56 - 134)  BP: 155/68 (21 Jun 2021 07:00) (77/41 - 178/104)  BP(mean): 98 (21 Jun 2021 07:00) (53 - 136)  RR: 23 (21 Jun 2021 07:00) (11 - 51)  SpO2: 99% (21 Jun 2021 07:00) (97% - 100%)    PHYSICAL EXAM:  HEAD:  Atraumatic, Normocephalic  NECK: Supple and normal thyroid.  No JVD or carotid bruit.   HEART: S1, S2 regular , 1/6 soft ejection systolic murmur in mitral area , no thrill and no gallops .  PULMONARY: Bilateral vesicular breathing , few scattered ronchi and few scattered rales are present .  ABDOMEN: Soft nontender and positive bowl sounds   EXTREMITIES:  No clubbing, cyanosis, or pedal  edema  NEUROLOGICAL: AAOX3 , no focal deficit .    LABS:                        10.2   5.84  )-----------( 277      ( 21 Jun 2021 05:51 )             32.3     06-21    142  |  110<H>  |  23  ----------------------------<  118<H>  4.4   |  25  |  0.78    Ca    8.7      21 Jun 2021 05:51  Phos  1.7     06-21  Mg     2.6     06-21    TPro  6.5  /  Alb  2.6<L>  /  TBili  0.4  /  DBili  x   /  AST  68<H>  /  ALT  86<H>  /  AlkPhos  102  06-21            BNP      EKG:  ECHO:  IMAGING:    Assessment/Plan    Will continue to follow during hospital course and give further recommendations as needed . Thanks for your referral . if any questions please contact me at office ([phone])cell [phone])  JOSIE SON MD [place] [address]  [address]  OFFICE : [phone]  CELL : [phone]    CARDIOLOGY F/U :      HPI:  74 yo Female ,[facility] resident with hx of  PMH Frontotemporal dementia, CVA, chronic vent dependent respiratory failure, dysphagia s/p PEG, recurrent UTI/VAP presents with septic shock from [facility] facility. She was last admitted at [facility] 10/ 2020 with proteus UTI ,sepsis ,hypernatremia and RYAN .Pseudomonas grew in sputum resistant to zosyn last time Admitted for septic workup and evaluation,send blood and urine cx,serial lactate levels,monitor vitals closley,ivfs hydration,monitor urine output and renal profile,iv abx initiated e. On arrival to ER found to be febrile to 102, WBC 20k, lactate 6.7. CXR with haziness of entire right side, urine appears cloudy. Med hx is significant for Advanced dementia ,s/p  Aphasic stroke , Constipation  ,COVID-19   ,CVA (cerebral vascular accident)  ,Dementia of frontal lobe type  ,Diabetes mellitus  ,DM (diabetes mellitus)type  2   ,GERD (gastroesophageal reflux disease)  ,HTN (hypertension)  ,Hypertension  ,Pneumonia  ,Quadriplegia  ,Respiratory failure ,s/p tracheostomy and peg Palliative care consult requested ,to discuss advance directives and complete /update  MOL  (15 Zay 2021 07:06)        SUBJECTIVE: Patient intubated .      ALLERGIES:  Allergies    codeine (Hives)    Intolerances          MEDICATIONS  (STANDING):  ALBUTerol    90 MICROgram(s) HFA Inhaler 2 Puff(s) Inhalation every 6 hours  aspirin  chewable 81 milliGRAM(s) Oral daily  bisacodyl 5 milliGRAM(s) Oral at bedtime  caspofungin IVPB      caspofungin IVPB 50 milliGRAM(s) IV Intermittent every 24 hours  chlorhexidine 2% Cloths 1 Application(s) Topical <User Schedule>  dextrose 40% Gel 15 Gram(s) Oral once  dextrose 5%. 1000 milliLiter(s) (50 mL/Hr) IV Continuous <Continuous>  dextrose 5%. 1000 milliLiter(s) (100 mL/Hr) IV Continuous <Continuous>  dextrose 50% Injectable 25 Gram(s) IV Push once  dextrose 50% Injectable 12.5 Gram(s) IV Push once  dextrose 50% Injectable 25 Gram(s) IV Push once  enoxaparin Injectable 40 milliGRAM(s) SubCutaneous daily  fentaNYL   Patch  12 MICROgram(s)/Hr 1 Patch Transdermal every 72 hours  glucagon  Injectable 1 milliGRAM(s) IntraMuscular once  insulin glargine Injectable (LANTUS) 20 Unit(s) SubCutaneous at bedtime  insulin lispro (ADMELOG) corrective regimen sliding scale   SubCutaneous every 6 hours  ipratropium 17 MICROgram(s) HFA Inhaler 1 Puff(s) Inhalation every 6 hours  meropenem  IVPB 1000 milliGRAM(s) IV Intermittent every 8 hours  midodrine. 10 milliGRAM(s) Oral three times a day  norepinephrine Infusion 0.05 MICROgram(s)/kG/Min (4.97 mL/Hr) IV Continuous <Continuous>  pantoprazole   Suspension 40 milliGRAM(s) Oral before breakfast  polyethylene glycol 3350 17 Gram(s) Oral every 12 hours  senna 2 Tablet(s) Oral at bedtime  sucralfate suspension 1 Gram(s) Oral every 6 hours    MEDICATIONS  (PRN):  acetaminophen    Suspension .. 650 milliGRAM(s) Oral every 6 hours PRN Temp greater or equal to 38C (100.4F)      REVIEW OF SYSTEMS:  CONSTITUTIONAL: No fever,  RESPIRATORY: Intubated .  CARDIOVASCULAR: No chest pain,  palpitations, dizziness, syncope, paroxysmal nocturnal dyspnea, or arm or leg swelling and patient intubated .  GASTROINTESTINAL: No abdominal  or epigastric pain, nausea, vomiting,  diarrhea  NEUROLOGICAL: No headaches,   numbness, or tremors  Skin : No rashes and itching .  Hematology : No active bleeding .  Psychiatry : Patient is under effect of sedation .  Endocrinology : No heat and cold intolerance .  Musculoskeletal : Patient has mild arthritis .    Vital Signs Last 24 Hrs  T(C): 36.7 (21 Jun 2021 04:16), Max: 37.1 (20 Jun 2021 23:41)  T(F): 98.1 (21 Jun 2021 04:16), Max: 98.8 (20 Jun 2021 23:41)  HR: 74 (21 Jun 2021 07:00) (56 - 134)  BP: 155/68 (21 Jun 2021 07:00) (77/41 - 178/104)  BP(mean): 98 (21 Jun 2021 07:00) (53 - 136)  RR: 23 (21 Jun 2021 07:00) (11 - 51)  SpO2: 99% (21 Jun 2021 07:00) (97% - 100%)    PHYSICAL EXAM:  HEAD:  Atraumatic, Normocephalic  NECK: Supple and normal thyroid.  No JVD or carotid bruit.   HEART: S1, S2 regular , 1/6 soft ejection systolic murmur in mitral area , no thrill and no gallops .  PULMONARY: Bilateral vesicular breathing , few scattered rhonchi and few scattered rales are present .  ABDOMEN: Soft nontender and positive bowl sounds   EXTREMITIES:  No clubbing, cyanosis, or pedal  edema  NEUROLOGICAL: under effect of sedation .  Skin : No rashes .  Musculoskeletal : No joint swellings .    LABS:                        10.2   5.84  )-----------( 277      ( 21 Jun 2021 05:51 )             32.3     06-21    142  |  110<H>  |  23  ----------------------------<  118<H>  4.4   |  25  |  0.78    Ca    8.7      21 Jun 2021 05:51  Phos  1.7     06-21  Mg     2.6     06-21    TPro  6.5  /  Alb  2.6<L>  /  TBili  0.4  /  DBili  x   /  AST  68<H>  /  ALT  86<H>  /  AlkPhos  102  06-21            Assessment/Plan  Patient has :  1) Respiratory failure .  2) Pneumonia R/O sepsis .  3) S/P Hypotension and septic shock and BP better now .  4) H/O CVA   5) Anemia   Plan : 1) I/V antibiotics as per ID 2) Monitor hemoglobin and electrolyte 3) Rest of medications a such 4) Prognosis guarded .  Will continue to follow during hospital course and give further recommendations as needed . Thanks for your referral . if any questions please contact me at office ([phone])

## 2021-06-22 ENCOUNTER — TRANSCRIPTION ENCOUNTER (OUTPATIENT)
Age: 76
End: 2021-06-22

## 2021-06-22 DIAGNOSIS — R25.9 UNSPECIFIED ABNORMAL INVOLUNTARY MOVEMENTS: ICD-10-CM

## 2021-06-22 LAB
ALBUMIN SERPL ELPH-MCNC: 3 G/DL — LOW (ref 3.3–5)
ALP SERPL-CCNC: 110 U/L — SIGNIFICANT CHANGE UP (ref 40–120)
ALT FLD-CCNC: 79 U/L — HIGH (ref 12–78)
ANION GAP SERPL CALC-SCNC: 2 MMOL/L — LOW (ref 5–17)
APTT BLD: 33 SEC — SIGNIFICANT CHANGE UP (ref 27.5–35.5)
AST SERPL-CCNC: 52 U/L — HIGH (ref 15–37)
BASOPHILS # BLD AUTO: 0.04 K/UL — SIGNIFICANT CHANGE UP (ref 0–0.2)
BASOPHILS NFR BLD AUTO: 0.5 % — SIGNIFICANT CHANGE UP (ref 0–2)
BILIRUB SERPL-MCNC: 0.5 MG/DL — SIGNIFICANT CHANGE UP (ref 0.2–1.2)
BUN SERPL-MCNC: 19 MG/DL — SIGNIFICANT CHANGE UP (ref 7–23)
CALCIUM SERPL-MCNC: 9.2 MG/DL — SIGNIFICANT CHANGE UP (ref 8.5–10.1)
CHLORIDE SERPL-SCNC: 109 MMOL/L — HIGH (ref 96–108)
CO2 SERPL-SCNC: 30 MMOL/L — SIGNIFICANT CHANGE UP (ref 22–31)
CREAT SERPL-MCNC: 0.73 MG/DL — SIGNIFICANT CHANGE UP (ref 0.5–1.3)
EOSINOPHIL # BLD AUTO: 0.21 K/UL — SIGNIFICANT CHANGE UP (ref 0–0.5)
EOSINOPHIL NFR BLD AUTO: 2.7 % — SIGNIFICANT CHANGE UP (ref 0–6)
GLUCOSE SERPL-MCNC: 119 MG/DL — HIGH (ref 70–99)
HCT VFR BLD CALC: 36.8 % — SIGNIFICANT CHANGE UP (ref 34.5–45)
HGB BLD-MCNC: 11.5 G/DL — SIGNIFICANT CHANGE UP (ref 11.5–15.5)
IMM GRANULOCYTES NFR BLD AUTO: 1.3 % — SIGNIFICANT CHANGE UP (ref 0–1.5)
INR BLD: 0.96 RATIO — SIGNIFICANT CHANGE UP (ref 0.88–1.16)
LYMPHOCYTES # BLD AUTO: 2.06 K/UL — SIGNIFICANT CHANGE UP (ref 1–3.3)
LYMPHOCYTES # BLD AUTO: 26.8 % — SIGNIFICANT CHANGE UP (ref 13–44)
MAGNESIUM SERPL-MCNC: 2.6 MG/DL — SIGNIFICANT CHANGE UP (ref 1.6–2.6)
MCHC RBC-ENTMCNC: 27.8 PG — SIGNIFICANT CHANGE UP (ref 27–34)
MCHC RBC-ENTMCNC: 31.3 GM/DL — LOW (ref 32–36)
MCV RBC AUTO: 89.1 FL — SIGNIFICANT CHANGE UP (ref 80–100)
MONOCYTES # BLD AUTO: 0.88 K/UL — SIGNIFICANT CHANGE UP (ref 0–0.9)
MONOCYTES NFR BLD AUTO: 11.4 % — SIGNIFICANT CHANGE UP (ref 2–14)
NEUTROPHILS # BLD AUTO: 4.41 K/UL — SIGNIFICANT CHANGE UP (ref 1.8–7.4)
NEUTROPHILS NFR BLD AUTO: 57.3 % — SIGNIFICANT CHANGE UP (ref 43–77)
NRBC # BLD: 0 /100 WBCS — SIGNIFICANT CHANGE UP (ref 0–0)
PHOSPHATE SERPL-MCNC: 2.4 MG/DL — LOW (ref 2.5–4.5)
PLATELET # BLD AUTO: 283 K/UL — SIGNIFICANT CHANGE UP (ref 150–400)
POTASSIUM SERPL-MCNC: 4.3 MMOL/L — SIGNIFICANT CHANGE UP (ref 3.5–5.3)
POTASSIUM SERPL-SCNC: 4.3 MMOL/L — SIGNIFICANT CHANGE UP (ref 3.5–5.3)
PROT SERPL-MCNC: 7.6 G/DL — SIGNIFICANT CHANGE UP (ref 6–8.3)
PROTHROM AB SERPL-ACNC: 11.3 SEC — SIGNIFICANT CHANGE UP (ref 10.6–13.6)
RBC # BLD: 4.13 M/UL — SIGNIFICANT CHANGE UP (ref 3.8–5.2)
RBC # FLD: 15.3 % — HIGH (ref 10.3–14.5)
SARS-COV-2 RNA SPEC QL NAA+PROBE: SIGNIFICANT CHANGE UP
SODIUM SERPL-SCNC: 141 MMOL/L — SIGNIFICANT CHANGE UP (ref 135–145)
WBC # BLD: 7.7 K/UL — SIGNIFICANT CHANGE UP (ref 3.8–10.5)
WBC # FLD AUTO: 7.7 K/UL — SIGNIFICANT CHANGE UP (ref 3.8–10.5)

## 2021-06-22 PROCEDURE — 99233 SBSQ HOSP IP/OBS HIGH 50: CPT | Mod: GC

## 2021-06-22 RX ORDER — POTASSIUM PHOSPHATE, MONOBASIC POTASSIUM PHOSPHATE, DIBASIC 236; 224 MG/ML; MG/ML
15 INJECTION, SOLUTION INTRAVENOUS ONCE
Refills: 0 | Status: COMPLETED | OUTPATIENT
Start: 2021-06-22 | End: 2021-06-22

## 2021-06-22 RX ORDER — SODIUM,POTASSIUM PHOSPHATES 278-250MG
2 POWDER IN PACKET (EA) ORAL DAILY
Refills: 0 | Status: COMPLETED | OUTPATIENT
Start: 2021-06-22 | End: 2021-06-23

## 2021-06-22 RX ORDER — FENTANYL CITRATE 50 UG/ML
1 INJECTION INTRAVENOUS
Refills: 0 | Status: CANCELLED | OUTPATIENT
Start: 2021-06-25 | End: 2021-06-23

## 2021-06-22 RX ADMIN — FENTANYL CITRATE 1 PATCH: 50 INJECTION INTRAVENOUS at 12:14

## 2021-06-22 RX ADMIN — Medication 2 PACKET(S): at 12:16

## 2021-06-22 RX ADMIN — INSULIN GLARGINE 20 UNIT(S): 100 INJECTION, SOLUTION SUBCUTANEOUS at 21:12

## 2021-06-22 RX ADMIN — Medication 5 MILLIGRAM(S): at 21:12

## 2021-06-22 RX ADMIN — ENOXAPARIN SODIUM 40 MILLIGRAM(S): 100 INJECTION SUBCUTANEOUS at 12:15

## 2021-06-22 RX ADMIN — MEROPENEM 100 MILLIGRAM(S): 1 INJECTION INTRAVENOUS at 21:11

## 2021-06-22 RX ADMIN — Medication 1 MILLIGRAM(S): at 21:12

## 2021-06-22 RX ADMIN — Medication 1 PUFF(S): at 14:25

## 2021-06-22 RX ADMIN — POLYETHYLENE GLYCOL 3350 17 GRAM(S): 17 POWDER, FOR SOLUTION ORAL at 05:05

## 2021-06-22 RX ADMIN — Medication 81 MILLIGRAM(S): at 12:16

## 2021-06-22 RX ADMIN — POTASSIUM PHOSPHATE, MONOBASIC POTASSIUM PHOSPHATE, DIBASIC 62.5 MILLIMOLE(S): 236; 224 INJECTION, SOLUTION INTRAVENOUS at 08:42

## 2021-06-22 RX ADMIN — ALBUTEROL 2 PUFF(S): 90 AEROSOL, METERED ORAL at 01:00

## 2021-06-22 RX ADMIN — ALBUTEROL 2 PUFF(S): 90 AEROSOL, METERED ORAL at 08:09

## 2021-06-22 RX ADMIN — POLYETHYLENE GLYCOL 3350 17 GRAM(S): 17 POWDER, FOR SOLUTION ORAL at 17:48

## 2021-06-22 RX ADMIN — ALBUTEROL 2 PUFF(S): 90 AEROSOL, METERED ORAL at 20:41

## 2021-06-22 RX ADMIN — PANTOPRAZOLE SODIUM 40 MILLIGRAM(S): 20 TABLET, DELAYED RELEASE ORAL at 05:05

## 2021-06-22 RX ADMIN — MEROPENEM 100 MILLIGRAM(S): 1 INJECTION INTRAVENOUS at 05:05

## 2021-06-22 RX ADMIN — Medication 1 GRAM(S): at 05:08

## 2021-06-22 RX ADMIN — Medication 1 GRAM(S): at 12:14

## 2021-06-22 RX ADMIN — Medication 1 PUFF(S): at 01:00

## 2021-06-22 RX ADMIN — Medication 2: at 18:46

## 2021-06-22 RX ADMIN — Medication 1 GRAM(S): at 17:48

## 2021-06-22 RX ADMIN — ALBUTEROL 2 PUFF(S): 90 AEROSOL, METERED ORAL at 14:25

## 2021-06-22 RX ADMIN — Medication 1 PUFF(S): at 08:09

## 2021-06-22 RX ADMIN — FENTANYL CITRATE 1 PATCH: 50 INJECTION INTRAVENOUS at 21:02

## 2021-06-22 RX ADMIN — SENNA PLUS 2 TABLET(S): 8.6 TABLET ORAL at 21:12

## 2021-06-22 RX ADMIN — CASPOFUNGIN ACETATE 260 MILLIGRAM(S): 7 INJECTION, POWDER, LYOPHILIZED, FOR SOLUTION INTRAVENOUS at 12:20

## 2021-06-22 RX ADMIN — Medication 1 PUFF(S): at 20:41

## 2021-06-22 RX ADMIN — Medication 1 MILLIGRAM(S): at 05:05

## 2021-06-22 RX ADMIN — FENTANYL CITRATE 1 PATCH: 50 INJECTION INTRAVENOUS at 11:00

## 2021-06-22 RX ADMIN — Medication 2 MILLIGRAM(S): at 17:18

## 2021-06-22 RX ADMIN — Medication 1 MILLIGRAM(S): at 13:49

## 2021-06-22 RX ADMIN — MEROPENEM 100 MILLIGRAM(S): 1 INJECTION INTRAVENOUS at 13:49

## 2021-06-22 RX ADMIN — CHLORHEXIDINE GLUCONATE 1 APPLICATION(S): 213 SOLUTION TOPICAL at 05:05

## 2021-06-22 RX ADMIN — MIDODRINE HYDROCHLORIDE 5 MILLIGRAM(S): 2.5 TABLET ORAL at 05:08

## 2021-06-22 RX ADMIN — FENTANYL CITRATE 1 PATCH: 50 INJECTION INTRAVENOUS at 07:00

## 2021-06-22 NOTE — PROGRESS NOTE ADULT - ASSESSMENT
74F PMH Frontotemporal dementia, CVA, chronic vent dependent respiratory failure, dysphagia s/p PEG, recurrent UTI/VAP presents with septic shock from [facility] facility. Source most likely urine, r/o PNA as well.     Neuro  - Baseline dementia from previous notes, unable to follow commands, but awake and alert.   - continue Fentanyl patch  - started on Ativan 1mg TID standing with no reoccurring episodes of abnormal movements    CV  -Hypotension 2/2 sepsis now resolved  -Episode of hypotension O/N requiring short course of levo, likely 2/2 ativan for rigors, tachypnea, agitation, afebrile at the time.  -now off levophed ggt  -Midodrine dced  -BPs stable in 130s-140s    Pulm  r/o PNA  - Hx of VAP with Pseudomonal resistant to Zosyn but sensitive to Meropenem   - CT chest 6/15 shows Nonspecific patchy airspace opacities in the lower lobes which can be on an infectious basis or possibly related to aspiration. Residual trace left pleural effusion  - Tolerating vent settings well  - Home dose inhalers   - Sputum cultures shows normal alejandro, but rare gram neg rods.  - C/w Meropenem, complete 10 day course last day to be on 6/24    GI  - continue PEG tube feeds  - continue bowel regimen senna and miralax, and Dulcolax to regimen    Renal  RYAN resolved  - Likely 2/2 hypotension in the setting of sepsis  - Trend renal indices    ID  Septic shock, h/o proteus mirabilis indol neg in urine and pseudomonas resistant to Zosyn  - MRSA negative   - BCx 6/15 NGTD, rpt 6/17 NGTD  - UCx Candida albicans  - Sputum cultures shows normal alejandro, but rare gram neg rods.  - Continue Meropenem until 6/24. Added Caspo 6/17  - Fungitell negative but will keep Caspofungin 7 day course, last dose on 6/23    Heme  - DVT ppx, continue lovenox    Endo  Type 2 DM  - corrective scale  - continue Lantus 20qhs     Dispo: Remains full code  Transfer to Floors or DC to long term care facility if bed is available   Discussed with SW   Marciano Daily updated by attending Dr. Valenzuela at bedside

## 2021-06-22 NOTE — PROGRESS NOTE ADULT - SUBJECTIVE AND OBJECTIVE BOX
Patient is a 75y Female whom presented to the hospital with ckd and manasa     PAST MEDICAL & SURGICAL HISTORY:  Dementia of frontal lobe type    Aphasic stroke    Diabetes mellitus    Respiratory failure    Hypertension    GERD (gastroesophageal reflux disease)    Constipation    Respiratory failure    CVA (cerebral vascular accident)    HTN (hypertension)    DM (diabetes mellitus)    Advanced dementia    COVID-19 virus detected    Quadriplegia    Pneumonia    Type II diabetes mellitus    Hx of appendectomy    Gastrostomy in place    Tracheostomy in place    Tracheostomy tube present    Feeding by G-tube        MEDICATIONS  (STANDING):  ALBUTerol    90 MICROgram(s) HFA Inhaler 2 Puff(s) Inhalation every 6 hours  aspirin  chewable 81 milliGRAM(s) Oral daily  chlorhexidine 2% Cloths 1 Application(s) Topical <User Schedule>  dextrose 40% Gel 15 Gram(s) Oral once  dextrose 5%. 1000 milliLiter(s) (50 mL/Hr) IV Continuous <Continuous>  dextrose 5%. 1000 milliLiter(s) (100 mL/Hr) IV Continuous <Continuous>  dextrose 50% Injectable 25 Gram(s) IV Push once  dextrose 50% Injectable 12.5 Gram(s) IV Push once  dextrose 50% Injectable 25 Gram(s) IV Push once  glucagon  Injectable 1 milliGRAM(s) IntraMuscular once  heparin   Injectable 5000 Unit(s) SubCutaneous every 8 hours  insulin glargine Injectable (LANTUS) 20 Unit(s) SubCutaneous at bedtime  insulin lispro (ADMELOG) corrective regimen sliding scale   SubCutaneous every 6 hours  ipratropium 17 MICROgram(s) HFA Inhaler 1 Puff(s) Inhalation every 6 hours  meropenem  IVPB      meropenem  IVPB 1000 milliGRAM(s) IV Intermittent every 12 hours  pantoprazole   Suspension 40 milliGRAM(s) Oral before breakfast  polyethylene glycol 3350 17 Gram(s) Oral daily  senna 2 Tablet(s) Oral at bedtime      Allergies    codeine (Hives)    Intolerances        SOCIAL HISTORY:  Denies ETOh,Smoking,     FAMILY HISTORY:  No pertinent family history in first degree relatives        REVIEW OF SYSTEMS:    unable to obtained a good review system                                                                                                         11.5   7.70  )-----------( 283      ( 22 Jun 2021 05:29 )             36.8       CBC Full  -  ( 22 Jun 2021 05:29 )  WBC Count : 7.70 K/uL  RBC Count : 4.13 M/uL  Hemoglobin : 11.5 g/dL  Hematocrit : 36.8 %  Platelet Count - Automated : 283 K/uL  Mean Cell Volume : 89.1 fl  Mean Cell Hemoglobin : 27.8 pg  Mean Cell Hemoglobin Concentration : 31.3 gm/dL  Auto Neutrophil # : 4.41 K/uL  Auto Lymphocyte # : 2.06 K/uL  Auto Monocyte # : 0.88 K/uL  Auto Eosinophil # : 0.21 K/uL  Auto Basophil # : 0.04 K/uL  Auto Neutrophil % : 57.3 %  Auto Lymphocyte % : 26.8 %  Auto Monocyte % : 11.4 %  Auto Eosinophil % : 2.7 %  Auto Basophil % : 0.5 %      06-22    141  |  109<H>  |  19  ----------------------------<  119<H>  4.3   |  30  |  0.73    Ca    9.2      22 Jun 2021 05:29  Phos  2.4     06-22  Mg     2.6     06-22    TPro  7.6  /  Alb  3.0<L>  /  TBili  0.5  /  DBili  x   /  AST  52<H>  /  ALT  79<H>  /  AlkPhos  110  06-22      CAPILLARY BLOOD GLUCOSE      POCT Blood Glucose.: 129 mg/dL (22 Jun 2021 12:12)  POCT Blood Glucose.: 108 mg/dL (22 Jun 2021 05:21)  POCT Blood Glucose.: 106 mg/dL (21 Jun 2021 23:51)  POCT Blood Glucose.: 106 mg/dL (21 Jun 2021 18:57)      Vital Signs Last 24 Hrs  T(C): 37.1 (22 Jun 2021 15:30), Max: 37.1 (22 Jun 2021 15:30)  T(F): 98.8 (22 Jun 2021 15:30), Max: 98.8 (22 Jun 2021 15:30)  HR: 77 (22 Jun 2021 16:49) (56 - 91)  BP: 132/63 (22 Jun 2021 15:00) (100/52 - 163/73)  BP(mean): 90 (22 Jun 2021 15:00) (71 - 105)  RR: 21 (22 Jun 2021 15:00) (6 - 33)  SpO2: 99% (22 Jun 2021 16:49) (88% - 100%)        PT/INR - ( 22 Jun 2021 05:29 )   PT: 11.3 sec;   INR: 0.96 ratio         PTT - ( 22 Jun 2021 05:29 )  PTT:33.0 sec                      PHYSICAL EXAM:    Constitutional: NAD  Neck:  No JVD  Respiratory: decrease bs b/l , pos trach   Cardiovascular: S1 and S2  Gastrointestinal: BS+, soft, pos peg   Extremities: No peripheral edema

## 2021-06-22 NOTE — PROGRESS NOTE ADULT - SUBJECTIVE AND OBJECTIVE BOX
PROGRESS NOTE  Patient is a 75y old  Female who presents with a chief complaint of fever and dyspnea (22 Jun 2021 13:38)  Chart and available morning labs /imaging are reviewed electronically , urgent issues addressed . More information  is being added upon completion of rounds , when more information is collected and management discussed with consultants , medical staff and social service/case management on the floor   OVERNIGHT  Started on RTC Ativan without repeat evidence of abnormal movements. Pt is stable for floor transfer or if bed available at long term care facility, continued  on iv antifungals and Meropenem   HPI:  74 yo Female ,[facility] resident with hx of  PMH Frontotemporal dementia, CVA, chronic vent dependent respiratory failure, dysphagia s/p PEG, recurrent UTI/VAP presents with septic shock from [facility] facility. She was [facility] with proteus UTI ,sepsis ,hypernatremia and RYAN .Pseudomonas grew in sputum resistant to zosyn last time Admitted for septic workup and evaluation,send blood and urine cx,serial lactate levels,monitor vitals closley,ivfs hydration,monitor urine output and renal profile,iv abx initiated e. On arrival to ER found to be febrile to 102, WBC 20k, lactate 6.7. CXR with haziness of entire right side, urine appears cloudy. Med hx is significant for Advanced dementia ,s/p  Aphasic stroke , Constipation  ,COVID-19   ,CVA (cerebral vascular accident)  ,Dementia of frontal lobe type  ,Diabetes mellitus  ,DM (diabetes mellitus)type  2   ,GERD (gastroesophageal reflux disease)  ,HTN (hypertension)  ,Hypertension  ,Pneumonia  ,Quadriplegia  ,Respiratory failure ,s/p tracheostomy and peg Palliative care consult requested ,to discuss advance directives and complete /update  MOLST  (15 Zay 2021 07:06)    PAST MEDICAL & SURGICAL HISTORY:  Dementia of frontal lobe type    Aphasic stroke    Diabetes mellitus    Respiratory failure    Hypertension    GERD (gastroesophageal reflux disease)    Constipation    Respiratory failure    CVA (cerebral vascular accident)    HTN (hypertension)    DM (diabetes mellitus)    Advanced dementia    COVID-19 virus detected    Quadriplegia    Pneumonia    Type II diabetes mellitus    Hx of appendectomy    Gastrostomy in place    Tracheostomy in place    Tracheostomy tube present    Feeding by G-tube        MEDICATIONS  (STANDING):  ALBUTerol    90 MICROgram(s) HFA Inhaler 2 Puff(s) Inhalation every 6 hours  aspirin  chewable 81 milliGRAM(s) Oral daily  bisacodyl 5 milliGRAM(s) Oral at bedtime  caspofungin IVPB      caspofungin IVPB 50 milliGRAM(s) IV Intermittent every 24 hours  chlorhexidine 2% Cloths 1 Application(s) Topical <User Schedule>  dextrose 40% Gel 15 Gram(s) Oral once  dextrose 5%. 1000 milliLiter(s) (50 mL/Hr) IV Continuous <Continuous>  dextrose 5%. 1000 milliLiter(s) (100 mL/Hr) IV Continuous <Continuous>  dextrose 50% Injectable 25 Gram(s) IV Push once  dextrose 50% Injectable 12.5 Gram(s) IV Push once  dextrose 50% Injectable 25 Gram(s) IV Push once  enoxaparin Injectable 40 milliGRAM(s) SubCutaneous daily  glucagon  Injectable 1 milliGRAM(s) IntraMuscular once  insulin glargine Injectable (LANTUS) 20 Unit(s) SubCutaneous at bedtime  insulin lispro (ADMELOG) corrective regimen sliding scale   SubCutaneous every 6 hours  ipratropium 17 MICROgram(s) HFA Inhaler 1 Puff(s) Inhalation every 6 hours  LORazepam     Tablet 1 milliGRAM(s) Oral every 8 hours  meropenem  IVPB 1000 milliGRAM(s) IV Intermittent every 8 hours  norepinephrine Infusion 0.05 MICROgram(s)/kG/Min (4.97 mL/Hr) IV Continuous <Continuous>  pantoprazole   Suspension 40 milliGRAM(s) Oral before breakfast  polyethylene glycol 3350 17 Gram(s) Oral every 12 hours  potassium phosphate / sodium phosphate Powder (PHOS-NaK) 2 Packet(s) Oral daily  senna 2 Tablet(s) Oral at bedtime  sucralfate suspension 1 Gram(s) Oral every 6 hours    MEDICATIONS  (PRN):  acetaminophen    Suspension .. 650 milliGRAM(s) Oral every 6 hours PRN Temp greater or equal to 38C (100.4F)      OBJECTIVE    T(C): 37 (06-22-21 @ 12:26), Max: 37.3 (06-21-21 @ 15:16)  HR: 83 (06-22-21 @ 12:00) (56 - 86)  BP: 118/56 (06-22-21 @ 12:00) (90/44 - 163/73)  RR: 25 (06-22-21 @ 12:00) (6 - 27)  SpO2: 95% (06-22-21 @ 12:00) (88% - 100%)  Wt(kg): --  I&O's Summary  21 Jun 2021 07:01  -  22 Jun 2021 07:00  --------------------------------------------------------  IN: 3170 mL / OUT: 900 mL / NET: 2270 mL  REVIEW OF SYSTEMS:  Patient is  unable to provide any information/ROS  due to baseline mental status.   PHYSICAL EXAM: trach   Appearance: NAD. VS past 24 hrs -as above   HEENT:   Moist oral mucosa. Conjunctiva clear b/l.   Neck : supple  Respiratory: Lungs CTAB.  Gastrointestinal:  Soft, nontender. No rebound. No rigidity. BS present	peg in place   Cardiovascular: RRR ,S1S2 present  Neurologic: Non-focal. Moving all extremities.  Extremities: No edema. No erythema. No calf tenderness.  Skin: No rashes, No ecchymoses, No cyanosis.	  wounds ,skin lesions-See skin assesment flow sheet   LABS:                        11.5   7.70  )-----------( 283      ( 22 Jun 2021 05:29 )             36.8     06-22    141  |  109<H>  |  19  ----------------------------<  119<H>  4.3   |  30  |  0.73    Ca    9.2      22 Jun 2021 05:29  Phos  2.4     06-22  Mg     2.6     06-22    TPro  7.6  /  Alb  3.0<L>  /  TBili  0.5  /  DBili  x   /  AST  52<H>  /  ALT  79<H>  /  AlkPhos  110  06-22    CAPILLARY BLOOD GLUCOSE      POCT Blood Glucose.: 129 mg/dL (22 Jun 2021 12:12)  POCT Blood Glucose.: 108 mg/dL (22 Jun 2021 05:21)  POCT Blood Glucose.: 106 mg/dL (21 Jun 2021 23:51)  POCT Blood Glucose.: 106 mg/dL (21 Jun 2021 18:57)    PT/INR - ( 22 Jun 2021 05:29 )   PT: 11.3 sec;   INR: 0.96 ratio         PTT - ( 22 Jun 2021 05:29 )  PTT:33.0 sec      Culture - Blood (collected 18 Jun 2021 01:07)  Source: .Blood Blood-Venous  Preliminary Report (19 Jun 2021 02:01):    No growth to date.    Culture - Blood (collected 18 Jun 2021 01:07)  Source: .Blood Blood-Peripheral  Preliminary Report (19 Jun 2021 02:01):    No growth to date.    Culture - Urine (collected 15 Zay 2021 16:42)  Source: .Urine Catheterized  Final Report (16 Jun 2021 21:47):    50,000 - 99,000 CFU/mL Presumptive Candida albicans    "Susceptibilities not performed"    Culture - Sputum (collected 15 Zay 2021 16:28)  Source: .Sputum Sputum  Gram Stain (15 Zay 2021 18:57):    Few polymorphonuclear leukocytes per low power field    Few Squamous epithelial cells per low power field    Rare Gram Negative Rods per oil power field  Final Report (17 Jun 2021 17:45):    Normal Respiratory Elvira present    Culture - Blood (collected 15 Zay 2021 04:41)  Source: .Blood Blood-Peripheral  Final Report (20 Jun 2021 05:00):    No Growth Final    Culture - Blood (collected 15 Zay 2021 04:41)  Source: .Blood Blood-Peripheral  Final Report (20 Jun 2021 05:00):    No Growth Final      RADIOLOGY & ADDITIONAL TESTS:   reviewed elctronically  ASSESSMENT/PLAN: 	  25minutes spent on this visit, 50% visit time spent in care co-ordination with other attendings and counselling patient

## 2021-06-22 NOTE — PROGRESS NOTE ADULT - SUBJECTIVE AND OBJECTIVE BOX
BREA BECKHAM    [facility] ICU[facility] 12A    Patient is a 75y old  Female who presents with a chief complaint of fever and dyspnea (22 Jun 2021 09:50)       Allergies    codeine (Hives)    Intolerances        HPI:  76 yo Female ,[facility] resident with hx of  PMH Frontotemporal dementia, CVA, chronic vent dependent respiratory failure, dysphagia s/p PEG, recurrent UTI/VAP presents with septic shock from [facility] facility. She was last admitted at [facility] 10/ 2020 with proteus UTI ,sepsis ,hypernatremia and RYAN .Pseudomonas grew in sputum resistant to zosyn last time Admitted for septic workup and evaluation,send blood and urine cx,serial lactate levels,monitor vitals closley,ivfs hydration,monitor urine output and renal profile,iv abx initiated e. On arrival to ER found to be febrile to 102, WBC 20k, lactate 6.7. CXR with haziness of entire right side, urine appears cloudy. Med hx is significant for Advanced dementia ,s/p  Aphasic stroke , Constipation  ,COVID-19   ,CVA (cerebral vascular accident)  ,Dementia of frontal lobe type  ,Diabetes mellitus  ,DM (diabetes mellitus)type  2   ,GERD (gastroesophageal reflux disease)  ,HTN (hypertension)  ,Hypertension  ,Pneumonia  ,Quadriplegia  ,Respiratory failure ,s/p tracheostomy and peg Palliative care consult requested ,to discuss advance directives and complete /update  MOLST  (15 Zay 2021 07:06)      PAST MEDICAL & SURGICAL HISTORY:  Dementia of frontal lobe type    Aphasic stroke    Diabetes mellitus    Respiratory failure    Hypertension    GERD (gastroesophageal reflux disease)    Constipation    Respiratory failure    CVA (cerebral vascular accident)    HTN (hypertension)    DM (diabetes mellitus)    Advanced dementia    COVID-19 virus detected    Quadriplegia    Pneumonia    Type II diabetes mellitus    Hx of appendectomy    Gastrostomy in place    Tracheostomy in place    Tracheostomy tube present    Feeding by G-tube        FAMILY HISTORY:  No pertinent family history in first degree relatives          MEDICATIONS   acetaminophen    Suspension .. 650 milliGRAM(s) Oral every 6 hours PRN  ALBUTerol    90 MICROgram(s) HFA Inhaler 2 Puff(s) Inhalation every 6 hours  aspirin  chewable 81 milliGRAM(s) Oral daily  bisacodyl 5 milliGRAM(s) Oral at bedtime  caspofungin IVPB      caspofungin IVPB 50 milliGRAM(s) IV Intermittent every 24 hours  chlorhexidine 2% Cloths 1 Application(s) Topical <User Schedule>  dextrose 40% Gel 15 Gram(s) Oral once  dextrose 5%. 1000 milliLiter(s) IV Continuous <Continuous>  dextrose 5%. 1000 milliLiter(s) IV Continuous <Continuous>  dextrose 50% Injectable 25 Gram(s) IV Push once  dextrose 50% Injectable 12.5 Gram(s) IV Push once  dextrose 50% Injectable 25 Gram(s) IV Push once  enoxaparin Injectable 40 milliGRAM(s) SubCutaneous daily  fentaNYL   Patch  12 MICROgram(s)/Hr 1 Patch Transdermal every 72 hours  glucagon  Injectable 1 milliGRAM(s) IntraMuscular once  insulin glargine Injectable (LANTUS) 20 Unit(s) SubCutaneous at bedtime  insulin lispro (ADMELOG) corrective regimen sliding scale   SubCutaneous every 6 hours  ipratropium 17 MICROgram(s) HFA Inhaler 1 Puff(s) Inhalation every 6 hours  LORazepam     Tablet 1 milliGRAM(s) Oral every 8 hours  meropenem  IVPB 1000 milliGRAM(s) IV Intermittent every 8 hours  midodrine 5 milliGRAM(s) Oral every 8 hours  norepinephrine Infusion 0.05 MICROgram(s)/kG/Min IV Continuous <Continuous>  pantoprazole   Suspension 40 milliGRAM(s) Oral before breakfast  polyethylene glycol 3350 17 Gram(s) Oral every 12 hours  potassium phosphate / sodium phosphate Powder (PHOS-NaK) 2 Packet(s) Oral daily  senna 2 Tablet(s) Oral at bedtime  sucralfate suspension 1 Gram(s) Oral every 6 hours      Vital Signs Last 24 Hrs  T(C): 36.8 (22 Jun 2021 07:05), Max: 37.3 (21 Jun 2021 15:16)  T(F): 98.2 (22 Jun 2021 07:05), Max: 99.1 (21 Jun 2021 15:16)  HR: 80 (22 Jun 2021 08:13) (56 - 102)  BP: 142/62 (22 Jun 2021 07:00) (90/44 - 153/65)  BP(mean): 89 (22 Jun 2021 07:00) (63 - 105)  RR: 16 (22 Jun 2021 07:00) (6 - 39)  SpO2: 99% (22 Jun 2021 08:13) (88% - 100%)      06-21-21 @ 07:01  -  06-22-21 @ 07:00  --------------------------------------------------------  IN: 3170 mL / OUT: 900 mL / NET: 2270 mL        Mode: AC/ CMV (Assist Control/ Continuous Mandatory Ventilation), RR (machine): 14, TV (machine): 400, FiO2: 25, PEEP: 5, ITime: 1, MAP: 12, PIP: 24    LABS:                        11.5   7.70  )-----------( 283      ( 22 Jun 2021 05:29 )             36.8     06-22    141  |  109<H>  |  19  ----------------------------<  119<H>  4.3   |  30  |  0.73    Ca    9.2      22 Jun 2021 05:29  Phos  2.4     06-22  Mg     2.6     06-22    TPro  7.6  /  Alb  3.0<L>  /  TBili  0.5  /  DBili  x   /  AST  52<H>  /  ALT  79<H>  /  AlkPhos  110  06-22    PT/INR - ( 22 Jun 2021 05:29 )   PT: 11.3 sec;   INR: 0.96 ratio         PTT - ( 22 Jun 2021 05:29 )  PTT:33.0 sec          WBC:  WBC Count: 7.70 K/uL (06-22 @ 05:29)  WBC Count: 5.84 K/uL (06-21 @ 05:51)  WBC Count: 5.38 K/uL (06-20 @ 05:34)  WBC Count: 9.62 K/uL (06-19 @ 09:27)      MICROBIOLOGY:  RECENT CULTURES:  06-18 .Blood Blood-Peripheral XXXX XXXX   No growth to date.    06-15 .Urine Catheterized XXXX XXXX   50,000 - 99,000 CFU/mL Presumptive Candida albicans  "Susceptibilities not performed"    06-15 .Sputum Sputum XXXX   Few polymorphonuclear leukocytes per low power field  Few Squamous epithelial cells per low power field  Rare Gram Negative Rods per oil power field   Normal Respiratory Elvira present                PT/INR - ( 22 Jun 2021 05:29 )   PT: 11.3 sec;   INR: 0.96 ratio         PTT - ( 22 Jun 2021 05:29 )  PTT:33.0 sec    Sodium:  Sodium, Serum: 141 mmol/L (06-22 @ 05:29)  Sodium, Serum: 142 mmol/L (06-21 @ 05:51)  Sodium, Serum: 143 mmol/L (06-20 @ 05:34)  Sodium, Serum: 143 mmol/L (06-19 @ 09:27)      0.73 mg/dL 06-22 @ 05:29  0.78 mg/dL 06-21 @ 05:51  0.87 mg/dL 06-20 @ 05:34  1.10 mg/dL 06-19 @ 09:27      Hemoglobin:  Hemoglobin: 11.5 g/dL (06-22 @ 05:29)  Hemoglobin: 10.2 g/dL (06-21 @ 05:51)  Hemoglobin: 9.8 g/dL (06-20 @ 05:34)  Hemoglobin: 10.7 g/dL (06-19 @ 09:27)      Platelets: Platelet Count - Automated: 283 K/uL (06-22 @ 05:29)  Platelet Count - Automated: 277 K/uL (06-21 @ 05:51)  Platelet Count - Automated: 261 K/uL (06-20 @ 05:34)  Platelet Count - Automated: 261 K/uL (06-19 @ 09:27)      LIVER FUNCTIONS - ( 22 Jun 2021 05:29 )  Alb: 3.0 g/dL / Pro: 7.6 g/dL / ALK PHOS: 110 U/L / ALT: 79 U/L / AST: 52 U/L / GGT: x                 RADIOLOGY & ADDITIONAL STUDIES:

## 2021-06-22 NOTE — DISCHARGE NOTE PROVIDER - NSDCCPCAREPLAN_GEN_ALL_CORE_FT
PRINCIPAL DISCHARGE DIAGNOSIS  Diagnosis: Septic shock  Assessment and Plan of Treatment: You were admitted to the hospital due to septic shock likely caused from recurrent ventilator associated pneumonia or UTI. You were treated in the ICU with IV antibiotics and IV norepinephrine to increase your blood pressure. You were also treated with antifungal medications for fungal UTI. Your blood cultures remained negative. You were seen by infectious disease and pulmonology specialists.  You will be discharged to your long term care facility for further care      SECONDARY DISCHARGE DIAGNOSES  Diagnosis: RYAN (acute kidney injury)  Assessment and Plan of Treatment:     Diagnosis: Pneumonia due to infectious organism, unspecified laterality, unspecified part of lung  Assessment and Plan of Treatment:

## 2021-06-22 NOTE — DISCHARGE NOTE PROVIDER - CARE PROVIDER_API CALL
TAMICA JENSEN  Internal Medicine  [address] [place] [id number]  Phone: [phone]  Fax: [phone]  Follow Up Time: 1-3 days    Heriberto Andrea)  Critical Care Medicine; HospicePalliative Medicine; Internal Medicine; Pulmonary Disease  [address] [place] [address]  Phone: [phone]  Fax: [phone]  Follow Up Time: 1-3 days

## 2021-06-22 NOTE — PROGRESS NOTE ADULT - SUBJECTIVE AND OBJECTIVE BOX
Patient is a 75y old  Female who presents with a chief complaint of fever and dyspnea (22 Jun 2021 10:05)    24 hour events: Patient seen and examined at bedside. Started on RTC Ativan without repeat evidence of abnormal movements. Pt is stable for floor transfer or if bed available at long term care facility, stable for discharge.    REVIEW OF SYSTEMS  Unable to obtain 2/2 mental status    T(F): 98.6 (06-22-21 @ 12:26), Max: 99.1 (06-21-21 @ 15:16)  HR: 83 (06-22-21 @ 12:00) (56 - 102)  BP: 118/56 (06-22-21 @ 12:00) (90/44 - 163/73)  RR: 25 (06-22-21 @ 12:00) (6 - 39)  SpO2: 95% (06-22-21 @ 12:00) (88% - 100%)  Wt(kg): --    Mode: AC/ CMV (Assist Control/ Continuous Mandatory Ventilation), RR (machine): 14, TV (machine): 400, FiO2: 25, PEEP: 5        I&O's Summary    06-21 @ 07:01  -  06-22 @ 07:00  --------------------------------------------------------  IN: 3170 mL / OUT: 900 mL / NET: 2270 mL      PHYSICAL EXAM  General: Elderly, chronic trach to vent, ill-appearing  HEENT: Pupils equal, reactive to light. Symmetric  PULM: Clear to auscultation bilaterally, no significant sputum production. No wheezes or rales.  CVS: Regular rate and rhythm, no murmurs, rubs, or gallops  ABD: Mild distention. Soft,, normoactive bowel sounds, no guarding. +PEG in place, c/d/i  EXT: No edema, nontender  SKIN: Warm and dry   NEURO: opens eyes to voice, awake but does not follow command, contracted UE > LE    MEDICATIONS  caspofungin IVPB   caspofungin IVPB IV Intermittent  meropenem  IVPB IV Intermittent    norepinephrine Infusion IV Continuous    dextrose 40% Gel Oral  dextrose 50% Injectable IV Push  dextrose 50% Injectable IV Push  dextrose 50% Injectable IV Push  glucagon  Injectable IntraMuscular  insulin glargine Injectable (LANTUS) SubCutaneous  insulin lispro (ADMELOG) corrective regimen sliding scale SubCutaneous    ALBUTerol    90 MICROgram(s) HFA Inhaler Inhalation  ipratropium 17 MICROgram(s) HFA Inhaler Inhalation    acetaminophen    Suspension .. Oral PRN  LORazepam     Tablet Oral      aspirin  chewable Oral  enoxaparin Injectable SubCutaneous    bisacodyl Oral  pantoprazole   Suspension Oral  polyethylene glycol 3350 Oral  senna Oral  sucralfate suspension Oral      dextrose 5%. IV Continuous  dextrose 5%. IV Continuous  potassium phosphate / sodium phosphate Powder (PHOS-NaK) Oral      chlorhexidine 2% Cloths Topical                            11.5   7.70  )-----------( 283      ( 22 Jun 2021 05:29 )             36.8       06-22    141  |  109<H>  |  19  ----------------------------<  119<H>  4.3   |  30  |  0.73    Ca    9.2      22 Jun 2021 05:29  Phos  2.4     06-22  Mg     2.6     06-22    TPro  7.6  /  Alb  3.0<L>  /  TBili  0.5  /  DBili  x   /  AST  52<H>  /  ALT  79<H>  /  AlkPhos  110  06-22          PT/INR - ( 22 Jun 2021 05:29 )   PT: 11.3 sec;   INR: 0.96 ratio         PTT - ( 22 Jun 2021 05:29 )  PTT:33.0 sec    .Blood Blood-Peripheral   No growth to date. -- 06-18 @ 01:07        Radiology: No new imaging      CENTRAL LINE: N           REID: N                 A-LINE: N                          GLOBAL ISSUE/BEST PRACTICE  Analgesia: N  Sedation: N  CAM-ICU: Y  HOB elevation: yes  Stress ulcer prophylaxis: Y  VTE prophylaxis: Y  Glycemic control: Y  Nutrition: Y    CODE STATUS: FULL  GOC discussion Y

## 2021-06-22 NOTE — PROGRESS NOTE ADULT - SUBJECTIVE AND OBJECTIVE BOX
[facility], Division of Infectious Diseases  MOIZ Lora Y. Patel, S. Shah  [phone]    Name: BREA BECKHAM  Age: 75y  Gender: Female  MRN: [id number]    Interval History:  Patient seen and examined at bedside this morning in the ICU  No acute overnight events. Afebrile  Continues to remain on vent support  unable to obtain hx  Notes reviewed    Antibiotics:  caspofungin IVPB      caspofungin IVPB 50 milliGRAM(s) IV Intermittent every 24 hours  meropenem  IVPB 1000 milliGRAM(s) IV Intermittent every 8 hours      Medications:  acetaminophen    Suspension .. 650 milliGRAM(s) Oral every 6 hours PRN  ALBUTerol    90 MICROgram(s) HFA Inhaler 2 Puff(s) Inhalation every 6 hours  aspirin  chewable 81 milliGRAM(s) Oral daily  bisacodyl 5 milliGRAM(s) Oral at bedtime  caspofungin IVPB      caspofungin IVPB 50 milliGRAM(s) IV Intermittent every 24 hours  chlorhexidine 2% Cloths 1 Application(s) Topical <User Schedule>  dextrose 40% Gel 15 Gram(s) Oral once  dextrose 5%. 1000 milliLiter(s) IV Continuous <Continuous>  dextrose 5%. 1000 milliLiter(s) IV Continuous <Continuous>  dextrose 50% Injectable 25 Gram(s) IV Push once  dextrose 50% Injectable 12.5 Gram(s) IV Push once  dextrose 50% Injectable 25 Gram(s) IV Push once  enoxaparin Injectable 40 milliGRAM(s) SubCutaneous daily  fentaNYL   Patch  12 MICROgram(s)/Hr 1 Patch Transdermal every 72 hours  glucagon  Injectable 1 milliGRAM(s) IntraMuscular once  insulin glargine Injectable (LANTUS) 20 Unit(s) SubCutaneous at bedtime  insulin lispro (ADMELOG) corrective regimen sliding scale   SubCutaneous every 6 hours  ipratropium 17 MICROgram(s) HFA Inhaler 1 Puff(s) Inhalation every 6 hours  LORazepam     Tablet 1 milliGRAM(s) Oral every 8 hours  meropenem  IVPB 1000 milliGRAM(s) IV Intermittent every 8 hours  midodrine 5 milliGRAM(s) Oral every 8 hours  norepinephrine Infusion 0.05 MICROgram(s)/kG/Min IV Continuous <Continuous>  pantoprazole   Suspension 40 milliGRAM(s) Oral before breakfast  polyethylene glycol 3350 17 Gram(s) Oral every 12 hours  potassium phosphate / sodium phosphate Powder (PHOS-NaK) 2 Packet(s) Oral daily  senna 2 Tablet(s) Oral at bedtime  sucralfate suspension 1 Gram(s) Oral every 6 hours      Review of Systems:  unable to obtain    Allergies: codeine (Hives)    For details regarding the patient's past medical history, social history, family history, and other miscellaneous elements, please refer the initial infectious diseases consultation and/or the admitting history and physical examination for this admission.    Objective:  Vitals:   T(C): 36.8 (06-22-21 @ 07:05), Max: 37.3 (06-21-21 @ 15:16)  HR: 80 (06-22-21 @ 08:13) (56 - 102)  BP: 142/62 (06-22-21 @ 07:00) (90/44 - 153/65)  RR: 16 (06-22-21 @ 07:00) (6 - 39)  SpO2: 99% (06-22-21 @ 08:13) (88% - 100%)    Physical Examination:  General: sleeping  HEENT: NC/AT, anicteric, neck supple, trach to vent  Cardio: S1, S2 heard, RRR, no murmurs  Resp: MV breath sounds  Abd: soft, no grimacing on palpation  Neuro: awake, not following   Ext: no edema or cyanosis  Skin: warm, dry, no visible rash    Laboratory Studies:  CBC:                       11.5   7.70  )-----------( 283      ( 22 Jun 2021 05:29 )             36.8     CMP: 06-22    141  |  109<H>  |  19  ----------------------------<  119<H>  4.3   |  30  |  0.73    Ca    9.2      22 Jun 2021 05:29  Phos  2.4     06-22  Mg     2.6     06-22    TPro  7.6  /  Alb  3.0<L>  /  TBili  0.5  /  DBili  x   /  AST  52<H>  /  ALT  79<H>  /  AlkPhos  110  06-22    LIVER FUNCTIONS - ( 22 Jun 2021 05:29 )  Alb: 3.0 g/dL / Pro: 7.6 g/dL / ALK PHOS: 110 U/L / ALT: 79 U/L / AST: 52 U/L / GGT: x               Microbiology: reviewed    Culture - Blood (collected 06-18-21 @ 01:07)  Source: .Blood Blood-Venous  Preliminary Report (06-19-21 @ 02:01):    No growth to date.    Culture - Blood (collected 06-18-21 @ 01:07)  Source: .Blood Blood-Peripheral  Preliminary Report (06-19-21 @ 02:01):    No growth to date.    Culture - Urine (collected 06-15-21 @ 16:42)  Source: .Urine Catheterized  Final Report (06-16-21 @ 21:47):    50,000 - 99,000 CFU/mL Presumptive Candida albicans    "Susceptibilities not performed"    Culture - Sputum (collected 06-15-21 @ 16:28)  Source: .Sputum Sputum  Gram Stain (06-15-21 @ 18:57):    Few polymorphonuclear leukocytes per low power field    Few Squamous epithelial cells per low power field    Rare Gram Negative Rods per oil power field  Final Report (06-17-21 @ 17:45):    Normal Respiratory Elvira present    Culture - Blood (collected 06-15-21 @ 04:41)  Source: .Blood Blood-Peripheral  Final Report (06-20-21 @ 05:00):    No Growth Final    Culture - Blood (collected 06-15-21 @ 04:41)  Source: .Blood Blood-Peripheral  Final Report (06-20-21 @ 05:00):    No Growth Final        Radiology: reviewed

## 2021-06-22 NOTE — DISCHARGE NOTE PROVIDER - PROVIDER TOKENS
PROVIDER:[TOKEN:[[id number]],FOLLOWUP:[1-3 days]],PROVIDER:[TOKEN:[[id number]],FOLLOWUP:[1-3 days]]

## 2021-06-22 NOTE — PROGRESS NOTE ADULT - ASSESSMENT
[facility] 75[facility] w/ PMHx of frontotemporal dementia, CVA, CHRF requiring vent, dysphagia s/p PEG, recurrent UTI/VAP p/w septic shock 2/2 UTI vs PNA.    RECOMMENDATIONS  Septic Shock 2/2 recurrent VAP vs recurrent UTI  Candiduria  Shock/Hypotension--off pressors now  ARF--resolved  Leukocytosis--resolved  Prior micro with Pseudomonas aeruginosa -  Piperacillin/Tazobactam: R >64 in sputum, Proteus mirabilis (Indole negative) in urine  6/15 RCx NOF  6/15 UCx +candida albicans - ?colonization vs UTI  6/18 BCx NGTD  MRSA screen negative  Fungitell negative   Trend temps/CBC  Pulmonary toilet  Appreciate ICU care  -c/w meropenem (6/15-)  -c/w caspofungin (added on 6/17) - can plan to complete 7 day course for possible UTI until 6/23    Infectious Diseases will continue to follow. Please call with any questions.   Andressa Brantley M.D.  [facility], Division of Infectious Diseases [phone]  For over the weekend and after hours, please call [phone]     Pt is a 75W from [facility], w/ PMHx of frontotemporal dementia, CVA, CHRF requiring vent, dysphagia s/p PEG, recurrent UTI/VAP p/w septic shock 2/2 UTI vs PNA.    RECOMMENDATIONS  Septic Shock 2/2 recurrent VAP vs recurrent UTI  Candiduria  Shock/Hypotension--off pressors now  ARF--resolved  Leukocytosis--resolved  Prior micro with Pseudomonas aeruginosa -  Piperacillin/Tazobactam: R >64 in sputum, Proteus mirabilis (Indole negative) in urine  6/15 RCx NOF  6/15 UCx +candida albicans - ?colonization vs UTI  6/18 BCx NGTD  MRSA screen negative  Fungitell negative   Trend temps/CBC  Pulmonary toilet  Appreciate ICU care  -c/w meropenem (6/15-) - can plan for x10 day course to complete 6/24  -c/w caspofungin (added on 6/17) - can plan to complete 7 day course for possible UTI until 6/23    Dr. Olivas covering the service starting tomorrow  Infectious Diseases will continue to follow. Please call with any questions.   Andressa Brantley M.D.  [facility], Division of Infectious Diseases [phone]  For over the weekend and after hours, please call [phone]

## 2021-06-22 NOTE — DISCHARGE NOTE PROVIDER - NSDCMRMEDTOKEN_GEN_ALL_CORE_FT
aspirin 81 mg oral tablet, chewable: 1 tab(s) by PEG tube once a day  atropine 1% ophthalmic solution: instill 1 drop under tongue 3 times a day as needed   Carafate 1 g/10 mL oral suspension: 10 milliliter(s) by gastrostomy tube 4 times a day (before meals and at bedtime) for 14 days (Started 6/4/21)  docusate sodium 60 mg/15 mL oral syrup: 75 milliliter(s) by gastrostomy tube once a day (at bedtime)  enoxaparin 40 mg/0.4 mL injectable solution: 40 milligram(s) injectable once a day  fentaNYL 12 mcg/hr transdermal film, extended release: 1 patch transdermal every 72 hours  Fleet Enema 7 g-19 g rectal enema: 133 milliliter(s) rectal Monday, Wednesday, and Friday and PRN  ipratropium-albuterol 0.5 mg-2.5 mg/3 mL inhalation solution: 3 milliliter(s) inhaled 4 times a day  Lantus Solostar Pen 100 units/mL subcutaneous solution: 40 unit(s) subcutaneous once a day (at bedtime)  Metamucil 3.4 g/7 g oral powder for reconstitution: 17 gram(s) by gastrostomy tube once a day (at bedtime)  metFORMIN 500 mg oral tablet: 1 tab(s) by gastrostomy tube 2 times a day  Metoprolol Tartrate 50 mg oral tablet: 0.5 tab(s) by gastrostomy tube every 12 hours for HR more than 120   MiraLax oral powder for reconstitution: 17 gram(s) by gastrostomy tube 2 times a day  pantoprazole 40 mg oral granule, delayed release: 1 packet(s) by gastrostomy tube once a day   acetaminophen 160 mg/5 mL oral suspension: 20.31 milliliter(s) by gastrostomy tube every 6 hours, As Needed  albuterol 90 mcg/inh inhalation aerosol: 2 puff(s) inhaled every 6 hours  aspirin 81 mg oral tablet, chewable: 1 tab(s) by PEG tube once a day  Bacid (LAC) oral tablet: 2 tab(s) by gastrostomy tube once a day  bisacodyl 5 mg oral delayed release tablet: 1 tab(s) orally once a day (at bedtime)  Carafate 1 g/10 mL oral suspension: 10 milliliter(s) by gastrostomy tube 4 times a day (before meals and at bedtime) for 14 days (Started 6/4/21)  enoxaparin 40 mg/0.4 mL injectable solution: 40 milligram(s) injectable once a day  fentaNYL 12 mcg/hr transdermal film, extended release: 1 patch transdermal every 72 hours  insulin lispro 100 units/mL injectable solution:  injectable corrective regimen sliding scale   ipratropium-albuterol 0.5 mg-2.5 mg/3 mL inhalation solution: 3 milliliter(s) inhaled 4 times a day  Lantus Solostar Pen 100 units/mL subcutaneous solution: 40 unit(s) subcutaneous once a day (at bedtime)  LORazepam 1 mg oral tablet: 1 tab(s) by gastrostomy tube every 8 hours  meropenem 1000 mg intravenous injection: 1000 milligram(s) intravenous every 8 hours x 3 doses   pantoprazole 40 mg oral granule, delayed release: 1 packet(s) by gastrostomy tube once a day  polyethylene glycol 3350 oral powder for reconstitution: 17 gram(s) orally every 12 hours  senna 8.6 mg oral tablet: 1 tab(s) by gastrostomy tube once a day (at bedtime)

## 2021-06-22 NOTE — PROGRESS NOTE ADULT - ASSESSMENT
PARASHARAMI BREA 75 f [facility] P 6/15/2021   DR ILIANA KEMP      REVIEW OF SYMPTOMS      Able to give (reliable) ROS  NO     PHYSICAL EXAM    HEENT Unremarkable  atraumatic   RESP Fair air entry EXP prolonged    Harsh breath sound Resp distres mild   CARDIAC S1 S2 No S3     NO JVD    ABDOMEN SOFT BS PRESENT NOT DISTENDED No hepatosplenomegaly   PEDAL EDEMA present No calf tenderness  NO rash     PARASHARAMI BREA 75 f [facility] P 6/15/2021 ADMISSION PROBLEMS    COVID STATUS   scv2 6/15/2021 (-)   spkab 6/16 (+)     SEPTIC SHOCK poa  resolvd 6/16/2021   NOREPI NEEDED 6/19/2021 BRIEFLY     VAP poa  SIGMOID COLITIS 6/15/2021 CTAP    CANDIDUIA 6/17/2021   CASPOFUNGIN 6/17/2021     COPD   ANEMIA   HYPERNATREMIA r  FREE WATER 6/17/2021   RYAN resolvd 6/16/2021     PMH VENT DEPENDENT   PMH TRACH  PMH PEG   PMH UTI VAP   PMH ZOSYN RESISTANT PSEUDOMONAS   PMH DEMENTIA   PMH CVA .    GLOBAL AND BEST PRACTICE ISSUES                     ALLERGY.    CODEINE   WEIGHT.     6/15/2021 53                         BMI               6/15/2021 20.                                         .                          ADVANCED DIRECTIVE.                               HEAD OF BED ELEVATION. Yes  DVT PROPHYLAXIS.   HPSC (6/15/2021)   -> lvnx 40 96/18/2021)                  SQUIRES PROPHYLAXIS.       PROTONIX 40 (6/15/2021)   APA.      ASA 81 (6/15/2021)                                                               DYSPHAGIA RECOMM.   DIET.    GLUCERNA 1.5 1000 (6/15/2021)   INFECTION PROPHYLAXIS.   CHLORHEXIDINE 2% (6/15/2021)   FREE WATER.    250.6 (6/17/2021)   IV   PATIENT DATA                ABG.     6/15/2021 12 40% /400/5/14 742/34/166              OXYGENATION.                   VITALS/IO/VENT/DRIPS.   6/22/2021 74 95/50   6/22/2021 ac 14/400/5/.21     ASSESSMENT/RECOMMENDATIONS.    SEPTIC SHOCK poa   target map 65 (+)     VAP poa  SIGMOID COLITIS 6/15/2021 CTAP   FUNGURIA 6/15    w 6/14-6/15-6/16-5/16-5/19-5/21 w 24-12 - 11.2-11.3 - 9.6 - 5.8   ua 6/15/2021 w 6-10 nitr (-) l estr mod   ct cap nc 6/15/2021 mild sigmoid colitis patchy opac lower lobes possibly aspiration   rvp 6/15/2021 rvp (-)  sp sm 6/15 rare gnr   fungtell 6/19/2021 fungitell 44   mrsa 6/15 (-)   urine 6/15 50-99 c  blod c 6/18 (-)   blod c 6/15 (-)    MEROPENEM 1.2 (6/15/2021)   Complete 8 d course   CAPSO 6/17/2021       VENT MANAGEMENT   VENT BUNDLE Target pH 730 (+) PO2 60 (+) po 90-95% Pplat 35 (-)   HOBE DSV DSBT Restrictive fluid strategy   Gas exchange good on 6/15/2021 12 a     COPD   PROV HFA (6/15/2021)   ATRIV (6/15/2021)   under control    RO MI  Tr 6/15/2021 Tr (-)   echo 6/15 n lvsf   mi ruled out     ANEMIA   Hb 6/146/15-6/16-6/18-6/19-5/21 Hb 12.3- 9.7-10.2 -10.5  - 10.7 -  1-.2   target hb 7 (+)     HYPERNATREMIA   Na 6/15-6/16-6/17-6/18-6/19/2021 Na 147 -[phone]    monitor    RYAN  cr 6/14-6/15-6/16-6/19 Cr 1.7-1.3-1.1 - 1.1   improvd       OVERALL PLAN.  VAP/SIGMOID COLITIS poa BSAB   CANDIDURIA capsofung 6/17/2021   VENT MANAGMNT   ANEMIA Monitor  RENAL Monitor   dc planning once abio corse completdv      TIME SPENT   Over 25 minutes aggregate care time spent on encounter; activities included   direct patient care, counseling and/or coordinating care reviewing notes, lab data/ imaging , discussion with multidisciplinary team/ patient  /family and explaining in detail risks, benefits, alternatives  of the recommendations     CHAPINCITO GUIDRY 75 f [facility] P 6/15/2021   DR ILIANA KEMP

## 2021-06-22 NOTE — PROGRESS NOTE ADULT - SUBJECTIVE AND OBJECTIVE BOX
Date/Time Patient Seen:  		  Referring MD:   Data Reviewed	       Patient is a 75y old  Female who presents with a chief complaint of fever and dyspnea (21 Jun 2021 09:48)      Subjective/HPI     PAST MEDICAL & SURGICAL HISTORY:  Dementia of frontal lobe type    Aphasic stroke    Diabetes mellitus    Respiratory failure    Hypertension    GERD (gastroesophageal reflux disease)    Constipation    Respiratory failure    CVA (cerebral vascular accident)    HTN (hypertension)    DM (diabetes mellitus)    Advanced dementia    COVID-19 virus detected    Quadriplegia    Pneumonia    Type II diabetes mellitus    Hx of appendectomy    Gastrostomy in place    Tracheostomy in place    Tracheostomy tube present    Feeding by G-tube          Medication list         MEDICATIONS  (STANDING):  ALBUTerol    90 MICROgram(s) HFA Inhaler 2 Puff(s) Inhalation every 6 hours  aspirin  chewable 81 milliGRAM(s) Oral daily  bisacodyl 5 milliGRAM(s) Oral at bedtime  caspofungin IVPB 50 milliGRAM(s) IV Intermittent every 24 hours  caspofungin IVPB      chlorhexidine 2% Cloths 1 Application(s) Topical <User Schedule>  dextrose 40% Gel 15 Gram(s) Oral once  dextrose 5%. 1000 milliLiter(s) (50 mL/Hr) IV Continuous <Continuous>  dextrose 5%. 1000 milliLiter(s) (100 mL/Hr) IV Continuous <Continuous>  dextrose 50% Injectable 25 Gram(s) IV Push once  dextrose 50% Injectable 12.5 Gram(s) IV Push once  dextrose 50% Injectable 25 Gram(s) IV Push once  enoxaparin Injectable 40 milliGRAM(s) SubCutaneous daily  fentaNYL   Patch  12 MICROgram(s)/Hr 1 Patch Transdermal every 72 hours  glucagon  Injectable 1 milliGRAM(s) IntraMuscular once  insulin glargine Injectable (LANTUS) 20 Unit(s) SubCutaneous at bedtime  insulin lispro (ADMELOG) corrective regimen sliding scale   SubCutaneous every 6 hours  ipratropium 17 MICROgram(s) HFA Inhaler 1 Puff(s) Inhalation every 6 hours  LORazepam     Tablet 1 milliGRAM(s) Oral every 8 hours  meropenem  IVPB 1000 milliGRAM(s) IV Intermittent every 8 hours  midodrine 5 milliGRAM(s) Oral every 8 hours  norepinephrine Infusion 0.05 MICROgram(s)/kG/Min (4.97 mL/Hr) IV Continuous <Continuous>  pantoprazole   Suspension 40 milliGRAM(s) Oral before breakfast  polyethylene glycol 3350 17 Gram(s) Oral every 12 hours  senna 2 Tablet(s) Oral at bedtime  sucralfate suspension 1 Gram(s) Oral every 6 hours    MEDICATIONS  (PRN):  acetaminophen    Suspension .. 650 milliGRAM(s) Oral every 6 hours PRN Temp greater or equal to 38C (100.4F)         Vitals log        ICU Vital Signs Last 24 Hrs  T(C): 37 (22 Jun 2021 03:26), Max: 37.3 (21 Jun 2021 15:16)  T(F): 98.6 (22 Jun 2021 03:26), Max: 99.1 (21 Jun 2021 15:16)  HR: 76 (22 Jun 2021 05:40) (56 - 102)  BP: 132/61 (22 Jun 2021 04:00) (90/44 - 155/68)  BP(mean): 88 (22 Jun 2021 04:00) (63 - 105)  ABP: --  ABP(mean): --  RR: 14 (22 Jun 2021 04:00) (6 - 39)  SpO2: 97% (22 Jun 2021 04:00) (88% - 100%)       Mode: AC/ CMV (Assist Control/ Continuous Mandatory Ventilation)  RR (machine): 14  TV (machine): 400  FiO2: 25  PEEP: 5  ITime: 1  MAP: 13  PIP: 31      Input and Output:  I&O's Detail    20 Jun 2021 07:01  -  21 Jun 2021 07:00  --------------------------------------------------------  IN:    Enteral Tube Flush: 240 mL    Free Water: 1500 mL    Glucerna 1.5: 1000 mL    IV PiggyBack: 100 mL    IV PiggyBack: 150 mL  Total IN: 2990 mL    OUT:    Norepinephrine: 0 mL    Voided (mL): 950 mL  Total OUT: 950 mL    Total NET: 2040 mL      21 Jun 2021 07:01  -  22 Jun 2021 06:10  --------------------------------------------------------  IN:    Enteral Tube Flush: 120 mL    Free Water: 1250 mL    Glucerna 1.5: 950 mL    IV PiggyBack: 250 mL    IV PiggyBack: 100 mL  Total IN: 2670 mL    OUT:    Norepinephrine: 0 mL    Voided (mL): 450 mL  Total OUT: 450 mL    Total NET: 2220 mL          Lab Data                        11.5   7.70  )-----------( 283      ( 22 Jun 2021 05:29 )             36.8     06-22    141  |  109<H>  |  19  ----------------------------<  119<H>  4.3   |  30  |  0.73    Ca    9.2      22 Jun 2021 05:29  Phos  2.4     06-22  Mg     2.6     06-22    TPro  7.6  /  Alb  3.0<L>  /  TBili  0.5  /  DBili  x   /  AST  52<H>  /  ALT  79<H>  /  AlkPhos  110  06-22            Review of Systems	      Objective     Physical Examination    heart s1s2  lung dec BS  abd soft      Pertinent Lab findings & Imaging      Annalise:  NO   Adequate UO     I&O's Detail    20 Jun 2021 07:01  -  21 Jun 2021 07:00  --------------------------------------------------------  IN:    Enteral Tube Flush: 240 mL    Free Water: 1500 mL    Glucerna 1.5: 1000 mL    IV PiggyBack: 100 mL    IV PiggyBack: 150 mL  Total IN: 2990 mL    OUT:    Norepinephrine: 0 mL    Voided (mL): 950 mL  Total OUT: 950 mL    Total NET: 2040 mL      21 Jun 2021 07:01  -  22 Jun 2021 06:10  --------------------------------------------------------  IN:    Enteral Tube Flush: 120 mL    Free Water: 1250 mL    Glucerna 1.5: 950 mL    IV PiggyBack: 250 mL    IV PiggyBack: 100 mL  Total IN: 2670 mL    OUT:    Norepinephrine: 0 mL    Voided (mL): 450 mL  Total OUT: 450 mL    Total NET: 2220 mL               Discussed with:     Cultures:	        Radiology

## 2021-06-22 NOTE — PROGRESS NOTE ADULT - ASSESSMENT
Acute infection tx in progress  pt remains full code  ID and CCM notes follow up reviewed  monitor HD - Pressors as needed to keep MAP > 60, Midodrine added    74 yo female hx of aphasic stroke, covid 19, trach/vented, dm BIBEMS from [facility] for respiratory distress, found to have fever here 101.8  sepsis eval in progress  on ABX - cx in progress - on Caspo and Meropenem -   supportive ICU measures  vent support  not a candidate for weaning  spoke with   pt is full code  pain rx regimen - Fentanyl - Opioids

## 2021-06-22 NOTE — PROGRESS NOTE ADULT - ASSESSMENT
76 yo Female ,[facility] resident with hx of  PMH Frontotemporal dementia, CVA, chronic vent dependent respiratory failure, dysphagia s/p PEG, recurrent UTI/VAP presents with septic shock from [facility] facility. She was last admitted at \[facility]\"" 10/ 2020 with proteus UTI ,sepsis ,hypernatremia and RYAN .Pseudomonas grew in sputum resistant to zosyn last time Admitted for septic workup and evaluation,send blood and urine cx,serial lactate levels,monitor vitals closley,ivfs hydration,monitor urine output and renal profile,iv abx initiated e. On arrival to ER found to be febrile to 102, WBC 20k, lactate 6.7. CXR with haziness of entire right side, urine appears cloudy. Med hx is significant for Advanced dementia ,s/p  Aphasic stroke , Constipation  ,COVID-19   ,CVA (cerebral vascular accident)  ,Dementia of frontal lobe type  ,Diabetes mellitus  ,DM (diabetes mellitus)type  2   ,GERD (gastroesophageal reflux disease)  ,HTN (hypertension)  ,Hypertension  ,Pneumonia  ,Quadriplegia  ,Respiratory failure ,s/p tracheostomy and peg Palliative care consult requested ,to discuss advance directives and complete /update  MOLST  (15 Zay 2021 07:06)      ACUTE RENAL FAILURE: hypernatremia increase water intake   Serum creatinine is improved  0.73    fentaNYL   Patch  12 MICROgram(s)/Hr 1 Patch Transdermal every 72 hours  midodrine 5 milliGRAM(s) Oral every 8 hours  norepinephrine Infusion 0.05 MICROgram(s)/kG/Min (4.97 mL/Hr) IV    Admit for septic workup and ID evaluation,send blood and urine cx,serial lactate levels,monitor vitals closley,ivfs hydration,monitor urine output and renal profile  meropenem  IVPB 1000 milliGRAM(s) IV Intermittent every 12 hours  enoxaparin Injectable 40 milliGRAM(s) SubCutaneous daily

## 2021-06-22 NOTE — PROGRESS NOTE ADULT - ASSESSMENT
74 yo Female ,[facility] resident with hx of  PMH Frontotemporal dementia, CVA, chronic vent dependent respiratory failure, dysphagia s/p PEG, recurrent UTI/VAP presents with septic shock from [facility] facility. She was last admitted at [facility] 10/ 2020 with proteus UTI ,sepsis ,hypernatremia and RYAN .Pseudomonas grew in sputum resistant to zosyn last time Admitted for septic workup and evaluation,send blood and urine cx,serial lactate levels,monitor vitals closley,ivfs hydration,monitor urine output and renal profile,iv abx initiated e. On arrival to ER found to be febrile to 102, WBC 20k, lactate 6.7. CXR with haziness of entire right side, urine appears cloudy. Med hx is significant for Advanced dementia ,s/p  Aphasic stroke , Constipation  ,COVID-19   ,CVA (cerebral vascular accident)  ,Dementia of frontal lobe type  ,Diabetes mellitus  ,DM (diabetes mellitus)  ,GERD (gastroesophageal reflux disease)  ,HTN (hypertension)  ,Hypertension  ,Pneumonia  ,Quadriplegia  ,Respiratory failure ,s/p tracheostomy and peg Palliative care consult requested ,to discuss advance directives and complete /update  [facility]

## 2021-06-22 NOTE — DISCHARGE NOTE PROVIDER - HOSPITAL COURSE
FROM ADMISSION H+P:   HPI:  74 yo Female ,[facility] resident with hx of  PMH Frontotemporal dementia, CVA, chronic vent dependent respiratory failure, dysphagia s/p PEG, recurrent UTI/VAP presents with septic shock from [facility] facility. She was last admitted at [facility] 10/ 2020 with proteus UTI ,sepsis ,hypernatremia and RYAN .Pseudomonas grew in sputum resistant to zosyn last time Admitted for septic workup and evaluation,send blood and urine cx,serial lactate levels,monitor vitals closley,ivfs hydration,monitor urine output and renal profile,iv abx initiated e. On arrival to ER found to be febrile to 102, WBC 20k, lactate 6.7. CXR with haziness of entire right side, urine appears cloudy. Med hx is significant for Advanced dementia ,s/p  Aphasic stroke , Constipation  ,COVID-19   ,CVA (cerebral vascular accident)  ,Dementia of frontal lobe type  ,Diabetes mellitus  ,DM (diabetes mellitus)type  2   ,GERD (gastroesophageal reflux disease)  ,HTN (hypertension)  ,Hypertension  ,Pneumonia  ,Quadriplegia  ,Respiratory failure ,s/p tracheostomy and peg Palliative care consult requested ,to discuss advance directives and complete /update  MOL  (15 Zay 2021 07:06)    ---  HOSPITAL COURSE: Admitted to the ICU with septic shock from recurrent UTI/VAP from [facility] facility. Blood cultures negative. Urine culture positive for Candida. Sputum culture with few PMNs and Gram neg rods. CT chest/abd/pelvis on admission showed Motion degraded exam. Nonspecific patchy airspace opacities in the lower lobes which can be on an infectious basis or possibly related to aspiration. Clinical correlation recommended. Residual trace left pleural effusion. Questionable mild sigmoid colon thickening. Indeterminant 0.8 cm hyperdense lesion in the upper pole left kidney. Due to previous hx of Pseudomonal PNA resistant to Zosyn and Proteus UTI pt completed a 10 day course of Meropenam. For candidal UTI pt was treated with Capsofungin 7 day course, Fungitell negative. ID was consulted and followed patient throughout hospital course. For hypotension, pt required on/off treatment with Levophed and Midodrine, both medications were dced prior to discharge. For RYAN, neprhology was consulted however pt returned to baseline creatinine before discharge. For episodes of abnormal movements/posturing, pt required occasional doses of Ativan and was eventually started on Ativan standing which aborted these episodes. Prior neurology workup per chart review endorses EEG which ruled out seizure activity however this is unclear as most information was obtained through  and he relates these episodes to timings of bowel movements. Patient improved throughout hospital course and chronic conditons such as Diabetes, chronic vent dependence and enteral feedings were treated appropriately. Patient will be (transferred to floors vs discharge to long term care facility).       ---  CONSULTANTS:     Critical Care: Nissa Ochoa  Pulmonary: Dr. Sandoval  ID: Dr. Olivas  Nephro: Dr. Reddy   Cardio: Dr. Roca  Palliative: Dr. Andrea    -  TIME SPENT:  The total amount of time spent reviewing the hospital notes, laboratory values, imaging findings, assessing/counseling the patient, discussing with consultant physicians, social work, nursing staff was -- minutes

## 2021-06-23 ENCOUNTER — TRANSCRIPTION ENCOUNTER (OUTPATIENT)
Age: 76
End: 2021-06-23

## 2021-06-23 VITALS — OXYGEN SATURATION: 96 %

## 2021-06-23 PROCEDURE — 85730 THROMBOPLASTIN TIME PARTIAL: CPT

## 2021-06-23 PROCEDURE — 85610 PROTHROMBIN TIME: CPT

## 2021-06-23 PROCEDURE — 82803 BLOOD GASES ANY COMBINATION: CPT

## 2021-06-23 PROCEDURE — 94760 N-INVAS EAR/PLS OXIMETRY 1: CPT

## 2021-06-23 PROCEDURE — 71250 CT THORAX DX C-: CPT

## 2021-06-23 PROCEDURE — 94799 UNLISTED PULMONARY SVC/PX: CPT

## 2021-06-23 PROCEDURE — 74176 CT ABD & PELVIS W/O CONTRAST: CPT

## 2021-06-23 PROCEDURE — 85025 COMPLETE CBC W/AUTO DIFF WBC: CPT

## 2021-06-23 PROCEDURE — 96374 THER/PROPH/DIAG INJ IV PUSH: CPT

## 2021-06-23 PROCEDURE — 83735 ASSAY OF MAGNESIUM: CPT

## 2021-06-23 PROCEDURE — U0003: CPT

## 2021-06-23 PROCEDURE — 83036 HEMOGLOBIN GLYCOSYLATED A1C: CPT

## 2021-06-23 PROCEDURE — 85027 COMPLETE CBC AUTOMATED: CPT

## 2021-06-23 PROCEDURE — 87040 BLOOD CULTURE FOR BACTERIA: CPT

## 2021-06-23 PROCEDURE — 71045 X-RAY EXAM CHEST 1 VIEW: CPT

## 2021-06-23 PROCEDURE — 94002 VENT MGMT INPAT INIT DAY: CPT

## 2021-06-23 PROCEDURE — 87641 MR-STAPH DNA AMP PROBE: CPT

## 2021-06-23 PROCEDURE — 87449 NOS EACH ORGANISM AG IA: CPT

## 2021-06-23 PROCEDURE — 99231 SBSQ HOSP IP/OBS SF/LOW 25: CPT

## 2021-06-23 PROCEDURE — 36415 COLL VENOUS BLD VENIPUNCTURE: CPT

## 2021-06-23 PROCEDURE — 80053 COMPREHEN METABOLIC PANEL: CPT

## 2021-06-23 PROCEDURE — 99233 SBSQ HOSP IP/OBS HIGH 50: CPT | Mod: GC

## 2021-06-23 PROCEDURE — 87086 URINE CULTURE/COLONY COUNT: CPT

## 2021-06-23 PROCEDURE — 99285 EMERGENCY DEPT VISIT HI MDM: CPT

## 2021-06-23 PROCEDURE — 84484 ASSAY OF TROPONIN QUANT: CPT

## 2021-06-23 PROCEDURE — 84100 ASSAY OF PHOSPHORUS: CPT

## 2021-06-23 PROCEDURE — 86769 SARS-COV-2 COVID-19 ANTIBODY: CPT

## 2021-06-23 PROCEDURE — 87640 STAPH A DNA AMP PROBE: CPT

## 2021-06-23 PROCEDURE — U0005: CPT

## 2021-06-23 PROCEDURE — 93005 ELECTROCARDIOGRAM TRACING: CPT

## 2021-06-23 PROCEDURE — 94003 VENT MGMT INPAT SUBQ DAY: CPT

## 2021-06-23 PROCEDURE — 99221 1ST HOSP IP/OBS SF/LOW 40: CPT

## 2021-06-23 PROCEDURE — 82962 GLUCOSE BLOOD TEST: CPT

## 2021-06-23 PROCEDURE — 87070 CULTURE OTHR SPECIMN AEROBIC: CPT

## 2021-06-23 PROCEDURE — 93306 TTE W/DOPPLER COMPLETE: CPT

## 2021-06-23 PROCEDURE — 81001 URINALYSIS AUTO W/SCOPE: CPT

## 2021-06-23 PROCEDURE — 94640 AIRWAY INHALATION TREATMENT: CPT

## 2021-06-23 PROCEDURE — 0225U NFCT DS DNA&RNA 21 SARSCOV2: CPT

## 2021-06-23 PROCEDURE — 83605 ASSAY OF LACTIC ACID: CPT

## 2021-06-23 RX ORDER — METFORMIN HYDROCHLORIDE 850 MG/1
1 TABLET ORAL
Qty: 0 | Refills: 0 | DISCHARGE

## 2021-06-23 RX ORDER — MEROPENEM 1 G/30ML
1000 INJECTION INTRAVENOUS
Qty: 0 | Refills: 0 | DISCHARGE
Start: 2021-06-23

## 2021-06-23 RX ORDER — PSYLLIUM SEED (WITH DEXTROSE)
17 POWDER (GRAM) ORAL
Qty: 0 | Refills: 0 | DISCHARGE

## 2021-06-23 RX ORDER — POLYETHYLENE GLYCOL 3350 17 G/17G
17 POWDER, FOR SOLUTION ORAL
Qty: 0 | Refills: 0 | DISCHARGE
Start: 2021-06-23

## 2021-06-23 RX ORDER — FENTANYL CITRATE 50 UG/ML
1 INJECTION INTRAVENOUS
Qty: 0 | Refills: 0 | DISCHARGE
Start: 2021-06-23

## 2021-06-23 RX ORDER — SENNA PLUS 8.6 MG/1
3 TABLET ORAL
Qty: 0 | Refills: 0 | DISCHARGE
Start: 2021-06-23

## 2021-06-23 RX ORDER — PANTOPRAZOLE SODIUM 20 MG/1
1 TABLET, DELAYED RELEASE ORAL
Qty: 0 | Refills: 0 | DISCHARGE

## 2021-06-23 RX ORDER — ATROPINE SULFATE 1 %
0 DROPS OPHTHALMIC (EYE)
Qty: 0 | Refills: 0 | DISCHARGE

## 2021-06-23 RX ORDER — INSULIN LISPRO 100/ML
0 VIAL (ML) SUBCUTANEOUS
Qty: 0 | Refills: 0 | DISCHARGE
Start: 2021-06-23

## 2021-06-23 RX ORDER — METOPROLOL TARTRATE 50 MG
0.5 TABLET ORAL
Qty: 0 | Refills: 0 | DISCHARGE

## 2021-06-23 RX ORDER — DOCUSATE SODIUM 100 MG
75 CAPSULE ORAL
Qty: 0 | Refills: 0 | DISCHARGE

## 2021-06-23 RX ORDER — ALBUTEROL 90 UG/1
2 AEROSOL, METERED ORAL
Qty: 0 | Refills: 0 | DISCHARGE
Start: 2021-06-23

## 2021-06-23 RX ORDER — ACETAMINOPHEN 500 MG
20.31 TABLET ORAL
Qty: 0 | Refills: 0 | DISCHARGE
Start: 2021-06-23

## 2021-06-23 RX ORDER — SENNA PLUS 8.6 MG/1
1 TABLET ORAL
Qty: 0 | Refills: 0 | DISCHARGE
Start: 2021-06-23

## 2021-06-23 RX ORDER — POLYETHYLENE GLYCOL 3350 17 G/17G
17 POWDER, FOR SOLUTION ORAL
Qty: 0 | Refills: 0 | DISCHARGE

## 2021-06-23 RX ADMIN — ALBUTEROL 2 PUFF(S): 90 AEROSOL, METERED ORAL at 08:07

## 2021-06-23 RX ADMIN — CASPOFUNGIN ACETATE 260 MILLIGRAM(S): 7 INJECTION, POWDER, LYOPHILIZED, FOR SOLUTION INTRAVENOUS at 10:15

## 2021-06-23 RX ADMIN — POLYETHYLENE GLYCOL 3350 17 GRAM(S): 17 POWDER, FOR SOLUTION ORAL at 05:11

## 2021-06-23 RX ADMIN — Medication 1 PUFF(S): at 02:03

## 2021-06-23 RX ADMIN — PANTOPRAZOLE SODIUM 40 MILLIGRAM(S): 20 TABLET, DELAYED RELEASE ORAL at 05:11

## 2021-06-23 RX ADMIN — Medication 1 PUFF(S): at 08:06

## 2021-06-23 RX ADMIN — Medication 1 MILLIGRAM(S): at 05:11

## 2021-06-23 RX ADMIN — Medication 1 GRAM(S): at 05:12

## 2021-06-23 RX ADMIN — MEROPENEM 100 MILLIGRAM(S): 1 INJECTION INTRAVENOUS at 05:12

## 2021-06-23 RX ADMIN — CHLORHEXIDINE GLUCONATE 1 APPLICATION(S): 213 SOLUTION TOPICAL at 05:13

## 2021-06-23 RX ADMIN — Medication 2 PACKET(S): at 11:37

## 2021-06-23 RX ADMIN — ENOXAPARIN SODIUM 40 MILLIGRAM(S): 100 INJECTION SUBCUTANEOUS at 11:39

## 2021-06-23 RX ADMIN — ALBUTEROL 2 PUFF(S): 90 AEROSOL, METERED ORAL at 02:03

## 2021-06-23 RX ADMIN — Medication 81 MILLIGRAM(S): at 11:52

## 2021-06-23 RX ADMIN — Medication 1 GRAM(S): at 00:03

## 2021-06-23 NOTE — PROGRESS NOTE ADULT - ASSESSMENT
Poss PLAN FOR [facility] DC   remains on Antimicrobial regimen  pt remains full code  ID and CCM notes follow up reviewed      76 yo female hx of aphasic stroke, covid 19, trach/vented, dm CARLENE from [facility] for respiratory distress, found to have fever here 101.8  sepsis eval in progress  on ABX - cx in progress - on Caspo and Meropenem -   supportive ICU measures  vent support  not a candidate for weaning  spoke with   pt is full code  pain rx regimen - Fentanyl - Opioids

## 2021-06-23 NOTE — PROGRESS NOTE ADULT - TIME BILLING
in direct care of patient , reviewing labs and other results , adjusting medications and discussion with RN , PMD and other consultants .
in direct care of patient , reviewing labs and other results and adjusting medications and discussion with other consultants , RN and PMD
in direct care of patient , reviewing labs , other results , adjusting medications and discussion with RN ,PMD andother consultants .
in direct care of patient , reviewing labs and other results and adjusting medications and discussion with other consultants , RN and PMD
in direct care of patient and reviewing labs and other results and adjusting medications and discussion with other consultants and PMD and RN .
No further stiffening/tremulous movements  No other events    Imp:   HCAP, ?Candidate UTI (no chronic Woody)   Resolved septic shock   Autonomic dysfunction vs neuro event   Hx FTD, CVA, contractures, vent dependent chronic resp failure (s/p trach)   Dysphagia, s/p PEG     Plan:   Mental status at baseline   Reviewed recent neurology notes - abnormal movements likely due to neurodegenerative process, she has had prolonged EEG w/o evidence of seizures, since these episodes respond to Ativan will continue for now, she has not had a single such episode since starting standing po ativan   BP stable - d/c midodrine   Continue vent support   On meropenem (until 6/24) and caspofungin (until 6/23)    Hx constipation - having multiple BMs, continue bowel regimen   DVT ppx with Lovenox   Prognosis extremely poor and patient should be hospice, multiple GOC conversations have been held with family who wants her to remains full code    updated at bedside  Stable for transfer to , can be discharged to NH tomorrow
No further stiffening/tremulous movements  No other events    Imp:   HCAP, ?Candidate UTI (no chronic Woody)   Resolved septic shock   Autonomic dysfunction vs neuro event   Hx FTD, CVA, contractures, vent dependent chronic resp failure (s/p trach)   Dysphagia, s/p PEG     Plan:   Mental status at baseline   Reviewed recent neurology notes - abnormal movements likely due to neurodegenerative process, she has had prolonged EEG w/o evidence of seizures, since these episodes respond to Ativan will continue for now, she has not had a single such episode since starting standing po ativan   Continue vent support   Trach positional with intermittent partial obstruction and high Pk pressure - improved after re-positioning, Pk press ~22 today   On meropenem (until 6/24) and caspofungin (until 6/23)    Hx constipation - having multiple BMs, continue bowel regimen   DVT ppx with Lovenox   Prognosis extremely poor and patient should be hospice, multiple GOC conversations have been held with family who wants her to remains full code    updated at bedside  Stable for discharge to NH
25minutes spent on this visit, 50% visit time spent in care co-ordination with other attendings ,ICU TEAM and counselling patient
25minutes spent on this visit, 50% visit time spent in care co-ordination with other attendings and counselling patient

## 2021-06-23 NOTE — PROGRESS NOTE ADULT - SUBJECTIVE AND OBJECTIVE BOX
Patient is a 75y Female whom presented to the hospital with ckd and manasa     PAST MEDICAL & SURGICAL HISTORY:  Dementia of frontal lobe type    Aphasic stroke    Diabetes mellitus    Respiratory failure    Hypertension    GERD (gastroesophageal reflux disease)    Constipation    Respiratory failure    CVA (cerebral vascular accident)    HTN (hypertension)    DM (diabetes mellitus)    Advanced dementia    COVID-19 virus detected    Quadriplegia    Pneumonia    Type II diabetes mellitus    Hx of appendectomy    Gastrostomy in place    Tracheostomy in place    Tracheostomy tube present    Feeding by G-tube        MEDICATIONS  (STANDING):  ALBUTerol    90 MICROgram(s) HFA Inhaler 2 Puff(s) Inhalation every 6 hours  aspirin  chewable 81 milliGRAM(s) Oral daily  chlorhexidine 2% Cloths 1 Application(s) Topical <User Schedule>  dextrose 40% Gel 15 Gram(s) Oral once  dextrose 5%. 1000 milliLiter(s) (50 mL/Hr) IV Continuous <Continuous>  dextrose 5%. 1000 milliLiter(s) (100 mL/Hr) IV Continuous <Continuous>  dextrose 50% Injectable 25 Gram(s) IV Push once  dextrose 50% Injectable 12.5 Gram(s) IV Push once  dextrose 50% Injectable 25 Gram(s) IV Push once  glucagon  Injectable 1 milliGRAM(s) IntraMuscular once  heparin   Injectable 5000 Unit(s) SubCutaneous every 8 hours  insulin glargine Injectable (LANTUS) 20 Unit(s) SubCutaneous at bedtime  insulin lispro (ADMELOG) corrective regimen sliding scale   SubCutaneous every 6 hours  ipratropium 17 MICROgram(s) HFA Inhaler 1 Puff(s) Inhalation every 6 hours  meropenem  IVPB      meropenem  IVPB 1000 milliGRAM(s) IV Intermittent every 12 hours  pantoprazole   Suspension 40 milliGRAM(s) Oral before breakfast  polyethylene glycol 3350 17 Gram(s) Oral daily  senna 2 Tablet(s) Oral at bedtime      Allergies    codeine (Hives)    Intolerances        SOCIAL HISTORY:  Denies ETOh,Smoking,     FAMILY HISTORY:  No pertinent family history in first degree relatives        REVIEW OF SYSTEMS:    unable to obtained a good review system                                                                                                                         10.5   13.85 )-----------( 249      ( 23 Jun 2021 08:50 )             33.9       CBC Full  -  ( 23 Jun 2021 08:50 )  WBC Count : 13.85 K/uL  RBC Count : 3.79 M/uL  Hemoglobin : 10.5 g/dL  Hematocrit : 33.9 %  Platelet Count - Automated : 249 K/uL  Mean Cell Volume : 89.4 fl  Mean Cell Hemoglobin : 27.7 pg  Mean Cell Hemoglobin Concentration : 31.0 gm/dL  Auto Neutrophil # : x  Auto Lymphocyte # : x  Auto Monocyte # : x  Auto Eosinophil # : x  Auto Basophil # : x  Auto Neutrophil % : x  Auto Lymphocyte % : x  Auto Monocyte % : x  Auto Eosinophil % : x  Auto Basophil % : x      06-23    143  |  110<H>  |  20  ----------------------------<  126<H>  5.0   |  27  |  0.85    Ca    9.1      23 Jun 2021 08:50  Phos  3.1     06-23  Mg     2.4     06-23    TPro  7.1  /  Alb  2.8<L>  /  TBili  0.6  /  DBili  x   /  AST  29  /  ALT  57  /  AlkPhos  109  06-23      CAPILLARY BLOOD GLUCOSE      POCT Blood Glucose.: 133 mg/dL (23 Jun 2021 11:37)  POCT Blood Glucose.: 132 mg/dL (23 Jun 2021 05:15)  POCT Blood Glucose.: 136 mg/dL (22 Jun 2021 23:59)  POCT Blood Glucose.: 174 mg/dL (22 Jun 2021 18:42)      Vital Signs Last 24 Hrs  T(C): 37 (23 Jun 2021 07:06), Max: 37.4 (22 Jun 2021 20:10)  T(F): 98.6 (23 Jun 2021 07:06), Max: 99.3 (22 Jun 2021 20:10)  HR: 92 (23 Jun 2021 11:00) (67 - 92)  BP: 159/66 (23 Jun 2021 11:00) (64/38 - 160/64)  BP(mean): 95 (23 Jun 2021 11:00) (46 - 100)  RR: 51 (23 Jun 2021 11:00) (14 - 51)  SpO2: 96% (23 Jun 2021 12:00) (91% - 100%)        PT/INR - ( 22 Jun 2021 05:29 )   PT: 11.3 sec;   INR: 0.96 ratio         PTT - ( 22 Jun 2021 05:29 )  PTT:33.0 sec                    PHYSICAL EXAM:    Constitutional: NAD  Neck:  No JVD  Respiratory: decrease bs b/l , pos trach   Cardiovascular: S1 and S2  Gastrointestinal: BS+, soft, pos peg   Extremities: No peripheral edema

## 2021-06-23 NOTE — PROGRESS NOTE ADULT - ASSESSMENT
74F PMH Frontotemporal dementia, CVA, chronic vent dependent respiratory failure, dysphagia s/p PEG, recurrent UTI/VAP presents with septic shock from [facility] facility. Source most likely urine, r/o PNA as well.     Neuro  - Baseline dementia from previous notes, unable to follow commands, but awake and alert.   - continue Fentanyl patch  - continue Ativan 1mg TID standing with no reoccurring episodes of abnormal movements    CV  -Hypotension 2/2 sepsis now resolved  -Episode of hypotension O/N requiring short course of levo, likely 2/2 ativan for rigors, tachypnea, agitation, afebrile at the time.  -now off levophed ggt  -Midodrine dced  -BPs stable in 120s-140s    Pulm  r/o PNA  - Hx of VAP with Pseudomonal resistant to Zosyn but sensitive to Meropenem   - CT chest 6/15 shows Nonspecific patchy airspace opacities in the lower lobes which can be on an infectious basis or possibly related to aspiration. Residual trace left pleural effusion  - Tolerating vent settings well  - Home dose inhalers   - Sputum cultures shows normal alejandro, but rare gram neg rods.  - C/w Meropenem, complete 10 day course last day to be on 6/24    GI  - continue PEG tube feeds  - continue bowel regimen senna and miralax, and Dulcolax to regimen    Renal  RYAN resolved  - Likely 2/2 hypotension in the setting of sepsis  - Trend renal indices    ID  Septic shock, h/o proteus mirabilis indol neg in urine and pseudomonas resistant to Zosyn  - MRSA negative   - BCx 6/15 NGTD, rpt 6/17 NGTD  - UCx Candida albicans  - Sputum cultures shows normal alejandro, but rare gram neg rods.  - Continue Meropenem until 6/24  - Fungitell negative but will keep Caspofungin 7 day course, last dose today 6/23    Heme  - DVT ppx, continue lovenox    Endo  Type 2 DM  - corrective scale  - continue Lantus 20qhs     Dispo: Remains full code  Transfer to Floors or DC to long term care facility if bed is available   Discussed with SW   Marciano Daily updated by attending Dr. Valenzuela at bedside 74F PMH Frontotemporal dementia, CVA, chronic vent dependent respiratory failure, dysphagia s/p PEG, recurrent UTI/VAP presents with septic shock from [facility] facility. Source most likely urine, r/o PNA as well.     Neuro  - Baseline dementia from previous notes, unable to follow commands, but awake and alert.   - continue Fentanyl patch  - continue Ativan 1mg TID standing with no reoccurring episodes of abnormal movements    CV  -Hypotension 2/2 sepsis now resolved  -Episode of hypotension O/N requiring short course of levo, likely 2/2 ativan for rigors, tachypnea, agitation, afebrile at the time.  -now off levophed ggt  -Midodrine dced  -BPs stable in 120s-140s    Pulm  r/o PNA  - Hx of VAP with Pseudomonal resistant to Zosyn but sensitive to Meropenem   - CT chest 6/15 shows Nonspecific patchy airspace opacities in the lower lobes which can be on an infectious basis or possibly related to aspiration. Residual trace left pleural effusion  - Tolerating vent settings well  - Home dose inhalers   - Sputum cultures shows normal alejandro, but rare gram neg rods.  - C/w Meropenem, complete 10 day course last day to be tomorrow 6/24    GI  - continue PEG tube feeds  - continue bowel regimen senna and miralax, and Dulcolax to regimen    Renal  RYAN resolved  - Likely 2/2 hypotension in the setting of sepsis  - Trend renal indices    ID  Septic shock, h/o proteus mirabilis indol neg in urine and pseudomonas resistant to Zosyn  - MRSA negative   - BCx 6/15 NGTD, rpt 6/17 NGTD  - UCx Candida albicans  - Sputum cultures shows normal alejandro, but rare gram neg rods.  - Continue Meropenem until 6/24  - Fungitell negative but will keep Caspofungin 7 day course, last dose today 6/23    Heme  - DVT ppx, continue lovenox    Endo  Type 2 DM  - corrective scale  - continue Lantus 20qhs     Dispo: Remains full code  DC to long term care facility today  Discussed with SW   Marciano Daily updated by attending Dr. Valenzuela at bedside 74F PMH Frontotemporal dementia, CVA, chronic vent dependent respiratory failure, dysphagia s/p PEG, recurrent UTI/VAP presents with septic shock from [facility] facility. Source most likely urine, r/o PNA as well.     Neuro  - Baseline dementia from previous notes, unable to follow commands, but awake and alert.   - continue Fentanyl patch  - continue Ativan 1mg TID standing with no reoccurring episodes of abnormal movements    CV  -Hypotension 2/2 sepsis now resolved  -Episode of hypotension O/N requiring short course of levo, likely 2/2 ativan for rigors, tachypnea, agitation, afebrile at the time.  -now off levophed ggt  -Midodrine dced  -BPs stable in 120s-140s    Pulm  r/o PNA  - Hx of VAP with Pseudomonal resistant to Zosyn but sensitive to Meropenem   - CT chest 6/15 shows Nonspecific patchy airspace opacities in the lower lobes which can be on an infectious basis or possibly related to aspiration. Residual trace left pleural effusion  - Tolerating vent settings well  - Home dose inhalers   - Sputum cultures shows normal alejandro, but rare gram neg rods.  - C/w Meropenem, complete 10 day course last day to be tomorrow 6/24    GI  - continue PEG tube feeds  - continue bowel regimen senna and miralax, and Dulcolax to regimen    Renal  RYAN resolved  - Likely 2/2 hypotension in the setting of sepsis  - Trend renal indices    ID  Septic shock, h/o proteus mirabilis indol neg in urine and pseudomonas resistant to Zosyn  - MRSA negative   - BCx 6/15 NGTD, rpt 6/17 NGTD  - UCx Candida albicans  - Sputum cultures shows normal alejandro, but rare gram neg rods.  - Continue Meropenem until 6/24  - Fungitell negative but will keep Caspofungin 7 day course, last dose today 6/23    Heme  - DVT ppx, continue lovenox    Endo  Type 2 DM  - corrective scale  - continue Lantus 20qhs     Dispo: Remains full code  DC to long term care facility today  Discussed with ANETTE

## 2021-06-23 NOTE — PROGRESS NOTE ADULT - PROBLEM SELECTOR PROBLEM 9
Prophylactic measure
GERD (gastroesophageal reflux disease)
GERD (gastroesophageal reflux disease)
Prophylactic measure
GERD (gastroesophageal reflux disease)
Prophylactic measure
GERD (gastroesophageal reflux disease)
GERD (gastroesophageal reflux disease)

## 2021-06-23 NOTE — PROGRESS NOTE ADULT - PROBLEM/PLAN-8
DISPLAY PLAN FREE TEXT
None

## 2021-06-23 NOTE — PROGRESS NOTE ADULT - PROBLEM SELECTOR PLAN 8
supportive care , continue home medications & current management

## 2021-06-23 NOTE — PROGRESS NOTE ADULT - ASSESSMENT
Pt is a 75W from [facility], w/ PMHx of frontotemporal dementia, CVA, CHRF requiring vent, dysphagia s/p PEG, recurrent UTI/VAP p/w septic shock 2/2 UTI vs PNA.    RECOMMENDATIONS  Septic Shock 2/2 recurrent VAP vs recurrent UTI  Candiduria  Shock/Hypotension  Prior micro with Pseudomonas aeruginosa -  Piperacillin/Tazobactam: R >64 in sputum, Proteus mirabilis (Indole negative) in urine  6/15 RCx NOF  6/15 UCx +candida albicans - ?colonization vs UTI  6/18 BCx NGTD  MRSA screen negative  Fungitell negative   Trend temps/CBC  Pulmonary toilet  Appreciate ICU care  -c/w meropenem (6/15-) - can plan for x10 day course to complete 6/24  -c/w caspofungin (added on 6/17) - can plan to complete 7 day course for possible UTI until 6/23    -discussed with ICU team

## 2021-06-23 NOTE — PROGRESS NOTE ADULT - SUBJECTIVE AND OBJECTIVE BOX
PROGRESS NOTE  Patient is a 75y old  Female who presents with a chief complaint of fever and dyspnea (23 Jun 2021 08:21)    Chart and available morning labs /imaging are reviewed electronically , urgent issues addressed . More information  is being added upon completion of rounds , when more information is collected and management discussed with consultants , medical staff and social service/case management on the floor   OVERNIGHT  No new issues reported by medical staff . All above noted   Discharge back to [facility] planned for today with one more day of Meropenem completing Caspofungin today   HPI:  76 yo Female ,[facility] resident with hx of  PMH Frontotemporal dementia, CVA, chronic vent dependent respiratory failure, dysphagia s/p PEG, recurrent UTI/VAP presents with septic shock from [facility] facility. She was last admitted at [facility] 10/ 2020 with proteus UTI ,sepsis ,hypernatremia and RYAN .Pseudomonas grew in sputum resistant to zosyn last time Admitted for septic workup and evaluation,send blood and urine cx,serial lactate levels,monitor vitals closley,ivfs hydration,monitor urine output and renal profile,iv abx initiated e. On arrival to ER found to be febrile to 102, WBC 20k, lactate 6.7. CXR with haziness of entire right side, urine appears cloudy. Med hx is significant for Advanced dementia ,s/p  Aphasic stroke , Constipation  ,COVID-19   ,CVA (cerebral vascular accident)  ,Dementia of frontal lobe type  ,Diabetes mellitus  ,DM (diabetes mellitus)type  2   ,GERD (gastroesophageal reflux disease)  ,HTN (hypertension)  ,Hypertension  ,Pneumonia  ,Quadriplegia  ,Respiratory failure ,s/p tracheostomy and peg Palliative care consult requested ,to discuss advance directives and complete /update  MOLST  (15 Zay 2021 07:06)    PAST MEDICAL & SURGICAL HISTORY:  Dementia of frontal lobe type    Aphasic stroke    Diabetes mellitus    Respiratory failure    Hypertension    GERD (gastroesophageal reflux disease)    Constipation    Respiratory failure    CVA (cerebral vascular accident)    HTN (hypertension)    DM (diabetes mellitus)    Advanced dementia    COVID-19 virus detected    Quadriplegia    Pneumonia    Type II diabetes mellitus    Hx of appendectomy    Gastrostomy in place    Tracheostomy in place    Tracheostomy tube present    Feeding by G-tube        MEDICATIONS  (STANDING):  ALBUTerol    90 MICROgram(s) HFA Inhaler 2 Puff(s) Inhalation every 6 hours  aspirin  chewable 81 milliGRAM(s) Oral daily  bisacodyl 5 milliGRAM(s) Oral at bedtime  caspofungin IVPB      caspofungin IVPB 50 milliGRAM(s) IV Intermittent every 24 hours  chlorhexidine 2% Cloths 1 Application(s) Topical <User Schedule>  dextrose 40% Gel 15 Gram(s) Oral once  dextrose 5%. 1000 milliLiter(s) (50 mL/Hr) IV Continuous <Continuous>  dextrose 5%. 1000 milliLiter(s) (100 mL/Hr) IV Continuous <Continuous>  dextrose 50% Injectable 25 Gram(s) IV Push once  dextrose 50% Injectable 12.5 Gram(s) IV Push once  dextrose 50% Injectable 25 Gram(s) IV Push once  enoxaparin Injectable 40 milliGRAM(s) SubCutaneous daily  glucagon  Injectable 1 milliGRAM(s) IntraMuscular once  insulin glargine Injectable (LANTUS) 20 Unit(s) SubCutaneous at bedtime  insulin lispro (ADMELOG) corrective regimen sliding scale   SubCutaneous every 6 hours  ipratropium 17 MICROgram(s) HFA Inhaler 1 Puff(s) Inhalation every 6 hours  LORazepam     Tablet 1 milliGRAM(s) Oral every 8 hours  meropenem  IVPB 1000 milliGRAM(s) IV Intermittent every 8 hours  norepinephrine Infusion 0.05 MICROgram(s)/kG/Min (4.97 mL/Hr) IV Continuous <Continuous>  pantoprazole   Suspension 40 milliGRAM(s) Oral before breakfast  polyethylene glycol 3350 17 Gram(s) Oral every 12 hours  potassium phosphate / sodium phosphate Powder (PHOS-NaK) 2 Packet(s) Oral daily  senna 2 Tablet(s) Oral at bedtime  sucralfate suspension 1 Gram(s) Oral every 6 hours    MEDICATIONS  (PRN):  acetaminophen    Suspension .. 650 milliGRAM(s) Oral every 6 hours PRN Temp greater or equal to 38C (100.4F)      OBJECTIVE    T(C): 37 (06-23-21 @ 07:06), Max: 37.4 (06-22-21 @ 20:10)  HR: 84 (06-23-21 @ 08:12) (65 - 104)  BP: 129/63 (06-23-21 @ 07:00) (64/38 - 198/84)  RR: 23 (06-23-21 @ 07:00) (14 - 43)  SpO2: 93% (06-23-21 @ 08:12) (92% - 100%)  Wt(kg): --  I&O's Summary    22 Jun 2021 07:01  -  23 Jun 2021 07:00  --------------------------------------------------------  IN: 1270 mL / OUT: 0 mL / NET: 1270 mL          REVIEW OF SYSTEMS:  Patient is  unable to provide any information/ROS  due to baseline mental status.     PHYSICAL EXAM:  Appearance: NAD. VS past 24 hrs -as above   HEENT:   Moist oral mucosa. Conjunctiva clear b/l.   Neck : supple trach   Respiratory: Lungs CTAB.  Gastrointestinal:  Soft, nontender. No rebound. No rigidity. BS present	peg  Cardiovascular: RRR ,S1S2 present  Neurologic: Non-focal. Moving all extremities.  Extremities: No edema. No erythema. No calf tenderness.  Skin: No rashes, No ecchymoses, No cyanosis.	  wounds ,skin lesions-See skin assesment flow sheet   LABS:                        10.5   13.85 )-----------( 249      ( 23 Jun 2021 08:50 )             33.9     06-22    141  |  109<H>  |  19  ----------------------------<  119<H>  4.3   |  30  |  0.73    Ca    9.2      22 Jun 2021 05:29  Phos  2.4     06-22  Mg     2.6     06-22    TPro  7.6  /  Alb  3.0<L>  /  TBili  0.5  /  DBili  x   /  AST  52<H>  /  ALT  79<H>  /  AlkPhos  110  06-22    CAPILLARY BLOOD GLUCOSE      POCT Blood Glucose.: 132 mg/dL (23 Jun 2021 05:15)  POCT Blood Glucose.: 136 mg/dL (22 Jun 2021 23:59)  POCT Blood Glucose.: 174 mg/dL (22 Jun 2021 18:42)  POCT Blood Glucose.: 129 mg/dL (22 Jun 2021 12:12)    PT/INR - ( 22 Jun 2021 05:29 )   PT: 11.3 sec;   INR: 0.96 ratio         PTT - ( 22 Jun 2021 05:29 )  PTT:33.0 sec      Culture - Blood (collected 18 Jun 2021 01:07)  Source: .Blood Blood-Venous  Final Report (23 Jun 2021 02:01):    No Growth Final    Culture - Blood (collected 18 Jun 2021 01:07)  Source: .Blood Blood-Peripheral  Final Report (23 Jun 2021 02:01):    No Growth Final    Culture - Urine (collected 15 Zay 2021 16:42)  Source: .Urine Catheterized  Final Report (16 Jun 2021 21:47):    50,000 - 99,000 CFU/mL Presumptive Candida albicans    "Susceptibilities not performed"    Culture - Sputum (collected 15 Zay 2021 16:28)  Source: .Sputum Sputum  Gram Stain (15 Zay 2021 18:57):    Few polymorphonuclear leukocytes per low power field    Few Squamous epithelial cells per low power field    Rare Gram Negative Rods per oil power field  Final Report (17 Jun 2021 17:45):    Normal Respiratory Elvira present    Culture - Blood (collected 15 Zay 2021 04:41)  Source: .Blood Blood-Peripheral  Final Report (20 Jun 2021 05:00):    No Growth Final    Culture - Blood (collected 15 Zay 2021 04:41)  Source: .Blood Blood-Peripheral  Final Report (20 Jun 2021 05:00):    No Growth Final      RADIOLOGY & ADDITIONAL TESTS:   reviewed elctronically  ASSESSMENT/PLAN:

## 2021-06-23 NOTE — PROGRESS NOTE ADULT - NSPROGADDITIONALINFOA_GEN_ALL_CORE
Downgraded to 1 EAST vs return to [facility] on iv abx /antifungals .Social service input .
Discharge to [facility] when arranged by SW

## 2021-06-23 NOTE — PROGRESS NOTE ADULT - SUBJECTIVE AND OBJECTIVE BOX
Patient is a 75y old  Female who presents with a chief complaint of fever and dyspnea (23 Jun 2021 06:15)    CHARTING IN PROGRESS    24 hour events: No acute events overnight. Patient seen and examined at bedside. No reocurring episodes of abnormal movements since starting Ativan.    REVIEW OF SYSTEMS  Unable to obtain 2/2 mental status    T(F): 98.6 (06-23-21 @ 07:06), Max: 99.3 (06-22-21 @ 20:10)  HR: 84 (06-23-21 @ 08:12) (65 - 104)  BP: 129/63 (06-23-21 @ 07:00) (64/38 - 198/84)  RR: 23 (06-23-21 @ 07:00) (14 - 43)  SpO2: 93% (06-23-21 @ 08:12) (92% - 100%)  Wt(kg): --    Mode: AC/ CMV (Assist Control/ Continuous Mandatory Ventilation), RR (machine): 14, TV (machine): 400, FiO2: 21, PEEP: 5        I&O's Summary    06-22 @ 07:01  -  06-23 @ 07:00  --------------------------------------------------------  IN: 1270 mL / OUT: 0 mL / NET: 1270 mL      PHYSICAL EXAM  General: Elderly, chronic trach to vent, stable appearing  HEENT: Pupils equal, reactive to light. Symmetric  PULM: Clear to auscultation bilaterally, no significant sputum production. No wheezes or rales.  CVS: Regular rate and rhythm, no murmurs, rubs, or gallops  ABD: Mild distention. Soft,, normoactive bowel sounds, no guarding. +PEG in place, c/d/i  EXT: No edema, nontender  SKIN: Warm and dry   NEURO: opens eyes to voice, awake but does not follow command, contracted UE > LE    MEDICATIONS  caspofungin IVPB   caspofungin IVPB IV Intermittent  meropenem  IVPB IV Intermittent    norepinephrine Infusion IV Continuous    dextrose 40% Gel Oral  dextrose 50% Injectable IV Push  dextrose 50% Injectable IV Push  dextrose 50% Injectable IV Push  glucagon  Injectable IntraMuscular  insulin glargine Injectable (LANTUS) SubCutaneous  insulin lispro (ADMELOG) corrective regimen sliding scale SubCutaneous    ALBUTerol    90 MICROgram(s) HFA Inhaler Inhalation  ipratropium 17 MICROgram(s) HFA Inhaler Inhalation    acetaminophen    Suspension .. Oral PRN  LORazepam     Tablet Oral      aspirin  chewable Oral  enoxaparin Injectable SubCutaneous    bisacodyl Oral  pantoprazole   Suspension Oral  polyethylene glycol 3350 Oral  senna Oral  sucralfate suspension Oral      dextrose 5%. IV Continuous  dextrose 5%. IV Continuous  potassium phosphate / sodium phosphate Powder (PHOS-NaK) Oral      chlorhexidine 2% Cloths Topical                            11.5   7.70  )-----------( 283      ( 22 Jun 2021 05:29 )             36.8       06-22    141  |  109<H>  |  19  ----------------------------<  119<H>  4.3   |  30  |  0.73    Ca    9.2      22 Jun 2021 05:29  Phos  2.4     06-22  Mg     2.6     06-22    TPro  7.6  /  Alb  3.0<L>  /  TBili  0.5  /  DBili  x   /  AST  52<H>  /  ALT  79<H>  /  AlkPhos  110  06-22          PT/INR - ( 22 Jun 2021 05:29 )   PT: 11.3 sec;   INR: 0.96 ratio         PTT - ( 22 Jun 2021 05:29 )  PTT:33.0 sec          Radiology: No new imaging      CENTRAL LINE: N           REID: N                 A-LINE: N                          GLOBAL ISSUE/BEST PRACTICE  Analgesia: N  Sedation: N  CAM-ICU: Y  HOB elevation: yes  Stress ulcer prophylaxis: Y  VTE prophylaxis: Y  Glycemic control: Y  Nutrition: Y    CODE STATUS: FULL  GOC discussion Y       Patient is a 75y old  Female who presents with a chief complaint of fever and dyspnea (23 Jun 2021 06:15)    24 hour events: No acute events overnight. Patient seen and examined at bedside. No reocurring episodes of abnormal movements since starting Ativan. DC today.    REVIEW OF SYSTEMS  Unable to obtain 2/2 mental status    T(F): 98.6 (06-23-21 @ 07:06), Max: 99.3 (06-22-21 @ 20:10)  HR: 84 (06-23-21 @ 08:12) (65 - 104)  BP: 129/63 (06-23-21 @ 07:00) (64/38 - 198/84)  RR: 23 (06-23-21 @ 07:00) (14 - 43)  SpO2: 93% (06-23-21 @ 08:12) (92% - 100%)  Wt(kg): --    Mode: AC/ CMV (Assist Control/ Continuous Mandatory Ventilation), RR (machine): 14, TV (machine): 400, FiO2: 21, PEEP: 5        I&O's Summary    06-22 @ 07:01  -  06-23 @ 07:00  --------------------------------------------------------  IN: 1270 mL / OUT: 0 mL / NET: 1270 mL      PHYSICAL EXAM  General: Elderly, chronic trach to vent, stable appearing  HEENT: Pupils equal, reactive to light. Symmetric  PULM: Clear to auscultation bilaterally, no significant sputum production. No wheezes or rales.  CVS: Regular rate and rhythm, no murmurs, rubs, or gallops  ABD: Mild distention. Soft,, normoactive bowel sounds, no guarding. +PEG in place, c/d/i  EXT: No edema, nontender  SKIN: Warm and dry   NEURO: opens eyes to voice, awake but does not follow command, contracted UE > LE    MEDICATIONS  caspofungin IVPB   caspofungin IVPB IV Intermittent  meropenem  IVPB IV Intermittent    norepinephrine Infusion IV Continuous    dextrose 40% Gel Oral  dextrose 50% Injectable IV Push  dextrose 50% Injectable IV Push  dextrose 50% Injectable IV Push  glucagon  Injectable IntraMuscular  insulin glargine Injectable (LANTUS) SubCutaneous  insulin lispro (ADMELOG) corrective regimen sliding scale SubCutaneous    ALBUTerol    90 MICROgram(s) HFA Inhaler Inhalation  ipratropium 17 MICROgram(s) HFA Inhaler Inhalation    acetaminophen    Suspension .. Oral PRN  LORazepam     Tablet Oral      aspirin  chewable Oral  enoxaparin Injectable SubCutaneous    bisacodyl Oral  pantoprazole   Suspension Oral  polyethylene glycol 3350 Oral  senna Oral  sucralfate suspension Oral      dextrose 5%. IV Continuous  dextrose 5%. IV Continuous  potassium phosphate / sodium phosphate Powder (PHOS-NaK) Oral      chlorhexidine 2% Cloths Topical                            11.5   7.70  )-----------( 283      ( 22 Jun 2021 05:29 )             36.8       06-22    141  |  109<H>  |  19  ----------------------------<  119<H>  4.3   |  30  |  0.73    Ca    9.2      22 Jun 2021 05:29  Phos  2.4     06-22  Mg     2.6     06-22    TPro  7.6  /  Alb  3.0<L>  /  TBili  0.5  /  DBili  x   /  AST  52<H>  /  ALT  79<H>  /  AlkPhos  110  06-22          PT/INR - ( 22 Jun 2021 05:29 )   PT: 11.3 sec;   INR: 0.96 ratio         PTT - ( 22 Jun 2021 05:29 )  PTT:33.0 sec          Radiology: No new imaging      CENTRAL LINE: N           REID: N                 A-LINE: N                          GLOBAL ISSUE/BEST PRACTICE  Analgesia: N  Sedation: N  CAM-ICU: Y  HOB elevation: yes  Stress ulcer prophylaxis: Y  VTE prophylaxis: Y  Glycemic control: Y  Nutrition: Y    CODE STATUS: FULL  GOC discussion Y

## 2021-06-23 NOTE — PROGRESS NOTE ADULT - PROBLEM SELECTOR PROBLEM 7
Constipation
Abnormal movements
Constipation
Constipation
Abnormal movements
GERD (gastroesophageal reflux disease)

## 2021-06-23 NOTE — PROGRESS NOTE ADULT - PROBLEM SELECTOR PLAN 4
ventilator management as per ICU team & pulm
- Hx of VAP with Pseudomonal resistant to Zosyn but sensitive to Meropenem   - C/w Meropenem   - Sputum cultures shows few gram neg rods and PMNs  - CT chest 6/15 shows Nonspecific patchy airspace opacities in the lower lobes which can be on an infectious basis or possibly related to aspiration. Residual trace left pleural effusion.
- Hx of VAP with Pseudomonal resistant to Zosyn but sensitive to Meropenem   - C/w Meropenem   - Sputum cultures shows few gram neg rods and PMNs  - CT chest 6/15 shows Nonspecific patchy airspace opacities in the lower lobes which can be on an infectious basis or possibly related to aspiration. Residual trace left pleural effusion.
- C/w Meropenem   - Sputum cultures shows few gram neg rods and PMNs  - CT chest 6/15 shows Nonspecific patchy airspace opacities in the lower lobes which can be on an infectious basis or possibly related to aspiration. Residual trace left pleural effusion.
- C/w Meropenem   - Sputum cultures shows few gram neg rods and PMNs  - CT chest 6/15 shows Nonspecific patchy airspace opacities in the lower lobes which can be on an infectious basis or possibly related to aspiration. Residual trace left pleural effusion.
- Hx of VAP with Pseudomonal resistant to Zosyn but sensitive to Meropenem   - C/w Meropenem   - Sputum cultures shows few gram neg rods and PMNs  - CT chest 6/15 shows Nonspecific patchy airspace opacities in the lower lobes which can be on an infectious basis or possibly related to aspiration. Residual trace left pleural effusion.

## 2021-06-23 NOTE — PROGRESS NOTE ADULT - PROVIDER SPECIALTY LIST ADULT
Critical Care
Critical Care
Infectious Disease
Nephrology
Pulmonology
Pulmonology
Cardiology
Critical Care
Infectious Disease
Nephrology
Palliative Care
Pulmonology
Pulmonology
Cardiology
Critical Care
Infectious Disease
Infectious Disease
Nephrology
Palliative Care
Palliative Care
Pulmonology
Critical Care
Critical Care
Nephrology
Pulmonology
Cardiology
Critical Care
Infectious Disease
Infectious Disease
Nephrology
Palliative Care
Hospitalist
Nephrology
Palliative Care
Palliative Care
Hospitalist

## 2021-06-23 NOTE — PROGRESS NOTE ADULT - PROBLEM SELECTOR PLAN 2
ventilator management as per ICU team & pulm ,tolerating settings well. continue home dose inhalers
ventilator management as per ICU team & pulm ,tolerating settings well. continue home dose inhalers
as per pulm cons
ventilator management as per ICU team & pulm ,tolerating settings well. continue home dose inhalers
serial bmp ,ins/outs ,nephrology eval , iv hydration
ventilator management as per ICU team & pulm ,tolerating settings well. continue home dose inhalers
as per pulm cons
ventilator management as per ICU team & pulm ,tolerating settings well. continue home dose inhalers

## 2021-06-23 NOTE — PROGRESS NOTE ADULT - PROBLEM SELECTOR PROBLEM 4
Gram-negative pneumonia
Gram-negative pneumonia
Respiratory failure
Gram-negative pneumonia

## 2021-06-23 NOTE — PROGRESS NOTE ADULT - PROBLEM SELECTOR PROBLEM 2
Respiratory failure
RYAN (acute kidney injury)
Respiratory failure

## 2021-06-23 NOTE — PROGRESS NOTE ADULT - ASSESSMENT
74 yo Female ,[facility] resident with hx of  PMH Frontotemporal dementia, CVA, chronic vent dependent respiratory failure, dysphagia s/p PEG, recurrent UTI/VAP presents with septic shock from [facility] facility. She was last admitted at [facility] 10/ 2020 with proteus UTI ,sepsis ,hypernatremia and RYAN .Pseudomonas grew in sputum resistant to zosyn last time Admitted for septic workup and evaluation,send blood and urine cx,serial lactate levels,monitor vitals closley,ivfs hydration,monitor urine output and renal profile,iv abx initiated e. On arrival to ER found to be febrile to 102, WBC 20k, lactate 6.7. CXR with haziness of entire right side, urine appears cloudy. Med hx is significant for Advanced dementia ,s/p  Aphasic stroke , Constipation  ,COVID-19   ,CVA (cerebral vascular accident)  ,Dementia of frontal lobe type  ,Diabetes mellitus  ,DM (diabetes mellitus)  ,GERD (gastroesophageal reflux disease)  ,HTN (hypertension)  ,Hypertension  ,Pneumonia  ,Quadriplegia  ,Respiratory failure ,s/p tracheostomy and peg Palliative care consult requested ,to discuss advance directives and complete /update  [facility]

## 2021-06-23 NOTE — PROGRESS NOTE ADULT - PROBLEM SELECTOR PROBLEM 6
Type II diabetes mellitus
Constipation
Type II diabetes mellitus

## 2021-06-23 NOTE — PROGRESS NOTE ADULT - SUBJECTIVE AND OBJECTIVE BOX
BREA BECKHAM    [facility] ICU1 12A    Patient is a 75y old  Female who presents with a chief complaint of fever and dyspnea (22 Jun 2021 13:38)       Allergies    codeine (Hives)    Intolerances        HPI:  76 yo Female ,[facility] resident with hx of  PMH Frontotemporal dementia, CVA, chronic vent dependent respiratory failure, dysphagia s/p PEG, recurrent UTI/VAP presents with septic shock from [facility] facility. She was last admitted at [facility] 10/ 2020 with proteus UTI ,sepsis ,hypernatremia and RYAN .Pseudomonas grew in sputum resistant to zosyn last time Admitted for septic workup and evaluation,send blood and urine cx,serial lactate levels,monitor vitals closley,ivfs hydration,monitor urine output and renal profile,iv abx initiated e. On arrival to ER found to be febrile to 102, WBC 20k, lactate 6.7. CXR with haziness of entire right side, urine appears cloudy. Med hx is significant for Advanced dementia ,s/p  Aphasic stroke , Constipation  ,COVID-19   ,CVA (cerebral vascular accident)  ,Dementia of frontal lobe type  ,Diabetes mellitus  ,DM (diabetes mellitus)type  2   ,GERD (gastroesophageal reflux disease)  ,HTN (hypertension)  ,Hypertension  ,Pneumonia  ,Quadriplegia  ,Respiratory failure ,s/p tracheostomy and peg Palliative care consult requested ,to discuss advance directives and complete /update  MOLST  (15 Zay 2021 07:06)      PAST MEDICAL & SURGICAL HISTORY:  Dementia of frontal lobe type    Aphasic stroke    Diabetes mellitus    Respiratory failure    Hypertension    GERD (gastroesophageal reflux disease)    Constipation    Respiratory failure    CVA (cerebral vascular accident)    HTN (hypertension)    DM (diabetes mellitus)    Advanced dementia    COVID-19 virus detected    Quadriplegia    Pneumonia    Type II diabetes mellitus    Hx of appendectomy    Gastrostomy in place    Tracheostomy in place    Tracheostomy tube present    Feeding by G-tube        FAMILY HISTORY:  No pertinent family history in first degree relatives          MEDICATIONS   acetaminophen    Suspension .. 650 milliGRAM(s) Oral every 6 hours PRN  ALBUTerol    90 MICROgram(s) HFA Inhaler 2 Puff(s) Inhalation every 6 hours  aspirin  chewable 81 milliGRAM(s) Oral daily  bisacodyl 5 milliGRAM(s) Oral at bedtime  caspofungin IVPB      caspofungin IVPB 50 milliGRAM(s) IV Intermittent every 24 hours  chlorhexidine 2% Cloths 1 Application(s) Topical <User Schedule>  dextrose 40% Gel 15 Gram(s) Oral once  dextrose 5%. 1000 milliLiter(s) IV Continuous <Continuous>  dextrose 5%. 1000 milliLiter(s) IV Continuous <Continuous>  dextrose 50% Injectable 25 Gram(s) IV Push once  dextrose 50% Injectable 12.5 Gram(s) IV Push once  dextrose 50% Injectable 25 Gram(s) IV Push once  enoxaparin Injectable 40 milliGRAM(s) SubCutaneous daily  glucagon  Injectable 1 milliGRAM(s) IntraMuscular once  insulin glargine Injectable (LANTUS) 20 Unit(s) SubCutaneous at bedtime  insulin lispro (ADMELOG) corrective regimen sliding scale   SubCutaneous every 6 hours  ipratropium 17 MICROgram(s) HFA Inhaler 1 Puff(s) Inhalation every 6 hours  LORazepam     Tablet 1 milliGRAM(s) Oral every 8 hours  meropenem  IVPB 1000 milliGRAM(s) IV Intermittent every 8 hours  norepinephrine Infusion 0.05 MICROgram(s)/kG/Min IV Continuous <Continuous>  pantoprazole   Suspension 40 milliGRAM(s) Oral before breakfast  polyethylene glycol 3350 17 Gram(s) Oral every 12 hours  potassium phosphate / sodium phosphate Powder (PHOS-NaK) 2 Packet(s) Oral daily  senna 2 Tablet(s) Oral at bedtime  sucralfate suspension 1 Gram(s) Oral every 6 hours      Vital Signs Last 24 Hrs  T(C): 37.3 (23 Jun 2021 03:17), Max: 37.4 (22 Jun 2021 20:10)  T(F): 99.1 (23 Jun 2021 03:17), Max: 99.3 (22 Jun 2021 20:10)  HR: 80 (23 Jun 2021 05:37) (64 - 104)  BP: 119/58 (23 Jun 2021 03:00) (64/38 - 198/84)  BP(mean): 83 (23 Jun 2021 03:00) (46 - 120)  RR: 18 (23 Jun 2021 03:00) (14 - 43)  SpO2: 95% (23 Jun 2021 05:37) (92% - 100%)      06-21-21 @ 07:01  -  06-22-21 @ 07:00  --------------------------------------------------------  IN: 3170 mL / OUT: 900 mL / NET: 2270 mL    06-22-21 @ 07:01  -  06-23-21 @ 06:11  --------------------------------------------------------  IN: 770 mL / OUT: 0 mL / NET: 770 mL        Mode: AC/ CMV (Assist Control/ Continuous Mandatory Ventilation), RR (machine): 14, TV (machine): 400, FiO2: 21, PEEP: 5, ITime: 1, MAP: 13, PIP: 28    LABS:                        11.5   7.70  )-----------( 283      ( 22 Jun 2021 05:29 )             36.8     06-22    141  |  109<H>  |  19  ----------------------------<  119<H>  4.3   |  30  |  0.73    Ca    9.2      22 Jun 2021 05:29  Phos  2.4     06-22  Mg     2.6     06-22    TPro  7.6  /  Alb  3.0<L>  /  TBili  0.5  /  DBili  x   /  AST  52<H>  /  ALT  79<H>  /  AlkPhos  110  06-22    PT/INR - ( 22 Jun 2021 05:29 )   PT: 11.3 sec;   INR: 0.96 ratio         PTT - ( 22 Jun 2021 05:29 )  PTT:33.0 sec          WBC:  WBC Count: 7.70 K/uL (06-22 @ 05:29)  WBC Count: 5.84 K/uL (06-21 @ 05:51)  WBC Count: 5.38 K/uL (06-20 @ 05:34)  WBC Count: 9.62 K/uL (06-19 @ 09:27)      MICROBIOLOGY:  RECENT CULTURES:  06-18 .Blood Blood-Peripheral XXXX XXXX   No Growth Final                PT/INR - ( 22 Jun 2021 05:29 )   PT: 11.3 sec;   INR: 0.96 ratio         PTT - ( 22 Jun 2021 05:29 )  PTT:33.0 sec    Sodium:  Sodium, Serum: 141 mmol/L (06-22 @ 05:29)  Sodium, Serum: 142 mmol/L (06-21 @ 05:51)  Sodium, Serum: 143 mmol/L (06-20 @ 05:34)  Sodium, Serum: 143 mmol/L (06-19 @ 09:27)      0.73 mg/dL 06-22 @ 05:29  0.78 mg/dL 06-21 @ 05:51  0.87 mg/dL 06-20 @ 05:34  1.10 mg/dL 06-19 @ 09:27      Hemoglobin:  Hemoglobin: 11.5 g/dL (06-22 @ 05:29)  Hemoglobin: 10.2 g/dL (06-21 @ 05:51)  Hemoglobin: 9.8 g/dL (06-20 @ 05:34)  Hemoglobin: 10.7 g/dL (06-19 @ 09:27)      Platelets: Platelet Count - Automated: 283 K/uL (06-22 @ 05:29)  Platelet Count - Automated: 277 K/uL (06-21 @ 05:51)  Platelet Count - Automated: 261 K/uL (06-20 @ 05:34)  Platelet Count - Automated: 261 K/uL (06-19 @ 09:27)      LIVER FUNCTIONS - ( 22 Jun 2021 05:29 )  Alb: 3.0 g/dL / Pro: 7.6 g/dL / ALK PHOS: 110 U/L / ALT: 79 U/L / AST: 52 U/L / GGT: x                 RADIOLOGY & ADDITIONAL STUDIES:

## 2021-06-23 NOTE — PROGRESS NOTE ADULT - SUBJECTIVE AND OBJECTIVE BOX
Date/Time Patient Seen:  		  Referring MD:   Data Reviewed	       Patient is a 75y old  Female who presents with a chief complaint of fever and dyspnea (23 Jun 2021 06:11)      Subjective/HPI     PAST MEDICAL & SURGICAL HISTORY:  Dementia of frontal lobe type    Aphasic stroke    Diabetes mellitus    Respiratory failure    Hypertension    GERD (gastroesophageal reflux disease)    Constipation    Respiratory failure    CVA (cerebral vascular accident)    HTN (hypertension)    DM (diabetes mellitus)    Advanced dementia    COVID-19 virus detected    Quadriplegia    Pneumonia    Type II diabetes mellitus    Hx of appendectomy    Gastrostomy in place    Tracheostomy in place    Tracheostomy tube present    Feeding by G-tube          Medication list         MEDICATIONS  (STANDING):  ALBUTerol    90 MICROgram(s) HFA Inhaler 2 Puff(s) Inhalation every 6 hours  aspirin  chewable 81 milliGRAM(s) Oral daily  bisacodyl 5 milliGRAM(s) Oral at bedtime  caspofungin IVPB      caspofungin IVPB 50 milliGRAM(s) IV Intermittent every 24 hours  chlorhexidine 2% Cloths 1 Application(s) Topical <User Schedule>  dextrose 40% Gel 15 Gram(s) Oral once  dextrose 5%. 1000 milliLiter(s) (50 mL/Hr) IV Continuous <Continuous>  dextrose 5%. 1000 milliLiter(s) (100 mL/Hr) IV Continuous <Continuous>  dextrose 50% Injectable 25 Gram(s) IV Push once  dextrose 50% Injectable 12.5 Gram(s) IV Push once  dextrose 50% Injectable 25 Gram(s) IV Push once  enoxaparin Injectable 40 milliGRAM(s) SubCutaneous daily  glucagon  Injectable 1 milliGRAM(s) IntraMuscular once  insulin glargine Injectable (LANTUS) 20 Unit(s) SubCutaneous at bedtime  insulin lispro (ADMELOG) corrective regimen sliding scale   SubCutaneous every 6 hours  ipratropium 17 MICROgram(s) HFA Inhaler 1 Puff(s) Inhalation every 6 hours  LORazepam     Tablet 1 milliGRAM(s) Oral every 8 hours  meropenem  IVPB 1000 milliGRAM(s) IV Intermittent every 8 hours  norepinephrine Infusion 0.05 MICROgram(s)/kG/Min (4.97 mL/Hr) IV Continuous <Continuous>  pantoprazole   Suspension 40 milliGRAM(s) Oral before breakfast  polyethylene glycol 3350 17 Gram(s) Oral every 12 hours  potassium phosphate / sodium phosphate Powder (PHOS-NaK) 2 Packet(s) Oral daily  senna 2 Tablet(s) Oral at bedtime  sucralfate suspension 1 Gram(s) Oral every 6 hours    MEDICATIONS  (PRN):  acetaminophen    Suspension .. 650 milliGRAM(s) Oral every 6 hours PRN Temp greater or equal to 38C (100.4F)         Vitals log        ICU Vital Signs Last 24 Hrs  T(C): 37.3 (23 Jun 2021 03:17), Max: 37.4 (22 Jun 2021 20:10)  T(F): 99.1 (23 Jun 2021 03:17), Max: 99.3 (22 Jun 2021 20:10)  HR: 80 (23 Jun 2021 05:37) (64 - 104)  BP: 119/58 (23 Jun 2021 03:00) (64/38 - 198/84)  BP(mean): 83 (23 Jun 2021 03:00) (46 - 120)  ABP: --  ABP(mean): --  RR: 18 (23 Jun 2021 03:00) (14 - 43)  SpO2: 95% (23 Jun 2021 05:37) (92% - 100%)       Mode: AC/ CMV (Assist Control/ Continuous Mandatory Ventilation)  RR (machine): 14  TV (machine): 400  FiO2: 21  PEEP: 5  ITime: 1  MAP: 13  PIP: 28      Input and Output:  I&O's Detail    21 Jun 2021 07:01  -  22 Jun 2021 07:00  --------------------------------------------------------  IN:    Enteral Tube Flush: 220 mL    Free Water: 1500 mL    Glucerna 1.5: 1050 mL    IV PiggyBack: 250 mL    IV PiggyBack: 150 mL  Total IN: 3170 mL    OUT:    Norepinephrine: 0 mL    Voided (mL): 900 mL  Total OUT: 900 mL    Total NET: 2270 mL      22 Jun 2021 07:01  -  23 Jun 2021 06:16  --------------------------------------------------------  IN:    Enteral Tube Flush: 120 mL    Free Water: 250 mL    Glucerna 1.5: 350 mL    IV PiggyBack: 50 mL  Total IN: 770 mL    OUT:  Total OUT: 0 mL    Total NET: 770 mL          Lab Data                        11.5   7.70  )-----------( 283      ( 22 Jun 2021 05:29 )             36.8     06-22    141  |  109<H>  |  19  ----------------------------<  119<H>  4.3   |  30  |  0.73    Ca    9.2      22 Jun 2021 05:29  Phos  2.4     06-22  Mg     2.6     06-22    TPro  7.6  /  Alb  3.0<L>  /  TBili  0.5  /  DBili  x   /  AST  52<H>  /  ALT  79<H>  /  AlkPhos  110  06-22            Review of Systems	      Objective     Physical Examination    heart s1s2  lung dec bS  abd soft  trach  peg      Pertinent Lab findings & Imaging      Annalise:  NO   Adequate UO     I&O's Detail    21 Jun 2021 07:01  -  22 Jun 2021 07:00  --------------------------------------------------------  IN:    Enteral Tube Flush: 220 mL    Free Water: 1500 mL    Glucerna 1.5: 1050 mL    IV PiggyBack: 250 mL    IV PiggyBack: 150 mL  Total IN: 3170 mL    OUT:    Norepinephrine: 0 mL    Voided (mL): 900 mL  Total OUT: 900 mL    Total NET: 2270 mL      22 Jun 2021 07:01  -  23 Jun 2021 06:16  --------------------------------------------------------  IN:    Enteral Tube Flush: 120 mL    Free Water: 250 mL    Glucerna 1.5: 350 mL    IV PiggyBack: 50 mL  Total IN: 770 mL    OUT:  Total OUT: 0 mL    Total NET: 770 mL               Discussed with:     Cultures:	        Radiology

## 2021-06-23 NOTE — PROGRESS NOTE ADULT - PROBLEM SELECTOR PLAN 9
Gastrointestinal stress ulcer prophylaxis and DVT prophylaxis administered
ppi
Gastrointestinal stress ulcer prophylaxis and DVT prophylaxis administered
Gastrointestinal stress ulcer prophylaxis and DVT prophylaxis administered
ppi
ppi

## 2021-06-23 NOTE — PROGRESS NOTE ADULT - ASSESSMENT
PARASHARAMI BREA 75 f [facility] P 6/15/2021   DR ILIANA KEMP      REVIEW OF SYMPTOMS      Able to give (reliable) ROS  NO     PHYSICAL EXAM    HEENT Unremarkable  atraumatic   RESP Fair air entry EXP prolonged    Harsh breath sound Resp distres mild   CARDIAC S1 S2 No S3     NO JVD    ABDOMEN SOFT BS PRESENT NOT DISTENDED No hepatosplenomegaly   PEDAL EDEMA present No calf tenderness  NO rash     PARASHARAMI BREA 75 f [facility] P 6/15/2021 ADMISSION PROBLEMS    COVID STATUS   scv2 6/15/2021 (-)   spkab 6/16 (+)     SEPTIC SHOCK poa  resolvd 6/16/2021   NOREPI NEEDED 6/19/2021 BRIEFLY     VAP poa  SIGMOID COLITIS 6/15/2021 CTAP    CANDIDUIA 6/17/2021   CASPOFUNGIN 6/17/2021     COPD   ANEMIA   HYPERNATREMIA r  FREE WATER 6/17/2021   RYAN resolvd 6/16/2021     GLOBAL AND BEST PRACTICE ISSUES                     ALLERGY.    CODEINE   WEIGHT.     6/15/2021 53                         BMI               6/15/2021 20.                                         .                          ADVANCED DIRECTIVE.                               HEAD OF BED ELEVATION. Yes  DVT PROPHYLAXIS.   HPSC (6/15/2021)   -> lvnx 40 96/18/2021)                  SQUIRES PROPHYLAXIS.       PROTONIX 40 (6/15/2021)   APA.      ASA 81 (6/15/2021)                                                               DYSPHAGIA RECOMM.   DIET.    GLUCERNA 1.5 1000 (6/15/2021)   INFECTION PROPHYLAXIS.   CHLORHEXIDINE 2% (6/15/2021)   FREE WATER.    250.6 (6/17/2021)     PATIENT DATA                ABG.     6/15/2021 12 40% /400/5/14 742/34/166              OXYGENATION.                   VITALS/IO/VENT/DRIPS.   6/23/2021 afeb 84 150/60   6/23/2021 ac 14/400/5/21 6/22/2021 76 95/50   6/22/2021 ac 14/400/5/.21     ASSESSMENT/RECOMMENDATIONS.    SEPTIC SHOCK poa   target map 65 (+)     VAP poa  SIGMOID COLITIS 6/15/2021 CTAP   FUNGURIA 6/15    w 6/14-6/15-6/16-5/16-5/19-5/21 w 24-12 - 11.2-11.3 - 9.6 - 5.8   ua 6/15/2021 w 6-10 nitr (-) l estr mod   ct cap nc 6/15/2021 mild sigmoid colitis patchy opac lower lobes possibly aspiration   rvp 6/15/2021 rvp (-)  sp sm 6/15 rare gnr   fungtell 6/19/2021 fungitell 44   mrsa 6/15 (-)   urine 6/15 50-99 c  blod c 6/18 (-)   blod c 6/15 (-)    MEROPENEM 1.2 (6/15/2021)   Complete 8 d course   CAPSO 6/17/2021     VENT MANAGEMENT   VENT BUNDLE Target pH 730 (+) PO2 60 (+) po 90-95% Pplat 35 (-)   HOBE DSV DSBT Restrictive fluid strategy   Gas exchange good on 6/15/2021 12 a     COPD   PROV HFA (6/15/2021)   ATRIV (6/15/2021)   under control    RO MI  Tr 6/15/2021 Tr (-)   echo 6/15 n lvsf   mi ruled out     ANEMIA   Hb 6/146/15-6/16-6/18-6/19-5/21 Hb 12.3- 9.7-10.2 -10.5  - 10.7 -  1-.2   target hb 7 (+)     HYPERNATREMIA   Na 6/15-6/16-6/17-6/18-6/19/2021 Na 147 -[id number]    monitor    RYAN  cr 6/14-6/15-6/16-6/19 Cr 1.7-1.3-1.1 - 1.1   improvd     OVERALL PLAN.  VAP/SIGMOID COLITIS poa BSAB   CANDIDURIA capsofung 6/17/2021   VENT MANAGMNT   ANEMIA Monitor  RENAL Monitor   dc planning once abio corse completdv      TIME SPENT   Over 25 minutes aggregate care time spent on encounter; activities included   direct patient care, counseling and/or coordinating care reviewing notes, lab data/ imaging , discussion with multidisciplinary team/ patient  /family and explaining in detail risks, benefits, alternatives  of the recommendations     CHAPINCITO GUIDRY 75 f [facility] P 6/15/2021   DR ILIANA KEMP

## 2021-06-23 NOTE — PROGRESS NOTE ADULT - ASSESSMENT
76 yo Female ,[facility] resident with hx of  PMH Frontotemporal dementia, CVA, chronic vent dependent respiratory failure, dysphagia s/p PEG, recurrent UTI/VAP presents with septic shock from [facility] facility. She was last admitted at [facility] 10/ 2020 with proteus UTI ,sepsis ,hypernatremia and RYAN .Pseudomonas grew in sputum resistant to zosyn last time Admitted for septic workup and evaluation,send blood and urine cx,serial lactate levels,monitor vitals closley,ivfs hydration,monitor urine output and renal profile,iv abx initiated e. On arrival to ER found to be febrile to 102, WBC 20k, lactate 6.7. CXR with haziness of entire right side, urine appears cloudy. Med hx is significant for Advanced dementia ,s/p  Aphasic stroke , Constipation  ,COVID-19   ,CVA (cerebral vascular accident)  ,Dementia of frontal lobe type  ,Diabetes mellitus  ,DM (diabetes mellitus)type  2   ,GERD (gastroesophageal reflux disease)  ,HTN (hypertension)  ,Hypertension  ,Pneumonia  ,Quadriplegia  ,Respiratory failure ,s/p tracheostomy and peg Palliative care consult requested ,to discuss advance directives and complete /update  MOLST  (15 Zay 2021 07:06)      ACUTE RENAL FAILURE: hypernatremia increase water intake   Serum creatinine is improved  0.73    fentaNYL   Patch  12 MICROgram(s)/Hr 1 Patch Transdermal every 72 hours  midodrine 5 milliGRAM(s) Oral every 8 hours  dc planning     Admit for septic workup and ID evaluation,send blood and urine cx,serial lactate levels,monitor vitals closley,ivfs hydration,monitor urine output and renal profile  enoxaparin Injectable 40 milliGRAM(s) SubCutaneous daily

## 2021-06-23 NOTE — DISCHARGE NOTE NURSING/CASE MANAGEMENT/SOCIAL WORK - CAREGIVER PHONE NUMBER
COVID-19 PCR test completed. Patient handout For Patients Who Have Been Tested for Covid-19 (Coronavirus) was given to the patient, which includes test result notification process.    
[phone]

## 2021-06-23 NOTE — PROGRESS NOTE ADULT - NUTRITIONAL ASSESSMENT
MEDICATIONS  (STANDING):  ALBUTerol    90 MICROgram(s) HFA Inhaler 2 Puff(s) Inhalation every 6 hours  aspirin  chewable 81 milliGRAM(s) Oral daily  bisacodyl 5 milliGRAM(s) Oral at bedtime  caspofungin IVPB      caspofungin IVPB 50 milliGRAM(s) IV Intermittent every 24 hours  chlorhexidine 2% Cloths 1 Application(s) Topical <User Schedule>  dextrose 40% Gel 15 Gram(s) Oral once  dextrose 5%. 1000 milliLiter(s) (50 mL/Hr) IV Continuous <Continuous>  dextrose 5%. 1000 milliLiter(s) (100 mL/Hr) IV Continuous <Continuous>  dextrose 50% Injectable 25 Gram(s) IV Push once  dextrose 50% Injectable 12.5 Gram(s) IV Push once  dextrose 50% Injectable 25 Gram(s) IV Push once  enoxaparin Injectable 40 milliGRAM(s) SubCutaneous daily  fentaNYL   Patch  12 MICROgram(s)/Hr 1 Patch Transdermal every 72 hours  glucagon  Injectable 1 milliGRAM(s) IntraMuscular once  insulin glargine Injectable (LANTUS) 20 Unit(s) SubCutaneous at bedtime  insulin lispro (ADMELOG) corrective regimen sliding scale   SubCutaneous every 6 hours  ipratropium 17 MICROgram(s) HFA Inhaler 1 Puff(s) Inhalation every 6 hours  LORazepam     Tablet 1 milliGRAM(s) Oral every 8 hours  meropenem  IVPB 1000 milliGRAM(s) IV Intermittent every 8 hours  midodrine 5 milliGRAM(s) Oral every 8 hours  norepinephrine Infusion 0.05 MICROgram(s)/kG/Min (4.97 mL/Hr) IV Continuous <Continuous>  pantoprazole   Suspension 40 milliGRAM(s) Oral before breakfast  polyethylene glycol 3350 17 Gram(s) Oral every 12 hours  senna 2 Tablet(s) Oral at bedtime  sucralfate suspension 1 Gram(s) Oral every 6 hours
MEDICATIONS  (STANDING):  ALBUTerol    90 MICROgram(s) HFA Inhaler 2 Puff(s) Inhalation every 6 hours  aspirin  chewable 81 milliGRAM(s) Oral daily  bisacodyl 5 milliGRAM(s) Oral at bedtime  caspofungin IVPB      caspofungin IVPB 50 milliGRAM(s) IV Intermittent every 24 hours  chlorhexidine 2% Cloths 1 Application(s) Topical <User Schedule>  dextrose 40% Gel 15 Gram(s) Oral once  dextrose 5%. 1000 milliLiter(s) (50 mL/Hr) IV Continuous <Continuous>  dextrose 5%. 1000 milliLiter(s) (100 mL/Hr) IV Continuous <Continuous>  dextrose 50% Injectable 25 Gram(s) IV Push once  dextrose 50% Injectable 12.5 Gram(s) IV Push once  dextrose 50% Injectable 25 Gram(s) IV Push once  enoxaparin Injectable 40 milliGRAM(s) SubCutaneous daily  fentaNYL   Patch  12 MICROgram(s)/Hr 1 Patch Transdermal every 72 hours  glucagon  Injectable 1 milliGRAM(s) IntraMuscular once  insulin glargine Injectable (LANTUS) 20 Unit(s) SubCutaneous at bedtime  insulin lispro (ADMELOG) corrective regimen sliding scale   SubCutaneous every 6 hours  ipratropium 17 MICROgram(s) HFA Inhaler 1 Puff(s) Inhalation every 6 hours  LORazepam     Tablet 1 milliGRAM(s) Oral every 8 hours  meropenem  IVPB 1000 milliGRAM(s) IV Intermittent every 8 hours  midodrine 5 milliGRAM(s) Oral every 8 hours  norepinephrine Infusion 0.05 MICROgram(s)/kG/Min (4.97 mL/Hr) IV Continuous <Continuous>  pantoprazole   Suspension 40 milliGRAM(s) Oral before breakfast  polyethylene glycol 3350 17 Gram(s) Oral every 12 hours  senna 2 Tablet(s) Oral at bedtime  sucralfate suspension 1 Gram(s) Oral every 6 hours
MEDICATIONS  (STANDING):  ALBUTerol    90 MICROgram(s) HFA Inhaler 2 Puff(s) Inhalation every 6 hours  aspirin  chewable 81 milliGRAM(s) Oral daily  bisacodyl 5 milliGRAM(s) Oral at bedtime  caspofungin IVPB      chlorhexidine 2% Cloths 1 Application(s) Topical <User Schedule>  dextrose 40% Gel 15 Gram(s) Oral once  dextrose 5%. 1000 milliLiter(s) (50 mL/Hr) IV Continuous <Continuous>  dextrose 5%. 1000 milliLiter(s) (100 mL/Hr) IV Continuous <Continuous>  dextrose 50% Injectable 25 Gram(s) IV Push once  dextrose 50% Injectable 12.5 Gram(s) IV Push once  dextrose 50% Injectable 25 Gram(s) IV Push once  fentaNYL   Patch  12 MICROgram(s)/Hr 1 Patch Transdermal every 72 hours  glucagon  Injectable 1 milliGRAM(s) IntraMuscular once  heparin   Injectable 5000 Unit(s) SubCutaneous every 8 hours  insulin glargine Injectable (LANTUS) 20 Unit(s) SubCutaneous at bedtime  insulin lispro (ADMELOG) corrective regimen sliding scale   SubCutaneous every 6 hours  ipratropium 17 MICROgram(s) HFA Inhaler 1 Puff(s) Inhalation every 6 hours  meropenem  IVPB 1000 milliGRAM(s) IV Intermittent every 8 hours  pantoprazole   Suspension 40 milliGRAM(s) Oral before breakfast  polyethylene glycol 3350 17 Gram(s) Oral daily  senna 2 Tablet(s) Oral at bedtime  sucralfate suspension 1 Gram(s) Oral every 6 hours
MEDICATIONS  (STANDING):  ALBUTerol    90 MICROgram(s) HFA Inhaler 2 Puff(s) Inhalation every 6 hours  aspirin  chewable 81 milliGRAM(s) Oral daily  bisacodyl 5 milliGRAM(s) Oral at bedtime  chlorhexidine 2% Cloths 1 Application(s) Topical <User Schedule>  dextrose 40% Gel 15 Gram(s) Oral once  dextrose 5%. 1000 milliLiter(s) (50 mL/Hr) IV Continuous <Continuous>  dextrose 5%. 1000 milliLiter(s) (100 mL/Hr) IV Continuous <Continuous>  dextrose 50% Injectable 25 Gram(s) IV Push once  dextrose 50% Injectable 12.5 Gram(s) IV Push once  dextrose 50% Injectable 25 Gram(s) IV Push once  fentaNYL   Patch  12 MICROgram(s)/Hr 1 Patch Transdermal every 72 hours  glucagon  Injectable 1 milliGRAM(s) IntraMuscular once  heparin   Injectable 5000 Unit(s) SubCutaneous every 8 hours  insulin glargine Injectable (LANTUS) 20 Unit(s) SubCutaneous at bedtime  insulin lispro (ADMELOG) corrective regimen sliding scale   SubCutaneous every 6 hours  ipratropium 17 MICROgram(s) HFA Inhaler 1 Puff(s) Inhalation every 6 hours  meropenem  IVPB      meropenem  IVPB 1000 milliGRAM(s) IV Intermittent every 12 hours  pantoprazole   Suspension 40 milliGRAM(s) Oral before breakfast  polyethylene glycol 3350 17 Gram(s) Oral daily  senna 2 Tablet(s) Oral at bedtime  sucralfate suspension 1 Gram(s) Oral every 6 hours
MEDICATIONS  (STANDING):  ALBUTerol    90 MICROgram(s) HFA Inhaler 2 Puff(s) Inhalation every 6 hours  aspirin  chewable 81 milliGRAM(s) Oral daily  bisacodyl 5 milliGRAM(s) Oral at bedtime  caspofungin IVPB      caspofungin IVPB 50 milliGRAM(s) IV Intermittent every 24 hours  chlorhexidine 2% Cloths 1 Application(s) Topical <User Schedule>  dextrose 40% Gel 15 Gram(s) Oral once  dextrose 5%. 1000 milliLiter(s) (50 mL/Hr) IV Continuous <Continuous>  dextrose 5%. 1000 milliLiter(s) (100 mL/Hr) IV Continuous <Continuous>  dextrose 50% Injectable 25 Gram(s) IV Push once  dextrose 50% Injectable 12.5 Gram(s) IV Push once  dextrose 50% Injectable 25 Gram(s) IV Push once  enoxaparin Injectable 40 milliGRAM(s) SubCutaneous daily  fentaNYL   Patch  12 MICROgram(s)/Hr 1 Patch Transdermal every 72 hours  glucagon  Injectable 1 milliGRAM(s) IntraMuscular once  insulin glargine Injectable (LANTUS) 20 Unit(s) SubCutaneous at bedtime  insulin lispro (ADMELOG) corrective regimen sliding scale   SubCutaneous every 6 hours  ipratropium 17 MICROgram(s) HFA Inhaler 1 Puff(s) Inhalation every 6 hours  meropenem  IVPB 1000 milliGRAM(s) IV Intermittent every 8 hours  norepinephrine Infusion 0.05 MICROgram(s)/kG/Min (4.97 mL/Hr) IV Continuous <Continuous>  pantoprazole   Suspension 40 milliGRAM(s) Oral before breakfast  polyethylene glycol 3350 17 Gram(s) Oral daily  senna 2 Tablet(s) Oral at bedtime  sucralfate suspension 1 Gram(s) Oral every 6 hours
MEDICATIONS  (STANDING):  ALBUTerol    90 MICROgram(s) HFA Inhaler 2 Puff(s) Inhalation every 6 hours  aspirin  chewable 81 milliGRAM(s) Oral daily  bisacodyl 5 milliGRAM(s) Oral at bedtime  caspofungin IVPB      caspofungin IVPB 50 milliGRAM(s) IV Intermittent every 24 hours  chlorhexidine 2% Cloths 1 Application(s) Topical <User Schedule>  dextrose 40% Gel 15 Gram(s) Oral once  dextrose 5%. 1000 milliLiter(s) (50 mL/Hr) IV Continuous <Continuous>  dextrose 5%. 1000 milliLiter(s) (100 mL/Hr) IV Continuous <Continuous>  dextrose 50% Injectable 25 Gram(s) IV Push once  dextrose 50% Injectable 12.5 Gram(s) IV Push once  dextrose 50% Injectable 25 Gram(s) IV Push once  enoxaparin Injectable 40 milliGRAM(s) SubCutaneous daily  fentaNYL   Patch  12 MICROgram(s)/Hr 1 Patch Transdermal every 72 hours  glucagon  Injectable 1 milliGRAM(s) IntraMuscular once  insulin glargine Injectable (LANTUS) 20 Unit(s) SubCutaneous at bedtime  insulin lispro (ADMELOG) corrective regimen sliding scale   SubCutaneous every 6 hours  ipratropium 17 MICROgram(s) HFA Inhaler 1 Puff(s) Inhalation every 6 hours  LORazepam     Tablet 1 milliGRAM(s) Oral every 8 hours  meropenem  IVPB 1000 milliGRAM(s) IV Intermittent every 8 hours  midodrine 5 milliGRAM(s) Oral every 8 hours  norepinephrine Infusion 0.05 MICROgram(s)/kG/Min (4.97 mL/Hr) IV Continuous <Continuous>  pantoprazole   Suspension 40 milliGRAM(s) Oral before breakfast  polyethylene glycol 3350 17 Gram(s) Oral every 12 hours  senna 2 Tablet(s) Oral at bedtime  sucralfate suspension 1 Gram(s) Oral every 6 hours
MEDICATIONS  (STANDING):  ALBUTerol    90 MICROgram(s) HFA Inhaler 2 Puff(s) Inhalation every 6 hours  aspirin  chewable 81 milliGRAM(s) Oral daily  bisacodyl 5 milliGRAM(s) Oral at bedtime  caspofungin IVPB      chlorhexidine 2% Cloths 1 Application(s) Topical <User Schedule>  dextrose 40% Gel 15 Gram(s) Oral once  dextrose 5%. 1000 milliLiter(s) (50 mL/Hr) IV Continuous <Continuous>  dextrose 5%. 1000 milliLiter(s) (100 mL/Hr) IV Continuous <Continuous>  dextrose 50% Injectable 25 Gram(s) IV Push once  dextrose 50% Injectable 12.5 Gram(s) IV Push once  dextrose 50% Injectable 25 Gram(s) IV Push once  fentaNYL   Patch  12 MICROgram(s)/Hr 1 Patch Transdermal every 72 hours  glucagon  Injectable 1 milliGRAM(s) IntraMuscular once  heparin   Injectable 5000 Unit(s) SubCutaneous every 8 hours  insulin glargine Injectable (LANTUS) 20 Unit(s) SubCutaneous at bedtime  insulin lispro (ADMELOG) corrective regimen sliding scale   SubCutaneous every 6 hours  ipratropium 17 MICROgram(s) HFA Inhaler 1 Puff(s) Inhalation every 6 hours  meropenem  IVPB 1000 milliGRAM(s) IV Intermittent every 8 hours  pantoprazole   Suspension 40 milliGRAM(s) Oral before breakfast  polyethylene glycol 3350 17 Gram(s) Oral daily  senna 2 Tablet(s) Oral at bedtime  sucralfate suspension 1 Gram(s) Oral every 6 hours
MEDICATIONS  (STANDING):  ALBUTerol    90 MICROgram(s) HFA Inhaler 2 Puff(s) Inhalation every 6 hours  aspirin  chewable 81 milliGRAM(s) Oral daily  bisacodyl 5 milliGRAM(s) Oral at bedtime  caspofungin IVPB      caspofungin IVPB 50 milliGRAM(s) IV Intermittent every 24 hours  chlorhexidine 2% Cloths 1 Application(s) Topical <User Schedule>  dextrose 40% Gel 15 Gram(s) Oral once  dextrose 5%. 1000 milliLiter(s) (50 mL/Hr) IV Continuous <Continuous>  dextrose 5%. 1000 milliLiter(s) (100 mL/Hr) IV Continuous <Continuous>  dextrose 50% Injectable 25 Gram(s) IV Push once  dextrose 50% Injectable 12.5 Gram(s) IV Push once  dextrose 50% Injectable 25 Gram(s) IV Push once  enoxaparin Injectable 40 milliGRAM(s) SubCutaneous daily  fentaNYL   Patch  12 MICROgram(s)/Hr 1 Patch Transdermal every 72 hours  glucagon  Injectable 1 milliGRAM(s) IntraMuscular once  insulin glargine Injectable (LANTUS) 20 Unit(s) SubCutaneous at bedtime  insulin lispro (ADMELOG) corrective regimen sliding scale   SubCutaneous every 6 hours  ipratropium 17 MICROgram(s) HFA Inhaler 1 Puff(s) Inhalation every 6 hours  meropenem  IVPB 1000 milliGRAM(s) IV Intermittent every 8 hours  norepinephrine Infusion 0.05 MICROgram(s)/kG/Min (4.97 mL/Hr) IV Continuous <Continuous>  pantoprazole   Suspension 40 milliGRAM(s) Oral before breakfast  polyethylene glycol 3350 17 Gram(s) Oral daily  senna 2 Tablet(s) Oral at bedtime  sucralfate suspension 1 Gram(s) Oral every 6 hours
Seen  by RD ,who recommended to start TF of Glucerna 1.5 at 20ml and increase by 10ml every 6hrs until goal rate is reached of 50ml x 20hrs for a total volume of 1,000ml/day, provided 1,500 kcal/day, 83 gram protein/day, 759ml/ day of water.

## 2021-06-23 NOTE — PROGRESS NOTE ADULT - PROBLEM SELECTOR PLAN 6
bowel regimen
corrective regimen sliding scale Type 2 DM  - insulin sliding scale  - continue Lantus 20qhs

## 2021-06-23 NOTE — DISCHARGE NOTE NURSING/CASE MANAGEMENT/SOCIAL WORK - PATIENT PORTAL LINK FT
You can access the FollowMyHealth Patient Portal offered by [facility] by registering at the following website: [email or web address]. By joining [employer]’s FollowMyHealth portal, you will also be able to view your health information using other applications (apps) compatible with our system.

## 2021-06-23 NOTE — PROGRESS NOTE ADULT - REASON FOR ADMISSION
fever and dyspnea

## 2021-06-23 NOTE — PROGRESS NOTE ADULT - PROBLEM SELECTOR PLAN 3
- Hx of proteus UTI  - UA borderline positive   - C/w Meropenem
caspofungin added 06/17 ( candida in urine -50,000 - 99,000 CFU/mL Presumptive Candida albicans )
serial abgs ,bmp ,lactate serum level
caspofungin added 06/17 ( candida in urine -50,000 - 99,000 CFU/mL Presumptive Candida albicans )

## 2021-06-23 NOTE — PROGRESS NOTE ADULT - SUBJECTIVE AND OBJECTIVE BOX
[facility] DIVISION of INFECTIOUS DISEASE  Arturo Olivas MD PhD, Haylee Umanzor MD, Andressa Brantley MD, Billy Valenzuela MD  and providing coverage with Alexa Canas MD and Eusebio Chairez MD  Providing Infectious Disease Consultations at [facility], [facility], [facility]'s    Office# [phone] to schedule follow up appointments  Answering Service for urgent calls or New Consults [phone]  Cell# to text for urgent issues Arturo Olivas [phone]     infectious diseases progress note:    BREA BECKHAM is a 75y y. o. Female patient    No concerning overnight events    Allergies    codeine (Hives)    Intolerances        ANTIBIOTICS/RELEVANT:  antimicrobials  caspofungin IVPB      caspofungin IVPB 50 milliGRAM(s) IV Intermittent every 24 hours  meropenem  IVPB 1000 milliGRAM(s) IV Intermittent every 8 hours    immunologic:    OTHER:  acetaminophen    Suspension .. 650 milliGRAM(s) Oral every 6 hours PRN  ALBUTerol    90 MICROgram(s) HFA Inhaler 2 Puff(s) Inhalation every 6 hours  aspirin  chewable 81 milliGRAM(s) Oral daily  bisacodyl 5 milliGRAM(s) Oral at bedtime  chlorhexidine 2% Cloths 1 Application(s) Topical <User Schedule>  dextrose 40% Gel 15 Gram(s) Oral once  dextrose 5%. 1000 milliLiter(s) IV Continuous <Continuous>  dextrose 5%. 1000 milliLiter(s) IV Continuous <Continuous>  dextrose 50% Injectable 25 Gram(s) IV Push once  dextrose 50% Injectable 12.5 Gram(s) IV Push once  dextrose 50% Injectable 25 Gram(s) IV Push once  enoxaparin Injectable 40 milliGRAM(s) SubCutaneous daily  glucagon  Injectable 1 milliGRAM(s) IntraMuscular once  insulin glargine Injectable (LANTUS) 20 Unit(s) SubCutaneous at bedtime  insulin lispro (ADMELOG) corrective regimen sliding scale   SubCutaneous every 6 hours  ipratropium 17 MICROgram(s) HFA Inhaler 1 Puff(s) Inhalation every 6 hours  LORazepam     Tablet 1 milliGRAM(s) Oral every 8 hours  norepinephrine Infusion 0.05 MICROgram(s)/kG/Min IV Continuous <Continuous>  pantoprazole   Suspension 40 milliGRAM(s) Oral before breakfast  polyethylene glycol 3350 17 Gram(s) Oral every 12 hours  potassium phosphate / sodium phosphate Powder (PHOS-NaK) 2 Packet(s) Oral daily  senna 2 Tablet(s) Oral at bedtime  sucralfate suspension 1 Gram(s) Oral every 6 hours      Objective:  Vital Signs Last 24 Hrs  T(C): 37 (23 Jun 2021 07:06), Max: 37.4 (22 Jun 2021 20:10)  T(F): 98.6 (23 Jun 2021 07:06), Max: 99.3 (22 Jun 2021 20:10)  HR: 84 (23 Jun 2021 08:12) (65 - 104)  BP: 129/63 (23 Jun 2021 07:00) (64/38 - 198/84)  BP(mean): 90 (23 Jun 2021 07:00) (46 - 120)  RR: 23 (23 Jun 2021 07:00) (14 - 43)  SpO2: 93% (23 Jun 2021 08:12) (92% - 100%)    T(C): 37 (06-23-21 @ 07:06), Max: 37.4 (06-22-21 @ 20:10)  T(C): 37 (06-23-21 @ 07:06), Max: 37.4 (06-22-21 @ 20:10)  T(C): 37 (06-23-21 @ 07:06), Max: 37.4 (06-20-21 @ 00:00)    PHYSICAL EXAM: Intubated via trach  HEENT: NC atraumatic  Neck: supple  Respiratory: no accessory muscle use, breathing comfortably  Cardiovascular: distant  Gastrointestinal: normal appearing, nondistended  Extremities: no clubbing, no cyanosis,    Mode: AC/ CMV (Assist Control/ Continuous Mandatory Ventilation), RR (machine): 14, TV (machine): 400, FiO2: 21, PEEP: 5, ITime: 1, MAP: 13, PIP: 29    LABS:                          10.5   13.85 )-----------( 249      ( 23 Jun 2021 08:50 )             33.9       13.85 06-23 @ 08:50  7.70 06-22 @ 05:29  5.84 06-21 @ 05:51  5.38 06-20 @ 05:34  9.62 06-19 @ 09:27  8.10 06-18 @ 05:30  11.38 06-17 @ 05:12      06-23    143  |  110<H>  |  20  ----------------------------<  126<H>  5.0   |  27  |  0.85    Ca    9.1      23 Jun 2021 08:50  Phos  3.1     06-23  Mg     2.4     06-23    TPro  7.1  /  Alb  2.8<L>  /  TBili  0.6  /  DBili  x   /  AST  29  /  ALT  57  /  AlkPhos  109  06-23      Creatinine, Serum: 0.85 mg/dL (06-23-21 @ 08:50)  Creatinine, Serum: 0.73 mg/dL (06-22-21 @ 05:29)  Creatinine, Serum: 0.78 mg/dL (06-21-21 @ 05:51)  Creatinine, Serum: 0.87 mg/dL (06-20-21 @ 05:34)  Creatinine, Serum: 1.10 mg/dL (06-19-21 @ 09:27)  Creatinine, Serum: 0.83 mg/dL (06-18-21 @ 05:30)  Creatinine, Serum: 1.00 mg/dL (06-17-21 @ 05:12)      PT/INR - ( 22 Jun 2021 05:29 )   PT: 11.3 sec;   INR: 0.96 ratio         PTT - ( 22 Jun 2021 05:29 )  PTT:33.0 sec          INFLAMMATORY MARKERS  Auto Neutrophil #: 4.41 K/uL (06-22-21 @ 05:29)  Auto Lymphocyte #: 2.06 K/uL (06-22-21 @ 05:29)  Auto Neutrophil #: 2.48 K/uL (06-21-21 @ 05:51)  Auto Lymphocyte #: 2.28 K/uL (06-21-21 @ 05:51)  Auto Neutrophil #: 2.68 K/uL (06-20-21 @ 05:34)  Auto Lymphocyte #: 1.89 K/uL (06-20-21 @ 05:34)  Auto Neutrophil #: 7.74 K/uL (06-19-21 @ 09:27)  Auto Lymphocyte #: 1.09 K/uL (06-19-21 @ 09:27)  Auto Neutrophil #: 6.89 K/uL (06-18-21 @ 05:30)  Auto Lymphocyte #: 0.52 K/uL (06-18-21 @ 05:30)  Auto Neutrophil #: 8.13 K/uL (06-17-21 @ 05:12)  Auto Lymphocyte #: 2.39 K/uL (06-17-21 @ 05:12)  Auto Neutrophil #: 7.46 K/uL (06-16-21 @ 05:32)  Auto Lymphocyte #: 2.66 K/uL (06-16-21 @ 05:32)  Auto Neutrophil #: 9.88 K/uL (06-15-21 @ 05:22)  Auto Lymphocyte #: 1.83 K/uL (06-15-21 @ 05:22)  Auto Neutrophil #: 20.20 K/uL (06-14-21 @ 23:02)  Auto Lymphocyte #: 2.68 K/uL (06-14-21 @ 23:02)    Lactate, Blood: 2.9 mmol/L (06-15-21 @ 05:22)  Lactate, Blood: 6.7 mmol/L (06-14-21 @ 23:02)    Auto Eosinophil #: 0.21 K/uL (06-22-21 @ 05:29)  Auto Eosinophil #: 0.21 K/uL (06-21-21 @ 05:51)  Auto Eosinophil #: 0.19 K/uL (06-20-21 @ 05:34)  Auto Eosinophil #: 0.07 K/uL (06-19-21 @ 09:27)  Auto Eosinophil #: 0.19 K/uL (06-18-21 @ 05:30)  Auto Eosinophil #: 0.14 K/uL (06-17-21 @ 05:12)  Auto Eosinophil #: 0.15 K/uL (06-16-21 @ 05:32)  Auto Eosinophil #: 0.01 K/uL (06-15-21 @ 05:22)  Auto Eosinophil #: 0.00 K/uL (06-14-21 @ 23:02)          Troponin I, Serum: <.015 ng/mL (06-15-21 @ 14:40)  Troponin I, Serum: .029 ng/mL (06-15-21 @ 05:22)                Activated Partial Thromboplastin Time: 33.0 sec (06-22-21 @ 05:29)  INR: 0.96 ratio (06-22-21 @ 05:29)  Activated Partial Thromboplastin Time: 37.7 sec (06-14-21 @ 23:02)  INR: 1.10 ratio (06-14-21 @ 23:02)          MICROBIOLOGY:              RADIOLOGY & ADDITIONAL STUDIES:

## 2021-06-23 NOTE — PROGRESS NOTE ADULT - PROBLEM SELECTOR PLAN 5
improving -serial bmp ,ins/outs ,nephrology eval , iv hydration
corrective regimen sliding scale ,resume GT feeds with aspiration precautions
improving -serial bmp ,ins/outs ,nephrology eval , iv hydration
serial bmp ,ins/outs ,nephrology eval , iv hydration
improving -serial bmp ,ins/outs ,nephrology eval , iv hydration

## 2021-06-23 NOTE — PROGRESS NOTE ADULT - PROBLEM SELECTOR PLAN 7
bowel regimen
2/2 to brain parenchyma neurodegenerative process -started on ativan .Seen b y neurologist -no need in antiepileptic medications
bowel regimen
ppi
2/2 to brain parenchyma neurodegenerative process -started on ativan .Seen b y neurologist -no need in antiepileptic medications
bowel regimen

## 2021-09-03 NOTE — PROGRESS NOTE ADULT - ASSESSMENT
74 Female transferred from  [facility] center to [facility] ED with report of sepsis, pneumonia, RYAN, patient on chronic trach collar , recent admission to [facility]  5/4/20 to 5/18/20 for hypoxia, pneumonia, COVID positive 04/27/20 ,adm now 5/28 with septic picture     5/30-Mode: AC/ CMV (Assist Control/ Continuous Mandatory Ventilation), RR (machine): 14, TV (machine): 420, FiO2: 35, PEEP: 5-on ZOSYN/VANCO    5/31-RR (machine): 14, TV (machine): 420, FiO2: 30, PEEP: 5 continue off abx. 03-Sep-2021 18:53

## 2021-09-07 ENCOUNTER — INPATIENT (INPATIENT)
Facility: HOSPITAL | Age: 76
LOS: 7 days | Discharge: NURSING, REHAB OR LONG-TERM CARE | DRG: 853 | End: 2021-09-15
Attending: INTERNAL MEDICINE | Admitting: INTERNAL MEDICINE
Payer: MEDICARE

## 2021-09-07 VITALS — WEIGHT: 130.07 LBS | HEIGHT: 64 IN

## 2021-09-07 DIAGNOSIS — Z93.0 TRACHEOSTOMY STATUS: Chronic | ICD-10-CM

## 2021-09-07 DIAGNOSIS — Z93.1 GASTROSTOMY STATUS: Chronic | ICD-10-CM

## 2021-09-07 DIAGNOSIS — A41.9 SEPSIS, UNSPECIFIED ORGANISM: ICD-10-CM

## 2021-09-07 PROBLEM — E11.9 TYPE 2 DIABETES MELLITUS WITHOUT COMPLICATIONS: Chronic | Status: ACTIVE | Noted: 2021-06-15

## 2021-09-07 LAB
ALBUMIN SERPL ELPH-MCNC: 3.3 G/DL — SIGNIFICANT CHANGE UP (ref 3.3–5)
ALP SERPL-CCNC: 133 U/L — HIGH (ref 30–120)
ALT FLD-CCNC: 81 U/L DA — HIGH (ref 10–60)
ANION GAP SERPL CALC-SCNC: 23 MMOL/L — HIGH (ref 5–17)
APPEARANCE UR: ABNORMAL
APTT BLD: 41.4 SEC — HIGH (ref 27.5–35.5)
AST SERPL-CCNC: 205 U/L — HIGH (ref 10–40)
BACTERIA # UR AUTO: ABNORMAL
BASOPHILS # BLD AUTO: 0 K/UL — SIGNIFICANT CHANGE UP (ref 0–0.2)
BASOPHILS NFR BLD AUTO: 0 % — SIGNIFICANT CHANGE UP (ref 0–2)
BILIRUB SERPL-MCNC: 0.7 MG/DL — SIGNIFICANT CHANGE UP (ref 0.2–1.2)
BILIRUB UR-MCNC: NEGATIVE — SIGNIFICANT CHANGE UP
BUN SERPL-MCNC: 100 MG/DL — HIGH (ref 7–23)
CALCIUM SERPL-MCNC: 10 MG/DL — SIGNIFICANT CHANGE UP (ref 8.4–10.5)
CHLORIDE SERPL-SCNC: 90 MMOL/L — LOW (ref 96–108)
CO2 SERPL-SCNC: 27 MMOL/L — SIGNIFICANT CHANGE UP (ref 22–31)
COLOR SPEC: YELLOW — SIGNIFICANT CHANGE UP
CREAT SERPL-MCNC: 2.58 MG/DL — HIGH (ref 0.5–1.3)
DIFF PNL FLD: ABNORMAL
EOSINOPHIL # BLD AUTO: 0 K/UL — SIGNIFICANT CHANGE UP (ref 0–0.5)
EOSINOPHIL NFR BLD AUTO: 0 % — SIGNIFICANT CHANGE UP (ref 0–6)
EPI CELLS # UR: SIGNIFICANT CHANGE UP
GLUCOSE BLDC GLUCOMTR-MCNC: 148 MG/DL — HIGH (ref 70–99)
GLUCOSE BLDC GLUCOMTR-MCNC: 179 MG/DL — HIGH (ref 70–99)
GLUCOSE SERPL-MCNC: 153 MG/DL — HIGH (ref 70–99)
GLUCOSE UR QL: NEGATIVE MG/DL — SIGNIFICANT CHANGE UP
HCT VFR BLD CALC: 40.1 % — SIGNIFICANT CHANGE UP (ref 34.5–45)
HGB BLD-MCNC: 11.6 G/DL — SIGNIFICANT CHANGE UP (ref 11.5–15.5)
INR BLD: 1.03 RATIO — SIGNIFICANT CHANGE UP (ref 0.88–1.16)
KETONES UR-MCNC: NEGATIVE — SIGNIFICANT CHANGE UP
LACTATE SERPL-SCNC: 1.1 MMOL/L — SIGNIFICANT CHANGE UP (ref 0.7–2)
LACTATE SERPL-SCNC: 13.5 MMOL/L — CRITICAL HIGH (ref 0.7–2)
LACTATE SERPL-SCNC: 3.7 MMOL/L — HIGH (ref 0.7–2)
LEUKOCYTE ESTERASE UR-ACNC: ABNORMAL
LYMPHOCYTES # BLD AUTO: 10 % — LOW (ref 13–44)
LYMPHOCYTES # BLD AUTO: 2.65 K/UL — SIGNIFICANT CHANGE UP (ref 1–3.3)
MANUAL SMEAR VERIFICATION: SIGNIFICANT CHANGE UP
MCHC RBC-ENTMCNC: 26 PG — LOW (ref 27–34)
MCHC RBC-ENTMCNC: 28.9 GM/DL — LOW (ref 32–36)
MCV RBC AUTO: 89.7 FL — SIGNIFICANT CHANGE UP (ref 80–100)
MONOCYTES # BLD AUTO: 1.59 K/UL — HIGH (ref 0–0.9)
MONOCYTES NFR BLD AUTO: 6 % — SIGNIFICANT CHANGE UP (ref 2–14)
NEUTROPHILS # BLD AUTO: 22.22 K/UL — HIGH (ref 1.8–7.4)
NEUTROPHILS NFR BLD AUTO: 74 % — SIGNIFICANT CHANGE UP (ref 43–77)
NEUTS BAND # BLD: 10 % — HIGH (ref 0–8)
NITRITE UR-MCNC: NEGATIVE — SIGNIFICANT CHANGE UP
NRBC # BLD: 0 — SIGNIFICANT CHANGE UP
NRBC # BLD: SIGNIFICANT CHANGE UP /100 WBCS (ref 0–0)
NT-PROBNP SERPL-SCNC: 2391 PG/ML — HIGH (ref 0–450)
PH UR: 8 — SIGNIFICANT CHANGE UP (ref 5–8)
PLAT MORPH BLD: NORMAL — SIGNIFICANT CHANGE UP
PLATELET # BLD AUTO: 362 K/UL — SIGNIFICANT CHANGE UP (ref 150–400)
POTASSIUM SERPL-MCNC: 4 MMOL/L — SIGNIFICANT CHANGE UP (ref 3.5–5.3)
POTASSIUM SERPL-SCNC: 4 MMOL/L — SIGNIFICANT CHANGE UP (ref 3.5–5.3)
PROT SERPL-MCNC: 9.4 G/DL — HIGH (ref 6–8.3)
PROT UR-MCNC: 500 MG/DL
PROTHROM AB SERPL-ACNC: 12.4 SEC — SIGNIFICANT CHANGE UP (ref 10.6–13.6)
RAPID RVP RESULT: SIGNIFICANT CHANGE UP
RBC # BLD: 4.47 M/UL — SIGNIFICANT CHANGE UP (ref 3.8–5.2)
RBC # FLD: 16.1 % — HIGH (ref 10.3–14.5)
RBC BLD AUTO: NORMAL — SIGNIFICANT CHANGE UP
RBC CASTS # UR COMP ASSIST: ABNORMAL /HPF (ref 0–4)
SARS-COV-2 RNA SPEC QL NAA+PROBE: SIGNIFICANT CHANGE UP
SODIUM SERPL-SCNC: 140 MMOL/L — SIGNIFICANT CHANGE UP (ref 135–145)
SP GR SPEC: 1.01 — SIGNIFICANT CHANGE UP (ref 1.01–1.02)
TROPONIN I SERPL-MCNC: 0.12 NG/ML — HIGH (ref 0.02–0.06)
TROPONIN I SERPL-MCNC: 0.13 NG/ML — HIGH (ref 0.02–0.06)
UROBILINOGEN FLD QL: NEGATIVE MG/DL — SIGNIFICANT CHANGE UP
WBC # BLD: 26.45 K/UL — HIGH (ref 3.8–10.5)
WBC # FLD AUTO: 26.45 K/UL — HIGH (ref 3.8–10.5)
WBC UR QL: ABNORMAL

## 2021-09-07 PROCEDURE — 71045 X-RAY EXAM CHEST 1 VIEW: CPT | Mod: 26,77

## 2021-09-07 PROCEDURE — 70450 CT HEAD/BRAIN W/O DYE: CPT | Mod: 26,ME

## 2021-09-07 PROCEDURE — 36556 INSERT NON-TUNNEL CV CATH: CPT | Mod: 59

## 2021-09-07 PROCEDURE — 36680 INSERT NEEDLE BONE CAVITY: CPT

## 2021-09-07 PROCEDURE — G1004: CPT

## 2021-09-07 PROCEDURE — 74176 CT ABD & PELVIS W/O CONTRAST: CPT | Mod: 26,MA

## 2021-09-07 PROCEDURE — 99291 CRITICAL CARE FIRST HOUR: CPT | Mod: CS,25

## 2021-09-07 PROCEDURE — 71045 X-RAY EXAM CHEST 1 VIEW: CPT | Mod: 26,76

## 2021-09-07 PROCEDURE — 71250 CT THORAX DX C-: CPT | Mod: 26,MA

## 2021-09-07 PROCEDURE — 99232 SBSQ HOSP IP/OBS MODERATE 35: CPT

## 2021-09-07 RX ORDER — SODIUM CHLORIDE 9 MG/ML
1000 INJECTION INTRAMUSCULAR; INTRAVENOUS; SUBCUTANEOUS
Refills: 0 | Status: DISCONTINUED | OUTPATIENT
Start: 2021-09-07 | End: 2021-09-07

## 2021-09-07 RX ORDER — DEXTROSE 50 % IN WATER 50 %
25 SYRINGE (ML) INTRAVENOUS ONCE
Refills: 0 | Status: DISCONTINUED | OUTPATIENT
Start: 2021-09-07 | End: 2021-09-15

## 2021-09-07 RX ORDER — INSULIN LISPRO 100/ML
VIAL (ML) SUBCUTANEOUS AT BEDTIME
Refills: 0 | Status: DISCONTINUED | OUTPATIENT
Start: 2021-09-07 | End: 2021-09-07

## 2021-09-07 RX ORDER — FLUCONAZOLE 150 MG/1
100 TABLET ORAL DAILY
Refills: 0 | Status: DISCONTINUED | OUTPATIENT
Start: 2021-09-07 | End: 2021-09-11

## 2021-09-07 RX ORDER — METOPROLOL TARTRATE 50 MG
25 TABLET ORAL EVERY 12 HOURS
Refills: 0 | Status: DISCONTINUED | OUTPATIENT
Start: 2021-09-07 | End: 2021-09-07

## 2021-09-07 RX ORDER — PIPERACILLIN AND TAZOBACTAM 4; .5 G/20ML; G/20ML
3.38 INJECTION, POWDER, LYOPHILIZED, FOR SOLUTION INTRAVENOUS ONCE
Refills: 0 | Status: DISCONTINUED | OUTPATIENT
Start: 2021-09-07 | End: 2021-09-07

## 2021-09-07 RX ORDER — SODIUM CHLORIDE 9 MG/ML
1000 INJECTION INTRAMUSCULAR; INTRAVENOUS; SUBCUTANEOUS ONCE
Refills: 0 | Status: COMPLETED | OUTPATIENT
Start: 2021-09-07 | End: 2021-09-07

## 2021-09-07 RX ORDER — HEPARIN SODIUM 5000 [USP'U]/ML
5000 INJECTION INTRAVENOUS; SUBCUTANEOUS EVERY 8 HOURS
Refills: 0 | Status: DISCONTINUED | OUTPATIENT
Start: 2021-09-07 | End: 2021-09-09

## 2021-09-07 RX ORDER — SODIUM CHLORIDE 9 MG/ML
1000 INJECTION, SOLUTION INTRAVENOUS
Refills: 0 | Status: DISCONTINUED | OUTPATIENT
Start: 2021-09-07 | End: 2021-09-08

## 2021-09-07 RX ORDER — INSULIN GLARGINE 100 [IU]/ML
36 INJECTION, SOLUTION SUBCUTANEOUS AT BEDTIME
Refills: 0 | Status: DISCONTINUED | OUTPATIENT
Start: 2021-09-07 | End: 2021-09-15

## 2021-09-07 RX ORDER — FENTANYL CITRATE 50 UG/ML
1 INJECTION INTRAVENOUS
Refills: 0 | Status: DISCONTINUED | OUTPATIENT
Start: 2021-09-07 | End: 2021-09-13

## 2021-09-07 RX ORDER — SUCRALFATE 1 G
1 TABLET ORAL
Refills: 0 | Status: DISCONTINUED | OUTPATIENT
Start: 2021-09-07 | End: 2021-09-15

## 2021-09-07 RX ORDER — MEROPENEM 1 G/30ML
1000 INJECTION INTRAVENOUS ONCE
Refills: 0 | Status: COMPLETED | OUTPATIENT
Start: 2021-09-07 | End: 2021-09-07

## 2021-09-07 RX ORDER — DEXTROSE 50 % IN WATER 50 %
12.5 SYRINGE (ML) INTRAVENOUS ONCE
Refills: 0 | Status: DISCONTINUED | OUTPATIENT
Start: 2021-09-07 | End: 2021-09-15

## 2021-09-07 RX ORDER — IPRATROPIUM/ALBUTEROL SULFATE 18-103MCG
3 AEROSOL WITH ADAPTER (GRAM) INHALATION EVERY 6 HOURS
Refills: 0 | Status: DISCONTINUED | OUTPATIENT
Start: 2021-09-07 | End: 2021-09-15

## 2021-09-07 RX ORDER — PIPERACILLIN AND TAZOBACTAM 4; .5 G/20ML; G/20ML
3.38 INJECTION, POWDER, LYOPHILIZED, FOR SOLUTION INTRAVENOUS ONCE
Refills: 0 | Status: COMPLETED | OUTPATIENT
Start: 2021-09-07 | End: 2021-09-07

## 2021-09-07 RX ORDER — SODIUM CHLORIDE 9 MG/ML
1000 INJECTION, SOLUTION INTRAVENOUS
Refills: 0 | Status: DISCONTINUED | OUTPATIENT
Start: 2021-09-07 | End: 2021-09-15

## 2021-09-07 RX ORDER — ENOXAPARIN SODIUM 100 MG/ML
30 INJECTION SUBCUTANEOUS DAILY
Refills: 0 | Status: DISCONTINUED | OUTPATIENT
Start: 2021-09-07 | End: 2021-09-07

## 2021-09-07 RX ORDER — ACETAMINOPHEN 500 MG
650 TABLET ORAL EVERY 6 HOURS
Refills: 0 | Status: DISCONTINUED | OUTPATIENT
Start: 2021-09-07 | End: 2021-09-15

## 2021-09-07 RX ORDER — METOCLOPRAMIDE HCL 10 MG
10 TABLET ORAL ONCE
Refills: 0 | Status: COMPLETED | OUTPATIENT
Start: 2021-09-07 | End: 2021-09-07

## 2021-09-07 RX ORDER — ACETAMINOPHEN 500 MG
650 TABLET ORAL ONCE
Refills: 0 | Status: COMPLETED | OUTPATIENT
Start: 2021-09-07 | End: 2021-09-07

## 2021-09-07 RX ORDER — SENNA PLUS 8.6 MG/1
2 TABLET ORAL AT BEDTIME
Refills: 0 | Status: DISCONTINUED | OUTPATIENT
Start: 2021-09-07 | End: 2021-09-15

## 2021-09-07 RX ORDER — PANTOPRAZOLE SODIUM 20 MG/1
40 TABLET, DELAYED RELEASE ORAL DAILY
Refills: 0 | Status: DISCONTINUED | OUTPATIENT
Start: 2021-09-07 | End: 2021-09-08

## 2021-09-07 RX ORDER — PIPERACILLIN AND TAZOBACTAM 4; .5 G/20ML; G/20ML
3.38 INJECTION, POWDER, LYOPHILIZED, FOR SOLUTION INTRAVENOUS EVERY 12 HOURS
Refills: 0 | Status: DISCONTINUED | OUTPATIENT
Start: 2021-09-07 | End: 2021-09-12

## 2021-09-07 RX ORDER — DEXTROSE 50 % IN WATER 50 %
15 SYRINGE (ML) INTRAVENOUS ONCE
Refills: 0 | Status: DISCONTINUED | OUTPATIENT
Start: 2021-09-07 | End: 2021-09-15

## 2021-09-07 RX ORDER — CHLORHEXIDINE GLUCONATE 213 G/1000ML
1 SOLUTION TOPICAL
Refills: 0 | Status: DISCONTINUED | OUTPATIENT
Start: 2021-09-07 | End: 2021-09-08

## 2021-09-07 RX ORDER — SODIUM CHLORIDE 9 MG/ML
10 INJECTION INTRAMUSCULAR; INTRAVENOUS; SUBCUTANEOUS
Refills: 0 | Status: DISCONTINUED | OUTPATIENT
Start: 2021-09-07 | End: 2021-09-15

## 2021-09-07 RX ORDER — METOPROLOL TARTRATE 50 MG
25 TABLET ORAL DAILY
Refills: 0 | Status: DISCONTINUED | OUTPATIENT
Start: 2021-09-07 | End: 2021-09-07

## 2021-09-07 RX ORDER — INSULIN LISPRO 100/ML
VIAL (ML) SUBCUTANEOUS EVERY 6 HOURS
Refills: 0 | Status: DISCONTINUED | OUTPATIENT
Start: 2021-09-07 | End: 2021-09-09

## 2021-09-07 RX ORDER — SODIUM CHLORIDE 9 MG/ML
2000 INJECTION INTRAMUSCULAR; INTRAVENOUS; SUBCUTANEOUS ONCE
Refills: 0 | Status: COMPLETED | OUTPATIENT
Start: 2021-09-07 | End: 2021-09-07

## 2021-09-07 RX ORDER — INSULIN LISPRO 100/ML
VIAL (ML) SUBCUTANEOUS
Refills: 0 | Status: DISCONTINUED | OUTPATIENT
Start: 2021-09-07 | End: 2021-09-07

## 2021-09-07 RX ORDER — LACTOBACILLUS ACIDOPHILUS 100MM CELL
2 CAPSULE ORAL DAILY
Refills: 0 | Status: DISCONTINUED | OUTPATIENT
Start: 2021-09-07 | End: 2021-09-15

## 2021-09-07 RX ORDER — POLYETHYLENE GLYCOL 3350 17 G/17G
17 POWDER, FOR SOLUTION ORAL DAILY
Refills: 0 | Status: DISCONTINUED | OUTPATIENT
Start: 2021-09-07 | End: 2021-09-15

## 2021-09-07 RX ORDER — SODIUM CHLORIDE 9 MG/ML
1000 INJECTION, SOLUTION INTRAVENOUS
Refills: 0 | Status: DISCONTINUED | OUTPATIENT
Start: 2021-09-07 | End: 2021-09-11

## 2021-09-07 RX ORDER — NOREPINEPHRINE BITARTRATE/D5W 8 MG/250ML
0.05 PLASTIC BAG, INJECTION (ML) INTRAVENOUS
Qty: 16 | Refills: 0 | Status: DISCONTINUED | OUTPATIENT
Start: 2021-09-07 | End: 2021-09-10

## 2021-09-07 RX ORDER — ASPIRIN/CALCIUM CARB/MAGNESIUM 324 MG
81 TABLET ORAL DAILY
Refills: 0 | Status: DISCONTINUED | OUTPATIENT
Start: 2021-09-07 | End: 2021-09-15

## 2021-09-07 RX ORDER — GLUCAGON INJECTION, SOLUTION 0.5 MG/.1ML
1 INJECTION, SOLUTION SUBCUTANEOUS ONCE
Refills: 0 | Status: DISCONTINUED | OUTPATIENT
Start: 2021-09-07 | End: 2021-09-15

## 2021-09-07 RX ORDER — SODIUM CHLORIDE 9 MG/ML
1000 INJECTION, SOLUTION INTRAVENOUS ONCE
Refills: 0 | Status: COMPLETED | OUTPATIENT
Start: 2021-09-07 | End: 2021-09-07

## 2021-09-07 RX ADMIN — SODIUM CHLORIDE 1000 MILLILITER(S): 9 INJECTION INTRAMUSCULAR; INTRAVENOUS; SUBCUTANEOUS at 11:31

## 2021-09-07 RX ADMIN — SODIUM CHLORIDE 1000 MILLILITER(S): 9 INJECTION INTRAMUSCULAR; INTRAVENOUS; SUBCUTANEOUS at 12:55

## 2021-09-07 RX ADMIN — Medication 3 MILLILITER(S): at 14:47

## 2021-09-07 RX ADMIN — Medication 0.5 MILLIGRAM(S): at 15:31

## 2021-09-07 RX ADMIN — PIPERACILLIN AND TAZOBACTAM 3.38 GRAM(S): 4; .5 INJECTION, POWDER, LYOPHILIZED, FOR SOLUTION INTRAVENOUS at 11:31

## 2021-09-07 RX ADMIN — FENTANYL CITRATE 1 PATCH: 50 INJECTION INTRAVENOUS at 19:34

## 2021-09-07 RX ADMIN — ENOXAPARIN SODIUM 30 MILLIGRAM(S): 100 INJECTION SUBCUTANEOUS at 16:43

## 2021-09-07 RX ADMIN — MEROPENEM 1000 MILLIGRAM(S): 1 INJECTION INTRAVENOUS at 12:31

## 2021-09-07 RX ADMIN — PANTOPRAZOLE SODIUM 40 MILLIGRAM(S): 20 TABLET, DELAYED RELEASE ORAL at 16:43

## 2021-09-07 RX ADMIN — Medication 3 MILLILITER(S): at 20:35

## 2021-09-07 RX ADMIN — Medication 1 GRAM(S): at 23:07

## 2021-09-07 RX ADMIN — Medication 650 MILLIGRAM(S): at 10:42

## 2021-09-07 RX ADMIN — SODIUM CHLORIDE 1000 MILLILITER(S): 9 INJECTION, SOLUTION INTRAVENOUS at 15:34

## 2021-09-07 RX ADMIN — Medication 10 MILLIGRAM(S): at 22:38

## 2021-09-07 RX ADMIN — HEPARIN SODIUM 5000 UNIT(S): 5000 INJECTION INTRAVENOUS; SUBCUTANEOUS at 22:10

## 2021-09-07 RX ADMIN — FLUCONAZOLE 100 MILLIGRAM(S): 150 TABLET ORAL at 16:43

## 2021-09-07 RX ADMIN — Medication 2.77 MICROGRAM(S)/KG/MIN: at 12:55

## 2021-09-07 RX ADMIN — Medication 650 MILLIGRAM(S): at 11:31

## 2021-09-07 RX ADMIN — Medication 1 GRAM(S): at 16:44

## 2021-09-07 RX ADMIN — PIPERACILLIN AND TAZOBACTAM 25 GRAM(S): 4; .5 INJECTION, POWDER, LYOPHILIZED, FOR SOLUTION INTRAVENOUS at 16:05

## 2021-09-07 RX ADMIN — SODIUM CHLORIDE 2000 MILLILITER(S): 9 INJECTION INTRAMUSCULAR; INTRAVENOUS; SUBCUTANEOUS at 12:55

## 2021-09-07 RX ADMIN — SODIUM CHLORIDE 75 MILLILITER(S): 9 INJECTION, SOLUTION INTRAVENOUS at 23:24

## 2021-09-07 RX ADMIN — Medication 81 MILLIGRAM(S): at 16:43

## 2021-09-07 RX ADMIN — MEROPENEM 100 MILLIGRAM(S): 1 INJECTION INTRAVENOUS at 11:30

## 2021-09-07 RX ADMIN — SODIUM CHLORIDE 1000 MILLILITER(S): 9 INJECTION INTRAMUSCULAR; INTRAVENOUS; SUBCUTANEOUS at 10:15

## 2021-09-07 RX ADMIN — PIPERACILLIN AND TAZOBACTAM 200 GRAM(S): 4; .5 INJECTION, POWDER, LYOPHILIZED, FOR SOLUTION INTRAVENOUS at 10:15

## 2021-09-07 RX ADMIN — Medication 2 TABLET(S): at 16:43

## 2021-09-07 RX ADMIN — SODIUM CHLORIDE 150 MILLILITER(S): 9 INJECTION, SOLUTION INTRAVENOUS at 16:38

## 2021-09-07 RX ADMIN — INSULIN GLARGINE 36 UNIT(S): 100 INJECTION, SOLUTION SUBCUTANEOUS at 22:38

## 2021-09-07 RX ADMIN — Medication 2: at 17:42

## 2021-09-07 NOTE — ED PROCEDURE NOTE - PROCEDURE ADDITIONAL DETAILS
Emergent IV access for vaso pressor attempted R side subclavian attempted L internal jugular ultrasound guided, anatomically guided on R unsuccessfully. no complications waiting CXR to done after NG tube placement.

## 2021-09-07 NOTE — ED PROVIDER NOTE - PROGRESS NOTE DETAILS
case ana Sandoval and CITLALI Valenzuela All results were explained to patient and/or family and a copy of all available results given to pt's

## 2021-09-07 NOTE — PROCEDURE NOTE - NSPOSTPRCRAD_GEN_A_CORE
central line located in the/central line located in the superior vena cava/depth of insertion/line adjusted to depth of insertion/no pneumothorax/post-procedure radiography performed

## 2021-09-07 NOTE — ED PROCEDURE NOTE - CPROC ED INFUS LINE DETAIL1
The location was identified, and the area was draped and prepped./Ultrasound guidance was used during placement.

## 2021-09-07 NOTE — ED ADULT NURSE NOTE - BEFAST ARM NUMBNESS
Urology Consult History and Physical    Name: Donnie Vincent    MRN: [id number]   YOB: 1945       We were asked to see Donnie Vincent at the request of Dr. Estrada for evaluation and treatment of hematuria.        Chief Complaint:   Gross Hematuria          History of Present Illness:   Donnie Vincent is a 72 year old male who is being seen for evaluation of gross hematuria. He is s/p TURP with Dr. Banuelos completed 10/3/2018. Post-op course was complicated by hematuria and transfusion was required in the maria l-operative period. He had his catheter removed several days ago and initially did well at home. Has since restarted his Plavix. Yesterday, began noting hematuria and came to ED last PM with significant hematuria. Sanabira inserted and CBI started.    Urine is cherry this AM on fast CBI drip. HGB and vitals are stable. No fevers/chills.            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     COPD (chronic obstructive pulmonary disease) (H)      CVA (cerebral infarction)      Diabetes new 4/09    initial A1C 6.8 4/08;; has done diabetic teaching      Diverticulitis of colon (without mention of hemorrhage)(562.11) 2001; 9/06     Gastro-oesophageal reflux disease      Hernia, abdominal      History of blood transfusion      HTN (hypertension)      Hyperlipidemia LDL goal < 100      Other forms of migraine, without mention of intractable migraine without mention of status migrainosus      PFO (patent foramen ovale)     small per echo 11/2013     Renal disease     Hx kidney stones 12 yrs ago     Seizure disorder (H)     sees neurologist     Seizures (H)     8 years ago - no recurrence     Sleep apnea     wears CPAP     Stroke (H) early april 2017     Unspecified congenital anomaly of lung     has some benign  granulomas in lungs possibly from asbestos exposure in [employer] (remote)             Past Surgical History:     Past Surgical History:   Procedure Laterality Date     C NONSPECIFIC PROCEDURE      colonoscopy 2005     COLONOSCOPY  11/4/2015    Dr. Ernesto CONTRERAS     COLONOSCOPY N/A 11/4/2015    Procedure: COLONOSCOPY;  Surgeon: Yazmin Orozco MD;  Location:  GI     CYSTOSCOPY, RETROGRADES, EXTRACT STONE, COMBINED Left 11/25/2015    Procedure: COMBINED CYSTOSCOPY, RETROGRADES, EXTRACT STONE;  Surgeon: Neville Banuelos MD;  Location: [facility] OR     CYSTOSCOPY, TRANSURETHRAL RESECTION (TUR) PROSTATE, COMBINED N/A 10/3/2018    Procedure: COMBINED CYSTOSCOPY, TRANSURETHRAL RESECTION (TUR) PROSTATE;  Cystoscopy, removal of bladder stones with holmium laser, transurethral resection of prostate using Olympus bipolar electrode;  Surgeon: Neville Banuelos MD;  Location:  OR     ESOPHAGOSCOPY, GASTROSCOPY, DUODENOSCOPY (EGD), COMBINED  11/5/2015    Dr. Ernesto CONTRERAS     ESOPHAGOSCOPY, GASTROSCOPY, DUODENOSCOPY (EGD), COMBINED  06/27/2018    Dr. Ernesto CONTRERAS     GENITOURINARY SURGERY      kidney stone - lithotripsy     HERNIA REPAIR       LASER HOLMIUM LITHOTRIPSY BLADDER N/A 10/3/2018    Procedure: LASER HOLMIUM LITHOTRIPSY BLADDER;;  Surgeon: Neville Banuelos MD;  Location:  OR     PHACOEMULSIFICATION CLEAR CORNEA WITH STANDARD INTRAOCULAR LENS IMPLANT  12/20/2011    Procedure:PHACOEMULSIFICATION CLEAR CORNEA WITH STANDARD INTRAOCULAR LENS IMPLANT; RIGHT PHACOEMULSIFICATION CLEAR CORNEA WITH STANDARD INTRAOCULAR LENS IMPLANT ; Surgeon:GUILHERME KAUFMAN; Location:[facility]            Social History:     Social History   Substance Use Topics     Smoking status: Former Smoker     Quit date: 1/1/1970     Smokeless tobacco: Never Used      Comment: quit age 28     Alcohol use Yes      Comment: 1 beer/week            Family History:     Family History   Problem Relation Age of Onset     Family History Negative Mother       migraines     Family History Negative Father      lived to be 92     Colon Cancer No family hx of               Allergies:     Allergies   Allergen Reactions     Penicillins      Sore joints and he had to be hospitalized for it            Medications:     Current Facility-Administered Medications   Medication     acetaminophen (TYLENOL) tablet 650 mg     albuterol (PROAIR HFA/PROVENTIL HFA/VENTOLIN HFA) 108 (90 Base) MCG/ACT inhaler 2 puff     bisacodyl (DULCOLAX) EC tablet 5 mg    Or     bisacodyl (DULCOLAX) EC tablet 10 mg    Or     bisacodyl (DULCOLAX) EC tablet 15 mg     bisacodyl (DULCOLAX) Suppository 10 mg     glucose gel 15-30 g    Or     dextrose 50 % injection 25-50 mL    Or     glucagon injection 1 mg     fenofibrate tablet 160 mg     ferrous sulfate (IRON) tablet 325 mg     FLUoxetine (PROzac) capsule 40 mg     fluticasone-vilanterol (BREO ELLIPTA) 100-25 MCG/INH inhaler 1 puff     levETIRAcetam (KEPPRA) tablet 500 mg     lidocaine (LMX4) cream     lidocaine 1 % 1 mL     magnesium hydroxide (MILK OF MAGNESIA) suspension 30 mL     melatonin tablet 1 mg     metoprolol succinate (TOPROL-XL) 24 hr tablet 50 mg     naloxone (NARCAN) injection 0.1-0.4 mg     ondansetron (ZOFRAN-ODT) ODT tab 4 mg    Or     ondansetron (ZOFRAN) injection 4 mg     opium-belladonna (B&O SUPPRETTES) 30-16.2 MG per suppository 1 suppository     pantoprazole (PROTONIX) EC tablet 40 mg     simvastatin (ZOCOR) tablet 10 mg     sodium chloride (PF) 0.9% PF flush 3 mL     sodium chloride (PF) 0.9% PF flush 3 mL     sodium chloride 0.9% (bag) irrigation     sodium chloride 0.9% infusion     tamsulosin (FLOMAX) capsule 0.4 mg     topiramate (TOPAMAX) tablet 50 mg     Facility-Administered Medications Ordered in Other Encounters   Medication     gadobutrol (GADAVIST) injection 15 mL             Review of Systems:    ROS: 10 point ROS neg other than the symptoms noted above in the HPI.          Physical Exam:   Patient Vitals for the past 24  "hrs:   BP Temp Temp src Pulse Heart Rate Resp SpO2 Height Weight   10/18/18 0832 118/56 - - - 75 - - - -   10/18/18 0821 - - - - 83 18 98 % - -   10/18/18 0808 115/64 97.4  F (36.3  C) Oral - 79 18 98 % - -   10/18/18 0256 118/60 97.3  F (36.3  C) Oral - 87 18 97 % - -   10/17/18 2321 151/82 97.8  F (36.6  C) Oral - 108 18 97 % 1.803 m (5' 11\") 109.3 kg (241 lb)   10/17/18 2245 (!) 151/91 - - - - - 97 % - -   10/17/18 2230 148/84 - - - - - 98 % - -   10/17/18 2215 131/85 - - - - - 97 % - -   10/17/18 2200 (!) 150/98 - - - - - 99 % - -   10/17/18 2145 (!) 156/96 - - - - - 97 % - -   10/17/18 2130 (!) 158/95 - - - - - 98 % - -   10/17/18 2115 (!) 153/92 - - - - - 98 % - -   10/17/18 2100 145/90 - - - - - 98 % - -   10/17/18 2045 167/87 - - - - - 98 % - -   10/17/18 2030 (!) 148/91 - - - - - 99 % - -   10/17/18 1956 (!) 172/108 98.6  F (37  C) - 115 115 18 99 % - -     General: age-appropriate appearing male in NAD  HEENT: Head AT/NC, EOMI, CN Grossly intact  Lungs: no respiratory distress, or pursed lip breathing  Heart: No obvious jugular venous distension present  Back: no bony midline tenderness, no CVAT bilaterally.  Abdomen: soft, non-distended, non-tender. No organomegaly  : normal male external genitalia.     Sanabria catheter with cherry output on brisk CBI  Lymph: no palpable inguinal lymphadenopathy.  LE: no edema. pneumoboots in place.  Musculoskeltal: extremities normal, no peripheral edema  Skin: no suspicious lesions or rashes  Neuro: Alert, oriented, speech and mentation normal;  moving all 4 extremities equally.  Psych: affect and mood normal          Data:   All laboratory data reviewed:      Recent Labs  Lab 10/18/18  0705 10/17/18  2251 10/17/18  2037 10/11/18  1225   WBC 10.8  --  8.6 7.5   HGB 8.9* 9.4* 10.2* 9.2*     --  497* 312       Recent Labs  Lab 10/18/18  0705 10/17/18  2037 10/11/18  1225    141 142   POTASSIUM 4.1 4.2 3.6   CHLORIDE 113* 112* 113*   CO2 19* 20 20   BUN 23 24 " 15   CR 1.34* 1.47* 1.51*   * 281* 133*   ZAYRA 8.0* 8.2* 8.5       Recent Labs  Lab 10/17/18  2222   COLOR Red   APPEARANCE Cloudy   URINEGLC 500*   URINEBILI Negative   URINEKETONE Negative   SG 1.015   URINEPH 7.0   PROTEIN >499*   NITRITE Negative   LEUKEST Negative   RBCU >182*   WBCU >182*          Impression and Plan:   Impression:   72 year old male approximately 2 weeks s/p TURP. Restarted anticoagulation and now re-admitted with hematuria. Hand irrigated at bedside for considerable clot burden. CBI restarted and flowing clear, initially. Discussed options of OR for cysto and possible fulguration, vs ongoing CBI and observation to see of bleeding will stop spontaneously. Patient would prefer to go to OR. Will tentatively add for this afternoon and will re-assess urine in a few hours.      Plan:   -Continue Sanabria and CBI - hand irrigate prn for clots or obstruction  - NPO  - Add to OR for this afternoon for cystoscopy, clot evacuation, fulguration     Renato Das MD  Urology  [facility]  Clinic Phone [phone]          No

## 2021-09-07 NOTE — H&P ADULT - NSHPLABSRESULTS_GEN_ALL_CORE
11.6   26.45 )-----------( 362      ( 07 Sep 2021 10:38 )             40.1     09-07    140  |  90<L>  |  100<H>  ----------------------------<  153<H>  4.0   |  27  |  2.58<H>    Ca    10.0      07 Sep 2021 10:56    TPro  9.4<H>  /  Alb  3.3  /  TBili  0.7  /  DBili  x   /  AST  205<H>  /  ALT  81<H>  /  AlkPhos  133<H>  09-07      UA = moderate leuk esterase, WBC 26-50      EXAM:  CT BRAIN                            PROCEDURE DATE:  09/07/2021          INTERPRETATION:  CLINICAL INDICATION: Dementia, Alzheimer's suspected.    TECHNIQUE: CT of the head was performed without the administration of intravenous contrast.    COMPARISON: CT head 5/29/2020.    FINDINGS:  Severe diffuse parenchymal atrophy is again noted.    No acute intracranial hemorrhage. Ex vacuo dilatation of the lateral ventricles.    White matter hypoattenuating foci are noted, compatible with age-indeterminate small vessel disease.    No extra-axial fluid collections.    The visualized intraorbital contents are unremarkable. Air-fluid level in the right sphenoid sinus. The mastoid air cells are clear. The visualized soft tissues and osseous structures appear normal.    IMPRESSION:    -Severe diffuse parenchymal atrophy again noted. No acute intracranial hemorrhage.    -Possible acute right sphenoid sinusitis. 11.6   26.45 )-----------( 362      ( 07 Sep 2021 10:38 )             40.1     09-07    140  |  90<L>  |  100<H>  ----------------------------<  153<H>  4.0   |  27  |  2.58<H>    Ca    10.0      07 Sep 2021 10:56    TPro  9.4<H>  /  Alb  3.3  /  TBili  0.7  /  DBili  x   /  AST  205<H>  /  ALT  81<H>  /  AlkPhos  133<H>  09-07      UA = moderate leuk esterase, WBC 26-50    EKG: sinus tachy to 104, RBBB    EXAM:  CT BRAIN                            PROCEDURE DATE:  09/07/2021          INTERPRETATION:  CLINICAL INDICATION: Dementia, Alzheimer's suspected.    TECHNIQUE: CT of the head was performed without the administration of intravenous contrast.    COMPARISON: CT head 5/29/2020.    FINDINGS:  Severe diffuse parenchymal atrophy is again noted.    No acute intracranial hemorrhage. Ex vacuo dilatation of the lateral ventricles.    White matter hypoattenuating foci are noted, compatible with age-indeterminate small vessel disease.    No extra-axial fluid collections.    The visualized intraorbital contents are unremarkable. Air-fluid level in the right sphenoid sinus. The mastoid air cells are clear. The visualized soft tissues and osseous structures appear normal.    IMPRESSION:    -Severe diffuse parenchymal atrophy again noted. No acute intracranial hemorrhage.    -Possible acute right sphenoid sinusitis. 11.6   26.45 )-----------( 362      ( 07 Sep 2021 10:38 )             40.1     09-07    140  |  90<L>  |  100<H>  ----------------------------<  153<H>  4.0   |  27  |  2.58<H>    Ca    10.0      07 Sep 2021 10:56    TPro  9.4<H>  /  Alb  3.3  /  TBili  0.7  /  DBili  x   /  AST  205<H>  /  ALT  81<H>  /  AlkPhos  133<H>  09-07      UA = moderate leuk esterase, WBC 26-50    EKG: sinus tachy to 104, RBBB    EXAM:  CT BRAIN                          IMPRESSION:    -Severe diffuse parenchymal atrophy again noted. No acute intracranial hemorrhage.    -Possible acute right sphenoid sinusitis.      EXAM:  CT CHEST                                  PROCEDURE DATE:  09/07/2021          INTERPRETATION:  CLINICAL INFORMATION: Post cardiac arrest    COMPARISON: 6/15/2021    CONTRAST/COMPLICATIONS:  IV Contrast: NONE  0 cc administered   0 cc discarded  Oral Contrast: NONE  Complications: None reported at time of study completion    PROCEDURE:  CT of the Chest, Abdomen and Pelvis was performed.  Sagittal and coronal reformats were performed.    FINDINGS:  CHEST:  LUNGS AND LARGE AIRWAYS: Patent central airways. Tracheostomy cannula in position unchanged. There are retained secretions in the trachea. Bibasilar dependent consolidation with air bronchograms representing aspiration pneumonia and atelectasis.  PLEURA: No pleural effusion.  VESSELS: Nonaneurysmal  HEART: Heart size is normal, with cardiac vascular calcification. No pericardial effusion.  MEDIASTINUM AND JHONNY: Fluid and gaseous distention of the thoracic esophagus suggesting gastroesophageal reflux.  CHEST WALL AND LOWER NECK: Within normal limits.    ABDOMEN AND PELVIS:  LIVER: Within normal limits.  BILE DUCTS: Normal caliber.  GALLBLADDER: Distended without calcified stones.  SPLEEN: Within normal limits.  PANCREAS: Within normal limits.  ADRENALS: Bilateral thickened adrenals similar to prior  KIDNEYS/URETERS: Within normal limits.    BLADDER: Decompressed with Woody.  REPRODUCTIVE ORGANS: No masses    BOWEL: No bowel obstruction. Mild ileus transverse right colon. No mechanical obstruction. Marked fluid distention of the rectal vault. Gastrostomy tube in the stomach. Appendix no appendicitis  PERITONEUM: No ascites.  VESSELS: Nonaneurysmal.  RETROPERITONEUM/LYMPH NODES: No lymphadenopathy.  ABDOMINAL WALL: Within normal limits.  BONES: Stable.    IMPRESSION:  Bibasilar aspiration pneumonia and atelectasis.  Fluid and gaseous distention of the thoracic esophagus suggesting gastroesophageal reflux  Mild colonic ileus.  Marked fluid distention of the rectum  Additional findings as discussed

## 2021-09-07 NOTE — ED PROCEDURE NOTE - NS_ ATTENDINGSCRIBEDETAILS _ED_A_ED_FT
Max Merrill MD - The scribe's documentation has been prepared under my direction and personally reviewed by me in its entirety. I confirm that the note above accurately reflects all work, treatment, procedures, and medical decision making performed by me.
Max Merrill MD - The scribe's documentation has been prepared under my direction and personally reviewed by me in its entirety. I confirm that the note above accurately reflects all work, treatment, procedures, and medical decision making performed by me.

## 2021-09-07 NOTE — ED PROVIDER NOTE - NSICDXPASTMEDICALHX_GEN_ALL_CORE_FT
PAST MEDICAL HISTORY:  Advanced dementia     Aphasic stroke     Constipation     COVID-19 virus detected     CVA (cerebral vascular accident)     Dementia of frontal lobe type     Diabetes mellitus     DM (diabetes mellitus)     GERD (gastroesophageal reflux disease)     HTN (hypertension)     Hypertension     Pneumonia     Quadriplegia     Respiratory failure     Respiratory failure     Type II diabetes mellitus

## 2021-09-07 NOTE — ED PROVIDER NOTE - CARE PLAN
1 Principal Discharge DX:	Sepsis  Secondary Diagnosis:	Cardiac arrest   Principal Discharge DX:	Sepsis  Secondary Diagnosis:	Cardiac arrest  Secondary Diagnosis:	RYAN (acute kidney injury)  Secondary Diagnosis:	Pneumonia, aspiration

## 2021-09-07 NOTE — H&P ADULT - NSHPPHYSICALEXAM_GEN_ALL_CORE
VITAL SIGNS:    Vital Signs Last 24 Hrs  T(C): 37.2 (07 Sep 2021 11:31), Max: 38.4 (07 Sep 2021 09:55)  T(F): 99 (07 Sep 2021 11:31), Max: 101.1 (07 Sep 2021 09:55)  HR: 72 (07 Sep 2021 11:31) (72 - 130)  BP: 55/33 (07 Sep 2021 11:31) (55/33 - 184/66)  BP(mean): 36 (07 Sep 2021 11:31) (36 - 79)  RR: 35 (07 Sep 2021 11:31) (35 - 40)  SpO2: 97% (07 Sep 2021 11:31) (97% - 100%)    PHYSICAL EXAM:     GENERAL: no acute distress  HEENT: NC/AT, EOMI, neck supple, MMM  RESPIRATORY: LCTAB/L, no rhonchi, rales, or wheezing  CARDIOVASCULAR: RRR, no murmurs, gallops, rubs  ABDOMINAL: soft, non-tender, non-distended, positive bowel sounds   EXTREMITIES: no clubbing, cyanosis, or edema  NEUROLOGICAL: alert and oriented x 3, non-focal  SKIN: no rashes or lesions   MUSCULOSKELETAL: no gross joint deformity VITAL SIGNS:    Vital Signs Last 24 Hrs  T(C): 37.2 (07 Sep 2021 11:31), Max: 38.4 (07 Sep 2021 09:55)  T(F): 99 (07 Sep 2021 11:31), Max: 101.1 (07 Sep 2021 09:55)  HR: 72 (07 Sep 2021 11:31) (72 - 130)  BP: 55/33 (07 Sep 2021 11:31) (55/33 - 184/66)  BP(mean): 36 (07 Sep 2021 11:31) (36 - 79)  RR: 35 (07 Sep 2021 11:31) (35 - 40)  SpO2: 97% (07 Sep 2021 11:31) (97% - 100%)    PHYSICAL EXAM:     GENERAL: not alert  HEENT: trached  RESPIRATORY: decreased and coarse bs b/l  CARDIOVASCULAR:  s1 and s2, rrr  ABDOMINAL: soft, nd, + PEG  EXTREMITIES: no c/c/e  NEUROLOGICAL: not alert   MUSCULOSKELETAL: no gross joint deformity

## 2021-09-07 NOTE — PROGRESS NOTE ADULT - ASSESSMENT
74 yo f pmhx dementia, CVA, VDRF s/p trach/peg, quadriplegia, DM, HTN, GERD, constipation admitted with     1. Cardiac Arrest  2. Hypoxic Respiratory Failure  3. Pneumonia  4. Septic Shock   5. UTI  6. RYAN    NEURO: Dementia supportive care, MS appears at baseline  CV: Septic shock requiring vasopressor therapy, actively titrating levophed for MAP >65, will wean as tolerated.  Unable to add midodrine in setting fo bradycardia.   RESP: Acute on chronic hypoxic respiratory failure, ac/vc 6cc/kg tv lung protective strategies, actively titrating fio2/peep for spo2 >92%, pneumonia- aggressive chest pt, lavage and suctioning.    RENAL: RYAN, avoid nephrotoxic meds, renally dose meds, trend urine output, bun/cr and electrolytes, replace lytes. Woody in place.   GI: NPO, Peg in place  ENDO: POCT q6hrs while NPO  ID: Zosyn/diflucan for coverage.  Blood/urine/sputum cx pending.  HEME: Lovenox for vte ppx  DISPO: Full code.  On going GOC with family.     Critical Care time: 45 mins assessing presenting problems of acute illness that poses high probability of life threatening deterioration or end organ damage/dysfunction.  Medical decision making including Initiating plan of care, reviewing data, reviewing radiology, discussing with multidisciplinary team, non inclusive of procedures, discussing goals of care with patient/family

## 2021-09-07 NOTE — H&P ADULT - ASSESSMENT
76 y/o f trach dependent, with PEG tube, DM, HTN, GERD, CVA, dementia, constipation p/w being found unresponsive at [facility], brought in by EMS for post cardiac arrest care 74 y/o f trach dependent, with PEG tube, DM, HTN, GERD, CVA, dementia, constipation p/w being found unresponsive at [facility], brought in by EMS for post cardiac arrest care, found to have sepsis, b/l aspiration PNA, and mild colonic ileus

## 2021-09-07 NOTE — ED ADULT NURSE NOTE - OBJECTIVE STATEMENT
76 y/o female received from [facility] for eval, pt noted awake (eyes open) but is non verbal, does not follow verbal commands, all 4 extremities noted contracted. pt very warm to touch, heavily diaphoretic, pupils fixed, non reactive to light.  placed on cardiac monitor, 2 IVL started, bloods drawn and sent to lab. tracheostomy noted, peg tube noted. RT at bedside for vent set up. rectal fever of 101.1f noted, medicated as ordered. vaughn catheter inserted for urine collection and i/o monitoring. pt had 2 heavily watery BM in ED. cleaned by NA staff.

## 2021-09-07 NOTE — ED ADULT NURSE NOTE - CAS TRG GEN SKIN COLOR
Normal for race Calcipotriene Pregnancy And Lactation Text: This medication has not been proven safe during pregnancy. It is unknown if this medication is excreted in breast milk.

## 2021-09-07 NOTE — PATIENT PROFILE ADULT - DO YOU FEEL THREATENED BY OTHERS?
Metronidazole Counseling:  I discussed with the patient the risks of metronidazole including but not limited to seizures, nausea/vomiting, a metallic taste in the mouth, nausea/vomiting and severe allergy. no

## 2021-09-07 NOTE — PROVIDER CONTACT NOTE (EICU) - ASSESSMENT
upon my video assessment, Gris was on a ventilator, not responsive to voice, not moving anything spontaneously (I'm told this Gris's baseline). IO line in place, right hand PIV in place. Told CVC attempted with difficult anatomical placement, therefore will attempt later. Ventilator synchrony is being achieved.     radiology reviewed, CT chest: concerns for ilieus, bilateral atelectasis +/- pneumonia, distended esophagus (h/o GERD). CT head; no ICH, possible sinus inflammation. Urine: concerns for UTI with large WBC, too numerous to count Bacteria, moderate luekocyte esterase. RYAN (Scr 2.58), elevated ALK PHOS and AST. hyperlactemia 13.5-->3.7. leukocytosis 26 with 10% bands

## 2021-09-07 NOTE — H&P ADULT - PROBLEM SELECTOR PLAN 2
Suggestion of bilateral aspiration pneumonia on CT chest  - as per ID consult Dr. Brendon quintanilla, pt started on IV zosyn, will cont for now  - elevated WBC to 22, will trend on CBC, and monitor for fevers  - NPO, pt has peg tube Suggestion of bilateral aspiration pneumonia on CT chest  - as per ID consult Dr. Brendon quintanilla, pt started on IV zosyn, will cont for now  - Hypotension, started on levophed; elevated WBC to 22, will trend on CBC, and monitor for fevers  - NPO, pt has peg tube  - tyleol prn fever, pain Suggestion of bilateral aspiration pneumonia on CT chest  - as per ID consult Dr. Brendon quintanilla, pt started on IV zosyn, will cont for now  - Hypotension, started on levophed; elevated WBC to 22, will trend on CBC, and monitor for fevers  - NPO, pt has peg tube  - tylenol prn fever, pain

## 2021-09-07 NOTE — H&P ADULT - NSHPREVIEWOFSYSTEMS_GEN_ALL_CORE
CONSTITUTIONAL: No weakness, fevers or chills  EYES/ENT: No visual changes, no throat pain   RESPIRATORY: No cough, wheezing, hemoptysis; No shortness of breath  CARDIOVASCULAR: No chest pain or palpitations  GASTROINTESTINAL: No abdominal, nausea, vomiting, or hematemesis; No diarrhea or constipation. No melena or hematochezia.  GENITOURINARY: No dysuria, frequency or hematuria  NEUROLOGICAL: No dizziness, numbness, or weakness  SKIN: No itching, burning, rashes, or lesions Unable to elicit, pt not communicative

## 2021-09-07 NOTE — ED PROVIDER NOTE - OBJECTIVE STATEMENT
BIBEMS from [facility] for ams.  post cardiac arrest for a few minutes as per BLS crew.  Pt is trach dependent.

## 2021-09-07 NOTE — PROGRESS NOTE ADULT - SUBJECTIVE AND OBJECTIVE BOX
76 yo f pmhx dementia, CVA, VDRF s/p trach/peg, quadriplegia, DM, HTN, GERD, constipation biba in from [facility] s/p cardiac arrest.  Per ED documentation, patient with cardiac arrest at [facility] (unknown exact downtime and if medications were administered), patient with ROSC in ED.  Patient admitted to [facility] with septic shock, pneumonia and UTI.      PAST MEDICAL & SURGICAL HISTORY:  Dementia of frontal lobe type  Aphasic stroke  Diabetes mellitus  Respiratory failure  Hypertension  GERD (gastroesophageal reflux disease)  Constipation  Respiratory failure  CVA (cerebral vascular accident)  HTN (hypertension)  DM (diabetes mellitus)  Advanced dementia  COVID-19 virus detected  Quadriplegia  Pneumonia  Type II diabetes mellitus  Hx of appendectomy  Gastrostomy in place  Tracheostomy in place  Tracheostomy tube present  Feeding by G-tube      Allergies  codeine (Hives)      FAMILY HISTORY:  No pertinent family history in first degree relatives        Social History:   From [facility]      Review of Systems:   Unable to obtain 2/2 mental status       Physical Examination:    General: Elderly cachectic female, lying in bed, NAD    HEENT: NC/AT, trach site intact, connected to vent    PULM: Symmetrical thorax expansion upon respiration. Coarse to auscultation bilaterally, +thick sputum production via tracheal suctioning    CVS: Regular rate and rhythm, no murmurs, rubs, or gallops    ABD: Soft, nondistended, nontender, normoactive bowel sounds, no masses appreciated, peg in place    EXT: Upper extremities contracted    SKIN: Warm and well perfused, no rashes noted.    NEURO: Opens eyes to verbal stimulation      Medications:  fluconAZOLE   Tablet 100 milliGRAM(s) Oral daily  piperacillin/tazobactam IVPB.. 3.375 Gram(s) IV Intermittent every 12 hours  metoprolol tartrate 25 milliGRAM(s) Enteral Tube every 12 hours  norepinephrine Infusion 0.05 MICROgram(s)/kG/Min IV Continuous <Continuous>  albuterol/ipratropium for Nebulization 3 milliLiter(s) Nebulizer every 6 hours  acetaminophen   Tablet .. 650 milliGRAM(s) Oral every 6 hours PRN  fentaNYL   Patch  12 MICROgram(s)/Hr 1 Patch Transdermal every 72 hours  LORazepam   Injectable 0.5 milliGRAM(s) IV Push three times a day  aspirin  chewable 81 milliGRAM(s) Enteral Tube daily  enoxaparin Injectable 30 milliGRAM(s) SubCutaneous daily  bisacodyl Suppository 10 milliGRAM(s) Rectal at bedtime  pantoprazole  Injectable 40 milliGRAM(s) IV Push daily  polyethylene glycol 3350 17 Gram(s) Oral daily  senna 2 Tablet(s) Oral at bedtime  sucralfate suspension 1 Gram(s) Oral four times a day  dextrose 40% Gel 15 Gram(s) Oral once  dextrose 50% Injectable 25 Gram(s) IV Push once  dextrose 50% Injectable 12.5 Gram(s) IV Push once  dextrose 50% Injectable 25 Gram(s) IV Push once  glucagon  Injectable 1 milliGRAM(s) IntraMuscular once  insulin glargine Injectable (LANTUS) 36 Unit(s) SubCutaneous at bedtime  insulin lispro (ADMELOG) corrective regimen sliding scale   SubCutaneous three times a day before meals  insulin lispro (ADMELOG) corrective regimen sliding scale   SubCutaneous at bedtime  dextrose 5%. 1000 milliLiter(s) IV Continuous <Continuous>  dextrose 5%. 1000 milliLiter(s) IV Continuous <Continuous>  lactated ringers. 1000 milliLiter(s) IV Continuous <Continuous>  sodium chloride 0.9% lock flush 10 milliLiter(s) IV Push every 1 hour PRN  sodium chloride 0.9%. 1000 milliLiter(s) IV Continuous <Continuous>  chlorhexidine 4% Liquid 1 Application(s) Topical <User Schedule>  lactobacillus acidophilus 2 Tablet(s) Oral daily      Mode: AC/ CMV (Assist Control/ Continuous Mandatory Ventilation)  RR (machine): 14  TV (machine): 400  FiO2: 50  PEEP: 5  ITime: 1  MAP: 9  PIP: 28      ICU Vital Signs Last 24 Hrs  T(C): 36.3 (07 Sep 2021 15:10), Max: 38.4 (07 Sep 2021 09:55)  T(F): 97.4 (07 Sep 2021 15:10), Max: 101.1 (07 Sep 2021 09:55)  HR: 56 (07 Sep 2021 21:05) (47 - 130)  BP: 108/53 (07 Sep 2021 21:05) (55/33 - 184/66)  BP(mean): 70 (07 Sep 2021 21:05) (36 - 83)  ABP: --  ABP(mean): --  RR: 12 (07 Sep 2021 21:05) (0 - 40)  SpO2: 100% (07 Sep 2021 21:05) (93% - 100%)    Vital Signs Last 24 Hrs  T(C): 36.3 (07 Sep 2021 15:10), Max: 38.4 (07 Sep 2021 09:55)  T(F): 97.4 (07 Sep 2021 15:10), Max: 101.1 (07 Sep 2021 09:55)  HR: 56 (07 Sep 2021 21:05) (47 - 130)  BP: 108/53 (07 Sep 2021 21:05) (55/33 - 184/66)  BP(mean): 70 (07 Sep 2021 21:05) (36 - 83)  RR: 12 (07 Sep 2021 21:05) (0 - 40)  SpO2: 100% (07 Sep 2021 21:05) (93% - 100%)      I&O's Detail    07 Sep 2021 07:01  -  07 Sep 2021 21:30  --------------------------------------------------------  IN:    IV PiggyBack: 250 mL    Lactated Ringers: 600 mL    Lactated Ringers Bolus: 1000 mL    Norepinephrine: 37.6 mL    sodium chloride 0.9%: 150 mL    Sodium Chloride 0.9% Bolus: 3000 mL  Total IN: 5037.6 mL    OUT:    Indwelling Catheter - Urethral (mL): 600 mL    Voided (mL): 200 mL  Total OUT: 800 mL  Total NET: 4237.6 mL      LABS:                        11.6   26.45 )-----------( 362      ( 07 Sep 2021 10:38 )             40.1     07    140  |  90<L>  |  100<H>  ----------------------------<  153<H>  4.0   |  27  |  2.58<H>    Ca    10.0      07 Sep 2021 10:56    TPro  9.4<H>  /  Alb  3.3  /  TBili  0.7  /  DBili  x   /  AST  205<H>  /  ALT  81<H>  /  AlkPhos  133<H>  0907      CARDIAC MARKERS ( 07 Sep 2021 20:59 )  .123 ng/mL / x     / x     / x     / x      CARDIAC MARKERS ( 07 Sep 2021 10:38 )  .127 ng/mL / x     / x     / x     / x          CAPILLARY BLOOD GLUCOSE  POCT Blood Glucose.: 179 mg/dL (07 Sep 2021 17:25)      PT/INR - ( 07 Sep 2021 10:38 )   PT: 12.4 sec;   INR: 1.03 ratio    PTT - ( 07 Sep 2021 10:38 )  PTT:41.4 sec      Urinalysis Basic - ( 07 Sep 2021 10:41 )  Color: Yellow / Appearance: Turbid / S.010 / pH: x  Gluc: x / Ketone: Negative  / Bili: Negative / Urobili: Negative mg/dL   Blood: x / Protein: 500 mg/dL / Nitrite: Negative   Leuk Esterase: Moderate / RBC: 3-5 /HPF / WBC 26-50   Sq Epi: x / Non Sq Epi: Few / Bacteria: TNTC      CULTURES:  < from: CT Chest No Cont (21 @ 11:45) >  EXAM:  CT CHEST                            RADIOLOGY:  PROCEDURE DATE:  2021    INTERPRETATION:  CLINICAL INFORMATION: Post cardiac arrest    COMPARISON: 6/15/2021    CONTRAST/COMPLICATIONS:  IV Contrast: NONE  0 cc administered   0 cc discarded  Oral Contrast: NONE  Complications: None reported at time of study completion    PROCEDURE:  CT of the Chest, Abdomen and Pelvis was performed.  Sagittal and coronal reformats were performed.    FINDINGS:  CHEST:  LUNGS AND LARGE AIRWAYS: Patent central airways. Tracheostomy cannula in position unchanged. There are retained secretions in the trachea. Bibasilar dependent consolidation with air bronchograms representing aspiration pneumonia and atelectasis.  PLEURA: No pleural effusion.  VESSELS: Nonaneurysmal  HEART: Heart size is normal, with cardiac vascular calcification. No pericardial effusion.  MEDIASTINUM AND JHONNY: Fluid and gaseous distention of the thoracic esophagus suggesting gastroesophageal reflux.  CHEST WALL AND LOWER NECK: Within normal limits.    ABDOMEN AND PELVIS:  LIVER: Within normal limits.  BILE DUCTS: Normal caliber.  GALLBLADDER: Distended without calcified stones.  SPLEEN: Within normal limits.  PANCREAS: Within normal limits.  ADRENALS: Bilateral thickened adrenals similar to prior  KIDNEYS/URETERS: Within normal limits.    BLADDER: Decompressed with Reid.  REPRODUCTIVE ORGANS: No masses    BOWEL: No bowel obstruction. Mild ileus transverse right colon. No mechanical obstruction. Marked fluid distention of the rectal vault. Gastrostomy tube in the stomach. Appendix no appendicitis  PERITONEUM: No ascites.  VESSELS: Nonaneurysmal.  RETROPERITONEUM/LYMPH NODES: No lymphadenopathy.  ABDOMINAL WALL: Within normal limits.  BONES: Stable.    IMPRESSION:  Bibasilar aspiration pneumonia and atelectasis.  Fluid and gaseous distention of the thoracic esophagus suggesting gastroesophageal reflux  Mild colonic ileus.  Marked fluid distention of the rectum  Additional findings as discussed  --- End of Report ---  NA LIMA MD; Attending Radiologist  This document has been electronically signed. Sep  7 2021 12:47PM  < end of copied text >    < from: CT Head No Cont (21 @ 11:23) >  PROCEDURE DATE:  2021      INTERPRETATION:  CLINICAL INDICATION: Dementia, Alzheimer's suspected.    TECHNIQUE: CT of the head was performed without the administration of intravenous contrast.    COMPARISON: CT head 2020.    FINDINGS:  Severe diffuse parenchymal atrophy is again noted.    No acute intracranial hemorrhage. Ex vacuo dilatation of the lateral ventricles.    White matter hypoattenuating foci are noted, compatible with age-indeterminate small vessel disease.    No extra-axial fluid collections.    The visualized intraorbital contents are unremarkable. Air-fluid level in the right sphenoid sinus. The mastoid air cells are clear. The visualized soft tissues and osseous structures appear normal.    IMPRESSION:    -Severe diffuse parenchymal atrophy again noted. No acute intracranial hemorrhage.    -Possible acute right sphenoid sinusitis.    --- End of Report ---  DONG SANTIAGO MD; Attending Radiologist  This document has been electronically signed. Sep  7 2021 11:27AM    < end of copied text >      SUPPLEMENTAL O2: AC/VC  LINES: Peripheral, CVC  IVF: N  REID: T   PPx: PPI, Lovenox  CONTACT: N

## 2021-09-07 NOTE — ED ADULT NURSE NOTE - NURSING MUSC EXTREMITY LIMITED ROM
pt does not move any extremities, contractures to all extremities/left upper extremity/right upper extremity/left lower extremity/right lower extremity

## 2021-09-07 NOTE — H&P ADULT - PROBLEM SELECTOR PLAN 1
Pt had cardiac arrest, now in post-arrest care  - Pt is Full Code  - hypotension, on levophed, will be admitted to ICU  - Cards consulted, will f/u on recs

## 2021-09-07 NOTE — PROVIDER CONTACT NOTE (EICU) - RECOMMENDATIONS
- agree with antibiotic choice, broad spectrum for now (meropenum or pipercillin/tazobactam). Alson fluxonazole (presume for previous UTI +/- oral thrush findings- unable to personally visualize oral pharynx). deferring vancomycin to bedside clinical team   - shock state on norepinephrine  - adding some fluids as well, told making urine, though cloudy, with RYAN. will give 1000mL LR for now, then 150mL/hr x 8 hours and re assess. if shock state improves then will de-resuscitate as needed.   - home medications as per bedside clinical team  - will require central line  -

## 2021-09-07 NOTE — ED PROCEDURE NOTE - PROCEDURE ADDITIONAL DETAILS
lower L leg intraosseous placement 15 mm IO needle lower R leg intraosseous placement 15 mm IO needle

## 2021-09-07 NOTE — H&P ADULT - HISTORY OF PRESENT ILLNESS
76 y/o f trach dependent, with PEG tube, DM, HTN, GERD, CVA, dementia, constipation p/w being found unresponsive at [facility], brought in by EMS for post cardiac arrest care.          74 y/o f trach dependent, with PEG tube, DM, HTN, GERD, CVA, dementia, constipation p/w being found unresponsive at [facility], brought in by EMS for post cardiac arrest care.       In ED, pt was given IVF NS bolus, was foudn to be hypotensive, and started on levophed for pressor support, and CT showed signs of aspiration PNA, an dpt was started on IV zosyn.

## 2021-09-08 LAB
A1C WITH ESTIMATED AVERAGE GLUCOSE RESULT: 6.9 % — HIGH (ref 4–5.6)
ALBUMIN SERPL ELPH-MCNC: 2.2 G/DL — LOW (ref 3.3–5)
ALP SERPL-CCNC: 79 U/L — SIGNIFICANT CHANGE UP (ref 30–120)
ALT FLD-CCNC: 63 U/L DA — HIGH (ref 10–60)
ANION GAP SERPL CALC-SCNC: 10 MMOL/L — SIGNIFICANT CHANGE UP (ref 5–17)
AST SERPL-CCNC: 118 U/L — HIGH (ref 10–40)
BASE EXCESS BLDV CALC-SCNC: -3.1 MMOL/L — LOW (ref -2–3)
BILIRUB SERPL-MCNC: 0.6 MG/DL — SIGNIFICANT CHANGE UP (ref 0.2–1.2)
BUN SERPL-MCNC: 59 MG/DL — HIGH (ref 7–23)
CALCIUM SERPL-MCNC: 7.9 MG/DL — LOW (ref 8.4–10.5)
CHLORIDE SERPL-SCNC: 102 MMOL/L — SIGNIFICANT CHANGE UP (ref 96–108)
CO2 SERPL-SCNC: 29 MMOL/L — SIGNIFICANT CHANGE UP (ref 22–31)
COVID-19 SPIKE DOMAIN AB INTERP: POSITIVE
COVID-19 SPIKE DOMAIN ANTIBODY RESULT: >250 U/ML — HIGH
CREAT SERPL-MCNC: 1.61 MG/DL — HIGH (ref 0.5–1.3)
ESTIMATED AVERAGE GLUCOSE: 151 MG/DL — HIGH (ref 68–114)
GAS PNL BLDV: SIGNIFICANT CHANGE UP
GLUCOSE BLDC GLUCOMTR-MCNC: 147 MG/DL — HIGH (ref 70–99)
GLUCOSE BLDC GLUCOMTR-MCNC: 214 MG/DL — HIGH (ref 70–99)
GLUCOSE BLDC GLUCOMTR-MCNC: 218 MG/DL — HIGH (ref 70–99)
GLUCOSE BLDC GLUCOMTR-MCNC: 219 MG/DL — HIGH (ref 70–99)
GLUCOSE BLDC GLUCOMTR-MCNC: 224 MG/DL — HIGH (ref 70–99)
GLUCOSE SERPL-MCNC: 194 MG/DL — HIGH (ref 70–99)
GRAM STN FLD: SIGNIFICANT CHANGE UP
HCO3 BLDV-SCNC: 21 MMOL/L — LOW (ref 22–29)
HCT VFR BLD CALC: 25.5 % — LOW (ref 34.5–45)
HCT VFR BLD CALC: 25.9 % — LOW (ref 34.5–45)
HGB BLD-MCNC: 7.6 G/DL — LOW (ref 11.5–15.5)
HGB BLD-MCNC: 7.7 G/DL — LOW (ref 11.5–15.5)
HOROWITZ INDEX BLDV+IHG-RTO: 30 — SIGNIFICANT CHANGE UP
INR BLD: 1.03 RATIO — SIGNIFICANT CHANGE UP (ref 0.88–1.16)
LACTATE SERPL-SCNC: 1 MMOL/L — SIGNIFICANT CHANGE UP (ref 0.7–2)
LEGIONELLA AG UR QL: NEGATIVE — SIGNIFICANT CHANGE UP
MCHC RBC-ENTMCNC: 26.1 PG — LOW (ref 27–34)
MCHC RBC-ENTMCNC: 26.1 PG — LOW (ref 27–34)
MCHC RBC-ENTMCNC: 29.7 GM/DL — LOW (ref 32–36)
MCHC RBC-ENTMCNC: 29.8 GM/DL — LOW (ref 32–36)
MCV RBC AUTO: 87.6 FL — SIGNIFICANT CHANGE UP (ref 80–100)
MCV RBC AUTO: 87.8 FL — SIGNIFICANT CHANGE UP (ref 80–100)
MRSA PCR RESULT.: SIGNIFICANT CHANGE UP
NRBC # BLD: 0 /100 WBCS — SIGNIFICANT CHANGE UP (ref 0–0)
NRBC # BLD: 0 /100 WBCS — SIGNIFICANT CHANGE UP (ref 0–0)
PCO2 BLDV: 33 MMHG — LOW (ref 39–42)
PH BLDV: 7.42 — SIGNIFICANT CHANGE UP (ref 7.32–7.43)
PHOSPHATE SERPL-MCNC: 3.8 MG/DL — SIGNIFICANT CHANGE UP (ref 2.5–4.5)
PLATELET # BLD AUTO: 284 K/UL — SIGNIFICANT CHANGE UP (ref 150–400)
PLATELET # BLD AUTO: 289 K/UL — SIGNIFICANT CHANGE UP (ref 150–400)
PO2 BLDV: 51 MMHG — HIGH (ref 25–45)
POTASSIUM SERPL-MCNC: 3.1 MMOL/L — LOW (ref 3.5–5.3)
POTASSIUM SERPL-SCNC: 3.1 MMOL/L — LOW (ref 3.5–5.3)
PROCALCITONIN SERPL-MCNC: 7.69 NG/ML — HIGH (ref 0.02–0.1)
PROT SERPL-MCNC: 6.2 G/DL — SIGNIFICANT CHANGE UP (ref 6–8.3)
PROTHROM AB SERPL-ACNC: 12.5 SEC — SIGNIFICANT CHANGE UP (ref 10.6–13.6)
RBC # BLD: 2.91 M/UL — LOW (ref 3.8–5.2)
RBC # BLD: 2.95 M/UL — LOW (ref 3.8–5.2)
RBC # FLD: 15.9 % — HIGH (ref 10.3–14.5)
RBC # FLD: 16.2 % — HIGH (ref 10.3–14.5)
S AUREUS DNA NOSE QL NAA+PROBE: SIGNIFICANT CHANGE UP
SAO2 % BLDV: 87.5 % — SIGNIFICANT CHANGE UP (ref 67–88)
SARS-COV-2 IGG+IGM SERPL QL IA: >250 U/ML — HIGH
SARS-COV-2 IGG+IGM SERPL QL IA: POSITIVE
SODIUM SERPL-SCNC: 141 MMOL/L — SIGNIFICANT CHANGE UP (ref 135–145)
SPECIMEN SOURCE: SIGNIFICANT CHANGE UP
TROPONIN I SERPL-MCNC: 0.09 NG/ML — HIGH (ref 0.02–0.06)
WBC # BLD: 7.65 K/UL — SIGNIFICANT CHANGE UP (ref 3.8–10.5)
WBC # BLD: 8.44 K/UL — SIGNIFICANT CHANGE UP (ref 3.8–10.5)
WBC # FLD AUTO: 7.65 K/UL — SIGNIFICANT CHANGE UP (ref 3.8–10.5)
WBC # FLD AUTO: 8.44 K/UL — SIGNIFICANT CHANGE UP (ref 3.8–10.5)

## 2021-09-08 PROCEDURE — 93306 TTE W/DOPPLER COMPLETE: CPT | Mod: 26

## 2021-09-08 PROCEDURE — 99232 SBSQ HOSP IP/OBS MODERATE 35: CPT

## 2021-09-08 RX ORDER — PANTOPRAZOLE SODIUM 20 MG/1
40 TABLET, DELAYED RELEASE ORAL
Refills: 0 | Status: DISCONTINUED | OUTPATIENT
Start: 2021-09-08 | End: 2021-09-13

## 2021-09-08 RX ORDER — CHLORHEXIDINE GLUCONATE 213 G/1000ML
1 SOLUTION TOPICAL DAILY
Refills: 0 | Status: DISCONTINUED | OUTPATIENT
Start: 2021-09-08 | End: 2021-09-15

## 2021-09-08 RX ORDER — POTASSIUM CHLORIDE 20 MEQ
10 PACKET (EA) ORAL ONCE
Refills: 0 | Status: COMPLETED | OUTPATIENT
Start: 2021-09-08 | End: 2021-09-08

## 2021-09-08 RX ADMIN — PIPERACILLIN AND TAZOBACTAM 25 GRAM(S): 4; .5 INJECTION, POWDER, LYOPHILIZED, FOR SOLUTION INTRAVENOUS at 14:49

## 2021-09-08 RX ADMIN — INSULIN GLARGINE 36 UNIT(S): 100 INJECTION, SOLUTION SUBCUTANEOUS at 23:38

## 2021-09-08 RX ADMIN — HEPARIN SODIUM 5000 UNIT(S): 5000 INJECTION INTRAVENOUS; SUBCUTANEOUS at 06:13

## 2021-09-08 RX ADMIN — Medication 3 MILLILITER(S): at 01:19

## 2021-09-08 RX ADMIN — Medication 3 MILLILITER(S): at 13:42

## 2021-09-08 RX ADMIN — Medication 81 MILLIGRAM(S): at 13:03

## 2021-09-08 RX ADMIN — Medication 100 MILLIEQUIVALENT(S): at 09:44

## 2021-09-08 RX ADMIN — FENTANYL CITRATE 1 PATCH: 50 INJECTION INTRAVENOUS at 08:03

## 2021-09-08 RX ADMIN — Medication 3 MILLILITER(S): at 20:13

## 2021-09-08 RX ADMIN — FLUCONAZOLE 100 MILLIGRAM(S): 150 TABLET ORAL at 14:48

## 2021-09-08 RX ADMIN — Medication 0.5 MILLIGRAM(S): at 22:35

## 2021-09-08 RX ADMIN — Medication 10 MILLIGRAM(S): at 22:34

## 2021-09-08 RX ADMIN — PIPERACILLIN AND TAZOBACTAM 25 GRAM(S): 4; .5 INJECTION, POWDER, LYOPHILIZED, FOR SOLUTION INTRAVENOUS at 03:04

## 2021-09-08 RX ADMIN — PANTOPRAZOLE SODIUM 40 MILLIGRAM(S): 20 TABLET, DELAYED RELEASE ORAL at 17:28

## 2021-09-08 RX ADMIN — SENNA PLUS 2 TABLET(S): 8.6 TABLET ORAL at 22:35

## 2021-09-08 RX ADMIN — HEPARIN SODIUM 5000 UNIT(S): 5000 INJECTION INTRAVENOUS; SUBCUTANEOUS at 22:35

## 2021-09-08 RX ADMIN — Medication 650 MILLIGRAM(S): at 17:28

## 2021-09-08 RX ADMIN — Medication 0.5 MILLIGRAM(S): at 13:00

## 2021-09-08 RX ADMIN — HEPARIN SODIUM 5000 UNIT(S): 5000 INJECTION INTRAVENOUS; SUBCUTANEOUS at 13:03

## 2021-09-08 RX ADMIN — FENTANYL CITRATE 1 PATCH: 50 INJECTION INTRAVENOUS at 19:45

## 2021-09-08 RX ADMIN — Medication 3 MILLILITER(S): at 06:30

## 2021-09-08 RX ADMIN — Medication 1 GRAM(S): at 23:38

## 2021-09-08 RX ADMIN — Medication 1 GRAM(S): at 17:28

## 2021-09-08 RX ADMIN — Medication 1 GRAM(S): at 06:13

## 2021-09-08 RX ADMIN — CHLORHEXIDINE GLUCONATE 1 APPLICATION(S): 213 SOLUTION TOPICAL at 13:03

## 2021-09-08 RX ADMIN — SODIUM CHLORIDE 75 MILLILITER(S): 9 INJECTION, SOLUTION INTRAVENOUS at 15:28

## 2021-09-08 RX ADMIN — Medication 2 TABLET(S): at 13:02

## 2021-09-08 RX ADMIN — Medication 1 GRAM(S): at 13:02

## 2021-09-08 NOTE — PROGRESS NOTE ADULT - ASSESSMENT
74 yo f pmhx dementia, CVA, VDRF s/p trach/peg, quadriplegia, DM, HTN, GERD, constipation admitted with     1. Cardiac Arrest  2. Hypoxic Respiratory Failure  3. Pneumonia  4. Septic Shock   5. UTI  6. RYAN    NEURO: Dementia supportive care, MS appears at baseline  CV: Septic shock requiring vasopressor therapy, actively titrating levophed for MAP >65, will wean as tolerated.  HR remains in low 60s, not a candidate for midodrine at this time.  RESP: Acute on chronic hypoxic respiratory failure, ac/vc 6cc/kg tv lung protective strategies, actively titrating fio2/peep for spo2 >92%, pneumonia- aggressive chest pt, lavage and suctioning.    RENAL: RYAN improving, avoid nephrotoxic meds, renally dose meds, trend urine output, bun/cr and electrolytes, replace lytes. Woody in place.   GI: NPO, Peg in place  ENDO: POCT q6hrs while NPO  ID: Zosyn/diflucan for coverage.  Blood/urine/sputum cx pending.  HEME: Lovenox for vte ppx  DISPO: Full code.  On going GOC with family.     Critical Care time: 45 mins assessing presenting problems of acute illness that poses high probability of life threatening deterioration or end organ damage/dysfunction.  Medical decision making including Initiating plan of care, reviewing data, reviewing radiology, discussing with multidisciplinary team, non inclusive of procedures, discussing goals of care with patient/family

## 2021-09-08 NOTE — PROGRESS NOTE ADULT - ASSESSMENT
74 y/o f trach dependent, with PEG tube, DM, HTN, GERD, CVA, dementia, constipation p/w being found unresponsive at [facility], brought in by EMS for post cardiac arrest care, found to have sepsis, b/l aspiration PNA, and mild colonic ileus

## 2021-09-08 NOTE — PROGRESS NOTE ADULT - ASSESSMENT
74 y/o f trach dependent, with PEG tube, DM, HTN, GERD, CVA, dementia, constipation p/w being found unresponsive at [facility], brought in by EMS for post cardiac arrest care.  Noted to be febrile with leukocytosis and bandemia.    RECOMMENDATIONS    Sepsis - pt has vaughn, chronic vent support with trach, and nonverbal so not helping with localization.     rec continue ZOSYN (started 9/7) with recs to follow but high risk for PNA, Pyelo, or abd source. Currently sputum is scant, rapid improvment and drop in wbc    further recs to follow     We will follow along in the care of this patient. Please call us at [phone] or text me directly on my cell# at [phone] with any concerns.

## 2021-09-08 NOTE — PROGRESS NOTE ADULT - SUBJECTIVE AND OBJECTIVE BOX
SUBJECTIVE:    No acute events overnight, afebrile, hds.    VITAL SIGNS:    Vital Signs Last 24 Hrs  T(C): 37 (08 Sep 2021 12:15), Max: 37.2 (08 Sep 2021 03:00)  T(F): 98.6 (08 Sep 2021 12:15), Max: 99 (08 Sep 2021 06:45)  HR: 65 (08 Sep 2021 11:00) (47 - 86)  BP: 114/58 (08 Sep 2021 11:00) (78/34 - 148/48)  BP(mean): 76 (08 Sep 2021 11:00) (47 - 83)  RR: 20 (08 Sep 2021 11:00) (0 - 37)  SpO2: 96% (08 Sep 2021 11:00) (93% - 100%)    PHYSICAL EXAM:     GENERAL: not alert, but responds to painful stimuli  HEENT: trached, vented  RESPIRATORY: decreased and coarse bs b/l  CARDIOVASCULAR:  s1 and s2, rrr  ABDOMINAL: soft, nd, + PEG  EXTREMITIES: no c/c/e  NEUROLOGICAL: not alert   MUSCULOSKELETAL: no gross joint deformity                            7.7    7.65  )-----------( 289      ( 08 Sep 2021 09:03 )             25.9     09-08    141  |  102  |  59<H>  ----------------------------<  194<H>  3.1<L>   |  29  |  1.61<H>    Ca    7.9<L>      08 Sep 2021 06:55  Phos  3.8     09-08    TPro  6.2  /  Alb  2.2<L>  /  TBili  0.6  /  DBili  x   /  AST  118<H>  /  ALT  63<H>  /  AlkPhos  79  09-08      CAPILLARY BLOOD GLUCOSE      POCT Blood Glucose.: 218 mg/dL (08 Sep 2021 12:47)  POCT Blood Glucose.: 214 mg/dL (08 Sep 2021 06:10)  POCT Blood Glucose.: 147 mg/dL (08 Sep 2021 00:23)  POCT Blood Glucose.: 148 mg/dL (07 Sep 2021 21:52)  POCT Blood Glucose.: 179 mg/dL (07 Sep 2021 17:25)      MEDICATIONS  (STANDING):  albuterol/ipratropium for Nebulization 3 milliLiter(s) Nebulizer every 6 hours  aspirin  chewable 81 milliGRAM(s) Enteral Tube daily  bisacodyl Suppository 10 milliGRAM(s) Rectal at bedtime  chlorhexidine 2% Cloths 1 Application(s) Topical daily  dextrose 40% Gel 15 Gram(s) Oral once  dextrose 5%. 1000 milliLiter(s) (50 mL/Hr) IV Continuous <Continuous>  dextrose 5%. 1000 milliLiter(s) (100 mL/Hr) IV Continuous <Continuous>  dextrose 50% Injectable 25 Gram(s) IV Push once  dextrose 50% Injectable 12.5 Gram(s) IV Push once  dextrose 50% Injectable 25 Gram(s) IV Push once  fentaNYL   Patch  12 MICROgram(s)/Hr 1 Patch Transdermal every 72 hours  fluconAZOLE   Tablet 100 milliGRAM(s) Oral daily  glucagon  Injectable 1 milliGRAM(s) IntraMuscular once  heparin   Injectable 5000 Unit(s) SubCutaneous every 8 hours  insulin glargine Injectable (LANTUS) 36 Unit(s) SubCutaneous at bedtime  insulin lispro (ADMELOG) corrective regimen sliding scale   SubCutaneous every 6 hours  lactated ringers. 1000 milliLiter(s) (150 mL/Hr) IV Continuous <Continuous>  lactated ringers. 1000 milliLiter(s) (75 mL/Hr) IV Continuous <Continuous>  lactobacillus acidophilus 2 Tablet(s) Oral daily  LORazepam   Injectable 0.5 milliGRAM(s) IV Push three times a day  norepinephrine Infusion 0.05 MICROgram(s)/kG/Min (2.77 mL/Hr) IV Continuous <Continuous>  pantoprazole  Injectable 40 milliGRAM(s) IV Push two times a day  piperacillin/tazobactam IVPB.. 3.375 Gram(s) IV Intermittent every 12 hours  polyethylene glycol 3350 17 Gram(s) Oral daily  senna 2 Tablet(s) Oral at bedtime  sucralfate suspension 1 Gram(s) Oral four times a day

## 2021-09-08 NOTE — PROGRESS NOTE ADULT - ASSESSMENT
CHAPINCITO GUIDRY 75 f [facility] P 9/7/2021   DR ILIANA KEMP     REVIEW OF SYMPTOMS      Able to give (reliable) ROS  NO     PHYSICAL EXAM    HEENT Unremarkable  atraumatic   RESP Fair air entry EXP prolonged    Harsh breath sound Resp distres mild   CARDIAC S1 S2 No S3     NO JVD    ABDOMEN SOFT BS PRESENT NOT DISTENDED No hepatosplenomegaly   PEDAL EDEMA present No calf tenderness  NO rash      9/7/2021 PATIENT PRESENTATION.  74F PMH Frontotemporal dementia, CVA, chronic vent dependent respiratory failure, dysphagia s/p PEG, recurrent UTI/VAP Past HO sputum Pseudomonas resistant zosyn presents sp cac at [facility] nursing facility on 9/7/2021   In syo er on 9/7/2021 already given 4l ns   Pulm critical care consulted 9/7/2021     RECENT HOSPITAL STAYS   6/15-6/23 [facility] p    6/19/2021 fungitell 44  6/19 caspofungin  10/30-11/6 [facility] P      Home meds poa included meropenem bd duragesic 12 lorazepam 1.3 asa 81 lovnx 40 lantus 40 hs      9/7/2021 PRESENTING PROBLEMS.        SP CAC AT [facility] FEW MIN 9/7/2021   POSSIBLE ASP PNEUMONIA poa  SHOCK poa   TRACH POA   VENT DEPENENT POA   PEG POA  DM   [facility] APHASIC STROKE         BEST PRACTICE ISSUES.                                                  HEAD OF BED ELEVATION. Yes  DVT PROPHYLAXIS.  lvnx 30 (9/7/2021) -> hpsc (9/7)                                        SQUIRES PROPHYLAXIS.   protonix 40 (9/7/2021)       carafate 1.4 (9/7/2021)                                                                                   DIET.     npo 9/7/2021  -> glucerna 1.5 1200 (9/7)                     INFECTION PROPHYLAXIS.           chlorhexidine 2% (9/8)              GENERAL ISSUES  GOC ADVANCED DIRECTIVE.                                         ALLERGY.                            WEIGHT.         9/7/2021 130                           BMI.                   9/7/2021  22      PATIENT DATA   TUBES  PROCEDURES.      9/7/2021 Attempted by er md andrews and r subclav unsuccessful   9/7/2021 IO   9/7/2021 vaughn  9/8/2021 C line lij  ccpa     PEG poa  TRACH poa     ABG.       OXYGENATION.                   VITALS/IO/VENT/DRIPS.     9/8/2021 99f 60 115/50   9/8/2021 6a nore .12 m/k/m   9/8/2021 u 1000   9/8/2021 7a ac 14/400/5/.3      PROBLEM ASSESSMENT/RECOMMENDATIONS.    COVID PROFILE.  scv2 9/7/2021 (-)  No current covid infection    SP CAC AT [facility] FEW MIN 9/7/2021.   Opens eyes     POSSIBLE ASP PNEUMONIA poa.  w 9/7-9/8/2021 w 26 -8.4  b 9/7/2021 b 10   ua 9/7/2021 w 26-50 bact tntc   ct cap 9/7/2021   basl asp pneum and atelect  fluid gas distn of thoracic esophagus suggesting gerd  mild colon ileus   marked fluid distention rectunm  rvp 9/7/2021 (-)  mrsa 9/8/2021 (-)   Diflucan 100 x 5d (9/7/2021)   zosyn x 7d (9/7/2021) (Dr Mcpherson) (Pneu)        SHOCK poa.   lr 150 x 8 h then lr  (9/7/2021)   norepi 9/7/2021 12p  C line placed 9/8/2021  target map 65 (+)    LACTICEMIA  poa.  la 9/7-9/7-9/8/2021 la 13.5-3.7- 1     TRACH POA.     VENT DEPENENT POA.   Target pH 730 (+) PO2 60 (+) po 90-95% Pplat 35 (-)   HOBE DSV DSBT  9/8/2021 No abg available resp could not get abg     COPD.  duoneb (9/7/2021)       RO MI.   Tr 9/7-9/8/2021 tr .17- .091   ASA 81 (9/7/2021)   metoprolol 25.2 (9/7/2021)     RO CHF.   bnp 9/7/2021 bnp 2391   echo 6/15/2021 n lvsf dd1     ANEMIA   Hb 9/7-9/8 11.6-7.6  9/8/2021 7:59 AM Message sent GI Dr Nieves  Drop likely sec to hydration Need to exclude abla   Check cbc stat if dropping will make npo and get ctap ro rpb     PEG POA.    GERD.  9/7/2021 ct cap gerd   protonix 40 99/7/2021)                                             ELEVATED LFTS POA.  LFTS 9/7/2021  ap 122  ast 205  alt 81                                        COLON ILEUS ON CTAP 9/7/2021.   bisacodyl 10 (9/7/2021)   miralax (9/7/2021)     RYAN poa.  Cr 9/7-9/8/2021 Cr 2.5- 1.6  cr 6/14-6/15-6/16-6/19 Cr 1.7-1.3-1.1 - 1.1   lr 150 x 8 h then ns 75 (9/7/2021)     DM.   insulin     PMH APHASIC STROKE.   AC R SPHENOID SINUSITIS 9/7/2021 CT.   ct head 9/7/2021 No ac poss ac r sphenoid sinusitis    PAIN.   fentnyl 12 (9/7/2021)    ANXIETY.   lorazepam 1.3 (9/7/2021)           OVERALL PLAN.  SP CAC C monitor C enz   VENT Vent bndle  ASP PNEUM bsab  RYAN Hydration monitor   SHOCK  target pap 65 (+)  DROP IN HB 9/8/2021 Likely sec fluids gi made aware will recheck hb         TIME SPENT   Over 36 minutes aggregate critical care time spent on encounter; activities included   direct patient care, counseling and/or coordinating care reviewing notes, lab data/ imaging , discussion with multidisciplinary team/ patient  /family and explaining in detail risks, benefits, alternatives  of the recommendations     CHAPINCITO GUIDRY 75 f [facility] P 9/7/2021   DR ILIANA KEMP

## 2021-09-08 NOTE — PROGRESS NOTE ADULT - SUBJECTIVE AND OBJECTIVE BOX
Date/Time Patient Seen:  		  Referring MD:   Data Reviewed	       Patient is a 75y old  Female who presents with a chief complaint of Unresponsive, post-cardiac arrest (08 Sep 2021 08:13)      Subjective/HPI     PAST MEDICAL & SURGICAL HISTORY:  Dementia of frontal lobe type    Aphasic stroke    Diabetes mellitus    Respiratory failure    Hypertension    GERD (gastroesophageal reflux disease)    Constipation    Respiratory failure    CVA (cerebral vascular accident)    HTN (hypertension)    DM (diabetes mellitus)    Advanced dementia    COVID-19 virus detected    Quadriplegia    Pneumonia    Type II diabetes mellitus    Hx of appendectomy    Gastrostomy in place    Tracheostomy in place    Tracheostomy tube present    Feeding by G-tube          Medication list         MEDICATIONS  (STANDING):  albuterol/ipratropium for Nebulization 3 milliLiter(s) Nebulizer every 6 hours  aspirin  chewable 81 milliGRAM(s) Enteral Tube daily  bisacodyl Suppository 10 milliGRAM(s) Rectal at bedtime  chlorhexidine 2% Cloths 1 Application(s) Topical daily  dextrose 40% Gel 15 Gram(s) Oral once  dextrose 5%. 1000 milliLiter(s) (50 mL/Hr) IV Continuous <Continuous>  dextrose 5%. 1000 milliLiter(s) (100 mL/Hr) IV Continuous <Continuous>  dextrose 50% Injectable 25 Gram(s) IV Push once  dextrose 50% Injectable 12.5 Gram(s) IV Push once  dextrose 50% Injectable 25 Gram(s) IV Push once  fentaNYL   Patch  12 MICROgram(s)/Hr 1 Patch Transdermal every 72 hours  fluconAZOLE   Tablet 100 milliGRAM(s) Oral daily  glucagon  Injectable 1 milliGRAM(s) IntraMuscular once  heparin   Injectable 5000 Unit(s) SubCutaneous every 8 hours  insulin glargine Injectable (LANTUS) 36 Unit(s) SubCutaneous at bedtime  insulin lispro (ADMELOG) corrective regimen sliding scale   SubCutaneous every 6 hours  lactated ringers. 1000 milliLiter(s) (150 mL/Hr) IV Continuous <Continuous>  lactated ringers. 1000 milliLiter(s) (75 mL/Hr) IV Continuous <Continuous>  lactobacillus acidophilus 2 Tablet(s) Oral daily  LORazepam   Injectable 0.5 milliGRAM(s) IV Push three times a day  norepinephrine Infusion 0.05 MICROgram(s)/kG/Min (2.77 mL/Hr) IV Continuous <Continuous>  pantoprazole  Injectable 40 milliGRAM(s) IV Push daily  piperacillin/tazobactam IVPB.. 3.375 Gram(s) IV Intermittent every 12 hours  polyethylene glycol 3350 17 Gram(s) Oral daily  potassium chloride  10 mEq/100 mL IVPB 10 milliEquivalent(s) IV Intermittent once  senna 2 Tablet(s) Oral at bedtime  sucralfate suspension 1 Gram(s) Oral four times a day    MEDICATIONS  (PRN):  acetaminophen   Tablet .. 650 milliGRAM(s) Oral every 6 hours PRN Temp greater or equal to 38C (100.4F), Mild Pain (1 - 3)  sodium chloride 0.9% lock flush 10 milliLiter(s) IV Push every 1 hour PRN Pre/post blood products, medications, blood draw, and to maintain line patency         Vitals log        ICU Vital Signs Last 24 Hrs  T(C): 37.2 (08 Sep 2021 06:45), Max: 38.4 (07 Sep 2021 09:55)  T(F): 99 (08 Sep 2021 06:45), Max: 101.1 (07 Sep 2021 09:55)  HR: 64 (08 Sep 2021 07:28) (47 - 130)  BP: 115/50 (08 Sep 2021 06:45) (55/33 - 184/66)  BP(mean): 69 (08 Sep 2021 06:45) (36 - 83)  ABP: --  ABP(mean): --  RR: 15 (08 Sep 2021 06:45) (0 - 40)  SpO2: 99% (08 Sep 2021 07:28) (93% - 100%)       Mode: AC/ CMV (Assist Control/ Continuous Mandatory Ventilation)  RR (machine): 14  TV (machine): 400  FiO2: 30  PEEP: 5  ITime: 1  MAP: 9  PIP: 28      Input and Output:  I&O's Detail    07 Sep 2021 07:01  -  08 Sep 2021 07:00  --------------------------------------------------------  IN:    Enteral Tube Flush: 200 mL    Glucerna 1.5: 210 mL    IV PiggyBack: 350 mL    Lactated Ringers: 600 mL    Lactated Ringers: 1200 mL    Lactated Ringers Bolus: 1000 mL    Norepinephrine: 108.7 mL    sodium chloride 0.9%: 150 mL    Sodium Chloride 0.9% Bolus: 3000 mL  Total IN: 6818.7 mL    OUT:    Indwelling Catheter - Urethral (mL): 1600 mL    Voided (mL): 200 mL  Total OUT: 1800 mL    Total NET: 5018.7 mL          Lab Data                        7.6    8.44  )-----------( 284      ( 08 Sep 2021 06:55 )             25.5     09-08    141  |  102  |  59<H>  ----------------------------<  194<H>  3.1<L>   |  29  |  1.61<H>    Ca    7.9<L>      08 Sep 2021 06:55  Phos  3.8     09-08    TPro  6.2  /  Alb  2.2<L>  /  TBili  0.6  /  DBili  x   /  AST  118<H>  /  ALT  63<H>  /  AlkPhos  79  09-08      CARDIAC MARKERS ( 08 Sep 2021 06:55 )  .091 ng/mL / x     / x     / x     / x      CARDIAC MARKERS ( 07 Sep 2021 20:59 )  .123 ng/mL / x     / x     / x     / x      CARDIAC MARKERS ( 07 Sep 2021 10:38 )  .127 ng/mL / x     / x     / x     / x            Review of Systems	      Objective     Physical Examination    heart s1s2  lung dec BS  abd soft  head nc      Pertinent Lab findings & Imaging      Woody:  NO   Adequate UO     I&O's Detail    07 Sep 2021 07:01  -  08 Sep 2021 07:00  --------------------------------------------------------  IN:    Enteral Tube Flush: 200 mL    Glucerna 1.5: 210 mL    IV PiggyBack: 350 mL    Lactated Ringers: 600 mL    Lactated Ringers: 1200 mL    Lactated Ringers Bolus: 1000 mL    Norepinephrine: 108.7 mL    sodium chloride 0.9%: 150 mL    Sodium Chloride 0.9% Bolus: 3000 mL  Total IN: 6818.7 mL    OUT:    Indwelling Catheter - Urethral (mL): 1600 mL    Voided (mL): 200 mL  Total OUT: 1800 mL    Total NET: 5018.7 mL               Discussed with:     Cultures:	        Radiology

## 2021-09-08 NOTE — PROGRESS NOTE ADULT - ASSESSMENT
74 yo f pmhx dementia, CVA, VDRF s/p trach/peg, quadriplegia, DM, HTN, GERD, constipation admitted with   s/p card arrest - sepsis - shock - acute infection - dementia - chr resp failure    GOC discussion  pt is full code   is the HCP  on TF  on NEBS  on ABX rx regimen  assist with needs - ADL  HOB elev - orgal hygiene - skin care  prognosis guarded to poor

## 2021-09-08 NOTE — PROGRESS NOTE ADULT - SUBJECTIVE AND OBJECTIVE BOX
[facility] DIVISION of INFECTIOUS DISEASE  Arturo Olivas MD PhD, Haylee Umanzor MD, Andressa Brantley MD, Billy Valenzuela MD  and providing coverage with Alexa Canas MD and Eusebio Chairez MD  Providing Infectious Disease Consultations at [facility], [facility]'s    Office# [phone] to schedule follow up appointments  Answering Service for urgent calls or New Consults [phone]  Cell# to text for urgent issues Arturo Olivas [phone]     infectious diseases progress note:    BREA BECKHAM is a 75y y. o. Female patient    Patient intubated in [facility]    Allergies    codeine (Hives)    Intolerances        ANTIBIOTICS/RELEVANT:  antimicrobials  fluconAZOLE   Tablet 100 milliGRAM(s) Oral daily  piperacillin/tazobactam IVPB.. 3.375 Gram(s) IV Intermittent every 12 hours    immunologic:    OTHER:  acetaminophen   Tablet .. 650 milliGRAM(s) Oral every 6 hours PRN  albuterol/ipratropium for Nebulization 3 milliLiter(s) Nebulizer every 6 hours  aspirin  chewable 81 milliGRAM(s) Enteral Tube daily  bisacodyl Suppository 10 milliGRAM(s) Rectal at bedtime  chlorhexidine 2% Cloths 1 Application(s) Topical daily  dextrose 40% Gel 15 Gram(s) Oral once  dextrose 5%. 1000 milliLiter(s) IV Continuous <Continuous>  dextrose 5%. 1000 milliLiter(s) IV Continuous <Continuous>  dextrose 50% Injectable 25 Gram(s) IV Push once  dextrose 50% Injectable 12.5 Gram(s) IV Push once  dextrose 50% Injectable 25 Gram(s) IV Push once  fentaNYL   Patch  12 MICROgram(s)/Hr 1 Patch Transdermal every 72 hours  glucagon  Injectable 1 milliGRAM(s) IntraMuscular once  heparin   Injectable 5000 Unit(s) SubCutaneous every 8 hours  insulin glargine Injectable (LANTUS) 36 Unit(s) SubCutaneous at bedtime  insulin lispro (ADMELOG) corrective regimen sliding scale   SubCutaneous every 6 hours  lactated ringers. 1000 milliLiter(s) IV Continuous <Continuous>  lactated ringers. 1000 milliLiter(s) IV Continuous <Continuous>  lactobacillus acidophilus 2 Tablet(s) Oral daily  LORazepam   Injectable 0.5 milliGRAM(s) IV Push three times a day  norepinephrine Infusion 0.05 MICROgram(s)/kG/Min IV Continuous <Continuous>  pantoprazole  Injectable 40 milliGRAM(s) IV Push daily  polyethylene glycol 3350 17 Gram(s) Oral daily  senna 2 Tablet(s) Oral at bedtime  sodium chloride 0.9% lock flush 10 milliLiter(s) IV Push every 1 hour PRN  sucralfate suspension 1 Gram(s) Oral four times a day      Objective:  Last 24-Vital Signs Last 24 Hrs  T(C): 36.8 (08 Sep 2021 08:30), Max: 37.2 (07 Sep 2021 11:31)  T(F): 98.3 (08 Sep 2021 08:30), Max: 99 (07 Sep 2021 11:31)  HR: 64 (08 Sep 2021 10:00) (47 - 101)  BP: 118/49 (08 Sep 2021 10:00) (55/33 - 184/66)  BP(mean): 70 (08 Sep 2021 10:00) (36 - 83)  RR: 14 (08 Sep 2021 10:00) (0 - 40)  SpO2: 96% (08 Sep 2021 10:00) (93% - 100%)    T(C): 36.8 (21 @ 08:30), Max: 38.4 (21 @ 09:55)  T(F): 98.3 (21 @ 08:30), Max: 101.1 (21 @ 09:55)  T(C): 36.8 (21 @ 08:30), Max: 38.4 (21 @ 09:55)  T(F): 98.3 (21 @ 08:30), Max: 101.1 (21 @ 09:55)  T(C): 36.8 (21 @ 08:30), Max: 38.4 (21 @ 09:55)  T(F): 98.3 (21 @ 08:30), Max: 101.1 (21 @ 09:55)    PHYSICAL EXAM:  Constitutional: Well-developed, well nourished  Eyes: PERRLA, EOMI  Ear/Nose/Throat: oropharynx normal	  Neck: no JVD, no lymphadenopathy, supple, intubated via trach  Respiratory: no accessory muscle use, lung fields bilaterally clear  Cardiovascular: distant  Gastrointestinal: soft, NT, no HSM, BS-normal  Extremities: no clubbing, no cyanosis, edema absent  Neuro: patient limited    Mode: AC/ CMV: 14, TV (machine): 400, FiO2: 30, PEEP: 5, ITime: 1, MAP: 9, PIP: 28    LABS:                        7.7    7.65  )-----------( 289      ( 08 Sep 2021 09:03 )             25.9       WBC 7.65   @ 09:03  WBC 8.44   @ 06:55  WBC 26.45   @ 10:38          141  |  102  |  59<H>  ----------------------------<  194<H>  3.1<L>   |  29  |  1.61<H>    Ca    7.9<L>      08 Sep 2021 06:55  Phos  3.8         TPro  6.2  /  Alb  2.2<L>  /  TBili  0.6  /  DBili  x   /  AST  118<H>  /  ALT  63<H>  /  AlkPhos  79        Creatinine, Serum: 1.61 mg/dL (21 @ 06:55)  Creatinine, Serum: 2.58 mg/dL (21 @ 10:56)      PT/INR - ( 08 Sep 2021 06:55 )   PT: 12.5 sec;   INR: 1.03 ratio         PTT - ( 07 Sep 2021 10:38 )  PTT:41.4 sec  Urinalysis Basic - ( 07 Sep 2021 10:41 )    Color: Yellow / Appearance: Turbid / S.010 / pH: x  Gluc: x / Ketone: Negative  / Bili: Negative / Urobili: Negative mg/dL   Blood: x / Protein: 500 mg/dL / Nitrite: Negative   Leuk Esterase: Moderate / RBC: 3-5 /HPF / WBC 26-50   Sq Epi: x / Non Sq Epi: Few / Bacteria: TNTC            MICROBIOLOGY:              RADIOLOGY & ADDITIONAL STUDIES:

## 2021-09-08 NOTE — PROGRESS NOTE ADULT - SUBJECTIVE AND OBJECTIVE BOX
Patient is a 75y old  Female who presents with a chief complaint of Unresponsive, post-cardiac arrest (08 Sep 2021 15:37)      BRIEF HOSPITAL COURSE: ***    Events last 24 hours: ***    PAST MEDICAL & SURGICAL HISTORY:  Dementia of frontal lobe type  Aphasic stroke  Diabetes mellitus  Respiratory failure  Hypertension  GERD (gastroesophageal reflux disease)  Constipation  Respiratory failure  CVA (cerebral vascular accident)  HTN (hypertension)  DM (diabetes mellitus)  Advanced dementia  COVID-19 virus detected  Quadriplegia  Pneumonia  Type II diabetes mellitus  Hx of appendectomy  Gastrostomy in place  Tracheostomy in place  Tracheostomy tube present  Feeding by G-tube      Allergies  codeine (Hives)      FAMILY HISTORY:  No pertinent family history in first degree relatives    Social History:   From [facility].       Review of Systems:  Unable to obtain 2/2 mental status.     Physical Examination:    General: No acute distress.      HEENT: Pupils equal, reactive to light.  Symmetric.    PULM: Clear to auscultation bilaterally, no significant sputum production    CVS: Regular rate and rhythm, no murmurs, rubs, or gallops    ABD: Soft, nondistended, nontender, normoactive bowel sounds, no masses    EXT: No edema, nontender    SKIN: Warm and well perfused, no rashes noted.    NEURO: Alert, oriented, interactive, nonfocal      Medications:  fluconAZOLE   Tablet 100 milliGRAM(s) Oral daily  piperacillin/tazobactam IVPB.. 3.375 Gram(s) IV Intermittent every 12 hours  norepinephrine Infusion 0.05 MICROgram(s)/kG/Min IV Continuous <Continuous>  albuterol/ipratropium for Nebulization 3 milliLiter(s) Nebulizer every 6 hours  acetaminophen   Tablet .. 650 milliGRAM(s) Oral every 6 hours PRN  fentaNYL   Patch  12 MICROgram(s)/Hr 1 Patch Transdermal every 72 hours  LORazepam   Injectable 0.5 milliGRAM(s) IV Push three times a day  aspirin  chewable 81 milliGRAM(s) Enteral Tube daily  heparin   Injectable 5000 Unit(s) SubCutaneous every 8 hours  bisacodyl Suppository 10 milliGRAM(s) Rectal at bedtime  pantoprazole  Injectable 40 milliGRAM(s) IV Push two times a day  polyethylene glycol 3350 17 Gram(s) Oral daily  senna 2 Tablet(s) Oral at bedtime  sucralfate suspension 1 Gram(s) Oral four times a day  dextrose 40% Gel 15 Gram(s) Oral once  dextrose 50% Injectable 25 Gram(s) IV Push once  dextrose 50% Injectable 12.5 Gram(s) IV Push once  dextrose 50% Injectable 25 Gram(s) IV Push once  glucagon  Injectable 1 milliGRAM(s) IntraMuscular once  insulin glargine Injectable (LANTUS) 36 Unit(s) SubCutaneous at bedtime  insulin lispro (ADMELOG) corrective regimen sliding scale   SubCutaneous every 6 hours  dextrose 5%. 1000 milliLiter(s) IV Continuous <Continuous>  dextrose 5%. 1000 milliLiter(s) IV Continuous <Continuous>  lactated ringers. 1000 milliLiter(s) IV Continuous <Continuous>  lactated ringers. 1000 milliLiter(s) IV Continuous <Continuous>  sodium chloride 0.9% lock flush 10 milliLiter(s) IV Push every 1 hour PRN  chlorhexidine 2% Cloths 1 Application(s) Topical daily  lactobacillus acidophilus 2 Tablet(s) Oral daily      Mode: AC/ CMV (Assist Control/ Continuous Mandatory Ventilation)  RR (machine): 14  TV (machine): 400  FiO2: 40  PEEP: 5  ITime: 1  MAP: 10  PIP: 29      ICU Vital Signs Last 24 Hrs  T(C): 37.2 (08 Sep 2021 16:14), Max: 37.2 (08 Sep 2021 03:00)  T(F): 99 (08 Sep 2021 16:14), Max: 99 (08 Sep 2021 06:45)  HR: 64 (08 Sep 2021 20:22) (56 - 107)  BP: 106/53 (08 Sep 2021 20:00) (83/45 - 180/100)  BP(mean): 70 (08 Sep 2021 20:00) (55 - 122)  ABP: --  ABP(mean): --  RR: 11 (08 Sep 2021 20:00) (6 - 46)  SpO2: 97% (08 Sep 2021 20:22) (95% - 100%)    Vital Signs Last 24 Hrs  T(C): 37.2 (08 Sep 2021 16:14), Max: 37.2 (08 Sep 2021 03:00)  T(F): 99 (08 Sep 2021 16:14), Max: 99 (08 Sep 2021 06:45)  HR: 64 (08 Sep 2021 20:22) (56 - 107)  BP: 106/53 (08 Sep 2021 20:00) (83/45 - 180/100)  BP(mean): 70 (08 Sep 2021 20:00) (55 - 122)  RR: 11 (08 Sep 2021 20:00) (6 - 46)  SpO2: 97% (08 Sep 2021 20:22) (95% - 100%)      I&O's Detail    07 Sep 2021 07:01  -  08 Sep 2021 07:00  --------------------------------------------------------  IN:    Enteral Tube Flush: 200 mL    Glucerna 1.5: 210 mL    IV PiggyBack: 350 mL    Lactated Ringers: 600 mL    Lactated Ringers: 1200 mL    Lactated Ringers Bolus: 1000 mL    Norepinephrine: 108.7 mL    sodium chloride 0.9%: 150 mL    Sodium Chloride 0.9% Bolus: 3000 mL  Total IN: 6818.7 mL    OUT:    Indwelling Catheter - Urethral (mL): 1600 mL    Voided (mL): 200 mL  Total OUT: 1800 mL  Total NET: 5018.7 mL      08 Sep 2021 07:01  -  08 Sep 2021 20:53  --------------------------------------------------------  IN:    Enteral Tube Flush: 75 mL    Glucerna 1.5: 140 mL    IV PiggyBack: 100 mL    Lactated Ringers: 900 mL    Norepinephrine: 63.3 mL  Total IN: 1278.3 mL    OUT:  Total OUT: 0 mL  Total NET: 1278.3 mL    LABS:                        7.7    7.65  )-----------( 289      ( 08 Sep 2021 09:03 )             25.9     09-08    141  |  102  |  59<H>  ----------------------------<  194<H>  3.1<L>   |  29  |  1.61<H>    Ca    7.9<L>      08 Sep 2021 06:55  Phos  3.8     09-08    TPro  6.2  /  Alb  2.2<L>  /  TBili  0.6  /  DBili  x   /  AST  118<H>  /  ALT  63<H>  /  AlkPhos  79      CARDIAC MARKERS ( 08 Sep 2021 06:55 )  .091 ng/mL / x     / x     / x     / x      CARDIAC MARKERS ( 07 Sep 2021 20:59 )  .123 ng/mL / x     / x     / x     / x      CARDIAC MARKERS ( 07 Sep 2021 10:38 )  .127 ng/mL / x     / x     / x     / x          CAPILLARY BLOOD GLUCOSE  POCT Blood Glucose.: 219 mg/dL (08 Sep 2021 17:14)      PT/INR - ( 08 Sep 2021 06:55 )   PT: 12.5 sec;   INR: 1.03 ratio    PTT - ( 07 Sep 2021 10:38 )  PTT:41.4 sec      Urinalysis Basic - ( 07 Sep 2021 10:41 )  Color: Yellow / Appearance: Turbid / S.010 / pH: x  Gluc: x / Ketone: Negative  / Bili: Negative / Urobili: Negative mg/dL   Blood: x / Protein: 500 mg/dL / Nitrite: Negative   Leuk Esterase: Moderate / RBC: 3-5 /HPF / WBC 26-50   Sq Epi: x / Non Sq Epi: Few / Bacteria: TNTC      CULTURES:  Culture Results:   No growth to date. ( @ 15:14)  Culture Results:   No growth to date. ( @ 15:14)  Culture Results:   >100,000 CFU/ml Proteus mirabilis ( @ 15:14)      RADIOLOGY:   < from: Xray Chest 1 View-PORTABLE IMMEDIATE (Xray Chest 1 View-PORTABLE IMMEDIATE .) (21 @ 20:59) >  EXAM:  XR CHEST AP OR PA 1V                          EXAM:  XR CHEST PORTABLE IMMED 1V                          PROCEDURE DATE:  2021      INTERPRETATION:  AP chest on 2021 at 1:38 PM. Patient is unresponsive.    Heart magnified by technique. Tracheostomy remains.    There is developing infiltrate off the left hilum compared to earlier in the day.    Follow-up AP chest on 2021 at 8:38 PM.    A left jugular line is been inserted in good position. Left lower perihilar infiltrate shows improvement.    IMPRESSION: Left lower perihilar infiltrate. Left jugular line inserted.    --- End of Report --    HANSEL ANDRADE MD; Attending Radiologist  This document has been electronically signed. 2021  9:26AM    < end of copied text >      SUPPLEMENTAL O2: AC/VC  LINES: Peripheral, CVC  IVF: LR  REID: Y  PPx: PPI. Heparin  CONTACT: N Patient is a 75y old  Female who presents with a chief complaint of Unresponsive, post-cardiac arrest (08 Sep 2021 15:37)      BRIEF HOSPITAL COURSE:   76 yo f pmhx dementia, CVA, VDRF s/p trach/peg, quadriplegia, DM, HTN, GERD, constipation biba in from [facility] s/p cardiac arrest.  Per ED documentation, patient with cardiac arrest at [facility] (unknown exact downtime and if medications were administered), patient with ROSC in ED.  Patient admitted to [facility] with septic shock, pneumonia and UTI.    Events last 24 hours:   remains on levophed.  sputum with polymicrobial growth    PAST MEDICAL & SURGICAL HISTORY:  Dementia of frontal lobe type  Aphasic stroke  Diabetes mellitus  Respiratory failure  Hypertension  GERD (gastroesophageal reflux disease)  Constipation  Respiratory failure  CVA (cerebral vascular accident)  HTN (hypertension)  DM (diabetes mellitus)  Advanced dementia  COVID-19 virus detected  Quadriplegia  Pneumonia  Type II diabetes mellitus  Hx of appendectomy  Gastrostomy in place  Tracheostomy in place  Tracheostomy tube present  Feeding by G-tube      Allergies  codeine (Hives)      FAMILY HISTORY:  No pertinent family history in first degree relatives    Social History:   From [facility].       Review of Systems:  Unable to obtain 2/2 mental status.     Physical Examination: Unchanged    General: Elderly cachectic female, lying in bed, NAD    HEENT: NC/AT, trach site intact, connected to vent    PULM: Symmetrical thorax expansion upon respiration. Coarse to auscultation bilaterally, +thick yellow tinged foul smelling sputum production via tracheal suctioning    CVS: Regular rate and rhythm, no murmurs, rubs, or gallops    ABD: Soft, nondistended, nontender, normoactive bowel sounds, no masses appreciated, peg in place    EXT: Upper extremities contracted    SKIN: Warm and well perfused, no rashes noted.    NEURO: Opens eyes to verbal stimulation      Medications:  fluconAZOLE   Tablet 100 milliGRAM(s) Oral daily  piperacillin/tazobactam IVPB.. 3.375 Gram(s) IV Intermittent every 12 hours  norepinephrine Infusion 0.05 MICROgram(s)/kG/Min IV Continuous <Continuous>  albuterol/ipratropium for Nebulization 3 milliLiter(s) Nebulizer every 6 hours  acetaminophen   Tablet .. 650 milliGRAM(s) Oral every 6 hours PRN  fentaNYL   Patch  12 MICROgram(s)/Hr 1 Patch Transdermal every 72 hours  LORazepam   Injectable 0.5 milliGRAM(s) IV Push three times a day  aspirin  chewable 81 milliGRAM(s) Enteral Tube daily  heparin   Injectable 5000 Unit(s) SubCutaneous every 8 hours  bisacodyl Suppository 10 milliGRAM(s) Rectal at bedtime  pantoprazole  Injectable 40 milliGRAM(s) IV Push two times a day  polyethylene glycol 3350 17 Gram(s) Oral daily  senna 2 Tablet(s) Oral at bedtime  sucralfate suspension 1 Gram(s) Oral four times a day  dextrose 40% Gel 15 Gram(s) Oral once  dextrose 50% Injectable 25 Gram(s) IV Push once  dextrose 50% Injectable 12.5 Gram(s) IV Push once  dextrose 50% Injectable 25 Gram(s) IV Push once  glucagon  Injectable 1 milliGRAM(s) IntraMuscular once  insulin glargine Injectable (LANTUS) 36 Unit(s) SubCutaneous at bedtime  insulin lispro (ADMELOG) corrective regimen sliding scale   SubCutaneous every 6 hours  dextrose 5%. 1000 milliLiter(s) IV Continuous <Continuous>  dextrose 5%. 1000 milliLiter(s) IV Continuous <Continuous>  lactated ringers. 1000 milliLiter(s) IV Continuous <Continuous>  lactated ringers. 1000 milliLiter(s) IV Continuous <Continuous>  sodium chloride 0.9% lock flush 10 milliLiter(s) IV Push every 1 hour PRN  chlorhexidine 2% Cloths 1 Application(s) Topical daily  lactobacillus acidophilus 2 Tablet(s) Oral daily      Mode: AC/ CMV (Assist Control/ Continuous Mandatory Ventilation)  RR (machine): 14  TV (machine): 400  FiO2: 40  PEEP: 5  ITime: 1  MAP: 10  PIP: 29      ICU Vital Signs Last 24 Hrs  T(C): 37.2 (08 Sep 2021 16:14), Max: 37.2 (08 Sep 2021 03:00)  T(F): 99 (08 Sep 2021 16:14), Max: 99 (08 Sep 2021 06:45)  HR: 64 (08 Sep 2021 20:22) (56 - 107)  BP: 106/53 (08 Sep 2021 20:00) (83/45 - 180/100)  BP(mean): 70 (08 Sep 2021 20:00) (55 - 122)  ABP: --  ABP(mean): --  RR: 11 (08 Sep 2021 20:00) (6 - 46)  SpO2: 97% (08 Sep 2021 20:22) (95% - 100%)    Vital Signs Last 24 Hrs  T(C): 37.2 (08 Sep 2021 16:14), Max: 37.2 (08 Sep 2021 03:00)  T(F): 99 (08 Sep 2021 16:14), Max: 99 (08 Sep 2021 06:45)  HR: 64 (08 Sep 2021 20:22) (56 - 107)  BP: 106/53 (08 Sep 2021 20:00) (83/45 - 180/100)  BP(mean): 70 (08 Sep 2021 20:00) (55 - 122)  RR: 11 (08 Sep 2021 20:00) (6 - 46)  SpO2: 97% (08 Sep 2021 20:22) (95% - 100%)      I&O's Detail    07 Sep 2021 07:01  -  08 Sep 2021 07:00  --------------------------------------------------------  IN:    Enteral Tube Flush: 200 mL    Glucerna 1.5: 210 mL    IV PiggyBack: 350 mL    Lactated Ringers: 600 mL    Lactated Ringers: 1200 mL    Lactated Ringers Bolus: 1000 mL    Norepinephrine: 108.7 mL    sodium chloride 0.9%: 150 mL    Sodium Chloride 0.9% Bolus: 3000 mL  Total IN: 6818.7 mL    OUT:    Indwelling Catheter - Urethral (mL): 1600 mL    Voided (mL): 200 mL  Total OUT: 1800 mL  Total NET: 5018.7 mL      08 Sep 2021 07:01  -  08 Sep 2021 20:53  --------------------------------------------------------  IN:    Enteral Tube Flush: 75 mL    Glucerna 1.5: 140 mL    IV PiggyBack: 100 mL    Lactated Ringers: 900 mL    Norepinephrine: 63.3 mL  Total IN: 1278.3 mL    OUT:  Total OUT: 0 mL  Total NET: 1278.3 mL    LABS:                        7.7    7.65  )-----------( 289      ( 08 Sep 2021 09:03 )             25.9         141  |  102  |  59<H>  ----------------------------<  194<H>  3.1<L>   |  29  |  1.61<H>    Ca    7.9<L>      08 Sep 2021 06:55  Phos  3.8         TPro  6.2  /  Alb  2.2<L>  /  TBili  0.6  /  DBili  x   /  AST  118<H>  /  ALT  63<H>  /  AlkPhos  79      CARDIAC MARKERS ( 08 Sep 2021 06:55 )  .091 ng/mL / x     / x     / x     / x      CARDIAC MARKERS ( 07 Sep 2021 20:59 )  .123 ng/mL / x     / x     / x     / x      CARDIAC MARKERS ( 07 Sep 2021 10:38 )  .127 ng/mL / x     / x     / x     / x          CAPILLARY BLOOD GLUCOSE  POCT Blood Glucose.: 219 mg/dL (08 Sep 2021 17:14)      PT/INR - ( 08 Sep 2021 06:55 )   PT: 12.5 sec;   INR: 1.03 ratio    PTT - ( 07 Sep 2021 10:38 )  PTT:41.4 sec      Urinalysis Basic - ( 07 Sep 2021 10:41 )  Color: Yellow / Appearance: Turbid / S.010 / pH: x  Gluc: x / Ketone: Negative  / Bili: Negative / Urobili: Negative mg/dL   Blood: x / Protein: 500 mg/dL / Nitrite: Negative   Leuk Esterase: Moderate / RBC: 3-5 /HPF / WBC 26-50   Sq Epi: x / Non Sq Epi: Few / Bacteria: TNTC      CULTURES:  Culture Results:   No growth to date. ( @ 15:14)  Culture Results:   No growth to date. ( @ 15:14)  Culture Results:   >100,000 CFU/ml Proteus mirabilis ( @ 15:14)      RADIOLOGY:   < from: Xray Chest 1 View-PORTABLE IMMEDIATE (Xray Chest 1 View-PORTABLE IMMEDIATE .) (21 @ 20:59) >  EXAM:  XR CHEST AP OR PA 1V                          EXAM:  XR CHEST PORTABLE IMMED 1V                          PROCEDURE DATE:  2021      INTERPRETATION:  AP chest on 2021 at 1:38 PM. Patient is unresponsive.    Heart magnified by technique. Tracheostomy remains.    There is developing infiltrate off the left hilum compared to earlier in the day.    Follow-up AP chest on 2021 at 8:38 PM.    A left jugular line is been inserted in good position. Left lower perihilar infiltrate shows improvement.    IMPRESSION: Left lower perihilar infiltrate. Left jugular line inserted.    --- End of Report --    HANSEL ANDRADE MD; Attending Radiologist  This document has been electronically signed. 2021  9:26AM    < end of copied text >      SUPPLEMENTAL O2: AC/VC  LINES: Peripheral, CVC  IVF: LR  REID: Y  PPx: PPI. Heparin  CONTACT: N

## 2021-09-08 NOTE — DIETITIAN INITIAL EVALUATION ADULT. - OTHER INFO
Pt is a 76 y/o f trach dependent, with PEG tube, DM (A1c 8.5% 6/2021), HTN, GERD, CVA, dementia, constipation p/w being found unresponsive at [facility], brought in by EMS for post cardiac arrest care.  In ED, pt was given IVF NS bolus, was found to be hypotensive, and started on levophed for pressor support, and CT showed signs of aspiration PNA, an pt was started on IV zosyn.    Nutrition consult ordered for enteral nutrition PTA.  Per transfer records from [facility], pt was receiving TF via PEG of Glucerna 1.5 at 50ml/hr x20hrs for a total volume of 1,000ml/day, provides 1,500 kcal/day, 82.5 gram protein/day (+addtl 200ml before and after, +200ml at 2am= 600ml fluid/day in addition to tube feeding hourly flush).  Pt's spouse at bedside to provide nutrition related hx.  Per spouse, pt with hx constipation and occasionally has episodes of what appears to be resp distress but he and medical staff at [facility] have found that it is the patient trying to have a BM.  Pt's spouse appears very involved in patients care, states pt has been a resident at [facility] x 8yrs; reports pt weighed ~129# when first admitted there.  Weights have fluctuated over the years per , bed scale weight (9/8) 114.8#; adm weight from [facility] June 2021: 116#; transfer weight 120#; no significant wt changes recently per spouse.  GI following  for proctitis w/ colonic ileus (mild).  Tube feeding order currently active for Glucerna 1.5 @50ml/hr x24hrs (provides 1800kcal based on 34kcal/kg CBW, 99g protein based on 1.8g/kg CBW; exceeds recommended EEN.  Recommend Glucerna 1.5 to goal 50ml/hr x 20hrs (1,500 kcal based on 28kcal/kg CBW, 82.5 gram protein based on 1.5g/kg CBW).  Skin: R big toe open scab.  RD to follow-up and will continue to monitor pt's nutrition status.

## 2021-09-08 NOTE — PROGRESS NOTE ADULT - SUBJECTIVE AND OBJECTIVE BOX
[facility]EC[facility]    Patient is a 75y old  Female who presents with a chief complaint of Unresponsive, post-cardiac arrest (07 Sep 2021 21:29)       Allergies    codeine (Hives)    Intolerances        HPI:  74 y/o f trach dependent, with PEG tube, DM, HTN, GERD, CVA, dementia, constipation p/w being found unresponsive at [facility], brought in by EMS for post cardiac arrest care.       In ED, pt was given IVF NS bolus, was foudn to be hypotensive, and started on levophed for pressor support, and CT showed signs of aspiration PNA, an dpt was started on IV zosyn.     (07 Sep 2021 12:19)      PAST MEDICAL & SURGICAL HISTORY:  Dementia of frontal lobe type    Aphasic stroke    Diabetes mellitus    Respiratory failure    Hypertension    GERD (gastroesophageal reflux disease)    Constipation    Respiratory failure    CVA (cerebral vascular accident)    HTN (hypertension)    DM (diabetes mellitus)    Advanced dementia    COVID-19 virus detected    Quadriplegia    Pneumonia    Type II diabetes mellitus    Hx of appendectomy    Gastrostomy in place    Tracheostomy in place    Tracheostomy tube present    Feeding by G-tube        FAMILY HISTORY:  No pertinent family history in first degree relatives          MEDICATIONS   acetaminophen   Tablet .. 650 milliGRAM(s) Oral every 6 hours PRN  albuterol/ipratropium for Nebulization 3 milliLiter(s) Nebulizer every 6 hours  aspirin  chewable 81 milliGRAM(s) Enteral Tube daily  bisacodyl Suppository 10 milliGRAM(s) Rectal at bedtime  chlorhexidine 2% Cloths 1 Application(s) Topical daily  dextrose 40% Gel 15 Gram(s) Oral once  dextrose 5%. 1000 milliLiter(s) IV Continuous <Continuous>  dextrose 5%. 1000 milliLiter(s) IV Continuous <Continuous>  dextrose 50% Injectable 25 Gram(s) IV Push once  dextrose 50% Injectable 12.5 Gram(s) IV Push once  dextrose 50% Injectable 25 Gram(s) IV Push once  fentaNYL   Patch  12 MICROgram(s)/Hr 1 Patch Transdermal every 72 hours  fluconAZOLE   Tablet 100 milliGRAM(s) Oral daily  glucagon  Injectable 1 milliGRAM(s) IntraMuscular once  heparin   Injectable 5000 Unit(s) SubCutaneous every 8 hours  insulin glargine Injectable (LANTUS) 36 Unit(s) SubCutaneous at bedtime  insulin lispro (ADMELOG) corrective regimen sliding scale   SubCutaneous every 6 hours  lactated ringers. 1000 milliLiter(s) IV Continuous <Continuous>  lactated ringers. 1000 milliLiter(s) IV Continuous <Continuous>  lactobacillus acidophilus 2 Tablet(s) Oral daily  LORazepam   Injectable 0.5 milliGRAM(s) IV Push three times a day  norepinephrine Infusion 0.05 MICROgram(s)/kG/Min IV Continuous <Continuous>  pantoprazole  Injectable 40 milliGRAM(s) IV Push daily  piperacillin/tazobactam IVPB.. 3.375 Gram(s) IV Intermittent every 12 hours  polyethylene glycol 3350 17 Gram(s) Oral daily  potassium chloride  10 mEq/100 mL IVPB 10 milliEquivalent(s) IV Intermittent once  senna 2 Tablet(s) Oral at bedtime  sodium chloride 0.9% lock flush 10 milliLiter(s) IV Push every 1 hour PRN  sucralfate suspension 1 Gram(s) Oral four times a day      Vital Signs Last 24 Hrs  T(C): 37.2 (08 Sep 2021 06:45), Max: 38.4 (07 Sep 2021 09:55)  T(F): 99 (08 Sep 2021 06:45), Max: 101.1 (07 Sep 2021 09:55)  HR: 64 (08 Sep 2021 07:28) (47 - 130)  BP: 115/50 (08 Sep 2021 06:45) (55/33 - 184/66)  BP(mean): 69 (08 Sep 2021 06:45) (36 - 83)  RR: 15 (08 Sep 2021 06:45) (0 - 40)  SpO2: 99% (08 Sep 2021 07:28) (93% - 100%)      21 @ 07:01  -  21 @ 07:00  --------------------------------------------------------  IN: 6818.7 mL / OUT: 1800 mL / NET: 5018.7 mL        Mode: AC/ CMV (Assist Control/ Continuous Mandatory Ventilation), RR (machine): 14, TV (machine): 400, FiO2: 30, PEEP: 5, ITime: 1, MAP: 9, PIP: 28    LABS:                        7.6    8.44  )-----------( 284      ( 08 Sep 2021 06:55 )             25.5         141  |  102  |  59<H>  ----------------------------<  194<H>  3.1<L>   |  29  |  1.61<H>    Ca    7.9<L>      08 Sep 2021 06:55  Phos  3.8         TPro  6.2  /  Alb  2.2<L>  /  TBili  0.6  /  DBili  x   /  AST  118<H>  /  ALT  63<H>  /  AlkPhos  79      PT/INR - ( 08 Sep 2021 06:55 )   PT: 12.5 sec;   INR: 1.03 ratio         PTT - ( 07 Sep 2021 10:38 )  PTT:41.4 sec  Urinalysis Basic - ( 07 Sep 2021 10:41 )    Color: Yellow / Appearance: Turbid / S.010 / pH: x  Gluc: x / Ketone: Negative  / Bili: Negative / Urobili: Negative mg/dL   Blood: x / Protein: 500 mg/dL / Nitrite: Negative   Leuk Esterase: Moderate / RBC: 3-5 /HPF / WBC 26-50   Sq Epi: x / Non Sq Epi: Few / Bacteria: TNTC            WBC:  WBC Count: 8.44 K/uL ( @ 06:55)  WBC Count: 26.45 K/uL ( @ 10:38)      MICROBIOLOGY:  RECENT CULTURES:        CARDIAC MARKERS ( 08 Sep 2021 06:55 )  .091 ng/mL / x     / x     / x     / x      CARDIAC MARKERS ( 07 Sep 2021 20:59 )  .123 ng/mL / x     / x     / x     / x      CARDIAC MARKERS ( 07 Sep 2021 10:38 )  .127 ng/mL / x     / x     / x     / x            PT/INR - ( 08 Sep 2021 06:55 )   PT: 12.5 sec;   INR: 1.03 ratio         PTT - ( 07 Sep 2021 10:38 )  PTT:41.4 sec    Sodium:  Sodium, Serum: 141 mmol/L ( @ 06:55)  Sodium, Serum: 140 mmol/L ( @ 10:56)      1.61 mg/dL  @ 06:55  2.58 mg/dL  @ 10:56      Hemoglobin:  Hemoglobin: 7.6 g/dL ( @ 06:55)  Hemoglobin: 11.6 g/dL ( @ 10:38)      Platelets: Platelet Count - Automated: 284 K/uL ( @ 06:55)  Platelet Count - Automated: 362 K/uL ( @ 10:38)      LIVER FUNCTIONS - ( 08 Sep 2021 06:55 )  Alb: 2.2 g/dL / Pro: 6.2 g/dL / ALK PHOS: 79 U/L / ALT: 63 U/L DA / AST: 118 U/L / GGT: x             Urinalysis Basic - ( 07 Sep 2021 10:41 )    Color: Yellow / Appearance: Turbid / S.010 / pH: x  Gluc: x / Ketone: Negative  / Bili: Negative / Urobili: Negative mg/dL   Blood: x / Protein: 500 mg/dL / Nitrite: Negative   Leuk Esterase: Moderate / RBC: 3-5 /HPF / WBC 26-50   Sq Epi: x / Non Sq Epi: Few / Bacteria: TNTC        RADIOLOGY & ADDITIONAL STUDIES:

## 2021-09-08 NOTE — DIETITIAN INITIAL EVALUATION ADULT. - PERTINENT MEDS FT
MEDICATIONS  (STANDING):  albuterol/ipratropium for Nebulization 3 milliLiter(s) Nebulizer every 6 hours  aspirin  chewable 81 milliGRAM(s) Enteral Tube daily  bisacodyl Suppository 10 milliGRAM(s) Rectal at bedtime  chlorhexidine 2% Cloths 1 Application(s) Topical daily  dextrose 40% Gel 15 Gram(s) Oral once  dextrose 5%. 1000 milliLiter(s) (50 mL/Hr) IV Continuous <Continuous>  dextrose 5%. 1000 milliLiter(s) (100 mL/Hr) IV Continuous <Continuous>  dextrose 50% Injectable 25 Gram(s) IV Push once  dextrose 50% Injectable 12.5 Gram(s) IV Push once  dextrose 50% Injectable 25 Gram(s) IV Push once  fentaNYL   Patch  12 MICROgram(s)/Hr 1 Patch Transdermal every 72 hours  fluconAZOLE   Tablet 100 milliGRAM(s) Oral daily  glucagon  Injectable 1 milliGRAM(s) IntraMuscular once  heparin   Injectable 5000 Unit(s) SubCutaneous every 8 hours  insulin glargine Injectable (LANTUS) 36 Unit(s) SubCutaneous at bedtime  insulin lispro (ADMELOG) corrective regimen sliding scale   SubCutaneous every 6 hours  lactated ringers. 1000 milliLiter(s) (150 mL/Hr) IV Continuous <Continuous>  lactated ringers. 1000 milliLiter(s) (75 mL/Hr) IV Continuous <Continuous>  lactobacillus acidophilus 2 Tablet(s) Oral daily  LORazepam   Injectable 0.5 milliGRAM(s) IV Push three times a day  norepinephrine Infusion 0.05 MICROgram(s)/kG/Min (2.77 mL/Hr) IV Continuous <Continuous>  pantoprazole  Injectable 40 milliGRAM(s) IV Push two times a day  piperacillin/tazobactam IVPB.. 3.375 Gram(s) IV Intermittent every 12 hours  polyethylene glycol 3350 17 Gram(s) Oral daily  senna 2 Tablet(s) Oral at bedtime  sucralfate suspension 1 Gram(s) Oral four times a day    MEDICATIONS  (PRN):  acetaminophen   Tablet .. 650 milliGRAM(s) Oral every 6 hours PRN Temp greater or equal to 38C (100.4F), Mild Pain (1 - 3)  sodium chloride 0.9% lock flush 10 milliLiter(s) IV Push every 1 hour PRN Pre/post blood products, medications, blood draw, and to maintain line patency

## 2021-09-08 NOTE — DIETITIAN INITIAL EVALUATION ADULT. - PROBLEM SELECTOR PLAN 2
Suggestion of bilateral aspiration pneumonia on CT chest  - as per ID consult Dr. Brendon quintanilla, pt started on IV zosyn, will cont for now  - Hypotension, started on levophed; elevated WBC to 22, will trend on CBC, and monitor for fevers  - NPO, pt has peg tube  - tylenol prn fever, pain

## 2021-09-09 DIAGNOSIS — D62 ACUTE POSTHEMORRHAGIC ANEMIA: ICD-10-CM

## 2021-09-09 DIAGNOSIS — K56.7 ILEUS, UNSPECIFIED: ICD-10-CM

## 2021-09-09 DIAGNOSIS — K21.9 GASTRO-ESOPHAGEAL REFLUX DISEASE WITHOUT ESOPHAGITIS: ICD-10-CM

## 2021-09-09 DIAGNOSIS — A41.9 SEPSIS, UNSPECIFIED ORGANISM: ICD-10-CM

## 2021-09-09 DIAGNOSIS — I46.9 CARDIAC ARREST, CAUSE UNSPECIFIED: ICD-10-CM

## 2021-09-09 DIAGNOSIS — I10 ESSENTIAL (PRIMARY) HYPERTENSION: ICD-10-CM

## 2021-09-09 DIAGNOSIS — E11.9 TYPE 2 DIABETES MELLITUS WITHOUT COMPLICATIONS: ICD-10-CM

## 2021-09-09 DIAGNOSIS — Z29.9 ENCOUNTER FOR PROPHYLACTIC MEASURES, UNSPECIFIED: ICD-10-CM

## 2021-09-09 DIAGNOSIS — R25.8 OTHER ABNORMAL INVOLUNTARY MOVEMENTS: ICD-10-CM

## 2021-09-09 LAB
-  AMIKACIN: SIGNIFICANT CHANGE UP
-  AMOXICILLIN/CLAVULANIC ACID: SIGNIFICANT CHANGE UP
-  AMPICILLIN/SULBACTAM: SIGNIFICANT CHANGE UP
-  AMPICILLIN: SIGNIFICANT CHANGE UP
-  AZTREONAM: SIGNIFICANT CHANGE UP
-  CEFAZOLIN: SIGNIFICANT CHANGE UP
-  CEFEPIME: SIGNIFICANT CHANGE UP
-  CEFOXITIN: SIGNIFICANT CHANGE UP
-  CEFTRIAXONE: SIGNIFICANT CHANGE UP
-  CIPROFLOXACIN: SIGNIFICANT CHANGE UP
-  ERTAPENEM: SIGNIFICANT CHANGE UP
-  GENTAMICIN: SIGNIFICANT CHANGE UP
-  LEVOFLOXACIN: SIGNIFICANT CHANGE UP
-  MEROPENEM: SIGNIFICANT CHANGE UP
-  NITROFURANTOIN: SIGNIFICANT CHANGE UP
-  PIPERACILLIN/TAZOBACTAM: SIGNIFICANT CHANGE UP
-  TOBRAMYCIN: SIGNIFICANT CHANGE UP
-  TRIMETHOPRIM/SULFAMETHOXAZOLE: SIGNIFICANT CHANGE UP
ALBUMIN SERPL ELPH-MCNC: 2 G/DL — LOW (ref 3.3–5)
ALP SERPL-CCNC: 80 U/L — SIGNIFICANT CHANGE UP (ref 30–120)
ALT FLD-CCNC: 49 U/L DA — SIGNIFICANT CHANGE UP (ref 10–60)
ANION GAP SERPL CALC-SCNC: 6 MMOL/L — SIGNIFICANT CHANGE UP (ref 5–17)
AST SERPL-CCNC: 61 U/L — HIGH (ref 10–40)
BILIRUB SERPL-MCNC: 0.3 MG/DL — SIGNIFICANT CHANGE UP (ref 0.2–1.2)
BLD GP AB SCN SERPL QL: SIGNIFICANT CHANGE UP
BUN SERPL-MCNC: 37 MG/DL — HIGH (ref 7–23)
CALCIUM SERPL-MCNC: 8.1 MG/DL — LOW (ref 8.4–10.5)
CHLORIDE SERPL-SCNC: 107 MMOL/L — SIGNIFICANT CHANGE UP (ref 96–108)
CO2 SERPL-SCNC: 29 MMOL/L — SIGNIFICANT CHANGE UP (ref 22–31)
CREAT SERPL-MCNC: 1.24 MG/DL — SIGNIFICANT CHANGE UP (ref 0.5–1.3)
CULTURE RESULTS: SIGNIFICANT CHANGE UP
GLUCOSE BLDC GLUCOMTR-MCNC: 122 MG/DL — HIGH (ref 70–99)
GLUCOSE BLDC GLUCOMTR-MCNC: 154 MG/DL — HIGH (ref 70–99)
GLUCOSE BLDC GLUCOMTR-MCNC: 156 MG/DL — HIGH (ref 70–99)
GLUCOSE BLDC GLUCOMTR-MCNC: 159 MG/DL — HIGH (ref 70–99)
GLUCOSE BLDC GLUCOMTR-MCNC: 168 MG/DL — HIGH (ref 70–99)
GLUCOSE SERPL-MCNC: 152 MG/DL — HIGH (ref 70–99)
HCT VFR BLD CALC: 24 % — LOW (ref 34.5–45)
HCT VFR BLD CALC: 24.5 % — LOW (ref 34.5–45)
HGB BLD-MCNC: 7 G/DL — CRITICAL LOW (ref 11.5–15.5)
HGB BLD-MCNC: 7.3 G/DL — LOW (ref 11.5–15.5)
INR BLD: 1.03 RATIO — SIGNIFICANT CHANGE UP (ref 0.88–1.16)
IRON SATN MFR SERPL: 10 % — LOW (ref 14–50)
IRON SATN MFR SERPL: 23 UG/DL — LOW (ref 30–160)
MAGNESIUM SERPL-MCNC: 2.5 MG/DL — SIGNIFICANT CHANGE UP (ref 1.6–2.6)
MCHC RBC-ENTMCNC: 25.8 PG — LOW (ref 27–34)
MCHC RBC-ENTMCNC: 26.7 PG — LOW (ref 27–34)
MCHC RBC-ENTMCNC: 29.2 GM/DL — LOW (ref 32–36)
MCHC RBC-ENTMCNC: 29.8 GM/DL — LOW (ref 32–36)
MCV RBC AUTO: 88.6 FL — SIGNIFICANT CHANGE UP (ref 80–100)
MCV RBC AUTO: 89.7 FL — SIGNIFICANT CHANGE UP (ref 80–100)
METHOD TYPE: SIGNIFICANT CHANGE UP
NRBC # BLD: 0 /100 WBCS — SIGNIFICANT CHANGE UP (ref 0–0)
NRBC # BLD: 0 /100 WBCS — SIGNIFICANT CHANGE UP (ref 0–0)
OB PNL STL: POSITIVE
ORGANISM # SPEC MICROSCOPIC CNT: SIGNIFICANT CHANGE UP
ORGANISM # SPEC MICROSCOPIC CNT: SIGNIFICANT CHANGE UP
PHOSPHATE SERPL-MCNC: 2 MG/DL — LOW (ref 2.5–4.5)
PLATELET # BLD AUTO: 222 K/UL — SIGNIFICANT CHANGE UP (ref 150–400)
PLATELET # BLD AUTO: 226 K/UL — SIGNIFICANT CHANGE UP (ref 150–400)
POTASSIUM SERPL-MCNC: 3.1 MMOL/L — LOW (ref 3.5–5.3)
POTASSIUM SERPL-SCNC: 3.1 MMOL/L — LOW (ref 3.5–5.3)
PROT SERPL-MCNC: 6 G/DL — SIGNIFICANT CHANGE UP (ref 6–8.3)
PROTHROM AB SERPL-ACNC: 12.4 SEC — SIGNIFICANT CHANGE UP (ref 10.6–13.6)
RBC # BLD: 2.71 M/UL — LOW (ref 3.8–5.2)
RBC # BLD: 2.73 M/UL — LOW (ref 3.8–5.2)
RBC # FLD: 16.2 % — HIGH (ref 10.3–14.5)
RBC # FLD: 16.2 % — HIGH (ref 10.3–14.5)
SODIUM SERPL-SCNC: 142 MMOL/L — SIGNIFICANT CHANGE UP (ref 135–145)
SPECIMEN SOURCE: SIGNIFICANT CHANGE UP
TIBC SERPL-MCNC: 229 UG/DL — SIGNIFICANT CHANGE UP (ref 220–430)
TROPONIN I SERPL-MCNC: 0.04 NG/ML — SIGNIFICANT CHANGE UP (ref 0.02–0.06)
UIBC SERPL-MCNC: 206 UG/DL — SIGNIFICANT CHANGE UP (ref 110–370)
WBC # BLD: 4.84 K/UL — SIGNIFICANT CHANGE UP (ref 3.8–10.5)
WBC # BLD: 7.69 K/UL — SIGNIFICANT CHANGE UP (ref 3.8–10.5)
WBC # FLD AUTO: 4.84 K/UL — SIGNIFICANT CHANGE UP (ref 3.8–10.5)
WBC # FLD AUTO: 7.69 K/UL — SIGNIFICANT CHANGE UP (ref 3.8–10.5)

## 2021-09-09 PROCEDURE — 74018 RADEX ABDOMEN 1 VIEW: CPT | Mod: 26

## 2021-09-09 PROCEDURE — 71045 X-RAY EXAM CHEST 1 VIEW: CPT | Mod: 26

## 2021-09-09 PROCEDURE — 99233 SBSQ HOSP IP/OBS HIGH 50: CPT

## 2021-09-09 RX ORDER — VALPROIC ACID (AS SODIUM SALT) 250 MG/5ML
1000 SOLUTION, ORAL ORAL ONCE
Refills: 0 | Status: COMPLETED | OUTPATIENT
Start: 2021-09-09 | End: 2021-09-09

## 2021-09-09 RX ORDER — POTASSIUM CHLORIDE 20 MEQ
20 PACKET (EA) ORAL
Refills: 0 | Status: COMPLETED | OUTPATIENT
Start: 2021-09-09 | End: 2021-09-09

## 2021-09-09 RX ORDER — POTASSIUM PHOSPHATE, MONOBASIC POTASSIUM PHOSPHATE, DIBASIC 236; 224 MG/ML; MG/ML
15 INJECTION, SOLUTION INTRAVENOUS ONCE
Refills: 0 | Status: COMPLETED | OUTPATIENT
Start: 2021-09-09 | End: 2021-09-09

## 2021-09-09 RX ORDER — DIVALPROEX SODIUM 500 MG/1
500 TABLET, DELAYED RELEASE ORAL
Refills: 0 | Status: DISCONTINUED | OUTPATIENT
Start: 2021-09-09 | End: 2021-09-09

## 2021-09-09 RX ORDER — SIMETHICONE 80 MG/1
80 TABLET, CHEWABLE ORAL EVERY 6 HOURS
Refills: 0 | Status: DISCONTINUED | OUTPATIENT
Start: 2021-09-09 | End: 2021-09-15

## 2021-09-09 RX ORDER — CHLORHEXIDINE GLUCONATE 213 G/1000ML
15 SOLUTION TOPICAL
Refills: 0 | Status: DISCONTINUED | OUTPATIENT
Start: 2021-09-09 | End: 2021-09-15

## 2021-09-09 RX ORDER — INSULIN LISPRO 100/ML
VIAL (ML) SUBCUTANEOUS EVERY 6 HOURS
Refills: 0 | Status: DISCONTINUED | OUTPATIENT
Start: 2021-09-09 | End: 2021-09-15

## 2021-09-09 RX ADMIN — Medication 10 MILLIGRAM(S): at 21:38

## 2021-09-09 RX ADMIN — FENTANYL CITRATE 1 PATCH: 50 INJECTION INTRAVENOUS at 19:00

## 2021-09-09 RX ADMIN — Medication 1 MILLIGRAM(S): at 11:09

## 2021-09-09 RX ADMIN — Medication 650 MILLIGRAM(S): at 00:02

## 2021-09-09 RX ADMIN — Medication 2 TABLET(S): at 12:28

## 2021-09-09 RX ADMIN — SENNA PLUS 2 TABLET(S): 8.6 TABLET ORAL at 21:40

## 2021-09-09 RX ADMIN — PANTOPRAZOLE SODIUM 40 MILLIGRAM(S): 20 TABLET, DELAYED RELEASE ORAL at 05:09

## 2021-09-09 RX ADMIN — Medication 50 MILLIEQUIVALENT(S): at 08:57

## 2021-09-09 RX ADMIN — FENTANYL CITRATE 1 PATCH: 50 INJECTION INTRAVENOUS at 07:55

## 2021-09-09 RX ADMIN — FLUCONAZOLE 100 MILLIGRAM(S): 150 TABLET ORAL at 12:29

## 2021-09-09 RX ADMIN — POTASSIUM PHOSPHATE, MONOBASIC POTASSIUM PHOSPHATE, DIBASIC 62.5 MILLIMOLE(S): 236; 224 INJECTION, SOLUTION INTRAVENOUS at 09:47

## 2021-09-09 RX ADMIN — Medication 50 MILLIEQUIVALENT(S): at 11:10

## 2021-09-09 RX ADMIN — CHLORHEXIDINE GLUCONATE 1 APPLICATION(S): 213 SOLUTION TOPICAL at 12:28

## 2021-09-09 RX ADMIN — Medication 3 MILLILITER(S): at 00:47

## 2021-09-09 RX ADMIN — Medication 81 MILLIGRAM(S): at 12:28

## 2021-09-09 RX ADMIN — Medication 1 GRAM(S): at 12:29

## 2021-09-09 RX ADMIN — Medication 3 MILLILITER(S): at 06:53

## 2021-09-09 RX ADMIN — Medication 0.5 MILLIGRAM(S): at 21:38

## 2021-09-09 RX ADMIN — Medication 1 GRAM(S): at 23:46

## 2021-09-09 RX ADMIN — Medication 0.5 MILLIGRAM(S): at 05:09

## 2021-09-09 RX ADMIN — Medication 25 MILLIGRAM(S): at 11:59

## 2021-09-09 RX ADMIN — Medication 2: at 18:10

## 2021-09-09 RX ADMIN — PANTOPRAZOLE SODIUM 40 MILLIGRAM(S): 20 TABLET, DELAYED RELEASE ORAL at 18:11

## 2021-09-09 RX ADMIN — SIMETHICONE 80 MILLIGRAM(S): 80 TABLET, CHEWABLE ORAL at 23:46

## 2021-09-09 RX ADMIN — Medication 3 MILLILITER(S): at 13:22

## 2021-09-09 RX ADMIN — SIMETHICONE 80 MILLIGRAM(S): 80 TABLET, CHEWABLE ORAL at 18:11

## 2021-09-09 RX ADMIN — SODIUM CHLORIDE 75 MILLILITER(S): 9 INJECTION, SOLUTION INTRAVENOUS at 18:11

## 2021-09-09 RX ADMIN — PIPERACILLIN AND TAZOBACTAM 25 GRAM(S): 4; .5 INJECTION, POWDER, LYOPHILIZED, FOR SOLUTION INTRAVENOUS at 05:01

## 2021-09-09 RX ADMIN — Medication 1 MILLIGRAM(S): at 11:43

## 2021-09-09 RX ADMIN — Medication 1 GRAM(S): at 05:09

## 2021-09-09 RX ADMIN — Medication 1 GRAM(S): at 18:11

## 2021-09-09 RX ADMIN — HEPARIN SODIUM 5000 UNIT(S): 5000 INJECTION INTRAVENOUS; SUBCUTANEOUS at 05:09

## 2021-09-09 RX ADMIN — Medication 2 MILLIGRAM(S): at 12:20

## 2021-09-09 RX ADMIN — PIPERACILLIN AND TAZOBACTAM 25 GRAM(S): 4; .5 INJECTION, POWDER, LYOPHILIZED, FOR SOLUTION INTRAVENOUS at 14:46

## 2021-09-09 RX ADMIN — INSULIN GLARGINE 36 UNIT(S): 100 INJECTION, SOLUTION SUBCUTANEOUS at 21:38

## 2021-09-09 RX ADMIN — Medication 50 MILLIEQUIVALENT(S): at 12:28

## 2021-09-09 RX ADMIN — Medication 3 MILLILITER(S): at 19:59

## 2021-09-09 NOTE — PROVIDER CONTACT NOTE (EICU) - SITUATION
eLerted by bedside team. hgb 7.0 from 7.6.  No active signs of bleeding.  Hemodynamically stable not on hemodynamic support.  6.8L positive total length of stay.  Possible dilutional.  Given absence of cardiac hx, active bleeding, worsening respiratory status and hemodynamic instability, will hold off on blood transfusion for now.      Potassium and phosphorus repletions ordered as well.
Acute onset tachypnea and tachycardia. Brief episode of eye deviation towards left.
new admission, respiratory failure in shock state

## 2021-09-09 NOTE — PROGRESS NOTE ADULT - SUBJECTIVE AND OBJECTIVE BOX
Chief Complaint:        INTERVAL HPI/OVERNIGHT EVENTS:    no significant events overnight reported     MEDICATIONS  (STANDING):  albuterol/ipratropium for Nebulization 3 milliLiter(s) Nebulizer every 6 hours  aspirin  chewable 81 milliGRAM(s) Enteral Tube daily  bisacodyl Suppository 10 milliGRAM(s) Rectal at bedtime  chlorhexidine 2% Cloths 1 Application(s) Topical daily  dextrose 40% Gel 15 Gram(s) Oral once  dextrose 5%. 1000 milliLiter(s) (50 mL/Hr) IV Continuous <Continuous>  dextrose 5%. 1000 milliLiter(s) (100 mL/Hr) IV Continuous <Continuous>  dextrose 50% Injectable 25 Gram(s) IV Push once  dextrose 50% Injectable 25 Gram(s) IV Push once  dextrose 50% Injectable 12.5 Gram(s) IV Push once  fentaNYL   Patch  12 MICROgram(s)/Hr 1 Patch Transdermal every 72 hours  fluconAZOLE   Tablet 100 milliGRAM(s) Oral daily  glucagon  Injectable 1 milliGRAM(s) IntraMuscular once  insulin glargine Injectable (LANTUS) 36 Unit(s) SubCutaneous at bedtime  insulin lispro (ADMELOG) corrective regimen sliding scale   SubCutaneous every 6 hours  lactated ringers. 1000 milliLiter(s) (75 mL/Hr) IV Continuous <Continuous>  lactobacillus acidophilus 2 Tablet(s) Oral daily  LORazepam   Injectable 0.5 milliGRAM(s) IV Push three times a day  norepinephrine Infusion 0.05 MICROgram(s)/kG/Min (2.77 mL/Hr) IV Continuous <Continuous>  pantoprazole  Injectable 40 milliGRAM(s) IV Push two times a day  piperacillin/tazobactam IVPB.. 3.375 Gram(s) IV Intermittent every 12 hours  polyethylene glycol 3350 17 Gram(s) Oral daily  senna 2 Tablet(s) Oral at bedtime  sucralfate suspension 1 Gram(s) Oral four times a day    MEDICATIONS  (PRN):  acetaminophen   Tablet .. 650 milliGRAM(s) Oral every 6 hours PRN Temp greater or equal to 38C (100.4F), Mild Pain (1 - 3)  sodium chloride 0.9% lock flush 10 milliLiter(s) IV Push every 1 hour PRN Pre/post blood products, medications, blood draw, and to maintain line patency            Vital Signs Last 24 Hrs  T(C): 37.7 (09 Sep 2021 12:00), Max: 38.1 (09 Sep 2021 00:00)  T(F): 99.9 (09 Sep 2021 12:00), Max: 100.6 (09 Sep 2021 00:00)  HR: 82 (09 Sep 2021 13:22) (62 - 135)  BP: 97/52 (09 Sep 2021 13:00) (94/56 - 196/48)  BP(mean): 66 (09 Sep 2021 13:00) (62 - 90)  RR: 17 (09 Sep 2021 13:00) (1 - 50)  SpO2: 97% (09 Sep 2021 13:22) (96% - 100%)    Physical exam:    heent trach  cv s1s2  chest air entry  abd soft,peg        LABS:                        7.3    7.69  )-----------( 222      ( 09 Sep 2021 15:06 )             24.5     09-09    142  |  107  |  37<H>  ----------------------------<  152<H>  3.1<L>   |  29  |  1.24    Ca    8.1<L>      09 Sep 2021 07:04  Phos  2.0     09-09  Mg     2.5     09-09    TPro  6.0  /  Alb  2.0<L>  /  TBili  0.3  /  DBili  x   /  AST  61<H>  /  ALT  49  /  AlkPhos  80  09-09    PT/INR - ( 09 Sep 2021 07:04 )   PT: 12.4 sec;   INR: 1.03 ratio               RADIOLOGY & ADDITIONAL TESTS:

## 2021-09-09 NOTE — PROGRESS NOTE ADULT - ASSESSMENT
- anemia, guaiac +     Lft elevatoin     proctitis w/ colonic ileus (mild)      reflux    - monitor stools re signs for bleeding, no egd planned     IV Protonix BId. No plan for scope.        monitor Lft-likely mild elevation component of ischemic hep     trend daily       avoid hepatotoxic meds     feed enterally via peg

## 2021-09-09 NOTE — PROGRESS NOTE ADULT - SUBJECTIVE AND OBJECTIVE BOX
Patient is a 75y old  Female who presents with a chief complaint of Unresponsive, post-cardiac arrest (09 Sep 2021 10:18)    INTERVAL HPI/OVERNIGHT EVENTS:  called by RN, patient with episodes of jerky movements.  eyes looking toward her right and her body contorting toward the left associated with tachycardia, tachypnea, and diaphoresis.  During first episode given Ativan 1mg Iv.  Symptoms resolved then 15 minutes later started having repeated episodes another ativan 1mg IV given.  Discussed case with Dr Romo from EICU and Dr Shin from Neurology.  Depakote load 1gm IV x1 then 500mg via PEG BID started.    No fever, very minimal sputum from Trach able to be suctioned.     MEDICATIONS  (STANDING):  albuterol/ipratropium for Nebulization 3 milliLiter(s) Nebulizer every 6 hours  aspirin  chewable 81 milliGRAM(s) Enteral Tube daily  bisacodyl Suppository 10 milliGRAM(s) Rectal at bedtime  chlorhexidine 2% Cloths 1 Application(s) Topical daily  dextrose 40% Gel 15 Gram(s) Oral once  dextrose 5%. 1000 milliLiter(s) (50 mL/Hr) IV Continuous <Continuous>  dextrose 5%. 1000 milliLiter(s) (100 mL/Hr) IV Continuous <Continuous>  dextrose 50% Injectable 25 Gram(s) IV Push once  dextrose 50% Injectable 12.5 Gram(s) IV Push once  dextrose 50% Injectable 25 Gram(s) IV Push once  diVALproex Sprinkle 500 milliGRAM(s) Oral two times a day  fentaNYL   Patch  12 MICROgram(s)/Hr 1 Patch Transdermal every 72 hours  fluconAZOLE   Tablet 100 milliGRAM(s) Oral daily  glucagon  Injectable 1 milliGRAM(s) IntraMuscular once  insulin glargine Injectable (LANTUS) 36 Unit(s) SubCutaneous at bedtime  insulin lispro (ADMELOG) corrective regimen sliding scale   SubCutaneous every 6 hours  lactated ringers. 1000 milliLiter(s) (75 mL/Hr) IV Continuous <Continuous>  lactobacillus acidophilus 2 Tablet(s) Oral daily  LORazepam   Injectable 0.5 milliGRAM(s) IV Push three times a day  norepinephrine Infusion 0.05 MICROgram(s)/kG/Min (2.77 mL/Hr) IV Continuous <Continuous>  pantoprazole  Injectable 40 milliGRAM(s) IV Push two times a day  piperacillin/tazobactam IVPB.. 3.375 Gram(s) IV Intermittent every 12 hours  polyethylene glycol 3350 17 Gram(s) Oral daily  potassium chloride  20 mEq/100 mL IVPB 20 milliEquivalent(s) IV Intermittent every 2 hours  senna 2 Tablet(s) Oral at bedtime  sucralfate suspension 1 Gram(s) Oral four times a day  valproate sodium IVPB 1000 milliGRAM(s) IV Intermittent once    MEDICATIONS  (PRN):  acetaminophen   Tablet .. 650 milliGRAM(s) Oral every 6 hours PRN Temp greater or equal to 38C (100.4F), Mild Pain (1 - 3)  sodium chloride 0.9% lock flush 10 milliLiter(s) IV Push every 1 hour PRN Pre/post blood products, medications, blood draw, and to maintain line patency    Allergies  codeine (Hives)    REVIEW OF SYSTEMS:  unable to obtain.     Vital Signs Last 24 Hrs  T(C): 37.7 (09 Sep 2021 08:30), Max: 38.1 (09 Sep 2021 00:00)  T(F): 99.8 (09 Sep 2021 08:30), Max: 100.6 (09 Sep 2021 00:00)  HR: 90 (09 Sep 2021 11:20) (62 - 107)  BP: 99/49 (09 Sep 2021 09:00) (88/63 - 180/100)  BP(mean): 65 (09 Sep 2021 09:00) (57 - 122)  RR: 13 (09 Sep 2021 09:00) (1 - 46)  SpO2: 98% (09 Sep 2021 11:20) (95% - 100%)    PHYSICAL EXAM:  GENERAL: diaphoretic, tachycardic, tachypneic  EYES: eyes jerking toward right, conjunctiva and sclera clear  ENMT:trach/ PEG  NERVOUS SYSTEM:  see hpi - patient with jerky upper body movements toward the left, with upper ext contractures.  with jerky open/closing jaw movements and eyes gazing toward her right.   CHEST/LUNG:  scattered rhonchi  HEART: Regular tachycardia  ABDOMEN: Soft, Nontender, Nondistended; PEG present  EXTREMITIES:  2+ Peripheral Pulses, No clubbing, cyanosis, or edema  SKIN: No rashes or lesions; diaphoresis    LABS:                        7.0    4.84  )-----------( 226      ( 09 Sep 2021 07:04 )             24.0     09 Sep 2021 07:04    142    |  107    |  37     ----------------------------<  152    3.1     |  29     |  1.24     Ca    8.1        09 Sep 2021 07:04  Phos  2.0       09 Sep 2021 07:04    TPro  6.0    /  Alb  2.0    /  TBili  0.3    /  DBili  x      /  AST  61     /  ALT  49     /  AlkPhos  80     09 Sep 2021 07:04    PT/INR - ( 09 Sep 2021 07:04 )   PT: 12.4 sec;   INR: 1.03 ratio      Occult Blood, Feces: Positive: Method: Peroxidase Catalyzation Activity (09.09.21 @ 10:14)       CAPILLARY BLOOD GLUCOSE  POCT Blood Glucose.: 159 mg/dL (09 Sep 2021 11:40)  POCT Blood Glucose.: 168 mg/dL (09 Sep 2021 05:27)  POCT Blood Glucose.: 224 mg/dL (08 Sep 2021 23:36)  POCT Blood Glucose.: 219 mg/dL (08 Sep 2021 17:14)  POCT Blood Glucose.: 218 mg/dL (08 Sep 2021 12:47)      RADIOLOGY & ADDITIONAL TESTS:    Imaging Personally Reviewed:  [ ] YES     Consultant(s) Notes Reviewed:      Care Discussed with Consultants/Other Providers:    Advanced Directives: [ ] DNR  [ ] No feeding tube  [ ] MOLST in chart  [ ] MOLST completed today  [ ] Unknown

## 2021-09-09 NOTE — PROGRESS NOTE ADULT - ASSESSMENT
74 y/o f trach dependent, with PEG tube, DM, HTN, GERD, CVA, dementia, constipation p/w being found unresponsive at [facility], brought in by EMS for post cardiac arrest care.  Noted to be febrile with leukocytosis and bandemia.    RECOMMENDATIONS    Sepsis - pt has vaughn, chronic vent support with trach, and nonverbal so not helping with localization.     rec continue ZOSYN (started 9/7) with recs to follow but high risk for PNA, anticipate 5 days so 9/11 as last day but will follow clinical course and sputum for any indication to adj duration     further recs to follow     anemia and electrolyte managment per primary [facility] physician    We will follow along in the care of this patient. Please call us at [phone] or text me directly on my cell# at [phone] with any concerns.

## 2021-09-09 NOTE — PROGRESS NOTE ADULT - SUBJECTIVE AND OBJECTIVE BOX
Date/Time Patient Seen:  		  Referring MD:   Data Reviewed	       Patient is a 75y old  Female who presents with a chief complaint of Unresponsive, post-cardiac arrest (08 Sep 2021 20:53)      Subjective/HPI     PAST MEDICAL & SURGICAL HISTORY:  Dementia of frontal lobe type    Aphasic stroke    Diabetes mellitus    Respiratory failure    Hypertension    GERD (gastroesophageal reflux disease)    Constipation    Respiratory failure    CVA (cerebral vascular accident)    HTN (hypertension)    DM (diabetes mellitus)    Advanced dementia    COVID-19 virus detected    Quadriplegia    Pneumonia    Type II diabetes mellitus    Hx of appendectomy    Gastrostomy in place    Tracheostomy in place    Tracheostomy tube present    Feeding by G-tube          Medication list         MEDICATIONS  (STANDING):  albuterol/ipratropium for Nebulization 3 milliLiter(s) Nebulizer every 6 hours  aspirin  chewable 81 milliGRAM(s) Enteral Tube daily  bisacodyl Suppository 10 milliGRAM(s) Rectal at bedtime  chlorhexidine 2% Cloths 1 Application(s) Topical daily  dextrose 40% Gel 15 Gram(s) Oral once  dextrose 5%. 1000 milliLiter(s) (50 mL/Hr) IV Continuous <Continuous>  dextrose 5%. 1000 milliLiter(s) (100 mL/Hr) IV Continuous <Continuous>  dextrose 50% Injectable 25 Gram(s) IV Push once  dextrose 50% Injectable 12.5 Gram(s) IV Push once  dextrose 50% Injectable 25 Gram(s) IV Push once  fentaNYL   Patch  12 MICROgram(s)/Hr 1 Patch Transdermal every 72 hours  fluconAZOLE   Tablet 100 milliGRAM(s) Oral daily  glucagon  Injectable 1 milliGRAM(s) IntraMuscular once  heparin   Injectable 5000 Unit(s) SubCutaneous every 8 hours  insulin glargine Injectable (LANTUS) 36 Unit(s) SubCutaneous at bedtime  insulin lispro (ADMELOG) corrective regimen sliding scale   SubCutaneous every 6 hours  lactated ringers. 1000 milliLiter(s) (75 mL/Hr) IV Continuous <Continuous>  lactobacillus acidophilus 2 Tablet(s) Oral daily  LORazepam   Injectable 0.5 milliGRAM(s) IV Push three times a day  norepinephrine Infusion 0.05 MICROgram(s)/kG/Min (2.77 mL/Hr) IV Continuous <Continuous>  pantoprazole  Injectable 40 milliGRAM(s) IV Push two times a day  piperacillin/tazobactam IVPB.. 3.375 Gram(s) IV Intermittent every 12 hours  polyethylene glycol 3350 17 Gram(s) Oral daily  senna 2 Tablet(s) Oral at bedtime  sucralfate suspension 1 Gram(s) Oral four times a day    MEDICATIONS  (PRN):  acetaminophen   Tablet .. 650 milliGRAM(s) Oral every 6 hours PRN Temp greater or equal to 38C (100.4F), Mild Pain (1 - 3)  sodium chloride 0.9% lock flush 10 milliLiter(s) IV Push every 1 hour PRN Pre/post blood products, medications, blood draw, and to maintain line patency         Vitals log        ICU Vital Signs Last 24 Hrs  T(C): 38.1 (09 Sep 2021 00:00), Max: 38.1 (09 Sep 2021 00:00)  T(F): 100.6 (09 Sep 2021 00:00), Max: 100.6 (09 Sep 2021 00:00)  HR: 70 (09 Sep 2021 06:53) (61 - 107)  BP: 94/56 (09 Sep 2021 05:30) (88/63 - 180/100)  BP(mean): 69 (09 Sep 2021 05:30) (57 - 122)  ABP: --  ABP(mean): --  RR: 11 (09 Sep 2021 05:30) (1 - 46)  SpO2: 100% (09 Sep 2021 06:53) (95% - 100%)       Mode: AC/ CMV (Assist Control/ Continuous Mandatory Ventilation)  RR (machine): 14  TV (machine): 400  FiO2: 40  PEEP: 5  ITime: 1  MAP: 11  PIP: 32      Input and Output:  I&O's Detail    07 Sep 2021 07:01  -  08 Sep 2021 07:00  --------------------------------------------------------  IN:    Enteral Tube Flush: 200 mL    Glucerna 1.5: 210 mL    IV PiggyBack: 350 mL    Lactated Ringers: 600 mL    Lactated Ringers: 1200 mL    Lactated Ringers Bolus: 1000 mL    Norepinephrine: 108.7 mL    sodium chloride 0.9%: 150 mL    Sodium Chloride 0.9% Bolus: 3000 mL  Total IN: 6818.7 mL    OUT:    Indwelling Catheter - Urethral (mL): 1600 mL    Voided (mL): 200 mL  Total OUT: 1800 mL    Total NET: 5018.7 mL      08 Sep 2021 07:01  -  09 Sep 2021 06:55  --------------------------------------------------------  IN:    Enteral Tube Flush: 300 mL    Glucerna 1.5: 590 mL    IV PiggyBack: 100 mL    Lactated Ringers: 1650 mL    Norepinephrine: 89.7 mL  Total IN: 2729.7 mL    OUT:    Indwelling Catheter - Urethral (mL): 1100 mL  Total OUT: 1100 mL    Total NET: 1629.7 mL          Lab Data                        7.7    7.65  )-----------( 289      ( 08 Sep 2021 09:03 )             25.9     09-08    141  |  102  |  59<H>  ----------------------------<  194<H>  3.1<L>   |  29  |  1.61<H>    Ca    7.9<L>      08 Sep 2021 06:55  Phos  3.8     09-08    TPro  6.2  /  Alb  2.2<L>  /  TBili  0.6  /  DBili  x   /  AST  118<H>  /  ALT  63<H>  /  AlkPhos  79  09-08      CARDIAC MARKERS ( 08 Sep 2021 06:55 )  .091 ng/mL / x     / x     / x     / x      CARDIAC MARKERS ( 07 Sep 2021 20:59 )  .123 ng/mL / x     / x     / x     / x      CARDIAC MARKERS ( 07 Sep 2021 10:38 )  .127 ng/mL / x     / x     / x     / x            Review of Systems	      Objective     Physical Examination    heart s1s2  lung dec BS      Pertinent Lab findings & Imaging      Annalise:  NO   Adequate UO     I&O's Detail    07 Sep 2021 07:01  -  08 Sep 2021 07:00  --------------------------------------------------------  IN:    Enteral Tube Flush: 200 mL    Glucerna 1.5: 210 mL    IV PiggyBack: 350 mL    Lactated Ringers: 600 mL    Lactated Ringers: 1200 mL    Lactated Ringers Bolus: 1000 mL    Norepinephrine: 108.7 mL    sodium chloride 0.9%: 150 mL    Sodium Chloride 0.9% Bolus: 3000 mL  Total IN: 6818.7 mL    OUT:    Indwelling Catheter - Urethral (mL): 1600 mL    Voided (mL): 200 mL  Total OUT: 1800 mL    Total NET: 5018.7 mL      08 Sep 2021 07:01  -  09 Sep 2021 06:55  --------------------------------------------------------  IN:    Enteral Tube Flush: 300 mL    Glucerna 1.5: 590 mL    IV PiggyBack: 100 mL    Lactated Ringers: 1650 mL    Norepinephrine: 89.7 mL  Total IN: 2729.7 mL    OUT:    Indwelling Catheter - Urethral (mL): 1100 mL  Total OUT: 1100 mL    Total NET: 1629.7 mL               Discussed with:     Cultures:	        Radiology

## 2021-09-09 NOTE — PROVIDER CONTACT NOTE (EICU) - ASSESSMENT
Possible seizure like activity. Prior to administration of ativan symptoms were improving. Tachycardia and tachypnea improved. Agreed with bedside team to give Ativan 1 mg IV. Awaiting call back from neurology.

## 2021-09-09 NOTE — PROGRESS NOTE ADULT - SUBJECTIVE AND OBJECTIVE BOX
ICU Progress Note    HPI:    S:    Pt seen and examined  HD #  Pt here for      Allergies    codeine (Hives)    Intolerances        MEDICATIONS  (STANDING):  albuterol/ipratropium for Nebulization 3 milliLiter(s) Nebulizer every 6 hours  aspirin  chewable 81 milliGRAM(s) Enteral Tube daily  bisacodyl Suppository 10 milliGRAM(s) Rectal at bedtime  chlorhexidine 0.12% Liquid 15 milliLiter(s) Oral Mucosa two times a day  chlorhexidine 2% Cloths 1 Application(s) Topical daily  dextrose 40% Gel 15 Gram(s) Oral once  dextrose 5%. 1000 milliLiter(s) (100 mL/Hr) IV Continuous <Continuous>  dextrose 5%. 1000 milliLiter(s) (50 mL/Hr) IV Continuous <Continuous>  dextrose 50% Injectable 25 Gram(s) IV Push once  dextrose 50% Injectable 12.5 Gram(s) IV Push once  dextrose 50% Injectable 25 Gram(s) IV Push once  fentaNYL   Patch  12 MICROgram(s)/Hr 1 Patch Transdermal every 72 hours  fluconAZOLE   Tablet 100 milliGRAM(s) Oral daily  glucagon  Injectable 1 milliGRAM(s) IntraMuscular once  insulin glargine Injectable (LANTUS) 36 Unit(s) SubCutaneous at bedtime  insulin lispro (ADMELOG) corrective regimen sliding scale   SubCutaneous every 6 hours  lactated ringers. 1000 milliLiter(s) (75 mL/Hr) IV Continuous <Continuous>  lactobacillus acidophilus 2 Tablet(s) Oral daily  LORazepam   Injectable 0.5 milliGRAM(s) IV Push three times a day  norepinephrine Infusion 0.05 MICROgram(s)/kG/Min (2.77 mL/Hr) IV Continuous <Continuous>  pantoprazole  Injectable 40 milliGRAM(s) IV Push two times a day  piperacillin/tazobactam IVPB.. 3.375 Gram(s) IV Intermittent every 12 hours  polyethylene glycol 3350 17 Gram(s) Oral daily  senna 2 Tablet(s) Oral at bedtime  simethicone 80 milliGRAM(s) Chew every 6 hours  sucralfate suspension 1 Gram(s) Oral four times a day    MEDICATIONS  (PRN):  acetaminophen   Tablet .. 650 milliGRAM(s) Oral every 6 hours PRN Temp greater or equal to 38C (100.4F), Mild Pain (1 - 3)  sodium chloride 0.9% lock flush 10 milliLiter(s) IV Push every 1 hour PRN Pre/post blood products, medications, blood draw, and to maintain line patency      Drug Dosing Weight  Height (cm): 162.6 (15 Zay 2021 01:21)  Weight (kg): 50 (07 Sep 2021 15:10)  BMI (kg/m2): 18.9 (07 Sep 2021 15:10)  BSA (m2): 1.52 (07 Sep 2021 15:10)    PAST MEDICAL & SURGICAL HISTORY:  Dementia of frontal lobe type    Aphasic stroke    Diabetes mellitus    Respiratory failure    Hypertension    GERD (gastroesophageal reflux disease)    Constipation    Respiratory failure    CVA (cerebral vascular accident)    HTN (hypertension)    DM (diabetes mellitus)    Advanced dementia    COVID-19 virus detected    Quadriplegia    Pneumonia    Type II diabetes mellitus    Hx of appendectomy    Gastrostomy in place    Tracheostomy in place    Tracheostomy tube present    Feeding by G-tube        FAMILY HISTORY:  No pertinent family history in first degree relatives        ROS: See HPI; otherwise, all systems reviewed and negative.    O:    ICU Vital Signs Last 24 Hrs  T(C): 37.7 (09 Sep 2021 20:57), Max: 38.1 (09 Sep 2021 00:00)  T(F): 99.8 (09 Sep 2021 20:57), Max: 100.6 (09 Sep 2021 00:00)  HR: 79 (09 Sep 2021 20:00) (63 - 135)  BP: 118/63 (09 Sep 2021 20:00) (94/56 - 196/48)  BP(mean): 81 (09 Sep 2021 20:00) (62 - 90)  ABP: --  ABP(mean): --  RR: 18 (09 Sep 2021 20:00) (1 - 50)  SpO2: 100% (09 Sep 2021 20:00) (96% - 100%)          I&O's Detail    08 Sep 2021 07:01  -  09 Sep 2021 07:00  --------------------------------------------------------  IN:    Enteral Tube Flush: 300 mL    Glucerna 1.5: 590 mL    IV PiggyBack: 200 mL    Lactated Ringers: 1800 mL    Norepinephrine: 89.7 mL  Total IN: 9129.7 mL    OUT:    Indwelling Catheter - Urethral (mL): 1100 mL  Total OUT: 1100 mL    Total NET: 1879.7 mL      09 Sep 2021 07:01  -  09 Sep 2021 22:42  --------------------------------------------------------  IN:    Enteral Tube Flush: 125 mL    IV PiggyBack: 650 mL    Lactated Ringers: 675 mL    TwoCal HN: 100 mL  Total IN: 1550 mL    OUT:    Indwelling Catheter - Urethral (mL): 600 mL    Norepinephrine: 0 mL  Total OUT: 600 mL    Total NET: 950 mL          Mode: AC/ CMV (Assist Control/ Continuous Mandatory Ventilation)  RR (machine): 14  TV (machine): 400  FiO2: 30  PEEP: 5  ITime: 1  MAP: 12  PIP: 32      PE:    Constitutional: Healthy M lying in bed in NAD.   Neck: No JVD, trachea midline.   Respiratory: CTA B/L good BS B/L no W/R/R.  Cardiovascular: S1S2+ RRR no M/R/G.  Gastrointestinal: Soft, NTND.  Extremities: No peripheral edema, No cyanosis, clubbing.  Neurological: Awake, conversive, alert, no gross deficits.  Skin: No rashes, warm, moist.    LABS:    CBC Full  -  ( 09 Sep 2021 15:06 )  WBC Count : 7.69 K/uL  RBC Count : 2.73 M/uL  Hemoglobin : 7.3 g/dL  Hematocrit : 24.5 %  Platelet Count - Automated : 222 K/uL  Mean Cell Volume : 89.7 fl  Mean Cell Hemoglobin : 26.7 pg  Mean Cell Hemoglobin Concentration : 29.8 gm/dL  Auto Neutrophil # : x  Auto Lymphocyte # : x  Auto Monocyte # : x  Auto Eosinophil # : x  Auto Basophil # : x  Auto Neutrophil % : x  Auto Lymphocyte % : x  Auto Monocyte % : x  Auto Eosinophil % : x  Auto Basophil % : x    09-09    142  |  107  |  37<H>  ----------------------------<  152<H>  3.1<L>   |  29  |  1.24    Ca    8.1<L>      09 Sep 2021 07:04  Phos  2.0     09-09  Mg     2.5     09-09    TPro  6.0  /  Alb  2.0<L>  /  TBili  0.3  /  DBili  x   /  AST  61<H>  /  ALT  49  /  AlkPhos  80  09-09    PT/INR - ( 09 Sep 2021 07:04 )   PT: 12.4 sec;   INR: 1.03 ratio             CAPILLARY BLOOD GLUCOSE      POCT Blood Glucose.: 122 mg/dL (09 Sep 2021 21:36)  POCT Blood Glucose.: 156 mg/dL (09 Sep 2021 18:09)  POCT Blood Glucose.: 159 mg/dL (09 Sep 2021 11:40)  POCT Blood Glucose.: 168 mg/dL (09 Sep 2021 05:27)  POCT Blood Glucose.: 224 mg/dL (08 Sep 2021 23:36)    CARDIAC MARKERS ( 09 Sep 2021 07:04 )  .035 ng/mL / x     / x     / x     / x      CARDIAC MARKERS ( 08 Sep 2021 06:55 )  .091 ng/mL / x     / x     / x     / x          LIVER FUNCTIONS - ( 09 Sep 2021 07:04 )  Alb: 2.0 g/dL / Pro: 6.0 g/dL / ALK PHOS: 80 U/L / ALT: 49 U/L DA / AST: 61 U/L / GGT: x               A:    75yFemale  HD #    Here for:    1.    P:    Neuro: GCS 15. Monitor for delirium.  Continue to optimize pain control. Serial Neurologic assessments.    HEENT: No issues.    CV: Continue hemodynamic monitoring    Pulm: Pulmonary toilet.  Continue incentive spirometer.  Chest PT.  Encourage OOB to chair and ambulation. Nebs. f/u ABG, CXR.    GI/Nutrition: Cont diet, bowel regimen.    /Renal: Monitor UOP. Monitor BMP.  Replete Lytes as needed.    HEME- Chemical and mechanical DVT ppx. f/u CBC. f/u coags as needed.    ID:  Cont abx. f/u Cx's.    Lines/Tubes:     Endo: Maintain euglycemia.    Skin:  Cont skin care, pressure ulcer prevention.    Dispo: Cont care.       ICU Progress Note    S: s/p cardiac arrest. 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     Allergies    codeine (Hives)    Intolerances        MEDICATIONS  (STANDING):  albuterol/ipratropium for Nebulization 3 milliLiter(s) Nebulizer every 6 hours  aspirin  chewable 81 milliGRAM(s) Enteral Tube daily  bisacodyl Suppository 10 milliGRAM(s) Rectal at bedtime  chlorhexidine 0.12% Liquid 15 milliLiter(s) Oral Mucosa two times a day  chlorhexidine 2% Cloths 1 Application(s) Topical daily  dextrose 40% Gel 15 Gram(s) Oral once  dextrose 5%. 1000 milliLiter(s) (100 mL/Hr) IV Continuous <Continuous>  dextrose 5%. 1000 milliLiter(s) (50 mL/Hr) IV Continuous <Continuous>  dextrose 50% Injectable 25 Gram(s) IV Push once  dextrose 50% Injectable 12.5 Gram(s) IV Push once  dextrose 50% Injectable 25 Gram(s) IV Push once  fentaNYL   Patch  12 MICROgram(s)/Hr 1 Patch Transdermal every 72 hours  fluconAZOLE   Tablet 100 milliGRAM(s) Oral daily  glucagon  Injectable 1 milliGRAM(s) IntraMuscular once  insulin glargine Injectable (LANTUS) 36 Unit(s) SubCutaneous at bedtime  insulin lispro (ADMELOG) corrective regimen sliding scale   SubCutaneous every 6 hours  lactated ringers. 1000 milliLiter(s) (75 mL/Hr) IV Continuous <Continuous>  lactobacillus acidophilus 2 Tablet(s) Oral daily  LORazepam   Injectable 0.5 milliGRAM(s) IV Push three times a day  norepinephrine Infusion 0.05 MICROgram(s)/kG/Min (2.77 mL/Hr) IV Continuous <Continuous>  pantoprazole  Injectable 40 milliGRAM(s) IV Push two times a day  piperacillin/tazobactam IVPB.. 3.375 Gram(s) IV Intermittent every 12 hours  polyethylene glycol 3350 17 Gram(s) Oral daily  senna 2 Tablet(s) Oral at bedtime  simethicone 80 milliGRAM(s) Chew every 6 hours  sucralfate suspension 1 Gram(s) Oral four times a day    MEDICATIONS  (PRN):  acetaminophen   Tablet .. 650 milliGRAM(s) Oral every 6 hours PRN Temp greater or equal to 38C (100.4F), Mild Pain (1 - 3)  sodium chloride 0.9% lock flush 10 milliLiter(s) IV Push every 1 hour PRN Pre/post blood products, medications, blood draw, and to maintain line patency      Drug Dosing Weight  Height (cm): 162.6 (15 Zay 2021 01:21)  Weight (kg): 50 (07 Sep 2021 15:10)  BMI (kg/m2): 18.9 (07 Sep 2021 15:10)  BSA (m2): 1.52 (07 Sep 2021 15:10)    PAST MEDICAL & SURGICAL HISTORY:  Dementia of frontal lobe type    Aphasic stroke    Diabetes mellitus    Respiratory failure    Hypertension    GERD (gastroesophageal reflux disease)    Constipation    Respiratory failure    CVA (cerebral vascular accident)    HTN (hypertension)    DM (diabetes mellitus)    Advanced dementia    COVID-19 virus detected    Quadriplegia    Pneumonia    Type II diabetes mellitus    Hx of appendectomy    Gastrostomy in place    Tracheostomy in place    Tracheostomy tube present    Feeding by G-tube        FAMILY HISTORY:  No pertinent family history in first degree relatives        ROS: See HPI; otherwise, all systems reviewed and negative.    O:    ICU Vital Signs Last 24 Hrs  T(C): 37.7 (09 Sep 2021 20:57), Max: 38.1 (09 Sep 2021 00:00)  T(F): 99.8 (09 Sep 2021 20:57), Max: 100.6 (09 Sep 2021 00:00)  HR: 79 (09 Sep 2021 20:00) (63 - 135)  BP: 118/63 (09 Sep 2021 20:00) (94/56 - 196/48)  BP(mean): 81 (09 Sep 2021 20:00) (62 - 90)  ABP: --  ABP(mean): --  RR: 18 (09 Sep 2021 20:00) (1 - 50)  SpO2: 100% (09 Sep 2021 20:00) (96% - 100%)          I&O's Detail    08 Sep 2021 07:01  -  09 Sep 2021 07:00  --------------------------------------------------------  IN:    Enteral Tube Flush: 300 mL    Glucerna 1.5: 590 mL    IV PiggyBack: 200 mL    Lactated Ringers: 1800 mL    Norepinephrine: 89.7 mL  Total IN: 2979.7 mL    OUT:    Indwelling Catheter - Urethral (mL): 1100 mL  Total OUT: 1100 mL    Total NET: 1879.7 mL      09 Sep 2021 07:01  -  09 Sep 2021 22:42  --------------------------------------------------------  IN:    Enteral Tube Flush: 125 mL    IV PiggyBack: 650 mL    Lactated Ringers: 675 mL    TwoCal HN: 100 mL  Total IN: 1550 mL    OUT:    Indwelling Catheter - Urethral (mL): 600 mL    Norepinephrine: 0 mL  Total OUT: 600 mL    Total NET: 950 mL          Mode: AC/ CMV (Assist Control/ Continuous Mandatory Ventilation)  RR (machine): 14  TV (machine): 400  FiO2: 30  PEEP: 5  ITime: 1  MAP: 12  PIP: 32      PE:    Constitutional: NAD   Neck: + Trach  Respiratory: CTA B/L good BS B/L no W/R/R.  Cardiovascular: S1S2+ RRR no M/R/G.  Gastrointestinal: Soft, NTND.  Extremities: No peripheral edema, No cyanosis, clubbing.  Neurological: Obtunded.  Skin: No rashes, warm, moist.    LABS:    CBC Full  -  ( 09 Sep 2021 15:06 )  WBC Count : 7.69 K/uL  RBC Count : 2.73 M/uL  Hemoglobin : 7.3 g/dL  Hematocrit : 24.5 %  Platelet Count - Automated : 222 K/uL  Mean Cell Volume : 89.7 fl  Mean Cell Hemoglobin : 26.7 pg  Mean Cell Hemoglobin Concentration : 29.8 gm/dL  Auto Neutrophil # : x  Auto Lymphocyte # : x  Auto Monocyte # : x  Auto Eosinophil # : x  Auto Basophil # : x  Auto Neutrophil % : x  Auto Lymphocyte % : x  Auto Monocyte % : x  Auto Eosinophil % : x  Auto Basophil % : x    09-09    142  |  107  |  37<H>  ----------------------------<  152<H>  3.1<L>   |  29  |  1.24    Ca    8.1<L>      09 Sep 2021 07:04  Phos  2.0     09-09  Mg     2.5     09-09    TPro  6.0  /  Alb  2.0<L>  /  TBili  0.3  /  DBili  x   /  AST  61<H>  /  ALT  49  /  AlkPhos  80  09-09    PT/INR - ( 09 Sep 2021 07:04 )   PT: 12.4 sec;   INR: 1.03 ratio             CAPILLARY BLOOD GLUCOSE      POCT Blood Glucose.: 122 mg/dL (09 Sep 2021 21:36)  POCT Blood Glucose.: 156 mg/dL (09 Sep 2021 18:09)  POCT Blood Glucose.: 159 mg/dL (09 Sep 2021 11:40)  POCT Blood Glucose.: 168 mg/dL (09 Sep 2021 05:27)  POCT Blood Glucose.: 224 mg/dL (08 Sep 2021 23:36)    CARDIAC MARKERS ( 09 Sep 2021 07:04 )  .035 ng/mL / x     / x     / x     / x      CARDIAC MARKERS ( 08 Sep 2021 06:55 )  .091 ng/mL / x     / x     / x     / x          LIVER FUNCTIONS - ( 09 Sep 2021 07:04 )  Alb: 2.0 g/dL / Pro: 6.0 g/dL / ALK PHOS: 80 U/L / ALT: 49 U/L DA / AST: 61 U/L / GGT: x               A:    75yFemale  HD #    Here for:    1.    P:    Cont post cardiac arrest care  depakote loaded for anoxic movements  Norepi prn shock  Cont vent support. +trach in place  PPI  ISS  DVT ppx  Zosyn for UTI

## 2021-09-09 NOTE — PROGRESS NOTE ADULT - ASSESSMENT
The patient is a 75 year old female with a history of HTN, DM, CVA, dementia, chronic respiratory failure s/p trach, PEG who is admitted after cardiac arrest.     Plan:  - ECG with RBBB and nonspecific findings  - Troponin mildly elevated at 0.127 in the setting of demand ischemia from cardiac arrest, septic shock  - Echo with normal LV systolic function, mild pulm HTN  - Off of pressors  - Continue aspirin 81 mg daily  - Monitor hemoglobin - transfuse if continues to trend down  - On zosyn  - Mechanical ventilation  - Overall prognosis very poor

## 2021-09-09 NOTE — PROGRESS NOTE ADULT - ASSESSMENT
76 yo f pmhx dementia, CVA, VDRF s/p trach/peg, quadriplegia, DM, HTN, GERD, constipation admitted with   s/p card arrest - sepsis - shock - acute infection - dementia - chr resp failure    GOC discussion  pt is full code   is the HCP  on TF  on NEBS  on ABX rx regimen  assist with needs - ADL  HOB elev - orgal hygiene - skin care  prognosis guarded to poor

## 2021-09-09 NOTE — PROGRESS NOTE ADULT - SUBJECTIVE AND OBJECTIVE BOX
Chief Complaint: Cardiac arrest    Interval Events: No significant changes overnight.    Review of Systems:  Unable to obtain    Physical Exam:  Vitals:        Vital Signs Last 24 Hrs  T(C): 37.7 (09 Sep 2021 08:30), Max: 38.1 (09 Sep 2021 00:00)  T(F): 99.8 (09 Sep 2021 08:30), Max: 100.6 (09 Sep 2021 00:00)  HR: 65 (09 Sep 2021 09:00) (62 - 107)  BP: 99/49 (09 Sep 2021 09:00) (88/63 - 180/100)  BP(mean): 65 (09 Sep 2021 09:00) (57 - 122)  RR: 13 (09 Sep 2021 09:00) (1 - 46)  SpO2: 100% (09 Sep 2021 09:00) (95% - 100%)  General: NAD  HEENT: Trach  Neck: No JVD, no carotid bruit  Lungs: CTAB  CV: RRR, nl S1/S2, no M/R/G  Abdomen: S/NT/ND, +BS  Extremities: No LE edema, no cyanosis  Neuro: AAOx0, contracted  Skin: No rash    Labs:                        7.0    4.84  )-----------( 226      ( 09 Sep 2021 07:04 )             24.0     09-09    142  |  107  |  37<H>  ----------------------------<  152<H>  3.1<L>   |  29  |  1.24    Ca    8.1<L>      09 Sep 2021 07:04  Phos  2.0     09-09    TPro  6.0  /  Alb  2.0<L>  /  TBili  0.3  /  DBili  x   /  AST  61<H>  /  ALT  49  /  AlkPhos  80  09-09    CARDIAC MARKERS ( 09 Sep 2021 07:04 )  .035 ng/mL / x     / x     / x     / x      CARDIAC MARKERS ( 08 Sep 2021 06:55 )  .091 ng/mL / x     / x     / x     / x      CARDIAC MARKERS ( 07 Sep 2021 20:59 )  .123 ng/mL / x     / x     / x     / x      CARDIAC MARKERS ( 07 Sep 2021 10:38 )  .127 ng/mL / x     / x     / x     / x          PT/INR - ( 09 Sep 2021 07:04 )   PT: 12.4 sec;   INR: 1.03 ratio         PTT - ( 07 Sep 2021 10:38 )  PTT:41.4 sec    Telemetry: Sinus rhythm

## 2021-09-09 NOTE — PROGRESS NOTE ADULT - ASSESSMENT
74 y/o f trach dependent, with PEG tube, DM, HTN, GERD, CVA, dementia, constipation p/w being found unresponsive at [facility], brought in by EMS for post cardiac arrest care, found to have sepsis, b/l aspiration PNA, and mild colonic ileus 75F Chronic respiratory failure trach/vent dependent, with PEG tube, functional quadriplegia/total care patient, DM2, HTN, GERD, CVA, dementia, constipation p/w being found unresponsive at [facility], brought in by EMS for post cardiac arrest care, found to have sepsis, b/l aspiration PNA, and mild colonic ileus

## 2021-09-09 NOTE — PROGRESS NOTE ADULT - SUBJECTIVE AND OBJECTIVE BOX
[facility] BECKHAM    [facility]    Patient is a 75y old  Female who presents with a chief complaint of Unresponsive, post-cardiac arrest (09 Sep 2021 06:55)       Allergies    codeine (Hives)    Intolerances        HPI:  76 y/o f trach dependent, with PEG tube, DM, HTN, GERD, CVA, dementia, constipation p/w being found unresponsive at [facility], brought in by EMS for post cardiac arrest care.       In ED, pt was given IVF NS bolus, was foudn to be hypotensive, and started on levophed for pressor support, and CT showed signs of aspiration PNA, an dpt was started on IV zosyn.     (07 Sep 2021 12:19)      PAST MEDICAL & SURGICAL HISTORY:  Dementia of frontal lobe type    Aphasic stroke    Diabetes mellitus    Respiratory failure    Hypertension    GERD (gastroesophageal reflux disease)    Constipation    Respiratory failure    CVA (cerebral vascular accident)    HTN (hypertension)    DM (diabetes mellitus)    Advanced dementia    COVID-19 virus detected    Quadriplegia    Pneumonia    Type II diabetes mellitus    Hx of appendectomy    Gastrostomy in place    Tracheostomy in place    Tracheostomy tube present    Feeding by G-tube        FAMILY HISTORY:  No pertinent family history in first degree relatives          MEDICATIONS   acetaminophen   Tablet .. 650 milliGRAM(s) Oral every 6 hours PRN  albuterol/ipratropium for Nebulization 3 milliLiter(s) Nebulizer every 6 hours  aspirin  chewable 81 milliGRAM(s) Enteral Tube daily  bisacodyl Suppository 10 milliGRAM(s) Rectal at bedtime  chlorhexidine 2% Cloths 1 Application(s) Topical daily  dextrose 40% Gel 15 Gram(s) Oral once  dextrose 5%. 1000 milliLiter(s) IV Continuous <Continuous>  dextrose 5%. 1000 milliLiter(s) IV Continuous <Continuous>  dextrose 50% Injectable 25 Gram(s) IV Push once  dextrose 50% Injectable 12.5 Gram(s) IV Push once  dextrose 50% Injectable 25 Gram(s) IV Push once  fentaNYL   Patch  12 MICROgram(s)/Hr 1 Patch Transdermal every 72 hours  fluconAZOLE   Tablet 100 milliGRAM(s) Oral daily  glucagon  Injectable 1 milliGRAM(s) IntraMuscular once  heparin   Injectable 5000 Unit(s) SubCutaneous every 8 hours  insulin glargine Injectable (LANTUS) 36 Unit(s) SubCutaneous at bedtime  insulin lispro (ADMELOG) corrective regimen sliding scale   SubCutaneous every 6 hours  lactated ringers. 1000 milliLiter(s) IV Continuous <Continuous>  lactobacillus acidophilus 2 Tablet(s) Oral daily  LORazepam   Injectable 0.5 milliGRAM(s) IV Push three times a day  norepinephrine Infusion 0.05 MICROgram(s)/kG/Min IV Continuous <Continuous>  pantoprazole  Injectable 40 milliGRAM(s) IV Push two times a day  piperacillin/tazobactam IVPB.. 3.375 Gram(s) IV Intermittent every 12 hours  polyethylene glycol 3350 17 Gram(s) Oral daily  potassium chloride  20 mEq/100 mL IVPB 20 milliEquivalent(s) IV Intermittent every 2 hours  potassium phosphate IVPB 15 milliMole(s) IV Intermittent once  senna 2 Tablet(s) Oral at bedtime  sodium chloride 0.9% lock flush 10 milliLiter(s) IV Push every 1 hour PRN  sucralfate suspension 1 Gram(s) Oral four times a day      Vital Signs Last 24 Hrs  T(C): 37.7 (09 Sep 2021 06:00), Max: 38.1 (09 Sep 2021 00:00)  T(F): 99.9 (09 Sep 2021 06:00), Max: 100.6 (09 Sep 2021 00:00)  HR: 69 (09 Sep 2021 07:00) (62 - 107)  BP: 117/53 (09 Sep 2021 07:00) (88/63 - 180/100)  BP(mean): 70 (09 Sep 2021 07:00) (57 - 122)  RR: 20 (09 Sep 2021 07:00) (1 - 46)  SpO2: 100% (09 Sep 2021 07:00) (95% - 100%)      21 @ 07:01  -  21 @ 07:00  --------------------------------------------------------  IN: 2979.7 mL / OUT: 1100 mL / NET: 1879.7 mL        Mode: AC/ CMV (Assist Control/ Continuous Mandatory Ventilation), RR (machine): 14, TV (machine): 400, FiO2: 40, PEEP: 5, ITime: 1, MAP: 11, PIP: 32    LABS:                        7.0    4.84  )-----------( 226      ( 09 Sep 2021 07:04 )             24.0         142  |  107  |  37<H>  ----------------------------<  152<H>  3.1<L>   |  29  |  1.24    Ca    8.1<L>      09 Sep 2021 07:04  Phos  2.0         TPro  6.0  /  Alb  2.0<L>  /  TBili  0.3  /  DBili  x   /  AST  61<H>  /  ALT  49  /  AlkPhos  80      PT/INR - ( 09 Sep 2021 07:04 )   PT: 12.4 sec;   INR: 1.03 ratio         PTT - ( 07 Sep 2021 10:38 )  PTT:41.4 sec  Urinalysis Basic - ( 07 Sep 2021 10:41 )    Color: Yellow / Appearance: Turbid / S.010 / pH: x  Gluc: x / Ketone: Negative  / Bili: Negative / Urobili: Negative mg/dL   Blood: x / Protein: 500 mg/dL / Nitrite: Negative   Leuk Esterase: Moderate / RBC: 3-5 /HPF / WBC 26-50   Sq Epi: x / Non Sq Epi: Few / Bacteria: TNTC            WBC:  WBC Count: 4.84 K/uL ( @ 07:04)  WBC Count: 7.65 K/uL ( @ 09:03)  WBC Count: 8.44 K/uL ( @ 06:55)  WBC Count: 26.45 K/uL ( @ 10:38)      MICROBIOLOGY:  RECENT CULTURES:   .Sputum Sputum XXXX   Moderate polymorphonuclear leukocytes per low power field  Few Squamous epithelial cells per low power field  Moderate Gram Positive Rods per oil power field  Moderate Gram Negative Rods per oil power field  Few Yeast like cells per oil power field  Moderate Gram Negative Diplococci per oil power field XXXX     .Blood Blood-Peripheral Proteus mirabilis XXXX   No growth to date.          CARDIAC MARKERS ( 09 Sep 2021 07:04 )  .035 ng/mL / x     / x     / x     / x      CARDIAC MARKERS ( 08 Sep 2021 06:55 )  .091 ng/mL / x     / x     / x     / x      CARDIAC MARKERS ( 07 Sep 2021 20:59 )  .123 ng/mL / x     / x     / x     / x      CARDIAC MARKERS ( 07 Sep 2021 10:38 )  .127 ng/mL / x     / x     / x     / x            PT/INR - ( 09 Sep 2021 07:04 )   PT: 12.4 sec;   INR: 1.03 ratio         PTT - ( 07 Sep 2021 10:38 )  PTT:41.4 sec    Sodium:  Sodium, Serum: 142 mmol/L ( @ 07:04)  Sodium, Serum: 141 mmol/L ( @ 06:55)  Sodium, Serum: 140 mmol/L ( @ 10:56)      1.24 mg/dL  07:04  1.61 mg/dL  @ 06:55  2.58 mg/dL  @ 10:56      Hemoglobin:  Hemoglobin: 7.0 g/dL ( @ 07:04)  Hemoglobin: 7.7 g/dL ( @ 09:03)  Hemoglobin: 7.6 g/dL ( @ 06:55)  Hemoglobin: 11.6 g/dL ( @ 10:38)      Platelets: Platelet Count - Automated: 226 K/uL ( @ 07:04)  Platelet Count - Automated: 289 K/uL ( @ 09:03)  Platelet Count - Automated: 284 K/uL ( @ 06:55)  Platelet Count - Automated: 362 K/uL ( @ 10:38)      LIVER FUNCTIONS - ( 09 Sep 2021 07:04 )  Alb: 2.0 g/dL / Pro: 6.0 g/dL / ALK PHOS: 80 U/L / ALT: 49 U/L DA / AST: 61 U/L / GGT: x             Urinalysis Basic - ( 07 Sep 2021 10:41 )    Color: Yellow / Appearance: Turbid / S.010 / pH: x  Gluc: x / Ketone: Negative  / Bili: Negative / Urobili: Negative mg/dL   Blood: x / Protein: 500 mg/dL / Nitrite: Negative   Leuk Esterase: Moderate / RBC: 3-5 /HPF / WBC 26-50   Sq Epi: x / Non Sq Epi: Few / Bacteria: TNTC        RADIOLOGY & ADDITIONAL STUDIES:

## 2021-09-09 NOTE — PROGRESS NOTE ADULT - ASSESSMENT
CHAPINCITO GUIDRY 75 f [facility] P 9/7/2021   DR ILIANA KEMP     REVIEW OF SYMPTOMS      Able to give (reliable) ROS  NO     PHYSICAL EXAM    HEENT Unremarkable  atraumatic   RESP Fair air entry EXP prolonged    Harsh breath sound Resp distres mild   CARDIAC S1 S2 No S3     NO JVD    ABDOMEN SOFT BS PRESENT NOT DISTENDED No hepatosplenomegaly   PEDAL EDEMA present No calf tenderness  NO rash        9/7/2021 PATIENT PRESENTATION.  74F PMH Frontotemporal dementia, CVA, chronic vent dependent respiratory failure, dysphagia s/p PEG, recurrent UTI/VAP Past HO sputum Pseudomonas resistant zosyn presents sp cac at [facility] nursing facility on 9/7/2021   In syo er on 9/7/2021 already given 4l ns   Pulm critical care consulted 9/7/2021     RECENT HOSPITAL STAYS   6/15-6/23 [facility] p    6/19/2021 fungitell 44  6/19 caspofungin  10/30-11/6 [facility] P      Home meds poa included meropenem bd duragesic 12 lorazepam 1.3 asa 81 lovnx 40 lantus 40 hs       BEST PRACTICE ISSUES.                                                  HEAD OF BED ELEVATION. Yes  DVT PROPHYLAXIS.  lvnx 30 (9/7/2021) -> hpsc (9/7) -> dced 9/8 drop in hb                                         SQUIRES PROPHYLAXIS.   protonix 40 (9/7/2021)       carafate 1.4 (9/7/2021)                                                                                   DIET.     npo 9/7/2021  -> glucerna 1.5 1200 (9/7)   -> 2cal 800 (9/9)                   INFECTION PROPHYLAXIS.           chlorhexidine 2% (9/8)            chlorhexidine .12 9/9/2021     GENERAL ISSUES  GOC ADVANCED DIRECTIVE.                                         ALLERGY.                            WEIGHT.         9/7/2021 130                           BMI.                   9/7/2021  22    PATIENT DATA   TUBES  PROCEDURES.      9/7/2021 Attempted by er md andrews and r subclav unsuccessful   9/7/2021 IO   9/7/2021 vaughn  9/8/2021 C line heathj  ccpa   9/9/2021 vaughn dced     PEG poa  TRACH poa     ABG.       OXYGENATION.                   VITALS/IO/VENT/DRIPS.     9/9/2021 afeb 78 120/60   9/9/2021 ac 14/400/5/40    PROBLEM ASSESSMENT/RECOMMENDATIONS.    COVID PROFILE.  scv2 9/7/2021 (-)  No current covid infection    SP CAC AT HCF FEW MIN 9/7/2021.   Opens eyes     POSSIBLE ASP PNEUMONIA poa.  w 9/7-9/8-9/9/2021 w 26 -8.4 - 7   b 9/7/2021 b 10   ua 9/7/2021 w 26-50 bact tntc   ct cap 9/7/2021   basl aspn pneum and atelect  fluid gas distn of thoracic esophagus suggesting gerd  mild colon ileus   marked fluid distention rectunm  cxr 9/9/2021 r infiltr  rvp 9/7/2021 (-)  mrsa 9/8/2021 (-)   Diflucan 100 x 5d (9/7/2021)   zosyn x 7d (9/7/2021) (Dr Mcpherson) (Pneu)        SHOCK poa.   lr 150 x 8 h then lr  75 (9/7/2021)   norepi 9/7/2021 12p  norepi dced 9/9/2021   C line placed 9/8/2021  target map 65 (+)    LACTICEMIA  poa.  la 9/7-9/7-9/8/2021 la 13.5-3.7- 1     TRACH POA.     VENT DEPENENT POA.   Target pH 730 (+) PO2 60 (+) po 90-95% Pplat 35 (-)   HOBE DSV DSBT  9/8/2021 No abg available resp could not get abg     COPD.  duoneb (9/7/2021)       RO MI.   Tr 9/7-9/8/2021 tr .17- .091   ASA 81 (9/7/2021)   metoprolol 25.2 (9/7/2021)     RO CHF.   bnp 9/7/2021 bnp 2391   echo 6/15/2021 n lvsf dd1     ANEMIA   Hb 9/7-9/8-9/9 11.6-7.6- 7.3   gi on case   Drop likely sec to hydration Need to exclude abla       PEG POA.    GERD.  9/7/2021 ct cap gerd   protonix 40  9/7/2021)                                             ELEVATED LFTS POA.  LFTS 9/7/2021  ap 122  ast 205  alt 81                                        COLON ILEUS ON CTAP 9/7/2021.   bisacodyl 10 (9/7/2021)   miralax (9/7/2021)     RYAN poa.  Cr 9/7-9/8-9/9/2021 Cr 2.5- 1.6- 1.2   lr 150 x 8 h then ns 75 (9/7/2021)     DM.   insulin     PMH APHASIC STROKE.   AC R SPHENOID SINUSITIS 9/7/2021 CT.   ct head 9/7/2021 No ac poss ac r sphenoid sinusitis    PAIN.   fentnyl 12 (9/7/2021)    ANXIETY.   lorazepam 1.3 (9/7/2021)     SEIZURES 9/9/2021   Given lorazepam 4 on 9/9/2021   Neuro called 9/9/2021             OVERALL PLAN.  SP CAC C monitor C enz Cardio on case    VENT Vent bndle  ASP PNEUM bsab  RYAN Hydration monitor Improvd   SHOCK  target pap 65 (+)  DROP IN HB 9/8/2021 Likely sec fluids gi on case no ongoing gi bl suspectd no egd plannd    SZ on anticonvulsants (9/9/2021)       TIME SPENT   Over 36 minutes aggregate critical care time spent on encounter; activities included   direct patient care, counseling and/or coordinating care reviewing notes, lab data/ imaging , discussion with multidisciplinary team/ patient  /family and explaining in detail risks, benefits, alternatives  of the recommendations     CHAPINCITO GUIDRY 75 f [facility] P 9/7/2021   DR ILIANA KEMP

## 2021-09-09 NOTE — PROVIDER CONTACT NOTE (EICU) - BACKGROUND
Gris is from [facility], has a chronic trach/peg, and was found unresponsive (per notes) and in cardiac arrest. ROSC achieved in route to ED. Upon arrival concerns for pulmonary infection with septic shock (local protocols initiated). PMH- Aphasic Stroke, frontal lobe dementia, DM, HTN, h/o UTI with candida and proteus.
75 year old female with CVA and dementia s/p tracheostomy brought in following cardiac arrest at facility.

## 2021-09-09 NOTE — PROGRESS NOTE ADULT - SUBJECTIVE AND OBJECTIVE BOX
[facility] DIVISION of INFECTIOUS DISEASE  Arturo Olivas MD PhD, Haylee Umanzor MD, Andressa Brantley MD, Billy Valenzuela MD  and providing coverage with Alexa Canas MD and Eusebio Chairez MD  Providing Infectious Disease Consultations at [facility], [facility], [facility]'s    Office# [phone] to schedule follow up appointments  Answering Service for urgent calls or New Consults [phone]  Cell# to text for urgent issues Arturo Olivas [phone]     infectious diseases progress note:    BREA BECKHAM is a 75y y. o. Female patient    Patient with no concerning overnight events, remains nonverbal    Allergies    codeine (Hives)    Intolerances        ANTIBIOTICS/RELEVANT:  antimicrobials  fluconAZOLE   Tablet 100 milliGRAM(s) Oral daily  piperacillin/tazobactam IVPB.. 3.375 Gram(s) IV Intermittent every 12 hours    immunologic:    OTHER:  acetaminophen   Tablet .. 650 milliGRAM(s) Oral every 6 hours PRN  albuterol/ipratropium for Nebulization 3 milliLiter(s) Nebulizer every 6 hours  aspirin  chewable 81 milliGRAM(s) Enteral Tube daily  bisacodyl Suppository 10 milliGRAM(s) Rectal at bedtime  chlorhexidine 2% Cloths 1 Application(s) Topical daily  dextrose 40% Gel 15 Gram(s) Oral once  dextrose 5%. 1000 milliLiter(s) IV Continuous <Continuous>  dextrose 5%. 1000 milliLiter(s) IV Continuous <Continuous>  dextrose 50% Injectable 25 Gram(s) IV Push once  dextrose 50% Injectable 12.5 Gram(s) IV Push once  dextrose 50% Injectable 25 Gram(s) IV Push once  fentaNYL   Patch  12 MICROgram(s)/Hr 1 Patch Transdermal every 72 hours  glucagon  Injectable 1 milliGRAM(s) IntraMuscular once  heparin   Injectable 5000 Unit(s) SubCutaneous every 8 hours  insulin glargine Injectable (LANTUS) 36 Unit(s) SubCutaneous at bedtime  insulin lispro (ADMELOG) corrective regimen sliding scale   SubCutaneous every 6 hours  lactated ringers. 1000 milliLiter(s) IV Continuous <Continuous>  lactobacillus acidophilus 2 Tablet(s) Oral daily  LORazepam   Injectable 0.5 milliGRAM(s) IV Push three times a day  norepinephrine Infusion 0.05 MICROgram(s)/kG/Min IV Continuous <Continuous>  pantoprazole  Injectable 40 milliGRAM(s) IV Push two times a day  polyethylene glycol 3350 17 Gram(s) Oral daily  potassium chloride  20 mEq/100 mL IVPB 20 milliEquivalent(s) IV Intermittent every 2 hours  senna 2 Tablet(s) Oral at bedtime  sodium chloride 0.9% lock flush 10 milliLiter(s) IV Push every 1 hour PRN  sucralfate suspension 1 Gram(s) Oral four times a day      Objective:  Last 24-Vital Signs Last 24 Hrs  T(C): 37.7 (09 Sep 2021 08:30), Max: 38.1 (09 Sep 2021 00:00)  T(F): 99.8 (09 Sep 2021 08:30), Max: 100.6 (09 Sep 2021 00:00)  HR: 65 (09 Sep 2021 09:00) (62 - 107)  BP: 99/49 (09 Sep 2021 09:00) (88/63 - 180/100)  BP(mean): 65 (09 Sep 2021 09:00) (57 - 122)  RR: 13 (09 Sep 2021 09:00) (1 - 46)  SpO2: 100% (09 Sep 2021 09:00) (95% - 100%)    T(C): 37.7 (21 @ 08:30), Max: 38.1 (21 @ 00:00)  T(F): 99.8 (21 @ 08:30), Max: 100.6 (21 @ 00:00)  T(C): 37.7 (21 @ 08:30), Max: 38.4 (21 @ 09:55)  T(F): 99.8 (21 @ 08:30), Max: 101.1 (21 @ 09:55)  T(C): 37.7 (21 @ 08:30), Max: 38.4 (21 @ 09:55)  T(F): 99.8 (21 @ 08:30), Max: 101.1 (21 @ 09:55)    PHYSICAL EXAM:  Constitutional: Well-developed, well nourished  Eyes: PERRLA, EOMI  Ear/Nose/Throat: oropharynx normal	  Neck: no JVD, no lymphadenopathy, supple, intubated via trach  Respiratory: no accessory muscle use, lung fields bilaterally upper airway sounds  Cardiovascular: distant  Gastrointestinal: soft, NT, no HSM, BS-normal  Extremities: no clubbing, no cyanosis, edema absent  Neuro: patient alert    Mode: AC/ CMV, RR (machine): 14, TV (machine): 400, FiO2: 40, PEEP: 5, ITime: 1, MAP: 11, PIP: 32    LABS:                        7.0    4.84  )-----------( 226      ( 09 Sep 2021 07:04 )             24.0       WBC 4.84   @ 07:04  WBC 7.65   @ 09:03  WBC 8.44   @ 06:55  WBC 26.45   @ 10:38          142  |  107  |  37<H>  ----------------------------<  152<H>  3.1<L>   |  29  |  1.24    Ca    8.1<L>      09 Sep 2021 07:04  Phos  2.0         TPro  6.0  /  Alb  2.0<L>  /  TBili  0.3  /  DBili  x   /  AST  61<H>  /  ALT  49  /  AlkPhos  80        Creatinine, Serum: 1.24 mg/dL (21 @ 07:04)  Creatinine, Serum: 1.61 mg/dL (21 @ 06:55)  Creatinine, Serum: 2.58 mg/dL (21 @ 10:56)      PT/INR - ( 09 Sep 2021 07:04 )   PT: 12.4 sec;   INR: 1.03 ratio         PTT - ( 07 Sep 2021 10:38 )  PTT:41.4 sec  Urinalysis Basic - ( 07 Sep 2021 10:41 )    Color: Yellow / Appearance: Turbid / S.010 / pH: x  Gluc: x / Ketone: Negative  / Bili: Negative / Urobili: Negative mg/dL   Blood: x / Protein: 500 mg/dL / Nitrite: Negative   Leuk Esterase: Moderate / RBC: 3-5 /HPF / WBC 26-50   Sq Epi: x / Non Sq Epi: Few / Bacteria: TNTC    MICROBIOLOGY:    Culture - Sputum . (21 @ 13:17)    Gram Stain:   Moderate polymorphonuclear leukocytes per low power field  Few Squamous epithelial cells per low power field  Moderate Gram Positive Rods per oil power field  Moderate Gram Negative Rods per oil power field  Few Yeast like cells per oil power field  Moderate Gram Negative Diplococci per oil power field    Specimen Source: .Sputum Sputum        RADIOLOGY & ADDITIONAL STUDIES:

## 2021-09-10 LAB
-  AMIKACIN: SIGNIFICANT CHANGE UP
-  AMIKACIN: SIGNIFICANT CHANGE UP
-  AMOXICILLIN/CLAVULANIC ACID: SIGNIFICANT CHANGE UP
-  AMPICILLIN/SULBACTAM: SIGNIFICANT CHANGE UP
-  AMPICILLIN: SIGNIFICANT CHANGE UP
-  AZTREONAM: SIGNIFICANT CHANGE UP
-  AZTREONAM: SIGNIFICANT CHANGE UP
-  CEFAZOLIN: SIGNIFICANT CHANGE UP
-  CEFEPIME: SIGNIFICANT CHANGE UP
-  CEFEPIME: SIGNIFICANT CHANGE UP
-  CEFOXITIN: SIGNIFICANT CHANGE UP
-  CEFTAZIDIME: SIGNIFICANT CHANGE UP
-  CEFTRIAXONE: SIGNIFICANT CHANGE UP
-  CIPROFLOXACIN: SIGNIFICANT CHANGE UP
-  CIPROFLOXACIN: SIGNIFICANT CHANGE UP
-  ERTAPENEM: SIGNIFICANT CHANGE UP
-  GENTAMICIN: SIGNIFICANT CHANGE UP
-  GENTAMICIN: SIGNIFICANT CHANGE UP
-  IMIPENEM: SIGNIFICANT CHANGE UP
-  LEVOFLOXACIN: SIGNIFICANT CHANGE UP
-  LEVOFLOXACIN: SIGNIFICANT CHANGE UP
-  MEROPENEM: SIGNIFICANT CHANGE UP
-  MEROPENEM: SIGNIFICANT CHANGE UP
-  PIPERACILLIN/TAZOBACTAM: SIGNIFICANT CHANGE UP
-  PIPERACILLIN/TAZOBACTAM: SIGNIFICANT CHANGE UP
-  TOBRAMYCIN: SIGNIFICANT CHANGE UP
-  TOBRAMYCIN: SIGNIFICANT CHANGE UP
-  TRIMETHOPRIM/SULFAMETHOXAZOLE: SIGNIFICANT CHANGE UP
ALBUMIN SERPL ELPH-MCNC: 2 G/DL — LOW (ref 3.3–5)
ALP SERPL-CCNC: 72 U/L — SIGNIFICANT CHANGE UP (ref 30–120)
ALT FLD-CCNC: 36 U/L DA — SIGNIFICANT CHANGE UP (ref 10–60)
ANION GAP SERPL CALC-SCNC: 7 MMOL/L — SIGNIFICANT CHANGE UP (ref 5–17)
AST SERPL-CCNC: 39 U/L — SIGNIFICANT CHANGE UP (ref 10–40)
BILIRUB SERPL-MCNC: 0.4 MG/DL — SIGNIFICANT CHANGE UP (ref 0.2–1.2)
BUN SERPL-MCNC: 19 MG/DL — SIGNIFICANT CHANGE UP (ref 7–23)
CALCIUM SERPL-MCNC: 8.4 MG/DL — SIGNIFICANT CHANGE UP (ref 8.4–10.5)
CHLORIDE SERPL-SCNC: 109 MMOL/L — HIGH (ref 96–108)
CO2 SERPL-SCNC: 24 MMOL/L — SIGNIFICANT CHANGE UP (ref 22–31)
CREAT SERPL-MCNC: 1.08 MG/DL — SIGNIFICANT CHANGE UP (ref 0.5–1.3)
CULTURE RESULTS: SIGNIFICANT CHANGE UP
FERRITIN SERPL-MCNC: 152 NG/ML — HIGH (ref 15–150)
FOLATE SERPL-MCNC: >20 NG/ML — SIGNIFICANT CHANGE UP
GLUCOSE BLDC GLUCOMTR-MCNC: 127 MG/DL — HIGH (ref 70–99)
GLUCOSE BLDC GLUCOMTR-MCNC: 158 MG/DL — HIGH (ref 70–99)
GLUCOSE BLDC GLUCOMTR-MCNC: 176 MG/DL — HIGH (ref 70–99)
GLUCOSE BLDC GLUCOMTR-MCNC: 177 MG/DL — HIGH (ref 70–99)
GLUCOSE BLDC GLUCOMTR-MCNC: 192 MG/DL — HIGH (ref 70–99)
GLUCOSE SERPL-MCNC: 185 MG/DL — HIGH (ref 70–99)
HCT VFR BLD CALC: 24.1 % — LOW (ref 34.5–45)
HGB BLD-MCNC: 7 G/DL — CRITICAL LOW (ref 11.5–15.5)
MAGNESIUM SERPL-MCNC: 1.7 MG/DL — SIGNIFICANT CHANGE UP (ref 1.6–2.6)
MCHC RBC-ENTMCNC: 26 PG — LOW (ref 27–34)
MCHC RBC-ENTMCNC: 29 GM/DL — LOW (ref 32–36)
MCV RBC AUTO: 89.6 FL — SIGNIFICANT CHANGE UP (ref 80–100)
METHOD TYPE: SIGNIFICANT CHANGE UP
METHOD TYPE: SIGNIFICANT CHANGE UP
NRBC # BLD: 0 /100 WBCS — SIGNIFICANT CHANGE UP (ref 0–0)
ORGANISM # SPEC MICROSCOPIC CNT: SIGNIFICANT CHANGE UP
PLATELET # BLD AUTO: 206 K/UL — SIGNIFICANT CHANGE UP (ref 150–400)
POTASSIUM SERPL-MCNC: 4.3 MMOL/L — SIGNIFICANT CHANGE UP (ref 3.5–5.3)
POTASSIUM SERPL-SCNC: 4.3 MMOL/L — SIGNIFICANT CHANGE UP (ref 3.5–5.3)
PROT SERPL-MCNC: 6 G/DL — SIGNIFICANT CHANGE UP (ref 6–8.3)
RBC # BLD: 2.69 M/UL — LOW (ref 3.8–5.2)
RBC # FLD: 16.1 % — HIGH (ref 10.3–14.5)
SODIUM SERPL-SCNC: 140 MMOL/L — SIGNIFICANT CHANGE UP (ref 135–145)
SPECIMEN SOURCE: SIGNIFICANT CHANGE UP
T4 FREE SERPL-MCNC: 1.6 NG/DL — SIGNIFICANT CHANGE UP (ref 0.9–1.8)
TSH SERPL-MCNC: 1.77 UIU/ML — SIGNIFICANT CHANGE UP (ref 0.27–4.2)
VIT B12 SERPL-MCNC: 1639 PG/ML — HIGH (ref 232–1245)
WBC # BLD: 5.5 K/UL — SIGNIFICANT CHANGE UP (ref 3.8–10.5)
WBC # FLD AUTO: 5.5 K/UL — SIGNIFICANT CHANGE UP (ref 3.8–10.5)

## 2021-09-10 PROCEDURE — 99233 SBSQ HOSP IP/OBS HIGH 50: CPT

## 2021-09-10 RX ORDER — SODIUM CHLORIDE 9 MG/ML
1000 INJECTION, SOLUTION INTRAVENOUS ONCE
Refills: 0 | Status: COMPLETED | OUTPATIENT
Start: 2021-09-10 | End: 2021-09-10

## 2021-09-10 RX ORDER — IRON SUCROSE 20 MG/ML
100 INJECTION, SOLUTION INTRAVENOUS EVERY 24 HOURS
Refills: 0 | Status: COMPLETED | OUTPATIENT
Start: 2021-09-10 | End: 2021-09-12

## 2021-09-10 RX ORDER — IRON SUCROSE 20 MG/ML
100 INJECTION, SOLUTION INTRAVENOUS EVERY 24 HOURS
Refills: 0 | Status: DISCONTINUED | OUTPATIENT
Start: 2021-09-10 | End: 2021-09-10

## 2021-09-10 RX ORDER — LINEZOLID 600 MG/300ML
600 INJECTION, SOLUTION INTRAVENOUS EVERY 12 HOURS
Refills: 0 | Status: COMPLETED | OUTPATIENT
Start: 2021-09-10 | End: 2021-09-15

## 2021-09-10 RX ADMIN — Medication 0.5 MILLIGRAM(S): at 21:50

## 2021-09-10 RX ADMIN — Medication 1 GRAM(S): at 12:57

## 2021-09-10 RX ADMIN — SODIUM CHLORIDE 1000 MILLILITER(S): 9 INJECTION, SOLUTION INTRAVENOUS at 16:41

## 2021-09-10 RX ADMIN — Medication 0.5 MILLIGRAM(S): at 14:28

## 2021-09-10 RX ADMIN — CHLORHEXIDINE GLUCONATE 1 APPLICATION(S): 213 SOLUTION TOPICAL at 12:59

## 2021-09-10 RX ADMIN — CHLORHEXIDINE GLUCONATE 15 MILLILITER(S): 213 SOLUTION TOPICAL at 06:07

## 2021-09-10 RX ADMIN — FLUCONAZOLE 100 MILLIGRAM(S): 150 TABLET ORAL at 12:57

## 2021-09-10 RX ADMIN — FENTANYL CITRATE 1 PATCH: 50 INJECTION INTRAVENOUS at 18:47

## 2021-09-10 RX ADMIN — SIMETHICONE 80 MILLIGRAM(S): 80 TABLET, CHEWABLE ORAL at 12:57

## 2021-09-10 RX ADMIN — Medication 1 MILLIGRAM(S): at 12:54

## 2021-09-10 RX ADMIN — Medication 1 GRAM(S): at 18:47

## 2021-09-10 RX ADMIN — Medication 81 MILLIGRAM(S): at 12:57

## 2021-09-10 RX ADMIN — FENTANYL CITRATE 1 PATCH: 50 INJECTION INTRAVENOUS at 19:00

## 2021-09-10 RX ADMIN — PIPERACILLIN AND TAZOBACTAM 25 GRAM(S): 4; .5 INJECTION, POWDER, LYOPHILIZED, FOR SOLUTION INTRAVENOUS at 14:31

## 2021-09-10 RX ADMIN — Medication 0.5 MILLIGRAM(S): at 06:05

## 2021-09-10 RX ADMIN — PANTOPRAZOLE SODIUM 40 MILLIGRAM(S): 20 TABLET, DELAYED RELEASE ORAL at 06:05

## 2021-09-10 RX ADMIN — PIPERACILLIN AND TAZOBACTAM 25 GRAM(S): 4; .5 INJECTION, POWDER, LYOPHILIZED, FOR SOLUTION INTRAVENOUS at 03:05

## 2021-09-10 RX ADMIN — SIMETHICONE 80 MILLIGRAM(S): 80 TABLET, CHEWABLE ORAL at 18:46

## 2021-09-10 RX ADMIN — Medication 3 MILLILITER(S): at 08:51

## 2021-09-10 RX ADMIN — INSULIN GLARGINE 36 UNIT(S): 100 INJECTION, SOLUTION SUBCUTANEOUS at 21:50

## 2021-09-10 RX ADMIN — Medication 3 MILLILITER(S): at 13:52

## 2021-09-10 RX ADMIN — Medication 2 TABLET(S): at 12:57

## 2021-09-10 RX ADMIN — Medication 1 GRAM(S): at 06:04

## 2021-09-10 RX ADMIN — Medication 10 MILLIGRAM(S): at 21:50

## 2021-09-10 RX ADMIN — CHLORHEXIDINE GLUCONATE 15 MILLILITER(S): 213 SOLUTION TOPICAL at 18:46

## 2021-09-10 RX ADMIN — POLYETHYLENE GLYCOL 3350 17 GRAM(S): 17 POWDER, FOR SOLUTION ORAL at 12:57

## 2021-09-10 RX ADMIN — LINEZOLID 600 MILLIGRAM(S): 600 INJECTION, SOLUTION INTRAVENOUS at 18:46

## 2021-09-10 RX ADMIN — FENTANYL CITRATE 1 PATCH: 50 INJECTION INTRAVENOUS at 19:41

## 2021-09-10 RX ADMIN — Medication 3 MILLILITER(S): at 18:36

## 2021-09-10 RX ADMIN — SIMETHICONE 80 MILLIGRAM(S): 80 TABLET, CHEWABLE ORAL at 06:04

## 2021-09-10 RX ADMIN — PANTOPRAZOLE SODIUM 40 MILLIGRAM(S): 20 TABLET, DELAYED RELEASE ORAL at 18:46

## 2021-09-10 RX ADMIN — FENTANYL CITRATE 1 PATCH: 50 INJECTION INTRAVENOUS at 07:45

## 2021-09-10 RX ADMIN — Medication 3 MILLILITER(S): at 03:23

## 2021-09-10 RX ADMIN — Medication 2: at 13:01

## 2021-09-10 RX ADMIN — IRON SUCROSE 100 MILLIGRAM(S): 20 INJECTION, SOLUTION INTRAVENOUS at 12:57

## 2021-09-10 RX ADMIN — SENNA PLUS 2 TABLET(S): 8.6 TABLET ORAL at 21:50

## 2021-09-10 RX ADMIN — SIMETHICONE 80 MILLIGRAM(S): 80 TABLET, CHEWABLE ORAL at 23:10

## 2021-09-10 RX ADMIN — Medication 1 GRAM(S): at 23:10

## 2021-09-10 RX ADMIN — Medication 2: at 23:50

## 2021-09-10 RX ADMIN — Medication 2: at 06:27

## 2021-09-10 NOTE — PROGRESS NOTE ADULT - ASSESSMENT
74 yo f pmhx dementia, CVA, VDRF s/p trach/peg, quadriplegia, DM, HTN, GERD, constipation admitted with   s/p card arrest - sepsis - shock - acute infection - dementia - chr resp failure    ID f/u noted  ABX for 5 days total    GOC discussion  pt is full code   is the HCP  on TF  on NEBS  on ABX rx regimen  assist with needs - ADL  HOB elev - orgal hygiene - skin care  prognosis guarded to poor

## 2021-09-10 NOTE — PROGRESS NOTE ADULT - SUBJECTIVE AND OBJECTIVE BOX
Patient is a 75y old  Female who presents with a chief complaint of Unresponsive, post-cardiac arrest (10 Sep 2021 09:28)    INTERVAL HPI/OVERNIGHT EVENTS: events noted, new inflammation of left first toe noted by RN.     MEDICATIONS  (STANDING):  albuterol/ipratropium for Nebulization 3 milliLiter(s) Nebulizer every 6 hours  aspirin  chewable 81 milliGRAM(s) Enteral Tube daily  bisacodyl Suppository 10 milliGRAM(s) Rectal at bedtime  chlorhexidine 0.12% Liquid 15 milliLiter(s) Oral Mucosa two times a day  chlorhexidine 2% Cloths 1 Application(s) Topical daily  dextrose 40% Gel 15 Gram(s) Oral once  dextrose 5%. 1000 milliLiter(s) (50 mL/Hr) IV Continuous <Continuous>  dextrose 5%. 1000 milliLiter(s) (100 mL/Hr) IV Continuous <Continuous>  dextrose 50% Injectable 25 Gram(s) IV Push once  dextrose 50% Injectable 12.5 Gram(s) IV Push once  dextrose 50% Injectable 25 Gram(s) IV Push once  fentaNYL   Patch  12 MICROgram(s)/Hr 1 Patch Transdermal every 72 hours  fluconAZOLE   Tablet 100 milliGRAM(s) Oral daily  glucagon  Injectable 1 milliGRAM(s) IntraMuscular once  insulin glargine Injectable (LANTUS) 36 Unit(s) SubCutaneous at bedtime  insulin lispro (ADMELOG) corrective regimen sliding scale   SubCutaneous every 6 hours  iron sucrose Injectable 100 milliGRAM(s) IV Push every 24 hours  lactated ringers. 1000 milliLiter(s) (75 mL/Hr) IV Continuous <Continuous>  lactobacillus acidophilus 2 Tablet(s) Oral daily  linezolid    Tablet 600 milliGRAM(s) Oral every 12 hours  LORazepam   Injectable 0.5 milliGRAM(s) IV Push three times a day  norepinephrine Infusion 0.05 MICROgram(s)/kG/Min (2.77 mL/Hr) IV Continuous <Continuous>  pantoprazole  Injectable 40 milliGRAM(s) IV Push two times a day  piperacillin/tazobactam IVPB.. 3.375 Gram(s) IV Intermittent every 12 hours  polyethylene glycol 3350 17 Gram(s) Oral daily  senna 2 Tablet(s) Oral at bedtime  simethicone 80 milliGRAM(s) Chew every 6 hours  sucralfate suspension 1 Gram(s) Oral four times a day    MEDICATIONS  (PRN):  acetaminophen   Tablet .. 650 milliGRAM(s) Oral every 6 hours PRN Temp greater or equal to 38C (100.4F), Mild Pain (1 - 3)  sodium chloride 0.9% lock flush 10 milliLiter(s) IV Push every 1 hour PRN Pre/post blood products, medications, blood draw, and to maintain line patency      Allergies  codeine (Hives)      REVIEW OF SYSTEMS:  unable.     Vital Signs Last 24 Hrs  T(C): 37.1 (10 Sep 2021 08:08), Max: 37.7 (09 Sep 2021 12:00)  T(F): 98.8 (10 Sep 2021 08:08), Max: 99.9 (09 Sep 2021 12:00)  HR: 85 (10 Sep 2021 11:26) (65 - 128)  BP: 91/48 (10 Sep 2021 09:00) (91/48 - 196/48)  BP(mean): 61 (10 Sep 2021 09:00) (61 - 88)  RR: 14 (10 Sep 2021 09:00) (11 - 50)  SpO2: 98% (10 Sep 2021 11:26) (97% - 100%)    PHYSICAL EXAM:  GENERAL: NAD  HEAD:  Atraumatic, Normocephalic  EYES:  conjunctiva and sclera clear  ENMT: Trach in place  NERVOUS SYSTEM:  eyes open, contracted.  at times patient has jerky movements.   CHEST/LUNG: Clear to auscultation bilaterally;   HEART: Regular rate and rhythm;   ABDOMEN: Soft, Nontender; Bowel sounds present  EXTREMITIES:  2+ Peripheral Pulses, No edema, left first toe swollen, dark areas, elongated nail.       LABS:                        7.0    5.50  )-----------( 206      ( 10 Sep 2021 08:17 )             24.1     10 Sep 2021 08:17    140    |  109    |  19     ----------------------------<  185    4.3     |  24     |  1.08     Ca    8.4        10 Sep 2021 08:17  Mg     1.7       10 Sep 2021 08:17    TPro  6.0    /  Alb  2.0    /  TBili  0.4    /  DBili  x      /  AST  39     /  ALT  36     /  AlkPhos  72     10 Sep 2021 08:17    PT/INR - ( 09 Sep 2021 07:04 )   PT: 12.4 sec;   INR: 1.03 ratio         CAPILLARY BLOOD GLUCOSE  POCT Blood Glucose.: 192 mg/dL (10 Sep 2021 06:25)  POCT Blood Glucose.: 154 mg/dL (09 Sep 2021 23:39)  POCT Blood Glucose.: 122 mg/dL (09 Sep 2021 21:36)  POCT Blood Glucose.: 156 mg/dL (09 Sep 2021 18:09)  POCT Blood Glucose.: 159 mg/dL (09 Sep 2021 11:40)      RADIOLOGY & ADDITIONAL TESTS:    Imaging Personally Reviewed:  [ ] YES     Consultant(s) Notes Reviewed:      Care Discussed with Consultants/Other Providers:    Advanced Directives: [ ] DNR  [ ] No feeding tube  [ ] MOLST in chart  [ ] MOLST completed today  [ ] Unknown

## 2021-09-10 NOTE — PROGRESS NOTE ADULT - ASSESSMENT
74 y/o f trach dependent, with PEG tube, DM, HTN, GERD, CVA, dementia, constipation p/w being found unresponsive at [facility], brought in by EMS for post cardiac arrest care.  Noted to be febrile with leukocytosis and bandemia.    RECOMMENDATIONS    Sepsis - pt has vaughn, chronic vent support with trach, unclear localization.     rec continue ZOSYN (started 9/7) with recs to follow but high risk for PNA, recommend 5 days so 9/11 as last day     Perionychia pt is MRSA PCR neg, has been on Zosyn so some concerns regarding this being pressure related. Acutely developed so suggest mostly likely superficial. Will add linezolid 600mg PO BID x 5 days     anemia and electrolyte management per primary [facility] physician    We will follow along in the care of this patient. Please call us at [phone] or text me directly on my cell# at [phone] with any concerns.

## 2021-09-10 NOTE — PROGRESS NOTE ADULT - SUBJECTIVE AND OBJECTIVE BOX
Chief Complaint: Cardiac arrest    Interval Events: No events overnight.    Review of Systems:  Unable to obtain    Physical Exam:  Vitals:        Vital Signs Last 24 Hrs  T(C): 37.7 (09 Sep 2021 08:30), Max: 38.1 (09 Sep 2021 00:00)  T(F): 99.8 (09 Sep 2021 08:30), Max: 100.6 (09 Sep 2021 00:00)  HR: 65 (09 Sep 2021 09:00) (62 - 107)  BP: 99/49 (09 Sep 2021 09:00) (88/63 - 180/100)  BP(mean): 65 (09 Sep 2021 09:00) (57 - 122)  RR: 13 (09 Sep 2021 09:00) (1 - 46)  SpO2: 100% (09 Sep 2021 09:00) (95% - 100%)  General: NAD  HEENT: Trach  Neck: No JVD, no carotid bruit  Lungs: CTAB  CV: RRR, nl S1/S2, no M/R/G  Abdomen: S/NT/ND, +BS  Extremities: No LE edema, no cyanosis  Neuro: AAOx0, contracted  Skin: No rash    Labs:                        7.0    4.84  )-----------( 226      ( 09 Sep 2021 07:04 )             24.0     09-09    142  |  107  |  37<H>  ----------------------------<  152<H>  3.1<L>   |  29  |  1.24    Ca    8.1<L>      09 Sep 2021 07:04  Phos  2.0     09-09    TPro  6.0  /  Alb  2.0<L>  /  TBili  0.3  /  DBili  x   /  AST  61<H>  /  ALT  49  /  AlkPhos  80  09-09    CARDIAC MARKERS ( 09 Sep 2021 07:04 )  .035 ng/mL / x     / x     / x     / x      CARDIAC MARKERS ( 08 Sep 2021 06:55 )  .091 ng/mL / x     / x     / x     / x      CARDIAC MARKERS ( 07 Sep 2021 20:59 )  .123 ng/mL / x     / x     / x     / x      CARDIAC MARKERS ( 07 Sep 2021 10:38 )  .127 ng/mL / x     / x     / x     / x          PT/INR - ( 09 Sep 2021 07:04 )   PT: 12.4 sec;   INR: 1.03 ratio         PTT - ( 07 Sep 2021 10:38 )  PTT:41.4 sec    Telemetry: Sinus rhythm

## 2021-09-10 NOTE — PROGRESS NOTE ADULT - ASSESSMENT
VIRIDIANAAMI BREA 75 f [facility] P 9/7/2021   DR ILIANA KEMP     REVIEW OF SYMPTOMS      Able to give (reliable) ROS  NO     PHYSICAL EXAM    HEENT Unremarkable  atraumatic   RESP Fair air entry EXP prolonged    Harsh breath sound Resp distres mild   CARDIAC S1 S2 No S3     NO JVD    ABDOMEN SOFT BS PRESENT NOT DISTENDED No hepatosplenomegaly   PEDAL EDEMA present No calf tenderness  NO rash        9/7/2021 PATIENT PRESENTATION.  74F PMH Frontotemporal dementia, CVA, chronic vent dependent respiratory failure, dysphagia s/p PEG, recurrent UTI/VAP Past HO sputum Pseudomonas resistant zosyn presents sp cac at [facility] nursing facility on 9/7/2021   In syo er on 9/7/2021 already given 4l ns   Pulm critical care consulted 9/7/2021     RECENT HOSPITAL STAYS   6/15-6/23 [facility] p    6/19/2021 fungitell 44  6/19 caspofungin  10/30-11/6 [facility] P      Home meds poa included meropenem bd duragesic 12 lorazepam 1.3 asa 81 lovnx 40 lantus 40 hs      9/7/2021 PRESENTING PROBLEMS.        SP CAC AT HCF FEW MIN 9/7/2021   POSSIBLE ASP PNEUMONIA poa  SHOCK poa   TRACH POA   VENT DEPENENT POA   PEG POA  DM   [facility] APHASIC STROKE       BEST PRACTICE ISSUES.                                                  HEAD OF BED ELEVATION. Yes  DVT PROPHYLAXIS.  lvnx 30 (9/7/2021) -> hpsc (9/7) -> dced 9/8 drop in hb                                         SQUIRES PROPHYLAXIS.   protonix 40 (9/7/2021)       carafate 1.4 (9/7/2021)                                                                                   DIET.     npo 9/7/2021  -> glucerna 1.5 1200 (9/7)   -> 2cal 800 (9/9)                   INFECTION PROPHYLAXIS.           chlorhexidine 2% (9/8)            chlorhexidine .12 9/9/2021     GENERAL ISSUES  GOC ADVANCED DIRECTIVE.                                         ALLERGY.                            WEIGHT.         9/7/2021 130                           BMI.                   9/7/2021  22      PATIENT DATA   TUBES  PROCEDURES.      9/7/2021 Attempted by er md andrews and r subclav unsuccessful   9/7/2021 IO   9/7/2021 vaughn  9/8/2021 C line lij  ccpa   9/9/2021 vaughn dced     PEG poa  TRACH poa     ABG.       OXYGENATION.                   VITALS/IO/VENT/DRIPS.    9/10/2021 80 98/48   9/10/2021 ac 14/400/5/.3          PROBLEM ASSESSMENT/RECOMMENDATIONS.    COVID PROFILE.  scv2 9/7/2021 (-)  spk ab 9/8 (+) 250   No current covid infection    SP CAC AT HCF FEW MIN 9/7/2021.   there was low grade troponin elevation on admission which down trended  and was likely demand ischemia   Opens eyes     POSSIBLE ASP PNEUMONIA poa.  9/7 ct basal as neum distended esophagus   mrsa 9/8/2021 (-)   urine 9/7 proteus   sputum 9/8 serratia pseudomonas   Diflucan 100 x 5d (9/7/2021)   zosyn x 7d (9/7/2021) (Dr Mcpherson) (Pneu)    linezolid 9/10/2021 x 5d (Dr Mcpherson) (paronychia)       SHOCK poa. Resolvd   lr 150 x 8 h then lr  75 (9/7/2021)   norepi 9/7/2021 12p  norepi dced 9/9/2021   C line placed 9/8/2021  target map 65 (+)    LACTICEMIA  poa.  la 9/7-9/7-9/8/2021 la 13.5-3.7- 1     TRACH POA.     VENT DEPENENT POA.   Target pH 730 (+) PO2 60 (+) po 90-95% Pplat 35 (-)   HOBE DSV DSBT  9/8/2021 No abg available resp could not get abg     COPD.  duoneb (9/7/2021)       RO MI.   Tr 9/7-9/8/2021 tr .17- .091   ASA 81 (9/7/2021)   metoprolol 25.2 (9/7/2021)     RO CHF.   bnp 9/7/2021 bnp 2391   echo 6/15/2021 n lvsf dd1     ANEMIA   Hb 9/7-9/8-9/9 -9/1011.6-7.6- 7.3 - 7.7   gi on case   Drop likely sec to hydration Need to exclude abla       PEG POA.    GERD.  9/7/2021 ct cap gerd   protonix 40  9/7/2021)                                             ELEVATED LFTS POA.  LFTS 9/7/2021  ap 122  ast 205  alt 81                                        COLON ILEUS ON CTAP 9/7/2021.   bisacodyl 10 (9/7/2021)   miralax (9/7/2021)     RYAN poa.  Cr 9/7-9/8-9/9-9/10/2021 Cr 2.5- 1.6- 1.2 - 1   lr 150 x 8 h then ns 75 (9/7/2021)     DM.   insulin     PMH APHASIC STROKE.   AC R SPHENOID SINUSITIS 9/7/2021 CT.   ct head 9/7/2021 No ac poss ac r sphenoid sinusitis    PAIN.   fentnyl 12 (9/7/2021)    ANXIETY.   lorazepam 1.3 (9/7/2021)     SEIZURES 9/9/2021   Given lorazepam 4 on 9/9/2021   Neuro called 9/9/2021     TOES LESION NOTED 9/10/2021   9/10/2021 Podiatry to be called          OVERALL PLAN.  SP CAC C monitor C enz Cardio on case    VENT Vent bndle  ASP PNEUM bsab  RYAN Hydration monitor Improvd   SHOCK  target pap 65 (+)  DROP IN HB 9/8/2021 Likely sec fluids gi on case no ongoing gi bl suspectd no egd plannd    SZ on anticonvulsants (9/9/2021)   TOES lesion noted 9/10/2021       TIME SPENT   Over 36 minutes aggregate critical care time spent on encounter; activities included   direct patient care, counseling and/or coordinating care reviewing notes, lab data/ imaging , discussion with multidisciplinary team/ patient  /family and explaining in detail risks, benefits, alternatives  of the recommendations     CHAPINCITO GUIDRY 75 f [facility] P 9/7/2021   DR ILIANA KEMP

## 2021-09-10 NOTE — PROGRESS NOTE ADULT - SUBJECTIVE AND OBJECTIVE BOX
Neurology follow up note    BREA [id number]      Interval History:    Patient resting in bed    Allergies    codeine (Hives)    Intolerances        MEDICATIONS    acetaminophen   Tablet .. 650 milliGRAM(s) Oral every 6 hours PRN  albuterol/ipratropium for Nebulization 3 milliLiter(s) Nebulizer every 6 hours  aspirin  chewable 81 milliGRAM(s) Enteral Tube daily  bisacodyl Suppository 10 milliGRAM(s) Rectal at bedtime  chlorhexidine 0.12% Liquid 15 milliLiter(s) Oral Mucosa two times a day  chlorhexidine 2% Cloths 1 Application(s) Topical daily  dextrose 40% Gel 15 Gram(s) Oral once  dextrose 5%. 1000 milliLiter(s) IV Continuous <Continuous>  dextrose 5%. 1000 milliLiter(s) IV Continuous <Continuous>  dextrose 50% Injectable 25 Gram(s) IV Push once  dextrose 50% Injectable 12.5 Gram(s) IV Push once  dextrose 50% Injectable 25 Gram(s) IV Push once  fentaNYL   Patch  12 MICROgram(s)/Hr 1 Patch Transdermal every 72 hours  fluconAZOLE   Tablet 100 milliGRAM(s) Oral daily  glucagon  Injectable 1 milliGRAM(s) IntraMuscular once  insulin glargine Injectable (LANTUS) 36 Unit(s) SubCutaneous at bedtime  insulin lispro (ADMELOG) corrective regimen sliding scale   SubCutaneous every 6 hours  iron sucrose Injectable 100 milliGRAM(s) IV Push every 24 hours  lactated ringers. 1000 milliLiter(s) IV Continuous <Continuous>  lactobacillus acidophilus 2 Tablet(s) Oral daily  linezolid    Tablet 600 milliGRAM(s) Oral every 12 hours  LORazepam   Injectable 1 milliGRAM(s) IV Push every 2 hours PRN  LORazepam   Injectable 0.5 milliGRAM(s) IV Push three times a day  norepinephrine Infusion 0.05 MICROgram(s)/kG/Min IV Continuous <Continuous>  pantoprazole  Injectable 40 milliGRAM(s) IV Push two times a day  piperacillin/tazobactam IVPB.. 3.375 Gram(s) IV Intermittent every 12 hours  polyethylene glycol 3350 17 Gram(s) Oral daily  senna 2 Tablet(s) Oral at bedtime  simethicone 80 milliGRAM(s) Chew every 6 hours  sodium chloride 0.9% lock flush 10 milliLiter(s) IV Push every 1 hour PRN  sucralfate suspension 1 Gram(s) Oral four times a day              Vital Signs Last 24 Hrs  T(C): 37.1 (10 Sep 2021 08:08), Max: 37.7 (09 Sep 2021 20:57)  T(F): 98.8 (10 Sep 2021 08:08), Max: 99.8 (09 Sep 2021 20:57)  HR: 98 (10 Sep 2021 14:10) (65 - 118)  BP: 140/54 (10 Sep 2021 14:10) (91/48 - 152/57)  BP(mean): 80 (10 Sep 2021 14:10) (61 - 88)  RR: 21 (10 Sep 2021 14:10) (11 - 29)  SpO2: 99% (10 Sep 2021 14:10) (97% - 100%)    REVIEW OF SYSTEMS:  Could not be obtained secondary to the patient being nonverbal.  As per my conversation with the spouse, a gradual process along with underlying strokes causing her to become bed-bound.    PHYSICAL EXAMINATION:    Head:  Normocephalic, atraumatic.  Eyes:  No scleral icterus.  Ears:  Cannot be evaluated secondary to the patient being nonverbal.  NECK:  Tracheostomy was in place.  RESPIRATORY:  Good air entry bilaterally.  CARDIOVASCULAR:  S1 and S2 heard.  ABDOMEN:  Soft and nontender.  EXTREMITIES:  No clubbing or cyanosis were noted.      NEUROLOGIC:  The patient was awake in bed.  Upon stimulating the patient, her eyes did roll up.  Her body started to stiffen with abnormal mouth movements, lasted one to two minutes history of this  Pupils bilaterally were 3 mm, reactive to 2 mm.  The patient has her arms in a flexed position with both hands contracted.  Bilateral lower extremities were in a straight position.  The patient has increased tone, bilateral upper and lower extremities.              LABS:  CBC Full  -  ( 10 Sep 2021 08:17 )  WBC Count : 5.50 K/uL  RBC Count : 2.69 M/uL  Hemoglobin : 7.0 g/dL  Hematocrit : 24.1 %  Platelet Count - Automated : 206 K/uL  Mean Cell Volume : 89.6 fl  Mean Cell Hemoglobin : 26.0 pg  Mean Cell Hemoglobin Concentration : 29.0 gm/dL  Auto Neutrophil # : x  Auto Lymphocyte # : x  Auto Monocyte # : x  Auto Eosinophil # : x  Auto Basophil # : x  Auto Neutrophil % : x  Auto Lymphocyte % : x  Auto Monocyte % : x  Auto Eosinophil % : x  Auto Basophil % : x      09-10    140  |  109<H>  |  19  ----------------------------<  185<H>  4.3   |  24  |  1.08    Ca    8.4      10 Sep 2021 08:17  Phos  2.0     09-09  Mg     1.7     09-10    TPro  6.0  /  Alb  2.0<L>  /  TBili  0.4  /  DBili  x   /  AST  39  /  ALT  36  /  AlkPhos  72  09-10    Hemoglobin A1C:     LIVER FUNCTIONS - ( 10 Sep 2021 08:17 )  Alb: 2.0 g/dL / Pro: 6.0 g/dL / ALK PHOS: 72 U/L / ALT: 36 U/L DA / AST: 39 U/L / GGT: x           Vitamin B12 Vitamin B12, Serum: 1639 pg/mL (09-09 @ 18:50)    PT/INR - ( 09 Sep 2021 07:04 )   PT: 12.4 sec;   INR: 1.03 ratio               RADIOLOGY      ANALYSIS AND PLAN:  This is a 75-year-old with an episode of cardiac arrest and abnormal movements.  For episodes of cardiac arrest, continue to monitor heart rate on telemetry.  If possible, please maintain a systolic blood pressure greater than 100, to help maintain cerebral perfusion.  For history of abnormal movements, as per my conversation with the spouse, this is a known condition that happens to her, as per him with GI-related issues.  She will start to hyperventilate, eyes will roll up, will have abnormal mouth movement, and tried to move from side-to-side.  She has undergone extensive EEG monitoring and has captured these events on numerous occasions and deemed to be non-epileptiform.  For now, I would recommend supportive therapy.  No antiepileptic medications.  For history of diabetes, strict control of blood sugars.  For history of cerebrovascular accident, continue the patient on her home medications.    The spouse's name is Marciano.  His telephone number is [phone] 9/09.  I had a lengthy discussion, we will not be starting any antiepileptic medications.    Greater than 40 minutes of time was spent with the patient, plan of care, reviewing data, speaking to the multidisciplinary healthcare team with greater than 50% of that time in counseling and care coordination.

## 2021-09-10 NOTE — PROGRESS NOTE ADULT - SUBJECTIVE AND OBJECTIVE BOX
Chief Complaint:        INTERVAL HPI/OVERNIGHT EVENTS:  no significant events overnight reported       MEDICATIONS  (STANDING):  albuterol/ipratropium for Nebulization 3 milliLiter(s) Nebulizer every 6 hours  aspirin  chewable 81 milliGRAM(s) Enteral Tube daily  bisacodyl Suppository 10 milliGRAM(s) Rectal at bedtime  chlorhexidine 0.12% Liquid 15 milliLiter(s) Oral Mucosa two times a day  chlorhexidine 2% Cloths 1 Application(s) Topical daily  dextrose 40% Gel 15 Gram(s) Oral once  dextrose 5%. 1000 milliLiter(s) (50 mL/Hr) IV Continuous <Continuous>  dextrose 5%. 1000 milliLiter(s) (100 mL/Hr) IV Continuous <Continuous>  dextrose 50% Injectable 25 Gram(s) IV Push once  dextrose 50% Injectable 12.5 Gram(s) IV Push once  dextrose 50% Injectable 25 Gram(s) IV Push once  fentaNYL   Patch  12 MICROgram(s)/Hr 1 Patch Transdermal every 72 hours  fluconAZOLE   Tablet 100 milliGRAM(s) Oral daily  glucagon  Injectable 1 milliGRAM(s) IntraMuscular once  insulin glargine Injectable (LANTUS) 36 Unit(s) SubCutaneous at bedtime  insulin lispro (ADMELOG) corrective regimen sliding scale   SubCutaneous every 6 hours  iron sucrose Injectable 100 milliGRAM(s) IV Push every 24 hours  lactated ringers. 1000 milliLiter(s) (75 mL/Hr) IV Continuous <Continuous>  lactobacillus acidophilus 2 Tablet(s) Oral daily  linezolid    Tablet 600 milliGRAM(s) Oral every 12 hours  LORazepam   Injectable 0.5 milliGRAM(s) IV Push three times a day  norepinephrine Infusion 0.05 MICROgram(s)/kG/Min (2.77 mL/Hr) IV Continuous <Continuous>  pantoprazole  Injectable 40 milliGRAM(s) IV Push two times a day  piperacillin/tazobactam IVPB.. 3.375 Gram(s) IV Intermittent every 12 hours  polyethylene glycol 3350 17 Gram(s) Oral daily  senna 2 Tablet(s) Oral at bedtime  simethicone 80 milliGRAM(s) Chew every 6 hours  sucralfate suspension 1 Gram(s) Oral four times a day    MEDICATIONS  (PRN):  acetaminophen   Tablet .. 650 milliGRAM(s) Oral every 6 hours PRN Temp greater or equal to 38C (100.4F), Mild Pain (1 - 3)  sodium chloride 0.9% lock flush 10 milliLiter(s) IV Push every 1 hour PRN Pre/post blood products, medications, blood draw, and to maintain line patency            Vital Signs Last 24 Hrs  T(C): 37.1 (10 Sep 2021 08:08), Max: 37.7 (09 Sep 2021 20:57)  T(F): 98.8 (10 Sep 2021 08:08), Max: 99.8 (09 Sep 2021 20:57)  HR: 85 (10 Sep 2021 11:26) (65 - 98)  BP: 91/48 (10 Sep 2021 09:00) (91/48 - 126/58)  BP(mean): 61 (10 Sep 2021 09:00) (61 - 88)  RR: 14 (10 Sep 2021 09:00) (11 - 25)  SpO2: 98% (10 Sep 2021 11:26) (97% - 100%)    Physical exam:  abd soft pegd  cv s1s2  chest air entry           LABS:                        7.0    5.50  )-----------( 206      ( 10 Sep 2021 08:17 )             24.1     09-10    140  |  109<H>  |  19  ----------------------------<  185<H>  4.3   |  24  |  1.08    Ca    8.4      10 Sep 2021 08:17  Phos  2.0     09-09  Mg     1.7     09-10    TPro  6.0  /  Alb  2.0<L>  /  TBili  0.4  /  DBili  x   /  AST  39  /  ALT  36  /  AlkPhos  72  09-10    PT/INR - ( 09 Sep 2021 07:04 )   PT: 12.4 sec;   INR: 1.03 ratio               RADIOLOGY & ADDITIONAL TESTS:

## 2021-09-10 NOTE — PROGRESS NOTE ADULT - SUBJECTIVE AND OBJECTIVE BOX
Date/Time Patient Seen:  		  Referring MD:   Data Reviewed	       Patient is a 75y old  Female who presents with a chief complaint of Unresponsive, post-cardiac arrest (09 Sep 2021 22:41)      Subjective/HPI     PAST MEDICAL & SURGICAL HISTORY:  Dementia of frontal lobe type    Aphasic stroke    Diabetes mellitus    Respiratory failure    Hypertension    GERD (gastroesophageal reflux disease)    Constipation    Respiratory failure    CVA (cerebral vascular accident)    HTN (hypertension)    DM (diabetes mellitus)    Advanced dementia    COVID-19 virus detected    Quadriplegia    Pneumonia    Type II diabetes mellitus    Hx of appendectomy    Gastrostomy in place    Tracheostomy in place    Tracheostomy tube present    Feeding by G-tube          Medication list         MEDICATIONS  (STANDING):  albuterol/ipratropium for Nebulization 3 milliLiter(s) Nebulizer every 6 hours  aspirin  chewable 81 milliGRAM(s) Enteral Tube daily  bisacodyl Suppository 10 milliGRAM(s) Rectal at bedtime  chlorhexidine 0.12% Liquid 15 milliLiter(s) Oral Mucosa two times a day  chlorhexidine 2% Cloths 1 Application(s) Topical daily  dextrose 40% Gel 15 Gram(s) Oral once  dextrose 5%. 1000 milliLiter(s) (50 mL/Hr) IV Continuous <Continuous>  dextrose 5%. 1000 milliLiter(s) (100 mL/Hr) IV Continuous <Continuous>  dextrose 50% Injectable 12.5 Gram(s) IV Push once  dextrose 50% Injectable 25 Gram(s) IV Push once  dextrose 50% Injectable 25 Gram(s) IV Push once  fentaNYL   Patch  12 MICROgram(s)/Hr 1 Patch Transdermal every 72 hours  fluconAZOLE   Tablet 100 milliGRAM(s) Oral daily  glucagon  Injectable 1 milliGRAM(s) IntraMuscular once  insulin glargine Injectable (LANTUS) 36 Unit(s) SubCutaneous at bedtime  insulin lispro (ADMELOG) corrective regimen sliding scale   SubCutaneous every 6 hours  lactated ringers. 1000 milliLiter(s) (75 mL/Hr) IV Continuous <Continuous>  lactobacillus acidophilus 2 Tablet(s) Oral daily  LORazepam   Injectable 0.5 milliGRAM(s) IV Push three times a day  norepinephrine Infusion 0.05 MICROgram(s)/kG/Min (2.77 mL/Hr) IV Continuous <Continuous>  pantoprazole  Injectable 40 milliGRAM(s) IV Push two times a day  piperacillin/tazobactam IVPB.. 3.375 Gram(s) IV Intermittent every 12 hours  polyethylene glycol 3350 17 Gram(s) Oral daily  senna 2 Tablet(s) Oral at bedtime  simethicone 80 milliGRAM(s) Chew every 6 hours  sucralfate suspension 1 Gram(s) Oral four times a day    MEDICATIONS  (PRN):  acetaminophen   Tablet .. 650 milliGRAM(s) Oral every 6 hours PRN Temp greater or equal to 38C (100.4F), Mild Pain (1 - 3)  sodium chloride 0.9% lock flush 10 milliLiter(s) IV Push every 1 hour PRN Pre/post blood products, medications, blood draw, and to maintain line patency         Vitals log        ICU Vital Signs Last 24 Hrs  T(C): 37.3 (10 Sep 2021 04:00), Max: 37.7 (09 Sep 2021 08:30)  T(F): 99.2 (10 Sep 2021 04:00), Max: 99.9 (09 Sep 2021 12:00)  HR: 73 (10 Sep 2021 06:14) (65 - 135)  BP: 111/57 (10 Sep 2021 00:00) (97/52 - 196/48)  BP(mean): 73 (10 Sep 2021 00:00) (65 - 90)  ABP: --  ABP(mean): --  RR: 25 (10 Sep 2021 00:00) (11 - 50)  SpO2: 100% (10 Sep 2021 06:14) (97% - 100%)       Mode: AC/ CMV (Assist Control/ Continuous Mandatory Ventilation)  RR (machine): 14  TV (machine): 400  FiO2: 30  PEEP: 5  ITime: 1  MAP: 11  PIP: 31      Input and Output:  I&O's Detail    08 Sep 2021 07:01  -  09 Sep 2021 07:00  --------------------------------------------------------  IN:    Enteral Tube Flush: 300 mL    Glucerna 1.5: 590 mL    IV PiggyBack: 200 mL    Lactated Ringers: 1800 mL    Norepinephrine: 89.7 mL  Total IN: 2979.7 mL    OUT:    Indwelling Catheter - Urethral (mL): 1100 mL  Total OUT: 1100 mL    Total NET: 1879.7 mL      09 Sep 2021 07:01  -  10 Sep 2021 06:41  --------------------------------------------------------  IN:    Enteral Tube Flush: 125 mL    IV PiggyBack: 650 mL    Lactated Ringers: 675 mL    TwoCal HN: 100 mL  Total IN: 1550 mL    OUT:    Indwelling Catheter - Urethral (mL): 600 mL    Norepinephrine: 0 mL  Total OUT: 600 mL    Total NET: 950 mL          Lab Data                        7.3    7.69  )-----------( 222      ( 09 Sep 2021 15:06 )             24.5     09-09    142  |  107  |  37<H>  ----------------------------<  152<H>  3.1<L>   |  29  |  1.24    Ca    8.1<L>      09 Sep 2021 07:04  Phos  2.0     09-09  Mg     2.5     09-09    TPro  6.0  /  Alb  2.0<L>  /  TBili  0.3  /  DBili  x   /  AST  61<H>  /  ALT  49  /  AlkPhos  80  09-09      CARDIAC MARKERS ( 09 Sep 2021 07:04 )  .035 ng/mL / x     / x     / x     / x      CARDIAC MARKERS ( 08 Sep 2021 06:55 )  .091 ng/mL / x     / x     / x     / x            Review of Systems	      Objective     Physical Examination    heart s1s2  lung dec BS  abd soft      Pertinent Lab findings & Imaging      Annalise:  NO   Adequate UO     I&O's Detail    08 Sep 2021 07:01  -  09 Sep 2021 07:00  --------------------------------------------------------  IN:    Enteral Tube Flush: 300 mL    Glucerna 1.5: 590 mL    IV PiggyBack: 200 mL    Lactated Ringers: 1800 mL    Norepinephrine: 89.7 mL  Total IN: 2979.7 mL    OUT:    Indwelling Catheter - Urethral (mL): 1100 mL  Total OUT: 1100 mL    Total NET: 1879.7 mL      09 Sep 2021 07:01  -  10 Sep 2021 06:41  --------------------------------------------------------  IN:    Enteral Tube Flush: 125 mL    IV PiggyBack: 650 mL    Lactated Ringers: 675 mL    TwoCal HN: 100 mL  Total IN: 1550 mL    OUT:    Indwelling Catheter - Urethral (mL): 600 mL    Norepinephrine: 0 mL  Total OUT: 600 mL    Total NET: 950 mL               Discussed with:     Cultures:	        Radiology

## 2021-09-10 NOTE — PROGRESS NOTE ADULT - SUBJECTIVE AND OBJECTIVE BOX
[facility]    Patient is a 75y old  Female who presents with a chief complaint of Unresponsive, post-cardiac arrest (10 Sep 2021 06:41)       Allergies    codeine (Hives)    Intolerances        HPI:  76 y/o f trach dependent, with PEG tube, DM, HTN, GERD, CVA, dementia, constipation p/w being found unresponsive at [facility], brought in by EMS for post cardiac arrest care.       In ED, pt was given IVF NS bolus, was foudn to be hypotensive, and started on levophed for pressor support, and CT showed signs of aspiration PNA, an dpt was started on IV zosyn.     (07 Sep 2021 12:19)      PAST MEDICAL & SURGICAL HISTORY:  Dementia of frontal lobe type    Aphasic stroke    Diabetes mellitus    Respiratory failure    Hypertension    GERD (gastroesophageal reflux disease)    Constipation    Respiratory failure    CVA (cerebral vascular accident)    HTN (hypertension)    DM (diabetes mellitus)    Advanced dementia    COVID-19 virus detected    Quadriplegia    Pneumonia    Type II diabetes mellitus    Hx of appendectomy    Gastrostomy in place    Tracheostomy in place    Tracheostomy tube present    Feeding by G-tube        FAMILY HISTORY:  No pertinent family history in first degree relatives          MEDICATIONS   acetaminophen   Tablet .. 650 milliGRAM(s) Oral every 6 hours PRN  albuterol/ipratropium for Nebulization 3 milliLiter(s) Nebulizer every 6 hours  aspirin  chewable 81 milliGRAM(s) Enteral Tube daily  bisacodyl Suppository 10 milliGRAM(s) Rectal at bedtime  chlorhexidine 0.12% Liquid 15 milliLiter(s) Oral Mucosa two times a day  chlorhexidine 2% Cloths 1 Application(s) Topical daily  dextrose 40% Gel 15 Gram(s) Oral once  dextrose 5%. 1000 milliLiter(s) IV Continuous <Continuous>  dextrose 5%. 1000 milliLiter(s) IV Continuous <Continuous>  dextrose 50% Injectable 25 Gram(s) IV Push once  dextrose 50% Injectable 12.5 Gram(s) IV Push once  dextrose 50% Injectable 25 Gram(s) IV Push once  fentaNYL   Patch  12 MICROgram(s)/Hr 1 Patch Transdermal every 72 hours  fluconAZOLE   Tablet 100 milliGRAM(s) Oral daily  glucagon  Injectable 1 milliGRAM(s) IntraMuscular once  insulin glargine Injectable (LANTUS) 36 Unit(s) SubCutaneous at bedtime  insulin lispro (ADMELOG) corrective regimen sliding scale   SubCutaneous every 6 hours  lactated ringers. 1000 milliLiter(s) IV Continuous <Continuous>  lactobacillus acidophilus 2 Tablet(s) Oral daily  LORazepam   Injectable 0.5 milliGRAM(s) IV Push three times a day  norepinephrine Infusion 0.05 MICROgram(s)/kG/Min IV Continuous <Continuous>  pantoprazole  Injectable 40 milliGRAM(s) IV Push two times a day  piperacillin/tazobactam IVPB.. 3.375 Gram(s) IV Intermittent every 12 hours  polyethylene glycol 3350 17 Gram(s) Oral daily  senna 2 Tablet(s) Oral at bedtime  simethicone 80 milliGRAM(s) Chew every 6 hours  sodium chloride 0.9% lock flush 10 milliLiter(s) IV Push every 1 hour PRN  sucralfate suspension 1 Gram(s) Oral four times a day      Vital Signs Last 24 Hrs  T(C): 37.1 (10 Sep 2021 08:08), Max: 37.7 (09 Sep 2021 12:00)  T(F): 98.8 (10 Sep 2021 08:08), Max: 99.9 (09 Sep 2021 12:00)  HR: 65 (10 Sep 2021 09:00) (65 - 135)  BP: 91/48 (10 Sep 2021 09:00) (91/48 - 196/48)  BP(mean): 61 (10 Sep 2021 09:00) (61 - 90)  RR: 14 (10 Sep 2021 09:00) (11 - 50)  SpO2: 100% (10 Sep 2021 09:00) (97% - 100%)      09-09-21 @ 07:01  -  09-10-21 @ 07:00  --------------------------------------------------------  IN: 3190 mL / OUT: 950 mL / NET: 2240 mL        Mode: AC/ CMV (Assist Control/ Continuous Mandatory Ventilation), RR (machine): 14, TV (machine): 400, FiO2: 30, PEEP: 5, ITime: 1, MAP: 11, PIP: 31    LABS:                        7.0    5.50  )-----------( 206      ( 10 Sep 2021 08:17 )             24.1     09-10    140  |  109<H>  |  19  ----------------------------<  185<H>  4.3   |  24  |  1.08    Ca    8.4      10 Sep 2021 08:17  Phos  2.0     09-09  Mg     1.7     09-10    TPro  6.0  /  Alb  2.0<L>  /  TBili  0.4  /  DBili  x   /  AST  39  /  ALT  36  /  AlkPhos  72  09-10    PT/INR - ( 09 Sep 2021 07:04 )   PT: 12.4 sec;   INR: 1.03 ratio                   WBC:  WBC Count: 5.50 K/uL (09-10 @ 08:17)  WBC Count: 7.69 K/uL (09-09 @ 15:06)  WBC Count: 4.84 K/uL (09-09 @ 07:04)  WBC Count: 7.65 K/uL (09-08 @ 09:03)  WBC Count: 8.44 K/uL (09-08 @ 06:55)  WBC Count: 26.45 K/uL (09-07 @ 10:38)      MICROBIOLOGY:  RECENT CULTURES:  09-08 .Sputum Sputum XXXX   Moderate polymorphonuclear leukocytes per low power field  Few Squamous epithelial cells per low power field  Moderate Gram Positive Rods per oil power field  Moderate Gram Negative Rods per oil power field  Few Yeast like cells per oil power field  Moderate Gram Negative Diplococci per oil power field   Numerous Serratia marcescens  Numerous Pseudomonas aeruginosa  Normal Respiratory Elvira present    09-07 .Blood Blood-Peripheral Proteus mirabilis XXXX   No growth to date.          CARDIAC MARKERS ( 09 Sep 2021 07:04 )  .035 ng/mL / x     / x     / x     / x            PT/INR - ( 09 Sep 2021 07:04 )   PT: 12.4 sec;   INR: 1.03 ratio             Sodium:  Sodium, Serum: 140 mmol/L (09-10 @ 08:17)  Sodium, Serum: 142 mmol/L (09-09 @ 07:04)  Sodium, Serum: 141 mmol/L (09-08 @ 06:55)  Sodium, Serum: 140 mmol/L (09-07 @ 10:56)      1.08 mg/dL 09-10 @ 08:17  1.24 mg/dL 09-09 @ 07:04  1.61 mg/dL 09-08 @ 06:55  2.58 mg/dL 09-07 @ 10:56      Hemoglobin:  Hemoglobin: 7.0 g/dL (09-10 @ 08:17)  Hemoglobin: 7.3 g/dL (09-09 @ 15:06)  Hemoglobin: 7.0 g/dL (09-09 @ 07:04)  Hemoglobin: 7.7 g/dL (09-08 @ 09:03)  Hemoglobin: 7.6 g/dL (09-08 @ 06:55)  Hemoglobin: 11.6 g/dL (09-07 @ 10:38)      Platelets: Platelet Count - Automated: 206 K/uL (09-10 @ 08:17)  Platelet Count - Automated: 222 K/uL (09-09 @ 15:06)  Platelet Count - Automated: 226 K/uL (09-09 @ 07:04)  Platelet Count - Automated: 289 K/uL (09-08 @ 09:03)  Platelet Count - Automated: 284 K/uL (09-08 @ 06:55)  Platelet Count - Automated: 362 K/uL (09-07 @ 10:38)      LIVER FUNCTIONS - ( 10 Sep 2021 08:17 )  Alb: 2.0 g/dL / Pro: 6.0 g/dL / ALK PHOS: 72 U/L / ALT: 36 U/L DA / AST: 39 U/L / GGT: x                 RADIOLOGY & ADDITIONAL STUDIES:

## 2021-09-10 NOTE — PROGRESS NOTE ADULT - ASSESSMENT
The patient is a 75 year old female with a history of HTN, DM, CVA, dementia, chronic respiratory failure s/p trach, PEG who is admitted after cardiac arrest.     Plan:  - ECG with RBBB and nonspecific findings  - Troponin mildly elevated at 0.127 in the setting of demand ischemia from cardiac arrest, septic shock  - Echo with normal LV systolic function, mild pulm HTN  - Off of pressors  - Continue aspirin 81 mg daily  - Monitor hemoglobin - may need transfusion  - On zosyn, fluconazole  - Mechanical ventilation  - Overall prognosis very poor

## 2021-09-10 NOTE — PROGRESS NOTE ADULT - ASSESSMENT
74 y/o F with a h/o VDRF, PEG, functional quadriplegia/total care patient, DM2, HTN, GERD, CVA, dementia, with cardiac arrest, shock, sepsis, aspiration pneumonia, UTI, perionychia.    - cause of cardiac arrest unclear, abnormal movements are chronic in nature according to patient's , nonepileptiform on prior EEGs  - neurology input appreciated  - weaned from norepinephrine infusion, will d/c  - monitor hemodynamics closely and maintain a MAP > 65  - continue Zosyn, pseudomonas and serratia growing in sputum culture and proteus in urine culture  - linezolid added for new finding of perionychia, podiatry eval pending  - titrate vent settings to maintain SpO2 > 92%, currently tolerating 30% FiO2 and 5 of PEEP

## 2021-09-10 NOTE — PROGRESS NOTE ADULT - ASSESSMENT
74 y/o f trach dependent, with PEG tube, DM, HTN, GERD, CVA, dementia, constipation p/w being found unresponsive at [facility], brought in by EMS for post cardiac arrest care, found to have sepsis, b/l aspiration PNA, and mild colonic ileus 75F Chronic respiratory failure trach/vent dependent, with PEG tube, functional quadriplegia/total care patient, DM2, HTN, GERD, CVA, dementia, constipation p/w being found unresponsive at [facility], brought in by EMS for post cardiac arrest care, found to have sepsis, b/l aspiration PNA, and mild colonic ileus

## 2021-09-10 NOTE — PROGRESS NOTE ADULT - ASSESSMENT
- cardiac arrest/pneumonia    anemia, guaiac +     Lft elevatoin     proctitis w/ colonic ileus (mild)      reflux    - monitor stools re signs for bleeding, no egd planned     IV Protonix BId. No plan for scope.        monitor Lft-likely mild elevation component of ischemic hep     trend daily       avoid hepatotoxic meds

## 2021-09-10 NOTE — PROGRESS NOTE ADULT - SUBJECTIVE AND OBJECTIVE BOX
Patient is a 75y old  Female who presents with a chief complaint of Unresponsive, post-cardiac arrest (10 Sep 2021 15:54)      BRIEF HOSPITAL COURSE: 74 y/o F with a h/o VDRF, PEG, functional quadriplegia/total care patient, DM2, HTN, GERD, CVA, dementia, admitted on 9/7 s/p cardiac arrest at NH. Was found to be in shock state requiring IV vasopressor. Hospital course complicated by sepsis, proteus UTI, and aspiration PNA.      Events last 24 hours: Patient has been weaned off IV vasopressor. Currently on minimal vent settings. Noted to have left great toe inflammation and now being treated for perionychia.        PAST MEDICAL & SURGICAL HISTORY:  Dementia of frontal lobe type    Aphasic stroke    Diabetes mellitus    Respiratory failure    Hypertension    GERD (gastroesophageal reflux disease)    Constipation    Respiratory failure    CVA (cerebral vascular accident)    HTN (hypertension)    DM (diabetes mellitus)    Advanced dementia    COVID-19 virus detected    Quadriplegia    Pneumonia    Type II diabetes mellitus    Hx of appendectomy    Gastrostomy in place    Tracheostomy in place    Tracheostomy tube present    Feeding by G-tube        Review of Systems:  Unable to obtain secondary to AMS      Medications:  fluconAZOLE   Tablet 100 milliGRAM(s) Oral daily  linezolid    Tablet 600 milliGRAM(s) Oral every 12 hours  piperacillin/tazobactam IVPB.. 3.375 Gram(s) IV Intermittent every 12 hours  albuterol/ipratropium for Nebulization 3 milliLiter(s) Nebulizer every 6 hours  acetaminophen   Tablet .. 650 milliGRAM(s) Oral every 6 hours PRN  fentaNYL   Patch  12 MICROgram(s)/Hr 1 Patch Transdermal every 72 hours  LORazepam   Injectable 1 milliGRAM(s) IV Push every 2 hours PRN  LORazepam   Injectable 0.5 milliGRAM(s) IV Push three times a day  aspirin  chewable 81 milliGRAM(s) Enteral Tube daily  bisacodyl Suppository 10 milliGRAM(s) Rectal at bedtime  pantoprazole  Injectable 40 milliGRAM(s) IV Push two times a day  polyethylene glycol 3350 17 Gram(s) Oral daily  senna 2 Tablet(s) Oral at bedtime  simethicone 80 milliGRAM(s) Chew every 6 hours  sucralfate suspension 1 Gram(s) Oral four times a day  dextrose 40% Gel 15 Gram(s) Oral once  dextrose 50% Injectable 25 Gram(s) IV Push once  dextrose 50% Injectable 12.5 Gram(s) IV Push once  dextrose 50% Injectable 25 Gram(s) IV Push once  glucagon  Injectable 1 milliGRAM(s) IntraMuscular once  insulin glargine Injectable (LANTUS) 36 Unit(s) SubCutaneous at bedtime  insulin lispro (ADMELOG) corrective regimen sliding scale   SubCutaneous every 6 hours  dextrose 5%. 1000 milliLiter(s) IV Continuous <Continuous>  dextrose 5%. 1000 milliLiter(s) IV Continuous <Continuous>  iron sucrose Injectable 100 milliGRAM(s) IV Push every 24 hours  lactated ringers. 1000 milliLiter(s) IV Continuous <Continuous>  sodium chloride 0.9% lock flush 10 milliLiter(s) IV Push every 1 hour PRN  chlorhexidine 0.12% Liquid 15 milliLiter(s) Oral Mucosa two times a day  chlorhexidine 2% Cloths 1 Application(s) Topical daily  lactobacillus acidophilus 2 Tablet(s) Oral daily      Mode: AC/ CMV (Assist Control/ Continuous Mandatory Ventilation)  RR (machine): 14  TV (machine): 400  FiO2: 30  PEEP: 5  ITime: 1  MAP: 11  PIP: 23      ICU Vital Signs Last 24 Hrs  T(C): 36.2 (10 Sep 2021 21:21), Max: 37.3 (10 Sep 2021 00:00)  T(F): 97.2 (10 Sep 2021 21:21), Max: 99.2 (10 Sep 2021 04:00)  HR: 86 (10 Sep 2021 19:00) (65 - 118)  BP: 145/62 (10 Sep 2021 19:00) (83/46 - 152/57)  BP(mean): 79 (10 Sep 2021 19:00) (58 - 88)  ABP: --  ABP(mean): --  RR: 25 (10 Sep 2021 19:00) (14 - 29)  SpO2: 100% (10 Sep 2021 19:00) (95% - 100%)          I&O's Detail    09 Sep 2021 07:01  -  10 Sep 2021 07:00  --------------------------------------------------------  IN:    Enteral Tube Flush: 425 mL    IV PiggyBack: 750 mL    Lactated Ringers: 1575 mL    TwoCal HN: 440 mL  Total IN: 3190 mL    OUT:    Indwelling Catheter - Urethral (mL): 600 mL    Norepinephrine: 0 mL    Voided (mL): 350 mL  Total OUT: 950 mL    Total NET: 2240 mL      10 Sep 2021 07:01  -  10 Sep 2021 22:24  --------------------------------------------------------  IN:  Total IN: 0 mL    OUT:    Voided (mL): 300 mL  Total OUT: 300 mL    Total NET: -300 mL            LABS:                        7.0    5.50  )-----------( 206      ( 10 Sep 2021 08:17 )             24.1     09-10    140  |  109<H>  |  19  ----------------------------<  185<H>  4.3   |  24  |  1.08    Ca    8.4      10 Sep 2021 08:17  Phos  2.0     09-09  Mg     1.7     09-10    TPro  6.0  /  Alb  2.0<L>  /  TBili  0.4  /  DBili  x   /  AST  39  /  ALT  36  /  AlkPhos  72  09-10      CARDIAC MARKERS ( 09 Sep 2021 07:04 )  .035 ng/mL / x     / x     / x     / x          CAPILLARY BLOOD GLUCOSE      POCT Blood Glucose.: 158 mg/dL (10 Sep 2021 21:45)    PT/INR - ( 09 Sep 2021 07:04 )   PT: 12.4 sec;   INR: 1.03 ratio             CULTURES:  Culture Results:   Numerous Serratia marcescens  Numerous Pseudomonas aeruginosa  Normal Respiratory Elvira present (09-08-21 @ 13:17)  Culture Results:   No growth to date. (09-07-21 @ 15:14)  Culture Results:   No growth to date. (09-07-21 @ 15:14)  Culture Results:   >100,000 CFU/ml Proteus mirabilis (09-07-21 @ 15:14)  Rapid RVP Result: NotDetec (09-07-21 @ 12:40)        Physical Examination:    General: No acute distress.  trach to vent, contracted UEs    HEENT: Pupils equal, reactive to light.  Symmetric.    PULM: Clear to auscultation bilaterally, no significant sputum production    CVS: Regular rate and rhythm, no murmurs, rubs, or gallops    ABD: Soft, nondistended, nontender, normoactive bowel sounds, no masses    EXT: left great toe edematous, darkened regions    SKIN: Warm and well perfused, no rashes noted.    NEURO: awake, does not follow commands, some jerking movements        RADIOLOGY:     < from: Xray Chest 1 View- PORTABLE-Routine (Xray Chest 1 View- PORTABLE-Routine in AM.) (09.09.21 @ 08:21) >  Frontal expiratory view of the chest shows the heart angel normal in size. Tracheostomy tube and left jugular line are again noted.    The lungs show questionable right base infiltrate and there is no evidence of pneumothorax nor pleural effusion.    IMPRESSION:  Questionable right infiltrate. Follow-up study is recommended as clinically warranted.

## 2021-09-10 NOTE — PHARMACOTHERAPY INTERVENTION NOTE - COMMENTS
Antimicrobial Stewardship Program  ASP: restricted antibiotic   CrCl = 34.4ml/min  Linezolid 600mg po q12h  ID physician on case: Dr. Arturo Olivas
CrCl=16.8, recommended renal dosing. MD vines to change Lovenox from 40 mg to 30 mg.

## 2021-09-11 DIAGNOSIS — E11.621 TYPE 2 DIABETES MELLITUS WITH FOOT ULCER: ICD-10-CM

## 2021-09-11 DIAGNOSIS — E83.39 OTHER DISORDERS OF PHOSPHORUS METABOLISM: ICD-10-CM

## 2021-09-11 LAB
ALBUMIN SERPL ELPH-MCNC: 2.2 G/DL — LOW (ref 3.3–5)
ALP SERPL-CCNC: 86 U/L — SIGNIFICANT CHANGE UP (ref 30–120)
ALT FLD-CCNC: 45 U/L DA — SIGNIFICANT CHANGE UP (ref 10–60)
ANION GAP SERPL CALC-SCNC: 14 MMOL/L — SIGNIFICANT CHANGE UP (ref 5–17)
AST SERPL-CCNC: 38 U/L — SIGNIFICANT CHANGE UP (ref 10–40)
BILIRUB SERPL-MCNC: 0.3 MG/DL — SIGNIFICANT CHANGE UP (ref 0.2–1.2)
BUN SERPL-MCNC: 18 MG/DL — SIGNIFICANT CHANGE UP (ref 7–23)
CALCIUM SERPL-MCNC: 8.8 MG/DL — SIGNIFICANT CHANGE UP (ref 8.4–10.5)
CHLORIDE SERPL-SCNC: 107 MMOL/L — SIGNIFICANT CHANGE UP (ref 96–108)
CO2 SERPL-SCNC: 20 MMOL/L — LOW (ref 22–31)
CREAT SERPL-MCNC: 1.12 MG/DL — SIGNIFICANT CHANGE UP (ref 0.5–1.3)
GLUCOSE BLDC GLUCOMTR-MCNC: 115 MG/DL — HIGH (ref 70–99)
GLUCOSE BLDC GLUCOMTR-MCNC: 160 MG/DL — HIGH (ref 70–99)
GLUCOSE BLDC GLUCOMTR-MCNC: 161 MG/DL — HIGH (ref 70–99)
GLUCOSE BLDC GLUCOMTR-MCNC: 169 MG/DL — HIGH (ref 70–99)
GLUCOSE SERPL-MCNC: 132 MG/DL — HIGH (ref 70–99)
HCT VFR BLD CALC: 27.8 % — LOW (ref 34.5–45)
HGB BLD-MCNC: 8.3 G/DL — LOW (ref 11.5–15.5)
INR BLD: 1.05 RATIO — SIGNIFICANT CHANGE UP (ref 0.88–1.16)
MAGNESIUM SERPL-MCNC: 1.7 MG/DL — SIGNIFICANT CHANGE UP (ref 1.6–2.6)
MCHC RBC-ENTMCNC: 26.6 PG — LOW (ref 27–34)
MCHC RBC-ENTMCNC: 29.9 GM/DL — LOW (ref 32–36)
MCV RBC AUTO: 89.1 FL — SIGNIFICANT CHANGE UP (ref 80–100)
NRBC # BLD: 0 /100 WBCS — SIGNIFICANT CHANGE UP (ref 0–0)
PHOSPHATE SERPL-MCNC: 2 MG/DL — LOW (ref 2.5–4.5)
PLATELET # BLD AUTO: 247 K/UL — SIGNIFICANT CHANGE UP (ref 150–400)
POTASSIUM SERPL-MCNC: 4.4 MMOL/L — SIGNIFICANT CHANGE UP (ref 3.5–5.3)
POTASSIUM SERPL-SCNC: 4.4 MMOL/L — SIGNIFICANT CHANGE UP (ref 3.5–5.3)
PROCALCITONIN SERPL-MCNC: 2.13 NG/ML — HIGH (ref 0.02–0.1)
PROT SERPL-MCNC: 6.7 G/DL — SIGNIFICANT CHANGE UP (ref 6–8.3)
PROTHROM AB SERPL-ACNC: 12.7 SEC — SIGNIFICANT CHANGE UP (ref 10.6–13.6)
RBC # BLD: 3.12 M/UL — LOW (ref 3.8–5.2)
RBC # FLD: 16.5 % — HIGH (ref 10.3–14.5)
SODIUM SERPL-SCNC: 141 MMOL/L — SIGNIFICANT CHANGE UP (ref 135–145)
WBC # BLD: 7.52 K/UL — SIGNIFICANT CHANGE UP (ref 3.8–10.5)
WBC # FLD AUTO: 7.52 K/UL — SIGNIFICANT CHANGE UP (ref 3.8–10.5)

## 2021-09-11 PROCEDURE — 73630 X-RAY EXAM OF FOOT: CPT | Mod: 26,LT

## 2021-09-11 PROCEDURE — 99233 SBSQ HOSP IP/OBS HIGH 50: CPT

## 2021-09-11 RX ORDER — METHOCARBAMOL 500 MG/1
250 TABLET, FILM COATED ORAL DAILY
Refills: 0 | Status: DISCONTINUED | OUTPATIENT
Start: 2021-09-11 | End: 2021-09-15

## 2021-09-11 RX ORDER — SODIUM,POTASSIUM PHOSPHATES 278-250MG
1 POWDER IN PACKET (EA) ORAL
Refills: 0 | Status: COMPLETED | OUTPATIENT
Start: 2021-09-11 | End: 2021-09-13

## 2021-09-11 RX ADMIN — SIMETHICONE 80 MILLIGRAM(S): 80 TABLET, CHEWABLE ORAL at 11:58

## 2021-09-11 RX ADMIN — Medication 0.5 MILLIGRAM(S): at 13:39

## 2021-09-11 RX ADMIN — LINEZOLID 600 MILLIGRAM(S): 600 INJECTION, SOLUTION INTRAVENOUS at 17:20

## 2021-09-11 RX ADMIN — Medication 10 MILLIGRAM(S): at 22:17

## 2021-09-11 RX ADMIN — Medication 3 MILLILITER(S): at 19:26

## 2021-09-11 RX ADMIN — PIPERACILLIN AND TAZOBACTAM 25 GRAM(S): 4; .5 INJECTION, POWDER, LYOPHILIZED, FOR SOLUTION INTRAVENOUS at 02:57

## 2021-09-11 RX ADMIN — Medication 1 GRAM(S): at 11:58

## 2021-09-11 RX ADMIN — LINEZOLID 600 MILLIGRAM(S): 600 INJECTION, SOLUTION INTRAVENOUS at 05:10

## 2021-09-11 RX ADMIN — Medication 3 MILLILITER(S): at 00:08

## 2021-09-11 RX ADMIN — METHOCARBAMOL 250 MILLIGRAM(S): 500 TABLET, FILM COATED ORAL at 11:59

## 2021-09-11 RX ADMIN — PANTOPRAZOLE SODIUM 40 MILLIGRAM(S): 20 TABLET, DELAYED RELEASE ORAL at 05:10

## 2021-09-11 RX ADMIN — Medication 1 GRAM(S): at 23:21

## 2021-09-11 RX ADMIN — SIMETHICONE 80 MILLIGRAM(S): 80 TABLET, CHEWABLE ORAL at 05:10

## 2021-09-11 RX ADMIN — Medication 1 MILLIGRAM(S): at 23:21

## 2021-09-11 RX ADMIN — PIPERACILLIN AND TAZOBACTAM 25 GRAM(S): 4; .5 INJECTION, POWDER, LYOPHILIZED, FOR SOLUTION INTRAVENOUS at 15:02

## 2021-09-11 RX ADMIN — Medication 2: at 05:18

## 2021-09-11 RX ADMIN — Medication 3 MILLILITER(S): at 13:06

## 2021-09-11 RX ADMIN — FENTANYL CITRATE 1 PATCH: 50 INJECTION INTRAVENOUS at 07:28

## 2021-09-11 RX ADMIN — Medication 1 GRAM(S): at 17:20

## 2021-09-11 RX ADMIN — CHLORHEXIDINE GLUCONATE 1 APPLICATION(S): 213 SOLUTION TOPICAL at 12:37

## 2021-09-11 RX ADMIN — Medication 3 MILLILITER(S): at 07:42

## 2021-09-11 RX ADMIN — CHLORHEXIDINE GLUCONATE 15 MILLILITER(S): 213 SOLUTION TOPICAL at 17:19

## 2021-09-11 RX ADMIN — FLUCONAZOLE 100 MILLIGRAM(S): 150 TABLET ORAL at 11:58

## 2021-09-11 RX ADMIN — PANTOPRAZOLE SODIUM 40 MILLIGRAM(S): 20 TABLET, DELAYED RELEASE ORAL at 17:19

## 2021-09-11 RX ADMIN — Medication 0.5 MILLIGRAM(S): at 05:11

## 2021-09-11 RX ADMIN — Medication 1 MILLIGRAM(S): at 15:33

## 2021-09-11 RX ADMIN — CHLORHEXIDINE GLUCONATE 15 MILLILITER(S): 213 SOLUTION TOPICAL at 05:10

## 2021-09-11 RX ADMIN — Medication 2: at 11:59

## 2021-09-11 RX ADMIN — SODIUM CHLORIDE 75 MILLILITER(S): 9 INJECTION, SOLUTION INTRAVENOUS at 02:22

## 2021-09-11 RX ADMIN — Medication 1 MILLIGRAM(S): at 05:09

## 2021-09-11 RX ADMIN — SIMETHICONE 80 MILLIGRAM(S): 80 TABLET, CHEWABLE ORAL at 17:20

## 2021-09-11 RX ADMIN — Medication 2: at 17:48

## 2021-09-11 RX ADMIN — Medication 1 MILLIGRAM(S): at 22:58

## 2021-09-11 RX ADMIN — SENNA PLUS 2 TABLET(S): 8.6 TABLET ORAL at 22:17

## 2021-09-11 RX ADMIN — Medication 0.5 MILLIGRAM(S): at 22:17

## 2021-09-11 RX ADMIN — Medication 1 PACKET(S): at 17:20

## 2021-09-11 RX ADMIN — Medication 2 TABLET(S): at 11:58

## 2021-09-11 RX ADMIN — INSULIN GLARGINE 36 UNIT(S): 100 INJECTION, SOLUTION SUBCUTANEOUS at 23:18

## 2021-09-11 RX ADMIN — Medication 81 MILLIGRAM(S): at 11:58

## 2021-09-11 RX ADMIN — POLYETHYLENE GLYCOL 3350 17 GRAM(S): 17 POWDER, FOR SOLUTION ORAL at 11:58

## 2021-09-11 RX ADMIN — Medication 1 GRAM(S): at 05:11

## 2021-09-11 RX ADMIN — IRON SUCROSE 100 MILLIGRAM(S): 20 INJECTION, SOLUTION INTRAVENOUS at 11:58

## 2021-09-11 RX ADMIN — SIMETHICONE 80 MILLIGRAM(S): 80 TABLET, CHEWABLE ORAL at 23:21

## 2021-09-11 RX ADMIN — FENTANYL CITRATE 1 PATCH: 50 INJECTION INTRAVENOUS at 19:22

## 2021-09-11 NOTE — CONSULT NOTE ADULT - CONSULT REASON
card arrest  resp failure  chr resp failure  OP  OA  Dementia
AMS
dysp
ulceration left hallux with ingrown left hallux nail
Cardiac arrest
diabetic ulceration left hallux

## 2021-09-11 NOTE — CONSULT NOTE ADULT - PROBLEM SELECTOR RECOMMENDATION 9
debridement of ulceration left hallux  partial nail avulsion left hallux   cont IV ABX   ordered radiograph left foot  ordered vascular consultation  cont local wound care   will cont to follow and discuss with all attendings
debridement of ulcer left hallux   partial nail avulsion left hallux tibial and fibular aspects left   cont IV ABX as per ID  ordered radiographs left foot to rule out OM left hallux  ordered vascular consultation   cont local wound care  will cont to follow and discuss with all attendings

## 2021-09-11 NOTE — PROGRESS NOTE ADULT - ASSESSMENT
75F Chronic respiratory failure trach/vent dependent, with PEG tube, functional quadriplegia/total care patient, DM2, HTN, GERD, CVA, dementia, constipation p/w being found unresponsive at [facility], brought in by EMS for post cardiac arrest care, found to have sepsis, b/l aspiration PNA, and mild colonic ileus

## 2021-09-11 NOTE — CONSULT NOTE ADULT - REASON FOR ADMISSION
Unresponsive, post-cardiac arrest

## 2021-09-11 NOTE — CONSULT NOTE ADULT - ASSESSMENT
The patient is a 75 year old female with a history of HTN, DM, CVA, dementia, chronic respiratory failure s/p trach, PEG who is admitted after cardiac arrest.     Plan:  - ECG with RBBB and nonspecific findings  - Troponin mildly elevated at 0.127 in the setting of demand ischemia from cardiac arrest, septic shock  - Echo 6/21 with normal LV systolic function, no significant valve issues  - Will repeat echo given acute changes  - Continue norepinephrine and titrate to MAP of 65  - Continue aspirin 81 mg daily  - Monitor hemoglobin - transfuse if continues to trend down  - On zosyn  - Mechanical ventilation  - Overall prognosis very poor
unresponsive/arrest   - anemia     Lft elevatoin     proctitis w/ colonic ileus (mild)      reflux    - monitor stools re signs for bleeding, no egd planned     IV Protonix BId      CBC in anm  monitor Lft-likely mild elevation component of ischemic hep     trend daily       avoid hepatotoxic meds  supportiv measures   feed enterally via peg
diabetic ulceration left hallux  ingrown left hallux nail 
      CHAPINCITO GUIDRY 75 f [facility] P 9/7/2021   DR ILIANA KEMP     Initial evaluation/Pulmonary Critical Care consultation requested on  9/7/2021 by Dr DALAL  from Dr Sandoval   Patient examined chart reviewed    HOSPITAL ADMISSION   PATIENT CAME  FROM (if information available)      CHAPINCITO GUIDRY 75 f [facility] P 9/7/2021   DR ILIANA KEMP     REVIEW OF SYMPTOMS      Able to give (reliable) ROS  NO     PHYSICAL EXAM    HEENT Unremarkable  atraumatic   RESP Fair air entry EXP prolonged    Harsh breath sound Resp distres mild   CARDIAC S1 S2 No S3     NO JVD    ABDOMEN SOFT BS PRESENT NOT DISTENDED No hepatosplenomegaly   PEDAL EDEMA present No calf tenderness  NO rash        9/7/2021 PATIENT PRESENTATION.  74F PMH Frontotemporal dementia, CVA, chronic vent dependent respiratory failure, dysphagia s/p PEG, recurrent UTI/VAP Past HO sputum Pseudomonas resistant zosyn presents sp cac at [facility] nursing facility on 9/7/2021   In syo er on 9/7/2021 already given 4l ns   Pulm critical care consulted 9/7/2021     RECENT HOSPITAL STAYS   6/15-6/23 [facility] p    6/19/2021 fungitell 44  6/19 caspofungin  10/30-11/6 [facility] P      Home meds poa included meropenem bd duragesic 12 lorazepam 1.3 asa 81 lovnx 40 lantus 40 hs      9/7/2021 PRESENTING PROBLEMS.        SP CAC AT HCF FEW MIN 9/7/2021   POSSIBLE ASP PNEUMONIA poa  SHOCK poa   TRACH POA   VENT DEPENENT POA   PEG POA  DM   [facility] APHASIC STROKE         BEST PRACTICE ISSUES.                                                  HEAD OF BED ELEVATION. Yes  DVT PROPHYLAXIS.  lvnx 30 (9/7/2021)                                        SQUIRES PROPHYLAXIS.   protonix 40 (9/7/2021)       carafate 1.4 (9/7/2021)                                                                                   DIET.     npo 9/7/2021                      INFECTION PROPHYLAXIS.                      GENERAL ISSUES  GOC ADVANCED DIRECTIVE.                                         ALLERGY.                            WEIGHT.         9/7/2021 130                           BMI.                   9/7/2021       PATIENT DATA   TUBES  PROCEDURES.      9/7/2021 Attempted by er md andrews and r subclav unsuccessful   9/7/2021 IO     PEG poa  TRACH poa     ABG.       OXYGENATION.                   VITALS/IO/VENT/IPS.     9/7/2021 55 110/50   9/7/2021 400/14/100/5       PROBLEM ASSESSMENT/RECOMMENDATIONS.    COVID STATUS EVALUATION.  scv2 9/7/2021 (-)  SP CAC AT HCF FEW MIN 9/7/2021.   POSSIBLE ASP PNEUMONIA poa.  w 9/7/2021 w 26   b 9/7/2021 b 10   ua 9/7/2021 w 26-50 bact tntc   ct cap 9/7/2021   basl asp pneum and atelect  fluid gas distn of thoracic esophagus suggesting gerd  mild colon ileus   marked fluid distention rectunm  rvp 9/7/2021 (-)  Diflucan 100 x 5d (9/7/2021)   zosyn x 7d (9/7/2021) ( Gr) (Pneu)        SHOCK poa.   lr 150 x 8 h then ns 75 (9/7/2021)   norepi 9/7/2021 12p  target map 65 (+)  LACTICEMIA  poa.  la 9/7-9/7/2021 la 13.5-3.7   TRACH POA.   VENT DEPENENT POA.   Target pH 730 (+) PO2 60 (+) po 90-95% Pplat 35 (-)   HOBE DSV DSBT  COPD.  duoneb (9/7/2021)     RO MI.   Tr 9/7/2021 tr .17  ASA 81 (9/7/2021)   metoprolol 25.2 (9/7/2021)   RO CHF.   bnp 9/7/2021 bnp 2391   echo 6/15/2021 n lvsf dd1   PEG POA.  GERD.  9/7/2021 ct cap gerd   protonix 40 99/7/2021)                                           ELEVATED LFTS POA.  LFTS 9/7/2021  ap 122  ast 205  alt 81   COLON ILEUS ON CTAP 9/7/2021.   bisacodyl 10 (9/7/2021)   miralax (9/7/2021)   RYAN poa.  Cr 9/7/2021 Cr 2.5  cr 6/14-6/15-6/16-6/19 Cr 1.7-1.3-1.1 - 1.1   lr 150 x 8 h then ns 75 (9/7/2021)   DM.   insulin   PMH APHASIC STROKE.   AC R SPHENOID SINUSITIS 9/7/2021 CT.   ct head 9/7/2021 No ac poss ac r sphenoid sinusitis  PAIN.   fentnyl 12 (9/7/2021)  ANXIETY.   lorazepam 1.3 (9/7/2021)         OVERALL PLAN.  SP CAC C monitor C enz   VENT Vent bndle  ASP PNEUM bsab  RYAN Hydration monitor   SHOCK  target pap 65 (+)        TIME SPENT   Over 55 minutes aggregate critical care time spent on encounter; activities included   direct patient care, counseling and/or coordinating care reviewing notes, lab data/ imaging , discussion with multidisciplinary team/ patient  /family and explaining in detail risks, benefits, alternatives  of the recommendations     CHAPINCITO GUIDRY 75 f [facility] P 9/7/2021   DR ILIANA KEMP     
76 y/o f trach dependent, with PEG tube, DM, HTN, GERD, CVA, dementia, constipation p/w being found unresponsive at [facility], brought in by EMS for post cardiac arrest care.  Noted to be febrile with leukocytosis and bandemia.    RECOMMENDATIONS    Sepsis - pt has vaughn, chronic vent support with trach, and nonverbal so not helping with localization. AGree with broad spectrum abx and will start ZOSYN with recs to follow but high risk for PNA, Pyelo, or abd source.    Thank you for consulting us and involving us in the management of this most interesting and challenging case.  We will follow along in the care of this patient. Please call us at [phone] or text me directly on my cell# at [phone] with any concerns.  
diabetic ulceration left hallux   ingrown left hallux nail

## 2021-09-11 NOTE — PROGRESS NOTE ADULT - SUBJECTIVE AND OBJECTIVE BOX
Patient is a 75y Female with a known history of :  Cardiac arrest [I46.9]    Sepsis [A41.9]    Ileus [K56.7]    Diabetes [E11.9]    HTN (hypertension) [I10]    GERD (gastroesophageal reflux disease) [K21.9]    Need for prophylactic measure [Z29.9]    Anemia due to acute blood loss [D62]    Involuntary jerky movements [R25.8]    Hypophosphatemia [E83.39]    Diabetic foot ulcer [E11.621]      HPI:  74 y/o f trach dependent, with PEG tube, DM, HTN, GERD, CVA, dementia, constipation p/w being found unresponsive at [facility], brought in by EMS for post cardiac arrest care.       In ED, pt was given IVF NS bolus, was foudn to be hypotensive, and started on levophed for pressor support, and CT showed signs of aspiration PNA, an dpt was started on IV zosyn.     (07 Sep 2021 12:19)      REVIEW OF SYSTEMS:    CONSTITUTIONAL: No fever, weight loss, or fatigue  EYES: No eye pain, visual disturbances, or discharge  ENMT:  No difficulty hearing, tinnitus, vertigo; No sinus or throat pain  NECK: No pain or stiffness  BREASTS: No pain, masses, or nipple discharge  RESPIRATORY: No cough, wheezing, chills or hemoptysis; No shortness of breath  CARDIOVASCULAR: No chest pain, palpitations, dizziness, or leg swelling  GASTROINTESTINAL: No abdominal or epigastric pain. No nausea, vomiting, or hematemesis; No diarrhea or constipation. No melena or hematochezia.  GENITOURINARY: No dysuria, frequency, hematuria, or incontinence  NEUROLOGICAL: No headaches, memory loss, loss of strength, numbness, or tremors  SKIN: No itching, burning, rashes, or lesions   LYMPH NODES: No enlarged glands  ENDOCRINE: No heat or cold intolerance; No hair loss  MUSCULOSKELETAL: No joint pain or swelling; No muscle, back, or extremity pain  PSYCHIATRIC: No depression, anxiety, mood swings, or difficulty sleeping  HEME/LYMPH: No easy bruising, or bleeding gums  ALLERGY AND IMMUNOLOGIC: No hives or eczema    MEDICATIONS  (STANDING):  albuterol/ipratropium for Nebulization 3 milliLiter(s) Nebulizer every 6 hours  aspirin  chewable 81 milliGRAM(s) Enteral Tube daily  bisacodyl Suppository 10 milliGRAM(s) Rectal at bedtime  chlorhexidine 0.12% Liquid 15 milliLiter(s) Oral Mucosa two times a day  chlorhexidine 2% Cloths 1 Application(s) Topical daily  dextrose 40% Gel 15 Gram(s) Oral once  dextrose 5%. 1000 milliLiter(s) (50 mL/Hr) IV Continuous <Continuous>  dextrose 5%. 1000 milliLiter(s) (100 mL/Hr) IV Continuous <Continuous>  dextrose 50% Injectable 25 Gram(s) IV Push once  dextrose 50% Injectable 12.5 Gram(s) IV Push once  dextrose 50% Injectable 25 Gram(s) IV Push once  fentaNYL   Patch  12 MICROgram(s)/Hr 1 Patch Transdermal every 72 hours  fluconAZOLE   Tablet 100 milliGRAM(s) Oral daily  glucagon  Injectable 1 milliGRAM(s) IntraMuscular once  insulin glargine Injectable (LANTUS) 36 Unit(s) SubCutaneous at bedtime  insulin lispro (ADMELOG) corrective regimen sliding scale   SubCutaneous every 6 hours  iron sucrose Injectable 100 milliGRAM(s) IV Push every 24 hours  lactated ringers. 1000 milliLiter(s) (75 mL/Hr) IV Continuous <Continuous>  lactobacillus acidophilus 2 Tablet(s) Oral daily  linezolid    Tablet 600 milliGRAM(s) Oral every 12 hours  LORazepam   Injectable 0.5 milliGRAM(s) IV Push three times a day  methocarbamol 250 milliGRAM(s) Oral daily  pantoprazole  Injectable 40 milliGRAM(s) IV Push two times a day  piperacillin/tazobactam IVPB.. 3.375 Gram(s) IV Intermittent every 12 hours  polyethylene glycol 3350 17 Gram(s) Oral daily  potassium phosphate / sodium phosphate Powder (PHOS-NaK) 1 Packet(s) Oral two times a day  senna 2 Tablet(s) Oral at bedtime  simethicone 80 milliGRAM(s) Chew every 6 hours  sucralfate suspension 1 Gram(s) Oral four times a day    MEDICATIONS  (PRN):  acetaminophen   Tablet .. 650 milliGRAM(s) Oral every 6 hours PRN Temp greater or equal to 38C (100.4F), Mild Pain (1 - 3)  LORazepam   Injectable 1 milliGRAM(s) IV Push every 2 hours PRN Agitation  sodium chloride 0.9% lock flush 10 milliLiter(s) IV Push every 1 hour PRN Pre/post blood products, medications, blood draw, and to maintain line patency      ALLERGIES: codeine (Hives)      FAMILY HISTORY:  No pertinent family history in first degree relatives        Social history:  Alochol:   Smoking:   Drug Use:   Marital Status:     PHYSICAL EXAMINATION:  -----------------------------  T(C): 36.8 (09-11-21 @ 12:47), Max: 37.1 (09-10-21 @ 16:10)  HR: 62 (09-11-21 @ 16:00) (62 - 99)  BP: 102/47 (09-11-21 @ 15:00) (83/46 - 145/62)  RR: 16 (09-11-21 @ 15:00) (6 - 47)  SpO2: 98% (09-11-21 @ 16:00) (94% - 100%)  Wt(kg): --    09-10 @ 07:01  -  09-11 @ 07:00  --------------------------------------------------------  IN:    Enteral Tube Flush: 325 mL    IV PiggyBack: 580 mL    Lactated Ringers: 975 mL    TwoCal HN: 520 mL  Total IN: 2400 mL    OUT:    Voided (mL): 1000 mL  Total OUT: 1000 mL    Total NET: 1400 mL      09-11 @ 07:01  -  09-11 @ 16:04  --------------------------------------------------------  IN:    Enteral Tube Flush: 180 mL    IV PiggyBack: 100 mL    Lactated Ringers: 600 mL    TwoCal HN: 320 mL  Total IN: 1200 mL    OUT:  Total OUT: 0 mL    Total NET: 1200 mL            Constitutional: well developed, normal appearance, well groomed, well nourished, no deformities and no acute distress.   Eyes: the conjunctiva exhibited no abnormalities and the eyelids demonstrated no xanthelasmas.   HEENT: normal oral mucosa, no oral pallor and no oral cyanosis.   Neck: normal jugular venous A waves present, normal jugular venous V waves present and no jugular venous obregon A waves.   Pulmonary: no respiratory distress, normal respiratory rhythm and effort, no accessory muscle use and lungs were clear to auscultation bilaterally. Anteriorly  Cardiovascular: heart rate and rhythm were normal, normal S1 and S2 and no murmur, gallop, rub, heave or thrill are present.  Musculoskeletal: the gait could not be assessed.   Extremities: no clubbing of the fingernails, no localized cyanosis, no petechial hemorrhages and no ischemic changes.   Skin: normal skin color and pigmentation, no rash, no venous stasis, no skin lesions, no skin ulcer and no xanthoma was observed.     LABS:   --------  09-11    141  |  107  |  18  ----------------------------<  132<H>  4.4   |  20<L>  |  1.12    Ca    8.8      11 Sep 2021 07:30  Phos  2.0     09-11  Mg     1.7     09-11    TPro  6.7  /  Alb  2.2<L>  /  TBili  0.3  /  DBili  x   /  AST  38  /  ALT  45  /  AlkPhos  86  09-11                         8.3    7.52  )-----------( 247      ( 11 Sep 2021 07:30 )             27.8     PT/INR - ( 11 Sep 2021 07:30 )   PT: 12.7 sec;   INR: 1.05 ratio                       Radiology:    < from: US Transthoracic Echocardiogram w/Doppler Complete (09.08.21 @ 12:16) >    EXAM:  US TTE W DOPPLER COMPLETE                                  PROCEDURE DATE:  09/08/2021          INTERPRETATION:  Indication: cardiac arrest    Technician: Cat Wolf    Study Quality: Technical difficult study    Height 5 feet 4 inches,weight 110 pounds, blood pressure 114/58 mmHg    Measurements: Aortic root size 2.4 cm, left it is at 2 1 8 cm, right atrial 4.1 cm, right ventricular size 2.2 cm, left ventricular end-diastolic diameter 3.7 cm, left ventricular end-systolic diameter2.4 cm, septal wall thickness 1.0 cm, posterior wall thickness 0.8 cm, aortic velocity 1.7 m/s, mitral E velocity 1.1 m/s, ejection fraction approximately 65%.    Mitral Valve: Mildly thickened mitral valve with normal opening. Mild mitral regurgitation  Aortic Valve: Mild fibrocalcific changes with normal cusp excursion.  Left Atrium: Normal size  Left Ventricle: Normal size, normal wall thickness with normal left ventricular systolic function with mild diastolic dysfunction  Pericardium: Small posterior pericardial effusion  Tricuspid Valve: Not well-visualized but appears to be normal with mild tricuspid regurgitation with estimated right ventricular systolic pressure 51 mmHg, IVC size 1.9 cm with less than 50% respiratory variation  Pulmonic Valve: Not well-visualized but appears to be normal  Right Ventricle: Upper limits of normal size with normal right ventricular systolic function  Right Atrium: Mildly enlarged    IMPRESSION:  1. grossly normal overall left ventricular systolic function with mild diastolic dysfunction  2. normal right ventricular systolic function  3. mild tricuspid regurgitation with moderate pulmonary hypertension    --- End of Report ---              [facility]OP[facility]IC[facility]/CARDIOLOGY  This document has been electronically signed. Sep  9 2021  8:14AM    < end of copied text >

## 2021-09-11 NOTE — PROGRESS NOTE ADULT - SUBJECTIVE AND OBJECTIVE BOX
Date/Time Patient Seen:  		  Referring MD:   Data Reviewed	       Patient is a 75y old  Female who presents with a chief complaint of Unresponsive, post-cardiac arrest (10 Sep 2021 22:24)      Subjective/HPI     PAST MEDICAL & SURGICAL HISTORY:  Dementia of frontal lobe type    Aphasic stroke    Diabetes mellitus    Respiratory failure    Hypertension    GERD (gastroesophageal reflux disease)    Constipation    Respiratory failure    CVA (cerebral vascular accident)    HTN (hypertension)    DM (diabetes mellitus)    Advanced dementia    COVID-19 virus detected    Quadriplegia    Pneumonia    Type II diabetes mellitus    Hx of appendectomy    Gastrostomy in place    Tracheostomy in place    Tracheostomy tube present    Feeding by G-tube          Medication list         MEDICATIONS  (STANDING):  albuterol/ipratropium for Nebulization 3 milliLiter(s) Nebulizer every 6 hours  aspirin  chewable 81 milliGRAM(s) Enteral Tube daily  bisacodyl Suppository 10 milliGRAM(s) Rectal at bedtime  chlorhexidine 0.12% Liquid 15 milliLiter(s) Oral Mucosa two times a day  chlorhexidine 2% Cloths 1 Application(s) Topical daily  dextrose 40% Gel 15 Gram(s) Oral once  dextrose 5%. 1000 milliLiter(s) (50 mL/Hr) IV Continuous <Continuous>  dextrose 5%. 1000 milliLiter(s) (100 mL/Hr) IV Continuous <Continuous>  dextrose 50% Injectable 25 Gram(s) IV Push once  dextrose 50% Injectable 12.5 Gram(s) IV Push once  dextrose 50% Injectable 25 Gram(s) IV Push once  fentaNYL   Patch  12 MICROgram(s)/Hr 1 Patch Transdermal every 72 hours  fluconAZOLE   Tablet 100 milliGRAM(s) Oral daily  glucagon  Injectable 1 milliGRAM(s) IntraMuscular once  insulin glargine Injectable (LANTUS) 36 Unit(s) SubCutaneous at bedtime  insulin lispro (ADMELOG) corrective regimen sliding scale   SubCutaneous every 6 hours  iron sucrose Injectable 100 milliGRAM(s) IV Push every 24 hours  lactated ringers. 1000 milliLiter(s) (75 mL/Hr) IV Continuous <Continuous>  lactobacillus acidophilus 2 Tablet(s) Oral daily  linezolid    Tablet 600 milliGRAM(s) Oral every 12 hours  LORazepam   Injectable 0.5 milliGRAM(s) IV Push three times a day  pantoprazole  Injectable 40 milliGRAM(s) IV Push two times a day  piperacillin/tazobactam IVPB.. 3.375 Gram(s) IV Intermittent every 12 hours  polyethylene glycol 3350 17 Gram(s) Oral daily  senna 2 Tablet(s) Oral at bedtime  simethicone 80 milliGRAM(s) Chew every 6 hours  sucralfate suspension 1 Gram(s) Oral four times a day    MEDICATIONS  (PRN):  acetaminophen   Tablet .. 650 milliGRAM(s) Oral every 6 hours PRN Temp greater or equal to 38C (100.4F), Mild Pain (1 - 3)  LORazepam   Injectable 1 milliGRAM(s) IV Push every 2 hours PRN Agitation  sodium chloride 0.9% lock flush 10 milliLiter(s) IV Push every 1 hour PRN Pre/post blood products, medications, blood draw, and to maintain line patency         Vitals log        ICU Vital Signs Last 24 Hrs  T(C): 36.7 (11 Sep 2021 04:02), Max: 37.1 (10 Sep 2021 08:08)  T(F): 98.1 (11 Sep 2021 04:02), Max: 98.8 (10 Sep 2021 08:08)  HR: 89 (11 Sep 2021 06:09) (65 - 118)  BP: 127/56 (11 Sep 2021 06:09) (83/46 - 152/57)  BP(mean): 77 (11 Sep 2021 06:09) (58 - 86)  ABP: --  ABP(mean): --  RR: 47 (11 Sep 2021 06:09) (9 - 47)  SpO2: 100% (11 Sep 2021 06:09) (94% - 100%)       Mode: AC/ CMV (Assist Control/ Continuous Mandatory Ventilation)  RR (machine): 14  TV (machine): 400  FiO2: 30  PEEP: 5  ITime: 1  MAP: 9  PIP: 22      Input and Output:  I&O's Detail    09 Sep 2021 07:01  -  10 Sep 2021 07:00  --------------------------------------------------------  IN:    Enteral Tube Flush: 425 mL    IV PiggyBack: 750 mL    Lactated Ringers: 1575 mL    TwoCal HN: 440 mL  Total IN: 3190 mL    OUT:    Indwelling Catheter - Urethral (mL): 600 mL    Norepinephrine: 0 mL    Voided (mL): 350 mL  Total OUT: 950 mL    Total NET: 2240 mL      10 Sep 2021 07:01  -  11 Sep 2021 06:21  --------------------------------------------------------  IN:    Enteral Tube Flush: 325 mL    IV PiggyBack: 100 mL    Lactated Ringers: 975 mL    TwoCal HN: 520 mL  Total IN: 1920 mL    OUT:    Voided (mL): 600 mL  Total OUT: 600 mL    Total NET: 1320 mL          Lab Data                        7.0    5.50  )-----------( 206      ( 10 Sep 2021 08:17 )             24.1     09-10    140  |  109<H>  |  19  ----------------------------<  185<H>  4.3   |  24  |  1.08    Ca    8.4      10 Sep 2021 08:17  Phos  2.0     09-09  Mg     1.7     09-10    TPro  6.0  /  Alb  2.0<L>  /  TBili  0.4  /  DBili  x   /  AST  39  /  ALT  36  /  AlkPhos  72  09-10      CARDIAC MARKERS ( 09 Sep 2021 07:04 )  .035 ng/mL / x     / x     / x     / x            Review of Systems	      Objective     Physical Examination    heart s1s2  lung dec BS  abd soft      Pertinent Lab findings & Imaging      Annalise:  NO   Adequate UO     I&O's Detail    09 Sep 2021 07:01  -  10 Sep 2021 07:00  --------------------------------------------------------  IN:    Enteral Tube Flush: 425 mL    IV PiggyBack: 750 mL    Lactated Ringers: 1575 mL    TwoCal HN: 440 mL  Total IN: 3190 mL    OUT:    Indwelling Catheter - Urethral (mL): 600 mL    Norepinephrine: 0 mL    Voided (mL): 350 mL  Total OUT: 950 mL    Total NET: 2240 mL      10 Sep 2021 07:01  -  11 Sep 2021 06:21  --------------------------------------------------------  IN:    Enteral Tube Flush: 325 mL    IV PiggyBack: 100 mL    Lactated Ringers: 975 mL    TwoCal HN: 520 mL  Total IN: 1920 mL    OUT:    Voided (mL): 600 mL  Total OUT: 600 mL    Total NET: 1320 mL               Discussed with:     Cultures:	        Radiology

## 2021-09-11 NOTE — PROGRESS NOTE ADULT - ASSESSMENT
The patient is a 75 year old female with a history of HTN, DM, CVA, dementia, chronic respiratory failure s/p trach, PEG who is admitted after cardiac arrest.     9/11/21  Seen at [facility] ICU   at bedside  Patient sleeping comfortably (given Ativan)  Responds to tactile stimuli  Monitor-NSR    Plan:  - ECG with RBBB and nonspecific findings  - Troponin mildly elevated at 0.127 in the setting of demand ischemia from cardiac arrest, septic shock  - Echo with normal LV systolic function, mild pulm HTN-see above report  - Off of pressors  - Continue aspirin 81 mg daily  - Follow labs  - On zosyn, fluconazole  - Mechanical ventilation  - Being followed by Pulmonary, Neurology, Podiatry, ID, Palliative care  - Overall prognosis very poor

## 2021-09-11 NOTE — CONSULT NOTE ADULT - SUBJECTIVE AND OBJECTIVE BOX
[facility]  Arturo Olivas MD PhD, Haylee Umanzor MD, Andressa Brantley MD, Billy Valenzuela MD  and providing coverage with Alexa Canas MD and Eusebio Chairez MD  Providing Infectious Disease Consultations at [facility], [facility], [facility]    Office# [phone] to schedule follow up appointments  Answering Service for urgent calls or New Consults [phone]  Cell# to text for urgent issues Arturo Olivas [phone]     HPI:  76 y/o f trach dependent, with PEG tube, DM, HTN, GERD, CVA, dementia, constipation p/w being found unresponsive at [facility], brought in by EMS for post cardiac arrest care.  Noted to be febrile with leukocytosis and bandemia.      PAST MEDICAL & SURGICAL HISTORY:  Dementia of frontal lobe type  Aphasic stroke  Diabetes mellitus  Respiratory failure  Hypertension  GERD (gastroesophageal reflux disease)  Constipation  CVA (cerebral vascular accident)  HTN (hypertension)  DM (diabetes mellitus)  Advanced dementia  COVID-19 virus detected  Quadriplegia  Pneumonia    Type II diabetes mellitus    Hx of appendectomy    Gastrostomy in place    Tracheostomy in place    Tracheostomy tube present    Feeding by G-tube        Antimicrobials      Immunological      Other  norepinephrine Infusion 0.05 MICROgram(s)/kG/Min IV Continuous <Continuous>  sodium chloride 0.9% Bolus 2000 milliLiter(s) IV Bolus once  sodium chloride 0.9% Bolus 1000 milliLiter(s) IV Bolus once      Allergies    codeine (Hives)    Intolerances        SOCIAL HISTORY:  Social History:  living at [facility] (07 Sep 2021 12:19)      FAMILY HISTORY:  No pertinent family history in first degree relatives        ROS:    limited w nonverbal status    Vital Signs Last 24 Hrs  T(C): 37.2 (07 Sep 2021 11:31), Max: 38.4 (07 Sep 2021 09:55)  T(F): 99 (07 Sep 2021 11:31), Max: 101.1 (07 Sep 2021 09:55)  HR: 72 (07 Sep 2021 11:31) (72 - 130)  BP: 55/33 (07 Sep 2021 11:31) (55/33 - 184/66)  BP(mean): 36 (07 Sep 2021 11:31) (36 - 79)  RR: 35 (07 Sep 2021 11:31) (35 - 40)  SpO2: 97% (07 Sep 2021 11:31) (97% - 100%)    PE:  WDWN in no distress  HEENT:  NC, PERRL, sclerae anicteric, conjunctivae clear, EOMI.  Sinuses nontender, no nasal exudate.  No buccal or pharyngeal lesions, erythema or exudate  Neck:  Supple, no adenopathy, trach Intubated  Lungs:  No accessory muscle use, bilaterally dec BS  Cor:  disant  Abd:  Symmetric, normoactive BS.  Soft, nontender, no masses, guarding or rebound.  Liver and spleen not enlarged  Extrem:  No cyanosis or edema  Skin:  No rashes.  Neuro: nonverbal      Mode: AC/ CMV, RR (machine): 14, TV (machine): 400, FiO2: 100, PEEP: 5, ITime: 1, MAP: 15, PIP: 34    LABS:                        11.6   26.45 )-----------( 362      ( 07 Sep 2021 10:38 )             40.1       WBC Count: 26.45 K/uL (21 @ 10:38)          140  |  90<L>  |  100<H>  ----------------------------<  153<H>  4.0   |  27  |  2.58<H>    Ca    10.0      07 Sep 2021 10:56    TPro  9.4<H>  /  Alb  3.3  /  TBili  0.7  /  DBili  x   /  AST  205<H>  /  ALT  81<H>  /  AlkPhos  133<H>        Creatinine, Serum: 2.58 mg/dL (21 @ 10:56)      Urinalysis Basic - ( 07 Sep 2021 10:41 )    Color: Yellow / Appearance: Turbid / S.010 / pH: x  Gluc: x / Ketone: Negative  / Bili: Negative / Urobili: Negative mg/dL   Blood: x / Protein: 500 mg/dL / Nitrite: Negative   Leuk Esterase: Moderate / RBC: 3-5 /HPF / WBC 26-50   Sq Epi: x / Non Sq Epi: Few / Bacteria: TNTC              MICROBIOLOGY:      RADIOLOGY & ADDITIONAL STUDIES:    --< from: CT Chest No Cont (21 @ 11:45) >    EXAM:  CT CHEST                                  PROCEDURE DATE:  2021          INTERPRETATION:  CLINICAL INFORMATION: Post cardiac arrest    COMPARISON: 6/15/2021    CONTRAST/COMPLICATIONS:  IV Contrast: NONE  0 cc administered   0 cc discarded  Oral Contrast: NONE  Complications: None reported at time of study completion    PROCEDURE:  CT of the Chest, Abdomen and Pelvis was performed.  Sagittal and coronal reformats were performed.    FINDINGS:  CHEST:  LUNGS AND LARGE AIRWAYS: Patent central airways. Tracheostomy cannula in position unchanged. There are retained secretions in the trachea. Bibasilar dependent consolidation with air bronchograms representing aspiration pneumonia and atelectasis.  PLEURA: No pleural effusion.  VESSELS: Nonaneurysmal  HEART: Heart size is normal, with cardiac vascular calcification. No pericardial effusion.  MEDIASTINUM AND JHONNY: Fluid and gaseous distention of the thoracic esophagus suggesting gastroesophageal reflux.  CHEST WALL AND LOWER NECK: Within normal limits.    ABDOMEN AND PELVIS:  LIVER: Within normal limits.  BILE DUCTS: Normal caliber.  GALLBLADDER: Distended without calcified stones.  SPLEEN: Within normal limits.  PANCREAS: Within normal limits.  ADRENALS: Bilateral thickened adrenals similar to prior  KIDNEYS/URETERS: Within normal limits.    BLADDER: Decompressed with Woody.  REPRODUCTIVE ORGANS: No masses    BOWEL: No bowel obstruction. Mild ileus transverse right colon. No mechanical obstruction. Marked fluid distention of the rectal vault. Gastrostomy tube in the stomach. Appendix no appendicitis  PERITONEUM: No ascites.  VESSELS: Nonaneurysmal.  RETROPERITONEUM/LYMPH NODES: No lymphadenopathy.  ABDOMINAL WALL: Within normal limits.  BONES: Stable.    IMPRESSION:  Bibasilar aspiration pneumonia and atelectasis.  Fluid and gaseous distention of the thoracic esophagus suggesting gastroesophageal reflux  Mild colonic ileus.  Marked fluid distention of the rectum  Additional findings as discussed    
S : 75y year old Female seen at bedside for Left foot ulceration distal aspect of the hallux with ingrown hallux nail.   Patient is non responsive.     Chief Complaint : Patient is a 75y old  Female who presents with a chief complaint of Unresponsive, post-cardiac arrest (11 Sep 2021 14:40)    HPI : HPI:  76 y/o f trach dependent, with PEG tube, DM, HTN, GERD, CVA, dementia, constipation p/w being found unresponsive at [facility], brought in by EMS for post cardiac arrest care.       In ED, pt was given IVF NS bolus, was foudn to be hypotensive, and started on levophed for pressor support, and CT showed signs of aspiration PNA, an dpt was started on IV zosyn.     (07 Sep 2021 12:19)      Patient admits to  (-) Fevers, (-) Chills, (-) Nausea, (-) Vomiting, (-) Shortness of Breath      PMH: Dementia of frontal lobe type    Aphasic stroke    Diabetes mellitus    Respiratory failure    Hypertension    GERD (gastroesophageal reflux disease)    Constipation    Respiratory failure    CVA (cerebral vascular accident)    HTN (hypertension)    DM (diabetes mellitus)    Advanced dementia    COVID-19 virus detected    Quadriplegia    Pneumonia    Type II diabetes mellitus      PSH:Hx of appendectomy    Gastrostomy in place    Tracheostomy in place    Tracheostomy tube present    Feeding by G-tube        Allergies:codeine (Hives)      Labs:                          8.3    7.52  )-----------( 247      ( 11 Sep 2021 07:30 )             27.8     WBC Trend  7.52 Date (09-11 @ 07:30)  5.50 Date (09-10 @ 08:17)  7.69 Date (09-09 @ 15:06)  4.84 Date (09-09 @ 07:04)  7.65 Date (09-08 @ 09:03)  8.44 Date (09-08 @ 06:55)  26.45<H> Date (09-07 @ 10:38)      Chem  09-11    141  |  107  |  18  ----------------------------<  132<H>  4.4   |  20<L>  |  1.12    Ca    8.8      11 Sep 2021 07:30  Phos  2.0     09-11  Mg     1.7     09-11    TPro  6.7  /  Alb  2.2<L>  /  TBili  0.3  /  DBili  x   /  AST  38  /  ALT  45  /  AlkPhos  86  09-11          T(F): 98.2 (09-11-21 @ 12:47), Max: 98.8 (09-10-21 @ 16:10)  HR: 66 (09-11-21 @ 13:07) (65 - 99)  BP: 108/69 (09-11-21 @ 13:00) (83/46 - 145/62)  RR: 15 (09-11-21 @ 13:00) (9 - 47)  SpO2: 100% (09-11-21 @ 13:07) (94% - 100%)  Wt(kg): --    O:   General: Pleasant  female NAD & AOX3.    Integument:  Skin warm, dry and supple bilateral.    Ulceration distal aspect of the hallux Left with ingrowing nail left hallux.  + hyperkeratotic border, wound base Necrotic , wound size (1.5 cm X1.5 cm X .5cm) + edema, + jose roberto-wound erythema, - purulence, + fluctuance, - tracking/tunneling, - probe to bone.  There is a partially avulsed and ingrowing left hallux nail tibial and fibular aspects left hallux   Vascular: Dorsalis Pedis and Posterior Tibial pulses N/P.  Capillary re-fill time less then 3 seconds digits 1-5 bilateral.    Neuro: Protective sensation diminished to the level of the digits bilateral.  MSK: Muscle strength 3/5 all major muscle groups bilateral.  Deformity:  A: diabetic ulceration distal aspect hallux Left foot      ingrown left hallux nail       P:   Chart reviewed and Patient evaluated  Discussed diagnosis and treatment with patient  Wound flush with normal saline  Excisional debridement  on non viable soft  tissue  through the subcutaneous tissue down to red granular bleeding tissue with dilute betadine saline scrub and application of bacitracin and dry sterile dressing  partial nail avulsion left  hallux tibial and fibular aspects left hallux   X-rays  ordered three views left foot to rule out OM    cont with IV ABX as per ID  Ordered VICTOR HUGO and vascular consultation   Weight bearing/Non-weight bearing  to   Offloading to bilateral Heels.   Discussed importance of daily foot examinations and proper shoe gear and to importance of lower Fasting Blood Glucose levels.   Podiatry will follow while in house.   will discuss care plan  with all  Attendings
S : 75y year old Female seen at bedside for Left foot ulceration distal aspect of the left hallux with ingrowing nail. .  Patient  is non verbal and non responsive. .    Chief Complaint : Patient is a 75y old  Female who presents with a chief complaint of Unresponsive, post-cardiac arrest (11 Sep 2021 11:17)    HPI : HPI:  74 y/o f trach dependent, with PEG tube, DM, HTN, GERD, CVA, dementia, constipation p/w being found unresponsive at [facility], brought in by EMS for post cardiac arrest care.       In ED, pt was given IVF NS bolus, was foudn to be hypotensive, and started on levophed for pressor support, and CT showed signs of aspiration PNA, an dpt was started on IV zosyn.     (07 Sep 2021 12:19)      Patient admits to  (-) Fevers, (-) Chills, (-) Nausea, (-) Vomiting, (-) Shortness of Breath      PMH: Dementia of frontal lobe type    Aphasic stroke    Diabetes mellitus    Respiratory failure    Hypertension    GERD (gastroesophageal reflux disease)    Constipation    Respiratory failure    CVA (cerebral vascular accident)    HTN (hypertension)    DM (diabetes mellitus)    Advanced dementia    COVID-19 virus detected    Quadriplegia    Pneumonia    Type II diabetes mellitus      PSH:Hx of appendectomy    Gastrostomy in place    Tracheostomy in place    Tracheostomy tube present    Feeding by G-tube        Allergies:codeine (Hives)      Labs:                          8.3    7.52  )-----------( 247      ( 11 Sep 2021 07:30 )             27.8     WBC Trend  7.52 Date (09-11 @ 07:30)  5.50 Date (09-10 @ 08:17)  7.69 Date (09-09 @ 15:06)  4.84 Date (09-09 @ 07:04)  7.65 Date (09-08 @ 09:03)  8.44 Date (09-08 @ 06:55)  26.45<H> Date (09-07 @ 10:38)      Chem  09-11    141  |  107  |  18  ----------------------------<  132<H>  4.4   |  20<L>  |  1.12    Ca    8.8      11 Sep 2021 07:30  Phos  2.0     09-11  Mg     1.7     09-11    TPro  6.7  /  Alb  2.2<L>  /  TBili  0.3  /  DBili  x   /  AST  38  /  ALT  45  /  AlkPhos  86  09-11          T(F): 98.2 (09-11-21 @ 12:47), Max: 98.8 (09-10-21 @ 16:10)  HR: 66 (09-11-21 @ 13:07) (65 - 99)  BP: 108/69 (09-11-21 @ 13:00) (83/46 - 145/62)  RR: 15 (09-11-21 @ 13:00) (9 - 47)  SpO2: 100% (09-11-21 @ 13:07) (94% - 100%)  Wt(kg): --    O:   General: Pleasant  female NAD & AOX3.    Integument:  Skin warm, dry and supple bilateral.    Ulceration distal aspect of the left hallux with ingrowing nail left hallux , + hyperkeratotic border, wound base /Necrotic  , wound size (1.5- cm X 1.5 cm X.5–cm) + edema, + jose roberto-wound erythema, - purulence, - fluctuance, - tracking/tunneling, - probe to bone.  the nail plate is incurvated and ingrown tibial, distal and fibluar aspects. the nail plate is partially avulsed in nature.   Vascular: Dorsalis Pedis and Posterior Tibial pulses N/P.  Capillary re-fill time less then 3 seconds digits 1-5 bilateral.    Neuro: Protective sensation diminished to the level of the digits bilateral.  MSK: Muscle strength 3/5 all major muscle groups bilateral.  Deformity:  A: Left foot ulceration distal aspect of the hallux     ingrown left hallux nail       P:   Chart reviewed and Patient evaluated  Discussed diagnosis and treatment with patient  Wound flush with normal saline  Excisional debridement  on non viable soft  tissue through the subcutaneous tissue  base down to  red granular base   Left  hallux ulceration. Partial  nail avulsion left hallux nail tibial and fibular aspects.   Applied bacitracin  with dry sterile dressing  X-rays ordered left foot   Continue with IV antibiotics As Per ID  Ordered VICTOR HUGO and vascular consultation   Weight bearing/Non-weight bearing  to left foot   Offloading to bilateral Heels.   Discussed importance of daily foot examinations and proper shoe gear and to importance of lower Fasting Blood Glucose levels.   Podiatry will follow while in house.   will discuss care plan  with all  Attendings 
    BREA BECKHAM    [place]  [facility]    Patient is a 75y old  Female who presents with a chief complaint of      Allergies    codeine (Hives)    Intolerances        HPI:      PAST MEDICAL & SURGICAL HISTORY:  Dementia of frontal lobe type    Aphasic stroke    Diabetes mellitus    Respiratory failure    Hypertension    GERD (gastroesophageal reflux disease)    Constipation    Respiratory failure    CVA (cerebral vascular accident)    HTN (hypertension)    DM (diabetes mellitus)    Advanced dementia    COVID-19 virus detected    Quadriplegia    Pneumonia    Type II diabetes mellitus    Hx of appendectomy    Gastrostomy in place    Tracheostomy in place    Tracheostomy tube present    Feeding by G-tube        FAMILY HISTORY:  No pertinent family history in first degree relatives          MEDICATIONS   piperacillin/tazobactam IVPB. 3.375 Gram(s) IV Intermittent Once  sodium chloride 0.9% Bolus 1000 milliLiter(s) IV Bolus once      Vital Signs Last 24 Hrs  T(C): 38.4 (07 Sep 2021 09:55), Max: 38.4 (07 Sep 2021 09:55)  T(F): 101.1 (07 Sep 2021 09:55), Max: 101.1 (07 Sep 2021 09:55)  HR: 113 (07 Sep 2021 09:55) (113 - 113)  BP: 84/51 (07 Sep 2021 09:55) (84/51 - 84/51)  BP(mean): 62 (07 Sep 2021 09:55) (62 - 62)  RR: 38 (07 Sep 2021 09:55) (38 - 38)  SpO2: 100% (07 Sep 2021 09:55) (100% - 100%)            LABS:                    WBC:      MICROBIOLOGY:  RECENT CULTURES:                  Sodium:          Hemoglobin:      Platelets:             RADIOLOGY & ADDITIONAL STUDIES:  
  Chief Complaint:  Patient is a 75y old  Female who presents with a chief complaint of Unresponsive, post-cardiac arrest (08 Sep 2021 10:10)    Dementia of frontal lobe type    Aphasic stroke    Diabetes mellitus    Respiratory failure    Hypertension    GERD (gastroesophageal reflux disease)    Constipation    Respiratory failure    CVA (cerebral vascular accident)    HTN (hypertension)    DM (diabetes mellitus)    Advanced dementia    COVID-19 virus detected    Quadriplegia    Pneumonia    Type II diabetes mellitus    Hx of appendectomy    Gastrostomy in place    Tracheostomy in place    Tracheostomy tube present    Feeding by G-tube       HPI:  74 y/o f trach dependent, with PEG tube, DM, HTN, GERD, CVA, dementia, constipation p/w being found unresponsive at [facility], brought in by EMS for post cardiac arrest care.       In ED, pt was given IVF NS bolus, was foudn to be hypotensive, and started on levophed for pressor support, and CT showed signs of aspiration PNA, an dpt was started on IV zosyn.     (07 Sep 2021 12:19)      codeine (Hives)      acetaminophen   Tablet .. 650 milliGRAM(s) Oral every 6 hours PRN  albuterol/ipratropium for Nebulization 3 milliLiter(s) Nebulizer every 6 hours  aspirin  chewable 81 milliGRAM(s) Enteral Tube daily  bisacodyl Suppository 10 milliGRAM(s) Rectal at bedtime  chlorhexidine 2% Cloths 1 Application(s) Topical daily  dextrose 40% Gel 15 Gram(s) Oral once  dextrose 5%. 1000 milliLiter(s) IV Continuous <Continuous>  dextrose 5%. 1000 milliLiter(s) IV Continuous <Continuous>  dextrose 50% Injectable 25 Gram(s) IV Push once  dextrose 50% Injectable 12.5 Gram(s) IV Push once  dextrose 50% Injectable 25 Gram(s) IV Push once  fentaNYL   Patch  12 MICROgram(s)/Hr 1 Patch Transdermal every 72 hours  fluconAZOLE   Tablet 100 milliGRAM(s) Oral daily  glucagon  Injectable 1 milliGRAM(s) IntraMuscular once  heparin   Injectable 5000 Unit(s) SubCutaneous every 8 hours  insulin glargine Injectable (LANTUS) 36 Unit(s) SubCutaneous at bedtime  insulin lispro (ADMELOG) corrective regimen sliding scale   SubCutaneous every 6 hours  lactated ringers. 1000 milliLiter(s) IV Continuous <Continuous>  lactated ringers. 1000 milliLiter(s) IV Continuous <Continuous>  lactobacillus acidophilus 2 Tablet(s) Oral daily  LORazepam   Injectable 0.5 milliGRAM(s) IV Push three times a day  norepinephrine Infusion 0.05 MICROgram(s)/kG/Min IV Continuous <Continuous>  pantoprazole  Injectable 40 milliGRAM(s) IV Push daily  piperacillin/tazobactam IVPB.. 3.375 Gram(s) IV Intermittent every 12 hours  polyethylene glycol 3350 17 Gram(s) Oral daily  senna 2 Tablet(s) Oral at bedtime  sodium chloride 0.9% lock flush 10 milliLiter(s) IV Push every 1 hour PRN  sucralfate suspension 1 Gram(s) Oral four times a day        FAMILY HISTORY:  No pertinent family history in first degree relatives        Physical Exam:    Vital Signs:  Vital Signs Last 24 Hrs  T(C): 36.8 (08 Sep 2021 08:30), Max: 37.2 (08 Sep 2021 03:00)  T(F): 98.3 (08 Sep 2021 08:30), Max: 99 (08 Sep 2021 06:45)  HR: 65 (08 Sep 2021 11:00) (47 - 86)  BP: 114/58 (08 Sep 2021 11:00) (74/38 - 148/48)  BP(mean): 76 (08 Sep 2021 11:00) (47 - 83)  RR: 20 (08 Sep 2021 11:00) (0 - 37)  SpO2: 96% (08 Sep 2021 11:00) (93% - 100%)  Daily Height in cm: 165.1 (07 Sep 2021 15:10)    Daily Weight in k.2 (08 Sep 2021 05:54)    s  HEENT:  NC/AT,  conjunctivae clear and pink, no thyromegaly, nodules, adenopathy, no JVD  Chest:  Full & symmetric excursion, no increased effort, breath sounds clear  Cardiovascular:  Regular rhythm, S1, S2, no murmur/rub/S3/S4, no abdominal bruit, no edema  Abdomen:  Soft, non-tender, non-distended, normoactive bowel sounds,  no masses ,no hepatosplenomeagaly, no signs of chronic liver disease  Extremities:  no cyanosis,clubbing or edema  Laboratory:                            7.7    7.65  )-----------( 289      ( 08 Sep 2021 09:03 )             25.9     09-08    141  |  102  |  59<H>  ----------------------------<  194<H>  3.1<L>   |  29  |  1.61<H>    Ca    7.9<L>      08 Sep 2021 06:55  Phos  3.8         TPro  6.2  /  Alb  2.2<L>  /  TBili  0.6  /  DBili  x   /  AST  118<H>  /  ALT  63<H>  /  AlkPhos  79      LIVER FUNCTIONS - ( 08 Sep 2021 06:55 )  Alb: 2.2 g/dL / Pro: 6.2 g/dL / ALK PHOS: 79 U/L / ALT: 63 U/L DA / AST: 118 U/L / GGT: x           PT/INR - ( 08 Sep 2021 06:55 )   PT: 12.5 sec;   INR: 1.03 ratio         PTT - ( 07 Sep 2021 10:38 )  PTT:41.4 sec  Urinalysis Basic - ( 07 Sep 2021 10:41 )    Color: Yellow / Appearance: Turbid / S.010 / pH: x  Gluc: x / Ketone: Negative  / Bili: Negative / Urobili: Negative mg/dL   Blood: x / Protein: 500 mg/dL / Nitrite: Negative   Leuk Esterase: Moderate / RBC: 3-5 /HPF / WBC 26-50   Sq Epi: x / Non Sq Epi: Few / Bacteria: TNTC        Imaging:      Assessment:      Plan:       
Date/Time Patient Seen:  		  Referring MD:   Data Reviewed	       Patient is a 75y old  Female who presents with a chief complaint of     Subjective/HPI   · Chief Complaint: The patient is a 75y Female complaining of unresponsive.  · Unable to Obtain: Dementia   · HPI Objective Statement: BIBEMS from [facility] [place] for ams.  post cardiac arrest for a few minutes as per S crew.  Pt is trach dependent.    HIV:    HIV Test Questions:  · In accordance with [place] State law, we offer every patient who comes to our ED an HIV test. Would you like to be tested today?	Previously Declined (within the last year)   [facility] Frontotemporal dementia, CVA, chronic vent dependent respiratory failure, dysphagia s/p PEG, recurrent UTI/VAP presents with septic shock from [facility] facility. She was last admitted at [facility] 10/ 2020 with proteus UTI ,sepsis ,hypernatremia and RYAN  PAST MEDICAL & SURGICAL HISTORY:  Dementia of frontal lobe type    Aphasic stroke    Diabetes mellitus    Respiratory failure    Hypertension    GERD (gastroesophageal reflux disease)    Constipation    Respiratory failure    CVA (cerebral vascular accident)    HTN (hypertension)    DM (diabetes mellitus)    Advanced dementia    COVID-19 virus detected    Quadriplegia    Pneumonia    Type II diabetes mellitus    Hx of appendectomy    Gastrostomy in place    Tracheostomy in place    Tracheostomy tube present    Feeding by G-tube      lives in [place]  non smoker  non drinker  retired        Medication list         MEDICATIONS  (STANDING):  sodium chloride 0.9% Bolus 2000 milliLiter(s) IV Bolus once    MEDICATIONS  (PRN):         Vitals log        ICU Vital Signs Last 24 Hrs  T(C): 37.2 (07 Sep 2021 11:31), Max: 38.4 (07 Sep 2021 09:55)  T(F): 99 (07 Sep 2021 11:31), Max: 101.1 (07 Sep 2021 09:55)  HR: 72 (07 Sep 2021 11:31) (72 - 130)  BP: 55/33 (07 Sep 2021 11:31) (55/33 - 184/66)  BP(mean): 36 (07 Sep 2021 11:31) (36 - 79)  ABP: --  ABP(mean): --  RR: 35 (07 Sep 2021 11:31) (35 - 40)  SpO2: 97% (07 Sep 2021 11:31) (97% - 100%)       Mode: AC/ CMV (Assist Control/ Continuous Mandatory Ventilation)  RR (machine): 14  TV (machine): 400  FiO2: 100  PEEP: 5  ITime: 1  MAP: 15  PIP: 34      Input and Output:  I&O's Detail      Lab Data                        11.6   26.45 )-----------( 362      ( 07 Sep 2021 10:38 )             40.1     09-07    140  |  90<L>  |  100<H>  ----------------------------<  153<H>  4.0   |  27  |  2.58<H>    Ca    10.0      07 Sep 2021 10:56    TPro  9.4<H>  /  Alb  3.3  /  TBili  0.7  /  DBili  x   /  AST  205<H>  /  ALT  81<H>  /  AlkPhos  133<H>  09-07      CARDIAC MARKERS ( 07 Sep 2021 10:38 )  .127 ng/mL / x     / x     / x     / x            Review of Systems	      Objective     Physical Examination        Pertinent Lab findings & Imaging      Woody:  NO   Adequate UO     I&O's Detail           Discussed with:     Cultures:	        Radiology        EXAM:  CT BRAIN                                  PROCEDURE DATE:  09/07/2021          INTERPRETATION:  CLINICAL INDICATION: Dementia, Alzheimer's suspected.    TECHNIQUE: CT of the head was performed without the administration of intravenous contrast.    COMPARISON: CT head 5/29/2020.    FINDINGS:  Severe diffuse parenchymal atrophy is again noted.    No acute intracranial hemorrhage. Ex vacuo dilatation of the lateral ventricles.    White matter hypoattenuating foci are noted, compatible with age-indeterminate small vessel disease.    No extra-axial fluid collections.    The visualized intraorbital contents are unremarkable. Air-fluid level in the right sphenoid sinus. The mastoid air cells are clear. The visualized soft tissues and osseous structures appear normal.    IMPRESSION:    -Severe diffuse parenchymal atrophy again noted. No acute intracranial hemorrhage.    -Possible acute right sphenoid sinusitis.    --- End of Report ---              DONG SANTIAGO MD; Attending Radiologist  This document has been electronically signed. Sep  7 2021 11:27AM                      
History of Present Illness: The patient is a 75 year old female with a history of HTN, DM, CVA, dementia, chronic respiratory failure s/p trach, PEG who is admitted after cardiac arrest. The patient is unable to provide history. Unclear down time. She was found to be in septic shock with possible aspiration PNA.    Past Medical/Surgical History:   HTN, DM, CVA, dementia, chronic respiratory failure s/p trach, PEG     Medications:  Home Medications:  acetaminophen 160 mg/5 mL oral suspension: 20.31 milliliter(s) by gastrostomy tube every 6 hours, As Needed (23 Jun 2021 09:08)  albuterol 90 mcg/inh inhalation aerosol: 2 puff(s) inhaled every 6 hours (23 Jun 2021 09:08)  aspirin 81 mg oral tablet, chewable: 1 tab(s) by PEG tube once a day (15 Zay 2021 15:07)  Bacid (LAC) oral tablet: 2 tab(s) by gastrostomy tube once a day (23 Jun 2021 09:08)  bisacodyl 5 mg oral delayed release tablet: 1 tab(s) orally once a day (at bedtime) (23 Jun 2021 09:08)  Carafate 1 g/10 mL oral suspension: 10 milliliter(s) by gastrostomy tube 4 times a day (before meals and at bedtime) for 14 days (Started 6/4/21) (15 Zay 2021 15:07)  enoxaparin 40 mg/0.4 mL injectable solution: 40 milligram(s) injectable once a day (15 Zay 2021 15:07)  fentaNYL 12 mcg/hr transdermal film, extended release: 1 patch transdermal every 72 hours (23 Jun 2021 09:08)  insulin lispro 100 units/mL injectable solution:  injectable corrective regimen sliding scale  (23 Jun 2021 09:08)  ipratropium-albuterol 0.5 mg-2.5 mg/3 mL inhalation solution: 3 milliliter(s) inhaled 4 times a day (15 Zay 2021 15:07)  Lantus Solostar Pen 100 units/mL subcutaneous solution: 40 unit(s) subcutaneous once a day (at bedtime) (15 Zay 2021 15:07)  LORazepam 1 mg oral tablet: 1 tab(s) by gastrostomy tube every 8 hours (23 Jun 2021 09:08)  meropenem 1000 mg intravenous injection: 1000 milligram(s) intravenous every 8 hours x 3 doses  (23 Jun 2021 09:08)  pantoprazole 40 mg oral granule, delayed release: 1 packet(s) by gastrostomy tube once a day (23 Jun 2021 09:08)  polyethylene glycol 3350 oral powder for reconstitution: 17 gram(s) orally every 12 hours (23 Jun 2021 09:08)  senna 8.6 mg oral tablet: 1 tab(s) by gastrostomy tube once a day (at bedtime) (23 Jun 2021 09:08)      Family History: Non-contributory family history of premature cardiovascular atherosclerotic disease    Social History: No tobacco, alcohol or drug use    Review of Systems:  Unable to obtain    Physical Exam:  Vitals:        Vital Signs Last 24 Hrs  T(C): 36.8 (08 Sep 2021 08:30), Max: 37.2 (07 Sep 2021 11:31)  T(F): 98.3 (08 Sep 2021 08:30), Max: 99 (07 Sep 2021 11:31)  HR: 64 (08 Sep 2021 10:00) (47 - 99)  BP: 118/49 (08 Sep 2021 10:00) (55/33 - 148/48)  BP(mean): 70 (08 Sep 2021 10:00) (36 - 83)  RR: 14 (08 Sep 2021 10:00) (0 - 37)  SpO2: 96% (08 Sep 2021 10:00) (93% - 100%)  General: Unresponsive  HEENT: Trach  Neck: No JVD, no carotid bruit  Lungs: Coarse bilaterally  CV: RRR, nl S1/S2, no M/R/G  Abdomen: S/NT/ND, +BS  Extremities: No LE edema, no cyanosis  Neuro: AAOx0  Skin: No rash    Labs:                        7.7    7.65  )-----------( 289      ( 08 Sep 2021 09:03 )             25.9     09-08    141  |  102  |  59<H>  ----------------------------<  194<H>  3.1<L>   |  29  |  1.61<H>    Ca    7.9<L>      08 Sep 2021 06:55  Phos  3.8     09-08    TPro  6.2  /  Alb  2.2<L>  /  TBili  0.6  /  DBili  x   /  AST  118<H>  /  ALT  63<H>  /  AlkPhos  79  09-08    CARDIAC MARKERS ( 08 Sep 2021 06:55 )  .091 ng/mL / x     / x     / x     / x      CARDIAC MARKERS ( 07 Sep 2021 20:59 )  .123 ng/mL / x     / x     / x     / x      CARDIAC MARKERS ( 07 Sep 2021 10:38 )  .127 ng/mL / x     / x     / x     / x          PT/INR - ( 08 Sep 2021 06:55 )   PT: 12.5 sec;   INR: 1.03 ratio         PTT - ( 07 Sep 2021 10:38 )  PTT:41.4 sec    ECG: Sinus tachycardia, RBBB    Telemetry: Sinus rhythm

## 2021-09-11 NOTE — PROGRESS NOTE ADULT - SUBJECTIVE AND OBJECTIVE BOX
Patient is a 75y old  Female who presents with a chief complaint of Unresponsive, post-cardiac arrest (11 Sep 2021 16:03)      BRIEF HOSPITAL COURSE:   75F with PMHx of VDRF, sp PEG, functional quadriplegia,  DM, HTN, GERD, CVA, dementia who was admitted from NH s/p cardiac arrest.  Found to have UTI, aspiration PNA with septic shock requiring pressors.      Events last 24 hours: afebrile, hemodynamics remain stable off pressors, remains on full vent support O2 30%, tolerating tube feeds    PAST MEDICAL & SURGICAL HISTORY:  Dementia of frontal lobe type    Aphasic stroke    Diabetes mellitus    Respiratory failure    Hypertension    GERD (gastroesophageal reflux disease)    Constipation    Respiratory failure    CVA (cerebral vascular accident)    HTN (hypertension)    DM (diabetes mellitus)    Advanced dementia    COVID-19 virus detected    Quadriplegia    Pneumonia    Type II diabetes mellitus    Hx of appendectomy    Gastrostomy in place    Tracheostomy in place    Tracheostomy tube present    Feeding by G-tube        Review of Systems:  unable to perform due to pts cognitive impairment      Medications:  fluconAZOLE   Tablet 100 milliGRAM(s) Oral daily  linezolid    Tablet 600 milliGRAM(s) Oral every 12 hours  piperacillin/tazobactam IVPB.. 3.375 Gram(s) IV Intermittent every 12 hours      albuterol/ipratropium for Nebulization 3 milliLiter(s) Nebulizer every 6 hours    acetaminophen   Tablet .. 650 milliGRAM(s) Oral every 6 hours PRN  fentaNYL   Patch  12 MICROgram(s)/Hr 1 Patch Transdermal every 72 hours  LORazepam   Injectable 1 milliGRAM(s) IV Push every 2 hours PRN  LORazepam   Injectable 0.5 milliGRAM(s) IV Push three times a day  methocarbamol 250 milliGRAM(s) Oral daily      aspirin  chewable 81 milliGRAM(s) Enteral Tube daily    bisacodyl Suppository 10 milliGRAM(s) Rectal at bedtime  pantoprazole  Injectable 40 milliGRAM(s) IV Push two times a day  polyethylene glycol 3350 17 Gram(s) Oral daily  senna 2 Tablet(s) Oral at bedtime  simethicone 80 milliGRAM(s) Chew every 6 hours  sucralfate suspension 1 Gram(s) Oral four times a day      dextrose 40% Gel 15 Gram(s) Oral once  dextrose 50% Injectable 25 Gram(s) IV Push once  dextrose 50% Injectable 12.5 Gram(s) IV Push once  dextrose 50% Injectable 25 Gram(s) IV Push once  glucagon  Injectable 1 milliGRAM(s) IntraMuscular once  insulin glargine Injectable (LANTUS) 36 Unit(s) SubCutaneous at bedtime  insulin lispro (ADMELOG) corrective regimen sliding scale   SubCutaneous every 6 hours    dextrose 5%. 1000 milliLiter(s) IV Continuous <Continuous>  dextrose 5%. 1000 milliLiter(s) IV Continuous <Continuous>  iron sucrose Injectable 100 milliGRAM(s) IV Push every 24 hours  lactated ringers. 1000 milliLiter(s) IV Continuous <Continuous>  potassium phosphate / sodium phosphate Powder (PHOS-NaK) 1 Packet(s) Oral two times a day  sodium chloride 0.9% lock flush 10 milliLiter(s) IV Push every 1 hour PRN      chlorhexidine 0.12% Liquid 15 milliLiter(s) Oral Mucosa two times a day  chlorhexidine 2% Cloths 1 Application(s) Topical daily    lactobacillus acidophilus 2 Tablet(s) Oral daily      Mode: AC/ CMV (Assist Control/ Continuous Mandatory Ventilation)  RR (machine): 14  TV (machine): 400  FiO2: 30  PEEP: 5  ITime: 1  MAP: 10  PIP: 22      ICU Vital Signs Last 24 Hrs  T(C): 36.8 (11 Sep 2021 12:47), Max: 37.1 (11 Sep 2021 00:00)  T(F): 98.2 (11 Sep 2021 12:47), Max: 98.7 (11 Sep 2021 00:00)  HR: 64 (11 Sep 2021 19:33) (62 - 99)  BP: 115/63 (11 Sep 2021 18:00) (84/45 - 127/56)  BP(mean): 77 (11 Sep 2021 18:00) (58 - 82)  ABP: --  ABP(mean): --  RR: 21 (11 Sep 2021 18:00) (6 - 47)  SpO2: 100% (11 Sep 2021 19:33) (94% - 100%)      I&O's Summary    10 Sep 2021 07:01  -  11 Sep 2021 07:00  --------------------------------------------------------  IN: 2400 mL / OUT: 1000 mL / NET: 1400 mL    11 Sep 2021 07:01  -  11 Sep 2021 20:38  --------------------------------------------------------  IN: 1780 mL / OUT: 0 mL / NET: 1780 mL              LABS:                        8.3    7.52  )-----------( 247      ( 11 Sep 2021 07:30 )             27.8     09-11    141  |  107  |  18  ----------------------------<  132<H>  4.4   |  20<L>  |  1.12    Ca    8.8      11 Sep 2021 07:30  Phos  2.0     09-11  Mg     1.7     09-11    TPro  6.7  /  Alb  2.2<L>  /  TBili  0.3  /  DBili  x   /  AST  38  /  ALT  45  /  AlkPhos  86  09-11          CAPILLARY BLOOD GLUCOSE      POCT Blood Glucose.: 161 mg/dL (11 Sep 2021 17:46)    PT/INR - ( 11 Sep 2021 07:30 )   PT: 12.7 sec;   INR: 1.05 ratio             CULTURES:  Culture Results:   Numerous Serratia marcescens  Numerous Pseudomonas aeruginosa  Normal Respiratory Elvira present (09-08 @ 13:17)  Culture Results:   No growth to date. (09-07 @ 15:14)  Culture Results:   No growth to date. (09-07 @ 15:14)  Culture Results:   >100,000 CFU/ml Proteus mirabilis (09-07 @ 15:14)  Rapid RVP Result: NotDetec (09-07 @ 12:40)      Physical Examination:    General: No acute distress on vent, opens eyes to stimulus, gross contractures, does not follow commands    HEENT: Pupils equal, reactive to light.  Symmetric, sclera anicteric    PULM: Course BS bilaterally, no wheezing    CVS: Regular rate and rhythm, no murmurs, rubs, or gallops    ABD: Soft, nondistended, nontender, normoactive bowel sounds, PEG site c/d/i, no drainage or erythema    EXT: + edema, SCDs to bilateral LE    SKIN: Warm and well perfused, no rashes noted.    RADIOLOGY:   EXAM:  XR CHEST PORTABLE ROUTINE 1V                                  PROCEDURE DATE:  09/09/2021          INTERPRETATION:  Chest one view    HISTORY: Pneumonia    COMPARISON STUDY: 9/7/2021    Frontal expiratory view of the chest shows the heart to be normal in size. Tracheostomy tube and left jugular line are again noted.    The lungs show questionable right base infiltrate and there is no evidence of pneumothorax nor pleural effusion.    IMPRESSION:  Questionable right infiltrate. Follow-up study is recommended as clinically warranted.      Thank you for the courtesy of this referral.    --- End of Report ---              ELLE PATTEN MD; Attending Interventional Radiologist  This document has been electronically signed. Sep  9 2021  4:54PM    EXAM:  US TTE W DOPPLER COMPLETE                                  PROCEDURE DATE:  09/08/2021          INTERPRETATION:  Indication: cardiac arrest    Technician: Cat Wolf    Study Quality: Technical difficult study    Height 5 feet 4 inches, weight 110 pounds, blood pressure 114/58 mmHg    Measurements: Aortic root size 2.4 cm, left it is at 2 1 8 cm, right atrial 4.1 cm, right ventricular size 2.2 cm, left ventricular end-diastolic diameter 3.7 cm, left ventricular end-systolic diameter 2.4 cm, septal wall thickness 1.0 cm, posterior wall thickness 0.8 cm, aortic velocity 1.7 m/s, mitral E velocity 1.1 m/s, ejection fraction approximately 65%.    Mitral Valve: Mildly thickened mitral valve with normal opening. Mild mitral regurgitation  Aortic Valve: Mild fibrocalcific changes with normal cusp excursion.  Left Atrium: Normal size  Left Ventricle: Normal size, normal wall thickness with normal left ventricular systolic function with mild diastolic dysfunction  Pericardium: Small posterior pericardial effusion  Tricuspid Valve: Not well-visualized but appears to be normal with mild tricuspid regurgitation with estimated right ventricular systolic pressure 51 mmHg, IVC size 1.9 cm with less than 50% respiratory variation  Pulmonic Valve: Not well-visualized but appears to be normal  Right Ventricle: Upper limits of normal size with normal right ventricular systolic function  Right Atrium: Mildly enlarged    IMPRESSION:  1. grossly normal overall left ventricular systolic function with mild diastolic dysfunction  2. normal right ventricular systolic function  3. mild tricuspid regurgitation with moderate pulmonary hypertension    --- End of Report ---              ANGELY R PALLA MEDICINE/CARDIOLOGY  This document has been electronically signed. Sep  9 2021  8:14AM  EXAM:  CT CHEST                                  PROCEDURE DATE:  09/07/2021          INTERPRETATION:  CLINICAL INFORMATION: Post cardiac arrest    COMPARISON: 6/15/2021    CONTRAST/COMPLICATIONS:  IV Contrast: NONE  0 cc administered   0 cc discarded  Oral Contrast: NONE  Complications: None reported at time of study completion    PROCEDURE:  CT of the Chest, Abdomen and Pelvis was performed.  Sagittal and coronal reformats were performed.    FINDINGS:  CHEST:  LUNGS AND LARGE AIRWAYS: Patent central airways. Tracheostomy cannula in position unchanged. There are retained secretions in the trachea. Bibasilar dependent consolidation with air bronchograms representing aspiration pneumonia and atelectasis.  PLEURA: No pleural effusion.  VESSELS: Nonaneurysmal  HEART: Heart size is normal, with cardiac vascular calcification. No pericardial effusion.  MEDIASTINUM AND JHONNY: Fluid and gaseous distention of the thoracic esophagus suggesting gastroesophageal reflux.  CHEST WALL AND LOWER NECK: Within normal limits.    ABDOMEN AND PELVIS:  LIVER: Within normal limits.  BILE DUCTS: Normal caliber.  GALLBLADDER: Distended without calcified stones.  SPLEEN: Within normal limits.  PANCREAS: Within normal limits.  ADRENALS: Bilateral thickened adrenals similar to prior  KIDNEYS/URETERS: Within normal limits.    BLADDER: Decompressed with Woody.  REPRODUCTIVE ORGANS: No masses    BOWEL: No bowel obstruction. Mild ileus transverse right colon. No mechanical obstruction. Marked fluid distention of the rectal vault. Gastrostomy tube in the stomach. Appendix no appendicitis  PERITONEUM: No ascites.  VESSELS: Nonaneurysmal.  RETROPERITONEUM/LYMPH NODES: No lymphadenopathy.  ABDOMINAL WALL: Within normal limits.  BONES: Stable.    IMPRESSION:  Bibasilar aspiration pneumonia and atelectasis.  Fluid and gaseous distention of the thoracic esophagus suggesting gastroesophageal reflux  Mild colonic ileus.  Marked fluid distention of the rectum  Additional findings as discussed      --- End of Report ---              NA LIMA MD; Attending Radiologist  This document has been electronically signed. Sep  7 2021 12:47PM

## 2021-09-11 NOTE — PROGRESS NOTE ADULT - SUBJECTIVE AND OBJECTIVE BOX
BRE[facility] BEC[facility]    Patient is a 75y old  Female who presents with a chief complaint of Unresponsive, post-cardiac arrest (11 Sep 2021 08:56)       Allergies    codeine (Hives)    Intolerances        HPI:  76 y/o f trach dependent, with PEG tube, DM, HTN, GERD, CVA, dementia, constipation p/w being found unresponsive at [facility], brought in by EMS for post cardiac arrest care.       In ED, pt was given IVF NS bolus, was foudn to be hypotensive, and started on levophed for pressor support, and CT showed signs of aspiration PNA, an dpt was started on IV zosyn.     (07 Sep 2021 12:19)      PAST MEDICAL & SURGICAL HISTORY:  Dementia of frontal lobe type    Aphasic stroke    Diabetes mellitus    Respiratory failure    Hypertension    GERD (gastroesophageal reflux disease)    Constipation    Respiratory failure    CVA (cerebral vascular accident)    HTN (hypertension)    DM (diabetes mellitus)    Advanced dementia    COVID-19 virus detected    Quadriplegia    Pneumonia    Type II diabetes mellitus    Hx of appendectomy    Gastrostomy in place    Tracheostomy in place    Tracheostomy tube present    Feeding by G-tube        FAMILY HISTORY:  No pertinent family history in first degree relatives          MEDICATIONS   acetaminophen   Tablet .. 650 milliGRAM(s) Oral every 6 hours PRN  albuterol/ipratropium for Nebulization 3 milliLiter(s) Nebulizer every 6 hours  aspirin  chewable 81 milliGRAM(s) Enteral Tube daily  bisacodyl Suppository 10 milliGRAM(s) Rectal at bedtime  chlorhexidine 0.12% Liquid 15 milliLiter(s) Oral Mucosa two times a day  chlorhexidine 2% Cloths 1 Application(s) Topical daily  dextrose 40% Gel 15 Gram(s) Oral once  dextrose 5%. 1000 milliLiter(s) IV Continuous <Continuous>  dextrose 5%. 1000 milliLiter(s) IV Continuous <Continuous>  dextrose 50% Injectable 25 Gram(s) IV Push once  dextrose 50% Injectable 12.5 Gram(s) IV Push once  dextrose 50% Injectable 25 Gram(s) IV Push once  fentaNYL   Patch  12 MICROgram(s)/Hr 1 Patch Transdermal every 72 hours  fluconAZOLE   Tablet 100 milliGRAM(s) Oral daily  glucagon  Injectable 1 milliGRAM(s) IntraMuscular once  insulin glargine Injectable (LANTUS) 36 Unit(s) SubCutaneous at bedtime  insulin lispro (ADMELOG) corrective regimen sliding scale   SubCutaneous every 6 hours  iron sucrose Injectable 100 milliGRAM(s) IV Push every 24 hours  lactated ringers. 1000 milliLiter(s) IV Continuous <Continuous>  lactobacillus acidophilus 2 Tablet(s) Oral daily  linezolid    Tablet 600 milliGRAM(s) Oral every 12 hours  LORazepam   Injectable 1 milliGRAM(s) IV Push every 2 hours PRN  LORazepam   Injectable 0.5 milliGRAM(s) IV Push three times a day  pantoprazole  Injectable 40 milliGRAM(s) IV Push two times a day  piperacillin/tazobactam IVPB.. 3.375 Gram(s) IV Intermittent every 12 hours  polyethylene glycol 3350 17 Gram(s) Oral daily  potassium phosphate / sodium phosphate Powder (PHOS-NaK) 1 Packet(s) Oral two times a day  senna 2 Tablet(s) Oral at bedtime  simethicone 80 milliGRAM(s) Chew every 6 hours  sodium chloride 0.9% lock flush 10 milliLiter(s) IV Push every 1 hour PRN  sucralfate suspension 1 Gram(s) Oral four times a day      Vital Signs Last 24 Hrs  T(C): 36.7 (11 Sep 2021 08:17), Max: 37.1 (10 Sep 2021 16:10)  T(F): 98.1 (11 Sep 2021 08:17), Max: 98.8 (10 Sep 2021 16:10)  HR: 65 (11 Sep 2021 09:00) (65 - 118)  BP: 101/52 (11 Sep 2021 09:00) (83/46 - 152/57)  BP(mean): 67 (11 Sep 2021 09:00) (58 - 86)  RR: 12 (11 Sep 2021 09:00) (9 - 47)  SpO2: 100% (11 Sep 2021 09:00) (94% - 100%)      09-10-21 @ 07:01  -  09-11-21 @ 07:00  --------------------------------------------------------  IN: 1920 mL / OUT: 600 mL / NET: 1320 mL        Mode: AC/ CMV (Assist Control/ Continuous Mandatory Ventilation), RR (machine): 14, TV (machine): 400, FiO2: 30, PEEP: 5, ITime: 1, MAP: 9, PIP: 26    LABS:                        8.3    7.52  )-----------( 247      ( 11 Sep 2021 07:30 )             27.8     09-11    141  |  107  |  18  ----------------------------<  132<H>  4.4   |  20<L>  |  1.12    Ca    8.8      11 Sep 2021 07:30  Phos  2.0     09-11  Mg     1.7     09-11    TPro  6.7  /  Alb  2.2<L>  /  TBili  0.3  /  DBili  x   /  AST  38  /  ALT  45  /  AlkPhos  86  09-11              WBC:  WBC Count: 7.52 K/uL (09-11 @ 07:30)  WBC Count: 5.50 K/uL (09-10 @ 08:17)  WBC Count: 7.69 K/uL (09-09 @ 15:06)  WBC Count: 4.84 K/uL (09-09 @ 07:04)  WBC Count: 7.65 K/uL (09-08 @ 09:03)  WBC Count: 8.44 K/uL (09-08 @ 06:55)  WBC Count: 26.45 K/uL (09-07 @ 10:38)      MICROBIOLOGY:  RECENT CULTURES:  09-08 .Sputum Sputum Serratia marcescens  Pseudomonas aeruginosa   Moderate polymorphonuclear leukocytes per low power field  Few Squamous epithelial cells per low power field  Moderate Gram Positive Rods per oil power field  Moderate Gram Negative Rods per oil power field  Few Yeast like cells per oil power field  Moderate Gram Negative Diplococci per oil power field   Numerous Serratia marcescens  Numerous Pseudomonas aeruginosa  Normal Respiratory Elvira present    09-07 .Blood Blood-Peripheral Proteus mirabilis XXXX   No growth to date.                    Sodium:  Sodium, Serum: 141 mmol/L (09-11 @ 07:30)  Sodium, Serum: 140 mmol/L (09-10 @ 08:17)  Sodium, Serum: 142 mmol/L (09-09 @ 07:04)  Sodium, Serum: 141 mmol/L (09-08 @ 06:55)  Sodium, Serum: 140 mmol/L (09-07 @ 10:56)      1.12 mg/dL 09-11 @ 07:30  1.08 mg/dL 09-10 @ 08:17  1.24 mg/dL 09-09 @ 07:04  1.61 mg/dL 09-08 @ 06:55  2.58 mg/dL 09-07 @ 10:56      Hemoglobin:  Hemoglobin: 8.3 g/dL (09-11 @ 07:30)  Hemoglobin: 7.0 g/dL (09-10 @ 08:17)  Hemoglobin: 7.3 g/dL (09-09 @ 15:06)  Hemoglobin: 7.0 g/dL (09-09 @ 07:04)  Hemoglobin: 7.7 g/dL (09-08 @ 09:03)  Hemoglobin: 7.6 g/dL (09-08 @ 06:55)  Hemoglobin: 11.6 g/dL (09-07 @ 10:38)      Platelets: Platelet Count - Automated: 247 K/uL (09-11 @ 07:30)  Platelet Count - Automated: 206 K/uL (09-10 @ 08:17)  Platelet Count - Automated: 222 K/uL (09-09 @ 15:06)  Platelet Count - Automated: 226 K/uL (09-09 @ 07:04)  Platelet Count - Automated: 289 K/uL (09-08 @ 09:03)  Platelet Count - Automated: 284 K/uL (09-08 @ 06:55)  Platelet Count - Automated: 362 K/uL (09-07 @ 10:38)      LIVER FUNCTIONS - ( 11 Sep 2021 07:30 )  Alb: 2.2 g/dL / Pro: 6.7 g/dL / ALK PHOS: 86 U/L / ALT: 45 U/L DA / AST: 38 U/L / GGT: x                 RADIOLOGY & ADDITIONAL STUDIES:

## 2021-09-11 NOTE — PROGRESS NOTE ADULT - ASSESSMENT
Impression:  1. chronic respiratory failure  2. serratia and pseudomonas pneumonia  3. anemia  4. sepsis/ septic shock -resolved  5. proteus mirabilis UTI  6. paronychia  7. diabetes mellitus      Plan:  Neuro - CTH without acute pathology, advanced dementia/CVA at baseline, TSH normal, B12 elevated, no noted abnormal mvts currently, ativan PRN, Fentanyl duragesic for analgesia    CV -  hemodynamics stable now, remains off pressors          NSR          Echo nml LV Fx    Pulm -  cont full vent support, current settings              active titration of FiO2 to keep sats>90%, currently stable o n30%              utilization of PEEP for alveolar recruitment if desats              vent bundle in place              cont IV abx for serratia and pseudomonal PNA             consideration for SBT in am    GI -  PPI BID, carafate,  Enteric feeds as tolerated, change free water flush to 40m/hr, d/c IVF    Renal - Cr stable, + fluid balance, d/c IVF, PO4 low, repleting PO, trend BMP     Heme -  no pharmacologic DVT PPx for now given guaic + stool with high risk bleeding, SCD's, H/H stable this am.    ID - afebrile, WBC normal, cont IV abx Zosyn and Linezolid as per ID, BCx NGTD, podiatry f/u for further management, d/c diflucan currently without indication apparent, f/u ID    Endo -  Aggressive glycemic control to limit FS glucose to < 180mg/dl, BS stable, cont current lantus regimen with ISS coverage, A1c 6.9

## 2021-09-11 NOTE — PROGRESS NOTE ADULT - ASSESSMENT
VIRIDIANAAMI BREA 75 f [facility] P 9/7/2021   DR ILIANA KEMP     REVIEW OF SYMPTOMS      Able to give (reliable) ROS  NO     PHYSICAL EXAM    HEENT Unremarkable  atraumatic   RESP Fair air entry EXP prolonged    Harsh breath sound Resp distres mild   CARDIAC S1 S2 No S3     NO JVD    ABDOMEN SOFT BS PRESENT NOT DISTENDED No hepatosplenomegaly   PEDAL EDEMA present No calf tenderness  NO rash        9/7/2021 PATIENT PRESENTATION.  74F PMH Frontotemporal dementia, CVA, chronic vent dependent respiratory failure, dysphagia s/p PEG, recurrent UTI/VAP Past HO sputum Pseudomonas resistant zosyn presents sp cac at [facility] nursing facility on 9/7/2021   In syo er on 9/7/2021 already given 4l ns   Pulm critical care consulted 9/7/2021     RECENT HOSPITAL STAYS   6/15-6/23 [facility] p    6/19/2021 fungitell 44  6/19 caspofungin  10/30-11/6 [facility] P      Home meds poa included meropenem bd duragesic 12 lorazepam 1.3 asa 81 lovnx 40 lantus 40 hs      9/7/2021 PRESENTING PROBLEMS.        SP CAC AT HCF FEW MIN 9/7/2021   POSSIBLE ASP PNEUMONIA poa  SHOCK poa   TRACH POA   VENT DEPENENT POA   PEG POA  DM   [facility] APHASIC STROKE       BEST PRACTICE ISSUES.                                                  HEAD OF BED ELEVATION. Yes  DVT PROPHYLAXIS.  lvnx 30 (9/7/2021) -> hpsc (9/7) -> dced 9/8 drop in hb                                         SQUIRES PROPHYLAXIS.   protonix 40 (9/7/2021)       carafate 1.4 (9/7/2021)                                                                                   DIET.     npo 9/7/2021  -> glucerna 1.5 1200 (9/7)   -> 2cal 800 (9/9)                   INFECTION PROPHYLAXIS.           chlorhexidine 2% (9/8)            chlorhexidine .12 9/9/2021     GENERAL ISSUES  GOC ADVANCED DIRECTIVE.                                         ALLERGY.                            WEIGHT.         9/7/2021 130                           BMI.                   9/7/2021  22      PATIENT DATA   TUBES  PROCEDURES.      9/7/2021 Attempted by er md andrews and r subclav unsuccessful   9/7/2021 IO   9/7/2021 vaughn  9/8/2021 C line lij  ccpa   9/9/2021 vaughn dced     PEG poa  TRACH poa     ABG.       OXYGENATION.                   VITALS/IO/VENT/DRIPS.    9/11/2021 67 100/50   9/11/2021 u 300     PROBLEM ASSESSMENT/RECOMMENDATIONS.    COVID PROFILE.  scv2 9/7/2021 (-)  spk ab 9/8 (+) 250   No current covid infection    SP CAC AT HCF FEW MIN 9/7/2021.   there was low grade troponin elevation on admission which down trended  and was likely demand ischemia   Opens eyes     POSSIBLE ASP PNEUMONIA poa.  9/7 ct basal as neum distended esophagus   w 9/11/2021 w 7.5   pr 9/11/2021 pr 2.3   mrsa 9/8/2021 (-)   urine 9/7 proteus   sputum 9/8 serratia pseudomonas   Diflucan 100 x 5d (9/7/2021)   zosyn x 7d (9/7/2021) (Dr Mcpherson) (Pneu)    linezolid 9/10/2021 x 5d (Dr Mcpherson) (paronychia)     SHOCK poa. Resolvd   lr 150 x 8 h then lr  75 (9/7/2021)   norepi 9/7/2021 12p  norepi dced 9/9/2021   C line placed 9/8/2021  target map 65 (+)    LACTICEMIA  poa.  la 9/7-9/7-9/8/2021 la 13.5-3.7- 1     TRACH POA.     VENT DEPENENT POA.   Target pH 730 (+) PO2 60 (+) po 90-95% Pplat 35 (-)   HOBE DSV DSBT  9/8/2021 No abg available resp could not get abg     COPD.  duoneb (9/7/2021)       RO MI.   Tr 9/7-9/8/2021 tr .17- .091   ASA 81 (9/7/2021)   metoprolol 25.2 (9/7/2021)     RO CHF.   bnp 9/7/2021 bnp 2391   echo 6/15/2021 n lvsf dd1     ANEMIA   Hb 9/7-9/8-9/9 -9/10-9/11 Hb 11.6-7.6- 7.3 - 7.7 - 8.3   gi on case   Drop likely sec to hydration Need to exclude abla       PEG POA.    GERD.  9/7/2021 ct cap gerd   protonix 40  9/7/2021)                                             ELEVATED LFTS POA.  LFTS 9/7/2021  ap 122  ast 205  alt 81                                        COLON ILEUS ON CTAP 9/7/2021.   bisacodyl 10 (9/7/2021)   miralax (9/7/2021)   tolerating tf    RYAN poa.  Cr 9/7-9/8-9/9-9/10/2021 Cr 2.5- 1.6- 1.2 - 1   lr 150 x 8 h then ns 75 (9/7/2021)     DM.   insulin     PMH APHASIC STROKE.   AC R SPHENOID SINUSITIS 9/7/2021 CT.   ct head 9/7/2021 No ac poss ac r sphenoid sinusitis    PAIN.   fentnyl 12 (9/7/2021)    ANXIETY.   lorazepam 1.3 (9/7/2021)     SEIZURES 9/9/2021   Given lorazepam 4 on 9/9/2021   Neuro called 9/9/2021   methocarbamol 250 started by neuro 9/11/2021     TOES LESION NOTED 9/10/2021   9/11/2021 Podiatry saw           OVERALL PLAN.  SP CAC C monitor C enz Cardio on case    VENT Vent bndle  ASP PNEUM bsab  RYAN Hydration monitor Improvd   SHOCK  target pap 65 (+)  DROP IN HB 9/8/2021 Likely sec fluids gi on case no ongoing gi bl suspectd no egd plannd    SZ on anticonvulsants (9/9/2021)   TOES lesion noted 9/10/2021 seen by podiatry 9/11/2021       TIME SPENT   Over 36 minutes aggregate critical care time spent on encounter; activities included   direct patient care, counseling and/or coordinating care reviewing notes, lab data/ imaging , discussion with multidisciplinary team/ patient  /family and explaining in detail risks, benefits, alternatives  of the recommendations     CHAPINCITO GUIDRY 75 f [facility] P 9/7/2021   DR ILIANA KEMP

## 2021-09-11 NOTE — CONSULT NOTE ADULT - CONSULT REQUESTED DATE/TIME
07-Sep-2021
07-Sep-2021 12:07
08-Sep-2021 10:44
11-Sep-2021 14:55
11-Sep-2021 14:40
07-Sep-2021 12:54

## 2021-09-11 NOTE — PROGRESS NOTE ADULT - SUBJECTIVE AND OBJECTIVE BOX
Neurology follow up note    BREA [id number]      Interval History:    Patient feels ok no new complaints.    Allergies    codeine (Hives)    Intolerances        MEDICATIONS    acetaminophen   Tablet .. 650 milliGRAM(s) Oral every 6 hours PRN  albuterol/ipratropium for Nebulization 3 milliLiter(s) Nebulizer every 6 hours  aspirin  chewable 81 milliGRAM(s) Enteral Tube daily  bisacodyl Suppository 10 milliGRAM(s) Rectal at bedtime  chlorhexidine 0.12% Liquid 15 milliLiter(s) Oral Mucosa two times a day  chlorhexidine 2% Cloths 1 Application(s) Topical daily  dextrose 40% Gel 15 Gram(s) Oral once  dextrose 5%. 1000 milliLiter(s) IV Continuous <Continuous>  dextrose 5%. 1000 milliLiter(s) IV Continuous <Continuous>  dextrose 50% Injectable 25 Gram(s) IV Push once  dextrose 50% Injectable 12.5 Gram(s) IV Push once  dextrose 50% Injectable 25 Gram(s) IV Push once  fentaNYL   Patch  12 MICROgram(s)/Hr 1 Patch Transdermal every 72 hours  fluconAZOLE   Tablet 100 milliGRAM(s) Oral daily  glucagon  Injectable 1 milliGRAM(s) IntraMuscular once  insulin glargine Injectable (LANTUS) 36 Unit(s) SubCutaneous at bedtime  insulin lispro (ADMELOG) corrective regimen sliding scale   SubCutaneous every 6 hours  iron sucrose Injectable 100 milliGRAM(s) IV Push every 24 hours  lactated ringers. 1000 milliLiter(s) IV Continuous <Continuous>  lactobacillus acidophilus 2 Tablet(s) Oral daily  linezolid    Tablet 600 milliGRAM(s) Oral every 12 hours  LORazepam   Injectable 1 milliGRAM(s) IV Push every 2 hours PRN  LORazepam   Injectable 0.5 milliGRAM(s) IV Push three times a day  pantoprazole  Injectable 40 milliGRAM(s) IV Push two times a day  piperacillin/tazobactam IVPB.. 3.375 Gram(s) IV Intermittent every 12 hours  polyethylene glycol 3350 17 Gram(s) Oral daily  potassium phosphate / sodium phosphate Powder (PHOS-NaK) 1 Packet(s) Oral two times a day  senna 2 Tablet(s) Oral at bedtime  simethicone 80 milliGRAM(s) Chew every 6 hours  sodium chloride 0.9% lock flush 10 milliLiter(s) IV Push every 1 hour PRN  sucralfate suspension 1 Gram(s) Oral four times a day              Vital Signs Last 24 Hrs  T(C): 36.7 (11 Sep 2021 08:17), Max: 37.1 (10 Sep 2021 16:10)  T(F): 98.1 (11 Sep 2021 08:17), Max: 98.8 (10 Sep 2021 16:10)  HR: 70 (11 Sep 2021 10:00) (65 - 118)  BP: 104/50 (11 Sep 2021 10:00) (83/46 - 152/57)  BP(mean): 68 (11 Sep 2021 10:00) (58 - 86)  RR: 12 (11 Sep 2021 10:00) (9 - 47)  SpO2: 100% (11 Sep 2021 10:00) (94% - 100%)        REVIEW OF SYSTEMS:  Could not be obtained secondary to the patient being nonverbal.  As per my conversation with the spouse, a gradual process along with underlying strokes causing her to become bed-bound.    PHYSICAL EXAMINATION:    Head:  Normocephalic, atraumatic.  Eyes:  No scleral icterus.  Ears:  Cannot be evaluated secondary to the patient being nonverbal.  NECK:  Tracheostomy was in place.  RESPIRATORY:  Good air entry bilaterally.  CARDIOVASCULAR:  S1 and S2 heard.  ABDOMEN:  Soft and nontender.  EXTREMITIES:  No clubbing or cyanosis were noted.      NEUROLOGIC:  The patient was awake in bed.  At present no movement.  does have a history upon stimulating the patient, her eyes did roll up.  Her body started to stiffen with abnormal mouth movements, lasted one to two minutes history of this  Pupils bilaterally were 3 mm, reactive to 2 mm.  The patient has her arms in a flexed position with both hands contracted.  Bilateral lower extremities were in a straight position.  The patient has increased tone, bilateral upper and lower extremities.                 LABS:  CBC Full  -  ( 11 Sep 2021 07:30 )  WBC Count : 7.52 K/uL  RBC Count : 3.12 M/uL  Hemoglobin : 8.3 g/dL  Hematocrit : 27.8 %  Platelet Count - Automated : 247 K/uL  Mean Cell Volume : 89.1 fl  Mean Cell Hemoglobin : 26.6 pg  Mean Cell Hemoglobin Concentration : 29.9 gm/dL  Auto Neutrophil # : x  Auto Lymphocyte # : x  Auto Monocyte # : x  Auto Eosinophil # : x  Auto Basophil # : x  Auto Neutrophil % : x  Auto Lymphocyte % : x  Auto Monocyte % : x  Auto Eosinophil % : x  Auto Basophil % : x      09-11    141  |  107  |  18  ----------------------------<  132<H>  4.4   |  20<L>  |  1.12    Ca    8.8      11 Sep 2021 07:30  Phos  2.0     09-11  Mg     1.7     09-11    TPro  6.7  /  Alb  2.2<L>  /  TBili  0.3  /  DBili  x   /  AST  38  /  ALT  45  /  AlkPhos  86  09-11    Hemoglobin A1C:     LIVER FUNCTIONS - ( 11 Sep 2021 07:30 )  Alb: 2.2 g/dL / Pro: 6.7 g/dL / ALK PHOS: 86 U/L / ALT: 45 U/L DA / AST: 38 U/L / GGT: x           Vitamin B12   PT/INR - ( 11 Sep 2021 07:30 )   PT: 12.7 sec;   INR: 1.05 ratio               RADIOLOGY      ANALYSIS AND PLAN:  This is a 75-year-old with an episode of cardiac arrest and abnormal movements.  For episodes of cardiac arrest, continue to monitor heart rate on telemetry.  If possible, please maintain a systolic blood pressure greater than 100, to help maintain cerebral perfusion.  For history of abnormal movements, as per my conversation with the spouse, this is a known condition that happens to her, as per him with GI-related issues.  She will start to hyperventilate, eyes will roll up, will have abnormal mouth movement, and tried to move from side-to-side.  She has undergone extensive EEG monitoring and has captured these events on numerous occasions and deemed to be non-epileptiform.  For now, I would recommend supportive therapy.  No antiepileptic medications.  For history of diabetes, strict control of blood sugars.  For history of cerebrovascular accident, continue the patient on her home medications.  spoke to spouse agreeable to try muscle relaxant     The spouse's name is Marciano.  His telephone number is [phone] 9/11.  I had a lengthy discussion, we will not be starting any antiepileptic medications.    Greater than 40 minutes of time was spent with the patient, plan of care, reviewing data, speaking to the multidisciplinary healthcare team with greater than 50% of that time in counseling and care coordination.

## 2021-09-11 NOTE — PROGRESS NOTE ADULT - SUBJECTIVE AND OBJECTIVE BOX
Patient is a 75y old  Female who presents with a chief complaint of Unresponsive, post-cardiac arrest (11 Sep 2021 06:21)      INTERVAL HPI/OVERNIGHT EVENTS:  Pt is seen and examined.  Unable to obtain history due to underlying medical condition.    Pain Location & Control:     MEDICATIONS  (STANDING):  albuterol/ipratropium for Nebulization 3 milliLiter(s) Nebulizer every 6 hours  aspirin  chewable 81 milliGRAM(s) Enteral Tube daily  bisacodyl Suppository 10 milliGRAM(s) Rectal at bedtime  chlorhexidine 0.12% Liquid 15 milliLiter(s) Oral Mucosa two times a day  chlorhexidine 2% Cloths 1 Application(s) Topical daily  dextrose 40% Gel 15 Gram(s) Oral once  dextrose 5%. 1000 milliLiter(s) (50 mL/Hr) IV Continuous <Continuous>  dextrose 5%. 1000 milliLiter(s) (100 mL/Hr) IV Continuous <Continuous>  dextrose 50% Injectable 25 Gram(s) IV Push once  dextrose 50% Injectable 12.5 Gram(s) IV Push once  dextrose 50% Injectable 25 Gram(s) IV Push once  fentaNYL   Patch  12 MICROgram(s)/Hr 1 Patch Transdermal every 72 hours  fluconAZOLE   Tablet 100 milliGRAM(s) Oral daily  glucagon  Injectable 1 milliGRAM(s) IntraMuscular once  insulin glargine Injectable (LANTUS) 36 Unit(s) SubCutaneous at bedtime  insulin lispro (ADMELOG) corrective regimen sliding scale   SubCutaneous every 6 hours  iron sucrose Injectable 100 milliGRAM(s) IV Push every 24 hours  lactated ringers. 1000 milliLiter(s) (75 mL/Hr) IV Continuous <Continuous>  lactobacillus acidophilus 2 Tablet(s) Oral daily  linezolid    Tablet 600 milliGRAM(s) Oral every 12 hours  LORazepam   Injectable 0.5 milliGRAM(s) IV Push three times a day  pantoprazole  Injectable 40 milliGRAM(s) IV Push two times a day  piperacillin/tazobactam IVPB.. 3.375 Gram(s) IV Intermittent every 12 hours  polyethylene glycol 3350 17 Gram(s) Oral daily  senna 2 Tablet(s) Oral at bedtime  simethicone 80 milliGRAM(s) Chew every 6 hours  sucralfate suspension 1 Gram(s) Oral four times a day    MEDICATIONS  (PRN):  acetaminophen   Tablet .. 650 milliGRAM(s) Oral every 6 hours PRN Temp greater or equal to 38C (100.4F), Mild Pain (1 - 3)  LORazepam   Injectable 1 milliGRAM(s) IV Push every 2 hours PRN Agitation  sodium chloride 0.9% lock flush 10 milliLiter(s) IV Push every 1 hour PRN Pre/post blood products, medications, blood draw, and to maintain line patency      Allergies    codeine (Hives)    Intolerances            Vital Signs Last 24 Hrs  T(C): 36.7 (11 Sep 2021 08:17), Max: 37.1 (10 Sep 2021 16:10)  T(F): 98.1 (11 Sep 2021 08:17), Max: 98.8 (10 Sep 2021 16:10)  HR: 73 (11 Sep 2021 08:00) (65 - 118)  BP: 104/52 (11 Sep 2021 08:00) (83/46 - 152/57)  BP(mean): 68 (11 Sep 2021 08:00) (58 - 86)  RR: 12 (11 Sep 2021 08:00) (9 - 47)  SpO2: 100% (11 Sep 2021 08:00) (94% - 100%)        LABS:                        8.3    7.52  )-----------( 247      ( 11 Sep 2021 07:30 )             27.8     11 Sep 2021 07:30    141    |  107    |  18     ----------------------------<  132    4.4     |  20     |  1.12     Ca    8.8        11 Sep 2021 07:30  Phos  2.0       11 Sep 2021 07:30  Mg     1.7       11 Sep 2021 07:30    TPro  6.7    /  Alb  2.2    /  TBili  0.3    /  DBili  x      /  AST  38     /  ALT  45     /  AlkPhos  86     11 Sep 2021 07:30        CAPILLARY BLOOD GLUCOSE      POCT Blood Glucose.: 160 mg/dL (11 Sep 2021 05:16)  POCT Blood Glucose.: 176 mg/dL (10 Sep 2021 23:46)  POCT Blood Glucose.: 158 mg/dL (10 Sep 2021 21:45)  POCT Blood Glucose.: 127 mg/dL (10 Sep 2021 18:33)  POCT Blood Glucose.: 177 mg/dL (10 Sep 2021 12:33)        Cultures  Culture Results:   Numerous Serratia marcescens  Numerous Pseudomonas aeruginosa  Normal Respiratory Elvira present (09-08 @ 13:17)  Culture Results:   No growth to date. (09-07 @ 15:14)  Culture Results:   No growth to date. (09-07 @ 15:14)  Culture Results:   >100,000 CFU/ml Proteus mirabilis (09-07 @ 15:14)        Culture - Sputum (collected 09-08-21 @ 13:17)  Source: .Sputum Sputum  Gram Stain (09-08-21 @ 15:32):    Moderate polymorphonuclear leukocytes per low power field    Few Squamous epithelial cells per low power field    Moderate Gram Positive Rods per oil power field    Moderate Gram Negative Rods per oil power field    Few Yeast like cells per oil power field    Moderate Gram Negative Diplococci per oil power field  Final Report (09-10-21 @ 13:43):    Numerous Serratia marcescens    Numerous Pseudomonas aeruginosa    Normal Respiratory Elvira present  Organism: Serratia marcescens  Pseudomonas aeruginosa (09-10-21 @ 13:43)  Organism: Pseudomonas aeruginosa (09-10-21 @ 13:43)      -  Amikacin: S <=16      -  Aztreonam: S <=4      -  Cefepime: S <=2      -  Ceftazidime: S <=1      -  Ciprofloxacin: I 1      -  Gentamicin: R >8      -  Imipenem: S <=1      -  Levofloxacin: S <=0.5      -  Meropenem: S <=1      -  Piperacillin/Tazobactam: S <=8      -  Tobramycin: I 8      Method Type: PRATIK  Organism: Serratia marcescens (09-10-21 @ 13:43)      -  Amikacin: S <=16      -  Amoxicillin/Clavulanic Acid: R >16/8      -  Ampicillin: R >16 These ampicillin results predict results for amoxicillin      -  Ampicillin/Sulbactam: R >16/8 Enterobacter, Citrobacter, and Serratia may develop resistance during prolonged therapy (3-4 days)      -  Aztreonam: S <=4      -  Cefazolin: R >16 Enterobacter, Citrobacter, and Serratia may develop resistance during prolonged therapy (3-4 days)      -  Cefepime: S <=2      -  Cefoxitin: R <=8      -  Ceftriaxone: I 2 Enterobacter, Citrobacter, and Serratia may develop resistance during prolonged therapy      -  Ciprofloxacin: R >2      -  Ertapenem: S <=0.5      -  Gentamicin: S <=2      -  Levofloxacin: R 2      -  Meropenem: S <=1      -  Piperacillin/Tazobactam: S <=8      -  Tobramycin: S <=2      -  Trimethoprim/Sulfamethoxazole: S <=0.5/9.5      Method Type: PRATIK    Culture - Urine (collected 09-07-21 @ 15:14)  Source: Clean Catch Clean Catch (Midstream)  Final Report (09-09-21 @ 08:00):    >100,000 CFU/ml Proteus mirabilis  Organism: Proteus mirabilis (09-09-21 @ 08:00)  Organism: Proteus mirabilis (09-09-21 @ 08:00)      -  Amikacin: S <=16      -  Amoxicillin/Clavulanic Acid: S <=8/4      -  Ampicillin: S <=8 These ampicillin results predict results for amoxicillin      -  Ampicillin/Sulbactam: S <=4/2 Enterobacter, Citrobacter, and Serratia may develop resistance during prolonged therapy (3-4 days)      -  Aztreonam: S <=4      -  Cefazolin: S 4 (MIC_[id number]) For uncomplicated UTI with K. pneumoniae, E. coli, or P. mirablis: PRATIK <=16 is sensitive and PRATIK >=32 is resistant. This also predicts results for oral agents cefaclor, cefdinir, cefpodoxime, cefprozil, cefuroxime axetil, cephalexin and locarbef for uncomplicated UTI. Note that some isolates may be susceptible to these agents while testing resistant to cefazolin.      -  Cefepime: S <=2      -  Cefoxitin: S <=8      -  Ceftriaxone: S <=1 Enterobacter, Citrobacter, and Serratia may develop resistance during prolonged therapy      -  Ciprofloxacin: R >2      -  Ertapenem: S <=0.5      -  Gentamicin: S 4      -  Levofloxacin: R 2      -  Meropenem: S <=1      -  Nitrofurantoin: R >64 Should not be used to treat pyelonephritis      -  Piperacillin/Tazobactam: S <=8      -  Tobramycin: S 4      -  Trimethoprim/Sulfamethoxazole: R >2/38      Method Type: PRATIK    Culture - Blood (collected 09-07-21 @ 15:14)  Source: .Blood Blood-Peripheral  Preliminary Report (09-08-21 @ 16:01):    No growth to date.    Culture - Blood (collected 09-07-21 @ 15:14)  Source: .Blood Blood-Peripheral  Preliminary Report (09-08-21 @ 16:01):    No growth to date.        RADIOLOGY & ADDITIONAL TESTS:    Imaging Personally Reviewed:  [ ] YES  [ ] NO    Consultant(s) Notes Reviewed:  [ ] YES  [ ] NO    Care Discussed with Consultants/Other Providers [x ] YES  [ ] NO

## 2021-09-11 NOTE — PROGRESS NOTE ADULT - ASSESSMENT
76 yo f pmhx dementia, CVA, VDRF s/p trach/peg, quadriplegia, DM, HTN, GERD, constipation admitted with   s/p card arrest - sepsis - shock - acute infection - dementia - chr resp failure    neuro notes reviewed - vs noted -     ID f/u noted  ABX for 5 days total    GOC discussion  pt is full code   is the HCP  on TF  on NEBS  on ABX rx regimen  assist with needs - ADL  HOB elev - orgal hygiene - skin care  prognosis guarded to poor

## 2021-09-11 NOTE — CHART NOTE - NSCHARTNOTEFT_GEN_A_CORE
Assessment:     Factors impacting intake: [ ] none [ ] nausea  [ ] vomiting [ ] diarrhea [ ] constipation  [ ]chewing problems [ ] swallowing issues  [ ] other:     Diet Presciption: Diet, NPO with Tube Feed:   Tube Feeding Modality: Gastrostomy  TwoCal HN  Total Volume for 24 Hours (mL): 800  Continuous  Starting Tube Feed Rate {mL per Hour}: 20  Increase Tube Feed Rate by (mL): 10     Every 6 hours  Until Goal Tube Feed Rate (mL per Hour): 40  Tube Feed Duration (in Hours): 20  Tube Feed Start Time: 12:00 (09-09-21 @ 11:09)    Intake:     Current Weight: Weight (kg): 50 (09-07 @ 15:10)  % Weight Change    Pertinent Medications: MEDICATIONS  (STANDING):  albuterol/ipratropium for Nebulization 3 milliLiter(s) Nebulizer every 6 hours  aspirin  chewable 81 milliGRAM(s) Enteral Tube daily  bisacodyl Suppository 10 milliGRAM(s) Rectal at bedtime  chlorhexidine 0.12% Liquid 15 milliLiter(s) Oral Mucosa two times a day  chlorhexidine 2% Cloths 1 Application(s) Topical daily  dextrose 40% Gel 15 Gram(s) Oral once  dextrose 5%. 1000 milliLiter(s) (50 mL/Hr) IV Continuous <Continuous>  dextrose 5%. 1000 milliLiter(s) (100 mL/Hr) IV Continuous <Continuous>  dextrose 50% Injectable 25 Gram(s) IV Push once  dextrose 50% Injectable 12.5 Gram(s) IV Push once  dextrose 50% Injectable 25 Gram(s) IV Push once  fentaNYL   Patch  12 MICROgram(s)/Hr 1 Patch Transdermal every 72 hours  fluconAZOLE   Tablet 100 milliGRAM(s) Oral daily  glucagon  Injectable 1 milliGRAM(s) IntraMuscular once  insulin glargine Injectable (LANTUS) 36 Unit(s) SubCutaneous at bedtime  insulin lispro (ADMELOG) corrective regimen sliding scale   SubCutaneous every 6 hours  iron sucrose Injectable 100 milliGRAM(s) IV Push every 24 hours  lactated ringers. 1000 milliLiter(s) (75 mL/Hr) IV Continuous <Continuous>  lactobacillus acidophilus 2 Tablet(s) Oral daily  linezolid    Tablet 600 milliGRAM(s) Oral every 12 hours  LORazepam   Injectable 0.5 milliGRAM(s) IV Push three times a day  pantoprazole  Injectable 40 milliGRAM(s) IV Push two times a day  piperacillin/tazobactam IVPB.. 3.375 Gram(s) IV Intermittent every 12 hours  polyethylene glycol 3350 17 Gram(s) Oral daily  potassium phosphate / sodium phosphate Powder (PHOS-NaK) 1 Packet(s) Oral two times a day  senna 2 Tablet(s) Oral at bedtime  simethicone 80 milliGRAM(s) Chew every 6 hours  sucralfate suspension 1 Gram(s) Oral four times a day    MEDICATIONS  (PRN):  acetaminophen   Tablet .. 650 milliGRAM(s) Oral every 6 hours PRN Temp greater or equal to 38C (100.4F), Mild Pain (1 - 3)  LORazepam   Injectable 1 milliGRAM(s) IV Push every 2 hours PRN Agitation  sodium chloride 0.9% lock flush 10 milliLiter(s) IV Push every 1 hour PRN Pre/post blood products, medications, blood draw, and to maintain line patency    Pertinent Labs: 09-11 Na141 mmol/L Glu 132 mg/dL<H> K+ 4.4 mmol/L Cr  1.12 mg/dL BUN 18 mg/dL 09-11 Phos 2.0 mg/dL<L> 09-11 Alb 2.2 g/dL<L>     CAPILLARY BLOOD GLUCOSE      POCT Blood Glucose.: 160 mg/dL (11 Sep 2021 05:16)  POCT Blood Glucose.: 176 mg/dL (10 Sep 2021 23:46)  POCT Blood Glucose.: 158 mg/dL (10 Sep 2021 21:45)  POCT Blood Glucose.: 127 mg/dL (10 Sep 2021 18:33)  POCT Blood Glucose.: 177 mg/dL (10 Sep 2021 12:33)    Skin:     Estimated Needs:   [ ] no change since previous assessment  [ ] recalculated:     Previous Nutrition Diagnosis:   [ ] Inadequate Energy Intake [ ]Inadequate Oral Intake [ ] Excessive Energy Intake   [ ] Underweight [ ] Increased Nutrient Needs [ ] Overweight/Obesity   [ ] Altered GI Function [ ] Unintended Weight Loss [ ] Food & Nutrition Related Knowledge Deficit [ ] Malnutrition     Nutrition Diagnosis is [ ] ongoing  [ ] resolved [ ] not applicable     New Nutrition Diagnosis: [ ] not applicable       Interventions:   Recommend  [ ] Change Diet To:  [ ] Nutrition Supplement  [ ] Nutrition Support  [ ] Other:     Monitoring and Evaluation:   [ ] PO intake [ x ] Tolerance to diet prescription [ x ] weights [ x ] labs[ x ] follow up per protocol  [ ] other: Assessment: 75F Chronic respiratory failure trach/vent dependent, with PEG tube, functional quadriplegia/total care patient, DM2, HTN, GERD, CVA, dementia, constipation p/w being found unresponsive at [facility], brought in by EMS for post cardiac arrest care, found to have sepsis, b/l aspiration PNA, and mild colonic ileus        Factors impacting intake: [ ] none [ ] nausea  [ ] vomiting [ ] diarrhea [ ] constipation  [ ]chewing problems [ ] swallowing issues  [ ] other:     Diet Prescription: Diet, NPO with Tube Feed:   Tube Feeding Modality: Gastrostomy  TwoCal HN  Total Volume for 24 Hours (mL): 800  Continuous  Starting Tube Feed Rate {mL per Hour}: 20  Increase Tube Feed Rate by (mL): 10     Every 6 hours  Until Goal Tube Feed Rate (mL per Hour): 40  Tube Feed Duration (in Hours): 20  Tube Feed Start Time: 12:00 (09-09-21 @ 11:09)    Intake:     Current Weight: Weight (kg): 50 (09-07 @ 15:10)  % Weight Change    Pertinent Medications: MEDICATIONS  (STANDING):  albuterol/ipratropium for Nebulization 3 milliLiter(s) Nebulizer every 6 hours  aspirin  chewable 81 milliGRAM(s) Enteral Tube daily  bisacodyl Suppository 10 milliGRAM(s) Rectal at bedtime  chlorhexidine 0.12% Liquid 15 milliLiter(s) Oral Mucosa two times a day  chlorhexidine 2% Cloths 1 Application(s) Topical daily  dextrose 40% Gel 15 Gram(s) Oral once  dextrose 5%. 1000 milliLiter(s) (50 mL/Hr) IV Continuous <Continuous>  dextrose 5%. 1000 milliLiter(s) (100 mL/Hr) IV Continuous <Continuous>  dextrose 50% Injectable 25 Gram(s) IV Push once  dextrose 50% Injectable 12.5 Gram(s) IV Push once  dextrose 50% Injectable 25 Gram(s) IV Push once  fentaNYL   Patch  12 MICROgram(s)/Hr 1 Patch Transdermal every 72 hours  fluconAZOLE   Tablet 100 milliGRAM(s) Oral daily  glucagon  Injectable 1 milliGRAM(s) IntraMuscular once  insulin glargine Injectable (LANTUS) 36 Unit(s) SubCutaneous at bedtime  insulin lispro (ADMELOG) corrective regimen sliding scale   SubCutaneous every 6 hours  iron sucrose Injectable 100 milliGRAM(s) IV Push every 24 hours  lactated ringers. 1000 milliLiter(s) (75 mL/Hr) IV Continuous <Continuous>  lactobacillus acidophilus 2 Tablet(s) Oral daily  linezolid    Tablet 600 milliGRAM(s) Oral every 12 hours  LORazepam   Injectable 0.5 milliGRAM(s) IV Push three times a day  pantoprazole  Injectable 40 milliGRAM(s) IV Push two times a day  piperacillin/tazobactam IVPB.. 3.375 Gram(s) IV Intermittent every 12 hours  polyethylene glycol 3350 17 Gram(s) Oral daily  potassium phosphate / sodium phosphate Powder (PHOS-NaK) 1 Packet(s) Oral two times a day  senna 2 Tablet(s) Oral at bedtime  simethicone 80 milliGRAM(s) Chew every 6 hours  sucralfate suspension 1 Gram(s) Oral four times a day    MEDICATIONS  (PRN):  acetaminophen   Tablet .. 650 milliGRAM(s) Oral every 6 hours PRN Temp greater or equal to 38C (100.4F), Mild Pain (1 - 3)  LORazepam   Injectable 1 milliGRAM(s) IV Push every 2 hours PRN Agitation  sodium chloride 0.9% lock flush 10 milliLiter(s) IV Push every 1 hour PRN Pre/post blood products, medications, blood draw, and to maintain line patency    Pertinent Labs: 09-11 Na141 mmol/L Glu 132 mg/dL<H> K+ 4.4 mmol/L Cr  1.12 mg/dL BUN 18 mg/dL 09-11 Phos 2.0 mg/dL<L> 09-11 Alb 2.2 g/dL<L>     CAPILLARY BLOOD GLUCOSE      POCT Blood Glucose.: 160 mg/dL (11 Sep 2021 05:16)  POCT Blood Glucose.: 176 mg/dL (10 Sep 2021 23:46)  POCT Blood Glucose.: 158 mg/dL (10 Sep 2021 21:45)  POCT Blood Glucose.: 127 mg/dL (10 Sep 2021 18:33)  POCT Blood Glucose.: 177 mg/dL (10 Sep 2021 12:33)    Skin:     Estimated Needs:   [ ] no change since previous assessment  [ ] recalculated:     Previous Nutrition Diagnosis:   [ ] Inadequate Energy Intake [ ]Inadequate Oral Intake [ ] Excessive Energy Intake   [ ] Underweight [ ] Increased Nutrient Needs [ ] Overweight/Obesity   [ ] Altered GI Function [ ] Unintended Weight Loss [ ] Food & Nutrition Related Knowledge Deficit [ ] Malnutrition     Nutrition Diagnosis is [ ] ongoing  [ ] resolved [ ] not applicable     New Nutrition Diagnosis: [ ] not applicable       Interventions:   Recommend  [ ] Change Diet To:  [ ] Nutrition Supplement  [ ] Nutrition Support  [ ] Other:     Monitoring and Evaluation:   [ ] PO intake [ x ] Tolerance to diet prescription [ x ] weights [ x ] labs[ x ] follow up per protocol  [ ] other: Assessment: 75F Chronic respiratory failure trach/vent dependent, with PEG tube, functional quadriplegia/total care patient, DM2, HTN, GERD, CVA, dementia, constipation p/w being found unresponsive at [facility], brought in by EMS for post cardiac arrest care, found to have sepsis, b/l aspiration PNA, and mild colonic ileus. Per staff, pt tolerating current TF regimen well ( provides 1600 kcals and 67 gm pro)         Factors impacting intake: [ ] none [ ] nausea  [ ] vomiting [ ] diarrhea [ ] constipation  [ ]chewing problems [X ] swallowing issues  [ ] other:     Diet Prescription: Diet, NPO with Tube Feed:   Tube Feeding Modality: Gastrostomy  TwoCal HN  Total Volume for 24 Hours (mL): 800  Continuous  Starting Tube Feed Rate {mL per Hour}: 20  Increase Tube Feed Rate by (mL): 10     Every 6 hours  Until Goal Tube Feed Rate (mL per Hour): 40  Tube Feed Duration (in Hours): 20  Tube Feed Start Time: 12:00 (09-09-21 @ 11:09)    Intake: n/a    Current Weight: Weight (kg): 50 (09-07 @ 15:10)   9/11/21 wt 54.2 kg   % Weight Change : most unlikely res has gained 4.2 kg in 4 days, wt change likely r/t fluid shifts or accuracy in weighing     Pertinent Medications: MEDICATIONS  (STANDING):  albuterol/ipratropium for Nebulization 3 milliLiter(s) Nebulizer every 6 hours  aspirin  chewable 81 milliGRAM(s) Enteral Tube daily  bisacodyl Suppository 10 milliGRAM(s) Rectal at bedtime  chlorhexidine 0.12% Liquid 15 milliLiter(s) Oral Mucosa two times a day  chlorhexidine 2% Cloths 1 Application(s) Topical daily  dextrose 40% Gel 15 Gram(s) Oral once  dextrose 5%. 1000 milliLiter(s) (50 mL/Hr) IV Continuous <Continuous>  dextrose 5%. 1000 milliLiter(s) (100 mL/Hr) IV Continuous <Continuous>  dextrose 50% Injectable 25 Gram(s) IV Push once  dextrose 50% Injectable 12.5 Gram(s) IV Push once  dextrose 50% Injectable 25 Gram(s) IV Push once  fentaNYL   Patch  12 MICROgram(s)/Hr 1 Patch Transdermal every 72 hours  fluconAZOLE   Tablet 100 milliGRAM(s) Oral daily  glucagon  Injectable 1 milliGRAM(s) IntraMuscular once  insulin glargine Injectable (LANTUS) 36 Unit(s) SubCutaneous at bedtime  insulin lispro (ADMELOG) corrective regimen sliding scale   SubCutaneous every 6 hours  iron sucrose Injectable 100 milliGRAM(s) IV Push every 24 hours  lactated ringers. 1000 milliLiter(s) (75 mL/Hr) IV Continuous <Continuous>  lactobacillus acidophilus 2 Tablet(s) Oral daily  linezolid    Tablet 600 milliGRAM(s) Oral every 12 hours  LORazepam   Injectable 0.5 milliGRAM(s) IV Push three times a day  pantoprazole  Injectable 40 milliGRAM(s) IV Push two times a day  piperacillin/tazobactam IVPB.. 3.375 Gram(s) IV Intermittent every 12 hours  polyethylene glycol 3350 17 Gram(s) Oral daily  potassium phosphate / sodium phosphate Powder (PHOS-NaK) 1 Packet(s) Oral two times a day  senna 2 Tablet(s) Oral at bedtime  simethicone 80 milliGRAM(s) Chew every 6 hours  sucralfate suspension 1 Gram(s) Oral four times a day    MEDICATIONS  (PRN):  acetaminophen   Tablet .. 650 milliGRAM(s) Oral every 6 hours PRN Temp greater or equal to 38C (100.4F), Mild Pain (1 - 3)  LORazepam   Injectable 1 milliGRAM(s) IV Push every 2 hours PRN Agitation  sodium chloride 0.9% lock flush 10 milliLiter(s) IV Push every 1 hour PRN Pre/post blood products, medications, blood draw, and to maintain line patency    Pertinent Labs: 09-11 Na141 mmol/L Glu 132 mg/dL<H> K+ 4.4 mmol/L Cr  1.12 mg/dL BUN 18 mg/dL 09-11 Phos 2.0 mg/dL<L> 09-11 Alb 2.2 g/dL<L>     CAPILLARY BLOOD GLUCOSE      POCT Blood Glucose.: 160 mg/dL (11 Sep 2021 05:16)  POCT Blood Glucose.: 176 mg/dL (10 Sep 2021 23:46)  POCT Blood Glucose.: 158 mg/dL (10 Sep 2021 21:45)  POCT Blood Glucose.: 127 mg/dL (10 Sep 2021 18:33)  POCT Blood Glucose.: 177 mg/dL (10 Sep 2021 12:33)    Skin: none    Estimated Needs:   [ X] no change since previous assessment  [ ] recalculated:     Previous Nutrition Diagnosis:   [ ] Inadequate Energy Intake [ ]Inadequate Oral Intake [ ] Excessive Energy Intake   [ ] Underweight [ ] Increased Nutrient Needs [ ] Overweight/Obesity   [ ] Altered GI Function [ ] Unintended Weight Loss [ ] Food & Nutrition Related Knowledge Deficit [ ] Malnutrition   [x] swallow difficulty       Nutrition Diagnosis is [ X] ongoing  [ ] resolved [ ] not applicable     New Nutrition Diagnosis: [ ] not applicable       Interventions:   Recommend  [ ] Change Diet To:  [ ] Nutrition Supplement  [ ] Nutrition Support  [ ] Other:     Monitoring and Evaluation:   [ ] PO intake [ x ] Tolerance to diet prescription [ x ] weights [ x ] labs[ x ] follow up per protocol  [ ] other: Assessment: 75F Chronic respiratory failure trach/vent dependent, with PEG tube, functional quadriplegia/total care patient, DM2, HTN, GERD, CVA, dementia, constipation p/w being found unresponsive at [facility], brought in by EMS for post cardiac arrest care, found to have sepsis, b/l aspiration PNA, and mild colonic ileus. Per staff, pt tolerating current TF regimen well ( provides 1600 kcals and 67 gm pro) Based on intial assessment, res requires 72-82 gm fpro/day or 1.4-1.6 gm pro/kg. Therefore no carb prosource is being recommended to ensure adeq pro intake         Factors impacting intake: [ ] none [ ] nausea  [ ] vomiting [ ] diarrhea [ ] constipation  [ ]chewing problems [X ] swallowing issues  [ ] other:     Diet Prescription: Diet, NPO with Tube Feed:   Tube Feeding Modality: Gastrostomy  TwoCal HN  Total Volume for 24 Hours (mL): 800  Continuous  Starting Tube Feed Rate {mL per Hour}: 20  Increase Tube Feed Rate by (mL): 10     Every 6 hours  Until Goal Tube Feed Rate (mL per Hour): 40  Tube Feed Duration (in Hours): 20  Tube Feed Start Time: 12:00 (09-09-21 @ 11:09)    Intake: n/a    Current Weight: Weight (kg): 50 (09-07 @ 15:10)   9/11/21 wt 54.2 kg   % Weight Change : most unlikely res has gained 4.2 kg in 4 days, wt change likely r/t fluid shifts or accuracy in weighing     Pertinent Medications: MEDICATIONS  (STANDING):  albuterol/ipratropium for Nebulization 3 milliLiter(s) Nebulizer every 6 hours  aspirin  chewable 81 milliGRAM(s) Enteral Tube daily  bisacodyl Suppository 10 milliGRAM(s) Rectal at bedtime  chlorhexidine 0.12% Liquid 15 milliLiter(s) Oral Mucosa two times a day  chlorhexidine 2% Cloths 1 Application(s) Topical daily  dextrose 40% Gel 15 Gram(s) Oral once  dextrose 5%. 1000 milliLiter(s) (50 mL/Hr) IV Continuous <Continuous>  dextrose 5%. 1000 milliLiter(s) (100 mL/Hr) IV Continuous <Continuous>  dextrose 50% Injectable 25 Gram(s) IV Push once  dextrose 50% Injectable 12.5 Gram(s) IV Push once  dextrose 50% Injectable 25 Gram(s) IV Push once  fentaNYL   Patch  12 MICROgram(s)/Hr 1 Patch Transdermal every 72 hours  fluconAZOLE   Tablet 100 milliGRAM(s) Oral daily  glucagon  Injectable 1 milliGRAM(s) IntraMuscular once  insulin glargine Injectable (LANTUS) 36 Unit(s) SubCutaneous at bedtime  insulin lispro (ADMELOG) corrective regimen sliding scale   SubCutaneous every 6 hours  iron sucrose Injectable 100 milliGRAM(s) IV Push every 24 hours  lactated ringers. 1000 milliLiter(s) (75 mL/Hr) IV Continuous <Continuous>  lactobacillus acidophilus 2 Tablet(s) Oral daily  linezolid    Tablet 600 milliGRAM(s) Oral every 12 hours  LORazepam   Injectable 0.5 milliGRAM(s) IV Push three times a day  pantoprazole  Injectable 40 milliGRAM(s) IV Push two times a day  piperacillin/tazobactam IVPB.. 3.375 Gram(s) IV Intermittent every 12 hours  polyethylene glycol 3350 17 Gram(s) Oral daily  potassium phosphate / sodium phosphate Powder (PHOS-NaK) 1 Packet(s) Oral two times a day  senna 2 Tablet(s) Oral at bedtime  simethicone 80 milliGRAM(s) Chew every 6 hours  sucralfate suspension 1 Gram(s) Oral four times a day    MEDICATIONS  (PRN):  acetaminophen   Tablet .. 650 milliGRAM(s) Oral every 6 hours PRN Temp greater or equal to 38C (100.4F), Mild Pain (1 - 3)  LORazepam   Injectable 1 milliGRAM(s) IV Push every 2 hours PRN Agitation  sodium chloride 0.9% lock flush 10 milliLiter(s) IV Push every 1 hour PRN Pre/post blood products, medications, blood draw, and to maintain line patency    Pertinent Labs: 09-11 Na141 mmol/L Glu 132 mg/dL<H> K+ 4.4 mmol/L Cr  1.12 mg/dL BUN 18 mg/dL 09-11 Phos 2.0 mg/dL<L> 09-11 Alb 2.2 g/dL<L>     CAPILLARY BLOOD GLUCOSE      POCT Blood Glucose.: 160 mg/dL (11 Sep 2021 05:16)  POCT Blood Glucose.: 176 mg/dL (10 Sep 2021 23:46)  POCT Blood Glucose.: 158 mg/dL (10 Sep 2021 21:45)  POCT Blood Glucose.: 127 mg/dL (10 Sep 2021 18:33)  POCT Blood Glucose.: 177 mg/dL (10 Sep 2021 12:33)    Skin: none    Estimated Needs:   [ X] no change since previous assessment  [ ] recalculated:     Previous Nutrition Diagnosis:   [ ] Inadequate Energy Intake [ ]Inadequate Oral Intake [ ] Excessive Energy Intake   [ ] Underweight [ ] Increased Nutrient Needs [ ] Overweight/Obesity   [ ] Altered GI Function [ ] Unintended Weight Loss [ ] Food & Nutrition Related Knowledge Deficit [ ] Malnutrition   [x] swallow difficulty       Nutrition Diagnosis is [ X] ongoing  [ ] resolved [ ] not applicable     New Nutrition Diagnosis: [ ] not applicable       Interventions:   Recommend  [ ] Change Diet To:  [ ] Nutrition Supplement  [ ] Nutrition Support  [X ] Other: no carb porosurce 30 ml BID via PEG ( ( 30 gm pro and provides 120 kcals )     Monitoring and Evaluation:   [ ] PO intake [ x ] Tolerance to diet prescription [ x ] weights [ x ] labs[ x ] follow up per protocol  [ ] other:

## 2021-09-12 LAB
ANION GAP SERPL CALC-SCNC: 10 MMOL/L — SIGNIFICANT CHANGE UP (ref 5–17)
BUN SERPL-MCNC: 15 MG/DL — SIGNIFICANT CHANGE UP (ref 7–23)
CALCIUM SERPL-MCNC: 7.9 MG/DL — LOW (ref 8.4–10.5)
CHLORIDE SERPL-SCNC: 108 MMOL/L — SIGNIFICANT CHANGE UP (ref 96–108)
CO2 SERPL-SCNC: 23 MMOL/L — SIGNIFICANT CHANGE UP (ref 22–31)
CREAT SERPL-MCNC: 0.96 MG/DL — SIGNIFICANT CHANGE UP (ref 0.5–1.3)
CULTURE RESULTS: SIGNIFICANT CHANGE UP
CULTURE RESULTS: SIGNIFICANT CHANGE UP
GLUCOSE BLDC GLUCOMTR-MCNC: 135 MG/DL — HIGH (ref 70–99)
GLUCOSE BLDC GLUCOMTR-MCNC: 143 MG/DL — HIGH (ref 70–99)
GLUCOSE BLDC GLUCOMTR-MCNC: 154 MG/DL — HIGH (ref 70–99)
GLUCOSE BLDC GLUCOMTR-MCNC: 175 MG/DL — HIGH (ref 70–99)
GLUCOSE SERPL-MCNC: 115 MG/DL — HIGH (ref 70–99)
HCT VFR BLD CALC: 23.9 % — LOW (ref 34.5–45)
HCT VFR BLD CALC: 24 % — LOW (ref 34.5–45)
HGB BLD-MCNC: 7.1 G/DL — LOW (ref 11.5–15.5)
HGB BLD-MCNC: 7.2 G/DL — LOW (ref 11.5–15.5)
MCHC RBC-ENTMCNC: 26.5 PG — LOW (ref 27–34)
MCHC RBC-ENTMCNC: 26.9 PG — LOW (ref 27–34)
MCHC RBC-ENTMCNC: 29.6 GM/DL — LOW (ref 32–36)
MCHC RBC-ENTMCNC: 30.1 GM/DL — LOW (ref 32–36)
MCV RBC AUTO: 89.2 FL — SIGNIFICANT CHANGE UP (ref 80–100)
MCV RBC AUTO: 89.6 FL — SIGNIFICANT CHANGE UP (ref 80–100)
NRBC # BLD: 0 /100 WBCS — SIGNIFICANT CHANGE UP (ref 0–0)
NRBC # BLD: 1 /100 WBCS — HIGH (ref 0–0)
PLATELET # BLD AUTO: 208 K/UL — SIGNIFICANT CHANGE UP (ref 150–400)
PLATELET # BLD AUTO: 212 K/UL — SIGNIFICANT CHANGE UP (ref 150–400)
POTASSIUM SERPL-MCNC: 4.1 MMOL/L — SIGNIFICANT CHANGE UP (ref 3.5–5.3)
POTASSIUM SERPL-SCNC: 4.1 MMOL/L — SIGNIFICANT CHANGE UP (ref 3.5–5.3)
RBC # BLD: 2.68 M/UL — LOW (ref 3.8–5.2)
RBC # BLD: 2.68 M/UL — LOW (ref 3.8–5.2)
RBC # FLD: 16.4 % — HIGH (ref 10.3–14.5)
RBC # FLD: 16.4 % — HIGH (ref 10.3–14.5)
SARS-COV-2 RNA SPEC QL NAA+PROBE: SIGNIFICANT CHANGE UP
SODIUM SERPL-SCNC: 141 MMOL/L — SIGNIFICANT CHANGE UP (ref 135–145)
SPECIMEN SOURCE: SIGNIFICANT CHANGE UP
SPECIMEN SOURCE: SIGNIFICANT CHANGE UP
WBC # BLD: 6.08 K/UL — SIGNIFICANT CHANGE UP (ref 3.8–10.5)
WBC # BLD: 6.84 K/UL — SIGNIFICANT CHANGE UP (ref 3.8–10.5)
WBC # FLD AUTO: 6.08 K/UL — SIGNIFICANT CHANGE UP (ref 3.8–10.5)
WBC # FLD AUTO: 6.84 K/UL — SIGNIFICANT CHANGE UP (ref 3.8–10.5)

## 2021-09-12 PROCEDURE — 99233 SBSQ HOSP IP/OBS HIGH 50: CPT

## 2021-09-12 RX ADMIN — FENTANYL CITRATE 1 PATCH: 50 INJECTION INTRAVENOUS at 19:36

## 2021-09-12 RX ADMIN — Medication 10 MILLIGRAM(S): at 21:58

## 2021-09-12 RX ADMIN — SIMETHICONE 80 MILLIGRAM(S): 80 TABLET, CHEWABLE ORAL at 05:29

## 2021-09-12 RX ADMIN — INSULIN GLARGINE 36 UNIT(S): 100 INJECTION, SOLUTION SUBCUTANEOUS at 22:29

## 2021-09-12 RX ADMIN — Medication 3 MILLILITER(S): at 01:09

## 2021-09-12 RX ADMIN — PIPERACILLIN AND TAZOBACTAM 25 GRAM(S): 4; .5 INJECTION, POWDER, LYOPHILIZED, FOR SOLUTION INTRAVENOUS at 05:27

## 2021-09-12 RX ADMIN — SIMETHICONE 80 MILLIGRAM(S): 80 TABLET, CHEWABLE ORAL at 11:54

## 2021-09-12 RX ADMIN — CHLORHEXIDINE GLUCONATE 15 MILLILITER(S): 213 SOLUTION TOPICAL at 05:29

## 2021-09-12 RX ADMIN — Medication 0.5 MILLIGRAM(S): at 14:49

## 2021-09-12 RX ADMIN — Medication 0.5 MILLIGRAM(S): at 06:27

## 2021-09-12 RX ADMIN — Medication 1 GRAM(S): at 05:29

## 2021-09-12 RX ADMIN — FENTANYL CITRATE 1 PATCH: 50 INJECTION INTRAVENOUS at 07:10

## 2021-09-12 RX ADMIN — Medication 2: at 11:55

## 2021-09-12 RX ADMIN — Medication 3 MILLILITER(S): at 19:54

## 2021-09-12 RX ADMIN — LINEZOLID 600 MILLIGRAM(S): 600 INJECTION, SOLUTION INTRAVENOUS at 18:35

## 2021-09-12 RX ADMIN — SENNA PLUS 2 TABLET(S): 8.6 TABLET ORAL at 21:58

## 2021-09-12 RX ADMIN — Medication 1 GRAM(S): at 11:53

## 2021-09-12 RX ADMIN — LINEZOLID 600 MILLIGRAM(S): 600 INJECTION, SOLUTION INTRAVENOUS at 05:29

## 2021-09-12 RX ADMIN — METHOCARBAMOL 250 MILLIGRAM(S): 500 TABLET, FILM COATED ORAL at 12:05

## 2021-09-12 RX ADMIN — Medication 2: at 22:29

## 2021-09-12 RX ADMIN — Medication 1 MILLIGRAM(S): at 10:53

## 2021-09-12 RX ADMIN — POLYETHYLENE GLYCOL 3350 17 GRAM(S): 17 POWDER, FOR SOLUTION ORAL at 11:54

## 2021-09-12 RX ADMIN — Medication 3 MILLILITER(S): at 23:56

## 2021-09-12 RX ADMIN — PANTOPRAZOLE SODIUM 40 MILLIGRAM(S): 20 TABLET, DELAYED RELEASE ORAL at 05:28

## 2021-09-12 RX ADMIN — PANTOPRAZOLE SODIUM 40 MILLIGRAM(S): 20 TABLET, DELAYED RELEASE ORAL at 18:35

## 2021-09-12 RX ADMIN — SIMETHICONE 80 MILLIGRAM(S): 80 TABLET, CHEWABLE ORAL at 18:35

## 2021-09-12 RX ADMIN — Medication 3 MILLILITER(S): at 07:17

## 2021-09-12 RX ADMIN — CHLORHEXIDINE GLUCONATE 1 APPLICATION(S): 213 SOLUTION TOPICAL at 12:06

## 2021-09-12 RX ADMIN — Medication 0.5 MILLIGRAM(S): at 21:53

## 2021-09-12 RX ADMIN — Medication 1 PACKET(S): at 18:35

## 2021-09-12 RX ADMIN — Medication 1 GRAM(S): at 18:35

## 2021-09-12 RX ADMIN — IRON SUCROSE 100 MILLIGRAM(S): 20 INJECTION, SOLUTION INTRAVENOUS at 12:04

## 2021-09-12 RX ADMIN — SIMETHICONE 80 MILLIGRAM(S): 80 TABLET, CHEWABLE ORAL at 23:05

## 2021-09-12 RX ADMIN — Medication 1 GRAM(S): at 23:05

## 2021-09-12 RX ADMIN — Medication 1 PACKET(S): at 05:29

## 2021-09-12 RX ADMIN — Medication 3 MILLILITER(S): at 13:18

## 2021-09-12 RX ADMIN — Medication 1 MILLIGRAM(S): at 19:42

## 2021-09-12 RX ADMIN — Medication 81 MILLIGRAM(S): at 12:06

## 2021-09-12 RX ADMIN — Medication 2 TABLET(S): at 11:54

## 2021-09-12 NOTE — PROGRESS NOTE ADULT - ASSESSMENT
76 yo f pmhx dementia, CVA, VDRF s/p trach/peg, quadriplegia, DM, HTN, GERD, constipation admitted with   s/p card arrest - sepsis - shock - acute infection - dementia - chr resp failure    Podiatry cx noted  Robaxin added -     neuro notes reviewed - vs noted -     ID f/u noted  ABX for 5 days total    GOC discussion  pt is full code   is the HCP  on TF  on NEBS  on ABX rx regimen  assist with needs - ADL  HOB elev - orgal hygiene - skin care  prognosis guarded to poor

## 2021-09-12 NOTE — PROGRESS NOTE ADULT - ASSESSMENT
The patient is a 75 year old female with a history of HTN, DM, CVA, dementia, chronic respiratory failure s/p trach, PEG who is admitted after cardiac arrest.     9/12/21  Seen at [facility] ICU  Patient sleeping comfortably  Responds slightly to deep tactile stimuli  Monitor-NSR  H&H-7.2/23.9    Plan:  - ECG with RBBB and nonspecific findings  - Troponin mildly elevated at 0.127 in the setting of demand ischemia from cardiac arrest, septic shock  - Echo with normal LV systolic function, mild pulm HTN-see above report  - Off of pressors  - Continue aspirin 81 mg daily  - Follow labs  - On zosyn, fluconazole  - Mechanical ventilation  - Being followed by Pulmonary, Neurology, Podiatry, ID, Palliative care  - Overall prognosis very poor

## 2021-09-12 NOTE — PROGRESS NOTE ADULT - SUBJECTIVE AND OBJECTIVE BOX
BREA BECKHAM    [facility]    Patient is a 75y old  Female who presents with a chief complaint of Unresponsive, post-cardiac arrest (12 Sep 2021 06:25)       Allergies    codeine (Hives)    Intolerances        HPI:  74 y/o f trach dependent, with PEG tube, DM, HTN, GERD, CVA, dementia, constipation p/w being found unresponsive at [facility], brought in by EMS for post cardiac arrest care.       In ED, pt was given IVF NS bolus, was foudn to be hypotensive, and started on levophed for pressor support, and CT showed signs of aspiration PNA, an dpt was started on IV zosyn.     (07 Sep 2021 12:19)      PAST MEDICAL & SURGICAL HISTORY:  Dementia of frontal lobe type    Aphasic stroke    Diabetes mellitus    Respiratory failure    Hypertension    GERD (gastroesophageal reflux disease)    Constipation    Respiratory failure    CVA (cerebral vascular accident)    HTN (hypertension)    DM (diabetes mellitus)    Advanced dementia    COVID-19 virus detected    Quadriplegia    Pneumonia    Type II diabetes mellitus    Hx of appendectomy    Gastrostomy in place    Tracheostomy in place    Tracheostomy tube present    Feeding by G-tube        FAMILY HISTORY:  No pertinent family history in first degree relatives          MEDICATIONS   acetaminophen   Tablet .. 650 milliGRAM(s) Oral every 6 hours PRN  albuterol/ipratropium for Nebulization 3 milliLiter(s) Nebulizer every 6 hours  aspirin  chewable 81 milliGRAM(s) Enteral Tube daily  bisacodyl Suppository 10 milliGRAM(s) Rectal at bedtime  chlorhexidine 0.12% Liquid 15 milliLiter(s) Oral Mucosa two times a day  chlorhexidine 2% Cloths 1 Application(s) Topical daily  dextrose 40% Gel 15 Gram(s) Oral once  dextrose 5%. 1000 milliLiter(s) IV Continuous <Continuous>  dextrose 5%. 1000 milliLiter(s) IV Continuous <Continuous>  dextrose 50% Injectable 25 Gram(s) IV Push once  dextrose 50% Injectable 12.5 Gram(s) IV Push once  dextrose 50% Injectable 25 Gram(s) IV Push once  fentaNYL   Patch  12 MICROgram(s)/Hr 1 Patch Transdermal every 72 hours  glucagon  Injectable 1 milliGRAM(s) IntraMuscular once  insulin glargine Injectable (LANTUS) 36 Unit(s) SubCutaneous at bedtime  insulin lispro (ADMELOG) corrective regimen sliding scale   SubCutaneous every 6 hours  iron sucrose Injectable 100 milliGRAM(s) IV Push every 24 hours  lactobacillus acidophilus 2 Tablet(s) Oral daily  linezolid    Tablet 600 milliGRAM(s) Oral every 12 hours  LORazepam   Injectable 1 milliGRAM(s) IV Push every 2 hours PRN  LORazepam   Injectable 0.5 milliGRAM(s) IV Push three times a day  methocarbamol 250 milliGRAM(s) Oral daily  pantoprazole  Injectable 40 milliGRAM(s) IV Push two times a day  piperacillin/tazobactam IVPB.. 3.375 Gram(s) IV Intermittent every 12 hours  polyethylene glycol 3350 17 Gram(s) Oral daily  potassium phosphate / sodium phosphate Powder (PHOS-NaK) 1 Packet(s) Oral two times a day  senna 2 Tablet(s) Oral at bedtime  simethicone 80 milliGRAM(s) Chew every 6 hours  sodium chloride 0.9% lock flush 10 milliLiter(s) IV Push every 1 hour PRN  sucralfate suspension 1 Gram(s) Oral four times a day      Vital Signs Last 24 Hrs  T(C): 37.1 (12 Sep 2021 05:33), Max: 37.1 (12 Sep 2021 05:33)  T(F): 98.8 (12 Sep 2021 05:33), Max: 98.8 (12 Sep 2021 05:33)  HR: 67 (12 Sep 2021 07:20) (62 - 101)  BP: 114/70 (12 Sep 2021 06:30) (84/45 - 183/65)  BP(mean): 83 (12 Sep 2021 06:30) (58 - 99)  RR: 28 (12 Sep 2021 06:30) (6 - 35)  SpO2: 99% (12 Sep 2021 07:20) (93% - 100%)      09-11-21 @ 07:01  -  09-12-21 @ 07:00  --------------------------------------------------------  IN: 2825 mL / OUT: 0 mL / NET: 2825 mL        Mode: AC/ CMV (Assist Control/ Continuous Mandatory Ventilation), RR (machine): 14, TV (machine): 400, FiO2: 30, PEEP: 5, ITime: 1, MAP: 11, PIP: 28    LABS:                        7.1    6.08  )-----------( 212      ( 12 Sep 2021 06:48 )             24.0     09-12    141  |  108  |  15  ----------------------------<  115<H>  4.1   |  23  |  0.96    Ca    7.9<L>      12 Sep 2021 06:48  Phos  2.0     09-11  Mg     1.7     09-11    TPro  6.7  /  Alb  2.2<L>  /  TBili  0.3  /  DBili  x   /  AST  38  /  ALT  45  /  AlkPhos  86  09-11    PT/INR - ( 11 Sep 2021 07:30 )   PT: 12.7 sec;   INR: 1.05 ratio                   WBC:  WBC Count: 6.08 K/uL (09-12 @ 06:48)  WBC Count: 7.52 K/uL (09-11 @ 07:30)  WBC Count: 5.50 K/uL (09-10 @ 08:17)  WBC Count: 7.69 K/uL (09-09 @ 15:06)  WBC Count: 4.84 K/uL (09-09 @ 07:04)  WBC Count: 7.65 K/uL (09-08 @ 09:03)      MICROBIOLOGY:  RECENT CULTURES:  09-08 .Sputum Sputum Serratia marcescens  Pseudomonas aeruginosa   Moderate polymorphonuclear leukocytes per low power field  Few Squamous epithelial cells per low power field  Moderate Gram Positive Rods per oil power field  Moderate Gram Negative Rods per oil power field  Few Yeast like cells per oil power field  Moderate Gram Negative Diplococci per oil power field   Numerous Serratia marcescens  Numerous Pseudomonas aeruginosa  Normal Respiratory Elvira present    09-07 .Blood Blood-Peripheral Proteus mirabilis XXXX   No growth to date.                PT/INR - ( 11 Sep 2021 07:30 )   PT: 12.7 sec;   INR: 1.05 ratio             Sodium:  Sodium, Serum: 141 mmol/L (09-12 @ 06:48)  Sodium, Serum: 141 mmol/L (09-11 @ 07:30)  Sodium, Serum: 140 mmol/L (09-10 @ 08:17)  Sodium, Serum: 142 mmol/L (09-09 @ 07:04)      0.96 mg/dL 09-12 @ 06:48  1.12 mg/dL 09-11 @ 07:30  1.08 mg/dL 09-10 @ 08:17  1.24 mg/dL 09-09 @ 07:04      Hemoglobin:  Hemoglobin: 7.1 g/dL (09-12 @ 06:48)  Hemoglobin: 8.3 g/dL (09-11 @ 07:30)  Hemoglobin: 7.0 g/dL (09-10 @ 08:17)  Hemoglobin: 7.3 g/dL (09-09 @ 15:06)  Hemoglobin: 7.0 g/dL (09-09 @ 07:04)  Hemoglobin: 7.7 g/dL (09-08 @ 09:03)      Platelets: Platelet Count - Automated: 212 K/uL (09-12 @ 06:48)  Platelet Count - Automated: 247 K/uL (09-11 @ 07:30)  Platelet Count - Automated: 206 K/uL (09-10 @ 08:17)  Platelet Count - Automated: 222 K/uL (09-09 @ 15:06)  Platelet Count - Automated: 226 K/uL (09-09 @ 07:04)  Platelet Count - Automated: 289 K/uL (09-08 @ 09:03)      LIVER FUNCTIONS - ( 11 Sep 2021 07:30 )  Alb: 2.2 g/dL / Pro: 6.7 g/dL / ALK PHOS: 86 U/L / ALT: 45 U/L DA / AST: 38 U/L / GGT: x                 RADIOLOGY & ADDITIONAL STUDIES:

## 2021-09-12 NOTE — PROGRESS NOTE ADULT - ASSESSMENT
75F Chronic respiratory failure trach/vent dependent, with PEG tube, functional quadriplegia/total care patient, DM2, HTN, GERD, CVA, dementia, constipation p/w being found unresponsive at [facility], brought in by EMS for post cardiac arrest care, found to have sepsis, b/l aspiration PNA, and mild colonic ileus

## 2021-09-12 NOTE — PROGRESS NOTE ADULT - SUBJECTIVE AND OBJECTIVE BOX
[facility] DIVISION of INFECTIOUS DISEASE  Arturo Olivas MD PhD, Haylee Umanzor MD, Andressa Brantley MD, Billy Valenzuela MD  and providing coverage with Alexa Canas MD and Eusebio Chairez MD  Providing Infectious Disease Consultations at [facility], [facility], [facility]'s    Office# [phone] to schedule follow up appointments  Answering Service for urgent calls or New Consults [phone]  Cell# to text for urgent issues Arturo Olivas [phone]     infectious diseases progress note:    BREA BECKHAM is a 75y y. o. Female patient    No concerning overnight events    Allergies    codeine (Hives)    Intolerances        ANTIBIOTICS/RELEVANT:  antimicrobials  linezolid    Tablet 600 milliGRAM(s) Oral every 12 hours  piperacillin/tazobactam IVPB.. 3.375 Gram(s) IV Intermittent every 12 hours    immunologic:    OTHER:  acetaminophen   Tablet .. 650 milliGRAM(s) Oral every 6 hours PRN  albuterol/ipratropium for Nebulization 3 milliLiter(s) Nebulizer every 6 hours  aspirin  chewable 81 milliGRAM(s) Enteral Tube daily  bisacodyl Suppository 10 milliGRAM(s) Rectal at bedtime  chlorhexidine 0.12% Liquid 15 milliLiter(s) Oral Mucosa two times a day  chlorhexidine 2% Cloths 1 Application(s) Topical daily  dextrose 40% Gel 15 Gram(s) Oral once  dextrose 5%. 1000 milliLiter(s) IV Continuous <Continuous>  dextrose 5%. 1000 milliLiter(s) IV Continuous <Continuous>  dextrose 50% Injectable 25 Gram(s) IV Push once  dextrose 50% Injectable 12.5 Gram(s) IV Push once  dextrose 50% Injectable 25 Gram(s) IV Push once  fentaNYL   Patch  12 MICROgram(s)/Hr 1 Patch Transdermal every 72 hours  glucagon  Injectable 1 milliGRAM(s) IntraMuscular once  insulin glargine Injectable (LANTUS) 36 Unit(s) SubCutaneous at bedtime  insulin lispro (ADMELOG) corrective regimen sliding scale   SubCutaneous every 6 hours  iron sucrose Injectable 100 milliGRAM(s) IV Push every 24 hours  lactobacillus acidophilus 2 Tablet(s) Oral daily  LORazepam   Injectable 1 milliGRAM(s) IV Push every 2 hours PRN  LORazepam   Injectable 0.5 milliGRAM(s) IV Push three times a day  methocarbamol 250 milliGRAM(s) Oral daily  pantoprazole  Injectable 40 milliGRAM(s) IV Push two times a day  polyethylene glycol 3350 17 Gram(s) Oral daily  potassium phosphate / sodium phosphate Powder (PHOS-NaK) 1 Packet(s) Oral two times a day  senna 2 Tablet(s) Oral at bedtime  simethicone 80 milliGRAM(s) Chew every 6 hours  sodium chloride 0.9% lock flush 10 milliLiter(s) IV Push every 1 hour PRN  sucralfate suspension 1 Gram(s) Oral four times a day      Objective:  Vital Signs Last 24 Hrs  T(C): 36.7 (12 Sep 2021 08:45), Max: 37.1 (12 Sep 2021 05:33)  T(F): 98 (12 Sep 2021 08:45), Max: 98.8 (12 Sep 2021 05:33)  HR: 75 (12 Sep 2021 08:00) (62 - 101)  BP: 116/60 (12 Sep 2021 08:00) (84/45 - 183/65)  BP(mean): 77 (12 Sep 2021 08:00) (58 - 99)  RR: 19 (12 Sep 2021 08:00) (6 - 35)  SpO2: 100% (12 Sep 2021 08:00) (93% - 100%)    T(C): 36.7 (09-12-21 @ 08:45), Max: 37.1 (09-10-21 @ 16:10)  T(C): 36.7 (09-12-21 @ 08:45), Max: 37.7 (09-09-21 @ 12:00)  T(C): 36.7 (09-12-21 @ 08:45), Max: 38.1 (09-09-21 @ 00:00)    PHYSICAL EXAM: a little agitated after getting cleaned  HEENT: NC atraumatic  Neck: supple, intubated via trach  Respiratory: no accessory muscle use, breathing comfortably  Cardiovascular: distant  Gastrointestinal: normal appearing, nondistended  Extremities: no clubbing, no cyanosis, left foot great toe red , dark area, some desquamation    Mode: AC/ CMV (Assist Control/ Continuous Mandatory Ventilation), RR (machine): 14, TV (machine): 400, FiO2: 30, PEEP: 5, ITime: 1, MAP: 11, PIP: 28    LABS:                          7.2    6.84  )-----------( 208      ( 12 Sep 2021 10:12 )             23.9       6.84 09-12 @ 10:12  6.08 09-12 @ 06:48  7.52 09-11 @ 07:30  5.50 09-10 @ 08:17  7.69 09-09 @ 15:06  4.84 09-09 @ 07:04  7.65 09-08 @ 09:03  8.44 09-08 @ 06:55  26.45 09-07 @ 10:38      09-12    141  |  108  |  15  ----------------------------<  115<H>  4.1   |  23  |  0.96    Ca    7.9<L>      12 Sep 2021 06:48  Phos  2.0     09-11  Mg     1.7     09-11    TPro  6.7  /  Alb  2.2<L>  /  TBili  0.3  /  DBili  x   /  AST  38  /  ALT  45  /  AlkPhos  86  09-11      Creatinine, Serum: 0.96 mg/dL (09-12-21 @ 06:48)  Creatinine, Serum: 1.12 mg/dL (09-11-21 @ 07:30)  Creatinine, Serum: 1.08 mg/dL (09-10-21 @ 08:17)  Creatinine, Serum: 1.24 mg/dL (09-09-21 @ 07:04)  Creatinine, Serum: 1.61 mg/dL (09-08-21 @ 06:55)  Creatinine, Serum: 2.58 mg/dL (09-07-21 @ 10:56)      PT/INR - ( 11 Sep 2021 07:30 )   PT: 12.7 sec;   INR: 1.05 ratio                   INFLAMMATORY MARKERS  Auto Neutrophil #: 22.22 K/uL (09-07-21 @ 10:38)  Auto Lymphocyte #: 2.65 K/uL (09-07-21 @ 10:38)    Lactate, Blood: 1.0 mmol/L (09-08-21 @ 06:55)  Lactate, Blood: 1.1 mmol/L (09-07-21 @ 20:59)  Lactate, Blood: 3.7 mmol/L (09-07-21 @ 14:27)  Lactate, Blood: 13.5 mmol/L (09-07-21 @ 10:38)    Auto Eosinophil #: 0.00 K/uL (09-07-21 @ 10:38)        Procalcitonin, Serum: 2.13 ng/mL (09-11-21 @ 11:44)  Procalcitonin, Serum: 7.69 ng/mL (09-08-21 @ 13:26)    Troponin I, Serum: .035 ng/mL (09-09-21 @ 07:04)  Troponin I, Serum: .091 ng/mL (09-08-21 @ 06:55)  Troponin I, Serum: .123 ng/mL (09-07-21 @ 20:59)  Troponin I, Serum: .127 ng/mL (09-07-21 @ 10:38)          Ferritin, Serum: 152 ng/mL (09-09-21 @ 18:50)        INR: 1.05 ratio (09-11-21 @ 07:30)  INR: 1.03 ratio (09-09-21 @ 07:04)  INR: 1.03 ratio (09-08-21 @ 06:55)  Activated Partial Thromboplastin Time: 41.4 sec (09-07-21 @ 10:38)  INR: 1.03 ratio (09-07-21 @ 10:38)          MICROBIOLOGY:              RADIOLOGY & ADDITIONAL STUDIES:

## 2021-09-12 NOTE — PROGRESS NOTE ADULT - COMMENTS
Unable to obtain history due to underlying medical condition.
Unable to obtain history due to underlying medical condition.

## 2021-09-12 NOTE — PROGRESS NOTE ADULT - SUBJECTIVE AND OBJECTIVE BOX
Date/Time Patient Seen:  		  Referring MD:   Data Reviewed	       Patient is a 75y old  Female who presents with a chief complaint of Unresponsive, post-cardiac arrest (11 Sep 2021 20:37)      Subjective/HPI     PAST MEDICAL & SURGICAL HISTORY:  Dementia of frontal lobe type    Aphasic stroke    Diabetes mellitus    Respiratory failure    Hypertension    GERD (gastroesophageal reflux disease)    Constipation    Respiratory failure    CVA (cerebral vascular accident)    HTN (hypertension)    DM (diabetes mellitus)    Advanced dementia    COVID-19 virus detected    Quadriplegia    Pneumonia    Type II diabetes mellitus    Hx of appendectomy    Gastrostomy in place    Tracheostomy in place    Tracheostomy tube present    Feeding by G-tube          Medication list         MEDICATIONS  (STANDING):  albuterol/ipratropium for Nebulization 3 milliLiter(s) Nebulizer every 6 hours  aspirin  chewable 81 milliGRAM(s) Enteral Tube daily  bisacodyl Suppository 10 milliGRAM(s) Rectal at bedtime  chlorhexidine 0.12% Liquid 15 milliLiter(s) Oral Mucosa two times a day  chlorhexidine 2% Cloths 1 Application(s) Topical daily  dextrose 40% Gel 15 Gram(s) Oral once  dextrose 5%. 1000 milliLiter(s) (100 mL/Hr) IV Continuous <Continuous>  dextrose 5%. 1000 milliLiter(s) (50 mL/Hr) IV Continuous <Continuous>  dextrose 50% Injectable 25 Gram(s) IV Push once  dextrose 50% Injectable 12.5 Gram(s) IV Push once  dextrose 50% Injectable 25 Gram(s) IV Push once  fentaNYL   Patch  12 MICROgram(s)/Hr 1 Patch Transdermal every 72 hours  glucagon  Injectable 1 milliGRAM(s) IntraMuscular once  insulin glargine Injectable (LANTUS) 36 Unit(s) SubCutaneous at bedtime  insulin lispro (ADMELOG) corrective regimen sliding scale   SubCutaneous every 6 hours  iron sucrose Injectable 100 milliGRAM(s) IV Push every 24 hours  lactobacillus acidophilus 2 Tablet(s) Oral daily  linezolid    Tablet 600 milliGRAM(s) Oral every 12 hours  LORazepam   Injectable 0.5 milliGRAM(s) IV Push three times a day  methocarbamol 250 milliGRAM(s) Oral daily  pantoprazole  Injectable 40 milliGRAM(s) IV Push two times a day  piperacillin/tazobactam IVPB.. 3.375 Gram(s) IV Intermittent every 12 hours  polyethylene glycol 3350 17 Gram(s) Oral daily  potassium phosphate / sodium phosphate Powder (PHOS-NaK) 1 Packet(s) Oral two times a day  senna 2 Tablet(s) Oral at bedtime  simethicone 80 milliGRAM(s) Chew every 6 hours  sucralfate suspension 1 Gram(s) Oral four times a day    MEDICATIONS  (PRN):  acetaminophen   Tablet .. 650 milliGRAM(s) Oral every 6 hours PRN Temp greater or equal to 38C (100.4F), Mild Pain (1 - 3)  LORazepam   Injectable 1 milliGRAM(s) IV Push every 2 hours PRN Agitation  sodium chloride 0.9% lock flush 10 milliLiter(s) IV Push every 1 hour PRN Pre/post blood products, medications, blood draw, and to maintain line patency         Vitals log        ICU Vital Signs Last 24 Hrs  T(C): 36.8 (11 Sep 2021 22:00), Max: 36.8 (11 Sep 2021 12:47)  T(F): 98.2 (11 Sep 2021 22:00), Max: 98.2 (11 Sep 2021 12:47)  HR: 65 (12 Sep 2021 05:15) (62 - 101)  BP: 106/74 (12 Sep 2021 04:00) (84/45 - 183/65)  BP(mean): 85 (12 Sep 2021 04:00) (58 - 99)  ABP: --  ABP(mean): --  RR: 24 (12 Sep 2021 04:00) (6 - 35)  SpO2: 100% (12 Sep 2021 05:15) (93% - 100%)       Mode: AC/ CMV (Assist Control/ Continuous Mandatory Ventilation)  RR (machine): 14  TV (machine): 400  FiO2: 30  PEEP: 5  ITime: 1  MAP: 11  PIP: 28      Input and Output:  I&O's Detail    10 Sep 2021 07:01  -  11 Sep 2021 07:00  --------------------------------------------------------  IN:    Enteral Tube Flush: 325 mL    IV PiggyBack: 580 mL    Lactated Ringers: 975 mL    TwoCal HN: 520 mL  Total IN: 2400 mL    OUT:    Voided (mL): 1000 mL  Total OUT: 1000 mL    Total NET: 1400 mL      11 Sep 2021 07:01  -  12 Sep 2021 06:25  --------------------------------------------------------  IN:    Enteral Tube Flush: 725 mL    IV PiggyBack: 100 mL    Lactated Ringers: 900 mL    TwoCal HN: 920 mL  Total IN: 2645 mL    OUT:  Total OUT: 0 mL    Total NET: 2645 mL          Lab Data                        8.3    7.52  )-----------( 247      ( 11 Sep 2021 07:30 )             27.8     09-11    141  |  107  |  18  ----------------------------<  132<H>  4.4   |  20<L>  |  1.12    Ca    8.8      11 Sep 2021 07:30  Phos  2.0     09-11  Mg     1.7     09-11    TPro  6.7  /  Alb  2.2<L>  /  TBili  0.3  /  DBili  x   /  AST  38  /  ALT  45  /  AlkPhos  86  09-11            Review of Systems	      Objective     Physical Examination    heart s1s2  lung dec BS  abd soft      Pertinent Lab findings & Imaging      Annalise:  NO   Adequate UO     I&O's Detail    10 Sep 2021 07:01  -  11 Sep 2021 07:00  --------------------------------------------------------  IN:    Enteral Tube Flush: 325 mL    IV PiggyBack: 580 mL    Lactated Ringers: 975 mL    TwoCal HN: 520 mL  Total IN: 2400 mL    OUT:    Voided (mL): 1000 mL  Total OUT: 1000 mL    Total NET: 1400 mL      11 Sep 2021 07:01  -  12 Sep 2021 06:25  --------------------------------------------------------  IN:    Enteral Tube Flush: 725 mL    IV PiggyBack: 100 mL    Lactated Ringers: 900 mL    TwoCal HN: 920 mL  Total IN: 2645 mL    OUT:  Total OUT: 0 mL    Total NET: 2645 mL               Discussed with:     Cultures:	        Radiology

## 2021-09-12 NOTE — PROGRESS NOTE ADULT - ASSESSMENT
76 y/o f trach dependent, with PEG tube, DM, HTN, GERD, CVA, dementia, constipation p/w being found unresponsive at [facility], brought in by EMS for post cardiac arrest care.  Noted to be febrile with leukocytosis and bandemia.    RECOMMENDATIONS    Sepsis - pt has vaughn, chronic vent support with trach, unclear localization.     rec continue ZOSYN (started 9/7) with recs to follow but high risk for PNA, recommend 5 days so 9/11 as last day -will dc today    diabetic ulceration distal aspect hallux Left foot/ingrown left hallux nail   pt is MRSA PCR neg, has been on Zosyn so some concerns regarding this being pressure related. Acutely developed so suggest mostly likely superficial  podiatry involved  added linezolid 600mg PO BID x 5 days  so last day 9/14    anemia and electrolyte management per primary [facility] physician    We will follow along in the care of this patient. Please call us at [phone] or text me directly on my cell# at [phone] with any concerns.

## 2021-09-12 NOTE — PROGRESS NOTE ADULT - ASSESSMENT
CHAPINCITO GUIDRY 75 f [facility] P 9/7/2021   DR ILIANA KEMP     REVIEW OF SYMPTOMS      Able to give (reliable) ROS  NO     PHYSICAL EXAM    HEENT Unremarkable  atraumatic   RESP Fair air entry EXP prolonged    Harsh breath sound Resp distres mild   CARDIAC S1 S2 No S3     NO JVD    ABDOMEN SOFT BS PRESENT NOT DISTENDED No hepatosplenomegaly   PEDAL EDEMA present No calf tenderness  NO rash       BEST PRACTICE ISSUES.                                                  HEAD OF BED ELEVATION. Yes  DVT PROPHYLAXIS.  lvnx 30 (9/7/2021) -> hpsc (9/7) -> dced 9/8 drop in hb                                         SQUIRES PROPHYLAXIS.   protonix 40 (9/7/2021)       carafate 1.4 (9/7/2021)                                                                                   DIET.     npo 9/7/2021  -> glucerna 1.5 1200 (9/7)   -> 2cal 800 (9/9)                   INFECTION PROPHYLAXIS.           chlorhexidine 2% (9/8)            chlorhexidine .12 9/9/2021     GENERAL ISSUES  GOC ADVANCED DIRECTIVE.                                         ALLERGY.                            WEIGHT.         9/7/2021 130                           BMI.                   9/7/2021  22      PATIENT DATA   TUBES  PROCEDURES.      9/7/2021 Attempted by er md andrews and r subclav unsuccessful   9/7/2021 IO   9/7/2021 vaughn  9/8/2021 C line lij  ccpa   9/9/2021 vaughn dced     PEG poa  TRACH poa     ABG.     VITALS/IO/VENT/DRIPS.    9/12/2021 81 120/60   9/12/2021 1p ac 14/400/5/.3      PROBLEM ASSESSMENT/RECOMMENDATIONS.    COVID PROFILE.  scv2 9/7/2021 (-)  spk ab 9/8 (+) 250   No current covid infection    SP CAC AT [facility] FEW MIN 9/7/2021.   there was low grade troponin elevation on admission which down trended  and was likely demand ischemia   Opens eyes     POSSIBLE ASP PNEUMONIA poa.  9/7 ct basal as neum distended esophagus   w 9/11/2021 w 7.5   pr 9/11/2021 pr 2.3   mrsa 9/8/2021 (-)   blod c 9/7 (-)   legn 9/7 (-)   urine 9/7 proteus   sputum 9/8 serratia pseudomonas   linezolid 9/10/2021 x 5d (Dr Mcpherson) (paronychia)       TRACH POA.     VENT DEPENENT POA.   Target pH 730 (+) PO2 60 (+) po 90-95% Pplat 35 (-)   HOBE DSV DSBT  9/8/2021 No abg available resp could not get abg     COPD.  duoneb (9/7/2021)        RO MI.   Tr 9/7-9/8/2021 tr .17- .091   ASA 81 (9/7/2021)   metoprolol 25.2 (9/7/2021)     RO CHF.   bnp 9/7/2021 bnp 2391   echo 6/15/2021 n lvsf dd1     ANEMIA   Hb 9/7-9/8-9/9 -9/10-9/11-9/12 Hb 11.6-7.6- 7.3 - 7.7 - 8.3 - 7.3   gi on case   Drop likely sec to hydration Need to exclude abla       PEG POA.    GERD.  9/7/2021 ct cap gerd   protonix 40  9/7/2021)                                             ELEVATED LFTS POA.  LFTS 9/7/2021  ap 122  ast 205  alt 81                                        COLON ILEUS ON CTAP 9/7/2021.   bisacodyl 10 (9/7/2021)   miralax (9/7/2021)   tolerating tf    RYAN poa.  Cr 9/7-9/8-9/9-9/10-9/12/2021 Cr 2.5- 1.6- 1.2 - 1 - .9    DM.   insulin     PMH APHASIC STROKE.   AC R SPHENOID SINUSITIS 9/7/2021 CT.   ct head 9/7/2021 No ac poss ac r sphenoid sinusitis    PAIN.   fentnyl 12 (9/7/2021)    ANXIETY.   lorazepam 1.3 (9/7/2021)     SEIZURES 9/9/2021   Given lorazepam 4 on 9/9/2021   Neuro called 9/9/2021   methocarbamol 250 started by neuro 9/11/2021     TOES LESION NOTED 9/10/2021   9/11/2021 Podiatry saw         OVERALL PLAN.    74 f PMH OBS cva chr vdrf peg past ho vap past ho pseudomonas sp cc admitted 9/7   Rxed with abio  Shock and lacticemia poa resolvd   Had sz 9/9 Noted to have anemia   Has toe lesion seen by podiatry 9/10     SP CAC C monitor C enz Cardio on case    VENT Vent bndle  ASP PNEUM completed zosyn 5 d course   DROP IN HB 9/8/2021 Likely sec fluids gi on case no ongoing gi bl suspectd no egd plannd    SZ on anticonvulsants (9/9/2021)   TOES lesion noted 9/10/2021 seen by podiatry 9/11/2021   DVT ppplx To restart if hvb stable and when gi okasy     TIME SPENT   Over 36 minutes aggregate critical care time spent on encounter; activities included   direct patient care, counseling and/or coordinating care reviewing notes, lab data/ imaging , discussion with multidisciplinary team/ patient  /family and explaining in detail risks, benefits, alternatives  of the recommendations     CHAPINCITO GUIDRY 75 f [facility] P 9/7/2021   DR ILIANA KEMP

## 2021-09-12 NOTE — PROGRESS NOTE ADULT - SUBJECTIVE AND OBJECTIVE BOX
Patient is a 75y Female with a known history of :  Cardiac arrest [I46.9]    Sepsis [A41.9]    Ileus [K56.7]    Diabetes [E11.9]    HTN (hypertension) [I10]    GERD (gastroesophageal reflux disease) [K21.9]    Need for prophylactic measure [Z29.9]    Anemia due to acute blood loss [D62]    Involuntary jerky movements [R25.8]    Hypophosphatemia [E83.39]    Diabetic foot ulcer [E11.621]      HPI:  74 y/o f trach dependent, with PEG tube, DM, HTN, GERD, CVA, dementia, constipation p/w being found unresponsive at [facility], brought in by EMS for post cardiac arrest care.       In ED, pt was given IVF NS bolus, was foudn to be hypotensive, and started on levophed for pressor support, and CT showed signs of aspiration PNA, an dpt was started on IV zosyn.     (07 Sep 2021 12:19)      REVIEW OF SYSTEMS:    CONSTITUTIONAL: No fever, weight loss, or fatigue  EYES: No eye pain, visual disturbances, or discharge  ENMT:  No difficulty hearing, tinnitus, vertigo; No sinus or throat pain  NECK: No pain or stiffness  BREASTS: No pain, masses, or nipple discharge  RESPIRATORY: No cough, wheezing, chills or hemoptysis; No shortness of breath  CARDIOVASCULAR: No chest pain, palpitations, dizziness, or leg swelling  GASTROINTESTINAL: No abdominal or epigastric pain. No nausea, vomiting, or hematemesis; No diarrhea or constipation. No melena or hematochezia.  GENITOURINARY: No dysuria, frequency, hematuria, or incontinence  NEUROLOGICAL: No headaches, memory loss, loss of strength, numbness, or tremors  SKIN: No itching, burning, rashes, or lesions   LYMPH NODES: No enlarged glands  ENDOCRINE: No heat or cold intolerance; No hair loss  MUSCULOSKELETAL: No joint pain or swelling; No muscle, back, or extremity pain  PSYCHIATRIC: No depression, anxiety, mood swings, or difficulty sleeping  HEME/LYMPH: No easy bruising, or bleeding gums  ALLERGY AND IMMUNOLOGIC: No hives or eczema    MEDICATIONS  (STANDING):  albuterol/ipratropium for Nebulization 3 milliLiter(s) Nebulizer every 6 hours  aspirin  chewable 81 milliGRAM(s) Enteral Tube daily  bisacodyl Suppository 10 milliGRAM(s) Rectal at bedtime  chlorhexidine 0.12% Liquid 15 milliLiter(s) Oral Mucosa two times a day  chlorhexidine 2% Cloths 1 Application(s) Topical daily  dextrose 40% Gel 15 Gram(s) Oral once  dextrose 5%. 1000 milliLiter(s) (50 mL/Hr) IV Continuous <Continuous>  dextrose 5%. 1000 milliLiter(s) (100 mL/Hr) IV Continuous <Continuous>  dextrose 50% Injectable 25 Gram(s) IV Push once  dextrose 50% Injectable 12.5 Gram(s) IV Push once  dextrose 50% Injectable 25 Gram(s) IV Push once  fentaNYL   Patch  12 MICROgram(s)/Hr 1 Patch Transdermal every 72 hours  glucagon  Injectable 1 milliGRAM(s) IntraMuscular once  insulin glargine Injectable (LANTUS) 36 Unit(s) SubCutaneous at bedtime  insulin lispro (ADMELOG) corrective regimen sliding scale   SubCutaneous every 6 hours  iron sucrose Injectable 100 milliGRAM(s) IV Push every 24 hours  lactobacillus acidophilus 2 Tablet(s) Oral daily  linezolid    Tablet 600 milliGRAM(s) Oral every 12 hours  LORazepam   Injectable 0.5 milliGRAM(s) IV Push three times a day  methocarbamol 250 milliGRAM(s) Oral daily  pantoprazole  Injectable 40 milliGRAM(s) IV Push two times a day  polyethylene glycol 3350 17 Gram(s) Oral daily  potassium phosphate / sodium phosphate Powder (PHOS-NaK) 1 Packet(s) Oral two times a day  senna 2 Tablet(s) Oral at bedtime  simethicone 80 milliGRAM(s) Chew every 6 hours  sucralfate suspension 1 Gram(s) Oral four times a day    MEDICATIONS  (PRN):  acetaminophen   Tablet .. 650 milliGRAM(s) Oral every 6 hours PRN Temp greater or equal to 38C (100.4F), Mild Pain (1 - 3)  LORazepam   Injectable 1 milliGRAM(s) IV Push every 2 hours PRN Agitation  sodium chloride 0.9% lock flush 10 milliLiter(s) IV Push every 1 hour PRN Pre/post blood products, medications, blood draw, and to maintain line patency      ALLERGIES: codeine (Hives)      FAMILY HISTORY:  No pertinent family history in first degree relatives        Social history:  Alochol:   Smoking:   Drug Use:   Marital Status:     PHYSICAL EXAMINATION:  -----------------------------  T(C): 36.7 (09-12-21 @ 08:45), Max: 37.1 (09-12-21 @ 05:33)  HR: 70 (09-12-21 @ 10:00) (62 - 101)  BP: 107/56 (09-12-21 @ 10:00) (84/45 - 183/65)  RR: 11 (09-12-21 @ 10:00) (6 - 35)  SpO2: 100% (09-12-21 @ 10:00) (93% - 100%)  Wt(kg): --    09-11 @ 07:01  -  09-12 @ 07:00  --------------------------------------------------------  IN:    Enteral Tube Flush: 765 mL    IV PiggyBack: 200 mL    Lactated Ringers: 900 mL    TwoCal HN: 960 mL  Total IN: 2825 mL    OUT:  Total OUT: 0 mL    Total NET: 2825 mL            Constitutional: well developed, normal appearance, well groomed, well nourished, no deformities and no acute distress.   Eyes: the conjunctiva exhibited no abnormalities and the eyelids demonstrated no xanthelasmas.   HEENT: normal oral mucosa, no oral pallor and no oral cyanosis.   Neck: normal jugular venous A waves present, normal jugular venous V waves present and no jugular venous obregon A waves.   Pulmonary: no respiratory distress, normal respiratory rhythm and effort, no accessory muscle use and lungs were clear to auscultation bilaterally. Anteriorly  Cardiovascular: heart rate and rhythm were normal, normal S1 and S2 and no murmur, gallop, rub, heave or thrill are present.    Musculoskeletal: the gait could not be assessed.   Extremities: no clubbing of the fingernails, no localized cyanosis, no petechial hemorrhages and no ischemic changes.   Skin: normal skin color and pigmentation, no rash, no venous stasis, no skin lesions, no skin ulcer and no xanthoma was observed.      LABS:   --------  09-12    141  |  108  |  15  ----------------------------<  115<H>  4.1   |  23  |  0.96    Ca    7.9<L>      12 Sep 2021 06:48  Phos  2.0     09-11  Mg     1.7     09-11    TPro  6.7  /  Alb  2.2<L>  /  TBili  0.3  /  DBili  x   /  AST  38  /  ALT  45  /  AlkPhos  86  09-11                         7.2    6.84  )-----------( 208      ( 12 Sep 2021 10:12 )             23.9     PT/INR - ( 11 Sep 2021 07:30 )   PT: 12.7 sec;   INR: 1.05 ratio                       Radiology:

## 2021-09-12 NOTE — PROGRESS NOTE ADULT - SUBJECTIVE AND OBJECTIVE BOX
Neurology follow up note    BREA [id number]      Interval History:    Patient resting in bed     Allergies    codeine (Hives)    Intolerances        MEDICATIONS    acetaminophen   Tablet .. 650 milliGRAM(s) Oral every 6 hours PRN  albuterol/ipratropium for Nebulization 3 milliLiter(s) Nebulizer every 6 hours  aspirin  chewable 81 milliGRAM(s) Enteral Tube daily  bisacodyl Suppository 10 milliGRAM(s) Rectal at bedtime  chlorhexidine 0.12% Liquid 15 milliLiter(s) Oral Mucosa two times a day  chlorhexidine 2% Cloths 1 Application(s) Topical daily  dextrose 40% Gel 15 Gram(s) Oral once  dextrose 5%. 1000 milliLiter(s) IV Continuous <Continuous>  dextrose 5%. 1000 milliLiter(s) IV Continuous <Continuous>  dextrose 50% Injectable 25 Gram(s) IV Push once  dextrose 50% Injectable 12.5 Gram(s) IV Push once  dextrose 50% Injectable 25 Gram(s) IV Push once  fentaNYL   Patch  12 MICROgram(s)/Hr 1 Patch Transdermal every 72 hours  glucagon  Injectable 1 milliGRAM(s) IntraMuscular once  insulin glargine Injectable (LANTUS) 36 Unit(s) SubCutaneous at bedtime  insulin lispro (ADMELOG) corrective regimen sliding scale   SubCutaneous every 6 hours  iron sucrose Injectable 100 milliGRAM(s) IV Push every 24 hours  lactobacillus acidophilus 2 Tablet(s) Oral daily  linezolid    Tablet 600 milliGRAM(s) Oral every 12 hours  LORazepam   Injectable 1 milliGRAM(s) IV Push every 2 hours PRN  LORazepam   Injectable 0.5 milliGRAM(s) IV Push three times a day  methocarbamol 250 milliGRAM(s) Oral daily  pantoprazole  Injectable 40 milliGRAM(s) IV Push two times a day  polyethylene glycol 3350 17 Gram(s) Oral daily  potassium phosphate / sodium phosphate Powder (PHOS-NaK) 1 Packet(s) Oral two times a day  senna 2 Tablet(s) Oral at bedtime  simethicone 80 milliGRAM(s) Chew every 6 hours  sodium chloride 0.9% lock flush 10 milliLiter(s) IV Push every 1 hour PRN  sucralfate suspension 1 Gram(s) Oral four times a day              Vital Signs Last 24 Hrs  T(C): 36.7 (12 Sep 2021 08:45), Max: 37.1 (12 Sep 2021 05:33)  T(F): 98 (12 Sep 2021 08:45), Max: 98.8 (12 Sep 2021 05:33)  HR: 78 (12 Sep 2021 11:19) (62 - 101)  BP: 107/56 (12 Sep 2021 10:00) (84/45 - 183/65)  BP(mean): 71 (12 Sep 2021 10:00) (58 - 99)  RR: 11 (12 Sep 2021 10:00) (6 - 35)  SpO2: 99% (12 Sep 2021 11:19) (93% - 100%)    REVIEW OF SYSTEMS:  Could not be obtained secondary to the patient being nonverbal.  As per my conversation with the spouse, a gradual process along with underlying strokes causing her to become bed-bound.    PHYSICAL EXAMINATION:    Head:  Normocephalic, atraumatic.  Eyes:  No scleral icterus.  Ears:  Cannot be evaluated secondary to the patient being nonverbal.  NECK:  Tracheostomy was in place.  RESPIRATORY:  Good air entry bilaterally.  CARDIOVASCULAR:  S1 and S2 heard.  ABDOMEN:  Soft and nontender.  EXTREMITIES:  No clubbing or cyanosis were noted.      NEUROLOGIC:  The patient was awake in bed.  At present no movement.  does have a history upon stimulating the patient, her eyes did roll up.  Her body started to stiffen with abnormal mouth movements, lasted one to two minutes history of this  Pupils bilaterally were 3 mm, reactive to 2 mm.  The patient has her arms in a flexed position with both hands contracted.  Bilateral lower extremities were in a straight position.  The patient has increased tone, bilateral upper and lower extremities.                   LABS:  CBC Full  -  ( 12 Sep 2021 10:12 )  WBC Count : 6.84 K/uL  RBC Count : 2.68 M/uL  Hemoglobin : 7.2 g/dL  Hematocrit : 23.9 %  Platelet Count - Automated : 208 K/uL  Mean Cell Volume : 89.2 fl  Mean Cell Hemoglobin : 26.9 pg  Mean Cell Hemoglobin Concentration : 30.1 gm/dL  Auto Neutrophil # : x  Auto Lymphocyte # : x  Auto Monocyte # : x  Auto Eosinophil # : x  Auto Basophil # : x  Auto Neutrophil % : x  Auto Lymphocyte % : x  Auto Monocyte % : x  Auto Eosinophil % : x  Auto Basophil % : x      09-12    141  |  108  |  15  ----------------------------<  115<H>  4.1   |  23  |  0.96    Ca    7.9<L>      12 Sep 2021 06:48  Phos  2.0     09-11  Mg     1.7     09-11    TPro  6.7  /  Alb  2.2<L>  /  TBili  0.3  /  DBili  x   /  AST  38  /  ALT  45  /  AlkPhos  86  09-11    Hemoglobin A1C:     LIVER FUNCTIONS - ( 11 Sep 2021 07:30 )  Alb: 2.2 g/dL / Pro: 6.7 g/dL / ALK PHOS: 86 U/L / ALT: 45 U/L DA / AST: 38 U/L / GGT: x           Vitamin B12   PT/INR - ( 11 Sep 2021 07:30 )   PT: 12.7 sec;   INR: 1.05 ratio               RADIOLOGY    ANALYSIS AND PLAN:  This is a 75-year-old with an episode of cardiac arrest and abnormal movements.  For episodes of cardiac arrest, continue to monitor heart rate on telemetry.  If possible, please maintain a systolic blood pressure greater than 100, to help maintain cerebral perfusion.  For history of abnormal movements, as per my conversation with the spouse, this is a known condition that happens to her, as per him with GI-related issues.  She will start to hyperventilate, eyes will roll up, will have abnormal mouth movement, and tried to move from side-to-side.  She has undergone extensive EEG monitoring and has captured these events on numerous occasions and deemed to be non-epileptiform.  For now, I would recommend supportive therapy.  No antiepileptic medications.  For history of diabetes, strict control of blood sugars.  For history of cerebrovascular accident, continue the patient on her home medications.  spoke to spouse agreeable to try muscle relaxant     The spouse's name is Marciano.  His telephone number is [phone] 9/12.  I had a lengthy discussion, we will not be starting any antiepileptic medications.    Greater than 40 minutes of time was spent with the patient, plan of care, reviewing data, speaking to the multidisciplinary healthcare team with greater than 50% of that time in counseling and care coordination.

## 2021-09-12 NOTE — PROGRESS NOTE ADULT - EXTREMITIES COMMENTS
2+ Peripheral Pulses, No edema, left first toe swollen, dark areas, elongated nail.
left first toe swollen, dark areas, elongated nail.

## 2021-09-12 NOTE — PROGRESS NOTE ADULT - SUBJECTIVE AND OBJECTIVE BOX
Patient is a 75y old  Female who presents with a chief complaint of Unresponsive, post-cardiac arrest (12 Sep 2021 07:59)      INTERVAL HPI/OVERNIGHT EVENTS:  Pt is seen and examined.    Pain Location & Control:     MEDICATIONS  (STANDING):  albuterol/ipratropium for Nebulization 3 milliLiter(s) Nebulizer every 6 hours  aspirin  chewable 81 milliGRAM(s) Enteral Tube daily  bisacodyl Suppository 10 milliGRAM(s) Rectal at bedtime  chlorhexidine 0.12% Liquid 15 milliLiter(s) Oral Mucosa two times a day  chlorhexidine 2% Cloths 1 Application(s) Topical daily  dextrose 40% Gel 15 Gram(s) Oral once  dextrose 5%. 1000 milliLiter(s) (50 mL/Hr) IV Continuous <Continuous>  dextrose 5%. 1000 milliLiter(s) (100 mL/Hr) IV Continuous <Continuous>  dextrose 50% Injectable 25 Gram(s) IV Push once  dextrose 50% Injectable 12.5 Gram(s) IV Push once  dextrose 50% Injectable 25 Gram(s) IV Push once  fentaNYL   Patch  12 MICROgram(s)/Hr 1 Patch Transdermal every 72 hours  glucagon  Injectable 1 milliGRAM(s) IntraMuscular once  insulin glargine Injectable (LANTUS) 36 Unit(s) SubCutaneous at bedtime  insulin lispro (ADMELOG) corrective regimen sliding scale   SubCutaneous every 6 hours  iron sucrose Injectable 100 milliGRAM(s) IV Push every 24 hours  lactobacillus acidophilus 2 Tablet(s) Oral daily  linezolid    Tablet 600 milliGRAM(s) Oral every 12 hours  LORazepam   Injectable 0.5 milliGRAM(s) IV Push three times a day  methocarbamol 250 milliGRAM(s) Oral daily  pantoprazole  Injectable 40 milliGRAM(s) IV Push two times a day  piperacillin/tazobactam IVPB.. 3.375 Gram(s) IV Intermittent every 12 hours  polyethylene glycol 3350 17 Gram(s) Oral daily  potassium phosphate / sodium phosphate Powder (PHOS-NaK) 1 Packet(s) Oral two times a day  senna 2 Tablet(s) Oral at bedtime  simethicone 80 milliGRAM(s) Chew every 6 hours  sucralfate suspension 1 Gram(s) Oral four times a day    MEDICATIONS  (PRN):  acetaminophen   Tablet .. 650 milliGRAM(s) Oral every 6 hours PRN Temp greater or equal to 38C (100.4F), Mild Pain (1 - 3)  LORazepam   Injectable 1 milliGRAM(s) IV Push every 2 hours PRN Agitation  sodium chloride 0.9% lock flush 10 milliLiter(s) IV Push every 1 hour PRN Pre/post blood products, medications, blood draw, and to maintain line patency      Allergies    codeine (Hives)    Intolerances            Vital Signs Last 24 Hrs  T(C): 36.7 (12 Sep 2021 08:45), Max: 37.1 (12 Sep 2021 05:33)  T(F): 98 (12 Sep 2021 08:45), Max: 98.8 (12 Sep 2021 05:33)  HR: 75 (12 Sep 2021 08:00) (62 - 101)  BP: 116/60 (12 Sep 2021 08:00) (84/45 - 183/65)  BP(mean): 77 (12 Sep 2021 08:00) (58 - 99)  RR: 19 (12 Sep 2021 08:00) (6 - 35)  SpO2: 100% (12 Sep 2021 08:00) (93% - 100%)        LABS:                        7.1    6.08  )-----------( 212      ( 12 Sep 2021 06:48 )             24.0     12 Sep 2021 06:48    141    |  108    |  15     ----------------------------<  115    4.1     |  23     |  0.96     Ca    7.9        12 Sep 2021 06:48      PT/INR - ( 11 Sep 2021 07:30 )   PT: 12.7 sec;   INR: 1.05 ratio             CAPILLARY BLOOD GLUCOSE      POCT Blood Glucose.: 143 mg/dL (12 Sep 2021 06:29)  POCT Blood Glucose.: 115 mg/dL (11 Sep 2021 23:13)  POCT Blood Glucose.: 161 mg/dL (11 Sep 2021 17:46)  POCT Blood Glucose.: 169 mg/dL (11 Sep 2021 11:56)        Cultures  Culture Results:   Numerous Serratia marcescens  Numerous Pseudomonas aeruginosa  Normal Respiratory Elvira present (09-08 @ 13:17)  Culture Results:   No growth to date. (09-07 @ 15:14)  Culture Results:   No growth to date. (09-07 @ 15:14)  Culture Results:   >100,000 CFU/ml Proteus mirabilis (09-07 @ 15:14)        Culture - Sputum (collected 09-08-21 @ 13:17)  Source: .Sputum Sputum  Gram Stain (09-08-21 @ 15:32):    Moderate polymorphonuclear leukocytes per low power field    Few Squamous epithelial cells per low power field    Moderate Gram Positive Rods per oil power field    Moderate Gram Negative Rods per oil power field    Few Yeast like cells per oil power field    Moderate Gram Negative Diplococci per oil power field  Final Report (09-10-21 @ 13:43):    Numerous Serratia marcescens    Numerous Pseudomonas aeruginosa    Normal Respiratory Elvira present  Organism: Serratia marcescens  Pseudomonas aeruginosa (09-10-21 @ 13:43)  Organism: Pseudomonas aeruginosa (09-10-21 @ 13:43)      -  Amikacin: S <=16      -  Aztreonam: S <=4      -  Cefepime: S <=2      -  Ceftazidime: S <=1      -  Ciprofloxacin: I 1      -  Gentamicin: R >8      -  Imipenem: S <=1      -  Levofloxacin: S <=0.5      -  Meropenem: S <=1      -  Piperacillin/Tazobactam: S <=8      -  Tobramycin: I 8      Method Type: PRATIK  Organism: Serratia marcescens (09-10-21 @ 13:43)      -  Amikacin: S <=16      -  Amoxicillin/Clavulanic Acid: R >16/8      -  Ampicillin: R >16 These ampicillin results predict results for amoxicillin      -  Ampicillin/Sulbactam: R >16/8 Enterobacter, Citrobacter, and Serratia may develop resistance during prolonged therapy (3-4 days)      -  Aztreonam: S <=4      -  Cefazolin: R >16 Enterobacter, Citrobacter, and Serratia may develop resistance during prolonged therapy (3-4 days)      -  Cefepime: S <=2      -  Cefoxitin: R <=8      -  Ceftriaxone: I 2 Enterobacter, Citrobacter, and Serratia may develop resistance during prolonged therapy      -  Ciprofloxacin: R >2      -  Ertapenem: S <=0.5      -  Gentamicin: S <=2      -  Levofloxacin: R 2      -  Meropenem: S <=1      -  Piperacillin/Tazobactam: S <=8      -  Tobramycin: S <=2      -  Trimethoprim/Sulfamethoxazole: S <=0.5/9.5      Method Type: PRATIK    Culture - Urine (collected 09-07-21 @ 15:14)  Source: Clean Catch Clean Catch (Midstream)  Final Report (09-09-21 @ 08:00):    >100,000 CFU/ml Proteus mirabilis  Organism: Proteus mirabilis (09-09-21 @ 08:00)  Organism: Proteus mirabilis (09-09-21 @ 08:00)      -  Amikacin: S <=16      -  Amoxicillin/Clavulanic Acid: S <=8/4      -  Ampicillin: S <=8 These ampicillin results predict results for amoxicillin      -  Ampicillin/Sulbactam: S <=4/2 Enterobacter, Citrobacter, and Serratia may develop resistance during prolonged therapy (3-4 days)      -  Aztreonam: S <=4      -  Cefazolin: S 4 ([id number]) For uncomplicated UTI with K. pneumoniae, E. coli, or P. mirablis: PRATIK <=16 is sensitive and PRATIK >=32 is resistant. This also predicts results for oral agents cefaclor, cefdinir, cefpodoxime, cefprozil, cefuroxime axetil, cephalexin and locarbef for uncomplicated UTI. Note that some isolates may be susceptible to these agents while testing resistant to cefazolin.      -  Cefepime: S <=2      -  Cefoxitin: S <=8      -  Ceftriaxone: S <=1 Enterobacter, Citrobacter, and Serratia may develop resistance during prolonged therapy      -  Ciprofloxacin: R >2      -  Ertapenem: S <=0.5      -  Gentamicin: S 4      -  Levofloxacin: R 2      -  Meropenem: S <=1      -  Nitrofurantoin: R >64 Should not be used to treat pyelonephritis      -  Piperacillin/Tazobactam: S <=8      -  Tobramycin: S 4      -  Trimethoprim/Sulfamethoxazole: R >2/38      Method Type: PRATIK    Culture - Blood (collected 09-07-21 @ 15:14)  Source: .Blood Blood-Peripheral  Preliminary Report (09-08-21 @ 16:01):    No growth to date.    Culture - Blood (collected 09-07-21 @ 15:14)  Source: .Blood Blood-Peripheral  Preliminary Report (09-08-21 @ 16:01):    No growth to date.        RADIOLOGY & ADDITIONAL TESTS:    Imaging Personally Reviewed:  [ ] YES  [ ] NO    Consultant(s) Notes Reviewed:  [ ] YES  [ ] NO    Care Discussed with Consultants/Other Providers [x ] YES  [ ] NO

## 2021-09-12 NOTE — PROGRESS NOTE ADULT - SUBJECTIVE AND OBJECTIVE BOX
PROGRESS NOTE   Patient is a 75y old  Female who presents with a chief complaint of Unresponsive, post-cardiac arrest (12 Sep 2021 11:27)      HPI:  74 y/o f trach dependent, with PEG tube, DM, HTN, GERD, CVA, dementia, constipation p/w being found unresponsive at [facility], brought in by EMS for post cardiac arrest care.       In ED, pt was given IVF NS bolus, was foudn to be hypotensive, and started on levophed for pressor support, and CT showed signs of aspiration PNA, an dpt was started on IV zosyn.     (07 Sep 2021 12:19)      Vital Signs Last 24 Hrs  T(C): 37.2 (12 Sep 2021 12:30), Max: 37.2 (12 Sep 2021 12:30)  T(F): 99 (12 Sep 2021 12:30), Max: 99 (12 Sep 2021 12:30)  HR: 72 (12 Sep 2021 13:19) (62 - 101)  BP: 114/54 (12 Sep 2021 12:00) (84/45 - 183/65)  BP(mean): 71 (12 Sep 2021 12:00) (58 - 99)  RR: 18 (12 Sep 2021 12:00) (11 - 35)  SpO2: 99% (12 Sep 2021 13:19) (93% - 100%)                          7.2    6.84  )-----------( 208      ( 12 Sep 2021 10:12 )             23.9               09-12    141  |  108  |  15  ----------------------------<  115<H>  4.1   |  23  |  0.96    Ca    7.9<L>      12 Sep 2021 06:48  Phos  2.0     09-11  Mg     1.7     09-11    TPro  6.7  /  Alb  2.2<L>  /  TBili  0.3  /  DBili  x   /  AST  38  /  ALT  45  /  AlkPhos  86  09-11      PHYSICAL EXAM  GEN: BREA BECKHAM is a pleasant well-nourished, well developed 75y Female in no acute distress, alert awake, and oriented to person, place and time.   LE Focused:  noted is decreased erytmea, edema and calor. the left hallux is responding favorable to the current care plan.    PROGRESS NOTE   Patient is a 75y old  Female who presents with a chief complaint of Unresponsive, post-cardiac arrest (12 Sep 2021 11:27)      HPI:  74 y/o f trach dependent, with PEG tube, DM, HTN, GERD, CVA, dementia, constipation p/w being found unresponsive at [facility], brought in by EMS for post cardiac arrest care.       In ED, pt was given IVF NS bolus, was foudn to be hypotensive, and started on levophed for pressor support, and CT showed signs of aspiration PNA, an dpt was started on IV zosyn.     (07 Sep 2021 12:19)      Vital Signs Last 24 Hrs  T(C): 37.2 (12 Sep 2021 12:30), Max: 37.2 (12 Sep 2021 12:30)  T(F): 99 (12 Sep 2021 12:30), Max: 99 (12 Sep 2021 12:30)  HR: 72 (12 Sep 2021 13:19) (62 - 101)  BP: 114/54 (12 Sep 2021 12:00) (84/45 - 183/65)  BP(mean): 71 (12 Sep 2021 12:00) (58 - 99)  RR: 18 (12 Sep 2021 12:00) (11 - 35)  SpO2: 99% (12 Sep 2021 13:19) (93% - 100%)                          7.2    6.84  )-----------( 208      ( 12 Sep 2021 10:12 )             23.9               09-12    141  |  108  |  15  ----------------------------<  115<H>  4.1   |  23  |  0.96    Ca    7.9<L>      12 Sep 2021 06:48  Phos  2.0     09-11  Mg     1.7     09-11    TPro  6.7  /  Alb  2.2<L>  /  TBili  0.3  /  DBili  x   /  AST  38  /  ALT  45  /  AlkPhos  86  09-11      PHYSICAL EXAM  GEN: BREA BECKHAM is a 75y Female in no acute distress on a ventilator and non responsive  LE Focused:  noted is decreased erytmea, edema and calor. the left hallux is responding favorable to the current care plan.

## 2021-09-13 ENCOUNTER — TRANSCRIPTION ENCOUNTER (OUTPATIENT)
Age: 76
End: 2021-09-13

## 2021-09-13 LAB
ALBUMIN SERPL ELPH-MCNC: 2 G/DL — LOW (ref 3.3–5)
ALP SERPL-CCNC: 76 U/L — SIGNIFICANT CHANGE UP (ref 30–120)
ALT FLD-CCNC: 29 U/L DA — SIGNIFICANT CHANGE UP (ref 10–60)
ANION GAP SERPL CALC-SCNC: 9 MMOL/L — SIGNIFICANT CHANGE UP (ref 5–17)
AST SERPL-CCNC: 21 U/L — SIGNIFICANT CHANGE UP (ref 10–40)
BILIRUB SERPL-MCNC: 0.2 MG/DL — SIGNIFICANT CHANGE UP (ref 0.2–1.2)
BUN SERPL-MCNC: 14 MG/DL — SIGNIFICANT CHANGE UP (ref 7–23)
CALCIUM SERPL-MCNC: 8.1 MG/DL — LOW (ref 8.4–10.5)
CHLORIDE SERPL-SCNC: 107 MMOL/L — SIGNIFICANT CHANGE UP (ref 96–108)
CO2 SERPL-SCNC: 24 MMOL/L — SIGNIFICANT CHANGE UP (ref 22–31)
CREAT SERPL-MCNC: 0.99 MG/DL — SIGNIFICANT CHANGE UP (ref 0.5–1.3)
GLUCOSE BLDC GLUCOMTR-MCNC: 118 MG/DL — HIGH (ref 70–99)
GLUCOSE BLDC GLUCOMTR-MCNC: 139 MG/DL — HIGH (ref 70–99)
GLUCOSE BLDC GLUCOMTR-MCNC: 150 MG/DL — HIGH (ref 70–99)
GLUCOSE BLDC GLUCOMTR-MCNC: 228 MG/DL — HIGH (ref 70–99)
GLUCOSE SERPL-MCNC: 117 MG/DL — HIGH (ref 70–99)
HCT VFR BLD CALC: 24.7 % — LOW (ref 34.5–45)
HGB BLD-MCNC: 7.3 G/DL — LOW (ref 11.5–15.5)
INR BLD: 1.09 RATIO — SIGNIFICANT CHANGE UP (ref 0.88–1.16)
MCHC RBC-ENTMCNC: 26.6 PG — LOW (ref 27–34)
MCHC RBC-ENTMCNC: 29.6 GM/DL — LOW (ref 32–36)
MCV RBC AUTO: 90.1 FL — SIGNIFICANT CHANGE UP (ref 80–100)
NRBC # BLD: 0 /100 WBCS — SIGNIFICANT CHANGE UP (ref 0–0)
PHOSPHATE SERPL-MCNC: 2.6 MG/DL — SIGNIFICANT CHANGE UP (ref 2.5–4.5)
PLATELET # BLD AUTO: 227 K/UL — SIGNIFICANT CHANGE UP (ref 150–400)
POTASSIUM SERPL-MCNC: 3.7 MMOL/L — SIGNIFICANT CHANGE UP (ref 3.5–5.3)
POTASSIUM SERPL-SCNC: 3.7 MMOL/L — SIGNIFICANT CHANGE UP (ref 3.5–5.3)
PROCALCITONIN SERPL-MCNC: 0.9 NG/ML — HIGH (ref 0.02–0.1)
PROT SERPL-MCNC: 5.8 G/DL — LOW (ref 6–8.3)
PROTHROM AB SERPL-ACNC: 13.1 SEC — SIGNIFICANT CHANGE UP (ref 10.6–13.6)
RBC # BLD: 2.74 M/UL — LOW (ref 3.8–5.2)
RBC # FLD: 16.6 % — HIGH (ref 10.3–14.5)
SODIUM SERPL-SCNC: 140 MMOL/L — SIGNIFICANT CHANGE UP (ref 135–145)
WBC # BLD: 6.38 K/UL — SIGNIFICANT CHANGE UP (ref 3.8–10.5)
WBC # FLD AUTO: 6.38 K/UL — SIGNIFICANT CHANGE UP (ref 3.8–10.5)

## 2021-09-13 PROCEDURE — 99233 SBSQ HOSP IP/OBS HIGH 50: CPT

## 2021-09-13 PROCEDURE — 71045 X-RAY EXAM CHEST 1 VIEW: CPT | Mod: 26

## 2021-09-13 PROCEDURE — 73551 X-RAY EXAM OF FEMUR 1: CPT | Mod: 26,LT

## 2021-09-13 RX ORDER — PANTOPRAZOLE SODIUM 20 MG/1
40 TABLET, DELAYED RELEASE ORAL
Refills: 0 | Status: DISCONTINUED | OUTPATIENT
Start: 2021-09-13 | End: 2021-09-15

## 2021-09-13 RX ORDER — ENOXAPARIN SODIUM 100 MG/ML
30 INJECTION SUBCUTANEOUS DAILY
Refills: 0 | Status: DISCONTINUED | OUTPATIENT
Start: 2021-09-13 | End: 2021-09-14

## 2021-09-13 RX ADMIN — Medication 1 MILLIGRAM(S): at 06:54

## 2021-09-13 RX ADMIN — Medication 1 PACKET(S): at 05:09

## 2021-09-13 RX ADMIN — Medication 4: at 11:37

## 2021-09-13 RX ADMIN — SIMETHICONE 80 MILLIGRAM(S): 80 TABLET, CHEWABLE ORAL at 05:09

## 2021-09-13 RX ADMIN — LINEZOLID 600 MILLIGRAM(S): 600 INJECTION, SOLUTION INTRAVENOUS at 05:09

## 2021-09-13 RX ADMIN — SIMETHICONE 80 MILLIGRAM(S): 80 TABLET, CHEWABLE ORAL at 23:12

## 2021-09-13 RX ADMIN — Medication 1 GRAM(S): at 23:12

## 2021-09-13 RX ADMIN — Medication 0.5 MILLIGRAM(S): at 05:13

## 2021-09-13 RX ADMIN — Medication 2 TABLET(S): at 11:38

## 2021-09-13 RX ADMIN — CHLORHEXIDINE GLUCONATE 15 MILLILITER(S): 213 SOLUTION TOPICAL at 05:09

## 2021-09-13 RX ADMIN — Medication 3 MILLILITER(S): at 14:32

## 2021-09-13 RX ADMIN — LINEZOLID 600 MILLIGRAM(S): 600 INJECTION, SOLUTION INTRAVENOUS at 17:26

## 2021-09-13 RX ADMIN — Medication 1 GRAM(S): at 17:27

## 2021-09-13 RX ADMIN — Medication 10 MILLIGRAM(S): at 23:12

## 2021-09-13 RX ADMIN — CHLORHEXIDINE GLUCONATE 15 MILLILITER(S): 213 SOLUTION TOPICAL at 17:26

## 2021-09-13 RX ADMIN — CHLORHEXIDINE GLUCONATE 1 APPLICATION(S): 213 SOLUTION TOPICAL at 11:37

## 2021-09-13 RX ADMIN — SIMETHICONE 80 MILLIGRAM(S): 80 TABLET, CHEWABLE ORAL at 11:39

## 2021-09-13 RX ADMIN — Medication 81 MILLIGRAM(S): at 11:37

## 2021-09-13 RX ADMIN — INSULIN GLARGINE 36 UNIT(S): 100 INJECTION, SOLUTION SUBCUTANEOUS at 23:12

## 2021-09-13 RX ADMIN — Medication 1 MILLIGRAM(S): at 23:12

## 2021-09-13 RX ADMIN — PANTOPRAZOLE SODIUM 40 MILLIGRAM(S): 20 TABLET, DELAYED RELEASE ORAL at 05:08

## 2021-09-13 RX ADMIN — PANTOPRAZOLE SODIUM 40 MILLIGRAM(S): 20 TABLET, DELAYED RELEASE ORAL at 17:26

## 2021-09-13 RX ADMIN — Medication 1 MILLIGRAM(S): at 17:26

## 2021-09-13 RX ADMIN — Medication 1 GRAM(S): at 11:39

## 2021-09-13 RX ADMIN — FENTANYL CITRATE 1 PATCH: 50 INJECTION INTRAVENOUS at 07:49

## 2021-09-13 RX ADMIN — Medication 1 GRAM(S): at 05:08

## 2021-09-13 RX ADMIN — POLYETHYLENE GLYCOL 3350 17 GRAM(S): 17 POWDER, FOR SOLUTION ORAL at 11:38

## 2021-09-13 RX ADMIN — Medication 3 MILLILITER(S): at 20:06

## 2021-09-13 RX ADMIN — FENTANYL CITRATE 1 PATCH: 50 INJECTION INTRAVENOUS at 19:15

## 2021-09-13 RX ADMIN — Medication 1 MILLIGRAM(S): at 08:59

## 2021-09-13 RX ADMIN — METHOCARBAMOL 250 MILLIGRAM(S): 500 TABLET, FILM COATED ORAL at 11:38

## 2021-09-13 RX ADMIN — Medication 2 MILLIGRAM(S): at 09:53

## 2021-09-13 RX ADMIN — SIMETHICONE 80 MILLIGRAM(S): 80 TABLET, CHEWABLE ORAL at 17:27

## 2021-09-13 RX ADMIN — Medication 3 MILLILITER(S): at 06:42

## 2021-09-13 RX ADMIN — FENTANYL CITRATE 1 PATCH: 50 INJECTION INTRAVENOUS at 20:11

## 2021-09-13 RX ADMIN — SENNA PLUS 2 TABLET(S): 8.6 TABLET ORAL at 23:12

## 2021-09-13 NOTE — PROGRESS NOTE ADULT - ASSESSMENT
76 yo f pmhx dementia, CVA, VDRF s/p trach/peg, quadriplegia, DM, HTN, GERD, constipation admitted with   s/p card arrest - sepsis - shock - acute infection - dementia - chr resp failure    covid pcr repeat NEG on 9 12 21    Podiatry cx noted - f/u reviewed -   Robaxin added -     neuro notes reviewed - vs noted -     ID f/u noted  ABX rx regimen in progress -     GOC discussion  pt is full code   is the HCP  on TF  on NEBS  on ABX rx regimen  assist with needs - ADL  HOB elev - orgal hygiene - skin care  prognosis guarded to poor

## 2021-09-13 NOTE — PROGRESS NOTE ADULT - ASSESSMENT
- cardiac arrest/pneumonia    anemia, guaiac +     Lft elevatoin     proctitis w/ colonic ileus (mild)      reflux    - monitor stools re signs for bleeding, no egd planned     IV Protonix BId. No plan for scope.  Can use Anticoagulation if indicated- monitor stools        monitor Lft-likely mild elevation component of ischemic hep     Lft normalized, do not need to trend daily.

## 2021-09-13 NOTE — PROGRESS NOTE ADULT - ASSESSMENT
CHAPINCITO GUIDRY 75 f [facility] P 9/7/2021   DR ILIANA KEMP     REVIEW OF SYMPTOMS      Able to give (reliable) ROS  NO     PHYSICAL EXAM    HEENT Unremarkable  atraumatic   RESP Fair air entry EXP prolonged    Harsh breath sound Resp distres mild   CARDIAC S1 S2 No S3     NO JVD    ABDOMEN SOFT BS PRESENT NOT DISTENDED No hepatosplenomegaly   PEDAL EDEMA present No calf tenderness  NO rash       BEST PRACTICE ISSUES.                                                  HEAD OF BED ELEVATION. Yes  DVT PROPHYLAXIS.  lvnx 30 (9/7/2021) -> hpsc (9/7) -> dced 9/8 drop in hb        -> lvnx 30                                 SQUIRES PROPHYLAXIS.   protonix 40 (9/7/2021)       carafate 1.4 (9/7/2021)                                                                                   DIET.     npo 9/7/2021  -> glucerna 1.5 1200 (9/7)   -> 2cal 800 (9/9)                   INFECTION PROPHYLAXIS.           chlorhexidine 2% (9/8)            chlorhexidine .12 9/9/2021     GENERAL ISSUES  GOC ADVANCED DIRECTIVE.                                         ALLERGY.                            WEIGHT.         9/7/2021 130                           BMI.                   9/7/2021  22    PATIENT DATA   TUBES  PROCEDURES.      9/7/2021 Attempted by er md andrews and r subclav unsuccessful   9/7/2021 IO   9/7/2021 vaughn  9/8/2021 C line lij  ccpa   9/9/2021 vaughn dced     PEG poa  TRACH poa     ABG.     VITALS/IO/VENT/DRIPS.    9/13/2021 106 140/70   9/13/2021 ac 14/400/5/.3       PROBLEM ASSESSMENT/RECOMMENDATIONS.    COVID PROFILE.  scv2 9/7/2021 (-)  spk ab 9/8 (+) 250   No current covid infection    SP CAC AT [facility] FEW MIN 9/7/2021.   there was low grade troponin elevation on admission which down trended  and was likely demand ischemia   Opens eyes     POSSIBLE ASP PNEUMONIA poa.  9/7 ct basal as neum distended esophagus   w 9/11-9/13/2021 w 7.5 - 6.3  pr 9/11/2021 pr 2.3   mrsa 9/8/2021 (-)   blod c 9/7 (-)   legn 9/7 (-)   urine 9/7 proteus   sputum 9/8 serratia pseudomonas   linezolid 9/10/2021 x 5d (Dr Mcpherson) (paronychia)     TRACH POA.     VENT DEPENENT POA.   Target pH 730 (+) PO2 60 (+) po 90-95% Pplat 35 (-)   HOBE DSV DSBT  9/8/2021 No abg available resp could not get abg     COPD.  duoneb (9/7/2021)        RO MI.   Tr 9/7-9/8/2021 tr .17- .091   ASA 81 (9/7/2021)   metoprolol 25.2 (9/7/2021)     RO CHF.   bnp 9/7/2021 bnp 2391   echo 6/15/2021 n lvsf dd1     ANEMIA   Hb 9/7-9/8-9/9 -9/10-9/11-9/12-9/13 Hb 11.6-7.6- 7.3 - 7.7 - 8.3 - 7.3 -7.3   gi on case   Drop likely sec to hydration Need to exclude abla       PEG POA.    GERD.  9/7/2021 ct cap gerd   protonix 40  9/7/2021)                                             ELEVATED LFTS POA.  LFTS 9/7/2021  ap 122  ast 205  alt 81                                        COLON ILEUS ON CTAP 9/7/2021.   bisacodyl 10 (9/7/2021)   miralax (9/7/2021)   tolerating tf    RYAN poa.  Cr 9/7-9/8-9/9-9/10-9/12/2021 Cr 2.5- 1.6- 1.2 - 1 - .9    DM.   insulin     PMH APHASIC STROKE.   AC R SPHENOID SINUSITIS 9/7/2021 CT.   ct head 9/7/2021 No ac poss ac r sphenoid sinusitis    PAIN.   fentnyl 12 (9/7/2021)    ANXIETY.   lorazepam 1.3 (9/7/2021)     SEIZURES 9/9/2021   Given lorazepam 4 on 9/9/2021   Neuro called 9/9/2021   methocarbamol 250 started by neuro 9/11/2021     TOES LESION NOTED 9/10/2021   9/11/2021 Podiatry saw         OVERALL PLAN.    74 f PMH OBS cva chr vdrf peg past ho vap past ho pseudomonas sp cc admitted 9/7 sp cac asp pneu   Rxed with abio  Shock and lacticemia poa resolvd   Had sz 9/9 Noted to have anemia   Has toe lesion seen by podiatry 9/10     SP CAC C monitor C enz Cardio on case    VENT Vent bndle  ASP PNEUM completed zosyn 5 d course   DROP IN HB 9/8/2021 Likely sec fluids gi on case no ongoing gi bl suspectd no egd plannd    SZ on anticonvulsants (9/9/2021)   TOES lesion noted 9/10/2021 seen by podiatry 9/11/2021   DVT ppplx    TIME SPENT   Over 36 minutes aggregate critical care time spent on encounter; activities included   direct patient care, counseling and/or coordinating care reviewing notes, lab data/ imaging , discussion with multidisciplinary team/ patient  /family and explaining in detail risks, benefits, alternatives  of the recommendations     CHAPINCITO GUIDRY 75 f [facility] P 9/7/2021   DR ILIANA KEMP

## 2021-09-13 NOTE — PROGRESS NOTE ADULT - SUBJECTIVE AND OBJECTIVE BOX
Patient is a 75y old  Female who presents with a chief complaint of Unresponsive, post-cardiac arrest (13 Sep 2021 10:39)    INTERVAL HPI/OVERNIGHT EVENTS: still having episodes, but having mult BM per day.    MEDICATIONS  (STANDING):  albuterol/ipratropium for Nebulization 3 milliLiter(s) Nebulizer every 6 hours  aspirin  chewable 81 milliGRAM(s) Enteral Tube daily  bisacodyl Suppository 10 milliGRAM(s) Rectal at bedtime  chlorhexidine 0.12% Liquid 15 milliLiter(s) Oral Mucosa two times a day  chlorhexidine 2% Cloths 1 Application(s) Topical daily  dextrose 40% Gel 15 Gram(s) Oral once  dextrose 5%. 1000 milliLiter(s) (50 mL/Hr) IV Continuous <Continuous>  dextrose 5%. 1000 milliLiter(s) (100 mL/Hr) IV Continuous <Continuous>  dextrose 50% Injectable 25 Gram(s) IV Push once  dextrose 50% Injectable 12.5 Gram(s) IV Push once  dextrose 50% Injectable 25 Gram(s) IV Push once  fentaNYL   Patch  12 MICROgram(s)/Hr 1 Patch Transdermal every 72 hours  glucagon  Injectable 1 milliGRAM(s) IntraMuscular once  insulin glargine Injectable (LANTUS) 36 Unit(s) SubCutaneous at bedtime  insulin lispro (ADMELOG) corrective regimen sliding scale   SubCutaneous every 6 hours  lactobacillus acidophilus 2 Tablet(s) Oral daily  linezolid    Tablet 600 milliGRAM(s) Oral every 12 hours  LORazepam     Tablet 1 milliGRAM(s) Oral every 6 hours  methocarbamol 250 milliGRAM(s) Oral daily  pantoprazole   Suspension 40 milliGRAM(s) Enteral Tube two times a day  polyethylene glycol 3350 17 Gram(s) Oral daily  senna 2 Tablet(s) Oral at bedtime  simethicone 80 milliGRAM(s) Chew every 6 hours  sucralfate suspension 1 Gram(s) Oral four times a day    MEDICATIONS  (PRN):  acetaminophen   Tablet .. 650 milliGRAM(s) Oral every 6 hours PRN Temp greater or equal to 38C (100.4F), Mild Pain (1 - 3)  LORazepam     Tablet 0.5 milliGRAM(s) Oral every 2 hours PRN Agitation  sodium chloride 0.9% lock flush 10 milliLiter(s) IV Push every 1 hour PRN Pre/post blood products, medications, blood draw, and to maintain line patency      Allergies  codeine (Hives)      REVIEW OF SYSTEMS:  unable to obtain    Vital Signs Last 24 Hrs  T(C): 36.7 (13 Sep 2021 08:15), Max: 37.2 (12 Sep 2021 12:30)  T(F): 98.1 (13 Sep 2021 08:15), Max: 99 (12 Sep 2021 12:30)  HR: 98 (13 Sep 2021 11:34) (64 - 131)  BP: 103/47 (13 Sep 2021 11:34) (83/41 - 178/136)  BP(mean): 64 (13 Sep 2021 11:34) (54 - 147)  RR: 18 (13 Sep 2021 11:34) (14 - 51)  SpO2: 100% (13 Sep 2021 11:34) (96% - 100%)    PHYSICAL EXAM:  GENERAL: NAD  HEAD:  Atraumatic, Normocephalic  EYES:  conjunctiva and sclera clear  ENMT: Moist mucous membranes  NECK: trach -> vent  NERVOUS SYSTEM:  opens eyes, having contortion episodes at times  CHEST/LUNG: Clear to auscultation bilaterally;   HEART: Regular rate and rhythm;   ABDOMEN: Soft, Nontender, Nondistended; Bowel sounds present  EXTREMITIES:  2+ Peripheral Pulses, No clubbing, cyanosis, or edema      LABS:                        7.3    6.38  )-----------( 227      ( 13 Sep 2021 06:30 )             24.7     13 Sep 2021 06:30    140    |  107    |  14     ----------------------------<  117    3.7     |  24     |  0.99     Ca    8.1        13 Sep 2021 06:30  Phos  2.6       13 Sep 2021 06:30    TPro  5.8    /  Alb  2.0    /  TBili  0.2    /  DBili  x      /  AST  21     /  ALT  29     /  AlkPhos  76     13 Sep 2021 06:30    PT/INR - ( 13 Sep 2021 06:30 )   PT: 13.1 sec;   INR: 1.09 ratio             CAPILLARY BLOOD GLUCOSE  POCT Blood Glucose.: 228 mg/dL (13 Sep 2021 11:35)  POCT Blood Glucose.: 118 mg/dL (13 Sep 2021 05:15)  POCT Blood Glucose.: 175 mg/dL (12 Sep 2021 22:26)  POCT Blood Glucose.: 135 mg/dL (12 Sep 2021 18:32)      RADIOLOGY & ADDITIONAL TESTS:    Imaging Personally Reviewed:  [ ] YES     Consultant(s) Notes Reviewed:      Care Discussed with Consultants/Other Providers:    Advanced Directives: [ ] DNR  [ ] No feeding tube  [ ] MOLST in chart  [ ] MOLST completed today  [ ] Unknown

## 2021-09-13 NOTE — PROGRESS NOTE ADULT - ASSESSMENT
Problem List:  1) chronic respiratory failure  2) PNA  3) diabetic foot ulcer/pressure ulcer     Continue current medications as ordered  Periodic tachypnea most likely when needs to defecate, self resolves, chronic issue  Completed course of zosyn for pna and uti  Remains on Po linezolid for toe ulcer   BP stable off pressors, sometimes hypertensive, Bp labile so will hold off on starting more medications  Glucose controlled on current regimen on lantus and sliding scale

## 2021-09-13 NOTE — PROGRESS NOTE ADULT - SUBJECTIVE AND OBJECTIVE BOX
[facility]    Patient is a 75y old  Female who presents with a chief complaint of Unresponsive, post-cardiac arrest (13 Sep 2021 10:14)       Allergies    codeine (Hives)    Intolerances        HPI:  76 y/o f trach dependent, with PEG tube, DM, HTN, GERD, CVA, dementia, constipation p/w being found unresponsive at [facility], brought in by EMS for post cardiac arrest care.       In ED, pt was given IVF NS bolus, was foudn to be hypotensive, and started on levophed for pressor support, and CT showed signs of aspiration PNA, an dpt was started on IV zosyn.     (07 Sep 2021 12:19)      PAST MEDICAL & SURGICAL HISTORY:  Dementia of frontal lobe type    Aphasic stroke    Diabetes mellitus    Respiratory failure    Hypertension    GERD (gastroesophageal reflux disease)    Constipation    Respiratory failure    CVA (cerebral vascular accident)    HTN (hypertension)    DM (diabetes mellitus)    Advanced dementia    COVID-19 virus detected    Quadriplegia    Pneumonia    Type II diabetes mellitus    Hx of appendectomy    Gastrostomy in place    Tracheostomy in place    Tracheostomy tube present    Feeding by G-tube        FAMILY HISTORY:  No pertinent family history in first degree relatives          MEDICATIONS   acetaminophen   Tablet .. 650 milliGRAM(s) Oral every 6 hours PRN  albuterol/ipratropium for Nebulization 3 milliLiter(s) Nebulizer every 6 hours  aspirin  chewable 81 milliGRAM(s) Enteral Tube daily  bisacodyl Suppository 10 milliGRAM(s) Rectal at bedtime  chlorhexidine 0.12% Liquid 15 milliLiter(s) Oral Mucosa two times a day  chlorhexidine 2% Cloths 1 Application(s) Topical daily  dextrose 40% Gel 15 Gram(s) Oral once  dextrose 5%. 1000 milliLiter(s) IV Continuous <Continuous>  dextrose 5%. 1000 milliLiter(s) IV Continuous <Continuous>  dextrose 50% Injectable 25 Gram(s) IV Push once  dextrose 50% Injectable 12.5 Gram(s) IV Push once  dextrose 50% Injectable 25 Gram(s) IV Push once  fentaNYL   Patch  12 MICROgram(s)/Hr 1 Patch Transdermal every 72 hours  glucagon  Injectable 1 milliGRAM(s) IntraMuscular once  insulin glargine Injectable (LANTUS) 36 Unit(s) SubCutaneous at bedtime  insulin lispro (ADMELOG) corrective regimen sliding scale   SubCutaneous every 6 hours  lactobacillus acidophilus 2 Tablet(s) Oral daily  linezolid    Tablet 600 milliGRAM(s) Oral every 12 hours  LORazepam   Injectable 1 milliGRAM(s) IV Push every 2 hours PRN  LORazepam   Injectable 0.5 milliGRAM(s) IV Push three times a day  methocarbamol 250 milliGRAM(s) Oral daily  pantoprazole  Injectable 40 milliGRAM(s) IV Push two times a day  polyethylene glycol 3350 17 Gram(s) Oral daily  senna 2 Tablet(s) Oral at bedtime  simethicone 80 milliGRAM(s) Chew every 6 hours  sodium chloride 0.9% lock flush 10 milliLiter(s) IV Push every 1 hour PRN  sucralfate suspension 1 Gram(s) Oral four times a day      Vital Signs Last 24 Hrs  T(C): 36.7 (13 Sep 2021 08:15), Max: 37.2 (12 Sep 2021 12:30)  T(F): 98.1 (13 Sep 2021 08:15), Max: 99 (12 Sep 2021 12:30)  HR: 131 (13 Sep 2021 10:00) (64 - 131)  BP: 178/136 (13 Sep 2021 10:00) (104/58 - 178/136)  BP(mean): 147 (13 Sep 2021 10:00) (69 - 147)  RR: 42 (13 Sep 2021 10:00) (14 - 51)  SpO2: 98% (13 Sep 2021 10:00) (97% - 100%)      09-12-21 @ 07:01  -  09-13-21 @ 07:00  --------------------------------------------------------  IN: 1920 mL / OUT: 800 mL / NET: 1120 mL        Mode: AC/ CMV (Assist Control/ Continuous Mandatory Ventilation), RR (machine): 14, TV (machine): 400, FiO2: 30, PEEP: 5, ITime: 1, MAP: 12, PIP: 28    LABS:                        7.3    6.38  )-----------( 227      ( 13 Sep 2021 06:30 )             24.7     09-13    140  |  107  |  14  ----------------------------<  117<H>  3.7   |  24  |  0.99    Ca    8.1<L>      13 Sep 2021 06:30  Phos  2.6     09-13    TPro  5.8<L>  /  Alb  2.0<L>  /  TBili  0.2  /  DBili  x   /  AST  21  /  ALT  29  /  AlkPhos  76  09-13    PT/INR - ( 13 Sep 2021 06:30 )   PT: 13.1 sec;   INR: 1.09 ratio                   WBC:  WBC Count: 6.38 K/uL (09-13 @ 06:30)  WBC Count: 6.84 K/uL (09-12 @ 10:12)  WBC Count: 6.08 K/uL (09-12 @ 06:48)  WBC Count: 7.52 K/uL (09-11 @ 07:30)  WBC Count: 5.50 K/uL (09-10 @ 08:17)  WBC Count: 7.69 K/uL (09-09 @ 15:06)      MICROBIOLOGY:  RECENT CULTURES:  09-08 .Sputum Sputum Serratia marcescens  Pseudomonas aeruginosa   Moderate polymorphonuclear leukocytes per low power field  Few Squamous epithelial cells per low power field  Moderate Gram Positive Rods per oil power field  Moderate Gram Negative Rods per oil power field  Few Yeast like cells per oil power field  Moderate Gram Negative Diplococci per oil power field   Numerous Serratia marcescens  Numerous Pseudomonas aeruginosa  Normal Respiratory Elvira present    09-07 .Blood Blood-Peripheral Proteus mirabilis XXXX   No Growth Final                PT/INR - ( 13 Sep 2021 06:30 )   PT: 13.1 sec;   INR: 1.09 ratio             Sodium:  Sodium, Serum: 140 mmol/L (09-13 @ 06:30)  Sodium, Serum: 141 mmol/L (09-12 @ 06:48)  Sodium, Serum: 141 mmol/L (09-11 @ 07:30)  Sodium, Serum: 140 mmol/L (09-10 @ 08:17)      0.99 mg/dL 09-13 @ 06:30  0.96 mg/dL 09-12 @ 06:48  1.12 mg/dL 09-11 @ 07:30  1.08 mg/dL 09-10 @ 08:17      Hemoglobin:  Hemoglobin: 7.3 g/dL (09-13 @ 06:30)  Hemoglobin: 7.2 g/dL (09-12 @ 10:12)  Hemoglobin: 7.1 g/dL (09-12 @ 06:48)  Hemoglobin: 8.3 g/dL (09-11 @ 07:30)  Hemoglobin: 7.0 g/dL (09-10 @ 08:17)  Hemoglobin: 7.3 g/dL (09-09 @ 15:06)      Platelets: Platelet Count - Automated: 227 K/uL (09-13 @ 06:30)  Platelet Count - Automated: 208 K/uL (09-12 @ 10:12)  Platelet Count - Automated: 212 K/uL (09-12 @ 06:48)  Platelet Count - Automated: 247 K/uL (09-11 @ 07:30)  Platelet Count - Automated: 206 K/uL (09-10 @ 08:17)  Platelet Count - Automated: 222 K/uL (09-09 @ 15:06)      LIVER FUNCTIONS - ( 13 Sep 2021 06:30 )  Alb: 2.0 g/dL / Pro: 5.8 g/dL / ALK PHOS: 76 U/L / ALT: 29 U/L DA / AST: 21 U/L / GGT: x                 RADIOLOGY & ADDITIONAL STUDIES:

## 2021-09-13 NOTE — PROGRESS NOTE ADULT - ASSESSMENT
The patient is a 75 year old female with a history of HTN, DM, CVA, dementia, chronic respiratory failure s/p trach, PEG who is admitted after cardiac arrest.     Plan:  - ECG with RBBB and nonspecific findings  - Troponin mildly elevated at 0.127 in the setting of demand ischemia from cardiac arrest, septic shock  - Echo with normal LV systolic function, mild pulm HTN  - Off of pressors  - At times hypertensive and tachycardic due to underlying respiratory issues  - Continue aspirin 81 mg daily  - Monitor hemoglobin - may need transfusion  - On linezolid  - Mechanical ventilation  - Overall prognosis very poor

## 2021-09-13 NOTE — PROGRESS NOTE ADULT - SUBJECTIVE AND OBJECTIVE BOX
Patient is a 75y old  Female who presents with a chief complaint of Unresponsive, post-cardiac arrest (13 Sep 2021 15:11)      BRIEF HOSPITAL COURSE: 75F with PMHx of VDRF, sp PEG, functional quadriplegia,  DM, HTN, GERD, CVA, dementia who was admitted from NH s/p cardiac arrest.  Found to have UTI, aspiration PNA with septic shock requiring pressors.    Events last 24 hours: afebrile, hypertensive, periodic episodes of tachypnea, however self resolve, thought to be when she needs to defecate. Remains off pressors.      PAST MEDICAL & SURGICAL HISTORY:  Dementia of frontal lobe type    Aphasic stroke    Diabetes mellitus    Respiratory failure    Hypertension    GERD (gastroesophageal reflux disease)    Constipation    Respiratory failure    CVA (cerebral vascular accident)    HTN (hypertension)    DM (diabetes mellitus)    Advanced dementia    COVID-19 virus detected    Quadriplegia    Pneumonia    Type II diabetes mellitus    Hx of appendectomy    Gastrostomy in place    Tracheostomy in place    Tracheostomy tube present    Feeding by G-tube        Review of Systems:  unable to obtain       Medications:  linezolid    Tablet 600 milliGRAM(s) Oral every 12 hours      albuterol/ipratropium for Nebulization 3 milliLiter(s) Nebulizer every 6 hours    acetaminophen   Tablet .. 650 milliGRAM(s) Oral every 6 hours PRN  LORazepam     Tablet 1 milliGRAM(s) Oral every 6 hours  LORazepam     Tablet 0.5 milliGRAM(s) Oral every 2 hours PRN  methocarbamol 250 milliGRAM(s) Oral daily      aspirin  chewable 81 milliGRAM(s) Enteral Tube daily  enoxaparin Injectable 30 milliGRAM(s) SubCutaneous daily    bisacodyl Suppository 10 milliGRAM(s) Rectal at bedtime  pantoprazole   Suspension 40 milliGRAM(s) Enteral Tube two times a day  polyethylene glycol 3350 17 Gram(s) Oral daily  senna 2 Tablet(s) Oral at bedtime  simethicone 80 milliGRAM(s) Chew every 6 hours  sucralfate suspension 1 Gram(s) Oral four times a day      dextrose 40% Gel 15 Gram(s) Oral once  dextrose 50% Injectable 25 Gram(s) IV Push once  dextrose 50% Injectable 12.5 Gram(s) IV Push once  dextrose 50% Injectable 25 Gram(s) IV Push once  glucagon  Injectable 1 milliGRAM(s) IntraMuscular once  insulin glargine Injectable (LANTUS) 36 Unit(s) SubCutaneous at bedtime  insulin lispro (ADMELOG) corrective regimen sliding scale   SubCutaneous every 6 hours    dextrose 5%. 1000 milliLiter(s) IV Continuous <Continuous>  dextrose 5%. 1000 milliLiter(s) IV Continuous <Continuous>  sodium chloride 0.9% lock flush 10 milliLiter(s) IV Push every 1 hour PRN      chlorhexidine 0.12% Liquid 15 milliLiter(s) Oral Mucosa two times a day  chlorhexidine 2% Cloths 1 Application(s) Topical daily    lactobacillus acidophilus 2 Tablet(s) Oral daily      Mode: AC/ CMV (Assist Control/ Continuous Mandatory Ventilation)  RR (machine): 14  TV (machine): 400  FiO2: 30  PEEP: 5  ITime: 1  MAP: 13  PIP: 28      ICU Vital Signs Last 24 Hrs  T(C): 37.2 (13 Sep 2021 21:26), Max: 37.8 (13 Sep 2021 08:30)  T(F): 98.9 (13 Sep 2021 21:26), Max: 100 (13 Sep 2021 08:30)  HR: 89 (13 Sep 2021 20:06) (64 - 131)  BP: 177/92 (13 Sep 2021 19:00) (81/39 - 178/136)  BP(mean): 117 (13 Sep 2021 19:00) (53 - 147)  ABP: --  ABP(mean): --  RR: 38 (13 Sep 2021 19:00) (14 - 55)  SpO2: 98% (13 Sep 2021 20:06) (96% - 100%)          I&O's Detail    12 Sep 2021 07:01  -  13 Sep 2021 07:00  --------------------------------------------------------  IN:    Enteral Tube Flush: 960 mL    TwoCal HN: 960 mL  Total IN: 1920 mL    OUT:    Voided (mL): 800 mL  Total OUT: 800 mL    Total NET: 1120 mL      13 Sep 2021 07:01  -  13 Sep 2021 23:33  --------------------------------------------------------  IN:    Enteral Tube Flush: 440 mL    Jevity 1.5: 270 mL    TwoCal HN: 240 mL  Total IN: 950 mL    OUT:  Total OUT: 0 mL    Total NET: 950 mL            LABS:                        7.3    6.38  )-----------( 227      ( 13 Sep 2021 06:30 )             24.7     09-13    140  |  107  |  14  ----------------------------<  117<H>  3.7   |  24  |  0.99    Ca    8.1<L>      13 Sep 2021 06:30  Phos  2.6     09-13    TPro  5.8<L>  /  Alb  2.0<L>  /  TBili  0.2  /  DBili  x   /  AST  21  /  ALT  29  /  AlkPhos  76  09-13          CAPILLARY BLOOD GLUCOSE      POCT Blood Glucose.: 150 mg/dL (13 Sep 2021 23:10)    PT/INR - ( 13 Sep 2021 06:30 )   PT: 13.1 sec;   INR: 1.09 ratio             CULTURES:  Culture Results:   Numerous Serratia marcescens  Numerous Pseudomonas aeruginosa  Normal Respiratory Elvira present (09-08-21 @ 13:17)  Culture Results:   No Growth Final (09-07-21 @ 15:14)  Culture Results:   No Growth Final (09-07-21 @ 15:14)  Culture Results:   >100,000 CFU/ml Proteus mirabilis (09-07-21 @ 15:14)  Rapid RVP Result: NotDetec (09-07-21 @ 12:40)      Physical Examination:    General: vent dependent, unresponsive baseline     PULM: rales     CVS: tachycardic    ABD: Soft, nondistended, nontender, normoactive bowel sounds    EXT: No edema, nontender    SKIN: Warm, foot ulcers    NEURO: does not respond, follow commands, or have purposeful movements or reactions

## 2021-09-13 NOTE — PROGRESS NOTE ADULT - SUBJECTIVE AND OBJECTIVE BOX
Date/Time Patient Seen:  		  Referring MD:   Data Reviewed	       Patient is a 75y old  Female who presents with a chief complaint of Unresponsive, post-cardiac arrest (12 Sep 2021 14:13)      Subjective/HPI     PAST MEDICAL & SURGICAL HISTORY:  Dementia of frontal lobe type    Aphasic stroke    Diabetes mellitus    Respiratory failure    Hypertension    GERD (gastroesophageal reflux disease)    Constipation    Respiratory failure    CVA (cerebral vascular accident)    HTN (hypertension)    DM (diabetes mellitus)    Advanced dementia    COVID-19 virus detected    Quadriplegia    Pneumonia    Type II diabetes mellitus    Hx of appendectomy    Gastrostomy in place    Tracheostomy in place    Tracheostomy tube present    Feeding by G-tube          Medication list         MEDICATIONS  (STANDING):  albuterol/ipratropium for Nebulization 3 milliLiter(s) Nebulizer every 6 hours  aspirin  chewable 81 milliGRAM(s) Enteral Tube daily  bisacodyl Suppository 10 milliGRAM(s) Rectal at bedtime  chlorhexidine 0.12% Liquid 15 milliLiter(s) Oral Mucosa two times a day  chlorhexidine 2% Cloths 1 Application(s) Topical daily  dextrose 40% Gel 15 Gram(s) Oral once  dextrose 5%. 1000 milliLiter(s) (50 mL/Hr) IV Continuous <Continuous>  dextrose 5%. 1000 milliLiter(s) (100 mL/Hr) IV Continuous <Continuous>  dextrose 50% Injectable 12.5 Gram(s) IV Push once  dextrose 50% Injectable 25 Gram(s) IV Push once  dextrose 50% Injectable 25 Gram(s) IV Push once  fentaNYL   Patch  12 MICROgram(s)/Hr 1 Patch Transdermal every 72 hours  glucagon  Injectable 1 milliGRAM(s) IntraMuscular once  insulin glargine Injectable (LANTUS) 36 Unit(s) SubCutaneous at bedtime  insulin lispro (ADMELOG) corrective regimen sliding scale   SubCutaneous every 6 hours  lactobacillus acidophilus 2 Tablet(s) Oral daily  linezolid    Tablet 600 milliGRAM(s) Oral every 12 hours  LORazepam   Injectable 0.5 milliGRAM(s) IV Push three times a day  methocarbamol 250 milliGRAM(s) Oral daily  pantoprazole  Injectable 40 milliGRAM(s) IV Push two times a day  polyethylene glycol 3350 17 Gram(s) Oral daily  senna 2 Tablet(s) Oral at bedtime  simethicone 80 milliGRAM(s) Chew every 6 hours  sucralfate suspension 1 Gram(s) Oral four times a day    MEDICATIONS  (PRN):  acetaminophen   Tablet .. 650 milliGRAM(s) Oral every 6 hours PRN Temp greater or equal to 38C (100.4F), Mild Pain (1 - 3)  LORazepam   Injectable 1 milliGRAM(s) IV Push every 2 hours PRN Agitation  sodium chloride 0.9% lock flush 10 milliLiter(s) IV Push every 1 hour PRN Pre/post blood products, medications, blood draw, and to maintain line patency         Vitals log        ICU Vital Signs Last 24 Hrs  T(C): 36.7 (13 Sep 2021 04:00), Max: 37.2 (12 Sep 2021 12:30)  T(F): 98 (13 Sep 2021 04:00), Max: 99 (12 Sep 2021 12:30)  HR: 75 (13 Sep 2021 06:01) (66 - 96)  BP: 104/58 (13 Sep 2021 06:01) (104/58 - 139/59)  BP(mean): 69 (13 Sep 2021 06:01) (69 - 93)  ABP: --  ABP(mean): --  RR: 21 (13 Sep 2021 06:01) (11 - 28)  SpO2: 100% (13 Sep 2021 06:01) (98% - 100%)       Mode: AC/ CMV (Assist Control/ Continuous Mandatory Ventilation)  RR (machine): 14  TV (machine): 400  FiO2: 30  PEEP: 5  ITime: 1  MAP: 13  PIP: 30      Input and Output:  I&O's Detail    11 Sep 2021 07:01  -  12 Sep 2021 07:00  --------------------------------------------------------  IN:    Enteral Tube Flush: 765 mL    IV PiggyBack: 200 mL    Lactated Ringers: 900 mL    TwoCal HN: 960 mL  Total IN: 2825 mL    OUT:  Total OUT: 0 mL    Total NET: 2825 mL      12 Sep 2021 07:01  -  13 Sep 2021 06:34  --------------------------------------------------------  IN:    Enteral Tube Flush: 960 mL    TwoCal HN: 960 mL  Total IN: 1920 mL    OUT:    Voided (mL): 800 mL  Total OUT: 800 mL    Total NET: 1120 mL          Lab Data                        7.2    6.84  )-----------( 208      ( 12 Sep 2021 10:12 )             23.9     09-12    141  |  108  |  15  ----------------------------<  115<H>  4.1   |  23  |  0.96    Ca    7.9<L>      12 Sep 2021 06:48  Phos  2.0     09-11  Mg     1.7     09-11    TPro  6.7  /  Alb  2.2<L>  /  TBili  0.3  /  DBili  x   /  AST  38  /  ALT  45  /  AlkPhos  86  09-11            Review of Systems	      Objective     Physical Examination    heart s1s2  lung dec BS  abd soft      Pertinent Lab findings & Imaging      Annalise:  NO   Adequate UO     I&O's Detail    11 Sep 2021 07:01  -  12 Sep 2021 07:00  --------------------------------------------------------  IN:    Enteral Tube Flush: 765 mL    IV PiggyBack: 200 mL    Lactated Ringers: 900 mL    TwoCal HN: 960 mL  Total IN: 2825 mL    OUT:  Total OUT: 0 mL    Total NET: 2825 mL      12 Sep 2021 07:01  -  13 Sep 2021 06:34  --------------------------------------------------------  IN:    Enteral Tube Flush: 960 mL    TwoCal HN: 960 mL  Total IN: 1920 mL    OUT:    Voided (mL): 800 mL  Total OUT: 800 mL    Total NET: 1120 mL               Discussed with:     Cultures:	        Radiology

## 2021-09-13 NOTE — PROGRESS NOTE ADULT - ASSESSMENT
76 y/o f trach dependent, with PEG tube, DM, HTN, GERD, CVA, dementia, constipation p/w being found unresponsive at [facility], brought in by EMS for post cardiac arrest care.  Noted to be febrile with leukocytosis and bandemia.    RECOMMENDATIONS    Sepsis - pt has vaughn, chronic vent support with trach, unclear localization.     rec continue ZOSYN (started 9/7) with recs to follow but high risk for PNA, recommend 5 days so 9/11 as last day -will dc today    diabetic ulceration distal aspect hallux Left foot/ingrown left hallux nail   pt is MRSA PCR neg, has been on Zosyn so some concerns regarding this being pressure related. Acutely developed so suggest mostly likely superficial  podiatry involved  added linezolid 600mg PO BID x 5 days  so last day 9/14 9/13-pt with rapid shallow breathing, temp elevation but no true fever, off abx except for linezolid for toe, will follow cbc w diff and will order CXR port    anemia and electrolyte management per primary [facility] physician    We will follow along in the care of this patient. Please call us at [phone] or text me directly on my cell# at [phone] with any concerns.       76 y/o f trach dependent, with PEG tube, DM, HTN, GERD, CVA, dementia, constipation p/w being found unresponsive at [facility], brought in by EMS for post cardiac arrest care.  Noted to be febrile with leukocytosis and bandemia.    RECOMMENDATIONS    Sepsis - pt has vaughn, chronic vent support with trach, unclear localization.     rec continue ZOSYN (started 9/7) with recs to follow but high risk for PNA, recommend 5 days so 9/11 as last day -will dc today    diabetic ulceration distal aspect hallux Left foot/ingrown left hallux nail   pt is MRSA PCR neg, has been on Zosyn so some concerns regarding this being pressure related. Acutely developed so suggest mostly likely superficial  podiatry involved  added linezolid 600mg PO BID x 5 days  so last day 9/14 9/13-pt with rapid shallow breathing, temp elevation but no true fever, off abx except for linezolid for toe, will follow cbc w diff and will wait for read of CXR port    anemia and electrolyte management per primary [facility] physician    We will follow along in the care of this patient. Please call us at [phone] or text me directly on my cell# at [phone] with any concerns.

## 2021-09-13 NOTE — DISCHARGE NOTE NURSING/CASE MANAGEMENT/SOCIAL WORK - PATIENT PORTAL LINK FT
You can access the FollowMyHealth Patient Portal offered by [facility] by registering at the following website: [email or web address]. By joining [employer]’s FollowMyHealth portal, you will also be able to view your health information using other applications (apps) compatible with our system.

## 2021-09-13 NOTE — CHART NOTE - NSCHARTNOTEFT_GEN_A_CORE
Assessment:      75F Chronic respiratory failure trach/vent dependent, with PEG tube, functional quadriplegia/total care patient, DM2, HTN, GERD, CVA, dementia, constipation p/w being found unresponsive at [facility], brought in by EMS for post cardiac arrest care, found to have sepsis, b/l aspiration PNA, and mild colonic ileus.    9/13  MD requesting possible tube feeding formula change secondary to hx constipation.  Per pt's spouse, pt experiences episodes of what appears to be seizure-like activity/resp distress, although, it is reported that it is patient trying to have a BM.  Pt on Glucerna 1.5 11t20lmn via peg PTA.  Pt initially started on Glucerna 1.5 this admission, although possible due to mild colonic ileus, pt did not tolerate volume and TF formula changed to 2cal HN.  Recommend consider formula change to Vital 1.5 via peg to goal 50 ml/hr x 20 hrs (+NC prosource once daily) to optimize GI tolerance (to provide 1560kcal, 82g protein from formula/modular protein supplement).  Current tube feeding water flush 40ml/hr, recommend continuing with increased rate (to receive 760ml free water from formula, addtl 800ml from hourly flushes, 1560ml/d).  RD to follow-up and will continue to monitor pt's nutrition status.    Factors impacting intake: [ ] none [ ] nausea  [ ] vomiting [ ] diarrhea [x ] constipation  [ ]chewing problems [ ] swallowing issues  [ ] other:     Diet Prescription: Diet, NPO with Tube Feed:   Tube Feeding Modality: Gastrostomy  TwoCal HN  Total Volume for 24 Hours (mL): 800  Continuous  Starting Tube Feed Rate {mL per Hour}: 20  Increase Tube Feed Rate by (mL): 10     Every 6 hours  Until Goal Tube Feed Rate (mL per Hour): 40  Tube Feed Duration (in Hours): 20  Tube Feed Start Time: 12:00  Free Water Flush  Pump   Rate (mL per Hour): 40   Frequency: Every Hour (09-11-21 @ 20:46)    Intake: currently @goal rate (2 kim)    Current Weight: Weight (kg): 50 (09-07 @ 15:10)  % Weight Change  adm wt 115#  9/13 119.2  9/12 118#    Pertinent Medications: MEDICATIONS  (STANDING):  albuterol/ipratropium for Nebulization 3 milliLiter(s) Nebulizer every 6 hours  aspirin  chewable 81 milliGRAM(s) Enteral Tube daily  bisacodyl Suppository 10 milliGRAM(s) Rectal at bedtime  chlorhexidine 0.12% Liquid 15 milliLiter(s) Oral Mucosa two times a day  chlorhexidine 2% Cloths 1 Application(s) Topical daily  dextrose 40% Gel 15 Gram(s) Oral once  dextrose 5%. 1000 milliLiter(s) (50 mL/Hr) IV Continuous <Continuous>  dextrose 5%. 1000 milliLiter(s) (100 mL/Hr) IV Continuous <Continuous>  dextrose 50% Injectable 25 Gram(s) IV Push once  dextrose 50% Injectable 12.5 Gram(s) IV Push once  dextrose 50% Injectable 25 Gram(s) IV Push once  fentaNYL   Patch  12 MICROgram(s)/Hr 1 Patch Transdermal every 72 hours  glucagon  Injectable 1 milliGRAM(s) IntraMuscular once  insulin glargine Injectable (LANTUS) 36 Unit(s) SubCutaneous at bedtime  insulin lispro (ADMELOG) corrective regimen sliding scale   SubCutaneous every 6 hours  lactobacillus acidophilus 2 Tablet(s) Oral daily  linezolid    Tablet 600 milliGRAM(s) Oral every 12 hours  LORazepam   Injectable 0.5 milliGRAM(s) IV Push three times a day  methocarbamol 250 milliGRAM(s) Oral daily  pantoprazole  Injectable 40 milliGRAM(s) IV Push two times a day  polyethylene glycol 3350 17 Gram(s) Oral daily  senna 2 Tablet(s) Oral at bedtime  simethicone 80 milliGRAM(s) Chew every 6 hours  sucralfate suspension 1 Gram(s) Oral four times a day    MEDICATIONS  (PRN):  acetaminophen   Tablet .. 650 milliGRAM(s) Oral every 6 hours PRN Temp greater or equal to 38C (100.4F), Mild Pain (1 - 3)  LORazepam   Injectable 1 milliGRAM(s) IV Push every 2 hours PRN Agitation  sodium chloride 0.9% lock flush 10 milliLiter(s) IV Push every 1 hour PRN Pre/post blood products, medications, blood draw, and to maintain line patency    Pertinent Labs: 09-13 Na140 mmol/L Glu 117 mg/dL<H> K+ 3.7 mmol/L Cr  0.99 mg/dL BUN 14 mg/dL 09-13 Phos 2.6 mg/dL 09-13 Alb 2.0 g/dL<L>     CAPILLARY BLOOD GLUCOSE      POCT Blood Glucose.: 118 mg/dL (13 Sep 2021 05:15)  POCT Blood Glucose.: 175 mg/dL (12 Sep 2021 22:26)  POCT Blood Glucose.: 135 mg/dL (12 Sep 2021 18:32)  POCT Blood Glucose.: 154 mg/dL (12 Sep 2021 11:50)    Skin: scab to l big toe    Estimated Needs:   [x ] no change since previous assessment  [ ] recalculated:     Previous Nutrition Diagnosis:   [ ] Inadequate Energy Intake [ ]Inadequate Oral Intake [ ] Excessive Energy Intake   [ ] Underweight [ ] Increased Nutrient Needs [ ] Overweight/Obesity [x] swallowing difficulty   [ ] Altered GI Function [ ] Unintended Weight Loss [ ] Food & Nutrition Related Knowledge Deficit [ ] Malnutrition     Nutrition Diagnosis is [ x] ongoing  [ ] resolved [ ] not applicable     New Nutrition Diagnosis: [ ] not applicable       Interventions: change TF formula and assess for tolerance  Recommend  [ ] Change Diet To:  [ ] Nutrition Supplement  [ x] Nutrition Support - rate as indicated above (vital 1.5)  [ ] Other:     Monitoring and Evaluation:   [ ] PO intake [ x ] Tolerance to EN[ x ] weights [ x ] labs[ x ] follow up per protocol  [ ] other:

## 2021-09-13 NOTE — PROGRESS NOTE ADULT - SUBJECTIVE AND OBJECTIVE BOX
Chief Complaint: Cardiac arrest    Interval Events: No events overnight.    Review of Systems:  Unable to obtain    Physical Exam:  Vital Signs Last 24 Hrs  T(C): 36.7 (13 Sep 2021 08:15), Max: 37.2 (12 Sep 2021 12:30)  T(F): 98.1 (13 Sep 2021 08:15), Max: 99 (12 Sep 2021 12:30)  HR: 131 (13 Sep 2021 10:00) (64 - 131)  BP: 178/136 (13 Sep 2021 10:00) (104/58 - 178/136)  BP(mean): 147 (13 Sep 2021 10:00) (69 - 147)  RR: 42 (13 Sep 2021 10:00) (14 - 51)  SpO2: 98% (13 Sep 2021 10:00) (97% - 100%)  General: Chronically ill appearing  HEENT: Trach  Neck: No JVD, no carotid bruit  Lungs: CTAB  CV: RRR, nl S1/S2, no M/R/G  Abdomen: S/NT/ND, +BS  Extremities: No LE edema, no cyanosis  Neuro: AAOx0, contracted  Skin: No rash    Labs:             09-13    140  |  107  |  14  ----------------------------<  117<H>  3.7   |  24  |  0.99    Ca    8.1<L>      13 Sep 2021 06:30  Phos  2.6     09-13    TPro  5.8<L>  /  Alb  2.0<L>  /  TBili  0.2  /  DBili  x   /  AST  21  /  ALT  29  /  AlkPhos  76  09-13                        7.3    6.38  )-----------( 227      ( 13 Sep 2021 06:30 )             24.7       Telemetry: Sinus rhythm

## 2021-09-13 NOTE — PROGRESS NOTE ADULT - SUBJECTIVE AND OBJECTIVE BOX
[facility] DIVISION of INFECTIOUS DISEASE  Arturo Olivas MD PhD, Haylee Umanzor MD, Andressa Brantley MD, Billy Valenzuela MD  and providing coverage with Alexa Canas MD and Eusebio Chairez MD  Providing Infectious Disease Consultations at [facility], [facility], [facility]'s    Office# [phone] to schedule follow up appointments  Answering Service for urgent calls or New Consults [phone]  Cell# to text for urgent issues Arturo Olivas [phone]     infectious diseases progress note:    BERA BECKHAM is a 75y y. o. Female patient    Patient anxious over breathing the ventsubjective    Allergies    codeine (Hives)    Intolerances        ANTIBIOTICS/RELEVANT:  antimicrobials  linezolid    Tablet 600 milliGRAM(s) Oral every 12 hours    immunologic:    OTHER:  acetaminophen   Tablet .. 650 milliGRAM(s) Oral every 6 hours PRN  albuterol/ipratropium for Nebulization 3 milliLiter(s) Nebulizer every 6 hours  aspirin  chewable 81 milliGRAM(s) Enteral Tube daily  bisacodyl Suppository 10 milliGRAM(s) Rectal at bedtime  chlorhexidine 0.12% Liquid 15 milliLiter(s) Oral Mucosa two times a day  chlorhexidine 2% Cloths 1 Application(s) Topical daily  dextrose 40% Gel 15 Gram(s) Oral once  dextrose 5%. 1000 milliLiter(s) IV Continuous <Continuous>  dextrose 5%. 1000 milliLiter(s) IV Continuous <Continuous>  dextrose 50% Injectable 25 Gram(s) IV Push once  dextrose 50% Injectable 12.5 Gram(s) IV Push once  dextrose 50% Injectable 25 Gram(s) IV Push once  fentaNYL   Patch  12 MICROgram(s)/Hr 1 Patch Transdermal every 72 hours  glucagon  Injectable 1 milliGRAM(s) IntraMuscular once  insulin glargine Injectable (LANTUS) 36 Unit(s) SubCutaneous at bedtime  insulin lispro (ADMELOG) corrective regimen sliding scale   SubCutaneous every 6 hours  lactobacillus acidophilus 2 Tablet(s) Oral daily  LORazepam   Injectable 1 milliGRAM(s) IV Push every 2 hours PRN  LORazepam   Injectable 0.5 milliGRAM(s) IV Push three times a day  methocarbamol 250 milliGRAM(s) Oral daily  pantoprazole  Injectable 40 milliGRAM(s) IV Push two times a day  polyethylene glycol 3350 17 Gram(s) Oral daily  senna 2 Tablet(s) Oral at bedtime  simethicone 80 milliGRAM(s) Chew every 6 hours  sodium chloride 0.9% lock flush 10 milliLiter(s) IV Push every 1 hour PRN  sucralfate suspension 1 Gram(s) Oral four times a day      Objective:  Last 24-Vital Signs Last 24 Hrs  T(C): 36.7 (13 Sep 2021 08:15), Max: 37.2 (12 Sep 2021 12:30)  T(F): 98.1 (13 Sep 2021 08:15), Max: 99 (12 Sep 2021 12:30)  HR: 131 (13 Sep 2021 10:00) (64 - 131)  BP: 178/136 (13 Sep 2021 10:00) (104/58 - 178/136)  BP(mean): 147 (13 Sep 2021 10:00) (69 - 147)  RR: 42 (13 Sep 2021 10:00) (14 - 51)  SpO2: 98% (13 Sep 2021 10:00) (97% - 100%)    T(C): 36.7 (09-13-21 @ 08:15), Max: 37.2 (09-12-21 @ 12:30)  T(F): 98.1 (09-13-21 @ 08:15), Max: 99 (09-12-21 @ 12:30)  T(C): 36.7 (09-13-21 @ 08:15), Max: 37.2 (09-12-21 @ 12:30)  T(F): 98.1 (09-13-21 @ 08:15), Max: 99 (09-12-21 @ 12:30)  T(C): 36.7 (09-13-21 @ 08:15), Max: 37.7 (09-09-21 @ 12:00)  T(F): 98.1 (09-13-21 @ 08:15), Max: 99.9 (09-09-21 @ 12:00)    PHYSICAL EXAM:  Constitutional: Well-developed, well nourished, drenched in sweat, anxious  Eyes: PERRLA, EOMI  Ear/Nose/Throat: oropharynx normal	  Neck: no JVD, no lymphadenopathy, supple  Respiratory: noted rapid shallow breathing  Cardiovascular: distant  Gastrointestinal: soft, NT, no HSM, BS-normal  Extremities: no clubbing, no cyanosis, edema absent  Neuro: patient alert,     Mode: AC/ CMV RR (machine): 14, TV (machine): 400, FiO2: 30, PEEP: 5, ITime: 1, MAP: 12, PIP: 28    LABS:                        7.3    6.38  )-----------( 227      ( 13 Sep 2021 06:30 )             24.7       WBC 6.38  09-13 @ 06:30  WBC 6.84  09-12 @ 10:12  WBC 6.08  09-12 @ 06:48  WBC 7.52  09-11 @ 07:30  WBC 5.50  09-10 @ 08:17  WBC 7.69  09-09 @ 15:06  WBC 4.84  09-09 @ 07:04  WBC 7.65  09-08 @ 09:03  WBC 8.44  09-08 @ 06:55  WBC 26.45  09-07 @ 10:38      09-13    140  |  107  |  14  ----------------------------<  117<H>  3.7   |  24  |  0.99    Ca    8.1<L>      13 Sep 2021 06:30  Phos  2.6     09-13    TPro  5.8<L>  /  Alb  2.0<L>  /  TBili  0.2  /  DBili  x   /  AST  21  /  ALT  29  /  AlkPhos  76  09-13      Creatinine, Serum: 0.99 mg/dL (09-13-21 @ 06:30)  Creatinine, Serum: 0.96 mg/dL (09-12-21 @ 06:48)  Creatinine, Serum: 1.12 mg/dL (09-11-21 @ 07:30)  Creatinine, Serum: 1.08 mg/dL (09-10-21 @ 08:17)  Creatinine, Serum: 1.24 mg/dL (09-09-21 @ 07:04)  Creatinine, Serum: 1.61 mg/dL (09-08-21 @ 06:55)  Creatinine, Serum: 2.58 mg/dL (09-07-21 @ 10:56)      PT/INR - ( 13 Sep 2021 06:30 )   PT: 13.1 sec;   INR: 1.09 ratio                   MICROBIOLOGY:              RADIOLOGY & ADDITIONAL STUDIES:

## 2021-09-13 NOTE — PROGRESS NOTE ADULT - SUBJECTIVE AND OBJECTIVE BOX
Chief Complaint:        INTERVAL HPI/OVERNIGHT EVENTS:  remains vented   no events reported.       MEDICATIONS  (STANDING):  albuterol/ipratropium for Nebulization 3 milliLiter(s) Nebulizer every 6 hours  aspirin  chewable 81 milliGRAM(s) Enteral Tube daily  bisacodyl Suppository 10 milliGRAM(s) Rectal at bedtime  chlorhexidine 0.12% Liquid 15 milliLiter(s) Oral Mucosa two times a day  chlorhexidine 2% Cloths 1 Application(s) Topical daily  dextrose 40% Gel 15 Gram(s) Oral once  dextrose 5%. 1000 milliLiter(s) (50 mL/Hr) IV Continuous <Continuous>  dextrose 5%. 1000 milliLiter(s) (100 mL/Hr) IV Continuous <Continuous>  dextrose 50% Injectable 25 Gram(s) IV Push once  dextrose 50% Injectable 12.5 Gram(s) IV Push once  dextrose 50% Injectable 25 Gram(s) IV Push once  fentaNYL   Patch  12 MICROgram(s)/Hr 1 Patch Transdermal every 72 hours  glucagon  Injectable 1 milliGRAM(s) IntraMuscular once  insulin glargine Injectable (LANTUS) 36 Unit(s) SubCutaneous at bedtime  insulin lispro (ADMELOG) corrective regimen sliding scale   SubCutaneous every 6 hours  lactobacillus acidophilus 2 Tablet(s) Oral daily  linezolid    Tablet 600 milliGRAM(s) Oral every 12 hours  LORazepam   Injectable 0.5 milliGRAM(s) IV Push three times a day  methocarbamol 250 milliGRAM(s) Oral daily  pantoprazole  Injectable 40 milliGRAM(s) IV Push two times a day  polyethylene glycol 3350 17 Gram(s) Oral daily  senna 2 Tablet(s) Oral at bedtime  simethicone 80 milliGRAM(s) Chew every 6 hours  sucralfate suspension 1 Gram(s) Oral four times a day    MEDICATIONS  (PRN):  acetaminophen   Tablet .. 650 milliGRAM(s) Oral every 6 hours PRN Temp greater or equal to 38C (100.4F), Mild Pain (1 - 3)  LORazepam   Injectable 1 milliGRAM(s) IV Push every 2 hours PRN Agitation  sodium chloride 0.9% lock flush 10 milliLiter(s) IV Push every 1 hour PRN Pre/post blood products, medications, blood draw, and to maintain line patency            Vital Signs Last 24 Hrs  T(C): 36.7 (13 Sep 2021 08:15), Max: 37.2 (12 Sep 2021 12:30)  T(F): 98.1 (13 Sep 2021 08:15), Max: 99 (12 Sep 2021 12:30)  HR: 131 (13 Sep 2021 10:00) (64 - 131)  BP: 178/136 (13 Sep 2021 10:00) (104/58 - 178/136)  BP(mean): 147 (13 Sep 2021 10:00) (69 - 147)  RR: 42 (13 Sep 2021 10:00) (14 - 51)  SpO2: 98% (13 Sep 2021 10:00) (97% - 100%)    Physical exam:      abd soft, peg c/d/i  cv s1s2  chest air enty  heent tc      anicteric      LABS:                        7.3    6.38  )-----------( 227      ( 13 Sep 2021 06:30 )             24.7     09-13    140  |  107  |  14  ----------------------------<  117<H>  3.7   |  24  |  0.99    Ca    8.1<L>      13 Sep 2021 06:30  Phos  2.6     09-13    TPro  5.8<L>  /  Alb  2.0<L>  /  TBili  0.2  /  DBili  x   /  AST  21  /  ALT  29  /  AlkPhos  76  09-13    PT/INR - ( 13 Sep 2021 06:30 )   PT: 13.1 sec;   INR: 1.09 ratio               RADIOLOGY & ADDITIONAL TESTS:

## 2021-09-13 NOTE — PROGRESS NOTE ADULT - SUBJECTIVE AND OBJECTIVE BOX
PROGRESS NOTE   Patient is a 75y old  Female who presents with a chief complaint of Unresponsive, post-cardiac arrest (13 Sep 2021 13:11)      HPI:  76 y/o f trach dependent, with PEG tube, DM, HTN, GERD, CVA, dementia, constipation p/w being found unresponsive at [facility], brought in by EMS for post cardiac arrest care.       In ED, pt was given IVF NS bolus, was foudn to be hypotensive, and started on levophed for pressor support, and CT showed signs of aspiration PNA, an dpt was started on IV zosyn.     (07 Sep 2021 12:19)      Vital Signs Last 24 Hrs  T(C): 37.3 (13 Sep 2021 12:00), Max: 37.8 (13 Sep 2021 08:30)  T(F): 99.1 (13 Sep 2021 12:00), Max: 100 (13 Sep 2021 08:30)  HR: 84 (13 Sep 2021 14:35) (64 - 131)  BP: 117/57 (13 Sep 2021 12:00) (83/41 - 178/136)  BP(mean): 73 (13 Sep 2021 12:00) (54 - 147)  RR: 23 (13 Sep 2021 12:00) (14 - 51)  SpO2: 100% (13 Sep 2021 14:35) (96% - 100%)                          7.3    6.38  )-----------( 227      ( 13 Sep 2021 06:30 )             24.7               09-13    140  |  107  |  14  ----------------------------<  117<H>  3.7   |  24  |  0.99    Ca    8.1<L>      13 Sep 2021 06:30  Phos  2.6     09-13    TPro  5.8<L>  /  Alb  2.0<L>  /  TBili  0.2  /  DBili  x   /  AST  21  /  ALT  29  /  AlkPhos  76  09-13      PHYSICAL EXAM  GEN: BREA BECKHAM is a 75y Female in no acute distress on a ventilator and non responsive  LE Focused:   noted the ulcerative lesion on the distal aspect of the left hallux is responding favorable to the current care plan. There is decreased erythema, edema and calor noted. This is improved from previous levels. There is a positive eschar noted about the distal aspect of the left hallux. Noted is what appears to be a new ulcerative lesion on the distal aspect  of the right hallux with a partially avulsed right hallux nail.  There is a positive eschar noted about the distal aspect of the right hallux.

## 2021-09-13 NOTE — PROGRESS NOTE ADULT - ASSESSMENT
75F Chronic respiratory failure trach/vent dependent, with PEG tube, functional quadriplegia/total care patient, DM2, HTN, GERD, CVA, dementia, constipation p/w being found unresponsive at [facility], brought in by EMS for post cardiac arrest care, found to have sepsis, b/l aspiration PNA, and mild colonic ileus

## 2021-09-14 LAB
ALBUMIN SERPL ELPH-MCNC: 2.1 G/DL — LOW (ref 3.3–5)
ALP SERPL-CCNC: 76 U/L — SIGNIFICANT CHANGE UP (ref 30–120)
ALT FLD-CCNC: 30 U/L DA — SIGNIFICANT CHANGE UP (ref 10–60)
ANION GAP SERPL CALC-SCNC: 10 MMOL/L — SIGNIFICANT CHANGE UP (ref 5–17)
AST SERPL-CCNC: 25 U/L — SIGNIFICANT CHANGE UP (ref 10–40)
BASOPHILS # BLD AUTO: 0.01 K/UL — SIGNIFICANT CHANGE UP (ref 0–0.2)
BASOPHILS NFR BLD AUTO: 0.2 % — SIGNIFICANT CHANGE UP (ref 0–2)
BILIRUB SERPL-MCNC: 0.3 MG/DL — SIGNIFICANT CHANGE UP (ref 0.2–1.2)
BLD GP AB SCN SERPL QL: SIGNIFICANT CHANGE UP
BUN SERPL-MCNC: 23 MG/DL — SIGNIFICANT CHANGE UP (ref 7–23)
CALCIUM SERPL-MCNC: 8.6 MG/DL — SIGNIFICANT CHANGE UP (ref 8.4–10.5)
CHLORIDE SERPL-SCNC: 106 MMOL/L — SIGNIFICANT CHANGE UP (ref 96–108)
CO2 SERPL-SCNC: 23 MMOL/L — SIGNIFICANT CHANGE UP (ref 22–31)
CREAT SERPL-MCNC: 1.2 MG/DL — SIGNIFICANT CHANGE UP (ref 0.5–1.3)
EOSINOPHIL # BLD AUTO: 0.09 K/UL — SIGNIFICANT CHANGE UP (ref 0–0.5)
EOSINOPHIL NFR BLD AUTO: 1.8 % — SIGNIFICANT CHANGE UP (ref 0–6)
GLUCOSE BLDC GLUCOMTR-MCNC: 130 MG/DL — HIGH (ref 70–99)
GLUCOSE BLDC GLUCOMTR-MCNC: 184 MG/DL — HIGH (ref 70–99)
GLUCOSE BLDC GLUCOMTR-MCNC: 186 MG/DL — HIGH (ref 70–99)
GLUCOSE SERPL-MCNC: 188 MG/DL — HIGH (ref 70–99)
HCT VFR BLD CALC: 21.8 % — LOW (ref 34.5–45)
HCT VFR BLD CALC: 28.5 % — LOW (ref 34.5–45)
HGB BLD-MCNC: 6.5 G/DL — CRITICAL LOW (ref 11.5–15.5)
HGB BLD-MCNC: 8.7 G/DL — LOW (ref 11.5–15.5)
IMM GRANULOCYTES NFR BLD AUTO: 0.4 % — SIGNIFICANT CHANGE UP (ref 0–1.5)
LYMPHOCYTES # BLD AUTO: 1.43 K/UL — SIGNIFICANT CHANGE UP (ref 1–3.3)
LYMPHOCYTES # BLD AUTO: 28.1 % — SIGNIFICANT CHANGE UP (ref 13–44)
MCHC RBC-ENTMCNC: 26.9 PG — LOW (ref 27–34)
MCHC RBC-ENTMCNC: 27.4 PG — SIGNIFICANT CHANGE UP (ref 27–34)
MCHC RBC-ENTMCNC: 29.8 GM/DL — LOW (ref 32–36)
MCHC RBC-ENTMCNC: 30.5 GM/DL — LOW (ref 32–36)
MCV RBC AUTO: 89.9 FL — SIGNIFICANT CHANGE UP (ref 80–100)
MCV RBC AUTO: 90.1 FL — SIGNIFICANT CHANGE UP (ref 80–100)
MONOCYTES # BLD AUTO: 0.61 K/UL — SIGNIFICANT CHANGE UP (ref 0–0.9)
MONOCYTES NFR BLD AUTO: 12 % — SIGNIFICANT CHANGE UP (ref 2–14)
NEUTROPHILS # BLD AUTO: 2.92 K/UL — SIGNIFICANT CHANGE UP (ref 1.8–7.4)
NEUTROPHILS NFR BLD AUTO: 57.5 % — SIGNIFICANT CHANGE UP (ref 43–77)
NRBC # BLD: 0 /100 WBCS — SIGNIFICANT CHANGE UP (ref 0–0)
NRBC # BLD: 0 /100 WBCS — SIGNIFICANT CHANGE UP (ref 0–0)
PLATELET # BLD AUTO: 210 K/UL — SIGNIFICANT CHANGE UP (ref 150–400)
PLATELET # BLD AUTO: 214 K/UL — SIGNIFICANT CHANGE UP (ref 150–400)
POTASSIUM SERPL-MCNC: 4.1 MMOL/L — SIGNIFICANT CHANGE UP (ref 3.5–5.3)
POTASSIUM SERPL-SCNC: 4.1 MMOL/L — SIGNIFICANT CHANGE UP (ref 3.5–5.3)
PROT SERPL-MCNC: 6.1 G/DL — SIGNIFICANT CHANGE UP (ref 6–8.3)
RBC # BLD: 2.42 M/UL — LOW (ref 3.8–5.2)
RBC # BLD: 3.17 M/UL — LOW (ref 3.8–5.2)
RBC # FLD: 17.1 % — HIGH (ref 10.3–14.5)
RBC # FLD: 17.8 % — HIGH (ref 10.3–14.5)
SODIUM SERPL-SCNC: 139 MMOL/L — SIGNIFICANT CHANGE UP (ref 135–145)
WBC # BLD: 5.08 K/UL — SIGNIFICANT CHANGE UP (ref 3.8–10.5)
WBC # BLD: 6.36 K/UL — SIGNIFICANT CHANGE UP (ref 3.8–10.5)
WBC # FLD AUTO: 5.08 K/UL — SIGNIFICANT CHANGE UP (ref 3.8–10.5)
WBC # FLD AUTO: 6.36 K/UL — SIGNIFICANT CHANGE UP (ref 3.8–10.5)

## 2021-09-14 PROCEDURE — 74176 CT ABD & PELVIS W/O CONTRAST: CPT | Mod: 26

## 2021-09-14 PROCEDURE — 99233 SBSQ HOSP IP/OBS HIGH 50: CPT

## 2021-09-14 RX ORDER — SODIUM CHLORIDE 9 MG/ML
1000 INJECTION, SOLUTION INTRAVENOUS
Refills: 0 | Status: DISCONTINUED | OUTPATIENT
Start: 2021-09-14 | End: 2021-09-15

## 2021-09-14 RX ADMIN — CHLORHEXIDINE GLUCONATE 1 APPLICATION(S): 213 SOLUTION TOPICAL at 11:05

## 2021-09-14 RX ADMIN — Medication 1 MILLIGRAM(S): at 17:09

## 2021-09-14 RX ADMIN — Medication 2 TABLET(S): at 11:06

## 2021-09-14 RX ADMIN — LINEZOLID 600 MILLIGRAM(S): 600 INJECTION, SOLUTION INTRAVENOUS at 05:57

## 2021-09-14 RX ADMIN — Medication 81 MILLIGRAM(S): at 11:05

## 2021-09-14 RX ADMIN — CHLORHEXIDINE GLUCONATE 15 MILLILITER(S): 213 SOLUTION TOPICAL at 05:58

## 2021-09-14 RX ADMIN — SIMETHICONE 80 MILLIGRAM(S): 80 TABLET, CHEWABLE ORAL at 05:57

## 2021-09-14 RX ADMIN — METHOCARBAMOL 250 MILLIGRAM(S): 500 TABLET, FILM COATED ORAL at 11:05

## 2021-09-14 RX ADMIN — SIMETHICONE 80 MILLIGRAM(S): 80 TABLET, CHEWABLE ORAL at 11:05

## 2021-09-14 RX ADMIN — Medication 1 GRAM(S): at 11:05

## 2021-09-14 RX ADMIN — SIMETHICONE 80 MILLIGRAM(S): 80 TABLET, CHEWABLE ORAL at 17:10

## 2021-09-14 RX ADMIN — Medication 3 MILLILITER(S): at 19:31

## 2021-09-14 RX ADMIN — Medication 2: at 06:12

## 2021-09-14 RX ADMIN — PANTOPRAZOLE SODIUM 40 MILLIGRAM(S): 20 TABLET, DELAYED RELEASE ORAL at 05:57

## 2021-09-14 RX ADMIN — SODIUM CHLORIDE 75 MILLILITER(S): 9 INJECTION, SOLUTION INTRAVENOUS at 20:08

## 2021-09-14 RX ADMIN — Medication 3 MILLILITER(S): at 00:47

## 2021-09-14 RX ADMIN — Medication 2: at 11:40

## 2021-09-14 RX ADMIN — Medication 1 MILLIGRAM(S): at 11:04

## 2021-09-14 RX ADMIN — FENTANYL CITRATE 1 PATCH: 50 INJECTION INTRAVENOUS at 19:40

## 2021-09-14 RX ADMIN — Medication 3 MILLILITER(S): at 06:31

## 2021-09-14 RX ADMIN — CHLORHEXIDINE GLUCONATE 15 MILLILITER(S): 213 SOLUTION TOPICAL at 17:09

## 2021-09-14 RX ADMIN — FENTANYL CITRATE 1 PATCH: 50 INJECTION INTRAVENOUS at 07:03

## 2021-09-14 RX ADMIN — SENNA PLUS 2 TABLET(S): 8.6 TABLET ORAL at 21:53

## 2021-09-14 RX ADMIN — POLYETHYLENE GLYCOL 3350 17 GRAM(S): 17 POWDER, FOR SOLUTION ORAL at 11:06

## 2021-09-14 RX ADMIN — PANTOPRAZOLE SODIUM 40 MILLIGRAM(S): 20 TABLET, DELAYED RELEASE ORAL at 17:10

## 2021-09-14 RX ADMIN — Medication 10 MILLIGRAM(S): at 21:53

## 2021-09-14 RX ADMIN — Medication 3 MILLILITER(S): at 12:36

## 2021-09-14 RX ADMIN — Medication 2 MILLIGRAM(S): at 01:35

## 2021-09-14 RX ADMIN — Medication 1 GRAM(S): at 05:57

## 2021-09-14 RX ADMIN — LINEZOLID 600 MILLIGRAM(S): 600 INJECTION, SOLUTION INTRAVENOUS at 17:10

## 2021-09-14 RX ADMIN — Medication 1 MILLIGRAM(S): at 05:57

## 2021-09-14 RX ADMIN — Medication 1 GRAM(S): at 17:10

## 2021-09-14 NOTE — PROVIDER CONTACT NOTE (CRITICAL VALUE NOTIFICATION) - NAME OF MD/NP/PA/DO NOTIFIED:
Dr José Scanlon ([facility])
Dr Merrill
Dr Desirae AVILES
Onofre OLIVERA
WESLEY Rivas

## 2021-09-14 NOTE — PROGRESS NOTE ADULT - SUBJECTIVE AND OBJECTIVE BOX
Date/Time Patient Seen:  		  Referring MD:   Data Reviewed	       Patient is a 75y old  Female who presents with a chief complaint of Unresponsive, post-cardiac arrest (13 Sep 2021 23:33)      Subjective/HPI     PAST MEDICAL & SURGICAL HISTORY:  Dementia of frontal lobe type    Aphasic stroke    Diabetes mellitus    Respiratory failure    Hypertension    GERD (gastroesophageal reflux disease)    Constipation    Respiratory failure    CVA (cerebral vascular accident)    HTN (hypertension)    DM (diabetes mellitus)    Advanced dementia    COVID-19 virus detected    Quadriplegia    Pneumonia    Type II diabetes mellitus    Hx of appendectomy    Gastrostomy in place    Tracheostomy in place    Tracheostomy tube present    Feeding by G-tube          Medication list         MEDICATIONS  (STANDING):  albuterol/ipratropium for Nebulization 3 milliLiter(s) Nebulizer every 6 hours  aspirin  chewable 81 milliGRAM(s) Enteral Tube daily  bisacodyl Suppository 10 milliGRAM(s) Rectal at bedtime  chlorhexidine 0.12% Liquid 15 milliLiter(s) Oral Mucosa two times a day  chlorhexidine 2% Cloths 1 Application(s) Topical daily  dextrose 40% Gel 15 Gram(s) Oral once  dextrose 5%. 1000 milliLiter(s) (50 mL/Hr) IV Continuous <Continuous>  dextrose 5%. 1000 milliLiter(s) (100 mL/Hr) IV Continuous <Continuous>  dextrose 50% Injectable 25 Gram(s) IV Push once  dextrose 50% Injectable 12.5 Gram(s) IV Push once  dextrose 50% Injectable 25 Gram(s) IV Push once  enoxaparin Injectable 30 milliGRAM(s) SubCutaneous daily  glucagon  Injectable 1 milliGRAM(s) IntraMuscular once  insulin glargine Injectable (LANTUS) 36 Unit(s) SubCutaneous at bedtime  insulin lispro (ADMELOG) corrective regimen sliding scale   SubCutaneous every 6 hours  lactobacillus acidophilus 2 Tablet(s) Oral daily  linezolid    Tablet 600 milliGRAM(s) Oral every 12 hours  LORazepam     Tablet 1 milliGRAM(s) Oral every 6 hours  methocarbamol 250 milliGRAM(s) Oral daily  pantoprazole   Suspension 40 milliGRAM(s) Enteral Tube two times a day  polyethylene glycol 3350 17 Gram(s) Oral daily  senna 2 Tablet(s) Oral at bedtime  simethicone 80 milliGRAM(s) Chew every 6 hours  sucralfate suspension 1 Gram(s) Oral four times a day    MEDICATIONS  (PRN):  acetaminophen   Tablet .. 650 milliGRAM(s) Oral every 6 hours PRN Temp greater or equal to 38C (100.4F), Mild Pain (1 - 3)  LORazepam     Tablet 0.5 milliGRAM(s) Oral every 2 hours PRN Agitation  sodium chloride 0.9% lock flush 10 milliLiter(s) IV Push every 1 hour PRN Pre/post blood products, medications, blood draw, and to maintain line patency         Vitals log        ICU Vital Signs Last 24 Hrs  T(C): 36.8 (14 Sep 2021 04:00), Max: 37.8 (13 Sep 2021 08:30)  T(F): 98.2 (14 Sep 2021 04:00), Max: 100 (13 Sep 2021 08:30)  HR: 72 (14 Sep 2021 06:28) (64 - 131)  BP: 110/52 (14 Sep 2021 06:00) (81/39 - 178/136)  BP(mean): 70 (14 Sep 2021 06:00) (53 - 147)  ABP: --  ABP(mean): --  RR: 18 (14 Sep 2021 06:00) (17 - 55)  SpO2: 100% (14 Sep 2021 06:28) (96% - 100%)       Mode: AC/ CMV (Assist Control/ Continuous Mandatory Ventilation)  RR (machine): 14  TV (machine): 400  FiO2: 30  PEEP: 5  ITime: 1  MAP: 11  PIP: 22      Input and Output:  I&O's Detail    12 Sep 2021 07:01  -  13 Sep 2021 07:00  --------------------------------------------------------  IN:    Enteral Tube Flush: 960 mL    TwoCal HN: 960 mL  Total IN: 1920 mL    OUT:    Voided (mL): 800 mL  Total OUT: 800 mL    Total NET: 1120 mL      13 Sep 2021 07:01  -  14 Sep 2021 06:52  --------------------------------------------------------  IN:    Enteral Tube Flush: 440 mL    Free Water: 400 mL    Jevity 1.5: 720 mL    TwoCal HN: 240 mL  Total IN: 1800 mL    OUT:    Voided (mL): 350 mL  Total OUT: 350 mL    Total NET: 1450 mL          Lab Data                        7.3    6.38  )-----------( 227      ( 13 Sep 2021 06:30 )             24.7     09-13    140  |  107  |  14  ----------------------------<  117<H>  3.7   |  24  |  0.99    Ca    8.1<L>      13 Sep 2021 06:30  Phos  2.6     09-13    TPro  5.8<L>  /  Alb  2.0<L>  /  TBili  0.2  /  DBili  x   /  AST  21  /  ALT  29  /  AlkPhos  76  09-13            Review of Systems	      Objective     Physical Examination    heart s1s2  lung dec BS  abd soft      Pertinent Lab findings & Imaging      Annalise:  NO   Adequate UO     I&O's Detail    12 Sep 2021 07:01  -  13 Sep 2021 07:00  --------------------------------------------------------  IN:    Enteral Tube Flush: 960 mL    TwoCal HN: 960 mL  Total IN: 1920 mL    OUT:    Voided (mL): 800 mL  Total OUT: 800 mL    Total NET: 1120 mL      13 Sep 2021 07:01  -  14 Sep 2021 06:52  --------------------------------------------------------  IN:    Enteral Tube Flush: 440 mL    Free Water: 400 mL    Jevity 1.5: 720 mL    TwoCal HN: 240 mL  Total IN: 1800 mL    OUT:    Voided (mL): 350 mL  Total OUT: 350 mL    Total NET: 1450 mL               Discussed with:     Cultures:	        Radiology

## 2021-09-14 NOTE — PROGRESS NOTE ADULT - ASSESSMENT
The patient is a 75 year old female with a history of HTN, DM, CVA, dementia, chronic respiratory failure s/p trach, PEG who is admitted after cardiac arrest.     Plan:  - ECG with RBBB and nonspecific findings  - Troponin mildly elevated at 0.127 in the setting of demand ischemia from cardiac arrest, septic shock  - Echo with normal LV systolic function, mild pulm HTN  - Off of pressors  - At times hypertensive and tachycardic due to underlying respiratory issues  - Continue aspirin 81 mg daily - can hold if needed due to anemia  - Monitor hemoglobin - transfuse today  - On linezolid  - Mechanical ventilation  - Overall prognosis very poor

## 2021-09-14 NOTE — PROGRESS NOTE ADULT - SUBJECTIVE AND OBJECTIVE BOX
BREA BECKHAM    [facility]    Patient is a 75y old  Female who presents with a chief complaint of Unresponsive, post-cardiac arrest (14 Sep 2021 09:36)       Allergies    codeine (Hives)    Intolerances        HPI:  74 y/o f trach dependent, with PEG tube, DM, HTN, GERD, CVA, dementia, constipation p/w being found unresponsive at [facility], brought in by EMS for post cardiac arrest care.       In ED, pt was given IVF NS bolus, was foudn to be hypotensive, and started on levophed for pressor support, and CT showed signs of aspiration PNA, an dpt was started on IV zosyn.     (07 Sep 2021 12:19)      PAST MEDICAL & SURGICAL HISTORY:  Dementia of frontal lobe type    Aphasic stroke    Diabetes mellitus    Respiratory failure    Hypertension    GERD (gastroesophageal reflux disease)    Constipation    Respiratory failure    CVA (cerebral vascular accident)    HTN (hypertension)    DM (diabetes mellitus)    Advanced dementia    COVID-19 virus detected    Quadriplegia    Pneumonia    Type II diabetes mellitus    Hx of appendectomy    Gastrostomy in place    Tracheostomy in place    Tracheostomy tube present    Feeding by G-tube        FAMILY HISTORY:  No pertinent family history in first degree relatives          MEDICATIONS   acetaminophen   Tablet .. 650 milliGRAM(s) Oral every 6 hours PRN  albuterol/ipratropium for Nebulization 3 milliLiter(s) Nebulizer every 6 hours  aspirin  chewable 81 milliGRAM(s) Enteral Tube daily  bisacodyl Suppository 10 milliGRAM(s) Rectal at bedtime  chlorhexidine 0.12% Liquid 15 milliLiter(s) Oral Mucosa two times a day  chlorhexidine 2% Cloths 1 Application(s) Topical daily  dextrose 40% Gel 15 Gram(s) Oral once  dextrose 5%. 1000 milliLiter(s) IV Continuous <Continuous>  dextrose 5%. 1000 milliLiter(s) IV Continuous <Continuous>  dextrose 50% Injectable 25 Gram(s) IV Push once  dextrose 50% Injectable 12.5 Gram(s) IV Push once  dextrose 50% Injectable 25 Gram(s) IV Push once  enoxaparin Injectable 30 milliGRAM(s) SubCutaneous daily  glucagon  Injectable 1 milliGRAM(s) IntraMuscular once  insulin glargine Injectable (LANTUS) 36 Unit(s) SubCutaneous at bedtime  insulin lispro (ADMELOG) corrective regimen sliding scale   SubCutaneous every 6 hours  lactobacillus acidophilus 2 Tablet(s) Oral daily  linezolid    Tablet 600 milliGRAM(s) Oral every 12 hours  LORazepam     Tablet 1 milliGRAM(s) Oral every 6 hours  LORazepam     Tablet 0.5 milliGRAM(s) Oral every 2 hours PRN  methocarbamol 250 milliGRAM(s) Oral daily  pantoprazole   Suspension 40 milliGRAM(s) Enteral Tube two times a day  polyethylene glycol 3350 17 Gram(s) Oral daily  senna 2 Tablet(s) Oral at bedtime  simethicone 80 milliGRAM(s) Chew every 6 hours  sodium chloride 0.9% lock flush 10 milliLiter(s) IV Push every 1 hour PRN  sucralfate suspension 1 Gram(s) Oral four times a day      Vital Signs Last 24 Hrs  T(C): 36.8 (14 Sep 2021 08:43), Max: 37.3 (13 Sep 2021 12:00)  T(F): 98.3 (14 Sep 2021 08:43), Max: 99.1 (13 Sep 2021 12:00)  HR: 77 (14 Sep 2021 08:00) (66 - 114)  BP: 100/55 (14 Sep 2021 08:00) (81/39 - 177/92)  BP(mean): 70 (14 Sep 2021 08:00) (53 - 117)  RR: 16 (14 Sep 2021 08:00) (16 - 55)  SpO2: 100% (14 Sep 2021 08:00) (96% - 100%)      09-13-21 @ 07:01  -  09-14-21 @ 07:00  --------------------------------------------------------  IN: 2120 mL / OUT: 350 mL / NET: 1770 mL    09-14-21 @ 07:01  -  09-14-21 @ 10:26  --------------------------------------------------------  IN: 50 mL / OUT: 0 mL / NET: 50 mL        Mode: AC/ CMV (Assist Control/ Continuous Mandatory Ventilation), RR (machine): 14, TV (machine): 400, FiO2: 30, PEEP: 5, ITime: 1, MAP: 11, PIP: 22    LABS:                        6.5    5.08  )-----------( 210      ( 14 Sep 2021 06:46 )             21.8     09-14    139  |  106  |  23  ----------------------------<  188<H>  4.1   |  23  |  1.20    Ca    8.6      14 Sep 2021 06:46  Phos  2.6     09-13    TPro  6.1  /  Alb  2.1<L>  /  TBili  0.3  /  DBili  x   /  AST  25  /  ALT  30  /  AlkPhos  76  09-14    PT/INR - ( 13 Sep 2021 06:30 )   PT: 13.1 sec;   INR: 1.09 ratio                   WBC:  WBC Count: 5.08 K/uL (09-14 @ 06:46)  WBC Count: 6.38 K/uL (09-13 @ 06:30)  WBC Count: 6.84 K/uL (09-12 @ 10:12)  WBC Count: 6.08 K/uL (09-12 @ 06:48)  WBC Count: 7.52 K/uL (09-11 @ 07:30)      MICROBIOLOGY:  RECENT CULTURES:  09-08 .Sputum Sputum Serratia marcescens  Pseudomonas aeruginosa   Moderate polymorphonuclear leukocytes per low power field  Few Squamous epithelial cells per low power field  Moderate Gram Positive Rods per oil power field  Moderate Gram Negative Rods per oil power field  Few Yeast like cells per oil power field  Moderate Gram Negative Diplococci per oil power field   Numerous Serratia marcescens  Numerous Pseudomonas aeruginosa  Normal Respiratory Elvira present    09-07 .Blood Blood-Peripheral Proteus mirabilis XXXX   No Growth Final                PT/INR - ( 13 Sep 2021 06:30 )   PT: 13.1 sec;   INR: 1.09 ratio             Sodium:  Sodium, Serum: 139 mmol/L (09-14 @ 06:46)  Sodium, Serum: 140 mmol/L (09-13 @ 06:30)  Sodium, Serum: 141 mmol/L (09-12 @ 06:48)  Sodium, Serum: 141 mmol/L (09-11 @ 07:30)      1.20 mg/dL 09-14 @ 06:46  0.99 mg/dL 09-13 @ 06:30  0.96 mg/dL 09-12 @ 06:48  1.12 mg/dL 09-11 @ 07:30      Hemoglobin:  Hemoglobin: 6.5 g/dL (09-14 @ 06:46)  Hemoglobin: 7.3 g/dL (09-13 @ 06:30)  Hemoglobin: 7.2 g/dL (09-12 @ 10:12)  Hemoglobin: 7.1 g/dL (09-12 @ 06:48)  Hemoglobin: 8.3 g/dL (09-11 @ 07:30)      Platelets: Platelet Count - Automated: 210 K/uL (09-14 @ 06:46)  Platelet Count - Automated: 227 K/uL (09-13 @ 06:30)  Platelet Count - Automated: 208 K/uL (09-12 @ 10:12)  Platelet Count - Automated: 212 K/uL (09-12 @ 06:48)  Platelet Count - Automated: 247 K/uL (09-11 @ 07:30)      LIVER FUNCTIONS - ( 14 Sep 2021 06:46 )  Alb: 2.1 g/dL / Pro: 6.1 g/dL / ALK PHOS: 76 U/L / ALT: 30 U/L DA / AST: 25 U/L / GGT: x                 RADIOLOGY & ADDITIONAL STUDIES:

## 2021-09-14 NOTE — PROGRESS NOTE ADULT - ASSESSMENT
75F Chronic respiratory failure trach/vent dependent, with PEG tube, functional quadriplegia/total care patient, DM2, HTN, GERD, CVA, dementia, constipation p/w being found unresponsive at [facility], brought in by EMS for post cardiac arrest care, found to have sepsis, b/l aspiration PNA, and mild colonic ileus

## 2021-09-14 NOTE — PROGRESS NOTE ADULT - SUBJECTIVE AND OBJECTIVE BOX
Patient is a 75y old  Female who presents with a chief complaint of Unresponsive, post-cardiac arrest (14 Sep 2021 11:57)    INTERVAL HPI/OVERNIGHT EVENTS:  no new events.     MEDICATIONS  (STANDING):  albuterol/ipratropium for Nebulization 3 milliLiter(s) Nebulizer every 6 hours  aspirin  chewable 81 milliGRAM(s) Enteral Tube daily  bisacodyl Suppository 10 milliGRAM(s) Rectal at bedtime  chlorhexidine 0.12% Liquid 15 milliLiter(s) Oral Mucosa two times a day  chlorhexidine 2% Cloths 1 Application(s) Topical daily  dextrose 40% Gel 15 Gram(s) Oral once  dextrose 5%. 1000 milliLiter(s) (50 mL/Hr) IV Continuous <Continuous>  dextrose 5%. 1000 milliLiter(s) (100 mL/Hr) IV Continuous <Continuous>  dextrose 50% Injectable 25 Gram(s) IV Push once  dextrose 50% Injectable 12.5 Gram(s) IV Push once  dextrose 50% Injectable 25 Gram(s) IV Push once  glucagon  Injectable 1 milliGRAM(s) IntraMuscular once  insulin glargine Injectable (LANTUS) 36 Unit(s) SubCutaneous at bedtime  insulin lispro (ADMELOG) corrective regimen sliding scale   SubCutaneous every 6 hours  lactobacillus acidophilus 2 Tablet(s) Oral daily  linezolid    Tablet 600 milliGRAM(s) Oral every 12 hours  LORazepam     Tablet 1 milliGRAM(s) Oral every 6 hours  methocarbamol 250 milliGRAM(s) Oral daily  pantoprazole   Suspension 40 milliGRAM(s) Enteral Tube two times a day  polyethylene glycol 3350 17 Gram(s) Oral daily  senna 2 Tablet(s) Oral at bedtime  simethicone 80 milliGRAM(s) Chew every 6 hours  sucralfate suspension 1 Gram(s) Oral four times a day    MEDICATIONS  (PRN):  acetaminophen   Tablet .. 650 milliGRAM(s) Oral every 6 hours PRN Temp greater or equal to 38C (100.4F), Mild Pain (1 - 3)  LORazepam     Tablet 0.5 milliGRAM(s) Oral every 2 hours PRN Agitation  sodium chloride 0.9% lock flush 10 milliLiter(s) IV Push every 1 hour PRN Pre/post blood products, medications, blood draw, and to maintain line patency      Allergies  codeine (Hives)    REVIEW OF SYSTEMS:  unable to obtain    Vital Signs Last 24 Hrs  T(C): 36.8 (14 Sep 2021 16:24), Max: 37.2 (13 Sep 2021 21:26)  T(F): 98.3 (14 Sep 2021 16:24), Max: 98.9 (13 Sep 2021 21:26)  HR: 70 (14 Sep 2021 17:18) (70 - 114)  BP: 110/63 (14 Sep 2021 16:00) (93/45 - 177/92)  BP(mean): 79 (14 Sep 2021 16:00) (59 - 117)  RR: 14 (14 Sep 2021 16:00) (14 - 55)  SpO2: 100% (14 Sep 2021 17:18) (98% - 100%)    PHYSICAL EXAM:  GENERAL: NAD  HEAD:  Atraumatic, Normocephalic  EYES: conjunctiva and sclera clear  ENMT: trach in place  NECK: Supple, No JVD  NERVOUS SYSTEM:  opens eyes at times, still has some contortion episodes  CHEST/LUNG: Clear to auscultation bilaterally;   HEART: Regular rate and rhythm;  ABDOMEN: Soft, Nontender, Nondistended; Bowel sounds present  EXTREMITIES:   No clubbing, cyanosis, or edema      LABS:                        6.5    5.08  )-----------( 210      ( 14 Sep 2021 06:46 )             21.8     14 Sep 2021 06:46    139    |  106    |  23     ----------------------------<  188    4.1     |  23     |  1.20     Ca    8.6        14 Sep 2021 06:46    TPro  6.1    /  Alb  2.1    /  TBili  0.3    /  DBili  x      /  AST  25     /  ALT  30     /  AlkPhos  76     14 Sep 2021 06:46    PT/INR - ( 13 Sep 2021 06:30 )   PT: 13.1 sec;   INR: 1.09 ratio         CAPILLARY BLOOD GLUCOSE  POCT Blood Glucose.: 184 mg/dL (14 Sep 2021 11:35)  POCT Blood Glucose.: 186 mg/dL (14 Sep 2021 06:11)  POCT Blood Glucose.: 150 mg/dL (13 Sep 2021 23:10)  POCT Blood Glucose.: 139 mg/dL (13 Sep 2021 19:12)      RADIOLOGY & ADDITIONAL TESTS:    Imaging Personally Reviewed:  [ ] YES     Consultant(s) Notes Reviewed:      Care Discussed with Consultants/Other Providers:    Advanced Directives: [ ] DNR  [ ] No feeding tube  [ ] MOLST in chart  [ ] MOLST completed today  [ ] Unknown

## 2021-09-14 NOTE — PROGRESS NOTE ADULT - ASSESSMENT
Pt is a 74 y/o female with PMHx as above here with:    Assessment:  1. Cardiac Arrest  2. Chronic respiratory failure  3. Septic shock- off pressors  4. Aspiration pna- serratia, pseudomonas  5. Proteus UTI  6. Anemia    Plan:  - VAC 14/400/30/5, continue chronic settings, saturating 100%. Titrate as needed to maintain spo2 >92%  - Maintain MAP 65-70, TTE normal LV function  - On tube feeds, tolerating, cont bowel regimen  - Protonix  - Mild Cr elevation, start gentle IVF for 12 hrs, trend renal indices, strict I/Os, replete lytes  - S/p zosyn, remains on linezolid, last day today per ID for diabetic ulcer, trend WBC/temp curve  - CTH neg, EEG in past unconcerned for chronic BM related "seizure like movements" per neuro. Ativan PRN.   - Stat CBC, s/p 1u prbc for hgB 6.5 in AM, f/u repeat CBC. CT a/p no RP bleed. No clinical stigmata of active hemorrhage.   - Cont only asa, SCDs only, no chemical dvt ppx  - S/s, lantus, monitor f/s    Dispo: Full code.

## 2021-09-14 NOTE — PROGRESS NOTE ADULT - ASSESSMENT
74 y/o f trach dependent, with PEG tube, DM, HTN, GERD, CVA, dementia, constipation p/w being found unresponsive at [facility], brought in by EMS for post cardiac arrest care.  Noted to be febrile with leukocytosis and bandemia.    RECOMMENDATIONS    Sepsis - pt has vaughn, chronic vent support with trach, unclear localization.     rec continue ZOSYN (started 9/7) with recs to follow but high risk for PNA, sp 5 days so 9/11 as last day     diabetic ulceration distal aspect hallux Left foot/ingrown left hallux nail   pt is MRSA PCR neg, has been on Zosyn so some concerns regarding this being pressure related. Acutely developed so suggest mostly likely superficial  podiatry involved  added linezolid 600mg PO BID x 5 days  so last day 9/14 TODAY  9/13-pt with rapid shallow breathing, temp elevation but no true fever, off abx except for linezolid for toe,  9/14-continue current Rx    anemia and electrolyte management per primary [facility] physician    We will follow along in the care of this patient. Please call us at [phone] or text me directly on my cell# at [phone] with any concerns.

## 2021-09-14 NOTE — PROGRESS NOTE ADULT - SUBJECTIVE AND OBJECTIVE BOX
Patient is a 75y old  Female who presents with a chief complaint of Unresponsive, post-cardiac arrest (14 Sep 2021 12:33)      BRIEF HOSPITAL COURSE:   Pt is a 76 y/o female with PMHx of Chronic vent-dependent respiratory failure, s/p PEG, funcitonal quariplegia, dementia, HTN, DM2, prior CVA and constipation admitted after being found unresponsive, s/p cardiac arrest w/ ROSC. Found to be septic in ED likely secondary to UTI, aspiration pneumonia, shock requiring pressors. Course c/b anemia, concern for ABLA, s/p CT a/p w/o RP bleed. S/p PRBC transfusion.     Events last 24 hours: Remains largely unresponsive, on vent, HD stable, off pressors. Afebrile. HgB 6.5 this AM, s/p 1u PRBC    PAST MEDICAL & SURGICAL HISTORY:  Dementia of frontal lobe type    Aphasic stroke    Diabetes mellitus    Respiratory failure    Hypertension    GERD (gastroesophageal reflux disease)    Constipation    Respiratory failure    CVA (cerebral vascular accident)    HTN (hypertension)    DM (diabetes mellitus)    Advanced dementia    COVID-19 virus detected    Quadriplegia    Pneumonia    Type II diabetes mellitus    Hx of appendectomy    Gastrostomy in place    Tracheostomy in place    Tracheostomy tube present    Feeding by G-tube        Review of Systems:  Unable to perform minimally interactive/non verbal    Medications:  linezolid    Tablet 600 milliGRAM(s) Oral every 12 hours      albuterol/ipratropium for Nebulization 3 milliLiter(s) Nebulizer every 6 hours    acetaminophen   Tablet .. 650 milliGRAM(s) Oral every 6 hours PRN  LORazepam     Tablet 1 milliGRAM(s) Oral every 6 hours  LORazepam     Tablet 0.5 milliGRAM(s) Oral every 2 hours PRN  methocarbamol 250 milliGRAM(s) Oral daily      aspirin  chewable 81 milliGRAM(s) Enteral Tube daily    bisacodyl Suppository 10 milliGRAM(s) Rectal at bedtime  pantoprazole   Suspension 40 milliGRAM(s) Enteral Tube two times a day  polyethylene glycol 3350 17 Gram(s) Oral daily  senna 2 Tablet(s) Oral at bedtime  simethicone 80 milliGRAM(s) Chew every 6 hours  sucralfate suspension 1 Gram(s) Oral four times a day      dextrose 40% Gel 15 Gram(s) Oral once  dextrose 50% Injectable 25 Gram(s) IV Push once  dextrose 50% Injectable 12.5 Gram(s) IV Push once  dextrose 50% Injectable 25 Gram(s) IV Push once  glucagon  Injectable 1 milliGRAM(s) IntraMuscular once  insulin glargine Injectable (LANTUS) 36 Unit(s) SubCutaneous at bedtime  insulin lispro (ADMELOG) corrective regimen sliding scale   SubCutaneous every 6 hours    dextrose 5%. 1000 milliLiter(s) IV Continuous <Continuous>  dextrose 5%. 1000 milliLiter(s) IV Continuous <Continuous>  sodium chloride 0.9% lock flush 10 milliLiter(s) IV Push every 1 hour PRN      chlorhexidine 0.12% Liquid 15 milliLiter(s) Oral Mucosa two times a day  chlorhexidine 2% Cloths 1 Application(s) Topical daily    lactobacillus acidophilus 2 Tablet(s) Oral daily      Mode: AC/ CMV (Assist Control/ Continuous Mandatory Ventilation)  RR (machine): 14  TV (machine): 400  FiO2: 30  PEEP: 5  ITime: 1  MAP: 11  PIP: 28      ICU Vital Signs Last 24 Hrs  T(C): 36.8 (14 Sep 2021 16:24), Max: 37.2 (13 Sep 2021 21:26)  T(F): 98.3 (14 Sep 2021 16:24), Max: 98.9 (13 Sep 2021 21:26)  HR: 76 (14 Sep 2021 18:00) (70 - 114)  BP: 113/51 (14 Sep 2021 18:00) (93/45 - 177/92)  BP(mean): 71 (14 Sep 2021 18:00) (59 - 117)  ABP: --  ABP(mean): --  RR: 23 (14 Sep 2021 18:00) (14 - 44)  SpO2: 100% (14 Sep 2021 18:00) (98% - 100%)          I&O's Detail    13 Sep 2021 07:01  -  14 Sep 2021 07:00  --------------------------------------------------------  IN:    Enteral Tube Flush: 440 mL    Free Water: 600 mL    Jevity 1.5: 840 mL    TwoCal HN: 240 mL  Total IN: 2120 mL    OUT:    Voided (mL): 350 mL  Total OUT: 350 mL    Total NET: 1770 mL      14 Sep 2021 07:01  -  14 Sep 2021 18:59  --------------------------------------------------------  IN:    Free Water: 400 mL    Jevity 1.5: 600 mL    PRBCs (Packed Red Blood Cells): 283 mL  Total IN: 1283 mL    OUT:  Total OUT: 0 mL    Total NET: 1283 mL          LABS:                        6.5    5.08  )-----------( 210      ( 14 Sep 2021 06:46 )             21.8     09-14    139  |  106  |  23  ----------------------------<  188<H>  4.1   |  23  |  1.20    Ca    8.6      14 Sep 2021 06:46  Phos  2.6     09-13    TPro  6.1  /  Alb  2.1<L>  /  TBili  0.3  /  DBili  x   /  AST  25  /  ALT  30  /  AlkPhos  76  09-14          CAPILLARY BLOOD GLUCOSE      POCT Blood Glucose.: 130 mg/dL (14 Sep 2021 18:15)    PT/INR - ( 13 Sep 2021 06:30 )   PT: 13.1 sec;   INR: 1.09 ratio             CULTURES:  Culture Results:   Numerous Serratia marcescens  Numerous Pseudomonas aeruginosa  Normal Respiratory Elvira present (09-08 @ 13:17)      Physical Examination:    General: AD      HEENT: Pupils equal, reactive to light.  Symmetric. No scleral icterus or injection. Trach in place. No purulence/erythema.    PULM: Clear to auscultation bilaterally, no wheezes, rales or rhonchi, no significant sputum production or increased respiratory effort    NECK: Supple, no lymphadenopathy, trachea midline    CVS: Regular rate and rhythm, no murmurs, +s1/s2    ABD: Soft, nondistended, normoactive bowel sounds    EXT: No edema    SKIN: Warm and well perfused    NEURO: Lagely unresponsive, some eye opening    RADIOLOGY: < from: CT Abdomen and Pelvis No Cont (09.14.21 @ 12:31) >  IMPRESSION:  No evidence of a retroperitoneal hematoma or other acute finding abdomen and pelvis.  Additional findings as discussed    < end of copied text >  < from: Xray Femur 1 View, Left (09.13.21 @ 17:09) >    IMPRESSION: Chronic angulated fracture.    < end of copied text >  < from: Xray Chest 1 View- PORTABLE-Routine (Xray Chest 1 View- PORTABLE-Routine in AM.) (09.13.21 @ 05:50) >  IMPRESSION:  Mild atelectasis.    < end of copied text >      CRITICAL CARE TIME SPENT: ***

## 2021-09-14 NOTE — PROGRESS NOTE ADULT - ASSESSMENT
CHAPINCITO MARINA 75 f [facility] P 9/7/2021   DR ILIANA KEMP     REVIEW OF SYMPTOMS      Able to give (reliable) ROS  NO     PHYSICAL EXAM    HEENT Unremarkable  atraumatic   RESP Fair air entry EXP prolonged    Harsh breath sound Resp distres mild   CARDIAC S1 S2 No S3     NO JVD    ABDOMEN SOFT BS PRESENT NOT DISTENDED No hepatosplenomegaly   PEDAL EDEMA present No calf tenderness  NO rash         PATIENT PRESENTATION.    74 f PMH OBS cva chr vdrf peg past ho vap past ho pseudomonas sp cc admitted 9/7 sp cac asp pneu   Rxed with abio  Shock and lacticemia poa resolvd   Had sz 9/9 Noted to have anemia   Has toe lesion seen by podiatry 9/10     HOSPITAL COURSE.  SP CAC C monitor C enz Cardio on case    VENT Vent bndle  ASP PNEUM completed zosyn 5 d course   DROP IN HB 9/8/2021 Likely sec fluids gi on case no ongoing gi bl suspectd no egd plannd    SZ on anticonvulsants (9/9/2021)   TOES lesion noted 9/10/2021 seen by podiatry 9/11/2021   DVT ppplx    BEST PRACTICE ISSUES.                                                  HEAD OF BED ELEVATION. Yes  DVT PROPHYLAXIS.  lvnx 30 (9/7/2021) -> hpsc (9/7) -> dced 9/8 drop in hb        -> lvnx 30                                 SQUIRES PROPHYLAXIS.   protonix 40 (9/7/2021)       carafate 1.4 (9/7/2021)                                                                                   DIET.     npo 9/7/2021  -> glucerna 1.5 1200 (9/7)   -> 2cal 800 (9/9)                   INFECTION PROPHYLAXIS.           chlorhexidine 2% (9/8)            chlorhexidine .12 9/9/2021     GENERAL ISSUES  GOC ADVANCED DIRECTIVE.                                         ALLERGY.                            WEIGHT.         9/7/2021 130                           BMI.                   9/7/2021  22    PATIENT DATA   TUBES  PROCEDURES.      9/7/2021 Attempted by er md andrews and r subclav unsuccessful   9/7/2021 IO   9/7/2021 vaughn  9/8/2021 C line darryl  ccpa   9/9/2021 vaughn dced     PEG poa  TRACH poa     ABG.     VITALS/IO/VENT/DRIPS.   9/14/2021 74 110/50   9/14/2021 ac 14/400/5/.3     PROBLEM ASSESSMENT/RECOMMENDATIONS.    COVID PROFILE.  No current covid infection    SP CAC AT [facility] FEW MIN 9/7/2021.   there was low grade troponin elevation on admission which down trended  and was likely demand ischemia   Opens eyes     POSSIBLE ASP PNEUMONIA poa.  9/7 ct basal as neum distended esophagus   w 9/11-9/13/2021 w 7.5 - 6.3  pr 9/11/2021 pr 2.3   mrsa 9/8/2021 (-)   blod c 9/7 (-)   legn 9/7 (-)   urine 9/7 proteus   sputum 9/8 serratia pseudomonas   linezolid 9/10/2021 x 5d (Dr Mcpherson) (paronychia)     TRACH POA.     VENT DEPENENT POA.   Target pH 730 (+) PO2 60 (+) po 90-95% Pplat 35 (-)   HOBE DSV DSBT  9/8/2021 No abg available resp could not get abg     COPD.  duoneb (9/7/2021)        RO MI.   Tr 9/7-9/8/2021 tr .17- .091   ASA 81 (9/7/2021)   metoprolol 25.2 (9/7/2021)     RO CHF.   bnp 9/7/2021 bnp 2391   echo 6/15/2021 n lvsf dd1     DROP IN HB 9/14/2021.  9/13-9/14 Hb 7.3-6.5  ctap 9/14/2021 (-)     TRANSFUSION  9/14/2021 1 u prbc     PEG POA.    GERD.  9/7/2021 ct cap gerd   protonix 40  9/7/2021)                                             ELEVATED LFTS POA.  LFTS 9/7/2021  ap 122  ast 205  alt 81                                      COLON ILEUS ON CTAP 9/7/2021.   bisacodyl 10 (9/7/2021)   miralax (9/7/2021)   tolerating tf    RYAN poa.  Cr 9/7-9/8-9/9-9/10-9/12/2021 Cr 2.5- 1.6- 1.2 - 1 - .9    DM.   insulin     PMH APHASIC STROKE.   AC R SPHENOID SINUSITIS 9/7/2021 CT.   ct head 9/7/2021 No ac poss ac r sphenoid sinusitis    PAIN.   fentnyl 12 (9/7/2021)    ANXIETY.   lorazepam 1.3 (9/7/2021)     SEIZURES 9/9/2021   Given lorazepam 4 on 9/9/2021   Neuro called 9/9/2021   methocarbamol 250 started by neuro 9/11/2021     TOES LESION NOTED 9/10/2021   9/11/2021 Podiatry saw         OVERALL PLAN.  If Hb remains stable dc planning         TIME SPENT   Over 36 minutes aggregate critical care time spent on encounter; activities included   direct patient care, counseling and/or coordinating care reviewing notes, lab data/ imaging , discussion with multidisciplinary team/ patient  /family and explaining in detail risks, benefits, alternatives  of the recommendations     CHAPINCITO GUIDRY 75 f [place] P 9/7/2021   DR ILIANA KEMP

## 2021-09-14 NOTE — PROGRESS NOTE ADULT - SUBJECTIVE AND OBJECTIVE BOX
[facility] DIVISION of INFECTIOUS DISEASE  Arturo Olivas MD PhD, Haylee Umanzor MD, Andressa Brantley MD, Billy Valenzuela MD  and providing coverage with Alexa Canas MD and Eusebio Chairez MD  Providing Infectious Disease Consultations at [facility], [facility], [facility]'s    Office# [phone] to schedule follow up appointments  Answering Service for urgent calls or New Consults [phone]  Cell# to text for urgent issues Arturo Olivas [phone]     infectious diseases progress note:    BREA BECKHAM is a 75y y. o. Female patient    No concerning overnight events    Allergies    codeine (Hives)    Intolerances        ANTIBIOTICS/RELEVANT:  antimicrobials  linezolid    Tablet 600 milliGRAM(s) Oral every 12 hours    immunologic:    OTHER:  acetaminophen   Tablet .. 650 milliGRAM(s) Oral every 6 hours PRN  albuterol/ipratropium for Nebulization 3 milliLiter(s) Nebulizer every 6 hours  aspirin  chewable 81 milliGRAM(s) Enteral Tube daily  bisacodyl Suppository 10 milliGRAM(s) Rectal at bedtime  chlorhexidine 0.12% Liquid 15 milliLiter(s) Oral Mucosa two times a day  chlorhexidine 2% Cloths 1 Application(s) Topical daily  dextrose 40% Gel 15 Gram(s) Oral once  dextrose 5%. 1000 milliLiter(s) IV Continuous <Continuous>  dextrose 5%. 1000 milliLiter(s) IV Continuous <Continuous>  dextrose 50% Injectable 25 Gram(s) IV Push once  dextrose 50% Injectable 12.5 Gram(s) IV Push once  dextrose 50% Injectable 25 Gram(s) IV Push once  enoxaparin Injectable 30 milliGRAM(s) SubCutaneous daily  glucagon  Injectable 1 milliGRAM(s) IntraMuscular once  insulin glargine Injectable (LANTUS) 36 Unit(s) SubCutaneous at bedtime  insulin lispro (ADMELOG) corrective regimen sliding scale   SubCutaneous every 6 hours  lactobacillus acidophilus 2 Tablet(s) Oral daily  LORazepam     Tablet 1 milliGRAM(s) Oral every 6 hours  LORazepam     Tablet 0.5 milliGRAM(s) Oral every 2 hours PRN  methocarbamol 250 milliGRAM(s) Oral daily  pantoprazole   Suspension 40 milliGRAM(s) Enteral Tube two times a day  polyethylene glycol 3350 17 Gram(s) Oral daily  senna 2 Tablet(s) Oral at bedtime  simethicone 80 milliGRAM(s) Chew every 6 hours  sodium chloride 0.9% lock flush 10 milliLiter(s) IV Push every 1 hour PRN  sucralfate suspension 1 Gram(s) Oral four times a day      Objective:  Vital Signs Last 24 Hrs  T(C): 36.8 (14 Sep 2021 08:43), Max: 37.3 (13 Sep 2021 12:00)  T(F): 98.3 (14 Sep 2021 08:43), Max: 99.1 (13 Sep 2021 12:00)  HR: 77 (14 Sep 2021 08:00) (66 - 131)  BP: 100/55 (14 Sep 2021 08:00) (81/39 - 178/136)  BP(mean): 70 (14 Sep 2021 08:00) (53 - 147)  RR: 16 (14 Sep 2021 08:00) (16 - 55)  SpO2: 100% (14 Sep 2021 08:00) (96% - 100%)    T(C): 36.8 (09-14-21 @ 08:43), Max: 37.8 (09-13-21 @ 08:30)  T(C): 36.8 (09-14-21 @ 08:43), Max: 37.8 (09-13-21 @ 08:30)  T(C): 36.8 (09-14-21 @ 08:43), Max: 37.8 (09-13-21 @ 08:30)    PHYSICAL EXAM:  HEENT: NC atraumatic  Neck: supple, intubated via trach  Respiratory: no accessory muscle use, breathing comfortably  Cardiovascular: distant  Gastrointestinal: normal appearing, nondistended  Extremities: no clubbing, no cyanosis, lesion on large toe    Mode: AC/ CMV (Assist Control/ Continuous Mandatory Ventilation), RR (machine): 14, TV (machine): 400, FiO2: 30, PEEP: 5, ITime: 1, MAP: 11, PIP: 22    LABS:                          6.5    5.08  )-----------( 210      ( 14 Sep 2021 06:46 )             21.8       5.08 09-14 @ 06:46  6.38 09-13 @ 06:30  6.84 09-12 @ 10:12  6.08 09-12 @ 06:48  7.52 09-11 @ 07:30  5.50 09-10 @ 08:17  7.69 09-09 @ 15:06  4.84 09-09 @ 07:04  7.65 09-08 @ 09:03  8.44 09-08 @ 06:55  26.45 09-07 @ 10:38      09-14    139  |  106  |  23  ----------------------------<  188<H>  4.1   |  23  |  1.20    Ca    8.6      14 Sep 2021 06:46  Phos  2.6     09-13    TPro  6.1  /  Alb  2.1<L>  /  TBili  0.3  /  DBili  x   /  AST  25  /  ALT  30  /  AlkPhos  76  09-14      Creatinine, Serum: 1.20 mg/dL (09-14-21 @ 06:46)  Creatinine, Serum: 0.99 mg/dL (09-13-21 @ 06:30)  Creatinine, Serum: 0.96 mg/dL (09-12-21 @ 06:48)  Creatinine, Serum: 1.12 mg/dL (09-11-21 @ 07:30)  Creatinine, Serum: 1.08 mg/dL (09-10-21 @ 08:17)  Creatinine, Serum: 1.24 mg/dL (09-09-21 @ 07:04)  Creatinine, Serum: 1.61 mg/dL (09-08-21 @ 06:55)  Creatinine, Serum: 2.58 mg/dL (09-07-21 @ 10:56)      PT/INR - ( 13 Sep 2021 06:30 )   PT: 13.1 sec;   INR: 1.09 ratio                   INFLAMMATORY MARKERS  Auto Neutrophil #: 2.92 K/uL (09-14-21 @ 06:46)  Auto Lymphocyte #: 1.43 K/uL (09-14-21 @ 06:46)  Auto Neutrophil #: 22.22 K/uL (09-07-21 @ 10:38)  Auto Lymphocyte #: 2.65 K/uL (09-07-21 @ 10:38)    Lactate, Blood: 1.0 mmol/L (09-08-21 @ 06:55)  Lactate, Blood: 1.1 mmol/L (09-07-21 @ 20:59)  Lactate, Blood: 3.7 mmol/L (09-07-21 @ 14:27)  Lactate, Blood: 13.5 mmol/L (09-07-21 @ 10:38)    Auto Eosinophil #: 0.09 K/uL (09-14-21 @ 06:46)  Auto Eosinophil #: 0.00 K/uL (09-07-21 @ 10:38)        Procalcitonin, Serum: 0.90 ng/mL (09-13-21 @ 13:45)  Procalcitonin, Serum: 2.13 ng/mL (09-11-21 @ 11:44)  Procalcitonin, Serum: 7.69 ng/mL (09-08-21 @ 13:26)    Troponin I, Serum: .035 ng/mL (09-09-21 @ 07:04)  Troponin I, Serum: .091 ng/mL (09-08-21 @ 06:55)  Troponin I, Serum: .123 ng/mL (09-07-21 @ 20:59)  Troponin I, Serum: .127 ng/mL (09-07-21 @ 10:38)          Ferritin, Serum: 152 ng/mL (09-09-21 @ 18:50)        INR: 1.09 ratio (09-13-21 @ 06:30)  INR: 1.05 ratio (09-11-21 @ 07:30)  INR: 1.03 ratio (09-09-21 @ 07:04)  INR: 1.03 ratio (09-08-21 @ 06:55)  Activated Partial Thromboplastin Time: 41.4 sec (09-07-21 @ 10:38)  INR: 1.03 ratio (09-07-21 @ 10:38)      MICROBIOLOGY:        RADIOLOGY & ADDITIONAL STUDIES:    < from: Xray Chest 1 View- PORTABLE-Routine (Xray Chest 1 View- PORTABLE-Routine in AM.) (09.09.21 @ 08:21) >    EXAM:  XR CHEST PORTABLE ROUTINE 1V                                  PROCEDURE DATE:  09/09/2021          INTERPRETATION:  Chest one view    HISTORY: Pneumonia    COMPARISON STUDY: 9/7/2021    Frontal expiratory view of the chest shows the heart angel normal in size. Tracheostomy tube and left jugular line are again noted.    The lungs show questionable right base infiltrate and there is no evidence of pneumothorax nor pleural effusion.    IMPRESSION:  Questionable right infiltrate. Follow-up study is recommended as clinically warranted.

## 2021-09-14 NOTE — PROGRESS NOTE ADULT - ASSESSMENT
76 yo f pmhx dementia, CVA, VDRF s/p trach/peg, quadriplegia, DM, HTN, GERD, constipation admitted with   s/p card arrest - sepsis - shock - acute infection - dementia - chr resp failure    remains on Zyvox    covid pcr repeat NEG on 9 12 21    Podiatry cx noted - f/u reviewed -   Robaxin added -     neuro notes reviewed - vs noted -     ID f/u noted  ABX rx regimen in progress -     GOC discussion  pt is full code   is the HCP  on TF  on NEBS  on ABX rx regimen  assist with needs - ADL  HOB elev - orgal hygiene - skin care  prognosis guarded to poor

## 2021-09-14 NOTE — PROGRESS NOTE ADULT - SUBJECTIVE AND OBJECTIVE BOX
Chief Complaint: Cardiac arrest    Interval Events: No events overnight.    Review of Systems:  Unable to obtain    Physical Exam:  Vital Signs Last 24 Hrs  T(C): 36.8 (14 Sep 2021 08:43), Max: 37.2 (13 Sep 2021 21:26)  T(F): 98.3 (14 Sep 2021 08:43), Max: 98.9 (13 Sep 2021 21:26)  HR: 76 (14 Sep 2021 12:00) (66 - 114)  BP: 97/51 (14 Sep 2021 12:00) (81/39 - 177/92)  BP(mean): 66 (14 Sep 2021 12:00) (53 - 117)  RR: 14 (14 Sep 2021 12:00) (14 - 55)  SpO2: 100% (14 Sep 2021 12:00) (98% - 100%)  General: Chronically ill appearing  HEENT: Trach  Neck: No JVD, no carotid bruit  Lungs: CTAB  CV: RRR, nl S1/S2, no M/R/G  Abdomen: S/NT/ND, +BS  Extremities: No LE edema, no cyanosis  Neuro: AAOx0, contracted  Skin: No rash    Labs:             09-14    139  |  106  |  23  ----------------------------<  188<H>  4.1   |  23  |  1.20    Ca    8.6      14 Sep 2021 06:46  Phos  2.6     09-13    TPro  6.1  /  Alb  2.1<L>  /  TBili  0.3  /  DBili  x   /  AST  25  /  ALT  30  /  AlkPhos  76  09-14                        6.5    5.08  )-----------( 210      ( 14 Sep 2021 06:46 )             21.8       Telemetry: Sinus rhythm

## 2021-09-14 NOTE — PROGRESS NOTE ADULT - SUBJECTIVE AND OBJECTIVE BOX
Neurology follow up note    BREA [id number]      Interval History:    Patient resting in bed     Allergies    codeine (Hives)    Intolerances        MEDICATIONS    acetaminophen   Tablet .. 650 milliGRAM(s) Oral every 6 hours PRN  albuterol/ipratropium for Nebulization 3 milliLiter(s) Nebulizer every 6 hours  aspirin  chewable 81 milliGRAM(s) Enteral Tube daily  bisacodyl Suppository 10 milliGRAM(s) Rectal at bedtime  chlorhexidine 0.12% Liquid 15 milliLiter(s) Oral Mucosa two times a day  chlorhexidine 2% Cloths 1 Application(s) Topical daily  dextrose 40% Gel 15 Gram(s) Oral once  dextrose 5%. 1000 milliLiter(s) IV Continuous <Continuous>  dextrose 5%. 1000 milliLiter(s) IV Continuous <Continuous>  dextrose 50% Injectable 25 Gram(s) IV Push once  dextrose 50% Injectable 12.5 Gram(s) IV Push once  dextrose 50% Injectable 25 Gram(s) IV Push once  glucagon  Injectable 1 milliGRAM(s) IntraMuscular once  insulin glargine Injectable (LANTUS) 36 Unit(s) SubCutaneous at bedtime  insulin lispro (ADMELOG) corrective regimen sliding scale   SubCutaneous every 6 hours  lactobacillus acidophilus 2 Tablet(s) Oral daily  linezolid    Tablet 600 milliGRAM(s) Oral every 12 hours  LORazepam     Tablet 1 milliGRAM(s) Oral every 6 hours  LORazepam     Tablet 0.5 milliGRAM(s) Oral every 2 hours PRN  methocarbamol 250 milliGRAM(s) Oral daily  pantoprazole   Suspension 40 milliGRAM(s) Enteral Tube two times a day  polyethylene glycol 3350 17 Gram(s) Oral daily  senna 2 Tablet(s) Oral at bedtime  simethicone 80 milliGRAM(s) Chew every 6 hours  sodium chloride 0.9% lock flush 10 milliLiter(s) IV Push every 1 hour PRN  sucralfate suspension 1 Gram(s) Oral four times a day              Vital Signs Last 24 Hrs  T(C): 36.8 (14 Sep 2021 08:43), Max: 37.3 (13 Sep 2021 12:00)  T(F): 98.3 (14 Sep 2021 08:43), Max: 99.1 (13 Sep 2021 12:00)  HR: 74 (14 Sep 2021 10:35) (66 - 114)  BP: 106/50 (14 Sep 2021 10:00) (81/39 - 177/92)  BP(mean): 66 (14 Sep 2021 10:00) (53 - 117)  RR: 16 (14 Sep 2021 10:00) (16 - 55)  SpO2: 100% (14 Sep 2021 10:35) (98% - 100%)      REVIEW OF SYSTEMS:  Could not be obtained secondary to the patient being nonverbal.  As per my conversation with the spouse, a gradual process along with underlying strokes causing her to become bed-bound.    PHYSICAL EXAMINATION:    Head:  Normocephalic, atraumatic.  Eyes:  No scleral icterus.  Ears:  Cannot be evaluated secondary to the patient being nonverbal.  NECK:  Tracheostomy was in place.  RESPIRATORY:  Good air entry bilaterally.  CARDIOVASCULAR:  S1 and S2 heard.  ABDOMEN:  Soft and nontender.  EXTREMITIES:  No clubbing or cyanosis were noted.      NEUROLOGIC:  The patient was awake in bed.  At present no movement.  does have a history upon stimulating the patient, her eyes did roll up.  Her body started to stiffen with abnormal mouth movements, lasted one to two minutes history of this  Pupils bilaterally were 3 mm, reactive to 2 mm.  The patient has her arms in a flexed position with both hands contracted.  Bilateral lower extremities were in a straight position.  The patient has increased tone, bilateral upper and lower extremities.                 LABS:  CBC Full  -  ( 14 Sep 2021 06:46 )  WBC Count : 5.08 K/uL  RBC Count : 2.42 M/uL  Hemoglobin : 6.5 g/dL  Hematocrit : 21.8 %  Platelet Count - Automated : 210 K/uL  Mean Cell Volume : 90.1 fl  Mean Cell Hemoglobin : 26.9 pg  Mean Cell Hemoglobin Concentration : 29.8 gm/dL  Auto Neutrophil # : 2.92 K/uL  Auto Lymphocyte # : 1.43 K/uL  Auto Monocyte # : 0.61 K/uL  Auto Eosinophil # : 0.09 K/uL  Auto Basophil # : 0.01 K/uL  Auto Neutrophil % : 57.5 %  Auto Lymphocyte % : 28.1 %  Auto Monocyte % : 12.0 %  Auto Eosinophil % : 1.8 %  Auto Basophil % : 0.2 %      09-14    139  |  106  |  23  ----------------------------<  188<H>  4.1   |  23  |  1.20    Ca    8.6      14 Sep 2021 06:46  Phos  2.6     09-13    TPro  6.1  /  Alb  2.1<L>  /  TBili  0.3  /  DBili  x   /  AST  25  /  ALT  30  /  AlkPhos  76  09-14    Hemoglobin A1C:     LIVER FUNCTIONS - ( 14 Sep 2021 06:46 )  Alb: 2.1 g/dL / Pro: 6.1 g/dL / ALK PHOS: 76 U/L / ALT: 30 U/L DA / AST: 25 U/L / GGT: x           Vitamin B12   PT/INR - ( 13 Sep 2021 06:30 )   PT: 13.1 sec;   INR: 1.09 ratio               RADIOLOGY      ANALYSIS AND PLAN:  This is a 75-year-old with an episode of cardiac arrest and abnormal movements.  For episodes of cardiac arrest, continue to monitor heart rate on telemetry.  If possible, please maintain a systolic blood pressure greater than 100, to help maintain cerebral perfusion.  For history of abnormal movements, as per my conversation with the spouse, this is a known condition that happens to her, as per him with GI-related issues.  She will start to hyperventilate, eyes will roll up, will have abnormal mouth movement, and tried to move from side-to-side.  She has undergone extensive EEG monitoring and has captured these events on numerous occasions and deemed to be non-epileptiform.  For now, I would recommend supportive therapy.  No antiepileptic medications.  For history of diabetes, strict control of blood sugars.  For history of cerebrovascular accident, continue the patient on her home medications.  spoke to spouse agreeable to try muscle relaxant     The spouse's name is Marciano.  His telephone number is [phone] 9/13.  I had a lengthy discussion, we will not be starting any antiepileptic medications.    Greater than 40 minutes of time was spent with the patient, plan of care, reviewing data, speaking to the multidisciplinary healthcare team with greater than 50% of that time in counseling and care coordination.        Neurology follow up note    BREA [id number]      Interval History:    Patient resting in bed     Allergies    codeine (Hives)    Intolerances        MEDICATIONS    acetaminophen   Tablet .. 650 milliGRAM(s) Oral every 6 hours PRN  albuterol/ipratropium for Nebulization 3 milliLiter(s) Nebulizer every 6 hours  aspirin  chewable 81 milliGRAM(s) Enteral Tube daily  bisacodyl Suppository 10 milliGRAM(s) Rectal at bedtime  chlorhexidine 0.12% Liquid 15 milliLiter(s) Oral Mucosa two times a day  chlorhexidine 2% Cloths 1 Application(s) Topical daily  dextrose 40% Gel 15 Gram(s) Oral once  dextrose 5%. 1000 milliLiter(s) IV Continuous <Continuous>  dextrose 5%. 1000 milliLiter(s) IV Continuous <Continuous>  dextrose 50% Injectable 25 Gram(s) IV Push once  dextrose 50% Injectable 12.5 Gram(s) IV Push once  dextrose 50% Injectable 25 Gram(s) IV Push once  glucagon  Injectable 1 milliGRAM(s) IntraMuscular once  insulin glargine Injectable (LANTUS) 36 Unit(s) SubCutaneous at bedtime  insulin lispro (ADMELOG) corrective regimen sliding scale   SubCutaneous every 6 hours  lactobacillus acidophilus 2 Tablet(s) Oral daily  linezolid    Tablet 600 milliGRAM(s) Oral every 12 hours  LORazepam     Tablet 1 milliGRAM(s) Oral every 6 hours  LORazepam     Tablet 0.5 milliGRAM(s) Oral every 2 hours PRN  methocarbamol 250 milliGRAM(s) Oral daily  pantoprazole   Suspension 40 milliGRAM(s) Enteral Tube two times a day  polyethylene glycol 3350 17 Gram(s) Oral daily  senna 2 Tablet(s) Oral at bedtime  simethicone 80 milliGRAM(s) Chew every 6 hours  sodium chloride 0.9% lock flush 10 milliLiter(s) IV Push every 1 hour PRN  sucralfate suspension 1 Gram(s) Oral four times a day              Vital Signs Last 24 Hrs  T(C): 36.8 (14 Sep 2021 08:43), Max: 37.3 (13 Sep 2021 12:00)  T(F): 98.3 (14 Sep 2021 08:43), Max: 99.1 (13 Sep 2021 12:00)  HR: 74 (14 Sep 2021 10:35) (66 - 114)  BP: 106/50 (14 Sep 2021 10:00) (81/39 - 177/92)  BP(mean): 66 (14 Sep 2021 10:00) (53 - 117)  RR: 16 (14 Sep 2021 10:00) (16 - 55)  SpO2: 100% (14 Sep 2021 10:35) (98% - 100%)      REVIEW OF SYSTEMS:  Could not be obtained secondary to the patient being nonverbal.  As per my conversation with the spouse, a gradual process along with underlying strokes causing her to become bed-bound.    PHYSICAL EXAMINATION:    Head:  Normocephalic, atraumatic.  Eyes:  No scleral icterus.  Ears:  Cannot be evaluated secondary to the patient being nonverbal.  NECK:  Tracheostomy was in place.  RESPIRATORY:  Good air entry bilaterally.  CARDIOVASCULAR:  S1 and S2 heard.  ABDOMEN:  Soft and nontender.  EXTREMITIES:  No clubbing or cyanosis were noted.      NEUROLOGIC:  The patient was awake in bed.  At present no movement.  does have a history upon stimulating the patient, her eyes did roll up.  Her body started to stiffen with abnormal mouth movements, lasted one to two minutes history of this  Pupils bilaterally were 3 mm, reactive to 2 mm.  The patient has her arms in a flexed position with both hands contracted.  Bilateral lower extremities were in a straight position.  The patient has increased tone, bilateral upper and lower extremities.                 LABS:  CBC Full  -  ( 14 Sep 2021 06:46 )  WBC Count : 5.08 K/uL  RBC Count : 2.42 M/uL  Hemoglobin : 6.5 g/dL  Hematocrit : 21.8 %  Platelet Count - Automated : 210 K/uL  Mean Cell Volume : 90.1 fl  Mean Cell Hemoglobin : 26.9 pg  Mean Cell Hemoglobin Concentration : 29.8 gm/dL  Auto Neutrophil # : 2.92 K/uL  Auto Lymphocyte # : 1.43 K/uL  Auto Monocyte # : 0.61 K/uL  Auto Eosinophil # : 0.09 K/uL  Auto Basophil # : 0.01 K/uL  Auto Neutrophil % : 57.5 %  Auto Lymphocyte % : 28.1 %  Auto Monocyte % : 12.0 %  Auto Eosinophil % : 1.8 %  Auto Basophil % : 0.2 %      09-14    139  |  106  |  23  ----------------------------<  188<H>  4.1   |  23  |  1.20    Ca    8.6      14 Sep 2021 06:46  Phos  2.6     09-13    TPro  6.1  /  Alb  2.1<L>  /  TBili  0.3  /  DBili  x   /  AST  25  /  ALT  30  /  AlkPhos  76  09-14    Hemoglobin A1C:     LIVER FUNCTIONS - ( 14 Sep 2021 06:46 )  Alb: 2.1 g/dL / Pro: 6.1 g/dL / ALK PHOS: 76 U/L / ALT: 30 U/L DA / AST: 25 U/L / GGT: x           Vitamin B12   PT/INR - ( 13 Sep 2021 06:30 )   PT: 13.1 sec;   INR: 1.09 ratio               RADIOLOGY      ANALYSIS AND PLAN:  This is a 75-year-old with an episode of cardiac arrest and abnormal movements.  For episodes of cardiac arrest, continue to monitor heart rate on telemetry.  If possible, please maintain a systolic blood pressure greater than 100, to help maintain cerebral perfusion.  For history of abnormal movements, as per my conversation with the spouse, this is a known condition that happens to her, as per him with GI-related issues.  She will start to hyperventilate, eyes will roll up, will have abnormal mouth movement, and tried to move from side-to-side.  She has undergone extensive EEG monitoring and has captured these events on numerous occasions and deemed to be non-epileptiform.  For now, I would recommend supportive therapy.  No antiepileptic medications.  For history of diabetes, strict control of blood sugars.  For history of cerebrovascular accident, continue the patient on her home medications.  spoke to spouse agreeable to try muscle relaxant     The spouse's name is Marciano.  His telephone number is [phone] 9/14.  I had a lengthy discussion, we will not be starting any antiepileptic medications.    Greater than 40 minutes of time was spent with the patient, plan of care, reviewing data, speaking to the multidisciplinary healthcare team with greater than 50% of that time in counseling and care coordination.

## 2021-09-14 NOTE — PROGRESS NOTE ADULT - SUBJECTIVE AND OBJECTIVE BOX
PROGRESS NOTE   Patient is a 75y old  Female who presents with a chief complaint of Unresponsive, post-cardiac arrest (14 Sep 2021 09:12) Pt seen at bedside non responsive      HPI:  76 y/o f trach dependent, with PEG tube, DM, HTN, GERD, CVA, dementia, constipation p/w being found unresponsive at [facility], brought in by EMS for post cardiac arrest care.       In ED, pt was given IVF NS bolus, was foudn to be hypotensive, and started on levophed for pressor support, and CT showed signs of aspiration PNA, an dpt was started on IV zosyn.     (07 Sep 2021 12:19)      Vital Signs Last 24 Hrs  T(C): 36.8 (14 Sep 2021 08:43), Max: 37.3 (13 Sep 2021 12:00)  T(F): 98.3 (14 Sep 2021 08:43), Max: 99.1 (13 Sep 2021 12:00)  HR: 77 (14 Sep 2021 08:00) (66 - 131)  BP: 100/55 (14 Sep 2021 08:00) (81/39 - 178/136)  BP(mean): 70 (14 Sep 2021 08:00) (53 - 147)  RR: 16 (14 Sep 2021 08:00) (16 - 55)  SpO2: 100% (14 Sep 2021 08:00) (96% - 100%)                          6.5    5.08  )-----------( 210      ( 14 Sep 2021 06:46 )             21.8               09-14    139  |  106  |  23  ----------------------------<  188<H>  4.1   |  23  |  1.20    Ca    8.6      14 Sep 2021 06:46  Phos  2.6     09-13    TPro  6.1  /  Alb  2.1<L>  /  TBili  0.3  /  DBili  x   /  AST  25  /  ALT  30  /  AlkPhos  76  09-14      PHYSICAL EXAM  GEN: BREA BECKHAM is a  75y Female in no acute distress.   LE Focused:   noted are bilateral ulceration about the distal aspects of the hallux bilaterally. There are positive eschar formations noted hallux distal aspect bilaterally. These lesions appear to be pressure related. There is an ingrown right hallux nail with positive localized erythema, edema and calor. There is negative drainage and negative cellulitis at this time.

## 2021-09-15 ENCOUNTER — TRANSCRIPTION ENCOUNTER (OUTPATIENT)
Age: 76
End: 2021-09-15

## 2021-09-15 VITALS
OXYGEN SATURATION: 99 % | DIASTOLIC BLOOD PRESSURE: 46 MMHG | HEART RATE: 108 BPM | RESPIRATION RATE: 23 BRPM | SYSTOLIC BLOOD PRESSURE: 110 MMHG

## 2021-09-15 LAB
ALBUMIN SERPL ELPH-MCNC: 1.7 G/DL — LOW (ref 3.3–5)
ALP SERPL-CCNC: 55 U/L — SIGNIFICANT CHANGE UP (ref 30–120)
ALT FLD-CCNC: 23 U/L DA — SIGNIFICANT CHANGE UP (ref 10–60)
ANION GAP SERPL CALC-SCNC: 11 MMOL/L — SIGNIFICANT CHANGE UP (ref 5–17)
AST SERPL-CCNC: 21 U/L — SIGNIFICANT CHANGE UP (ref 10–40)
BILIRUB SERPL-MCNC: 0.2 MG/DL — SIGNIFICANT CHANGE UP (ref 0.2–1.2)
BUN SERPL-MCNC: 21 MG/DL — SIGNIFICANT CHANGE UP (ref 7–23)
CALCIUM SERPL-MCNC: 7 MG/DL — LOW (ref 8.4–10.5)
CHLORIDE SERPL-SCNC: 111 MMOL/L — HIGH (ref 96–108)
CO2 SERPL-SCNC: 19 MMOL/L — LOW (ref 22–31)
CREAT SERPL-MCNC: 1 MG/DL — SIGNIFICANT CHANGE UP (ref 0.5–1.3)
GLUCOSE BLDC GLUCOMTR-MCNC: 153 MG/DL — HIGH (ref 70–99)
GLUCOSE BLDC GLUCOMTR-MCNC: 156 MG/DL — HIGH (ref 70–99)
GLUCOSE BLDC GLUCOMTR-MCNC: 164 MG/DL — HIGH (ref 70–99)
GLUCOSE SERPL-MCNC: 130 MG/DL — HIGH (ref 70–99)
HCT VFR BLD CALC: 26.8 % — LOW (ref 34.5–45)
HCT VFR BLD CALC: 30.7 % — LOW (ref 34.5–45)
HGB BLD-MCNC: 8.2 G/DL — LOW (ref 11.5–15.5)
HGB BLD-MCNC: 9.3 G/DL — LOW (ref 11.5–15.5)
MCHC RBC-ENTMCNC: 27.4 PG — SIGNIFICANT CHANGE UP (ref 27–34)
MCHC RBC-ENTMCNC: 27.4 PG — SIGNIFICANT CHANGE UP (ref 27–34)
MCHC RBC-ENTMCNC: 30.3 GM/DL — LOW (ref 32–36)
MCHC RBC-ENTMCNC: 30.6 GM/DL — LOW (ref 32–36)
MCV RBC AUTO: 89.6 FL — SIGNIFICANT CHANGE UP (ref 80–100)
MCV RBC AUTO: 90.3 FL — SIGNIFICANT CHANGE UP (ref 80–100)
NRBC # BLD: 0 /100 WBCS — SIGNIFICANT CHANGE UP (ref 0–0)
NRBC # BLD: 0 /100 WBCS — SIGNIFICANT CHANGE UP (ref 0–0)
PLATELET # BLD AUTO: 190 K/UL — SIGNIFICANT CHANGE UP (ref 150–400)
PLATELET # BLD AUTO: 217 K/UL — SIGNIFICANT CHANGE UP (ref 150–400)
POTASSIUM SERPL-MCNC: 3.7 MMOL/L — SIGNIFICANT CHANGE UP (ref 3.5–5.3)
POTASSIUM SERPL-SCNC: 3.7 MMOL/L — SIGNIFICANT CHANGE UP (ref 3.5–5.3)
PROT SERPL-MCNC: 5.1 G/DL — LOW (ref 6–8.3)
RBC # BLD: 2.99 M/UL — LOW (ref 3.8–5.2)
RBC # BLD: 3.4 M/UL — LOW (ref 3.8–5.2)
RBC # FLD: 18.2 % — HIGH (ref 10.3–14.5)
RBC # FLD: 18.5 % — HIGH (ref 10.3–14.5)
SARS-COV-2 RNA SPEC QL NAA+PROBE: SIGNIFICANT CHANGE UP
SODIUM SERPL-SCNC: 141 MMOL/L — SIGNIFICANT CHANGE UP (ref 135–145)
WBC # BLD: 6.61 K/UL — SIGNIFICANT CHANGE UP (ref 3.8–10.5)
WBC # BLD: 6.81 K/UL — SIGNIFICANT CHANGE UP (ref 3.8–10.5)
WBC # FLD AUTO: 6.61 K/UL — SIGNIFICANT CHANGE UP (ref 3.8–10.5)
WBC # FLD AUTO: 6.81 K/UL — SIGNIFICANT CHANGE UP (ref 3.8–10.5)

## 2021-09-15 PROCEDURE — 87186 SC STD MICRODIL/AGAR DIL: CPT

## 2021-09-15 PROCEDURE — 83735 ASSAY OF MAGNESIUM: CPT

## 2021-09-15 PROCEDURE — 73630 X-RAY EXAM OF FOOT: CPT

## 2021-09-15 PROCEDURE — 96367 TX/PROPH/DG ADDL SEQ IV INF: CPT

## 2021-09-15 PROCEDURE — 99291 CRITICAL CARE FIRST HOUR: CPT | Mod: 25

## 2021-09-15 PROCEDURE — 87040 BLOOD CULTURE FOR BACTERIA: CPT

## 2021-09-15 PROCEDURE — 84100 ASSAY OF PHOSPHORUS: CPT

## 2021-09-15 PROCEDURE — 93306 TTE W/DOPPLER COMPLETE: CPT

## 2021-09-15 PROCEDURE — 86850 RBC ANTIBODY SCREEN: CPT

## 2021-09-15 PROCEDURE — 74176 CT ABD & PELVIS W/O CONTRAST: CPT

## 2021-09-15 PROCEDURE — 94760 N-INVAS EAR/PLS OXIMETRY 1: CPT

## 2021-09-15 PROCEDURE — 86901 BLOOD TYPING SEROLOGIC RH(D): CPT

## 2021-09-15 PROCEDURE — 82803 BLOOD GASES ANY COMBINATION: CPT

## 2021-09-15 PROCEDURE — 0225U NFCT DS DNA&RNA 21 SARSCOV2: CPT

## 2021-09-15 PROCEDURE — 83036 HEMOGLOBIN GLYCOSYLATED A1C: CPT

## 2021-09-15 PROCEDURE — 83550 IRON BINDING TEST: CPT

## 2021-09-15 PROCEDURE — 70450 CT HEAD/BRAIN W/O DYE: CPT

## 2021-09-15 PROCEDURE — 85027 COMPLETE CBC AUTOMATED: CPT

## 2021-09-15 PROCEDURE — 80053 COMPREHEN METABOLIC PANEL: CPT

## 2021-09-15 PROCEDURE — 87077 CULTURE AEROBIC IDENTIFY: CPT

## 2021-09-15 PROCEDURE — 85025 COMPLETE CBC W/AUTO DIFF WBC: CPT

## 2021-09-15 PROCEDURE — 86769 SARS-COV-2 COVID-19 ANTIBODY: CPT

## 2021-09-15 PROCEDURE — 82962 GLUCOSE BLOOD TEST: CPT

## 2021-09-15 PROCEDURE — 96365 THER/PROPH/DIAG IV INF INIT: CPT

## 2021-09-15 PROCEDURE — 82728 ASSAY OF FERRITIN: CPT

## 2021-09-15 PROCEDURE — 94002 VENT MGMT INPAT INIT DAY: CPT

## 2021-09-15 PROCEDURE — 86923 COMPATIBILITY TEST ELECTRIC: CPT

## 2021-09-15 PROCEDURE — 99239 HOSP IP/OBS DSCHRG MGMT >30: CPT

## 2021-09-15 PROCEDURE — 82607 VITAMIN B-12: CPT

## 2021-09-15 PROCEDURE — 83605 ASSAY OF LACTIC ACID: CPT

## 2021-09-15 PROCEDURE — 87641 MR-STAPH DNA AMP PROBE: CPT

## 2021-09-15 PROCEDURE — 84443 ASSAY THYROID STIM HORMONE: CPT

## 2021-09-15 PROCEDURE — 84484 ASSAY OF TROPONIN QUANT: CPT

## 2021-09-15 PROCEDURE — 87640 STAPH A DNA AMP PROBE: CPT

## 2021-09-15 PROCEDURE — 83880 ASSAY OF NATRIURETIC PEPTIDE: CPT

## 2021-09-15 PROCEDURE — 87635 SARS-COV-2 COVID-19 AMP PRB: CPT

## 2021-09-15 PROCEDURE — 81001 URINALYSIS AUTO W/SCOPE: CPT

## 2021-09-15 PROCEDURE — 36415 COLL VENOUS BLD VENIPUNCTURE: CPT

## 2021-09-15 PROCEDURE — 94640 AIRWAY INHALATION TREATMENT: CPT

## 2021-09-15 PROCEDURE — 84439 ASSAY OF FREE THYROXINE: CPT

## 2021-09-15 PROCEDURE — 94799 UNLISTED PULMONARY SVC/PX: CPT

## 2021-09-15 PROCEDURE — 71250 CT THORAX DX C-: CPT

## 2021-09-15 PROCEDURE — 36430 TRANSFUSION BLD/BLD COMPNT: CPT

## 2021-09-15 PROCEDURE — 86900 BLOOD TYPING SEROLOGIC ABO: CPT

## 2021-09-15 PROCEDURE — 71045 X-RAY EXAM CHEST 1 VIEW: CPT

## 2021-09-15 PROCEDURE — 87449 NOS EACH ORGANISM AG IA: CPT

## 2021-09-15 PROCEDURE — 82746 ASSAY OF FOLIC ACID SERUM: CPT

## 2021-09-15 PROCEDURE — 84145 PROCALCITONIN (PCT): CPT

## 2021-09-15 PROCEDURE — P9016: CPT

## 2021-09-15 PROCEDURE — 73551 X-RAY EXAM OF FEMUR 1: CPT

## 2021-09-15 PROCEDURE — 85730 THROMBOPLASTIN TIME PARTIAL: CPT

## 2021-09-15 PROCEDURE — 74018 RADEX ABDOMEN 1 VIEW: CPT

## 2021-09-15 PROCEDURE — 82272 OCCULT BLD FECES 1-3 TESTS: CPT

## 2021-09-15 PROCEDURE — 87070 CULTURE OTHR SPECIMN AEROBIC: CPT

## 2021-09-15 PROCEDURE — 93005 ELECTROCARDIOGRAM TRACING: CPT

## 2021-09-15 PROCEDURE — 94003 VENT MGMT INPAT SUBQ DAY: CPT

## 2021-09-15 PROCEDURE — 85610 PROTHROMBIN TIME: CPT

## 2021-09-15 PROCEDURE — 83540 ASSAY OF IRON: CPT

## 2021-09-15 PROCEDURE — 87086 URINE CULTURE/COLONY COUNT: CPT

## 2021-09-15 RX ORDER — METHOCARBAMOL 500 MG/1
250 TABLET, FILM COATED ORAL
Qty: 0 | Refills: 0 | DISCHARGE
Start: 2021-09-15

## 2021-09-15 RX ORDER — PANTOPRAZOLE SODIUM 20 MG/1
1 TABLET, DELAYED RELEASE ORAL
Qty: 0 | Refills: 0 | DISCHARGE

## 2021-09-15 RX ORDER — SIMETHICONE 80 MG/1
1 TABLET, CHEWABLE ORAL
Qty: 0 | Refills: 0 | DISCHARGE
Start: 2021-09-15

## 2021-09-15 RX ORDER — CHLORHEXIDINE GLUCONATE 213 G/1000ML
15 SOLUTION TOPICAL
Qty: 0 | Refills: 0 | DISCHARGE
Start: 2021-09-15

## 2021-09-15 RX ORDER — HYDROMORPHONE HYDROCHLORIDE 2 MG/ML
2 INJECTION INTRAMUSCULAR; INTRAVENOUS; SUBCUTANEOUS ONCE
Refills: 0 | Status: DISCONTINUED | OUTPATIENT
Start: 2021-09-15 | End: 2021-09-15

## 2021-09-15 RX ORDER — INSULIN GLARGINE 100 [IU]/ML
36 INJECTION, SOLUTION SUBCUTANEOUS
Qty: 0 | Refills: 0 | DISCHARGE
Start: 2021-09-15

## 2021-09-15 RX ORDER — ENOXAPARIN SODIUM 100 MG/ML
40 INJECTION SUBCUTANEOUS
Qty: 0 | Refills: 0 | DISCHARGE

## 2021-09-15 RX ORDER — SODIUM CHLORIDE 9 MG/ML
1000 INJECTION, SOLUTION INTRAVENOUS ONCE
Refills: 0 | Status: COMPLETED | OUTPATIENT
Start: 2021-09-15 | End: 2021-09-15

## 2021-09-15 RX ORDER — PANTOPRAZOLE SODIUM 20 MG/1
40 TABLET, DELAYED RELEASE ORAL
Qty: 0 | Refills: 0 | DISCHARGE
Start: 2021-09-15

## 2021-09-15 RX ADMIN — Medication 81 MILLIGRAM(S): at 11:01

## 2021-09-15 RX ADMIN — INSULIN GLARGINE 36 UNIT(S): 100 INJECTION, SOLUTION SUBCUTANEOUS at 00:14

## 2021-09-15 RX ADMIN — CHLORHEXIDINE GLUCONATE 1 APPLICATION(S): 213 SOLUTION TOPICAL at 11:00

## 2021-09-15 RX ADMIN — Medication 1 GRAM(S): at 00:14

## 2021-09-15 RX ADMIN — SIMETHICONE 80 MILLIGRAM(S): 80 TABLET, CHEWABLE ORAL at 11:01

## 2021-09-15 RX ADMIN — Medication 2: at 05:48

## 2021-09-15 RX ADMIN — Medication 1 MILLIGRAM(S): at 14:35

## 2021-09-15 RX ADMIN — CHLORHEXIDINE GLUCONATE 15 MILLILITER(S): 213 SOLUTION TOPICAL at 05:42

## 2021-09-15 RX ADMIN — POLYETHYLENE GLYCOL 3350 17 GRAM(S): 17 POWDER, FOR SOLUTION ORAL at 11:00

## 2021-09-15 RX ADMIN — Medication 0.5 MILLIGRAM(S): at 08:31

## 2021-09-15 RX ADMIN — SODIUM CHLORIDE 1000 MILLILITER(S): 9 INJECTION, SOLUTION INTRAVENOUS at 06:15

## 2021-09-15 RX ADMIN — Medication 1 MILLIGRAM(S): at 11:00

## 2021-09-15 RX ADMIN — FENTANYL CITRATE 1 PATCH: 50 INJECTION INTRAVENOUS at 07:08

## 2021-09-15 RX ADMIN — METHOCARBAMOL 250 MILLIGRAM(S): 500 TABLET, FILM COATED ORAL at 11:02

## 2021-09-15 RX ADMIN — HYDROMORPHONE HYDROCHLORIDE 2 MILLIGRAM(S): 2 INJECTION INTRAMUSCULAR; INTRAVENOUS; SUBCUTANEOUS at 05:16

## 2021-09-15 RX ADMIN — PANTOPRAZOLE SODIUM 40 MILLIGRAM(S): 20 TABLET, DELAYED RELEASE ORAL at 05:42

## 2021-09-15 RX ADMIN — Medication 0.5 MILLIGRAM(S): at 04:10

## 2021-09-15 RX ADMIN — Medication 2: at 11:00

## 2021-09-15 RX ADMIN — SIMETHICONE 80 MILLIGRAM(S): 80 TABLET, CHEWABLE ORAL at 05:42

## 2021-09-15 RX ADMIN — SIMETHICONE 80 MILLIGRAM(S): 80 TABLET, CHEWABLE ORAL at 00:14

## 2021-09-15 RX ADMIN — Medication 3 MILLILITER(S): at 14:02

## 2021-09-15 RX ADMIN — Medication 1 GRAM(S): at 11:00

## 2021-09-15 RX ADMIN — Medication 1 GRAM(S): at 05:42

## 2021-09-15 RX ADMIN — Medication 2: at 00:16

## 2021-09-15 RX ADMIN — Medication 0.5 MILLIGRAM(S): at 13:30

## 2021-09-15 RX ADMIN — Medication 3 MILLILITER(S): at 06:50

## 2021-09-15 RX ADMIN — LINEZOLID 600 MILLIGRAM(S): 600 INJECTION, SOLUTION INTRAVENOUS at 05:42

## 2021-09-15 RX ADMIN — Medication 1 MILLIGRAM(S): at 00:14

## 2021-09-15 RX ADMIN — Medication 3 MILLILITER(S): at 01:12

## 2021-09-15 RX ADMIN — Medication 1 MILLIGRAM(S): at 04:24

## 2021-09-15 RX ADMIN — Medication 2 TABLET(S): at 11:01

## 2021-09-15 NOTE — PROGRESS NOTE ADULT - SUBJECTIVE AND OBJECTIVE BOX
Patient is a 75y old  Female who presents with a chief complaint of Unresponsive, post-cardiac arrest (15 Sep 2021 09:01)    INTERVAL HPI/OVERNIGHT EVENTS: no new events    MEDICATIONS  (STANDING):  albuterol/ipratropium for Nebulization 3 milliLiter(s) Nebulizer every 6 hours  aspirin  chewable 81 milliGRAM(s) Enteral Tube daily  bisacodyl Suppository 10 milliGRAM(s) Rectal at bedtime  chlorhexidine 0.12% Liquid 15 milliLiter(s) Oral Mucosa two times a day  chlorhexidine 2% Cloths 1 Application(s) Topical daily  dextrose 40% Gel 15 Gram(s) Oral once  dextrose 5%. 1000 milliLiter(s) (50 mL/Hr) IV Continuous <Continuous>  dextrose 5%. 1000 milliLiter(s) (100 mL/Hr) IV Continuous <Continuous>  dextrose 50% Injectable 25 Gram(s) IV Push once  dextrose 50% Injectable 12.5 Gram(s) IV Push once  dextrose 50% Injectable 25 Gram(s) IV Push once  glucagon  Injectable 1 milliGRAM(s) IntraMuscular once  insulin glargine Injectable (LANTUS) 36 Unit(s) SubCutaneous at bedtime  insulin lispro (ADMELOG) corrective regimen sliding scale   SubCutaneous every 6 hours  lactated ringers. 1000 milliLiter(s) (75 mL/Hr) IV Continuous <Continuous>  lactobacillus acidophilus 2 Tablet(s) Oral daily  LORazepam     Tablet 1 milliGRAM(s) Oral every 6 hours  methocarbamol 250 milliGRAM(s) Oral daily  pantoprazole   Suspension 40 milliGRAM(s) Enteral Tube two times a day  polyethylene glycol 3350 17 Gram(s) Oral daily  senna 2 Tablet(s) Oral at bedtime  simethicone 80 milliGRAM(s) Chew every 6 hours  sucralfate suspension 1 Gram(s) Oral four times a day    MEDICATIONS  (PRN):  acetaminophen   Tablet .. 650 milliGRAM(s) Oral every 6 hours PRN Temp greater or equal to 38C (100.4F), Mild Pain (1 - 3)  LORazepam     Tablet 0.5 milliGRAM(s) Oral every 2 hours PRN Agitation  sodium chloride 0.9% lock flush 10 milliLiter(s) IV Push every 1 hour PRN Pre/post blood products, medications, blood draw, and to maintain line patency    Allergies  codeine (Hives)    REVIEW OF SYSTEMS:  unable to obtain    Vital Signs Last 24 Hrs  T(C): 36.8 (15 Sep 2021 08:45), Max: 37.1 (14 Sep 2021 21:29)  T(F): 98.3 (15 Sep 2021 08:45), Max: 98.8 (14 Sep 2021 21:29)  HR: 75 (15 Sep 2021 10:00) (59 - 105)  BP: 114/72 (15 Sep 2021 10:00) (81/44 - 120/52)  BP(mean): 84 (15 Sep 2021 10:00) (57 - 84)  RR: 15 (15 Sep 2021 10:00) (13 - 34)  SpO2: 100% (15 Sep 2021 10:00) (96% - 100%)    PHYSICAL EXAM:  GENERAL: NAD  HEAD:  Atraumatic, Normocephalic  EYES: conjunctiva and sclera clear  ENMT: Moist mucous membranes, trach -> vent  NERVOUS SYSTEM:  eyes open, contracted.   CHEST/LUNG: Clear to auscultation bilaterally;   HEART: Regular rate and rhythm;  ABDOMEN: Soft, Nontender, Nondistended; Bowel sounds present  EXTREMITIES:  No clubbing, cyanosis, or edema      LABS:                        8.2    6.81  )-----------( 190      ( 15 Sep 2021 07:40 )             26.8     15 Sep 2021 07:10    141    |  111    |  21     ----------------------------<  130    3.7     |  19     |  1.00     Ca    7.0        15 Sep 2021 07:10    TPro  5.1    /  Alb  1.7    /  TBili  0.2    /  DBili  x      /  AST  21     /  ALT  23     /  AlkPhos  55     15 Sep 2021 07:10        CAPILLARY BLOOD GLUCOSE  POCT Blood Glucose.: 164 mg/dL (15 Sep 2021 05:44)  POCT Blood Glucose.: 153 mg/dL (15 Sep 2021 00:09)  POCT Blood Glucose.: 130 mg/dL (14 Sep 2021 18:15)  POCT Blood Glucose.: 184 mg/dL (14 Sep 2021 11:35)      RADIOLOGY & ADDITIONAL TESTS:    Imaging Personally Reviewed:  [ ] YES     Consultant(s) Notes Reviewed:      Care Discussed with Consultants/Other Providers:    Advanced Directives: [ ] DNR  [ ] No feeding tube  [ ] MOLST in chart  [ ] MOLST completed today  [ ] Unknown

## 2021-09-15 NOTE — PROGRESS NOTE ADULT - PROBLEM SELECTOR PLAN 1
Pt had cardiac arrest, now in post-arrest care  - Pt is Full Code  - hypotension, on levophed, admitted to ICU  - Cards consulted, cont to f/u on recs
cont local wound care  cont IV ABX as per ID  will cont to follow and discuss with all attendings
cont local wound care  cont IV ABX as per ID  will cont to follow and discuss with all attendings
partial nail avulsion right hallux tibial and fibular aspects with dilute betadine saline scrub  cont local wound care   cont IV ABX as per ID  guarded prognosis for hallux distal aspect  will cont to follow and discuss with all attendings
Pt had cardiac arrest, now in post-arrest care  - Pt is Full Code  - hypotension resolved, has been off levophed.
Pt had cardiac arrest, now in post-arrest care  - Pt is Full Code  - hypotension resolved, has been off levophed.
Pt had cardiac arrest, now in post-arrest care  - Pt is Full Code  - hypotension resolved, now off levophed.

## 2021-09-15 NOTE — DISCHARGE NOTE PROVIDER - NSDCMRMEDTOKEN_GEN_ALL_CORE_FT
acetaminophen 160 mg/5 mL oral suspension: 20.31 milliliter(s) by gastrostomy tube every 6 hours, As Needed  albuterol 90 mcg/inh inhalation aerosol: 2 puff(s) inhaled every 6 hours  aspirin 81 mg oral tablet, chewable: 1 tab(s) by PEG tube once a day  Bacid (LAC) oral tablet: 2 tab(s) by gastrostomy tube once a day  bisacodyl 5 mg oral delayed release tablet: 1 tab(s) orally once a day (at bedtime)  Carafate 1 g/10 mL oral suspension: 10 milliliter(s) by gastrostomy tube 4 times a day (before meals and at bedtime) for 14 days (Started 6/4/21)  enoxaparin 40 mg/0.4 mL injectable solution: 40 milligram(s) injectable once a day  fentaNYL 12 mcg/hr transdermal film, extended release: 1 patch transdermal every 72 hours  insulin lispro 100 units/mL injectable solution:  injectable corrective regimen sliding scale   ipratropium-albuterol 0.5 mg-2.5 mg/3 mL inhalation solution: 3 milliliter(s) inhaled 4 times a day  Lantus Solostar Pen 100 units/mL subcutaneous solution: 40 unit(s) subcutaneous once a day (at bedtime)  LORazepam 1 mg oral tablet: 1 tab(s) by gastrostomy tube every 8 hours  meropenem 1000 mg intravenous injection: 1000 milligram(s) intravenous every 8 hours x 3 doses   pantoprazole 40 mg oral granule, delayed release: 1 packet(s) by gastrostomy tube once a day  polyethylene glycol 3350 oral powder for reconstitution: 17 gram(s) orally every 12 hours  senna 8.6 mg oral tablet: 1 tab(s) by gastrostomy tube once a day (at bedtime)   acetaminophen 160 mg/5 mL oral suspension: 20.31 milliliter(s) by gastrostomy tube every 6 hours, As Needed  albuterol 90 mcg/inh inhalation aerosol: 2 puff(s) inhaled every 6 hours  aspirin 81 mg oral tablet, chewable: 1 tab(s) by PEG tube once a day  Bacid (LAC) oral tablet: 2 tab(s) by gastrostomy tube once a day  bisacodyl 5 mg oral delayed release tablet: 1 tab(s) orally once a day (at bedtime)  Carafate 1 g/10 mL oral suspension: 10 milliliter(s) by gastrostomy tube 4 times a day (before meals and at bedtime) for 14 days (Started 6/4/21)  chlorhexidine 0.12% mucous membrane liquid: 15 milliliter(s) mucous membrane 2 times a day  insulin glargine: 36 unit(s) subcutaneous once a day (at bedtime)  ipratropium-albuterol 0.5 mg-2.5 mg/3 mL inhalation solution: 3 milliliter(s) inhaled 4 times a day  LORazepam 0.5 mg oral tablet: 1 tab(s) orally every 2 hours, As needed, Agitation  LORazepam 1 mg oral tablet: 1 tab(s) orally every 6 hours  methocarbamol: 250 milligram(s) by gastrostomy tube 2 times a day  pantoprazole 40 mg oral granule, delayed release: 40 milligram(s) by gastrostomy tube 2 times a day  polyethylene glycol 3350 oral powder for reconstitution: 17 gram(s) orally every 12 hours  senna 8.6 mg oral tablet: 1 tab(s) by gastrostomy tube once a day (at bedtime)  simethicone 80 mg oral tablet, chewable: 1 tab(s) orally every 6 hours

## 2021-09-15 NOTE — PROGRESS NOTE ADULT - PROBLEM SELECTOR PROBLEM 7
GERD (gastroesophageal reflux disease)
R/O Need for prophylactic measure
GERD (gastroesophageal reflux disease)

## 2021-09-15 NOTE — PROGRESS NOTE ADULT - PROBLEM SELECTOR PLAN 9
DVT proph: pharmacological proph contraindicated with GIB.  PAS ordered.
DVT proph: pharmacological proph contraindicated with GIB.  PAS ordered.    switch meds to PEG version  hopefully anticipate discharge in 48-72 hours.
DVT proph: pharmacological proph contraindicated with GIB.  PAS ordered.
DVT proph: pharmacological proph contraindicated with GIB.  PAS ordered.
DVT proph: pharmacological proph contraindicated with GIB.  PAS ordered.    switch meds to PEG version  hopefully anticipate discharge later today if repeat CBC remains stable.
DVT proph: pharmacological proph contraindicated with GIB.  PAS ordered.
DVT proph: pharmacological proph contraindicated with GIB.  PAS ordered.    switch meds to PEG version  hopefully anticipate discharge in 48-72 hours.

## 2021-09-15 NOTE — DISCHARGE NOTE PROVIDER - CARE PROVIDER_API CALL
Paul Merrill  INTERNAL MEDICINE  [address] [place] [address], [address]  [place] [address]  Phone: [phone]  Fax: [phone]  Follow Up Time:

## 2021-09-15 NOTE — CHART NOTE - NSCHARTNOTEFT_GEN_A_CORE
Left IJ central line removed with patient in supine position.    no bleeding noted.  Dressing applied.  RN aware to check prior to discharge.

## 2021-09-15 NOTE — PROGRESS NOTE ADULT - PROBLEM SELECTOR PROBLEM 5
Involuntary jerky movements
R/O HTN (hypertension)
Involuntary jerky movements

## 2021-09-15 NOTE — PROGRESS NOTE ADULT - ASSESSMENT
The patient is a 75 year old female with a history of HTN, DM, CVA, dementia, chronic respiratory failure s/p trach, PEG who is admitted after cardiac arrest.     Plan:  - ECG with RBBB and nonspecific findings  - Troponin mildly elevated at 0.127 in the setting of demand ischemia from cardiac arrest, septic shock  - Echo with normal LV systolic function, mild pulm HTN  - Off of pressors  - At times hypertensive and tachycardic due to underlying respiratory issues  - Continue aspirin 81 mg daily - can hold if needed due to anemia  - Monitor hemoglobin - transfuse today  - Completed linezolid  - Mechanical ventilation  - Overall prognosis very poor  - Discharge planning The patient is a 75 year old female with a history of HTN, DM, CVA, dementia, chronic respiratory failure s/p trach, PEG who is admitted after cardiac arrest.     Plan:  - ECG with RBBB and nonspecific findings  - Troponin mildly elevated at 0.127 in the setting of demand ischemia from cardiac arrest, septic shock  - Echo with normal LV systolic function, mild pulm HTN  - Off of pressors  - At times hypertensive and tachycardic due to underlying respiratory issues  - Continue aspirin 81 mg daily - can hold if needed due to anemia  - Monitor hemoglobin - transfused yesterday  - Completed linezolid  - Mechanical ventilation  - Overall prognosis very poor  - Discharge planning

## 2021-09-15 NOTE — CHART NOTE - NSCHARTNOTEFT_GEN_A_CORE
Notified by RN pt with uncomfortable breathing pattern (pt on MV via trac); verified by RN and Hospitalist pt does this episodically. Pt given an extra dose of Ativan  1 mg via PEG

## 2021-09-15 NOTE — PROGRESS NOTE ADULT - REASON FOR ADMISSION
Unresponsive, post-cardiac arrest

## 2021-09-15 NOTE — PROGRESS NOTE ADULT - PROBLEM SELECTOR PLAN 2
Proteus mirabilus UTI  aspiration pneumonia on CT  Perionychia of left first toe - Podiatry Dr Olegario roman appreciated  completed Zosyn & completing linezolid today per Dr Olivas from ID  Leukocytosis resolved  monitor fever curve.
Proteus mirabilus UTI  aspiration pneumonia on CT  Perionychia of left first toe - Podiatry Dr Olegario roman appreciated  completed Zosyn & linezolid per Dr Olivas from ID  Leukocytosis resolved  no further fevers
Suggestion of bilateral aspiration pneumonia on CT chest, possible UTI as well  - as per ID consult Dr. Brendon quintanilla, pt started on IV zosyn, will cont for now  - Hypotension, started on levophed; elevated WBC to 22 on admisison, will trend on CBC, and monitor for fevers  - NPO, pt has peg tube  - tylenol prn fever, pain
Proteus mirabilus UTI  aspiration pneumonia on CT  now with Perionychia of left first toe - Podiatry Dr Melvin to eval in AM.   on Zosyn & linezolid per Dr Olivas from ID  Leukocytosis resolved  monitor fever curve.
Proteus mirabilus UTI  aspiration pneumonia on CT  on Zosyn per Dr Olivas from ID  Leukocytosis resolved  monitor fever curve.
Proteus mirabilus UTI  aspiration pneumonia on CT  Perionychia of left first toe - Podiatry Dr Olegario roman appreciated  completed Zosyn & one more day linezolid per Dr Olivas from ID  Leukocytosis resolved  monitor fever curve.
Proteus mirabilus UTI  aspiration pneumonia on CT  now with Perionychia of left first toe - Podiatry Dr Melvin to eval in AM.   on Zosyn & linezolid per Dr Olivas from ID  Leukocytosis resolved  monitor fever curve.
Proteus mirabilus UTI  aspiration pneumonia on CT  now with Perionychia of left first toe - Podiatry Dr Melvin to eval in AM.   on Zosyn & linezolid per Dr Olivas from ID  Leukocytosis resolved  monitor fever curve.

## 2021-09-15 NOTE — DISCHARGE NOTE PROVIDER - HOSPITAL COURSE
75F chronic respiratory failure trach/vent dependent, total care/functional quadriplegia with PEG tube, DM2 on insulin, HTN, GERD, CVA, dementia, chronic constipation/ileus p/w being found unresponsive at [facility], brought in by EMS for post cardiac arrest care.       In ED, pt was given IVF NS bolus, was found to be hypotensive, and started on levophed for pressor support, and CT showed signs of aspiration PNA, an dpt was started on IV zosyn.    Patient admitted to [facility].  initially on levophed but then weaned off.     Completed IV Zosyn for aspiration pneumonia seen on CT and Proteus mirabilus UTI  Perionychia of left first toe also completed course of zyvox     Anemia - Chronic disease + Iron deficiency possibly related to GI blood loss.  Received IV iron plus 1 unit prbc.  as per GI PPI BId and conservative management.  No signs of overt bleeding.  CT negative for RP bleed.     Diabetes type 2 on insulin - continued on lantus with moderate coverage scale.     Involuntary jerky movements - not seizure activity.  AS per  related to chronic Ileus issues.  treated with ativan prn.     Cleared to return to chronic vent unit at SNF.

## 2021-09-15 NOTE — PROGRESS NOTE ADULT - ASSESSMENT
74 yo f pmhx dementia, CVA, VDRF s/p trach/peg, quadriplegia, DM, HTN, GERD, constipation admitted with   s/p card arrest - sepsis - shock - acute infection - dementia - chr resp failure    s/p IVF  completed ABX    covid pcr repeat NEG on 9 12 21    Podiatry cx noted - f/u reviewed -   Robaxin added -     neuro notes reviewed - vs noted -     ID f/u noted    GOC discussion  pt is full code   is the HCP  on TF  on NEBS  on ABX rx regimen  assist with needs - ADL  HOB elev - orgal hygiene - skin care  prognosis guarded to poor

## 2021-09-15 NOTE — PROGRESS NOTE ADULT - PROBLEM SELECTOR PLAN 7
Continue IV PPI BID
Continue PPI BID
Continue IV PPI BID
cont lovenox, renally dosed
Continue IV PPI BID
Continue PPI BID
Continue IV PPI BID
Continue PPI BID

## 2021-09-15 NOTE — PROGRESS NOTE ADULT - SUBJECTIVE AND OBJECTIVE BOX
Neurology follow up note    BREA [id number]      Interval History:    Patient resting in bed     Allergies    codeine (Hives)    Intolerances        MEDICATIONS    acetaminophen   Tablet .. 650 milliGRAM(s) Oral every 6 hours PRN  albuterol/ipratropium for Nebulization 3 milliLiter(s) Nebulizer every 6 hours  aspirin  chewable 81 milliGRAM(s) Enteral Tube daily  bisacodyl Suppository 10 milliGRAM(s) Rectal at bedtime  chlorhexidine 0.12% Liquid 15 milliLiter(s) Oral Mucosa two times a day  chlorhexidine 2% Cloths 1 Application(s) Topical daily  dextrose 40% Gel 15 Gram(s) Oral once  dextrose 5%. 1000 milliLiter(s) IV Continuous <Continuous>  dextrose 5%. 1000 milliLiter(s) IV Continuous <Continuous>  dextrose 50% Injectable 25 Gram(s) IV Push once  dextrose 50% Injectable 12.5 Gram(s) IV Push once  dextrose 50% Injectable 25 Gram(s) IV Push once  glucagon  Injectable 1 milliGRAM(s) IntraMuscular once  insulin glargine Injectable (LANTUS) 36 Unit(s) SubCutaneous at bedtime  insulin lispro (ADMELOG) corrective regimen sliding scale   SubCutaneous every 6 hours  lactated ringers. 1000 milliLiter(s) IV Continuous <Continuous>  lactobacillus acidophilus 2 Tablet(s) Oral daily  LORazepam     Tablet 1 milliGRAM(s) Oral every 6 hours  LORazepam     Tablet 0.5 milliGRAM(s) Oral every 2 hours PRN  methocarbamol 250 milliGRAM(s) Oral daily  pantoprazole   Suspension 40 milliGRAM(s) Enteral Tube two times a day  polyethylene glycol 3350 17 Gram(s) Oral daily  senna 2 Tablet(s) Oral at bedtime  simethicone 80 milliGRAM(s) Chew every 6 hours  sodium chloride 0.9% lock flush 10 milliLiter(s) IV Push every 1 hour PRN  sucralfate suspension 1 Gram(s) Oral four times a day              Vital Signs Last 24 Hrs  T(C): 36.8 (15 Sep 2021 08:45), Max: 37.1 (14 Sep 2021 21:29)  T(F): 98.3 (15 Sep 2021 08:45), Max: 98.8 (14 Sep 2021 21:29)  HR: 73 (15 Sep 2021 11:18) (59 - 105)  BP: 114/72 (15 Sep 2021 10:00) (81/44 - 120/52)  BP(mean): 84 (15 Sep 2021 10:00) (57 - 84)  RR: 15 (15 Sep 2021 10:00) (13 - 34)  SpO2: 100% (15 Sep 2021 11:18) (96% - 100%)      REVIEW OF SYSTEMS:  Could not be obtained secondary to the patient being nonverbal.  As per my conversation with the spouse, a gradual process along with underlying strokes causing her to become bed-bound.    PHYSICAL EXAMINATION:    Head:  Normocephalic, atraumatic.  Eyes:  No scleral icterus.  Ears:  Cannot be evaluated secondary to the patient being nonverbal.  NECK:  Tracheostomy was in place.  RESPIRATORY:  Good air entry bilaterally.  CARDIOVASCULAR:  S1 and S2 heard.  ABDOMEN:  Soft and nontender.  EXTREMITIES:  No clubbing or cyanosis were noted.      NEUROLOGIC:  The patient was awake in bed.  At present no movement.  does have a history upon stimulating the patient, her eyes did roll up.  Her body started to stiffen with abnormal mouth movements, lasted one to two minutes history of this  Pupils bilaterally were 3 mm, reactive to 2 mm.  The patient has her arms in a flexed position with both hands contracted.  Bilateral lower extremities were in a straight position.  The patient has increased tone, bilateral upper and lower extremities.                      LABS:  CBC Full  -  ( 15 Sep 2021 07:40 )  WBC Count : 6.81 K/uL  RBC Count : 2.99 M/uL  Hemoglobin : 8.2 g/dL  Hematocrit : 26.8 %  Platelet Count - Automated : 190 K/uL  Mean Cell Volume : 89.6 fl  Mean Cell Hemoglobin : 27.4 pg  Mean Cell Hemoglobin Concentration : 30.6 gm/dL  Auto Neutrophil # : x  Auto Lymphocyte # : x  Auto Monocyte # : x  Auto Eosinophil # : x  Auto Basophil # : x  Auto Neutrophil % : x  Auto Lymphocyte % : x  Auto Monocyte % : x  Auto Eosinophil % : x  Auto Basophil % : x      09-15    141  |  111<H>  |  21  ----------------------------<  130<H>  3.7   |  19<L>  |  1.00    Ca    7.0<L>      15 Sep 2021 07:10    TPro  5.1<L>  /  Alb  1.7<L>  /  TBili  0.2  /  DBili  x   /  AST  21  /  ALT  23  /  AlkPhos  55  09-15    Hemoglobin A1C:     LIVER FUNCTIONS - ( 15 Sep 2021 07:10 )  Alb: 1.7 g/dL / Pro: 5.1 g/dL / ALK PHOS: 55 U/L / ALT: 23 U/L DA / AST: 21 U/L / GGT: x           Vitamin B12         RADIOLOGY        ANALYSIS AND PLAN:  This is a 75-year-old with an episode of cardiac arrest and abnormal movements.  For episodes of cardiac arrest, continue to monitor heart rate on telemetry.  If possible, please maintain a systolic blood pressure greater than 100, to help maintain cerebral perfusion.  For history of abnormal movements, as per my conversation with the spouse, this is a known condition that happens to her, as per him with GI-related issues.  She will start to hyperventilate, eyes will roll up, will have abnormal mouth movement, and tried to move from side-to-side.  She has undergone extensive EEG monitoring and has captured these events on numerous occasions and deemed to be non-epileptiform.  For now, I would recommend supportive therapy.  No antiepileptic medications.  For history of diabetes, strict control of blood sugars.  For history of cerebrovascular accident, continue the patient on her home medications.  spoke to spouse agreeable to try muscle relaxant     The spouse's name is Marciano.  His telephone number is [phone] 9/14.  I had a lengthy discussion, we will not be starting any antiepileptic medications.    Greater than 40 minutes of time was spent with the patient, plan of care, reviewing data, speaking to the multidisciplinary healthcare team with greater than 50% of that time in counseling and care coordination.

## 2021-09-15 NOTE — PROGRESS NOTE ADULT - SUBJECTIVE AND OBJECTIVE BOX
Chief Complaint: Cardiac arrest    Interval Events: No events overnight.    Review of Systems:  Unable to obtain    Physical Exam:  Vital Signs Last 24 Hrs  T(C): 36.8 (15 Sep 2021 08:45), Max: 37.1 (14 Sep 2021 21:29)  T(F): 98.3 (15 Sep 2021 08:45), Max: 98.8 (14 Sep 2021 21:29)  HR: 75 (15 Sep 2021 10:00) (59 - 105)  BP: 114/72 (15 Sep 2021 10:00) (81/44 - 120/52)  BP(mean): 84 (15 Sep 2021 10:00) (57 - 84)  RR: 15 (15 Sep 2021 10:00) (13 - 34)  SpO2: 100% (15 Sep 2021 10:00) (96% - 100%)  General: Chronically ill appearing  HEENT: Trach  Neck: No JVD, no carotid bruit  Lungs: CTAB  CV: RRR, nl S1/S2, no M/R/G  Abdomen: S/NT/ND, +BS  Extremities: No LE edema, no cyanosis  Neuro: AAOx0, contracted  Skin: No rash    Labs:             09-15    141  |  111<H>  |  21  ----------------------------<  130<H>  3.7   |  19<L>  |  1.00    Ca    7.0<L>      15 Sep 2021 07:10    TPro  5.1<L>  /  Alb  1.7<L>  /  TBili  0.2  /  DBili  x   /  AST  21  /  ALT  23  /  AlkPhos  55  09-15                        8.2    6.81  )-----------( 190      ( 15 Sep 2021 07:40 )             26.8     Telemetry: Sinus rhythm

## 2021-09-15 NOTE — PROGRESS NOTE ADULT - PROBLEM SELECTOR PLAN 8
BP labile at the moment  continue IVF  off levophed  [facility] monitoring
BP labile at the moment  off IVF  off levophed  [facility] monitoring
BP improved  off IVF  off levophed  [facility] monitoring
BP labile at the moment  continue IVF  off levophed  [facility] monitoring

## 2021-09-15 NOTE — PROGRESS NOTE ADULT - PROBLEM SELECTOR PLAN 4
Guaiac positive  GI follow up
Guaiac positive, iron deficiency noted - will replete with IV Iron  GI follow up appreciated  conservative management.
Guaiac positive, iron deficiency noted - s/p iron repletion  GI follow up appreciated  conservative management.  Continue protonix BID    Discussed risks/benefits of transfusion with  who agreed to prbc transfusion.    CT a/p to r/o RP bleed.  no obvious melena recently.
cont lantus 36 u qhs, coverage insulin scale, FS q 6
Guaiac positive, iron deficiency noted - s/p iron repletion  GI follow up appreciated  conservative management.  Continue protonix BID  s/p PRBC 1 unit with stable hgb
Guaiac positive, iron deficiency noted - will replete with IV Iron  GI follow up appreciated  conservative management.
Guaiac positive, iron deficiency noted - will replete with IV Iron  GI follow up appreciated  conservative management.  recheck H/H.  switch protonix to suspension in anticipation of discharge
Guaiac positive, iron deficiency noted - will replete with IV Iron  GI follow up appreciated  conservative management.  recheck H/H.

## 2021-09-15 NOTE — PROGRESS NOTE ADULT - SUBJECTIVE AND OBJECTIVE BOX
Chief Complaint:        INTERVAL HPI/OVERNIGHT EVENTS:    no significant events overnight reported     MEDICATIONS  (STANDING):  albuterol/ipratropium for Nebulization 3 milliLiter(s) Nebulizer every 6 hours  aspirin  chewable 81 milliGRAM(s) Enteral Tube daily  bisacodyl Suppository 10 milliGRAM(s) Rectal at bedtime  chlorhexidine 0.12% Liquid 15 milliLiter(s) Oral Mucosa two times a day  chlorhexidine 2% Cloths 1 Application(s) Topical daily  dextrose 40% Gel 15 Gram(s) Oral once  dextrose 5%. 1000 milliLiter(s) (50 mL/Hr) IV Continuous <Continuous>  dextrose 5%. 1000 milliLiter(s) (100 mL/Hr) IV Continuous <Continuous>  dextrose 50% Injectable 25 Gram(s) IV Push once  dextrose 50% Injectable 12.5 Gram(s) IV Push once  dextrose 50% Injectable 25 Gram(s) IV Push once  glucagon  Injectable 1 milliGRAM(s) IntraMuscular once  insulin glargine Injectable (LANTUS) 36 Unit(s) SubCutaneous at bedtime  insulin lispro (ADMELOG) corrective regimen sliding scale   SubCutaneous every 6 hours  lactated ringers. 1000 milliLiter(s) (75 mL/Hr) IV Continuous <Continuous>  lactobacillus acidophilus 2 Tablet(s) Oral daily  LORazepam     Tablet 1 milliGRAM(s) Oral every 6 hours  methocarbamol 250 milliGRAM(s) Oral daily  pantoprazole   Suspension 40 milliGRAM(s) Enteral Tube two times a day  polyethylene glycol 3350 17 Gram(s) Oral daily  senna 2 Tablet(s) Oral at bedtime  simethicone 80 milliGRAM(s) Chew every 6 hours  sucralfate suspension 1 Gram(s) Oral four times a day    MEDICATIONS  (PRN):  acetaminophen   Tablet .. 650 milliGRAM(s) Oral every 6 hours PRN Temp greater or equal to 38C (100.4F), Mild Pain (1 - 3)  LORazepam     Tablet 0.5 milliGRAM(s) Oral every 2 hours PRN Agitation  sodium chloride 0.9% lock flush 10 milliLiter(s) IV Push every 1 hour PRN Pre/post blood products, medications, blood draw, and to maintain line patency            Vital Signs Last 24 Hrs  T(C): 36.8 (15 Sep 2021 08:45), Max: 37.1 (14 Sep 2021 21:29)  T(F): 98.3 (15 Sep 2021 08:45), Max: 98.8 (14 Sep 2021 21:29)  HR: 74 (15 Sep 2021 12:00) (59 - 105)  BP: 118/68 (15 Sep 2021 12:00) (81/44 - 120/52)  BP(mean): 84 (15 Sep 2021 12:00) (57 - 84)  RR: 11 (15 Sep 2021 12:00) (11 - 34)  SpO2: 100% (15 Sep 2021 12:00) (96% - 100%)    Physical exam:    abd soft,non tender. peg        LABS:                        9.3    6.61  )-----------( 217      ( 15 Sep 2021 12:27 )             30.7     09-15    141  |  111<H>  |  21  ----------------------------<  130<H>  3.7   |  19<L>  |  1.00    Ca    7.0<L>      15 Sep 2021 07:10    TPro  5.1<L>  /  Alb  1.7<L>  /  TBili  0.2  /  DBili  x   /  AST  21  /  ALT  23  /  AlkPhos  55  09-15          RADIOLOGY & ADDITIONAL TESTS:

## 2021-09-15 NOTE — PROGRESS NOTE ADULT - SUBJECTIVE AND OBJECTIVE BOX
BREA BECKHAM     [facility]    Allergies    codeine (Hives)    Intolerances        PAST MEDICAL & SURGICAL HISTORY:  Dementia of frontal lobe type    Aphasic stroke    Diabetes mellitus    Respiratory failure    Hypertension    GERD (gastroesophageal reflux disease)    Constipation    Respiratory failure    CVA (cerebral vascular accident)    HTN (hypertension)    DM (diabetes mellitus)    Advanced dementia    COVID-19 virus detected    Quadriplegia    Pneumonia    Type II diabetes mellitus    Hx of appendectomy    Gastrostomy in place    Tracheostomy in place    Tracheostomy tube present    Feeding by G-tube        FAMILY HISTORY:  No pertinent family history in first degree relatives        Home Medications:  acetaminophen 160 mg/5 mL oral suspension: 20.31 milliliter(s) by gastrostomy tube every 6 hours, As Needed (23 Jun 2021 09:08)  albuterol 90 mcg/inh inhalation aerosol: 2 puff(s) inhaled every 6 hours (23 Jun 2021 09:08)  aspirin 81 mg oral tablet, chewable: 1 tab(s) by PEG tube once a day (15 Zay 2021 15:07)  Bacid (LAC) oral tablet: 2 tab(s) by gastrostomy tube once a day (23 Jun 2021 09:08)  bisacodyl 5 mg oral delayed release tablet: 1 tab(s) orally once a day (at bedtime) (23 Jun 2021 09:08)  Carafate 1 g/10 mL oral suspension: 10 milliliter(s) by gastrostomy tube 4 times a day (before meals and at bedtime) for 14 days (Started 6/4/21) (15 Zay 2021 15:07)  enoxaparin 40 mg/0.4 mL injectable solution: 40 milligram(s) injectable once a day (15 Zay 2021 15:07)  fentaNYL 12 mcg/hr transdermal film, extended release: 1 patch transdermal every 72 hours (23 Jun 2021 09:08)  insulin lispro 100 units/mL injectable solution:  injectable corrective regimen sliding scale  (23 Jun 2021 09:08)  ipratropium-albuterol 0.5 mg-2.5 mg/3 mL inhalation solution: 3 milliliter(s) inhaled 4 times a day (15 Zay 2021 15:07)  Lantus Solostar Pen 100 units/mL subcutaneous solution: 40 unit(s) subcutaneous once a day (at bedtime) (15 Zay 2021 15:07)  LORazepam 1 mg oral tablet: 1 tab(s) by gastrostomy tube every 8 hours (23 Jun 2021 09:08)  meropenem 1000 mg intravenous injection: 1000 milligram(s) intravenous every 8 hours x 3 doses  (23 Jun 2021 09:08)  pantoprazole 40 mg oral granule, delayed release: 1 packet(s) by gastrostomy tube once a day (23 Jun 2021 09:08)  polyethylene glycol 3350 oral powder for reconstitution: 17 gram(s) orally every 12 hours (23 Jun 2021 09:08)  senna 8.6 mg oral tablet: 1 tab(s) by gastrostomy tube once a day (at bedtime) (23 Jun 2021 09:08)      MEDICATIONS  (STANDING):  albuterol/ipratropium for Nebulization 3 milliLiter(s) Nebulizer every 6 hours  aspirin  chewable 81 milliGRAM(s) Enteral Tube daily  bisacodyl Suppository 10 milliGRAM(s) Rectal at bedtime  chlorhexidine 0.12% Liquid 15 milliLiter(s) Oral Mucosa two times a day  chlorhexidine 2% Cloths 1 Application(s) Topical daily  dextrose 40% Gel 15 Gram(s) Oral once  dextrose 5%. 1000 milliLiter(s) (50 mL/Hr) IV Continuous <Continuous>  dextrose 5%. 1000 milliLiter(s) (100 mL/Hr) IV Continuous <Continuous>  dextrose 50% Injectable 25 Gram(s) IV Push once  dextrose 50% Injectable 12.5 Gram(s) IV Push once  dextrose 50% Injectable 25 Gram(s) IV Push once  glucagon  Injectable 1 milliGRAM(s) IntraMuscular once  insulin glargine Injectable (LANTUS) 36 Unit(s) SubCutaneous at bedtime  insulin lispro (ADMELOG) corrective regimen sliding scale   SubCutaneous every 6 hours  lactated ringers. 1000 milliLiter(s) (75 mL/Hr) IV Continuous <Continuous>  lactobacillus acidophilus 2 Tablet(s) Oral daily  LORazepam     Tablet 1 milliGRAM(s) Oral every 6 hours  methocarbamol 250 milliGRAM(s) Oral daily  pantoprazole   Suspension 40 milliGRAM(s) Enteral Tube two times a day  polyethylene glycol 3350 17 Gram(s) Oral daily  senna 2 Tablet(s) Oral at bedtime  simethicone 80 milliGRAM(s) Chew every 6 hours  sucralfate suspension 1 Gram(s) Oral four times a day    MEDICATIONS  (PRN):  acetaminophen   Tablet .. 650 milliGRAM(s) Oral every 6 hours PRN Temp greater or equal to 38C (100.4F), Mild Pain (1 - 3)  LORazepam     Tablet 0.5 milliGRAM(s) Oral every 2 hours PRN Agitation  sodium chloride 0.9% lock flush 10 milliLiter(s) IV Push every 1 hour PRN Pre/post blood products, medications, blood draw, and to maintain line patency      Diet, NPO with Tube Feed:   Tube Feeding Modality: Gastrostomy  Vital 1.5 Horacio  Total Volume for 24 Hours (mL): 1000  Continuous  Starting Tube Feed Rate mL per Hour: 40  Increase Tube Feed Rate by (mL): 10     Every 6 hours  Until Goal Tube Feed Rate (mL per Hour): 50  Tube Feed Duration (in Hours): 20  Tube Feed Start Time: 13:00  No Carb Prosource TF     Qty per Day:  Once Daily via PEG (09-13-21 @ 11:59) [Active]          Vital Signs Last 24 Hrs  T(C): 37.1 (15 Sep 2021 04:00), Max: 37.1 (14 Sep 2021 21:29)  T(F): 98.7 (15 Sep 2021 04:00), Max: 98.8 (14 Sep 2021 21:29)  HR: 68 (15 Sep 2021 07:02) (59 - 105)  BP: 119/48 (15 Sep 2021 04:06) (95/49 - 120/52)  BP(mean): 68 (15 Sep 2021 04:06) (57 - 79)  RR: 14 (15 Sep 2021 07:00) (14 - 34)  SpO2: 100% (15 Sep 2021 07:02) (96% - 100%)      09-14-21 @ 07:01  -  09-15-21 @ 07:00  --------------------------------------------------------  IN: 3433 mL / OUT: 250 mL / NET: 3183 mL    09-15-21 @ 07:01  -  09-15-21 @ 07:49  --------------------------------------------------------  IN: 125 mL / OUT: 0 mL / NET: 125 mL        Mode: AC/ CMV (Assist Control/ Continuous Mandatory Ventilation), RR (machine): 14, TV (machine): 400, FiO2: 30, PEEP: 5, ITime: 1, MAP: 8, PIP: 24      LABS:                        8.2    6.81  )-----------( 190      ( 15 Sep 2021 07:40 )             26.8     09-15    141  |  111<H>  |  21  ----------------------------<  130<H>  3.7   |  19<L>  |  1.00    Ca    7.0<L>      15 Sep 2021 07:10    TPro  5.1<L>  /  Alb  1.7<L>  /  TBili  0.2  /  DBili  x   /  AST  21  /  ALT  23  /  AlkPhos  55  09-15              WBC:  WBC Count: 6.81 K/uL (09-15 @ 07:40)  WBC Count: 6.36 K/uL (09-14 @ 21:37)  WBC Count: 5.08 K/uL (09-14 @ 06:46)  WBC Count: 6.38 K/uL (09-13 @ 06:30)  WBC Count: 6.84 K/uL (09-12 @ 10:12)  WBC Count: 6.08 K/uL (09-12 @ 06:48)      MICROBIOLOGY:  RECENT CULTURES:  09-08 .Sputum Sputum Serratia marcescens  Pseudomonas aeruginosa   Moderate polymorphonuclear leukocytes per low power field  Few Squamous epithelial cells per low power field  Moderate Gram Positive Rods per oil power field  Moderate Gram Negative Rods per oil power field  Few Yeast like cells per oil power field  Moderate Gram Negative Diplococci per oil power field   Numerous Serratia marcescens  Numerous Pseudomonas aeruginosa  Normal Respiratory Elvira present                    Sodium:  Sodium, Serum: 141 mmol/L (09-15 @ 07:10)  Sodium, Serum: 139 mmol/L (09-14 @ 06:46)  Sodium, Serum: 140 mmol/L (09-13 @ 06:30)  Sodium, Serum: 141 mmol/L (09-12 @ 06:48)      1.00 mg/dL 09-15 @ 07:10  1.20 mg/dL 09-14 @ 06:46  0.99 mg/dL 09-13 @ 06:30  0.96 mg/dL 09-12 @ 06:48      Hemoglobin:  Hemoglobin: 8.2 g/dL (09-15 @ 07:40)  Hemoglobin: 8.7 g/dL (09-14 @ 21:37)  Hemoglobin: 6.5 g/dL (09-14 @ 06:46)  Hemoglobin: 7.3 g/dL (09-13 @ 06:30)  Hemoglobin: 7.2 g/dL (09-12 @ 10:12)  Hemoglobin: 7.1 g/dL (09-12 @ 06:48)      Platelets: Platelet Count - Automated: 190 K/uL (09-15 @ 07:40)  Platelet Count - Automated: 214 K/uL (09-14 @ 21:37)  Platelet Count - Automated: 210 K/uL (09-14 @ 06:46)  Platelet Count - Automated: 227 K/uL (09-13 @ 06:30)  Platelet Count - Automated: 208 K/uL (09-12 @ 10:12)  Platelet Count - Automated: 212 K/uL (09-12 @ 06:48)      LIVER FUNCTIONS - ( 15 Sep 2021 07:10 )  Alb: 1.7 g/dL / Pro: 5.1 g/dL / ALK PHOS: 55 U/L / ALT: 23 U/L DA / AST: 21 U/L / GGT: x                 RADIOLOGY & ADDITIONAL STUDIES:      MICROBIOLOGY:  RECENT CULTURES:  09-08 .Sputum Sputum Serratia marcescens  Pseudomonas aeruginosa   Moderate polymorphonuclear leukocytes per low power field  Few Squamous epithelial cells per low power field  Moderate Gram Positive Rods per oil power field  Moderate Gram Negative Rods per oil power field  Few Yeast like cells per oil power field  Moderate Gram Negative Diplococci per oil power field   Numerous Serratia marcescens  Numerous Pseudomonas aeruginosa  Normal Respiratory Elvira present

## 2021-09-15 NOTE — PROGRESS NOTE ADULT - ASSESSMENT
74 y/o f trach dependent, with PEG tube, DM, HTN, GERD, CVA, dementia, constipation p/w being found unresponsive at [facility], brought in by EMS for post cardiac arrest care.  Noted to be febrile with leukocytosis and bandemia.    RECOMMENDATIONS    Sepsis - pt has vaguhn, chronic vent support with trach, unclear localization.     rec continue ZOSYN (started 9/7) with recs to follow but high risk for PNA, sp 5 days so 9/11 as last day     diabetic ulceration distal aspect hallux Left foot/ingrown left hallux nail   pt is MRSA PCR neg, has been on Zosyn so some concerns regarding this being pressure related. Acutely developed so suggest mostly likely superficial  podiatry involved  sp linezolid 600mg PO BID x 5 days  so last day 9/14 9/13-pt with rapid shallow breathing, temp elevation but no true fever, off abx except for linezolid for toe,  9/14-continue current Rx    anemia and electrolyte management per primary [facility] physician    We will follow along in the care of this patient. Please call us at [phone] or text me directly on my cell# at [phone] with any concerns.

## 2021-09-15 NOTE — PROGRESS NOTE ADULT - ASSESSMENT
VIRIDIANAAMI BREA 75 f N[facility] P 9/7/2021   DR ILIANA KEMP     REVIEW OF SYMPTOMS      Able to give (reliable) ROS  NO     PHYSICAL EXAM    HEENT Unremarkable  atraumatic   RESP Fair air entry EXP prolonged    Harsh breath sound Resp distres mild   CARDIAC S1 S2 No S3     NO JVD    ABDOMEN SOFT BS PRESENT NOT DISTENDED No hepatosplenomegaly   PEDAL EDEMA present No calf tenderness  NO rash         PATIENT PRESENTATION.    74 f PMH OBS cva chr vdrf peg past ho vap past ho pseudomonas sp cc admitted 9/7 sp cac asp pneu   Rxed with abio  Shock and lacticemia poa resolvd   Had sz 9/9 Noted to have anemia   Has toe lesion seen by podiatry 9/10     HOSPITAL COURSE.  SP CAC C monitor C enz Cardio on case    VENT Vent bndle  ASP PNEUM completed zosyn 5 d course   DROP IN HB 9/8/2021 Likely sec fluids gi on case no ongoing gi bl suspectd no egd plannd    SZ on anticonvulsants (9/9/2021)   TOES lesion noted 9/10/2021 seen by podiatry 9/11/2021   DVT ppplx    BEST PRACTICE ISSUES.                                                  HEAD OF BED ELEVATION. Yes  DVT PROPHYLAXIS.  lvnx 30 (9/7/2021) -> hpsc (9/7) -> dced 9/8 drop in hb        -> lvnx 30                                 SQUIRES PROPHYLAXIS.   protonix 40 (9/7/2021)       carafate 1.4 (9/7/2021)                                                                                   DIET.     npo 9/7/2021  -> glucerna 1.5 1200 (9/7)   -> 2cal 800 (9/9)                   INFECTION PROPHYLAXIS.           chlorhexidine 2% (9/8)            chlorhexidine .12 9/9/2021     GENERAL ISSUES  GOC ADVANCED DIRECTIVE.                                         ALLERGY.                            WEIGHT.         9/7/2021 130                           BMI.                   9/7/2021  22      PATIENT DATA   TUBES  PROCEDURES.      9/7/2021 Attempted by er md andrews and r subclav unsuccessful   9/7/2021 IO   9/7/2021 vaughn  9/8/2021 C line darryl  ccpa   9/9/2021 vaughn dced     PEG poa  TRACH poa     ABG.     VITALS/IO/VENT/DRIPS.   9/15/2021 afeb 75 116/50   9/14/2021 74 110/50   9/14/2021 ac 14/400/5/.3     PROBLEM ASSESSMENT/RECOMMENDATIONS.    COVID PROFILE.  No current covid infection    SP CAC AT [facility] FEW MIN 9/7/2021.   there was low grade troponin elevation on admission which down trended  and was likely demand ischemia   Opens eyes     POSSIBLE ASP PNEUMONIA poa.  9/7 ct basal as neum distended esophagus   w 9/11-9/13/2021 w 7.5 - 6.3  pr 9/11/2021 pr 2.3   mrsa 9/8/2021 (-)   blod c 9/7 (-)   legn 9/7 (-)   urine 9/7 proteus   sputum 9/8 serratia pseudomonas   linezolid 9/10/2021 x 5d (Dr Mcpherson) (paronychia)     TRACH POA.     VENT DEPENENT POA.   Target pH 730 (+) PO2 60 (+) po 90-95% Pplat 35 (-)   HOBE DSV DSBT  9/8/2021 No abg available resp could not get abg     COPD.  duoneb (9/7/2021)     RO MI.   Tr 9/7-9/8/2021 tr .17- .091   ASA 81 (9/7/2021)   metoprolol 25.2 (9/7/2021)     RO CHF.   bnp 9/7/2021 bnp 2391   echo 6/15/2021 n lvsf dd1     DROP IN HB 9/14/2021.  9/13-9/14-9/15 Hb 7.3-6.5-9.3   ctap 9/14/2021 (-)     TRANSFUSION  9/14/2021 1 u prbc     PEG POA.    GERD.  9/7/2021 ct cap gerd   protonix 40  9/7/2021)                                             ELEVATED LFTS POA.  LFTS 9/7/2021  ap 122  ast 205  alt 81       TIME SPENT   Over 36 minutes aggregate critical care time spent on encounter; activities included   direct patient care, counseling and/or coordinating care reviewing notes, lab data/ imaging , discussion with multidisciplinary team/ patient  /family and explaining in detail risks, benefits, alternatives  of the recommendations     CHAPINCITO GUIDRY 75 f [facility] P 9/7/2021   DR ILIANA KEMP

## 2021-09-15 NOTE — PROGRESS NOTE ADULT - PROBLEM SELECTOR PROBLEM 4
Anemia due to acute blood loss
R/O Diabetes
Anemia due to acute blood loss

## 2021-09-15 NOTE — PROGRESS NOTE ADULT - PROBLEM SELECTOR PLAN 5
Neuro eval appreciated - these events were fully evaluated with EEG in past - they are due to longstanding GI Issues  patient contorts herself when her abdomen is uncomfortable or needs to have a BM.   No further neurological intervention needed. continue bowel regimen
Neuro eval appreciated - these events were fully evaluated with EEG in past - they are due to longstanding GI Issues  patient contorts herself when her abdomen is uncomfortable or needs to have a BM.    No further neurological intervention needed. continue bowel regimen
discussed with EICU and Neuro  she has had this in the past (see my note and neuro consult from 2019)   however, it has increased in frequency and intensity  received a total of Ativan 4mg and is now calm  start Depakote load  full neuro eval to follow
Neuro eval appreciated - these events were fully evaluated with EEG in past - they are due to longstanding GI Issues  patient contorts herself when her abdomen is uncomfortable or needs to have a BM.   No further neurological intervention needed. continue bowel regimen
cont metoprolol
Neuro eval appreciated - these events were fully evaluated with EEG in past - they are due to longstanding GI Issues  patient contorts herself when her abdomen is uncomfortable or needs to have a BM.    No further neurological intervention needed. continue bowel regimen

## 2021-09-15 NOTE — PROGRESS NOTE ADULT - SUBJECTIVE AND OBJECTIVE BOX
Date/Time Patient Seen:  		  Referring MD:   Data Reviewed	       Patient is a 75y old  Female who presents with a chief complaint of Unresponsive, post-cardiac arrest (14 Sep 2021 18:59)      Subjective/HPI     PAST MEDICAL & SURGICAL HISTORY:  Dementia of frontal lobe type    Aphasic stroke    Diabetes mellitus    Respiratory failure    Hypertension    GERD (gastroesophageal reflux disease)    Constipation    Respiratory failure    CVA (cerebral vascular accident)    HTN (hypertension)    DM (diabetes mellitus)    Advanced dementia    COVID-19 virus detected    Quadriplegia    Pneumonia    Type II diabetes mellitus    Hx of appendectomy    Gastrostomy in place    Tracheostomy in place    Tracheostomy tube present    Feeding by G-tube          Medication list         MEDICATIONS  (STANDING):  albuterol/ipratropium for Nebulization 3 milliLiter(s) Nebulizer every 6 hours  aspirin  chewable 81 milliGRAM(s) Enteral Tube daily  bisacodyl Suppository 10 milliGRAM(s) Rectal at bedtime  chlorhexidine 0.12% Liquid 15 milliLiter(s) Oral Mucosa two times a day  chlorhexidine 2% Cloths 1 Application(s) Topical daily  dextrose 40% Gel 15 Gram(s) Oral once  dextrose 5%. 1000 milliLiter(s) (50 mL/Hr) IV Continuous <Continuous>  dextrose 5%. 1000 milliLiter(s) (100 mL/Hr) IV Continuous <Continuous>  dextrose 50% Injectable 25 Gram(s) IV Push once  dextrose 50% Injectable 12.5 Gram(s) IV Push once  dextrose 50% Injectable 25 Gram(s) IV Push once  glucagon  Injectable 1 milliGRAM(s) IntraMuscular once  insulin glargine Injectable (LANTUS) 36 Unit(s) SubCutaneous at bedtime  insulin lispro (ADMELOG) corrective regimen sliding scale   SubCutaneous every 6 hours  lactated ringers. 1000 milliLiter(s) (75 mL/Hr) IV Continuous <Continuous>  lactobacillus acidophilus 2 Tablet(s) Oral daily  LORazepam     Tablet 1 milliGRAM(s) Oral every 6 hours  methocarbamol 250 milliGRAM(s) Oral daily  pantoprazole   Suspension 40 milliGRAM(s) Enteral Tube two times a day  polyethylene glycol 3350 17 Gram(s) Oral daily  senna 2 Tablet(s) Oral at bedtime  simethicone 80 milliGRAM(s) Chew every 6 hours  sucralfate suspension 1 Gram(s) Oral four times a day    MEDICATIONS  (PRN):  acetaminophen   Tablet .. 650 milliGRAM(s) Oral every 6 hours PRN Temp greater or equal to 38C (100.4F), Mild Pain (1 - 3)  LORazepam     Tablet 0.5 milliGRAM(s) Oral every 2 hours PRN Agitation  sodium chloride 0.9% lock flush 10 milliLiter(s) IV Push every 1 hour PRN Pre/post blood products, medications, blood draw, and to maintain line patency         Vitals log        ICU Vital Signs Last 24 Hrs  T(C): 37.1 (15 Sep 2021 04:00), Max: 37.1 (14 Sep 2021 21:29)  T(F): 98.7 (15 Sep 2021 04:00), Max: 98.8 (14 Sep 2021 21:29)  HR: 105 (15 Sep 2021 05:25) (59 - 105)  BP: 119/48 (15 Sep 2021 04:06) (95/49 - 120/52)  BP(mean): 68 (15 Sep 2021 04:06) (57 - 79)  ABP: --  ABP(mean): --  RR: 34 (15 Sep 2021 04:06) (14 - 34)  SpO2: 99% (15 Sep 2021 05:25) (96% - 100%)       Mode: AC/ CMV (Assist Control/ Continuous Mandatory Ventilation)  RR (machine): 14  TV (machine): 400  FiO2: 30  PEEP: 5  ITime: 1  MAP: 7  PIP: 16      Input and Output:  I&O's Detail    13 Sep 2021 07:01  -  14 Sep 2021 07:00  --------------------------------------------------------  IN:    Enteral Tube Flush: 440 mL    Free Water: 600 mL    Jevity 1.5: 840 mL    TwoCal HN: 240 mL  Total IN: 2120 mL    OUT:    Voided (mL): 350 mL  Total OUT: 350 mL    Total NET: 1770 mL      14 Sep 2021 07:01  -  15 Sep 2021 06:35  --------------------------------------------------------  IN:    Free Water: 600 mL    Jevity 1.5: 600 mL    Lactated Ringers: 750 mL    PRBCs (Packed Red Blood Cells): 283 mL    Vital1.5: 950 mL  Total IN: 3183 mL    OUT:    Voided (mL): 250 mL  Total OUT: 250 mL    Total NET: 2933 mL          Lab Data                        8.7    6.36  )-----------( 214      ( 14 Sep 2021 21:37 )             28.5     09-14    139  |  106  |  23  ----------------------------<  188<H>  4.1   |  23  |  1.20    Ca    8.6      14 Sep 2021 06:46    TPro  6.1  /  Alb  2.1<L>  /  TBili  0.3  /  DBili  x   /  AST  25  /  ALT  30  /  AlkPhos  76  09-14            Review of Systems	      Objective     Physical Examination    heart s1s2  lung dec BS  abd soft      Pertinent Lab findings & Imaging      Annalise:  NO   Adequate UO     I&O's Detail    13 Sep 2021 07:01  -  14 Sep 2021 07:00  --------------------------------------------------------  IN:    Enteral Tube Flush: 440 mL    Free Water: 600 mL    Jevity 1.5: 840 mL    TwoCal HN: 240 mL  Total IN: 2120 mL    OUT:    Voided (mL): 350 mL  Total OUT: 350 mL    Total NET: 1770 mL      14 Sep 2021 07:01  -  15 Sep 2021 06:35  --------------------------------------------------------  IN:    Free Water: 600 mL    Jevity 1.5: 600 mL    Lactated Ringers: 750 mL    PRBCs (Packed Red Blood Cells): 283 mL    Vital1.5: 950 mL  Total IN: 3183 mL    OUT:    Voided (mL): 250 mL  Total OUT: 250 mL    Total NET: 2933 mL               Discussed with:     Cultures:	        Radiology

## 2021-09-15 NOTE — PROGRESS NOTE ADULT - ASSESSMENT
- cardiac arrest/pneumonia    anemia, guaiac +     Lft elevatoin     proctitis w/ colonic ileus (mild)      reflux      Can use Anticoagulation if indicated- monitor stools  daily acid suppression     Lft normalized, do not need to trend daily.  discharge planning

## 2021-09-15 NOTE — PROGRESS NOTE ADULT - SUBJECTIVE AND OBJECTIVE BOX
[facility] DIVISION of INFECTIOUS DISEASE  Arturo Olivas MD PhD, Haylee Umanzor MD, Andressa Brantley MD, Billy Valenzuela MD  and providing coverage with Alexa Canas MD and Eusebio Chairez MD  Providing Infectious Disease Consultations at [facility], [facility], [facility]'s    Office# [phone] to schedule follow up appointments  Answering Service for urgent calls or New Consults [phone]  Cell# to text for urgent issues Arturo Olivas [phone]     infectious diseases progress note:    BREA BECKHAM is a 75y y. o. Female patient    Another episode of agitation this am but pt calm after dialudid    Allergies    codeine (Hives)    Intolerances        ANTIBIOTICS/RELEVANT:  antimicrobials    immunologic:    OTHER:  acetaminophen   Tablet .. 650 milliGRAM(s) Oral every 6 hours PRN  albuterol/ipratropium for Nebulization 3 milliLiter(s) Nebulizer every 6 hours  aspirin  chewable 81 milliGRAM(s) Enteral Tube daily  bisacodyl Suppository 10 milliGRAM(s) Rectal at bedtime  chlorhexidine 0.12% Liquid 15 milliLiter(s) Oral Mucosa two times a day  chlorhexidine 2% Cloths 1 Application(s) Topical daily  dextrose 40% Gel 15 Gram(s) Oral once  dextrose 5%. 1000 milliLiter(s) IV Continuous <Continuous>  dextrose 5%. 1000 milliLiter(s) IV Continuous <Continuous>  dextrose 50% Injectable 25 Gram(s) IV Push once  dextrose 50% Injectable 12.5 Gram(s) IV Push once  dextrose 50% Injectable 25 Gram(s) IV Push once  glucagon  Injectable 1 milliGRAM(s) IntraMuscular once  insulin glargine Injectable (LANTUS) 36 Unit(s) SubCutaneous at bedtime  insulin lispro (ADMELOG) corrective regimen sliding scale   SubCutaneous every 6 hours  lactated ringers. 1000 milliLiter(s) IV Continuous <Continuous>  lactobacillus acidophilus 2 Tablet(s) Oral daily  LORazepam     Tablet 1 milliGRAM(s) Oral every 6 hours  LORazepam     Tablet 0.5 milliGRAM(s) Oral every 2 hours PRN  methocarbamol 250 milliGRAM(s) Oral daily  pantoprazole   Suspension 40 milliGRAM(s) Enteral Tube two times a day  polyethylene glycol 3350 17 Gram(s) Oral daily  senna 2 Tablet(s) Oral at bedtime  simethicone 80 milliGRAM(s) Chew every 6 hours  sodium chloride 0.9% lock flush 10 milliLiter(s) IV Push every 1 hour PRN  sucralfate suspension 1 Gram(s) Oral four times a day      Objective:  Vital Signs Last 24 Hrs  T(C): 36.8 (15 Sep 2021 08:45), Max: 37.1 (14 Sep 2021 21:29)  T(F): 98.3 (15 Sep 2021 08:45), Max: 98.8 (14 Sep 2021 21:29)  HR: 69 (15 Sep 2021 08:00) (59 - 105)  BP: 111/52 (15 Sep 2021 08:00) (81/44 - 120/52)  BP(mean): 70 (15 Sep 2021 08:00) (57 - 79)  RR: 16 (15 Sep 2021 08:00) (13 - 34)  SpO2: 100% (15 Sep 2021 08:00) (96% - 100%)    T(C): 36.8 (09-15-21 @ 08:45), Max: 37.3 (09-13-21 @ 12:00)  T(C): 36.8 (09-15-21 @ 08:45), Max: 37.8 (09-13-21 @ 08:30)  T(C): 36.8 (09-15-21 @ 08:45), Max: 37.8 (09-13-21 @ 08:30)    PHYSICAL EXAM:  HEENT: NC atraumatic  Neck: supple, on vent via trach  Respiratory: no accessory muscle use, breathing comfortably  Cardiovascular: distant  Gastrointestinal: normal appearing, nondistended  Extremities: no clubbing, no cyanosis,    Mode: AC/ CMV (Assist Control/ Continuous Mandatory Ventilation), RR (machine): 14, TV (machine): 400, FiO2: 30, PEEP: 5, ITime: 1, MAP: 8, PIP: 24    LABS:                          8.2    6.81  )-----------( 190      ( 15 Sep 2021 07:40 )             26.8       6.81 09-15 @ 07:40  6.36 09-14 @ 21:37  5.08 09-14 @ 06:46  6.38 09-13 @ 06:30  6.84 09-12 @ 10:12  6.08 09-12 @ 06:48  7.52 09-11 @ 07:30  5.50 09-10 @ 08:17  7.69 09-09 @ 15:06  4.84 09-09 @ 07:04  7.65 09-08 @ 09:03      09-15    141  |  111<H>  |  21  ----------------------------<  130<H>  3.7   |  19<L>  |  1.00    Ca    7.0<L>      15 Sep 2021 07:10    TPro  5.1<L>  /  Alb  1.7<L>  /  TBili  0.2  /  DBili  x   /  AST  21  /  ALT  23  /  AlkPhos  55  09-15      Creatinine, Serum: 1.00 mg/dL (09-15-21 @ 07:10)  Creatinine, Serum: 1.20 mg/dL (09-14-21 @ 06:46)  Creatinine, Serum: 0.99 mg/dL (09-13-21 @ 06:30)  Creatinine, Serum: 0.96 mg/dL (09-12-21 @ 06:48)  Creatinine, Serum: 1.12 mg/dL (09-11-21 @ 07:30)  Creatinine, Serum: 1.08 mg/dL (09-10-21 @ 08:17)  Creatinine, Serum: 1.24 mg/dL (09-09-21 @ 07:04)                INFLAMMATORY MARKERS  Auto Neutrophil #: 2.92 K/uL (09-14-21 @ 06:46)  Auto Lymphocyte #: 1.43 K/uL (09-14-21 @ 06:46)  Auto Neutrophil #: 22.22 K/uL (09-07-21 @ 10:38)  Auto Lymphocyte #: 2.65 K/uL (09-07-21 @ 10:38)    Lactate, Blood: 1.0 mmol/L (09-08-21 @ 06:55)  Lactate, Blood: 1.1 mmol/L (09-07-21 @ 20:59)  Lactate, Blood: 3.7 mmol/L (09-07-21 @ 14:27)  Lactate, Blood: 13.5 mmol/L (09-07-21 @ 10:38)    Auto Eosinophil #: 0.09 K/uL (09-14-21 @ 06:46)  Auto Eosinophil #: 0.00 K/uL (09-07-21 @ 10:38)        Procalcitonin, Serum: 0.90 ng/mL (09-13-21 @ 13:45)  Procalcitonin, Serum: 2.13 ng/mL (09-11-21 @ 11:44)  Procalcitonin, Serum: 7.69 ng/mL (09-08-21 @ 13:26)    Troponin I, Serum: .035 ng/mL (09-09-21 @ 07:04)  Troponin I, Serum: .091 ng/mL (09-08-21 @ 06:55)  Troponin I, Serum: .123 ng/mL (09-07-21 @ 20:59)  Troponin I, Serum: .127 ng/mL (09-07-21 @ 10:38)          Ferritin, Serum: 152 ng/mL (09-09-21 @ 18:50)        INR: 1.09 ratio (09-13-21 @ 06:30)  INR: 1.05 ratio (09-11-21 @ 07:30)  INR: 1.03 ratio (09-09-21 @ 07:04)  INR: 1.03 ratio (09-08-21 @ 06:55)  Activated Partial Thromboplastin Time: 41.4 sec (09-07-21 @ 10:38)  INR: 1.03 ratio (09-07-21 @ 10:38)          MICROBIOLOGY:              RADIOLOGY & ADDITIONAL STUDIES:

## 2021-09-15 NOTE — PROGRESS NOTE ADULT - PROBLEM SELECTOR PLAN 6
Chronic ileus  continue laxatives  now on trial of vital to see if patient tolerates better.
cont protonix
conitnue laxatives  Continue to monitor  Did not tolerate Glucerna volume - seems to be doing better twocal
conitnue laxatives  Did not tolerate Glucerna volume - overall better on twocal but having lots of "episodes"  now on trial of vital to see if patient tolerates better.
conitnue laxatives  Continue to monitor  Did not tolerate Glucerna volume - seems to be doing better twocal
resolved with laxatives  Continue to monitor  Did not tolerate Glucerna volume - will trial TwoCal
conitnue laxatives  Did not tolerate Glucerna volume - overall better on twocal but having lots of "episodes"  trial of vital to see if patient tolerates better.
conitnue laxatives  Continue to monitor  Did not tolerate Glucerna volume - seems to be doing better twocal

## 2021-09-15 NOTE — DISCHARGE NOTE PROVIDER - NSDCCPCAREPLAN_GEN_ALL_CORE_FT
PRINCIPAL DISCHARGE DIAGNOSIS  Diagnosis: Sepsis  Assessment and Plan of Treatment: resolved      SECONDARY DISCHARGE DIAGNOSES  Diagnosis: Cardiac arrest  Assessment and Plan of Treatment: resolved    Diagnosis: RYAN (acute kidney injury)  Assessment and Plan of Treatment: resolved    Diagnosis: Anemia due to acute blood loss  Assessment and Plan of Treatment: PPI BID, s/p iron repletion and 1 unit prbc.  monitor as per MD.    Diagnosis: Ileus  Assessment and Plan of Treatment: chronic - continue bowel regimen including soap suds enemas.    Diagnosis: Diabetic foot ulcer  Assessment and Plan of Treatment: podiatry follow up    Diagnosis: Pneumonia, aspiration  Assessment and Plan of Treatment: resolved

## 2021-09-15 NOTE — PROVIDER CONTACT NOTE (OTHER) - ACTION/TREATMENT ORDERED:
Dr. Stafford and Dr. Camejo aware Pt having involuntary jerky movement and sweating.  Additional Ativan 1mg PO given through PEG.  Dr. Camejo ok to discharge pt.
Dr Camejo made aware, Ativan IVP 4mg total given, and Depakote IV also given. Patient now calm and resting comfortably, will continue to monitor.

## 2021-09-15 NOTE — PROGRESS NOTE ADULT - PROBLEM SELECTOR PROBLEM 1
Diabetic foot ulcer
Cardiac arrest
R/O Cardiac arrest
Cardiac arrest

## 2021-09-15 NOTE — PROGRESS NOTE ADULT - PROBLEM SELECTOR PLAN 3
Continue Lantus  Change coverage scale to start coverage at 150  continue to monitor FS q6 hours
Continue Lantus  moderate coverage scale q6h  continue to monitor FS q6 hours
Pt has mild colonic ileus  - pt has PEG tube, GI consulted - they recommended may start feeds by peg tube, and protonix IV bid
Continue Lantus  Change coverage scale to start coverage at 150  continue to monitor FS q6 hours
Continue Lantus  moderate coverage scale q6h  continue to monitor FS q6 hours
Continue Lantus  Change coverage scale to start coverage at 150  continue to monitor FS q6 hours

## 2021-09-21 NOTE — ED ADULT NURSE REASSESSMENT NOTE - GASTROINTESTINAL WDL
Hoda Boston is a 53 year old female presenting for   Chief Complaint   Patient presents with   • Follow-up     9/22/20 - bilateral leg edema, pain in both lower extremities, varicose veins of both lower extremities with complications, venous stasis dermatitis of both lower extremities, neurodermatitis      Denies Latex allergy or sensitivity.    Medication verified, no changes  Refills needed today: No    Patient would like communication of their results via:      Home Phone: [phone] (home)  Okay to leave a message containing results? Yes   Abdomen soft, nontender, nondistended, bowel sounds present in all 4 quadrants. g tube in place

## 2021-09-25 ENCOUNTER — INPATIENT (INPATIENT)
Facility: HOSPITAL | Age: 76
LOS: 8 days | Discharge: NURSING, REHAB OR LONG-TERM CARE | DRG: 870 | End: 2021-10-04
Attending: INTERNAL MEDICINE | Admitting: INTERNAL MEDICINE
Payer: MEDICARE

## 2021-09-25 VITALS — HEART RATE: 114 BPM | OXYGEN SATURATION: 96 %

## 2021-09-25 DIAGNOSIS — R53.2 FUNCTIONAL QUADRIPLEGIA: ICD-10-CM

## 2021-09-25 DIAGNOSIS — E83.41 HYPERMAGNESEMIA: ICD-10-CM

## 2021-09-25 DIAGNOSIS — Z29.9 ENCOUNTER FOR PROPHYLACTIC MEASURES, UNSPECIFIED: ICD-10-CM

## 2021-09-25 DIAGNOSIS — A41.9 SEPSIS, UNSPECIFIED ORGANISM: ICD-10-CM

## 2021-09-25 DIAGNOSIS — J96.21 ACUTE AND CHRONIC RESPIRATORY FAILURE WITH HYPOXIA: ICD-10-CM

## 2021-09-25 DIAGNOSIS — E11.9 TYPE 2 DIABETES MELLITUS WITHOUT COMPLICATIONS: ICD-10-CM

## 2021-09-25 DIAGNOSIS — J69.0 PNEUMONITIS DUE TO INHALATION OF FOOD AND VOMIT: ICD-10-CM

## 2021-09-25 DIAGNOSIS — N39.0 URINARY TRACT INFECTION, SITE NOT SPECIFIED: ICD-10-CM

## 2021-09-25 DIAGNOSIS — N17.9 ACUTE KIDNEY FAILURE, UNSPECIFIED: ICD-10-CM

## 2021-09-25 DIAGNOSIS — Z93.1 GASTROSTOMY STATUS: Chronic | ICD-10-CM

## 2021-09-25 DIAGNOSIS — A41.89 OTHER SPECIFIED SEPSIS: ICD-10-CM

## 2021-09-25 DIAGNOSIS — Z93.0 TRACHEOSTOMY STATUS: Chronic | ICD-10-CM

## 2021-09-25 LAB
ALBUMIN SERPL ELPH-MCNC: 2.5 G/DL — LOW (ref 3.3–5)
ALP SERPL-CCNC: 170 U/L — HIGH (ref 30–120)
ALT FLD-CCNC: 33 U/L DA — SIGNIFICANT CHANGE UP (ref 10–60)
ANION GAP SERPL CALC-SCNC: 9 MMOL/L — SIGNIFICANT CHANGE UP (ref 5–17)
APPEARANCE UR: ABNORMAL
APTT BLD: 45 SEC — HIGH (ref 27.5–35.5)
AST SERPL-CCNC: 41 U/L — HIGH (ref 10–40)
BASOPHILS # BLD AUTO: 0.06 K/UL — SIGNIFICANT CHANGE UP (ref 0–0.2)
BASOPHILS NFR BLD AUTO: 0.4 % — SIGNIFICANT CHANGE UP (ref 0–2)
BILIRUB SERPL-MCNC: 0.5 MG/DL — SIGNIFICANT CHANGE UP (ref 0.2–1.2)
BILIRUB UR-MCNC: NEGATIVE — SIGNIFICANT CHANGE UP
BUN SERPL-MCNC: 52 MG/DL — HIGH (ref 7–23)
CALCIUM SERPL-MCNC: 9.7 MG/DL — SIGNIFICANT CHANGE UP (ref 8.4–10.5)
CHLORIDE SERPL-SCNC: 100 MMOL/L — SIGNIFICANT CHANGE UP (ref 96–108)
CO2 SERPL-SCNC: 28 MMOL/L — SIGNIFICANT CHANGE UP (ref 22–31)
COLOR SPEC: YELLOW — SIGNIFICANT CHANGE UP
CREAT SERPL-MCNC: 1.31 MG/DL — HIGH (ref 0.5–1.3)
DIFF PNL FLD: ABNORMAL
EOSINOPHIL # BLD AUTO: 0.08 K/UL — SIGNIFICANT CHANGE UP (ref 0–0.5)
EOSINOPHIL NFR BLD AUTO: 0.6 % — SIGNIFICANT CHANGE UP (ref 0–6)
GLUCOSE SERPL-MCNC: 297 MG/DL — HIGH (ref 70–99)
GLUCOSE UR QL: NEGATIVE MG/DL — SIGNIFICANT CHANGE UP
HCT VFR BLD CALC: 35 % — SIGNIFICANT CHANGE UP (ref 34.5–45)
HGB BLD-MCNC: 10.4 G/DL — LOW (ref 11.5–15.5)
IMM GRANULOCYTES NFR BLD AUTO: 2.4 % — HIGH (ref 0–1.5)
INR BLD: 1.18 RATIO — HIGH (ref 0.88–1.16)
KETONES UR-MCNC: ABNORMAL
LACTATE SERPL-SCNC: 1.4 MMOL/L — SIGNIFICANT CHANGE UP (ref 0.7–2)
LACTATE SERPL-SCNC: 5.8 MMOL/L — CRITICAL HIGH (ref 0.7–2)
LEUKOCYTE ESTERASE UR-ACNC: ABNORMAL
LIDOCAIN IGE QN: 69 U/L — LOW (ref 73–393)
LYMPHOCYTES # BLD AUTO: 1.99 K/UL — SIGNIFICANT CHANGE UP (ref 1–3.3)
LYMPHOCYTES # BLD AUTO: 14.6 % — SIGNIFICANT CHANGE UP (ref 13–44)
MAGNESIUM SERPL-MCNC: 4.3 MG/DL — HIGH (ref 1.6–2.6)
MCHC RBC-ENTMCNC: 27.4 PG — SIGNIFICANT CHANGE UP (ref 27–34)
MCHC RBC-ENTMCNC: 29.7 GM/DL — LOW (ref 32–36)
MCV RBC AUTO: 92.1 FL — SIGNIFICANT CHANGE UP (ref 80–100)
MONOCYTES # BLD AUTO: 0.93 K/UL — HIGH (ref 0–0.9)
MONOCYTES NFR BLD AUTO: 6.8 % — SIGNIFICANT CHANGE UP (ref 2–14)
NEUTROPHILS # BLD AUTO: 10.22 K/UL — HIGH (ref 1.8–7.4)
NEUTROPHILS NFR BLD AUTO: 75.2 % — SIGNIFICANT CHANGE UP (ref 43–77)
NITRITE UR-MCNC: NEGATIVE — SIGNIFICANT CHANGE UP
NRBC # BLD: 0 /100 WBCS — SIGNIFICANT CHANGE UP (ref 0–0)
PH UR: 8 — SIGNIFICANT CHANGE UP (ref 5–8)
PHOSPHATE SERPL-MCNC: 4.5 MG/DL — SIGNIFICANT CHANGE UP (ref 2.5–4.5)
PLATELET # BLD AUTO: 435 K/UL — HIGH (ref 150–400)
POTASSIUM SERPL-MCNC: 5.3 MMOL/L — SIGNIFICANT CHANGE UP (ref 3.5–5.3)
POTASSIUM SERPL-SCNC: 5.3 MMOL/L — SIGNIFICANT CHANGE UP (ref 3.5–5.3)
PROT SERPL-MCNC: 8.6 G/DL — HIGH (ref 6–8.3)
PROT UR-MCNC: 100 MG/DL
PROTHROM AB SERPL-ACNC: 14.2 SEC — HIGH (ref 10.6–13.6)
RAPID RVP RESULT: SIGNIFICANT CHANGE UP
RBC # BLD: 3.8 M/UL — SIGNIFICANT CHANGE UP (ref 3.8–5.2)
RBC # FLD: 18.5 % — HIGH (ref 10.3–14.5)
SARS-COV-2 RNA SPEC QL NAA+PROBE: SIGNIFICANT CHANGE UP
SODIUM SERPL-SCNC: 137 MMOL/L — SIGNIFICANT CHANGE UP (ref 135–145)
SP GR SPEC: 1.01 — SIGNIFICANT CHANGE UP (ref 1.01–1.02)
UROBILINOGEN FLD QL: NEGATIVE MG/DL — SIGNIFICANT CHANGE UP
WBC # BLD: 13.61 K/UL — HIGH (ref 3.8–10.5)
WBC # FLD AUTO: 13.61 K/UL — HIGH (ref 3.8–10.5)

## 2021-09-25 PROCEDURE — 71045 X-RAY EXAM CHEST 1 VIEW: CPT | Mod: 26

## 2021-09-25 PROCEDURE — 71250 CT THORAX DX C-: CPT | Mod: 26,MA

## 2021-09-25 PROCEDURE — 99285 EMERGENCY DEPT VISIT HI MDM: CPT

## 2021-09-25 PROCEDURE — 74176 CT ABD & PELVIS W/O CONTRAST: CPT | Mod: 26,MA

## 2021-09-25 PROCEDURE — 99223 1ST HOSP IP/OBS HIGH 75: CPT | Mod: AI

## 2021-09-25 RX ORDER — ALBUTEROL 90 UG/1
1 AEROSOL, METERED ORAL EVERY 6 HOURS
Refills: 0 | Status: COMPLETED | OUTPATIENT
Start: 2021-09-25 | End: 2022-08-24

## 2021-09-25 RX ORDER — IPRATROPIUM/ALBUTEROL SULFATE 18-103MCG
3 AEROSOL WITH ADAPTER (GRAM) INHALATION EVERY 6 HOURS
Refills: 0 | Status: DISCONTINUED | OUTPATIENT
Start: 2021-09-25 | End: 2021-09-25

## 2021-09-25 RX ORDER — POLYETHYLENE GLYCOL 3350 17 G/17G
17 POWDER, FOR SOLUTION ORAL EVERY 12 HOURS
Refills: 0 | Status: DISCONTINUED | OUTPATIENT
Start: 2021-09-25 | End: 2021-10-04

## 2021-09-25 RX ORDER — METHOCARBAMOL 500 MG/1
250 TABLET, FILM COATED ORAL
Refills: 0 | Status: DISCONTINUED | OUTPATIENT
Start: 2021-09-25 | End: 2021-10-04

## 2021-09-25 RX ORDER — DEXTROSE 50 % IN WATER 50 %
25 SYRINGE (ML) INTRAVENOUS ONCE
Refills: 0 | Status: DISCONTINUED | OUTPATIENT
Start: 2021-09-25 | End: 2021-10-04

## 2021-09-25 RX ORDER — PIPERACILLIN AND TAZOBACTAM 4; .5 G/20ML; G/20ML
3.38 INJECTION, POWDER, LYOPHILIZED, FOR SOLUTION INTRAVENOUS ONCE
Refills: 0 | Status: COMPLETED | OUTPATIENT
Start: 2021-09-25 | End: 2021-09-25

## 2021-09-25 RX ORDER — DEXTROSE 50 % IN WATER 50 %
12.5 SYRINGE (ML) INTRAVENOUS ONCE
Refills: 0 | Status: DISCONTINUED | OUTPATIENT
Start: 2021-09-25 | End: 2021-10-04

## 2021-09-25 RX ORDER — LACTOBACILLUS ACIDOPHILUS 100MM CELL
1 CAPSULE ORAL DAILY
Refills: 0 | Status: DISCONTINUED | OUTPATIENT
Start: 2021-09-25 | End: 2021-10-04

## 2021-09-25 RX ORDER — NOREPINEPHRINE BITARTRATE/D5W 8 MG/250ML
0.05 PLASTIC BAG, INJECTION (ML) INTRAVENOUS
Qty: 8 | Refills: 0 | Status: DISCONTINUED | OUTPATIENT
Start: 2021-09-25 | End: 2021-09-26

## 2021-09-25 RX ORDER — ACETAMINOPHEN 500 MG
650 TABLET ORAL ONCE
Refills: 0 | Status: COMPLETED | OUTPATIENT
Start: 2021-09-25 | End: 2021-09-25

## 2021-09-25 RX ORDER — SODIUM CHLORIDE 9 MG/ML
1750 INJECTION INTRAMUSCULAR; INTRAVENOUS; SUBCUTANEOUS ONCE
Refills: 0 | Status: COMPLETED | OUTPATIENT
Start: 2021-09-25 | End: 2021-09-25

## 2021-09-25 RX ORDER — CHLORHEXIDINE GLUCONATE 213 G/1000ML
15 SOLUTION TOPICAL EVERY 12 HOURS
Refills: 0 | Status: DISCONTINUED | OUTPATIENT
Start: 2021-09-25 | End: 2021-09-28

## 2021-09-25 RX ORDER — PIPERACILLIN AND TAZOBACTAM 4; .5 G/20ML; G/20ML
3.38 INJECTION, POWDER, LYOPHILIZED, FOR SOLUTION INTRAVENOUS EVERY 8 HOURS
Refills: 0 | Status: COMPLETED | OUTPATIENT
Start: 2021-09-25 | End: 2021-10-02

## 2021-09-25 RX ORDER — SODIUM CHLORIDE 9 MG/ML
1000 INJECTION, SOLUTION INTRAVENOUS
Refills: 0 | Status: DISCONTINUED | OUTPATIENT
Start: 2021-09-25 | End: 2021-10-04

## 2021-09-25 RX ORDER — DEXTROSE 50 % IN WATER 50 %
15 SYRINGE (ML) INTRAVENOUS ONCE
Refills: 0 | Status: DISCONTINUED | OUTPATIENT
Start: 2021-09-25 | End: 2021-10-04

## 2021-09-25 RX ORDER — INSULIN LISPRO 100/ML
VIAL (ML) SUBCUTANEOUS EVERY 6 HOURS
Refills: 0 | Status: DISCONTINUED | OUTPATIENT
Start: 2021-09-25 | End: 2021-10-04

## 2021-09-25 RX ORDER — HEPARIN SODIUM 5000 [USP'U]/ML
5000 INJECTION INTRAVENOUS; SUBCUTANEOUS EVERY 8 HOURS
Refills: 0 | Status: DISCONTINUED | OUTPATIENT
Start: 2021-09-25 | End: 2021-10-04

## 2021-09-25 RX ORDER — ALBUTEROL 90 UG/1
1 AEROSOL, METERED ORAL EVERY 6 HOURS
Refills: 0 | Status: DISCONTINUED | OUTPATIENT
Start: 2021-09-25 | End: 2021-10-04

## 2021-09-25 RX ORDER — PANTOPRAZOLE SODIUM 20 MG/1
40 TABLET, DELAYED RELEASE ORAL DAILY
Refills: 0 | Status: DISCONTINUED | OUTPATIENT
Start: 2021-09-25 | End: 2021-10-04

## 2021-09-25 RX ORDER — CHLORHEXIDINE GLUCONATE 213 G/1000ML
1 SOLUTION TOPICAL
Refills: 0 | Status: DISCONTINUED | OUTPATIENT
Start: 2021-09-25 | End: 2021-09-26

## 2021-09-25 RX ORDER — FENTANYL CITRATE 50 UG/ML
1 INJECTION INTRAVENOUS
Refills: 0 | Status: DISCONTINUED | OUTPATIENT
Start: 2021-09-25 | End: 2021-10-01

## 2021-09-25 RX ORDER — SENNA PLUS 8.6 MG/1
1 TABLET ORAL DAILY
Refills: 0 | Status: DISCONTINUED | OUTPATIENT
Start: 2021-09-25 | End: 2021-10-04

## 2021-09-25 RX ORDER — GLUCAGON INJECTION, SOLUTION 0.5 MG/.1ML
1 INJECTION, SOLUTION SUBCUTANEOUS ONCE
Refills: 0 | Status: DISCONTINUED | OUTPATIENT
Start: 2021-09-25 | End: 2021-10-04

## 2021-09-25 RX ORDER — IPRATROPIUM BROMIDE 0.2 MG/ML
1 SOLUTION, NON-ORAL INHALATION EVERY 6 HOURS
Refills: 0 | Status: DISCONTINUED | OUTPATIENT
Start: 2021-09-25 | End: 2021-10-04

## 2021-09-25 RX ORDER — SODIUM CHLORIDE 9 MG/ML
1000 INJECTION INTRAMUSCULAR; INTRAVENOUS; SUBCUTANEOUS ONCE
Refills: 0 | Status: COMPLETED | OUTPATIENT
Start: 2021-09-25 | End: 2021-09-25

## 2021-09-25 RX ORDER — INSULIN GLARGINE 100 [IU]/ML
36 INJECTION, SOLUTION SUBCUTANEOUS AT BEDTIME
Refills: 0 | Status: DISCONTINUED | OUTPATIENT
Start: 2021-09-25 | End: 2021-10-04

## 2021-09-25 RX ORDER — SUCRALFATE 1 G
1 TABLET ORAL
Refills: 0 | Status: DISCONTINUED | OUTPATIENT
Start: 2021-09-25 | End: 2021-10-04

## 2021-09-25 RX ORDER — SIMETHICONE 80 MG/1
80 TABLET, CHEWABLE ORAL
Refills: 0 | Status: DISCONTINUED | OUTPATIENT
Start: 2021-09-25 | End: 2021-10-04

## 2021-09-25 RX ORDER — VANCOMYCIN HCL 1 G
1000 VIAL (EA) INTRAVENOUS ONCE
Refills: 0 | Status: COMPLETED | OUTPATIENT
Start: 2021-09-25 | End: 2021-09-25

## 2021-09-25 RX ORDER — SODIUM CHLORIDE 9 MG/ML
1000 INJECTION, SOLUTION INTRAVENOUS
Refills: 0 | Status: DISCONTINUED | OUTPATIENT
Start: 2021-09-25 | End: 2021-09-26

## 2021-09-25 RX ORDER — ASPIRIN/CALCIUM CARB/MAGNESIUM 324 MG
81 TABLET ORAL DAILY
Refills: 0 | Status: DISCONTINUED | OUTPATIENT
Start: 2021-09-25 | End: 2021-10-04

## 2021-09-25 RX ADMIN — SODIUM CHLORIDE 80 MILLILITER(S): 9 INJECTION, SOLUTION INTRAVENOUS at 21:14

## 2021-09-25 RX ADMIN — SODIUM CHLORIDE 1000 MILLILITER(S): 9 INJECTION INTRAMUSCULAR; INTRAVENOUS; SUBCUTANEOUS at 20:50

## 2021-09-25 RX ADMIN — PIPERACILLIN AND TAZOBACTAM 200 GRAM(S): 4; .5 INJECTION, POWDER, LYOPHILIZED, FOR SOLUTION INTRAVENOUS at 15:38

## 2021-09-25 RX ADMIN — INSULIN GLARGINE 36 UNIT(S): 100 INJECTION, SOLUTION SUBCUTANEOUS at 23:34

## 2021-09-25 RX ADMIN — HEPARIN SODIUM 5000 UNIT(S): 5000 INJECTION INTRAVENOUS; SUBCUTANEOUS at 21:14

## 2021-09-25 RX ADMIN — SODIUM CHLORIDE 1750 MILLILITER(S): 9 INJECTION INTRAMUSCULAR; INTRAVENOUS; SUBCUTANEOUS at 15:02

## 2021-09-25 RX ADMIN — PIPERACILLIN AND TAZOBACTAM 3.38 GRAM(S): 4; .5 INJECTION, POWDER, LYOPHILIZED, FOR SOLUTION INTRAVENOUS at 15:53

## 2021-09-25 RX ADMIN — Medication 2: at 23:35

## 2021-09-25 RX ADMIN — Medication 650 MILLIGRAM(S): at 15:05

## 2021-09-25 RX ADMIN — Medication 1 MILLIGRAM(S): at 15:01

## 2021-09-25 RX ADMIN — ALBUTEROL 1 PUFF(S): 90 AEROSOL, METERED ORAL at 21:42

## 2021-09-25 RX ADMIN — PIPERACILLIN AND TAZOBACTAM 25 GRAM(S): 4; .5 INJECTION, POWDER, LYOPHILIZED, FOR SOLUTION INTRAVENOUS at 21:14

## 2021-09-25 RX ADMIN — Medication 650 MILLIGRAM(S): at 15:40

## 2021-09-25 RX ADMIN — Medication 1 PUFF(S): at 21:41

## 2021-09-25 RX ADMIN — Medication 1 GRAM(S): at 23:33

## 2021-09-25 RX ADMIN — Medication 250 MILLIGRAM(S): at 15:54

## 2021-09-25 RX ADMIN — Medication 1 MILLIGRAM(S): at 15:59

## 2021-09-25 NOTE — H&P ADULT - PROBLEM SELECTOR PLAN 1
recurrent, versus vent associated PNA, start on aspiration precautions, patient was started on Zosyn & Vancomycin, continue, trend TLC and monitor temp, f/u culture results, ID consult with Dr. Velez was called.

## 2021-09-25 NOTE — ED PROVIDER NOTE - CARE PLAN
Principal Discharge DX:	Other specified sepsis  Secondary Diagnosis:	Acute UTI  Secondary Diagnosis:	Pneumonia  Secondary Diagnosis:	Respiratory distress   1

## 2021-09-25 NOTE — CONSULT NOTE ADULT - SUBJECTIVE AND OBJECTIVE BOX
History of Present Illness: The patient is a 75 year old female with a history of HTN, DM, CVA, dementia, chronic respiratory failure s/p trach, PEG, recent cardiac arrest who presents with acute on chronic respiratory failure.    Past Medical/Surgical History:    Medications:    Family History: Non-contributory family history of premature cardiovascular atherosclerotic disease    Social History: No tobacco, alcohol or drug use    Review of Systems:  General: No fevers, chills, weight loss or gain  Skin: No rashes, color changes  Cardiovascular: No chest pain, orthopnea  Respiratory: No shortness of breath, cough  Gastrointestinal: No nausea, abdominal pain  Genitourinary: No incontinence, pain with urination  Musculoskeletal: No pain, swelling, decreased range of motion  Neurological: No headache, weakness  Psychiatric: No depression, anxiety  Endocrine: No weight loss or gain, increased thirst  All other systems are comprehensively negative.    Physical Exam:  Vitals:        Vital Signs Last 24 Hrs  T(C): 36.4 (25 Sep 2021 16:50), Max: 38.1 (25 Sep 2021 14:29)  T(F): 97.6 (25 Sep 2021 16:50), Max: 100.6 (25 Sep 2021 14:29)  HR: 104 (25 Sep 2021 17:46) (88 - 116)  BP: 131/74 (25 Sep 2021 16:50) (103/46 - 164/71)  BP(mean): --  RR: 19 (25 Sep 2021 16:50) (18 - 34)  SpO2: 100% (25 Sep 2021 17:46) (96% - 100%)  General: NAD  HEENT: MMM  Neck: No JVD, no carotid bruit  Lungs: CTAB  CV: RRR, nl S1/S2, no M/R/G  Abdomen: S/NT/ND, +BS  Extremities: No LE edema, no cyanosis  Neuro: AAOx3, non-focal  Skin: No rash    Labs:                        10.4   13.61 )-----------( 435      ( 25 Sep 2021 15:01 )             35.0     09-25    137  |  100  |  52<H>  ----------------------------<  297<H>  5.3   |  28  |  1.31<H>    Ca    9.7      25 Sep 2021 15:02  Phos  4.5     09-25  Mg     4.3     09-25    TPro  8.6<H>  /  Alb  2.5<L>  /  TBili  0.5  /  DBili  x   /  AST  41<H>  /  ALT  33  /  AlkPhos  170<H>  09-25        PT/INR - ( 25 Sep 2021 15:02 )   PT: 14.2 sec;   INR: 1.18 ratio         PTT - ( 25 Sep 2021 15:02 )  PTT:45.0 sec    ECG: Pending     History of Present Illness: The patient is a 75 year old female with a history of HTN, DM, CVA, dementia, chronic respiratory failure s/p trach, PEG, recent cardiac arrest who presents with acute on chronic respiratory failure. The patient is unable to provide any history to me due to advanced dementia and trach. She was found to have respiratory distress at NH so she was sent to ED.    Past Medical/Surgical History:  HTN, DM, CVA, dementia, chronic respiratory failure s/p trach, PEG, recent cardiac arrest    Medications:  Home Medications:  acetaminophen 160 mg/5 mL oral suspension: 20.31 milliliter(s) by gastrostomy tube every 6 hours, As Needed (23 Jun 2021 09:08)  albuterol 90 mcg/inh inhalation aerosol: 2 puff(s) inhaled every 6 hours (23 Jun 2021 09:08)  aspirin 81 mg oral tablet, chewable: 1 tab(s) by PEG tube once a day (15 Zay 2021 15:07)  Bacid (LAC) oral tablet: 2 tab(s) by gastrostomy tube once a day (23 Jun 2021 09:08)  bisacodyl 5 mg oral delayed release tablet: 1 tab(s) orally once a day (at bedtime) (23 Jun 2021 09:08)  Carafate 1 g/10 mL oral suspension: 10 milliliter(s) by gastrostomy tube 4 times a day (before meals and at bedtime) for 14 days (Started 6/4/21) (15 Zay 2021 15:07)  chlorhexidine 0.12% mucous membrane liquid: 15 milliliter(s) mucous membrane 2 times a day (15 Sep 2021 12:08)  insulin glargine: 36 unit(s) subcutaneous once a day (at bedtime) (15 Sep 2021 12:08)  ipratropium-albuterol 0.5 mg-2.5 mg/3 mL inhalation solution: 3 milliliter(s) inhaled 4 times a day (15 Zay 2021 15:07)  LORazepam 0.5 mg oral tablet: 1 tab(s) orally every 2 hours, As needed, Agitation (15 Sep 2021 12:08)  LORazepam 1 mg oral tablet: 1 tab(s) orally every 6 hours (15 Sep 2021 12:08)  methocarbamol: 250 milligram(s) by gastrostomy tube 2 times a day (15 Sep 2021 12:08)  pantoprazole 40 mg oral granule, delayed release: 40 milligram(s) by gastrostomy tube 2 times a day (15 Sep 2021 12:08)  polyethylene glycol 3350 oral powder for reconstitution: 17 gram(s) orally every 12 hours (23 Jun 2021 09:08)  senna 8.6 mg oral tablet: 1 tab(s) by gastrostomy tube once a day (at bedtime) (23 Jun 2021 09:08)  simethicone 80 mg oral tablet, chewable: 1 tab(s) orally every 6 hours (15 Sep 2021 12:08)      Family History: Non-contributory family history of premature cardiovascular atherosclerotic disease    Social History: No tobacco, alcohol or drug use    Review of Systems:  Unable to obtain    Physical Exam:  Vitals:        Vital Signs Last 24 Hrs  T(C): 36.4 (25 Sep 2021 16:50), Max: 38.1 (25 Sep 2021 14:29)  T(F): 97.6 (25 Sep 2021 16:50), Max: 100.6 (25 Sep 2021 14:29)  HR: 104 (25 Sep 2021 17:46) (88 - 116)  BP: 131/74 (25 Sep 2021 16:50) (103/46 - 164/71)  BP(mean): --  RR: 19 (25 Sep 2021 16:50) (18 - 34)  SpO2: 100% (25 Sep 2021 17:46) (96% - 100%)  General: Chronically ill appearing  HEENT: Trach  Neck: No JVD, no carotid bruit  Lungs: Coarse bilaterally  CV: RRR, nl S1/S2, no M/R/G  Abdomen: S/NT/ND, +BS  Extremities: No LE edema, no cyanosis  Neuro: AAOx0  Skin: No rash    Labs:                        10.4   13.61 )-----------( 435      ( 25 Sep 2021 15:01 )             35.0     09-25    137  |  100  |  52<H>  ----------------------------<  297<H>  5.3   |  28  |  1.31<H>    Ca    9.7      25 Sep 2021 15:02  Phos  4.5     09-25  Mg     4.3     09-25    TPro  8.6<H>  /  Alb  2.5<L>  /  TBili  0.5  /  DBili  x   /  AST  41<H>  /  ALT  33  /  AlkPhos  170<H>  09-25        PT/INR - ( 25 Sep 2021 15:02 )   PT: 14.2 sec;   INR: 1.18 ratio         PTT - ( 25 Sep 2021 15:02 )  PTT:45.0 sec    ECG: Pending

## 2021-09-25 NOTE — H&P ADULT - PROBLEM SELECTOR PLAN 5
ML related to the above, IVF management, avoid nephrotoxic meds as possible, monitor Vancomycin trough level closely if continued by ID, monitor I & o AND BUN/Cr.

## 2021-09-25 NOTE — ED ADULT NURSE NOTE - OBJECTIVE STATEMENT
brought in by EMS from [facility], c/o respiratory distress, vent dependent patient, Rectal temp 100.6rF brought in by EMS from [facility], c/o respiratory distress, vent dependent patient, Rectal temp 100.6rF, IV accessed and tolerating. Labs drawn & sent results pending. covid test swabbed, patient is constricted, nonverbal baseline, trach 7/ 400/18/100/5 , also has peg tube, noted gangrene toes, deep tissue injury at 5th and 4th digit of right foot, will continue to monitor.

## 2021-09-25 NOTE — H&P ADULT - HISTORY OF PRESENT ILLNESS
This is a 76 y/o F with PMH of HTN, DM type 2, CVA, recent Cardiac Arrest, Chronic Respiratory Failure, Vent Dependant s/p trach & PEG, Anemia with GI blood loss, and Dementia who was sent from [facility] facility for acute respiratory distress. Patient was admitted to [facility] about 3 weeks ago s/p cardiac arrest with B/L aspiration PNA, Sepsis, Proteus Mirabilis UTI, and infected foot ulcer, and discharged 10 days ago. At the ED she was found with thick ous at her airway that responded well to suction, was found with new CT findings suggestive of aspiration PNA, also with UTI, and lactic acid of 5.8 mmol/L. Patient is awake & alert but non verbal, no further history could be obtained at this time.

## 2021-09-25 NOTE — ED PROVIDER NOTE - OBJECTIVE STATEMENT
74 y/o female with a PMHx of DM2, pna, quadriplegia, advanced dementia, DM, CVA, GERD, HTN presents to the ED c/o recent admitted to [facility] after being found unresponsive at nursing home, concerned about post operative distress, hypotensive signs of aspirational pna, with volume dissipated, treated with levosa, zosyn. Admitted to ICU for aspirational pna. Urinary culture proteus, which was also sensitive to Zosyn. Also appears to have paronychia of toe which she had Zyvox during hospital stay. She was found to be anemic so she was transfused of 1 L RBC, no signs of active bleeding and pt stabilized. Pt was stabilized and d/c back to rehab. Patient reported for reported respiratory distress. Pt found to be on normal vent settings, does have increased  respiratory rate. No further hx can be obtained at this time as pt is nonverbal.

## 2021-09-25 NOTE — H&P ADULT - PROBLEM SELECTOR PLAN 6
disproportionate to RYAN severity, check for EKG findings, pending, possible lab error, on tele monitor, repeat magnesium level in am.

## 2021-09-25 NOTE — H&P ADULT - PROBLEM SELECTOR PLAN 9
was started on UFH 5000 units sub Q every 8h for DVT prophylaxis given her high risk, patient with recent GI blood loss with anemia s/p 1 unit PRBCs transfusion, check stool for FOB, monitor H & H closely, continue on Carafate Q 6h via PEG, in addition to Protonix 40 mg IVP daily for GI prophylaxis.

## 2021-09-25 NOTE — CONSULT NOTE ADULT - SUBJECTIVE AND OBJECTIVE BOX
10.4   13.61 )-----------( 435      ( 25 Sep 2021 15:01 )             35.0       CBC Full  -  ( 25 Sep 2021 15:01 )  WBC Count : 13.61 K/uL  RBC Count : 3.80 M/uL  Hemoglobin : 10.4 g/dL  Hematocrit : 35.0 %  Platelet Count - Automated : 435 K/uL  Mean Cell Volume : 92.1 fl  Mean Cell Hemoglobin : 27.4 pg  Mean Cell Hemoglobin Concentration : 29.7 gm/dL  Auto Neutrophil # : 10.22 K/uL  Auto Lymphocyte # : 1.99 K/uL  Auto Monocyte # : 0.93 K/uL  Auto Eosinophil # : 0.08 K/uL  Auto Basophil # : 0.06 K/uL  Auto Neutrophil % : 75.2 %  Auto Lymphocyte % : 14.6 %  Auto Monocyte % : 6.8 %  Auto Eosinophil % : 0.6 %  Auto Basophil % : 0.4 %          137  |  100  |  52<H>  ----------------------------<  297<H>  5.3   |  28  |  1.31<H>    Ca    9.7      25 Sep 2021 15:02  Phos  4.5       Mg     4.3         TPro  8.6<H>  /  Alb  2.5<L>  /  TBili  0.5  /  DBili  x   /  AST  41<H>  /  ALT  33  /  AlkPhos  170<H>        CAPILLARY BLOOD GLUCOSE          Vital Signs Last 24 Hrs  T(C): 36.4 (25 Sep 2021 16:50), Max: 38.1 (25 Sep 2021 14:29)  T(F): 97.6 (25 Sep 2021 16:50), Max: 100.6 (25 Sep 2021 14:29)  HR: 104 (25 Sep 2021 17:46) (88 - 116)  BP: 131/74 (25 Sep 2021 16:50) (103/46 - 164/71)  BP(mean): --  RR: 19 (25 Sep 2021 16:50) (18 - 34)  SpO2: 100% (25 Sep 2021 17:46) (96% - 100%)    Urinalysis Basic - ( 25 Sep 2021 15:02 )    Color: Yellow / Appearance: Turbid / S.010 / pH: x  Gluc: x / Ketone: Trace  / Bili: Negative / Urobili: Negative mg/dL   Blood: x / Protein: 100 mg/dL / Nitrite: Negative   Leuk Esterase: Moderate / RBC: 6-10 /HPF / WBC 26-50   Sq Epi: x / Non Sq Epi: Few / Bacteria: Many        PT/INR - ( 25 Sep 2021 15:02 )   PT: 14.2 sec;   INR: 1.18 ratio         PTT - ( 25 Sep 2021 15:02 )  PTT:45.0 sec   Patient is a 75y Female whom presented to the hospital with ckd and manasa     PAST MEDICAL & SURGICAL HISTORY:  Dementia of frontal lobe type    Aphasic stroke    Diabetes mellitus    Respiratory failure    Hypertension    GERD (gastroesophageal reflux disease)    Constipation    Respiratory failure    CVA (cerebral vascular accident)    HTN (hypertension)    DM (diabetes mellitus)    Advanced dementia    COVID-19 virus detected    Quadriplegia    Pneumonia    Type II diabetes mellitus    Hx of appendectomy    Gastrostomy in place    Tracheostomy in place    Tracheostomy tube present    Feeding by G-tube        MEDICATIONS  (STANDING):  aspirin  chewable 81 milliGRAM(s) Oral daily  chlorhexidine 0.12% Liquid 15 milliLiter(s) Oral Mucosa every 12 hours  chlorhexidine 4% Liquid 1 Application(s) Topical <User Schedule>  dextrose 40% Gel 15 Gram(s) Oral once  dextrose 5%. 1000 milliLiter(s) (50 mL/Hr) IV Continuous <Continuous>  dextrose 5%. 1000 milliLiter(s) (100 mL/Hr) IV Continuous <Continuous>  dextrose 50% Injectable 25 Gram(s) IV Push once  dextrose 50% Injectable 12.5 Gram(s) IV Push once  dextrose 50% Injectable 25 Gram(s) IV Push once  glucagon  Injectable 1 milliGRAM(s) IntraMuscular once  heparin   Injectable 5000 Unit(s) SubCutaneous every 8 hours  insulin lispro (ADMELOG) corrective regimen sliding scale   SubCutaneous every 6 hours  ipratropium 17 MICROgram(s) HFA Inhaler 1 Puff(s) Inhalation every 6 hours  lactated ringers. 1000 milliLiter(s) (80 mL/Hr) IV Continuous <Continuous>  lactobacillus acidophilus 1 Tablet(s) Oral daily  norepinephrine Infusion 0.05 MICROgram(s)/kG/Min (5.74 mL/Hr) IV Continuous <Continuous>  pantoprazole  Injectable 40 milliGRAM(s) IV Push daily  piperacillin/tazobactam IVPB.. 3.375 Gram(s) IV Intermittent every 8 hours  senna 1 Tablet(s) Oral daily      Allergies    codeine (Hives)    Intolerances        SOCIAL HISTORY:  Denies ETOh,Smoking,     FAMILY HISTORY:  No pertinent family history in first degree relatives        REVIEW OF SYSTEMS:  unable to obtained a good review system      VITAL:  T(C): , Max: 38.1 (21 @ 14:29)  T(F): , Max: 100.6 (21 @ 14:29)  HR: 86 (21 @ 21:05)  BP: 110/38 (21 @ 20:57)  BP(mean): 60 (21 @ 20:57)  RR: 24 (21 @ 20:57)  SpO2: 95% (21 @ 21:05)  Wt(kg): --    I and O's:    Height (cm): 165.1 ( @ 14:29)  Weight (kg): 61.2 ( @ 14:29)  BMI (kg/m2): 22.5 ( @ 14:29)  BSA (m2): 1.67 ( @ 14:29)    PHYSICAL EXAM:    Constitutional: NAD  HEENT: conjunctive   clear   Neck:  No JVD  Respiratory: decrease bs b/l   Cardiovascular: S1 and S2  Gastrointestinal: BS+, soft, pos peg   Extremities: No peripheral edema    MEDICATIONS  (STANDING):  aspirin  chewable 81 milliGRAM(s) Oral daily  chlorhexidine 0.12% Liquid 15 milliLiter(s) Oral Mucosa every 12 hours  chlorhexidine 4% Liquid 1 Application(s) Topical <User Schedule>  dextrose 40% Gel 15 Gram(s) Oral once  dextrose 5%. 1000 milliLiter(s) (50 mL/Hr) IV Continuous <Continuous>  dextrose 5%. 1000 milliLiter(s) (100 mL/Hr) IV Continuous <Continuous>  dextrose 50% Injectable 25 Gram(s) IV Push once  dextrose 50% Injectable 12.5 Gram(s) IV Push once  dextrose 50% Injectable 25 Gram(s) IV Push once  glucagon  Injectable 1 milliGRAM(s) IntraMuscular once  heparin   Injectable 5000 Unit(s) SubCutaneous every 8 hours  insulin lispro (ADMELOG) corrective regimen sliding scale   SubCutaneous every 6 hours  ipratropium 17 MICROgram(s) HFA Inhaler 1 Puff(s) Inhalation every 6 hours  lactated ringers. 1000 milliLiter(s) (80 mL/Hr) IV Continuous <Continuous>  lactobacillus acidophilus 1 Tablet(s) Oral daily  norepinephrine Infusion 0.05 MICROgram(s)/kG/Min (5.74 mL/Hr) IV Continuous <Continuous>  pantoprazole  Injectable 40 milliGRAM(s) IV Push daily  piperacillin/tazobactam IVPB.. 3.375 Gram(s) IV Intermittent every 8 hours  senna 1 Tablet(s) Oral daily    LABS:                        10.4   13.61 )-----------( 435      ( 25 Sep 2021 15:01 )             35.0         137  |  100  |  52<H>  ----------------------------<  297<H>  5.3   |  28  |  1.31<H>    Ca    9.7      25 Sep 2021 15:02  Phos  4.5       Mg     4.3         TPro  8.6<H>  /  Alb  2.5<L>  /  TBili  0.5  /  DBili  x   /  AST  41<H>  /  ALT  33  /  AlkPhos  170<H>        Urine Studies:  Urinalysis Basic - ( 25 Sep 2021 15:02 )    Color: Yellow / Appearance: Turbid / S.010 / pH: x  Gluc: x / Ketone: Trace  / Bili: Negative / Urobili: Negative mg/dL   Blood: x / Protein: 100 mg/dL / Nitrite: Negative   Leuk Esterase: Moderate / RBC: 6-10 /HPF / WBC 26-50   Sq Epi: x / Non Sq Epi: Few / Bacteria: Many            RADIOLOGY & ADDITIONAL STUDIES:                                            10.4   1361 )-----------( 435      ( 25 Sep 2021 15:01 )             35.0       CBC Full  -  ( 25 Sep 2021 15:01 )  WBC Count : 13.61 K/uL  RBC Count : 3.80 M/uL  Hemoglobin : 10.4 g/dL  Hematocrit : 35.0 %  Platelet Count - Automated : 435 K/uL  Mean Cell Volume : 92.1 fl  Mean Cell Hemoglobin : 27.4 pg  Mean Cell Hemoglobin Concentration : 29.7 gm/dL  Auto Neutrophil # : 10.22 K/uL  Auto Lymphocyte # : 1.99 K/uL  Auto Monocyte # : 0.93 K/uL  Auto Eosinophil # : 0.08 K/uL  Auto Basophil # : 0.06 K/uL  Auto Neutrophil % : 75.2 %  Auto Lymphocyte % : 14.6 %  Auto Monocyte % : 6.8 %  Auto Eosinophil % : 0.6 %  Auto Basophil % : 0.4 %          137  |  100  |  52<H>  ----------------------------<  297<H>  5.3   |  28  |  1.31<H>    Ca    9.7      25 Sep 2021 15:02  Phos  4.5       Mg     4.3         TPro  8.6<H>  /  Alb  2.5<L>  /  TBili  0.5  /  DBili  x   /  AST  41<H>  /  ALT  33  /  AlkPhos  170<H>        CAPILLARY BLOOD GLUCOSE          Vital Signs Last 24 Hrs  T(C): 36.4 (25 Sep 2021 16:50), Max: 38.1 (25 Sep 2021 14:29)  T(F): 97.6 (25 Sep 2021 16:50), Max: 100.6 (25 Sep 2021 14:29)  HR: 104 (25 Sep 2021 17:46) (88 - 116)  BP: 131/74 (25 Sep 2021 16:50) (103/46 - 164/71)  BP(mean): --  RR: 19 (25 Sep 2021 16:50) (18 - 34)  SpO2: 100% (25 Sep 2021 17:46) (96% - 100%)    Urinalysis Basic - ( 25 Sep 2021 15:02 )    Color: Yellow / Appearance: Turbid / S.010 / pH: x  Gluc: x / Ketone: Trace  / Bili: Negative / Urobili: Negative mg/dL   Blood: x / Protein: 100 mg/dL / Nitrite: Negative   Leuk Esterase: Moderate / RBC: 6-10 /HPF / WBC 26-50   Sq Epi: x / Non Sq Epi: Few / Bacteria: Many        PT/INR - ( 25 Sep 2021 15:02 )   PT: 14.2 sec;   INR: 1.18 ratio         PTT - ( 25 Sep 2021 15:02 )  PTT:45.0 sec

## 2021-09-25 NOTE — H&P ADULT - PROBLEM SELECTOR PLAN 4
possibly related to UTI and/or PNA, lactic acid on presentation was 5.8 mmol/L, responded well to IVF resuscitation, down to normal, maintain IVF, monitor I & O, in addition to the above management.

## 2021-09-25 NOTE — CONSULT NOTE ADULT - SUBJECTIVE AND OBJECTIVE BOX
BREA BECKHAM    SY EDIP 04    Allergies    codeine (Hives)    Intolerances        PAST MEDICAL & SURGICAL HISTORY:  Dementia of frontal lobe type    Aphasic stroke    Diabetes mellitus    Respiratory failure    Hypertension    GERD (gastroesophageal reflux disease)    Constipation    Respiratory failure    CVA (cerebral vascular accident)    HTN (hypertension)    DM (diabetes mellitus)    Advanced dementia    COVID-19 virus detected    Quadriplegia    Pneumonia    Type II diabetes mellitus    Hx of appendectomy    Gastrostomy in place    Tracheostomy in place    Tracheostomy tube present    Feeding by G-tube        FAMILY HISTORY:  No pertinent family history in first degree relatives        Home Medications:  acetaminophen 160 mg/5 mL oral suspension: 20.31 milliliter(s) by gastrostomy tube every 6 hours, As Needed (2021 09:08)  albuterol 90 mcg/inh inhalation aerosol: 2 puff(s) inhaled every 6 hours (2021 09:08)  aspirin 81 mg oral tablet, chewable: 1 tab(s) by PEG tube once a day (15 Zay 2021 15:07)  Bacid (LAC) oral tablet: 2 tab(s) by gastrostomy tube once a day (2021 09:08)  bisacodyl 5 mg oral delayed release tablet: 1 tab(s) orally once a day (at bedtime) (2021 09:08)  Carafate 1 g/10 mL oral suspension: 10 milliliter(s) by gastrostomy tube 4 times a day (before meals and at bedtime) for 14 days (Started 21) (15 Zay 2021 15:07)  chlorhexidine 0.12% mucous membrane liquid: 15 milliliter(s) mucous membrane 2 times a day (15 Sep 2021 12:08)  insulin glargine: 36 unit(s) subcutaneous once a day (at bedtime) (15 Sep 2021 12:08)  ipratropium-albuterol 0.5 mg-2.5 mg/3 mL inhalation solution: 3 milliliter(s) inhaled 4 times a day (15 Zay 2021 15:07)  LORazepam 0.5 mg oral tablet: 1 tab(s) orally every 2 hours, As needed, Agitation (15 Sep 2021 12:08)  LORazepam 1 mg oral tablet: 1 tab(s) orally every 6 hours (15 Sep 2021 12:08)  methocarbamol: 250 milligram(s) by gastrostomy tube 2 times a day (15 Sep 2021 12:08)  pantoprazole 40 mg oral granule, delayed release: 40 milligram(s) by gastrostomy tube 2 times a day (15 Sep 2021 12:08)  polyethylene glycol 3350 oral powder for reconstitution: 17 gram(s) orally every 12 hours (2021 09:08)  senna 8.6 mg oral tablet: 1 tab(s) by gastrostomy tube once a day (at bedtime) (2021 09:08)  simethicone 80 mg oral tablet, chewable: 1 tab(s) orally every 6 hours (15 Sep 2021 12:08)      MEDICATIONS  (STANDING):  chlorhexidine 0.12% Liquid 15 milliLiter(s) Oral Mucosa every 12 hours  sodium chloride 0.9% Bolus 1000 milliLiter(s) IV Bolus once    MEDICATIONS  (PRN):              Vital Signs Last 24 Hrs  T(C): 36.4 (25 Sep 2021 16:50), Max: 38.1 (25 Sep 2021 14:29)  T(F): 97.6 (25 Sep 2021 16:50), Max: 100.6 (25 Sep 2021 14:29)  HR: 78 (25 Sep 2021 20:30) (78 - 116)  BP: 87/39 (25 Sep 2021 20:30) (87/39 - 164/71)  BP(mean): --  RR: 20 (25 Sep 2021 20:30) (18 - 34)  SpO2: 100% (25 Sep 2021 20:30) (96% - 100%)        Mode: AC/ CMV (Assist Control/ Continuous Mandatory Ventilation), RR (machine): 18, TV (machine): 400, FiO2: 80, PEEP: 5, PS: 5, ITime: 1, MAP: 12, P-High: 0.4, P-Low: 10, PIP: 28      LABS:                        10.4   13.61 )-----------( 435      ( 25 Sep 2021 15:01 )             35.0     09-    137  |  100  |  52<H>  ----------------------------<  297<H>  5.3   |  28  |  1.31<H>    Ca    9.7      25 Sep 2021 15:02  Phos  4.5       Mg     4.3         TPro  8.6<H>  /  Alb  2.5<L>  /  TBili  0.5  /  DBili  x   /  AST  41<H>  /  ALT  33  /  AlkPhos  170<H>      PT/INR - ( 25 Sep 2021 15:02 )   PT: 14.2 sec;   INR: 1.18 ratio         PTT - ( 25 Sep 2021 15:02 )  PTT:45.0 sec  Urinalysis Basic - ( 25 Sep 2021 15:02 )    Color: Yellow / Appearance: Turbid / S.010 / pH: x  Gluc: x / Ketone: Trace  / Bili: Negative / Urobili: Negative mg/dL   Blood: x / Protein: 100 mg/dL / Nitrite: Negative   Leuk Esterase: Moderate / RBC: 6-10 /HPF / WBC 26-50   Sq Epi: x / Non Sq Epi: Few / Bacteria: Many            WBC:  WBC Count: 13.61 K/uL ( @ 15:01)      MICROBIOLOGY:  RECENT CULTURES:              PT/INR - ( 25 Sep 2021 15:02 )   PT: 14.2 sec;   INR: 1.18 ratio         PTT - ( 25 Sep 2021 15:02 )  PTT:45.0 sec    Sodium:  Sodium, Serum: 137 mmol/L ( @ 15:02)      1.31 mg/dL  @ 15:02      Hemoglobin:  Hemoglobin: 10.4 g/dL ( @ 15:01)      Platelets: Platelet Count - Automated: 435 K/uL ( @ 15:01)      LIVER FUNCTIONS - ( 25 Sep 2021 15:02 )  Alb: 2.5 g/dL / Pro: 8.6 g/dL / ALK PHOS: 170 U/L / ALT: 33 U/L DA / AST: 41 U/L / GGT: x             Urinalysis Basic - ( 25 Sep 2021 15:02 )    Color: Yellow / Appearance: Turbid / S.010 / pH: x  Gluc: x / Ketone: Trace  / Bili: Negative / Urobili: Negative mg/dL   Blood: x / Protein: 100 mg/dL / Nitrite: Negative   Leuk Esterase: Moderate / RBC: 6-10 /HPF / WBC 26-50   Sq Epi: x / Non Sq Epi: Few / Bacteria: Many        RADIOLOGY & ADDITIONAL STUDIES:      MICROBIOLOGY:  RECENT CULTURES:

## 2021-09-25 NOTE — H&P ADULT - ASSESSMENT
76 y/o F with PMH of HTN, DM type 2, CVA, recent Cardiac Arrest, Chronic Respiratory Failure, Vent Dependant s/p trach & PEG, Anemia with GI blood loss, and Dementia presented with acute respiratory distress.

## 2021-09-25 NOTE — CONSULT NOTE ADULT - SUBJECTIVE AND OBJECTIVE BOX
Date/Time Patient Seen:  		  Referring MD:   Data Reviewed	       Patient is a 75y old  Female who presents with a chief complaint of     Subjective/HPI    Hospital Course:  Discharge Date	15-Sep-2021  Admission Date	07-Sep-2021 11:46  Reason for Admission	Unresponsive, post-cardiac arrest  Hospital Course	  75F chronic respiratory failure trach/vent dependent, total care/functional quadriplegia with PEG tube, DM2 on insulin, HTN, GERD, CVA, dementia, chronic constipation/ileus p/w being found unresponsive at [facility], brought in by EMS for post cardiac arrest care.       In ED, pt was given IVF NS bolus, was found to be hypotensive, and started on levophed for pressor support, and CT showed signs of aspiration PNA, an dpt was started on IV zosyn.    Patient admitted to [facility].  initially on levophed but then weaned off.     Completed IV Zosyn for aspiration pneumonia seen on CT and Proteus mirabilus UTI  Perionychia of left first toe also completed course of zyvox     Anemia - Chronic disease + Iron deficiency possibly related to GI blood loss.  Received IV iron plus 1 unit prbc.  as per GI PPI BId and conservative management.  No signs of overt bleeding.  CT negative for RP bleed.     Diabetes type 2 on insulin - continued on lantus with moderate coverage scale.     Involuntary jerky movements - not seizure activity.  AS per  related to chronic Ileus issues.  treated with ativan prn.     Cleared to return to chronic vent unit at SNF.     PAST MEDICAL & SURGICAL HISTORY:  Dementia of frontal lobe type    Aphasic stroke    Diabetes mellitus    Respiratory failure    Hypertension    GERD (gastroesophageal reflux disease)    Constipation    Respiratory failure    CVA (cerebral vascular accident)    HTN (hypertension)    DM (diabetes mellitus)    Advanced dementia    COVID-19 virus detected    Quadriplegia    Pneumonia    Type II diabetes mellitus    Hx of appendectomy    Gastrostomy in place    Tracheostomy in place    Tracheostomy tube present    Feeding by G-tube          Medication list         MEDICATIONS  (STANDING):    MEDICATIONS  (PRN):         Vitals log        ICU Vital Signs Last 24 Hrs  T(C): 36.4 (25 Sep 2021 16:50), Max: 38.1 (25 Sep 2021 14:29)  T(F): 97.6 (25 Sep 2021 16:50), Max: 100.6 (25 Sep 2021 14:29)  HR: 88 (25 Sep 2021 16:50) (88 - 116)  BP: 131/74 (25 Sep 2021 16:50) (103/46 - 164/71)  BP(mean): --  ABP: --  ABP(mean): --  RR: 19 (25 Sep 2021 16:50) (18 - 34)  SpO2: 100% (25 Sep 2021 16:50) (96% - 100%)       Mode: AC/ CMV (Assist Control/ Continuous Mandatory Ventilation)  RR (machine): 18  TV (machine): 400  FiO2: 100  PEEP: 5  ITime: 1  MAP: 11  P-High: 0.4  P-Low: 10  PIP: 33      Input and Output:  I&O's Detail      Lab Data                        10.4   13.61 )-----------( 435      ( 25 Sep 2021 15:01 )             35.0     09-25    137  |  100  |  52<H>  ----------------------------<  297<H>  5.3   |  28  |  1.31<H>    Ca    9.7      25 Sep 2021 15:02  Phos  4.5     09-25  Mg     4.3     09-25    TPro  8.6<H>  /  Alb  2.5<L>  /  TBili  0.5  /  DBili  x   /  AST  41<H>  /  ALT  33  /  AlkPhos  170<H>  09-25            Review of Systems	      Objective     Physical Examination        Pertinent Lab findings & Imaging      Woody:  NO   Adequate UO     I&O's Detail           Discussed with:     Cultures:	        Radiology      EXAM:  CT ABDOMEN AND PELVIS                                  PROCEDURE DATE:  09/14/2021          INTERPRETATION:  CLINICAL INFORMATION: Anemia, rule out retroperitoneal bleed    COMPARISON: 6/15/2021.    CONTRAST/COMPLICATIONS:  IV Contrast: NONE  Oral Contrast: NONE  Complications: None reported at time of study completion    PROCEDURE:  CT of the Abdomen and Pelvis was performed.  Sagittal and coronal reformats were performed.    FINDINGS:  LOWER CHEST: Bibasilar atelectasis/infiltrate. Correlate clinically for infection. Trace basilar effusions. Cardiomegaly with cardiac vascular calcination. Decrease cardiac chamber blood pool attenuation suggests an anemic state..    LIVER: Within normal limits.  BILE DUCTS: Normal caliber.  GALLBLADDER: Within normal limits.  SPLEEN: Within normal limits.  PANCREAS: Within normal limits.  ADRENALS: Within normal limits.  KIDNEYS/URETERS: Within normal limits.    BLADDER: Layering calculi versus  posterior wall calcifications similar to prior  REPRODUCTIVE ORGANS: Unremarkable    BOWEL: No bowel obstruction .mild generalized ileus. Large colorectal stool burden. Gastrostomy tube in situ. Appendix no appendicitis  PERITONEUM: No ascites.  VESSELS: Nonaneurysmal.  RETROPERITONEUM/LYMPH NODES: No lymphadenopathy. No retroperitoneal hematoma..  ABDOMINAL WALL: Dependent anasarca.  No evidence of rectus sheath hematoma..  BONES: Stable.    IMPRESSION:  No evidence of a retroperitoneal hematoma or other acute finding abdomen and pelvis.  Additional findings as discussed        --- End of Report ---              NA LIMA MD; Attending Radiologist  This document has been electronically signed. Sep 14 2021  1:12PM

## 2021-09-25 NOTE — H&P ADULT - NSHPLABSRESULTS_GEN_ALL_CORE
-        137  |  100  |  52<H>  ----------------------------<  297<H>  5.3   |  28  |  1.31<H>    Ca    9.7      25 Sep 2021 15:02  Phos  4.5       Mg     4.3         TPro  8.6<H>  /  Alb  2.5<L>  /  TBili  0.5  /  DBili  x   /  AST  41<H>  /  ALT  33  /  AlkPhos  170<H>                            10.4   13.61 )-----------( 435      ( 25 Sep 2021 15:01 )             35.0         LIVER FUNCTIONS - ( 25 Sep 2021 15:02 )  Alb: 2.5 g/dL / Pro: 8.6 g/dL / ALK PHOS: 170 U/L / ALT: 33 U/L DA / AST: 41 U/L / GGT: x           PT/INR - ( 25 Sep 2021 15:02 )   PT: 14.2 sec;   INR: 1.18 ratio    PTT - ( 25 Sep 2021 15:02 )  PTT:45.0 sec    Urinalysis Basic - ( 25 Sep 2021 15:02 )  Color: Yellow / Appearance: Turbid / S.010 / pH: x  Gluc: x / Ketone: Trace  / Bili: Negative / Urobili: Negative mg/dL   Blood: x / Protein: 100 mg/dL / Nitrite: Negative   Leuk Esterase: Moderate / RBC: 6-10 /HPF / WBC 26-50   Sq Epi: x / Non Sq Epi: Few / Bacteria: Many      Lactate, Blood (21 @ 17:03)   Lactate, Blood: 1.4 mmol/L   Lactate, Blood (21 @ 15:01)   Lactate, Blood: 5.8: TYPE:(C=Critical, N=Notification, A=Abnormal) C   TESTS: Lactate   DATE/TIME CALLED: _2021 15:30:27 EDT         SARS-CoV-2: NotDetec (25 Sep 2021 15:06)  COVID-19 PCR: NotDetec (15 Sep 2021 07:29)  COVID-19 PCR: NotDetec (12 Sep 2021 10:24)  SARS-CoV-2: NotDetec (07 Sep 2021 12:40)  COVID-19 PCR: NotDetec (2021 14:59)  SARS-CoV-2: NotDetec (2021 23:02)    T ABDOMEN AND PELVIS                        EXAM:  CT CHEST                        PROCEDURE DATE:  2021    INTERPRETATION:  CLINICAL INFORMATION: Fever and abdominal pain  COMPARISON: CT abdomen pelvis 2021. CT chest 2021.  IMPRESSION: Motion limited examination. Examination also limited without intravenous contrast.  Dense bilateral lung consolidations. New since the previous exam. Mediastinal lymphadenopathy. Small bilateral effusions and bibasilar atelectasis. Findings may represent infection, correlate clinically and follow to resolution.  Dilated fluid-filled rectum without rectal wall thickening unchanged from previous exam. Remainder of the colon is mildly dilated with some fluid. Unchanged.      CXR:    As per my review shows normal cardiac shadow size, B/L patchy pulmonary infiltrates, no pleural effusion, or pneumothorax. Pending official report.       EKG:    pending.    -

## 2021-09-25 NOTE — ED PROVIDER NOTE - CLINICAL SUMMARY MEDICAL DECISION MAKING FREE TEXT BOX
pt presenting in respiratory distress with s/s concerning for infectious process. Will obtain screening labs, EKG, CT chest and abd as abd is distended as well and treat for anxiety. If source of infection noted will begin abx

## 2021-09-25 NOTE — CONSULT NOTE ADULT - SUBJECTIVE AND OBJECTIVE BOX
BREA BECKHAM  [id number]  Patient is a 75y old  Female who presents with a chief complaint of   HPI:  75 year old female with a history of HTN, DM, CVA, dementia, chronic respiratory failure s/p trach, PEG, recent cardiac arrest who presents with acute on chronic respiratory failure. The patient is unable to provide any history to me due to advanced dementia and trach. She was found to have respiratory distress at NH so she was sent to ED. Hypoxic in NH was titrated up to 100%. Pt had been agitated in ER was given 0.5 ativan in ER. Pt agitation resolved   Pt trach to vent at this time with new seemingly aspiration/multifacial PNA. Pt in Er was normotensive then recently began to drop BP, additional fluid bolus was given and will start on levophed to maintain MAP>65     Will admit to [facility] for acute on chronic hypoxic respiratory failure, septic shock, aspiration PNA and metabolic encephalopathy           REVIEW OF SYSTEMS:    ROS unable to be obtained pt trach to vent and encephalopathic     Physical Exam:  Vital Signs Last 24 Hrs  T(C): 36.4 (25 Sep 2021 16:50), Max: 38.1 (25 Sep 2021 14:29)  T(F): 97.6 (25 Sep 2021 16:50), Max: 100.6 (25 Sep 2021 14:29)  HR: 78 (25 Sep 2021 20:30) (78 - 116)  BP: 87/39 (25 Sep 2021 20:30) (87/39 - 164/71)  BP(mean): --  RR: 20 (25 Sep 2021 20:30) (18 - 34)  SpO2: 100% (25 Sep 2021 20:30) (96% - 100%)    Gen:  Awake, arousable to deep stimulus   CNS: non focal 	  Neck: no JVD  RES : coarse BS BL and distant at the bases, no wheezing, trach to vent                       CVS: Regular  rhythm. Normal S1/S2  Abd: Soft,  Bowel sounds present. + PEG Distended but soft   Skin: No rash.  Ext:  no edema    ============================I/O===========================   I&O's Detail    ============================ LABS =========================                        10.4   13.61 )-----------( 435      ( 25 Sep 2021 15:01 )             35.0         137  |  100  |  52<H>  ----------------------------<  297<H>  5.3   |  28  |  1.31<H>    Ca    9.7      25 Sep 2021 15:02  Phos  4.5       Mg     4.3         TPro  8.6<H>  /  Alb  2.5<L>  /  TBili  0.5  /  DBili  x   /  AST  41<H>  /  ALT  33  /  AlkPhos  170<H>      LIVER FUNCTIONS - ( 25 Sep 2021 15:02 )  Alb: 2.5 g/dL / Pro: 8.6 g/dL / ALK PHOS: 170 U/L / ALT: 33 U/L DA / AST: 41 U/L / GGT: x           PT/INR - ( 25 Sep 2021 15:02 )   PT: 14.2 sec;   INR: 1.18 ratio         PTT - ( 25 Sep 2021 15:02 )  PTT:45.0 sec    Urinalysis Basic - ( 25 Sep 2021 15:02 )    Color: Yellow / Appearance: Turbid / S.010 / pH: x  Gluc: x / Ketone: Trace  / Bili: Negative / Urobili: Negative mg/dL   Blood: x / Protein: 100 mg/dL / Nitrite: Negative   Leuk Esterase: Moderate / RBC: 6-10 /HPF / WBC 26-50   Sq Epi: x / Non Sq Epi: Few / Bacteria: Many      ======================Micro/Rad/Cardio=================  Culture: pending   CXR: < from: CT Abdomen and Pelvis No Cont (21 @ 19:36) >  IMPRESSION: Motion limited examination. Examination also limited without intravenous contrast.    Dense bilateral lung consolidations. New since the previous exam. Mediastinal lymphadenopathy. Small bilateral effusions and bibasilar atelectasis. Findings may represent infection, correlate clinically and follow to resolution.    Dilated fluid-filled rectum without rectal wall thickening unchanged from previous exam. Remainder of the colon is mildly dilated with some fluid. Unchanged.    --- End of Report ---      < end of copied text >      ======================================================  PAST MEDICAL & SURGICAL HISTORY:  Dementia of frontal lobe type    Aphasic stroke    Diabetes mellitus    Respiratory failure    Hypertension    GERD (gastroesophageal reflux disease)    Constipation    Respiratory failure    CVA (cerebral vascular accident)    HTN (hypertension)    DM (diabetes mellitus)    Advanced dementia    COVID-19 virus detected    Quadriplegia    Pneumonia    Type II diabetes mellitus    Hx of appendectomy    Gastrostomy in place    Tracheostomy in place    Tracheostomy tube present    Feeding by G-tube          ====================ASSESSMENT ==============  1. Acute on chronic hypoxic RF   2. multifocal aspiration PNA  3. septic shock   4. metabolic encephalopathy     Plan:  ====================== NEUROLOGY=====================  LORazepam   Injectable 0.5 milliGRAM(s) IV Push every 2 hours PRN dyssynchrony and agitation  LORazepam   Injectable 1 milliGRAM(s) IV Push every 6 hours PRN agtation and vent dyssynchrony  -tylenol for fever     ==================== RESPIRATORY======================  Mechanical Ventilation:  Mode: AC/ CMV (Assist Control/ Continuous Mandatory Ventilation)  RR (machine): 18  TV (machine): 400  FiO2: 80  PEEP: 5  PS: 5  ITime: 1  MAP: 12  P-High: 0.4  P-Low: 10  PIP: 28  -trach to vent   -pending ABG   ALBUTerol    90 MICROgram(s) HFA Inhaler 1 Puff(s) Inhalation every 6 hours PRN Shortness of Breath and/or Wheezing  albuterol/ipratropium for Nebulization 3 milliLiter(s) Nebulizer every 6 hours    ====================CARDIOVASCULAR==================  -Pt s/p 2L IVF bolus   -LA trending down, but in ER became hypotensive, will start on peripheral levophed gtt low dose   norepinephrine Infusion 0.05 MICROgram(s)/kG/Min (5.74 mL/Hr) IV Continuous <Continuous>  -Continue home ASA   ===================HEMATOLOGIC/ONC ===================  aspirin  chewable 81 milliGRAM(s) Oral daily  heparin   Injectable 5000 Unit(s) SubCutaneous every 8 hours    ===================== RENAL =========================  -indwelling cath in place   -Check repeat labs and monitor renal function trend  -Avoid hypotension and optimize BP with IVF and pressor support if needed.   - Avoid nephrotoxic meds as possible. Avoid ACEI, ARB and NSAIDS  -Monitor input and output  -montior and replace electrolytes as needed       ==================== GASTROINTESTINAL===================  -stool softeners for chronic constipation   pantoprazole  Injectable 40 milliGRAM(s) IV Push daily  polyethylene glycol 3350 17 Gram(s) Oral every 12 hours PRN Constipation  senna 1 Tablet(s) Oral daily  simethicone 80 milliGRAM(s) Chew two times a day PRN Dyspepsia    =======================    ENDOCRINE  =====================  SSI     ========================INFECTIOUS DISEASE================  -pending Bcx ucx sptum cx   Empiric ABX with zosyn   piperacillin/tazobactam IVPB.. 3.375 Gram(s) IV Intermittent every 8 hours      Patient requires continuous monitoring with bedside rhythm monitoring, pulse oximetry, ventilator/ bipap  monitoring and intermittent blood gas analysis.    Care plan discussed with ICU care team.    Patient remained critical; required more than usual care; I have spent 35 minutes providing non-routine care, revaluated multiple times during the day.     BREA BECKHAM  [id number]  Patient is a 75y old  Female who presents with a chief complaint of   HPI:  75 year old female with a history of HTN, DM, CVA, dementia, chronic respiratory failure s/p trach, PEG, recent cardiac arrest who presents with acute on chronic respiratory failure. The patient is unable to provide any history to me due to advanced dementia and trach. She was found to have respiratory distress at NH so she was sent to ED. Hypoxic in NH was titrated up to 100%. Pt had been agitated in ER was given 0.5 ativan in ER. Pt agitation resolved   Pt trach to vent at this time with new seemingly aspiration/multifacial PNA. Pt in Er was normotensive then recently began to drop BP, additional fluid bolus was given and will start on levophed to maintain MAP>65     Will admit to [facility] for acute on chronic hypoxic respiratory failure, septic shock, aspiration PNA and metabolic encephalopathy           REVIEW OF SYSTEMS:    ROS unable to be obtained pt trach to vent and encephalopathic     Physical Exam:  Vital Signs Last 24 Hrs  T(C): 36.4 (25 Sep 2021 16:50), Max: 38.1 (25 Sep 2021 14:29)  T(F): 97.6 (25 Sep 2021 16:50), Max: 100.6 (25 Sep 2021 14:29)  HR: 78 (25 Sep 2021 20:30) (78 - 116)  BP: 87/39 (25 Sep 2021 20:30) (87/39 - 164/71)  BP(mean): --  RR: 20 (25 Sep 2021 20:30) (18 - 34)  SpO2: 100% (25 Sep 2021 20:30) (96% - 100%)    Gen:  Awake, arousable to deep stimulus   CNS: non focal 	  Neck: no JVD  RES : coarse BS BL and distant at the bases, no wheezing, trach to vent                       CVS: Regular  rhythm. Normal S1/S2  Abd: Soft,  Bowel sounds present. + PEG Distended but soft   Skin: No rash.  Ext:  no edema    ============================I/O===========================   I&O's Detail    ============================ LABS =========================                        10.4   13.61 )-----------( 435      ( 25 Sep 2021 15:01 )             35.0         137  |  100  |  52<H>  ----------------------------<  297<H>  5.3   |  28  |  1.31<H>    Ca    9.7      25 Sep 2021 15:02  Phos  4.5       Mg     4.3         TPro  8.6<H>  /  Alb  2.5<L>  /  TBili  0.5  /  DBili  x   /  AST  41<H>  /  ALT  33  /  AlkPhos  170<H>      LIVER FUNCTIONS - ( 25 Sep 2021 15:02 )  Alb: 2.5 g/dL / Pro: 8.6 g/dL / ALK PHOS: 170 U/L / ALT: 33 U/L DA / AST: 41 U/L / GGT: x           PT/INR - ( 25 Sep 2021 15:02 )   PT: 14.2 sec;   INR: 1.18 ratio         PTT - ( 25 Sep 2021 15:02 )  PTT:45.0 sec    Urinalysis Basic - ( 25 Sep 2021 15:02 )    Color: Yellow / Appearance: Turbid / S.010 / pH: x  Gluc: x / Ketone: Trace  / Bili: Negative / Urobili: Negative mg/dL   Blood: x / Protein: 100 mg/dL / Nitrite: Negative   Leuk Esterase: Moderate / RBC: 6-10 /HPF / WBC 26-50   Sq Epi: x / Non Sq Epi: Few / Bacteria: Many      ======================Micro/Rad/Cardio=================  Culture: pending   CXR: < from: CT Abdomen and Pelvis No Cont (21 @ 19:36) >  IMPRESSION: Motion limited examination. Examination also limited without intravenous contrast.    Dense bilateral lung consolidations. New since the previous exam. Mediastinal lymphadenopathy. Small bilateral effusions and bibasilar atelectasis. Findings may represent infection, correlate clinically and follow to resolution.    Dilated fluid-filled rectum without rectal wall thickening unchanged from previous exam. Remainder of the colon is mildly dilated with some fluid. Unchanged.    --- End of Report ---      < end of copied text >      ======================================================  PAST MEDICAL & SURGICAL HISTORY:  Dementia of frontal lobe type    Aphasic stroke    Diabetes mellitus    Respiratory failure    Hypertension    GERD (gastroesophageal reflux disease)    Constipation    Respiratory failure    CVA (cerebral vascular accident)    HTN (hypertension)    DM (diabetes mellitus)    Advanced dementia    COVID-19 virus detected    Quadriplegia    Pneumonia    Type II diabetes mellitus    Hx of appendectomy    Gastrostomy in place    Tracheostomy in place    Tracheostomy tube present    Feeding by G-tube          ====================ASSESSMENT ==============  1. Acute on chronic hypoxic RF   2. multifocal aspiration PNA  3. septic shock   4. metabolic encephalopathy   5. UTI   6. RYAN     Plan:  ====================== NEUROLOGY=====================  LORazepam   Injectable 0.5 milliGRAM(s) IV Push every 2 hours PRN dyssynchrony and agitation  LORazepam   Injectable 1 milliGRAM(s) IV Push every 6 hours PRN agtation and vent dyssynchrony  -tylenol for fever     ==================== RESPIRATORY======================  Mechanical Ventilation:  Mode: AC/ CMV (Assist Control/ Continuous Mandatory Ventilation)  RR (machine): 18  TV (machine): 400  FiO2: 80  PEEP: 5  PS: 5  ITime: 1  MAP: 12  P-High: 0.4  P-Low: 10  PIP: 28  -trach to vent   -pending ABG   ALBUTerol    90 MICROgram(s) HFA Inhaler 1 Puff(s) Inhalation every 6 hours PRN Shortness of Breath and/or Wheezing  albuterol/ipratropium for Nebulization 3 milliLiter(s) Nebulizer every 6 hours    ====================CARDIOVASCULAR==================  -Pt s/p 2L IVF bolus   -LA trending down, but in ER became hypotensive, will start on peripheral levophed gtt low dose   norepinephrine Infusion 0.05 MICROgram(s)/kG/Min (5.74 mL/Hr) IV Continuous <Continuous>  -Continue home ASA   ===================HEMATOLOGIC/ONC ===================  aspirin  chewable 81 milliGRAM(s) Oral daily  heparin   Injectable 5000 Unit(s) SubCutaneous every 8 hours    ===================== RENAL =========================  -indwelling cath in place   -Check repeat labs and monitor renal function trend  -Avoid hypotension and optimize BP with IVF and pressor support if needed.   - Avoid nephrotoxic meds as possible. Avoid ACEI, ARB and NSAIDS  -Monitor input and output  -montior and replace electrolytes as needed       ==================== GASTROINTESTINAL===================  -stool softeners for chronic constipation   pantoprazole  Injectable 40 milliGRAM(s) IV Push daily  polyethylene glycol 3350 17 Gram(s) Oral every 12 hours PRN Constipation  senna 1 Tablet(s) Oral daily  simethicone 80 milliGRAM(s) Chew two times a day PRN Dyspepsia    =======================    ENDOCRINE  =====================  SSI     ========================INFECTIOUS DISEASE================  -pending Bcx ucx sptum cx   Empiric ABX with zosyn   -UA +  piperacillin/tazobactam IVPB.. 3.375 Gram(s) IV Intermittent every 8 hours      Patient requires continuous monitoring with bedside rhythm monitoring, pulse oximetry, ventilator/ bipap  monitoring and intermittent blood gas analysis.    Care plan discussed with ICU care team.    Patient remained critical; required more than usual care; I have spent 35 minutes providing non-routine care, revaluated multiple times during the day.

## 2021-09-25 NOTE — H&P ADULT - NSHPPHYSICALEXAM_GEN_ALL_CORE
-    Vital Signs Last 24 Hrs  T(C): 37.1 (25 Sep 2021 20:57), Max: 38.1 (25 Sep 2021 14:29)  T(F): 98.7 (25 Sep 2021 20:57), Max: 100.6 (25 Sep 2021 14:29)  HR: 77 (25 Sep 2021 22:00) (77 - 116)  BP: 109/50 (25 Sep 2021 22:00) (87/39 - 164/71)  BP(mean): 69 (25 Sep 2021 22:00) (60 - 69)  RR: 22 (25 Sep 2021 22:00) (18 - 34)  SpO2: 100% (25 Sep 2021 22:00) (60% - 100%)        PHYSICAL EXAM:  		  GENERAL: NAD, well-developed, not currently in any distress.  HEAD:  Atraumatic, Norm cephalic.  EYES: PERRLA, right lateral gaze B/L, conjunctiva clear.  ENMT: no nasal discharge, MMM, unremarkable tracheostomy tube.   NECK: Supple, No JVD.  NERVOUS SYSTEM:  Awake with open eyes but nonverbal, quadriplegic, supine with contracture position, forearms crossed over the chest  CHEST/LUNG: Fair air entry B/L, coarse rales B/L middle & lower lung zones, left > right, no rhonchi, or wheezing.  HEART: Normal S1 & S2, no murmurs, or extra sounds.  ABDOMEN: Soft, non-distended; bowel sounds present, no palpable masses or organomegaly, unremarkable PEG in place.  EXTREMITIES:  No clubbing, cyanosis, or edema, diffuse muscle wasting.  VASCULAR: 2+ radial, DPA / PTA pulses B/L.  SKIN: No rashes, (+) right bid toe tip ulcer, (+) gangrenous left toe tip with eschar.  PSYCH: nor agitation currently (received Ativan IVP earlier at the ED).

## 2021-09-25 NOTE — H&P ADULT - PROBLEM SELECTOR PLAN 2
ML related to the above, and sepsis, responded well to suction, currently sating 100% on FIO2 of 0.6, titrate down, ABG in am, seen earlier by pulmonary/CC Dr. Sandoval.

## 2021-09-25 NOTE — ED ADULT NURSE NOTE - CHIEF COMPLAINT QUOTE
Sent from [facility] with respiratory distress, diaphoretic, pt trached/ vent dependent.

## 2021-09-25 NOTE — H&P ADULT - PROBLEM SELECTOR PLAN 7
uncontrolled, continue on Lantus insulin 36 units at bedtime in addition to corrective insulin Lispro scale coverage before Q 6h, check glycohemoglobin level in am.

## 2021-09-26 LAB
ALBUMIN SERPL ELPH-MCNC: 2.1 G/DL — LOW (ref 3.3–5)
ALP SERPL-CCNC: 138 U/L — HIGH (ref 40–120)
ALT FLD-CCNC: 30 U/L — SIGNIFICANT CHANGE UP (ref 12–78)
ANION GAP SERPL CALC-SCNC: 9 MMOL/L — SIGNIFICANT CHANGE UP (ref 5–17)
AST SERPL-CCNC: 34 U/L — SIGNIFICANT CHANGE UP (ref 15–37)
BASE EXCESS BLDA CALC-SCNC: 1.6 MMOL/L — SIGNIFICANT CHANGE UP (ref -2–3)
BASOPHILS # BLD AUTO: 0.04 K/UL — SIGNIFICANT CHANGE UP (ref 0–0.2)
BASOPHILS NFR BLD AUTO: 0.4 % — SIGNIFICANT CHANGE UP (ref 0–2)
BILIRUB SERPL-MCNC: 0.4 MG/DL — SIGNIFICANT CHANGE UP (ref 0.2–1.2)
BUN SERPL-MCNC: 32 MG/DL — HIGH (ref 7–23)
CALCIUM SERPL-MCNC: 8.5 MG/DL — SIGNIFICANT CHANGE UP (ref 8.5–10.1)
CHLORIDE SERPL-SCNC: 112 MMOL/L — HIGH (ref 96–108)
CO2 SERPL-SCNC: 21 MMOL/L — LOW (ref 22–31)
COVID-19 SPIKE DOMAIN AB INTERP: POSITIVE
COVID-19 SPIKE DOMAIN ANTIBODY RESULT: >250 U/ML — HIGH
CREAT SERPL-MCNC: 1 MG/DL — SIGNIFICANT CHANGE UP (ref 0.5–1.3)
EOSINOPHIL # BLD AUTO: 0.3 K/UL — SIGNIFICANT CHANGE UP (ref 0–0.5)
EOSINOPHIL NFR BLD AUTO: 3.1 % — SIGNIFICANT CHANGE UP (ref 0–6)
GAS PNL BLDA: SIGNIFICANT CHANGE UP
GLUCOSE SERPL-MCNC: 125 MG/DL — HIGH (ref 70–99)
GRAM STN FLD: SIGNIFICANT CHANGE UP
HCO3 BLDA-SCNC: 26 MMOL/L — SIGNIFICANT CHANGE UP (ref 21–28)
HCT VFR BLD CALC: 28.5 % — LOW (ref 34.5–45)
HCT VFR BLD CALC: 31.2 % — LOW (ref 34.5–45)
HGB BLD-MCNC: 8.6 G/DL — LOW (ref 11.5–15.5)
HGB BLD-MCNC: 9.1 G/DL — LOW (ref 11.5–15.5)
HOROWITZ INDEX BLDA+IHG-RTO: 40 — SIGNIFICANT CHANGE UP
IMM GRANULOCYTES NFR BLD AUTO: 1.9 % — HIGH (ref 0–1.5)
INR BLD: 1.12 RATIO — SIGNIFICANT CHANGE UP (ref 0.88–1.16)
LACTATE SERPL-SCNC: 3.7 MMOL/L — HIGH (ref 0.7–2)
LEGIONELLA AG UR QL: NEGATIVE — SIGNIFICANT CHANGE UP
LYMPHOCYTES # BLD AUTO: 3.03 K/UL — SIGNIFICANT CHANGE UP (ref 1–3.3)
LYMPHOCYTES # BLD AUTO: 31.5 % — SIGNIFICANT CHANGE UP (ref 13–44)
MAGNESIUM SERPL-MCNC: 2.9 MG/DL — HIGH (ref 1.6–2.6)
MCHC RBC-ENTMCNC: 27.1 PG — SIGNIFICANT CHANGE UP (ref 27–34)
MCHC RBC-ENTMCNC: 29.2 GM/DL — LOW (ref 32–36)
MCV RBC AUTO: 92.9 FL — SIGNIFICANT CHANGE UP (ref 80–100)
MONOCYTES # BLD AUTO: 0.98 K/UL — HIGH (ref 0–0.9)
MONOCYTES NFR BLD AUTO: 10.2 % — SIGNIFICANT CHANGE UP (ref 2–14)
NEUTROPHILS # BLD AUTO: 5.09 K/UL — SIGNIFICANT CHANGE UP (ref 1.8–7.4)
NEUTROPHILS NFR BLD AUTO: 52.9 % — SIGNIFICANT CHANGE UP (ref 43–77)
NRBC # BLD: 0 /100 WBCS — SIGNIFICANT CHANGE UP (ref 0–0)
OB PNL STL: NEGATIVE — SIGNIFICANT CHANGE UP
PCO2 BLDA: 39 MMHG — HIGH (ref 32–35)
PH BLDA: 7.43 — SIGNIFICANT CHANGE UP (ref 7.35–7.45)
PHOSPHATE SERPL-MCNC: 3 MG/DL — SIGNIFICANT CHANGE UP (ref 2.5–4.5)
PLATELET # BLD AUTO: 424 K/UL — HIGH (ref 150–400)
PO2 BLDA: 76 MMHG — LOW (ref 83–108)
POTASSIUM SERPL-MCNC: 4.7 MMOL/L — SIGNIFICANT CHANGE UP (ref 3.5–5.3)
POTASSIUM SERPL-SCNC: 4.7 MMOL/L — SIGNIFICANT CHANGE UP (ref 3.5–5.3)
PROCALCITONIN SERPL-MCNC: 2.11 NG/ML — HIGH (ref 0.02–0.1)
PROT SERPL-MCNC: 7.6 G/DL — SIGNIFICANT CHANGE UP (ref 6–8.3)
PROTHROM AB SERPL-ACNC: 13.5 SEC — SIGNIFICANT CHANGE UP (ref 10.6–13.6)
RBC # BLD: 3.36 M/UL — LOW (ref 3.8–5.2)
RBC # FLD: 18.3 % — HIGH (ref 10.3–14.5)
SAO2 % BLDA: 97 % — SIGNIFICANT CHANGE UP (ref 94–98)
SARS-COV-2 IGG+IGM SERPL QL IA: >250 U/ML — HIGH
SARS-COV-2 IGG+IGM SERPL QL IA: POSITIVE
SODIUM SERPL-SCNC: 142 MMOL/L — SIGNIFICANT CHANGE UP (ref 135–145)
SPECIMEN SOURCE: SIGNIFICANT CHANGE UP
WBC # BLD: 9.62 K/UL — SIGNIFICANT CHANGE UP (ref 3.8–10.5)
WBC # FLD AUTO: 9.62 K/UL — SIGNIFICANT CHANGE UP (ref 3.8–10.5)

## 2021-09-26 PROCEDURE — 99233 SBSQ HOSP IP/OBS HIGH 50: CPT

## 2021-09-26 RX ORDER — SODIUM CHLORIDE 9 MG/ML
1000 INJECTION, SOLUTION INTRAVENOUS
Refills: 0 | Status: DISCONTINUED | OUTPATIENT
Start: 2021-09-26 | End: 2021-09-27

## 2021-09-26 RX ADMIN — INSULIN GLARGINE 36 UNIT(S): 100 INJECTION, SOLUTION SUBCUTANEOUS at 23:44

## 2021-09-26 RX ADMIN — Medication 2: at 23:43

## 2021-09-26 RX ADMIN — PANTOPRAZOLE SODIUM 40 MILLIGRAM(S): 20 TABLET, DELAYED RELEASE ORAL at 11:13

## 2021-09-26 RX ADMIN — HEPARIN SODIUM 5000 UNIT(S): 5000 INJECTION INTRAVENOUS; SUBCUTANEOUS at 21:30

## 2021-09-26 RX ADMIN — FENTANYL CITRATE 1 PATCH: 50 INJECTION INTRAVENOUS at 07:30

## 2021-09-26 RX ADMIN — PIPERACILLIN AND TAZOBACTAM 25 GRAM(S): 4; .5 INJECTION, POWDER, LYOPHILIZED, FOR SOLUTION INTRAVENOUS at 05:12

## 2021-09-26 RX ADMIN — Medication 1 TABLET(S): at 11:14

## 2021-09-26 RX ADMIN — SENNA PLUS 1 TABLET(S): 8.6 TABLET ORAL at 11:15

## 2021-09-26 RX ADMIN — Medication 1 PUFF(S): at 13:04

## 2021-09-26 RX ADMIN — Medication 2: at 18:08

## 2021-09-26 RX ADMIN — PIPERACILLIN AND TAZOBACTAM 25 GRAM(S): 4; .5 INJECTION, POWDER, LYOPHILIZED, FOR SOLUTION INTRAVENOUS at 13:08

## 2021-09-26 RX ADMIN — SODIUM CHLORIDE 125 MILLILITER(S): 9 INJECTION, SOLUTION INTRAVENOUS at 20:10

## 2021-09-26 RX ADMIN — Medication 2: at 11:17

## 2021-09-26 RX ADMIN — HEPARIN SODIUM 5000 UNIT(S): 5000 INJECTION INTRAVENOUS; SUBCUTANEOUS at 13:08

## 2021-09-26 RX ADMIN — ALBUTEROL 1 PUFF(S): 90 AEROSOL, METERED ORAL at 13:03

## 2021-09-26 RX ADMIN — PIPERACILLIN AND TAZOBACTAM 25 GRAM(S): 4; .5 INJECTION, POWDER, LYOPHILIZED, FOR SOLUTION INTRAVENOUS at 21:30

## 2021-09-26 RX ADMIN — Medication 0.5 MILLIGRAM(S): at 15:55

## 2021-09-26 RX ADMIN — CHLORHEXIDINE GLUCONATE 15 MILLILITER(S): 213 SOLUTION TOPICAL at 05:14

## 2021-09-26 RX ADMIN — Medication 1 PUFF(S): at 07:42

## 2021-09-26 RX ADMIN — Medication 1 PUFF(S): at 19:43

## 2021-09-26 RX ADMIN — SODIUM CHLORIDE 80 MILLILITER(S): 9 INJECTION, SOLUTION INTRAVENOUS at 07:29

## 2021-09-26 RX ADMIN — Medication 1 GRAM(S): at 23:39

## 2021-09-26 RX ADMIN — FENTANYL CITRATE 1 PATCH: 50 INJECTION INTRAVENOUS at 00:12

## 2021-09-26 RX ADMIN — Medication 1 MILLIGRAM(S): at 05:32

## 2021-09-26 RX ADMIN — ALBUTEROL 1 PUFF(S): 90 AEROSOL, METERED ORAL at 07:42

## 2021-09-26 RX ADMIN — Medication 1 GRAM(S): at 05:11

## 2021-09-26 RX ADMIN — METHOCARBAMOL 250 MILLIGRAM(S): 500 TABLET, FILM COATED ORAL at 05:11

## 2021-09-26 RX ADMIN — FENTANYL CITRATE 1 PATCH: 50 INJECTION INTRAVENOUS at 19:20

## 2021-09-26 RX ADMIN — Medication 1 GRAM(S): at 18:03

## 2021-09-26 RX ADMIN — Medication 0.5 MILLIGRAM(S): at 11:34

## 2021-09-26 RX ADMIN — Medication 10 MILLIGRAM(S): at 22:28

## 2021-09-26 RX ADMIN — CHLORHEXIDINE GLUCONATE 15 MILLILITER(S): 213 SOLUTION TOPICAL at 18:03

## 2021-09-26 RX ADMIN — Medication 1 GRAM(S): at 11:13

## 2021-09-26 RX ADMIN — METHOCARBAMOL 250 MILLIGRAM(S): 500 TABLET, FILM COATED ORAL at 18:03

## 2021-09-26 RX ADMIN — Medication 1 MILLIGRAM(S): at 19:49

## 2021-09-26 RX ADMIN — Medication 81 MILLIGRAM(S): at 11:14

## 2021-09-26 RX ADMIN — HEPARIN SODIUM 5000 UNIT(S): 5000 INJECTION INTRAVENOUS; SUBCUTANEOUS at 05:12

## 2021-09-26 NOTE — PROCEDURE NOTE - NSCOMPLICATION_GEN_A_CORE
Pharmacy Vancomycin Consult     Vancomycin Day: 3  Current Dosinmg q12    Temp max:  37    Recent Labs     19  2357 19  0532   BUN 12 8       Recent Labs     19  2357 19  0532   CREATININE 0.65* 0.44*       Recent Labs     19  0532   WBC 7.0         Intake/Output Summary (Last 24 hours) at 3/18/2019 1534  Last data filed at 3/17/2019 1754  Gross per 24 hour   Intake 1100 ml   Output --   Net 1100 ml       Culture Date      Source                       Results  3/16                      wound                        staph aureus    Ht Readings from Last 1 Encounters:   19 5' 9.5\" (1.765 m)        Wt Readings from Last 1 Encounters:   19 179 lb 14.3 oz (81.6 kg)         Body mass index is 26.19 kg/m². Estimated Creatinine Clearance: 148 mL/min (A) (based on SCr of 0.44 mg/dL (L)).     Trough: 10.5    Assessment/Plan:  Continue current regimen.    Fleet Night PharmD, BCPS 3/18/2019 3:35 PM no complications

## 2021-09-26 NOTE — PROGRESS NOTE ADULT - SUBJECTIVE AND OBJECTIVE BOX
Patient is a 75y old  Female who presents with a chief complaint of Respiratory distress. (26 Sep 2021 11:29)      INTERVAL HPI/OVERNIGHT EVENTS:    no overnight events.   at bedside wants vaughn out.  Pt has been having good urine output.     MEDICATIONS  (STANDING):  aspirin  chewable 81 milliGRAM(s) Oral daily  bisacodyl 5 milliGRAM(s) Oral at bedtime  chlorhexidine 0.12% Liquid 15 milliLiter(s) Oral Mucosa every 12 hours  dextrose 40% Gel 15 Gram(s) Oral once  dextrose 5%. 1000 milliLiter(s) (50 mL/Hr) IV Continuous <Continuous>  dextrose 5%. 1000 milliLiter(s) (100 mL/Hr) IV Continuous <Continuous>  dextrose 50% Injectable 25 Gram(s) IV Push once  dextrose 50% Injectable 12.5 Gram(s) IV Push once  dextrose 50% Injectable 25 Gram(s) IV Push once  fentaNYL   Patch  12 MICROgram(s)/Hr 1 Patch Transdermal every 72 hours  glucagon  Injectable 1 milliGRAM(s) IntraMuscular once  heparin   Injectable 5000 Unit(s) SubCutaneous every 8 hours  insulin glargine Injectable (LANTUS) 36 Unit(s) SubCutaneous at bedtime  insulin lispro (ADMELOG) corrective regimen sliding scale   SubCutaneous every 6 hours  ipratropium 17 MICROgram(s) HFA Inhaler 1 Puff(s) Inhalation every 6 hours  lactated ringers. 1000 milliLiter(s) (80 mL/Hr) IV Continuous <Continuous>  lactobacillus acidophilus 1 Tablet(s) Oral daily  methocarbamol 250 milliGRAM(s) Oral two times a day  pantoprazole  Injectable 40 milliGRAM(s) IV Push daily  piperacillin/tazobactam IVPB.. 3.375 Gram(s) IV Intermittent every 8 hours  senna 1 Tablet(s) Oral daily  sucralfate suspension 1 Gram(s) Enteral Tube four times a day    MEDICATIONS  (PRN):  ALBUTerol    90 MICROgram(s) HFA Inhaler 1 Puff(s) Inhalation every 6 hours PRN Shortness of Breath and/or Wheezing  LORazepam   Injectable 0.5 milliGRAM(s) IV Push every 2 hours PRN dyssynchrony and agitation  LORazepam   Injectable 1 milliGRAM(s) IV Push every 6 hours PRN agtation and vent dyssynchrony  polyethylene glycol 3350 17 Gram(s) Oral every 12 hours PRN Constipation  simethicone 80 milliGRAM(s) Chew two times a day PRN Dyspepsia      Allergies    codeine (Hives)    Intolerances      Vital Signs Last 24 Hrs  T(C): 36.9 (26 Sep 2021 06:01), Max: 38.1 (25 Sep 2021 14:29)  T(F): 98.4 (26 Sep 2021 06:01), Max: 100.6 (25 Sep 2021 14:29)  HR: 92 (26 Sep 2021 12:00) (69 - 116)  BP: 98/49 (26 Sep 2021 12:00) (87/39 - 164/71)  BP(mean): 64 (26 Sep 2021 12:00) (60 - 94)  RR: 14 (26 Sep 2021 12:00) (13 - 34)  SpO2: 96% (26 Sep 2021 12:00) (60% - 100%)    PHYSICAL EXAM:  GENERAL: NAD  HEAD:  Atraumatic, Normocephalic  EYES: EOMI, PERRLA, conjunctiva and sclera clear  ENMT: Moist mucous membranes, No lesions; No tonsillar erythema, exudates, or enlargement  NECK: Supple, No JVD, Normal thyroid  NERVOUS SYSTEM:  Alert & Oriented X3, Good concentration; All 4 extremities mobile, no gross sensory deficits.   CHEST/LUNG: Clear to auscultation bilaterally; No rales, rhonchi, wheezing, or rubs  HEART: Regular rate and rhythm; No murmurs, rubs, or gallops  ABDOMEN: Soft, Nontender, Nondistended; Bowel sounds present  EXTREMITIES:  2+ Peripheral Pulses, No clubbing, cyanosis, or edema      LABS:                        8.6    x     )-----------( x        ( 26 Sep 2021 12:00 )             28.5     26 Sep 2021 06:41    142    |  112    |  32     ----------------------------<  125    4.7     |  21     |  1.00     Ca    8.5        26 Sep 2021 06:41  Phos  3.0       26 Sep 2021 06:41  Mg     2.9       26 Sep 2021 06:41    TPro  7.6    /  Alb  2.1    /  TBili  0.4    /  DBili  x      /  AST  34     /  ALT  30     /  AlkPhos  138    26 Sep 2021 06:41    PT/INR - ( 26 Sep 2021 06:41 )   PT: 13.5 sec;   INR: 1.12 ratio         PTT - ( 25 Sep 2021 15:02 )  PTT:45.0 sec  Urinalysis Basic - ( 25 Sep 2021 15:02 )    Color: Yellow / Appearance: Turbid / S.010 / pH: x  Gluc: x / Ketone: Trace  / Bili: Negative / Urobili: Negative mg/dL   Blood: x / Protein: 100 mg/dL / Nitrite: Negative   Leuk Esterase: Moderate / RBC: 6-10 /HPF / WBC 26-50   Sq Epi: x / Non Sq Epi: Few / Bacteria: Many      CAPILLARY BLOOD GLUCOSE      POCT Blood Glucose.: 151 mg/dL (26 Sep 2021 11:11)  POCT Blood Glucose.: 98 mg/dL (26 Sep 2021 05:20)  POCT Blood Glucose.: 163 mg/dL (25 Sep 2021 23:23)  POCT Blood Glucose.: 168 mg/dL (25 Sep 2021 21:27)      RADIOLOGY & ADDITIONAL TESTS:    Imaging Personally Reviewed:  [ ] YES     Consultant(s) Notes Reviewed:      Care Discussed with Consultants/Other Providers:    Advanced Directives: [ ] DNR  [ ] No feeding tube  [ ] MOLST in chart  [ ] MOLST completed today  [ ] Unknown   Patient is a 75y old  Female who presents with a chief complaint of Respiratory distress. (26 Sep 2021 11:29)      INTERVAL HPI/OVERNIGHT EVENTS:    no overnight events.   at bedside wants vaughn out.  Pt has been having good urine output.     MEDICATIONS  (STANDING):  aspirin  chewable 81 milliGRAM(s) Oral daily  bisacodyl 5 milliGRAM(s) Oral at bedtime  chlorhexidine 0.12% Liquid 15 milliLiter(s) Oral Mucosa every 12 hours  dextrose 40% Gel 15 Gram(s) Oral once  dextrose 5%. 1000 milliLiter(s) (50 mL/Hr) IV Continuous <Continuous>  dextrose 5%. 1000 milliLiter(s) (100 mL/Hr) IV Continuous <Continuous>  dextrose 50% Injectable 25 Gram(s) IV Push once  dextrose 50% Injectable 12.5 Gram(s) IV Push once  dextrose 50% Injectable 25 Gram(s) IV Push once  fentaNYL   Patch  12 MICROgram(s)/Hr 1 Patch Transdermal every 72 hours  glucagon  Injectable 1 milliGRAM(s) IntraMuscular once  heparin   Injectable 5000 Unit(s) SubCutaneous every 8 hours  insulin glargine Injectable (LANTUS) 36 Unit(s) SubCutaneous at bedtime  insulin lispro (ADMELOG) corrective regimen sliding scale   SubCutaneous every 6 hours  ipratropium 17 MICROgram(s) HFA Inhaler 1 Puff(s) Inhalation every 6 hours  lactated ringers. 1000 milliLiter(s) (80 mL/Hr) IV Continuous <Continuous>  lactobacillus acidophilus 1 Tablet(s) Oral daily  methocarbamol 250 milliGRAM(s) Oral two times a day  pantoprazole  Injectable 40 milliGRAM(s) IV Push daily  piperacillin/tazobactam IVPB.. 3.375 Gram(s) IV Intermittent every 8 hours  senna 1 Tablet(s) Oral daily  sucralfate suspension 1 Gram(s) Enteral Tube four times a day    MEDICATIONS  (PRN):  ALBUTerol    90 MICROgram(s) HFA Inhaler 1 Puff(s) Inhalation every 6 hours PRN Shortness of Breath and/or Wheezing  LORazepam   Injectable 0.5 milliGRAM(s) IV Push every 2 hours PRN dyssynchrony and agitation  LORazepam   Injectable 1 milliGRAM(s) IV Push every 6 hours PRN agtation and vent dyssynchrony  polyethylene glycol 3350 17 Gram(s) Oral every 12 hours PRN Constipation  simethicone 80 milliGRAM(s) Chew two times a day PRN Dyspepsia      Allergies    codeine (Hives)    Intolerances      Vital Signs Last 24 Hrs  T(C): 36.9 (26 Sep 2021 06:01), Max: 38.1 (25 Sep 2021 14:29)  T(F): 98.4 (26 Sep 2021 06:01), Max: 100.6 (25 Sep 2021 14:29)  HR: 92 (26 Sep 2021 12:00) (69 - 116)  BP: 98/49 (26 Sep 2021 12:00) (87/39 - 164/71)  BP(mean): 64 (26 Sep 2021 12:00) (60 - 94)  RR: 14 (26 Sep 2021 12:00) (13 - 34)  SpO2: 96% (26 Sep 2021 12:00) (60% - 100%)    PHYSICAL EXAM:  GENERAL: NAD, well-developed, not currently in any distress.  HEAD:  Atraumatic, Norm cephalic.  EYES: PERRLA, right lateral gaze B/L, conjunctiva clear.  ENMT: no nasal discharge, MMM, unremarkable tracheostomy tube.   NECK: Supple, No JVD.  NERVOUS SYSTEM:  Awake with open eyes but nonverbal, quadriplegic, supine with contracture position, forearms crossed over the chest  CHEST/LUNG: Fair air entry B/L, coarse rales B/L middle & lower lung zones, left > right, no rhonchi, or wheezing.  HEART: Normal S1 & S2, no murmurs, or extra sounds.  ABDOMEN: Soft, non-distended; bowel sounds present, no palpable masses or organomegaly, unremarkable PEG in place.  EXTREMITIES:  No clubbing, cyanosis, or edema, diffuse muscle wasting.  VASCULAR: 2+ radial, DPA / PTA pulses B/L.  SKIN: No rashes, (+) right bid toe tip ulcer, (+) gangrenous left toe tip with eschar.  PSYCH: nor agitation currently (received Ativan IVP earlier at the ED).      LABS:                        8.6    x     )-----------( x        ( 26 Sep 2021 12:00 )             28.5     26 Sep 2021 06:41    142    |  112    |  32     ----------------------------<  125    4.7     |  21     |  1.00     Ca    8.5        26 Sep 2021 06:41  Phos  3.0       26 Sep 2021 06:41  Mg     2.9       26 Sep 2021 06:41    TPro  7.6    /  Alb  2.1    /  TBili  0.4    /  DBili  x      /  AST  34     /  ALT  30     /  AlkPhos  138    26 Sep 2021 06:41    PT/INR - ( 26 Sep 2021 06:41 )   PT: 13.5 sec;   INR: 1.12 ratio         PTT - ( 25 Sep 2021 15:02 )  PTT:45.0 sec  Urinalysis Basic - ( 25 Sep 2021 15:02 )    Color: Yellow / Appearance: Turbid / S.010 / pH: x  Gluc: x / Ketone: Trace  / Bili: Negative / Urobili: Negative mg/dL   Blood: x / Protein: 100 mg/dL / Nitrite: Negative   Leuk Esterase: Moderate / RBC: 6-10 /HPF / WBC 26-50   Sq Epi: x / Non Sq Epi: Few / Bacteria: Many      CAPILLARY BLOOD GLUCOSE      POCT Blood Glucose.: 151 mg/dL (26 Sep 2021 11:11)  POCT Blood Glucose.: 98 mg/dL (26 Sep 2021 05:20)  POCT Blood Glucose.: 163 mg/dL (25 Sep 2021 23:23)  POCT Blood Glucose.: 168 mg/dL (25 Sep 2021 21:27)      RADIOLOGY & ADDITIONAL TESTS:    Imaging Personally Reviewed:  [ ] YES     Consultant(s) Notes Reviewed:      Care Discussed with Consultants/Other Providers:    Advanced Directives: [ ] DNR  [ ] No feeding tube  [ ] MOLST in chart  [ ] MOLST completed today  [ ] Unknown

## 2021-09-26 NOTE — DIETITIAN INITIAL EVALUATION ADULT. - PERTINENT MEDS FT
MEDICATIONS  (STANDING):  aspirin  chewable 81 milliGRAM(s) Oral daily  bisacodyl 5 milliGRAM(s) Oral at bedtime  chlorhexidine 0.12% Liquid 15 milliLiter(s) Oral Mucosa every 12 hours  dextrose 40% Gel 15 Gram(s) Oral once  dextrose 5%. 1000 milliLiter(s) (50 mL/Hr) IV Continuous <Continuous>  dextrose 5%. 1000 milliLiter(s) (100 mL/Hr) IV Continuous <Continuous>  dextrose 50% Injectable 25 Gram(s) IV Push once  dextrose 50% Injectable 12.5 Gram(s) IV Push once  dextrose 50% Injectable 25 Gram(s) IV Push once  fentaNYL   Patch  12 MICROgram(s)/Hr 1 Patch Transdermal every 72 hours  glucagon  Injectable 1 milliGRAM(s) IntraMuscular once  heparin   Injectable 5000 Unit(s) SubCutaneous every 8 hours  insulin glargine Injectable (LANTUS) 36 Unit(s) SubCutaneous at bedtime  insulin lispro (ADMELOG) corrective regimen sliding scale   SubCutaneous every 6 hours  ipratropium 17 MICROgram(s) HFA Inhaler 1 Puff(s) Inhalation every 6 hours  lactated ringers. 1000 milliLiter(s) (80 mL/Hr) IV Continuous <Continuous>  lactobacillus acidophilus 1 Tablet(s) Oral daily  methocarbamol 250 milliGRAM(s) Oral two times a day  pantoprazole  Injectable 40 milliGRAM(s) IV Push daily  piperacillin/tazobactam IVPB.. 3.375 Gram(s) IV Intermittent every 8 hours  senna 1 Tablet(s) Oral daily  sucralfate suspension 1 Gram(s) Enteral Tube four times a day    MEDICATIONS  (PRN):  ALBUTerol    90 MICROgram(s) HFA Inhaler 1 Puff(s) Inhalation every 6 hours PRN Shortness of Breath and/or Wheezing  LORazepam   Injectable 0.5 milliGRAM(s) IV Push every 2 hours PRN dyssynchrony and agitation  LORazepam   Injectable 1 milliGRAM(s) IV Push every 6 hours PRN agtation and vent dyssynchrony  polyethylene glycol 3350 17 Gram(s) Oral every 12 hours PRN Constipation  simethicone 80 milliGRAM(s) Chew two times a day PRN Dyspepsia

## 2021-09-26 NOTE — PROGRESS NOTE ADULT - ASSESSMENT
The patient is a 75 year old female with a history of HTN, DM, CVA, dementia, chronic respiratory failure s/p trach, PEG, recent cardiac arrest who presents with acute on chronic respiratory failure.     Plan:  - CXR and CT chest with bilateral infiltrates, left greater than right, and bilateral pleural effusions  - Echo last admission with normal LV systolic function, mild pulm HTN  - Aspirin on hold due to prior anemia and bleed  - On zosyn  - On IV fluids  - Mechanical ventilation  - Overall poor prognosis - comfort care would be most appropriate

## 2021-09-26 NOTE — PROGRESS NOTE ADULT - ASSESSMENT
CHAPINCITO GUIDRY 75 f [facility] S 9/24/2021   DR ILIANA KEMP     REVIEW OF SYMPTOMS      Able to give (reliable) ROS  NO     PHYSICAL EXAM    HEENT Unremarkable  atraumatic   RESP Fair air entry EXP prolonged    Harsh breath sound Resp distres mild   CARDIAC S1 S2 No S3     NO JVD    ABDOMEN SOFT BS PRESENT NOT DISTENDED No hepatosplenomegaly   PEDAL EDEMA present No calf tenderness  NO rash                                    PATIENT PRESENTATION.  74 f [facility] OBS cva chr vdrf peg past ho vap past ho pseudomonas sp recently  admitted 9/7-9/15 sp cac asp pneu  Rxed with zosyn  Shock and lacticemia poa resolvd   Had sz 9/9 Noted to have anemia   Has toe lesion seen by podiatry 9/10  Patient now readmitted 9/25/2021 with resp distress   On arrival 120/57 116 24 o vent      PROBLEMS.        VAP poa  UTI poa  VDRF ([facility])  PEG ([facility])    EVENTS.    9/26/2021 tf started       BEST PRACTICE ISSUES.                                                  HEAD OF BED ELEVATION. Yes  DVT PROPHYLAXIS.   hpsc 9/26/2021                                       SQUIRES PROPHYLAXIS.  carafate (9/25)                                                                                         DIET.            jevity 1.5 30/h 9/26/2021 gt               INFECTION PROPHYLAXIS.   chlorhexidine .12% 9/26/2021                      GENERAL ISSUES  GOC ADVANCED DIRECTIVE.                                         ALLERGY.  codeine                            WEIGHT.      9/25/2021 61                              BMI.                 9/25/2021 22       PATIENT DATA   TUBES  PROCEDURES.     poa Trach  poa peg    ABG.     OXYGENATION.                   VITALS/IO/VENT/DRIPS.    9/26/2021 afeb 89 130/60   9/26/2021 ac 18/400/5/50      PROBLEM ASSESSMENT/RECOMMENDATIONS.  COVID STATUS.  scv2 9/25/2021 (-)  spk ab 9/26/2021 (+) 250   RESP.  VDRF.   Lung protective ventilation  target po 90-95%  HEMODYNAMICS.   Bp 120/50 poa  lr 80 (9/25)   target map 65 (+)  LACTICEMIA.   la 9/25-9/25 la 5.8-1.4  iv fluids   COPD.  prov p (9/25)  atrov (9/25)     INFECTION.  VAP.   Pssible UTI.  W 9/25-9/26/2021 w 13-9.6   pr 9/26/2021 pr 2.1   ua 9/25/2021 w 26-50 l estrase mod   ct cap 9/25/2021 dense bl cpnsolidations new mediastinal lne Small bl effs dilated fluid filled rectum   rvp 9/25/2021 (-)   legn 9/26/2021 (-)   sp 9/25 rare gnr   zosyn 9?25  ANEMIA.  Hb 9/25-9/26/2021 hb 10 - 9.1   RYAN.  Cr 9/25-9/26/2021 cr 1.3-1   ELEVATED LFTS.  LFTS 9/25/2021  ap 170  ast 41  alt 33     OVERALL PLAN.  VDRF anoxic encephalopathy patient  admitted 9/25/2021 with VAP UTI resp distress started on bsab      TIME SPENT   Over 36 minutes aggregate critical care time spent on encounter; activities included   direct patient care, counseling and/or coordinating care reviewing notes, lab data/ imaging , discussion with multidisciplinary team/ patient  /family and explaining in detail risks, benefits, alternatives  of the recommendations     CHAPINCITO GUIDRY 75 [address] [facility] S 9/24/2021   DR ILIANA KEMP

## 2021-09-26 NOTE — PROGRESS NOTE ADULT - SUBJECTIVE AND OBJECTIVE BOX
BREA BECKHAM    [facility]    Allergies    codeine (Hives)    Intolerances        PAST MEDICAL & SURGICAL HISTORY:  Dementia of frontal lobe type    Aphasic stroke    Diabetes mellitus    Respiratory failure    Hypertension    GERD (gastroesophageal reflux disease)    Constipation    Respiratory failure    CVA (cerebral vascular accident)    HTN (hypertension)    DM (diabetes mellitus)    Advanced dementia    COVID-19 virus detected    Quadriplegia    Pneumonia    Type II diabetes mellitus    Hx of appendectomy    Gastrostomy in place    Tracheostomy in place    Tracheostomy tube present    Feeding by G-tube        FAMILY HISTORY:  No pertinent family history in first degree relatives        Home Medications:  acetaminophen 160 mg/5 mL oral suspension: 20.31 milliliter(s) by gastrostomy tube every 6 hours, As Needed (2021 09:08)  albuterol 90 mcg/inh inhalation aerosol: 2 puff(s) inhaled every 6 hours (2021 09:08)  aspirin 81 mg oral tablet, chewable: 1 tab(s) by PEG tube once a day (15 Zay 2021 15:07)  Bacid (LAC) oral tablet: 2 tab(s) by gastrostomy tube once a day (2021 09:08)  bisacodyl 5 mg oral delayed release tablet: 1 tab(s) orally once a day (at bedtime) (2021 09:08)  Carafate 1 g/10 mL oral suspension: 10 milliliter(s) by gastrostomy tube 4 times a day (before meals and at bedtime) for 14 days (Started 21) (15 Zay 2021 15:07)  chlorhexidine 0.12% mucous membrane liquid: 15 milliliter(s) mucous membrane 2 times a day (15 Sep 2021 12:08)  insulin glargine: 36 unit(s) subcutaneous once a day (at bedtime) (15 Sep 2021 12:08)  ipratropium-albuterol 0.5 mg-2.5 mg/3 mL inhalation solution: 3 milliliter(s) inhaled 4 times a day (15 Zay 2021 15:07)  LORazepam 0.5 mg oral tablet: 1 tab(s) orally every 2 hours, As needed, Agitation (15 Sep 2021 12:08)  LORazepam 1 mg oral tablet: 1 tab(s) orally every 6 hours (15 Sep 2021 12:08)  methocarbamol: 250 milligram(s) by gastrostomy tube 2 times a day (15 Sep 2021 12:08)  pantoprazole 40 mg oral granule, delayed release: 40 milligram(s) by gastrostomy tube 2 times a day (15 Sep 2021 12:08)  polyethylene glycol 3350 oral powder for reconstitution: 17 gram(s) orally every 12 hours (2021 09:08)  senna 8.6 mg oral tablet: 1 tab(s) by gastrostomy tube once a day (at bedtime) (2021 09:08)  simethicone 80 mg oral tablet, chewable: 1 tab(s) orally every 6 hours (15 Sep 2021 12:08)      MEDICATIONS  (STANDING):  aspirin  chewable 81 milliGRAM(s) Oral daily  bisacodyl 5 milliGRAM(s) Oral at bedtime  chlorhexidine 0.12% Liquid 15 milliLiter(s) Oral Mucosa every 12 hours  dextrose 40% Gel 15 Gram(s) Oral once  dextrose 5%. 1000 milliLiter(s) (50 mL/Hr) IV Continuous <Continuous>  dextrose 5%. 1000 milliLiter(s) (100 mL/Hr) IV Continuous <Continuous>  dextrose 50% Injectable 25 Gram(s) IV Push once  dextrose 50% Injectable 12.5 Gram(s) IV Push once  dextrose 50% Injectable 25 Gram(s) IV Push once  fentaNYL   Patch  12 MICROgram(s)/Hr 1 Patch Transdermal every 72 hours  glucagon  Injectable 1 milliGRAM(s) IntraMuscular once  heparin   Injectable 5000 Unit(s) SubCutaneous every 8 hours  insulin glargine Injectable (LANTUS) 36 Unit(s) SubCutaneous at bedtime  insulin lispro (ADMELOG) corrective regimen sliding scale   SubCutaneous every 6 hours  ipratropium 17 MICROgram(s) HFA Inhaler 1 Puff(s) Inhalation every 6 hours  lactated ringers. 1000 milliLiter(s) (80 mL/Hr) IV Continuous <Continuous>  lactobacillus acidophilus 1 Tablet(s) Oral daily  methocarbamol 250 milliGRAM(s) Oral two times a day  pantoprazole  Injectable 40 milliGRAM(s) IV Push daily  piperacillin/tazobactam IVPB.. 3.375 Gram(s) IV Intermittent every 8 hours  senna 1 Tablet(s) Oral daily  sucralfate suspension 1 Gram(s) Enteral Tube four times a day    MEDICATIONS  (PRN):  ALBUTerol    90 MICROgram(s) HFA Inhaler 1 Puff(s) Inhalation every 6 hours PRN Shortness of Breath and/or Wheezing  LORazepam   Injectable 0.5 milliGRAM(s) IV Push every 2 hours PRN dyssynchrony and agitation  LORazepam   Injectable 1 milliGRAM(s) IV Push every 6 hours PRN agtation and vent dyssynchrony  polyethylene glycol 3350 17 Gram(s) Oral every 12 hours PRN Constipation  simethicone 80 milliGRAM(s) Chew two times a day PRN Dyspepsia              Vital Signs Last 24 Hrs  T(C): 36.9 (26 Sep 2021 06:01), Max: 38.1 (25 Sep 2021 14:29)  T(F): 98.4 (26 Sep 2021 06:01), Max: 100.6 (25 Sep 2021 14:29)  HR: 82 (26 Sep 2021 10:00) (69 - 116)  BP: 109/51 (26 Sep 2021 10:00) (87/39 - 164/71)  BP(mean): 69 (26 Sep 2021 10:00) (60 - 94)  RR: 26 (26 Sep 2021 10:00) (13 - 34)  SpO2: 100% (26 Sep 2021 10:00) (60% - 100%)      21 @ 07:  -  21 @ 07:00  --------------------------------------------------------  IN: 1080 mL / OUT: 750 mL / NET: 330 mL    21 @ 07:01  -  21 @ 11:29  --------------------------------------------------------  IN: 320 mL / OUT: 0 mL / NET: 320 mL        Mode: AC/ CMV (Assist Control/ Continuous Mandatory Ventilation), RR (machine): 18, TV (machine): 400, FiO2: 50, PEEP: 5, PS: 5, ITime: 1, MAP: 12, P-High: 0.4, P-Low: 10, PIP: 36      LABS:                        9.1    9.62  )-----------( 424      ( 26 Sep 2021 06:41 )             31.2         142  |  112<H>  |  32<H>  ----------------------------<  125<H>  4.7   |  21<L>  |  1.00    Ca    8.5      26 Sep 2021 06:41  Phos  3.0       Mg     2.9         TPro  7.6  /  Alb  2.1<L>  /  TBili  0.4  /  DBili  x   /  AST  34  /  ALT  30  /  AlkPhos  138<H>      PT/INR - ( 26 Sep 2021 06:41 )   PT: 13.5 sec;   INR: 1.12 ratio         PTT - ( 25 Sep 2021 15:02 )  PTT:45.0 sec  Urinalysis Basic - ( 25 Sep 2021 15:02 )    Color: Yellow / Appearance: Turbid / S.010 / pH: x  Gluc: x / Ketone: Trace  / Bili: Negative / Urobili: Negative mg/dL   Blood: x / Protein: 100 mg/dL / Nitrite: Negative   Leuk Esterase: Moderate / RBC: 6-10 /HPF / WBC 26-50   Sq Epi: x / Non Sq Epi: Few / Bacteria: Many            WBC:  WBC Count: 9.62 K/uL ( @ 06:41)  WBC Count: 13.61 K/uL ( @ 15:01)      MICROBIOLOGY:  RECENT CULTURES:              PT/INR - ( 26 Sep 2021 06:41 )   PT: 13.5 sec;   INR: 1.12 ratio         PTT - ( 25 Sep 2021 15:02 )  PTT:45.0 sec    Sodium:  Sodium, Serum: 142 mmol/L ( @ 06:41)  Sodium, Serum: 137 mmol/L ( @ 15:02)      1.00 mg/dL  @ 06:41  1.31 mg/dL  @ 15:02      Hemoglobin:  Hemoglobin: 9.1 g/dL ( @ 06:41)  Hemoglobin: 10.4 g/dL ( @ 15:01)      Platelets: Platelet Count - Automated: 424 K/uL ( @ 06:41)  Platelet Count - Automated: 435 K/uL ( @ 15:01)      LIVER FUNCTIONS - ( 26 Sep 2021 06:41 )  Alb: 2.1 g/dL / Pro: 7.6 g/dL / ALK PHOS: 138 U/L / ALT: 30 U/L / AST: 34 U/L / GGT: x             Urinalysis Basic - ( 25 Sep 2021 15:02 )    Color: Yellow / Appearance: Turbid / S.010 / pH: x  Gluc: x / Ketone: Trace  / Bili: Negative / Urobili: Negative mg/dL   Blood: x / Protein: 100 mg/dL / Nitrite: Negative   Leuk Esterase: Moderate / RBC: 6-10 /HPF / WBC 26-50   Sq Epi: x / Non Sq Epi: Few / Bacteria: Many        RADIOLOGY & ADDITIONAL STUDIES:      MICROBIOLOGY:  RECENT CULTURES:

## 2021-09-26 NOTE — PROGRESS NOTE ADULT - SUBJECTIVE AND OBJECTIVE BOX
Patient is a 75y Female whom presented to the hospital with ckd and manasa     PAST MEDICAL & SURGICAL HISTORY:  Dementia of frontal lobe type    Aphasic stroke    Diabetes mellitus    Respiratory failure    Hypertension    GERD (gastroesophageal reflux disease)    Constipation    Respiratory failure    CVA (cerebral vascular accident)    HTN (hypertension)    DM (diabetes mellitus)    Advanced dementia    COVID-19 virus detected    Quadriplegia    Pneumonia    Type II diabetes mellitus    Hx of appendectomy    Gastrostomy in place    Tracheostomy in place    Tracheostomy tube present    Feeding by G-tube        MEDICATIONS  (STANDING):  aspirin  chewable 81 milliGRAM(s) Oral daily  chlorhexidine 0.12% Liquid 15 milliLiter(s) Oral Mucosa every 12 hours  chlorhexidine 4% Liquid 1 Application(s) Topical <User Schedule>  dextrose 40% Gel 15 Gram(s) Oral once  dextrose 5%. 1000 milliLiter(s) (50 mL/Hr) IV Continuous <Continuous>  dextrose 5%. 1000 milliLiter(s) (100 mL/Hr) IV Continuous <Continuous>  dextrose 50% Injectable 25 Gram(s) IV Push once  dextrose 50% Injectable 12.5 Gram(s) IV Push once  dextrose 50% Injectable 25 Gram(s) IV Push once  glucagon  Injectable 1 milliGRAM(s) IntraMuscular once  heparin   Injectable 5000 Unit(s) SubCutaneous every 8 hours  insulin lispro (ADMELOG) corrective regimen sliding scale   SubCutaneous every 6 hours  ipratropium 17 MICROgram(s) HFA Inhaler 1 Puff(s) Inhalation every 6 hours  lactated ringers. 1000 milliLiter(s) (80 mL/Hr) IV Continuous <Continuous>  lactobacillus acidophilus 1 Tablet(s) Oral daily  norepinephrine Infusion 0.05 MICROgram(s)/kG/Min (5.74 mL/Hr) IV Continuous <Continuous>  pantoprazole  Injectable 40 milliGRAM(s) IV Push daily  piperacillin/tazobactam IVPB.. 3.375 Gram(s) IV Intermittent every 8 hours  senna 1 Tablet(s) Oral daily      Allergies    codeine (Hives)    Intolerances        SOCIAL HISTORY:  Denies ETOh,Smoking,     FAMILY HISTORY:  No pertinent family history in first degree relatives        REVIEW OF SYSTEMS:  unable to obtained a good review system                            8.6    x     )-----------( x        ( 26 Sep 2021 12:00 )             28.5       CBC Full  -  ( 26 Sep 2021 12:00 )  WBC Count : x  RBC Count : x  Hemoglobin : 8.6 g/dL  Hematocrit : 28.5 %  Platelet Count - Automated : x  Mean Cell Volume : x  Mean Cell Hemoglobin : x  Mean Cell Hemoglobin Concentration : x  Auto Neutrophil # : x  Auto Lymphocyte # : x  Auto Monocyte # : x  Auto Eosinophil # : x  Auto Basophil # : x  Auto Neutrophil % : x  Auto Lymphocyte % : x  Auto Monocyte % : x  Auto Eosinophil % : x  Auto Basophil % : x          142  |  112<H>  |  32<H>  ----------------------------<  125<H>  4.7   |  21<L>  |  1.00    Ca    8.5      26 Sep 2021 06:41  Phos  3.0       Mg     2.9         TPro  7.6  /  Alb  2.1<L>  /  TBili  0.4  /  DBili  x   /  AST  34  /  ALT  30  /  AlkPhos  138<H>        CAPILLARY BLOOD GLUCOSE      POCT Blood Glucose.: 151 mg/dL (26 Sep 2021 11:11)  POCT Blood Glucose.: 98 mg/dL (26 Sep 2021 05:20)  POCT Blood Glucose.: 163 mg/dL (25 Sep 2021 23:23)  POCT Blood Glucose.: 168 mg/dL (25 Sep 2021 21:27)      Vital Signs Last 24 Hrs  T(C): 36.7 (26 Sep 2021 15:59), Max: 37.1 (25 Sep 2021 20:57)  T(F): 98 (26 Sep 2021 15:59), Max: 98.7 (25 Sep 2021 20:57)  HR: 100 (26 Sep 2021 16:00) (69 - 104)  BP: 139/66 (26 Sep 2021 16:00) (87/39 - 160/64)  BP(mean): 88 (26 Sep 2021 16:00) (60 - 94)  RR: 19 (26 Sep 2021 16:00) (13 - 33)  SpO2: 98% (26 Sep 2021 16:00) (60% - 100%)    Urinalysis Basic - ( 25 Sep 2021 15:02 )    Color: Yellow / Appearance: Turbid / S.010 / pH: x  Gluc: x / Ketone: Trace  / Bili: Negative / Urobili: Negative mg/dL   Blood: x / Protein: 100 mg/dL / Nitrite: Negative   Leuk Esterase: Moderate / RBC: 6-10 /HPF / WBC 26-50   Sq Epi: x / Non Sq Epi: Few / Bacteria: Many        PT/INR - ( 26 Sep 2021 06:41 )   PT: 13.5 sec;   INR: 1.12 ratio         PTT - ( 25 Sep 2021 15:02 )  PTT:45.0 sec      PHYSICAL EXAM:    Constitutional: NAD  HEENT: conjunctive   clear   Neck:  No JVD  Respiratory: decrease bs b/l   Cardiovascular: S1 and S2  Gastrointestinal: BS+, soft, pos peg   Extremities: No peripheral edema

## 2021-09-26 NOTE — PROGRESS NOTE ADULT - ASSESSMENT
74 y/o F with PMH of HTN, DM type 2, CVA, recent Cardiac Arrest, Chronic Respiratory Failure, Vent Dependant s/p trach & PEG, Anemia with GI blood loss, and Dementia presented with acute respiratory distress.

## 2021-09-26 NOTE — DIETITIAN INITIAL EVALUATION ADULT. - PERTINENT LABORATORY DATA
(9/26) H/H 9.1/31.2, BUN 52, Cr 1.31, Glu 297, Alb 2.5, Alk Phos 170, AST 41, GFR 54, Mg 4.3, lipase 69    A1c: 6.9% (9/8)    CAPILLARY BLOOD GLUCOSE:  POCT Blood Glucose.: 98 mg/dL (26 Sep 2021 05:20)  POCT Blood Glucose.: 163 mg/dL (25 Sep 2021 23:23)  POCT Blood Glucose.: 168 mg/dL (25 Sep 2021 21:27)

## 2021-09-26 NOTE — PROGRESS NOTE ADULT - SUBJECTIVE AND OBJECTIVE BOX
Date/Time Patient Seen:  		  Referring MD:   Data Reviewed	       Patient is a 75y old  Female who presents with a chief complaint of Respiratory distress. (25 Sep 2021 22:13)      Subjective/HPI     PAST MEDICAL & SURGICAL HISTORY:  Dementia of frontal lobe type    Aphasic stroke    Diabetes mellitus    Respiratory failure    Hypertension    GERD (gastroesophageal reflux disease)    Constipation    Respiratory failure    CVA (cerebral vascular accident)    HTN (hypertension)    DM (diabetes mellitus)    Advanced dementia    COVID-19 virus detected    Quadriplegia    Pneumonia    Type II diabetes mellitus    Hx of appendectomy    Gastrostomy in place    Tracheostomy in place    Tracheostomy tube present    Feeding by G-tube          Medication list         MEDICATIONS  (STANDING):  aspirin  chewable 81 milliGRAM(s) Oral daily  bisacodyl 5 milliGRAM(s) Oral at bedtime  chlorhexidine 0.12% Liquid 15 milliLiter(s) Oral Mucosa every 12 hours  dextrose 40% Gel 15 Gram(s) Oral once  dextrose 5%. 1000 milliLiter(s) (50 mL/Hr) IV Continuous <Continuous>  dextrose 5%. 1000 milliLiter(s) (100 mL/Hr) IV Continuous <Continuous>  dextrose 50% Injectable 25 Gram(s) IV Push once  dextrose 50% Injectable 12.5 Gram(s) IV Push once  dextrose 50% Injectable 25 Gram(s) IV Push once  fentaNYL   Patch  12 MICROgram(s)/Hr 1 Patch Transdermal every 72 hours  glucagon  Injectable 1 milliGRAM(s) IntraMuscular once  heparin   Injectable 5000 Unit(s) SubCutaneous every 8 hours  insulin glargine Injectable (LANTUS) 36 Unit(s) SubCutaneous at bedtime  insulin lispro (ADMELOG) corrective regimen sliding scale   SubCutaneous every 6 hours  ipratropium 17 MICROgram(s) HFA Inhaler 1 Puff(s) Inhalation every 6 hours  lactated ringers. 1000 milliLiter(s) (80 mL/Hr) IV Continuous <Continuous>  lactobacillus acidophilus 1 Tablet(s) Oral daily  methocarbamol 250 milliGRAM(s) Oral two times a day  pantoprazole  Injectable 40 milliGRAM(s) IV Push daily  piperacillin/tazobactam IVPB.. 3.375 Gram(s) IV Intermittent every 8 hours  senna 1 Tablet(s) Oral daily  sucralfate suspension 1 Gram(s) Enteral Tube four times a day    MEDICATIONS  (PRN):  ALBUTerol    90 MICROgram(s) HFA Inhaler 1 Puff(s) Inhalation every 6 hours PRN Shortness of Breath and/or Wheezing  LORazepam   Injectable 0.5 milliGRAM(s) IV Push every 2 hours PRN dyssynchrony and agitation  LORazepam   Injectable 1 milliGRAM(s) IV Push every 6 hours PRN agtation and vent dyssynchrony  polyethylene glycol 3350 17 Gram(s) Oral every 12 hours PRN Constipation  simethicone 80 milliGRAM(s) Chew two times a day PRN Dyspepsia         Vitals log        ICU Vital Signs Last 24 Hrs  T(C): 36.9 (26 Sep 2021 06:01), Max: 38.1 (25 Sep 2021 14:29)  T(F): 98.4 (26 Sep 2021 06:01), Max: 100.6 (25 Sep 2021 14:29)  HR: 86 (26 Sep 2021 08:00) (69 - 116)  BP: 124/66 (26 Sep 2021 08:00) (87/39 - 164/71)  BP(mean): 83 (26 Sep 2021 08:00) (60 - 94)  ABP: --  ABP(mean): --  RR: 22 (26 Sep 2021 08:00) (13 - 34)  SpO2: 100% (26 Sep 2021 08:00) (60% - 100%)       Mode: AC/ CMV (Assist Control/ Continuous Mandatory Ventilation)  RR (machine): 18  TV (machine): 400  FiO2: 50  PEEP: 5  PS: 5  ITime: 1  MAP: 12  P-High: 0.4  P-Low: 10  PIP: 36      Input and Output:  I&O's Detail    25 Sep 2021 07:01  -  26 Sep 2021 07:00  --------------------------------------------------------  IN:    IV PiggyBack: 200 mL    Lactated Ringers: 880 mL  Total IN: 1080 mL    OUT:    Indwelling Catheter - Urethral (mL): 750 mL    Norepinephrine: 0 mL  Total OUT: 750 mL    Total NET: 330 mL      26 Sep 2021 07:01  -  26 Sep 2021 08:33  --------------------------------------------------------  IN:    Lactated Ringers: 160 mL  Total IN: 160 mL    OUT:  Total OUT: 0 mL    Total NET: 160 mL          Lab Data                        9.1    9.62  )-----------( 424      ( 26 Sep 2021 06:41 )             31.2     09-25    137  |  100  |  52<H>  ----------------------------<  297<H>  5.3   |  28  |  1.31<H>    Ca    9.7      25 Sep 2021 15:02  Phos  4.5     09-25  Mg     4.3     09-25    TPro  8.6<H>  /  Alb  2.5<L>  /  TBili  0.5  /  DBili  x   /  AST  41<H>  /  ALT  33  /  AlkPhos  170<H>  09-25            Review of Systems	      Objective     Physical Examination    heart s1s2  lung dec BS  abd soft      Pertinent Lab findings & Imaging      Annalise:  NO   Adequate UO     I&O's Detail    25 Sep 2021 07:01  -  26 Sep 2021 07:00  --------------------------------------------------------  IN:    IV PiggyBack: 200 mL    Lactated Ringers: 880 mL  Total IN: 1080 mL    OUT:    Indwelling Catheter - Urethral (mL): 750 mL    Norepinephrine: 0 mL  Total OUT: 750 mL    Total NET: 330 mL      26 Sep 2021 07:01  -  26 Sep 2021 08:33  --------------------------------------------------------  IN:    Lactated Ringers: 160 mL  Total IN: 160 mL    OUT:  Total OUT: 0 mL    Total NET: 160 mL               Discussed with:     Cultures:	        Radiology

## 2021-09-26 NOTE — PROGRESS NOTE ADULT - ASSESSMENT
75 year old female with a history of HTN, DM, CVA, dementia, chronic respiratory failure s/p trach, PEG, recent cardiac arrest who presents with acute on chronic respiratory failure. The patient is unable to provide any history to me due to advanced dementia and trach. She was found to have respiratory distress at NH so she was sent to ED. Hypoxic in NH was titrated up to 100%. Pt had been agitated in ER was given 0.5 ativan in ER. Pt agitation resolved   Pt trach to vent at this time with new seemingly aspiration/multifacial PNA. Pt in Er was normotensive then recently began to drop BP, additional fluid bolus was given and will start on levophed to maintain MAP>65       ACUTE RENAL FAILURE: lactated ringers. 1000 milliLiter(s) (80 mL/Hr) IV   Serum creatinine is 1.3  There is no progression . No uremic symptoms  No evidence of anemia .  Fluid status stable.  Will continue to avoid nephrotoxic drugs.  Patient remains asymptomatic.   Continue current therapy.  hold  diuretic.  hold   ACE inhibitor.  hold   ARB.  Additional evaluation:   ECG,    echocardiogram,     CXR,  will obtained recent   renal ultrasound to evalaute kidney size and possible stones , if cr is not improving     Admit for septic workup and ID evaluation,send blood and urine cx,serial lactate levels,monitor vitals closley,amadeos hydration,monitor urine output and renal profile,iv abx as per id cons  piperacillin/tazobactam IVPB.. 3.375 Gram(s) IV Intermittent every 8 hours

## 2021-09-26 NOTE — PROGRESS NOTE ADULT - PROBLEM SELECTOR PLAN 1
recurrent, versus vent associated PNA, start on aspiration precautions, patient was started on Zosyn & Vancomycin, continue, trend TLC and monitor temp, f/u culture results, ID consult with Dr. Vleez was called.    ID recs pending.

## 2021-09-26 NOTE — PROGRESS NOTE ADULT - SUBJECTIVE AND OBJECTIVE BOX
Patient is a 75y old  Female who presents with a chief complaint of Respiratory distress. (26 Sep 2021 16:44)      BRIEF HOSPITAL COURSE:   Pt is a 74 y/o female with PMHx of HTN, DM, prior CVA, dementia, chronic hypoxic respiratory failure s/p trach/peg and recent cardiac arrest admitted following respiratory distress and hypoxia while @ NH. In ED, placed on 100% fio2, agitated, given ativan, also w/ low BPs responsive to fluids. Labs revealing mild RYAN, leukocytosis. CT w/ dense b/l infiltrates and atelectasis. U/A+. Given dose of vanco, started on Zosyn.     Events last 24 hours:  On 50%/+5 on vent, elevated plats mid 30s. BP stable. Unresponsive but at times opens eyes. Not purposefully. Afebrile.     PAST MEDICAL & SURGICAL HISTORY:  Dementia of frontal lobe type    Aphasic stroke    Diabetes mellitus    Respiratory failure    Hypertension    GERD (gastroesophageal reflux disease)    Constipation    Respiratory failure    CVA (cerebral vascular accident)    HTN (hypertension)    DM (diabetes mellitus)    Advanced dementia    COVID-19 virus detected    Quadriplegia    Pneumonia    Type II diabetes mellitus    Hx of appendectomy    Gastrostomy in place    Tracheostomy in place    Tracheostomy tube present    Feeding by G-tube        Review of Systems:  Unable to perform, AMS    Medications:  piperacillin/tazobactam IVPB.. 3.375 Gram(s) IV Intermittent every 8 hours      ALBUTerol    90 MICROgram(s) HFA Inhaler 1 Puff(s) Inhalation every 6 hours PRN  ipratropium 17 MICROgram(s) HFA Inhaler 1 Puff(s) Inhalation every 6 hours    fentaNYL   Patch  12 MICROgram(s)/Hr 1 Patch Transdermal every 72 hours  LORazepam   Injectable 0.5 milliGRAM(s) IV Push every 2 hours PRN  LORazepam   Injectable 1 milliGRAM(s) IV Push every 6 hours PRN  methocarbamol 250 milliGRAM(s) Oral two times a day      aspirin  chewable 81 milliGRAM(s) Oral daily  heparin   Injectable 5000 Unit(s) SubCutaneous every 8 hours    bisacodyl 5 milliGRAM(s) Oral at bedtime  pantoprazole  Injectable 40 milliGRAM(s) IV Push daily  polyethylene glycol 3350 17 Gram(s) Oral every 12 hours PRN  senna 1 Tablet(s) Oral daily  simethicone 80 milliGRAM(s) Chew two times a day PRN  sucralfate suspension 1 Gram(s) Enteral Tube four times a day      dextrose 40% Gel 15 Gram(s) Oral once  dextrose 50% Injectable 25 Gram(s) IV Push once  dextrose 50% Injectable 12.5 Gram(s) IV Push once  dextrose 50% Injectable 25 Gram(s) IV Push once  glucagon  Injectable 1 milliGRAM(s) IntraMuscular once  insulin glargine Injectable (LANTUS) 36 Unit(s) SubCutaneous at bedtime  insulin lispro (ADMELOG) corrective regimen sliding scale   SubCutaneous every 6 hours    dextrose 5%. 1000 milliLiter(s) IV Continuous <Continuous>  dextrose 5%. 1000 milliLiter(s) IV Continuous <Continuous>  lactated ringers. 1000 milliLiter(s) IV Continuous <Continuous>      chlorhexidine 0.12% Liquid 15 milliLiter(s) Oral Mucosa every 12 hours    lactobacillus acidophilus 1 Tablet(s) Oral daily      Mode: AC/ CMV (Assist Control/ Continuous Mandatory Ventilation)  RR (machine): 18  TV (machine): 400  FiO2: 50  PEEP: 5  PS: 5  ITime: 1  MAP: 12  PIP: 28      ICU Vital Signs Last 24 Hrs  T(C): 36.7 (26 Sep 2021 15:59), Max: 37.1 (25 Sep 2021 20:57)  T(F): 98 (26 Sep 2021 15:59), Max: 98.7 (25 Sep 2021 20:57)  HR: 84 (26 Sep 2021 18:05) (69 - 104)  BP: 113/62 (26 Sep 2021 18:00) (87/39 - 142/58)  BP(mean): 77 (26 Sep 2021 18:00) (60 - 94)  ABP: --  ABP(mean): --  RR: 15 (26 Sep 2021 18:00) (13 - 33)  SpO2: 99% (26 Sep 2021 18:05) (60% - 100%)          I&O's Detail    25 Sep 2021 07:01  -  26 Sep 2021 07:00  --------------------------------------------------------  IN:    IV PiggyBack: 200 mL    Lactated Ringers: 880 mL  Total IN: 1080 mL    OUT:    Indwelling Catheter - Urethral (mL): 750 mL    Norepinephrine: 0 mL  Total OUT: 750 mL    Total NET: 330 mL      26 Sep 2021 07:01  -  26 Sep 2021 19:34  --------------------------------------------------------  IN:    Enteral Tube Flush: 50 mL    Lactated Ringers: 960 mL  Total IN: 1010 mL    OUT:  Total OUT: 0 mL    Total NET: 1010 mL          LABS:                        8.6    x     )-----------( x        ( 26 Sep 2021 12:00 )             28.5         142  |  112<H>  |  32<H>  ----------------------------<  125<H>  4.7   |  21<L>  |  1.00    Ca    8.5      26 Sep 2021 06:41  Phos  3.0       Mg     2.9         TPro  7.6  /  Alb  2.1<L>  /  TBili  0.4  /  DBili  x   /  AST  34  /  ALT  30  /  AlkPhos  138<H>            CAPILLARY BLOOD GLUCOSE      POCT Blood Glucose.: 154 mg/dL (26 Sep 2021 18:01)    PT/INR - ( 26 Sep 2021 06:41 )   PT: 13.5 sec;   INR: 1.12 ratio         PTT - ( 25 Sep 2021 15:02 )  PTT:45.0 sec  Urinalysis Basic - ( 25 Sep 2021 15:02 )    Color: Yellow / Appearance: Turbid / S.010 / pH: x  Gluc: x / Ketone: Trace  / Bili: Negative / Urobili: Negative mg/dL   Blood: x / Protein: 100 mg/dL / Nitrite: Negative   Leuk Esterase: Moderate / RBC: 6-10 /HPF / WBC 26-50   Sq Epi: x / Non Sq Epi: Few / Bacteria: Many      CULTURES:  Rapid RVP Result: NotDetec ( @ 15:06)    Physical Examination:    General: Opens eyes but unresponsive to all stimuli    HEENT: Pupils equal, reactive to light.  Symmetric. No scleral icterus or injection    PULM: scattered rhonchi b/l, diminished bibasilar, L>R, synchronous to vent    NECK: Supple, no lymphadenopathy, trachea midline    CVS: Regular rate and rhythm, no murmurs, +s1/s2    ABD: Soft, nondistended, normoactive bowel sounds, PEG site CDI    EXT: LE edema    SKIN: Warm and well perfused    NEURO: Eyes are open but nothing purposefully, unresponsive, not following commands, non focal w/d to pain     RADIOLOGY: < from: CT Chest No Cont (21 @ 19:35) >  IMPRESSION: Motion limited examination. Examination also limited without intravenous contrast.    Dense bilateral lung consolidations. New since the previous exam. Mediastinal lymphadenopathy. Small bilateral effusions and bibasilar atelectasis. Findings may represent infection, correlate clinically and follow to resolution.    Dilated fluid-filled rectum without rectal wall thickening unchanged from previous exam. Remainder of the colon is mildly dilated with some fluid. Unchanged.    < end of copied text >    CRITICAL CARE TIME SPENT:  35 mins assessing presenting problems of acute illness that poses high probability of life threatening deterioration or end organ damage/dysfunction.  Medical decision making including initiating plan of care, reviewing data, reviewing radiology, direct patient bedside evaluation and interpretation of vital signs, any necessary ventilator management, discussion with multidisciplinary team, discussing goals of care with patient/family, all non inclusive of procedures    Patient is a 75y old  Female who presents with a chief complaint of Respiratory distress. (26 Sep 2021 16:44)      BRIEF HOSPITAL COURSE:   Pt is a 76 y/o female with PMHx of HTN, DM, prior CVA, dementia, chronic hypoxic respiratory failure s/p trach/peg and recent cardiac arrest admitted following respiratory distress and hypoxia while @ NH. In ED, placed on 100% fio2, agitated, given ativan, also w/ low BPs responsive to fluids. Labs revealing mild RYAN, leukocytosis. CT w/ dense b/l infiltrates and atelectasis. U/A+. Given dose of vanco, started on Zosyn.     Events last 24 hours:  On 50%/+5 on vent, elevated plats mid 30s, saturating low 80s, tachypneic, agitated. Given 1mg IV ativan w/ improvement. BP stable. Unresponsive but at times opens eyes. Not purposefully. Afebrile.     PAST MEDICAL & SURGICAL HISTORY:  Dementia of frontal lobe type    Aphasic stroke    Diabetes mellitus    Respiratory failure    Hypertension    GERD (gastroesophageal reflux disease)    Constipation    Respiratory failure    CVA (cerebral vascular accident)    HTN (hypertension)    DM (diabetes mellitus)    Advanced dementia    COVID-19 virus detected    Quadriplegia    Pneumonia    Type II diabetes mellitus    Hx of appendectomy    Gastrostomy in place    Tracheostomy in place    Tracheostomy tube present    Feeding by G-tube        Review of Systems:  Unable to perform, AMS    Medications:  piperacillin/tazobactam IVPB.. 3.375 Gram(s) IV Intermittent every 8 hours      ALBUTerol    90 MICROgram(s) HFA Inhaler 1 Puff(s) Inhalation every 6 hours PRN  ipratropium 17 MICROgram(s) HFA Inhaler 1 Puff(s) Inhalation every 6 hours    fentaNYL   Patch  12 MICROgram(s)/Hr 1 Patch Transdermal every 72 hours  LORazepam   Injectable 0.5 milliGRAM(s) IV Push every 2 hours PRN  LORazepam   Injectable 1 milliGRAM(s) IV Push every 6 hours PRN  methocarbamol 250 milliGRAM(s) Oral two times a day      aspirin  chewable 81 milliGRAM(s) Oral daily  heparin   Injectable 5000 Unit(s) SubCutaneous every 8 hours    bisacodyl 5 milliGRAM(s) Oral at bedtime  pantoprazole  Injectable 40 milliGRAM(s) IV Push daily  polyethylene glycol 3350 17 Gram(s) Oral every 12 hours PRN  senna 1 Tablet(s) Oral daily  simethicone 80 milliGRAM(s) Chew two times a day PRN  sucralfate suspension 1 Gram(s) Enteral Tube four times a day      dextrose 40% Gel 15 Gram(s) Oral once  dextrose 50% Injectable 25 Gram(s) IV Push once  dextrose 50% Injectable 12.5 Gram(s) IV Push once  dextrose 50% Injectable 25 Gram(s) IV Push once  glucagon  Injectable 1 milliGRAM(s) IntraMuscular once  insulin glargine Injectable (LANTUS) 36 Unit(s) SubCutaneous at bedtime  insulin lispro (ADMELOG) corrective regimen sliding scale   SubCutaneous every 6 hours    dextrose 5%. 1000 milliLiter(s) IV Continuous <Continuous>  dextrose 5%. 1000 milliLiter(s) IV Continuous <Continuous>  lactated ringers. 1000 milliLiter(s) IV Continuous <Continuous>      chlorhexidine 0.12% Liquid 15 milliLiter(s) Oral Mucosa every 12 hours    lactobacillus acidophilus 1 Tablet(s) Oral daily      Mode: AC/ CMV (Assist Control/ Continuous Mandatory Ventilation)  RR (machine): 18  TV (machine): 400  FiO2: 50  PEEP: 5  PS: 5  ITime: 1  MAP: 12  PIP: 28      ICU Vital Signs Last 24 Hrs  T(C): 36.7 (26 Sep 2021 15:59), Max: 37.1 (25 Sep 2021 20:57)  T(F): 98 (26 Sep 2021 15:59), Max: 98.7 (25 Sep 2021 20:57)  HR: 84 (26 Sep 2021 18:05) (69 - 104)  BP: 113/62 (26 Sep 2021 18:00) (87/39 - 142/58)  BP(mean): 77 (26 Sep 2021 18:00) (60 - 94)  ABP: --  ABP(mean): --  RR: 15 (26 Sep 2021 18:00) (13 - 33)  SpO2: 99% (26 Sep 2021 18:05) (60% - 100%)          I&O's Detail    25 Sep 2021 07:01  -  26 Sep 2021 07:00  --------------------------------------------------------  IN:    IV PiggyBack: 200 mL    Lactated Ringers: 880 mL  Total IN: 1080 mL    OUT:    Indwelling Catheter - Urethral (mL): 750 mL    Norepinephrine: 0 mL  Total OUT: 750 mL    Total NET: 330 mL      26 Sep 2021 07:01  -  26 Sep 2021 19:34  --------------------------------------------------------  IN:    Enteral Tube Flush: 50 mL    Lactated Ringers: 960 mL  Total IN: 1010 mL    OUT:  Total OUT: 0 mL    Total NET: 1010 mL          LABS:                        8.6    x     )-----------( x        ( 26 Sep 2021 12:00 )             28.5         142  |  112<H>  |  32<H>  ----------------------------<  125<H>  4.7   |  21<L>  |  1.00    Ca    8.5      26 Sep 2021 06:41  Phos  3.0       Mg     2.9         TPro  7.6  /  Alb  2.1<L>  /  TBili  0.4  /  DBili  x   /  AST  34  /  ALT  30  /  AlkPhos  138<H>            CAPILLARY BLOOD GLUCOSE      POCT Blood Glucose.: 154 mg/dL (26 Sep 2021 18:01)    PT/INR - ( 26 Sep 2021 06:41 )   PT: 13.5 sec;   INR: 1.12 ratio         PTT - ( 25 Sep 2021 15:02 )  PTT:45.0 sec  Urinalysis Basic - ( 25 Sep 2021 15:02 )    Color: Yellow / Appearance: Turbid / S.010 / pH: x  Gluc: x / Ketone: Trace  / Bili: Negative / Urobili: Negative mg/dL   Blood: x / Protein: 100 mg/dL / Nitrite: Negative   Leuk Esterase: Moderate / RBC: 6-10 /HPF / WBC 26-50   Sq Epi: x / Non Sq Epi: Few / Bacteria: Many      CULTURES:  Rapid RVP Result: NotDetec (09-25 @ 15:06)    Physical Examination:    General: Opens eyes but unresponsive to all stimuli    HEENT: Pupils equal, reactive to light.  Symmetric. No scleral icterus or injection    PULM: scattered rhonchi b/l, diminished bibasilar, L>R, synchronous to vent    NECK: Supple, no lymphadenopathy, trachea midline    CVS: Regular rate and rhythm, no murmurs, +s1/s2    ABD: Soft, nondistended, normoactive bowel sounds, PEG site CDI    EXT: LE edema    SKIN: Warm and well perfused    NEURO: Eyes are open but nothing purposefully, unresponsive, not following commands, non focal w/d to pain     RADIOLOGY: < from: CT Chest No Cont (21 @ 19:35) >  IMPRESSION: Motion limited examination. Examination also limited without intravenous contrast.    Dense bilateral lung consolidations. New since the previous exam. Mediastinal lymphadenopathy. Small bilateral effusions and bibasilar atelectasis. Findings may represent infection, correlate clinically and follow to resolution.    Dilated fluid-filled rectum without rectal wall thickening unchanged from previous exam. Remainder of the colon is mildly dilated with some fluid. Unchanged.    < end of copied text >    CRITICAL CARE TIME SPENT:  35 mins assessing presenting problems of acute illness that poses high probability of life threatening deterioration or end organ damage/dysfunction.  Medical decision making including initiating plan of care, reviewing data, reviewing radiology, direct patient bedside evaluation and interpretation of vital signs, any necessary ventilator management, discussion with multidisciplinary team, discussing goals of care with patient/family, all non inclusive of procedures

## 2021-09-26 NOTE — PROGRESS NOTE ADULT - ASSESSMENT
74 y/o female with a PMHx of DM2, pna, quadriplegia, advanced dementia, DM, CVA, GERD, HTN presents to the ED c/o recent admitted to [place] [facility] 9/7-9/15 after being found unresponsive at nursing home, concerned about post operative distress, hypotensive signs of aspirational pna, with volume dissipated, treated with levosa, zosyn. Admitted to ICU for aspirational pna. Urinary culture proteus, which was also sensitive to Zosyn. Also appears to have paronychia of toe which she had Zyvox during hospital stay. She was found to be anemic so she was transfused of 1 L RBC, no signs of active bleeding and pt stabilized. Pt was stabilized and d/c back to rehab. Patient reported for reported respiratory distress. Pt found to be on normal vent settings, does have increased  respiratory rate. No further hx can be obtained at this time as pt is nonverbal.

## 2021-09-26 NOTE — PROCEDURE NOTE - ADDITIONAL PROCEDURE DETAILS
Procedure performed independent of critical care time    Dx; Acute on chronic hypoxic respiratory failure, multifocal pneumonia, severe sepsis, lactic acidosis, encephalopathy

## 2021-09-26 NOTE — PROGRESS NOTE ADULT - SUBJECTIVE AND OBJECTIVE BOX
Chief Complaint: Respiratory distress    Interval Events: No events overnight.    Review of Systems:  Unable to obtain    Physical Exam:  Vitals:        Vital Signs Last 24 Hrs  T(C): 36.9 (26 Sep 2021 06:01), Max: 38.1 (25 Sep 2021 14:29)  T(F): 98.4 (26 Sep 2021 06:01), Max: 100.6 (25 Sep 2021 14:29)  HR: 81 (26 Sep 2021 09:00) (69 - 116)  BP: 100/60 (26 Sep 2021 09:00) (87/39 - 164/71)  BP(mean): 72 (26 Sep 2021 09:00) (60 - 94)  RR: 14 (26 Sep 2021 09:00) (13 - 34)  SpO2: 100% (26 Sep 2021 09:00) (60% - 100%)  General: Chronically ill appearing  HEENT: Trach  Neck: No JVD, no carotid bruit  Lungs: Coarse bilaterally  CV: RRR, nl S1/S2, no M/R/G  Abdomen: S/NT/ND, +BS  Extremities: No LE edema, no cyanosis  Neuro: AAOx0  Skin: No rash    Labs:                        9.1    9.62  )-----------( 424      ( 26 Sep 2021 06:41 )             31.2     09-25    137  |  100  |  52<H>  ----------------------------<  297<H>  5.3   |  28  |  1.31<H>    Ca    9.7      25 Sep 2021 15:02  Phos  4.5     09-25  Mg     4.3     09-25    TPro  8.6<H>  /  Alb  2.5<L>  /  TBili  0.5  /  DBili  x   /  AST  41<H>  /  ALT  33  /  AlkPhos  170<H>  09-25        PT/INR - ( 26 Sep 2021 06:41 )   PT: 13.5 sec;   INR: 1.12 ratio         PTT - ( 25 Sep 2021 15:02 )  PTT:45.0 sec    Telemetry: Sinus rhythm

## 2021-09-26 NOTE — PROGRESS NOTE ADULT - ASSESSMENT
Pt is a 74 y/o female with PMHx as above here with:    Assessment:  1.  Pt is a 76 y/o female with PMHx as above here with:    Assessment:  1. Acute on chronic hypoxic respiratory failure  2. Severe sepsis  3. Multifocal Pna- concern for VAP/HAP  4. UTI  5. Lactic acidosis  6. Encephalopathy    Plan:  - On VAC 50/+5, peep increased to +8 w/ improvement in spo2s to high 90s from low 80s. Weaned fio2 to 40%. Maintained ~96-98%. Titrating to maintain Spo2 >90%. Vent bundle reviewed.  - ABG drawn w/ U/S guidance, 743/39/76. Maintain current settings  - PRN Ativan for agitation, consider precedex if agitation considers to cause vent desynchrony and desaturations  - Maintain MAP 65-70  - On Feeds, protonix, Bowel regimen  - Cr improving, monitor renal indices, primafit in place, inadequate UOP, increase LR to 125cc/hr  - Strict I/OIs, replete lytes as needed  - ID following, on Zosyn empirically, recently hospitalized on chronic vent, add MRSA swab now, legionella neg, scx ngtd, BCx and UCx pending, f/u  - Trend lactate, repeat in AM  - Hep subq for dvt ppx    Dispo: Full code. Critically ill.

## 2021-09-26 NOTE — CONSULT NOTE ADULT - SUBJECTIVE AND OBJECTIVE BOX
[facility], Division of Infectious Diseases  MOIZ Lora, SOLANGE Higgins  [phone]  ([phone] - weekdays after 5pm and weekends)    BREA BECKHAM  75y, Female  [id number]    HPI:  Patient is a 75 year old female with PMH of HTN, DM type 2, CVA, recent Cardiac Arrest, Chronic Respiratory Failure, Vent Dependant s/p trach & PEG, Anemia with GI blood loss, and Dementia who was sent from [facility] facility for acute respiratory distress. Patient was admitted to [facility] about 3 weeks ago s/p cardiac arrest with B/L aspiration PNA, Sepsis, Proteus Mirabilis UTI, and infected foot ulcer, and discharged 10 days ago. At the ED she was found with thick ous at her airway that responded well to suction, was found with new CT findings suggestive of aspiration PNA, also with UTI, and lactic acid of 5.8 mmol/L. Patient is awake & alert but non verbal, no further history could be obtained at this time.  (25 Sep 2021 22:13)  ROS: unable to obtain    Allergies: codeine (Hives)    PMH -- Dementia of frontal lobe type  Aphasic stroke  Diabetes mellitus  Respiratory failure  Hypertension  GERD (gastroesophageal reflux disease)  Constipation  Respiratory failure  CVA (cerebral vascular accident)  HTN (hypertension)  DM (diabetes mellitus)  Advanced dementia  COVID-19 virus detected  Quadriplegia  Pneumonia  Type II diabetes mellitus    PSH -- Hx of appendectomy  Gastrostomy in place  Tracheostomy in place  Tracheostomy tube present  Feeding by G-tube    FH -- No pertinent family history in first degree relatives  Social History -- no known tobacco, alcohol or illicit drug use    Physical Exam--  Vital Signs Last 24 Hrs  T(F): 97.7 (26 Sep 2021 12:30), Max: 100.6 (25 Sep 2021 15:10)  HR: 80 (26 Sep 2021 14:00) (69 - 114)  BP: 105/56 (26 Sep 2021 14:00) (87/39 - 164/71)  RR: 18 (26 Sep 2021 14:00) (13 - 34)  SpO2: 100% (26 Sep 2021 14:00) (60% - 100%)  General: no acute distress  HEENT: NC/AT, anicteric, neck supple, trach to vent FiO2 50%  Lungs: coarse breath sounds bilaterally   Heart: S1 and S2 present, normal rate, no murmur heard  Abdomen: Soft. ND, no grimacing on palpation. BS+, +PEG  Neuro: awake, not answering/following  Extremities: No cyanosis. No edema.   Skin: Warm. Dry. No visible rash.  Lines: PIV    Laboratory & Imaging Data--  CBC:                       8.6    x     )-----------( x        ( 26 Sep 2021 12:00 )             28.5     WBC Count: 9.62 K/uL (21 @ 06:41)  WBC Count: 13.61 K/uL (21 @ 15:01)    CMP:     142  |  112<H>  |  32<H>  ----------------------------<  125<H>  4.7   |  21<L>  |  1.00    Ca    8.5      26 Sep 2021 06:41  Phos  3.0       Mg     2.9         TPro  7.6  /  Alb  2.1<L>  /  TBili  0.4  /  DBili  x   /  AST  34  /  ALT  30  /  AlkPhos  138<H>      LIVER FUNCTIONS - ( 26 Sep 2021 06:41 )  Alb: 2.1 g/dL / Pro: 7.6 g/dL / ALK PHOS: 138 U/L / ALT: 30 U/L / AST: 34 U/L / GGT: x           Urinalysis Basic - ( 25 Sep 2021 15:02 )  Color: Yellow / Appearance: Turbid / S.010 / pH: x  Gluc: x / Ketone: Trace  / Bili: Negative / Urobili: Negative mg/dL   Blood: x / Protein: 100 mg/dL / Nitrite: Negative   Leuk Esterase: Moderate / RBC: 6-10 /HPF / WBC 26-50   Sq Epi: x / Non Sq Epi: Few / Bacteria: Many    Microbiology:    - COVID-19 spike domain ab - positive    - Legionella urine ag - negative   - RVP/COVID - negative     Radiology--  CT Abdomen and Pelvis No Cont (21 @ 19:36) >IMPRESSION: Motion limited examination. Examination also limited without intravenous contrast.  Dense bilateral lung consolidations. New since the previous exam. Mediastinal lymphadenopathy. Small bilateral effusions and bibasilar atelectasis. Findings may represent infection, correlate clinically and follow to resolution.  Dilated fluid-filled rectum without rectal wall thickening unchanged from previous exam. Remainder of the colon is mildly dilated with some fluid. Unchanged.    Xray Chest 1 View-PORTABLE IMMEDIATE (21 @ 15:55) >IMPRESSION: Bilateral hazy airspace opacities, worse in the left lung, concerning for viral pneumonia such as COVID 19. This is new compared to the prior chest radiograph. Small left pleural effusion.    Active Medications--  ALBUTerol    90 MICROgram(s) HFA Inhaler 1 Puff(s) Inhalation every 6 hours PRN  aspirin  chewable 81 milliGRAM(s) Oral daily  bisacodyl 5 milliGRAM(s) Oral at bedtime  chlorhexidine 0.12% Liquid 15 milliLiter(s) Oral Mucosa every 12 hours  dextrose 40% Gel 15 Gram(s) Oral once  dextrose 5%. 1000 milliLiter(s) IV Continuous <Continuous>  dextrose 5%. 1000 milliLiter(s) IV Continuous <Continuous>  dextrose 50% Injectable 25 Gram(s) IV Push once  dextrose 50% Injectable 12.5 Gram(s) IV Push once  dextrose 50% Injectable 25 Gram(s) IV Push once  fentaNYL   Patch  12 MICROgram(s)/Hr 1 Patch Transdermal every 72 hours  glucagon  Injectable 1 milliGRAM(s) IntraMuscular once  heparin   Injectable 5000 Unit(s) SubCutaneous every 8 hours  insulin glargine Injectable (LANTUS) 36 Unit(s) SubCutaneous at bedtime  insulin lispro (ADMELOG) corrective regimen sliding scale   SubCutaneous every 6 hours  ipratropium 17 MICROgram(s) HFA Inhaler 1 Puff(s) Inhalation every 6 hours  lactated ringers. 1000 milliLiter(s) IV Continuous <Continuous>  lactobacillus acidophilus 1 Tablet(s) Oral daily  LORazepam   Injectable 0.5 milliGRAM(s) IV Push every 2 hours PRN  LORazepam   Injectable 1 milliGRAM(s) IV Push every 6 hours PRN  methocarbamol 250 milliGRAM(s) Oral two times a day  pantoprazole  Injectable 40 milliGRAM(s) IV Push daily  piperacillin/tazobactam IVPB.. 3.375 Gram(s) IV Intermittent every 8 hours  polyethylene glycol 3350 17 Gram(s) Oral every 12 hours PRN  senna 1 Tablet(s) Oral daily  simethicone 80 milliGRAM(s) Chew two times a day PRN  sucralfate suspension 1 Gram(s) Enteral Tube four times a day    Antimicrobials:   piperacillin/tazobactam IVPB.. 3.375 Gram(s) IV Intermittent every 8 hours    Immunologic:

## 2021-09-26 NOTE — DIETITIAN INITIAL EVALUATION ADULT. - OTHER INFO
Per H&P, Pt is a 76 y/o F with PMHx of HTN, DM type 2, CVA, recent Cardiac Arrest, Chronic Respiratory Failure, Vent Dependant s/p trach & PEG, Anemia with GI blood loss, and Dementia who was sent from [facility] facility for acute respiratory distress. Patient was admitted to [facility] about 3 weeks ago s/p cardiac arrest with B/L aspiration PNA, Sepsis, Proteus Mirabilis UTI, and infected foot ulcer, and discharged 10 days ago. At the ED she was found with thick ous at her airway that responded well to suction, was found with new CT findings suggestive of aspiration PNA, also with UTI, and lactic acid of 5.8 mmol/L. Patient is awake & alert but non verbal, no further history could be obtained at this time.     Nutrition consult ordered for PI stage II or >, enteral nutrition PTA. Visited pt this morning, unable to obtain subjective hx at this time, pt is non verbal. Pt came from [facility] facility where pt was receiving EN feeds via PEG of Glucerna 1.5 @ 50 ml/hr x 20hr for a total volume of 1000 ml/day, providing pt w/ 1500kcal and 82.5g protein per day (+addtl 200ml before and after, +200ml at 2am= 600ml fluid/day in addition to tube feeding hourly flush). No food allergies per chart review. Per previous RD note from previous admission on 9/8/21, "bed scale weight of 114.8#; adm weight from [facility] June 2021: 116#; transfer weight 120#; no significant wt changes recently per spouse". CBW on admission 134#, unsure of accuracy of wt. No edema noted. + BM 9/26. Skin: R knee skin tear, wounds; L/R 1st toe, pressure injuries; R 4th toe DTI, R 5th toe DTI, L medial malleolus stage I. Pt currently NPO, receiving IVF 80 ml/hr. When medically appropriate, recommend EN feeds via PEG of Glucerna 1.5 to goal 50 ml/hr x 20 hrs, to provide 1,500 kcal and 82.5 g protein. Recommend MVI and vitamin C supplementation to facilitate wound healing. RD to follow-up and will continue to monitor pt's nutrition status.

## 2021-09-27 LAB
ALBUMIN SERPL ELPH-MCNC: 2 G/DL — LOW (ref 3.3–5)
ALP SERPL-CCNC: 117 U/L — SIGNIFICANT CHANGE UP (ref 40–120)
ALT FLD-CCNC: 27 U/L — SIGNIFICANT CHANGE UP (ref 12–78)
ANION GAP SERPL CALC-SCNC: 6 MMOL/L — SIGNIFICANT CHANGE UP (ref 5–17)
AST SERPL-CCNC: 21 U/L — SIGNIFICANT CHANGE UP (ref 15–37)
BILIRUB SERPL-MCNC: 0.4 MG/DL — SIGNIFICANT CHANGE UP (ref 0.2–1.2)
BUN SERPL-MCNC: 23 MG/DL — SIGNIFICANT CHANGE UP (ref 7–23)
CALCIUM SERPL-MCNC: 8.6 MG/DL — SIGNIFICANT CHANGE UP (ref 8.5–10.1)
CHLORIDE SERPL-SCNC: 111 MMOL/L — HIGH (ref 96–108)
CO2 SERPL-SCNC: 23 MMOL/L — SIGNIFICANT CHANGE UP (ref 22–31)
CREAT SERPL-MCNC: 0.93 MG/DL — SIGNIFICANT CHANGE UP (ref 0.5–1.3)
GLUCOSE SERPL-MCNC: 187 MG/DL — HIGH (ref 70–99)
HCT VFR BLD CALC: 25.4 % — LOW (ref 34.5–45)
HCT VFR BLD CALC: 28.3 % — LOW (ref 34.5–45)
HGB BLD-MCNC: 7.7 G/DL — LOW (ref 11.5–15.5)
HGB BLD-MCNC: 8.4 G/DL — LOW (ref 11.5–15.5)
INR BLD: 1.12 RATIO — SIGNIFICANT CHANGE UP (ref 0.88–1.16)
LACTATE SERPL-SCNC: 0.9 MMOL/L — SIGNIFICANT CHANGE UP (ref 0.7–2)
MAGNESIUM SERPL-MCNC: 2.6 MG/DL — SIGNIFICANT CHANGE UP (ref 1.6–2.6)
MCHC RBC-ENTMCNC: 27.2 PG — SIGNIFICANT CHANGE UP (ref 27–34)
MCHC RBC-ENTMCNC: 27.7 PG — SIGNIFICANT CHANGE UP (ref 27–34)
MCHC RBC-ENTMCNC: 29.7 GM/DL — LOW (ref 32–36)
MCHC RBC-ENTMCNC: 30.3 GM/DL — LOW (ref 32–36)
MCV RBC AUTO: 89.8 FL — SIGNIFICANT CHANGE UP (ref 80–100)
MCV RBC AUTO: 93.4 FL — SIGNIFICANT CHANGE UP (ref 80–100)
MRSA PCR RESULT.: SIGNIFICANT CHANGE UP
NRBC # BLD: 0 /100 WBCS — SIGNIFICANT CHANGE UP (ref 0–0)
NRBC # BLD: 0 /100 WBCS — SIGNIFICANT CHANGE UP (ref 0–0)
PHOSPHATE SERPL-MCNC: 2.6 MG/DL — SIGNIFICANT CHANGE UP (ref 2.5–4.5)
PLATELET # BLD AUTO: 286 K/UL — SIGNIFICANT CHANGE UP (ref 150–400)
PLATELET # BLD AUTO: 342 K/UL — SIGNIFICANT CHANGE UP (ref 150–400)
POTASSIUM SERPL-MCNC: 4.5 MMOL/L — SIGNIFICANT CHANGE UP (ref 3.5–5.3)
POTASSIUM SERPL-SCNC: 4.5 MMOL/L — SIGNIFICANT CHANGE UP (ref 3.5–5.3)
PROT SERPL-MCNC: 7.2 G/DL — SIGNIFICANT CHANGE UP (ref 6–8.3)
PROTHROM AB SERPL-ACNC: 13.5 SEC — SIGNIFICANT CHANGE UP (ref 10.6–13.6)
RBC # BLD: 2.83 M/UL — LOW (ref 3.8–5.2)
RBC # BLD: 3.03 M/UL — LOW (ref 3.8–5.2)
RBC # FLD: 18.1 % — HIGH (ref 10.3–14.5)
RBC # FLD: 18.3 % — HIGH (ref 10.3–14.5)
S AUREUS DNA NOSE QL NAA+PROBE: SIGNIFICANT CHANGE UP
SODIUM SERPL-SCNC: 140 MMOL/L — SIGNIFICANT CHANGE UP (ref 135–145)
WBC # BLD: 6.92 K/UL — SIGNIFICANT CHANGE UP (ref 3.8–10.5)
WBC # BLD: 7.82 K/UL — SIGNIFICANT CHANGE UP (ref 3.8–10.5)
WBC # FLD AUTO: 6.92 K/UL — SIGNIFICANT CHANGE UP (ref 3.8–10.5)
WBC # FLD AUTO: 7.82 K/UL — SIGNIFICANT CHANGE UP (ref 3.8–10.5)

## 2021-09-27 PROCEDURE — 71045 X-RAY EXAM CHEST 1 VIEW: CPT | Mod: 26

## 2021-09-27 PROCEDURE — 99233 SBSQ HOSP IP/OBS HIGH 50: CPT

## 2021-09-27 RX ORDER — FENTANYL CITRATE 50 UG/ML
50 INJECTION INTRAVENOUS ONCE
Refills: 0 | Status: DISCONTINUED | OUTPATIENT
Start: 2021-09-27 | End: 2021-09-27

## 2021-09-27 RX ADMIN — Medication 1 TABLET(S): at 11:36

## 2021-09-27 RX ADMIN — CHLORHEXIDINE GLUCONATE 15 MILLILITER(S): 213 SOLUTION TOPICAL at 05:21

## 2021-09-27 RX ADMIN — METHOCARBAMOL 250 MILLIGRAM(S): 500 TABLET, FILM COATED ORAL at 05:20

## 2021-09-27 RX ADMIN — ALBUTEROL 1 PUFF(S): 90 AEROSOL, METERED ORAL at 13:29

## 2021-09-27 RX ADMIN — SENNA PLUS 1 TABLET(S): 8.6 TABLET ORAL at 11:36

## 2021-09-27 RX ADMIN — Medication 1 GRAM(S): at 05:21

## 2021-09-27 RX ADMIN — Medication 1 MILLIGRAM(S): at 23:18

## 2021-09-27 RX ADMIN — HEPARIN SODIUM 5000 UNIT(S): 5000 INJECTION INTRAVENOUS; SUBCUTANEOUS at 05:20

## 2021-09-27 RX ADMIN — SODIUM CHLORIDE 125 MILLILITER(S): 9 INJECTION, SOLUTION INTRAVENOUS at 11:27

## 2021-09-27 RX ADMIN — HEPARIN SODIUM 5000 UNIT(S): 5000 INJECTION INTRAVENOUS; SUBCUTANEOUS at 13:01

## 2021-09-27 RX ADMIN — Medication 1 GRAM(S): at 23:17

## 2021-09-27 RX ADMIN — Medication 0.5 MILLIGRAM(S): at 01:28

## 2021-09-27 RX ADMIN — Medication 1 GRAM(S): at 17:54

## 2021-09-27 RX ADMIN — PANTOPRAZOLE SODIUM 40 MILLIGRAM(S): 20 TABLET, DELAYED RELEASE ORAL at 11:36

## 2021-09-27 RX ADMIN — Medication 1 PUFF(S): at 13:29

## 2021-09-27 RX ADMIN — Medication 1 GRAM(S): at 11:36

## 2021-09-27 RX ADMIN — Medication 10 MILLIGRAM(S): at 21:58

## 2021-09-27 RX ADMIN — ALBUTEROL 1 PUFF(S): 90 AEROSOL, METERED ORAL at 06:52

## 2021-09-27 RX ADMIN — METHOCARBAMOL 250 MILLIGRAM(S): 500 TABLET, FILM COATED ORAL at 17:54

## 2021-09-27 RX ADMIN — CHLORHEXIDINE GLUCONATE 15 MILLILITER(S): 213 SOLUTION TOPICAL at 17:54

## 2021-09-27 RX ADMIN — PIPERACILLIN AND TAZOBACTAM 25 GRAM(S): 4; .5 INJECTION, POWDER, LYOPHILIZED, FOR SOLUTION INTRAVENOUS at 13:00

## 2021-09-27 RX ADMIN — Medication 1 PUFF(S): at 06:52

## 2021-09-27 RX ADMIN — Medication 1 MILLIGRAM(S): at 09:31

## 2021-09-27 RX ADMIN — PIPERACILLIN AND TAZOBACTAM 25 GRAM(S): 4; .5 INJECTION, POWDER, LYOPHILIZED, FOR SOLUTION INTRAVENOUS at 05:20

## 2021-09-27 RX ADMIN — Medication 1 PUFF(S): at 00:34

## 2021-09-27 RX ADMIN — Medication 0.5 MILLIGRAM(S): at 04:07

## 2021-09-27 RX ADMIN — SODIUM CHLORIDE 125 MILLILITER(S): 9 INJECTION, SOLUTION INTRAVENOUS at 04:08

## 2021-09-27 RX ADMIN — ALBUTEROL 1 PUFF(S): 90 AEROSOL, METERED ORAL at 20:53

## 2021-09-27 RX ADMIN — Medication 1 MILLIGRAM(S): at 22:00

## 2021-09-27 RX ADMIN — Medication 2: at 05:21

## 2021-09-27 RX ADMIN — PIPERACILLIN AND TAZOBACTAM 25 GRAM(S): 4; .5 INJECTION, POWDER, LYOPHILIZED, FOR SOLUTION INTRAVENOUS at 21:58

## 2021-09-27 RX ADMIN — Medication 4: at 12:09

## 2021-09-27 RX ADMIN — INSULIN GLARGINE 36 UNIT(S): 100 INJECTION, SOLUTION SUBCUTANEOUS at 23:04

## 2021-09-27 RX ADMIN — Medication 2 MILLIGRAM(S): at 12:58

## 2021-09-27 RX ADMIN — FENTANYL CITRATE 1 PATCH: 50 INJECTION INTRAVENOUS at 07:44

## 2021-09-27 RX ADMIN — Medication 81 MILLIGRAM(S): at 11:36

## 2021-09-27 RX ADMIN — FENTANYL CITRATE 50 MICROGRAM(S): 50 INJECTION INTRAVENOUS at 10:10

## 2021-09-27 RX ADMIN — HEPARIN SODIUM 5000 UNIT(S): 5000 INJECTION INTRAVENOUS; SUBCUTANEOUS at 21:58

## 2021-09-27 RX ADMIN — Medication 1 MILLIGRAM(S): at 17:54

## 2021-09-27 RX ADMIN — FENTANYL CITRATE 1 PATCH: 50 INJECTION INTRAVENOUS at 19:30

## 2021-09-27 RX ADMIN — Medication 1 PUFF(S): at 20:53

## 2021-09-27 RX ADMIN — Medication 1 MILLIGRAM(S): at 11:36

## 2021-09-27 RX ADMIN — FENTANYL CITRATE 50 MICROGRAM(S): 50 INJECTION INTRAVENOUS at 09:46

## 2021-09-27 NOTE — PROGRESS NOTE ADULT - SUBJECTIVE AND OBJECTIVE BOX
Chief Complaint: Respiratory distress    Interval Events: No events overnight.    Review of Systems:  Unable to obtain    Physical Exam:  Vital Signs Last 24 Hrs  T(C): 37.1 (27 Sep 2021 08:22), Max: 37.1 (27 Sep 2021 08:22)  T(F): 98.7 (27 Sep 2021 08:22), Max: 98.7 (27 Sep 2021 08:22)  HR: 93 (27 Sep 2021 10:29) (80 - 105)  BP: 116/53 (27 Sep 2021 08:00) (98/49 - 158/70)  BP(mean): 72 (27 Sep 2021 08:00) (64 - 97)  RR: 20 (27 Sep 2021 08:00) (14 - 39)  SpO2: 96% (27 Sep 2021 10:29) (88% - 100%)  General: Chronically ill appearing  HEENT: Trach  Neck: No JVD, no carotid bruit  Lungs: Coarse bilaterally  CV: RRR, nl S1/S2, no M/R/G  Abdomen: S/NT/ND, +BS  Extremities: No LE edema, no cyanosis  Neuro: AAOx0  Skin: No rash    Labs:    09-27    140  |  111<H>  |  23  ----------------------------<  187<H>  4.5   |  23  |  0.93    Ca    8.6      27 Sep 2021 05:45  Phos  3.0     09-26  Mg     2.9     09-26    TPro  7.2  /  Alb  2.0<L>  /  TBili  0.4  /  DBili  x   /  AST  21  /  ALT  27  /  AlkPhos  117  09-27                        7.7    6.92  )-----------( 342      ( 27 Sep 2021 05:45 )             25.4       Telemetry: Sinus rhythm

## 2021-09-27 NOTE — PROGRESS NOTE ADULT - SUBJECTIVE AND OBJECTIVE BOX
HPI:     24 hour events:     Review of Systems: Unable to obtain pt trached/pegged    T(F): 97.4 (09-27-21 @ 16:03), Max: 98.8 (09-27-21 @ 12:56)  HR: 90 (09-27-21 @ 21:00) (79 - 109)  BP: 137/58 (09-27-21 @ 21:00) (91/64 - 206/134)  RR: 40 (09-27-21 @ 21:00) (13 - 40)  SpO2: 90% (09-27-21 @ 21:00) (88% - 100%)  Wt(kg): --    Mode: AC/ CMV (Assist Control/ Continuous Mandatory Ventilation), RR (machine): 18, TV (machine): 400, FiO2: 40, PEEP: 8  09-26-21 @ 07:01  -  09-27-21 @ 07:00  --------------------------------------------------------  IN: 3270 mL / OUT: 300 mL / NET: 2970 mL    09-27-21 @ 07:01  -  09-27-21 @ 21:09  --------------------------------------------------------  IN: 855 mL / OUT: 0 mL / NET: 855 mL        CAPILLARY BLOOD GLUCOSE      POCT Blood Glucose.: 148 mg/dL (27 Sep 2021 17:50)      I&O's Summary    26 Sep 2021 07:01  -  27 Sep 2021 07:00  --------------------------------------------------------  IN: 3270 mL / OUT: 300 mL / NET: 2970 mL    27 Sep 2021 07:01  -  27 Sep 2021 21:09  --------------------------------------------------------  IN: 855 mL / OUT: 0 mL / NET: 855 mL        Physical Exam:   Gen:  Neuro:  HEENT:  Resp:  CVS:  Abd:  Ext:  Skin:    Meds:  piperacillin/tazobactam IVPB.. IV Intermittent      dextrose 40% Gel Oral  dextrose 50% Injectable IV Push  dextrose 50% Injectable IV Push  dextrose 50% Injectable IV Push  glucagon  Injectable IntraMuscular  insulin glargine Injectable (LANTUS) SubCutaneous  insulin lispro (ADMELOG) corrective regimen sliding scale SubCutaneous    ALBUTerol    90 MICROgram(s) HFA Inhaler Inhalation PRN  ipratropium 17 MICROgram(s) HFA Inhaler Inhalation    fentaNYL   Patch  12 MICROgram(s)/Hr Transdermal  LORazepam     Tablet Oral  LORazepam   Injectable IV Push PRN  methocarbamol Oral      aspirin  chewable Oral  heparin   Injectable SubCutaneous    bisacodyl Suppository Rectal  pantoprazole  Injectable IV Push  polyethylene glycol 3350 Oral PRN  senna Oral  simethicone Chew PRN  sucralfate suspension Enteral Tube      dextrose 5%. IV Continuous  dextrose 5%. IV Continuous  lactated ringers. IV Continuous      chlorhexidine 0.12% Liquid Oral Mucosa    lactobacillus acidophilus Oral                            8.4    7.82  )-----------( 286      ( 27 Sep 2021 12:08 )             28.3       09-27    140  |  111<H>  |  23  ----------------------------<  187<H>  4.5   |  23  |  0.93    Ca    8.6      27 Sep 2021 05:45  Phos  2.6     09-27  Mg     2.6     09-27    TPro  7.2  /  Alb  2.0<L>  /  TBili  0.4  /  DBili  x   /  AST  21  /  ALT  27  /  AlkPhos  117  09-27    Lactate 0.9           09-27 @ 05:45          PT/INR - ( 27 Sep 2021 05:45 )   PT: 13.5 sec;   INR: 1.12 ratio             .Sputum Sputum   Moderate Pseudomonas aeruginosa  Moderate Serratia marcescens  Moderate Stenotrophomonas maltophilia   Few polymorphonuclear leukocytes per low power field  Rare Squamous epithelial cells per low power field  Few Gram Negative Rods per oil power field 09-26 @ 12:50  .Blood Blood-Peripheral   No growth to date. -- 09-25 @ 21:55      Rapid RVP Result: NotDetec (09-25 @ 15:06)          Radiology: ***  Bedside ultrasound: ***    CENTRAL LINE: N/Y          DATE INSERTED:              REMOVE: Y/N  REID: N/Y                       DATE INSERTED:              REMOVE: Y/N  A-LINE: N/Y                       DATE INSERTED:              REMOVE: Y/N    GLOBAL ISSUE/BEST PRACTICE:  Analgesia:  Sedation:  CAM-ICU:   [facility] elevation: yes  Stress ulcer prophylaxis:  VTE prophylaxis:  Glycemic control:  Nutrition:    CODE STATUS: ***    CRITICAL CARE TIME SPENT:  (Assessing presenting problems of acute illness, which pose high probability of life threatening deterioration or end organ damage/dysfunction, as well as medical decision making including initiating plan of care, reviewing data, reviewing radiologic exams, discussing with multidisciplinary team,  discussing goals of care with patient/family, and writing this note.  Non-inclusive of procedures performed)     HPI: 76 y/o F w/ pmh of htn, dm, CVA, dementia, recent hx of cardiac arrest at [facility], chronic resp failure s/p trach/peg presented to [facility] 09/25/2021 for agitation and hypotension, pt was found to have RYAN, leukocytosis, b/l pna w/ atelectasis and UA concerning for UTI, pt was started on IVAB and admitted to the SPCU.    24 hour events: Pt currently stable comfortable on current vent setting of 18/350/40%/8, no day time events reported.    Review of Systems: Unable to obtain pt trached/pegged    T(F): 97.4 (09-27-21 @ 16:03), Max: 98.8 (09-27-21 @ 12:56)  HR: 90 (09-27-21 @ 21:00) (79 - 109)  BP: 137/58 (09-27-21 @ 21:00) (91/64 - 206/134)  RR: 40 (09-27-21 @ 21:00) (13 - 40)  SpO2: 90% (09-27-21 @ 21:00) (88% - 100%)  Wt(kg): --    Mode: AC/ CMV (Assist Control/ Continuous Mandatory Ventilation), RR (machine): 18, TV (machine): 400, FiO2: 40, PEEP: 8  09-26-21 @ 07:01  -  09-27-21 @ 07:00  --------------------------------------------------------  IN: 3270 mL / OUT: 300 mL / NET: 2970 mL    09-27-21 @ 07:01  -  09-27-21 @ 21:09  --------------------------------------------------------  IN: 855 mL / OUT: 0 mL / NET: 855 mL        CAPILLARY BLOOD GLUCOSE      POCT Blood Glucose.: 148 mg/dL (27 Sep 2021 17:50)      I&O's Summary    26 Sep 2021 07:01  -  27 Sep 2021 07:00  --------------------------------------------------------  IN: 3270 mL / OUT: 300 mL / NET: 2970 mL    27 Sep 2021 07:01  -  27 Sep 2021 21:09  --------------------------------------------------------  IN: 855 mL / OUT: 0 mL / NET: 855 mL        Physical Exam:   Gen: Comfortable in bed in NAD  Neuro: awake but dose not follow commands  HEENT: PERRL  Resp: fair air entry b/l  CVS: +RRR  Abd: BSx4, soft, nt/nd  Ext: no edema   Skin: warm/dry    Meds:  piperacillin/tazobactam IVPB.. IV Intermittent      dextrose 40% Gel Oral  dextrose 50% Injectable IV Push  dextrose 50% Injectable IV Push  dextrose 50% Injectable IV Push  glucagon  Injectable IntraMuscular  insulin glargine Injectable (LANTUS) SubCutaneous  insulin lispro (ADMELOG) corrective regimen sliding scale SubCutaneous    ALBUTerol    90 MICROgram(s) HFA Inhaler Inhalation PRN  ipratropium 17 MICROgram(s) HFA Inhaler Inhalation    fentaNYL   Patch  12 MICROgram(s)/Hr Transdermal  LORazepam     Tablet Oral  LORazepam   Injectable IV Push PRN  methocarbamol Oral      aspirin  chewable Oral  heparin   Injectable SubCutaneous    bisacodyl Suppository Rectal  pantoprazole  Injectable IV Push  polyethylene glycol 3350 Oral PRN  senna Oral  simethicone Chew PRN  sucralfate suspension Enteral Tube      dextrose 5%. IV Continuous  dextrose 5%. IV Continuous  lactated ringers. IV Continuous      chlorhexidine 0.12% Liquid Oral Mucosa    lactobacillus acidophilus Oral                            8.4    7.82  )-----------( 286      ( 27 Sep 2021 12:08 )             28.3       09-27    140  |  111<H>  |  23  ----------------------------<  187<H>  4.5   |  23  |  0.93    Ca    8.6      27 Sep 2021 05:45  Phos  2.6     09-27  Mg     2.6     09-27    TPro  7.2  /  Alb  2.0<L>  /  TBili  0.4  /  DBili  x   /  AST  21  /  ALT  27  /  AlkPhos  117  09-27    Lactate 0.9           09-27 @ 05:45          PT/INR - ( 27 Sep 2021 05:45 )   PT: 13.5 sec;   INR: 1.12 ratio          .Sputum Sputum   Moderate Pseudomonas aeruginosa  Moderate Serratia marcescens  Moderate Stenotrophomonas maltophilia   Few polymorphonuclear leukocytes per low power field  Rare Squamous epithelial cells per low power field  Few Gram Negative Rods per oil power field 09-26 @ 12:50  .Blood Blood-Peripheral   No growth to date. -- 09-25 @ 21:55      Rapid RVP Result: NotDetec (09-25 @ 15:06)      Radiology:  < from: Xray Chest 1 View- PORTABLE-Urgent (Xray Chest 1 View- PORTABLE-Urgent .) (09.27.21 @ 10:23) >  EXAM:  XR CHEST PORTABLE URGENT 1V                            PROCEDURE DATE:  09/27/2021      INTERPRETATION:  Chest portable.    Clinical History: Tachypnea, respiratory distress.    Comparison: 9/25/2021.    Single AP view submitted.    Tracheostomy tube in place.    The evaluation of the cardiomediastinal silhouette is limited on portable technique.    Bilateral perihilar and lower lobe airspace consolidations, progressed from prior exam.  Low lung volumes.  Eventration right hemidiaphragm.  Possible small pleural effusions.    Impression:    Findings as discussed above.    --- End of Report ---    ELMER KEMP MD; Attending Radiologist  This document has been electronically signed. Sep 27 2021  3:34PM    < end of copied text >    < from: CT Abdomen and Pelvis No Cont (09.25.21 @ 19:36) >  EXAM:  CT ABDOMEN AND PELVIS                          EXAM:  CT CHEST                              PROCEDURE DATE:  09/25/2021          INTERPRETATION:  CLINICAL INFORMATION: Fever and abdominal pain    COMPARISON: CT abdomen pelvis 9/14/2021. CT chest 9/7/2021.    CONTRAST/COMPLICATIONS:  IV Contrast: NONE  Oral Contrast: NONE  Complications: None reported at time of study completion    PROCEDURE:  CT of the Chest, Abdomen and Pelvis was performed.  Sagittal and coronal reformats were performed.    FINDINGS: Motion limited evaluation.    CHEST:  LUNGS, PLEURA AND LARGE AIRWAYS: Tracheostomy tube in place.  Small bilateral pleural effusions with associated atelectasis.  Bilateral prominent patchy dense airspace opacities. Correlate clinically for pneumonia.    VESSELS: Within normal limits.  HEART: Heart size is normal. No pericardial effusion. Coronary artery and valvular calcifications.  MEDIASTINUM AND JHONNY: Enlarged mediastinal lymph nodes for example a prevascular node measuresat least 2.5 x 1.4 cm.  Air dilated esophagus, unchanged.  CHEST WALL AND LOWER NECK: Within normal limits.    ABDOMEN AND PELVIS:  LIVER: Within normal limits.  BILE DUCTS: Normal caliber.  GALLBLADDER: Within normal limits.  SPLEEN: Within normal limits.  PANCREAS: Within normal limits.  ADRENALS: Within normal limits.  KIDNEYS/URETERS: Within normal limits.    BLADDER: Urinary bladder collapsed around a Reid catheter limiting evaluation.  REPRODUCTIVE ORGANS: Uterus and adnexa within normal limits.    BOWEL: Dilated fluid-filled rectum. No rectal wall thickening. Remainder of the colon is mildly dilated with some fluid. No proximal obstruction. PERITONEUM: No ascites.  VESSELS: Within normal limits.  RETROPERITONEUM/LYMPH NODES: No lymphadenopathy.  ABDOMINAL WALL: Within normal limits.  BONES: Degenerative changes. Diffuse osteopenia. Posttraumatic changes to the left femur are stable.    IMPRESSION: Motion limited examination. Examination also limited without intravenous contrast.    Dense bilateral lung consolidations. New since the previous exam. Mediastinal lymphadenopathy. Small bilateral effusions and bibasilar atelectasis. Findings may represent infection, correlate clinically and follow to resolution.    Dilated fluid-filled rectum without rectal wall thickening unchanged from previous exam. Remainder of the colon is mildly dilated with some fluid. Unchanged.    --- End of Report ---      JOSHUA MERCADO MD; Attending Radiologist  This document has been electronically signed. Sep 25 2021  7:48PM      CENTRAL LINE: N  REID: N  A-LINE: N    GLOBAL ISSUE/BEST PRACTICE:  Analgesia: Y  Sedation: N  CAM-ICU: n/a  HOB elevation: Y  Stress ulcer prophylaxis: Y  VTE prophylaxis: Y  Glycemic control:   Nutrition: Y    CODE STATUS: FULL CODE

## 2021-09-27 NOTE — PROGRESS NOTE ADULT - ASSESSMENT
74 y/o F w/ pmh of htn, dm, CVA, dementia, recent hx of cardiac arrest at [facility], chronic resp failure s/p trach/peg presented to [facility] 09/25/2021 for agitation and hypotension, pt was found to have RYAN, leukocytosis, b/l pna w/ atelectasis and UA concerning for UTI, pt was started on IVAB and admitted to the SPCU.    -Neuro: CVA/Dementia no acute issue at this time cont ativan for vent synchrony  -Cardiac: HDS no acute issues, Htn stable off meds, maintain MAP >65  -Resp: Acute on chronic hypoxic resp failure 2/2 to pna + atelectasis + pleural effusions cont lung protective ventilation titrate vent setting as patient tolerates, cont pulm toliet, goal is maintain o2 >88%   -GI: no acute issues cont TF as ordered/tolerated, GI ppx w/ protonix, cont bowel regimen  -Renal: no acute issues cont to monitor renal function and lytes  -ID:  76 y/o F w/ pmh of htn, dm, CVA, dementia, recent hx of cardiac arrest at [facility], chronic resp failure s/p trach/peg presented to [facility] 09/25/2021 for agitation and hypotension, pt was found to have RYAN, leukocytosis, b/l pna w/ atelectasis and UA concerning for UTI, pt was started on IVAB and admitted to the SPCU.    -Neuro: CVA/Dementia no acute issue at this time cont ativan for vent synchrony  -Cardiac: HDS no acute issues, Htn stable off meds, maintain MAP >65  -Resp: Acute on chronic hypoxic resp failure 2/2 to pna + atelectasis + pleural effusions cont lung protective ventilation, will attempted to titrate PEEP down and adjust vent setting as patient tolerates, cont pulm toliet, goal is maintain o2 >88%   -GI: no acute issues cont TF as ordered/tolerated, GI ppx w/ protonix, cont bowel regimen  -Renal: no acute issues cont to monitor renal function and lytes, d/c IVF will start free water via peg tube 200ml q6h  -ID: Pna cont IV vanco/zosyn f/u on cx data, MRSA swap pending  -Endo: DM cont ISS goal is to maintain FS b/w 140-180  -Heme: DVT ppx w/ lovenox  -Dispo: Pt remains in SPCU currently full code, dispo pending

## 2021-09-27 NOTE — PROGRESS NOTE ADULT - SUBJECTIVE AND OBJECTIVE BOX
[facility] DIVISION of INFECTIOUS DISEASE  Arturo Olivas MD PhD, Haylee Umanzor MD, Andressa Brantley MD, Billy Valenzuela MD  and providing coverage with Alexa Canas MD and Eusebio Chairez MD  Providing Infectious Disease Consultations at [facility], [facility], [facility]    Office# [phone] to schedule follow up appointments  Answering Service for urgent calls or New Consults [phone]  Cell# to text for urgent issues Arturo Olivas [phone]     infectious diseases progress note:    RBEA BECKHAM is a 75y y. o. Female patient    Patient remains intubated, alert nonverbal    Allergies    codeine (Hives)    Intolerances        ANTIBIOTICS/RELEVANT:  antimicrobials  piperacillin/tazobactam IVPB.. 3.375 Gram(s) IV Intermittent every 8 hours    immunologic:    OTHER:  ALBUTerol    90 MICROgram(s) HFA Inhaler 1 Puff(s) Inhalation every 6 hours PRN  aspirin  chewable 81 milliGRAM(s) Oral daily  bisacodyl Suppository 10 milliGRAM(s) Rectal at bedtime  chlorhexidine 0.12% Liquid 15 milliLiter(s) Oral Mucosa every 12 hours  dextrose 40% Gel 15 Gram(s) Oral once  dextrose 5%. 1000 milliLiter(s) IV Continuous <Continuous>  dextrose 5%. 1000 milliLiter(s) IV Continuous <Continuous>  dextrose 50% Injectable 25 Gram(s) IV Push once  dextrose 50% Injectable 12.5 Gram(s) IV Push once  dextrose 50% Injectable 25 Gram(s) IV Push once  fentaNYL   Patch  12 MICROgram(s)/Hr 1 Patch Transdermal every 72 hours  glucagon  Injectable 1 milliGRAM(s) IntraMuscular once  heparin   Injectable 5000 Unit(s) SubCutaneous every 8 hours  insulin glargine Injectable (LANTUS) 36 Unit(s) SubCutaneous at bedtime  insulin lispro (ADMELOG) corrective regimen sliding scale   SubCutaneous every 6 hours  ipratropium 17 MICROgram(s) HFA Inhaler 1 Puff(s) Inhalation every 6 hours  lactated ringers. 1000 milliLiter(s) IV Continuous <Continuous>  lactobacillus acidophilus 1 Tablet(s) Oral daily  LORazepam     Tablet 1 milliGRAM(s) Oral every 6 hours  LORazepam   Injectable 1 milliGRAM(s) IV Push every 6 hours PRN  methocarbamol 250 milliGRAM(s) Oral two times a day  pantoprazole  Injectable 40 milliGRAM(s) IV Push daily  polyethylene glycol 3350 17 Gram(s) Oral every 12 hours PRN  senna 1 Tablet(s) Oral daily  simethicone 80 milliGRAM(s) Chew two times a day PRN  sucralfate suspension 1 Gram(s) Enteral Tube four times a day      Objective:  Last 24-Vital Signs Last 24 Hrs  T(C): 37.1 (27 Sep 2021 08:22), Max: 37.1 (27 Sep 2021 08:22)  T(F): 98.7 (27 Sep 2021 08:22), Max: 98.7 (27 Sep 2021 08:22)  HR: 82 (27 Sep 2021 08:00) (80 - 105)  BP: 116/53 (27 Sep 2021 08:00) (98/49 - 158/70)  BP(mean): 72 (27 Sep 2021 08:00) (64 - 97)  RR: 20 (27 Sep 2021 08:00) (14 - 39)  SpO2: 100% (27 Sep 2021 08:00) (88% - 100%)    T(C): 37.1 (21 @ 08:22), Max: 38.1 (21 @ 14:29)  T(F): 98.7 (21 @ 08:22), Max: 100.6 (21 @ 14:29)  T(C): 37.1 (21 @ 08:22), Max: 38.1 (21 @ 14:29)  T(F): 98.7 (21 @ 08:22), Max: 100.6 (21 @ 14:29)  T(C): 37.1 (21 @ 08:22), Max: 38.1 (21 @ 14:29)  T(F): 98.7 (21 @ 08:22), Max: 100.6 (21 @ 14:29)    PHYSICAL EXAM:  Constitutional: Well-developed, well nourished  Eyes: PERRLA, EOMI  Ear/Nose/Throat: oropharynx normal	  Neck: no JVD, no lymphadenopathy, supple, intubated via trach  Respiratory: no accessory muscle use, lung fields bilaterally clear  Cardiovascular: distant  Gastrointestinal: soft, NT, no HSM, BS-normal  Extremities: no clubbing, no cyanosis,   Neuro: patient alert    Mode: AC/ CMV (Assist Control/ Continuous Mandatory Ventilation), RR (machine): 18, TV (machine): 400, FiO2: 35, PEEP: 8, ITime: 1, MAP: 16, PIP: 32    LABS:                        7.7    6.92  )-----------( 342      ( 27 Sep 2021 05:45 )             25.4       WBC 6.92   @ 05:45  WBC 9.62   @ 06:41  WBC 13.61   @ 15:01          142  |  112<H>  |  32<H>  ----------------------------<  125<H>  4.7   |  21<L>  |  1.00    Ca    8.5      26 Sep 2021 06:41  Phos  3.0       Mg     2.9         TPro  7.6  /  Alb  2.1<L>  /  TBili  0.4  /  DBili  x   /  AST  34  /  ALT  30  /  AlkPhos  138<H>        Creatinine, Serum: 1.00 mg/dL (21 @ 06:41)  Creatinine, Serum: 1.31 mg/dL (21 @ 15:02)      PT/INR - ( 27 Sep 2021 05:45 )   PT: 13.5 sec;   INR: 1.12 ratio         PTT - ( 25 Sep 2021 15:02 )  PTT:45.0 sec  Urinalysis Basic - ( 25 Sep 2021 15:02 )    Color: Yellow / Appearance: Turbid / S.010 / pH: x  Gluc: x / Ketone: Trace  / Bili: Negative / Urobili: Negative mg/dL   Blood: x / Protein: 100 mg/dL / Nitrite: Negative   Leuk Esterase: Moderate / RBC: 6-10 /HPF / WBC 26-50   Sq Epi: x / Non Sq Epi: Few / Bacteria: Many            MICROBIOLOGY:    Culture - Sputum . (21 @ 12:50)    Gram Stain:   Few polymorphonuclear leukocytes per low power field  Rare Squamous epithelial cells per low power field  Few Gram Negative Rods per oil power field    Specimen Source: .Sputum Sputum    Culture - Sputum . (21 @ 13:17)    -  Piperacillin/Tazobactam: S <=8    -  Piperacillin/Tazobactam: S <=8    -  Tobramycin: S <=2    -  Tobramycin: I 8    -  Trimethoprim/Sulfamethoxazole: S <=0.5/9.5    -  Imipenem: S <=1    -  Levofloxacin: S <=0.5    -  Levofloxacin: R 2    -  Meropenem: S <=1    -  Meropenem: S <=1    Gram Stain:   Moderate polymorphonuclear leukocytes per low power field  Few Squamous epithelial cells per low power field  Moderate Gram Positive Rods per oil power field  Moderate Gram Negative Rods per oil power field  Few Yeast like cells per oil power field  Moderate Gram Negative Diplococci per oil power field    -  Amikacin: S <=16    -  Amikacin: S <=16    -  Amoxicillin/Clavulanic Acid: R >16/8    -  Ampicillin: R >16 These ampicillin results predict results for amoxicillin    -  Ampicillin/Sulbactam: R >16/8 Enterobacter, Citrobacter, and Serratia may develop resistance during prolonged therapy (3-4 days)    -  Aztreonam: S <=4    -  Aztreonam: S <=4    -  Cefepime: S <=2    -  Cefepime: S <=2    -  Cefazolin: R >16 Enterobacter, Citrobacter, and Serratia may develop resistance during prolonged therapy (3-4 days)    -  Cefoxitin: R <=8    -  Ceftriaxone: I 2 Enterobacter, Citrobacter, and Serratia may develop resistance during prolonged therapy    -  Ciprofloxacin: R >2    -  Ciprofloxacin: I 1    -  Ceftazidime: S <=1    -  Ertapenem: S <=0.5    -  Gentamicin: S <=2    -  Gentamicin: R >8    Specimen Source: .Sputum Sputum    Culture Results:   Numerous Serratia marcescens  Numerous Pseudomonas aeruginosa  Normal Respiratory Elvira present    Organism Identification: Serratia marcescens  Pseudomonas aeruginosa    Organism: Serratia marcescens    Organism: Pseudomonas aeruginosa    Method Type: PRATIK    Method Type: PRATIK        RADIOLOGY & ADDITIONAL STUDIES:  < from: CT Chest No Cont (21 @ 19:35) >    EXAM:  CT ABDOMEN AND PELVIS                          EXAM:  CT CHEST                                  PROCEDURE DATE:  2021          INTERPRETATION:  CLINICAL INFORMATION: Fever and abdominal pain    COMPARISON: CT abdomen pelvis 2021. CT chest 2021.    CONTRAST/COMPLICATIONS:  IV Contrast: NONE  Oral Contrast: NONE  Complications: None reported at time of study completion    PROCEDURE:  CT of the Chest, Abdomen and Pelvis was performed.  Sagittal and coronal reformats were performed.    FINDINGS: Motion limited evaluation.    CHEST:  LUNGS, PLEURA AND LARGE AIRWAYS: Tracheostomy tube in place.  Small bilateral pleural effusions with associated atelectasis.  Bilateral prominent patchy dense airspace opacities. Correlate clinically for pneumonia.    VESSELS: Within normal limits.  HEART: Heart size is normal. No pericardial effusion. Coronary artery and valvular calcifications.  MEDIASTINUM AND JHONNY: Enlarged mediastinal lymph nodes for example a prevascular node measuresat least 2.5 x 1.4 cm.  Air dilated esophagus, unchanged.  CHEST WALL AND LOWER NECK: Within normal limits.    ABDOMEN AND PELVIS:  LIVER: Within normal limits.  BILE DUCTS: Normal caliber.  GALLBLADDER: Within normal limits.  SPLEEN: Within normal limits.  PANCREAS: Within normal limits.  ADRENALS: Within normal limits.  KIDNEYS/URETERS: Within normal limits.    BLADDER: Urinary bladder collapsed around a Woody catheter limiting evaluation.  REPRODUCTIVE ORGANS: Uterus and adnexa within normal limits.    BOWEL: Dilated fluid-filled rectum. No rectal wall thickening. Remainder of the colon is mildly dilated with some fluid. No proximal obstruction. PERITONEUM: No ascites.  VESSELS: Within normal limits.  RETROPERITONEUM/LYMPH NODES: No lymphadenopathy.  ABDOMINAL WALL: Within normal limits.  BONES: Degenerative changes. Diffuse osteopenia. Posttraumatic changes to the left femur are stable.    IMPRESSION: Motion limited examination. Examination also limited without intravenous contrast.    Dense bilateral lung consolidations. New since the previous exam. Mediastinal lymphadenopathy. Small bilateral effusions and bibasilar atelectasis. Findings may represent infection, correlate clinically and follow to resolution.    Dilated fluid-filled rectum without rectal wall thickening unchanged from previous exam. Remainder of the colon is mildly dilated with some fluid. Unchanged.

## 2021-09-27 NOTE — PROGRESS NOTE ADULT - ASSESSMENT
CHAPINCITO GUIDRY 75 f [facility] S 9/24/2021   DR ILIANA KEMP     REVIEW OF SYMPTOMS      Able to give (reliable) ROS  NO     PHYSICAL EXAM    HEENT Unremarkable  atraumatic   RESP Fair air entry EXP prolonged    Harsh breath sound Resp distres mild   CARDIAC S1 S2 No S3     NO JVD    ABDOMEN SOFT BS PRESENT NOT DISTENDED No hepatosplenomegaly   PEDAL EDEMA present No calf tenderness  NO rash                                    PATIENT PRESENTATION.  74 f [facility] OBS cva chr vdrf peg past ho vap past ho pseudomonas sp recently  admitted 9/7-9/15 sp cac asp pneu  Rxed with zosyn  Shock and lacticemia poa resolvd   Had sz 9/9 Noted to have anemia   Has toe lesion seen by podiatry 9/10  Patient now readmitted 9/25/2021 with resp distress   On arrival 120/57 116 24 o vent        PROBLEMS.        VAP poa 9/25  UTI poa 9/25  VDRF ([facility])  PEG ([facility])  ANOXIC ENCEPH ([facility])   WORSENING CXR 9/27/2021     EVENTS.    9/27/2021 High pressuring vent chnged to vt 350   9/27/2021 hb 8.4   9/27/2021 cxr bl perihilar and lower lobe consolidations progressive   eventration r diaphr   926 sp stenotrophomonas pseucomonas serratia       BEST PRACTICE ISSUES.                                                  HEAD OF BED ELEVATION. Yes  DVT PROPHYLAXIS.   [facility] 9/26/2021                                       SQUIRES PROPHYLAXIS.  carafate (9/25)                                                                                         DIET.            jevity 1.5 30/h 9/26/2021 gt               INFECTION PROPHYLAXIS.   chlorhexidine .12% 9/26/2021                      GENERAL ISSUES  GOC ADVANCED DIRECTIVE.                                         ALLERGY.  codeine                            WEIGHT.      9/25/2021 61                              BMI.                 9/25/2021 22       PATIENT DATA   TUBES  PROCEDURES.     poa Trach  poa peg    ABG.   9/26 7p 40%/vent 743/39/76    OXYGENATION.                   VITALS/IO/VENT/DRIPS.    9/27/2021 afeb 80 100/50   9/27/2021 18/400/8/40  9/27/2021 u 300     ASSESSMENT RECOMMENDATIONS .    VDRF anoxic encephalopathy patient  admitted 9/25/2021 with VAP UTI resp distress started on bsab    VDRF.   Lung protective ventilation  target po 90-95%  9/26 gas exchange ok  9/27/2021 High pip   will decrease vt to 350 and order abg   WORSENING CXR 9/27/2021 9/27/2021 cxr bl perihilar and lower lobe consolidations progressive   eventration r diaphr   926 sp stenotrophomonas pseucomonas serratia  zosyn 9/25  ID Dr Valenzuela on case   Will dw id re whether to escalate abio   HEMODYNAMICS.   Being monitored Stable/Under control  COPD.  Being monitored Stable/Under control  ANEMIA.  Being monitored Stable/Under control  RYAN.  Being monitored Stable/Under control  ELEVATED LFTS.  Being monitored Stable/Under control    TIME SPENT   Over 36 minutes aggregate critical care time spent on encounter; activities included   direct patient care, counseling and/or coordinating care reviewing notes, lab data/ imaging , discussion with multidisciplinary team/ patient  /family and explaining in detail risks, benefits, alternatives  of the recommendations     CHAPINCITO GUIDRY 75 f [facility] S 9/24/2021   DR ILIANA KEMP

## 2021-09-27 NOTE — PROGRESS NOTE ADULT - SUBJECTIVE AND OBJECTIVE BOX
Patient is a 75y Female whom presented to the hospital with ckd and manasa     PAST MEDICAL & SURGICAL HISTORY:  Dementia of frontal lobe type    Aphasic stroke    Diabetes mellitus    Respiratory failure    Hypertension    GERD (gastroesophageal reflux disease)    Constipation    Respiratory failure    CVA (cerebral vascular accident)    HTN (hypertension)    DM (diabetes mellitus)    Advanced dementia    COVID-19 virus detected    Quadriplegia    Pneumonia    Type II diabetes mellitus    Hx of appendectomy    Gastrostomy in place    Tracheostomy in place    Tracheostomy tube present    Feeding by G-tube        MEDICATIONS  (STANDING):  aspirin  chewable 81 milliGRAM(s) Oral daily  chlorhexidine 0.12% Liquid 15 milliLiter(s) Oral Mucosa every 12 hours  chlorhexidine 4% Liquid 1 Application(s) Topical <User Schedule>  dextrose 40% Gel 15 Gram(s) Oral once  dextrose 5%. 1000 milliLiter(s) (50 mL/Hr) IV Continuous <Continuous>  dextrose 5%. 1000 milliLiter(s) (100 mL/Hr) IV Continuous <Continuous>  dextrose 50% Injectable 25 Gram(s) IV Push once  dextrose 50% Injectable 12.5 Gram(s) IV Push once  dextrose 50% Injectable 25 Gram(s) IV Push once  glucagon  Injectable 1 milliGRAM(s) IntraMuscular once  heparin   Injectable 5000 Unit(s) SubCutaneous every 8 hours  insulin lispro (ADMELOG) corrective regimen sliding scale   SubCutaneous every 6 hours  ipratropium 17 MICROgram(s) HFA Inhaler 1 Puff(s) Inhalation every 6 hours  lactated ringers. 1000 milliLiter(s) (80 mL/Hr) IV Continuous <Continuous>  lactobacillus acidophilus 1 Tablet(s) Oral daily  norepinephrine Infusion 0.05 MICROgram(s)/kG/Min (5.74 mL/Hr) IV Continuous <Continuous>  pantoprazole  Injectable 40 milliGRAM(s) IV Push daily  piperacillin/tazobactam IVPB.. 3.375 Gram(s) IV Intermittent every 8 hours  senna 1 Tablet(s) Oral daily      Allergies    codeine (Hives)    Intolerances        SOCIAL HISTORY:  Denies ETOh,Smoking,     FAMILY HISTORY:  No pertinent family history in first degree relatives        REVIEW OF SYSTEMS:  unable to obtained a good review system                                 8.4    7.82  )-----------( 286      ( 27 Sep 2021 12:08 )             28.3       CBC Full  -  ( 27 Sep 2021 12:08 )  WBC Count : 7.82 K/uL  RBC Count : 3.03 M/uL  Hemoglobin : 8.4 g/dL  Hematocrit : 28.3 %  Platelet Count - Automated : 286 K/uL  Mean Cell Volume : 93.4 fl  Mean Cell Hemoglobin : 27.7 pg  Mean Cell Hemoglobin Concentration : 29.7 gm/dL  Auto Neutrophil # : x  Auto Lymphocyte # : x  Auto Monocyte # : x  Auto Eosinophil # : x  Auto Basophil # : x  Auto Neutrophil % : x  Auto Lymphocyte % : x  Auto Monocyte % : x  Auto Eosinophil % : x  Auto Basophil % : x      09-27    140  |  111<H>  |  23  ----------------------------<  187<H>  4.5   |  23  |  0.93    Ca    8.6      27 Sep 2021 05:45  Phos  2.6     09-27  Mg     2.6     09-27    TPro  7.2  /  Alb  2.0<L>  /  TBili  0.4  /  DBili  x   /  AST  21  /  ALT  27  /  AlkPhos  117  09-27      CAPILLARY BLOOD GLUCOSE      POCT Blood Glucose.: 148 mg/dL (27 Sep 2021 17:50)  POCT Blood Glucose.: 206 mg/dL (27 Sep 2021 11:34)  POCT Blood Glucose.: 196 mg/dL (27 Sep 2021 05:19)  POCT Blood Glucose.: 198 mg/dL (26 Sep 2021 23:41)      Vital Signs Last 24 Hrs  T(C): 36.3 (27 Sep 2021 16:03), Max: 37.1 (27 Sep 2021 08:22)  T(F): 97.4 (27 Sep 2021 16:03), Max: 98.8 (27 Sep 2021 12:56)  HR: 89 (27 Sep 2021 18:01) (79 - 109)  BP: 116/69 (27 Sep 2021 18:01) (91/64 - 206/134)  BP(mean): 78 (27 Sep 2021 18:01) (69 - 156)  RR: 33 (27 Sep 2021 18:01) (13 - 39)  SpO2: 98% (27 Sep 2021 18:01) (88% - 100%)        PT/INR - ( 27 Sep 2021 05:45 )   PT: 13.5 sec;   INR: 1.12 ratio           PHYSICAL EXAM:    Constitutional: NAD  HEENT: conjunctive   clear   Neck:  No JVD  Respiratory: decrease bs b/l   Cardiovascular: S1 and S2  Gastrointestinal: BS+, soft, pos peg   Extremities: No peripheral edema

## 2021-09-27 NOTE — PROGRESS NOTE ADULT - SUBJECTIVE AND OBJECTIVE BOX
Patient is a 75y old  Female who presents with a chief complaint of Respiratory distress. (27 Sep 2021 09:54)    INTERVAL HPI/OVERNIGHT EVENTS:    Hb was low, repeat showed improved without any intervention.  Known hx anemia. Only to give transfusion if Hb is < 7.      MEDICATIONS  (STANDING):  aspirin  chewable 81 milliGRAM(s) Oral daily  bisacodyl Suppository 10 milliGRAM(s) Rectal at bedtime  chlorhexidine 0.12% Liquid 15 milliLiter(s) Oral Mucosa every 12 hours  dextrose 40% Gel 15 Gram(s) Oral once  dextrose 5%. 1000 milliLiter(s) (50 mL/Hr) IV Continuous <Continuous>  dextrose 5%. 1000 milliLiter(s) (100 mL/Hr) IV Continuous <Continuous>  dextrose 50% Injectable 25 Gram(s) IV Push once  dextrose 50% Injectable 12.5 Gram(s) IV Push once  dextrose 50% Injectable 25 Gram(s) IV Push once  fentaNYL   Patch  12 MICROgram(s)/Hr 1 Patch Transdermal every 72 hours  glucagon  Injectable 1 milliGRAM(s) IntraMuscular once  heparin   Injectable 5000 Unit(s) SubCutaneous every 8 hours  insulin glargine Injectable (LANTUS) 36 Unit(s) SubCutaneous at bedtime  insulin lispro (ADMELOG) corrective regimen sliding scale   SubCutaneous every 6 hours  ipratropium 17 MICROgram(s) HFA Inhaler 1 Puff(s) Inhalation every 6 hours  lactated ringers. 1000 milliLiter(s) (125 mL/Hr) IV Continuous <Continuous>  lactobacillus acidophilus 1 Tablet(s) Oral daily  LORazepam     Tablet 1 milliGRAM(s) Oral every 6 hours  methocarbamol 250 milliGRAM(s) Oral two times a day  pantoprazole  Injectable 40 milliGRAM(s) IV Push daily  piperacillin/tazobactam IVPB.. 3.375 Gram(s) IV Intermittent every 8 hours  senna 1 Tablet(s) Oral daily  sucralfate suspension 1 Gram(s) Enteral Tube four times a day    MEDICATIONS  (PRN):  ALBUTerol    90 MICROgram(s) HFA Inhaler 1 Puff(s) Inhalation every 6 hours PRN Shortness of Breath and/or Wheezing  LORazepam   Injectable 1 milliGRAM(s) IV Push every 6 hours PRN agtation and vent dyssynchrony  polyethylene glycol 3350 17 Gram(s) Oral every 12 hours PRN Constipation  simethicone 80 milliGRAM(s) Chew two times a day PRN Dyspepsia      Allergies    codeine (Hives)    Intolerances    Vital Signs Last 24 Hrs  T(C): 37.1 (27 Sep 2021 12:56), Max: 37.1 (27 Sep 2021 08:22)  T(F): 98.8 (27 Sep 2021 12:56), Max: 98.8 (27 Sep 2021 12:56)  HR: 83 (27 Sep 2021 13:34) (80 - 105)  BP: 116/53 (27 Sep 2021 08:00) (105/56 - 158/70)  BP(mean): 72 (27 Sep 2021 08:00) (71 - 97)  RR: 20 (27 Sep 2021 08:00) (15 - 39)  SpO2: 98% (27 Sep 2021 13:34) (88% - 100%)    PHYSICAL EXAM:  GENERAL: NAD, well-developed, not currently in any distress.  HEAD:  Atraumatic, Norm cephalic.  EYES: PERRLA, right lateral gaze B/L, conjunctiva clear.  ENMT: no nasal discharge, MMM, unremarkable tracheostomy tube.   NECK: Supple, No JVD.  NERVOUS SYSTEM:  Awake with open eyes but nonverbal, quadriplegic, supine with contracture position, forearms crossed over the chest  CHEST/LUNG: Fair air entry B/L, coarse rales B/L middle & lower lung zones, left > right, no rhonchi, or wheezing.  HEART: Normal S1 & S2, no murmurs, or extra sounds.  ABDOMEN: Soft, non-distended; bowel sounds present, no palpable masses or organomegaly, unremarkable PEG in place.  EXTREMITIES:  No clubbing, cyanosis, or edema, diffuse muscle wasting.  VASCULAR: 2+ radial, DPA / PTA pulses B/L.  SKIN: No rashes, (+) right bid toe tip ulcer, (+) gangrenous left toe tip with eschar.  PSYCH: nor agitation currently (received Ativan IVP earlier at the ED).    LABS:                        8.4    7.82  )-----------( 286      ( 27 Sep 2021 12:08 )             28.3     27 Sep 2021 05:45    140    |  111    |  23     ----------------------------<  187    4.5     |  23     |  0.93     Ca    8.6        27 Sep 2021 05:45  Phos  2.6       27 Sep 2021 05:45  Mg     2.6       27 Sep 2021 05:45    TPro  7.2    /  Alb  2.0    /  TBili  0.4    /  DBili  x      /  AST  21     /  ALT  27     /  AlkPhos  117    27 Sep 2021 05:45    PT/INR - ( 27 Sep 2021 05:45 )   PT: 13.5 sec;   INR: 1.12 ratio         PTT - ( 25 Sep 2021 15:02 )  PTT:45.0 sec  Urinalysis Basic - ( 25 Sep 2021 15:02 )    Color: Yellow / Appearance: Turbid / S.010 / pH: x  Gluc: x / Ketone: Trace  / Bili: Negative / Urobili: Negative mg/dL   Blood: x / Protein: 100 mg/dL / Nitrite: Negative   Leuk Esterase: Moderate / RBC: 6-10 /HPF / WBC 26-50   Sq Epi: x / Non Sq Epi: Few / Bacteria: Many      CAPILLARY BLOOD GLUCOSE      POCT Blood Glucose.: 206 mg/dL (27 Sep 2021 11:34)  POCT Blood Glucose.: 196 mg/dL (27 Sep 2021 05:19)  POCT Blood Glucose.: 198 mg/dL (26 Sep 2021 23:41)  POCT Blood Glucose.: 154 mg/dL (26 Sep 2021 18:01)      RADIOLOGY & ADDITIONAL TESTS:    Imaging Personally Reviewed:  [ ] YES     Consultant(s) Notes Reviewed:      Care Discussed with Consultants/Other Providers:    Advanced Directives: [ ] DNR  [ ] No feeding tube  [ ] MOLST in chart  [ ] MOLST completed today  [ ] Unknown

## 2021-09-27 NOTE — PROGRESS NOTE ADULT - SUBJECTIVE AND OBJECTIVE BOX
BREA BECKHAM    [facility]    Allergies    codeine (Hives)    Intolerances        PAST MEDICAL & SURGICAL HISTORY:  Dementia of frontal lobe type    Aphasic stroke    Diabetes mellitus    Respiratory failure    Hypertension    GERD (gastroesophageal reflux disease)    Constipation    Respiratory failure    CVA (cerebral vascular accident)    HTN (hypertension)    DM (diabetes mellitus)    Advanced dementia    COVID-19 virus detected    Quadriplegia    Pneumonia    Type II diabetes mellitus    Hx of appendectomy    Gastrostomy in place    Tracheostomy in place    Tracheostomy tube present    Feeding by G-tube        FAMILY HISTORY:  No pertinent family history in first degree relatives        Home Medications:  acetaminophen 160 mg/5 mL oral suspension: 20.31 milliliter(s) by gastrostomy tube every 6 hours, As Needed (2021 09:08)  albuterol 90 mcg/inh inhalation aerosol: 2 puff(s) inhaled every 6 hours (2021 09:08)  aspirin 81 mg oral tablet, chewable: 1 tab(s) by PEG tube once a day (15 Zay 2021 15:07)  Bacid (LAC) oral tablet: 2 tab(s) by gastrostomy tube once a day (2021 09:08)  bisacodyl 5 mg oral delayed release tablet: 1 tab(s) orally once a day (at bedtime) (2021 09:08)  Carafate 1 g/10 mL oral suspension: 10 milliliter(s) by gastrostomy tube 4 times a day (before meals and at bedtime) for 14 days (Started 21) (15 Zay 2021 15:07)  chlorhexidine 0.12% mucous membrane liquid: 15 milliliter(s) mucous membrane 2 times a day (15 Sep 2021 12:08)  insulin glargine: 36 unit(s) subcutaneous once a day (at bedtime) (15 Sep 2021 12:08)  ipratropium-albuterol 0.5 mg-2.5 mg/3 mL inhalation solution: 3 milliliter(s) inhaled 4 times a day (15 Zay 2021 15:07)  LORazepam 0.5 mg oral tablet: 1 tab(s) orally every 2 hours, As needed, Agitation (15 Sep 2021 12:08)  LORazepam 1 mg oral tablet: 1 tab(s) orally every 6 hours (15 Sep 2021 12:08)  methocarbamol: 250 milligram(s) by gastrostomy tube 2 times a day (15 Sep 2021 12:08)  pantoprazole 40 mg oral granule, delayed release: 40 milligram(s) by gastrostomy tube 2 times a day (15 Sep 2021 12:08)  polyethylene glycol 3350 oral powder for reconstitution: 17 gram(s) orally every 12 hours (2021 09:08)  senna 8.6 mg oral tablet: 1 tab(s) by gastrostomy tube once a day (at bedtime) (2021 09:08)  simethicone 80 mg oral tablet, chewable: 1 tab(s) orally every 6 hours (15 Sep 2021 12:08)      MEDICATIONS  (STANDING):  aspirin  chewable 81 milliGRAM(s) Oral daily  bisacodyl Suppository 10 milliGRAM(s) Rectal at bedtime  chlorhexidine 0.12% Liquid 15 milliLiter(s) Oral Mucosa every 12 hours  dextrose 40% Gel 15 Gram(s) Oral once  dextrose 5%. 1000 milliLiter(s) (50 mL/Hr) IV Continuous <Continuous>  dextrose 5%. 1000 milliLiter(s) (100 mL/Hr) IV Continuous <Continuous>  dextrose 50% Injectable 25 Gram(s) IV Push once  dextrose 50% Injectable 12.5 Gram(s) IV Push once  dextrose 50% Injectable 25 Gram(s) IV Push once  fentaNYL   Patch  12 MICROgram(s)/Hr 1 Patch Transdermal every 72 hours  glucagon  Injectable 1 milliGRAM(s) IntraMuscular once  heparin   Injectable 5000 Unit(s) SubCutaneous every 8 hours  insulin glargine Injectable (LANTUS) 36 Unit(s) SubCutaneous at bedtime  insulin lispro (ADMELOG) corrective regimen sliding scale   SubCutaneous every 6 hours  ipratropium 17 MICROgram(s) HFA Inhaler 1 Puff(s) Inhalation every 6 hours  lactated ringers. 1000 milliLiter(s) (125 mL/Hr) IV Continuous <Continuous>  lactobacillus acidophilus 1 Tablet(s) Oral daily  LORazepam     Tablet 1 milliGRAM(s) Oral every 6 hours  methocarbamol 250 milliGRAM(s) Oral two times a day  pantoprazole  Injectable 40 milliGRAM(s) IV Push daily  piperacillin/tazobactam IVPB.. 3.375 Gram(s) IV Intermittent every 8 hours  senna 1 Tablet(s) Oral daily  sucralfate suspension 1 Gram(s) Enteral Tube four times a day    MEDICATIONS  (PRN):  ALBUTerol    90 MICROgram(s) HFA Inhaler 1 Puff(s) Inhalation every 6 hours PRN Shortness of Breath and/or Wheezing  LORazepam   Injectable 1 milliGRAM(s) IV Push every 6 hours PRN agtation and vent dyssynchrony  polyethylene glycol 3350 17 Gram(s) Oral every 12 hours PRN Constipation  simethicone 80 milliGRAM(s) Chew two times a day PRN Dyspepsia      Diet, NPO with Tube Feed:   Tube Feeding Modality: Gastrostomy  Jevity 1.5 Horacio  Total Volume for 24 Hours (mL): 720  Continuous  Starting Tube Feed Rate mL per Hour: 30  Until Goal Tube Feed Rate (mL per Hour): 30  Tube Feed Duration (in Hours): 24  Tube Feed Start Time: 19:00 (21 @ 18:08) [Active]          Vital Signs Last 24 Hrs  T(C): 37.1 (27 Sep 2021 08:22), Max: 37.1 (27 Sep 2021 08:22)  T(F): 98.7 (27 Sep 2021 08:22), Max: 98.7 (27 Sep 2021 08:22)  HR: 82 (27 Sep 2021 08:00) (80 - 105)  BP: 116/53 (27 Sep 2021 08:00) (98/49 - 158/70)  BP(mean): 72 (27 Sep 2021 08:00) (64 - 97)  RR: 20 (27 Sep 2021 08:00) (14 - 39)  SpO2: 100% (27 Sep 2021 08:00) (88% - 100%)      21 @ 07:01  -  21 @ 07:00  --------------------------------------------------------  IN: 3270 mL / OUT: 300 mL / NET: 2970 mL        Mode: AC/ CMV (Assist Control/ Continuous Mandatory Ventilation), RR (machine): 18, TV (machine): 400, FiO2: 35, PEEP: 8, ITime: 1, MAP: 16, PIP: 32      LABS:                        7.7    6.92  )-----------( 342      ( 27 Sep 2021 05:45 )             25.4         142  |  112<H>  |  32<H>  ----------------------------<  125<H>  4.7   |  21<L>  |  1.00    Ca    8.5      26 Sep 2021 06:41  Phos  3.0       Mg     2.9         TPro  7.6  /  Alb  2.1<L>  /  TBili  0.4  /  DBili  x   /  AST  34  /  ALT  30  /  AlkPhos  138<H>      PT/INR - ( 27 Sep 2021 05:45 )   PT: 13.5 sec;   INR: 1.12 ratio         PTT - ( 25 Sep 2021 15:02 )  PTT:45.0 sec  Urinalysis Basic - ( 25 Sep 2021 15:02 )    Color: Yellow / Appearance: Turbid / S.010 / pH: x  Gluc: x / Ketone: Trace  / Bili: Negative / Urobili: Negative mg/dL   Blood: x / Protein: 100 mg/dL / Nitrite: Negative   Leuk Esterase: Moderate / RBC: 6-10 /HPF / WBC 26-50   Sq Epi: x / Non Sq Epi: Few / Bacteria: Many        ABG - ( 26 Sep 2021 19:55 )  pH, Arterial: 7.43  pH, Blood: x     /  pCO2: 39    /  pO2: 76    / HCO3: 26    / Base Excess: 1.6   /  SaO2: 97.0                WBC:  WBC Count: 6.92 K/uL ( @ 05:45)  WBC Count: 9.62 K/uL ( @ 06:41)  WBC Count: 13.61 K/uL ( @ 15:01)      MICROBIOLOGY:  RECENT CULTURES:   .Sputum Sputum XXXX   Few polymorphonuclear leukocytes per low power field  Rare Squamous epithelial cells per low power field  Few Gram Negative Rods per oil power field XXXX     .Blood Blood-Peripheral XXXX XXXX   No growth to date.                PT/INR - ( 27 Sep 2021 05:45 )   PT: 13.5 sec;   INR: 1.12 ratio         PTT - ( 25 Sep 2021 15:02 )  PTT:45.0 sec    Sodium:  Sodium, Serum: 142 mmol/L ( @ 06:41)  Sodium, Serum: 137 mmol/L ( @ 15:02)      1.00 mg/dL  @ 06:41  1.31 mg/dL  @ 15:02      Hemoglobin:  Hemoglobin: 7.7 g/dL ( @ 05:45)  Hemoglobin: 8.6 g/dL ( @ 12:00)  Hemoglobin: 9.1 g/dL ( @ 06:41)  Hemoglobin: 10.4 g/dL ( @ 15:01)      Platelets: Platelet Count - Automated: 342 K/uL ( @ 05:45)  Platelet Count - Automated: 424 K/uL ( @ 06:41)  Platelet Count - Automated: 435 K/uL ( @ 15:01)      LIVER FUNCTIONS - ( 26 Sep 2021 06:41 )  Alb: 2.1 g/dL / Pro: 7.6 g/dL / ALK PHOS: 138 U/L / ALT: 30 U/L / AST: 34 U/L / GGT: x             Urinalysis Basic - ( 25 Sep 2021 15:02 )    Color: Yellow / Appearance: Turbid / S.010 / pH: x  Gluc: x / Ketone: Trace  / Bili: Negative / Urobili: Negative mg/dL   Blood: x / Protein: 100 mg/dL / Nitrite: Negative   Leuk Esterase: Moderate / RBC: 6-10 /HPF / WBC 26-50   Sq Epi: x / Non Sq Epi: Few / Bacteria: Many        RADIOLOGY & ADDITIONAL STUDIES:      MICROBIOLOGY:  RECENT CULTURES:   .Sputum Sputum XXXX   Few polymorphonuclear leukocytes per low power field  Rare Squamous epithelial cells per low power field  Few Gram Negative Rods per oil power field XXXX     .Blood Blood-Peripheral XXXX XXXX   No growth to date.

## 2021-09-27 NOTE — PROGRESS NOTE ADULT - ASSESSMENT
Patient is a 75 year old female with PMH of HTN, DM type 2, CVA, recent Cardiac Arrest, Chronic Respiratory Failure, Vent Dependant s/p trach & PEG, Anemia with GI blood loss, and Dementia who was sent from [facility] facility for acute respiratory distress  Sepsis due to recurrent ventilator associated pneumonia vs aspiration pneumonia, possible UTI    - fever 100.6F yesterday, now afebrile. Leukocytosis normalized. Sepsis resolved.    - trach to vent, FiO2 50%.     - CT imaging reviewed, new dense b/l lung consolidations, mediastinal lad, small b/l effusions and atelectasis    - RVP/COVID and legionella urine ag negative    - UA with pyuria, has vaughn in place with yellow urine; prior cx reviewed colonized with Proteus    - s/p vancomycin and pip-tazo, continued on pip-tazo, no further Vanco  Acute on chronic respiratory distress due to above  RYAN, nephrology following, recent adm with PNA with Serratia marcescens and Pseudomonas aeruginosa in sputm    Recommendations:   PNA-  based on prior micro and clinical response so far  Continue on pip-tazo (started late 9/25) so anticipate 5 days with last full day 9/30 but may extend based on sputum micro, clinical response and risk benefit of extended abx.  -will follow results of MRSA PCR/sputum  -pt remains in SPCU on ventilatory support    We will follow along in the care of this patient. Please call us at [phone] or text me directly on my cell# at [phone] with any concerns.

## 2021-09-27 NOTE — PROGRESS NOTE ADULT - PROBLEM SELECTOR PLAN 1
recurrent, versus vent associated PNA, start on aspiration precautions, patient was started on Zosyn & Vancomycin, continue, trend TLC and monitor temp, f/u culture results, ID consult with Dr. Velez was called.    nicole rosario'ed.    ID recs appreciated

## 2021-09-27 NOTE — PROGRESS NOTE ADULT - SUBJECTIVE AND OBJECTIVE BOX
Date/Time Patient Seen:  		  Referring MD:   Data Reviewed	       Patient is a 75y old  Female who presents with a chief complaint of Respiratory distress. (26 Sep 2021 19:34)      Subjective/HPI     PAST MEDICAL & SURGICAL HISTORY:  Dementia of frontal lobe type    Aphasic stroke    Diabetes mellitus    Respiratory failure    Hypertension    GERD (gastroesophageal reflux disease)    Constipation    Respiratory failure    CVA (cerebral vascular accident)    HTN (hypertension)    DM (diabetes mellitus)    Advanced dementia    COVID-19 virus detected    Quadriplegia    Pneumonia    Type II diabetes mellitus    Hx of appendectomy    Gastrostomy in place    Tracheostomy in place    Tracheostomy tube present    Feeding by G-tube          Medication list         MEDICATIONS  (STANDING):  aspirin  chewable 81 milliGRAM(s) Oral daily  bisacodyl Suppository 10 milliGRAM(s) Rectal at bedtime  chlorhexidine 0.12% Liquid 15 milliLiter(s) Oral Mucosa every 12 hours  dextrose 40% Gel 15 Gram(s) Oral once  dextrose 5%. 1000 milliLiter(s) (50 mL/Hr) IV Continuous <Continuous>  dextrose 5%. 1000 milliLiter(s) (100 mL/Hr) IV Continuous <Continuous>  dextrose 50% Injectable 25 Gram(s) IV Push once  dextrose 50% Injectable 12.5 Gram(s) IV Push once  dextrose 50% Injectable 25 Gram(s) IV Push once  fentaNYL   Patch  12 MICROgram(s)/Hr 1 Patch Transdermal every 72 hours  glucagon  Injectable 1 milliGRAM(s) IntraMuscular once  heparin   Injectable 5000 Unit(s) SubCutaneous every 8 hours  insulin glargine Injectable (LANTUS) 36 Unit(s) SubCutaneous at bedtime  insulin lispro (ADMELOG) corrective regimen sliding scale   SubCutaneous every 6 hours  ipratropium 17 MICROgram(s) HFA Inhaler 1 Puff(s) Inhalation every 6 hours  lactated ringers. 1000 milliLiter(s) (125 mL/Hr) IV Continuous <Continuous>  lactobacillus acidophilus 1 Tablet(s) Oral daily  LORazepam     Tablet 1 milliGRAM(s) Oral every 6 hours  methocarbamol 250 milliGRAM(s) Oral two times a day  pantoprazole  Injectable 40 milliGRAM(s) IV Push daily  piperacillin/tazobactam IVPB.. 3.375 Gram(s) IV Intermittent every 8 hours  senna 1 Tablet(s) Oral daily  sucralfate suspension 1 Gram(s) Enteral Tube four times a day    MEDICATIONS  (PRN):  ALBUTerol    90 MICROgram(s) HFA Inhaler 1 Puff(s) Inhalation every 6 hours PRN Shortness of Breath and/or Wheezing  LORazepam   Injectable 1 milliGRAM(s) IV Push every 6 hours PRN agtation and vent dyssynchrony  polyethylene glycol 3350 17 Gram(s) Oral every 12 hours PRN Constipation  simethicone 80 milliGRAM(s) Chew two times a day PRN Dyspepsia         Vitals log        ICU Vital Signs Last 24 Hrs  T(C): 36.6 (27 Sep 2021 04:04), Max: 36.7 (26 Sep 2021 15:59)  T(F): 97.9 (27 Sep 2021 04:04), Max: 98 (26 Sep 2021 15:59)  HR: 105 (27 Sep 2021 06:00) (80 - 105)  BP: 158/70 (27 Sep 2021 06:00) (98/49 - 158/70)  BP(mean): 97 (27 Sep 2021 06:00) (64 - 97)  ABP: --  ABP(mean): --  RR: 39 (27 Sep 2021 06:00) (14 - 39)  SpO2: 88% (27 Sep 2021 06:00) (88% - 100%)       Mode: AC/ CMV (Assist Control/ Continuous Mandatory Ventilation)  RR (machine): 18  TV (machine): 400  FiO2: 35  PEEP: 8  ITime: 0.1  MAP: 16  PIP: 33      Input and Output:  I&O's Detail    25 Sep 2021 07:01  -  26 Sep 2021 07:00  --------------------------------------------------------  IN:    IV PiggyBack: 200 mL    Lactated Ringers: 880 mL  Total IN: 1080 mL    OUT:    Indwelling Catheter - Urethral (mL): 750 mL    Norepinephrine: 0 mL  Total OUT: 750 mL    Total NET: 330 mL      26 Sep 2021 07:01  -  27 Sep 2021 06:36  --------------------------------------------------------  IN:    Enteral Tube Flush: 350 mL    IV PiggyBack: 100 mL    Jevity 1.5: 360 mL    Lactated Ringers: 1500 mL    Lactated Ringers: 960 mL  Total IN: 3270 mL    OUT:    Voided (mL): 300 mL  Total OUT: 300 mL    Total NET: 2970 mL          Lab Data                        7.7    6.92  )-----------( 342      ( 27 Sep 2021 05:45 )             25.4     09-26    142  |  112<H>  |  32<H>  ----------------------------<  125<H>  4.7   |  21<L>  |  1.00    Ca    8.5      26 Sep 2021 06:41  Phos  3.0     09-26  Mg     2.9     09-26    TPro  7.6  /  Alb  2.1<L>  /  TBili  0.4  /  DBili  x   /  AST  34  /  ALT  30  /  AlkPhos  138<H>  09-26    ABG - ( 26 Sep 2021 19:55 )  pH, Arterial: 7.43  pH, Blood: x     /  pCO2: 39    /  pO2: 76    / HCO3: 26    / Base Excess: 1.6   /  SaO2: 97.0                    Review of Systems	      Objective     Physical Examination    heart s1s2  lung dec BS  trach  peg      Pertinent Lab findings & Imaging      Annalise:  NO   Adequate UO     I&O's Detail    25 Sep 2021 07:01  -  26 Sep 2021 07:00  --------------------------------------------------------  IN:    IV PiggyBack: 200 mL    Lactated Ringers: 880 mL  Total IN: 1080 mL    OUT:    Indwelling Catheter - Urethral (mL): 750 mL    Norepinephrine: 0 mL  Total OUT: 750 mL    Total NET: 330 mL      26 Sep 2021 07:01  -  27 Sep 2021 06:36  --------------------------------------------------------  IN:    Enteral Tube Flush: 350 mL    IV PiggyBack: 100 mL    Jevity 1.5: 360 mL    Lactated Ringers: 1500 mL    Lactated Ringers: 960 mL  Total IN: 3270 mL    OUT:    Voided (mL): 300 mL  Total OUT: 300 mL    Total NET: 2970 mL               Discussed with:     Cultures:	        Radiology

## 2021-09-28 LAB
-  AMIKACIN: SIGNIFICANT CHANGE UP
-  AMIKACIN: SIGNIFICANT CHANGE UP
-  AMOXICILLIN/CLAVULANIC ACID: SIGNIFICANT CHANGE UP
-  AMPICILLIN/SULBACTAM: SIGNIFICANT CHANGE UP
-  AMPICILLIN: SIGNIFICANT CHANGE UP
-  AZTREONAM: SIGNIFICANT CHANGE UP
-  AZTREONAM: SIGNIFICANT CHANGE UP
-  CEFAZOLIN: SIGNIFICANT CHANGE UP
-  CEFEPIME: SIGNIFICANT CHANGE UP
-  CEFEPIME: SIGNIFICANT CHANGE UP
-  CEFOXITIN: SIGNIFICANT CHANGE UP
-  CEFTAZIDIME: SIGNIFICANT CHANGE UP
-  CEFTAZIDIME: SIGNIFICANT CHANGE UP
-  CEFTRIAXONE: SIGNIFICANT CHANGE UP
-  CIPROFLOXACIN: SIGNIFICANT CHANGE UP
-  CIPROFLOXACIN: SIGNIFICANT CHANGE UP
-  ERTAPENEM: SIGNIFICANT CHANGE UP
-  GENTAMICIN: SIGNIFICANT CHANGE UP
-  GENTAMICIN: SIGNIFICANT CHANGE UP
-  IMIPENEM: SIGNIFICANT CHANGE UP
-  LEVOFLOXACIN: SIGNIFICANT CHANGE UP
-  MEROPENEM: SIGNIFICANT CHANGE UP
-  MEROPENEM: SIGNIFICANT CHANGE UP
-  PIPERACILLIN/TAZOBACTAM: SIGNIFICANT CHANGE UP
-  PIPERACILLIN/TAZOBACTAM: SIGNIFICANT CHANGE UP
-  TOBRAMYCIN: SIGNIFICANT CHANGE UP
-  TOBRAMYCIN: SIGNIFICANT CHANGE UP
-  TRIMETHOPRIM/SULFAMETHOXAZOLE: SIGNIFICANT CHANGE UP
-  TRIMETHOPRIM/SULFAMETHOXAZOLE: SIGNIFICANT CHANGE UP
CULTURE RESULTS: SIGNIFICANT CHANGE UP
METHOD TYPE: SIGNIFICANT CHANGE UP
ORGANISM # SPEC MICROSCOPIC CNT: SIGNIFICANT CHANGE UP
SARS-COV-2 RNA SPEC QL NAA+PROBE: SIGNIFICANT CHANGE UP
SPECIMEN SOURCE: SIGNIFICANT CHANGE UP

## 2021-09-28 PROCEDURE — 99233 SBSQ HOSP IP/OBS HIGH 50: CPT

## 2021-09-28 RX ORDER — CHLORHEXIDINE GLUCONATE 213 G/1000ML
15 SOLUTION TOPICAL EVERY 12 HOURS
Refills: 0 | Status: DISCONTINUED | OUTPATIENT
Start: 2021-09-28 | End: 2021-10-04

## 2021-09-28 RX ORDER — DEXMEDETOMIDINE HYDROCHLORIDE IN 0.9% SODIUM CHLORIDE 4 UG/ML
0.2 INJECTION INTRAVENOUS
Qty: 200 | Refills: 0 | Status: DISCONTINUED | OUTPATIENT
Start: 2021-09-28 | End: 2021-09-30

## 2021-09-28 RX ADMIN — Medication 1 PUFF(S): at 07:23

## 2021-09-28 RX ADMIN — Medication 1 MILLIGRAM(S): at 23:49

## 2021-09-28 RX ADMIN — PANTOPRAZOLE SODIUM 40 MILLIGRAM(S): 20 TABLET, DELAYED RELEASE ORAL at 11:06

## 2021-09-28 RX ADMIN — Medication 1 MILLIGRAM(S): at 09:11

## 2021-09-28 RX ADMIN — Medication 1 PUFF(S): at 13:48

## 2021-09-28 RX ADMIN — Medication 1 GRAM(S): at 17:06

## 2021-09-28 RX ADMIN — FENTANYL CITRATE 1 PATCH: 50 INJECTION INTRAVENOUS at 07:34

## 2021-09-28 RX ADMIN — ALBUTEROL 1 PUFF(S): 90 AEROSOL, METERED ORAL at 19:49

## 2021-09-28 RX ADMIN — HEPARIN SODIUM 5000 UNIT(S): 5000 INJECTION INTRAVENOUS; SUBCUTANEOUS at 13:02

## 2021-09-28 RX ADMIN — FENTANYL CITRATE 1 PATCH: 50 INJECTION INTRAVENOUS at 23:50

## 2021-09-28 RX ADMIN — Medication 1 GRAM(S): at 23:50

## 2021-09-28 RX ADMIN — Medication 4: at 11:25

## 2021-09-28 RX ADMIN — Medication 1 PUFF(S): at 19:49

## 2021-09-28 RX ADMIN — PIPERACILLIN AND TAZOBACTAM 25 GRAM(S): 4; .5 INJECTION, POWDER, LYOPHILIZED, FOR SOLUTION INTRAVENOUS at 13:02

## 2021-09-28 RX ADMIN — HEPARIN SODIUM 5000 UNIT(S): 5000 INJECTION INTRAVENOUS; SUBCUTANEOUS at 05:56

## 2021-09-28 RX ADMIN — Medication 1 GRAM(S): at 05:56

## 2021-09-28 RX ADMIN — ALBUTEROL 1 PUFF(S): 90 AEROSOL, METERED ORAL at 07:21

## 2021-09-28 RX ADMIN — METHOCARBAMOL 250 MILLIGRAM(S): 500 TABLET, FILM COATED ORAL at 17:06

## 2021-09-28 RX ADMIN — FENTANYL CITRATE 1 PATCH: 50 INJECTION INTRAVENOUS at 19:20

## 2021-09-28 RX ADMIN — ALBUTEROL 1 PUFF(S): 90 AEROSOL, METERED ORAL at 13:48

## 2021-09-28 RX ADMIN — ALBUTEROL 1 PUFF(S): 90 AEROSOL, METERED ORAL at 02:07

## 2021-09-28 RX ADMIN — Medication 1 GRAM(S): at 11:06

## 2021-09-28 RX ADMIN — HEPARIN SODIUM 5000 UNIT(S): 5000 INJECTION INTRAVENOUS; SUBCUTANEOUS at 21:16

## 2021-09-28 RX ADMIN — DEXMEDETOMIDINE HYDROCHLORIDE IN 0.9% SODIUM CHLORIDE 3.06 MICROGRAM(S)/KG/HR: 4 INJECTION INTRAVENOUS at 10:46

## 2021-09-28 RX ADMIN — CHLORHEXIDINE GLUCONATE 15 MILLILITER(S): 213 SOLUTION TOPICAL at 17:06

## 2021-09-28 RX ADMIN — Medication 1 TABLET(S): at 11:06

## 2021-09-28 RX ADMIN — Medication 1 MILLIGRAM(S): at 05:56

## 2021-09-28 RX ADMIN — Medication 2: at 23:52

## 2021-09-28 RX ADMIN — Medication 1 MILLIGRAM(S): at 17:06

## 2021-09-28 RX ADMIN — METHOCARBAMOL 250 MILLIGRAM(S): 500 TABLET, FILM COATED ORAL at 05:56

## 2021-09-28 RX ADMIN — INSULIN GLARGINE 36 UNIT(S): 100 INJECTION, SOLUTION SUBCUTANEOUS at 23:50

## 2021-09-28 RX ADMIN — FENTANYL CITRATE 1 PATCH: 50 INJECTION INTRAVENOUS at 23:49

## 2021-09-28 RX ADMIN — Medication 10 MILLIGRAM(S): at 21:18

## 2021-09-28 RX ADMIN — Medication 1 MILLIGRAM(S): at 11:12

## 2021-09-28 RX ADMIN — PIPERACILLIN AND TAZOBACTAM 25 GRAM(S): 4; .5 INJECTION, POWDER, LYOPHILIZED, FOR SOLUTION INTRAVENOUS at 05:57

## 2021-09-28 RX ADMIN — PIPERACILLIN AND TAZOBACTAM 25 GRAM(S): 4; .5 INJECTION, POWDER, LYOPHILIZED, FOR SOLUTION INTRAVENOUS at 21:16

## 2021-09-28 RX ADMIN — Medication 1 PUFF(S): at 02:06

## 2021-09-28 RX ADMIN — Medication 81 MILLIGRAM(S): at 11:06

## 2021-09-28 RX ADMIN — CHLORHEXIDINE GLUCONATE 15 MILLILITER(S): 213 SOLUTION TOPICAL at 05:57

## 2021-09-28 NOTE — PROGRESS NOTE ADULT - SUBJECTIVE AND OBJECTIVE BOX
Patient is a 75y Female whom presented to the hospital with ckd and manasa     PAST MEDICAL & SURGICAL HISTORY:  Dementia of frontal lobe type    Aphasic stroke    Diabetes mellitus    Respiratory failure    Hypertension    GERD (gastroesophageal reflux disease)    Constipation    Respiratory failure    CVA (cerebral vascular accident)    HTN (hypertension)    DM (diabetes mellitus)    Advanced dementia    COVID-19 virus detected    Quadriplegia    Pneumonia    Type II diabetes mellitus    Hx of appendectomy    Gastrostomy in place    Tracheostomy in place    Tracheostomy tube present    Feeding by G-tube        MEDICATIONS  (STANDING):  aspirin  chewable 81 milliGRAM(s) Oral daily  chlorhexidine 0.12% Liquid 15 milliLiter(s) Oral Mucosa every 12 hours  chlorhexidine 4% Liquid 1 Application(s) Topical <User Schedule>  dextrose 40% Gel 15 Gram(s) Oral once  dextrose 5%. 1000 milliLiter(s) (50 mL/Hr) IV Continuous <Continuous>  dextrose 5%. 1000 milliLiter(s) (100 mL/Hr) IV Continuous <Continuous>  dextrose 50% Injectable 25 Gram(s) IV Push once  dextrose 50% Injectable 12.5 Gram(s) IV Push once  dextrose 50% Injectable 25 Gram(s) IV Push once  glucagon  Injectable 1 milliGRAM(s) IntraMuscular once  heparin   Injectable 5000 Unit(s) SubCutaneous every 8 hours  insulin lispro (ADMELOG) corrective regimen sliding scale   SubCutaneous every 6 hours  ipratropium 17 MICROgram(s) HFA Inhaler 1 Puff(s) Inhalation every 6 hours  lactated ringers. 1000 milliLiter(s) (80 mL/Hr) IV Continuous <Continuous>  lactobacillus acidophilus 1 Tablet(s) Oral daily  norepinephrine Infusion 0.05 MICROgram(s)/kG/Min (5.74 mL/Hr) IV Continuous <Continuous>  pantoprazole  Injectable 40 milliGRAM(s) IV Push daily  piperacillin/tazobactam IVPB.. 3.375 Gram(s) IV Intermittent every 8 hours  senna 1 Tablet(s) Oral daily      Allergies    codeine (Hives)    Intolerances        SOCIAL HISTORY:  Denies ETOh,Smoking,     FAMILY HISTORY:  No pertinent family history in first degree relatives        REVIEW OF SYSTEMS:  unable to obtained a good review system                                                   8.4    7.82  )-----------( 286      ( 27 Sep 2021 12:08 )             28.3       CBC Full  -  ( 27 Sep 2021 12:08 )  WBC Count : 7.82 K/uL  RBC Count : 3.03 M/uL  Hemoglobin : 8.4 g/dL  Hematocrit : 28.3 %  Platelet Count - Automated : 286 K/uL  Mean Cell Volume : 93.4 fl  Mean Cell Hemoglobin : 27.7 pg  Mean Cell Hemoglobin Concentration : 29.7 gm/dL  Auto Neutrophil # : x  Auto Lymphocyte # : x  Auto Monocyte # : x  Auto Eosinophil # : x  Auto Basophil # : x  Auto Neutrophil % : x  Auto Lymphocyte % : x  Auto Monocyte % : x  Auto Eosinophil % : x  Auto Basophil % : x      09-27    140  |  111<H>  |  23  ----------------------------<  187<H>  4.5   |  23  |  0.93    Ca    8.6      27 Sep 2021 05:45  Phos  2.6     09-27  Mg     2.6     09-27    TPro  7.2  /  Alb  2.0<L>  /  TBili  0.4  /  DBili  x   /  AST  21  /  ALT  27  /  AlkPhos  117  09-27      CAPILLARY BLOOD GLUCOSE      POCT Blood Glucose.: 113 mg/dL (28 Sep 2021 17:15)  POCT Blood Glucose.: 250 mg/dL (28 Sep 2021 11:22)  POCT Blood Glucose.: 141 mg/dL (27 Sep 2021 23:02)      Vital Signs Last 24 Hrs  T(C): 36.9 (28 Sep 2021 16:25), Max: 37 (28 Sep 2021 04:00)  T(F): 98.4 (28 Sep 2021 16:25), Max: 98.6 (28 Sep 2021 04:00)  HR: 98 (28 Sep 2021 19:53) (66 - 108)  BP: 102/56 (28 Sep 2021 18:46) (97/53 - 137/58)  BP(mean): 72 (28 Sep 2021 18:46) (66 - 82)  RR: 16 (28 Sep 2021 18:46) (9 - 40)  SpO2: 98% (28 Sep 2021 19:53) (90% - 100%)        PT/INR - ( 27 Sep 2021 05:45 )   PT: 13.5 sec;   INR: 1.12 ratio           PHYSICAL EXAM:    Constitutional: NAD  HEENT: conjunctive   clear   Neck:  No JVD  Respiratory: decrease bs b/l   Cardiovascular: S1 and S2  Gastrointestinal: BS+, soft, pos peg   Extremities: No peripheral edema

## 2021-09-28 NOTE — PROGRESS NOTE ADULT - SUBJECTIVE AND OBJECTIVE BOX
[facility] DIVISION of INFECTIOUS DISEASE  Arturo Olivas MD PhD, Haylee Umanzor MD, Andressa Brantley MD, Billy Valenzuela MD  and providing coverage with Alexa Canas MD and Eusebio Chairez MD  Providing Infectious Disease Consultations at [facility], [facility], [facility]'s    Office# [phone] to schedule follow up appointments  Answering Service for urgent calls or New Consults [phone]  Cell# to text for urgent issues Arturo Olivas [phone]     infectious diseases progress note:    BREA BECKHAM is a 75y y. o. Female patient    Patient remains nonverbal    Allergies    codeine (Hives)    Intolerances        ANTIBIOTICS/RELEVANT:  antimicrobials  piperacillin/tazobactam IVPB.. 3.375 Gram(s) IV Intermittent every 8 hours    immunologic:    OTHER:  ALBUTerol    90 MICROgram(s) HFA Inhaler 1 Puff(s) Inhalation every 6 hours PRN  aspirin  chewable 81 milliGRAM(s) Oral daily  bisacodyl Suppository 10 milliGRAM(s) Rectal at bedtime  chlorhexidine 0.12% Liquid 15 milliLiter(s) Oral Mucosa every 12 hours  dextrose 40% Gel 15 Gram(s) Oral once  dextrose 5%. 1000 milliLiter(s) IV Continuous <Continuous>  dextrose 5%. 1000 milliLiter(s) IV Continuous <Continuous>  dextrose 50% Injectable 25 Gram(s) IV Push once  dextrose 50% Injectable 12.5 Gram(s) IV Push once  dextrose 50% Injectable 25 Gram(s) IV Push once  fentaNYL   Patch  12 MICROgram(s)/Hr 1 Patch Transdermal every 72 hours  glucagon  Injectable 1 milliGRAM(s) IntraMuscular once  heparin   Injectable 5000 Unit(s) SubCutaneous every 8 hours  insulin glargine Injectable (LANTUS) 36 Unit(s) SubCutaneous at bedtime  insulin lispro (ADMELOG) corrective regimen sliding scale   SubCutaneous every 6 hours  ipratropium 17 MICROgram(s) HFA Inhaler 1 Puff(s) Inhalation every 6 hours  lactobacillus acidophilus 1 Tablet(s) Oral daily  LORazepam     Tablet 1 milliGRAM(s) Oral every 6 hours  LORazepam   Injectable 1 milliGRAM(s) IV Push every 6 hours PRN  methocarbamol 250 milliGRAM(s) Oral two times a day  pantoprazole  Injectable 40 milliGRAM(s) IV Push daily  polyethylene glycol 3350 17 Gram(s) Oral every 12 hours PRN  senna 1 Tablet(s) Oral daily  simethicone 80 milliGRAM(s) Chew two times a day PRN  sucralfate suspension 1 Gram(s) Enteral Tube four times a day      Objective:  Last 24-Vital Signs Last 24 Hrs  T(C): 37 (28 Sep 2021 04:00), Max: 37.1 (27 Sep 2021 12:56)  T(F): 98.6 (28 Sep 2021 04:00), Max: 98.8 (27 Sep 2021 12:56)  HR: 71 (28 Sep 2021 07:16) (66 - 109)  BP: 115/55 (28 Sep 2021 06:00) (91/64 - 206/134)  BP(mean): 74 (28 Sep 2021 06:00) (67 - 156)  RR: 38 (28 Sep 2021 06:00) (9 - 40)  SpO2: 98% (28 Sep 2021 07:16) (89% - 100%)    T(C): 37 (09-28-21 @ 04:00), Max: 37.1 (09-27-21 @ 08:22)  T(F): 98.6 (09-28-21 @ 04:00), Max: 98.8 (09-27-21 @ 12:56)  T(C): 37 (09-28-21 @ 04:00), Max: 38.1 (09-25-21 @ 14:29)  T(F): 98.6 (09-28-21 @ 04:00), Max: 100.6 (09-25-21 @ 14:29)  T(C): 37 (09-28-21 @ 04:00), Max: 38.1 (09-25-21 @ 14:29)  T(F): 98.6 (09-28-21 @ 04:00), Max: 100.6 (09-25-21 @ 14:29)    PHYSICAL EXAM:  Constitutional: Well-developed, well nourished  Eyes: PERRLA, EOMI  Ear/Nose/Throat: oropharynx normal	  Neck: no JVD, no lymphadenopathy, supple, intubated via trach  Respiratory: no accessory muscle use, lung fields bilaterally clear  Cardiovascular: distant  Gastrointestinal: soft, NT, no HSM, BS-normal  Extremities: no clubbing, no cyanosis, edema trace  Neuro: patient alert,comfortable    Mode: AC/ CMV (Assist Control/ Continuous Mandatory Ventilation), RR (machine): 18, TV (machine): 350, FiO2: 40, PEEP: 88, ITime: 1, MAP: 16, PIP: 35    LABS:                        8.4    7.82  )-----------( 286      ( 27 Sep 2021 12:08 )             28.3       WBC 7.82  09-27 @ 12:08  WBC 6.92  09-27 @ 05:45  WBC 9.62  09-26 @ 06:41  WBC 13.61  09-25 @ 15:01      09-27    140  |  111<H>  |  23  ----------------------------<  187<H>  4.5   |  23  |  0.93    Ca    8.6      27 Sep 2021 05:45  Phos  2.6     09-27  Mg     2.6     09-27    TPro  7.2  /  Alb  2.0<L>  /  TBili  0.4  /  DBili  x   /  AST  21  /  ALT  27  /  AlkPhos  117  09-27      Creatinine, Serum: 0.93 mg/dL (09-27-21 @ 05:45)  Creatinine, Serum: 1.00 mg/dL (09-26-21 @ 06:41)  Creatinine, Serum: 1.31 mg/dL (09-25-21 @ 15:02)    PT/INR - ( 27 Sep 2021 05:45 )   PT: 13.5 sec;   INR: 1.12 ratio      MICROBIOLOGY:    Culture - Sputum . (09.26.21 @ 12:50)    Gram Stain:   Few polymorphonuclear leukocytes per low power field  Rare Squamous epithelial cells per low power field  Few Gram Negative Rods per oil power field    Specimen Source: .Sputum Sputum    Culture Results:   Moderate Pseudomonas aeruginosa  Moderate Serratia marcescens  Moderate Stenotrophomonas maltophilia    MRSA/MSSA PCR (09.27.21 @ 13:45)    MRSA PCR Result.: NotDetec:     Staph Aureus PCR Result: NotDetec        RADIOLOGY & ADDITIONAL STUDIES:

## 2021-09-28 NOTE — PROGRESS NOTE ADULT - PROBLEM SELECTOR PLAN 4
possibly related to UTI and/or PNA, lactic acid on presentation was 5.8 mmol/L, responded well to IVF resuscitation, down to normal, maintain IVF, monitor I & O, in addition to the above management.    Continue on pip-tazo (started late 9/25) so anticipate 5 days with last full day 9/30 but may extend based on sputum micro, clinical response and risk benefit of extended abx.

## 2021-09-28 NOTE — PROGRESS NOTE ADULT - SUBJECTIVE AND OBJECTIVE BOX
Date/Time Patient Seen:  		  Referring MD:   Data Reviewed	       Patient is a 75y old  Female who presents with a chief complaint of Respiratory distress. (27 Sep 2021 21:08)      Subjective/HPI     PAST MEDICAL & SURGICAL HISTORY:  Dementia of frontal lobe type    Aphasic stroke    Diabetes mellitus    Respiratory failure    Hypertension    GERD (gastroesophageal reflux disease)    Constipation    Respiratory failure    CVA (cerebral vascular accident)    HTN (hypertension)    DM (diabetes mellitus)    Advanced dementia    COVID-19 virus detected    Quadriplegia    Pneumonia    Type II diabetes mellitus    Hx of appendectomy    Gastrostomy in place    Tracheostomy in place    Tracheostomy tube present    Feeding by G-tube          Medication list         MEDICATIONS  (STANDING):  aspirin  chewable 81 milliGRAM(s) Oral daily  bisacodyl Suppository 10 milliGRAM(s) Rectal at bedtime  chlorhexidine 0.12% Liquid 15 milliLiter(s) Oral Mucosa every 12 hours  dextrose 40% Gel 15 Gram(s) Oral once  dextrose 5%. 1000 milliLiter(s) (50 mL/Hr) IV Continuous <Continuous>  dextrose 5%. 1000 milliLiter(s) (100 mL/Hr) IV Continuous <Continuous>  dextrose 50% Injectable 25 Gram(s) IV Push once  dextrose 50% Injectable 12.5 Gram(s) IV Push once  dextrose 50% Injectable 25 Gram(s) IV Push once  fentaNYL   Patch  12 MICROgram(s)/Hr 1 Patch Transdermal every 72 hours  glucagon  Injectable 1 milliGRAM(s) IntraMuscular once  heparin   Injectable 5000 Unit(s) SubCutaneous every 8 hours  insulin glargine Injectable (LANTUS) 36 Unit(s) SubCutaneous at bedtime  insulin lispro (ADMELOG) corrective regimen sliding scale   SubCutaneous every 6 hours  ipratropium 17 MICROgram(s) HFA Inhaler 1 Puff(s) Inhalation every 6 hours  lactobacillus acidophilus 1 Tablet(s) Oral daily  LORazepam     Tablet 1 milliGRAM(s) Oral every 6 hours  methocarbamol 250 milliGRAM(s) Oral two times a day  pantoprazole  Injectable 40 milliGRAM(s) IV Push daily  piperacillin/tazobactam IVPB.. 3.375 Gram(s) IV Intermittent every 8 hours  senna 1 Tablet(s) Oral daily  sucralfate suspension 1 Gram(s) Enteral Tube four times a day    MEDICATIONS  (PRN):  ALBUTerol    90 MICROgram(s) HFA Inhaler 1 Puff(s) Inhalation every 6 hours PRN Shortness of Breath and/or Wheezing  LORazepam   Injectable 1 milliGRAM(s) IV Push every 6 hours PRN agtation and vent dyssynchrony  polyethylene glycol 3350 17 Gram(s) Oral every 12 hours PRN Constipation  simethicone 80 milliGRAM(s) Chew two times a day PRN Dyspepsia         Vitals log        ICU Vital Signs Last 24 Hrs  T(C): 37 (28 Sep 2021 04:00), Max: 37.1 (27 Sep 2021 08:22)  T(F): 98.6 (28 Sep 2021 04:00), Max: 98.8 (27 Sep 2021 12:56)  HR: 82 (28 Sep 2021 06:00) (66 - 109)  BP: 115/55 (28 Sep 2021 06:00) (91/64 - 206/134)  BP(mean): 74 (28 Sep 2021 06:00) (67 - 156)  ABP: --  ABP(mean): --  RR: 38 (28 Sep 2021 06:00) (9 - 40)  SpO2: 91% (28 Sep 2021 06:00) (89% - 100%)       Mode: AC/ CMV (Assist Control/ Continuous Mandatory Ventilation)  RR (machine): 18  TV (machine): 350  FiO2: 40  PEEP: 8  ITime: 1  MAP: 16  PIP: 38      Input and Output:  I&O's Detail    26 Sep 2021 07:01  -  27 Sep 2021 07:00  --------------------------------------------------------  IN:    Enteral Tube Flush: 350 mL    IV PiggyBack: 100 mL    Jevity 1.5: 360 mL    Lactated Ringers: 960 mL    Lactated Ringers: 1500 mL  Total IN: 3270 mL    OUT:    Voided (mL): 300 mL  Total OUT: 300 mL    Total NET: 2970 mL      27 Sep 2021 07:01  -  28 Sep 2021 06:47  --------------------------------------------------------  IN:    Enteral Tube Flush: 425 mL    IV PiggyBack: 100 mL    Jevity 1.5: 510 mL    Lactated Ringers: 500 mL  Total IN: 1535 mL    OUT:    Voided (mL): 800 mL  Total OUT: 800 mL    Total NET: 735 mL          Lab Data                        8.4    7.82  )-----------( 286      ( 27 Sep 2021 12:08 )             28.3     09-27    140  |  111<H>  |  23  ----------------------------<  187<H>  4.5   |  23  |  0.93    Ca    8.6      27 Sep 2021 05:45  Phos  2.6     09-27  Mg     2.6     09-27    TPro  7.2  /  Alb  2.0<L>  /  TBili  0.4  /  DBili  x   /  AST  21  /  ALT  27  /  AlkPhos  117  09-27    ABG - ( 26 Sep 2021 19:55 )  pH, Arterial: 7.43  pH, Blood: x     /  pCO2: 39    /  pO2: 76    / HCO3: 26    / Base Excess: 1.6   /  SaO2: 97.0                    Review of Systems	      Objective     Physical Examination    heart s1s2  lung dec BS  abd soft      Pertinent Lab findings & Imaging      Annalise:  NO   Adequate UO     I&O's Detail    26 Sep 2021 07:01  -  27 Sep 2021 07:00  --------------------------------------------------------  IN:    Enteral Tube Flush: 350 mL    IV PiggyBack: 100 mL    Jevity 1.5: 360 mL    Lactated Ringers: 960 mL    Lactated Ringers: 1500 mL  Total IN: 3270 mL    OUT:    Voided (mL): 300 mL  Total OUT: 300 mL    Total NET: 2970 mL      27 Sep 2021 07:01  -  28 Sep 2021 06:47  --------------------------------------------------------  IN:    Enteral Tube Flush: 425 mL    IV PiggyBack: 100 mL    Jevity 1.5: 510 mL    Lactated Ringers: 500 mL  Total IN: 1535 mL    OUT:    Voided (mL): 800 mL  Total OUT: 800 mL    Total NET: 735 mL               Discussed with:     Cultures:	        Radiology

## 2021-09-28 NOTE — PROGRESS NOTE ADULT - SUBJECTIVE AND OBJECTIVE BOX
[facility]EC[facility]    Allergies    codeine (Hives)    Intolerances        PAST MEDICAL & SURGICAL HISTORY:  Dementia of frontal lobe type    Aphasic stroke    Diabetes mellitus    Respiratory failure    Hypertension    GERD (gastroesophageal reflux disease)    Constipation    Respiratory failure    CVA (cerebral vascular accident)    HTN (hypertension)    DM (diabetes mellitus)    Advanced dementia    COVID-19 virus detected    Quadriplegia    Pneumonia    Type II diabetes mellitus    Hx of appendectomy    Gastrostomy in place    Tracheostomy in place    Tracheostomy tube present    Feeding by G-tube        FAMILY HISTORY:  No pertinent family history in first degree relatives        Home Medications:  acetaminophen 160 mg/5 mL oral suspension: 20.31 milliliter(s) by gastrostomy tube every 6 hours, As Needed (23 Jun 2021 09:08)  albuterol 90 mcg/inh inhalation aerosol: 2 puff(s) inhaled every 6 hours (23 Jun 2021 09:08)  aspirin 81 mg oral tablet, chewable: 1 tab(s) by PEG tube once a day (15 Zay 2021 15:07)  Bacid (LAC) oral tablet: 2 tab(s) by gastrostomy tube once a day (23 Jun 2021 09:08)  bisacodyl 5 mg oral delayed release tablet: 1 tab(s) orally once a day (at bedtime) (23 Jun 2021 09:08)  Carafate 1 g/10 mL oral suspension: 10 milliliter(s) by gastrostomy tube 4 times a day (before meals and at bedtime) for 14 days (Started 6/4/21) (15 Zay 2021 15:07)  chlorhexidine 0.12% mucous membrane liquid: 15 milliliter(s) mucous membrane 2 times a day (15 Sep 2021 12:08)  insulin glargine: 36 unit(s) subcutaneous once a day (at bedtime) (15 Sep 2021 12:08)  ipratropium-albuterol 0.5 mg-2.5 mg/3 mL inhalation solution: 3 milliliter(s) inhaled 4 times a day (15 Zay 2021 15:07)  LORazepam 0.5 mg oral tablet: 1 tab(s) orally every 2 hours, As needed, Agitation (15 Sep 2021 12:08)  LORazepam 1 mg oral tablet: 1 tab(s) orally every 6 hours (15 Sep 2021 12:08)  methocarbamol: 250 milligram(s) by gastrostomy tube 2 times a day (15 Sep 2021 12:08)  pantoprazole 40 mg oral granule, delayed release: 40 milligram(s) by gastrostomy tube 2 times a day (15 Sep 2021 12:08)  polyethylene glycol 3350 oral powder for reconstitution: 17 gram(s) orally every 12 hours (23 Jun 2021 09:08)  senna 8.6 mg oral tablet: 1 tab(s) by gastrostomy tube once a day (at bedtime) (23 Jun 2021 09:08)  simethicone 80 mg oral tablet, chewable: 1 tab(s) orally every 6 hours (15 Sep 2021 12:08)      MEDICATIONS  (STANDING):  aspirin  chewable 81 milliGRAM(s) Oral daily  bisacodyl Suppository 10 milliGRAM(s) Rectal at bedtime  chlorhexidine 0.12% Liquid 15 milliLiter(s) Oral Mucosa every 12 hours  dexMEDEtomidine Infusion 0.2 MICROgram(s)/kG/Hr (3.06 mL/Hr) IV Continuous <Continuous>  dextrose 40% Gel 15 Gram(s) Oral once  dextrose 5%. 1000 milliLiter(s) (50 mL/Hr) IV Continuous <Continuous>  dextrose 5%. 1000 milliLiter(s) (100 mL/Hr) IV Continuous <Continuous>  dextrose 50% Injectable 25 Gram(s) IV Push once  dextrose 50% Injectable 12.5 Gram(s) IV Push once  dextrose 50% Injectable 25 Gram(s) IV Push once  fentaNYL   Patch  12 MICROgram(s)/Hr 1 Patch Transdermal every 72 hours  glucagon  Injectable 1 milliGRAM(s) IntraMuscular once  heparin   Injectable 5000 Unit(s) SubCutaneous every 8 hours  insulin glargine Injectable (LANTUS) 36 Unit(s) SubCutaneous at bedtime  insulin lispro (ADMELOG) corrective regimen sliding scale   SubCutaneous every 6 hours  ipratropium 17 MICROgram(s) HFA Inhaler 1 Puff(s) Inhalation every 6 hours  lactobacillus acidophilus 1 Tablet(s) Oral daily  LORazepam     Tablet 1 milliGRAM(s) Oral every 6 hours  methocarbamol 250 milliGRAM(s) Oral two times a day  pantoprazole  Injectable 40 milliGRAM(s) IV Push daily  piperacillin/tazobactam IVPB.. 3.375 Gram(s) IV Intermittent every 8 hours  senna 1 Tablet(s) Oral daily  sucralfate suspension 1 Gram(s) Enteral Tube four times a day    MEDICATIONS  (PRN):  ALBUTerol    90 MICROgram(s) HFA Inhaler 1 Puff(s) Inhalation every 6 hours PRN Shortness of Breath and/or Wheezing  LORazepam   Injectable 1 milliGRAM(s) IV Push every 6 hours PRN agtation and vent dyssynchrony  polyethylene glycol 3350 17 Gram(s) Oral every 12 hours PRN Constipation  simethicone 80 milliGRAM(s) Chew two times a day PRN Dyspepsia      Diet, NPO with Tube Feed:   Tube Feeding Modality: Gastrostomy  Jevity 1.5 Horacio  Total Volume for 24 Hours (mL): 720  Continuous  Starting Tube Feed Rate mL per Hour: 30  Until Goal Tube Feed Rate (mL per Hour): 30  Tube Feed Duration (in Hours): 24  Tube Feed Start Time: 19:00 (09-26-21 @ 18:08) [Active]          Vital Signs Last 24 Hrs  T(C): 36.9 (28 Sep 2021 08:15), Max: 37.1 (27 Sep 2021 12:56)  T(F): 98.4 (28 Sep 2021 08:15), Max: 98.8 (27 Sep 2021 12:56)  HR: 94 (28 Sep 2021 10:48) (66 - 109)  BP: 105/59 (28 Sep 2021 10:48) (91/64 - 206/134)  BP(mean): 71 (28 Sep 2021 10:48) (67 - 156)  RR: 31 (28 Sep 2021 10:48) (9 - 40)  SpO2: 93% (28 Sep 2021 10:48) (89% - 100%)      09-27-21 @ 07:01  -  09-28-21 @ 07:00  --------------------------------------------------------  IN: 1755 mL / OUT: 800 mL / NET: 955 mL    09-28-21 @ 07:01  -  09-28-21 @ 11:06  --------------------------------------------------------  IN: 110 mL / OUT: 0 mL / NET: 110 mL        Mode: PRVC, RR (machine): 18, TV (machine): 350, FiO2: 40, PEEP: 8, ITime: 1, MAP: 19, PIP: 28      LABS:                        8.4    7.82  )-----------( 286      ( 27 Sep 2021 12:08 )             28.3     09-27    140  |  111<H>  |  23  ----------------------------<  187<H>  4.5   |  23  |  0.93    Ca    8.6      27 Sep 2021 05:45  Phos  2.6     09-27  Mg     2.6     09-27    TPro  7.2  /  Alb  2.0<L>  /  TBili  0.4  /  DBili  x   /  AST  21  /  ALT  27  /  AlkPhos  117  09-27    PT/INR - ( 27 Sep 2021 05:45 )   PT: 13.5 sec;   INR: 1.12 ratio               ABG - ( 26 Sep 2021 19:55 )  pH, Arterial: 7.43  pH, Blood: x     /  pCO2: 39    /  pO2: 76    / HCO3: 26    / Base Excess: 1.6   /  SaO2: 97.0                WBC:  WBC Count: 7.82 K/uL (09-27 @ 12:08)  WBC Count: 6.92 K/uL (09-27 @ 05:45)  WBC Count: 9.62 K/uL (09-26 @ 06:41)  WBC Count: 13.61 K/uL (09-25 @ 15:01)      MICROBIOLOGY:  RECENT CULTURES:  09-26 .Sputum Sputum XXXX   Few polymorphonuclear leukocytes per low power field  Rare Squamous epithelial cells per low power field  Few Gram Negative Rods per oil power field   Moderate Pseudomonas aeruginosa  Moderate Serratia marcescens  Moderate Stenotrophomonas maltophilia    09-25 .Blood Blood-Peripheral XXXX XXXX   No growth to date.                PT/INR - ( 27 Sep 2021 05:45 )   PT: 13.5 sec;   INR: 1.12 ratio             Sodium:  Sodium, Serum: 140 mmol/L (09-27 @ 05:45)  Sodium, Serum: 142 mmol/L (09-26 @ 06:41)  Sodium, Serum: 137 mmol/L (09-25 @ 15:02)      0.93 mg/dL 09-27 @ 05:45  1.00 mg/dL 09-26 @ 06:41  1.31 mg/dL 09-25 @ 15:02      Hemoglobin:  Hemoglobin: 8.4 g/dL (09-27 @ 12:08)  Hemoglobin: 7.7 g/dL (09-27 @ 05:45)  Hemoglobin: 8.6 g/dL (09-26 @ 12:00)  Hemoglobin: 9.1 g/dL (09-26 @ 06:41)  Hemoglobin: 10.4 g/dL (09-25 @ 15:01)      Platelets: Platelet Count - Automated: 286 K/uL (09-27 @ 12:08)  Platelet Count - Automated: 342 K/uL (09-27 @ 05:45)  Platelet Count - Automated: 424 K/uL (09-26 @ 06:41)  Platelet Count - Automated: 435 K/uL (09-25 @ 15:01)      LIVER FUNCTIONS - ( 27 Sep 2021 05:45 )  Alb: 2.0 g/dL / Pro: 7.2 g/dL / ALK PHOS: 117 U/L / ALT: 27 U/L / AST: 21 U/L / GGT: x                 RADIOLOGY & ADDITIONAL STUDIES:      MICROBIOLOGY:  RECENT CULTURES:  09-26 .Sputum Sputum XXXX   Few polymorphonuclear leukocytes per low power field  Rare Squamous epithelial cells per low power field  Few Gram Negative Rods per oil power field   Moderate Pseudomonas aeruginosa  Moderate Serratia marcescens  Moderate Stenotrophomonas maltophilia    09-25 .Blood Blood-Peripheral XXXX XXXX   No growth to date.

## 2021-09-28 NOTE — PROGRESS NOTE ADULT - SUBJECTIVE AND OBJECTIVE BOX
Chief Complaint: Respiratory distress    Interval Events: No events overnight.    Review of Systems:  Unable to obtain    Physical Exam:  Vital Signs Last 24 Hrs  T(C): 37 (28 Sep 2021 04:00), Max: 37.1 (27 Sep 2021 12:56)  T(F): 98.6 (28 Sep 2021 04:00), Max: 98.8 (27 Sep 2021 12:56)  HR: 71 (28 Sep 2021 07:16) (66 - 109)  BP: 115/55 (28 Sep 2021 06:00) (91/64 - 206/134)  BP(mean): 74 (28 Sep 2021 06:00) (67 - 156)  RR: 38 (28 Sep 2021 06:00) (9 - 40)  SpO2: 98% (28 Sep 2021 07:16) (89% - 100%)  General: Chronically ill appearing  HEENT: Trach  Neck: No JVD, no carotid bruit  Lungs: Coarse bilaterally  CV: RRR, nl S1/S2, no M/R/G  Abdomen: S/NT/ND, +BS  Extremities: No LE edema, no cyanosis  Neuro: AAOx0  Skin: No rash    Labs:    09-27    140  |  111<H>  |  23  ----------------------------<  187<H>  4.5   |  23  |  0.93    Ca    8.6      27 Sep 2021 05:45  Phos  2.6     09-27  Mg     2.6     09-27    TPro  7.2  /  Alb  2.0<L>  /  TBili  0.4  /  DBili  x   /  AST  21  /  ALT  27  /  AlkPhos  117  09-27                        8.4    7.82  )-----------( 286      ( 27 Sep 2021 12:08 )             28.3       Telemetry: Sinus rhythm

## 2021-09-28 NOTE — PROVIDER CONTACT NOTE (EICU) - SITUATION
Patient currently w/ RR 30s, HR 100s, /81, O2 97%
Patient noted to be tachypneic and tachycardic
see above

## 2021-09-28 NOTE — PROVIDER CONTACT NOTE (EICU) - BACKGROUND
admitted fot resp distress/failure  chronic trach to vent
76 y/o female with PMHx of HTN, DM, prior CVA, dementia, chronic hypoxic respiratory failure s/p trach/peg and recent cardiac arrest admitted with resp distress and hypoxia, PNA
74 y/o female with PMHx of HTN, DM, prior CVA, dementia, chronic hypoxic respiratory failure s/p trach/peg and recent cardiac arrest admitted with resp distress and hypoxia

## 2021-09-28 NOTE — PROGRESS NOTE ADULT - ASSESSMENT
75 year old female with a history of HTN, DM, CVA, dementia, chronic respiratory failure s/p trach, PEG, recent cardiac arrest who presents with acute on chronic respiratory failure. The patient is unable to provide any history to me due to advanced dementia and trach. She was found to have respiratory distress at NH so she was sent to ED. Hypoxic in NH was titrated up to 100%. Pt had been agitated in ER was given 0.5 ativan in ER. Pt agitation resolved   Pt trach to vent at this time with new seemingly aspiration/multifacial PNA. Pt in Er was normotensive then recently began to drop BP, additional fluid bolus was given and will start on levophed to maintain MAP>65       ACUTE RENAL FAILURE: lactated ringers. 1000 milliLiter(s) (80 mL/Hr) IV   Serum creatinine is 0.93   There is no progression . No uremic symptoms  No evidence of anemia .  Fluid status stable.  Will continue to avoid nephrotoxic drugs.  Patient remains asymptomatic.   Continue current therapy.  hold  diuretic.  hold   ACE inhibitor.  hold   ARB.  Additional evaluation:   ECG,    echocardiogram,     CXR,  will obtained recent   renal ultrasound to evalaute kidney size and possible stones , if cr is not improving     Admit for septic workup and ID evaluation,send blood and urine cx,serial lactate levels,monitor vitals closley,amadeos hydration,monitor urine output and renal profile,iv abx as per id cons  piperacillin/tazobactam IVPB.. 3.375 Gram(s) IV Intermittent every 8 hours

## 2021-09-28 NOTE — PROVIDER CONTACT NOTE (EICU) - ASSESSMENT
RR 30s, HR 120s, SBP 130s.  Patient trached on vent at fio2 40%. Bedside team able to ambu patient without any difficulties. Just received Ativan 1mg IVP. Still tachypneic.
Received Ativan 1mg IVP a little while ago but still appears restless, uncomfortable
sweating, tacycardic

## 2021-09-28 NOTE — PROGRESS NOTE ADULT - SUBJECTIVE AND OBJECTIVE BOX
Patient is a 75y old  Female who presents with a chief complaint of Respiratory distress. (28 Sep 2021 11:45)      INTERVAL HPI/OVERNIGHT EVENTS:    pt was restless again in the AM.  Ativan and precedex was added to calm patient.  Seen by neuro.  Doesn't think its seizures.     MEDICATIONS  (STANDING):  aspirin  chewable 81 milliGRAM(s) Oral daily  bisacodyl Suppository 10 milliGRAM(s) Rectal at bedtime  chlorhexidine 0.12% Liquid 15 milliLiter(s) Oral Mucosa every 12 hours  dexMEDEtomidine Infusion 0.2 MICROgram(s)/kG/Hr (3.06 mL/Hr) IV Continuous <Continuous>  dextrose 40% Gel 15 Gram(s) Oral once  dextrose 5%. 1000 milliLiter(s) (100 mL/Hr) IV Continuous <Continuous>  dextrose 5%. 1000 milliLiter(s) (50 mL/Hr) IV Continuous <Continuous>  dextrose 50% Injectable 25 Gram(s) IV Push once  dextrose 50% Injectable 12.5 Gram(s) IV Push once  dextrose 50% Injectable 25 Gram(s) IV Push once  fentaNYL   Patch  12 MICROgram(s)/Hr 1 Patch Transdermal every 72 hours  glucagon  Injectable 1 milliGRAM(s) IntraMuscular once  heparin   Injectable 5000 Unit(s) SubCutaneous every 8 hours  insulin glargine Injectable (LANTUS) 36 Unit(s) SubCutaneous at bedtime  insulin lispro (ADMELOG) corrective regimen sliding scale   SubCutaneous every 6 hours  ipratropium 17 MICROgram(s) HFA Inhaler 1 Puff(s) Inhalation every 6 hours  lactobacillus acidophilus 1 Tablet(s) Oral daily  LORazepam     Tablet 1 milliGRAM(s) Oral every 6 hours  methocarbamol 250 milliGRAM(s) Oral two times a day  pantoprazole  Injectable 40 milliGRAM(s) IV Push daily  piperacillin/tazobactam IVPB.. 3.375 Gram(s) IV Intermittent every 8 hours  senna 1 Tablet(s) Oral daily  sucralfate suspension 1 Gram(s) Enteral Tube four times a day    MEDICATIONS  (PRN):  ALBUTerol    90 MICROgram(s) HFA Inhaler 1 Puff(s) Inhalation every 6 hours PRN Shortness of Breath and/or Wheezing  LORazepam   Injectable 1 milliGRAM(s) IV Push every 6 hours PRN agtation and vent dyssynchrony  polyethylene glycol 3350 17 Gram(s) Oral every 12 hours PRN Constipation  simethicone 80 milliGRAM(s) Chew two times a day PRN Dyspepsia      Allergies    codeine (Hives)    Intolerances        Vital Signs Last 24 Hrs  T(C): 36.9 (28 Sep 2021 16:25), Max: 37 (28 Sep 2021 04:00)  T(F): 98.4 (28 Sep 2021 16:25), Max: 98.6 (28 Sep 2021 04:00)  HR: 108 (28 Sep 2021 16:56) (66 - 108)  BP: 128/53 (28 Sep 2021 16:00) (98/52 - 137/58)  BP(mean): 75 (28 Sep 2021 16:00) (67 - 82)  RR: 32 (28 Sep 2021 16:00) (9 - 40)  SpO2: 96% (28 Sep 2021 16:56) (90% - 100%)    PHYSICAL EXAM:  GENERAL: NAD  HEAD:  Atraumatic, Normocephalic  EYES: EOMI, PERRLA, conjunctiva and sclera clear  ENMT: Moist mucous membranes, No lesions; No tonsillar erythema, exudates, or enlargement  NECK: Supple, No JVD, Normal thyroid  NERVOUS SYSTEM:  Alert & Oriented X3, Good concentration; All 4 extremities mobile, no gross sensory deficits.   CHEST/LUNG: Clear to auscultation bilaterally; No rales, rhonchi, wheezing, or rubs  HEART: Regular rate and rhythm; No murmurs, rubs, or gallops  ABDOMEN: Soft, Nontender, Nondistended; Bowel sounds present  EXTREMITIES:  2+ Peripheral Pulses, No clubbing, cyanosis, or edema  LYMPH: No lymphadenopathy noted  SKIN: No rashes or lesions    LABS:      Ca    8.6        27 Sep 2021 05:45      PT/INR - ( 27 Sep 2021 05:45 )   PT: 13.5 sec;   INR: 1.12 ratio             CAPILLARY BLOOD GLUCOSE      POCT Blood Glucose.: 250 mg/dL (28 Sep 2021 11:22)  POCT Blood Glucose.: 141 mg/dL (27 Sep 2021 23:02)  POCT Blood Glucose.: 148 mg/dL (27 Sep 2021 17:50)      RADIOLOGY & ADDITIONAL TESTS:    Imaging Personally Reviewed:  [ ] YES     Consultant(s) Notes Reviewed:      Care Discussed with Consultants/Other Providers:    Advanced Directives: [ ] DNR  [ ] No feeding tube  [ ] MOLST in chart  [ ] MOLST completed today  [ ] Unknown   Patient is a 75y old  Female who presents with a chief complaint of Respiratory distress. (28 Sep 2021 11:45)      INTERVAL HPI/OVERNIGHT EVENTS:    pt was restless again in the AM.  Ativan and precedex was added to calm patient.  Seen by neuro.  Doesn't think its seizures.     MEDICATIONS  (STANDING):  aspirin  chewable 81 milliGRAM(s) Oral daily  bisacodyl Suppository 10 milliGRAM(s) Rectal at bedtime  chlorhexidine 0.12% Liquid 15 milliLiter(s) Oral Mucosa every 12 hours  dexMEDEtomidine Infusion 0.2 MICROgram(s)/kG/Hr (3.06 mL/Hr) IV Continuous <Continuous>  dextrose 40% Gel 15 Gram(s) Oral once  dextrose 5%. 1000 milliLiter(s) (100 mL/Hr) IV Continuous <Continuous>  dextrose 5%. 1000 milliLiter(s) (50 mL/Hr) IV Continuous <Continuous>  dextrose 50% Injectable 25 Gram(s) IV Push once  dextrose 50% Injectable 12.5 Gram(s) IV Push once  dextrose 50% Injectable 25 Gram(s) IV Push once  fentaNYL   Patch  12 MICROgram(s)/Hr 1 Patch Transdermal every 72 hours  glucagon  Injectable 1 milliGRAM(s) IntraMuscular once  heparin   Injectable 5000 Unit(s) SubCutaneous every 8 hours  insulin glargine Injectable (LANTUS) 36 Unit(s) SubCutaneous at bedtime  insulin lispro (ADMELOG) corrective regimen sliding scale   SubCutaneous every 6 hours  ipratropium 17 MICROgram(s) HFA Inhaler 1 Puff(s) Inhalation every 6 hours  lactobacillus acidophilus 1 Tablet(s) Oral daily  LORazepam     Tablet 1 milliGRAM(s) Oral every 6 hours  methocarbamol 250 milliGRAM(s) Oral two times a day  pantoprazole  Injectable 40 milliGRAM(s) IV Push daily  piperacillin/tazobactam IVPB.. 3.375 Gram(s) IV Intermittent every 8 hours  senna 1 Tablet(s) Oral daily  sucralfate suspension 1 Gram(s) Enteral Tube four times a day    MEDICATIONS  (PRN):  ALBUTerol    90 MICROgram(s) HFA Inhaler 1 Puff(s) Inhalation every 6 hours PRN Shortness of Breath and/or Wheezing  LORazepam   Injectable 1 milliGRAM(s) IV Push every 6 hours PRN agtation and vent dyssynchrony  polyethylene glycol 3350 17 Gram(s) Oral every 12 hours PRN Constipation  simethicone 80 milliGRAM(s) Chew two times a day PRN Dyspepsia      Allergies    codeine (Hives)    Intolerances        Vital Signs Last 24 Hrs  T(C): 36.9 (28 Sep 2021 16:25), Max: 37 (28 Sep 2021 04:00)  T(F): 98.4 (28 Sep 2021 16:25), Max: 98.6 (28 Sep 2021 04:00)  HR: 108 (28 Sep 2021 16:56) (66 - 108)  BP: 128/53 (28 Sep 2021 16:00) (98/52 - 137/58)  BP(mean): 75 (28 Sep 2021 16:00) (67 - 82)  RR: 32 (28 Sep 2021 16:00) (9 - 40)  SpO2: 96% (28 Sep 2021 16:56) (90% - 100%)    PHYSICAL EXAM:  GENERAL: NAD, well-developed, not currently in any distress.  HEAD:  Atraumatic, Norm cephalic.  EYES: PERRLA, right lateral gaze B/L, conjunctiva clear.  ENMT: no nasal discharge, MMM, unremarkable tracheostomy tube.   NECK: Supple, No JVD.  NERVOUS SYSTEM:  Awake with open eyes but nonverbal, quadriplegic, supine with contracture position, forearms crossed over the chest  CHEST/LUNG: Fair air entry B/L, coarse rales B/L middle & lower lung zones, left > right, no rhonchi, or wheezing.  HEART: Normal S1 & S2, no murmurs, or extra sounds.  ABDOMEN: Soft, non-distended; bowel sounds present, no palpable masses or organomegaly, unremarkable PEG in place.  EXTREMITIES:  No clubbing, cyanosis, or edema, diffuse muscle wasting.  VASCULAR: 2+ radial, DPA / PTA pulses B/L.  SKIN: No rashes, (+) right bid toe tip ulcer, (+) gangrenous left toe tip with eschar.  PSYCH: nor agitation currently (received Ativan IVP earlier at the ED).    LABS:      Ca    8.6        27 Sep 2021 05:45      PT/INR - ( 27 Sep 2021 05:45 )   PT: 13.5 sec;   INR: 1.12 ratio             CAPILLARY BLOOD GLUCOSE      POCT Blood Glucose.: 250 mg/dL (28 Sep 2021 11:22)  POCT Blood Glucose.: 141 mg/dL (27 Sep 2021 23:02)  POCT Blood Glucose.: 148 mg/dL (27 Sep 2021 17:50)      RADIOLOGY & ADDITIONAL TESTS:    Imaging Personally Reviewed:  [ ] YES     Consultant(s) Notes Reviewed:      Care Discussed with Consultants/Other Providers:    Advanced Directives: [ ] DNR  [ ] No feeding tube  [ ] MOLST in chart  [ ] MOLST completed today  [ ] Unknown

## 2021-09-28 NOTE — PROVIDER CONTACT NOTE (EICU) - REASON
restlessness, tachycardia, sweating, low 02 saturation
Bedside team e-alerted for tachypnea, tachycardia
Patient tachypneic, tachycardic

## 2021-09-28 NOTE — PROVIDER CONTACT NOTE (EICU) - ACTION/TREATMENT ORDERED:
CXR, Fentanyl 50mcg IVP x 1 ordered. Primary team to follow up results.
Precedex gtt added.  Bedside team in aggreement.
vent settings change from AC to PRVC settings  Precedex order made when the pt continue remain the same

## 2021-09-28 NOTE — PROGRESS NOTE ADULT - ASSESSMENT
76 y/o female with a PMHx of DM2, pna, quadriplegia, advanced dementia, DM, CVA, GERD, HTN presents to the ED c/o recent admitted to [facility] a 9/7-9/15 after being found unresponsive at nursing home, concerned about post operative distress, hypotensive signs of aspirational pna, with volume dissipated, treated with levosa, zosyn. Admitted to ICU for aspirational pna. Urinary culture proteus, which was also sensitive to Zosyn. Also appears to have paronychia of toe which she had Zyvox during hospital stay. She was found to be anemic so she was transfused of 1 L RBC, no signs of active bleeding and pt stabilized. Pt was stabilized and d/c back to rehab. Patient reported for reported respiratory distress. Pt found to be on normal vent settings, does have increased  respiratory rate. No further hx can be obtained at this time as pt is nonverbal.  sepsis - shock - chr resp failure - anemia - acute infection - pna - dysphagia - anoxia - dementia     on ABX  HOB elev  oral hygiene  skin care  assist with ADL  full code - spoke with  - HCP  cx in progress  ABG noted  vent support  monitor VS and HD and Sat  CCM notes reviewed  vent support in progress - not a candidate for weaning  old records reviewed

## 2021-09-28 NOTE — PROGRESS NOTE ADULT - PROBLEM SELECTOR PLAN 1
recurrent, versus vent associated PNA, start on aspiration precautions, patient was started on Zosyn & Vancomycin, continue, trend TLC and monitor temp, f/u culture results, ID consult with Dr. Velez was called.    nicole KS'ed.    ID recs appreciated  Continue on pip-tazo (started late 9/25) so anticipate 5 days with last full day 9/30 but may extend based on sputum micro, clinical response and risk benefit of extended abx.

## 2021-09-28 NOTE — PROGRESS NOTE ADULT - SUBJECTIVE AND OBJECTIVE BOX
Neurology follow up note    BREA [id number]      Interval History:    Patient resting in bed     Allergies    codeine (Hives)    Intolerances        MEDICATIONS    ALBUTerol    90 MICROgram(s) HFA Inhaler 1 Puff(s) Inhalation every 6 hours PRN  aspirin  chewable 81 milliGRAM(s) Oral daily  bisacodyl Suppository 10 milliGRAM(s) Rectal at bedtime  chlorhexidine 0.12% Liquid 15 milliLiter(s) Oral Mucosa every 12 hours  dexMEDEtomidine Infusion 0.2 MICROgram(s)/kG/Hr IV Continuous <Continuous>  dextrose 40% Gel 15 Gram(s) Oral once  dextrose 5%. 1000 milliLiter(s) IV Continuous <Continuous>  dextrose 5%. 1000 milliLiter(s) IV Continuous <Continuous>  dextrose 50% Injectable 25 Gram(s) IV Push once  dextrose 50% Injectable 12.5 Gram(s) IV Push once  dextrose 50% Injectable 25 Gram(s) IV Push once  fentaNYL   Patch  12 MICROgram(s)/Hr 1 Patch Transdermal every 72 hours  glucagon  Injectable 1 milliGRAM(s) IntraMuscular once  heparin   Injectable 5000 Unit(s) SubCutaneous every 8 hours  insulin glargine Injectable (LANTUS) 36 Unit(s) SubCutaneous at bedtime  insulin lispro (ADMELOG) corrective regimen sliding scale   SubCutaneous every 6 hours  ipratropium 17 MICROgram(s) HFA Inhaler 1 Puff(s) Inhalation every 6 hours  lactobacillus acidophilus 1 Tablet(s) Oral daily  LORazepam     Tablet 1 milliGRAM(s) Oral every 6 hours  LORazepam   Injectable 1 milliGRAM(s) IV Push every 6 hours PRN  methocarbamol 250 milliGRAM(s) Oral two times a day  pantoprazole  Injectable 40 milliGRAM(s) IV Push daily  piperacillin/tazobactam IVPB.. 3.375 Gram(s) IV Intermittent every 8 hours  polyethylene glycol 3350 17 Gram(s) Oral every 12 hours PRN  senna 1 Tablet(s) Oral daily  simethicone 80 milliGRAM(s) Chew two times a day PRN  sucralfate suspension 1 Gram(s) Enteral Tube four times a day              Vital Signs Last 24 Hrs  T(C): 36.9 (28 Sep 2021 08:15), Max: 37.1 (27 Sep 2021 12:56)  T(F): 98.4 (28 Sep 2021 08:15), Max: 98.8 (27 Sep 2021 12:56)  HR: 94 (28 Sep 2021 10:48) (66 - 109)  BP: 105/59 (28 Sep 2021 10:48) (91/64 - 206/134)  BP(mean): 71 (28 Sep 2021 10:48) (67 - 156)  RR: 31 (28 Sep 2021 10:48) (9 - 40)  SpO2: 93% (28 Sep 2021 10:48) (89% - 100%)      REVIEW OF SYSTEMS:  Could not be obtained secondary to the patient being nonverbal.  As per my conversation with the spouse, a gradual process along with underlying strokes causing her to become bed-bound.    PHYSICAL EXAMINATION:    Head:  Normocephalic, atraumatic.  Eyes:  No scleral icterus.  Ears:  Cannot be evaluated secondary to the patient being nonverbal.  NECK:  Tracheostomy was in place.  RESPIRATORY:  Good air entry bilaterally.  CARDIOVASCULAR:  S1 and S2 heard.  ABDOMEN:  Soft and nontender.  EXTREMITIES:  No clubbing or cyanosis were noted.      NEUROLOGIC:  The patient was awake in bed.  At present no movement.  does have a history upon stimulating the patient, her eyes did roll up.  Her body started to stiffen with abnormal mouth movements, lasted one to two minutes history of this  Pupils bilaterally were 3 mm, reactive to 2 mm.  The patient has her arms in a flexed position with both hands contracted.  Bilateral lower extremities were in a straight position.  The patient has increased tone, bilateral upper and lower extremities.                   LABS:  CBC Full  -  ( 27 Sep 2021 12:08 )  WBC Count : 7.82 K/uL  RBC Count : 3.03 M/uL  Hemoglobin : 8.4 g/dL  Hematocrit : 28.3 %  Platelet Count - Automated : 286 K/uL  Mean Cell Volume : 93.4 fl  Mean Cell Hemoglobin : 27.7 pg  Mean Cell Hemoglobin Concentration : 29.7 gm/dL  Auto Neutrophil # : x  Auto Lymphocyte # : x  Auto Monocyte # : x  Auto Eosinophil # : x  Auto Basophil # : x  Auto Neutrophil % : x  Auto Lymphocyte % : x  Auto Monocyte % : x  Auto Eosinophil % : x  Auto Basophil % : x      09-27    140  |  111<H>  |  23  ----------------------------<  187<H>  4.5   |  23  |  0.93    Ca    8.6      27 Sep 2021 05:45  Phos  2.6     09-27  Mg     2.6     09-27    TPro  7.2  /  Alb  2.0<L>  /  TBili  0.4  /  DBili  x   /  AST  21  /  ALT  27  /  AlkPhos  117  09-27    Hemoglobin A1C:     LIVER FUNCTIONS - ( 27 Sep 2021 05:45 )  Alb: 2.0 g/dL / Pro: 7.2 g/dL / ALK PHOS: 117 U/L / ALT: 27 U/L / AST: 21 U/L / GGT: x           Vitamin B12   PT/INR - ( 27 Sep 2021 05:45 )   PT: 13.5 sec;   INR: 1.12 ratio               RADIOLOGY        ANALYSIS AND PLAN:  This is a 75-year-old with an episode of cardiac arrest and abnormal movements.  For episodes of cardiac arrest, continue to monitor heart rate on telemetry.  If possible, please maintain a systolic blood pressure greater than 100, to help maintain cerebral perfusion.  For history of abnormal movements, as per my conversation with the spouse, this is a known condition that happens to her, as per him with GI-related issues.  She will start to hyperventilate, eyes will roll up, will have abnormal mouth movement, and tried to move from side-to-side.  She has undergone extensive EEG monitoring and has captured these events on numerous occasions and deemed to be non-epileptiform.  For now, I would recommend supportive therapy.  No antiepileptic medications.  For history of diabetes, strict control of blood sugars.  For history of cerebrovascular accident, continue the patient on her home medications.  smuscle relaxant     The spouse's name is Marciano.  His telephone number is [phone]     Greater than 40 minutes of time was spent with the patient, plan of care, reviewing data, speaking to the multidisciplinary healthcare team with greater than 50% of that time in counseling and care coordination.   Neurology follow up note    BREA [id number]      Interval History:    Patient resting in bed     Allergies    codeine (Hives)    Intolerances        MEDICATIONS    ALBUTerol    90 MICROgram(s) HFA Inhaler 1 Puff(s) Inhalation every 6 hours PRN  aspirin  chewable 81 milliGRAM(s) Oral daily  bisacodyl Suppository 10 milliGRAM(s) Rectal at bedtime  chlorhexidine 0.12% Liquid 15 milliLiter(s) Oral Mucosa every 12 hours  dexMEDEtomidine Infusion 0.2 MICROgram(s)/kG/Hr IV Continuous <Continuous>  dextrose 40% Gel 15 Gram(s) Oral once  dextrose 5%. 1000 milliLiter(s) IV Continuous <Continuous>  dextrose 5%. 1000 milliLiter(s) IV Continuous <Continuous>  dextrose 50% Injectable 25 Gram(s) IV Push once  dextrose 50% Injectable 12.5 Gram(s) IV Push once  dextrose 50% Injectable 25 Gram(s) IV Push once  fentaNYL   Patch  12 MICROgram(s)/Hr 1 Patch Transdermal every 72 hours  glucagon  Injectable 1 milliGRAM(s) IntraMuscular once  heparin   Injectable 5000 Unit(s) SubCutaneous every 8 hours  insulin glargine Injectable (LANTUS) 36 Unit(s) SubCutaneous at bedtime  insulin lispro (ADMELOG) corrective regimen sliding scale   SubCutaneous every 6 hours  ipratropium 17 MICROgram(s) HFA Inhaler 1 Puff(s) Inhalation every 6 hours  lactobacillus acidophilus 1 Tablet(s) Oral daily  LORazepam     Tablet 1 milliGRAM(s) Oral every 6 hours  LORazepam   Injectable 1 milliGRAM(s) IV Push every 6 hours PRN  methocarbamol 250 milliGRAM(s) Oral two times a day  pantoprazole  Injectable 40 milliGRAM(s) IV Push daily  piperacillin/tazobactam IVPB.. 3.375 Gram(s) IV Intermittent every 8 hours  polyethylene glycol 3350 17 Gram(s) Oral every 12 hours PRN  senna 1 Tablet(s) Oral daily  simethicone 80 milliGRAM(s) Chew two times a day PRN  sucralfate suspension 1 Gram(s) Enteral Tube four times a day              Vital Signs Last 24 Hrs  T(C): 36.9 (28 Sep 2021 08:15), Max: 37.1 (27 Sep 2021 12:56)  T(F): 98.4 (28 Sep 2021 08:15), Max: 98.8 (27 Sep 2021 12:56)  HR: 94 (28 Sep 2021 10:48) (66 - 109)  BP: 105/59 (28 Sep 2021 10:48) (91/64 - 206/134)  BP(mean): 71 (28 Sep 2021 10:48) (67 - 156)  RR: 31 (28 Sep 2021 10:48) (9 - 40)  SpO2: 93% (28 Sep 2021 10:48) (89% - 100%)      REVIEW OF SYSTEMS:  Could not be obtained secondary to the patient being nonverbal.  As per my conversation with the spouse, a gradual process along with underlying strokes causing her to become bed-bound.    PHYSICAL EXAMINATION:    Head:  Normocephalic, atraumatic.  Eyes:  No scleral icterus.  Ears:  Cannot be evaluated secondary to the patient being nonverbal.  NECK:  Tracheostomy was in place.  RESPIRATORY:  Good air entry bilaterally.  CARDIOVASCULAR:  S1 and S2 heard.  ABDOMEN:  Soft and nontender.  EXTREMITIES:  No clubbing or cyanosis were noted.      NEUROLOGIC:  The patient was awake in bed.  At present no movement.  does have a history upon stimulating the patient, her eyes did roll up.  Her body started to stiffen with abnormal mouth movements, lasted one to two minutes history of this  Pupils bilaterally were 3 mm, reactive to 2 mm.  The patient has her arms in a flexed position with both hands contracted.  Bilateral lower extremities were in a straight position.  The patient has increased tone, bilateral upper and lower extremities.                   LABS:  CBC Full  -  ( 27 Sep 2021 12:08 )  WBC Count : 7.82 K/uL  RBC Count : 3.03 M/uL  Hemoglobin : 8.4 g/dL  Hematocrit : 28.3 %  Platelet Count - Automated : 286 K/uL  Mean Cell Volume : 93.4 fl  Mean Cell Hemoglobin : 27.7 pg  Mean Cell Hemoglobin Concentration : 29.7 gm/dL  Auto Neutrophil # : x  Auto Lymphocyte # : x  Auto Monocyte # : x  Auto Eosinophil # : x  Auto Basophil # : x  Auto Neutrophil % : x  Auto Lymphocyte % : x  Auto Monocyte % : x  Auto Eosinophil % : x  Auto Basophil % : x      09-27    140  |  111<H>  |  23  ----------------------------<  187<H>  4.5   |  23  |  0.93    Ca    8.6      27 Sep 2021 05:45  Phos  2.6     09-27  Mg     2.6     09-27    TPro  7.2  /  Alb  2.0<L>  /  TBili  0.4  /  DBili  x   /  AST  21  /  ALT  27  /  AlkPhos  117  09-27    Hemoglobin A1C:     LIVER FUNCTIONS - ( 27 Sep 2021 05:45 )  Alb: 2.0 g/dL / Pro: 7.2 g/dL / ALK PHOS: 117 U/L / ALT: 27 U/L / AST: 21 U/L / GGT: x           Vitamin B12   PT/INR - ( 27 Sep 2021 05:45 )   PT: 13.5 sec;   INR: 1.12 ratio               RADIOLOGY      ANALYSIS AND PLAN:  A 75-year-old with an episode of altered mental status, respiratory issues, and abnormal movements.  For episode of abnormal movements, as per my conversation with the spouse, in the past, these, as per him, are not epileptic in nature, does not want any type of antiseizure medications.  We will continue the patient on muscle relaxants.  For altered mental status, secondary to respiratory issues, metabolic encephalopathy.  For history of diabetes, strict control of blood sugars.  For history of TIA and strokes, continue the patient on current home medications.  The spouse's name is Marciano, his telephone number is [phone].      Greater than 40 minutes of time was spent with the patient, plan of care, reviewing data, speaking to the multidisciplinary healthcare team with greater than 50% of that time in counseling and care coordination.

## 2021-09-28 NOTE — PROVIDER CONTACT NOTE (EICU) - RECOMMENDATIONS
Fentanyl 50 mcg IVP x 1, CXR  After administration, HR in low  100s and RR in teens
continue to monitor, vent settings

## 2021-09-28 NOTE — PROGRESS NOTE ADULT - ASSESSMENT
VIRIDIANAAMI BREA 75 f [facility] S 9/24/2021   DR ILIANA KEMP     REVIEW OF SYMPTOMS      Able to give (reliable) ROS  NO     PHYSICAL EXAM    HEENT Unremarkable  atraumatic   RESP Fair air entry EXP prolonged    Harsh breath sound Resp distres mild   CARDIAC S1 S2 No S3     NO JVD    ABDOMEN SOFT BS PRESENT NOT DISTENDED No hepatosplenomegaly   PEDAL EDEMA present No calf tenderness  NO rash       PROBLEMS.        VAP poa 9/25  UTI poa 9/25  VDRF ([facility])  PEG ([facility])  ANOXIC ENCEPH ([facility])   WORSENING CXR 9/27/2021       EVENTS.    9/28/2021 Hb 8.4   9/28/2021 sputum 9/28 showed crp   9/28/2021 dexmedetoidine drip startd  9/27/2021 High pressuring vent chnged to vt 350     BEST PRACTICE ISSUES.                                                  HEAD OF BED ELEVATION. Yes  DVT PROPHYLAXIS.   hpsc 9/26/2021                                       SQUIRES PROPHYLAXIS.  carafate (9/25)                                                                                         DIET.            jevity 1.5 30/h 9/26/2021 gt               INFECTION PROPHYLAXIS.   chlorhexidine .12% 9/26/2021                      GENERAL ISSUES  GOC ADVANCED DIRECTIVE.                                         ALLERGY.  codeine                            WEIGHT.      9/25/2021 61                              BMI.                 9/25/2021 22       PATIENT DATA   TUBES  PROCEDURES.     poa Trach  poa peg    ABG.   9/26 7p 40%/vent 743/39/76    OXYGENATION.                   VITALS/IO/VENT/DRIPS.    9/28/2021 afeb 80 120/50   9/28/2021 dexm  9/28/2021  18/350/12/40    PROBLEM ASSESSMENT/RECOMMENDATIONS.  COVID STATUS.  scv2 9/25/2021 (-)  spk ab 9/26/2021 (+) 250   RESP.  VDRF.   Lung protective ventilation  target po 90-95%  9/27/2021 High pip   will decrease vt to 350 and order abg     HEMODYNAMICS.   Bp 120/50 poa  lr 80 (9/25) Dced 9/27  target map 65 (+)  LACTICEMIA.   la 9/25-9/25 la 5.8-1.4  Resolvd  COPD.  prov p (9/25)  atrov (9/25)       INFECTION.  VAP.   UTI.  W 9/25-9/26-9/27/2021 w 13-9.6-7.8   pr 9/26/2021 pr 2.1   ua 9/25/2021 w 26-50 l estrase mod   ct cap 9/25/2021 dense bl cpnsolidations new mediastinal lne Small bl effs dilated fluid filled rectum   9/26 sp stenotrophomonas carbapenem resistant pseucomonas serratia  urine c 9/25 uc 100k proteus   zosyn 9/25    ANEMIA.  Hb 9/25-9/26-9/27/2021 hb 10 - 9.1-8.4-   RYAN.  Cr 9/25-9/26/2021 cr 1.3-1   ELEVATED LFTS.  LFTS 9/25/2021  ap 170  ast 41  alt 33     ASSESSMENT RECOMMENDATIONS .    VDRF anoxic encephalopathy patient  admitted 9/25/2021 with VAP UTI resp distress started on bsab      TIME SPENT   Over 36 minutes aggregate critical care time spent on encounter; activities included   direct patient care, counseling and/or coordinating care reviewing notes, lab data/ imaging , discussion with multidisciplinary team/ patient  /family and explaining in detail risks, benefits, alternatives  of the recommendations     CHAPINCITO GUIDRY 75 f [facility] S 9/24/2021   DR ILIANA KEMP

## 2021-09-28 NOTE — PROGRESS NOTE ADULT - ASSESSMENT
Patient is a 75 year old female with PMH of HTN, DM type 2, CVA, recent Cardiac Arrest, Chronic Respiratory Failure, Vent Dependant s/p trach & PEG, Anemia with GI blood loss, and Dementia who was sent from [facility] facility for acute respiratory distress  Sepsis due to recurrent ventilator associated pneumonia vs aspiration pneumonia, possible UTI    - fever 100.6F yesterday, now afebrile. Leukocytosis normalized. Sepsis resolved.    - trach to vent, FiO2 50%.     - CT imaging reviewed, new dense b/l lung consolidations, mediastinal lad, small b/l effusions and atelectasis,  recent adm with PNA with Serratia marcescens and Pseudomonas aeruginosa in sputum    - RVP/COVID and legionella urine ag negative    - UA with pyuria, has vaughn in place with yellow urine; prior cx reviewed colonized with Proteus    Recommendations:   PNA-  based on prior micro and clinical response so far    - s/p vancomycin and pip-tazo,    - 9/28 noted Serratia marcescens and Pseudomonas aeruginosa and now stenotrophomonas in sputum    will follow for any need to add any coverage for steno but pt appears to be improving on current Rx so no change in current plan  Acute on chronic respiratory distress due to above  RYAN, nephrology following,  Continue on pip-tazo (started late 9/25) so anticipate 5 days with last full day 9/30 but may extend based on sputum micro, clinical response and risk benefit of extended abx.  -pt remains in SPCU on ventilatory support    We will follow along in the care of this patient. Please call us at [phone] or text me directly on my cell# at [phone] with any concerns.

## 2021-09-29 LAB
-  AMIKACIN: SIGNIFICANT CHANGE UP
-  AMOXICILLIN/CLAVULANIC ACID: SIGNIFICANT CHANGE UP
-  AMPICILLIN/SULBACTAM: SIGNIFICANT CHANGE UP
-  AMPICILLIN: SIGNIFICANT CHANGE UP
-  AZTREONAM: SIGNIFICANT CHANGE UP
-  CEFAZOLIN: SIGNIFICANT CHANGE UP
-  CEFEPIME: SIGNIFICANT CHANGE UP
-  CEFOXITIN: SIGNIFICANT CHANGE UP
-  CEFTRIAXONE: SIGNIFICANT CHANGE UP
-  CIPROFLOXACIN: SIGNIFICANT CHANGE UP
-  ERTAPENEM: SIGNIFICANT CHANGE UP
-  GENTAMICIN: SIGNIFICANT CHANGE UP
-  LEVOFLOXACIN: SIGNIFICANT CHANGE UP
-  MEROPENEM: SIGNIFICANT CHANGE UP
-  NITROFURANTOIN: SIGNIFICANT CHANGE UP
-  PIPERACILLIN/TAZOBACTAM: SIGNIFICANT CHANGE UP
-  TOBRAMYCIN: SIGNIFICANT CHANGE UP
-  TRIMETHOPRIM/SULFAMETHOXAZOLE: SIGNIFICANT CHANGE UP
A1C WITH ESTIMATED AVERAGE GLUCOSE RESULT: 6.6 % — HIGH (ref 4–5.6)
ALBUMIN SERPL ELPH-MCNC: 2 G/DL — LOW (ref 3.3–5)
ALP SERPL-CCNC: 106 U/L — SIGNIFICANT CHANGE UP (ref 30–120)
ALT FLD-CCNC: 18 U/L DA — SIGNIFICANT CHANGE UP (ref 10–60)
ANION GAP SERPL CALC-SCNC: 9 MMOL/L — SIGNIFICANT CHANGE UP (ref 5–17)
AST SERPL-CCNC: 10 U/L — SIGNIFICANT CHANGE UP (ref 10–40)
BILIRUB SERPL-MCNC: 0.4 MG/DL — SIGNIFICANT CHANGE UP (ref 0.2–1.2)
BUN SERPL-MCNC: 13 MG/DL — SIGNIFICANT CHANGE UP (ref 7–23)
CALCIUM SERPL-MCNC: 8.5 MG/DL — SIGNIFICANT CHANGE UP (ref 8.4–10.5)
CHLORIDE SERPL-SCNC: 106 MMOL/L — SIGNIFICANT CHANGE UP (ref 96–108)
CO2 SERPL-SCNC: 26 MMOL/L — SIGNIFICANT CHANGE UP (ref 22–31)
CREAT SERPL-MCNC: 0.92 MG/DL — SIGNIFICANT CHANGE UP (ref 0.5–1.3)
CULTURE RESULTS: SIGNIFICANT CHANGE UP
ESTIMATED AVERAGE GLUCOSE: 143 MG/DL — HIGH (ref 68–114)
GLUCOSE SERPL-MCNC: 103 MG/DL — HIGH (ref 70–99)
HCT VFR BLD CALC: 25.1 % — LOW (ref 34.5–45)
HGB BLD-MCNC: 7.5 G/DL — LOW (ref 11.5–15.5)
MCHC RBC-ENTMCNC: 27.3 PG — SIGNIFICANT CHANGE UP (ref 27–34)
MCHC RBC-ENTMCNC: 29.9 GM/DL — LOW (ref 32–36)
MCV RBC AUTO: 91.3 FL — SIGNIFICANT CHANGE UP (ref 80–100)
METHOD TYPE: SIGNIFICANT CHANGE UP
NRBC # BLD: 0 /100 WBCS — SIGNIFICANT CHANGE UP (ref 0–0)
ORGANISM # SPEC MICROSCOPIC CNT: SIGNIFICANT CHANGE UP
ORGANISM # SPEC MICROSCOPIC CNT: SIGNIFICANT CHANGE UP
PLATELET # BLD AUTO: 346 K/UL — SIGNIFICANT CHANGE UP (ref 150–400)
POTASSIUM SERPL-MCNC: 3.8 MMOL/L — SIGNIFICANT CHANGE UP (ref 3.5–5.3)
POTASSIUM SERPL-SCNC: 3.8 MMOL/L — SIGNIFICANT CHANGE UP (ref 3.5–5.3)
PROT SERPL-MCNC: 6.3 G/DL — SIGNIFICANT CHANGE UP (ref 6–8.3)
RBC # BLD: 2.75 M/UL — LOW (ref 3.8–5.2)
RBC # FLD: 18.9 % — HIGH (ref 10.3–14.5)
SODIUM SERPL-SCNC: 141 MMOL/L — SIGNIFICANT CHANGE UP (ref 135–145)
SPECIMEN SOURCE: SIGNIFICANT CHANGE UP
WBC # BLD: 7.44 K/UL — SIGNIFICANT CHANGE UP (ref 3.8–10.5)
WBC # FLD AUTO: 7.44 K/UL — SIGNIFICANT CHANGE UP (ref 3.8–10.5)

## 2021-09-29 PROCEDURE — 99233 SBSQ HOSP IP/OBS HIGH 50: CPT

## 2021-09-29 RX ADMIN — Medication 1 MILLIGRAM(S): at 17:33

## 2021-09-29 RX ADMIN — Medication 81 MILLIGRAM(S): at 11:27

## 2021-09-29 RX ADMIN — Medication 1 MILLIGRAM(S): at 05:10

## 2021-09-29 RX ADMIN — Medication 1 GRAM(S): at 11:27

## 2021-09-29 RX ADMIN — Medication 1 MILLIGRAM(S): at 11:51

## 2021-09-29 RX ADMIN — PIPERACILLIN AND TAZOBACTAM 25 GRAM(S): 4; .5 INJECTION, POWDER, LYOPHILIZED, FOR SOLUTION INTRAVENOUS at 22:16

## 2021-09-29 RX ADMIN — ALBUTEROL 1 PUFF(S): 90 AEROSOL, METERED ORAL at 07:55

## 2021-09-29 RX ADMIN — HEPARIN SODIUM 5000 UNIT(S): 5000 INJECTION INTRAVENOUS; SUBCUTANEOUS at 15:09

## 2021-09-29 RX ADMIN — Medication 1 MILLIGRAM(S): at 04:11

## 2021-09-29 RX ADMIN — CHLORHEXIDINE GLUCONATE 15 MILLILITER(S): 213 SOLUTION TOPICAL at 17:31

## 2021-09-29 RX ADMIN — Medication 1 GRAM(S): at 05:10

## 2021-09-29 RX ADMIN — FENTANYL CITRATE 1 PATCH: 50 INJECTION INTRAVENOUS at 07:31

## 2021-09-29 RX ADMIN — Medication 1 TABLET(S): at 11:27

## 2021-09-29 RX ADMIN — Medication 1 PUFF(S): at 13:43

## 2021-09-29 RX ADMIN — METHOCARBAMOL 250 MILLIGRAM(S): 500 TABLET, FILM COATED ORAL at 17:33

## 2021-09-29 RX ADMIN — PIPERACILLIN AND TAZOBACTAM 25 GRAM(S): 4; .5 INJECTION, POWDER, LYOPHILIZED, FOR SOLUTION INTRAVENOUS at 05:10

## 2021-09-29 RX ADMIN — ALBUTEROL 1 PUFF(S): 90 AEROSOL, METERED ORAL at 01:24

## 2021-09-29 RX ADMIN — PANTOPRAZOLE SODIUM 40 MILLIGRAM(S): 20 TABLET, DELAYED RELEASE ORAL at 11:27

## 2021-09-29 RX ADMIN — Medication 1 PUFF(S): at 19:59

## 2021-09-29 RX ADMIN — HEPARIN SODIUM 5000 UNIT(S): 5000 INJECTION INTRAVENOUS; SUBCUTANEOUS at 22:15

## 2021-09-29 RX ADMIN — Medication 1 PUFF(S): at 07:55

## 2021-09-29 RX ADMIN — CHLORHEXIDINE GLUCONATE 15 MILLILITER(S): 213 SOLUTION TOPICAL at 05:11

## 2021-09-29 RX ADMIN — Medication 1 PUFF(S): at 01:24

## 2021-09-29 RX ADMIN — Medication 1 GRAM(S): at 17:33

## 2021-09-29 RX ADMIN — HEPARIN SODIUM 5000 UNIT(S): 5000 INJECTION INTRAVENOUS; SUBCUTANEOUS at 05:11

## 2021-09-29 RX ADMIN — SENNA PLUS 1 TABLET(S): 8.6 TABLET ORAL at 11:27

## 2021-09-29 RX ADMIN — METHOCARBAMOL 250 MILLIGRAM(S): 500 TABLET, FILM COATED ORAL at 05:10

## 2021-09-29 RX ADMIN — INSULIN GLARGINE 36 UNIT(S): 100 INJECTION, SOLUTION SUBCUTANEOUS at 22:51

## 2021-09-29 RX ADMIN — ALBUTEROL 1 PUFF(S): 90 AEROSOL, METERED ORAL at 13:43

## 2021-09-29 RX ADMIN — Medication 1 MILLIGRAM(S): at 11:27

## 2021-09-29 RX ADMIN — Medication 10 MILLIGRAM(S): at 22:16

## 2021-09-29 RX ADMIN — FENTANYL CITRATE 1 PATCH: 50 INJECTION INTRAVENOUS at 21:16

## 2021-09-29 RX ADMIN — PIPERACILLIN AND TAZOBACTAM 25 GRAM(S): 4; .5 INJECTION, POWDER, LYOPHILIZED, FOR SOLUTION INTRAVENOUS at 15:10

## 2021-09-29 NOTE — PROGRESS NOTE ADULT - ASSESSMENT
Patient is a 75 year old female with PMH of HTN, DM type 2, CVA, recent Cardiac Arrest, Chronic Respiratory Failure, Vent Dependant s/p trach & PEG, Anemia with GI blood loss, and Dementia who was sent from [facility] facility for acute respiratory distress  Sepsis due to recurrent ventilator associated pneumonia vs aspiration pneumonia, possible UTI    - fever 100.6F yesterday, now afebrile. Leukocytosis normalized. Sepsis resolved.    - trach to vent, FiO2 50%.     - CT imaging reviewed, new dense b/l lung consolidations, mediastinal lad, small b/l effusions and atelectasis,  recent adm with PNA with Serratia marcescens and Pseudomonas aeruginosa in sputum    - RVP/COVID and legionella urine ag negative    - UA with pyuria, has vaughn in place with yellow urine; prior cx reviewed colonized with Proteus    Recommendations:   PNA-  based on prior micro and clinical response so far    - s/p vancomycin and pip-tazo,    - 9/28 noted Serratia marcescens and Pseudomonas aeruginosa and now stenotrophomonas in sputum    will follow for any need to add any coverage for steno but pt appears to be improving on current Rx so no change in current plan  Acute on chronic respiratory distress due to above  RYAN, nephrology following,  Continue on pip-tazo (started late 9/25) so anticipate 5 days with last full day 9/30   -pt remains in SPCU on ventilatory support    We will follow along in the care of this patient. Please call us at [phone] or text me directly on my cell# at [phone] with any concerns.

## 2021-09-29 NOTE — PROGRESS NOTE ADULT - SUBJECTIVE AND OBJECTIVE BOX
Patient is a 75y Female whom presented to the hospital with ckd and manasa     PAST MEDICAL & SURGICAL HISTORY:  Dementia of frontal lobe type    Aphasic stroke    Diabetes mellitus    Respiratory failure    Hypertension    GERD (gastroesophageal reflux disease)    Constipation    Respiratory failure    CVA (cerebral vascular accident)    HTN (hypertension)    DM (diabetes mellitus)    Advanced dementia    COVID-19 virus detected    Quadriplegia    Pneumonia    Type II diabetes mellitus    Hx of appendectomy    Gastrostomy in place    Tracheostomy in place    Tracheostomy tube present    Feeding by G-tube        MEDICATIONS  (STANDING):  aspirin  chewable 81 milliGRAM(s) Oral daily  chlorhexidine 0.12% Liquid 15 milliLiter(s) Oral Mucosa every 12 hours  chlorhexidine 4% Liquid 1 Application(s) Topical <User Schedule>  dextrose 40% Gel 15 Gram(s) Oral once  dextrose 5%. 1000 milliLiter(s) (50 mL/Hr) IV Continuous <Continuous>  dextrose 5%. 1000 milliLiter(s) (100 mL/Hr) IV Continuous <Continuous>  dextrose 50% Injectable 25 Gram(s) IV Push once  dextrose 50% Injectable 12.5 Gram(s) IV Push once  dextrose 50% Injectable 25 Gram(s) IV Push once  glucagon  Injectable 1 milliGRAM(s) IntraMuscular once  heparin   Injectable 5000 Unit(s) SubCutaneous every 8 hours  insulin lispro (ADMELOG) corrective regimen sliding scale   SubCutaneous every 6 hours  ipratropium 17 MICROgram(s) HFA Inhaler 1 Puff(s) Inhalation every 6 hours  lactated ringers. 1000 milliLiter(s) (80 mL/Hr) IV Continuous <Continuous>  lactobacillus acidophilus 1 Tablet(s) Oral daily  norepinephrine Infusion 0.05 MICROgram(s)/kG/Min (5.74 mL/Hr) IV Continuous <Continuous>  pantoprazole  Injectable 40 milliGRAM(s) IV Push daily  piperacillin/tazobactam IVPB.. 3.375 Gram(s) IV Intermittent every 8 hours  senna 1 Tablet(s) Oral daily      Allergies    codeine (Hives)    Intolerances        SOCIAL HISTORY:  Denies ETOh,Smoking,     FAMILY HISTORY:  No pertinent family history in first degree relatives        REVIEW OF SYSTEMS:  unable to obtained a good review system                                                                         7.5    7.44  )-----------( 346      ( 29 Sep 2021 07:08 )             25.1       CBC Full  -  ( 29 Sep 2021 07:08 )  WBC Count : 7.44 K/uL  RBC Count : 2.75 M/uL  Hemoglobin : 7.5 g/dL  Hematocrit : 25.1 %  Platelet Count - Automated : 346 K/uL  Mean Cell Volume : 91.3 fl  Mean Cell Hemoglobin : 27.3 pg  Mean Cell Hemoglobin Concentration : 29.9 gm/dL  Auto Neutrophil # : x  Auto Lymphocyte # : x  Auto Monocyte # : x  Auto Eosinophil # : x  Auto Basophil # : x  Auto Neutrophil % : x  Auto Lymphocyte % : x  Auto Monocyte % : x  Auto Eosinophil % : x  Auto Basophil % : x      09-29    141  |  106  |  13  ----------------------------<  103<H>  3.8   |  26  |  0.92    Ca    8.5      29 Sep 2021 07:08    TPro  6.3  /  Alb  2.0<L>  /  TBili  0.4  /  DBili  x   /  AST  10  /  ALT  18  /  AlkPhos  106  09-29      CAPILLARY BLOOD GLUCOSE      POCT Blood Glucose.: 147 mg/dL (29 Sep 2021 17:27)  POCT Blood Glucose.: 118 mg/dL (29 Sep 2021 11:25)  POCT Blood Glucose.: 108 mg/dL (29 Sep 2021 05:09)  POCT Blood Glucose.: 157 mg/dL (28 Sep 2021 23:45)      Vital Signs Last 24 Hrs  T(C): 36.8 (29 Sep 2021 16:04), Max: 36.8 (28 Sep 2021 21:37)  T(F): 98.2 (29 Sep 2021 16:04), Max: 98.3 (28 Sep 2021 21:37)  HR: 79 (29 Sep 2021 18:00) (65 - 104)  BP: 92/50 (29 Sep 2021 18:00) (92/50 - 160/63)  BP(mean): 65 (29 Sep 2021 18:00) (65 - 93)  RR: 18 (29 Sep 2021 18:00) (16 - 33)  SpO2: 99% (29 Sep 2021 18:00) (94% - 100%)               PHYSICAL EXAM:    Constitutional: NAD  HEENT: conjunctive   clear   Neck:  No JVD  Respiratory: decrease bs b/l   Cardiovascular: S1 and S2  Gastrointestinal: BS+, soft, pos peg   Extremities: No peripheral edema

## 2021-09-29 NOTE — PROGRESS NOTE ADULT - SUBJECTIVE AND OBJECTIVE BOX
Neurology follow up note    BREA [id number]      Interval History:    Patient resting in bed     Allergies    codeine (Hives)    Intolerances        MEDICATIONS    ALBUTerol    90 MICROgram(s) HFA Inhaler 1 Puff(s) Inhalation every 6 hours PRN  aspirin  chewable 81 milliGRAM(s) Oral daily  bisacodyl Suppository 10 milliGRAM(s) Rectal at bedtime  chlorhexidine 0.12% Liquid 15 milliLiter(s) Oral Mucosa every 12 hours  dexMEDEtomidine Infusion 0.2 MICROgram(s)/kG/Hr IV Continuous <Continuous>  dextrose 40% Gel 15 Gram(s) Oral once  dextrose 5%. 1000 milliLiter(s) IV Continuous <Continuous>  dextrose 5%. 1000 milliLiter(s) IV Continuous <Continuous>  dextrose 50% Injectable 25 Gram(s) IV Push once  dextrose 50% Injectable 12.5 Gram(s) IV Push once  dextrose 50% Injectable 25 Gram(s) IV Push once  fentaNYL   Patch  12 MICROgram(s)/Hr 1 Patch Transdermal every 72 hours  glucagon  Injectable 1 milliGRAM(s) IntraMuscular once  heparin   Injectable 5000 Unit(s) SubCutaneous every 8 hours  insulin glargine Injectable (LANTUS) 36 Unit(s) SubCutaneous at bedtime  insulin lispro (ADMELOG) corrective regimen sliding scale   SubCutaneous every 6 hours  ipratropium 17 MICROgram(s) HFA Inhaler 1 Puff(s) Inhalation every 6 hours  lactobacillus acidophilus 1 Tablet(s) Oral daily  LORazepam     Tablet 1 milliGRAM(s) Oral every 6 hours  LORazepam   Injectable 1 milliGRAM(s) IV Push every 6 hours PRN  methocarbamol 250 milliGRAM(s) Oral two times a day  pantoprazole  Injectable 40 milliGRAM(s) IV Push daily  piperacillin/tazobactam IVPB.. 3.375 Gram(s) IV Intermittent every 8 hours  polyethylene glycol 3350 17 Gram(s) Oral every 12 hours PRN  senna 1 Tablet(s) Oral daily  simethicone 80 milliGRAM(s) Chew two times a day PRN  sucralfate suspension 1 Gram(s) Enteral Tube four times a day              Vital Signs Last 24 Hrs  T(C): 36.6 (29 Sep 2021 12:45), Max: 36.9 (28 Sep 2021 16:25)  T(F): 97.9 (29 Sep 2021 12:45), Max: 98.4 (28 Sep 2021 16:25)  HR: 69 (29 Sep 2021 10:52) (65 - 108)  BP: 115/53 (29 Sep 2021 08:00) (97/53 - 160/63)  BP(mean): 72 (29 Sep 2021 08:00) (66 - 93)  RR: 18 (29 Sep 2021 08:00) (16 - 32)  SpO2: 98% (29 Sep 2021 10:52) (94% - 100%)      REVIEW OF SYSTEMS:  Could not be obtained secondary to the patient being nonverbal.  As per my conversation with the spouse, a gradual process along with underlying strokes causing her to become bed-bound.    PHYSICAL EXAMINATION:    Head:  Normocephalic, atraumatic.  Eyes:  No scleral icterus.  Ears:  Cannot be evaluated secondary to the patient being nonverbal.  NECK:  Tracheostomy was in place.  RESPIRATORY:  Good air entry bilaterally.  CARDIOVASCULAR:  S1 and S2 heard.  ABDOMEN:  Soft and nontender.  EXTREMITIES:  No clubbing or cyanosis were noted.      NEUROLOGIC:  The patient was awake in bed.  At present no movement.  does have a history upon stimulating the patient, her eyes did roll up.  Her body started to stiffen with abnormal mouth movements, lasted one to two minutes history of this  Pupils bilaterally were 3 mm, reactive to 2 mm.  The patient has her arms in a flexed position with both hands contracted.  Bilateral lower extremities were in a straight position.  The patient has increased tone, bilateral upper and lower extremities.                 LABS:  CBC Full  -  ( 29 Sep 2021 07:08 )  WBC Count : 7.44 K/uL  RBC Count : 2.75 M/uL  Hemoglobin : 7.5 g/dL  Hematocrit : 25.1 %  Platelet Count - Automated : 346 K/uL  Mean Cell Volume : 91.3 fl  Mean Cell Hemoglobin : 27.3 pg  Mean Cell Hemoglobin Concentration : 29.9 gm/dL  Auto Neutrophil # : x  Auto Lymphocyte # : x  Auto Monocyte # : x  Auto Eosinophil # : x  Auto Basophil # : x  Auto Neutrophil % : x  Auto Lymphocyte % : x  Auto Monocyte % : x  Auto Eosinophil % : x  Auto Basophil % : x      09-29    141  |  106  |  13  ----------------------------<  103<H>  3.8   |  26  |  0.92    Ca    8.5      29 Sep 2021 07:08    TPro  6.3  /  Alb  2.0<L>  /  TBili  0.4  /  DBili  x   /  AST  10  /  ALT  18  /  AlkPhos  106  09-29    Hemoglobin A1C:     LIVER FUNCTIONS - ( 29 Sep 2021 07:08 )  Alb: 2.0 g/dL / Pro: 6.3 g/dL / ALK PHOS: 106 U/L / ALT: 18 U/L DA / AST: 10 U/L / GGT: x           Vitamin B12         RADIOLOGY      ANALYSIS AND PLAN:  This is a 75-year-old with an episode of cardiac arrest and abnormal movements.  For episodes of cardiac arrest, continue to monitor heart rate on telemetry.  If possible, please maintain a systolic blood pressure greater than 100, to help maintain cerebral perfusion.  For history of abnormal movements, as per my conversation with the spouse, this is a known condition that happens to her, as per him with GI-related issues.  She will start to hyperventilate, eyes will roll up, will have abnormal mouth movement, and tried to move from side-to-side.  She has undergone extensive EEG monitoring and has captured these events on numerous occasions and deemed to be non-epileptiform.  For now, I would recommend supportive therapy.  No antiepileptic medications.  For history of diabetes, strict control of blood sugars.  For history of cerebrovascular accident, continue the patient on her home medications.  smuscle relaxant     The spouse's name is Marciano.  His telephone number is [phone]     Greater than 40 minutes of time was spent with the patient, plan of care, reviewing data, speaking to the multidisciplinary healthcare team with greater than 50% of that time in counseling and care coordination.

## 2021-09-29 NOTE — PROVIDER CONTACT NOTE (CRITICAL VALUE NOTIFICATION) - ACTION/TREATMENT ORDERED:
repeat lactate after bolus NS fluids and antibiotics
Patient placed on contact precautions, Naya Brownlee, infectious disease RN made aware.

## 2021-09-29 NOTE — CHART NOTE - NSCHARTNOTEFT_GEN_A_CORE
Assessment:     Pt seen for nutrition follow-up. Pt is a 74 y/o F with PMHx of HTN, DM type 2, CVA, recent Cardiac Arrest, Chronic Respiratory Failure, Vent Dependant s/p trach & PEG, Anemia with GI blood loss, and Dementia who was sent from [facility] facility for acute respiratory distress. Patient was admitted to [facility] about 3 weeks ago s/p cardiac arrest with B/L aspiration PNA, Sepsis, Proteus Mirabilis UTI, and infected foot ulcer, and discharged 10 days ago. Patient is awake & alert but non verbal.    Pt came from [facility] facility where pt was previously receiving EN feeds via PEG of Glucerna 1.5 @ 50 ml/hr x 20hr for a total volume of 1000 ml/day, providing pt w/ 1500kcal and 82.5g protein per day (+addtl 200ml before and after, +200ml at 2am= 600ml fluid/day in addition to tube feeding hourly flush). No food allergies per chart review. Per previous RD note from previous admission on 9/8/21, "bed scale weight of 114.8#; adm weight from V June 2021: 116#; transfer weight 120#; no significant wt changes recently per spouse". CBW on admission 134#, unsure of accuracy of wt. No edema noted. + BM 9/26.   Skin: R knee skin tear, wounds; L/R 1st toe, pressure injuries; R 4th toe DTI, R 5th toe DTI, L medial malleolus stage I. Pt previously NPO receiving IVF during initial nutrition assessment. Pt now NPO w/ TF of Jevity 1.5 at 30ml/hr. This is providing 1,080 kcal    When medically appropriate, recommend EN feeds via PEG of Glucerna 1.5 to goal 50 ml/hr x 20 hrs, to provide 1,500 kcal and 82.5 g protein. Recommend MVI and vitamin C supplementation to facilitate wound healing. RD to follow-up and will continue to monitor pt's nutrition status.    Factors impacting intake: [ ] none [ ] nausea  [ ] vomiting [ ] diarrhea [ ] constipation  [ ]chewing problems [ ] swallowing issues  [X] NPO w/ TF    Diet Prescription: Diet, NPO with Tube Feed:   Tube Feeding Modality: Gastrostomy  Jevity 1.5 Horacio  Total Volume for 24 Hours (mL): 720  Continuous  Starting Tube Feed Rate {mL per Hour}: 30  Until Goal Tube Feed Rate (mL per Hour): 30  Tube Feed Duration (in Hours): 24  Tube Feed Start Time: 19:00 (09-26-21 @ 18:08)    Intake: Pt currently receiving TF at goal rate    Current Weight: 127.8# (9/29)  % Weight Change  118.8# (9/26)  134# (9/25, admission wt)    Pertinent Medications: MEDICATIONS  (STANDING):  aspirin  chewable 81 milliGRAM(s) Oral daily  bisacodyl Suppository 10 milliGRAM(s) Rectal at bedtime  chlorhexidine 0.12% Liquid 15 milliLiter(s) Oral Mucosa every 12 hours  dexMEDEtomidine Infusion 0.2 MICROgram(s)/kG/Hr (3.06 mL/Hr) IV Continuous <Continuous>  dextrose 40% Gel 15 Gram(s) Oral once  dextrose 5%. 1000 milliLiter(s) (50 mL/Hr) IV Continuous <Continuous>  dextrose 5%. 1000 milliLiter(s) (100 mL/Hr) IV Continuous <Continuous>  dextrose 50% Injectable 25 Gram(s) IV Push once  dextrose 50% Injectable 12.5 Gram(s) IV Push once  dextrose 50% Injectable 25 Gram(s) IV Push once  fentaNYL   Patch  12 MICROgram(s)/Hr 1 Patch Transdermal every 72 hours  glucagon  Injectable 1 milliGRAM(s) IntraMuscular once  heparin   Injectable 5000 Unit(s) SubCutaneous every 8 hours  insulin glargine Injectable (LANTUS) 36 Unit(s) SubCutaneous at bedtime  insulin lispro (ADMELOG) corrective regimen sliding scale   SubCutaneous every 6 hours  ipratropium 17 MICROgram(s) HFA Inhaler 1 Puff(s) Inhalation every 6 hours  lactobacillus acidophilus 1 Tablet(s) Oral daily  LORazepam     Tablet 1 milliGRAM(s) Oral every 6 hours  methocarbamol 250 milliGRAM(s) Oral two times a day  pantoprazole  Injectable 40 milliGRAM(s) IV Push daily  piperacillin/tazobactam IVPB.. 3.375 Gram(s) IV Intermittent every 8 hours  senna 1 Tablet(s) Oral daily  sucralfate suspension 1 Gram(s) Enteral Tube four times a day    MEDICATIONS  (PRN):  ALBUTerol    90 MICROgram(s) HFA Inhaler 1 Puff(s) Inhalation every 6 hours PRN Shortness of Breath and/or Wheezing  LORazepam   Injectable 1 milliGRAM(s) IV Push every 6 hours PRN agtation and vent dyssynchrony  polyethylene glycol 3350 17 Gram(s) Oral every 12 hours PRN Constipation  simethicone 80 milliGRAM(s) Chew two times a day PRN Dyspepsia    Pertinent Labs: 09-29 Na141 mmol/L Glu 103 mg/dL<H> K+ 3.8 mmol/L Cr  0.92 mg/dL BUN 13 mg/dL 09-27 Phos 2.6 mg/dL 09-29 Alb 2.0 g/dL<L>    CAPILLARY BLOOD GLUCOSE:  POCT Blood Glucose.: 108 mg/dL (29 Sep 2021 05:09)  POCT Blood Glucose.: 157 mg/dL (28 Sep 2021 23:45)  POCT Blood Glucose.: 113 mg/dL (28 Sep 2021 17:15)  POCT Blood Glucose.: 250 mg/dL (28 Sep 2021 11:22)    Skin: Rt knee skin tear, Wounds; L/R 1st toe, Pressure Injuries; Rt 4th toe DTI, Rt 5th toe DTI, Lt medial malleolus stg I    Estimated Needs:   [X] no change since previous assessment  [ ] recalculated:     Previous Nutrition Diagnosis:   [ ] Inadequate Energy Intake [ ]Inadequate Oral Intake [ ] Excessive Energy Intake   [ ] Underweight [X] Increased Nutrient Needs [ ] Overweight/Obesity   [ ] Altered GI Function [ ] Unintended Weight Loss [ ] Food & Nutrition Related Knowledge Deficit [ ] Malnutrition     Nutrition Diagnosis is [X] ongoing  [ ] resolved [ ] not applicable     New Nutrition Diagnosis: [X] not applicable       Interventions: Recommend increasing Jevity 1.5 rate to in order to meet pt's estimated nutritional needs  Recommend  [ ] Change Diet To:  [ ] Nutrition Supplement  [ ] Nutrition Support  [ ] Other:     Monitoring and Evaluation:   [ ] PO intake [ x ] Tolerance to diet prescription [ x ] weights [ x ] labs[ x ] follow up per protocol  [ ] other: Assessment:     Pt seen for nutrition follow-up. Pt is a 76 y/o F with PMHx of HTN, DM type 2, CVA, recent Cardiac Arrest, Chronic Respiratory Failure, Vent Dependant s/p trach & PEG, Anemia with GI blood loss, and Dementia who was sent from [facility] facility for acute respiratory distress. Patient was admitted to [facility] about 3 weeks ago s/p cardiac arrest with B/L aspiration PNA, Sepsis, Proteus Mirabilis UTI, and infected foot ulcer, and discharged 10 days ago. Patient is awake & alert but non verbal.    Pt came from [facility] facility where pt was previously receiving EN feeds via PEG of Glucerna 1.5 @ 50 ml/hr x 20hr for a total volume of 1000 ml/day, providing pt w/ 1500kcal and 82.5g protein per day (+addtl 200ml before and after, +200ml at 2am= 600ml fluid/day in addition to tube feeding hourly flush). No food allergies per chart review. No edema noted. + BM 9/28. Skin: R knee skin tear, wounds; L/R 1st toe, pressure injuries; R 4th toe DTI, R 5th toe DTI, L medial malleolus stage I. Pt previously NPO receiving IVF during initial nutrition assessment. Pt now NPO w/ TF of Jevity 1.5 @ 30ml/hr x 24 hrs, which is providing 720 ml total volume, 1,080 kcal, 46 g protein. Pt also receiving 200ml free water q 6 hrs. Pt currently tolerating TF well. Recommend increasing Jevity 1.5 TF rate from 30 ml/hr to 40 ml/hr in order to meet pt's estimated needs. Jevity 1.5 @ 40 ml/hr will provide 960 ml total volume, 1,400 kcal, 61.2 g protein/day. Also recommend no carb prosource BID to provide an additional 120kcal, 30 g protein/day. Recommend addition of MVI and vitamin C supplementation to facilitate wound healing. RD to follow-up and will continue to monitor pt's nutrition status.    Factors impacting intake: [ ] none [ ] nausea  [ ] vomiting [ ] diarrhea [ ] constipation  [ ]chewing problems [ ] swallowing issues  [X] NPO w/ TF    Diet Prescription: Diet, NPO with Tube Feed:   Tube Feeding Modality: Gastrostomy  Jevity 1.5 Horacio  Total Volume for 24 Hours (mL): 720  Continuous  Starting Tube Feed Rate {mL per Hour}: 30  Until Goal Tube Feed Rate (mL per Hour): 30  Tube Feed Duration (in Hours): 24  Tube Feed Start Time: 19:00 (09-26-21 @ 18:08)    Intake: Pt currently receiving TF at goal rate    Current Weight: 127.8# (9/29)  % Weight Change  118.8# (9/26)  134# (9/25, admission wt)    Pertinent Medications: MEDICATIONS  (STANDING):  aspirin  chewable 81 milliGRAM(s) Oral daily  bisacodyl Suppository 10 milliGRAM(s) Rectal at bedtime  chlorhexidine 0.12% Liquid 15 milliLiter(s) Oral Mucosa every 12 hours  dexMEDEtomidine Infusion 0.2 MICROgram(s)/kG/Hr (3.06 mL/Hr) IV Continuous <Continuous>  dextrose 40% Gel 15 Gram(s) Oral once  dextrose 5%. 1000 milliLiter(s) (50 mL/Hr) IV Continuous <Continuous>  dextrose 5%. 1000 milliLiter(s) (100 mL/Hr) IV Continuous <Continuous>  dextrose 50% Injectable 25 Gram(s) IV Push once  dextrose 50% Injectable 12.5 Gram(s) IV Push once  dextrose 50% Injectable 25 Gram(s) IV Push once  fentaNYL   Patch  12 MICROgram(s)/Hr 1 Patch Transdermal every 72 hours  glucagon  Injectable 1 milliGRAM(s) IntraMuscular once  heparin   Injectable 5000 Unit(s) SubCutaneous every 8 hours  insulin glargine Injectable (LANTUS) 36 Unit(s) SubCutaneous at bedtime  insulin lispro (ADMELOG) corrective regimen sliding scale   SubCutaneous every 6 hours  ipratropium 17 MICROgram(s) HFA Inhaler 1 Puff(s) Inhalation every 6 hours  lactobacillus acidophilus 1 Tablet(s) Oral daily  LORazepam     Tablet 1 milliGRAM(s) Oral every 6 hours  methocarbamol 250 milliGRAM(s) Oral two times a day  pantoprazole  Injectable 40 milliGRAM(s) IV Push daily  piperacillin/tazobactam IVPB.. 3.375 Gram(s) IV Intermittent every 8 hours  senna 1 Tablet(s) Oral daily  sucralfate suspension 1 Gram(s) Enteral Tube four times a day    MEDICATIONS  (PRN):  ALBUTerol    90 MICROgram(s) HFA Inhaler 1 Puff(s) Inhalation every 6 hours PRN Shortness of Breath and/or Wheezing  LORazepam   Injectable 1 milliGRAM(s) IV Push every 6 hours PRN agtation and vent dyssynchrony  polyethylene glycol 3350 17 Gram(s) Oral every 12 hours PRN Constipation  simethicone 80 milliGRAM(s) Chew two times a day PRN Dyspepsia    Pertinent Labs: 09-29 Na141 mmol/L Glu 103 mg/dL<H> K+ 3.8 mmol/L Cr  0.92 mg/dL BUN 13 mg/dL 09-27 Phos 2.6 mg/dL 09-29 Alb 2.0 g/dL<L>    CAPILLARY BLOOD GLUCOSE:  POCT Blood Glucose.: 108 mg/dL (29 Sep 2021 05:09)  POCT Blood Glucose.: 157 mg/dL (28 Sep 2021 23:45)  POCT Blood Glucose.: 113 mg/dL (28 Sep 2021 17:15)  POCT Blood Glucose.: 250 mg/dL (28 Sep 2021 11:22)    Skin: Rt knee skin tear, Wounds; L/R 1st toe, Pressure Injuries; Rt 4th toe DTI, Rt 5th toe DTI, Lt medial malleolus stg I    Estimated Needs:   [X] no change since previous assessment  [ ] recalculated:     Previous Nutrition Diagnosis:   [ ] Inadequate Energy Intake [ ]Inadequate Oral Intake [ ] Excessive Energy Intake   [ ] Underweight [X] Increased Nutrient Needs [ ] Overweight/Obesity   [ ] Altered GI Function [ ] Unintended Weight Loss [ ] Food & Nutrition Related Knowledge Deficit [ ] Malnutrition     Nutrition Diagnosis is [X] ongoing  [ ] resolved [ ] not applicable     New Nutrition Diagnosis: [X] not applicable       Interventions: Recommend increasing Jevity 1.5 rate to 40 ml/hr in order to meet pt's estimated nutritional needs, recommend no carb prosource BID to provide an additional 120kcal, 30 g protein/day  Recommend  [ ] Change Diet To:  [ ] Nutrition Supplement  [ ] Nutrition Support  [ ] Other:     Monitoring and Evaluation:   [ ] PO intake [ x ] Tolerance to diet prescription [ x ] weights [ x ] labs[ x ] follow up per protocol  [ ] other:

## 2021-09-29 NOTE — PROGRESS NOTE ADULT - ASSESSMENT
CHAPINCITO GUIDRY 75 f [facility] S 9/24/2021   DR ILIANA KEMP     REVIEW OF SYMPTOMS      Able to give (reliable) ROS  NO     PHYSICAL EXAM    HEENT Unremarkable  atraumatic   RESP Fair air entry EXP prolonged    Harsh breath sound Resp distres mild   CARDIAC S1 S2 No S3     NO JVD    ABDOMEN SOFT BS PRESENT NOT DISTENDED No hepatosplenomegaly   PEDAL EDEMA present No calf tenderness  NO rash                                      PATIENT PRESENTATION.  74 f [facility] OBS cva chr vdrf peg past ho vap past ho pseudomonas sp recently  admitted 9/7-9/15 sp cac asp pneu  Rxed with zosyn  Shock and lacticemia poa resolvd   Had sz 9/9 Noted to have anemia   Has toe lesion seen by podiatry 9/10  Patient now readmitted 9/25/2021 with resp distress   On arrival 120/57 116 24 o vent          PROBLEMS.        VAP poa 9/25  UTI poa 9/25  VDRF ([facility]h)  PEG ([facility])  ANOXIC ENCEPH ([facility])     WORSENING CXR 9/27/2021   ANEMIA 9/29/2021 Hb 7.5          BEST PRACTICE ISSUES.                                                  HEAD OF BED ELEVATION. Yes  [facility].   [facility]                                       SQUIRES PROPHYLAXIS.  carafate (9/25)                                                                                         DIET.            jevity 1.5 30/h 9/26/2021 gt               INFECTION PROPHYLAXIS.   chlorhexidine .12% 9/26/2021                      GENERAL ISSUES  GOC ADVANCED DIRECTIVE.                                         ALLERGY.  codeine                            WEIGHT.      9/25/2021 61                              BMI.                 9/25/2021 22     PATIENT DATA   TUBES  PROCEDURES.     poa Trach  poa peg    ABG.   9/26 7p 40%/vent 743/39/76    OXYGENATION.                   VITALS/IO/VENT/DRIPS.    9/29/2021 afeb 70 100/50   9/29/2021 u 800   9/29/2021 prvc 18/400/8/.4       PROBLEM ASSESSMENT/RECOMMENDATIONS.  COVID STATUS.  scv2 9/25/2021 (-)  spk ab 9/26/2021 (+) 250   RESP.  VDRF.   Lung protective ventilation  target po 90-95%  clinically stable     INFECTION.  VAP.   UTI.  W 9/25-9/26-9/27/2021 w 13-9.6-7.8   pr 9/26/2021 pr 2.1   ua 9/25/2021 w 26-50 l estrase mod   9/27/2021 cxr bl perihilar and lower lobe consolidations progressive   eventration r diaphr   ct cap 9/25/2021 dense bl cpnsolidations new mediastinal lne Small bl effs dilated fluid filled rectum   mrsa 9/27 (-)   9/26 sp   1) stenotrophomonas   2) carbapenem resistant pseucomonas   3) serratia  urine c 9/25 uc 100k proteus   zosyn 9/25 Abio course to end 9/30   COPD.  prov p (9/25)  atrov (9/25)  under control   ANEMIA.  Hb 9/25-9/26-9/27-9/29/2021 hb 10 - 9.1-8.4-7.5      ASSESSMENT RECOMMENDATIONS .    VDRF anoxic encephalopathy patient  admitted 9/25/2021 with VAP UTI resp distress started on bsab      TIME SPENT   Over 36 minutes aggregate critical care time spent on encounter; activities included   direct patient care, counseling and/or coordinating care reviewing notes, lab data/ imaging , discussion with multidisciplinary team/ patient  /family and explaining in detail risks, benefits, alternatives  of the recommendations     CHAPINCITO GUIDRY 75 f [facility] S 9/24/2021   DR ILIANA KEMP

## 2021-09-29 NOTE — PROGRESS NOTE ADULT - ASSESSMENT
74 y/o female with a PMHx of DM2, pna, quadriplegia, advanced dementia, DM, CVA, GERD, HTN presents to the ED c/o recent admitted to [facility] 9[facility]7-9/15 after being found unresponsive at nursing home, concerned about post operative distress, hypotensive signs of aspirational pna, with volume dissipated, treated with levosa, zosyn. Admitted to ICU for aspirational pna. Urinary culture proteus, which was also sensitive to Zosyn. Also appears to have paronychia of toe which she had Zyvox during hospital stay. She was found to be anemic so she was transfused of 1 L RBC, no signs of active bleeding and pt stabilized. Pt was stabilized and d/c back to rehab. Patient reported for reported respiratory distress. Pt found to be on normal vent settings, does have increased  respiratory rate. No further hx can be obtained at this time as pt is nonverbal.  sepsis - shock - chr resp failure - anemia - acute infection - pna - dysphagia - anoxia - dementia     agitated at times - precedex - sedation PRN - monitor for distress -     on ABX  HOB elev  oral hygiene  skin care  assist with ADL  full code - spoke with  - HCP  cx in progress  ABG noted  vent support  monitor VS and HD and Sat  CCM notes reviewed  vent support in progress - not a candidate for weaning  old records reviewed

## 2021-09-29 NOTE — PROGRESS NOTE ADULT - SUBJECTIVE AND OBJECTIVE BOX
[facility] DIVISION of INFECTIOUS DISEASE  Arturo Olivas MD PhD, Haylee Umanzor MD, Andressa Brantley MD, Billy Valenzuela MD  and providing coverage with Alexa Canas MD and Eusebio Chairez MD  Providing Infectious Disease Consultations at [facility], [facility], [facility]'s    Office# [phone] to schedule follow up appointments  Answering Service for urgent calls or New Consults [phone]  Cell# to text for urgent issues Arturo Olivas [phone]     infectious diseases progress note:    BREA BECKHAM is a 75y y. o. Female patient    Patient remains nonverbal on vent    Allergies    codeine (Hives)    Intolerances        ANTIBIOTICS/RELEVANT:  antimicrobials  piperacillin/tazobactam IVPB.. 3.375 Gram(s) IV Intermittent every 8 hours    immunologic:    OTHER:  ALBUTerol    90 MICROgram(s) HFA Inhaler 1 Puff(s) Inhalation every 6 hours PRN  aspirin  chewable 81 milliGRAM(s) Oral daily  bisacodyl Suppository 10 milliGRAM(s) Rectal at bedtime  chlorhexidine 0.12% Liquid 15 milliLiter(s) Oral Mucosa every 12 hours  dexMEDEtomidine Infusion 0.2 MICROgram(s)/kG/Hr IV Continuous <Continuous>  dextrose 40% Gel 15 Gram(s) Oral once  dextrose 5%. 1000 milliLiter(s) IV Continuous <Continuous>  dextrose 5%. 1000 milliLiter(s) IV Continuous <Continuous>  dextrose 50% Injectable 25 Gram(s) IV Push once  dextrose 50% Injectable 12.5 Gram(s) IV Push once  dextrose 50% Injectable 25 Gram(s) IV Push once  fentaNYL   Patch  12 MICROgram(s)/Hr 1 Patch Transdermal every 72 hours  glucagon  Injectable 1 milliGRAM(s) IntraMuscular once  heparin   Injectable 5000 Unit(s) SubCutaneous every 8 hours  insulin glargine Injectable (LANTUS) 36 Unit(s) SubCutaneous at bedtime  insulin lispro (ADMELOG) corrective regimen sliding scale   SubCutaneous every 6 hours  ipratropium 17 MICROgram(s) HFA Inhaler 1 Puff(s) Inhalation every 6 hours  lactobacillus acidophilus 1 Tablet(s) Oral daily  LORazepam     Tablet 1 milliGRAM(s) Oral every 6 hours  LORazepam   Injectable 1 milliGRAM(s) IV Push every 6 hours PRN  methocarbamol 250 milliGRAM(s) Oral two times a day  pantoprazole  Injectable 40 milliGRAM(s) IV Push daily  polyethylene glycol 3350 17 Gram(s) Oral every 12 hours PRN  senna 1 Tablet(s) Oral daily  simethicone 80 milliGRAM(s) Chew two times a day PRN  sucralfate suspension 1 Gram(s) Enteral Tube four times a day      Objective:  Last 24-Vital Signs Last 24 Hrs  T(C): 36.5 (29 Sep 2021 04:00), Max: 37 (28 Sep 2021 12:30)  T(F): 97.7 (29 Sep 2021 04:00), Max: 98.6 (28 Sep 2021 12:30)  HR: 73 (29 Sep 2021 08:00) (65 - 108)  BP: 102/55 (29 Sep 2021 06:00) (97/53 - 160/63)  BP(mean): 71 (29 Sep 2021 06:00) (66 - 93)  RR: 17 (29 Sep 2021 06:00) (16 - 32)  SpO2: 95% (29 Sep 2021 08:00) (93% - 100%)    T(C): 36.5 (09-29-21 @ 04:00), Max: 37.1 (09-27-21 @ 12:56)  T(F): 97.7 (09-29-21 @ 04:00), Max: 98.8 (09-27-21 @ 12:56)  T(C): 36.5 (09-29-21 @ 04:00), Max: 37.1 (09-27-21 @ 08:22)  T(F): 97.7 (09-29-21 @ 04:00), Max: 98.8 (09-27-21 @ 12:56)  T(C): 36.5 (09-29-21 @ 04:00), Max: 38.1 (09-25-21 @ 14:29)  T(F): 97.7 (09-29-21 @ 04:00), Max: 100.6 (09-25-21 @ 14:29)    PHYSICAL EXAM:  Constitutional: Well-developed, well nourished  Eyes: PERRLA, EOMI  Ear/Nose/Throat: oropharynx normal	  Neck: no JVD, no lymphadenopathy, supple, intubated via trch  Respiratory: no accessory muscle use, lung fields bilaterally clear, some upper airway wounds  Cardiovascular: distant  Gastrointestinal: soft, NT, no HSM, BS-normal  Extremities: no clubbing, no cyanosis, edema absent  Neuro: patient alert,    Mode: PRVC, RR (machine): 18, TV (machine): 400, FiO2: 40, PEEP: 8, ITime: 1, MAP: 15, PIP: 26    LABS:                        7.5    7.44  )-----------( 346      ( 29 Sep 2021 07:08 )             25.1       WBC 7.44  09-29 @ 07:08  WBC 7.82  09-27 @ 12:08  WBC 6.92  09-27 @ 05:45  WBC 9.62  09-26 @ 06:41  WBC 13.61  09-25 @ 15:01      09-29    141  |  106  |  13  ----------------------------<  103<H>  3.8   |  26  |  0.92    Ca    8.5      29 Sep 2021 07:08    TPro  6.3  /  Alb  2.0<L>  /  TBili  0.4  /  DBili  x   /  AST  10  /  ALT  18  /  AlkPhos  106  09-29      Creatinine, Serum: 0.92 mg/dL (09-29-21 @ 07:08)  Creatinine, Serum: 0.93 mg/dL (09-27-21 @ 05:45)  Creatinine, Serum: 1.00 mg/dL (09-26-21 @ 06:41)  Creatinine, Serum: 1.31 mg/dL (09-25-21 @ 15:02)                MICROBIOLOGY:              RADIOLOGY & ADDITIONAL STUDIES:   [facility] DIVISION of INFECTIOUS DISEASE  Arturo Olivas MD PhD, Haylee Umanzor MD, Andressa Brantley MD, Billy Valenzuela MD  and providing coverage with Alexa Canas MD and Eusebio Chairez MD  Providing Infectious Disease Consultations at [facility], [facility], [facility]    Office# [phone] to schedule follow up appointments  Answering Service for urgent calls or New Consults [phone]  Cell# to text for urgent issues Arturo Olivas [phone]     infectious diseases progress note:    BREA BECKHAM is a 75y y. o. Female patient    Patient remains nonverbal on vent    Allergies    codeine (Hives)    Intolerances        ANTIBIOTICS/RELEVANT:  antimicrobials  piperacillin/tazobactam IVPB.. 3.375 Gram(s) IV Intermittent every 8 hours    immunologic:    OTHER:  ALBUTerol    90 MICROgram(s) HFA Inhaler 1 Puff(s) Inhalation every 6 hours PRN  aspirin  chewable 81 milliGRAM(s) Oral daily  bisacodyl Suppository 10 milliGRAM(s) Rectal at bedtime  chlorhexidine 0.12% Liquid 15 milliLiter(s) Oral Mucosa every 12 hours  dexMEDEtomidine Infusion 0.2 MICROgram(s)/kG/Hr IV Continuous <Continuous>  dextrose 40% Gel 15 Gram(s) Oral once  dextrose 5%. 1000 milliLiter(s) IV Continuous <Continuous>  dextrose 5%. 1000 milliLiter(s) IV Continuous <Continuous>  dextrose 50% Injectable 25 Gram(s) IV Push once  dextrose 50% Injectable 12.5 Gram(s) IV Push once  dextrose 50% Injectable 25 Gram(s) IV Push once  fentaNYL   Patch  12 MICROgram(s)/Hr 1 Patch Transdermal every 72 hours  glucagon  Injectable 1 milliGRAM(s) IntraMuscular once  heparin   Injectable 5000 Unit(s) SubCutaneous every 8 hours  insulin glargine Injectable (LANTUS) 36 Unit(s) SubCutaneous at bedtime  insulin lispro (ADMELOG) corrective regimen sliding scale   SubCutaneous every 6 hours  ipratropium 17 MICROgram(s) HFA Inhaler 1 Puff(s) Inhalation every 6 hours  lactobacillus acidophilus 1 Tablet(s) Oral daily  LORazepam     Tablet 1 milliGRAM(s) Oral every 6 hours  LORazepam   Injectable 1 milliGRAM(s) IV Push every 6 hours PRN  methocarbamol 250 milliGRAM(s) Oral two times a day  pantoprazole  Injectable 40 milliGRAM(s) IV Push daily  polyethylene glycol 3350 17 Gram(s) Oral every 12 hours PRN  senna 1 Tablet(s) Oral daily  simethicone 80 milliGRAM(s) Chew two times a day PRN  sucralfate suspension 1 Gram(s) Enteral Tube four times a day      Objective:  Last 24-Vital Signs Last 24 Hrs  T(C): 36.5 (29 Sep 2021 04:00), Max: 37 (28 Sep 2021 12:30)  T(F): 97.7 (29 Sep 2021 04:00), Max: 98.6 (28 Sep 2021 12:30)  HR: 73 (29 Sep 2021 08:00) (65 - 108)  BP: 102/55 (29 Sep 2021 06:00) (97/53 - 160/63)  BP(mean): 71 (29 Sep 2021 06:00) (66 - 93)  RR: 17 (29 Sep 2021 06:00) (16 - 32)  SpO2: 95% (29 Sep 2021 08:00) (93% - 100%)    T(C): 36.5 (09-29-21 @ 04:00), Max: 37.1 (09-27-21 @ 12:56)  T(F): 97.7 (09-29-21 @ 04:00), Max: 98.8 (09-27-21 @ 12:56)  T(C): 36.5 (09-29-21 @ 04:00), Max: 37.1 (09-27-21 @ 08:22)  T(F): 97.7 (09-29-21 @ 04:00), Max: 98.8 (09-27-21 @ 12:56)  T(C): 36.5 (09-29-21 @ 04:00), Max: 38.1 (09-25-21 @ 14:29)  T(F): 97.7 (09-29-21 @ 04:00), Max: 100.6 (09-25-21 @ 14:29)    PHYSICAL EXAM:  Constitutional: Well-developed, well nourished  Eyes: PERRLA, EOMI  Ear/Nose/Throat: oropharynx normal	  Neck: no JVD, no lymphadenopathy, supple, intubated via trch  Respiratory: no accessory muscle use, lung fields bilaterally clear, some upper airway wounds  Cardiovascular: distant  Gastrointestinal: soft, NT, no HSM, BS-normal  Extremities: no clubbing, no cyanosis, edema absent  Neuro: patient alert,    Mode: PRVC, RR (machine): 18, TV (machine): 400, FiO2: 40, PEEP: 8, ITime: 1, MAP: 15, PIP: 26    LABS:                        7.5    7.44  )-----------( 346      ( 29 Sep 2021 07:08 )             25.1       WBC 7.44  09-29 @ 07:08  WBC 7.82  09-27 @ 12:08  WBC 6.92  09-27 @ 05:45  WBC 9.62  09-26 @ 06:41  WBC 13.61  09-25 @ 15:01      09-29    141  |  106  |  13  ----------------------------<  103<H>  3.8   |  26  |  0.92    Ca    8.5      29 Sep 2021 07:08    TPro  6.3  /  Alb  2.0<L>  /  TBili  0.4  /  DBili  x   /  AST  10  /  ALT  18  /  AlkPhos  106  09-29      Creatinine, Serum: 0.92 mg/dL (09-29-21 @ 07:08)  Creatinine, Serum: 0.93 mg/dL (09-27-21 @ 05:45)  Creatinine, Serum: 1.00 mg/dL (09-26-21 @ 06:41)  Creatinine, Serum: 1.31 mg/dL (09-25-21 @ 15:02)                MICROBIOLOGY:    Culture - Sputum . (09.26.21 @ 12:50)    Gram Stain:   Few polymorphonuclear leukocytes per low power field  Rare Squamous epithelial cells per low power field  Few Gram Negative Rods per oil power field    -  Amikacin: S <=16    -  Amikacin: S <=16    -  Amoxicillin/Clavulanic Acid: R >16/8    -  Ampicillin: R >16 These ampicillin results predict results for amoxicillin    -  Ampicillin/Sulbactam: R >16/8 Enterobacter, Citrobacter, and Serratia may develop resistance during prolonged therapy (3-4 days)    -  Aztreonam: S <=4    -  Aztreonam: S <=4    -  Cefazolin: R >16 Enterobacter, Citrobacter, and Serratia may develop resistance during prolonged therapy (3-4 days)    -  Cefepime: S <=2    -  Cefepime: S 4    -  Cefoxitin: R <=8    -  Ceftazidime: S 8    -  Ceftazidime: R >16    -  Ceftriaxone: I 2 Enterobacter, Citrobacter, and Serratia may develop resistance during prolonged therapy    -  Ciprofloxacin: R >2    -  Ciprofloxacin: S <=0.25    -  Ertapenem: S <=0.5    -  Gentamicin: S <=2    -  Gentamicin: S <=2    -  Imipenem: R >8    -  Levofloxacin: S <=0.5    -  Levofloxacin: S 1    -  Levofloxacin: R 2    -  Meropenem: S <=1    -  Meropenem: I 4    -  Piperacillin/Tazobactam: S <=8    -  Piperacillin/Tazobactam: S <=8    -  Tobramycin: S <=2    -  Tobramycin: S <=2    -  Trimethoprim/Sulfamethoxazole: S <=0.5/9.5    -  Trimethoprim/Sulfamethoxazole: S <=0.5/9.5    Specimen Source: .Sputum Sputum    Culture Results:   Moderate Pseudomonas aeruginosa (Carbapenem Resistant)  Moderate Serratia marcescens  Moderate Stenotrophomonas maltophilia  Normal Respiratory Elvira absent    Organism Identification: Pseudomonas aeruginosa (Carbapenem Resistant)  Serratia marcescens  Stenotrophomonas maltophilia    Organism: Pseudomonas aeruginosa (Carbapenem Resistant)    Organism: Serratia marcescens    Organism: Stenotrophomonas maltophilia    Method Type: PRATIK    Method Type: PRATIK    Method Type: PRATIK        RADIOLOGY & ADDITIONAL STUDIES:

## 2021-09-29 NOTE — PROGRESS NOTE ADULT - PROBLEM SELECTOR PLAN 1
recurrent, versus vent associated PNA, start on aspiration precautions, patient was started on Zosyn   vanco dc'ed.    ID recs appreciated  Continue on pip-tazo (started late 9/25) so anticipate 5 days with last full day 9/30 but may extend based on sputum micro, clinical response and risk benefit of extended abx.  sputum cultue peudomonas and serratia , d/w ID continue present management.

## 2021-09-29 NOTE — PROGRESS NOTE ADULT - SUBJECTIVE AND OBJECTIVE BOX
Date/Time Patient Seen:  		  Referring MD:   Data Reviewed	       Patient is a 75y old  Female who presents with a chief complaint of Respiratory distress. (28 Sep 2021 17:14)      Subjective/HPI     PAST MEDICAL & SURGICAL HISTORY:  Dementia of frontal lobe type    Aphasic stroke    Diabetes mellitus    Respiratory failure    Hypertension    GERD (gastroesophageal reflux disease)    Constipation    Respiratory failure    CVA (cerebral vascular accident)    HTN (hypertension)    DM (diabetes mellitus)    Advanced dementia    COVID-19 virus detected    Quadriplegia    Pneumonia    Type II diabetes mellitus    Hx of appendectomy    Gastrostomy in place    Tracheostomy in place    Tracheostomy tube present    Feeding by G-tube          Medication list         MEDICATIONS  (STANDING):  aspirin  chewable 81 milliGRAM(s) Oral daily  bisacodyl Suppository 10 milliGRAM(s) Rectal at bedtime  chlorhexidine 0.12% Liquid 15 milliLiter(s) Oral Mucosa every 12 hours  dexMEDEtomidine Infusion 0.2 MICROgram(s)/kG/Hr (3.06 mL/Hr) IV Continuous <Continuous>  dextrose 40% Gel 15 Gram(s) Oral once  dextrose 5%. 1000 milliLiter(s) (50 mL/Hr) IV Continuous <Continuous>  dextrose 5%. 1000 milliLiter(s) (100 mL/Hr) IV Continuous <Continuous>  dextrose 50% Injectable 25 Gram(s) IV Push once  dextrose 50% Injectable 12.5 Gram(s) IV Push once  dextrose 50% Injectable 25 Gram(s) IV Push once  fentaNYL   Patch  12 MICROgram(s)/Hr 1 Patch Transdermal every 72 hours  glucagon  Injectable 1 milliGRAM(s) IntraMuscular once  heparin   Injectable 5000 Unit(s) SubCutaneous every 8 hours  insulin glargine Injectable (LANTUS) 36 Unit(s) SubCutaneous at bedtime  insulin lispro (ADMELOG) corrective regimen sliding scale   SubCutaneous every 6 hours  ipratropium 17 MICROgram(s) HFA Inhaler 1 Puff(s) Inhalation every 6 hours  lactobacillus acidophilus 1 Tablet(s) Oral daily  LORazepam     Tablet 1 milliGRAM(s) Oral every 6 hours  methocarbamol 250 milliGRAM(s) Oral two times a day  pantoprazole  Injectable 40 milliGRAM(s) IV Push daily  piperacillin/tazobactam IVPB.. 3.375 Gram(s) IV Intermittent every 8 hours  senna 1 Tablet(s) Oral daily  sucralfate suspension 1 Gram(s) Enteral Tube four times a day    MEDICATIONS  (PRN):  ALBUTerol    90 MICROgram(s) HFA Inhaler 1 Puff(s) Inhalation every 6 hours PRN Shortness of Breath and/or Wheezing  LORazepam   Injectable 1 milliGRAM(s) IV Push every 6 hours PRN agtation and vent dyssynchrony  polyethylene glycol 3350 17 Gram(s) Oral every 12 hours PRN Constipation  simethicone 80 milliGRAM(s) Chew two times a day PRN Dyspepsia         Vitals log        ICU Vital Signs Last 24 Hrs  T(C): 36.5 (29 Sep 2021 04:00), Max: 37 (28 Sep 2021 12:30)  T(F): 97.7 (29 Sep 2021 04:00), Max: 98.6 (28 Sep 2021 12:30)  HR: 65 (29 Sep 2021 06:00) (65 - 108)  BP: 102/55 (29 Sep 2021 06:00) (97/53 - 160/63)  BP(mean): 71 (29 Sep 2021 06:00) (66 - 93)  ABP: --  ABP(mean): --  RR: 17 (29 Sep 2021 06:00) (16 - 32)  SpO2: 100% (29 Sep 2021 06:00) (93% - 100%)       Mode: PRVC  RR (machine): 18  TV (machine): 400  FiO2: 40  PEEP: 8  ITime: 1  MAP: 16  PIP: 32      Input and Output:  I&O's Detail    27 Sep 2021 07:01  -  28 Sep 2021 07:00  --------------------------------------------------------  IN:    Enteral Tube Flush: 525 mL    IV PiggyBack: 100 mL    Jevity 1.5: 630 mL    Lactated Ringers: 500 mL  Total IN: 1755 mL    OUT:    Voided (mL): 800 mL  Total OUT: 800 mL    Total NET: 955 mL      28 Sep 2021 07:01  -  29 Sep 2021 06:38  --------------------------------------------------------  IN:    Enteral Tube Flush: 400 mL    Free Water: 400 mL    IV PiggyBack: 200 mL    Jevity 1.5: 720 mL  Total IN: 1720 mL    OUT:    Voided (mL): 1000 mL  Total OUT: 1000 mL    Total NET: 720 mL          Lab Data                        8.4    7.82  )-----------( 286      ( 27 Sep 2021 12:08 )             28.3                   Review of Systems	      Objective     Physical Examination    heart s1s2  lung dec BS  abd soft  head nc      Pertinent Lab findings & Imaging      Annalise:  NO   Adequate UO     I&O's Detail    27 Sep 2021 07:01  -  28 Sep 2021 07:00  --------------------------------------------------------  IN:    Enteral Tube Flush: 525 mL    IV PiggyBack: 100 mL    Jevity 1.5: 630 mL    Lactated Ringers: 500 mL  Total IN: 1755 mL    OUT:    Voided (mL): 800 mL  Total OUT: 800 mL    Total NET: 955 mL      28 Sep 2021 07:01  -  29 Sep 2021 06:38  --------------------------------------------------------  IN:    Enteral Tube Flush: 400 mL    Free Water: 400 mL    IV PiggyBack: 200 mL    Jevity 1.5: 720 mL  Total IN: 1720 mL    OUT:    Voided (mL): 1000 mL  Total OUT: 1000 mL    Total NET: 720 mL               Discussed with:     Cultures:	        Radiology

## 2021-09-29 NOTE — PROGRESS NOTE ADULT - SUBJECTIVE AND OBJECTIVE BOX
Patient is a 75y old  Female who presents with a chief complaint of Respiratory distress. (29 Sep 2021 08:48)      INTERVAL HPI/OVERNIGHT EVENTS:  Pt is seen and examined.  unable to obtain any history due to underlying medical condition.    Pain Location & Control:     MEDICATIONS  (STANDING):  aspirin  chewable 81 milliGRAM(s) Oral daily  bisacodyl Suppository 10 milliGRAM(s) Rectal at bedtime  chlorhexidine 0.12% Liquid 15 milliLiter(s) Oral Mucosa every 12 hours  dexMEDEtomidine Infusion 0.2 MICROgram(s)/kG/Hr (3.06 mL/Hr) IV Continuous <Continuous>  dextrose 40% Gel 15 Gram(s) Oral once  dextrose 5%. 1000 milliLiter(s) (50 mL/Hr) IV Continuous <Continuous>  dextrose 5%. 1000 milliLiter(s) (100 mL/Hr) IV Continuous <Continuous>  dextrose 50% Injectable 25 Gram(s) IV Push once  dextrose 50% Injectable 12.5 Gram(s) IV Push once  dextrose 50% Injectable 25 Gram(s) IV Push once  fentaNYL   Patch  12 MICROgram(s)/Hr 1 Patch Transdermal every 72 hours  glucagon  Injectable 1 milliGRAM(s) IntraMuscular once  heparin   Injectable 5000 Unit(s) SubCutaneous every 8 hours  insulin glargine Injectable (LANTUS) 36 Unit(s) SubCutaneous at bedtime  insulin lispro (ADMELOG) corrective regimen sliding scale   SubCutaneous every 6 hours  ipratropium 17 MICROgram(s) HFA Inhaler 1 Puff(s) Inhalation every 6 hours  lactobacillus acidophilus 1 Tablet(s) Oral daily  LORazepam     Tablet 1 milliGRAM(s) Oral every 6 hours  methocarbamol 250 milliGRAM(s) Oral two times a day  pantoprazole  Injectable 40 milliGRAM(s) IV Push daily  piperacillin/tazobactam IVPB.. 3.375 Gram(s) IV Intermittent every 8 hours  senna 1 Tablet(s) Oral daily  sucralfate suspension 1 Gram(s) Enteral Tube four times a day    MEDICATIONS  (PRN):  ALBUTerol    90 MICROgram(s) HFA Inhaler 1 Puff(s) Inhalation every 6 hours PRN Shortness of Breath and/or Wheezing  LORazepam   Injectable 1 milliGRAM(s) IV Push every 6 hours PRN agtation and vent dyssynchrony  polyethylene glycol 3350 17 Gram(s) Oral every 12 hours PRN Constipation  simethicone 80 milliGRAM(s) Chew two times a day PRN Dyspepsia      Allergies    codeine (Hives)    Intolerances            Vital Signs Last 24 Hrs  T(C): 36.7 (29 Sep 2021 08:15), Max: 37 (28 Sep 2021 12:30)  T(F): 98 (29 Sep 2021 08:15), Max: 98.6 (28 Sep 2021 12:30)  HR: 69 (29 Sep 2021 10:52) (65 - 108)  BP: 115/53 (29 Sep 2021 08:00) (97/53 - 160/63)  BP(mean): 72 (29 Sep 2021 08:00) (66 - 93)  RR: 18 (29 Sep 2021 08:00) (16 - 32)  SpO2: 98% (29 Sep 2021 10:52) (94% - 100%)        LABS:                        7.5    7.44  )-----------( 346      ( 29 Sep 2021 07:08 )             25.1     29 Sep 2021 07:08    141    |  106    |  13     ----------------------------<  103    3.8     |  26     |  0.92     Ca    8.5        29 Sep 2021 07:08    TPro  6.3    /  Alb  2.0    /  TBili  0.4    /  DBili  x      /  AST  10     /  ALT  18     /  AlkPhos  106    29 Sep 2021 07:08        CAPILLARY BLOOD GLUCOSE      POCT Blood Glucose.: 118 mg/dL (29 Sep 2021 11:25)  POCT Blood Glucose.: 108 mg/dL (29 Sep 2021 05:09)  POCT Blood Glucose.: 157 mg/dL (28 Sep 2021 23:45)  POCT Blood Glucose.: 113 mg/dL (28 Sep 2021 17:15)        Cultures  Culture Results:   Moderate Pseudomonas aeruginosa (Carbapenem Resistant)  Moderate Serratia marcescens  Moderate Stenotrophomonas maltophilia  Normal Respiratory Elvira absent (09-26 @ 12:50)  Culture Results:   No growth to date. (09-25 @ 21:55)  Culture Results:   No growth to date. (09-25 @ 21:55)  Culture Results:   >100,000 CFU/ml Proteus mirabilis (09-25 @ 21:55)        Culture - Sputum (collected 09-26-21 @ 12:50)  Source: .Sputum Sputum  Gram Stain (09-26-21 @ 15:43):    Few polymorphonuclear leukocytes per low power field    Rare Squamous epithelial cells per low power field    Few Gram Negative Rods per oil power field  Final Report (09-28-21 @ 14:54):    Moderate Pseudomonas aeruginosa (Carbapenem Resistant)    Moderate Serratia marcescens    Moderate Stenotrophomonas maltophilia    Normal Respiratory Elvira absent  Organism: Pseudomonas aeruginosa (Carbapenem Resistant)  Serratia marcescens  Stenotrophomonas maltophilia (09-28-21 @ 14:54)  Organism: Stenotrophomonas maltophilia (09-28-21 @ 14:54)      -  Ceftazidime: R >16      -  Levofloxacin: S 1      -  Trimethoprim/Sulfamethoxazole: S <=0.5/9.5      Method Type: PRATIK  Organism: Serratia marcescens (09-28-21 @ 14:54)      -  Amikacin: S <=16      -  Amoxicillin/Clavulanic Acid: R >16/8      -  Ampicillin: R >16 These ampicillin results predict results for amoxicillin      -  Ampicillin/Sulbactam: R >16/8 Enterobacter, Citrobacter, and Serratia may develop resistance during prolonged therapy (3-4 days)      -  Aztreonam: S <=4      -  Cefazolin: R >16 Enterobacter, Citrobacter, and Serratia may develop resistance during prolonged therapy (3-4 days)      -  Cefepime: S <=2      -  Cefoxitin: R <=8      -  Ceftriaxone: I 2 Enterobacter, Citrobacter, and Serratia may develop resistance during prolonged therapy      -  Ciprofloxacin: R >2      -  Ertapenem: S <=0.5      -  Gentamicin: S <=2      -  Levofloxacin: R 2      -  Meropenem: S <=1      -  Piperacillin/Tazobactam: S <=8      -  Tobramycin: S <=2      -  Trimethoprim/Sulfamethoxazole: S <=0.5/9.5      Method Type: PRATIK  Organism: Pseudomonas aeruginosa (Carbapenem Resistant) (09-28-21 @ 14:54)      -  Amikacin: S <=16      -  Aztreonam: S <=4      -  Cefepime: S 4      -  Ceftazidime: S 8      -  Ciprofloxacin: S <=0.25      -  Gentamicin: S <=2      -  Imipenem: R >8      -  Levofloxacin: S <=0.5      -  Meropenem: I 4      -  Piperacillin/Tazobactam: S <=8      -  Tobramycin: S <=2      Method Type: PRATIK    Culture - Urine (collected 09-25-21 @ 21:55)  Source: Clean Catch Clean Catch (Midstream)  Preliminary Report (09-28-21 @ 15:44):    >100,000 CFU/ml Proteus mirabilis    Culture - Blood (collected 09-25-21 @ 21:55)  Source: .Blood Blood-Peripheral  Preliminary Report (09-26-21 @ 22:01):    No growth to date.    Culture - Blood (collected 09-25-21 @ 21:55)  Source: .Blood Blood-Peripheral  Preliminary Report (09-26-21 @ 22:01):    No growth to date.        RADIOLOGY & ADDITIONAL TESTS:    Imaging Personally Reviewed:  [ ] YES  [ ] NO    Consultant(s) Notes Reviewed:  [ ] YES  [ ] NO    Care Discussed with Consultants/Other Providers [x ] YES  [ ] NO

## 2021-09-29 NOTE — PROGRESS NOTE ADULT - ASSESSMENT
75 year old female with a history of HTN, DM, CVA, dementia, chronic respiratory failure s/p trach, PEG, recent cardiac arrest who presents with acute on chronic respiratory failure. The patient is unable to provide any history to me due to advanced dementia and trach. She was found to have respiratory distress at NH so she was sent to ED. Hypoxic in NH was titrated up to 100%. Pt had been agitated in ER was given 0.5 ativan in ER. Pt agitation resolved   Pt trach to vent at this time with new seemingly aspiration/multifacial PNA. Pt in Er was normotensive then recently began to drop BP, additional fluid bolus was given and will start on levophed to maintain MAP>65       ACUTE RENAL FAILURE: lactated ringers. 1000 milliLiter(s) (80 mL/Hr) IV   Serum creatinine is 0.93   There is no progression . No uremic symptoms  No evidence of anemia .  Fluid status stable.  Will continue to avoid nephrotoxic drugs.  Patient remains asymptomatic.   Continue current therapy.      Admit for septic workup and ID evaluation,send blood and urine cx,serial lactate levels,monitor vitals roddy raymundo hydration,monitor urine output and renal profile,iv abx as per id cons  piperacillin/tazobactam IVPB.. 3.375 Gram(s) IV Intermittent every 8 hours

## 2021-09-29 NOTE — PROGRESS NOTE ADULT - SUBJECTIVE AND OBJECTIVE BOX
BREA BECKHAM    [facility]    Allergies    codeine (Hives)    Intolerances        PAST MEDICAL & SURGICAL HISTORY:  Dementia of frontal lobe type    Aphasic stroke    Diabetes mellitus    Respiratory failure    Hypertension    GERD (gastroesophageal reflux disease)    Constipation    Respiratory failure    CVA (cerebral vascular accident)    HTN (hypertension)    DM (diabetes mellitus)    Advanced dementia    COVID-19 virus detected    Quadriplegia    Pneumonia    Type II diabetes mellitus    Hx of appendectomy    Gastrostomy in place    Tracheostomy in place    Tracheostomy tube present    Feeding by G-tube        FAMILY HISTORY:  No pertinent family history in first degree relatives        Home Medications:  acetaminophen 160 mg/5 mL oral suspension: 20.31 milliliter(s) by gastrostomy tube every 6 hours, As Needed (23 Jun 2021 09:08)  albuterol 90 mcg/inh inhalation aerosol: 2 puff(s) inhaled every 6 hours (23 Jun 2021 09:08)  aspirin 81 mg oral tablet, chewable: 1 tab(s) by PEG tube once a day (15 Zay 2021 15:07)  Bacid (LAC) oral tablet: 2 tab(s) by gastrostomy tube once a day (23 Jun 2021 09:08)  bisacodyl 5 mg oral delayed release tablet: 1 tab(s) orally once a day (at bedtime) (23 Jun 2021 09:08)  Carafate 1 g/10 mL oral suspension: 10 milliliter(s) by gastrostomy tube 4 times a day (before meals and at bedtime) for 14 days (Started 6/4/21) (15 Zay 2021 15:07)  chlorhexidine 0.12% mucous membrane liquid: 15 milliliter(s) mucous membrane 2 times a day (15 Sep 2021 12:08)  insulin glargine: 36 unit(s) subcutaneous once a day (at bedtime) (15 Sep 2021 12:08)  ipratropium-albuterol 0.5 mg-2.5 mg/3 mL inhalation solution: 3 milliliter(s) inhaled 4 times a day (15 Zay 2021 15:07)  LORazepam 0.5 mg oral tablet: 1 tab(s) orally every 2 hours, As needed, Agitation (15 Sep 2021 12:08)  LORazepam 1 mg oral tablet: 1 tab(s) orally every 6 hours (15 Sep 2021 12:08)  methocarbamol: 250 milligram(s) by gastrostomy tube 2 times a day (15 Sep 2021 12:08)  pantoprazole 40 mg oral granule, delayed release: 40 milligram(s) by gastrostomy tube 2 times a day (15 Sep 2021 12:08)  polyethylene glycol 3350 oral powder for reconstitution: 17 gram(s) orally every 12 hours (23 Jun 2021 09:08)  senna 8.6 mg oral tablet: 1 tab(s) by gastrostomy tube once a day (at bedtime) (23 Jun 2021 09:08)  simethicone 80 mg oral tablet, chewable: 1 tab(s) orally every 6 hours (15 Sep 2021 12:08)      MEDICATIONS  (STANDING):  aspirin  chewable 81 milliGRAM(s) Oral daily  bisacodyl Suppository 10 milliGRAM(s) Rectal at bedtime  chlorhexidine 0.12% Liquid 15 milliLiter(s) Oral Mucosa every 12 hours  dexMEDEtomidine Infusion 0.2 MICROgram(s)/kG/Hr (3.06 mL/Hr) IV Continuous <Continuous>  dextrose 40% Gel 15 Gram(s) Oral once  dextrose 5%. 1000 milliLiter(s) (50 mL/Hr) IV Continuous <Continuous>  dextrose 5%. 1000 milliLiter(s) (100 mL/Hr) IV Continuous <Continuous>  dextrose 50% Injectable 25 Gram(s) IV Push once  dextrose 50% Injectable 12.5 Gram(s) IV Push once  dextrose 50% Injectable 25 Gram(s) IV Push once  fentaNYL   Patch  12 MICROgram(s)/Hr 1 Patch Transdermal every 72 hours  glucagon  Injectable 1 milliGRAM(s) IntraMuscular once  heparin   Injectable 5000 Unit(s) SubCutaneous every 8 hours  insulin glargine Injectable (LANTUS) 36 Unit(s) SubCutaneous at bedtime  insulin lispro (ADMELOG) corrective regimen sliding scale   SubCutaneous every 6 hours  ipratropium 17 MICROgram(s) HFA Inhaler 1 Puff(s) Inhalation every 6 hours  lactobacillus acidophilus 1 Tablet(s) Oral daily  LORazepam     Tablet 1 milliGRAM(s) Oral every 6 hours  methocarbamol 250 milliGRAM(s) Oral two times a day  pantoprazole  Injectable 40 milliGRAM(s) IV Push daily  piperacillin/tazobactam IVPB.. 3.375 Gram(s) IV Intermittent every 8 hours  senna 1 Tablet(s) Oral daily  sucralfate suspension 1 Gram(s) Enteral Tube four times a day    MEDICATIONS  (PRN):  ALBUTerol    90 MICROgram(s) HFA Inhaler 1 Puff(s) Inhalation every 6 hours PRN Shortness of Breath and/or Wheezing  LORazepam   Injectable 1 milliGRAM(s) IV Push every 6 hours PRN agtation and vent dyssynchrony  polyethylene glycol 3350 17 Gram(s) Oral every 12 hours PRN Constipation  simethicone 80 milliGRAM(s) Chew two times a day PRN Dyspepsia      Diet, NPO with Tube Feed:   Tube Feeding Modality: Gastrostomy  Jevity 1.5 Horacio  Total Volume for 24 Hours (mL): 720  Continuous  Starting Tube Feed Rate mL per Hour: 30  Until Goal Tube Feed Rate (mL per Hour): 30  Tube Feed Duration (in Hours): 24  Tube Feed Start Time: 19:00 (09-26-21 @ 18:08) [Active]          Vital Signs Last 24 Hrs  T(C): 36.5 (29 Sep 2021 04:00), Max: 37 (28 Sep 2021 12:30)  T(F): 97.7 (29 Sep 2021 04:00), Max: 98.6 (28 Sep 2021 12:30)  HR: 73 (29 Sep 2021 08:00) (65 - 108)  BP: 115/53 (29 Sep 2021 08:00) (97/53 - 160/63)  BP(mean): 72 (29 Sep 2021 08:00) (66 - 93)  RR: 18 (29 Sep 2021 08:00) (16 - 32)  SpO2: 95% (29 Sep 2021 08:00) (93% - 100%)      09-28-21 @ 07:01  -  09-29-21 @ 07:00  --------------------------------------------------------  IN: 1720 mL / OUT: 1000 mL / NET: 720 mL        Mode: PRVC, RR (machine): 18, TV (machine): 400, FiO2: 40, PEEP: 8, ITime: 1, MAP: 15, PIP: 26      LABS:                        7.5    7.44  )-----------( 346      ( 29 Sep 2021 07:08 )             25.1     09-29    141  |  106  |  13  ----------------------------<  103<H>  3.8   |  26  |  0.92    Ca    8.5      29 Sep 2021 07:08    TPro  6.3  /  Alb  2.0<L>  /  TBili  0.4  /  DBili  x   /  AST  10  /  ALT  18  /  AlkPhos  106  09-29              WBC:  WBC Count: 7.44 K/uL (09-29 @ 07:08)  WBC Count: 7.82 K/uL (09-27 @ 12:08)  WBC Count: 6.92 K/uL (09-27 @ 05:45)  WBC Count: 9.62 K/uL (09-26 @ 06:41)  WBC Count: 13.61 K/uL (09-25 @ 15:01)      MICROBIOLOGY:  RECENT CULTURES:  09-26 .Sputum Sputum Pseudomonas aeruginosa (Carbapenem Resistant)  Serratia marcescens  Stenotrophomonas maltophilia   Few polymorphonuclear leukocytes per low power field  Rare Squamous epithelial cells per low power field  Few Gram Negative Rods per oil power field   Moderate Pseudomonas aeruginosa (Carbapenem Resistant)  Moderate Serratia marcescens  Moderate Stenotrophomonas maltophilia  Normal Respiratory Elvira absent    09-25 .Blood Blood-Peripheral XXXX XXXX   No growth to date.                    Sodium:  Sodium, Serum: 141 mmol/L (09-29 @ 07:08)  Sodium, Serum: 140 mmol/L (09-27 @ 05:45)  Sodium, Serum: 142 mmol/L (09-26 @ 06:41)  Sodium, Serum: 137 mmol/L (09-25 @ 15:02)      0.92 mg/dL 09-29 @ 07:08  0.93 mg/dL 09-27 @ 05:45  1.00 mg/dL 09-26 @ 06:41  1.31 mg/dL 09-25 @ 15:02      Hemoglobin:  Hemoglobin: 7.5 g/dL (09-29 @ 07:08)  Hemoglobin: 8.4 g/dL (09-27 @ 12:08)  Hemoglobin: 7.7 g/dL (09-27 @ 05:45)  Hemoglobin: 8.6 g/dL (09-26 @ 12:00)  Hemoglobin: 9.1 g/dL (09-26 @ 06:41)  Hemoglobin: 10.4 g/dL (09-25 @ 15:01)      Platelets: Platelet Count - Automated: 346 K/uL (09-29 @ 07:08)  Platelet Count - Automated: 286 K/uL (09-27 @ 12:08)  Platelet Count - Automated: 342 K/uL (09-27 @ 05:45)  Platelet Count - Automated: 424 K/uL (09-26 @ 06:41)  Platelet Count - Automated: 435 K/uL (09-25 @ 15:01)      LIVER FUNCTIONS - ( 29 Sep 2021 07:08 )  Alb: 2.0 g/dL / Pro: 6.3 g/dL / ALK PHOS: 106 U/L / ALT: 18 U/L DA / AST: 10 U/L / GGT: x                 RADIOLOGY & ADDITIONAL STUDIES:      MICROBIOLOGY:  RECENT CULTURES:  09-26 .Sputum Sputum Pseudomonas aeruginosa (Carbapenem Resistant)  Serratia marcescens  Stenotrophomonas maltophilia   Few polymorphonuclear leukocytes per low power field  Rare Squamous epithelial cells per low power field  Few Gram Negative Rods per oil power field   Moderate Pseudomonas aeruginosa (Carbapenem Resistant)  Moderate Serratia marcescens  Moderate Stenotrophomonas maltophilia  Normal Respiratory Elvira absent    09-25 .Blood Blood-Peripheral XXXX XXXX   No growth to date.

## 2021-09-29 NOTE — PROGRESS NOTE ADULT - ASSESSMENT
The patient is a 75 year old female with a history of HTN, DM, CVA, dementia, chronic respiratory failure s/p trach, PEG, recent cardiac arrest who presents with acute on chronic respiratory failure.     Plan:  - CXR and CT chest with bilateral infiltrates, left greater than right, and bilateral pleural effusions  - Echo last admission with normal LV systolic function, mild pulm HTN  - Aspirin on hold due to prior anemia and bleed  - On zosyn  - Mechanical ventilation  - Overall poor prognosis - comfort care would be most appropriate

## 2021-09-29 NOTE — PROVIDER CONTACT NOTE (CRITICAL VALUE NOTIFICATION) - SITUATION
Positive sputum culture, pseudomonas aeruginosa, Carbapenem resistant, moderate serratia marcescens, moderate Stenotrophomonas maltophilia, normal respiratory alejandro absent

## 2021-09-29 NOTE — PROGRESS NOTE ADULT - SUBJECTIVE AND OBJECTIVE BOX
Chief Complaint: Respiratory distress    Interval Events: No events overnight.    Review of Systems:  Unable to obtain    Physical Exam:  Vital Signs Last 24 Hrs  T(C): 36.5 (29 Sep 2021 04:00), Max: 37 (28 Sep 2021 12:30)  T(F): 97.7 (29 Sep 2021 04:00), Max: 98.6 (28 Sep 2021 12:30)  HR: 73 (29 Sep 2021 08:00) (65 - 108)  BP: 102/55 (29 Sep 2021 06:00) (97/53 - 160/63)  BP(mean): 71 (29 Sep 2021 06:00) (66 - 93)  RR: 17 (29 Sep 2021 06:00) (16 - 32)  SpO2: 95% (29 Sep 2021 08:00) (93% - 100%)  General: Chronically ill appearing  HEENT: Trach  Neck: No JVD, no carotid bruit  Lungs: Coarse bilaterally  CV: RRR, nl S1/S2, no M/R/G  Abdomen: S/NT/ND, +BS  Extremities: No LE edema, no cyanosis  Neuro: AAOx0  Skin: No rash    Labs:    09-29    141  |  106  |  13  ----------------------------<  103<H>  3.8   |  26  |  0.92    Ca    8.5      29 Sep 2021 07:08    TPro  6.3  /  Alb  2.0<L>  /  TBili  0.4  /  DBili  x   /  AST  10  /  ALT  18  /  AlkPhos  106  09-29                        7.5    7.44  )-----------( 346      ( 29 Sep 2021 07:08 )             25.1     Telemetry: Sinus rhythm

## 2021-09-30 DIAGNOSIS — I96 GANGRENE, NOT ELSEWHERE CLASSIFIED: ICD-10-CM

## 2021-09-30 LAB
ALBUMIN SERPL ELPH-MCNC: 1.9 G/DL — LOW (ref 3.3–5)
ALP SERPL-CCNC: 107 U/L — SIGNIFICANT CHANGE UP (ref 30–120)
ALT FLD-CCNC: 17 U/L DA — SIGNIFICANT CHANGE UP (ref 10–60)
ANION GAP SERPL CALC-SCNC: 11 MMOL/L — SIGNIFICANT CHANGE UP (ref 5–17)
AST SERPL-CCNC: 27 U/L — SIGNIFICANT CHANGE UP (ref 10–40)
BILIRUB SERPL-MCNC: 0.4 MG/DL — SIGNIFICANT CHANGE UP (ref 0.2–1.2)
BUN SERPL-MCNC: 12 MG/DL — SIGNIFICANT CHANGE UP (ref 7–23)
CALCIUM SERPL-MCNC: 8.6 MG/DL — SIGNIFICANT CHANGE UP (ref 8.4–10.5)
CHLORIDE SERPL-SCNC: 105 MMOL/L — SIGNIFICANT CHANGE UP (ref 96–108)
CO2 SERPL-SCNC: 22 MMOL/L — SIGNIFICANT CHANGE UP (ref 22–31)
CREAT SERPL-MCNC: 0.91 MG/DL — SIGNIFICANT CHANGE UP (ref 0.5–1.3)
CULTURE RESULTS: SIGNIFICANT CHANGE UP
CULTURE RESULTS: SIGNIFICANT CHANGE UP
GLUCOSE SERPL-MCNC: 72 MG/DL — SIGNIFICANT CHANGE UP (ref 70–99)
HCT VFR BLD CALC: 27.6 % — LOW (ref 34.5–45)
HGB BLD-MCNC: 8.3 G/DL — LOW (ref 11.5–15.5)
MCHC RBC-ENTMCNC: 27.3 PG — SIGNIFICANT CHANGE UP (ref 27–34)
MCHC RBC-ENTMCNC: 30.1 GM/DL — LOW (ref 32–36)
MCV RBC AUTO: 90.8 FL — SIGNIFICANT CHANGE UP (ref 80–100)
NRBC # BLD: 0 /100 WBCS — SIGNIFICANT CHANGE UP (ref 0–0)
PLATELET # BLD AUTO: 472 K/UL — HIGH (ref 150–400)
POTASSIUM SERPL-MCNC: 4.1 MMOL/L — SIGNIFICANT CHANGE UP (ref 3.5–5.3)
POTASSIUM SERPL-SCNC: 4.1 MMOL/L — SIGNIFICANT CHANGE UP (ref 3.5–5.3)
PROT SERPL-MCNC: 6.7 G/DL — SIGNIFICANT CHANGE UP (ref 6–8.3)
RBC # BLD: 3.04 M/UL — LOW (ref 3.8–5.2)
RBC # FLD: 19.8 % — HIGH (ref 10.3–14.5)
SODIUM SERPL-SCNC: 138 MMOL/L — SIGNIFICANT CHANGE UP (ref 135–145)
SPECIMEN SOURCE: SIGNIFICANT CHANGE UP
SPECIMEN SOURCE: SIGNIFICANT CHANGE UP
WBC # BLD: 10.07 K/UL — SIGNIFICANT CHANGE UP (ref 3.8–10.5)
WBC # FLD AUTO: 10.07 K/UL — SIGNIFICANT CHANGE UP (ref 3.8–10.5)

## 2021-09-30 PROCEDURE — 71045 X-RAY EXAM CHEST 1 VIEW: CPT | Mod: 26

## 2021-09-30 PROCEDURE — 99221 1ST HOSP IP/OBS SF/LOW 40: CPT

## 2021-09-30 PROCEDURE — 99233 SBSQ HOSP IP/OBS HIGH 50: CPT

## 2021-09-30 RX ORDER — POVIDONE-IODINE 5 %
1 AEROSOL (ML) TOPICAL DAILY
Refills: 0 | Status: DISCONTINUED | OUTPATIENT
Start: 2021-09-30 | End: 2021-10-04

## 2021-09-30 RX ADMIN — FENTANYL CITRATE 1 PATCH: 50 INJECTION INTRAVENOUS at 21:03

## 2021-09-30 RX ADMIN — PIPERACILLIN AND TAZOBACTAM 25 GRAM(S): 4; .5 INJECTION, POWDER, LYOPHILIZED, FOR SOLUTION INTRAVENOUS at 21:27

## 2021-09-30 RX ADMIN — Medication 1 PUFF(S): at 00:31

## 2021-09-30 RX ADMIN — Medication 1 PUFF(S): at 13:14

## 2021-09-30 RX ADMIN — Medication 1 MILLIGRAM(S): at 06:17

## 2021-09-30 RX ADMIN — Medication 1 GRAM(S): at 06:18

## 2021-09-30 RX ADMIN — HEPARIN SODIUM 5000 UNIT(S): 5000 INJECTION INTRAVENOUS; SUBCUTANEOUS at 14:56

## 2021-09-30 RX ADMIN — PIPERACILLIN AND TAZOBACTAM 25 GRAM(S): 4; .5 INJECTION, POWDER, LYOPHILIZED, FOR SOLUTION INTRAVENOUS at 06:19

## 2021-09-30 RX ADMIN — Medication 2: at 23:14

## 2021-09-30 RX ADMIN — CHLORHEXIDINE GLUCONATE 15 MILLILITER(S): 213 SOLUTION TOPICAL at 17:32

## 2021-09-30 RX ADMIN — Medication 10 MILLIGRAM(S): at 23:12

## 2021-09-30 RX ADMIN — ALBUTEROL 1 PUFF(S): 90 AEROSOL, METERED ORAL at 06:36

## 2021-09-30 RX ADMIN — Medication 1 GRAM(S): at 17:06

## 2021-09-30 RX ADMIN — Medication 1 PUFF(S): at 19:55

## 2021-09-30 RX ADMIN — METHOCARBAMOL 250 MILLIGRAM(S): 500 TABLET, FILM COATED ORAL at 06:17

## 2021-09-30 RX ADMIN — CHLORHEXIDINE GLUCONATE 15 MILLILITER(S): 213 SOLUTION TOPICAL at 06:17

## 2021-09-30 RX ADMIN — INSULIN GLARGINE 36 UNIT(S): 100 INJECTION, SOLUTION SUBCUTANEOUS at 23:13

## 2021-09-30 RX ADMIN — FENTANYL CITRATE 1 PATCH: 50 INJECTION INTRAVENOUS at 09:34

## 2021-09-30 RX ADMIN — SENNA PLUS 1 TABLET(S): 8.6 TABLET ORAL at 11:40

## 2021-09-30 RX ADMIN — Medication 1 MILLIGRAM(S): at 00:02

## 2021-09-30 RX ADMIN — Medication 1 TABLET(S): at 11:40

## 2021-09-30 RX ADMIN — METHOCARBAMOL 250 MILLIGRAM(S): 500 TABLET, FILM COATED ORAL at 17:06

## 2021-09-30 RX ADMIN — Medication 2: at 17:07

## 2021-09-30 RX ADMIN — Medication 1 MILLIGRAM(S): at 23:18

## 2021-09-30 RX ADMIN — HEPARIN SODIUM 5000 UNIT(S): 5000 INJECTION INTRAVENOUS; SUBCUTANEOUS at 21:28

## 2021-09-30 RX ADMIN — Medication 1 GRAM(S): at 23:13

## 2021-09-30 RX ADMIN — Medication 1 MILLIGRAM(S): at 11:40

## 2021-09-30 RX ADMIN — Medication 1 APPLICATION(S): at 16:06

## 2021-09-30 RX ADMIN — Medication 1 MILLIGRAM(S): at 20:14

## 2021-09-30 RX ADMIN — HEPARIN SODIUM 5000 UNIT(S): 5000 INJECTION INTRAVENOUS; SUBCUTANEOUS at 06:18

## 2021-09-30 RX ADMIN — Medication 81 MILLIGRAM(S): at 11:40

## 2021-09-30 RX ADMIN — Medication 1 PUFF(S): at 06:36

## 2021-09-30 RX ADMIN — Medication 1 GRAM(S): at 11:40

## 2021-09-30 RX ADMIN — Medication 1 GRAM(S): at 00:02

## 2021-09-30 RX ADMIN — ALBUTEROL 1 PUFF(S): 90 AEROSOL, METERED ORAL at 13:14

## 2021-09-30 RX ADMIN — Medication 1 MILLIGRAM(S): at 17:06

## 2021-09-30 RX ADMIN — PIPERACILLIN AND TAZOBACTAM 25 GRAM(S): 4; .5 INJECTION, POWDER, LYOPHILIZED, FOR SOLUTION INTRAVENOUS at 14:56

## 2021-09-30 RX ADMIN — PANTOPRAZOLE SODIUM 40 MILLIGRAM(S): 20 TABLET, DELAYED RELEASE ORAL at 11:40

## 2021-09-30 RX ADMIN — ALBUTEROL 1 PUFF(S): 90 AEROSOL, METERED ORAL at 19:55

## 2021-09-30 RX ADMIN — Medication 1 MILLIGRAM(S): at 01:22

## 2021-09-30 NOTE — PROGRESS NOTE ADULT - ASSESSMENT
VIRIDIANAAMI BREA 75 f [facility] S 9/24/2021   DR ILIANA KEMP     REVIEW OF SYMPTOMS      Able to give (reliable) ROS  NO     PHYSICAL EXAM    HEENT Unremarkable  atraumatic   RESP Fair air entry EXP prolonged    Harsh breath sound Resp distres mild   CARDIAC S1 S2 No S3     NO JVD    ABDOMEN SOFT BS PRESENT NOT DISTENDED No hepatosplenomegaly   PEDAL EDEMA present No calf tenderness  NO rash       PROBLEMS.        VAP poa 9/25  UTI poa 9/25  VDRF (pmh)  PEG (pmh)  ANOXIC ENCEPH (pmh)     WORSENING CXR 9/27/2021   ANEMIA 9/29/2021 Hb 7.5      EVENTS.    9/30/2021 w 10 Hb 8.3 Na 138 Cr .9   9/30/2021 cxr sl clearing of lungs     BEST PRACTICE ISSUES.                                                  HEAD OF BED ELEVATION. Yes  DVT PROPHYLAXIS.   [facility]                                       SQUIRES PROPHYLAXIS.  carafate (9/25)                                                                                         DIET.            jevity 1.5 30/h 9/26/2021 gt               INFECTION PROPHYLAXIS.   chlorhexidine .12% 9/26/2021                      GENERAL ISSUES  GO ADVANCED DIRECTIVE.                                         ALLERGY.  codeine                            WEIGHT.      9/25/2021 61                              BMI.                 9/25/2021 22     PATIENT DATA   TUBES  PROCEDURES.     poa Trach  poa peg    ABG.   9/26 7p 40%/vent 743/39/76    OXYGENATION.                   VITALS/IO/VENT/DRIPS.    9/30/2021 99f 91 140/50   9/30/2021 18/400/5/.3     PROBLEM ASSESSMENT/RECOMMENDATIONS.  COVID STATUS.  scv2 9/25/2021 (-)  spk ab 9/26/2021 (+) 250   RESP.  VDRF.   Lung protective ventilation  target po 90-95%  clinically stable     INFECTION.  VAP.   UTI.  W 9/25-9/26-9/27/2021 w 13-9.6-7.8   pr 9/26/2021 pr 2.1   ua 9/25/2021 w 26-50 l estrase mod   9/27/2021 cxr bl perihilar and lower lobe consolidations progressive   eventration r diaphr   ct cap 9/25/2021 dense bl cpnsolidations new mediastinal lne Small bl effs dilated fluid filled rectum   mrsa 9/27 (-)   9/26 sp   1) stenotrophomonas   2) carbapenem resistant pseucomonas   3) serratia  urine c 9/25 uc 100k proteus   zosyn 9/25 Abio course to end 9/30     COPD.  prov p (9/25)  atrov (9/25)  under control   ANEMIA.  Hb 9/25-9/26-9/27-9/29/2021 hb 10 - 9.1-8.4-7.5      TIME SPENT   Over 36 minutes aggregate critical care time spent on encounter; activities included   direct patient care, counseling and/or coordinating care reviewing notes, lab data/ imaging , discussion with multidisciplinary team/ patient  /family and explaining in detail risks, benefits, alternatives  of the recommendations     CHAPINCITO GUIDRY 75 f [place] S 9/24/2021   DR ILIANA KEMP

## 2021-09-30 NOTE — PROGRESS NOTE ADULT - ASSESSMENT
Patient is a 75 year old female with PMH of HTN, DM type 2, CVA, recent Cardiac Arrest, Chronic Respiratory Failure, Vent Dependant s/p trach & PEG, Anemia with GI blood loss, and Dementia who was sent from [facility] facility for acute respiratory distress  Sepsis due to recurrent ventilator associated pneumonia vs aspiration pneumonia, possible UTI    - fever 100.6F yesterday, now afebrile. Leukocytosis normalized. Sepsis resolved.    - trach to vent, FiO2 50%.     - CT imaging reviewed, new dense b/l lung consolidations, mediastinal lad, small b/l effusions and atelectasis,  recent adm with PNA with Serratia marcescens and Pseudomonas aeruginosa in sputum    - RVP/COVID and legionella urine ag negative    - UA with pyuria, has vaughn in place with yellow urine; prior cx reviewed colonized with Proteus    Recommendations:   PNA-  based on prior micro and clinical response so far    - s/p vancomycin and pip-tazo,    - 9/28 noted Serratia marcescens and Pseudomonas aeruginosa and now stenotrophomonas in sputum    will follow for any need to add any coverage for steno but pt appears to be improving on current Rx so no change in current plan  Acute on chronic respiratory distress due to above  RYAN, nephrology following, pt seems much improved from admission, currently afebrile with decrease in pulm secretions, minimal vent support and discussed with nursing - pt stable and improved  Continue on pip-tazo (started late 9/25) so anticipate 5 days with last full day today  9/30   -pt remains in [facility]PCU on ventilatory support  -tried to call  Colin Daily at [phone] to discuss case    We will follow along in the care of this patient. Please call us at [phone] or text me directly on my cell# at [phone] with any concerns.

## 2021-09-30 NOTE — PROGRESS NOTE ADULT - SUBJECTIVE AND OBJECTIVE BOX
[facility]ECKHAM    [facility]    Allergies    codeine (Hives)    Intolerances        PAST MEDICAL & SURGICAL HISTORY:  Dementia of frontal lobe type    Aphasic stroke    Diabetes mellitus    Respiratory failure    Hypertension    GERD (gastroesophageal reflux disease)    Constipation    Respiratory failure    CVA (cerebral vascular accident)    HTN (hypertension)    DM (diabetes mellitus)    Advanced dementia    COVID-19 virus detected    Quadriplegia    Pneumonia    Type II diabetes mellitus    Hx of appendectomy    Gastrostomy in place    Tracheostomy in place    Tracheostomy tube present    Feeding by G-tube        FAMILY HISTORY:  No pertinent family history in first degree relatives        Home Medications:  acetaminophen 160 mg/5 mL oral suspension: 20.31 milliliter(s) by gastrostomy tube every 6 hours, As Needed (23 Jun 2021 09:08)  albuterol 90 mcg/inh inhalation aerosol: 2 puff(s) inhaled every 6 hours (23 Jun 2021 09:08)  aspirin 81 mg oral tablet, chewable: 1 tab(s) by PEG tube once a day (15 Zay 2021 15:07)  Bacid (LAC) oral tablet: 2 tab(s) by gastrostomy tube once a day (23 Jun 2021 09:08)  bisacodyl 5 mg oral delayed release tablet: 1 tab(s) orally once a day (at bedtime) (23 Jun 2021 09:08)  Carafate 1 g/10 mL oral suspension: 10 milliliter(s) by gastrostomy tube 4 times a day (before meals and at bedtime) for 14 days (Started 6/4/21) (15 Zay 2021 15:07)  chlorhexidine 0.12% mucous membrane liquid: 15 milliliter(s) mucous membrane 2 times a day (15 Sep 2021 12:08)  insulin glargine: 36 unit(s) subcutaneous once a day (at bedtime) (15 Sep 2021 12:08)  ipratropium-albuterol 0.5 mg-2.5 mg/3 mL inhalation solution: 3 milliliter(s) inhaled 4 times a day (15 Zay 2021 15:07)  LORazepam 0.5 mg oral tablet: 1 tab(s) orally every 2 hours, As needed, Agitation (15 Sep 2021 12:08)  LORazepam 1 mg oral tablet: 1 tab(s) orally every 6 hours (15 Sep 2021 12:08)  methocarbamol: 250 milligram(s) by gastrostomy tube 2 times a day (15 Sep 2021 12:08)  pantoprazole 40 mg oral granule, delayed release: 40 milligram(s) by gastrostomy tube 2 times a day (15 Sep 2021 12:08)  polyethylene glycol 3350 oral powder for reconstitution: 17 gram(s) orally every 12 hours (23 Jun 2021 09:08)  senna 8.6 mg oral tablet: 1 tab(s) by gastrostomy tube once a day (at bedtime) (23 Jun 2021 09:08)  simethicone 80 mg oral tablet, chewable: 1 tab(s) orally every 6 hours (15 Sep 2021 12:08)      MEDICATIONS  (STANDING):  aspirin  chewable 81 milliGRAM(s) Oral daily  bisacodyl Suppository 10 milliGRAM(s) Rectal at bedtime  chlorhexidine 0.12% Liquid 15 milliLiter(s) Oral Mucosa every 12 hours  dextrose 40% Gel 15 Gram(s) Oral once  dextrose 5%. 1000 milliLiter(s) (50 mL/Hr) IV Continuous <Continuous>  dextrose 5%. 1000 milliLiter(s) (100 mL/Hr) IV Continuous <Continuous>  dextrose 50% Injectable 25 Gram(s) IV Push once  dextrose 50% Injectable 12.5 Gram(s) IV Push once  dextrose 50% Injectable 25 Gram(s) IV Push once  fentaNYL   Patch  12 MICROgram(s)/Hr 1 Patch Transdermal every 72 hours  glucagon  Injectable 1 milliGRAM(s) IntraMuscular once  heparin   Injectable 5000 Unit(s) SubCutaneous every 8 hours  insulin glargine Injectable (LANTUS) 36 Unit(s) SubCutaneous at bedtime  insulin lispro (ADMELOG) corrective regimen sliding scale   SubCutaneous every 6 hours  ipratropium 17 MICROgram(s) HFA Inhaler 1 Puff(s) Inhalation every 6 hours  lactobacillus acidophilus 1 Tablet(s) Oral daily  LORazepam     Tablet 1 milliGRAM(s) Oral every 6 hours  methocarbamol 250 milliGRAM(s) Oral two times a day  pantoprazole  Injectable 40 milliGRAM(s) IV Push daily  piperacillin/tazobactam IVPB.. 3.375 Gram(s) IV Intermittent every 8 hours  senna 1 Tablet(s) Oral daily  sucralfate suspension 1 Gram(s) Enteral Tube four times a day    MEDICATIONS  (PRN):  ALBUTerol    90 MICROgram(s) HFA Inhaler 1 Puff(s) Inhalation every 6 hours PRN Shortness of Breath and/or Wheezing  LORazepam   Injectable 1 milliGRAM(s) IV Push every 6 hours PRN agtation and vent dyssynchrony  polyethylene glycol 3350 17 Gram(s) Oral every 12 hours PRN Constipation  simethicone 80 milliGRAM(s) Chew two times a day PRN Dyspepsia      Diet, NPO with Tube Feed:   Tube Feeding Modality: Gastrostomy  Jevity 1.5 Horacio  Total Volume for 24 Hours (mL): 720  Continuous  Starting Tube Feed Rate mL per Hour: 30  Until Goal Tube Feed Rate (mL per Hour): 30  Tube Feed Duration (in Hours): 24  Tube Feed Start Time: 19:00 (09-26-21 @ 18:08) [Active]          Vital Signs Last 24 Hrs  T(C): 36.9 (30 Sep 2021 09:40), Max: 36.9 (29 Sep 2021 20:29)  T(F): 98.4 (30 Sep 2021 09:40), Max: 98.4 (29 Sep 2021 20:29)  HR: 77 (30 Sep 2021 06:32) (69 - 99)  BP: 103/53 (30 Sep 2021 06:00) (83/46 - 130/69)  BP(mean): 69 (30 Sep 2021 06:00) (58 - 87)  RR: 16 (30 Sep 2021 06:00) (16 - 33)  SpO2: 97% (30 Sep 2021 06:32) (95% - 100%)      09-29-21 @ 07:01  -  09-30-21 @ 07:00  --------------------------------------------------------  IN: 1690 mL / OUT: 650 mL / NET: 1040 mL        Mode: PRVC, RR (machine): 18, TV (machine): 400, FiO2: 30, PEEP: 8, ITime: 1, MAP: 17, PIP: 31      LABS:                        8.3    10.07 )-----------( 472      ( 30 Sep 2021 08:59 )             27.6     09-29    141  |  106  |  13  ----------------------------<  103<H>  3.8   |  26  |  0.92    Ca    8.5      29 Sep 2021 07:08    TPro  6.3  /  Alb  2.0<L>  /  TBili  0.4  /  DBili  x   /  AST  10  /  ALT  18  /  AlkPhos  106  09-29              WBC:  WBC Count: 10.07 K/uL (09-30 @ 08:59)  WBC Count: 7.44 K/uL (09-29 @ 07:08)  WBC Count: 7.82 K/uL (09-27 @ 12:08)  WBC Count: 6.92 K/uL (09-27 @ 05:45)      MICROBIOLOGY:  RECENT CULTURES:  09-26 .Sputum Sputum Pseudomonas aeruginosa (Carbapenem Resistant)  Serratia marcescens  Stenotrophomonas maltophilia   Few polymorphonuclear leukocytes per low power field  Rare Squamous epithelial cells per low power field  Few Gram Negative Rods per oil power field   Moderate Pseudomonas aeruginosa (Carbapenem Resistant)  Moderate Serratia marcescens  Moderate Stenotrophomonas maltophilia  Normal Respiratory Elvira absent    09-25 .Blood Blood-Peripheral Proteus mirabilis XXXX   No growth to date.                    Sodium:  Sodium, Serum: 141 mmol/L (09-29 @ 07:08)  Sodium, Serum: 140 mmol/L (09-27 @ 05:45)      0.92 mg/dL 09-29 @ 07:08  0.93 mg/dL 09-27 @ 05:45      Hemoglobin:  Hemoglobin: 8.3 g/dL (09-30 @ 08:59)  Hemoglobin: 7.5 g/dL (09-29 @ 07:08)  Hemoglobin: 8.4 g/dL (09-27 @ 12:08)  Hemoglobin: 7.7 g/dL (09-27 @ 05:45)  Hemoglobin: 8.6 g/dL (09-26 @ 12:00)      Platelets: Platelet Count - Automated: 472 K/uL (09-30 @ 08:59)  Platelet Count - Automated: 346 K/uL (09-29 @ 07:08)  Platelet Count - Automated: 286 K/uL (09-27 @ 12:08)  Platelet Count - Automated: 342 K/uL (09-27 @ 05:45)      LIVER FUNCTIONS - ( 29 Sep 2021 07:08 )  Alb: 2.0 g/dL / Pro: 6.3 g/dL / ALK PHOS: 106 U/L / ALT: 18 U/L DA / AST: 10 U/L / GGT: x                 RADIOLOGY & ADDITIONAL STUDIES:      MICROBIOLOGY:  RECENT CULTURES:  09-26 .Sputum Sputum Pseudomonas aeruginosa (Carbapenem Resistant)  Serratia marcescens  Stenotrophomonas maltophilia   Few polymorphonuclear leukocytes per low power field  Rare Squamous epithelial cells per low power field  Few Gram Negative Rods per oil power field   Moderate Pseudomonas aeruginosa (Carbapenem Resistant)  Moderate Serratia marcescens  Moderate Stenotrophomonas maltophilia  Normal Respiratory Elvira absent    09-25 .Blood Blood-Peripheral Proteus mirabilis XXXX   No growth to date.

## 2021-09-30 NOTE — PROGRESS NOTE ADULT - PROBLEM SELECTOR PLAN 7
uncontrolled, continue on Lantus insulin 36 units at bedtime in addition to corrective insulin Lispro scale coverage before Q 6h, check glycohemoglobin level in am. uncontrolled, continue on Lantus insulin 36 units at bedtime in addition to corrective insulin Lispro scale coverage before Q 6h.

## 2021-09-30 NOTE — CONSULT NOTE ADULT - SUBJECTIVE AND OBJECTIVE BOX
HPI:  This is a 74 y/o F with PMH of HTN, DM type 2, CVA, recent Cardiac Arrest, Chronic Respiratory Failure, Vent Dependant s/p trach & PEG, Anemia with GI blood loss, and Dementia who was sent [place] [facility] for [place] distress. Patient is awake & alert but non verbal.      PAST MEDICAL & SURGICAL HISTORY:  Dementia of frontal lobe type    Aphasic stroke    Diabetes mellitus    Respiratory failure    Hypertension    GERD (gastroesophageal reflux disease)    Constipation    Respiratory failure    CVA (cerebral vascular accident)    HTN (hypertension)    DM (diabetes mellitus)    Advanced dementia    COVID-19 virus detected    Quadriplegia    Pneumonia    Type II diabetes mellitus    Hx of appendectomy    Gastrostomy in place    Tracheostomy in place    Tracheostomy tube present    Feeding by G-tube	      Allergies    codeine (Hives)    Intolerances        SOCIAL HISTORY:     FAMILY HISTORY:  No pertinent family history in first degree relatives    Vital Signs Last 24 Hrs  T(C): 36.9 (30 Sep 2021 09:40), Max: 36.9 (29 Sep 2021 20:29)  T(F): 98.4 (30 Sep 2021 09:40), Max: 98.4 (29 Sep 2021 20:29)  HR: 73 (30 Sep 2021 11:00) (69 - 99)  BP: 103/53 (30 Sep 2021 06:00) (83/46 - 130/69)  BP(mean): 69 (30 Sep 2021 06:00) (58 - 87)  RR: 16 (30 Sep 2021 06:00) (16 - 33)  SpO2: 96% (30 Sep 2021 11:00) (95% - 100%)      Total Care Sport/ Versa Care P500 bed        Neurology   nonverbal, can not follow commands    Musculoskeletal/Vascular  +deformities/ contractures    Skin:  frail    A1C with Estimated Average Glucose Result: 6.6 % (09-29-21 @ 21:06)

## 2021-09-30 NOTE — PROGRESS NOTE ADULT - SUBJECTIVE AND OBJECTIVE BOX
Date/Time Patient Seen:  		  Referring MD:   Data Reviewed	       Patient is a 75y old  Female who presents with a chief complaint of Respiratory distress. (29 Sep 2021 13:27)      Subjective/HPI     PAST MEDICAL & SURGICAL HISTORY:  Dementia of frontal lobe type    Aphasic stroke    Diabetes mellitus    Respiratory failure    Hypertension    GERD (gastroesophageal reflux disease)    Constipation    Respiratory failure    CVA (cerebral vascular accident)    HTN (hypertension)    DM (diabetes mellitus)    Advanced dementia    COVID-19 virus detected    Quadriplegia    Pneumonia    Type II diabetes mellitus    Hx of appendectomy    Gastrostomy in place    Tracheostomy in place    Tracheostomy tube present    Feeding by G-tube          Medication list         MEDICATIONS  (STANDING):  aspirin  chewable 81 milliGRAM(s) Oral daily  bisacodyl Suppository 10 milliGRAM(s) Rectal at bedtime  chlorhexidine 0.12% Liquid 15 milliLiter(s) Oral Mucosa every 12 hours  dextrose 40% Gel 15 Gram(s) Oral once  dextrose 5%. 1000 milliLiter(s) (50 mL/Hr) IV Continuous <Continuous>  dextrose 5%. 1000 milliLiter(s) (100 mL/Hr) IV Continuous <Continuous>  dextrose 50% Injectable 25 Gram(s) IV Push once  dextrose 50% Injectable 12.5 Gram(s) IV Push once  dextrose 50% Injectable 25 Gram(s) IV Push once  fentaNYL   Patch  12 MICROgram(s)/Hr 1 Patch Transdermal every 72 hours  glucagon  Injectable 1 milliGRAM(s) IntraMuscular once  heparin   Injectable 5000 Unit(s) SubCutaneous every 8 hours  insulin glargine Injectable (LANTUS) 36 Unit(s) SubCutaneous at bedtime  insulin lispro (ADMELOG) corrective regimen sliding scale   SubCutaneous every 6 hours  ipratropium 17 MICROgram(s) HFA Inhaler 1 Puff(s) Inhalation every 6 hours  lactobacillus acidophilus 1 Tablet(s) Oral daily  LORazepam     Tablet 1 milliGRAM(s) Oral every 6 hours  methocarbamol 250 milliGRAM(s) Oral two times a day  pantoprazole  Injectable 40 milliGRAM(s) IV Push daily  piperacillin/tazobactam IVPB.. 3.375 Gram(s) IV Intermittent every 8 hours  senna 1 Tablet(s) Oral daily  sucralfate suspension 1 Gram(s) Enteral Tube four times a day    MEDICATIONS  (PRN):  ALBUTerol    90 MICROgram(s) HFA Inhaler 1 Puff(s) Inhalation every 6 hours PRN Shortness of Breath and/or Wheezing  LORazepam   Injectable 1 milliGRAM(s) IV Push every 6 hours PRN agtation and vent dyssynchrony  polyethylene glycol 3350 17 Gram(s) Oral every 12 hours PRN Constipation  simethicone 80 milliGRAM(s) Chew two times a day PRN Dyspepsia         Vitals log        ICU Vital Signs Last 24 Hrs  T(C): 36.5 (30 Sep 2021 04:04), Max: 36.9 (29 Sep 2021 20:29)  T(F): 97.7 (30 Sep 2021 04:04), Max: 98.4 (29 Sep 2021 20:29)  HR: 77 (30 Sep 2021 06:32) (69 - 99)  BP: 103/53 (30 Sep 2021 06:00) (83/46 - 130/69)  BP(mean): 69 (30 Sep 2021 06:00) (58 - 87)  ABP: --  ABP(mean): --  RR: 16 (30 Sep 2021 06:00) (16 - 33)  SpO2: 97% (30 Sep 2021 06:32) (95% - 100%)       Mode: PRVC  RR (machine): 18  TV (machine): 400  FiO2: 30  PEEP: 8  ITime: 1  MAP: 17  PIP: 31      Input and Output:  I&O's Detail    28 Sep 2021 07:01  -  29 Sep 2021 07:00  --------------------------------------------------------  IN:    Enteral Tube Flush: 400 mL    Free Water: 400 mL    IV PiggyBack: 200 mL    Jevity 1.5: 720 mL  Total IN: 1720 mL    OUT:    Voided (mL): 1000 mL  Total OUT: 1000 mL    Total NET: 720 mL      29 Sep 2021 07:01  -  30 Sep 2021 06:35  --------------------------------------------------------  IN:    Free Water: 600 mL    IV PiggyBack: 100 mL    Jevity 1.5: 660 mL  Total IN: 1360 mL    OUT:    Dexmedetomidine: 0 mL    Voided (mL): 650 mL  Total OUT: 650 mL    Total NET: 710 mL          Lab Data                        7.5    7.44  )-----------( 346      ( 29 Sep 2021 07:08 )             25.1     09-29    141  |  106  |  13  ----------------------------<  103<H>  3.8   |  26  |  0.92    Ca    8.5      29 Sep 2021 07:08    TPro  6.3  /  Alb  2.0<L>  /  TBili  0.4  /  DBili  x   /  AST  10  /  ALT  18  /  AlkPhos  106  09-29            Review of Systems	      Objective     Physical Examination    heart s1s2  lung dec BS      Pertinent Lab findings & Imaging      Annalise:  NO   Adequate UO     I&O's Detail    28 Sep 2021 07:01  -  29 Sep 2021 07:00  --------------------------------------------------------  IN:    Enteral Tube Flush: 400 mL    Free Water: 400 mL    IV PiggyBack: 200 mL    Jevity 1.5: 720 mL  Total IN: 1720 mL    OUT:    Voided (mL): 1000 mL  Total OUT: 1000 mL    Total NET: 720 mL      29 Sep 2021 07:01  -  30 Sep 2021 06:35  --------------------------------------------------------  IN:    Free Water: 600 mL    IV PiggyBack: 100 mL    Jevity 1.5: 660 mL  Total IN: 1360 mL    OUT:    Dexmedetomidine: 0 mL    Voided (mL): 650 mL  Total OUT: 650 mL    Total NET: 710 mL               Discussed with:     Cultures:	        Radiology

## 2021-09-30 NOTE — CONSULT NOTE ADULT - CONSULT REQUESTED DATE/TIME
25-Sep-2021 20:57
26-Sep-2021 14:54
25-Sep-2021 17:32
25-Sep-2021 19:27
25-Sep-2021 18:08
25-Sep-2021
30-Sep-2021 08:00

## 2021-09-30 NOTE — PROGRESS NOTE ADULT - SUBJECTIVE AND OBJECTIVE BOX
Patient is a 75y Female with a known history of :  Aspiration pneumonia [J69.0]    Sepsis [A41.9]    UTI (urinary tract infection) [N39.0]    Acute on chronic respiratory failure with hypoxia [J96.21]    RYAN (acute kidney injury) [N17.9]    Hypermagnesemia [E83.41]    Type 2 diabetes mellitus [E11.9]    Need for prophylactic measure [Z29.9]    Functional quadriplegia [R53.2]    Necrotic eschar [I96]      HPI:  This is a 74 y/o F with PMH of HTN, DM type 2, CVA, recent Cardiac Arrest, Chronic Respiratory Failure, Vent Dependant s/p trach & PEG, Anemia with GI blood loss, and Dementia who was sent from [facility] facility for acute respiratory distress. Patient was admitted to [facility] about 3 weeks ago s/p cardiac arrest with B/L aspiration PNA, Sepsis, Proteus Mirabilis UTI, and infected foot ulcer, and discharged 10 days ago. At the ED she was found with thick ous at her airway that responded well to suction, was found with new CT findings suggestive of aspiration PNA, also with UTI, and lactic acid of 5.8 mmol/L. Patient is awake & alert but non verbal, no further history could be obtained at this time.  (25 Sep 2021 22:13)      REVIEW OF SYSTEMS:    CONSTITUTIONAL: No fever, weight loss, or fatigue  EYES: No eye pain, visual disturbances, or discharge  ENMT:  No difficulty hearing, tinnitus, vertigo; No sinus or throat pain  NECK: No pain or stiffness  BREASTS: No pain, masses, or nipple discharge  RESPIRATORY: No cough, wheezing, chills or hemoptysis; No shortness of breath  CARDIOVASCULAR: No chest pain, palpitations, dizziness, or leg swelling  GASTROINTESTINAL: No abdominal or epigastric pain. No nausea, vomiting, or hematemesis; No diarrhea or constipation. No melena or hematochezia.  GENITOURINARY: No dysuria, frequency, hematuria, or incontinence  NEUROLOGICAL: No headaches, memory loss, loss of strength, numbness, or tremors  SKIN: No itching, burning, rashes, or lesions   LYMPH NODES: No enlarged glands  ENDOCRINE: No heat or cold intolerance; No hair loss  MUSCULOSKELETAL: No joint pain or swelling; No muscle, back, or extremity pain  PSYCHIATRIC: No depression, anxiety, mood swings, or difficulty sleeping  HEME/LYMPH: No easy bruising, or bleeding gums  ALLERGY AND IMMUNOLOGIC: No hives or eczema    MEDICATIONS  (STANDING):  aspirin  chewable 81 milliGRAM(s) Oral daily  bisacodyl Suppository 10 milliGRAM(s) Rectal at bedtime  chlorhexidine 0.12% Liquid 15 milliLiter(s) Oral Mucosa every 12 hours  dextrose 40% Gel 15 Gram(s) Oral once  dextrose 5%. 1000 milliLiter(s) (50 mL/Hr) IV Continuous <Continuous>  dextrose 5%. 1000 milliLiter(s) (100 mL/Hr) IV Continuous <Continuous>  dextrose 50% Injectable 25 Gram(s) IV Push once  dextrose 50% Injectable 12.5 Gram(s) IV Push once  dextrose 50% Injectable 25 Gram(s) IV Push once  fentaNYL   Patch  12 MICROgram(s)/Hr 1 Patch Transdermal every 72 hours  glucagon  Injectable 1 milliGRAM(s) IntraMuscular once  heparin   Injectable 5000 Unit(s) SubCutaneous every 8 hours  insulin glargine Injectable (LANTUS) 36 Unit(s) SubCutaneous at bedtime  insulin lispro (ADMELOG) corrective regimen sliding scale   SubCutaneous every 6 hours  ipratropium 17 MICROgram(s) HFA Inhaler 1 Puff(s) Inhalation every 6 hours  lactobacillus acidophilus 1 Tablet(s) Oral daily  LORazepam     Tablet 1 milliGRAM(s) Oral every 6 hours  methocarbamol 250 milliGRAM(s) Oral two times a day  pantoprazole  Injectable 40 milliGRAM(s) IV Push daily  piperacillin/tazobactam IVPB.. 3.375 Gram(s) IV Intermittent every 8 hours  povidone iodine 10% Solution 1 Application(s) Topical daily  senna 1 Tablet(s) Oral daily  sucralfate suspension 1 Gram(s) Enteral Tube four times a day    MEDICATIONS  (PRN):  ALBUTerol    90 MICROgram(s) HFA Inhaler 1 Puff(s) Inhalation every 6 hours PRN Shortness of Breath and/or Wheezing  LORazepam   Injectable 1 milliGRAM(s) IV Push every 6 hours PRN agtation and vent dyssynchrony  polyethylene glycol 3350 17 Gram(s) Oral every 12 hours PRN Constipation  simethicone 80 milliGRAM(s) Chew two times a day PRN Dyspepsia      ALLERGIES: codeine (Hives)      FAMILY HISTORY:  No pertinent family history in first degree relatives        Social history:  Alochol:   Smoking:   Drug Use:   Marital Status:     PHYSICAL EXAMINATION:  -----------------------------  T(C): 36.9 (09-30-21 @ 12:51), Max: 36.9 (09-29-21 @ 20:29)  HR: 73 (09-30-21 @ 11:00) (69 - 90)  BP: 103/53 (09-30-21 @ 06:00) (83/46 - 110/54)  RR: 16 (09-30-21 @ 06:00) (16 - 18)  SpO2: 96% (09-30-21 @ 11:00) (95% - 100%)  Wt(kg): --    09-29 @ 07:01  -  09-30 @ 07:00  --------------------------------------------------------  IN:    Free Water: 800 mL    IV PiggyBack: 200 mL    Jevity 1.5: 690 mL  Total IN: 1690 mL    OUT:    Dexmedetomidine: 0 mL    Voided (mL): 650 mL  Total OUT: 650 mL    Total NET: 1040 mL            Constitutional: well developed, normal appearance, well groomed, well nourished, no deformities and no acute distress.   Eyes: the conjunctiva exhibited no abnormalities and the eyelids demonstrated no xanthelasmas.   HEENT: normal oral mucosa, no oral pallor and no oral cyanosis.   Neck: normal jugular venous A waves present, normal jugular venous V waves present and no jugular venous obregon A waves.   Pulmonary: no respiratory distress, normal respiratory rhythm and effort, no accessory muscle use and lungs were clear to auscultation bilaterally. B/L anteriorly rhonchi  Cardiovascular: heart rate and rhythm were normal, normal S1 and S2 and no murmur, gallop, rub, heave or thrill are present.    Musculoskeletal: the gait could not be assessed.   Extremities: no clubbing of the fingernails, no localized cyanosis, no petechial hemorrhages and no ischemic changes.   Skin: normal skin color and pigmentation, no rash, no venous stasis, no skin lesions, no skin ulcer and no xanthoma was observed.      LABS:   --------  09-30    138  |  105  |  12  ----------------------------<  72  4.1   |  22  |  0.91    Ca    8.6      30 Sep 2021 08:59    TPro  6.7  /  Alb  1.9<L>  /  TBili  0.4  /  DBili  x   /  AST  27  /  ALT  17  /  AlkPhos  107  09-30                         8.3    10.07 )-----------( 472      ( 30 Sep 2021 08:59 )             27.6                   Radiology:

## 2021-09-30 NOTE — PROGRESS NOTE ADULT - SUBJECTIVE AND OBJECTIVE BOX
Neurology follow up note    BREA [id number]      Interval History:    Patient resting in bed     Allergies    codeine (Hives)    Intolerances        MEDICATIONS    ALBUTerol    90 MICROgram(s) HFA Inhaler 1 Puff(s) Inhalation every 6 hours PRN  aspirin  chewable 81 milliGRAM(s) Oral daily  bisacodyl Suppository 10 milliGRAM(s) Rectal at bedtime  chlorhexidine 0.12% Liquid 15 milliLiter(s) Oral Mucosa every 12 hours  dextrose 40% Gel 15 Gram(s) Oral once  dextrose 5%. 1000 milliLiter(s) IV Continuous <Continuous>  dextrose 5%. 1000 milliLiter(s) IV Continuous <Continuous>  dextrose 50% Injectable 25 Gram(s) IV Push once  dextrose 50% Injectable 12.5 Gram(s) IV Push once  dextrose 50% Injectable 25 Gram(s) IV Push once  fentaNYL   Patch  12 MICROgram(s)/Hr 1 Patch Transdermal every 72 hours  glucagon  Injectable 1 milliGRAM(s) IntraMuscular once  heparin   Injectable 5000 Unit(s) SubCutaneous every 8 hours  insulin glargine Injectable (LANTUS) 36 Unit(s) SubCutaneous at bedtime  insulin lispro (ADMELOG) corrective regimen sliding scale   SubCutaneous every 6 hours  ipratropium 17 MICROgram(s) HFA Inhaler 1 Puff(s) Inhalation every 6 hours  lactobacillus acidophilus 1 Tablet(s) Oral daily  LORazepam     Tablet 1 milliGRAM(s) Oral every 6 hours  LORazepam   Injectable 1 milliGRAM(s) IV Push every 6 hours PRN  methocarbamol 250 milliGRAM(s) Oral two times a day  pantoprazole  Injectable 40 milliGRAM(s) IV Push daily  piperacillin/tazobactam IVPB.. 3.375 Gram(s) IV Intermittent every 8 hours  polyethylene glycol 3350 17 Gram(s) Oral every 12 hours PRN  senna 1 Tablet(s) Oral daily  simethicone 80 milliGRAM(s) Chew two times a day PRN  sucralfate suspension 1 Gram(s) Enteral Tube four times a day              Vital Signs Last 24 Hrs  T(C): 36.5 (30 Sep 2021 04:04), Max: 36.9 (29 Sep 2021 20:29)  T(F): 97.7 (30 Sep 2021 04:04), Max: 98.4 (29 Sep 2021 20:29)  HR: 77 (30 Sep 2021 06:32) (69 - 99)  BP: 103/53 (30 Sep 2021 06:00) (83/46 - 130/69)  BP(mean): 69 (30 Sep 2021 06:00) (58 - 87)  RR: 16 (30 Sep 2021 06:00) (16 - 33)  SpO2: 97% (30 Sep 2021 06:32) (95% - 100%)      REVIEW OF SYSTEMS:  Could not be obtained secondary to the patient being nonverbal.  As per my conversation with the spouse, a gradual process along with underlying strokes causing her to become bed-bound.    PHYSICAL EXAMINATION:    Head:  Normocephalic, atraumatic.  Eyes:  No scleral icterus.  Ears:  Cannot be evaluated secondary to the patient being nonverbal.  NECK:  Tracheostomy was in place.  RESPIRATORY:  Good air entry bilaterally.  CARDIOVASCULAR:  S1 and S2 heard.  ABDOMEN:  Soft and nontender.  EXTREMITIES:  No clubbing or cyanosis were noted.      NEUROLOGIC:  The patient was awake in bed.  At present no movement.  does have a history upon stimulating the patient, her eyes did roll up.  Her body started to stiffen with abnormal mouth movements, lasted one to two minutes history of this  Pupils bilaterally were 3 mm, reactive to 2 mm.  The patient has her arms in a flexed position with both hands contracted.  Bilateral lower extremities were in a straight position.  The patient has increased tone, bilateral upper and lower extremities.              LABS:  CBC Full  -  ( 30 Sep 2021 08:59 )  WBC Count : 10.07 K/uL  RBC Count : 3.04 M/uL  Hemoglobin : 8.3 g/dL  Hematocrit : 27.6 %  Platelet Count - Automated : 472 K/uL  Mean Cell Volume : 90.8 fl  Mean Cell Hemoglobin : 27.3 pg  Mean Cell Hemoglobin Concentration : 30.1 gm/dL  Auto Neutrophil # : x  Auto Lymphocyte # : x  Auto Monocyte # : x  Auto Eosinophil # : x  Auto Basophil # : x  Auto Neutrophil % : x  Auto Lymphocyte % : x  Auto Monocyte % : x  Auto Eosinophil % : x  Auto Basophil % : x      09-29    141  |  106  |  13  ----------------------------<  103<H>  3.8   |  26  |  0.92    Ca    8.5      29 Sep 2021 07:08    TPro  6.3  /  Alb  2.0<L>  /  TBili  0.4  /  DBili  x   /  AST  10  /  ALT  18  /  AlkPhos  106  09-29    Hemoglobin A1C:     LIVER FUNCTIONS - ( 29 Sep 2021 07:08 )  Alb: 2.0 g/dL / Pro: 6.3 g/dL / ALK PHOS: 106 U/L / ALT: 18 U/L DA / AST: 10 U/L / GGT: x           Vitamin B12         RADIOLOGY      ANALYSIS AND PLAN:  This is a 75-year-old with an episode of cardiac arrest and abnormal movements.  For episodes of cardiac arrest, continue to monitor heart rate on telemetry.  If possible, please maintain a systolic blood pressure greater than 100, to help maintain cerebral perfusion.  For history of abnormal movements, as per my conversation with the spouse, this is a known condition that happens to her, as per him with GI-related issues.  She will start to hyperventilate, eyes will roll up, will have abnormal mouth movement, and tried to move from side-to-side.  She has undergone extensive EEG monitoring and has captured these events on numerous occasions and deemed to be non-epileptiform.  For now, I would recommend supportive therapy.  No antiepileptic medications.  For history of diabetes, strict control of blood sugars.  For history of cerebrovascular accident, continue the patient on her home medications.  smuscle relaxant     The spouse's name is Marciano.  His telephone number is [phone]     Greater than 40 minutes of time was spent with the patient, plan of care, reviewing data, speaking to the multidisciplinary healthcare team with greater than 50% of that time in counseling and care coordination.

## 2021-09-30 NOTE — PROGRESS NOTE ADULT - SUBJECTIVE AND OBJECTIVE BOX
Patient is a 75y old  Female who presents with a chief complaint of Respiratory distress. (30 Sep 2021 13:29)      INTERVAL HPI/OVERNIGHT EVENTS:  Pt is seen and examined.  unable to obtained due to underlying medical condition.    Pain Location & Control:     MEDICATIONS  (STANDING):  aspirin  chewable 81 milliGRAM(s) Oral daily  bisacodyl Suppository 10 milliGRAM(s) Rectal at bedtime  chlorhexidine 0.12% Liquid 15 milliLiter(s) Oral Mucosa every 12 hours  dextrose 40% Gel 15 Gram(s) Oral once  dextrose 5%. 1000 milliLiter(s) (50 mL/Hr) IV Continuous <Continuous>  dextrose 5%. 1000 milliLiter(s) (100 mL/Hr) IV Continuous <Continuous>  dextrose 50% Injectable 25 Gram(s) IV Push once  dextrose 50% Injectable 12.5 Gram(s) IV Push once  dextrose 50% Injectable 25 Gram(s) IV Push once  fentaNYL   Patch  12 MICROgram(s)/Hr 1 Patch Transdermal every 72 hours  glucagon  Injectable 1 milliGRAM(s) IntraMuscular once  heparin   Injectable 5000 Unit(s) SubCutaneous every 8 hours  insulin glargine Injectable (LANTUS) 36 Unit(s) SubCutaneous at bedtime  insulin lispro (ADMELOG) corrective regimen sliding scale   SubCutaneous every 6 hours  ipratropium 17 MICROgram(s) HFA Inhaler 1 Puff(s) Inhalation every 6 hours  lactobacillus acidophilus 1 Tablet(s) Oral daily  LORazepam     Tablet 1 milliGRAM(s) Oral every 6 hours  methocarbamol 250 milliGRAM(s) Oral two times a day  pantoprazole  Injectable 40 milliGRAM(s) IV Push daily  piperacillin/tazobactam IVPB.. 3.375 Gram(s) IV Intermittent every 8 hours  povidone iodine 10% Solution 1 Application(s) Topical daily  senna 1 Tablet(s) Oral daily  sucralfate suspension 1 Gram(s) Enteral Tube four times a day    MEDICATIONS  (PRN):  ALBUTerol    90 MICROgram(s) HFA Inhaler 1 Puff(s) Inhalation every 6 hours PRN Shortness of Breath and/or Wheezing  LORazepam   Injectable 1 milliGRAM(s) IV Push every 6 hours PRN agtation and vent dyssynchrony  polyethylene glycol 3350 17 Gram(s) Oral every 12 hours PRN Constipation  simethicone 80 milliGRAM(s) Chew two times a day PRN Dyspepsia      Allergies    codeine (Hives)    Intolerances            Vital Signs Last 24 Hrs  T(C): 36.9 (30 Sep 2021 12:51), Max: 36.9 (29 Sep 2021 20:29)  T(F): 98.4 (30 Sep 2021 12:51), Max: 98.4 (29 Sep 2021 20:29)  HR: 73 (30 Sep 2021 11:00) (70 - 90)  BP: 103/53 (30 Sep 2021 06:00) (83/46 - 110/54)  BP(mean): 69 (30 Sep 2021 06:00) (58 - 72)  RR: 16 (30 Sep 2021 06:00) (16 - 18)  SpO2: 96% (30 Sep 2021 11:00) (95% - 100%)        LABS:                        8.3    10.07 )-----------( 472      ( 30 Sep 2021 08:59 )             27.6     30 Sep 2021 08:59    138    |  105    |  12     ----------------------------<  72     4.1     |  22     |  0.91     Ca    8.6        30 Sep 2021 08:59    TPro  6.7    /  Alb  1.9    /  TBili  0.4    /  DBili  x      /  AST  27     /  ALT  17     /  AlkPhos  107    30 Sep 2021 08:59        CAPILLARY BLOOD GLUCOSE      POCT Blood Glucose.: 138 mg/dL (30 Sep 2021 11:37)  POCT Blood Glucose.: 92 mg/dL (30 Sep 2021 06:35)  POCT Blood Glucose.: 119 mg/dL (29 Sep 2021 22:50)  POCT Blood Glucose.: 147 mg/dL (29 Sep 2021 17:27)        Cultures  Culture Results:   Moderate Pseudomonas aeruginosa (Carbapenem Resistant)  Moderate Serratia marcescens  Moderate Stenotrophomonas maltophilia  Normal Respiratory Elvira absent (09-26 @ 12:50)  Culture Results:   No growth to date. (09-25 @ 21:55)  Culture Results:   No growth to date. (09-25 @ 21:55)  Culture Results:   >100,000 CFU/ml Proteus mirabilis (09-25 @ 21:55)        Culture - Sputum (collected 09-26-21 @ 12:50)  Source: .Sputum Sputum  Gram Stain (09-26-21 @ 15:43):    Few polymorphonuclear leukocytes per low power field    Rare Squamous epithelial cells per low power field    Few Gram Negative Rods per oil power field  Final Report (09-28-21 @ 14:54):    Moderate Pseudomonas aeruginosa (Carbapenem Resistant)    Moderate Serratia marcescens    Moderate Stenotrophomonas maltophilia    Normal Respiratory Elvira absent  Organism: Pseudomonas aeruginosa (Carbapenem Resistant)  Serratia marcescens  Stenotrophomonas maltophilia (09-28-21 @ 14:54)  Organism: Stenotrophomonas maltophilia (09-28-21 @ 14:54)      -  Ceftazidime: R >16      -  Levofloxacin: S 1      -  Trimethoprim/Sulfamethoxazole: S <=0.5/9.5      Method Type: PRATIK  Organism: Serratia marcescens (09-28-21 @ 14:54)      -  Amikacin: S <=16      -  Amoxicillin/Clavulanic Acid: R >16/8      -  Ampicillin: R >16 These ampicillin results predict results for amoxicillin      -  Ampicillin/Sulbactam: R >16/8 Enterobacter, Citrobacter, and Serratia may develop resistance during prolonged therapy (3-4 days)      -  Aztreonam: S <=4      -  Cefazolin: R >16 Enterobacter, Citrobacter, and Serratia may develop resistance during prolonged therapy (3-4 days)      -  Cefepime: S <=2      -  Cefoxitin: R <=8      -  Ceftriaxone: I 2 Enterobacter, Citrobacter, and Serratia may develop resistance during prolonged therapy      -  Ciprofloxacin: R >2      -  Ertapenem: S <=0.5      -  Gentamicin: S <=2      -  Levofloxacin: R 2      -  Meropenem: S <=1      -  Piperacillin/Tazobactam: S <=8      -  Tobramycin: S <=2      -  Trimethoprim/Sulfamethoxazole: S <=0.5/9.5      Method Type: PRATIK  Organism: Pseudomonas aeruginosa (Carbapenem Resistant) (09-28-21 @ 14:54)      -  Amikacin: S <=16      -  Aztreonam: S <=4      -  Cefepime: S 4      -  Ceftazidime: S 8      -  Ciprofloxacin: S <=0.25      -  Gentamicin: S <=2      -  Imipenem: R >8      -  Levofloxacin: S <=0.5      -  Meropenem: I 4      -  Piperacillin/Tazobactam: S <=8      -  Tobramycin: S <=2      Method Type: PRATIK    Culture - Urine (collected 09-25-21 @ 21:55)  Source: Clean Catch Clean Catch (Midstream)  Final Report (09-29-21 @ 11:48):    >100,000 CFU/ml Proteus mirabilis  Organism: Proteus mirabilis (09-29-21 @ 11:48)  Organism: Proteus mirabilis (09-29-21 @ 11:48)      -  Amikacin: S <=16      -  Amoxicillin/Clavulanic Acid: S <=8/4      -  Ampicillin: S <=8 These ampicillin results predict results for amoxicillin      -  Ampicillin/Sulbactam: S <=4/2 Enterobacter, Citrobacter, and Serratia may develop resistance during prolonged therapy (3-4 days)      -  Aztreonam: S <=4      -  Cefazolin: S 4 ([id number]) For uncomplicated UTI with K. pneumoniae, E. coli, or P. mirablis: PRATIK <=16 is sensitive and PRATIK >=32 is resistant. This also predicts results for oral agents cefaclor, cefdinir, cefpodoxime, cefprozil, cefuroxime axetil, cephalexin and locarbef for uncomplicated UTI. Note that some isolates may be susceptible to these agents while testing resistant to cefazolin.      -  Cefepime: S <=2      -  Cefoxitin: S <=8      -  Ceftriaxone: S <=1 Enterobacter, Citrobacter, and Serratia may develop resistance during prolonged therapy      -  Ciprofloxacin: R >2      -  Ertapenem: S <=0.5      -  Gentamicin: S <=2      -  Levofloxacin: R 2      -  Meropenem: S <=1      -  Nitrofurantoin: R >64 Should not be used to treat pyelonephritis      -  Piperacillin/Tazobactam: S <=8      -  Tobramycin: S 4      -  Trimethoprim/Sulfamethoxazole: R >2/38      Method Type: PRATIK    Culture - Blood (collected 09-25-21 @ 21:55)  Source: .Blood Blood-Peripheral  Preliminary Report (09-26-21 @ 22:01):    No growth to date.    Culture - Blood (collected 09-25-21 @ 21:55)  Source: .Blood Blood-Peripheral  Preliminary Report (09-26-21 @ 22:01):    No growth to date.        RADIOLOGY & ADDITIONAL TESTS:    Imaging Personally Reviewed:  [ ] YES  [ ] NO    Consultant(s) Notes Reviewed:  [ ] YES  [ ] NO    Care Discussed with Consultants/Other Providers [ x] YES  [ ] NO

## 2021-09-30 NOTE — CONSULT NOTE ADULT - CONSULT REASON
Hypotension vent
ckd and manasa
Acute on chronic respiratory failure
dysp
sepsis
vent dep  GOC  code status
Wound Consult

## 2021-09-30 NOTE — PROGRESS NOTE ADULT - SUBJECTIVE AND OBJECTIVE BOX
Patient is a 75y Female whom presented to the hospital with ckd and manasa     PAST MEDICAL & SURGICAL HISTORY:  Dementia of frontal lobe type    Aphasic stroke    Diabetes mellitus    Respiratory failure    Hypertension    GERD (gastroesophageal reflux disease)    Constipation    Respiratory failure    CVA (cerebral vascular accident)    HTN (hypertension)    DM (diabetes mellitus)    Advanced dementia    COVID-19 virus detected    Quadriplegia    Pneumonia    Type II diabetes mellitus    Hx of appendectomy    Gastrostomy in place    Tracheostomy in place    Tracheostomy tube present    Feeding by G-tube        MEDICATIONS  (STANDING):  aspirin  chewable 81 milliGRAM(s) Oral daily  chlorhexidine 0.12% Liquid 15 milliLiter(s) Oral Mucosa every 12 hours  chlorhexidine 4% Liquid 1 Application(s) Topical <User Schedule>  dextrose 40% Gel 15 Gram(s) Oral once  dextrose 5%. 1000 milliLiter(s) (50 mL/Hr) IV Continuous <Continuous>  dextrose 5%. 1000 milliLiter(s) (100 mL/Hr) IV Continuous <Continuous>  dextrose 50% Injectable 25 Gram(s) IV Push once  dextrose 50% Injectable 12.5 Gram(s) IV Push once  dextrose 50% Injectable 25 Gram(s) IV Push once  glucagon  Injectable 1 milliGRAM(s) IntraMuscular once  heparin   Injectable 5000 Unit(s) SubCutaneous every 8 hours  insulin lispro (ADMELOG) corrective regimen sliding scale   SubCutaneous every 6 hours  ipratropium 17 MICROgram(s) HFA Inhaler 1 Puff(s) Inhalation every 6 hours  lactated ringers. 1000 milliLiter(s) (80 mL/Hr) IV Continuous <Continuous>  lactobacillus acidophilus 1 Tablet(s) Oral daily  norepinephrine Infusion 0.05 MICROgram(s)/kG/Min (5.74 mL/Hr) IV Continuous <Continuous>  pantoprazole  Injectable 40 milliGRAM(s) IV Push daily  piperacillin/tazobactam IVPB.. 3.375 Gram(s) IV Intermittent every 8 hours  senna 1 Tablet(s) Oral daily      Allergies    codeine (Hives)    Intolerances        SOCIAL HISTORY:  Denies ETOh,Smoking,     FAMILY HISTORY:  No pertinent family history in first degree relatives        REVIEW OF SYSTEMS:  unable to obtained a good review system                                                                                               8.3    10.07 )-----------( 472      ( 30 Sep 2021 08:59 )             27.6       CBC Full  -  ( 30 Sep 2021 08:59 )  WBC Count : 10.07 K/uL  RBC Count : 3.04 M/uL  Hemoglobin : 8.3 g/dL  Hematocrit : 27.6 %  Platelet Count - Automated : 472 K/uL  Mean Cell Volume : 90.8 fl  Mean Cell Hemoglobin : 27.3 pg  Mean Cell Hemoglobin Concentration : 30.1 gm/dL  Auto Neutrophil # : x  Auto Lymphocyte # : x  Auto Monocyte # : x  Auto Eosinophil # : x  Auto Basophil # : x  Auto Neutrophil % : x  Auto Lymphocyte % : x  Auto Monocyte % : x  Auto Eosinophil % : x  Auto Basophil % : x      09-30    138  |  105  |  12  ----------------------------<  72  4.1   |  22  |  0.91    Ca    8.6      30 Sep 2021 08:59    TPro  6.7  /  Alb  1.9<L>  /  TBili  0.4  /  DBili  x   /  AST  27  /  ALT  17  /  AlkPhos  107  09-30      CAPILLARY BLOOD GLUCOSE      POCT Blood Glucose.: 185 mg/dL (30 Sep 2021 17:02)  POCT Blood Glucose.: 138 mg/dL (30 Sep 2021 11:37)  POCT Blood Glucose.: 92 mg/dL (30 Sep 2021 06:35)  POCT Blood Glucose.: 119 mg/dL (29 Sep 2021 22:50)      Vital Signs Last 24 Hrs  T(C): 37 (30 Sep 2021 16:07), Max: 37 (30 Sep 2021 16:07)  T(F): 98.6 (30 Sep 2021 16:07), Max: 98.6 (30 Sep 2021 16:07)  HR: 78 (30 Sep 2021 17:28) (70 - 102)  BP: 110/54 (30 Sep 2021 14:00) (83/46 - 127/115)  BP(mean): 71 (30 Sep 2021 14:00) (58 - 121)  RR: 16 (30 Sep 2021 14:00) (14 - 38)  SpO2: 98% (30 Sep 2021 17:28) (93% - 100%)                 PHYSICAL EXAM:    Constitutional: NAD  HEENT: conjunctive   clear   Neck:  No JVD  Respiratory: decrease bs b/l   Cardiovascular: S1 and S2  Gastrointestinal: BS+, soft, pos peg   Extremities: No peripheral edema

## 2021-09-30 NOTE — PROGRESS NOTE ADULT - SUBJECTIVE AND OBJECTIVE BOX
[facility] DIVISION of INFECTIOUS DISEASE  Arturo Olivas MD PhD, Haylee Umanzor MD, Andressa Brantley MD, Billy Valenzuela MD  and providing coverage with Alexa Canas MD and Eusebio Chairez MD  Providing Infectious Disease Consultations at [facility], [facility]'s    Office# [phone] to schedule follow up appointments  Answering Service for urgent calls or New Consults [phone]  Cell# to text for urgent issues Arturo Olivas [phone]     infectious diseases progress note:    BREA BECKHAM is a 75y y. o. Female patient    No concerning overnight events    Allergies    codeine (Hives)    Intolerances        ANTIBIOTICS/RELEVANT:  antimicrobials  piperacillin/tazobactam IVPB.. 3.375 Gram(s) IV Intermittent every 8 hours    immunologic:    OTHER:  ALBUTerol    90 MICROgram(s) HFA Inhaler 1 Puff(s) Inhalation every 6 hours PRN  aspirin  chewable 81 milliGRAM(s) Oral daily  bisacodyl Suppository 10 milliGRAM(s) Rectal at bedtime  chlorhexidine 0.12% Liquid 15 milliLiter(s) Oral Mucosa every 12 hours  dextrose 40% Gel 15 Gram(s) Oral once  dextrose 5%. 1000 milliLiter(s) IV Continuous <Continuous>  dextrose 5%. 1000 milliLiter(s) IV Continuous <Continuous>  dextrose 50% Injectable 25 Gram(s) IV Push once  dextrose 50% Injectable 12.5 Gram(s) IV Push once  dextrose 50% Injectable 25 Gram(s) IV Push once  fentaNYL   Patch  12 MICROgram(s)/Hr 1 Patch Transdermal every 72 hours  glucagon  Injectable 1 milliGRAM(s) IntraMuscular once  heparin   Injectable 5000 Unit(s) SubCutaneous every 8 hours  insulin glargine Injectable (LANTUS) 36 Unit(s) SubCutaneous at bedtime  insulin lispro (ADMELOG) corrective regimen sliding scale   SubCutaneous every 6 hours  ipratropium 17 MICROgram(s) HFA Inhaler 1 Puff(s) Inhalation every 6 hours  lactobacillus acidophilus 1 Tablet(s) Oral daily  LORazepam     Tablet 1 milliGRAM(s) Oral every 6 hours  LORazepam   Injectable 1 milliGRAM(s) IV Push every 6 hours PRN  methocarbamol 250 milliGRAM(s) Oral two times a day  pantoprazole  Injectable 40 milliGRAM(s) IV Push daily  polyethylene glycol 3350 17 Gram(s) Oral every 12 hours PRN  senna 1 Tablet(s) Oral daily  simethicone 80 milliGRAM(s) Chew two times a day PRN  sucralfate suspension 1 Gram(s) Enteral Tube four times a day      Objective:  Vital Signs Last 24 Hrs  T(C): 36.5 (30 Sep 2021 04:04), Max: 36.9 (29 Sep 2021 20:29)  T(F): 97.7 (30 Sep 2021 04:04), Max: 98.4 (29 Sep 2021 20:29)  HR: 77 (30 Sep 2021 06:32) (69 - 99)  BP: 103/53 (30 Sep 2021 06:00) (83/46 - 130/69)  BP(mean): 69 (30 Sep 2021 06:00) (58 - 87)  RR: 16 (30 Sep 2021 06:00) (16 - 33)  SpO2: 97% (30 Sep 2021 06:32) (95% - 100%)    T(C): 36.5 (09-30-21 @ 04:04), Max: 37 (09-28-21 @ 12:30)  T(C): 36.5 (09-30-21 @ 04:04), Max: 37.1 (09-27-21 @ 12:56)  T(C): 36.5 (09-30-21 @ 04:04), Max: 37.1 (09-27-21 @ 08:22)    PHYSICAL EXAM:  HEENT: NC atraumatic  Neck: supple  Respiratory: no accessory muscle use, breathing comfortably  Cardiovascular: distant  Gastrointestinal: normal appearing, nondistended  Extremities: no clubbing, no cyanosis,    Mode: PRVC, RR (machine): 18, TV (machine): 400, FiO2: 30, PEEP: 8, ITime: 1, MAP: 17, PIP: 31    LABS:                          8.3    10.07 )-----------( 472      ( 30 Sep 2021 08:59 )             27.6       10.07 09-30 @ 08:59  7.44 09-29 @ 07:08  7.82 09-27 @ 12:08  6.92 09-27 @ 05:45  9.62 09-26 @ 06:41  13.61 09-25 @ 15:01      09-29    141  |  106  |  13  ----------------------------<  103<H>  3.8   |  26  |  0.92    Ca    8.5      29 Sep 2021 07:08    TPro  6.3  /  Alb  2.0<L>  /  TBili  0.4  /  DBili  x   /  AST  10  /  ALT  18  /  AlkPhos  106  09-29      Creatinine, Serum: 0.92 mg/dL (09-29-21 @ 07:08)  Creatinine, Serum: 0.93 mg/dL (09-27-21 @ 05:45)  Creatinine, Serum: 1.00 mg/dL (09-26-21 @ 06:41)  Creatinine, Serum: 1.31 mg/dL (09-25-21 @ 15:02)                INFLAMMATORY MARKERS  Auto Neutrophil #: 5.09 K/uL (09-26-21 @ 06:41)  Auto Lymphocyte #: 3.03 K/uL (09-26-21 @ 06:41)  Auto Lymphocyte #: 1.99 K/uL (09-25-21 @ 15:01)  Auto Neutrophil #: 10.22 K/uL (09-25-21 @ 15:01)    Lactate, Blood: 0.9 mmol/L (09-27-21 @ 05:45)  Lactate, Blood: 3.7 mmol/L (09-26-21 @ 06:41)  Lactate, Blood: 1.4 mmol/L (09-25-21 @ 17:03)  Lactate, Blood: 5.8 mmol/L (09-25-21 @ 15:01)    Auto Eosinophil #: 0.30 K/uL (09-26-21 @ 06:41)  Auto Eosinophil #: 0.08 K/uL (09-25-21 @ 15:01)        Procalcitonin, Serum: 2.11 ng/mL (09-26-21 @ 12:36)                  INR: 1.12 ratio (09-27-21 @ 05:45)  INR: 1.12 ratio (09-26-21 @ 06:41)  Activated Partial Thromboplastin Time: 45.0 sec (09-25-21 @ 15:02)  INR: 1.18 ratio (09-25-21 @ 15:02)          MICROBIOLOGY:              RADIOLOGY & ADDITIONAL STUDIES:

## 2021-09-30 NOTE — PROGRESS NOTE ADULT - ASSESSMENT
The patient is a 75 year old female with a history of HTN, DM, CVA, dementia, chronic respiratory failure s/p trach, PEG, recent cardiac arrest who presents with acute on chronic respiratory failure.     9/30/21  Seen at [facility] ICU  Patient sleeping, lying flat comfortably  Monitor-NSR    Plan:  - CXR and CT chest with bilateral infiltrates, left greater than right, and bilateral pleural effusions  - Echo last admission with normal LV systolic function, mild pulm HTN  - Aspirin on hold due to prior anemia and bleed  - On zosyn  - Mechanical ventilation  - Being followed by Palliative care, Pulmonary, Neurology, ID  - Overall poor prognosis

## 2021-09-30 NOTE — PROGRESS NOTE ADULT - ASSESSMENT
76 y/o female with a PMHx of DM2, pna, quadriplegia, advanced dementia, DM, CVA, GERD, HTN presents to the ED c/o recent admitted to [facility] a 9/7-9/15 after being found unresponsive at nursing home, concerned about post operative distress, hypotensive signs of aspirational pna, with volume dissipated, treated with levosa, zosyn. Admitted to ICU for aspirational pna. Urinary culture proteus, which was also sensitive to Zosyn. Also appears to have paronychia of toe which she had Zyvox during hospital stay. She was found to be anemic so she was transfused of 1 L RBC, no signs of active bleeding and pt stabilized. Pt was stabilized and d/c back to rehab. Patient reported for reported respiratory distress. Pt found to be on normal vent settings, does have increased  respiratory rate. No further hx can be obtained at this time as pt is nonverbal.  sepsis - shock - chr resp failure - anemia - acute infection - pna - dysphagia - anoxia - dementia      remains concerned about clinical status and dc planning -     on ABX  HOB elev  oral hygiene  skin care  assist with ADL  full code - spoke with  - HCP  cx in progress  ABG noted  vent support  monitor VS and HD and Sat  CCM notes reviewed  vent support in progress - not a candidate for weaning  old records reviewed

## 2021-09-30 NOTE — CONSULT NOTE ADULT - ASSESSMENT
Patient presents with:     1. Right hallux 1.5 x 3 dry stable eschar periwound intact no erythema +DP    2. Right 4th 2 x 1and 5th 2 x 1.5 ipj dry stable eschar periwound intact no erythema    3. Left hallux 2 x 2 dry stable eschar periwound intact no erythema +DP    Recommendation: Paint dry stable eschar with betadine daily and off load in CAIR boots at all times while in bed        
    CHAPINCITO GUIDRY 75 f [facility] S 9/24/2021   DR ILIANA KEMP     Initial evaluation/Pulmonary Critical Care consultation requested on 9/25/2021  by Dr    from Dr Sandoval   Patient examined chart reviewed    HOSPITAL ADMISSION   PATIENT CAME  FROM (if information available)      CHAPINCITO GUIDRY 75 f [facility] S 9/24/2021   DR ILIANA KEMP     REVIEW OF SYMPTOMS      Able to give (reliable) ROS  NO     PHYSICAL EXAM    HEENT Unremarkable  atraumatic   RESP Fair air entry EXP prolonged    Harsh breath sound Resp distres mild   CARDIAC S1 S2 No S3     NO JVD    ABDOMEN SOFT BS PRESENT NOT DISTENDED No hepatosplenomegaly   PEDAL EDEMA present No calf tenderness  NO rash                                           PATIENT PRESENTATION.  74 f PMH OBS cva chr vdrf peg past ho vap past ho pseudomonas sp recently  admitted 9/7-9/15 sp cac asp pneu  Rxed with zosyn  Shock and lacticemia poa resolvd   Had sz 9/9 Noted to have anemia   Has toe lesion seen by podiatry 9/10  Patient now readmitted 9/25/2021 with resp distress   On arrival 120/57 116 24 o vent        GENERAL ISSUES  GOC ADVANCED DIRECTIVE.                                         ALLERGY.  codeine                            WEIGHT.      9/25/2021 61                              BMI.                 9/25/2021 22       PATIENT DATA   TUBES  PROCEDURES.     poa Trach  poa peg    ABG.     OXYGENATION.                   VITALS/IO/VENT/DRIPS.     9/25/2021 120/57 116 24       PROBLEM ASSESSMENT/RECOMMENDATIONS.  COVID STATUS.  scv2 9/25/2021 (-0   RESP.  VDRF.   Lung protective ventilation  target po 90-95%  INFECTION.  VAP.   Pssible UTI.  W 9/25/2021 w 13  ua 9/25/2021 w 26-50 l estrase mod   ct cap 9/25/2021 dense bl cpnsolidations new mediastinal lne Small bl effs dilated fluid filled rectum   rvp 9/25/2021 (-)   Started meropenem (9/25/2021) Vanco (9/25/2021) pending cult  HEMODYNAMICS.   Bp 120/50 poa  target map 65 (+)  LACTICEMIA.   la 9/25-9/25 la 5.8-1.4  iv fluids   ANEMIA.  Hb 9/25/2021 hb 10   RYAN.  Cr 9/25/2021 cr 1.3  ELEVATED LFTS.  LFTS 9/25/2021  ap 170  ast 41  alt 33       OVERALL PLAN.  VDRF anoxic encephalopathy patient  admitted 9/25/2021 with VAP UTI resp distress started on bsab        TIME SPENT   Over 55 minutes aggregate critical care time spent on encounter; activities included   direct patient care, counseling and/or coordinating care reviewing notes, lab data/ imaging , discussion with multidisciplinary team/ patient  /family and explaining in detail risks, benefits, alternatives  of the recommendations     CHAPINCITO GUIDRY 75 f [facility] S 9/24/2021   DR ILIANA KEMP     
Patient is a 75 year old female with PMH of HTN, DM type 2, CVA, recent Cardiac Arrest, Chronic Respiratory Failure, Vent Dependant s/p trach & PEG, Anemia with GI blood loss, and Dementia who was sent from [facility] facility for acute respiratory distress  Sepsis due to recurrent ventilator associated pneumonia vs aspiration pneumonia, possible UTI    - fever 100.6F yesterday, now afebrile. Leukocytosis normalized. Sepsis resolved.    - trach to vent, FiO2 50%.     - CT imaging reviewed, new dense b/l lung consolidations, mediastinal lad, small b/l effusions and atelectasis    - RVP/COVID and legionella urine ag negative    - UA with pyuria, has vaughn in place with yellow urine; prior cx reviewed colonized with Proteus    - s/p vancomycin and pip-tazo, continued on pip-tazo  Acute on chronic respiratory distress due to above  RYAN, nephrology following    Recommendations:   Follow blood, sputum and urine cultures  Continue on pip-tazo  Send MRSA PCR screen, prior negative on 9/7  Would hold off on re-dosing vancomycin at this time   Pulmonary and Cardiology following  Palliative following  Aspiration precautions  Monitor temps/WBC      Billy Valenzuela M.D.  [facility], Division of Infectious Diseases  [phone]  After 5pm on weekdays and all day on weekends - please call [phone]
75 year old female with a history of HTN, DM, CVA, dementia, chronic respiratory failure s/p trach, PEG, recent cardiac arrest who presents with acute on chronic respiratory failure. The patient is unable to provide any history to me due to advanced dementia and trach. She was found to have respiratory distress at NH so she was sent to ED. Hypoxic in NH was titrated up to 100%. Pt had been agitated in ER was given 0.5 ativan in ER. Pt agitation resolved   Pt trach to vent at this time with new seemingly aspiration/multifacial PNA. Pt in Er was normotensive then recently began to drop BP, additional fluid bolus was given and will start on levophed to maintain MAP>65       ACUTE RENAL FAILURE: lactated ringers. 1000 milliLiter(s) (80 mL/Hr) IV Continuous <  Serum creatinine is 1.3  There is no progression . No uremic symptoms  No evidence of anemia .  Fluid status stable.  Will continue to avoid nephrotoxic drugs.  Patient remains asymptomatic.   Continue current therapy.  hold  diuretic.  hold   ACE inhibitor.  hold   ARB.  Additional evaluation:   ECG,    echocardiogram,     CXR,  will obtained recent   renal ultrasound to evalaute kidney size and possible stones , if cr is not improving   
The patient is a 75 year old female with a history of HTN, DM, CVA, dementia, chronic respiratory failure s/p trach, PEG, recent cardiac arrest who presents with acute on chronic respiratory failure.     Plan:  - Check ECG  - CXR and CT chest with bilateral infiltrates, left greater than right, and bilateral pleural effusions  - Echo last admission with normal LV systolic function, mild pulm HTN  - Aspirin on hold due to prior anemia and bleed  - Received zosyn and vancomycin  - Mild RYAN with Cr 1.3 and lactate 5.3 noted  - Received IV fluids  - Ok to continue IV fluids if needed  - Mechanical ventilation  - ICU care  - Overall poor prognosis - comfort care would be most appropriate

## 2021-10-01 LAB
ANION GAP SERPL CALC-SCNC: 6 MMOL/L — SIGNIFICANT CHANGE UP (ref 5–17)
BASOPHILS # BLD AUTO: 0.03 K/UL — SIGNIFICANT CHANGE UP (ref 0–0.2)
BASOPHILS NFR BLD AUTO: 0.3 % — SIGNIFICANT CHANGE UP (ref 0–2)
BUN SERPL-MCNC: 10 MG/DL — SIGNIFICANT CHANGE UP (ref 7–23)
CALCIUM SERPL-MCNC: 8.7 MG/DL — SIGNIFICANT CHANGE UP (ref 8.4–10.5)
CHLORIDE SERPL-SCNC: 106 MMOL/L — SIGNIFICANT CHANGE UP (ref 96–108)
CO2 SERPL-SCNC: 26 MMOL/L — SIGNIFICANT CHANGE UP (ref 22–31)
CREAT SERPL-MCNC: 1.02 MG/DL — SIGNIFICANT CHANGE UP (ref 0.5–1.3)
EOSINOPHIL # BLD AUTO: 0.12 K/UL — SIGNIFICANT CHANGE UP (ref 0–0.5)
EOSINOPHIL NFR BLD AUTO: 1.3 % — SIGNIFICANT CHANGE UP (ref 0–6)
GLUCOSE SERPL-MCNC: 155 MG/DL — HIGH (ref 70–99)
HCT VFR BLD CALC: 29.6 % — LOW (ref 34.5–45)
HGB BLD-MCNC: 8.7 G/DL — LOW (ref 11.5–15.5)
IMM GRANULOCYTES NFR BLD AUTO: 1 % — SIGNIFICANT CHANGE UP (ref 0–1.5)
LYMPHOCYTES # BLD AUTO: 1.02 K/UL — SIGNIFICANT CHANGE UP (ref 1–3.3)
LYMPHOCYTES # BLD AUTO: 11.3 % — LOW (ref 13–44)
MCHC RBC-ENTMCNC: 27.3 PG — SIGNIFICANT CHANGE UP (ref 27–34)
MCHC RBC-ENTMCNC: 29.4 GM/DL — LOW (ref 32–36)
MCV RBC AUTO: 92.8 FL — SIGNIFICANT CHANGE UP (ref 80–100)
MONOCYTES # BLD AUTO: 0.64 K/UL — SIGNIFICANT CHANGE UP (ref 0–0.9)
MONOCYTES NFR BLD AUTO: 7.1 % — SIGNIFICANT CHANGE UP (ref 2–14)
NEUTROPHILS # BLD AUTO: 7.15 K/UL — SIGNIFICANT CHANGE UP (ref 1.8–7.4)
NEUTROPHILS NFR BLD AUTO: 79 % — HIGH (ref 43–77)
NRBC # BLD: 0 /100 WBCS — SIGNIFICANT CHANGE UP (ref 0–0)
PLATELET # BLD AUTO: 437 K/UL — HIGH (ref 150–400)
POTASSIUM SERPL-MCNC: 4 MMOL/L — SIGNIFICANT CHANGE UP (ref 3.5–5.3)
POTASSIUM SERPL-SCNC: 4 MMOL/L — SIGNIFICANT CHANGE UP (ref 3.5–5.3)
RBC # BLD: 3.19 M/UL — LOW (ref 3.8–5.2)
RBC # FLD: 19.6 % — HIGH (ref 10.3–14.5)
SODIUM SERPL-SCNC: 138 MMOL/L — SIGNIFICANT CHANGE UP (ref 135–145)
WBC # BLD: 9.05 K/UL — SIGNIFICANT CHANGE UP (ref 3.8–10.5)
WBC # FLD AUTO: 9.05 K/UL — SIGNIFICANT CHANGE UP (ref 3.8–10.5)

## 2021-10-01 PROCEDURE — 99233 SBSQ HOSP IP/OBS HIGH 50: CPT

## 2021-10-01 RX ORDER — MINERAL OIL
133 OIL (ML) MISCELLANEOUS ONCE
Refills: 0 | Status: COMPLETED | OUTPATIENT
Start: 2021-10-01 | End: 2021-10-01

## 2021-10-01 RX ADMIN — Medication 1 MILLIGRAM(S): at 05:43

## 2021-10-01 RX ADMIN — METHOCARBAMOL 250 MILLIGRAM(S): 500 TABLET, FILM COATED ORAL at 05:41

## 2021-10-01 RX ADMIN — Medication 1 GRAM(S): at 23:50

## 2021-10-01 RX ADMIN — Medication 1 MILLIGRAM(S): at 19:58

## 2021-10-01 RX ADMIN — SENNA PLUS 1 TABLET(S): 8.6 TABLET ORAL at 12:02

## 2021-10-01 RX ADMIN — Medication 2: at 12:12

## 2021-10-01 RX ADMIN — HEPARIN SODIUM 5000 UNIT(S): 5000 INJECTION INTRAVENOUS; SUBCUTANEOUS at 14:21

## 2021-10-01 RX ADMIN — Medication 1 APPLICATION(S): at 14:21

## 2021-10-01 RX ADMIN — PANTOPRAZOLE SODIUM 40 MILLIGRAM(S): 20 TABLET, DELAYED RELEASE ORAL at 13:03

## 2021-10-01 RX ADMIN — Medication 10 MILLIGRAM(S): at 22:18

## 2021-10-01 RX ADMIN — HEPARIN SODIUM 5000 UNIT(S): 5000 INJECTION INTRAVENOUS; SUBCUTANEOUS at 05:41

## 2021-10-01 RX ADMIN — Medication 2: at 05:41

## 2021-10-01 RX ADMIN — FENTANYL CITRATE 1 PATCH: 50 INJECTION INTRAVENOUS at 06:45

## 2021-10-01 RX ADMIN — Medication 2: at 23:51

## 2021-10-01 RX ADMIN — PIPERACILLIN AND TAZOBACTAM 25 GRAM(S): 4; .5 INJECTION, POWDER, LYOPHILIZED, FOR SOLUTION INTRAVENOUS at 05:43

## 2021-10-01 RX ADMIN — Medication 1 GRAM(S): at 17:06

## 2021-10-01 RX ADMIN — Medication 1 PUFF(S): at 19:42

## 2021-10-01 RX ADMIN — Medication 1 PUFF(S): at 06:51

## 2021-10-01 RX ADMIN — Medication 1 MILLIGRAM(S): at 04:45

## 2021-10-01 RX ADMIN — ALBUTEROL 1 PUFF(S): 90 AEROSOL, METERED ORAL at 02:11

## 2021-10-01 RX ADMIN — INSULIN GLARGINE 36 UNIT(S): 100 INJECTION, SOLUTION SUBCUTANEOUS at 23:11

## 2021-10-01 RX ADMIN — Medication 1 MILLIGRAM(S): at 17:05

## 2021-10-01 RX ADMIN — Medication 133 MILLILITER(S): at 17:05

## 2021-10-01 RX ADMIN — Medication 1 GRAM(S): at 12:02

## 2021-10-01 RX ADMIN — Medication 1 MILLIGRAM(S): at 23:50

## 2021-10-01 RX ADMIN — PIPERACILLIN AND TAZOBACTAM 25 GRAM(S): 4; .5 INJECTION, POWDER, LYOPHILIZED, FOR SOLUTION INTRAVENOUS at 22:17

## 2021-10-01 RX ADMIN — ALBUTEROL 1 PUFF(S): 90 AEROSOL, METERED ORAL at 19:42

## 2021-10-01 RX ADMIN — PIPERACILLIN AND TAZOBACTAM 25 GRAM(S): 4; .5 INJECTION, POWDER, LYOPHILIZED, FOR SOLUTION INTRAVENOUS at 14:21

## 2021-10-01 RX ADMIN — Medication 1 TABLET(S): at 12:02

## 2021-10-01 RX ADMIN — Medication 1 PUFF(S): at 02:12

## 2021-10-01 RX ADMIN — Medication 1 GRAM(S): at 05:41

## 2021-10-01 RX ADMIN — FENTANYL CITRATE 1 PATCH: 50 INJECTION INTRAVENOUS at 23:12

## 2021-10-01 RX ADMIN — FENTANYL CITRATE 1 PATCH: 50 INJECTION INTRAVENOUS at 23:50

## 2021-10-01 RX ADMIN — FENTANYL CITRATE 1 PATCH: 50 INJECTION INTRAVENOUS at 19:59

## 2021-10-01 RX ADMIN — HEPARIN SODIUM 5000 UNIT(S): 5000 INJECTION INTRAVENOUS; SUBCUTANEOUS at 22:17

## 2021-10-01 RX ADMIN — Medication 1 MILLIGRAM(S): at 12:02

## 2021-10-01 RX ADMIN — CHLORHEXIDINE GLUCONATE 15 MILLILITER(S): 213 SOLUTION TOPICAL at 17:04

## 2021-10-01 RX ADMIN — Medication 81 MILLIGRAM(S): at 12:03

## 2021-10-01 RX ADMIN — METHOCARBAMOL 250 MILLIGRAM(S): 500 TABLET, FILM COATED ORAL at 17:05

## 2021-10-01 RX ADMIN — Medication 1 PUFF(S): at 13:31

## 2021-10-01 RX ADMIN — CHLORHEXIDINE GLUCONATE 15 MILLILITER(S): 213 SOLUTION TOPICAL at 05:41

## 2021-10-01 NOTE — PROGRESS NOTE ADULT - SUBJECTIVE AND OBJECTIVE BOX
F/U Note:    75y Female with chronic resp failure S/p[ trach/PEG after  CVA, admitted with fever, resp distress. Resolved post suction of mucous plug.         Vital Signs Last 24 Hrs  T(C): 36.1 (01 Oct 2021 21:16), Max: 37.1 (01 Oct 2021 06:00)  T(F): 97 (01 Oct 2021 21:16), Max: 98.8 (01 Oct 2021 06:00)  HR: 82 (01 Oct 2021 20:00) (68 - 87)  BP: 139/60 (01 Oct 2021 20:00) (107/49 - 139/60)  BP(mean): 84 (01 Oct 2021 20:00) (66 - 85)  RR: 33 (01 Oct 2021 20:00) (17 - 35)  SpO2: 98% (01 Oct 2021 20:00) (97% - 100%)                            8.7    9.05  )-----------( 437      ( 01 Oct 2021 06:56 )             29.6         10-01    138  |  106  |  10  ----------------------------<  155<H>  4.0   |  26  |  1.02    Ca    8.7      01 Oct 2021 06:56    TPro  6.7  /  Alb  1.9<L>  /  TBili  0.4  /  DBili  x   /  AST  27  /  ALT  17  /  AlkPhos  107  09-30    Mode: prvc  RR (machine): 18  TV (machine): 400  FiO2: 30  PEEP: 5  ITime: 0.9  MAP: 19  PIP: 43            NEURO: Not interactive  CV: (+) S1/S2, rrr, no mrg  RESP: CTA b/l  GI: soft, non tender

## 2021-10-01 NOTE — PROGRESS NOTE ADULT - SUBJECTIVE AND OBJECTIVE BOX
Patient is a 75y Female whom presented to the hospital with ckd and manasa     PAST MEDICAL & SURGICAL HISTORY:  Dementia of frontal lobe type    Aphasic stroke    Diabetes mellitus    Respiratory failure    Hypertension    GERD (gastroesophageal reflux disease)    Constipation    Respiratory failure    CVA (cerebral vascular accident)    HTN (hypertension)    DM (diabetes mellitus)    Advanced dementia    COVID-19 virus detected    Quadriplegia    Pneumonia    Type II diabetes mellitus    Hx of appendectomy    Gastrostomy in place    Tracheostomy in place    Tracheostomy tube present    Feeding by G-tube        MEDICATIONS  (STANDING):  aspirin  chewable 81 milliGRAM(s) Oral daily  chlorhexidine 0.12% Liquid 15 milliLiter(s) Oral Mucosa every 12 hours  chlorhexidine 4% Liquid 1 Application(s) Topical <User Schedule>  dextrose 40% Gel 15 Gram(s) Oral once  dextrose 5%. 1000 milliLiter(s) (50 mL/Hr) IV Continuous <Continuous>  dextrose 5%. 1000 milliLiter(s) (100 mL/Hr) IV Continuous <Continuous>  dextrose 50% Injectable 25 Gram(s) IV Push once  dextrose 50% Injectable 12.5 Gram(s) IV Push once  dextrose 50% Injectable 25 Gram(s) IV Push once  glucagon  Injectable 1 milliGRAM(s) IntraMuscular once  heparin   Injectable 5000 Unit(s) SubCutaneous every 8 hours  insulin lispro (ADMELOG) corrective regimen sliding scale   SubCutaneous every 6 hours  ipratropium 17 MICROgram(s) HFA Inhaler 1 Puff(s) Inhalation every 6 hours  lactated ringers. 1000 milliLiter(s) (80 mL/Hr) IV Continuous <Continuous>  lactobacillus acidophilus 1 Tablet(s) Oral daily  norepinephrine Infusion 0.05 MICROgram(s)/kG/Min (5.74 mL/Hr) IV Continuous <Continuous>  pantoprazole  Injectable 40 milliGRAM(s) IV Push daily  piperacillin/tazobactam IVPB.. 3.375 Gram(s) IV Intermittent every 8 hours  senna 1 Tablet(s) Oral daily      Allergies    codeine (Hives)    Intolerances        SOCIAL HISTORY:  Denies ETOh,Smoking,     FAMILY HISTORY:  No pertinent family history in first degree relatives        REVIEW OF SYSTEMS:  unable to obtained a good review system                                                                                                                     8.7    9.05  )-----------( 437      ( 01 Oct 2021 06:56 )             29.6       CBC Full  -  ( 01 Oct 2021 06:56 )  WBC Count : 9.05 K/uL  RBC Count : 3.19 M/uL  Hemoglobin : 8.7 g/dL  Hematocrit : 29.6 %  Platelet Count - Automated : 437 K/uL  Mean Cell Volume : 92.8 fl  Mean Cell Hemoglobin : 27.3 pg  Mean Cell Hemoglobin Concentration : 29.4 gm/dL  Auto Neutrophil # : 7.15 K/uL  Auto Lymphocyte # : 1.02 K/uL  Auto Monocyte # : 0.64 K/uL  Auto Eosinophil # : 0.12 K/uL  Auto Basophil # : 0.03 K/uL  Auto Neutrophil % : 79.0 %  Auto Lymphocyte % : 11.3 %  Auto Monocyte % : 7.1 %  Auto Eosinophil % : 1.3 %  Auto Basophil % : 0.3 %      10-01    138  |  106  |  10  ----------------------------<  155<H>  4.0   |  26  |  1.02    Ca    8.7      01 Oct 2021 06:56    TPro  6.7  /  Alb  1.9<L>  /  TBili  0.4  /  DBili  x   /  AST  27  /  ALT  17  /  AlkPhos  107  09-30      CAPILLARY BLOOD GLUCOSE      POCT Blood Glucose.: 141 mg/dL (01 Oct 2021 17:03)  POCT Blood Glucose.: 155 mg/dL (01 Oct 2021 11:58)  POCT Blood Glucose.: 163 mg/dL (01 Oct 2021 05:30)  POCT Blood Glucose.: 175 mg/dL (30 Sep 2021 23:09)      Vital Signs Last 24 Hrs  T(C): 36.9 (01 Oct 2021 16:17), Max: 37.2 (30 Sep 2021 20:00)  T(F): 98.5 (01 Oct 2021 16:17), Max: 99 (30 Sep 2021 20:00)  HR: 76 (01 Oct 2021 17:36) (68 - 91)  BP: 120/68 (01 Oct 2021 16:00) (107/49 - 144/54)  BP(mean): 85 (01 Oct 2021 16:00) (66 - 85)  RR: 21 (01 Oct 2021 16:00) (17 - 35)  SpO2: 99% (01 Oct 2021 17:36) (97% - 100%)               PHYSICAL EXAM:    Constitutional: NAD  HEENT: conjunctive   clear   Neck:  No JVD  Respiratory: decrease bs b/l   Cardiovascular: S1 and S2  Gastrointestinal: BS+, soft, pos peg   Extremities: No peripheral edema

## 2021-10-01 NOTE — PROGRESS NOTE ADULT - PROBLEM SELECTOR PLAN 1
recurrent, versus vent associated PNA, start on aspiration precautions, patient was started on Zosyn   vanco dc'ed.    ID recs appreciated  Continue on pip-tazo (started late 9/25)   sputum cultue peudomonas and serratia , d/w ID continue present management.

## 2021-10-01 NOTE — PROGRESS NOTE ADULT - SUBJECTIVE AND OBJECTIVE BOX
[facility] DIVISION of INFECTIOUS DISEASE  Arturo Olivas MD PhD, Haylee Umanzor MD, Andressa Brantley MD, Billy Valenzuela MD, Emil Medellin MD  and providing coverage with Alexa Canas MD and Eusebio Chairez MD  Providing Infectious Disease Consultations at [facility], [facility]    Office# [phone] to schedule follow up appointments  Answering Service for urgent calls or New Consults [phone]  Cell# to text for urgent issues Arturo Olivas [phone]     infectious diseases progress note:    BREA BECKHAM is a 75y y. o. Female patient    No concerning overnight events    Allergies    codeine (Hives)    Intolerances        ANTIBIOTICS/RELEVANT:  antimicrobials  piperacillin/tazobactam IVPB.. 3.375 Gram(s) IV Intermittent every 8 hours    immunologic:    OTHER:  ALBUTerol    90 MICROgram(s) HFA Inhaler 1 Puff(s) Inhalation every 6 hours PRN  aspirin  chewable 81 milliGRAM(s) Oral daily  bisacodyl Suppository 10 milliGRAM(s) Rectal at bedtime  chlorhexidine 0.12% Liquid 15 milliLiter(s) Oral Mucosa every 12 hours  dextrose 40% Gel 15 Gram(s) Oral once  dextrose 5%. 1000 milliLiter(s) IV Continuous <Continuous>  dextrose 5%. 1000 milliLiter(s) IV Continuous <Continuous>  dextrose 50% Injectable 25 Gram(s) IV Push once  dextrose 50% Injectable 12.5 Gram(s) IV Push once  dextrose 50% Injectable 25 Gram(s) IV Push once  fentaNYL   Patch  12 MICROgram(s)/Hr 1 Patch Transdermal every 72 hours  glucagon  Injectable 1 milliGRAM(s) IntraMuscular once  heparin   Injectable 5000 Unit(s) SubCutaneous every 8 hours  insulin glargine Injectable (LANTUS) 36 Unit(s) SubCutaneous at bedtime  insulin lispro (ADMELOG) corrective regimen sliding scale   SubCutaneous every 6 hours  ipratropium 17 MICROgram(s) HFA Inhaler 1 Puff(s) Inhalation every 6 hours  lactobacillus acidophilus 1 Tablet(s) Oral daily  LORazepam     Tablet 1 milliGRAM(s) Oral every 6 hours  LORazepam   Injectable 1 milliGRAM(s) IV Push every 6 hours PRN  methocarbamol 250 milliGRAM(s) Oral two times a day  pantoprazole  Injectable 40 milliGRAM(s) IV Push daily  polyethylene glycol 3350 17 Gram(s) Oral every 12 hours PRN  povidone iodine 10% Solution 1 Application(s) Topical daily  senna 1 Tablet(s) Oral daily  simethicone 80 milliGRAM(s) Chew two times a day PRN  sucralfate suspension 1 Gram(s) Enteral Tube four times a day      Objective:  Vital Signs Last 24 Hrs  T(C): 37.1 (01 Oct 2021 06:00), Max: 37.2 (30 Sep 2021 20:00)  T(F): 98.8 (01 Oct 2021 06:00), Max: 99 (30 Sep 2021 20:00)  HR: 82 (01 Oct 2021 08:00) (73 - 102)  BP: 123/51 (01 Oct 2021 08:00) (107/53 - 144/54)  BP(mean): 73 (01 Oct 2021 08:00) (66 - 121)  RR: 26 (01 Oct 2021 08:00) (14 - 38)  SpO2: 100% (01 Oct 2021 08:00) (93% - 100%)    T(C): 37.1 (10-01-21 @ 06:00), Max: 37.2 (09-30-21 @ 20:00)  T(C): 37.1 (10-01-21 @ 06:00), Max: 37.2 (09-30-21 @ 20:00)  T(C): 37.1 (10-01-21 @ 06:00), Max: 37.2 (09-30-21 @ 20:00)    PHYSICAL EXAM:  HEENT: NC atraumatic  Neck: supple, intubated via trach  Respiratory: no accessory muscle use, breathing comfortably  Cardiovascular: distant  Gastrointestinal: normal appearing, nondistended  Extremities: no clubbing, no cyanosis,    Mode: PRVC, RR (machine): 18, TV (machine): 400, FiO2: 30, PEEP: 5, ITime: 1, MAP: 14, PIP: 28    LABS:                          8.7    9.05  )-----------( 437      ( 01 Oct 2021 06:56 )             29.6       9.05 10-01 @ 06:56  10.07 09-30 @ 08:59  7.44 09-29 @ 07:08  7.82 09-27 @ 12:08  6.92 09-27 @ 05:45  9.62 09-26 @ 06:41  13.61 09-25 @ 15:01      10-01    138  |  106  |  10  ----------------------------<  155<H>  4.0   |  26  |  1.02    Ca    8.7      01 Oct 2021 06:56    TPro  6.7  /  Alb  1.9<L>  /  TBili  0.4  /  DBili  x   /  AST  27  /  ALT  17  /  AlkPhos  107  09-30      Creatinine, Serum: 1.02 mg/dL (10-01-21 @ 06:56)  Creatinine, Serum: 0.91 mg/dL (09-30-21 @ 08:59)  Creatinine, Serum: 0.92 mg/dL (09-29-21 @ 07:08)  Creatinine, Serum: 0.93 mg/dL (09-27-21 @ 05:45)  Creatinine, Serum: 1.00 mg/dL (09-26-21 @ 06:41)  Creatinine, Serum: 1.31 mg/dL (09-25-21 @ 15:02)                INFLAMMATORY MARKERS  Auto Lymphocyte #: 1.02 K/uL (10-01-21 @ 06:56)  Auto Neutrophil #: 7.15 K/uL (10-01-21 @ 06:56)  Auto Neutrophil #: 5.09 K/uL (09-26-21 @ 06:41)  Auto Lymphocyte #: 3.03 K/uL (09-26-21 @ 06:41)  Auto Lymphocyte #: 1.99 K/uL (09-25-21 @ 15:01)  Auto Neutrophil #: 10.22 K/uL (09-25-21 @ 15:01)    Lactate, Blood: 0.9 mmol/L (09-27-21 @ 05:45)  Lactate, Blood: 3.7 mmol/L (09-26-21 @ 06:41)  Lactate, Blood: 1.4 mmol/L (09-25-21 @ 17:03)  Lactate, Blood: 5.8 mmol/L (09-25-21 @ 15:01)    Auto Eosinophil #: 0.12 K/uL (10-01-21 @ 06:56)  Auto Eosinophil #: 0.30 K/uL (09-26-21 @ 06:41)  Auto Eosinophil #: 0.08 K/uL (09-25-21 @ 15:01)        Procalcitonin, Serum: 2.11 ng/mL (09-26-21 @ 12:36)                  INR: 1.12 ratio (09-27-21 @ 05:45)  INR: 1.12 ratio (09-26-21 @ 06:41)  Activated Partial Thromboplastin Time: 45.0 sec (09-25-21 @ 15:02)  INR: 1.18 ratio (09-25-21 @ 15:02)          MICROBIOLOGY:              RADIOLOGY & ADDITIONAL STUDIES:

## 2021-10-01 NOTE — PROGRESS NOTE ADULT - SUBJECTIVE AND OBJECTIVE BOX
Chief Complaint: Respiratory distress    Interval Events: No events overnight.    Review of Systems:  Unable to obtain    Physical Exam:  Vital Signs Last 24 Hrs  T(C): 37.1 (01 Oct 2021 06:00), Max: 37.2 (30 Sep 2021 20:00)  T(F): 98.8 (01 Oct 2021 06:00), Max: 99 (30 Sep 2021 20:00)  HR: 82 (01 Oct 2021 08:00) (73 - 102)  BP: 123/51 (01 Oct 2021 08:00) (110/54 - 144/54)  BP(mean): 73 (01 Oct 2021 08:00) (66 - 121)  RR: 26 (01 Oct 2021 08:00) (16 - 38)  SpO2: 100% (01 Oct 2021 08:00) (96% - 100%)  General: Chronically ill appearing  HEENT: Trach  Neck: No JVD, no carotid bruit  Lungs: Coarse bilaterally  CV: RRR, nl S1/S2, no M/R/G  Abdomen: S/NT/ND, +BS  Extremities: No LE edema, no cyanosis  Neuro: AAOx0  Skin: No rash    Labs:    10-01    138  |  106  |  10  ----------------------------<  155<H>  4.0   |  26  |  1.02    Ca    8.7      01 Oct 2021 06:56    TPro  6.7  /  Alb  1.9<L>  /  TBili  0.4  /  DBili  x   /  AST  27  /  ALT  17  /  AlkPhos  107  09-30                        8.7    9.05  )-----------( 437      ( 01 Oct 2021 06:56 )             29.6       Telemetry: Sinus rhythm

## 2021-10-01 NOTE — PROGRESS NOTE ADULT - SUBJECTIVE AND OBJECTIVE BOX
Neurology follow up note    BREA [id number]      Interval History:    Patient resting in bed     Allergies    codeine (Hives)    Intolerances        MEDICATIONS    ALBUTerol    90 MICROgram(s) HFA Inhaler 1 Puff(s) Inhalation every 6 hours PRN  aspirin  chewable 81 milliGRAM(s) Oral daily  bisacodyl Suppository 10 milliGRAM(s) Rectal at bedtime  chlorhexidine 0.12% Liquid 15 milliLiter(s) Oral Mucosa every 12 hours  dextrose 40% Gel 15 Gram(s) Oral once  dextrose 5%. 1000 milliLiter(s) IV Continuous <Continuous>  dextrose 5%. 1000 milliLiter(s) IV Continuous <Continuous>  dextrose 50% Injectable 25 Gram(s) IV Push once  dextrose 50% Injectable 12.5 Gram(s) IV Push once  dextrose 50% Injectable 25 Gram(s) IV Push once  fentaNYL   Patch  12 MICROgram(s)/Hr 1 Patch Transdermal every 72 hours  glucagon  Injectable 1 milliGRAM(s) IntraMuscular once  heparin   Injectable 5000 Unit(s) SubCutaneous every 8 hours  insulin glargine Injectable (LANTUS) 36 Unit(s) SubCutaneous at bedtime  insulin lispro (ADMELOG) corrective regimen sliding scale   SubCutaneous every 6 hours  ipratropium 17 MICROgram(s) HFA Inhaler 1 Puff(s) Inhalation every 6 hours  lactobacillus acidophilus 1 Tablet(s) Oral daily  LORazepam     Tablet 1 milliGRAM(s) Oral every 6 hours  LORazepam   Injectable 1 milliGRAM(s) IV Push every 6 hours PRN  methocarbamol 250 milliGRAM(s) Oral two times a day  pantoprazole  Injectable 40 milliGRAM(s) IV Push daily  piperacillin/tazobactam IVPB.. 3.375 Gram(s) IV Intermittent every 8 hours  polyethylene glycol 3350 17 Gram(s) Oral every 12 hours PRN  povidone iodine 10% Solution 1 Application(s) Topical daily  senna 1 Tablet(s) Oral daily  simethicone 80 milliGRAM(s) Chew two times a day PRN  sucralfate suspension 1 Gram(s) Enteral Tube four times a day              Vital Signs Last 24 Hrs  T(C): 37.1 (01 Oct 2021 06:00), Max: 37.2 (30 Sep 2021 20:00)  T(F): 98.8 (01 Oct 2021 06:00), Max: 99 (30 Sep 2021 20:00)  HR: 82 (01 Oct 2021 08:00) (73 - 102)  BP: 123/51 (01 Oct 2021 08:00) (107/53 - 144/54)  BP(mean): 73 (01 Oct 2021 08:00) (66 - 121)  RR: 26 (01 Oct 2021 08:00) (14 - 38)  SpO2: 100% (01 Oct 2021 08:00) (93% - 100%)          REVIEW OF SYSTEMS:  Could not be obtained secondary to the patient being nonverbal.  As per my conversation with the spouse, a gradual process along with underlying strokes causing her to become bed-bound.    PHYSICAL EXAMINATION:    Head:  Normocephalic, atraumatic.  Eyes:  No scleral icterus.  Ears:  Cannot be evaluated secondary to the patient being nonverbal.  NECK:  Tracheostomy was in place.  RESPIRATORY:  Good air entry bilaterally.  CARDIOVASCULAR:  S1 and S2 heard.  ABDOMEN:  Soft and nontender.  EXTREMITIES:  No clubbing or cyanosis were noted.      NEUROLOGIC:  The patient was awake in bed.  At present no movement.  does have a history upon stimulating the patient, her eyes did roll up.  Her body started to stiffen with abnormal mouth movements, lasted one to two minutes history of this  Pupils bilaterally were 3 mm, reactive to 2 mm.  The patient has her arms in a flexed position with both hands contracted.  Bilateral lower extremities were in a straight position.  The patient has increased tone, bilateral upper and lower extremities.               LABS:  CBC Full  -  ( 01 Oct 2021 06:56 )  WBC Count : 9.05 K/uL  RBC Count : 3.19 M/uL  Hemoglobin : 8.7 g/dL  Hematocrit : 29.6 %  Platelet Count - Automated : 437 K/uL  Mean Cell Volume : 92.8 fl  Mean Cell Hemoglobin : 27.3 pg  Mean Cell Hemoglobin Concentration : 29.4 gm/dL  Auto Neutrophil # : 7.15 K/uL  Auto Lymphocyte # : 1.02 K/uL  Auto Monocyte # : 0.64 K/uL  Auto Eosinophil # : 0.12 K/uL  Auto Basophil # : 0.03 K/uL  Auto Neutrophil % : 79.0 %  Auto Lymphocyte % : 11.3 %  Auto Monocyte % : 7.1 %  Auto Eosinophil % : 1.3 %  Auto Basophil % : 0.3 %      10-01    138  |  106  |  10  ----------------------------<  155<H>  4.0   |  26  |  1.02    Ca    8.7      01 Oct 2021 06:56    TPro  6.7  /  Alb  1.9<L>  /  TBili  0.4  /  DBili  x   /  AST  27  /  ALT  17  /  AlkPhos  107  09-30    Hemoglobin A1C:     LIVER FUNCTIONS - ( 30 Sep 2021 08:59 )  Alb: 1.9 g/dL / Pro: 6.7 g/dL / ALK PHOS: 107 U/L / ALT: 17 U/L DA / AST: 27 U/L / GGT: x           Vitamin B12         RADIOLOGY      ANALYSIS AND PLAN:  This is a 75-year-old with an episode of cardiac arrest and abnormal movements.  For episodes of cardiac arrest, continue to monitor heart rate on telemetry.  If possible, please maintain a systolic blood pressure greater than 100, to help maintain cerebral perfusion.  For history of abnormal movements, as per my conversation with the spouse, this is a known condition that happens to her, as per him with GI-related issues.  She will start to hyperventilate, eyes will roll up, will have abnormal mouth movement, and tried to move from side-to-side.  She has undergone extensive EEG monitoring and has captured these events on numerous occasions and deemed to be non-epileptiform.  For now, I would recommend supportive therapy.  No antiepileptic medications.  For history of diabetes, strict control of blood sugars.  For history of cerebrovascular accident, continue the patient on her home medications.  smuscle relaxant     The spouse's name is Marciano.  His telephone number is [phone]     Greater than 40 minutes of time was spent with the patient, plan of care, reviewing data, speaking to the multidisciplinary healthcare team with greater than 50% of that time in counseling and care coordination.

## 2021-10-01 NOTE — PROGRESS NOTE ADULT - SUBJECTIVE AND OBJECTIVE BOX
BREA BECKHAM     [facility]    Allergies    codeine (Hives)    Intolerances        PAST MEDICAL & SURGICAL HISTORY:  Dementia of frontal lobe type    Aphasic stroke    Diabetes mellitus    Respiratory failure    Hypertension    GERD (gastroesophageal reflux disease)    Constipation    Respiratory failure    CVA (cerebral vascular accident)    HTN (hypertension)    DM (diabetes mellitus)    Advanced dementia    COVID-19 virus detected    Quadriplegia    Pneumonia    Type II diabetes mellitus    Hx of appendectomy    Gastrostomy in place    Tracheostomy in place    Tracheostomy tube present    Feeding by G-tube        FAMILY HISTORY:      Home Medications:  acetaminophen 160 mg/5 mL oral suspension: 20.31 milliliter(s) by gastrostomy tube every 6 hours, As Needed (23 Jun 2021 09:08)  albuterol 90 mcg/inh inhalation aerosol: 2 puff(s) inhaled every 6 hours (23 Jun 2021 09:08)  aspirin 81 mg oral tablet, chewable: 1 tab(s) by PEG tube once a day (15 Zay 2021 15:07)  Bacid (LAC) oral tablet: 2 tab(s) by gastrostomy tube once a day (23 Jun 2021 09:08)  bisacodyl 5 mg oral delayed release tablet: 1 tab(s) orally once a day (at bedtime) (23 Jun 2021 09:08)  Carafate 1 g/10 mL oral suspension: 10 milliliter(s) by gastrostomy tube 4 times a day (before meals and at bedtime) for 14 days (Started 6/4/21) (15 Zay 2021 15:07)  chlorhexidine 0.12% mucous membrane liquid: 15 milliliter(s) mucous membrane 2 times a day (15 Sep 2021 12:08)  insulin glargine: 36 unit(s) subcutaneous once a day (at bedtime) (15 Sep 2021 12:08)  ipratropium-albuterol 0.5 mg-2.5 mg/3 mL inhalation solution: 3 milliliter(s) inhaled 4 times a day (15 Zay 2021 15:07)  LORazepam 0.5 mg oral tablet: 1 tab(s) orally every 2 hours, As needed, Agitation (15 Sep 2021 12:08)  LORazepam 1 mg oral tablet: 1 tab(s) orally every 6 hours (15 Sep 2021 12:08)  methocarbamol: 250 milligram(s) by gastrostomy tube 2 times a day (15 Sep 2021 12:08)  pantoprazole 40 mg oral granule, delayed release: 40 milligram(s) by gastrostomy tube 2 times a day (15 Sep 2021 12:08)  polyethylene glycol 3350 oral powder for reconstitution: 17 gram(s) orally every 12 hours (23 Jun 2021 09:08)  senna 8.6 mg oral tablet: 1 tab(s) by gastrostomy tube once a day (at bedtime) (23 Jun 2021 09:08)  simethicone 80 mg oral tablet, chewable: 1 tab(s) orally every 6 hours (15 Sep 2021 12:08)      MEDICATIONS  (STANDING):  aspirin  chewable 81 milliGRAM(s) Oral daily  bisacodyl Suppository 10 milliGRAM(s) Rectal at bedtime  chlorhexidine 0.12% Liquid 15 milliLiter(s) Oral Mucosa every 12 hours  dextrose 40% Gel 15 Gram(s) Oral once  dextrose 5%. 1000 milliLiter(s) (50 mL/Hr) IV Continuous <Continuous>  dextrose 5%. 1000 milliLiter(s) (100 mL/Hr) IV Continuous <Continuous>  dextrose 50% Injectable 25 Gram(s) IV Push once  dextrose 50% Injectable 12.5 Gram(s) IV Push once  dextrose 50% Injectable 25 Gram(s) IV Push once  fentaNYL   Patch  12 MICROgram(s)/Hr 1 Patch Transdermal every 72 hours  glucagon  Injectable 1 milliGRAM(s) IntraMuscular once  heparin   Injectable 5000 Unit(s) SubCutaneous every 8 hours  insulin glargine Injectable (LANTUS) 36 Unit(s) SubCutaneous at bedtime  insulin lispro (ADMELOG) corrective regimen sliding scale   SubCutaneous every 6 hours  ipratropium 17 MICROgram(s) HFA Inhaler 1 Puff(s) Inhalation every 6 hours  lactobacillus acidophilus 1 Tablet(s) Oral daily  LORazepam     Tablet 1 milliGRAM(s) Oral every 6 hours  methocarbamol 250 milliGRAM(s) Oral two times a day  pantoprazole  Injectable 40 milliGRAM(s) IV Push daily  piperacillin/tazobactam IVPB.. 3.375 Gram(s) IV Intermittent every 8 hours  povidone iodine 10% Solution 1 Application(s) Topical daily  senna 1 Tablet(s) Oral daily  sucralfate suspension 1 Gram(s) Enteral Tube four times a day    MEDICATIONS  (PRN):  ALBUTerol    90 MICROgram(s) HFA Inhaler 1 Puff(s) Inhalation every 6 hours PRN Shortness of Breath and/or Wheezing  LORazepam   Injectable 1 milliGRAM(s) IV Push every 6 hours PRN agtation and vent dyssynchrony  polyethylene glycol 3350 17 Gram(s) Oral every 12 hours PRN Constipation  simethicone 80 milliGRAM(s) Chew two times a day PRN Dyspepsia      Diet, NPO with Tube Feed:   Tube Feeding Modality: Gastrostomy  Jevity 1.5 Horacio  Total Volume for 24 Hours (mL): 720  Continuous  Starting Tube Feed Rate mL per Hour: 30  Until Goal Tube Feed Rate (mL per Hour): 30  Tube Feed Duration (in Hours): 24  Tube Feed Start Time: 19:00 (09-26-21 @ 18:08) [Active]          Vital Signs Last 24 Hrs  T(C): 37.1 (01 Oct 2021 06:00), Max: 37.2 (30 Sep 2021 20:00)  T(F): 98.8 (01 Oct 2021 06:00), Max: 99 (30 Sep 2021 20:00)  HR: 82 (01 Oct 2021 08:00) (73 - 102)  BP: 123/51 (01 Oct 2021 08:00) (107/53 - 144/54)  BP(mean): 73 (01 Oct 2021 08:00) (66 - 121)  RR: 26 (01 Oct 2021 08:00) (14 - 38)  SpO2: 100% (01 Oct 2021 08:00) (93% - 100%)      09-30-21 @ 07:01  -  10-01-21 @ 07:00  --------------------------------------------------------  IN: 1875 mL / OUT: 600 mL / NET: 1275 mL        Mode: PRVC, RR (machine): 18, TV (machine): 400, FiO2: 30, PEEP: 5, ITime: 1, MAP: 14, PIP: 28      LABS:                        8.7    9.05  )-----------( 437      ( 01 Oct 2021 06:56 )             29.6     10-01    138  |  106  |  10  ----------------------------<  155<H>  4.0   |  26  |  1.02    Ca    8.7      01 Oct 2021 06:56    TPro  6.7  /  Alb  1.9<L>  /  TBili  0.4  /  DBili  x   /  AST  27  /  ALT  17  /  AlkPhos  107  09-30              WBC:  WBC Count: 9.05 K/uL (10-01 @ 06:56)  WBC Count: 10.07 K/uL (09-30 @ 08:59)  WBC Count: 7.44 K/uL (09-29 @ 07:08)  WBC Count: 7.82 K/uL (09-27 @ 12:08)      MICROBIOLOGY:  RECENT CULTURES:  09-26 .Sputum Sputum Pseudomonas aeruginosa (Carbapenem Resistant)  Serratia marcescens  Stenotrophomonas maltophilia   Few polymorphonuclear leukocytes per low power field  Rare Squamous epithelial cells per low power field  Few Gram Negative Rods per oil power field   Moderate Pseudomonas aeruginosa (Carbapenem Resistant)  Moderate Serratia marcescens  Moderate Stenotrophomonas maltophilia  Normal Respiratory Elvira absent    09-25 .Blood Blood-Peripheral Proteus mirabilis XXXX   No Growth Final                    Sodium:  Sodium, Serum: 138 mmol/L (10-01 @ 06:56)  Sodium, Serum: 138 mmol/L (09-30 @ 08:59)  Sodium, Serum: 141 mmol/L (09-29 @ 07:08)      1.02 mg/dL 10-01 @ 06:56  0.91 mg/dL 09-30 @ 08:59  0.92 mg/dL 09-29 @ 07:08      Hemoglobin:  Hemoglobin: 8.7 g/dL (10-01 @ 06:56)  Hemoglobin: 8.3 g/dL (09-30 @ 08:59)  Hemoglobin: 7.5 g/dL (09-29 @ 07:08)  Hemoglobin: 8.4 g/dL (09-27 @ 12:08)      Platelets: Platelet Count - Automated: 437 K/uL (10-01 @ 06:56)  Platelet Count - Automated: 472 K/uL (09-30 @ 08:59)  Platelet Count - Automated: 346 K/uL (09-29 @ 07:08)  Platelet Count - Automated: 286 K/uL (09-27 @ 12:08)      LIVER FUNCTIONS - ( 30 Sep 2021 08:59 )  Alb: 1.9 g/dL / Pro: 6.7 g/dL / ALK PHOS: 107 U/L / ALT: 17 U/L DA / AST: 27 U/L / GGT: x                 RADIOLOGY & ADDITIONAL STUDIES:      MICROBIOLOGY:  RECENT CULTURES:  09-26 .Sputum Sputum Pseudomonas aeruginosa (Carbapenem Resistant)  Serratia marcescens  Stenotrophomonas maltophilia   Few polymorphonuclear leukocytes per low power field  Rare Squamous epithelial cells per low power field  Few Gram Negative Rods per oil power field   Moderate Pseudomonas aeruginosa (Carbapenem Resistant)  Moderate Serratia marcescens  Moderate Stenotrophomonas maltophilia  Normal Respiratory Elvira absent    09-25 .Blood Blood-Peripheral Proteus mirabilis XXXX   No Growth Final

## 2021-10-01 NOTE — PROGRESS NOTE ADULT - SUBJECTIVE AND OBJECTIVE BOX
Patient is a 75y old  Female who presents with a chief complaint of Respiratory distress. (01 Oct 2021 09:29)      INTERVAL HPI/OVERNIGHT EVENTS:  Pt is seen and examined.  unable to obtain due to underlying medical condition.    Pain Location & Control:     MEDICATIONS  (STANDING):  aspirin  chewable 81 milliGRAM(s) Oral daily  bisacodyl Suppository 10 milliGRAM(s) Rectal at bedtime  chlorhexidine 0.12% Liquid 15 milliLiter(s) Oral Mucosa every 12 hours  dextrose 40% Gel 15 Gram(s) Oral once  dextrose 5%. 1000 milliLiter(s) (50 mL/Hr) IV Continuous <Continuous>  dextrose 5%. 1000 milliLiter(s) (100 mL/Hr) IV Continuous <Continuous>  dextrose 50% Injectable 25 Gram(s) IV Push once  dextrose 50% Injectable 12.5 Gram(s) IV Push once  dextrose 50% Injectable 25 Gram(s) IV Push once  fentaNYL   Patch  12 MICROgram(s)/Hr 1 Patch Transdermal every 72 hours  glucagon  Injectable 1 milliGRAM(s) IntraMuscular once  heparin   Injectable 5000 Unit(s) SubCutaneous every 8 hours  insulin glargine Injectable (LANTUS) 36 Unit(s) SubCutaneous at bedtime  insulin lispro (ADMELOG) corrective regimen sliding scale   SubCutaneous every 6 hours  ipratropium 17 MICROgram(s) HFA Inhaler 1 Puff(s) Inhalation every 6 hours  lactobacillus acidophilus 1 Tablet(s) Oral daily  LORazepam     Tablet 1 milliGRAM(s) Oral every 6 hours  methocarbamol 250 milliGRAM(s) Oral two times a day  pantoprazole  Injectable 40 milliGRAM(s) IV Push daily  piperacillin/tazobactam IVPB.. 3.375 Gram(s) IV Intermittent every 8 hours  povidone iodine 10% Solution 1 Application(s) Topical daily  senna 1 Tablet(s) Oral daily  sucralfate suspension 1 Gram(s) Enteral Tube four times a day    MEDICATIONS  (PRN):  ALBUTerol    90 MICROgram(s) HFA Inhaler 1 Puff(s) Inhalation every 6 hours PRN Shortness of Breath and/or Wheezing  LORazepam   Injectable 1 milliGRAM(s) IV Push every 6 hours PRN agtation and vent dyssynchrony  polyethylene glycol 3350 17 Gram(s) Oral every 12 hours PRN Constipation  simethicone 80 milliGRAM(s) Chew two times a day PRN Dyspepsia      Allergies    codeine (Hives)    Intolerances            Vital Signs Last 24 Hrs  T(C): 37.1 (01 Oct 2021 06:00), Max: 37.2 (30 Sep 2021 20:00)  T(F): 98.8 (01 Oct 2021 06:00), Max: 99 (30 Sep 2021 20:00)  HR: 82 (01 Oct 2021 08:00) (73 - 102)  BP: 123/51 (01 Oct 2021 08:00) (110/54 - 144/54)  BP(mean): 73 (01 Oct 2021 08:00) (66 - 121)  RR: 26 (01 Oct 2021 08:00) (16 - 38)  SpO2: 100% (01 Oct 2021 08:00) (96% - 100%)        LABS:                        8.7    9.05  )-----------( 437      ( 01 Oct 2021 06:56 )             29.6     01 Oct 2021 06:56    138    |  106    |  10     ----------------------------<  155    4.0     |  26     |  1.02     Ca    8.7        01 Oct 2021 06:56          CAPILLARY BLOOD GLUCOSE      POCT Blood Glucose.: 163 mg/dL (01 Oct 2021 05:30)  POCT Blood Glucose.: 175 mg/dL (30 Sep 2021 23:09)  POCT Blood Glucose.: 185 mg/dL (30 Sep 2021 17:02)  POCT Blood Glucose.: 138 mg/dL (30 Sep 2021 11:37)        Cultures  Culture Results:   Moderate Pseudomonas aeruginosa (Carbapenem Resistant)  Moderate Serratia marcescens  Moderate Stenotrophomonas maltophilia  Normal Respiratory Elvira absent (09-26 @ 12:50)  Culture Results:   No Growth Final (09-25 @ 21:55)  Culture Results:   No Growth Final (09-25 @ 21:55)  Culture Results:   >100,000 CFU/ml Proteus mirabilis (09-25 @ 21:55)        Culture - Sputum (collected 09-26-21 @ 12:50)  Source: .Sputum Sputum  Gram Stain (09-26-21 @ 15:43):    Few polymorphonuclear leukocytes per low power field    Rare Squamous epithelial cells per low power field    Few Gram Negative Rods per oil power field  Final Report (09-28-21 @ 14:54):    Moderate Pseudomonas aeruginosa (Carbapenem Resistant)    Moderate Serratia marcescens    Moderate Stenotrophomonas maltophilia    Normal Respiratory Elvira absent  Organism: Pseudomonas aeruginosa (Carbapenem Resistant)  Serratia marcescens  Stenotrophomonas maltophilia (09-28-21 @ 14:54)  Organism: Stenotrophomonas maltophilia (09-28-21 @ 14:54)      -  Ceftazidime: R >16      -  Levofloxacin: S 1      -  Trimethoprim/Sulfamethoxazole: S <=0.5/9.5      Method Type: PRATIK  Organism: Serratia marcescens (09-28-21 @ 14:54)      -  Amikacin: S <=16      -  Amoxicillin/Clavulanic Acid: R >16/8      -  Ampicillin: R >16 These ampicillin results predict results for amoxicillin      -  Ampicillin/Sulbactam: R >16/8 Enterobacter, Citrobacter, and Serratia may develop resistance during prolonged therapy (3-4 days)      -  Aztreonam: S <=4      -  Cefazolin: R >16 Enterobacter, Citrobacter, and Serratia may develop resistance during prolonged therapy (3-4 days)      -  Cefepime: S <=2      -  Cefoxitin: R <=8      -  Ceftriaxone: I 2 Enterobacter, Citrobacter, and Serratia may develop resistance during prolonged therapy      -  Ciprofloxacin: R >2      -  Ertapenem: S <=0.5      -  Gentamicin: S <=2      -  Levofloxacin: R 2      -  Meropenem: S <=1      -  Piperacillin/Tazobactam: S <=8      -  Tobramycin: S <=2      -  Trimethoprim/Sulfamethoxazole: S <=0.5/9.5      Method Type: PRATIK  Organism: Pseudomonas aeruginosa (Carbapenem Resistant) (09-28-21 @ 14:54)      -  Amikacin: S <=16      -  Aztreonam: S <=4      -  Cefepime: S 4      -  Ceftazidime: S 8      -  Ciprofloxacin: S <=0.25      -  Gentamicin: S <=2      -  Imipenem: R >8      -  Levofloxacin: S <=0.5      -  Meropenem: I 4      -  Piperacillin/Tazobactam: S <=8      -  Tobramycin: S <=2      Method Type: PRATIK    Culture - Urine (collected 09-25-21 @ 21:55)  Source: Clean Catch Clean Catch (Midstream)  Final Report (09-29-21 @ 11:48):    >100,000 CFU/ml Proteus mirabilis  Organism: Proteus mirabilis (09-29-21 @ 11:48)  Organism: Proteus mirabilis (09-29-21 @ 11:48)      -  Amikacin: S <=16      -  Amoxicillin/Clavulanic Acid: S <=8/4      -  Ampicillin: S <=8 These ampicillin results predict results for amoxicillin      -  Ampicillin/Sulbactam: S <=4/2 Enterobacter, Citrobacter, and Serratia may develop resistance during prolonged therapy (3-4 days)      -  Aztreonam: S <=4      -  Cefazolin: S 4 ([id number]) For uncomplicated UTI with K. pneumoniae, E. coli, or P. mirablis: PRATIK <=16 is sensitive and PRATIK >=32 is resistant. This also predicts results for oral agents cefaclor, cefdinir, cefpodoxime, cefprozil, cefuroxime axetil, cephalexin and locarbef for uncomplicated UTI. Note that some isolates may be susceptible to these agents while testing resistant to cefazolin.      -  Cefepime: S <=2      -  Cefoxitin: S <=8      -  Ceftriaxone: S <=1 Enterobacter, Citrobacter, and Serratia may develop resistance during prolonged therapy      -  Ciprofloxacin: R >2      -  Ertapenem: S <=0.5      -  Gentamicin: S <=2      -  Levofloxacin: R 2      -  Meropenem: S <=1      -  Nitrofurantoin: R >64 Should not be used to treat pyelonephritis      -  Piperacillin/Tazobactam: S <=8      -  Tobramycin: S 4      -  Trimethoprim/Sulfamethoxazole: R >2/38      Method Type: PRATIK    Culture - Blood (collected 09-25-21 @ 21:55)  Source: .Blood Blood-Peripheral  Final Report (09-30-21 @ 22:00):    No Growth Final    Culture - Blood (collected 09-25-21 @ 21:55)  Source: .Blood Blood-Peripheral  Final Report (09-30-21 @ 22:00):    No Growth Final        RADIOLOGY & ADDITIONAL TESTS:    Imaging Personally Reviewed:  [ ] YES  [ ] NO    Consultant(s) Notes Reviewed:  [ ] YES  [ ] NO    Care Discussed with Consultants/Other Providers [ x] YES  [ ] NO

## 2021-10-01 NOTE — PROGRESS NOTE ADULT - ASSESSMENT
Patient is a 75 year old female with PMH of HTN, DM type 2, CVA, recent Cardiac Arrest, Chronic Respiratory Failure, Vent Dependant s/p trach & PEG, Anemia with GI blood loss, and Dementia who was sent from [facility] facility for acute respiratory distress  Sepsis due to recurrent ventilator associated pneumonia vs aspiration pneumonia, possible UTI    - fever 100.6F yesterday, now afebrile. Leukocytosis normalized. Sepsis resolved.    - trach to vent, FiO2 50%.     - CT imaging reviewed, new dense b/l lung consolidations, mediastinal lad, small b/l effusions and atelectasis,  recent adm with PNA with Serratia marcescens and Pseudomonas aeruginosa in sputum    - RVP/COVID and legionella urine ag negative    - UA with pyuria, has vaughn in place with yellow urine; prior cx reviewed colonized with Proteus    Recommendations:   PNA-  based on prior micro and clinical response so far    - s/p vancomycin and pip-tazo,    - 9/28 noted Serratia marcescens and Pseudomonas aeruginosa and now stenotrophomonas in sputum    will follow for any need to add any coverage for steno but pt appears to be improving on current Rx so no change in current plan  Acute on chronic respiratory distress due to above  RYAN, nephrology following, pt seems much improved from admission, currently afebrile with decrease in pulm secretions, minimal vent support and discussed with nursing - pt stable and improved  Continue on pip-tazo (started late 9/25) so as discussed with critical physician will continue abx for now until Monday 7-days with pathogens in sputum and then can look at dc off abx   -pt remains in [facility] on ventilatory support  -spoke to  Colin Daily at [phone]     We will follow along in the care of this patient. Please call us at [phone] or text me directly on my cell# at [phone] with any concerns.    Over the weekend Dr Chairez will be covering this patient so please contact him with any questions of concerns.

## 2021-10-01 NOTE — PROGRESS NOTE ADULT - ASSESSMENT
PARASHARAMI BREA 75 f [facility] S 9/24/2021   DR ILIANA KEMP     REVIEW OF SYMPTOMS      Able to give (reliable) ROS  NO     PHYSICAL EXAM    HEENT Unremarkable  atraumatic   RESP Fair air entry EXP prolonged    Harsh breath sound Resp distres mild   CARDIAC S1 S2 No S3     NO JVD    ABDOMEN SOFT BS PRESENT NOT DISTENDED No hepatosplenomegaly   PEDAL EDEMA present No calf tenderness  NO rash       PROBLEMS.        VAP poa 9/25  UTI poa 9/25  VDRF ([facility]h)  PEG ([facility])  ANOXIC ENCEPH ([facility])     WORSENING CXR 9/27/2021   ANEMIA 9/29/2021 Hb 7.5      EVENTS.    10/1/2021 w 9 Hb 8.7 Cr 1   9/30/2021 w 10 Hb 8.3 Na 138 Cr .9   9/30/2021 cxr sl clearing of lungs   9/29/2021 Hb 7.5     BEST PRACTICE ISSUES.                                                  HEAD OF BED ELEVATION. Yes  DVT PROPHYLAXIS.   hpsc 9/26/2021                                       SQUIRES PROPHYLAXIS.  carafate (9/25)                                                                                         DIET.            jevity 1.5 30/h 9/26/2021 gt               INFECTION PROPHYLAXIS.   chlorhexidine .12% 9/26/2021                      GENERAL ISSUES  GOC ADVANCED DIRECTIVE.                                         ALLERGY.  codeine                            WEIGHT.      9/25/2021 61                              BMI.                 9/25/2021 22       PATIENT DATA   TUBES  PROCEDURES.     poa Trach  poa peg    ABG.   9/26 7p 40%/vent 743/39/76    OXYGENATION.                   VITALS/IO/VENT/DRIPS.    10/1/2021 85 130/60   10/1/2021 ac 18/400/5/.3       PROBLEM ASSESSMENT/RECOMMENDATIONS.  COVID STATUS.  scv2 9/25/2021 (-)  spk ab 9/26/2021 (+) 250   RESP.  VDRF.   Lung protective ventilation  target po 90-95%  clinically stable     INFECTION.  VAP.   UTI.  W 9/25-9/26-9/27/2021 w 13-9.6-7.8   pr 9/26/2021 pr 2.1   ua 9/25/2021 w 26-50 l estrase mod   9/27/2021 cxr bl perihilar and lower lobe consolidations progressive   eventration r diaphr   ct cap 9/25/2021 dense bl cpnsolidations new mediastinal lne Small bl effs dilated fluid filled rectum   mrsa 9/27 (-)   9/26 sp   1) stenotrophomonas   2) carbapenem resistant pseucomonas   3) serratia  urine c 9/25 uc 100k proteus   zosyn 9/25 Abio course to end 9/30   COPD.  prov p (9/25)  atrov (9/25)  under control   ANEMIA.  Hb 9/25-9/26-9/27-9/29/2021 hb 10 - 9.1-8.4-7.5      ASSESSMENT RECOMMENDATIONS .    VDRF anoxic encephalopathy patient  admitted 9/25/2021 with VAP UTI resp distress started on bsab      TIME SPENT   Over 36 minutes aggregate critical care time spent on encounter; activities included   direct patient care, counseling and/or coordinating care reviewing notes, lab data/ imaging , discussion with multidisciplinary team/ patient  /family and explaining in detail risks, benefits, alternatives  of the recommendations     CHAPINCITO GUIDRY 75 f [facility] S 9/24/2021   DR ILIANA KEMP

## 2021-10-01 NOTE — PROGRESS NOTE ADULT - SUBJECTIVE AND OBJECTIVE BOX
Date/Time Patient Seen:  		  Referring MD:   Data Reviewed	       Patient is a 75y old  Female who presents with a chief complaint of Respiratory distress. (30 Sep 2021 14:10)      Subjective/HPI     PAST MEDICAL & SURGICAL HISTORY:  Dementia of frontal lobe type    Aphasic stroke    Diabetes mellitus    Respiratory failure    Hypertension    GERD (gastroesophageal reflux disease)    Constipation    Respiratory failure    CVA (cerebral vascular accident)    HTN (hypertension)    DM (diabetes mellitus)    Advanced dementia    COVID-19 virus detected    Quadriplegia    Pneumonia    Type II diabetes mellitus    Hx of appendectomy    Gastrostomy in place    Tracheostomy in place    Tracheostomy tube present    Feeding by G-tube          Medication list         MEDICATIONS  (STANDING):  aspirin  chewable 81 milliGRAM(s) Oral daily  bisacodyl Suppository 10 milliGRAM(s) Rectal at bedtime  chlorhexidine 0.12% Liquid 15 milliLiter(s) Oral Mucosa every 12 hours  dextrose 40% Gel 15 Gram(s) Oral once  dextrose 5%. 1000 milliLiter(s) (50 mL/Hr) IV Continuous <Continuous>  dextrose 5%. 1000 milliLiter(s) (100 mL/Hr) IV Continuous <Continuous>  dextrose 50% Injectable 25 Gram(s) IV Push once  dextrose 50% Injectable 12.5 Gram(s) IV Push once  dextrose 50% Injectable 25 Gram(s) IV Push once  fentaNYL   Patch  12 MICROgram(s)/Hr 1 Patch Transdermal every 72 hours  glucagon  Injectable 1 milliGRAM(s) IntraMuscular once  heparin   Injectable 5000 Unit(s) SubCutaneous every 8 hours  insulin glargine Injectable (LANTUS) 36 Unit(s) SubCutaneous at bedtime  insulin lispro (ADMELOG) corrective regimen sliding scale   SubCutaneous every 6 hours  ipratropium 17 MICROgram(s) HFA Inhaler 1 Puff(s) Inhalation every 6 hours  lactobacillus acidophilus 1 Tablet(s) Oral daily  LORazepam     Tablet 1 milliGRAM(s) Oral every 6 hours  methocarbamol 250 milliGRAM(s) Oral two times a day  pantoprazole  Injectable 40 milliGRAM(s) IV Push daily  piperacillin/tazobactam IVPB.. 3.375 Gram(s) IV Intermittent every 8 hours  povidone iodine 10% Solution 1 Application(s) Topical daily  senna 1 Tablet(s) Oral daily  sucralfate suspension 1 Gram(s) Enteral Tube four times a day    MEDICATIONS  (PRN):  ALBUTerol    90 MICROgram(s) HFA Inhaler 1 Puff(s) Inhalation every 6 hours PRN Shortness of Breath and/or Wheezing  LORazepam   Injectable 1 milliGRAM(s) IV Push every 6 hours PRN agtation and vent dyssynchrony  polyethylene glycol 3350 17 Gram(s) Oral every 12 hours PRN Constipation  simethicone 80 milliGRAM(s) Chew two times a day PRN Dyspepsia         Vitals log        ICU Vital Signs Last 24 Hrs  T(C): 37.1 (01 Oct 2021 06:00), Max: 37.2 (30 Sep 2021 20:00)  T(F): 98.8 (01 Oct 2021 06:00), Max: 99 (30 Sep 2021 20:00)  HR: 82 (01 Oct 2021 06:18) (73 - 102)  BP: 125/65 (01 Oct 2021 06:00) (107/53 - 144/54)  BP(mean): 83 (01 Oct 2021 06:00) (66 - 121)  ABP: --  ABP(mean): --  RR: 28 (01 Oct 2021 06:00) (14 - 38)  SpO2: 98% (01 Oct 2021 06:18) (93% - 100%)       Mode: PRVC  RR (machine): 18  TV (machine): 400  FiO2: 30  PEEP: 5  ITime: 1  MAP: 16  PIP: 35      Input and Output:  I&O's Detail    29 Sep 2021 07:01  -  30 Sep 2021 07:00  --------------------------------------------------------  IN:    Free Water: 800 mL    IV PiggyBack: 200 mL    Jevity 1.5: 690 mL  Total IN: 1690 mL    OUT:    Dexmedetomidine: 0 mL    Voided (mL): 650 mL  Total OUT: 650 mL    Total NET: 1040 mL      30 Sep 2021 07:01  -  01 Oct 2021 06:46  --------------------------------------------------------  IN:    Enteral Tube Flush: 375 mL    Free Water: 600 mL    IV PiggyBack: 300 mL    Jevity 1.5: 600 mL  Total IN: 1875 mL    OUT:    Voided (mL): 600 mL  Total OUT: 600 mL    Total NET: 1275 mL          Lab Data                        8.3    10.07 )-----------( 472      ( 30 Sep 2021 08:59 )             27.6     09-30    138  |  105  |  12  ----------------------------<  72  4.1   |  22  |  0.91    Ca    8.6      30 Sep 2021 08:59    TPro  6.7  /  Alb  1.9<L>  /  TBili  0.4  /  DBili  x   /  AST  27  /  ALT  17  /  AlkPhos  107  09-30            Review of Systems	      Objective     Physical Examination    heart s1s2  lung dec BS      Pertinent Lab findings & Imaging      Annalise:  NO   Adequate UO     I&O's Detail    29 Sep 2021 07:01  -  30 Sep 2021 07:00  --------------------------------------------------------  IN:    Free Water: 800 mL    IV PiggyBack: 200 mL    Jevity 1.5: 690 mL  Total IN: 1690 mL    OUT:    Dexmedetomidine: 0 mL    Voided (mL): 650 mL  Total OUT: 650 mL    Total NET: 1040 mL      30 Sep 2021 07:01  -  01 Oct 2021 06:46  --------------------------------------------------------  IN:    Enteral Tube Flush: 375 mL    Free Water: 600 mL    IV PiggyBack: 300 mL    Jevity 1.5: 600 mL  Total IN: 1875 mL    OUT:    Voided (mL): 600 mL  Total OUT: 600 mL    Total NET: 1275 mL               Discussed with:     Cultures:	        Radiology

## 2021-10-01 NOTE — PROGRESS NOTE ADULT - ASSESSMENT
PLAN:    -Patient currently on Full vent support  -titrate settings to maintain SaO2 >90%, or pH >7.25  -consider low tidal volume ventilation strategy w/ goal Tv 4-6 cc/kg of ideal body weight  -plateu pressure goal <30  -Peridex oral care  -aggressive pulmonary toilet  -maitnain bowel regimen  -will need to finish course of anbx as per ID for noted CRE in sputum  -Am labs

## 2021-10-02 PROCEDURE — 99233 SBSQ HOSP IP/OBS HIGH 50: CPT

## 2021-10-02 RX ADMIN — Medication 10 MILLIGRAM(S): at 22:17

## 2021-10-02 RX ADMIN — INSULIN GLARGINE 36 UNIT(S): 100 INJECTION, SOLUTION SUBCUTANEOUS at 22:18

## 2021-10-02 RX ADMIN — CHLORHEXIDINE GLUCONATE 15 MILLILITER(S): 213 SOLUTION TOPICAL at 06:10

## 2021-10-02 RX ADMIN — Medication 1 MILLIGRAM(S): at 11:17

## 2021-10-02 RX ADMIN — Medication 1 MILLIGRAM(S): at 18:11

## 2021-10-02 RX ADMIN — ALBUTEROL 1 PUFF(S): 90 AEROSOL, METERED ORAL at 01:06

## 2021-10-02 RX ADMIN — PIPERACILLIN AND TAZOBACTAM 25 GRAM(S): 4; .5 INJECTION, POWDER, LYOPHILIZED, FOR SOLUTION INTRAVENOUS at 05:49

## 2021-10-02 RX ADMIN — CHLORHEXIDINE GLUCONATE 15 MILLILITER(S): 213 SOLUTION TOPICAL at 18:11

## 2021-10-02 RX ADMIN — METHOCARBAMOL 250 MILLIGRAM(S): 500 TABLET, FILM COATED ORAL at 18:11

## 2021-10-02 RX ADMIN — Medication 1 APPLICATION(S): at 11:17

## 2021-10-02 RX ADMIN — FENTANYL CITRATE 1 PATCH: 50 INJECTION INTRAVENOUS at 20:56

## 2021-10-02 RX ADMIN — Medication 1 MILLIGRAM(S): at 03:27

## 2021-10-02 RX ADMIN — PIPERACILLIN AND TAZOBACTAM 25 GRAM(S): 4; .5 INJECTION, POWDER, LYOPHILIZED, FOR SOLUTION INTRAVENOUS at 14:34

## 2021-10-02 RX ADMIN — Medication 1 TABLET(S): at 11:18

## 2021-10-02 RX ADMIN — Medication 1 MILLIGRAM(S): at 05:49

## 2021-10-02 RX ADMIN — ALBUTEROL 1 PUFF(S): 90 AEROSOL, METERED ORAL at 07:11

## 2021-10-02 RX ADMIN — HEPARIN SODIUM 5000 UNIT(S): 5000 INJECTION INTRAVENOUS; SUBCUTANEOUS at 05:49

## 2021-10-02 RX ADMIN — ALBUTEROL 1 PUFF(S): 90 AEROSOL, METERED ORAL at 20:30

## 2021-10-02 RX ADMIN — HEPARIN SODIUM 5000 UNIT(S): 5000 INJECTION INTRAVENOUS; SUBCUTANEOUS at 22:17

## 2021-10-02 RX ADMIN — Medication 1 GRAM(S): at 11:18

## 2021-10-02 RX ADMIN — FENTANYL CITRATE 1 PATCH: 50 INJECTION INTRAVENOUS at 08:18

## 2021-10-02 RX ADMIN — Medication 1 PUFF(S): at 14:20

## 2021-10-02 RX ADMIN — HEPARIN SODIUM 5000 UNIT(S): 5000 INJECTION INTRAVENOUS; SUBCUTANEOUS at 14:34

## 2021-10-02 RX ADMIN — Medication 81 MILLIGRAM(S): at 11:18

## 2021-10-02 RX ADMIN — Medication 1 PUFF(S): at 20:30

## 2021-10-02 RX ADMIN — Medication 1 PUFF(S): at 07:12

## 2021-10-02 RX ADMIN — PANTOPRAZOLE SODIUM 40 MILLIGRAM(S): 20 TABLET, DELAYED RELEASE ORAL at 11:18

## 2021-10-02 RX ADMIN — Medication 1 GRAM(S): at 18:11

## 2021-10-02 RX ADMIN — ALBUTEROL 1 PUFF(S): 90 AEROSOL, METERED ORAL at 14:21

## 2021-10-02 RX ADMIN — METHOCARBAMOL 250 MILLIGRAM(S): 500 TABLET, FILM COATED ORAL at 05:49

## 2021-10-02 RX ADMIN — Medication 1 PUFF(S): at 01:06

## 2021-10-02 RX ADMIN — SENNA PLUS 1 TABLET(S): 8.6 TABLET ORAL at 11:18

## 2021-10-02 RX ADMIN — Medication 1 GRAM(S): at 05:49

## 2021-10-02 NOTE — PROGRESS NOTE ADULT - SUBJECTIVE AND OBJECTIVE BOX
Neurology follow up note    BREA [id number]      Interval History:      Patient resting in bed     Allergies    codeine (Hives)    Intolerances        MEDICATIONS    ALBUTerol    90 MICROgram(s) HFA Inhaler 1 Puff(s) Inhalation every 6 hours PRN  aspirin  chewable 81 milliGRAM(s) Oral daily  bisacodyl Suppository 10 milliGRAM(s) Rectal at bedtime  chlorhexidine 0.12% Liquid 15 milliLiter(s) Oral Mucosa every 12 hours  dextrose 40% Gel 15 Gram(s) Oral once  dextrose 5%. 1000 milliLiter(s) IV Continuous <Continuous>  dextrose 5%. 1000 milliLiter(s) IV Continuous <Continuous>  dextrose 50% Injectable 25 Gram(s) IV Push once  dextrose 50% Injectable 12.5 Gram(s) IV Push once  dextrose 50% Injectable 25 Gram(s) IV Push once  glucagon  Injectable 1 milliGRAM(s) IntraMuscular once  heparin   Injectable 5000 Unit(s) SubCutaneous every 8 hours  insulin glargine Injectable (LANTUS) 36 Unit(s) SubCutaneous at bedtime  insulin lispro (ADMELOG) corrective regimen sliding scale   SubCutaneous every 6 hours  ipratropium 17 MICROgram(s) HFA Inhaler 1 Puff(s) Inhalation every 6 hours  lactobacillus acidophilus 1 Tablet(s) Oral daily  LORazepam     Tablet 1 milliGRAM(s) Oral every 6 hours  LORazepam   Injectable 1 milliGRAM(s) IV Push every 6 hours PRN  methocarbamol 250 milliGRAM(s) Oral two times a day  pantoprazole  Injectable 40 milliGRAM(s) IV Push daily  piperacillin/tazobactam IVPB.. 3.375 Gram(s) IV Intermittent every 8 hours  polyethylene glycol 3350 17 Gram(s) Oral every 12 hours PRN  povidone iodine 10% Solution 1 Application(s) Topical daily  senna 1 Tablet(s) Oral daily  simethicone 80 milliGRAM(s) Chew two times a day PRN  sucralfate suspension 1 Gram(s) Enteral Tube four times a day              Vital Signs Last 24 Hrs  T(C): 36.3 (02 Oct 2021 04:00), Max: 36.9 (01 Oct 2021 16:17)  T(F): 97.4 (02 Oct 2021 04:00), Max: 98.5 (01 Oct 2021 16:17)  HR: 69 (02 Oct 2021 10:46) (62 - 85)  BP: 106/45 (02 Oct 2021 10:00) (93/47 - 139/60)  BP(mean): 64 (02 Oct 2021 10:00) (61 - 85)  RR: 24 (02 Oct 2021 10:00) (18 - 33)  SpO2: 100% (02 Oct 2021 10:46) (95% - 100%)                  LABS:  CBC Full  -  ( 01 Oct 2021 06:56 )  WBC Count : 9.05 K/uL  RBC Count : 3.19 M/uL  Hemoglobin : 8.7 g/dL  Hematocrit : 29.6 %  Platelet Count - Automated : 437 K/uL  Mean Cell Volume : 92.8 fl  Mean Cell Hemoglobin : 27.3 pg  Mean Cell Hemoglobin Concentration : 29.4 gm/dL    REVIEW OF SYSTEMS:  Could not be obtained secondary to the patient being nonverbal.  As per my conversation with the spouse, a gradual process along with underlying strokes causing her to become bed-bound.    PHYSICAL EXAMINATION:    Head:  Normocephalic, atraumatic.  Eyes:  No scleral icterus.  Ears:  Cannot be evaluated secondary to the patient being nonverbal.  NECK:  Tracheostomy was in place.  RESPIRATORY:  Good air entry bilaterally.  CARDIOVASCULAR:  S1 and S2 heard.  ABDOMEN:  Soft and nontender.  EXTREMITIES:  No clubbing or cyanosis were noted.      NEUROLOGIC:  The patient was awake in bed.  Upon stimulating the patient, her eyes did roll up.  Her body started to stiffen with abnormal mouth movements, lasted one to two minutes.  Pupils bilaterally were 3 mm, reactive to 2 mm.  The patient has her arms in a flexed position with both hands contracted.  Bilateral lower extremities were in a straight position.  The patient has increased tone, bilateral upper and lower extremities.              Auto Neutrophil # : 7.15 K/uL  Auto Lymphocyte # : 1.02 K/uL  Auto Monocyte # : 0.64 K/uL  Auto Eosinophil # : 0.12 K/uL  Auto Basophil # : 0.03 K/uL  Auto Neutrophil % : 79.0 %  Auto Lymphocyte % : 11.3 %  Auto Monocyte % : 7.1 %  Auto Eosinophil % : 1.3 %  Auto Basophil % : 0.3 %      10-01    138  |  106  |  10  ----------------------------<  155<H>  4.0   |  26  |  1.02    Ca    8.7      01 Oct 2021 06:56      Hemoglobin A1C:       Vitamin B12         RADIOLOGY  ANALYSIS AND PLAN:  This is a 75-year-old with an episode of cardiac arrest and abnormal movements.  For episodes of cardiac arrest, continue to monitor heart rate on telemetry.  If possible, please maintain a systolic blood pressure greater than 100, to help maintain cerebral perfusion.  For history of abnormal movements, as per my conversation with the spouse, this is a known condition that happens to her, as per him with GI-related issues.  She will start to hyperventilate, eyes will roll up, will have abnormal mouth movement, and tried to move from side-to-side.  She has undergone extensive EEG monitoring and has captured these events on numerous occasions and deemed to be non-epileptiform.  For now, I would recommend supportive therapy.  No antiepileptic medications.  For history of diabetes, strict control of blood sugars.  For history of cerebrovascular accident, continue the patient on her home medications.  spoke to spouse agreeable to try muscle relaxant     The spouse's name is Marciano.  His telephone number is [phone] 9/11.  I had a lengthy discussion, we will not be starting any antiepileptic medications.    Greater than 40 minutes of time was spent with the patient, plan of care, reviewing data, speaking to the multidisciplinary healthcare team with greater than 50% of that time in counseling and care coordination.

## 2021-10-02 NOTE — PROGRESS NOTE ADULT - SUBJECTIVE AND OBJECTIVE BOX
BREA BECKHAM     [facility]    Allergies    codeine (Hives)    Intolerances        PAST MEDICAL & SURGICAL HISTORY:  Dementia of frontal lobe type    Aphasic stroke    Diabetes mellitus    Respiratory failure    Hypertension    GERD (gastroesophageal reflux disease)    Constipation    Respiratory failure    CVA (cerebral vascular accident)    HTN (hypertension)    DM (diabetes mellitus)    Advanced dementia    COVID-19 virus detected    Quadriplegia    Pneumonia    Type II diabetes mellitus    Hx of appendectomy    Gastrostomy in place    Tracheostomy in place    Tracheostomy tube present    Feeding by G-tube        FAMILY HISTORY:      Home Medications:  acetaminophen 160 mg/5 mL oral suspension: 20.31 milliliter(s) by gastrostomy tube every 6 hours, As Needed (23 Jun 2021 09:08)  albuterol 90 mcg/inh inhalation aerosol: 2 puff(s) inhaled every 6 hours (23 Jun 2021 09:08)  aspirin 81 mg oral tablet, chewable: 1 tab(s) by PEG tube once a day (15 Zay 2021 15:07)  Bacid (LAC) oral tablet: 2 tab(s) by gastrostomy tube once a day (23 Jun 2021 09:08)  bisacodyl 5 mg oral delayed release tablet: 1 tab(s) orally once a day (at bedtime) (23 Jun 2021 09:08)  Carafate 1 g/10 mL oral suspension: 10 milliliter(s) by gastrostomy tube 4 times a day (before meals and at bedtime) for 14 days (Started 6/4/21) (15 Zay 2021 15:07)  chlorhexidine 0.12% mucous membrane liquid: 15 milliliter(s) mucous membrane 2 times a day (15 Sep 2021 12:08)  insulin glargine: 36 unit(s) subcutaneous once a day (at bedtime) (15 Sep 2021 12:08)  ipratropium-albuterol 0.5 mg-2.5 mg/3 mL inhalation solution: 3 milliliter(s) inhaled 4 times a day (15 Zay 2021 15:07)  LORazepam 0.5 mg oral tablet: 1 tab(s) orally every 2 hours, As needed, Agitation (15 Sep 2021 12:08)  LORazepam 1 mg oral tablet: 1 tab(s) orally every 6 hours (15 Sep 2021 12:08)  methocarbamol: 250 milligram(s) by gastrostomy tube 2 times a day (15 Sep 2021 12:08)  pantoprazole 40 mg oral granule, delayed release: 40 milligram(s) by gastrostomy tube 2 times a day (15 Sep 2021 12:08)  polyethylene glycol 3350 oral powder for reconstitution: 17 gram(s) orally every 12 hours (23 Jun 2021 09:08)  senna 8.6 mg oral tablet: 1 tab(s) by gastrostomy tube once a day (at bedtime) (23 Jun 2021 09:08)  simethicone 80 mg oral tablet, chewable: 1 tab(s) orally every 6 hours (15 Sep 2021 12:08)      MEDICATIONS  (STANDING):  aspirin  chewable 81 milliGRAM(s) Oral daily  bisacodyl Suppository 10 milliGRAM(s) Rectal at bedtime  chlorhexidine 0.12% Liquid 15 milliLiter(s) Oral Mucosa every 12 hours  dextrose 40% Gel 15 Gram(s) Oral once  dextrose 5%. 1000 milliLiter(s) (50 mL/Hr) IV Continuous <Continuous>  dextrose 5%. 1000 milliLiter(s) (100 mL/Hr) IV Continuous <Continuous>  dextrose 50% Injectable 25 Gram(s) IV Push once  dextrose 50% Injectable 12.5 Gram(s) IV Push once  dextrose 50% Injectable 25 Gram(s) IV Push once  glucagon  Injectable 1 milliGRAM(s) IntraMuscular once  heparin   Injectable 5000 Unit(s) SubCutaneous every 8 hours  insulin glargine Injectable (LANTUS) 36 Unit(s) SubCutaneous at bedtime  insulin lispro (ADMELOG) corrective regimen sliding scale   SubCutaneous every 6 hours  ipratropium 17 MICROgram(s) HFA Inhaler 1 Puff(s) Inhalation every 6 hours  lactobacillus acidophilus 1 Tablet(s) Oral daily  LORazepam     Tablet 1 milliGRAM(s) Oral every 6 hours  methocarbamol 250 milliGRAM(s) Oral two times a day  pantoprazole  Injectable 40 milliGRAM(s) IV Push daily  piperacillin/tazobactam IVPB.. 3.375 Gram(s) IV Intermittent every 8 hours  povidone iodine 10% Solution 1 Application(s) Topical daily  senna 1 Tablet(s) Oral daily  sucralfate suspension 1 Gram(s) Enteral Tube four times a day    MEDICATIONS  (PRN):  ALBUTerol    90 MICROgram(s) HFA Inhaler 1 Puff(s) Inhalation every 6 hours PRN Shortness of Breath and/or Wheezing  LORazepam   Injectable 1 milliGRAM(s) IV Push every 6 hours PRN agtation and vent dyssynchrony  polyethylene glycol 3350 17 Gram(s) Oral every 12 hours PRN Constipation  simethicone 80 milliGRAM(s) Chew two times a day PRN Dyspepsia      Diet, NPO with Tube Feed:   Tube Feeding Modality: Gastrostomy  Jevity 1.5 Horacio  Total Volume for 24 Hours (mL): 720  Continuous  Starting Tube Feed Rate mL per Hour: 30  Until Goal Tube Feed Rate (mL per Hour): 30  Tube Feed Duration (in Hours): 24  Tube Feed Start Time: 19:00 (09-26-21 @ 18:08) [Active]          Vital Signs Last 24 Hrs  T(C): 36.3 (02 Oct 2021 04:00), Max: 37 (01 Oct 2021 12:30)  T(F): 97.4 (02 Oct 2021 04:00), Max: 98.6 (01 Oct 2021 12:30)  HR: 69 (02 Oct 2021 08:00) (62 - 87)  BP: 107/46 (02 Oct 2021 08:00) (93/47 - 139/60)  BP(mean): 65 (02 Oct 2021 08:00) (61 - 85)  RR: 20 (02 Oct 2021 08:00) (18 - 35)  SpO2: 95% (02 Oct 2021 08:00) (95% - 100%)      10-01-21 @ 07:01  -  10-02-21 @ 07:00  --------------------------------------------------------  IN: 2371 mL / OUT: 600 mL / NET: 1771 mL    10-02-21 @ 07:01  -  10-02-21 @ 08:47  --------------------------------------------------------  IN: 60 mL / OUT: 0 mL / NET: 60 mL        Mode: PRVC, RR (machine): 18, TV (machine): 400, FiO2: 30, PEEP: 5, ITime: 1, MAP: 14, PIP: 24      LABS:                        8.7    9.05  )-----------( 437      ( 01 Oct 2021 06:56 )             29.6     10-01    138  |  106  |  10  ----------------------------<  155<H>  4.0   |  26  |  1.02    Ca    8.7      01 Oct 2021 06:56    TPro  6.7  /  Alb  1.9<L>  /  TBili  0.4  /  DBili  x   /  AST  27  /  ALT  17  /  AlkPhos  107  09-30              WBC:  WBC Count: 9.05 K/uL (10-01 @ 06:56)  WBC Count: 10.07 K/uL (09-30 @ 08:59)  WBC Count: 7.44 K/uL (09-29 @ 07:08)      MICROBIOLOGY:  RECENT CULTURES:  09-26 .Sputum Sputum Pseudomonas aeruginosa (Carbapenem Resistant)  Serratia marcescens  Stenotrophomonas maltophilia   Few polymorphonuclear leukocytes per low power field  Rare Squamous epithelial cells per low power field  Few Gram Negative Rods per oil power field   Moderate Pseudomonas aeruginosa (Carbapenem Resistant)  Moderate Serratia marcescens  Moderate Stenotrophomonas maltophilia  Normal Respiratory Elvira absent    09-25 .Blood Blood-Peripheral Proteus mirabilis XXXX   No Growth Final                    Sodium:  Sodium, Serum: 138 mmol/L (10-01 @ 06:56)  Sodium, Serum: 138 mmol/L (09-30 @ 08:59)  Sodium, Serum: 141 mmol/L (09-29 @ 07:08)      1.02 mg/dL 10-01 @ 06:56  0.91 mg/dL 09-30 @ 08:59  0.92 mg/dL 09-29 @ 07:08      Hemoglobin:  Hemoglobin: 8.7 g/dL (10-01 @ 06:56)  Hemoglobin: 8.3 g/dL (09-30 @ 08:59)  Hemoglobin: 7.5 g/dL (09-29 @ 07:08)      Platelets: Platelet Count - Automated: 437 K/uL (10-01 @ 06:56)  Platelet Count - Automated: 472 K/uL (09-30 @ 08:59)  Platelet Count - Automated: 346 K/uL (09-29 @ 07:08)      LIVER FUNCTIONS - ( 30 Sep 2021 08:59 )  Alb: 1.9 g/dL / Pro: 6.7 g/dL / ALK PHOS: 107 U/L / ALT: 17 U/L DA / AST: 27 U/L / GGT: x                 RADIOLOGY & ADDITIONAL STUDIES:      MICROBIOLOGY:  RECENT CULTURES:  09-26 .Sputum Sputum Pseudomonas aeruginosa (Carbapenem Resistant)  Serratia marcescens  Stenotrophomonas maltophilia   Few polymorphonuclear leukocytes per low power field  Rare Squamous epithelial cells per low power field  Few Gram Negative Rods per oil power field   Moderate Pseudomonas aeruginosa (Carbapenem Resistant)  Moderate Serratia marcescens  Moderate Stenotrophomonas maltophilia  Normal Respiratory Elvira absent    09-25 .Blood Blood-Peripheral Proteus mirabilis XXXX   No Growth Final

## 2021-10-02 NOTE — PROGRESS NOTE ADULT - ASSESSMENT
74 y/o female with a PMHx of DM2, pna, quadriplegia, advanced dementia, DM, CVA, GERD, HTN presents to the ED c/o recent admitted to [facility] a 9/7-9/15 after being found unresponsive at nursing home, concerned about post operative distress, hypotensive signs of aspirational pna, with volume dissipated, treated with levosa, zosyn. Admitted to ICU for aspirational pna. Urinary culture proteus, which was also sensitive to Zosyn. Also appears to have paronychia of toe which she had Zyvox during hospital stay. She was found to be anemic so she was transfused of 1 L RBC, no signs of active bleeding and pt stabilized. Pt was stabilized and d/c back to rehab. Patient reported for reported respiratory distress. Pt found to be on normal vent settings, does have increased  respiratory rate. No further hx can be obtained at this time as pt is nonverbal.  sepsis - shock - chr resp failure - anemia - acute infection - pna - dysphagia - anoxia - dementia     poss DC on Monday - CM notes reviewed -     on ABX  HOB elev  oral hygiene  skin care  assist with ADL  full code - spoke with  - HCP  cx in progress  ABG noted  vent support  monitor VS and HD and Sat  CCM notes reviewed  vent support in progress - not a candidate for weaning  old records reviewed

## 2021-10-02 NOTE — PROGRESS NOTE ADULT - ASSESSMENT
75y Female PMHx HTN, DM type 2, CVA, recent Cardiac Arrest, Chronic Respiratory Failure, Vent Dependant s/p trach & PEG, Anemia with GI blood loss, and Dementia.  Admitted 9/25/2021  acute respiratory distress.  Found to have mucus plug and suctioned, distressed resolved.          VAP vs ASP PNA   Acute on Chronic Respiratory Failure   UTI  RYAN Resolved       Planning to return to SNF tomorrow  On  Zosyn ID following   Sputum culture peudomonas and serratia   Ucx proteus Mirabilis  titrate settings to maintain SaO2 >90%, or pH >7.25  Peridex oral care  Aggressive pulmonary toilet   Maintains bowel regimen

## 2021-10-02 NOTE — PROGRESS NOTE ADULT - SUBJECTIVE AND OBJECTIVE BOX
Date/Time Patient Seen:  		  Referring MD:   Data Reviewed	       Patient is a 75y old  Female who presents with a chief complaint of Respiratory distress. (01 Oct 2021 19:24)      Subjective/HPI     PAST MEDICAL & SURGICAL HISTORY:  Dementia of frontal lobe type    Aphasic stroke    Diabetes mellitus    Respiratory failure    Hypertension    GERD (gastroesophageal reflux disease)    Constipation    Respiratory failure    CVA (cerebral vascular accident)    HTN (hypertension)    DM (diabetes mellitus)    Advanced dementia    COVID-19 virus detected    Quadriplegia    Pneumonia    Type II diabetes mellitus    Hx of appendectomy    Gastrostomy in place    Tracheostomy in place    Tracheostomy tube present    Feeding by G-tube          Medication list         MEDICATIONS  (STANDING):  aspirin  chewable 81 milliGRAM(s) Oral daily  bisacodyl Suppository 10 milliGRAM(s) Rectal at bedtime  chlorhexidine 0.12% Liquid 15 milliLiter(s) Oral Mucosa every 12 hours  dextrose 40% Gel 15 Gram(s) Oral once  dextrose 5%. 1000 milliLiter(s) (50 mL/Hr) IV Continuous <Continuous>  dextrose 5%. 1000 milliLiter(s) (100 mL/Hr) IV Continuous <Continuous>  dextrose 50% Injectable 25 Gram(s) IV Push once  dextrose 50% Injectable 12.5 Gram(s) IV Push once  dextrose 50% Injectable 25 Gram(s) IV Push once  glucagon  Injectable 1 milliGRAM(s) IntraMuscular once  heparin   Injectable 5000 Unit(s) SubCutaneous every 8 hours  insulin glargine Injectable (LANTUS) 36 Unit(s) SubCutaneous at bedtime  insulin lispro (ADMELOG) corrective regimen sliding scale   SubCutaneous every 6 hours  ipratropium 17 MICROgram(s) HFA Inhaler 1 Puff(s) Inhalation every 6 hours  lactobacillus acidophilus 1 Tablet(s) Oral daily  LORazepam     Tablet 1 milliGRAM(s) Oral every 6 hours  methocarbamol 250 milliGRAM(s) Oral two times a day  pantoprazole  Injectable 40 milliGRAM(s) IV Push daily  piperacillin/tazobactam IVPB.. 3.375 Gram(s) IV Intermittent every 8 hours  povidone iodine 10% Solution 1 Application(s) Topical daily  senna 1 Tablet(s) Oral daily  sucralfate suspension 1 Gram(s) Enteral Tube four times a day    MEDICATIONS  (PRN):  ALBUTerol    90 MICROgram(s) HFA Inhaler 1 Puff(s) Inhalation every 6 hours PRN Shortness of Breath and/or Wheezing  LORazepam   Injectable 1 milliGRAM(s) IV Push every 6 hours PRN agtation and vent dyssynchrony  polyethylene glycol 3350 17 Gram(s) Oral every 12 hours PRN Constipation  simethicone 80 milliGRAM(s) Chew two times a day PRN Dyspepsia         Vitals log        ICU Vital Signs Last 24 Hrs  T(C): 36.3 (02 Oct 2021 04:00), Max: 37 (01 Oct 2021 08:15)  T(F): 97.4 (02 Oct 2021 04:00), Max: 98.6 (01 Oct 2021 08:15)  HR: 67 (02 Oct 2021 06:07) (62 - 87)  BP: 93/47 (02 Oct 2021 06:07) (93/47 - 139/60)  BP(mean): 61 (02 Oct 2021 06:07) (61 - 85)  ABP: --  ABP(mean): --  RR: 18 (02 Oct 2021 06:07) (18 - 35)  SpO2: 99% (02 Oct 2021 06:07) (95% - 100%)       Mode: prvc  RR (machine): 18  TV (machine): 400  FiO2: 30  PEEP: 5  ITime: 1.15  MAP: 12  PIP: 23      Input and Output:  I&O's Detail    30 Sep 2021 07:01  -  01 Oct 2021 07:00  --------------------------------------------------------  IN:    Enteral Tube Flush: 375 mL    Free Water: 600 mL    IV PiggyBack: 300 mL    Jevity 1.5: 600 mL  Total IN: 1875 mL    OUT:    Voided (mL): 600 mL  Total OUT: 600 mL    Total NET: 1275 mL      01 Oct 2021 07:01  -  02 Oct 2021 06:45  --------------------------------------------------------  IN:    Enteral Tube Flush: 201 mL    Free Water: 850 mL    IV PiggyBack: 200 mL    Jevity 1.5: 690 mL    Other (mL): 400 mL  Total IN: 2341 mL    OUT:    Voided (mL): 600 mL  Total OUT: 600 mL    Total NET: 1741 mL          Lab Data                        8.7    9.05  )-----------( 437      ( 01 Oct 2021 06:56 )             29.6     10-01    138  |  106  |  10  ----------------------------<  155<H>  4.0   |  26  |  1.02    Ca    8.7      01 Oct 2021 06:56    TPro  6.7  /  Alb  1.9<L>  /  TBili  0.4  /  DBili  x   /  AST  27  /  ALT  17  /  AlkPhos  107  09-30            Review of Systems	      Objective     Physical Examination    heart s1s2  lung dec BS  abd soft  head nc      Pertinent Lab findings & Imaging      Annalise:  NO   Adequate UO     I&O's Detail    30 Sep 2021 07:01  -  01 Oct 2021 07:00  --------------------------------------------------------  IN:    Enteral Tube Flush: 375 mL    Free Water: 600 mL    IV PiggyBack: 300 mL    Jevity 1.5: 600 mL  Total IN: 1875 mL    OUT:    Voided (mL): 600 mL  Total OUT: 600 mL    Total NET: 1275 mL      01 Oct 2021 07:01  -  02 Oct 2021 06:45  --------------------------------------------------------  IN:    Enteral Tube Flush: 201 mL    Free Water: 850 mL    IV PiggyBack: 200 mL    Jevity 1.5: 690 mL    Other (mL): 400 mL  Total IN: 2341 mL    OUT:    Voided (mL): 600 mL  Total OUT: 600 mL    Total NET: 1741 mL               Discussed with:     Cultures:	        Radiology

## 2021-10-02 NOTE — PROGRESS NOTE ADULT - SUBJECTIVE AND OBJECTIVE BOX
[facility] - [facility]     75y Female PMHx HTN, DM type 2, CVA, recent Cardiac Arrest, Chronic Respiratory Failure, Vent Dependant s/p trach & PEG, Anemia with GI blood loss, and Dementia.  Admitted 9/25/2021  acute respiratory distress.  Found to have mucus plug and suctioned, distressed resolved.        --- PMHx.---    Dementia of frontal lobe type    Aphasic stroke    Diabetes mellitus    Respiratory failure    Hypertension    GERD (gastroesophageal reflux disease)    Constipation    Respiratory failure    CVA (cerebral vascular accident)    HTN (hypertension)    DM (diabetes mellitus)    Advanced dementia    COVID-19 virus detected    Quadriplegia    Pneumonia    Type II diabetes mellitus           Review Of Systems: All reviewed systems were negative.    Previous Medical Record reviewed and case has been discussed with [facility].    --- Medications ---    ALBUTerol    90 MICROgram(s) HFA Inhaler 1 Puff(s) PRN  aspirin  chewable 81 milliGRAM(s)  bisacodyl Suppository 10 milliGRAM(s)  chlorhexidine 0.12% Liquid 15 milliLiter(s)  dextrose 40% Gel 15 Gram(s)  dextrose 5%. 1000 milliLiter(s)  dextrose 5%. 1000 milliLiter(s)  dextrose 50% Injectable 25 Gram(s)  dextrose 50% Injectable 12.5 Gram(s)  dextrose 50% Injectable 25 Gram(s)  glucagon  Injectable 1 milliGRAM(s)  heparin   Injectable 5000 Unit(s)  insulin glargine Injectable (LANTUS) 36 Unit(s)  insulin lispro (ADMELOG) corrective regimen sliding scale    ipratropium 17 MICROgram(s) HFA Inhaler 1 Puff(s)  lactobacillus acidophilus 1 Tablet(s)  LORazepam     Tablet 1 milliGRAM(s)  LORazepam   Injectable 1 milliGRAM(s) PRN  methocarbamol 250 milliGRAM(s)  pantoprazole  Injectable 40 milliGRAM(s)  polyethylene glycol 3350 17 Gram(s) PRN  povidone iodine 10% Solution 1 Application(s)  senna 1 Tablet(s)  simethicone 80 milliGRAM(s) PRN  sucralfate suspension 1 Gram(s)      DVT Prophylaxis : ****    Advanced Directives :****    T(C): 37.1 (10-02-21 @ 16:00), Max: 37.2 (10-02-21 @ 11:30)  HR: 73 (10-02-21 @ 20:31)  BP: 119/57 (10-02-21 @ 18:00)  RR: 19 (10-02-21 @ 18:00)  SpO2: 100% (10-02-21 @ 20:31)    --- Labratories ---                           8.7    9.05  )-----------( 437      ( 01 Oct 2021 06:56 )             29.6    10-01    138  |  106  |  10  ----------------------------<  155<H>  4.0   |  26  |  1.02    Ca    8.7      01 Oct 2021 06:56              (Reviewing data, imaging, discussing with multidisciplinary team, non inclusive of procedures, discussing goals of care with patient/family)

## 2021-10-02 NOTE — CHART NOTE - NSCHARTNOTEFT_GEN_A_CORE
Assessment:     Factors impacting intake: [ ] none [ ] nausea  [ ] vomiting [ ] diarrhea [ ] constipation  [ ]chewing problems [ ] swallowing issues  [ ] other:     Diet Presciption: Diet, NPO with Tube Feed:   Tube Feeding Modality: Gastrostomy  Jevity 1.5 Horacio  Total Volume for 24 Hours (mL): 720  Continuous  Starting Tube Feed Rate {mL per Hour}: 30  Until Goal Tube Feed Rate (mL per Hour): 30  Tube Feed Duration (in Hours): 24  Tube Feed Start Time: 19:00 (09-26-21 @ 18:08)    Intake:     Current Weight: Weight (kg): 61.2 (09-25 @ 14:29)  % Weight Change    Pertinent Medications: MEDICATIONS  (STANDING):  aspirin  chewable 81 milliGRAM(s) Oral daily  bisacodyl Suppository 10 milliGRAM(s) Rectal at bedtime  chlorhexidine 0.12% Liquid 15 milliLiter(s) Oral Mucosa every 12 hours  dextrose 40% Gel 15 Gram(s) Oral once  dextrose 5%. 1000 milliLiter(s) (50 mL/Hr) IV Continuous <Continuous>  dextrose 5%. 1000 milliLiter(s) (100 mL/Hr) IV Continuous <Continuous>  dextrose 50% Injectable 25 Gram(s) IV Push once  dextrose 50% Injectable 12.5 Gram(s) IV Push once  dextrose 50% Injectable 25 Gram(s) IV Push once  glucagon  Injectable 1 milliGRAM(s) IntraMuscular once  heparin   Injectable 5000 Unit(s) SubCutaneous every 8 hours  insulin glargine Injectable (LANTUS) 36 Unit(s) SubCutaneous at bedtime  insulin lispro (ADMELOG) corrective regimen sliding scale   SubCutaneous every 6 hours  ipratropium 17 MICROgram(s) HFA Inhaler 1 Puff(s) Inhalation every 6 hours  lactobacillus acidophilus 1 Tablet(s) Oral daily  LORazepam     Tablet 1 milliGRAM(s) Oral every 6 hours  methocarbamol 250 milliGRAM(s) Oral two times a day  pantoprazole  Injectable 40 milliGRAM(s) IV Push daily  piperacillin/tazobactam IVPB.. 3.375 Gram(s) IV Intermittent every 8 hours  povidone iodine 10% Solution 1 Application(s) Topical daily  senna 1 Tablet(s) Oral daily  sucralfate suspension 1 Gram(s) Enteral Tube four times a day    MEDICATIONS  (PRN):  ALBUTerol    90 MICROgram(s) HFA Inhaler 1 Puff(s) Inhalation every 6 hours PRN Shortness of Breath and/or Wheezing  LORazepam   Injectable 1 milliGRAM(s) IV Push every 6 hours PRN agtation and vent dyssynchrony  polyethylene glycol 3350 17 Gram(s) Oral every 12 hours PRN Constipation  simethicone 80 milliGRAM(s) Chew two times a day PRN Dyspepsia    Pertinent Labs: 10-01 Na138 mmol/L Glu 155 mg/dL<H> K+ 4.0 mmol/L Cr  1.02 mg/dL BUN 10 mg/dL 09-27 Phos 2.6 mg/dL 09-30 Alb 1.9 g/dL<L>     CAPILLARY BLOOD GLUCOSE      POCT Blood Glucose.: 134 mg/dL (02 Oct 2021 05:44)  POCT Blood Glucose.: 182 mg/dL (01 Oct 2021 23:52)  POCT Blood Glucose.: 155 mg/dL (01 Oct 2021 22:48)  POCT Blood Glucose.: 141 mg/dL (01 Oct 2021 17:03)  POCT Blood Glucose.: 155 mg/dL (01 Oct 2021 11:58)    Skin:     Estimated Needs:   [ ] no change since previous assessment  [ ] recalculated:     Previous Nutrition Diagnosis:   [ ] Inadequate Energy Intake [ ]Inadequate Oral Intake [ ] Excessive Energy Intake   [ ] Underweight [ ] Increased Nutrient Needs [ ] Overweight/Obesity   [ ] Altered GI Function [ ] Unintended Weight Loss [ ] Food & Nutrition Related Knowledge Deficit [ ] Malnutrition     Nutrition Diagnosis is [ ] ongoing  [ ] resolved [ ] not applicable     New Nutrition Diagnosis: [ ] not applicable       Interventions:   Recommend  [ ] Change Diet To:  [ ] Nutrition Supplement  [ ] Nutrition Support  [ ] Other:     Monitoring and Evaluation:   [ ] PO intake [ x ] Tolerance to diet prescription [ x ] weights [ x ] labs[ x ] follow up per protocol  [ ] other: Assessment: ( 9/29/21) RD note:   Pt seen for nutrition follow-up. Pt is a 74 y/o F with PMHx of HTN, DM type 2, CVA, recent Cardiac Arrest, Chronic Respiratory Failure, Vent Dependant s/p trach & PEG, Anemia with GI blood loss, and Dementia who was sent from [facility] facility for acute respiratory distress. Patient was admitted to [facility] about 3 weeks ago s/p cardiac arrest with B/L aspiration PNA, Sepsis, Proteus Mirabilis UTI, and infected foot ulcer, and discharged 10 days ago. Patient is awake & alert but non verbal.    Pt came from [facility] facility where pt was previously receiving EN feeds via PEG of Glucerna 1.5 @ 50 ml/hr x 20hr for a total volume of 1000 ml/day, providing pt w/ 1500kcal and 82.5g protein per day (+addtl 200ml before and after, +200ml at 2am= 600ml fluid/day in addition to tube feeding hourly flush). No food allergies per chart review. No edema noted. + BM 9/28. Skin: R knee skin tear, wounds; L/R 1st toe, pressure injuries; R 4th toe DTI, R 5th toe DTI, L medial malleolus stage I. Pt previously NPO receiving IVF during initial nutrition assessment. Pt now NPO w/ TF of Jevity 1.5 @ 30ml/hr x 24 hrs, which is providing 720 ml total volume, 1,080 kcal, 46 g protein. Pt also receiving 200ml free water q 6 hrs. Pt currently tolerating TF well. Recommend increasing Jevity 1.5 TF rate from 30 ml/hr to 40 ml/hr in order to meet pt's estimated needs. Jevity 1.5 @ 40 ml/hr will provide 960 ml total volume, 1,400 kcal, 61.2 g protein/day. Also recommend no carb prosource BID to provide an additional 120kcal, 30 g protein/day. Recommend addition of MVI and vitamin C supplementation to facilitate wound healing. RD to follow-up and will continue to monitor pt's nutrition status.    Factors impacting intake: [ ] none [ ] nausea  [ ] vomiting [ ] diarrhea [ ] constipation  [ ]chewing problems [ ] swallowing issues  [ ] other:     Diet Presciption: Diet, NPO with Tube Feed:   Tube Feeding Modality: Gastrostomy  Jevity 1.5 Horacio  Total Volume for 24 Hours (mL): 720  Continuous  Starting Tube Feed Rate {mL per Hour}: 30  Until Goal Tube Feed Rate (mL per Hour): 30  Tube Feed Duration (in Hours): 24  Tube Feed Start Time: 19:00 (09-26-21 @ 18:08)    Intake:     Current Weight: Weight (kg): 61.2 (09-25 @ 14:29)  % Weight Change    Pertinent Medications: MEDICATIONS  (STANDING):  aspirin  chewable 81 milliGRAM(s) Oral daily  bisacodyl Suppository 10 milliGRAM(s) Rectal at bedtime  chlorhexidine 0.12% Liquid 15 milliLiter(s) Oral Mucosa every 12 hours  dextrose 40% Gel 15 Gram(s) Oral once  dextrose 5%. 1000 milliLiter(s) (50 mL/Hr) IV Continuous <Continuous>  dextrose 5%. 1000 milliLiter(s) (100 mL/Hr) IV Continuous <Continuous>  dextrose 50% Injectable 25 Gram(s) IV Push once  dextrose 50% Injectable 12.5 Gram(s) IV Push once  dextrose 50% Injectable 25 Gram(s) IV Push once  glucagon  Injectable 1 milliGRAM(s) IntraMuscular once  heparin   Injectable 5000 Unit(s) SubCutaneous every 8 hours  insulin glargine Injectable (LANTUS) 36 Unit(s) SubCutaneous at bedtime  insulin lispro (ADMELOG) corrective regimen sliding scale   SubCutaneous every 6 hours  ipratropium 17 MICROgram(s) HFA Inhaler 1 Puff(s) Inhalation every 6 hours  lactobacillus acidophilus 1 Tablet(s) Oral daily  LORazepam     Tablet 1 milliGRAM(s) Oral every 6 hours  methocarbamol 250 milliGRAM(s) Oral two times a day  pantoprazole  Injectable 40 milliGRAM(s) IV Push daily  piperacillin/tazobactam IVPB.. 3.375 Gram(s) IV Intermittent every 8 hours  povidone iodine 10% Solution 1 Application(s) Topical daily  senna 1 Tablet(s) Oral daily  sucralfate suspension 1 Gram(s) Enteral Tube four times a day    MEDICATIONS  (PRN):  ALBUTerol    90 MICROgram(s) HFA Inhaler 1 Puff(s) Inhalation every 6 hours PRN Shortness of Breath and/or Wheezing  LORazepam   Injectable 1 milliGRAM(s) IV Push every 6 hours PRN agtation and vent dyssynchrony  polyethylene glycol 3350 17 Gram(s) Oral every 12 hours PRN Constipation  simethicone 80 milliGRAM(s) Chew two times a day PRN Dyspepsia    Pertinent Labs: 10-01 Na138 mmol/L Glu 155 mg/dL<H> K+ 4.0 mmol/L Cr  1.02 mg/dL BUN 10 mg/dL 09-27 Phos 2.6 mg/dL 09-30 Alb 1.9 g/dL<L>     CAPILLARY BLOOD GLUCOSE      POCT Blood Glucose.: 134 mg/dL (02 Oct 2021 05:44)  POCT Blood Glucose.: 182 mg/dL (01 Oct 2021 23:52)  POCT Blood Glucose.: 155 mg/dL (01 Oct 2021 22:48)  POCT Blood Glucose.: 141 mg/dL (01 Oct 2021 17:03)  POCT Blood Glucose.: 155 mg/dL (01 Oct 2021 11:58)    Skin:     Estimated Needs:   [ ] no change since previous assessment  [ ] recalculated:     Previous Nutrition Diagnosis:   [ ] Inadequate Energy Intake [ ]Inadequate Oral Intake [ ] Excessive Energy Intake   [ ] Underweight [ ] Increased Nutrient Needs [ ] Overweight/Obesity   [ ] Altered GI Function [ ] Unintended Weight Loss [ ] Food & Nutrition Related Knowledge Deficit [ ] Malnutrition     Nutrition Diagnosis is [ ] ongoing  [ ] resolved [ ] not applicable     New Nutrition Diagnosis: [ ] not applicable       Interventions:   Recommend  [ ] Change Diet To:  [ ] Nutrition Supplement  [ ] Nutrition Support  [ ] Other:     Monitoring and Evaluation:   [ ] PO intake [ x ] Tolerance to diet prescription [ x ] weights [ x ] labs[ x ] follow up per protocol  [ ] other: Assessment: ( 9/29/21) RD note:   Pt seen for nutrition follow-up. Pt is a 76 y/o F [facility] resident  with PMHx of HTN, DM type 2, CVA, recent Cardiac Arrest, Chronic Respiratory Failure, Vent Dependant s/p trach & PEG, Anemia with GI blood loss, and Dementia who was sent from [facility] facility for acute respiratory distress. Patient with a recent  [facility] admission  s/p cardiac arrest with B/L aspiration PNA, Sepsis, Proteus Mirabilis UTI, and infected foot ulcer, .    Pt was receiving at  [facility] facility  EN feeds via PEG of Glucerna 1.5 @ 50 ml/hr x 20hr for a total volume of 1000 ml/day, providing pt w/ 1500kcal and 82.5g protein per day (+addtl 200ml before and after, +200ml at 2am= 600ml fluid/day in addition to tube feeding hourly flush). Presently there is no edema noted. +  diarhhea per nsg. Per nsg, not related to feeds;  want herSkin: R knee skin tear, wounds; L/R 1st toe, pressure injuries; R 4th toe DTI, R 5th toe DTI, L medial malleolus stage I. Pt previously NPO receiving IVF during initial nutrition assessment. Pt now NPO w/ TF of Jevity 1.5 @ 30ml/hr x 24 hrs, which is providing 720 ml total volume, 1,080 kcal, 46 g protein. Pt also receiving 200ml free water q 6 hrs. Pt currently tolerating TF well. Recommend increasing Jevity 1.5 TF rate from 30 ml/hr to 40 ml/hr in order to meet pt's estimated needs. Jevity 1.5 @ 40 ml/hr will provide 960 ml total volume, 1,400 kcal, 61.2 g protein/day. Also recommend no carb prosource BID to provide an additional 120kcal, 30 g protein/day. Recommend addition of MVI and vitamin C supplementation to facilitate wound healing. RD to follow-up and will continue to monitor pt's nutrition status.    Factors impacting intake: [ ] none [ ] nausea  [ ] vomiting [ ] diarrhea [ ] constipation  [ ]chewing problems [ ] swallowing issues  [ ] other:     Diet Presciption: Diet, NPO with Tube Feed:   Tube Feeding Modality: Gastrostomy  Jevity 1.5 Horacio  Total Volume for 24 Hours (mL): 720  Continuous  Starting Tube Feed Rate {mL per Hour}: 30  Until Goal Tube Feed Rate (mL per Hour): 30  Tube Feed Duration (in Hours): 24  Tube Feed Start Time: 19:00 (09-26-21 @ 18:08)    Intake:     Current Weight: Weight (kg): 61.2 (09-25 @ 14:29)  % Weight Change    Pertinent Medications: MEDICATIONS  (STANDING):  aspirin  chewable 81 milliGRAM(s) Oral daily  bisacodyl Suppository 10 milliGRAM(s) Rectal at bedtime  chlorhexidine 0.12% Liquid 15 milliLiter(s) Oral Mucosa every 12 hours  dextrose 40% Gel 15 Gram(s) Oral once  dextrose 5%. 1000 milliLiter(s) (50 mL/Hr) IV Continuous <Continuous>  dextrose 5%. 1000 milliLiter(s) (100 mL/Hr) IV Continuous <Continuous>  dextrose 50% Injectable 25 Gram(s) IV Push once  dextrose 50% Injectable 12.5 Gram(s) IV Push once  dextrose 50% Injectable 25 Gram(s) IV Push once  glucagon  Injectable 1 milliGRAM(s) IntraMuscular once  heparin   Injectable 5000 Unit(s) SubCutaneous every 8 hours  insulin glargine Injectable (LANTUS) 36 Unit(s) SubCutaneous at bedtime  insulin lispro (ADMELOG) corrective regimen sliding scale   SubCutaneous every 6 hours  ipratropium 17 MICROgram(s) HFA Inhaler 1 Puff(s) Inhalation every 6 hours  lactobacillus acidophilus 1 Tablet(s) Oral daily  LORazepam     Tablet 1 milliGRAM(s) Oral every 6 hours  methocarbamol 250 milliGRAM(s) Oral two times a day  pantoprazole  Injectable 40 milliGRAM(s) IV Push daily  piperacillin/tazobactam IVPB.. 3.375 Gram(s) IV Intermittent every 8 hours  povidone iodine 10% Solution 1 Application(s) Topical daily  senna 1 Tablet(s) Oral daily  sucralfate suspension 1 Gram(s) Enteral Tube four times a day    MEDICATIONS  (PRN):  ALBUTerol    90 MICROgram(s) HFA Inhaler 1 Puff(s) Inhalation every 6 hours PRN Shortness of Breath and/or Wheezing  LORazepam   Injectable 1 milliGRAM(s) IV Push every 6 hours PRN agtation and vent dyssynchrony  polyethylene glycol 3350 17 Gram(s) Oral every 12 hours PRN Constipation  simethicone 80 milliGRAM(s) Chew two times a day PRN Dyspepsia    Pertinent Labs: 10-01 Na138 mmol/L Glu 155 mg/dL<H> K+ 4.0 mmol/L Cr  1.02 mg/dL BUN 10 mg/dL 09-27 Phos 2.6 mg/dL 09-30 Alb 1.9 g/dL<L>     CAPILLARY BLOOD GLUCOSE      POCT Blood Glucose.: 134 mg/dL (02 Oct 2021 05:44)  POCT Blood Glucose.: 182 mg/dL (01 Oct 2021 23:52)  POCT Blood Glucose.: 155 mg/dL (01 Oct 2021 22:48)  POCT Blood Glucose.: 141 mg/dL (01 Oct 2021 17:03)  POCT Blood Glucose.: 155 mg/dL (01 Oct 2021 11:58)    Skin:     Estimated Needs:   [ ] no change since previous assessment  [ ] recalculated:     Previous Nutrition Diagnosis:   [ ] Inadequate Energy Intake [ ]Inadequate Oral Intake [ ] Excessive Energy Intake   [ ] Underweight [ ] Increased Nutrient Needs [ ] Overweight/Obesity   [ ] Altered GI Function [ ] Unintended Weight Loss [ ] Food & Nutrition Related Knowledge Deficit [ ] Malnutrition     Nutrition Diagnosis is [ ] ongoing  [ ] resolved [ ] not applicable     New Nutrition Diagnosis: [ ] not applicable       Interventions:   Recommend  [ ] Change Diet To:  [ ] Nutrition Supplement  [ ] Nutrition Support  [ ] Other:     Monitoring and Evaluation:   [ ] PO intake [ x ] Tolerance to diet prescription [ x ] weights [ x ] labs[ x ] follow up per protocol  [ ] other: Assessment: ( 9/29/21) RD note:   Pt seen for nutrition follow-up. Pt is a 74 y/o F [facility] resident  with PMHx of HTN, DM type 2, CVA, recent Cardiac Arrest, Chronic Respiratory Failure, Vent Dependant s/p trach & PEG, Anemia with GI blood loss, and Dementia who was sent from [facility] facility for acute respiratory distress. Patient with a recent  [facility] admission  s/p cardiac arrest with B/L aspiration PNA, Sepsis, Proteus Mirabilis UTI, and infected foot ulcer, .    Pt was receiving at  [facility] facility  EN feeds via PEG of Glucerna 1.5 @ 50 ml/hr x 20hr for a total volume of 1000 ml/day, providing pt w/ 1500kcal and 82.5g protein per day (+addtl 200ml before and after, +200ml at 2am= 600ml fluid/day in addition to tube feeding hourly flush). Presently there is no edema noted. +  diarrhea per nsg. Per nsg, not related to feeds;  wants her to continue to receive senna  to prevent constipation. Skin: pressure injuries; R 4th toe DTI, R 5th toe DTI, L medial malleolus stage I. Pt presently receiving TF of Jevity 1.5 @ 30ml/hr x 24 hrs, which is providing 720 ml total volume, 1,080 kcal, 46 g protein. Pt also receiving 200ml free water q 6 hrs. Pt currently tolerating TF well. Recommend increasing Jevity 1.5 TF rate from 30 ml/hr to 40 ml/hr in order to meet pt's estimated needs. Jevity 1.5 @ 40 ml/hr will provide 960 ml total volume, 1,400 kcal, 61.2 g protein/day. Also recommend no carb prosource BID to provide an additional 120kcal, 30 g protein/day. Recommend addition of MVI and vitamin C supplementation to facilitate wound healing. RD to follow-up and will continue to monitor pt's nutrition status.    Factors impacting intake: [ ] none [ ] nausea  [ ] vomiting [ ] diarrhea [ ] constipation  [ ]chewing problems [ ] swallowing issues  [ ] other:     Diet Presciption: Diet, NPO with Tube Feed:   Tube Feeding Modality: Gastrostomy  Jevity 1.5 Horacio  Total Volume for 24 Hours (mL): 720  Continuous  Starting Tube Feed Rate {mL per Hour}: 30  Until Goal Tube Feed Rate (mL per Hour): 30  Tube Feed Duration (in Hours): 24  Tube Feed Start Time: 19:00 (09-26-21 @ 18:08)    Intake:     Current Weight: Weight (kg): 61.2 (09-25 @ 14:29)  % Weight Change    Pertinent Medications: MEDICATIONS  (STANDING):  aspirin  chewable 81 milliGRAM(s) Oral daily  bisacodyl Suppository 10 milliGRAM(s) Rectal at bedtime  chlorhexidine 0.12% Liquid 15 milliLiter(s) Oral Mucosa every 12 hours  dextrose 40% Gel 15 Gram(s) Oral once  dextrose 5%. 1000 milliLiter(s) (50 mL/Hr) IV Continuous <Continuous>  dextrose 5%. 1000 milliLiter(s) (100 mL/Hr) IV Continuous <Continuous>  dextrose 50% Injectable 25 Gram(s) IV Push once  dextrose 50% Injectable 12.5 Gram(s) IV Push once  dextrose 50% Injectable 25 Gram(s) IV Push once  glucagon  Injectable 1 milliGRAM(s) IntraMuscular once  heparin   Injectable 5000 Unit(s) SubCutaneous every 8 hours  insulin glargine Injectable (LANTUS) 36 Unit(s) SubCutaneous at bedtime  insulin lispro (ADMELOG) corrective regimen sliding scale   SubCutaneous every 6 hours  ipratropium 17 MICROgram(s) HFA Inhaler 1 Puff(s) Inhalation every 6 hours  lactobacillus acidophilus 1 Tablet(s) Oral daily  LORazepam     Tablet 1 milliGRAM(s) Oral every 6 hours  methocarbamol 250 milliGRAM(s) Oral two times a day  pantoprazole  Injectable 40 milliGRAM(s) IV Push daily  piperacillin/tazobactam IVPB.. 3.375 Gram(s) IV Intermittent every 8 hours  povidone iodine 10% Solution 1 Application(s) Topical daily  senna 1 Tablet(s) Oral daily  sucralfate suspension 1 Gram(s) Enteral Tube four times a day    MEDICATIONS  (PRN):  ALBUTerol    90 MICROgram(s) HFA Inhaler 1 Puff(s) Inhalation every 6 hours PRN Shortness of Breath and/or Wheezing  LORazepam   Injectable 1 milliGRAM(s) IV Push every 6 hours PRN agtation and vent dyssynchrony  polyethylene glycol 3350 17 Gram(s) Oral every 12 hours PRN Constipation  simethicone 80 milliGRAM(s) Chew two times a day PRN Dyspepsia    Pertinent Labs: 10-01 Na138 mmol/L Glu 155 mg/dL<H> K+ 4.0 mmol/L Cr  1.02 mg/dL BUN 10 mg/dL 09-27 Phos 2.6 mg/dL 09-30 Alb 1.9 g/dL<L>     CAPILLARY BLOOD GLUCOSE      POCT Blood Glucose.: 134 mg/dL (02 Oct 2021 05:44)  POCT Blood Glucose.: 182 mg/dL (01 Oct 2021 23:52)  POCT Blood Glucose.: 155 mg/dL (01 Oct 2021 22:48)  POCT Blood Glucose.: 141 mg/dL (01 Oct 2021 17:03)  POCT Blood Glucose.: 155 mg/dL (01 Oct 2021 11:58)    Skin:     Estimated Needs:   [ ] no change since previous assessment  [ ] recalculated:     Previous Nutrition Diagnosis:   [ ] Inadequate Energy Intake [ ]Inadequate Oral Intake [ ] Excessive Energy Intake   [ ] Underweight [ ] Increased Nutrient Needs [ ] Overweight/Obesity   [ ] Altered GI Function [ ] Unintended Weight Loss [ ] Food & Nutrition Related Knowledge Deficit [ ] Malnutrition     Nutrition Diagnosis is [ ] ongoing  [ ] resolved [ ] not applicable     New Nutrition Diagnosis: [ ] not applicable       Interventions:   Recommend  [ ] Change Diet To:  [ ] Nutrition Supplement  [ ] Nutrition Support  [ ] Other:     Monitoring and Evaluation:   [ ] PO intake [ x ] Tolerance to diet prescription [ x ] weights [ x ] labs[ x ] follow up per protocol  [ ] other: Assessment: ( 9/29/21) RD note:   Pt seen for nutrition follow-up. Pt is a 74 y/o F [facility] resident  with PMHx of HTN, DM type 2, CVA, recent Cardiac Arrest, Chronic Respiratory Failure, Vent Dependant s/p trach & PEG, Anemia with GI blood loss, and Dementia who was sent from [facility] facility for acute respiratory distress. Patient with a recent  [facility] admission  s/p cardiac arrest with B/L aspiration PNA, Sepsis, Proteus Mirabilis UTI, and infected foot ulcer, .    Pt was receiving at  [facility] facility  EN feeds via PEG of Glucerna 1.5 @ 50 ml/hr x 20hr for a total volume of 1000 ml/day, providing pt w/ 1500kcal and 82.5g protein per day (+addtl 200ml before and after, +200ml at 2am= 600ml fluid/day in addition to tube feeding hourly flush). Presently there is no edema noted. +  diarrhea per nsg. Per nsg, not related to feeds;  wants her to continue to receive senna  to prevent constipation. Skin: pressure injuries; R 4th toe DTI, R 5th toe DTI, L medial malleolus stage I. Pt presently receiving TF of Jevity 1.5 @ 30ml/hr x 24 hrs, which is providing 720 ml total volume, 1,080 kcal, 46 g protein. Pt also receiving 200ml free water q 6 hrs. Pt currently tolerating TF well. Recommend increasing Jevity 1.5 TF rate from 30 ml/hr to 50 ml/hr ( over 20 hours per day )  in order to meet pt's estimated needs. Jevity 1.5 @ 50 ml/hr ( x 20  will provide 1000 ml total volume, 1,500 kcal, 63.8 g protein/day. Also recommend no carb prosource BID to provide an additional 120kcal, 30 g protein/day for a total of 1620 kcals and 93.8 gm pro Recommend addition of MVI and vitamin C supplementation to facilitate wound healing. FS over the last 24 hours: 155,141, 155, 182, 134, 131 therefore ok to keep on Jevity 1.5. If glucose levels ^ , would change to Glucerna 1.5 ( same rate and volume) . The recommended TF regimen with no carb prosource will better meet estimated needs    Factors impacting intake: [ ] none [ ] nausea  [ ] vomiting [ ] diarrhea [ ] constipation  [ ]chewing problems [ ] swallowing issues  [X ] other: pt is exclusively being tube fed    Diet Prescription: Diet, NPO with Tube Feed:   Tube Feeding Modality: Gastrostomy  Jevity 1.5 Horacio  Total Volume for 24 Hours (mL): 720  Continuous  Starting Tube Feed Rate {mL per Hour}: 30  Until Goal Tube Feed Rate (mL per Hour): 30  Tube Feed Duration (in Hours): 24  Tube Feed Start Time: 19:00 (09-26-21 @ 18:08)    Intake: nil    Current Weight: Weight (kg): 61.2 (09-25 @ 14:29) 10/2/21: 56 kg/ 123.4#  % Weight Change : -11.5#/8.5% likely multifactorial fluid shifts and suboptimal nutrition fupport    Pertinent Medications: MEDICATIONS  (STANDING):  aspirin  chewable 81 milliGRAM(s) Oral daily  bisacodyl Suppository 10 milliGRAM(s) Rectal at bedtime  chlorhexidine 0.12% Liquid 15 milliLiter(s) Oral Mucosa every 12 hours  dextrose 40% Gel 15 Gram(s) Oral once  dextrose 5%. 1000 milliLiter(s) (50 mL/Hr) IV Continuous <Continuous>  dextrose 5%. 1000 milliLiter(s) (100 mL/Hr) IV Continuous <Continuous>  dextrose 50% Injectable 25 Gram(s) IV Push once  dextrose 50% Injectable 12.5 Gram(s) IV Push once  dextrose 50% Injectable 25 Gram(s) IV Push once  glucagon  Injectable 1 milliGRAM(s) IntraMuscular once  heparin   Injectable 5000 Unit(s) SubCutaneous every 8 hours  insulin glargine Injectable (LANTUS) 36 Unit(s) SubCutaneous at bedtime  insulin lispro (ADMELOG) corrective regimen sliding scale   SubCutaneous every 6 hours  ipratropium 17 MICROgram(s) HFA Inhaler 1 Puff(s) Inhalation every 6 hours  lactobacillus acidophilus 1 Tablet(s) Oral daily  LORazepam     Tablet 1 milliGRAM(s) Oral every 6 hours  methocarbamol 250 milliGRAM(s) Oral two times a day  pantoprazole  Injectable 40 milliGRAM(s) IV Push daily  piperacillin/tazobactam IVPB.. 3.375 Gram(s) IV Intermittent every 8 hours  povidone iodine 10% Solution 1 Application(s) Topical daily  senna 1 Tablet(s) Oral daily  sucralfate suspension 1 Gram(s) Enteral Tube four times a day    MEDICATIONS  (PRN):  ALBUTerol    90 MICROgram(s) HFA Inhaler 1 Puff(s) Inhalation every 6 hours PRN Shortness of Breath and/or Wheezing  LORazepam   Injectable 1 milliGRAM(s) IV Push every 6 hours PRN agtation and vent dyssynchrony  polyethylene glycol 3350 17 Gram(s) Oral every 12 hours PRN Constipation  simethicone 80 milliGRAM(s) Chew two times a day PRN Dyspepsia    Pertinent Labs: 10-01 Na138 mmol/L Glu 155 mg/dL<H> K+ 4.0 mmol/L Cr  1.02 mg/dL BUN 10 mg/dL 09-27 Phos 2.6 mg/dL 09-30 Alb 1.9 g/dL<L>     CAPILLARY BLOOD GLUCOSE      POCT Blood Glucose.: 134 mg/dL (02 Oct 2021 05:44)  POCT Blood Glucose.: 182 mg/dL (01 Oct 2021 23:52)  POCT Blood Glucose.: 155 mg/dL (01 Oct 2021 22:48)  POCT Blood Glucose.: 141 mg/dL (01 Oct 2021 17:03)  POCT Blood Glucose.: 155 mg/dL (01 Oct 2021 11:58)    Skin: see above    Estimated Needs:   [ ] no change since previous assessment  [ X] recalculated:     Previous Nutrition Diagnosis:   [ ] Inadequate Energy Intake [ ]Inadequate Oral Intake [ ] Excessive Energy Intake   [ ] Underweight [ ] Increased Nutrient Needs [ ] Overweight/Obesity   [ ] Altered GI Function [ ] Unintended Weight Loss [ ] Food & Nutrition Related Knowledge Deficit [ ] Malnutrition     Nutrition Diagnosis is [ ] ongoing  [ ] resolved [ ] not applicable     New Nutrition Diagnosis: [ ] not applicable       Interventions:   Recommend  [ ] Change Diet To:  [ ] Nutrition Supplement  [ ] Nutrition Support  [ ] Other:     Monitoring and Evaluation:   [ ] PO intake [ x ] Tolerance to diet prescription [ x ] weights [ x ] labs[ x ] follow up per protocol  [ ] other: Assessment: ( 9/29/21) RD note:   Pt seen for nutrition follow-up. Pt is a 74 y/o F [facility] resident  with PMHx of HTN, DM type 2, CVA, recent Cardiac Arrest, Chronic Respiratory Failure, Vent Dependant s/p trach & PEG, Anemia with GI blood loss, and Dementia who was sent from [facility] facility for acute respiratory distress. Patient with a recent  [facility] admission  s/p cardiac arrest with B/L aspiration PNA, Sepsis, Proteus Mirabilis UTI, and infected foot ulcer, .    Pt was receiving at  [facility] facility  EN feeds via PEG of Glucerna 1.5 @ 50 ml/hr x 20hr for a total volume of 1000 ml/day, providing pt w/ 1500kcal and 82.5g protein per day (+addtl 200ml before and after, +200ml at 2am= 600ml fluid/day in addition to tube feeding hourly flush). Presently there is no edema noted. +  diarrhea per nsg. Per nsg, not related to feeds;  wants her to continue to receive senna  to prevent constipation. Skin: pressure injuries; R 4th toe DTI, R 5th toe DTI, L medial malleolus stage I. Pt presently receiving TF of Jevity 1.5 @ 30ml/hr x 24 hrs, which is providing 720 ml total volume, 1,080 kcal, 46 g protein. Pt also receiving 200ml free water q 6 hrs. Pt currently tolerating TF well. Recommend increasing Jevity 1.5 TF rate from 30 ml/hr to 50 ml/hr ( over 20 hours per day )  in order to meet pt's estimated needs. Jevity 1.5 @ 50 ml/hr ( x 20  will provide 1000 ml total volume, 1,500 kcal, 63.8 g protein/day. Also recommend no carb prosource BID to provide an additional 120kcal, 30 g protein/day for a total of 1620 kcals and 93.8 gm pro Recommend addition of MVI and vitamin C supplementation to facilitate wound healing. FS over the last 24 hours: 155,141, 155, 182, 134, 131 therefore ok to keep on Jevity 1.5. If glucose levels ^ , would change to Glucerna 1.5 ( same rate and volume) . The recommended TF regimen with no carb prosource will better meet estimated needs    Factors impacting intake: [ ] none [ ] nausea  [ ] vomiting [ ] diarrhea [ ] constipation  [ ]chewing problems [ ] swallowing issues  [X ] other: pt is exclusively being tube fed    Diet Prescription: Diet, NPO with Tube Feed:   Tube Feeding Modality: Gastrostomy  Jevity 1.5 Horacio  Total Volume for 24 Hours (mL): 720  Continuous  Starting Tube Feed Rate {mL per Hour}: 30  Until Goal Tube Feed Rate (mL per Hour): 30  Tube Feed Duration (in Hours): 24  Tube Feed Start Time: 19:00 (09-26-21 @ 18:08)    Intake: nil    Current Weight: Weight (kg): 61.2 (09-25 @ 14:29) 10/2/21: 56 kg/ 123.4#  % Weight Change : -11.5#/8.5% likely multifactorial fluid shifts and suboptimal nutrition fupport    Pertinent Medications: MEDICATIONS  (STANDING):  aspirin  chewable 81 milliGRAM(s) Oral daily  bisacodyl Suppository 10 milliGRAM(s) Rectal at bedtime  chlorhexidine 0.12% Liquid 15 milliLiter(s) Oral Mucosa every 12 hours  dextrose 40% Gel 15 Gram(s) Oral once  dextrose 5%. 1000 milliLiter(s) (50 mL/Hr) IV Continuous <Continuous>  dextrose 5%. 1000 milliLiter(s) (100 mL/Hr) IV Continuous <Continuous>  dextrose 50% Injectable 25 Gram(s) IV Push once  dextrose 50% Injectable 12.5 Gram(s) IV Push once  dextrose 50% Injectable 25 Gram(s) IV Push once  glucagon  Injectable 1 milliGRAM(s) IntraMuscular once  heparin   Injectable 5000 Unit(s) SubCutaneous every 8 hours  insulin glargine Injectable (LANTUS) 36 Unit(s) SubCutaneous at bedtime  insulin lispro (ADMELOG) corrective regimen sliding scale   SubCutaneous every 6 hours  ipratropium 17 MICROgram(s) HFA Inhaler 1 Puff(s) Inhalation every 6 hours  lactobacillus acidophilus 1 Tablet(s) Oral daily  LORazepam     Tablet 1 milliGRAM(s) Oral every 6 hours  methocarbamol 250 milliGRAM(s) Oral two times a day  pantoprazole  Injectable 40 milliGRAM(s) IV Push daily  piperacillin/tazobactam IVPB.. 3.375 Gram(s) IV Intermittent every 8 hours  povidone iodine 10% Solution 1 Application(s) Topical daily  senna 1 Tablet(s) Oral daily  sucralfate suspension 1 Gram(s) Enteral Tube four times a day    MEDICATIONS  (PRN):  ALBUTerol    90 MICROgram(s) HFA Inhaler 1 Puff(s) Inhalation every 6 hours PRN Shortness of Breath and/or Wheezing  LORazepam   Injectable 1 milliGRAM(s) IV Push every 6 hours PRN agtation and vent dyssynchrony  polyethylene glycol 3350 17 Gram(s) Oral every 12 hours PRN Constipation  simethicone 80 milliGRAM(s) Chew two times a day PRN Dyspepsia    Pertinent Labs: 10-01 Na138 mmol/L Glu 155 mg/dL<H> K+ 4.0 mmol/L Cr  1.02 mg/dL BUN 10 mg/dL 09-27 Phos 2.6 mg/dL 09-30 Alb 1.9 g/dL<L>     CAPILLARY BLOOD GLUCOSE      POCT Blood Glucose.: 134 mg/dL (02 Oct 2021 05:44)  POCT Blood Glucose.: 182 mg/dL (01 Oct 2021 23:52)  POCT Blood Glucose.: 155 mg/dL (01 Oct 2021 22:48)  POCT Blood Glucose.: 141 mg/dL (01 Oct 2021 17:03)  POCT Blood Glucose.: 155 mg/dL (01 Oct 2021 11:58)    Skin: see above    Estimated Needs:   [ ] no change since previous assessment  [ X] recalculated: ( 25-30 kcals/kg ABW) 9125-4857 kcals     Previous Nutrition Diagnosis:   [ ] Inadequate Energy Intake [ ]Inadequate Oral Intake [ ] Excessive Energy Intake   [ ] Underweight [X ] Increased Nutrient Needs [ ] Overweight/Obesity   [ ] Altered GI Function [ ] Unintended Weight Loss [ ] Food & Nutrition Related Knowledge Deficit [ ] Malnutrition     Nutrition Diagnosis is [X ] ongoing  +  [ X] inadequate energy intake      New Nutrition Diagnosis: [ ] not applicable       Interventions:   Recommend  [ ] Change Diet To:  [ ] Nutrition Supplement  [ X] Nutrition Support : recommendations as above and pending sign off in orders  [ ] Other:     Monitoring and Evaluation:   [ ] PO intake [ x ] Tolerance to diet prescription [ x ] weights [ x ] labs[ x ] follow up per protocol  [ ] other:

## 2021-10-02 NOTE — PROGRESS NOTE ADULT - SUBJECTIVE AND OBJECTIVE BOX
Chief Complaint: Respiratory distress    Interval Events: No events overnight.    Review of Systems:  Unable to obtain    Physical Exam:  Vital Signs Last 24 Hrs  T(C): 36.3 (02 Oct 2021 04:00), Max: 37 (01 Oct 2021 12:30)  T(F): 97.4 (02 Oct 2021 04:00), Max: 98.6 (01 Oct 2021 12:30)  HR: 69 (02 Oct 2021 08:00) (62 - 87)  BP: 107/46 (02 Oct 2021 08:00) (93/47 - 139/60)  BP(mean): 65 (02 Oct 2021 08:00) (61 - 85)  RR: 20 (02 Oct 2021 08:00) (18 - 35)  SpO2: 95% (02 Oct 2021 08:00) (95% - 100%)  General: Chronically ill appearing  HEENT: Trach  Neck: No JVD, no carotid bruit  Lungs: Coarse bilaterally  CV: RRR, nl S1/S2, no M/R/G  Abdomen: S/NT/ND, +BS  Extremities: No LE edema, no cyanosis  Neuro: AAOx0  Skin: No rash    Labs:    10-01    138  |  106  |  10  ----------------------------<  155<H>  4.0   |  26  |  1.02    Ca    8.7      01 Oct 2021 06:56                          8.7    9.05  )-----------( 437      ( 01 Oct 2021 06:56 )             29.6         Telemetry: Sinus rhythm

## 2021-10-02 NOTE — PROGRESS NOTE ADULT - SUBJECTIVE AND OBJECTIVE BOX
Patient is a 75y Female whom presented to the hospital with ckd and manasa     PAST MEDICAL & SURGICAL HISTORY:  Dementia of frontal lobe type    Aphasic stroke    Diabetes mellitus    Respiratory failure    Hypertension    GERD (gastroesophageal reflux disease)    Constipation    Respiratory failure    CVA (cerebral vascular accident)    HTN (hypertension)    DM (diabetes mellitus)    Advanced dementia    COVID-19 virus detected    Quadriplegia    Pneumonia    Type II diabetes mellitus    Hx of appendectomy    Gastrostomy in place    Tracheostomy in place    Tracheostomy tube present    Feeding by G-tube        MEDICATIONS  (STANDING):  aspirin  chewable 81 milliGRAM(s) Oral daily  chlorhexidine 0.12% Liquid 15 milliLiter(s) Oral Mucosa every 12 hours  chlorhexidine 4% Liquid 1 Application(s) Topical <User Schedule>  dextrose 40% Gel 15 Gram(s) Oral once  dextrose 5%. 1000 milliLiter(s) (50 mL/Hr) IV Continuous <Continuous>  dextrose 5%. 1000 milliLiter(s) (100 mL/Hr) IV Continuous <Continuous>  dextrose 50% Injectable 25 Gram(s) IV Push once  dextrose 50% Injectable 12.5 Gram(s) IV Push once  dextrose 50% Injectable 25 Gram(s) IV Push once  glucagon  Injectable 1 milliGRAM(s) IntraMuscular once  heparin   Injectable 5000 Unit(s) SubCutaneous every 8 hours  insulin lispro (ADMELOG) corrective regimen sliding scale   SubCutaneous every 6 hours  ipratropium 17 MICROgram(s) HFA Inhaler 1 Puff(s) Inhalation every 6 hours  lactated ringers. 1000 milliLiter(s) (80 mL/Hr) IV Continuous <Continuous>  lactobacillus acidophilus 1 Tablet(s) Oral daily  norepinephrine Infusion 0.05 MICROgram(s)/kG/Min (5.74 mL/Hr) IV Continuous <Continuous>  pantoprazole  Injectable 40 milliGRAM(s) IV Push daily  piperacillin/tazobactam IVPB.. 3.375 Gram(s) IV Intermittent every 8 hours  senna 1 Tablet(s) Oral daily      Allergies    codeine (Hives)    Intolerances        SOCIAL HISTORY:  Denies ETOh,Smoking,     FAMILY HISTORY:  No pertinent family history in first degree relatives        REVIEW OF SYSTEMS:  unable to obtained a good review system                                                                                                                                        8.7    9.05  )-----------( 437      ( 01 Oct 2021 06:56 )             29.6       CBC Full  -  ( 01 Oct 2021 06:56 )  WBC Count : 9.05 K/uL  RBC Count : 3.19 M/uL  Hemoglobin : 8.7 g/dL  Hematocrit : 29.6 %  Platelet Count - Automated : 437 K/uL  Mean Cell Volume : 92.8 fl  Mean Cell Hemoglobin : 27.3 pg  Mean Cell Hemoglobin Concentration : 29.4 gm/dL  Auto Neutrophil # : 7.15 K/uL  Auto Lymphocyte # : 1.02 K/uL  Auto Monocyte # : 0.64 K/uL  Auto Eosinophil # : 0.12 K/uL  Auto Basophil # : 0.03 K/uL  Auto Neutrophil % : 79.0 %  Auto Lymphocyte % : 11.3 %  Auto Monocyte % : 7.1 %  Auto Eosinophil % : 1.3 %  Auto Basophil % : 0.3 %      10-01    138  |  106  |  10  ----------------------------<  155<H>  4.0   |  26  |  1.02    Ca    8.7      01 Oct 2021 06:56        CAPILLARY BLOOD GLUCOSE      POCT Blood Glucose.: 131 mg/dL (02 Oct 2021 11:14)  POCT Blood Glucose.: 134 mg/dL (02 Oct 2021 05:44)  POCT Blood Glucose.: 182 mg/dL (01 Oct 2021 23:52)  POCT Blood Glucose.: 155 mg/dL (01 Oct 2021 22:48)      Vital Signs Last 24 Hrs  T(C): 37.2 (02 Oct 2021 11:30), Max: 37.2 (02 Oct 2021 11:30)  T(F): 99 (02 Oct 2021 11:30), Max: 99 (02 Oct 2021 11:30)  HR: 66 (02 Oct 2021 14:18) (62 - 85)  BP: 106/45 (02 Oct 2021 10:00) (93/47 - 139/60)  BP(mean): 64 (02 Oct 2021 10:00) (61 - 84)  RR: 24 (02 Oct 2021 10:00) (18 - 33)  SpO2: 100% (02 Oct 2021 14:18) (95% - 100%)                   PHYSICAL EXAM:    Constitutional: NAD  HEENT: conjunctive   clear   Neck:  No JVD  Respiratory: decrease bs b/l   Cardiovascular: S1 and S2  Gastrointestinal: BS+, soft, pos peg   Extremities: No peripheral edema

## 2021-10-02 NOTE — PROGRESS NOTE ADULT - ASSESSMENT
VIRIDIANAAMI BREA 75 f [facility] S 9/24/2021   DR ILIANA KEMP     REVIEW OF SYMPTOMS      Able to give (reliable) ROS  NO     PHYSICAL EXAM    HEENT Unremarkable  atraumatic   RESP Fair air entry EXP prolonged    Harsh breath sound Resp distres mild   CARDIAC S1 S2 No S3     NO JVD    ABDOMEN SOFT BS PRESENT NOT DISTENDED No hepatosplenomegaly   PEDAL EDEMA present No calf tenderness  NO rash       PROBLEMS.        VAP poa 9/25  UTI poa 9/25  VDRF (pmh)  PEG (pmh)  ANOXIC ENCEPH (pmh)     WORSENING CXR 9/27/2021   ANEMIA 9/29/2021 Hb 7.5      EVENTS.    10/1/2021 w 9 Hb 8.7 Cr 1   9/30/2021 w 10 Hb 8.3 Na 138 Cr .9     BEST PRACTICE ISSUES.                                                  HEAD OF BED ELEVATION. Yes  DVT PROPHYLAXIS.   [facility]                                       SQUIRES PROPHYLAXIS.  carafate (9/25)                                                                                         DIET.            jevity 1.5 30/h 9/26/2021 gt               INFECTION PROPHYLAXIS.   chlorhexidine .12% 9/26/2021                      GENERAL ISSUES  GO ADVANCED DIRECTIVE.                                         ALLERGY.  codeine                            WEIGHT.      9/25/2021 61                              BMI.                 9/25/2021 22       PATIENT DATA   TUBES  PROCEDURES.     poa Trach  poa peg    ABG.   9/26 7p 40%/vent 743/39/76    OXYGENATION.                   VITALS/IO/VENT/DRIPS.    10/2/2021 70 93/47 18   10/2/2021 ac 18/400/5/.3     PROBLEM ASSESSMENT/RECOMMENDATIONS.  COVID STATUS.  scv2 9/25/2021 (-)  spk ab 9/26/2021 (+) 250   RESP.  VDRF.   Lung protective ventilation  target po 90-95%  clinically stable     INFECTION.  VAP.   UTI.  W 9/25-9/26-9/27/2021 w 13-9.6-7.8   pr 9/26/2021 pr 2.1   ua 9/25/2021 w 26-50 l estrase mod   9/27/2021 cxr bl perihilar and lower lobe consolidations progressive   eventration r diaphr   ct cap 9/25/2021 dense bl cpnsolidations new mediastinal lne Small bl effs dilated fluid filled rectum   mrsa 9/27 (-)   9/26 sp   1) stenotrophomonas   2) carbapenem resistant pseucomonas   3) serratia  urine c 9/25 uc 100k proteus   zosyn 9/25 Abio course to end 9/30     COPD.  prov p (9/25)  atrov (9/25)  under control   ANEMIA.  Hb 9/25-9/26-9/27-9/29/2021 hb 10 - 9.1-8.4-7.5      ASSESSMENT RECOMMENDATIONS .    VDRF anoxic encephalopathy patient  admitted 9/25/2021 with VAP UTI resp distress started on bsab      TIME SPENT   Over 36 minutes aggregate critical care time spent on encounter; activities included   direct patient care, counseling and/or coordinating care reviewing notes, lab data/ imaging , discussion with multidisciplinary team/ patient  /family and explaining in detail risks, benefits, alternatives  of the recommendations     CHAPINCITO GUIDRY 75 f [place] S 9/24/2021   DR ILIANA KEMP

## 2021-10-02 NOTE — PROGRESS NOTE ADULT - SUBJECTIVE AND OBJECTIVE BOX
Patient is a 75y old  Female who presents with a chief complaint of Respiratory distress. (02 Oct 2021 08:46)      INTERVAL HPI/OVERNIGHT EVENTS:  Patient is seen and examined.  No history available due to underlying medical condition.    Pain Location & Control:     MEDICATIONS  (STANDING):  aspirin  chewable 81 milliGRAM(s) Oral daily  bisacodyl Suppository 10 milliGRAM(s) Rectal at bedtime  chlorhexidine 0.12% Liquid 15 milliLiter(s) Oral Mucosa every 12 hours  dextrose 40% Gel 15 Gram(s) Oral once  dextrose 5%. 1000 milliLiter(s) (50 mL/Hr) IV Continuous <Continuous>  dextrose 5%. 1000 milliLiter(s) (100 mL/Hr) IV Continuous <Continuous>  dextrose 50% Injectable 25 Gram(s) IV Push once  dextrose 50% Injectable 12.5 Gram(s) IV Push once  dextrose 50% Injectable 25 Gram(s) IV Push once  glucagon  Injectable 1 milliGRAM(s) IntraMuscular once  heparin   Injectable 5000 Unit(s) SubCutaneous every 8 hours  insulin glargine Injectable (LANTUS) 36 Unit(s) SubCutaneous at bedtime  insulin lispro (ADMELOG) corrective regimen sliding scale   SubCutaneous every 6 hours  ipratropium 17 MICROgram(s) HFA Inhaler 1 Puff(s) Inhalation every 6 hours  lactobacillus acidophilus 1 Tablet(s) Oral daily  LORazepam     Tablet 1 milliGRAM(s) Oral every 6 hours  methocarbamol 250 milliGRAM(s) Oral two times a day  pantoprazole  Injectable 40 milliGRAM(s) IV Push daily  piperacillin/tazobactam IVPB.. 3.375 Gram(s) IV Intermittent every 8 hours  povidone iodine 10% Solution 1 Application(s) Topical daily  senna 1 Tablet(s) Oral daily  sucralfate suspension 1 Gram(s) Enteral Tube four times a day    MEDICATIONS  (PRN):  ALBUTerol    90 MICROgram(s) HFA Inhaler 1 Puff(s) Inhalation every 6 hours PRN Shortness of Breath and/or Wheezing  LORazepam   Injectable 1 milliGRAM(s) IV Push every 6 hours PRN agtation and vent dyssynchrony  polyethylene glycol 3350 17 Gram(s) Oral every 12 hours PRN Constipation  simethicone 80 milliGRAM(s) Chew two times a day PRN Dyspepsia      Allergies    codeine (Hives)    Intolerances        Vital Signs Last 24 Hrs  T(C): 36.3 (02 Oct 2021 04:00), Max: 37 (01 Oct 2021 12:30)  T(F): 97.4 (02 Oct 2021 04:00), Max: 98.6 (01 Oct 2021 12:30)  HR: 69 (02 Oct 2021 08:00) (62 - 87)  BP: 107/46 (02 Oct 2021 08:00) (93/47 - 139/60)  BP(mean): 65 (02 Oct 2021 08:00) (61 - 85)  RR: 20 (02 Oct 2021 08:00) (18 - 35)  SpO2: 95% (02 Oct 2021 08:00) (95% - 100%)        I&O's Detail    01 Oct 2021 07:01  -  02 Oct 2021 07:00  --------------------------------------------------------  IN:    Enteral Tube Flush: 201 mL    Free Water: 850 mL    IV PiggyBack: 200 mL    Jevity 1.5: 720 mL    Other (mL): 400 mL  Total IN: 2371 mL    OUT:    Voided (mL): 600 mL  Total OUT: 600 mL    Total NET: 1771 mL      02 Oct 2021 07:01  -  02 Oct 2021 08:55  --------------------------------------------------------  IN:    Jevity 1.5: 60 mL  Total IN: 60 mL    OUT:  Total OUT: 0 mL    Total NET: 60 mL          LABS:      Ca    8.7        01 Oct 2021 06:56          CAPILLARY BLOOD GLUCOSE      POCT Blood Glucose.: 134 mg/dL (02 Oct 2021 05:44)  POCT Blood Glucose.: 182 mg/dL (01 Oct 2021 23:52)  POCT Blood Glucose.: 155 mg/dL (01 Oct 2021 22:48)  POCT Blood Glucose.: 141 mg/dL (01 Oct 2021 17:03)  POCT Blood Glucose.: 155 mg/dL (01 Oct 2021 11:58)        Cultures  Culture Results:   Moderate Pseudomonas aeruginosa (Carbapenem Resistant)  Moderate Serratia marcescens  Moderate Stenotrophomonas maltophilia  Normal Respiratory Elvira absent (09-26 @ 12:50)  Culture Results:   No Growth Final (09-25 @ 21:55)  Culture Results:   No Growth Final (09-25 @ 21:55)  Culture Results:   >100,000 CFU/ml Proteus mirabilis (09-25 @ 21:55)        Culture - Sputum (collected 09-26-21 @ 12:50)  Source: .Sputum Sputum  Gram Stain (09-26-21 @ 15:43):    Few polymorphonuclear leukocytes per low power field    Rare Squamous epithelial cells per low power field    Few Gram Negative Rods per oil power field  Final Report (09-28-21 @ 14:54):    Moderate Pseudomonas aeruginosa (Carbapenem Resistant)    Moderate Serratia marcescens    Moderate Stenotrophomonas maltophilia    Normal Respiratory Elvira absent  Organism: Pseudomonas aeruginosa (Carbapenem Resistant)  Serratia marcescens  Stenotrophomonas maltophilia (09-28-21 @ 14:54)  Organism: Stenotrophomonas maltophilia (09-28-21 @ 14:54)      -  Ceftazidime: R >16      -  Levofloxacin: S 1      -  Trimethoprim/Sulfamethoxazole: S <=0.5/9.5      Method Type: PRATIK  Organism: Serratia marcescens (09-28-21 @ 14:54)      -  Amikacin: S <=16      -  Amoxicillin/Clavulanic Acid: R >16/8      -  Ampicillin: R >16 These ampicillin results predict results for amoxicillin      -  Ampicillin/Sulbactam: R >16/8 Enterobacter, Citrobacter, and Serratia may develop resistance during prolonged therapy (3-4 days)      -  Aztreonam: S <=4      -  Cefazolin: R >16 Enterobacter, Citrobacter, and Serratia may develop resistance during prolonged therapy (3-4 days)      -  Cefepime: S <=2      -  Cefoxitin: R <=8      -  Ceftriaxone: I 2 Enterobacter, Citrobacter, and Serratia may develop resistance during prolonged therapy      -  Ciprofloxacin: R >2      -  Ertapenem: S <=0.5      -  Gentamicin: S <=2      -  Levofloxacin: R 2      -  Meropenem: S <=1      -  Piperacillin/Tazobactam: S <=8      -  Tobramycin: S <=2      -  Trimethoprim/Sulfamethoxazole: S <=0.5/9.5      Method Type: PRATIK  Organism: Pseudomonas aeruginosa (Carbapenem Resistant) (09-28-21 @ 14:54)      -  Amikacin: S <=16      -  Aztreonam: S <=4      -  Cefepime: S 4      -  Ceftazidime: S 8      -  Ciprofloxacin: S <=0.25      -  Gentamicin: S <=2      -  Imipenem: R >8      -  Levofloxacin: S <=0.5      -  Meropenem: I 4      -  Piperacillin/Tazobactam: S <=8      -  Tobramycin: S <=2      Method Type: PRATIK    Culture - Urine (collected 09-25-21 @ 21:55)  Source: Clean Catch Clean Catch (Midstream)  Final Report (09-29-21 @ 11:48):    >100,000 CFU/ml Proteus mirabilis  Organism: Proteus mirabilis (09-29-21 @ 11:48)  Organism: Proteus mirabilis (09-29-21 @ 11:48)      -  Amikacin: S <=16      -  Amoxicillin/Clavulanic Acid: S <=8/4      -  Ampicillin: S <=8 These ampicillin results predict results for amoxicillin      -  Ampicillin/Sulbactam: S <=4/2 Enterobacter, Citrobacter, and Serratia may develop resistance during prolonged therapy (3-4 days)      -  Aztreonam: S <=4      -  Cefazolin: S 4 (MIC_CL_COM_ENTERIC_[id number]) For uncomplicated UTI with K. pneumoniae, E. coli, or P. mirablis: PRATIK <=16 is sensitive and PRATIK >=32 is resistant. This also predicts results for oral agents cefaclor, cefdinir, cefpodoxime, cefprozil, cefuroxime axetil, cephalexin and locarbef for uncomplicated UTI. Note that some isolates may be susceptible to these agents while testing resistant to cefazolin.      -  Cefepime: S <=2      -  Cefoxitin: S <=8      -  Ceftriaxone: S <=1 Enterobacter, Citrobacter, and Serratia may develop resistance during prolonged therapy      -  Ciprofloxacin: R >2      -  Ertapenem: S <=0.5      -  Gentamicin: S <=2      -  Levofloxacin: R 2      -  Meropenem: S <=1      -  Nitrofurantoin: R >64 Should not be used to treat pyelonephritis      -  Piperacillin/Tazobactam: S <=8      -  Tobramycin: S 4      -  Trimethoprim/Sulfamethoxazole: R >2/38      Method Type: PRATIK    Culture - Blood (collected 09-25-21 @ 21:55)  Source: .Blood Blood-Peripheral  Final Report (09-30-21 @ 22:00):    No Growth Final    Culture - Blood (collected 09-25-21 @ 21:55)  Source: .Blood Blood-Peripheral  Final Report (09-30-21 @ 22:00):    No Growth Final        RADIOLOGY & ADDITIONAL TESTS:    Imaging Personally Reviewed:  [ ] YES  [ ] NO    Consultant(s) Notes Reviewed:  [ ] YES  [ ] NO    Care Discussed with Consultants/Other Providers [ x] YES  [ ] NO

## 2021-10-03 LAB
ALBUMIN SERPL ELPH-MCNC: 2 G/DL — LOW (ref 3.3–5)
ALP SERPL-CCNC: 104 U/L — SIGNIFICANT CHANGE UP (ref 30–120)
ALT FLD-CCNC: 19 U/L DA — SIGNIFICANT CHANGE UP (ref 10–60)
ANION GAP SERPL CALC-SCNC: 8 MMOL/L — SIGNIFICANT CHANGE UP (ref 5–17)
AST SERPL-CCNC: 14 U/L — SIGNIFICANT CHANGE UP (ref 10–40)
BILIRUB SERPL-MCNC: 0.4 MG/DL — SIGNIFICANT CHANGE UP (ref 0.2–1.2)
BUN SERPL-MCNC: 11 MG/DL — SIGNIFICANT CHANGE UP (ref 7–23)
CALCIUM SERPL-MCNC: 8.2 MG/DL — LOW (ref 8.4–10.5)
CHLORIDE SERPL-SCNC: 107 MMOL/L — SIGNIFICANT CHANGE UP (ref 96–108)
CO2 SERPL-SCNC: 26 MMOL/L — SIGNIFICANT CHANGE UP (ref 22–31)
CREAT SERPL-MCNC: 0.9 MG/DL — SIGNIFICANT CHANGE UP (ref 0.5–1.3)
GLUCOSE SERPL-MCNC: 104 MG/DL — HIGH (ref 70–99)
HCT VFR BLD CALC: 28.4 % — LOW (ref 34.5–45)
HGB BLD-MCNC: 8.4 G/DL — LOW (ref 11.5–15.5)
MCHC RBC-ENTMCNC: 27.2 PG — SIGNIFICANT CHANGE UP (ref 27–34)
MCHC RBC-ENTMCNC: 29.6 GM/DL — LOW (ref 32–36)
MCV RBC AUTO: 91.9 FL — SIGNIFICANT CHANGE UP (ref 80–100)
NRBC # BLD: 0 /100 WBCS — SIGNIFICANT CHANGE UP (ref 0–0)
PLATELET # BLD AUTO: 382 K/UL — SIGNIFICANT CHANGE UP (ref 150–400)
POTASSIUM SERPL-MCNC: 4.4 MMOL/L — SIGNIFICANT CHANGE UP (ref 3.5–5.3)
POTASSIUM SERPL-SCNC: 4.4 MMOL/L — SIGNIFICANT CHANGE UP (ref 3.5–5.3)
PROT SERPL-MCNC: 5.9 G/DL — LOW (ref 6–8.3)
RBC # BLD: 3.09 M/UL — LOW (ref 3.8–5.2)
RBC # FLD: 19.9 % — HIGH (ref 10.3–14.5)
SODIUM SERPL-SCNC: 141 MMOL/L — SIGNIFICANT CHANGE UP (ref 135–145)
WBC # BLD: 6.86 K/UL — SIGNIFICANT CHANGE UP (ref 3.8–10.5)
WBC # FLD AUTO: 6.86 K/UL — SIGNIFICANT CHANGE UP (ref 3.8–10.5)

## 2021-10-03 PROCEDURE — 99233 SBSQ HOSP IP/OBS HIGH 50: CPT

## 2021-10-03 RX ADMIN — CHLORHEXIDINE GLUCONATE 15 MILLILITER(S): 213 SOLUTION TOPICAL at 17:16

## 2021-10-03 RX ADMIN — Medication 1 PUFF(S): at 00:24

## 2021-10-03 RX ADMIN — INSULIN GLARGINE 36 UNIT(S): 100 INJECTION, SOLUTION SUBCUTANEOUS at 23:03

## 2021-10-03 RX ADMIN — Medication 1 GRAM(S): at 17:16

## 2021-10-03 RX ADMIN — PANTOPRAZOLE SODIUM 40 MILLIGRAM(S): 20 TABLET, DELAYED RELEASE ORAL at 11:44

## 2021-10-03 RX ADMIN — Medication 1 APPLICATION(S): at 11:45

## 2021-10-03 RX ADMIN — METHOCARBAMOL 250 MILLIGRAM(S): 500 TABLET, FILM COATED ORAL at 05:31

## 2021-10-03 RX ADMIN — Medication 81 MILLIGRAM(S): at 11:44

## 2021-10-03 RX ADMIN — SENNA PLUS 1 TABLET(S): 8.6 TABLET ORAL at 11:44

## 2021-10-03 RX ADMIN — CHLORHEXIDINE GLUCONATE 15 MILLILITER(S): 213 SOLUTION TOPICAL at 05:30

## 2021-10-03 RX ADMIN — Medication 1 PUFF(S): at 13:41

## 2021-10-03 RX ADMIN — Medication 1 GRAM(S): at 05:30

## 2021-10-03 RX ADMIN — ALBUTEROL 1 PUFF(S): 90 AEROSOL, METERED ORAL at 13:42

## 2021-10-03 RX ADMIN — HEPARIN SODIUM 5000 UNIT(S): 5000 INJECTION INTRAVENOUS; SUBCUTANEOUS at 14:14

## 2021-10-03 RX ADMIN — Medication 1 MILLIGRAM(S): at 17:16

## 2021-10-03 RX ADMIN — Medication 1 MILLIGRAM(S): at 23:05

## 2021-10-03 RX ADMIN — ALBUTEROL 1 PUFF(S): 90 AEROSOL, METERED ORAL at 07:28

## 2021-10-03 RX ADMIN — Medication 1 MILLIGRAM(S): at 00:22

## 2021-10-03 RX ADMIN — METHOCARBAMOL 250 MILLIGRAM(S): 500 TABLET, FILM COATED ORAL at 17:16

## 2021-10-03 RX ADMIN — HEPARIN SODIUM 5000 UNIT(S): 5000 INJECTION INTRAVENOUS; SUBCUTANEOUS at 05:30

## 2021-10-03 RX ADMIN — Medication 1 GRAM(S): at 11:44

## 2021-10-03 RX ADMIN — FENTANYL CITRATE 1 PATCH: 50 INJECTION INTRAVENOUS at 21:03

## 2021-10-03 RX ADMIN — Medication 1 MILLIGRAM(S): at 11:44

## 2021-10-03 RX ADMIN — Medication 2: at 23:05

## 2021-10-03 RX ADMIN — Medication 1 MILLIGRAM(S): at 05:31

## 2021-10-03 RX ADMIN — Medication 1 GRAM(S): at 23:03

## 2021-10-03 RX ADMIN — HEPARIN SODIUM 5000 UNIT(S): 5000 INJECTION INTRAVENOUS; SUBCUTANEOUS at 23:04

## 2021-10-03 RX ADMIN — Medication 1 MILLIGRAM(S): at 01:44

## 2021-10-03 RX ADMIN — Medication 1 PUFF(S): at 07:28

## 2021-10-03 RX ADMIN — Medication 1 TABLET(S): at 11:44

## 2021-10-03 RX ADMIN — Medication 10 MILLIGRAM(S): at 23:04

## 2021-10-03 RX ADMIN — FENTANYL CITRATE 1 PATCH: 50 INJECTION INTRAVENOUS at 08:13

## 2021-10-03 RX ADMIN — ALBUTEROL 1 PUFF(S): 90 AEROSOL, METERED ORAL at 20:22

## 2021-10-03 RX ADMIN — Medication 1 PUFF(S): at 20:22

## 2021-10-03 RX ADMIN — Medication 1 GRAM(S): at 00:22

## 2021-10-03 RX ADMIN — Medication 2: at 17:16

## 2021-10-03 NOTE — PROGRESS NOTE ADULT - PROBLEM SELECTOR PROBLEM 5
RYAN (acute kidney injury)

## 2021-10-03 NOTE — PROGRESS NOTE ADULT - PROBLEM SELECTOR PROBLEM 1
Aspiration pneumonia

## 2021-10-03 NOTE — PROGRESS NOTE ADULT - ASSESSMENT
76 y/o female with a PMHx of DM2, pna, quadriplegia, advanced dementia, DM, CVA, GERD, HTN presents to the ED c/o recent admitted to [facility] 9/7-9/15 after being found unresponsive at nursing home, concerned about post operative distress, hypotensive signs of aspirational pna, with volume dissipated, treated with levosa, zosyn. Admitted to ICU for aspirational pna. Urinary culture proteus, which was also sensitive to Zosyn. Also appears to have paronychia of toe which she had Zyvox during hospital stay. She was found to be anemic so she was transfused of 1 L RBC, no signs of active bleeding and pt stabilized. Pt was stabilized and d/c back to rehab. Patient reported for reported respiratory distress. Pt found to be on normal vent settings, does have increased  respiratory rate. No further hx can be obtained at this time as pt is nonverbal.  sepsis - shock - chr resp failure - anemia - acute infection - pna - dysphagia - anoxia - dementia     poss DC on Monday - CM notes reviewed -     s/p ABX  HOB elev  oral hygiene  skin care  assist with ADL  full code - spoke with  - HCP  cx in progress  ABG noted  vent support  monitor VS and HD and Sat  CCM notes reviewed  vent support in progress - not a candidate for weaning  old records reviewed

## 2021-10-03 NOTE — PROGRESS NOTE ADULT - SUBJECTIVE AND OBJECTIVE BOX
BREA BECKHAM     [facility]    Allergies    codeine (Hives)    Intolerances        PAST MEDICAL & SURGICAL HISTORY:  Dementia of frontal lobe type    Aphasic stroke    Diabetes mellitus    Respiratory failure    Hypertension    GERD (gastroesophageal reflux disease)    Constipation    Respiratory failure    CVA (cerebral vascular accident)    HTN (hypertension)    DM (diabetes mellitus)    Advanced dementia    COVID-19 virus detected    Quadriplegia    Pneumonia    Type II diabetes mellitus    Hx of appendectomy    Gastrostomy in place    Tracheostomy in place    Tracheostomy tube present    Feeding by G-tube        FAMILY HISTORY:      Home Medications:  acetaminophen 160 mg/5 mL oral suspension: 20.31 milliliter(s) by gastrostomy tube every 6 hours, As Needed (23 Jun 2021 09:08)  albuterol 90 mcg/inh inhalation aerosol: 2 puff(s) inhaled every 6 hours (23 Jun 2021 09:08)  aspirin 81 mg oral tablet, chewable: 1 tab(s) by PEG tube once a day (15 Zay 2021 15:07)  Bacid (LAC) oral tablet: 2 tab(s) by gastrostomy tube once a day (23 Jun 2021 09:08)  bisacodyl 5 mg oral delayed release tablet: 1 tab(s) orally once a day (at bedtime) (23 Jun 2021 09:08)  Carafate 1 g/10 mL oral suspension: 10 milliliter(s) by gastrostomy tube 4 times a day (before meals and at bedtime) for 14 days (Started 6/4/21) (15 Zay 2021 15:07)  chlorhexidine 0.12% mucous membrane liquid: 15 milliliter(s) mucous membrane 2 times a day (15 Sep 2021 12:08)  insulin glargine: 36 unit(s) subcutaneous once a day (at bedtime) (15 Sep 2021 12:08)  ipratropium-albuterol 0.5 mg-2.5 mg/3 mL inhalation solution: 3 milliliter(s) inhaled 4 times a day (15 Zay 2021 15:07)  LORazepam 0.5 mg oral tablet: 1 tab(s) orally every 2 hours, As needed, Agitation (15 Sep 2021 12:08)  LORazepam 1 mg oral tablet: 1 tab(s) orally every 6 hours (15 Sep 2021 12:08)  methocarbamol: 250 milligram(s) by gastrostomy tube 2 times a day (15 Sep 2021 12:08)  pantoprazole 40 mg oral granule, delayed release: 40 milligram(s) by gastrostomy tube 2 times a day (15 Sep 2021 12:08)  polyethylene glycol 3350 oral powder for reconstitution: 17 gram(s) orally every 12 hours (23 Jun 2021 09:08)  senna 8.6 mg oral tablet: 1 tab(s) by gastrostomy tube once a day (at bedtime) (23 Jun 2021 09:08)  simethicone 80 mg oral tablet, chewable: 1 tab(s) orally every 6 hours (15 Sep 2021 12:08)      MEDICATIONS  (STANDING):  aspirin  chewable 81 milliGRAM(s) Oral daily  bisacodyl Suppository 10 milliGRAM(s) Rectal at bedtime  chlorhexidine 0.12% Liquid 15 milliLiter(s) Oral Mucosa every 12 hours  dextrose 40% Gel 15 Gram(s) Oral once  dextrose 5%. 1000 milliLiter(s) (50 mL/Hr) IV Continuous <Continuous>  dextrose 5%. 1000 milliLiter(s) (100 mL/Hr) IV Continuous <Continuous>  dextrose 50% Injectable 25 Gram(s) IV Push once  dextrose 50% Injectable 12.5 Gram(s) IV Push once  dextrose 50% Injectable 25 Gram(s) IV Push once  glucagon  Injectable 1 milliGRAM(s) IntraMuscular once  heparin   Injectable 5000 Unit(s) SubCutaneous every 8 hours  insulin glargine Injectable (LANTUS) 36 Unit(s) SubCutaneous at bedtime  insulin lispro (ADMELOG) corrective regimen sliding scale   SubCutaneous every 6 hours  ipratropium 17 MICROgram(s) HFA Inhaler 1 Puff(s) Inhalation every 6 hours  lactobacillus acidophilus 1 Tablet(s) Oral daily  LORazepam     Tablet 1 milliGRAM(s) Oral every 6 hours  methocarbamol 250 milliGRAM(s) Oral two times a day  pantoprazole  Injectable 40 milliGRAM(s) IV Push daily  povidone iodine 10% Solution 1 Application(s) Topical daily  senna 1 Tablet(s) Oral daily  sucralfate suspension 1 Gram(s) Enteral Tube four times a day    MEDICATIONS  (PRN):  ALBUTerol    90 MICROgram(s) HFA Inhaler 1 Puff(s) Inhalation every 6 hours PRN Shortness of Breath and/or Wheezing  LORazepam   Injectable 1 milliGRAM(s) IV Push every 6 hours PRN agtation and vent dyssynchrony  polyethylene glycol 3350 17 Gram(s) Oral every 12 hours PRN Constipation  simethicone 80 milliGRAM(s) Chew two times a day PRN Dyspepsia      Diet, NPO with Tube Feed:   Tube Feeding Modality: Gastrostomy  Jevity 1.5 Horacio  Total Volume for 24 Hours (mL): 1000  Continuous  Starting Tube Feed Rate mL per Hour: 40  Increase Tube Feed Rate by (mL): 10     Every 6 hours  Until Goal Tube Feed Rate (mL per Hour): 50  Tube Feed Duration (in Hours): 20  Tube Feed Start Time: 12:00  Tube Feed Stop Time: 08:00  Free Water Flush  Pump   Rate (mL per Hour): 25   Frequency: Every Hour    Duration (Hours): 20    Start Time: 12:00    Stop Time: 08:00  No Carb Prosource (1pkg = 15gms Protein)     Qty per Day:  2 (10-02-21 @ 13:37) [Pending Verification By Attending]  Diet, NPO with Tube Feed:   Tube Feeding Modality: Gastrostomy  Jevity 1.5 Horacio  Total Volume for 24 Hours (mL): 1000  Continuous  Starting Tube Feed Rate mL per Hour: 40  Increase Tube Feed Rate by (mL): 10     Every 6 hours  Until Goal Tube Feed Rate (mL per Hour): 50  Tube Feed Duration (in Hours): 20  Tube Feed Start Time: 12:00  Tube Feed Stop Time: 08:00 (10-02-21 @ 13:37) [Pending Verification By Attending]  Diet, NPO with Tube Feed:   Tube Feeding Modality: Gastrostomy  Jevity 1.5 Horacio  Total Volume for 24 Hours (mL): 720  Continuous  Starting Tube Feed Rate mL per Hour: 30  Until Goal Tube Feed Rate (mL per Hour): 30  Tube Feed Duration (in Hours): 24  Tube Feed Start Time: 19:00 (09-26-21 @ 18:08) [Active]          Vital Signs Last 24 Hrs  T(C): 37.7 (03 Oct 2021 08:00), Max: 37.7 (03 Oct 2021 08:00)  T(F): 99.8 (03 Oct 2021 08:00), Max: 99.8 (03 Oct 2021 08:00)  HR: 78 (03 Oct 2021 11:06) (66 - 78)  BP: 146/63 (03 Oct 2021 10:00) (98/43 - 146/63)  BP(mean): 83 (03 Oct 2021 10:00) (60 - 87)  RR: 20 (03 Oct 2021 10:00) (17 - 21)  SpO2: 100% (03 Oct 2021 11:06) (97% - 100%)      10-02-21 @ 07:01  -  10-03-21 @ 07:00  --------------------------------------------------------  IN: 1830 mL / OUT: 1500 mL / NET: 330 mL        Mode: PRVC, RR (machine): 18, TV (machine): 400, FiO2: 30, PEEP: 5, PIP: 25      LABS:                        8.4    6.86  )-----------( 382      ( 03 Oct 2021 07:22 )             28.4     10-03    141  |  107  |  11  ----------------------------<  104<H>  4.4   |  26  |  0.90    Ca    8.2<L>      03 Oct 2021 07:22    TPro  5.9<L>  /  Alb  2.0<L>  /  TBili  0.4  /  DBili  x   /  AST  14  /  ALT  19  /  AlkPhos  104  10-03              WBC:  WBC Count: 6.86 K/uL (10-03 @ 07:22)  WBC Count: 9.05 K/uL (10-01 @ 06:56)  WBC Count: 10.07 K/uL (09-30 @ 08:59)      MICROBIOLOGY:  RECENT CULTURES:  09-26 .Sputum Sputum Pseudomonas aeruginosa (Carbapenem Resistant)  Serratia marcescens  Stenotrophomonas maltophilia   Few polymorphonuclear leukocytes per low power field  Rare Squamous epithelial cells per low power field  Few Gram Negative Rods per oil power field   Moderate Pseudomonas aeruginosa (Carbapenem Resistant)  Moderate Serratia marcescens  Moderate Stenotrophomonas maltophilia  Normal Respiratory Elvira absent                    Sodium:  Sodium, Serum: 141 mmol/L (10-03 @ 07:22)  Sodium, Serum: 138 mmol/L (10-01 @ 06:56)  Sodium, Serum: 138 mmol/L (09-30 @ 08:59)      0.90 mg/dL 10-03 @ 07:22  1.02 mg/dL 10-01 @ 06:56  0.91 mg/dL 09-30 @ 08:59      Hemoglobin:  Hemoglobin: 8.4 g/dL (10-03 @ 07:22)  Hemoglobin: 8.7 g/dL (10-01 @ 06:56)  Hemoglobin: 8.3 g/dL (09-30 @ 08:59)      Platelets: Platelet Count - Automated: 382 K/uL (10-03 @ 07:22)  Platelet Count - Automated: 437 K/uL (10-01 @ 06:56)  Platelet Count - Automated: 472 K/uL (09-30 @ 08:59)      LIVER FUNCTIONS - ( 03 Oct 2021 07:22 )  Alb: 2.0 g/dL / Pro: 5.9 g/dL / ALK PHOS: 104 U/L / ALT: 19 U/L DA / AST: 14 U/L / GGT: x                 RADIOLOGY & ADDITIONAL STUDIES:      MICROBIOLOGY:  RECENT CULTURES:  09-26 .Sputum Sputum Pseudomonas aeruginosa (Carbapenem Resistant)  Serratia marcescens  Stenotrophomonas maltophilia   Few polymorphonuclear leukocytes per low power field  Rare Squamous epithelial cells per low power field  Few Gram Negative Rods per oil power field   Moderate Pseudomonas aeruginosa (Carbapenem Resistant)  Moderate Serratia marcescens  Moderate Stenotrophomonas maltophilia  Normal Respiratory Elvira absent

## 2021-10-03 NOTE — PROGRESS NOTE ADULT - PROBLEM SELECTOR PLAN 3
recurrent, culture last admission grew up proteus Mirabilis, already on Zosyn, f/u culture results.
recurrent, culture last admission grew up proteus Mirabilis, already on Zosyn, f/u culture results.    ID recs appreciated

## 2021-10-03 NOTE — PROGRESS NOTE ADULT - SUBJECTIVE AND OBJECTIVE BOX
Neurology follow up note    BREA [id number]      Interval History:    Patient resting in bed     Allergies    codeine (Hives)    Intolerances        MEDICATIONS    ALBUTerol    90 MICROgram(s) HFA Inhaler 1 Puff(s) Inhalation every 6 hours PRN  aspirin  chewable 81 milliGRAM(s) Oral daily  bisacodyl Suppository 10 milliGRAM(s) Rectal at bedtime  chlorhexidine 0.12% Liquid 15 milliLiter(s) Oral Mucosa every 12 hours  dextrose 40% Gel 15 Gram(s) Oral once  dextrose 5%. 1000 milliLiter(s) IV Continuous <Continuous>  dextrose 5%. 1000 milliLiter(s) IV Continuous <Continuous>  dextrose 50% Injectable 25 Gram(s) IV Push once  dextrose 50% Injectable 12.5 Gram(s) IV Push once  dextrose 50% Injectable 25 Gram(s) IV Push once  glucagon  Injectable 1 milliGRAM(s) IntraMuscular once  heparin   Injectable 5000 Unit(s) SubCutaneous every 8 hours  insulin glargine Injectable (LANTUS) 36 Unit(s) SubCutaneous at bedtime  insulin lispro (ADMELOG) corrective regimen sliding scale   SubCutaneous every 6 hours  ipratropium 17 MICROgram(s) HFA Inhaler 1 Puff(s) Inhalation every 6 hours  lactobacillus acidophilus 1 Tablet(s) Oral daily  LORazepam     Tablet 1 milliGRAM(s) Oral every 6 hours  LORazepam   Injectable 1 milliGRAM(s) IV Push every 6 hours PRN  methocarbamol 250 milliGRAM(s) Oral two times a day  pantoprazole  Injectable 40 milliGRAM(s) IV Push daily  polyethylene glycol 3350 17 Gram(s) Oral every 12 hours PRN  povidone iodine 10% Solution 1 Application(s) Topical daily  senna 1 Tablet(s) Oral daily  simethicone 80 milliGRAM(s) Chew two times a day PRN  sucralfate suspension 1 Gram(s) Enteral Tube four times a day              Vital Signs Last 24 Hrs  T(C): 36.8 (03 Oct 2021 04:04), Max: 37.2 (02 Oct 2021 11:30)  T(F): 98.3 (03 Oct 2021 04:04), Max: 99 (02 Oct 2021 11:30)  HR: 69 (03 Oct 2021 08:00) (66 - 76)  BP: 109/53 (03 Oct 2021 08:00) (98/43 - 126/49)  BP(mean): 70 (03 Oct 2021 08:00) (60 - 87)  RR: 17 (03 Oct 2021 08:00) (17 - 24)  SpO2: 100% (03 Oct 2021 08:00) (98% - 100%)      REVIEW OF SYSTEMS:  Could not be obtained secondary to the patient being nonverbal.  As per my conversation with the spouse, a gradual process along with underlying strokes causing her to become bed-bound.    PHYSICAL EXAMINATION:    Head:  Normocephalic, atraumatic.  Eyes:  No scleral icterus.  Ears:  Cannot be evaluated secondary to the patient being nonverbal.  NECK:  Tracheostomy was in place.  RESPIRATORY:  Good air entry bilaterally.  CARDIOVASCULAR:  S1 and S2 heard.  ABDOMEN:  Soft and nontender.  EXTREMITIES:  No clubbing or cyanosis were noted.      NEUROLOGIC:  The patient was awake in bed.  Upon stimulating the patient, her eyes did roll up.  Her body started to stiffen with abnormal mouth movements, lasted one to two minutes.  Pupils bilaterally were 3 mm, reactive to 2 mm.  The patient has her arms in a flexed position with both hands contracted.  Bilateral lower extremities were in a straight position.  The patient has increased tone, bilateral upper and lower extremities.                   LABS:  CBC Full  -  ( 03 Oct 2021 07:22 )  WBC Count : 6.86 K/uL  RBC Count : 3.09 M/uL  Hemoglobin : 8.4 g/dL  Hematocrit : 28.4 %  Platelet Count - Automated : 382 K/uL  Mean Cell Volume : 91.9 fl  Mean Cell Hemoglobin : 27.2 pg  Mean Cell Hemoglobin Concentration : 29.6 gm/dL  Auto Neutrophil # : x  Auto Lymphocyte # : x  Auto Monocyte # : x  Auto Eosinophil # : x  Auto Basophil # : x  Auto Neutrophil % : x  Auto Lymphocyte % : x  Auto Monocyte % : x  Auto Eosinophil % : x  Auto Basophil % : x      10-03    141  |  107  |  11  ----------------------------<  104<H>  4.4   |  26  |  0.90    Ca    8.2<L>      03 Oct 2021 07:22    TPro  5.9<L>  /  Alb  2.0<L>  /  TBili  0.4  /  DBili  x   /  AST  14  /  ALT  19  /  AlkPhos  104  10-03    Hemoglobin A1C:     LIVER FUNCTIONS - ( 03 Oct 2021 07:22 )  Alb: 2.0 g/dL / Pro: 5.9 g/dL / ALK PHOS: 104 U/L / ALT: 19 U/L DA / AST: 14 U/L / GGT: x           Vitamin B12         RADIOLOGY      ANALYSIS AND PLAN:  This is a 75-year-old with an episode of cardiac arrest and abnormal movements.  For episodes of cardiac arrest, continue to monitor heart rate on telemetry.  If possible, please maintain a systolic blood pressure greater than 100, to help maintain cerebral perfusion.  For history of abnormal movements, as per my conversation with the spouse, this is a known condition that happens to her, as per him with GI-related issues.  She will start to hyperventilate, eyes will roll up, will have abnormal mouth movement, and tried to move from side-to-side.  She has undergone extensive EEG monitoring and has captured these events on numerous occasions and deemed to be non-epileptiform.  For now, I would recommend supportive therapy.  No antiepileptic medications.  For history of diabetes, strict control of blood sugars.  For history of cerebrovascular accident, continue the patient on her home medications.  spoke to spouse agreeable to try muscle relaxant     The spouse's name is Marciano.  His telephone number is [phone] 9/11.  I had a lengthy discussion, we will not be starting any antiepileptic medications.    Greater than 40 minutes of time was spent with the patient, plan of care, reviewing data, speaking to the multidisciplinary healthcare team with greater than 50% of that time in counseling and care coordination.

## 2021-10-03 NOTE — PROGRESS NOTE ADULT - COMMENTS
unable to obtain due to underlying medical condition.

## 2021-10-03 NOTE — PROGRESS NOTE ADULT - PROBLEM SELECTOR PLAN 1
recurrent, versus vent associated PNA, start on aspiration precautions, patient was started on Zosyn   vanco dc'ed.    ID recs appreciated  Continue on pip-tazo (started late 9/25)   sputum cultue peudomonas and serratia , d/w ID continue present management.  Discharge plan. recurrent, versus vent associated PNA, start on aspiration precautions, patient was started on Zosyn    ID recs appreciated  Continue on pip-tazo (started late 9/25)   sputum cultue peudomonas and serratia , d/w ID continue present management.  Discharge plan.

## 2021-10-03 NOTE — PROGRESS NOTE ADULT - PROBLEM SELECTOR PLAN 2
as per .
ML related to the above, and sepsis, responded well to suction, currently sating 100% on FIO2 of 0.6, titrate down,, Seen by Dr. Jaime quintanilla appreciated.
ML related to the above, and sepsis, responded well to suction, currently sating 100% on FIO2 of 0.6, titrate down,, Seen by Dr. Jaime quintanilla appreciated.
as per .
ML related to the above, and sepsis, responded well to suction, currently sating 100% on FIO2 of 0.6, titrate down,, Seen by Dr. Jaime quintanilla appreciated.
as per .

## 2021-10-03 NOTE — PROGRESS NOTE ADULT - ASSESSMENT
CHAPINCITO GUIDRY 75 [address] [facility] S 9/24/2021   DR ILIANA KEMP     REVIEW OF SYMPTOMS      Able to give (reliable) ROS  NO     PHYSICAL EXAM    HEENT Unremarkable  atraumatic   RESP Fair air entry EXP prolonged    Harsh breath sound Resp distres mild   CARDIAC S1 S2 No S3     NO JVD    ABDOMEN SOFT BS PRESENT NOT DISTENDED No hepatosplenomegaly   PEDAL EDEMA present No calf tenderness  NO rash       PROBLEMS.        VAP poa 9/25  UTI poa 9/25  VDRF ([facility])  PEG ([facility])  ANOXIC ENCEPH ([facility])     WORSENING CXR 9/27/2021   ANEMIA 9/29/2021 Hb 7.5      EVENTS.    10/3/2021 w 6.8 Hb 8.4 Cr .9   10/3/2021 off abio     BEST PRACTICE ISSUES.                                                  HEAD OF BED ELEVATION. Yes  DVT PROPHYLAXIS.   hpsc 9/26/2021                                       SQUIRES PROPHYLAXIS.  carafate (9/25)                                                                                         DIET.            jevity 1.5 30/h 9/26/2021 gt               INFECTION PROPHYLAXIS.   chlorhexidine .12% 9/26/2021                      GENERAL ISSUES  GOC ADVANCED DIRECTIVE.                                         ALLERGY.  codeine                            WEIGHT.      9/25/2021 61                              BMI.                 9/25/2021 22       PATIENT DATA   TUBES  PROCEDURES.     poa Trach  poa peg    ABG.   9/26 7p 40%/vent 743/39/76    OXYGENATION.                   VITALS/IO/VENT/DRIPS.    10/3/2021 85 130/50   10/3/2021 ac 18/400/5/30    PROBLEM ASSESSMENT/RECOMMENDATIONS.  COVID STATUS.  scv2 9/25/2021 (-)  spk ab 9/26/2021 (+) 250   RESP.  VDRF.   Lung protective ventilation  target po 90-95%  clinically stable     INFECTION.  VAP.   UTI.  W 9/25-9/26-9/27/2021 w 13-9.6-7.8   pr 9/26/2021 pr 2.1   ua 9/25/2021 w 26-50 l estrase mod   9/27/2021 cxr bl perihilar and lower lobe consolidations progressive   eventration r diaphr   ct cap 9/25/2021 dense bl cpnsolidations new mediastinal lne Small bl effs dilated fluid filled rectum   mrsa 9/27 (-)   9/26 sp   1) stenotrophomonas   2) carbapenem resistant pseucomonas   3) serratia  urine c 9/25 uc 100k proteus   zosyn 9/25 Abio course complketd  9/30     COPD.  prov p (9/25)  atrov (9/25)  under control   ANEMIA.  Hb 9/25-9/26-9/27-9/29-10/3/2021 hb 10 - 9.1-8.4-7.5  - 8.4    ASSESSMENT RECOMMENDATIONS .    VDRF anoxic encephalopathy patient  admitted 9/25/2021 with VAP UTI resp distress started on completd bsab  dc planning      TIME SPENT   Over 36 minutes aggregate critical care time spent on encounter; activities included   direct patient care, counseling and/or coordinating care reviewing notes, lab data/ imaging , discussion with multidisciplinary team/ patient  /family and explaining in detail risks, benefits, alternatives  of the recommendations     CHAPINCITO GUIDRY 75 [address] [facility] S 9/24/2021   DR ILIANA KEMP

## 2021-10-03 NOTE — PROGRESS NOTE ADULT - PROBLEM SELECTOR PLAN 8
supportive care, specialty bed with low air loss mattress, wound care team consult.

## 2021-10-03 NOTE — PROGRESS NOTE ADULT - SUBJECTIVE AND OBJECTIVE BOX
Patient is a 75y old  Female who presents with a chief complaint of Respiratory distress. (03 Oct 2021 09:39)      INTERVAL HPI/OVERNIGHT EVENTS:  Pt is seen and examined.  unable to obtaned due to underlying medical condition.      Pain Location & Control:     MEDICATIONS  (STANDING):  aspirin  chewable 81 milliGRAM(s) Oral daily  bisacodyl Suppository 10 milliGRAM(s) Rectal at bedtime  chlorhexidine 0.12% Liquid 15 milliLiter(s) Oral Mucosa every 12 hours  dextrose 40% Gel 15 Gram(s) Oral once  dextrose 5%. 1000 milliLiter(s) (50 mL/Hr) IV Continuous <Continuous>  dextrose 5%. 1000 milliLiter(s) (100 mL/Hr) IV Continuous <Continuous>  dextrose 50% Injectable 25 Gram(s) IV Push once  dextrose 50% Injectable 12.5 Gram(s) IV Push once  dextrose 50% Injectable 25 Gram(s) IV Push once  glucagon  Injectable 1 milliGRAM(s) IntraMuscular once  heparin   Injectable 5000 Unit(s) SubCutaneous every 8 hours  insulin glargine Injectable (LANTUS) 36 Unit(s) SubCutaneous at bedtime  insulin lispro (ADMELOG) corrective regimen sliding scale   SubCutaneous every 6 hours  ipratropium 17 MICROgram(s) HFA Inhaler 1 Puff(s) Inhalation every 6 hours  lactobacillus acidophilus 1 Tablet(s) Oral daily  LORazepam     Tablet 1 milliGRAM(s) Oral every 6 hours  methocarbamol 250 milliGRAM(s) Oral two times a day  pantoprazole  Injectable 40 milliGRAM(s) IV Push daily  povidone iodine 10% Solution 1 Application(s) Topical daily  senna 1 Tablet(s) Oral daily  sucralfate suspension 1 Gram(s) Enteral Tube four times a day    MEDICATIONS  (PRN):  ALBUTerol    90 MICROgram(s) HFA Inhaler 1 Puff(s) Inhalation every 6 hours PRN Shortness of Breath and/or Wheezing  LORazepam   Injectable 1 milliGRAM(s) IV Push every 6 hours PRN agtation and vent dyssynchrony  polyethylene glycol 3350 17 Gram(s) Oral every 12 hours PRN Constipation  simethicone 80 milliGRAM(s) Chew two times a day PRN Dyspepsia      Allergies    codeine (Hives)    Intolerances      Vital Signs Last 24 Hrs  T(C): 37.7 (03 Oct 2021 08:00), Max: 37.7 (03 Oct 2021 08:00)  T(F): 99.8 (03 Oct 2021 08:00), Max: 99.8 (03 Oct 2021 08:00)  HR: 78 (03 Oct 2021 10:00) (66 - 78)  BP: 146/63 (03 Oct 2021 10:00) (98/43 - 146/63)  BP(mean): 83 (03 Oct 2021 10:00) (60 - 87)  RR: 20 (03 Oct 2021 10:00) (17 - 21)  SpO2: 97% (03 Oct 2021 10:00) (97% - 100%)        LABS:                        8.4    6.86  )-----------( 382      ( 03 Oct 2021 07:22 )             28.4     03 Oct 2021 07:22    141    |  107    |  11     ----------------------------<  104    4.4     |  26     |  0.90     Ca    8.2        03 Oct 2021 07:22    TPro  5.9    /  Alb  2.0    /  TBili  0.4    /  DBili  x      /  AST  14     /  ALT  19     /  AlkPhos  104    03 Oct 2021 07:22        CAPILLARY BLOOD GLUCOSE      POCT Blood Glucose.: 113 mg/dL (03 Oct 2021 05:28)  POCT Blood Glucose.: 145 mg/dL (03 Oct 2021 00:21)  POCT Blood Glucose.: 144 mg/dL (02 Oct 2021 22:15)  POCT Blood Glucose.: 139 mg/dL (02 Oct 2021 18:10)  POCT Blood Glucose.: 131 mg/dL (02 Oct 2021 11:14)        Cultures  Culture Results:   Moderate Pseudomonas aeruginosa (Carbapenem Resistant)  Moderate Serratia marcescens  Moderate Stenotrophomonas maltophilia  Normal Respiratory Elvira absent (09-26 @ 12:50)        Culture - Sputum (collected 09-26-21 @ 12:50)  Source: .Sputum Sputum  Gram Stain (09-26-21 @ 15:43):    Few polymorphonuclear leukocytes per low power field    Rare Squamous epithelial cells per low power field    Few Gram Negative Rods per oil power field  Final Report (09-28-21 @ 14:54):    Moderate Pseudomonas aeruginosa (Carbapenem Resistant)    Moderate Serratia marcescens    Moderate Stenotrophomonas maltophilia    Normal Respiratory Elvira absent  Organism: Pseudomonas aeruginosa (Carbapenem Resistant)  Serratia marcescens  Stenotrophomonas maltophilia (09-28-21 @ 14:54)  Organism: Stenotrophomonas maltophilia (09-28-21 @ 14:54)      -  Ceftazidime: R >16      -  Levofloxacin: S 1      -  Trimethoprim/Sulfamethoxazole: S <=0.5/9.5      Method Type: PRATIK  Organism: Serratia marcescens (09-28-21 @ 14:54)      -  Amikacin: S <=16      -  Amoxicillin/Clavulanic Acid: R >16/8      -  Ampicillin: R >16 These ampicillin results predict results for amoxicillin      -  Ampicillin/Sulbactam: R >16/8 Enterobacter, Citrobacter, and Serratia may develop resistance during prolonged therapy (3-4 days)      -  Aztreonam: S <=4      -  Cefazolin: R >16 Enterobacter, Citrobacter, and Serratia may develop resistance during prolonged therapy (3-4 days)      -  Cefepime: S <=2      -  Cefoxitin: R <=8      -  Ceftriaxone: I 2 Enterobacter, Citrobacter, and Serratia may develop resistance during prolonged therapy      -  Ciprofloxacin: R >2      -  Ertapenem: S <=0.5      -  Gentamicin: S <=2      -  Levofloxacin: R 2      -  Meropenem: S <=1      -  Piperacillin/Tazobactam: S <=8      -  Tobramycin: S <=2      -  Trimethoprim/Sulfamethoxazole: S <=0.5/9.5      Method Type: PRATIK  Organism: Pseudomonas aeruginosa (Carbapenem Resistant) (09-28-21 @ 14:54)      -  Amikacin: S <=16      -  Aztreonam: S <=4      -  Cefepime: S 4      -  Ceftazidime: S 8      -  Ciprofloxacin: S <=0.25      -  Gentamicin: S <=2      -  Imipenem: R >8      -  Levofloxacin: S <=0.5      -  Meropenem: I 4      -  Piperacillin/Tazobactam: S <=8      -  Tobramycin: S <=2      Method Type: PRATIK        RADIOLOGY & ADDITIONAL TESTS:    Imaging Personally Reviewed:  [ ] YES  [ ] NO    Consultant(s) Notes Reviewed:  [ ] YES  [ ] NO    Care Discussed with Consultants/Other Providers [ x] YES  [ ] NO

## 2021-10-03 NOTE — PROGRESS NOTE ADULT - SUBJECTIVE AND OBJECTIVE BOX
Chief Complaint: Respiratory distress    Interval Events: No events overnight.    Review of Systems:  Unable to obtain    Physical Exam:  Vital Signs Last 24 Hrs  T(C): 36.8 (03 Oct 2021 04:04), Max: 37.2 (02 Oct 2021 11:30)  T(F): 98.3 (03 Oct 2021 04:04), Max: 99 (02 Oct 2021 11:30)  HR: 69 (03 Oct 2021 08:00) (66 - 76)  BP: 109/53 (03 Oct 2021 08:00) (98/43 - 126/49)  BP(mean): 70 (03 Oct 2021 08:00) (60 - 87)  RR: 17 (03 Oct 2021 08:00) (17 - 24)  SpO2: 100% (03 Oct 2021 08:00) (98% - 100%)  General: Chronically ill appearing  HEENT: Trach  Neck: No JVD, no carotid bruit  Lungs: Coarse bilaterally  CV: RRR, nl S1/S2, no M/R/G  Abdomen: S/NT/ND, +BS  Extremities: No LE edema, no cyanosis  Neuro: AAOx0  Skin: No rash    Labs:    10-03    141  |  107  |  11  ----------------------------<  104<H>  4.4   |  26  |  0.90    Ca    8.2<L>      03 Oct 2021 07:22    TPro  5.9<L>  /  Alb  2.0<L>  /  TBili  0.4  /  DBili  x   /  AST  14  /  ALT  19  /  AlkPhos  104  10-03                        8.4    6.86  )-----------( 382      ( 03 Oct 2021 07:22 )             28.4       Telemetry: Sinus rhythm

## 2021-10-03 NOTE — PROGRESS NOTE ADULT - ENMT
No oral lesions; no gross abnormalities
detailed exam

## 2021-10-03 NOTE — PROGRESS NOTE ADULT - PROBLEM SELECTOR PROBLEM 8
Functional quadriplegia

## 2021-10-03 NOTE — PROGRESS NOTE ADULT - PROBLEM SELECTOR PROBLEM 2
Acute on chronic respiratory failure with hypoxia

## 2021-10-03 NOTE — PROGRESS NOTE ADULT - GASTROINTESTINAL DETAILS
soft/no distention/bowel sounds normal
soft/no distention/bowel sounds normal
soft/bowel sounds normal
soft/no distention/bowel sounds normal
soft/no distention/bowel sounds normal

## 2021-10-03 NOTE — PROGRESS NOTE ADULT - PROBLEM SELECTOR PLAN 6
disproportionate to RYAN severity, check for EKG findings, pending, possible lab error, on tele monitor, repeat magnesium level in am were trending down.  Will continue to monitor.
resolved.
disproportionate to RYAN severity, check for EKG findings, pending, possible lab error, on tele monitor, repeat magnesium level in am were trending down.  Will continue to monitor.
disproportionate to RYAN severity, check for EKG findings, pending, possible lab error, on tele monitor, repeat magnesium level in am were trending down.  Will continue to monitor.
resolved.
resolved.

## 2021-10-03 NOTE — PROGRESS NOTE ADULT - MENTAL STATUS
unable to obtained due to underlying medical condition.
unable to examine due to AMS.
Unable to examine due to underlying medical condition.
Unable to obtain due to underlying medical condition.
Unable to examine due to underlying medical condition.

## 2021-10-03 NOTE — PROGRESS NOTE ADULT - PROBLEM SELECTOR PLAN 5
resolved.
resolved.
ML related to the above, IVF management, avoid nephrotoxic meds as possible, monitor Vancomycin trough level closely if continued by ID, monitor I & o AND BUN/Cr.    Pt having good urine output. will dc vaughn.
resolved.
ML related to the above, IVF management, avoid nephrotoxic meds as possible, monitor Vancomycin trough level closely if continued by ID, monitor I & o AND BUN/Cr.    Pt having good urine output. johana was dc'ed
ML related to the above, IVF management, avoid nephrotoxic meds as possible, monitor Vancomycin trough level closely if continued by ID, monitor I & o AND BUN/Cr.    Pt having good urine output. will dc vaughn.
resolved.
resolved.

## 2021-10-03 NOTE — PROGRESS NOTE ADULT - PROBLEM SELECTOR PLAN 7
uncontrolled, continue on Lantus insulin 36 units at bedtime in addition to corrective insulin Lispro scale coverage before Q 6h, check glycohemoglobin level in am. uncontrolled, continue on Lantus insulin 36 units at bedtime in addition to corrective insulin Lispro scale coverage before Q 6h,

## 2021-10-03 NOTE — PROGRESS NOTE ADULT - TIME BILLING
Currently full code
GOC should be discussed.  Currently full code
Currently full code.    Case d/w RN/ID/E icu.
Currently full code.    Case d/w RN/ID.  Plan of care was discuss with patient's spouse Marciano, allquestions were answered, seems understand and in agreement.
Currently full code.    Case d/w RN/ID.
Currently full code.    Case d/w RN/ID.
GOC should be discussed.  Currently full code
Currently full code.    Case d/w RN/ID.

## 2021-10-03 NOTE — PROGRESS NOTE ADULT - ASSESSMENT
The patient is a 75 year old female with a history of HTN, DM, CVA, dementia, chronic respiratory failure s/p trach, PEG, recent cardiac arrest who presents with acute on chronic respiratory failure.     Plan:  - CXR and CT chest with bilateral infiltrates, left greater than right, and bilateral pleural effusions  - Echo last admission with normal LV systolic function, mild pulm HTN  - Aspirin on hold due to prior anemia and bleed  - Completed zosyn  - Mechanical ventilation  - Overall poor prognosis - comfort care would be most appropriate

## 2021-10-03 NOTE — PROGRESS NOTE ADULT - SUBJECTIVE AND OBJECTIVE BOX
Date/Time Patient Seen:  		  Referring MD:   Data Reviewed	       Patient is a 75y old  Female who presents with a chief complaint of Respiratory distress. (02 Oct 2021 21:10)      Subjective/HPI     PAST MEDICAL & SURGICAL HISTORY:  Dementia of frontal lobe type    Aphasic stroke    Diabetes mellitus    Respiratory failure    Hypertension    GERD (gastroesophageal reflux disease)    Constipation    Respiratory failure    CVA (cerebral vascular accident)    HTN (hypertension)    DM (diabetes mellitus)    Advanced dementia    COVID-19 virus detected    Quadriplegia    Pneumonia    Type II diabetes mellitus    Hx of appendectomy    Gastrostomy in place    Tracheostomy in place    Tracheostomy tube present    Feeding by G-tube          Medication list         MEDICATIONS  (STANDING):  aspirin  chewable 81 milliGRAM(s) Oral daily  bisacodyl Suppository 10 milliGRAM(s) Rectal at bedtime  chlorhexidine 0.12% Liquid 15 milliLiter(s) Oral Mucosa every 12 hours  dextrose 40% Gel 15 Gram(s) Oral once  dextrose 5%. 1000 milliLiter(s) (50 mL/Hr) IV Continuous <Continuous>  dextrose 5%. 1000 milliLiter(s) (100 mL/Hr) IV Continuous <Continuous>  dextrose 50% Injectable 25 Gram(s) IV Push once  dextrose 50% Injectable 12.5 Gram(s) IV Push once  dextrose 50% Injectable 25 Gram(s) IV Push once  glucagon  Injectable 1 milliGRAM(s) IntraMuscular once  heparin   Injectable 5000 Unit(s) SubCutaneous every 8 hours  insulin glargine Injectable (LANTUS) 36 Unit(s) SubCutaneous at bedtime  insulin lispro (ADMELOG) corrective regimen sliding scale   SubCutaneous every 6 hours  ipratropium 17 MICROgram(s) HFA Inhaler 1 Puff(s) Inhalation every 6 hours  lactobacillus acidophilus 1 Tablet(s) Oral daily  LORazepam     Tablet 1 milliGRAM(s) Oral every 6 hours  methocarbamol 250 milliGRAM(s) Oral two times a day  pantoprazole  Injectable 40 milliGRAM(s) IV Push daily  povidone iodine 10% Solution 1 Application(s) Topical daily  senna 1 Tablet(s) Oral daily  sucralfate suspension 1 Gram(s) Enteral Tube four times a day    MEDICATIONS  (PRN):  ALBUTerol    90 MICROgram(s) HFA Inhaler 1 Puff(s) Inhalation every 6 hours PRN Shortness of Breath and/or Wheezing  LORazepam   Injectable 1 milliGRAM(s) IV Push every 6 hours PRN agtation and vent dyssynchrony  polyethylene glycol 3350 17 Gram(s) Oral every 12 hours PRN Constipation  simethicone 80 milliGRAM(s) Chew two times a day PRN Dyspepsia         Vitals log        ICU Vital Signs Last 24 Hrs  T(C): 36.8 (03 Oct 2021 04:04), Max: 37.2 (02 Oct 2021 11:30)  T(F): 98.3 (03 Oct 2021 04:04), Max: 99 (02 Oct 2021 11:30)  HR: 76 (03 Oct 2021 06:00) (66 - 76)  BP: 121/72 (03 Oct 2021 06:00) (98/43 - 126/49)  BP(mean): 85 (03 Oct 2021 06:00) (60 - 87)  ABP: --  ABP(mean): --  RR: 21 (03 Oct 2021 06:00) (18 - 24)  SpO2: 100% (03 Oct 2021 06:00) (95% - 100%)       Mode: prvc  RR (machine): 18  TV (machine): 400  FiO2: 30  PEEP: 5  ITime: 1.15  MAP: 18  P-High:   P-Low:   PIP: 30      Input and Output:  I&O's Detail    01 Oct 2021 07:01  -  02 Oct 2021 07:00  --------------------------------------------------------  IN:    Enteral Tube Flush: 201 mL    Free Water: 850 mL    IV PiggyBack: 200 mL    Jevity 1.5: 720 mL    Other (mL): 400 mL  Total IN: 2371 mL    OUT:    Voided (mL): 600 mL  Total OUT: 600 mL    Total NET: 1771 mL      02 Oct 2021 07:01  -  03 Oct 2021 06:44  --------------------------------------------------------  IN:    Enteral Tube Flush: 240 mL    Free Water: 600 mL    IV PiggyBack: 100 mL    Jevity 1.5: 630 mL  Total IN: 1570 mL    OUT:    Voided (mL): 1500 mL  Total OUT: 1500 mL    Total NET: 70 mL          Lab Data                        8.7    9.05  )-----------( 437      ( 01 Oct 2021 06:56 )             29.6     10-01    138  |  106  |  10  ----------------------------<  155<H>  4.0   |  26  |  1.02    Ca    8.7      01 Oct 2021 06:56              Review of Systems	      Objective     Physical Examination        Pertinent Lab findings & Imaging      Annalise:  NO   Adequate UO     I&O's Detail    01 Oct 2021 07:01  -  02 Oct 2021 07:00  --------------------------------------------------------  IN:    Enteral Tube Flush: 201 mL    Free Water: 850 mL    IV PiggyBack: 200 mL    Jevity 1.5: 720 mL    Other (mL): 400 mL  Total IN: 2371 mL    OUT:    Voided (mL): 600 mL  Total OUT: 600 mL    Total NET: 1771 mL      02 Oct 2021 07:01  -  03 Oct 2021 06:44  --------------------------------------------------------  IN:    Enteral Tube Flush: 240 mL    Free Water: 600 mL    IV PiggyBack: 100 mL    Jevity 1.5: 630 mL  Total IN: 1570 mL    OUT:    Voided (mL): 1500 mL  Total OUT: 1500 mL    Total NET: 70 mL               Discussed with:     Cultures:	        Radiology                            Date/Time Patient Seen:  		  Referring MD:   Data Reviewed	       Patient is a 75y old  Female who presents with a chief complaint of Respiratory distress. (02 Oct 2021 21:10)      Subjective/HPI     PAST MEDICAL & SURGICAL HISTORY:  Dementia of frontal lobe type    Aphasic stroke    Diabetes mellitus    Respiratory failure    Hypertension    GERD (gastroesophageal reflux disease)    Constipation    Respiratory failure    CVA (cerebral vascular accident)    HTN (hypertension)    DM (diabetes mellitus)    Advanced dementia    COVID-19 virus detected    Quadriplegia    Pneumonia    Type II diabetes mellitus    Hx of appendectomy    Gastrostomy in place    Tracheostomy in place    Tracheostomy tube present    Feeding by G-tube          Medication list         MEDICATIONS  (STANDING):  aspirin  chewable 81 milliGRAM(s) Oral daily  bisacodyl Suppository 10 milliGRAM(s) Rectal at bedtime  chlorhexidine 0.12% Liquid 15 milliLiter(s) Oral Mucosa every 12 hours  dextrose 40% Gel 15 Gram(s) Oral once  dextrose 5%. 1000 milliLiter(s) (50 mL/Hr) IV Continuous <Continuous>  dextrose 5%. 1000 milliLiter(s) (100 mL/Hr) IV Continuous <Continuous>  dextrose 50% Injectable 25 Gram(s) IV Push once  dextrose 50% Injectable 12.5 Gram(s) IV Push once  dextrose 50% Injectable 25 Gram(s) IV Push once  glucagon  Injectable 1 milliGRAM(s) IntraMuscular once  heparin   Injectable 5000 Unit(s) SubCutaneous every 8 hours  insulin glargine Injectable (LANTUS) 36 Unit(s) SubCutaneous at bedtime  insulin lispro (ADMELOG) corrective regimen sliding scale   SubCutaneous every 6 hours  ipratropium 17 MICROgram(s) HFA Inhaler 1 Puff(s) Inhalation every 6 hours  lactobacillus acidophilus 1 Tablet(s) Oral daily  LORazepam     Tablet 1 milliGRAM(s) Oral every 6 hours  methocarbamol 250 milliGRAM(s) Oral two times a day  pantoprazole  Injectable 40 milliGRAM(s) IV Push daily  povidone iodine 10% Solution 1 Application(s) Topical daily  senna 1 Tablet(s) Oral daily  sucralfate suspension 1 Gram(s) Enteral Tube four times a day    MEDICATIONS  (PRN):  ALBUTerol    90 MICROgram(s) HFA Inhaler 1 Puff(s) Inhalation every 6 hours PRN Shortness of Breath and/or Wheezing  LORazepam   Injectable 1 milliGRAM(s) IV Push every 6 hours PRN agtation and vent dyssynchrony  polyethylene glycol 3350 17 Gram(s) Oral every 12 hours PRN Constipation  simethicone 80 milliGRAM(s) Chew two times a day PRN Dyspepsia         Vitals log        ICU Vital Signs Last 24 Hrs  T(C): 36.8 (03 Oct 2021 04:04), Max: 37.2 (02 Oct 2021 11:30)  T(F): 98.3 (03 Oct 2021 04:04), Max: 99 (02 Oct 2021 11:30)  HR: 76 (03 Oct 2021 06:00) (66 - 76)  BP: 121/72 (03 Oct 2021 06:00) (98/43 - 126/49)  BP(mean): 85 (03 Oct 2021 06:00) (60 - 87)  ABP: --  ABP(mean): --  RR: 21 (03 Oct 2021 06:00) (18 - 24)  SpO2: 100% (03 Oct 2021 06:00) (95% - 100%)       Mode: prvc  RR (machine): 18  TV (machine): 400  FiO2: 30  PEEP: 5  ITime: 1.15  MAP: 18  P-High:   P-Low:   PIP: 30      Input and Output:  I&O's Detail    01 Oct 2021 07:01  -  02 Oct 2021 07:00  --------------------------------------------------------  IN:    Enteral Tube Flush: 201 mL    Free Water: 850 mL    IV PiggyBack: 200 mL    Jevity 1.5: 720 mL    Other (mL): 400 mL  Total IN: 2371 mL    OUT:    Voided (mL): 600 mL  Total OUT: 600 mL    Total NET: 1771 mL      02 Oct 2021 07:01  -  03 Oct 2021 06:44  --------------------------------------------------------  IN:    Enteral Tube Flush: 240 mL    Free Water: 600 mL    IV PiggyBack: 100 mL    Jevity 1.5: 630 mL  Total IN: 1570 mL    OUT:    Voided (mL): 1500 mL  Total OUT: 1500 mL    Total NET: 70 mL          Lab Data                        8.7    9.05  )-----------( 437      ( 01 Oct 2021 06:56 )             29.6     10-01    138  |  106  |  10  ----------------------------<  155<H>  4.0   |  26  |  1.02    Ca    8.7      01 Oct 2021 06:56              Review of Systems	      Objective     Physical Examination    heart s1s2  lung dec BS  abd soft  trach  peg      Pertinent Lab findings & Imaging      Annalise:  NO   Adequate UO     I&O's Detail    01 Oct 2021 07:01  -  02 Oct 2021 07:00  --------------------------------------------------------  IN:    Enteral Tube Flush: 201 mL    Free Water: 850 mL    IV PiggyBack: 200 mL    Jevity 1.5: 720 mL    Other (mL): 400 mL  Total IN: 2371 mL    OUT:    Voided (mL): 600 mL  Total OUT: 600 mL    Total NET: 1771 mL      02 Oct 2021 07:01  -  03 Oct 2021 06:44  --------------------------------------------------------  IN:    Enteral Tube Flush: 240 mL    Free Water: 600 mL    IV PiggyBack: 100 mL    Jevity 1.5: 630 mL  Total IN: 1570 mL    OUT:    Voided (mL): 1500 mL  Total OUT: 1500 mL    Total NET: 70 mL               Discussed with:     Cultures:	        Radiology

## 2021-10-04 ENCOUNTER — TRANSCRIPTION ENCOUNTER (OUTPATIENT)
Age: 76
End: 2021-10-04

## 2021-10-04 VITALS
RESPIRATION RATE: 18 BRPM | OXYGEN SATURATION: 100 % | DIASTOLIC BLOOD PRESSURE: 49 MMHG | SYSTOLIC BLOOD PRESSURE: 101 MMHG | HEART RATE: 66 BPM

## 2021-10-04 LAB — SARS-COV-2 RNA SPEC QL NAA+PROBE: SIGNIFICANT CHANGE UP

## 2021-10-04 PROCEDURE — 99291 CRITICAL CARE FIRST HOUR: CPT

## 2021-10-04 PROCEDURE — 84100 ASSAY OF PHOSPHORUS: CPT

## 2021-10-04 PROCEDURE — 87040 BLOOD CULTURE FOR BACTERIA: CPT

## 2021-10-04 PROCEDURE — 87070 CULTURE OTHR SPECIMN AEROBIC: CPT

## 2021-10-04 PROCEDURE — 87086 URINE CULTURE/COLONY COUNT: CPT

## 2021-10-04 PROCEDURE — 85027 COMPLETE CBC AUTOMATED: CPT

## 2021-10-04 PROCEDURE — 87077 CULTURE AEROBIC IDENTIFY: CPT

## 2021-10-04 PROCEDURE — 36600 WITHDRAWAL OF ARTERIAL BLOOD: CPT

## 2021-10-04 PROCEDURE — 94003 VENT MGMT INPAT SUBQ DAY: CPT

## 2021-10-04 PROCEDURE — 96375 TX/PRO/DX INJ NEW DRUG ADDON: CPT

## 2021-10-04 PROCEDURE — 81001 URINALYSIS AUTO W/SCOPE: CPT

## 2021-10-04 PROCEDURE — 82272 OCCULT BLD FECES 1-3 TESTS: CPT

## 2021-10-04 PROCEDURE — U0005: CPT

## 2021-10-04 PROCEDURE — 87449 NOS EACH ORGANISM AG IA: CPT

## 2021-10-04 PROCEDURE — 86769 SARS-COV-2 COVID-19 ANTIBODY: CPT

## 2021-10-04 PROCEDURE — 85014 HEMATOCRIT: CPT

## 2021-10-04 PROCEDURE — 96376 TX/PRO/DX INJ SAME DRUG ADON: CPT

## 2021-10-04 PROCEDURE — 85730 THROMBOPLASTIN TIME PARTIAL: CPT

## 2021-10-04 PROCEDURE — 87640 STAPH A DNA AMP PROBE: CPT

## 2021-10-04 PROCEDURE — 84145 PROCALCITONIN (PCT): CPT

## 2021-10-04 PROCEDURE — 87186 SC STD MICRODIL/AGAR DIL: CPT

## 2021-10-04 PROCEDURE — 80048 BASIC METABOLIC PNL TOTAL CA: CPT

## 2021-10-04 PROCEDURE — 80053 COMPREHEN METABOLIC PANEL: CPT

## 2021-10-04 PROCEDURE — 82803 BLOOD GASES ANY COMBINATION: CPT

## 2021-10-04 PROCEDURE — 74176 CT ABD & PELVIS W/O CONTRAST: CPT

## 2021-10-04 PROCEDURE — 94002 VENT MGMT INPAT INIT DAY: CPT

## 2021-10-04 PROCEDURE — U0003: CPT

## 2021-10-04 PROCEDURE — 85018 HEMOGLOBIN: CPT

## 2021-10-04 PROCEDURE — 99239 HOSP IP/OBS DSCHRG MGMT >30: CPT

## 2021-10-04 PROCEDURE — 71045 X-RAY EXAM CHEST 1 VIEW: CPT

## 2021-10-04 PROCEDURE — 96365 THER/PROPH/DIAG IV INF INIT: CPT

## 2021-10-04 PROCEDURE — 82962 GLUCOSE BLOOD TEST: CPT

## 2021-10-04 PROCEDURE — 87635 SARS-COV-2 COVID-19 AMP PRB: CPT

## 2021-10-04 PROCEDURE — 85610 PROTHROMBIN TIME: CPT

## 2021-10-04 PROCEDURE — 71250 CT THORAX DX C-: CPT

## 2021-10-04 PROCEDURE — 36415 COLL VENOUS BLD VENIPUNCTURE: CPT

## 2021-10-04 PROCEDURE — 85025 COMPLETE CBC W/AUTO DIFF WBC: CPT

## 2021-10-04 PROCEDURE — 94799 UNLISTED PULMONARY SVC/PX: CPT

## 2021-10-04 PROCEDURE — 83036 HEMOGLOBIN GLYCOSYLATED A1C: CPT

## 2021-10-04 PROCEDURE — 87641 MR-STAPH DNA AMP PROBE: CPT

## 2021-10-04 PROCEDURE — 83735 ASSAY OF MAGNESIUM: CPT

## 2021-10-04 PROCEDURE — 83690 ASSAY OF LIPASE: CPT

## 2021-10-04 PROCEDURE — 94640 AIRWAY INHALATION TREATMENT: CPT

## 2021-10-04 PROCEDURE — 0225U NFCT DS DNA&RNA 21 SARSCOV2: CPT

## 2021-10-04 PROCEDURE — 83605 ASSAY OF LACTIC ACID: CPT

## 2021-10-04 RX ORDER — INSULIN LISPRO 100/ML
0 VIAL (ML) SUBCUTANEOUS
Qty: 0 | Refills: 0 | DISCHARGE
Start: 2021-10-04

## 2021-10-04 RX ADMIN — Medication 1 TABLET(S): at 11:25

## 2021-10-04 RX ADMIN — Medication 1 GRAM(S): at 11:25

## 2021-10-04 RX ADMIN — PANTOPRAZOLE SODIUM 40 MILLIGRAM(S): 20 TABLET, DELAYED RELEASE ORAL at 11:25

## 2021-10-04 RX ADMIN — Medication 1 MILLIGRAM(S): at 06:05

## 2021-10-04 RX ADMIN — CHLORHEXIDINE GLUCONATE 15 MILLILITER(S): 213 SOLUTION TOPICAL at 06:05

## 2021-10-04 RX ADMIN — Medication 1 PUFF(S): at 13:39

## 2021-10-04 RX ADMIN — Medication 1 MILLIGRAM(S): at 10:22

## 2021-10-04 RX ADMIN — Medication 1 PUFF(S): at 00:24

## 2021-10-04 RX ADMIN — FENTANYL CITRATE 1 PATCH: 50 INJECTION INTRAVENOUS at 07:51

## 2021-10-04 RX ADMIN — Medication 1 APPLICATION(S): at 11:27

## 2021-10-04 RX ADMIN — Medication 1 GRAM(S): at 06:05

## 2021-10-04 RX ADMIN — Medication 81 MILLIGRAM(S): at 11:25

## 2021-10-04 RX ADMIN — ALBUTEROL 1 PUFF(S): 90 AEROSOL, METERED ORAL at 13:39

## 2021-10-04 RX ADMIN — ALBUTEROL 1 PUFF(S): 90 AEROSOL, METERED ORAL at 06:44

## 2021-10-04 RX ADMIN — Medication 1 MILLIGRAM(S): at 11:27

## 2021-10-04 RX ADMIN — HEPARIN SODIUM 5000 UNIT(S): 5000 INJECTION INTRAVENOUS; SUBCUTANEOUS at 06:05

## 2021-10-04 RX ADMIN — Medication 1 PUFF(S): at 06:44

## 2021-10-04 RX ADMIN — SENNA PLUS 1 TABLET(S): 8.6 TABLET ORAL at 11:43

## 2021-10-04 RX ADMIN — METHOCARBAMOL 250 MILLIGRAM(S): 500 TABLET, FILM COATED ORAL at 06:05

## 2021-10-04 NOTE — PROGRESS NOTE ADULT - SUBJECTIVE AND OBJECTIVE BOX
Date/Time Patient Seen:  		  Referring MD:   Data Reviewed	       Patient is a 75y old  Female who presents with a chief complaint of Respiratory distress. (03 Oct 2021 11:46)      Subjective/HPI     PAST MEDICAL & SURGICAL HISTORY:  Dementia of frontal lobe type    Aphasic stroke    Diabetes mellitus    Respiratory failure    Hypertension    GERD (gastroesophageal reflux disease)    Constipation    Respiratory failure    CVA (cerebral vascular accident)    HTN (hypertension)    DM (diabetes mellitus)    Advanced dementia    COVID-19 virus detected    Quadriplegia    Pneumonia    Type II diabetes mellitus    Hx of appendectomy    Gastrostomy in place    Tracheostomy in place    Tracheostomy tube present    Feeding by G-tube          Medication list         MEDICATIONS  (STANDING):  aspirin  chewable 81 milliGRAM(s) Oral daily  bisacodyl Suppository 10 milliGRAM(s) Rectal at bedtime  chlorhexidine 0.12% Liquid 15 milliLiter(s) Oral Mucosa every 12 hours  dextrose 40% Gel 15 Gram(s) Oral once  dextrose 5%. 1000 milliLiter(s) (50 mL/Hr) IV Continuous <Continuous>  dextrose 5%. 1000 milliLiter(s) (100 mL/Hr) IV Continuous <Continuous>  dextrose 50% Injectable 25 Gram(s) IV Push once  dextrose 50% Injectable 12.5 Gram(s) IV Push once  dextrose 50% Injectable 25 Gram(s) IV Push once  glucagon  Injectable 1 milliGRAM(s) IntraMuscular once  heparin   Injectable 5000 Unit(s) SubCutaneous every 8 hours  insulin glargine Injectable (LANTUS) 36 Unit(s) SubCutaneous at bedtime  insulin lispro (ADMELOG) corrective regimen sliding scale   SubCutaneous every 6 hours  ipratropium 17 MICROgram(s) HFA Inhaler 1 Puff(s) Inhalation every 6 hours  lactobacillus acidophilus 1 Tablet(s) Oral daily  LORazepam     Tablet 1 milliGRAM(s) Oral every 6 hours  methocarbamol 250 milliGRAM(s) Oral two times a day  pantoprazole  Injectable 40 milliGRAM(s) IV Push daily  povidone iodine 10% Solution 1 Application(s) Topical daily  senna 1 Tablet(s) Oral daily  sucralfate suspension 1 Gram(s) Enteral Tube four times a day    MEDICATIONS  (PRN):  ALBUTerol    90 MICROgram(s) HFA Inhaler 1 Puff(s) Inhalation every 6 hours PRN Shortness of Breath and/or Wheezing  LORazepam   Injectable 1 milliGRAM(s) IV Push every 6 hours PRN agtation and vent dyssynchrony  polyethylene glycol 3350 17 Gram(s) Oral every 12 hours PRN Constipation  simethicone 80 milliGRAM(s) Chew two times a day PRN Dyspepsia         Vitals log        ICU Vital Signs Last 24 Hrs  T(C): 36.6 (04 Oct 2021 04:00), Max: 37.7 (03 Oct 2021 08:00)  T(F): 97.9 (04 Oct 2021 04:00), Max: 99.8 (03 Oct 2021 08:00)  HR: 86 (04 Oct 2021 06:00) (69 - 107)  BP: 143/72 (04 Oct 2021 06:00) (109/53 - 146/82)  BP(mean): 89 (04 Oct 2021 06:00) (70 - 98)  ABP: --  ABP(mean): --  RR: 24 (04 Oct 2021 06:00) (17 - 31)  SpO2: 100% (04 Oct 2021 06:00) (97% - 100%)       Mode: prvc  RR (machine): 18  TV (machine): 400  FiO2: 30  PEEP: 5  ITime: 1.15  MAP: 14  PIP: 30      Input and Output:  I&O's Detail    02 Oct 2021 07:01  -  03 Oct 2021 07:00  --------------------------------------------------------  IN:    Enteral Tube Flush: 240 mL    Free Water: 800 mL    IV PiggyBack: 100 mL    Jevity 1.5: 690 mL  Total IN: 1830 mL    OUT:    Voided (mL): 1500 mL  Total OUT: 1500 mL    Total NET: 330 mL      03 Oct 2021 07:01  -  04 Oct 2021 06:46  --------------------------------------------------------  IN:    Free Water: 400 mL    Jevity 1.5: 360 mL  Total IN: 760 mL    OUT:    Voided (mL): 1600 mL  Total OUT: 1600 mL    Total NET: -840 mL          Lab Data                        8.4    6.86  )-----------( 382      ( 03 Oct 2021 07:22 )             28.4     10-03    141  |  107  |  11  ----------------------------<  104<H>  4.4   |  26  |  0.90    Ca    8.2<L>      03 Oct 2021 07:22    TPro  5.9<L>  /  Alb  2.0<L>  /  TBili  0.4  /  DBili  x   /  AST  14  /  ALT  19  /  AlkPhos  104  10-03            Review of Systems	      Objective     Physical Examination    heart s1s2  lung dc BS  abd soft  head nc      Pertinent Lab findings & Imaging      Annalise:  NO   Adequate UO     I&O's Detail    02 Oct 2021 07:01  -  03 Oct 2021 07:00  --------------------------------------------------------  IN:    Enteral Tube Flush: 240 mL    Free Water: 800 mL    IV PiggyBack: 100 mL    Jevity 1.5: 690 mL  Total IN: 1830 mL    OUT:    Voided (mL): 1500 mL  Total OUT: 1500 mL    Total NET: 330 mL      03 Oct 2021 07:01  -  04 Oct 2021 06:46  --------------------------------------------------------  IN:    Free Water: 400 mL    Jevity 1.5: 360 mL  Total IN: 760 mL    OUT:    Voided (mL): 1600 mL  Total OUT: 1600 mL    Total NET: -840 mL               Discussed with:     Cultures:	        Radiology

## 2021-10-04 NOTE — PROGRESS NOTE ADULT - REASON FOR ADMISSION
Respiratory distress.

## 2021-10-04 NOTE — PROGRESS NOTE ADULT - SUBJECTIVE AND OBJECTIVE BOX
Chief Complaint: Respiratory distress    Interval Events: No events overnight.    Review of Systems:  Unable to obtain    Physical Exam:  Vital Signs Last 24 Hrs  T(C): 36.7 (04 Oct 2021 08:30), Max: 37.2 (03 Oct 2021 16:00)  T(F): 98 (04 Oct 2021 08:30), Max: 99 (03 Oct 2021 16:00)  HR: 81 (04 Oct 2021 08:00) (76 - 107)  BP: 143/70 (04 Oct 2021 08:00) (123/55 - 146/82)  BP(mean): 90 (04 Oct 2021 08:00) (76 - 98)  RR: 19 (04 Oct 2021 08:00) (17 - 31)  SpO2: 100% (04 Oct 2021 08:00) (97% - 100%)  General: Chronically ill appearing  HEENT: Trach  Neck: No JVD, no carotid bruit  Lungs: Coarse bilaterally  CV: RRR, nl S1/S2, no M/R/G  Abdomen: S/NT/ND, +BS  Extremities: No LE edema, no cyanosis  Neuro: AAOx0  Skin: No rash    Labs:    10-03    141  |  107  |  11  ----------------------------<  104<H>  4.4   |  26  |  0.90    Ca    8.2<L>      03 Oct 2021 07:22    TPro  5.9<L>  /  Alb  2.0<L>  /  TBili  0.4  /  DBili  x   /  AST  14  /  ALT  19  /  AlkPhos  104  10-03                        8.4    6.86  )-----------( 382      ( 03 Oct 2021 07:22 )             28.4       Telemetry: Sinus rhythm

## 2021-10-04 NOTE — DISCHARGE NOTE NURSING/CASE MANAGEMENT/SOCIAL WORK - PATIENT PORTAL LINK FT
You can access the FollowMyHealth Patient Portal offered by [facility] by registering at the following website: [email or web address]. By joining [facility]’s FollowMyHealth portal, you will also be able to view your health information using other applications (apps) compatible with our system.

## 2021-10-04 NOTE — PROGRESS NOTE ADULT - SUBJECTIVE AND OBJECTIVE BOX
[facility] DIVISION of INFECTIOUS DISEASE  Arturo Olivas MD PhD, Haylee Umanzor MD, Andressa Brantley MD, Billy Valenzuela MD, Emil Medellin MD  and providing coverage with Alexa Canas MD and Eusebio Chairez MD  Providing Infectious Disease Consultations at [facility], [facility]    Office# [phone] to schedule follow up appointments  Answering Service for urgent calls or New Consults [phone]  Cell# to text for urgent issues Arturo Olivas [phone]     infectious diseases progress note:    BREA BECKHAM is a 75y y. o. Female patient    Patient remains nonverbal on vent via trach    Allergies    codeine (Hives)    Intolerances        ANTIBIOTICS/RELEVANT:  antimicrobials    immunologic:    OTHER:  ALBUTerol    90 MICROgram(s) HFA Inhaler 1 Puff(s) Inhalation every 6 hours PRN  aspirin  chewable 81 milliGRAM(s) Oral daily  bisacodyl Suppository 10 milliGRAM(s) Rectal at bedtime  chlorhexidine 0.12% Liquid 15 milliLiter(s) Oral Mucosa every 12 hours  dextrose 40% Gel 15 Gram(s) Oral once  dextrose 5%. 1000 milliLiter(s) IV Continuous <Continuous>  dextrose 5%. 1000 milliLiter(s) IV Continuous <Continuous>  dextrose 50% Injectable 25 Gram(s) IV Push once  dextrose 50% Injectable 12.5 Gram(s) IV Push once  dextrose 50% Injectable 25 Gram(s) IV Push once  glucagon  Injectable 1 milliGRAM(s) IntraMuscular once  heparin   Injectable 5000 Unit(s) SubCutaneous every 8 hours  insulin glargine Injectable (LANTUS) 36 Unit(s) SubCutaneous at bedtime  insulin lispro (ADMELOG) corrective regimen sliding scale   SubCutaneous every 6 hours  ipratropium 17 MICROgram(s) HFA Inhaler 1 Puff(s) Inhalation every 6 hours  lactobacillus acidophilus 1 Tablet(s) Oral daily  LORazepam     Tablet 1 milliGRAM(s) Oral every 6 hours  LORazepam   Injectable 1 milliGRAM(s) IV Push every 6 hours PRN  methocarbamol 250 milliGRAM(s) Oral two times a day  pantoprazole  Injectable 40 milliGRAM(s) IV Push daily  polyethylene glycol 3350 17 Gram(s) Oral every 12 hours PRN  povidone iodine 10% Solution 1 Application(s) Topical daily  senna 1 Tablet(s) Oral daily  simethicone 80 milliGRAM(s) Chew two times a day PRN  sucralfate suspension 1 Gram(s) Enteral Tube four times a day      Objective:  Last 24-Vital Signs Last 24 Hrs  T(C): 36.7 (04 Oct 2021 08:30), Max: 37.2 (03 Oct 2021 16:00)  T(F): 98 (04 Oct 2021 08:30), Max: 99 (03 Oct 2021 16:00)  HR: 81 (04 Oct 2021 08:00) (76 - 107)  BP: 143/70 (04 Oct 2021 08:00) (123/55 - 146/82)  BP(mean): 90 (04 Oct 2021 08:00) (76 - 98)  RR: 19 (04 Oct 2021 08:00) (17 - 31)  SpO2: 100% (04 Oct 2021 08:00) (97% - 100%)    T(C): 36.7 (10-04-21 @ 08:30), Max: 37.7 (10-03-21 @ 08:00)  T(F): 98 (10-04-21 @ 08:30), Max: 99.8 (10-03-21 @ 08:00)  T(C): 36.7 (10-04-21 @ 08:30), Max: 37.7 (10-03-21 @ 08:00)  T(F): 98 (10-04-21 @ 08:30), Max: 99.8 (10-03-21 @ 08:00)  T(C): 36.7 (10-04-21 @ 08:30), Max: 37.7 (10-03-21 @ 08:00)  T(F): 98 (10-04-21 @ 08:30), Max: 99.8 (10-03-21 @ 08:00)    PHYSICAL EXAM:  Constitutional: Well-developed, well nourished  Eyes: PERRLA, EOMI  Ear/Nose/Throat: oropharynx normal	  Neck: no JVD, no lymphadenopathy, supple  Respiratory: no accessory muscle use, lung fields bilaterally symmetrical u[ppe airway sounds  Cardiovascular: distant  Gastrointestinal: soft, NT, no HSM, BS-normal  Extremities: no clubbing, no cyanosis, feet in boots  Neuro: patient alert,    Mode: PRVC, RR (machine): 18, TV (machine): 400, FiO2: 30, PEEP: 5, ITime: 1.15, MAP: 17, PIP: 29    LABS:                        8.4    6.86  )-----------( 382      ( 03 Oct 2021 07:22 )             28.4       WBC 6.86  10-03 @ 07:22  WBC 9.05  10-01 @ 06:56  WBC 10.07 09-30 @ 08:59  WBC 7.44  09-29 @ 07:08  WBC 7.82  09-27 @ 12:08      10-03    141  |  107  |  11  ----------------------------<  104<H>  4.4   |  26  |  0.90    Ca    8.2<L>      03 Oct 2021 07:22    TPro  5.9<L>  /  Alb  2.0<L>  /  TBili  0.4  /  DBili  x   /  AST  14  /  ALT  19  /  AlkPhos  104  10-03      Creatinine, Serum: 0.90 mg/dL (10-03-21 @ 07:22)  Creatinine, Serum: 1.02 mg/dL (10-01-21 @ 06:56)  Creatinine, Serum: 0.91 mg/dL (09-30-21 @ 08:59)  Creatinine, Serum: 0.92 mg/dL (09-29-21 @ 07:08)      MICROBIOLOGY:              RADIOLOGY & ADDITIONAL STUDIES:

## 2021-10-04 NOTE — PROGRESS NOTE ADULT - ASSESSMENT
PARASHARAMI BREA 75 f [facility] S 9/24/2021   DR ILIANA KEMP     REVIEW OF SYMPTOMS      Able to give (reliable) ROS  NO     PHYSICAL EXAM    HEENT Unremarkable  atraumatic   RESP Fair air entry EXP prolonged    Harsh breath sound Resp distres mild   CARDIAC S1 S2 No S3     NO JVD    ABDOMEN SOFT BS PRESENT NOT DISTENDED No hepatosplenomegaly   PEDAL EDEMA present No calf tenderness  NO rash       PROBLEMS.        VAP poa 9/25  UTI poa 9/25  VDRF (pmh)  PEG (pmh)  ANOXIC ENCEPH (pmh)     WORSENING CXR 9/27/2021   ANEMIA 9/29/2021 Hb 7.5          BEST PRACTICE ISSUES.                                                  HEAD OF BED ELEVATION. Yes  DVT PROPHYLAXIS.   hpsc 9/26/2021                                       SQUIRES PROPHYLAXIS.  carafate (9/25)                                                                                         DIET.            jevity 1.5 30/h 9/26/2021 gt               INFECTION PROPHYLAXIS.   chlorhexidine .12% 9/26/2021                      GENERAL ISSUES  GOC ADVANCED DIRECTIVE.                                         ALLERGY.  codeine                            WEIGHT.      9/25/2021 61                              BMI.                 9/25/2021 22     PATIENT DATA   TUBES  PROCEDURES.     poa Trach  poa peg    ABG.   9/26 7p 40%/vent 743/39/76    OXYGENATION.                   VITALS/IO/VENT/DRIPS.    10/4/2021 67 100/49   10/4/2021 ac 18 400 5 .3     PROBLEM ASSESSMENT/RECOMMENDATIONS.  COVID STATUS.  scv2 9/25/2021 (-)  spk ab 9/26/2021 (+) 250   RESP.  VDRF.   Lung protective ventilation  target po 90-95%  clinically stable     INFECTION.  VAP.   UTI.  W 9/25-9/26-9/27/2021 w 13-9.6-7.8   pr 9/26/2021 pr 2.1   ua 9/25/2021 w 26-50 l estrase mod   9/27/2021 cxr bl perihilar and lower lobe consolidations progressive   eventration r diaphr   ct cap 9/25/2021 dense bl cpnsolidations new mediastinal lne Small bl effs dilated fluid filled rectum   mrsa 9/27 (-)   9/26 sp   1) stenotrophomonas   2) carbapenem resistant pseucomonas   3) serratia  urine c 9/25 uc 100k proteus   zosyn 9/25 Abio course complketd  9/30     COPD.  prov p (9/25)  atrov (9/25)  under control   ANEMIA.  Hb 9/25-9/26-9/27-9/29-10/3/2021 hb 10 - 9.1-8.4-7.5  - 8.4    ASSESSMENT RECOMMENDATIONS .    VDRF anoxic encephalopathy patient  admitted 9/25/2021 with VAP UTI resp distress started on completd bsab  dc planning      TIME SPENT   Over 36 minutes aggregate critical care time spent on encounter; activities included   direct patient care, counseling and/or coordinating care reviewing notes, lab data/ imaging , discussion with multidisciplinary team/ patient  /family and explaining in detail risks, benefits, alternatives  of the recommendations     CHAPINCITO GUIDRY 75 f [facility] S 9/24/2021   DR ILIANA KEMP

## 2021-10-04 NOTE — DISCHARGE NOTE PROVIDER - NSDCCPCAREPLAN_GEN_ALL_CORE_FT
PRINCIPAL DISCHARGE DIAGNOSIS  Diagnosis: Other specified sepsis  Assessment and Plan of Treatment: ·  Aspiration pneumonia.   ·  Plan: recurrent, versus vent associated PNA, start on aspiration precautions, patient wastreated with Zosyn   sputum cultue peudomonas and serratia , d/w ID continue present management.  Discharge plan.   Problem/Plan - 2:  ·  Problem: Acute on chronic respiratory failure with hypoxia.   trach to vent.   Problem/Plan - 3:  ·  Problem: UTI (urinary tract infection).   ·  Plan: recurrent, culture last admission grew up proteus Mirabilis, was treated with  Zosyn   Problem/Plan - 4:  ·  Problem: Sepsis.   ·  Plan: resolved.   Problem/Plan - 5:  ·  Problem: RYAN (acute kidney injury).   ·  Plan: resolved.   Problem/Plan - 6:  ·  Problem: Hypermagnesemia.   ·  Plan: resolved.   Problem/Plan - 7:  ·  Problem: Type 2 diabetes mellitus.   ·  Plan: uncontrolled, continue on Lantus insulin 36 units at bedtime in addition to corrective insulin Lispro scale coverage before Q 6h.   Problem/Plan - 8:  ·  Problem: Functional quadriplegia.   ·  Plan: supportive care, specialty bed with low air loss mattress, wound care team consult.  Chronic anemia  trend cbc and transfuse prn.      SECONDARY DISCHARGE DIAGNOSES  Diagnosis: Acute UTI  Assessment and Plan of Treatment:     Diagnosis: Pneumonia  Assessment and Plan of Treatment:     Diagnosis: Respiratory distress  Assessment and Plan of Treatment:

## 2021-10-04 NOTE — PROGRESS NOTE ADULT - ASSESSMENT
74 y/o female with a PMHx of DM2, pna, quadriplegia, advanced dementia, DM, CVA, GERD, HTN presents to the ED c/o recent admitted to [facility] 9/7-9/15 after being found unresponsive at nursing home, concerned about post operative distress, hypotensive signs of aspirational pna, with volume dissipated, treated with levosa, zosyn. Admitted to ICU for aspirational pna. Urinary culture proteus, which was also sensitive to Zosyn. Also appears to have paronychia of toe which she had Zyvox during hospital stay. She was found to be anemic so she was transfused of 1 L RBC, no signs of active bleeding and pt stabilized. Pt was stabilized and d/c back to rehab. Patient reported for reported respiratory distress. Pt found to be on normal vent settings, does have increased  respiratory rate. No further hx can be obtained at this time as pt is nonverbal.  sepsis - shock - chr resp failure - anemia - acute infection - pna - dysphagia - anoxia - dementia     poss DC on Monday - CM notes reviewed -     s/p ABX  HOB elev  oral hygiene  skin care  assist with ADL  full code - spoke with  - HCP  cx in progress  ABG noted  vent support  monitor VS and HD and Sat  CCM notes reviewed  vent support in progress - not a candidate for weaning  old records reviewed

## 2021-10-04 NOTE — PROGRESS NOTE ADULT - SUBJECTIVE AND OBJECTIVE BOX
Neurology follow up note    BREA [id number]      Interval History:    Patient resting in bed     Allergies    codeine (Hives)    Intolerances        MEDICATIONS    ALBUTerol    90 MICROgram(s) HFA Inhaler 1 Puff(s) Inhalation every 6 hours PRN  aspirin  chewable 81 milliGRAM(s) Oral daily  bisacodyl Suppository 10 milliGRAM(s) Rectal at bedtime  chlorhexidine 0.12% Liquid 15 milliLiter(s) Oral Mucosa every 12 hours  dextrose 40% Gel 15 Gram(s) Oral once  dextrose 5%. 1000 milliLiter(s) IV Continuous <Continuous>  dextrose 5%. 1000 milliLiter(s) IV Continuous <Continuous>  dextrose 50% Injectable 25 Gram(s) IV Push once  dextrose 50% Injectable 12.5 Gram(s) IV Push once  dextrose 50% Injectable 25 Gram(s) IV Push once  glucagon  Injectable 1 milliGRAM(s) IntraMuscular once  heparin   Injectable 5000 Unit(s) SubCutaneous every 8 hours  insulin glargine Injectable (LANTUS) 36 Unit(s) SubCutaneous at bedtime  insulin lispro (ADMELOG) corrective regimen sliding scale   SubCutaneous every 6 hours  ipratropium 17 MICROgram(s) HFA Inhaler 1 Puff(s) Inhalation every 6 hours  lactobacillus acidophilus 1 Tablet(s) Oral daily  LORazepam     Tablet 1 milliGRAM(s) Oral every 6 hours  LORazepam   Injectable 1 milliGRAM(s) IV Push every 6 hours PRN  methocarbamol 250 milliGRAM(s) Oral two times a day  pantoprazole  Injectable 40 milliGRAM(s) IV Push daily  polyethylene glycol 3350 17 Gram(s) Oral every 12 hours PRN  povidone iodine 10% Solution 1 Application(s) Topical daily  senna 1 Tablet(s) Oral daily  simethicone 80 milliGRAM(s) Chew two times a day PRN  sucralfate suspension 1 Gram(s) Enteral Tube four times a day              Vital Signs Last 24 Hrs  T(C): 36.7 (04 Oct 2021 08:30), Max: 37.2 (03 Oct 2021 16:00)  T(F): 98 (04 Oct 2021 08:30), Max: 99 (03 Oct 2021 16:00)  HR: 72 (04 Oct 2021 12:00) (72 - 987)  BP: 96/52 (04 Oct 2021 12:00) (96/52 - 152/65)  BP(mean): 66 (04 Oct 2021 12:00) (66 - 98)  RR: 8 (04 Oct 2021 12:00) (7 - 31)  SpO2: 100% (04 Oct 2021 12:00) (97% - 100%)      REVIEW OF SYSTEMS:  Could not be obtained secondary to the patient being nonverbal.  As per my conversation with the spouse, a gradual process along with underlying strokes causing her to become bed-bound.    PHYSICAL EXAMINATION:    Head:  Normocephalic, atraumatic.  Eyes:  No scleral icterus.  Ears:  Cannot be evaluated secondary to the patient being nonverbal.  NECK:  Tracheostomy was in place.  RESPIRATORY:  Good air entry bilaterally.  CARDIOVASCULAR:  S1 and S2 heard.  ABDOMEN:  Soft and nontender.  EXTREMITIES:  No clubbing or cyanosis were noted.      NEUROLOGIC:  The patient was awake in bed.  Upon stimulating the patient, her eyes did roll up.  Her body started to stiffen with abnormal mouth movements, lasted one to two minutes.  Pupils bilaterally were 3 mm, reactive to 2 mm.  The patient has her arms in a flexed position with both hands contracted.  Bilateral lower extremities were in a straight position.  The patient has increased tone, bilateral upper and lower extremities.                LABS:  CBC Full  -  ( 03 Oct 2021 07:22 )  WBC Count : 6.86 K/uL  RBC Count : 3.09 M/uL  Hemoglobin : 8.4 g/dL  Hematocrit : 28.4 %  Platelet Count - Automated : 382 K/uL  Mean Cell Volume : 91.9 fl  Mean Cell Hemoglobin : 27.2 pg  Mean Cell Hemoglobin Concentration : 29.6 gm/dL  Auto Neutrophil # : x  Auto Lymphocyte # : x  Auto Monocyte # : x  Auto Eosinophil # : x  Auto Basophil # : x  Auto Neutrophil % : x  Auto Lymphocyte % : x  Auto Monocyte % : x  Auto Eosinophil % : x  Auto Basophil % : x      10-03    141  |  107  |  11  ----------------------------<  104<H>  4.4   |  26  |  0.90    Ca    8.2<L>      03 Oct 2021 07:22    TPro  5.9<L>  /  Alb  2.0<L>  /  TBili  0.4  /  DBili  x   /  AST  14  /  ALT  19  /  AlkPhos  104  10-03    Hemoglobin A1C:     LIVER FUNCTIONS - ( 03 Oct 2021 07:22 )  Alb: 2.0 g/dL / Pro: 5.9 g/dL / ALK PHOS: 104 U/L / ALT: 19 U/L DA / AST: 14 U/L / GGT: x           Vitamin B12         RADIOLOGY      ANALYSIS AND PLAN:  A 75-year-old with an episode of altered mental status, respiratory issues, and abnormal movements.  For episode of abnormal movements, as per my conversation with the spouse, in the past, these, as per him, are not epileptic in nature, does not want any type of antiseizure medications.  We will continue the patient on muscle relaxants.  For altered mental status, secondary to respiratory issues, metabolic encephalopathy.  For history of diabetes, strict control of blood sugars.  For history of TIA and strokes, continue the patient on current home medications.  The spouse's name is Marciano, his telephone number is [phone].    Greater than 40 minutes of time was spent with the patient, plan of care, reviewing data, and speaking to the multidisciplinary healthcare team with greater than 50% of that time in counseling and care coordination.    Thank you for the courtesy of this consultation.

## 2021-10-04 NOTE — PROGRESS NOTE ADULT - SUBJECTIVE AND OBJECTIVE BOX
Patient is a 75y Female whom presented to the hospital with ckd and manasa     PAST MEDICAL & SURGICAL HISTORY:  Dementia of frontal lobe type    Aphasic stroke    Diabetes mellitus    Respiratory failure    Hypertension    GERD (gastroesophageal reflux disease)    Constipation    Respiratory failure    CVA (cerebral vascular accident)    HTN (hypertension)    DM (diabetes mellitus)    Advanced dementia    COVID-19 virus detected    Quadriplegia    Pneumonia    Type II diabetes mellitus    Hx of appendectomy    Gastrostomy in place    Tracheostomy in place    Tracheostomy tube present    Feeding by G-tube        MEDICATIONS  (STANDING):  aspirin  chewable 81 milliGRAM(s) Oral daily  chlorhexidine 0.12% Liquid 15 milliLiter(s) Oral Mucosa every 12 hours  chlorhexidine 4% Liquid 1 Application(s) Topical <User Schedule>  dextrose 40% Gel 15 Gram(s) Oral once  dextrose 5%. 1000 milliLiter(s) (50 mL/Hr) IV Continuous <Continuous>  dextrose 5%. 1000 milliLiter(s) (100 mL/Hr) IV Continuous <Continuous>  dextrose 50% Injectable 25 Gram(s) IV Push once  dextrose 50% Injectable 12.5 Gram(s) IV Push once  dextrose 50% Injectable 25 Gram(s) IV Push once  glucagon  Injectable 1 milliGRAM(s) IntraMuscular once  heparin   Injectable 5000 Unit(s) SubCutaneous every 8 hours  insulin lispro (ADMELOG) corrective regimen sliding scale   SubCutaneous every 6 hours  ipratropium 17 MICROgram(s) HFA Inhaler 1 Puff(s) Inhalation every 6 hours  lactated ringers. 1000 milliLiter(s) (80 mL/Hr) IV Continuous <Continuous>  lactobacillus acidophilus 1 Tablet(s) Oral daily  norepinephrine Infusion 0.05 MICROgram(s)/kG/Min (5.74 mL/Hr) IV Continuous <Continuous>  pantoprazole  Injectable 40 milliGRAM(s) IV Push daily  piperacillin/tazobactam IVPB.. 3.375 Gram(s) IV Intermittent every 8 hours  senna 1 Tablet(s) Oral daily      Allergies    codeine (Hives)    Intolerances        SOCIAL HISTORY:  Denies ETOh,Smoking,     FAMILY HISTORY:  No pertinent family history in first degree relatives        REVIEW OF SYSTEMS:  unable to obtained a good review system                                                                                                                              8.4    6.86  )-----------( 382      ( 03 Oct 2021 07:22 )             28.4       CBC Full  -  ( 03 Oct 2021 07:22 )  WBC Count : 6.86 K/uL  RBC Count : 3.09 M/uL  Hemoglobin : 8.4 g/dL  Hematocrit : 28.4 %  Platelet Count - Automated : 382 K/uL  Mean Cell Volume : 91.9 fl  Mean Cell Hemoglobin : 27.2 pg  Mean Cell Hemoglobin Concentration : 29.6 gm/dL  Auto Neutrophil # : x  Auto Lymphocyte # : x  Auto Monocyte # : x  Auto Eosinophil # : x  Auto Basophil # : x  Auto Neutrophil % : x  Auto Lymphocyte % : x  Auto Monocyte % : x  Auto Eosinophil % : x  Auto Basophil % : x      10-03    141  |  107  |  11  ----------------------------<  104<H>  4.4   |  26  |  0.90    Ca    8.2<L>      03 Oct 2021 07:22    TPro  5.9<L>  /  Alb  2.0<L>  /  TBili  0.4  /  DBili  x   /  AST  14  /  ALT  19  /  AlkPhos  104  10-03      CAPILLARY BLOOD GLUCOSE      POCT Blood Glucose.: 109 mg/dL (04 Oct 2021 11:14)  POCT Blood Glucose.: 84 mg/dL (04 Oct 2021 06:01)  POCT Blood Glucose.: 154 mg/dL (03 Oct 2021 23:03)      Vital Signs Last 24 Hrs  T(C): 37.4 (04 Oct 2021 11:50), Max: 37.4 (04 Oct 2021 11:50)  T(F): 99.3 (04 Oct 2021 11:50), Max: 99.3 (04 Oct 2021 11:50)  HR: 66 (04 Oct 2021 14:12) (66 - 109)  BP: 101/49 (04 Oct 2021 14:12) (89/54 - 152/65)  BP(mean): 65 (04 Oct 2021 14:12) (65 - 98)  RR: 18 (04 Oct 2021 14:12) (7 - 30)  SpO2: 100% (04 Oct 2021 14:12) (97% - 100%)                     PHYSICAL EXAM:    Constitutional: NAD  HEENT: conjunctive   clear   Neck:  No JVD  Respiratory: decrease bs b/l   Cardiovascular: S1 and S2  Gastrointestinal: BS+, soft, pos peg   Extremities: No peripheral edema

## 2021-10-04 NOTE — DISCHARGE NOTE PROVIDER - HOSPITAL COURSE
74 y/o F with PMH of HTN, DM type 2, CVA, recent Cardiac Arrest, Chronic Respiratory Failure, Vent Dependant s/p trach & PEG, Anemia with GI blood loss, and Dementia presented with acute respiratory distress.      Problem/Plan - 1:  ·  Problem: Aspiration pneumonia.   ·  Plan: recurrent, versus vent associated PNA, start on aspiration precautions, patient was started on Zosyn   vanco dc'ed.    ID recs appreciated  Continue on pip-tazo (started late 9/25)   sputum cultue peudomonas and serratia , d/w ID continue present management.  Discharge plan.     Problem/Plan - 2:  ·  Problem: Acute on chronic respiratory failure with hypoxia.   ·  Plan: as per .     Problem/Plan - 3:  ·  Problem: UTI (urinary tract infection).   ·  Plan: recurrent, culture last admission grew up proteus Mirabilis, already on Zosyn, f/u culture results.    ID recs appreciated.     Problem/Plan - 4:  ·  Problem: Sepsis.   ·  Plan: resolved.     Problem/Plan - 5:  ·  Problem: RYAN (acute kidney injury).   ·  Plan: resolved.     Problem/Plan - 6:  ·  Problem: Hypermagnesemia.   ·  Plan: resolved.     Problem/Plan - 7:  ·  Problem: Type 2 diabetes mellitus.   ·  Plan: uncontrolled, continue on Lantus insulin 36 units at bedtime in addition to corrective insulin Lispro scale coverage before Q 6h, check glycohemoglobin level in am.     Problem/Plan - 8:  ·  Problem: Functional quadriplegia.   ·  Plan: supportive care, specialty bed with low air loss mattress, wound care team consult.        T(C): 36.6 (10-04-21 @ 04:00), Max: 37.7 (10-03-21 @ 08:00)  HR: 80 (10-04-21 @ 06:48) (69 - 107)  BP: 143/72 (10-04-21 @ 06:00) (109/53 - 146/82)  RR: 24 (10-04-21 @ 06:00) (17 - 31)  SpO2: 100% (10-04-21 @ 06:48) (97% - 100%)  Wt(kg): --  REVIEW OF SYSTEMS:    unable to obtain due to underlying medical condition.    PHYSICAL EXAM:  GENERAL: NAD, well-groomed, well-developed  HEAD:  Atraumatic, Normocephalic  EYES: EOMI, PERRLA, conjunctiva and sclera clear  ENMT: No tonsillar erythema, exudates, or enlargement; Moist mucous membranes, Good dentition, No lesions  NECK: Supple, No JVD, Normal thyroid  NERVOUS SYSTEM: unable to examine due to underlying medical condition.  CHEST/LUNG: Clear to auscultation bilaterally; No rales, rhonchi, wheezing, or rubs  HEART: Regular rate and rhythm; No murmurs, rubs, or gallops  ABDOMEN: Soft, N Nondistended; Bowel sounds present  EXTREMITIES:  2+ Peripheral Pulses, No clubbing or cyanosis      Time spent >60minutes.         74 y/o F with PMH of HTN, DM type 2, CVA, recent Cardiac Arrest, Chronic Respiratory Failure, Vent Dependant s/p trach & PEG, Anemia with GI blood loss, and Dementia presented with acute respiratory distress.      Problem/Plan - 1:  ·  Aspiration pneumonia.   ·  Plan: recurrent, versus vent associated PNA, start on aspiration precautions, patient wastreated with Zosyn     sputum cultue peudomonas and serratia , d/w ID continue present management.  Discharge plan.     Problem/Plan - 2:  ·  Problem: Acute on chronic respiratory failure with hypoxia.   trach to vent.     Problem/Plan - 3:  ·  Problem: UTI (urinary tract infection).   ·  Plan: recurrent, culture last admission grew up proteus Mirabilis, was treated with  Zosyn     Problem/Plan - 4:  ·  Problem: Sepsis.   ·  Plan: resolved.     Problem/Plan - 5:  ·  Problem: RYAN (acute kidney injury).   ·  Plan: resolved.     Problem/Plan - 6:  ·  Problem: Hypermagnesemia.   ·  Plan: resolved.     Problem/Plan - 7:  ·  Problem: Type 2 diabetes mellitus.   ·  Plan: uncontrolled, continue on Lantus insulin 36 units at bedtime in addition to corrective insulin Lispro scale coverage before Q 6h.     Problem/Plan - 8:  ·  Problem: Functional quadriplegia.   ·  Plan: supportive care, specialty bed with low air loss mattress, wound care team consult.        T(C): 36.6 (10-04-21 @ 04:00), Max: 37.7 (10-03-21 @ 08:00)  HR: 80 (10-04-21 @ 06:48) (69 - 107)  BP: 143/72 (10-04-21 @ 06:00) (109/53 - 146/82)  RR: 24 (10-04-21 @ 06:00) (17 - 31)  SpO2: 100% (10-04-21 @ 06:48) (97% - 100%)  Wt(kg): --  REVIEW OF SYSTEMS:    unable to obtain due to underlying medical condition.    PHYSICAL EXAM:  GENERAL: NAD, well-groomed, well-developed  HEAD:  Atraumatic, Normocephalic  EYES: EOMI, PERRLA, conjunctiva and sclera clear  ENMT: No tonsillar erythema, exudates, or enlargement; Moist mucous membranes, Good dentition, No lesions  NECK: Supple, No JVD, Normal thyroid  NERVOUS SYSTEM: unable to examine due to underlying medical condition.  CHEST/LUNG: Clear to auscultation bilaterally; No rales, rhonchi, wheezing, or rubs  HEART: Regular rate and rhythm; No murmurs, rubs, or gallops  ABDOMEN: Soft, N Nondistended; Bowel sounds present  EXTREMITIES:  2+ Peripheral Pulses, No clubbing or cyanosis      Time spent >60minutes.         76 y/o F with PMH of HTN, DM type 2, CVA, recent Cardiac Arrest, Chronic Respiratory Failure, Vent Dependant s/p trach & PEG, Anemia with GI blood loss, and Dementia presented with acute respiratory distress.      Problem/Plan - 1:  ·  Aspiration pneumonia.   ·  Plan: recurrent, versus vent associated PNA, start on aspiration precautions, patient wastreated with Zosyn     sputum cultue peudomonas and serratia , d/w ID continue present management.  Discharge plan.     Problem/Plan - 2:  ·  Problem: Acute on chronic respiratory failure with hypoxia.   trach to vent.     Problem/Plan - 3:  ·  Problem: UTI (urinary tract infection).   ·  Plan: recurrent, culture last admission grew up proteus Mirabilis, was treated with  Zosyn     Problem/Plan - 4:  ·  Problem: Sepsis.   ·  Plan: resolved.     Problem/Plan - 5:  ·  Problem: RYAN (acute kidney injury).   ·  Plan: resolved.     Problem/Plan - 6:  ·  Problem: Hypermagnesemia.   ·  Plan: resolved.     Problem/Plan - 7:  ·  Problem: Type 2 diabetes mellitus.   ·  Plan: uncontrolled, continue on Lantus insulin 36 units at bedtime in addition to corrective insulin Lispro scale coverage before Q 6h.     Problem/Plan - 8:  ·  Problem: Functional quadriplegia.   ·  Plan: supportive care, specialty bed with low air loss mattress, wound care team consult.    Chronic anemia  trend cbc and transfuse prn.      T(C): 36.6 (10-04-21 @ 04:00), Max: 37.7 (10-03-21 @ 08:00)  HR: 80 (10-04-21 @ 06:48) (69 - 107)  BP: 143/72 (10-04-21 @ 06:00) (109/53 - 146/82)  RR: 24 (10-04-21 @ 06:00) (17 - 31)  SpO2: 100% (10-04-21 @ 06:48) (97% - 100%)  Wt(kg): --  REVIEW OF SYSTEMS:    unable to obtain due to underlying medical condition.    PHYSICAL EXAM:  GENERAL: NAD, well-groomed, well-developed  HEAD:  Atraumatic, Normocephalic  EYES: EOMI, PERRLA, conjunctiva and sclera clear  ENMT: No tonsillar erythema, exudates, or enlargement; Moist mucous membranes, Good dentition, No lesions  NECK: Supple, No JVD, Normal thyroid  NERVOUS SYSTEM: unable to examine due to underlying medical condition.  CHEST/LUNG: Clear to auscultation bilaterally; No rales, rhonchi, wheezing, or rubs  HEART: Regular rate and rhythm; No murmurs, rubs, or gallops  ABDOMEN: Soft, N Nondistended; Bowel sounds present  EXTREMITIES:  2+ Peripheral Pulses, No clubbing or cyanosis      Time spent >60minutes.

## 2021-10-04 NOTE — PROGRESS NOTE ADULT - NUTRITIONAL ASSESSMENT
MEDICATIONS  (STANDING):  aspirin  chewable 81 milliGRAM(s) Oral daily  bisacodyl Suppository 10 milliGRAM(s) Rectal at bedtime  chlorhexidine 0.12% Liquid 15 milliLiter(s) Oral Mucosa every 12 hours  dexMEDEtomidine Infusion 0.2 MICROgram(s)/kG/Hr (3.06 mL/Hr) IV Continuous <Continuous>  dextrose 40% Gel 15 Gram(s) Oral once  dextrose 5%. 1000 milliLiter(s) (100 mL/Hr) IV Continuous <Continuous>  dextrose 5%. 1000 milliLiter(s) (50 mL/Hr) IV Continuous <Continuous>  dextrose 50% Injectable 25 Gram(s) IV Push once  dextrose 50% Injectable 12.5 Gram(s) IV Push once  dextrose 50% Injectable 25 Gram(s) IV Push once  fentaNYL   Patch  12 MICROgram(s)/Hr 1 Patch Transdermal every 72 hours  glucagon  Injectable 1 milliGRAM(s) IntraMuscular once  heparin   Injectable 5000 Unit(s) SubCutaneous every 8 hours  insulin glargine Injectable (LANTUS) 36 Unit(s) SubCutaneous at bedtime  insulin lispro (ADMELOG) corrective regimen sliding scale   SubCutaneous every 6 hours  ipratropium 17 MICROgram(s) HFA Inhaler 1 Puff(s) Inhalation every 6 hours  lactobacillus acidophilus 1 Tablet(s) Oral daily  LORazepam     Tablet 1 milliGRAM(s) Oral every 6 hours  methocarbamol 250 milliGRAM(s) Oral two times a day  pantoprazole  Injectable 40 milliGRAM(s) IV Push daily  piperacillin/tazobactam IVPB.. 3.375 Gram(s) IV Intermittent every 8 hours  senna 1 Tablet(s) Oral daily  sucralfate suspension 1 Gram(s) Enteral Tube four times a day
MEDICATIONS  (STANDING):  aspirin  chewable 81 milliGRAM(s) Oral daily  bisacodyl 5 milliGRAM(s) Oral at bedtime  chlorhexidine 0.12% Liquid 15 milliLiter(s) Oral Mucosa every 12 hours  dextrose 40% Gel 15 Gram(s) Oral once  dextrose 5%. 1000 milliLiter(s) (50 mL/Hr) IV Continuous <Continuous>  dextrose 5%. 1000 milliLiter(s) (100 mL/Hr) IV Continuous <Continuous>  dextrose 50% Injectable 25 Gram(s) IV Push once  dextrose 50% Injectable 12.5 Gram(s) IV Push once  dextrose 50% Injectable 25 Gram(s) IV Push once  fentaNYL   Patch  12 MICROgram(s)/Hr 1 Patch Transdermal every 72 hours  glucagon  Injectable 1 milliGRAM(s) IntraMuscular once  heparin   Injectable 5000 Unit(s) SubCutaneous every 8 hours  insulin glargine Injectable (LANTUS) 36 Unit(s) SubCutaneous at bedtime  insulin lispro (ADMELOG) corrective regimen sliding scale   SubCutaneous every 6 hours  ipratropium 17 MICROgram(s) HFA Inhaler 1 Puff(s) Inhalation every 6 hours  lactated ringers. 1000 milliLiter(s) (80 mL/Hr) IV Continuous <Continuous>  lactobacillus acidophilus 1 Tablet(s) Oral daily  methocarbamol 250 milliGRAM(s) Oral two times a day  pantoprazole  Injectable 40 milliGRAM(s) IV Push daily  piperacillin/tazobactam IVPB.. 3.375 Gram(s) IV Intermittent every 8 hours  senna 1 Tablet(s) Oral daily  sucralfate suspension 1 Gram(s) Enteral Tube four times a day
MEDICATIONS  (STANDING):  aspirin  chewable 81 milliGRAM(s) Oral daily  bisacodyl Suppository 10 milliGRAM(s) Rectal at bedtime  chlorhexidine 0.12% Liquid 15 milliLiter(s) Oral Mucosa every 12 hours  dexMEDEtomidine Infusion 0.2 MICROgram(s)/kG/Hr (3.06 mL/Hr) IV Continuous <Continuous>  dextrose 40% Gel 15 Gram(s) Oral once  dextrose 5%. 1000 milliLiter(s) (100 mL/Hr) IV Continuous <Continuous>  dextrose 5%. 1000 milliLiter(s) (50 mL/Hr) IV Continuous <Continuous>  dextrose 50% Injectable 25 Gram(s) IV Push once  dextrose 50% Injectable 12.5 Gram(s) IV Push once  dextrose 50% Injectable 25 Gram(s) IV Push once  fentaNYL   Patch  12 MICROgram(s)/Hr 1 Patch Transdermal every 72 hours  glucagon  Injectable 1 milliGRAM(s) IntraMuscular once  heparin   Injectable 5000 Unit(s) SubCutaneous every 8 hours  insulin glargine Injectable (LANTUS) 36 Unit(s) SubCutaneous at bedtime  insulin lispro (ADMELOG) corrective regimen sliding scale   SubCutaneous every 6 hours  ipratropium 17 MICROgram(s) HFA Inhaler 1 Puff(s) Inhalation every 6 hours  lactobacillus acidophilus 1 Tablet(s) Oral daily  LORazepam     Tablet 1 milliGRAM(s) Oral every 6 hours  methocarbamol 250 milliGRAM(s) Oral two times a day  pantoprazole  Injectable 40 milliGRAM(s) IV Push daily  piperacillin/tazobactam IVPB.. 3.375 Gram(s) IV Intermittent every 8 hours  senna 1 Tablet(s) Oral daily  sucralfate suspension 1 Gram(s) Enteral Tube four times a day
MEDICATIONS  (STANDING):  aspirin  chewable 81 milliGRAM(s) Oral daily  bisacodyl Suppository 10 milliGRAM(s) Rectal at bedtime  chlorhexidine 0.12% Liquid 15 milliLiter(s) Oral Mucosa every 12 hours  dexMEDEtomidine Infusion 0.2 MICROgram(s)/kG/Hr (3.06 mL/Hr) IV Continuous <Continuous>  dextrose 40% Gel 15 Gram(s) Oral once  dextrose 5%. 1000 milliLiter(s) (100 mL/Hr) IV Continuous <Continuous>  dextrose 5%. 1000 milliLiter(s) (50 mL/Hr) IV Continuous <Continuous>  dextrose 50% Injectable 25 Gram(s) IV Push once  dextrose 50% Injectable 12.5 Gram(s) IV Push once  dextrose 50% Injectable 25 Gram(s) IV Push once  fentaNYL   Patch  12 MICROgram(s)/Hr 1 Patch Transdermal every 72 hours  glucagon  Injectable 1 milliGRAM(s) IntraMuscular once  heparin   Injectable 5000 Unit(s) SubCutaneous every 8 hours  insulin glargine Injectable (LANTUS) 36 Unit(s) SubCutaneous at bedtime  insulin lispro (ADMELOG) corrective regimen sliding scale   SubCutaneous every 6 hours  ipratropium 17 MICROgram(s) HFA Inhaler 1 Puff(s) Inhalation every 6 hours  lactobacillus acidophilus 1 Tablet(s) Oral daily  LORazepam     Tablet 1 milliGRAM(s) Oral every 6 hours  methocarbamol 250 milliGRAM(s) Oral two times a day  pantoprazole  Injectable 40 milliGRAM(s) IV Push daily  piperacillin/tazobactam IVPB.. 3.375 Gram(s) IV Intermittent every 8 hours  senna 1 Tablet(s) Oral daily  sucralfate suspension 1 Gram(s) Enteral Tube four times a day
MEDICATIONS  (STANDING):  aspirin  chewable 81 milliGRAM(s) Oral daily  bisacodyl 5 milliGRAM(s) Oral at bedtime  chlorhexidine 0.12% Liquid 15 milliLiter(s) Oral Mucosa every 12 hours  dextrose 40% Gel 15 Gram(s) Oral once  dextrose 5%. 1000 milliLiter(s) (50 mL/Hr) IV Continuous <Continuous>  dextrose 5%. 1000 milliLiter(s) (100 mL/Hr) IV Continuous <Continuous>  dextrose 50% Injectable 25 Gram(s) IV Push once  dextrose 50% Injectable 12.5 Gram(s) IV Push once  dextrose 50% Injectable 25 Gram(s) IV Push once  fentaNYL   Patch  12 MICROgram(s)/Hr 1 Patch Transdermal every 72 hours  glucagon  Injectable 1 milliGRAM(s) IntraMuscular once  heparin   Injectable 5000 Unit(s) SubCutaneous every 8 hours  insulin glargine Injectable (LANTUS) 36 Unit(s) SubCutaneous at bedtime  insulin lispro (ADMELOG) corrective regimen sliding scale   SubCutaneous every 6 hours  ipratropium 17 MICROgram(s) HFA Inhaler 1 Puff(s) Inhalation every 6 hours  lactated ringers. 1000 milliLiter(s) (80 mL/Hr) IV Continuous <Continuous>  lactobacillus acidophilus 1 Tablet(s) Oral daily  methocarbamol 250 milliGRAM(s) Oral two times a day  pantoprazole  Injectable 40 milliGRAM(s) IV Push daily  piperacillin/tazobactam IVPB.. 3.375 Gram(s) IV Intermittent every 8 hours  senna 1 Tablet(s) Oral daily  sucralfate suspension 1 Gram(s) Enteral Tube four times a day
MEDICATIONS  (STANDING):  aspirin  chewable 81 milliGRAM(s) Oral daily  bisacodyl 5 milliGRAM(s) Oral at bedtime  chlorhexidine 0.12% Liquid 15 milliLiter(s) Oral Mucosa every 12 hours  dextrose 40% Gel 15 Gram(s) Oral once  dextrose 5%. 1000 milliLiter(s) (50 mL/Hr) IV Continuous <Continuous>  dextrose 5%. 1000 milliLiter(s) (100 mL/Hr) IV Continuous <Continuous>  dextrose 50% Injectable 25 Gram(s) IV Push once  dextrose 50% Injectable 12.5 Gram(s) IV Push once  dextrose 50% Injectable 25 Gram(s) IV Push once  fentaNYL   Patch  12 MICROgram(s)/Hr 1 Patch Transdermal every 72 hours  glucagon  Injectable 1 milliGRAM(s) IntraMuscular once  heparin   Injectable 5000 Unit(s) SubCutaneous every 8 hours  insulin glargine Injectable (LANTUS) 36 Unit(s) SubCutaneous at bedtime  insulin lispro (ADMELOG) corrective regimen sliding scale   SubCutaneous every 6 hours  ipratropium 17 MICROgram(s) HFA Inhaler 1 Puff(s) Inhalation every 6 hours  lactated ringers. 1000 milliLiter(s) (80 mL/Hr) IV Continuous <Continuous>  lactobacillus acidophilus 1 Tablet(s) Oral daily  methocarbamol 250 milliGRAM(s) Oral two times a day  pantoprazole  Injectable 40 milliGRAM(s) IV Push daily  piperacillin/tazobactam IVPB.. 3.375 Gram(s) IV Intermittent every 8 hours  senna 1 Tablet(s) Oral daily  sucralfate suspension 1 Gram(s) Enteral Tube four times a day
MEDICATIONS  (STANDING):  aspirin  chewable 81 milliGRAM(s) Oral daily  bisacodyl Suppository 10 milliGRAM(s) Rectal at bedtime  chlorhexidine 0.12% Liquid 15 milliLiter(s) Oral Mucosa every 12 hours  dexMEDEtomidine Infusion 0.2 MICROgram(s)/kG/Hr (3.06 mL/Hr) IV Continuous <Continuous>  dextrose 40% Gel 15 Gram(s) Oral once  dextrose 5%. 1000 milliLiter(s) (100 mL/Hr) IV Continuous <Continuous>  dextrose 5%. 1000 milliLiter(s) (50 mL/Hr) IV Continuous <Continuous>  dextrose 50% Injectable 25 Gram(s) IV Push once  dextrose 50% Injectable 12.5 Gram(s) IV Push once  dextrose 50% Injectable 25 Gram(s) IV Push once  fentaNYL   Patch  12 MICROgram(s)/Hr 1 Patch Transdermal every 72 hours  glucagon  Injectable 1 milliGRAM(s) IntraMuscular once  heparin   Injectable 5000 Unit(s) SubCutaneous every 8 hours  insulin glargine Injectable (LANTUS) 36 Unit(s) SubCutaneous at bedtime  insulin lispro (ADMELOG) corrective regimen sliding scale   SubCutaneous every 6 hours  ipratropium 17 MICROgram(s) HFA Inhaler 1 Puff(s) Inhalation every 6 hours  lactobacillus acidophilus 1 Tablet(s) Oral daily  LORazepam     Tablet 1 milliGRAM(s) Oral every 6 hours  methocarbamol 250 milliGRAM(s) Oral two times a day  pantoprazole  Injectable 40 milliGRAM(s) IV Push daily  piperacillin/tazobactam IVPB.. 3.375 Gram(s) IV Intermittent every 8 hours  senna 1 Tablet(s) Oral daily  sucralfate suspension 1 Gram(s) Enteral Tube four times a day
MEDICATIONS  (STANDING):  aspirin  chewable 81 milliGRAM(s) Oral daily  bisacodyl Suppository 10 milliGRAM(s) Rectal at bedtime  chlorhexidine 0.12% Liquid 15 milliLiter(s) Oral Mucosa every 12 hours  dexMEDEtomidine Infusion 0.2 MICROgram(s)/kG/Hr (3.06 mL/Hr) IV Continuous <Continuous>  dextrose 40% Gel 15 Gram(s) Oral once  dextrose 5%. 1000 milliLiter(s) (100 mL/Hr) IV Continuous <Continuous>  dextrose 5%. 1000 milliLiter(s) (50 mL/Hr) IV Continuous <Continuous>  dextrose 50% Injectable 25 Gram(s) IV Push once  dextrose 50% Injectable 12.5 Gram(s) IV Push once  dextrose 50% Injectable 25 Gram(s) IV Push once  fentaNYL   Patch  12 MICROgram(s)/Hr 1 Patch Transdermal every 72 hours  glucagon  Injectable 1 milliGRAM(s) IntraMuscular once  heparin   Injectable 5000 Unit(s) SubCutaneous every 8 hours  insulin glargine Injectable (LANTUS) 36 Unit(s) SubCutaneous at bedtime  insulin lispro (ADMELOG) corrective regimen sliding scale   SubCutaneous every 6 hours  ipratropium 17 MICROgram(s) HFA Inhaler 1 Puff(s) Inhalation every 6 hours  lactobacillus acidophilus 1 Tablet(s) Oral daily  LORazepam     Tablet 1 milliGRAM(s) Oral every 6 hours  methocarbamol 250 milliGRAM(s) Oral two times a day  pantoprazole  Injectable 40 milliGRAM(s) IV Push daily  piperacillin/tazobactam IVPB.. 3.375 Gram(s) IV Intermittent every 8 hours  senna 1 Tablet(s) Oral daily  sucralfate suspension 1 Gram(s) Enteral Tube four times a day

## 2021-10-04 NOTE — PROGRESS NOTE ADULT - ASSESSMENT
Patient is a 75 year old female with PMH of HTN, DM type 2, CVA, recent Cardiac Arrest, Chronic Respiratory Failure, Vent Dependant s/p trach & PEG, Anemia with GI blood loss, and Dementia who was sent from [facility] facility for acute respiratory distress  Sepsis due to recurrent ventilator associated pneumonia vs aspiration pneumonia, possible UTI    - fever 100.6F yesterday, now afebrile. Leukocytosis normalized. Sepsis resolved.    - trach to vent, FiO2 50%.     - CT imaging reviewed, new dense b/l lung consolidations, mediastinal lad, small b/l effusions and atelectasis,  recent adm with PNA with Serratia marcescens and Pseudomonas aeruginosa in sputum    - RVP/COVID and legionella urine ag negative    - UA with pyuria, has vaughn in place with yellow urine; prior cx reviewed colonized with Proteus    Recommendations:   PNA-  based on prior micro and clinical response so far    - s/p vancomycin and pip-tazo,    - 9/28 noted Serratia marcescens and Pseudomonas aeruginosa and now stenotrophomonas in sputum    not suggested to add any coverage for steno   Acute on chronic respiratory distress due to above  RYAN, nephrology following, pt seems much improved from admission, currently afebrile with decrease in pulm secretions, minimal vent support and discussed with nursing - pt stable and improved  spon pip-tazo (started late 9/25)   can look at dc off abx   -pt remains in [facility] on ventilatory support    From an ID standpoint no further requirement for inpatient status for the management of ID issues. Fine with discharge from ID standpoint when other medical issues no longer require inpatient care and social issues allow for a safe discharge plan. To schedule an outpatient ID follow up appointment please call our office at [phone]      We will follow along in the care of this patient. Please call us at [phone] or text me directly on my cell# at [phone] with any concerns.

## 2021-10-04 NOTE — DISCHARGE NOTE PROVIDER - NSDCMRMEDTOKEN_GEN_ALL_CORE_FT
acetaminophen 160 mg/5 mL oral suspension: 20.31 milliliter(s) by gastrostomy tube every 6 hours, As Needed  albuterol 90 mcg/inh inhalation aerosol: 2 puff(s) inhaled every 6 hours  aspirin 81 mg oral tablet, chewable: 1 tab(s) by PEG tube once a day  Bacid (LAC) oral tablet: 2 tab(s) by gastrostomy tube once a day  bisacodyl 5 mg oral delayed release tablet: 1 tab(s) orally once a day (at bedtime)  Carafate 1 g/10 mL oral suspension: 10 milliliter(s) by gastrostomy tube 4 times a day (before meals and at bedtime) for 14 days (Started 6/4/21)  chlorhexidine 0.12% mucous membrane liquid: 15 milliliter(s) mucous membrane 2 times a day  insulin glargine: 36 unit(s) subcutaneous once a day (at bedtime)  ipratropium-albuterol 0.5 mg-2.5 mg/3 mL inhalation solution: 3 milliliter(s) inhaled 4 times a day  LORazepam 0.5 mg oral tablet: 1 tab(s) orally every 2 hours, As needed, Agitation  LORazepam 1 mg oral tablet: 1 tab(s) orally every 6 hours  methocarbamol: 250 milligram(s) by gastrostomy tube 2 times a day  pantoprazole 40 mg oral granule, delayed release: 40 milligram(s) by gastrostomy tube 2 times a day  polyethylene glycol 3350 oral powder for reconstitution: 17 gram(s) orally every 12 hours  senna 8.6 mg oral tablet: 1 tab(s) by gastrostomy tube once a day (at bedtime)  simethicone 80 mg oral tablet, chewable: 1 tab(s) orally every 6 hours   acetaminophen 160 mg/5 mL oral suspension: 20.31 milliliter(s) by gastrostomy tube every 6 hours, As Needed  albuterol 90 mcg/inh inhalation aerosol: 2 puff(s) inhaled every 6 hours  aspirin 81 mg oral tablet, chewable: 1 tab(s) by PEG tube once a day  Bacid (LAC) oral tablet: 2 tab(s) by gastrostomy tube once a day  bisacodyl 5 mg oral delayed release tablet: 1 tab(s) orally once a day (at bedtime)  Carafate 1 g/10 mL oral suspension: 10 milliliter(s) by gastrostomy tube 4 times a day (before meals and at bedtime) for 14 days (Started 6/4/21)  chlorhexidine 0.12% mucous membrane liquid: 15 milliliter(s) mucous membrane 2 times a day  insulin glargine: 36 unit(s) subcutaneous once a day (at bedtime)  insulin lispro 100 units/mL injectable solution:  injectable   ipratropium-albuterol 0.5 mg-2.5 mg/3 mL inhalation solution: 3 milliliter(s) inhaled 4 times a day  LORazepam 0.5 mg oral tablet: 1 tab(s) orally every 2 hours, As needed, Agitation  methocarbamol: 250 milligram(s) by gastrostomy tube 2 times a day  pantoprazole 40 mg oral granule, delayed release: 40 milligram(s) by gastrostomy tube 2 times a day  polyethylene glycol 3350 oral powder for reconstitution: 17 gram(s) orally every 12 hours  senna 8.6 mg oral tablet: 1 tab(s) by gastrostomy tube once a day (at bedtime)  simethicone 80 mg oral tablet, chewable: 1 tab(s) orally every 6 hours

## 2021-10-04 NOTE — PROGRESS NOTE ADULT - SUBJECTIVE AND OBJECTIVE BOX
BREA BECKHAM     [facility] 04    Allergies    codeine (Hives)    Intolerances        PAST MEDICAL & SURGICAL HISTORY:  Dementia of frontal lobe type    Aphasic stroke    Diabetes mellitus    Respiratory failure    Hypertension    GERD (gastroesophageal reflux disease)    Constipation    Respiratory failure    CVA (cerebral vascular accident)    HTN (hypertension)    DM (diabetes mellitus)    Advanced dementia    COVID-19 virus detected    Quadriplegia    Pneumonia    Type II diabetes mellitus    Hx of appendectomy    Gastrostomy in place    Tracheostomy in place    Tracheostomy tube present    Feeding by G-tube        FAMILY HISTORY:      Home Medications:  acetaminophen 160 mg/5 mL oral suspension: 20.31 milliliter(s) by gastrostomy tube every 6 hours, As Needed (23 Jun 2021 09:08)  albuterol 90 mcg/inh inhalation aerosol: 2 puff(s) inhaled every 6 hours (23 Jun 2021 09:08)  aspirin 81 mg oral tablet, chewable: 1 tab(s) by PEG tube once a day (15 Zay 2021 15:07)  Bacid (LAC) oral tablet: 2 tab(s) by gastrostomy tube once a day (23 Jun 2021 09:08)  bisacodyl 5 mg oral delayed release tablet: 1 tab(s) orally once a day (at bedtime) (23 Jun 2021 09:08)  Carafate 1 g/10 mL oral suspension: 10 milliliter(s) by gastrostomy tube 4 times a day (before meals and at bedtime) for 14 days (Started 6/4/21) (15 Zay 2021 15:07)  chlorhexidine 0.12% mucous membrane liquid: 15 milliliter(s) mucous membrane 2 times a day (15 Sep 2021 12:08)  insulin glargine: 36 unit(s) subcutaneous once a day (at bedtime) (15 Sep 2021 12:08)  ipratropium-albuterol 0.5 mg-2.5 mg/3 mL inhalation solution: 3 milliliter(s) inhaled 4 times a day (15 Zay 2021 15:07)  LORazepam 0.5 mg oral tablet: 1 tab(s) orally every 2 hours, As needed, Agitation (15 Sep 2021 12:08)  LORazepam 1 mg oral tablet: 1 tab(s) orally every 6 hours (15 Sep 2021 12:08)  methocarbamol: 250 milligram(s) by gastrostomy tube 2 times a day (15 Sep 2021 12:08)  pantoprazole 40 mg oral granule, delayed release: 40 milligram(s) by gastrostomy tube 2 times a day (15 Sep 2021 12:08)  polyethylene glycol 3350 oral powder for reconstitution: 17 gram(s) orally every 12 hours (23 Jun 2021 09:08)  senna 8.6 mg oral tablet: 1 tab(s) by gastrostomy tube once a day (at bedtime) (23 Jun 2021 09:08)  simethicone 80 mg oral tablet, chewable: 1 tab(s) orally every 6 hours (15 Sep 2021 12:08)      MEDICATIONS  (STANDING):  aspirin  chewable 81 milliGRAM(s) Oral daily  bisacodyl Suppository 10 milliGRAM(s) Rectal at bedtime  chlorhexidine 0.12% Liquid 15 milliLiter(s) Oral Mucosa every 12 hours  dextrose 40% Gel 15 Gram(s) Oral once  dextrose 5%. 1000 milliLiter(s) (50 mL/Hr) IV Continuous <Continuous>  dextrose 5%. 1000 milliLiter(s) (100 mL/Hr) IV Continuous <Continuous>  dextrose 50% Injectable 25 Gram(s) IV Push once  dextrose 50% Injectable 12.5 Gram(s) IV Push once  dextrose 50% Injectable 25 Gram(s) IV Push once  glucagon  Injectable 1 milliGRAM(s) IntraMuscular once  heparin   Injectable 5000 Unit(s) SubCutaneous every 8 hours  insulin glargine Injectable (LANTUS) 36 Unit(s) SubCutaneous at bedtime  insulin lispro (ADMELOG) corrective regimen sliding scale   SubCutaneous every 6 hours  ipratropium 17 MICROgram(s) HFA Inhaler 1 Puff(s) Inhalation every 6 hours  lactobacillus acidophilus 1 Tablet(s) Oral daily  LORazepam     Tablet 1 milliGRAM(s) Oral every 6 hours  methocarbamol 250 milliGRAM(s) Oral two times a day  pantoprazole  Injectable 40 milliGRAM(s) IV Push daily  povidone iodine 10% Solution 1 Application(s) Topical daily  senna 1 Tablet(s) Oral daily  sucralfate suspension 1 Gram(s) Enteral Tube four times a day    MEDICATIONS  (PRN):  ALBUTerol    90 MICROgram(s) HFA Inhaler 1 Puff(s) Inhalation every 6 hours PRN Shortness of Breath and/or Wheezing  LORazepam   Injectable 1 milliGRAM(s) IV Push every 6 hours PRN agtation and vent dyssynchrony  polyethylene glycol 3350 17 Gram(s) Oral every 12 hours PRN Constipation  simethicone 80 milliGRAM(s) Chew two times a day PRN Dyspepsia      Diet, NPO with Tube Feed:   Tube Feeding Modality: Gastrostomy  Jevity 1.5 Horacio  Total Volume for 24 Hours (mL): 1000  Continuous  Starting Tube Feed Rate mL per Hour: 40  Increase Tube Feed Rate by (mL): 10     Every 6 hours  Until Goal Tube Feed Rate (mL per Hour): 50  Tube Feed Duration (in Hours): 20  Tube Feed Start Time: 12:00  Tube Feed Stop Time: 08:00  Free Water Flush  Pump   Rate (mL per Hour): 25   Frequency: Every Hour    Duration (Hours): 20    Start Time: 12:00    Stop Time: 08:00  No Carb Prosource (1pkg = 15gms Protein)     Qty per Day:  2 (10-02-21 @ 13:37) [Pending Verification By Attending]  Diet, NPO with Tube Feed:   Tube Feeding Modality: Gastrostomy  Jevity 1.5 Horacio  Total Volume for 24 Hours (mL): 1000  Continuous  Starting Tube Feed Rate mL per Hour: 40  Increase Tube Feed Rate by (mL): 10     Every 6 hours  Until Goal Tube Feed Rate (mL per Hour): 50  Tube Feed Duration (in Hours): 20  Tube Feed Start Time: 12:00  Tube Feed Stop Time: 08:00 (10-02-21 @ 13:37) [Pending Verification By Attending]  Diet, NPO with Tube Feed:   Tube Feeding Modality: Gastrostomy  Jevity 1.5 Horacio  Total Volume for 24 Hours (mL): 720  Continuous  Starting Tube Feed Rate mL per Hour: 30  Until Goal Tube Feed Rate (mL per Hour): 30  Tube Feed Duration (in Hours): 24  Tube Feed Start Time: 19:00 (09-26-21 @ 18:08) [Active]          Vital Signs Last 24 Hrs  T(C): 36.7 (04 Oct 2021 08:30), Max: 37.2 (03 Oct 2021 16:00)  T(F): 98 (04 Oct 2021 08:30), Max: 99 (03 Oct 2021 16:00)  HR: 81 (04 Oct 2021 08:00) (76 - 107)  BP: 143/70 (04 Oct 2021 08:00) (123/55 - 146/82)  BP(mean): 90 (04 Oct 2021 08:00) (76 - 98)  RR: 19 (04 Oct 2021 08:00) (17 - 31)  SpO2: 100% (04 Oct 2021 08:00) (97% - 100%)      10-03-21 @ 07:01  -  10-04-21 @ 07:00  --------------------------------------------------------  IN: 760 mL / OUT: 1600 mL / NET: -840 mL        Mode: PRVC, RR (machine): 18, TV (machine): 400, FiO2: 30, PEEP: 5, ITime: 1.15, MAP: 17, PIP: 29      LABS:                        8.4    6.86  )-----------( 382      ( 03 Oct 2021 07:22 )             28.4     10-03    141  |  107  |  11  ----------------------------<  104<H>  4.4   |  26  |  0.90    Ca    8.2<L>      03 Oct 2021 07:22    TPro  5.9<L>  /  Alb  2.0<L>  /  TBili  0.4  /  DBili  x   /  AST  14  /  ALT  19  /  AlkPhos  104  10-03              WBC:  WBC Count: 6.86 K/uL (10-03 @ 07:22)  WBC Count: 9.05 K/uL (10-01 @ 06:56)      MICROBIOLOGY:  RECENT CULTURES:                  Sodium:  Sodium, Serum: 141 mmol/L (10-03 @ 07:22)  Sodium, Serum: 138 mmol/L (10-01 @ 06:56)      0.90 mg/dL 10-03 @ 07:22  1.02 mg/dL 10-01 @ 06:56      Hemoglobin:  Hemoglobin: 8.4 g/dL (10-03 @ 07:22)  Hemoglobin: 8.7 g/dL (10-01 @ 06:56)      Platelets: Platelet Count - Automated: 382 K/uL (10-03 @ 07:22)  Platelet Count - Automated: 437 K/uL (10-01 @ 06:56)      LIVER FUNCTIONS - ( 03 Oct 2021 07:22 )  Alb: 2.0 g/dL / Pro: 5.9 g/dL / ALK PHOS: 104 U/L / ALT: 19 U/L DA / AST: 14 U/L / GGT: x                 RADIOLOGY & ADDITIONAL STUDIES:      MICROBIOLOGY:  RECENT CULTURES:

## 2021-10-30 NOTE — ED ADULT NURSE NOTE - CHPI ED NUR SYMPTOMS NEG
Patient: Constanza Coles    Procedure: Procedure(s):  ESOPHAGOGASTRODUODENOSCOPY (EGD)       Diagnosis: Gastrointestinal hemorrhage, unspecified gastrointestinal hemorrhage type [K92.2]  Diagnosis Additional Information: No value filed.    Anesthesia Type:   MAC     Note:    Oropharynx: oropharynx clear of all foreign objects  Level of Consciousness: awake  Oxygen Supplementation: room air    Independent Airway: airway patency satisfactory and stable  Dentition: dentition unchanged  Vital Signs Stable: post-procedure vital signs reviewed and stable  Report to RN Given: handoff report given  Patient transferred to: Medical/Surgical Unit    Handoff Report: Identifed the Patient, Identified the Reponsible Provider, Reviewed the pertinent medical history, Discussed the surgical course, Reviewed Intra-OP anesthesia mangement and issues during anesthesia, Set expectations for post-procedure period and Allowed opportunity for questions and acknowledgement of understanding      Vitals:  Vitals Value Taken Time   /65 10/30/21 1119   Temp 36.4  C (97.5  F) 10/30/21 1119   Pulse 77 10/30/21 1119   Resp 16 10/30/21 1119   SpO2 96 % 10/30/21 1119       Electronically Signed By: ALMAS FREGOSO CRNA  October 30, 2021  11:21 AM   no chills/no bleeding gums/no loss of consciousness/no nausea/no vomiting

## 2021-11-06 ENCOUNTER — INPATIENT (INPATIENT)
Facility: HOSPITAL | Age: 76
LOS: 4 days | Discharge: NURSING, REHAB OR LONG-TERM CARE | DRG: 682 | End: 2021-11-11
Attending: HOSPITALIST | Admitting: HOSPITALIST
Payer: MEDICARE

## 2021-11-06 VITALS
TEMPERATURE: 98 F | HEART RATE: 72 BPM | DIASTOLIC BLOOD PRESSURE: 60 MMHG | WEIGHT: 124.34 LBS | SYSTOLIC BLOOD PRESSURE: 97 MMHG | OXYGEN SATURATION: 100 % | HEIGHT: 65 IN | RESPIRATION RATE: 18 BRPM

## 2021-11-06 DIAGNOSIS — Z93.0 TRACHEOSTOMY STATUS: Chronic | ICD-10-CM

## 2021-11-06 DIAGNOSIS — Z93.1 GASTROSTOMY STATUS: Chronic | ICD-10-CM

## 2021-11-06 LAB
ALBUMIN SERPL ELPH-MCNC: 2.5 G/DL — LOW (ref 3.3–5)
ALP SERPL-CCNC: 119 U/L — SIGNIFICANT CHANGE UP (ref 30–120)
ALT FLD-CCNC: 42 U/L DA — SIGNIFICANT CHANGE UP (ref 10–60)
ANION GAP SERPL CALC-SCNC: 12 MMOL/L — SIGNIFICANT CHANGE UP (ref 5–17)
APPEARANCE UR: ABNORMAL
APTT BLD: 42.3 SEC — HIGH (ref 27.5–35.5)
AST SERPL-CCNC: 133 U/L — HIGH (ref 10–40)
BACTERIA # UR AUTO: ABNORMAL
BASOPHILS # BLD AUTO: 0.02 K/UL — SIGNIFICANT CHANGE UP (ref 0–0.2)
BASOPHILS NFR BLD AUTO: 0.1 % — SIGNIFICANT CHANGE UP (ref 0–2)
BILIRUB SERPL-MCNC: 0.6 MG/DL — SIGNIFICANT CHANGE UP (ref 0.2–1.2)
BILIRUB UR-MCNC: NEGATIVE — SIGNIFICANT CHANGE UP
BUN SERPL-MCNC: 104 MG/DL — HIGH (ref 7–23)
CALCIUM SERPL-MCNC: 9.1 MG/DL — SIGNIFICANT CHANGE UP (ref 8.4–10.5)
CHLORIDE SERPL-SCNC: 92 MMOL/L — LOW (ref 96–108)
CO2 SERPL-SCNC: 35 MMOL/L — HIGH (ref 22–31)
COD CRY URNS QL: ABNORMAL
COLOR SPEC: YELLOW — SIGNIFICANT CHANGE UP
COMMENT - URINE: SIGNIFICANT CHANGE UP
CREAT SERPL-MCNC: 2.45 MG/DL — HIGH (ref 0.5–1.3)
DIFF PNL FLD: ABNORMAL
EOSINOPHIL # BLD AUTO: 0.09 K/UL — SIGNIFICANT CHANGE UP (ref 0–0.5)
EOSINOPHIL NFR BLD AUTO: 0.7 % — SIGNIFICANT CHANGE UP (ref 0–6)
EPI CELLS # UR: SIGNIFICANT CHANGE UP
GLUCOSE SERPL-MCNC: 88 MG/DL — SIGNIFICANT CHANGE UP (ref 70–99)
GLUCOSE UR QL: NEGATIVE MG/DL — SIGNIFICANT CHANGE UP
HCT VFR BLD CALC: 30.8 % — LOW (ref 34.5–45)
HGB BLD-MCNC: 9.6 G/DL — LOW (ref 11.5–15.5)
IMM GRANULOCYTES NFR BLD AUTO: 0.4 % — SIGNIFICANT CHANGE UP (ref 0–1.5)
INR BLD: 1.16 RATIO — SIGNIFICANT CHANGE UP (ref 0.88–1.16)
KETONES UR-MCNC: ABNORMAL
LACTATE SERPL-SCNC: 3.3 MMOL/L — HIGH (ref 0.7–2)
LEUKOCYTE ESTERASE UR-ACNC: ABNORMAL
LYMPHOCYTES # BLD AUTO: 15.9 % — SIGNIFICANT CHANGE UP (ref 13–44)
LYMPHOCYTES # BLD AUTO: 2.19 K/UL — SIGNIFICANT CHANGE UP (ref 1–3.3)
MCHC RBC-ENTMCNC: 26.7 PG — LOW (ref 27–34)
MCHC RBC-ENTMCNC: 31.2 GM/DL — LOW (ref 32–36)
MCV RBC AUTO: 85.6 FL — SIGNIFICANT CHANGE UP (ref 80–100)
MONOCYTES # BLD AUTO: 0.81 K/UL — SIGNIFICANT CHANGE UP (ref 0–0.9)
MONOCYTES NFR BLD AUTO: 5.9 % — SIGNIFICANT CHANGE UP (ref 2–14)
NEUTROPHILS # BLD AUTO: 10.6 K/UL — HIGH (ref 1.8–7.4)
NEUTROPHILS NFR BLD AUTO: 77 % — SIGNIFICANT CHANGE UP (ref 43–77)
NITRITE UR-MCNC: NEGATIVE — SIGNIFICANT CHANGE UP
NRBC # BLD: 0 /100 WBCS — SIGNIFICANT CHANGE UP (ref 0–0)
PH UR: 6.5 — SIGNIFICANT CHANGE UP (ref 5–8)
PLATELET # BLD AUTO: 238 K/UL — SIGNIFICANT CHANGE UP (ref 150–400)
POTASSIUM SERPL-MCNC: 3.1 MMOL/L — LOW (ref 3.5–5.3)
POTASSIUM SERPL-SCNC: 3.1 MMOL/L — LOW (ref 3.5–5.3)
PROT SERPL-MCNC: 7.8 G/DL — SIGNIFICANT CHANGE UP (ref 6–8.3)
PROT UR-MCNC: 100 MG/DL
PROTHROM AB SERPL-ACNC: 13.9 SEC — HIGH (ref 10.6–13.6)
RBC # BLD: 3.6 M/UL — LOW (ref 3.8–5.2)
RBC # FLD: 16.6 % — HIGH (ref 10.3–14.5)
RBC CASTS # UR COMP ASSIST: ABNORMAL /HPF (ref 0–4)
SODIUM SERPL-SCNC: 139 MMOL/L — SIGNIFICANT CHANGE UP (ref 135–145)
SP GR SPEC: 1.02 — SIGNIFICANT CHANGE UP (ref 1.01–1.02)
UROBILINOGEN FLD QL: 4 MG/DL
WBC # BLD: 13.77 K/UL — HIGH (ref 3.8–10.5)
WBC # FLD AUTO: 13.77 K/UL — HIGH (ref 3.8–10.5)
WBC UR QL: ABNORMAL

## 2021-11-06 PROCEDURE — 71045 X-RAY EXAM CHEST 1 VIEW: CPT | Mod: 26

## 2021-11-06 PROCEDURE — 93010 ELECTROCARDIOGRAM REPORT: CPT

## 2021-11-06 PROCEDURE — 99285 EMERGENCY DEPT VISIT HI MDM: CPT

## 2021-11-06 RX ORDER — CHLORHEXIDINE GLUCONATE 213 G/1000ML
15 SOLUTION TOPICAL EVERY 12 HOURS
Refills: 0 | Status: DISCONTINUED | OUTPATIENT
Start: 2021-11-06 | End: 2021-11-09

## 2021-11-06 RX ORDER — SODIUM CHLORIDE 9 MG/ML
1800 INJECTION INTRAMUSCULAR; INTRAVENOUS; SUBCUTANEOUS ONCE
Refills: 0 | Status: COMPLETED | OUTPATIENT
Start: 2021-11-06 | End: 2021-11-06

## 2021-11-06 RX ADMIN — SODIUM CHLORIDE 1800 MILLILITER(S): 9 INJECTION INTRAMUSCULAR; INTRAVENOUS; SUBCUTANEOUS at 22:55

## 2021-11-06 NOTE — ED PROVIDER NOTE - OBJECTIVE STATEMENT
[facility] sent from [facility] with elevated BUN. Patient unable to contribute to history. Had labs drawn this afternoon and reportedly has a BUN of 100. Patient does not have a vaughn catheter. No report of fever, vomiting, decreased urine output

## 2021-11-06 NOTE — ED ADULT TRIAGE NOTE - CHIEF COMPLAINT QUOTE
Sent from [facility] for abnormal labs, elevated BUN. Trach to Vent dependant 400/18/100%, Peep 5. Non verbal. (+) Peg tube.

## 2021-11-06 NOTE — ED ADULT NURSE NOTE - NSIMPLEMENTINTERV_GEN_ALL_ED
Implemented All Fall with Harm Risk Interventions:  [place] to call system. Call bell, personal items and telephone within reach. Instruct patient to call for assistance. Room bathroom lighting operational. Non-slip footwear when patient is off stretcher. Physically safe environment: no spills, clutter or unnecessary equipment. Stretcher in lowest position, wheels locked, appropriate side rails in place. Provide visual cue, wrist band, yellow gown, etc. Monitor gait and stability. Monitor for mental status changes and reorient to person, place, and time. Review medications for side effects contributing to fall risk. Reinforce activity limits and safety measures with patient and family. Provide visual clues: red socks.

## 2021-11-06 NOTE — ED ADULT NURSE NOTE - OBJECTIVE STATEMENT
pt to ED BIBA from [facility] nursing [place]. as per EMS pt sent for abnormal lab work; elevated BUN. pt is awake. trach to vent. also noted to have midline IV to LUE as well as a PEG tube. pt with multiple contractures to all extremities

## 2021-11-06 NOTE — ED ADULT NURSE NOTE - CAS EDP DISCH DISPOSITION ADMI
----- Message from Wanda Glaser sent at 8/28/2019  1:33 PM CDT -----  Contact: Jordan Beard (Grandchild)  Type:  Test Results    Who Called:  Jordan Beard (Grandchild)  Name of Test (Lab/Mammo/Etc):  Lab  Date of Test:  8/24/19  Ordering Provider:  Mirtha Palmer NP  Where the test was performed:  [facility]  Best Call Back Number:  Jordan at   Additional Information:  Calling to find out if the lab results are back yet. She also would like records from July 2019- now faxed over to Dr Jonna Lezama at      ICU

## 2021-11-06 NOTE — ED ADULT NURSE NOTE - CHIEF COMPLAINT QUOTE
Sent from [facility] for abnormal labs, elevated BUN. Trach to Vent dependant 400/18/100%, Peep 5. Non verbal. (+) Peg tube.

## 2021-11-07 DIAGNOSIS — N17.9 ACUTE KIDNEY FAILURE, UNSPECIFIED: ICD-10-CM

## 2021-11-07 LAB
ALBUMIN SERPL ELPH-MCNC: 2.1 G/DL — LOW (ref 3.3–5)
ALP SERPL-CCNC: 87 U/L — SIGNIFICANT CHANGE UP (ref 30–120)
ALT FLD-CCNC: 42 U/L DA — SIGNIFICANT CHANGE UP (ref 10–60)
ANION GAP SERPL CALC-SCNC: 10 MMOL/L — SIGNIFICANT CHANGE UP (ref 5–17)
ANION GAP SERPL CALC-SCNC: 8 MMOL/L — SIGNIFICANT CHANGE UP (ref 5–17)
AST SERPL-CCNC: 141 U/L — HIGH (ref 10–40)
BASOPHILS # BLD AUTO: 0.02 K/UL — SIGNIFICANT CHANGE UP (ref 0–0.2)
BASOPHILS NFR BLD AUTO: 0.2 % — SIGNIFICANT CHANGE UP (ref 0–2)
BILIRUB SERPL-MCNC: 0.6 MG/DL — SIGNIFICANT CHANGE UP (ref 0.2–1.2)
BUN SERPL-MCNC: 81 MG/DL — HIGH (ref 7–23)
BUN SERPL-MCNC: 86 MG/DL — HIGH (ref 7–23)
CALCIUM SERPL-MCNC: 8.1 MG/DL — LOW (ref 8.4–10.5)
CALCIUM SERPL-MCNC: 8.3 MG/DL — LOW (ref 8.4–10.5)
CHLORIDE SERPL-SCNC: 101 MMOL/L — SIGNIFICANT CHANGE UP (ref 96–108)
CHLORIDE SERPL-SCNC: 102 MMOL/L — SIGNIFICANT CHANGE UP (ref 96–108)
CO2 SERPL-SCNC: 28 MMOL/L — SIGNIFICANT CHANGE UP (ref 22–31)
CO2 SERPL-SCNC: 30 MMOL/L — SIGNIFICANT CHANGE UP (ref 22–31)
CREAT SERPL-MCNC: 1.68 MG/DL — HIGH (ref 0.5–1.3)
CREAT SERPL-MCNC: 1.85 MG/DL — HIGH (ref 0.5–1.3)
EOSINOPHIL # BLD AUTO: 0.09 K/UL — SIGNIFICANT CHANGE UP (ref 0–0.5)
EOSINOPHIL NFR BLD AUTO: 0.8 % — SIGNIFICANT CHANGE UP (ref 0–6)
GLUCOSE SERPL-MCNC: 28 MG/DL — CRITICAL LOW (ref 70–99)
GLUCOSE SERPL-MCNC: 60 MG/DL — LOW (ref 70–99)
HCT VFR BLD CALC: 28.8 % — LOW (ref 34.5–45)
HGB BLD-MCNC: 8.8 G/DL — LOW (ref 11.5–15.5)
IMM GRANULOCYTES NFR BLD AUTO: 0.3 % — SIGNIFICANT CHANGE UP (ref 0–1.5)
LACTATE SERPL-SCNC: 1.6 MMOL/L — SIGNIFICANT CHANGE UP (ref 0.7–2)
LYMPHOCYTES # BLD AUTO: 2.16 K/UL — SIGNIFICANT CHANGE UP (ref 1–3.3)
LYMPHOCYTES # BLD AUTO: 20.4 % — SIGNIFICANT CHANGE UP (ref 13–44)
MAGNESIUM SERPL-MCNC: 3.1 MG/DL — HIGH (ref 1.6–2.6)
MCHC RBC-ENTMCNC: 26.3 PG — LOW (ref 27–34)
MCHC RBC-ENTMCNC: 30.6 GM/DL — LOW (ref 32–36)
MCV RBC AUTO: 86 FL — SIGNIFICANT CHANGE UP (ref 80–100)
MONOCYTES # BLD AUTO: 0.52 K/UL — SIGNIFICANT CHANGE UP (ref 0–0.9)
MONOCYTES NFR BLD AUTO: 4.9 % — SIGNIFICANT CHANGE UP (ref 2–14)
NEUTROPHILS # BLD AUTO: 7.77 K/UL — HIGH (ref 1.8–7.4)
NEUTROPHILS NFR BLD AUTO: 73.4 % — SIGNIFICANT CHANGE UP (ref 43–77)
NRBC # BLD: 0 /100 WBCS — SIGNIFICANT CHANGE UP (ref 0–0)
PHOSPHATE SERPL-MCNC: 4.1 MG/DL — SIGNIFICANT CHANGE UP (ref 2.5–4.5)
PLATELET # BLD AUTO: 202 K/UL — SIGNIFICANT CHANGE UP (ref 150–400)
POTASSIUM SERPL-MCNC: 3.2 MMOL/L — LOW (ref 3.5–5.3)
POTASSIUM SERPL-MCNC: 3.3 MMOL/L — LOW (ref 3.5–5.3)
POTASSIUM SERPL-SCNC: 3.2 MMOL/L — LOW (ref 3.5–5.3)
POTASSIUM SERPL-SCNC: 3.3 MMOL/L — LOW (ref 3.5–5.3)
PROT SERPL-MCNC: 6.7 G/DL — SIGNIFICANT CHANGE UP (ref 6–8.3)
RAPID RVP RESULT: SIGNIFICANT CHANGE UP
RBC # BLD: 3.35 M/UL — LOW (ref 3.8–5.2)
RBC # FLD: 16.6 % — HIGH (ref 10.3–14.5)
SARS-COV-2 RNA SPEC QL NAA+PROBE: SIGNIFICANT CHANGE UP
SODIUM SERPL-SCNC: 139 MMOL/L — SIGNIFICANT CHANGE UP (ref 135–145)
SODIUM SERPL-SCNC: 140 MMOL/L — SIGNIFICANT CHANGE UP (ref 135–145)
WBC # BLD: 10.59 K/UL — HIGH (ref 3.8–10.5)
WBC # FLD AUTO: 10.59 K/UL — HIGH (ref 3.8–10.5)

## 2021-11-07 PROCEDURE — 71250 CT THORAX DX C-: CPT | Mod: 26

## 2021-11-07 PROCEDURE — 74176 CT ABD & PELVIS W/O CONTRAST: CPT | Mod: 26,MA

## 2021-11-07 PROCEDURE — 76770 US EXAM ABDO BACK WALL COMP: CPT | Mod: 26

## 2021-11-07 PROCEDURE — 99223 1ST HOSP IP/OBS HIGH 75: CPT | Mod: AI

## 2021-11-07 RX ORDER — SENNA PLUS 8.6 MG/1
2 TABLET ORAL AT BEDTIME
Refills: 0 | Status: DISCONTINUED | OUTPATIENT
Start: 2021-11-07 | End: 2021-11-11

## 2021-11-07 RX ORDER — SODIUM CHLORIDE 9 MG/ML
1000 INJECTION, SOLUTION INTRAVENOUS
Refills: 0 | Status: DISCONTINUED | OUTPATIENT
Start: 2021-11-07 | End: 2021-11-08

## 2021-11-07 RX ORDER — VANCOMYCIN HCL 1 G
750 VIAL (EA) INTRAVENOUS ONCE
Refills: 0 | Status: COMPLETED | OUTPATIENT
Start: 2021-11-07 | End: 2021-11-07

## 2021-11-07 RX ORDER — POTASSIUM CHLORIDE 20 MEQ
20 PACKET (EA) ORAL ONCE
Refills: 0 | Status: COMPLETED | OUTPATIENT
Start: 2021-11-07 | End: 2021-11-07

## 2021-11-07 RX ORDER — DEXTROSE 50 % IN WATER 50 %
25 SYRINGE (ML) INTRAVENOUS ONCE
Refills: 0 | Status: DISCONTINUED | OUTPATIENT
Start: 2021-11-07 | End: 2021-11-11

## 2021-11-07 RX ORDER — GLUCAGON INJECTION, SOLUTION 0.5 MG/.1ML
1 INJECTION, SOLUTION SUBCUTANEOUS ONCE
Refills: 0 | Status: DISCONTINUED | OUTPATIENT
Start: 2021-11-07 | End: 2021-11-11

## 2021-11-07 RX ORDER — DEXTROSE 50 % IN WATER 50 %
25 SYRINGE (ML) INTRAVENOUS ONCE
Refills: 0 | Status: COMPLETED | OUTPATIENT
Start: 2021-11-07 | End: 2021-11-07

## 2021-11-07 RX ORDER — LACTOBACILLUS ACIDOPHILUS 100MM CELL
1 CAPSULE ORAL DAILY
Refills: 0 | Status: DISCONTINUED | OUTPATIENT
Start: 2021-11-07 | End: 2021-11-11

## 2021-11-07 RX ORDER — ACETAMINOPHEN 500 MG
650 TABLET ORAL EVERY 6 HOURS
Refills: 0 | Status: DISCONTINUED | OUTPATIENT
Start: 2021-11-07 | End: 2021-11-11

## 2021-11-07 RX ORDER — METHOCARBAMOL 500 MG/1
250 TABLET, FILM COATED ORAL
Refills: 0 | Status: DISCONTINUED | OUTPATIENT
Start: 2021-11-07 | End: 2021-11-11

## 2021-11-07 RX ORDER — SODIUM CHLORIDE 9 MG/ML
1000 INJECTION, SOLUTION INTRAVENOUS
Refills: 0 | Status: DISCONTINUED | OUTPATIENT
Start: 2021-11-07 | End: 2021-11-11

## 2021-11-07 RX ORDER — PANTOPRAZOLE SODIUM 20 MG/1
40 TABLET, DELAYED RELEASE ORAL DAILY
Refills: 0 | Status: DISCONTINUED | OUTPATIENT
Start: 2021-11-07 | End: 2021-11-11

## 2021-11-07 RX ORDER — INSULIN GLARGINE 100 [IU]/ML
36 INJECTION, SOLUTION SUBCUTANEOUS AT BEDTIME
Refills: 0 | Status: DISCONTINUED | OUTPATIENT
Start: 2021-11-07 | End: 2021-11-07

## 2021-11-07 RX ORDER — SUCRALFATE 1 G
1 TABLET ORAL
Refills: 0 | Status: DISCONTINUED | OUTPATIENT
Start: 2021-11-07 | End: 2021-11-11

## 2021-11-07 RX ORDER — PIPERACILLIN AND TAZOBACTAM 4; .5 G/20ML; G/20ML
3.38 INJECTION, POWDER, LYOPHILIZED, FOR SOLUTION INTRAVENOUS ONCE
Refills: 0 | Status: COMPLETED | OUTPATIENT
Start: 2021-11-07 | End: 2021-11-07

## 2021-11-07 RX ORDER — CEFEPIME 1 G/1
2000 INJECTION, POWDER, FOR SOLUTION INTRAMUSCULAR; INTRAVENOUS EVERY 24 HOURS
Refills: 0 | Status: DISCONTINUED | OUTPATIENT
Start: 2021-11-07 | End: 2021-11-07

## 2021-11-07 RX ORDER — POLYETHYLENE GLYCOL 3350 17 G/17G
17 POWDER, FOR SOLUTION ORAL EVERY 12 HOURS
Refills: 0 | Status: DISCONTINUED | OUTPATIENT
Start: 2021-11-07 | End: 2021-11-11

## 2021-11-07 RX ORDER — SIMETHICONE 80 MG/1
80 TABLET, CHEWABLE ORAL EVERY 6 HOURS
Refills: 0 | Status: DISCONTINUED | OUTPATIENT
Start: 2021-11-07 | End: 2021-11-11

## 2021-11-07 RX ORDER — SODIUM CHLORIDE 9 MG/ML
500 INJECTION INTRAMUSCULAR; INTRAVENOUS; SUBCUTANEOUS ONCE
Refills: 0 | Status: COMPLETED | OUTPATIENT
Start: 2021-11-07 | End: 2021-11-07

## 2021-11-07 RX ORDER — DEXTROSE 50 % IN WATER 50 %
15 SYRINGE (ML) INTRAVENOUS ONCE
Refills: 0 | Status: DISCONTINUED | OUTPATIENT
Start: 2021-11-07 | End: 2021-11-11

## 2021-11-07 RX ORDER — POTASSIUM CHLORIDE 20 MEQ
10 PACKET (EA) ORAL
Refills: 0 | Status: COMPLETED | OUTPATIENT
Start: 2021-11-07 | End: 2021-11-07

## 2021-11-07 RX ORDER — FLUCONAZOLE 150 MG/1
200 TABLET ORAL EVERY 24 HOURS
Refills: 0 | Status: DISCONTINUED | OUTPATIENT
Start: 2021-11-08 | End: 2021-11-07

## 2021-11-07 RX ORDER — IPRATROPIUM/ALBUTEROL SULFATE 18-103MCG
3 AEROSOL WITH ADAPTER (GRAM) INHALATION EVERY 6 HOURS
Refills: 0 | Status: DISCONTINUED | OUTPATIENT
Start: 2021-11-07 | End: 2021-11-07

## 2021-11-07 RX ORDER — POTASSIUM CHLORIDE 20 MEQ
10 PACKET (EA) ORAL ONCE
Refills: 0 | Status: COMPLETED | OUTPATIENT
Start: 2021-11-07 | End: 2021-11-07

## 2021-11-07 RX ORDER — VANCOMYCIN HCL 1 G
750 VIAL (EA) INTRAVENOUS EVERY 24 HOURS
Refills: 0 | Status: DISCONTINUED | OUTPATIENT
Start: 2021-11-08 | End: 2021-11-07

## 2021-11-07 RX ORDER — INSULIN HUMAN 100 [IU]/ML
INJECTION, SOLUTION SUBCUTANEOUS EVERY 6 HOURS
Refills: 0 | Status: DISCONTINUED | OUTPATIENT
Start: 2021-11-07 | End: 2021-11-07

## 2021-11-07 RX ORDER — DEXTROSE 50 % IN WATER 50 %
12.5 SYRINGE (ML) INTRAVENOUS ONCE
Refills: 0 | Status: DISCONTINUED | OUTPATIENT
Start: 2021-11-07 | End: 2021-11-11

## 2021-11-07 RX ORDER — FENTANYL CITRATE 50 UG/ML
100 INJECTION INTRAVENOUS ONCE
Refills: 0 | Status: DISCONTINUED | OUTPATIENT
Start: 2021-11-07 | End: 2021-11-07

## 2021-11-07 RX ORDER — SODIUM CHLORIDE 9 MG/ML
1000 INJECTION INTRAMUSCULAR; INTRAVENOUS; SUBCUTANEOUS
Refills: 0 | Status: DISCONTINUED | OUTPATIENT
Start: 2021-11-07 | End: 2021-11-07

## 2021-11-07 RX ORDER — ALBUTEROL 90 UG/1
2 AEROSOL, METERED ORAL EVERY 6 HOURS
Refills: 0 | Status: DISCONTINUED | OUTPATIENT
Start: 2021-11-07 | End: 2021-11-11

## 2021-11-07 RX ORDER — HEPARIN SODIUM 5000 [USP'U]/ML
5000 INJECTION INTRAVENOUS; SUBCUTANEOUS EVERY 8 HOURS
Refills: 0 | Status: DISCONTINUED | OUTPATIENT
Start: 2021-11-07 | End: 2021-11-11

## 2021-11-07 RX ORDER — ASPIRIN/CALCIUM CARB/MAGNESIUM 324 MG
81 TABLET ORAL DAILY
Refills: 0 | Status: DISCONTINUED | OUTPATIENT
Start: 2021-11-07 | End: 2021-11-11

## 2021-11-07 RX ORDER — FLUCONAZOLE 150 MG/1
200 TABLET ORAL ONCE
Refills: 0 | Status: COMPLETED | OUTPATIENT
Start: 2021-11-07 | End: 2021-11-07

## 2021-11-07 RX ADMIN — Medication 25 GRAM(S): at 12:10

## 2021-11-07 RX ADMIN — SODIUM CHLORIDE 1800 MILLILITER(S): 9 INJECTION INTRAMUSCULAR; INTRAVENOUS; SUBCUTANEOUS at 00:30

## 2021-11-07 RX ADMIN — Medication 1 MILLIGRAM(S): at 17:43

## 2021-11-07 RX ADMIN — HEPARIN SODIUM 5000 UNIT(S): 5000 INJECTION INTRAVENOUS; SUBCUTANEOUS at 16:26

## 2021-11-07 RX ADMIN — SODIUM CHLORIDE 500 MILLILITER(S): 9 INJECTION INTRAMUSCULAR; INTRAVENOUS; SUBCUTANEOUS at 03:30

## 2021-11-07 RX ADMIN — Medication 1 GRAM(S): at 06:36

## 2021-11-07 RX ADMIN — SODIUM CHLORIDE 80 MILLILITER(S): 9 INJECTION, SOLUTION INTRAVENOUS at 12:54

## 2021-11-07 RX ADMIN — Medication 3 MILLILITER(S): at 12:33

## 2021-11-07 RX ADMIN — Medication 3 MILLILITER(S): at 08:11

## 2021-11-07 RX ADMIN — SIMETHICONE 80 MILLIGRAM(S): 80 TABLET, CHEWABLE ORAL at 06:37

## 2021-11-07 RX ADMIN — FLUCONAZOLE 100 MILLIGRAM(S): 150 TABLET ORAL at 01:30

## 2021-11-07 RX ADMIN — ALBUTEROL 2 PUFF(S): 90 AEROSOL, METERED ORAL at 23:58

## 2021-11-07 RX ADMIN — Medication 2 MILLIGRAM(S): at 22:21

## 2021-11-07 RX ADMIN — PIPERACILLIN AND TAZOBACTAM 3.38 GRAM(S): 4; .5 INJECTION, POWDER, LYOPHILIZED, FOR SOLUTION INTRAVENOUS at 01:30

## 2021-11-07 RX ADMIN — FLUCONAZOLE 200 MILLIGRAM(S): 150 TABLET ORAL at 02:31

## 2021-11-07 RX ADMIN — CEFEPIME 100 MILLIGRAM(S): 1 INJECTION, POWDER, FOR SOLUTION INTRAMUSCULAR; INTRAVENOUS at 04:34

## 2021-11-07 RX ADMIN — Medication 100 MILLIEQUIVALENT(S): at 04:32

## 2021-11-07 RX ADMIN — Medication 1 GRAM(S): at 17:44

## 2021-11-07 RX ADMIN — Medication 20 MILLIEQUIVALENT(S): at 12:55

## 2021-11-07 RX ADMIN — CHLORHEXIDINE GLUCONATE 15 MILLILITER(S): 213 SOLUTION TOPICAL at 06:49

## 2021-11-07 RX ADMIN — POLYETHYLENE GLYCOL 3350 17 GRAM(S): 17 POWDER, FOR SOLUTION ORAL at 06:37

## 2021-11-07 RX ADMIN — Medication 100 MILLIEQUIVALENT(S): at 07:36

## 2021-11-07 RX ADMIN — Medication 1 GRAM(S): at 12:55

## 2021-11-07 RX ADMIN — Medication 1 GRAM(S): at 23:09

## 2021-11-07 RX ADMIN — FENTANYL CITRATE 100 MICROGRAM(S): 50 INJECTION INTRAVENOUS at 19:07

## 2021-11-07 RX ADMIN — Medication 81 MILLIGRAM(S): at 12:55

## 2021-11-07 RX ADMIN — ALBUTEROL 2 PUFF(S): 90 AEROSOL, METERED ORAL at 19:16

## 2021-11-07 RX ADMIN — CHLORHEXIDINE GLUCONATE 15 MILLILITER(S): 213 SOLUTION TOPICAL at 17:45

## 2021-11-07 RX ADMIN — PANTOPRAZOLE SODIUM 40 MILLIGRAM(S): 20 TABLET, DELAYED RELEASE ORAL at 16:26

## 2021-11-07 RX ADMIN — Medication 250 MILLIGRAM(S): at 02:30

## 2021-11-07 RX ADMIN — METHOCARBAMOL 250 MILLIGRAM(S): 500 TABLET, FILM COATED ORAL at 06:37

## 2021-11-07 RX ADMIN — Medication 1 MILLIGRAM(S): at 18:27

## 2021-11-07 RX ADMIN — SIMETHICONE 80 MILLIGRAM(S): 80 TABLET, CHEWABLE ORAL at 23:09

## 2021-11-07 RX ADMIN — SODIUM CHLORIDE 100 MILLILITER(S): 9 INJECTION INTRAMUSCULAR; INTRAVENOUS; SUBCUTANEOUS at 03:14

## 2021-11-07 RX ADMIN — SODIUM CHLORIDE 100 MILLILITER(S): 9 INJECTION INTRAMUSCULAR; INTRAVENOUS; SUBCUTANEOUS at 03:30

## 2021-11-07 RX ADMIN — Medication 100 MILLIEQUIVALENT(S): at 12:54

## 2021-11-07 RX ADMIN — HEPARIN SODIUM 5000 UNIT(S): 5000 INJECTION INTRAVENOUS; SUBCUTANEOUS at 06:37

## 2021-11-07 RX ADMIN — PIPERACILLIN AND TAZOBACTAM 200 GRAM(S): 4; .5 INJECTION, POWDER, LYOPHILIZED, FOR SOLUTION INTRAVENOUS at 01:53

## 2021-11-07 RX ADMIN — Medication 2 MILLIGRAM(S): at 19:31

## 2021-11-07 RX ADMIN — METHOCARBAMOL 250 MILLIGRAM(S): 500 TABLET, FILM COATED ORAL at 17:44

## 2021-11-07 RX ADMIN — Medication 100 MILLIEQUIVALENT(S): at 05:13

## 2021-11-07 RX ADMIN — Medication 1 MILLIGRAM(S): at 22:21

## 2021-11-07 RX ADMIN — Medication 25 GRAM(S): at 06:45

## 2021-11-07 RX ADMIN — SIMETHICONE 80 MILLIGRAM(S): 80 TABLET, CHEWABLE ORAL at 12:55

## 2021-11-07 RX ADMIN — HEPARIN SODIUM 5000 UNIT(S): 5000 INJECTION INTRAVENOUS; SUBCUTANEOUS at 22:23

## 2021-11-07 RX ADMIN — SENNA PLUS 2 TABLET(S): 8.6 TABLET ORAL at 22:24

## 2021-11-07 RX ADMIN — Medication 1 TABLET(S): at 12:55

## 2021-11-07 RX ADMIN — SIMETHICONE 80 MILLIGRAM(S): 80 TABLET, CHEWABLE ORAL at 17:44

## 2021-11-07 NOTE — CONSULT NOTE ADULT - ASSESSMENT
75 year old female with chronic respiratory failure who is vent dependent with a history of subarachnoid hemorrhage and cardiac arrest with dementia admitted for aspiration vs vent associated PNA and elevated BUN/Cr likely secondary to dehydration with an element of UTI as well       BUN/Cr >20, no hydronephrosis seen on CT  hold nephrotoxic meds, Continue  hydration, Trend urine output, Follow up BUN/Creatinine/electrolytes   Possible PNA on CT with yeast in urine ( no hydronephrosis on CT ) on fluconazole Vanco and cefepime and follow up cultures   lantus with tube feeds at base rate and  insulin sliding scale with Q6 finger sticks  chronic vent settings with standing  albuterol and duonebs   Sub Q heparin subq for DVT prophylaxis     Time spent: 30 mins assessing presenting problems of acute illness that poses high probability  end organ damage/dysfunction.  Medical decision making inculding plan of daily care, reviewing data, reviewing radiology, discussing with multidisciplinary team, non inclusive of procedures, discussing goals of care with patient/family

## 2021-11-07 NOTE — H&P ADULT - NSHPSOCIALHISTORY_GEN_ALL_CORE
, resides in [facility] nursing home facility, unknown smoking/ETOH/drug history.

## 2021-11-07 NOTE — CONSULT NOTE ADULT - ASSESSMENT
74y/o seen at [facility] ICU. From [facility]  History from chart  History HTN, DM, dementia, s/p sub-arachnoid hemorrhage, s/p cardiac arrest, COPD, respiratory failure (trach), PEG, s/p CVA, GERD    Admitted for abnormal lab    H&H-8.8/28.8.    Potassium-3.2.     BUN/Creat-86/1.85 (Improving)    Patient lying flat, sleeping comfortably. Trached    Plan:  - On IV antibiotics  - See Echocardiogram of 9/8/21 with normal LV and moderate pulmonary HTN  - K-supps  - Follow labs  - Mechanical ventilation  - Supportive care  - Being followed by Palliative care, Pulmonary  - Poor prognosis

## 2021-11-07 NOTE — DIETITIAN INITIAL EVALUATION ADULT. - PERTINENT MEDS FT
MEDICATIONS  (STANDING):  ALBUTerol    90 MICROgram(s) HFA Inhaler 2 Puff(s) Inhalation every 6 hours  albuterol/ipratropium for Nebulization 3 milliLiter(s) Nebulizer every 6 hours  aspirin  chewable 81 milliGRAM(s) Oral daily  chlorhexidine 0.12% Liquid 15 milliLiter(s) Oral Mucosa every 12 hours  dextrose 40% Gel 15 Gram(s) Oral once  dextrose 5%. 1000 milliLiter(s) (50 mL/Hr) IV Continuous <Continuous>  dextrose 5%. 1000 milliLiter(s) (100 mL/Hr) IV Continuous <Continuous>  dextrose 50% Injectable 25 Gram(s) IV Push once  dextrose 50% Injectable 12.5 Gram(s) IV Push once  dextrose 50% Injectable 25 Gram(s) IV Push once  glucagon  Injectable 1 milliGRAM(s) IntraMuscular once  heparin   Injectable 5000 Unit(s) SubCutaneous every 8 hours  insulin regular  human corrective regimen sliding scale   SubCutaneous every 6 hours  lactobacillus acidophilus 1 Tablet(s) Oral daily  methocarbamol 250 milliGRAM(s) Oral two times a day  pantoprazole   Suspension 40 milliGRAM(s) Oral daily  polyethylene glycol 3350 17 Gram(s) Oral every 12 hours  senna 2 Tablet(s) Oral at bedtime  simethicone 80 milliGRAM(s) Chew every 6 hours  sodium chloride 0.9%. 1000 milliLiter(s) (100 mL/Hr) IV Continuous <Continuous>  sucralfate suspension 1 Gram(s) Oral four times a day    MEDICATIONS  (PRN):  acetaminophen     Tablet .. 650 milliGRAM(s) Oral every 6 hours PRN Mild Pain (1 - 3)  acetaminophen    Suspension .. 650 milliGRAM(s) Oral every 6 hours PRN Temp greater or equal to 38C (100.4F)  LORazepam     Tablet 1 milliGRAM(s) Oral three times a day PRN Agitation

## 2021-11-07 NOTE — CONSULT NOTE ADULT - ASSESSMENT
75F with chronic resp failure - vent dependent, hx of subarachnoid hemorrhage, hx of cardiac arrest, dementia s/p PEG, HTN, DM2 on insulin, hx of CVA, GERD, COPD, admission here from 9/25 to 10/4 for respiratory failure/sepsis possibly 2/2 aspiration vs vent associated PNA who presents from [facility] for abnormal labs.   75F with chronic resp failure - vent dependent, hx of subarachnoid hemorrhage, hx of cardiac arrest, dementia s/p PEG, HTN, DM2 on insulin, hx of CVA, GERD, COPD, admission here from 9/25 to 10/4 for respiratory failure/sepsis possibly 2/2 aspiration vs vent associated PNA who presents from [facility] for abnormal labs.    manasa - IVF  serial labs  I and O  vent support  not a candidate for weaning  spoke with   full code  GOC discussion   HCP - Marciano -   oral hygiene  cx in progress  asp prec - HOB elev -

## 2021-11-07 NOTE — CONSULT NOTE ADULT - ASSESSMENT
CHAPINCITO GUIDRY 75 f [facility] S 11/7/2021   DR ILIANA KEMP     Initial evaluation/Pulmonary Critical Care consultation requested on 11/7/2021  by Dr RUIZ   from Dr Sandoval   Patient examined chart reviewed    HOSPITAL ADMISSION   PATIENT CAME  FROM (if information available)      CHAPINCITO Bai f [facility] S 11/7/2021   DR ILIANA KEMP     REVIEW OF SYMPTOMS      Able to give (reliable) ROS  NO     PHYSICAL EXAM    HEENT Unremarkable  atraumatic   RESP Fair air entry EXP prolonged    Harsh breath sound Resp distres mild   CARDIAC S1 S2 No S3     NO JVD    ABDOMEN SOFT BS PRESENT NOT DISTENDED No hepatosplenomegaly   PEDAL EDEMA present No calf tenderness  NO rash             PATIENT PRESENTATION/COURSE.  75F with chronic resp failure vent dependent, hx of subarachnoid hemorrhage, anoxic encephalopathy hx of cardiac arrest, dementia sp trach s/p PEG, HTN, DM2 on insulin, hx of CVA, GERD, COPD, recently  admitted 9/7-9/15 sp cac asp pneu  Rxed with zosyn       Readmission here from 9/25 to 10/4 for respiratory failure/sepsis possibly 2/2 aspiration vs vent associated PNA who presentsed from [facility] for abnormal labs.    Pt started on IV fluconazole vanco cefepime by house md at admission 11/7/2021 for suspected aspn pneum  Pulm icu consulted 11/7/2021     Patient was found to have an elevated BUN of 42 and then on repeat BUN of 100.  Was sent here for further evaluation.  In the ED, patient's initial triage vitals were BP 97/60, HR 72, RR 18, 100% on vent and T98.5F.  Labs showed leukocytosis of 13.7, BUN/Cr 104/2.45, lactate 3.3.          PAST AUGUST.     9/28/2021 sputum 9/28 showed crp   9/26 sp stenotrophomonas pseucomonas serratia  9/8/2021 ECHO n lvsf mild dd pasp 51                                    PROBLEMS/ISSUES.  GOC.  Full code   COVID STATUS.   11/7/2021 scv2 (-)     VDRF pmh  LACTICEMIA poa   ASPIRATION PNEUMONIA poa  NEW DENSITY rll 11/7/2021 CT [facility]   ANEMIA normo poa.   HYPOGLYCEMIA 11/7/2021 G 29  RYAN 11/7/2021 Cr 1.8     VDRF pmh  TRACH pmh  PEG pmh   DM pmh  ANOXIC ENCEPHALOPATHY pmh    PATIENT DATA   VITALS/PO/IO/VENT/DRIPS.     11/7/2021 afeb 60 100/50   11/7/2021 AC 18/400/5/.6         ASSESSMENT/RECOMMENDATIONS.      VDRF pmh  Continue vent   vent bundle   HEMODYNAMICS.   9/8/2021 ECHO n lvsf mild dd pasp 51  target map 65 (+)    LACTICEMIA poa   11/6-11/7 la 3.3-1.6  COPD.  11/7/2021 albuterol  11/7/2021 duoneb   ASPIRATION PNEUMONIA poa  w 11/7/2021 w 10  11/7/2021 One dose diflucan vanco zosyn given initially  11/7/2021 ID Dr Mcpherson recommended to defer abio   NEW DENSITY rll 11/7/2021 CT [facility]   11/7/2021 ct ch cw 9/25/2021   Basal atelect  Calcific density material rll new possibly aspirated material   Prev bl patchy opac perihilar almost resolved   Air dilated esophags nsc   Monitor serial imaging   CAD.  11/7/2021 ASA 81  ANEMIA normo poa.   Hb 10/3-11/7/2021 Hb 8.4-8.8  monitor  HYPOGLYCEMIA poa  11/7/2021 G 29  11/7/2021 Consulted Dr Perlman   Monitor  Check cortisol  RYAN poa  10/3-11/6-11/7/2021 Cr 0.9-2.4-1.8   11/7/2021 IV ns 100   Monitor       TIME SPENT   Over 55 minutes aggregate critical care time spent on encounter; activities included   direct patient care, counseling and/or coordinating care reviewing notes, lab data/ imaging , discussion with multidisciplinary team/ patient  /family and explaining in detail risks, benefits, alternatives  of the recommendations     CHAPINCITO GUIDRY 75 f [facility] S 11/7/2021   DR ILIANA KEMP

## 2021-11-07 NOTE — CONSULT NOTE ADULT - SUBJECTIVE AND OBJECTIVE BOX
CARDIOLOGY CONSULT NOTE    Patient is a 75y Female with a known history of :    HPI:  ***Patient with dementia and is not responsive.  Collateral information obtained from chart and notes.***    75F with chronic resp failure - vent dependent, hx of subarachnoid hemorrhage, hx of cardiac arrest, dementia s/p PEG, HTN, DM2 on insulin, hx of CVA, GERD, COPD, admission here from 9/25 to 10/4 for respiratory failure/sepsis possibly 2/2 aspiration vs vent associated PNA who presents from [facility] for abnormal labs.  Unclear as to the reason why labs were done yesterday but patient was found to have an elevated BUN of 42 and then on repeat BUN of 100.  Was sent here for further evaluation.  In the ED, patient's initial triage vitals were BP 97/60, HR 72, RR 18, 100% on vent and T98.5F.  Labs showed leukocytosis of 13.7, BUN/Cr 104/2.45, lactate 3.3.  Patient given 1.8L of NS.  Patient's CT A/P showed no hydronephrosis but showed a calcific density within the right lung which may represent aspirated material (new from previous exam).  Started on vancomycin and zosyn empirically for PNA.  Patient's UA also showed many yeast and was started on fluconazole empirically as well.  Patient's repeat lactate trended down to 1.6 on repeat.  Patient RVP and COVID negative.        Calcific density within the right lung may represent aspirated material. This is new since the previous exam. (07 Nov 2021 01:30)      REVIEW OF SYSTEMS:    CONSTITUTIONAL: No fever, weight loss, or fatigue  EYES: No eye pain, visual disturbances, or discharge  ENMT:  No difficulty hearing, tinnitus, vertigo; No sinus or throat pain  NECK: No pain or stiffness  BREASTS: No pain, masses, or nipple discharge  RESPIRATORY: No cough, wheezing, chills or hemoptysis; No shortness of breath  CARDIOVASCULAR: No chest pain, palpitations, dizziness, or leg swelling  GASTROINTESTINAL: No abdominal or epigastric pain. No nausea, vomiting, or hematemesis; No diarrhea or constipation. No melena or hematochezia.  GENITOURINARY: No dysuria, frequency, hematuria, or incontinence  NEUROLOGICAL: No headaches, memory loss, loss of strength, numbness, or tremors  SKIN: No itching, burning, rashes, or lesions   LYMPH NODES: No enlarged glands  ENDOCRINE: No heat or cold intolerance; No hair loss  MUSCULOSKELETAL: No joint pain or swelling; No muscle, back, or extremity pain  PSYCHIATRIC: No depression, anxiety, mood swings, or difficulty sleeping  HEME/LYMPH: No easy bruising, or bleeding gums  ALLERGY AND IMMUNOLOGIC: No hives or eczema    MEDICATIONS  (STANDING):  ALBUTerol    90 MICROgram(s) HFA Inhaler 2 Puff(s) Inhalation every 6 hours  albuterol/ipratropium for Nebulization 3 milliLiter(s) Nebulizer every 6 hours  aspirin  chewable 81 milliGRAM(s) Oral daily  cefepime   IVPB 2000 milliGRAM(s) IV Intermittent every 24 hours  chlorhexidine 0.12% Liquid 15 milliLiter(s) Oral Mucosa every 12 hours  dextrose 40% Gel 15 Gram(s) Oral once  dextrose 5%. 1000 milliLiter(s) (50 mL/Hr) IV Continuous <Continuous>  dextrose 5%. 1000 milliLiter(s) (100 mL/Hr) IV Continuous <Continuous>  dextrose 50% Injectable 25 Gram(s) IV Push once  dextrose 50% Injectable 12.5 Gram(s) IV Push once  dextrose 50% Injectable 25 Gram(s) IV Push once  fluconAZOLE IVPB 200 milliGRAM(s) IV Intermittent every 24 hours  glucagon  Injectable 1 milliGRAM(s) IntraMuscular once  heparin   Injectable 5000 Unit(s) SubCutaneous every 8 hours  insulin regular  human corrective regimen sliding scale   SubCutaneous every 6 hours  lactobacillus acidophilus 1 Tablet(s) Oral daily  methocarbamol 250 milliGRAM(s) Oral two times a day  pantoprazole   Suspension 40 milliGRAM(s) Oral daily  polyethylene glycol 3350 17 Gram(s) Oral every 12 hours  senna 2 Tablet(s) Oral at bedtime  simethicone 80 milliGRAM(s) Chew every 6 hours  sodium chloride 0.9%. 1000 milliLiter(s) (100 mL/Hr) IV Continuous <Continuous>  sucralfate suspension 1 Gram(s) Oral four times a day  vancomycin  IVPB 750 milliGRAM(s) IV Intermittent every 24 hours    MEDICATIONS  (PRN):  acetaminophen     Tablet .. 650 milliGRAM(s) Oral every 6 hours PRN Mild Pain (1 - 3)  acetaminophen    Suspension .. 650 milliGRAM(s) Oral every 6 hours PRN Temp greater or equal to 38C (100.4F)  LORazepam     Tablet 1 milliGRAM(s) Oral three times a day PRN Agitation      ALLERGIES: codeine (Hives)      FAMILY HISTORY:      Social History:  Alochol:   Smoking:   Drug Use:   Marital Status:     I&O's Detail    06 Nov 2021 08:01  -  07 Nov 2021 07:00  --------------------------------------------------------  IN:    Enteral Tube Flush: 50 mL    Glucerna 1.5: 100 mL    IV PiggyBack: 1000 mL    sodium chloride 0.9%: 500 mL    Sodium Chloride 0.9% Bolus: 500 mL  Total IN: 2150 mL    OUT:  Total OUT: 0 mL    Total NET: 2150 mL          PHYSICAL EXAMINATION:  -----------------------------  T(C): 36.4 (11-07-21 @ 08:47), Max: 36.9 (11-06-21 @ 22:21)  HR: 59 (11-07-21 @ 08:14) (57 - 72)  BP: 102/59 (11-07-21 @ 08:00) (89/47 - 133/56)  RR: 13 (11-07-21 @ 08:00) (13 - 20)  SpO2: 100% (11-07-21 @ 08:14) (98% - 100%)  Wt(kg): --    11-06 @ 08:01  -  11-07 @ 07:00  --------------------------------------------------------  IN:    Enteral Tube Flush: 50 mL    Glucerna 1.5: 100 mL    IV PiggyBack: 1000 mL    sodium chloride 0.9%: 500 mL    Sodium Chloride 0.9% Bolus: 500 mL  Total IN: 2150 mL    OUT:  Total OUT: 0 mL    Total NET: 2150 mL        Height (cm): 165.1 (11-06 @ 22:21)  Weight (kg): 56.4 (11-06 @ 22:21)  BMI (kg/m2): 20.7 (11-06 @ 22:21)  BSA (m2): 1.62 (11-06 @ 22:21)    Constitutional: well developed, normal appearance, well groomed, well nourished, no deformities and no acute distress.   Eyes: the conjunctiva exhibited no abnormalities and the eyelids demonstrated no xanthelasmas.   HEENT: normal oral mucosa, no oral pallor and no oral cyanosis.   Neck: normal jugular venous A waves present, normal jugular venous V waves present and no jugular venous obregon A waves.   Pulmonary: no respiratory distress, normal respiratory rhythm and effort, no accessory muscle use and lungs were clear to auscultation bilaterally. B/L anterior rhonchi  Cardiovascular: heart rate and rhythm were normal, normal S1 and S2 and no murmur, gallop, rub, heave or thrill are present.   Musculoskeletal: the gait could not be assessed.   Extremities: no clubbing of the fingernails, no localized cyanosis, no petechial hemorrhages and no ischemic changes.   Skin: normal skin color and pigmentation, no rash, no venous stasis, no skin lesions, no skin ulcer and no xanthoma was observed.     LABS:   --------  11-07    139  |  101  |  86<H>  ----------------------------<  28<LL>  3.2<L>   |  30  |  1.85<H>    Ca    8.1<L>      07 Nov 2021 06:39  Phos  4.1     11-07  Mg     3.1     11-07    TPro  6.7  /  Alb  2.1<L>  /  TBili  0.6  /  DBili  x   /  AST  141<H>  /  ALT  42  /  AlkPhos  87  11-07                         8.8    10.59 )-----------( 202      ( 07 Nov 2021 06:39 )             28.8     PT/INR - ( 06 Nov 2021 23:11 )   PT: 13.9 sec;   INR: 1.16 ratio         PTT - ( 06 Nov 2021 23:11 )  PTT:42.3 sec              RADIOLOGY:  -----------------    < from: US Transthoracic Echocardiogram w/Doppler Complete (09.08.21 @ 12:16) >    EXAM:  US TTE W DOPPLER COMPLETE                                  PROCEDURE DATE:  09/08/2021          INTERPRETATION:  Indication: cardiac arrest    Technician: Cat Wolf    Study Quality: Technical difficult study    Height 5 feet 4 inches,weight 110 pounds, blood pressure 114/58 mmHg    Measurements: Aortic root size 2.4 cm, left it is at 2 1 8 cm, right atrial 4.1 cm, right ventricular size 2.2 cm, left ventricular end-diastolic diameter 3.7 cm, left ventricular end-systolic diameter2.4 cm, septal wall thickness 1.0 cm, posterior wall thickness 0.8 cm, aortic velocity 1.7 m/s, mitral E velocity 1.1 m/s, ejection fraction approximately 65%.    Mitral Valve: Mildly thickened mitral valve with normal opening. Mild mitral regurgitation  Aortic Valve: Mild fibrocalcific changes with normal cusp excursion.  Left Atrium: Normal size  Left Ventricle: Normal size, normal wall thickness with normal left ventricular systolic function with mild diastolic dysfunction  Pericardium: Small posterior pericardial effusion  Tricuspid Valve: Not well-visualized but appears to be normal with mild tricuspid regurgitation with estimated right ventricular systolic pressure 51 mmHg, IVC size 1.9 cm with less than 50% respiratory variation  Pulmonic Valve: Not well-visualized but appears to be normal  Right Ventricle: Upper limits of normal size with normal right ventricular systolic function  Right Atrium: Mildly enlarged    IMPRESSION:  1. grossly normal overall left ventricular systolic function with mild diastolic dysfunction  2. normal right ventricular systolic function  3. mild tricuspid regurgitation with moderate pulmonary hypertension    --- End of Report ---              [facility]  This document has been electronically signed. Sep  9 2021  8:14AM    < end of copied text >      ECG: Pending    Monitor-NSR

## 2021-11-07 NOTE — CONSULT NOTE ADULT - ASSESSMENT
Patient with dementia and is not responsive.  Collateral information obtained from chart and notes 75F with chronic resp failure - vent dependent, hx of subarachnoid hemorrhage, hx of cardiac arrest, dementia s/p PEG, HTN, DM2 on insulin, hx of CVA, GERD, COPD, admission here from 9/25 to 10/4 for respiratory failure/sepsis possibly 2/2 aspiration vs vent associated PNA who presents from [facility] for abnormal labs.  Unclear as to the reason why labs were done yesterday but patient was found to have an elevated BUN of 42 and then on repeat BUN of 100.  Was sent here for further evaluation.  In the ED, patient's initial triage vitals were BP 97/60, HR 72, RR 18, 100% on vent and T98.5F.  Labs showed leukocytosis of 13.7, BUN/Cr 104/2.45, lactate 3.3.  Patient given 1.8L of NS.  Patient's CT A/P showed no hydronephrosis but showed a calcific density within the right lung which may represent aspirated material (new from previous exam).  Started on vancomycin and zosyn empirically for PNA.  Patient's UA also showed many yeast and was started on fluconazole empirically as well.  Patient's repeat lactate trended down to 1.6 on repeat.  Patient RVP and COVID negative.    Calcific density within the right lung may represent aspirated material. This is new since the previous exam. now with manasa       ACUTE RENAL FAILURE: most likely due to prerenal azotemia , hypovolemic hypotension manasa responding ok  and appropriately  with  slow hydration ,   will f/u with will check ua , urine osmolality , urine sodium , urine uric acid , serum sodium , serum osmolality , serum uric acid , f/u with hyponatremia work up , f/u with bmp , monitor i and o  Serum creatinine is  at  1.68    , approximating GFR at  29  ml/min.   There is no progression . No uremic symptoms  pos  evidence of anemia .  Fluid status stable.  Will continue to avoid nephrotoxic drugs.  Patient remains asymptomatic.   Continue current therapy.  hold  diuretic.  hold   ACE inhibitor.  hold   ARB.  Additional evaluation:   ECG,    echocardiogram,     CXR,  will obtained recent   renal ultrasound to evalaute kidney size and possible stones ,

## 2021-11-07 NOTE — ED ADULT NURSE REASSESSMENT NOTE - NS ED NURSE REASSESS COMMENT FT1
pt seen and examined by dr massey. antibiotic therapy in progress.  at bedside presently. pt to be admitted to SPCU. telephone report given to BRANDEN Reich

## 2021-11-07 NOTE — CONSULT NOTE ADULT - SUBJECTIVE AND OBJECTIVE BOX
[facility] DIVISION of INFECTIOUS DISEASE  Arturo Olivas MD PhD, Haylee Umanzor MD, Andressa Brantley MD, Billy Valenzuela MD, Emil Medellin MD  and providing coverage with Alexa Canas MD and Eusebio Chairez MD  Providing Infectious Disease Consultations at [facility], [facility]'s    Office# [phone] to schedule follow up appointments  Answering Service for urgent calls or New Consults [phone]  Cell# to text for urgent issues Arturo Olivas [phone]     HPI:  ***Patient with dementia and is not responsive.  Collateral information obtained from chart and notes.***    75F with chronic resp failure - vent dependent, hx of subarachnoid hemorrhage, hx of cardiac arrest, dementia s/p PEG, HTN, DM2 on insulin, hx of CVA, GERD, COPD, admission here from  to 10/4 for respiratory failure/sepsis possibly 2/2 aspiration vs vent associated PNA who presents from [facility] for abnormal labs.  Unclear as to the reason why labs were done yesterday but patient was found to have an elevated BUN of 42 and then on repeat BUN of 100.  Was sent here for further evaluation.  In the ED, patient's initial triage vitals were BP 97/60, HR 72, RR 18, 100% on vent and T98.5F.  Labs showed leukocytosis of 13.7, BUN/Cr 104/2.45, lactate 3.3.  Patient given 1.8L of NS.  Patient's CT A/P showed no hydronephrosis but showed a calcific density within the right lung which may represent aspirated material (new from previous exam).  Started on vancomycin and zosyn empirically for PNA.  Patient's UA also showed many yeast and was started on fluconazole empirically as well.  Patient's repeat lactate trended down to 1.6 on repeat.  Patient RVP and COVID negative.        Calcific density within the right lung may represent aspirated material. This is new since the previous exam.       PAST MEDICAL & SURGICAL HISTORY:  Dementia of frontal lobe type  Aphasic stroke  Respiratory failure  Hypertension  GERD (gastroesophageal reflux disease)  Constipation  Respiratory failure  CVA (cerebral vascular accident)  HTN (hypertension)  DM (diabetes mellitus)  COVID-19 virus detected  Quadriplegia  Pneumonia- recurrent aspiration  Type II diabetes mellitus    Hx of appendectomy  Gastrostomy in place  Tracheostomy in place  Tracheostomy tube present  Feeding by G-tube      Antimicrobials  cefepime   IVPB 2000 milliGRAM(s) IV Intermittent every 24 hours  fluconAZOLE IVPB 200 milliGRAM(s) IV Intermittent every 24 hours  vancomycin  IVPB 750 milliGRAM(s) IV Intermittent every 24 hours      Immunological      Other  acetaminophen     Tablet .. 650 milliGRAM(s) Oral every 6 hours PRN  acetaminophen    Suspension .. 650 milliGRAM(s) Oral every 6 hours PRN  ALBUTerol    90 MICROgram(s) HFA Inhaler 2 Puff(s) Inhalation every 6 hours  albuterol/ipratropium for Nebulization 3 milliLiter(s) Nebulizer every 6 hours  aspirin  chewable 81 milliGRAM(s) Oral daily  chlorhexidine 0.12% Liquid 15 milliLiter(s) Oral Mucosa every 12 hours  dextrose 40% Gel 15 Gram(s) Oral once  dextrose 5%. 1000 milliLiter(s) IV Continuous <Continuous>  dextrose 5%. 1000 milliLiter(s) IV Continuous <Continuous>  dextrose 50% Injectable 25 Gram(s) IV Push once  dextrose 50% Injectable 12.5 Gram(s) IV Push once  dextrose 50% Injectable 25 Gram(s) IV Push once  glucagon  Injectable 1 milliGRAM(s) IntraMuscular once  heparin   Injectable 5000 Unit(s) SubCutaneous every 8 hours  insulin regular  human corrective regimen sliding scale   SubCutaneous every 6 hours  lactobacillus acidophilus 1 Tablet(s) Oral daily  LORazepam     Tablet 1 milliGRAM(s) Oral three times a day PRN  methocarbamol 250 milliGRAM(s) Oral two times a day  pantoprazole   Suspension 40 milliGRAM(s) Oral daily  polyethylene glycol 3350 17 Gram(s) Oral every 12 hours  senna 2 Tablet(s) Oral at bedtime  simethicone 80 milliGRAM(s) Chew every 6 hours  sodium chloride 0.9%. 1000 milliLiter(s) IV Continuous <Continuous>  sucralfate suspension 1 Gram(s) Oral four times a day      Allergies    codeine (Hives)    Intolerances        SOCIAL HISTORY:  Social History:  , resides in [facility] nursing home facility, unknown smoking/ETOH/drug history. (2021 01:30)      FAMILY HISTORY:      ROS:    EYES:  Negative  blurry vision or double vision  GASTROINTESTINAL:  Negative for nausea, vomiting, diarrhea  -otherwise negative except for subjective    Vital Signs Last 24 Hrs  T(C): 36.4 (2021 08:47), Max: 36.9 (2021 22:21)  T(F): 97.6 (2021 08:47), Max: 98.5 (2021 22:21)  HR: 59 (2021 08:14) (57 - 72)  BP: 102/59 (2021 08:00) (89/47 - 133/56)  BP(mean): 73 (2021 08:00) (6 - 73)  RR: 13 (2021 08:00) (13 - 20)  SpO2: 100% (2021 08:14) (98% - 100%)    PE:  WDWN in no distress  HEENT:  NC, PERRL, sclerae anicteric, conjunctivae clear, EOMI.  Sinuses nontender, no nasal exudate.  No buccal or pharyngeal lesions, erythema or exudate  Neck:  Supple, no adenopathy, intubated via trach  Lungs:  No accessory muscle use, bilaterally clear to auscultation, decreased in bases but no ronchi, no crackles  Cor:  distant  Abd:  Symmetric, normoactive BS.  Soft, nontender, no masses, guarding or rebound.  Liver and spleen not enlarged  Extrem:  No cyanosis   Skin:  No rashes.  Neuro: nonverbal    Mode: AC/ CMV, RR (machine): 18, TV (machine): 400, FiO2: 60, PEEP: 5, ITime: 1, MAP: 13, PIP: 33    LABS:                        8.8    10.59 )-----------(       ( 2021 06:39 )             28.8       WBC Count: 10.59 K/uL (21 @ 06:39)  WBC Count: 13.77 K/uL (21 @ 23:11)          139  |  101  |  86<H>  ----------------------------<  28<LL>  3.2<L>   |  30  |  1.85<H>    Ca    8.1<L>      2021 06:39  Phos  4.1       Mg     3.1         TPro  6.7  /  Alb  2.1<L>  /  TBili  0.6  /  DBili  x   /  AST  141<H>  /  ALT  42  /  AlkPhos  87        Creatinine, Serum: 1.85 mg/dL (21 @ 06:39)  Creatinine, Serum: 2.45 mg/dL (21 @ 23:11)      Urinalysis Basic - ( 2021 23:11 )    Color: Yellow / Appearance: Turbid / S.020 / pH: x  Gluc: x / Ketone: Trace  / Bili: Negative / Urobili: 4 mg/dL   Blood: x / Protein: 100 mg/dL / Nitrite: Negative   Leuk Esterase: Moderate / RBC: 3-5 /HPF / WBC    Sq Epi: x / Non Sq Epi: Few / Bacteria: Occasional      MICROBIOLOGY:      RADIOLOGY & ADDITIONAL STUDIES:    --< from: CT Chest No Cont (21 @ 02:52) >    EXAM:  CT CHEST                                  PROCEDURE DATE:  2021          INTERPRETATION:  CLINICAL INFORMATION: Elevated white count. Acute kidney failure    COMPARISON: CT abdomen pelvis 2021. CT chest abdomen and pelvis 2021.    CONTRAST/COMPLICATIONS:  IV Contrast: NONE  Oral Contrast: NONE  Complications: None reported at time of study completion    PROCEDURE:  CT of the Chest was performed.  Sagittal and coronal reformats were performed.    FINDINGS:    LUNGS AND AIRWAYS: Tracheostomy tube in place. Some layering fluid within the trachea. Patent central airways.  Bibasilar atelectasis. Calcific density material in the right lower lobe since the previous chest CT possibly representing aspirated material.  Previously seen bilateral patchy airspace opacities in a perihilar distribution have almost completely resolved since the previous chest CT.  PLEURA: Small left effusion with associated atelectasis not significantly changed. Trace right pleural effusion..  MEDIASTINUM AND JHONNY: Enlarged mediastinal lymph nodes are unchanged since the previous chest CT. Air dilated esophagus, unchanged.  VESSELS: Within normal limits.  HEART: Heart size is normal. No pericardial effusion.  CHEST WALL AND LOWER NECK: Withinnormal limits.  VISUALIZED UPPER ABDOMEN: Within normal limits.  BONES: Degenerative changes.    IMPRESSION:    Bilateral effusions and bibasilar atelectasis not significantly changed. Calcific density within the right lower lobe is new since the previous chest CT, and may represent aspirated material. Developing aspiration pneumonia cannot be excluded. Correlate clinically.    Previously seen bilateral patchy airspace opacities in a perihilar distribution have almost completely resolved since the previous chest CT.

## 2021-11-07 NOTE — H&P ADULT - ASSESSMENT
75F with chronic resp failure - vent dependent, hx of subarachnoid hemorrhage, hx of cardiac arrest, dementia s/p PEG, HTN, DM2 on insulin, hx of CVA, GERD, COPD, admission here from 9/25 to 10/4 for respiratory failure/sepsis possibly 2/2 aspiration vs vent associated PNA who presents from [facility] for abnormal labs, specifically with elevated BUN/Cr.  Also found to elevated leukocytosis (technically does not meet sepsis criteria -> only leukocytosis, no fever, tachycardia, or tachypnea).  However, still found to have possible aspiration on CT and yeast in her urine.      RYAN with elevated BUN - BUN/Cr >20, no hydronephrosis seen on CT, likely RYAN 2/2 hypovolemia  - admitted to medicine  - admit to SPCU for vent dependecny  - pulm/cc consult (Dr. Sandoval)  - continue with IV hydration with NS 100cc/hr  - recheck BMP -> if does not improve or worsens, consult nephrology  - avoid nephrotoxic meds  - hold diuretics   - consider US renal     Leukocytosis - unclear etiology, possible PNA seen on CT +/- yeast in urine, lactated trended down  - will continue with vancomycin 750mg q24h (CrCl 16.9) with vanco trough pre-3rd dose  - change zosyn to cefepime 2000mg q24h  - will continue fluconazole 200mg IV for yeast in urine  - f/u blood cultures  - ID consult (Dr. Olivas saw patient last)    Chronic respiratory failure/hx of COPD  - maintain vent setting with SaO2 >92%  - cont with albuterol inh and duonebs standing    DM2  - continue with lantus @ 36units nightly (was receiving same amount at [facility] with tube feeds -> will be on the same type of tube feeds with the same duration here  - start low dose insulin sliding scale with FS q6h while NPO    GERD  - cont with sucralfate  - cont with pantoprazole   - cont with simethicone    Muscle spams/anxiety  - cont with methocarbamol  - cont with ativan PRN    Preventive measures  - start with heparin subq for DVT ppx  - FULL CODE as per MARCI

## 2021-11-07 NOTE — PROGRESS NOTE ADULT - SUBJECTIVE AND OBJECTIVE BOX
Patient is a 75y old  Female who presents with a chief complaint of abnormal labs (2021 12:57)      BRIEF HOSPITAL COURSE: 75 year old female PMHx Chronic Respiratory Failure ( Vent dependant s/p Trach / Peg), SAH, Prior Cardiac Arrest with resulting Anoxic Brain Injury, HTN, IDDM2, CVA, GERD, COPD.  Recent hospitalization -10/4 for respiratory failure / sepsis. Admitted from [facility] 2021 for Elevated Renal Indicis  Rx UTI / Dehydration.    Events last 24 hours: On Arrival to unit tonight patient Tachypneic RR > 50 and agitated  Remained HDS and maintaining O2 sats.  Improved with sedation and suctioning.     PAST MEDICAL & SURGICAL HISTORY:  Dementia of frontal lobe type  Aphasic stroke  Diabetes mellitus  Respiratory failure  Hypertension  GERD (gastroesophageal reflux disease)  Constipation  Respiratory failure  CVA (cerebral vascular accident)  HTN (hypertension)  DM (diabetes mellitus)  Advanced dementia  COVID-19 virus detected  Quadriplegia  Pneumonia  Type II diabetes mellitus  Hx of appendectomy  Gastrostomy in place  Tracheostomy in place  Tracheostomy tube present  Feeding by G-tube        Review of Systems: Unable to access     Medications:  ALBUTerol    90 MICROgram(s) HFA Inhaler 2 Puff(s) Inhalation every 6 hours  acetaminophen     Tablet .. 650 milliGRAM(s) Oral every 6 hours PRN  acetaminophen    Suspension .. 650 milliGRAM(s) Oral every 6 hours PRN  LORazepam     Tablet 1 milliGRAM(s) Oral three times a day  LORazepam   Injectable 2 milliGRAM(s) IV Push every 2 hours PRN  LORazepam   Injectable 2 milliGRAM(s) IV Push once  methocarbamol 250 milliGRAM(s) Oral two times a day  aspirin  chewable 81 milliGRAM(s) Oral daily  heparin   Injectable 5000 Unit(s) SubCutaneous every 8 hours  pantoprazole   Suspension 40 milliGRAM(s) Oral daily  polyethylene glycol 3350 17 Gram(s) Oral every 12 hours  senna 2 Tablet(s) Oral at bedtime  simethicone 80 milliGRAM(s) Chew every 6 hours  sucralfate suspension 1 Gram(s) Oral four times a day  dextrose 40% Gel 15 Gram(s) Oral once  dextrose 50% Injectable 25 Gram(s) IV Push once  dextrose 50% Injectable 12.5 Gram(s) IV Push once  dextrose 50% Injectable 25 Gram(s) IV Push once  glucagon  Injectable 1 milliGRAM(s) IntraMuscular once  dextrose 5% + sodium chloride 0.9%. 1000 milliLiter(s) IV Continuous <Continuous>  dextrose 5%. 1000 milliLiter(s) IV Continuous <Continuous>  dextrose 5%. 1000 milliLiter(s) IV Continuous <Continuous>  chlorhexidine 0.12% Liquid 15 milliLiter(s) Oral Mucosa every 12 hours  lactobacillus acidophilus 1 Tablet(s) Oral daily      Mode: PRVC  RR (machine): 18  TV (machine): 400  FiO2: 40  PEEP: 5  ITime: 1  MAP: 13  PIP: 23      ICU Vital Signs Last 24 Hrs  T(C): 37.7 (2021 21:18), Max: 37.7 (2021 21:18)  T(F): 99.8 (2021 21:18), Max: 99.8 (:18)  HR: 86 (:) (57 - 776)  BP: 94/45 (:) (76/39 - 141/65)  BP(mean): 60 (2021 21:) (6 - 106)  RR: 18 (2021 21:) (13 - 40)  SpO2: 100% (2021 21:00) (94% - 100%)          I&O's Detail    2021 08:01  -  2021 07:00  --------------------------------------------------------  IN:    Enteral Tube Flush: 50 mL    Glucerna 1.5: 100 mL    IV PiggyBack: 1000 mL    sodium chloride 0.9%: 500 mL    Sodium Chloride 0.9% Bolus: 500 mL  Total IN: 2150 mL    OUT:  Total OUT: 0 mL    Total NET: 2150 mL      2021 07:01  -  2021 21:51  --------------------------------------------------------  IN:    dextrose 5% + sodium chloride 0.9%: 480 mL    Enteral Tube Flush: 180 mL    Glucerna 1.5: 550 mL    Oral Fluid: 350 mL    sodium chloride 0.9%: 500 mL  Total IN: 2060 mL    OUT:    Voided (mL): 60 mL  Total OUT: 60 mL    Total NET: 2000 mL            LABS:                        8.8    10.59 )-----------( 202      ( 2021 06:39 )             28.8         140  |  102  |  81<H>  ----------------------------<  60<L>  3.3<L>   |  28  |  1.68<H>    Ca    8.3<L>      2021 11:37  Phos  4.1       Mg     3.1         TPro  6.7  /  Alb  2.1<L>  /  TBili  0.6  /  DBili  x   /  AST  141<H>  /  ALT  42  /  AlkPhos  87            CAPILLARY BLOOD GLUCOSE      POCT Blood Glucose.: 214 mg/dL (2021 18:04)    PT/INR - ( 2021 23:11 )   PT: 13.9 sec;   INR: 1.16 ratio         PTT - ( 2021 23:11 )  PTT:42.3 sec  Urinalysis Basic - ( 2021 23:11 )    Color: Yellow / Appearance: Turbid / S.020 / pH: x  Gluc: x / Ketone: Trace  / Bili: Negative / Urobili: 4 mg/dL   Blood: x / Protein: 100 mg/dL / Nitrite: Negative   Leuk Esterase: Moderate / RBC: 3-5 /HPF / WBC 11-25   Sq Epi: x / Non Sq Epi: Few / Bacteria: Occasional      CULTURES:  Rapid RVP Result: NotDetec ( @ 23:12)          Physical Examination:    General:  Sedated now RASS 0     HEENT: Pupils equal, reactive to light.  Symmetric. No JVD.    PULM: Fine scattered rhonchi anteriorly bilaterally, scant  sputum suctioned from ET tube    CVS: S1, S2 Regular rate and rhythm, no murmurs, rubs, or gallops    ABD: Soft, nondistended, nontender, normoactive bowel sounds, no masses    EXT: No edema, Contracted.    SKIN: Warm and well perfused, no rashes noted.        RADIOLOGY: < from: CT Chest No Cont (21 @ 02:52) >    EXAM:  CT CHEST                                  PROCEDURE DATE:  2021          INTERPRETATION:  CLINICAL INFORMATION: Elevated white count. Acute kidney failure    COMPARISON: CT abdomen pelvis 2021. CT chest abdomen and pelvis 2021.    CONTRAST/COMPLICATIONS:  IV Contrast: NONE  Oral Contrast: NONE  Complications: None reported at time of study completion    PROCEDURE:  CT of the Chest was performed.  Sagittal and coronal reformats were performed.    FINDINGS:    LUNGS AND AIRWAYS: Tracheostomy tube in place. Some layering fluid within the trachea. Patent central airways.  Bibasilar atelectasis. Calcific density material in the right lower lobe since the previous chest CT possibly representing aspirated material.  Previously seen bilateral patchy airspace opacities in a perihilar distribution have almost completely resolved since the previous chest CT.  PLEURA: Small left effusion with associated atelectasis not significantly changed. Trace right pleural effusion..  MEDIASTINUM AND JHONNY: Enlarged mediastinal lymph nodes are unchanged since the previous chest CT. Air dilated esophagus, unchanged.  VESSELS: Within normal limits.  HEART: Heart size is normal. No pericardial effusion.  CHEST WALL AND LOWER NECK: Withinnormal limits.  VISUALIZED UPPER ABDOMEN: Within normal limits.  BONES: Degenerative changes.    IMPRESSION:    Bilateral effusions and bibasilar atelectasis not significantly changed. Calcific density within the right lower lobe is new since the previous chest CT, and may represent aspirated material. Developing aspiration pneumonia cannot be excluded. Correlate clinically.    Previously seen bilateral patchy airspace opacities in a perihilar distribution have almost completely resolved since the previous chest CT.    < end of copied text >    < from: US Kidney and Bladder (21 @ 14:18) >  XAM:  US KIDNEYS AND BLADDER                                  PROCEDURE DATE:  2021          INTERPRETATION:  CLINICAL INFORMATION: Acute kidney injury. Evaluate for hydronephrosis.    COMPARISON: Correlation is made abdominal and pelvic CT dated 2021 at 1:00 AM.    TECHNIQUE: Sonography of the kidneys and bladder. Study is technically limited.    FINDINGS:    Right kidney: 9.8 cm. No hydronephrosis.    Left kidney: 10.1 cm. No hydronephrosis. Mildly increased cortical echogenicity.    Urinary bladder: Urinary bladder is moderately distended with estimated volume of 220 cc.    IMPRESSION:    No hydronephrosis.      < end of copied text >    CRITICAL CARE TIME SPENT: 45 minutes   (Assessing presenting problems of acute illness, which pose high probability of life threatening deterioration or end organ damage/dysfunction, as well as medical decision making including initiating plan of care, reviewing data, reviewing radiologic exams, discussing with multidisciplinary team,  discussing goals of care with patient/family, and writing this note.  Non-inclusive of procedures performed)

## 2021-11-07 NOTE — CONSULT NOTE ADULT - ASSESSMENT
75F with chronic resp failure - vent dependent, hx of subarachnoid hemorrhage, hx of cardiac arrest, dementia s/p PEG, HTN, DM2 on insulin, hx of CVA, GERD, COPD, admission here from 9/25 to 10/4 for respiratory failure/sepsis possibly 2/2 aspiration vs vent associated PNA who presents from [facility] for abnormal labs.  Patient was found to have an elevated BUN of 42 and then on repeat BUN of 100.  Was sent here for further evaluation.  In the ED, patient's initial triage vitals were BP 97/60, HR 72, RR 18, 100% on vent and T98.5F.  Labs showed leukocytosis of 13.7, BUN/Cr 104/2.45, lactate 3.3.      RECOMMENDATIONS    Pt is well known to me from prior admissions, afebrile, minimal leukocytosis and without eosinopenia no sputum production, yeast in urine not unexpected with vaughn and significant abx exposure, pulm exam not impressive for ronchi or crackles. Pt certainly at risk of aspiration PNA with repeated aspirations but at this point will recommend observation off antimicrobial Rx. Will send off additional testing to clarify but suspect this admission is being driven by a noninfectious etiology. As far as isolation precautions pt is not producing sputum so would only institute isolation precautions if required per facility protocol.    Noted midline that has been in for unclear duration so fine to use for now but will want to clarify when placed and change if necessary as pt is difficult for IV access    Thank you for consulting us and involving us in the management of this most interesting and challenging case.  We will follow along in the care of this patient. Please call us at [phone] or text me directly on my cell# at [phone] with any concerns.

## 2021-11-07 NOTE — PROVIDER CONTACT NOTE (EICU) - BACKGROUND
called by RN for tachypnia and agitaation, oxygen reqmts and pressor reqmts unchanged, RN told me this has happened before and responded to ativen [facility], I wrote for 1 mg ativan IVOP which did not help, ACP to bedside bagged her easy to bag with some secretions suctioned, will try fentanyl 100 mcg.

## 2021-11-07 NOTE — CONSULT NOTE ADULT - SUBJECTIVE AND OBJECTIVE BOX
Date/Time Patient Seen:  		  Referring MD:   Data Reviewed	       Patient is a 75y old  Female who presents with a chief complaint of abnormal labs (07 Nov 2021 08:04)      Subjective/HPI   h and p reviewed  labs reviewed  er provider note reviewed  well known to me from prior admissions and outpatient     History of Present Illness:  Reason for Admission: abnormal labs  History of Present Illness:   ***Patient with dementia and is not responsive.  Collateral information obtained from chart and notes.***    75F with chronic resp failure - vent dependent, hx of subarachnoid hemorrhage, hx of cardiac arrest, dementia s/p PEG, HTN, DM2 on insulin, hx of CVA, GERD, COPD, admission here from 9/25 to 10/4 for respiratory failure/sepsis possibly 2/2 aspiration vs vent associated PNA who presents from [facility] for abnormal labs.  Unclear as to the reason why labs were done yesterday but patient was found to have an elevated BUN of 42 and then on repeat BUN of 100.  Was sent here for further evaluation.  In the ED, patient's initial triage vitals were BP 97/60, HR 72, RR 18, 100% on vent and T98.5F.  Labs showed leukocytosis of 13.7, BUN/Cr 104/2.45, lactate 3.3.  Patient given 1.8L of NS.  Patient's CT A/P showed no hydronephrosis but showed a calcific density within the right lung which may represent aspirated material (new from previous exam).  Started on vancomycin and zosyn empirically for PNA.  Patient's UA also showed many yeast and was started on fluconazole empirically as well.  Patient's repeat lactate trended down to 1.6 on repeat.  Patient RVP and COVID negative.        Calcific density within the right lung may represent aspirated material. This is new since the previous exam.    Type II diabetes mellitus.     PAST SURGICAL HISTORY:  Feeding by G-tube     Gastrostomy in place     Hx of appendectomy     Tracheostomy in place     Tracheostomy tube present.     Social History:  Social History (marital status, living situation, occupation, tobacco use, alcohol and drug use, and sexual history): , resides in [facility] nursing home facility, unknown smoking/ETOH/drug history.     Tobacco Screening:  · Core Measure Site	No  · Has the patient used tobacco in the past 30 days?	Unable to assess due to patient's cognitive impairment     Risk Assessment:    Present on Admission:  Deep Venous Thrombosis	no   Pulmonary Embolus	no      Heart Failure:  Does this patient have a history of or has been diagnosed with heart failure? no    PAST MEDICAL & SURGICAL HISTORY:  Dementia of frontal lobe type    Aphasic stroke    Diabetes mellitus    Respiratory failure    Hypertension    GERD (gastroesophageal reflux disease)    Constipation    Respiratory failure    CVA (cerebral vascular accident)    HTN (hypertension)    DM (diabetes mellitus)    Advanced dementia    COVID-19 virus detected    Quadriplegia    Pneumonia    Type II diabetes mellitus    Hx of appendectomy    Gastrostomy in place    Tracheostomy in place    Tracheostomy tube present    Feeding by G-tube          Medication list         MEDICATIONS  (STANDING):  ALBUTerol    90 MICROgram(s) HFA Inhaler 2 Puff(s) Inhalation every 6 hours  albuterol/ipratropium for Nebulization 3 milliLiter(s) Nebulizer every 6 hours  aspirin  chewable 81 milliGRAM(s) Oral daily  cefepime   IVPB 2000 milliGRAM(s) IV Intermittent every 24 hours  chlorhexidine 0.12% Liquid 15 milliLiter(s) Oral Mucosa every 12 hours  dextrose 40% Gel 15 Gram(s) Oral once  dextrose 5%. 1000 milliLiter(s) (50 mL/Hr) IV Continuous <Continuous>  dextrose 5%. 1000 milliLiter(s) (100 mL/Hr) IV Continuous <Continuous>  dextrose 50% Injectable 25 Gram(s) IV Push once  dextrose 50% Injectable 12.5 Gram(s) IV Push once  dextrose 50% Injectable 25 Gram(s) IV Push once  fluconAZOLE IVPB 200 milliGRAM(s) IV Intermittent every 24 hours  glucagon  Injectable 1 milliGRAM(s) IntraMuscular once  heparin   Injectable 5000 Unit(s) SubCutaneous every 8 hours  insulin regular  human corrective regimen sliding scale   SubCutaneous every 6 hours  lactobacillus acidophilus 1 Tablet(s) Oral daily  methocarbamol 250 milliGRAM(s) Oral two times a day  pantoprazole   Suspension 40 milliGRAM(s) Oral daily  polyethylene glycol 3350 17 Gram(s) Oral every 12 hours  senna 2 Tablet(s) Oral at bedtime  simethicone 80 milliGRAM(s) Chew every 6 hours  sodium chloride 0.9%. 1000 milliLiter(s) (100 mL/Hr) IV Continuous <Continuous>  sucralfate suspension 1 Gram(s) Oral four times a day  vancomycin  IVPB 750 milliGRAM(s) IV Intermittent every 24 hours    MEDICATIONS  (PRN):  acetaminophen     Tablet .. 650 milliGRAM(s) Oral every 6 hours PRN Mild Pain (1 - 3)  acetaminophen    Suspension .. 650 milliGRAM(s) Oral every 6 hours PRN Temp greater or equal to 38C (100.4F)  LORazepam     Tablet 1 milliGRAM(s) Oral three times a day PRN Agitation         Vitals log        ICU Vital Signs Last 24 Hrs  T(C): 36.3 (07 Nov 2021 03:16), Max: 36.9 (06 Nov 2021 22:21)  T(F): 97.3 (07 Nov 2021 03:16), Max: 98.5 (06 Nov 2021 22:21)  HR: 58 (07 Nov 2021 07:00) (57 - 72)  BP: 93/55 (07 Nov 2021 07:00) (89/47 - 133/56)  BP(mean): 67 (07 Nov 2021 07:00) (6 - 70)  ABP: --  ABP(mean): --  RR: 18 (07 Nov 2021 07:00) (13 - 20)  SpO2: 100% (07 Nov 2021 07:00) (98% - 100%)       Mode: AC/ CMV (Assist Control/ Continuous Mandatory Ventilation)  RR (machine): 18  TV (machine): 400  FiO2: 60  PEEP: 5  ITime: 0.8  MAP: 12  PIP: 32      Input and Output:  I&O's Detail    06 Nov 2021 08:01  -  07 Nov 2021 07:00  --------------------------------------------------------  IN:    Enteral Tube Flush: 50 mL    Glucerna 1.5: 100 mL    IV PiggyBack: 1000 mL    sodium chloride 0.9%: 500 mL    Sodium Chloride 0.9% Bolus: 500 mL  Total IN: 2150 mL    OUT:  Total OUT: 0 mL    Total NET: 2150 mL          Lab Data                        8.8    10.59 )-----------( 202      ( 07 Nov 2021 06:39 )             28.8     11-07    139  |  101  |  86<H>  ----------------------------<  28<LL>  3.2<L>   |  30  |  1.85<H>    Ca    8.1<L>      07 Nov 2021 06:39  Phos  4.1     11-07  Mg     3.1     11-07    TPro  6.7  /  Alb  2.1<L>  /  TBili  0.6  /  DBili  x   /  AST  141<H>  /  ALT  42  /  AlkPhos  87  11-07            Review of Systems	  vent dep    Objective     Physical Examination  heart s1s2 lung dec BS abd sft peg and trach      Pertinent Lab findings & Imaging      Woody:  NO   Adequate UO     I&O's Detail    06 Nov 2021 08:01  -  07 Nov 2021 07:00  --------------------------------------------------------  IN:    Enteral Tube Flush: 50 mL    Glucerna 1.5: 100 mL    IV PiggyBack: 1000 mL    sodium chloride 0.9%: 500 mL    Sodium Chloride 0.9% Bolus: 500 mL  Total IN: 2150 mL    OUT:  Total OUT: 0 mL    Total NET: 2150 mL               Discussed with:     Cultures:	        Radiology    EXAM:  CT CHEST                                  PROCEDURE DATE:  11/07/2021          INTERPRETATION:  CLINICAL INFORMATION: Elevated white count. Acute kidney failure    COMPARISON: CT abdomen pelvis 11/7/2021. CT chest abdomen and pelvis 9/25/2021.    CONTRAST/COMPLICATIONS:  IV Contrast: NONE  Oral Contrast: NONE  Complications: None reported at time of study completion    PROCEDURE:  CT of the Chest was performed.  Sagittal and coronal reformats were performed.    FINDINGS:    LUNGS AND AIRWAYS: Tracheostomy tube in place. Some layering fluid within the trachea. Patent central airways.  Bibasilar atelectasis. Calcific density material in the right lower lobe since the previous chest CT possibly representing aspirated material.  Previously seen bilateral patchy airspace opacities in a perihilar distribution have almost completely resolved since the previous chest CT.  PLEURA: Small left effusion with associated atelectasis not significantly changed. Trace right pleural effusion..  MEDIASTINUM AND JHONNY: Enlarged mediastinal lymph nodes are unchanged since the previous chest CT. Air dilated esophagus, unchanged.  VESSELS: Within normal limits.  HEART: Heart size is normal. No pericardial effusion.  CHEST WALL AND LOWER NECK: Within normal limits.  VISUALIZED UPPER ABDOMEN: Within normal limits.  BONES: Degenerative changes.    IMPRESSION:    Bilateral effusions and bibasilar atelectasis not significantly changed. Calcific density within the right lower lobe is new since the previous chest CT, and may represent aspirated material. Developing aspiration pneumonia cannot be excluded. Correlate clinically.    Previously seen bilateral patchy airspace opacities in a perihilar distribution have almost completely resolved since the previous chest CT.      --- End of Report ---              JOSHUA MERCADO MD; Attending Radiologist  This document has been electronically signed. Nov 7 2021  4:25AM

## 2021-11-07 NOTE — PROVIDER CONTACT NOTE (HYPOGLYCEMIA EVENT) - NS PROVIDER CONTACT BACKGROUND-HYPO
Age: 75y    Gender: Female    POCT Blood Glucose:  56 mg/dL (11-07-21 @ 12:01)  52 mg/dL (11-07-21 @ 11:59)  112 mg/dL (11-07-21 @ 07:34)  176 mg/dL (11-07-21 @ 06:59)  29 mg/dL (11-07-21 @ 06:41)  30 mg/dL (11-07-21 @ 06:39)      eMAR:  dextrose 50% Injectable   25 Gram(s) IV Push (11-07-21 @ 12:10)    dextrose 50% Injectable   25 Gram(s) IV Push (11-07-21 @ 06:45)

## 2021-11-07 NOTE — CONSULT NOTE ADULT - SUBJECTIVE AND OBJECTIVE BOX
Patient is a 75y Female whom presented to the hospital with manasa     PAST MEDICAL & SURGICAL HISTORY:  Dementia of frontal lobe type    Aphasic stroke    Diabetes mellitus    Respiratory failure    Hypertension    GERD (gastroesophageal reflux disease)    Constipation    Respiratory failure    CVA (cerebral vascular accident)    HTN (hypertension)    DM (diabetes mellitus)    Advanced dementia    COVID-19 virus detected    Quadriplegia    Pneumonia    Type II diabetes mellitus    Hx of appendectomy    Gastrostomy in place    Tracheostomy in place    Tracheostomy tube present    Feeding by G-tube        MEDICATIONS  (STANDING):  ALBUTerol    90 MICROgram(s) HFA Inhaler 2 Puff(s) Inhalation every 6 hours  albuterol/ipratropium for Nebulization 3 milliLiter(s) Nebulizer every 6 hours  aspirin  chewable 81 milliGRAM(s) Oral daily  chlorhexidine 0.12% Liquid 15 milliLiter(s) Oral Mucosa every 12 hours  dextrose 40% Gel 15 Gram(s) Oral once  dextrose 5% + sodium chloride 0.9%. 1000 milliLiter(s) (80 mL/Hr) IV Continuous <Continuous>  dextrose 5%. 1000 milliLiter(s) (50 mL/Hr) IV Continuous <Continuous>  dextrose 5%. 1000 milliLiter(s) (100 mL/Hr) IV Continuous <Continuous>  dextrose 50% Injectable 25 Gram(s) IV Push once  dextrose 50% Injectable 12.5 Gram(s) IV Push once  dextrose 50% Injectable 25 Gram(s) IV Push once  glucagon  Injectable 1 milliGRAM(s) IntraMuscular once  heparin   Injectable 5000 Unit(s) SubCutaneous every 8 hours  lactobacillus acidophilus 1 Tablet(s) Oral daily  methocarbamol 250 milliGRAM(s) Oral two times a day  pantoprazole   Suspension 40 milliGRAM(s) Oral daily  polyethylene glycol 3350 17 Gram(s) Oral every 12 hours  senna 2 Tablet(s) Oral at bedtime  simethicone 80 milliGRAM(s) Chew every 6 hours  sucralfate suspension 1 Gram(s) Oral four times a day      Allergies    codeine (Hives)    Intolerances        SOCIAL HISTORY:  Denies ETOh,Smoking,     FAMILY HISTORY:      REVIEW OF SYSTEMS:    CONSTITUTIONAL: No weakness, fevers or chills  RESPIRATORY: No cough, wheezing, hemoptysis; No shortness of breath  CARDIOVASCULAR: No chest pain or palpitations  GASTROINTESTINAL: No abdominal or epigastric pain. No nausea, vomiting,        VITAL:  T(C): , Max: 36.9 (21 @ 22:21)  T(F): , Max: 98.5 (21 @ 22:21)  HR: 72 (21 @ 13:00)  BP: 98/73 (21 @ 13:00)  BP(mean): 82 (21 @ 13:00)  RR: 15 (21 @ 13:00)  SpO2: 100% (21 @ 13:00)  Wt(kg): --    I and O's:     @ 08:01  -   07:00  --------------------------------------------------------  IN: 2150 mL / OUT: 0 mL / NET: 2150 mL     07:01  -   @ 15:59  --------------------------------------------------------  IN: 1060 mL / OUT: 60 mL / NET: 1000 mL      Height (cm): 165.1 ( 22:21)  Weight (kg): 56.4 ( 22:21)  BMI (kg/m2): 20.7 ( 22:21)  BSA (m2): 1.62 ( 22:21)    PHYSICAL EXAM:    Constitutional: NAD  HEENT: conjunctive   clear   Neck:  No JVD  Respiratory: pos for trach    Cardiovascular: S1 and S2  Gastrointestinal: BS+, soft, pos for peg   Extremities: No peripheral edema    LABS:                        8.8    10.59 )-----------(       ( 2021 06:39 )             28.8         140  |  102  |  81<H>  ----------------------------<  60<L>  3.3<L>   |  28  |  1.68<H>    Ca    8.3<L>      2021 11:37  Phos  4.1       Mg     3.1         TPro  6.7  /  Alb  2.1<L>  /  TBili  0.6  /  DBili  x   /  AST  141<H>  /  ALT  42  /  AlkPhos  87        Urine Studies:  Urinalysis Basic - ( 2021 23:11 )    Color: Yellow / Appearance: Turbid / S.020 / pH: x  Gluc: x / Ketone: Trace  / Bili: Negative / Urobili: 4 mg/dL   Blood: x / Protein: 100 mg/dL / Nitrite: Negative   Leuk Esterase: Moderate / RBC: 3-5 /HPF / WBC 11-25   Sq Epi: x / Non Sq Epi: Few / Bacteria: Occasional            RADIOLOGY & ADDITIONAL STUDIES:

## 2021-11-07 NOTE — H&P ADULT - NSHPPHYSICALEXAM_GEN_ALL_CORE
PHYSICAL EXAM:  Vital Signs Last 24 Hrs  T(C): 36.9 (06 Nov 2021 22:21), Max: 36.9 (06 Nov 2021 22:21)  T(F): 98.5 (06 Nov 2021 22:21), Max: 98.5 (06 Nov 2021 22:21)  HR: 64 (07 Nov 2021 01:36) (62 - 72)  BP: 128/54 (07 Nov 2021 01:36) (91/40 - 133/56)  BP(mean): --  RR: 19 (07 Nov 2021 01:36) (13 - 20)  SpO2: 99% (07 Nov 2021 01:36) (98% - 100%)    GENERAL:     chronically ill appearing, emaciated, toxic appearing female with +trach +PEG  HEAD:     atraumatic  EYES:    conjunctiva clear  NECK:     +trach in place, clean  RESPIRATORY:     +vented sounds (coarse sounds), no gross crackles or rales  CARDIOVASCULAR:     regular rate and rhythm, no murmurs or rubs or gallops, 2+ peripheral pulses  GASTROINTESTINAL:     soft, +PEG in place, clean and dry as well, nondistended, no hepatosplenomegaly palpated, bowel sounds present  EXTREMITIES:     no clubbing or cyanosis or edema  MUSCULOSKELETAL:     +diffuse contractures in all joints  NERVOUS SYSTEM:     motor strength intact with 5/5 B/L upper and lower extremities, no gross sensory deficits  SKIN:     necrotic tips of bilateral 1st toes  PSYCH:     non-responsive, barely responds to any stimuli

## 2021-11-07 NOTE — CONSULT NOTE ADULT - SUBJECTIVE AND OBJECTIVE BOX
BREA BECKHAM    [facility]    Allergies    codeine (Hives)    Intolerances        PAST MEDICAL & SURGICAL HISTORY:  Dementia of frontal lobe type    Aphasic stroke    Diabetes mellitus    Respiratory failure    Hypertension    GERD (gastroesophageal reflux disease)    Constipation    Respiratory failure    CVA (cerebral vascular accident)    HTN (hypertension)    DM (diabetes mellitus)    Advanced dementia    COVID-19 virus detected    Quadriplegia    Pneumonia    Type II diabetes mellitus    Hx of appendectomy    Gastrostomy in place    Tracheostomy in place    Tracheostomy tube present    Feeding by G-tube        FAMILY HISTORY:      Home Medications:  acetaminophen 160 mg/5 mL oral suspension: 20.31 milliliter(s) by gastrostomy tube every 6 hours, As Needed (:03)  albuterol 90 mcg/inh inhalation aerosol: 2 puff(s) inhaled every 6 hours (:)  aspirin 81 mg oral tablet, chewable: 1 tab(s) by PEG tube once a day (:03)  Bacid (LAC) oral tablet: 2 tab(s) by gastrostomy tube once a day (:03)  Carafate 1 g/10 mL oral suspension: 10 milliliter(s) by gastrostomy tube 4 times a day (before meals and at bedtime) for 14 days (Started 21) (:03)  chlorhexidine 0.12% mucous membrane liquid: 15 milliliter(s) mucous membrane 2 times a day (:03)  insulin glargine: 36 unit(s) subcutaneous once a day (at bedtime) (:03)  insulin lispro 100 units/mL injectable solution:  injectable; sliding scale coverage  (:03)  ipratropium-albuterol 0.5 mg-2.5 mg/3 mL inhalation solution: 3 milliliter(s) inhaled 4 times a day (:03)  LORazepam 1 mg oral tablet: 1 tab(s) by gastrostomy tube 3 times a day, As Needed (2021 01:48)  methocarbamol: 250 milligram(s) by gastrostomy tube 2 times a day (:03)  pantoprazole 40 mg oral granule, delayed release: 40 milligram(s) by gastrostomy tube 2 times a day (2021 00:03)  polyethylene glycol 3350 oral powder for reconstitution: 17 gram(s) orally every 12 hours (2021 00:03)  senna 8.6 mg oral tablet: 3 tab(s) by gastrostomy tube once a day (at bedtime) (2021 01:48)  simethicone 80 mg oral tablet, chewable: 1 tab(s) orally every 6 hours (2021 00:03)  Tylenol 325 mg oral tablet: 2 tab(s) by gastrostomy tube once a day; 60 minutes prior to dressing change  (2021 01:48)      MEDICATIONS  (STANDING):  ALBUTerol    90 MICROgram(s) HFA Inhaler 2 Puff(s) Inhalation every 6 hours  albuterol/ipratropium for Nebulization 3 milliLiter(s) Nebulizer every 6 hours  aspirin  chewable 81 milliGRAM(s) Oral daily  cefepime   IVPB 2000 milliGRAM(s) IV Intermittent every 24 hours  chlorhexidine 0.12% Liquid 15 milliLiter(s) Oral Mucosa every 12 hours  dextrose 40% Gel 15 Gram(s) Oral once  dextrose 5%. 1000 milliLiter(s) (50 mL/Hr) IV Continuous <Continuous>  dextrose 5%. 1000 milliLiter(s) (100 mL/Hr) IV Continuous <Continuous>  dextrose 50% Injectable 25 Gram(s) IV Push once  dextrose 50% Injectable 12.5 Gram(s) IV Push once  dextrose 50% Injectable 25 Gram(s) IV Push once  fluconAZOLE IVPB 200 milliGRAM(s) IV Intermittent every 24 hours  glucagon  Injectable 1 milliGRAM(s) IntraMuscular once  heparin   Injectable 5000 Unit(s) SubCutaneous every 8 hours  insulin regular  human corrective regimen sliding scale   SubCutaneous every 6 hours  lactobacillus acidophilus 1 Tablet(s) Oral daily  methocarbamol 250 milliGRAM(s) Oral two times a day  pantoprazole   Suspension 40 milliGRAM(s) Oral daily  polyethylene glycol 3350 17 Gram(s) Oral every 12 hours  senna 2 Tablet(s) Oral at bedtime  simethicone 80 milliGRAM(s) Chew every 6 hours  sodium chloride 0.9%. 1000 milliLiter(s) (100 mL/Hr) IV Continuous <Continuous>  sucralfate suspension 1 Gram(s) Oral four times a day  vancomycin  IVPB 750 milliGRAM(s) IV Intermittent every 24 hours    MEDICATIONS  (PRN):  acetaminophen     Tablet .. 650 milliGRAM(s) Oral every 6 hours PRN Mild Pain (1 - 3)  acetaminophen    Suspension .. 650 milliGRAM(s) Oral every 6 hours PRN Temp greater or equal to 38C (100.4F)  LORazepam     Tablet 1 milliGRAM(s) Oral three times a day PRN Agitation      Diet, NPO:   Tube Feeding Modality: Gastrostomy  Glucerna 1.5 Horacio  Total Volume for 24 Hours (mL): 1200  Continuous  Starting Tube Feed Rate mL per Hour: 50  Until Goal Tube Feed Rate (mL per Hour): 50  Tube Feed Duration (in Hours): 24  Tube Feed Start Time: 16:00  Free Water Flush  Bolus   Total Volume per Flush (mL): 300   Frequency: Every 8 Hours (21 @ 01:59) [Active]          Vital Signs Last 24 Hrs  T(C): 36.3 (2021 03:16), Max: 36.9 (2021 22:21)  T(F): 97.3 (2021 03:16), Max: 98.5 (2021 22:21)  HR: 58 (2021 07:00) (57 - 72)  BP: 93/55 (2021 07:00) (89/47 - 133/56)  BP(mean): 67 (2021 07:00) (6 - 70)  RR: 18 (2021 07:00) (13 - 20)  SpO2: 100% (2021 07:00) (98% - 100%)      21 @ 08:01  -  21 @ 07:00  --------------------------------------------------------  IN: 2150 mL / OUT: 0 mL / NET: 2150 mL        Mode: AC/ CMV (Assist Control/ Continuous Mandatory Ventilation), RR (machine): 18, TV (machine): 400, FiO2: 60, PEEP: 5, ITime: 0.8, MAP: 12, PIP: 32      LABS:                        8.8    10.59 )-----------( 202      ( 2021 06:39 )             28.8         139  |  101  |  86<H>  ----------------------------<  28<LL>  3.2<L>   |  30  |  1.85<H>    Ca    8.1<L>      2021 06:39  Phos  4.1       Mg     3.1         TPro  6.7  /  Alb  2.1<L>  /  TBili  0.6  /  DBili  x   /  AST  141<H>  /  ALT  42  /  AlkPhos  87      PT/INR - ( 2021 23:11 )   PT: 13.9 sec;   INR: 1.16 ratio         PTT - ( 2021 23:11 )  PTT:42.3 sec  Urinalysis Basic - ( 2021 23:11 )    Color: Yellow / Appearance: Turbid / S.020 / pH: x  Gluc: x / Ketone: Trace  / Bili: Negative / Urobili: 4 mg/dL   Blood: x / Protein: 100 mg/dL / Nitrite: Negative   Leuk Esterase: Moderate / RBC: 3-5 /HPF / WBC 11-25   Sq Epi: x / Non Sq Epi: Few / Bacteria: Occasional            WBC:  WBC Count: 10.59 K/uL ( @ 06:39)  WBC Count: 13.77 K/uL ( @ 23:11)      MICROBIOLOGY:  RECENT CULTURES:              PT/INR - ( 2021 23:11 )   PT: 13.9 sec;   INR: 1.16 ratio         PTT - ( 2021 23:11 )  PTT:42.3 sec    Sodium:  Sodium, Serum: 139 mmol/L ( @ 06:39)  Sodium, Serum: 139 mmol/L ( @ 23:11)      1.85 mg/dL :39  2.45 mg/dL  @ 23:11      Hemoglobin:  Hemoglobin: 8.8 g/dL ( @ 06:39)  Hemoglobin: 9.6 g/dL ( @ 23:11)      Platelets: Platelet Count - Automated: 202 K/uL ( @ 06:39)  Platelet Count - Automated: 238 K/uL ( @ 23:11)      LIVER FUNCTIONS - ( 2021 06:39 )  Alb: 2.1 g/dL / Pro: 6.7 g/dL / ALK PHOS: 87 U/L / ALT: 42 U/L DA / AST: 141 U/L / GGT: x             Urinalysis Basic - ( 2021 23:11 )    Color: Yellow / Appearance: Turbid / S.020 / pH: x  Gluc: x / Ketone: Trace  / Bili: Negative / Urobili: 4 mg/dL   Blood: x / Protein: 100 mg/dL / Nitrite: Negative   Leuk Esterase: Moderate / RBC: 3-5 /HPF / WBC 11-25   Sq Epi: x / Non Sq Epi: Few / Bacteria: Occasional        RADIOLOGY & ADDITIONAL STUDIES:      MICROBIOLOGY:  RECENT CULTURES:

## 2021-11-07 NOTE — H&P ADULT - NSHPLABSRESULTS_GEN_ALL_CORE
LABS:                        9.6<L>  13.77<H> )-----------( 238      ( 2021 23:11 )             30.8<L>    139    |  92<L>  |  104<H>  ----------------------------<  88       2021 23:11  3.1<L>   |  35<H>  |  2.45<H>        Ca 9.1           2021 23:11        TPro  7.8    /  Alb  2.5<L>  /  TBili  0.6    /  DBili  x      /  AST  133<H>  /  ALT  42     /  AlkPhos  119    2021 23:11    PT/INR - ( 2021 23:11 )   PT: 13.9<H>;   INR: 1.16          PTT - ( 2021 23:11 )  PTT:42.3<H>    Urinalysis Basic - ( 2021 23:11 )    Color: Yellow / Appearance: Turbid / S.020 / pH: x  Gluc: x / Ketone: Trace  / Bili: Negative / Urobili: 4 mg/dL   Blood: x / Protein: 100 mg/dL / Nitrite: Negative   Leuk Esterase: Moderate / RBC: 3-5 /HPF / WBC 11-25   Sq Epi: x / Non Sq Epi: Few / Bacteria: Occasional    EKG:  Radiology:  < from: CT Renal Stone Hunt (21 @ 01:08) >    FINDINGS:  LOWER CHEST: Small bilateral pleural effusions and associated atelectasis. Calcific density within the right lung may represent aspirated material. This is new since the previous exam.    LIVER: Within normal limits.  BILE DUCTS: Normal caliber.  GALLBLADDER: Within normal limits.  SPLEEN: Within normal limits.  PANCREAS: Within normal limits.  ADRENALS: Within normal limits.  KIDNEYS/URETERS: Within normal limits.    BLADDER: The bladder is collapsed around a Woody catheter limiting evaluation..  REPRODUCTIVE ORGANS: Uterus and adnexa within normal limits.    BOWEL: Gastrostomy tube. The rectum is mildly distended with fluid similar to the previous exam. No bowel obstruction. Appendix  PERITONEUM: No ascites.  VESSELS: Within normal limits.  RETROPERITONEUM/LYMPH NODES: No lymphadenopathy.  ABDOMINAL WALL: Within normal limits.  BONES: Degenerative changes. Osteopenia. Posttraumatic changes to the left femur are stable.    IMPRESSION:    No hydronephrosis as clinically questioned.    < end of copied text >

## 2021-11-07 NOTE — H&P ADULT - HISTORY OF PRESENT ILLNESS
***Patient with dementia and is not responsive.  Collateral information obtained from chart and notes.***    75F with chronic resp failure - vent dependent, hx of subarachnoid hemorrhage, hx of cardiac arrest, dementia s/p PEG, HTN, DM2 on insulin, hx of CVA, admission here from 9/25 to 10/4 for respiratory failure/sepsis possibly 2/2 aspiration vs vent associated PNA who presents from [facility] for abnormal labs.  Unclear as to the reason why labs were done yesterday but patient was found to have an elevated BUN of 42 and then on repeat BUN of 100.  Was sent here for further evaluation.  In the ED, patient's initial triage vitals were BP 97/60, HR 72, RR 18, 100% on vent and T98.5F.  Labs showed leukocytosis of 13.7, BUN/Cr 104/2.45, lactate 3.3.  Patient given 1.8L of NS as per sepsis protocol.  Patient's CT A/P showed no hydronephrosis but showed a calcific density within the right lung which may represent aspirated material (new from previous exam).  Started on vancomycin and zosyn empirically for PNA.  Patient's UA also showed many yeast and was started on fluconazole empirically as well.  Patient's repeat lactate trended down to 1.6 on repeat.  Patient RVP and COVID negative.        Calcific density within the right lung may represent aspirated material. This is new since the previous exam. ***Patient with dementia and is not responsive.  Collateral information obtained from chart and notes.***    75F with chronic resp failure - vent dependent, hx of subarachnoid hemorrhage, hx of cardiac arrest, dementia s/p PEG, HTN, DM2 on insulin, hx of CVA, GERD, COPD, admission here from 9/25 to 10/4 for respiratory failure/sepsis possibly 2/2 aspiration vs vent associated PNA who presents from [facility] for abnormal labs.  Unclear as to the reason why labs were done yesterday but patient was found to have an elevated BUN of 42 and then on repeat BUN of 100.  Was sent here for further evaluation.  In the ED, patient's initial triage vitals were BP 97/60, HR 72, RR 18, 100% on vent and T98.5F.  Labs showed leukocytosis of 13.7, BUN/Cr 104/2.45, lactate 3.3.  Patient given 1.8L of NS.  Patient's CT A/P showed no hydronephrosis but showed a calcific density within the right lung which may represent aspirated material (new from previous exam).  Started on vancomycin and zosyn empirically for PNA.  Patient's UA also showed many yeast and was started on fluconazole empirically as well.  Patient's repeat lactate trended down to 1.6 on repeat.  Patient RVP and COVID negative.        Calcific density within the right lung may represent aspirated material. This is new since the previous exam.

## 2021-11-07 NOTE — CHART NOTE - NSCHARTNOTEFT_GEN_A_CORE
Hospitalist Attending Chart Update / Progress Note    Please see H&P written early today by Dr. Christina, for more information.     Pt is nonverbal and cannot provide ROS.    T(C): 36.3 (11-07-21 @ 16:22), Max: 36.9 (11-06-21 @ 22:21)  HR: 76 (11-07-21 @ 17:00) (57 - 776)  BP: 97/49 (11-07-21 @ 17:00) (89/47 - 133/56)  RR: 16 (11-07-21 @ 17:00) (13 - 20)  SpO2: 100% (11-07-21 @ 17:00) (98% - 100%)    On exam: pt is rigid with contracted UEs, does not answer yes/no questions or follow commands, lungs are grossly CTA on vent via trach, abd distended with no sign of abdominal tenderness; PEG in place without sign of infection at entry point.                          8.8    10.59 )-----------( 202      ( 07 Nov 2021 06:39 )             28.8     CBC Full  -  ( 07 Nov 2021 06:39 )  WBC Count : 10.59 K/uL  Hemoglobin : 8.8 g/dL  Hematocrit : 28.8 %  Platelet Count - Automated : 202 K/uL  Mean Cell Volume : 86.0 fl  Mean Cell Hemoglobin : 26.3 pg  Mean Cell Hemoglobin Concentration : 30.6 gm/dL  Auto Neutrophil # : 7.77 K/uL  Auto Lymphocyte # : 2.16 K/uL  Auto Monocyte # : 0.52 K/uL  Auto Eosinophil # : 0.09 K/uL  Auto Basophil # : 0.02 K/uL  Auto Neutrophil % : 73.4 %  Auto Lymphocyte % : 20.4 %  Auto Monocyte % : 4.9 %  Auto Eosinophil % : 0.8 %  Auto Basophil % : 0.2 %    11-07    140  |  102  |  81<H>  ----------------------------<  60<L>  3.3<L>   |  28  |  1.68<H>    Ca    8.3<L>      07 Nov 2021 11:37  Phos  4.1     11-07  Mg     3.1     11-07    TPro  6.7  /  Alb  2.1<L>  /  TBili  0.6  /  DBili  x   /  AST  141<H>  /  ALT  42  /  AlkPhos  87  11-07        74yo F with chronic resp failure - vent dependent, hx of subarachnoid hemorrhage, hx of cardiac arrest, dementia s/p PEG, HTN, DM2 on insulin, hx of CVA, GERD, COPD, admission here from 9/25 to 10/4 for respiratory failure/sepsis possibly 2/2 aspiration vs vent associated PNA who presents from [facility] for RYAN noted on bloodwork, a/w RYAN course c/b hypoglycemia.  # RYAN   - likely pre-renal per discussion with nephro (Barry) given rapid response to IVF  - BUN/Cr down to 81/1.68 from 104/2.45 in 12 hours  - US kidney/bladder without sign of significant retention (bladder volume ~200cc but not a PVR as pt incontinent and cannot follow command to void)    # leukocytosis:  - 13.7 on admission, 10.59 seven hours later ; afebrile   - monitor off Abx per ID (Brendon) recs  - f/up UCx, BCx  - questionable finding on CXR of calcific density; will repeat CXR    # hypoglycemia:  - did not receive insulin in this hospital, so likely from lantus pt received in SNF  - hold all insulin for now as pt had hypoglycemia to 30 in the morning and 50 around noon  - c/w tube feeds and added D5 to the IVF   - monitor FSG and will consider adding some insulin when pt stabilizes  - endo (Perlman) consulted, will see pt tomorrow    # hypokalemia:  - repleted with KCl IV and po via PEG  - monitor BMP    #COPD:  - c/w albuterol  - pulm (Jaime), recs appreciated    #GERD:  - c/w carafate and PPI     # VTE ppx:  - c/w HSQ Hospitalist Attending Chart Update / Progress Note    Please see H&P written early today by Dr. Christina, for more information.     Pt is nonverbal and cannot provide ROS.    T(C): 36.3 (11-07-21 @ 16:22), Max: 36.9 (11-06-21 @ 22:21)  HR: 76 (11-07-21 @ 17:00) (57 - 776)  BP: 97/49 (11-07-21 @ 17:00) (89/47 - 133/56)  RR: 16 (11-07-21 @ 17:00) (13 - 20)  SpO2: 100% (11-07-21 @ 17:00) (98% - 100%)    On exam: pt is rigid with contracted UEs, does not answer yes/no questions or follow commands, lungs are grossly CTA on vent via trach, abd distended with no sign of abdominal tenderness; PEG in place without sign of infection at entry point.                          8.8    10.59 )-----------( 202      ( 07 Nov 2021 06:39 )             28.8     CBC Full  -  ( 07 Nov 2021 06:39 )  WBC Count : 10.59 K/uL  Hemoglobin : 8.8 g/dL  Hematocrit : 28.8 %  Platelet Count - Automated : 202 K/uL  Mean Cell Volume : 86.0 fl  Mean Cell Hemoglobin : 26.3 pg  Mean Cell Hemoglobin Concentration : 30.6 gm/dL  Auto Neutrophil # : 7.77 K/uL  Auto Lymphocyte # : 2.16 K/uL  Auto Monocyte # : 0.52 K/uL  Auto Eosinophil # : 0.09 K/uL  Auto Basophil # : 0.02 K/uL  Auto Neutrophil % : 73.4 %  Auto Lymphocyte % : 20.4 %  Auto Monocyte % : 4.9 %  Auto Eosinophil % : 0.8 %  Auto Basophil % : 0.2 %    11-07    140  |  102  |  81<H>  ----------------------------<  60<L>  3.3<L>   |  28  |  1.68<H>    Ca    8.3<L>      07 Nov 2021 11:37  Phos  4.1     11-07  Mg     3.1     11-07    TPro  6.7  /  Alb  2.1<L>  /  TBili  0.6  /  DBili  x   /  AST  141<H>  /  ALT  42  /  AlkPhos  87  11-07        76yo F with chronic resp failure - vent dependent, hx of subarachnoid hemorrhage, hx of cardiac arrest, dementia s/p PEG, HTN, DM2 on insulin, hx of CVA, GERD, COPD, admission here from 9/25 to 10/4 for respiratory failure/sepsis possibly 2/2 aspiration vs vent associated PNA who presents from [facility] for RYAN noted on bloodwork, a/w RYAN course c/b hypoglycemia.  # RYAN   - likely pre-renal per discussion with nephro (Barry) given rapid response to IVF  - BUN/Cr down to 81/1.68 from 104/2.45 in 12 hours  - c/w IVF with D5 NS at 80cc/hr for now  - monitor BMP  - US kidney/bladder without sign of significant retention (bladder volume ~200cc but not a PVR as pt incontinent and cannot follow command to void)  - brown stool per RN (no sign of UGIB)    # leukocytosis:  - 13.7 on admission, 10.59 seven hours later ; afebrile   - monitor off Abx per ID (Brendon) recs  - f/up UCx, BCx  - questionable finding on CXR of calcific density; will repeat CXR    # hypoglycemia:  - did not receive insulin in this hospital, so likely from lantus pt received in SNF  - hold all insulin for now as pt had hypoglycemia to 30 in the morning and 50 around noon  - c/w tube feeds and added D5 to the IVF   - monitor FSG and will consider adding some insulin when pt stabilizes  - endo (Perlman) consulted, will see pt tomorrow    # hypokalemia:  - repleted with KCl IV and po via PEG  - monitor BMP    #COPD:  - c/w albuterol  - pulm (Jaime), recs appreciated    #GERD:  - c/w carafate and PPI     # VTE ppx:  - c/w HSQ

## 2021-11-07 NOTE — CONSULT NOTE ADULT - SUBJECTIVE AND OBJECTIVE BOX
75y  Female  codeine (Hives)    Patient is a 75y old  Female who presents with a chief complaint of abnormal labs (2021 01:30)    HPI:   75 year old female known to this facility who has PMHX of chronic respiratory failure and is vent dependent, SAH hx of cardiac arrest, dementia s/p PEG, HTN, DM2 on insulin, CVA, GERD, COPD, admission here from  to 10/4 for respiratory failure/sepsis  thouhgt to be aspiration. She presents from a local skilled nursing facility for elevated BUN/CR.  In the ER Labs showed leukocytosis of 13.7, BUN/Cr 104/2.45, lactate 3.3.  Patient given 1.8L of NS.  Patient's CT A/P showed no hydronephrosis but showed a calcific density within the right lung which may represent aspirated material (new from previous exam).  Started on vancomycin and zosyn empirically for PNA.  Patient's UA also showed many yeast and was started on fluconazole empirically as well.  Patient's repeat lactate trended down to 1.6 on repeat.  Patient RVP and COVID negative. Calcific density within the right lung may represent aspirated material. She is hemodynamically stable  no signs of shock, initially had a elevated lactate in the ER but responded to fluids and as stated normalized.       PAST MEDICAL & SURGICAL HISTORY:  Dementia of frontal lobe type  Aphasic stroke  Diabetes mellitus  Respiratory failure  Hpertension  GERD (gastroesophageal reflux disease)  Constipation  Respiratory failure  CVA (cerebral vascular accident)  HTN (hypertension)  DM (diabetes mellitus)  Advanced dementia  COVID-19 virus detected  Quadriplegia  Pneumonia  Type II diabetes mellitus  Hx of appendectomy  Gastrostomy in place  Tracheostomy in place  Tracheostomy tube present  Feeding by G-tube    Vital Signs Last 24 Hrs  T(C): 36.3 (2021 03:16), Max: 36.9 (2021 22:21)  T(F): 97.3 (2021 03:16), Max: 98.5 (2021 22:21)  HR: 58 (2021 05:17) (57 - 72)  BP: 97/48 (2021 05:00) (89/47 - 133/56)  BP(mean): 63 (2021 05:00) (6 - 64)  RR: 18 (2021 05:00) (13 - 20)  SpO2: 100% (2021 05:17) (98% - 100%)    I&O's Summary  2021 08:01  -  2021 05:54  --------------------------------------------------------  IN: 1800 mL / OUT: 0 mL / NET: 1800 mL    LABS      139  |  92<L>  |  104<H>  ----------------------------<  88  3.1<L>   |  35<H>  |  2.45<H>  Ca    9.1      2021 23:11  TPro  7.8  /  Alb  2.5<L>  /  TBili  0.6  /  DBili  x   /  AST  133<H>  /  ALT  42  /  AlkPhos  119                        9.6    13.77 )-----------( 238      ( 2021 23:11 )             30.8   PT/INR - ( 2021 23:11 )   PT: 13.9 sec;   INR: 1.16 ratio    PTT - ( 2021 23:11 )  PTT:42.3 sec    LIVER FUNCTIONS - ( 2021 23:11 )  Alb: 2.5 g/dL / Pro: 7.8 g/dL / ALK PHOS: 119 U/L / ALT: 42 U/L DA / AST: 133 U/L / GGT: x           Urinalysis Basic - ( 2021 23:11 )  Color: Yellow / Appearance: Turbid / S.020 / pH: x  Gluc: x / Ketone: Trace  / Bili: Negative / Urobili: 4 mg/dL   Blood: x / Protein: 100 mg/dL / Nitrite: Negative   Leuk Esterase: Moderate / RBC: 3-5 /HPF / WBC 11-25   Sq Epi: x / Non Sq Epi: Few / Bacteria: Occasional    VENT SETTINGS   Mode: AC/ CMV (Assist Control/ Continuous Mandatory Ventilation)  RR (machine): 18  TV (machine): 400  FiO2: 60  PEEP: 5  ITime: 0.8  MAP: 12  PIP: 32    MEDICATIONS  (STANDING):  ALBUTerol    90 MICROgram(s) HFA Inhaler 2 Puff(s) Inhalation every 6 hours  albuterol/ipratropium for Nebulization 3 milliLiter(s) Nebulizer every 6 hours  aspirin  chewable 81 milliGRAM(s) Oral daily  cefepime   IVPB 2000 milliGRAM(s) IV Intermittent every 24 hours  chlorhexidine 0.12% Liquid 15 milliLiter(s) Oral Mucosa every 12 hours  dextrose 40% Gel 15 Gram(s) Oral once  dextrose 5%. 1000 milliLiter(s) (50 mL/Hr) IV Continuous <Continuous>  dextrose 5%. 1000 milliLiter(s) (100 mL/Hr) IV Continuous <Continuous>  dextrose 50% Injectable 25 Gram(s) IV Push once  dextrose 50% Injectable 12.5 Gram(s) IV Push once  dextrose 50% Injectable 25 Gram(s) IV Push once  fluconAZOLE IVPB 200 milliGRAM(s) IV Intermittent every 24 hours  glucagon  Injectable 1 milliGRAM(s) IntraMuscular once  heparin   Injectable 5000 Unit(s) SubCutaneous every 8 hours  insulin glargine Injectable (LANTUS) 36 Unit(s) SubCutaneous at bedtime  insulin regular  human corrective regimen sliding scale   SubCutaneous every 6 hours  lactobacillus acidophilus 1 Tablet(s) Oral daily  methocarbamol 250 milliGRAM(s) Oral two times a day  pantoprazole   Suspension 40 milliGRAM(s) Oral daily  polyethylene glycol 3350 17 Gram(s) Oral every 12 hours  potassium chloride  10 mEq/100 mL IVPB 10 milliEquivalent(s) IV Intermittent every 1 hour  senna 2 Tablet(s) Oral at bedtime  simethicone 80 milliGRAM(s) Chew every 6 hours  sodium chloride 0.9%. 1000 milliLiter(s) (100 mL/Hr) IV Continuous <Continuous>  sucralfate suspension 1 Gram(s) Oral four times a day  vancomycin  IVPB 750 milliGRAM(s) IV Intermittent every 24 hours    REVIEW OF SYSTEMS:    Unable to obtain due to mechnical ventilation, poor mental status     Physicial Exam:     HEENT: PERRLA, EOMI, no drainage or redness  Neck: No bruits; no thyromegaly or nodules,  No JVD  Respiratory: Breath Sounds equal & clear to percussion & auscultation, no accessory muscle use  Cardiovascular: Regular rate & rhythm, normal S1, S2; no murmurs, gallops or rubs; no S3, S4  Gastrointestinal: Soft, non-tender, non distended no hepatosplenomegaly, normal bowel sounds  Extremities: No peripheral edema, No cyanosis, clubbing   Vascular: Equal and normal pulses: 2+ peripheral pulses throughout  Neurological: GCS:    A&O x 3; no sensory, motor  deficits, normal reflexes  Psychiatric: Normal mood, normal affect  Musculoskeletal: No joint pain, swelling or deformity; no limitation of movement  Skin: No rashes             75y  Female  codeine (Hives)    Patient is a 75y old  Female who presents with a chief complaint of abnormal labs (2021 01:30)    HPI:   75 year old female known to this facility who has PMHX of chronic respiratory failure and is vent dependent, SAH hx of cardiac arrest, dementia s/p PEG, HTN, DM2 on insulin, CVA, GERD, COPD, admission here from  to 10/4 for respiratory failure/sepsis  thouhgt to be aspiration. She presents from a local skilled nursing facility for elevated BUN/CR.  In the ER Labs showed leukocytosis of 13.7, BUN/Cr 104/2.45, lactate 3.3.  Patient given 1.8L of NS.  Patient's CT A/P showed no hydronephrosis but showed a calcific density within the right lung which may represent aspirated material (new from previous exam).  Started on vancomycin and zosyn empirically for PNA.  Patient's UA also showed  yeast and started on fluconazole.  Patient's repeat lactate trended down to 1.6 on repeat.  Patient RVP and COVID negative. Calcific density within the right lung may represent aspirated material. She is hemodynamically stable  no signs of shock, initially had a elevated lactate in the ER but responded to fluids and as stated normalized.       PAST MEDICAL & SURGICAL HISTORY:  Dementia of frontal lobe type  Aphasic stroke  Diabetes mellitus  Respiratory failure  Hpertension  GERD (gastroesophageal reflux disease)  Constipation  Respiratory failure  CVA (cerebral vascular accident)  HTN (hypertension)  DM (diabetes mellitus)  Advanced dementia  COVID-19 virus detected  Quadriplegia  Pneumonia  Type II diabetes mellitus  Hx of appendectomy  Gastrostomy in place  Tracheostomy in place  Tracheostomy tube present  Feeding by G-tube    Vital Signs Last 24 Hrs  T(C): 36.3 (2021 03:16), Max: 36.9 (2021 22:21)  T(F): 97.3 (2021 03:16), Max: 98.5 (2021 22:21)  HR: 58 (2021 05:17) (57 - 72)  BP: 97/48 (2021 05:00) (89/47 - 133/56)  BP(mean): 63 (2021 05:00) (6 - 64)  RR: 18 (2021 05:00) (13 - 20)  SpO2: 100% (2021 05:17) (98% - 100%)    I&O's Summary  2021 08:01  -  2021 05:54  --------------------------------------------------------  IN: 1800 mL / OUT: 0 mL / NET: 1800 mL    LABS      139  |  92<L>  |  104<H>  ----------------------------<  88  3.1<L>   |  35<H>  |  2.45<H>  Ca    9.1      2021 23:11  TPro  7.8  /  Alb  2.5<L>  /  TBili  0.6  /  DBili  x   /  AST  133<H>  /  ALT  42  /  AlkPhos  119                        9.6    13.77 )-----------( 238      ( 2021 23:11 )             30.8   PT/INR - ( 2021 23:11 )   PT: 13.9 sec;   INR: 1.16 ratio    PTT - ( 2021 23:11 )  PTT:42.3 sec    LIVER FUNCTIONS - ( 2021 23:11 )  Alb: 2.5 g/dL / Pro: 7.8 g/dL / ALK PHOS: 119 U/L / ALT: 42 U/L DA / AST: 133 U/L / GGT: x           Urinalysis Basic - ( 2021 23:11 )  Color: Yellow / Appearance: Turbid / S.020 / pH: x  Gluc: x / Ketone: Trace  / Bili: Negative / Urobili: 4 mg/dL   Blood: x / Protein: 100 mg/dL / Nitrite: Negative   Leuk Esterase: Moderate / RBC: 3-5 /HPF / WBC 11-25   Sq Epi: x / Non Sq Epi: Few / Bacteria: Occasional    VENT SETTINGS   Mode: AC/ CMV (Assist Control/ Continuous Mandatory Ventilation)  RR (machine): 18  TV (machine): 400  FiO2: 60  PEEP: 5  ITime: 0.8  MAP: 12  PIP: 32    MEDICATIONS  (STANDING):  ALBUTerol    90 MICROgram(s) HFA Inhaler 2 Puff(s) Inhalation every 6 hours  albuterol/ipratropium for Nebulization 3 milliLiter(s) Nebulizer every 6 hours  aspirin  chewable 81 milliGRAM(s) Oral daily  cefepime   IVPB 2000 milliGRAM(s) IV Intermittent every 24 hours  chlorhexidine 0.12% Liquid 15 milliLiter(s) Oral Mucosa every 12 hours  dextrose 40% Gel 15 Gram(s) Oral once  dextrose 5%. 1000 milliLiter(s) (50 mL/Hr) IV Continuous <Continuous>  dextrose 5%. 1000 milliLiter(s) (100 mL/Hr) IV Continuous <Continuous>  dextrose 50% Injectable 25 Gram(s) IV Push once  dextrose 50% Injectable 12.5 Gram(s) IV Push once  dextrose 50% Injectable 25 Gram(s) IV Push once  fluconAZOLE IVPB 200 milliGRAM(s) IV Intermittent every 24 hours  glucagon  Injectable 1 milliGRAM(s) IntraMuscular once  heparin   Injectable 5000 Unit(s) SubCutaneous every 8 hours  insulin glargine Injectable (LANTUS) 36 Unit(s) SubCutaneous at bedtime  insulin regular  human corrective regimen sliding scale   SubCutaneous every 6 hours  lactobacillus acidophilus 1 Tablet(s) Oral daily  methocarbamol 250 milliGRAM(s) Oral two times a day  pantoprazole   Suspension 40 milliGRAM(s) Oral daily  polyethylene glycol 3350 17 Gram(s) Oral every 12 hours  potassium chloride  10 mEq/100 mL IVPB 10 milliEquivalent(s) IV Intermittent every 1 hour  senna 2 Tablet(s) Oral at bedtime  simethicone 80 milliGRAM(s) Chew every 6 hours  sodium chloride 0.9%. 1000 milliLiter(s) (100 mL/Hr) IV Continuous <Continuous>  sucralfate suspension 1 Gram(s) Oral four times a day  vancomycin  IVPB 750 milliGRAM(s) IV Intermittent every 24 hours    REVIEW OF SYSTEMS:    Unable to obtain due to mechnical ventilation, poor mental status     Physicial Exam:     HEENT: PERRLA, EOMI, no drainage or redness  Neck: No bruits; no thyromegaly or nodules,  No JVD  Respiratory: Breath Sounds equal & clear to percussion & auscultation, no accessory muscle use  Cardiovascular: Regular rate & rhythm, normal S1, S2; no murmurs, gallops or rubs; no S3, S4  Gastrointestinal: Soft, non-tender, non distended no hepatosplenomegaly, normal bowel sounds  Extremities: No peripheral edema, No cyanosis, clubbing   Vascular: Equal and normal pulses: 2+ peripheral pulses throughout  Neurological: GCS:    A&O x 3; no sensory, motor  deficits, normal reflexes  Psychiatric: Normal mood, normal affect  Musculoskeletal: No joint pain, swelling or deformity; no limitation of movement  Skin: No rashes

## 2021-11-08 LAB
ALBUMIN SERPL ELPH-MCNC: 2.2 G/DL — LOW (ref 3.3–5)
ALP SERPL-CCNC: 96 U/L — SIGNIFICANT CHANGE UP (ref 30–120)
ALT FLD-CCNC: 42 U/L DA — SIGNIFICANT CHANGE UP (ref 10–60)
ANION GAP SERPL CALC-SCNC: 10 MMOL/L — SIGNIFICANT CHANGE UP (ref 5–17)
ANION GAP SERPL CALC-SCNC: 9 MMOL/L — SIGNIFICANT CHANGE UP (ref 5–17)
AST SERPL-CCNC: 83 U/L — HIGH (ref 10–40)
BASOPHILS # BLD AUTO: 0.02 K/UL — SIGNIFICANT CHANGE UP (ref 0–0.2)
BASOPHILS NFR BLD AUTO: 0.3 % — SIGNIFICANT CHANGE UP (ref 0–2)
BILIRUB SERPL-MCNC: 0.4 MG/DL — SIGNIFICANT CHANGE UP (ref 0.2–1.2)
BUN SERPL-MCNC: 56 MG/DL — HIGH (ref 7–23)
BUN SERPL-MCNC: 67 MG/DL — HIGH (ref 7–23)
CALCIUM SERPL-MCNC: 8.4 MG/DL — SIGNIFICANT CHANGE UP (ref 8.4–10.5)
CALCIUM SERPL-MCNC: 8.5 MG/DL — SIGNIFICANT CHANGE UP (ref 8.4–10.5)
CHLORIDE SERPL-SCNC: 107 MMOL/L — SIGNIFICANT CHANGE UP (ref 96–108)
CHLORIDE SERPL-SCNC: 110 MMOL/L — HIGH (ref 96–108)
CO2 SERPL-SCNC: 26 MMOL/L — SIGNIFICANT CHANGE UP (ref 22–31)
CO2 SERPL-SCNC: 30 MMOL/L — SIGNIFICANT CHANGE UP (ref 22–31)
COVID-19 NUCLEOCAPSID GAM AB INTERP: POSITIVE
COVID-19 NUCLEOCAPSID TOTAL GAM ANTIBODY RESULT: 43.5 INDEX — HIGH
COVID-19 SPIKE DOMAIN AB INTERP: POSITIVE
COVID-19 SPIKE DOMAIN ANTIBODY RESULT: >250 U/ML — HIGH
CREAT SERPL-MCNC: 1.35 MG/DL — HIGH (ref 0.5–1.3)
CREAT SERPL-MCNC: 1.54 MG/DL — HIGH (ref 0.5–1.3)
EOSINOPHIL # BLD AUTO: 0.07 K/UL — SIGNIFICANT CHANGE UP (ref 0–0.5)
EOSINOPHIL NFR BLD AUTO: 0.9 % — SIGNIFICANT CHANGE UP (ref 0–6)
GLUCOSE SERPL-MCNC: 220 MG/DL — HIGH (ref 70–99)
GLUCOSE SERPL-MCNC: 223 MG/DL — HIGH (ref 70–99)
GRAM STN FLD: SIGNIFICANT CHANGE UP
HCT VFR BLD CALC: 25.5 % — LOW (ref 34.5–45)
HGB BLD-MCNC: 7.6 G/DL — LOW (ref 11.5–15.5)
IMM GRANULOCYTES NFR BLD AUTO: 0.1 % — SIGNIFICANT CHANGE UP (ref 0–1.5)
INR BLD: 1.17 RATIO — HIGH (ref 0.88–1.16)
LYMPHOCYTES # BLD AUTO: 1.14 K/UL — SIGNIFICANT CHANGE UP (ref 1–3.3)
LYMPHOCYTES # BLD AUTO: 14.6 % — SIGNIFICANT CHANGE UP (ref 13–44)
MCHC RBC-ENTMCNC: 26.4 PG — LOW (ref 27–34)
MCHC RBC-ENTMCNC: 29.8 GM/DL — LOW (ref 32–36)
MCV RBC AUTO: 88.5 FL — SIGNIFICANT CHANGE UP (ref 80–100)
MONOCYTES # BLD AUTO: 0.51 K/UL — SIGNIFICANT CHANGE UP (ref 0–0.9)
MONOCYTES NFR BLD AUTO: 6.5 % — SIGNIFICANT CHANGE UP (ref 2–14)
MRSA PCR RESULT.: SIGNIFICANT CHANGE UP
NEUTROPHILS # BLD AUTO: 6.05 K/UL — SIGNIFICANT CHANGE UP (ref 1.8–7.4)
NEUTROPHILS NFR BLD AUTO: 77.6 % — HIGH (ref 43–77)
NRBC # BLD: 0 /100 WBCS — SIGNIFICANT CHANGE UP (ref 0–0)
NT-PROBNP SERPL-SCNC: 4474 PG/ML — HIGH (ref 0–450)
PHOSPHATE SERPL-MCNC: 2.9 MG/DL — SIGNIFICANT CHANGE UP (ref 2.5–4.5)
PLATELET # BLD AUTO: 180 K/UL — SIGNIFICANT CHANGE UP (ref 150–400)
POTASSIUM SERPL-MCNC: 3.6 MMOL/L — SIGNIFICANT CHANGE UP (ref 3.5–5.3)
POTASSIUM SERPL-MCNC: 3.8 MMOL/L — SIGNIFICANT CHANGE UP (ref 3.5–5.3)
POTASSIUM SERPL-SCNC: 3.6 MMOL/L — SIGNIFICANT CHANGE UP (ref 3.5–5.3)
POTASSIUM SERPL-SCNC: 3.8 MMOL/L — SIGNIFICANT CHANGE UP (ref 3.5–5.3)
PROCALCITONIN SERPL-MCNC: 17.6 NG/ML — HIGH (ref 0.02–0.1)
PROT SERPL-MCNC: 6.7 G/DL — SIGNIFICANT CHANGE UP (ref 6–8.3)
PROTHROM AB SERPL-ACNC: 14.1 SEC — HIGH (ref 10.6–13.6)
RBC # BLD: 2.88 M/UL — LOW (ref 3.8–5.2)
RBC # FLD: 16.7 % — HIGH (ref 10.3–14.5)
S AUREUS DNA NOSE QL NAA+PROBE: SIGNIFICANT CHANGE UP
SARS-COV-2 IGG+IGM SERPL QL IA: 43.5 INDEX — HIGH
SARS-COV-2 IGG+IGM SERPL QL IA: >250 U/ML — HIGH
SARS-COV-2 IGG+IGM SERPL QL IA: POSITIVE
SARS-COV-2 IGG+IGM SERPL QL IA: POSITIVE
SODIUM SERPL-SCNC: 146 MMOL/L — HIGH (ref 135–145)
SODIUM SERPL-SCNC: 146 MMOL/L — HIGH (ref 135–145)
SPECIMEN SOURCE: SIGNIFICANT CHANGE UP
WBC # BLD: 7.8 K/UL — SIGNIFICANT CHANGE UP (ref 3.8–10.5)
WBC # FLD AUTO: 7.8 K/UL — SIGNIFICANT CHANGE UP (ref 3.8–10.5)

## 2021-11-08 PROCEDURE — 99233 SBSQ HOSP IP/OBS HIGH 50: CPT

## 2021-11-08 RX ORDER — SODIUM CHLORIDE 9 MG/ML
1000 INJECTION, SOLUTION INTRAVENOUS
Refills: 0 | Status: DISCONTINUED | OUTPATIENT
Start: 2021-11-08 | End: 2021-11-09

## 2021-11-08 RX ORDER — SODIUM CHLORIDE 9 MG/ML
1000 INJECTION, SOLUTION INTRAVENOUS
Refills: 0 | Status: DISCONTINUED | OUTPATIENT
Start: 2021-11-08 | End: 2021-11-08

## 2021-11-08 RX ADMIN — Medication 81 MILLIGRAM(S): at 13:05

## 2021-11-08 RX ADMIN — Medication 1 MILLIGRAM(S): at 05:21

## 2021-11-08 RX ADMIN — SENNA PLUS 2 TABLET(S): 8.6 TABLET ORAL at 21:17

## 2021-11-08 RX ADMIN — CHLORHEXIDINE GLUCONATE 15 MILLILITER(S): 213 SOLUTION TOPICAL at 05:21

## 2021-11-08 RX ADMIN — Medication 1 MILLIGRAM(S): at 21:17

## 2021-11-08 RX ADMIN — Medication 650 MILLIGRAM(S): at 11:00

## 2021-11-08 RX ADMIN — PANTOPRAZOLE SODIUM 40 MILLIGRAM(S): 20 TABLET, DELAYED RELEASE ORAL at 13:05

## 2021-11-08 RX ADMIN — Medication 1 GRAM(S): at 13:05

## 2021-11-08 RX ADMIN — Medication 650 MILLIGRAM(S): at 21:23

## 2021-11-08 RX ADMIN — Medication 1 GRAM(S): at 05:22

## 2021-11-08 RX ADMIN — Medication 1 MILLIGRAM(S): at 13:16

## 2021-11-08 RX ADMIN — ALBUTEROL 2 PUFF(S): 90 AEROSOL, METERED ORAL at 20:34

## 2021-11-08 RX ADMIN — Medication 650 MILLIGRAM(S): at 09:54

## 2021-11-08 RX ADMIN — POLYETHYLENE GLYCOL 3350 17 GRAM(S): 17 POWDER, FOR SOLUTION ORAL at 17:57

## 2021-11-08 RX ADMIN — METHOCARBAMOL 250 MILLIGRAM(S): 500 TABLET, FILM COATED ORAL at 17:57

## 2021-11-08 RX ADMIN — Medication 1 GRAM(S): at 23:21

## 2021-11-08 RX ADMIN — ALBUTEROL 2 PUFF(S): 90 AEROSOL, METERED ORAL at 07:49

## 2021-11-08 RX ADMIN — SODIUM CHLORIDE 80 MILLILITER(S): 9 INJECTION, SOLUTION INTRAVENOUS at 13:05

## 2021-11-08 RX ADMIN — POLYETHYLENE GLYCOL 3350 17 GRAM(S): 17 POWDER, FOR SOLUTION ORAL at 05:21

## 2021-11-08 RX ADMIN — CHLORHEXIDINE GLUCONATE 15 MILLILITER(S): 213 SOLUTION TOPICAL at 17:57

## 2021-11-08 RX ADMIN — SODIUM CHLORIDE 80 MILLILITER(S): 9 INJECTION, SOLUTION INTRAVENOUS at 17:58

## 2021-11-08 RX ADMIN — Medication 650 MILLIGRAM(S): at 21:22

## 2021-11-08 RX ADMIN — METHOCARBAMOL 250 MILLIGRAM(S): 500 TABLET, FILM COATED ORAL at 05:22

## 2021-11-08 RX ADMIN — SODIUM CHLORIDE 80 MILLILITER(S): 9 INJECTION, SOLUTION INTRAVENOUS at 01:19

## 2021-11-08 RX ADMIN — SIMETHICONE 80 MILLIGRAM(S): 80 TABLET, CHEWABLE ORAL at 13:05

## 2021-11-08 RX ADMIN — SIMETHICONE 80 MILLIGRAM(S): 80 TABLET, CHEWABLE ORAL at 05:22

## 2021-11-08 RX ADMIN — SIMETHICONE 80 MILLIGRAM(S): 80 TABLET, CHEWABLE ORAL at 17:57

## 2021-11-08 RX ADMIN — SIMETHICONE 80 MILLIGRAM(S): 80 TABLET, CHEWABLE ORAL at 23:21

## 2021-11-08 RX ADMIN — ALBUTEROL 2 PUFF(S): 90 AEROSOL, METERED ORAL at 13:45

## 2021-11-08 RX ADMIN — Medication 1 TABLET(S): at 13:05

## 2021-11-08 RX ADMIN — HEPARIN SODIUM 5000 UNIT(S): 5000 INJECTION INTRAVENOUS; SUBCUTANEOUS at 13:16

## 2021-11-08 RX ADMIN — HEPARIN SODIUM 5000 UNIT(S): 5000 INJECTION INTRAVENOUS; SUBCUTANEOUS at 05:22

## 2021-11-08 RX ADMIN — Medication 1 GRAM(S): at 17:57

## 2021-11-08 RX ADMIN — HEPARIN SODIUM 5000 UNIT(S): 5000 INJECTION INTRAVENOUS; SUBCUTANEOUS at 21:20

## 2021-11-08 NOTE — PROGRESS NOTE ADULT - SUBJECTIVE AND OBJECTIVE BOX
Date/Time Patient Seen:  		  Referring MD:   Data Reviewed	       Patient is a 75y old  Female who presents with a chief complaint of abnormal labs (07 Nov 2021 21:46)      Subjective/HPI     PAST MEDICAL & SURGICAL HISTORY:  Dementia of frontal lobe type    Aphasic stroke    Diabetes mellitus    Respiratory failure    Hypertension    GERD (gastroesophageal reflux disease)    Constipation    Respiratory failure    CVA (cerebral vascular accident)    HTN (hypertension)    DM (diabetes mellitus)    Advanced dementia    COVID-19 virus detected    Quadriplegia    Pneumonia    Type II diabetes mellitus    Hx of appendectomy    Gastrostomy in place    Tracheostomy in place    Tracheostomy tube present    Feeding by G-tube          Medication list         MEDICATIONS  (STANDING):  ALBUTerol    90 MICROgram(s) HFA Inhaler 2 Puff(s) Inhalation every 6 hours  aspirin  chewable 81 milliGRAM(s) Oral daily  chlorhexidine 0.12% Liquid 15 milliLiter(s) Oral Mucosa every 12 hours  dextrose 40% Gel 15 Gram(s) Oral once  dextrose 5% + sodium chloride 0.9%. 1000 milliLiter(s) (80 mL/Hr) IV Continuous <Continuous>  dextrose 5%. 1000 milliLiter(s) (50 mL/Hr) IV Continuous <Continuous>  dextrose 5%. 1000 milliLiter(s) (100 mL/Hr) IV Continuous <Continuous>  dextrose 50% Injectable 25 Gram(s) IV Push once  dextrose 50% Injectable 12.5 Gram(s) IV Push once  dextrose 50% Injectable 25 Gram(s) IV Push once  glucagon  Injectable 1 milliGRAM(s) IntraMuscular once  heparin   Injectable 5000 Unit(s) SubCutaneous every 8 hours  lactobacillus acidophilus 1 Tablet(s) Oral daily  LORazepam     Tablet 1 milliGRAM(s) Oral three times a day  LORazepam   Injectable 2 milliGRAM(s) IV Push once  methocarbamol 250 milliGRAM(s) Oral two times a day  pantoprazole   Suspension 40 milliGRAM(s) Oral daily  polyethylene glycol 3350 17 Gram(s) Oral every 12 hours  senna 2 Tablet(s) Oral at bedtime  simethicone 80 milliGRAM(s) Chew every 6 hours  sucralfate suspension 1 Gram(s) Oral four times a day    MEDICATIONS  (PRN):  acetaminophen     Tablet .. 650 milliGRAM(s) Oral every 6 hours PRN Mild Pain (1 - 3)  acetaminophen    Suspension .. 650 milliGRAM(s) Oral every 6 hours PRN Temp greater or equal to 38C (100.4F)  LORazepam   Injectable 2 milliGRAM(s) IV Push every 2 hours PRN Aggitation / Vent Compliance / Tachypnea > 30         Vitals log        ICU Vital Signs Last 24 Hrs  T(C): 37.7 (08 Nov 2021 05:16), Max: 37.7 (07 Nov 2021 21:18)  T(F): 99.8 (08 Nov 2021 05:16), Max: 99.8 (07 Nov 2021 21:18)  HR: 76 (08 Nov 2021 06:00) (58 - 776)  BP: 106/55 (08 Nov 2021 06:00) (76/39 - 141/65)  BP(mean): 70 (08 Nov 2021 06:00) (50 - 106)  ABP: --  ABP(mean): --  RR: 16 (08 Nov 2021 06:00) (11 - 40)  SpO2: 100% (08 Nov 2021 06:00) (94% - 100%)       Mode: PRVC  RR (machine): 18  TV (machine): 400  FiO2: 40  PEEP: 5  ITime: 1  MAP: 14  PIP: 32      Input and Output:  I&O's Detail    06 Nov 2021 08:01  -  07 Nov 2021 07:00  --------------------------------------------------------  IN:    Enteral Tube Flush: 50 mL    Glucerna 1.5: 100 mL    IV PiggyBack: 1000 mL    sodium chloride 0.9%: 500 mL    Sodium Chloride 0.9% Bolus: 500 mL  Total IN: 2150 mL    OUT:  Total OUT: 0 mL    Total NET: 2150 mL      07 Nov 2021 07:01  -  08 Nov 2021 06:58  --------------------------------------------------------  IN:    dextrose 5% + sodium chloride 0.9%: 1280 mL    Enteral Tube Flush: 270 mL    Glucerna 1.5: 1050 mL    Oral Fluid: 350 mL    sodium chloride 0.9%: 500 mL  Total IN: 3450 mL    OUT:    Voided (mL): 460 mL  Total OUT: 460 mL    Total NET: 2990 mL          Lab Data                        7.6    7.80  )-----------( 180      ( 08 Nov 2021 06:26 )             25.5     11-07    140  |  102  |  81<H>  ----------------------------<  60<L>  3.3<L>   |  28  |  1.68<H>    Ca    8.3<L>      07 Nov 2021 11:37  Phos  4.1     11-07  Mg     3.1     11-07    TPro  6.7  /  Alb  2.1<L>  /  TBili  0.6  /  DBili  x   /  AST  141<H>  /  ALT  42  /  AlkPhos  87  11-07            Review of Systems	      Objective     Physical Examination    heart s1s2  lung dec BS  abd soft      Pertinent Lab findings & Imaging      Annalise:  NO   Adequate UO     I&O's Detail    06 Nov 2021 08:01  -  07 Nov 2021 07:00  --------------------------------------------------------  IN:    Enteral Tube Flush: 50 mL    Glucerna 1.5: 100 mL    IV PiggyBack: 1000 mL    sodium chloride 0.9%: 500 mL    Sodium Chloride 0.9% Bolus: 500 mL  Total IN: 2150 mL    OUT:  Total OUT: 0 mL    Total NET: 2150 mL      07 Nov 2021 07:01  -  08 Nov 2021 06:58  --------------------------------------------------------  IN:    dextrose 5% + sodium chloride 0.9%: 1280 mL    Enteral Tube Flush: 270 mL    Glucerna 1.5: 1050 mL    Oral Fluid: 350 mL    sodium chloride 0.9%: 500 mL  Total IN: 3450 mL    OUT:    Voided (mL): 460 mL  Total OUT: 460 mL    Total NET: 2990 mL               Discussed with:     Cultures:	        Radiology

## 2021-11-08 NOTE — PROGRESS NOTE ADULT - SUBJECTIVE AND OBJECTIVE BOX
Patient is a 75y Female whom presented to the hospital with manasa     PAST MEDICAL & SURGICAL HISTORY:  Dementia of frontal lobe type    Aphasic stroke    Diabetes mellitus    Respiratory failure    Hypertension    GERD (gastroesophageal reflux disease)    Constipation    Respiratory failure    CVA (cerebral vascular accident)    HTN (hypertension)    DM (diabetes mellitus)    Advanced dementia    COVID-19 virus detected    Quadriplegia    Pneumonia    Type II diabetes mellitus    Hx of appendectomy    Gastrostomy in place    Tracheostomy in place    Tracheostomy tube present    Feeding by G-tube        MEDICATIONS  (STANDING):  ALBUTerol    90 MICROgram(s) HFA Inhaler 2 Puff(s) Inhalation every 6 hours  albuterol/ipratropium for Nebulization 3 milliLiter(s) Nebulizer every 6 hours  aspirin  chewable 81 milliGRAM(s) Oral daily  chlorhexidine 0.12% Liquid 15 milliLiter(s) Oral Mucosa every 12 hours  dextrose 40% Gel 15 Gram(s) Oral once  dextrose 5% + sodium chloride 0.9%. 1000 milliLiter(s) (80 mL/Hr) IV Continuous <Continuous>  dextrose 5%. 1000 milliLiter(s) (50 mL/Hr) IV Continuous <Continuous>  dextrose 5%. 1000 milliLiter(s) (100 mL/Hr) IV Continuous <Continuous>  dextrose 50% Injectable 25 Gram(s) IV Push once  dextrose 50% Injectable 12.5 Gram(s) IV Push once  dextrose 50% Injectable 25 Gram(s) IV Push once  glucagon  Injectable 1 milliGRAM(s) IntraMuscular once  heparin   Injectable 5000 Unit(s) SubCutaneous every 8 hours  lactobacillus acidophilus 1 Tablet(s) Oral daily  methocarbamol 250 milliGRAM(s) Oral two times a day  pantoprazole   Suspension 40 milliGRAM(s) Oral daily  polyethylene glycol 3350 17 Gram(s) Oral every 12 hours  senna 2 Tablet(s) Oral at bedtime  simethicone 80 milliGRAM(s) Chew every 6 hours  sucralfate suspension 1 Gram(s) Oral four times a day      Allergies    codeine (Hives)    Intolerances        SOCIAL HISTORY:  Denies ETOh,Smoking,     FAMILY HISTORY:      REVIEW OF SYSTEMS:    CONSTITUTIONAL: No weakness, fevers or chills  RESPIRATORY: No cough, wheezing, hemoptysis; No shortness of breath  CARDIOVASCULAR: No chest pain or palpitations  GASTROINTESTINAL: No abdominal or epigastric pain. No nausea, vomiting,                              7.6    7.80  )-----------( 180      ( 2021 06:26 )             25.5       CBC Full  -  ( 2021 06:26 )  WBC Count : 7.80 K/uL  RBC Count : 2.88 M/uL  Hemoglobin : 7.6 g/dL  Hematocrit : 25.5 %  Platelet Count - Automated : 180 K/uL  Mean Cell Volume : 88.5 fl  Mean Cell Hemoglobin : 26.4 pg  Mean Cell Hemoglobin Concentration : 29.8 gm/dL  Auto Neutrophil # : 6.05 K/uL  Auto Lymphocyte # : 1.14 K/uL  Auto Monocyte # : 0.51 K/uL  Auto Eosinophil # : 0.07 K/uL  Auto Basophil # : 0.02 K/uL  Auto Neutrophil % : 77.6 %  Auto Lymphocyte % : 14.6 %  Auto Monocyte % : 6.5 %  Auto Eosinophil % : 0.9 %  Auto Basophil % : 0.3 %          146<H>  |  110<H>  |  56<H>  ----------------------------<  220<H>  3.8   |  26  |  1.35<H>    Ca    8.4      2021 16:38  Phos  2.9       Mg     3.1         TPro  6.7  /  Alb  2.2<L>  /  TBili  0.4  /  DBili  x   /  AST  83<H>  /  ALT  42  /  AlkPhos  96  08      CAPILLARY BLOOD GLUCOSE      POCT Blood Glucose.: 229 mg/dL (2021 15:13)  POCT Blood Glucose.: 233 mg/dL (2021 13:04)  POCT Blood Glucose.: 209 mg/dL (2021 05:35)  POCT Blood Glucose.: 214 mg/dL (2021 23:07)      Vital Signs Last 24 Hrs  T(C): 37.9 (2021 16:17), Max: 38.7 (2021 09:09)  T(F): 100.3 (2021 16:17), Max: 101.7 (2021 09:09)  HR: 76 (2021 17:50) (73 - 97)  BP: 102/57 (2021 16:00) (76/39 - 118/54)  BP(mean): 71 (2021 16:00) (50 - 77)  RR: 17 (2021 16:00) (11 - 22)  SpO2: 100% (2021 17:50) (97% - 100%)    Urinalysis Basic - ( 2021 23:11 )    Color: Yellow / Appearance: Turbid / S.020 / pH: x  Gluc: x / Ketone: Trace  / Bili: Negative / Urobili: 4 mg/dL   Blood: x / Protein: 100 mg/dL / Nitrite: Negative   Leuk Esterase: Moderate / RBC: 3-5 /HPF / WBC 11-25   Sq Epi: x / Non Sq Epi: Few / Bacteria: Occasional        PT/INR - ( 2021 06:26 )   PT: 14.1 sec;   INR: 1.17 ratio         PTT - ( 2021 23:11 )  PTT:42.3 sec      PHYSICAL EXAM:    Constitutional: NAD  HEENT: conjunctive   clear   Neck:  No JVD  Respiratory: pos for trach    Cardiovascular: S1 and S2  Gastrointestinal: BS+, soft, pos for peg   Extremities: No peripheral edema

## 2021-11-08 NOTE — PROGRESS NOTE ADULT - SUBJECTIVE AND OBJECTIVE BOX
BREA BECKHAM    [facility]    Allergies    codeine (Hives)    Intolerances        PAST MEDICAL & SURGICAL HISTORY:  Dementia of frontal lobe type    Aphasic stroke    Diabetes mellitus    Respiratory failure    Hypertension    GERD (gastroesophageal reflux disease)    Constipation    Respiratory failure    CVA (cerebral vascular accident)    HTN (hypertension)    DM (diabetes mellitus)    Advanced dementia    COVID-19 virus detected    Quadriplegia    Pneumonia    Type II diabetes mellitus    Hx of appendectomy    Gastrostomy in place    Tracheostomy in place    Tracheostomy tube present    Feeding by G-tube        FAMILY HISTORY:      Home Medications:  acetaminophen 160 mg/5 mL oral suspension: 20.31 milliliter(s) by gastrostomy tube every 6 hours, As Needed (:03)  albuterol 90 mcg/inh inhalation aerosol: 2 puff(s) inhaled every 6 hours (:)  aspirin 81 mg oral tablet, chewable: 1 tab(s) by PEG tube once a day (:03)  Bacid (LAC) oral tablet: 2 tab(s) by gastrostomy tube once a day (:03)  Carafate 1 g/10 mL oral suspension: 10 milliliter(s) by gastrostomy tube 4 times a day (before meals and at bedtime) for 14 days (Started 21) (:03)  chlorhexidine 0.12% mucous membrane liquid: 15 milliliter(s) mucous membrane 2 times a day (:03)  insulin glargine: 36 unit(s) subcutaneous once a day (at bedtime) (:03)  insulin lispro 100 units/mL injectable solution:  injectable; sliding scale coverage  (:03)  ipratropium-albuterol 0.5 mg-2.5 mg/3 mL inhalation solution: 3 milliliter(s) inhaled 4 times a day (:03)  LORazepam 1 mg oral tablet: 1 tab(s) by gastrostomy tube 3 times a day, As Needed (2021 01:48)  methocarbamol: 250 milligram(s) by gastrostomy tube 2 times a day (:03)  pantoprazole 40 mg oral granule, delayed release: 40 milligram(s) by gastrostomy tube 2 times a day (2021 00:03)  polyethylene glycol 3350 oral powder for reconstitution: 17 gram(s) orally every 12 hours (2021 00:03)  senna 8.6 mg oral tablet: 3 tab(s) by gastrostomy tube once a day (at bedtime) (2021 01:48)  simethicone 80 mg oral tablet, chewable: 1 tab(s) orally every 6 hours (2021 00:03)  Tylenol 325 mg oral tablet: 2 tab(s) by gastrostomy tube once a day; 60 minutes prior to dressing change  (2021 01:48)      MEDICATIONS  (STANDING):  ALBUTerol    90 MICROgram(s) HFA Inhaler 2 Puff(s) Inhalation every 6 hours  aspirin  chewable 81 milliGRAM(s) Oral daily  chlorhexidine 0.12% Liquid 15 milliLiter(s) Oral Mucosa every 12 hours  dextrose 40% Gel 15 Gram(s) Oral once  dextrose 5% + sodium chloride 0.9%. 1000 milliLiter(s) (80 mL/Hr) IV Continuous <Continuous>  dextrose 5%. 1000 milliLiter(s) (50 mL/Hr) IV Continuous <Continuous>  dextrose 5%. 1000 milliLiter(s) (100 mL/Hr) IV Continuous <Continuous>  dextrose 50% Injectable 25 Gram(s) IV Push once  dextrose 50% Injectable 12.5 Gram(s) IV Push once  dextrose 50% Injectable 25 Gram(s) IV Push once  glucagon  Injectable 1 milliGRAM(s) IntraMuscular once  heparin   Injectable 5000 Unit(s) SubCutaneous every 8 hours  lactobacillus acidophilus 1 Tablet(s) Oral daily  LORazepam     Tablet 1 milliGRAM(s) Oral three times a day  LORazepam   Injectable 2 milliGRAM(s) IV Push once  methocarbamol 250 milliGRAM(s) Oral two times a day  pantoprazole   Suspension 40 milliGRAM(s) Oral daily  polyethylene glycol 3350 17 Gram(s) Oral every 12 hours  senna 2 Tablet(s) Oral at bedtime  simethicone 80 milliGRAM(s) Chew every 6 hours  sucralfate suspension 1 Gram(s) Oral four times a day    MEDICATIONS  (PRN):  acetaminophen     Tablet .. 650 milliGRAM(s) Oral every 6 hours PRN Mild Pain (1 - 3)  acetaminophen    Suspension .. 650 milliGRAM(s) Oral every 6 hours PRN Temp greater or equal to 38C (100.4F)  LORazepam   Injectable 2 milliGRAM(s) IV Push every 2 hours PRN Aggitation / Vent Compliance / Tachypnea > 30      Diet, NPO:   Tube Feeding Modality: Gastrostomy  Glucerna 1.5 Horacio  Total Volume for 24 Hours (mL): 1200  Continuous  Starting Tube Feed Rate mL per Hour: 50  Until Goal Tube Feed Rate (mL per Hour): 50  Tube Feed Duration (in Hours): 24  Tube Feed Start Time: 16:00  Free Water Flush  Bolus   Total Volume per Flush (mL): 300   Frequency: Every 8 Hours (21 @ 01:59) [Active]          Vital Signs Last 24 Hrs  T(C): 38.7 (2021 09:09), Max: 38.7 (2021 09:09)  T(F): 101.7 (2021 09:09), Max: 101.7 (2021 09:09)  HR: 81 (:56) (65 - 776)  BP: 99/62 (2021 07:56) (76/39 - 141/65)  BP(mean): 72 (2021 07:56) (50 - 106)  RR: 13 (2021 07:56) (11 - 40)  SpO2: 100% (2021 07:56) (94% - 100%)      21 @ 07:01  -  21 @ 07:00  --------------------------------------------------------  IN: 3450 mL / OUT: 460 mL / NET: 2990 mL    21 @ 07:01  -  21 @ 10:30  --------------------------------------------------------  IN: 310 mL / OUT: 0 mL / NET: 310 mL        Mode: PRVC, RR (machine): 18, TV (machine): 400, FiO2: 40, PEEP: 5, ITime: 1, MAP: 12, PIP: 27      LABS:                        7.6    7.80  )-----------( 180      ( 2021 06:26 )             25.5         146<H>  |  107  |  67<H>  ----------------------------<  223<H>  3.6   |  30  |  1.54<H>    Ca    8.5      2021 06:26  Phos  2.9       Mg     3.1         TPro  6.7  /  Alb  2.2<L>  /  TBili  0.4  /  DBili  x   /  AST  83<H>  /  ALT  42  /  AlkPhos  96      PT/INR - ( 2021 06:26 )   PT: 14.1 sec;   INR: 1.17 ratio         PTT - ( 2021 23:11 )  PTT:42.3 sec  Urinalysis Basic - ( 2021 23:11 )    Color: Yellow / Appearance: Turbid / S.020 / pH: x  Gluc: x / Ketone: Trace  / Bili: Negative / Urobili: 4 mg/dL   Blood: x / Protein: 100 mg/dL / Nitrite: Negative   Leuk Esterase: Moderate / RBC: 3-5 /HPF / WBC 11-25   Sq Epi: x / Non Sq Epi: Few / Bacteria: Occasional            WBC:  WBC Count: 7.80 K/uL ( @ 06:26)  WBC Count: 10.59 K/uL ( @ 06:39)  WBC Count: 13.77 K/uL ( @ 23:11)      MICROBIOLOGY:  RECENT CULTURES:              PT/INR - ( 2021 06:26 )   PT: 14.1 sec;   INR: 1.17 ratio         PTT - ( 2021 23:11 )  PTT:42.3 sec    Sodium:  Sodium, Serum: 146 mmol/L ( @ 06:26)  Sodium, Serum: 140 mmol/L ( @ 11:37)  Sodium, Serum: 139 mmol/L ( @ 06:39)  Sodium, Serum: 139 mmol/L ( @ 23:11)      1.54 mg/dL  @ :26  1.68 mg/dL  11:37  1.85 mg/dL :39  2.45 mg/dL  @ 23:11      Hemoglobin:  Hemoglobin: 7.6 g/dL ( @ 06:26)  Hemoglobin: 8.8 g/dL ( @ 06:39)  Hemoglobin: 9.6 g/dL ( 23:11)      Platelets: Platelet Count - Automated: 180 K/uL ( @ :26)  Platelet Count - Automated: 202 K/uL ( @ 06:39)  Platelet Count - Automated: 238 K/uL ( @ 23:11)      LIVER FUNCTIONS - ( 2021 06:26 )  Alb: 2.2 g/dL / Pro: 6.7 g/dL / ALK PHOS: 96 U/L / ALT: 42 U/L DA / AST: 83 U/L / GGT: x             Urinalysis Basic - ( 2021 23:11 )    Color: Yellow / Appearance: Turbid / S.020 / pH: x  Gluc: x / Ketone: Trace  / Bili: Negative / Urobili: 4 mg/dL   Blood: x / Protein: 100 mg/dL / Nitrite: Negative   Leuk Esterase: Moderate / RBC: 3-5 /HPF / WBC 11-25   Sq Epi: x / Non Sq Epi: Few / Bacteria: Occasional        RADIOLOGY & ADDITIONAL STUDIES:      MICROBIOLOGY:  RECENT CULTURES:

## 2021-11-08 NOTE — PROGRESS NOTE ADULT - SUBJECTIVE AND OBJECTIVE BOX
Chief Complaint: Abnormal labs    Interval Events: No events overnight.    Review of Systems:  Unable to obtain    Physical Exam:  Vitals:        Vital Signs Last 24 Hrs  T(C): 38.7 (08 Nov 2021 09:09), Max: 38.7 (08 Nov 2021 09:09)  T(F): 101.7 (08 Nov 2021 09:09), Max: 101.7 (08 Nov 2021 09:09)  HR: 80 (08 Nov 2021 10:44) (65 - 776)  BP: 99/62 (08 Nov 2021 07:56) (76/39 - 141/65)  BP(mean): 72 (08 Nov 2021 07:56) (50 - 106)  RR: 13 (08 Nov 2021 07:56) (11 - 40)  SpO2: 100% (08 Nov 2021 10:44) (94% - 100%)  General: NAD  HEENT: Trach  Neck: No JVD, no carotid bruit  Lungs: CTAB  CV: RRR, nl S1/S2, no M/R/G  Abdomen: S/NT/ND, +BS  Extremities: No LE edema, no cyanosis  Neuro: AAOx0  Skin: No rash    Labs:                        7.6    7.80  )-----------( 180      ( 08 Nov 2021 06:26 )             25.5     11-08    146<H>  |  107  |  67<H>  ----------------------------<  223<H>  3.6   |  30  |  1.54<H>    Ca    8.5      08 Nov 2021 06:26  Phos  2.9     11-08  Mg     3.1     11-07    TPro  6.7  /  Alb  2.2<L>  /  TBili  0.4  /  DBili  x   /  AST  83<H>  /  ALT  42  /  AlkPhos  96  11-08        PT/INR - ( 08 Nov 2021 06:26 )   PT: 14.1 sec;   INR: 1.17 ratio         PTT - ( 06 Nov 2021 23:11 )  PTT:42.3 sec    Telemetry: Sinus rhythm

## 2021-11-08 NOTE — PROGRESS NOTE ADULT - SUBJECTIVE AND OBJECTIVE BOX
HISTORY  HPI:  ***Patient with dementia and is not responsive.  Collateral information obtained from chart and notes.***    75F with chronic resp failure - vent dependent, hx of subarachnoid hemorrhage, hx of cardiac arrest, dementia s/p PEG, HTN, DM2 on insulin, hx of CVA, GERD, COPD, admission here from 9/25 to 10/4 for respiratory failure/sepsis possibly 2/2 aspiration vs vent associated PNA who presents from [facility] for abnormal labs.  Unclear as to the reason why labs were done yesterday but patient was found to have an elevated BUN of 42 and then on repeat BUN of 100.  Was sent here for further evaluation.  In the ED, patient's initial triage vitals were BP 97/60, HR 72, RR 18, 100% on vent and T98.5F.  Labs showed leukocytosis of 13.7, BUN/Cr 104/2.45, lactate 3.3.  Patient given 1.8L of NS.  Patient's CT A/P showed no hydronephrosis but showed a calcific density within the right lung which may represent aspirated material (new from previous exam).  Started on vancomycin and zosyn empirically for PNA.  Patient's UA also showed many yeast and was started on fluconazole empirically as well.  Patient's repeat lactate trended down to 1.6 on repeat.  Patient RVP and COVID negative.        Calcific density within the right lung may represent aspirated material. This is new since the previous exam. (07 Nov 2021 01:30)      24 HOUR EVENTS:  Maintained on the Ventilator, Full support. Febrile throughout days. Remains on abx.     SUBJECTIVE/ROS:  [ ] A ten-point review of systems was otherwise negative except as noted.  [x ] Due to altered mental status/intubation, subjective information were not able to be obtained from the patient. History was obtained, to the extent possible, from review of the chart and collateral sources of information.      NEURO  RASS:  0  Exam: Nonverbal, unable to make needs known  Meds: acetaminophen     Tablet .. 650 milliGRAM(s) Oral every 6 hours PRN Mild Pain (1 - 3)  acetaminophen    Suspension .. 650 milliGRAM(s) Oral every 6 hours PRN Temp greater or equal to 38C (100.4F)  LORazepam     Tablet 1 milliGRAM(s) Oral three times a day  LORazepam   Injectable 2 milliGRAM(s) IV Push every 2 hours PRN Aggitation / Vent Compliance / Tachypnea > 30  LORazepam   Injectable 2 milliGRAM(s) IV Push once  methocarbamol 250 milliGRAM(s) Oral two times a day    [x] Adequacy of sedation and pain control has been assessed and adjusted      RESPIRATORY  RR: 17 (11-08-21 @ 16:00) (11 - 22)  SpO2: 100% (11-08-21 @ 17:50) (97% - 100%)  Wt(kg): --  Exam: coarse BS to auscultation bilaterally  Mechanical Ventilation: Mode: PRVC, RR (machine): 18, RR (patient): 18, TV (machine): 400, FiO2: 40, PEEP: 5, ITime: 1, MAP: 12, PIP: 23  ABG - ( 07 Nov 2021 01:45 )  pH: x     /  pCO2: x     /  pO2: x     / HCO3: x     / Base Excess: x     /  SaO2: x       Lactate: 1.6        [NA ] Extubation Readiness Assessed  Meds: ALBUTerol    90 MICROgram(s) HFA Inhaler 2 Puff(s) Inhalation every 6 hours    CARDIOVASCULAR  HR: 76 (11-08-21 @ 17:50) (73 - 97)  BP: 102/57 (11-08-21 @ 16:00) (76/39 - 118/54)  BP(mean): 71 (11-08-21 @ 16:00) (50 - 77)  ABP: --  ABP(mean): --  Wt(kg): --  CVP(cm H2O): --      Exam:  Cardiac Rhythm: S1S2 RRR  Perfusion     [x ]Adequate   [ ]Inadequate  Mentation   [ ]Normal       [x ]Reduced  Extremities  x[ ]Warm         [ ]Cool  Volume Status [ ]Hypervolemic [x ]Euvolemic [ ]Hypovolemic  Meds:       GI/NUTRITION  Exam: Soft NT ND, PEG in place  Diet: Glucerna 50cc/hr  Meds: pantoprazole   Suspension 40 milliGRAM(s) Oral daily  polyethylene glycol 3350 17 Gram(s) Oral every 12 hours  senna 2 Tablet(s) Oral at bedtime  simethicone 80 milliGRAM(s) Chew every 6 hours  sucralfate suspension 1 Gram(s) Oral four times a day      GENITOURINARY  I&O's Detail    11-07 @ 07:01  -  11-08 @ 07:00  --------------------------------------------------------  IN:    dextrose 5% + sodium chloride 0.9%: 1280 mL    Enteral Tube Flush: 270 mL    Glucerna 1.5: 1050 mL    Oral Fluid: 350 mL    sodium chloride 0.9%: 500 mL  Total IN: 3450 mL    OUT:    Voided (mL): 460 mL  Total OUT: 460 mL    Total NET: 2990 mL      11-08 @ 07:01  - 11-08 @ 19:34  --------------------------------------------------------  IN:    dextrose 5% + sodium chloride 0.45%: 160 mL    dextrose 5% + sodium chloride 0.9%: 160 mL    Enteral Tube Flush: 407 mL    Glucerna 1.5: 550 mL    sodium chloride 0.45%: 240 mL  Total IN: 1517 mL    OUT:  Total OUT: 0 mL    Total NET: 1517 mL          11-08    146<H>  |  110<H>  |  56<H>  ----------------------------<  220<H>  3.8   |  26  |  1.35<H>    Ca    8.4      08 Nov 2021 16:38  Phos  2.9     11-08  Mg     3.1     11-07    TPro  6.7  /  Alb  2.2<L>  /  TBili  0.4  /  DBili  x   /  AST  83<H>  /  ALT  42  /  AlkPhos  96  11-08    [ ] Woody catheter, indication: N/A  Meds: dextrose 5%. 1000 milliLiter(s) IV Continuous <Continuous>  dextrose 5%. 1000 milliLiter(s) IV Continuous <Continuous>  sodium chloride 0.45%. 1000 milliLiter(s) IV Continuous <Continuous>        HEMATOLOGIC  Meds: aspirin  chewable 81 milliGRAM(s) Oral daily  heparin   Injectable 5000 Unit(s) SubCutaneous every 8 hours    [x] VTE Prophylaxis                        7.6    7.80  )-----------( 180      ( 08 Nov 2021 06:26 )             25.5     PT/INR - ( 08 Nov 2021 06:26 )   PT: 14.1 sec;   INR: 1.17 ratio         PTT - ( 06 Nov 2021 23:11 )  PTT:42.3 sec  Transfusion     [ ] PRBC   [ ] Platelets   [ ] FFP   [ ] Cryoprecipitate      INFECTIOUS DISEASES  T(C): 37.9 (11-08-21 @ 16:17), Max: 38.7 (11-08-21 @ 09:09)  Wt(kg): --  WBC Count: 7.80 K/uL (11-08 @ 06:26)    Recent Cultures:  Specimen Source: .Blood Blood-Peripheral, 11-07 @ 12:02; Results   No growth to date.; Gram Stain: --; Organism: --    Meds:       ENDOCRINE  CAPILLARY BLOOD GLUCOSE      POCT Blood Glucose.: 229 mg/dL (08 Nov 2021 15:13)  POCT Blood Glucose.: 233 mg/dL (08 Nov 2021 13:04)  POCT Blood Glucose.: 209 mg/dL (08 Nov 2021 05:35)  POCT Blood Glucose.: 214 mg/dL (07 Nov 2021 23:07)      Meds: dextrose 40% Gel 15 Gram(s) Oral once  dextrose 50% Injectable 25 Gram(s) IV Push once  dextrose 50% Injectable 12.5 Gram(s) IV Push once  dextrose 50% Injectable 25 Gram(s) IV Push once  glucagon  Injectable 1 milliGRAM(s) IntraMuscular once        ACCESS DEVICES:  [x ] Peripheral IV  [ ] Central Venous Line	[ ] R	[ ] L	[ ] IJ	[ ] Fem	[ ] SC	Placed:   [ ] Arterial Line		[ ] R	[ ] L	[ ] Fem	[ ] Rad	[ ] Ax	Placed:   [ ] PICC:					[ ] Mediport  [ ] Urinary Catheter, Date Placed:   [ ] Necessity of urinary, arterial, and venous catheters discussed    OTHER MEDICATIONS:  chlorhexidine 0.12% Liquid 15 milliLiter(s) Oral Mucosa every 12 hours  lactobacillus acidophilus 1 Tablet(s) Oral daily      CODE STATUS: Full    IMAGING:  CT Chest:   IMPRESSION:  Bilateral effusions and bibasilar atelectasis not significantly changed. Calcific density within the right lower lobe is new since the previous chest CT, and may represent aspirated material. Developing aspiration pneumonia cannot be excluded. Correlate clinically.  Previously seen bilateral patchy airspace opacities in a perihilar distribution have almost completely resolved since the previous chest CT.     HISTORY  HPI:  ***Patient with dementia and is not responsive.  Collateral information obtained from chart and notes.***    75F with chronic resp failure - vent dependent, hx of subarachnoid hemorrhage, hx of cardiac arrest, dementia s/p PEG, HTN, DM2 on insulin, hx of CVA, GERD, COPD, admission here from 9/25 to 10/4 for respiratory failure/sepsis possibly 2/2 aspiration vs vent associated PNA who presents [facility] for abnormal labs.  Unclear as to the reason why labs were done yesterday but patient was found to have an elevated BUN of 42 and then on repeat BUN of 100.  Was sent here for further evaluation.  In the ED, patient's initial triage vitals were BP 97/60, HR 72, RR 18, 100% on vent and T98.5F.  Labs showed leukocytosis of 13.7, BUN/Cr 104/2.45, lactate 3.3.  Patient given 1.8L of NS.  Patient's CT A/P showed no hydronephrosis but showed a calcific density within the right lung which may represent aspirated material (new from previous exam).  Started on vancomycin and zosyn empirically for PNA.  Patient's UA also showed many yeast and was started on fluconazole empirically as well.  Patient's repeat lactate trended down to 1.6 on repeat.  Patient RVP and COVID negative.        Calcific density within the right lung may represent aspirated material. This is new since the previous exam. (07 Nov 2021 01:30)      24 HOUR EVENTS:  Maintained on the Ventilator, Full support. Febrile throughout days. Remains off abx. Cultures pending    SUBJECTIVE/ROS:  [ ] A ten-point review of systems was otherwise negative except as noted.  [x ] Due to altered mental status/intubation, subjective information were not able to be obtained from the patient. History was obtained, to the extent possible, from review of the chart and collateral sources of information.      NEURO  RASS:  0  Exam: Nonverbal, unable to make needs known  Meds: acetaminophen     Tablet .. 650 milliGRAM(s) Oral every 6 hours PRN Mild Pain (1 - 3)  acetaminophen    Suspension .. 650 milliGRAM(s) Oral every 6 hours PRN Temp greater or equal to 38C (100.4F)  LORazepam     Tablet 1 milliGRAM(s) Oral three times a day  LORazepam   Injectable 2 milliGRAM(s) IV Push every 2 hours PRN Aggitation / Vent Compliance / Tachypnea > 30  LORazepam   Injectable 2 milliGRAM(s) IV Push once  methocarbamol 250 milliGRAM(s) Oral two times a day    [x] Adequacy of sedation and pain control has been assessed and adjusted      RESPIRATORY  RR: 17 (11-08-21 @ 16:00) (11 - 22)  SpO2: 100% (11-08-21 @ 17:50) (97% - 100%)  Wt(kg): --  Exam: coarse BS to auscultation bilaterally  Mechanical Ventilation: Mode: PRVC, RR (machine): 18, RR (patient): 18, TV (machine): 400, FiO2: 40, PEEP: 5, ITime: 1, MAP: 12, PIP: 23  ABG - ( 07 Nov 2021 01:45 )  pH: x     /  pCO2: x     /  pO2: x     / HCO3: x     / Base Excess: x     /  SaO2: x       Lactate: 1.6        [NA ] Extubation Readiness Assessed  Meds: ALBUTerol    90 MICROgram(s) HFA Inhaler 2 Puff(s) Inhalation every 6 hours    CARDIOVASCULAR  HR: 76 (11-08-21 @ 17:50) (73 - 97)  BP: 102/57 (11-08-21 @ 16:00) (76/39 - 118/54)  BP(mean): 71 (11-08-21 @ 16:00) (50 - 77)  ABP: --  ABP(mean): --  Wt(kg): --  CVP(cm H2O): --      Exam:  Cardiac Rhythm: S1S2 RRR  Perfusion     [x ]Adequate   [ ]Inadequate  Mentation   [ ]Normal       [x ]Reduced  Extremities  x[ ]Warm         [ ]Cool  Volume Status [ ]Hypervolemic [x ]Euvolemic [ ]Hypovolemic  Meds:       GI/NUTRITION  Exam: Soft NT ND, PEG in place  Diet: Glucerna 50cc/hr  Meds: pantoprazole   Suspension 40 milliGRAM(s) Oral daily  polyethylene glycol 3350 17 Gram(s) Oral every 12 hours  senna 2 Tablet(s) Oral at bedtime  simethicone 80 milliGRAM(s) Chew every 6 hours  sucralfate suspension 1 Gram(s) Oral four times a day      GENITOURINARY  I&O's Detail    11-07 @ 07:01  -  11-08 @ 07:00  --------------------------------------------------------  IN:    dextrose 5% + sodium chloride 0.9%: 1280 mL    Enteral Tube Flush: 270 mL    Glucerna 1.5: 1050 mL    Oral Fluid: 350 mL    sodium chloride 0.9%: 500 mL  Total IN: 3450 mL    OUT:    Voided (mL): 460 mL  Total OUT: 460 mL    Total NET: 2990 mL      11-08 @ 07:01  -  11-08 @ 19:34  --------------------------------------------------------  IN:    dextrose 5% + sodium chloride 0.45%: 160 mL    dextrose 5% + sodium chloride 0.9%: 160 mL    Enteral Tube Flush: 407 mL    Glucerna 1.5: 550 mL    sodium chloride 0.45%: 240 mL  Total IN: 1517 mL    OUT:  Total OUT: 0 mL    Total NET: 1517 mL          11-08    146<H>  |  110<H>  |  56<H>  ----------------------------<  220<H>  3.8   |  26  |  1.35<H>    Ca    8.4      08 Nov 2021 16:38  Phos  2.9     11-08  Mg     3.1     11-07    TPro  6.7  /  Alb  2.2<L>  /  TBili  0.4  /  DBili  x   /  AST  83<H>  /  ALT  42  /  AlkPhos  96  11-08    [ ] Woody catheter, indication: N/A  Meds: dextrose 5%. 1000 milliLiter(s) IV Continuous <Continuous>  dextrose 5%. 1000 milliLiter(s) IV Continuous <Continuous>  sodium chloride 0.45%. 1000 milliLiter(s) IV Continuous <Continuous>        HEMATOLOGIC  Meds: aspirin  chewable 81 milliGRAM(s) Oral daily  heparin   Injectable 5000 Unit(s) SubCutaneous every 8 hours    [x] VTE Prophylaxis                        7.6    7.80  )-----------( 180      ( 08 Nov 2021 06:26 )             25.5     PT/INR - ( 08 Nov 2021 06:26 )   PT: 14.1 sec;   INR: 1.17 ratio         PTT - ( 06 Nov 2021 23:11 )  PTT:42.3 sec  Transfusion     [ ] PRBC   [ ] Platelets   [ ] FFP   [ ] Cryoprecipitate      INFECTIOUS DISEASES  T(C): 37.9 (11-08-21 @ 16:17), Max: 38.7 (11-08-21 @ 09:09)  Wt(kg): --  WBC Count: 7.80 K/uL (11-08 @ 06:26)    Recent Cultures:  Specimen Source: .Blood Blood-Peripheral, 11-07 @ 12:02; Results   No growth to date.; Gram Stain: --; Organism: --    Meds:       ENDOCRINE  CAPILLARY BLOOD GLUCOSE      POCT Blood Glucose.: 229 mg/dL (08 Nov 2021 15:13)  POCT Blood Glucose.: 233 mg/dL (08 Nov 2021 13:04)  POCT Blood Glucose.: 209 mg/dL (08 Nov 2021 05:35)  POCT Blood Glucose.: 214 mg/dL (07 Nov 2021 23:07)      Meds: dextrose 40% Gel 15 Gram(s) Oral once  dextrose 50% Injectable 25 Gram(s) IV Push once  dextrose 50% Injectable 12.5 Gram(s) IV Push once  dextrose 50% Injectable 25 Gram(s) IV Push once  glucagon  Injectable 1 milliGRAM(s) IntraMuscular once        ACCESS DEVICES:  [x ] Peripheral IV  [ ] Central Venous Line	[ ] R	[ ] L	[ ] IJ	[ ] Fem	[ ] SC	Placed:   [ ] Arterial Line		[ ] R	[ ] L	[ ] Fem	[ ] Rad	[ ] Ax	Placed:   [ ] PICC:					[ ] Mediport  [ ] Urinary Catheter, Date Placed:   [ ] Necessity of urinary, arterial, and venous catheters discussed    OTHER MEDICATIONS:  chlorhexidine 0.12% Liquid 15 milliLiter(s) Oral Mucosa every 12 hours  lactobacillus acidophilus 1 Tablet(s) Oral daily      CODE STATUS: Full    IMAGING:  CT Chest:   IMPRESSION:  Bilateral effusions and bibasilar atelectasis not significantly changed. Calcific density within the right lower lobe is new since the previous chest CT, and may represent aspirated material. Developing aspiration pneumonia cannot be excluded. Correlate clinically.  Previously seen bilateral patchy airspace opacities in a perihilar distribution have almost completely resolved since the previous chest CT.

## 2021-11-08 NOTE — PROGRESS NOTE ADULT - SUBJECTIVE AND OBJECTIVE BOX
[facility] DIVISION of INFECTIOUS DISEASE  Arturo Olivas MD PhD, Haylee Umanzor MD, Andressa Brantley MD, Billy Valenzuela MD, Emil Medellin MD  and providing coverage with Alexa Canas MD and Eusebio Chairez MD  Providing Infectious Disease Consultations at [facility], [facility]    Office# [phone] to schedule follow up appointments  Answering Service for urgent calls or New Consults [phone]  Cell# to text for urgent issues Arturo Oliavs [phone]     infectious diseases progress note:    BREA BECKHAM is a 75y y. o. Female patient    No concerning overnight events    Allergies    codeine (Hives)    Intolerances        ANTIBIOTICS/RELEVANT:  antimicrobials    immunologic:    OTHER:  acetaminophen     Tablet .. 650 milliGRAM(s) Oral every 6 hours PRN  acetaminophen    Suspension .. 650 milliGRAM(s) Oral every 6 hours PRN  ALBUTerol    90 MICROgram(s) HFA Inhaler 2 Puff(s) Inhalation every 6 hours  aspirin  chewable 81 milliGRAM(s) Oral daily  chlorhexidine 0.12% Liquid 15 milliLiter(s) Oral Mucosa every 12 hours  dextrose 40% Gel 15 Gram(s) Oral once  dextrose 5% + sodium chloride 0.9%. 1000 milliLiter(s) IV Continuous <Continuous>  dextrose 5%. 1000 milliLiter(s) IV Continuous <Continuous>  dextrose 5%. 1000 milliLiter(s) IV Continuous <Continuous>  dextrose 50% Injectable 25 Gram(s) IV Push once  dextrose 50% Injectable 12.5 Gram(s) IV Push once  dextrose 50% Injectable 25 Gram(s) IV Push once  glucagon  Injectable 1 milliGRAM(s) IntraMuscular once  heparin   Injectable 5000 Unit(s) SubCutaneous every 8 hours  lactobacillus acidophilus 1 Tablet(s) Oral daily  LORazepam     Tablet 1 milliGRAM(s) Oral three times a day  LORazepam   Injectable 2 milliGRAM(s) IV Push every 2 hours PRN  LORazepam   Injectable 2 milliGRAM(s) IV Push once  methocarbamol 250 milliGRAM(s) Oral two times a day  pantoprazole   Suspension 40 milliGRAM(s) Oral daily  polyethylene glycol 3350 17 Gram(s) Oral every 12 hours  senna 2 Tablet(s) Oral at bedtime  simethicone 80 milliGRAM(s) Chew every 6 hours  sucralfate suspension 1 Gram(s) Oral four times a day      Objective:  Vital Signs Last 24 Hrs  T(C): 37.5 (2021 08:05), Max: 37.7 (2021 21:18)  T(F): 99.5 (2021 08:05), Max: 99.8 (2021 21:18)  HR: 81 (2021 07:56) (63 - 776)  BP: 99/62 (2021 07:56) (76/39 - 141/65)  BP(mean): 72 (2021 07:56) (50 - 106)  RR: 13 (:56) (11 - 40)  SpO2: 100% (:56) (94% - 100%)    T(C): 37.5 (21 @ 08:05), Max: 37.7 (21 @ 21:18)  T(C): 37.5 (21 @ 08:05), Max: 37.7 (21 @ 21:18)  T(C): 37.5 (21 @ 08:05), Max: 37.7 (21 @ 21:18)    PHYSICAL EXAM:  HEENT: NC atraumatic  Neck: supple, INTUBATED via trach  Respiratory: no accessory muscle use, breathing comfortably  Cardiovascular: distant  Gastrointestinal: normal appearing, nondistended  Extremities: no clubbing, no cyanosis,    Mode: PRVC, RR (machine): 18, TV (machine): 400, FiO2: 40, PEEP: 5, ITime: 1, MAP: 12, PIP: 27    LABS:                          7.6    7.80  )-----------( 180      ( 2021 06:26 )             25.5       7.80  @ 06:26  10.59  @ 06:39  13.77  @ 23:11          146<H>  |  107  |  67<H>  ----------------------------<  223<H>  3.6   |  30  |  1.54<H>    Ca    8.5      2021 06:26  Phos  2.9       Mg     3.1         TPro  6.7  /  Alb  2.2<L>  /  TBili  0.4  /  DBili  x   /  AST  83<H>  /  ALT  42  /  AlkPhos  96        Creatinine, Serum: 1.54 mg/dL (21 @ 06:26)  Creatinine, Serum: 1.68 mg/dL (21 @ 11:37)  Creatinine, Serum: 1.85 mg/dL (21 @ 06:39)  Creatinine, Serum: 2.45 mg/dL (21 @ 23:11)      PT/INR - ( 2021 06:26 )   PT: 14.1 sec;   INR: 1.17 ratio         PTT - ( 2021 23:11 )  PTT:42.3 sec  Urinalysis Basic - ( 2021 23:11 )    Color: Yellow / Appearance: Turbid / S.020 / pH: x  Gluc: x / Ketone: Trace  / Bili: Negative / Urobili: 4 mg/dL   Blood: x / Protein: 100 mg/dL / Nitrite: Negative   Leuk Esterase: Moderate / RBC: 3-5 /HPF / WBC 11-25   Sq Epi: x / Non Sq Epi: Few / Bacteria: Occasional            INFLAMMATORY MARKERS  Auto Neutrophil #: 6.05 K/uL (21 @ 06:26)  Auto Lymphocyte #: 1.14 K/uL (21 @ 06:26)  Auto Lymphocyte #: 2.16 K/uL (21 @ 06:39)  Auto Neutrophil #: 7.77 K/uL (21 @ 06:39)  Auto Lymphocyte #: 2.19 K/uL (21 @ 23:11)  Auto Neutrophil #: 10.60 K/uL (21 @ 23:11)    Lactate, Blood: 1.6 mmol/L (21 @ 01:45)  Lactate, Blood: 3.3 mmol/L (21 @ 23:11)    Auto Eosinophil #: 0.07 K/uL (21 @ 06:26)  Auto Eosinophil #: 0.09 K/uL (21 @ 06:39)  Auto Eosinophil #: 0.09 K/uL (21 @ 23:11)                        INR: 1.17 ratio (21 @ 06:26)  Activated Partial Thromboplastin Time: 42.3 sec (21 @ 23:11)  INR: 1.16 ratio (21 @ 23:11)          MICROBIOLOGY:              RADIOLOGY & ADDITIONAL STUDIES:

## 2021-11-08 NOTE — PROGRESS NOTE ADULT - SUBJECTIVE AND OBJECTIVE BOX
Patient is a 75y old  Female who presents with a chief complaint of abnormal labs (2021 10:30)      INTERVAL HPI/OVERNIGHT EVENTS:    No overnight events.      MEDICATIONS  (STANDING):  ALBUTerol    90 MICROgram(s) HFA Inhaler 2 Puff(s) Inhalation every 6 hours  aspirin  chewable 81 milliGRAM(s) Oral daily  chlorhexidine 0.12% Liquid 15 milliLiter(s) Oral Mucosa every 12 hours  dextrose 40% Gel 15 Gram(s) Oral once  dextrose 5% + sodium chloride 0.9%. 1000 milliLiter(s) (80 mL/Hr) IV Continuous <Continuous>  dextrose 5%. 1000 milliLiter(s) (50 mL/Hr) IV Continuous <Continuous>  dextrose 5%. 1000 milliLiter(s) (100 mL/Hr) IV Continuous <Continuous>  dextrose 50% Injectable 25 Gram(s) IV Push once  dextrose 50% Injectable 12.5 Gram(s) IV Push once  dextrose 50% Injectable 25 Gram(s) IV Push once  glucagon  Injectable 1 milliGRAM(s) IntraMuscular once  heparin   Injectable 5000 Unit(s) SubCutaneous every 8 hours  lactobacillus acidophilus 1 Tablet(s) Oral daily  LORazepam     Tablet 1 milliGRAM(s) Oral three times a day  LORazepam   Injectable 2 milliGRAM(s) IV Push once  methocarbamol 250 milliGRAM(s) Oral two times a day  pantoprazole   Suspension 40 milliGRAM(s) Oral daily  polyethylene glycol 3350 17 Gram(s) Oral every 12 hours  senna 2 Tablet(s) Oral at bedtime  simethicone 80 milliGRAM(s) Chew every 6 hours  sucralfate suspension 1 Gram(s) Oral four times a day    MEDICATIONS  (PRN):  acetaminophen     Tablet .. 650 milliGRAM(s) Oral every 6 hours PRN Mild Pain (1 - 3)  acetaminophen    Suspension .. 650 milliGRAM(s) Oral every 6 hours PRN Temp greater or equal to 38C (100.4F)  LORazepam   Injectable 2 milliGRAM(s) IV Push every 2 hours PRN Aggitation / Vent Compliance / Tachypnea > 30      Allergies    codeine (Hives)    Intolerances        Vital Signs Last 24 Hrs  T(C): 38.7 (2021 09:09), Max: 38.7 (2021 09:09)  T(F): 101.7 (2021 09:09), Max: 101.7 (2021 09:09)  HR: 80 (2021 10:44) (65 - 776)  BP: 99/62 (2021 07:56) (76/39 - 141/65)  BP(mean): 72 (2021 07:56) (50 - 106)  RR: 13 (2021 07:56) (11 - 40)  SpO2: 100% (2021 10:44) (94% - 100%)    PHYSICAL EXAM:  GENERAL: NAD, well-groomed, well-developed  HEAD:  Atraumatic, Normocephalic  EYES: EOMI, PERRLA, conjunctiva and sclera clear  ENMT: Moist mucous membranes, No lesions; No tonsillar erythema, exudates, or enlargement  NECK: Supple, No JVD, Normal thyroid  NERVOUS SYSTEM:  Alert & Oriented X3, Good concentration; All 4 extremities mobile, no gross sensory deficits.   CHEST/LUNG: Clear to auscultation bilaterally; No rales, rhonchi, wheezing, or rubs  HEART: Regular rate and rhythm; No murmurs, rubs, or gallops  ABDOMEN: Soft, Nontender, Nondistended; Bowel sounds present  EXTREMITIES:  2+ Peripheral Pulses, No clubbing, cyanosis, or edema  LYMPH: No lymphadenopathy noted  SKIN: No rashes or lesions    LABS:                        7.6    7.80  )-----------( 180      ( 2021 06:26 )             25.5     2021 06:26    146    |  107    |  67     ----------------------------<  223    3.6     |  30     |  1.54     Ca    8.5        2021 06:26  Phos  2.9       2021 06:26    TPro  6.7    /  Alb  2.2    /  TBili  0.4    /  DBili  x      /  AST  83     /  ALT  42     /  AlkPhos  96     2021 06:26    PT/INR - ( 2021 06:26 )   PT: 14.1 sec;   INR: 1.17 ratio         PTT - ( 2021 23:11 )  PTT:42.3 sec  Urinalysis Basic - ( 2021 23:11 )    Color: Yellow / Appearance: Turbid / S.020 / pH: x  Gluc: x / Ketone: Trace  / Bili: Negative / Urobili: 4 mg/dL   Blood: x / Protein: 100 mg/dL / Nitrite: Negative   Leuk Esterase: Moderate / RBC: 3-5 /HPF / WBC 11-25   Sq Epi: x / Non Sq Epi: Few / Bacteria: Occasional      CAPILLARY BLOOD GLUCOSE      POCT Blood Glucose.: 209 mg/dL (2021 05:35)  POCT Blood Glucose.: 214 mg/dL (2021 23:07)  POCT Blood Glucose.: 214 mg/dL (2021 18:04)  POCT Blood Glucose.: 164 mg/dL (2021 13:25)  POCT Blood Glucose.: 215 mg/dL (2021 12:29)  POCT Blood Glucose.: 56 mg/dL (2021 12:01)  POCT Blood Glucose.: 52 mg/dL (2021 11:59)      RADIOLOGY & ADDITIONAL TESTS:     Patient is a 75y old  Female who presents with a chief complaint of abnormal labs (2021 10:30)      INTERVAL HPI/OVERNIGHT EVENTS:    No overnight events.      MEDICATIONS  (STANDING):  ALBUTerol    90 MICROgram(s) HFA Inhaler 2 Puff(s) Inhalation every 6 hours  aspirin  chewable 81 milliGRAM(s) Oral daily  chlorhexidine 0.12% Liquid 15 milliLiter(s) Oral Mucosa every 12 hours  dextrose 40% Gel 15 Gram(s) Oral once  dextrose 5% + sodium chloride 0.9%. 1000 milliLiter(s) (80 mL/Hr) IV Continuous <Continuous>  dextrose 5%. 1000 milliLiter(s) (50 mL/Hr) IV Continuous <Continuous>  dextrose 5%. 1000 milliLiter(s) (100 mL/Hr) IV Continuous <Continuous>  dextrose 50% Injectable 25 Gram(s) IV Push once  dextrose 50% Injectable 12.5 Gram(s) IV Push once  dextrose 50% Injectable 25 Gram(s) IV Push once  glucagon  Injectable 1 milliGRAM(s) IntraMuscular once  heparin   Injectable 5000 Unit(s) SubCutaneous every 8 hours  lactobacillus acidophilus 1 Tablet(s) Oral daily  LORazepam     Tablet 1 milliGRAM(s) Oral three times a day  LORazepam   Injectable 2 milliGRAM(s) IV Push once  methocarbamol 250 milliGRAM(s) Oral two times a day  pantoprazole   Suspension 40 milliGRAM(s) Oral daily  polyethylene glycol 3350 17 Gram(s) Oral every 12 hours  senna 2 Tablet(s) Oral at bedtime  simethicone 80 milliGRAM(s) Chew every 6 hours  sucralfate suspension 1 Gram(s) Oral four times a day    MEDICATIONS  (PRN):  acetaminophen     Tablet .. 650 milliGRAM(s) Oral every 6 hours PRN Mild Pain (1 - 3)  acetaminophen    Suspension .. 650 milliGRAM(s) Oral every 6 hours PRN Temp greater or equal to 38C (100.4F)  LORazepam   Injectable 2 milliGRAM(s) IV Push every 2 hours PRN Aggitation / Vent Compliance / Tachypnea > 30      Allergies    codeine (Hives)    Intolerances        Vital Signs Last 24 Hrs  T(C): 38.7 (2021 09:09), Max: 38.7 (2021 09:09)  T(F): 101.7 (2021 09:09), Max: 101.7 (2021 09:09)  HR: 80 (2021 10:44) (65 - 776)  BP: 99/62 (2021 07:56) (76/39 - 141/65)  BP(mean): 72 (2021 07:56) (50 - 106)  RR: 13 (2021 07:56) (11 - 40)  SpO2: 100% (2021 10:44) (94% - 100%)    PHYSICAL EXAM:  GENERAL:     chronically ill appearing, emaciated, toxic appearing female with +trach +PEG  HEAD:     atraumatic  EYES:    conjunctiva clear  NECK:     +trach in place, clean  RESPIRATORY:     +vented sounds (coarse sounds), no gross crackles or rales  CARDIOVASCULAR:     regular rate and rhythm, no murmurs or rubs or gallops, 2+ peripheral pulses  GASTROINTESTINAL:     soft, +PEG in place, clean and dry as well, nondistended, no hepatosplenomegaly palpated, bowel sounds present  EXTREMITIES:     no clubbing or cyanosis or edema  MUSCULOSKELETAL:     +diffuse contractures in all joints  NERVOUS SYSTEM:     motor strength intact with 5/5 B/L upper and lower extremities, no gross sensory deficits  SKIN:     necrotic tips of bilateral 1st toes  PSYCH:     non-responsive, barely responds to any stimuli    LABS:                        7.6    7.80  )-----------( 180      ( 2021 06:26 )             25.5     2021 06:26    146    |  107    |  67     ----------------------------<  223    3.6     |  30     |  1.54     Ca    8.5        2021 06:26  Phos  2.9       2021 06:26    TPro  6.7    /  Alb  2.2    /  TBili  0.4    /  DBili  x      /  AST  83     /  ALT  42     /  AlkPhos  96     2021 06:26    PT/INR - ( 2021 06:26 )   PT: 14.1 sec;   INR: 1.17 ratio         PTT - ( 2021 23:11 )  PTT:42.3 sec  Urinalysis Basic - ( 2021 23:11 )    Color: Yellow / Appearance: Turbid / S.020 / pH: x  Gluc: x / Ketone: Trace  / Bili: Negative / Urobili: 4 mg/dL   Blood: x / Protein: 100 mg/dL / Nitrite: Negative   Leuk Esterase: Moderate / RBC: 3-5 /HPF / WBC 11-25   Sq Epi: x / Non Sq Epi: Few / Bacteria: Occasional      CAPILLARY BLOOD GLUCOSE      POCT Blood Glucose.: 209 mg/dL (2021 05:35)  POCT Blood Glucose.: 214 mg/dL (2021 23:07)  POCT Blood Glucose.: 214 mg/dL (2021 18:04)  POCT Blood Glucose.: 164 mg/dL (2021 13:25)  POCT Blood Glucose.: 215 mg/dL (2021 12:29)  POCT Blood Glucose.: 56 mg/dL (2021 12:01)  POCT Blood Glucose.: 52 mg/dL (2021 11:59)      RADIOLOGY & ADDITIONAL TESTS:

## 2021-11-09 DIAGNOSIS — E11.65 TYPE 2 DIABETES MELLITUS WITH HYPERGLYCEMIA: ICD-10-CM

## 2021-11-09 LAB
ALBUMIN SERPL ELPH-MCNC: 2.2 G/DL — LOW (ref 3.3–5)
ALP SERPL-CCNC: 102 U/L — SIGNIFICANT CHANGE UP (ref 30–120)
ALT FLD-CCNC: 40 U/L DA — SIGNIFICANT CHANGE UP (ref 10–60)
ANION GAP SERPL CALC-SCNC: 10 MMOL/L — SIGNIFICANT CHANGE UP (ref 5–17)
AST SERPL-CCNC: 50 U/L — HIGH (ref 10–40)
BILIRUB SERPL-MCNC: 0.6 MG/DL — SIGNIFICANT CHANGE UP (ref 0.2–1.2)
BUN SERPL-MCNC: 46 MG/DL — HIGH (ref 7–23)
CALCIUM SERPL-MCNC: 8.3 MG/DL — LOW (ref 8.4–10.5)
CHLORIDE SERPL-SCNC: 104 MMOL/L — SIGNIFICANT CHANGE UP (ref 96–108)
CO2 SERPL-SCNC: 26 MMOL/L — SIGNIFICANT CHANGE UP (ref 22–31)
CREAT SERPL-MCNC: 1.36 MG/DL — HIGH (ref 0.5–1.3)
GLUCOSE SERPL-MCNC: 193 MG/DL — HIGH (ref 70–99)
HCT VFR BLD CALC: 26.3 % — LOW (ref 34.5–45)
HGB BLD-MCNC: 7.8 G/DL — LOW (ref 11.5–15.5)
MCHC RBC-ENTMCNC: 26.7 PG — LOW (ref 27–34)
MCHC RBC-ENTMCNC: 29.7 GM/DL — LOW (ref 32–36)
MCV RBC AUTO: 90.1 FL — SIGNIFICANT CHANGE UP (ref 80–100)
NRBC # BLD: 0 /100 WBCS — SIGNIFICANT CHANGE UP (ref 0–0)
PLATELET # BLD AUTO: 228 K/UL — SIGNIFICANT CHANGE UP (ref 150–400)
POTASSIUM SERPL-MCNC: 3.5 MMOL/L — SIGNIFICANT CHANGE UP (ref 3.5–5.3)
POTASSIUM SERPL-SCNC: 3.5 MMOL/L — SIGNIFICANT CHANGE UP (ref 3.5–5.3)
PROT SERPL-MCNC: 7.2 G/DL — SIGNIFICANT CHANGE UP (ref 6–8.3)
RBC # BLD: 2.92 M/UL — LOW (ref 3.8–5.2)
RBC # FLD: 16.6 % — HIGH (ref 10.3–14.5)
SODIUM SERPL-SCNC: 140 MMOL/L — SIGNIFICANT CHANGE UP (ref 135–145)
WBC # BLD: 13.41 K/UL — HIGH (ref 3.8–10.5)
WBC # FLD AUTO: 13.41 K/UL — HIGH (ref 3.8–10.5)

## 2021-11-09 PROCEDURE — 99233 SBSQ HOSP IP/OBS HIGH 50: CPT

## 2021-11-09 PROCEDURE — 71045 X-RAY EXAM CHEST 1 VIEW: CPT | Mod: 26

## 2021-11-09 RX ORDER — FENTANYL CITRATE 50 UG/ML
50 INJECTION INTRAVENOUS ONCE
Refills: 0 | Status: DISCONTINUED | OUTPATIENT
Start: 2021-11-09 | End: 2021-11-09

## 2021-11-09 RX ORDER — CHLORHEXIDINE GLUCONATE 213 G/1000ML
1 SOLUTION TOPICAL DAILY
Refills: 0 | Status: DISCONTINUED | OUTPATIENT
Start: 2021-11-09 | End: 2021-11-11

## 2021-11-09 RX ORDER — INSULIN LISPRO 100/ML
VIAL (ML) SUBCUTANEOUS EVERY 6 HOURS
Refills: 0 | Status: DISCONTINUED | OUTPATIENT
Start: 2021-11-09 | End: 2021-11-11

## 2021-11-09 RX ORDER — SODIUM CHLORIDE 9 MG/ML
1000 INJECTION, SOLUTION INTRAVENOUS
Refills: 0 | Status: DISCONTINUED | OUTPATIENT
Start: 2021-11-09 | End: 2021-11-10

## 2021-11-09 RX ADMIN — POLYETHYLENE GLYCOL 3350 17 GRAM(S): 17 POWDER, FOR SOLUTION ORAL at 17:42

## 2021-11-09 RX ADMIN — Medication 1 GRAM(S): at 23:58

## 2021-11-09 RX ADMIN — ALBUTEROL 2 PUFF(S): 90 AEROSOL, METERED ORAL at 02:58

## 2021-11-09 RX ADMIN — FENTANYL CITRATE 50 MICROGRAM(S): 50 INJECTION INTRAVENOUS at 05:00

## 2021-11-09 RX ADMIN — CHLORHEXIDINE GLUCONATE 15 MILLILITER(S): 213 SOLUTION TOPICAL at 06:08

## 2021-11-09 RX ADMIN — FENTANYL CITRATE 50 MICROGRAM(S): 50 INJECTION INTRAVENOUS at 04:44

## 2021-11-09 RX ADMIN — SODIUM CHLORIDE 75 MILLILITER(S): 9 INJECTION, SOLUTION INTRAVENOUS at 09:22

## 2021-11-09 RX ADMIN — Medication 2: at 06:08

## 2021-11-09 RX ADMIN — SENNA PLUS 2 TABLET(S): 8.6 TABLET ORAL at 21:53

## 2021-11-09 RX ADMIN — Medication 1: at 12:03

## 2021-11-09 RX ADMIN — Medication 1: at 18:43

## 2021-11-09 RX ADMIN — Medication 1 GRAM(S): at 17:43

## 2021-11-09 RX ADMIN — SIMETHICONE 80 MILLIGRAM(S): 80 TABLET, CHEWABLE ORAL at 06:07

## 2021-11-09 RX ADMIN — Medication 2 MILLIGRAM(S): at 11:33

## 2021-11-09 RX ADMIN — Medication 2 MILLIGRAM(S): at 04:16

## 2021-11-09 RX ADMIN — HEPARIN SODIUM 5000 UNIT(S): 5000 INJECTION INTRAVENOUS; SUBCUTANEOUS at 13:17

## 2021-11-09 RX ADMIN — METHOCARBAMOL 250 MILLIGRAM(S): 500 TABLET, FILM COATED ORAL at 17:42

## 2021-11-09 RX ADMIN — SIMETHICONE 80 MILLIGRAM(S): 80 TABLET, CHEWABLE ORAL at 17:44

## 2021-11-09 RX ADMIN — FENTANYL CITRATE 50 MICROGRAM(S): 50 INJECTION INTRAVENOUS at 05:30

## 2021-11-09 RX ADMIN — Medication 1 MILLIGRAM(S): at 13:17

## 2021-11-09 RX ADMIN — Medication 81 MILLIGRAM(S): at 11:33

## 2021-11-09 RX ADMIN — Medication 1 TABLET(S): at 11:33

## 2021-11-09 RX ADMIN — ALBUTEROL 2 PUFF(S): 90 AEROSOL, METERED ORAL at 13:35

## 2021-11-09 RX ADMIN — Medication 1 GRAM(S): at 11:33

## 2021-11-09 RX ADMIN — METHOCARBAMOL 250 MILLIGRAM(S): 500 TABLET, FILM COATED ORAL at 06:07

## 2021-11-09 RX ADMIN — Medication 1 GRAM(S): at 06:07

## 2021-11-09 RX ADMIN — HEPARIN SODIUM 5000 UNIT(S): 5000 INJECTION INTRAVENOUS; SUBCUTANEOUS at 21:53

## 2021-11-09 RX ADMIN — Medication 1 MILLIGRAM(S): at 06:07

## 2021-11-09 RX ADMIN — Medication 2 MILLIGRAM(S): at 14:16

## 2021-11-09 RX ADMIN — SIMETHICONE 80 MILLIGRAM(S): 80 TABLET, CHEWABLE ORAL at 23:58

## 2021-11-09 RX ADMIN — CHLORHEXIDINE GLUCONATE 1 APPLICATION(S): 213 SOLUTION TOPICAL at 11:33

## 2021-11-09 RX ADMIN — SIMETHICONE 80 MILLIGRAM(S): 80 TABLET, CHEWABLE ORAL at 11:33

## 2021-11-09 RX ADMIN — PANTOPRAZOLE SODIUM 40 MILLIGRAM(S): 20 TABLET, DELAYED RELEASE ORAL at 11:33

## 2021-11-09 RX ADMIN — Medication 2 MILLIGRAM(S): at 17:44

## 2021-11-09 RX ADMIN — HEPARIN SODIUM 5000 UNIT(S): 5000 INJECTION INTRAVENOUS; SUBCUTANEOUS at 06:07

## 2021-11-09 RX ADMIN — Medication 2 MILLIGRAM(S): at 05:00

## 2021-11-09 RX ADMIN — Medication 1 MILLIGRAM(S): at 21:53

## 2021-11-09 RX ADMIN — Medication 1: at 23:59

## 2021-11-09 RX ADMIN — POLYETHYLENE GLYCOL 3350 17 GRAM(S): 17 POWDER, FOR SOLUTION ORAL at 06:07

## 2021-11-09 RX ADMIN — ALBUTEROL 2 PUFF(S): 90 AEROSOL, METERED ORAL at 06:38

## 2021-11-09 RX ADMIN — ALBUTEROL 2 PUFF(S): 90 AEROSOL, METERED ORAL at 20:12

## 2021-11-09 NOTE — PROGRESS NOTE ADULT - SUBJECTIVE AND OBJECTIVE BOX
Patient is a 75y Female whom presented to the hospital with manasa     PAST MEDICAL & SURGICAL HISTORY:  Dementia of frontal lobe type    Aphasic stroke    Diabetes mellitus    Respiratory failure    Hypertension    GERD (gastroesophageal reflux disease)    Constipation    Respiratory failure    CVA (cerebral vascular accident)    HTN (hypertension)    DM (diabetes mellitus)    Advanced dementia    COVID-19 virus detected    Quadriplegia    Pneumonia    Type II diabetes mellitus    Hx of appendectomy    Gastrostomy in place    Tracheostomy in place    Tracheostomy tube present    Feeding by G-tube        MEDICATIONS  (STANDING):  ALBUTerol    90 MICROgram(s) HFA Inhaler 2 Puff(s) Inhalation every 6 hours  albuterol/ipratropium for Nebulization 3 milliLiter(s) Nebulizer every 6 hours  aspirin  chewable 81 milliGRAM(s) Oral daily  chlorhexidine 0.12% Liquid 15 milliLiter(s) Oral Mucosa every 12 hours  dextrose 40% Gel 15 Gram(s) Oral once  dextrose 5% + sodium chloride 0.9%. 1000 milliLiter(s) (80 mL/Hr) IV Continuous <Continuous>  dextrose 5%. 1000 milliLiter(s) (50 mL/Hr) IV Continuous <Continuous>  dextrose 5%. 1000 milliLiter(s) (100 mL/Hr) IV Continuous <Continuous>  dextrose 50% Injectable 25 Gram(s) IV Push once  dextrose 50% Injectable 12.5 Gram(s) IV Push once  dextrose 50% Injectable 25 Gram(s) IV Push once  glucagon  Injectable 1 milliGRAM(s) IntraMuscular once  heparin   Injectable 5000 Unit(s) SubCutaneous every 8 hours  lactobacillus acidophilus 1 Tablet(s) Oral daily  methocarbamol 250 milliGRAM(s) Oral two times a day  pantoprazole   Suspension 40 milliGRAM(s) Oral daily  polyethylene glycol 3350 17 Gram(s) Oral every 12 hours  senna 2 Tablet(s) Oral at bedtime  simethicone 80 milliGRAM(s) Chew every 6 hours  sucralfate suspension 1 Gram(s) Oral four times a day      Allergies    codeine (Hives)    Intolerances        SOCIAL HISTORY:  Denies ETOh,Smoking,     FAMILY HISTORY:      REVIEW OF SYSTEMS:    CONSTITUTIONAL: No weakness, fevers or chills  RESPIRATORY: No cough, wheezing, hemoptysis; No shortness of breath  CARDIOVASCULAR: No chest pain or palpitations  GASTROINTESTINAL: No abdominal or epigastric pain. No nausea, vomiting,                                7.8    13.41 )-----------( 228      ( 09 Nov 2021 05:58 )             26.3       CBC Full  -  ( 09 Nov 2021 05:58 )  WBC Count : 13.41 K/uL  RBC Count : 2.92 M/uL  Hemoglobin : 7.8 g/dL  Hematocrit : 26.3 %  Platelet Count - Automated : 228 K/uL  Mean Cell Volume : 90.1 fl  Mean Cell Hemoglobin : 26.7 pg  Mean Cell Hemoglobin Concentration : 29.7 gm/dL  Auto Neutrophil # : x  Auto Lymphocyte # : x  Auto Monocyte # : x  Auto Eosinophil # : x  Auto Basophil # : x  Auto Neutrophil % : x  Auto Lymphocyte % : x  Auto Monocyte % : x  Auto Eosinophil % : x  Auto Basophil % : x      11-09    140  |  104  |  46<H>  ----------------------------<  193<H>  3.5   |  26  |  1.36<H>    Ca    8.3<L>      09 Nov 2021 05:58  Phos  2.9     11-08    TPro  7.2  /  Alb  2.2<L>  /  TBili  0.6  /  DBili  x   /  AST  50<H>  /  ALT  40  /  AlkPhos  102  11-09      CAPILLARY BLOOD GLUCOSE      POCT Blood Glucose.: 171 mg/dL (09 Nov 2021 11:59)  POCT Blood Glucose.: 204 mg/dL (09 Nov 2021 06:01)  POCT Blood Glucose.: 230 mg/dL (08 Nov 2021 23:19)  POCT Blood Glucose.: 229 mg/dL (08 Nov 2021 15:13)      Vital Signs Last 24 Hrs  T(C): 36.8 (09 Nov 2021 14:00), Max: 38.3 (08 Nov 2021 21:30)  T(F): 98.2 (09 Nov 2021 14:00), Max: 100.9 (08 Nov 2021 21:30)  HR: 83 (09 Nov 2021 14:15) (6 - 104)  BP: 104/63 (09 Nov 2021 14:00) (101/53 - 120/92)  BP(mean): 76 (09 Nov 2021 14:00) (62 - 102)  RR: 33 (09 Nov 2021 14:15) (17 - 45)  SpO2: 100% (09 Nov 2021 14:15) (96% - 100%)        PT/INR - ( 08 Nov 2021 06:26 )   PT: 14.1 sec;   INR: 1.17 ratio             PHYSICAL EXAM:    Constitutional: NAD  HEENT: conjunctive   clear   Neck:  No JVD  Respiratory: pos for trach    Cardiovascular: S1 and S2  Gastrointestinal: BS+, soft, pos for peg   Extremities: No peripheral edema

## 2021-11-09 NOTE — CONSULT NOTE ADULT - SUBJECTIVE AND OBJECTIVE BOX
Patient is a 75y old  Female who presents with a chief complaint of abnormal labs (09 Nov 2021 09:24)      Reason For Consult: dm2 uncontrolled    HPI:  ***Patient with dementia and is not responsive.  Collateral information obtained from chart and notes.***    75F with chronic resp failure - vent dependent, hx of subarachnoid hemorrhage, hx of cardiac arrest, dementia s/p PEG, HTN, DM2 on insulin, hx of CVA, GERD, COPD, admission here from 9/25 to 10/4 for respiratory failure/sepsis possibly 2/2 aspiration vs vent associated PNA who presents from [facility] for abnormal labs.  Unclear as to the reason why labs were done yesterday but patient was found to have an elevated BUN of 42 and then on repeat BUN of 100.  Was sent here for further evaluation.  In the ED, patient's initial triage vitals were BP 97/60, HR 72, RR 18, 100% on vent and T98.5F.  Labs showed leukocytosis of 13.7, BUN/Cr 104/2.45, lactate 3.3.  Patient given 1.8L of NS.  Patient's CT A/P showed no hydronephrosis but showed a calcific density within the right lung which may represent aspirated material (new from previous exam).  Started on vancomycin and zosyn empirically for PNA.  Patient's UA also showed many yeast and was started on fluconazole empirically as well.  Patient's repeat lactate trended down to 1.6 on repeat.  Patient RVP and COVID negative.        Calcific density within the right lung may represent aspirated material. This is new since the previous exam. (07 Nov 2021 01:30)      PAST MEDICAL & SURGICAL HISTORY:  Dementia of frontal lobe type    Aphasic stroke    Diabetes mellitus    Respiratory failure    Hypertension    GERD (gastroesophageal reflux disease)    Constipation    Respiratory failure    CVA (cerebral vascular accident)    HTN (hypertension)    DM (diabetes mellitus)    Advanced dementia    COVID-19 virus detected    Quadriplegia    Pneumonia    Type II diabetes mellitus    Hx of appendectomy    Gastrostomy in place    Tracheostomy in place    Tracheostomy tube present    Feeding by G-tube        FAMILY HISTORY:        Social History:    MEDICATIONS  (STANDING):  ALBUTerol    90 MICROgram(s) HFA Inhaler 2 Puff(s) Inhalation every 6 hours  aspirin  chewable 81 milliGRAM(s) Oral daily  chlorhexidine 2% Cloths 1 Application(s) Topical daily  dextrose 40% Gel 15 Gram(s) Oral once  dextrose 5%. 1000 milliLiter(s) (50 mL/Hr) IV Continuous <Continuous>  dextrose 5%. 1000 milliLiter(s) (100 mL/Hr) IV Continuous <Continuous>  dextrose 50% Injectable 25 Gram(s) IV Push once  dextrose 50% Injectable 12.5 Gram(s) IV Push once  dextrose 50% Injectable 25 Gram(s) IV Push once  glucagon  Injectable 1 milliGRAM(s) IntraMuscular once  heparin   Injectable 5000 Unit(s) SubCutaneous every 8 hours  insulin lispro (ADMELOG) corrective regimen sliding scale   SubCutaneous every 6 hours  lactated ringers. 1000 milliLiter(s) (75 mL/Hr) IV Continuous <Continuous>  lactobacillus acidophilus 1 Tablet(s) Oral daily  LORazepam     Tablet 1 milliGRAM(s) Oral three times a day  LORazepam   Injectable 2 milliGRAM(s) IV Push once  methocarbamol 250 milliGRAM(s) Oral two times a day  pantoprazole   Suspension 40 milliGRAM(s) Oral daily  polyethylene glycol 3350 17 Gram(s) Oral every 12 hours  senna 2 Tablet(s) Oral at bedtime  simethicone 80 milliGRAM(s) Chew every 6 hours  sucralfate suspension 1 Gram(s) Oral four times a day    MEDICATIONS  (PRN):  acetaminophen     Tablet .. 650 milliGRAM(s) Oral every 6 hours PRN Mild Pain (1 - 3)  acetaminophen    Suspension .. 650 milliGRAM(s) Oral every 6 hours PRN Temp greater or equal to 38C (100.4F)  LORazepam   Injectable 2 milliGRAM(s) IV Push every 2 hours PRN Aggitation / Vent Compliance / Tachypnea > 30        T(C): 38.2 (11-09-21 @ 08:00), Max: 38.3 (11-08-21 @ 10:44)  HR: 87 (11-09-21 @ 08:00) (6 - 104)  BP: 103/52 (11-09-21 @ 08:00) (94/49 - 120/92)  RR: 26 (11-09-21 @ 08:00) (17 - 45)  SpO2: 99% (11-09-21 @ 08:00) (96% - 100%)  Wt(kg): --    PHYSICAL EXAM:  NECK: trach  CHEST/LUNG: Clear to percussion bilaterally; No rales, rhonchi, wheezing, or rubs  HEART: Regular rate and rhythm; No murmurs, rubs, or gallops  ABDOMEN: Soft, Nontender, Nondistended; Bowel sounds present  EXTREMITIES:  2+ Peripheral Pulses, No clubbing, cyanosis, or edema  SKIN: No rashes or lesions    CAPILLARY BLOOD GLUCOSE      POCT Blood Glucose.: 204 mg/dL (09 Nov 2021 06:01)  POCT Blood Glucose.: 230 mg/dL (08 Nov 2021 23:19)  POCT Blood Glucose.: 229 mg/dL (08 Nov 2021 15:13)  POCT Blood Glucose.: 233 mg/dL (08 Nov 2021 13:04)                            7.8    13.41 )-----------( 228      ( 09 Nov 2021 05:58 )             26.3       CMP:  11-09 @ 05:58  SGPT 40  Albumin 2.2   Alk Phos 102   Anion Gap 10   SGOT 50   Total Bili 0.6   BUN 46   Calcium Total 8.3   CO2 26   Chloride 104   Creatinine 1.36   eGFR if AA 43   eGFR if non AA 37   Glucose 193   Potassium 3.5   Protein 7.2   Sodium 140      Thyroid Function Tests:      Diabetes Tests:       Radiology:

## 2021-11-09 NOTE — PROGRESS NOTE ADULT - SUBJECTIVE AND OBJECTIVE BOX
Patient is a 75y old  Female who presents with a chief complaint of abnormal labs (09 Nov 2021 10:41)      INTERVAL HPI/OVERNIGHT EVENTS:    fevers overnight.  Seen by ID, continue to stay off abx for now.      MEDICATIONS  (STANDING):  ALBUTerol    90 MICROgram(s) HFA Inhaler 2 Puff(s) Inhalation every 6 hours  aspirin  chewable 81 milliGRAM(s) Oral daily  chlorhexidine 2% Cloths 1 Application(s) Topical daily  dextrose 40% Gel 15 Gram(s) Oral once  dextrose 5%. 1000 milliLiter(s) (50 mL/Hr) IV Continuous <Continuous>  dextrose 5%. 1000 milliLiter(s) (100 mL/Hr) IV Continuous <Continuous>  dextrose 50% Injectable 25 Gram(s) IV Push once  dextrose 50% Injectable 12.5 Gram(s) IV Push once  dextrose 50% Injectable 25 Gram(s) IV Push once  glucagon  Injectable 1 milliGRAM(s) IntraMuscular once  heparin   Injectable 5000 Unit(s) SubCutaneous every 8 hours  insulin lispro (ADMELOG) corrective regimen sliding scale   SubCutaneous every 6 hours  lactated ringers. 1000 milliLiter(s) (75 mL/Hr) IV Continuous <Continuous>  lactobacillus acidophilus 1 Tablet(s) Oral daily  LORazepam     Tablet 1 milliGRAM(s) Oral three times a day  LORazepam   Injectable 2 milliGRAM(s) IV Push once  methocarbamol 250 milliGRAM(s) Oral two times a day  pantoprazole   Suspension 40 milliGRAM(s) Oral daily  polyethylene glycol 3350 17 Gram(s) Oral every 12 hours  senna 2 Tablet(s) Oral at bedtime  simethicone 80 milliGRAM(s) Chew every 6 hours  sucralfate suspension 1 Gram(s) Oral four times a day    MEDICATIONS  (PRN):  acetaminophen     Tablet .. 650 milliGRAM(s) Oral every 6 hours PRN Mild Pain (1 - 3)  acetaminophen    Suspension .. 650 milliGRAM(s) Oral every 6 hours PRN Temp greater or equal to 38C (100.4F)  LORazepam   Injectable 2 milliGRAM(s) IV Push every 2 hours PRN Aggitation / Vent Compliance / Tachypnea > 30      Allergies    codeine (Hives)    Intolerances      Vital Signs Last 24 Hrs  T(C): 38.2 (09 Nov 2021 08:00), Max: 38.3 (08 Nov 2021 21:30)  T(F): 100.7 (09 Nov 2021 08:00), Max: 100.9 (08 Nov 2021 21:30)  HR: 81 (09 Nov 2021 12:00) (6 - 104)  BP: 104/56 (09 Nov 2021 12:00) (94/49 - 120/92)  BP(mean): 71 (09 Nov 2021 12:00) (62 - 102)  RR: 21 (09 Nov 2021 12:00) (17 - 45)  SpO2: 100% (09 Nov 2021 12:00) (96% - 100%)    PHYSICAL EXAM:  GENERAL:     chronically ill appearing, emaciated, toxic appearing female with +trach +PEG  HEAD:     atraumatic  EYES:    conjunctiva clear  NECK:     +trach in place, clean  RESPIRATORY:     +vented sounds (coarse sounds), no gross crackles or rales  CARDIOVASCULAR:     regular rate and rhythm, no murmurs or rubs or gallops, 2+ peripheral pulses  GASTROINTESTINAL:     soft, +PEG in place, clean and dry as well, nondistended, no hepatosplenomegaly palpated, bowel sounds present  EXTREMITIES:     no clubbing or cyanosis or edema  MUSCULOSKELETAL:     +diffuse contractures in all joints  NERVOUS SYSTEM:     motor strength intact with 5/5 B/L upper and lower extremities, no gross sensory deficits  SKIN:     necrotic tips of bilateral 1st toes  PSYCH:     non-responsive, barely responds to any stimuli    LABS:                        7.8    13.41 )-----------( 228      ( 09 Nov 2021 05:58 )             26.3     09 Nov 2021 05:58    140    |  104    |  46     ----------------------------<  193    3.5     |  26     |  1.36     Ca    8.3        09 Nov 2021 05:58    TPro  7.2    /  Alb  2.2    /  TBili  0.6    /  DBili  x      /  AST  50     /  ALT  40     /  AlkPhos  102    09 Nov 2021 05:58    PT/INR - ( 08 Nov 2021 06:26 )   PT: 14.1 sec;   INR: 1.17 ratio             CAPILLARY BLOOD GLUCOSE      POCT Blood Glucose.: 171 mg/dL (09 Nov 2021 11:59)  POCT Blood Glucose.: 204 mg/dL (09 Nov 2021 06:01)  POCT Blood Glucose.: 230 mg/dL (08 Nov 2021 23:19)  POCT Blood Glucose.: 229 mg/dL (08 Nov 2021 15:13)  POCT Blood Glucose.: 233 mg/dL (08 Nov 2021 13:04)      RADIOLOGY & ADDITIONAL TESTS:

## 2021-11-09 NOTE — PROGRESS NOTE ADULT - SUBJECTIVE AND OBJECTIVE BOX
Date/Time Patient Seen:  		  Referring MD:   Data Reviewed	       Patient is a 75y old  Female who presents with a chief complaint of abnormal labs (08 Nov 2021 19:29)      Subjective/HPI     PAST MEDICAL & SURGICAL HISTORY:  Dementia of frontal lobe type    Aphasic stroke    Diabetes mellitus    Respiratory failure    Hypertension    GERD (gastroesophageal reflux disease)    Constipation    Respiratory failure    CVA (cerebral vascular accident)    HTN (hypertension)    DM (diabetes mellitus)    Advanced dementia    COVID-19 virus detected    Quadriplegia    Pneumonia    Type II diabetes mellitus    Hx of appendectomy    Gastrostomy in place    Tracheostomy in place    Tracheostomy tube present    Feeding by G-tube          Medication list         MEDICATIONS  (STANDING):  ALBUTerol    90 MICROgram(s) HFA Inhaler 2 Puff(s) Inhalation every 6 hours  aspirin  chewable 81 milliGRAM(s) Oral daily  chlorhexidine 0.12% Liquid 15 milliLiter(s) Oral Mucosa every 12 hours  chlorhexidine 2% Cloths 1 Application(s) Topical daily  dextrose 40% Gel 15 Gram(s) Oral once  dextrose 5%. 1000 milliLiter(s) (50 mL/Hr) IV Continuous <Continuous>  dextrose 5%. 1000 milliLiter(s) (100 mL/Hr) IV Continuous <Continuous>  dextrose 50% Injectable 25 Gram(s) IV Push once  dextrose 50% Injectable 12.5 Gram(s) IV Push once  dextrose 50% Injectable 25 Gram(s) IV Push once  glucagon  Injectable 1 milliGRAM(s) IntraMuscular once  heparin   Injectable 5000 Unit(s) SubCutaneous every 8 hours  insulin lispro (ADMELOG) corrective regimen sliding scale   SubCutaneous every 6 hours  lactobacillus acidophilus 1 Tablet(s) Oral daily  LORazepam     Tablet 1 milliGRAM(s) Oral three times a day  LORazepam   Injectable 2 milliGRAM(s) IV Push once  methocarbamol 250 milliGRAM(s) Oral two times a day  pantoprazole   Suspension 40 milliGRAM(s) Oral daily  polyethylene glycol 3350 17 Gram(s) Oral every 12 hours  senna 2 Tablet(s) Oral at bedtime  simethicone 80 milliGRAM(s) Chew every 6 hours  sodium chloride 0.45%. 1000 milliLiter(s) (80 mL/Hr) IV Continuous <Continuous>  sucralfate suspension 1 Gram(s) Oral four times a day    MEDICATIONS  (PRN):  acetaminophen     Tablet .. 650 milliGRAM(s) Oral every 6 hours PRN Mild Pain (1 - 3)  acetaminophen    Suspension .. 650 milliGRAM(s) Oral every 6 hours PRN Temp greater or equal to 38C (100.4F)  LORazepam   Injectable 2 milliGRAM(s) IV Push every 2 hours PRN Aggitation / Vent Compliance / Tachypnea > 30         Vitals log        ICU Vital Signs Last 24 Hrs  T(C): 37.1 (09 Nov 2021 04:00), Max: 38.7 (08 Nov 2021 09:09)  T(F): 98.8 (09 Nov 2021 04:00), Max: 101.7 (08 Nov 2021 09:09)  HR: 88 (09 Nov 2021 06:44) (6 - 104)  BP: 102/45 (09 Nov 2021 06:05) (94/49 - 120/92)  BP(mean): 62 (09 Nov 2021 06:05) (62 - 102)  ABP: --  ABP(mean): --  RR: 18 (09 Nov 2021 06:05) (13 - 45)  SpO2: 100% (09 Nov 2021 06:44) (96% - 100%)       Mode: PRVC  RR (machine): 18  TV (machine): 400  FiO2: 40  PEEP: 5  ITime: 1  MAP: 13  PIP: 28      Input and Output:  I&O's Detail    08 Nov 2021 07:01  -  09 Nov 2021 07:00  --------------------------------------------------------  IN:    dextrose 5% + sodium chloride 0.45%: 160 mL    dextrose 5% + sodium chloride 0.9%: 160 mL    Enteral Tube Flush: 1007 mL    Glucerna 1.5: 1150 mL    sodium chloride 0.45%: 1200 mL  Total IN: 3677 mL    OUT:    Voided (mL): 600 mL  Total OUT: 600 mL    Total NET: 3077 mL          Lab Data                        7.8    13.41 )-----------( 228      ( 09 Nov 2021 05:58 )             26.3     11-09    140  |  104  |  46<H>  ----------------------------<  193<H>  3.5   |  26  |  1.36<H>    Ca    8.3<L>      09 Nov 2021 05:58  Phos  2.9     11-08    TPro  7.2  /  Alb  2.2<L>  /  TBili  0.6  /  DBili  x   /  AST  50<H>  /  ALT  40  /  AlkPhos  102  11-09            Review of Systems	      Objective     Physical Examination  heart s1s2  lung dec BS  abd soft  trach and peg        Pertinent Lab findings & Imaging      Annalise:  NO   Adequate UO     I&O's Detail    08 Nov 2021 07:01  -  09 Nov 2021 07:00  --------------------------------------------------------  IN:    dextrose 5% + sodium chloride 0.45%: 160 mL    dextrose 5% + sodium chloride 0.9%: 160 mL    Enteral Tube Flush: 1007 mL    Glucerna 1.5: 1150 mL    sodium chloride 0.45%: 1200 mL  Total IN: 3677 mL    OUT:    Voided (mL): 600 mL  Total OUT: 600 mL    Total NET: 3077 mL               Discussed with:     Cultures:	        Radiology

## 2021-11-09 NOTE — PROVIDER CONTACT NOTE (EICU) - BACKGROUND
called for agitation, gave 50 fent, still agitated wrote for 2 mg ativan and 50 mcg fent, she did this yesterday and responded to fent and ativan. VSS stable

## 2021-11-09 NOTE — CONSULT NOTE ADULT - CONSULT REASON
Abnormal labs
?PNA
DYSP
acute infection eval  chr resp failure  ams  dysphagia  atelectasis  GOC discussion
vent dependant pt
ckd and manasa
hypoglycemia/dm2 uncontrolled

## 2021-11-09 NOTE — PROGRESS NOTE ADULT - SUBJECTIVE AND OBJECTIVE BOX
HPI: 74 y/o F w/ pmh of htn, dm, CVA, dementia, recent hx of cardiac arrest at [facility], chronic resp failure s/p trach/peg presented to [facility] on 11/06/2021 from SNF for abnormal labs, pt was found to have RYAN, dehydration possible UTI and asp vs vent associated pna.    24 hour events: pt seen and examined  tonight comfortable in bed HDS no acute events reported from day shift.    Review of Systems: unable to obtain ROS pt trached/pegged    T(F): 98.6 (11-09-21 @ 21:23), Max: 100.7 (11-09-21 @ 08:00)  HR: 72 (11-09-21 @ 20:40) (6 - 104)  BP: 121/62 (11-09-21 @ 20:00) (98/54 - 121/62)  RR: 15 (11-09-21 @ 20:00) (14 - 45)  SpO2: 99% (11-09-21 @ 20:40) (96% - 100%)  Wt(kg): --    Mode: PRVC, RR (machine): 18, TV (machine): 400, FiO2: 40, PEEP: 5  11-08-21 @ 07:01  -  11-09-21 @ 07:00  --------------------------------------------------------  IN: 3677 mL / OUT: 600 mL / NET: 3077 mL    11-09-21 @ 07:01  -  11-09-21 @ 21:41  --------------------------------------------------------  IN: 2260 mL / OUT: 0 mL / NET: 2260 mL        CAPILLARY BLOOD GLUCOSE      POCT Blood Glucose.: 169 mg/dL (09 Nov 2021 18:28)      I&O's Summary    08 Nov 2021 07:01  -  09 Nov 2021 07:00  --------------------------------------------------------  IN: 3677 mL / OUT: 600 mL / NET: 3077 mL    09 Nov 2021 07:01  -  09 Nov 2021 21:41  --------------------------------------------------------  IN: 2260 mL / OUT: 0 mL / NET: 2260 mL        Physical Exam:   Gen: Comfortable in bed in NAD  HEENT: PERRL  Resp: good air entry b/l  CVS: +RRR  Abd: BSx4, soft, nt/nd  Ext: +edema   Skin: warm/dry    Meds:      dextrose 40% Gel Oral  dextrose 50% Injectable IV Push  dextrose 50% Injectable IV Push  dextrose 50% Injectable IV Push  glucagon  Injectable IntraMuscular  insulin lispro (ADMELOG) corrective regimen sliding scale SubCutaneous    ALBUTerol    90 MICROgram(s) HFA Inhaler Inhalation    acetaminophen     Tablet .. Oral PRN  acetaminophen    Suspension .. Oral PRN  LORazepam     Tablet Oral  LORazepam   Injectable IV Push PRN  LORazepam   Injectable IV Push  methocarbamol Oral      aspirin  chewable Oral  heparin   Injectable SubCutaneous    pantoprazole   Suspension Oral  polyethylene glycol 3350 Oral  senna Oral  simethicone Chew  sucralfate suspension Oral      dextrose 5%. IV Continuous  dextrose 5%. IV Continuous  lactated ringers. IV Continuous      chlorhexidine 2% Cloths Topical    lactobacillus acidophilus Oral                            7.8    13.41 )-----------( 228      ( 09 Nov 2021 05:58 )             26.3       11-09    140  |  104  |  46<H>  ----------------------------<  193<H>  3.5   |  26  |  1.36<H>    Ca    8.3<L>      09 Nov 2021 05:58  Phos  2.9     11-08    TPro  7.2  /  Alb  2.2<L>  /  TBili  0.6  /  DBili  x   /  AST  50<H>  /  ALT  40  /  AlkPhos  102  11-09          PT/INR - ( 08 Nov 2021 06:26 )   PT: 14.1 sec;   INR: 1.17 ratio             .Sputum Sputum   Normal Respiratory Elvira present   Few polymorphonuclear leukocytes per low power field  No Squamous epithelial cells per low power field  Rare Gram Variable Rods per oil power field 11-08 @ 13:11  .Blood Blood-Peripheral   No growth to date. -- 11-07 @ 12:02      Rapid RVP Result: NotDetec (11-06 @ 23:12)    Radiology:     < from: CT Chest No Cont (11.07.21 @ 02:52) >  EXAM:  CT CHEST                          PROCEDURE DATE:  11/07/2021        INTERPRETATION:  CLINICAL INFORMATION: Elevated white count. Acute kidney failure    COMPARISON: CT abdomen pelvis 11/7/2021. CT chest abdomen and pelvis 9/25/2021.    CONTRAST/COMPLICATIONS:  IV Contrast: NONE  Oral Contrast: NONE  Complications: None reported at time of study completion    PROCEDURE:  CT of the Chest was performed.  Sagittal and coronal reformats were performed.    FINDINGS:    LUNGS AND AIRWAYS: Tracheostomy tube in place. Some layering fluid within the trachea. Patent central airways.  Bibasilar atelectasis. Calcific density material in the right lower lobe since the previous chest CT possibly representing aspirated material.  Previously seen bilateral patchy airspace opacities in a perihilar distribution have almost completely resolved since the previous chest CT.  PLEURA: Small left effusion with associated atelectasis not significantly changed. Trace right pleural effusion..  MEDIASTINUM AND JHONNY: Enlarged mediastinal lymph nodes are unchanged since the previous chest CT. Air dilated esophagus, unchanged.  VESSELS: Within normal limits.  HEART: Heart size is normal. No pericardial effusion.  CHEST WALL AND LOWER NECK: Withinnormal limits.  VISUALIZED UPPER ABDOMEN: Within normal limits.  BONES: Degenerative changes.    IMPRESSION:    Bilateral effusions and bibasilar atelectasis not significantly changed. Calcific density within the right lower lobe is new since the previous chest CT, and may represent aspirated material. Developing aspiration pneumonia cannot be excluded. Correlate clinically.    Previously seen bilateral patchy airspace opacities in a perihilar distribution have almost completely resolved since the previous chest CT.    --- End of Report ---      JOSHUA MERCADO MD; Attending Radiologist  This document has been electronically signed. Nov 7 2021  4:25AM    < end of copied text >    < from: CT Renal Stone Hunt (11.07.21 @ 01:08) >  PROCEDURE DATE:  11/07/2021          INTERPRETATION:  CLINICAL INFORMATION: Renal insufficiency. Evaluate for hydronephrosis    COMPARISON: CT chest abdomen and pelvis 9/25/2021.    CONTRAST/COMPLICATIONS:  IV Contrast: NONE  Oral Contrast: NONE  Complications: None reported at time of study completion    PROCEDURE:  CT of the Abdomen and Pelvis was performed.  Sagittal and coronal reformats were performed.    FINDINGS:  LOWER CHEST: Small bilateral pleural effusions and associated atelectasis. Calcific density within the right lung may represent aspirated material. This is new since the previous exam.    LIVER: Within normal limits.  BILE DUCTS: Normal caliber.  GALLBLADDER: Within normal limits.  SPLEEN: Within normal limits.  PANCREAS: Within normal limits.  ADRENALS: Within normal limits.  KIDNEYS/URETERS: Within normal limits.    BLADDER: The bladder is collapsed around a Reid catheter limiting evaluation..  REPRODUCTIVE ORGANS: Uterus and adnexa within normal limits.    BOWEL: Gastrostomy tube. The rectum is mildly distended with fluid similar to the previous exam. No bowel obstruction. Appendix  PERITONEUM: No ascites.  VESSELS: Within normal limits.  RETROPERITONEUM/LYMPH NODES: No lymphadenopathy.  ABDOMINAL WALL: Within normal limits.  BONES: Degenerative changes. Osteopenia. Posttraumatic changes to the left femur are stable.    IMPRESSION:    No hydronephrosis as clinically questioned.      --- End of Report ---    JOSHUA MERCADO MD; Attending Radiologist    < end of copied text >    CENTRAL LINE: N  REID: Y  A-LINE: N    GLOBAL ISSUE/BEST PRACTICE:  Analgesia: Y  Sedation: N  CAM-ICU: n/a   HOB elevation: Y  Stress ulcer prophylaxis: Y  VTE prophylaxis: Y  Glycemic control: Y  Nutrition: Y    CODE STATUS: FULL CODE

## 2021-11-09 NOTE — CHART NOTE - NSCHARTNOTEFT_GEN_A_CORE
LUE midline removed, post removal midline tip intact no active signs of bleeding or hematoma, pressure dressing applied post removal, no signs of surrounding infections

## 2021-11-09 NOTE — CONSULT NOTE ADULT - CONSULT REQUESTED DATE/TIME
07-Nov-2021
07-Nov-2021 09:20
07-Nov-2021 09:41
07-Nov-2021 05:54
07-Nov-2021 08:08
07-Nov-2021 15:58
09-Nov-2021 10:29

## 2021-11-09 NOTE — PROGRESS NOTE ADULT - SUBJECTIVE AND OBJECTIVE BOX
[facility] DIVISION of INFECTIOUS DISEASE  Arturo Olivas MD PhD, Haylee Umanzor MD, Andressa Brantley MD, Billy Valenzuela MD, Emil Medellin MD  and providing coverage with Alexa Canas MD and Eusebio Chairez MD  Providing Infectious Disease Consultations at [facility], [facility]    Office# [phone] to schedule follow up appointments  Answering Service for urgent calls or New Consults [phone]  Cell# to text for urgent issues Arturo Olivas [phone]     infectious diseases progress note:    BREA BECKHAM is a 75y y. o. Female patient    Pt with fevers overnight    Allergies    codeine (Hives)    Intolerances        ANTIBIOTICS/RELEVANT:  antimicrobials    immunologic:    OTHER:  acetaminophen     Tablet .. 650 milliGRAM(s) Oral every 6 hours PRN  acetaminophen    Suspension .. 650 milliGRAM(s) Oral every 6 hours PRN  ALBUTerol    90 MICROgram(s) HFA Inhaler 2 Puff(s) Inhalation every 6 hours  aspirin  chewable 81 milliGRAM(s) Oral daily  chlorhexidine 0.12% Liquid 15 milliLiter(s) Oral Mucosa every 12 hours  chlorhexidine 2% Cloths 1 Application(s) Topical daily  dextrose 40% Gel 15 Gram(s) Oral once  dextrose 5%. 1000 milliLiter(s) IV Continuous <Continuous>  dextrose 5%. 1000 milliLiter(s) IV Continuous <Continuous>  dextrose 50% Injectable 25 Gram(s) IV Push once  dextrose 50% Injectable 12.5 Gram(s) IV Push once  dextrose 50% Injectable 25 Gram(s) IV Push once  glucagon  Injectable 1 milliGRAM(s) IntraMuscular once  heparin   Injectable 5000 Unit(s) SubCutaneous every 8 hours  insulin lispro (ADMELOG) corrective regimen sliding scale   SubCutaneous every 6 hours  lactated ringers. 1000 milliLiter(s) IV Continuous <Continuous>  lactobacillus acidophilus 1 Tablet(s) Oral daily  LORazepam     Tablet 1 milliGRAM(s) Oral three times a day  LORazepam   Injectable 2 milliGRAM(s) IV Push every 2 hours PRN  LORazepam   Injectable 2 milliGRAM(s) IV Push once  methocarbamol 250 milliGRAM(s) Oral two times a day  pantoprazole   Suspension 40 milliGRAM(s) Oral daily  polyethylene glycol 3350 17 Gram(s) Oral every 12 hours  senna 2 Tablet(s) Oral at bedtime  simethicone 80 milliGRAM(s) Chew every 6 hours  sucralfate suspension 1 Gram(s) Oral four times a day      Objective:  Vital Signs Last 24 Hrs  T(C): 38.2 (09 Nov 2021 08:00), Max: 38.3 (08 Nov 2021 10:44)  T(F): 100.7 (09 Nov 2021 08:00), Max: 101 (08 Nov 2021 10:44)  HR: 87 (09 Nov 2021 08:00) (6 - 104)  BP: 103/52 (09 Nov 2021 08:00) (94/49 - 120/92)  BP(mean): 68 (09 Nov 2021 08:00) (62 - 102)  RR: 26 (09 Nov 2021 08:00) (17 - 45)  SpO2: 99% (09 Nov 2021 08:00) (96% - 100%)    T(C): 38.2 (11-09-21 @ 08:00), Max: 38.7 (11-08-21 @ 09:09)  T(C): 38.2 (11-09-21 @ 08:00), Max: 38.7 (11-08-21 @ 09:09)  T(C): 38.2 (11-09-21 @ 08:00), Max: 38.7 (11-08-21 @ 09:09)    PHYSICAL EXAM:  HEENT: NC atraumatic  Neck: supple, intubated via trach  Respiratory: no accessory muscle use, breathing comfortably on vent  Cardiovascular: distant  Gastrointestinal: normal appearing, nondistended  Extremities: no clubbing, no cyanosis,    Mode: PRVC, RR (machine): 18, TV (machine): 400, FiO2: 40, PEEP: 5, ITime: 1, MAP: 13, PIP: 28    LABS:                          7.8    13.41 )-----------( 228      ( 09 Nov 2021 05:58 )             26.3       13.41 11-09 @ 05:58  7.80 11-08 @ 06:26  10.59 11-07 @ 06:39  13.77 11-06 @ 23:11      11-09    140  |  104  |  46<H>  ----------------------------<  193<H>  3.5   |  26  |  1.36<H>    Ca    8.3<L>      09 Nov 2021 05:58  Phos  2.9     11-08    TPro  7.2  /  Alb  2.2<L>  /  TBili  0.6  /  DBili  x   /  AST  50<H>  /  ALT  40  /  AlkPhos  102  11-09      Creatinine, Serum: 1.36 mg/dL (11-09-21 @ 05:58)  Creatinine, Serum: 1.35 mg/dL (11-08-21 @ 16:38)  Creatinine, Serum: 1.54 mg/dL (11-08-21 @ 06:26)  Creatinine, Serum: 1.68 mg/dL (11-07-21 @ 11:37)  Creatinine, Serum: 1.85 mg/dL (11-07-21 @ 06:39)  Creatinine, Serum: 2.45 mg/dL (11-06-21 @ 23:11)      PT/INR - ( 08 Nov 2021 06:26 )   PT: 14.1 sec;   INR: 1.17 ratio                   INFLAMMATORY MARKERS  Auto Neutrophil #: 6.05 K/uL (11-08-21 @ 06:26)  Auto Lymphocyte #: 1.14 K/uL (11-08-21 @ 06:26)  Auto Lymphocyte #: 2.16 K/uL (11-07-21 @ 06:39)  Auto Neutrophil #: 7.77 K/uL (11-07-21 @ 06:39)  Auto Lymphocyte #: 2.19 K/uL (11-06-21 @ 23:11)  Auto Neutrophil #: 10.60 K/uL (11-06-21 @ 23:11)    Lactate, Blood: 1.6 mmol/L (11-07-21 @ 01:45)  Lactate, Blood: 3.3 mmol/L (11-06-21 @ 23:11)    Auto Eosinophil #: 0.07 K/uL (11-08-21 @ 06:26)  Auto Eosinophil #: 0.09 K/uL (11-07-21 @ 06:39)  Auto Eosinophil #: 0.09 K/uL (11-06-21 @ 23:11)        Procalcitonin, Serum: 17.60 ng/mL (11-08-21 @ 12:45)      INR: 1.17 ratio (11-08-21 @ 06:26)  Activated Partial Thromboplastin Time: 42.3 sec (11-06-21 @ 23:11)  INR: 1.16 ratio (11-06-21 @ 23:11)          MICROBIOLOGY:    Culture - Sputum . (11.08.21 @ 13:11)    Gram Stain:   Few polymorphonuclear leukocytes per low power field  No Squamous epithelial cells per low power field  Rare Gram Variable Rods per oil power field    Specimen Source: .Sputum Sputum        RADIOLOGY & ADDITIONAL STUDIES:

## 2021-11-09 NOTE — PROGRESS NOTE ADULT - SUBJECTIVE AND OBJECTIVE BOX
Chief Complaint: Abnormal labs    Interval Events: No events overnight.    Review of Systems:  Unable to obtain    Physical Exam:  Vital Signs Last 24 Hrs  T(C): 38.2 (09 Nov 2021 08:00), Max: 38.7 (08 Nov 2021 09:09)  T(F): 100.7 (09 Nov 2021 08:00), Max: 101.7 (08 Nov 2021 09:09)  HR: 87 (09 Nov 2021 08:00) (6 - 104)  BP: 103/52 (09 Nov 2021 08:00) (94/49 - 120/92)  BP(mean): 68 (09 Nov 2021 08:00) (62 - 102)  RR: 26 (09 Nov 2021 08:00) (17 - 45)  SpO2: 99% (09 Nov 2021 08:00) (96% - 100%)  General: NAD  HEENT: Trach  Neck: No JVD, no carotid bruit  Lungs: CTAB  CV: RRR, nl S1/S2, no M/R/G  Abdomen: S/NT/ND, +BS  Extremities: No LE edema, no cyanosis  Neuro: AAOx0  Skin: No rash    Labs:    11-09    140  |  104  |  46<H>  ----------------------------<  193<H>  3.5   |  26  |  1.36<H>    Ca    8.3<L>      09 Nov 2021 05:58  Phos  2.9     11-08    TPro  7.2  /  Alb  2.2<L>  /  TBili  0.6  /  DBili  x   /  AST  50<H>  /  ALT  40  /  AlkPhos  102  11-09                        7.8    13.41 )-----------( 228      ( 09 Nov 2021 05:58 )             26.3       Telemetry: Sinus rhythm

## 2021-11-09 NOTE — PROGRESS NOTE ADULT - SUBJECTIVE AND OBJECTIVE BOX
BREA BECKHAM    [facility]    Allergies    codeine (Hives)    Intolerances        PAST MEDICAL & SURGICAL HISTORY:  Dementia of frontal lobe type    Aphasic stroke    Diabetes mellitus    Respiratory failure    Hypertension    GERD (gastroesophageal reflux disease)    Constipation    Respiratory failure    CVA (cerebral vascular accident)    HTN (hypertension)    DM (diabetes mellitus)    Advanced dementia    COVID-19 virus detected    Quadriplegia    Pneumonia    Type II diabetes mellitus    Hx of appendectomy    Gastrostomy in place    Tracheostomy in place    Tracheostomy tube present    Feeding by G-tube        FAMILY HISTORY:      Home Medications:  acetaminophen 160 mg/5 mL oral suspension: 20.31 milliliter(s) by gastrostomy tube every 6 hours, As Needed (07 Nov 2021 00:03)  albuterol 90 mcg/inh inhalation aerosol: 2 puff(s) inhaled every 6 hours (07 Nov 2021 00:03)  aspirin 81 mg oral tablet, chewable: 1 tab(s) by PEG tube once a day (07 Nov 2021 00:03)  Bacid (LAC) oral tablet: 2 tab(s) by gastrostomy tube once a day (07 Nov 2021 00:03)  Carafate 1 g/10 mL oral suspension: 10 milliliter(s) by gastrostomy tube 4 times a day (before meals and at bedtime) for 14 days (Started 6/4/21) (07 Nov 2021 00:03)  chlorhexidine 0.12% mucous membrane liquid: 15 milliliter(s) mucous membrane 2 times a day (07 Nov 2021 00:03)  insulin glargine: 36 unit(s) subcutaneous once a day (at bedtime) (07 Nov 2021 00:03)  insulin lispro 100 units/mL injectable solution:  injectable; sliding scale coverage  (07 Nov 2021 00:03)  ipratropium-albuterol 0.5 mg-2.5 mg/3 mL inhalation solution: 3 milliliter(s) inhaled 4 times a day (07 Nov 2021 00:03)  LORazepam 1 mg oral tablet: 1 tab(s) by gastrostomy tube 3 times a day, As Needed (07 Nov 2021 01:48)  methocarbamol: 250 milligram(s) by gastrostomy tube 2 times a day (07 Nov 2021 00:03)  pantoprazole 40 mg oral granule, delayed release: 40 milligram(s) by gastrostomy tube 2 times a day (07 Nov 2021 00:03)  polyethylene glycol 3350 oral powder for reconstitution: 17 gram(s) orally every 12 hours (07 Nov 2021 00:03)  senna 8.6 mg oral tablet: 3 tab(s) by gastrostomy tube once a day (at bedtime) (07 Nov 2021 01:48)  simethicone 80 mg oral tablet, chewable: 1 tab(s) orally every 6 hours (07 Nov 2021 00:03)  Tylenol 325 mg oral tablet: 2 tab(s) by gastrostomy tube once a day; 60 minutes prior to dressing change  (07 Nov 2021 01:48)      MEDICATIONS  (STANDING):  ALBUTerol    90 MICROgram(s) HFA Inhaler 2 Puff(s) Inhalation every 6 hours  aspirin  chewable 81 milliGRAM(s) Oral daily  chlorhexidine 2% Cloths 1 Application(s) Topical daily  dextrose 40% Gel 15 Gram(s) Oral once  dextrose 5%. 1000 milliLiter(s) (50 mL/Hr) IV Continuous <Continuous>  dextrose 5%. 1000 milliLiter(s) (100 mL/Hr) IV Continuous <Continuous>  dextrose 50% Injectable 25 Gram(s) IV Push once  dextrose 50% Injectable 12.5 Gram(s) IV Push once  dextrose 50% Injectable 25 Gram(s) IV Push once  glucagon  Injectable 1 milliGRAM(s) IntraMuscular once  heparin   Injectable 5000 Unit(s) SubCutaneous every 8 hours  insulin lispro (ADMELOG) corrective regimen sliding scale   SubCutaneous every 6 hours  lactated ringers. 1000 milliLiter(s) (75 mL/Hr) IV Continuous <Continuous>  lactobacillus acidophilus 1 Tablet(s) Oral daily  LORazepam     Tablet 1 milliGRAM(s) Oral three times a day  LORazepam   Injectable 2 milliGRAM(s) IV Push once  methocarbamol 250 milliGRAM(s) Oral two times a day  pantoprazole   Suspension 40 milliGRAM(s) Oral daily  polyethylene glycol 3350 17 Gram(s) Oral every 12 hours  senna 2 Tablet(s) Oral at bedtime  simethicone 80 milliGRAM(s) Chew every 6 hours  sucralfate suspension 1 Gram(s) Oral four times a day    MEDICATIONS  (PRN):  acetaminophen     Tablet .. 650 milliGRAM(s) Oral every 6 hours PRN Mild Pain (1 - 3)  acetaminophen    Suspension .. 650 milliGRAM(s) Oral every 6 hours PRN Temp greater or equal to 38C (100.4F)  LORazepam   Injectable 2 milliGRAM(s) IV Push every 2 hours PRN Aggitation / Vent Compliance / Tachypnea > 30      Diet, NPO:   Tube Feeding Modality: Gastrostomy  Glucerna 1.5 Horacio  Total Volume for 24 Hours (mL): 1200  Continuous  Starting Tube Feed Rate mL per Hour: 50  Until Goal Tube Feed Rate (mL per Hour): 50  Tube Feed Duration (in Hours): 24  Tube Feed Start Time: 16:00  Free Water Flush  Bolus   Total Volume per Flush (mL): 300   Frequency: Every 8 Hours (11-07-21 @ 01:59) [Active]          Vital Signs Last 24 Hrs  T(C): 38.2 (09 Nov 2021 08:00), Max: 38.3 (08 Nov 2021 10:44)  T(F): 100.7 (09 Nov 2021 08:00), Max: 101 (08 Nov 2021 10:44)  HR: 87 (09 Nov 2021 08:00) (6 - 104)  BP: 103/52 (09 Nov 2021 08:00) (94/49 - 120/92)  BP(mean): 68 (09 Nov 2021 08:00) (62 - 102)  RR: 26 (09 Nov 2021 08:00) (17 - 45)  SpO2: 99% (09 Nov 2021 08:00) (96% - 100%)      11-08-21 @ 07:01  -  11-09-21 @ 07:00  --------------------------------------------------------  IN: 3677 mL / OUT: 600 mL / NET: 3077 mL        Mode: PRVC, RR (machine): 18, TV (machine): 400, FiO2: 40, PEEP: 5, ITime: 1, MAP: 13, PIP: 28      LABS:                        7.8    13.41 )-----------( 228      ( 09 Nov 2021 05:58 )             26.3     11-09    140  |  104  |  46<H>  ----------------------------<  193<H>  3.5   |  26  |  1.36<H>    Ca    8.3<L>      09 Nov 2021 05:58  Phos  2.9     11-08    TPro  7.2  /  Alb  2.2<L>  /  TBili  0.6  /  DBili  x   /  AST  50<H>  /  ALT  40  /  AlkPhos  102  11-09    PT/INR - ( 08 Nov 2021 06:26 )   PT: 14.1 sec;   INR: 1.17 ratio                   WBC:  WBC Count: 13.41 K/uL (11-09 @ 05:58)  WBC Count: 7.80 K/uL (11-08 @ 06:26)  WBC Count: 10.59 K/uL (11-07 @ 06:39)  WBC Count: 13.77 K/uL (11-06 @ 23:11)      MICROBIOLOGY:  RECENT CULTURES:  11-08 .Sputum Sputum XXXX   Few polymorphonuclear leukocytes per low power field  No Squamous epithelial cells per low power field  Rare Gram Variable Rods per oil power field XXXX    11-07 .Blood Blood-Peripheral XXXX XXXX   No growth to date.                PT/INR - ( 08 Nov 2021 06:26 )   PT: 14.1 sec;   INR: 1.17 ratio             Sodium:  Sodium, Serum: 140 mmol/L (11-09 @ 05:58)  Sodium, Serum: 146 mmol/L (11-08 @ 16:38)  Sodium, Serum: 146 mmol/L (11-08 @ 06:26)  Sodium, Serum: 140 mmol/L (11-07 @ 11:37)  Sodium, Serum: 139 mmol/L (11-07 @ 06:39)  Sodium, Serum: 139 mmol/L (11-06 @ 23:11)      1.36 mg/dL 11-09 @ 05:58  1.35 mg/dL 11-08 @ 16:38  1.54 mg/dL 11-08 @ 06:26  1.68 mg/dL 11-07 @ 11:37  1.85 mg/dL 11-07 @ 06:39  2.45 mg/dL 11-06 @ 23:11      Hemoglobin:  Hemoglobin: 7.8 g/dL (11-09 @ 05:58)  Hemoglobin: 7.6 g/dL (11-08 @ 06:26)  Hemoglobin: 8.8 g/dL (11-07 @ 06:39)  Hemoglobin: 9.6 g/dL (11-06 @ 23:11)      Platelets: Platelet Count - Automated: 228 K/uL (11-09 @ 05:58)  Platelet Count - Automated: 180 K/uL (11-08 @ 06:26)  Platelet Count - Automated: 202 K/uL (11-07 @ 06:39)  Platelet Count - Automated: 238 K/uL (11-06 @ 23:11)      LIVER FUNCTIONS - ( 09 Nov 2021 05:58 )  Alb: 2.2 g/dL / Pro: 7.2 g/dL / ALK PHOS: 102 U/L / ALT: 40 U/L DA / AST: 50 U/L / GGT: x                 RADIOLOGY & ADDITIONAL STUDIES:      MICROBIOLOGY:  RECENT CULTURES:  11-08 .Sputum Sputum XXXX   Few polymorphonuclear leukocytes per low power field  No Squamous epithelial cells per low power field  Rare Gram Variable Rods per oil power field XXXX    11-07 .Blood Blood-Peripheral XXXX XXXX   No growth to date.

## 2021-11-10 LAB
-  AMIKACIN: SIGNIFICANT CHANGE UP
-  AZTREONAM: SIGNIFICANT CHANGE UP
-  CEFEPIME: SIGNIFICANT CHANGE UP
-  CEFTAZIDIME: SIGNIFICANT CHANGE UP
-  CIPROFLOXACIN: SIGNIFICANT CHANGE UP
-  GENTAMICIN: SIGNIFICANT CHANGE UP
-  IMIPENEM: SIGNIFICANT CHANGE UP
-  LEVOFLOXACIN: SIGNIFICANT CHANGE UP
-  MEROPENEM: SIGNIFICANT CHANGE UP
-  PIPERACILLIN/TAZOBACTAM: SIGNIFICANT CHANGE UP
-  TOBRAMYCIN: SIGNIFICANT CHANGE UP
ALBUMIN SERPL ELPH-MCNC: 1.7 G/DL — LOW (ref 3.3–5)
ALP SERPL-CCNC: 75 U/L — SIGNIFICANT CHANGE UP (ref 30–120)
ALT FLD-CCNC: 28 U/L DA — SIGNIFICANT CHANGE UP (ref 10–60)
ANION GAP SERPL CALC-SCNC: 9 MMOL/L — SIGNIFICANT CHANGE UP (ref 5–17)
AST SERPL-CCNC: 27 U/L — SIGNIFICANT CHANGE UP (ref 10–40)
BASOPHILS # BLD AUTO: 0.01 K/UL — SIGNIFICANT CHANGE UP (ref 0–0.2)
BASOPHILS NFR BLD AUTO: 0.2 % — SIGNIFICANT CHANGE UP (ref 0–2)
BILIRUB SERPL-MCNC: 0.3 MG/DL — SIGNIFICANT CHANGE UP (ref 0.2–1.2)
BLD GP AB SCN SERPL QL: SIGNIFICANT CHANGE UP
BUN SERPL-MCNC: 37 MG/DL — HIGH (ref 7–23)
CALCIUM SERPL-MCNC: 7.7 MG/DL — LOW (ref 8.4–10.5)
CHLORIDE SERPL-SCNC: 105 MMOL/L — SIGNIFICANT CHANGE UP (ref 96–108)
CO2 SERPL-SCNC: 27 MMOL/L — SIGNIFICANT CHANGE UP (ref 22–31)
CREAT SERPL-MCNC: 0.99 MG/DL — SIGNIFICANT CHANGE UP (ref 0.5–1.3)
CULTURE RESULTS: SIGNIFICANT CHANGE UP
CULTURE RESULTS: SIGNIFICANT CHANGE UP
EOSINOPHIL # BLD AUTO: 0.23 K/UL — SIGNIFICANT CHANGE UP (ref 0–0.5)
EOSINOPHIL NFR BLD AUTO: 4.2 % — SIGNIFICANT CHANGE UP (ref 0–6)
GLUCOSE SERPL-MCNC: 119 MG/DL — HIGH (ref 70–99)
HCT VFR BLD CALC: 22.6 % — LOW (ref 34.5–45)
HCT VFR BLD CALC: 26.4 % — LOW (ref 34.5–45)
HGB BLD-MCNC: 6.9 G/DL — CRITICAL LOW (ref 11.5–15.5)
HGB BLD-MCNC: 7.8 G/DL — LOW (ref 11.5–15.5)
IMM GRANULOCYTES NFR BLD AUTO: 1.1 % — SIGNIFICANT CHANGE UP (ref 0–1.5)
LYMPHOCYTES # BLD AUTO: 1.23 K/UL — SIGNIFICANT CHANGE UP (ref 1–3.3)
LYMPHOCYTES # BLD AUTO: 22.4 % — SIGNIFICANT CHANGE UP (ref 13–44)
MAGNESIUM SERPL-MCNC: 2.3 MG/DL — SIGNIFICANT CHANGE UP (ref 1.6–2.6)
MCHC RBC-ENTMCNC: 26.6 PG — LOW (ref 27–34)
MCHC RBC-ENTMCNC: 30.5 GM/DL — LOW (ref 32–36)
MCV RBC AUTO: 87.3 FL — SIGNIFICANT CHANGE UP (ref 80–100)
METHOD TYPE: SIGNIFICANT CHANGE UP
MONOCYTES # BLD AUTO: 0.41 K/UL — SIGNIFICANT CHANGE UP (ref 0–0.9)
MONOCYTES NFR BLD AUTO: 7.5 % — SIGNIFICANT CHANGE UP (ref 2–14)
NEUTROPHILS # BLD AUTO: 3.56 K/UL — SIGNIFICANT CHANGE UP (ref 1.8–7.4)
NEUTROPHILS NFR BLD AUTO: 64.6 % — SIGNIFICANT CHANGE UP (ref 43–77)
NRBC # BLD: 0 /100 WBCS — SIGNIFICANT CHANGE UP (ref 0–0)
ORGANISM # SPEC MICROSCOPIC CNT: SIGNIFICANT CHANGE UP
ORGANISM # SPEC MICROSCOPIC CNT: SIGNIFICANT CHANGE UP
PHOSPHATE SERPL-MCNC: 1.5 MG/DL — LOW (ref 2.5–4.5)
PLATELET # BLD AUTO: 165 K/UL — SIGNIFICANT CHANGE UP (ref 150–400)
POTASSIUM SERPL-MCNC: 4.3 MMOL/L — SIGNIFICANT CHANGE UP (ref 3.5–5.3)
POTASSIUM SERPL-SCNC: 4.3 MMOL/L — SIGNIFICANT CHANGE UP (ref 3.5–5.3)
PROT SERPL-MCNC: 5.3 G/DL — LOW (ref 6–8.3)
RBC # BLD: 2.59 M/UL — LOW (ref 3.8–5.2)
RBC # FLD: 16.2 % — HIGH (ref 10.3–14.5)
SARS-COV-2 RNA SPEC QL NAA+PROBE: SIGNIFICANT CHANGE UP
SODIUM SERPL-SCNC: 141 MMOL/L — SIGNIFICANT CHANGE UP (ref 135–145)
SPECIMEN SOURCE: SIGNIFICANT CHANGE UP
SPECIMEN SOURCE: SIGNIFICANT CHANGE UP
WBC # BLD: 5.5 K/UL — SIGNIFICANT CHANGE UP (ref 3.8–10.5)
WBC # FLD AUTO: 5.5 K/UL — SIGNIFICANT CHANGE UP (ref 3.8–10.5)

## 2021-11-10 PROCEDURE — 99233 SBSQ HOSP IP/OBS HIGH 50: CPT

## 2021-11-10 RX ORDER — SODIUM CHLORIDE 9 MG/ML
500 INJECTION, SOLUTION INTRAVENOUS ONCE
Refills: 0 | Status: COMPLETED | OUTPATIENT
Start: 2021-11-10 | End: 2021-11-10

## 2021-11-10 RX ORDER — SODIUM,POTASSIUM PHOSPHATES 278-250MG
1 POWDER IN PACKET (EA) ORAL EVERY 4 HOURS
Refills: 0 | Status: COMPLETED | OUTPATIENT
Start: 2021-11-10 | End: 2021-11-10

## 2021-11-10 RX ORDER — SODIUM CHLORIDE 9 MG/ML
500 INJECTION, SOLUTION INTRAVENOUS
Refills: 0 | Status: DISCONTINUED | OUTPATIENT
Start: 2021-11-10 | End: 2021-11-11

## 2021-11-10 RX ADMIN — Medication 2 MILLIGRAM(S): at 01:13

## 2021-11-10 RX ADMIN — Medication 81 MILLIGRAM(S): at 11:05

## 2021-11-10 RX ADMIN — ALBUTEROL 2 PUFF(S): 90 AEROSOL, METERED ORAL at 13:41

## 2021-11-10 RX ADMIN — Medication 1 MILLIGRAM(S): at 22:25

## 2021-11-10 RX ADMIN — SIMETHICONE 80 MILLIGRAM(S): 80 TABLET, CHEWABLE ORAL at 05:58

## 2021-11-10 RX ADMIN — Medication 1 MILLIGRAM(S): at 05:58

## 2021-11-10 RX ADMIN — SENNA PLUS 2 TABLET(S): 8.6 TABLET ORAL at 22:00

## 2021-11-10 RX ADMIN — Medication 1 GRAM(S): at 17:25

## 2021-11-10 RX ADMIN — SODIUM CHLORIDE 50 MILLILITER(S): 9 INJECTION, SOLUTION INTRAVENOUS at 15:45

## 2021-11-10 RX ADMIN — Medication 1 GRAM(S): at 05:58

## 2021-11-10 RX ADMIN — CHLORHEXIDINE GLUCONATE 1 APPLICATION(S): 213 SOLUTION TOPICAL at 11:06

## 2021-11-10 RX ADMIN — HEPARIN SODIUM 5000 UNIT(S): 5000 INJECTION INTRAVENOUS; SUBCUTANEOUS at 05:58

## 2021-11-10 RX ADMIN — SIMETHICONE 80 MILLIGRAM(S): 80 TABLET, CHEWABLE ORAL at 17:25

## 2021-11-10 RX ADMIN — Medication 1 MILLIGRAM(S): at 15:43

## 2021-11-10 RX ADMIN — HEPARIN SODIUM 5000 UNIT(S): 5000 INJECTION INTRAVENOUS; SUBCUTANEOUS at 21:59

## 2021-11-10 RX ADMIN — METHOCARBAMOL 250 MILLIGRAM(S): 500 TABLET, FILM COATED ORAL at 17:25

## 2021-11-10 RX ADMIN — ALBUTEROL 2 PUFF(S): 90 AEROSOL, METERED ORAL at 19:40

## 2021-11-10 RX ADMIN — Medication 1 PACKET(S): at 15:45

## 2021-11-10 RX ADMIN — Medication 1 TABLET(S): at 11:05

## 2021-11-10 RX ADMIN — Medication 1: at 11:05

## 2021-11-10 RX ADMIN — SIMETHICONE 80 MILLIGRAM(S): 80 TABLET, CHEWABLE ORAL at 11:05

## 2021-11-10 RX ADMIN — SODIUM CHLORIDE 3000 MILLILITER(S): 9 INJECTION, SOLUTION INTRAVENOUS at 04:14

## 2021-11-10 RX ADMIN — Medication 1 GRAM(S): at 11:06

## 2021-11-10 RX ADMIN — Medication 1: at 22:02

## 2021-11-10 RX ADMIN — PANTOPRAZOLE SODIUM 40 MILLIGRAM(S): 20 TABLET, DELAYED RELEASE ORAL at 11:05

## 2021-11-10 RX ADMIN — HEPARIN SODIUM 5000 UNIT(S): 5000 INJECTION INTRAVENOUS; SUBCUTANEOUS at 14:43

## 2021-11-10 RX ADMIN — Medication 1 PACKET(S): at 10:39

## 2021-11-10 RX ADMIN — POLYETHYLENE GLYCOL 3350 17 GRAM(S): 17 POWDER, FOR SOLUTION ORAL at 05:58

## 2021-11-10 RX ADMIN — ALBUTEROL 2 PUFF(S): 90 AEROSOL, METERED ORAL at 00:20

## 2021-11-10 RX ADMIN — ALBUTEROL 2 PUFF(S): 90 AEROSOL, METERED ORAL at 06:59

## 2021-11-10 RX ADMIN — METHOCARBAMOL 250 MILLIGRAM(S): 500 TABLET, FILM COATED ORAL at 05:58

## 2021-11-10 RX ADMIN — Medication 1: at 17:25

## 2021-11-10 NOTE — PROGRESS NOTE ADULT - SUBJECTIVE AND OBJECTIVE BOX
[facility] DIVISION of INFECTIOUS DISEASE  Arturo Olivas MD PhD, Haylee Umanzor MD, Andressa Brantley MD, Billy Valenzuela MD, Emil Medellin MD  and providing coverage with Alexa Canas MD and Eusebio Chairez MD  Providing Infectious Disease Consultations at [facility]    Office# [phone] to schedule follow up appointments  Answering Service for urgent calls or New Consults [phone]  Cell# to text for urgent issues Arturo Olivas [phone]     infectious diseases progress note:    BREA BECKHAM is a 75y y. o. Female patient    No concerning overnight events    Allergies    codeine (Hives)    Intolerances        ANTIBIOTICS/RELEVANT:  antimicrobials    immunologic:    OTHER:  acetaminophen     Tablet .. 650 milliGRAM(s) Oral every 6 hours PRN  acetaminophen    Suspension .. 650 milliGRAM(s) Oral every 6 hours PRN  ALBUTerol    90 MICROgram(s) HFA Inhaler 2 Puff(s) Inhalation every 6 hours  aspirin  chewable 81 milliGRAM(s) Oral daily  chlorhexidine 2% Cloths 1 Application(s) Topical daily  dextrose 40% Gel 15 Gram(s) Oral once  dextrose 5%. 1000 milliLiter(s) IV Continuous <Continuous>  dextrose 5%. 1000 milliLiter(s) IV Continuous <Continuous>  dextrose 50% Injectable 25 Gram(s) IV Push once  dextrose 50% Injectable 12.5 Gram(s) IV Push once  dextrose 50% Injectable 25 Gram(s) IV Push once  glucagon  Injectable 1 milliGRAM(s) IntraMuscular once  heparin   Injectable 5000 Unit(s) SubCutaneous every 8 hours  insulin lispro (ADMELOG) corrective regimen sliding scale   SubCutaneous every 6 hours  lactobacillus acidophilus 1 Tablet(s) Oral daily  LORazepam     Tablet 1 milliGRAM(s) Oral three times a day  LORazepam   Injectable 2 milliGRAM(s) IV Push every 2 hours PRN  LORazepam   Injectable 2 milliGRAM(s) IV Push once  methocarbamol 250 milliGRAM(s) Oral two times a day  pantoprazole   Suspension 40 milliGRAM(s) Oral daily  polyethylene glycol 3350 17 Gram(s) Oral every 12 hours  potassium phosphate / sodium phosphate Powder (PHOS-NaK) 1 Packet(s) Oral every 4 hours  senna 2 Tablet(s) Oral at bedtime  simethicone 80 milliGRAM(s) Chew every 6 hours  sucralfate suspension 1 Gram(s) Oral four times a day      Objective:  Vital Signs Last 24 Hrs  T(C): 36.9 (10 Nov 2021 08:00), Max: 37 (09 Nov 2021 21:23)  T(F): 98.4 (10 Nov 2021 08:00), Max: 98.6 (09 Nov 2021 21:23)  HR: 69 (10 Nov 2021 08:00) (58 - 86)  BP: 104/55 (10 Nov 2021 08:00) (81/63 - 121/62)  BP(mean): 68 (10 Nov 2021 08:00) (66 - 91)  RR: 19 (10 Nov 2021 08:00) (14 - 33)  SpO2: 100% (10 Nov 2021 08:00) (98% - 100%)    T(C): 36.9 (11-10-21 @ 08:00), Max: 38.3 (11-08-21 @ 10:44)  T(C): 36.9 (11-10-21 @ 08:00), Max: 38.7 (11-08-21 @ 09:09)  T(C): 36.9 (11-10-21 @ 08:00), Max: 38.7 (11-08-21 @ 09:09)    PHYSICAL EXAM:  HEENT: NC atraumatic  Neck: supple, INTUBATED  Respiratory: no accessory muscle use, breathing comfortably  Cardiovascular: distant  Gastrointestinal: normal appearing, nondistended  Extremities: no clubbing, no cyanosis,    Mode: PRVC, RR (machine): 18, TV (machine): 400, FiO2: 40, PEEP: 5, ITime: 1, MAP: 12, PIP: 30    LABS:                          7.8    x     )-----------( x        ( 10 Nov 2021 08:07 )             26.4       5.50 11-10 @ 06:32  13.41 11-09 @ 05:58  7.80 11-08 @ 06:26  10.59 11-07 @ 06:39  13.77 11-06 @ 23:11      11-10    141  |  105  |  37<H>  ----------------------------<  119<H>  4.3   |  27  |  0.99    Ca    7.7<L>      10 Nov 2021 06:32  Phos  1.5     11-10  Mg     2.3     11-10    TPro  5.3<L>  /  Alb  1.7<L>  /  TBili  0.3  /  DBili  x   /  AST  27  /  ALT  28  /  AlkPhos  75  11-10      Creatinine, Serum: 0.99 mg/dL (11-10-21 @ 06:32)  Creatinine, Serum: 1.36 mg/dL (11-09-21 @ 05:58)  Creatinine, Serum: 1.35 mg/dL (11-08-21 @ 16:38)  Creatinine, Serum: 1.54 mg/dL (11-08-21 @ 06:26)  Creatinine, Serum: 1.68 mg/dL (11-07-21 @ 11:37)  Creatinine, Serum: 1.85 mg/dL (11-07-21 @ 06:39)  Creatinine, Serum: 2.45 mg/dL (11-06-21 @ 23:11)                INFLAMMATORY MARKERS  Auto Neutrophil #: 3.56 K/uL (11-10-21 @ 06:32)  Auto Lymphocyte #: 1.23 K/uL (11-10-21 @ 06:32)  Auto Neutrophil #: 6.05 K/uL (11-08-21 @ 06:26)  Auto Lymphocyte #: 1.14 K/uL (11-08-21 @ 06:26)  Auto Lymphocyte #: 2.16 K/uL (11-07-21 @ 06:39)  Auto Neutrophil #: 7.77 K/uL (11-07-21 @ 06:39)  Auto Lymphocyte #: 2.19 K/uL (11-06-21 @ 23:11)  Auto Neutrophil #: 10.60 K/uL (11-06-21 @ 23:11)    Lactate, Blood: 1.6 mmol/L (11-07-21 @ 01:45)  Lactate, Blood: 3.3 mmol/L (11-06-21 @ 23:11)    Auto Eosinophil #: 0.23 K/uL (11-10-21 @ 06:32)  Auto Eosinophil #: 0.07 K/uL (11-08-21 @ 06:26)  Auto Eosinophil #: 0.09 K/uL (11-07-21 @ 06:39)  Auto Eosinophil #: 0.09 K/uL (11-06-21 @ 23:11)        Procalcitonin, Serum: 17.60 ng/mL (11-08-21 @ 12:45)                  INR: 1.17 ratio (11-08-21 @ 06:26)  Activated Partial Thromboplastin Time: 42.3 sec (11-06-21 @ 23:11)  INR: 1.16 ratio (11-06-21 @ 23:11)          MICROBIOLOGY:              RADIOLOGY & ADDITIONAL STUDIES:

## 2021-11-10 NOTE — PROVIDER CONTACT NOTE (CRITICAL VALUE NOTIFICATION) - ACTION/TREATMENT ORDERED:
treatment in progress
Dr Hooker aware, repeat CBC drawn and sent to lab, will follow up with results.

## 2021-11-10 NOTE — PROGRESS NOTE ADULT - SUBJECTIVE AND OBJECTIVE BOX
[facility]    Allergies    codeine (Hives)    Intolerances        PAST MEDICAL & SURGICAL HISTORY:  Dementia of frontal lobe type    Aphasic stroke    Diabetes mellitus    Respiratory failure    Hypertension    GERD (gastroesophageal reflux disease)    Constipation    Respiratory failure    CVA (cerebral vascular accident)    HTN (hypertension)    DM (diabetes mellitus)    Advanced dementia    COVID-19 virus detected    Quadriplegia    Pneumonia    Type II diabetes mellitus    Hx of appendectomy    Gastrostomy in place    Tracheostomy in place    Tracheostomy tube present    Feeding by G-tube        FAMILY HISTORY:      Home Medications:  acetaminophen 160 mg/5 mL oral suspension: 20.31 milliliter(s) by gastrostomy tube every 6 hours, As Needed (07 Nov 2021 00:03)  albuterol 90 mcg/inh inhalation aerosol: 2 puff(s) inhaled every 6 hours (07 Nov 2021 00:03)  aspirin 81 mg oral tablet, chewable: 1 tab(s) by PEG tube once a day (07 Nov 2021 00:03)  Bacid (LAC) oral tablet: 2 tab(s) by gastrostomy tube once a day (07 Nov 2021 00:03)  Carafate 1 g/10 mL oral suspension: 10 milliliter(s) by gastrostomy tube 4 times a day (before meals and at bedtime) for 14 days (Started 6/4/21) (07 Nov 2021 00:03)  chlorhexidine 0.12% mucous membrane liquid: 15 milliliter(s) mucous membrane 2 times a day (07 Nov 2021 00:03)  insulin glargine: 36 unit(s) subcutaneous once a day (at bedtime) (07 Nov 2021 00:03)  insulin lispro 100 units/mL injectable solution:  injectable; sliding scale coverage  (07 Nov 2021 00:03)  ipratropium-albuterol 0.5 mg-2.5 mg/3 mL inhalation solution: 3 milliliter(s) inhaled 4 times a day (07 Nov 2021 00:03)  LORazepam 1 mg oral tablet: 1 tab(s) by gastrostomy tube 3 times a day, As Needed (07 Nov 2021 01:48)  methocarbamol: 250 milligram(s) by gastrostomy tube 2 times a day (07 Nov 2021 00:03)  pantoprazole 40 mg oral granule, delayed release: 40 milligram(s) by gastrostomy tube 2 times a day (07 Nov 2021 00:03)  polyethylene glycol 3350 oral powder for reconstitution: 17 gram(s) orally every 12 hours (07 Nov 2021 00:03)  senna 8.6 mg oral tablet: 3 tab(s) by gastrostomy tube once a day (at bedtime) (07 Nov 2021 01:48)  simethicone 80 mg oral tablet, chewable: 1 tab(s) orally every 6 hours (07 Nov 2021 00:03)  Tylenol 325 mg oral tablet: 2 tab(s) by gastrostomy tube once a day; 60 minutes prior to dressing change  (07 Nov 2021 01:48)      MEDICATIONS  (STANDING):  ALBUTerol    90 MICROgram(s) HFA Inhaler 2 Puff(s) Inhalation every 6 hours  aspirin  chewable 81 milliGRAM(s) Oral daily  chlorhexidine 2% Cloths 1 Application(s) Topical daily  dextrose 40% Gel 15 Gram(s) Oral once  dextrose 5%. 1000 milliLiter(s) (50 mL/Hr) IV Continuous <Continuous>  dextrose 5%. 1000 milliLiter(s) (100 mL/Hr) IV Continuous <Continuous>  dextrose 50% Injectable 25 Gram(s) IV Push once  dextrose 50% Injectable 12.5 Gram(s) IV Push once  dextrose 50% Injectable 25 Gram(s) IV Push once  glucagon  Injectable 1 milliGRAM(s) IntraMuscular once  heparin   Injectable 5000 Unit(s) SubCutaneous every 8 hours  insulin lispro (ADMELOG) corrective regimen sliding scale   SubCutaneous every 6 hours  lactobacillus acidophilus 1 Tablet(s) Oral daily  LORazepam     Tablet 1 milliGRAM(s) Oral three times a day  LORazepam   Injectable 2 milliGRAM(s) IV Push once  methocarbamol 250 milliGRAM(s) Oral two times a day  pantoprazole   Suspension 40 milliGRAM(s) Oral daily  polyethylene glycol 3350 17 Gram(s) Oral every 12 hours  potassium phosphate / sodium phosphate Powder (PHOS-NaK) 1 Packet(s) Oral every 4 hours  senna 2 Tablet(s) Oral at bedtime  simethicone 80 milliGRAM(s) Chew every 6 hours  sucralfate suspension 1 Gram(s) Oral four times a day    MEDICATIONS  (PRN):  acetaminophen     Tablet .. 650 milliGRAM(s) Oral every 6 hours PRN Mild Pain (1 - 3)  acetaminophen    Suspension .. 650 milliGRAM(s) Oral every 6 hours PRN Temp greater or equal to 38C (100.4F)  LORazepam   Injectable 2 milliGRAM(s) IV Push every 2 hours PRN Aggitation / Vent Compliance / Tachypnea > 30      Diet, NPO:   Tube Feeding Modality: Gastrostomy  Glucerna 1.5 Horacio  Total Volume for 24 Hours (mL): 1200  Continuous  Starting Tube Feed Rate mL per Hour: 50  Until Goal Tube Feed Rate (mL per Hour): 50  Tube Feed Duration (in Hours): 24  Tube Feed Start Time: 16:00  Free Water Flush  Bolus   Total Volume per Flush (mL): 300   Frequency: Every 8 Hours (11-07-21 @ 01:59) [Active]          Vital Signs Last 24 Hrs  T(C): 36.9 (10 Nov 2021 08:00), Max: 37 (09 Nov 2021 21:23)  T(F): 98.4 (10 Nov 2021 08:00), Max: 98.6 (09 Nov 2021 21:23)  HR: 69 (10 Nov 2021 08:00) (58 - 86)  BP: 104/55 (10 Nov 2021 08:00) (81/63 - 121/62)  BP(mean): 68 (10 Nov 2021 08:00) (66 - 91)  RR: 19 (10 Nov 2021 08:00) (14 - 33)  SpO2: 100% (10 Nov 2021 08:00) (98% - 100%)      11-09-21 @ 07:01  -  11-10-21 @ 07:00  --------------------------------------------------------  IN: 3770 mL / OUT: 400 mL / NET: 3370 mL        Mode: PRVC, RR (machine): 18, TV (machine): 400, FiO2: 40, PEEP: 5, ITime: 1, MAP: 12, PIP: 30      LABS:                        7.8    x     )-----------( x        ( 10 Nov 2021 08:07 )             26.4     11-10    141  |  105  |  37<H>  ----------------------------<  119<H>  4.3   |  27  |  0.99    Ca    7.7<L>      10 Nov 2021 06:32  Phos  1.5     11-10  Mg     2.3     11-10    TPro  5.3<L>  /  Alb  1.7<L>  /  TBili  0.3  /  DBili  x   /  AST  27  /  ALT  28  /  AlkPhos  75  11-10              WBC:  WBC Count: 5.50 K/uL (11-10 @ 06:32)  WBC Count: 13.41 K/uL (11-09 @ 05:58)  WBC Count: 7.80 K/uL (11-08 @ 06:26)  WBC Count: 10.59 K/uL (11-07 @ 06:39)  WBC Count: 13.77 K/uL (11-06 @ 23:11)      MICROBIOLOGY:  RECENT CULTURES:  11-08 .Sputum Sputum XXXX   Few polymorphonuclear leukocytes per low power field  No Squamous epithelial cells per low power field  Rare Gram Variable Rods per oil power field   Normal Respiratory Alejandro present    11-07 .Blood Blood-Peripheral XXXX XXXX   No growth to date.    11-07 Clean Catch Clean Catch (Midstream) XXXX XXXX   >100,000 CFU/ml Candida albicans "Susceptibilities not performed"  <10,000 CFU/ml Normal Urogenital alejandro present                    Sodium:  Sodium, Serum: 141 mmol/L (11-10 @ 06:32)  Sodium, Serum: 140 mmol/L (11-09 @ 05:58)  Sodium, Serum: 146 mmol/L (11-08 @ 16:38)  Sodium, Serum: 146 mmol/L (11-08 @ 06:26)  Sodium, Serum: 140 mmol/L (11-07 @ 11:37)  Sodium, Serum: 139 mmol/L (11-07 @ 06:39)  Sodium, Serum: 139 mmol/L (11-06 @ 23:11)      0.99 mg/dL 11-10 @ 06:32  1.36 mg/dL 11-09 @ 05:58  1.35 mg/dL 11-08 @ 16:38  1.54 mg/dL 11-08 @ 06:26  1.68 mg/dL 11-07 @ 11:37  1.85 mg/dL 11-07 @ 06:39  2.45 mg/dL 11-06 @ 23:11      Hemoglobin:  Hemoglobin: 7.8 g/dL (11-10 @ 08:07)  Hemoglobin: 6.9 g/dL (11-10 @ 06:32)  Hemoglobin: 7.8 g/dL (11-09 @ 05:58)  Hemoglobin: 7.6 g/dL (11-08 @ 06:26)  Hemoglobin: 8.8 g/dL (11-07 @ 06:39)  Hemoglobin: 9.6 g/dL (11-06 @ 23:11)      Platelets: Platelet Count - Automated: 165 K/uL (11-10 @ 06:32)  Platelet Count - Automated: 228 K/uL (11-09 @ 05:58)  Platelet Count - Automated: 180 K/uL (11-08 @ 06:26)  Platelet Count - Automated: 202 K/uL (11-07 @ 06:39)  Platelet Count - Automated: 238 K/uL (11-06 @ 23:11)      LIVER FUNCTIONS - ( 10 Nov 2021 06:32 )  Alb: 1.7 g/dL / Pro: 5.3 g/dL / ALK PHOS: 75 U/L / ALT: 28 U/L DA / AST: 27 U/L / GGT: x                 RADIOLOGY & ADDITIONAL STUDIES:      MICROBIOLOGY:  RECENT CULTURES:  11-08 .Sputum Sputum XXXX   Few polymorphonuclear leukocytes per low power field  No Squamous epithelial cells per low power field  Rare Gram Variable Rods per oil power field   Normal Respiratory Alejandro present    11-07 .Blood Blood-Peripheral XXXX XXXX   No growth to date.    11-07 Clean Catch Clean Catch (Midstream) XXXX XXXX   >100,000 CFU/ml Candida albicans "Susceptibilities not performed"  <10,000 CFU/ml Normal Urogenital alejandro present

## 2021-11-10 NOTE — PROGRESS NOTE ADULT - SUBJECTIVE AND OBJECTIVE BOX
Patient is a 75y old  Female who presents with a chief complaint of abnormal labs (10 Nov 2021 09:21)      INTERVAL HPI/OVERNIGHT EVENTS: Patient seen and examined at bedside. No overnight events.      MEDICATIONS  (STANDING):  ALBUTerol    90 MICROgram(s) HFA Inhaler 2 Puff(s) Inhalation every 6 hours  aspirin  chewable 81 milliGRAM(s) Oral daily  chlorhexidine 2% Cloths 1 Application(s) Topical daily  dextrose 40% Gel 15 Gram(s) Oral once  dextrose 5%. 1000 milliLiter(s) (50 mL/Hr) IV Continuous <Continuous>  dextrose 5%. 1000 milliLiter(s) (100 mL/Hr) IV Continuous <Continuous>  dextrose 50% Injectable 25 Gram(s) IV Push once  dextrose 50% Injectable 12.5 Gram(s) IV Push once  dextrose 50% Injectable 25 Gram(s) IV Push once  glucagon  Injectable 1 milliGRAM(s) IntraMuscular once  heparin   Injectable 5000 Unit(s) SubCutaneous every 8 hours  insulin lispro (ADMELOG) corrective regimen sliding scale   SubCutaneous every 6 hours  lactobacillus acidophilus 1 Tablet(s) Oral daily  LORazepam     Tablet 1 milliGRAM(s) Oral three times a day  LORazepam   Injectable 2 milliGRAM(s) IV Push once  methocarbamol 250 milliGRAM(s) Oral two times a day  pantoprazole   Suspension 40 milliGRAM(s) Oral daily  polyethylene glycol 3350 17 Gram(s) Oral every 12 hours  potassium phosphate / sodium phosphate Powder (PHOS-NaK) 1 Packet(s) Oral every 4 hours  senna 2 Tablet(s) Oral at bedtime  simethicone 80 milliGRAM(s) Chew every 6 hours  sucralfate suspension 1 Gram(s) Oral four times a day    MEDICATIONS  (PRN):  acetaminophen     Tablet .. 650 milliGRAM(s) Oral every 6 hours PRN Mild Pain (1 - 3)  acetaminophen    Suspension .. 650 milliGRAM(s) Oral every 6 hours PRN Temp greater or equal to 38C (100.4F)  LORazepam   Injectable 2 milliGRAM(s) IV Push every 2 hours PRN Aggitation / Vent Compliance / Tachypnea > 30      Allergies    codeine (Hives)    Intolerances        REVIEW OF SYSTEMS:  Unable to obtain meaningful ROS due to mental status    Vital Signs Last 24 Hrs  T(C): 36.9 (10 Nov 2021 08:00), Max: 37 (09 Nov 2021 21:23)  T(F): 98.4 (10 Nov 2021 08:00), Max: 98.6 (09 Nov 2021 21:23)  HR: 70 (10 Nov 2021 10:22) (58 - 86)  BP: 104/55 (10 Nov 2021 08:00) (81/63 - 121/62)  BP(mean): 68 (10 Nov 2021 08:00) (66 - 91)  RR: 19 (10 Nov 2021 08:00) (14 - 33)  SpO2: 98% (10 Nov 2021 10:22) (98% - 100%)      PHYSICAL EXAM:  GENERAL: chronically ill appearing, emaciated, NAD, obtunded  HEAD:  atraumatic  EYES: conjunctiva clear  NECK: +trach in place, clean  RESPIRATORY:   coarse mechanical breath sounds, no gross crackles or rales  CARDIOVASCULAR:  regular rate and rhythm, no murmurs or rubs or gallops, 2+ peripheral pulses  GASTROINTESTINAL:   soft, +PEG in place, clean and dry as well, nondistended, no hepatosplenomegaly palpated, bowel sounds present  EXTREMITIES:     no clubbing or cyanosis or edema  MUSCULOSKELETAL:     +diffuse contractures in all joints  NERVOUS SYSTEM: does not respond to pain, moves extremities at times per RN  SKIN:     necrotic tips of bilateral 1st toes    LABS:                        7.8    x     )-----------( x        ( 10 Nov 2021 08:07 )             26.4     CBC Full  -  ( 10 Nov 2021 08:07 )  WBC Count : x  Hemoglobin : 7.8 g/dL  Hematocrit : 26.4 %  Platelet Count - Automated : x  Mean Cell Volume : x  Mean Cell Hemoglobin : x  Mean Cell Hemoglobin Concentration : x  Auto Neutrophil # : x  Auto Lymphocyte # : x  Auto Monocyte # : x  Auto Eosinophil # : x  Auto Basophil # : x  Auto Neutrophil % : x  Auto Lymphocyte % : x  Auto Monocyte % : x  Auto Eosinophil % : x  Auto Basophil % : x    10 Nov 2021 06:32    141    |  105    |  37     ----------------------------<  119    4.3     |  27     |  0.99     Ca    7.7        10 Nov 2021 06:32  Phos  1.5       10 Nov 2021 06:32  Mg     2.3       10 Nov 2021 06:32    TPro  5.3    /  Alb  1.7    /  TBili  0.3    /  DBili  x      /  AST  27     /  ALT  28     /  AlkPhos  75     10 Nov 2021 06:32        CAPILLARY BLOOD GLUCOSE      POCT Blood Glucose.: 173 mg/dL (10 Nov 2021 11:02)  POCT Blood Glucose.: 121 mg/dL (10 Nov 2021 06:00)  POCT Blood Glucose.: 183 mg/dL (09 Nov 2021 23:56)  POCT Blood Glucose.: 169 mg/dL (09 Nov 2021 18:28)  POCT Blood Glucose.: 171 mg/dL (09 Nov 2021 11:59)        Culture - Sputum (collected 11-08-21 @ 13:11)  Source: .Sputum Sputum  Gram Stain (11-08-21 @ 20:51):    Few polymorphonuclear leukocytes per low power field    No Squamous epithelial cells per low power field    Rare Gram Variable Rods per oil power field  Preliminary Report (11-09-21 @ 20:01):    Normal Respiratory Alejandro present    Culture - Blood (collected 11-07-21 @ 12:02)  Source: .Blood Blood-Peripheral  Preliminary Report (11-08-21 @ 13:01):    No growth to date.    Culture - Blood (collected 11-07-21 @ 12:02)  Source: .Blood Blood-Peripheral  Preliminary Report (11-08-21 @ 13:01):    No growth to date.    Culture - Urine (collected 11-07-21 @ 11:33)  Source: Clean Catch Clean Catch (Midstream)  Final Report (11-10-21 @ 06:13):    >100,000 CFU/ml Candida albicans "Susceptibilities not performed"    <10,000 CFU/ml Normal Urogenital alejandro present        RADIOLOGY & ADDITIONAL TESTS: < from: US Kidney and Bladder (11.07.21 @ 14:18) >  EXAM:  US KIDNEYS AND BLADDER                                  PROCEDURE DATE:  11/07/2021          INTERPRETATION:  CLINICAL INFORMATION: Acute kidney injury. Evaluate for hydronephrosis.    COMPARISON: Correlation is made abdominal and pelvic CT dated 11/7/2021 at 1:00 AM.    TECHNIQUE: Sonography of the kidneys and bladder. Study is technically limited.    FINDINGS:    Right kidney: 9.8 cm. No hydronephrosis.    Left kidney: 10.1 cm. No hydronephrosis. Mildly increased cortical echogenicity.    Urinary bladder: Urinary bladder is moderately distended with estimated volume of 220 cc.    IMPRESSION:    No hydronephrosis.        --- End of Report ---              LATA GARIBAY MD; Attending Radiologist  This document has been electronically signed. Nov 7 2021  3:13PM    < end of copied text >    < from: CT Chest No Cont (11.07.21 @ 02:52) >    EXAM:  CT CHEST                                  PROCEDURE DATE:  11/07/2021          INTERPRETATION:  CLINICAL INFORMATION: Elevated white count. Acute kidney failure    COMPARISON: CT abdomen pelvis 11/7/2021. CT chest abdomen and pelvis 9/25/2021.    CONTRAST/COMPLICATIONS:  IV Contrast: NONE  Oral Contrast: NONE  Complications: None reported at time of study completion    PROCEDURE:  CT of the Chest was performed.  Sagittal and coronal reformats were performed.    FINDINGS:    LUNGS AND AIRWAYS: Tracheostomy tube in place. Some layering fluid within the trachea. Patent central airways.  Bibasilar atelectasis. Calcific density material in the right lower lobe since the previous chest CT possibly representing aspirated material.  Previously seen bilateral patchy airspace opacities in a perihilar distribution have almost completely resolved since the previous chest CT.  PLEURA: Small left effusion with associated atelectasis not significantly changed. Trace right pleural effusion..  MEDIASTINUM AND JHONNY: Enlarged mediastinal lymph nodes are unchanged since the previous chest CT. Air dilated esophagus, unchanged.  VESSELS: Within normal limits.  HEART: Heart size is normal. No pericardial effusion.  CHEST WALL AND LOWER NECK: Withinnormal limits.  VISUALIZED UPPER ABDOMEN: Within normal limits.  BONES: Degenerative changes.    IMPRESSION:    Bilateral effusions and bibasilar atelectasis not significantly changed. Calcific density within the right lower lobe is new since the previous chest CT, and may represent aspirated material. Developing aspiration pneumonia cannot be excluded. Correlate clinically.    Previously seen bilateral patchy airspace opacities in a perihilar distribution have almost completely resolved since the previous chest CT.      --- End of Report ---              JOSHUA MERCADO MD; Attending Radiologist  This document has been electronically signed. Nov 7 2021  4:25AM    < end of copied text >    < from: CT Renal Stone Hunt (11.07.21 @ 01:08) >    EXAM:  CT RENAL STONE HUNT                                  PROCEDURE DATE:  11/07/2021          INTERPRETATION:  CLINICAL INFORMATION: Renal insufficiency. Evaluate for hydronephrosis    COMPARISON: CT chest abdomen and pelvis 9/25/2021.    CONTRAST/COMPLICATIONS:  IV Contrast: NONE  Oral Contrast: NONE  Complications: None reported at time of study completion    PROCEDURE:  CT of the Abdomen and Pelvis was performed.  Sagittal and coronal reformats were performed.    FINDINGS:  LOWER CHEST: Small bilateral pleural effusions and associated atelectasis. Calcific density within the right lung may represent aspirated material. This is new since the previous exam.    LIVER: Within normal limits.  BILE DUCTS: Normal caliber.  GALLBLADDER: Within normal limits.  SPLEEN: Within normal limits.  PANCREAS: Within normal limits.  ADRENALS: Within normal limits.  KIDNEYS/URETERS: Within normal limits.    BLADDER: The bladder is collapsed around a Woody catheter limiting evaluation..  REPRODUCTIVE ORGANS: Uterus and adnexa within normal limits.    BOWEL: Gastrostomy tube. The rectum is mildly distended with fluid similar to the previous exam. No bowel obstruction. Appendix  PERITONEUM: No ascites.  VESSELS: Within normal limits.  RETROPERITONEUM/LYMPH NODES: No lymphadenopathy.  ABDOMINAL WALL: Within normal limits.  BONES: Degenerative changes. Osteopenia. Posttraumatic changes to the left femur are stable.    IMPRESSION:    No hydronephrosis as clinically questioned.      --- End of Report ---              JOSHUA MERCADO MD; Attending Radiologist  This document has been electronically signed. Nov 7 2021  1:36AM    < end of copied text >      Consultant(s) Notes Reviewed:  [x] YES  [ ] NO    Care Discussed with [x] Consultants  [x] Patient  [ ] Family  [ ]      [ x] Other; RN  DVT ppx

## 2021-11-10 NOTE — PROGRESS NOTE ADULT - SUBJECTIVE AND OBJECTIVE BOX
Chief Complaint: Abnormal labs    Interval Events: No events overnight.    Review of Systems:  Unable to obtain    Physical Exam:  Vital Signs Last 24 Hrs  T(C): 36.9 (10 Nov 2021 08:00), Max: 37 (09 Nov 2021 21:23)  T(F): 98.4 (10 Nov 2021 08:00), Max: 98.6 (09 Nov 2021 21:23)  HR: 69 (10 Nov 2021 08:00) (58 - 86)  BP: 104/55 (10 Nov 2021 08:00) (81/63 - 121/62)  BP(mean): 68 (10 Nov 2021 08:00) (66 - 91)  RR: 19 (10 Nov 2021 08:00) (14 - 33)  SpO2: 100% (10 Nov 2021 08:00) (98% - 100%)  General: NAD  HEENT: Trach  Neck: No JVD, no carotid bruit  Lungs: CTAB  CV: RRR, nl S1/S2, no M/R/G  Abdomen: S/NT/ND, +BS  Extremities: No LE edema, no cyanosis  Neuro: AAOx0  Skin: No rash    Labs:    11-10    141  |  105  |  37<H>  ----------------------------<  119<H>  4.3   |  27  |  0.99    Ca    7.7<L>      10 Nov 2021 06:32  Phos  1.5     11-10  Mg     2.3     11-10    TPro  5.3<L>  /  Alb  1.7<L>  /  TBili  0.3  /  DBili  x   /  AST  27  /  ALT  28  /  AlkPhos  75  11-10                        7.8    x     )-----------( x        ( 10 Nov 2021 08:07 )             26.4       Telemetry: Sinus rhythm

## 2021-11-10 NOTE — CHART NOTE - NSCHARTNOTEFT_GEN_A_CORE
Assessment:     Pt seen for nutrition follow-up. Pt is a 74 y/o F w/ chronic resp failure- vent dependent, hx of subarachnoid hemorrhage, hx of cardiac arrest, dementia s/p PEG, HTN, DM2 on insulin, hx of CVA, GERD, COPD, admission here from 9/25 to 10/4 for respiratory failure/sepsis possibly 2/2 aspiration vs vent associated PNA who presents from [facility] for abnormal labs. Unclear as to the reason why labs were done yesterday but patient was found to have an elevated BUN of 42 and then on repeat BUN of 100. Was sent here for further evaluation. In the ED, patient's initial triage vitals were BP 97/60, HR 72, RR 18, 100% on vent and T98.5F. Labs showed leukocytosis of 13.7, BUN/Cr 104/2.45, lactate 3.3. Patient given 1.8L of NS. Patient's CT A/P showed no hydronephrosis but showed a calcific density within the right lung which may represent aspirated material (new from previous exam). Started on vancomycin and zosyn empirically for PNA. Patient's UA also showed many yeast and was started on fluconazole empirically as well. Patient's repeat lactate trended down to 1.6 on repeat. Patient RVP and COVID negative    Pt currently NPO receiving continuous EN feeds of Glucerna 1.5 via PEG at goal rate of 50ml/hr x 20 hrs and free water flushes of 300ml every 8 hours. This is providing pt w/ 1000 ml formula/day, 1500 kcals (based on 26 kcal/kg BW) and 82.5g protein (based on 1.4g pro/kg BW). Formula providing 760 ml water + free water flushes (900 ml) providing 1,660 ml total, which meets pt's estimated fluids needs. Pt currently tolerating tube feedings well. Pt admitted from [facility] facility where pt was receiving EN feeds via PEG of Glucerna 1.5 @ 50 ml/hr x 20hr for a total volume of 1000 ml/day, providing pt w/ 1500kcal and 82.5g protein per day (+addtl 300ml before and after, +300ml at 2am= 900ml fluid/day in addition to tube feeding hourly flush). No food allergies per chart review. Per previous RD note from previous admission on 9/8/21, "bed scale weight of 114.8#; adm weight from \[facility]\"" June 2021: 116#; transfer weight 120#; no significant wt changes recently per spouse". CBW on admission 124#. No edema noted. + BM today (11/10) loose stool noted per flowsheet. Pt w/ increased energy/protein needs. Pt admitted w/ wounds; L/R 1st toe, and pressure injuries; DTI to Rt 4th and 5th toe, stage I to BL heels. Recommend addition of no carb prosource BID to provide additional 120kcals, 30g protein/day for a total of 1620kcals and 112.5g protein. Recommend MVI and vitamin C supplementation as well to facilitate wound healing. RD to follow-up and will continue to monitor pt's nutrition status.    Factors impacting intake: [ ] none [ ] nausea  [ ] vomiting [ ] diarrhea [ ] constipation  [ ]chewing problems [ ] swallowing issues  [X] other: NPO w/ TF at goal rate    Diet Prescription: Diet, NPO:   Tube Feeding Modality: Gastrostomy  Glucerna 1.5 Horacio  Total Volume for 24 Hours (mL): 1200  Continuous  Starting Tube Feed Rate {mL per Hour}: 50  Until Goal Tube Feed Rate (mL per Hour): 50  Tube Feed Duration (in Hours): 24  Tube Feed Start Time: 16:00  Free Water Flush  Bolus   Total Volume per Flush (mL): 300   Frequency: Every 8 Hours (11-07-21 @ 01:59)    Intake: N/A    Current Weight: 134# (11/10)  % Weight Change  128# (11/8)  124# (11/6, adm wt)    Pertinent Medications: MEDICATIONS  (STANDING):  ALBUTerol    90 MICROgram(s) HFA Inhaler 2 Puff(s) Inhalation every 6 hours  aspirin  chewable 81 milliGRAM(s) Oral daily  chlorhexidine 2% Cloths 1 Application(s) Topical daily  dextrose 40% Gel 15 Gram(s) Oral once  dextrose 5%. 1000 milliLiter(s) (50 mL/Hr) IV Continuous <Continuous>  dextrose 5%. 1000 milliLiter(s) (100 mL/Hr) IV Continuous <Continuous>  dextrose 50% Injectable 25 Gram(s) IV Push once  dextrose 50% Injectable 12.5 Gram(s) IV Push once  dextrose 50% Injectable 25 Gram(s) IV Push once  glucagon  Injectable 1 milliGRAM(s) IntraMuscular once  heparin   Injectable 5000 Unit(s) SubCutaneous every 8 hours  insulin lispro (ADMELOG) corrective regimen sliding scale   SubCutaneous every 6 hours  lactated ringers. 500 milliLiter(s) (50 mL/Hr) IV Continuous <Continuous>  lactobacillus acidophilus 1 Tablet(s) Oral daily  LORazepam     Tablet 1 milliGRAM(s) Oral three times a day  LORazepam   Injectable 2 milliGRAM(s) IV Push once  methocarbamol 250 milliGRAM(s) Oral two times a day  pantoprazole   Suspension 40 milliGRAM(s) Oral daily  polyethylene glycol 3350 17 Gram(s) Oral every 12 hours  potassium phosphate / sodium phosphate Powder (PHOS-NaK) 1 Packet(s) Oral every 4 hours  senna 2 Tablet(s) Oral at bedtime  simethicone 80 milliGRAM(s) Chew every 6 hours  sucralfate suspension 1 Gram(s) Oral four times a day    MEDICATIONS  (PRN):  acetaminophen     Tablet .. 650 milliGRAM(s) Oral every 6 hours PRN Mild Pain (1 - 3)  acetaminophen    Suspension .. 650 milliGRAM(s) Oral every 6 hours PRN Temp greater or equal to 38C (100.4F)  LORazepam   Injectable 2 milliGRAM(s) IV Push every 2 hours PRN Aggitation / Vent Compliance / Tachypnea > 30    Pertinent Labs: 11-10 Na141 mmol/L Glu 119 mg/dL<H> K+ 4.3 mmol/L Cr  0.99 mg/dL BUN 37 mg/dL<H> 11-10 Phos 1.5 mg/dL<L> 11-10 Alb 1.7 g/dL<L>    CAPILLARY BLOOD GLUCOSE:  POCT Blood Glucose.: 173 mg/dL (10 Nov 2021 11:02)  POCT Blood Glucose.: 121 mg/dL (10 Nov 2021 06:00)  POCT Blood Glucose.: 183 mg/dL (09 Nov 2021 23:56)  POCT Blood Glucose.: 169 mg/dL (09 Nov 2021 18:28)    Skin: wounds; L/R 1st toe, pressure injuries; DTI to R 4th and 5th toes, stage I to BL heels    Estimated Needs:   [X] no change since previous assessment  [ ] recalculated:     Previous Nutrition Diagnosis:   [ ] Inadequate Energy Intake [ ]Inadequate Oral Intake [ ] Excessive Energy Intake   [ ] Underweight [X] Increased Nutrient Needs [ ] Overweight/Obesity   [ ] Altered GI Function [ ] Unintended Weight Loss [ ] Food & Nutrition Related Knowledge Deficit [ ] Malnutrition     Nutrition Diagnosis is [X] ongoing  [ ] resolved [ ] not applicable     New Nutrition Diagnosis: [X] not applicable     Interventions: Continue nutrition care plan, continuous EN feeds of Glucerna 1.5 at goal rate of 50 ml/hr, recommend addition of no carb prosource for additional kcal/protein  Recommend  [ ] Change Diet To:  [X] Nutrition Supplement: no carb prosource BID for additional 120kcal, 30 g protein/day  [ ] Nutrition Support  [ ] Other:     Monitoring and Evaluation:   [ ] PO intake [ x ] Tolerance to diet prescription [ x ] weights [ x ] labs[ x ] follow up per protocol  [ ] other:

## 2021-11-10 NOTE — PROGRESS NOTE ADULT - SUBJECTIVE AND OBJECTIVE BOX
Patient is a 75y Female whom presented to the hospital with manasa     PAST MEDICAL & SURGICAL HISTORY:  Dementia of frontal lobe type    Aphasic stroke    Diabetes mellitus    Respiratory failure    Hypertension    GERD (gastroesophageal reflux disease)    Constipation    Respiratory failure    CVA (cerebral vascular accident)    HTN (hypertension)    DM (diabetes mellitus)    Advanced dementia    COVID-19 virus detected    Quadriplegia    Pneumonia    Type II diabetes mellitus    Hx of appendectomy    Gastrostomy in place    Tracheostomy in place    Tracheostomy tube present    Feeding by G-tube        MEDICATIONS  (STANDING):  ALBUTerol    90 MICROgram(s) HFA Inhaler 2 Puff(s) Inhalation every 6 hours  albuterol/ipratropium for Nebulization 3 milliLiter(s) Nebulizer every 6 hours  aspirin  chewable 81 milliGRAM(s) Oral daily  chlorhexidine 0.12% Liquid 15 milliLiter(s) Oral Mucosa every 12 hours  dextrose 40% Gel 15 Gram(s) Oral once  dextrose 5% + sodium chloride 0.9%. 1000 milliLiter(s) (80 mL/Hr) IV Continuous <Continuous>  dextrose 5%. 1000 milliLiter(s) (50 mL/Hr) IV Continuous <Continuous>  dextrose 5%. 1000 milliLiter(s) (100 mL/Hr) IV Continuous <Continuous>  dextrose 50% Injectable 25 Gram(s) IV Push once  dextrose 50% Injectable 12.5 Gram(s) IV Push once  dextrose 50% Injectable 25 Gram(s) IV Push once  glucagon  Injectable 1 milliGRAM(s) IntraMuscular once  heparin   Injectable 5000 Unit(s) SubCutaneous every 8 hours  lactobacillus acidophilus 1 Tablet(s) Oral daily  methocarbamol 250 milliGRAM(s) Oral two times a day  pantoprazole   Suspension 40 milliGRAM(s) Oral daily  polyethylene glycol 3350 17 Gram(s) Oral every 12 hours  senna 2 Tablet(s) Oral at bedtime  simethicone 80 milliGRAM(s) Chew every 6 hours  sucralfate suspension 1 Gram(s) Oral four times a day      Allergies    codeine (Hives)    Intolerances        SOCIAL HISTORY:  Denies ETOh,Smoking,     FAMILY HISTORY:      REVIEW OF SYSTEMS:    CONSTITUTIONAL: No weakness, fevers or chills  RESPIRATORY: No cough, wheezing, hemoptysis; No shortness of breath  CARDIOVASCULAR: No chest pain or palpitations  GASTROINTESTINAL: No abdominal or epigastric pain. No nausea, vomiting,                              7.8    x     )-----------( x        ( 10 Nov 2021 08:07 )             26.4       CBC Full  -  ( 10 Nov 2021 08:07 )  WBC Count : x  RBC Count : x  Hemoglobin : 7.8 g/dL  Hematocrit : 26.4 %  Platelet Count - Automated : x  Mean Cell Volume : x  Mean Cell Hemoglobin : x  Mean Cell Hemoglobin Concentration : x  Auto Neutrophil # : x  Auto Lymphocyte # : x  Auto Monocyte # : x  Auto Eosinophil # : x  Auto Basophil # : x  Auto Neutrophil % : x  Auto Lymphocyte % : x  Auto Monocyte % : x  Auto Eosinophil % : x  Auto Basophil % : x      11-10    141  |  105  |  37<H>  ----------------------------<  119<H>  4.3   |  27  |  0.99    Ca    7.7<L>      10 Nov 2021 06:32  Phos  1.5     11-10  Mg     2.3     11-10    TPro  5.3<L>  /  Alb  1.7<L>  /  TBili  0.3  /  DBili  x   /  AST  27  /  ALT  28  /  AlkPhos  75  11-10      CAPILLARY BLOOD GLUCOSE      POCT Blood Glucose.: 176 mg/dL (10 Nov 2021 21:57)  POCT Blood Glucose.: 165 mg/dL (10 Nov 2021 17:04)  POCT Blood Glucose.: 173 mg/dL (10 Nov 2021 11:02)  POCT Blood Glucose.: 121 mg/dL (10 Nov 2021 06:00)  POCT Blood Glucose.: 183 mg/dL (09 Nov 2021 23:56)      Vital Signs Last 24 Hrs  T(C): 36.8 (10 Nov 2021 20:05), Max: 37.1 (10 Nov 2021 12:46)  T(F): 98.2 (10 Nov 2021 20:05), Max: 98.7 (10 Nov 2021 12:46)  HR: 69 (10 Nov 2021 22:00) (58 - 70)  BP: 130/50 (10 Nov 2021 22:00) (81/63 - 130/50)  BP(mean): 75 (10 Nov 2021 22:00) (64 - 82)  RR: 20 (10 Nov 2021 22:00) (15 - 28)  SpO2: 100% (10 Nov 2021 22:00) (97% - 100%)            PHYSICAL EXAM:    Constitutional: NAD  HEENT: conjunctive   clear   Neck:  No JVD  Respiratory: pos for trach    Cardiovascular: S1 and S2  Gastrointestinal: BS+, soft, pos for peg   Extremities: No peripheral edema

## 2021-11-10 NOTE — PROGRESS NOTE ADULT - SUBJECTIVE AND OBJECTIVE BOX
Date/Time Patient Seen:  		  Referring MD:   Data Reviewed	       Patient is a 75y old  Female who presents with a chief complaint of abnormal labs (09 Nov 2021 21:41)      Subjective/HPI     PAST MEDICAL & SURGICAL HISTORY:  Dementia of frontal lobe type    Aphasic stroke    Diabetes mellitus    Respiratory failure    Hypertension    GERD (gastroesophageal reflux disease)    Constipation    Respiratory failure    CVA (cerebral vascular accident)    HTN (hypertension)    DM (diabetes mellitus)    Advanced dementia    COVID-19 virus detected    Quadriplegia    Pneumonia    Type II diabetes mellitus    Hx of appendectomy    Gastrostomy in place    Tracheostomy in place    Tracheostomy tube present    Feeding by G-tube          Medication list         MEDICATIONS  (STANDING):  ALBUTerol    90 MICROgram(s) HFA Inhaler 2 Puff(s) Inhalation every 6 hours  aspirin  chewable 81 milliGRAM(s) Oral daily  chlorhexidine 2% Cloths 1 Application(s) Topical daily  dextrose 40% Gel 15 Gram(s) Oral once  dextrose 5%. 1000 milliLiter(s) (50 mL/Hr) IV Continuous <Continuous>  dextrose 5%. 1000 milliLiter(s) (100 mL/Hr) IV Continuous <Continuous>  dextrose 50% Injectable 25 Gram(s) IV Push once  dextrose 50% Injectable 12.5 Gram(s) IV Push once  dextrose 50% Injectable 25 Gram(s) IV Push once  glucagon  Injectable 1 milliGRAM(s) IntraMuscular once  heparin   Injectable 5000 Unit(s) SubCutaneous every 8 hours  insulin lispro (ADMELOG) corrective regimen sliding scale   SubCutaneous every 6 hours  lactated ringers. 1000 milliLiter(s) (75 mL/Hr) IV Continuous <Continuous>  lactobacillus acidophilus 1 Tablet(s) Oral daily  LORazepam     Tablet 1 milliGRAM(s) Oral three times a day  LORazepam   Injectable 2 milliGRAM(s) IV Push once  methocarbamol 250 milliGRAM(s) Oral two times a day  pantoprazole   Suspension 40 milliGRAM(s) Oral daily  polyethylene glycol 3350 17 Gram(s) Oral every 12 hours  senna 2 Tablet(s) Oral at bedtime  simethicone 80 milliGRAM(s) Chew every 6 hours  sucralfate suspension 1 Gram(s) Oral four times a day    MEDICATIONS  (PRN):  acetaminophen     Tablet .. 650 milliGRAM(s) Oral every 6 hours PRN Mild Pain (1 - 3)  acetaminophen    Suspension .. 650 milliGRAM(s) Oral every 6 hours PRN Temp greater or equal to 38C (100.4F)  LORazepam   Injectable 2 milliGRAM(s) IV Push every 2 hours PRN Aggitation / Vent Compliance / Tachypnea > 30         Vitals log        ICU Vital Signs Last 24 Hrs  T(C): 36.5 (10 Nov 2021 04:00), Max: 38.2 (09 Nov 2021 08:00)  T(F): 97.7 (10 Nov 2021 04:00), Max: 100.7 (09 Nov 2021 08:00)  HR: 62 (10 Nov 2021 07:00) (58 - 87)  BP: 98/51 (10 Nov 2021 06:00) (81/63 - 121/62)  BP(mean): 66 (10 Nov 2021 06:00) (66 - 91)  ABP: --  ABP(mean): --  RR: 16 (10 Nov 2021 06:00) (14 - 33)  SpO2: 100% (10 Nov 2021 07:00) (98% - 100%)       Mode: PRVC  RR (machine): 18  TV (machine): 400  FiO2: 40  PEEP: 5  ITime: 1  MAP: 12  PIP: 30      Input and Output:  I&O's Detail    09 Nov 2021 07:01  -  10 Nov 2021 07:00  --------------------------------------------------------  IN:    Enteral Tube Flush: 980 mL    Glucerna 1.5: 1150 mL    Lactated Ringers: 980 mL    Lactated Ringers Bolus: 500 mL    sodium chloride 0.45%: 160 mL  Total IN: 3770 mL    OUT:    Voided (mL): 400 mL  Total OUT: 400 mL    Total NET: 3370 mL          Lab Data                        7.8    13.41 )-----------( 228      ( 09 Nov 2021 05:58 )             26.3     11-10    141  |  105  |  37<H>  ----------------------------<  119<H>  4.3   |  27  |  0.99    Ca    7.7<L>      10 Nov 2021 06:32    TPro  5.3<L>  /  Alb  1.7<L>  /  TBili  0.3  /  DBili  x   /  AST  27  /  ALT  28  /  AlkPhos  75  11-10            Review of Systems	      Objective     Physical Examination    heart s1s2  lung dec BS  abd soft      Pertinent Lab findings & Imaging      Annalise:  NO   Adequate UO     I&O's Detail    09 Nov 2021 07:01  -  10 Nov 2021 07:00  --------------------------------------------------------  IN:    Enteral Tube Flush: 980 mL    Glucerna 1.5: 1150 mL    Lactated Ringers: 980 mL    Lactated Ringers Bolus: 500 mL    sodium chloride 0.45%: 160 mL  Total IN: 3770 mL    OUT:    Voided (mL): 400 mL  Total OUT: 400 mL    Total NET: 3370 mL               Discussed with:     Cultures:	        Radiology

## 2021-11-10 NOTE — PROGRESS NOTE ADULT - NSPROGADDITIONALINFOA_GEN_ALL_CORE
Discussed with spouse Pawan in detail. He is concerned about potential antibiotic use at [facility], would like to discuss with Dr. Olivas. Message sent to Dr. Merrill to sign out patient at [facility]. Patient is medically optimized for return to [facility] once arrangements are made. Discussed with ANETTE Mckoy

## 2021-11-10 NOTE — PROGRESS NOTE ADULT - SUBJECTIVE AND OBJECTIVE BOX
Patient is a 75y old  Female who presents with a chief complaint of abnormal labs (10 Nov 2021 11:38)      BRIEF HOSPITAL COURSE:   Pt is a 76 y/o female with PMHx of HTN, DM, prior CVA, dementia, recent cardiac arrest @ [facility], chronic VDRF s/p trach/peg admitted w/ [facility] presumed secondary to dehydration. Admitted to [facility].     Events last 24 hours: Vented. HD stable. NAD    PAST MEDICAL & SURGICAL HISTORY:  Dementia of frontal lobe type    Aphasic stroke    Diabetes mellitus    Respiratory failure    Hypertension    GERD (gastroesophageal reflux disease)    Constipation    Respiratory failure    CVA (cerebral vascular accident)    HTN (hypertension)    DM (diabetes mellitus)    Advanced dementia    COVID-19 virus detected    Quadriplegia    Pneumonia    Type II diabetes mellitus    Hx of appendectomy    Gastrostomy in place    Tracheostomy in place    Tracheostomy tube present    Feeding by G-tube        Review of Systems:  Unable to perform trached, vented    Medications:      ALBUTerol    90 MICROgram(s) HFA Inhaler 2 Puff(s) Inhalation every 6 hours    acetaminophen     Tablet .. 650 milliGRAM(s) Oral every 6 hours PRN  acetaminophen    Suspension .. 650 milliGRAM(s) Oral every 6 hours PRN  LORazepam     Tablet 1 milliGRAM(s) Oral three times a day  LORazepam   Injectable 2 milliGRAM(s) IV Push every 2 hours PRN  LORazepam   Injectable 2 milliGRAM(s) IV Push once  methocarbamol 250 milliGRAM(s) Oral two times a day      aspirin  chewable 81 milliGRAM(s) Oral daily  heparin   Injectable 5000 Unit(s) SubCutaneous every 8 hours    pantoprazole   Suspension 40 milliGRAM(s) Oral daily  polyethylene glycol 3350 17 Gram(s) Oral every 12 hours  senna 2 Tablet(s) Oral at bedtime  simethicone 80 milliGRAM(s) Chew every 6 hours  sucralfate suspension 1 Gram(s) Oral four times a day      dextrose 40% Gel 15 Gram(s) Oral once  dextrose 50% Injectable 25 Gram(s) IV Push once  dextrose 50% Injectable 12.5 Gram(s) IV Push once  dextrose 50% Injectable 25 Gram(s) IV Push once  glucagon  Injectable 1 milliGRAM(s) IntraMuscular once  insulin lispro (ADMELOG) corrective regimen sliding scale   SubCutaneous every 6 hours    dextrose 5%. 1000 milliLiter(s) IV Continuous <Continuous>  dextrose 5%. 1000 milliLiter(s) IV Continuous <Continuous>  lactated ringers. 500 milliLiter(s) IV Continuous <Continuous>      chlorhexidine 2% Cloths 1 Application(s) Topical daily    lactobacillus acidophilus 1 Tablet(s) Oral daily      Mode: AC/ CMV (Assist Control/ Continuous Mandatory Ventilation)  RR (machine): 18  TV (machine): 400  FiO2: 40  PEEP: 5  ITime: 1  MAP: 12  PIP: 25      ICU Vital Signs Last 24 Hrs  T(C): 36.8 (10 Nov 2021 20:05), Max: 37.1 (10 Nov 2021 12:46)  T(F): 98.2 (10 Nov 2021 20:05), Max: 98.7 (10 Nov 2021 12:46)  HR: 66 (10 Nov 2021 20:00) (58 - 70)  BP: 116/59 (10 Nov 2021 20:00) (81/63 - 124/46)  BP(mean): 77 (10 Nov 2021 20:00) (64 - 82)  ABP: --  ABP(mean): --  RR: 21 (10 Nov 2021 20:00) (15 - 28)  SpO2: 99% (10 Nov 2021 20:00) (97% - 100%)          I&O's Detail    09 Nov 2021 07:01  -  10 Nov 2021 07:00  --------------------------------------------------------  IN:    Enteral Tube Flush: 1280 mL    Glucerna 1.5: 1150 mL    Lactated Ringers: 980 mL    Lactated Ringers Bolus: 500 mL    sodium chloride 0.45%: 160 mL  Total IN: 4070 mL    OUT:    Voided (mL): 400 mL  Total OUT: 400 mL    Total NET: 3670 mL      10 Nov 2021 07:01  -  10 Nov 2021 22:30  --------------------------------------------------------  IN:    Enteral Tube Flush: 600 mL    Glucerna 1.5: 600 mL    Lactated Ringers: 250 mL  Total IN: 1450 mL    OUT:  Total OUT: 0 mL    Total NET: 1450 mL          LABS:                        7.8    x     )-----------( x        ( 10 Nov 2021 08:07 )             26.4     11-10    141  |  105  |  37<H>  ----------------------------<  119<H>  4.3   |  27  |  0.99    Ca    7.7<L>      10 Nov 2021 06:32  Phos  1.5     11-10  Mg     2.3     11-10    TPro  5.3<L>  /  Alb  1.7<L>  /  TBili  0.3  /  DBili  x   /  AST  27  /  ALT  28  /  AlkPhos  75  11-10          CAPILLARY BLOOD GLUCOSE      POCT Blood Glucose.: 176 mg/dL (10 Nov 2021 21:57)        CULTURES:  Culture Results:   Few Pseudomonas aeruginosa (Carbapenem Resistant)  Normal Respiratory Alejandro present (11-08 @ 13:11)  Culture Results:   No growth to date. (11-07 @ 12:02)  Culture Results:   No growth to date. (11-07 @ 12:02)  Culture Results:   >100,000 CFU/ml Candida albicans "Susceptibilities not performed"  <10,000 CFU/ml Normal Urogenital alejandro present (11-07 @ 11:33)  Rapid RVP Result: NotDetec (11-06 @ 23:12)      Physical Examination:      General: NAD     HEENT: Pupils equal, reactive to light.  Symmetric. No scleral icterus or injection. Trach site clean.     PULM: Clear to auscultation bilaterally, no wheezes, rales or rhonchi, no significant sputum production or increased respiratory effort    NECK: Supple, no lymphadenopathy, trachea midline    CVS: Regular rate and rhythm, no murmurs, +s1/s2    ABD: Soft, nondistended, normoactive bowel sounds, +Peg, site clean    EXT: No edema    SKIN: Warm and well perfused    NEURO: Unresponsive    RADIOLOGY: < from: US Kidney and Bladder (11.07.21 @ 14:18) >  IMPRESSION:    No hydronephrosis.    < end of copied text >  < from: CT Chest No Cont (11.07.21 @ 02:52) >  IMPRESSION:    Bilateral effusions and bibasilar atelectasis not significantly changed. Calcific density within the right lower lobe is new since the previous chest CT, and may represent aspirated material. Developing aspiration pneumonia cannot be excluded. Correlate clinically.    Previously seen bilateral patchy airspace opacities in a perihilar distribution have almost completely resolved since the previous chest CT.    < end of copied text >  < from: CT Renal Stone Hunt (11.07.21 @ 01:08) >  IMPRESSION:    No hydronephrosis as clinically questioned.    < end of copied text >

## 2021-11-11 ENCOUNTER — TRANSCRIPTION ENCOUNTER (OUTPATIENT)
Age: 76
End: 2021-11-11

## 2021-11-11 VITALS
HEART RATE: 92 BPM | SYSTOLIC BLOOD PRESSURE: 103 MMHG | RESPIRATION RATE: 18 BRPM | DIASTOLIC BLOOD PRESSURE: 62 MMHG | OXYGEN SATURATION: 100 %

## 2021-11-11 LAB
ALBUMIN SERPL ELPH-MCNC: 2 G/DL — LOW (ref 3.3–5)
ALP SERPL-CCNC: 97 U/L — SIGNIFICANT CHANGE UP (ref 30–120)
ALT FLD-CCNC: 28 U/L DA — SIGNIFICANT CHANGE UP (ref 10–60)
ANION GAP SERPL CALC-SCNC: 8 MMOL/L — SIGNIFICANT CHANGE UP (ref 5–17)
AST SERPL-CCNC: 21 U/L — SIGNIFICANT CHANGE UP (ref 10–40)
BASOPHILS # BLD AUTO: 0.02 K/UL — SIGNIFICANT CHANGE UP (ref 0–0.2)
BASOPHILS NFR BLD AUTO: 0.4 % — SIGNIFICANT CHANGE UP (ref 0–2)
BILIRUB SERPL-MCNC: 0.3 MG/DL — SIGNIFICANT CHANGE UP (ref 0.2–1.2)
BUN SERPL-MCNC: 29 MG/DL — HIGH (ref 7–23)
CALCIUM SERPL-MCNC: 8.8 MG/DL — SIGNIFICANT CHANGE UP (ref 8.4–10.5)
CHLORIDE SERPL-SCNC: 106 MMOL/L — SIGNIFICANT CHANGE UP (ref 96–108)
CO2 SERPL-SCNC: 25 MMOL/L — SIGNIFICANT CHANGE UP (ref 22–31)
CREAT SERPL-MCNC: 0.91 MG/DL — SIGNIFICANT CHANGE UP (ref 0.5–1.3)
EOSINOPHIL # BLD AUTO: 0.12 K/UL — SIGNIFICANT CHANGE UP (ref 0–0.5)
EOSINOPHIL NFR BLD AUTO: 2.2 % — SIGNIFICANT CHANGE UP (ref 0–6)
FUNGITELL: 106 PG/ML — HIGH
GLUCOSE SERPL-MCNC: 171 MG/DL — HIGH (ref 70–99)
HCT VFR BLD CALC: 27.2 % — LOW (ref 34.5–45)
HGB BLD-MCNC: 8.3 G/DL — LOW (ref 11.5–15.5)
IMM GRANULOCYTES NFR BLD AUTO: 1.7 % — HIGH (ref 0–1.5)
LYMPHOCYTES # BLD AUTO: 1.26 K/UL — SIGNIFICANT CHANGE UP (ref 1–3.3)
LYMPHOCYTES # BLD AUTO: 23.3 % — SIGNIFICANT CHANGE UP (ref 13–44)
MAGNESIUM SERPL-MCNC: 2 MG/DL — SIGNIFICANT CHANGE UP (ref 1.6–2.6)
MCHC RBC-ENTMCNC: 26.3 PG — LOW (ref 27–34)
MCHC RBC-ENTMCNC: 30.5 GM/DL — LOW (ref 32–36)
MCV RBC AUTO: 86.3 FL — SIGNIFICANT CHANGE UP (ref 80–100)
MONOCYTES # BLD AUTO: 0.51 K/UL — SIGNIFICANT CHANGE UP (ref 0–0.9)
MONOCYTES NFR BLD AUTO: 9.4 % — SIGNIFICANT CHANGE UP (ref 2–14)
NEUTROPHILS # BLD AUTO: 3.4 K/UL — SIGNIFICANT CHANGE UP (ref 1.8–7.4)
NEUTROPHILS NFR BLD AUTO: 63 % — SIGNIFICANT CHANGE UP (ref 43–77)
NRBC # BLD: 0 /100 WBCS — SIGNIFICANT CHANGE UP (ref 0–0)
PHOSPHATE SERPL-MCNC: 2.2 MG/DL — LOW (ref 2.5–4.5)
PLATELET # BLD AUTO: 228 K/UL — SIGNIFICANT CHANGE UP (ref 150–400)
POTASSIUM SERPL-MCNC: 4.4 MMOL/L — SIGNIFICANT CHANGE UP (ref 3.5–5.3)
POTASSIUM SERPL-SCNC: 4.4 MMOL/L — SIGNIFICANT CHANGE UP (ref 3.5–5.3)
PROT SERPL-MCNC: 6.6 G/DL — SIGNIFICANT CHANGE UP (ref 6–8.3)
RBC # BLD: 3.15 M/UL — LOW (ref 3.8–5.2)
RBC # FLD: 15.9 % — HIGH (ref 10.3–14.5)
SODIUM SERPL-SCNC: 139 MMOL/L — SIGNIFICANT CHANGE UP (ref 135–145)
WBC # BLD: 5.4 K/UL — SIGNIFICANT CHANGE UP (ref 3.8–10.5)
WBC # FLD AUTO: 5.4 K/UL — SIGNIFICANT CHANGE UP (ref 3.8–10.5)

## 2021-11-11 PROCEDURE — 36415 COLL VENOUS BLD VENIPUNCTURE: CPT

## 2021-11-11 PROCEDURE — 94760 N-INVAS EAR/PLS OXIMETRY 1: CPT

## 2021-11-11 PROCEDURE — 86769 SARS-COV-2 COVID-19 ANTIBODY: CPT

## 2021-11-11 PROCEDURE — 74176 CT ABD & PELVIS W/O CONTRAST: CPT | Mod: MA

## 2021-11-11 PROCEDURE — 87040 BLOOD CULTURE FOR BACTERIA: CPT

## 2021-11-11 PROCEDURE — 71250 CT THORAX DX C-: CPT

## 2021-11-11 PROCEDURE — 0225U NFCT DS DNA&RNA 21 SARSCOV2: CPT

## 2021-11-11 PROCEDURE — 80048 BASIC METABOLIC PNL TOTAL CA: CPT

## 2021-11-11 PROCEDURE — 87086 URINE CULTURE/COLONY COUNT: CPT

## 2021-11-11 PROCEDURE — 81001 URINALYSIS AUTO W/SCOPE: CPT

## 2021-11-11 PROCEDURE — 96361 HYDRATE IV INFUSION ADD-ON: CPT

## 2021-11-11 PROCEDURE — 85025 COMPLETE CBC W/AUTO DIFF WBC: CPT

## 2021-11-11 PROCEDURE — 87640 STAPH A DNA AMP PROBE: CPT

## 2021-11-11 PROCEDURE — 76770 US EXAM ABDO BACK WALL COMP: CPT

## 2021-11-11 PROCEDURE — 87186 SC STD MICRODIL/AGAR DIL: CPT

## 2021-11-11 PROCEDURE — 94002 VENT MGMT INPAT INIT DAY: CPT

## 2021-11-11 PROCEDURE — 99239 HOSP IP/OBS DSCHRG MGMT >30: CPT

## 2021-11-11 PROCEDURE — 96365 THER/PROPH/DIAG IV INF INIT: CPT

## 2021-11-11 PROCEDURE — 86901 BLOOD TYPING SEROLOGIC RH(D): CPT

## 2021-11-11 PROCEDURE — 80053 COMPREHEN METABOLIC PANEL: CPT

## 2021-11-11 PROCEDURE — 83880 ASSAY OF NATRIURETIC PEPTIDE: CPT

## 2021-11-11 PROCEDURE — 87449 NOS EACH ORGANISM AG IA: CPT

## 2021-11-11 PROCEDURE — 87641 MR-STAPH DNA AMP PROBE: CPT

## 2021-11-11 PROCEDURE — 85018 HEMOGLOBIN: CPT

## 2021-11-11 PROCEDURE — 71045 X-RAY EXAM CHEST 1 VIEW: CPT

## 2021-11-11 PROCEDURE — 85014 HEMATOCRIT: CPT

## 2021-11-11 PROCEDURE — 83735 ASSAY OF MAGNESIUM: CPT

## 2021-11-11 PROCEDURE — 87070 CULTURE OTHR SPECIMN AEROBIC: CPT

## 2021-11-11 PROCEDURE — 94664 DEMO&/EVAL PT USE INHALER: CPT

## 2021-11-11 PROCEDURE — 84145 PROCALCITONIN (PCT): CPT

## 2021-11-11 PROCEDURE — 94799 UNLISTED PULMONARY SVC/PX: CPT

## 2021-11-11 PROCEDURE — 94003 VENT MGMT INPAT SUBQ DAY: CPT

## 2021-11-11 PROCEDURE — 85027 COMPLETE CBC AUTOMATED: CPT

## 2021-11-11 PROCEDURE — 82962 GLUCOSE BLOOD TEST: CPT

## 2021-11-11 PROCEDURE — U0005: CPT

## 2021-11-11 PROCEDURE — 84100 ASSAY OF PHOSPHORUS: CPT

## 2021-11-11 PROCEDURE — 85610 PROTHROMBIN TIME: CPT

## 2021-11-11 PROCEDURE — 82533 TOTAL CORTISOL: CPT

## 2021-11-11 PROCEDURE — 86900 BLOOD TYPING SEROLOGIC ABO: CPT

## 2021-11-11 PROCEDURE — 83605 ASSAY OF LACTIC ACID: CPT

## 2021-11-11 PROCEDURE — U0003: CPT

## 2021-11-11 PROCEDURE — 93005 ELECTROCARDIOGRAM TRACING: CPT

## 2021-11-11 PROCEDURE — 94640 AIRWAY INHALATION TREATMENT: CPT

## 2021-11-11 PROCEDURE — 85730 THROMBOPLASTIN TIME PARTIAL: CPT

## 2021-11-11 PROCEDURE — 99281 EMR DPT VST MAYX REQ PHY/QHP: CPT | Mod: 25

## 2021-11-11 PROCEDURE — 87077 CULTURE AEROBIC IDENTIFY: CPT

## 2021-11-11 PROCEDURE — 86850 RBC ANTIBODY SCREEN: CPT

## 2021-11-11 RX ORDER — SODIUM CHLORIDE 9 MG/ML
500 INJECTION, SOLUTION INTRAVENOUS
Refills: 0 | Status: DISCONTINUED | OUTPATIENT
Start: 2021-11-11 | End: 2021-11-11

## 2021-11-11 RX ORDER — HEPARIN SODIUM 5000 [USP'U]/ML
0 INJECTION INTRAVENOUS; SUBCUTANEOUS
Qty: 0 | Refills: 0 | DISCHARGE
Start: 2021-11-11

## 2021-11-11 RX ORDER — SODIUM,POTASSIUM PHOSPHATES 278-250MG
1 POWDER IN PACKET (EA) ORAL ONCE
Refills: 0 | Status: COMPLETED | OUTPATIENT
Start: 2021-11-11 | End: 2021-11-11

## 2021-11-11 RX ORDER — FENTANYL CITRATE 50 UG/ML
50 INJECTION INTRAVENOUS ONCE
Refills: 0 | Status: COMPLETED | OUTPATIENT
Start: 2021-11-11 | End: 2021-11-11

## 2021-11-11 RX ADMIN — Medication 1 GRAM(S): at 11:45

## 2021-11-11 RX ADMIN — Medication 2 MILLIGRAM(S): at 07:35

## 2021-11-11 RX ADMIN — Medication 2 MILLIGRAM(S): at 13:33

## 2021-11-11 RX ADMIN — Medication 1 PACKET(S): at 11:45

## 2021-11-11 RX ADMIN — Medication 1 MILLIGRAM(S): at 05:48

## 2021-11-11 RX ADMIN — CHLORHEXIDINE GLUCONATE 1 APPLICATION(S): 213 SOLUTION TOPICAL at 11:46

## 2021-11-11 RX ADMIN — Medication 2 MILLIGRAM(S): at 03:40

## 2021-11-11 RX ADMIN — ALBUTEROL 2 PUFF(S): 90 AEROSOL, METERED ORAL at 07:49

## 2021-11-11 RX ADMIN — POLYETHYLENE GLYCOL 3350 17 GRAM(S): 17 POWDER, FOR SOLUTION ORAL at 05:48

## 2021-11-11 RX ADMIN — ALBUTEROL 2 PUFF(S): 90 AEROSOL, METERED ORAL at 00:37

## 2021-11-11 RX ADMIN — PANTOPRAZOLE SODIUM 40 MILLIGRAM(S): 20 TABLET, DELAYED RELEASE ORAL at 11:45

## 2021-11-11 RX ADMIN — Medication 1 GRAM(S): at 00:02

## 2021-11-11 RX ADMIN — HEPARIN SODIUM 5000 UNIT(S): 5000 INJECTION INTRAVENOUS; SUBCUTANEOUS at 05:49

## 2021-11-11 RX ADMIN — Medication 1 MILLIGRAM(S): at 13:08

## 2021-11-11 RX ADMIN — SIMETHICONE 80 MILLIGRAM(S): 80 TABLET, CHEWABLE ORAL at 00:02

## 2021-11-11 RX ADMIN — SODIUM CHLORIDE 50 MILLILITER(S): 9 INJECTION, SOLUTION INTRAVENOUS at 11:48

## 2021-11-11 RX ADMIN — Medication 1 TABLET(S): at 11:45

## 2021-11-11 RX ADMIN — Medication 1 GRAM(S): at 05:49

## 2021-11-11 RX ADMIN — SIMETHICONE 80 MILLIGRAM(S): 80 TABLET, CHEWABLE ORAL at 05:49

## 2021-11-11 RX ADMIN — METHOCARBAMOL 250 MILLIGRAM(S): 500 TABLET, FILM COATED ORAL at 05:49

## 2021-11-11 RX ADMIN — HEPARIN SODIUM 5000 UNIT(S): 5000 INJECTION INTRAVENOUS; SUBCUTANEOUS at 13:09

## 2021-11-11 RX ADMIN — Medication 81 MILLIGRAM(S): at 11:45

## 2021-11-11 RX ADMIN — ALBUTEROL 2 PUFF(S): 90 AEROSOL, METERED ORAL at 13:43

## 2021-11-11 RX ADMIN — SIMETHICONE 80 MILLIGRAM(S): 80 TABLET, CHEWABLE ORAL at 11:45

## 2021-11-11 RX ADMIN — Medication 1: at 11:45

## 2021-11-11 NOTE — PROGRESS NOTE ADULT - TIME BILLING
The total amount of time listed was spent reviewing the hospital notes, laboratory values, imaging findings, assessing/counseling the patient, discussing with consultant physicians, case management, social work, and nursing staff.
The total amount of time listed was spent reviewing the hospital notes, laboratory values, imaging findings, assessing/counseling the patient, discussing with consultant physicians, case management, social work, and nursing staff.

## 2021-11-11 NOTE — PROGRESS NOTE ADULT - SUBJECTIVE AND OBJECTIVE BOX
Date/Time Patient Seen:  		  Referring MD:   Data Reviewed	       Patient is a 75y old  Female who presents with a chief complaint of abnormal labs (10 Nov 2021 22:30)      Subjective/HPI     PAST MEDICAL & SURGICAL HISTORY:  Dementia of frontal lobe type    Aphasic stroke    Diabetes mellitus    Respiratory failure    Hypertension    GERD (gastroesophageal reflux disease)    Constipation    Respiratory failure    CVA (cerebral vascular accident)    HTN (hypertension)    DM (diabetes mellitus)    Advanced dementia    COVID-19 virus detected    Quadriplegia    Pneumonia    Type II diabetes mellitus    Hx of appendectomy    Gastrostomy in place    Tracheostomy in place    Tracheostomy tube present    Feeding by G-tube          Medication list         MEDICATIONS  (STANDING):  ALBUTerol    90 MICROgram(s) HFA Inhaler 2 Puff(s) Inhalation every 6 hours  aspirin  chewable 81 milliGRAM(s) Oral daily  chlorhexidine 2% Cloths 1 Application(s) Topical daily  dextrose 40% Gel 15 Gram(s) Oral once  dextrose 5%. 1000 milliLiter(s) (50 mL/Hr) IV Continuous <Continuous>  dextrose 5%. 1000 milliLiter(s) (100 mL/Hr) IV Continuous <Continuous>  dextrose 50% Injectable 25 Gram(s) IV Push once  dextrose 50% Injectable 12.5 Gram(s) IV Push once  dextrose 50% Injectable 25 Gram(s) IV Push once  glucagon  Injectable 1 milliGRAM(s) IntraMuscular once  heparin   Injectable 5000 Unit(s) SubCutaneous every 8 hours  insulin lispro (ADMELOG) corrective regimen sliding scale   SubCutaneous every 6 hours  lactated ringers. 500 milliLiter(s) (50 mL/Hr) IV Continuous <Continuous>  lactobacillus acidophilus 1 Tablet(s) Oral daily  LORazepam     Tablet 1 milliGRAM(s) Oral three times a day  LORazepam   Injectable 2 milliGRAM(s) IV Push once  methocarbamol 250 milliGRAM(s) Oral two times a day  pantoprazole   Suspension 40 milliGRAM(s) Oral daily  polyethylene glycol 3350 17 Gram(s) Oral every 12 hours  senna 2 Tablet(s) Oral at bedtime  simethicone 80 milliGRAM(s) Chew every 6 hours  sucralfate suspension 1 Gram(s) Oral four times a day    MEDICATIONS  (PRN):  acetaminophen     Tablet .. 650 milliGRAM(s) Oral every 6 hours PRN Mild Pain (1 - 3)  acetaminophen    Suspension .. 650 milliGRAM(s) Oral every 6 hours PRN Temp greater or equal to 38C (100.4F)  LORazepam   Injectable 2 milliGRAM(s) IV Push every 2 hours PRN Aggitation / Vent Compliance / Tachypnea > 30         Vitals log        ICU Vital Signs Last 24 Hrs  T(C): 37.1 (10 Nov 2021 23:59), Max: 37.1 (10 Nov 2021 12:46)  T(F): 98.8 (10 Nov 2021 23:59), Max: 98.8 (10 Nov 2021 23:59)  HR: 74 (11 Nov 2021 06:20) (60 - 83)  BP: 147/64 (11 Nov 2021 06:00) (93/50 - 151/108)  BP(mean): 87 (11 Nov 2021 06:00) (64 - 121)  ABP: --  ABP(mean): --  RR: 25 (11 Nov 2021 06:00) (15 - 46)  SpO2: 97% (11 Nov 2021 06:20) (97% - 100%)       Mode: AC/ CMV (Assist Control/ Continuous Mandatory Ventilation)  RR (machine): 18  TV (machine): 400  FiO2: 30  PEEP: 5  ITime: 1  MAP: 22  PIP: 45      Input and Output:  I&O's Detail    10 Nov 2021 07:01  -  11 Nov 2021 07:00  --------------------------------------------------------  IN:    Enteral Tube Flush: 960 mL    Glucerna 1.5: 1050 mL    Lactated Ringers: 250 mL  Total IN: 2260 mL    OUT:  Total OUT: 0 mL    Total NET: 2260 mL          Lab Data                        7.8    x     )-----------( x        ( 10 Nov 2021 08:07 )             26.4     11-10    141  |  105  |  37<H>  ----------------------------<  119<H>  4.3   |  27  |  0.99    Ca    7.7<L>      10 Nov 2021 06:32  Phos  1.5     11-10  Mg     2.3     11-10    TPro  5.3<L>  /  Alb  1.7<L>  /  TBili  0.3  /  DBili  x   /  AST  27  /  ALT  28  /  AlkPhos  75  11-10            Review of Systems	      Objective     Physical Examination    heart s1s2  lung dec BS  abd soft      Pertinent Lab findings & Imaging      Annalise:  NO   Adequate UO     I&O's Detail    10 Nov 2021 07:01  -  11 Nov 2021 07:00  --------------------------------------------------------  IN:    Enteral Tube Flush: 960 mL    Glucerna 1.5: 1050 mL    Lactated Ringers: 250 mL  Total IN: 2260 mL    OUT:  Total OUT: 0 mL    Total NET: 2260 mL               Discussed with:     Cultures:	        Radiology

## 2021-11-11 NOTE — PROGRESS NOTE ADULT - SUBJECTIVE AND OBJECTIVE BOX
Patient is a 75y old  Female who presents with a chief complaint of abnormal labs (11 Nov 2021 09:25)      INTERVAL HPI/OVERNIGHT EVENTS: Patient seen and examined at bedside. No overnight events.      MEDICATIONS  (STANDING):  ALBUTerol    90 MICROgram(s) HFA Inhaler 2 Puff(s) Inhalation every 6 hours  aspirin  chewable 81 milliGRAM(s) Oral daily  chlorhexidine 2% Cloths 1 Application(s) Topical daily  dextrose 40% Gel 15 Gram(s) Oral once  dextrose 5%. 1000 milliLiter(s) (50 mL/Hr) IV Continuous <Continuous>  dextrose 5%. 1000 milliLiter(s) (100 mL/Hr) IV Continuous <Continuous>  dextrose 50% Injectable 25 Gram(s) IV Push once  dextrose 50% Injectable 12.5 Gram(s) IV Push once  dextrose 50% Injectable 25 Gram(s) IV Push once  glucagon  Injectable 1 milliGRAM(s) IntraMuscular once  heparin   Injectable 5000 Unit(s) SubCutaneous every 8 hours  insulin lispro (ADMELOG) corrective regimen sliding scale   SubCutaneous every 6 hours  lactated ringers. 500 milliLiter(s) (50 mL/Hr) IV Continuous <Continuous>  lactobacillus acidophilus 1 Tablet(s) Oral daily  LORazepam     Tablet 1 milliGRAM(s) Oral three times a day  LORazepam   Injectable 2 milliGRAM(s) IV Push once  methocarbamol 250 milliGRAM(s) Oral two times a day  pantoprazole   Suspension 40 milliGRAM(s) Oral daily  polyethylene glycol 3350 17 Gram(s) Oral every 12 hours  senna 2 Tablet(s) Oral at bedtime  simethicone 80 milliGRAM(s) Chew every 6 hours  sucralfate suspension 1 Gram(s) Oral four times a day    MEDICATIONS  (PRN):  acetaminophen     Tablet .. 650 milliGRAM(s) Oral every 6 hours PRN Mild Pain (1 - 3)  acetaminophen    Suspension .. 650 milliGRAM(s) Oral every 6 hours PRN Temp greater or equal to 38C (100.4F)  LORazepam   Injectable 2 milliGRAM(s) IV Push every 2 hours PRN Aggitation / Vent Compliance / Tachypnea > 30      Allergies    codeine (Hives)    Intolerances        REVIEW OF SYSTEMS:  Unable to obtain meaningful ROS due to mental status      Vital Signs Last 24 Hrs  T(C): 36.7 (11 Nov 2021 08:23), Max: 37.1 (10 Nov 2021 12:46)  T(F): 98.1 (11 Nov 2021 08:23), Max: 98.8 (10 Nov 2021 23:59)  HR: 74 (11 Nov 2021 08:00) (60 - 83)  BP: 124/60 (11 Nov 2021 08:00) (93/50 - 151/108)  BP(mean): 79 (11 Nov 2021 08:00) (64 - 121)  RR: 16 (11 Nov 2021 08:00) (15 - 46)  SpO2: 99% (11 Nov 2021 08:00) (97% - 100%)    PHYSICAL EXAM:  GENERAL: chronically ill appearing, emaciated, NAD, obtunded  HEAD:  atraumatic  EYES: conjunctiva clear  NECK: +trach in place, clean  RESPIRATORY:   coarse mechanical breath sounds, no gross crackles or rales  CARDIOVASCULAR:  regular rate and rhythm, no murmurs or rubs or gallops, 2+ peripheral pulses  GASTROINTESTINAL:   soft, +PEG in place, clean and dry as well, nondistended, bowel sounds present  EXTREMITIES:     no clubbing or cyanosis or edema  MUSCULOSKELETAL:     +diffuse contractures in all joints  NERVOUS SYSTEM: does not respond to pain, moves extremities at times per RN  SKIN:     necrotic tips of bilateral 1st toes      LABS:                        8.3    5.40  )-----------( 228      ( 11 Nov 2021 08:37 )             27.2     CBC Full  -  ( 11 Nov 2021 08:37 )  WBC Count : 5.40 K/uL  Hemoglobin : 8.3 g/dL  Hematocrit : 27.2 %  Platelet Count - Automated : 228 K/uL  Mean Cell Volume : 86.3 fl  Mean Cell Hemoglobin : 26.3 pg  Mean Cell Hemoglobin Concentration : 30.5 gm/dL  Auto Neutrophil # : 3.40 K/uL  Auto Lymphocyte # : 1.26 K/uL  Auto Monocyte # : 0.51 K/uL  Auto Eosinophil # : 0.12 K/uL  Auto Basophil # : 0.02 K/uL  Auto Neutrophil % : 63.0 %  Auto Lymphocyte % : 23.3 %  Auto Monocyte % : 9.4 %  Auto Eosinophil % : 2.2 %  Auto Basophil % : 0.4 %    11 Nov 2021 08:37    139    |  106    |  29     ----------------------------<  171    4.4     |  25     |  0.91     Ca    8.8        11 Nov 2021 08:37  Phos  2.2       11 Nov 2021 08:37  Mg     2.0       11 Nov 2021 08:37    TPro  6.6    /  Alb  2.0    /  TBili  0.3    /  DBili  x      /  AST  21     /  ALT  28     /  AlkPhos  97     11 Nov 2021 08:37        CAPILLARY BLOOD GLUCOSE      POCT Blood Glucose.: 146 mg/dL (11 Nov 2021 05:53)  POCT Blood Glucose.: 176 mg/dL (10 Nov 2021 21:57)  POCT Blood Glucose.: 165 mg/dL (10 Nov 2021 17:04)  POCT Blood Glucose.: 173 mg/dL (10 Nov 2021 11:02)        Culture - Sputum (collected 11-08-21 @ 13:11)  Source: .Sputum Sputum  Gram Stain (11-08-21 @ 20:51):    Few polymorphonuclear leukocytes per low power field    No Squamous epithelial cells per low power field    Rare Gram Variable Rods per oil power field  Final Report (11-10-21 @ 17:27):    Few Pseudomonas aeruginosa (Carbapenem Resistant)    Normal Respiratory Alejandro present  Organism: Pseudomonas aeruginosa (Carbapenem Resistant) (11-10-21 @ 17:27)  Organism: Pseudomonas aeruginosa (Carbapenem Resistant) (11-10-21 @ 17:27)      -  Amikacin: S <=16      -  Aztreonam: S <=4      -  Cefepime: S <=2      -  Ceftazidime: S <=1      -  Ciprofloxacin: S <=0.25      -  Gentamicin: S <=2      -  Imipenem: R >8      -  Levofloxacin: S <=0.5      -  Meropenem: I 4      -  Piperacillin/Tazobactam: S <=8      -  Tobramycin: S <=2      Method Type: PRATIK    Culture - Blood (collected 11-07-21 @ 12:02)  Source: .Blood Blood-Peripheral  Preliminary Report (11-08-21 @ 13:01):    No growth to date.    Culture - Blood (collected 11-07-21 @ 12:02)  Source: .Blood Blood-Peripheral  Preliminary Report (11-08-21 @ 13:01):    No growth to date.    Culture - Urine (collected 11-07-21 @ 11:33)  Source: Clean Catch Clean Catch (Midstream)  Final Report (11-10-21 @ 06:13):    >100,000 CFU/ml Candida albicans "Susceptibilities not performed"    <10,000 CFU/ml Normal Urogenital alejandro present        RADIOLOGY & ADDITIONAL TESTS:     Consultant(s) Notes Reviewed:  [x] YES  [ ] NO    Care Discussed with [x] Consultants  [x] Patient  [ ] Family  [ ]      [ x] Other; RN  DVT ppx

## 2021-11-11 NOTE — PROGRESS NOTE ADULT - PROVIDER SPECIALTY LIST ADULT
Critical Care
Critical Care
Cardiology
Critical Care
Hospitalist
Infectious Disease
Palliative Care
Cardiology
Critical Care
Critical Care
Hospitalist
Infectious Disease
Infectious Disease
Nephrology
Nephrology
Palliative Care
Palliative Care
Critical Care
Hospitalist
Hospitalist
Infectious Disease
Nephrology
Palliative Care
Nephrology

## 2021-11-11 NOTE — PROGRESS NOTE ADULT - REASON FOR ADMISSION
abnormal labs

## 2021-11-11 NOTE — DISCHARGE NOTE PROVIDER - HOSPITAL COURSE
FROM ADMISSION H+P:   HPI:  ***Patient with dementia and is not responsive.  Collateral information obtained from chart and notes.***    75F with chronic resp failure - vent dependent, hx of subarachnoid hemorrhage, hx of cardiac arrest, dementia s/p PEG, HTN, DM2 on insulin, hx of CVA, GERD, COPD, admission here from 9/25 to 10/4 for respiratory failure/sepsis possibly 2/2 aspiration vs vent associated PNA who presents from [facility] for abnormal labs.  Unclear as to the reason why labs were done yesterday but patient was found to have an elevated BUN of 42 and then on repeat BUN of 100.  Was sent here for further evaluation.  In the ED, patient's initial triage vitals were BP 97/60, HR 72, RR 18, 100% on vent and T98.5F.  Labs showed leukocytosis of 13.7, BUN/Cr 104/2.45, lactate 3.3.  Patient given 1.8L of NS.  Patient's CT A/P showed no hydronephrosis but showed a calcific density within the right lung which may represent aspirated material (new from previous exam).  Started on vancomycin and zosyn empirically for PNA.  Patient's UA also showed many yeast and was started on fluconazole empirically as well.  Patient's repeat lactate trended down to 1.6 on repeat.  Patient RVP and COVID negative.        Calcific density within the right lung may represent aspirated material. This is new since the previous exam. (07 Nov 2021 01:30)      ---  HOSPITAL COURSE:   # RYAN   - likely pre-renal per discussion with nephro (Barry) given rapid response to IVF  - BUN/Cr improving from 104/2.45   - monitor BMP  - US kidney/bladder without sign of significant retention (bladder volume ~200cc but not a PVR as pt incontinent and cannot follow command to void)  - brown stool per RN (no sign of UGIB)    #Hypernatremia  resolved. Trend bmp    # leukocytosis:  - Afebrile, wbc count improved  - continue to monitor off Abx per ID (Brendon) recs  - UCx, BCx- NGTD    # hypoglycemia:  - did not receive insulin in this hospital, so likely from lantus pt received in SNF  - BG stable  - c/w tube feeds  - monitor FSG, now on LDISS  - endo (Perlman) consulted, recs appreciated    # hypokalemia:  - monitor BMP  - resolved    #COPD:  - c/w albuterol  - pulm (Jaime), recs appreciated    #GERD:  - c/w carafate and PPI     Patient is medically optimized to return to [facility], had long discussion with  and Dr. Olivas. Will follow up with Dr. Olivas outpatient. Patient signed out to [facility] MD Dr. Paul Merrill    ---  CONSULTANTS:     ---  TIME SPENT:  I, the attending physician, was physically present for the key portions of the evaluation and management (E/M) service provided. The total amount of time spent reviewing the hospital notes, laboratory values, imaging findings, assessing/counseling the patient, discussing with consultant physicians, social work, nursing staff was 51 minutes FROM ADMISSION H+P:   HPI:  ***Patient with dementia and is not responsive.  Collateral information obtained from chart and notes.***    75F with chronic resp failure - vent dependent, hx of subarachnoid hemorrhage, hx of cardiac arrest, dementia s/p PEG, HTN, DM2 on insulin, hx of CVA, GERD, COPD, admission here from 9/25 to 10/4 for respiratory failure/sepsis possibly 2/2 aspiration vs vent associated PNA who presents from [facility] for abnormal labs.  Unclear as to the reason why labs were done yesterday but patient was found to have an elevated BUN of 42 and then on repeat BUN of 100.  Was sent here for further evaluation.  In the ED, patient's initial triage vitals were BP 97/60, HR 72, RR 18, 100% on vent and T98.5F.  Labs showed leukocytosis of 13.7, BUN/Cr 104/2.45, lactate 3.3.  Patient given 1.8L of NS.  Patient's CT A/P showed no hydronephrosis but showed a calcific density within the right lung which may represent aspirated material (new from previous exam).  Started on vancomycin and zosyn empirically for PNA.  Patient's UA also showed many yeast and was started on fluconazole empirically as well.  Patient's repeat lactate trended down to 1.6 on repeat.  Patient RVP and COVID negative.        Calcific density within the right lung may represent aspirated material. This is new since the previous exam. (07 Nov 2021 01:30)      ---  HOSPITAL COURSE:   # RYAN   - likely pre-renal per discussion with nephro (Barry) given rapid response to IVF  - BUN/Cr improving from 104/2.45   - monitor BMP  - US kidney/bladder without sign of significant retention (bladder volume ~200cc but not a PVR as pt incontinent and cannot follow command to void)  - brown stool per RN (no sign of UGIB)  - Continue with gentle hydration at [facility] either via PEG or IV as needed     #Hypernatremia  resolved. Trend bmp    # leukocytosis:  - Afebrile, wbc count improved  - continue to monitor off Abx per ID (Brendon) recs  - UCx, BCx- NGTD    # hypoglycemia:  - did not receive insulin in this hospital, so likely from lantus pt received in SNF  - BG stable  - c/w tube feeds  - monitor FSG, now on LDISS  - endo (Perlman) consulted, recs appreciated    # hypokalemia:  - monitor BMP  - resolved    #COPD:  - c/w albuterol  - pulm (Jaime), recs appreciated    #GERD:  - c/w carafate and PPI     Patient is medically optimized to return to [facility], had long discussion with  and Dr. Olivas. Will follow up with Dr. Olivas outpatient. Patient signed out to [facility] MD Dr. Paul Merrill    ---  CONSULTANTS:     ---  TIME SPENT:  I, the attending physician, was physically present for the key portions of the evaluation and management (E/M) service provided. The total amount of time spent reviewing the hospital notes, laboratory values, imaging findings, assessing/counseling the patient, discussing with consultant physicians, social work, nursing staff was 51 minutes hair removal not indicated

## 2021-11-11 NOTE — DISCHARGE NOTE PROVIDER - CARE PROVIDER_API CALL
Arturo Olivas; PhD)  Infectious Disease; Internal Medicine  [address] [place] [address]  Phone: [phone]  Fax: [phone]  Follow Up Time:     Paul Merrill  INTERNAL MEDICINE  [address] [place] [address], [place] [address]  Phone: [phone]  Fax: [phone]  Follow Up Time: 1-3 days

## 2021-11-11 NOTE — PROGRESS NOTE ADULT - SUBJECTIVE AND OBJECTIVE BOX
[facility] DIVISION of INFECTIOUS DISEASE  Arturo Olivas MD PhD, Haylee Umanzor MD, Andressa Brantley MD, Billy Valenzuela MD, Emil Medellin MD  and providing coverage with Alexa Canas MD and Eusebio Chairez MD  Providing Infectious Disease Consultations at [facility]    Office# [phone] to schedule follow up appointments  Answering Service for urgent calls or New Consults [phone]  Cell# to text for urgent issues Arturo Olivas [phone]     infectious diseases progress note:    BREA BECKHAM is a 75y y. o. Female patient    No concerning overnight events    Allergies    codeine (Hives)    Intolerances        ANTIBIOTICS/RELEVANT:  antimicrobials    immunologic:    OTHER:  acetaminophen     Tablet .. 650 milliGRAM(s) Oral every 6 hours PRN  acetaminophen    Suspension .. 650 milliGRAM(s) Oral every 6 hours PRN  ALBUTerol    90 MICROgram(s) HFA Inhaler 2 Puff(s) Inhalation every 6 hours  aspirin  chewable 81 milliGRAM(s) Oral daily  chlorhexidine 2% Cloths 1 Application(s) Topical daily  dextrose 40% Gel 15 Gram(s) Oral once  dextrose 5%. 1000 milliLiter(s) IV Continuous <Continuous>  dextrose 5%. 1000 milliLiter(s) IV Continuous <Continuous>  dextrose 50% Injectable 25 Gram(s) IV Push once  dextrose 50% Injectable 12.5 Gram(s) IV Push once  dextrose 50% Injectable 25 Gram(s) IV Push once  glucagon  Injectable 1 milliGRAM(s) IntraMuscular once  heparin   Injectable 5000 Unit(s) SubCutaneous every 8 hours  insulin lispro (ADMELOG) corrective regimen sliding scale   SubCutaneous every 6 hours  lactated ringers. 500 milliLiter(s) IV Continuous <Continuous>  lactobacillus acidophilus 1 Tablet(s) Oral daily  LORazepam     Tablet 1 milliGRAM(s) Oral three times a day  LORazepam   Injectable 2 milliGRAM(s) IV Push every 2 hours PRN  LORazepam   Injectable 2 milliGRAM(s) IV Push once  methocarbamol 250 milliGRAM(s) Oral two times a day  pantoprazole   Suspension 40 milliGRAM(s) Oral daily  polyethylene glycol 3350 17 Gram(s) Oral every 12 hours  senna 2 Tablet(s) Oral at bedtime  simethicone 80 milliGRAM(s) Chew every 6 hours  sucralfate suspension 1 Gram(s) Oral four times a day      Objective:  Vital Signs Last 24 Hrs  T(C): 36.7 (11 Nov 2021 08:23), Max: 37.1 (10 Nov 2021 12:46)  T(F): 98.1 (11 Nov 2021 08:23), Max: 98.8 (10 Nov 2021 23:59)  HR: 74 (11 Nov 2021 08:00) (60 - 83)  BP: 124/60 (11 Nov 2021 08:00) (93/50 - 151/108)  BP(mean): 79 (11 Nov 2021 08:00) (64 - 121)  RR: 16 (11 Nov 2021 08:00) (15 - 46)  SpO2: 99% (11 Nov 2021 08:00) (97% - 100%)    T(C): 36.7 (11-11-21 @ 08:23), Max: 37.1 (11-10-21 @ 12:46)  T(C): 36.7 (11-11-21 @ 08:23), Max: 38.7 (11-08-21 @ 09:09)  T(C): 36.7 (11-11-21 @ 08:23), Max: 38.7 (11-08-21 @ 09:09)    PHYSICAL EXAM:  HEENT: NC atraumatic  Neck: supple, intubated via trach  Respiratory: no accessory muscle use, breathing comfortably  Cardiovascular: distant  Gastrointestinal: normal appearing, nondistended  Extremities: no clubbing, no cyanosis,    Mode: AC/ CMV (Assist Control/ Continuous Mandatory Ventilation), RR (machine): 18, TV (machine): 400, FiO2: 30, PEEP: 5, ITime: 1, MAP: 16, PIP: 37    LABS:                          8.3    5.40  )-----------( 228      ( 11 Nov 2021 08:37 )             27.2       5.40 11-11 @ 08:37  5.50 11-10 @ 06:32  13.41 11-09 @ 05:58  7.80 11-08 @ 06:26  10.59 11-07 @ 06:39  13.77 11-06 @ 23:11      11-10    141  |  105  |  37<H>  ----------------------------<  119<H>  4.3   |  27  |  0.99    Ca    7.7<L>      10 Nov 2021 06:32  Phos  1.5     11-10  Mg     2.3     11-10    TPro  5.3<L>  /  Alb  1.7<L>  /  TBili  0.3  /  DBili  x   /  AST  27  /  ALT  28  /  AlkPhos  75  11-10      Creatinine, Serum: 0.99 mg/dL (11-10-21 @ 06:32)  Creatinine, Serum: 1.36 mg/dL (11-09-21 @ 05:58)  Creatinine, Serum: 1.35 mg/dL (11-08-21 @ 16:38)  Creatinine, Serum: 1.54 mg/dL (11-08-21 @ 06:26)  Creatinine, Serum: 1.68 mg/dL (11-07-21 @ 11:37)  Creatinine, Serum: 1.85 mg/dL (11-07-21 @ 06:39)  Creatinine, Serum: 2.45 mg/dL (11-06-21 @ 23:11)                INFLAMMATORY MARKERS  Auto Lymphocyte #: 1.26 K/uL (11-11-21 @ 08:37)  Auto Neutrophil #: 3.40 K/uL (11-11-21 @ 08:37)  Auto Neutrophil #: 3.56 K/uL (11-10-21 @ 06:32)  Auto Lymphocyte #: 1.23 K/uL (11-10-21 @ 06:32)  Auto Neutrophil #: 6.05 K/uL (11-08-21 @ 06:26)  Auto Lymphocyte #: 1.14 K/uL (11-08-21 @ 06:26)  Auto Lymphocyte #: 2.16 K/uL (11-07-21 @ 06:39)  Auto Neutrophil #: 7.77 K/uL (11-07-21 @ 06:39)  Auto Lymphocyte #: 2.19 K/uL (11-06-21 @ 23:11)  Auto Neutrophil #: 10.60 K/uL (11-06-21 @ 23:11)    Lactate, Blood: 1.6 mmol/L (11-07-21 @ 01:45)  Lactate, Blood: 3.3 mmol/L (11-06-21 @ 23:11)    Auto Eosinophil #: 0.12 K/uL (11-11-21 @ 08:37)  Auto Eosinophil #: 0.23 K/uL (11-10-21 @ 06:32)  Auto Eosinophil #: 0.07 K/uL (11-08-21 @ 06:26)  Auto Eosinophil #: 0.09 K/uL (11-07-21 @ 06:39)  Auto Eosinophil #: 0.09 K/uL (11-06-21 @ 23:11)        Procalcitonin, Serum: 17.60 ng/mL (11-08-21 @ 12:45)                  INR: 1.17 ratio (11-08-21 @ 06:26)  Activated Partial Thromboplastin Time: 42.3 sec (11-06-21 @ 23:11)  INR: 1.16 ratio (11-06-21 @ 23:11)          MICROBIOLOGY:              RADIOLOGY & ADDITIONAL STUDIES:

## 2021-11-11 NOTE — DISCHARGE NOTE PROVIDER - NSDCMRMEDTOKEN_GEN_ALL_CORE_FT
albuterol 90 mcg/inh inhalation aerosol: 2 puff(s) inhaled every 6 hours  aspirin 81 mg oral tablet, chewable: 1 tab(s) by PEG tube once a day  Bacid (LAC) oral tablet: 2 tab(s) by gastrostomy tube once a day  Carafate 1 g/10 mL oral suspension: 10 milliliter(s) by gastrostomy tube 4 times a day (before meals and at bedtime) for 14 days (Started 6/4/21)  chlorhexidine 0.12% mucous membrane liquid: 15 milliliter(s) mucous membrane 2 times a day  heparin: DVT ppx  insulin lispro 100 units/mL injectable solution:  injectable; sliding scale coverage   ipratropium-albuterol 0.5 mg-2.5 mg/3 mL inhalation solution: 3 milliliter(s) inhaled 4 times a day  LORazepam 1 mg oral tablet: 1 tab(s) by gastrostomy tube 3 times a day, As Needed  methocarbamol: 250 milligram(s) by gastrostomy tube 2 times a day  pantoprazole 40 mg oral granule, delayed release: 40 milligram(s) by gastrostomy tube 2 times a day  polyethylene glycol 3350 oral powder for reconstitution: 17 gram(s) orally every 12 hours  senna 8.6 mg oral tablet: 3 tab(s) by gastrostomy tube once a day (at bedtime)  simethicone 80 mg oral tablet, chewable: 1 tab(s) orally every 6 hours  Tylenol 325 mg oral tablet: 2 tab(s) by gastrostomy tube once a day; 60 minutes prior to dressing change

## 2021-11-11 NOTE — DISCHARGE NOTE NURSING/CASE MANAGEMENT/SOCIAL WORK - PATIENT PORTAL LINK FT
You can access the FollowMyHealth Patient Portal offered by [facility] by registering at the following website: [email or web address]. By joining [facility]’s FollowMyHealth portal, you will also be able to view your health information using other applications (apps) compatible with our system.

## 2021-11-11 NOTE — DISCHARGE NOTE PROVIDER - NSDCCPCAREPLAN_GEN_ALL_CORE_FT
PRINCIPAL DISCHARGE DIAGNOSIS  Diagnosis: RYAN (acute kidney injury)  Assessment and Plan of Treatment: Improved with fluids and hydration. Repeat labs at rehab within 1-3 days of discharge      SECONDARY DISCHARGE DIAGNOSES  Diagnosis: Diabetes mellitus type 2, uncontrolled  Assessment and Plan of Treatment: Glucose running lower while initally admitted. Improved with holding Lantus. BG at goal with sliding scale. Restart Lantus at [facility] if bg running higher.    Diagnosis: Elevated WBCs  Assessment and Plan of Treatment: Completed course of antibiotics and midline removed. Seen by Dr. Olivas. From an ID standpoint no further requirement for inpatient status for the management of ID issues. To schedule an outpatient ID follow up appointment please call our office at [phone]    Diagnosis: Drop in hemoglobin  Assessment and Plan of Treatment: Repeat HGB within 1-3 days of discharge     PRINCIPAL DISCHARGE DIAGNOSIS  Diagnosis: RYAN (acute kidney injury)  Assessment and Plan of Treatment: Improved with fluids and hydration. Repeat labs at rehab within 1-3 days of discharge. Continue with gentle hydration at [facility] either via PEG or IV as needed      SECONDARY DISCHARGE DIAGNOSES  Diagnosis: Diabetes mellitus type 2, uncontrolled  Assessment and Plan of Treatment: Glucose running lower while initally admitted. Improved with holding Lantus. BG at goal with sliding scale. Restart Lantus at [facility] if bg running higher.    Diagnosis: Elevated WBCs  Assessment and Plan of Treatment: Completed course of antibiotics and midline removed. Seen by Dr. Olivas. From an ID standpoint no further requirement for inpatient status for the management of ID issues. To schedule an outpatient ID follow up appointment please call Dr. Olivas's office at [phone]    Diagnosis: Drop in hemoglobin  Assessment and Plan of Treatment: Repeat HGB within 1-3 days of discharge

## 2021-11-11 NOTE — PROGRESS NOTE ADULT - ASSESSMENT
74yo F with chronic resp failure - vent dependent, hx of subarachnoid hemorrhage, hx of cardiac arrest, dementia s/p PEG, HTN, DM2 on insulin, hx of CVA, GERD, COPD, admission here from 9/25 to 10/4 for respiratory failure/sepsis possibly 2/2 aspiration vs vent associated PNA who presents from [facility] for RYAN noted on bloodwork, a/w RYAN course c/b hypoglycemia.  # RYAN   - likely pre-renal per discussion with nephro (Barry) given rapid response to IVF  - BUN/Cr improving from 104/2.45   - c/w IVF with D5 1/2 NS at 80cc/hr for now  - monitor BMP  - US kidney/bladder without sign of significant retention (bladder volume ~200cc but not a PVR as pt incontinent and cannot follow command to void)  - brown stool per RN (no sign of UGIB)    #Hypernatremia  will changed IVF to d5 1/2 NS and will repeat in the afternoon.     # leukocytosis:  - 13.7 on admission, now improved  - monitor off Abx per ID (Brendon) recs  - f/up UCx, BCx  - questionable finding on CXR of calcific density;    # hypoglycemia:  - did not receive insulin in this hospital, so likely from lantus pt received in SNF  - hold all insulin for now as pt had hypoglycemia to 30 in the morning and 50 around noon  - c/w tube feeds and added D5 to the IVF   - monitor FSG and will consider adding some insulin when pt stabilizes  - endo (Perlman) consulted, will see pt tomorrow    # hypokalemia:  - repleted with KCl IV and po via PEG  - monitor BMP  - resolved    #COPD:  - c/w albuterol  - pulm (Jaime), recs appreciated    #GERD:  - c/w carafate and PPI     # VTE ppx:  - c/w HSQ.
74yo F with chronic resp failure - vent dependent, hx of subarachnoid hemorrhage, hx of cardiac arrest, dementia s/p PEG, HTN, DM2 on insulin, hx of CVA, GERD, COPD, admission here from 9/25 to 10/4 for respiratory failure/sepsis possibly 2/2 aspiration vs vent associated PNA who presents from [facility] for RYAN noted on bloodwork, a/w RYAN course c/b hypoglycemia.  # RYAN   - likely pre-renal per discussion with nephro (Barry) given rapid response to IVF  - BUN/Cr improving from 104/2.45   - monitor BMP  - US kidney/bladder without sign of significant retention (bladder volume ~200cc but not a PVR as pt incontinent and cannot follow command to void)  - brown stool per RN (no sign of UGIB)    #Hypernatremia  resolved. Trend bmp    # leukocytosis:  - Afebrile, wbc count improved  - continue to monitor off Abx per ID (Brendon) recs  - UCx, BCx- NGTD    # hypoglycemia:  - did not receive insulin in this hospital, so likely from lantus pt received in SNF  - BG stable  - c/w tube feeds  - monitor FSG, now on LDISS  - endo (Perlman) consulted, recs appreciated    # hypokalemia:  - monitor BMP  - resolved    #COPD:  - c/w albuterol  - pulm (Jaime), recs appreciated    #GERD:  - c/w carafate and PPI     # VTE ppx:  - c/w HSQ    Patient is medically optimized to return to [facility], had long discussion with  and Dr. Olivas. Will follow up with Dr. Olivas outpatient. Patient signed out to [facility] MD Dr. Paul Merrill
75F with chronic resp failure - vent dependent, hx of subarachnoid hemorrhage, hx of cardiac arrest, dementia s/p PEG, HTN, DM2 on insulin, hx of CVA, GERD, COPD, admission here from 9/25 to 10/4 for respiratory failure/sepsis possibly 2/2 aspiration vs vent associated PNA who presents from [facility] for abnormal labs.    vs noted  labs reviewed  s/p IVF    manasa -  s/p IVF  serial labs  I and O  vent support  not a candidate for weaning  spoke with   full code  GOC discussion   HCP - Marciano -   oral hygiene  cx in progress  asp prec - HOB elev - 
75F with chronic resp failure - vent dependent, hx of subarachnoid hemorrhage, hx of cardiac arrest, dementia s/p PEG, HTN, DM2 on insulin, hx of CVA, GERD, COPD, admission here from 9/25 to 10/4 for respiratory failure/sepsis possibly 2/2 aspiration vs vent associated PNA who presents from [facility] for abnormal labs.    consider Seroquel night time and psych consult  on Ativan PRN  intermittent agitation - vs noted - on IVF - labs reviewed -     manasa - IVF  serial labs  I and O  vent support  not a candidate for weaning  spoke with   full code  GOC discussion   HCP - Marciano -   oral hygiene  cx in progress  asp prec - HOB elev - 
75F with chronic resp failure - vent dependent, hx of subarachnoid hemorrhage, hx of cardiac arrest, dementia s/p PEG, HTN, DM2 on insulin, hx of CVA, GERD, COPD, admission here from 9/25 to 10/4 for respiratory failure/sepsis possibly 2/2 aspiration vs vent associated PNA who presents from [facility] for abnormal labs.  Patient was found to have an elevated BUN of 42 and then on repeat BUN of 100.  Was sent here for further evaluation.  In the ED, patient's initial triage vitals were BP 97/60, HR 72, RR 18, 100% on vent and T98.5F.  Labs showed leukocytosis of 13.7, BUN/Cr 104/2.45, lactate 3.3.      RECOMMENDATIONS    Pt is well known to me from prior admissions, afebrile, minimal leukocytosis and without eosinopenia no sputum production, yeast in urine not unexpected with vaughn and significant abx exposure, pulm exam not impressive for ronchi or crackles.     11/9 - Noted fevers overnight and elevated wbc , noted elevated procal. Pt certainly at risk of aspiration PNA with repeated aspirations but at this point will recommend observation off antimicrobial Rx.       suspect this admission was driven by a noninfectious etiology. As far as isolation precautions pt is not producing sputum so  isolation precautions per facility protocol.    Long discussion with ICU team and  about plans to avoid unnecessary abx and frequent readmissions. Will get involved in outpt care to facilitate.    From an ID standpoint no further requirement for inpatient status for the management of ID issues. Fine with discharge from ID standpoint when other medical issues no longer require inpatient care and social issues allow for a safe discharge plan. To schedule an outpatient ID follow up appointment please call our office at [phone]      We will follow along in the care of this patient. Please call us at [phone] or text me directly on my cell# at [phone] with any concerns.  
75F with chronic resp failure - vent dependent, hx of subarachnoid hemorrhage, hx of cardiac arrest, dementia s/p PEG, HTN, DM2 on insulin, hx of CVA, GERD, COPD, admission here from 9/25 to 10/4 for respiratory failure/sepsis possibly 2/2 aspiration vs vent associated PNA who presents from [facility] for abnormal labs.  Patient was found to have an elevated BUN of 42 and then on repeat BUN of 100.  Was sent here for further evaluation.  In the ED, patient's initial triage vitals were BP 97/60, HR 72, RR 18, 100% on vent and T98.5F.  Labs showed leukocytosis of 13.7, BUN/Cr 104/2.45, lactate 3.3.      RECOMMENDATIONS    Pt is well known to me from prior admissions, afebrile, minimal leukocytosis and without eosinopenia no sputum production, yeast in urine not unexpected with vaughn and significant abx exposure, pulm exam not impressive for ronchi or crackles.     11/9 - Noted fevers overnight and elevated wbc , noted elevated procal. Pt certainly at risk of aspiration PNA with repeated aspirations but at this point will recommend observation off antimicrobial Rx.       suspect this admission was driven by a noninfectious etiology. As far as isolation precautions pt is not producing sputum so  isolation precautions per facility protocol.    From an ID standpoint no further requirement for inpatient status for the management of ID issues. Fine with discharge from ID standpoint when other medical issues no longer require inpatient care and social issues allow for a safe discharge plan. To schedule an outpatient ID follow up appointment please call our office at [phone]      We will follow along in the care of this patient. Please call us at [phone] or text me directly on my cell# at [phone] with any concerns.  
Patient with dementia and is not responsive.  Collateral information obtained from chart and notes 75F with chronic resp failure - vent dependent, hx of subarachnoid hemorrhage, hx of cardiac arrest, dementia s/p PEG, HTN, DM2 on insulin, hx of CVA, GERD, COPD, admission here from 9/25 to 10/4 for respiratory failure/sepsis possibly 2/2 aspiration vs vent associated PNA who presents from [facility] for abnormal labs.  Unclear as to the reason why labs were done yesterday but patient was found to have an elevated BUN of 42 and then on repeat BUN of 100.  Was sent here for further evaluation.  In the ED, patient's initial triage vitals were BP 97/60, HR 72, RR 18, 100% on vent and T98.5F.  Labs showed leukocytosis of 13.7, BUN/Cr 104/2.45, lactate 3.3.  Patient given 1.8L of NS.  Patient's CT A/P showed no hydronephrosis but showed a calcific density within the right lung which may represent aspirated material (new from previous exam).  Started on vancomycin and zosyn empirically for PNA.  Patient's UA also showed many yeast and was started on fluconazole empirically as well.  Patient's repeat lactate trended down to 1.6 on repeat.  Patient RVP and COVID negative.    Calcific density within the right lung may represent aspirated material. This is new since the previous exam. now with manasa       ACUTE RENAL FAILURE: most likely due to prerenal azotemia , hypovolemic hypotension manasa responding ok  and appropriately  with  slow hydration ,   will f/u with will check ua , urine osmolality , urine sodium , urine uric acid , serum sodium , serum osmolality , serum uric acid , f/u with hyponatremia work up , f/u with bmp , monitor i and o  Serum creatinine is  at  1.36    , approximating GFR at  29  ml/min.   There is no progression . No uremic symptoms  pos  evidence of anemia .  Fluid status stable.  Will continue to avoid nephrotoxic drugs.  Patient remains asymptomatic.   Continue current therapy.  hold  diuretic.  hold   ACE inhibitor.  hold   ARB.  
Patient with dementia and is not responsive.  Collateral information obtained from chart and notes 75F with chronic resp failure - vent dependent, hx of subarachnoid hemorrhage, hx of cardiac arrest, dementia s/p PEG, HTN, DM2 on insulin, hx of CVA, GERD, COPD, admission here from 9/25 to 10/4 for respiratory failure/sepsis possibly 2/2 aspiration vs vent associated PNA who presents from [facility] for abnormal labs.  Unclear as to the reason why labs were done yesterday but patient was found to have an elevated BUN of 42 and then on repeat BUN of 100.  Was sent here for further evaluation.  In the ED, patient's initial triage vitals were BP 97/60, HR 72, RR 18, 100% on vent and T98.5F.  Labs showed leukocytosis of 13.7, BUN/Cr 104/2.45, lactate 3.3.  Patient given 1.8L of NS.  Patient's CT A/P showed no hydronephrosis but showed a calcific density within the right lung which may represent aspirated material (new from previous exam).  Started on vancomycin and zosyn empirically for PNA.  Patient's UA also showed many yeast and was started on fluconazole empirically as well.  Patient's repeat lactate trended down to 1.6 on repeat.  Patient RVP and COVID negative.    Calcific density within the right lung may represent aspirated material. This is new since the previous exam. now with manasa       ACUTE RENAL FAILURE: most likely due to prerenal azotemia , hypovolemic hypotension manasa responding ok  and appropriately  with  slow hydration ,   will f/u with will check ua , urine osmolality , urine sodium , urine uric acid , serum sodium , serum osmolality , serum uric acid , f/u with hyponatremia work up , f/u with bmp , monitor i and o  Serum creatinine is  at  1.36    , approximating GFR at  29  ml/min.   There is no progression . No uremic symptoms  pos  evidence of anemia .  Fluid status stable.  Will continue to avoid nephrotoxic drugs.  Patient remains asymptomatic.   Continue current therapy.  hold  diuretic.  hold   ACE inhibitor.  hold   ARB.  
Patient with dementia and is not responsive.  Collateral information obtained from chart and notes 75F with chronic resp failure - vent dependent, hx of subarachnoid hemorrhage, hx of cardiac arrest, dementia s/p PEG, HTN, DM2 on insulin, hx of CVA, GERD, COPD, admission here from 9/25 to 10/4 for respiratory failure/sepsis possibly 2/2 aspiration vs vent associated PNA who presents from [facility] for abnormal labs.  Unclear as to the reason why labs were done yesterday but patient was found to have an elevated BUN of 42 and then on repeat BUN of 100.  Was sent here for further evaluation.  In the ED, patient's initial triage vitals were BP 97/60, HR 72, RR 18, 100% on vent and T98.5F.  Labs showed leukocytosis of 13.7, BUN/Cr 104/2.45, lactate 3.3.  Patient given 1.8L of NS.  Patient's CT A/P showed no hydronephrosis but showed a calcific density within the right lung which may represent aspirated material (new from previous exam).  Started on vancomycin and zosyn empirically for PNA.  Patient's UA also showed many yeast and was started on fluconazole empirically as well.  Patient's repeat lactate trended down to 1.6 on repeat.  Patient RVP and COVID negative.    Calcific density within the right lung may represent aspirated material. This is new since the previous exam. now with manasa       ACUTE RENAL FAILURE: most likely due to prerenal azotemia , hypovolemic hypotension manasa responding ok  and appropriately  with  slow hydration ,   will f/u with will check ua , urine osmolality , urine sodium , urine uric acid , serum sodium , serum osmolality , serum uric acid , f/u with hyponatremia work up , f/u with bmp , monitor i and o  Serum creatinine is  at  1.68    , approximating GFR at  29  ml/min.   There is no progression . No uremic symptoms  pos  evidence of anemia .  Fluid status stable.  Will continue to avoid nephrotoxic drugs.  Patient remains asymptomatic.   Continue current therapy.  hold  diuretic.  hold   ACE inhibitor.  hold   ARB.  Additional evaluation:   ECG,    echocardiogram,     CXR,  will obtained recent   renal ultrasound to evalaute kidney size and possible stones ,  
The patient is a 75 year old female with a history of HTN, DM, CVA, dementia, chronic respiratory failure s/p trach, PEG, recent cardiac arrest who is admitted with RYAN.    Plan:  - Echo last admission with normal LV systolic function, mild pulm HTN  - Continue IV fluids  - Continue aspirin 81 mg daily - monitor hemoglobin due to recent GI bleed  - Mechanical ventilation  - Overall poor prognosis
The patient is a 75 year old female with a history of HTN, DM, CVA, dementia, chronic respiratory failure s/p trach, PEG, recent cardiac arrest who is admitted with RYAN.    Plan:  - Echo last admission with normal LV systolic function, mild pulm HTN  - Continue IV fluids as needed  - Continue aspirin 81 mg daily - monitor hemoglobin due to recent GI bleed  - Mechanical ventilation  - Overall poor prognosis
The patient is a 75 year old female with a history of HTN, DM, CVA, dementia, chronic respiratory failure s/p trach, PEG, recent cardiac arrest who is admitted with RYAN.    Plan:  - Echo last admission with normal LV systolic function, mild pulm HTN  - Continue IV fluids as needed  - Continue aspirin 81 mg daily - monitor hemoglobin due to recent GI bleed  - Mechanical ventilation  - Overall poor prognosis  - Discharge planning
74yo F with chronic resp failure - vent dependent, hx of subarachnoid hemorrhage, hx of cardiac arrest, dementia s/p PEG, HTN, DM2 on insulin, hx of CVA, GERD, COPD, admission here from 9/25 to 10/4 for respiratory failure/sepsis possibly 2/2 aspiration vs vent associated PNA who presents from [facility] for RYAN noted on bloodwork, a/w RYAN course c/b hypoglycemia.  # RYAN   - likely pre-renal per discussion with nephro (Barry) given rapid response to IVF  - BUN/Cr improving from 104/2.45   - c/w IVF with LR  - monitor BMP  - US kidney/bladder without sign of significant retention (bladder volume ~200cc but not a PVR as pt incontinent and cannot follow command to void)  - brown stool per RN (no sign of UGIB)    #Hypernatremia  resolved after changing IVF to 1/2 NS.  Dc'ed 1/2NS, now LR.     # leukocytosis:  - WNL yesterday, now elevated, with fevers overnight  - continue to monitor off Abx per ID (Brendon) recs  - f/up UCx, BCx  - questionable finding on CXR of calcific density; repeat CXR to be done.     # hypoglycemia:  - did not receive insulin in this hospital, so likely from lantus pt received in SNF  - hold all insulin for now as pt had hypoglycemia to 30 in the morning and 50 around noon  - c/w tube feeds  - monitor FSG, now on LDISS  - endo (Perlman) consulted, recs appreciated    # hypokalemia:  - repleted with KCl IV and po via PEG  - monitor BMP  - resolved    #COPD:  - c/w albuterol  - pulm (Jaime), recs appreciated    #GERD:  - c/w carafate and PPI     # VTE ppx:  - c/w HSQ.
75 year old female PMHx Chronic Respiratory Failure ( Vent dependant s/p Trach / Peg), SAH, Prior Cardiac Arrest with resulting Anoxic Brain Injury, HTN, IDDM2, CVA, GERD, COPD.  Recent hospitalization 9/25-10/4 for respiratory failure / sepsis. Admitted from [facility] 11/7/2021 for Elevated Renal Indicis      UTI   Dehydration   RYAN - Prerenal Azotemia       Added TID oral Ativan 1mg   Ativan 2mg PRN agitation / Vent compliance   Placed on PRVC with lung protective settings Fio2 40%   Vent bundle in place   HDS   TF / PPI   Renal Indicies improving with iraida hydration   Renal following   Watching now off ABX - ID following   F/U Cultures   DVt PPx   Lispro by SS   Case D/W eICU Dr Yen         
75F with chronic resp failure - vent dependent, hx of subarachnoid hemorrhage, hx of cardiac arrest, dementia s/p PEG, HTN, DM2 on insulin, hx of CVA, GERD, COPD, admission here from 9/25 to 10/4 for respiratory failure/sepsis possibly 2/2 aspiration vs vent associated PNA who presents from [facility] for abnormal labs.  Patient was found to have an elevated BUN of 42 and then on repeat BUN of 100.  Was sent here for further evaluation.  In the ED, patient's initial triage vitals were BP 97/60, HR 72, RR 18, 100% on vent and T98.5F.  Labs showed leukocytosis of 13.7, BUN/Cr 104/2.45, lactate 3.3.      RECOMMENDATIONS    Pt is well known to me from prior admissions, afebrile, minimal leukocytosis and without eosinopenia no sputum production, yeast in urine not unexpected with vaughn and significant abx exposure, pulm exam not impressive for ronchi or crackles.     11/8 - Noted fever this am. Pt certainly at risk of aspiration PNA with repeated aspirations but at this point will recommend observation off antimicrobial Rx.     Will follow results of additional testing to clarify but suspect this admission is being driven by a noninfectious etiology. As far as isolation precautions pt is not producing sputum so  isolation precautions per facility protocol.    Noted midline recommend to remove and place new if necessary as pt is difficult for IV access    We will follow along in the care of this patient. Please call us at [phone] or text me directly on my cell# at [phone] with any concerns.  
75F with chronic resp failure - vent dependent, hx of subarachnoid hemorrhage, hx of cardiac arrest, dementia s/p PEG, HTN, DM2 on insulin, hx of CVA, GERD, COPD, admission here from 9/25 to 10/4 for respiratory failure/sepsis possibly 2/2 aspiration vs vent associated PNA who presents from [facility] for abnormal labs.    intermittent agitation - vs noted - on IVF - labs reviewed -     manasa - IVF  serial labs  I and O  vent support  not a candidate for weaning  spoke with   full code  GOC discussion   HCP - Marciano -   oral hygiene  cx in progress  asp prec - HOB elev - 
76yo F with chronic resp failure - vent dependent, hx of subarachnoid hemorrhage, hx of cardiac arrest, dementia s/p PEG, HTN, DM2 on insulin, hx of CVA, GERD, COPD, admission here from 9/25 to 10/4 for respiratory failure/sepsis possibly 2/2 aspiration vs vent associated PNA who presents from [facility] for RYAN noted on bloodwork, a/w RYAN course c/b hypoglycemia.  # RYAN   - likely pre-renal per discussion with nephro (Barry) given rapid response to IVF  - BUN/Cr improving from 104/2.45   - monitor BMP  - US kidney/bladder without sign of significant retention (bladder volume ~200cc but not a PVR as pt incontinent and cannot follow command to void)  - brown stool per RN (no sign of UGIB)    #Hypernatremia  resolved. Trend bmp    # leukocytosis:  - Afebrile, wbc count improved  - continue to monitor off Abx per ID (Brendon) recs  - UCx, BCx- NGTD    # hypoglycemia:  - did not receive insulin in this hospital, so likely from lantus pt received in SNF  - BG stable  - c/w tube feeds  - monitor FSG, now on LDISS  - endo (Perlman) consulted, recs appreciated    # hypokalemia:  - monitor BMP  - resolved    #COPD:  - c/w albuterol  - pulm (Jaime), recs appreciated    #GERD:  - c/w carafate and PPI     # VTE ppx:  - c/w HSQ
Pt is a 74 y/o female with PMHx as above here with RYAN, dehydration, questionable sepsis. Also w/ anemia.     Plan:  - Stable on chronic vent settings, titrating to maintain spo2 >90%.   - Cont routine trach care, suctioning PRN  - Maintain MAP 65-70  - NPO on tube feeds, receiving 300mls FW w/ feeds q8  - Renal function improved, stable, trend, I/Os, replete lytes as needed  - LR @ 50cc/hr  - SPutum w/ few pseudomonas, doesnt appear actively infected, likely colonized. Off abx. Trend WBC/Temp curve.   - Asa, heparin for dvt ppx  - Trend daily cbc, transfuse PRN for HgB <7  - cont s/s, monitor f/s    Dispo: FUll code. 
The patient is a 75 year old female with a history of HTN, DM, CVA, dementia, chronic respiratory failure s/p trach, PEG, recent cardiac arrest who is admitted with RYAN.    Plan:  - Echo last admission with normal LV systolic function, mild pulm HTN  - Continue IV fluids as needed  - Continue aspirin 81 mg daily - monitor hemoglobin due to recent GI bleed  - Mechanical ventilation  - Overall poor prognosis
75F with chronic resp failure - vent dependent, hx of subarachnoid hemorrhage, hx of cardiac arrest, dementia s/p PEG, HTN, DM2 on insulin, hx of CVA, GERD, COPD, admission here from 9/25 to 10/4 for respiratory failure/sepsis possibly 2/2 aspiration vs vent associated PNA who presents from [facility] for abnormal labs.  Patient was found to have an elevated BUN of 42 and then on repeat BUN of 100.  Was sent here for further evaluation.  In the ED, patient's initial triage vitals were BP 97/60, HR 72, RR 18, 100% on vent and T98.5F.  Labs showed leukocytosis of 13.7, BUN/Cr 104/2.45, lactate 3.3.      RECOMMENDATIONS    Pt is well known to me from prior admissions, afebrile, minimal leukocytosis and without eosinopenia no sputum production, yeast in urine not unexpected with vaughn and significant abx exposure, pulm exam not impressive for ronchi or crackles.     11/9 - Noted fevers overnight and now with elevated wbc again, noted elevated procal. Pt certainly at risk of aspiration PNA with repeated aspirations but at this point will recommend observation off antimicrobial Rx.  will order CXR and cbc w diff for am, will follow sputum, would not dc while febrile     Will follow results of additional testing to clarify but suspect this admission is being driven by a noninfectious etiology. As far as isolation precautions pt is not producing sputum so  isolation precautions per facility protocol.    We will follow along in the care of this patient. Please call us at [phone] or text me directly on my cell# at [phone] with any concerns.  
Patient with dementia and is not responsive.  Collateral information obtained from chart and notes 75F with chronic resp failure - vent dependent, hx of subarachnoid hemorrhage, hx of cardiac arrest, dementia s/p PEG, HTN, DM2 on insulin, hx of CVA, GERD, COPD, admission here from 9/25 to 10/4 for respiratory failure/sepsis possibly 2/2 aspiration vs vent associated PNA who presents from [facility] for abnormal labs.  Unclear as to the reason why labs were done yesterday but patient was found to have an elevated BUN of 42 and then on repeat BUN of 100.  Was sent here for further evaluation.  In the ED, patient's initial triage vitals were BP 97/60, HR 72, RR 18, 100% on vent and T98.5F.  Labs showed leukocytosis of 13.7, BUN/Cr 104/2.45, lactate 3.3.  Patient given 1.8L of NS.  Patient's CT A/P showed no hydronephrosis but showed a calcific density within the right lung which may represent aspirated material (new from previous exam).  Started on vancomycin and zosyn empirically for PNA.  Patient's UA also showed many yeast and was started on fluconazole empirically as well.  Patient's repeat lactate trended down to 1.6 on repeat.  Patient RVP and COVID negative.    Calcific density within the right lung may represent aspirated material. This is new since the previous exam. now with manasa       ACUTE RENAL FAILURE: most likely due to prerenal azotemia , hypovolemic hypotension manasa responding ok  and appropriately  with  slow hydration ,   will f/u with will check ua , urine osmolality , urine sodium , urine uric acid , serum sodium , serum osmolality , serum uric acid , f/u with hyponatremia work up , f/u with bmp , monitor i and o  Serum creatinine is  improving   There is no progression . No uremic symptoms  
    CHAPINCITO GUIDRY 75 f [facility] S 11/7/2021   DR ILIANA KEMP     REVIEW OF SYMPTOMS      Able to give (reliable) ROS  NO     PHYSICAL EXAM    HEENT Unremarkable  atraumatic   RESP Fair air entry EXP prolonged    Harsh breath sound Resp distres mild   CARDIAC S1 S2 No S3     NO JVD    ABDOMEN SOFT BS PRESENT NOT DISTENDED No hepatosplenomegaly   PEDAL EDEMA present No calf tenderness  NO rash             PATIENT PRESENTATION/COURSE.  75F with chronic resp failure vent dependent, hx of subarachnoid hemorrhage, anoxic encephalopathy hx of cardiac arrest, dementia sp trach s/p PEG, HTN, DM2 on insulin, hx of CVA, GERD, COPD, recently  admitted 9/7-9/15 sp cac asp pneu  Rxed with zosyn       Readmission here from 9/25 to 10/4 for respiratory failure/sepsis possibly 2/2 aspiration vs vent associated PNA who presentsed from [facility] for abnormal labs.    Pt started on IV fluconazole vanco cefepime by house md at admission 11/7/2021 for suspected aspn pneum  Pulm icu consulted 11/7/2021     Patient was found to have an elevated BUN of 42 and then on repeat BUN of 100.  Was sent here for further evaluation.  In the ED, patient's initial triage vitals were BP 97/60, HR 72, RR 18, 100% on vent and T98.5F.  Labs showed leukocytosis of 13.7, BUN/Cr 104/2.45, lactate 3.3.          PAST AUGUST.     9/28/2021 sputum 9/28 showed crp   9/26 sp stenotrophomonas pseucomonas serratia  9/8/2021 ECHO n lvsf mild dd pasp 51                                    PROBLEMS/ISSUES.  GOC.  Full code   COVID STATUS.   11/7/2021 scv2 (-)   11/8/2021 spk ab (+) 250     VDRF pmh  LACTICEMIA poa   ASPIRATION PNEUMONIA poa  NEW DENSITY rll 11/7/2021 CT CH   ANEMIA normo poa.   HYPOGLYCEMIA 11/7/2021 G 29  RYAN 11/7/2021 Cr 1.8     VDRF pmh  TRACH pmh  PEG pmh   DM pmh  ANOXIC ENCEPHALOPATHY pmh      INTERVAL EVENTS.    11/8/2021 w 7.8 Hb 7.6 inr  117   11/8/2021 Na 146 Cr 1.3   11/7/2021 w 10 Hb 8.8   11/7/2021 g 29  11/7/2021 Na 139 K 3.2 CO2 30 Cr 1.8    PATIENT DATA   VITALS/PO/IO/VENT/DRIPS.     11/8/2021 100 78 100/57   11/8/2021 prvc 18/400/5/.4       BEST PRACTICE ISSUES.                                                  HEAD OF BED ELEVATION. Yes  DVT PROPHYLAXIS.          11/7/2021 HPSC                                  SQUIRES PROPHYLAXIS.    11/7/2021 protonix 40   11/7/2021 carafate                                                                                        DIET.      11/7/2021 GLUCERNA 1.5 1200                       INFECTION PROPHYLAXIS.    11/7/2021 CHLORHEXIDINE .12%    GENERAL ISSUES  GOC ADVANCED DIRECTIVE.                                         ALLERGY.  codeine                          WEIGHT.      11/7/2021 56                              BMI.                 : 20     ASSESSMENT/RECOMMENDATIONS.      VDRF pmh  Continue vent   vent bundle   HEMODYNAMICS.   9/8/2021 ECHO n lvsf mild dd pasp 51  target map 65 (+)    hemodynamics stable   LACTICEMIA poa   11/6-11/7 la 3.3-1.6  COPD.  11/7/2021 albuterol  11/7/2021 duoneb   ASPIRATION PNEUMONIA poa  w 11/7-11/8/2021 w 10- 7.8  11/7/2021 One dose diflucan vanco zosyn given initially  11/7/2021 ID Dr Mcpherson recommended to defer abio   NEW DENSITY rll 11/7/2021 CT CH   11/7/2021 ct ch cw 9/25/2021   Basal atelect  Calcific density material rll new possibly aspirated material   Prev bl patchy opac perihilar almost resolved   Air dilated esophags nsc   Monitor serial imaging   CHF  11/8/2021 bnp 4474   9/8/2021 ECHO n lvsf mild dd pasp 51  monitor   CAD.  11/7/2021 ASA 81  ANEMIA normo poa.   Hb 10/3-11/7-11/8/2021 Hb 8.4-8.8- 7.6  monitor  HYPOGLYCEMIA poa  11/7/2021 G 29  G 11/8/2021 229   11/7/2021 Consulted Dr Perlman   Monitor  Check cortisol  HYPERNATREMIA.   Na 11/8/2021 Na 146   11/8/2021 1/2 ns 80   Monitor   RYAN poa  10/3-11/6-11/7-11/8/2021 Cr 0.9-2.4-1.8 - 1.3  11/8/2021 1.2 ns 80   Monitor     TIME SPENT   Over 36 minutes aggregate critical care time spent on encounter; activities included   direct patient care, counseling and/or coordinating care reviewing notes, lab data/ imaging , discussion with multidisciplinary team/ patient  /family and explaining in detail risks, benefits, alternatives  of the recommendations     CHAPINCITO GUIDRY 75 [address] [facility] S 11/7/2021   DR ILIANA KEMP     
    CHAPINCITO GUIDRY 75 f [facility] S 11/7/2021   DR ILIANA KEMP     REVIEW OF SYMPTOMS      Able to give (reliable) ROS  NO     PHYSICAL EXAM    HEENT Unremarkable  atraumatic   RESP Fair air entry EXP prolonged    Harsh breath sound Resp distres mild   CARDIAC S1 S2 No S3     NO JVD    ABDOMEN SOFT BS PRESENT NOT DISTENDED No hepatosplenomegaly   PEDAL EDEMA present No calf tenderness  NO rash           PATIENT PRESENTATION/COURSE.  75F with chronic resp failure vent dependent, hx of subarachnoid hemorrhage, anoxic encephalopathy hx of cardiac arrest, dementia sp trach s/p PEG, HTN, DM2 on insulin, hx of CVA, GERD, COPD, recently  admitted 9/7-9/15 sp cac asp pneu  Rxed with zosyn       Readmission here from 9/25 to 10/4 for respiratory failure/sepsis possibly 2/2 aspiration vs vent associated PNA who presentsed from [facility] for abnormal labs.    Pt started on IV fluconazole vanco cefepime by house md at admission 11/7/2021 for suspected aspn pneum  Pulm icu consulted 11/7/2021     Patient was found to have an elevated BUN of 42 and then on repeat BUN of 100.  Was sent here for further evaluation.  In the ED, patient's initial triage vitals were BP 97/60, HR 72, RR 18, 100% on vent and T98.5F.  Labs showed leukocytosis of 13.7, BUN/Cr 104/2.45, lactate 3.3.          PAST AUGUST.     9/28/2021 sputum 9/28 showed crp   9/26 sp stenotrophomonas pseucomonas serratia  9/8/2021 ECHO n lvsf mild dd pasp 51                                    PROBLEMS/ISSUES.  GO.  Full code   COVID STATUS.   11/7/2021 scv2 (-)   11/8/2021 spk ab (+) 250     VDRF pmh  LACTICEMIA poa   ASPIRATION PNEUMONIA poa  NEW DENSITY rll 11/7/2021 CT CH   ANEMIA normo poa.   HYPOGLYCEMIA 11/7/2021 G 29  RYAN 11/7/2021 Cr 1.8     VDRF pmh  TRACH pmh  PEG pmh   DM pmh  ANOXIC ENCEPHALOPATHY pmh    INTERVAL EVENTS.    11/11/2021 w 5.4 Hb 8.3 Na 139 Cr .9     PATIENT DATA   VITALS/PO/IO/VENT/DRIPS.     11/11/2021 afeb 97 100/60   11/11/2021 ac 18/400/5/.3       BEST PRACTICE ISSUES.                                                  HEAD OF BED ELEVATION. Yes  DVT PROPHYLAXIS.          11/7/2021 HPSC                                  SQUIRES PROPHYLAXIS.    11/7/2021 protonix 40   11/7/2021 carafate                                                                                        DIET.      11/7/2021 GLUCERNA 1.5 1200                       INFECTION PROPHYLAXIS.    11/7/2021 CHLORHEXIDINE .12%    GENERAL ISSUES  GOC ADVANCED DIRECTIVE.                                         ALLERGY.  codeine                          WEIGHT.      11/7/2021 56                              BMI.                 : 20                            ASSESSMENT/RECOMMENDATIONS.      VDRF pmh  Continue vent   vent bundle   HEMODYNAMICS.   9/8/2021 ECHO n lvsf mild dd pasp 51  target map 65 (+)    hemodynamics stable   LACTICEMIA poa   11/6-11/7 la 3.3-1.6  COPD.  11/7/2021 albuterol  11/7/2021 duoneb   ASPIRATION PNEUMONIA poa  w 11/7-11/8/2021 w 10- 7.8  11/7 bl;od c (-)   11/6 rvp (-)   11/7/2021 One dose diflucan vanco zosyn given initially  11/7/2021 ID Dr Mcphreson recommended to defer abio   NEW DENSITY rll 11/7/2021 CT CH   11/7/2021 ct ch cw 9/25/2021   Basal atelect  Calcific density material rll new possibly aspirated material   Prev bl patchy opac perihilar almost resolved   Air dilated esophags nsc   Monitor serial imaging   CHF  11/8/2021 bnp 4474   9/8/2021 ECHO n lvsf mild dd pasp 51  monitor   CAD.  11/7/2021 ASA 81  ANEMIA normo poa.   Hb 10/3-11/7-11/8-11/11/2021 Hb 8.4-8.8- 7.6-8.3  monitor  HYPOGLYCEMIA poa  11/7/2021 G 29  G 11/8/2021 229   11/7/2021 Consulted Dr Perlman   Monitor  Hypogycemia resolvd   HYPERNATREMIA.   Na 11/8-11/11/2021 Na 146- 139   11/8/2021 1/2 ns 80   Monitor   RYAN poa  10/3-11/6-11/7-11/8-11/11/2021 Cr 0.9-2.4-1.8 - 1.3-.9  11/8/2021 1.2 ns 80   Monitor     TIME SPENT   Over 36 minutes aggregate critical care time spent on encounter; activities included   direct patient care, counseling and/or coordinating care reviewing notes, lab data/ imaging , discussion with multidisciplinary team/ patient  /family and explaining in detail risks, benefits, alternatives  of the recommendations     CHAPINCITO GUIDRY 75 f [facility] S 11/7/2021   DR ILIANA KEMP     
    CHAPINCITO GUIDRY 75 f [facility] S 11/7/2021   DR ILIANA KEMP     REVIEW OF SYMPTOMS      Able to give (reliable) ROS  NO     PHYSICAL EXAM    HEENT Unremarkable  atraumatic   RESP Fair air entry EXP prolonged    Harsh breath sound Resp distres mild   CARDIAC S1 S2 No S3     NO JVD    ABDOMEN SOFT BS PRESENT NOT DISTENDED No hepatosplenomegaly   PEDAL EDEMA present No calf tenderness  NO rash             PATIENT PRESENTATION/COURSE.  75F with chronic resp failure vent dependent, hx of subarachnoid hemorrhage, anoxic encephalopathy hx of cardiac arrest, dementia sp trach s/p PEG, HTN, DM2 on insulin, hx of CVA, GERD, COPD, recently  admitted 9/7-9/15 sp cac asp pneu  Rxed with zosyn       Readmission here from 9/25 to 10/4 for respiratory failure/sepsis possibly 2/2 aspiration vs vent associated PNA who presentsed from [facility] for abnormal labs.    Pt started on IV fluconazole vanco cefepime by house md at admission 11/7/2021 for suspected aspn pneum  Pulm icu consulted 11/7/2021     Patient was found to have an elevated BUN of 42 and then on repeat BUN of 100.  Was sent here for further evaluation.  In the ED, patient's initial triage vitals were BP 97/60, HR 72, RR 18, 100% on vent and T98.5F.  Labs showed leukocytosis of 13.7, BUN/Cr 104/2.45, lactate 3.3.          PAST AUGUST.     9/28/2021 sputum 9/28 showed crp   9/26 sp stenotrophomonas pseucomonas serratia  9/8/2021 ECHO n lvsf mild dd pasp 51                                    PROBLEMS/ISSUES.  GO.  Full code   COVID STATUS.   11/7/2021 scv2 (-)   11/8/2021 spk ab (+) 250     VDRF pmh  LACTICEMIA poa   ASPIRATION PNEUMONIA poa  NEW DENSITY rll 11/7/2021 CT CH   ANEMIA normo poa.   HYPOGLYCEMIA 11/7/2021 G 29  RYAN 11/7/2021 Cr 1.8     VDRF pmh  TRACH pmh  PEG pmh   DM pmh  ANOXIC ENCEPHALOPATHY pmh      INTERVAL EVENTS.    11/9/2021 w 13 Hb 7.8 Na 140 Cr 1.3     PATIENT DATA   VITALS/PO/IO/VENT/DRIPS.     11/9/2021 afeb 72 120/80   11/9/2021 prvc 18/400/5/.4       BEST PRACTICE ISSUES.                                                  HEAD OF BED ELEVATION. Yes  DVT PROPHYLAXIS.          11/7/2021 HPSC                                  SQUIRES PROPHYLAXIS.    11/7/2021 protonix 40   11/7/2021 carafate                                                                                        DIET.      11/7/2021 GLUCERNA 1.5 1200                       INFECTION PROPHYLAXIS.    11/7/2021 CHLORHEXIDINE .12%    GENERAL ISSUES  GOC ADVANCED DIRECTIVE.                                         ALLERGY.  codeine                          WEIGHT.      11/7/2021 56                              BMI.                 : 20                            ASSESSMENT/RECOMMENDATIONS.      VDRF pmh  Continue vent   vent bundle   HEMODYNAMICS.   9/8/2021 ECHO n lvsf mild dd pasp 51  target map 65 (+)    hemodynamics stable   LACTICEMIA poa   11/6-11/7 la 3.3-1.6  COPD.  11/7/2021 albuterol  11/7/2021 duoneb   ASPIRATION PNEUMONIA poa  w 11/7-11/8/2021 w 10- 7.8  11/7 bl;od c (-)   11/6 rvp (-)   11/7/2021 One dose diflucan vanco zosyn given initially  11/7/2021 ID Dr Mcpherson recommended to defer abio   NEW DENSITY rll 11/7/2021 CT CH   11/7/2021 ct ch cw 9/25/2021   Basal atelect  Calcific density material rll new possibly aspirated material   Prev bl patchy opac perihilar almost resolved   Air dilated esophags nsc   Monitor serial imaging   CHF  11/8/2021 bnp 4474   9/8/2021 ECHO n lvsf mild dd pasp 51  monitor   CAD.  11/7/2021 ASA 81  ANEMIA normo poa.   Hb 10/3-11/7-11/8/2021 Hb 8.4-8.8- 7.6  monitor  HYPOGLYCEMIA poa  11/7/2021 G 29  G 11/8/2021 229   11/7/2021 Consulted Dr Perlman   Monitor  Check cortisol  HYPERNATREMIA.   Na 11/8/2021 Na 146   11/8/2021 1/2 ns 80   Monitor   RYAN poa  10/3-11/6-11/7-11/8/2021 Cr 0.9-2.4-1.8 - 1.3  11/8/2021 1.2 ns 80   Monitor     TIME SPENT   Over 36 minutes aggregate critical care time spent on encounter; activities included   direct patient care, counseling and/or coordinating care reviewing notes, lab data/ imaging , discussion with multidisciplinary team/ patient  /family and explaining in detail risks, benefits, alternatives  of the recommendations     CHAPINCITO GUIDRY 75 f [facility] S 11/7/2021   DR ILIANA KEMP     
    CHAPINCITO GUIDRY 75 f [facility] S 11/7/2021   DR ILIANA KEMP     REVIEW OF SYMPTOMS      Able to give (reliable) ROS  NO     PHYSICAL EXAM    HEENT Unremarkable  atraumatic   RESP Fair air entry EXP prolonged    Harsh breath sound Resp distres mild   CARDIAC S1 S2 No S3     NO JVD    ABDOMEN SOFT BS PRESENT NOT DISTENDED No hepatosplenomegaly   PEDAL EDEMA present No calf tenderness  NO rash             PATIENT PRESENTATION/COURSE.  75F with chronic resp failure vent dependent, hx of subarachnoid hemorrhage, anoxic encephalopathy hx of cardiac arrest, dementia sp trach s/p PEG, HTN, DM2 on insulin, hx of CVA, GERD, COPD, recently  admitted 9/7-9/15 sp cac asp pneu  Rxed with zosyn       Readmission here from 9/25 to 10/4 for respiratory failure/sepsis possibly 2/2 aspiration vs vent associated PNA who presentsed from [facility] for abnormal labs.    Pt started on IV fluconazole vanco cefepime by house md at admission 11/7/2021 for suspected aspn pneum  Pulm icu consulted 11/7/2021     Patient was found to have an elevated BUN of 42 and then on repeat BUN of 100.  Was sent here for further evaluation.  In the ED, patient's initial triage vitals were BP 97/60, HR 72, RR 18, 100% on vent and T98.5F.  Labs showed leukocytosis of 13.7, BUN/Cr 104/2.45, lactate 3.3.          PAST AUGUST.     9/28/2021 sputum 9/28 showed crp   9/26 sp stenotrophomonas pseucomonas serratia  9/8/2021 ECHO n lvsf mild dd pasp 51                                    PROBLEMS/ISSUES.  GO.  Full code   COVID STATUS.   11/7/2021 scv2 (-)   11/8/2021 spk ab (+) 250     VDRF pmh  LACTICEMIA poa   ASPIRATION PNEUMONIA poa  NEW DENSITY rll 11/7/2021 CT CH   ANEMIA normo poa.   HYPOGLYCEMIA 11/7/2021 G 29  RYAN 11/7/2021 Cr 1.8     VDRF pmh  TRACH pmh  PEG pmh   DM pmh  ANOXIC ENCEPHALOPATHY pmh      INTERVAL EVENTS.    11/10/2021 w 5.5 Hb 7.8 Na 141 Cr .9     PATIENT DATA   VITALS/PO/IO/VENT/DRIPS.     11/10/2021 afeb 67 120/40   11/10/2021 prvc 18/400/5/.4         BEST PRACTICE ISSUES.                                                  HEAD OF BED ELEVATION. Yes  DVT PROPHYLAXIS.          11/7/2021 HPSC                                  SQUIRES PROPHYLAXIS.    11/7/2021 protonix 40   11/7/2021 carafate                                                                                        DIET.      11/7/2021 GLUCERNA 1.5 1200                       INFECTION PROPHYLAXIS.    11/7/2021 CHLORHEXIDINE .12%    GENERAL ISSUES  GOC ADVANCED DIRECTIVE.                                         ALLERGY.  codeine                          WEIGHT.      11/7/2021 56                              BMI.                 : 20                            ASSESSMENT/RECOMMENDATIONS.      VDRF pmh  Continue vent   vent bundle   HEMODYNAMICS.   9/8/2021 ECHO n lvsf mild dd pasp 51  target map 65 (+)    hemodynamics stable   LACTICEMIA poa   11/6-11/7 la 3.3-1.6  COPD.  11/7/2021 albuterol  11/7/2021 duoneb   ASPIRATION PNEUMONIA poa  w 11/7-11/8/2021 w 10- 7.8  11/7 bl;od c (-)   11/6 rvp (-)   11/7/2021 One dose diflucan vanco zosyn given initially  11/7/2021 ID Dr Mcpherson recommended to defer abio   NEW DENSITY rll 11/7/2021 CT CH   11/7/2021 ct ch cw 9/25/2021   Basal atelect  Calcific density material rll new possibly aspirated material   Prev bl patchy opac perihilar almost resolved   Air dilated esophags nsc   Monitor serial imaging   CHF  11/8/2021 bnp 4474   9/8/2021 ECHO n lvsf mild dd pasp 51  monitor   CAD.  11/7/2021 ASA 81  ANEMIA normo poa.   Hb 10/3-11/7-11/8/2021 Hb 8.4-8.8- 7.6  monitor  HYPOGLYCEMIA poa  11/7/2021 G 29  G 11/8/2021 229   11/7/2021 Consulted Dr Perlman   Monitor  Check cortisol  HYPERNATREMIA.   Na 11/8/2021 Na 146   11/8/2021 1/2 ns 80   Monitor   RYAN poa  10/3-11/6-11/7-11/8/2021 Cr 0.9-2.4-1.8 - 1.3  11/8/2021 1.2 ns 80   Monitor     TIME SPENT   Over 36 minutes aggregate critical care time spent on encounter; activities included   direct patient care, counseling and/or coordinating care reviewing notes, lab data/ imaging , discussion with multidisciplinary team/ patient  /family and explaining in detail risks, benefits, alternatives  of the recommendations     CHAPINCITO GUIDRY 75 f [facility] S 11/7/2021   DR ILIANA KEMP     
74 y/o F w/ pmh of htn, dm, CVA, dementia, recent hx of cardiac arrest at [facility], chronic resp failure s/p trach/peg presented [facility] 11/[facility] from SNF for abnormal labs, pt was found to have RYAN, dehydration possible UTI and asp vs vent associated pna.    -Neuro: cont Ativan/fentalyn prn for agitation and vent compliance  -Cardiac: HDS stable no acute issues maintain MAP >65  -Resp: Chronic resp failure w/ pleural effusions and atelectasis unchanged on CT can cont lung protective ventilation will titrate to maintain o2 >90%  -GI: cont TF as ordered/tolerated w/ bowel regimen and protonix for GI ppx  -Renal: RYAN now improving likely prerenal 2/2 to dehydration improving with IVF cont to trend along with lytes  -ID: Pna questionable sputum and blood cx neg, f/u on fungitell and UCX results, wbc and procal elevated if wbc continues to increase will need to restart IVAB will defer to ID for IVAB  -Endo: DM2 cont ISS  to maintain FS b/w 140-180  -Heme: DVT ppx w/ heparin  -Dispo: Pt remains in the [facility] currently full code, dispo pending hospital course
75F with chronic resp failure - vent dependent, hx of subarachnoid hemorrhage, hx of cardiac arrest, dementia s/p PEG, HTN, DM2 on insulin, hx of CVA, GERD, COPD, admission here from 9/25 to 10/4 for respiratory failure/sepsis possibly 2/2 aspiration vs vent associated PNA who presents from [facility] for abnormal labs.    consider Seroquel night time and psych consult    intermittent agitation - vs noted - on IVF - labs reviewed -     manasa - IVF  serial labs  I and O  vent support  not a candidate for weaning  spoke with   full code  GOC discussion   HCP - Marciano -   oral hygiene  cx in progress  asp prec - HOB elev -   
75 year old female PMHx Chronic Respiratory Failure ( Vent dependant s/p Trach / Peg), SAH, Prior Cardiac Arrest with resulting Anoxic Brain Injury, HTN, IDDM2, CVA, GERD, COPD.  Recent hospitalization 9/25-10/4 for respiratory failure / sepsis. Admitted from [facility] 11/7/2021 for Elevated Renal Indicis      UTI   Dehydration   RYNA - Prerenal Azotemia     Plan:  Ativan 2mg PRN agitation / Vent compliance   Continue full ventilator support. Doubt Current PNA at present. Daily Pressure Support trials as tolerated.   Vent bundle in place   TF / PPI   Renal function improving with gentle hydration   Renal following   Watching now off ABX - ID following   F/U Cultures   DVt PPx   Lispro by SS

## 2021-11-11 NOTE — PROGRESS NOTE ADULT - NUTRITIONAL ASSESSMENT
MEDICATIONS  (STANDING):  ALBUTerol    90 MICROgram(s) HFA Inhaler 2 Puff(s) Inhalation every 6 hours  aspirin  chewable 81 milliGRAM(s) Oral daily  chlorhexidine 2% Cloths 1 Application(s) Topical daily  dextrose 40% Gel 15 Gram(s) Oral once  dextrose 5%. 1000 milliLiter(s) (50 mL/Hr) IV Continuous <Continuous>  dextrose 5%. 1000 milliLiter(s) (100 mL/Hr) IV Continuous <Continuous>  dextrose 50% Injectable 25 Gram(s) IV Push once  dextrose 50% Injectable 12.5 Gram(s) IV Push once  dextrose 50% Injectable 25 Gram(s) IV Push once  glucagon  Injectable 1 milliGRAM(s) IntraMuscular once  heparin   Injectable 5000 Unit(s) SubCutaneous every 8 hours  insulin lispro (ADMELOG) corrective regimen sliding scale   SubCutaneous every 6 hours  lactated ringers. 500 milliLiter(s) (50 mL/Hr) IV Continuous <Continuous>  lactated ringers. 500 milliLiter(s) (50 mL/Hr) IV Continuous <Continuous>  lactobacillus acidophilus 1 Tablet(s) Oral daily  LORazepam     Tablet 1 milliGRAM(s) Oral three times a day  LORazepam   Injectable 2 milliGRAM(s) IV Push once  methocarbamol 250 milliGRAM(s) Oral two times a day  pantoprazole   Suspension 40 milliGRAM(s) Oral daily  polyethylene glycol 3350 17 Gram(s) Oral every 12 hours  senna 2 Tablet(s) Oral at bedtime  simethicone 80 milliGRAM(s) Chew every 6 hours  sucralfate suspension 1 Gram(s) Oral four times a day
MEDICATIONS  (STANDING):  ALBUTerol    90 MICROgram(s) HFA Inhaler 2 Puff(s) Inhalation every 6 hours  aspirin  chewable 81 milliGRAM(s) Oral daily  chlorhexidine 0.12% Liquid 15 milliLiter(s) Oral Mucosa every 12 hours  dextrose 40% Gel 15 Gram(s) Oral once  dextrose 5%. 1000 milliLiter(s) (50 mL/Hr) IV Continuous <Continuous>  dextrose 5%. 1000 milliLiter(s) (100 mL/Hr) IV Continuous <Continuous>  dextrose 50% Injectable 25 Gram(s) IV Push once  dextrose 50% Injectable 12.5 Gram(s) IV Push once  dextrose 50% Injectable 25 Gram(s) IV Push once  glucagon  Injectable 1 milliGRAM(s) IntraMuscular once  heparin   Injectable 5000 Unit(s) SubCutaneous every 8 hours  lactobacillus acidophilus 1 Tablet(s) Oral daily  LORazepam     Tablet 1 milliGRAM(s) Oral three times a day  LORazepam   Injectable 2 milliGRAM(s) IV Push once  methocarbamol 250 milliGRAM(s) Oral two times a day  pantoprazole   Suspension 40 milliGRAM(s) Oral daily  polyethylene glycol 3350 17 Gram(s) Oral every 12 hours  senna 2 Tablet(s) Oral at bedtime  simethicone 80 milliGRAM(s) Chew every 6 hours  sodium chloride 0.45%. 1000 milliLiter(s) (80 mL/Hr) IV Continuous <Continuous>  sucralfate suspension 1 Gram(s) Oral four times a day

## 2021-11-11 NOTE — PROGRESS NOTE ADULT - SUBJECTIVE AND OBJECTIVE BOX
Chief Complaint: Abnormal labs    Interval Events: No events overnight.    Review of Systems:  Unable to obtain    Physical Exam:  Vital Signs Last 24 Hrs  T(C): 36.7 (11 Nov 2021 08:23), Max: 37.1 (10 Nov 2021 12:46)  T(F): 98.1 (11 Nov 2021 08:23), Max: 98.8 (10 Nov 2021 23:59)  HR: 74 (11 Nov 2021 08:00) (60 - 83)  BP: 124/60 (11 Nov 2021 08:00) (93/50 - 151/108)  BP(mean): 79 (11 Nov 2021 08:00) (64 - 121)  RR: 16 (11 Nov 2021 08:00) (15 - 46)  SpO2: 99% (11 Nov 2021 08:00) (97% - 100%)  General: NAD  HEENT: Trach  Neck: No JVD, no carotid bruit  Lungs: CTAB  CV: RRR, nl S1/S2, no M/R/G  Abdomen: S/NT/ND, +BS  Extremities: No LE edema, no cyanosis  Neuro: AAOx0  Skin: No rash    Labs:    11-11    139  |  106  |  29<H>  ----------------------------<  171<H>  4.4   |  25  |  0.91    Ca    8.8      11 Nov 2021 08:37  Phos  2.2     11-11  Mg     2.0     11-11    TPro  6.6  /  Alb  2.0<L>  /  TBili  0.3  /  DBili  x   /  AST  21  /  ALT  28  /  AlkPhos  97  11-11                        8.3    5.40  )-----------( 228      ( 11 Nov 2021 08:37 )             27.2       Telemetry: Sinus rhythm

## 2021-11-11 NOTE — PROGRESS NOTE ADULT - SUBJECTIVE AND OBJECTIVE BOX
BREA BECKHAM    [facility]    Allergies    codeine (Hives)    Intolerances        PAST MEDICAL & SURGICAL HISTORY:  Dementia of frontal lobe type    Aphasic stroke    Diabetes mellitus    Respiratory failure    Hypertension    GERD (gastroesophageal reflux disease)    Constipation    Respiratory failure    CVA (cerebral vascular accident)    HTN (hypertension)    DM (diabetes mellitus)    Advanced dementia    COVID-19 virus detected    Quadriplegia    Pneumonia    Type II diabetes mellitus    Hx of appendectomy    Gastrostomy in place    Tracheostomy in place    Tracheostomy tube present    Feeding by G-tube        FAMILY HISTORY:      Home Medications:  acetaminophen 160 mg/5 mL oral suspension: 20.31 milliliter(s) by gastrostomy tube every 6 hours, As Needed (07 Nov 2021 00:03)  albuterol 90 mcg/inh inhalation aerosol: 2 puff(s) inhaled every 6 hours (07 Nov 2021 00:03)  aspirin 81 mg oral tablet, chewable: 1 tab(s) by PEG tube once a day (07 Nov 2021 00:03)  Bacid (LAC) oral tablet: 2 tab(s) by gastrostomy tube once a day (07 Nov 2021 00:03)  Carafate 1 g/10 mL oral suspension: 10 milliliter(s) by gastrostomy tube 4 times a day (before meals and at bedtime) for 14 days (Started 6/4/21) (07 Nov 2021 00:03)  chlorhexidine 0.12% mucous membrane liquid: 15 milliliter(s) mucous membrane 2 times a day (07 Nov 2021 00:03)  insulin glargine: 36 unit(s) subcutaneous once a day (at bedtime) (07 Nov 2021 00:03)  insulin lispro 100 units/mL injectable solution:  injectable; sliding scale coverage  (07 Nov 2021 00:03)  ipratropium-albuterol 0.5 mg-2.5 mg/3 mL inhalation solution: 3 milliliter(s) inhaled 4 times a day (07 Nov 2021 00:03)  LORazepam 1 mg oral tablet: 1 tab(s) by gastrostomy tube 3 times a day, As Needed (07 Nov 2021 01:48)  methocarbamol: 250 milligram(s) by gastrostomy tube 2 times a day (07 Nov 2021 00:03)  pantoprazole 40 mg oral granule, delayed release: 40 milligram(s) by gastrostomy tube 2 times a day (07 Nov 2021 00:03)  polyethylene glycol 3350 oral powder for reconstitution: 17 gram(s) orally every 12 hours (07 Nov 2021 00:03)  senna 8.6 mg oral tablet: 3 tab(s) by gastrostomy tube once a day (at bedtime) (07 Nov 2021 01:48)  simethicone 80 mg oral tablet, chewable: 1 tab(s) orally every 6 hours (07 Nov 2021 00:03)  Tylenol 325 mg oral tablet: 2 tab(s) by gastrostomy tube once a day; 60 minutes prior to dressing change  (07 Nov 2021 01:48)      MEDICATIONS  (STANDING):  ALBUTerol    90 MICROgram(s) HFA Inhaler 2 Puff(s) Inhalation every 6 hours  aspirin  chewable 81 milliGRAM(s) Oral daily  chlorhexidine 2% Cloths 1 Application(s) Topical daily  dextrose 40% Gel 15 Gram(s) Oral once  dextrose 5%. 1000 milliLiter(s) (50 mL/Hr) IV Continuous <Continuous>  dextrose 5%. 1000 milliLiter(s) (100 mL/Hr) IV Continuous <Continuous>  dextrose 50% Injectable 25 Gram(s) IV Push once  dextrose 50% Injectable 12.5 Gram(s) IV Push once  dextrose 50% Injectable 25 Gram(s) IV Push once  glucagon  Injectable 1 milliGRAM(s) IntraMuscular once  heparin   Injectable 5000 Unit(s) SubCutaneous every 8 hours  insulin lispro (ADMELOG) corrective regimen sliding scale   SubCutaneous every 6 hours  lactated ringers. 500 milliLiter(s) (50 mL/Hr) IV Continuous <Continuous>  lactobacillus acidophilus 1 Tablet(s) Oral daily  LORazepam     Tablet 1 milliGRAM(s) Oral three times a day  LORazepam   Injectable 2 milliGRAM(s) IV Push once  methocarbamol 250 milliGRAM(s) Oral two times a day  pantoprazole   Suspension 40 milliGRAM(s) Oral daily  polyethylene glycol 3350 17 Gram(s) Oral every 12 hours  senna 2 Tablet(s) Oral at bedtime  simethicone 80 milliGRAM(s) Chew every 6 hours  sucralfate suspension 1 Gram(s) Oral four times a day    MEDICATIONS  (PRN):  acetaminophen     Tablet .. 650 milliGRAM(s) Oral every 6 hours PRN Mild Pain (1 - 3)  acetaminophen    Suspension .. 650 milliGRAM(s) Oral every 6 hours PRN Temp greater or equal to 38C (100.4F)  LORazepam   Injectable 2 milliGRAM(s) IV Push every 2 hours PRN Aggitation / Vent Compliance / Tachypnea > 30      Diet, NPO with Tube Feed:   Tube Feeding Modality: Gastrostomy  Glucerna 1.5 Horacio  Total Volume for 24 Hours (mL): 1200  Continuous  Until Goal Tube Feed Rate (mL per Hour): 50  Tube Feed Duration (in Hours): 24  Tube Feed Start Time: 08:00  Free Water Flush  Bolus   Total Volume per Flush (mL): 300   Frequency: Every 8 Hours  No Carb Prosource TF     Qty per Day:  2 (11-10-21 @ 13:10) [Active]          Vital Signs Last 24 Hrs  T(C): 36.7 (11 Nov 2021 08:23), Max: 37.1 (10 Nov 2021 12:46)  T(F): 98.1 (11 Nov 2021 08:23), Max: 98.8 (10 Nov 2021 23:59)  HR: 74 (11 Nov 2021 08:00) (60 - 83)  BP: 124/60 (11 Nov 2021 08:00) (93/50 - 151/108)  BP(mean): 79 (11 Nov 2021 08:00) (64 - 121)  RR: 16 (11 Nov 2021 08:00) (15 - 46)  SpO2: 99% (11 Nov 2021 08:00) (97% - 100%)      11-10-21 @ 07:01  -  11-11-21 @ 07:00  --------------------------------------------------------  IN: 2260 mL / OUT: 1000 mL / NET: 1260 mL    11-11-21 @ 07:01  -  11-11-21 @ 09:25  --------------------------------------------------------  IN: 50 mL / OUT: 0 mL / NET: 50 mL        Mode: AC/ CMV (Assist Control/ Continuous Mandatory Ventilation), RR (machine): 18, TV (machine): 400, FiO2: 30, PEEP: 5, ITime: 1, MAP: 16, PIP: 37      LABS:                        8.3    5.40  )-----------( 228      ( 11 Nov 2021 08:37 )             27.2     11-11    139  |  106  |  29<H>  ----------------------------<  171<H>  4.4   |  25  |  0.91    Ca    8.8      11 Nov 2021 08:37  Phos  2.2     11-11  Mg     2.0     11-11    TPro  6.6  /  Alb  2.0<L>  /  TBili  0.3  /  DBili  x   /  AST  21  /  ALT  28  /  AlkPhos  97  11-11              WBC:  WBC Count: 5.40 K/uL (11-11 @ 08:37)  WBC Count: 5.50 K/uL (11-10 @ 06:32)  WBC Count: 13.41 K/uL (11-09 @ 05:58)  WBC Count: 7.80 K/uL (11-08 @ 06:26)      MICROBIOLOGY:  RECENT CULTURES:  11-08 .Sputum Sputum Pseudomonas aeruginosa (Carbapenem Resistant)   Few polymorphonuclear leukocytes per low power field  No Squamous epithelial cells per low power field  Rare Gram Variable Rods per oil power field   Few Pseudomonas aeruginosa (Carbapenem Resistant)  Normal Respiratory Alejandro present    11-07 .Blood Blood-Peripheral XXXX XXXX   No growth to date.    11-07 Clean Catch Clean Catch (Midstream) XXXX XXXX   >100,000 CFU/ml Candida albicans "Susceptibilities not performed"  <10,000 CFU/ml Normal Urogenital alejandro present                    Sodium:  Sodium, Serum: 139 mmol/L (11-11 @ 08:37)  Sodium, Serum: 141 mmol/L (11-10 @ 06:32)  Sodium, Serum: 140 mmol/L (11-09 @ 05:58)  Sodium, Serum: 146 mmol/L (11-08 @ 16:38)  Sodium, Serum: 146 mmol/L (11-08 @ 06:26)  Sodium, Serum: 140 mmol/L (11-07 @ 11:37)      0.91 mg/dL 11-11 @ 08:37  0.99 mg/dL 11-10 @ 06:32  1.36 mg/dL 11-09 @ 05:58  1.35 mg/dL 11-08 @ 16:38  1.54 mg/dL 11-08 @ 06:26  1.68 mg/dL 11-07 @ 11:37      Hemoglobin:  Hemoglobin: 8.3 g/dL (11-11 @ 08:37)  Hemoglobin: 7.8 g/dL (11-10 @ 08:07)  Hemoglobin: 6.9 g/dL (11-10 @ 06:32)  Hemoglobin: 7.8 g/dL (11-09 @ 05:58)  Hemoglobin: 7.6 g/dL (11-08 @ 06:26)      Platelets: Platelet Count - Automated: 228 K/uL (11-11 @ 08:37)  Platelet Count - Automated: 165 K/uL (11-10 @ 06:32)  Platelet Count - Automated: 228 K/uL (11-09 @ 05:58)  Platelet Count - Automated: 180 K/uL (11-08 @ 06:26)      LIVER FUNCTIONS - ( 11 Nov 2021 08:37 )  Alb: 2.0 g/dL / Pro: 6.6 g/dL / ALK PHOS: 97 U/L / ALT: 28 U/L DA / AST: 21 U/L / GGT: x                 RADIOLOGY & ADDITIONAL STUDIES:      MICROBIOLOGY:  RECENT CULTURES:  11-08 .Sputum Sputum Pseudomonas aeruginosa (Carbapenem Resistant)   Few polymorphonuclear leukocytes per low power field  No Squamous epithelial cells per low power field  Rare Gram Variable Rods per oil power field   Few Pseudomonas aeruginosa (Carbapenem Resistant)  Normal Respiratory Alejandro present    11-07 .Blood Blood-Peripheral XXXX XXXX   No growth to date.    11-07 Clean Catch Clean Catch (Midstream) XXXX XXXX   >100,000 CFU/ml Candida albicans "Susceptibilities not performed"  <10,000 CFU/ml Normal Urogenital alejandro present

## 2021-11-11 NOTE — PROGRESS NOTE ADULT - SUBJECTIVE AND OBJECTIVE BOX
Patient is a 75y Female whom presented to the hospital with manasa     PAST MEDICAL & SURGICAL HISTORY:  Dementia of frontal lobe type    Aphasic stroke    Diabetes mellitus    Respiratory failure    Hypertension    GERD (gastroesophageal reflux disease)    Constipation    Respiratory failure    CVA (cerebral vascular accident)    HTN (hypertension)    DM (diabetes mellitus)    Advanced dementia    COVID-19 virus detected    Quadriplegia    Pneumonia    Type II diabetes mellitus    Hx of appendectomy    Gastrostomy in place    Tracheostomy in place    Tracheostomy tube present    Feeding by G-tube        MEDICATIONS  (STANDING):  ALBUTerol    90 MICROgram(s) HFA Inhaler 2 Puff(s) Inhalation every 6 hours  albuterol/ipratropium for Nebulization 3 milliLiter(s) Nebulizer every 6 hours  aspirin  chewable 81 milliGRAM(s) Oral daily  chlorhexidine 0.12% Liquid 15 milliLiter(s) Oral Mucosa every 12 hours  dextrose 40% Gel 15 Gram(s) Oral once  dextrose 5% + sodium chloride 0.9%. 1000 milliLiter(s) (80 mL/Hr) IV Continuous <Continuous>  dextrose 5%. 1000 milliLiter(s) (50 mL/Hr) IV Continuous <Continuous>  dextrose 5%. 1000 milliLiter(s) (100 mL/Hr) IV Continuous <Continuous>  dextrose 50% Injectable 25 Gram(s) IV Push once  dextrose 50% Injectable 12.5 Gram(s) IV Push once  dextrose 50% Injectable 25 Gram(s) IV Push once  glucagon  Injectable 1 milliGRAM(s) IntraMuscular once  heparin   Injectable 5000 Unit(s) SubCutaneous every 8 hours  lactobacillus acidophilus 1 Tablet(s) Oral daily  methocarbamol 250 milliGRAM(s) Oral two times a day  pantoprazole   Suspension 40 milliGRAM(s) Oral daily  polyethylene glycol 3350 17 Gram(s) Oral every 12 hours  senna 2 Tablet(s) Oral at bedtime  simethicone 80 milliGRAM(s) Chew every 6 hours  sucralfate suspension 1 Gram(s) Oral four times a day      Allergies    codeine (Hives)    Intolerances        SOCIAL HISTORY:  Denies ETOh,Smoking,     FAMILY HISTORY:      REVIEW OF SYSTEMS:    CONSTITUTIONAL: No weakness, fevers or chills  RESPIRATORY: No cough, wheezing, hemoptysis; No shortness of breath  CARDIOVASCULAR: No chest pain or palpitations  GASTROINTESTINAL: No abdominal or epigastric pain. No nausea, vomiting,                                        PHYSICAL EXAM:                        8.3    5.40  )-----------( 228      ( 11 Nov 2021 08:37 )             27.2       CBC Full  -  ( 11 Nov 2021 08:37 )  WBC Count : 5.40 K/uL  RBC Count : 3.15 M/uL  Hemoglobin : 8.3 g/dL  Hematocrit : 27.2 %  Platelet Count - Automated : 228 K/uL  Mean Cell Volume : 86.3 fl  Mean Cell Hemoglobin : 26.3 pg  Mean Cell Hemoglobin Concentration : 30.5 gm/dL  Auto Neutrophil # : 3.40 K/uL  Auto Lymphocyte # : 1.26 K/uL  Auto Monocyte # : 0.51 K/uL  Auto Eosinophil # : 0.12 K/uL  Auto Basophil # : 0.02 K/uL  Auto Neutrophil % : 63.0 %  Auto Lymphocyte % : 23.3 %  Auto Monocyte % : 9.4 %  Auto Eosinophil % : 2.2 %  Auto Basophil % : 0.4 %      11-11    139  |  106  |  29<H>  ----------------------------<  171<H>  4.4   |  25  |  0.91    Ca    8.8      11 Nov 2021 08:37  Phos  2.2     11-11  Mg     2.0     11-11    TPro  6.6  /  Alb  2.0<L>  /  TBili  0.3  /  DBili  x   /  AST  21  /  ALT  28  /  AlkPhos  97  11-11      CAPILLARY BLOOD GLUCOSE      POCT Blood Glucose.: 172 mg/dL (11 Nov 2021 11:40)  POCT Blood Glucose.: 146 mg/dL (11 Nov 2021 05:53)  POCT Blood Glucose.: 176 mg/dL (10 Nov 2021 21:57)  POCT Blood Glucose.: 165 mg/dL (10 Nov 2021 17:04)      Vital Signs Last 24 Hrs  T(C): 36.7 (11 Nov 2021 12:58), Max: 37.1 (10 Nov 2021 23:59)  T(F): 98 (11 Nov 2021 12:58), Max: 98.8 (10 Nov 2021 23:59)  HR: 97 (11 Nov 2021 13:46) (66 - 100)  BP: 124/60 (11 Nov 2021 08:00) (105/72 - 151/108)  BP(mean): 79 (11 Nov 2021 08:00) (70 - 121)  RR: 16 (11 Nov 2021 08:00) (16 - 46)  SpO2: 99% (11 Nov 2021 13:46) (97% - 100%)          Constitutional: NAD  HEENT: conjunctive   clear   Neck:  No JVD  Respiratory: pos for trach    Cardiovascular: S1 and S2  Gastrointestinal: BS+, soft, pos for peg   Extremities: No peripheral edema

## 2021-11-15 LAB
CORTICOSTEROID BINDING GLOBULIN RESULT: 1.5 MG/DL — LOW
CORTIS F/TOTAL MFR SERPL: 32 % — SIGNIFICANT CHANGE UP
CORTIS SERPL-MCNC: 14 UG/DL — SIGNIFICANT CHANGE UP
CORTISOL, FREE RESULT: 4.5 UG/DL — HIGH

## 2021-12-08 ENCOUNTER — LABORATORY RESULT (OUTPATIENT)
Age: 76
End: 2021-12-08

## 2021-12-08 ENCOUNTER — INPATIENT (INPATIENT)
Facility: HOSPITAL | Age: 76
LOS: 11 days | Discharge: NURSING, REHAB OR LONG-TERM CARE | DRG: 870 | End: 2021-12-20
Attending: STUDENT IN AN ORGANIZED HEALTH CARE EDUCATION/TRAINING PROGRAM | Admitting: STUDENT IN AN ORGANIZED HEALTH CARE EDUCATION/TRAINING PROGRAM
Payer: MEDICARE

## 2021-12-08 VITALS
HEIGHT: 65 IN | SYSTOLIC BLOOD PRESSURE: 90 MMHG | WEIGHT: 134.92 LBS | HEART RATE: 84 BPM | RESPIRATION RATE: 18 BRPM | OXYGEN SATURATION: 100 % | TEMPERATURE: 100 F | DIASTOLIC BLOOD PRESSURE: 40 MMHG

## 2021-12-08 DIAGNOSIS — Z93.0 TRACHEOSTOMY STATUS: Chronic | ICD-10-CM

## 2021-12-08 DIAGNOSIS — Z93.1 GASTROSTOMY STATUS: Chronic | ICD-10-CM

## 2021-12-08 DIAGNOSIS — D64.9 ANEMIA, UNSPECIFIED: ICD-10-CM

## 2021-12-08 LAB
ALBUMIN SERPL ELPH-MCNC: 2.1 G/DL — LOW (ref 3.3–5)
ALP SERPL-CCNC: 103 U/L — SIGNIFICANT CHANGE UP (ref 30–120)
ALT FLD-CCNC: 27 U/L DA — SIGNIFICANT CHANGE UP (ref 10–60)
ANION GAP SERPL CALC-SCNC: 10 MMOL/L — SIGNIFICANT CHANGE UP (ref 5–17)
ANION GAP SERPL CALC-SCNC: 9 MMOL/L — SIGNIFICANT CHANGE UP (ref 5–17)
APPEARANCE UR: CLEAR — SIGNIFICANT CHANGE UP
APTT BLD: 33.8 SEC — SIGNIFICANT CHANGE UP (ref 27.5–35.5)
AST SERPL-CCNC: 39 U/L — SIGNIFICANT CHANGE UP (ref 10–40)
BACTERIA # UR AUTO: ABNORMAL
BASOPHILS # BLD AUTO: 0.02 K/UL — SIGNIFICANT CHANGE UP (ref 0–0.2)
BASOPHILS NFR BLD AUTO: 0.2 % — SIGNIFICANT CHANGE UP (ref 0–2)
BILIRUB SERPL-MCNC: 0.5 MG/DL — SIGNIFICANT CHANGE UP (ref 0.2–1.2)
BILIRUB UR-MCNC: NEGATIVE — SIGNIFICANT CHANGE UP
BLD GP AB SCN SERPL QL: SIGNIFICANT CHANGE UP
BUN SERPL-MCNC: 80 MG/DL — HIGH (ref 7–23)
BUN SERPL-MCNC: 89 MG/DL — HIGH (ref 7–23)
CALCIUM SERPL-MCNC: 8.3 MG/DL — LOW (ref 8.4–10.5)
CALCIUM SERPL-MCNC: 8.5 MG/DL — SIGNIFICANT CHANGE UP (ref 8.4–10.5)
CHLORIDE SERPL-SCNC: 88 MMOL/L — LOW (ref 96–108)
CHLORIDE SERPL-SCNC: 92 MMOL/L — LOW (ref 96–108)
CO2 SERPL-SCNC: 27 MMOL/L — SIGNIFICANT CHANGE UP (ref 22–31)
CO2 SERPL-SCNC: 30 MMOL/L — SIGNIFICANT CHANGE UP (ref 22–31)
COLOR SPEC: YELLOW — SIGNIFICANT CHANGE UP
CREAT SERPL-MCNC: 1.65 MG/DL — HIGH (ref 0.5–1.3)
CREAT SERPL-MCNC: 2 MG/DL — HIGH (ref 0.5–1.3)
DIFF PNL FLD: NEGATIVE — SIGNIFICANT CHANGE UP
EOSINOPHIL # BLD AUTO: 0.22 K/UL — SIGNIFICANT CHANGE UP (ref 0–0.5)
EOSINOPHIL NFR BLD AUTO: 1.8 % — SIGNIFICANT CHANGE UP (ref 0–6)
GLUCOSE BLDC GLUCOMTR-MCNC: 115 MG/DL — HIGH (ref 70–99)
GLUCOSE SERPL-MCNC: 148 MG/DL — HIGH (ref 70–99)
GLUCOSE SERPL-MCNC: 99 MG/DL — SIGNIFICANT CHANGE UP (ref 70–99)
GLUCOSE UR QL: NEGATIVE MG/DL — SIGNIFICANT CHANGE UP
HCT VFR BLD CALC: 16.9 % — CRITICAL LOW (ref 34.5–45)
HCT VFR BLD CALC: 26.1 % — LOW (ref 34.5–45)
HCT VFR BLD CALC: 30.9 % — LOW (ref 34.5–45)
HGB BLD-MCNC: 5.2 G/DL — CRITICAL LOW (ref 11.5–15.5)
HGB BLD-MCNC: 8.2 G/DL — LOW (ref 11.5–15.5)
HGB BLD-MCNC: 9.7 G/DL — LOW (ref 11.5–15.5)
IMM GRANULOCYTES NFR BLD AUTO: 0.5 % — SIGNIFICANT CHANGE UP (ref 0–1.5)
INR BLD: 1.16 RATIO — SIGNIFICANT CHANGE UP (ref 0.88–1.16)
KETONES UR-MCNC: NEGATIVE — SIGNIFICANT CHANGE UP
LACTATE SERPL-SCNC: 1.9 MMOL/L — SIGNIFICANT CHANGE UP (ref 0.7–2)
LEUKOCYTE ESTERASE UR-ACNC: ABNORMAL
LYMPHOCYTES # BLD AUTO: 0.82 K/UL — LOW (ref 1–3.3)
LYMPHOCYTES # BLD AUTO: 6.7 % — LOW (ref 13–44)
MCHC RBC-ENTMCNC: 25.9 PG — LOW (ref 27–34)
MCHC RBC-ENTMCNC: 26.1 PG — LOW (ref 27–34)
MCHC RBC-ENTMCNC: 26.4 PG — LOW (ref 27–34)
MCHC RBC-ENTMCNC: 30.8 GM/DL — LOW (ref 32–36)
MCHC RBC-ENTMCNC: 31.4 GM/DL — LOW (ref 32–36)
MCHC RBC-ENTMCNC: 31.4 GM/DL — LOW (ref 32–36)
MCV RBC AUTO: 82.6 FL — SIGNIFICANT CHANGE UP (ref 80–100)
MCV RBC AUTO: 83.1 FL — SIGNIFICANT CHANGE UP (ref 80–100)
MCV RBC AUTO: 85.8 FL — SIGNIFICANT CHANGE UP (ref 80–100)
MONOCYTES # BLD AUTO: 0.9 K/UL — SIGNIFICANT CHANGE UP (ref 0–0.9)
MONOCYTES NFR BLD AUTO: 7.4 % — SIGNIFICANT CHANGE UP (ref 2–14)
NEUTROPHILS # BLD AUTO: 10.16 K/UL — HIGH (ref 1.8–7.4)
NEUTROPHILS NFR BLD AUTO: 83.4 % — HIGH (ref 43–77)
NITRITE UR-MCNC: NEGATIVE — SIGNIFICANT CHANGE UP
NRBC # BLD: 0 /100 WBCS — SIGNIFICANT CHANGE UP (ref 0–0)
PH UR: 5 — SIGNIFICANT CHANGE UP (ref 5–8)
PLATELET # BLD AUTO: 132 K/UL — LOW (ref 150–400)
PLATELET # BLD AUTO: 146 K/UL — LOW (ref 150–400)
PLATELET # BLD AUTO: 160 K/UL — SIGNIFICANT CHANGE UP (ref 150–400)
POTASSIUM SERPL-MCNC: 3.7 MMOL/L — SIGNIFICANT CHANGE UP (ref 3.5–5.3)
POTASSIUM SERPL-MCNC: 3.9 MMOL/L — SIGNIFICANT CHANGE UP (ref 3.5–5.3)
POTASSIUM SERPL-SCNC: 3.7 MMOL/L — SIGNIFICANT CHANGE UP (ref 3.5–5.3)
POTASSIUM SERPL-SCNC: 3.9 MMOL/L — SIGNIFICANT CHANGE UP (ref 3.5–5.3)
PROT SERPL-MCNC: 6.8 G/DL — SIGNIFICANT CHANGE UP (ref 6–8.3)
PROT UR-MCNC: 30 MG/DL
PROTHROM AB SERPL-ACNC: 13.9 SEC — HIGH (ref 10.6–13.6)
RAPID RVP RESULT: SIGNIFICANT CHANGE UP
RBC # BLD: 1.97 M/UL — LOW (ref 3.8–5.2)
RBC # BLD: 3.14 M/UL — LOW (ref 3.8–5.2)
RBC # BLD: 3.74 M/UL — LOW (ref 3.8–5.2)
RBC # FLD: 17.1 % — HIGH (ref 10.3–14.5)
RBC # FLD: 17.2 % — HIGH (ref 10.3–14.5)
RBC # FLD: 17.5 % — HIGH (ref 10.3–14.5)
RBC CASTS # UR COMP ASSIST: SIGNIFICANT CHANGE UP /HPF (ref 0–4)
SARS-COV-2 RNA SPEC QL NAA+PROBE: SIGNIFICANT CHANGE UP
SODIUM SERPL-SCNC: 127 MMOL/L — LOW (ref 135–145)
SODIUM SERPL-SCNC: 129 MMOL/L — LOW (ref 135–145)
SP GR SPEC: 1.01 — SIGNIFICANT CHANGE UP (ref 1.01–1.02)
UROBILINOGEN FLD QL: NEGATIVE MG/DL — SIGNIFICANT CHANGE UP
WBC # BLD: 12.18 K/UL — HIGH (ref 3.8–10.5)
WBC # BLD: 6.92 K/UL — SIGNIFICANT CHANGE UP (ref 3.8–10.5)
WBC # BLD: 8.3 K/UL — SIGNIFICANT CHANGE UP (ref 3.8–10.5)
WBC # FLD AUTO: 12.18 K/UL — HIGH (ref 3.8–10.5)
WBC # FLD AUTO: 6.92 K/UL — SIGNIFICANT CHANGE UP (ref 3.8–10.5)
WBC # FLD AUTO: 8.3 K/UL — SIGNIFICANT CHANGE UP (ref 3.8–10.5)
WBC UR QL: SIGNIFICANT CHANGE UP

## 2021-12-08 PROCEDURE — 99291 CRITICAL CARE FIRST HOUR: CPT

## 2021-12-08 PROCEDURE — 74176 CT ABD & PELVIS W/O CONTRAST: CPT | Mod: 26

## 2021-12-08 PROCEDURE — 99285 EMERGENCY DEPT VISIT HI MDM: CPT | Mod: CS

## 2021-12-08 PROCEDURE — 71045 X-RAY EXAM CHEST 1 VIEW: CPT | Mod: 26

## 2021-12-08 PROCEDURE — 71250 CT THORAX DX C-: CPT | Mod: 26

## 2021-12-08 RX ORDER — LACTOBACILLUS ACIDOPHILUS 100MM CELL
1 CAPSULE ORAL DAILY
Refills: 0 | Status: DISCONTINUED | OUTPATIENT
Start: 2021-12-08 | End: 2021-12-08

## 2021-12-08 RX ORDER — VANCOMYCIN HCL 1 G
1000 VIAL (EA) INTRAVENOUS ONCE
Refills: 0 | Status: COMPLETED | OUTPATIENT
Start: 2021-12-08 | End: 2021-12-08

## 2021-12-08 RX ORDER — VANCOMYCIN HCL 1 G
750 VIAL (EA) INTRAVENOUS EVERY 12 HOURS
Refills: 0 | Status: DISCONTINUED | OUTPATIENT
Start: 2021-12-08 | End: 2021-12-09

## 2021-12-08 RX ORDER — SUCRALFATE 1 G
1 TABLET ORAL EVERY 6 HOURS
Refills: 0 | Status: DISCONTINUED | OUTPATIENT
Start: 2021-12-08 | End: 2021-12-08

## 2021-12-08 RX ORDER — ACETAMINOPHEN 500 MG
650 TABLET ORAL ONCE
Refills: 0 | Status: COMPLETED | OUTPATIENT
Start: 2021-12-08 | End: 2021-12-08

## 2021-12-08 RX ORDER — POLYETHYLENE GLYCOL 3350 17 G/17G
17 POWDER, FOR SOLUTION ORAL EVERY 12 HOURS
Refills: 0 | Status: DISCONTINUED | OUTPATIENT
Start: 2021-12-08 | End: 2021-12-20

## 2021-12-08 RX ORDER — TIOTROPIUM BROMIDE 18 UG/1
1 CAPSULE ORAL; RESPIRATORY (INHALATION) DAILY
Refills: 0 | Status: DISCONTINUED | OUTPATIENT
Start: 2021-12-08 | End: 2021-12-10

## 2021-12-08 RX ORDER — SIMETHICONE 80 MG/1
80 TABLET, CHEWABLE ORAL EVERY 6 HOURS
Refills: 0 | Status: DISCONTINUED | OUTPATIENT
Start: 2021-12-08 | End: 2021-12-20

## 2021-12-08 RX ORDER — SENNA PLUS 8.6 MG/1
2 TABLET ORAL AT BEDTIME
Refills: 0 | Status: DISCONTINUED | OUTPATIENT
Start: 2021-12-08 | End: 2021-12-20

## 2021-12-08 RX ORDER — SODIUM CHLORIDE 9 MG/ML
1000 INJECTION, SOLUTION INTRAVENOUS
Refills: 0 | Status: DISCONTINUED | OUTPATIENT
Start: 2021-12-08 | End: 2021-12-20

## 2021-12-08 RX ORDER — INSULIN LISPRO 100/ML
VIAL (ML) SUBCUTANEOUS EVERY 6 HOURS
Refills: 0 | Status: DISCONTINUED | OUTPATIENT
Start: 2021-12-08 | End: 2021-12-20

## 2021-12-08 RX ORDER — ACETAMINOPHEN 500 MG
650 TABLET ORAL EVERY 6 HOURS
Refills: 0 | Status: DISCONTINUED | OUTPATIENT
Start: 2021-12-08 | End: 2021-12-20

## 2021-12-08 RX ORDER — PIPERACILLIN AND TAZOBACTAM 4; .5 G/20ML; G/20ML
3.38 INJECTION, POWDER, LYOPHILIZED, FOR SOLUTION INTRAVENOUS ONCE
Refills: 0 | Status: COMPLETED | OUTPATIENT
Start: 2021-12-08 | End: 2021-12-08

## 2021-12-08 RX ORDER — PIPERACILLIN AND TAZOBACTAM 4; .5 G/20ML; G/20ML
0 INJECTION, POWDER, LYOPHILIZED, FOR SOLUTION INTRAVENOUS
Qty: 0 | Refills: 0 | DISCHARGE

## 2021-12-08 RX ORDER — CHLORHEXIDINE GLUCONATE 213 G/1000ML
15 SOLUTION TOPICAL EVERY 12 HOURS
Refills: 0 | Status: DISCONTINUED | OUTPATIENT
Start: 2021-12-08 | End: 2021-12-20

## 2021-12-08 RX ORDER — ALBUTEROL 90 UG/1
2 AEROSOL, METERED ORAL EVERY 6 HOURS
Refills: 0 | Status: DISCONTINUED | OUTPATIENT
Start: 2021-12-08 | End: 2021-12-20

## 2021-12-08 RX ORDER — DEXTROSE 50 % IN WATER 50 %
15 SYRINGE (ML) INTRAVENOUS ONCE
Refills: 0 | Status: DISCONTINUED | OUTPATIENT
Start: 2021-12-08 | End: 2021-12-20

## 2021-12-08 RX ORDER — PANTOPRAZOLE SODIUM 20 MG/1
40 TABLET, DELAYED RELEASE ORAL EVERY 12 HOURS
Refills: 0 | Status: DISCONTINUED | OUTPATIENT
Start: 2021-12-08 | End: 2021-12-19

## 2021-12-08 RX ORDER — SODIUM CHLORIDE 9 MG/ML
1000 INJECTION, SOLUTION INTRAVENOUS
Refills: 0 | Status: DISCONTINUED | OUTPATIENT
Start: 2021-12-08 | End: 2021-12-09

## 2021-12-08 RX ORDER — DEXTROSE 50 % IN WATER 50 %
12.5 SYRINGE (ML) INTRAVENOUS ONCE
Refills: 0 | Status: DISCONTINUED | OUTPATIENT
Start: 2021-12-08 | End: 2021-12-20

## 2021-12-08 RX ORDER — DEXTROSE 50 % IN WATER 50 %
25 SYRINGE (ML) INTRAVENOUS ONCE
Refills: 0 | Status: DISCONTINUED | OUTPATIENT
Start: 2021-12-08 | End: 2021-12-20

## 2021-12-08 RX ORDER — GLUCAGON INJECTION, SOLUTION 0.5 MG/.1ML
1 INJECTION, SOLUTION SUBCUTANEOUS ONCE
Refills: 0 | Status: DISCONTINUED | OUTPATIENT
Start: 2021-12-08 | End: 2021-12-20

## 2021-12-08 RX ORDER — PIPERACILLIN AND TAZOBACTAM 4; .5 G/20ML; G/20ML
3.38 INJECTION, POWDER, LYOPHILIZED, FOR SOLUTION INTRAVENOUS EVERY 8 HOURS
Refills: 0 | Status: DISCONTINUED | OUTPATIENT
Start: 2021-12-08 | End: 2021-12-13

## 2021-12-08 RX ORDER — SODIUM CHLORIDE 9 MG/ML
2000 INJECTION INTRAMUSCULAR; INTRAVENOUS; SUBCUTANEOUS ONCE
Refills: 0 | Status: COMPLETED | OUTPATIENT
Start: 2021-12-08 | End: 2021-12-08

## 2021-12-08 RX ORDER — SUCRALFATE 1 G
1 TABLET ORAL EVERY 6 HOURS
Refills: 0 | Status: DISCONTINUED | OUTPATIENT
Start: 2021-12-08 | End: 2021-12-20

## 2021-12-08 RX ORDER — METHOCARBAMOL 500 MG/1
250 TABLET, FILM COATED ORAL
Refills: 0 | Status: DISCONTINUED | OUTPATIENT
Start: 2021-12-08 | End: 2021-12-14

## 2021-12-08 RX ADMIN — SODIUM CHLORIDE 100 MILLILITER(S): 9 INJECTION, SOLUTION INTRAVENOUS at 23:21

## 2021-12-08 RX ADMIN — Medication 650 MILLIGRAM(S): at 18:29

## 2021-12-08 RX ADMIN — Medication 650 MILLIGRAM(S): at 13:00

## 2021-12-08 RX ADMIN — SIMETHICONE 80 MILLIGRAM(S): 80 TABLET, CHEWABLE ORAL at 18:48

## 2021-12-08 RX ADMIN — Medication 1000 MILLIGRAM(S): at 13:30

## 2021-12-08 RX ADMIN — PIPERACILLIN AND TAZOBACTAM 3.38 GRAM(S): 4; .5 INJECTION, POWDER, LYOPHILIZED, FOR SOLUTION INTRAVENOUS at 13:15

## 2021-12-08 RX ADMIN — PANTOPRAZOLE SODIUM 40 MILLIGRAM(S): 20 TABLET, DELAYED RELEASE ORAL at 14:24

## 2021-12-08 RX ADMIN — Medication 250 MILLIGRAM(S): at 12:36

## 2021-12-08 RX ADMIN — ALBUTEROL 2 PUFF(S): 90 AEROSOL, METERED ORAL at 20:19

## 2021-12-08 RX ADMIN — SENNA PLUS 2 TABLET(S): 8.6 TABLET ORAL at 23:27

## 2021-12-08 RX ADMIN — Medication 650 MILLIGRAM(S): at 11:52

## 2021-12-08 RX ADMIN — SODIUM CHLORIDE 2000 MILLILITER(S): 9 INJECTION INTRAMUSCULAR; INTRAVENOUS; SUBCUTANEOUS at 13:00

## 2021-12-08 RX ADMIN — PIPERACILLIN AND TAZOBACTAM 25 GRAM(S): 4; .5 INJECTION, POWDER, LYOPHILIZED, FOR SOLUTION INTRAVENOUS at 23:20

## 2021-12-08 RX ADMIN — SIMETHICONE 80 MILLIGRAM(S): 80 TABLET, CHEWABLE ORAL at 23:27

## 2021-12-08 RX ADMIN — POLYETHYLENE GLYCOL 3350 17 GRAM(S): 17 POWDER, FOR SOLUTION ORAL at 18:49

## 2021-12-08 RX ADMIN — METHOCARBAMOL 250 MILLIGRAM(S): 500 TABLET, FILM COATED ORAL at 18:30

## 2021-12-08 RX ADMIN — PIPERACILLIN AND TAZOBACTAM 200 GRAM(S): 4; .5 INJECTION, POWDER, LYOPHILIZED, FOR SOLUTION INTRAVENOUS at 11:55

## 2021-12-08 RX ADMIN — Medication 1 GRAM(S): at 18:29

## 2021-12-08 RX ADMIN — SODIUM CHLORIDE 2000 MILLILITER(S): 9 INJECTION INTRAMUSCULAR; INTRAVENOUS; SUBCUTANEOUS at 11:56

## 2021-12-08 NOTE — CONSULT NOTE ADULT - SUBJECTIVE AND OBJECTIVE BOX
BREA BECKHAM    [facility]    Allergies    codeine (Hives)    Intolerances        PAST MEDICAL & SURGICAL HISTORY:  Dementia of frontal lobe type    Aphasic stroke    Diabetes mellitus    Respiratory failure    Hypertension    GERD (gastroesophageal reflux disease)    Constipation    Respiratory failure    CVA (cerebral vascular accident)    HTN (hypertension)    DM (diabetes mellitus)    Advanced dementia    COVID-19 virus detected    Quadriplegia    Pneumonia    Type II diabetes mellitus    Hx of appendectomy    Gastrostomy in place    Tracheostomy in place    Tracheostomy tube present    Feeding by G-tube        FAMILY HISTORY:      Home Medications:  albuterol 90 mcg/inh inhalation aerosol: 2 puff(s) inhaled every 6 hours (07 Nov 2021 00:03)  aspirin 81 mg oral tablet, chewable: 1 tab(s) by PEG tube once a day (07 Nov 2021 00:03)  Bacid (LAC) oral tablet: 2 tab(s) by gastrostomy tube once a day (07 Nov 2021 00:03)  Carafate 1 g/10 mL oral suspension: 10 milliliter(s) by gastrostomy tube 4 times a day (before meals and at bedtime) for 14 days (Started 6/4/21) (07 Nov 2021 00:03)  chlorhexidine 0.12% mucous membrane liquid: 15 milliliter(s) mucous membrane 2 times a day (07 Nov 2021 00:03)  heparin: DVT ppx (11 Nov 2021 10:04)  insulin lispro 100 units/mL injectable solution:  injectable; sliding scale coverage  (07 Nov 2021 00:03)  ipratropium-albuterol 0.5 mg-2.5 mg/3 mL inhalation solution: 3 milliliter(s) inhaled 4 times a day (07 Nov 2021 00:03)  LORazepam 1 mg oral tablet: 1 tab(s) by gastrostomy tube 3 times a day, As Needed (07 Nov 2021 01:48)  methocarbamol: 250 milligram(s) by gastrostomy tube 2 times a day (07 Nov 2021 00:03)  pantoprazole 40 mg oral granule, delayed release: 40 milligram(s) by gastrostomy tube 2 times a day (07 Nov 2021 00:03)  polyethylene glycol 3350 oral powder for reconstitution: 17 gram(s) orally every 12 hours (07 Nov 2021 00:03)  senna 8.6 mg oral tablet: 3 tab(s) by gastrostomy tube once a day (at bedtime) (07 Nov 2021 01:48)  simethicone 80 mg oral tablet, chewable: 1 tab(s) orally every 6 hours (07 Nov 2021 00:03)  Tylenol 325 mg oral tablet: 2 tab(s) by gastrostomy tube once a day; 60 minutes prior to dressing change  (07 Nov 2021 01:48)      MEDICATIONS  (STANDING):  chlorhexidine 0.12% Liquid 15 milliLiter(s) Oral Mucosa every 12 hours    MEDICATIONS  (PRN):              Vital Signs Last 24 Hrs  T(C): 37.2 (08 Dec 2021 12:37), Max: 37.9 (08 Dec 2021 10:56)  T(F): 99 (08 Dec 2021 12:37), Max: 100.2 (08 Dec 2021 10:56)  HR: 23 (08 Dec 2021 12:37) (23 - 84)  BP: 93/35 (08 Dec 2021 12:37) (90/40 - 93/35)  BP(mean): --  RR: 18 (08 Dec 2021 10:56) (18 - 18)  SpO2: 100% (08 Dec 2021 10:56) (100% - 100%)              LABS:                        5.2    12.18 )-----------( 160      ( 08 Dec 2021 11:58 )             16.9     12-08    127<L>  |  88<L>  |  89<H>  ----------------------------<  148<H>  3.9   |  30  |  2.00<H>    Ca    8.5      08 Dec 2021 11:58    TPro  6.8  /  Alb  2.1<L>  /  TBili  0.5  /  DBili  x   /  AST  39  /  ALT  27  /  AlkPhos  103  12-08    PT/INR - ( 08 Dec 2021 11:58 )   PT: 13.9 sec;   INR: 1.16 ratio         PTT - ( 08 Dec 2021 11:58 )  PTT:33.8 sec          WBC:  WBC Count: 12.18 K/uL (12-08 @ 11:58)      MICROBIOLOGY:  RECENT CULTURES:              PT/INR - ( 08 Dec 2021 11:58 )   PT: 13.9 sec;   INR: 1.16 ratio         PTT - ( 08 Dec 2021 11:58 )  PTT:33.8 sec    Sodium:  Sodium, Serum: 127 mmol/L (12-08 @ 11:58)      2.00 mg/dL 12-08 @ 11:58      Hemoglobin:  Hemoglobin: 5.2 g/dL (12-08 @ 11:58)      Platelets: Platelet Count - Automated: 160 K/uL (12-08 @ 11:58)      LIVER FUNCTIONS - ( 08 Dec 2021 11:58 )  Alb: 2.1 g/dL / Pro: 6.8 g/dL / ALK PHOS: 103 U/L / ALT: 27 U/L DA / AST: 39 U/L / GGT: x                 RADIOLOGY & ADDITIONAL STUDIES:      MICROBIOLOGY:  RECENT CULTURES:

## 2021-12-08 NOTE — CONSULT NOTE ADULT - SUBJECTIVE AND OBJECTIVE BOX
History of Present Illness: The patient is a 76 year old female with a history of HTN, DM, CVA, dementia, chronic respiratory failure s/p trach, PEG, cardiac arrest who presents from NH with abnormal labs. She was found to have a hemoglobin of 6. No further history could be obtained from patient.    Past Medical/Surgical History:  HTN, DM, CVA, dementia, chronic respiratory failure s/p trach, PEG, cardiac arrest    Medications:  Home Medications:  albuterol 90 mcg/inh inhalation aerosol: 2 puff(s) inhaled every 6 hours (07 Nov 2021 00:03)  aspirin 81 mg oral tablet, chewable: 1 tab(s) by PEG tube once a day (07 Nov 2021 00:03)  Bacid (LAC) oral tablet: 2 tab(s) by gastrostomy tube once a day (07 Nov 2021 00:03)  Carafate 1 g/10 mL oral suspension: 10 milliliter(s) by gastrostomy tube 4 times a day (before meals and at bedtime) for 14 days (Started 6/4/21) (07 Nov 2021 00:03)  chlorhexidine 0.12% mucous membrane liquid: 15 milliliter(s) mucous membrane 2 times a day (07 Nov 2021 00:03)  heparin: DVT ppx (11 Nov 2021 10:04)  insulin lispro 100 units/mL injectable solution:  injectable; sliding scale coverage  (07 Nov 2021 00:03)  ipratropium-albuterol 0.5 mg-2.5 mg/3 mL inhalation solution: 3 milliliter(s) inhaled 4 times a day (07 Nov 2021 00:03)  LORazepam 1 mg oral tablet: 1 tab(s) by gastrostomy tube 3 times a day, As Needed (07 Nov 2021 01:48)  methocarbamol: 250 milligram(s) by gastrostomy tube 2 times a day (07 Nov 2021 00:03)  pantoprazole 40 mg oral granule, delayed release: 40 milligram(s) by gastrostomy tube 2 times a day (07 Nov 2021 00:03)  polyethylene glycol 3350 oral powder for reconstitution: 17 gram(s) orally every 12 hours (07 Nov 2021 00:03)  senna 8.6 mg oral tablet: 3 tab(s) by gastrostomy tube once a day (at bedtime) (07 Nov 2021 01:48)  simethicone 80 mg oral tablet, chewable: 1 tab(s) orally every 6 hours (07 Nov 2021 00:03)  Tylenol 325 mg oral tablet: 2 tab(s) by gastrostomy tube once a day; 60 minutes prior to dressing change  (07 Nov 2021 01:48)      Family History: Non-contributory family history of premature cardiovascular atherosclerotic disease    Social History: No tobacco, alcohol or drug use    Review of Systems:  General: No fevers, chills, weight gain  Skin: No rashes, color changes  Cardiovascular: No chest pain, orthopnea  Respiratory: No shortness of breath, cough  Gastrointestinal: No nausea, abdominal pain  Genitourinary: No incontinence, pain with urination  Musculoskeletal: No pain, swelling, decreased range of motion  Neurological: No headache, weakness  Psychiatric: No depression, anxiety  Endocrine: No weight gain, increased thirst  All other systems are comprehensively negative.    Physical Exam:  Vitals:        Vital Signs Last 24 Hrs  T(C): 37.9 (08 Dec 2021 10:56), Max: 37.9 (08 Dec 2021 10:56)  T(F): 100.2 (08 Dec 2021 10:56), Max: 100.2 (08 Dec 2021 10:56)  HR: 84 (08 Dec 2021 10:56) (84 - 84)  BP: 90/40 (08 Dec 2021 10:56) (90/40 - 90/40)  BP(mean): --  RR: 18 (08 Dec 2021 10:56) (18 - 18)  SpO2: 100% (08 Dec 2021 10:56) (100% - 100%)  General: NAD  HEENT: MMM  Neck: No JVD, no carotid bruit  Lungs: CTAB  CV: RRR, nl S1/S2, no M/R/G  Abdomen: S/NT/ND, +BS  Extremities: No LE edema, no cyanosis  Neuro: AAOx3, non-focal  Skin: No rash    Labs:                  ECG: Pending

## 2021-12-08 NOTE — CONSULT NOTE ADULT - SUBJECTIVE AND OBJECTIVE BOX
Date/Time Patient Seen:  		  Referring MD:   Data Reviewed	       Patient is a 76y old  Female who presents with a chief complaint of     Subjective/HPI     PAST MEDICAL & SURGICAL HISTORY:  Dementia of frontal lobe type    Aphasic stroke    Diabetes mellitus    Respiratory failure    Hypertension    GERD (gastroesophageal reflux disease)    Constipation    Respiratory failure    CVA (cerebral vascular accident)    HTN (hypertension)    DM (diabetes mellitus)    Advanced dementia    COVID-19 virus detected    Quadriplegia    Pneumonia    Type II diabetes mellitus    Hx of appendectomy    Gastrostomy in place    Tracheostomy in place    Tracheostomy tube present    Feeding by G-tube          Medication list         MEDICATIONS  (STANDING):  acetaminophen  Suppository .. 650 milliGRAM(s) Rectal once  chlorhexidine 0.12% Liquid 15 milliLiter(s) Oral Mucosa every 12 hours  piperacillin/tazobactam IVPB. 3.375 Gram(s) IV Intermittent Once  sodium chloride 0.9% Bolus 2000 milliLiter(s) IV Bolus once  vancomycin  IVPB 1000 milliGRAM(s) IV Intermittent once    MEDICATIONS  (PRN):         Vitals log        ICU Vital Signs Last 24 Hrs  T(C): 37.9 (08 Dec 2021 10:56), Max: 37.9 (08 Dec 2021 10:56)  T(F): 100.2 (08 Dec 2021 10:56), Max: 100.2 (08 Dec 2021 10:56)  HR: 84 (08 Dec 2021 10:56) (84 - 84)  BP: 90/40 (08 Dec 2021 10:56) (90/40 - 90/40)  BP(mean): --  ABP: --  ABP(mean): --  RR: 18 (08 Dec 2021 10:56) (18 - 18)  SpO2: 100% (08 Dec 2021 10:56) (100% - 100%)           Input and Output:  I&O's Detail      Lab Data                  Review of Systems	      Objective     Physical Examination        Pertinent Lab findings & Imaging      Woody:  NO   Adequate UO     I&O's Detail           Discussed with:     Cultures:	        Radiology                             Date/Time Patient Seen:  		  Referring MD:   Data Reviewed	       Patient is a 76y old  Female who presents with a chief complaint of     Subjective/HPI  seen and examined  vs noted  labs reviewed  lives in SNF  known to me well  vent [facility]    er provider note reviewed    non smoker  non drinker  · Chief Complaint: The patient is a 76y Female complaining of abnormal lab result.  · Unable to Obtain: Dementia  · HPI Objective Statement: 75 yo F sent [place] [facility] [place] labs. Patient unable to contribute to history. Had labs drawn this afternoon and reportedly has a Hgb of 6. Patient does not have a vaughn catheter. No report of fever, vomiting, bleeding. Receiving IV Zosyn through PICC line rt arm  PCP Desirae    HIV:    HIV Test Questions:  · In accordance with [place] State law, we offer every patient who comes to our ED an HIV test. Would you like to be tested today?	Unable to answer due to medical condition/unresponsive/etc...       PAST MEDICAL & SURGICAL HISTORY:  Dementia of frontal lobe type    Aphasic stroke    Diabetes mellitus    Respiratory failure    Hypertension    GERD (gastroesophageal reflux disease)    Constipation    Respiratory failure    CVA (cerebral vascular accident)    HTN (hypertension)    DM (diabetes mellitus)    Advanced dementia    COVID-19 virus detected    Quadriplegia    Pneumonia    Type II diabetes mellitus    Hx of appendectomy    Gastrostomy in place    Tracheostomy in place    Tracheostomy tube present    Feeding by G-tube          Medication list         MEDICATIONS  (STANDING):  acetaminophen  Suppository .. 650 milliGRAM(s) Rectal once  chlorhexidine 0.12% Liquid 15 milliLiter(s) Oral Mucosa every 12 hours  piperacillin/tazobactam IVPB. 3.375 Gram(s) IV Intermittent Once  sodium chloride 0.9% Bolus 2000 milliLiter(s) IV Bolus once  vancomycin  IVPB 1000 milliGRAM(s) IV Intermittent once    MEDICATIONS  (PRN):         Vitals log        ICU Vital Signs Last 24 Hrs  T(C): 37.9 (08 Dec 2021 10:56), Max: 37.9 (08 Dec 2021 10:56)  T(F): 100.2 (08 Dec 2021 10:56), Max: 100.2 (08 Dec 2021 10:56)  HR: 84 (08 Dec 2021 10:56) (84 - 84)  BP: 90/40 (08 Dec 2021 10:56) (90/40 - 90/40)  BP(mean): --  ABP: --  ABP(mean): --  RR: 18 (08 Dec 2021 10:56) (18 - 18)  SpO2: 100% (08 Dec 2021 10:56) (100% - 100%)           Input and Output:  I&O's Detail      Lab Data                  Review of Systems	  weakness  vent dep    Objective     Physical Examination    trach  peg  head nc  heart s1s2  lung dec BS      Pertinent Lab findings & Imaging      Vaughn:  NO   Adequate UO     I&O's Detail           Discussed with:     Cultures:	        Radiology    Ventricular Rate 83 BPM    Atrial Rate 83 BPM    P-R Interval 122 ms    QRS Duration 112 ms    Q-T Interval 412 ms    QTC Calculation(Bazett) 484 ms    P Axis 49 degrees    R Axis 35 degrees    T Axis 15 degrees    Diagnosis Line Normal sinus rhythm  Normal ECG  When compared with ECG of 06-NOV-2021 22:08,  No significant change was found  Confirmed by Alem Sotelo MD ([id number]) on 12/8/2021 12:45:51 PM

## 2021-12-08 NOTE — CONSULT NOTE ADULT - SUBJECTIVE AND OBJECTIVE BOX
Chief Complaint:  Patient is a 76y old  Female who presents with a chief complaint of Anemia (08 Dec 2021 15:50)    Dementia of frontal lobe type    Aphasic stroke    Diabetes mellitus    Respiratory failure    Hypertension    GERD (gastroesophageal reflux disease)    Constipation    Respiratory failure    CVA (cerebral vascular accident)    HTN (hypertension)    DM (diabetes mellitus)    Advanced dementia    COVID-19 virus detected    Quadriplegia    Pneumonia    Type II diabetes mellitus    Hx of appendectomy    Gastrostomy in place    Tracheostomy in place    Tracheostomy tube present    Feeding by G-tube       HPI:  ***Patient with dementia and is not responsive.  Collateral information obtained from chart and notes.***    75F with chronic resp failure - vent dependent, hx of subarachnoid hemorrhage, hx of cardiac arrest, dementia s/p PEG, HTN, DM2 on insulin, hx of CVA, GERD, COPD, admission here from  to 10/4 and - for respiratory failure/sepsis possibly 2/2 aspiration vs vent associated PNA who presents from [facility] for abnormal labs.  Patient does not contribute to history.  admitting labs w/ anemia and acute renal failure   no signs of overt gi bleeding.         XR: Tracheostomy cannula reidentified in position. No change heart mediastinum. Widespread bilateral airspace disease slightly improved.  correlate for pulmonary edema and/or infection. No significant pleural effusion. No pneumothorax. Patient's hand projects over portion of the right base. CT chest and A/P pending  (08 Dec 2021 15:50)      codeine (Hives)      chlorhexidine 0.12% Liquid 15 milliLiter(s) Oral Mucosa every 12 hours  lactated ringers. 1000 milliLiter(s) IV Continuous <Continuous>  pantoprazole  Injectable 40 milliGRAM(s) IV Push every 12 hours  piperacillin/tazobactam IVPB.. 3.375 Gram(s) IV Intermittent every 8 hours  vancomycin  IVPB 750 milliGRAM(s) IV Intermittent every 12 hours        FAMILY HISTORY:  No pertinent family history in first degree relatives    Relevant Social History:       Physical Exam:    Vital Signs:  Vital Signs Last 24 Hrs  T(C): 37.2 (08 Dec 2021 15:10), Max: 37.9 (08 Dec 2021 10:56)  T(F): 99 (08 Dec 2021 15:10), Max: 100.3 (08 Dec 2021 13:45)  HR: 81 (08 Dec 2021 15:10) (80 - 84)  BP: 88/37 (08 Dec 2021 15:10) (80/32 - 93/35)  BP(mean): --  RR: 18 (08 Dec 2021 15:10) (18 - 18)  SpO2: 100% (08 Dec 2021 15:10) (100% - 100%)  Daily Height in cm: 165.1 (08 Dec 2021 10:56)    Daily       HEENT:  NC/AT,  conjunctivae clear and pink, no thyromegaly, nodules, adenopathy, no JVD  Chest:  Full & symmetric excursion, no increased effort, breath sounds clear  Cardiovascular:  Regular rhythm, S1, S2, no murmur/rub/S3/S4, no abdominal bruit, no edema  Abdomen:  Soft, non-tender, non-distended, normoactive bowel sounds, peg c/d/i  ext edema    Laboratory:                            5.2    12.18 )-----------( 160      ( 08 Dec 2021 11:58 )             16.9     12-08    127<L>  |  88<L>  |  89<H>  ----------------------------<  148<H>  3.9   |  30  |  2.00<H>    Ca    8.5      08 Dec 2021 11:58    TPro  6.8  /  Alb  2.1<L>  /  TBili  0.5  /  DBili  x   /  AST  39  /  ALT  27  /  AlkPhos  103  12-08    LIVER FUNCTIONS - ( 08 Dec 2021 11:58 )  Alb: 2.1 g/dL / Pro: 6.8 g/dL / ALK PHOS: 103 U/L / ALT: 27 U/L DA / AST: 39 U/L / GGT: x           PT/INR - ( 08 Dec 2021 11:58 )   PT: 13.9 sec;   INR: 1.16 ratio         PTT - ( 08 Dec 2021 11:58 )  PTT:33.8 sec  Urinalysis Basic - ( 08 Dec 2021 14:24 )    Color: Yellow / Appearance: Clear / S.010 / pH: x  Gluc: x / Ketone: Negative  / Bili: Negative / Urobili: Negative mg/dL   Blood: x / Protein: 30 mg/dL / Nitrite: Negative   Leuk Esterase: Moderate / RBC: 0-2 /HPF / WBC 3-5   Sq Epi: x / Non Sq Epi: x / Bacteria: Occasional        Imaging:      Assessment:      Plan:

## 2021-12-08 NOTE — ED PROVIDER NOTE - OBJECTIVE STATEMENT
77 yo F sent from [facility] [place] home with abnormal labs. Patient unable to contribute to history. Had labs drawn this afternoon and reportedly has a Hgb of 6. Patient does not have a vaughn catheter. No report of fever, vomiting, bleeding. Receiving IV Zosyn through PICC line rt arm  PCP Desirae

## 2021-12-08 NOTE — ED ADULT NURSE NOTE - NURSING ED SKIN COLOR
Patient returned call to anticoag clinic.  She was started on antibiotics and prednisone.  She has completed the antibiotics but has not completed the prednisone.  Patient took 3 mg on Friday 11/22--plan was to decrease Friday dose but anticoag clinic was unable to reach patient.  Dose reduced to 2 mg Mon, Wed, Fri and 3 mg all other days for this week and then to 2 mg every M, F; 3 mg all other days.    Teaching:  Call your physician or seek medical care immediately if you notice any of the following symptoms of a bleed:   Red, dark, coffee or cola colored urine  Red or tar like stools  Excessive bleeding from gums or nose  Vomiting coffee colored or bright red material  Coughing up red tinged sputum  Severe or unprovoked pain (ex: severe HA or Abd pain)  Sudden, spontaneous bruising for no reason  A cut that will not stop bleeding within 10-15 mins  Symptoms associated with abnormal bleeding/high INR reviewed    Anticoagulation Summary  As of 11/22/2019    INR goal:   2.0-3.0   TTR:   53.0 % (3.3 y)   INR used for dosing:   3.4! (11/22/2019)   Warfarin maintenance plan:   2 mg (2 mg x 1) every M, F; 3 mg (2 mg x 1.5) all other days   Weekly warfarin total:   19 mg   Plan last modified:   Mariia Agrawal RN (11/25/2019)   Next INR check:   12/6/2019   Priority:   Follow-Up - 2 Weeks   Target end date:   Indefinite    Indications    Anticoagulated on Coumadin [Z79.01]  Atrial fibrillation status post cardioversion (CMS/[facility]) [I48.91]  Persistent atrial fibrillation and Atypical Atrial Flutter [I48.91  I48.92]             Anticoagulation Episode Summary     INR check location:       Preferred lab:       Send INR reminders to:   [facility]    Comments:   Home  # [phone].Speak in terms of pills.        Anticoagulation Care Providers     Provider Role Specialty Phone number    Delvis Pabon MD Referring Internal Medicine - Clinical Cardiac Electrophysiology [phone]         pale

## 2021-12-08 NOTE — H&P ADULT - HISTORY OF PRESENT ILLNESS
***Patient with dementia and is not responsive.  Collateral information obtained from chart and notes.***    75F with chronic resp failure - vent dependent, hx of subarachnoid hemorrhage, hx of cardiac arrest, dementia s/p PEG, HTN, DM2 on insulin, hx of CVA, GERD, COPD, admission here from 9/25 to 10/4 and 11/7-11/11 for respiratory failure/sepsis possibly 2/2 aspiration vs vent associated PNA who presents from [facility] for abnormal labs.  Patient does not contribute to history. In the ED, patient's initial triage vitals were BP 88/37, HR 81, RR 18, 100% on vent and T 99 F.  Labs showed leukocytosis of 12.18, HGB 5.2, BUN/Cr 89/2.00. CXR: Tracheostomy cannula reidentified in position. No change heart mediastinum. Widespread bilateral airspace disease slightly improved.  correlate for pulmonary edema and/or infection. No significant pleural effusion. No pneumothorax. Patient's hand projects over portion of the right base. CT chest and A/P pending

## 2021-12-08 NOTE — H&P ADULT - NSHPPHYSICALEXAM_GEN_ALL_CORE
Vitals:  T(C): 37.2 (12-08-21 @ 15:10), Max: 37.9 (12-08-21 @ 10:56)  HR: 81 (12-08-21 @ 15:10) (80 - 84)  BP: 88/37 (12-08-21 @ 15:10) (80/32 - 93/35)  RR: 18 (12-08-21 @ 15:10) (18 - 18)  SpO2: 100% (12-08-21 @ 15:10) (100% - 100%)    PHYSICAL EXAM:  GENERAL: chronically ill appearing, emaciated, NAD, obtunded  HEAD:  atraumatic  EYES: conjunctiva clear  NECK: +trach in place, clean  RESPIRATORY:   coarse mechanical breath sounds, no gross crackles or rales  CARDIOVASCULAR:  regular rate and rhythm, no murmurs or rubs or gallops, 2+ peripheral pulses  GASTROINTESTINAL:   soft, +PEG in place, clean and dry as well, nondistended, no hepatosplenomegaly palpated, bowel sounds present  EXTREMITIES:     no clubbing or cyanosis or edema  MUSCULOSKELETAL:     +diffuse contractures in all joints  NERVOUS SYSTEM: does not respond to pain  SKIN:     necrotic tips of bilateral 1st toes

## 2021-12-08 NOTE — ED PROVIDER NOTE - CARE PLAN
Principal Discharge DX:	Anemia  Secondary Diagnosis:	Sepsis  Secondary Diagnosis:	H/O tracheostomy   1

## 2021-12-08 NOTE — H&P ADULT - ASSESSMENT
75F with chronic resp failure - vent dependent, hx of subarachnoid hemorrhage, hx of cardiac arrest, dementia s/p PEG, HTN, DM2 on insulin, hx of CVA, GERD, COPD, admission here from 9/25 to 10/4 and 11/7-11/11 for respiratory failure/sepsis possibly 2/2 aspiration vs vent associated PNA who presents from [facility] for abnormal labs.    # RYAN   Baseline creatinine .9  Avoid nephrotoxic meds  gentle hydration with ns   repeat BMP in AM  Strict I&O's  Nephrology consulted Dr. Reddy     #Anemia   Has hx of anemia on previous hospitalizations  Occult ordered   GI consulted Dr. Nieves  Ordered for 2 units PRBC on admission     #Hyponatremia  Gentle hydration with ns  Trend bmp  Nephro consulted    # leukocytosis:  - On empiric abx with Vancomycin and Zosyn   - ID consulted Dr. Olivas  - Follow blood and urine cultures  - Follow CT chest and A/P     #COPD:  - c/w albuterol  - pulm (Jaime), recs appreciated    #GERD:  - c/w carafate and PPI     # VTE ppx:  - SCD's for now, restart chemical DVT ppx when cleared by GI as patient is at high thrombotic risk

## 2021-12-08 NOTE — ED ADULT NURSE NOTE - NSIMPLEMENTINTERV_GEN_ALL_ED
Implemented All Fall with Harm Risk Interventions:  [place] to call system. Call bell, personal items and telephone within reach. Instruct patient to call for assistance. Room bathroom lighting operational. Non-slip footwear when patient is off stretcher. Physically safe environment: no spills, clutter or unnecessary equipment. Stretcher in lowest position, wheels locked, appropriate side rails in place. Provide visual cue, wrist band, yellow gown, etc. Monitor gait and stability. Monitor for mental status changes and reorient to person, place, and time. Review medications for side effects contributing to fall risk. Reinforce activity limits and safety measures with patient and family. Provide visual clues: red socks.

## 2021-12-08 NOTE — ED ADULT NURSE NOTE - OBJECTIVE STATEMENT
75 yo F sent from [facility] [place] with abnormal labs. Patient unable to contribute to history. Had labs drawn this afternoon and reportedly has a Hgb of 6. Patient does not have a vaughn catheter. No report of fever, vomiting, bleeding.

## 2021-12-08 NOTE — ED PROVIDER NOTE - ENMT, MLM
Airway patent, Nasal mucosa clear. Mouth with normal mucosa. Throat has no vesicles, no oropharyngeal exudates and uvula is midline. Patent trach in ant neck

## 2021-12-08 NOTE — CONSULT NOTE ADULT - SUBJECTIVE AND OBJECTIVE BOX
Patient is a 76y Female whom presented to the hospital with ckd and manasa     PAST MEDICAL & SURGICAL HISTORY:  Dementia of frontal lobe type    Aphasic stroke    Diabetes mellitus    Respiratory failure    Hypertension    GERD (gastroesophageal reflux disease)    Constipation    Respiratory failure    CVA (cerebral vascular accident)    HTN (hypertension)    DM (diabetes mellitus)    Advanced dementia    COVID-19 virus detected    Quadriplegia    Pneumonia    Type II diabetes mellitus    Hx of appendectomy    Gastrostomy in place    Tracheostomy in place    Tracheostomy tube present    Feeding by G-tube        MEDICATIONS  (STANDING):  ALBUTerol    90 MICROgram(s) HFA Inhaler 2 Puff(s) Inhalation every 6 hours  chlorhexidine 0.12% Liquid 15 milliLiter(s) Oral Mucosa every 12 hours  dextrose 40% Gel 15 Gram(s) Oral once  dextrose 5%. 1000 milliLiter(s) (50 mL/Hr) IV Continuous <Continuous>  dextrose 5%. 1000 milliLiter(s) (100 mL/Hr) IV Continuous <Continuous>  dextrose 50% Injectable 25 Gram(s) IV Push once  dextrose 50% Injectable 12.5 Gram(s) IV Push once  dextrose 50% Injectable 25 Gram(s) IV Push once  glucagon  Injectable 1 milliGRAM(s) IntraMuscular once  insulin lispro (ADMELOG) corrective regimen sliding scale   SubCutaneous every 6 hours  lactated ringers. 1000 milliLiter(s) (100 mL/Hr) IV Continuous <Continuous>  methocarbamol 250 milliGRAM(s) Oral two times a day  pantoprazole  Injectable 40 milliGRAM(s) IV Push every 12 hours  piperacillin/tazobactam IVPB.. 3.375 Gram(s) IV Intermittent every 8 hours  polyethylene glycol 3350 17 Gram(s) Oral every 12 hours  senna 2 Tablet(s) Oral at bedtime  simethicone 80 milliGRAM(s) Chew every 6 hours  sucralfate 1 Gram(s) Oral every 6 hours  tiotropium 18 MICROgram(s) Capsule 1 Capsule(s) Inhalation daily  vancomycin  IVPB 750 milliGRAM(s) IV Intermittent every 12 hours      Allergies    codeine (Hives)    Intolerances        SOCIAL HISTORY:  Denies ETOh,Smoking,     FAMILY HISTORY:  No pertinent family history in first degree relatives        REVIEW OF SYSTEMS:    unable to obtained a good review system      VITAL:  T(C): , Max: 38.6 (21 @ 16:45)  T(F): , Max: 101.5 (21 @ 16:45)  HR: 81 (21 @ 22:15)  BP: 88/45 (21 @ 22:15)  BP(mean): 59 (21 @ 22:15)  RR: 16 (21 @ 22:15)  SpO2: 92% (21 @ 22:15)  Wt(kg): --    I and O's:    Height (cm): 165.1 ( @ 10:56)  Weight (kg): 44.3 ( 22:15)  BMI (kg/m2): 16.3 (:15)  BSA (m2): 1.46 ( @ :15)    PHYSICAL EXAM:    Constitutional: NAD  HEENT: conjunctive   clear   Neck:  No JVD  Respiratory: pos decrease bs pos trach  Cardiovascular: S1 and S2  Gastrointestinal: BS+, soft, pos peg   Extremities: No peripheral edema    LABS:                        9.7    6.92  )-----------( 146      ( 08 Dec 2021 23:10 )             30.9         129<L>  |  92<L>  |  80<H>  ----------------------------<  99  3.7   |  27  |  1.65<H>    Ca    8.3<L>      08 Dec 2021 23:10    TPro  6.8  /  Alb  2.1<L>  /  TBili  0.5  /  DBili  x   /  AST  39  /  ALT  27  /  AlkPhos  103  08      Urine Studies:  Urinalysis Basic - ( 08 Dec 2021 14:24 )    Color: Yellow / Appearance: Clear / S.010 / pH: x  Gluc: x / Ketone: Negative  / Bili: Negative / Urobili: Negative mg/dL   Blood: x / Protein: 30 mg/dL / Nitrite: Negative   Leuk Esterase: Moderate / RBC: 0-2 /HPF / WBC 3-5   Sq Epi: x / Non Sq Epi: x / Bacteria: Occasional            RADIOLOGY & ADDITIONAL STUDIES:

## 2021-12-09 DIAGNOSIS — E11.65 TYPE 2 DIABETES MELLITUS WITH HYPERGLYCEMIA: ICD-10-CM

## 2021-12-09 LAB
A1C WITH ESTIMATED AVERAGE GLUCOSE RESULT: 6.2 % — HIGH (ref 4–5.6)
ALBUMIN SERPL ELPH-MCNC: 2 G/DL — LOW (ref 3.3–5)
ALP SERPL-CCNC: 313 U/L — HIGH (ref 30–120)
ALT FLD-CCNC: 49 U/L DA — SIGNIFICANT CHANGE UP (ref 10–60)
ANION GAP SERPL CALC-SCNC: 8 MMOL/L — SIGNIFICANT CHANGE UP (ref 5–17)
AST SERPL-CCNC: 54 U/L — HIGH (ref 10–40)
BILIRUB SERPL-MCNC: 1 MG/DL — SIGNIFICANT CHANGE UP (ref 0.2–1.2)
BUN SERPL-MCNC: 69 MG/DL — HIGH (ref 7–23)
CALCIUM SERPL-MCNC: 8.3 MG/DL — LOW (ref 8.4–10.5)
CHLORIDE SERPL-SCNC: 95 MMOL/L — LOW (ref 96–108)
CO2 SERPL-SCNC: 28 MMOL/L — SIGNIFICANT CHANGE UP (ref 22–31)
CREAT SERPL-MCNC: 1.52 MG/DL — HIGH (ref 0.5–1.3)
CULTURE RESULTS: SIGNIFICANT CHANGE UP
ESTIMATED AVERAGE GLUCOSE: 131 MG/DL — HIGH (ref 68–114)
GLUCOSE BLDC GLUCOMTR-MCNC: 102 MG/DL — HIGH (ref 70–99)
GLUCOSE BLDC GLUCOMTR-MCNC: 104 MG/DL — HIGH (ref 70–99)
GLUCOSE BLDC GLUCOMTR-MCNC: 105 MG/DL — HIGH (ref 70–99)
GLUCOSE BLDC GLUCOMTR-MCNC: 112 MG/DL — HIGH (ref 70–99)
GLUCOSE BLDC GLUCOMTR-MCNC: 143 MG/DL — HIGH (ref 70–99)
GLUCOSE SERPL-MCNC: 91 MG/DL — SIGNIFICANT CHANGE UP (ref 70–99)
HCT VFR BLD CALC: 27.1 % — LOW (ref 34.5–45)
HCT VFR BLD CALC: 27.7 % — LOW (ref 34.5–45)
HGB BLD-MCNC: 8.6 G/DL — LOW (ref 11.5–15.5)
HGB BLD-MCNC: 8.7 G/DL — LOW (ref 11.5–15.5)
INR BLD: 1.1 RATIO — SIGNIFICANT CHANGE UP (ref 0.88–1.16)
LACTATE SERPL-SCNC: 1.4 MMOL/L — SIGNIFICANT CHANGE UP (ref 0.7–2)
MCHC RBC-ENTMCNC: 25.8 PG — LOW (ref 27–34)
MCHC RBC-ENTMCNC: 26.2 PG — LOW (ref 27–34)
MCHC RBC-ENTMCNC: 31.4 GM/DL — LOW (ref 32–36)
MCHC RBC-ENTMCNC: 31.7 GM/DL — LOW (ref 32–36)
MCV RBC AUTO: 82.2 FL — SIGNIFICANT CHANGE UP (ref 80–100)
MCV RBC AUTO: 82.6 FL — SIGNIFICANT CHANGE UP (ref 80–100)
NRBC # BLD: 0 /100 WBCS — SIGNIFICANT CHANGE UP (ref 0–0)
NRBC # BLD: 0 /100 WBCS — SIGNIFICANT CHANGE UP (ref 0–0)
OB PNL STL: POSITIVE
PHOSPHATE SERPL-MCNC: 2.7 MG/DL — SIGNIFICANT CHANGE UP (ref 2.5–4.5)
PLATELET # BLD AUTO: 153 K/UL — SIGNIFICANT CHANGE UP (ref 150–400)
PLATELET # BLD AUTO: 183 K/UL — SIGNIFICANT CHANGE UP (ref 150–400)
POTASSIUM SERPL-MCNC: 3.6 MMOL/L — SIGNIFICANT CHANGE UP (ref 3.5–5.3)
POTASSIUM SERPL-SCNC: 3.6 MMOL/L — SIGNIFICANT CHANGE UP (ref 3.5–5.3)
PROCALCITONIN SERPL-MCNC: 6.72 NG/ML — HIGH (ref 0.02–0.1)
PROT SERPL-MCNC: 6.5 G/DL — SIGNIFICANT CHANGE UP (ref 6–8.3)
PROTHROM AB SERPL-ACNC: 13.2 SEC — SIGNIFICANT CHANGE UP (ref 10.6–13.6)
RBC # BLD: 3.28 M/UL — LOW (ref 3.8–5.2)
RBC # BLD: 3.37 M/UL — LOW (ref 3.8–5.2)
RBC # FLD: 17.2 % — HIGH (ref 10.3–14.5)
RBC # FLD: 17.3 % — HIGH (ref 10.3–14.5)
SODIUM SERPL-SCNC: 131 MMOL/L — LOW (ref 135–145)
SPECIMEN SOURCE: SIGNIFICANT CHANGE UP
WBC # BLD: 8.82 K/UL — SIGNIFICANT CHANGE UP (ref 3.8–10.5)
WBC # BLD: 9.43 K/UL — SIGNIFICANT CHANGE UP (ref 3.8–10.5)
WBC # FLD AUTO: 8.82 K/UL — SIGNIFICANT CHANGE UP (ref 3.8–10.5)
WBC # FLD AUTO: 9.43 K/UL — SIGNIFICANT CHANGE UP (ref 3.8–10.5)

## 2021-12-09 PROCEDURE — 99233 SBSQ HOSP IP/OBS HIGH 50: CPT

## 2021-12-09 RX ORDER — SODIUM CHLORIDE 9 MG/ML
500 INJECTION, SOLUTION INTRAVENOUS ONCE
Refills: 0 | Status: COMPLETED | OUTPATIENT
Start: 2021-12-09 | End: 2021-12-09

## 2021-12-09 RX ORDER — MIDODRINE HYDROCHLORIDE 2.5 MG/1
5 TABLET ORAL ONCE
Refills: 0 | Status: COMPLETED | OUTPATIENT
Start: 2021-12-09 | End: 2021-12-09

## 2021-12-09 RX ORDER — MIDODRINE HYDROCHLORIDE 2.5 MG/1
5 TABLET ORAL EVERY 8 HOURS
Refills: 0 | Status: DISCONTINUED | OUTPATIENT
Start: 2021-12-09 | End: 2021-12-12

## 2021-12-09 RX ORDER — MIDODRINE HYDROCHLORIDE 2.5 MG/1
5 TABLET ORAL EVERY 8 HOURS
Refills: 0 | Status: DISCONTINUED | OUTPATIENT
Start: 2021-12-09 | End: 2021-12-09

## 2021-12-09 RX ORDER — SODIUM CHLORIDE 9 MG/ML
1000 INJECTION INTRAMUSCULAR; INTRAVENOUS; SUBCUTANEOUS
Refills: 0 | Status: DISCONTINUED | OUTPATIENT
Start: 2021-12-09 | End: 2021-12-10

## 2021-12-09 RX ADMIN — METHOCARBAMOL 250 MILLIGRAM(S): 500 TABLET, FILM COATED ORAL at 06:24

## 2021-12-09 RX ADMIN — ALBUTEROL 2 PUFF(S): 90 AEROSOL, METERED ORAL at 07:11

## 2021-12-09 RX ADMIN — Medication 1 GRAM(S): at 06:15

## 2021-12-09 RX ADMIN — CHLORHEXIDINE GLUCONATE 15 MILLILITER(S): 213 SOLUTION TOPICAL at 18:31

## 2021-12-09 RX ADMIN — Medication 1 GRAM(S): at 11:46

## 2021-12-09 RX ADMIN — MIDODRINE HYDROCHLORIDE 5 MILLIGRAM(S): 2.5 TABLET ORAL at 13:11

## 2021-12-09 RX ADMIN — PANTOPRAZOLE SODIUM 40 MILLIGRAM(S): 20 TABLET, DELAYED RELEASE ORAL at 06:15

## 2021-12-09 RX ADMIN — SIMETHICONE 80 MILLIGRAM(S): 80 TABLET, CHEWABLE ORAL at 06:15

## 2021-12-09 RX ADMIN — MIDODRINE HYDROCHLORIDE 5 MILLIGRAM(S): 2.5 TABLET ORAL at 00:53

## 2021-12-09 RX ADMIN — SIMETHICONE 80 MILLIGRAM(S): 80 TABLET, CHEWABLE ORAL at 11:46

## 2021-12-09 RX ADMIN — Medication 1 GRAM(S): at 00:19

## 2021-12-09 RX ADMIN — PIPERACILLIN AND TAZOBACTAM 25 GRAM(S): 4; .5 INJECTION, POWDER, LYOPHILIZED, FOR SOLUTION INTRAVENOUS at 13:11

## 2021-12-09 RX ADMIN — SODIUM CHLORIDE 75 MILLILITER(S): 9 INJECTION INTRAMUSCULAR; INTRAVENOUS; SUBCUTANEOUS at 07:38

## 2021-12-09 RX ADMIN — PIPERACILLIN AND TAZOBACTAM 25 GRAM(S): 4; .5 INJECTION, POWDER, LYOPHILIZED, FOR SOLUTION INTRAVENOUS at 21:51

## 2021-12-09 RX ADMIN — POLYETHYLENE GLYCOL 3350 17 GRAM(S): 17 POWDER, FOR SOLUTION ORAL at 06:15

## 2021-12-09 RX ADMIN — ALBUTEROL 2 PUFF(S): 90 AEROSOL, METERED ORAL at 00:39

## 2021-12-09 RX ADMIN — Medication 1 GRAM(S): at 18:30

## 2021-12-09 RX ADMIN — Medication 250 MILLIGRAM(S): at 00:19

## 2021-12-09 RX ADMIN — SIMETHICONE 80 MILLIGRAM(S): 80 TABLET, CHEWABLE ORAL at 23:01

## 2021-12-09 RX ADMIN — Medication 1 GRAM(S): at 23:01

## 2021-12-09 RX ADMIN — Medication 1 MILLIGRAM(S): at 07:40

## 2021-12-09 RX ADMIN — MIDODRINE HYDROCHLORIDE 5 MILLIGRAM(S): 2.5 TABLET ORAL at 21:51

## 2021-12-09 RX ADMIN — SIMETHICONE 80 MILLIGRAM(S): 80 TABLET, CHEWABLE ORAL at 18:30

## 2021-12-09 RX ADMIN — SODIUM CHLORIDE 3000 MILLILITER(S): 9 INJECTION, SOLUTION INTRAVENOUS at 01:01

## 2021-12-09 RX ADMIN — PANTOPRAZOLE SODIUM 40 MILLIGRAM(S): 20 TABLET, DELAYED RELEASE ORAL at 18:29

## 2021-12-09 RX ADMIN — ALBUTEROL 2 PUFF(S): 90 AEROSOL, METERED ORAL at 14:34

## 2021-12-09 RX ADMIN — CHLORHEXIDINE GLUCONATE 15 MILLILITER(S): 213 SOLUTION TOPICAL at 06:15

## 2021-12-09 RX ADMIN — ALBUTEROL 2 PUFF(S): 90 AEROSOL, METERED ORAL at 20:36

## 2021-12-09 RX ADMIN — METHOCARBAMOL 250 MILLIGRAM(S): 500 TABLET, FILM COATED ORAL at 18:30

## 2021-12-09 RX ADMIN — PIPERACILLIN AND TAZOBACTAM 25 GRAM(S): 4; .5 INJECTION, POWDER, LYOPHILIZED, FOR SOLUTION INTRAVENOUS at 06:15

## 2021-12-09 RX ADMIN — MIDODRINE HYDROCHLORIDE 5 MILLIGRAM(S): 2.5 TABLET ORAL at 06:15

## 2021-12-09 NOTE — DIETITIAN INITIAL EVALUATION ADULT. - PERTINENT MEDS FT
MEDICATIONS  (STANDING):  ALBUTerol    90 MICROgram(s) HFA Inhaler 2 Puff(s) Inhalation every 6 hours  chlorhexidine 0.12% Liquid 15 milliLiter(s) Oral Mucosa every 12 hours  dextrose 40% Gel 15 Gram(s) Oral once  dextrose 5%. 1000 milliLiter(s) (50 mL/Hr) IV Continuous <Continuous>  dextrose 5%. 1000 milliLiter(s) (100 mL/Hr) IV Continuous <Continuous>  dextrose 50% Injectable 25 Gram(s) IV Push once  dextrose 50% Injectable 12.5 Gram(s) IV Push once  dextrose 50% Injectable 25 Gram(s) IV Push once  glucagon  Injectable 1 milliGRAM(s) IntraMuscular once  insulin lispro (ADMELOG) corrective regimen sliding scale   SubCutaneous every 6 hours  methocarbamol 250 milliGRAM(s) Oral two times a day  midodrine 5 milliGRAM(s) Oral every 8 hours  pantoprazole  Injectable 40 milliGRAM(s) IV Push every 12 hours  piperacillin/tazobactam IVPB.. 3.375 Gram(s) IV Intermittent every 8 hours  polyethylene glycol 3350 17 Gram(s) Oral every 12 hours  senna 2 Tablet(s) Oral at bedtime  simethicone 80 milliGRAM(s) Chew every 6 hours  sodium chloride 0.9%. 1000 milliLiter(s) (75 mL/Hr) IV Continuous <Continuous>  sucralfate suspension 1 Gram(s) Enteral Tube every 6 hours  tiotropium 18 MICROgram(s) Capsule 1 Capsule(s) Inhalation daily    MEDICATIONS  (PRN):  acetaminophen     Tablet .. 650 milliGRAM(s) Oral every 6 hours PRN Temp greater or equal to 38C (100.4F), Mild Pain (1 - 3)  LORazepam     Tablet 1 milliGRAM(s) Oral every 8 hours PRN Agitation

## 2021-12-09 NOTE — PATIENT PROFILE ADULT - CENTRAL VENOUS CATHETER/PICC LINE
no

## 2021-12-09 NOTE — PROGRESS NOTE ADULT - SUBJECTIVE AND OBJECTIVE BOX
Patient is a 76y Female whom presented to the hospital with ckd and manasa     PAST MEDICAL & SURGICAL HISTORY:  Dementia of frontal lobe type    Aphasic stroke    Diabetes mellitus    Respiratory failure    Hypertension    GERD (gastroesophageal reflux disease)    Constipation    Respiratory failure    CVA (cerebral vascular accident)    HTN (hypertension)    DM (diabetes mellitus)    Advanced dementia    COVID-19 virus detected    Quadriplegia    Pneumonia    Type II diabetes mellitus    Hx of appendectomy    Gastrostomy in place    Tracheostomy in place    Tracheostomy tube present    Feeding by G-tube        MEDICATIONS  (STANDING):  ALBUTerol    90 MICROgram(s) HFA Inhaler 2 Puff(s) Inhalation every 6 hours  chlorhexidine 0.12% Liquid 15 milliLiter(s) Oral Mucosa every 12 hours  dextrose 40% Gel 15 Gram(s) Oral once  dextrose 5%. 1000 milliLiter(s) (50 mL/Hr) IV Continuous <Continuous>  dextrose 5%. 1000 milliLiter(s) (100 mL/Hr) IV Continuous <Continuous>  dextrose 50% Injectable 25 Gram(s) IV Push once  dextrose 50% Injectable 12.5 Gram(s) IV Push once  dextrose 50% Injectable 25 Gram(s) IV Push once  glucagon  Injectable 1 milliGRAM(s) IntraMuscular once  insulin lispro (ADMELOG) corrective regimen sliding scale   SubCutaneous every 6 hours  lactated ringers. 1000 milliLiter(s) (100 mL/Hr) IV Continuous <Continuous>  methocarbamol 250 milliGRAM(s) Oral two times a day  pantoprazole  Injectable 40 milliGRAM(s) IV Push every 12 hours  piperacillin/tazobactam IVPB.. 3.375 Gram(s) IV Intermittent every 8 hours  polyethylene glycol 3350 17 Gram(s) Oral every 12 hours  senna 2 Tablet(s) Oral at bedtime  simethicone 80 milliGRAM(s) Chew every 6 hours  sucralfate 1 Gram(s) Oral every 6 hours  tiotropium 18 MICROgram(s) Capsule 1 Capsule(s) Inhalation daily  vancomycin  IVPB 750 milliGRAM(s) IV Intermittent every 12 hours      Allergies    codeine (Hives)    Intolerances        SOCIAL HISTORY:  Denies ETOh,Smoking,     FAMILY HISTORY:  No pertinent family history in first degree relatives        REVIEW OF SYSTEMS:    unable to obtained a good review system                            8.6    8.82  )-----------( 153      ( 09 Dec 2021 07:09 )             27.1       CBC Full  -  ( 09 Dec 2021 07:09 )  WBC Count : 8.82 K/uL  RBC Count : 3.28 M/uL  Hemoglobin : 8.6 g/dL  Hematocrit : 27.1 %  Platelet Count - Automated : 153 K/uL  Mean Cell Volume : 82.6 fl  Mean Cell Hemoglobin : 26.2 pg  Mean Cell Hemoglobin Concentration : 31.7 gm/dL  Auto Neutrophil # : x  Auto Lymphocyte # : x  Auto Monocyte # : x  Auto Eosinophil # : x  Auto Basophil # : x  Auto Neutrophil % : x  Auto Lymphocyte % : x  Auto Monocyte % : x  Auto Eosinophil % : x  Auto Basophil % : x          131<L>  |  95<L>  |  69<H>  ----------------------------<  91  3.6   |  28  |  1.52<H>    Ca    8.3<L>      09 Dec 2021 07:09  Phos  2.7         TPro  6.5  /  Alb  2.0<L>  /  TBili  1.0  /  DBili  x   /  AST  54<H>  /  ALT  49  /  AlkPhos  313<H>  12      CAPILLARY BLOOD GLUCOSE      POCT Blood Glucose.: 112 mg/dL (09 Dec 2021 18:27)  POCT Blood Glucose.: 104 mg/dL (09 Dec 2021 11:56)  POCT Blood Glucose.: 102 mg/dL (09 Dec 2021 06:39)  POCT Blood Glucose.: 105 mg/dL (09 Dec 2021 00:18)      Vital Signs Last 24 Hrs  T(C): 37.2 (09 Dec 2021 08:56), Max: 37.2 (09 Dec 2021 08:56)  T(F): 99 (09 Dec 2021 08:56), Max: 99 (09 Dec 2021 08:56)  HR: 78 (09 Dec 2021 20:38) (72 - 83)  BP: 105/46 (09 Dec 2021 18:00) (79/43 - 112/52)  BP(mean): 63 (09 Dec 2021 18:00) (55 - 70)  RR: 20 (09 Dec 2021 18:00) (3 - 29)  SpO2: 99% (09 Dec 2021 20:38) (88% - 100%)    Urinalysis Basic - ( 08 Dec 2021 14:24 )    Color: Yellow / Appearance: Clear / S.010 / pH: x  Gluc: x / Ketone: Negative  / Bili: Negative / Urobili: Negative mg/dL   Blood: x / Protein: 30 mg/dL / Nitrite: Negative   Leuk Esterase: Moderate / RBC: 0-2 /HPF / WBC 3-5   Sq Epi: x / Non Sq Epi: x / Bacteria: Occasional        PT/INR - ( 09 Dec 2021 07:09 )   PT: 13.2 sec;   INR: 1.10 ratio         PTT - ( 08 Dec 2021 11:58 )  PTT:33.8 sec        PHYSICAL EXAM:    Constitutional: NAD  HEENT: conjunctive   clear   Neck:  No JVD  Respiratory: pos decrease bs pos trach  Cardiovascular: S1 and S2  Gastrointestinal: BS+, soft, pos peg   Extremities: No peripheral edema

## 2021-12-09 NOTE — PROGRESS NOTE ADULT - SUBJECTIVE AND OBJECTIVE BOX
[facility]EC[facility]    Allergies    codeine (Hives)    Intolerances        PAST MEDICAL & SURGICAL HISTORY:  Dementia of frontal lobe type    Aphasic stroke    Diabetes mellitus    Respiratory failure    Hypertension    GERD (gastroesophageal reflux disease)    Constipation    Respiratory failure    CVA (cerebral vascular accident)    HTN (hypertension)    DM (diabetes mellitus)    Advanced dementia    COVID-19 virus detected    Quadriplegia    Pneumonia    Type II diabetes mellitus    Hx of appendectomy    Gastrostomy in place    Tracheostomy in place    Tracheostomy tube present    Feeding by G-tube        FAMILY HISTORY:  No pertinent family history in first degree relatives        Home Medications:  albuterol 90 mcg/inh inhalation aerosol: 2 puff(s) inhaled every 6 hours (08 Dec 2021 16:51)  Bacid (LAC) oral tablet: 2 tab(s) by gastrostomy tube once a day (08 Dec 2021 16:51)  Carafate 1 g/10 mL oral suspension: 10 milliliter(s) by gastrostomy tube 4 times a day (before meals and at bedtime) for 14 days (Started 21) (08 Dec 2021 16:51)  chlorhexidine 0.12% mucous membrane liquid: 15 milliliter(s) mucous membrane 2 times a day (08 Dec 2021 16:51)  insulin lispro 100 units/mL injectable solution:  injectable; sliding scale coverage  (08 Dec 2021 16:51)  ipratropium-albuterol 0.5 mg-2.5 mg/3 mL inhalation solution: 3 milliliter(s) inhaled 4 times a day (08 Dec 2021 16:51)  Lantus 100 units/mL subcutaneous solution: 36 unit(s) subcutaneous once a day (at bedtime) (08 Dec 2021 16:51)  LORazepam 1 mg oral tablet: 1 tab(s) by gastrostomy tube 3 times a day, As Needed (08 Dec 2021 16:51)  methocarbamol: 250 milligram(s) by gastrostomy tube 2 times a day (08 Dec 2021 16:51)  pantoprazole 40 mg oral granule, delayed release: 40 milligram(s) by gastrostomy tube 2 times a day (08 Dec 2021 16:51)  polyethylene glycol 3350 oral powder for reconstitution: 17 gram(s) orally every 12 hours (08 Dec 2021 16:51)  ProAir HFA 90 mcg/inh inhalation aerosol: 2 puff(s) inhaled every 6 hours (08 Dec 2021 16:51)  senna 8.6 mg oral tablet: 3 tab(s) by gastrostomy tube once a day (at bedtime) (08 Dec 2021 16:51)  simethicone 80 mg oral tablet, chewable: 1 tab(s) orally every 6 hours (08 Dec 2021 16:51)  Tylenol 325 mg oral tablet: 2 tab(s) by gastrostomy tube once a day; 60 minutes prior to dressing change  (08 Dec 2021 16:51)  Zosyn 3 g-0.375 g/50 mL intravenous solution: intravenous every 8 hours (08 Dec 2021 16:51)      MEDICATIONS  (STANDING):  ALBUTerol    90 MICROgram(s) HFA Inhaler 2 Puff(s) Inhalation every 6 hours  chlorhexidine 0.12% Liquid 15 milliLiter(s) Oral Mucosa every 12 hours  dextrose 40% Gel 15 Gram(s) Oral once  dextrose 5%. 1000 milliLiter(s) (50 mL/Hr) IV Continuous <Continuous>  dextrose 5%. 1000 milliLiter(s) (100 mL/Hr) IV Continuous <Continuous>  dextrose 50% Injectable 25 Gram(s) IV Push once  dextrose 50% Injectable 12.5 Gram(s) IV Push once  dextrose 50% Injectable 25 Gram(s) IV Push once  glucagon  Injectable 1 milliGRAM(s) IntraMuscular once  insulin lispro (ADMELOG) corrective regimen sliding scale   SubCutaneous every 6 hours  methocarbamol 250 milliGRAM(s) Oral two times a day  midodrine 5 milliGRAM(s) Oral every 8 hours  pantoprazole  Injectable 40 milliGRAM(s) IV Push every 12 hours  piperacillin/tazobactam IVPB.. 3.375 Gram(s) IV Intermittent every 8 hours  polyethylene glycol 3350 17 Gram(s) Oral every 12 hours  senna 2 Tablet(s) Oral at bedtime  simethicone 80 milliGRAM(s) Chew every 6 hours  sodium chloride 0.9%. 1000 milliLiter(s) (75 mL/Hr) IV Continuous <Continuous>  sucralfate suspension 1 Gram(s) Enteral Tube every 6 hours  tiotropium 18 MICROgram(s) Capsule 1 Capsule(s) Inhalation daily  vancomycin  IVPB 750 milliGRAM(s) IV Intermittent every 12 hours    MEDICATIONS  (PRN):  acetaminophen     Tablet .. 650 milliGRAM(s) Oral every 6 hours PRN Temp greater or equal to 38C (100.4F), Mild Pain (1 - 3)  LORazepam     Tablet 1 milliGRAM(s) Oral every 8 hours PRN Agitation      Diet, NPO (21 @ 16:34) [Active]          Vital Signs Last 24 Hrs  T(C): 36.8 (09 Dec 2021 04:00), Max: 38.6 (08 Dec 2021 16:45)  T(F): 98.2 (09 Dec 2021 04:00), Max: 101.5 (08 Dec 2021 16:45)  HR: 76 (09 Dec 2021 07:44) (74 - 84)  BP: 100/47 (09 Dec 2021 06:00) (79/43 - 103/57)  BP(mean): 62 (09 Dec 2021 06:00) (55 - 70)  RR: 26 (09 Dec 2021 06:00) (14 - 29)  SpO2: 96% (09 Dec 2021 07:44) (88% - 100%)      21 @ 07:01  -  21 @ 07:00  --------------------------------------------------------  IN: 1625 mL / OUT: 900 mL / NET: 725 mL        Mode: AC/ CMV (Assist Control/ Continuous Mandatory Ventilation), RR (machine): 18, TV (machine): 400, FiO2: 40, PEEP: 5, ITime: 1, MAP: 9, PIP: 22      LABS:                        8.6    8.82  )-----------( 153      ( 09 Dec 2021 07:09 )             27.1         131<L>  |  95<L>  |  69<H>  ----------------------------<  91  3.6   |  28  |  1.52<H>    Ca    8.3<L>      09 Dec 2021 07:09  Phos  2.7         TPro  6.5  /  Alb  2.0<L>  /  TBili  1.0  /  DBili  x   /  AST  54<H>  /  ALT  49  /  AlkPhos  313<H>      PT/INR - ( 09 Dec 2021 07:09 )   PT: 13.2 sec;   INR: 1.10 ratio         PTT - ( 08 Dec 2021 11:58 )  PTT:33.8 sec  Urinalysis Basic - ( 08 Dec 2021 14:24 )    Color: Yellow / Appearance: Clear / S.010 / pH: x  Gluc: x / Ketone: Negative  / Bili: Negative / Urobili: Negative mg/dL   Blood: x / Protein: 30 mg/dL / Nitrite: Negative   Leuk Esterase: Moderate / RBC: 0-2 /HPF / WBC 3-5   Sq Epi: x / Non Sq Epi: x / Bacteria: Occasional            WBC:  WBC Count: 8.82 K/uL ( @ 07:09)  WBC Count: 6.92 K/uL ( @ 23:10)  WBC Count: 8.30 K/uL ( @ 20:11)  WBC Count: 12.18 K/uL ( @ 11:58)      MICROBIOLOGY:  RECENT CULTURES:              PT/INR - ( 09 Dec 2021 07:09 )   PT: 13.2 sec;   INR: 1.10 ratio         PTT - ( 08 Dec 2021 11:58 )  PTT:33.8 sec    Sodium:  Sodium, Serum: 131 mmol/L ( @ 07:09)  Sodium, Serum: 129 mmol/L ( @ 23:10)  Sodium, Serum: 127 mmol/L (:58)      1.52 mg/dL  @ 07:09  1.65 mg/dL  @ 23:10  2.00 mg/dL  @ :58      Hemoglobin:  Hemoglobin: 8.6 g/dL ( @ 07:09)  Hemoglobin: 9.7 g/dL ( @ 23:10)  Hemoglobin: 8.2 g/dL ( @ 20:11)  Hemoglobin: 5.2 g/dL ( @ 11:58)      Platelets: Platelet Count - Automated: 153 K/uL ( @ 07:09)  Platelet Count - Automated: 146 K/uL ( @ 23:10)  Platelet Count - Automated: 132 K/uL ( @ 20:11)  Platelet Count - Automated: 160 K/uL ( @ 11:58)      LIVER FUNCTIONS - ( 09 Dec 2021 07:09 )  Alb: 2.0 g/dL / Pro: 6.5 g/dL / ALK PHOS: 313 U/L / ALT: 49 U/L DA / AST: 54 U/L / GGT: x             Urinalysis Basic - ( 08 Dec 2021 14:24 )    Color: Yellow / Appearance: Clear / S.010 / pH: x  Gluc: x / Ketone: Negative  / Bili: Negative / Urobili: Negative mg/dL   Blood: x / Protein: 30 mg/dL / Nitrite: Negative   Leuk Esterase: Moderate / RBC: 0-2 /HPF / WBC 3-5   Sq Epi: x / Non Sq Epi: x / Bacteria: Occasional        RADIOLOGY & ADDITIONAL STUDIES:      MICROBIOLOGY:  RECENT CULTURES:

## 2021-12-09 NOTE — PROGRESS NOTE ADULT - ASSESSMENT
The patient is a 76 year old female with a history of HTN, DM, CVA, dementia, chronic respiratory failure s/p trach, PEG, cardiac arrest who presents from NH with anemia.    Plan:  - Recent echo with normal LV systolic function, mild pulm HTN  - Hemoglobin improved with transfusion  - Hold aspirin  - On zosyn and vancomycin  - Mechanical ventilation  - Overall poor prognosis. Comfort care would be most appropriate.

## 2021-12-09 NOTE — DIETITIAN INITIAL EVALUATION ADULT. - PHYSCIAL ASSESSMENT
skin: stage I to BL heels, L great toe, R first toe wound ht 5'5", adm wt 97# (likely bed scale error) as pt weighed 120-124# during last months admission; suspected pt did not have ~25# wt loss x 1 month as pt on tube feeding PTA, which meets EEN

## 2021-12-09 NOTE — DIETITIAN INITIAL EVALUATION ADULT. - ENTERAL
As medically feasible, recommend initiate PEG feedings (Glucerna 1.5, start at 20ml/hr, increase by 10ml every 6hrs until goal 50ml/hr x 20hrs is reached)

## 2021-12-09 NOTE — PROGRESS NOTE ADULT - SUBJECTIVE AND OBJECTIVE BOX
Patient is a 76y old  Female who presents with a chief complaint of Anemia (09 Dec 2021 13:27)      INTERVAL HPI/OVERNIGHT EVENTS: Patient seen and examined at bedside. No overnight events.      MEDICATIONS  (STANDING):  ALBUTerol    90 MICROgram(s) HFA Inhaler 2 Puff(s) Inhalation every 6 hours  chlorhexidine 0.12% Liquid 15 milliLiter(s) Oral Mucosa every 12 hours  dextrose 40% Gel 15 Gram(s) Oral once  dextrose 5%. 1000 milliLiter(s) (50 mL/Hr) IV Continuous <Continuous>  dextrose 5%. 1000 milliLiter(s) (100 mL/Hr) IV Continuous <Continuous>  dextrose 50% Injectable 25 Gram(s) IV Push once  dextrose 50% Injectable 12.5 Gram(s) IV Push once  dextrose 50% Injectable 25 Gram(s) IV Push once  glucagon  Injectable 1 milliGRAM(s) IntraMuscular once  insulin lispro (ADMELOG) corrective regimen sliding scale   SubCutaneous every 6 hours  methocarbamol 250 milliGRAM(s) Oral two times a day  midodrine 5 milliGRAM(s) Oral every 8 hours  pantoprazole  Injectable 40 milliGRAM(s) IV Push every 12 hours  piperacillin/tazobactam IVPB.. 3.375 Gram(s) IV Intermittent every 8 hours  polyethylene glycol 3350 17 Gram(s) Oral every 12 hours  senna 2 Tablet(s) Oral at bedtime  simethicone 80 milliGRAM(s) Chew every 6 hours  sodium chloride 0.9%. 1000 milliLiter(s) (75 mL/Hr) IV Continuous <Continuous>  sucralfate suspension 1 Gram(s) Enteral Tube every 6 hours  tiotropium 18 MICROgram(s) Capsule 1 Capsule(s) Inhalation daily    MEDICATIONS  (PRN):  acetaminophen     Tablet .. 650 milliGRAM(s) Oral every 6 hours PRN Temp greater or equal to 38C (100.4F), Mild Pain (1 - 3)  LORazepam     Tablet 1 milliGRAM(s) Oral every 8 hours PRN Agitation      Allergies    codeine (Hives)    Intolerances        REVIEW OF SYSTEMS:  Unable to obtain meaningful ROS due to mental status      Vital Signs Last 24 Hrs  T(C): 37.2 (09 Dec 2021 08:56), Max: 38.6 (08 Dec 2021 16:45)  T(F): 99 (09 Dec 2021 08:56), Max: 101.5 (08 Dec 2021 16:45)  HR: 73 (09 Dec 2021 14:44) (73 - 84)  BP: 112/52 (09 Dec 2021 14:00) (79/43 - 112/52)  BP(mean): 69 (09 Dec 2021 14:00) (55 - 70)  RR: 21 (09 Dec 2021 14:00) (14 - 29)  SpO2: 95% (09 Dec 2021 14:44) (88% - 100%)    PHYSICAL EXAM:  GENERAL: chronically ill appearing, emaciated, NAD, obtunded  HEAD:  atraumatic  EYES: conjunctiva clear  NECK: +trach in place, clean  RESPIRATORY:  coarse mechanical breath sounds, no gross crackles or rales  CARDIOVASCULAR:  regular rate and rhythm, no murmurs or rubs or gallops, 2+ peripheral pulses  GASTROINTESTINAL:  soft, +PEG in place, clean and dry as well, nondistended, no hepatosplenomegaly palpated, bowel sounds present  EXTREMITIES: no clubbing or cyanosis or edema  MUSCULOSKELETAL:  +diffuse contractures in all joints  NERVOUS SYSTEM: does not respond to pain  SKIN: necrotic tips of bilateral 1st toes    LABS:                        8.6    8.82  )-----------( 153      ( 09 Dec 2021 07:09 )             27.1     CBC Full  -  ( 09 Dec 2021 07:09 )  WBC Count : 8.82 K/uL  Hemoglobin : 8.6 g/dL  Hematocrit : 27.1 %  Platelet Count - Automated : 153 K/uL  Mean Cell Volume : 82.6 fl  Mean Cell Hemoglobin : 26.2 pg  Mean Cell Hemoglobin Concentration : 31.7 gm/dL  Auto Neutrophil # : x  Auto Lymphocyte # : x  Auto Monocyte # : x  Auto Eosinophil # : x  Auto Basophil # : x  Auto Neutrophil % : x  Auto Lymphocyte % : x  Auto Monocyte % : x  Auto Eosinophil % : x  Auto Basophil % : x    09 Dec 2021 07:09    131    |  95     |  69     ----------------------------<  91     3.6     |  28     |  1.52     Ca    8.3        09 Dec 2021 07:09  Phos  2.7       09 Dec 2021 07:09    TPro  6.5    /  Alb  2.0    /  TBili  1.0    /  DBili  x      /  AST  54     /  ALT  49     /  AlkPhos  313    09 Dec 2021 07:09    PT/INR - ( 09 Dec 2021 07:09 )   PT: 13.2 sec;   INR: 1.10 ratio         PTT - ( 08 Dec 2021 11:58 )  PTT:33.8 sec  Urinalysis Basic - ( 08 Dec 2021 14:24 )    Color: Yellow / Appearance: Clear / S.010 / pH: x  Gluc: x / Ketone: Negative  / Bili: Negative / Urobili: Negative mg/dL   Blood: x / Protein: 30 mg/dL / Nitrite: Negative   Leuk Esterase: Moderate / RBC: 0-2 /HPF / WBC 3-5   Sq Epi: x / Non Sq Epi: x / Bacteria: Occasional      CAPILLARY BLOOD GLUCOSE      POCT Blood Glucose.: 104 mg/dL (09 Dec 2021 11:56)  POCT Blood Glucose.: 102 mg/dL (09 Dec 2021 06:39)  POCT Blood Glucose.: 105 mg/dL (09 Dec 2021 00:18)  POCT Blood Glucose.: 115 mg/dL (08 Dec 2021 18:32)        Culture - Urine (collected 21 @ 17:55)  Source: Clean Catch Clean Catch (Midstream)  Final Report (21 @ 13:58):    <10,000 CFU/mL Normal Urogenital Elvira        RADIOLOGY & ADDITIONAL TESTS: < from: CT Abdomen and Pelvis No Cont (21 @ 16:52) >    ACC: [id number] EXAM:  CT ABDOMEN AND PELVIS                        ACC: [id number] EXAM:  CT CHEST                          PROCEDURE DATE:  2021          INTERPRETATION:  CLINICAL INFORMATION: Anemia. Evaluate for bleeding.    COMPARISON: CT chest abdomen pelvis 2021.    CONTRAST/COMPLICATIONS:  IV Contrast: NONE  Oral Contrast: NONE  Complications: None reported at time of study completion    PROCEDURE:  CT of the Chest, Abdomen and Pelvis was performed.  Sagittal and coronal reformats were performed.    FINDINGS:    CHEST:  LUNGS AND LARGE AIRWAYS: Tracheostomy terminates above the parmjit. There   are multifocal lung parenchymal opacities most prominent in dependent   locations, concerning for multifocal infection superimposed ondistal   airways impaction and atelectasis. Most of the opacities are either new   or unchanged since prior study. However, volume loss of the right lower   lobe has improved since 2021. Redemonstrated calcified material   again likely reflects aspirated contents.  PLEURA: Trace right and small left pleural effusions. No pneumothorax.  VESSELS: Normal caliber of the thoracic aorta with atheromatous change.   Main pulmonary artery size is within normal limits.  HEART: Heart size is qualitatively normal. Small pericardial fluid is   similar to prior. There is coronary artery calcification and mitral   calcification. Low attenuation of the blood pool of the heart may reflect   anemia.  MEDIASTINUM AND JHONNY: Prominent/mildly enlarged mediastinal and hilar   nodes are redemonstrated. The esophagus is distended with air probable   left-sided diverticulum at the thyroid level, image 15 series 4, similar   to prior.  CHEST WALL AND LOWER NECK: Symmetric appearance of the chest wall   musculature, without evidence of hematoma. Soft tissue edema is noted.    ABDOMEN AND PELVIS:    Solid organ evaluation limited due to noncontrast technique.    LIVER: Enlarged liver measuring 21 cm in craniocaudal dimension.  BILE DUCTS: No biliary distention.  GALLBLADDER: Cholelithiasis.  SPLEEN: Borderline enlarged measuring 13 cm in craniocaudal dimension.  PANCREAS: No main pancreatic ductal dilatation.  ADRENALS: Unchanged adrenal gland thickening.  KIDNEYS/URETERS: No hydronephrosis. Nonspecific bilateral perinephric   stranding.    BLADDER: Woody catheter. Urinary bladder is underdistended, suboptimally   characterized.  REPRODUCTIVE ORGANS: Uterus and adnexa suboptimally characterized on   noncontrast CT.    BOWEL: Gastrostomy tube terminates in the stomach. Oral contrast seen   within the mildly distended stomach and along small and large bowel   loops. No significant small bowel distention. Rectum is distended with   air and fluid, similar to prior.  PERITONEUM: No ascites, free air, or loculated fluid collection.  VESSELS: No aneurysm of the abdominal aorta. Aortoiliac and proximal   femoral atheromatous change.  RETROPERITONEUM/LYMPH NODES: Small volume nodes.  ABDOMINAL WALL: Symmetric appearance of the abdominal wall and   retroperitoneal musculature. Mild soft tissue edema.  BONES: Chronic fracture deformity of the partially imaged left femur,   with surrounding callus, redemonstrated. Diffuse osseous demineralization   and multilevel degenerative changes limits evaluation of the bones.   Central canal and neural foramina are not adequately assessed on this   study. Fixation hardware of the distal left radius limited in evaluation   due to positioning.    IMPRESSION:    Limited study without IV contrast.    CHEST:  1.Tracheostomy. Distal airways impaction and multifocal pneumonia,   possibly related to aspiration. Most of the opacities are either new or   unchanged since prior study. However, volume loss of the right lower lobe   has improved since 2021. Small left pleural effusion and trace right   pleural effusion, similar to prior. Follow to resolution with repeat   chest CT in 4 weeks.  2. Esophagus is distended with air probable left-sided diverticulum at   the thyroid level, image 15 series 4, similar to prior.  3. Coronary artery calcification and mitral calcification. Low   attenuation of the blood pool of the heart may reflect anemia.    ABDOMEN/PELVIS:  1. Mild chest and abdominal wall soft tissue edema. Symmetric appearance   of the abdominal wall and retroperitoneal musculature, without evidence   of hematoma. Correlate with clinical status of the patient, serial   hemoglobin and hematocrit to determine need for follow-up postcontrast   imaging.  2. No hydronephrosis of the kidneys.    --- End of Report ---            CAYETANO CH M.D., ATTENDING RADIOLOGIST  This document has been electronically signed. Dec  8 2021  5:20PM    < end of copied text >      Consultant(s) Notes Reviewed:  [x] YES  [ ] NO    Care Discussed with [x] Consultants  [x] Patient  [ ] Family  [ ]      [ x] Other; RN  DVT ppx

## 2021-12-09 NOTE — PATIENT PROFILE ADULT - FALL HARM RISK - HARM RISK INTERVENTIONS
Assistance OOB with selected safe patient handling equipment/Communicate Risk of Fall with Harm to all staff/Reinforce activity limits and safety measures with patient and family/Tailored Fall Risk Interventions/Visual Cue: Yellow wristband and red socks/Bed in lowest position, wheels locked, appropriate side rails in place/Call bell, personal items and telephone in reach/Instruct patient to call for assistance before getting out of bed or chair/Non-slip footwear when patient is out of bed/[place] to call system/Physically safe environment - no spills, clutter or unnecessary equipment/Purposeful Proactive Rounding/Room/bathroom lighting operational, light cord in reach

## 2021-12-09 NOTE — PROGRESS NOTE ADULT - ASSESSMENT
CHAPINCITO GUIDRY 75 f [facility] S 12/8/2021   DR ILIANA KEMP     REVIEW OF SYMPTOMS      Able to give (reliable) ROS  NO     PHYSICAL EXAM    HEENT Unremarkable  atraumatic   RESP Fair air entry EXP prolonged    Harsh breath sound Resp distres mild   CARDIAC S1 S2 No S3     NO JVD    ABDOMEN SOFT BS PRESENT NOT DISTENDED No hepatosplenomegaly   PEDAL EDEMA present No calf tenderness  NO rash       ________________  Age DOA CC.  76 year old female with a history of Pneumonia  HTN, DM, CVA, dementia, chronic respiratory failure s/p trach, PEG, cardiac arrest who presented 12/8/2021 from NH with abnormal labs. She was found to have a hemoglobin of 6. No further history could be obtained from patient.  Pulm crit care consulted 12/8/2021   ________  PROBLEM LIST.  PNEUMONIA poa 12/8  VENT MANAGEMENT. VDRF poa   COPD.  HYPOTENSION 12/8  GI BLEED poa 12/8  SEVERE ANEMIA poa 12/8 Hb 5.2  TRANSFUSION 2 U 12/8  HYPONATREMIA poa. 12/8/2021 Na 127   RYAN poa. 12/8/2021 Cr 2   REDI Placed 12/8 for io monitoring      _____                            GOC.12/8/2021 Full code   COVID STATUS. 12/8/2021 scv2 (-)   ICU STAY. Admitted ICU 12/8/2021   ABIO.   12/8 zosyn x 7d Pneum       PATIENT DATA   VITALS/PO/IO/VENT/DRIPS.     12/9/2021 70 100/50   12/9/2021 ac 18/400/5/.4      BEST PRACTICE ISSUES.                                                  HEAD OF BED ELEVATION. Yes  DVT PROPHYLAXIS.      12/8/2021 No pharmac pplx as pt possibly has abla on admission 12/8/2021                                    SQUIRES PROPHYLAXIS.      12/8/2021 IV protonix 40.2       12/9/2021 carafate 1.4                                                  DIET.            12/8/2021 npo till gi bleed ruled out              INFECTION PROPHYLAXIS.    12/8/2021 chlorhexidine 0.12% bid     GENERAL ISSUES                                       ALLERGY.         codeine                   WEIGHT.           12/8/2021 61                           BMI.                   12/8/2021 22  SPEECH SWALLOW RECOMMENDATIONS.   IV FLUIDS.   12/9/2021 NS 75 x 2 d      ASSESSMENT/RECOMMENDATIONS.    RESP.   Trach  SP Trach pmh     VENT MANAGEMENT.  VENT DEPENDENT RESP FAILURE pmh.  Clinically stable on current settings     COPD pmh.   12/9/2021 albuterol hf.4   12/9/2021 Spiriva   Cont Rx    HEMODYNAMICS.   HYPOTENSION poa.   12/8/2021 11a 2l ns given in er   9/8/2021 ECHO n lvsf mild dd pasp 51   12/9/2021 Midodrine 5.3   BP has improvd   target map 65 (+)     INFECTION.   Pneumonia  poa 12/8/2021   w 12/9/2021 w 8.8   12/8 urine c (-)   CXR 12/8 widespread airspace dis sl improvd cw 11/9/2021 12/8 ct cap distal airway impaction and mf pneum related to aspirat   Changes are either new or unchanged   Volume loss rll has improvd since 11/7/2021   Small l effsn   12/8 Zosyn x 7d   Is prior mrsa (-)   HO CRP (9/28)    SEVERE ANEMIA poa   Hb 12/8-12/8-12/9/2021 Hb 5.2 -9.7-8.6   12/8/2021 Hb 5.2   12/8 ct cap no rp bleed No hydronephrosis   12/8/2021 2 u prbc ordered in er   Monitor Hb serially   Target Hb 7 (+)    GERD pmh.  On ppi     sp PEG pmh    GI BLEED poa 12/8 12/9/2021 SOB (+)   12/9/2021 Seen by Dr Nieves No intervention pplannd   12/8/2021 IV protonix 40.2       12/9/2021 carafate 1.4                12/9/2021 Dr BETH nassar dvt pplx hpsc         HYPONATREMIA poa.   12/8-12/9/2021 Na 127 -131   12/9/2021 NS 75 x 2 d   Improving     RYAN poa.   12/8-12/9/2021 Cr 2-1.5   12/9/2021 NS 75 x 2 d   Improving    SEIZURES pmh   Takes lorazepam 1.3       OVERALL ASSESSMENT/PLAN  Anoxic encephalopathy vdrf peg trach patient age 76 full code  ANEMIA Monitor Hb   PNEUM Follow cult  HYPONA Monitor     TIME SPENT   Over 36 minutes aggregate critical care time spent on encounter; activities included   direct patient care, counseling and/or coordinating care reviewing notes, lab data/ imaging , discussion with multidisciplinary team/ patient  /family and explaining in detail risks, benefits, alternatives  of the recommendations     CHAPINCITO GUIDRY 75 f [facility] S 12/8/2021   DR ILIANA KEMP

## 2021-12-09 NOTE — CONSULT NOTE ADULT - SUBJECTIVE AND OBJECTIVE BOX
[facility] DIVISION of INFECTIOUS DISEASE  Arturo Olivas MD PhD, Haylee Umanzor MD, Andressa Brantley MD, Billy Valenzuela MD, Emil Medellin MD  and providing coverage with Alexa Canas MD and Eusebio Chairez MD  Providing Infectious Disease Consultations at [facility], [facility]'s    Office# [phone] to schedule follow up appointments  Answering Service for urgent calls or New Consults [phone]  Cell# to text for urgent issues Arturo Olivas [phone]     HPI:  ***Patient with dementia and is not responsive..***    75F with chronic resp failure - vent dependent, hx of subarachnoid hemorrhage, hx of cardiac arrest, dementia s/p PEG, HTN, DM2 on insulin, hx of CVA, GERD, COPD, admission here from  to 10/4 and - for respiratory failure/sepsis possibly 2/2 aspiration vs vent associated PNA who presents from [facility] for abnormal labs.  Patient does not contribute to history. In the ED, patient's initial triage vitals were BP 88/37, HR 81, RR 18, 100% on vent and T 99 F.  Labs showed leukocytosis of 12.18, HGB 5.2, BUN/Cr 89/2.00. CXR: Tracheostomy cannula reidentified in position. No change heart mediastinum. Widespread bilateral airspace disease slightly improved.  correlate for pulmonary edema and/or infection. No significant pleural effusion. No pneumothorax. Patient's hand projects over portion of the right base.       PAST MEDICAL & SURGICAL HISTORY:  Dementia of frontal lobe type  Aphasic stroke  Diabetes mellitus-type II  Respiratory failure  Hypertension  GERD (gastroesophageal reflux disease)  Constipation  Respiratory failure  CVA (cerebral vascular accident)  HTN (hypertension)  DM (diabetes mellitus)  Advanced dementia  COVID-19 virus detected  Quadriplegia  Pneumonia-recurrent asp    Hx of appendectomy  Gastrostomy in place  Tracheostomy in place  Feeding by G-tube      Antimicrobials  piperacillin/tazobactam IVPB.. 3.375 Gram(s) IV Intermittent every 8 hours  vancomycin  IVPB 750 milliGRAM(s) IV Intermittent every 12 hours      Immunological      Other  acetaminophen     Tablet .. 650 milliGRAM(s) Oral every 6 hours PRN  ALBUTerol    90 MICROgram(s) HFA Inhaler 2 Puff(s) Inhalation every 6 hours  chlorhexidine 0.12% Liquid 15 milliLiter(s) Oral Mucosa every 12 hours  dextrose 40% Gel 15 Gram(s) Oral once  dextrose 5%. 1000 milliLiter(s) IV Continuous <Continuous>  dextrose 5%. 1000 milliLiter(s) IV Continuous <Continuous>  dextrose 50% Injectable 25 Gram(s) IV Push once  dextrose 50% Injectable 12.5 Gram(s) IV Push once  dextrose 50% Injectable 25 Gram(s) IV Push once  glucagon  Injectable 1 milliGRAM(s) IntraMuscular once  insulin lispro (ADMELOG) corrective regimen sliding scale   SubCutaneous every 6 hours  LORazepam     Tablet 1 milliGRAM(s) Oral every 8 hours PRN  methocarbamol 250 milliGRAM(s) Oral two times a day  midodrine 5 milliGRAM(s) Oral every 8 hours  pantoprazole  Injectable 40 milliGRAM(s) IV Push every 12 hours  polyethylene glycol 3350 17 Gram(s) Oral every 12 hours  senna 2 Tablet(s) Oral at bedtime  simethicone 80 milliGRAM(s) Chew every 6 hours  sodium chloride 0.9%. 1000 milliLiter(s) IV Continuous <Continuous>  sucralfate suspension 1 Gram(s) Enteral Tube every 6 hours  tiotropium 18 MICROgram(s) Capsule 1 Capsule(s) Inhalation daily      Allergies    codeine (Hives)    Intolerances    SOCIAL HISTORY:  Trach, vent from [facility] (08 Dec 2021 15:50)      FAMILY HISTORY:  No pertinent family history in first degree relatives      ROS:    limited by nonverbal status    Vital Signs Last 24 Hrs  T(C): 37.2 (09 Dec 2021 08:56), Max: 38.6 (08 Dec 2021 16:45)  T(F): 99 (09 Dec 2021 08:56), Max: 101.5 (08 Dec 2021 16:45)  HR: 76 (09 Dec 2021 08:00) (74 - 84)  BP: 99/47 (09 Dec 2021 08:00) (79/43 - 103/57)  BP(mean): 62 (09 Dec 2021 08:00) (55 - 70)  RR: 20 (09 Dec 2021 08:00) (14 - 29)  SpO2: 93% (09 Dec 2021 08:00) (88% - 100%)    PE:  WDWN in no distress  HEENT:  NC, PERRL, sclerae anicteric, conjunctivae clear, EOMI.  Sinuses nontender, no nasal exudate.  No buccal or pharyngeal lesions, erythema or exudate  Neck:  Supple, no adenopathy, intubated via trach  Lungs:  No accessory muscle use, bilaterally scattered crackles  Cor:  distant  Abd:  Symmetric, normoactive BS.  Soft, nontender, no masses, guarding or rebound.  Liver and spleen not enlarged  Extrem:  No cyanosis or edema  Skin:  No rashes.  Neuro: nonverbal    Mode: AC/ CMV, RR (machine): 18, TV (machine): 400, FiO2: 40, PEEP: 5, ITime: 1, MAP: 9, PIP: 22    LABS:                        8.6    8.82  )-----------( 153      ( 09 Dec 2021 07:09 )             27.1       WBC Count: 8.82 K/uL (21 @ 07:09)  WBC Count: 6.92 K/uL (21 @ 23:10)  WBC Count: 8.30 K/uL (21 @ 20:11)  WBC Count: 12.18 K/uL (21 @ 11:58)          131<L>  |  95<L>  |  69<H>  ----------------------------<  91  3.6   |  28  |  1.52<H>    Ca    8.3<L>      09 Dec 2021 07:09  Phos  2.7         TPro  6.5  /  Alb  2.0<L>  /  TBili  1.0  /  DBili  x   /  AST  54<H>  /  ALT  49  /  AlkPhos  313<H>        Creatinine, Serum: 1.52 mg/dL (21 @ 07:09)  Creatinine, Serum: 1.65 mg/dL (21 @ 23:10)  Creatinine, Serum: 2.00 mg/dL (21 @ 11:58)      Urinalysis Basic - ( 08 Dec 2021 14:24 )    Color: Yellow / Appearance: Clear / S.010 / pH: x  Gluc: x / Ketone: Negative  / Bili: Negative / Urobili: Negative mg/dL   Blood: x / Protein: 30 mg/dL / Nitrite: Negative   Leuk Esterase: Moderate / RBC: 0-2 /HPF / WBC 3-5   Sq Epi: x / Non Sq Epi: x / Bacteria: Occasional      MICROBIOLOGY:      RADIOLOGY & ADDITIONAL STUDIES:    --< from: CT Abdomen and Pelvis No Cont (21 @ 16:52) >    ACC: [id number] EXAM:  CT ABDOMEN AND PELVIS                        ACC: [id number] EXAM:  CT CHEST                          PROCEDURE DATE:  2021          INTERPRETATION:  CLINICAL INFORMATION: Anemia. Evaluate for bleeding.    COMPARISON: CT chest abdomen pelvis 2021.    CONTRAST/COMPLICATIONS:  IV Contrast: NONE  Oral Contrast: NONE  Complications: None reported at time of study completion    PROCEDURE:  CT of the Chest, Abdomen and Pelvis was performed.  Sagittal and coronal reformats were performed.    FINDINGS:    CHEST:  LUNGS AND LARGE AIRWAYS: Tracheostomy terminates above the parmjit. There   are multifocal lung parenchymal opacities most prominent in dependent   locations, concerning for multifocal infection superimposed ondistal   airways impaction and atelectasis. Most of the opacities are either new   or unchanged since prior study. However, volume loss of the right lower   lobe has improved since 2021. Redemonstrated calcified material   again likely reflects aspirated contents.  PLEURA: Trace right and small left pleural effusions. No pneumothorax.  VESSELS: Normal caliber of the thoracic aorta with atheromatous change.   Main pulmonary artery size is within normal limits.  HEART: Heart size is qualitatively normal. Small pericardial fluid is   similar to prior. There is coronary artery calcification and mitral   calcification. Low attenuation of the blood pool of the heart may reflect   anemia.  MEDIASTINUM AND JHONNY: Prominent/mildly enlarged mediastinal and hilar   nodes are redemonstrated. The esophagus is distended with air probable   left-sided diverticulum at the thyroid level, image 15 series 4, similar   to prior.  CHEST WALL AND LOWER NECK: Symmetric appearance of the chest wall   musculature, without evidence of hematoma. Soft tissue edema is noted.    ABDOMEN AND PELVIS:    Solid organ evaluation limited due to noncontrast technique.    LIVER: Enlarged liver measuring 21 cm in craniocaudal dimension.  BILE DUCTS: No biliary distention.  GALLBLADDER: Cholelithiasis.  SPLEEN: Borderline enlarged measuring 13 cm in craniocaudal dimension.  PANCREAS: No main pancreatic ductal dilatation.  ADRENALS: Unchanged adrenal gland thickening.  KIDNEYS/URETERS: No hydronephrosis. Nonspecific bilateral perinephric   stranding.    BLADDER: Woody catheter. Urinary bladder is underdistended, suboptimally   characterized.  REPRODUCTIVE ORGANS: Uterus and adnexa suboptimally characterized on   noncontrast CT.    BOWEL: Gastrostomy tube terminates in the stomach. Oral contrast seen   within the mildly distended stomach and along small and large bowel   loops. No significant small bowel distention. Rectum is distended with   air and fluid, similar to prior.  PERITONEUM: No ascites, free air, or loculated fluid collection.  VESSELS: No aneurysm of the abdominal aorta. Aortoiliac and proximal   femoral atheromatous change.  RETROPERITONEUM/LYMPH NODES: Small volume nodes.  ABDOMINAL WALL: Symmetric appearance of the abdominal wall and   retroperitoneal musculature. Mild soft tissue edema.  BONES: Chronic fracture deformity of the partially imaged left femur,   with surrounding callus, redemonstrated. Diffuse osseous demineralization   and multilevel degenerative changes limits evaluation of the bones.   Central canal and neural foramina are not adequately assessed on this   study. Fixation hardware of the distal left radius limited in evaluation   due to positioning.    IMPRESSION:    Limited study without IV contrast.    CHEST:  1.Tracheostomy. Distal airways impaction and multifocal pneumonia,   possibly related to aspiration. Most of the opacities are either new or   unchanged since prior study. However, volume loss of the right lower lobe   has improved since 2021. Small left pleural effusion and trace right   pleural effusion, similar to prior. Follow to resolution with repeat   chest CT in 4 weeks.  2. Esophagus is distended with air probable left-sided diverticulum at   the thyroid level, image 15 series 4, similar to prior.  3. Coronary artery calcification and mitral calcification. Low   attenuation of the blood pool of the heart may reflect anemia.    ABDOMEN/PELVIS:  1. Mild chest and abdominal wall soft tissue edema. Symmetric appearance   of the abdominal wall and retroperitoneal musculature, without evidence   of hematoma. Correlate with clinical status of the patient, serial   hemoglobin and hematocrit to determine need for follow-up postcontrast   imaging.  2. No hydronephrosis of the kidneys.

## 2021-12-09 NOTE — PROGRESS NOTE ADULT - SUBJECTIVE AND OBJECTIVE BOX
Chief Complaint: Abnormal labs    Interval Events: No events overnight    Review of Systems:  Unable to obtain    Physical Exam:  Vitals:        Vital Signs Last 24 Hrs  T(C): 37.2 (09 Dec 2021 08:56), Max: 38.6 (08 Dec 2021 16:45)  T(F): 99 (09 Dec 2021 08:56), Max: 101.5 (08 Dec 2021 16:45)  HR: 76 (09 Dec 2021 08:00) (74 - 84)  BP: 99/47 (09 Dec 2021 08:00) (79/43 - 103/57)  BP(mean): 62 (09 Dec 2021 08:00) (55 - 70)  RR: 20 (09 Dec 2021 08:00) (14 - 29)  SpO2: 93% (09 Dec 2021 08:00) (88% - 100%)  General: Unresponsive  HEENT: Trach  Neck: No JVD, no carotid bruit  Lungs: CTAB  CV: RRR, nl S1/S2, no M/R/G  Abdomen: S/NT/ND, +BS  Extremities: No LE edema, no cyanosis  Neuro: AAOx0  Skin: No rash    Labs:                        8.6    8.82  )-----------( 153      ( 09 Dec 2021 07:09 )             27.1     12-09    131<L>  |  95<L>  |  69<H>  ----------------------------<  91  3.6   |  28  |  1.52<H>    Ca    8.3<L>      09 Dec 2021 07:09  Phos  2.7     12-09    TPro  6.5  /  Alb  2.0<L>  /  TBili  1.0  /  DBili  x   /  AST  54<H>  /  ALT  49  /  AlkPhos  313<H>  12-09        PT/INR - ( 09 Dec 2021 07:09 )   PT: 13.2 sec;   INR: 1.10 ratio         PTT - ( 08 Dec 2021 11:58 )  PTT:33.8 sec    Telemetry: Sinus rhythm

## 2021-12-09 NOTE — PROGRESS NOTE ADULT - ASSESSMENT
75 y/o F w/ pmh of htn, dm, CVA, dementia, recent hx of cardiac arrest at [facility], chronic resp failure s/p trach/peg presented to [facility] on 12/08/[facility]21 [facility] for abnormal labs in the ED pt was found to be anemic 2/2 to ABLA, RYAN and Hyponatermia, pt was given 2U of PRBC and h/h improved from 5.2/16.9----->9.7/30.9.      -Neuro: no acute issues  -Cardiac: Hypotension likely 2/2 to hypovolemia now s/p 2U of PRBC, given 500cc bolus of LR and 5mg of midodrine w/ improvement in SBP, add midodrine 5mg q8h, goal is to maintain MAP >65  -Resp: Chornic resp failure cont lung protective ventilation will titrate vent setting to maintain o2 >88%  -GI: NPO for now, GI ppx w/ protonix, GIB? no signs of bloody stool so far check FOBT, noncon CTAP w/ no signs of RP bleed, will trend h/h and transfusion to maintain hgb >7 GI following in put noted    -Renal: RYAN cont to monitor for now, Hyponatremia gradually improving cont to trend d/c LR and start NS at 75cc/hr for 2 days  -ID: Asp pna? on IV vanco/zosyn, f/u on blood and sputum cx, check MRSA swap, LA wnl, Wbc improving w/ no further fevers and check procal levels  -Endo: no acute issues, monitor FS q6h while NPO  -Heme: DVT ppx w/ SCD  -Dispo: pt admitted to the SPCU, full code, dispo pending hospital course 77 y/o F w/ pmh of htn, dm, CVA, dementia, recent hx of cardiac arrest at [facility], chronic resp failure s/p trach/peg presented to [facility] on 12/08/2021 from SNF for abnormal labs in the ED pt was found to be anemic 2/2 to ABLA, RYAN and Hyponatermia, pt was given 2U of PRBC and h/h improved from 5.2/16.9----->9.7/30.9.      -Neuro: no acute issues  -Cardiac: Hypotension likely 2/2 to hypovolemia now s/p 2U of PRBC, given 500cc bolus of LR and 5mg of midodrine w/ improvement in SBP, add midodrine 5mg q8h, goal is to maintain MAP >65 low threshold to start pressors  -Resp: Chornic resp failure cont lung protective ventilation will titrate vent setting to maintain o2 >88%  -GI: NPO for now, GI ppx w/ protonix, GIB? no signs of bloody stool so far check FOBT, noncon CTAP w/ no signs of RP bleed, will trend h/h and transfusion to maintain hgb >7 GI following in put noted    -Renal: RYAN cont to monitor for now, Hyponatremia gradually improving cont to trend d/c LR and start NS at 75cc/hr for 2 days  -ID: Asp pna? on IV vanco/zosyn, f/u on blood and sputum cx, check MRSA swap, LA wnl, Wbc improving w/ no further fevers and check procal levels  -Endo: no acute issues, monitor FS q6h while NPO  -Heme: DVT ppx w/ SCD  -Dispo: pt admitted to the SPCU, full code, dispo pending hospital course

## 2021-12-09 NOTE — PROGRESS NOTE ADULT - ASSESSMENT
75F with chronic resp failure - vent dependent, hx of subarachnoid hemorrhage, hx of cardiac arrest, dementia s/p PEG, HTN, DM2 on insulin, hx of CVA, GERD, COPD, admission here from 9/25 to 10/4 and 11/7-11/11 for respiratory failure/sepsis possibly 2/2 aspiration vs vent associated PNA who presents from [facility] for abnormal labs.  Patient does not contribute to history. In the ED, patient's initial triage vitals were BP 88/37, HR 81, RR 18, 100% on vent and T 99 F.  Labs showed leukocytosis of 12.18, HGB 5.2, BUN/Cr 89/2.00. CXR: Tracheostomy cannula reidentified in position. No change heart mediastinum. Widespread bilateral airspace disease slightly improved.  correlate for pulmonary edema and/or infection. No significant pleural effusion. No pneumothorax. Patient's hand projects over portion of the right base. CT chest and A/P pending  (08 Dec 2021 15:50)      ACUTE RENAL FAILURE:   Serum creatinine is  at 1.65   There is no progression . No uremic symptoms  No evidence of anemia .  Fluid status stable.  Will continue to avoid nephrotoxic drugs.  Patient remains asymptomatic.   Continue current therapy.  hold  diuretic.  hold   ACE inhibitor.  hold   ARB.  Additional evaluation:   ECG,    echocardiogram,     CXR,  will obtained recent   renal ultrasound to evalaute kidney size and possible stones ,     Admit for septic workup and ID evaluation,send blood and urine cx,serial lactate levels,monitor vitals closley,ivfs hydration,monitor urine output and renal profile,iv abx as per id cons

## 2021-12-09 NOTE — PROGRESS NOTE ADULT - SUBJECTIVE AND OBJECTIVE BOX
Date/Time Patient Seen:  		  Referring MD:   Data Reviewed	       Patient is a 76y old  Female who presents with a chief complaint of Anemia (09 Dec 2021 01:14)      Subjective/HPI     PAST MEDICAL & SURGICAL HISTORY:  Dementia of frontal lobe type    Aphasic stroke    Diabetes mellitus    Respiratory failure    Hypertension    GERD (gastroesophageal reflux disease)    Constipation    Respiratory failure    CVA (cerebral vascular accident)    HTN (hypertension)    DM (diabetes mellitus)    Advanced dementia    COVID-19 virus detected    Quadriplegia    Pneumonia    Type II diabetes mellitus    Hx of appendectomy    Gastrostomy in place    Tracheostomy in place    Tracheostomy tube present    Feeding by G-tube          Medication list         MEDICATIONS  (STANDING):  ALBUTerol    90 MICROgram(s) HFA Inhaler 2 Puff(s) Inhalation every 6 hours  chlorhexidine 0.12% Liquid 15 milliLiter(s) Oral Mucosa every 12 hours  dextrose 40% Gel 15 Gram(s) Oral once  dextrose 5%. 1000 milliLiter(s) (50 mL/Hr) IV Continuous <Continuous>  dextrose 5%. 1000 milliLiter(s) (100 mL/Hr) IV Continuous <Continuous>  dextrose 50% Injectable 25 Gram(s) IV Push once  dextrose 50% Injectable 12.5 Gram(s) IV Push once  dextrose 50% Injectable 25 Gram(s) IV Push once  glucagon  Injectable 1 milliGRAM(s) IntraMuscular once  insulin lispro (ADMELOG) corrective regimen sliding scale   SubCutaneous every 6 hours  methocarbamol 250 milliGRAM(s) Oral two times a day  midodrine 5 milliGRAM(s) Oral every 8 hours  pantoprazole  Injectable 40 milliGRAM(s) IV Push every 12 hours  piperacillin/tazobactam IVPB.. 3.375 Gram(s) IV Intermittent every 8 hours  polyethylene glycol 3350 17 Gram(s) Oral every 12 hours  senna 2 Tablet(s) Oral at bedtime  simethicone 80 milliGRAM(s) Chew every 6 hours  sodium chloride 0.9%. 1000 milliLiter(s) (75 mL/Hr) IV Continuous <Continuous>  sucralfate suspension 1 Gram(s) Enteral Tube every 6 hours  tiotropium 18 MICROgram(s) Capsule 1 Capsule(s) Inhalation daily  vancomycin  IVPB 750 milliGRAM(s) IV Intermittent every 12 hours    MEDICATIONS  (PRN):  acetaminophen     Tablet .. 650 milliGRAM(s) Oral every 6 hours PRN Temp greater or equal to 38C (100.4F), Mild Pain (1 - 3)  LORazepam     Tablet 1 milliGRAM(s) Oral every 8 hours PRN Agitation         Vitals log        ICU Vital Signs Last 24 Hrs  T(C): 36.8 (09 Dec 2021 04:00), Max: 38.6 (08 Dec 2021 16:45)  T(F): 98.2 (09 Dec 2021 04:00), Max: 101.5 (08 Dec 2021 16:45)  HR: 77 (09 Dec 2021 07:15) (74 - 84)  BP: 100/47 (09 Dec 2021 06:00) (79/43 - 103/57)  BP(mean): 62 (09 Dec 2021 06:00) (55 - 70)  ABP: --  ABP(mean): --  RR: 26 (09 Dec 2021 06:00) (14 - 29)  SpO2: 100% (09 Dec 2021 07:15) (88% - 100%)       Mode: AC/ CMV (Assist Control/ Continuous Mandatory Ventilation)  RR (machine): 18  TV (machine): 400  FiO2: 40  PEEP: 5  ITime: 1  MAP: 12  PIP: 34      Input and Output:  I&O's Detail    08 Dec 2021 07:01  -  09 Dec 2021 07:00  --------------------------------------------------------  IN:    IV PiggyBack: 450 mL    Lactated Ringers: 300 mL    Lactated Ringers Bolus: 500 mL    sodium chloride 0.9%: 375 mL  Total IN: 1625 mL    OUT:    Indwelling Catheter - Urethral (mL): 900 mL  Total OUT: 900 mL    Total NET: 725 mL          Lab Data                        8.6    8.82  )-----------( 153      ( 09 Dec 2021 07:09 )             27.1     12-08    129<L>  |  92<L>  |  80<H>  ----------------------------<  99  3.7   |  27  |  1.65<H>    Ca    8.3<L>      08 Dec 2021 23:10    TPro  6.8  /  Alb  2.1<L>  /  TBili  0.5  /  DBili  x   /  AST  39  /  ALT  27  /  AlkPhos  103  12-08            Review of Systems	      Objective     Physical Examination    heart s1s2  lung dec BS  abd soft      Pertinent Lab findings & Imaging      Annalise:  NO   Adequate UO     I&O's Detail    08 Dec 2021 07:01  -  09 Dec 2021 07:00  --------------------------------------------------------  IN:    IV PiggyBack: 450 mL    Lactated Ringers: 300 mL    Lactated Ringers Bolus: 500 mL    sodium chloride 0.9%: 375 mL  Total IN: 1625 mL    OUT:    Indwelling Catheter - Urethral (mL): 900 mL  Total OUT: 900 mL    Total NET: 725 mL               Discussed with:     Cultures:	        Radiology

## 2021-12-09 NOTE — CONSULT NOTE ADULT - SUBJECTIVE AND OBJECTIVE BOX
Patient is a 76y old  Female who presents with a chief complaint of Anemia (09 Dec 2021 09:25)      Reason For Consult: dm2 uncontrolled    HPI:  ***Patient with dementia and is not responsive.  Collateral information obtained from chart and notes.***    75F with chronic resp failure - vent dependent, hx of subarachnoid hemorrhage, hx of cardiac arrest, dementia s/p PEG, HTN, DM2 on insulin, hx of CVA, GERD, COPD, admission here from 9/25 to 10/4 and 11/7-11/11 for respiratory failure/sepsis possibly 2/2 aspiration vs vent associated PNA who presents from [facility] for abnormal labs.  Patient does not contribute to history. In the ED, patient's initial triage vitals were BP 88/37, HR 81, RR 18, 100% on vent and T 99 F.  Labs showed leukocytosis of 12.18, HGB 5.2, BUN/Cr 89/2.00. CXR: Tracheostomy cannula reidentified in position. No change heart mediastinum. Widespread bilateral airspace disease slightly improved.  correlate for pulmonary edema and/or infection. No significant pleural effusion. No pneumothorax. Patient's hand projects over portion of the right base. CT chest and A/P pending  (08 Dec 2021 15:50)      PAST MEDICAL & SURGICAL HISTORY:  Dementia of frontal lobe type    Aphasic stroke    Diabetes mellitus    Respiratory failure    Hypertension    GERD (gastroesophageal reflux disease)    Constipation    Respiratory failure    CVA (cerebral vascular accident)    HTN (hypertension)    DM (diabetes mellitus)    Advanced dementia    COVID-19 virus detected    Quadriplegia    Pneumonia    Type II diabetes mellitus    Hx of appendectomy    Gastrostomy in place    Tracheostomy in place    Tracheostomy tube present    Feeding by G-tube        FAMILY HISTORY:  No pertinent family history in first degree relatives          Social History:    MEDICATIONS  (STANDING):  ALBUTerol    90 MICROgram(s) HFA Inhaler 2 Puff(s) Inhalation every 6 hours  chlorhexidine 0.12% Liquid 15 milliLiter(s) Oral Mucosa every 12 hours  dextrose 40% Gel 15 Gram(s) Oral once  dextrose 5%. 1000 milliLiter(s) (50 mL/Hr) IV Continuous <Continuous>  dextrose 5%. 1000 milliLiter(s) (100 mL/Hr) IV Continuous <Continuous>  dextrose 50% Injectable 25 Gram(s) IV Push once  dextrose 50% Injectable 12.5 Gram(s) IV Push once  dextrose 50% Injectable 25 Gram(s) IV Push once  glucagon  Injectable 1 milliGRAM(s) IntraMuscular once  insulin lispro (ADMELOG) corrective regimen sliding scale   SubCutaneous every 6 hours  methocarbamol 250 milliGRAM(s) Oral two times a day  midodrine 5 milliGRAM(s) Oral every 8 hours  pantoprazole  Injectable 40 milliGRAM(s) IV Push every 12 hours  piperacillin/tazobactam IVPB.. 3.375 Gram(s) IV Intermittent every 8 hours  polyethylene glycol 3350 17 Gram(s) Oral every 12 hours  senna 2 Tablet(s) Oral at bedtime  simethicone 80 milliGRAM(s) Chew every 6 hours  sodium chloride 0.9%. 1000 milliLiter(s) (75 mL/Hr) IV Continuous <Continuous>  sucralfate suspension 1 Gram(s) Enteral Tube every 6 hours  tiotropium 18 MICROgram(s) Capsule 1 Capsule(s) Inhalation daily    MEDICATIONS  (PRN):  acetaminophen     Tablet .. 650 milliGRAM(s) Oral every 6 hours PRN Temp greater or equal to 38C (100.4F), Mild Pain (1 - 3)  LORazepam     Tablet 1 milliGRAM(s) Oral every 8 hours PRN Agitation        T(C): 37.2 (12-09-21 @ 08:56), Max: 38.6 (12-08-21 @ 16:45)  HR: 76 (12-09-21 @ 08:00) (74 - 84)  BP: 99/47 (12-09-21 @ 08:00) (79/43 - 103/57)  RR: 20 (12-09-21 @ 08:00) (14 - 29)  SpO2: 93% (12-09-21 @ 08:00) (88% - 100%)  Wt(kg): --    PHYSICAL EXAM:  GENERAL: NAD, well-groomed, well-developed  HEAD:  Atraumatic, Normocephalic  NECK: Supple, No JVD, Normal thyroid  CHEST/LUNG: Clear to percussion bilaterally; No rales, rhonchi, wheezing, or rubs  HEART: Regular rate and rhythm; No murmurs, rubs, or gallops  ABDOMEN: Soft, Nontender, Nondistended; Bowel sounds present  EXTREMITIES:  2+ Peripheral Pulses, No clubbing, cyanosis, or edema  SKIN: No rashes or lesions    CAPILLARY BLOOD GLUCOSE      POCT Blood Glucose.: 102 mg/dL (09 Dec 2021 06:39)  POCT Blood Glucose.: 105 mg/dL (09 Dec 2021 00:18)  POCT Blood Glucose.: 115 mg/dL (08 Dec 2021 18:32)                            8.6    8.82  )-----------( 153      ( 09 Dec 2021 07:09 )             27.1       CMP:  12-09 @ 07:09  SGPT 49  Albumin 2.0   Alk Phos 313   Anion Gap 8   SGOT 54   Total Bili 1.0   BUN 69   Calcium Total 8.3   CO2 28   Chloride 95   Creatinine 1.52   eGFR if AA 38   eGFR if non AA 32   Glucose 91   Potassium 3.6   Protein 6.5   Sodium 131      Thyroid Function Tests:      Diabetes Tests:       Radiology:

## 2021-12-09 NOTE — PROGRESS NOTE ADULT - ASSESSMENT
75F with chronic resp failure - vent dependent, hx of subarachnoid hemorrhage, hx of cardiac arrest, dementia s/p PEG, HTN, DM2 on insulin, hx of CVA, GERD, COPD, admission here from 9/25 to 10/4 and 11/7-11/11 for respiratory failure/sepsis possibly 2/2 aspiration vs vent associated PNA who presents from [facility] for abnormal labs.    # RYAN   Improving   Baseline creatinine .9  Avoid nephrotoxic meds  gentle hydration with ns   repeat BMP in AM  Strict I&O's  Nephrology following     #Anemia   HGB improving   Has hx of anemia on previous hospitalizations  Occult positive    GI consulted Dr. Nieves  S/p 2 units PRBC on admission     #Hyponatremia  Gentle hydration with ns  improving  trend bmp     # aspiration PNA  - Sepsis poa  - On empiric abx with Zosyn, vanc dc'd   - ID following, reccs appreciated   - Follow blood and urine cultures  -     #COPD:  - c/w albuterol  - pulm (Jaime), recs appreciated    #GERD:  - c/w carafate and PPI     # VTE ppx:  - SCD's for now, restart chemical DVT ppx when cleared by GI as patient is at high thrombotic risk

## 2021-12-09 NOTE — DIETITIAN INITIAL EVALUATION ADULT. - OTHER INFO
Per H&P, pt is a "75F with chronic resp failure - vent dependent, hx of subarachnoid hemorrhage, hx of cardiac arrest, dementia s/p PEG, HTN, DM2 on insulin, hx of CVA, GERD, COPD, admission here from 9/25 to 10/4 and 11/7-11/11 for respiratory failure/sepsis possibly 2/2 aspiration vs vent associated PNA who presents from [facility] for abnormal labs."  In the ED pt was found to be anemic, RYAN and Hyponatremic, pt was given 2U of PRBC and h/h improved.  Also admitted with possible PNA.     Nutrition consult enteral nutrition PTA. Pt admitted [facility]; per transfer documents, pt receiving EN feeds via PEG of Glucerna 1.5 @ 50 ml/hr x 20hr for a total volume of 1000 ml/day, providing pt w/ 1500kcal and 82.5g protein per day (+addtl 300ml before and after, +300ml at 2am= 900ml fluid/day in addition to tube feeding hourly flush).  NPO status maintained and no active tube feeding order secondary to concern of GIB (GI consulted).  Receiving IVF (NaCl@75ml/hr).  Per chart, pt admitted with stage I to BL heels and wound to L great toe and R first toe.  As medically feasible, recommend initiate PEG feedings (Glucerna 1.5, start at 20ml/hr, increase by 10ml every 6hrs until goal 50ml/hr x 20hrs is reached (to provide 1500kcal based on 28kcal/kg transfer wt, 82.5g based on 1.5g/kg bw).  Pt's adm wt 97#, per comprehensive chart review, pt admitted 1x month ago and adm wt 124#/transfer wt 120#; per transfer documents from [facility], pt currently weighs 117.2# (appears accurate compared to current adm wt), suspect pt did not have 25# wt loss x 1 month.  RD to follow-up and will continue to monitor pt's nutrition status.

## 2021-12-09 NOTE — PROGRESS NOTE ADULT - ASSESSMENT
77 yo F sent from [facility] home with abnormal labs. Patient unable to contribute to history. Had labs drawn this afternoon and reportedly has a Hgb of 6. Patient does not have a vaughn catheter. No report of fever, vomiting, bleeding. Receiving IV Zosyn through PICC line rt arm    ct imaging reviewed - poss PNA -   Midodrine added -   vs noted  HD reviewed       HCP  Pt is full code  CCM and Cardio notes reviewed  lives in SNF  vent dep -   end stage dementia  trach and peg  HOB elev  asp prec  oral hygiene  skin care  assist with all needs - ADL  work up in progress

## 2021-12-09 NOTE — PROGRESS NOTE ADULT - SUBJECTIVE AND OBJECTIVE BOX
HPI: 75 y/o F w/ pmh of htn, dm, CVA, dementia, recent hx of cardiac arrest at [facility], chronic resp failure s/p trach/peg presented to [facility] on 2021 from SNF for abnormal labs, pt was found to have RYAN, dehydration possible UTI and asp vs vent associated pna.      24 hour events:     Review of Systems: Unable to obtain pt trached    T(F): 98.5 (21 @ 00:00), Max: 101.5 (21 @ 16:45)  HR: 81 (21 @ 01:00) (74 - 84)  BP: 98/47 (21 @ 01:00) (79/43 - 98/47)  RR: 22 (21 @ 01:00) (14 - 22)  SpO2: 95% (21 @ 01:00) (92% - 100%)  Wt(kg): --    Mode: AC/ CMV (Assist Control/ Continuous Mandatory Ventilation), RR (machine): 18, TV (machine): 400, FiO2: 40, PEEP: 5    CAPILLARY BLOOD GLUCOSE      POCT Blood Glucose.: 105 mg/dL (09 Dec 2021 00:18)      I&O's Summary      Physical Exam:   Gen:  Neuro:  HEENT:  Resp:  CVS:  Abd:  Ext:  Skin:    Meds:  piperacillin/tazobactam IVPB.. IV Intermittent  vancomycin  IVPB IV Intermittent    midodrine Oral    dextrose 40% Gel Oral  dextrose 50% Injectable IV Push  dextrose 50% Injectable IV Push  dextrose 50% Injectable IV Push  glucagon  Injectable IntraMuscular  insulin lispro (ADMELOG) corrective regimen sliding scale SubCutaneous    ALBUTerol    90 MICROgram(s) HFA Inhaler Inhalation  tiotropium 18 MICROgram(s) Capsule Inhalation    acetaminophen     Tablet .. Oral PRN  LORazepam     Tablet Oral PRN  methocarbamol Oral        pantoprazole  Injectable IV Push  polyethylene glycol 3350 Oral  senna Oral  simethicone Chew  sucralfate suspension Enteral Tube      dextrose 5%. IV Continuous  dextrose 5%. IV Continuous  lactated ringers. IV Continuous      chlorhexidine 0.12% Liquid Oral Mucosa                              9.7    6.92  )-----------( 146      ( 08 Dec 2021 23:10 )             30.9       12-08    129<L>  |  92<L>  |  80<H>  ----------------------------<  99  3.7   |  27  |  1.65<H>    Ca    8.3<L>      08 Dec 2021 23:10    TPro  6.8  /  Alb  2.1<L>  /  TBili  0.5  /  DBili  x   /  AST  39  /  ALT  27  /  AlkPhos  103      Lactate 1.9            @ 11:58          PT/INR - ( 08 Dec 2021 11:58 )   PT: 13.9 sec;   INR: 1.16 ratio         PTT - ( 08 Dec 2021 11:58 )  PTT:33.8 sec  Urinalysis Basic - ( 08 Dec 2021 14:24 )    Color: Yellow / Appearance: Clear / S.010 / pH: x  Gluc: x / Ketone: Negative  / Bili: Negative / Urobili: Negative mg/dL   Blood: x / Protein: 30 mg/dL / Nitrite: Negative   Leuk Esterase: Moderate / RBC: 0-2 /HPF / WBC 3-5   Sq Epi: x / Non Sq Epi: x / Bacteria: Occasional          Rapid RVP Result: NotDetec ( @ 11:59)          Radiology: ***  Bedside ultrasound: ***    CENTRAL LINE: N/Y          DATE INSERTED:              REMOVE: Y/N  REID: N/Y                       DATE INSERTED:              REMOVE: Y/N  A-LINE: N/Y                       DATE INSERTED:              REMOVE: Y/N    GLOBAL ISSUE/BEST PRACTICE:  Analgesia:  Sedation:  CAM-ICU:   HOB elevation: yes  Stress ulcer prophylaxis:  VTE prophylaxis:  Glycemic control:  Nutrition:    CODE STATUS: ***    CRITICAL CARE TIME SPENT:  (Assessing presenting problems of acute illness, which pose high probability of life threatening deterioration or end organ damage/dysfunction, as well as medical decision making including initiating plan of care, reviewing data, reviewing radiologic exams, discussing with multidisciplinary team,  discussing goals of care with patient/family, and writing this note.  Non-inclusive of procedures performed)     HPI: 75 y/o F w/ pmh of htn, dm, CVA, dementia, recent hx of cardiac arrest at [facility], chronic resp failure s/p trach/peg presented to [facility] on 2021 from SNF for abnormal labs in the ED pt was found to be anemic, RYAN and Hyponatermia, pt was given 2U of PRBC and h/h improved from 5.2/16.9----->9.7/30.9.        24 hour events: Pt was admitted to the SPCU by day team, upon arrival to the SPCU tonight pt was noted to be hypotensive in the 80s with BP cuff reading from her contracted arms, BP cuff was adjusted to a different site w/ improvement in SBP to 90s, pt was ordered 500cc bolus of LR and stat x1 dose of 5mg midodrine.    Review of Systems: Unable to obtain pt trached    T(F): 98.5 (21 @ 00:00), Max: 101.5 (21 @ 16:45)  HR: 81 (21 @ 01:00) (74 - 84)  BP: 98/47 (21 @ 01:00) (79/43 - 98/47)  RR: 22 (21 @ 01:00) (14 - 22)  SpO2: 95% (21 @ 01:00) (92% - 100%)  Wt(kg): --    Mode: AC/ CMV (Assist Control/ Continuous Mandatory Ventilation), RR (machine): 18, TV (machine): 400, FiO2: 40, PEEP: 5    CAPILLARY BLOOD GLUCOSE      POCT Blood Glucose.: 105 mg/dL (09 Dec 2021 00:18)      I&O's Summary      Physical Exam:   Gen: comfortable in bed in NAD  Neuro: responsive to painful stimuli only  Resp: good air entry b/l  CVS: +RRR  Abd: BSx4, soft, ND  Ext: no edema   Skin: warm/dry    Meds:  piperacillin/tazobactam IVPB.. IV Intermittent  vancomycin  IVPB IV Intermittent    midodrine Oral    dextrose 40% Gel Oral  dextrose 50% Injectable IV Push  dextrose 50% Injectable IV Push  dextrose 50% Injectable IV Push  glucagon  Injectable IntraMuscular  insulin lispro (ADMELOG) corrective regimen sliding scale SubCutaneous    ALBUTerol    90 MICROgram(s) HFA Inhaler Inhalation  tiotropium 18 MICROgram(s) Capsule Inhalation    acetaminophen     Tablet .. Oral PRN  LORazepam     Tablet Oral PRN  methocarbamol Oral        pantoprazole  Injectable IV Push  polyethylene glycol 3350 Oral  senna Oral  simethicone Chew  sucralfate suspension Enteral Tube      dextrose 5%. IV Continuous  dextrose 5%. IV Continuous  lactated ringers. IV Continuous      chlorhexidine 0.12% Liquid Oral Mucosa                              9.7    6.92  )-----------( 146      ( 08 Dec 2021 23:10 )             30.9           129<L>  |  92<L>  |  80<H>  ----------------------------<  99  3.7   |  27  |  1.65<H>    Ca    8.3<L>      08 Dec 2021 23:10    TPro  6.8  /  Alb  2.1<L>  /  TBili  0.5  /  DBili  x   /  AST  39  /  ALT  27  /  AlkPhos  103      Lactate 1.9            @ 11:58          PT/INR - ( 08 Dec 2021 11:58 )   PT: 13.9 sec;   INR: 1.16 ratio         PTT - ( 08 Dec 2021 11:58 )  PTT:33.8 sec  Urinalysis Basic - ( 08 Dec 2021 14:24 )    Color: Yellow / Appearance: Clear / S.010 / pH: x  Gluc: x / Ketone: Negative  / Bili: Negative / Urobili: Negative mg/dL   Blood: x / Protein: 30 mg/dL / Nitrite: Negative   Leuk Esterase: Moderate / RBC: 0-2 /HPF / WBC 3-5   Sq Epi: x / Non Sq Epi: x / Bacteria: Occasional          Rapid RVP Result: NotDetec ( @ 11:59)          Radiology:     < from: CT Abdomen and Pelvis No Cont (21 @ 16:52) >  ACC: [id number] EXAM:  CT ABDOMEN AND PELVIS                        ACC: [id number] EXAM:  CT CHEST                          PROCEDURE DATE:  2021          INTERPRETATION:  CLINICAL INFORMATION: Anemia. Evaluate for bleeding.    COMPARISON: CT chest abdomen pelvis 2021.    CONTRAST/COMPLICATIONS:  IV Contrast: NONE  Oral Contrast: NONE  Complications: None reported at time of study completion    PROCEDURE:  CT of the Chest, Abdomen and Pelvis was performed.  Sagittal and coronal reformats were performed.    FINDINGS:    CHEST:  LUNGS AND LARGE AIRWAYS: Tracheostomy terminates above the parmjit. There   are multifocal lung parenchymal opacities most prominent in dependent   locations, concerning for multifocal infection superimposed ondistal   airways impaction and atelectasis. Most of the opacities are either new   or unchanged since prior study. However, volume loss of the right lower   lobe has improved since 2021. Redemonstrated calcified material   again likely reflects aspirated contents.  PLEURA: Trace right and small left pleural effusions. No pneumothorax.  VESSELS: Normal caliber of the thoracic aorta with atheromatous change.   Main pulmonary artery size is within normal limits.  HEART: Heart size is qualitatively normal. Small pericardial fluid is   similar to prior. There is coronary artery calcification and mitral   calcification. Low attenuation of the blood pool of the heart may reflect   anemia.  MEDIASTINUM AND JHONNY: Prominent/mildly enlarged mediastinal and hilar   nodes are redemonstrated. The esophagus is distended with air probable   left-sided diverticulum at the thyroid level, image 15 series 4, similar   to prior.  CHEST WALL AND LOWER NECK: Symmetric appearance of the chest wall   musculature, without evidence of hematoma. Soft tissue edema is noted.    ABDOMEN AND PELVIS:    Solid organ evaluation limited due to noncontrast technique.    LIVER: Enlarged liver measuring 21 cm in craniocaudal dimension.  BILE DUCTS: No biliary distention.  GALLBLADDER: Cholelithiasis.  SPLEEN: Borderline enlarged measuring 13 cm in craniocaudal dimension.  PANCREAS: No main pancreatic ductal dilatation.  ADRENALS: Unchanged adrenal gland thickening.  KIDNEYS/URETERS: No hydronephrosis. Nonspecific bilateral perinephric   stranding.    BLADDER: Reid catheter. Urinary bladder is underdistended, suboptimally   characterized.  REPRODUCTIVE ORGANS: Uterus and adnexa suboptimally characterized on   noncontrast CT.    BOWEL: Gastrostomy tube terminates in the stomach. Oral contrast seen   within the mildly distended stomach and along small and large bowel   loops. No significant small bowel distention. Rectum is distended with   air and fluid, similar to prior.  PERITONEUM: No ascites, free air, or loculated fluid collection.  VESSELS: No aneurysm of the abdominal aorta. Aortoiliac and proximal   femoral atheromatous change.  RETROPERITONEUM/LYMPH NODES: Small volume nodes.  ABDOMINAL WALL: Symmetric appearance of the abdominal wall and   retroperitoneal musculature. Mild soft tissue edema.  BONES: Chronic fracture deformity of the partially imaged left femur,   with surrounding callus, redemonstrated. Diffuse osseous demineralization   and multilevel degenerative changes limits evaluation of the bones.   Central canal and neural foramina are not adequately assessed on this   study. Fixation hardware of the distal left radius limited in evaluation   due to positioning.    IMPRESSION:    Limited study without IV contrast.    CHEST:  1.Tracheostomy. Distal airways impaction and multifocal pneumonia,   possibly related to aspiration. Most of the opacities are either new or   unchanged since prior study. However, volume loss of the right lower lobe   has improved since 2021. Small left pleural effusion and trace right   pleural effusion, similar to prior. Follow to resolution with repeat   chest CT in 4 weeks.  2. Esophagus is distended with air probable left-sided diverticulum at   the thyroid level, image 15 series 4, similar to prior.  3. Coronary artery calcification and mitral calcification. Low   attenuation of the blood pool of the heart may reflect anemia.    ABDOMEN/PELVIS:  1. Mild chest and abdominal wall soft tissue edema. Symmetric appearance   of the abdominal wall and retroperitoneal musculature, without evidence   of hematoma. Correlate with clinical status of the patient, serial   hemoglobin and hematocrit to determine need for follow-up postcontrast   imaging.  2. No hydronephrosis of the kidneys.    --- End of Report ---      CAYETANO CH M.D., ATTENDING RADIOLOGIST  This document has been electronically signed. Dec  8 2021  5:20PM    < end of copied text >      CENTRAL LINE: N  REID: N  A-LINE: N    GLOBAL ISSUE/BEST PRACTICE:  Analgesia: N  Sedation: N  CAM-ICU: n/a  HOB elevation: Y  Stress ulcer prophylaxis: Y  VTE prophylaxis: Y  Glycemic control: Y  Nutrition: N    CODE STATUS: FULL CODE    CRITICAL CARE TIME SPENT: 35 mins  (Assessing presenting problems of acute illness, which pose high probability of life threatening deterioration or end organ damage/dysfunction, as well as medical decision making including initiating plan of care, reviewing data, reviewing radiologic exams, discussing with multidisciplinary team,  discussing goals of care with patient/family, and writing this note.  Non-inclusive of procedures performed)

## 2021-12-10 ENCOUNTER — LABORATORY RESULT (OUTPATIENT)
Age: 76
End: 2021-12-10

## 2021-12-10 LAB
ALBUMIN SERPL ELPH-MCNC: 2.1 G/DL — LOW (ref 3.3–5)
ALP SERPL-CCNC: 245 U/L — HIGH (ref 30–120)
ALT FLD-CCNC: 42 U/L DA — SIGNIFICANT CHANGE UP (ref 10–60)
ANION GAP SERPL CALC-SCNC: 10 MMOL/L — SIGNIFICANT CHANGE UP (ref 5–17)
AST SERPL-CCNC: 42 U/L — HIGH (ref 10–40)
BASOPHILS # BLD AUTO: 0.04 K/UL — SIGNIFICANT CHANGE UP (ref 0–0.2)
BASOPHILS NFR BLD AUTO: 0.3 % — SIGNIFICANT CHANGE UP (ref 0–2)
BILIRUB SERPL-MCNC: 0.8 MG/DL — SIGNIFICANT CHANGE UP (ref 0.2–1.2)
BUN SERPL-MCNC: 49 MG/DL — HIGH (ref 7–23)
CALCIUM SERPL-MCNC: 8.6 MG/DL — SIGNIFICANT CHANGE UP (ref 8.4–10.5)
CHLORIDE SERPL-SCNC: 100 MMOL/L — SIGNIFICANT CHANGE UP (ref 96–108)
CO2 SERPL-SCNC: 26 MMOL/L — SIGNIFICANT CHANGE UP (ref 22–31)
CREAT SERPL-MCNC: 1.47 MG/DL — HIGH (ref 0.5–1.3)
EOSINOPHIL # BLD AUTO: 0.02 K/UL — SIGNIFICANT CHANGE UP (ref 0–0.5)
EOSINOPHIL NFR BLD AUTO: 0.2 % — SIGNIFICANT CHANGE UP (ref 0–6)
GLUCOSE BLDC GLUCOMTR-MCNC: 170 MG/DL — HIGH (ref 70–99)
GLUCOSE BLDC GLUCOMTR-MCNC: 174 MG/DL — HIGH (ref 70–99)
GLUCOSE BLDC GLUCOMTR-MCNC: 185 MG/DL — HIGH (ref 70–99)
GLUCOSE BLDC GLUCOMTR-MCNC: 223 MG/DL — HIGH (ref 70–99)
GLUCOSE SERPL-MCNC: 235 MG/DL — HIGH (ref 70–99)
HCT VFR BLD CALC: 29.9 % — LOW (ref 34.5–45)
HGB BLD-MCNC: 9.3 G/DL — LOW (ref 11.5–15.5)
IMM GRANULOCYTES NFR BLD AUTO: 0.6 % — SIGNIFICANT CHANGE UP (ref 0–1.5)
LYMPHOCYTES # BLD AUTO: 0.23 K/UL — LOW (ref 1–3.3)
LYMPHOCYTES # BLD AUTO: 1.7 % — LOW (ref 13–44)
MAGNESIUM SERPL-MCNC: 3.4 MG/DL — HIGH (ref 1.6–2.6)
MCHC RBC-ENTMCNC: 25.9 PG — LOW (ref 27–34)
MCHC RBC-ENTMCNC: 31.1 GM/DL — LOW (ref 32–36)
MCV RBC AUTO: 83.3 FL — SIGNIFICANT CHANGE UP (ref 80–100)
MONOCYTES # BLD AUTO: 0.87 K/UL — SIGNIFICANT CHANGE UP (ref 0–0.9)
MONOCYTES NFR BLD AUTO: 6.6 % — SIGNIFICANT CHANGE UP (ref 2–14)
NEUTROPHILS # BLD AUTO: 12.03 K/UL — HIGH (ref 1.8–7.4)
NEUTROPHILS NFR BLD AUTO: 90.6 % — HIGH (ref 43–77)
NRBC # BLD: 0 /100 WBCS — SIGNIFICANT CHANGE UP (ref 0–0)
PHOSPHATE SERPL-MCNC: 2.7 MG/DL — SIGNIFICANT CHANGE UP (ref 2.5–4.5)
PLATELET # BLD AUTO: 232 K/UL — SIGNIFICANT CHANGE UP (ref 150–400)
POTASSIUM SERPL-MCNC: 3.9 MMOL/L — SIGNIFICANT CHANGE UP (ref 3.5–5.3)
POTASSIUM SERPL-SCNC: 3.9 MMOL/L — SIGNIFICANT CHANGE UP (ref 3.5–5.3)
PROT SERPL-MCNC: 7 G/DL — SIGNIFICANT CHANGE UP (ref 6–8.3)
RBC # BLD: 3.59 M/UL — LOW (ref 3.8–5.2)
RBC # FLD: 17.2 % — HIGH (ref 10.3–14.5)
SODIUM SERPL-SCNC: 136 MMOL/L — SIGNIFICANT CHANGE UP (ref 135–145)
WBC # BLD: 13.27 K/UL — HIGH (ref 3.8–10.5)
WBC # FLD AUTO: 13.27 K/UL — HIGH (ref 3.8–10.5)

## 2021-12-10 PROCEDURE — 99233 SBSQ HOSP IP/OBS HIGH 50: CPT

## 2021-12-10 RX ORDER — HALOPERIDOL DECANOATE 100 MG/ML
5 INJECTION INTRAMUSCULAR ONCE
Refills: 0 | Status: COMPLETED | OUTPATIENT
Start: 2021-12-10 | End: 2021-12-10

## 2021-12-10 RX ORDER — SODIUM CHLORIDE 9 MG/ML
1000 INJECTION INTRAMUSCULAR; INTRAVENOUS; SUBCUTANEOUS
Refills: 0 | Status: DISCONTINUED | OUTPATIENT
Start: 2021-12-10 | End: 2021-12-12

## 2021-12-10 RX ORDER — HEPARIN SODIUM 5000 [USP'U]/ML
5000 INJECTION INTRAVENOUS; SUBCUTANEOUS EVERY 12 HOURS
Refills: 0 | Status: DISCONTINUED | OUTPATIENT
Start: 2021-12-10 | End: 2021-12-20

## 2021-12-10 RX ADMIN — CHLORHEXIDINE GLUCONATE 15 MILLILITER(S): 213 SOLUTION TOPICAL at 17:55

## 2021-12-10 RX ADMIN — MIDODRINE HYDROCHLORIDE 5 MILLIGRAM(S): 2.5 TABLET ORAL at 07:06

## 2021-12-10 RX ADMIN — Medication 2: at 12:40

## 2021-12-10 RX ADMIN — SIMETHICONE 80 MILLIGRAM(S): 80 TABLET, CHEWABLE ORAL at 17:57

## 2021-12-10 RX ADMIN — Medication 2: at 23:10

## 2021-12-10 RX ADMIN — Medication 2: at 18:52

## 2021-12-10 RX ADMIN — Medication 1 GRAM(S): at 07:07

## 2021-12-10 RX ADMIN — PANTOPRAZOLE SODIUM 40 MILLIGRAM(S): 20 TABLET, DELAYED RELEASE ORAL at 07:06

## 2021-12-10 RX ADMIN — MIDODRINE HYDROCHLORIDE 5 MILLIGRAM(S): 2.5 TABLET ORAL at 21:00

## 2021-12-10 RX ADMIN — PIPERACILLIN AND TAZOBACTAM 25 GRAM(S): 4; .5 INJECTION, POWDER, LYOPHILIZED, FOR SOLUTION INTRAVENOUS at 21:00

## 2021-12-10 RX ADMIN — Medication 4: at 07:05

## 2021-12-10 RX ADMIN — METHOCARBAMOL 250 MILLIGRAM(S): 500 TABLET, FILM COATED ORAL at 07:06

## 2021-12-10 RX ADMIN — SENNA PLUS 2 TABLET(S): 8.6 TABLET ORAL at 21:00

## 2021-12-10 RX ADMIN — SIMETHICONE 80 MILLIGRAM(S): 80 TABLET, CHEWABLE ORAL at 07:07

## 2021-12-10 RX ADMIN — SIMETHICONE 80 MILLIGRAM(S): 80 TABLET, CHEWABLE ORAL at 23:10

## 2021-12-10 RX ADMIN — ALBUTEROL 2 PUFF(S): 90 AEROSOL, METERED ORAL at 13:11

## 2021-12-10 RX ADMIN — METHOCARBAMOL 250 MILLIGRAM(S): 500 TABLET, FILM COATED ORAL at 17:55

## 2021-12-10 RX ADMIN — HALOPERIDOL DECANOATE 5 MILLIGRAM(S): 100 INJECTION INTRAMUSCULAR at 07:07

## 2021-12-10 RX ADMIN — ALBUTEROL 2 PUFF(S): 90 AEROSOL, METERED ORAL at 01:34

## 2021-12-10 RX ADMIN — MIDODRINE HYDROCHLORIDE 5 MILLIGRAM(S): 2.5 TABLET ORAL at 13:55

## 2021-12-10 RX ADMIN — ALBUTEROL 2 PUFF(S): 90 AEROSOL, METERED ORAL at 20:08

## 2021-12-10 RX ADMIN — Medication 1 MILLIGRAM(S): at 04:07

## 2021-12-10 RX ADMIN — Medication 1 GRAM(S): at 11:05

## 2021-12-10 RX ADMIN — Medication 1 MILLIGRAM(S): at 20:04

## 2021-12-10 RX ADMIN — Medication 1 MILLIGRAM(S): at 13:55

## 2021-12-10 RX ADMIN — ALBUTEROL 2 PUFF(S): 90 AEROSOL, METERED ORAL at 07:52

## 2021-12-10 RX ADMIN — Medication 1 GRAM(S): at 17:55

## 2021-12-10 RX ADMIN — SODIUM CHLORIDE 65 MILLILITER(S): 9 INJECTION INTRAMUSCULAR; INTRAVENOUS; SUBCUTANEOUS at 14:12

## 2021-12-10 RX ADMIN — SIMETHICONE 80 MILLIGRAM(S): 80 TABLET, CHEWABLE ORAL at 11:05

## 2021-12-10 RX ADMIN — Medication 1 GRAM(S): at 23:10

## 2021-12-10 RX ADMIN — POLYETHYLENE GLYCOL 3350 17 GRAM(S): 17 POWDER, FOR SOLUTION ORAL at 17:55

## 2021-12-10 RX ADMIN — PANTOPRAZOLE SODIUM 40 MILLIGRAM(S): 20 TABLET, DELAYED RELEASE ORAL at 17:54

## 2021-12-10 RX ADMIN — Medication 2 MILLIGRAM(S): at 07:05

## 2021-12-10 RX ADMIN — HEPARIN SODIUM 5000 UNIT(S): 5000 INJECTION INTRAVENOUS; SUBCUTANEOUS at 17:54

## 2021-12-10 RX ADMIN — CHLORHEXIDINE GLUCONATE 15 MILLILITER(S): 213 SOLUTION TOPICAL at 06:26

## 2021-12-10 RX ADMIN — PIPERACILLIN AND TAZOBACTAM 25 GRAM(S): 4; .5 INJECTION, POWDER, LYOPHILIZED, FOR SOLUTION INTRAVENOUS at 13:55

## 2021-12-10 NOTE — PROGRESS NOTE ADULT - ASSESSMENT
75F with chronic resp failure - vent dependent, hx of subarachnoid hemorrhage, hx of cardiac arrest, dementia s/p PEG, HTN, DM2 on insulin, hx of CVA, GERD, COPD, admission here from 9/25 to 10/4 and 11/7-11/11 for respiratory failure/sepsis possibly 2/2 aspiration vs vent associated PNA who presents from [facility] for abnormal labs.    # RYAN   Improving   Baseline creatinine .9  Avoid nephrotoxic meds  gentle hydration with ns   repeat BMP in AM  Strict I&O's  Nephrology following     #Anemia   HGB improving   Has hx of anemia on previous hospitalizations  Occult positive    GI consulted Dr. Nieves, no plans for scope  S/p 2 units PRBC on admission    demanding endoscopy and not realistic about expectations despite multiple conversations with myself and Dr. Nieves. Will consult Dr. Moncada for second opinion.      #Hyponatremia  Gentle hydration with ns  improving  trend bmp     # aspiration PNA  - Sepsis poa  - On empiric abx with Zosyn, vanc dc'd   - ID following, reccs appreciated   - Follow blood and urine cultures  - Restarted tube feeds, remains at high aspiration risk     #COPD:  - c/w albuterol  - pulm (Jaime), recs appreciated    #GERD:  - c/w carafate and PPI     # VTE ppx:  - Restarted heparin for DVT ppx

## 2021-12-10 NOTE — PROGRESS NOTE ADULT - SUBJECTIVE AND OBJECTIVE BOX
Patient is a 76y Female whom presented to the hospital with ckd and manasa     PAST MEDICAL & SURGICAL HISTORY:  Dementia of frontal lobe type    Aphasic stroke    Diabetes mellitus    Respiratory failure    Hypertension    GERD (gastroesophageal reflux disease)    Constipation    Respiratory failure    CVA (cerebral vascular accident)    HTN (hypertension)    DM (diabetes mellitus)    Advanced dementia    COVID-19 virus detected    Quadriplegia    Pneumonia    Type II diabetes mellitus    Hx of appendectomy    Gastrostomy in place    Tracheostomy in place    Tracheostomy tube present    Feeding by G-tube        MEDICATIONS  (STANDING):  ALBUTerol    90 MICROgram(s) HFA Inhaler 2 Puff(s) Inhalation every 6 hours  chlorhexidine 0.12% Liquid 15 milliLiter(s) Oral Mucosa every 12 hours  dextrose 40% Gel 15 Gram(s) Oral once  dextrose 5%. 1000 milliLiter(s) (50 mL/Hr) IV Continuous <Continuous>  dextrose 5%. 1000 milliLiter(s) (100 mL/Hr) IV Continuous <Continuous>  dextrose 50% Injectable 25 Gram(s) IV Push once  dextrose 50% Injectable 12.5 Gram(s) IV Push once  dextrose 50% Injectable 25 Gram(s) IV Push once  glucagon  Injectable 1 milliGRAM(s) IntraMuscular once  insulin lispro (ADMELOG) corrective regimen sliding scale   SubCutaneous every 6 hours  lactated ringers. 1000 milliLiter(s) (100 mL/Hr) IV Continuous <Continuous>  methocarbamol 250 milliGRAM(s) Oral two times a day  pantoprazole  Injectable 40 milliGRAM(s) IV Push every 12 hours  piperacillin/tazobactam IVPB.. 3.375 Gram(s) IV Intermittent every 8 hours  polyethylene glycol 3350 17 Gram(s) Oral every 12 hours  senna 2 Tablet(s) Oral at bedtime  simethicone 80 milliGRAM(s) Chew every 6 hours  sucralfate 1 Gram(s) Oral every 6 hours  tiotropium 18 MICROgram(s) Capsule 1 Capsule(s) Inhalation daily  vancomycin  IVPB 750 milliGRAM(s) IV Intermittent every 12 hours      Allergies    codeine (Hives)    Intolerances        SOCIAL HISTORY:  Denies ETOh,Smoking,     FAMILY HISTORY:  No pertinent family history in first degree relatives        REVIEW OF SYSTEMS:    unable to obtained a good review system                                                  9.3    13.27 )-----------( 232      ( 10 Dec 2021 08:38 )             29.9       CBC Full  -  ( 10 Dec 2021 08:38 )  WBC Count : 13.27 K/uL  RBC Count : 3.59 M/uL  Hemoglobin : 9.3 g/dL  Hematocrit : 29.9 %  Platelet Count - Automated : 232 K/uL  Mean Cell Volume : 83.3 fl  Mean Cell Hemoglobin : 25.9 pg  Mean Cell Hemoglobin Concentration : 31.1 gm/dL  Auto Neutrophil # : 12.03 K/uL  Auto Lymphocyte # : 0.23 K/uL  Auto Monocyte # : 0.87 K/uL  Auto Eosinophil # : 0.02 K/uL  Auto Basophil # : 0.04 K/uL  Auto Neutrophil % : 90.6 %  Auto Lymphocyte % : 1.7 %  Auto Monocyte % : 6.6 %  Auto Eosinophil % : 0.2 %  Auto Basophil % : 0.3 %      12-10    136  |  100  |  49<H>  ----------------------------<  235<H>  3.9   |  26  |  1.47<H>    Ca    8.6      10 Dec 2021 08:38  Phos  2.7     12-10  Mg     3.4     12-10    TPro  7.0  /  Alb  2.1<L>  /  TBili  0.8  /  DBili  x   /  AST  42<H>  /  ALT  42  /  AlkPhos  245<H>  12-10      CAPILLARY BLOOD GLUCOSE      POCT Blood Glucose.: 185 mg/dL (10 Dec 2021 18:50)  POCT Blood Glucose.: 174 mg/dL (10 Dec 2021 12:12)  POCT Blood Glucose.: 223 mg/dL (10 Dec 2021 06:50)  POCT Blood Glucose.: 143 mg/dL (09 Dec 2021 23:00)      Vital Signs Last 24 Hrs  T(C): 36.8 (10 Dec 2021 21:08), Max: 37 (10 Dec 2021 08:31)  T(F): 98.2 (10 Dec 2021 21:08), Max: 98.6 (10 Dec 2021 08:31)  HR: 102 (10 Dec 2021 20:05) (63 - 121)  BP: 133/61 (10 Dec 2021 20:00) (85/39 - 133/61)  BP(mean): 82 (10 Dec 2021 20:00) (54 - 82)  RR: 32 (10 Dec 2021 20:00) (15 - 35)  SpO2: 100% (10 Dec 2021 20:05) (88% - 100%)        PT/INR - ( 09 Dec 2021 07:09 )   PT: 13.2 sec;   INR: 1.10 ratio             PHYSICAL EXAM:    Constitutional: NAD  HEENT: conjunctive   clear   Neck:  No JVD  Respiratory: pos decrease bs pos trach  Cardiovascular: S1 and S2  Gastrointestinal: BS+, soft, pos peg   Extremities: No peripheral edema

## 2021-12-10 NOTE — PROGRESS NOTE ADULT - ASSESSMENT
75F with chronic resp failure - vent dependent, hx of subarachnoid hemorrhage, hx of cardiac arrest, dementia s/p PEG, HTN, DM2 on insulin, hx of CVA, GERD, COPD, admission here from 9/25 to 10/4 and 11/7-11/11 for respiratory failure/sepsis possibly 2/2 aspiration vs vent associated PNA who presents from [facility] w leukocytosis of 12.18, and concerns for repeat aspiration PNA    CT-Distal airways impaction and multifocal pneumonia, possibly related to aspiration. Most of the opacities are either new or unchanged since prior study.     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    We will follow along in the care of this patient. Please call us at [phone] or text me directly on my cell# at [phone] with any concerns.     Over the Weekend Dr Chairez will be assuming care of this patient so please contact him with any questions, concerns or new micro data.

## 2021-12-10 NOTE — PROGRESS NOTE ADULT - ASSESSMENT
75F with chronic resp failure - vent dependent, hx of subarachnoid hemorrhage, hx of cardiac arrest, dementia s/p PEG, HTN, DM2 on insulin, hx of CVA, GERD, COPD, admission here from 9/25 to 10/4 and 11/7-11/11 for respiratory failure/sepsis possibly 2/2 aspiration vs vent associated PNA who presents from [facility] for abnormal labs.  Patient does not contribute to history. In the ED, patient's initial triage vitals were BP 88/37, HR 81, RR 18, 100% on vent and T 99 F.  Labs showed leukocytosis of 12.18, HGB 5.2, BUN/Cr 89/2.00. CXR: Tracheostomy cannula reidentified in position. No change heart mediastinum. Widespread bilateral airspace disease slightly improved.  correlate for pulmonary edema and/or infection. No significant pleural effusion. No pneumothorax. Patient's hand projects over portion of the right base. CT chest and A/P pending  (08 Dec 2021 15:50)      ACUTE RENAL FAILURE:   Serum creatinine is improving    There is no progression . No uremic symptoms  No evidence of anemia .  Fluid status stable.  Will continue to avoid nephrotoxic drugs.  Patient remains asymptomatic.   Continue current therapy.  hold  diuretic.  hold   ACE inhibitor.  hold   ARB.  Additional evaluation:   ECG,    echocardiogram,     CXR,  will obtained recent   renal ultrasound to evalaute kidney size and possible stones ,     Admit for septic workup and ID evaluation,send blood and urine cx,serial lactate levels,monitor vitals closley,ivfs hydration,monitor urine output and renal profile,iv abx as per id cons

## 2021-12-10 NOTE — PROGRESS NOTE ADULT - SUBJECTIVE AND OBJECTIVE BOX
Chief Complaint: Abnormal labs    Interval Events: No events overnight    Review of Systems:  Unable to obtain    Physical Exam:  Vital Signs Last 24 Hrs  T(C): 37 (10 Dec 2021 08:31), Max: 37 (10 Dec 2021 08:31)  T(F): 98.6 (10 Dec 2021 08:31), Max: 98.6 (10 Dec 2021 08:31)  HR: 102 (10 Dec 2021 08:00) (72 - 121)  BP: 120/50 (10 Dec 2021 08:00) (99/53 - 120/50)  BP(mean): 69 (10 Dec 2021 08:00) (63 - 74)  RR: 34 (10 Dec 2021 08:00) (3 - 35)  SpO2: 97% (10 Dec 2021 08:00) (88% - 100%)  General: Unresponsive  HEENT: Trach  Neck: No JVD, no carotid bruit  Lungs: CTAB  CV: RRR, nl S1/S2, no M/R/G  Abdomen: S/NT/ND, +BS  Extremities: No LE edema, no cyanosis  Neuro: AAOx0  Skin: No rash    Labs:    12-10    136  |  100  |  49<H>  ----------------------------<  235<H>  3.9   |  26  |  1.47<H>    Ca    8.6      10 Dec 2021 08:38  Phos  2.7     12-10  Mg     3.4     12-10    TPro  7.0  /  Alb  2.1<L>  /  TBili  0.8  /  DBili  x   /  AST  42<H>  /  ALT  42  /  AlkPhos  245<H>  12-10                        9.3    13.27 )-----------( 232      ( 10 Dec 2021 08:38 )             29.9       Telemetry: Sinus rhythm

## 2021-12-10 NOTE — PROGRESS NOTE ADULT - ASSESSMENT
CHAPINCITO GUIDRY 75 f [facility] S 12/8/2021   DR ILIANA KEMP     REVIEW OF SYMPTOMS      Able to give (reliable) ROS  NO     PHYSICAL EXAM    HEENT Unremarkable  atraumatic   RESP Fair air entry EXP prolonged    Harsh breath sound Resp distres mild   CARDIAC S1 S2 No S3     NO JVD    ABDOMEN SOFT BS PRESENT NOT DISTENDED No hepatosplenomegaly   PEDAL EDEMA present No calf tenderness  NO rash       ________________  Age DOA CC.  76 year old female with a history of Pneumonia  HTN, DM, CVA, dementia, chronic respiratory failure s/p trach, PEG, cardiac arrest who presented 12/8/2021 from NH with abnormal labs. She was found to have a hemoglobin of 6. No further history could be obtained from patient.  Pulm crit care consulted 12/8/2021   ________  PROBLEM LIST.  PNEUMONIA poa 12/8  VENT MANAGEMENT. VDRF poa   COPD.  HYPOTENSION 12/8  GI BLEED poa 12/8  SEVERE ANEMIA poa 12/8 Hb 5.2  TRANSFUSION 2 U 12/8  HYPONATREMIA poa. 12/8/2021 Na 127   RYAN poa. 12/8/2021 Cr 2   REID Placed 12/8 for io monitoring  NO IV ACCESS 12/10/2021     _____                            GOC.12/8/2021 Full code   COVID STATUS. 12/8/2021 scv2 (-)   ICU STAY. Admitted ICU 12/8/2021   ABIO.   12/8 zosyn x 7d Pneum       PATIENT DATA   VITALS/PO/IO/VENT/DRIPS.    12/10/2021 afeb 120 110/50   12/10/2021 ac 18/400/5/.4        BEST PRACTICE ISSUES.                                                  HEAD OF BED ELEVATION. Yes  DVT PROPHYLAXIS.      12/8/2021 No pharmac pplx as pt possibly has abla on admission 12/8/2021                                    SQUIRES PROPHYLAXIS.      12/8/2021 IV protonix 40.2       12/9/2021 carafate 1.4                                                  DIET.            12/8/2021 npo till gi bleed ruled out              INFECTION PROPHYLAXIS.    12/8/2021 chlorhexidine 0.12% bid     GENERAL ISSUES                                       ALLERGY.         codeine                   WEIGHT.           12/8/2021 61                           BMI.                   12/8/2021 22  SPEECH SWALLOW RECOMMENDATIONS.   IV FLUIDS.   12/9/2021 NS 75 x 2 d      ASSESSMENT/RECOMMENDATIONS.    RESP.   Trach  SP Trach pmh     VENT MANAGEMENT.  VENT DEPENDENT RESP FAILURE pmh.  Clinically stable on current settings     COPD pmh.   12/9/2021 albuterol hf.4   12/9/2021 Spiriva   Cont Rx    HEMODYNAMICS.   HYPOTENSION poa.   12/8/2021 11a 2l ns given in er   9/8/2021 ECHO n lvsf mild dd pasp 51   12/9/2021 Midodrine 5.3   BP has improvd   target map 65 (+)     INFECTION.   Pneumonia  poa 12/8/2021   w 12/9/2021 w 8.8   12/9 pr 6.7   12/8 urine c (-)   CXR 12/8 widespread airspace dis sl improvd cw 11/9/2021 12/8 ct cap distal airway impaction and mf pneum related to aspirat   Changes are either new or unchanged   Volume loss rll has improvd since 11/7/2021   Small l effsn   12/8 Zosyn x 7d   Is prior mrsa (-)   HO CRP (9/28)    SEVERE ANEMIA poa   Hb 12/8-12/8-12/9/2021 Hb 5.2 -9.7-8.6 -   12/8/2021 Hb 5.2   12/8 ct cap no rp bleed No hydronephrosis   12/8/2021 2 u prbc ordered in er   Monitor Hb serially   Target Hb 7 (+)    GERD pmh.  On ppi     sp PEG pmh    GI BLEED poa 12/8 12/9/2021 SOB (+)   12/9/2021 Seen by Dr Nieves No intervention pplannd   12/8/2021 IV protonix 40.2       12/9/2021 carafate 1.4                12/9/2021 Dr BETH nassar dvt pplx hpsc       HYPONATREMIA poa.   12/8-12/9/2021 Na 127 -131   12/9/2021 NS 75 x 2 d   Improving     RYAN poa.   12/8-12/9/2021 Cr 2-1.5   12/9/2021 NS 75 x 2 d   Improving    SEIZURES pmh   Takes lorazepam 1.3       OVERALL ASSESSMENT/PLAN  Anoxic encephalopathy vdrf peg trach patient age 76 full code  ANEMIA Monitor Hb   PNEUM Follow cult  HYPONA Monitor   POOR IV ACCESS 12/10/2021 For midline       TIME SPENT   Over 36 minutes aggregate critical care time spent on encounter; activities included   direct patient care, counseling and/or coordinating care reviewing notes, lab data/ imaging , discussion with multidisciplinary team/ patient  /family and explaining in detail risks, benefits, alternatives  of the recommendations     CHAPINCITO GUIDRY 75 f [facility] S 12/8/2021   DR ILIANA KEMP

## 2021-12-10 NOTE — CONSULT NOTE ADULT - SUBJECTIVE AND OBJECTIVE BOX
Chief Complaint:  Patient is a 76y old  Female who presents with a chief complaint of Anemia (10 Dec 2021 14:30)    Dementia of frontal lobe type    Aphasic stroke    Diabetes mellitus    Respiratory failure    Hypertension    GERD (gastroesophageal reflux disease)    Constipation    Respiratory failure    CVA (cerebral vascular accident)    HTN (hypertension)    DM (diabetes mellitus)    Advanced dementia    COVID-19 virus detected    Quadriplegia    Pneumonia    Type II diabetes mellitus    Hx of appendectomy    Gastrostomy in place    Tracheostomy in place    Tracheostomy tube present    Feeding by G-tube       HPI:  ***Patient with dementia and is not responsive.  Collateral information obtained from chart and notes.***    75F with chronic resp failure - vent dependent, hx of subarachnoid hemorrhage, hx of cardiac arrest, dementia s/p PEG, HTN, DM2 on insulin, hx of CVA, GERD, COPD, admission here from 9/25 to 10/4 and 11/7-11/11 for respiratory failure/sepsis possibly 2/2 aspiration vs vent associated PNA who presents from [facility] for abnormal labs.  Patient does not contribute to history. In the ED, patient's initial triage vitals were BP 88/37, HR 81, RR 18, 100% on vent and T 99 F.  Labs showed leukocytosis of 12.18, HGB 5.2, BUN/Cr 89/2.00. CXR: Tracheostomy cannula reidentified in position. No change heart mediastinum. Widespread bilateral airspace disease slightly improved.  correlate for pulmonary edema and/or infection. No significant pleural effusion. No pneumothorax. Patient's hand projects over portion of the right base. CT chest and A/P pending  (08 Dec 2021 15:50)      codeine (Hives)      acetaminophen     Tablet .. 650 milliGRAM(s) Oral every 6 hours PRN  ALBUTerol    90 MICROgram(s) HFA Inhaler 2 Puff(s) Inhalation every 6 hours  chlorhexidine 0.12% Liquid 15 milliLiter(s) Oral Mucosa every 12 hours  dextrose 40% Gel 15 Gram(s) Oral once  dextrose 5%. 1000 milliLiter(s) IV Continuous <Continuous>  dextrose 5%. 1000 milliLiter(s) IV Continuous <Continuous>  dextrose 50% Injectable 25 Gram(s) IV Push once  dextrose 50% Injectable 12.5 Gram(s) IV Push once  dextrose 50% Injectable 25 Gram(s) IV Push once  glucagon  Injectable 1 milliGRAM(s) IntraMuscular once  heparin   Injectable 5000 Unit(s) SubCutaneous every 12 hours  insulin lispro (ADMELOG) corrective regimen sliding scale   SubCutaneous every 6 hours  LORazepam     Tablet 1 milliGRAM(s) Oral every 8 hours PRN  methocarbamol 250 milliGRAM(s) Oral two times a day  midodrine 5 milliGRAM(s) Oral every 8 hours  pantoprazole  Injectable 40 milliGRAM(s) IV Push every 12 hours  piperacillin/tazobactam IVPB.. 3.375 Gram(s) IV Intermittent every 8 hours  polyethylene glycol 3350 17 Gram(s) Oral every 12 hours  senna 2 Tablet(s) Oral at bedtime  simethicone 80 milliGRAM(s) Chew every 6 hours  sodium chloride 0.9%. 1000 milliLiter(s) IV Continuous <Continuous>  sucralfate suspension 1 Gram(s) Enteral Tube every 6 hours  tiotropium 18 MICROgram(s) Capsule 1 Capsule(s) Inhalation daily        FAMILY HISTORY:  No pertinent family history in first degree relatives          Review of Systems:    General:  No wt loss, fevers, chills, night sweats,fatigue,   Eyes:  Good vision, no reported pain  ENT:  No sore throat, pain, runny nose, dysphagia  CV:  No pain, palpitatioins, hypo/hypertension  Resp:  No dyspnea, cough, tachypnea, wheezing  :  No pain, bleeding, incontinence, nocturia  Muscle:  No pain, weakness  Neuro:  No weakness, tingling, memory problems  Psych:  No fatigue, insomnia, mood problems, depression  Endocrine:  No polyuria, polydypsia, cold/heat intolerance  Heme:  No petechiae, ecchymosis, easy bruisability  Skin:  No rash, tattoos, scars, edema    Relevant Family History:       Relevant Social History:       Physical Exam:    Vital Signs:  Vital Signs Last 24 Hrs  T(C): 36.6 (10 Dec 2021 15:58), Max: 37 (10 Dec 2021 08:31)  T(F): 97.8 (10 Dec 2021 15:58), Max: 98.6 (10 Dec 2021 08:31)  HR: 68 (10 Dec 2021 16:00) (63 - 121)  BP: 113/55 (10 Dec 2021 16:00) (85/39 - 120/50)  BP(mean): 73 (10 Dec 2021 16:00) (54 - 74)  RR: 17 (10 Dec 2021 16:00) (15 - 35)  SpO2: 100% (10 Dec 2021 16:00) (88% - 100%)  Daily     Daily     General:  Appears stated age, well-groomed, well-nourished, no distress  HEENT:  NC/AT,  conjunctivae clear and pink, no thyromegaly, nodules, adenopathy, no JVD  Chest:  Full & symmetric excursion, no increased effort, breath sounds clear  Cardiovascular:  Regular rhythm, S1, S2, no murmur/rub/S3/S4, no abdominal bruit, no edema  Abdomen:  Soft, non-tender, non-distended, normoactive bowel sounds,  no masses ,no hepatosplenomeagaly, no signs of chronic liver disease  Extremities:  no cyanosis,clubbing or edema  Skin:  No rash/erythema/ecchymoses/petechiae/wounds/abscess/warm/dry  Neuro/Psych:  Alert, oriented, no asterixis, no tremor, no encephalopathy    Laboratory:                            9.3    13.27 )-----------( 232      ( 10 Dec 2021 08:38 )             29.9     12-10    136  |  100  |  49<H>  ----------------------------<  235<H>  3.9   |  26  |  1.47<H>    Ca    8.6      10 Dec 2021 08:38  Phos  2.7     12-10  Mg     3.4     12-10    TPro  7.0  /  Alb  2.1<L>  /  TBili  0.8  /  DBili  x   /  AST  42<H>  /  ALT  42  /  AlkPhos  245<H>  12-10    LIVER FUNCTIONS - ( 10 Dec 2021 08:38 )  Alb: 2.1 g/dL / Pro: 7.0 g/dL / ALK PHOS: 245 U/L / ALT: 42 U/L DA / AST: 42 U/L / GGT: x           PT/INR - ( 09 Dec 2021 07:09 )   PT: 13.2 sec;   INR: 1.10 ratio               Imaging:

## 2021-12-10 NOTE — PROGRESS NOTE ADULT - SUBJECTIVE AND OBJECTIVE BOX
Patient is a 76y old  Female who presents with a chief complaint of Anemia (10 Dec 2021 09:13)      INTERVAL HPI/OVERNIGHT EVENTS: Patient seen and examined at bedside. No overnight events. HGB stable       MEDICATIONS  (STANDING):  ALBUTerol    90 MICROgram(s) HFA Inhaler 2 Puff(s) Inhalation every 6 hours  chlorhexidine 0.12% Liquid 15 milliLiter(s) Oral Mucosa every 12 hours  dextrose 40% Gel 15 Gram(s) Oral once  dextrose 5%. 1000 milliLiter(s) (50 mL/Hr) IV Continuous <Continuous>  dextrose 5%. 1000 milliLiter(s) (100 mL/Hr) IV Continuous <Continuous>  dextrose 50% Injectable 25 Gram(s) IV Push once  dextrose 50% Injectable 12.5 Gram(s) IV Push once  dextrose 50% Injectable 25 Gram(s) IV Push once  glucagon  Injectable 1 milliGRAM(s) IntraMuscular once  heparin   Injectable 5000 Unit(s) SubCutaneous every 12 hours  insulin lispro (ADMELOG) corrective regimen sliding scale   SubCutaneous every 6 hours  methocarbamol 250 milliGRAM(s) Oral two times a day  midodrine 5 milliGRAM(s) Oral every 8 hours  pantoprazole  Injectable 40 milliGRAM(s) IV Push every 12 hours  piperacillin/tazobactam IVPB.. 3.375 Gram(s) IV Intermittent every 8 hours  polyethylene glycol 3350 17 Gram(s) Oral every 12 hours  senna 2 Tablet(s) Oral at bedtime  simethicone 80 milliGRAM(s) Chew every 6 hours  sodium chloride 0.9%. 1000 milliLiter(s) (75 mL/Hr) IV Continuous <Continuous>  sucralfate suspension 1 Gram(s) Enteral Tube every 6 hours  tiotropium 18 MICROgram(s) Capsule 1 Capsule(s) Inhalation daily    MEDICATIONS  (PRN):  acetaminophen     Tablet .. 650 milliGRAM(s) Oral every 6 hours PRN Temp greater or equal to 38C (100.4F), Mild Pain (1 - 3)  LORazepam     Tablet 1 milliGRAM(s) Oral every 8 hours PRN Agitation      Allergies    codeine (Hives)    Intolerances        REVIEW OF SYSTEMS:  Unable to obtain meaningful ROS due to mental status      Vital Signs Last 24 Hrs  T(C): 37 (10 Dec 2021 12:49), Max: 37 (10 Dec 2021 08:31)  T(F): 98.6 (10 Dec 2021 12:49), Max: 98.6 (10 Dec 2021 08:31)  HR: 63 (10 Dec 2021 13:11) (63 - 121)  BP: 85/39 (10 Dec 2021 12:09) (85/39 - 120/50)  BP(mean): 54 (10 Dec 2021 12:09) (54 - 74)  RR: 15 (10 Dec 2021 12:09) (3 - 35)  SpO2: 96% (10 Dec 2021 13:11) (88% - 100%)    PHYSICAL EXAM:  GENERAL: chronically ill appearing, emaciated, NAD, obtunded  HEAD:  atraumatic  EYES: conjunctiva clear  NECK: +trach in place, clean  RESPIRATORY:  coarse mechanical breath sounds, no gross crackles or rales  CARDIOVASCULAR:  regular rate and rhythm, no murmurs or rubs or gallops, 2+ peripheral pulses  GASTROINTESTINAL:  soft, +PEG in place, clean and dry as well, nondistended, no hepatosplenomegaly palpated, bowel sounds present  EXTREMITIES: no clubbing or cyanosis or edema  MUSCULOSKELETAL:  +diffuse contractures in all joints  NERVOUS SYSTEM: does not respond to pain  SKIN: necrotic tips of bilateral 1st toes      LABS:                        9.3    13.27 )-----------( 232      ( 10 Dec 2021 08:38 )             29.9     CBC Full  -  ( 10 Dec 2021 08:38 )  WBC Count : 13.27 K/uL  Hemoglobin : 9.3 g/dL  Hematocrit : 29.9 %  Platelet Count - Automated : 232 K/uL  Mean Cell Volume : 83.3 fl  Mean Cell Hemoglobin : 25.9 pg  Mean Cell Hemoglobin Concentration : 31.1 gm/dL  Auto Neutrophil # : 12.03 K/uL  Auto Lymphocyte # : 0.23 K/uL  Auto Monocyte # : 0.87 K/uL  Auto Eosinophil # : 0.02 K/uL  Auto Basophil # : 0.04 K/uL  Auto Neutrophil % : 90.6 %  Auto Lymphocyte % : 1.7 %  Auto Monocyte % : 6.6 %  Auto Eosinophil % : 0.2 %  Auto Basophil % : 0.3 %    10 Dec 2021 08:38    136    |  100    |  49     ----------------------------<  235    3.9     |  26     |  1.47     Ca    8.6        10 Dec 2021 08:38  Phos  2.7       10 Dec 2021 08:38  Mg     3.4       10 Dec 2021 08:38    TPro  7.0    /  Alb  2.1    /  TBili  0.8    /  DBili  x      /  AST  42     /  ALT  42     /  AlkPhos  245    10 Dec 2021 08:38    PT/INR - ( 09 Dec 2021 07:09 )   PT: 13.2 sec;   INR: 1.10 ratio           Urinalysis Basic - ( 08 Dec 2021 14:24 )    Color: Yellow / Appearance: Clear / S.010 / pH: x  Gluc: x / Ketone: Negative  / Bili: Negative / Urobili: Negative mg/dL   Blood: x / Protein: 30 mg/dL / Nitrite: Negative   Leuk Esterase: Moderate / RBC: 0-2 /HPF / WBC 3-5   Sq Epi: x / Non Sq Epi: x / Bacteria: Occasional      CAPILLARY BLOOD GLUCOSE      POCT Blood Glucose.: 174 mg/dL (10 Dec 2021 12:12)  POCT Blood Glucose.: 223 mg/dL (10 Dec 2021 06:50)  POCT Blood Glucose.: 143 mg/dL (09 Dec 2021 23:00)  POCT Blood Glucose.: 112 mg/dL (09 Dec 2021 18:27)        Culture - Urine (collected 21 @ 17:55)  Source: Clean Catch Clean Catch (Midstream)  Final Report (21 @ 13:58):    <10,000 CFU/mL Normal Urogenital Elvira    Culture - Blood (collected 21 @ 15:24)  Source: .Blood Blood-Peripheral  Preliminary Report (21 @ 16:01):    No growth to date.    Culture - Blood (collected 21 @ 15:24)  Source: .Blood Blood-Peripheral  Preliminary Report (21 @ 16:01):    No growth to date.        RADIOLOGY & ADDITIONAL TESTS:     Consultant(s) Notes Reviewed:  [x] YES  [ ] NO    Care Discussed with [x] Consultants  [x] Patient  [ ] Family  [ ]      [ x] Other; RN  DVT ppx

## 2021-12-10 NOTE — PROGRESS NOTE ADULT - ASSESSMENT
75F with chronic resp failure - vent dependent, hx of subarachnoid hemorrhage, hx of cardiac arrest, dementia s/p PEG, HTN   a/w acute renal failure   eval re anemia, guaiac + w/o gross gi bleeding   transfuse to hgb >7.5, monitor stools re signs of gi blood loss    lavage peg to evaluate gastric contents--NO BLEEDING    IV PPI BID  NO plan for EGD as risks greatly outweigh benefits   hold ac/antiplatelet meds- NOT a good candidate for antiplatelet meds/AC (DVT Px dose acceptable)   feed via peg   d/w

## 2021-12-10 NOTE — PROGRESS NOTE ADULT - SUBJECTIVE AND OBJECTIVE BOX
Neurology follow up note    BREA [id number]      Interval History:    Patient resting in bed     Allergies    codeine (Hives)    Intolerances        MEDICATIONS    acetaminophen     Tablet .. 650 milliGRAM(s) Oral every 6 hours PRN  ALBUTerol    90 MICROgram(s) HFA Inhaler 2 Puff(s) Inhalation every 6 hours  chlorhexidine 0.12% Liquid 15 milliLiter(s) Oral Mucosa every 12 hours  dextrose 40% Gel 15 Gram(s) Oral once  dextrose 5%. 1000 milliLiter(s) IV Continuous <Continuous>  dextrose 5%. 1000 milliLiter(s) IV Continuous <Continuous>  dextrose 50% Injectable 25 Gram(s) IV Push once  dextrose 50% Injectable 12.5 Gram(s) IV Push once  dextrose 50% Injectable 25 Gram(s) IV Push once  glucagon  Injectable 1 milliGRAM(s) IntraMuscular once  heparin   Injectable 5000 Unit(s) SubCutaneous every 12 hours  insulin lispro (ADMELOG) corrective regimen sliding scale   SubCutaneous every 6 hours  LORazepam     Tablet 1 milliGRAM(s) Oral every 8 hours PRN  methocarbamol 250 milliGRAM(s) Oral two times a day  midodrine 5 milliGRAM(s) Oral every 8 hours  pantoprazole  Injectable 40 milliGRAM(s) IV Push every 12 hours  piperacillin/tazobactam IVPB.. 3.375 Gram(s) IV Intermittent every 8 hours  polyethylene glycol 3350 17 Gram(s) Oral every 12 hours  senna 2 Tablet(s) Oral at bedtime  simethicone 80 milliGRAM(s) Chew every 6 hours  sodium chloride 0.9%. 1000 milliLiter(s) IV Continuous <Continuous>  sucralfate suspension 1 Gram(s) Enteral Tube every 6 hours  tiotropium 18 MICROgram(s) Capsule 1 Capsule(s) Inhalation daily              Vital Signs Last 24 Hrs  T(C): 37 (10 Dec 2021 12:49), Max: 37 (10 Dec 2021 08:31)  T(F): 98.6 (10 Dec 2021 12:49), Max: 98.6 (10 Dec 2021 08:31)  HR: 66 (10 Dec 2021 14:00) (63 - 121)  BP: 103/56 (10 Dec 2021 14:00) (85/39 - 120/50)  BP(mean): 71 (10 Dec 2021 14:00) (54 - 74)  RR: 21 (10 Dec 2021 14:00) (3 - 35)  SpO2: 100% (10 Dec 2021 14:00) (88% - 100%)      REVIEW OF SYSTEMS:  Could not be obtained secondary to the patient being nonverbal.    PHYSICAL EXAMINATION:    HEENT:  Head:  Normocephalic.  Eyes:  No scleral icterus.  Ears:  Hard to evaluate secondary to the patient being nonverbal.  NECK:  Had increased tone, tracheostomy was in place.  RESPIRATORY:  Decreased breath sounds bilaterally.  CARDIOVASCULAR:  S1 and S2 heard.  ABDOMEN:  Soft and nontender.  EXTREMITIES:  No clubbing or cyanosis was noted.      NEUROLOGIC:  The patient was resting in bed with eyes open.  Upon stimulation of the patient, her eyes would roll up, with subtle shaking.  Speech:  Nonverbal.  Tone in all four extremities increased.  Bilateral upper extremities were in a flexed position.  Bilateral lower extremities were in the straight position.              LABS:  CBC Full  -  ( 10 Dec 2021 08:38 )  WBC Count : 13.27 K/uL  RBC Count : 3.59 M/uL  Hemoglobin : 9.3 g/dL  Hematocrit : 29.9 %  Platelet Count - Automated : 232 K/uL  Mean Cell Volume : 83.3 fl  Mean Cell Hemoglobin : 25.9 pg  Mean Cell Hemoglobin Concentration : 31.1 gm/dL  Auto Neutrophil # : 12.03 K/uL  Auto Lymphocyte # : 0.23 K/uL  Auto Monocyte # : 0.87 K/uL  Auto Eosinophil # : 0.02 K/uL  Auto Basophil # : 0.04 K/uL  Auto Neutrophil % : 90.6 %  Auto Lymphocyte % : 1.7 %  Auto Monocyte % : 6.6 %  Auto Eosinophil % : 0.2 %  Auto Basophil % : 0.3 %      12-10    136  |  100  |  49<H>  ----------------------------<  235<H>  3.9   |  26  |  1.47<H>    Ca    8.6      10 Dec 2021 08:38  Phos  2.7     12-10  Mg     3.4     12-10    TPro  7.0  /  Alb  2.1<L>  /  TBili  0.8  /  DBili  x   /  AST  42<H>  /  ALT  42  /  AlkPhos  245<H>  12-10    Hemoglobin A1C:     LIVER FUNCTIONS - ( 10 Dec 2021 08:38 )  Alb: 2.1 g/dL / Pro: 7.0 g/dL / ALK PHOS: 245 U/L / ALT: 42 U/L DA / AST: 42 U/L / GGT: x           Vitamin B12   PT/INR - ( 09 Dec 2021 07:09 )   PT: 13.2 sec;   INR: 1.10 ratio               RADIOLOGY    ANALYSIS AND PLAN:  This is a 76-year-old with episodes of altered mental status and a history of shaking.  For altered mental status, metabolic encephalopathy secondary to underlying infectious type process and possibly underlying pneumonia.  For episodes of abnormal movements, as per my conversation with the spouse in the past, these were nonepileptic in nature.  As per my conversation with him in the past, does not want any antiseizure medications.  The patient was tried before in the past on muscle relaxants to see if that will help with tone, with no significant changes.  For history of diabetes, strict control of blood sugars.  events noted     The spouse's name is Marciano, his telephone number is [phone] 12/10    Greater than 40 minutes of time was spent with the patient, plan of care, reviewing data, and speaking to the multidisciplinary healthcare team with greater than 50% of that time in counseling and care coordination.    Thank you for the courtesy of this consultation.

## 2021-12-10 NOTE — PROGRESS NOTE ADULT - ASSESSMENT
75 yo F sent from [facility] home with abnormal labs. Patient unable to contribute to history. Had labs drawn this afternoon and reportedly has a Hgb of 6. Patient does not have a vaughn catheter. No report of fever, vomiting, bleeding. Receiving IV Zosyn through PICC line rt arm    curr NPO  GI follow up - serial Hgb  ct imaging reviewed - poss PNA -   Midodrine added -   vs noted  HD reviewed       HCP  Pt is full code  CCM and Cardio notes reviewed  lives in SNF  vent dep -   end stage dementia  trach and peg  HOB elev  asp prec  oral hygiene  skin care  assist with all needs - ADL  work up in progress

## 2021-12-10 NOTE — PROGRESS NOTE ADULT - ASSESSMENT
Patient is a 77 y/o F w/ pmh of htn, dm, CVA, dementia, recent hx of cardiac arrest at [facility], chronic resp failure s/p trach/peg presented to [facility] on 12/08/2021 from SNF for abnormal labs, found to have:    1. Chronic resp failure   2. ABLA  3. ARF, hyponatremia     Plan  neuro: no active issues  Cardio: HD stable, cont midodrine, keep MAP above 65  Pulm: Remains vented, on 30% peep of 5. Once secretions clear can trial on SBT  GI: Restart feeds pending cbc in morning, if stable. No plans for EGD per GI at this time. tonight hbg is stable , cont PPI BID   Renal: hyponatremia and ARF improving with fluids. Strict i/Os, renally dose meds  ID: on zosyn empirically all cxs negative to date. cont for short course   endo: ISS q6  Heme: SCDs only, serial cbc, transfuse if hbg is less than 7, cont Carafate

## 2021-12-10 NOTE — PROGRESS NOTE ADULT - SUBJECTIVE AND OBJECTIVE BOX
[facility] DIVISION of INFECTIOUS DISEASE  Arturo Olivas MD PhD, Haylee Umanzor MD, Andressa Brantley MD, Billy Valenzuela MD, Emil Medellin MD  and providing coverage with Alexa Canas MD and Eusebio Chairez MD  Providing Infectious Disease Consultations at [facility], [facility]    Office# [phone] to schedule follow up appointments  Answering Service for urgent calls or New Consults [phone]  Cell# to text for urgent issues Arturo Olivas [phone]     infectious diseases progress note:    BREA BECKHAM is a 76y y. o. Female patient    Patient remains nonverbal on the vent    Allergies    codeine (Hives)    Intolerances        ANTIBIOTICS/RELEVANT:  antimicrobials  piperacillin/tazobactam IVPB.. 3.375 Gram(s) IV Intermittent every 8 hours    immunologic:    OTHER:  acetaminophen     Tablet .. 650 milliGRAM(s) Oral every 6 hours PRN  ALBUTerol    90 MICROgram(s) HFA Inhaler 2 Puff(s) Inhalation every 6 hours  chlorhexidine 0.12% Liquid 15 milliLiter(s) Oral Mucosa every 12 hours  dextrose 40% Gel 15 Gram(s) Oral once  dextrose 5%. 1000 milliLiter(s) IV Continuous <Continuous>  dextrose 5%. 1000 milliLiter(s) IV Continuous <Continuous>  dextrose 50% Injectable 25 Gram(s) IV Push once  dextrose 50% Injectable 12.5 Gram(s) IV Push once  dextrose 50% Injectable 25 Gram(s) IV Push once  glucagon  Injectable 1 milliGRAM(s) IntraMuscular once  insulin lispro (ADMELOG) corrective regimen sliding scale   SubCutaneous every 6 hours  LORazepam     Tablet 1 milliGRAM(s) Oral every 8 hours PRN  methocarbamol 250 milliGRAM(s) Oral two times a day  midodrine 5 milliGRAM(s) Oral every 8 hours  pantoprazole  Injectable 40 milliGRAM(s) IV Push every 12 hours  polyethylene glycol 3350 17 Gram(s) Oral every 12 hours  senna 2 Tablet(s) Oral at bedtime  simethicone 80 milliGRAM(s) Chew every 6 hours  sodium chloride 0.9%. 1000 milliLiter(s) IV Continuous <Continuous>  sucralfate suspension 1 Gram(s) Enteral Tube every 6 hours  tiotropium 18 MICROgram(s) Capsule 1 Capsule(s) Inhalation daily      Objective:  Last 24-Vital Signs Last 24 Hrs  T(C): 37 (10 Dec 2021 08:31), Max: 37.2 (09 Dec 2021 08:56)  T(F): 98.6 (10 Dec 2021 08:31), Max: 99 (09 Dec 2021 08:56)  HR: 110 (10 Dec 2021 07:58) (72 - 121)  BP: 116/51 (10 Dec 2021 02:00) (99/53 - 116/51)  BP(mean): 70 (10 Dec 2021 02:00) (63 - 74)  RR: 35 (10 Dec 2021 02:00) (3 - 35)  SpO2: 100% (10 Dec 2021 07:58) (88% - 100%)    T(C): 37 (12-10-21 @ 08:31), Max: 38.6 (21 @ 16:45)  T(F): 98.6 (12-10-21 @ 08:31), Max: 101.5 (21 @ 16:45)  T(C): 37 (12-10-21 @ 08:31), Max: 38.6 (21 @ 16:45)  T(F): 98.6 (12-10-21 @ 08:31), Max: 101.5 (21 @ 16:45)  T(C): 37 (12-10-21 @ 08:31), Max: 38.6 (21 @ 16:45)  T(F): 98.6 (12-10-21 @ 08:31), Max: 101.5 (21 @ 16:45)    PHYSICAL EXAM:  Constitutional: Well-developed, well nourished  Eyes: PERRLA, EOMI  Ear/Nose/Throat: oropharynx normal	  Neck: no JVD, no lymphadenopathy, supple, intubated via trach  Respiratory: no accessory muscle use, lung fields coarse BS, crackles  Cardiovascular: distant  Gastrointestinal: soft, NT, no HSM, BS-normal  Extremities: no clubbing, no cyanosis  Neuro: patient nonverbal      Mode: AC/ CMV 18, TV (machine): 400, FiO2: 40, PEEP: 5, PS: , ITime: 0.1, MAP: 10, PIP: 22    LABS:                        9.3    13.27 )-----------( 232      ( 10 Dec 2021 08:38 )             29.9       WBC 13.27  12-10 @ 08:38  WBC 9.43   @ 20:48  WBC 8.82   @ 07:09  WBC 6.92   @ 23:10  WBC 8.30   @ 20:11  WBC 12.18   @ 11:58          131<L>  |  95<L>  |  69<H>  ----------------------------<  91  3.6   |  28  |  1.52<H>    Ca    8.3<L>      09 Dec 2021 07:09  Phos  2.7         TPro  6.5  /  Alb  2.0<L>  /  TBili  1.0  /  DBili  x   /  AST  54<H>  /  ALT  49  /  AlkPhos  313<H>        Creatinine, Serum: 1.52 mg/dL (21 @ 07:09)  Creatinine, Serum: 1.65 mg/dL (21 @ 23:10)  Creatinine, Serum: 2.00 mg/dL (21 @ 11:58)      PT/INR - ( 09 Dec 2021 07:09 )   PT: 13.2 sec;   INR: 1.10 ratio         PTT - ( 08 Dec 2021 11:58 )  PTT:33.8 sec  Urinalysis Basic - ( 08 Dec 2021 14:24 )    Color: Yellow / Appearance: Clear / S.010 / pH: x  Gluc: x / Ketone: Negative  / Bili: Negative / Urobili: Negative mg/dL   Blood: x / Protein: 30 mg/dL / Nitrite: Negative   Leuk Esterase: Moderate / RBC: 0-2 /HPF / WBC 3-5   Sq Epi: x / Non Sq Epi: x / Bacteria: Occasional            MICROBIOLOGY:              RADIOLOGY & ADDITIONAL STUDIES:

## 2021-12-10 NOTE — PROGRESS NOTE ADULT - SUBJECTIVE AND OBJECTIVE BOX
Chief Complaint:        INTERVAL HPI/OVERNIGHT EVENTS:  no significant events overnight reported   no gi bleeding      MEDICATIONS  (STANDING):  ALBUTerol    90 MICROgram(s) HFA Inhaler 2 Puff(s) Inhalation every 6 hours  chlorhexidine 0.12% Liquid 15 milliLiter(s) Oral Mucosa every 12 hours  dextrose 40% Gel 15 Gram(s) Oral once  dextrose 5%. 1000 milliLiter(s) (50 mL/Hr) IV Continuous <Continuous>  dextrose 5%. 1000 milliLiter(s) (100 mL/Hr) IV Continuous <Continuous>  dextrose 50% Injectable 25 Gram(s) IV Push once  dextrose 50% Injectable 12.5 Gram(s) IV Push once  dextrose 50% Injectable 25 Gram(s) IV Push once  glucagon  Injectable 1 milliGRAM(s) IntraMuscular once  insulin lispro (ADMELOG) corrective regimen sliding scale   SubCutaneous every 6 hours  methocarbamol 250 milliGRAM(s) Oral two times a day  midodrine 5 milliGRAM(s) Oral every 8 hours  pantoprazole  Injectable 40 milliGRAM(s) IV Push every 12 hours  piperacillin/tazobactam IVPB.. 3.375 Gram(s) IV Intermittent every 8 hours  polyethylene glycol 3350 17 Gram(s) Oral every 12 hours  senna 2 Tablet(s) Oral at bedtime  simethicone 80 milliGRAM(s) Chew every 6 hours  sodium chloride 0.9%. 1000 milliLiter(s) (75 mL/Hr) IV Continuous <Continuous>  sucralfate suspension 1 Gram(s) Enteral Tube every 6 hours  tiotropium 18 MICROgram(s) Capsule 1 Capsule(s) Inhalation daily    MEDICATIONS  (PRN):  acetaminophen     Tablet .. 650 milliGRAM(s) Oral every 6 hours PRN Temp greater or equal to 38C (100.4F), Mild Pain (1 - 3)  LORazepam     Tablet 1 milliGRAM(s) Oral every 8 hours PRN Agitation            Vital Signs Last 24 Hrs  T(C): 37 (10 Dec 2021 08:31), Max: 37 (10 Dec 2021 08:31)  T(F): 98.6 (10 Dec 2021 08:31), Max: 98.6 (10 Dec 2021 08:31)  HR: 110 (10 Dec 2021 07:58) (72 - 121)  BP: 116/51 (10 Dec 2021 02:00) (99/53 - 116/51)  BP(mean): 70 (10 Dec 2021 02:00) (63 - 74)  RR: 35 (10 Dec 2021 02:00) (3 - 35)  SpO2: 100% (10 Dec 2021 07:58) (88% - 100%)    Physical exam:  abd soft, peg c/'d/i  cv s1s2  chest air entry          LABS:                        9.3    13.27 )-----------( 232      ( 10 Dec 2021 08:38 )             29.9     12-10    136  |  100  |  49<H>  ----------------------------<  235<H>  3.9   |  26  |  1.47<H>    Ca    8.6      10 Dec 2021 08:38  Phos  2.7     12-10  Mg     3.4     12-10    TPro  7.0  /  Alb  2.1<L>  /  TBili  0.8  /  DBili  x   /  AST  42<H>  /  ALT  42  /  AlkPhos  245<H>  12-10    PT/INR - ( 09 Dec 2021 07:09 )   PT: 13.2 sec;   INR: 1.10 ratio         PTT - ( 08 Dec 2021 11:58 )  PTT:33.8 sec      RADIOLOGY & ADDITIONAL TESTS:

## 2021-12-10 NOTE — PROGRESS NOTE ADULT - SUBJECTIVE AND OBJECTIVE BOX
BREA BECKHAM    [facility]    Allergies    codeine (Hives)    Intolerances        PAST MEDICAL & SURGICAL HISTORY:  Dementia of frontal lobe type    Aphasic stroke    Diabetes mellitus    Respiratory failure    Hypertension    GERD (gastroesophageal reflux disease)    Constipation    Respiratory failure    CVA (cerebral vascular accident)    HTN (hypertension)    DM (diabetes mellitus)    Advanced dementia    COVID-19 virus detected    Quadriplegia    Pneumonia    Type II diabetes mellitus    Hx of appendectomy    Gastrostomy in place    Tracheostomy in place    Tracheostomy tube present    Feeding by G-tube        FAMILY HISTORY:  No pertinent family history in first degree relatives        Home Medications:  albuterol 90 mcg/inh inhalation aerosol: 2 puff(s) inhaled every 6 hours (08 Dec 2021 16:51)  Bacid (LAC) oral tablet: 2 tab(s) by gastrostomy tube once a day (08 Dec 2021 16:51)  Carafate 1 g/10 mL oral suspension: 10 milliliter(s) by gastrostomy tube 4 times a day (before meals and at bedtime) for 14 days (Started 21) (08 Dec 2021 16:51)  chlorhexidine 0.12% mucous membrane liquid: 15 milliliter(s) mucous membrane 2 times a day (08 Dec 2021 16:51)  insulin lispro 100 units/mL injectable solution:  injectable; sliding scale coverage  (08 Dec 2021 16:51)  ipratropium-albuterol 0.5 mg-2.5 mg/3 mL inhalation solution: 3 milliliter(s) inhaled 4 times a day (08 Dec 2021 16:51)  Lantus 100 units/mL subcutaneous solution: 36 unit(s) subcutaneous once a day (at bedtime) (08 Dec 2021 16:51)  LORazepam 1 mg oral tablet: 1 tab(s) by gastrostomy tube 3 times a day, As Needed (08 Dec 2021 16:51)  methocarbamol: 250 milligram(s) by gastrostomy tube 2 times a day (08 Dec 2021 16:51)  pantoprazole 40 mg oral granule, delayed release: 40 milligram(s) by gastrostomy tube 2 times a day (08 Dec 2021 16:51)  polyethylene glycol 3350 oral powder for reconstitution: 17 gram(s) orally every 12 hours (08 Dec 2021 16:51)  ProAir HFA 90 mcg/inh inhalation aerosol: 2 puff(s) inhaled every 6 hours (08 Dec 2021 16:51)  senna 8.6 mg oral tablet: 3 tab(s) by gastrostomy tube once a day (at bedtime) (08 Dec 2021 16:51)  simethicone 80 mg oral tablet, chewable: 1 tab(s) orally every 6 hours (08 Dec 2021 16:51)  Tylenol 325 mg oral tablet: 2 tab(s) by gastrostomy tube once a day; 60 minutes prior to dressing change  (08 Dec 2021 16:51)  Zosyn 3 g-0.375 g/50 mL intravenous solution: intravenous every 8 hours (08 Dec 2021 16:51)      MEDICATIONS  (STANDING):  ALBUTerol    90 MICROgram(s) HFA Inhaler 2 Puff(s) Inhalation every 6 hours  chlorhexidine 0.12% Liquid 15 milliLiter(s) Oral Mucosa every 12 hours  dextrose 40% Gel 15 Gram(s) Oral once  dextrose 5%. 1000 milliLiter(s) (50 mL/Hr) IV Continuous <Continuous>  dextrose 5%. 1000 milliLiter(s) (100 mL/Hr) IV Continuous <Continuous>  dextrose 50% Injectable 25 Gram(s) IV Push once  dextrose 50% Injectable 12.5 Gram(s) IV Push once  dextrose 50% Injectable 25 Gram(s) IV Push once  glucagon  Injectable 1 milliGRAM(s) IntraMuscular once  insulin lispro (ADMELOG) corrective regimen sliding scale   SubCutaneous every 6 hours  methocarbamol 250 milliGRAM(s) Oral two times a day  midodrine 5 milliGRAM(s) Oral every 8 hours  pantoprazole  Injectable 40 milliGRAM(s) IV Push every 12 hours  piperacillin/tazobactam IVPB.. 3.375 Gram(s) IV Intermittent every 8 hours  polyethylene glycol 3350 17 Gram(s) Oral every 12 hours  senna 2 Tablet(s) Oral at bedtime  simethicone 80 milliGRAM(s) Chew every 6 hours  sodium chloride 0.9%. 1000 milliLiter(s) (75 mL/Hr) IV Continuous <Continuous>  sucralfate suspension 1 Gram(s) Enteral Tube every 6 hours  tiotropium 18 MICROgram(s) Capsule 1 Capsule(s) Inhalation daily    MEDICATIONS  (PRN):  acetaminophen     Tablet .. 650 milliGRAM(s) Oral every 6 hours PRN Temp greater or equal to 38C (100.4F), Mild Pain (1 - 3)  LORazepam     Tablet 1 milliGRAM(s) Oral every 8 hours PRN Agitation      Diet, NPO with Tube Feed:   Tube Feeding Modality: Gastrostomy  Glucerna 1.5 Horacio  Total Volume for 24 Hours (mL): 1000  Continuous  Starting Tube Feed Rate mL per Hour: 20  Increase Tube Feed Rate by (mL): 10     Every 6 hours  Until Goal Tube Feed Rate (mL per Hour): 50  Tube Feed Duration (in Hours): 20  Tube Feed Start Time: 17:00 (21 @ 16:49) [Pending Verification By Attending]  Diet, NPO (21 @ 16:34) [Active]          Vital Signs Last 24 Hrs  T(C): 36.9 (10 Dec 2021 04:05), Max: 37.2 (09 Dec 2021 08:56)  T(F): 98.5 (10 Dec 2021 04:05), Max: 99 (09 Dec 2021 08:56)  HR: 121 (10 Dec 2021 04:41) (72 - 121)  BP: 116/51 (10 Dec 2021 02:00) (99/47 - 116/51)  BP(mean): 70 (10 Dec 2021 02:00) (62 - 74)  RR: 35 (10 Dec 2021 02:00) (3 - 35)  SpO2: 88% (10 Dec 2021 04:41) (88% - 100%)      21 @ 07:01  -  21 @ 07:00  --------------------------------------------------------  IN: 1625 mL / OUT: 900 mL / NET: 725 mL    21 @ 07:01  -  12-10-21 @ 06:12  --------------------------------------------------------  IN: 0 mL / OUT: 1200 mL / NET: -1200 mL        Mode: AC/ CMV (Assist Control/ Continuous Mandatory Ventilation), RR (machine): 18, TV (machine): 400, FiO2: 40, PEEP: 5, PS: , ITime: 1, MAP: 5, PIP: 15      LABS:                        8.7    9.43  )-----------( 183      ( 09 Dec 2021 20:48 )             27.7         131<L>  |  95<L>  |  69<H>  ----------------------------<  91  3.6   |  28  |  1.52<H>    Ca    8.3<L>      09 Dec 2021 07:09  Phos  2.7         TPro  6.5  /  Alb  2.0<L>  /  TBili  1.0  /  DBili  x   /  AST  54<H>  /  ALT  49  /  AlkPhos  313<H>      PT/INR - ( 09 Dec 2021 07:09 )   PT: 13.2 sec;   INR: 1.10 ratio         PTT - ( 08 Dec 2021 11:58 )  PTT:33.8 sec  Urinalysis Basic - ( 08 Dec 2021 14:24 )    Color: Yellow / Appearance: Clear / S.010 / pH: x  Gluc: x / Ketone: Negative  / Bili: Negative / Urobili: Negative mg/dL   Blood: x / Protein: 30 mg/dL / Nitrite: Negative   Leuk Esterase: Moderate / RBC: 0-2 /HPF / WBC 3-5   Sq Epi: x / Non Sq Epi: x / Bacteria: Occasional            WBC:  WBC Count: 9.43 K/uL ( @ 20:48)  WBC Count: 8.82 K/uL ( @ 07:09)  WBC Count: 6.92 K/uL ( @ 23:10)  WBC Count: 8.30 K/uL ( @ 20:11)  WBC Count: 12.18 K/uL ( @ 11:58)      MICROBIOLOGY:  RECENT CULTURES:   Clean Catch Clean Catch (Midstream) XXXX XXXX   <10,000 CFU/mL Normal Urogenital Elvira     .Blood Blood-Peripheral XXXX XXXX   No growth to date.                PT/INR - ( 09 Dec 2021 07:09 )   PT: 13.2 sec;   INR: 1.10 ratio         PTT - ( 08 Dec 2021 11:58 )  PTT:33.8 sec    Sodium:  Sodium, Serum: 131 mmol/L ( @ 07:09)  Sodium, Serum: 129 mmol/L ( @ 23:10)  Sodium, Serum: 127 mmol/L ( @ 11:58)      1.52 mg/dL  @ 07:09  1.65 mg/dL  @ 23:10  2.00 mg/dL  @ 11:58      Hemoglobin:  Hemoglobin: 8.7 g/dL ( @ 20:48)  Hemoglobin: 8.6 g/dL ( @ 07:09)  Hemoglobin: 9.7 g/dL ( @ 23:10)  Hemoglobin: 8.2 g/dL ( @ 20:11)  Hemoglobin: 5.2 g/dL ( @ 11:58)      Platelets: Platelet Count - Automated: 183 K/uL ( @ 20:48)  Platelet Count - Automated: 153 K/uL ( @ 07:09)  Platelet Count - Automated: 146 K/uL ( @ 23:10)  Platelet Count - Automated: 132 K/uL ( @ 20:11)  Platelet Count - Automated: 160 K/uL ( @ 11:58)      LIVER FUNCTIONS - ( 09 Dec 2021 07:09 )  Alb: 2.0 g/dL / Pro: 6.5 g/dL / ALK PHOS: 313 U/L / ALT: 49 U/L DA / AST: 54 U/L / GGT: x             Urinalysis Basic - ( 08 Dec 2021 14:24 )    Color: Yellow / Appearance: Clear / S.010 / pH: x  Gluc: x / Ketone: Negative  / Bili: Negative / Urobili: Negative mg/dL   Blood: x / Protein: 30 mg/dL / Nitrite: Negative   Leuk Esterase: Moderate / RBC: 0-2 /HPF / WBC 3-5   Sq Epi: x / Non Sq Epi: x / Bacteria: Occasional        RADIOLOGY & ADDITIONAL STUDIES:      MICROBIOLOGY:  RECENT CULTURES:   Clean Catch Clean Catch (Midstream) XXXX XXXX   <10,000 CFU/mL Normal Urogenital Elvira     .Blood Blood-Peripheral XXXX XXXX   No growth to date.

## 2021-12-10 NOTE — PROGRESS NOTE ADULT - SUBJECTIVE AND OBJECTIVE BOX
Date/Time Patient Seen:  		  Referring MD:   Data Reviewed	       Patient is a 76y old  Female who presents with a chief complaint of Anemia (10 Dec 2021 08:49)      Subjective/HPI     PAST MEDICAL & SURGICAL HISTORY:  Dementia of frontal lobe type    Aphasic stroke    Diabetes mellitus    Respiratory failure    Hypertension    GERD (gastroesophageal reflux disease)    Constipation    Respiratory failure    CVA (cerebral vascular accident)    HTN (hypertension)    DM (diabetes mellitus)    Advanced dementia    COVID-19 virus detected    Quadriplegia    Pneumonia    Type II diabetes mellitus    Hx of appendectomy    Gastrostomy in place    Tracheostomy in place    Tracheostomy tube present    Feeding by G-tube          Medication list         MEDICATIONS  (STANDING):  ALBUTerol    90 MICROgram(s) HFA Inhaler 2 Puff(s) Inhalation every 6 hours  chlorhexidine 0.12% Liquid 15 milliLiter(s) Oral Mucosa every 12 hours  dextrose 40% Gel 15 Gram(s) Oral once  dextrose 5%. 1000 milliLiter(s) (50 mL/Hr) IV Continuous <Continuous>  dextrose 5%. 1000 milliLiter(s) (100 mL/Hr) IV Continuous <Continuous>  dextrose 50% Injectable 25 Gram(s) IV Push once  dextrose 50% Injectable 12.5 Gram(s) IV Push once  dextrose 50% Injectable 25 Gram(s) IV Push once  glucagon  Injectable 1 milliGRAM(s) IntraMuscular once  insulin lispro (ADMELOG) corrective regimen sliding scale   SubCutaneous every 6 hours  methocarbamol 250 milliGRAM(s) Oral two times a day  midodrine 5 milliGRAM(s) Oral every 8 hours  pantoprazole  Injectable 40 milliGRAM(s) IV Push every 12 hours  piperacillin/tazobactam IVPB.. 3.375 Gram(s) IV Intermittent every 8 hours  polyethylene glycol 3350 17 Gram(s) Oral every 12 hours  senna 2 Tablet(s) Oral at bedtime  simethicone 80 milliGRAM(s) Chew every 6 hours  sodium chloride 0.9%. 1000 milliLiter(s) (75 mL/Hr) IV Continuous <Continuous>  sucralfate suspension 1 Gram(s) Enteral Tube every 6 hours  tiotropium 18 MICROgram(s) Capsule 1 Capsule(s) Inhalation daily    MEDICATIONS  (PRN):  acetaminophen     Tablet .. 650 milliGRAM(s) Oral every 6 hours PRN Temp greater or equal to 38C (100.4F), Mild Pain (1 - 3)  LORazepam     Tablet 1 milliGRAM(s) Oral every 8 hours PRN Agitation         Vitals log        ICU Vital Signs Last 24 Hrs  T(C): 37 (10 Dec 2021 08:31), Max: 37 (10 Dec 2021 08:31)  T(F): 98.6 (10 Dec 2021 08:31), Max: 98.6 (10 Dec 2021 08:31)  HR: 110 (10 Dec 2021 07:58) (72 - 121)  BP: 116/51 (10 Dec 2021 02:00) (99/53 - 116/51)  BP(mean): 70 (10 Dec 2021 02:00) (63 - 74)  ABP: --  ABP(mean): --  RR: 35 (10 Dec 2021 02:00) (3 - 35)  SpO2: 100% (10 Dec 2021 07:58) (88% - 100%)       Mode: AC/ CMV (Assist Control/ Continuous Mandatory Ventilation)  RR (machine): 18  TV (machine): 400  FiO2: 40  PEEP: 5  PS:   ITime: 0.1  MAP: 10  PIP: 22      Input and Output:  I&O's Detail    09 Dec 2021 07:01  -  10 Dec 2021 07:00  --------------------------------------------------------  IN:    IV PiggyBack: 100 mL    sodium chloride 0.9%: 900 mL  Total IN: 1000 mL    OUT:    Indwelling Catheter - Urethral (mL): 1200 mL  Total OUT: 1200 mL    Total NET: -200 mL          Lab Data                        9.3    13.27 )-----------( 232      ( 10 Dec 2021 08:38 )             29.9     12-10    136  |  100  |  49<H>  ----------------------------<  235<H>  3.9   |  26  |  1.47<H>    Ca    8.6      10 Dec 2021 08:38  Phos  2.7     12-09    TPro  7.0  /  Alb  2.1<L>  /  TBili  0.8  /  DBili  x   /  AST  42<H>  /  ALT  42  /  AlkPhos  245<H>  12-10            Review of Systems	      Objective     Physical Examination    heart s1s2  lung dc BS  abd soft      Pertinent Lab findings & Imaging      Annalise:  NO   Adequate UO     I&O's Detail    09 Dec 2021 07:01  -  10 Dec 2021 07:00  --------------------------------------------------------  IN:    IV PiggyBack: 100 mL    sodium chloride 0.9%: 900 mL  Total IN: 1000 mL    OUT:    Indwelling Catheter - Urethral (mL): 1200 mL  Total OUT: 1200 mL    Total NET: -200 mL               Discussed with:     Cultures:	        Radiology

## 2021-12-10 NOTE — PROGRESS NOTE ADULT - SUBJECTIVE AND OBJECTIVE BOX
Patient is a 76y old  Female who presents with a chief complaint of Anemia (09 Dec 2021 15:21)      BRIEF HOSPITAL COURSE:   Patient is a 77 y/o F w/ pmh of htn, dm, CVA, dementia, recent hx of cardiac arrest at [facility], chronic resp failure s/p trach/peg presented to [facility] on 2021 from SNF for abnormal labs. Pt in the ED pt was found to be anemic, RYAN and Hyponatermia, pt was given 2U of PRBC and h/h improved from 5.2/16.9----->9.7/30.9.        Events last 24 hours:   cbc stable  remains on vent         PAST MEDICAL & SURGICAL HISTORY:  Dementia of frontal lobe type    Aphasic stroke    Diabetes mellitus    Respiratory failure    Hypertension    GERD (gastroesophageal reflux disease)    Constipation    Respiratory failure    CVA (cerebral vascular accident)    HTN (hypertension)    DM (diabetes mellitus)    Advanced dementia    COVID-19 virus detected    Quadriplegia    Pneumonia    Type II diabetes mellitus    Hx of appendectomy    Gastrostomy in place    Tracheostomy in place    Tracheostomy tube present    Feeding by G-tube      Allergies    codeine (Hives)    Intolerances      FAMILY HISTORY:  No pertinent family history in first degree relatives        Review of Systems:  unable to obtain , pt is vented     Medications:  piperacillin/tazobactam IVPB.. 3.375 Gram(s) IV Intermittent every 8 hours    midodrine 5 milliGRAM(s) Oral every 8 hours    ALBUTerol    90 MICROgram(s) HFA Inhaler 2 Puff(s) Inhalation every 6 hours  tiotropium 18 MICROgram(s) Capsule 1 Capsule(s) Inhalation daily    acetaminophen     Tablet .. 650 milliGRAM(s) Oral every 6 hours PRN  LORazepam     Tablet 1 milliGRAM(s) Oral every 8 hours PRN  methocarbamol 250 milliGRAM(s) Oral two times a day        pantoprazole  Injectable 40 milliGRAM(s) IV Push every 12 hours  polyethylene glycol 3350 17 Gram(s) Oral every 12 hours  senna 2 Tablet(s) Oral at bedtime  simethicone 80 milliGRAM(s) Chew every 6 hours  sucralfate suspension 1 Gram(s) Enteral Tube every 6 hours      dextrose 40% Gel 15 Gram(s) Oral once  dextrose 50% Injectable 25 Gram(s) IV Push once  dextrose 50% Injectable 12.5 Gram(s) IV Push once  dextrose 50% Injectable 25 Gram(s) IV Push once  glucagon  Injectable 1 milliGRAM(s) IntraMuscular once  insulin lispro (ADMELOG) corrective regimen sliding scale   SubCutaneous every 6 hours    dextrose 5%. 1000 milliLiter(s) IV Continuous <Continuous>  dextrose 5%. 1000 milliLiter(s) IV Continuous <Continuous>  sodium chloride 0.9%. 1000 milliLiter(s) IV Continuous <Continuous>      chlorhexidine 0.12% Liquid 15 milliLiter(s) Oral Mucosa every 12 hours        Mode: AC/ CMV (Assist Control/ Continuous Mandatory Ventilation)  RR (machine): 18  TV (machine): 450  FiO2: 40  PEEP: 5  ITime: 1  MAP: 10  PIP: 22      ICU Vital Signs Last 24 Hrs  T(C): 35.7 (09 Dec 2021 21:16), Max: 37.2 (09 Dec 2021 08:56)  T(F): 96.3 (09 Dec 2021 21:16), Max: 99 (09 Dec 2021 08:56)  HR: 79 (09 Dec 2021 20:49) (72 - 83)  BP: 109/56 (09 Dec 2021 20:00) (82/43 - 112/52)  BP(mean): 71 (09 Dec 2021 20:00) (56 - 71)  ABP: --  ABP(mean): --  RR: 20 (09 Dec 2021 20:00) (3 - 29)  SpO2: 96% (09 Dec 2021 20:49) (88% - 100%)    Vital Signs Last 24 Hrs  T(C): 35.7 (09 Dec 2021 21:16), Max: 37.2 (09 Dec 2021 08:56)  T(F): 96.3 (09 Dec 2021 21:16), Max: 99 (09 Dec 2021 08:56)  HR: 79 (09 Dec 2021 20:49) (72 - 83)  BP: 109/56 (09 Dec 2021 20:00) (82/43 - 112/52)  BP(mean): 71 (09 Dec 2021 20:00) (56 - 71)  RR: 20 (09 Dec 2021 20:00) (3 - 29)  SpO2: 96% (09 Dec 2021 20:49) (88% - 100%)        I&O's Detail    08 Dec 2021 07:01  -  09 Dec 2021 07:00  --------------------------------------------------------  IN:    IV PiggyBack: 450 mL    Lactated Ringers: 300 mL    Lactated Ringers Bolus: 500 mL    sodium chloride 0.9%: 375 mL  Total IN: 1625 mL    OUT:    Indwelling Catheter - Urethral (mL): 900 mL  Total OUT: 900 mL    Total NET: 725 mL      09 Dec 2021 07:01  -  10 Dec 2021 00:03  --------------------------------------------------------  IN:  Total IN: 0 mL    OUT:    Indwelling Catheter - Urethral (mL): 700 mL  Total OUT: 700 mL    Total NET: -700 mL            LABS:                        8.7    9.43  )-----------( 183      ( 09 Dec 2021 20:48 )             27.7     12-09    131<L>  |  95<L>  |  69<H>  ----------------------------<  91  3.6   |  28  |  1.52<H>    Ca    8.3<L>      09 Dec 2021 07:09  Phos  2.7     12-09    TPro  6.5  /  Alb  2.0<L>  /  TBili  1.0  /  DBili  x   /  AST  54<H>  /  ALT  49  /  AlkPhos  313<H>  12-09          CAPILLARY BLOOD GLUCOSE      POCT Blood Glucose.: 143 mg/dL (09 Dec 2021 23:00)    PT/INR - ( 09 Dec 2021 07:09 )   PT: 13.2 sec;   INR: 1.10 ratio         PTT - ( 08 Dec 2021 11:58 )  PTT:33.8 sec  Urinalysis Basic - ( 08 Dec 2021 14:24 )    Color: Yellow / Appearance: Clear / S.010 / pH: x  Gluc: x / Ketone: Negative  / Bili: Negative / Urobili: Negative mg/dL   Blood: x / Protein: 30 mg/dL / Nitrite: Negative   Leuk Esterase: Moderate / RBC: 0-2 /HPF / WBC 3-5   Sq Epi: x / Non Sq Epi: x / Bacteria: Occasional      CULTURES:  Culture Results:   <10,000 CFU/mL Normal Urogenital Elvira ( @ 17:55)  Culture Results:   No growth to date. ( @ 15:24)  Culture Results:   No growth to date. ( @ 15:24)      Physical Examination:    General: Elderly female in bed, contracted     HEENT: Pupils equal, reactive to light.  Symmetric.    PULM: b/l air entry    CVS: +s1/s2    ABD: Soft, nondistended, nontender, normoactive bowel sounds, no masses + peg     EXT: + edema, nontender    SKIN: Warm and well perfused,    Neuro: unresponsive     RADIOLOGY:   < from: CT Abdomen and Pelvis No Cont (21 @ 16:52) >  CONTRAST/COMPLICATIONS:  IV Contrast: NONE  Oral Contrast: NONE  Complications: None reported at time of study completion    PROCEDURE:  CT of the Chest, Abdomen and Pelvis was performed.  Sagittal and coronal reformats were performed.    FINDINGS:    CHEST:  LUNGS AND LARGE AIRWAYS: Tracheostomy terminates above the parmjit. There   are multifocal lung parenchymal opacities most prominent in dependent   locations, concerning for multifocal infection superimposed ondistal   airways impaction and atelectasis. Most of the opacities are either new   or unchanged since prior study. However, volume loss of the right lower   lobe has improved since 2021. Redemonstrated calcified material   again likely reflects aspirated contents.  PLEURA: Trace right and small left pleural effusions. No pneumothorax.  VESSELS: Normal caliber of the thoracic aorta with atheromatous change.   Main pulmonary artery size is within normal limits.  HEART: Heart size is qualitatively normal. Small pericardial fluid is   similar to prior. There is coronary artery calcification and mitral   calcification. Low attenuation of the blood pool of the heart may reflect   anemia.  MEDIASTINUM AND JHONNY: Prominent/mildly enlarged mediastinal and hilar   nodes are redemonstrated. The esophagus is distended with air probable   left-sided diverticulum at the thyroid level, image 15 series 4, similar   to prior.  CHEST WALL AND LOWER NECK: Symmetric appearance of the chest wall   musculature, without evidence of hematoma. Soft tissue edema is noted.    ABDOMEN AND PELVIS:    Solid organ evaluation limited due to noncontrast technique.    LIVER: Enlarged liver measuring 21 cm in craniocaudal dimension.  BILE DUCTS: No biliary distention.  GALLBLADDER: Cholelithiasis.  SPLEEN: Borderline enlarged measuring 13 cm in craniocaudal dimension.  PANCREAS: No main pancreatic ductal dilatation.  ADRENALS: Unchanged adrenal gland thickening.  KIDNEYS/URETERS: No hydronephrosis. Nonspecific bilateral perinephric   stranding.    BLADDER: Woody catheter. Urinary bladder is underdistended, suboptimally   characterized.  REPRODUCTIVE ORGANS: Uterus and adnexa suboptimally characterized on   noncontrast CT.    BOWEL: Gastrostomy tube terminates in the stomach. Oral contrast seen   within the mildly distended stomach and along small and large bowel   loops. No significant small bowel distention. Rectum is distended with   air and fluid, similar to prior.  PERITONEUM: No ascites, free air, or loculated fluid collection.  VESSELS: No aneurysm of the abdominal aorta. Aortoiliac and proximal   femoral atheromatous change.  RETROPERITONEUM/LYMPH NODES: Small volume nodes.  ABDOMINAL WALL: Symmetric appearance of the abdominal wall and   retroperitoneal musculature. Mild soft tissue edema.  BONES: Chronic fracture deformity of the partially imaged left femur,   with surrounding callus, redemonstrated. Diffuse osseous demineralization   and multilevel degenerative changes limits evaluation of the bones.   Central canal and neural foramina are not adequately assessed on this   study. Fixation hardware of the distal left radius limited in evaluation   due to positioning.    IMPRESSION:    Limited study without IV contrast.    CHEST:  1.Tracheostomy. Distal airways impaction and multifocal pneumonia,   possibly related to aspiration. Most of the opacities are either new or   unchanged since prior study. However, volume loss of the right lower lobe   has improved since 2021. Small left pleural effusion and trace right   pleural effusion, similar to prior. Follow to resolution with repeat   chest CT in 4 weeks.  2. Esophagus is distended with air probable left-sided diverticulum at   the thyroid level, image 15 series 4, similar to prior.  3. Coronary artery calcification and mitral calcification. Low   attenuation of the blood pool of the heart may reflect anemia.    ABDOMEN/PELVIS:  1. Mild chest and abdominal wall soft tissue edema. Symmetric appearance   of the abdominal wall and retroperitoneal musculature, without evidence   of hematoma. Correlate with clinical status of the patient, serial   hemoglobin and hematocrit to determine need for follow-up postcontrast   imaging.  2. No hydronephrosis of the kidneys.

## 2021-12-11 LAB
ALBUMIN SERPL ELPH-MCNC: 2 G/DL — LOW (ref 3.3–5)
ALP SERPL-CCNC: 221 U/L — HIGH (ref 30–120)
ALT FLD-CCNC: 32 U/L DA — SIGNIFICANT CHANGE UP (ref 10–60)
ANION GAP SERPL CALC-SCNC: 10 MMOL/L — SIGNIFICANT CHANGE UP (ref 5–17)
AST SERPL-CCNC: 28 U/L — SIGNIFICANT CHANGE UP (ref 10–40)
BILIRUB SERPL-MCNC: 0.5 MG/DL — SIGNIFICANT CHANGE UP (ref 0.2–1.2)
BUN SERPL-MCNC: 37 MG/DL — HIGH (ref 7–23)
CALCIUM SERPL-MCNC: 8.3 MG/DL — LOW (ref 8.4–10.5)
CHLORIDE SERPL-SCNC: 103 MMOL/L — SIGNIFICANT CHANGE UP (ref 96–108)
CO2 SERPL-SCNC: 24 MMOL/L — SIGNIFICANT CHANGE UP (ref 22–31)
CREAT SERPL-MCNC: 1.22 MG/DL — SIGNIFICANT CHANGE UP (ref 0.5–1.3)
GLUCOSE BLDC GLUCOMTR-MCNC: 159 MG/DL — HIGH (ref 70–99)
GLUCOSE BLDC GLUCOMTR-MCNC: 173 MG/DL — HIGH (ref 70–99)
GLUCOSE BLDC GLUCOMTR-MCNC: 183 MG/DL — HIGH (ref 70–99)
GLUCOSE BLDC GLUCOMTR-MCNC: 204 MG/DL — HIGH (ref 70–99)
GLUCOSE SERPL-MCNC: 175 MG/DL — HIGH (ref 70–99)
GRAM STN FLD: SIGNIFICANT CHANGE UP
HCT VFR BLD CALC: 31 % — LOW (ref 34.5–45)
HGB BLD-MCNC: 9.4 G/DL — LOW (ref 11.5–15.5)
MCHC RBC-ENTMCNC: 25.8 PG — LOW (ref 27–34)
MCHC RBC-ENTMCNC: 30.3 GM/DL — LOW (ref 32–36)
MCV RBC AUTO: 84.9 FL — SIGNIFICANT CHANGE UP (ref 80–100)
MRSA PCR RESULT.: SIGNIFICANT CHANGE UP
NRBC # BLD: 0 /100 WBCS — SIGNIFICANT CHANGE UP (ref 0–0)
PLATELET # BLD AUTO: 224 K/UL — SIGNIFICANT CHANGE UP (ref 150–400)
POTASSIUM SERPL-MCNC: 3.6 MMOL/L — SIGNIFICANT CHANGE UP (ref 3.5–5.3)
POTASSIUM SERPL-SCNC: 3.6 MMOL/L — SIGNIFICANT CHANGE UP (ref 3.5–5.3)
PROT SERPL-MCNC: 7 G/DL — SIGNIFICANT CHANGE UP (ref 6–8.3)
RBC # BLD: 3.65 M/UL — LOW (ref 3.8–5.2)
RBC # FLD: 17.2 % — HIGH (ref 10.3–14.5)
S AUREUS DNA NOSE QL NAA+PROBE: SIGNIFICANT CHANGE UP
SODIUM SERPL-SCNC: 137 MMOL/L — SIGNIFICANT CHANGE UP (ref 135–145)
SPECIMEN SOURCE: SIGNIFICANT CHANGE UP
WBC # BLD: 6.98 K/UL — SIGNIFICANT CHANGE UP (ref 3.8–10.5)
WBC # FLD AUTO: 6.98 K/UL — SIGNIFICANT CHANGE UP (ref 3.8–10.5)

## 2021-12-11 PROCEDURE — 99233 SBSQ HOSP IP/OBS HIGH 50: CPT

## 2021-12-11 RX ADMIN — ALBUTEROL 2 PUFF(S): 90 AEROSOL, METERED ORAL at 14:41

## 2021-12-11 RX ADMIN — Medication 1 GRAM(S): at 05:12

## 2021-12-11 RX ADMIN — Medication 1 GRAM(S): at 17:17

## 2021-12-11 RX ADMIN — PANTOPRAZOLE SODIUM 40 MILLIGRAM(S): 20 TABLET, DELAYED RELEASE ORAL at 17:17

## 2021-12-11 RX ADMIN — CHLORHEXIDINE GLUCONATE 15 MILLILITER(S): 213 SOLUTION TOPICAL at 05:12

## 2021-12-11 RX ADMIN — Medication 2: at 05:13

## 2021-12-11 RX ADMIN — HEPARIN SODIUM 5000 UNIT(S): 5000 INJECTION INTRAVENOUS; SUBCUTANEOUS at 05:11

## 2021-12-11 RX ADMIN — Medication 1 MILLIGRAM(S): at 00:26

## 2021-12-11 RX ADMIN — SODIUM CHLORIDE 65 MILLILITER(S): 9 INJECTION INTRAMUSCULAR; INTRAVENOUS; SUBCUTANEOUS at 03:14

## 2021-12-11 RX ADMIN — SIMETHICONE 80 MILLIGRAM(S): 80 TABLET, CHEWABLE ORAL at 17:17

## 2021-12-11 RX ADMIN — METHOCARBAMOL 250 MILLIGRAM(S): 500 TABLET, FILM COATED ORAL at 05:11

## 2021-12-11 RX ADMIN — METHOCARBAMOL 250 MILLIGRAM(S): 500 TABLET, FILM COATED ORAL at 17:17

## 2021-12-11 RX ADMIN — POLYETHYLENE GLYCOL 3350 17 GRAM(S): 17 POWDER, FOR SOLUTION ORAL at 17:18

## 2021-12-11 RX ADMIN — Medication 1 GRAM(S): at 11:30

## 2021-12-11 RX ADMIN — Medication 2: at 11:35

## 2021-12-11 RX ADMIN — SIMETHICONE 80 MILLIGRAM(S): 80 TABLET, CHEWABLE ORAL at 05:11

## 2021-12-11 RX ADMIN — PIPERACILLIN AND TAZOBACTAM 25 GRAM(S): 4; .5 INJECTION, POWDER, LYOPHILIZED, FOR SOLUTION INTRAVENOUS at 05:13

## 2021-12-11 RX ADMIN — Medication 1 MILLIGRAM(S): at 08:05

## 2021-12-11 RX ADMIN — PANTOPRAZOLE SODIUM 40 MILLIGRAM(S): 20 TABLET, DELAYED RELEASE ORAL at 05:12

## 2021-12-11 RX ADMIN — SENNA PLUS 2 TABLET(S): 8.6 TABLET ORAL at 21:03

## 2021-12-11 RX ADMIN — MIDODRINE HYDROCHLORIDE 5 MILLIGRAM(S): 2.5 TABLET ORAL at 14:41

## 2021-12-11 RX ADMIN — MIDODRINE HYDROCHLORIDE 5 MILLIGRAM(S): 2.5 TABLET ORAL at 21:02

## 2021-12-11 RX ADMIN — Medication 1 MILLIGRAM(S): at 21:34

## 2021-12-11 RX ADMIN — PIPERACILLIN AND TAZOBACTAM 25 GRAM(S): 4; .5 INJECTION, POWDER, LYOPHILIZED, FOR SOLUTION INTRAVENOUS at 21:03

## 2021-12-11 RX ADMIN — PIPERACILLIN AND TAZOBACTAM 25 GRAM(S): 4; .5 INJECTION, POWDER, LYOPHILIZED, FOR SOLUTION INTRAVENOUS at 14:41

## 2021-12-11 RX ADMIN — HEPARIN SODIUM 5000 UNIT(S): 5000 INJECTION INTRAVENOUS; SUBCUTANEOUS at 18:08

## 2021-12-11 RX ADMIN — CHLORHEXIDINE GLUCONATE 15 MILLILITER(S): 213 SOLUTION TOPICAL at 17:17

## 2021-12-11 RX ADMIN — ALBUTEROL 2 PUFF(S): 90 AEROSOL, METERED ORAL at 20:23

## 2021-12-11 RX ADMIN — POLYETHYLENE GLYCOL 3350 17 GRAM(S): 17 POWDER, FOR SOLUTION ORAL at 05:12

## 2021-12-11 RX ADMIN — Medication 1 MILLIGRAM(S): at 21:08

## 2021-12-11 RX ADMIN — Medication 4: at 17:18

## 2021-12-11 RX ADMIN — ALBUTEROL 2 PUFF(S): 90 AEROSOL, METERED ORAL at 00:26

## 2021-12-11 RX ADMIN — SIMETHICONE 80 MILLIGRAM(S): 80 TABLET, CHEWABLE ORAL at 11:30

## 2021-12-11 RX ADMIN — ALBUTEROL 2 PUFF(S): 90 AEROSOL, METERED ORAL at 06:48

## 2021-12-11 RX ADMIN — Medication 2: at 23:59

## 2021-12-11 RX ADMIN — MIDODRINE HYDROCHLORIDE 5 MILLIGRAM(S): 2.5 TABLET ORAL at 05:11

## 2021-12-11 NOTE — PROGRESS NOTE ADULT - SUBJECTIVE AND OBJECTIVE BOX
BREA BECKHAM    [facility]    Allergies    codeine (Hives)    Intolerances        PAST MEDICAL & SURGICAL HISTORY:  Dementia of frontal lobe type    Aphasic stroke    Diabetes mellitus    Respiratory failure    Hypertension    GERD (gastroesophageal reflux disease)    Constipation    Respiratory failure    CVA (cerebral vascular accident)    HTN (hypertension)    DM (diabetes mellitus)    Advanced dementia    COVID-19 virus detected    Quadriplegia    Pneumonia    Type II diabetes mellitus    Hx of appendectomy    Gastrostomy in place    Tracheostomy in place    Tracheostomy tube present    Feeding by G-tube        FAMILY HISTORY:  No pertinent family history in first degree relatives        Home Medications:  albuterol 90 mcg/inh inhalation aerosol: 2 puff(s) inhaled every 6 hours (08 Dec 2021 16:51)  Bacid (LAC) oral tablet: 2 tab(s) by gastrostomy tube once a day (08 Dec 2021 16:51)  Carafate 1 g/10 mL oral suspension: 10 milliliter(s) by gastrostomy tube 4 times a day (before meals and at bedtime) for 14 days (Started 6/4/21) (08 Dec 2021 16:51)  chlorhexidine 0.12% mucous membrane liquid: 15 milliliter(s) mucous membrane 2 times a day (08 Dec 2021 16:51)  insulin lispro 100 units/mL injectable solution:  injectable; sliding scale coverage  (08 Dec 2021 16:51)  ipratropium-albuterol 0.5 mg-2.5 mg/3 mL inhalation solution: 3 milliliter(s) inhaled 4 times a day (08 Dec 2021 16:51)  Lantus 100 units/mL subcutaneous solution: 36 unit(s) subcutaneous once a day (at bedtime) (08 Dec 2021 16:51)  LORazepam 1 mg oral tablet: 1 tab(s) by gastrostomy tube 3 times a day, As Needed (08 Dec 2021 16:51)  methocarbamol: 250 milligram(s) by gastrostomy tube 2 times a day (08 Dec 2021 16:51)  pantoprazole 40 mg oral granule, delayed release: 40 milligram(s) by gastrostomy tube 2 times a day (08 Dec 2021 16:51)  polyethylene glycol 3350 oral powder for reconstitution: 17 gram(s) orally every 12 hours (08 Dec 2021 16:51)  ProAir HFA 90 mcg/inh inhalation aerosol: 2 puff(s) inhaled every 6 hours (08 Dec 2021 16:51)  senna 8.6 mg oral tablet: 3 tab(s) by gastrostomy tube once a day (at bedtime) (08 Dec 2021 16:51)  simethicone 80 mg oral tablet, chewable: 1 tab(s) orally every 6 hours (08 Dec 2021 16:51)  Tylenol 325 mg oral tablet: 2 tab(s) by gastrostomy tube once a day; 60 minutes prior to dressing change  (08 Dec 2021 16:51)  Zosyn 3 g-0.375 g/50 mL intravenous solution: intravenous every 8 hours (08 Dec 2021 16:51)      MEDICATIONS  (STANDING):  ALBUTerol    90 MICROgram(s) HFA Inhaler 2 Puff(s) Inhalation every 6 hours  chlorhexidine 0.12% Liquid 15 milliLiter(s) Oral Mucosa every 12 hours  dextrose 40% Gel 15 Gram(s) Oral once  dextrose 5%. 1000 milliLiter(s) (50 mL/Hr) IV Continuous <Continuous>  dextrose 5%. 1000 milliLiter(s) (100 mL/Hr) IV Continuous <Continuous>  dextrose 50% Injectable 25 Gram(s) IV Push once  dextrose 50% Injectable 12.5 Gram(s) IV Push once  dextrose 50% Injectable 25 Gram(s) IV Push once  glucagon  Injectable 1 milliGRAM(s) IntraMuscular once  heparin   Injectable 5000 Unit(s) SubCutaneous every 12 hours  insulin lispro (ADMELOG) corrective regimen sliding scale   SubCutaneous every 6 hours  methocarbamol 250 milliGRAM(s) Oral two times a day  midodrine 5 milliGRAM(s) Oral every 8 hours  pantoprazole  Injectable 40 milliGRAM(s) IV Push every 12 hours  piperacillin/tazobactam IVPB.. 3.375 Gram(s) IV Intermittent every 8 hours  polyethylene glycol 3350 17 Gram(s) Oral every 12 hours  senna 2 Tablet(s) Oral at bedtime  simethicone 80 milliGRAM(s) Chew every 6 hours  sodium chloride 0.9%. 1000 milliLiter(s) (65 mL/Hr) IV Continuous <Continuous>  sucralfate suspension 1 Gram(s) Enteral Tube every 6 hours    MEDICATIONS  (PRN):  acetaminophen     Tablet .. 650 milliGRAM(s) Oral every 6 hours PRN Temp greater or equal to 38C (100.4F), Mild Pain (1 - 3)  LORazepam     Tablet 1 milliGRAM(s) Oral every 8 hours PRN Agitation      Diet, NPO with Tube Feed:   Tube Feeding Modality: Gastrostomy  Glucerna 1.5 Horacio  Total Volume for 24 Hours (mL): 1000  Continuous  Starting Tube Feed Rate mL per Hour: 20  Increase Tube Feed Rate by (mL): 10     Every 6 hours  Until Goal Tube Feed Rate (mL per Hour): 50  Tube Feed Duration (in Hours): 20  Tube Feed Start Time: 17:00 (12-09-21 @ 16:49) [Active]          Vital Signs Last 24 Hrs  T(C): 37.2 (11 Dec 2021 08:23), Max: 37.2 (11 Dec 2021 08:23)  T(F): 99 (11 Dec 2021 08:23), Max: 99 (11 Dec 2021 08:23)  HR: 106 (11 Dec 2021 07:59) (63 - 106)  BP: 154/60 (11 Dec 2021 07:59) (85/39 - 154/60)  BP(mean): 89 (11 Dec 2021 07:59) (54 - 89)  RR: 29 (11 Dec 2021 07:59) (15 - 32)  SpO2: 97% (11 Dec 2021 07:59) (95% - 100%)      12-10-21 @ 07:01  -  12-11-21 @ 07:00  --------------------------------------------------------  IN: 3270 mL / OUT: 660 mL / NET: 2610 mL        Mode: AC/ CMV (Assist Control/ Continuous Mandatory Ventilation), RR (machine): 18, TV (machine): 400, FiO2: 40, PEEP: 5, ITime: 1, MAP: 11, PIP: 32      LABS:                        9.4    6.98  )-----------( 224      ( 11 Dec 2021 09:39 )             31.0     12-11    137  |  103  |  37<H>  ----------------------------<  175<H>  3.6   |  24  |  1.22    Ca    8.3<L>      11 Dec 2021 09:40  Phos  2.7     12-10  Mg     3.4     12-10    TPro  7.0  /  Alb  2.0<L>  /  TBili  0.5  /  DBili  x   /  AST  28  /  ALT  32  /  AlkPhos  221<H>  12-11              WBC:  WBC Count: 6.98 K/uL (12-11 @ 09:39)  WBC Count: 13.27 K/uL (12-10 @ 08:38)  WBC Count: 9.43 K/uL (12-09 @ 20:48)  WBC Count: 8.82 K/uL (12-09 @ 07:09)  WBC Count: 6.92 K/uL (12-08 @ 23:10)  WBC Count: 8.30 K/uL (12-08 @ 20:11)  WBC Count: 12.18 K/uL (12-08 @ 11:58)      MICROBIOLOGY:  RECENT CULTURES:  12-11 .Sputum Sputum XXXX   Moderate polymorphonuclear leukocytes per low power field  Rare Squamous epithelial cells per low power field  Numerous Gram Negative Rods seen per oil power field XXXX    12-08 Clean Catch Clean Catch (Midstream) XXXX XXXX   <10,000 CFU/mL Normal Urogenital Elvira    12-08 .Blood Blood-Peripheral XXXX XXXX   No growth to date.                    Sodium:  Sodium, Serum: 137 mmol/L (12-11 @ 09:40)  Sodium, Serum: 136 mmol/L (12-10 @ 08:38)  Sodium, Serum: 131 mmol/L (12-09 @ 07:09)  Sodium, Serum: 129 mmol/L (12-08 @ 23:10)  Sodium, Serum: 127 mmol/L (12-08 @ 11:58)      1.22 mg/dL 12-11 @ 09:40  1.47 mg/dL 12-10 @ 08:38  1.52 mg/dL 12-09 @ 07:09  1.65 mg/dL 12-08 @ 23:10  2.00 mg/dL 12-08 @ 11:58      Hemoglobin:  Hemoglobin: 9.4 g/dL (12-11 @ 09:39)  Hemoglobin: 9.3 g/dL (12-10 @ 08:38)  Hemoglobin: 8.7 g/dL (12-09 @ 20:48)  Hemoglobin: 8.6 g/dL (12-09 @ 07:09)  Hemoglobin: 9.7 g/dL (12-08 @ 23:10)  Hemoglobin: 8.2 g/dL (12-08 @ 20:11)  Hemoglobin: 5.2 g/dL (12-08 @ 11:58)      Platelets: Platelet Count - Automated: 224 K/uL (12-11 @ 09:39)  Platelet Count - Automated: 232 K/uL (12-10 @ 08:38)  Platelet Count - Automated: 183 K/uL (12-09 @ 20:48)  Platelet Count - Automated: 153 K/uL (12-09 @ 07:09)  Platelet Count - Automated: 146 K/uL (12-08 @ 23:10)  Platelet Count - Automated: 132 K/uL (12-08 @ 20:11)  Platelet Count - Automated: 160 K/uL (12-08 @ 11:58)      LIVER FUNCTIONS - ( 11 Dec 2021 09:40 )  Alb: 2.0 g/dL / Pro: 7.0 g/dL / ALK PHOS: 221 U/L / ALT: 32 U/L DA / AST: 28 U/L / GGT: x                 RADIOLOGY & ADDITIONAL STUDIES:      MICROBIOLOGY:  RECENT CULTURES:  12-11 .Sputum Sputum XXXX   Moderate polymorphonuclear leukocytes per low power field  Rare Squamous epithelial cells per low power field  Numerous Gram Negative Rods seen per oil power field XXXX    12-08 Clean Catch Clean Catch (Midstream) XXXX XXXX   <10,000 CFU/mL Normal Urogenital Elvira    12-08 .Blood Blood-Peripheral XXXX XXXX   No growth to date.

## 2021-12-11 NOTE — PROGRESS NOTE ADULT - SUBJECTIVE AND OBJECTIVE BOX
Chief Complaint: Abnormal labs    Interval Events: No events overnight    Review of Systems:  Unable to obtain    Physical Exam:  Vital Signs Last 24 Hrs  T(C): 37.2 (11 Dec 2021 08:23), Max: 37.2 (11 Dec 2021 08:23)  T(F): 99 (11 Dec 2021 08:23), Max: 99 (11 Dec 2021 08:23)  HR: 106 (11 Dec 2021 07:59) (63 - 106)  BP: 154/60 (11 Dec 2021 07:59) (85/39 - 154/60)  BP(mean): 89 (11 Dec 2021 07:59) (54 - 89)  RR: 29 (11 Dec 2021 07:59) (15 - 32)  SpO2: 97% (11 Dec 2021 07:59) (92% - 100%)  General: Unresponsive  HEENT: Trach  Neck: No JVD, no carotid bruit  Lungs: CTAB  CV: RRR, nl S1/S2, no M/R/G  Abdomen: S/NT/ND, +BS  Extremities: No LE edema, no cyanosis  Neuro: AAOx0  Skin: No rash    Labs:    12-10    136  |  100  |  49<H>  ----------------------------<  235<H>  3.9   |  26  |  1.47<H>    Ca    8.6      10 Dec 2021 08:38  Phos  2.7     12-10  Mg     3.4     12-10    TPro  7.0  /  Alb  2.1<L>  /  TBili  0.8  /  DBili  x   /  AST  42<H>  /  ALT  42  /  AlkPhos  245<H>  12-10                        9.4    6.98  )-----------( 224      ( 11 Dec 2021 09:39 )             31.0     Telemetry: Sinus rhythm

## 2021-12-11 NOTE — PROGRESS NOTE ADULT - SUBJECTIVE AND OBJECTIVE BOX
Neurology follow up note    BREA [id number]      Interval History:    Patient resting in bed     Allergies    codeine (Hives)    Intolerances        MEDICATIONS    acetaminophen     Tablet .. 650 milliGRAM(s) Oral every 6 hours PRN  ALBUTerol    90 MICROgram(s) HFA Inhaler 2 Puff(s) Inhalation every 6 hours  chlorhexidine 0.12% Liquid 15 milliLiter(s) Oral Mucosa every 12 hours  dextrose 40% Gel 15 Gram(s) Oral once  dextrose 5%. 1000 milliLiter(s) IV Continuous <Continuous>  dextrose 5%. 1000 milliLiter(s) IV Continuous <Continuous>  dextrose 50% Injectable 25 Gram(s) IV Push once  dextrose 50% Injectable 12.5 Gram(s) IV Push once  dextrose 50% Injectable 25 Gram(s) IV Push once  glucagon  Injectable 1 milliGRAM(s) IntraMuscular once  heparin   Injectable 5000 Unit(s) SubCutaneous every 12 hours  insulin lispro (ADMELOG) corrective regimen sliding scale   SubCutaneous every 6 hours  LORazepam     Tablet 1 milliGRAM(s) Oral every 8 hours PRN  methocarbamol 250 milliGRAM(s) Oral two times a day  midodrine 5 milliGRAM(s) Oral every 8 hours  pantoprazole  Injectable 40 milliGRAM(s) IV Push every 12 hours  piperacillin/tazobactam IVPB.. 3.375 Gram(s) IV Intermittent every 8 hours  polyethylene glycol 3350 17 Gram(s) Oral every 12 hours  senna 2 Tablet(s) Oral at bedtime  simethicone 80 milliGRAM(s) Chew every 6 hours  sodium chloride 0.9%. 1000 milliLiter(s) IV Continuous <Continuous>  sucralfate suspension 1 Gram(s) Enteral Tube every 6 hours              Vital Signs Last 24 Hrs  T(C): 37.2 (11 Dec 2021 08:23), Max: 37.2 (11 Dec 2021 08:23)  T(F): 99 (11 Dec 2021 08:23), Max: 99 (11 Dec 2021 08:23)  HR: 106 (11 Dec 2021 07:59) (63 - 106)  BP: 154/60 (11 Dec 2021 07:59) (101/57 - 154/60)  BP(mean): 89 (11 Dec 2021 07:59) (70 - 89)  RR: 29 (11 Dec 2021 07:59) (17 - 32)  SpO2: 97% (11 Dec 2021 07:59) (96% - 100%)    REVIEW OF SYSTEMS:  Could not be obtained secondary to the patient being nonverbal.    PHYSICAL EXAMINATION:    HEENT:  Head:  Normocephalic.  Eyes:  No scleral icterus.  Ears:  Hard to evaluate secondary to the patient being nonverbal.  NECK:  Had increased tone, tracheostomy was in place.  RESPIRATORY:  Decreased breath sounds bilaterally.  CARDIOVASCULAR:  S1 and S2 heard.  ABDOMEN:  Soft and nontender.  EXTREMITIES:  No clubbing or cyanosis was noted.      NEUROLOGIC:  The patient was resting in bed with eyes open.  Upon stimulation of the patient, her eyes would roll up, with subtle shaking.  Speech:  Nonverbal.  Tone in all four extremities increased.  Bilateral upper extremities were in a flexed position.  Bilateral lower extremities were in the straight position.              LABS:  CBC Full  -  ( 11 Dec 2021 09:39 )  WBC Count : 6.98 K/uL  RBC Count : 3.65 M/uL  Hemoglobin : 9.4 g/dL  Hematocrit : 31.0 %  Platelet Count - Automated : 224 K/uL  Mean Cell Volume : 84.9 fl  Mean Cell Hemoglobin : 25.8 pg  Mean Cell Hemoglobin Concentration : 30.3 gm/dL  Auto Neutrophil # : x  Auto Lymphocyte # : x  Auto Monocyte # : x  Auto Eosinophil # : x  Auto Basophil # : x  Auto Neutrophil % : x  Auto Lymphocyte % : x  Auto Monocyte % : x  Auto Eosinophil % : x  Auto Basophil % : x      12-11    137  |  103  |  37<H>  ----------------------------<  175<H>  3.6   |  24  |  1.22    Ca    8.3<L>      11 Dec 2021 09:40  Phos  2.7     12-10  Mg     3.4     12-10    TPro  7.0  /  Alb  2.0<L>  /  TBili  0.5  /  DBili  x   /  AST  28  /  ALT  32  /  AlkPhos  221<H>  12-11    Hemoglobin A1C:     LIVER FUNCTIONS - ( 11 Dec 2021 09:40 )  Alb: 2.0 g/dL / Pro: 7.0 g/dL / ALK PHOS: 221 U/L / ALT: 32 U/L DA / AST: 28 U/L / GGT: x           Vitamin B12         RADIOLOGY      ANALYSIS AND PLAN:  This is a 76-year-old with episodes of altered mental status and a history of shaking.  For altered mental status, metabolic encephalopathy secondary to underlying infectious type process and possibly underlying pneumonia.  For episodes of abnormal movements, as per my conversation with the spouse in the past, these were nonepileptic in nature.  As per my conversation with him in the past, does not want any antiseizure medications.  The patient was tried before in the past on muscle relaxants to see if that will help with tone, with no significant changes.  For history of diabetes, strict control of blood sugars.  events noted     The spouse's name is Marciano, his telephone number is [phone] 12/10    Greater than 40 minutes of time was spent with the patient, plan of care, reviewing data, and speaking to the multidisciplinary healthcare team with greater than 50% of that time in counseling and care coordination.    Thank you for the courtesy of this consultation.

## 2021-12-11 NOTE — PROGRESS NOTE ADULT - ASSESSMENT
The patient is a 76 year old female with a history of HTN, DM, CVA, dementia, chronic respiratory failure s/p trach, PEG, cardiac arrest who presents from NH with anemia.    Plan:  - Recent echo with normal LV systolic function, mild pulm HTN  - Hemoglobin improved with transfusion  - Hold aspirin  - On zosyn  - Mechanical ventilation  - Overall poor prognosis. Comfort care would be most appropriate.

## 2021-12-11 NOTE — PROGRESS NOTE ADULT - SUBJECTIVE AND OBJECTIVE BOX
Patient is a 76y Female whom presented to the hospital with ckd and manasa     PAST MEDICAL & SURGICAL HISTORY:  Dementia of frontal lobe type    Aphasic stroke    Diabetes mellitus    Respiratory failure    Hypertension    GERD (gastroesophageal reflux disease)    Constipation    Respiratory failure    CVA (cerebral vascular accident)    HTN (hypertension)    DM (diabetes mellitus)    Advanced dementia    COVID-19 virus detected    Quadriplegia    Pneumonia    Type II diabetes mellitus    Hx of appendectomy    Gastrostomy in place    Tracheostomy in place    Tracheostomy tube present    Feeding by G-tube        MEDICATIONS  (STANDING):  ALBUTerol    90 MICROgram(s) HFA Inhaler 2 Puff(s) Inhalation every 6 hours  chlorhexidine 0.12% Liquid 15 milliLiter(s) Oral Mucosa every 12 hours  dextrose 40% Gel 15 Gram(s) Oral once  dextrose 5%. 1000 milliLiter(s) (50 mL/Hr) IV Continuous <Continuous>  dextrose 5%. 1000 milliLiter(s) (100 mL/Hr) IV Continuous <Continuous>  dextrose 50% Injectable 25 Gram(s) IV Push once  dextrose 50% Injectable 12.5 Gram(s) IV Push once  dextrose 50% Injectable 25 Gram(s) IV Push once  glucagon  Injectable 1 milliGRAM(s) IntraMuscular once  insulin lispro (ADMELOG) corrective regimen sliding scale   SubCutaneous every 6 hours  lactated ringers. 1000 milliLiter(s) (100 mL/Hr) IV Continuous <Continuous>  methocarbamol 250 milliGRAM(s) Oral two times a day  pantoprazole  Injectable 40 milliGRAM(s) IV Push every 12 hours  piperacillin/tazobactam IVPB.. 3.375 Gram(s) IV Intermittent every 8 hours  polyethylene glycol 3350 17 Gram(s) Oral every 12 hours  senna 2 Tablet(s) Oral at bedtime  simethicone 80 milliGRAM(s) Chew every 6 hours  sucralfate 1 Gram(s) Oral every 6 hours  tiotropium 18 MICROgram(s) Capsule 1 Capsule(s) Inhalation daily  vancomycin  IVPB 750 milliGRAM(s) IV Intermittent every 12 hours      Allergies    codeine (Hives)    Intolerances        SOCIAL HISTORY:  Denies ETOh,Smoking,     FAMILY HISTORY:  No pertinent family history in first degree relatives        REVIEW OF SYSTEMS:    unable to obtained a good review system                                                                        9.4    6.98  )-----------( 224      ( 11 Dec 2021 09:39 )             31.0       CBC Full  -  ( 11 Dec 2021 09:39 )  WBC Count : 6.98 K/uL  RBC Count : 3.65 M/uL  Hemoglobin : 9.4 g/dL  Hematocrit : 31.0 %  Platelet Count - Automated : 224 K/uL  Mean Cell Volume : 84.9 fl  Mean Cell Hemoglobin : 25.8 pg  Mean Cell Hemoglobin Concentration : 30.3 gm/dL  Auto Neutrophil # : x  Auto Lymphocyte # : x  Auto Monocyte # : x  Auto Eosinophil # : x  Auto Basophil # : x  Auto Neutrophil % : x  Auto Lymphocyte % : x  Auto Monocyte % : x  Auto Eosinophil % : x  Auto Basophil % : x      12-11    137  |  103  |  37<H>  ----------------------------<  175<H>  3.6   |  24  |  1.22    Ca    8.3<L>      11 Dec 2021 09:40  Phos  2.7     12-10  Mg     3.4     12-10    TPro  7.0  /  Alb  2.0<L>  /  TBili  0.5  /  DBili  x   /  AST  28  /  ALT  32  /  AlkPhos  221<H>  12-11      CAPILLARY BLOOD GLUCOSE      POCT Blood Glucose.: 173 mg/dL (11 Dec 2021 05:10)  POCT Blood Glucose.: 170 mg/dL (10 Dec 2021 23:09)  POCT Blood Glucose.: 185 mg/dL (10 Dec 2021 18:50)  POCT Blood Glucose.: 174 mg/dL (10 Dec 2021 12:12)      Vital Signs Last 24 Hrs  T(C): 37.2 (11 Dec 2021 08:23), Max: 37.2 (11 Dec 2021 08:23)  T(F): 99 (11 Dec 2021 08:23), Max: 99 (11 Dec 2021 08:23)  HR: 106 (11 Dec 2021 07:59) (63 - 106)  BP: 154/60 (11 Dec 2021 07:59) (85/39 - 154/60)  BP(mean): 89 (11 Dec 2021 07:59) (54 - 89)  RR: 29 (11 Dec 2021 07:59) (15 - 32)  SpO2: 97% (11 Dec 2021 07:59) (95% - 100%)                 PHYSICAL EXAM:    Constitutional: NAD  HEENT: conjunctive   clear   Neck:  No JVD  Respiratory: pos decrease bs pos trach  Cardiovascular: S1 and S2  Gastrointestinal: BS+, soft, pos peg   Extremities: No peripheral edema

## 2021-12-11 NOTE — PROGRESS NOTE ADULT - SUBJECTIVE AND OBJECTIVE BOX
Patient is a 76y old  Female who presents with a chief complaint of Anemia (10 Dec 2021 16:28)    BRIEF HOSPITAL COURSE:   77 y/o F w/ pmh of htn, dm, CVA, dementia, recent hx of cardiac arrest at [facility], chronic resp failure s/p trach/peg presented to [facility] on 12/08/2021 from SNF for abnormal labs. Pt in the ED pt was found to be anemic, RYAN and Hyponatermia, pt was given 2U of PRBC and h/h improved.    Events last 24 hours:   -Remains on ventilatory support.  -Afebrile, hemodynamically stable.  -Hgb stable.     PAST MEDICAL & SURGICAL HISTORY:  Dementia of frontal lobe type  Aphasic stroke  Diabetes mellitus  Respiratory failure  Hypertension  GERD (gastroesophageal reflux disease)  Constipation  Respiratory failure  CVA (cerebral vascular accident)  HTN (hypertension)  DM (diabetes mellitus)  Advanced dementia  COVID-19 virus detected  Quadriplegia  Pneumonia  Type II diabetes mellitus  Hx of appendectomy  Gastrostomy in place  Tracheostomy in place  Tracheostomy tube present  Feeding by G-tube    Review of Systems:  Due to pt's mental status, subjective information was not able to be obtained from the patient. History was obtained, to the extent possible, from review of the chart and collateral sources of information.     Medications:  piperacillin/tazobactam IVPB.. 3.375 Gram(s) IV Intermittent every 8 hours  midodrine 5 milliGRAM(s) Oral every 8 hours  ALBUTerol    90 MICROgram(s) HFA Inhaler 2 Puff(s) Inhalation every 6 hours  acetaminophen     Tablet .. 650 milliGRAM(s Oral every 6 hours PRN  LORazepam     Tablet 1 milliGRAM(s) Oral every 8 hours PRN  methocarbamol 250 milliGRAM(s) Oral two times a day  heparin   Injectable 5000 Unit(s) SubCutaneous every 12 hours  pantoprazole  Injectable 40 milliGRAM(s) IV Push every 12 hours  polyethylene glycol 3350 17 Gram(s) Oral every 12 hours  senna 2 Tablet(s) Oral at bedtime  simethicone 80 milliGRAM(s) Chew every 6 hours  sucralfate suspension 1 Gram(s) Enteral Tube every 6 hours  dextrose 40% Gel 15 Gram(s) Oral once  dextrose 50% Injectable 25 Gram(s) IV Push once  dextrose 50% Injectable 12.5 Gram(s) IV Push once  dextrose 50% Injectable 25 Gram(s) IV Push once  glucagon  Injectable 1 milliGRAM(s) IntraMuscular once  insulin lispro (ADMELOG) corrective regimen sliding scale   SubCutaneous every 6 hours  dextrose 5%. 1000 milliLiter(s) IV Continuous <Continuous>  dextrose 5%. 1000 milliLiter(s) IV Continuous <Continuous>  sodium chloride 0.9%. 1000 milliLiter(s) IV Continuous <Continuous>  chlorhexidine 0.12% Liquid 15 milliLiter(s) Oral Mucosa every 12 hours    Mode: AC/ CMV (Assist Control/ Continuous Mandatory Ventilation)  RR (machine): 18  TV (machine): 400  FiO2: 100  PEEP: 5  PS:   ITime: 1  MAP: 10  PIP: 33    ICU Vital Signs Last 24 Hrs  T(C): 36.8 (10 Dec 2021 23:48), Max: 37 (10 Dec 2021 08:31)  T(F): 98.3 (10 Dec 2021 23:48), Max: 98.6 (10 Dec 2021 08:31)  HR: 69 (10 Dec 2021 22:00) (63 - 121)  BP: 114/59 (10 Dec 2021 22:00) (85/39 - 133/61)  BP(mean): 76 (10 Dec 2021 22:00) (54 - 82)  ABP: --  ABP(mean): --  RR: 19 (10 Dec 2021 22:00) (15 - 35)  SpO2: 100% (10 Dec 2021 22:00) (88% - 100%)    I&O's Detail  09 Dec 2021 07:01  -  10 Dec 2021 07:00  --------------------------------------------------------  IN:    IV PiggyBack: 100 mL    sodium chloride 0.9%: 900 mL  Total IN: 1000 mL  OUT:    Indwelling Catheter - Urethral (mL): 1200 mL  Total OUT: 1200 mL  Total NET: -200 mL    10 Dec 2021 07:01  -  11 Dec 2021 00:01  --------------------------------------------------------  IN:    Enteral Tube Flush: 570 mL    Free Water: 180 mL    Glucerna 1.5: 250 mL    IV PiggyBack: 100 mL    sodium chloride 0.9%: 450 mL    sodium chloride 0.9%: 715 mL  Total IN: 2265 mL  OUT:    Indwelling Catheter - Urethral (mL): 460 mL  Total OUT: 460 mL  Total NET: 1805 mL    LABS:             9.3    13.27 )-----------( 232      ( 10 Dec 2021 08:38 )             29.9   12-10  136  |  100  |  49<H>  ----------------------------<  235<H>  3.9   |  26  |  1.47<H>  Ca    8.6      10 Dec 2021 08:38  Phos  2.7     12-10  Mg     3.4     12-10  TPro  7.0  /  Alb  2.1<L>  /  TBili  0.8  /  DBili  x   /  AST  42<H>  /  ALT  42  /  AlkPhos  245<H>  12-10    CAPILLARY BLOOD GLUCOSE  POCT Blood Glucose.: 170 mg/dL (10 Dec 2021 23:09)    PT/INR - ( 09 Dec 2021 07:09 )   PT: 13.2 sec;   INR: 1.10 ratio      CULTURES:  Culture Results:   <10,000 CFU/mL Normal Urogenital Elvira (12-08-21 @ 17:55)  Culture Results:   No growth to date. (12-08-21 @ 15:24)  Culture Results:   No growth to date. (12-08-21 @ 15:24)  Rapid RVP Result: NotDetec (12-08-21 @ 11:59)      Physical Examination:  General: Elderly female.  HEENT: PERRL.  PULM: b/l BS.  CVS: s1/s2.  ABD: Soft, nondistended, nontender, normoactive bowel sounds.  EXT: No edema, nontender. Contracted.  SKIN: Warm.    RADIOLOGY:   < from: CT Abdomen and Pelvis No Cont (12.08.21 @ 16:52) >  CONTRAST/COMPLICATIONS:  IV Contrast: NONE  Oral Contrast: NONE  Complications: None reported at time of study completion  PROCEDURE:  CT of the Chest, Abdomen and Pelvis was performed.  Sagittal and coronal reformats were performed.  FINDINGS:  CHEST:  LUNGS AND LARGE AIRWAYS: Tracheostomy terminates above the parmjit. There   are multifocal lung parenchymal opacities most prominent in dependent   locations, concerning for multifocal infection superimposed ondistal   airways impaction and atelectasis. Most of the opacities are either new   or unchanged since prior study. However, volume loss of the right lower   lobe has improved since 11/7/2021. Redemonstrated calcified material   again likely reflects aspirated contents.  PLEURA: Trace right and small left pleural effusions. No pneumothorax.  VESSELS: Normal caliber of the thoracic aorta with atheromatous change.   Main pulmonary artery size is within normal limits.  HEART: Heart size is qualitatively normal. Small pericardial fluid is   similar to prior. There is coronary artery calcification and mitral   calcification. Low attenuation of the blood pool of the heart may reflect   anemia.  MEDIASTINUM AND JHONNY: Prominent/mildly enlarged mediastinal and hilar   nodes are redemonstrated. The esophagus is distended with air probable   left-sided diverticulum at the thyroid level, image 15 series 4, similar   to prior.  CHEST WALL AND LOWER NECK: Symmetric appearance of the chest wall   musculature, without evidence of hematoma. Soft tissue edema is noted.  ABDOMEN AND PELVIS:  Solid ja evaluation limited due to noncontrast technique.  LIVER: Enlarged liver measuring 21 cm in craniocaudal dimension.  BILE DUCTS: No biliary distention.  GALLBLADDER: Cholelithiasis.  SPLEEN: Borderline enlarged measuring 13 cm in craniocaudal dimension.  PANCREAS: No main pancreatic ductal dilatation.  ADRENALS: Unchanged adrenal gland thickening.  KIDNEYS/URETERS: No hydronephrosis. Nonspecific bilateral perinephric   stranding.  BLADDER: Woody catheter. Urinary bladder is underdistended, suboptimally   characterized.  REPRODUCTIVE ORGANS: Uterus and adnexa suboptimally characterized on   noncontrast CT.  BOWEL: Gastrostomy tube terminates in the stomach. Oral contrast seen   within the mildly distended stomach and along small and large bowel   loops. No significant small bowel distention. Rectum is distended with   air and fluid, similar to prior.  PERITONEUM: No ascites, free air, or loculated fluid collection.  VESSELS: No aneurysm of the abdominal aorta. Aortoiliac and proximal   femoral atheromatous change.  RETROPERITONEUM/LYMPH NODES: Small volume nodes.  ABDOMINAL WALL: Symmetric appearance of the abdominal wall and   retroperitoneal musculature. Mild soft tissue edema.  BONES: Chronic fracture deformity of the partially imaged left femur,   with surrounding callus, redemonstrated. Diffuse osseous demineralization   and multilevel degenerative changes limits evaluation of the bones.   Central canal and neural foramina are not adequately assessed on this   study. Fixation hardware of the distal left radius limited in evaluation   due to positioning.  IMPRESSION:  Limited study without IV contrast.  CHEST:  1.Tracheostomy. Distal airways impaction and multifocal pneumonia,   possibly related to aspiration. Most of the opacities are either new or   unchanged since prior study. However, volume loss of the right lower lobe   has improved since 11/7/2021. Small left pleural effusion and trace right   pleural effusion, similar to prior. Follow to resolution with repeat   chest CT in 4 weeks.  2. Esophagus is distended with air probable left-sided diverticulum at   the thyroid level, image 15 series 4, similar to prior.  3. Coronary artery calcification and mitral calcification. Low   attenuation of the blood pool of the heart may reflect anemia.  ABDOMEN/PELVIS:  1. Mild chest and abdominal wall soft tissue edema. Symmetric appearance   of the abdominal wall and retroperitoneal musculature, without evidence   of hematoma. Correlate with clinical status of the patient, serial   hemoglobin and hematocrit to determine need for follow-up postcontrast   imaging.  2. No hydronephrosis of the kidneys.    77 y/o F w/ pmh of htn, dm, CVA, dementia, recent hx of cardiac arrest at [facility], chronic resp failure s/p trach/peg presented to [facility] on 12/08/2021 from SNF for abnormal labs admitted w/:  1. Chronic respiratory failure   2. ABLA  3. ARF    Plan:  NEURO:  -Ativan IVP PRN for agitation.     CV:  -HD stable, maintain goal MAP >65-70. Midodrine.  -Keep K~4 and Mg>2 for optimal arrhythmia suppression     RESP:  -Remains on ventilatory support (40%/+5), SBT trial in AM.   -Albuterol q6h.     RENAL:  -Renal function currently w/ RYAN, continues to improve.  -trend lytes/Scr daily with BMP  -I's and O's, goal UOP 0.5 cc/kg/hr  -renal dose meds and avoid nephrotoxins     GI:  -TF.   -Protonix BID.    ENDO:  -Aggressive glycemic control to limit FS glucose to <180mg/dl. ISS.    ID:  -All Cx NGTD. Empiric Zosyn.     HEME:  -DVT ppx w/ SC Heparin.   -Carafate.     TIME SPENT: 36 minutes  Evaluating/treating patient, reviewing data/labs/imaging, discussing case with multidisciplinary team, discussing plan/goals of care with patient/family. Non-inclusive of procedure time.

## 2021-12-11 NOTE — PROGRESS NOTE ADULT - SUBJECTIVE AND OBJECTIVE BOX
Date/Time Patient Seen:  		  Referring MD:   Data Reviewed	       Patient is a 76y old  Female who presents with a chief complaint of Anemia (11 Dec 2021 00:01)      Subjective/HPI     PAST MEDICAL & SURGICAL HISTORY:  Dementia of frontal lobe type    Aphasic stroke    Diabetes mellitus    Respiratory failure    Hypertension    GERD (gastroesophageal reflux disease)    Constipation    Respiratory failure    CVA (cerebral vascular accident)    HTN (hypertension)    DM (diabetes mellitus)    Advanced dementia    COVID-19 virus detected    Quadriplegia    Pneumonia    Type II diabetes mellitus    Hx of appendectomy    Gastrostomy in place    Tracheostomy in place    Tracheostomy tube present    Feeding by G-tube          Medication list         MEDICATIONS  (STANDING):  ALBUTerol    90 MICROgram(s) HFA Inhaler 2 Puff(s) Inhalation every 6 hours  chlorhexidine 0.12% Liquid 15 milliLiter(s) Oral Mucosa every 12 hours  dextrose 40% Gel 15 Gram(s) Oral once  dextrose 5%. 1000 milliLiter(s) (50 mL/Hr) IV Continuous <Continuous>  dextrose 5%. 1000 milliLiter(s) (100 mL/Hr) IV Continuous <Continuous>  dextrose 50% Injectable 25 Gram(s) IV Push once  dextrose 50% Injectable 12.5 Gram(s) IV Push once  dextrose 50% Injectable 25 Gram(s) IV Push once  glucagon  Injectable 1 milliGRAM(s) IntraMuscular once  heparin   Injectable 5000 Unit(s) SubCutaneous every 12 hours  insulin lispro (ADMELOG) corrective regimen sliding scale   SubCutaneous every 6 hours  LORazepam   Injectable 1 milliGRAM(s) IV Push once  methocarbamol 250 milliGRAM(s) Oral two times a day  midodrine 5 milliGRAM(s) Oral every 8 hours  pantoprazole  Injectable 40 milliGRAM(s) IV Push every 12 hours  piperacillin/tazobactam IVPB.. 3.375 Gram(s) IV Intermittent every 8 hours  polyethylene glycol 3350 17 Gram(s) Oral every 12 hours  senna 2 Tablet(s) Oral at bedtime  simethicone 80 milliGRAM(s) Chew every 6 hours  sodium chloride 0.9%. 1000 milliLiter(s) (65 mL/Hr) IV Continuous <Continuous>  sucralfate suspension 1 Gram(s) Enteral Tube every 6 hours    MEDICATIONS  (PRN):  acetaminophen     Tablet .. 650 milliGRAM(s) Oral every 6 hours PRN Temp greater or equal to 38C (100.4F), Mild Pain (1 - 3)  LORazepam     Tablet 1 milliGRAM(s) Oral every 8 hours PRN Agitation         Vitals log        ICU Vital Signs Last 24 Hrs  T(C): 36.8 (11 Dec 2021 04:01), Max: 37 (10 Dec 2021 08:31)  T(F): 98.2 (11 Dec 2021 04:01), Max: 98.6 (10 Dec 2021 08:31)  HR: 106 (11 Dec 2021 07:59) (63 - 106)  BP: 154/60 (11 Dec 2021 07:59) (85/39 - 154/60)  BP(mean): 89 (11 Dec 2021 07:59) (54 - 89)  ABP: --  ABP(mean): --  RR: 29 (11 Dec 2021 07:59) (15 - 32)  SpO2: 97% (11 Dec 2021 07:59) (92% - 100%)       Mode: AC/ CMV (Assist Control/ Continuous Mandatory Ventilation)  RR (machine): 18  TV (machine): 400  FiO2: 40  PEEP: 5  ITime: 1  MAP: 11  PIP: 32      Input and Output:  I&O's Detail    10 Dec 2021 07:01  -  11 Dec 2021 07:00  --------------------------------------------------------  IN:    Enteral Tube Flush: 770 mL    Free Water: 180 mL    Glucerna 1.5: 600 mL    IV PiggyBack: 100 mL    sodium chloride 0.9%: 450 mL    sodium chloride 0.9%: 1170 mL  Total IN: 3270 mL    OUT:    Indwelling Catheter - Urethral (mL): 460 mL    Voided (mL): 200 mL  Total OUT: 660 mL    Total NET: 2610 mL          Lab Data                        9.3    13.27 )-----------( 232      ( 10 Dec 2021 08:38 )             29.9     12-10    136  |  100  |  49<H>  ----------------------------<  235<H>  3.9   |  26  |  1.47<H>    Ca    8.6      10 Dec 2021 08:38  Phos  2.7     12-10  Mg     3.4     12-10    TPro  7.0  /  Alb  2.1<L>  /  TBili  0.8  /  DBili  x   /  AST  42<H>  /  ALT  42  /  AlkPhos  245<H>  12-10            Review of Systems	      Objective     Physical Examination    heart s1s2  lung dec BS  abd soft      Pertinent Lab findings & Imaging      Annalise:  NO   Adequate UO     I&O's Detail    10 Dec 2021 07:01  -  11 Dec 2021 07:00  --------------------------------------------------------  IN:    Enteral Tube Flush: 770 mL    Free Water: 180 mL    Glucerna 1.5: 600 mL    IV PiggyBack: 100 mL    sodium chloride 0.9%: 450 mL    sodium chloride 0.9%: 1170 mL  Total IN: 3270 mL    OUT:    Indwelling Catheter - Urethral (mL): 460 mL    Voided (mL): 200 mL  Total OUT: 660 mL    Total NET: 2610 mL               Discussed with:     Cultures:	        Radiology

## 2021-12-11 NOTE — PROGRESS NOTE ADULT - SUBJECTIVE AND OBJECTIVE BOX
Patient is a 76y old  Female who presents with a chief complaint of Anemia (11 Dec 2021 10:09)      INTERVAL HPI/OVERNIGHT EVENTS: Patient seen and examined at bedside. No overnight events.      MEDICATIONS  (STANDING):  ALBUTerol    90 MICROgram(s) HFA Inhaler 2 Puff(s) Inhalation every 6 hours  chlorhexidine 0.12% Liquid 15 milliLiter(s) Oral Mucosa every 12 hours  dextrose 40% Gel 15 Gram(s) Oral once  dextrose 5%. 1000 milliLiter(s) (50 mL/Hr) IV Continuous <Continuous>  dextrose 5%. 1000 milliLiter(s) (100 mL/Hr) IV Continuous <Continuous>  dextrose 50% Injectable 25 Gram(s) IV Push once  dextrose 50% Injectable 12.5 Gram(s) IV Push once  dextrose 50% Injectable 25 Gram(s) IV Push once  glucagon  Injectable 1 milliGRAM(s) IntraMuscular once  heparin   Injectable 5000 Unit(s) SubCutaneous every 12 hours  insulin lispro (ADMELOG) corrective regimen sliding scale   SubCutaneous every 6 hours  methocarbamol 250 milliGRAM(s) Oral two times a day  midodrine 5 milliGRAM(s) Oral every 8 hours  pantoprazole  Injectable 40 milliGRAM(s) IV Push every 12 hours  piperacillin/tazobactam IVPB.. 3.375 Gram(s) IV Intermittent every 8 hours  polyethylene glycol 3350 17 Gram(s) Oral every 12 hours  senna 2 Tablet(s) Oral at bedtime  simethicone 80 milliGRAM(s) Chew every 6 hours  sodium chloride 0.9%. 1000 milliLiter(s) (65 mL/Hr) IV Continuous <Continuous>  sucralfate suspension 1 Gram(s) Enteral Tube every 6 hours    MEDICATIONS  (PRN):  acetaminophen     Tablet .. 650 milliGRAM(s) Oral every 6 hours PRN Temp greater or equal to 38C (100.4F), Mild Pain (1 - 3)  LORazepam     Tablet 1 milliGRAM(s) Oral every 8 hours PRN Agitation      Allergies    codeine (Hives)    Intolerances        REVIEW OF SYSTEMS:  Unable to obtain meaningful ROS due to mental status      Vital Signs Last 24 Hrs  T(C): 37.2 (11 Dec 2021 08:23), Max: 37.2 (11 Dec 2021 08:23)  T(F): 99 (11 Dec 2021 08:23), Max: 99 (11 Dec 2021 08:23)  HR: 106 (11 Dec 2021 07:59) (63 - 106)  BP: 154/60 (11 Dec 2021 07:59) (85/39 - 154/60)  BP(mean): 89 (11 Dec 2021 07:59) (54 - 89)  RR: 29 (11 Dec 2021 07:59) (15 - 32)  SpO2: 97% (11 Dec 2021 07:59) (95% - 100%)    PHYSICAL EXAM:  GENERAL: chronically ill appearing, emaciated, NAD, obtunded  HEAD:  atraumatic  EYES: conjunctiva clear  NECK: +trach in place, clean  RESPIRATORY:  coarse mechanical breath sounds, no gross crackles or rales  CARDIOVASCULAR:  regular rate and rhythm, no murmurs or rubs or gallops, 2+ peripheral pulses  GASTROINTESTINAL:  soft, +PEG in place, clean and dry as well, nondistended, no hepatosplenomegaly palpated, bowel sounds present  EXTREMITIES: no clubbing or cyanosis or edema  MUSCULOSKELETAL:  +diffuse contractures in all joints  NERVOUS SYSTEM: does not respond to pain  SKIN: necrotic tips of bilateral 1st toes    LABS:                        9.4    6.98  )-----------( 224      ( 11 Dec 2021 09:39 )             31.0     CBC Full  -  ( 11 Dec 2021 09:39 )  WBC Count : 6.98 K/uL  Hemoglobin : 9.4 g/dL  Hematocrit : 31.0 %  Platelet Count - Automated : 224 K/uL  Mean Cell Volume : 84.9 fl  Mean Cell Hemoglobin : 25.8 pg  Mean Cell Hemoglobin Concentration : 30.3 gm/dL  Auto Neutrophil # : x  Auto Lymphocyte # : x  Auto Monocyte # : x  Auto Eosinophil # : x  Auto Basophil # : x  Auto Neutrophil % : x  Auto Lymphocyte % : x  Auto Monocyte % : x  Auto Eosinophil % : x  Auto Basophil % : x    11 Dec 2021 09:40    137    |  103    |  37     ----------------------------<  175    3.6     |  24     |  1.22     Ca    8.3        11 Dec 2021 09:40    TPro  7.0    /  Alb  2.0    /  TBili  0.5    /  DBili  x      /  AST  28     /  ALT  32     /  AlkPhos  221    11 Dec 2021 09:40        CAPILLARY BLOOD GLUCOSE      POCT Blood Glucose.: 173 mg/dL (11 Dec 2021 05:10)  POCT Blood Glucose.: 170 mg/dL (10 Dec 2021 23:09)  POCT Blood Glucose.: 185 mg/dL (10 Dec 2021 18:50)  POCT Blood Glucose.: 174 mg/dL (10 Dec 2021 12:12)        Culture - Sputum (collected 12-11-21 @ 00:41)  Source: .Sputum Sputum  Gram Stain (12-11-21 @ 04:56):    Moderate polymorphonuclear leukocytes per low power field    Rare Squamous epithelial cells per low power field    Numerous Gram Negative Rods seen per oil power field    Culture - Urine (collected 12-08-21 @ 17:55)  Source: Clean Catch Clean Catch (Midstream)  Final Report (12-09-21 @ 13:58):    <10,000 CFU/mL Normal Urogenital Elvira    Culture - Blood (collected 12-08-21 @ 15:24)  Source: .Blood Blood-Peripheral  Preliminary Report (12-09-21 @ 16:01):    No growth to date.    Culture - Blood (collected 12-08-21 @ 15:24)  Source: .Blood Blood-Peripheral  Preliminary Report (12-09-21 @ 16:01):    No growth to date.        RADIOLOGY & ADDITIONAL TESTS:     Consultant(s) Notes Reviewed:  [x] YES  [ ] NO    Care Discussed with [x] Consultants  [x] Patient  [ ] Family  [ ]      [ x] Other; RN  DVT ppx

## 2021-12-11 NOTE — PROGRESS NOTE ADULT - ASSESSMENT
75F with chronic resp failure - vent dependent, hx of subarachnoid hemorrhage, hx of cardiac arrest, dementia s/p PEG, HTN, DM2 on insulin, hx of CVA, GERD, COPD, admission here from 9/25 to 10/4 and 11/7-11/11 for respiratory failure/sepsis possibly 2/2 aspiration vs vent associated PNA who presents from [facility] for abnormal labs.  Patient does not contribute to history. In the ED, patient's initial triage vitals were BP 88/37, HR 81, RR 18, 100% on vent and T 99 F.  Labs showed leukocytosis of 12.18, HGB 5.2, BUN/Cr 89/2.00. CXR: Tracheostomy cannula reidentified in position. No change heart mediastinum. Widespread bilateral airspace disease slightly improved.  correlate for pulmonary edema and/or infection. No significant pleural effusion. No pneumothorax. Patient's hand projects over portion of the right base. CT chest and A/P pending  (08 Dec 2021 15:50)      ACUTE RENAL FAILURE: sodium chloride 0.9%. 1000 milliLiter(s) (65 mL/Hr  Serum creatinine is improving    There is no progression . No uremic symptoms  No evidence of anemia .  Fluid status stable.  Will continue to avoid nephrotoxic drugs.  Patient remains asymptomatic.   Continue current therapy.    f/u blood and urine cx,serial lactate levels,monitor vitals roddy raymundo hydration,monitor urine output and renal profile,iv abx as per id cons      hypotension midodrine 5 milliGRAM(s) Oral every 8 hours

## 2021-12-11 NOTE — PROGRESS NOTE ADULT - ASSESSMENT
CHAPINCITO GUIDRY 75 f [facility] S 12/8/2021   DR ILIANA KEMP     REVIEW OF SYMPTOMS      Able to give (reliable) ROS  NO     PHYSICAL EXAM    HEENT Unremarkable  atraumatic   RESP Fair air entry EXP prolonged    Harsh breath sound Resp distres mild   CARDIAC S1 S2 No S3     NO JVD    ABDOMEN SOFT BS PRESENT NOT DISTENDED No hepatosplenomegaly   PEDAL EDEMA present No calf tenderness  NO rash     ________________  Age DOA CC.  76 year old female with a history of Pneumonia  HTN, DM, CVA, dementia, chronic respiratory failure s/p trach, PEG, cardiac arrest who presented 12/8/2021 from NH with abnormal labs. She was found to have a hemoglobin of 6. No further history could be obtained from patient.  Pulm crit care consulted 12/8/2021   ________  PROBLEM LIST.  PNEUMONIA poa 12/8  VENT MANAGEMENT. VDRF poa   COPD.  HYPOTENSION 12/8  GI BLEED poa 12/8  SEVERE ANEMIA poa 12/8 Hb 5.2  TRANSFUSION 2 U 12/8  HYPONATREMIA poa. 12/8/2021 Na 127   RYAN poa. 12/8/2021 Cr 2   REID Placed 12/8 for io monitoring  NO IV ACCESS 12/10/2021     _____                            GOC.12/8/2021 Full code   COVID STATUS. 12/8/2021 scv2 (-)   ICU STAY. Admitted ICU 12/8/2021   ABIO.   12/8 zosyn x 7d Pneum        PATIENT DATA   VITALS/PO/IO/VENT/DRIPS.    12/11/2021 99f 75 100/50   12/11/2021 ac 18/400/5/.4       BEST PRACTICE ISSUES.                                                  HEAD OF BED ELEVATION. Yes  DVT PROPHYLAXIS.      12/8/2021 No pharmac pplx as pt possibly has abla on admission 12/8/2021                                    SQUIRES PROPHYLAXIS.      12/8/2021 IV protonix 40.2       12/9/2021 carafate 1.4                                                  DIET.            12/8/2021 npo till gi bleed ruled out              INFECTION PROPHYLAXIS.    12/8/2021 chlorhexidine 0.12% bid     GENERAL ISSUES                                       ALLERGY.         codeine                   WEIGHT.           12/8/2021 61                           BMI.                   12/8/2021 22  SPEECH SWALLOW RECOMMENDATIONS.   IV FLUIDS.   12/10 ns 65 x 2 d      ASSESSMENT/RECOMMENDATIONS.    RESP.   Trach  SP Trach pmh     VENT MANAGEMENT.  VENT DEPENDENT RESP FAILURE pmh.  Clinically stable on current settings   12/10 dw  He wants us to try weaning  12/11 will try simv in am     COPD pmh.   12/9/2021 albuterol hf.4   12/9/2021 Spiriva   Cont Rx      HEMODYNAMICS.   HYPOTENSION poa.   12/8/2021 11a 2l ns given in er   9/8/2021 ECHO n lvsf mild dd pasp 51   12/9/2021 Midodrine 5.3   BP has improvd   target map 65 (+)       INFECTION.   Pneumonia  poa 12/8/2021   w 12/9-12/11/2021 w 8.8 - 6.9   12/9 pr 6.7   12/8 urine c (-)   CXR 12/8 widespread airspace dis sl improvd cw 11/9/2021 12/8 ct cap distal airway impaction and mf pneum related to aspirat   Changes are either new or unchanged   Volume loss rll has improvd since 11/7/2021   Small l effsn   12/8 mrsa (-)   12/8 blod c (-)  12/8 urine c (-)   12/8 Zosyn x 7d     SEVERE ANEMIA poa   Hb 12/8-12/8-12/9-12/11/2021   Hb 5.2 -9.7-8.6 - 9.4  12/8/2021 Hb 5.2   12/8 ct cap no rp bleed No hydronephrosis   12/8/2021 2 u prbc ordered in er   Monitor Hb serially   Target Hb 7 (+)    GERD pmh.  On ppi     sp PEG pmh    GI BLEED poa 12/8 12/9/2021 SOB (+)   12/9/2021 Seen by Dr Nieves No intervention pplannd   12/8/2021 IV protonix 40.2       12/9/2021 carafate 1.4                12/9/2021 Dr BETH vinesed dvt pplx hpsc   12/11/2021 gi not planning any pproced      HYPONATREMIA poa.   12/8-12/9-12/11/2021 Na 127 -131 -137  Improving     RYAN poa.   12/8-12/9-12/11/2021 Cr 2-1.5 -1.2  12/10 ns 65 x 2 d   Improving    SEIZURES pmh   Takes lorazepam 1.3         OVERALL ASSESSMENT/PLAN  Anoxic encephalopathy vdrf peg trach patient age 76 full code  ANEMIA Monitor Hb   PNEUM Follow cult  HYPONA Monitor   POOR IV ACCESS 12/10/2021 For midline       TIME SPENT   Over 36 minutes aggregate critical care time spent on encounter; activities included   direct patient care, counseling and/or coordinating care reviewing notes, lab data/ imaging , discussion with multidisciplinary team/ patient  /family and explaining in detail risks, benefits, alternatives  of the recommendations     CHAPINCITO GUIDRY 75 [address] [facility] S 12/8/2021   DR ILIANA KEMP

## 2021-12-11 NOTE — PROGRESS NOTE ADULT - ASSESSMENT
75F with chronic resp failure - vent dependent, hx of subarachnoid hemorrhage, hx of cardiac arrest, dementia s/p PEG, HTN, DM2 on insulin, hx of CVA, GERD, COPD, admission here from 9/25 to 10/4 and 11/7-11/11 for respiratory failure/sepsis possibly 2/2 aspiration vs vent associated PNA who presents from [facility] for abnormal labs.    # RYAN   Improving   Baseline creatinine .9  Avoid nephrotoxic meds  gentle hydration with ns   repeat BMP in AM  Strict I&O's  Nephrology following     #Anemia   HGB improving   Has hx of anemia on previous hospitalizations  Occult positive    GI consulted Dr. Nieves, no plans for scope  S/p 2 units PRBC on admission    demanding endoscopy and not realistic about expectations despite multiple conversations with myself and Dr. Nieves.   Alternate opinion from GI Dr. Dupree appreciated. No plan for endoscopy     #Hyponatremia  Gentle hydration with ns  improving  trend bmp     # aspiration PNA  - Sepsis poa  - On empiric abx with Zosyn, vanc dc'd   - ID following, reccs appreciated   - Follow blood and urine cultures  - Restarted tube feeds, remains at high aspiration risk     #COPD:  - c/w albuterol  - pulm (Jaime), recs appreciated    #GERD:  - c/w carafate and PPI     # VTE ppx:  - Heparin for DVT ppx

## 2021-12-11 NOTE — PROGRESS NOTE ADULT - SUBJECTIVE AND OBJECTIVE BOX
Name:          Michael Villarreal   YOB: 1957  Date:     6/6/2017      Robson Ramos, PRECIOUS.  [address]  [place] [address]     Salomon Mendez MD  [address]  [place] [address]  Phone: [phone]  Back Line: [phone]  Fax: [phone]  E-mail: [email or web address]   Dear Dr. Ramos,    We had the pleasure of seeing your patient, Michael Villarreal, in Cardiology Clinic at [facility] today.    As you know, he is a 60-year-old man with a history of paroxysmal atrial fibrillation, obesity, hypertension, and dyslipidemia.    He is doing very well today with really no cardiovascular complaints. His blood pressure is wonderfully controlled as usual on metoprolol. I reviewed with him his lipids, and I would not change his lipid regimen resolutely. He requires very infrequent Rythmol for paroxysmal atrial fibrillation. We discussed diet, and exercise. I again reviewed with him stroke risk with atrial fibrillation, and we have opted for aspirin at this time.    We will have the patient follow-up in one year.    Thank you for the referral and please do not hesitate to contact me at any time. My contact information is listed above.    This note was dictated using Dragon speech recognition software.     A full note including my physical examination and a full list of rectified medications is available in our medical record, and can be faxed as well.    Salomon Mendez MD  Cardiologist  [facility]     CAPILLARY BLOOD GLUCOSE      POCT Blood Glucose.: 159 mg/dL (11 Dec 2021 11:32)  POCT Blood Glucose.: 173 mg/dL (11 Dec 2021 05:10)  POCT Blood Glucose.: 170 mg/dL (10 Dec 2021 23:09)  POCT Blood Glucose.: 185 mg/dL (10 Dec 2021 18:50)      Vital Signs Last 24 Hrs  T(C): 37.2 (11 Dec 2021 08:23), Max: 37.2 (11 Dec 2021 08:23)  T(F): 99 (11 Dec 2021 08:23), Max: 99 (11 Dec 2021 08:23)  HR: 106 (11 Dec 2021 07:59) (63 - 106)  BP: 154/60 (11 Dec 2021 07:59) (101/57 - 154/60)  BP(mean): 89 (11 Dec 2021 07:59) (70 - 89)  RR: 29 (11 Dec 2021 07:59) (17 - 32)  SpO2: 97% (11 Dec 2021 07:59) (96% - 100%)    Respiratory: CTA B/L  CV: RRR, S1S2, no murmurs, rubs or gallops  Abdominal: Soft, NT, ND +BS, Last BM  Extremities: No edema, + peripheral pulses     12-11    137  |  103  |  37<H>  ----------------------------<  175<H>  3.6   |  24  |  1.22    Ca    8.3<L>      11 Dec 2021 09:40  Phos  2.7     12-10  Mg     3.4     12-10    TPro  7.0  /  Alb  2.0<L>  /  TBili  0.5  /  DBili  x   /  AST  28  /  ALT  32  /  AlkPhos  221<H>  12-11      dextrose 40% Gel 15 Gram(s) Oral once  dextrose 50% Injectable 25 Gram(s) IV Push once  dextrose 50% Injectable 12.5 Gram(s) IV Push once  dextrose 50% Injectable 25 Gram(s) IV Push once  glucagon  Injectable 1 milliGRAM(s) IntraMuscular once  insulin lispro (ADMELOG) corrective regimen sliding scale   SubCutaneous every 6 hours

## 2021-12-11 NOTE — PROGRESS NOTE ADULT - ASSESSMENT
77 yo F sent from [facility] home with abnormal labs. Patient unable to contribute to history. Had labs drawn this afternoon and reportedly has a Hgb of 6. Patient does not have a vaughn catheter. No report of fever, vomiting, bleeding. Receiving IV Zosyn through PICC line rt arm    2 diff GI MDs notes reviewed - not a candidate for EGD  on TF  on Hep sq dvt p  GI follow up - serial Hgb  ct imaging reviewed - poss PNA -   Midodrine added -   vs noted  HD reviewed       HCP  Pt is full code  CCM and Cardio notes reviewed  lives in SNF  vent dep -   end stage dementia  trach and peg  HOB elev  asp prec  oral hygiene  skin care  assist with all needs - ADL  work up in progress

## 2021-12-12 ENCOUNTER — TRANSCRIPTION ENCOUNTER (OUTPATIENT)
Age: 76
End: 2021-12-12

## 2021-12-12 DIAGNOSIS — D63.8 ANEMIA IN OTHER CHRONIC DISEASES CLASSIFIED ELSEWHERE: ICD-10-CM

## 2021-12-12 LAB
-  AMIKACIN: SIGNIFICANT CHANGE UP
-  AMIKACIN: SIGNIFICANT CHANGE UP
-  AMOXICILLIN/CLAVULANIC ACID: SIGNIFICANT CHANGE UP
-  AMPICILLIN/SULBACTAM: SIGNIFICANT CHANGE UP
-  AMPICILLIN: SIGNIFICANT CHANGE UP
-  AZTREONAM: SIGNIFICANT CHANGE UP
-  AZTREONAM: SIGNIFICANT CHANGE UP
-  CEFAZOLIN: SIGNIFICANT CHANGE UP
-  CEFEPIME: SIGNIFICANT CHANGE UP
-  CEFEPIME: SIGNIFICANT CHANGE UP
-  CEFOXITIN: SIGNIFICANT CHANGE UP
-  CEFTAZIDIME/AVIBACTAM: SIGNIFICANT CHANGE UP
-  CEFTAZIDIME: SIGNIFICANT CHANGE UP
-  CEFTAZIDIME: SIGNIFICANT CHANGE UP
-  CEFTOLOZANE/TAZOBACTAM: SIGNIFICANT CHANGE UP
-  CEFTRIAXONE: SIGNIFICANT CHANGE UP
-  CIPROFLOXACIN: SIGNIFICANT CHANGE UP
-  CIPROFLOXACIN: SIGNIFICANT CHANGE UP
-  ERTAPENEM: SIGNIFICANT CHANGE UP
-  GENTAMICIN: SIGNIFICANT CHANGE UP
-  GENTAMICIN: SIGNIFICANT CHANGE UP
-  IMIPENEM: SIGNIFICANT CHANGE UP
-  LEVOFLOXACIN: SIGNIFICANT CHANGE UP
-  MEROPENEM: SIGNIFICANT CHANGE UP
-  MEROPENEM: SIGNIFICANT CHANGE UP
-  PIPERACILLIN/TAZOBACTAM: SIGNIFICANT CHANGE UP
-  PIPERACILLIN/TAZOBACTAM: SIGNIFICANT CHANGE UP
-  TOBRAMYCIN: SIGNIFICANT CHANGE UP
-  TOBRAMYCIN: SIGNIFICANT CHANGE UP
-  TRIMETHOPRIM/SULFAMETHOXAZOLE: SIGNIFICANT CHANGE UP
-  TRIMETHOPRIM/SULFAMETHOXAZOLE: SIGNIFICANT CHANGE UP
ANION GAP SERPL CALC-SCNC: 8 MMOL/L — SIGNIFICANT CHANGE UP (ref 5–17)
BUN SERPL-MCNC: 35 MG/DL — HIGH (ref 7–23)
CALCIUM SERPL-MCNC: 8 MG/DL — LOW (ref 8.4–10.5)
CHLORIDE SERPL-SCNC: 105 MMOL/L — SIGNIFICANT CHANGE UP (ref 96–108)
CO2 SERPL-SCNC: 28 MMOL/L — SIGNIFICANT CHANGE UP (ref 22–31)
CREAT SERPL-MCNC: 1.13 MG/DL — SIGNIFICANT CHANGE UP (ref 0.5–1.3)
CULTURE RESULTS: SIGNIFICANT CHANGE UP
GLUCOSE BLDC GLUCOMTR-MCNC: 157 MG/DL — HIGH (ref 70–99)
GLUCOSE SERPL-MCNC: 155 MG/DL — HIGH (ref 70–99)
HCT VFR BLD CALC: 31.5 % — LOW (ref 34.5–45)
HGB BLD-MCNC: 9.1 G/DL — LOW (ref 11.5–15.5)
MAGNESIUM SERPL-MCNC: 2.1 MG/DL — SIGNIFICANT CHANGE UP (ref 1.6–2.6)
MCHC RBC-ENTMCNC: 24.9 PG — LOW (ref 27–34)
MCHC RBC-ENTMCNC: 28.9 GM/DL — LOW (ref 32–36)
MCV RBC AUTO: 86.1 FL — SIGNIFICANT CHANGE UP (ref 80–100)
METHOD TYPE: SIGNIFICANT CHANGE UP
NRBC # BLD: 0 /100 WBCS — SIGNIFICANT CHANGE UP (ref 0–0)
ORGANISM # SPEC MICROSCOPIC CNT: SIGNIFICANT CHANGE UP
PHOSPHATE SERPL-MCNC: 2.2 MG/DL — LOW (ref 2.5–4.5)
PLATELET # BLD AUTO: 235 K/UL — SIGNIFICANT CHANGE UP (ref 150–400)
POTASSIUM SERPL-MCNC: 4.1 MMOL/L — SIGNIFICANT CHANGE UP (ref 3.5–5.3)
POTASSIUM SERPL-SCNC: 4.1 MMOL/L — SIGNIFICANT CHANGE UP (ref 3.5–5.3)
RBC # BLD: 3.66 M/UL — LOW (ref 3.8–5.2)
RBC # FLD: 17.4 % — HIGH (ref 10.3–14.5)
SODIUM SERPL-SCNC: 141 MMOL/L — SIGNIFICANT CHANGE UP (ref 135–145)
SPECIMEN SOURCE: SIGNIFICANT CHANGE UP
WBC # BLD: 5.82 K/UL — SIGNIFICANT CHANGE UP (ref 3.8–10.5)
WBC # FLD AUTO: 5.82 K/UL — SIGNIFICANT CHANGE UP (ref 3.8–10.5)

## 2021-12-12 PROCEDURE — 99233 SBSQ HOSP IP/OBS HIGH 50: CPT

## 2021-12-12 RX ORDER — SODIUM,POTASSIUM PHOSPHATES 278-250MG
1 POWDER IN PACKET (EA) ORAL ONCE
Refills: 0 | Status: COMPLETED | OUTPATIENT
Start: 2021-12-12 | End: 2021-12-12

## 2021-12-12 RX ORDER — INSULIN GLARGINE 100 [IU]/ML
36 INJECTION, SOLUTION SUBCUTANEOUS
Qty: 0 | Refills: 0 | DISCHARGE

## 2021-12-12 RX ORDER — SODIUM CHLORIDE 9 MG/ML
1000 INJECTION INTRAMUSCULAR; INTRAVENOUS; SUBCUTANEOUS
Refills: 0 | Status: ACTIVE | OUTPATIENT
Start: 2021-12-12 | End: 2021-12-14

## 2021-12-12 RX ADMIN — Medication 1 MILLIGRAM(S): at 12:01

## 2021-12-12 RX ADMIN — PANTOPRAZOLE SODIUM 40 MILLIGRAM(S): 20 TABLET, DELAYED RELEASE ORAL at 17:51

## 2021-12-12 RX ADMIN — SIMETHICONE 80 MILLIGRAM(S): 80 TABLET, CHEWABLE ORAL at 11:50

## 2021-12-12 RX ADMIN — ALBUTEROL 2 PUFF(S): 90 AEROSOL, METERED ORAL at 08:42

## 2021-12-12 RX ADMIN — HEPARIN SODIUM 5000 UNIT(S): 5000 INJECTION INTRAVENOUS; SUBCUTANEOUS at 06:25

## 2021-12-12 RX ADMIN — METHOCARBAMOL 250 MILLIGRAM(S): 500 TABLET, FILM COATED ORAL at 06:25

## 2021-12-12 RX ADMIN — Medication 2: at 11:50

## 2021-12-12 RX ADMIN — CHLORHEXIDINE GLUCONATE 15 MILLILITER(S): 213 SOLUTION TOPICAL at 06:26

## 2021-12-12 RX ADMIN — Medication 1 MILLIGRAM(S): at 21:15

## 2021-12-12 RX ADMIN — SIMETHICONE 80 MILLIGRAM(S): 80 TABLET, CHEWABLE ORAL at 17:52

## 2021-12-12 RX ADMIN — Medication 1 GRAM(S): at 06:25

## 2021-12-12 RX ADMIN — PIPERACILLIN AND TAZOBACTAM 25 GRAM(S): 4; .5 INJECTION, POWDER, LYOPHILIZED, FOR SOLUTION INTRAVENOUS at 13:08

## 2021-12-12 RX ADMIN — SODIUM CHLORIDE 65 MILLILITER(S): 9 INJECTION INTRAMUSCULAR; INTRAVENOUS; SUBCUTANEOUS at 09:49

## 2021-12-12 RX ADMIN — METHOCARBAMOL 250 MILLIGRAM(S): 500 TABLET, FILM COATED ORAL at 17:51

## 2021-12-12 RX ADMIN — CHLORHEXIDINE GLUCONATE 15 MILLILITER(S): 213 SOLUTION TOPICAL at 17:51

## 2021-12-12 RX ADMIN — ALBUTEROL 2 PUFF(S): 90 AEROSOL, METERED ORAL at 13:57

## 2021-12-12 RX ADMIN — Medication 1 MILLIGRAM(S): at 18:01

## 2021-12-12 RX ADMIN — Medication 1 GRAM(S): at 17:52

## 2021-12-12 RX ADMIN — PANTOPRAZOLE SODIUM 40 MILLIGRAM(S): 20 TABLET, DELAYED RELEASE ORAL at 06:24

## 2021-12-12 RX ADMIN — SIMETHICONE 80 MILLIGRAM(S): 80 TABLET, CHEWABLE ORAL at 06:34

## 2021-12-12 RX ADMIN — PIPERACILLIN AND TAZOBACTAM 25 GRAM(S): 4; .5 INJECTION, POWDER, LYOPHILIZED, FOR SOLUTION INTRAVENOUS at 21:09

## 2021-12-12 RX ADMIN — Medication 1 MILLIGRAM(S): at 22:06

## 2021-12-12 RX ADMIN — MIDODRINE HYDROCHLORIDE 5 MILLIGRAM(S): 2.5 TABLET ORAL at 13:08

## 2021-12-12 RX ADMIN — Medication 2: at 06:57

## 2021-12-12 RX ADMIN — Medication 1 PACKET(S): at 15:40

## 2021-12-12 RX ADMIN — MIDODRINE HYDROCHLORIDE 5 MILLIGRAM(S): 2.5 TABLET ORAL at 06:25

## 2021-12-12 RX ADMIN — POLYETHYLENE GLYCOL 3350 17 GRAM(S): 17 POWDER, FOR SOLUTION ORAL at 06:24

## 2021-12-12 RX ADMIN — SODIUM CHLORIDE 50 MILLILITER(S): 9 INJECTION INTRAMUSCULAR; INTRAVENOUS; SUBCUTANEOUS at 13:11

## 2021-12-12 RX ADMIN — Medication 1 GRAM(S): at 00:00

## 2021-12-12 RX ADMIN — ALBUTEROL 2 PUFF(S): 90 AEROSOL, METERED ORAL at 20:37

## 2021-12-12 RX ADMIN — PIPERACILLIN AND TAZOBACTAM 25 GRAM(S): 4; .5 INJECTION, POWDER, LYOPHILIZED, FOR SOLUTION INTRAVENOUS at 06:24

## 2021-12-12 RX ADMIN — ALBUTEROL 2 PUFF(S): 90 AEROSOL, METERED ORAL at 23:40

## 2021-12-12 RX ADMIN — Medication 1 MILLIGRAM(S): at 06:24

## 2021-12-12 RX ADMIN — Medication 1 MILLIGRAM(S): at 01:36

## 2021-12-12 RX ADMIN — SENNA PLUS 2 TABLET(S): 8.6 TABLET ORAL at 21:09

## 2021-12-12 RX ADMIN — Medication 1 GRAM(S): at 11:50

## 2021-12-12 RX ADMIN — ALBUTEROL 2 PUFF(S): 90 AEROSOL, METERED ORAL at 01:20

## 2021-12-12 RX ADMIN — SIMETHICONE 80 MILLIGRAM(S): 80 TABLET, CHEWABLE ORAL at 00:00

## 2021-12-12 RX ADMIN — HEPARIN SODIUM 5000 UNIT(S): 5000 INJECTION INTRAVENOUS; SUBCUTANEOUS at 17:51

## 2021-12-12 NOTE — PROGRESS NOTE ADULT - SUBJECTIVE AND OBJECTIVE BOX
Chief Complaint: Abnormal labs    Interval Events: No events overnight    Review of Systems:  Unable to obtain    Physical Exam:  Vital Signs Last 24 Hrs  T(C): 36.9 (11 Dec 2021 23:57), Max: 37.4 (11 Dec 2021 20:08)  T(F): 98.5 (11 Dec 2021 23:57), Max: 99.3 (11 Dec 2021 20:08)  HR: 58 (12 Dec 2021 06:00) (58 - 96)  BP: 107/53 (12 Dec 2021 06:00) (103/64 - 160/63)  BP(mean): 70 (12 Dec 2021 06:00) (64 - 88)  RR: 18 (12 Dec 2021 06:00) (15 - 36)  SpO2: 100% (12 Dec 2021 06:00) (93% - 100%)  General: Unresponsive  HEENT: Trach  Neck: No JVD, no carotid bruit  Lungs: CTAB  CV: RRR, nl S1/S2, no M/R/G  Abdomen: S/NT/ND, +BS  Extremities: No LE edema, no cyanosis  Neuro: AAOx0  Skin: No rash    Labs:    12-12    141  |  105  |  35<H>  ----------------------------<  155<H>  4.1   |  28  |  1.13    Ca    8.0<L>      12 Dec 2021 06:29  Phos  2.2     12-12  Mg     2.1     12-12    TPro  7.0  /  Alb  2.0<L>  /  TBili  0.5  /  DBili  x   /  AST  28  /  ALT  32  /  AlkPhos  221<H>  12-11                        9.1    5.82  )-----------( 235      ( 12 Dec 2021 06:29 )             31.5     Telemetry: Sinus rhythm

## 2021-12-12 NOTE — PROGRESS NOTE ADULT - SUBJECTIVE AND OBJECTIVE BOX
Patient is a 76y Female whom presented to the hospital with ckd and manasa     PAST MEDICAL & SURGICAL HISTORY:  Dementia of frontal lobe type    Aphasic stroke    Diabetes mellitus    Respiratory failure    Hypertension    GERD (gastroesophageal reflux disease)    Constipation    Respiratory failure    CVA (cerebral vascular accident)    HTN (hypertension)    DM (diabetes mellitus)    Advanced dementia    COVID-19 virus detected    Quadriplegia    Pneumonia    Type II diabetes mellitus    Hx of appendectomy    Gastrostomy in place    Tracheostomy in place    Tracheostomy tube present    Feeding by G-tube        MEDICATIONS  (STANDING):  ALBUTerol    90 MICROgram(s) HFA Inhaler 2 Puff(s) Inhalation every 6 hours  chlorhexidine 0.12% Liquid 15 milliLiter(s) Oral Mucosa every 12 hours  dextrose 40% Gel 15 Gram(s) Oral once  dextrose 5%. 1000 milliLiter(s) (50 mL/Hr) IV Continuous <Continuous>  dextrose 5%. 1000 milliLiter(s) (100 mL/Hr) IV Continuous <Continuous>  dextrose 50% Injectable 25 Gram(s) IV Push once  dextrose 50% Injectable 12.5 Gram(s) IV Push once  dextrose 50% Injectable 25 Gram(s) IV Push once  glucagon  Injectable 1 milliGRAM(s) IntraMuscular once  insulin lispro (ADMELOG) corrective regimen sliding scale   SubCutaneous every 6 hours  lactated ringers. 1000 milliLiter(s) (100 mL/Hr) IV Continuous <Continuous>  methocarbamol 250 milliGRAM(s) Oral two times a day  pantoprazole  Injectable 40 milliGRAM(s) IV Push every 12 hours  piperacillin/tazobactam IVPB.. 3.375 Gram(s) IV Intermittent every 8 hours  polyethylene glycol 3350 17 Gram(s) Oral every 12 hours  senna 2 Tablet(s) Oral at bedtime  simethicone 80 milliGRAM(s) Chew every 6 hours  sucralfate 1 Gram(s) Oral every 6 hours  tiotropium 18 MICROgram(s) Capsule 1 Capsule(s) Inhalation daily  vancomycin  IVPB 750 milliGRAM(s) IV Intermittent every 12 hours      Allergies    codeine (Hives)    Intolerances        SOCIAL HISTORY:  Denies ETOh,Smoking,     FAMILY HISTORY:  No pertinent family history in first degree relatives        REVIEW OF SYSTEMS:    unable to obtained a good review system                                                                                            9.1    5.82  )-----------( 235      ( 12 Dec 2021 06:29 )             31.5       CBC Full  -  ( 12 Dec 2021 06:29 )  WBC Count : 5.82 K/uL  RBC Count : 3.66 M/uL  Hemoglobin : 9.1 g/dL  Hematocrit : 31.5 %  Platelet Count - Automated : 235 K/uL  Mean Cell Volume : 86.1 fl  Mean Cell Hemoglobin : 24.9 pg  Mean Cell Hemoglobin Concentration : 28.9 gm/dL  Auto Neutrophil # : x  Auto Lymphocyte # : x  Auto Monocyte # : x  Auto Eosinophil # : x  Auto Basophil # : x  Auto Neutrophil % : x  Auto Lymphocyte % : x  Auto Monocyte % : x  Auto Eosinophil % : x  Auto Basophil % : x      12-12    141  |  105  |  35<H>  ----------------------------<  155<H>  4.1   |  28  |  1.13    Ca    8.0<L>      12 Dec 2021 06:29  Phos  2.2     12-12  Mg     2.1     12-12    TPro  7.0  /  Alb  2.0<L>  /  TBili  0.5  /  DBili  x   /  AST  28  /  ALT  32  /  AlkPhos  221<H>  12-11      CAPILLARY BLOOD GLUCOSE      POCT Blood Glucose.: 157 mg/dL (12 Dec 2021 06:38)  POCT Blood Glucose.: 183 mg/dL (11 Dec 2021 23:57)      Vital Signs Last 24 Hrs  T(C): 36.8 (12 Dec 2021 15:50), Max: 37.4 (11 Dec 2021 20:08)  T(F): 98.3 (12 Dec 2021 15:50), Max: 99.3 (11 Dec 2021 20:08)  HR: 66 (12 Dec 2021 16:00) (58 - 88)  BP: 129/62 (12 Dec 2021 16:00) (103/64 - 160/63)  BP(mean): 82 (12 Dec 2021 16:00) (64 - 88)  RR: 19 (12 Dec 2021 16:00) (9 - 37)  SpO2: 100% (12 Dec 2021 16:00) (93% - 100%)                   PHYSICAL EXAM:    Constitutional: NAD  HEENT: conjunctive   clear   Neck:  No JVD  Respiratory: pos decrease bs pos trach  Cardiovascular: S1 and S2  Gastrointestinal: BS+, soft, pos peg   Extremities: No peripheral edema

## 2021-12-12 NOTE — CONSULT NOTE ADULT - PROVIDER SPECIALTY LIST ADULT
Cardiology
Critical Care
Gastroenterology
Infectious Disease
Palliative Care
Gastroenterology
Nephrology
Endocrinology
Heme/Onc

## 2021-12-12 NOTE — DISCHARGE NOTE PROVIDER - HOSPITAL COURSE
FROM ADMISSION H+P:   HPI:  ***Patient with dementia and is not responsive.  Collateral information obtained from chart and notes.***    75F with chronic resp failure - vent dependent, hx of subarachnoid hemorrhage, hx of cardiac arrest, dementia s/p PEG, HTN, DM2 on insulin, hx of CVA, GERD, COPD, admission here from 9/25 to 10/4 and 11/7-11/11 for respiratory failure/sepsis possibly 2/2 aspiration vs vent associated PNA who presents from [facility] for abnormal labs.  Patient does not contribute to history. In the ED, patient's initial triage vitals were BP 88/37, HR 81, RR 18, 100% on vent and T 99 F.  Labs showed leukocytosis of 12.18, HGB 5.2, BUN/Cr 89/2.00. CXR: Tracheostomy cannula reidentified in position. No change heart mediastinum. Widespread bilateral airspace disease slightly improved.  correlate for pulmonary edema and/or infection. No significant pleural effusion. No pneumothorax. Patient's hand projects over portion of the right base. CT chest and A/P pending  (08 Dec 2021 15:50)      ---  HOSPITAL COURSE:   #Anemia   HGB stable  Has hx of anemia on previous hospitalizations  Occult positive    GI consulted Dr. Nieves, no plans for scope  S/p 2 units PRBC on admission    demanding endoscopy and not realistic about expectations despite multiple conversations with myself and Dr. Nieves.   Alternate opinion from GI Dr. Dupree appreciated. No plan for endoscopy   Hematology input appreciated: would check CBC weekly if possible and arrange for ambulatory transfusions as needed.  Expect patient will remain transfusion dependent.  Would not treat with ESAs at present     # RYAN   Improving   Baseline creatinine .9  Avoid nephrotoxic meds  gentle hydration with ns   Strict I&O's  Nephrology following   Repeat BMP at [facility]    # aspiration PNA  - Sepsis poa  - On empiric abx with Zosyn, vanc dc'd, completed course of abx   - ID following, reccs appreciated   - On tube feeds, remains at high aspiration risk     #COPD:  - c/w albuterol  - pulm (Jaime), recs appreciated    #GERD:  - c/w carafate and PPI   ---  TIME SPENT:  I, the attending physician, was physically present for the key portions of the evaluation and management (E/M) service provided. The total amount of time spent reviewing the hospital notes, laboratory values, imaging findings, assessing/counseling the patient, discussing with consultant physicians, social work, nursing staff was 45 minutes FROM ADMISSION H+P:   HPI:  ***Patient with dementia and is not responsive.  Collateral information obtained from chart and notes.***    75F with chronic resp failure - vent dependent, hx of subarachnoid hemorrhage, hx of cardiac arrest, dementia s/p PEG, HTN, DM2 on insulin, hx of CVA, GERD, COPD, admission here from 9/25 to 10/4 and 11/7-11/11 for respiratory failure/sepsis possibly 2/2 aspiration vs vent associated PNA who presents from [facility] for abnormal labs.  Patient does not contribute to history. In the ED, patient's initial triage vitals were BP 88/37, HR 81, RR 18, 100% on vent and T 99 F.  Labs showed leukocytosis of 12.18, HGB 5.2, BUN/Cr 89/2.00. CXR: Tracheostomy cannula reidentified in position. No change heart mediastinum. Widespread bilateral airspace disease slightly improved.  correlate for pulmonary edema and/or infection. No significant pleural effusion. No pneumothorax. Patient's hand projects over portion of the right base. CT chest and A/P pending  (08 Dec 2021 15:50)      ---  HOSPITAL COURSE:   #Anemia   HGB stable  Has hx of anemia on previous hospitalizations  Occult positive    GI consulted Dr. Nieves, no plans for scope  S/p 2 units PRBC on admission    demanding endoscopy and not realistic about expectations despite multiple conversations with myself and Dr. Nieves.   Alternate opinion from GI Dr. Dupree appreciated. No plan for endoscopy   Hematology input appreciated: would check CBC weekly if possible and arrange for ambulatory transfusions as needed.  Expect patient will remain transfusion dependent.  Would not treat with ESAs at present     # RYAN   Improving   Baseline creatinine .9  Avoid nephrotoxic meds  gentle hydration with ns while inpatient   Strict I&O's  Nephrology following   Repeat BMP at [facility]    # aspiration PNA  - Sepsis poa  - On empiric abx with Zosyn, vanc dc'd, completed course of abx   - ID following, reccs appreciated   - On tube feeds, remains at high aspiration risk     #COPD:  - c/w albuterol  - pulm (Jaime), recs appreciated    #GERD:  - c/w carafate and PPI     #PEG  Tube replaced inpatient         ---  TIME SPENT:  I, the attending physician, was physically present for the key portions of the evaluation and management (E/M) service provided. The total amount of time spent reviewing the hospital notes, laboratory values, imaging findings, assessing/counseling the patient, discussing with consultant physicians, social work, nursing staff was 45 minutes FROM ADMISSION H+P:   HPI:  ***Patient with dementia and is not responsive.  Collateral information obtained from chart and notes.***    75F with chronic resp failure - vent dependent, hx of subarachnoid hemorrhage, hx of cardiac arrest, dementia s/p PEG, HTN, DM2 on insulin, hx of CVA, GERD, COPD, admission here from 9/25 to 10/4 and 11/7-11/11 for respiratory failure/sepsis possibly 2/2 aspiration vs vent associated PNA who presents from [facility] for abnormal labs.  Patient does not contribute to history. In the ED, patient's initial triage vitals were BP 88/37, HR 81, RR 18, 100% on vent and T 99 F.  Labs showed leukocytosis of 12.18, HGB 5.2, BUN/Cr 89/2.00. CXR: Tracheostomy cannula reidentified in position. No change heart mediastinum. Widespread bilateral airspace disease slightly improved.  correlate for pulmonary edema and/or infection. No significant pleural effusion. No pneumothorax. Patient's hand projects over portion of the right base. CT chest and A/P pending  (08 Dec 2021 15:50)      ---  HOSPITAL COURSE:   #Anemia   HGB stable  Has hx of anemia on previous hospitalizations  Occult positive    GI consulted Dr. Nieves, no plans for scope  S/p 2 units PRBC on admission    demanding endoscopy and not realistic about expectations despite multiple conversations with myself and Dr. Nieves.   Alternate opinion from GI Dr. Dupree appreciated. No plan for endoscopy   Hematology input appreciated: would check CBC weekly if possible and arrange for ambulatory transfusions as needed.  Expect patient will remain transfusion dependent.  Would not treat with ESAs at present     # RYAN   Improving   Baseline creatinine .9  Avoid nephrotoxic meds  gentle hydration with ns while inpatient   Strict I&O's  Nephrology following   Repeat BMP at [facility]    # aspiration PNA  - Sepsis poa  - On empiric abx with Zosyn, vanc dc'd, completed course of abx   - ID following, reccs appreciated   - On tube feeds, remains at high aspiration risk     #COPD:  - c/w albuterol  - pulm (Jaime), recs appreciated    #GERD:  - c/w carafate and PPI     #PEG  Tube replaced inpatient     # Seizures  Patient has episodes of jerking movements   Typically treated with Ativan PRN   Evaluated by neurology inpatient       ---  TIME SPENT:  I, the attending physician, was physically present for the key portions of the evaluation and management (E/M) service provided. The total amount of time spent reviewing the hospital notes, laboratory values, imaging findings, assessing/counseling the patient, discussing with consultant physicians, social work, nursing staff was 45 minutes FROM ADMISSION H+P:   HPI:  ***Patient with dementia and is not responsive.  Collateral information obtained from chart and notes.***    75F with chronic resp failure - vent dependent, hx of subarachnoid hemorrhage, hx of cardiac arrest, dementia s/p PEG, HTN, DM2 on insulin, hx of CVA, GERD, COPD, admission here from 9/25 to 10/4 and 11/7-11/11 for respiratory failure/sepsis possibly 2/2 aspiration vs vent associated PNA who presents from [facility] for abnormal labs.  Patient does not contribute to history. In the ED, patient's initial triage vitals were BP 88/37, HR 81, RR 18, 100% on vent and T 99 F.  Labs showed leukocytosis of 12.18, HGB 5.2, BUN/Cr 89/2.00. CXR: Tracheostomy cannula reidentified in position. No change heart mediastinum. Widespread bilateral airspace disease slightly improved.  correlate for pulmonary edema and/or infection. No significant pleural effusion. No pneumothorax. Patient's hand projects over portion of the right base. CT chest and A/P pending  (08 Dec 2021 15:50)      ---  HOSPITAL COURSE:     #Seizures  Patient had episodes of facial flushing, limb contraction and eye rolling for 1.5 hours  RRT was called. Was given Ativan, Versed and Fentanyl for seizures  Stat labs, CXR ordered which shows increased white count, ? new pna vs seizures  Started on Zosyn again, blood and sputum cultures ordered  Neurology recommended cont EEG, planned for transfer to [place]    As patient was being transferred from hospital vent to ambulance vent, patient required full vent support, started on Propofol and BP dropped to 80's.   Planned for trial of Versed and transfer with critical care ambulance if stable   Transfer center aware and updated. E-icu aware of all events       #Anemia   HGB improving   Has hx of anemia on previous hospitalizations  Occult positive    GI consulted Dr. Nieves, no plans for scope  S/p 2 units PRBC on admission   Alternate opinion from GI Dr. Dupree appreciated. No plan for endoscopy   Hematology input appreciated: would check CBC weekly if possible and arrange for ambulatory transfusions as needed.  Expect patient will remain transfusion dependent.  Would not treat with ESAs at present     # RYAN   stable  Baseline creatinine .9  Avoid nephrotoxic meds  repeat BMP in AM  Strict I&O's  Nephrology following     #PEG tube malfunction  GI following  Woody in place in interim   PEG tube replaced inpatient on 12/13    #Hyponatremia  improved  trend bmp     # aspiration PNA  - Sepsis poa  - On Zosyn, per ID: covering pseudomonas and serratia but not stenotrophomonas therefore likely colonization and not infection & therefore will not adjust therapy to cover this (also would involve large doses of bactrim & pt w/ CKD)  s/p levofloxacin 750mg IV x 1 on 12/13  - CXR on 12/14 with increasing infiltrates, s/p Lasix 40mg x1, restarted on abx   - ID following  - New blood cultures sent on 12/14    #COPD:  - c/w albuterol  - pulm (Jaime), recs appreciated    #GERD:  - c/w carafate and PPI     # VTE ppx:  - Heparin for DVT ppx     Had extensive conversation with  Marciano. He is in agreement with transfer to [facility] under ICU level of care     ---  TIME SPENT:  I, the attending physician, was physically present for the key portions of the evaluation and management (E/M) service provided. The total amount of time spent reviewing the hospital notes, laboratory values, imaging findings, assessing/counseling the patient, discussing with consultant physicians, social work, nursing staff was 45 minutes FROM ADMISSION H+P:   HPI:  ***Patient with dementia and is not responsive.  Collateral information obtained from chart and notes.***    75F with chronic resp failure - vent dependent, hx of subarachnoid hemorrhage, hx of cardiac arrest, dementia s/p PEG, HTN, DM2 on insulin, hx of CVA, GERD, COPD, admission here from 9/25 to 10/4 and 11/7-11/11 for respiratory failure/sepsis possibly 2/2 aspiration vs vent associated PNA who presents from [facility] for abnormal labs.  Patient does not contribute to history. In the ED, patient's initial triage vitals were BP 88/37, HR 81, RR 18, 100% on vent and T 99 F.  Labs showed leukocytosis of 12.18, HGB 5.2, BUN/Cr 89/2.00. CXR: Tracheostomy cannula reidentified in position. No change heart mediastinum. Widespread bilateral airspace disease slightly improved.  correlate for pulmonary edema and/or infection. No significant pleural effusion. No pneumothorax. Patient's hand projects over portion of the right base. CT chest and A/P pending  (08 Dec 2021 15:50)      ---  HOSPITAL COURSE:     #Seizures  Patient had episodes of facial flushing, limb contraction and eye rolling for 1.5 hours  RRT was called. Was given Ativan, Versed and Fentanyl for seizures  Stat labs, CXR ordered which shows increased white count, ? new pna vs seizures  Started on Zosyn again, blood and sputum cultures ordered  Neurology recommended cont EEG, planned for transfer to [place]    As patient was being transferred from hospital vent to ambulance vent, patient required full vent support, started on Propofol and BP dropped to 80's.   Planned for trial of Versed and transfer with critical care ambulance if stable   Transfer center aware and updated. E-icu aware of all events       75F Chronic Respiratory Failure, Dementia, HTN, DM2, COPD, admitted for Acute Encephalopathy and Aspiration PNA    Aspiration PNA / Chronic Respiratory Failure / COPD - Chronic Respiratory Failure and Vent Dependant from history of SAH and Cardiac Arrest. Sepsis POA and on IV Zosyn.  Persistent leukocytosis. Albuterol PRN Pulmonary and Infectious Disease following.       Abnormal Body Movements - EEG was planned to rule out epileptic activity but Neurology does not feel this is epileptic in nature due to movements being provoked by agitation. Ativan added to regimen. Spouse feels this is due to GI and Oxygenation related issues and we will not pursue EEG anymore due to low yield.  Had BM last night. Neurology following.   Anemia - Possible GI Bleed. GI consulted with no plans for scope.  Will monitor and transfuse as needed.     CKD 3 - Baseline Creatinine 1.0. Avoid Nephrotoxic agents and monitor BMP and electrolytes. Nephrology following.     PEG Tube Place Malfunction - PEG Tube replaced 12/13    GERD - PPI     Disposition -  Patient is medically optimized at this point for discharge back to facility.    ---  TIME SPENT:  I, the attending physician, was physically present for the key portions of the evaluation and management (E/M) service provided. The total amount of time spent reviewing the hospital notes, laboratory values, imaging findings, assessing/counseling the patient, discussing with consultant physicians, social work, nursing staff was 45 minutes

## 2021-12-12 NOTE — PROGRESS NOTE ADULT - ASSESSMENT
75F with chronic resp failure - vent dependent, hx of subarachnoid hemorrhage, hx of cardiac arrest, dementia s/p PEG, HTN, DM2 on insulin, hx of CVA, GERD, COPD, admission here from 9/25 to 10/4 and 11/7-11/11 for respiratory failure/sepsis possibly 2/2 aspiration vs vent associated PNA who presents from [facility] for abnormal labs.    #Anemia   HGB improving   Has hx of anemia on previous hospitalizations  Occult positive    GI consulted Dr. Nieves, no plans for scope  S/p 2 units PRBC on admission    demanding endoscopy and not realistic about expectations despite multiple conversations with myself and Dr. Nieves.   Alternate opinion from GI Dr. Dupree appreciated. No plan for endoscopy   Hematology input appreciated: would check CBC weekly if possible and arrange for ambulatory transfusions as needed.  Expect patient will remain transfusion dependent.  Would not treat with ESAs at present     # RYAN   Improving   Baseline creatinine .9  Avoid nephrotoxic meds  gentle hydration with ns   repeat BMP in AM  Strict I&O's  Nephrology following     #Hyponatremia  Gentle hydration with ns  improving  trend bmp     # aspiration PNA  - Sepsis poa  - On empiric abx with Zosyn, vanc dc'd   - ID following, reccs appreciated   - Follow blood and urine cultures  - On tube feeds, remains at high aspiration risk     #COPD:  - c/w albuterol  - pulm (Jaime), recs appreciated    #GERD:  - c/w carafate and PPI     # VTE ppx:  - Heparin for DVT ppx

## 2021-12-12 NOTE — CONSULT NOTE ADULT - CONSULT REASON
Chronic respiratory failure
evaluation of anemia D64.89
VENT
ckd and manasa
PNA
Second Opinion
dm2 uncontrolled
chr resp failure  sepsis  anemia  dementia

## 2021-12-12 NOTE — PROGRESS NOTE ADULT - SUBJECTIVE AND OBJECTIVE BOX
Patient is a 76y old  Female who presents with a chief complaint of Anemia (10 Dec 2021 16:28)    BRIEF HOSPITAL COURSE:   75 y/o F w/ pmh of htn, dm, CVA, dementia, recent hx of cardiac arrest at [facility], chronic resp failure s/p trach/peg presented to [facility] on 12/08/2021 from SNF for abnormal labs. Pt in the ED pt was found to be anemic, RYAN and Hyponatermia, pt was given 2U of PRBC and h/h improved.    Events last 24 hours:   -Persistently restless/agitated requiring more frequent dosing of IVP Ativan.   -Remains on ventilatory support, VAC 18 / 400 / +5 / 40%.  -Afebrile.    PAST MEDICAL & SURGICAL HISTORY:  Dementia of frontal lobe type  Aphasic stroke  Diabetes mellitus  Respiratory failure  Hypertension  GERD (gastroesophageal reflux disease)  Constipation  Respiratory failure  CVA (cerebral vascular accident)  HTN (hypertension)  DM (diabetes mellitus)  Advanced dementia  COVID-19 virus detected  Quadriplegia  Pneumonia  Type II diabetes mellitus  Hx of appendectomy  Gastrostomy in place  Tracheostomy in place  Tracheostomy tube present  Feeding by G-tube    Review of Systems:  Due to pt's mental status, subjective information was not able to be obtained from the patient. History was obtained, to the extent possible, from review of the chart and collateral sources of information.     Medications:  piperacillin/tazobactam IVPB.. 3.375 Gram(s) IV Intermittent every 8 hours  midodrine 5 milliGRAM(s) Oral every 8 hours  ALBUTerol    90 mICROgram(s) HFA Inhaler 2 Puff(s) Inhalation every 6 hours  acetaminophen     Tablet .. 650 milliGRAM(s) Oral every 6 hours PRN  LORazepam   Injectable 1 milliGRAM(s) IV Push every 4 hours PRN  methocarbamol 250 milliGRAM(s) Oral two times a day  heparin   Injectable 5000 Unit(s) SubCutaneous every 12 hours  pantoprazole  Injectable 40 milliGRAM(s) IV Push every 12 hours  polyethylene glycol 3350 17 Gram(s) Oral every 12 hours  senna 2 Tablet(s) Oral at bedtime  simethicone 80 milliGRAM(s) Chew every 6 hours  sucralfate suspension 1 Gram(s) Enteral Tube every 6 hours  dextrose 40% Gel 15 Gram(s) Oral once  dextrose 50% Injectable 25 Gram(s) IV Push once  dextrose 50% Injectable 12.5 Gram(s) IV Push once  dextrose 50% Injectable 25 Gram(s) IV Push once  glucagon  Injectable 1 milliGRAM(s) IntraMuscular once  insulin lispro (ADMELOG) corrective regimen sliding scale   SubCutaneous every 6 hours  dextrose 5%. 1000 milliLiter(s) IV Continuous <Continuous>  dextrose 5%. 1000 milliLiter(s) IV Continuous <Continuous>  sodium chloride 0.9%. 1000 milliLiter(s) IV Continuous <Continuous>  chlorhexidine 0.12% Liquid 15 milliLiter(s) Oral Mucosa every 12 hours    Mode: AC/ CMV (Assist Control/ Continuous Mandatory Ventilation)  RR (machine): 18  TV (machine): 400  FiO2: 40  PEEP: 5  ITime: 1  MAP: 11  PIP: 33    ICU Vital Signs Last 24 Hrs  T(C): 36.9 (11 Dec 2021 23:57), Max: 37.4 (11 Dec 2021 20:08)  T(F): 98.5 (11 Dec 2021 23:57), Max: 99.3 (11 Dec 2021 20:08)  HR: 67 (11 Dec 2021 22:00) (64 - 106)  BP: 103/64 (11 Dec 2021 22:00) (101/57 - 154/60)  BP(mean): 76 (11 Dec 2021 22:00) (64 - 89)  ABP: --  ABP(mean): --  RR: 19 (11 Dec 2021 22:00) (15 - 36)  SpO2: 93% (11 Dec 2021 22:00) (93% - 100%)    I&O's Detail  10 Dec 2021 07:01  -  11 Dec 2021 07:00  --------------------------------------------------------  IN:    Enteral Tube Flush: 770 mL    Free Water: 180 mL    Glucerna 1.5: 600 mL    IV PiggyBack: 100 mL    sodium chloride 0.9%: 450 mL    sodium chloride 0.9%: 1170 mL  Total IN: 3270 mL  OUT:    Indwelling Catheter - Urethral (mL): 460 mL    Voided (mL): 200 mL  Total OUT: 660 mL  Total NET: 2610 mL    11 Dec 2021 07:01  -  12 Dec 2021 00:03  --------------------------------------------------------  IN:    Enteral Tube Flush: 350 mL    Glucerna 1.5: 700 mL    IV PiggyBack: 100 mL    sodium chloride 0.9%: 325 mL  Total IN: 1475 mL  OUT:  Total OUT: 0 mL  Total NET: 1475 mL    LABS:             9.4    6.98  )-----------( 224      ( 11 Dec 2021 09:39 )             31.0   12-11  137  |  103  |  37<H>  ----------------------------<  175<H>  3.6   |  24  |  1.22  Ca    8.3<L>      11 Dec 2021 09:40  Phos  2.7     12-10  Mg     3.4     12-10  TPro  7.0  /  Alb  2.0<L>  /  TBili  0.5  /  DBili  x   /  AST  28  /  ALT  32  /  AlkPhos  221<H>  12-11    CAPILLARY BLOOD GLUCOSE  POCT Blood Glucose.: 183 mg/dL (11 Dec 2021 23:57)    CULTURES:  Culture Results:   Few Pseudomonas aeruginosa  Moderate Serratia marcescens  Moderate Stenotrophomonas maltophilia  Normal Respiratory Elvira absent (12-11-21 @ 00:41)  Culture Results:   <10,000 CFU/mL Normal Urogenital Elvira (12-08-21 @ 17:55)  Culture Results:   No growth to date. (12-08-21 @ 15:24)  Culture Results:   No growth to date. (12-08-21 @ 15:24)  Rapid RVP Result: NotDetec (12-08-21 @ 11:59)    Physical Examination:  General: Elderly female.  HEENT: PERRL.  NECK: +Trach.   PULM: Decreased BS at bases b/l.  CVS: s1/s2.  ABD: Soft, nondistended, nontender, normoactive bowel sounds.  EXT: No edema, nontender. Contracted.  SKIN: Warm.    RADIOLOGY:   < from: CT Abdomen and Pelvis No Cont (12.08.21 @ 16:52) >  CONTRAST/COMPLICATIONS:  IV Contrast: NONE  Oral Contrast: NONE  Complications: None reported at time of study completion  PROCEDURE:  CT of the Chest, Abdomen and Pelvis was performed.  Sagittal and coronal reformats were performed.  FINDINGS:  CHEST:  LUNGS AND LARGE AIRWAYS: Tracheostomy terminates above the parmjit. There   are multifocal lung parenchymal opacities most prominent in dependent   locations, concerning for multifocal infection superimposed ondistal   airways impaction and atelectasis. Most of the opacities are either new   or unchanged since prior study. However, volume loss of the right lower   lobe has improved since 11/7/2021. Redemonstrated calcified material   again likely reflects aspirated contents.  PLEURA: Trace right and small left pleural effusions. No pneumothorax.  VESSELS: Normal caliber of the thoracic aorta with atheromatous change.   Main pulmonary artery size is within normal limits.  HEART: Heart size is qualitatively normal. Small pericardial fluid is   similar to prior. There is coronary artery calcification and mitral   calcification. Low attenuation of the blood pool of the heart may reflect   anemia.  MEDIASTINUM AND JHONNY: Prominent/mildly enlarged mediastinal and hilar   nodes are redemonstrated. The esophagus is distended with air probable   left-sided diverticulum at the thyroid level, image 15 series 4, similar   to prior.  CHEST WALL AND LOWER NECK: Symmetric appearance of the chest wall   musculature, without evidence of hematoma. Soft tissue edema is noted.  ABDOMEN AND PELVIS:  Solid ja evaluation limited due to noncontrast technique.  LIVER: Enlarged liver measuring 21 cm in craniocaudal dimension.  BILE DUCTS: No biliary distention.  GALLBLADDER: Cholelithiasis.  SPLEEN: Borderline enlarged measuring 13 cm in craniocaudal dimension.  PANCREAS: No main pancreatic ductal dilatation.  ADRENALS: Unchanged adrenal gland thickening.  KIDNEYS/URETERS: No hydronephrosis. Nonspecific bilateral perinephric   stranding.  BLADDER: Woody catheter. Urinary bladder is underdistended, suboptimally   characterized.  REPRODUCTIVE ORGANS: Uterus and adnexa suboptimally characterized on   noncontrast CT.  BOWEL: Gastrostomy tube terminates in the stomach. Oral contrast seen   within the mildly distended stomach and along small and large bowel   loops. No significant small bowel distention. Rectum is distended with   air and fluid, similar to prior.  PERITONEUM: No ascites, free air, or loculated fluid collection.  VESSELS: No aneurysm of the abdominal aorta. Aortoiliac and proximal   femoral atheromatous change.  RETROPERITONEUM/LYMPH NODES: Small volume nodes.  ABDOMINAL WALL: Symmetric appearance of the abdominal wall and   retroperitoneal musculature. Mild soft tissue edema.  BONES: Chronic fracture deformity of the partially imaged left femur,   with surrounding callus, redemonstrated. Diffuse osseous demineralization   and multilevel degenerative changes limits evaluation of the bones.   Central canal and neural foramina are not adequately assessed on this   study. Fixation hardware of the distal left radius limited in evaluation   due to positioning.  IMPRESSION:  Limited study without IV contrast.  CHEST:  1.Tracheostomy. Distal airways impaction and multifocal pneumonia,   possibly related to aspiration. Most of the opacities are either new or   unchanged since prior study. However, volume loss of the right lower lobe   has improved since 11/7/2021. Small left pleural effusion and trace right   pleural effusion, similar to prior. Follow to resolution with repeat   chest CT in 4 weeks.  2. Esophagus is distended with air probable left-sided diverticulum at   the thyroid level, image 15 series 4, similar to prior.  3. Coronary artery calcification and mitral calcification. Low   attenuation of the blood pool of the heart may reflect anemia.  ABDOMEN/PELVIS:  1. Mild chest and abdominal wall soft tissue edema. Symmetric appearance   of the abdominal wall and retroperitoneal musculature, without evidence   of hematoma. Correlate with clinical status of the patient, serial   hemoglobin and hematocrit to determine need for follow-up postcontrast   imaging.  2. No hydronephrosis of the kidneys.    75 y/o F w/ pmh of htn, dm, CVA, dementia, recent hx of cardiac arrest at [facility], chronic resp failure s/p trach/peg presented to [facility] on 12/08/2021 from SNF for abnormal labs admitted w/:  1. Chronic respiratory failure   2. ABLA (resolved)  3. ARF (resolved)    Plan:  NEURO:  -Ativan IVP PRN increased to q4h for agitation/restlessness.     CV:  -Maintain goal MAP >65-70. Midodrine.  -Keep K~4 and Mg>2 for optimal arrhythmia suppression     RESP:  -Remains on ventilatory support via trach, VAC 18 / 400 / +5 / 40% --> actively titrated to 30% at bedside.   -titrate settings to maintain SaO2 >90%, or pH >7.25  -Low tidal volume ventilation strategy w/ goal Tv 4-6 cc/kg of ideal body weight  -plateau pressure goal <30  -Peridex oral care  -aggressive pulmonary toilet  -Albuterol q6h.     RENAL:  -Renal function currently w/ RYAN, continues to improve.  -trend lytes/Scr daily with BMP  -I's and O's, goal UOP 0.5 cc/kg/hr  -renal dose meds and avoid nephrotoxins     GI:  -TF.   -Protonix BID.    ENDO:  -Aggressive glycemic control to limit FS glucose to <180mg/dl. ISS.    ID:  -All Cx NGTD. Empiric Zosyn.     HEME:  -Hgb stable, transfuse for Hgb <7, keep active t/s.   -DVT ppx w/ SC Heparin.  -Carafate.     TIME SPENT: 36 minutes  Evaluating/treating patient, reviewing data/labs/imaging, discussing case with multidisciplinary team, discussing plan/goals of care with patient/family. Non-inclusive of procedure time.

## 2021-12-12 NOTE — PROGRESS NOTE ADULT - ASSESSMENT
Problem: Patient Education: Go to Patient Education Activity  Goal: Patient/Family Education  Outcome: Progressing Towards Goal     Problem: Diabetes Maintenance:Ongoing  Goal: Activity/Safety  Outcome: Progressing Towards Goal  Goal: Treatments/Interventsions/Procedures  Outcome: Progressing Towards Goal  Goal: *Blood Glucose 80 to 180 md/dl  Outcome: Progressing Towards Goal     Problem: Diabetes Maintenance:Discharge Outcomes  Goal: *Describes follow-up/return visits to physicians  Outcome: Progressing Towards Goal  Goal: *Blood glucose at patient's target range or approaching  Outcome: Progressing Towards Goal  Goal: *Aware of nutrition guidelines  Outcome: Progressing Towards Goal  Goal: *Verbalizes information about medication  Description: Verbalizes name, dosage, time, side effects, and number of days to  continue medications. Outcome: Progressing Towards Goal  Goal: *Describes goals, rules, symptoms, and treatments  Description: Describes blood glucose goals, monitoring, sick day rules,  hypo/hyperglycemia prevention, symptoms, and treatment  Outcome: Progressing Towards Goal  Goal: *Describes available outpatient diabetes resources and support systems  Outcome: Progressing Towards Goal     Problem: Diabetes Self-Management  Goal: *Disease process and treatment process  Description: Define diabetes and identify own type of diabetes; list 3 options for treating diabetes. Outcome: Progressing Towards Goal  Goal: *Incorporating nutritional management into lifestyle  Description: Describe effect of type, amount and timing of food on blood glucose; list 3 methods for planning meals. Outcome: Progressing Towards Goal  Goal: *Incorporating physical activity into lifestyle  Description: State effect of exercise on blood glucose levels.   Outcome: Progressing Towards Goal  Goal: *Developing strategies to promote health/change behavior  Description: Define the ABC's of diabetes; identify appropriate screenings, 77 [facility] F [facility]. Patient unable to contribute to history. Had labs drawn this afternoon and reportedly has a Hgb of 6. Patient does not have a vaughn catheter. No report of fever, vomiting, bleeding. Receiving IV Zosyn through PICC line rt arm    Ativan Dose adjusted for agitation  2 diff GI MDs notes reviewed - not a candidate for EGD  on TF  on Hep sq dvt p  GI follow up - serial Hgb  ct imaging reviewed - poss PNA -   Midodrine added -   vs noted  HD reviewed       HCP  Pt is full code  CCM and Cardio notes reviewed  lives in SNF  vent dep -   end stage dementia  trach and peg  HOB elev  asp prec  oral hygiene  skin care  assist with all needs - ADL  work up in progress   schedule and personal plan for screenings. Outcome: Progressing Towards Goal  Goal: *Using medications safely  Description: State effect of diabetes medications on diabetes; name diabetes medication taking, action and side effects. Outcome: Progressing Towards Goal  Goal: *Monitoring blood glucose, interpreting and using results  Description: Identify recommended blood glucose targets  and personal targets. Outcome: Progressing Towards Goal  Goal: *Prevention, detection, treatment of acute complications  Description: List symptoms of hyper- and hypoglycemia; describe how to treat low blood sugar and actions for lowering  high blood glucose level. Outcome: Progressing Towards Goal  Goal: *Prevention, detection and treatment of chronic complications  Description: Define the natural course of diabetes and describe the relationship of blood glucose levels to long term complications of diabetes.   Outcome: Progressing Towards Goal  Goal: *Developing strategies to address psychosocial issues  Description: Describe feelings about living with diabetes; identify support needed and support network  Outcome: Progressing Towards Goal  Goal: *Patient Specific Goal (EDIT GOAL, INSERT TEXT)  Outcome: Progressing Towards Goal     Problem: Patient Education: Go to Patient Education Activity  Goal: Patient/Family Education  Outcome: Progressing Towards Goal     Problem: Patient Education: Go to Patient Education Activity  Goal: Patient/Family Education  Outcome: Progressing Towards Goal

## 2021-12-12 NOTE — PROGRESS NOTE ADULT - SUBJECTIVE AND OBJECTIVE BOX
BREA [facility]EC[facility]    Allergies    codeine (Hives)    Intolerances        PAST MEDICAL & SURGICAL HISTORY:  Dementia of frontal lobe type    Aphasic stroke    Diabetes mellitus    Respiratory failure    Hypertension    GERD (gastroesophageal reflux disease)    Constipation    Respiratory failure    CVA (cerebral vascular accident)    HTN (hypertension)    DM (diabetes mellitus)    Advanced dementia    COVID-19 virus detected    Quadriplegia    Pneumonia    Type II diabetes mellitus    Hx of appendectomy    Gastrostomy in place    Tracheostomy in place    Tracheostomy tube present    Feeding by G-tube        FAMILY HISTORY:  No pertinent family history in first degree relatives        Home Medications:  albuterol 90 mcg/inh inhalation aerosol: 2 puff(s) inhaled every 6 hours (08 Dec 2021 16:51)  Bacid (LAC) oral tablet: 2 tab(s) by gastrostomy tube once a day (08 Dec 2021 16:51)  Carafate 1 g/10 mL oral suspension: 10 milliliter(s) by gastrostomy tube 4 times a day (before meals and at bedtime) for 14 days (Started 6/4/21) (08 Dec 2021 16:51)  chlorhexidine 0.12% mucous membrane liquid: 15 milliliter(s) mucous membrane 2 times a day (08 Dec 2021 16:51)  insulin lispro 100 units/mL injectable solution:  injectable; sliding scale coverage  (08 Dec 2021 16:51)  ipratropium-albuterol 0.5 mg-2.5 mg/3 mL inhalation solution: 3 milliliter(s) inhaled 4 times a day (08 Dec 2021 16:51)  Lantus 100 units/mL subcutaneous solution: 36 unit(s) subcutaneous once a day (at bedtime) (08 Dec 2021 16:51)  LORazepam 1 mg oral tablet: 1 tab(s) by gastrostomy tube 3 times a day, As Needed (08 Dec 2021 16:51)  methocarbamol: 250 milligram(s) by gastrostomy tube 2 times a day (08 Dec 2021 16:51)  pantoprazole 40 mg oral granule, delayed release: 40 milligram(s) by gastrostomy tube 2 times a day (08 Dec 2021 16:51)  polyethylene glycol 3350 oral powder for reconstitution: 17 gram(s) orally every 12 hours (08 Dec 2021 16:51)  ProAir HFA 90 mcg/inh inhalation aerosol: 2 puff(s) inhaled every 6 hours (08 Dec 2021 16:51)  senna 8.6 mg oral tablet: 3 tab(s) by gastrostomy tube once a day (at bedtime) (08 Dec 2021 16:51)  simethicone 80 mg oral tablet, chewable: 1 tab(s) orally every 6 hours (08 Dec 2021 16:51)  Tylenol 325 mg oral tablet: 2 tab(s) by gastrostomy tube once a day; 60 minutes prior to dressing change  (08 Dec 2021 16:51)  Zosyn 3 g-0.375 g/50 mL intravenous solution: intravenous every 8 hours (08 Dec 2021 16:51)      MEDICATIONS  (STANDING):  ALBUTerol    90 MICROgram(s) HFA Inhaler 2 Puff(s) Inhalation every 6 hours  chlorhexidine 0.12% Liquid 15 milliLiter(s) Oral Mucosa every 12 hours  dextrose 40% Gel 15 Gram(s) Oral once  dextrose 5%. 1000 milliLiter(s) (50 mL/Hr) IV Continuous <Continuous>  dextrose 5%. 1000 milliLiter(s) (100 mL/Hr) IV Continuous <Continuous>  dextrose 50% Injectable 25 Gram(s) IV Push once  dextrose 50% Injectable 12.5 Gram(s) IV Push once  dextrose 50% Injectable 25 Gram(s) IV Push once  glucagon  Injectable 1 milliGRAM(s) IntraMuscular once  heparin   Injectable 5000 Unit(s) SubCutaneous every 12 hours  insulin lispro (ADMELOG) corrective regimen sliding scale   SubCutaneous every 6 hours  methocarbamol 250 milliGRAM(s) Oral two times a day  midodrine 5 milliGRAM(s) Oral every 8 hours  pantoprazole  Injectable 40 milliGRAM(s) IV Push every 12 hours  piperacillin/tazobactam IVPB.. 3.375 Gram(s) IV Intermittent every 8 hours  polyethylene glycol 3350 17 Gram(s) Oral every 12 hours  senna 2 Tablet(s) Oral at bedtime  simethicone 80 milliGRAM(s) Chew every 6 hours  sodium chloride 0.9%. 1000 milliLiter(s) (65 mL/Hr) IV Continuous <Continuous>  sucralfate suspension 1 Gram(s) Enteral Tube every 6 hours    MEDICATIONS  (PRN):  acetaminophen     Tablet .. 650 milliGRAM(s) Oral every 6 hours PRN Temp greater or equal to 38C (100.4F), Mild Pain (1 - 3)  LORazepam   Injectable 1 milliGRAM(s) IV Push every 4 hours PRN Agitation      Diet, NPO with Tube Feed:   Tube Feeding Modality: Gastrostomy  Glucerna 1.5 Horacio  Total Volume for 24 Hours (mL): 1000  Continuous  Starting Tube Feed Rate mL per Hour: 20  Increase Tube Feed Rate by (mL): 10     Every 6 hours  Until Goal Tube Feed Rate (mL per Hour): 50  Tube Feed Duration (in Hours): 20  Tube Feed Start Time: 17:00 (12-09-21 @ 16:49) [Active]          Vital Signs Last 24 Hrs  T(C): 36.8 (12 Dec 2021 08:00), Max: 37.4 (11 Dec 2021 20:08)  T(F): 98.3 (12 Dec 2021 08:00), Max: 99.3 (11 Dec 2021 20:08)  HR: 88 (12 Dec 2021 09:00) (58 - 96)  BP: 127/53 (12 Dec 2021 08:00) (103/64 - 160/63)  BP(mean): 76 (12 Dec 2021 08:00) (64 - 88)  RR: 20 (12 Dec 2021 09:00) (15 - 36)  SpO2: 97% (12 Dec 2021 09:00) (93% - 100%)      12-11-21 @ 07:01  -  12-12-21 @ 07:00  --------------------------------------------------------  IN: 2250 mL / OUT: 500 mL / NET: 1750 mL        Mode: AC/ CMV (Assist Control/ Continuous Mandatory Ventilation), RR (machine): 18, TV (machine): 400, FiO2: 40, PEEP: 5, ITime: 1, MAP: 12, PIP: 37      LABS:                        9.1    5.82  )-----------( 235      ( 12 Dec 2021 06:29 )             31.5     12-12    141  |  105  |  35<H>  ----------------------------<  155<H>  4.1   |  28  |  1.13    Ca    8.0<L>      12 Dec 2021 06:29  Phos  2.2     12-12  Mg     2.1     12-12    TPro  7.0  /  Alb  2.0<L>  /  TBili  0.5  /  DBili  x   /  AST  28  /  ALT  32  /  AlkPhos  221<H>  12-11              WBC:  WBC Count: 5.82 K/uL (12-12 @ 06:29)  WBC Count: 6.98 K/uL (12-11 @ 09:39)  WBC Count: 13.27 K/uL (12-10 @ 08:38)  WBC Count: 9.43 K/uL (12-09 @ 20:48)  WBC Count: 8.82 K/uL (12-09 @ 07:09)  WBC Count: 6.92 K/uL (12-08 @ 23:10)  WBC Count: 8.30 K/uL (12-08 @ 20:11)  WBC Count: 12.18 K/uL (12-08 @ 11:58)      MICROBIOLOGY:  RECENT CULTURES:  12-11 .Sputum Sputum XXXX   Moderate polymorphonuclear leukocytes per low power field  Rare Squamous epithelial cells per low power field  Numerous Gram Negative Rods seen per oil power field   Few Pseudomonas aeruginosa  Moderate Serratia marcescens  Moderate Stenotrophomonas maltophilia  Normal Respiratory Elvira absent    12-08 Clean Catch Clean Catch (Midstream) XXXX XXXX   <10,000 CFU/mL Normal Urogenital Elvira    12-08 .Blood Blood-Peripheral XXXX XXXX   No growth to date.                    Sodium:  Sodium, Serum: 141 mmol/L (12-12 @ 06:29)  Sodium, Serum: 137 mmol/L (12-11 @ 09:40)  Sodium, Serum: 136 mmol/L (12-10 @ 08:38)  Sodium, Serum: 131 mmol/L (12-09 @ 07:09)  Sodium, Serum: 129 mmol/L (12-08 @ 23:10)  Sodium, Serum: 127 mmol/L (12-08 @ 11:58)      1.13 mg/dL 12-12 @ 06:29  1.22 mg/dL 12-11 @ 09:40  1.47 mg/dL 12-10 @ 08:38  1.52 mg/dL 12-09 @ 07:09  1.65 mg/dL 12-08 @ 23:10  2.00 mg/dL 12-08 @ 11:58      Hemoglobin:  Hemoglobin: 9.1 g/dL (12-12 @ 06:29)  Hemoglobin: 9.4 g/dL (12-11 @ 09:39)  Hemoglobin: 9.3 g/dL (12-10 @ 08:38)  Hemoglobin: 8.7 g/dL (12-09 @ 20:48)  Hemoglobin: 8.6 g/dL (12-09 @ 07:09)  Hemoglobin: 9.7 g/dL (12-08 @ 23:10)  Hemoglobin: 8.2 g/dL (12-08 @ 20:11)  Hemoglobin: 5.2 g/dL (12-08 @ 11:58)      Platelets: Platelet Count - Automated: 235 K/uL (12-12 @ 06:29)  Platelet Count - Automated: 224 K/uL (12-11 @ 09:39)  Platelet Count - Automated: 232 K/uL (12-10 @ 08:38)  Platelet Count - Automated: 183 K/uL (12-09 @ 20:48)  Platelet Count - Automated: 153 K/uL (12-09 @ 07:09)  Platelet Count - Automated: 146 K/uL (12-08 @ 23:10)  Platelet Count - Automated: 132 K/uL (12-08 @ 20:11)  Platelet Count - Automated: 160 K/uL (12-08 @ 11:58)      LIVER FUNCTIONS - ( 11 Dec 2021 09:40 )  Alb: 2.0 g/dL / Pro: 7.0 g/dL / ALK PHOS: 221 U/L / ALT: 32 U/L DA / AST: 28 U/L / GGT: x                 RADIOLOGY & ADDITIONAL STUDIES:      MICROBIOLOGY:  RECENT CULTURES:  12-11 .Sputum Sputum XXXX   Moderate polymorphonuclear leukocytes per low power field  Rare Squamous epithelial cells per low power field  Numerous Gram Negative Rods seen per oil power field   Few Pseudomonas aeruginosa  Moderate Serratia marcescens  Moderate Stenotrophomonas maltophilia  Normal Respiratory Elvira absent    12-08 Clean Catch Clean Catch (Midstream) XXXX XXXX   <10,000 CFU/mL Normal Urogenital Elvira    12-08 .Blood Blood-Peripheral XXXX XXXX   No growth to date.

## 2021-12-12 NOTE — DISCHARGE NOTE PROVIDER - ATTENDING DISCHARGE PHYSICAL EXAMINATION:
PHYSICAL EXAM:  GENERAL: chronically ill appearing, emaciated, NAD, obtunded  HEAD:  atraumatic  EYES: conjunctiva clear  NECK: +trach in place, clean  RESPIRATORY:  coarse mechanical breath sounds, no gross crackles or rales (+)trach, vent  CARDIOVASCULAR:  regular rate and rhythm, no murmurs or rubs or gallops, 2+ peripheral pulses  GASTROINTESTINAL:  soft, clean and dry as well, mildly distended, PEG tube in place c/d/i  EXTREMITIES: no clubbing or cyanosis or edema  MUSCULOSKELETAL:  +diffuse contractures in all joints  NERVOUS SYSTEM: does not respond to pain

## 2021-12-12 NOTE — CONSULT NOTE ADULT - CONSULT REQUESTED DATE/TIME
09-Dec-2021 09:25
08-Dec-2021 11:51
08-Dec-2021
08-Dec-2021 23:49
10-Dec-2021 16:28
12-Dec-2021 11:02
08-Dec-2021 12:00
09-Dec-2021 10:56

## 2021-12-12 NOTE — PROGRESS NOTE ADULT - SUBJECTIVE AND OBJECTIVE BOX
Neurology follow up note    BREA [id number]      Interval History:    Patient resting in bed     Allergies    codeine (Hives)    Intolerances        MEDICATIONS    acetaminophen     Tablet .. 650 milliGRAM(s) Oral every 6 hours PRN  ALBUTerol    90 MICROgram(s) HFA Inhaler 2 Puff(s) Inhalation every 6 hours  chlorhexidine 0.12% Liquid 15 milliLiter(s) Oral Mucosa every 12 hours  dextrose 40% Gel 15 Gram(s) Oral once  dextrose 5%. 1000 milliLiter(s) IV Continuous <Continuous>  dextrose 5%. 1000 milliLiter(s) IV Continuous <Continuous>  dextrose 50% Injectable 25 Gram(s) IV Push once  dextrose 50% Injectable 12.5 Gram(s) IV Push once  dextrose 50% Injectable 25 Gram(s) IV Push once  glucagon  Injectable 1 milliGRAM(s) IntraMuscular once  heparin   Injectable 5000 Unit(s) SubCutaneous every 12 hours  insulin lispro (ADMELOG) corrective regimen sliding scale   SubCutaneous every 6 hours  LORazepam   Injectable 1 milliGRAM(s) IV Push every 4 hours PRN  methocarbamol 250 milliGRAM(s) Oral two times a day  midodrine 5 milliGRAM(s) Oral every 8 hours  pantoprazole  Injectable 40 milliGRAM(s) IV Push every 12 hours  piperacillin/tazobactam IVPB.. 3.375 Gram(s) IV Intermittent every 8 hours  polyethylene glycol 3350 17 Gram(s) Oral every 12 hours  senna 2 Tablet(s) Oral at bedtime  simethicone 80 milliGRAM(s) Chew every 6 hours  sodium chloride 0.9%. 1000 milliLiter(s) IV Continuous <Continuous>  sucralfate suspension 1 Gram(s) Enteral Tube every 6 hours              Vital Signs Last 24 Hrs  T(C): 36.9 (11 Dec 2021 23:57), Max: 37.4 (11 Dec 2021 20:08)  T(F): 98.5 (11 Dec 2021 23:57), Max: 99.3 (11 Dec 2021 20:08)  HR: 58 (12 Dec 2021 06:00) (58 - 96)  BP: 107/53 (12 Dec 2021 06:00) (103/64 - 160/63)  BP(mean): 70 (12 Dec 2021 06:00) (64 - 88)  RR: 18 (12 Dec 2021 06:00) (15 - 36)  SpO2: 100% (12 Dec 2021 06:00) (93% - 100%)    REVIEW OF SYSTEMS:  Could not be obtained secondary to the patient being nonverbal.    PHYSICAL EXAMINATION:    HEENT:  Head:  Normocephalic.  Eyes:  No scleral icterus.  Ears:  Hard to evaluate secondary to the patient being nonverbal.  NECK:  Had increased tone, tracheostomy was in place.  RESPIRATORY:  Decreased breath sounds bilaterally.  CARDIOVASCULAR:  S1 and S2 heard.  ABDOMEN:  Soft and nontender.  EXTREMITIES:  No clubbing or cyanosis was noted.      NEUROLOGIC:  The patient was resting in bed with eyes open.  Upon stimulation of the patient, her eyes would roll up, with subtle shaking.  Speech:  Nonverbal.  Tone in all four extremities increased.  Bilateral upper extremities were in a flexed position.  Bilateral lower extremities were in the straight position.                   LABS:  CBC Full  -  ( 12 Dec 2021 06:29 )  WBC Count : 5.82 K/uL  RBC Count : 3.66 M/uL  Hemoglobin : 9.1 g/dL  Hematocrit : 31.5 %  Platelet Count - Automated : 235 K/uL  Mean Cell Volume : 86.1 fl  Mean Cell Hemoglobin : 24.9 pg  Mean Cell Hemoglobin Concentration : 28.9 gm/dL  Auto Neutrophil # : x  Auto Lymphocyte # : x  Auto Monocyte # : x  Auto Eosinophil # : x  Auto Basophil # : x  Auto Neutrophil % : x  Auto Lymphocyte % : x  Auto Monocyte % : x  Auto Eosinophil % : x  Auto Basophil % : x      12-12    141  |  105  |  35<H>  ----------------------------<  155<H>  4.1   |  28  |  1.13    Ca    8.0<L>      12 Dec 2021 06:29  Phos  2.2     12-12  Mg     2.1     12-12    TPro  7.0  /  Alb  2.0<L>  /  TBili  0.5  /  DBili  x   /  AST  28  /  ALT  32  /  AlkPhos  221<H>  12-11    Hemoglobin A1C:     LIVER FUNCTIONS - ( 11 Dec 2021 09:40 )  Alb: 2.0 g/dL / Pro: 7.0 g/dL / ALK PHOS: 221 U/L / ALT: 32 U/L DA / AST: 28 U/L / GGT: x           Vitamin B12         RADIOLOGY      ANALYSIS AND PLAN:  This is a 76-year-old with episodes of altered mental status and a history of shaking.  For altered mental status, metabolic encephalopathy secondary to underlying infectious type process and possibly underlying pneumonia.  For episodes of abnormal movements, as per my conversation with the spouse in the past, these were nonepileptic in nature.  As per my conversation with him in the past, does not want any antiseizure medications.  The patient was tried before in the past on muscle relaxants to see if that will help with tone, with no significant changes.  For history of diabetes, strict control of blood sugars.  events noted   monition  respiratory status as needed     The spouse's name is Marciano, his telephone number is [phone] 12/10    Greater than 34 minutes of time was spent with the patient, plan of care, reviewing data, and speaking to the multidisciplinary healthcare team with greater than 50% of that time in counseling and care coordination.    Thank you for the courtesy of this consultation.

## 2021-12-12 NOTE — CONSULT NOTE ADULT - ASSESSMENT
75F with chronic resp failure - vent dependent, hx of subarachnoid hemorrhage, hx of cardiac arrest, dementia s/p PEG, HTN, DM2 on insulin, hx of CVA, GERD, COPD, admission here from 9/25 to 10/4 and 11/7-11/11 for respiratory failure/sepsis possibly 2/2 aspiration vs vent associated PNA who presents from [facility] for abnormal labs.  Patient does not contribute to history. In the ED, patient's initial triage vitals were BP 88/37, HR 81, RR 18, 100% on vent and T 99 F.  Labs showed leukocytosis of 12.18, HGB 5.2, BUN/Cr 89/2.00. CXR: Tracheostomy cannula reidentified in position. No change heart mediastinum. Widespread bilateral airspace disease slightly improved.  correlate for pulmonary edema and/or infection. No significant pleural effusion. No pneumothorax. Patient's hand projects over portion of the right base. CT chest and A/P pending  (08 Dec 2021 15:50)      ACUTE RENAL FAILURE:   Serum creatinine is  at 1.65   There is no progression . No uremic symptoms  No evidence of anemia .  Fluid status stable.  Will continue to avoid nephrotoxic drugs.  Patient remains asymptomatic.   Continue current therapy.  hold  diuretic.  hold   ACE inhibitor.  hold   ARB.  Additional evaluation:   ECG,    echocardiogram,     CXR,  will obtained recent   renal ultrasound to evalaute kidney size and possible stones ,     Admit for septic workup and ID evaluation,send blood and urine cx,serial lactate levels,monitor vitals closley,ivfs hydration,monitor urine output and renal profile,iv abx as per id cons      
75F with chronic resp failure - vent dependent, hx of subarachnoid hemorrhage, hx of cardiac arrest, dementia s/p PEG, HTN   a/w acute renal failure   eval re anemia   transfuse to hgb >7.5, monitor stools re signs of gi blood loss    lavage peg to evaluate gastric contents    IV PPI BID    would not anticipate scope unless signs of active/overt gi hemorrhage w/ drops in hgb   hold ac/antiplatelet meds   monitored bed
75F with chronic resp failure - vent dependent, hx of subarachnoid hemorrhage, hx of cardiac arrest, dementia s/p PEG, HTN, DM2 on insulin, hx of CVA, GERD, COPD, admission here from 9/25 to 10/4 and 11/7-11/11 for respiratory failure/sepsis possibly 2/2 aspiration vs vent associated PNA who presents from [facility] for abnormal labs.  Patient does not contribute to history. In the ED, patient's initial triage vitals were BP 88/37, HR 81, RR 18, 100% on vent and T 99 F.  Labs showed leukocytosis of 12.18, HGB 5.2, BUN/Cr 89/2.00.  has received 2 units PRBC since admission  most likely cause of anemia is related to chronic disease however it is possible has been complicated by intermittent GI blood loss.  will check fe stores    Recommendations  1.  follow CBC and transfuse as indicated  2.  check fe stores  3.  CBC is stable to return to facility  4.  would check CBC weekly if possible and arrange for ambulatory transfusions as needed.  Expect patient will remain transfusion dependent.  Would not treat with ESAs at present   5.  further heme recommendations pending above
Anemia (Second Opinion for EGD)  vent dependent  no signs of active GIB  CBC stable  pt has multiple comorbid conditions not a candidate for egd   resume feeds  
The patient is a 76 year old female with a history of HTN, DM, CVA, dementia, chronic respiratory failure s/p trach, PEG, cardiac arrest who presents from NH with anemia.    Plan:  - Check ECG  - Check CBC - transfuse as needed  - Hold aspirin  - Recent echo with normal LV systolic function, mild pulm HTN  - Mechanical ventilation  - Overall poor prognosis  
75F with chronic resp failure - vent dependent, hx of subarachnoid hemorrhage, hx of cardiac arrest, dementia s/p PEG, HTN, DM2 on insulin, hx of CVA, GERD, COPD, admission here from 9/25 to 10/4 and 11/7-11/11 for respiratory failure/sepsis possibly 2/2 aspiration vs vent associated PNA who presents from [facility] w leukocytosis of 12.18, and concerns for repeat aspiration PNA    CT-Distal airways impaction and multifocal pneumonia, possibly related to aspiration. Most of the opacities are either new or unchanged since prior study.     RECOMMENDATIONS  Compelling for recurrent aspiration PNA, based on prior MRSA-neg, carbapenem resistant pseudomonas in sputum will dc Vanco for now and rec continue Zosyn, recs to follow based on any new micro and clinical course.    Thank you for consulting us and involving us in the management of this most interesting and challenging case.  We will follow along in the care of this patient. Please call us at [phone] or text me directly on my cell# at [phone] with any concerns.    
      CHAPINCITO GUIDRY 75 f [facility] S 12/8/2021   DR ILIANA KEMP     Initial evaluation/Pulmonary Critical Care consultation requested on 12/8/2021  by Dr GILL  from Dr Sandoval   Patient examined chart reviewed    HOSPITAL ADMISSION   [facility]if information available[facility] 12/8/2021   DR ILIANA KEMP     REVIEW OF SYMPTOMS      Able to give (reliable) ROS  NO     PHYSICAL EXAM    HEENT Unremarkable  atraumatic   RESP Fair air entry EXP prolonged    Harsh breath sound Resp distres mild   CARDIAC S1 S2 No S3     NO JVD    ABDOMEN SOFT BS PRESENT NOT DISTENDED No hepatosplenomegaly   PEDAL EDEMA present No calf tenderness  NO rash                                      PATIENT PRESENTATION/COURSE.  76 year old female with a history of HTN, DM, CVA, dementia, chronic respiratory failure s/p trach, PEG, cardiac arrest who presents from NH with abnormal labs. She was found to have a hemoglobin of 6. No further history could be obtained from patient.  PAST ADMISSIONS   11/7-11/11/2021 11/7 Hypoglycemia   9/25-10/4/2021 Asp pneum  9/7-9/15/2021 9/7 cac   6/15-6/23/2021   PAST AUGUST.     9/28/2021 sputum 9/28 showed crp   9/26 sp stenotrophomonas pseucomonas serratia  9/8/2021 ECHO n lvsf mild dd pasp 51   HOME MEDS INCLUDE  hpsc lorazepam 1.3 protonix 40.2       ________________  Age DOA CC.  76 year old female with a history of HTN, DM, CVA, dementia, chronic respiratory failure s/p trach, PEG, cardiac arrest who presented 12/8/2021 from NH with abnormal labs. She was found to have a hemoglobin of 6. No further history could be obtained from patient.  Pulm crit care consulted 12/8/2021   ________  PROBLEM LIST.  sp PEG pmh  SP Trach pmh        POSSIBLE SEPSIS poa 12/8/2021 Given zosyn and Vanco in er  HYPOTENSION poa. 12/8/2021 11a 2l ns given in er   VENT DEPENDENT RESP FAILURE pmh.   COPD pmh.   SEVERE ANEMIA poa 12/8/2021 Hb 5.2 12/8/2021 2 u prbc ordered in er   GERD pmh.  HYPONATREMIA poa. 12/8/2021 Na 127   RYAN poa. 12/8/2021 Cr 2   SEIZURES pmh Takes lorazepam 1.3   ANOXIC ENCEPHALOPATHY pmh      _____                            GOC.12/8/2021 Full code     PATIENT DATA   VITALS/PO/IO/VENT/DRIPS.     12/8/2021 99f 93/35 23       BEST PRACTICE ISSUES.                                                  HEAD OF BED ELEVATION. Yes  DVT PROPHYLAXIS.      12/8/2021 No pharmac pplx as pt possibly has abla on admission 12/8/2021                                    SQUIRES PROPHYLAXIS.    12/8/2021 IV protonix 40.2                                                                                      DIET.            12/8/2021 npo till gi bleed ruled out              INFECTION PROPHYLAXIS.    12/8/2021 chlorhexidine     GENERAL ISSUES                                       ALLERGY.         codeine                   WEIGHT.           12/8/2021 61                           BMI.                   12/8/2021 22  SPEECH SWALLOW RECOMMENDATIONS.   IV FLUIDS.   12/8/2021 iv lr 100                            ASSESSMENT/RECOMMENDATIONS.    RESP.   VENT DEPENDENT RESP FAILURE pmh.   monitor po abg  optimize gas exchange     HEMODYNAMICS.   HYPOTENSION poa.   12/8/2021 11a 2l ns given in er   target map 65 (+)     INFECTION.   POSSIBLE SEPSIS poa   12/8/2021 Given zosyn and Vanco in er  12/8/2021 Cont zosyn till asp pneum excluded by ct ch and cult        COPD pmh.   Start prov atrov via trach    SEVERE ANEMIA poa   12/8/2021 Hb 5.2   12/8/2021 2 u prbc ordered in er   Monitor Hb serially   gi eval  ppi   npo  check ctap ro bleed    GERD pmh.  ppi    HYPONATREMIA poa.   12/8/2021 Na 127   iv rl  monitor     RYAN poa.   12/8/2021 Cr 2   iv fluids  monitor    SEIZURES pmh   Takes lorazepam 1.3       OVERALL ASSESSMENT/PLAN  Anoxic encephalopathy vdrf peg trach patient age 76 full code here with low Hb hypotension and possible asp pneum admitted 12/8/2021      TIME SPENT   Over 55 minutes aggregate critical care time spent on encounter; activities included   direct patient care, counseling and/or coordinating care reviewing notes, lab data/ imaging , discussion with multidisciplinary team/ patient  /family and explaining in detail risks, benefits, alternatives  of the recommendations     CHAPINCITO GUIDRY 75 f [facility] S 12/8/2021   DR ILIANA KEMP   
75 [place] [facility] [place]. Patient unable to contribute to history. Had labs drawn this afternoon and reportedly has a Hgb of 6. Patient does not have a vaughn catheter. No report of fever, vomiting, bleeding. Receiving IV Zosyn through PICC line rt arm       HCP  Pt is full code  CCM and Cardio notes reviewed  lives in SNF  vent dep -   end stage dementia  trach and peg  HOB elev  asp prec  oral hygiene  skin care  assist with all needs - ADL  work up in progress

## 2021-12-12 NOTE — DISCHARGE NOTE PROVIDER - CARE PROVIDER_API CALL
Arturo Olivas; PhD)  Infectious Disease; Internal Medicine  [address] [place] [id number]  Phone: [phone]  Fax: [phone]  Follow Up Time:     Ameya Solares  HEMATOLOGY  [address], [address]  [place] [address]  Phone: [phone]  Fax: [phone]  Follow Up Time: 1 week    Rosendo Nieves)  Gastroenterology; Internal Medicine  [address], [address]  [place] [address]  Phone: [phone]  Fax: [phone]  Follow Up Time: Paul Cota  INTERNAL MEDICINE  [address], [address]  [place] [address]  Phone: [phone]  Fax: [phone]  Follow Up Time:

## 2021-12-12 NOTE — PROGRESS NOTE ADULT - ASSESSMENT
CHAPINCITO GUIDRY 75 f [facility] S 12/8/2021   DR ILIANA KEMP     REVIEW OF SYMPTOMS      Able to give (reliable) ROS  NO     PHYSICAL EXAM    HEENT Unremarkable  atraumatic   RESP Fair air entry EXP prolonged    Harsh breath sound Resp distres mild   CARDIAC S1 S2 No S3     NO JVD    ABDOMEN SOFT BS PRESENT NOT DISTENDED No hepatosplenomegaly   PEDAL EDEMA present No calf tenderness  NO rash       ________________  Age DOA CC.  76 year old female with a history of Pneumonia  HTN, DM, CVA, dementia, chronic respiratory failure s/p trach, PEG, cardiac arrest who presented 12/8/2021 from NH with abnormal labs. She was found to have a hemoglobin of 6. No further history could be obtained from patient.  Pulm crit care consulted 12/8/2021   ________  PROBLEM LIST.  PNEUMONIA poa 12/8  VENT MANAGEMENT. VDRF poa   COPD.  HYPOTENSION 12/8  GI BLEED poa 12/8  SEVERE ANEMIA poa 12/8 Hb 5.2  TRANSFUSION 2 U 12/8  HYPONATREMIA poa. 12/8/2021 Na 127   RYAN poa. 12/8/2021 Cr 2   REID Placed 12/8 for io monitoring  NO IV ACCESS 12/10/2021       _____                            GOC.12/8/2021 Full code   COVID STATUS. 12/8/2021 scv2 (-)   ICU STAY. Admitted ICU 12/8/2021   ABIO.   12/8 zosyn x 7d Pneum     PATIENT DATA   VITALS/PO/IO/VENT/DRIPS.   12/12/2021 60 120/60   12/12/2021 18/400/5/.4        BEST PRACTICE ISSUES.                                                  HEAD OF BED ELEVATION. Yes  DVT PROPHYLAXIS.      12/10 hpsc   12/8/2021 No pharmac pplx as pt possibly has abla on admission 12/8/2021                                    SQUIRES PROPHYLAXIS.      12/8/2021 IV protonix 40.2       12/9/2021 carafate 1.4                                                  DIET.            12/9/2021 glucerna 1.5 1000 12/9 12/8/2021 npo till gi bleed ruled out              INFECTION PROPHYLAXIS.    12/8/2021 chlorhexidine 0.12% bid     GENERAL ISSUES                                       ALLERGY.         codeine                   WEIGHT.           12/8/2021 61                           BMI.                   12/8/2021 22  SPEECH SWALLOW RECOMMENDATIONS.   IV FLUIDS.   12/12/2021 ns 50 x 2d      ASSESSMENT/RECOMMENDATIONS.    RESP.   Trach  SP Trach pmh     VENT MANAGEMENT.  VENT DEPENDENT RESP FAILURE pmh.  Clinically stable on current settings   12/10 dw  He wants us to try weaning  12/11 will try simv in am     COPD pmh.   12/9/2021 albuterol hf.4   12/9/2021 Spiriva   Cont Rx       ASSESSMENT/RECOMMENDATIONS.    RESP.   Trach  SP Trach pmh     VENT MANAGEMENT.  VENT DEPENDENT RESP FAILURE pmh.  Clinically stable on current settings   12/10 dw  He wants us to try weaning  12/11 will try simv in am     COPD pmh.   12/9/2021 albuterol hf.4   12/9/2021 Spiriva   Cont Rx      HEMODYNAMICS.   HYPOTENSION poa.   12/8/2021 11a 2l ns given in er   9/8/2021 ECHO n lvsf mild dd pasp 51   12/9/2021 Midodrine 5.3   BP has improvd   target map 65 (+)       INFECTION.   Pneumonia  poa 12/8/2021   w 12/9-12/11/2021 w 8.8 - 6.9   12/9 pr 6.7   12/8 urine c (-)   CXR 12/8 widespread airspace dis sl improvd cw 11/9/2021 12/8 ct cap distal airway impaction and mf pneum related to aspirat   Changes are either new or unchanged   Volume loss rll has improvd since 11/7/2021   Small l effsn   12/8 mrsa (-)   12/8 blod c (-)  12/8 urine c (-)   12/8 Zosyn x 7d     SEVERE ANEMIA poa   Hb 12/8-12/8-12/9-12/11-12/12/2021   Hb 5.2 -9.7-8.6 - 9.4-9.1  12/8/2021 Hb 5.2   12/8 ct cap no rp bleed No hydronephrosis   12/8/2021 2 u prbc ordered in er   Monitor Hb serially   Target Hb 7 (+)    GERD pmh.  On ppi     sp PEG pmh    GI BLEED poa 12/8 12/9/2021 SOB (+)   12/9/2021 Seen by Dr Nieves No intervention pplannd   12/8/2021 IV protonix 40.2       12/9/2021 carafate 1.4                12/9/2021 Dr BETH vinesed dvt pplx [facility]   12/11/2021 gi not planning any pproced      HYPONATREMIA poa.   12/8-12/9-12/11-12/11/2021 Na 127 -131 -137-141  Improving     RYAN poa.   12/8-12/9-12/11-12/11/2021 Cr 2-1.5 -1.2- 1.1  12/10 ns 65 x 2 d   Improving    SEIZURES pmh   Takes lorazepam 1.3       TIME SPENT   Over 36 minutes aggregate critical care time spent on encounter; activities included   direct patient care, counseling and/or coordinating care reviewing notes, lab data/ imaging , discussion with multidisciplinary team/ patient  /family and explaining in detail risks, benefits, alternatives  of the recommendations     CHAPINCITO GUIDRY 75 f [facility] S 12/8/2021   DR ILIANA KEMP

## 2021-12-12 NOTE — DISCHARGE NOTE PROVIDER - NSDCFUADDINST_GEN_ALL_CORE_FT
- Please make an appointment for follow up with your primary doctor within 1-3 days of discharge  - It is important to see your primary physician as well as the physicians listed below to perform a comprehensive medical review.  Call their offices for an appointment as soon as you leave the hospital.  You will also need to see them for renewal of your medications.  If you do not have a primary physician, contact the [facility] Physician Referral Service at [phone].  To obtain your results, you can access the [facility] Portal at [email or web address].   - Your medical issues appear to be stable at this time, but if your symptoms recur or worsen, contact your physicians and/or return to the hospital if necessary.    -If you encounter any issues or questions with your medication, call your physicians before stopping the medication.    - Patient or caretaker is responsible for making all appointments

## 2021-12-12 NOTE — DISCHARGE NOTE PROVIDER - CARE PROVIDERS DIRECT ADDRESSES
[email or web address],DirectAddress_Unknown,DirectAddress_Unknown,DirectAddress_Unknown

## 2021-12-12 NOTE — CONSULT NOTE ADULT - SUBJECTIVE AND OBJECTIVE BOX
Patient is a 76y old  Female who presents with a chief complaint of Anemia (12 Dec 2021 09:57)      HPI:  ***Patient with dementia and is not responsive.  Collateral information obtained from chart and notes.***    75F with chronic resp failure - vent dependent, hx of subarachnoid hemorrhage, hx of cardiac arrest, dementia s/p PEG, HTN, DM2 on insulin, hx of CVA, GERD, COPD, admission here from 9/25 to 10/4 and 11/7-11/11 for respiratory failure/sepsis possibly 2/2 aspiration vs vent associated PNA who presents from [facility] for abnormal labs.  Patient does not contribute to history. In the ED, patient's initial triage vitals were BP 88/37, HR 81, RR 18, 100% on vent and T 99 F.  Labs showed leukocytosis of 12.18, HGB 5.2, BUN/Cr 89/2.00. CXR: Tracheostomy cannula reidentified in position. No change heart mediastinum. Widespread bilateral airspace disease slightly improved.  correlate for pulmonary edema and/or infection. No significant pleural effusion. No pneumothorax. Patient's hand projects over portion of the right base. CT chest and A/P pending  (08 Dec 2021 15:50)       ROS:  Negative except for:    PAST MEDICAL & SURGICAL HISTORY:  Dementia of frontal lobe type    Aphasic stroke    Diabetes mellitus    Respiratory failure    Hypertension    GERD (gastroesophageal reflux disease)    Constipation    Respiratory failure    CVA (cerebral vascular accident)    HTN (hypertension)    DM (diabetes mellitus)    Advanced dementia    COVID-19 virus detected    Quadriplegia    Pneumonia    Type II diabetes mellitus    Hx of appendectomy    Gastrostomy in place    Tracheostomy in place    Tracheostomy tube present    Feeding by G-tube        SOCIAL HISTORY:    FAMILY HISTORY:  No pertinent family history in first degree relatives        MEDICATIONS  (STANDING):  ALBUTerol    90 MICROgram(s) HFA Inhaler 2 Puff(s) Inhalation every 6 hours  chlorhexidine 0.12% Liquid 15 milliLiter(s) Oral Mucosa every 12 hours  dextrose 40% Gel 15 Gram(s) Oral once  dextrose 5%. 1000 milliLiter(s) (50 mL/Hr) IV Continuous <Continuous>  dextrose 5%. 1000 milliLiter(s) (100 mL/Hr) IV Continuous <Continuous>  dextrose 50% Injectable 25 Gram(s) IV Push once  dextrose 50% Injectable 12.5 Gram(s) IV Push once  dextrose 50% Injectable 25 Gram(s) IV Push once  glucagon  Injectable 1 milliGRAM(s) IntraMuscular once  heparin   Injectable 5000 Unit(s) SubCutaneous every 12 hours  insulin lispro (ADMELOG) corrective regimen sliding scale   SubCutaneous every 6 hours  methocarbamol 250 milliGRAM(s) Oral two times a day  midodrine 5 milliGRAM(s) Oral every 8 hours  pantoprazole  Injectable 40 milliGRAM(s) IV Push every 12 hours  piperacillin/tazobactam IVPB.. 3.375 Gram(s) IV Intermittent every 8 hours  polyethylene glycol 3350 17 Gram(s) Oral every 12 hours  senna 2 Tablet(s) Oral at bedtime  simethicone 80 milliGRAM(s) Chew every 6 hours  sodium chloride 0.9%. 1000 milliLiter(s) (65 mL/Hr) IV Continuous <Continuous>  sucralfate suspension 1 Gram(s) Enteral Tube every 6 hours    MEDICATIONS  (PRN):  acetaminophen     Tablet .. 650 milliGRAM(s) Oral every 6 hours PRN Temp greater or equal to 38C (100.4F), Mild Pain (1 - 3)  LORazepam   Injectable 1 milliGRAM(s) IV Push every 4 hours PRN Agitation      Allergies    codeine (Hives)    Intolerances        Vital Signs Last 24 Hrs  T(C): 36.8 (12 Dec 2021 08:00), Max: 37.4 (11 Dec 2021 20:08)  T(F): 98.3 (12 Dec 2021 08:00), Max: 99.3 (11 Dec 2021 20:08)  HR: 88 (12 Dec 2021 09:00) (58 - 96)  BP: 127/53 (12 Dec 2021 08:00) (103/64 - 160/63)  BP(mean): 76 (12 Dec 2021 08:00) (64 - 88)  RR: 20 (12 Dec 2021 09:00) (15 - 36)  SpO2: 97% (12 Dec 2021 09:00) (93% - 100%)    PHYSICAL EXAM  General: intubated trached.   HEENT: clear oropharynx, anicteric sclera, pink conjunctivae  Neck: supple  CV: normal S1S2 with no murmur rubs or gallops  Lungs: clear to auscultation, no wheezes, no rhales  Abdomen: soft non-tender non-distended, no hepato/splenomegaly  Ext: no clubbing cyanosis or edema  Skin: no rashes and no petichiae  Neuro: intubated trached does not respond to verbal stimuli      LABS:    CBC Full  -  ( 12 Dec 2021 06:29 )  WBC Count : 5.82 K/uL  RBC Count : 3.66 M/uL  Hemoglobin : 9.1 g/dL  Hematocrit : 31.5 %  Platelet Count - Automated : 235 K/uL  Mean Cell Volume : 86.1 fl  Mean Cell Hemoglobin : 24.9 pg  Mean Cell Hemoglobin Concentration : 28.9 gm/dL  Auto Neutrophil # : x  Auto Lymphocyte # : x  Auto Monocyte # : x  Auto Eosinophil # : x  Auto Basophil # : x  Auto Neutrophil % : x  Auto Lymphocyte % : x  Auto Monocyte % : x  Auto Eosinophil % : x  Auto Basophil % : x    12-12    141  |  105  |  35<H>  ----------------------------<  155<H>  4.1   |  28  |  1.13    Ca    8.0<L>      12 Dec 2021 06:29  Phos  2.2     12-12  Mg     2.1     12-12    TPro  7.0  /  Alb  2.0<L>  /  TBili  0.5  /  DBili  x   /  AST  28  /  ALT  32  /  AlkPhos  221<H>  12-11              BLOOD SMEAR INTERPRETATION:    RADIOLOGY & ADDITIONAL STUDIES:

## 2021-12-12 NOTE — PROGRESS NOTE ADULT - ASSESSMENT
75F with chronic resp failure - vent dependent, hx of subarachnoid hemorrhage, hx of cardiac arrest, dementia s/p PEG, HTN, DM2 on insulin, hx of CVA, GERD, COPD, admission here from 9/25 to 10/4 and 11/7-11/11 for respiratory failure/sepsis possibly 2/2 aspiration vs vent associated PNA who presents from [facility] for abnormal labs.  Patient does not contribute to history. In the ED, patient's initial triage vitals were BP 88/37, HR 81, RR 18, 100% on vent and T 99 F.  Labs showed leukocytosis of 12.18, HGB 5.2, BUN/Cr 89/2.00. CXR: Tracheostomy cannula reidentified in position. No change heart mediastinum. Widespread bilateral airspace disease slightly improved.  correlate for pulmonary edema and/or infection. No significant pleural effusion. No pneumothorax. Patient's hand projects over portion of the right base. CT chest and A/P pending  (08 Dec 2021 15:50)      ACUTE RENAL FAILURE: sodium chloride 0.9%. 1000 milliLiter(s) (65 mL/Hr  Serum creatinine is improving    There is no progression . No uremic symptoms  No evidence of anemia .  Fluid status stable.  Will continue to avoid nephrotoxic drugs.  Patient remains asymptomatic.   Continue current therapy.    f/u blood and urine cx,serial lactate levels,monitor vitals roddy raymundo hydration,monitor urine output and renal profile,    hypotension midodrine 5 milliGRAM(s) Oral every 8 hours

## 2021-12-12 NOTE — DISCHARGE NOTE PROVIDER - NSDCCPCAREPLAN_GEN_ALL_CORE_FT
PRINCIPAL DISCHARGE DIAGNOSIS  Diagnosis: Anemia  Assessment and Plan of Treatment: Seen by 2 GI specialists and hematologist. No acute bleeding noted. Recommend checking CBC regularly and following up with hematology outpatient. May need routine transfusion outpatient      SECONDARY DISCHARGE DIAGNOSES  Diagnosis: Sepsis  Assessment and Plan of Treatment: Completed course of antibiotics.  Seen by Dr. Olivas. From an ID standpoint no further requirement for inpatient status for the management of ID issues. To schedule an outpatient ID follow up appointment please call Dr. Olivas's office at [phone]      Diagnosis: RYAN (acute kidney injury)  Assessment and Plan of Treatment: Improved with fluids and hydration. Repeat labs at rehab within 1-3 days of discharge. Continue with gentle hydration at [facility] either via PEG or IV as needed     PRINCIPAL DISCHARGE DIAGNOSIS  Diagnosis: Anemia  Assessment and Plan of Treatment: Seen by 2 GI specialists and hematologist. No acute bleeding noted. Recommend checking CBC regularly and following up with hematology outpatient. May need routine transfusion outpatient for HGB <7.0. Follow up with Dr. Solares. Continue Protonix and Carafate      SECONDARY DISCHARGE DIAGNOSES  Diagnosis: Sepsis  Assessment and Plan of Treatment: Completed course of antibiotics.  Seen by Dr. Olivas. From an ID standpoint no further requirement for inpatient status for the management of ID issues. To schedule an outpatient ID follow up appointment please call Dr. Olivas's office at [phone]      Diagnosis: RYAN (acute kidney injury)  Assessment and Plan of Treatment: Improved with fluids and hydration. Repeat labs at rehab within 1-3 days of discharge. Continue with gentle hydration at [facility] either via PEG or IV as needed    Diagnosis: PEG tube malfunction  Assessment and Plan of Treatment: PEG feeding tube replaced inpatient by GI     PRINCIPAL DISCHARGE DIAGNOSIS  Diagnosis: Seizures  Assessment and Plan of Treatment: Transfer to [facility] for cont EEG and further management      SECONDARY DISCHARGE DIAGNOSES  Diagnosis: Anemia  Assessment and Plan of Treatment: Seen by 2 GI specialists and hematologist. No acute bleeding noted. Recommend checking CBC regularly and following up with hematology outpatient. May need routine transfusion outpatient for HGB <7.0. Follow up with Dr. Solares. Continue Protonix and Carafate    Diagnosis: Sepsis  Assessment and Plan of Treatment: Completed course of antibiotics.  Seen by Dr. Olivas. From an ID standpoint no further requirement for inpatient status for the management of ID issues. To schedule an outpatient ID follow up appointment please call Dr. Olivas's office at [phone]      Diagnosis: Anemia  Assessment and Plan of Treatment: Seen by 2 GI specialists and hematologist. No acute bleeding noted. Recommend checking CBC regularly and following up with hematology outpatient. May need routine transfusion outpatient for HGB <7.0. Follow up with Dr. Solares. Continue Protonix and Carafate    Diagnosis: RYAN (acute kidney injury)  Assessment and Plan of Treatment: Improved with fluids and hydration. Repeat labs at rehab within 1-3 days of discharge. Continue with gentle hydration at [facility] either via PEG or IV as needed    Diagnosis: PEG tube malfunction  Assessment and Plan of Treatment: PEG feeding tube replaced inpatient by GI     PRINCIPAL DISCHARGE DIAGNOSIS  Diagnosis: Other abnormal involuntary movements  Assessment and Plan of Treatment: Noted to be provoked by agitation and movement. Non epileptic in nature. Evaluated by Neurology and EEG low yield.      SECONDARY DISCHARGE DIAGNOSES  Diagnosis: Sepsis  Assessment and Plan of Treatment: Completed course of antibiotics.  Seen by Dr. Olivas. From an ID standpoint no further requirement for inpatient status for the management of ID issues. To schedule an outpatient ID follow up appointment please call Dr. Olivas's office at [phone]      Diagnosis: RYAN (acute kidney injury)  Assessment and Plan of Treatment: Improved with fluids and hydration. Repeat labs at rehab within 1-3 days of discharge. Continue with gentle hydration at [facility] either via PEG or IV as needed    Diagnosis: PEG tube malfunction  Assessment and Plan of Treatment: PEG feeding tube replaced inpatient by GI    Diagnosis: Anemia  Assessment and Plan of Treatment: Seen by 2 GI specialists and hematologist. No acute bleeding noted. Recommend checking CBC regularly and following up with hematology outpatient. May need routine transfusion outpatient for HGB <7.0. Follow up with Dr. Solares. Continue Protonix and Carafate    Diagnosis: Anemia  Assessment and Plan of Treatment: Seen by 2 GI specialists and hematologist. No acute bleeding noted. Recommend checking CBC regularly and following up with hematology outpatient. May need routine transfusion outpatient for HGB <7.0. Follow up with Dr. Solares. Continue Protonix and Carafate

## 2021-12-12 NOTE — PROGRESS NOTE ADULT - SUBJECTIVE AND OBJECTIVE BOX
Date/Time Patient Seen:  		  Referring MD:   Data Reviewed	       Patient is a 76y old  Female who presents with a chief complaint of Anemia (12 Dec 2021 00:01)      Subjective/HPI     PAST MEDICAL & SURGICAL HISTORY:  Dementia of frontal lobe type    Aphasic stroke    Diabetes mellitus    Respiratory failure    Hypertension    GERD (gastroesophageal reflux disease)    Constipation    Respiratory failure    CVA (cerebral vascular accident)    HTN (hypertension)    DM (diabetes mellitus)    Advanced dementia    COVID-19 virus detected    Quadriplegia    Pneumonia    Type II diabetes mellitus    Hx of appendectomy    Gastrostomy in place    Tracheostomy in place    Tracheostomy tube present    Feeding by G-tube          Medication list         MEDICATIONS  (STANDING):  ALBUTerol    90 MICROgram(s) HFA Inhaler 2 Puff(s) Inhalation every 6 hours  chlorhexidine 0.12% Liquid 15 milliLiter(s) Oral Mucosa every 12 hours  dextrose 40% Gel 15 Gram(s) Oral once  dextrose 5%. 1000 milliLiter(s) (50 mL/Hr) IV Continuous <Continuous>  dextrose 5%. 1000 milliLiter(s) (100 mL/Hr) IV Continuous <Continuous>  dextrose 50% Injectable 25 Gram(s) IV Push once  dextrose 50% Injectable 25 Gram(s) IV Push once  dextrose 50% Injectable 12.5 Gram(s) IV Push once  glucagon  Injectable 1 milliGRAM(s) IntraMuscular once  heparin   Injectable 5000 Unit(s) SubCutaneous every 12 hours  insulin lispro (ADMELOG) corrective regimen sliding scale   SubCutaneous every 6 hours  methocarbamol 250 milliGRAM(s) Oral two times a day  midodrine 5 milliGRAM(s) Oral every 8 hours  pantoprazole  Injectable 40 milliGRAM(s) IV Push every 12 hours  piperacillin/tazobactam IVPB.. 3.375 Gram(s) IV Intermittent every 8 hours  polyethylene glycol 3350 17 Gram(s) Oral every 12 hours  senna 2 Tablet(s) Oral at bedtime  simethicone 80 milliGRAM(s) Chew every 6 hours  sodium chloride 0.9%. 1000 milliLiter(s) (65 mL/Hr) IV Continuous <Continuous>  sucralfate suspension 1 Gram(s) Enteral Tube every 6 hours    MEDICATIONS  (PRN):  acetaminophen     Tablet .. 650 milliGRAM(s) Oral every 6 hours PRN Temp greater or equal to 38C (100.4F), Mild Pain (1 - 3)  LORazepam   Injectable 1 milliGRAM(s) IV Push every 4 hours PRN Agitation         Vitals log        ICU Vital Signs Last 24 Hrs  T(C): 36.9 (11 Dec 2021 23:57), Max: 37.4 (11 Dec 2021 20:08)  T(F): 98.5 (11 Dec 2021 23:57), Max: 99.3 (11 Dec 2021 20:08)  HR: 58 (12 Dec 2021 06:00) (58 - 96)  BP: 107/53 (12 Dec 2021 06:00) (103/64 - 160/63)  BP(mean): 70 (12 Dec 2021 06:00) (64 - 88)  ABP: --  ABP(mean): --  RR: 18 (12 Dec 2021 06:00) (15 - 36)  SpO2: 100% (12 Dec 2021 06:00) (93% - 100%)       Mode: AC/ CMV (Assist Control/ Continuous Mandatory Ventilation)  RR (machine): 18  TV (machine): 400  FiO2: 40  PEEP: 5  ITime: 1  MAP: 12  PIP: 37      Input and Output:  I&O's Detail    11 Dec 2021 07:01  -  12 Dec 2021 07:00  --------------------------------------------------------  IN:    Enteral Tube Flush: 575 mL    Glucerna 1.5: 1150 mL    IV PiggyBack: 200 mL    sodium chloride 0.9%: 325 mL  Total IN: 2250 mL    OUT:    Voided (mL): 500 mL  Total OUT: 500 mL    Total NET: 1750 mL          Lab Data                        9.1    5.82  )-----------( 235      ( 12 Dec 2021 06:29 )             31.5     12-12    141  |  105  |  35<H>  ----------------------------<  155<H>  4.1   |  28  |  1.13    Ca    8.0<L>      12 Dec 2021 06:29  Phos  2.2     12-12  Mg     2.1     12-12    TPro  7.0  /  Alb  2.0<L>  /  TBili  0.5  /  DBili  x   /  AST  28  /  ALT  32  /  AlkPhos  221<H>  12-11            Review of Systems	      Objective     Physical Examination    heart s1s2  lung dec BS  abd soft      Pertinent Lab findings & Imaging      Annalise:  NO   Adequate UO     I&O's Detail    11 Dec 2021 07:01  -  12 Dec 2021 07:00  --------------------------------------------------------  IN:    Enteral Tube Flush: 575 mL    Glucerna 1.5: 1150 mL    IV PiggyBack: 200 mL    sodium chloride 0.9%: 325 mL  Total IN: 2250 mL    OUT:    Voided (mL): 500 mL  Total OUT: 500 mL    Total NET: 1750 mL               Discussed with:     Cultures:	        Radiology

## 2021-12-12 NOTE — DISCHARGE NOTE PROVIDER - NSDCMRMEDTOKEN_GEN_ALL_CORE_FT
albuterol 90 mcg/inh inhalation aerosol: 2 puff(s) inhaled every 6 hours  Bacid (LAC) oral tablet: 2 tab(s) by gastrostomy tube once a day  Carafate 1 g/10 mL oral suspension: 10 milliliter(s) by gastrostomy tube 4 times a day (before meals and at bedtime) for 14 days (Started 6/4/21)  chlorhexidine 0.12% mucous membrane liquid: 15 milliliter(s) mucous membrane 2 times a day  insulin lispro 100 units/mL injectable solution:  injectable; sliding scale coverage   ipratropium-albuterol 0.5 mg-2.5 mg/3 mL inhalation solution: 3 milliliter(s) inhaled 4 times a day  LORazepam 1 mg oral tablet: 1 tab(s) by gastrostomy tube 3 times a day, As Needed  methocarbamol: 250 milligram(s) by gastrostomy tube 2 times a day  pantoprazole 40 mg oral granule, delayed release: 40 milligram(s) by gastrostomy tube 2 times a day  polyethylene glycol 3350 oral powder for reconstitution: 17 gram(s) orally every 12 hours  ProAir HFA 90 mcg/inh inhalation aerosol: 2 puff(s) inhaled every 6 hours  senna 8.6 mg oral tablet: 3 tab(s) by gastrostomy tube once a day (at bedtime)  simethicone 80 mg oral tablet, chewable: 1 tab(s) orally every 6 hours  Tylenol 325 mg oral tablet: 2 tab(s) by gastrostomy tube once a day; 60 minutes prior to dressing change   Zosyn 3 g-0.375 g/50 mL intravenous solution: intravenous every 8 hours   albuterol 90 mcg/inh inhalation aerosol: 2 puff(s) inhaled every 6 hours  Bacid (LAC) oral tablet: 2 tab(s) by gastrostomy tube once a day  Carafate 1 g/10 mL oral suspension: 10 milliliter(s) by gastrostomy tube 4 times a day (before meals and at bedtime) for 14 days (Started 6/4/21)  chlorhexidine 0.12% mucous membrane liquid: 15 milliliter(s) mucous membrane 2 times a day  insulin lispro 100 units/mL injectable solution: injectable 3 times a day ; sliding scale coverage   ipratropium-albuterol 0.5 mg-2.5 mg/3 mL inhalation solution: 3 milliliter(s) inhaled 4 times a day  LORazepam 1 mg oral tablet: 1 tab(s) by gastrostomy tube 3 times a day, As Needed  methocarbamol: 250 milligram(s) by gastrostomy tube 2 times a day  pantoprazole 40 mg oral granule, delayed release: 40 milligram(s) by gastrostomy tube 2 times a day  polyethylene glycol 3350 oral powder for reconstitution: 17 gram(s) orally every 12 hours  ProAir HFA 90 mcg/inh inhalation aerosol: 2 puff(s) inhaled every 6 hours  senna 8.6 mg oral tablet: 3 tab(s) by gastrostomy tube once a day (at bedtime)  simethicone 80 mg oral tablet, chewable: 1 tab(s) orally every 6 hours  Tylenol 325 mg oral tablet: 2 tab(s) by gastrostomy tube once a day; 60 minutes prior to dressing change    albuterol 90 mcg/inh inhalation aerosol: 2 puff(s) inhaled every 6 hours  Bacid (LAC) oral tablet: 2 tab(s) by gastrostomy tube once a day  Carafate 1 g/10 mL oral suspension: 10 milliliter(s) by gastrostomy tube 4 times a day (before meals and at bedtime) for 14 days (Started 6/4/21)  chlorhexidine 0.12% mucous membrane liquid: 15 milliliter(s) mucous membrane 2 times a day  insulin lispro 100 units/mL injectable solution: injectable 3 times a day ; sliding scale coverage   ipratropium-albuterol 0.5 mg-2.5 mg/3 mL inhalation solution: 3 milliliter(s) inhaled 4 times a day  LORazepam 1 mg oral tablet: 1 tab(s) by gastrostomy tube 3 times a day, As Needed  methocarbamol 500 mg oral tablet: 1 tab(s) orally 2 times a day  pantoprazole 40 mg oral granule, delayed release: 40 milligram(s) by gastrostomy tube 2 times a day  piperacillin-tazobactam 3 g-0.375 g/50 mL intravenous solution: intravenous every 8 hours  polyethylene glycol 3350 oral powder for reconstitution: 17 gram(s) orally every 12 hours  ProAir HFA 90 mcg/inh inhalation aerosol: 2 puff(s) inhaled every 6 hours  senna 8.6 mg oral tablet: 3 tab(s) by gastrostomy tube once a day (at bedtime)  simethicone 80 mg oral tablet, chewable: 1 tab(s) orally every 6 hours  Tylenol 325 mg oral tablet: 2 tab(s) by gastrostomy tube once a day; 60 minutes prior to dressing change    albuterol 90 mcg/inh inhalation aerosol: 2 puff(s) inhaled every 6 hours  Bacid (LAC) oral tablet: 2 tab(s) by gastrostomy tube once a day  Carafate 1 g/10 mL oral suspension: 10 milliliter(s) by gastrostomy tube 4 times a day (before meals and at bedtime) for 14 days (Started 6/4/21)  chlorhexidine 0.12% mucous membrane liquid: 15 milliliter(s) mucous membrane 2 times a day  insulin lispro 100 units/mL injectable solution: injectable 3 times a day ; sliding scale coverage   ipratropium-albuterol 0.5 mg-2.5 mg/3 mL inhalation solution: 3 milliliter(s) inhaled 4 times a day  LORazepam 1 mg oral tablet: 1 tab(s) orally every 4 hours  methocarbamol 500 mg oral tablet: 1 tab(s) orally 2 times a day  pantoprazole 40 mg oral granule, delayed release: 40 milligram(s) by gastrostomy tube 2 times a day  polyethylene glycol 3350 oral powder for reconstitution: 17 gram(s) orally every 12 hours  ProAir HFA 90 mcg/inh inhalation aerosol: 2 puff(s) inhaled every 6 hours  senna 8.6 mg oral tablet: 3 tab(s) by gastrostomy tube once a day (at bedtime)  simethicone 80 mg oral tablet, chewable: 1 tab(s) orally every 6 hours  Tylenol 325 mg oral tablet: 2 tab(s) by gastrostomy tube once a day; 60 minutes prior to dressing change

## 2021-12-12 NOTE — DISCHARGE NOTE PROVIDER - DISCHARGE DATE
[FreeTextEntry1] : Symptoms: denies chest pain and denies intermittent leg claudication. No sob .No palpitations.  14-Dec-2021 20-Dec-2021

## 2021-12-12 NOTE — DISCHARGE NOTE PROVIDER - NSDCCAREPROVSEEN_GEN_ALL_CORE_FT
Camille, Blujuan Moncada, Davian Sandoval, Van Andrea, Heriberto Hooker, Bess Richardson, Skyler Olivas, Arturo Shin, Jose BERG

## 2021-12-12 NOTE — DISCHARGE NOTE PROVIDER - PROVIDER TOKENS
PROVIDER:[TOKEN:[[id number]]],PROVIDER:[TOKEN:[[id number]],FOLLOWUP:[1 week]],PROVIDER:[TOKEN:[[id number]],FOLLOWUP:[Routine]],PROVIDER:[TOKEN:[[id number]]]

## 2021-12-13 LAB
ANION GAP SERPL CALC-SCNC: 9 MMOL/L — SIGNIFICANT CHANGE UP (ref 5–17)
BUN SERPL-MCNC: 27 MG/DL — HIGH (ref 7–23)
CALCIUM SERPL-MCNC: 8.3 MG/DL — LOW (ref 8.4–10.5)
CHLORIDE SERPL-SCNC: 108 MMOL/L — SIGNIFICANT CHANGE UP (ref 96–108)
CO2 SERPL-SCNC: 25 MMOL/L — SIGNIFICANT CHANGE UP (ref 22–31)
CREAT SERPL-MCNC: 1.03 MG/DL — SIGNIFICANT CHANGE UP (ref 0.5–1.3)
CULTURE RESULTS: SIGNIFICANT CHANGE UP
CULTURE RESULTS: SIGNIFICANT CHANGE UP
FERRITIN SERPL-MCNC: 267 NG/ML — HIGH (ref 15–150)
GLUCOSE BLDC GLUCOMTR-MCNC: 112 MG/DL — HIGH (ref 70–99)
GLUCOSE BLDC GLUCOMTR-MCNC: 119 MG/DL — HIGH (ref 70–99)
GLUCOSE BLDC GLUCOMTR-MCNC: 147 MG/DL — HIGH (ref 70–99)
GLUCOSE BLDC GLUCOMTR-MCNC: 167 MG/DL — HIGH (ref 70–99)
GLUCOSE SERPL-MCNC: 145 MG/DL — HIGH (ref 70–99)
HCT VFR BLD CALC: 30.5 % — LOW (ref 34.5–45)
HGB BLD-MCNC: 8.9 G/DL — LOW (ref 11.5–15.5)
IRON SATN MFR SERPL: 15 % — SIGNIFICANT CHANGE UP (ref 14–50)
IRON SATN MFR SERPL: 31 UG/DL — SIGNIFICANT CHANGE UP (ref 30–160)
MAGNESIUM SERPL-MCNC: 2.1 MG/DL — SIGNIFICANT CHANGE UP (ref 1.6–2.6)
MCHC RBC-ENTMCNC: 25 PG — LOW (ref 27–34)
MCHC RBC-ENTMCNC: 29.2 GM/DL — LOW (ref 32–36)
MCV RBC AUTO: 85.7 FL — SIGNIFICANT CHANGE UP (ref 80–100)
NRBC # BLD: 0 /100 WBCS — SIGNIFICANT CHANGE UP (ref 0–0)
PHOSPHATE SERPL-MCNC: 1.8 MG/DL — LOW (ref 2.5–4.5)
PLATELET # BLD AUTO: 236 K/UL — SIGNIFICANT CHANGE UP (ref 150–400)
POTASSIUM SERPL-MCNC: 4.4 MMOL/L — SIGNIFICANT CHANGE UP (ref 3.5–5.3)
POTASSIUM SERPL-SCNC: 4.4 MMOL/L — SIGNIFICANT CHANGE UP (ref 3.5–5.3)
RBC # BLD: 3.56 M/UL — LOW (ref 3.8–5.2)
RBC # FLD: 17.3 % — HIGH (ref 10.3–14.5)
SARS-COV-2 RNA SPEC QL NAA+PROBE: SIGNIFICANT CHANGE UP
SODIUM SERPL-SCNC: 142 MMOL/L — SIGNIFICANT CHANGE UP (ref 135–145)
SPECIMEN SOURCE: SIGNIFICANT CHANGE UP
SPECIMEN SOURCE: SIGNIFICANT CHANGE UP
TIBC SERPL-MCNC: 216 UG/DL — LOW (ref 220–430)
UIBC SERPL-MCNC: 185 UG/DL — SIGNIFICANT CHANGE UP (ref 110–370)
WBC # BLD: 5.6 K/UL — SIGNIFICANT CHANGE UP (ref 3.8–10.5)
WBC # FLD AUTO: 5.6 K/UL — SIGNIFICANT CHANGE UP (ref 3.8–10.5)

## 2021-12-13 PROCEDURE — 99233 SBSQ HOSP IP/OBS HIGH 50: CPT

## 2021-12-13 PROCEDURE — 49465 FLUORO EXAM OF G/COLON TUBE: CPT

## 2021-12-13 RX ORDER — IOHEXOL 300 MG/ML
50 INJECTION, SOLUTION INTRAVENOUS ONCE
Refills: 0 | Status: COMPLETED | OUTPATIENT
Start: 2021-12-13 | End: 2021-12-13

## 2021-12-13 RX ORDER — POTASSIUM PHOSPHATE, MONOBASIC POTASSIUM PHOSPHATE, DIBASIC 236; 224 MG/ML; MG/ML
15 INJECTION, SOLUTION INTRAVENOUS ONCE
Refills: 0 | Status: COMPLETED | OUTPATIENT
Start: 2021-12-13 | End: 2021-12-13

## 2021-12-13 RX ADMIN — ALBUTEROL 2 PUFF(S): 90 AEROSOL, METERED ORAL at 07:48

## 2021-12-13 RX ADMIN — Medication 1 MILLIGRAM(S): at 16:51

## 2021-12-13 RX ADMIN — Medication 1 MILLIGRAM(S): at 04:21

## 2021-12-13 RX ADMIN — PANTOPRAZOLE SODIUM 40 MILLIGRAM(S): 20 TABLET, DELAYED RELEASE ORAL at 16:54

## 2021-12-13 RX ADMIN — HEPARIN SODIUM 5000 UNIT(S): 5000 INJECTION INTRAVENOUS; SUBCUTANEOUS at 06:44

## 2021-12-13 RX ADMIN — Medication 1 MILLIGRAM(S): at 08:32

## 2021-12-13 RX ADMIN — SIMETHICONE 80 MILLIGRAM(S): 80 TABLET, CHEWABLE ORAL at 16:54

## 2021-12-13 RX ADMIN — SIMETHICONE 80 MILLIGRAM(S): 80 TABLET, CHEWABLE ORAL at 00:17

## 2021-12-13 RX ADMIN — ALBUTEROL 2 PUFF(S): 90 AEROSOL, METERED ORAL at 17:02

## 2021-12-13 RX ADMIN — Medication 4: at 00:18

## 2021-12-13 RX ADMIN — Medication 1 GRAM(S): at 23:24

## 2021-12-13 RX ADMIN — Medication 1 GRAM(S): at 00:17

## 2021-12-13 RX ADMIN — POLYETHYLENE GLYCOL 3350 17 GRAM(S): 17 POWDER, FOR SOLUTION ORAL at 16:54

## 2021-12-13 RX ADMIN — METHOCARBAMOL 250 MILLIGRAM(S): 500 TABLET, FILM COATED ORAL at 16:54

## 2021-12-13 RX ADMIN — SODIUM CHLORIDE 50 MILLILITER(S): 9 INJECTION INTRAMUSCULAR; INTRAVENOUS; SUBCUTANEOUS at 04:22

## 2021-12-13 RX ADMIN — SIMETHICONE 80 MILLIGRAM(S): 80 TABLET, CHEWABLE ORAL at 23:24

## 2021-12-13 RX ADMIN — Medication 2: at 06:50

## 2021-12-13 RX ADMIN — ALBUTEROL 2 PUFF(S): 90 AEROSOL, METERED ORAL at 20:15

## 2021-12-13 RX ADMIN — IOHEXOL 50 MILLILITER(S): 300 INJECTION, SOLUTION INTRAVENOUS at 15:51

## 2021-12-13 RX ADMIN — Medication 1 MILLIGRAM(S): at 12:32

## 2021-12-13 RX ADMIN — Medication 1 MILLIGRAM(S): at 21:01

## 2021-12-13 RX ADMIN — CHLORHEXIDINE GLUCONATE 15 MILLILITER(S): 213 SOLUTION TOPICAL at 06:45

## 2021-12-13 RX ADMIN — CHLORHEXIDINE GLUCONATE 15 MILLILITER(S): 213 SOLUTION TOPICAL at 16:53

## 2021-12-13 RX ADMIN — HEPARIN SODIUM 5000 UNIT(S): 5000 INJECTION INTRAVENOUS; SUBCUTANEOUS at 16:54

## 2021-12-13 RX ADMIN — PANTOPRAZOLE SODIUM 40 MILLIGRAM(S): 20 TABLET, DELAYED RELEASE ORAL at 06:44

## 2021-12-13 RX ADMIN — PIPERACILLIN AND TAZOBACTAM 25 GRAM(S): 4; .5 INJECTION, POWDER, LYOPHILIZED, FOR SOLUTION INTRAVENOUS at 06:44

## 2021-12-13 RX ADMIN — POTASSIUM PHOSPHATE, MONOBASIC POTASSIUM PHOSPHATE, DIBASIC 62.5 MILLIMOLE(S): 236; 224 INJECTION, SOLUTION INTRAVENOUS at 11:16

## 2021-12-13 RX ADMIN — Medication 1 GRAM(S): at 16:54

## 2021-12-13 NOTE — PROGRESS NOTE ADULT - SUBJECTIVE AND OBJECTIVE BOX
Patient is a 76y old  Female who presents with a chief complaint of Anemia (13 Dec 2021 07:24)      INTERVAL HPI/OVERNIGHT EVENTS: Patient seen and examined at bedside. No overnight events. PEG tube was dislodged this morning. Woody placed in track interim       MEDICATIONS  (STANDING):  ALBUTerol    90 MICROgram(s) HFA Inhaler 2 Puff(s) Inhalation every 6 hours  chlorhexidine 0.12% Liquid 15 milliLiter(s) Oral Mucosa every 12 hours  dextrose 40% Gel 15 Gram(s) Oral once  dextrose 5%. 1000 milliLiter(s) (50 mL/Hr) IV Continuous <Continuous>  dextrose 5%. 1000 milliLiter(s) (100 mL/Hr) IV Continuous <Continuous>  dextrose 50% Injectable 25 Gram(s) IV Push once  dextrose 50% Injectable 12.5 Gram(s) IV Push once  dextrose 50% Injectable 25 Gram(s) IV Push once  glucagon  Injectable 1 milliGRAM(s) IntraMuscular once  heparin   Injectable 5000 Unit(s) SubCutaneous every 12 hours  insulin lispro (ADMELOG) corrective regimen sliding scale   SubCutaneous every 6 hours  methocarbamol 250 milliGRAM(s) Oral two times a day  pantoprazole  Injectable 40 milliGRAM(s) IV Push every 12 hours  piperacillin/tazobactam IVPB.. 3.375 Gram(s) IV Intermittent every 8 hours  polyethylene glycol 3350 17 Gram(s) Oral every 12 hours  potassium phosphate IVPB 15 milliMole(s) IV Intermittent once  senna 2 Tablet(s) Oral at bedtime  simethicone 80 milliGRAM(s) Chew every 6 hours  sodium chloride 0.9%. 1000 milliLiter(s) (50 mL/Hr) IV Continuous <Continuous>  sucralfate suspension 1 Gram(s) Enteral Tube every 6 hours    MEDICATIONS  (PRN):  acetaminophen     Tablet .. 650 milliGRAM(s) Oral every 6 hours PRN Temp greater or equal to 38C (100.4F), Mild Pain (1 - 3)  LORazepam   Injectable 1 milliGRAM(s) IV Push every 4 hours PRN Agitation      Allergies    codeine (Hives)    Intolerances        REVIEW OF SYSTEMS:  Unable to obtain meaningful ROS due to mental status      Vital Signs Last 24 Hrs  T(C): 36.8 (13 Dec 2021 07:35), Max: 36.8 (12 Dec 2021 15:50)  T(F): 98.2 (13 Dec 2021 07:35), Max: 98.3 (12 Dec 2021 15:50)  HR: 86 (13 Dec 2021 07:50) (58 - 130)  BP: 116/65 (13 Dec 2021 06:00) (105/54 - 194/76)  BP(mean): 80 (13 Dec 2021 06:00) (66 - 109)  RR: 18 (13 Dec 2021 06:00) (9 - 37)  SpO2: 98% (13 Dec 2021 07:50) (83% - 100%)    PHYSICAL EXAM:  GENERAL: chronically ill appearing, emaciated, NAD, obtunded  HEAD:  atraumatic  EYES: conjunctiva clear  NECK: +trach in place, clean  RESPIRATORY:  coarse mechanical breath sounds, no gross crackles or rales (+)trach, vent  CARDIOVASCULAR:  regular rate and rhythm, no murmurs or rubs or gallops, 2+ peripheral pulses  GASTROINTESTINAL:  soft, clean and dry as well, mildly distended, PEG site c/d/i, Woody in place  EXTREMITIES: no clubbing or cyanosis or edema  MUSCULOSKELETAL:  +diffuse contractures in all joints  NERVOUS SYSTEM: does not respond to pain  SKIN: necrotic tips of bilateral 1st toes      LABS:                        8.9    5.60  )-----------( 236      ( 13 Dec 2021 06:20 )             30.5     CBC Full  -  ( 13 Dec 2021 06:20 )  WBC Count : 5.60 K/uL  Hemoglobin : 8.9 g/dL  Hematocrit : 30.5 %  Platelet Count - Automated : 236 K/uL  Mean Cell Volume : 85.7 fl  Mean Cell Hemoglobin : 25.0 pg  Mean Cell Hemoglobin Concentration : 29.2 gm/dL  Auto Neutrophil # : x  Auto Lymphocyte # : x  Auto Monocyte # : x  Auto Eosinophil # : x  Auto Basophil # : x  Auto Neutrophil % : x  Auto Lymphocyte % : x  Auto Monocyte % : x  Auto Eosinophil % : x  Auto Basophil % : x    13 Dec 2021 06:20    142    |  108    |  27     ----------------------------<  145    4.4     |  25     |  1.03     Ca    8.3        13 Dec 2021 06:20  Phos  1.8       13 Dec 2021 06:20  Mg     2.1       13 Dec 2021 06:20          CAPILLARY BLOOD GLUCOSE      POCT Blood Glucose.: 167 mg/dL (13 Dec 2021 06:45)        Culture - Sputum (collected 12-11-21 @ 00:41)  Source: .Sputum Sputum  Gram Stain (12-11-21 @ 04:56):    Moderate polymorphonuclear leukocytes per low power field    Rare Squamous epithelial cells per low power field    Numerous Gram Negative Rods seen per oil power field  Final Report (12-12-21 @ 17:47):    Few Pseudomonas aeruginosa (Carbapenem Resistant)    Moderate Serratia marcescens    Moderate Stenotrophomonas maltophilia    Normal Respiratory Elvira present  Organism: Stenotrophomonas maltophilia (12-12-21 @ 18:03)      -  Ceftazidime: R >16      -  Levofloxacin: S <=0.5      -  Trimethoprim/Sulfamethoxazole: S <=0.5/9.5      Method Type: PRATIK  Organism: Pseudomonas aeruginosa (Carbapenem Resistant) (12-12-21 @ 18:03)      -  Amikacin: S <=16      -  Aztreonam: S <=4      -  Cefepime: S 4      -  Ceftazidime: S 4      -  Ceftazidime/Avibactam: S      -  Ceftolozane/tazobactam: S      -  Ciprofloxacin: S <=0.25      -  Gentamicin: S <=2      -  Imipenem: R >8      -  Levofloxacin: S <=0.5      -  Meropenem: I 4      -  Piperacillin/Tazobactam: S <=8      -  Tobramycin: S <=2      Method Type: PRATIK  Organism: Serratia marcescens (12-12-21 @ 18:03)      -  Amikacin: S <=16      -  Amoxicillin/Clavulanic Acid: R >16/8      -  Ampicillin: R >16 These ampicillin results predict results for amoxicillin      -  Ampicillin/Sulbactam: R >16/8 Enterobacter, Klebsiella aerogenes, Citrobacter, and Serratia may develop resistance during prolonged therapy (3-4 days)      -  Aztreonam: S <=4      -  Cefazolin: R >16 Enterobacter, Klebsiella aerogenes, Citrobacter, and Serratia may develop resistance during prolonged therapy (3-4 days)      -  Cefepime: S <=2      -  Cefoxitin: R <=8      -  Ceftriaxone: I 2 Enterobacter, Klebsiella aerogenes, Citrobacter, and Serratia may develop resistance during prolonged therapy      -  Ciprofloxacin: R >2      -  Ertapenem: S <=0.5      -  Gentamicin: S <=2      -  Levofloxacin: R 2      -  Meropenem: S <=1      -  Piperacillin/Tazobactam: S <=8      -  Tobramycin: S <=2      -  Trimethoprim/Sulfamethoxazole: S <=0.5/9.5      Method Type: PRATIK  Organism: Serratia marcescens  Pseudomonas aeruginosa (Carbapenem Resistant)  Stenotrophomonas maltophilia (12-12-21 @ 17:47)    Culture - Urine (collected 12-08-21 @ 17:55)  Source: Clean Catch Clean Catch (Midstream)  Final Report (12-09-21 @ 13:58):    <10,000 CFU/mL Normal Urogenital Elvira    Culture - Blood (collected 12-08-21 @ 15:24)  Source: .Blood Blood-Peripheral  Preliminary Report (12-09-21 @ 16:01):    No growth to date.    Culture - Blood (collected 12-08-21 @ 15:24)  Source: .Blood Blood-Peripheral  Preliminary Report (12-09-21 @ 16:01):    No growth to date.        RADIOLOGY & ADDITIONAL TESTS:     Consultant(s) Notes Reviewed:  [x] YES  [ ] NO    Care Discussed with [x] Consultants  [x] Patient  [ ] Family  [ ]      [ x] Other; RN  DVT ppx

## 2021-12-13 NOTE — PROGRESS NOTE ADULT - ASSESSMENT
75F with chronic resp failure - vent dependent, hx of subarachnoid hemorrhage, hx of cardiac arrest, dementia s/p PEG, HTN, DM2 on insulin, hx of CVA, GERD, COPD, admission here from 9/25 to 10/4 and 11/7-11/11 for respiratory failure/sepsis possibly 2/2 aspiration vs vent associated PNA who presents from [facility] for abnormal labs.  Patient does not contribute to history. In the ED, patient's initial triage vitals were BP 88/37, HR 81, RR 18, 100% on vent and T 99 F.  Labs showed leukocytosis of 12.18, HGB 5.2, BUN/Cr 89/2.00. CXR: Tracheostomy cannula reidentified in position. No change heart mediastinum. Widespread bilateral airspace disease slightly improved.  correlate for pulmonary edema and/or infection. No significant pleural effusion. No pneumothorax. Patient's hand projects over portion of the right base. CT chest and A/P pending  (08 Dec 2021 15:50)      ACUTE RENAL FAILURE: sodium chloride 0.9%. 1000 milliLiter(s) (65 mL/Hr  Serum creatinine is improving    There is no progression . No uremic symptoms  No evidence of anemia .  Fluid status stable.  Will continue to avoid nephrotoxic drugs.  Patient remains asymptomatic.   Continue current therapy.    f/u blood and urine cx,serial lactate levels,monitor vitals roddy raymundo hydration,monitor urine output and renal profile,    hypotension midodrine 5 milliGRAM(s) Oral every 8 hours

## 2021-12-13 NOTE — PROGRESS NOTE ADULT - SUBJECTIVE AND OBJECTIVE BOX
Chief Complaint: Abnormal labs    Interval Events: No events overnight    Review of Systems:  Unable to obtain    Physical Exam:  Vital Signs Last 24 Hrs  T(C): 36.8 (13 Dec 2021 07:35), Max: 36.8 (12 Dec 2021 15:50)  T(F): 98.2 (13 Dec 2021 07:35), Max: 98.3 (12 Dec 2021 15:50)  HR: 86 (13 Dec 2021 07:50) (58 - 130)  BP: 116/65 (13 Dec 2021 06:00) (105/54 - 194/76)  BP(mean): 80 (13 Dec 2021 06:00) (66 - 109)  RR: 18 (13 Dec 2021 06:00) (9 - 37)  SpO2: 98% (13 Dec 2021 07:50) (83% - 100%)  General: Unresponsive  HEENT: Trach  Neck: No JVD, no carotid bruit  Lungs: CTAB  CV: RRR, nl S1/S2, no M/R/G  Abdomen: S/NT/ND, +BS  Extremities: No LE edema, no cyanosis  Neuro: AAOx0  Skin: No rash    Labs:    12-13    142  |  108  |  27<H>  ----------------------------<  145<H>  4.4   |  25  |  1.03    Ca    8.3<L>      13 Dec 2021 06:20  Phos  1.8     12-13  Mg     2.1     12-13                          8.9    5.60  )-----------( 236      ( 13 Dec 2021 06:20 )             30.5       Telemetry: Sinus rhythm

## 2021-12-13 NOTE — PROGRESS NOTE ADULT - ASSESSMENT
75F with chronic resp failure - vent dependent, hx of subarachnoid hemorrhage, hx of cardiac arrest, dementia s/p PEG, HTN, DM2 on insulin, hx of CVA, GERD, COPD, admission here from 9/25 to 10/4 and 11/7-11/11 for respiratory failure/sepsis possibly 2/2 aspiration vs vent associated PNA who presents from [facility] for abnormal labs.    #Anemia   HGB improving   Has hx of anemia on previous hospitalizations  Occult positive    GI consulted Dr. Nieves, no plans for scope  S/p 2 units PRBC on admission    demanding endoscopy and not realistic about expectations despite multiple conversations with myself and Dr. Nieves.   Alternate opinion from GI Dr. Dupree appreciated. No plan for endoscopy   Hematology input appreciated: would check CBC weekly if possible and arrange for ambulatory transfusions as needed.  Expect patient will remain transfusion dependent.  Would not treat with ESAs at present     # RYAN   Improving   Baseline creatinine .9  Avoid nephrotoxic meds  gentle hydration with ns   repeat BMP in AM  Strict I&O's  Nephrology following     #PEG tube malfunction  GI following  Woody in place in interim   Plan to replace PEG tube     #Hyponatremia  Gentle hydration with ns  improving  trend bmp     # aspiration PNA  - Sepsis poa  - On empiric abx with Zosyn, vanc dc'd   - ID following, reccs appreciated   - Follow blood and urine cultures    #COPD:  - c/w albuterol  - pulm (Jaime), recs appreciated    #GERD:  - c/w carafate and PPI     # VTE ppx:  - Heparin for DVT ppx

## 2021-12-13 NOTE — PROGRESS NOTE ADULT - ASSESSMENT
75F with chronic resp failure - vent dependent, hx of subarachnoid hemorrhage, hx of cardiac arrest, dementia s/p PEG, HTN, DM2 on insulin, hx of CVA, GERD, COPD, admission here from 9/25 to 10/4 and 11/7-11/11 for respiratory failure/sepsis possibly 2/2 aspiration vs vent associated PNA who presents from [facility] w leukocytosis of 12.18, and concerns for repeat aspiration PNA    CT-Distal airways impaction and multifocal pneumonia, possibly related to aspiration. Most of the opacities are either new or unchanged since prior study.     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   respiratory culture w/ pseudomonas, Stenotrophomonas and Serratia marcescens  on Zosyn day 6 of 7 day course of antibiotics  has clinically improved despite zosyn only covering pseudomonas and serratia but not stenotrophomonas therefore likely colonization and not infection & therefore will not adjust therapy to cover this (also would involve large doses of bactrim & pt w/ CKD)  can switch zosyn to levofloxacin; given CrCl 31, start levofloxacin 750mg x 1 day to complete 7 day course (will cover for today and tomorrow)  if giving levofloxacin PO would need to separate from tube feeds by at least 2 hours or can give IV (will not need to worry about absorption issues w/ tube feeds if IV)  discharge planning    Emil Medellin M.D.  [phone]  [facility], Division of Infectious Diseases  [phone]  After 5pm on weekdays and all day on weekends - please call [phone]

## 2021-12-13 NOTE — PROGRESS NOTE ADULT - SUBJECTIVE AND OBJECTIVE BOX
Date/Time Patient Seen:  		  Referring MD:   Data Reviewed	       Patient is a 76y old  Female who presents with a chief complaint of Anemia (13 Dec 2021 06:43)      Subjective/HPI     PAST MEDICAL & SURGICAL HISTORY:  Dementia of frontal lobe type    Aphasic stroke    Diabetes mellitus    Respiratory failure    Hypertension    GERD (gastroesophageal reflux disease)    Constipation    Respiratory failure    CVA (cerebral vascular accident)    HTN (hypertension)    DM (diabetes mellitus)    Advanced dementia    COVID-19 virus detected    Quadriplegia    Pneumonia    Type II diabetes mellitus    Hx of appendectomy    Gastrostomy in place    Tracheostomy in place    Tracheostomy tube present    Feeding by G-tube          Medication list         MEDICATIONS  (STANDING):  ALBUTerol    90 MICROgram(s) HFA Inhaler 2 Puff(s) Inhalation every 6 hours  chlorhexidine 0.12% Liquid 15 milliLiter(s) Oral Mucosa every 12 hours  dextrose 40% Gel 15 Gram(s) Oral once  dextrose 5%. 1000 milliLiter(s) (50 mL/Hr) IV Continuous <Continuous>  dextrose 5%. 1000 milliLiter(s) (100 mL/Hr) IV Continuous <Continuous>  dextrose 50% Injectable 25 Gram(s) IV Push once  dextrose 50% Injectable 12.5 Gram(s) IV Push once  dextrose 50% Injectable 25 Gram(s) IV Push once  glucagon  Injectable 1 milliGRAM(s) IntraMuscular once  heparin   Injectable 5000 Unit(s) SubCutaneous every 12 hours  insulin lispro (ADMELOG) corrective regimen sliding scale   SubCutaneous every 6 hours  methocarbamol 250 milliGRAM(s) Oral two times a day  pantoprazole  Injectable 40 milliGRAM(s) IV Push every 12 hours  piperacillin/tazobactam IVPB.. 3.375 Gram(s) IV Intermittent every 8 hours  polyethylene glycol 3350 17 Gram(s) Oral every 12 hours  senna 2 Tablet(s) Oral at bedtime  simethicone 80 milliGRAM(s) Chew every 6 hours  sodium chloride 0.9%. 1000 milliLiter(s) (50 mL/Hr) IV Continuous <Continuous>  sucralfate suspension 1 Gram(s) Enteral Tube every 6 hours    MEDICATIONS  (PRN):  acetaminophen     Tablet .. 650 milliGRAM(s) Oral every 6 hours PRN Temp greater or equal to 38C (100.4F), Mild Pain (1 - 3)  LORazepam   Injectable 1 milliGRAM(s) IV Push every 4 hours PRN Agitation         Vitals log        ICU Vital Signs Last 24 Hrs  T(C): 36.7 (13 Dec 2021 06:46), Max: 36.8 (12 Dec 2021 08:00)  T(F): 98.1 (13 Dec 2021 06:46), Max: 98.3 (12 Dec 2021 08:00)  HR: 64 (13 Dec 2021 04:00) (58 - 130)  BP: 114/59 (13 Dec 2021 04:00) (105/54 - 194/76)  BP(mean): 76 (13 Dec 2021 04:00) (66 - 109)  ABP: --  ABP(mean): --  RR: 18 (13 Dec 2021 04:00) (9 - 37)  SpO2: 100% (13 Dec 2021 04:00) (83% - 100%)       Mode: AC/ CMV (Assist Control/ Continuous Mandatory Ventilation)  RR (machine): 18  TV (machine): 400  FiO2: 40  PEEP: 5  ITime: 1  MAP: 12  PIP: 39      Input and Output:  I&O's Detail    11 Dec 2021 07:01  -  12 Dec 2021 07:00  --------------------------------------------------------  IN:    Enteral Tube Flush: 600 mL    Glucerna 1.5: 1200 mL    IV PiggyBack: 200 mL    sodium chloride 0.9%: 390 mL  Total IN: 2390 mL    OUT:    Voided (mL): 500 mL  Total OUT: 500 mL    Total NET: 1890 mL      12 Dec 2021 07:01  -  13 Dec 2021 06:57  --------------------------------------------------------  IN:    Enteral Tube Flush: 475 mL    Glucerna 1.5: 950 mL    IV PiggyBack: 200 mL    sodium chloride 0.9%: 325 mL    sodium chloride 0.9%: 700 mL  Total IN: 2650 mL    OUT:    Voided (mL): 250 mL  Total OUT: 250 mL    Total NET: 2400 mL          Lab Data                        8.9    5.60  )-----------( 236      ( 13 Dec 2021 06:20 )             30.5     12-13    142  |  108  |  27<H>  ----------------------------<  145<H>  4.4   |  25  |  1.03    Ca    8.3<L>      13 Dec 2021 06:20  Phos  2.2     12-12  Mg     2.1     12-12    TPro  7.0  /  Alb  2.0<L>  /  TBili  0.5  /  DBili  x   /  AST  28  /  ALT  32  /  AlkPhos  221<H>  12-11            Review of Systems	      Objective     Physical Examination    heart s1s2  lung dec BS  abd soft      Pertinent Lab findings & Imaging      Annalise:  NO   Adequate UO     I&O's Detail    11 Dec 2021 07:01  -  12 Dec 2021 07:00  --------------------------------------------------------  IN:    Enteral Tube Flush: 600 mL    Glucerna 1.5: 1200 mL    IV PiggyBack: 200 mL    sodium chloride 0.9%: 390 mL  Total IN: 2390 mL    OUT:    Voided (mL): 500 mL  Total OUT: 500 mL    Total NET: 1890 mL      12 Dec 2021 07:01  -  13 Dec 2021 06:57  --------------------------------------------------------  IN:    Enteral Tube Flush: 475 mL    Glucerna 1.5: 950 mL    IV PiggyBack: 200 mL    sodium chloride 0.9%: 325 mL    sodium chloride 0.9%: 700 mL  Total IN: 2650 mL    OUT:    Voided (mL): 250 mL  Total OUT: 250 mL    Total NET: 2400 mL               Discussed with:     Cultures:	        Radiology

## 2021-12-13 NOTE — PROGRESS NOTE ADULT - SUBJECTIVE AND OBJECTIVE BOX
Peg tube found in the bed the balloon was not functioning,    Replaced with same size 16F vaughn to keep the stoma patent.      Seems to be in the  stomach as there are feeds coming out, but will get a gastrografin study     Dr Madrid  called.

## 2021-12-13 NOTE — PROGRESS NOTE ADULT - ASSESSMENT
75F with chronic resp failure - vent dependent, hx of subarachnoid hemorrhage, hx of cardiac arrest, dementia s/p PEG, HTN, DM2 on insulin, hx of CVA, GERD, COPD, admission here from 9/25 to 10/4 and 11/7-11/11 for respiratory failure/sepsis possibly 2/2 aspiration vs vent associated PNA who presents from [facility] for abnormal labs.  Patient does not contribute to history. In the ED, patient's initial triage vitals were BP 88/37, HR 81, RR 18, 100% on vent and T 99 F.  Labs showed leukocytosis of 12.18, HGB 5.2, BUN/Cr 89/2.00.  -  received 2 units PRBC since admission with stable blood indices since  - most likely cause of anemia is related to chronic disease however it is possible has been complicated by intermittent GI blood loss.   - if iron stores low, would consider supplementation  - overall, recommend weekly CBC at SNF and transfuse to maintain Hg >7  - case discussed with Dr. Hooker (Hospitalist); please call with additional questions; will sign off

## 2021-12-13 NOTE — PROGRESS NOTE ADULT - ASSESSMENT
CHAPINCITO GUIDRY 75 f [facility] S 12/8/2021   DR ILIANA KEMP     REVIEW OF SYMPTOMS      Able to give (reliable) ROS  NO     PHYSICAL EXAM    HEENT Unremarkable  atraumatic   RESP Fair air entry EXP prolonged    Harsh breath sound Resp distres mild   CARDIAC S1 S2 No S3     NO JVD    ABDOMEN SOFT BS PRESENT NOT DISTENDED No hepatosplenomegaly   PEDAL EDEMA present No calf tenderness  NO rash     ________________  Age DOA CC.  76 year old female with a history of Pneumonia  HTN, DM, CVA, dementia, chronic respiratory failure s/p trach, PEG, cardiac arrest who presented 12/8/2021 from NH with abnormal labs. She was found to have a hemoglobin of 6. No further history could be obtained from patient.  Pulm crit care consulted 12/8/2021     _____                            GOC.12/8/2021 Full code   COVID STATUS. 12/8/2021 scv2 (-)   ICU STAY. Admitted ICU 12/8/2021   ABIO.   12/13/2021 Levaquin 750 1 dose Dr Medellin  (12/11 sp stenotrophomonas)   12/8 zosyn x 7d Pneum  (12/11 sp serrat CRP    PATIENT DATA   VITALS/PO/IO/VENT/DRIPS.   12/13/2021 afeb 60 130/60   12/13/2021 u 350   12/13/2021 ac 18/400/5/.4       BEST PRACTICE ISSUES.                                                  HEAD OF BED ELEVATION. Yes  DV[facility]XIS.      [facility]   12/8/2021 No pharmac pplx as pt possibly has abla on admission 12/8/2021                                    SQUIRES PROPHYLAXIS.      12/8/2021 IV protonix 40.2       12/9/2021 carafate 1.4                                                  DIET.            12/9/2021 glucerna 1.5 1000 12/9 12/8/2021 npo till gi bleed ruled out              INFECTION PROPHYLAXIS.    12/8/2021 chlorhexidine 0.12% bid     GENERAL ISSUES                                       ALLERGY.         codeine                   WEIGHT.           12/8/2021 61                           BMI.                   12/8/2021 22  SPEECH SWALLOW RECOMMENDATIONS.   IV FLUIDS.   12/12/2021 ns 50 x 2d      ASSESSMENT/RECOMMENDATIONS.    RESP.   Trach  SP Trach pmh     VENT MANAGEMENT.  VENT DEPENDENT RESP FAILURE pmh.  Clinically stable on current settings   12/10 dw  He wants us to try weaning  12/11 will try simv in am     COPD pmh.   12/9/2021 albuterol hf.4   12/9/2021 Spiriva   Cont Rx    HEMODYNAMICS.   HYPOTENSION poa.   12/8/2021 11a 2l ns given in er   9/8/2021 ECHO n lvsf mild dd pasp 51   BP has improvd   target map 65 (+)       INFECTION.   Pneumonia  poa 12/8/2021   w 12/9-12/11/2021 w 8.8 - 6.9   12/9 pr 6.7   CXR 12/8 widespread airspace dis sl improvd cw 11/9/2021 12/8 ct cap distal airway impaction and mf pneum related to aspirat   Changes are either new or unchanged   Volume loss rll has improvd since 11/7/2021   Small l effsn   12/8 mrsa (-)   12/8 blod c (-)  12/8 urine c (-)   12/11 sputum stenotrophomonas levaq sens  12/11 sputum CRP Pseudomonas  12/11 sputu serratia   12/13/2021 Levaquin 750 1 dose Dr Medellin  12/8 Zosyn x 7d     SEVERE ANEMIA poa   Hb 12/8-12/8-12/9-12/11-12/12-12/13/2021   Hb 5.2 -9.7-8.6 - 9.4-9.1-8.9   12/8/2021 Hb 5.2   12/8 ct cap no rp bleed No hydronephrosis   12/8/2021 2 u prbc ordered in er   Monitor Hb serially   Target Hb 7 (+)    GERD pmh.  On ppi     sp PEG pmh    GI BLEED poa 12/8 12/9/2021 SOB (+)   12/9/2021 Seen by Dr Nieves No intervention pplannd   12/8/2021 IV protonix 40.2       12/9/2021 carafate 1.4                12/9/2021 Dr BETH vinesed dvt pplx hpsc   12/11/2021 gi not planning any pproced    HYPONATREMIA poa.   12/8-12/9-12/11-12/11-12/13/2021 Na 127 -131 -137-141-142  Improving     RYAN poa.   12/8-12/9-12/11-12/11-12/13/2021   Cr 2-1.5 -1.2- 1.1-1  12/10 ns 65 x 2 d   Improving    SEIZURES pmh   Takes lorazepam 1.3         OVERALL ISSUES  76 full code Anoxic encephalopathy vdrf peg trach patient age   ANEMIA Monitor Hb   PNEUM zosyn (crp) One dose levaq 12/13 (steno)  HYPONA Monitor   POOR IV ACCESS 12/10/2021 For midline     TIME SPENT   Over 36 minutes aggregate critical care time spent on encounter; activities included   direct patient care, counseling and/or coordinating care reviewing notes, lab data/ imaging , discussion with multidisciplinary team/ patient  /family and explaining in detail risks, benefits, alternatives  of the recommendations     CHAPINCITO GUIDRY 75 f [facility] S 12/8/2021   DR ILIANA KEMP

## 2021-12-13 NOTE — PROGRESS NOTE ADULT - SUBJECTIVE AND OBJECTIVE BOX
[facility], Division of Infectious Diseases  MOIZ Lora Y. Patel, S. Shah  [phone]  ([phone] - weekdays after 5pm and weekends)    Name: BREA BECKHAM  Age/Gender: 76y Female  MRN: [id number]    Interval History:  Patient seen this morning.   Notes reviewed.   Afebrile.   remains on vent    Allergies: codeine (Hives)      Objective:  Vitals:   T(F): 98.2 (12-13-21 @ 07:35), Max: 98.3 (12-12-21 @ 15:50)  HR: 67 (12-13-21 @ 12:00) (58 - 130)  BP: 133/66 (12-13-21 @ 12:00) (101/57 - 194/76)  RR: 16 (12-13-21 @ 12:00) (13 - 30)  SpO2: 98% (12-13-21 @ 12:00) (83% - 100%)  Physical Examination:  General: no acute distress  HEENT: NC/AT, anicteric, trachesotomy, on vent  Cardio: S1, S2 present, normal rate  Resp: clear to auscultation bilaterally anteriorly  Abd: soft, distended  Neuro: aphonia, does not   Ext: b/l LE edema, contractures b/l  Skin: warm, dry, no visible rash  Lines:    Laboratory Studies:  CBC:                       8.9    5.60  )-----------( 236      ( 13 Dec 2021 06:20 )             30.5     WBC Trend:  5.60 12-13-21 @ 06:20  5.82 12-12-21 @ 06:29  6.98 12-11-21 @ 09:39  13.27 12-10-21 @ 08:38  9.43 12-09-21 @ 20:48  8.82 12-09-21 @ 07:09  6.92 12-08-21 @ 23:10  8.30 12-08-21 @ 20:11  12.18 12-08-21 @ 11:58    CMP: 12-13    142  |  108  |  27<H>  ----------------------------<  145<H>  4.4   |  25  |  1.03    Ca    8.3<L>      13 Dec 2021 06:20  Phos  1.8     12-13  Mg     2.1     12-13              Microbiology: reviewed     Culture - Sputum (collected 12-11-21 @ 00:41)  Source: .Sputum Sputum  Gram Stain (12-11-21 @ 04:56):    Moderate polymorphonuclear leukocytes per low power field    Rare Squamous epithelial cells per low power field    Numerous Gram Negative Rods seen per oil power field  Final Report (12-12-21 @ 17:47):    Few Pseudomonas aeruginosa (Carbapenem Resistant)    Moderate Serratia marcescens    Moderate Stenotrophomonas maltophilia    Normal Respiratory Elvira present  Organism: Stenotrophomonas maltophilia (12-12-21 @ 18:03)      -  Ceftazidime: R >16      -  Levofloxacin: S <=0.5      -  Trimethoprim/Sulfamethoxazole: S <=0.5/9.5      Method Type: PRATIK  Organism: Pseudomonas aeruginosa (Carbapenem Resistant) (12-12-21 @ 18:03)      -  Amikacin: S <=16      -  Aztreonam: S <=4      -  Cefepime: S 4      -  Ceftazidime: S 4      -  Ceftazidime/Avibactam: S      -  Ceftolozane/tazobactam: S      -  Ciprofloxacin: S <=0.25      -  Gentamicin: S <=2      -  Imipenem: R >8      -  Levofloxacin: S <=0.5      -  Meropenem: I 4      -  Piperacillin/Tazobactam: S <=8      -  Tobramycin: S <=2      Method Type: PRATIK  Organism: Serratia marcescens (12-12-21 @ 18:03)      -  Amikacin: S <=16      -  Amoxicillin/Clavulanic Acid: R >16/8      -  Ampicillin: R >16 These ampicillin results predict results for amoxicillin      -  Ampicillin/Sulbactam: R >16/8 Enterobacter, Klebsiella aerogenes, Citrobacter, and Serratia may develop resistance during prolonged therapy (3-4 days)      -  Aztreonam: S <=4      -  Cefazolin: R >16 Enterobacter, Klebsiella aerogenes, Citrobacter, and Serratia may develop resistance during prolonged therapy (3-4 days)      -  Cefepime: S <=2      -  Cefoxitin: R <=8      -  Ceftriaxone: I 2 Enterobacter, Klebsiella aerogenes, Citrobacter, and Serratia may develop resistance during prolonged therapy      -  Ciprofloxacin: R >2      -  Ertapenem: S <=0.5      -  Gentamicin: S <=2      -  Levofloxacin: R 2      -  Meropenem: S <=1      -  Piperacillin/Tazobactam: S <=8      -  Tobramycin: S <=2      -  Trimethoprim/Sulfamethoxazole: S <=0.5/9.5      Method Type: PRATIK  Organism: Serratia marcescens  Pseudomonas aeruginosa (Carbapenem Resistant)  Stenotrophomonas maltophilia (12-12-21 @ 17:47)    Culture - Urine (collected 12-08-21 @ 17:55)  Source: Clean Catch Clean Catch (Midstream)  Final Report (12-09-21 @ 13:58):    <10,000 CFU/mL Normal Urogenital Elvira    Culture - Blood (collected 12-08-21 @ 15:24)  Source: .Blood Blood-Peripheral  Preliminary Report (12-09-21 @ 16:01):    No growth to date.    Culture - Blood (collected 12-08-21 @ 15:24)  Source: .Blood Blood-Peripheral  Preliminary Report (12-09-21 @ 16:01):    No growth to date.      Radiology: reviewed     Medications:  acetaminophen     Tablet .. 650 milliGRAM(s) Oral every 6 hours PRN  ALBUTerol    90 MICROgram(s) HFA Inhaler 2 Puff(s) Inhalation every 6 hours  chlorhexidine 0.12% Liquid 15 milliLiter(s) Oral Mucosa every 12 hours  dextrose 40% Gel 15 Gram(s) Oral once  dextrose 5%. 1000 milliLiter(s) IV Continuous <Continuous>  dextrose 5%. 1000 milliLiter(s) IV Continuous <Continuous>  dextrose 50% Injectable 25 Gram(s) IV Push once  dextrose 50% Injectable 25 Gram(s) IV Push once  dextrose 50% Injectable 12.5 Gram(s) IV Push once  glucagon  Injectable 1 milliGRAM(s) IntraMuscular once  heparin   Injectable 5000 Unit(s) SubCutaneous every 12 hours  insulin lispro (ADMELOG) corrective regimen sliding scale   SubCutaneous every 6 hours  levoFLOXacin IVPB 750 milliGRAM(s) IV Intermittent once  LORazepam   Injectable 1 milliGRAM(s) IV Push every 4 hours PRN  methocarbamol 250 milliGRAM(s) Oral two times a day  pantoprazole  Injectable 40 milliGRAM(s) IV Push every 12 hours  polyethylene glycol 3350 17 Gram(s) Oral every 12 hours  senna 2 Tablet(s) Oral at bedtime  simethicone 80 milliGRAM(s) Chew every 6 hours  sodium chloride 0.9%. 1000 milliLiter(s) IV Continuous <Continuous>  sucralfate suspension 1 Gram(s) Enteral Tube every 6 hours    Antimicrobials:  levoFLOXacin IVPB 750 milliGRAM(s) IV Intermittent once

## 2021-12-13 NOTE — PROGRESS NOTE ADULT - SUBJECTIVE AND OBJECTIVE BOX
Patient seen and examined;  Chart reviewed and events noted;   Patient resting comfortably; remains on antibiotics for suspected pneumonia  PEG fell out over weekend      MEDICATIONS  (STANDING):  ALBUTerol    90 MICROgram(s) HFA Inhaler 2 Puff(s) Inhalation every 6 hours  chlorhexidine 0.12% Liquid 15 milliLiter(s) Oral Mucosa every 12 hours  dextrose 40% Gel 15 Gram(s) Oral once  dextrose 5%. 1000 milliLiter(s) (50 mL/Hr) IV Continuous <Continuous>  glucagon  Injectable 1 milliGRAM(s) IntraMuscular once  heparin   Injectable 5000 Unit(s) SubCutaneous every 12 hours  insulin lispro (ADMELOG) corrective regimen sliding scale   SubCutaneous every 6 hours  methocarbamol 250 milliGRAM(s) Oral two times a day  pantoprazole  Injectable 40 milliGRAM(s) IV Push every 12 hours  piperacillin/tazobactam IVPB.. 3.375 Gram(s) IV Intermittent every 8 hours  polyethylene glycol 3350 17 Gram(s) Oral every 12 hours  potassium phosphate IVPB 15 milliMole(s) IV Intermittent once  senna 2 Tablet(s) Oral at bedtime  simethicone 80 milliGRAM(s) Chew every 6 hours  sodium chloride 0.9%. 1000 milliLiter(s) (50 mL/Hr) IV Continuous <Continuous>  sucralfate suspension 1 Gram(s) Enteral Tube every 6 hours    MEDICATIONS  (PRN):  acetaminophen     Tablet .. 650 milliGRAM(s) Oral every 6 hours PRN Temp greater or equal to 38C (100.4F), Mild Pain (1 - 3)  LORazepam   Injectable 1 milliGRAM(s) IV Push every 4 hours PRN Agitation      Vital Signs Last 24 Hrs  T(C): 36.7 (13 Dec 2021 06:46), Max: 36.8 (12 Dec 2021 08:00)  T(F): 98.1 (13 Dec 2021 06:46), Max: 98.3 (12 Dec 2021 08:00)  HR: 70 (13 Dec 2021 06:00) (58 - 130)  BP: 116/65 (13 Dec 2021 06:00) (105/54 - 194/76)  BP(mean): 80 (13 Dec 2021 06:00) (66 - 109)  RR: 18 (13 Dec 2021 06:00) (9 - 37)  SpO2: 95% (13 Dec 2021 06:00) (83% - 100%)    PHYSICAL EXAM  General: adult woman, not responsive to verbal stimuli  HEENT: clear oropharynx, anicteric sclera, pink conjunctivae  Neck: supple; + trache  CV: normal S1S2 with no murmur rubs or gallops  Lungs: clear to auscultation on anterior exam  Abdomen:+ distended; + bowel sounds; cannot asseshepato/splenomegaly  Ext: no clubbing cyanosis or edema; + contracutres of upper extremities  Skin: no rashes and no petichiae    LABS:                        8.9    5.60  )-----------( 236      ( 13 Dec 2021 06:20 )             30.5     Hemoglobin: 8.9 g/dL (12-13 @ 06:20)  Hemoglobin: 9.1 g/dL (12-12 @ 06:29)  Hemoglobin: 9.4 g/dL (12-11 @ 09:39)  Hemoglobin: 9.3 g/dL (12-10 @ 08:38)  Hemoglobin: 8.7 g/dL (12-09 @ 20:48)      12-13    142  |  108  |  27<H>  ----------------------------<  145<H>  4.4   |  25  |  1.03    Ca    8.3<L>      13 Dec 2021 06:20  Phos  1.8     12-13  Mg     2.1     12-13    TPro  7.0  /  Alb  2.0<L>  /  TBili  0.5  /  DBili  x   /  AST  28  /  ALT  32  /  AlkPhos  221<H>  12-11

## 2021-12-13 NOTE — PROGRESS NOTE ADULT - SUBJECTIVE AND OBJECTIVE BOX
Patient is a 76y Female whom presented to the hospital with ckd and manasa     PAST MEDICAL & SURGICAL HISTORY:  Dementia of frontal lobe type    Aphasic stroke    Diabetes mellitus    Respiratory failure    Hypertension    GERD (gastroesophageal reflux disease)    Constipation    Respiratory failure    CVA (cerebral vascular accident)    HTN (hypertension)    DM (diabetes mellitus)    Advanced dementia    COVID-19 virus detected    Quadriplegia    Pneumonia    Type II diabetes mellitus    Hx of appendectomy    Gastrostomy in place    Tracheostomy in place    Tracheostomy tube present    Feeding by G-tube        MEDICATIONS  (STANDING):  ALBUTerol    90 MICROgram(s) HFA Inhaler 2 Puff(s) Inhalation every 6 hours  chlorhexidine 0.12% Liquid 15 milliLiter(s) Oral Mucosa every 12 hours  dextrose 40% Gel 15 Gram(s) Oral once  dextrose 5%. 1000 milliLiter(s) (50 mL/Hr) IV Continuous <Continuous>  dextrose 5%. 1000 milliLiter(s) (100 mL/Hr) IV Continuous <Continuous>  dextrose 50% Injectable 25 Gram(s) IV Push once  dextrose 50% Injectable 12.5 Gram(s) IV Push once  dextrose 50% Injectable 25 Gram(s) IV Push once  glucagon  Injectable 1 milliGRAM(s) IntraMuscular once  insulin lispro (ADMELOG) corrective regimen sliding scale   SubCutaneous every 6 hours  lactated ringers. 1000 milliLiter(s) (100 mL/Hr) IV Continuous <Continuous>  methocarbamol 250 milliGRAM(s) Oral two times a day  pantoprazole  Injectable 40 milliGRAM(s) IV Push every 12 hours  piperacillin/tazobactam IVPB.. 3.375 Gram(s) IV Intermittent every 8 hours  polyethylene glycol 3350 17 Gram(s) Oral every 12 hours  senna 2 Tablet(s) Oral at bedtime  simethicone 80 milliGRAM(s) Chew every 6 hours  sucralfate 1 Gram(s) Oral every 6 hours  tiotropium 18 MICROgram(s) Capsule 1 Capsule(s) Inhalation daily  vancomycin  IVPB 750 milliGRAM(s) IV Intermittent every 12 hours      Allergies    codeine (Hives)    Intolerances        SOCIAL HISTORY:  Denies ETOh,Smoking,     FAMILY HISTORY:  No pertinent family history in first degree relatives        REVIEW OF SYSTEMS:    unable to obtained a good review system                                                                                                            8.9    5.60  )-----------( 236      ( 13 Dec 2021 06:20 )             30.5       CBC Full  -  ( 13 Dec 2021 06:20 )  WBC Count : 5.60 K/uL  RBC Count : 3.56 M/uL  Hemoglobin : 8.9 g/dL  Hematocrit : 30.5 %  Platelet Count - Automated : 236 K/uL  Mean Cell Volume : 85.7 fl  Mean Cell Hemoglobin : 25.0 pg  Mean Cell Hemoglobin Concentration : 29.2 gm/dL  Auto Neutrophil # : x  Auto Lymphocyte # : x  Auto Monocyte # : x  Auto Eosinophil # : x  Auto Basophil # : x  Auto Neutrophil % : x  Auto Lymphocyte % : x  Auto Monocyte % : x  Auto Eosinophil % : x  Auto Basophil % : x      12-13    142  |  108  |  27<H>  ----------------------------<  145<H>  4.4   |  25  |  1.03    Ca    8.3<L>      13 Dec 2021 06:20  Phos  1.8     12-13  Mg     2.1     12-13        CAPILLARY BLOOD GLUCOSE      POCT Blood Glucose.: 119 mg/dL (13 Dec 2021 16:48)  POCT Blood Glucose.: 112 mg/dL (13 Dec 2021 11:34)  POCT Blood Glucose.: 167 mg/dL (13 Dec 2021 06:45)      Vital Signs Last 24 Hrs  T(C): 36.7 (13 Dec 2021 20:00), Max: 36.8 (12 Dec 2021 23:57)  T(F): 98.1 (13 Dec 2021 20:00), Max: 98.3 (12 Dec 2021 23:57)  HR: 61 (13 Dec 2021 20:45) (56 - 130)  BP: 127/58 (13 Dec 2021 20:00) (101/57 - 194/76)  BP(mean): 79 (13 Dec 2021 20:00) (71 - 109)  RR: 0 (13 Dec 2021 20:00) (0 - 28)  SpO2: 100% (13 Dec 2021 20:45) (83% - 100%)                   PHYSICAL EXAM:    Constitutional: NAD  HEENT: conjunctive   clear   Neck:  No JVD  Respiratory: pos decrease bs pos trach  Cardiovascular: S1 and S2  Gastrointestinal: BS+, soft, pos peg   Extremities: No peripheral edema

## 2021-12-13 NOTE — PROGRESS NOTE ADULT - ASSESSMENT
77 yo F sent from [facility] home with abnormal labs. Patient unable to contribute to history. Had labs drawn this afternoon and reportedly has a Hgb of 6. Patient does not have a vaughn catheter. No report of fever, vomiting, bleeding. Receiving IV Zosyn through PICC line rt arm    Ativan Dose adjusted for agitation  2 diff GI MDs notes reviewed - not a candidate for EGD  on TF - on IVF -   on Hep sq dvt p  GI follow up - serial Hgb  ct imaging reviewed - poss PNA -   Midodrine added -   vs noted  HD reviewed       HCP  Pt is full code  CCM and Cardio notes reviewed  lives in SNF  vent dep -   end stage dementia  trach and peg  HOB elev  asp prec  oral hygiene  skin care  assist with all needs - ADL  work up in progress

## 2021-12-13 NOTE — PROGRESS NOTE ADULT - SUBJECTIVE AND OBJECTIVE BOX
BRE[facility]EC[facility]    Allergies    codeine (Hives)    Intolerances        PAST MEDICAL & SURGICAL HISTORY:  Dementia of frontal lobe type    Aphasic stroke    Diabetes mellitus    Respiratory failure    Hypertension    GERD (gastroesophageal reflux disease)    Constipation    Respiratory failure    CVA (cerebral vascular accident)    HTN (hypertension)    DM (diabetes mellitus)    Advanced dementia    COVID-19 virus detected    Quadriplegia    Pneumonia    Type II diabetes mellitus    Hx of appendectomy    Gastrostomy in place    Tracheostomy in place    Tracheostomy tube present    Feeding by G-tube        FAMILY HISTORY:  No pertinent family history in first degree relatives        Home Medications:  albuterol 90 mcg/inh inhalation aerosol: 2 puff(s) inhaled every 6 hours (08 Dec 2021 16:51)  Bacid (LAC) oral tablet: 2 tab(s) by gastrostomy tube once a day (08 Dec 2021 16:51)  Carafate 1 g/10 mL oral suspension: 10 milliliter(s) by gastrostomy tube 4 times a day (before meals and at bedtime) for 14 days (Started 6/4/21) (08 Dec 2021 16:51)  chlorhexidine 0.12% mucous membrane liquid: 15 milliliter(s) mucous membrane 2 times a day (08 Dec 2021 16:51)  insulin lispro 100 units/mL injectable solution:  injectable; sliding scale coverage  (08 Dec 2021 16:51)  ipratropium-albuterol 0.5 mg-2.5 mg/3 mL inhalation solution: 3 milliliter(s) inhaled 4 times a day (08 Dec 2021 16:51)  LORazepam 1 mg oral tablet: 1 tab(s) by gastrostomy tube 3 times a day, As Needed (08 Dec 2021 16:51)  methocarbamol: 250 milligram(s) by gastrostomy tube 2 times a day (08 Dec 2021 16:51)  pantoprazole 40 mg oral granule, delayed release: 40 milligram(s) by gastrostomy tube 2 times a day (08 Dec 2021 16:51)  polyethylene glycol 3350 oral powder for reconstitution: 17 gram(s) orally every 12 hours (08 Dec 2021 16:51)  ProAir HFA 90 mcg/inh inhalation aerosol: 2 puff(s) inhaled every 6 hours (08 Dec 2021 16:51)  senna 8.6 mg oral tablet: 3 tab(s) by gastrostomy tube once a day (at bedtime) (08 Dec 2021 16:51)  simethicone 80 mg oral tablet, chewable: 1 tab(s) orally every 6 hours (08 Dec 2021 16:51)  Tylenol 325 mg oral tablet: 2 tab(s) by gastrostomy tube once a day; 60 minutes prior to dressing change  (08 Dec 2021 16:51)  Zosyn 3 g-0.375 g/50 mL intravenous solution: intravenous every 8 hours (08 Dec 2021 16:51)      MEDICATIONS  (STANDING):  ALBUTerol    90 MICROgram(s) HFA Inhaler 2 Puff(s) Inhalation every 6 hours  chlorhexidine 0.12% Liquid 15 milliLiter(s) Oral Mucosa every 12 hours  dextrose 40% Gel 15 Gram(s) Oral once  dextrose 5%. 1000 milliLiter(s) (50 mL/Hr) IV Continuous <Continuous>  dextrose 5%. 1000 milliLiter(s) (100 mL/Hr) IV Continuous <Continuous>  dextrose 50% Injectable 25 Gram(s) IV Push once  dextrose 50% Injectable 12.5 Gram(s) IV Push once  dextrose 50% Injectable 25 Gram(s) IV Push once  glucagon  Injectable 1 milliGRAM(s) IntraMuscular once  heparin   Injectable 5000 Unit(s) SubCutaneous every 12 hours  insulin lispro (ADMELOG) corrective regimen sliding scale   SubCutaneous every 6 hours  methocarbamol 250 milliGRAM(s) Oral two times a day  pantoprazole  Injectable 40 milliGRAM(s) IV Push every 12 hours  piperacillin/tazobactam IVPB.. 3.375 Gram(s) IV Intermittent every 8 hours  polyethylene glycol 3350 17 Gram(s) Oral every 12 hours  potassium phosphate IVPB 15 milliMole(s) IV Intermittent once  senna 2 Tablet(s) Oral at bedtime  simethicone 80 milliGRAM(s) Chew every 6 hours  sodium chloride 0.9%. 1000 milliLiter(s) (50 mL/Hr) IV Continuous <Continuous>  sucralfate suspension 1 Gram(s) Enteral Tube every 6 hours    MEDICATIONS  (PRN):  acetaminophen     Tablet .. 650 milliGRAM(s) Oral every 6 hours PRN Temp greater or equal to 38C (100.4F), Mild Pain (1 - 3)  LORazepam   Injectable 1 milliGRAM(s) IV Push every 4 hours PRN Agitation      Diet, NPO with Tube Feed:   Tube Feeding Modality: Gastrostomy  Glucerna 1.5 Horacio  Total Volume for 24 Hours (mL): 1000  Continuous  Starting Tube Feed Rate mL per Hour: 20  Increase Tube Feed Rate by (mL): 10     Every 6 hours  Until Goal Tube Feed Rate (mL per Hour): 50  Tube Feed Duration (in Hours): 20  Tube Feed Start Time: 17:00 (12-09-21 @ 16:49) [Active]          Vital Signs Last 24 Hrs  T(C): 36.8 (13 Dec 2021 07:35), Max: 36.8 (12 Dec 2021 15:50)  T(F): 98.2 (13 Dec 2021 07:35), Max: 98.3 (12 Dec 2021 15:50)  HR: 86 (13 Dec 2021 07:50) (58 - 130)  BP: 116/65 (13 Dec 2021 06:00) (105/54 - 194/76)  BP(mean): 80 (13 Dec 2021 06:00) (66 - 109)  RR: 18 (13 Dec 2021 06:00) (9 - 37)  SpO2: 98% (13 Dec 2021 07:50) (83% - 100%)      12-12-21 @ 07:01  -  12-13-21 @ 07:00  --------------------------------------------------------  IN: 2925 mL / OUT: 250 mL / NET: 2675 mL        Mode: AC/ CMV (Assist Control/ Continuous Mandatory Ventilation), RR (machine): 18, TV (machine): 400, FiO2: 40, PEEP: 5, ITime: 1, MAP: 11, PIP: 35      LABS:                        8.9    5.60  )-----------( 236      ( 13 Dec 2021 06:20 )             30.5     12-13    142  |  108  |  27<H>  ----------------------------<  145<H>  4.4   |  25  |  1.03    Ca    8.3<L>      13 Dec 2021 06:20  Phos  1.8     12-13  Mg     2.1     12-13    TPro  7.0  /  Alb  2.0<L>  /  TBili  0.5  /  DBili  x   /  AST  28  /  ALT  32  /  AlkPhos  221<H>  12-11              WBC:  WBC Count: 5.60 K/uL (12-13 @ 06:20)  WBC Count: 5.82 K/uL (12-12 @ 06:29)  WBC Count: 6.98 K/uL (12-11 @ 09:39)  WBC Count: 13.27 K/uL (12-10 @ 08:38)  WBC Count: 9.43 K/uL (12-09 @ 20:48)      MICROBIOLOGY:  RECENT CULTURES:  12-11 .Sputum Sputum Serratia marcescens  Pseudomonas aeruginosa (Carbapenem Resistant)  Stenotrophomonas maltophilia   Moderate polymorphonuclear leukocytes per low power field  Rare Squamous epithelial cells per low power field  Numerous Gram Negative Rods seen per oil power field   Few Pseudomonas aeruginosa (Carbapenem Resistant)  Moderate Serratia marcescens  Moderate Stenotrophomonas maltophilia  Normal Respiratory Elvira present    12-08 Clean Catch Clean Catch (Midstream) XXXX XXXX   <10,000 CFU/mL Normal Urogenital Elvira    12-08 .Blood Blood-Peripheral XXXX XXXX   No growth to date.                    Sodium:  Sodium, Serum: 142 mmol/L (12-13 @ 06:20)  Sodium, Serum: 141 mmol/L (12-12 @ 06:29)  Sodium, Serum: 137 mmol/L (12-11 @ 09:40)  Sodium, Serum: 136 mmol/L (12-10 @ 08:38)      1.03 mg/dL 12-13 @ 06:20  1.13 mg/dL 12-12 @ 06:29  1.22 mg/dL 12-11 @ 09:40  1.47 mg/dL 12-10 @ 08:38      Hemoglobin:  Hemoglobin: 8.9 g/dL (12-13 @ 06:20)  Hemoglobin: 9.1 g/dL (12-12 @ 06:29)  Hemoglobin: 9.4 g/dL (12-11 @ 09:39)  Hemoglobin: 9.3 g/dL (12-10 @ 08:38)  Hemoglobin: 8.7 g/dL (12-09 @ 20:48)      Platelets: Platelet Count - Automated: 236 K/uL (12-13 @ 06:20)  Platelet Count - Automated: 235 K/uL (12-12 @ 06:29)  Platelet Count - Automated: 224 K/uL (12-11 @ 09:39)  Platelet Count - Automated: 232 K/uL (12-10 @ 08:38)  Platelet Count - Automated: 183 K/uL (12-09 @ 20:48)      LIVER FUNCTIONS - ( 11 Dec 2021 09:40 )  Alb: 2.0 g/dL / Pro: 7.0 g/dL / ALK PHOS: 221 U/L / ALT: 32 U/L DA / AST: 28 U/L / GGT: x                 RADIOLOGY & ADDITIONAL STUDIES:      MICROBIOLOGY:  RECENT CULTURES:  12-11 .Sputum Sputum Serratia marcescens  Pseudomonas aeruginosa (Carbapenem Resistant)  Stenotrophomonas maltophilia   Moderate polymorphonuclear leukocytes per low power field  Rare Squamous epithelial cells per low power field  Numerous Gram Negative Rods seen per oil power field   Few Pseudomonas aeruginosa (Carbapenem Resistant)  Moderate Serratia marcescens  Moderate Stenotrophomonas maltophilia  Normal Respiratory Elvira present    12-08 Clean Catch Clean Catch (Midstream) XXXX XXXX   <10,000 CFU/mL Normal Urogenital Elvira    12-08 .Blood Blood-Peripheral XXXX XXXX   No growth to date.

## 2021-12-13 NOTE — PROGRESS NOTE ADULT - SUBJECTIVE AND OBJECTIVE BOX
s/p replacement of G tube at bedside   placed 18f Peg percutaneously w/ 20cc balloon   able to instill and aspirate water w/o difficulty   check AXR-    resume feeds after X ray

## 2021-12-13 NOTE — PROGRESS NOTE ADULT - SUBJECTIVE AND OBJECTIVE BOX
Neurology follow up note    BREA [id number]      Interval History:    Patient resting in bed     Allergies    codeine (Hives)    Intolerances        MEDICATIONS    acetaminophen     Tablet .. 650 milliGRAM(s) Oral every 6 hours PRN  ALBUTerol    90 MICROgram(s) HFA Inhaler 2 Puff(s) Inhalation every 6 hours  chlorhexidine 0.12% Liquid 15 milliLiter(s) Oral Mucosa every 12 hours  dextrose 40% Gel 15 Gram(s) Oral once  dextrose 5%. 1000 milliLiter(s) IV Continuous <Continuous>  dextrose 5%. 1000 milliLiter(s) IV Continuous <Continuous>  dextrose 50% Injectable 25 Gram(s) IV Push once  dextrose 50% Injectable 12.5 Gram(s) IV Push once  dextrose 50% Injectable 25 Gram(s) IV Push once  glucagon  Injectable 1 milliGRAM(s) IntraMuscular once  heparin   Injectable 5000 Unit(s) SubCutaneous every 12 hours  insulin lispro (ADMELOG) corrective regimen sliding scale   SubCutaneous every 6 hours  LORazepam   Injectable 1 milliGRAM(s) IV Push every 4 hours PRN  methocarbamol 250 milliGRAM(s) Oral two times a day  pantoprazole  Injectable 40 milliGRAM(s) IV Push every 12 hours  polyethylene glycol 3350 17 Gram(s) Oral every 12 hours  senna 2 Tablet(s) Oral at bedtime  simethicone 80 milliGRAM(s) Chew every 6 hours  sodium chloride 0.9%. 1000 milliLiter(s) IV Continuous <Continuous>  sucralfate suspension 1 Gram(s) Enteral Tube every 6 hours              Vital Signs Last 24 Hrs  T(C): 36.6 (13 Dec 2021 12:00), Max: 36.8 (12 Dec 2021 23:57)  T(F): 97.8 (13 Dec 2021 12:00), Max: 98.3 (12 Dec 2021 23:57)  HR: 63 (13 Dec 2021 14:00) (58 - 130)  BP: 139/64 (13 Dec 2021 14:00) (101/57 - 194/76)  BP(mean): 87 (13 Dec 2021 14:00) (71 - 109)  RR: 18 (13 Dec 2021 14:00) (14 - 30)  SpO2: 100% (13 Dec 2021 14:00) (83% - 100%)      REVIEW OF SYSTEMS:  Could not be obtained secondary to the patient being nonverbal.    PHYSICAL EXAMINATION:    HEENT:  Head:  Normocephalic.  Eyes:  No scleral icterus.  Ears:  Hard to evaluate secondary to the patient being nonverbal.  NECK:  Had increased tone, tracheostomy was in place.  RESPIRATORY:  Decreased breath sounds bilaterally.  CARDIOVASCULAR:  S1 and S2 heard.  ABDOMEN:  Soft and nontender.  EXTREMITIES:  No clubbing or cyanosis was noted.      NEUROLOGIC:  The patient was resting in bed with eyes open.  Upon stimulation of the patient, her eyes would roll up, with subtle shaking.  Speech:  Nonverbal.  Tone in all four extremities increased.  Bilateral upper extremities were in a flexed position.  Bilateral lower extremities were in the straight position.            LABS:  CBC Full  -  ( 13 Dec 2021 06:20 )  WBC Count : 5.60 K/uL  RBC Count : 3.56 M/uL  Hemoglobin : 8.9 g/dL  Hematocrit : 30.5 %  Platelet Count - Automated : 236 K/uL  Mean Cell Volume : 85.7 fl  Mean Cell Hemoglobin : 25.0 pg  Mean Cell Hemoglobin Concentration : 29.2 gm/dL  Auto Neutrophil # : x  Auto Lymphocyte # : x  Auto Monocyte # : x  Auto Eosinophil # : x  Auto Basophil # : x  Auto Neutrophil % : x  Auto Lymphocyte % : x  Auto Monocyte % : x  Auto Eosinophil % : x  Auto Basophil % : x      12-13    142  |  108  |  27<H>  ----------------------------<  145<H>  4.4   |  25  |  1.03    Ca    8.3<L>      13 Dec 2021 06:20  Phos  1.8     12-13  Mg     2.1     12-13      Hemoglobin A1C:       Vitamin B12         RADIOLOGY    ANALYSIS AND PLAN:  This is a 76-year-old with episodes of altered mental status and a history of shaking.  For altered mental status, metabolic encephalopathy secondary to underlying infectious type process and possibly underlying pneumonia.  For episodes of abnormal movements, as per my conversation with the spouse in the past, these were nonepileptic in nature.  As per my conversation with him in the past, does not want any antiseizure medications.  The patient was tried before in the past on muscle relaxants to see if that will help with tone, with no significant changes.  For history of diabetes, strict control of blood sugars.  events noted   monition  respiratory status as needed     The spouse's name is Marciano, his telephone number is [phone] 12/10    Greater than 30 minutes of time was spent with the patient, plan of care, reviewing data, and speaking to the multidisciplinary healthcare team with greater than 50% of that time in counseling and care coordination.    Thank you for the courtesy of this consultation.

## 2021-12-14 ENCOUNTER — TRANSCRIPTION ENCOUNTER (OUTPATIENT)
Age: 76
End: 2021-12-14

## 2021-12-14 LAB
ALBUMIN SERPL ELPH-MCNC: 2.1 G/DL — LOW (ref 3.3–5)
ALP SERPL-CCNC: 132 U/L — HIGH (ref 30–120)
ALT FLD-CCNC: 21 U/L DA — SIGNIFICANT CHANGE UP (ref 10–60)
ANION GAP SERPL CALC-SCNC: 9 MMOL/L — SIGNIFICANT CHANGE UP (ref 5–17)
AST SERPL-CCNC: 20 U/L — SIGNIFICANT CHANGE UP (ref 10–40)
BASOPHILS # BLD AUTO: 0.04 K/UL — SIGNIFICANT CHANGE UP (ref 0–0.2)
BASOPHILS NFR BLD AUTO: 0.2 % — SIGNIFICANT CHANGE UP (ref 0–2)
BILIRUB SERPL-MCNC: 0.3 MG/DL — SIGNIFICANT CHANGE UP (ref 0.2–1.2)
BUN SERPL-MCNC: 25 MG/DL — HIGH (ref 7–23)
CALCIUM SERPL-MCNC: 8.9 MG/DL — SIGNIFICANT CHANGE UP (ref 8.4–10.5)
CHLORIDE SERPL-SCNC: 109 MMOL/L — HIGH (ref 96–108)
CO2 SERPL-SCNC: 24 MMOL/L — SIGNIFICANT CHANGE UP (ref 22–31)
CREAT SERPL-MCNC: 1.2 MG/DL — SIGNIFICANT CHANGE UP (ref 0.5–1.3)
EOSINOPHIL # BLD AUTO: 0.01 K/UL — SIGNIFICANT CHANGE UP (ref 0–0.5)
EOSINOPHIL NFR BLD AUTO: 0.1 % — SIGNIFICANT CHANGE UP (ref 0–6)
GLUCOSE BLDC GLUCOMTR-MCNC: 109 MG/DL — HIGH (ref 70–99)
GLUCOSE BLDC GLUCOMTR-MCNC: 114 MG/DL — HIGH (ref 70–99)
GLUCOSE BLDC GLUCOMTR-MCNC: 129 MG/DL — HIGH (ref 70–99)
GLUCOSE BLDC GLUCOMTR-MCNC: 139 MG/DL — HIGH (ref 70–99)
GLUCOSE BLDC GLUCOMTR-MCNC: 155 MG/DL — HIGH (ref 70–99)
GLUCOSE BLDC GLUCOMTR-MCNC: 199 MG/DL — HIGH (ref 70–99)
GLUCOSE BLDC GLUCOMTR-MCNC: 201 MG/DL — HIGH (ref 70–99)
GLUCOSE SERPL-MCNC: 201 MG/DL — HIGH (ref 70–99)
HCT VFR BLD CALC: 32.9 % — LOW (ref 34.5–45)
HGB BLD-MCNC: 9.6 G/DL — LOW (ref 11.5–15.5)
IMM GRANULOCYTES NFR BLD AUTO: 0.5 % — SIGNIFICANT CHANGE UP (ref 0–1.5)
LYMPHOCYTES # BLD AUTO: 0.37 K/UL — LOW (ref 1–3.3)
LYMPHOCYTES # BLD AUTO: 2.1 % — LOW (ref 13–44)
MAGNESIUM SERPL-MCNC: 2 MG/DL — SIGNIFICANT CHANGE UP (ref 1.6–2.6)
MCHC RBC-ENTMCNC: 25.5 PG — LOW (ref 27–34)
MCHC RBC-ENTMCNC: 29.2 GM/DL — LOW (ref 32–36)
MCV RBC AUTO: 87.3 FL — SIGNIFICANT CHANGE UP (ref 80–100)
MONOCYTES # BLD AUTO: 0.82 K/UL — SIGNIFICANT CHANGE UP (ref 0–0.9)
MONOCYTES NFR BLD AUTO: 4.7 % — SIGNIFICANT CHANGE UP (ref 2–14)
NEUTROPHILS # BLD AUTO: 16.02 K/UL — HIGH (ref 1.8–7.4)
NEUTROPHILS NFR BLD AUTO: 92.4 % — HIGH (ref 43–77)
NRBC # BLD: 0 /100 WBCS — SIGNIFICANT CHANGE UP (ref 0–0)
PHOSPHATE SERPL-MCNC: 3.3 MG/DL — SIGNIFICANT CHANGE UP (ref 2.5–4.5)
PLATELET # BLD AUTO: 332 K/UL — SIGNIFICANT CHANGE UP (ref 150–400)
POTASSIUM SERPL-MCNC: 4.9 MMOL/L — SIGNIFICANT CHANGE UP (ref 3.5–5.3)
POTASSIUM SERPL-SCNC: 4.9 MMOL/L — SIGNIFICANT CHANGE UP (ref 3.5–5.3)
PROT SERPL-MCNC: 6.7 G/DL — SIGNIFICANT CHANGE UP (ref 6–8.3)
RBC # BLD: 3.77 M/UL — LOW (ref 3.8–5.2)
RBC # FLD: 17.4 % — HIGH (ref 10.3–14.5)
SODIUM SERPL-SCNC: 142 MMOL/L — SIGNIFICANT CHANGE UP (ref 135–145)
WBC # BLD: 17.35 K/UL — HIGH (ref 3.8–10.5)
WBC # FLD AUTO: 17.35 K/UL — HIGH (ref 3.8–10.5)

## 2021-12-14 PROCEDURE — 99291 CRITICAL CARE FIRST HOUR: CPT

## 2021-12-14 PROCEDURE — 71045 X-RAY EXAM CHEST 1 VIEW: CPT | Mod: 26

## 2021-12-14 RX ORDER — PIPERACILLIN AND TAZOBACTAM 4; .5 G/20ML; G/20ML
0 INJECTION, POWDER, LYOPHILIZED, FOR SOLUTION INTRAVENOUS
Qty: 0 | Refills: 0 | DISCHARGE
Start: 2021-12-14

## 2021-12-14 RX ORDER — FENTANYL CITRATE 50 UG/ML
25 INJECTION INTRAVENOUS ONCE
Refills: 0 | Status: DISCONTINUED | OUTPATIENT
Start: 2021-12-14 | End: 2021-12-14

## 2021-12-14 RX ORDER — METHOCARBAMOL 500 MG/1
500 TABLET, FILM COATED ORAL
Refills: 0 | Status: DISCONTINUED | OUTPATIENT
Start: 2021-12-14 | End: 2021-12-20

## 2021-12-14 RX ORDER — METHOCARBAMOL 500 MG/1
1 TABLET, FILM COATED ORAL
Qty: 0 | Refills: 0 | DISCHARGE
Start: 2021-12-14

## 2021-12-14 RX ORDER — FENTANYL CITRATE 50 UG/ML
50 INJECTION INTRAVENOUS ONCE
Refills: 0 | Status: DISCONTINUED | OUTPATIENT
Start: 2021-12-14 | End: 2021-12-14

## 2021-12-14 RX ORDER — FENTANYL CITRATE 50 UG/ML
100 INJECTION INTRAVENOUS ONCE
Refills: 0 | Status: DISCONTINUED | OUTPATIENT
Start: 2021-12-14 | End: 2021-12-14

## 2021-12-14 RX ORDER — MIDAZOLAM HYDROCHLORIDE 1 MG/ML
4 INJECTION, SOLUTION INTRAMUSCULAR; INTRAVENOUS ONCE
Refills: 0 | Status: DISCONTINUED | OUTPATIENT
Start: 2021-12-14 | End: 2021-12-14

## 2021-12-14 RX ORDER — PROPOFOL 10 MG/ML
5 INJECTION, EMULSION INTRAVENOUS
Qty: 1000 | Refills: 0 | Status: DISCONTINUED | OUTPATIENT
Start: 2021-12-14 | End: 2021-12-14

## 2021-12-14 RX ORDER — MIDAZOLAM HYDROCHLORIDE 1 MG/ML
2 INJECTION, SOLUTION INTRAMUSCULAR; INTRAVENOUS ONCE
Refills: 0 | Status: DISCONTINUED | OUTPATIENT
Start: 2021-12-14 | End: 2021-12-14

## 2021-12-14 RX ORDER — FUROSEMIDE 40 MG
40 TABLET ORAL ONCE
Refills: 0 | Status: COMPLETED | OUTPATIENT
Start: 2021-12-14 | End: 2021-12-14

## 2021-12-14 RX ORDER — PIPERACILLIN AND TAZOBACTAM 4; .5 G/20ML; G/20ML
3.38 INJECTION, POWDER, LYOPHILIZED, FOR SOLUTION INTRAVENOUS EVERY 8 HOURS
Refills: 0 | Status: DISCONTINUED | OUTPATIENT
Start: 2021-12-14 | End: 2021-12-20

## 2021-12-14 RX ORDER — PIPERACILLIN AND TAZOBACTAM 4; .5 G/20ML; G/20ML
3.38 INJECTION, POWDER, LYOPHILIZED, FOR SOLUTION INTRAVENOUS ONCE
Refills: 0 | Status: COMPLETED | OUTPATIENT
Start: 2021-12-14 | End: 2021-12-14

## 2021-12-14 RX ORDER — MIDAZOLAM HYDROCHLORIDE 1 MG/ML
0.02 INJECTION, SOLUTION INTRAMUSCULAR; INTRAVENOUS
Qty: 100 | Refills: 0 | Status: DISCONTINUED | OUTPATIENT
Start: 2021-12-14 | End: 2021-12-16

## 2021-12-14 RX ADMIN — ALBUTEROL 2 PUFF(S): 90 AEROSOL, METERED ORAL at 18:45

## 2021-12-14 RX ADMIN — ALBUTEROL 2 PUFF(S): 90 AEROSOL, METERED ORAL at 13:54

## 2021-12-14 RX ADMIN — SENNA PLUS 2 TABLET(S): 8.6 TABLET ORAL at 23:02

## 2021-12-14 RX ADMIN — PANTOPRAZOLE SODIUM 40 MILLIGRAM(S): 20 TABLET, DELAYED RELEASE ORAL at 18:54

## 2021-12-14 RX ADMIN — Medication 2: at 12:10

## 2021-12-14 RX ADMIN — HEPARIN SODIUM 5000 UNIT(S): 5000 INJECTION INTRAVENOUS; SUBCUTANEOUS at 05:56

## 2021-12-14 RX ADMIN — Medication 1 MILLIGRAM(S): at 16:44

## 2021-12-14 RX ADMIN — SIMETHICONE 80 MILLIGRAM(S): 80 TABLET, CHEWABLE ORAL at 18:57

## 2021-12-14 RX ADMIN — Medication 1 GRAM(S): at 05:57

## 2021-12-14 RX ADMIN — PIPERACILLIN AND TAZOBACTAM 200 GRAM(S): 4; .5 INJECTION, POWDER, LYOPHILIZED, FOR SOLUTION INTRAVENOUS at 18:54

## 2021-12-14 RX ADMIN — PROPOFOL 1.33 MICROGRAM(S)/KG/MIN: 10 INJECTION, EMULSION INTRAVENOUS at 17:23

## 2021-12-14 RX ADMIN — MIDAZOLAM HYDROCHLORIDE 0.89 MG/KG/HR: 1 INJECTION, SOLUTION INTRAMUSCULAR; INTRAVENOUS at 19:08

## 2021-12-14 RX ADMIN — FENTANYL CITRATE 100 MICROGRAM(S): 50 INJECTION INTRAVENOUS at 13:20

## 2021-12-14 RX ADMIN — PANTOPRAZOLE SODIUM 40 MILLIGRAM(S): 20 TABLET, DELAYED RELEASE ORAL at 05:57

## 2021-12-14 RX ADMIN — SIMETHICONE 80 MILLIGRAM(S): 80 TABLET, CHEWABLE ORAL at 23:03

## 2021-12-14 RX ADMIN — Medication 1 MILLIGRAM(S): at 11:41

## 2021-12-14 RX ADMIN — MIDAZOLAM HYDROCHLORIDE 2 MILLIGRAM(S): 1 INJECTION, SOLUTION INTRAMUSCULAR; INTRAVENOUS at 13:25

## 2021-12-14 RX ADMIN — Medication 2 MILLIGRAM(S): at 00:26

## 2021-12-14 RX ADMIN — HEPARIN SODIUM 5000 UNIT(S): 5000 INJECTION INTRAVENOUS; SUBCUTANEOUS at 18:54

## 2021-12-14 RX ADMIN — SIMETHICONE 80 MILLIGRAM(S): 80 TABLET, CHEWABLE ORAL at 05:56

## 2021-12-14 RX ADMIN — Medication 1 GRAM(S): at 23:03

## 2021-12-14 RX ADMIN — Medication 1 GRAM(S): at 11:41

## 2021-12-14 RX ADMIN — FENTANYL CITRATE 25 MICROGRAM(S): 50 INJECTION INTRAVENOUS at 12:27

## 2021-12-14 RX ADMIN — FENTANYL CITRATE 50 MICROGRAM(S): 50 INJECTION INTRAVENOUS at 16:59

## 2021-12-14 RX ADMIN — SIMETHICONE 80 MILLIGRAM(S): 80 TABLET, CHEWABLE ORAL at 11:41

## 2021-12-14 RX ADMIN — Medication 40 MILLIGRAM(S): at 18:53

## 2021-12-14 RX ADMIN — METHOCARBAMOL 500 MILLIGRAM(S): 500 TABLET, FILM COATED ORAL at 18:56

## 2021-12-14 RX ADMIN — Medication 1 GRAM(S): at 18:57

## 2021-12-14 RX ADMIN — ALBUTEROL 2 PUFF(S): 90 AEROSOL, METERED ORAL at 01:09

## 2021-12-14 RX ADMIN — ALBUTEROL 2 PUFF(S): 90 AEROSOL, METERED ORAL at 06:41

## 2021-12-14 RX ADMIN — CHLORHEXIDINE GLUCONATE 15 MILLILITER(S): 213 SOLUTION TOPICAL at 05:56

## 2021-12-14 RX ADMIN — FENTANYL CITRATE 25 MICROGRAM(S): 50 INJECTION INTRAVENOUS at 12:25

## 2021-12-14 RX ADMIN — Medication 1 MILLIGRAM(S): at 05:00

## 2021-12-14 RX ADMIN — METHOCARBAMOL 250 MILLIGRAM(S): 500 TABLET, FILM COATED ORAL at 05:56

## 2021-12-14 RX ADMIN — MIDAZOLAM HYDROCHLORIDE 2 MILLIGRAM(S): 1 INJECTION, SOLUTION INTRAMUSCULAR; INTRAVENOUS at 19:11

## 2021-12-14 RX ADMIN — Medication 1 MILLIGRAM(S): at 10:39

## 2021-12-14 NOTE — PROGRESS NOTE ADULT - SUBJECTIVE AND OBJECTIVE BOX
Chief Complaint: Abnormal labs    Interval Events: No events overnight    Review of Systems:  Unable to obtain    Physical Exam:  Vital Signs Last 24 Hrs  T(C): 36.7 (14 Dec 2021 12:34), Max: 36.9 (14 Dec 2021 03:36)  T(F): 98.1 (14 Dec 2021 12:34), Max: 98.4 (14 Dec 2021 03:36)  HR: 62 (14 Dec 2021 10:26) (53 - 77)  BP: 124/57 (14 Dec 2021 10:00) (102/54 - 143/85)  BP(mean): 78 (14 Dec 2021 10:00) (69 - 102)  RR: 14 (14 Dec 2021 10:00) (0 - 18)  SpO2: 100% (14 Dec 2021 10:26) (98% - 100%)  General: Unresponsive  HEENT: Trach  Neck: No JVD, no carotid bruit  Lungs: CTAB  CV: RRR, nl S1/S2, no M/R/G  Abdomen: S/NT/ND, +BS  Extremities: No LE edema, no cyanosis  Neuro: AAOx0  Skin: No rash    Labs:    12-13    142  |  108  |  27<H>  ----------------------------<  145<H>  4.4   |  25  |  1.03    Ca    8.3<L>      13 Dec 2021 06:20  Phos  1.8     12-13  Mg     2.1     12-13                          8.9    5.60  )-----------( 236      ( 13 Dec 2021 06:20 )             30.5       Telemetry: Sinus rhythm

## 2021-12-14 NOTE — DISCHARGE NOTE NURSING/CASE MANAGEMENT/SOCIAL WORK - PATIENT PORTAL LINK FT
You can access the FollowMyHealth Patient Portal offered by [facility] by registering at the following website: [email or web address]. By joining [employer]’s FollowMyHealth portal, you will also be able to view your health information using other applications (apps) compatible with our system.

## 2021-12-14 NOTE — PROGRESS NOTE ADULT - ASSESSMENT
75F with chronic resp failure - vent dependent, hx of subarachnoid hemorrhage, hx of cardiac arrest, dementia s/p PEG, HTN, DM2 on insulin, hx of CVA, GERD, COPD, admission here from 9/25 to 10/4 and 11/7-11/11 for respiratory failure/sepsis possibly 2/2 aspiration vs vent associated PNA who presents from [facility] w leukocytosis of 12.18, and concerns for repeat aspiration PNA    CT-Distal airways impaction and multifocal pneumonia, possibly related to aspiration. Most of the opacities are either new or unchanged since prior study.     RECOMMENDATIONS  respiratory culture w/ pseudomonas, Stenotrophomonas and Serratia marcescens  s/p 5 day course of zosyn  has clinically improved despite zosyn only covering pseudomonas and serratia but not stenotrophomonas therefore likely colonization and not infection & therefore will not adjust therapy to cover this (also would involve large doses of bactrim & pt w/ CKD)  s/p levofloxacin 750mg IV x 1 on 12/13  Given CrCl 31, will cover x 48 hours completing 7 day course of antibiotics today  ok for discharge from ID perspective    Emil Medellin M.D.  [phone]  [facility], Division of Infectious Diseases  [phone]  After 5pm on weekdays and all day on weekends - please call [phone]    75F with chronic resp failure - vent dependent, hx of subarachnoid hemorrhage, hx of cardiac arrest, dementia s/p PEG, HTN, DM2 on insulin, hx of CVA, GERD, COPD, admission here from 9/25 to 10/4 and 11/7-11/11 for respiratory failure/sepsis possibly 2/2 aspiration vs vent associated PNA who presents from [facility] w leukocytosis of 12.18, and concerns for repeat aspiration PNA    CT-Distal airways impaction and multifocal pneumonia, possibly related to aspiration. Most of the opacities are either new or unchanged since prior study.     RECOMMENDATIONS  respiratory culture w/ pseudomonas, Stenotrophomonas and Serratia marcescens  s/p 6 day course of zosyn  has clinically improved despite zosyn only covering pseudomonas and serratia but not stenotrophomonas therefore likely colonization and not infection & therefore will not adjust therapy to cover this (also would involve large doses of bactrim & pt w/ CKD)  s/p levofloxacin 750mg IV x 1 on 12/13  Given CrCl 31, will cover x 48 hours completing 7 day course of antibiotics today  ok for discharge from ID perspective    Emil Medellin M.D.  [phone]  [facility], Division of Infectious Diseases  [phone]  After 5pm on weekdays and all day on weekends - please call [phone]

## 2021-12-14 NOTE — PROGRESS NOTE ADULT - ASSESSMENT
s/p replacement of Peg yesterday at bedside   followup x ray noted- tube feeding tolerated   can resume later today, use peg for medication delivery prn   acid suppression

## 2021-12-14 NOTE — PROVIDER CONTACT NOTE (EICU) - RECOMMENDATIONS
Recommended propofol drip but bedside team states patient has only on PIV, making peripheral vasopressors hard to administer with propofol.  Team will start versed drip and monitor BP jose roberto-transport.

## 2021-12-14 NOTE — PROGRESS NOTE ADULT - SUBJECTIVE AND OBJECTIVE BOX
Neurology follow up note    BREA [id number]      Interval History:    Patient feels ok no new complaints.    Allergies    codeine (Hives)    Intolerances        MEDICATIONS    acetaminophen     Tablet .. 650 milliGRAM(s) Oral every 6 hours PRN  ALBUTerol    90 MICROgram(s) HFA Inhaler 2 Puff(s) Inhalation every 6 hours  chlorhexidine 0.12% Liquid 15 milliLiter(s) Oral Mucosa every 12 hours  dextrose 40% Gel 15 Gram(s) Oral once  dextrose 5%. 1000 milliLiter(s) IV Continuous <Continuous>  dextrose 5%. 1000 milliLiter(s) IV Continuous <Continuous>  dextrose 50% Injectable 25 Gram(s) IV Push once  dextrose 50% Injectable 25 Gram(s) IV Push once  dextrose 50% Injectable 12.5 Gram(s) IV Push once  fentaNYL    Injectable 100 MICROGram(s) IV Push once  glucagon  Injectable 1 milliGRAM(s) IntraMuscular once  heparin   Injectable 5000 Unit(s) SubCutaneous every 12 hours  insulin lispro (ADMELOG) corrective regimen sliding scale   SubCutaneous every 6 hours  LORazepam   Injectable 1 milliGRAM(s) IV Push every 4 hours PRN  methocarbamol 500 milliGRAM(s) Oral two times a day  midazolam Injectable 4 milliGRAM(s) IV Push once  pantoprazole  Injectable 40 milliGRAM(s) IV Push every 12 hours  polyethylene glycol 3350 17 Gram(s) Oral every 12 hours  senna 2 Tablet(s) Oral at bedtime  simethicone 80 milliGRAM(s) Chew every 6 hours  sucralfate suspension 1 Gram(s) Enteral Tube every 6 hours              Vital Signs Last 24 Hrs  T(C): 36.7 (14 Dec 2021 12:34), Max: 36.9 (14 Dec 2021 03:36)  T(F): 98.1 (14 Dec 2021 12:34), Max: 98.4 (14 Dec 2021 03:36)  HR: 62 (14 Dec 2021 10:26) (53 - 77)  BP: 124/57 (14 Dec 2021 10:00) (102/54 - 143/85)  BP(mean): 78 (14 Dec 2021 10:00) (69 - 102)  RR: 14 (14 Dec 2021 10:00) (0 - 18)  SpO2: 100% (14 Dec 2021 10:26) (98% - 100%)        REVIEW OF SYSTEMS:  Could not be obtained secondary to the patient being nonverbal.    PHYSICAL EXAMINATION:    HEENT:  Head:  Normocephalic.  Eyes:  No scleral icterus.  Ears:  Hard to evaluate secondary to the patient being nonverbal.  NECK:  Had increased tone, tracheostomy was in place.  RESPIRATORY:  Decreased breath sounds bilaterally.  CARDIOVASCULAR:  S1 and S2 heard.  ABDOMEN:  Soft and nontender.  EXTREMITIES:  No clubbing or cyanosis was noted.      NEUROLOGIC:  The patient was resting in bed with eyes open.  Upon stimulation of the patient, her eyes would roll up, with subtle shaking.  Speech:  Nonverbal.  Tone in all four extremities increased.  Bilateral upper extremities were in a flexed position.  Bilateral lower extremities were in the straight position.             LABS:  CBC Full  -  ( 13 Dec 2021 06:20 )  WBC Count : 5.60 K/uL  RBC Count : 3.56 M/uL  Hemoglobin : 8.9 g/dL  Hematocrit : 30.5 %  Platelet Count - Automated : 236 K/uL  Mean Cell Volume : 85.7 fl  Mean Cell Hemoglobin : 25.0 pg  Mean Cell Hemoglobin Concentration : 29.2 gm/dL  Auto Neutrophil # : x  Auto Lymphocyte # : x  Auto Monocyte # : x  Auto Eosinophil # : x  Auto Basophil # : x  Auto Neutrophil % : x  Auto Lymphocyte % : x  Auto Monocyte % : x  Auto Eosinophil % : x  Auto Basophil % : x      12-13    142  |  108  |  27<H>  ----------------------------<  145<H>  4.4   |  25  |  1.03    Ca    8.3<L>      13 Dec 2021 06:20  Phos  1.8     12-13  Mg     2.1     12-13      Hemoglobin A1C:       Vitamin B12         RADIOLOGY    ANALYSIS AND PLAN:  This is a 76-year-old with episodes of altered mental status and a history of shaking.  For altered mental status, metabolic encephalopathy secondary to underlying infectious type process and possibly underlying pneumonia.  For episodes of abnormal movements, as per my conversation with the spouse in the past, these were nonepileptic in nature.  As per my conversation with him in the past, does not want any antiseizure medications.  will increase muscle relaxants to see if that will help with tone, with no significant changes.  For history of diabetes, strict control of blood sugars.  monition  respiratory status as needed   events noted spoke to spouse about EEG monitoring he is now agreeable for monitoring     The spouse's name is Marciano, his telephone number is [phone] 12/14    Greater than 30 minutes of time was spent with the patient, plan of care, reviewing data, and speaking to the multidisciplinary healthcare team with greater than 50% of that time in counseling and care coordination.    Thank you for the courtesy of this consultation.

## 2021-12-14 NOTE — PROGRESS NOTE ADULT - SUBJECTIVE AND OBJECTIVE BOX
Date/Time Patient Seen:  		  Referring MD:   Data Reviewed	       Patient is a 76y old  Female who presents with a chief complaint of Anemia (13 Dec 2021 13:16)      Subjective/HPI     PAST MEDICAL & SURGICAL HISTORY:  Dementia of frontal lobe type    Aphasic stroke    Diabetes mellitus    Respiratory failure    Hypertension    GERD (gastroesophageal reflux disease)    Constipation    Respiratory failure    CVA (cerebral vascular accident)    HTN (hypertension)    DM (diabetes mellitus)    Advanced dementia    COVID-19 virus detected    Quadriplegia    Pneumonia    Type II diabetes mellitus    Hx of appendectomy    Gastrostomy in place    Tracheostomy in place    Tracheostomy tube present    Feeding by G-tube          Medication list         MEDICATIONS  (STANDING):  ALBUTerol    90 MICROgram(s) HFA Inhaler 2 Puff(s) Inhalation every 6 hours  chlorhexidine 0.12% Liquid 15 milliLiter(s) Oral Mucosa every 12 hours  dextrose 40% Gel 15 Gram(s) Oral once  dextrose 5%. 1000 milliLiter(s) (50 mL/Hr) IV Continuous <Continuous>  dextrose 5%. 1000 milliLiter(s) (100 mL/Hr) IV Continuous <Continuous>  dextrose 50% Injectable 25 Gram(s) IV Push once  dextrose 50% Injectable 25 Gram(s) IV Push once  dextrose 50% Injectable 12.5 Gram(s) IV Push once  glucagon  Injectable 1 milliGRAM(s) IntraMuscular once  heparin   Injectable 5000 Unit(s) SubCutaneous every 12 hours  insulin lispro (ADMELOG) corrective regimen sliding scale   SubCutaneous every 6 hours  methocarbamol 250 milliGRAM(s) Oral two times a day  pantoprazole  Injectable 40 milliGRAM(s) IV Push every 12 hours  polyethylene glycol 3350 17 Gram(s) Oral every 12 hours  senna 2 Tablet(s) Oral at bedtime  simethicone 80 milliGRAM(s) Chew every 6 hours  sucralfate suspension 1 Gram(s) Enteral Tube every 6 hours    MEDICATIONS  (PRN):  acetaminophen     Tablet .. 650 milliGRAM(s) Oral every 6 hours PRN Temp greater or equal to 38C (100.4F), Mild Pain (1 - 3)  LORazepam   Injectable 1 milliGRAM(s) IV Push every 4 hours PRN Agitation         Vitals log        ICU Vital Signs Last 24 Hrs  T(C): 36.9 (14 Dec 2021 03:36), Max: 36.9 (14 Dec 2021 03:36)  T(F): 98.4 (14 Dec 2021 03:36), Max: 98.4 (14 Dec 2021 03:36)  HR: 59 (14 Dec 2021 06:42) (53 - 89)  BP: 119/60 (14 Dec 2021 06:00) (101/57 - 143/85)  BP(mean): 79 (14 Dec 2021 06:00) (69 - 102)  ABP: --  ABP(mean): --  RR: 18 (14 Dec 2021 06:00) (0 - 18)  SpO2: 100% (14 Dec 2021 06:42) (98% - 100%)       Mode: AC/ CMV (Assist Control/ Continuous Mandatory Ventilation)  RR (machine): 18  TV (machine): 400  FiO2: 40  PEEP: 5  ITime: 1  MAP: 10  PIP: 32      Input and Output:  I&O's Detail    13 Dec 2021 07:01  -  14 Dec 2021 07:00  --------------------------------------------------------  IN:    Enteral Tube Flush: 375 mL    Glucerna 1.5: 675 mL    IV PiggyBack: 300 mL    sodium chloride 0.9%: 700 mL  Total IN: 2050 mL    OUT:    Voided (mL): 850 mL  Total OUT: 850 mL    Total NET: 1200 mL          Lab Data                        8.9    5.60  )-----------( 236      ( 13 Dec 2021 06:20 )             30.5     12-13    142  |  108  |  27<H>  ----------------------------<  145<H>  4.4   |  25  |  1.03    Ca    8.3<L>      13 Dec 2021 06:20  Phos  1.8     12-13  Mg     2.1     12-13              Review of Systems	      Objective     Physical Examination    heart s1s2  lung dec bS  abd soft      Pertinent Lab findings & Imaging      Annalise:  NO   Adequate UO     I&O's Detail    13 Dec 2021 07:01  -  14 Dec 2021 07:00  --------------------------------------------------------  IN:    Enteral Tube Flush: 375 mL    Glucerna 1.5: 675 mL    IV PiggyBack: 300 mL    sodium chloride 0.9%: 700 mL  Total IN: 2050 mL    OUT:    Voided (mL): 850 mL  Total OUT: 850 mL    Total NET: 1200 mL               Discussed with:     Cultures:	        Radiology

## 2021-12-14 NOTE — PROVIDER CONTACT NOTE (EICU) - DATE AND TIME:
14-Dec-2021 16:58
14-Dec-2021 17:15
14-Dec-2021 12:16
14-Dec-2021 18:33
11-Dec-2021 16:50
14-Dec-2021 13:29

## 2021-12-14 NOTE — PROGRESS NOTE ADULT - SUBJECTIVE AND OBJECTIVE BOX
Chief Complaint:        INTERVAL HPI/OVERNIGHT EVENTS:  pt w/ rapid response   no bleeding  ?seizure      MEDICATIONS  (STANDING):  ALBUTerol    90 MICROgram(s) HFA Inhaler 2 Puff(s) Inhalation every 6 hours  chlorhexidine 0.12% Liquid 15 milliLiter(s) Oral Mucosa every 12 hours  dextrose 40% Gel 15 Gram(s) Oral once  dextrose 5%. 1000 milliLiter(s) (50 mL/Hr) IV Continuous <Continuous>  dextrose 5%. 1000 milliLiter(s) (100 mL/Hr) IV Continuous <Continuous>  dextrose 50% Injectable 25 Gram(s) IV Push once  dextrose 50% Injectable 25 Gram(s) IV Push once  dextrose 50% Injectable 12.5 Gram(s) IV Push once  fentaNYL    Injectable 100 MICROGram(s) IV Push once  glucagon  Injectable 1 milliGRAM(s) IntraMuscular once  heparin   Injectable 5000 Unit(s) SubCutaneous every 12 hours  insulin lispro (ADMELOG) corrective regimen sliding scale   SubCutaneous every 6 hours  methocarbamol 500 milliGRAM(s) Oral two times a day  midazolam Injectable 4 milliGRAM(s) IV Push once  pantoprazole  Injectable 40 milliGRAM(s) IV Push every 12 hours  polyethylene glycol 3350 17 Gram(s) Oral every 12 hours  senna 2 Tablet(s) Oral at bedtime  simethicone 80 milliGRAM(s) Chew every 6 hours  sucralfate suspension 1 Gram(s) Enteral Tube every 6 hours    MEDICATIONS  (PRN):  acetaminophen     Tablet .. 650 milliGRAM(s) Oral every 6 hours PRN Temp greater or equal to 38C (100.4F), Mild Pain (1 - 3)  LORazepam   Injectable 1 milliGRAM(s) IV Push every 4 hours PRN Agitation            Vital Signs Last 24 Hrs  T(C): 36.7 (14 Dec 2021 12:34), Max: 36.9 (14 Dec 2021 03:36)  T(F): 98.1 (14 Dec 2021 12:34), Max: 98.4 (14 Dec 2021 03:36)  HR: 62 (14 Dec 2021 10:26) (53 - 77)  BP: 124/57 (14 Dec 2021 10:00) (102/54 - 143/85)  BP(mean): 78 (14 Dec 2021 10:00) (69 - 102)  RR: 14 (14 Dec 2021 10:00) (0 - 18)  SpO2: 100% (14 Dec 2021 10:26) (98% - 100%)    Physical exam:    rapid response underway        LABS:                        8.9    5.60  )-----------( 236      ( 13 Dec 2021 06:20 )             30.5     12-13    142  |  108  |  27<H>  ----------------------------<  145<H>  4.4   |  25  |  1.03    Ca    8.3<L>      13 Dec 2021 06:20  Phos  1.8     12-13  Mg     2.1     12-13            RADIOLOGY & ADDITIONAL TESTS:

## 2021-12-14 NOTE — PROVIDER CONTACT NOTE (EICU) - REASON
Agitation and hypoxia
Agitation, Vent Dyssynchrony
elert for agitation on vent
Tachypnea
Orders
elert

## 2021-12-14 NOTE — PROGRESS NOTE ADULT - SUBJECTIVE AND OBJECTIVE BOX
Patient is a 76y Female whom presented to the hospital with ckd and manasa     PAST MEDICAL & SURGICAL HISTORY:  Dementia of frontal lobe type    Aphasic stroke    Diabetes mellitus    Respiratory failure    Hypertension    GERD (gastroesophageal reflux disease)    Constipation    Respiratory failure    CVA (cerebral vascular accident)    HTN (hypertension)    DM (diabetes mellitus)    Advanced dementia    COVID-19 virus detected    Quadriplegia    Pneumonia    Type II diabetes mellitus    Hx of appendectomy    Gastrostomy in place    Tracheostomy in place    Tracheostomy tube present    Feeding by G-tube        MEDICATIONS  (STANDING):  ALBUTerol    90 MICROgram(s) HFA Inhaler 2 Puff(s) Inhalation every 6 hours  chlorhexidine 0.12% Liquid 15 milliLiter(s) Oral Mucosa every 12 hours  dextrose 40% Gel 15 Gram(s) Oral once  dextrose 5%. 1000 milliLiter(s) (50 mL/Hr) IV Continuous <Continuous>  dextrose 5%. 1000 milliLiter(s) (100 mL/Hr) IV Continuous <Continuous>  dextrose 50% Injectable 25 Gram(s) IV Push once  dextrose 50% Injectable 12.5 Gram(s) IV Push once  dextrose 50% Injectable 25 Gram(s) IV Push once  glucagon  Injectable 1 milliGRAM(s) IntraMuscular once  insulin lispro (ADMELOG) corrective regimen sliding scale   SubCutaneous every 6 hours  lactated ringers. 1000 milliLiter(s) (100 mL/Hr) IV Continuous <Continuous>  methocarbamol 250 milliGRAM(s) Oral two times a day  pantoprazole  Injectable 40 milliGRAM(s) IV Push every 12 hours  piperacillin/tazobactam IVPB.. 3.375 Gram(s) IV Intermittent every 8 hours  polyethylene glycol 3350 17 Gram(s) Oral every 12 hours  senna 2 Tablet(s) Oral at bedtime  simethicone 80 milliGRAM(s) Chew every 6 hours  sucralfate 1 Gram(s) Oral every 6 hours  tiotropium 18 MICROgram(s) Capsule 1 Capsule(s) Inhalation daily  vancomycin  IVPB 750 milliGRAM(s) IV Intermittent every 12 hours      Allergies    codeine (Hives)    Intolerances        SOCIAL HISTORY:  Denies ETOh,Smoking,     FAMILY HISTORY:  No pertinent family history in first degree relatives        REVIEW OF SYSTEMS:    unable to obtained a good review system                                                                                                                               9.6    17.35 )-----------( 332      ( 14 Dec 2021 13:58 )             32.9       CBC Full  -  ( 14 Dec 2021 13:58 )  WBC Count : 17.35 K/uL  RBC Count : 3.77 M/uL  Hemoglobin : 9.6 g/dL  Hematocrit : 32.9 %  Platelet Count - Automated : 332 K/uL  Mean Cell Volume : 87.3 fl  Mean Cell Hemoglobin : 25.5 pg  Mean Cell Hemoglobin Concentration : 29.2 gm/dL  Auto Neutrophil # : 16.02 K/uL  Auto Lymphocyte # : 0.37 K/uL  Auto Monocyte # : 0.82 K/uL  Auto Eosinophil # : 0.01 K/uL  Auto Basophil # : 0.04 K/uL  Auto Neutrophil % : 92.4 %  Auto Lymphocyte % : 2.1 %  Auto Monocyte % : 4.7 %  Auto Eosinophil % : 0.1 %  Auto Basophil % : 0.2 %      12-14    142  |  109<H>  |  25<H>  ----------------------------<  201<H>  4.9   |  24  |  1.20    Ca    8.9      14 Dec 2021 15:28  Phos  3.3     12-14  Mg     2.0     12-14    TPro  6.7  /  Alb  2.1<L>  /  TBili  0.3  /  DBili  x   /  AST  20  /  ALT  21  /  AlkPhos  132<H>  12-14      CAPILLARY BLOOD GLUCOSE      POCT Blood Glucose.: 109 mg/dL (14 Dec 2021 19:21)  POCT Blood Glucose.: 199 mg/dL (14 Dec 2021 12:03)  POCT Blood Glucose.: 129 mg/dL (14 Dec 2021 05:54)  POCT Blood Glucose.: 147 mg/dL (13 Dec 2021 23:22)      Vital Signs Last 24 Hrs  T(C): 36.4 (14 Dec 2021 20:03), Max: 36.9 (14 Dec 2021 03:36)  T(F): 97.6 (14 Dec 2021 20:03), Max: 98.4 (14 Dec 2021 03:36)  HR: 59 (14 Dec 2021 20:00) (53 - 135)  BP: 112/53 (14 Dec 2021 20:00) (90/53 - 147/100)  BP(mean): 71 (14 Dec 2021 20:00) (69 - 111)  RR: 21 (14 Dec 2021 20:00) (12 - 45)  SpO2: 100% (14 Dec 2021 20:00) (79% - 100%)                 PHYSICAL EXAM:    Constitutional: NAD  HEENT: conjunctive   clear   Neck:  No JVD  Respiratory: pos decrease bs pos trach  Cardiovascular: S1 and S2  Gastrointestinal: BS+, soft, pos peg   Extremities: No peripheral edema

## 2021-12-14 NOTE — PROGRESS NOTE ADULT - SUBJECTIVE AND OBJECTIVE BOX
Patient is a 76y old  Female who presents with a chief complaint of Anemia (14 Dec 2021 13:26)      INTERVAL HPI/OVERNIGHT EVENTS: Patient seen and examined at bedside. No overnight events. Patient had episode of seizure like activity lasting for 1.5 hours. Symptoms resolved with Fentanyl and Versed push. Patient was arranged for transport to [facility] for cont EEG. As patient was being transferred from hospital vent to ambulance vent, patient required       MEDICATIONS  (STANDING):  ALBUTerol    90 MICROgram(s) HFA Inhaler 2 Puff(s) Inhalation every 6 hours  chlorhexidine 0.12% Liquid 15 milliLiter(s) Oral Mucosa every 12 hours  dextrose 40% Gel 15 Gram(s) Oral once  dextrose 5%. 1000 milliLiter(s) (50 mL/Hr) IV Continuous <Continuous>  dextrose 5%. 1000 milliLiter(s) (100 mL/Hr) IV Continuous <Continuous>  dextrose 50% Injectable 25 Gram(s) IV Push once  dextrose 50% Injectable 25 Gram(s) IV Push once  dextrose 50% Injectable 12.5 Gram(s) IV Push once  furosemide   Injectable 40 milliGRAM(s) IV Push once  glucagon  Injectable 1 milliGRAM(s) IntraMuscular once  heparin   Injectable 5000 Unit(s) SubCutaneous every 12 hours  insulin lispro (ADMELOG) corrective regimen sliding scale   SubCutaneous every 6 hours  methocarbamol 500 milliGRAM(s) Oral two times a day  pantoprazole  Injectable 40 milliGRAM(s) IV Push every 12 hours  piperacillin/tazobactam IVPB. 3.375 Gram(s) IV Intermittent once  piperacillin/tazobactam IVPB.. 3.375 Gram(s) IV Intermittent every 8 hours  polyethylene glycol 3350 17 Gram(s) Oral every 12 hours  propofol Infusion 5 MICROgram(s)/kG/Min (1.33 mL/Hr) IV Continuous <Continuous>  senna 2 Tablet(s) Oral at bedtime  simethicone 80 milliGRAM(s) Chew every 6 hours  sucralfate suspension 1 Gram(s) Enteral Tube every 6 hours    MEDICATIONS  (PRN):  acetaminophen     Tablet .. 650 milliGRAM(s) Oral every 6 hours PRN Temp greater or equal to 38C (100.4F), Mild Pain (1 - 3)  LORazepam   Injectable 1 milliGRAM(s) IV Push every 4 hours PRN Agitation      Allergies    codeine (Hives)    Intolerances        REVIEW OF SYSTEMS:  CONSTITUTIONAL: No fever or chills  HEENT:  No headache, no sore throat  RESPIRATORY: No cough, wheezing, or shortness of breath  CARDIOVASCULAR: No chest pain, palpitations, or leg swelling  GASTROINTESTINAL: No abd pain, nausea, vomiting, or diarrhea  GENITOURINARY: No dysuria, frequency, or hematuria  NEUROLOGICAL: no focal weakness or dizziness  MUSCULOSKELETAL: no myalgias     Vital Signs Last 24 Hrs  T(C): 36.6 (14 Dec 2021 16:24), Max: 36.9 (14 Dec 2021 03:36)  T(F): 97.9 (14 Dec 2021 16:24), Max: 98.4 (14 Dec 2021 03:36)  HR: 69 (14 Dec 2021 15:35) (53 - 135)  BP: 100/49 (14 Dec 2021 15:35) (90/53 - 147/100)  BP(mean): 75 (14 Dec 2021 14:00) (69 - 111)  RR: 20 (14 Dec 2021 15:35) (0 - 45)  SpO2: 99% (14 Dec 2021 15:35) (79% - 100%)    PHYSICAL EXAM:  GENERAL: NAD  HEENT:  EOMI, moist mucous membranes  CHEST/LUNG:  CTA b/l, no rales, wheezes, or rhonchi  HEART:  RRR, S1, S2  ABDOMEN:  BS+, soft, nontender, nondistended  EXTREMITIES: no edema, cyanosis, or calf tenderness  NERVOUS SYSTEM: AA&Ox3    LABS:                        9.6    17.35 )-----------( 332      ( 14 Dec 2021 13:58 )             32.9     CBC Full  -  ( 14 Dec 2021 13:58 )  WBC Count : 17.35 K/uL  Hemoglobin : 9.6 g/dL  Hematocrit : 32.9 %  Platelet Count - Automated : 332 K/uL  Mean Cell Volume : 87.3 fl  Mean Cell Hemoglobin : 25.5 pg  Mean Cell Hemoglobin Concentration : 29.2 gm/dL  Auto Neutrophil # : 16.02 K/uL  Auto Lymphocyte # : 0.37 K/uL  Auto Monocyte # : 0.82 K/uL  Auto Eosinophil # : 0.01 K/uL  Auto Basophil # : 0.04 K/uL  Auto Neutrophil % : 92.4 %  Auto Lymphocyte % : 2.1 %  Auto Monocyte % : 4.7 %  Auto Eosinophil % : 0.1 %  Auto Basophil % : 0.2 %    14 Dec 2021 15:28    142    |  109    |  25     ----------------------------<  201    4.9     |  24     |  1.20     Ca    8.9        14 Dec 2021 15:28  Phos  3.3       14 Dec 2021 15:28  Mg     2.0       14 Dec 2021 15:28    TPro  6.7    /  Alb  2.1    /  TBili  0.3    /  DBili  x      /  AST  20     /  ALT  21     /  AlkPhos  132    14 Dec 2021 15:28        CAPILLARY BLOOD GLUCOSE      POCT Blood Glucose.: 199 mg/dL (14 Dec 2021 12:03)  POCT Blood Glucose.: 129 mg/dL (14 Dec 2021 05:54)  POCT Blood Glucose.: 147 mg/dL (13 Dec 2021 23:22)        Culture - Sputum (collected 12-11-21 @ 00:41)  Source: .Sputum Sputum  Gram Stain (12-11-21 @ 04:56):    Moderate polymorphonuclear leukocytes per low power field    Rare Squamous epithelial cells per low power field    Numerous Gram Negative Rods seen per oil power field  Final Report (12-12-21 @ 17:47):    Few Pseudomonas aeruginosa (Carbapenem Resistant)    Moderate Serratia marcescens    Moderate Stenotrophomonas maltophilia    Normal Respiratory Elvira present  Organism: Stenotrophomonas maltophilia (12-12-21 @ 18:03)      -  Ceftazidime: R >16      -  Levofloxacin: S <=0.5      -  Trimethoprim/Sulfamethoxazole: S <=0.5/9.5      Method Type: PRATIK  Organism: Pseudomonas aeruginosa (Carbapenem Resistant) (12-12-21 @ 18:03)      -  Amikacin: S <=16      -  Aztreonam: S <=4      -  Cefepime: S 4      -  Ceftazidime: S 4      -  Ceftazidime/Avibactam: S      -  Ceftolozane/tazobactam: S      -  Ciprofloxacin: S <=0.25      -  Gentamicin: S <=2      -  Imipenem: R >8      -  Levofloxacin: S <=0.5      -  Meropenem: I 4      -  Piperacillin/Tazobactam: S <=8      -  Tobramycin: S <=2      Method Type: PRATIK  Organism: Serratia marcescens (12-12-21 @ 18:03)      -  Amikacin: S <=16      -  Amoxicillin/Clavulanic Acid: R >16/8      -  Ampicillin: R >16 These ampicillin results predict results for amoxicillin      -  Ampicillin/Sulbactam: R >16/8 Enterobacter, Klebsiella aerogenes, Citrobacter, and Serratia may develop resistance during prolonged therapy (3-4 days)      -  Aztreonam: S <=4      -  Cefazolin: R >16 Enterobacter, Klebsiella aerogenes, Citrobacter, and Serratia may develop resistance during prolonged therapy (3-4 days)      -  Cefepime: S <=2      -  Cefoxitin: R <=8      -  Ceftriaxone: I 2 Enterobacter, Klebsiella aerogenes, Citrobacter, and Serratia may develop resistance during prolonged therapy      -  Ciprofloxacin: R >2      -  Ertapenem: S <=0.5      -  Gentamicin: S <=2      -  Levofloxacin: R 2      -  Meropenem: S <=1      -  Piperacillin/Tazobactam: S <=8      -  Tobramycin: S <=2      -  Trimethoprim/Sulfamethoxazole: S <=0.5/9.5      Method Type: PRATIK  Organism: Serratia marcescens  Pseudomonas aeruginosa (Carbapenem Resistant)  Stenotrophomonas maltophilia (12-12-21 @ 17:47)    Culture - Urine (collected 12-08-21 @ 17:55)  Source: Clean Catch Clean Catch (Midstream)  Final Report (12-09-21 @ 13:58):    <10,000 CFU/mL Normal Urogenital Elvira    Culture - Blood (collected 12-08-21 @ 15:24)  Source: .Blood Blood-Peripheral  Final Report (12-13-21 @ 16:01):    No Growth Final    Culture - Blood (collected 12-08-21 @ 15:24)  Source: .Blood Blood-Peripheral  Final Report (12-13-21 @ 16:01):    No Growth Final        RADIOLOGY & ADDITIONAL TESTS:    Consultant(s) Notes Reviewed:  [x] YES  [ ] NO    Care Discussed with [x] Consultants  [x] Patient  [ ] Family  [ ]      [ x] Other; RN  DVT ppx   Patient is a 76y old  Female who presents with a chief complaint of Anemia (14 Dec 2021 13:26)      INTERVAL HPI/OVERNIGHT EVENTS: Patient seen and examined at bedside. No overnight events. Patient had episode of seizure like activity lasting for 1.5 hours. Symptoms resolved with Fentanyl and Versed push. Patient was arranged for transport to [facility] for cont EEG. As patient was being transferred from hospital vent to ambulance vent, patient required full vent support, started on Propofol and BP dropped to 80's.       MEDICATIONS  (STANDING):  ALBUTerol    90 MICROgram(s) HFA Inhaler 2 Puff(s) Inhalation every 6 hours  chlorhexidine 0.12% Liquid 15 milliLiter(s) Oral Mucosa every 12 hours  dextrose 40% Gel 15 Gram(s) Oral once  dextrose 5%. 1000 milliLiter(s) (50 mL/Hr) IV Continuous <Continuous>  dextrose 5%. 1000 milliLiter(s) (100 mL/Hr) IV Continuous <Continuous>  dextrose 50% Injectable 25 Gram(s) IV Push once  dextrose 50% Injectable 25 Gram(s) IV Push once  dextrose 50% Injectable 12.5 Gram(s) IV Push once  furosemide   Injectable 40 milliGRAM(s) IV Push once  glucagon  Injectable 1 milliGRAM(s) IntraMuscular once  heparin   Injectable 5000 Unit(s) SubCutaneous every 12 hours  insulin lispro (ADMELOG) corrective regimen sliding scale   SubCutaneous every 6 hours  methocarbamol 500 milliGRAM(s) Oral two times a day  pantoprazole  Injectable 40 milliGRAM(s) IV Push every 12 hours  piperacillin/tazobactam IVPB. 3.375 Gram(s) IV Intermittent once  piperacillin/tazobactam IVPB.. 3.375 Gram(s) IV Intermittent every 8 hours  polyethylene glycol 3350 17 Gram(s) Oral every 12 hours  propofol Infusion 5 MICROgram(s)/kG/Min (1.33 mL/Hr) IV Continuous <Continuous>  senna 2 Tablet(s) Oral at bedtime  simethicone 80 milliGRAM(s) Chew every 6 hours  sucralfate suspension 1 Gram(s) Enteral Tube every 6 hours    MEDICATIONS  (PRN):  acetaminophen     Tablet .. 650 milliGRAM(s) Oral every 6 hours PRN Temp greater or equal to 38C (100.4F), Mild Pain (1 - 3)  LORazepam   Injectable 1 milliGRAM(s) IV Push every 4 hours PRN Agitation      Allergies    codeine (Hives)    Intolerances        REVIEW OF SYSTEMS:  Unable to obtain meaningful ROS due to mental status      Vital Signs Last 24 Hrs  T(C): 36.6 (14 Dec 2021 16:24), Max: 36.9 (14 Dec 2021 03:36)  T(F): 97.9 (14 Dec 2021 16:24), Max: 98.4 (14 Dec 2021 03:36)  HR: 69 (14 Dec 2021 15:35) (53 - 135)  BP: 100/49 (14 Dec 2021 15:35) (90/53 - 147/100)  BP(mean): 75 (14 Dec 2021 14:00) (69 - 111)  RR: 20 (14 Dec 2021 15:35) (0 - 45)  SpO2: 99% (14 Dec 2021 15:35) (79% - 100%)      PHYSICAL EXAM:  GENERAL: chronically ill appearing, emaciated, NAD, obtunded  HEAD:  atraumatic  EYES: conjunctiva clear  NECK: +trach in place, clean  RESPIRATORY:  coarse mechanical breath sounds, no gross crackles or rales (+)trach, vent  CARDIOVASCULAR:  tachycardic, regular rate and rhythm, no murmurs or rubs or gallops, 2+ peripheral pulses  GASTROINTESTINAL:  soft, clean and dry as well, mildly distended, PEG site c/d/i  EXTREMITIES: no clubbing or cyanosis or edema  MUSCULOSKELETAL:  +diffuse contractures in all joints  NERVOUS SYSTEM: does not respond to pain  SKIN: necrotic tips of bilateral 1st toes    LABS:                        9.6    17.35 )-----------( 332      ( 14 Dec 2021 13:58 )             32.9     CBC Full  -  ( 14 Dec 2021 13:58 )  WBC Count : 17.35 K/uL  Hemoglobin : 9.6 g/dL  Hematocrit : 32.9 %  Platelet Count - Automated : 332 K/uL  Mean Cell Volume : 87.3 fl  Mean Cell Hemoglobin : 25.5 pg  Mean Cell Hemoglobin Concentration : 29.2 gm/dL  Auto Neutrophil # : 16.02 K/uL  Auto Lymphocyte # : 0.37 K/uL  Auto Monocyte # : 0.82 K/uL  Auto Eosinophil # : 0.01 K/uL  Auto Basophil # : 0.04 K/uL  Auto Neutrophil % : 92.4 %  Auto Lymphocyte % : 2.1 %  Auto Monocyte % : 4.7 %  Auto Eosinophil % : 0.1 %  Auto Basophil % : 0.2 %    14 Dec 2021 15:28    142    |  109    |  25     ----------------------------<  201    4.9     |  24     |  1.20     Ca    8.9        14 Dec 2021 15:28  Phos  3.3       14 Dec 2021 15:28  Mg     2.0       14 Dec 2021 15:28    TPro  6.7    /  Alb  2.1    /  TBili  0.3    /  DBili  x      /  AST  20     /  ALT  21     /  AlkPhos  132    14 Dec 2021 15:28        CAPILLARY BLOOD GLUCOSE      POCT Blood Glucose.: 199 mg/dL (14 Dec 2021 12:03)  POCT Blood Glucose.: 129 mg/dL (14 Dec 2021 05:54)  POCT Blood Glucose.: 147 mg/dL (13 Dec 2021 23:22)        Culture - Sputum (collected 12-11-21 @ 00:41)  Source: .Sputum Sputum  Gram Stain (12-11-21 @ 04:56):    Moderate polymorphonuclear leukocytes per low power field    Rare Squamous epithelial cells per low power field    Numerous Gram Negative Rods seen per oil power field  Final Report (12-12-21 @ 17:47):    Few Pseudomonas aeruginosa (Carbapenem Resistant)    Moderate Serratia marcescens    Moderate Stenotrophomonas maltophilia    Normal Respiratory Elvira present  Organism: Stenotrophomonas maltophilia (12-12-21 @ 18:03)      -  Ceftazidime: R >16      -  Levofloxacin: S <=0.5      -  Trimethoprim/Sulfamethoxazole: S <=0.5/9.5      Method Type: PRATIK  Organism: Pseudomonas aeruginosa (Carbapenem Resistant) (12-12-21 @ 18:03)      -  Amikacin: S <=16      -  Aztreonam: S <=4      -  Cefepime: S 4      -  Ceftazidime: S 4      -  Ceftazidime/Avibactam: S      -  Ceftolozane/tazobactam: S      -  Ciprofloxacin: S <=0.25      -  Gentamicin: S <=2      -  Imipenem: R >8      -  Levofloxacin: S <=0.5      -  Meropenem: I 4      -  Piperacillin/Tazobactam: S <=8      -  Tobramycin: S <=2      Method Type: PRATIK  Organism: Serratia marcescens (12-12-21 @ 18:03)      -  Amikacin: S <=16      -  Amoxicillin/Clavulanic Acid: R >16/8      -  Ampicillin: R >16 These ampicillin results predict results for amoxicillin      -  Ampicillin/Sulbactam: R >16/8 Enterobacter, Klebsiella aerogenes, Citrobacter, and Serratia may develop resistance during prolonged therapy (3-4 days)      -  Aztreonam: S <=4      -  Cefazolin: R >16 Enterobacter, Klebsiella aerogenes, Citrobacter, and Serratia may develop resistance during prolonged therapy (3-4 days)      -  Cefepime: S <=2      -  Cefoxitin: R <=8      -  Ceftriaxone: I 2 Enterobacter, Klebsiella aerogenes, Citrobacter, and Serratia may develop resistance during prolonged therapy      -  Ciprofloxacin: R >2      -  Ertapenem: S <=0.5      -  Gentamicin: S <=2      -  Levofloxacin: R 2      -  Meropenem: S <=1      -  Piperacillin/Tazobactam: S <=8      -  Tobramycin: S <=2      -  Trimethoprim/Sulfamethoxazole: S <=0.5/9.5      Method Type: PRATIK  Organism: Serratia marcescens  Pseudomonas aeruginosa (Carbapenem Resistant)  Stenotrophomonas maltophilia (12-12-21 @ 17:47)    Culture - Urine (collected 12-08-21 @ 17:55)  Source: Clean Catch Clean Catch (Midstream)  Final Report (12-09-21 @ 13:58):    <10,000 CFU/mL Normal Urogenital Elvira    Culture - Blood (collected 12-08-21 @ 15:24)  Source: .Blood Blood-Peripheral  Final Report (12-13-21 @ 16:01):    No Growth Final    Culture - Blood (collected 12-08-21 @ 15:24)  Source: .Blood Blood-Peripheral  Final Report (12-13-21 @ 16:01):    No Growth Final        RADIOLOGY & ADDITIONAL TESTS: < from: Xray Chest 1 View-PORTABLE IMMEDIATE (Xray Chest 1 View-PORTABLE IMMEDIATE .) (12.14.21 @ 13:46) >    ACC: [id number] EXAM:  XR CHEST PORTABLE IMMED 1V                          PROCEDURE DATE:  12/14/2021          INTERPRETATION:  AP chest on December 14, 2021 at 1:36 PM. Patient is   hypoxic.    Heart size difficult to assess. Tracheostomy again noted.    There are diffuse advanced bilateral infiltrates somewhat increased from   November 9.    IMPRESSION: Advanced infiltrates somewhat increased from prior.    --- End of Report ---            HANSEL ANDRADE MD; Attending Radiologist  This documenthas been electronically signed. Dec 14 2021  1:49PM    < end of copied text >      Consultant(s) Notes Reviewed:  [x] YES  [ ] NO    Care Discussed with [x] Consultants  [x] Patient  [ ] Family  [ ]      [ x] Other; RN  DVT ppx

## 2021-12-14 NOTE — PROGRESS NOTE ADULT - ASSESSMENT
The patient is a 76 year old female with a history of HTN, DM, CVA, dementia, chronic respiratory failure s/p trach, PEG, cardiac arrest who presents from NH with anemia.    Plan:  - Recent echo with normal LV systolic function, mild pulm HTN  - Hemoglobin improved with transfusion  - Remain off of aspirin  - Mechanical ventilation  - Overall poor prognosis. Comfort care would be most appropriate.

## 2021-12-14 NOTE — PROGRESS NOTE ADULT - SUBJECTIVE AND OBJECTIVE BOX
[facility], Division of Infectious Diseases  MOIZ Lora Y. Patel, S. Shah  [phone]  ([phone] - weekdays after 5pm and weekends)    Name: BREA BECKHAM  Age/Gender: 76y Female  MRN: [id number]    Interval History:  Patient seen this morning.   Notes reviewed.   Afebrile.   switch to levofloxacin x 1 yesterday    Allergies: codeine (Hives)      Objective:  Vitals:   T(F): 98.3 (12-14-21 @ 08:14), Max: 98.4 (12-14-21 @ 03:36)  HR: 67 (12-14-21 @ 08:00) (53 - 77)  BP: 129/62 (12-14-21 @ 08:00) (102/54 - 143/85)  RR: 17 (12-14-21 @ 08:00) (0 - 18)  SpO2: 100% (12-14-21 @ 08:00) (98% - 100%)  Physical Examination:  General: no acute distress  HEENT: NC/AT, anicteric, trachesotomy, on vent  Cardio: S1, S2 present, normal rate  Resp: clear to auscultation bilaterally anteriorly  Abd: soft, distended  Neuro: aphonia, does not respond to commands  Ext: b/l LE edema, contractures b/l  Skin: warm, dry, no visible rash  Lines:    Laboratory Studies:  CBC:                       8.9    5.60  )-----------( 236      ( 13 Dec 2021 06:20 )             30.5     WBC Trend:  5.60 12-13-21 @ 06:20  5.82 12-12-21 @ 06:29  6.98 12-11-21 @ 09:39  13.27 12-10-21 @ 08:38  9.43 12-09-21 @ 20:48  8.82 12-09-21 @ 07:09  6.92 12-08-21 @ 23:10  8.30 12-08-21 @ 20:11  12.18 12-08-21 @ 11:58    CMP: 12-13    142  |  108  |  27<H>  ----------------------------<  145<H>  4.4   |  25  |  1.03    Ca    8.3<L>      13 Dec 2021 06:20  Phos  1.8     12-13  Mg     2.1     12-13              Microbiology: reviewed     Culture - Sputum (collected 12-11-21 @ 00:41)  Source: .Sputum Sputum  Gram Stain (12-11-21 @ 04:56):    Moderate polymorphonuclear leukocytes per low power field    Rare Squamous epithelial cells per low power field    Numerous Gram Negative Rods seen per oil power field  Final Report (12-12-21 @ 17:47):    Few Pseudomonas aeruginosa (Carbapenem Resistant)    Moderate Serratia marcescens    Moderate Stenotrophomonas maltophilia    Normal Respiratory Elvira present  Organism: Stenotrophomonas maltophilia (12-12-21 @ 18:03)      -  Ceftazidime: R >16      -  Levofloxacin: S <=0.5      -  Trimethoprim/Sulfamethoxazole: S <=0.5/9.5      Method Type: PRATIK  Organism: Pseudomonas aeruginosa (Carbapenem Resistant) (12-12-21 @ 18:03)      -  Amikacin: S <=16      -  Aztreonam: S <=4      -  Cefepime: S 4      -  Ceftazidime: S 4      -  Ceftazidime/Avibactam: S      -  Ceftolozane/tazobactam: S      -  Ciprofloxacin: S <=0.25      -  Gentamicin: S <=2      -  Imipenem: R >8      -  Levofloxacin: S <=0.5      -  Meropenem: I 4      -  Piperacillin/Tazobactam: S <=8      -  Tobramycin: S <=2      Method Type: PRATIK  Organism: Serratia marcescens (12-12-21 @ 18:03)      -  Amikacin: S <=16      -  Amoxicillin/Clavulanic Acid: R >16/8      -  Ampicillin: R >16 These ampicillin results predict results for amoxicillin      -  Ampicillin/Sulbactam: R >16/8 Enterobacter, Klebsiella aerogenes, Citrobacter, and Serratia may develop resistance during prolonged therapy (3-4 days)      -  Aztreonam: S <=4      -  Cefazolin: R >16 Enterobacter, Klebsiella aerogenes, Citrobacter, and Serratia may develop resistance during prolonged therapy (3-4 days)      -  Cefepime: S <=2      -  Cefoxitin: R <=8      -  Ceftriaxone: I 2 Enterobacter, Klebsiella aerogenes, Citrobacter, and Serratia may develop resistance during prolonged therapy      -  Ciprofloxacin: R >2      -  Ertapenem: S <=0.5      -  Gentamicin: S <=2      -  Levofloxacin: R 2      -  Meropenem: S <=1      -  Piperacillin/Tazobactam: S <=8      -  Tobramycin: S <=2      -  Trimethoprim/Sulfamethoxazole: S <=0.5/9.5      Method Type: PRATIK  Organism: Serratia marcescens  Pseudomonas aeruginosa (Carbapenem Resistant)  Stenotrophomonas maltophilia (12-12-21 @ 17:47)    Culture - Urine (collected 12-08-21 @ 17:55)  Source: Clean Catch Clean Catch (Midstream)  Final Report (12-09-21 @ 13:58):    <10,000 CFU/mL Normal Urogenital Elvira    Culture - Blood (collected 12-08-21 @ 15:24)  Source: .Blood Blood-Peripheral  Final Report (12-13-21 @ 16:01):    No Growth Final    Culture - Blood (collected 12-08-21 @ 15:24)  Source: .Blood Blood-Peripheral  Final Report (12-13-21 @ 16:01):    No Growth Final      Radiology: reviewed     Medications:  acetaminophen     Tablet .. 650 milliGRAM(s) Oral every 6 hours PRN  ALBUTerol    90 MICROgram(s) HFA Inhaler 2 Puff(s) Inhalation every 6 hours  chlorhexidine 0.12% Liquid 15 milliLiter(s) Oral Mucosa every 12 hours  dextrose 40% Gel 15 Gram(s) Oral once  dextrose 5%. 1000 milliLiter(s) IV Continuous <Continuous>  dextrose 5%. 1000 milliLiter(s) IV Continuous <Continuous>  dextrose 50% Injectable 25 Gram(s) IV Push once  dextrose 50% Injectable 12.5 Gram(s) IV Push once  dextrose 50% Injectable 25 Gram(s) IV Push once  glucagon  Injectable 1 milliGRAM(s) IntraMuscular once  heparin   Injectable 5000 Unit(s) SubCutaneous every 12 hours  insulin lispro (ADMELOG) corrective regimen sliding scale   SubCutaneous every 6 hours  LORazepam   Injectable 1 milliGRAM(s) IV Push every 4 hours PRN  methocarbamol 250 milliGRAM(s) Oral two times a day  pantoprazole  Injectable 40 milliGRAM(s) IV Push every 12 hours  polyethylene glycol 3350 17 Gram(s) Oral every 12 hours  senna 2 Tablet(s) Oral at bedtime  simethicone 80 milliGRAM(s) Chew every 6 hours  sucralfate suspension 1 Gram(s) Enteral Tube every 6 hours    Antimicrobials:

## 2021-12-14 NOTE — PROVIDER CONTACT NOTE (EICU) - ASSESSMENT
Pt VSS, on vent.  Transport to [facility] is arranged for 7 pm tonight, bedside team asking if versed drip would be appropriate to control movements and vent synchrony while being transported to [facility].
written for ativan 1mg q8 hours as needed for agitation via PEG

## 2021-12-14 NOTE — PROGRESS NOTE ADULT - ASSESSMENT
75 yo F sent from [facility] [place] with abnormal labs. Patient unable to contribute to history. Had labs drawn this afternoon and reportedly has a Hgb of 6. Patient does not have a vaughn catheter. No report of fever, vomiting, bleeding. Receiving IV Zosyn through PICC line rt arm    PEG replaced  ID note reviewed  CM notes reviewed -     Ativan Dose adjusted for agitation  2 diff GI MDs notes reviewed - not a candidate for EGD  on TF - on IVF -   on Hep sq dvt p  GI follow up - serial Hgb  ct imaging reviewed - poss PNA -   vs noted  HD reviewed       HCP  Pt is full code  CCM and Cardio notes reviewed  lives in SNF  vent dep -   end stage dementia  trach and peg  HOB elev  asp prec  oral hygiene  skin care  assist with all needs - ADL  work up in progress

## 2021-12-14 NOTE — PROGRESS NOTE ADULT - SUBJECTIVE AND OBJECTIVE BOX
BRE[facility] BECKHAM    [facility]    Allergies    codeine (Hives)    Intolerances        PAST MEDICAL & SURGICAL HISTORY:  Dementia of frontal lobe type    Aphasic stroke    Diabetes mellitus    Respiratory failure    Hypertension    GERD (gastroesophageal reflux disease)    Constipation    Respiratory failure    CVA (cerebral vascular accident)    HTN (hypertension)    DM (diabetes mellitus)    Advanced dementia    COVID-19 virus detected    Quadriplegia    Pneumonia    Type II diabetes mellitus    Hx of appendectomy    Gastrostomy in place    Tracheostomy in place    Tracheostomy tube present    Feeding by G-tube        FAMILY HISTORY:  No pertinent family history in first degree relatives        Home Medications:  albuterol 90 mcg/inh inhalation aerosol: 2 puff(s) inhaled every 6 hours (08 Dec 2021 16:51)  Bacid (LAC) oral tablet: 2 tab(s) by gastrostomy tube once a day (08 Dec 2021 16:51)  Carafate 1 g/10 mL oral suspension: 10 milliliter(s) by gastrostomy tube 4 times a day (before meals and at bedtime) for 14 days (Started 6/4/21) (08 Dec 2021 16:51)  chlorhexidine 0.12% mucous membrane liquid: 15 milliliter(s) mucous membrane 2 times a day (08 Dec 2021 16:51)  insulin lispro 100 units/mL injectable solution:  injectable; sliding scale coverage  (08 Dec 2021 16:51)  ipratropium-albuterol 0.5 mg-2.5 mg/3 mL inhalation solution: 3 milliliter(s) inhaled 4 times a day (08 Dec 2021 16:51)  LORazepam 1 mg oral tablet: 1 tab(s) by gastrostomy tube 3 times a day, As Needed (08 Dec 2021 16:51)  methocarbamol: 250 milligram(s) by gastrostomy tube 2 times a day (08 Dec 2021 16:51)  pantoprazole 40 mg oral granule, delayed release: 40 milligram(s) by gastrostomy tube 2 times a day (08 Dec 2021 16:51)  polyethylene glycol 3350 oral powder for reconstitution: 17 gram(s) orally every 12 hours (08 Dec 2021 16:51)  ProAir HFA 90 mcg/inh inhalation aerosol: 2 puff(s) inhaled every 6 hours (08 Dec 2021 16:51)  senna 8.6 mg oral tablet: 3 tab(s) by gastrostomy tube once a day (at bedtime) (08 Dec 2021 16:51)  simethicone 80 mg oral tablet, chewable: 1 tab(s) orally every 6 hours (08 Dec 2021 16:51)  Tylenol 325 mg oral tablet: 2 tab(s) by gastrostomy tube once a day; 60 minutes prior to dressing change  (08 Dec 2021 16:51)  Zosyn 3 g-0.375 g/50 mL intravenous solution: intravenous every 8 hours (08 Dec 2021 16:51)      MEDICATIONS  (STANDING):  ALBUTerol    90 MICROgram(s) HFA Inhaler 2 Puff(s) Inhalation every 6 hours  chlorhexidine 0.12% Liquid 15 milliLiter(s) Oral Mucosa every 12 hours  dextrose 40% Gel 15 Gram(s) Oral once  dextrose 5%. 1000 milliLiter(s) (50 mL/Hr) IV Continuous <Continuous>  dextrose 5%. 1000 milliLiter(s) (100 mL/Hr) IV Continuous <Continuous>  dextrose 50% Injectable 25 Gram(s) IV Push once  dextrose 50% Injectable 12.5 Gram(s) IV Push once  dextrose 50% Injectable 25 Gram(s) IV Push once  glucagon  Injectable 1 milliGRAM(s) IntraMuscular once  heparin   Injectable 5000 Unit(s) SubCutaneous every 12 hours  insulin lispro (ADMELOG) corrective regimen sliding scale   SubCutaneous every 6 hours  methocarbamol 250 milliGRAM(s) Oral two times a day  pantoprazole  Injectable 40 milliGRAM(s) IV Push every 12 hours  polyethylene glycol 3350 17 Gram(s) Oral every 12 hours  senna 2 Tablet(s) Oral at bedtime  simethicone 80 milliGRAM(s) Chew every 6 hours  sucralfate suspension 1 Gram(s) Enteral Tube every 6 hours    MEDICATIONS  (PRN):  acetaminophen     Tablet .. 650 milliGRAM(s) Oral every 6 hours PRN Temp greater or equal to 38C (100.4F), Mild Pain (1 - 3)  LORazepam   Injectable 1 milliGRAM(s) IV Push every 4 hours PRN Agitation      Diet, NPO with Tube Feed:   Tube Feeding Modality: Gastrostomy  Glucerna 1.5 Horacio  Total Volume for 24 Hours (mL): 1000  Continuous  Starting Tube Feed Rate mL per Hour: 20  Increase Tube Feed Rate by (mL): 10     Every 6 hours  Until Goal Tube Feed Rate (mL per Hour): 50  Tube Feed Duration (in Hours): 20  Tube Feed Start Time: 17:00 (12-09-21 @ 16:49) [Active]          Vital Signs Last 24 Hrs  T(C): 36.8 (14 Dec 2021 08:14), Max: 36.9 (14 Dec 2021 03:36)  T(F): 98.3 (14 Dec 2021 08:14), Max: 98.4 (14 Dec 2021 03:36)  HR: 67 (14 Dec 2021 08:00) (53 - 77)  BP: 129/62 (14 Dec 2021 08:00) (102/54 - 143/85)  BP(mean): 83 (14 Dec 2021 08:00) (69 - 102)  RR: 17 (14 Dec 2021 08:00) (0 - 18)  SpO2: 100% (14 Dec 2021 08:00) (98% - 100%)      12-13-21 @ 07:01  -  12-14-21 @ 07:00  --------------------------------------------------------  IN: 2050 mL / OUT: 850 mL / NET: 1200 mL        Mode: AC/ CMV (Assist Control/ Continuous Mandatory Ventilation), RR (machine): 18, TV (machine): 400, FiO2: 40, PEEP: 5, ITime: 1, MAP: 10, PIP: 32      LABS:                        8.9    5.60  )-----------( 236      ( 13 Dec 2021 06:20 )             30.5     12-13    142  |  108  |  27<H>  ----------------------------<  145<H>  4.4   |  25  |  1.03    Ca    8.3<L>      13 Dec 2021 06:20  Phos  1.8     12-13  Mg     2.1     12-13                WBC:  WBC Count: 5.60 K/uL (12-13 @ 06:20)  WBC Count: 5.82 K/uL (12-12 @ 06:29)  WBC Count: 6.98 K/uL (12-11 @ 09:39)      MICROBIOLOGY:  RECENT CULTURES:  12-11 .Sputum Sputum Serratia marcescens  Pseudomonas aeruginosa (Carbapenem Resistant)  Stenotrophomonas maltophilia   Moderate polymorphonuclear leukocytes per low power field  Rare Squamous epithelial cells per low power field  Numerous Gram Negative Rods seen per oil power field   Few Pseudomonas aeruginosa (Carbapenem Resistant)  Moderate Serratia marcescens  Moderate Stenotrophomonas maltophilia  Normal Respiratory Elvira present    12-08 Clean Catch Clean Catch (Midstream) XXXX XXXX   <10,000 CFU/mL Normal Urogenital Elvira    12-08 .Blood Blood-Peripheral XXXX XXXX   No Growth Final                    Sodium:  Sodium, Serum: 142 mmol/L (12-13 @ 06:20)  Sodium, Serum: 141 mmol/L (12-12 @ 06:29)  Sodium, Serum: 137 mmol/L (12-11 @ 09:40)      1.03 mg/dL 12-13 @ 06:20  1.13 mg/dL 12-12 @ 06:29  1.22 mg/dL 12-11 @ 09:40      Hemoglobin:  Hemoglobin: 8.9 g/dL (12-13 @ 06:20)  Hemoglobin: 9.1 g/dL (12-12 @ 06:29)  Hemoglobin: 9.4 g/dL (12-11 @ 09:39)      Platelets: Platelet Count - Automated: 236 K/uL (12-13 @ 06:20)  Platelet Count - Automated: 235 K/uL (12-12 @ 06:29)  Platelet Count - Automated: 224 K/uL (12-11 @ 09:39)              RADIOLOGY & ADDITIONAL STUDIES:      MICROBIOLOGY:  RECENT CULTURES:  12-11 .Sputum Sputum Serratia marcescens  Pseudomonas aeruginosa (Carbapenem Resistant)  Stenotrophomonas maltophilia   Moderate polymorphonuclear leukocytes per low power field  Rare Squamous epithelial cells per low power field  Numerous Gram Negative Rods seen per oil power field   Few Pseudomonas aeruginosa (Carbapenem Resistant)  Moderate Serratia marcescens  Moderate Stenotrophomonas maltophilia  Normal Respiratory Elvira present    12-08 Clean Catch Clean Catch (Midstream) XXXX XXXX   <10,000 CFU/mL Normal Urogenital Elvira    12-08 .Blood Blood-Peripheral XXXX XXXX   No Growth Final

## 2021-12-14 NOTE — PROVIDER CONTACT NOTE (EICU) - SITUATION
eLerted by bedside team. Request for fentanyl 25 mcg IVP x 1 for vent dyssynchrony and agitation.  Orders placed and imaging/results to be followed up by primary team.
eLerted by bedside team. Request for propofol continuous infusion for better control over agitation and vent dyssynchrony for interfacility transport.  Orders placed
bedside nurse alerting. pt in respiratory distress, tachypneic, discoordinate on vent, had oxygen desaturation, suctioned.
eLerted by bedside team. Paramedics/transport team attempting to switch patient over to transport vent, but unable to 2/2 vent dyssynchrony.  Request for fentanyl 50 mcg IVP x 1. Orders placed
eLerted by bedside team. Patient tachypneic, hypoxic and in severe respiratory distress.  Respiratory rate in the 40's -50's with saturations in the lower 80's.  Breath stacking and ventilatory dyssynchrony.  Versed 2 mg IVP and fentanyl 100 mcg IVP given with good effect.  CXR and ABG pending.  RRT called during event with primary team bedside.
Concern for seizures by the bedside team

## 2021-12-14 NOTE — PROVIDER CONTACT NOTE (EICU) - BACKGROUND
75 chronic respiratory failure vent dependent via trach, hx SAH, PEG admitted with sepsis now with jerking movements concerning for seizures as per bedside team and neurology.  Awaiting transfer to [facility] ICU given concern for ongoing seizures.  EICU contacted for reccs re: sedation to transfer patient to [facility].

## 2021-12-14 NOTE — PROGRESS NOTE ADULT - ASSESSMENT
75F with chronic resp failure - vent dependent, hx of subarachnoid hemorrhage, hx of cardiac arrest, dementia s/p PEG, HTN, DM2 on insulin, hx of CVA, GERD, COPD, admission here from 9/25 to 10/4 and 11/7-11/11 for respiratory failure/sepsis possibly 2/2 aspiration vs vent associated PNA who presents from [facility] for abnormal labs.    #Seizures  Patient had episodes of facial flushing, limb contraction and eye rolling for 1.5 hours  RRT was called. Was given Ativan, Versed and Fentanyl for seizures  Stat labs, CXR ordered which shows increased white count, ? new pna vs seizures  Started on Zosyn again, blood and sputum cultures ordered  Neurology recommended cont EEG,        #Anemia   HGB improving   Has hx of anemia on previous hospitalizations  Occult positive    GI consulted Dr. Nieves, no plans for scope  S/p 2 units PRBC on admission    demanding endoscopy and not realistic about expectations despite multiple conversations with myself and Dr. Nieves.   Alternate opinion from GI Dr. Dupree appreciated. No plan for endoscopy   Hematology input appreciated: would check CBC weekly if possible and arrange for ambulatory transfusions as needed.  Expect patient will remain transfusion dependent.  Would not treat with ESAs at present     # RYAN   Improving   Baseline creatinine .9  Avoid nephrotoxic meds  gentle hydration with ns   repeat BMP in AM  Strict I&O's  Nephrology following     #PEG tube malfunction  GI following  Woody in place in interim   Plan to replace PEG tube     #Hyponatremia  Gentle hydration with ns  improving  trend bmp     # aspiration PNA  - Sepsis poa  - On empiric abx with Zosyn, vanc dc'd   - ID following, reccs appreciated   - Follow blood and urine cultures    #COPD:  - c/w albuterol  - pulm (Jaime), recs appreciated    #GERD:  - c/w carafate and PPI     # VTE ppx:  - Heparin for DVT ppx  75F with chronic resp failure - vent dependent, hx of subarachnoid hemorrhage, hx of cardiac arrest, dementia s/p PEG, HTN, DM2 on insulin, hx of CVA, GERD, COPD, admission here from 9/25 to 10/4 and 11/7-11/11 for respiratory failure/sepsis possibly 2/2 aspiration vs vent associated PNA who presents from [facility] for abnormal labs.    #Seizures  Patient had episodes of facial flushing, limb contraction and eye rolling for 1.5 hours  RRT was called. Was given Ativan, Versed and Fentanyl for seizures  Stat labs, CXR ordered which shows increased white count, ? new pna vs seizures  Started on Zosyn again, blood and sputum cultures ordered  Neurology recommended cont EEG, planned for transfer to [facility]    As patient was being transferred from hospital vent to ambulance vent, patient required full vent support, started on Propofol and BP dropped to 80's.   Planned for trial of Versed and transfer with critical care ambulance if stable   Transfer center aware and updated. E-icu aware of all events       #Anemia   HGB improving   Has hx of anemia on previous hospitalizations  Occult positive    GI consulted Dr. Nieves, no plans for scope  S/p 2 units PRBC on admission   Alternate opinion from GI Dr. Dupree appreciated. No plan for endoscopy   Hematology input appreciated: would check CBC weekly if possible and arrange for ambulatory transfusions as needed.  Expect patient will remain transfusion dependent.  Would not treat with ESAs at present     # RYAN   stable  Baseline creatinine .9  Avoid nephrotoxic meds  repeat BMP in AM  Strict I&O's  Nephrology following     #PEG tube malfunction  GI following  Woody in place in interim   PEG tube replaced inpatient on 12/13    #Hyponatremia  improved  trend bmp     # aspiration PNA  - Sepsis poa  - On Zosyn, per ID: covering pseudomonas and serratia but not stenotrophomonas therefore likely colonization and not infection & therefore will not adjust therapy to cover this (also would involve large doses of bactrim & pt w/ CKD)  s/p levofloxacin 750mg IV x 1 on 12/13  - CXR on 12/14 with increasing infiltrates, s/p Lasix 40mg x1, restarted on abx   - ID following  - New blood cultures sent on 12/14    #COPD:  - c/w albuterol  - pulm (Jaime), recs appreciated    #GERD:  - c/w carafate and PPI     # VTE ppx:  - Heparin for DVT ppx     Had extensive conversation with  Marciano. He is in agreement with transfer to [facility] under ICU level of care

## 2021-12-14 NOTE — PROGRESS NOTE ADULT - ASSESSMENT
75F with chronic resp failure - vent dependent, hx of subarachnoid hemorrhage, hx of cardiac arrest, dementia s/p PEG, HTN, DM2 on insulin, hx of CVA, GERD, COPD, admission here from 9/25 to 10/4 and 11/7-11/11 for respiratory failure/sepsis possibly 2/2 aspiration vs vent associated PNA who presents from [facility] for abnormal labs.  Patient does not contribute to history. In the ED, patient's initial triage vitals were BP 88/37, HR 81, RR 18, 100% on vent and T 99 F.  Labs showed leukocytosis of 12.18, HGB 5.2, BUN/Cr 89/2.00. CXR: Tracheostomy cannula reidentified in position. No change heart mediastinum. Widespread bilateral airspace disease slightly improved.  correlate for pulmonary edema and/or infection. No significant pleural effusion. No pneumothorax. Patient's hand projects over portion of the right base. CT chest and A/P pending  (08 Dec 2021 15:50)      ACUTE RENAL FAILURE: sodium chloride 0.9%. 1000 milliLiter(s) (65 mL/Hr  Serum creatinine is improving    There is no progression . No uremic symptoms  No evidence of anemia .  Fluid status stable.  Will continue to avoid nephrotoxic drugs.  Patient remains asymptomatic.   Continue current therapy.    f/u blood and urine cx,serial lactate levels,monitor vitals roddy raymundo hydration,monitor urine output and renal profile,    hypotension midodrine 5 milliGRAM(s) Oral every 8 hours

## 2021-12-14 NOTE — DISCHARGE NOTE NURSING/CASE MANAGEMENT/SOCIAL WORK - NSDCPEFALRISK_GEN_ALL_CORE
For information on Fall & Injury Prevention, visit: [email or web address] OR  [email or web address] OR  https://www.cdc.gov/steadi/patient.html

## 2021-12-14 NOTE — PROGRESS NOTE ADULT - ASSESSMENT
CHAPINCITO GUIDRY 75 f [facility] S 12/8/2021   DR ILIANA KEMP     REVIEW OF SYMPTOMS      Able to give (reliable) ROS  NO     PHYSICAL EXAM    HEENT Unremarkable  atraumatic   RESP Fair air entry EXP prolonged    Harsh breath sound Resp distres mild   CARDIAC S1 S2 No S3     NO JVD    ABDOMEN SOFT BS PRESENT NOT DISTENDED No hepatosplenomegaly   PEDAL EDEMA present No calf tenderness  NO rash       ________________  Age DOA CC.  76 year old female with a history of Pneumonia  HTN, DM, CVA, dementia, chronic respiratory failure s/p trach, PEG, cardiac arrest who presented 12/8/2021 from NH with abnormal labs. She was found to have a hemoglobin of 6. No further history could be obtained from patient.  Pulm crit care consulted 12/8/2021     _____                            GOC.12/8/2021 Full code   COVID STATUS. 12/8/2021 scv2 (-)   ICU STAY. Admitted ICU 12/8/2021   ABIO.   12/13/2021 Levaquin 750 1 dose Dr Medellin  (12/11 sp stenotrophomonas)   12/8 zosyn x 7d Pneum  (12/11 sp serrat CRP      PATIENT DATA   VITALS/PO/IO/VENT/DRIPS.   12/14/2021 75 90/50   12/14/2021 ac 18/400/5/.4       BEST PRACTICE ISSUES.                                                  HEAD OF BED ELEVATION. Yes  DVT PROPHYLAXIS.      [facility]   12/8/2021 No pharmac pplx as pt possibly has abla on admission 12/8/2021                                    SQUIRES PROPHYLAXIS.      12/8/2021 IV protonix 40.2       12/9/2021 carafate 1.4                                                  DIET.            12/9/2021 glucerna 1.5 1000 12/9 12/8/2021 npo till gi bleed ruled out              INFECTION PROPHYLAXIS.    12/8/2021 chlorhexidine 0.12% bid     GENERAL ISSUES                                       ALLERGY.         codeine                   WEIGHT.           12/8/2021 61                           BMI.                   12/8/2021 22  SPEECH SWALLOW RECOMMENDATIONS.   IV FLUIDS.   12/12/2021 ns 50 x 2d      ASSESSMENT/RECOMMENDATIONS.    RESP.   Trach  SP Trach pmh     VENT DYSSYNCHRONY NOTED 12/14/2021 .  12/14/2021 Fentanyl    12/14/2021 Versed        COPD pmh.   12/9/2021 albuterol hf.4   12/9/2021 Spiriva   Cont Rx      VENT MANAGEMENT.  VENT DEPENDENT RESP FAILURE pmh.  Clinically stable on current settings     HEMODYNAMICS.   HYPOTENSION poa.   9/8/2021 ECHO n lvsf mild dd pasp 51   BP has improvd   target map 65 (+)     INFECTION.   Pneumonia  poa 12/8/2021   w 12/9-12/11-12/14/2021 w 8.8 - 6.9-17   12/9 pr 6.7   CXR 12/14/2021 bl opac poss effsns maddie l side   CXR 12/8 widespread airspace dis sl improvd cw 11/9/2021 12/8 ct cap distal airway impaction and mf pneum related to aspirat   Changes are either new or unchanged   Volume loss rll has improvd since 11/7/2021   Small l effsn     12/8 mrsa (-)   12/8 blod c (-)  12/8 urine c (-)   12/11 sputum stenotrophomonas levaq sens  12/11 sputum CRP Pseudomonas  12/11 sputu serratia   12/13/2021 Levaquin 750 1 dose Dr Medellin  12/8 Zosyn x 7d     SEVERE ANEMIA poa   Hb 12/8-12/8-12/9-12/11-12/12-12/13-12/14/2021   Hb 5.2 -9.7-8.6 - 9.4-9.1-8.9 -9.6  Hb stable     GERD pmh.  On ppi     sp PEG pmh    GI BLEED poa 12/8 12/9/2021 SOB (+)   12/9/2021 Seen by Dr Nieves No intervention pplannd   12/8/2021 IV protonix 40.2       12/9/2021 carafate 1.4                12/9/2021 Dr BETH nassar dvt pplx [facility]   12/11/2021 gi not planning any pproced    SEIZURES pmh   Takes lorazepam 1.3   12/14/2021 plan [facility] for eeg monitoring      OVERALL ISSUES  76 full code Anoxic encephalopathy vdrf peg trach patient age   VENT DYSSYNCHRONY Prolonged episode 12/14/2021   ANEMIA Monitor Hb   PNEUM zosyn (crp) One dose levaq 12/13 (steno)  HYPONA Monitor   SEIZURES 12/14/2021   POOR IV ACCESS 12/10/2021 For midline     TIME SPENT   Over 36 minutes aggregate critical care time spent on encounter; activities included   direct patient care, counseling and/or coordinating care reviewing notes, lab data/ imaging , discussion with multidisciplinary team/ patient  /family and explaining in detail risks, benefits, alternatives  of the recommendations     CHAPINCITO GUIDRY 75 f [facility] S 12/8/2021   DR ILIANA KEMP

## 2021-12-14 NOTE — PROVIDER CONTACT NOTE (EICU) - ACTION/TREATMENT ORDERED:
Fentanyl 100 mcg IVP x 1  Versed 2 mg IVP x 1  CXR stat  ABG stat
will change route of ativan order from PEG to IVP prn q8 hours for quicker onset of action
Team ordered versed

## 2021-12-14 NOTE — PROGRESS NOTE ADULT - TIME BILLING
The total amount of time listed was spent reviewing the hospital notes, laboratory values, imaging findings, assessing/counseling the patient, discussing with pulmonary, case management, social work, and nursing staff.
The total amount of time listed was spent reviewing the hospital notes, laboratory values, imaging findings, assessing/counseling the patient, discussing with GI, pulmonary, case management, social work, and nursing staff.
The total amount of time listed was spent reviewing the hospital notes, laboratory values, imaging findings, assessing/counseling the patient, discussing with pulmonary, case management, social work, and nursing staff.
The total amount of time listed was spent reviewing the hospital notes, laboratory values, imaging findings, assessing/counseling the patient, discussing with GI, pulmonary, case management, social work, and nursing staff.

## 2021-12-15 LAB
ALBUMIN SERPL ELPH-MCNC: 2.2 G/DL — LOW (ref 3.3–5)
ALP SERPL-CCNC: 133 U/L — HIGH (ref 30–120)
ALT FLD-CCNC: 21 U/L DA — SIGNIFICANT CHANGE UP (ref 10–60)
ANION GAP SERPL CALC-SCNC: 12 MMOL/L — SIGNIFICANT CHANGE UP (ref 5–17)
AST SERPL-CCNC: 21 U/L — SIGNIFICANT CHANGE UP (ref 10–40)
BILIRUB SERPL-MCNC: 0.5 MG/DL — SIGNIFICANT CHANGE UP (ref 0.2–1.2)
BUN SERPL-MCNC: 25 MG/DL — HIGH (ref 7–23)
CALCIUM SERPL-MCNC: 8.5 MG/DL — SIGNIFICANT CHANGE UP (ref 8.4–10.5)
CALCIUM SERPL-MCNC: 8.7 MG/DL — SIGNIFICANT CHANGE UP (ref 8.4–10.5)
CHLORIDE SERPL-SCNC: 108 MMOL/L — SIGNIFICANT CHANGE UP (ref 96–108)
CO2 SERPL-SCNC: 23 MMOL/L — SIGNIFICANT CHANGE UP (ref 22–31)
CREAT SERPL-MCNC: 1.1 MG/DL — SIGNIFICANT CHANGE UP (ref 0.5–1.3)
GLUCOSE BLDC GLUCOMTR-MCNC: 146 MG/DL — HIGH (ref 70–99)
GLUCOSE BLDC GLUCOMTR-MCNC: 156 MG/DL — HIGH (ref 70–99)
GLUCOSE BLDC GLUCOMTR-MCNC: 171 MG/DL — HIGH (ref 70–99)
GLUCOSE BLDC GLUCOMTR-MCNC: 182 MG/DL — HIGH (ref 70–99)
GLUCOSE SERPL-MCNC: 158 MG/DL — HIGH (ref 70–99)
MAGNESIUM SERPL-MCNC: 2 MG/DL — SIGNIFICANT CHANGE UP (ref 1.6–2.6)
PHOSPHATE SERPL-MCNC: 3.4 MG/DL — SIGNIFICANT CHANGE UP (ref 2.5–4.5)
POTASSIUM SERPL-MCNC: 4.7 MMOL/L — SIGNIFICANT CHANGE UP (ref 3.5–5.3)
POTASSIUM SERPL-SCNC: 4.7 MMOL/L — SIGNIFICANT CHANGE UP (ref 3.5–5.3)
PROT SERPL-MCNC: 6.1 G/DL — SIGNIFICANT CHANGE UP (ref 6–8.3)
PTH-INTACT FLD-MCNC: 61 PG/ML — SIGNIFICANT CHANGE UP (ref 15–65)
SODIUM SERPL-SCNC: 143 MMOL/L — SIGNIFICANT CHANGE UP (ref 135–145)
TSH SERPL-MCNC: 3.1 UIU/ML — SIGNIFICANT CHANGE UP (ref 0.27–4.2)

## 2021-12-15 PROCEDURE — 71045 X-RAY EXAM CHEST 1 VIEW: CPT | Mod: 26

## 2021-12-15 PROCEDURE — 99233 SBSQ HOSP IP/OBS HIGH 50: CPT

## 2021-12-15 RX ORDER — MIDAZOLAM HYDROCHLORIDE 1 MG/ML
2 INJECTION, SOLUTION INTRAMUSCULAR; INTRAVENOUS ONCE
Refills: 0 | Status: DISCONTINUED | OUTPATIENT
Start: 2021-12-15 | End: 2021-12-15

## 2021-12-15 RX ORDER — MIDAZOLAM HYDROCHLORIDE 1 MG/ML
2 INJECTION, SOLUTION INTRAMUSCULAR; INTRAVENOUS
Refills: 0 | Status: DISCONTINUED | OUTPATIENT
Start: 2021-12-15 | End: 2021-12-19

## 2021-12-15 RX ADMIN — MIDAZOLAM HYDROCHLORIDE 2 MILLIGRAM(S): 1 INJECTION, SOLUTION INTRAMUSCULAR; INTRAVENOUS at 04:37

## 2021-12-15 RX ADMIN — Medication 2: at 06:21

## 2021-12-15 RX ADMIN — ALBUTEROL 2 PUFF(S): 90 AEROSOL, METERED ORAL at 14:12

## 2021-12-15 RX ADMIN — CHLORHEXIDINE GLUCONATE 15 MILLILITER(S): 213 SOLUTION TOPICAL at 05:39

## 2021-12-15 RX ADMIN — SIMETHICONE 80 MILLIGRAM(S): 80 TABLET, CHEWABLE ORAL at 23:33

## 2021-12-15 RX ADMIN — PANTOPRAZOLE SODIUM 40 MILLIGRAM(S): 20 TABLET, DELAYED RELEASE ORAL at 05:38

## 2021-12-15 RX ADMIN — HEPARIN SODIUM 5000 UNIT(S): 5000 INJECTION INTRAVENOUS; SUBCUTANEOUS at 05:38

## 2021-12-15 RX ADMIN — CHLORHEXIDINE GLUCONATE 15 MILLILITER(S): 213 SOLUTION TOPICAL at 18:09

## 2021-12-15 RX ADMIN — METHOCARBAMOL 500 MILLIGRAM(S): 500 TABLET, FILM COATED ORAL at 18:09

## 2021-12-15 RX ADMIN — PANTOPRAZOLE SODIUM 40 MILLIGRAM(S): 20 TABLET, DELAYED RELEASE ORAL at 18:08

## 2021-12-15 RX ADMIN — METHOCARBAMOL 500 MILLIGRAM(S): 500 TABLET, FILM COATED ORAL at 05:38

## 2021-12-15 RX ADMIN — PIPERACILLIN AND TAZOBACTAM 25 GRAM(S): 4; .5 INJECTION, POWDER, LYOPHILIZED, FOR SOLUTION INTRAVENOUS at 15:39

## 2021-12-15 RX ADMIN — Medication 2: at 23:29

## 2021-12-15 RX ADMIN — Medication 1 GRAM(S): at 18:09

## 2021-12-15 RX ADMIN — SIMETHICONE 80 MILLIGRAM(S): 80 TABLET, CHEWABLE ORAL at 05:38

## 2021-12-15 RX ADMIN — MIDAZOLAM HYDROCHLORIDE 2 MILLIGRAM(S): 1 INJECTION, SOLUTION INTRAMUSCULAR; INTRAVENOUS at 23:25

## 2021-12-15 RX ADMIN — HEPARIN SODIUM 5000 UNIT(S): 5000 INJECTION INTRAVENOUS; SUBCUTANEOUS at 18:08

## 2021-12-15 RX ADMIN — ALBUTEROL 2 PUFF(S): 90 AEROSOL, METERED ORAL at 06:40

## 2021-12-15 RX ADMIN — PIPERACILLIN AND TAZOBACTAM 25 GRAM(S): 4; .5 INJECTION, POWDER, LYOPHILIZED, FOR SOLUTION INTRAVENOUS at 07:58

## 2021-12-15 RX ADMIN — PIPERACILLIN AND TAZOBACTAM 25 GRAM(S): 4; .5 INJECTION, POWDER, LYOPHILIZED, FOR SOLUTION INTRAVENOUS at 22:19

## 2021-12-15 RX ADMIN — PIPERACILLIN AND TAZOBACTAM 25 GRAM(S): 4; .5 INJECTION, POWDER, LYOPHILIZED, FOR SOLUTION INTRAVENOUS at 01:14

## 2021-12-15 RX ADMIN — SIMETHICONE 80 MILLIGRAM(S): 80 TABLET, CHEWABLE ORAL at 18:09

## 2021-12-15 RX ADMIN — ALBUTEROL 2 PUFF(S): 90 AEROSOL, METERED ORAL at 00:56

## 2021-12-15 RX ADMIN — ALBUTEROL 2 PUFF(S): 90 AEROSOL, METERED ORAL at 19:59

## 2021-12-15 RX ADMIN — SENNA PLUS 2 TABLET(S): 8.6 TABLET ORAL at 22:19

## 2021-12-15 RX ADMIN — SIMETHICONE 80 MILLIGRAM(S): 80 TABLET, CHEWABLE ORAL at 11:54

## 2021-12-15 RX ADMIN — Medication 1 GRAM(S): at 11:54

## 2021-12-15 RX ADMIN — Medication 2: at 18:09

## 2021-12-15 RX ADMIN — Medication 1 GRAM(S): at 23:33

## 2021-12-15 RX ADMIN — Medication 1 GRAM(S): at 05:38

## 2021-12-15 NOTE — PROGRESS NOTE ADULT - ASSESSMENT
75F with chronic resp failure - vent dependent, hx of subarachnoid hemorrhage, hx of cardiac arrest, dementia s/p PEG, HTN, DM2 on insulin, hx of CVA, GERD, COPD, admission here from 9/25 to 10/4 and 11/7-11/11 for respiratory failure/sepsis possibly 2/2 aspiration vs vent associated PNA who presents from [facility] w leukocytosis of 12.18, and concerns for repeat aspiration PNA    CT-Distal airways impaction and multifocal pneumonia, possibly related to aspiration. Most of the opacities are either new or unchanged since prior study.     RECOMMENDATIONS  respiratory culture w/ pseudomonas, Stenotrophomonas and Serratia marcescens  s/p 6 day course of zosyn  has clinically improved despite zosyn only covering pseudomonas and serratia but not stenotrophomonas therefore likely colonization and not infection & therefore will not adjust therapy to cover this (also would involve large doses of bactrim & pt w/ CKD)  s/p levofloxacin 750mg IV x 1 on 12/13  pt w/ seizure like activity 12/14, now planned for transfer to [place] for continuous EEG monitoring  leukocytosis possible reactive 2/2 seizure vs aspiration pneumonia or pneumonitis during seizure  FiO2 50% today vs 40% yesterday  CXR 12/14 w/ increased bibasilar infiltrates compared with 12/8  c/w zosyn for now  f/u blood cultures    Emil Medellin M.D.  [phone]  [facility], Division of Infectious Diseases  [phone]  After 5pm on weekdays and all day on weekends - please call [phone]

## 2021-12-15 NOTE — PROGRESS NOTE ADULT - SUBJECTIVE AND OBJECTIVE BOX
Chief Complaint: Abnormal labs    Interval Events: Seizure yesterday.    Review of Systems:  Unable to obtain    Physical Exam:  Vital Signs Last 24 Hrs  T(C): 36.8 (15 Dec 2021 08:30), Max: 37.1 (15 Dec 2021 00:04)  T(F): 98.3 (15 Dec 2021 08:30), Max: 98.8 (15 Dec 2021 00:04)  HR: 62 (15 Dec 2021 08:00) (55 - 135)  BP: 127/74 (15 Dec 2021 08:00) (90/53 - 171/59)  BP(mean): 91 (15 Dec 2021 08:00) (70 - 113)  RR: 27 (15 Dec 2021 08:00) (17 - 45)  SpO2: 100% (15 Dec 2021 08:00) (79% - 100%)  General: Unresponsive  HEENT: Trach  Neck: No JVD, no carotid bruit  Lungs: CTAB  CV: RRR, nl S1/S2, no M/R/G  Abdomen: S/NT/ND, +BS  Extremities: No LE edema, no cyanosis  Neuro: AAOx0  Skin: No rash    Labs:    12-15    143  |  108  |  25<H>  ----------------------------<  158<H>  4.7   |  23  |  1.10    Ca    8.5      15 Dec 2021 07:27  Phos  3.4     12-15  Mg     2.0     12-15    TPro  6.1  /  Alb  2.2<L>  /  TBili  0.5  /  DBili  x   /  AST  21  /  ALT  21  /  AlkPhos  133<H>  12-15                        9.6    17.35 )-----------( 332      ( 14 Dec 2021 13:58 )             32.9     Telemetry: Sinus rhythm

## 2021-12-15 NOTE — PROGRESS NOTE ADULT - SUBJECTIVE AND OBJECTIVE BOX
Chief Complaint:  events yesterday noted   ? pending transfer to outside facility      INTERVAL HPI/OVERNIGHT EVENTS:        MEDICATIONS  (STANDING):  ALBUTerol    90 MICROgram(s) HFA Inhaler 2 Puff(s) Inhalation every 6 hours  chlorhexidine 0.12% Liquid 15 milliLiter(s) Oral Mucosa every 12 hours  dextrose 40% Gel 15 Gram(s) Oral once  dextrose 5%. 1000 milliLiter(s) (50 mL/Hr) IV Continuous <Continuous>  dextrose 5%. 1000 milliLiter(s) (100 mL/Hr) IV Continuous <Continuous>  dextrose 50% Injectable 12.5 Gram(s) IV Push once  dextrose 50% Injectable 25 Gram(s) IV Push once  dextrose 50% Injectable 25 Gram(s) IV Push once  glucagon  Injectable 1 milliGRAM(s) IntraMuscular once  heparin   Injectable 5000 Unit(s) SubCutaneous every 12 hours  insulin lispro (ADMELOG) corrective regimen sliding scale   SubCutaneous every 6 hours  methocarbamol 500 milliGRAM(s) Oral two times a day  midazolam Infusion 0.02 mG/kG/Hr (0.89 mL/Hr) IV Continuous <Continuous>  pantoprazole  Injectable 40 milliGRAM(s) IV Push every 12 hours  piperacillin/tazobactam IVPB.. 3.375 Gram(s) IV Intermittent every 8 hours  polyethylene glycol 3350 17 Gram(s) Oral every 12 hours  senna 2 Tablet(s) Oral at bedtime  simethicone 80 milliGRAM(s) Chew every 6 hours  sucralfate suspension 1 Gram(s) Enteral Tube every 6 hours    MEDICATIONS  (PRN):  acetaminophen     Tablet .. 650 milliGRAM(s) Oral every 6 hours PRN Temp greater or equal to 38C (100.4F), Mild Pain (1 - 3)            Vital Signs Last 24 Hrs  T(C): 36.8 (15 Dec 2021 08:30), Max: 37.1 (15 Dec 2021 00:04)  T(F): 98.3 (15 Dec 2021 08:30), Max: 98.8 (15 Dec 2021 00:04)  HR: 63 (15 Dec 2021 10:00) (55 - 135)  BP: 116/62 (15 Dec 2021 10:00) (90/53 - 171/59)  BP(mean): 79 (15 Dec 2021 10:00) (70 - 113)  RR: 18 (15 Dec 2021 10:00) (17 - 45)  SpO2: 100% (15 Dec 2021 10:00) (79% - 100%)    Physical exam:    abd soft, peg c/d/i        LABS:                        9.6    17.35 )-----------( 332      ( 14 Dec 2021 13:58 )             32.9     12-15    143  |  108  |  25<H>  ----------------------------<  158<H>  4.7   |  23  |  1.10    Ca    8.5      15 Dec 2021 07:27  Phos  3.4     12-15  Mg     2.0     12-15    TPro  6.1  /  Alb  2.2<L>  /  TBili  0.5  /  DBili  x   /  AST  21  /  ALT  21  /  AlkPhos  133<H>  12-15          RADIOLOGY & ADDITIONAL TESTS:

## 2021-12-15 NOTE — PROGRESS NOTE ADULT - ASSESSMENT
76 y/o F w/ a PMHx of chronic respiratory failure s/p trach/PEG, subarachnoid hemorrhage, HTN, DM type 2, GERD, and dementia admitted w/:    1. Anemia  2. RYAN  3. Hyponatremia  4. Seizure  5. Aspiration pneumonia    PLAN:  - Continue versed infusion. Add versed 2mg IV PRN for seizure activity.  - One episode of seizure activity in questions overnight. Plan for transfer to [facility] for 24 hr EEG monitoring once a bed becomes available.  - Hemodynamically stable. Maintain MAP > 65.  - Full ventilatory support (18/400/40/5). Actively titrating FiO2 and PEEP to maintain SpO2 > 92%. SBT as tolerated.  - Continue tube feeds.  - Bowel regimen.  - Sputum culture grew carbapenem resistant pseudomonas, serratia, and steno. Zosyn was re-started yesterday in the setting of worsening infiltrates on CXR. ID following.  - Insulin sliding scale coverage q6 hrs.  - Hemoglobin has remained stable.  - Chemical DVT prophylaxis w/ heparin. 74 y/o F w/ a PMHx of chronic respiratory failure s/p trach/PEG, subarachnoid hemorrhage, HTN, DM type 2, GERD, and dementia admitted w/:    1. Anemia  2. RYAN, resolved  3. Hyponatremia  4. Seizure  5. Aspiration pneumonia    PLAN:  - Continue versed infusion. Add versed 2mg IV PRN for seizure activity.  - One episode of seizure activity in questions overnight. Plan for transfer to [facility] for 24 hr EEG monitoring once a bed becomes available.  - Hemodynamically stable. Maintain MAP > 65.  - Full ventilatory support (18/400/40/5). Actively titrating FiO2 and PEEP to maintain SpO2 > 92%. SBT as tolerated.  - Continue tube feeds.  - Bowel regimen.  - Sputum culture grew carbapenem resistant pseudomonas, serratia, and steno. Zosyn was re-started yesterday in the setting of worsening infiltrates on CXR. ID following.  - Insulin sliding scale coverage q6 hrs.  - Hemoglobin has remained stable.  - Chemical DVT prophylaxis w/ heparin.

## 2021-12-15 NOTE — PROGRESS NOTE ADULT - ASSESSMENT
75 [facility] F [facility] with abnormal labs. Patient unable to contribute to history. Had labs drawn this afternoon and reportedly has a Hgb of 6. Patient does not have a vaughn catheter. No report of fever, vomiting, bleeding. Receiving IV Zosyn through PICC line rt arm    PEG replaced  ID note reviewed  CM notes reviewed -     Versed infusion   on Zosyn  2 diff GI MDs notes reviewed - not a candidate for EGD  on TF - on IVF -   on Hep sq dvt p  GI follow up - serial Hgb  ct imaging reviewed - poss PNA -   vs noted  HD reviewed       HCP  Pt is full code  CCM and Cardio notes reviewed  lives in SNF  vent dep -   end stage dementia  trach and peg  HOB elev  asp prec  oral hygiene  skin care  assist with all needs - ADL  work up in progress 77 yo F sent from [facility] home with abnormal labs. Patient unable to contribute to history. Had labs drawn this afternoon and reportedly has a Hgb of 6. Patient does not have a vaughn catheter. No report of fever, vomiting, bleeding. Receiving IV Zosyn through PICC line rt arm    PEG replaced  ID note reviewed  CM notes reviewed -     was not able to transport for [facility] testing  Versed infusion   on Zosyn  2 diff GI MDs notes reviewed - not a candidate for EGD  on TF - on IVF -   on Hep sq dvt p  GI follow up - serial Hgb  ct imaging reviewed - poss PNA -   vs noted  HD reviewed       HCP  Pt is full code  CCM and Cardio notes reviewed  lives in SNF  vent dep -   end stage dementia  trach and peg  HOB elev  asp prec  oral hygiene  skin care  assist with all needs - ADL  work up in progress

## 2021-12-15 NOTE — PROGRESS NOTE ADULT - ASSESSMENT
75F with chronic resp failure - vent dependent, hx of subarachnoid hemorrhage, hx of cardiac arrest, dementia s/p PEG, HTN, DM2 on insulin, hx of CVA, GERD, COPD, admission here from 9/25 to 10/4 and 11/7-11/11 for respiratory failure/sepsis possibly 2/2 aspiration vs vent associated PNA who presents from [facility] for abnormal labs.    #Seizures  Patient had episodes of facial flushing, limb contraction and eye rolling for 1.5 hours  RRT was called. Was given Ativan, Versed and Fentanyl for seizures  Stat labs, CXR ordered which shows increased white count, ? new pna vs seizures  Started on Zosyn again, blood and sputum cultures ordered  Neurology recommended cont EEG, planned for transfer to [place]    As patient was being transferred from hospital vent to ambulance vent, patient required full vent support, started on Propofol and BP dropped to 80's.   Planned for trial of Versed and transfer with critical care ambulance if stable      #Anemia   HGB improving   Has hx of anemia on previous hospitalizations  Occult positive    GI consulted Dr. Nieves, no plans for scope  S/p 2 units PRBC on admission   Alternate opinion from GI Dr. Dupree appreciated. No plan for endoscopy   Hematology input appreciated: would check CBC weekly if possible and arrange for ambulatory transfusions as needed.  Expect patient will remain transfusion dependent.  Would not treat with ESAs at present     # RYAN   stable  Baseline creatinine .9  Avoid nephrotoxic meds  repeat BMP in AM  Strict I&O's  Nephrology following     #PEG tube malfunction  GI following  Woody in place in interim   PEG tube replaced inpatient on 12/13    #Hyponatremia  improved  trend bmp     # aspiration PNA  - Sepsis poa  - On Zosyn, per ID: covering pseudomonas and serratia but not stenotrophomonas therefore likely colonization and not infection & therefore will not adjust therapy to cover this (also would involve large doses of bactrim & pt w/ CKD)  s/p levofloxacin 750mg IV x 1 on 12/13  - CXR on 12/14 with increasing infiltrates, s/p Lasix 40mg x1, restarted on abx   - ID following  - New blood cultures sent on 12/14    #COPD:  - c/w albuterol  - pulm (Jaime), recs appreciated    #GERD:  - c/w carafate and PPI     # VTE ppx:  - Heparin for DVT ppx

## 2021-12-15 NOTE — PROGRESS NOTE ADULT - ASSESSMENT
75F with chronic resp failure - vent dependent, hx of subarachnoid hemorrhage, hx of cardiac arrest, dementia s/p PEG, HTN, DM2 on insulin, hx of CVA, GERD, COPD, admission here from 9/25 to 10/4 and 11/7-11/11 for respiratory failure/sepsis possibly 2/2 aspiration vs vent associated PNA who presents from [facility] for abnormal labs.  Patient does not contribute to history. In the ED, patient's initial triage vitals were BP 88/37, HR 81, RR 18, 100% on vent and T 99 F.  Labs showed leukocytosis of 12.18, HGB 5.2, BUN/Cr 89/2.00. CXR: Tracheostomy cannula reidentified in position. No change heart mediastinum. Widespread bilateral airspace disease slightly improved.  correlate for pulmonary edema and/or infection. No significant pleural effusion. No pneumothorax. Patient's hand projects over portion of the right base. CT chest and A/P pending  (08 Dec 2021 15:50)      ACUTE RENAL FAILURE: sodium chloride 0.9%. 1000 milliLiter(s) (65 mL/Hr  Serum creatinine is improving    There is no progression . No uremic symptoms  No evidence of anemia .  Fluid status stable.  Will continue to avoid nephrotoxic drugs.  Patient remains asymptomatic.   Continue current therapy.    f/u blood and urine cx,serial lactate levels,monitor vitals roddy raymundo hydration,monitor urine output and renal profile,    hypotension midodrine 5 milliGRAM(s) Oral every 8 hours

## 2021-12-15 NOTE — PROGRESS NOTE ADULT - SUBJECTIVE AND OBJECTIVE BOX
Subjective: Patient seen and examined.  Resting.     MEDICATIONS  (STANDING):  ALBUTerol    90 MICROgram(s) HFA Inhaler 2 Puff(s) Inhalation every 6 hours  chlorhexidine 0.12% Liquid 15 milliLiter(s) Oral Mucosa every 12 hours  dextrose 40% Gel 15 Gram(s) Oral once  dextrose 5%. 1000 milliLiter(s) (50 mL/Hr) IV Continuous <Continuous>  dextrose 5%. 1000 milliLiter(s) (100 mL/Hr) IV Continuous <Continuous>  dextrose 50% Injectable 12.5 Gram(s) IV Push once  dextrose 50% Injectable 25 Gram(s) IV Push once  dextrose 50% Injectable 25 Gram(s) IV Push once  glucagon  Injectable 1 milliGRAM(s) IntraMuscular once  heparin   Injectable 5000 Unit(s) SubCutaneous every 12 hours  insulin lispro (ADMELOG) corrective regimen sliding scale   SubCutaneous every 6 hours  methocarbamol 500 milliGRAM(s) Oral two times a day  midazolam Infusion 0.02 mG/kG/Hr (0.89 mL/Hr) IV Continuous <Continuous>  pantoprazole  Injectable 40 milliGRAM(s) IV Push every 12 hours  piperacillin/tazobactam IVPB.. 3.375 Gram(s) IV Intermittent every 8 hours  polyethylene glycol 3350 17 Gram(s) Oral every 12 hours  senna 2 Tablet(s) Oral at bedtime  simethicone 80 milliGRAM(s) Chew every 6 hours  sucralfate suspension 1 Gram(s) Enteral Tube every 6 hours    MEDICATIONS  (PRN):  acetaminophen     Tablet .. 650 milliGRAM(s) Oral every 6 hours PRN Temp greater or equal to 38C (100.4F), Mild Pain (1 - 3)      Allergies    codeine (Hives)    Intolerances        Vital Signs Last 24 Hrs  T(C): 36.8 (15 Dec 2021 08:30), Max: 37.1 (15 Dec 2021 00:04)  T(F): 98.3 (15 Dec 2021 08:30), Max: 98.8 (15 Dec 2021 00:04)  HR: 62 (15 Dec 2021 08:00) (55 - 135)  BP: 127/74 (15 Dec 2021 08:00) (90/53 - 171/59)  BP(mean): 91 (15 Dec 2021 08:00) (70 - 113)  RR: 27 (15 Dec 2021 08:00) (14 - 45)  SpO2: 100% (15 Dec 2021 08:00) (79% - 100%)    PHYSICAL EXAM:  GENERAL: No Distress; Sedated and Trach to Vent   HEAD:  Atraumatic, Normocephalic  ENMT: Moist mucous membranes,   NECK: Trach to Vent  CHEST/LUNG: Coarse BS Bilaterally   HEART: Regular rate and rhythm; No murmurs, rubs, or gallops  ABDOMEN: Soft; + PEG TUBE   EXTREMITIES:  2+ Peripheral Pulses, No clubbing, cyanosis, or edema      LABS:                        9.6    17.35 )-----------( 332      ( 14 Dec 2021 13:58 )             32.9     15 Dec 2021 07:27    143    |  108    |  25     ----------------------------<  158    4.7     |  23     |  1.10     Ca    8.5        15 Dec 2021 07:27  Phos  3.4       15 Dec 2021 07:27  Mg     2.0       15 Dec 2021 07:27    TPro  6.1    /  Alb  2.2    /  TBili  0.5    /  DBili  x      /  AST  21     /  ALT  21     /  AlkPhos  133    15 Dec 2021 07:27        CAPILLARY BLOOD GLUCOSE      POCT Blood Glucose.: 182 mg/dL (15 Dec 2021 06:18)  POCT Blood Glucose.: 114 mg/dL (14 Dec 2021 23:28)  POCT Blood Glucose.: 109 mg/dL (14 Dec 2021 19:21)  POCT Blood Glucose.: 199 mg/dL (14 Dec 2021 12:03)      RADIOLOGY & ADDITIONAL TESTS:    Imaging Personally Reviewed:  [ ] YES     Consultant(s) Notes Reviewed:      Care Discussed with Consultants/Other Providers:    Advanced Directives: [ ] DNR  [ ] No feeding tube  [ ] MOLST in chart  [ ] MOLST completed today  [ ] Unknown

## 2021-12-15 NOTE — PROGRESS NOTE ADULT - SUBJECTIVE AND OBJECTIVE BOX
Date/Time Patient Seen:  		  Referring MD:   Data Reviewed	       Patient is a 76y old  Female who presents with a chief complaint of Anemia (15 Dec 2021 06:41)      Subjective/HPI     PAST MEDICAL & SURGICAL HISTORY:  Dementia of frontal lobe type    Aphasic stroke    Diabetes mellitus    Respiratory failure    Hypertension    GERD (gastroesophageal reflux disease)    Constipation    Respiratory failure    CVA (cerebral vascular accident)    HTN (hypertension)    DM (diabetes mellitus)    Advanced dementia    COVID-19 virus detected    Quadriplegia    Pneumonia    Type II diabetes mellitus    Hx of appendectomy    Gastrostomy in place    Tracheostomy in place    Tracheostomy tube present    Feeding by G-tube          Medication list         MEDICATIONS  (STANDING):  ALBUTerol    90 MICROgram(s) HFA Inhaler 2 Puff(s) Inhalation every 6 hours  chlorhexidine 0.12% Liquid 15 milliLiter(s) Oral Mucosa every 12 hours  dextrose 40% Gel 15 Gram(s) Oral once  dextrose 5%. 1000 milliLiter(s) (50 mL/Hr) IV Continuous <Continuous>  dextrose 5%. 1000 milliLiter(s) (100 mL/Hr) IV Continuous <Continuous>  dextrose 50% Injectable 12.5 Gram(s) IV Push once  dextrose 50% Injectable 25 Gram(s) IV Push once  dextrose 50% Injectable 25 Gram(s) IV Push once  glucagon  Injectable 1 milliGRAM(s) IntraMuscular once  heparin   Injectable 5000 Unit(s) SubCutaneous every 12 hours  insulin lispro (ADMELOG) corrective regimen sliding scale   SubCutaneous every 6 hours  methocarbamol 500 milliGRAM(s) Oral two times a day  midazolam Infusion 0.02 mG/kG/Hr (0.89 mL/Hr) IV Continuous <Continuous>  pantoprazole  Injectable 40 milliGRAM(s) IV Push every 12 hours  piperacillin/tazobactam IVPB.. 3.375 Gram(s) IV Intermittent every 8 hours  polyethylene glycol 3350 17 Gram(s) Oral every 12 hours  senna 2 Tablet(s) Oral at bedtime  simethicone 80 milliGRAM(s) Chew every 6 hours  sucralfate suspension 1 Gram(s) Enteral Tube every 6 hours    MEDICATIONS  (PRN):  acetaminophen     Tablet .. 650 milliGRAM(s) Oral every 6 hours PRN Temp greater or equal to 38C (100.4F), Mild Pain (1 - 3)         Vitals log        ICU Vital Signs Last 24 Hrs  T(C): 37 (15 Dec 2021 04:04), Max: 37.1 (15 Dec 2021 00:04)  T(F): 98.6 (15 Dec 2021 04:04), Max: 98.8 (15 Dec 2021 00:04)  HR: 60 (15 Dec 2021 06:00) (55 - 135)  BP: 104/56 (15 Dec 2021 06:00) (90/53 - 171/59)  BP(mean): 72 (15 Dec 2021 06:00) (70 - 113)  ABP: --  ABP(mean): --  RR: 18 (15 Dec 2021 06:00) (14 - 45)  SpO2: 100% (15 Dec 2021 06:00) (79% - 100%)       Mode: AC/ CMV (Assist Control/ Continuous Mandatory Ventilation)  RR (machine): 18  TV (machine): 400  FiO2: 50  PEEP: 5  ITime: 1  MAP: 9  PIP: 33      Input and Output:  I&O's Detail    13 Dec 2021 07:01  -  14 Dec 2021 07:00  --------------------------------------------------------  IN:    Enteral Tube Flush: 375 mL    Glucerna 1.5: 675 mL    IV PiggyBack: 300 mL    sodium chloride 0.9%: 700 mL  Total IN: 2050 mL    OUT:    Voided (mL): 850 mL  Total OUT: 850 mL    Total NET: 1200 mL      14 Dec 2021 07:01  -  15 Dec 2021 06:54  --------------------------------------------------------  IN:    Enteral Tube Flush: 200 mL    Glucerna 1.5: 400 mL    IV PiggyBack: 100 mL    Midazolam: 8.5 mL  Total IN: 708.5 mL    OUT:    Voided (mL): 1350 mL  Total OUT: 1350 mL    Total NET: -641.5 mL          Lab Data                        9.6    17.35 )-----------( 332      ( 14 Dec 2021 13:58 )             32.9     12-14    142  |  109<H>  |  25<H>  ----------------------------<  201<H>  4.9   |  24  |  1.20    Ca    8.9      14 Dec 2021 15:28  Phos  3.3     12-14  Mg     2.0     12-14    TPro  6.7  /  Alb  2.1<L>  /  TBili  0.3  /  DBili  x   /  AST  20  /  ALT  21  /  AlkPhos  132<H>  12-14            Review of Systems	      Objective     Physical Examination    trach  peg  heart s1s2  lung dec BS      Pertinent Lab findings & Imaging      Annalise:  NO   Adequate UO     I&O's Detail    13 Dec 2021 07:01  -  14 Dec 2021 07:00  --------------------------------------------------------  IN:    Enteral Tube Flush: 375 mL    Glucerna 1.5: 675 mL    IV PiggyBack: 300 mL    sodium chloride 0.9%: 700 mL  Total IN: 2050 mL    OUT:    Voided (mL): 850 mL  Total OUT: 850 mL    Total NET: 1200 mL      14 Dec 2021 07:01  -  15 Dec 2021 06:54  --------------------------------------------------------  IN:    Enteral Tube Flush: 200 mL    Glucerna 1.5: 400 mL    IV PiggyBack: 100 mL    Midazolam: 8.5 mL  Total IN: 708.5 mL    OUT:    Voided (mL): 1350 mL  Total OUT: 1350 mL    Total NET: -641.5 mL               Discussed with:     Cultures:	        Radiology

## 2021-12-15 NOTE — PROGRESS NOTE ADULT - ASSESSMENT
· Assessment	  chronic vent  / resp failure   seziures?  anemia  Peg    s/p replacement of Peg  at bedside this wk-    followup x ray noted- tube feeding tolerated    use peg for medication delivery prn and enteral feeding     peg care per protocol   acid suppression   cont w/ supportive measures.

## 2021-12-15 NOTE — PROGRESS NOTE ADULT - ASSESSMENT
The patient is a 76 year old female with a history of HTN, DM, CVA, dementia, chronic respiratory failure s/p trach, PEG, cardiac arrest who presents from NH with anemia.    Plan:  - Recent echo with normal LV systolic function, mild pulm HTN  - Hemoglobin improved with transfusion  - Remain off of aspirin  - Seizures - EEG and neuro work-up as indicated  - CXR with bilateral infiltrates - aspiration? pulm edema?  - If O2 requirements worsen can do trial of diuretics  - Mechanical ventilation  - Overall poor prognosis. Comfort care would be most appropriate.

## 2021-12-15 NOTE — PROGRESS NOTE ADULT - SUBJECTIVE AND OBJECTIVE BOX
Patient is a 75 y/o female who presents with a chief complaint of anemia (15 Dec 2021 14:40)    BRIEF HOSPITAL COURSE: ***    Events last 24 hours: ***    PAST MEDICAL & SURGICAL HISTORY:  Dementia of frontal lobe type  Aphasic stroke  Diabetes mellitus  Respiratory failure  Hypertension  GERD (gastroesophageal reflux disease)  Constipation  Respiratory failure  CVA (cerebral vascular accident)  HTN (hypertension)  DM (diabetes mellitus)  Advanced dementia  COVID-19 virus detected  Quadriplegia  Pneumonia  Type II diabetes mellitus  Hx of appendectomy  Gastrostomy in place  Tracheostomy in place  Tracheostomy tube present  Feeding by G-tube    Review of Systems:  Unable to obtain 2/2 poor mental status at baseline.    Medications:  piperacillin/tazobactam IVPB.. 3.375 Gram(s) IV Intermittent every 8 hours  ALBUTerol    90 MICROgram(s) HFA Inhaler 2 Puff(s) Inhalation every 6 hours  acetaminophen     Tablet .. 650 milliGRAM(s) Oral every 6 hours PRN  methocarbamol 500 milliGRAM(s) Oral two times a day  midazolam Infusion 0.02 mG/kG/Hr IV Continuous <Continuous>  heparin   Injectable 5000 Unit(s) SubCutaneous every 12 hours  pantoprazole  Injectable 40 milliGRAM(s) IV Push every 12 hours  polyethylene glycol 3350 17 Gram(s) Oral every 12 hours  senna 2 Tablet(s) Oral at bedtime  simethicone 80 milliGRAM(s) Chew every 6 hours  sucralfate suspension 1 Gram(s) Enteral Tube every 6 hours  dextrose 40% Gel 15 Gram(s) Oral once  dextrose 50% Injectable 25 Gram(s) IV Push once  dextrose 50% Injectable 12.5 Gram(s) IV Push once  dextrose 50% Injectable 25 Gram(s) IV Push once  glucagon  Injectable 1 milliGRAM(s) IntraMuscular once  insulin lispro (ADMELOG) corrective regimen sliding scale   SubCutaneous every 6 hours  dextrose 5%. 1000 milliLiter(s) IV Continuous <Continuous>  dextrose 5%. 1000 milliLiter(s) IV Continuous <Continuous>  chlorhexidine 0.12% Liquid 15 milliLiter(s) Oral Mucosa every 12 hours    Mode: AC/ CMV (Assist Control/ Continuous Mandatory Ventilation)  RR (machine): 18  TV (machine): 400  FiO2: 50  PEEP: 5  ITime: 1  MAP: 9  PIP: 27    ICU Vital Signs Last 24 Hrs  T(C): 36.8 (15 Dec 2021 19:29), Max: 37.1 (15 Dec 2021 00:04)  T(F): 98.2 (15 Dec 2021 19:29), Max: 98.8 (15 Dec 2021 00:04)  HR: 59 (15 Dec 2021 18:00) (55 - 105)  BP: 122/54 (15 Dec 2021 18:00) (100/58 - 171/59)  BP(mean): 74 (15 Dec 2021 18:00) (69 - 113)  ABP: --  ABP(mean): --  RR: 17 (15 Dec 2021 18:00) (17 - 37)  SpO2: 100% (15 Dec 2021 18:00) (96% - 100%)    I&O's Detail    14 Dec 2021 07:01  -  15 Dec 2021 07:00  --------------------------------------------------------  IN:    Enteral Tube Flush: 200 mL    Glucerna 1.5: 800 mL    IV PiggyBack: 100 mL    Midazolam: 14.7 mL  Total IN: 1114.7 mL    OUT:    Voided (mL): 1350 mL  Total OUT: 1350 mL    Total NET: -235.3 mL    15 Dec 2021 07:01  -  15 Dec 2021 21:52  --------------------------------------------------------  IN:    Enteral Tube Flush: 90 mL    Glucerna 1.5: 500 mL    Midazolam: 18 mL  Total IN: 608 mL    OUT:  Total OUT: 0 mL    Total NET: 608 mL    LABS:                        9.6    17.35 )-----------( 332      ( 14 Dec 2021 13:58 )             32.9     12-15    143  |  108  |  25<H>  ----------------------------<  158<H>  4.7   |  23  |  1.10    Ca    8.5      15 Dec 2021 07:27  Phos  3.4     12-15  Mg     2.0     12-15    TPro  6.1  /  Alb  2.2<L>  /  TBili  0.5  /  DBili  x   /  AST  21  /  ALT  21  /  AlkPhos  133<H>  12-15    CAPILLARY BLOOD GLUCOSE    POCT Blood Glucose.: 156 mg/dL (15 Dec 2021 17:58)    CULTURES:  Culture Results:   Few Pseudomonas aeruginosa (Carbapenem Resistant)  Moderate Serratia marcescens  Moderate Stenotrophomonas maltophilia  Normal Respiratory Elvira present (12-11 @ 00:41)    Physical Examination:    General: Well appearing, lying in bed in NAD.      HEENT: Pupils equal, reactive to light. Symmetric. No scleral icterus or injection.    PULM: Clear to auscultation B/L. No wheezes, rales, or rhonchi apprecaited. No significant sputum production or increased respiratory effort.    NECK: Supple, no lymphadenopathy, trachea midline.    CVS: Regular rate and rhythm, no murmurs appreciated, +s1/s2.    ABD: Soft, nondistended, nontender, normoactive bowel sounds.    EXT: No edema, nontender.    SKIN: Warm and well perfused, no rashes noted.    NEURO: Alert, oriented, interactive, nonfocal.    RADIOLOGY:  < from: Xray Chest 1 View-PORTABLE IMMEDIATE (Xray Chest 1 View-PORTABLE IMMEDIATE .) (12.14.21 @ 13:46) >  ACC: [id number] EXAM:  XR CHEST PORTABLE IMMED 1V                          PROCEDURE DATE:  12/14/2021      INTERPRETATION:  AP chest on December 14, 2021 at 1:36 PM. Patient is   hypoxic.    Heart size difficult to assess. Tracheostomy again noted.    There are diffuse advanced bilateral infiltrates somewhat increased from   November 9.    IMPRESSION: Advanced infiltrates somewhat increased from prior.    --- End of Report ---    HANSEL ANDRADE MD; Attending Radiologist  This documenthas been electronically signed. Dec 14 2021  1:49PM    < end of copied text >   Patient is a 77 y/o female who presents with a chief complaint of anemia (15 Dec 2021 14:40)    BRIEF HOSPITAL COURSE: 76 y/o F w/ a PMHx of chronic respiratory failure s/p trach/PEG, subarachnoid hemorrhage, HTN, DM type 2, GERD, and dementia from [facility] who was admitted on w/ abnormal lab work. In the ER, labs revealing of anemia, RYAN, and hyponatremia. Pt received 2u PRBCs w/ appropriate response (hgb 5.2 --> 9.7). Course complicated by suspected seizure activity on 12/14 w/ plan for transfer to [facility] for 24 hr EEG.    Events last 24 hours: Versed gtt running at 0.2mcg. One episode of seizure activity in question overnight. Rapidly opening and closing mouth, foaming at the mouth, tachypneic, and tachycardic. Versed 2mg IV ordered, but activity resolved before it was administered.    PAST MEDICAL & SURGICAL HISTORY:  Dementia of frontal lobe type  Aphasic stroke  Diabetes mellitus  Respiratory failure  Hypertension  GERD (gastroesophageal reflux disease)  Constipation  Respiratory failure  CVA (cerebral vascular accident)  HTN (hypertension)  DM (diabetes mellitus)  Advanced dementia  COVID-19 virus detected  Quadriplegia  Pneumonia  Type II diabetes mellitus  Hx of appendectomy  Gastrostomy in place  Tracheostomy in place  Tracheostomy tube present  Feeding by G-tube    Review of Systems:  Unable to obtain 2/2 poor mental status at baseline.    Medications:  piperacillin/tazobactam IVPB.. 3.375 Gram(s) IV Intermittent every 8 hours  ALBUTerol    90 MICROgram(s) HFA Inhaler 2 Puff(s) Inhalation every 6 hours  acetaminophen     Tablet .. 650 milliGRAM(s) Oral every 6 hours PRN  methocarbamol 500 milliGRAM(s) Oral two times a day  midazolam Infusion 0.02 mG/kG/Hr IV Continuous <Continuous>  heparin   Injectable 5000 Unit(s) SubCutaneous every 12 hours  pantoprazole  Injectable 40 milliGRAM(s) IV Push every 12 hours  polyethylene glycol 3350 17 Gram(s) Oral every 12 hours  senna 2 Tablet(s) Oral at bedtime  simethicone 80 milliGRAM(s) Chew every 6 hours  sucralfate suspension 1 Gram(s) Enteral Tube every 6 hours  dextrose 40% Gel 15 Gram(s) Oral once  dextrose 50% Injectable 25 Gram(s) IV Push once  dextrose 50% Injectable 12.5 Gram(s) IV Push once  dextrose 50% Injectable 25 Gram(s) IV Push once  glucagon  Injectable 1 milliGRAM(s) IntraMuscular once  insulin lispro (ADMELOG) corrective regimen sliding scale   SubCutaneous every 6 hours  dextrose 5%. 1000 milliLiter(s) IV Continuous <Continuous>  dextrose 5%. 1000 milliLiter(s) IV Continuous <Continuous>  chlorhexidine 0.12% Liquid 15 milliLiter(s) Oral Mucosa every 12 hours    Mode: AC/ CMV (Assist Control/ Continuous Mandatory Ventilation)  RR (machine): 18  TV (machine): 400  FiO2: 50  PEEP: 5  ITime: 1  MAP: 9  PIP: 27    ICU Vital Signs Last 24 Hrs  T(C): 36.8 (15 Dec 2021 19:29), Max: 37.1 (15 Dec 2021 00:04)  T(F): 98.2 (15 Dec 2021 19:29), Max: 98.8 (15 Dec 2021 00:04)  HR: 59 (15 Dec 2021 18:00) (55 - 105)  BP: 122/54 (15 Dec 2021 18:00) (100/58 - 171/59)  BP(mean): 74 (15 Dec 2021 18:00) (69 - 113)  ABP: --  ABP(mean): --  RR: 17 (15 Dec 2021 18:00) (17 - 37)  SpO2: 100% (15 Dec 2021 18:00) (96% - 100%)    I&O's Detail    14 Dec 2021 07:01  -  15 Dec 2021 07:00  --------------------------------------------------------  IN:    Enteral Tube Flush: 200 mL    Glucerna 1.5: 800 mL    IV PiggyBack: 100 mL    Midazolam: 14.7 mL  Total IN: 1114.7 mL    OUT:    Voided (mL): 1350 mL  Total OUT: 1350 mL    Total NET: -235.3 mL    15 Dec 2021 07:01  -  15 Dec 2021 21:52  --------------------------------------------------------  IN:    Enteral Tube Flush: 90 mL    Glucerna 1.5: 500 mL    Midazolam: 18 mL  Total IN: 608 mL    OUT:  Total OUT: 0 mL    Total NET: 608 mL    LABS:                        9.6    17.35 )-----------( 332      ( 14 Dec 2021 13:58 )             32.9     12-15    143  |  108  |  25<H>  ----------------------------<  158<H>  4.7   |  23  |  1.10    Ca    8.5      15 Dec 2021 07:27  Phos  3.4     12-15  Mg     2.0     12-15    TPro  6.1  /  Alb  2.2<L>  /  TBili  0.5  /  DBili  x   /  AST  21  /  ALT  21  /  AlkPhos  133<H>  12-15    CAPILLARY BLOOD GLUCOSE    POCT Blood Glucose.: 156 mg/dL (15 Dec 2021 17:58)    CULTURES:  Culture Results:   Few Pseudomonas aeruginosa (Carbapenem Resistant)  Moderate Serratia marcescens  Moderate Stenotrophomonas maltophilia  Normal Respiratory Elvira present (12-11 @ 00:41)    Physical Examination:    General: In no acute distress.      HEENT: Pupils equal, reactive to light. Symmetric. No scleral icterus or injection.    PULM: Coarse breath sounds b/l.    NECK: Supple, no lymphadenopathy, trachea midline.    CVS: Regular rate and rhythm, no murmurs appreciated, +s1/s2.    ABD: Soft, nondistended, nontender, normoactive bowel sounds.    EXT: No edema, nontender.    SKIN: Warm and well perfused, no rashes noted.    NEURO: Alert, does not follow commands.    RADIOLOGY:  < from: Xray Chest 1 View-PORTABLE IMMEDIATE (Xray Chest 1 View-PORTABLE IMMEDIATE .) (12.14.21 @ 13:46) >  ACC: [id number] EXAM:  XR CHEST PORTABLE IMMED 1V                          PROCEDURE DATE:  12/14/2021      INTERPRETATION:  AP chest on December 14, 2021 at 1:36 PM. Patient is   hypoxic.    Heart size difficult to assess. Tracheostomy again noted.    There are diffuse advanced bilateral infiltrates somewhat increased from   November 9.    IMPRESSION: Advanced infiltrates somewhat increased from prior.    --- End of Report ---    HANSEL ANDRADE MD; Attending Radiologist  This documenthas been electronically signed. Dec 14 2021  1:49PM    < end of copied text >    CRITICAL CARE TIME SPENT: 35 minutes Patient is a 77 y/o female who presents with a chief complaint of anemia (15 Dec 2021 14:40)    BRIEF HOSPITAL COURSE: 76 y/o F w/ a PMHx of chronic respiratory failure s/p trach/PEG, subarachnoid hemorrhage, HTN, DM type 2, GERD, and dementia from [facility] who was admitted on w/ abnormal lab work. In the ER, labs revealing of anemia, RYAN, and hyponatremia. Pt received 2u PRBCs w/ appropriate response (hgb 5.2 --> 9.7). Course complicated by suspected seizure activity on 12/14 w/ plan for transfer to [facility] for 24 hr EEG.    Events last 24 hours: Versed gtt running at 0.2mcg. One episode of seizure activity in question overnight. Rapidly opening and closing mouth, foaming at the mouth, tachypneic, and tachycardic. Versed 2mg IV ordered, but activity resolved before it was administered.    PAST MEDICAL & SURGICAL HISTORY:  Dementia of frontal lobe type  Aphasic stroke  Diabetes mellitus  Respiratory failure  Hypertension  GERD (gastroesophageal reflux disease)  Constipation  Respiratory failure  CVA (cerebral vascular accident)  HTN (hypertension)  DM (diabetes mellitus)  Advanced dementia  COVID-19 virus detected  Quadriplegia  Pneumonia  Type II diabetes mellitus  Hx of appendectomy  Gastrostomy in place  Tracheostomy in place  Tracheostomy tube present  Feeding by G-tube    Review of Systems:  Unable to obtain 2/2 poor mental status at baseline.    Medications:  piperacillin/tazobactam IVPB.. 3.375 Gram(s) IV Intermittent every 8 hours  ALBUTerol    90 MICROgram(s) HFA Inhaler 2 Puff(s) Inhalation every 6 hours  acetaminophen     Tablet .. 650 milliGRAM(s) Oral every 6 hours PRN  methocarbamol 500 milliGRAM(s) Oral two times a day  midazolam Infusion 0.02 mG/kG/Hr IV Continuous <Continuous>  heparin   Injectable 5000 Unit(s) SubCutaneous every 12 hours  pantoprazole  Injectable 40 milliGRAM(s) IV Push every 12 hours  polyethylene glycol 3350 17 Gram(s) Oral every 12 hours  senna 2 Tablet(s) Oral at bedtime  simethicone 80 milliGRAM(s) Chew every 6 hours  sucralfate suspension 1 Gram(s) Enteral Tube every 6 hours  dextrose 40% Gel 15 Gram(s) Oral once  dextrose 50% Injectable 25 Gram(s) IV Push once  dextrose 50% Injectable 12.5 Gram(s) IV Push once  dextrose 50% Injectable 25 Gram(s) IV Push once  glucagon  Injectable 1 milliGRAM(s) IntraMuscular once  insulin lispro (ADMELOG) corrective regimen sliding scale   SubCutaneous every 6 hours  dextrose 5%. 1000 milliLiter(s) IV Continuous <Continuous>  dextrose 5%. 1000 milliLiter(s) IV Continuous <Continuous>  chlorhexidine 0.12% Liquid 15 milliLiter(s) Oral Mucosa every 12 hours    Mode: AC/ CMV (Assist Control/ Continuous Mandatory Ventilation)  RR (machine): 18  TV (machine): 400  FiO2: 50  PEEP: 5  ITime: 1  MAP: 9  PIP: 27    ICU Vital Signs Last 24 Hrs  T(C): 36.8 (15 Dec 2021 19:29), Max: 37.1 (15 Dec 2021 00:04)  T(F): 98.2 (15 Dec 2021 19:29), Max: 98.8 (15 Dec 2021 00:04)  HR: 59 (15 Dec 2021 18:00) (55 - 105)  BP: 122/54 (15 Dec 2021 18:00) (100/58 - 171/59)  BP(mean): 74 (15 Dec 2021 18:00) (69 - 113)  ABP: --  ABP(mean): --  RR: 17 (15 Dec 2021 18:00) (17 - 37)  SpO2: 100% (15 Dec 2021 18:00) (96% - 100%)    I&O's Detail    14 Dec 2021 07:01  -  15 Dec 2021 07:00  --------------------------------------------------------  IN:    Enteral Tube Flush: 200 mL    Glucerna 1.5: 800 mL    IV PiggyBack: 100 mL    Midazolam: 14.7 mL  Total IN: 1114.7 mL    OUT:    Voided (mL): 1350 mL  Total OUT: 1350 mL    Total NET: -235.3 mL    15 Dec 2021 07:01  -  15 Dec 2021 21:52  --------------------------------------------------------  IN:    Enteral Tube Flush: 90 mL    Glucerna 1.5: 500 mL    Midazolam: 18 mL  Total IN: 608 mL    OUT:  Total OUT: 0 mL    Total NET: 608 mL    LABS:                        9.6    17.35 )-----------( 332      ( 14 Dec 2021 13:58 )             32.9     12-15    143  |  108  |  25<H>  ----------------------------<  158<H>  4.7   |  23  |  1.10    Ca    8.5      15 Dec 2021 07:27  Phos  3.4     12-15  Mg     2.0     12-15    TPro  6.1  /  Alb  2.2<L>  /  TBili  0.5  /  DBili  x   /  AST  21  /  ALT  21  /  AlkPhos  133<H>  12-15    CAPILLARY BLOOD GLUCOSE    POCT Blood Glucose.: 156 mg/dL (15 Dec 2021 17:58)    CULTURES:  Culture Results:   Few Pseudomonas aeruginosa (Carbapenem Resistant)  Moderate Serratia marcescens  Moderate Stenotrophomonas maltophilia  Normal Respiratory Elvira present (12-11 @ 00:41)    Physical Examination:    General: In no acute distress.      HEENT: Pupils equal, reactive to light. Symmetric. No scleral icterus or injection.    PULM: Coarse breath sounds b/l.    NECK: Supple, no lymphadenopathy, trachea midline.    CVS: Regular rate and rhythm, no murmurs appreciated, +s1/s2.    ABD: Soft, nondistended, nontender, normoactive bowel sounds.    EXT: No edema, nontender.    SKIN: Warm and well perfused, no rashes noted.    NEURO: Alert, does not follow commands.    RADIOLOGY:  < from: Xray Chest 1 View-PORTABLE IMMEDIATE (Xray Chest 1 View-PORTABLE IMMEDIATE .) (12.14.21 @ 13:46) >  ACC: [id number] EXAM:  XR CHEST PORTABLE IMMED 1V                          PROCEDURE DATE:  12/14/2021      INTERPRETATION:  AP chest on December 14, 2021 at 1:36 PM. Patient is   hypoxic.    Heart size difficult to assess. Tracheostomy again noted.    There are diffuse advanced bilateral infiltrates somewhat increased from   November 9.    IMPRESSION: Advanced infiltrates somewhat increased from prior.    --- End of Report ---    HANSEL ANDRADE MD; Attending Radiologist  This documenthas been electronically signed. Dec 14 2021  1:49PM    < end of copied text >

## 2021-12-15 NOTE — PROGRESS NOTE ADULT - SUBJECTIVE AND OBJECTIVE BOX
BREA BECKHAM    [facility]    Allergies    codeine (Hives)    Intolerances        PAST MEDICAL & SURGICAL HISTORY:  Dementia of frontal lobe type    Aphasic stroke    Diabetes mellitus    Respiratory failure    Hypertension    GERD (gastroesophageal reflux disease)    Constipation    Respiratory failure    CVA (cerebral vascular accident)    HTN (hypertension)    DM (diabetes mellitus)    Advanced dementia    COVID-19 virus detected    Quadriplegia    Pneumonia    Type II diabetes mellitus    Hx of appendectomy    Gastrostomy in place    Tracheostomy in place    Tracheostomy tube present    Feeding by G-tube        FAMILY HISTORY:  No pertinent family history in first degree relatives        Home Medications:  albuterol 90 mcg/inh inhalation aerosol: 2 puff(s) inhaled every 6 hours (08 Dec 2021 16:51)  Bacid (LAC) oral tablet: 2 tab(s) by gastrostomy tube once a day (08 Dec 2021 16:51)  Carafate 1 g/10 mL oral suspension: 10 milliliter(s) by gastrostomy tube 4 times a day (before meals and at bedtime) for 14 days (Started 6/4/21) (08 Dec 2021 16:51)  chlorhexidine 0.12% mucous membrane liquid: 15 milliliter(s) mucous membrane 2 times a day (08 Dec 2021 16:51)  insulin lispro 100 units/mL injectable solution: injectable 3 times a day ; sliding scale coverage  (14 Dec 2021 10:38)  ipratropium-albuterol 0.5 mg-2.5 mg/3 mL inhalation solution: 3 milliliter(s) inhaled 4 times a day (08 Dec 2021 16:51)  LORazepam 1 mg oral tablet: 1 tab(s) by gastrostomy tube 3 times a day, As Needed (08 Dec 2021 16:51)  methocarbamol 500 mg oral tablet: 1 tab(s) orally 2 times a day (14 Dec 2021 19:18)  pantoprazole 40 mg oral granule, delayed release: 40 milligram(s) by gastrostomy tube 2 times a day (08 Dec 2021 16:51)  piperacillin-tazobactam 3 g-0.375 g/50 mL intravenous solution: intravenous every 8 hours (14 Dec 2021 19:18)  polyethylene glycol 3350 oral powder for reconstitution: 17 gram(s) orally every 12 hours (08 Dec 2021 16:51)  ProAir HFA 90 mcg/inh inhalation aerosol: 2 puff(s) inhaled every 6 hours (08 Dec 2021 16:51)  senna 8.6 mg oral tablet: 3 tab(s) by gastrostomy tube once a day (at bedtime) (08 Dec 2021 16:51)  simethicone 80 mg oral tablet, chewable: 1 tab(s) orally every 6 hours (08 Dec 2021 16:51)  Tylenol 325 mg oral tablet: 2 tab(s) by gastrostomy tube once a day; 60 minutes prior to dressing change  (08 Dec 2021 16:51)      MEDICATIONS  (STANDING):  ALBUTerol    90 MICROgram(s) HFA Inhaler 2 Puff(s) Inhalation every 6 hours  chlorhexidine 0.12% Liquid 15 milliLiter(s) Oral Mucosa every 12 hours  dextrose 40% Gel 15 Gram(s) Oral once  dextrose 5%. 1000 milliLiter(s) (50 mL/Hr) IV Continuous <Continuous>  dextrose 5%. 1000 milliLiter(s) (100 mL/Hr) IV Continuous <Continuous>  dextrose 50% Injectable 12.5 Gram(s) IV Push once  dextrose 50% Injectable 25 Gram(s) IV Push once  dextrose 50% Injectable 25 Gram(s) IV Push once  glucagon  Injectable 1 milliGRAM(s) IntraMuscular once  heparin   Injectable 5000 Unit(s) SubCutaneous every 12 hours  insulin lispro (ADMELOG) corrective regimen sliding scale   SubCutaneous every 6 hours  methocarbamol 500 milliGRAM(s) Oral two times a day  midazolam Infusion 0.02 mG/kG/Hr (0.89 mL/Hr) IV Continuous <Continuous>  pantoprazole  Injectable 40 milliGRAM(s) IV Push every 12 hours  piperacillin/tazobactam IVPB.. 3.375 Gram(s) IV Intermittent every 8 hours  polyethylene glycol 3350 17 Gram(s) Oral every 12 hours  senna 2 Tablet(s) Oral at bedtime  simethicone 80 milliGRAM(s) Chew every 6 hours  sucralfate suspension 1 Gram(s) Enteral Tube every 6 hours    MEDICATIONS  (PRN):  acetaminophen     Tablet .. 650 milliGRAM(s) Oral every 6 hours PRN Temp greater or equal to 38C (100.4F), Mild Pain (1 - 3)      Diet, NPO with Tube Feed:   Tube Feeding Modality: Gastrostomy  Glucerna 1.5 Horacio  Total Volume for 24 Hours (mL): 1000  Continuous  Starting Tube Feed Rate mL per Hour: 20  Increase Tube Feed Rate by (mL): 10     Every 6 hours  Until Goal Tube Feed Rate (mL per Hour): 50  Tube Feed Duration (in Hours): 20  Tube Feed Start Time: 17:00 (12-09-21 @ 16:49) [Active]          Vital Signs Last 24 Hrs  T(C): 37 (15 Dec 2021 04:04), Max: 37.1 (15 Dec 2021 00:04)  T(F): 98.6 (15 Dec 2021 04:04), Max: 98.8 (15 Dec 2021 00:04)  HR: 60 (15 Dec 2021 06:00) (55 - 135)  BP: 104/56 (15 Dec 2021 06:00) (90/53 - 171/59)  BP(mean): 72 (15 Dec 2021 06:00) (70 - 113)  RR: 18 (15 Dec 2021 06:00) (14 - 45)  SpO2: 100% (15 Dec 2021 06:00) (79% - 100%)      12-13-21 @ 07:01  -  12-14-21 @ 07:00  --------------------------------------------------------  IN: 2050 mL / OUT: 850 mL / NET: 1200 mL    12-14-21 @ 07:01  -  12-15-21 @ 06:42  --------------------------------------------------------  IN: 708.5 mL / OUT: 1350 mL / NET: -641.5 mL        Mode: AC/ CMV (Assist Control/ Continuous Mandatory Ventilation), RR (machine): 18, TV (machine): 400, FiO2: 50, PEEP: 5, ITime: 1, MAP: 9, PIP: 33      LABS:                        9.6    17.35 )-----------( 332      ( 14 Dec 2021 13:58 )             32.9     12-14    142  |  109<H>  |  25<H>  ----------------------------<  201<H>  4.9   |  24  |  1.20    Ca    8.9      14 Dec 2021 15:28  Phos  3.3     12-14  Mg     2.0     12-14    TPro  6.7  /  Alb  2.1<L>  /  TBili  0.3  /  DBili  x   /  AST  20  /  ALT  21  /  AlkPhos  132<H>  12-14              WBC:  WBC Count: 17.35 K/uL (12-14 @ 13:58)  WBC Count: 5.60 K/uL (12-13 @ 06:20)  WBC Count: 5.82 K/uL (12-12 @ 06:29)  WBC Count: 6.98 K/uL (12-11 @ 09:39)      MICROBIOLOGY:  RECENT CULTURES:  12-11 .Sputum Sputum Serratia marcescens  Pseudomonas aeruginosa (Carbapenem Resistant)  Stenotrophomonas maltophilia   Moderate polymorphonuclear leukocytes per low power field  Rare Squamous epithelial cells per low power field  Numerous Gram Negative Rods seen per oil power field   Few Pseudomonas aeruginosa (Carbapenem Resistant)  Moderate Serratia marcescens  Moderate Stenotrophomonas maltophilia  Normal Respiratory Elvira present    12-08 Clean Catch Clean Catch (Midstream) XXXX XXXX   <10,000 CFU/mL Normal Urogenital Elvira    12-08 .Blood Blood-Peripheral XXXX XXXX   No Growth Final                    Sodium:  Sodium, Serum: 142 mmol/L (12-14 @ 15:28)  Sodium, Serum: 142 mmol/L (12-13 @ 06:20)  Sodium, Serum: 141 mmol/L (12-12 @ 06:29)  Sodium, Serum: 137 mmol/L (12-11 @ 09:40)      1.20 mg/dL 12-14 @ 15:28  1.03 mg/dL 12-13 @ 06:20  1.13 mg/dL 12-12 @ 06:29  1.22 mg/dL 12-11 @ 09:40      Hemoglobin:  Hemoglobin: 9.6 g/dL (12-14 @ 13:58)  Hemoglobin: 8.9 g/dL (12-13 @ 06:20)  Hemoglobin: 9.1 g/dL (12-12 @ 06:29)  Hemoglobin: 9.4 g/dL (12-11 @ 09:39)      Platelets: Platelet Count - Automated: 332 K/uL (12-14 @ 13:58)  Platelet Count - Automated: 236 K/uL (12-13 @ 06:20)  Platelet Count - Automated: 235 K/uL (12-12 @ 06:29)  Platelet Count - Automated: 224 K/uL (12-11 @ 09:39)      LIVER FUNCTIONS - ( 14 Dec 2021 15:28 )  Alb: 2.1 g/dL / Pro: 6.7 g/dL / ALK PHOS: 132 U/L / ALT: 21 U/L DA / AST: 20 U/L / GGT: x                 RADIOLOGY & ADDITIONAL STUDIES:      MICROBIOLOGY:  RECENT CULTURES:  12-11 .Sputum Sputum Serratia marcescens  Pseudomonas aeruginosa (Carbapenem Resistant)  Stenotrophomonas maltophilia   Moderate polymorphonuclear leukocytes per low power field  Rare Squamous epithelial cells per low power field  Numerous Gram Negative Rods seen per oil power field   Few Pseudomonas aeruginosa (Carbapenem Resistant)  Moderate Serratia marcescens  Moderate Stenotrophomonas maltophilia  Normal Respiratory Elvira present    12-08 Clean Catch Clean Catch (Midstream) XXXX XXXX   <10,000 CFU/mL Normal Urogenital Elvira    12-08 .Blood Blood-Peripheral XXXX XXXX   No Growth Final

## 2021-12-15 NOTE — PROGRESS NOTE ADULT - SUBJECTIVE AND OBJECTIVE BOX
[facility], Division of Infectious Diseases  MOIZ Lora Y. Patel, S. Shah  [phone]  ([phone] - weekdays after 5pm and weekends)    Name: BREA BECKHAM  Age/Gender: 76y Female  MRN: [id number]    Interval History:  Patient seen this morning.   Notes reviewed.   Seizure like activity yesterday prior to discharge  plan to transfer to [place] for continuous EEG    Allergies: codeine (Hives)      Objective:  Vitals:   T(F): 98.3 (12-15-21 @ 08:30), Max: 98.8 (12-15-21 @ 00:04)  HR: 62 (12-15-21 @ 08:00) (55 - 135)  BP: 127/74 (12-15-21 @ 08:00) (90/53 - 171/59)  RR: 27 (12-15-21 @ 08:00) (14 - 45)  SpO2: 100% (12-15-21 @ 08:00) (79% - 100%)  Physical Examination:  General: no acute distress  HEENT: trachesotomy, on vent  Cardio: S1, S2 present, irregularly irregular, normal rate  Resp: clear to auscultation bilaterally anteriorly  Abd: soft, distended  Neuro: aphonia, does not respond to commands  Ext: b/l LE edema, contractures b/l  Skin: warm, dry, no visible rash  Lines:    Laboratory Studies:  CBC:                       9.6    17.35 )-----------( 332      ( 14 Dec 2021 13:58 )             32.9     WBC Trend:  17.35 12-14-21 @ 13:58  5.60 12-13-21 @ 06:20  5.82 12-12-21 @ 06:29  6.98 12-11-21 @ 09:39  13.27 12-10-21 @ 08:38  9.43 12-09-21 @ 20:48  8.82 12-09-21 @ 07:09  6.92 12-08-21 @ 23:10  8.30 12-08-21 @ 20:11  12.18 12-08-21 @ 11:58    CMP: 12-15    143  |  108  |  25<H>  ----------------------------<  158<H>  4.7   |  23  |  1.10    Ca    8.5      15 Dec 2021 07:27  Phos  3.4     12-15  Mg     2.0     12-15    TPro  6.1  /  Alb  2.2<L>  /  TBili  0.5  /  DBili  x   /  AST  21  /  ALT  21  /  AlkPhos  133<H>  12-15      LIVER FUNCTIONS - ( 15 Dec 2021 07:27 )  Alb: 2.2 g/dL / Pro: 6.1 g/dL / ALK PHOS: 133 U/L / ALT: 21 U/L DA / AST: 21 U/L / GGT: x               Microbiology: reviewed     Culture - Sputum (collected 12-11-21 @ 00:41)  Source: .Sputum Sputum  Gram Stain (12-11-21 @ 04:56):    Moderate polymorphonuclear leukocytes per low power field    Rare Squamous epithelial cells per low power field    Numerous Gram Negative Rods seen per oil power field  Final Report (12-12-21 @ 17:47):    Few Pseudomonas aeruginosa (Carbapenem Resistant)    Moderate Serratia marcescens    Moderate Stenotrophomonas maltophilia    Normal Respiratory Elvira present  Organism: Stenotrophomonas maltophilia (12-12-21 @ 18:03)      -  Ceftazidime: R >16      -  Levofloxacin: S <=0.5      -  Trimethoprim/Sulfamethoxazole: S <=0.5/9.5      Method Type: PRATIK  Organism: Pseudomonas aeruginosa (Carbapenem Resistant) (12-12-21 @ 18:03)      -  Amikacin: S <=16      -  Aztreonam: S <=4      -  Cefepime: S 4      -  Ceftazidime: S 4      -  Ceftazidime/Avibactam: S      -  Ceftolozane/tazobactam: S      -  Ciprofloxacin: S <=0.25      -  Gentamicin: S <=2      -  Imipenem: R >8      -  Levofloxacin: S <=0.5      -  Meropenem: I 4      -  Piperacillin/Tazobactam: S <=8      -  Tobramycin: S <=2      Method Type: PRATIK  Organism: Serratia marcescens (12-12-21 @ 18:03)      -  Amikacin: S <=16      -  Amoxicillin/Clavulanic Acid: R >16/8      -  Ampicillin: R >16 These ampicillin results predict results for amoxicillin      -  Ampicillin/Sulbactam: R >16/8 Enterobacter, Klebsiella aerogenes, Citrobacter, and Serratia may develop resistance during prolonged therapy (3-4 days)      -  Aztreonam: S <=4      -  Cefazolin: R >16 Enterobacter, Klebsiella aerogenes, Citrobacter, and Serratia may develop resistance during prolonged therapy (3-4 days)      -  Cefepime: S <=2      -  Cefoxitin: R <=8      -  Ceftriaxone: I 2 Enterobacter, Klebsiella aerogenes, Citrobacter, and Serratia may develop resistance during prolonged therapy      -  Ciprofloxacin: R >2      -  Ertapenem: S <=0.5      -  Gentamicin: S <=2      -  Levofloxacin: R 2      -  Meropenem: S <=1      -  Piperacillin/Tazobactam: S <=8      -  Tobramycin: S <=2      -  Trimethoprim/Sulfamethoxazole: S <=0.5/9.5      Method Type: PRATIK  Organism: Serratia marcescens  Pseudomonas aeruginosa (Carbapenem Resistant)  Stenotrophomonas maltophilia (12-12-21 @ 17:47)    Culture - Urine (collected 12-08-21 @ 17:55)  Source: Clean Catch Clean Catch (Midstream)  Final Report (12-09-21 @ 13:58):    <10,000 CFU/mL Normal Urogenital Elvira    Culture - Blood (collected 12-08-21 @ 15:24)  Source: .Blood Blood-Peripheral  Final Report (12-13-21 @ 16:01):    No Growth Final    Culture - Blood (collected 12-08-21 @ 15:24)  Source: .Blood Blood-Peripheral  Final Report (12-13-21 @ 16:01):    No Growth Final      Radiology: reviewed     Medications:  acetaminophen     Tablet .. 650 milliGRAM(s) Oral every 6 hours PRN  ALBUTerol    90 MICROgram(s) HFA Inhaler 2 Puff(s) Inhalation every 6 hours  chlorhexidine 0.12% Liquid 15 milliLiter(s) Oral Mucosa every 12 hours  dextrose 40% Gel 15 Gram(s) Oral once  dextrose 5%. 1000 milliLiter(s) IV Continuous <Continuous>  dextrose 5%. 1000 milliLiter(s) IV Continuous <Continuous>  dextrose 50% Injectable 12.5 Gram(s) IV Push once  dextrose 50% Injectable 25 Gram(s) IV Push once  dextrose 50% Injectable 25 Gram(s) IV Push once  glucagon  Injectable 1 milliGRAM(s) IntraMuscular once  heparin   Injectable 5000 Unit(s) SubCutaneous every 12 hours  insulin lispro (ADMELOG) corrective regimen sliding scale   SubCutaneous every 6 hours  methocarbamol 500 milliGRAM(s) Oral two times a day  midazolam Infusion 0.02 mG/kG/Hr IV Continuous <Continuous>  pantoprazole  Injectable 40 milliGRAM(s) IV Push every 12 hours  piperacillin/tazobactam IVPB.. 3.375 Gram(s) IV Intermittent every 8 hours  polyethylene glycol 3350 17 Gram(s) Oral every 12 hours  senna 2 Tablet(s) Oral at bedtime  simethicone 80 milliGRAM(s) Chew every 6 hours  sucralfate suspension 1 Gram(s) Enteral Tube every 6 hours    Antimicrobials:  piperacillin/tazobactam IVPB.. 3.375 Gram(s) IV Intermittent every 8 hours

## 2021-12-15 NOTE — PROGRESS NOTE ADULT - ASSESSMENT
CHAPINCITO GUIDRY 75 f [facility] S 12/8/2021   DR ILIANA KEMP     REVIEW OF SYMPTOMS      Able to give (reliable) ROS  NO     PHYSICAL EXAM    HEENT Unremarkable  atraumatic   RESP Fair air entry EXP prolonged    Harsh breath sound Resp distres mild   CARDIAC S1 S2 No S3     NO JVD    ABDOMEN SOFT BS PRESENT NOT DISTENDED No hepatosplenomegaly   PEDAL EDEMA present No calf tenderness  NO rash       ________________  Age DOA CC.  76 year old female with a history of Pneumonia  HTN, DM, CVA, dementia, chronic respiratory failure s/p trach, PEG, cardiac arrest who presented 12/8/2021 from NH with abnormal labs. She was found to have a hemoglobin of 6. No further history could be obtained from patient.  Pulm crit care consulted 12/8/2021     _____                            GOC.12/8/2021 Full code   COVID STATUS. 12/8/2021 scv2 (-)   ICU STAY. Admitted ICU 12/8/2021   ABIO.   12/13/2021 Levaquin 750 1 dose Dr Medellin  (12/11 sp stenotrophomonas)   12/14 zosyn x 7d Dr Tay     PATIENT DATA   VITALS/PO/IO/VENT/DRIPS.   12/15/2021 afeb 60 110/50   12/15/2021 4p MIDA .03 m/k/h   12/15/2021 u 1350   12/15/2021 ac 18/400/5/.5       BEST PRACTICE ISSUES.                                                  HEAD OF BED ELEVATION. Yes  DV[facility]LAXIS.      [facility]   12/8/2021 No pharmac pplx as pt possibly has abla on admission 12/8/2021                                    SQUIRES PROPHYLAXIS.      12/8/2021 IV protonix 40.2       12/9/2021 carafate 1.4                                                  DIET.            12/9/2021 glucerna 1.5 1000 12/9 12/8/2021 npo till gi bleed ruled out              INFECTION PROPHYLAXIS.    12/8/2021 chlorhexidine 0.12% bid     GENERAL ISSUES                                       ALLERGY.         codeine                   WEIGHT.           12/8/2021 61                           BMI.                   12/8/2021 22  SPEECH SWALLOW RECOMMENDATIONS.   IV FLUIDS.      ASSESSMENT/RECOMMENDATIONS.    RESP.   Trach  SP Trach pmh     VENT DYSSYNCHRONY NOTED 12/14/2021 .  12/14/2021 Fentanyl    12/14/2021 Versed          COPD pmh.   12/9/2021 albuterol hf.4   12/9/2021 Spiriva   Cont Rx      VENT MANAGEMENT.  VENT DEPENDENT RESP FAILURE pmh.  Clinically stable on current settings   12/10 dw  He wants us to try weaning    HEMODYNAMICS.   HYPOTENSION poa.   9/8/2021 ECHO n lvsf mild dd pasp 51   BP has improvd   target map 65 (+)     INFECTION.   Pneumonia  poa 12/8/2021   w 12/9-12/11-12/14/2021 w 8.8 - 6.9-17   12/9 pr 6.7   CXR 12/14/2021 bl opac poss effsns maddie l side   CXR 12/8 widespread airspace dis sl improvd cw 11/9/2021 12/8 ct cap distal airway impaction and mf pneum related to aspirat   Changes are either new or unchanged   Volume loss rll has improvd since 11/7/2021   Small l effsn     12/8 mrsa (-)   12/8 blod c (-)  12/8 urine c (-)     12/11 sputum stenotrophomonas levaq sens  12/11 sputum CRP Pseudomonas  12/11 sputu serratia   12/13/2021 Levaquin 750 1 dose Dr Medellin  12/14 zosyn x 7d Dr Tay    SEVERE ANEMIA poa   Hb 12/8-12/8-12/9-12/11-12/12-12/13-12/14/2021   Hb 5.2 -9.7-8.6 - 9.4-9.1-8.9 -9.6  Hb stable       GERD pmh.  On ppi     sp PEG pmh    GI BLEED poa 12/8 12/9/2021 SOB (+)   12/9/2021 Seen by Dr Nieves No intervention pplannd   12/8/2021 IV protonix 40.2       12/9/2021 carafate 1.4                12/9/2021 Dr BETH vinesed d[facility]   12/11/2021 gi not planning any pproced    SEIZURES SPASMS pmh   Takes lorazepam 1.3   12/14/2021 plan is to send to [facility] for eeg monitoring  12/14 METHOCARBAMOL 500.2   12/14 MIDAZOLAM 0.02 m/k/h       OVERALL ISSUES  76 full code Anoxic encephalopathy vdrf peg trach patient age   VENT DYSSYNCHRONY Prolonged episode 12/14/2021   ANEMIA Monitor Hb   PNEUM zosyn (crp) One dose levaq 12/13 (steno)  HYPONA Monitor   SEIZURES 12/14/2021   POOR IV ACCESS 12/10/2021 For midline       TIME SPENT   Over 36 minutes aggregate critical care time spent on encounter; activities included   direct patient care, counseling and/or coordinating care reviewing notes, lab data/ imaging , discussion with multidisciplinary team/ patient  /family and explaining in detail risks, benefits, alternatives  of the recommendations     CHAIPNCITO GUIDRY 75 f [facility] S 12/8/2021   DR ILIANA KEMP

## 2021-12-15 NOTE — PROGRESS NOTE ADULT - SUBJECTIVE AND OBJECTIVE BOX
Patient is a 76y Female whom presented to the hospital with ckd and manasa     PAST MEDICAL & SURGICAL HISTORY:  Dementia of frontal lobe type    Aphasic stroke    Diabetes mellitus    Respiratory failure    Hypertension    GERD (gastroesophageal reflux disease)    Constipation    Respiratory failure    CVA (cerebral vascular accident)    HTN (hypertension)    DM (diabetes mellitus)    Advanced dementia    COVID-19 virus detected    Quadriplegia    Pneumonia    Type II diabetes mellitus    Hx of appendectomy    Gastrostomy in place    Tracheostomy in place    Tracheostomy tube present    Feeding by G-tube        MEDICATIONS  (STANDING):  ALBUTerol    90 MICROgram(s) HFA Inhaler 2 Puff(s) Inhalation every 6 hours  chlorhexidine 0.12% Liquid 15 milliLiter(s) Oral Mucosa every 12 hours  dextrose 40% Gel 15 Gram(s) Oral once  dextrose 5%. 1000 milliLiter(s) (50 mL/Hr) IV Continuous <Continuous>  dextrose 5%. 1000 milliLiter(s) (100 mL/Hr) IV Continuous <Continuous>  dextrose 50% Injectable 25 Gram(s) IV Push once  dextrose 50% Injectable 12.5 Gram(s) IV Push once  dextrose 50% Injectable 25 Gram(s) IV Push once  glucagon  Injectable 1 milliGRAM(s) IntraMuscular once  insulin lispro (ADMELOG) corrective regimen sliding scale   SubCutaneous every 6 hours  lactated ringers. 1000 milliLiter(s) (100 mL/Hr) IV Continuous <Continuous>  methocarbamol 250 milliGRAM(s) Oral two times a day  pantoprazole  Injectable 40 milliGRAM(s) IV Push every 12 hours  piperacillin/tazobactam IVPB.. 3.375 Gram(s) IV Intermittent every 8 hours  polyethylene glycol 3350 17 Gram(s) Oral every 12 hours  senna 2 Tablet(s) Oral at bedtime  simethicone 80 milliGRAM(s) Chew every 6 hours  sucralfate 1 Gram(s) Oral every 6 hours  tiotropium 18 MICROgram(s) Capsule 1 Capsule(s) Inhalation daily  vancomycin  IVPB 750 milliGRAM(s) IV Intermittent every 12 hours      Allergies    codeine (Hives)    Intolerances        SOCIAL HISTORY:  Denies ETOh,Smoking,     FAMILY HISTORY:  No pertinent family history in first degree relatives        REVIEW OF SYSTEMS:    unable to obtained a good review system                                                                                                                    9.6    17.35 )-----------( 332      ( 14 Dec 2021 13:58 )             32.9       CBC Full  -  ( 14 Dec 2021 13:58 )  WBC Count : 17.35 K/uL  RBC Count : 3.77 M/uL  Hemoglobin : 9.6 g/dL  Hematocrit : 32.9 %  Platelet Count - Automated : 332 K/uL  Mean Cell Volume : 87.3 fl  Mean Cell Hemoglobin : 25.5 pg  Mean Cell Hemoglobin Concentration : 29.2 gm/dL  Auto Neutrophil # : 16.02 K/uL  Auto Lymphocyte # : 0.37 K/uL  Auto Monocyte # : 0.82 K/uL  Auto Eosinophil # : 0.01 K/uL  Auto Basophil # : 0.04 K/uL  Auto Neutrophil % : 92.4 %  Auto Lymphocyte % : 2.1 %  Auto Monocyte % : 4.7 %  Auto Eosinophil % : 0.1 %  Auto Basophil % : 0.2 %      12-15    143  |  108  |  25<H>  ----------------------------<  158<H>  4.7   |  23  |  1.10    Ca    8.5      15 Dec 2021 07:27  Phos  3.4     12-15  Mg     2.0     12-15    TPro  6.1  /  Alb  2.2<L>  /  TBili  0.5  /  DBili  x   /  AST  21  /  ALT  21  /  AlkPhos  133<H>  12-15      CAPILLARY BLOOD GLUCOSE      POCT Blood Glucose.: 171 mg/dL (15 Dec 2021 23:29)  POCT Blood Glucose.: 156 mg/dL (15 Dec 2021 17:58)  POCT Blood Glucose.: 146 mg/dL (15 Dec 2021 11:40)  POCT Blood Glucose.: 182 mg/dL (15 Dec 2021 06:18)      Vital Signs Last 24 Hrs  T(C): 36.8 (15 Dec 2021 19:29), Max: 37.1 (15 Dec 2021 00:04)  T(F): 98.2 (15 Dec 2021 19:29), Max: 98.8 (15 Dec 2021 00:04)  HR: 65 (15 Dec 2021 22:00) (55 - 105)  BP: 108/66 (15 Dec 2021 22:00) (100/58 - 171/59)  BP(mean): 80 (15 Dec 2021 22:00) (69 - 113)  RR: 19 (15 Dec 2021 22:00) (17 - 37)  SpO2: 95% (15 Dec 2021 22:00) (95% - 100%)                 PHYSICAL EXAM:    Constitutional: NAD  HEENT: conjunctive   clear   Neck:  No JVD  Respiratory: pos decrease bs pos trach  Cardiovascular: S1 and S2  Gastrointestinal: BS+, soft, pos peg   Extremities: No peripheral edema

## 2021-12-15 NOTE — CHART NOTE - NSCHARTNOTEFT_GEN_A_CORE
Assessment:     Pt seen for follow-up. Pt is a "75F with chronic resp failure - vent dependent, hx of subarachnoid hemorrhage, hx of cardiac arrest, dementia s/p PEG, HTN, DM2 on insulin, hx of CVA, GERD, COPD, admission here from 9/25 to 10/4 and 11/7-11/11 for respiratory failure/sepsis possibly 2/2 aspiration vs vent associated PNA who presents from [facility] for abnormal labs."  In the ED pt was found to be anemic, RYAN and Hyponatremic, pt was given 2U of PRBC and h/h improved.  Also admitted with possible PNA.     Pt admitted from [facility]; per transfer documents, pt receiving EN feeds via PEG of Glucerna 1.5 @ 50 ml/hr x 20hr for a total volume of 1000 ml/day, providing pt w/ 1500kcal and 82.5g protein per day (+addtl 300ml before and after, +300ml at 2am= 900ml fluid/day in addition to tube feeding hourly flush). Pt previously NPO secondary to concern of GIB. Per GI note, no active bleeding, IV PPI BID. Tube feeding started 10/9, pt currently receiving Glucerna 1.5 at goal rate of 50ml/hr x 20hrs, which is providing pt 1500kcal (based on 28 kcal/kg transfer wt) and 82.5 g protein (based on 1.5g/kg bw). Pt tolerating tube feeding well. Receiving free water flushes 25 ml/hr. +BM 12/11 per flowsheet. Pt also receiving IVF; NaCl @ 75 ml/hr.  Per previous RD note, "pt's adm wt 97#, per comprehensive chart review, pt admitted 1x month ago and adm wt 124#/transfer wt 120#; per transfer documents from [facility], pt currently weighs 117.2# (appears accurate compared to current adm wt), suspect pt did not have 25# wt loss x 1 month". RD will continue to follow-up and monitor pt's nutrition status.    Factors impacting intake: [ ] none [ ] nausea  [ ] vomiting [ ] diarrhea [ ] constipation  [ ]chewing problems [ ] swallowing issues  [X] other: peg    Diet Prescription: Diet, NPO with Tube Feed:   Tube Feeding Modality: Gastrostomy  Glucerna 1.5 Horacio  Total Volume for 24 Hours (mL): 1000  Continuous  Starting Tube Feed Rate {mL per Hour}: 20  Increase Tube Feed Rate by (mL): 10     Every 6 hours  Until Goal Tube Feed Rate (mL per Hour): 50  Tube Feed Duration (in Hours): 20  Tube Feed Start Time: 17:00 (12-09-21 @ 16:49)    Intake: tolerating TF at goal rate of 50ml/hr    Current Weight: Weight (kg): 44.3 (12-08 @ 22:15)  % Weight Change  Bed scale not working    Pertinent Medications: MEDICATIONS  (STANDING):  ALBUTerol    90 MICROgram(s) HFA Inhaler 2 Puff(s) Inhalation every 6 hours  chlorhexidine 0.12% Liquid 15 milliLiter(s) Oral Mucosa every 12 hours  dextrose 40% Gel 15 Gram(s) Oral once  dextrose 5%. 1000 milliLiter(s) (50 mL/Hr) IV Continuous <Continuous>  dextrose 5%. 1000 milliLiter(s) (100 mL/Hr) IV Continuous <Continuous>  dextrose 50% Injectable 25 Gram(s) IV Push once  dextrose 50% Injectable 12.5 Gram(s) IV Push once  dextrose 50% Injectable 25 Gram(s) IV Push once  glucagon  Injectable 1 milliGRAM(s) IntraMuscular once  heparin   Injectable 5000 Unit(s) SubCutaneous every 12 hours  insulin lispro (ADMELOG) corrective regimen sliding scale   SubCutaneous every 6 hours  methocarbamol 250 milliGRAM(s) Oral two times a day  midodrine 5 milliGRAM(s) Oral every 8 hours  pantoprazole  Injectable 40 milliGRAM(s) IV Push every 12 hours  piperacillin/tazobactam IVPB.. 3.375 Gram(s) IV Intermittent every 8 hours  polyethylene glycol 3350 17 Gram(s) Oral every 12 hours  senna 2 Tablet(s) Oral at bedtime  simethicone 80 milliGRAM(s) Chew every 6 hours  sodium chloride 0.9%. 1000 milliLiter(s) (65 mL/Hr) IV Continuous <Continuous>  sucralfate suspension 1 Gram(s) Enteral Tube every 6 hours    MEDICATIONS  (PRN):  acetaminophen     Tablet .. 650 milliGRAM(s) Oral every 6 hours PRN Temp greater or equal to 38C (100.4F), Mild Pain (1 - 3)  LORazepam   Injectable 1 milliGRAM(s) IV Push every 4 hours PRN Agitation    Pertinent Labs: 12-12 Na141 mmol/L Glu 155 mg/dL<H> K+ 4.1 mmol/L Cr  1.13 mg/dL BUN 35 mg/dL<H> 12-12 Phos 2.2 mg/dL<L> 12-11 Alb 2.0 g/dL<L>    CAPILLARY BLOOD GLUCOSE:  POCT Blood Glucose.: 157 mg/dL (12 Dec 2021 06:38)  POCT Blood Glucose.: 183 mg/dL (11 Dec 2021 23:57)  POCT Blood Glucose.: 204 mg/dL (11 Dec 2021 17:13)  POCT Blood Glucose.: 159 mg/dL (11 Dec 2021 11:32)    Skin: wounds; L/R great toe, pressure ulcers; stage I to BL heels    Estimated Needs:   [X] no change since previous assessment  [ ] recalculated:     Previous Nutrition Diagnosis:   [ ] Inadequate Energy Intake [ ]Inadequate Oral Intake [ ] Excessive Energy Intake   [ ] Underweight [X] Increased Nutrient Needs [ ] Overweight/Obesity   [ ] Altered GI Function [ ] Unintended Weight Loss [ ] Food & Nutrition Related Knowledge Deficit [ ] Malnutrition     Nutrition Diagnosis is [X] ongoing  [ ] resolved [ ] not applicable     New Nutrition Diagnosis: [X] not applicable     Interventions: Continue nutrition care plan, Glucerna 1.5 at goal rate of 50ml/hr  Recommend  [ ] Change Diet To:  [ ] Nutrition Supplement  [ ] Nutrition Support  [ ] Other:     Monitoring and Evaluation:   [ ] PO intake [ x ] Tolerance to diet prescription [ x ] weights [ x ] labs[ x ] follow up per protocol  [ ] other:
Assessment: Pt seen for follow-up. Pt is a "75F with chronic resp failure - vent dependent, hx of subarachnoid hemorrhage, hx of cardiac arrest, dementia s/p PEG, HTN, DM2 on insulin, hx of CVA, GERD, COPD, admission here from 9/25 to 10/4 and 11/7-11/11 for respiratory failure/sepsis possibly 2/2 aspiration vs vent associated PNA who presents from [facility] for abnormal labs."  In the ED pt was found to be anemic, RYAN and Hyponatremic, pt was given 2U of PRBC and h/h improved.  Also admitted with possible PNA.     Pt admitted from [facility] NH; per transfer documents, pt receiving EN feeds via PEG of Glucerna 1.5 @ 50 ml/hr x 20hr for a total volume of 1000 ml/day, providing pt w/ 1500kcal and 82.5g protein per day (+addtl 300ml before and after, +300ml at 2am= 900ml fluid/day in addition to tube feeding hourly flush). Pt previously NPO secondary to concern of GIB. Per GI note, no active bleeding, IV PPI BID.  PEG tube found dislodged 12/13 and replaced by GI.  Tube feeding restarted and pt currently receiving Glucerna 1.5 at goal rate of 50ml/hr x 20hrs, which is providing pt 1500kcal (based on 28 kcal/kg transfer wt) and 82.5 g protein (based on 1.5g/kg bw); tolerated well per nursing.  Pt s/p RRT yesterday 12/14 secondary to seizure like activity.  Neuro recommending EEG at [facility], was pending transfer - now on hold pending bed availability.  RD will continue to follow-up and monitor pt's nutrition status.    Factors impacting intake: [ ] none [ ] nausea  [ ] vomiting [ ] diarrhea [ ] constipation  [ ]chewing problems [ ] swallowing issues  [ ] other:     Diet Prescription: Diet, NPO with Tube Feed:   Tube Feeding Modality: Gastrostomy  Glucerna 1.5 Horacio  Total Volume for 24 Hours (mL): 1000  Continuous  Starting Tube Feed Rate {mL per Hour}: 20  Increase Tube Feed Rate by (mL): 10     Every 6 hours  Until Goal Tube Feed Rate (mL per Hour): 50  Tube Feed Duration (in Hours): 20  Tube Feed Start Time: 17:00 (12-09-21 @ 16:49)    Intake:  tolerating TF at goal rate of 50ml/hr    Current Weight: Weight (kg): 44.3 (12-08 @ 22:15)  % Weight Change    Pertinent Medications: MEDICATIONS  (STANDING):  ALBUTerol    90 MICROgram(s) HFA Inhaler 2 Puff(s) Inhalation every 6 hours  chlorhexidine 0.12% Liquid 15 milliLiter(s) Oral Mucosa every 12 hours  dextrose 40% Gel 15 Gram(s) Oral once  dextrose 5%. 1000 milliLiter(s) (50 mL/Hr) IV Continuous <Continuous>  dextrose 5%. 1000 milliLiter(s) (100 mL/Hr) IV Continuous <Continuous>  dextrose 50% Injectable 12.5 Gram(s) IV Push once  dextrose 50% Injectable 25 Gram(s) IV Push once  dextrose 50% Injectable 25 Gram(s) IV Push once  glucagon  Injectable 1 milliGRAM(s) IntraMuscular once  heparin   Injectable 5000 Unit(s) SubCutaneous every 12 hours  insulin lispro (ADMELOG) corrective regimen sliding scale   SubCutaneous every 6 hours  methocarbamol 500 milliGRAM(s) Oral two times a day  midazolam Infusion 0.02 mG/kG/Hr (0.89 mL/Hr) IV Continuous <Continuous>  pantoprazole  Injectable 40 milliGRAM(s) IV Push every 12 hours  piperacillin/tazobactam IVPB.. 3.375 Gram(s) IV Intermittent every 8 hours  polyethylene glycol 3350 17 Gram(s) Oral every 12 hours  senna 2 Tablet(s) Oral at bedtime  simethicone 80 milliGRAM(s) Chew every 6 hours  sucralfate suspension 1 Gram(s) Enteral Tube every 6 hours    MEDICATIONS  (PRN):  acetaminophen     Tablet .. 650 milliGRAM(s) Oral every 6 hours PRN Temp greater or equal to 38C (100.4F), Mild Pain (1 - 3)    Pertinent Labs: 12-15 Na143 mmol/L Glu 158 mg/dL<H> K+ 4.7 mmol/L Cr  1.10 mg/dL BUN 25 mg/dL<H> 12-15 Phos 3.4 mg/dL 12-15 Alb 2.2 g/dL<L>     CAPILLARY BLOOD GLUCOSE      POCT Blood Glucose.: 182 mg/dL (15 Dec 2021 06:18)  POCT Blood Glucose.: 114 mg/dL (14 Dec 2021 23:28)  POCT Blood Glucose.: 109 mg/dL (14 Dec 2021 19:21)  POCT Blood Glucose.: 199 mg/dL (14 Dec 2021 12:03)    Skin: wounds; L/R great toe, pressure ulcers; stage I to BL heels    Estimated Needs:   [xx ] no change since previous assessment  [ ] recalculated:     Previous Nutrition Diagnosis:   [ ] Inadequate Energy Intake [ ]Inadequate Oral Intake [ ] Excessive Energy Intake   [ ] Underweight [x ] Increased Nutrient Needs [ ] Overweight/Obesity   [ ] Altered GI Function [ ] Unintended Weight Loss [ ] Food & Nutrition Related Knowledge Deficit [ ] Malnutrition     Nutrition Diagnosis is [ x] ongoing  [ ] resolved [ ] not applicable     New Nutrition Diagnosis: [ ] not applicable       Interventions: Continue nutrition care plan, Glucerna 1.5 at goal rate of 50ml/hr x 20hrs  Recommend  [ ] Change Diet To:  [ ] Nutrition Supplement  [ ] Nutrition Support  [ ] Other:     Monitoring and Evaluation:   [ ] PO intake [ x ] Tolerance to EN prescription [ x ] weights [ x ] labs[ x ] follow up per protocol  [ ] other:
Pt stable for discharge. Please see discharge note for additional information regarding the hospital course and the day of discharge.   Signout given to MD at [facility] Dr. Paul Merrill
Discussed with GI, no plan for scope. Will check repeat H/H and start DVT ppx heparin if HGB stable. Nutrition consulted for tube feed reccs

## 2021-12-16 LAB
GLUCOSE BLDC GLUCOMTR-MCNC: 155 MG/DL — HIGH (ref 70–99)
GLUCOSE BLDC GLUCOMTR-MCNC: 158 MG/DL — HIGH (ref 70–99)
GLUCOSE BLDC GLUCOMTR-MCNC: 168 MG/DL — HIGH (ref 70–99)
GLUCOSE BLDC GLUCOMTR-MCNC: 182 MG/DL — HIGH (ref 70–99)

## 2021-12-16 PROCEDURE — 99233 SBSQ HOSP IP/OBS HIGH 50: CPT

## 2021-12-16 PROCEDURE — 93970 EXTREMITY STUDY: CPT | Mod: 26

## 2021-12-16 RX ADMIN — SIMETHICONE 80 MILLIGRAM(S): 80 TABLET, CHEWABLE ORAL at 17:04

## 2021-12-16 RX ADMIN — PANTOPRAZOLE SODIUM 40 MILLIGRAM(S): 20 TABLET, DELAYED RELEASE ORAL at 05:46

## 2021-12-16 RX ADMIN — SENNA PLUS 2 TABLET(S): 8.6 TABLET ORAL at 21:23

## 2021-12-16 RX ADMIN — Medication 1 MILLIGRAM(S): at 13:58

## 2021-12-16 RX ADMIN — ALBUTEROL 2 PUFF(S): 90 AEROSOL, METERED ORAL at 13:15

## 2021-12-16 RX ADMIN — Medication 1 GRAM(S): at 17:04

## 2021-12-16 RX ADMIN — ALBUTEROL 2 PUFF(S): 90 AEROSOL, METERED ORAL at 07:48

## 2021-12-16 RX ADMIN — PANTOPRAZOLE SODIUM 40 MILLIGRAM(S): 20 TABLET, DELAYED RELEASE ORAL at 17:04

## 2021-12-16 RX ADMIN — Medication 1 GRAM(S): at 11:52

## 2021-12-16 RX ADMIN — HEPARIN SODIUM 5000 UNIT(S): 5000 INJECTION INTRAVENOUS; SUBCUTANEOUS at 17:04

## 2021-12-16 RX ADMIN — ALBUTEROL 2 PUFF(S): 90 AEROSOL, METERED ORAL at 20:36

## 2021-12-16 RX ADMIN — ALBUTEROL 2 PUFF(S): 90 AEROSOL, METERED ORAL at 00:41

## 2021-12-16 RX ADMIN — PIPERACILLIN AND TAZOBACTAM 25 GRAM(S): 4; .5 INJECTION, POWDER, LYOPHILIZED, FOR SOLUTION INTRAVENOUS at 05:47

## 2021-12-16 RX ADMIN — PIPERACILLIN AND TAZOBACTAM 25 GRAM(S): 4; .5 INJECTION, POWDER, LYOPHILIZED, FOR SOLUTION INTRAVENOUS at 21:23

## 2021-12-16 RX ADMIN — MIDAZOLAM HYDROCHLORIDE 2 MILLIGRAM(S): 1 INJECTION, SOLUTION INTRAMUSCULAR; INTRAVENOUS at 06:02

## 2021-12-16 RX ADMIN — SIMETHICONE 80 MILLIGRAM(S): 80 TABLET, CHEWABLE ORAL at 05:47

## 2021-12-16 RX ADMIN — METHOCARBAMOL 500 MILLIGRAM(S): 500 TABLET, FILM COATED ORAL at 17:04

## 2021-12-16 RX ADMIN — CHLORHEXIDINE GLUCONATE 15 MILLILITER(S): 213 SOLUTION TOPICAL at 05:48

## 2021-12-16 RX ADMIN — Medication 2: at 06:02

## 2021-12-16 RX ADMIN — PIPERACILLIN AND TAZOBACTAM 25 GRAM(S): 4; .5 INJECTION, POWDER, LYOPHILIZED, FOR SOLUTION INTRAVENOUS at 13:45

## 2021-12-16 RX ADMIN — Medication 1 MILLIGRAM(S): at 21:23

## 2021-12-16 RX ADMIN — HEPARIN SODIUM 5000 UNIT(S): 5000 INJECTION INTRAVENOUS; SUBCUTANEOUS at 05:47

## 2021-12-16 RX ADMIN — METHOCARBAMOL 500 MILLIGRAM(S): 500 TABLET, FILM COATED ORAL at 05:47

## 2021-12-16 RX ADMIN — Medication 1 MILLIGRAM(S): at 17:03

## 2021-12-16 RX ADMIN — POLYETHYLENE GLYCOL 3350 17 GRAM(S): 17 POWDER, FOR SOLUTION ORAL at 17:04

## 2021-12-16 RX ADMIN — CHLORHEXIDINE GLUCONATE 15 MILLILITER(S): 213 SOLUTION TOPICAL at 17:03

## 2021-12-16 RX ADMIN — Medication 1 GRAM(S): at 05:47

## 2021-12-16 RX ADMIN — Medication 2: at 17:05

## 2021-12-16 RX ADMIN — SIMETHICONE 80 MILLIGRAM(S): 80 TABLET, CHEWABLE ORAL at 11:52

## 2021-12-16 RX ADMIN — Medication 2: at 11:52

## 2021-12-16 NOTE — PROGRESS NOTE ADULT - ASSESSMENT
75F with chronic resp failure - vent dependent, hx of subarachnoid hemorrhage, hx of cardiac arrest, dementia s/p PEG, HTN, DM2 on insulin, hx of CVA, GERD, COPD, admission here from 9/25 to 10/4 and 11/7-11/11 for respiratory failure/sepsis possibly 2/2 aspiration vs vent associated PNA who presents from [facility] for abnormal labs.  Patient does not contribute to history. In the ED, patient's initial triage vitals were BP 88/37, HR 81, RR 18, 100% on vent and T 99 F.  Labs showed leukocytosis of 12.18, HGB 5.2, BUN/Cr 89/2.00. CXR: Tracheostomy cannula reidentified in position. No change heart mediastinum. Widespread bilateral airspace disease slightly improved.  correlate for pulmonary edema and/or infection. No significant pleural effusion. No pneumothorax. Patient's hand projects over portion of the right base. CT chest and A/P pending  (08 Dec 2021 15:50)      ACUTE RENAL FAILURE: sodium chloride 0.9%. 1000 milliLiter(s) (65 mL/Hr  Serum creatinine is improving    There is no progression . No uremic symptoms  No evidence of anemia .  Fluid status stable.  Will continue to avoid nephrotoxic drugs.  Patient remains asymptomatic.   Continue current therapy.    f/u blood and urine cx,serial lactate levels,monitor vitals roddy raymundo hydration,monitor urine output and renal profile,    hypotension midodrine 5 milliGRAM(s) Oral every 8 hours

## 2021-12-16 NOTE — PROGRESS NOTE ADULT - ASSESSMENT
75F with chronic resp failure - vent dependent, hx of subarachnoid hemorrhage, hx of cardiac arrest, dementia s/p PEG, HTN, DM2 on insulin, hx of CVA, GERD, COPD, admission here from 9/25 to 10/4 and 11/7-11/11 for respiratory failure/sepsis possibly 2/2 aspiration vs vent associated PNA who presents from [facility] for abnormal labs.    #Seizures  Patient had episodes of facial flushing, limb contraction and eye rolling for 1.5 hours  RRT was called. Was given Ativan, Versed and Fentanyl for seizures  Stat labs, CXR ordered which shows increased white count, ? new pna vs seizures  Started on Zosyn again, blood and sputum cultures ordered  Neurology recommended cont EEG, planned for transfer to [place]    As patient was being transferred from hospital vent to ambulance vent, patient required full vent support, started on Propofol and BP dropped to 80's.   Planned for trial of Versed and transfer with critical care ambulance if stable      #Anemia   HGB improving   Has hx of anemia on previous hospitalizations  Occult positive    GI consulted Dr. Nieves, no plans for scope  S/p 2 units PRBC on admission   Alternate opinion from GI Dr. Dupree appreciated. No plan for endoscopy   Hematology input appreciated: would check CBC weekly if possible and arrange for ambulatory transfusions as needed.  Expect patient will remain transfusion dependent.  Would not treat with ESAs at present     # RYAN   stable  Baseline creatinine .9  Avoid nephrotoxic meds  repeat BMP in AM  Strict I&O's  Nephrology following     #PEG tube malfunction  GI following  Woody in place in interim   PEG tube replaced inpatient on 12/13    #Hyponatremia  improved  trend bmp     # aspiration PNA  - Sepsis poa  - On Zosyn, per ID: covering pseudomonas and serratia but not stenotrophomonas therefore likely colonization and not infection & therefore will not adjust therapy to cover this (also would involve large doses of bactrim & pt w/ CKD)  s/p levofloxacin 750mg IV x 1 on 12/13  - CXR on 12/14 with increasing infiltrates, s/p Lasix 40mg x1, restarted on abx   - ID following  - New blood cultures sent on 12/14    #COPD:  - c/w albuterol  - pulm (Jaime), recs appreciated    #GERD:  - c/w carafate and PPI     # VTE ppx:  - Heparin for DVT ppx    75F Chronic Respiratory Failure, Dementia, HTN, DM2, COPD, admitted for Acute Encephalopathy and Aspiration PNA    Aspiration PNA / Chronic Respiratory Failure / COPD - Chronic Respiratory Failure and Vent Dependant from history of SAH and Cardiac Arrest. Sepsis POA and on IV Zosyn.  Persistent leukocytosis. Albuterol PRN Pulmonary and Infectious Disease following.       Abnormal Body Movements - EEG was planned to rule out epileptic activity and pending due to availability at transfer facility. Currently on Ativan and weaning off Versed. Spouse feels this is due to GI and Oxygenation related issues but still wants EEG.  Neurology following.     Anemia - Possible GI Bleed. GI consulted with no plans for scope.  Will monitor and transfuse as needed.     CKD 3 - Baseline Creatinine 1.0. Avoid Nephrotoxic agents and monitor BMP and electrolytes. Nephrology following.     PEG Tube Place Malfunction - PEG Tube replaced 12/13    GERD - PPI     Diet - Regular    DVT Prophylaxis - Aspirin BID    Disposition -  Discharge planning pending weaning off Versed Infusion and EEG.

## 2021-12-16 NOTE — PROGRESS NOTE ADULT - SUBJECTIVE AND OBJECTIVE BOX
Patient is a 76y Female whom presented to the hospital with ckd and manasa     PAST MEDICAL & SURGICAL HISTORY:  Dementia of frontal lobe type    Aphasic stroke    Diabetes mellitus    Respiratory failure    Hypertension    GERD (gastroesophageal reflux disease)    Constipation    Respiratory failure    CVA (cerebral vascular accident)    HTN (hypertension)    DM (diabetes mellitus)    Advanced dementia    COVID-19 virus detected    Quadriplegia    Pneumonia    Type II diabetes mellitus    Hx of appendectomy    Gastrostomy in place    Tracheostomy in place    Tracheostomy tube present    Feeding by G-tube        MEDICATIONS  (STANDING):  ALBUTerol    90 MICROgram(s) HFA Inhaler 2 Puff(s) Inhalation every 6 hours  chlorhexidine 0.12% Liquid 15 milliLiter(s) Oral Mucosa every 12 hours  dextrose 40% Gel 15 Gram(s) Oral once  dextrose 5%. 1000 milliLiter(s) (50 mL/Hr) IV Continuous <Continuous>  dextrose 5%. 1000 milliLiter(s) (100 mL/Hr) IV Continuous <Continuous>  dextrose 50% Injectable 25 Gram(s) IV Push once  dextrose 50% Injectable 12.5 Gram(s) IV Push once  dextrose 50% Injectable 25 Gram(s) IV Push once  glucagon  Injectable 1 milliGRAM(s) IntraMuscular once  insulin lispro (ADMELOG) corrective regimen sliding scale   SubCutaneous every 6 hours  lactated ringers. 1000 milliLiter(s) (100 mL/Hr) IV Continuous <Continuous>  methocarbamol 250 milliGRAM(s) Oral two times a day  pantoprazole  Injectable 40 milliGRAM(s) IV Push every 12 hours  piperacillin/tazobactam IVPB.. 3.375 Gram(s) IV Intermittent every 8 hours  polyethylene glycol 3350 17 Gram(s) Oral every 12 hours  senna 2 Tablet(s) Oral at bedtime  simethicone 80 milliGRAM(s) Chew every 6 hours  sucralfate 1 Gram(s) Oral every 6 hours  tiotropium 18 MICROgram(s) Capsule 1 Capsule(s) Inhalation daily  vancomycin  IVPB 750 milliGRAM(s) IV Intermittent every 12 hours      Allergies    codeine (Hives)    Intolerances        SOCIAL HISTORY:  Denies ETOh,Smoking,     FAMILY HISTORY:  No pertinent family history in first degree relatives        REVIEW OF SYSTEMS:    unable to obtained a good review system                                                                                                                            12-15    143  |  108  |  25<H>  ----------------------------<  158<H>  4.7   |  23  |  1.10    Ca    8.5      15 Dec 2021 07:27  Phos  3.4     12-15  Mg     2.0     12-15    TPro  6.1  /  Alb  2.2<L>  /  TBili  0.5  /  DBili  x   /  AST  21  /  ALT  21  /  AlkPhos  133<H>  12-15      CAPILLARY BLOOD GLUCOSE      POCT Blood Glucose.: 158 mg/dL (16 Dec 2021 11:49)  POCT Blood Glucose.: 182 mg/dL (16 Dec 2021 06:01)  POCT Blood Glucose.: 171 mg/dL (15 Dec 2021 23:29)  POCT Blood Glucose.: 156 mg/dL (15 Dec 2021 17:58)      Vital Signs Last 24 Hrs  T(C): 36.6 (16 Dec 2021 08:30), Max: 36.8 (15 Dec 2021 19:29)  T(F): 97.9 (16 Dec 2021 08:30), Max: 98.2 (15 Dec 2021 19:29)  HR: 70 (16 Dec 2021 13:16) (58 - 133)  BP: 133/60 (16 Dec 2021 13:00) (95/54 - 203/65)  BP(mean): 78 (16 Dec 2021 13:00) (64 - 109)  RR: 23 (16 Dec 2021 13:00) (17 - 38)  SpO2: 100% (16 Dec 2021 13:16) (87% - 100%)               PHYSICAL EXAM:    Constitutional: NAD  HEENT: conjunctive   clear   Neck:  No JVD  Respiratory: pos decrease bs pos trach  Cardiovascular: S1 and S2  Gastrointestinal: BS+, soft, pos peg   Extremities: No peripheral edema

## 2021-12-16 NOTE — PROGRESS NOTE ADULT - SUBJECTIVE AND OBJECTIVE BOX
Chief Complaint: Abnormal labs    Interval Events: No events overnight.    Review of Systems:  Unable to obtain    Physical Exam:  Vital Signs Last 24 Hrs  T(C): 36.7 (16 Dec 2021 04:00), Max: 37 (15 Dec 2021 12:00)  T(F): 98 (16 Dec 2021 04:00), Max: 98.6 (15 Dec 2021 12:00)  HR: 71 (16 Dec 2021 07:51) (57 - 133)  BP: 96/50 (16 Dec 2021 06:20) (95/54 - 203/65)  BP(mean): 64 (16 Dec 2021 06:20) (64 - 109)  RR: 22 (16 Dec 2021 06:20) (17 - 38)  SpO2: 100% (16 Dec 2021 07:51) (87% - 100%)  General: Unresponsive  HEENT: Trach  Neck: No JVD, no carotid bruit  Lungs: CTAB  CV: RRR, nl S1/S2, no M/R/G  Abdomen: S/NT/ND, +BS  Extremities: No LE edema, no cyanosis  Neuro: AAOx0  Skin: No rash    Labs:    12-15    143  |  108  |  25<H>  ----------------------------<  158<H>  4.7   |  23  |  1.10    Ca    8.5      15 Dec 2021 07:27  Phos  3.4     12-15  Mg     2.0     12-15    TPro  6.1  /  Alb  2.2<L>  /  TBili  0.5  /  DBili  x   /  AST  21  /  ALT  21  /  AlkPhos  133<H>  12-15                        9.6    17.35 )-----------( 332      ( 14 Dec 2021 13:58 )             32.9       Telemetry: Sinus rhythm

## 2021-12-16 NOTE — PROGRESS NOTE ADULT - SUBJECTIVE AND OBJECTIVE BOX
BREA BECKHAM    [facility]    Allergies    codeine (Hives)    Intolerances        PAST MEDICAL & SURGICAL HISTORY:  Dementia of frontal lobe type    Aphasic stroke    Diabetes mellitus    Respiratory failure    Hypertension    GERD (gastroesophageal reflux disease)    Constipation    Respiratory failure    CVA (cerebral vascular accident)    HTN (hypertension)    DM (diabetes mellitus)    Advanced dementia    COVID-19 virus detected    Quadriplegia    Pneumonia    Type II diabetes mellitus    Hx of appendectomy    Gastrostomy in place    Tracheostomy in place    Tracheostomy tube present    Feeding by G-tube        FAMILY HISTORY:  No pertinent family history in first degree relatives        Home Medications:  albuterol 90 mcg/inh inhalation aerosol: 2 puff(s) inhaled every 6 hours (08 Dec 2021 16:51)  Bacid (LAC) oral tablet: 2 tab(s) by gastrostomy tube once a day (08 Dec 2021 16:51)  Carafate 1 g/10 mL oral suspension: 10 milliliter(s) by gastrostomy tube 4 times a day (before meals and at bedtime) for 14 days (Started 6/4/21) (08 Dec 2021 16:51)  chlorhexidine 0.12% mucous membrane liquid: 15 milliliter(s) mucous membrane 2 times a day (08 Dec 2021 16:51)  insulin lispro 100 units/mL injectable solution: injectable 3 times a day ; sliding scale coverage  (14 Dec 2021 10:38)  ipratropium-albuterol 0.5 mg-2.5 mg/3 mL inhalation solution: 3 milliliter(s) inhaled 4 times a day (08 Dec 2021 16:51)  LORazepam 1 mg oral tablet: 1 tab(s) by gastrostomy tube 3 times a day, As Needed (08 Dec 2021 16:51)  methocarbamol 500 mg oral tablet: 1 tab(s) orally 2 times a day (14 Dec 2021 19:18)  pantoprazole 40 mg oral granule, delayed release: 40 milligram(s) by gastrostomy tube 2 times a day (08 Dec 2021 16:51)  piperacillin-tazobactam 3 g-0.375 g/50 mL intravenous solution: intravenous every 8 hours (14 Dec 2021 19:18)  polyethylene glycol 3350 oral powder for reconstitution: 17 gram(s) orally every 12 hours (08 Dec 2021 16:51)  ProAir HFA 90 mcg/inh inhalation aerosol: 2 puff(s) inhaled every 6 hours (08 Dec 2021 16:51)  senna 8.6 mg oral tablet: 3 tab(s) by gastrostomy tube once a day (at bedtime) (08 Dec 2021 16:51)  simethicone 80 mg oral tablet, chewable: 1 tab(s) orally every 6 hours (08 Dec 2021 16:51)  Tylenol 325 mg oral tablet: 2 tab(s) by gastrostomy tube once a day; 60 minutes prior to dressing change  (08 Dec 2021 16:51)      MEDICATIONS  (STANDING):  ALBUTerol    90 MICROgram(s) HFA Inhaler 2 Puff(s) Inhalation every 6 hours  chlorhexidine 0.12% Liquid 15 milliLiter(s) Oral Mucosa every 12 hours  dextrose 40% Gel 15 Gram(s) Oral once  dextrose 5%. 1000 milliLiter(s) (50 mL/Hr) IV Continuous <Continuous>  dextrose 5%. 1000 milliLiter(s) (100 mL/Hr) IV Continuous <Continuous>  dextrose 50% Injectable 25 Gram(s) IV Push once  dextrose 50% Injectable 12.5 Gram(s) IV Push once  dextrose 50% Injectable 25 Gram(s) IV Push once  glucagon  Injectable 1 milliGRAM(s) IntraMuscular once  heparin   Injectable 5000 Unit(s) SubCutaneous every 12 hours  insulin lispro (ADMELOG) corrective regimen sliding scale   SubCutaneous every 6 hours  methocarbamol 500 milliGRAM(s) Oral two times a day  midazolam Infusion 0.02 mG/kG/Hr (0.89 mL/Hr) IV Continuous <Continuous>  pantoprazole  Injectable 40 milliGRAM(s) IV Push every 12 hours  piperacillin/tazobactam IVPB.. 3.375 Gram(s) IV Intermittent every 8 hours  polyethylene glycol 3350 17 Gram(s) Oral every 12 hours  senna 2 Tablet(s) Oral at bedtime  simethicone 80 milliGRAM(s) Chew every 6 hours  sucralfate suspension 1 Gram(s) Enteral Tube every 6 hours    MEDICATIONS  (PRN):  acetaminophen     Tablet .. 650 milliGRAM(s) Oral every 6 hours PRN Temp greater or equal to 38C (100.4F), Mild Pain (1 - 3)  midazolam Injectable 2 milliGRAM(s) IV Push every 2 hours PRN Seizure activity      Diet, NPO with Tube Feed:   Tube Feeding Modality: Gastrostomy  Glucerna 1.5 Horacio  Total Volume for 24 Hours (mL): 1000  Continuous  Starting Tube Feed Rate mL per Hour: 20  Increase Tube Feed Rate by (mL): 10     Every 6 hours  Until Goal Tube Feed Rate (mL per Hour): 50  Tube Feed Duration (in Hours): 20  Tube Feed Start Time: 17:00 (12-09-21 @ 16:49) [Active]          Vital Signs Last 24 Hrs  T(C): 36.6 (16 Dec 2021 08:30), Max: 37 (15 Dec 2021 12:00)  T(F): 97.9 (16 Dec 2021 08:30), Max: 98.6 (15 Dec 2021 12:00)  HR: 75 (16 Dec 2021 11:00) (57 - 133)  BP: 128/65 (16 Dec 2021 11:00) (95/54 - 203/65)  BP(mean): 85 (16 Dec 2021 11:00) (64 - 109)  RR: 18 (16 Dec 2021 11:00) (17 - 38)  SpO2: 99% (16 Dec 2021 11:00) (87% - 100%)      12-15-21 @ 07:01  -  12-16-21 @ 07:00  --------------------------------------------------------  IN: 1452.9 mL / OUT: 0 mL / NET: 1452.9 mL        Mode: AC/ CMV (Assist Control/ Continuous Mandatory Ventilation), RR (machine): 18, TV (machine): 400, FiO2: 40, PEEP: 5, ITime: 1, MAP: 9, PIP: 32      LABS:                        9.6    17.35 )-----------( 332      ( 14 Dec 2021 13:58 )             32.9     12-15    143  |  108  |  25<H>  ----------------------------<  158<H>  4.7   |  23  |  1.10    Ca    8.5      15 Dec 2021 07:27  Phos  3.4     12-15  Mg     2.0     12-15    TPro  6.1  /  Alb  2.2<L>  /  TBili  0.5  /  DBili  x   /  AST  21  /  ALT  21  /  AlkPhos  133<H>  12-15              WBC:  WBC Count: 17.35 K/uL (12-14 @ 13:58)  WBC Count: 5.60 K/uL (12-13 @ 06:20)      MICROBIOLOGY:  RECENT CULTURES:  12-14 .Blood Blood XXXX XXXX   No growth to date.    12-11 .Sputum Sputum Serratia marcescens  Pseudomonas aeruginosa (Carbapenem Resistant)  Stenotrophomonas maltophilia   Moderate polymorphonuclear leukocytes per low power field  Rare Squamous epithelial cells per low power field  Numerous Gram Negative Rods seen per oil power field   Few Pseudomonas aeruginosa (Carbapenem Resistant)  Moderate Serratia marcescens  Moderate Stenotrophomonas maltophilia  Normal Respiratory Elvira present                    Sodium:  Sodium, Serum: 143 mmol/L (12-15 @ 07:27)  Sodium, Serum: 142 mmol/L (12-14 @ 15:28)  Sodium, Serum: 142 mmol/L (12-13 @ 06:20)      1.10 mg/dL 12-15 @ 07:27  1.20 mg/dL 12-14 @ 15:28  1.03 mg/dL 12-13 @ 06:20      Hemoglobin:  Hemoglobin: 9.6 g/dL (12-14 @ 13:58)  Hemoglobin: 8.9 g/dL (12-13 @ 06:20)      Platelets: Platelet Count - Automated: 332 K/uL (12-14 @ 13:58)  Platelet Count - Automated: 236 K/uL (12-13 @ 06:20)      LIVER FUNCTIONS - ( 15 Dec 2021 07:27 )  Alb: 2.2 g/dL / Pro: 6.1 g/dL / ALK PHOS: 133 U/L / ALT: 21 U/L DA / AST: 21 U/L / GGT: x                 RADIOLOGY & ADDITIONAL STUDIES:      MICROBIOLOGY:  RECENT CULTURES:  12-14 .Blood Blood XXXX XXXX   No growth to date.    12-11 .Sputum Sputum Serratia marcescens  Pseudomonas aeruginosa (Carbapenem Resistant)  Stenotrophomonas maltophilia   Moderate polymorphonuclear leukocytes per low power field  Rare Squamous epithelial cells per low power field  Numerous Gram Negative Rods seen per oil power field   Few Pseudomonas aeruginosa (Carbapenem Resistant)  Moderate Serratia marcescens  Moderate Stenotrophomonas maltophilia  Normal Respiratory Elvira present

## 2021-12-16 NOTE — PROGRESS NOTE ADULT - SUBJECTIVE AND OBJECTIVE BOX
Neurology follow up note    BREA [id number]      Interval History:    Patient feels ok no new complaints.    Allergies    codeine (Hives)    Intolerances        MEDICATIONS    acetaminophen     Tablet .. 650 milliGRAM(s) Oral every 6 hours PRN  ALBUTerol    90 MICROgram(s) HFA Inhaler 2 Puff(s) Inhalation every 6 hours  chlorhexidine 0.12% Liquid 15 milliLiter(s) Oral Mucosa every 12 hours  dextrose 40% Gel 15 Gram(s) Oral once  dextrose 5%. 1000 milliLiter(s) IV Continuous <Continuous>  dextrose 5%. 1000 milliLiter(s) IV Continuous <Continuous>  dextrose 50% Injectable 25 Gram(s) IV Push once  dextrose 50% Injectable 12.5 Gram(s) IV Push once  dextrose 50% Injectable 25 Gram(s) IV Push once  glucagon  Injectable 1 milliGRAM(s) IntraMuscular once  heparin   Injectable 5000 Unit(s) SubCutaneous every 12 hours  insulin lispro (ADMELOG) corrective regimen sliding scale   SubCutaneous every 6 hours  methocarbamol 500 milliGRAM(s) Oral two times a day  midazolam Infusion 0.02 mG/kG/Hr IV Continuous <Continuous>  midazolam Injectable 2 milliGRAM(s) IV Push every 2 hours PRN  pantoprazole  Injectable 40 milliGRAM(s) IV Push every 12 hours  piperacillin/tazobactam IVPB.. 3.375 Gram(s) IV Intermittent every 8 hours  polyethylene glycol 3350 17 Gram(s) Oral every 12 hours  senna 2 Tablet(s) Oral at bedtime  simethicone 80 milliGRAM(s) Chew every 6 hours  sucralfate suspension 1 Gram(s) Enteral Tube every 6 hours              Vital Signs Last 24 Hrs  T(C): 36.6 (16 Dec 2021 08:30), Max: 36.8 (15 Dec 2021 19:29)  T(F): 97.9 (16 Dec 2021 08:30), Max: 98.2 (15 Dec 2021 19:29)  HR: 75 (16 Dec 2021 11:00) (57 - 133)  BP: 128/65 (16 Dec 2021 11:00) (95/54 - 203/65)  BP(mean): 85 (16 Dec 2021 11:00) (64 - 109)  RR: 18 (16 Dec 2021 11:00) (17 - 38)  SpO2: 99% (16 Dec 2021 11:00) (87% - 100%)    REVIEW OF SYSTEMS:  Could not be obtained secondary to the patient being nonverbal.    PHYSICAL EXAMINATION:    HEENT:  Head:  Normocephalic.  Eyes:  No scleral icterus.  Ears:  Hard to evaluate secondary to the patient being nonverbal.  NECK:  Had increased tone, tracheostomy was in place.  RESPIRATORY:  Decreased breath sounds bilaterally.  CARDIOVASCULAR:  S1 and S2 heard.  ABDOMEN:  Soft and nontender.  EXTREMITIES:  No clubbing or cyanosis was noted.      NEUROLOGIC:  The patient was resting in bed with eyes open.  Upon stimulation of the patient, her eyes would roll up, with subtle shaking.  Speech:  Nonverbal.  Tone in all four extremities increased.  Bilateral upper extremities were in a flexed position.  Bilateral lower extremities were in the straight position.                  LABS:  CBC Full  -  ( 14 Dec 2021 13:58 )  WBC Count : 17.35 K/uL  RBC Count : 3.77 M/uL  Hemoglobin : 9.6 g/dL  Hematocrit : 32.9 %  Platelet Count - Automated : 332 K/uL  Mean Cell Volume : 87.3 fl  Mean Cell Hemoglobin : 25.5 pg  Mean Cell Hemoglobin Concentration : 29.2 gm/dL  Auto Neutrophil # : 16.02 K/uL  Auto Lymphocyte # : 0.37 K/uL  Auto Monocyte # : 0.82 K/uL  Auto Eosinophil # : 0.01 K/uL  Auto Basophil # : 0.04 K/uL  Auto Neutrophil % : 92.4 %  Auto Lymphocyte % : 2.1 %  Auto Monocyte % : 4.7 %  Auto Eosinophil % : 0.1 %  Auto Basophil % : 0.2 %      12-15    143  |  108  |  25<H>  ----------------------------<  158<H>  4.7   |  23  |  1.10    Ca    8.5      15 Dec 2021 07:27  Phos  3.4     12-15  Mg     2.0     12-15    TPro  6.1  /  Alb  2.2<L>  /  TBili  0.5  /  DBili  x   /  AST  21  /  ALT  21  /  AlkPhos  133<H>  12-15    Hemoglobin A1C:     LIVER FUNCTIONS - ( 15 Dec 2021 07:27 )  Alb: 2.2 g/dL / Pro: 6.1 g/dL / ALK PHOS: 133 U/L / ALT: 21 U/L DA / AST: 21 U/L / GGT: x           Vitamin B12         RADIOLOGY    ANALYSIS AND PLAN:  This is a 76-year-old with episodes of altered mental status and a history of shaking.  For altered mental status, metabolic encephalopathy secondary to underlying infectious type process and possibly underlying pneumonia.  For episodes of abnormal movements, as per my conversation with the spouse in the past, these were nonepileptic in nature.  As per my conversation with him in the past, does not want any antiseizure medications but does want EEG monitoring now   increased muscle relaxants to see if that will help with tone, with no significant changes.  For history of diabetes, strict control of blood sugars.  monition  respiratory status as needed   Events noted spoke to spouse today he believes that her events are GI and oxygen related ---- spoke to him about action induced events patient events appears to be continuous after she is stumiled he is now agreeable to ativan 1 mg q4 h  he still wants patient to be transferred for EEG monitoring to see if EEG tracing captures anything when she has these events     The spouse's name is Marciano, his telephone number is [phone] 12/16    Greater than 30 minutes of time was spent with the patient, plan of care, reviewing data, and speaking to the multidisciplinary healthcare team with greater than 50% of that time in counseling and care coordination.    Thank you for the courtesy of this consultation.     Neurology follow up note    BREA [id number]      Interval History:    Patient feels ok no new complaints.    Allergies    codeine (Hives)    Intolerances        MEDICATIONS    acetaminophen     Tablet .. 650 milliGRAM(s) Oral every 6 hours PRN  ALBUTerol    90 MICROgram(s) HFA Inhaler 2 Puff(s) Inhalation every 6 hours  chlorhexidine 0.12% Liquid 15 milliLiter(s) Oral Mucosa every 12 hours  dextrose 40% Gel 15 Gram(s) Oral once  dextrose 5%. 1000 milliLiter(s) IV Continuous <Continuous>  dextrose 5%. 1000 milliLiter(s) IV Continuous <Continuous>  dextrose 50% Injectable 25 Gram(s) IV Push once  dextrose 50% Injectable 12.5 Gram(s) IV Push once  dextrose 50% Injectable 25 Gram(s) IV Push once  glucagon  Injectable 1 milliGRAM(s) IntraMuscular once  heparin   Injectable 5000 Unit(s) SubCutaneous every 12 hours  insulin lispro (ADMELOG) corrective regimen sliding scale   SubCutaneous every 6 hours  methocarbamol 500 milliGRAM(s) Oral two times a day  midazolam Infusion 0.02 mG/kG/Hr IV Continuous <Continuous>  midazolam Injectable 2 milliGRAM(s) IV Push every 2 hours PRN  pantoprazole  Injectable 40 milliGRAM(s) IV Push every 12 hours  piperacillin/tazobactam IVPB.. 3.375 Gram(s) IV Intermittent every 8 hours  polyethylene glycol 3350 17 Gram(s) Oral every 12 hours  senna 2 Tablet(s) Oral at bedtime  simethicone 80 milliGRAM(s) Chew every 6 hours  sucralfate suspension 1 Gram(s) Enteral Tube every 6 hours              Vital Signs Last 24 Hrs  T(C): 36.6 (16 Dec 2021 08:30), Max: 36.8 (15 Dec 2021 19:29)  T(F): 97.9 (16 Dec 2021 08:30), Max: 98.2 (15 Dec 2021 19:29)  HR: 75 (16 Dec 2021 11:00) (57 - 133)  BP: 128/65 (16 Dec 2021 11:00) (95/54 - 203/65)  BP(mean): 85 (16 Dec 2021 11:00) (64 - 109)  RR: 18 (16 Dec 2021 11:00) (17 - 38)  SpO2: 99% (16 Dec 2021 11:00) (87% - 100%)    REVIEW OF SYSTEMS:  Could not be obtained secondary to the patient being nonverbal.    PHYSICAL EXAMINATION:    HEENT:  Head:  Normocephalic.  Eyes:  No scleral icterus.  Ears:  Hard to evaluate secondary to the patient being nonverbal.  NECK:  Had increased tone, tracheostomy was in place.  RESPIRATORY:  Decreased breath sounds bilaterally.  CARDIOVASCULAR:  S1 and S2 heard.  ABDOMEN:  Soft and nontender.  EXTREMITIES:  No clubbing or cyanosis was noted.      NEUROLOGIC:  The patient was resting in bed with eyes open.  Upon stimulation of the patient, her eyes would roll up, with subtle shaking.  Speech:  Nonverbal.  Tone in all four extremities increased.  Bilateral upper extremities were in a flexed position.  Bilateral lower extremities were in the straight position.                  LABS:  CBC Full  -  ( 14 Dec 2021 13:58 )  WBC Count : 17.35 K/uL  RBC Count : 3.77 M/uL  Hemoglobin : 9.6 g/dL  Hematocrit : 32.9 %  Platelet Count - Automated : 332 K/uL  Mean Cell Volume : 87.3 fl  Mean Cell Hemoglobin : 25.5 pg  Mean Cell Hemoglobin Concentration : 29.2 gm/dL  Auto Neutrophil # : 16.02 K/uL  Auto Lymphocyte # : 0.37 K/uL  Auto Monocyte # : 0.82 K/uL  Auto Eosinophil # : 0.01 K/uL  Auto Basophil # : 0.04 K/uL  Auto Neutrophil % : 92.4 %  Auto Lymphocyte % : 2.1 %  Auto Monocyte % : 4.7 %  Auto Eosinophil % : 0.1 %  Auto Basophil % : 0.2 %      12-15    143  |  108  |  25<H>  ----------------------------<  158<H>  4.7   |  23  |  1.10    Ca    8.5      15 Dec 2021 07:27  Phos  3.4     12-15  Mg     2.0     12-15    TPro  6.1  /  Alb  2.2<L>  /  TBili  0.5  /  DBili  x   /  AST  21  /  ALT  21  /  AlkPhos  133<H>  12-15    Hemoglobin A1C:     LIVER FUNCTIONS - ( 15 Dec 2021 07:27 )  Alb: 2.2 g/dL / Pro: 6.1 g/dL / ALK PHOS: 133 U/L / ALT: 21 U/L DA / AST: 21 U/L / GGT: x           Vitamin B12         RADIOLOGY    ANALYSIS AND PLAN:  This is a 76-year-old with episodes of altered mental status and a history of shaking.  For altered mental status, metabolic encephalopathy secondary to underlying infectious type process and possibly underlying pneumonia.  For episodes of abnormal movements, as per my conversation with the spouse in the past, these were nonepileptic in nature.  As per my conversation with him in the past, does not want any antiseizure medications but does want EEG monitoring now   increased muscle relaxants to see if that will help with tone, with no significant changes.  For history of diabetes, strict control of blood sugars.  monition  respiratory status as needed   Events noted spoke to spouse today he believes that her events are GI and oxygen related ---- spoke to him about action induced events patient events appears to be continuous after she is stimulated   he is now agreeable to ativan 1 mg q4 h  and attempt to wean off versed --- he still wants patient to be transferred for EEG monitoring to see if EEG tracing captures anything when she has these events     The spouse's name is Marciano, his telephone number is [phone] 12/16    Greater than 30 minutes of time was spent with the patient, plan of care, reviewing data, and speaking to the multidisciplinary healthcare team with greater than 50% of that time in counseling and care coordination.    Thank you for the courtesy of this consultation.

## 2021-12-16 NOTE — PROGRESS NOTE ADULT - ASSESSMENT
75 yo F sent from [facility] home with abnormal labs. Patient unable to contribute to history. Had labs drawn this afternoon and reportedly has a Hgb of 6. Patient does not have a vaughn catheter. No report of fever, vomiting, bleeding. Receiving IV Zosyn through PICC line rt arm    PEG replaced  ID note reviewed  CM notes reviewed -     was not able to transport for [facility] testing  Versed   on Zosyn  2 diff GI MDs notes reviewed - not a candidate for EGD  on TF - on IVF -   on Hep sq dvt p  GI follow up - serial Hgb  ct imaging reviewed - poss PNA -   vs noted  HD reviewed       HCP  Pt is full code  CCM and Cardio notes reviewed  lives in SNF  vent dep -   end stage dementia  trach and peg  HOB elev  asp prec  oral hygiene  skin care  assist with all needs - ADL  work up in progress

## 2021-12-16 NOTE — PROGRESS NOTE ADULT - SUBJECTIVE AND OBJECTIVE BOX
[facility], Division of Infectious Diseases  MOIZ Lora Y. Patel, S. Shah  [phone]  ([phone] - weekdays after 5pm and weekends)    Name: BREA BECKHAM  Age/Gender: 76y Female  MRN: [id number]    Interval History:  Patient seen this morning.   Notes reviewed.   Afebrile.   Awaiting transfer for continuous EEG monitoring    Allergies: codeine (Hives)      Objective:  Vitals:   T(F): 98 (12-16-21 @ 04:00), Max: 98.6 (12-15-21 @ 12:00)  HR: 71 (12-16-21 @ 07:51) (57 - 133)  BP: 96/50 (12-16-21 @ 06:20) (95/54 - 203/65)  RR: 22 (12-16-21 @ 06:20) (17 - 38)  SpO2: 100% (12-16-21 @ 07:51) (87% - 100%)  Physical Examination:  General: no acute distress  HEENT: trachesotomy  Cardio: S1, S2 present, irregularly irregular, normal rate  Resp: on vent  Abd: soft, distended  Neuro: aphonia, does not respond to commands  Ext: b/l LE edema, contractures b/l  Skin: warm, dry, no visible rash  Lines:    Laboratory Studies:  CBC:                       9.6    17.35 )-----------( 332      ( 14 Dec 2021 13:58 )             32.9     WBC Trend:  17.35 12-14-21 @ 13:58  5.60 12-13-21 @ 06:20  5.82 12-12-21 @ 06:29  6.98 12-11-21 @ 09:39  13.27 12-10-21 @ 08:38  9.43 12-09-21 @ 20:48    CMP: 12-15    143  |  108  |  25<H>  ----------------------------<  158<H>  4.7   |  23  |  1.10    Ca    8.5      15 Dec 2021 07:27  Phos  3.4     12-15  Mg     2.0     12-15    TPro  6.1  /  Alb  2.2<L>  /  TBili  0.5  /  DBili  x   /  AST  21  /  ALT  21  /  AlkPhos  133<H>  12-15      LIVER FUNCTIONS - ( 15 Dec 2021 07:27 )  Alb: 2.2 g/dL / Pro: 6.1 g/dL / ALK PHOS: 133 U/L / ALT: 21 U/L DA / AST: 21 U/L / GGT: x               Microbiology: reviewed     Culture - Blood (collected 12-14-21 @ 22:29)  Source: .Blood Blood  Preliminary Report (12-15-21 @ 23:02):    No growth to date.    Culture - Sputum (collected 12-11-21 @ 00:41)  Source: .Sputum Sputum  Gram Stain (12-11-21 @ 04:56):    Moderate polymorphonuclear leukocytes per low power field    Rare Squamous epithelial cells per low power field    Numerous Gram Negative Rods seen per oil power field  Final Report (12-12-21 @ 17:47):    Few Pseudomonas aeruginosa (Carbapenem Resistant)    Moderate Serratia marcescens    Moderate Stenotrophomonas maltophilia    Normal Respiratory Elvira present  Organism: Stenotrophomonas maltophilia (12-12-21 @ 18:03)      -  Ceftazidime: R >16      -  Levofloxacin: S <=0.5      -  Trimethoprim/Sulfamethoxazole: S <=0.5/9.5      Method Type: PRATIK  Organism: Pseudomonas aeruginosa (Carbapenem Resistant) (12-12-21 @ 18:03)      -  Amikacin: S <=16      -  Aztreonam: S <=4      -  Cefepime: S 4      -  Ceftazidime: S 4      -  Ceftazidime/Avibactam: S      -  Ceftolozane/tazobactam: S      -  Ciprofloxacin: S <=0.25      -  Gentamicin: S <=2      -  Imipenem: R >8      -  Levofloxacin: S <=0.5      -  Meropenem: I 4      -  Piperacillin/Tazobactam: S <=8      -  Tobramycin: S <=2      Method Type: PRATIK  Organism: Serratia marcescens (12-12-21 @ 18:03)      -  Amikacin: S <=16      -  Amoxicillin/Clavulanic Acid: R >16/8      -  Ampicillin: R >16 These ampicillin results predict results for amoxicillin      -  Ampicillin/Sulbactam: R >16/8 Enterobacter, Klebsiella aerogenes, Citrobacter, and Serratia may develop resistance during prolonged therapy (3-4 days)      -  Aztreonam: S <=4      -  Cefazolin: R >16 Enterobacter, Klebsiella aerogenes, Citrobacter, and Serratia may develop resistance during prolonged therapy (3-4 days)      -  Cefepime: S <=2      -  Cefoxitin: R <=8      -  Ceftriaxone: I 2 Enterobacter, Klebsiella aerogenes, Citrobacter, and Serratia may develop resistance during prolonged therapy      -  Ciprofloxacin: R >2      -  Ertapenem: S <=0.5      -  Gentamicin: S <=2      -  Levofloxacin: R 2      -  Meropenem: S <=1      -  Piperacillin/Tazobactam: S <=8      -  Tobramycin: S <=2      -  Trimethoprim/Sulfamethoxazole: S <=0.5/9.5      Method Type: PRATIK  Organism: Serratia marcescens  Pseudomonas aeruginosa (Carbapenem Resistant)  Stenotrophomonas maltophilia (12-12-21 @ 17:47)    Culture - Urine (collected 12-08-21 @ 17:55)  Source: Clean Catch Clean Catch (Midstream)  Final Report (12-09-21 @ 13:58):    <10,000 CFU/mL Normal Urogenital Elvira    Culture - Blood (collected 12-08-21 @ 15:24)  Source: .Blood Blood-Peripheral  Final Report (12-13-21 @ 16:01):    No Growth Final    Culture - Blood (collected 12-08-21 @ 15:24)  Source: .Blood Blood-Peripheral  Final Report (12-13-21 @ 16:01):    No Growth Final      Radiology: reviewed     Medications:  acetaminophen     Tablet .. 650 milliGRAM(s) Oral every 6 hours PRN  ALBUTerol    90 MICROgram(s) HFA Inhaler 2 Puff(s) Inhalation every 6 hours  chlorhexidine 0.12% Liquid 15 milliLiter(s) Oral Mucosa every 12 hours  dextrose 40% Gel 15 Gram(s) Oral once  dextrose 5%. 1000 milliLiter(s) IV Continuous <Continuous>  dextrose 5%. 1000 milliLiter(s) IV Continuous <Continuous>  dextrose 50% Injectable 25 Gram(s) IV Push once  dextrose 50% Injectable 12.5 Gram(s) IV Push once  dextrose 50% Injectable 25 Gram(s) IV Push once  glucagon  Injectable 1 milliGRAM(s) IntraMuscular once  heparin   Injectable 5000 Unit(s) SubCutaneous every 12 hours  insulin lispro (ADMELOG) corrective regimen sliding scale   SubCutaneous every 6 hours  methocarbamol 500 milliGRAM(s) Oral two times a day  midazolam Infusion 0.02 mG/kG/Hr IV Continuous <Continuous>  midazolam Injectable 2 milliGRAM(s) IV Push every 2 hours PRN  pantoprazole  Injectable 40 milliGRAM(s) IV Push every 12 hours  piperacillin/tazobactam IVPB.. 3.375 Gram(s) IV Intermittent every 8 hours  polyethylene glycol 3350 17 Gram(s) Oral every 12 hours  senna 2 Tablet(s) Oral at bedtime  simethicone 80 milliGRAM(s) Chew every 6 hours  sucralfate suspension 1 Gram(s) Enteral Tube every 6 hours    Antimicrobials:  piperacillin/tazobactam IVPB.. 3.375 Gram(s) IV Intermittent every 8 hours

## 2021-12-16 NOTE — PROGRESS NOTE ADULT - SUBJECTIVE AND OBJECTIVE BOX
Date/Time Patient Seen:  		  Referring MD:   Data Reviewed	       Patient is a 76y old  Female who presents with a chief complaint of Anemia (15 Dec 2021 21:52)      Subjective/HPI     PAST MEDICAL & SURGICAL HISTORY:  Dementia of frontal lobe type    Aphasic stroke    Diabetes mellitus    Respiratory failure    Hypertension    GERD (gastroesophageal reflux disease)    Constipation    Respiratory failure    CVA (cerebral vascular accident)    HTN (hypertension)    DM (diabetes mellitus)    Advanced dementia    COVID-19 virus detected    Quadriplegia    Pneumonia    Type II diabetes mellitus    Hx of appendectomy    Gastrostomy in place    Tracheostomy in place    Tracheostomy tube present    Feeding by G-tube          Medication list         MEDICATIONS  (STANDING):  ALBUTerol    90 MICROgram(s) HFA Inhaler 2 Puff(s) Inhalation every 6 hours  chlorhexidine 0.12% Liquid 15 milliLiter(s) Oral Mucosa every 12 hours  dextrose 40% Gel 15 Gram(s) Oral once  dextrose 5%. 1000 milliLiter(s) (50 mL/Hr) IV Continuous <Continuous>  dextrose 5%. 1000 milliLiter(s) (100 mL/Hr) IV Continuous <Continuous>  dextrose 50% Injectable 25 Gram(s) IV Push once  dextrose 50% Injectable 12.5 Gram(s) IV Push once  dextrose 50% Injectable 25 Gram(s) IV Push once  glucagon  Injectable 1 milliGRAM(s) IntraMuscular once  heparin   Injectable 5000 Unit(s) SubCutaneous every 12 hours  insulin lispro (ADMELOG) corrective regimen sliding scale   SubCutaneous every 6 hours  methocarbamol 500 milliGRAM(s) Oral two times a day  midazolam Infusion 0.02 mG/kG/Hr (0.89 mL/Hr) IV Continuous <Continuous>  pantoprazole  Injectable 40 milliGRAM(s) IV Push every 12 hours  piperacillin/tazobactam IVPB.. 3.375 Gram(s) IV Intermittent every 8 hours  polyethylene glycol 3350 17 Gram(s) Oral every 12 hours  senna 2 Tablet(s) Oral at bedtime  simethicone 80 milliGRAM(s) Chew every 6 hours  sucralfate suspension 1 Gram(s) Enteral Tube every 6 hours    MEDICATIONS  (PRN):  acetaminophen     Tablet .. 650 milliGRAM(s) Oral every 6 hours PRN Temp greater or equal to 38C (100.4F), Mild Pain (1 - 3)  midazolam Injectable 2 milliGRAM(s) IV Push every 2 hours PRN Seizure activity         Vitals log        ICU Vital Signs Last 24 Hrs  T(C): 36.7 (16 Dec 2021 04:00), Max: 37 (15 Dec 2021 12:00)  T(F): 98 (16 Dec 2021 04:00), Max: 98.6 (15 Dec 2021 12:00)  HR: 133 (16 Dec 2021 06:00) (55 - 133)  BP: 200/81 (16 Dec 2021 06:00) (95/54 - 203/65)  BP(mean): 109 (16 Dec 2021 06:00) (67 - 109)  ABP: --  ABP(mean): --  RR: 30 (16 Dec 2021 06:00) (17 - 38)  SpO2: 92% (16 Dec 2021 06:00) (87% - 100%)       Mode: AC/ CMV (Assist Control/ Continuous Mandatory Ventilation)  RR (machine): 18  TV (machine): 4200  FiO2: 40  PEEP: 5  ITime: 1  MAP: 12  PIP: 40      Input and Output:  I&O's Detail    14 Dec 2021 07:01  -  15 Dec 2021 07:00  --------------------------------------------------------  IN:    Enteral Tube Flush: 200 mL    Glucerna 1.5: 800 mL    IV PiggyBack: 100 mL    Midazolam: 14.7 mL  Total IN: 1114.7 mL    OUT:    Voided (mL): 1350 mL  Total OUT: 1350 mL    Total NET: -235.3 mL      15 Dec 2021 07:01  -  16 Dec 2021 06:54  --------------------------------------------------------  IN:    Enteral Tube Flush: 115 mL    Glucerna 1.5: 1100 mL    IV PiggyBack: 200 mL    Midazolam: 37.9 mL  Total IN: 1452.9 mL    OUT:  Total OUT: 0 mL    Total NET: 1452.9 mL          Lab Data                        9.6    17.35 )-----------( 332      ( 14 Dec 2021 13:58 )             32.9     12-15    143  |  108  |  25<H>  ----------------------------<  158<H>  4.7   |  23  |  1.10    Ca    8.5      15 Dec 2021 07:27  Phos  3.4     12-15  Mg     2.0     12-15    TPro  6.1  /  Alb  2.2<L>  /  TBili  0.5  /  DBili  x   /  AST  21  /  ALT  21  /  AlkPhos  133<H>  12-15            Review of Systems	      Objective     Physical Examination    heart s1s2  lung dec BS  abd soft      Pertinent Lab findings & Imaging      Annalise:  NO   Adequate UO     I&O's Detail    14 Dec 2021 07:01  -  15 Dec 2021 07:00  --------------------------------------------------------  IN:    Enteral Tube Flush: 200 mL    Glucerna 1.5: 800 mL    IV PiggyBack: 100 mL    Midazolam: 14.7 mL  Total IN: 1114.7 mL    OUT:    Voided (mL): 1350 mL  Total OUT: 1350 mL    Total NET: -235.3 mL      15 Dec 2021 07:01  -  16 Dec 2021 06:54  --------------------------------------------------------  IN:    Enteral Tube Flush: 115 mL    Glucerna 1.5: 1100 mL    IV PiggyBack: 200 mL    Midazolam: 37.9 mL  Total IN: 1452.9 mL    OUT:  Total OUT: 0 mL    Total NET: 1452.9 mL               Discussed with:     Cultures:	        Radiology

## 2021-12-16 NOTE — PROGRESS NOTE ADULT - SUBJECTIVE AND OBJECTIVE BOX
Patient is a 76y old  Female who presents with a chief complaint of Anemia (16 Dec 2021 23:43)    BRIEF HOSPITAL COURSE:   HPI:  ***Patient with dementia and is not responsive.  Collateral information obtained from chart and notes.***    75F with chronic resp failure - vent dependent, hx of subarachnoid hemorrhage, hx of cardiac arrest, dementia s/p PEG, HTN, DM2 on insulin, hx of CVA, GERD, COPD, admission here from 9/25 to 10/4 and 11/7-11/11 for respiratory failure/sepsis possibly 2/2 aspiration vs vent associated PNA who presents from [facility] for abnormal labs.  Patient does not contribute to history. In the ED, patient's initial triage vitals were BP 88/37, HR 81, RR 18, 100% on vent and T 99 F.  Labs showed leukocytosis of 12.18, HGB 5.2, BUN/Cr 89/2.00. CXR: Tracheostomy cannula reidentified in position. No change heart mediastinum. Widespread bilateral airspace disease slightly improved.  correlate for pulmonary edema and/or infection. No significant pleural effusion. No pneumothorax. Patient's hand projects over portion of the right base. CT chest and A/P pending  (08 Dec 2021 15:50)    Events last 24 hours:   - Off versed infusion, on Ativan 1mg q4h for seizure activity   - Awaiting bed for cvEEG    Review of Systems:  Unable to assess given clinical condition     PAST MEDICAL & SURGICAL HISTORY:  Dementia of frontal lobe type  Aphasic stroke  Diabetes mellitus  Respiratory failure  Hypertension  GERD (gastroesophageal reflux disease)  Constipation  Respiratory failure  CVA (cerebral vascular accident)  HTN (hypertension)  DM (diabetes mellitus)  Advanced dementia  COVID-19 virus detected  Quadriplegia  Pneumonia  Type II diabetes mellitus  Hx of appendectomy  Gastrostomy in place  Tracheostomy in place  Tracheostomy tube present  Feeding by G-tube    Medications:  piperacillin/tazobactam IVPB.. 3.375 Gram(s) IV Intermittent every 8 hours  ALBUTerol    90 MICROgram(s) HFA Inhaler 2 Puff(s) Inhalation every 6 hours  acetaminophen     Tablet .. 650 milliGRAM(s) Oral every 6 hours PRN  LORazepam     Tablet 1 milliGRAM(s) Oral every 4 hours  methocarbamol 500 milliGRAM(s) Oral two times a day  midazolam Injectable 2 milliGRAM(s) IV Push every 2 hours PRN  heparin   Injectable 5000 Unit(s) SubCutaneous every 12 hours  pantoprazole  Injectable 40 milliGRAM(s) IV Push every 12 hours  polyethylene glycol 3350 17 Gram(s) Oral every 12 hours  senna 2 Tablet(s) Oral at bedtime  simethicone 80 milliGRAM(s) Chew every 6 hours  sucralfate suspension 1 Gram(s) Enteral Tube every 6 hours  dextrose 40% Gel 15 Gram(s) Oral once  dextrose 50% Injectable 25 Gram(s) IV Push once  dextrose 50% Injectable 12.5 Gram(s) IV Push once  dextrose 50% Injectable 25 Gram(s) IV Push once  glucagon  Injectable 1 milliGRAM(s) IntraMuscular once  insulin lispro (ADMELOG) corrective regimen sliding scale   SubCutaneous every 6 hours  dextrose 5%. 1000 milliLiter(s) IV Continuous <Continuous>  dextrose 5%. 1000 milliLiter(s) IV Continuous <Continuous>  chlorhexidine 0.12% Liquid 15 milliLiter(s) Oral Mucosa every 12 hours    Mode: AC/ CMV (Assist Control/ Continuous Mandatory Ventilation)  RR (machine): 18  TV (machine): 400  FiO2: 40  PEEP: 5  ITime: 1  MAP: 13  PIP: 35    ICU Vital Signs Last 24 Hrs  T(C): 36.7 (17 Dec 2021 00:12), Max: 36.8 (16 Dec 2021 15:34)  T(F): 98.1 (17 Dec 2021 00:12), Max: 98.2 (16 Dec 2021 15:34)  HR: 73 (17 Dec 2021 01:00) (64 - 133)  BP: 133/56 (17 Dec 2021 01:00) (95/54 - 200/81)  BP(mean): 79 (17 Dec 2021 01:00) (64 - 109)  RR: 21 (17 Dec 2021 01:00) (18 - 30)  SpO2: 99% (17 Dec 2021 01:00) (92% - 100%)    I&O's Detail    15 Dec 2021 07:01  -  16 Dec 2021 07:00  --------------------------------------------------------  IN:    Enteral Tube Flush: 115 mL    Glucerna 1.5: 1100 mL    IV PiggyBack: 200 mL    Midazolam: 37.9 mL  Total IN: 1452.9 mL  OUT:  Total OUT: 0 mL    Total NET: 1452.9 mL    16 Dec 2021 07:01  -  17 Dec 2021 01:52  --------------------------------------------------------  IN:    Enteral Tube Flush: 425 mL    Glucerna 1.5: 850 mL    Midazolam: 10 mL  Total IN: 1285 mL    OUT:    Voided (mL): 400 mL  Total OUT: 400 mL    Total NET: 885 mL    LABS:    12-15    143  |  108  |  25<H>  ----------------------------<  158<H>  4.7   |  23  |  1.10    Ca    8.5      15 Dec 2021 07:27  Phos  3.4     12-15  Mg     2.0     12-15    TPro  6.1  /  Alb  2.2<L>  /  TBili  0.5  /  DBili  x   /  AST  21  /  ALT  21  /  AlkPhos  133<H>  12-15    POCT Blood Glucose.: 156 mg/dL (17 Dec 2021 00:11)    CULTURES:  Culture Results:   No growth to date. (12-14 @ 22:29)  Culture Results:   Few Pseudomonas aeruginosa (Carbapenem Resistant)  Moderate Serratia marcescens  Moderate Stenotrophomonas maltophilia  Normal Respiratory Elvira present (12-11 @ 00:41)    Physical Examination:  General: No acute distress.    HEENT: Pupils equal, reactive to light.  Symmetric.  PULM: Clear to auscultation bilaterally, no significant sputum production  NECK: Supple, no lymphadenopathy, trachea midline  CVS: Regular rate and rhythm, no murmurs, rubs, or gallops  ABD: Soft, nondistended, nontender, normoactive bowel sounds, no masses  EXT: No edema, nontender  SKIN: Warm and well perfused, no rashes noted.  NEURO: On mechanical ventilation and sedated   RADIOLOGY:   < from: Xray Chest 1 View- PORTABLE-Routine (Xray Chest 1 View- PORTABLE-Routine in AM.) (12.15.21 @ 06:56) >    ACC: [id number] EXAM:  XR CHEST PORTABLE ROUTINE 1V                          PROCEDURE DATE:  12/15/2021      INTERPRETATION:  Chest one view    HISTORY: Pneumonia    COMPARISON STUDY: 12/14/2021    Frontal expiratory view of the chest shows the heart to be normal in   size. Tracheostomy tube is again noted. Distended air-filled structure in   the lower neck likely indicates distended esophagus.    The lungs show slight clearing of the lungs with reduction of left   effusion and there is no evidence of pneumothorax nor right pleural   effusion. Incidentally noted is surgical hardware in the patient's   overlying left forearm.    IMPRESSION:  Partial clearing of the lungs. Distended upper esophagus. Clinical   correlation is suggested.    Thank you for the courtesy of this referral.    --- End of Report ---    ELLE PATTEN MD; Attending Interventional Radiologist  This document has been electronically signed. Dec 16 2021 11:26AM    < end of copied text >

## 2021-12-16 NOTE — PROGRESS NOTE ADULT - SUBJECTIVE AND OBJECTIVE BOX
BREA BECKHAM    [facility]    Allergies    codeine (Hives)    Intolerances        PAST MEDICAL & SURGICAL HISTORY:  Dementia of frontal lobe type    Aphasic stroke    Diabetes mellitus    Respiratory failure    Hypertension    GERD (gastroesophageal reflux disease)    Constipation    Respiratory failure    CVA (cerebral vascular accident)    HTN (hypertension)    DM (diabetes mellitus)    Advanced dementia    COVID-19 virus detected    Quadriplegia    Pneumonia    Type II diabetes mellitus    Hx of appendectomy    Gastrostomy in place    Tracheostomy in place    Tracheostomy tube present    Feeding by G-tube        FAMILY HISTORY:  No pertinent family history in first degree relatives        Home Medications:  albuterol 90 mcg/inh inhalation aerosol: 2 puff(s) inhaled every 6 hours (08 Dec 2021 16:51)  Bacid (LAC) oral tablet: 2 tab(s) by gastrostomy tube once a day (08 Dec 2021 16:51)  Carafate 1 g/10 mL oral suspension: 10 milliliter(s) by gastrostomy tube 4 times a day (before meals and at bedtime) for 14 days (Started 6/4/21) (08 Dec 2021 16:51)  chlorhexidine 0.12% mucous membrane liquid: 15 milliliter(s) mucous membrane 2 times a day (08 Dec 2021 16:51)  insulin lispro 100 units/mL injectable solution: injectable 3 times a day ; sliding scale coverage  (14 Dec 2021 10:38)  ipratropium-albuterol 0.5 mg-2.5 mg/3 mL inhalation solution: 3 milliliter(s) inhaled 4 times a day (08 Dec 2021 16:51)  LORazepam 1 mg oral tablet: 1 tab(s) by gastrostomy tube 3 times a day, As Needed (08 Dec 2021 16:51)  methocarbamol 500 mg oral tablet: 1 tab(s) orally 2 times a day (14 Dec 2021 19:18)  pantoprazole 40 mg oral granule, delayed release: 40 milligram(s) by gastrostomy tube 2 times a day (08 Dec 2021 16:51)  piperacillin-tazobactam 3 g-0.375 g/50 mL intravenous solution: intravenous every 8 hours (14 Dec 2021 19:18)  polyethylene glycol 3350 oral powder for reconstitution: 17 gram(s) orally every 12 hours (08 Dec 2021 16:51)  ProAir HFA 90 mcg/inh inhalation aerosol: 2 puff(s) inhaled every 6 hours (08 Dec 2021 16:51)  senna 8.6 mg oral tablet: 3 tab(s) by gastrostomy tube once a day (at bedtime) (08 Dec 2021 16:51)  simethicone 80 mg oral tablet, chewable: 1 tab(s) orally every 6 hours (08 Dec 2021 16:51)  Tylenol 325 mg oral tablet: 2 tab(s) by gastrostomy tube once a day; 60 minutes prior to dressing change  (08 Dec 2021 16:51)      MEDICATIONS  (STANDING):  ALBUTerol    90 MICROgram(s) HFA Inhaler 2 Puff(s) Inhalation every 6 hours  chlorhexidine 0.12% Liquid 15 milliLiter(s) Oral Mucosa every 12 hours  dextrose 40% Gel 15 Gram(s) Oral once  dextrose 5%. 1000 milliLiter(s) (50 mL/Hr) IV Continuous <Continuous>  dextrose 5%. 1000 milliLiter(s) (100 mL/Hr) IV Continuous <Continuous>  dextrose 50% Injectable 25 Gram(s) IV Push once  dextrose 50% Injectable 12.5 Gram(s) IV Push once  dextrose 50% Injectable 25 Gram(s) IV Push once  glucagon  Injectable 1 milliGRAM(s) IntraMuscular once  heparin   Injectable 5000 Unit(s) SubCutaneous every 12 hours  insulin lispro (ADMELOG) corrective regimen sliding scale   SubCutaneous every 6 hours  methocarbamol 500 milliGRAM(s) Oral two times a day  midazolam Infusion 0.02 mG/kG/Hr (0.89 mL/Hr) IV Continuous <Continuous>  pantoprazole  Injectable 40 milliGRAM(s) IV Push every 12 hours  piperacillin/tazobactam IVPB.. 3.375 Gram(s) IV Intermittent every 8 hours  polyethylene glycol 3350 17 Gram(s) Oral every 12 hours  senna 2 Tablet(s) Oral at bedtime  simethicone 80 milliGRAM(s) Chew every 6 hours  sucralfate suspension 1 Gram(s) Enteral Tube every 6 hours    MEDICATIONS  (PRN):  acetaminophen     Tablet .. 650 milliGRAM(s) Oral every 6 hours PRN Temp greater or equal to 38C (100.4F), Mild Pain (1 - 3)  midazolam Injectable 2 milliGRAM(s) IV Push every 2 hours PRN Seizure activity      Diet, NPO with Tube Feed:   Tube Feeding Modality: Gastrostomy  Glucerna 1.5 Horacio  Total Volume for 24 Hours (mL): 1000  Continuous  Starting Tube Feed Rate mL per Hour: 20  Increase Tube Feed Rate by (mL): 10     Every 6 hours  Until Goal Tube Feed Rate (mL per Hour): 50  Tube Feed Duration (in Hours): 20  Tube Feed Start Time: 17:00 (12-09-21 @ 16:49) [Active]          Vital Signs Last 24 Hrs  T(C): 36.6 (16 Dec 2021 08:30), Max: 37 (15 Dec 2021 12:00)  T(F): 97.9 (16 Dec 2021 08:30), Max: 98.6 (15 Dec 2021 12:00)  HR: 75 (16 Dec 2021 11:00) (57 - 133)  BP: 128/65 (16 Dec 2021 11:00) (95/54 - 203/65)  BP(mean): 85 (16 Dec 2021 11:00) (64 - 109)  RR: 18 (16 Dec 2021 11:00) (17 - 38)  SpO2: 99% (16 Dec 2021 11:00) (87% - 100%)      12-15-21 @ 07:01  -  12-16-21 @ 07:00  --------------------------------------------------------  IN: 1452.9 mL / OUT: 0 mL / NET: 1452.9 mL        Mode: AC/ CMV (Assist Control/ Continuous Mandatory Ventilation), RR (machine): 18, TV (machine): 400, FiO2: 40, PEEP: 5, ITime: 1, MAP: 9, PIP: 32      LABS:                        9.6    17.35 )-----------( 332      ( 14 Dec 2021 13:58 )             32.9     12-15    143  |  108  |  25<H>  ----------------------------<  158<H>  4.7   |  23  |  1.10    Ca    8.5      15 Dec 2021 07:27  Phos  3.4     12-15  Mg     2.0     12-15    TPro  6.1  /  Alb  2.2<L>  /  TBili  0.5  /  DBili  x   /  AST  21  /  ALT  21  /  AlkPhos  133<H>  12-15              WBC:  WBC Count: 17.35 K/uL (12-14 @ 13:58)  WBC Count: 5.60 K/uL (12-13 @ 06:20)      MICROBIOLOGY:  RECENT CULTURES:  12-14 .Blood Blood XXXX XXXX   No growth to date.    12-11 .Sputum Sputum Serratia marcescens  Pseudomonas aeruginosa (Carbapenem Resistant)  Stenotrophomonas maltophilia   Moderate polymorphonuclear leukocytes per low power field  Rare Squamous epithelial cells per low power field  Numerous Gram Negative Rods seen per oil power field   Few Pseudomonas aeruginosa (Carbapenem Resistant)  Moderate Serratia marcescens  Moderate Stenotrophomonas maltophilia  Normal Respiratory Elvira present                    Sodium:  Sodium, Serum: 143 mmol/L (12-15 @ 07:27)  Sodium, Serum: 142 mmol/L (12-14 @ 15:28)  Sodium, Serum: 142 mmol/L (12-13 @ 06:20)      1.10 mg/dL 12-15 @ 07:27  1.20 mg/dL 12-14 @ 15:28  1.03 mg/dL 12-13 @ 06:20      Hemoglobin:  Hemoglobin: 9.6 g/dL (12-14 @ 13:58)  Hemoglobin: 8.9 g/dL (12-13 @ 06:20)      Platelets: Platelet Count - Automated: 332 K/uL (12-14 @ 13:58)  Platelet Count - Automated: 236 K/uL (12-13 @ 06:20)      LIVER FUNCTIONS - ( 15 Dec 2021 07:27 )  Alb: 2.2 g/dL / Pro: 6.1 g/dL / ALK PHOS: 133 U/L / ALT: 21 U/L DA / AST: 21 U/L / GGT: x                 RADIOLOGY & ADDITIONAL STUDIES:      MICROBIOLOGY:  RECENT CULTURES:  12-14 .Blood Blood XXXX XXXX   No growth to date.    12-11 .Sputum Sputum Serratia marcescens  Pseudomonas aeruginosa (Carbapenem Resistant)  Stenotrophomonas maltophilia   Moderate polymorphonuclear leukocytes per low power field  Rare Squamous epithelial cells per low power field  Numerous Gram Negative Rods seen per oil power field   Few Pseudomonas aeruginosa (Carbapenem Resistant)  Moderate Serratia marcescens  Moderate Stenotrophomonas maltophilia  Normal Respiratory Elvira present

## 2021-12-16 NOTE — PROGRESS NOTE ADULT - ASSESSMENT
CHAPINCITO GUDIRY 75 f [facility] S 12/8/2021   DR ILIANA KEMP     REVIEW OF SYMPTOMS      Able to give (reliable) ROS  NO     PHYSICAL EXAM    HEENT Unremarkable  atraumatic   RESP Fair air entry EXP prolonged    Harsh breath sound Resp distres mild   CARDIAC S1 S2 No S3     NO JVD    ABDOMEN SOFT BS PRESENT NOT DISTENDED No hepatosplenomegaly   PEDAL EDEMA present No calf tenderness  NO rash       ________________  Age DOA CC.  76 year old female with a history of Pneumonia  HTN, DM, CVA, dementia, chronic respiratory failure s/p trach, PEG, cardiac arrest who presented 12/8/2021 from NH with abnormal labs. She was found to have a hemoglobin of 6. No further history could be obtained from patient.  Pulm crit care consulted 12/8/2021     _____                            GOC.12/8/2021 Full code   COVID STATUS. 12/8/2021 scv2 (-)   ICU STAY. Admitted ICU 12/8/2021   ABIO.   12/13/2021 Levaquin 750 1 dose Dr Medellin  (12/11 sp stenotrophomonas)   12/14 zosyn x 7d Dr Tay     PATIENT DATA   VITALS/PO/IO/VENT/DRIPS.   12/16/2021 afeb 70 140/60   12/16/2021 ac 18/400/5/100      BEST PRACTICE ISSUES.                                                  HEAD OF BED ELEVATION. Yes  DVT PROPHYLAXIS.      [facility]   12/8/2021 No pharmac pplx as pt possibly has abla on admission 12/8/2021                                    SQUIRES PROPHYLAXIS.      12/8/2021 IV protonix 40.2       12/9/2021 carafate 1.4                                                  DIET.            12/9/2021 glucerna 1.5 1000 12/9 12/8/2021 npo till gi bleed ruled out              INFECTION PROPHYLAXIS.    12/8/2021 chlorhexidine 0.12% bid     GENERAL ISSUES                                       ALLERGY.         codeine                   WEIGHT.           12/8/2021 61                           BMI.                   12/8/2021 22  SPEECH SWALLOW RECOMMENDATIONS.      ASSESSMENT/RECOMMENDATIONS.    RESP.   Trach  SP Trach pmh     VENT DYSSYNCHRONY NOTED 12/14/2021 .  12/14/2021 Fentanyl    12/14/2021 Versed            COPD pmh.   12/9/2021 albuterol hf.4   12/9/2021 Spiriva   Cont Rx      VENT MANAGEMENT.  VENT DEPENDENT RESP FAILURE pmh.  Requiring 100% O2  Will check cxr and abg 12/17 am   12/16/2021 Will check V duplx     HEMODYNAMICS.   HYPOTENSION poa.   9/8/2021 ECHO n lvsf mild dd pasp 51   BP has improvd   target map 65 (+)       INFECTION.   Pneumonia  poa 12/8/2021   w 12/9-12/11-12/14/2021 w 8.8 - 6.9-17   12/9 pr 6.7   CXR 12/14/2021 bl opac poss effsns maddie l side   CXR 12/8 widespread airspace dis sl improvd cw 11/9/2021 12/8 ct cap distal airway impaction and mf pneum related to aspirat   Changes are either new or unchanged   Volume loss rll has improvd since 11/7/2021   Small l effsn     12/8 mrsa (-)   12/8 blod c (-)  12/8 urine c (-)   12/11 sputum stenotrophomonas levaq sens  12/11 sputum CRP Pseudomonas  12/11 sputu serratia   12/13/2021 Levaquin 750 1 dose Dr Medellin  12/14 zosyn x 7d  Na    SEVERE ANEMIA poa   Hb 12/8-12/8-12/9-12/11-12/12-12/13-12/14/2021   Hb 5.2 -9.7-8.6 - 9.4-9.1-8.9 -9.6  Hb stable     GERD pmh.  On ppi     sp PEG pmh    GI BLEED poa 12/8 12/9/2021 SOB (+)   12/9/2021 Seen by Dr Nieves No intervention pplannd   12/8/2021 IV protonix 40.2       12/9/2021 carafate 1.4                12/9/2021 Dr BETH pittayed dvt pplx [facility]   12/11/2021 gi not planning any pproced      SEIZURES SPASMS pmh   Takes lorazepam 1.3   12/14/2021 plan is to send to [facility] for eeg monitoring  12/14 METHOCARBAMOL 500.2   12/14 MIDAZOLAM 0.02 m/k/h       OVERALL ISSUES  76 full code Anoxic encephalopathy vdrf peg trach patient age   VENT DYSSYNCHRONY Prolonged episode 12/14/2021   ANEMIA Monitor Hb   PNEUM zosyn (crp) One dose levaq 12/13 (steno)  HYPONA Monitor   SEIZURES 12/14/2021   POOR IV ACCESS 12/10/2021 For midline   HYPOXIA Check cxr V duplx     TIME SPENT   Over 36 minutes aggregate critical care time spent on encounter; activities included   direct patient care, counseling and/or coordinating care reviewing notes, lab data/ imaging , discussion with multidisciplinary team/ patient  /family and explaining in detail risks, benefits, alternatives  of the recommendations     CHAPINCITO GUIDRY 75 f [facility] S 12/8/2021   DR ILIANA KEMP

## 2021-12-16 NOTE — PROGRESS NOTE ADULT - SUBJECTIVE AND OBJECTIVE BOX
Subjective: Remains intubated and sedated.     MEDICATIONS  (STANDING):  ALBUTerol    90 MICROgram(s) HFA Inhaler 2 Puff(s) Inhalation every 6 hours  chlorhexidine 0.12% Liquid 15 milliLiter(s) Oral Mucosa every 12 hours  dextrose 40% Gel 15 Gram(s) Oral once  dextrose 5%. 1000 milliLiter(s) (50 mL/Hr) IV Continuous <Continuous>  dextrose 5%. 1000 milliLiter(s) (100 mL/Hr) IV Continuous <Continuous>  dextrose 50% Injectable 25 Gram(s) IV Push once  dextrose 50% Injectable 12.5 Gram(s) IV Push once  dextrose 50% Injectable 25 Gram(s) IV Push once  glucagon  Injectable 1 milliGRAM(s) IntraMuscular once  heparin   Injectable 5000 Unit(s) SubCutaneous every 12 hours  insulin lispro (ADMELOG) corrective regimen sliding scale   SubCutaneous every 6 hours  methocarbamol 500 milliGRAM(s) Oral two times a day  midazolam Infusion 0.02 mG/kG/Hr (0.89 mL/Hr) IV Continuous <Continuous>  pantoprazole  Injectable 40 milliGRAM(s) IV Push every 12 hours  piperacillin/tazobactam IVPB.. 3.375 Gram(s) IV Intermittent every 8 hours  polyethylene glycol 3350 17 Gram(s) Oral every 12 hours  senna 2 Tablet(s) Oral at bedtime  simethicone 80 milliGRAM(s) Chew every 6 hours  sucralfate suspension 1 Gram(s) Enteral Tube every 6 hours    MEDICATIONS  (PRN):  acetaminophen     Tablet .. 650 milliGRAM(s) Oral every 6 hours PRN Temp greater or equal to 38C (100.4F), Mild Pain (1 - 3)  midazolam Injectable 2 milliGRAM(s) IV Push every 2 hours PRN Seizure activity      Allergies    codeine (Hives)    Intolerances        Vital Signs Last 24 Hrs  T(C): 36.6 (16 Dec 2021 08:30), Max: 36.8 (15 Dec 2021 19:29)  T(F): 97.9 (16 Dec 2021 08:30), Max: 98.2 (15 Dec 2021 19:29)  HR: 75 (16 Dec 2021 11:00) (57 - 133)  BP: 128/65 (16 Dec 2021 11:00) (95/54 - 203/65)  BP(mean): 85 (16 Dec 2021 11:00) (64 - 109)  RR: 18 (16 Dec 2021 11:00) (17 - 38)  SpO2: 99% (16 Dec 2021 11:00) (87% - 100%)    PHYSICAL EXAM:  GENERAL: No Distress; Sedated and Trach to Vent   HEAD:  Atraumatic, Normocephalic  ENMT: Moist mucous membranes,   NECK: Trach to Vent  CHEST/LUNG: Coarse BS Bilaterally   HEART: Regular rate and rhythm; No murmurs, rubs, or gallops  ABDOMEN: Soft; + PEG TUBE   EXTREMITIES:  2+ Peripheral Pulses, No clubbing, cyanosis, or edema      LABS:      Ca    8.5        15 Dec 2021 07:27          CAPILLARY BLOOD GLUCOSE      POCT Blood Glucose.: 158 mg/dL (16 Dec 2021 11:49)  POCT Blood Glucose.: 182 mg/dL (16 Dec 2021 06:01)  POCT Blood Glucose.: 171 mg/dL (15 Dec 2021 23:29)  POCT Blood Glucose.: 156 mg/dL (15 Dec 2021 17:58)      RADIOLOGY & ADDITIONAL TESTS:    Imaging Personally Reviewed:  [ ] YES     Consultant(s) Notes Reviewed:      Care Discussed with Consultants/Other Providers:    Advanced Directives: [ ] DNR  [ ] No feeding tube  [ ] MOLST in chart  [ ] MOLST completed today  [ ] Unknown

## 2021-12-16 NOTE — PROGRESS NOTE ADULT - ASSESSMENT
75F with chronic resp failure - vent dependent, hx of subarachnoid hemorrhage, hx of cardiac arrest, dementia s/p PEG, HTN, DM2 on insulin, hx of CVA, GERD, COPD, admission here from 9/25 to 10/4 and 11/7-11/11 for respiratory failure/sepsis possibly 2/2 aspiration vs vent associated PNA who presents from [facility] w leukocytosis of 12.18, and concerns for repeat aspiration PNA    CT-Distal airways impaction and multifocal pneumonia, possibly related to aspiration. Most of the opacities are either new or unchanged since prior study.     RECOMMENDATIONS  respiratory culture w/ pseudomonas, Stenotrophomonas and Serratia marcescens  s/p 6 day course of zosyn  has clinically improved despite zosyn only covering pseudomonas and serratia but not stenotrophomonas therefore likely colonization and not infection & therefore will not adjust therapy to cover this (also would involve large doses of bactrim & pt w/ CKD)  s/p levofloxacin 750mg IV x 1 on 12/13  pt w/ seizure like activity 12/14, now planned for transfer to [place] for continuous EEG monitoring  leukocytosis possible reactive 2/2 seizure vs aspiration pneumonia or pneumonitis during seizure  CXR 12/14 w/ increased bibasilar infiltrates compared with 12/8  vettings back to FiO2 of 40%   c/w zosyn for now  f/u blood cultures  CXR w/ improved aeration of L lung, b/l lower lobe opacification; suspect progression of prior aspiration PNA which will take time to resolve on imaging vs pulmonary edema given rapid improvement on CXR  f/u CXR official read  If blood cx neg x 48 hours (tomorrow) will likely plan to discontinue zosyn  monitor temps, wbc    Emil Medellin M.D.  [phone]  [facility], Division of Infectious Diseases  [phone]  After 5pm on weekdays and all day on weekends - please call [phone]

## 2021-12-16 NOTE — PROGRESS NOTE ADULT - ASSESSMENT
Assessment:  76 y/o F w/ a PMHx of chronic respiratory failure s/p trach/PEG, subarachnoid hemorrhage, HTN, DM type 2, GERD, and dementia admitted w/:    Problem List:  1. Anemia  2. RYAN, resolved  3. Seizure  4. Aspiration pneumonia  5. Acute on chronic hypoxic respiratory failure     Plan:  Neuro: Monitor mental status and monitor for any seizure like activity. C/w ativan 1mg q4h. Versed prn seizure like activity. Patient awaiting bed for cvEEG. Neuro on board  CV: Hemodynamically stable. Goal MAP>65mmHg. Not requiring vasopressors currently   Pulm: Acute hypoxic resp failure 2/2 aspiration pna. Patient currently on Full vent support. Titrate settings to maintain SaO2 >90%, or pH >7.25. Consider low tidal volume ventilation strategy w/ goal Tv 4-6 cc/kg of ideal body weight. Plateu pressure goal <30. Peridex oral care. Aggressive pulmonary toilet   Renal: RYAN resolved. Strict I/Os, goal UOP >0.5cc/kg/hr. Trend renal function and electrolytes, replete as needed. Avoid nephrotoxic agents  GI: Tube feeds, bowel regiment, PPI  ID: Carbapenem resistant pseudomonas, serratia, and steno. C/w Zosyn. F/u cultures. Trend wbc/fevers/procalcitonin. ID on board  Heme: LE duplex negative. On heparin subq for DVT prophylaxis. Anemia somewhat improved. Trend CBC   Endo: Goal blood glucose <180. C/w ISS

## 2021-12-17 LAB
ALBUMIN SERPL ELPH-MCNC: 2.2 G/DL — LOW (ref 3.3–5)
ALP SERPL-CCNC: 132 U/L — HIGH (ref 30–120)
ALT FLD-CCNC: 16 U/L DA — SIGNIFICANT CHANGE UP (ref 10–60)
ANION GAP SERPL CALC-SCNC: 9 MMOL/L — SIGNIFICANT CHANGE UP (ref 5–17)
AST SERPL-CCNC: 13 U/L — SIGNIFICANT CHANGE UP (ref 10–40)
BASE EXCESS BLDA CALC-SCNC: 0.4 MMOL/L — SIGNIFICANT CHANGE UP (ref -2–3)
BILIRUB SERPL-MCNC: 0.4 MG/DL — SIGNIFICANT CHANGE UP (ref 0.2–1.2)
BLOOD GAS COMMENTS ARTERIAL: SIGNIFICANT CHANGE UP
BLOOD GAS COMMENTS ARTERIAL: SIGNIFICANT CHANGE UP
BUN SERPL-MCNC: 20 MG/DL — SIGNIFICANT CHANGE UP (ref 7–23)
CALCIUM SERPL-MCNC: 8.9 MG/DL — SIGNIFICANT CHANGE UP (ref 8.4–10.5)
CHLORIDE SERPL-SCNC: 105 MMOL/L — SIGNIFICANT CHANGE UP (ref 96–108)
CO2 SERPL-SCNC: 24 MMOL/L — SIGNIFICANT CHANGE UP (ref 22–31)
CREAT SERPL-MCNC: 0.88 MG/DL — SIGNIFICANT CHANGE UP (ref 0.5–1.3)
GAS PNL BLDA: SIGNIFICANT CHANGE UP
GLUCOSE BLDC GLUCOMTR-MCNC: 149 MG/DL — HIGH (ref 70–99)
GLUCOSE BLDC GLUCOMTR-MCNC: 150 MG/DL — HIGH (ref 70–99)
GLUCOSE BLDC GLUCOMTR-MCNC: 156 MG/DL — HIGH (ref 70–99)
GLUCOSE BLDC GLUCOMTR-MCNC: 168 MG/DL — HIGH (ref 70–99)
GLUCOSE BLDC GLUCOMTR-MCNC: 171 MG/DL — HIGH (ref 70–99)
GLUCOSE SERPL-MCNC: 158 MG/DL — HIGH (ref 70–99)
HCO3 BLDA-SCNC: 24 MMOL/L — SIGNIFICANT CHANGE UP (ref 21–28)
HCT VFR BLD CALC: 34 % — LOW (ref 34.5–45)
HGB BLD-MCNC: 10.2 G/DL — LOW (ref 11.5–15.5)
HOROWITZ INDEX BLDA+IHG-RTO: 40 — SIGNIFICANT CHANGE UP
MAGNESIUM SERPL-MCNC: 1.9 MG/DL — SIGNIFICANT CHANGE UP (ref 1.6–2.6)
MCHC RBC-ENTMCNC: 25.2 PG — LOW (ref 27–34)
MCHC RBC-ENTMCNC: 30 GM/DL — LOW (ref 32–36)
MCV RBC AUTO: 84.2 FL — SIGNIFICANT CHANGE UP (ref 80–100)
NRBC # BLD: 0 /100 WBCS — SIGNIFICANT CHANGE UP (ref 0–0)
PCO2 BLDA: 34 MMHG — SIGNIFICANT CHANGE UP (ref 32–35)
PH BLDA: 7.46 — HIGH (ref 7.35–7.45)
PHOSPHATE SERPL-MCNC: 2.6 MG/DL — SIGNIFICANT CHANGE UP (ref 2.5–4.5)
PLATELET # BLD AUTO: 336 K/UL — SIGNIFICANT CHANGE UP (ref 150–400)
PO2 BLDA: 104 MMHG — SIGNIFICANT CHANGE UP (ref 83–108)
POTASSIUM SERPL-MCNC: 4.7 MMOL/L — SIGNIFICANT CHANGE UP (ref 3.5–5.3)
POTASSIUM SERPL-SCNC: 4.7 MMOL/L — SIGNIFICANT CHANGE UP (ref 3.5–5.3)
PROT SERPL-MCNC: 7 G/DL — SIGNIFICANT CHANGE UP (ref 6–8.3)
RBC # BLD: 4.04 M/UL — SIGNIFICANT CHANGE UP (ref 3.8–5.2)
RBC # FLD: 17.2 % — HIGH (ref 10.3–14.5)
SAO2 % BLDA: 98.5 % — HIGH (ref 94–98)
SODIUM SERPL-SCNC: 138 MMOL/L — SIGNIFICANT CHANGE UP (ref 135–145)
WBC # BLD: 7.94 K/UL — SIGNIFICANT CHANGE UP (ref 3.8–10.5)
WBC # FLD AUTO: 7.94 K/UL — SIGNIFICANT CHANGE UP (ref 3.8–10.5)

## 2021-12-17 PROCEDURE — 71045 X-RAY EXAM CHEST 1 VIEW: CPT | Mod: 26

## 2021-12-17 PROCEDURE — 99233 SBSQ HOSP IP/OBS HIGH 50: CPT

## 2021-12-17 RX ADMIN — ALBUTEROL 2 PUFF(S): 90 AEROSOL, METERED ORAL at 11:32

## 2021-12-17 RX ADMIN — PIPERACILLIN AND TAZOBACTAM 25 GRAM(S): 4; .5 INJECTION, POWDER, LYOPHILIZED, FOR SOLUTION INTRAVENOUS at 21:35

## 2021-12-17 RX ADMIN — Medication 1 GRAM(S): at 23:38

## 2021-12-17 RX ADMIN — SIMETHICONE 80 MILLIGRAM(S): 80 TABLET, CHEWABLE ORAL at 17:02

## 2021-12-17 RX ADMIN — CHLORHEXIDINE GLUCONATE 15 MILLILITER(S): 213 SOLUTION TOPICAL at 17:02

## 2021-12-17 RX ADMIN — Medication 1 GRAM(S): at 11:55

## 2021-12-17 RX ADMIN — PANTOPRAZOLE SODIUM 40 MILLIGRAM(S): 20 TABLET, DELAYED RELEASE ORAL at 17:01

## 2021-12-17 RX ADMIN — SENNA PLUS 2 TABLET(S): 8.6 TABLET ORAL at 21:35

## 2021-12-17 RX ADMIN — POLYETHYLENE GLYCOL 3350 17 GRAM(S): 17 POWDER, FOR SOLUTION ORAL at 17:02

## 2021-12-17 RX ADMIN — METHOCARBAMOL 500 MILLIGRAM(S): 500 TABLET, FILM COATED ORAL at 06:53

## 2021-12-17 RX ADMIN — Medication 2: at 17:19

## 2021-12-17 RX ADMIN — PIPERACILLIN AND TAZOBACTAM 25 GRAM(S): 4; .5 INJECTION, POWDER, LYOPHILIZED, FOR SOLUTION INTRAVENOUS at 06:50

## 2021-12-17 RX ADMIN — SIMETHICONE 80 MILLIGRAM(S): 80 TABLET, CHEWABLE ORAL at 06:50

## 2021-12-17 RX ADMIN — SIMETHICONE 80 MILLIGRAM(S): 80 TABLET, CHEWABLE ORAL at 01:00

## 2021-12-17 RX ADMIN — Medication 2: at 00:40

## 2021-12-17 RX ADMIN — Medication 2: at 07:00

## 2021-12-17 RX ADMIN — Medication 1 MILLIGRAM(S): at 02:22

## 2021-12-17 RX ADMIN — Medication 1 GRAM(S): at 00:57

## 2021-12-17 RX ADMIN — METHOCARBAMOL 500 MILLIGRAM(S): 500 TABLET, FILM COATED ORAL at 17:01

## 2021-12-17 RX ADMIN — Medication 1 GRAM(S): at 17:02

## 2021-12-17 RX ADMIN — POLYETHYLENE GLYCOL 3350 17 GRAM(S): 17 POWDER, FOR SOLUTION ORAL at 06:52

## 2021-12-17 RX ADMIN — ALBUTEROL 2 PUFF(S): 90 AEROSOL, METERED ORAL at 19:43

## 2021-12-17 RX ADMIN — Medication 1 MILLIGRAM(S): at 06:50

## 2021-12-17 RX ADMIN — HEPARIN SODIUM 5000 UNIT(S): 5000 INJECTION INTRAVENOUS; SUBCUTANEOUS at 06:50

## 2021-12-17 RX ADMIN — PIPERACILLIN AND TAZOBACTAM 25 GRAM(S): 4; .5 INJECTION, POWDER, LYOPHILIZED, FOR SOLUTION INTRAVENOUS at 13:17

## 2021-12-17 RX ADMIN — Medication 1 MILLIGRAM(S): at 17:23

## 2021-12-17 RX ADMIN — PANTOPRAZOLE SODIUM 40 MILLIGRAM(S): 20 TABLET, DELAYED RELEASE ORAL at 07:00

## 2021-12-17 RX ADMIN — Medication 1 MILLIGRAM(S): at 13:17

## 2021-12-17 RX ADMIN — Medication 1 GRAM(S): at 06:50

## 2021-12-17 RX ADMIN — Medication 1 MILLIGRAM(S): at 21:35

## 2021-12-17 RX ADMIN — SIMETHICONE 80 MILLIGRAM(S): 80 TABLET, CHEWABLE ORAL at 11:55

## 2021-12-17 RX ADMIN — SIMETHICONE 80 MILLIGRAM(S): 80 TABLET, CHEWABLE ORAL at 23:38

## 2021-12-17 RX ADMIN — ALBUTEROL 2 PUFF(S): 90 AEROSOL, METERED ORAL at 01:15

## 2021-12-17 RX ADMIN — CHLORHEXIDINE GLUCONATE 15 MILLILITER(S): 213 SOLUTION TOPICAL at 06:51

## 2021-12-17 RX ADMIN — ALBUTEROL 2 PUFF(S): 90 AEROSOL, METERED ORAL at 06:30

## 2021-12-17 RX ADMIN — HEPARIN SODIUM 5000 UNIT(S): 5000 INJECTION INTRAVENOUS; SUBCUTANEOUS at 17:01

## 2021-12-17 RX ADMIN — Medication 1 MILLIGRAM(S): at 09:56

## 2021-12-17 RX ADMIN — MIDAZOLAM HYDROCHLORIDE 2 MILLIGRAM(S): 1 INJECTION, SOLUTION INTRAMUSCULAR; INTRAVENOUS at 17:02

## 2021-12-17 NOTE — PROGRESS NOTE ADULT - ASSESSMENT
This is a 76-year-old with h/o acrdiac arrest, Trach and PEG episodes of altered mental status and a history of shaking.  Metabolic encephalopathy secondary to underlying infectious type process and possibly underlying pneumonia.  For episodes of abnormal movements, as per my conversation with the spouse in the past, these were nonepileptic in nature.  As per my conversation with him in the past, does not want any antiseizure medications but does want EEG monitoring now - which is not available in [facility].   Spouse's name is Marciano, his telephone number is [phone] 12/16  Supportive care.  Poor quality of life.  Poor prognosis.    Greater than 30 minutes of time was spent with the patient, plan of care, reviewing data, and speaking to the multidisciplinary healthcare team with greater than 50% of that time in counseling and care coordination.

## 2021-12-17 NOTE — PROGRESS NOTE ADULT - SUBJECTIVE AND OBJECTIVE BOX
BREA BECKHAM    [facility]    Allergies    codeine (Hives)    Intolerances        PAST MEDICAL & SURGICAL HISTORY:  Dementia of frontal lobe type    Aphasic stroke    Diabetes mellitus    Respiratory failure    Hypertension    GERD (gastroesophageal reflux disease)    Constipation    Respiratory failure    CVA (cerebral vascular accident)    HTN (hypertension)    DM (diabetes mellitus)    Advanced dementia    COVID-19 virus detected    Quadriplegia    Pneumonia    Type II diabetes mellitus    Hx of appendectomy    Gastrostomy in place    Tracheostomy in place    Tracheostomy tube present    Feeding by G-tube        FAMILY HISTORY:  No pertinent family history in first degree relatives        Home Medications:  albuterol 90 mcg/inh inhalation aerosol: 2 puff(s) inhaled every 6 hours (08 Dec 2021 16:51)  Bacid (LAC) oral tablet: 2 tab(s) by gastrostomy tube once a day (08 Dec 2021 16:51)  Carafate 1 g/10 mL oral suspension: 10 milliliter(s) by gastrostomy tube 4 times a day (before meals and at bedtime) for 14 days (Started 6/4/21) (08 Dec 2021 16:51)  chlorhexidine 0.12% mucous membrane liquid: 15 milliliter(s) mucous membrane 2 times a day (08 Dec 2021 16:51)  insulin lispro 100 units/mL injectable solution: injectable 3 times a day ; sliding scale coverage  (14 Dec 2021 10:38)  ipratropium-albuterol 0.5 mg-2.5 mg/3 mL inhalation solution: 3 milliliter(s) inhaled 4 times a day (08 Dec 2021 16:51)  LORazepam 1 mg oral tablet: 1 tab(s) by gastrostomy tube 3 times a day, As Needed (08 Dec 2021 16:51)  methocarbamol 500 mg oral tablet: 1 tab(s) orally 2 times a day (14 Dec 2021 19:18)  pantoprazole 40 mg oral granule, delayed release: 40 milligram(s) by gastrostomy tube 2 times a day (08 Dec 2021 16:51)  piperacillin-tazobactam 3 g-0.375 g/50 mL intravenous solution: intravenous every 8 hours (14 Dec 2021 19:18)  polyethylene glycol 3350 oral powder for reconstitution: 17 gram(s) orally every 12 hours (08 Dec 2021 16:51)  ProAir HFA 90 mcg/inh inhalation aerosol: 2 puff(s) inhaled every 6 hours (08 Dec 2021 16:51)  senna 8.6 mg oral tablet: 3 tab(s) by gastrostomy tube once a day (at bedtime) (08 Dec 2021 16:51)  simethicone 80 mg oral tablet, chewable: 1 tab(s) orally every 6 hours (08 Dec 2021 16:51)  Tylenol 325 mg oral tablet: 2 tab(s) by gastrostomy tube once a day; 60 minutes prior to dressing change  (08 Dec 2021 16:51)      MEDICATIONS  (STANDING):  ALBUTerol    90 MICROgram(s) HFA Inhaler 2 Puff(s) Inhalation every 6 hours  chlorhexidine 0.12% Liquid 15 milliLiter(s) Oral Mucosa every 12 hours  dextrose 40% Gel 15 Gram(s) Oral once  dextrose 5%. 1000 milliLiter(s) (50 mL/Hr) IV Continuous <Continuous>  dextrose 5%. 1000 milliLiter(s) (100 mL/Hr) IV Continuous <Continuous>  dextrose 50% Injectable 25 Gram(s) IV Push once  dextrose 50% Injectable 12.5 Gram(s) IV Push once  dextrose 50% Injectable 25 Gram(s) IV Push once  glucagon  Injectable 1 milliGRAM(s) IntraMuscular once  heparin   Injectable 5000 Unit(s) SubCutaneous every 12 hours  insulin lispro (ADMELOG) corrective regimen sliding scale   SubCutaneous every 6 hours  LORazepam     Tablet 1 milliGRAM(s) Oral every 4 hours  methocarbamol 500 milliGRAM(s) Oral two times a day  pantoprazole  Injectable 40 milliGRAM(s) IV Push every 12 hours  piperacillin/tazobactam IVPB.. 3.375 Gram(s) IV Intermittent every 8 hours  polyethylene glycol 3350 17 Gram(s) Oral every 12 hours  senna 2 Tablet(s) Oral at bedtime  simethicone 80 milliGRAM(s) Chew every 6 hours  sucralfate suspension 1 Gram(s) Enteral Tube every 6 hours    MEDICATIONS  (PRN):  acetaminophen     Tablet .. 650 milliGRAM(s) Oral every 6 hours PRN Temp greater or equal to 38C (100.4F), Mild Pain (1 - 3)  midazolam Injectable 2 milliGRAM(s) IV Push every 2 hours PRN Seizure activity      Diet, NPO with Tube Feed:   Tube Feeding Modality: Gastrostomy  Glucerna 1.5 Horacio  Total Volume for 24 Hours (mL): 1000  Continuous  Starting Tube Feed Rate mL per Hour: 20  Increase Tube Feed Rate by (mL): 10     Every 6 hours  Until Goal Tube Feed Rate (mL per Hour): 50  Tube Feed Duration (in Hours): 20  Tube Feed Start Time: 17:00 (12-09-21 @ 16:49) [Active]          Vital Signs Last 24 Hrs  T(C): 36.7 (17 Dec 2021 08:21), Max: 36.9 (17 Dec 2021 05:30)  T(F): 98.1 (17 Dec 2021 08:21), Max: 98.4 (17 Dec 2021 05:30)  HR: 96 (17 Dec 2021 08:11) (69 - 106)  BP: 145/66 (17 Dec 2021 07:00) (123/58 - 153/98)  BP(mean): 89 (17 Dec 2021 07:00) (77 - 116)  RR: 24 (17 Dec 2021 08:11) (17 - 35)  SpO2: 100% (17 Dec 2021 08:11) (95% - 100%)      12-16-21 @ 07:01  -  12-17-21 @ 07:00  --------------------------------------------------------  IN: 1610 mL / OUT: 400 mL / NET: 1210 mL        Mode: AC/ CMV (Assist Control/ Continuous Mandatory Ventilation), RR (machine): 18, TV (machine): 400, FiO2: 40, PEEP: 5, ITime: 1, MAP: 12, PIP: 34      LABS:                        10.2   7.94  )-----------( 336      ( 17 Dec 2021 07:01 )             34.0     12-17    138  |  105  |  20  ----------------------------<  158<H>  4.7   |  24  |  0.88    Ca    8.9      17 Dec 2021 07:01  Phos  2.6     12-17  Mg     1.9     12-17    TPro  7.0  /  Alb  2.2<L>  /  TBili  0.4  /  DBili  x   /  AST  13  /  ALT  16  /  AlkPhos  132<H>  12-17          ABG - ( 17 Dec 2021 05:46 )  pH, Arterial: 7.46  pH, Blood: x     /  pCO2: 34    /  pO2: 104   / HCO3: 24    / Base Excess: 0.4   /  SaO2: 98.5                WBC:  WBC Count: 7.94 K/uL (12-17 @ 07:01)  WBC Count: 17.35 K/uL (12-14 @ 13:58)      MICROBIOLOGY:  RECENT CULTURES:  12-14 .Blood Blood XXXX XXXX   No growth to date.    12-11 .Sputum Sputum Serratia marcescens  Pseudomonas aeruginosa (Carbapenem Resistant)  Stenotrophomonas maltophilia   Moderate polymorphonuclear leukocytes per low power field  Rare Squamous epithelial cells per low power field  Numerous Gram Negative Rods seen per oil power field   Few Pseudomonas aeruginosa (Carbapenem Resistant)  Moderate Serratia marcescens  Moderate Stenotrophomonas maltophilia  Normal Respiratory Elvira present                    Sodium:  Sodium, Serum: 138 mmol/L (12-17 @ 07:01)  Sodium, Serum: 143 mmol/L (12-15 @ 07:27)  Sodium, Serum: 142 mmol/L (12-14 @ 15:28)      0.88 mg/dL 12-17 @ 07:01  1.10 mg/dL 12-15 @ 07:27  1.20 mg/dL 12-14 @ 15:28      Hemoglobin:  Hemoglobin: 10.2 g/dL (12-17 @ 07:01)  Hemoglobin: 9.6 g/dL (12-14 @ 13:58)      Platelets: Platelet Count - Automated: 336 K/uL (12-17 @ 07:01)  Platelet Count - Automated: 332 K/uL (12-14 @ 13:58)      LIVER FUNCTIONS - ( 17 Dec 2021 07:01 )  Alb: 2.2 g/dL / Pro: 7.0 g/dL / ALK PHOS: 132 U/L / ALT: 16 U/L DA / AST: 13 U/L / GGT: x                 RADIOLOGY & ADDITIONAL STUDIES:      MICROBIOLOGY:  RECENT CULTURES:  12-14 .Blood Blood XXXX XXXX   No growth to date.    12-11 .Sputum Sputum Serratia marcescens  Pseudomonas aeruginosa (Carbapenem Resistant)  Stenotrophomonas maltophilia   Moderate polymorphonuclear leukocytes per low power field  Rare Squamous epithelial cells per low power field  Numerous Gram Negative Rods seen per oil power field   Few Pseudomonas aeruginosa (Carbapenem Resistant)  Moderate Serratia marcescens  Moderate Stenotrophomonas maltophilia  Normal Respiratory Elvira present

## 2021-12-17 NOTE — PROGRESS NOTE ADULT - SUBJECTIVE AND OBJECTIVE BOX
Patient is a 76y old  Female who presents with a chief complaint of Anemia (17 Dec 2021 12:34)    HPI:    75F with chronic resp failure - vent dependent, hx of subarachnoid hemorrhage, hx of cardiac arrest, dementia s/p PEG, HTN, DM2 on insulin, hx of CVA, GERD, COPD, admission here from 9/25 to 10/4 and 11/7-11/11 for respiratory failure/sepsis possibly 2/2 aspiration vs vent associated PNA who presents from [facility] for abnormal labs.  Patient does not contribute to history. In the ED, patient's initial triage vitals were BP 88/37, HR 81, RR 18, 100% on vent and T 99 F.  Labs showed leukocytosis of 12.18, HGB 5.2, BUN/Cr 89/2.00. CXR: Tracheostomy cannula reidentified in position. No change heart mediastinum. Widespread bilateral airspace disease slightly improved.  correlate for pulmonary edema and/or infection. No significant pleural effusion. No pneumothorax. Patient's hand projects over portion of the right base. CT chest and A/P pending    Patient with episode of tachypnea and hypoxia earlier requiring increase in FiO2 to 100%. Patient received on full mechanical ventilation unresponsive      Allergies: codeine    PAST MEDICAL & SURGICAL HISTORY:  Dementia of frontal lobe type    Aphasic stroke    Diabetes mellitus    Respiratory failure    Hypertension    GERD (gastroesophageal reflux disease)    Constipation    Respiratory failure    CVA (cerebral vascular accident)    HTN (hypertension)    DM (diabetes mellitus)    Advanced dementia    COVID-19 virus detected    Quadriplegia    Pneumonia    Type II diabetes mellitus    Hx of appendectomy    Gastrostomy in place    Tracheostomy in place    Tracheostomy tube present    Feeding by G-tube      FAMILY HISTORY:  No pertinent family history in first degree relatives      SOCIAL HISTORY:    Home Medications:    Review of Systems:  Unable to participate in full ROS due to mentation    T(F): 98.4 (12-17-21 @ 16:00), Max: 98.9 (12-17-21 @ 12:49)  HR: 74 (12-17-21 @ 19:43) (66 - 106)  BP: 142/77 (12-17-21 @ 19:00) (120/64 - 171/87)  RR: 18 (12-17-21 @ 19:00) (17 - 46)  SpO2: 100% (12-17-21 @ 19:43)  Wt(kg): --  Mode: AC/ CMV (Assist Control/ Continuous Mandatory Ventilation), RR (machine): 18, TV (machine): 400, FiO2: 60, PEEP: 5  CAPILLARY BLOOD GLUCOSE      POCT Blood Glucose.: 171 mg/dL (17 Dec 2021 17:18)    I&O's Summary    16 Dec 2021 07:01  -  17 Dec 2021 07:00  --------------------------------------------------------  IN: 1635 mL / OUT: 400 mL / NET: 1235 mL    17 Dec 2021 07:01  -  17 Dec 2021 21:01  --------------------------------------------------------  IN: 1075 mL / OUT: 900 mL / NET: 175 mL        Physical Exam:     Gen: chronically ill appearing  Neuro: PERRL  HEENT: supple  CVS: +S1S2  Resp: CTA  Abd: soft, NT, ND  Ext: no edema  Skin: well perfused    Meds:  piperacillin/tazobactam IVPB.. 3.375 Gram(s) IV Intermittent every 8 hours       dextrose 40% Gel 15 Gram(s) Oral once  dextrose 50% Injectable 25 Gram(s) IV Push once  dextrose 50% Injectable 12.5 Gram(s) IV Push once  dextrose 50% Injectable 25 Gram(s) IV Push once  glucagon  Injectable 1 milliGRAM(s) IntraMuscular once  insulin lispro (ADMELOG) corrective regimen sliding scale   SubCutaneous every 6 hours     ALBUTerol    90 MICROgram(s) HFA Inhaler 2 Puff(s) Inhalation every 6 hours     acetaminophen     Tablet .. 650 milliGRAM(s) Oral every 6 hours PRN  LORazepam     Tablet 1 milliGRAM(s) Oral every 4 hours  methocarbamol 500 milliGRAM(s) Oral two times a day  midazolam Injectable 2 milliGRAM(s) IV Push every 2 hours PRN        heparin   Injectable 5000 Unit(s) SubCutaneous every 12 hours     pantoprazole  Injectable 40 milliGRAM(s) IV Push every 12 hours  polyethylene glycol 3350 17 Gram(s) Oral every 12 hours  senna 2 Tablet(s) Oral at bedtime  simethicone 80 milliGRAM(s) Chew every 6 hours  sucralfate suspension 1 Gram(s) Enteral Tube every 6 hours        dextrose 5%. 1000 milliLiter(s) IV Continuous <Continuous>  dextrose 5%. 1000 milliLiter(s) IV Continuous <Continuous>        chlorhexidine 0.12% Liquid 15 milliLiter(s) Oral Mucosa every 12 hours                              10.2   7.94  )-----------( 336      ( 17 Dec 2021 07:01 )             34.0       12-17    138  |  105  |  20  ----------------------------<  158<H>  4.7   |  24  |  0.88    Ca    8.9      17 Dec 2021 07:01  Phos  2.6     12-17  Mg     1.9     12-17    TPro  7.0  /  Alb  2.2<L>  /  TBili  0.4  /  DBili  x   /  AST  13  /  ALT  16  /  AlkPhos  132<H>  12-17              .Blood Blood   No growth to date. -- 12-14 @ 22:29        ABG - ( 17 Dec 2021 05:46 )  pH, Arterial: 7.46  pH, Blood: x     /  pCO2: 34    /  pO2: 104   / HCO3: 24    / Base Excess: 0.4   /  SaO2: 98.5              Radiology:   < from: US Duplex Venous Lower Ext Complete, Bilateral (12.16.21 @ 21:48) >    ACC: [id number] EXAM:  US DPLX LWR EXT VEINS COMPL BI                          PROCEDURE DATE:  12/16/2021          INTERPRETATION:  CLINICAL INFORMATION: Leg swelling    COMPARISON: 5/11/2020    TECHNIQUE: Duplex sonography of the BILATERAL LOWER extremity veins with   color and spectral Doppler, with and without compression.    FINDINGS:    RIGHT:  Normal compressibility of the RIGHT common femoral, femoral and popliteal   veins.  Doppler examination shows normal spontaneous and phasic flow.  No RIGHT calf vein thrombosis is detected.    LEFT:  Normal compressibility of the LEFT common femoral, femoral and popliteal   veins.  Doppler examination shows normal spontaneous and phasic flow.  No LEFT calf vein thrombosis is detected.    IMPRESSION:  No evidence of deep venous thrombosis in either lower extremity.          --- End of Report ---            SCAR BURDEN MD; Attending Radiologist  This document has been electronically signed. Dec 17 2021  7:45AM    < end of copied text >      Problems  -Seizures  -Acute on chronic hypoxic respiratory failure    Assessment/Plan:    Neuro: No evidence of seizure activity at this time. Ativan standing w/ Versed PRN. Neurology following, awaiting bed for contious EEG monitoring.   CV:  Resp: Acute on chronic hypoxic respiratory failure; received on 100% FiO2. Actively titrating FiO2; able to wean down to 40%. Goal O2 sat >90%. Maintain full mechanical ventilation  GI:  Heme:  Endo:        Critical care time spent (mins): *** Patient is a 76y old  Female who presents with a chief complaint of Anemia (17 Dec 2021 12:34)    HPI:    75F with chronic resp failure - vent dependent, hx of subarachnoid hemorrhage, hx of cardiac arrest, dementia s/p PEG, HTN, DM2 on insulin, hx of CVA, GERD, COPD, admission here from 9/25 to 10/4 and 11/7-11/11 for respiratory failure/sepsis possibly 2/2 aspiration vs vent associated PNA who presents from [facility] for abnormal labs.  Patient does not contribute to history. In the ED, patient's initial triage vitals were BP 88/37, HR 81, RR 18, 100% on vent and T 99 F.  Labs showed leukocytosis of 12.18, HGB 5.2, BUN/Cr 89/2.00. CXR: Tracheostomy cannula reidentified in position. No change heart mediastinum. Widespread bilateral airspace disease slightly improved.  correlate for pulmonary edema and/or infection. No significant pleural effusion. No pneumothorax. Patient's hand projects over portion of the right base. CT chest and A/P pending    Patient with episode of tachypnea and hypoxia earlier requiring increase in FiO2 to 100%. Patient received on full mechanical ventilation unresponsive      Allergies: codeine    PAST MEDICAL & SURGICAL HISTORY:  Dementia of frontal lobe type    Aphasic stroke    Diabetes mellitus    Respiratory failure    Hypertension    GERD (gastroesophageal reflux disease)    Constipation    Respiratory failure    CVA (cerebral vascular accident)    HTN (hypertension)    DM (diabetes mellitus)    Advanced dementia    COVID-19 virus detected    Quadriplegia    Pneumonia    Type II diabetes mellitus    Hx of appendectomy    Gastrostomy in place    Tracheostomy in place    Tracheostomy tube present    Feeding by G-tube      FAMILY HISTORY:  No pertinent family history in first degree relatives      SOCIAL HISTORY:    Home Medications:    Review of Systems:  Unable to participate in full ROS due to mentation    T(F): 98.4 (12-17-21 @ 16:00), Max: 98.9 (12-17-21 @ 12:49)  HR: 74 (12-17-21 @ 19:43) (66 - 106)  BP: 142/77 (12-17-21 @ 19:00) (120/64 - 171/87)  RR: 18 (12-17-21 @ 19:00) (17 - 46)  SpO2: 100% (12-17-21 @ 19:43)  Wt(kg): --  Mode: AC/ CMV (Assist Control/ Continuous Mandatory Ventilation), RR (machine): 18, TV (machine): 400, FiO2: 60, PEEP: 5  CAPILLARY BLOOD GLUCOSE      POCT Blood Glucose.: 171 mg/dL (17 Dec 2021 17:18)    I&O's Summary    16 Dec 2021 07:01  -  17 Dec 2021 07:00  --------------------------------------------------------  IN: 1635 mL / OUT: 400 mL / NET: 1235 mL    17 Dec 2021 07:01  -  17 Dec 2021 21:01  --------------------------------------------------------  IN: 1075 mL / OUT: 900 mL / NET: 175 mL        Physical Exam:     Gen: chronically ill appearing  Neuro: PERRL  HEENT: supple  CVS: +S1S2  Resp: CTA  Abd: soft, NT, ND  Ext: no edema  Skin: well perfused    Meds:  piperacillin/tazobactam IVPB.. 3.375 Gram(s) IV Intermittent every 8 hours       dextrose 40% Gel 15 Gram(s) Oral once  dextrose 50% Injectable 25 Gram(s) IV Push once  dextrose 50% Injectable 12.5 Gram(s) IV Push once  dextrose 50% Injectable 25 Gram(s) IV Push once  glucagon  Injectable 1 milliGRAM(s) IntraMuscular once  insulin lispro (ADMELOG) corrective regimen sliding scale   SubCutaneous every 6 hours     ALBUTerol    90 MICROgram(s) HFA Inhaler 2 Puff(s) Inhalation every 6 hours     acetaminophen     Tablet .. 650 milliGRAM(s) Oral every 6 hours PRN  LORazepam     Tablet 1 milliGRAM(s) Oral every 4 hours  methocarbamol 500 milliGRAM(s) Oral two times a day  midazolam Injectable 2 milliGRAM(s) IV Push every 2 hours PRN        heparin   Injectable 5000 Unit(s) SubCutaneous every 12 hours     pantoprazole  Injectable 40 milliGRAM(s) IV Push every 12 hours  polyethylene glycol 3350 17 Gram(s) Oral every 12 hours  senna 2 Tablet(s) Oral at bedtime  simethicone 80 milliGRAM(s) Chew every 6 hours  sucralfate suspension 1 Gram(s) Enteral Tube every 6 hours        dextrose 5%. 1000 milliLiter(s) IV Continuous <Continuous>  dextrose 5%. 1000 milliLiter(s) IV Continuous <Continuous>        chlorhexidine 0.12% Liquid 15 milliLiter(s) Oral Mucosa every 12 hours                              10.2   7.94  )-----------( 336      ( 17 Dec 2021 07:01 )             34.0       12-17    138  |  105  |  20  ----------------------------<  158<H>  4.7   |  24  |  0.88    Ca    8.9      17 Dec 2021 07:01  Phos  2.6     12-17  Mg     1.9     12-17    TPro  7.0  /  Alb  2.2<L>  /  TBili  0.4  /  DBili  x   /  AST  13  /  ALT  16  /  AlkPhos  132<H>  12-17              .Blood Blood   No growth to date. -- 12-14 @ 22:29        ABG - ( 17 Dec 2021 05:46 )  pH, Arterial: 7.46  pH, Blood: x     /  pCO2: 34    /  pO2: 104   / HCO3: 24    / Base Excess: 0.4   /  SaO2: 98.5              Radiology:   < from: US Duplex Venous Lower Ext Complete, Bilateral (12.16.21 @ 21:48) >    ACC: [id number] EXAM:  US DPLX LWR EXT VEINS COMPL BI                          PROCEDURE DATE:  12/16/2021          INTERPRETATION:  CLINICAL INFORMATION: Leg swelling    COMPARISON: 5/11/2020    TECHNIQUE: Duplex sonography of the BILATERAL LOWER extremity veins with   color and spectral Doppler, with and without compression.    FINDINGS:    RIGHT:  Normal compressibility of the RIGHT common femoral, femoral and popliteal   veins.  Doppler examination shows normal spontaneous and phasic flow.  No RIGHT calf vein thrombosis is detected.    LEFT:  Normal compressibility of the LEFT common femoral, femoral and popliteal   veins.  Doppler examination shows normal spontaneous and phasic flow.  No LEFT calf vein thrombosis is detected.    IMPRESSION:  No evidence of deep venous thrombosis in either lower extremity.          --- End of Report ---            SCAR BURDEN MD; Attending Radiologist  This document has been electronically signed. Dec 17 2021  7:45AM    < end of copied text >      Problems  -Seizures  -Acute on chronic hypoxic respiratory failure  -Sepsis    Assessment/Plan:    Neuro: No evidence of seizure activity at this time. Ativan standing w/ Versed PRN. Neurology following, awaiting bed for contious EEG monitoring.   CV: HD stable at this time  Resp: Acute on chronic hypoxic respiratory failure; received on 100% FiO2. Actively titrating FiO2; able to wean down to 40%. Goal O2 sat >90%. Maintain full mechanical ventilation  GI: Tolerating tube feeds. s/p PEG replacement during this admision  ID: Sepsis secondary to CR pseudomonas, Serratia, and steno. Abx coverage w/ ZOsyn. ID following case  Heme: Heparin SC for DVT ppx; LE dopplers negative  Endo: Gycemic contorl

## 2021-12-17 NOTE — PROGRESS NOTE ADULT - ASSESSMENT
CHAPINCITO GUIDRY 75 f [facility] S 12/8/2021   DR ILIANA KEMP     REVIEW OF SYMPTOMS      Able to give (reliable) ROS  NO     PHYSICAL EXAM    HEENT Unremarkable  atraumatic   RESP Fair air entry EXP prolonged    Harsh breath sound Resp distres mild   CARDIAC S1 S2 No S3     NO JVD    ABDOMEN SOFT BS PRESENT NOT DISTENDED No hepatosplenomegaly   PEDAL EDEMA present No calf tenderness  NO rash       ________________  Age DOA CC.  76 year old female with a history of Pneumonia  HTN, DM, CVA, dementia, chronic respiratory failure s/p trach, PEG, cardiac arrest who presented 12/8/2021 from NH with abnormal labs. She was found to have a hemoglobin of 6. No further history could be obtained from patient.  Pulm crit care consulted 12/8/2021     _____                            GOC.12/8/2021 Full code   COVID STATUS. 12/8/2021 scv2 (-)   ICU STAY. Admitted ICU 12/8/2021   ABIO.   12/11 sputum stenotrophomonas levaq sens  12/11 sputum CRP Pseudomonas  12/11 sputu serratia   12/13/2021 Levaquin 750 1 dose Dr Medellin  12/14 zosyn x 7d Dr Tay      BEST PRACTICE ISSUES.                                                  HEAD OF BED ELEVATION. Yes  DVT PROPHYLAXIS.      12/10 [facility]            SQUIRES PROPHYLAXIS.      12/8/2021 IV protonix 40.2       12/9/2021 carafate 1.4                                                  DIET.            12/9/2021 glucerna 1.5 1000 12/9  INFECTION PROPHYLAXIS.    12/8/2021 chlorhexidine 0.12% bid     GENERAL ISSUES                                       ALLERGY.         codeine                   WEIGHT.           12/8/2021 61                           BMI.                   12/8/2021 22  SPEECH SWALLOW RECOMMENDATIONS.   IV FLUIDS.     PATIENT DATA   VITALS/PO/IO/VENT/DRIPS.   12/17/2021 afeb 96 140/70   12/17/2021 6a 18/400/5/.4        ASSESSMENT/RECOMMENDATIONS.    COPD pmh.   12/9/2021 albuterol hf.4   12/9/2021 Spiriva   Cont Rx      VENT MANAGEMENT.  VENT DEPENDENT RESP FAILURE pmh.  12/17/2021 40% 18 400 5 746/34/104  cxr 12/17/2021 cxr arm across r lower lung l base opac  V duplx 12/16 v duplx (-)    gas exchange ok    HEMODYNAMICS.   HYPOTENSION poa.   Echo 9/8/2021 ECHO n lvsf mild dd pasp 51   BP has improvd   target map 65 (+)     INFECTION.   Pneumonia  poa 12/8/2021 12/11 sputum stenotrophomonas levaq sens  12/11 sputum CRP Pseudomonas  12/11 sputu serratia   w 12/9-12/11-12/14/2021 w 8.8 - 6.9-17   12/9 pr 6.7   CXR 12/14/2021 bl opac poss effsns maddie l side   CXR 12/8 widespread airspace dis sl improvd cw 11/9/2021 12/8 ct cap distal airway impaction and mf pneum related to aspirat   Changes are either new or unchanged   Volume loss rll has improvd since 11/7/2021   Small l effsn     12/8 mrsa (-)   12/8 blod c (-)  12/8 urine c (-)   12/11 sputum stenotrophomonas levaq sens  12/11 sputum CRP Pseudomonas  12/11 sputu serratia   12/13/2021 Levaquin 750 1 dose Dr Medellin  12/14 zosyn x 7d Dr Tay    SEVERE ANEMIA poa   Hb 12/8-12/8-12/9-12/11-12/12-12/13-12/14-12/17/2021   Hb 5.2 -9.7-8.6 - 9.4-9.1-8.9 -9.6- 10.2  Hb stable     GERD pmh.  On ppi     sp PEG pmh    GI BLEED poa 12/8 12/9/2021 SOB (+)   12/9/2021 Seen by Dr Nieves No intervention pplannd   12/8/2021 IV protonix 40.2       12/9/2021 carafate 1.4                12/9/2021 Dr BETH pittayed dvt pplx [facility]   12/11/2021 gi not planning any pproced      SEIZURES SPASMS pmh   12/16/2021  lorazepam 1.3   12/14 METHOCARBAMOL 500.2   12/15/2021 midazolam 2.12p   12/14/2021 plan is to send to [facility] for eeg monitoring      OVERALL ISSUES  76 full code Anoxic encephalopathy vdrf peg trach patient      ANEMIA Monitor Hb   PNEUM zosyn (crp) One dose levaq 12/13 (steno)  SEIZURES 12/14/2021     TIME SPENT   Over 36 minutes aggregate critical care time spent on encounter; activities included   direct patient care, counseling and/or coordinating care reviewing notes, lab data/ imaging , discussion with multidisciplinary team/ patient  /family and explaining in detail risks, benefits, alternatives  of the recommendations     CHAPINCITO GUIDRY 75 f [facility] S 12/8/2021   DR ILIANA KEMP

## 2021-12-17 NOTE — PROGRESS NOTE ADULT - SUBJECTIVE AND OBJECTIVE BOX
Neurology Follow up note    Covering Dr. Kip BECKHAM, BREA 76y Female    HPI: ***Patient with dementia and is not responsive.  Collateral information obtained from chart and notes.***    75F with chronic resp failure - vent dependent, hx of subarachnoid hemorrhage, hx of cardiac arrest, dementia s/p PEG, HTN, DM2 on insulin, hx of CVA, GERD, COPD, admission here from 9/25 to 10/4 and 11/7-11/11 for respiratory failure/sepsis possibly 2/2 aspiration vs vent associated PNA who presents from [facility] for abnormal labs.  Patient does not contribute to history. In the ED, patient's initial triage vitals were BP 88/37, HR 81, RR 18, 100% on vent and T 99 F.  Labs showed leukocytosis of 12.18, HGB 5.2, BUN/Cr 89/2.00. CXR: Tracheostomy cannula reidentified in position. No change heart mediastinum. Widespread bilateral airspace disease slightly improved.  correlate for pulmonary edema and/or infection. No significant pleural effusion. No pneumothorax. Patient's hand projects over portion of the right base. CT chest and A/P pending  (08 Dec 2021 15:50)    Interval History -    Patient is seen, chart was reviewed and case was discussed with the treatment team.    Vital Signs Last 24 Hrs  T(C): 36.7 (17 Dec 2021 08:21), Max: 36.9 (17 Dec 2021 05:30)  T(F): 98.1 (17 Dec 2021 08:21), Max: 98.4 (17 Dec 2021 05:30)  HR: 66 (17 Dec 2021 11:47) (66 - 106)  BP: 129/67 (17 Dec 2021 10:00) (128/63 - 153/98)  BP(mean): 86 (17 Dec 2021 10:00) (77 - 116)  RR: 26 (17 Dec 2021 10:00) (17 - 35)  SpO2: 99% (17 Dec 2021 11:47) (95% - 100%)    MEDICATIONS    acetaminophen     Tablet .. 650 milliGRAM(s) Oral every 6 hours PRN  ALBUTerol    90 MICROgram(s) HFA Inhaler 2 Puff(s) Inhalation every 6 hours  chlorhexidine 0.12% Liquid 15 milliLiter(s) Oral Mucosa every 12 hours  dextrose 40% Gel 15 Gram(s) Oral once  dextrose 5%. 1000 milliLiter(s) IV Continuous <Continuous>  dextrose 5%. 1000 milliLiter(s) IV Continuous <Continuous>  dextrose 50% Injectable 25 Gram(s) IV Push once  dextrose 50% Injectable 12.5 Gram(s) IV Push once  dextrose 50% Injectable 25 Gram(s) IV Push once  glucagon  Injectable 1 milliGRAM(s) IntraMuscular once  heparin   Injectable 5000 Unit(s) SubCutaneous every 12 hours  insulin lispro (ADMELOG) corrective regimen sliding scale   SubCutaneous every 6 hours  LORazepam     Tablet 1 milliGRAM(s) Oral every 4 hours  methocarbamol 500 milliGRAM(s) Oral two times a day  midazolam Injectable 2 milliGRAM(s) IV Push every 2 hours PRN  pantoprazole  Injectable 40 milliGRAM(s) IV Push every 12 hours  piperacillin/tazobactam IVPB.. 3.375 Gram(s) IV Intermittent every 8 hours  polyethylene glycol 3350 17 Gram(s) Oral every 12 hours  senna 2 Tablet(s) Oral at bedtime  simethicone 80 milliGRAM(s) Chew every 6 hours  sucralfate suspension 1 Gram(s) Enteral Tube every 6 hours    Allergies    codeine (Hives)      PHYSICAL EXAMINATION:        HEENT:  Head:  Normocephalic.  Eyes:  No scleral icterus.  Ears:  Hard to evaluate secondary to the patient being nonverbal.  NECK:  Had increased tone, tracheostomy was in place.    RESPIRATORY:  Decreased breath sounds bilaterally.    CARDIOVASCULAR:  S1 and S2 heard.    ABDOMEN:  Soft and nontender.  PEG in place.  EXTREMITIES:  No clubbing or cyanosis was noted.      NEUROLOGIC:      Resting in bed with eyes open.  Upon stimulation of the patient, her eyes would roll up, with subtle shaking.    Pupils are 2.5 mm reacting to light. No obvious facial asymmtery  Speech:  Nonverbal.    Moves extremities sightly on stimulation. Tone in all four extremities increased.  Bilateral upper extremities were in a flexed position.  Bilateral lower extremities were in the straight position.  DTR 2 plus.  Neck is supple.      LABS:    CBC Full  -  ( 17 Dec 2021 07:01 )  WBC Count : 7.94 K/uL  RBC Count : 4.04 M/uL  Hemoglobin : 10.2 g/dL  Hematocrit : 34.0 %  Platelet Count - Automated : 336 K/uL  Mean Cell Volume : 84.2 fl  Mean Cell Hemoglobin : 25.2 pg  Mean Cell Hemoglobin Concentration : 30.0 gm/dL    12-17    138  |  105  |  20  ----------------------------<  158<H>  4.7   |  24  |  0.88    Ca    8.9      17 Dec 2021 07:01  Phos  2.6     12-17  Mg     1.9     12-17    TPro  7.0  /  Alb  2.2<L>  /  TBili  0.4  /  DBili  x   /  AST  13  /  ALT  16  /  AlkPhos  132<H>  12-17    Radiology -     < from: CT Abdomen and Pelvis No Cont (12.08.21 @ 16:52) >  Limited study without IV contrast.    CHEST:  1.Tracheostomy. Distal airways impaction and multifocal pneumonia,   possibly related to aspiration. Most of the opacities are either new or   unchanged since prior study. However, volume loss of the right lower lobe   has improved since 11/7/2021. Small left pleural effusion and trace right   pleural effusion, similar to prior. Follow to resolution with repeat   chest CT in 4 weeks.  2. Esophagus is distended with air probable left-sided diverticulum at   the thyroid level, image 15 series 4, similar to prior.  3. Coronary artery calcification and mitral calcification. Low   attenuation of the blood pool of the heart may reflect anemia.    ABDOMEN/PELVIS:  1. Mild chest and abdominal wall soft tissue edema. Symmetric appearance   of the abdominal wall and retroperitoneal musculature, without evidence   of hematoma. Correlate with clinical status of the patient, serial   hemoglobin and hematocrit to determine need for follow-up postcontrast   imaging.  2. No hydronephrosis of the kidneys.    --- End of Report ---      < end of copied text >

## 2021-12-17 NOTE — PROGRESS NOTE ADULT - ASSESSMENT
75F with chronic resp failure - vent dependent, hx of subarachnoid hemorrhage, hx of cardiac arrest, dementia s/p PEG, HTN, DM2 on insulin, hx of CVA, GERD, COPD, admission here from 9/25 to 10/4 and 11/7-11/11 for respiratory failure/sepsis possibly 2/2 aspiration vs vent associated PNA who presents from [facility] w leukocytosis of 12.18, and concerns for repeat aspiration PNA    CT-Distal airways impaction and multifocal pneumonia, possibly related to aspiration. Most of the opacities are either new or unchanged since prior study.     VAP  Acute on Chronic Hypoxic Respiratory Failure  -infectious w/u reviewed  --12/11 RCx w/ CRE Pseudomonas, Stenotrophomonas and Serratia marcescens  s/p course of zosyn (12/8-12/14), s/p levofloxacin 750mg IV x 1 on 12/13  leukocytosis possible reactive 2/2 seizure vs aspiration pneumonia or pneumonitis during seizure  -imaging reviewed  CXR 12/14 w/ increased bibasilar infiltrates compared with 12/8; vettings back to FiO2 of 40%   CXR 12/16 w/ improved aeration of L lung, b/l lower lobe opacification; suspect progression of prior aspiration PNA which will take time to resolve on imaging vs pulmonary edema given rapid improvement on CXR  -trend temps/WBC  --leukocytosis resolved  -vent management per pulm/ICU  -zosyn restarted on 12/14; continue for now. Will likely continue for additional 5-7 day course pending clinical improvement    Dr. Olivas will be covering the service this weekend  Infectious Diseases will continue to follow. Please call with any questions.   Andressa Brantley M.D.  [facility], Division of Infectious Diseases [phone]  For over the weekend and after hours, please call [phone]

## 2021-12-17 NOTE — PROGRESS NOTE ADULT - SUBJECTIVE AND OBJECTIVE BOX
[facility], Division of Infectious Diseases  MOIZ Lora Y. Patel, S. Shah  [phone]    Name: BREA BECKHAM  Age: 76y  Gender: Female  MRN: [id number]    Interval History:  Patient seen and examined at bedside this morning  No acute overnight events. Afebrile overnight  Notes reviewed    Antibiotics:  piperacillin/tazobactam IVPB.. 3.375 Gram(s) IV Intermittent every 8 hours      Medications:  acetaminophen     Tablet .. 650 milliGRAM(s) Oral every 6 hours PRN  ALBUTerol    90 MICROgram(s) HFA Inhaler 2 Puff(s) Inhalation every 6 hours  chlorhexidine 0.12% Liquid 15 milliLiter(s) Oral Mucosa every 12 hours  dextrose 40% Gel 15 Gram(s) Oral once  dextrose 5%. 1000 milliLiter(s) IV Continuous <Continuous>  dextrose 5%. 1000 milliLiter(s) IV Continuous <Continuous>  dextrose 50% Injectable 25 Gram(s) IV Push once  dextrose 50% Injectable 12.5 Gram(s) IV Push once  dextrose 50% Injectable 25 Gram(s) IV Push once  glucagon  Injectable 1 milliGRAM(s) IntraMuscular once  heparin   Injectable 5000 Unit(s) SubCutaneous every 12 hours  insulin lispro (ADMELOG) corrective regimen sliding scale   SubCutaneous every 6 hours  LORazepam     Tablet 1 milliGRAM(s) Oral every 4 hours  methocarbamol 500 milliGRAM(s) Oral two times a day  midazolam Injectable 2 milliGRAM(s) IV Push every 2 hours PRN  pantoprazole  Injectable 40 milliGRAM(s) IV Push every 12 hours  piperacillin/tazobactam IVPB.. 3.375 Gram(s) IV Intermittent every 8 hours  polyethylene glycol 3350 17 Gram(s) Oral every 12 hours  senna 2 Tablet(s) Oral at bedtime  simethicone 80 milliGRAM(s) Chew every 6 hours  sucralfate suspension 1 Gram(s) Enteral Tube every 6 hours      Review of Systems:  unable to obtain    Allergies: codeine (Hives)    For details regarding the patient's past medical history, social history, family history, and other miscellaneous elements, please refer the initial infectious diseases consultation and/or the admitting history and physical examination for this admission.    Objective:  Vitals:   T(C): 36.9 (12-17-21 @ 05:30), Max: 36.9 (12-17-21 @ 05:30)  HR: 74 (12-17-21 @ 06:54) (69 - 106)  BP: 141/81 (12-17-21 @ 06:00) (116/55 - 153/98)  RR: 21 (12-17-21 @ 06:00) (17 - 35)  SpO2: 96% (12-17-21 @ 06:54) (95% - 100%)    Physical Examination:  General: no acute distress  HEENT: trachesotomy  Cardio: S1, S2 present, irregularly irregular, normal rate  Resp: on vent  Abd: soft, distended  Neuro: aphonia, does not respond to commands  Ext: b/l LE edema, contractures b/l  Skin: warm, dry, no visible rash    Laboratory Studies:  CBC:                       10.2   7.94  )-----------( 336      ( 17 Dec 2021 07:01 )             34.0     CMP:         Microbiology: reviewed    Culture - Blood (collected 12-14-21 @ 22:29)  Source: .Blood Blood  Preliminary Report (12-15-21 @ 23:02):    No growth to date.    Culture - Sputum (collected 12-11-21 @ 00:41)  Source: .Sputum Sputum  Gram Stain (12-11-21 @ 04:56):    Moderate polymorphonuclear leukocytes per low power field    Rare Squamous epithelial cells per low power field    Numerous Gram Negative Rods seen per oil power field  Final Report (12-12-21 @ 17:47):    Few Pseudomonas aeruginosa (Carbapenem Resistant)    Moderate Serratia marcescens    Moderate Stenotrophomonas maltophilia    Normal Respiratory Elvira present  Organism: Stenotrophomonas maltophilia (12-12-21 @ 18:03)      -  Ceftazidime: R >16      -  Levofloxacin: S <=0.5      -  Trimethoprim/Sulfamethoxazole: S <=0.5/9.5      Method Type: PRATIK  Organism: Pseudomonas aeruginosa (Carbapenem Resistant) (12-12-21 @ 18:03)      -  Amikacin: S <=16      -  Aztreonam: S <=4      -  Cefepime: S 4      -  Ceftazidime: S 4      -  Ceftazidime/Avibactam: S      -  Ceftolozane/tazobactam: S      -  Ciprofloxacin: S <=0.25      -  Gentamicin: S <=2      -  Imipenem: R >8      -  Levofloxacin: S <=0.5      -  Meropenem: I 4      -  Piperacillin/Tazobactam: S <=8      -  Tobramycin: S <=2      Method Type: PRATIK  Organism: Serratia marcescens (12-12-21 @ 18:03)      -  Amikacin: S <=16      -  Amoxicillin/Clavulanic Acid: R >16/8      -  Ampicillin: R >16 These ampicillin results predict results for amoxicillin      -  Ampicillin/Sulbactam: R >16/8 Enterobacter, Klebsiella aerogenes, Citrobacter, and Serratia may develop resistance during prolonged therapy (3-4 days)      -  Aztreonam: S <=4      -  Cefazolin: R >16 Enterobacter, Klebsiella aerogenes, Citrobacter, and Serratia may develop resistance during prolonged therapy (3-4 days)      -  Cefepime: S <=2      -  Cefoxitin: R <=8      -  Ceftriaxone: I 2 Enterobacter, Klebsiella aerogenes, Citrobacter, and Serratia may develop resistance during prolonged therapy      -  Ciprofloxacin: R >2      -  Ertapenem: S <=0.5      -  Gentamicin: S <=2      -  Levofloxacin: R 2      -  Meropenem: S <=1      -  Piperacillin/Tazobactam: S <=8      -  Tobramycin: S <=2      -  Trimethoprim/Sulfamethoxazole: S <=0.5/9.5      Method Type: PRATIK  Organism: Serratia marcescens  Pseudomonas aeruginosa (Carbapenem Resistant)  Stenotrophomonas maltophilia (12-12-21 @ 17:47)    Culture - Urine (collected 12-08-21 @ 17:55)  Source: Clean Catch Clean Catch (Midstream)  Final Report (12-09-21 @ 13:58):    <10,000 CFU/mL Normal Urogenital Elvira    Culture - Blood (collected 12-08-21 @ 15:24)  Source: .Blood Blood-Peripheral  Final Report (12-13-21 @ 16:01):    No Growth Final    Culture - Blood (collected 12-08-21 @ 15:24)  Source: .Blood Blood-Peripheral  Final Report (12-13-21 @ 16:01):    No Growth Final        Radiology: reviewed  < from: US Duplex Venous Lower Ext Complete, Bilateral (12.16.21 @ 21:48) >    ******PRELIMINARY REPORT******      ******PRELIMINARY REPORT******       ACC: [id number] EXAM:  US DPLX LWR EXT VEINS COMPL BI                          PROCEDURE DATE:  12/16/2021    ******PRELIMINARY REPORT******      ******PRELIMINARY REPORT******           INTERPRETATION:  No deep venous thrombus in the right or left lower   extremity veins.        ******PRELIMINARY REPORT******      ******PRELIMINARY REPORT******         MAYNOR CURRIE DO; Attending Radiologist    < end of copied text >  < from: Xray Chest 1 View- PORTABLE-Routine (Xray Chest 1 View- PORTABLE-Routine in AM.) (12.15.21 @ 06:56) >    ACC: [id number] EXAM:  XR CHEST PORTABLE ROUTINE 1V                          PROCEDURE DATE:  12/15/2021          INTERPRETATION:  Chest one view    HISTORY: Pneumonia    COMPARISON STUDY: 12/14/2021    Frontal expiratory view of the chest shows the heart to be normal in   size. Tracheostomy tube is again noted. Distended air-filled structure in   the lower neck likely indicates distended esophagus.    The lungs show slight clearing of the lungs with reduction of left   effusion and there is no evidence of pneumothorax nor right pleural   effusion. Incidentally noted is surgical hardware in the patient's   overlying left forearm.    IMPRESSION:  Partial clearing of the lungs. Distended upper esophagus. Clinical   correlation is suggested.        Thank you for the courtesy of this referral.    --- End of Report ---            ELLE PATTEN MD; Attending Interventional Radiologist  This document has been electronically signed. Dec 16 2021 11:26AM    < end of copied text >

## 2021-12-17 NOTE — PROGRESS NOTE ADULT - SUBJECTIVE AND OBJECTIVE BOX
Subjective: Patient seen and examined. No overnight events. Had 3 BM overnight.     MEDICATIONS  (STANDING):  ALBUTerol    90 MICROgram(s) HFA Inhaler 2 Puff(s) Inhalation every 6 hours  chlorhexidine 0.12% Liquid 15 milliLiter(s) Oral Mucosa every 12 hours  dextrose 40% Gel 15 Gram(s) Oral once  dextrose 5%. 1000 milliLiter(s) (50 mL/Hr) IV Continuous <Continuous>  dextrose 5%. 1000 milliLiter(s) (100 mL/Hr) IV Continuous <Continuous>  dextrose 50% Injectable 25 Gram(s) IV Push once  dextrose 50% Injectable 12.5 Gram(s) IV Push once  dextrose 50% Injectable 25 Gram(s) IV Push once  glucagon  Injectable 1 milliGRAM(s) IntraMuscular once  heparin   Injectable 5000 Unit(s) SubCutaneous every 12 hours  insulin lispro (ADMELOG) corrective regimen sliding scale   SubCutaneous every 6 hours  LORazepam     Tablet 1 milliGRAM(s) Oral every 4 hours  methocarbamol 500 milliGRAM(s) Oral two times a day  pantoprazole  Injectable 40 milliGRAM(s) IV Push every 12 hours  piperacillin/tazobactam IVPB.. 3.375 Gram(s) IV Intermittent every 8 hours  polyethylene glycol 3350 17 Gram(s) Oral every 12 hours  senna 2 Tablet(s) Oral at bedtime  simethicone 80 milliGRAM(s) Chew every 6 hours  sucralfate suspension 1 Gram(s) Enteral Tube every 6 hours    MEDICATIONS  (PRN):  acetaminophen     Tablet .. 650 milliGRAM(s) Oral every 6 hours PRN Temp greater or equal to 38C (100.4F), Mild Pain (1 - 3)  midazolam Injectable 2 milliGRAM(s) IV Push every 2 hours PRN Seizure activity      Allergies    codeine (Hives)    Intolerances        Vital Signs Last 24 Hrs  T(C): 36.7 (17 Dec 2021 08:21), Max: 36.9 (17 Dec 2021 05:30)  T(F): 98.1 (17 Dec 2021 08:21), Max: 98.4 (17 Dec 2021 05:30)  HR: 79 (17 Dec 2021 10:00) (69 - 106)  BP: 129/67 (17 Dec 2021 10:00) (128/63 - 153/98)  BP(mean): 86 (17 Dec 2021 10:00) (77 - 116)  RR: 26 (17 Dec 2021 10:00) (17 - 35)  SpO2: 100% (17 Dec 2021 10:00) (95% - 100%)    PHYSICAL EXAM:  GENERAL: No Distress; Sedated and Trach to Vent   HEAD:  Atraumatic, Normocephalic  ENMT: Moist mucous membranes,   NECK: Trach to Vent  CHEST/LUNG: Coarse BS Bilaterally   HEART: Regular rate and rhythm; No murmurs, rubs, or gallops  ABDOMEN: Soft; + PEG TUBE   EXTREMITIES:  2+ Peripheral Pulses, No clubbing, cyanosis, or edema      LABS:                        10.2   7.94  )-----------( 336      ( 17 Dec 2021 07:01 )             34.0     17 Dec 2021 07:01    138    |  105    |  20     ----------------------------<  158    4.7     |  24     |  0.88     Ca    8.9        17 Dec 2021 07:01  Phos  2.6       17 Dec 2021 07:01  Mg     1.9       17 Dec 2021 07:01    TPro  7.0    /  Alb  2.2    /  TBili  0.4    /  DBili  x      /  AST  13     /  ALT  16     /  AlkPhos  132    17 Dec 2021 07:01        CAPILLARY BLOOD GLUCOSE      POCT Blood Glucose.: 168 mg/dL (17 Dec 2021 06:56)  POCT Blood Glucose.: 156 mg/dL (17 Dec 2021 00:11)  POCT Blood Glucose.: 155 mg/dL (16 Dec 2021 17:42)  POCT Blood Glucose.: 168 mg/dL (16 Dec 2021 17:02)  POCT Blood Glucose.: 158 mg/dL (16 Dec 2021 11:49)      RADIOLOGY & ADDITIONAL TESTS:    Imaging Personally Reviewed:  [ ] YES     Consultant(s) Notes Reviewed:      Care Discussed with Consultants/Other Providers:    Advanced Directives: [ ] DNR  [ ] No feeding tube  [ ] MOLST in chart  [ ] MOLST completed today  [ ] Unknown

## 2021-12-17 NOTE — PROGRESS NOTE ADULT - SUBJECTIVE AND OBJECTIVE BOX
Chief Complaint: Abnormal labs    Interval Events: No events overnight.    Review of Systems:  Unable to obtain    Physical Exam:  Vital Signs Last 24 Hrs  T(C): 36.7 (17 Dec 2021 08:21), Max: 36.9 (17 Dec 2021 05:30)  T(F): 98.1 (17 Dec 2021 08:21), Max: 98.4 (17 Dec 2021 05:30)  HR: 96 (17 Dec 2021 08:11) (69 - 106)  BP: 145/66 (17 Dec 2021 07:00) (128/63 - 153/98)  BP(mean): 89 (17 Dec 2021 07:00) (77 - 116)  RR: 24 (17 Dec 2021 08:11) (17 - 35)  SpO2: 100% (17 Dec 2021 08:11) (95% - 100%)  General: Unresponsive  HEENT: Trach  Neck: No JVD, no carotid bruit  Lungs: CTAB  CV: RRR, nl S1/S2, no M/R/G  Abdomen: S/NT/ND, +BS  Extremities: No LE edema, no cyanosis  Neuro: AAOx0  Skin: No rash    Labs:    12-17    138  |  105  |  20  ----------------------------<  158<H>  4.7   |  24  |  0.88    Ca    8.9      17 Dec 2021 07:01  Phos  2.6     12-17  Mg     1.9     12-17    TPro  7.0  /  Alb  2.2<L>  /  TBili  0.4  /  DBili  x   /  AST  13  /  ALT  16  /  AlkPhos  132<H>  12-17                        10.2   7.94  )-----------( 336      ( 17 Dec 2021 07:01 )             34.0       Telemetry: Sinus rhythm

## 2021-12-17 NOTE — PROGRESS NOTE ADULT - ASSESSMENT
75F Chronic Respiratory Failure, Dementia, HTN, DM2, COPD, admitted for Acute Encephalopathy and Aspiration PNA    Aspiration PNA / Chronic Respiratory Failure / COPD - Chronic Respiratory Failure and Vent Dependant from history of SAH and Cardiac Arrest. Sepsis POA and on IV Zosyn.  Persistent leukocytosis. Albuterol PRN Pulmonary and Infectious Disease following.       Abnormal Body Movements - EEG was planned to rule out epileptic activity and pending due to availability at transfer facility. Currently on Ativan and weaning off Versed. Spouse feels this is due to GI and Oxygenation related issues but still wants EEG.  Had BM last night. Neurology following.     Anemia - Possible GI Bleed. GI consulted with no plans for scope.  Will monitor and transfuse as needed.     CKD 3 - Baseline Creatinine 1.0. Avoid Nephrotoxic agents and monitor BMP and electrolytes. Nephrology following.     PEG Tube Place Malfunction - PEG Tube replaced 12/13    GERD - PPI     Diet - Regular    DVT Prophylaxis - Aspirin BID    Disposition -  Discharge planning pending weaning off Versed Infusion and EEG.

## 2021-12-17 NOTE — PROGRESS NOTE ADULT - SUBJECTIVE AND OBJECTIVE BOX
Date/Time Patient Seen:  		  Referring MD:   Data Reviewed	       Patient is a 76y old  Female who presents with a chief complaint of Anemia (16 Dec 2021 23:43)      Subjective/HPI     PAST MEDICAL & SURGICAL HISTORY:  Dementia of frontal lobe type    Aphasic stroke    Diabetes mellitus    Respiratory failure    Hypertension    GERD (gastroesophageal reflux disease)    Constipation    Respiratory failure    CVA (cerebral vascular accident)    HTN (hypertension)    DM (diabetes mellitus)    Advanced dementia    COVID-19 virus detected    Quadriplegia    Pneumonia    Type II diabetes mellitus    Hx of appendectomy    Gastrostomy in place    Tracheostomy in place    Tracheostomy tube present    Feeding by G-tube          Medication list         MEDICATIONS  (STANDING):  ALBUTerol    90 MICROgram(s) HFA Inhaler 2 Puff(s) Inhalation every 6 hours  chlorhexidine 0.12% Liquid 15 milliLiter(s) Oral Mucosa every 12 hours  dextrose 40% Gel 15 Gram(s) Oral once  dextrose 5%. 1000 milliLiter(s) (50 mL/Hr) IV Continuous <Continuous>  dextrose 5%. 1000 milliLiter(s) (100 mL/Hr) IV Continuous <Continuous>  dextrose 50% Injectable 25 Gram(s) IV Push once  dextrose 50% Injectable 12.5 Gram(s) IV Push once  dextrose 50% Injectable 25 Gram(s) IV Push once  glucagon  Injectable 1 milliGRAM(s) IntraMuscular once  heparin   Injectable 5000 Unit(s) SubCutaneous every 12 hours  insulin lispro (ADMELOG) corrective regimen sliding scale   SubCutaneous every 6 hours  LORazepam     Tablet 1 milliGRAM(s) Oral every 4 hours  methocarbamol 500 milliGRAM(s) Oral two times a day  pantoprazole  Injectable 40 milliGRAM(s) IV Push every 12 hours  piperacillin/tazobactam IVPB.. 3.375 Gram(s) IV Intermittent every 8 hours  polyethylene glycol 3350 17 Gram(s) Oral every 12 hours  senna 2 Tablet(s) Oral at bedtime  simethicone 80 milliGRAM(s) Chew every 6 hours  sucralfate suspension 1 Gram(s) Enteral Tube every 6 hours    MEDICATIONS  (PRN):  acetaminophen     Tablet .. 650 milliGRAM(s) Oral every 6 hours PRN Temp greater or equal to 38C (100.4F), Mild Pain (1 - 3)  midazolam Injectable 2 milliGRAM(s) IV Push every 2 hours PRN Seizure activity         Vitals log        ICU Vital Signs Last 24 Hrs  T(C): 36.9 (17 Dec 2021 05:30), Max: 36.9 (17 Dec 2021 05:30)  T(F): 98.4 (17 Dec 2021 05:30), Max: 98.4 (17 Dec 2021 05:30)  HR: 74 (17 Dec 2021 06:54) (69 - 106)  BP: 141/81 (17 Dec 2021 06:00) (116/55 - 153/98)  BP(mean): 98 (17 Dec 2021 06:00) (75 - 116)  ABP: --  ABP(mean): --  RR: 21 (17 Dec 2021 06:00) (17 - 35)  SpO2: 96% (17 Dec 2021 06:54) (95% - 100%)       Mode: AC/ CMV (Assist Control/ Continuous Mandatory Ventilation)  RR (machine): 18  TV (machine): 400  FiO2: 40  PEEP: 5  ITime: 1  MAP: 12  PIP: 34      Input and Output:  I&O's Detail    16 Dec 2021 07:01  -  17 Dec 2021 07:00  --------------------------------------------------------  IN:    Enteral Tube Flush: 600 mL    Glucerna 1.5: 1000 mL    Midazolam: 10 mL  Total IN: 1610 mL    OUT:    Voided (mL): 400 mL  Total OUT: 400 mL    Total NET: 1210 mL          Lab Data    12-15    143  |  108  |  25<H>  ----------------------------<  158<H>  4.7   |  23  |  1.10    Ca    8.5      15 Dec 2021 07:27  Phos  3.4     12-15  Mg     2.0     12-15    TPro  6.1  /  Alb  2.2<L>  /  TBili  0.5  /  DBili  x   /  AST  21  /  ALT  21  /  AlkPhos  133<H>  12-15    ABG - ( 17 Dec 2021 05:46 )  pH, Arterial: 7.46  pH, Blood: x     /  pCO2: 34    /  pO2: 104   / HCO3: 24    / Base Excess: 0.4   /  SaO2: 98.5                    Review of Systems	      Objective     Physical Examination    heart s1s2  lung dec BS  abd soft      Pertinent Lab findings & Imaging      Annalise:  NO   Adequate UO     I&O's Detail    16 Dec 2021 07:01  -  17 Dec 2021 07:00  --------------------------------------------------------  IN:    Enteral Tube Flush: 600 mL    Glucerna 1.5: 1000 mL    Midazolam: 10 mL  Total IN: 1610 mL    OUT:    Voided (mL): 400 mL  Total OUT: 400 mL    Total NET: 1210 mL               Discussed with:     Cultures:	        Radiology

## 2021-12-17 NOTE — PROGRESS NOTE ADULT - SUBJECTIVE AND OBJECTIVE BOX
Patient is a 76y Female whom presented to the hospital with ckd and manasa     PAST MEDICAL & SURGICAL HISTORY:  Dementia of frontal lobe type    Aphasic stroke    Diabetes mellitus    Respiratory failure    Hypertension    GERD (gastroesophageal reflux disease)    Constipation    Respiratory failure    CVA (cerebral vascular accident)    HTN (hypertension)    DM (diabetes mellitus)    Advanced dementia    COVID-19 virus detected    Quadriplegia    Pneumonia    Type II diabetes mellitus    Hx of appendectomy    Gastrostomy in place    Tracheostomy in place    Tracheostomy tube present    Feeding by G-tube        MEDICATIONS  (STANDING):  ALBUTerol    90 MICROgram(s) HFA Inhaler 2 Puff(s) Inhalation every 6 hours  chlorhexidine 0.12% Liquid 15 milliLiter(s) Oral Mucosa every 12 hours  dextrose 40% Gel 15 Gram(s) Oral once  dextrose 5%. 1000 milliLiter(s) (50 mL/Hr) IV Continuous <Continuous>  dextrose 5%. 1000 milliLiter(s) (100 mL/Hr) IV Continuous <Continuous>  dextrose 50% Injectable 25 Gram(s) IV Push once  dextrose 50% Injectable 12.5 Gram(s) IV Push once  dextrose 50% Injectable 25 Gram(s) IV Push once  glucagon  Injectable 1 milliGRAM(s) IntraMuscular once  insulin lispro (ADMELOG) corrective regimen sliding scale   SubCutaneous every 6 hours  lactated ringers. 1000 milliLiter(s) (100 mL/Hr) IV Continuous <Continuous>  methocarbamol 250 milliGRAM(s) Oral two times a day  pantoprazole  Injectable 40 milliGRAM(s) IV Push every 12 hours  piperacillin/tazobactam IVPB.. 3.375 Gram(s) IV Intermittent every 8 hours  polyethylene glycol 3350 17 Gram(s) Oral every 12 hours  senna 2 Tablet(s) Oral at bedtime  simethicone 80 milliGRAM(s) Chew every 6 hours  sucralfate 1 Gram(s) Oral every 6 hours  tiotropium 18 MICROgram(s) Capsule 1 Capsule(s) Inhalation daily  vancomycin  IVPB 750 milliGRAM(s) IV Intermittent every 12 hours      Allergies    codeine (Hives)    Intolerances        SOCIAL HISTORY:  Denies ETOh,Smoking,     FAMILY HISTORY:  No pertinent family history in first degree relatives        REVIEW OF SYSTEMS:    unable to obtained a good review system                                                                                                            10.2   7.94  )-----------( 336      ( 17 Dec 2021 07:01 )             34.0       CBC Full  -  ( 17 Dec 2021 07:01 )  WBC Count : 7.94 K/uL  RBC Count : 4.04 M/uL  Hemoglobin : 10.2 g/dL  Hematocrit : 34.0 %  Platelet Count - Automated : 336 K/uL  Mean Cell Volume : 84.2 fl  Mean Cell Hemoglobin : 25.2 pg  Mean Cell Hemoglobin Concentration : 30.0 gm/dL  Auto Neutrophil # : x  Auto Lymphocyte # : x  Auto Monocyte # : x  Auto Eosinophil # : x  Auto Basophil # : x  Auto Neutrophil % : x  Auto Lymphocyte % : x  Auto Monocyte % : x  Auto Eosinophil % : x  Auto Basophil % : x      12-17    138  |  105  |  20  ----------------------------<  158<H>  4.7   |  24  |  0.88    Ca    8.9      17 Dec 2021 07:01  Phos  2.6     12-17  Mg     1.9     12-17    TPro  7.0  /  Alb  2.2<L>  /  TBili  0.4  /  DBili  x   /  AST  13  /  ALT  16  /  AlkPhos  132<H>  12-17      CAPILLARY BLOOD GLUCOSE      POCT Blood Glucose.: 171 mg/dL (17 Dec 2021 17:18)  POCT Blood Glucose.: 149 mg/dL (17 Dec 2021 11:51)  POCT Blood Glucose.: 168 mg/dL (17 Dec 2021 06:56)  POCT Blood Glucose.: 156 mg/dL (17 Dec 2021 00:11)      Vital Signs Last 24 Hrs  T(C): 36.9 (17 Dec 2021 16:00), Max: 37.2 (17 Dec 2021 12:49)  T(F): 98.4 (17 Dec 2021 16:00), Max: 98.9 (17 Dec 2021 12:49)  HR: 71 (17 Dec 2021 19:00) (66 - 106)  BP: 142/77 (17 Dec 2021 19:00) (120/64 - 171/87)  BP(mean): 97 (17 Dec 2021 19:00) (77 - 119)  RR: 18 (17 Dec 2021 19:00) (17 - 46)  SpO2: 100% (17 Dec 2021 19:00) (93% - 100%)                   PHYSICAL EXAM:    Constitutional: NAD  HEENT: conjunctive   clear   Neck:  No JVD  Respiratory: pos decrease bs pos trach  Cardiovascular: S1 and S2  Gastrointestinal: BS+, soft, pos peg   Extremities: No peripheral edema

## 2021-12-17 NOTE — PROGRESS NOTE ADULT - ASSESSMENT
75F with chronic resp failure - vent dependent, hx of subarachnoid hemorrhage, hx of cardiac arrest, dementia s/p PEG, HTN, DM2 on insulin, hx of CVA, GERD, COPD, admission here from 9/25 to 10/4 and 11/7-11/11 for respiratory failure/sepsis possibly 2/2 aspiration vs vent associated PNA who presents from [facility] for abnormal labs.  Patient does not contribute to history. In the ED, patient's initial triage vitals were BP 88/37, HR 81, RR 18, 100% on vent and T 99 F.  Labs showed leukocytosis of 12.18, HGB 5.2, BUN/Cr 89/2.00. CXR: Tracheostomy cannula reidentified in position. No change heart mediastinum. Widespread bilateral airspace disease slightly improved.  correlate for pulmonary edema and/or infection. No significant pleural effusion. No pneumothorax. Patient's hand projects over portion of the right base. CT chest and A/P pending  (08 Dec 2021 15:50)      ACUTE RENAL FAILURE: sodium chloride 0.9%. 1000 milliLiter(s) (65 mL/Hr  Serum creatinine is improving    There is no progression . No uremic symptoms  No evidence of anemia .  Fluid status stable.  Will continue to avoid nephrotoxic drugs.  Patient remains asymptomatic.   Continue current therapy.    f/u blood and urine cx,serial lactate levels,monitor vitals roddy raymundo hydration,monitor urine output and renal profile,    hypotension midodrine 5 milliGRAM(s) Oral every 8 hours

## 2021-12-17 NOTE — PROGRESS NOTE ADULT - ASSESSMENT
75 yo F sent from [facility] home with abnormal labs. Patient unable to contribute to history. Had labs drawn this afternoon and reportedly has a Hgb of 6. Patient does not have a vaughn catheter. No report of fever, vomiting, bleeding. Receiving IV Zosyn through PICC line rt arm    PEG replaced  ID note reviewed  CM notes reviewed -     was not able to transport for [facility] testing  Versed   on Zosyn  2 diff GI MDs notes reviewed - not a candidate for EGD  on TF - on IVF -   on Hep sq dvt p  GI follow up - serial Hgb  ct imaging reviewed - poss PNA -   vs noted  HD reviewed       HCP  Pt is full code  CCM and Cardio notes reviewed  lives in SNF  vent dep -   end stage dementia  trach and peg  HOB elev  asp prec  oral hygiene  skin care  assist with all needs - ADL  work up in progress

## 2021-12-18 LAB
ALBUMIN SERPL ELPH-MCNC: 2.2 G/DL — LOW (ref 3.3–5)
ALP SERPL-CCNC: 130 U/L — HIGH (ref 30–120)
ALT FLD-CCNC: 15 U/L DA — SIGNIFICANT CHANGE UP (ref 10–60)
ANION GAP SERPL CALC-SCNC: 8 MMOL/L — SIGNIFICANT CHANGE UP (ref 5–17)
AST SERPL-CCNC: 14 U/L — SIGNIFICANT CHANGE UP (ref 10–40)
BILIRUB SERPL-MCNC: 0.4 MG/DL — SIGNIFICANT CHANGE UP (ref 0.2–1.2)
BUN SERPL-MCNC: 18 MG/DL — SIGNIFICANT CHANGE UP (ref 7–23)
CALCIUM SERPL-MCNC: 8.8 MG/DL — SIGNIFICANT CHANGE UP (ref 8.4–10.5)
CHLORIDE SERPL-SCNC: 104 MMOL/L — SIGNIFICANT CHANGE UP (ref 96–108)
CO2 SERPL-SCNC: 26 MMOL/L — SIGNIFICANT CHANGE UP (ref 22–31)
CREAT SERPL-MCNC: 0.9 MG/DL — SIGNIFICANT CHANGE UP (ref 0.5–1.3)
GLUCOSE BLDC GLUCOMTR-MCNC: 127 MG/DL — HIGH (ref 70–99)
GLUCOSE BLDC GLUCOMTR-MCNC: 142 MG/DL — HIGH (ref 70–99)
GLUCOSE BLDC GLUCOMTR-MCNC: 145 MG/DL — HIGH (ref 70–99)
GLUCOSE BLDC GLUCOMTR-MCNC: 182 MG/DL — HIGH (ref 70–99)
GLUCOSE SERPL-MCNC: 152 MG/DL — HIGH (ref 70–99)
HCT VFR BLD CALC: 34.6 % — SIGNIFICANT CHANGE UP (ref 34.5–45)
HGB BLD-MCNC: 10.5 G/DL — LOW (ref 11.5–15.5)
MCHC RBC-ENTMCNC: 25.4 PG — LOW (ref 27–34)
MCHC RBC-ENTMCNC: 30.3 GM/DL — LOW (ref 32–36)
MCV RBC AUTO: 83.6 FL — SIGNIFICANT CHANGE UP (ref 80–100)
NRBC # BLD: 0 /100 WBCS — SIGNIFICANT CHANGE UP (ref 0–0)
PLATELET # BLD AUTO: 344 K/UL — SIGNIFICANT CHANGE UP (ref 150–400)
POTASSIUM SERPL-MCNC: 5 MMOL/L — SIGNIFICANT CHANGE UP (ref 3.5–5.3)
POTASSIUM SERPL-SCNC: 5 MMOL/L — SIGNIFICANT CHANGE UP (ref 3.5–5.3)
PROT SERPL-MCNC: 6.3 G/DL — SIGNIFICANT CHANGE UP (ref 6–8.3)
RBC # BLD: 4.14 M/UL — SIGNIFICANT CHANGE UP (ref 3.8–5.2)
RBC # FLD: 17 % — HIGH (ref 10.3–14.5)
SODIUM SERPL-SCNC: 138 MMOL/L — SIGNIFICANT CHANGE UP (ref 135–145)
WBC # BLD: 7.15 K/UL — SIGNIFICANT CHANGE UP (ref 3.8–10.5)
WBC # FLD AUTO: 7.15 K/UL — SIGNIFICANT CHANGE UP (ref 3.8–10.5)

## 2021-12-18 PROCEDURE — 99232 SBSQ HOSP IP/OBS MODERATE 35: CPT

## 2021-12-18 RX ADMIN — POLYETHYLENE GLYCOL 3350 17 GRAM(S): 17 POWDER, FOR SOLUTION ORAL at 18:39

## 2021-12-18 RX ADMIN — ALBUTEROL 2 PUFF(S): 90 AEROSOL, METERED ORAL at 07:48

## 2021-12-18 RX ADMIN — Medication 1 MILLIGRAM(S): at 06:28

## 2021-12-18 RX ADMIN — METHOCARBAMOL 500 MILLIGRAM(S): 500 TABLET, FILM COATED ORAL at 06:24

## 2021-12-18 RX ADMIN — POLYETHYLENE GLYCOL 3350 17 GRAM(S): 17 POWDER, FOR SOLUTION ORAL at 06:24

## 2021-12-18 RX ADMIN — PIPERACILLIN AND TAZOBACTAM 25 GRAM(S): 4; .5 INJECTION, POWDER, LYOPHILIZED, FOR SOLUTION INTRAVENOUS at 13:49

## 2021-12-18 RX ADMIN — Medication 1 GRAM(S): at 06:24

## 2021-12-18 RX ADMIN — Medication 1 GRAM(S): at 12:32

## 2021-12-18 RX ADMIN — CHLORHEXIDINE GLUCONATE 15 MILLILITER(S): 213 SOLUTION TOPICAL at 18:39

## 2021-12-18 RX ADMIN — Medication 1 GRAM(S): at 18:39

## 2021-12-18 RX ADMIN — ALBUTEROL 2 PUFF(S): 90 AEROSOL, METERED ORAL at 19:05

## 2021-12-18 RX ADMIN — SIMETHICONE 80 MILLIGRAM(S): 80 TABLET, CHEWABLE ORAL at 12:32

## 2021-12-18 RX ADMIN — Medication 1 MILLIGRAM(S): at 10:52

## 2021-12-18 RX ADMIN — Medication 1 MILLIGRAM(S): at 21:54

## 2021-12-18 RX ADMIN — SIMETHICONE 80 MILLIGRAM(S): 80 TABLET, CHEWABLE ORAL at 18:39

## 2021-12-18 RX ADMIN — HEPARIN SODIUM 5000 UNIT(S): 5000 INJECTION INTRAVENOUS; SUBCUTANEOUS at 06:28

## 2021-12-18 RX ADMIN — Medication 2: at 18:39

## 2021-12-18 RX ADMIN — SENNA PLUS 2 TABLET(S): 8.6 TABLET ORAL at 21:54

## 2021-12-18 RX ADMIN — ALBUTEROL 2 PUFF(S): 90 AEROSOL, METERED ORAL at 00:45

## 2021-12-18 RX ADMIN — Medication 1 MILLIGRAM(S): at 18:38

## 2021-12-18 RX ADMIN — METHOCARBAMOL 500 MILLIGRAM(S): 500 TABLET, FILM COATED ORAL at 18:39

## 2021-12-18 RX ADMIN — Medication 1 GRAM(S): at 23:55

## 2021-12-18 RX ADMIN — PANTOPRAZOLE SODIUM 40 MILLIGRAM(S): 20 TABLET, DELAYED RELEASE ORAL at 18:38

## 2021-12-18 RX ADMIN — CHLORHEXIDINE GLUCONATE 15 MILLILITER(S): 213 SOLUTION TOPICAL at 06:27

## 2021-12-18 RX ADMIN — PANTOPRAZOLE SODIUM 40 MILLIGRAM(S): 20 TABLET, DELAYED RELEASE ORAL at 06:28

## 2021-12-18 RX ADMIN — Medication 1 MILLIGRAM(S): at 13:49

## 2021-12-18 RX ADMIN — SIMETHICONE 80 MILLIGRAM(S): 80 TABLET, CHEWABLE ORAL at 06:27

## 2021-12-18 RX ADMIN — SIMETHICONE 80 MILLIGRAM(S): 80 TABLET, CHEWABLE ORAL at 23:55

## 2021-12-18 RX ADMIN — HEPARIN SODIUM 5000 UNIT(S): 5000 INJECTION INTRAVENOUS; SUBCUTANEOUS at 18:38

## 2021-12-18 RX ADMIN — PIPERACILLIN AND TAZOBACTAM 25 GRAM(S): 4; .5 INJECTION, POWDER, LYOPHILIZED, FOR SOLUTION INTRAVENOUS at 06:23

## 2021-12-18 RX ADMIN — ALBUTEROL 2 PUFF(S): 90 AEROSOL, METERED ORAL at 12:58

## 2021-12-18 RX ADMIN — Medication 1 MILLIGRAM(S): at 01:30

## 2021-12-18 NOTE — PROGRESS NOTE ADULT - SUBJECTIVE AND OBJECTIVE BOX
Patient is a 76y Female with a known history of :  Uncontrolled type 2 diabetes mellitus [E11.65]    Anemia of chronic disease [D63.8]      HPI:  ***Patient with dementia and is not responsive.  Collateral information obtained from chart and notes.***    75F with chronic resp failure - vent dependent, hx of subarachnoid hemorrhage, hx of cardiac arrest, dementia s/p PEG, HTN, DM2 on insulin, hx of CVA, GERD, COPD, admission here from 9/25 to 10/4 and 11/7-11/11 for respiratory failure/sepsis possibly 2/2 aspiration vs vent associated PNA who presents from [facility] for abnormal labs.  Patient does not contribute to history. In the ED, patient's initial triage vitals were BP 88/37, HR 81, RR 18, 100% on vent and T 99 F.  Labs showed leukocytosis of 12.18, HGB 5.2, BUN/Cr 89/2.00. CXR: Tracheostomy cannula reidentified in position. No change heart mediastinum. Widespread bilateral airspace disease slightly improved.  correlate for pulmonary edema and/or infection. No significant pleural effusion. No pneumothorax. Patient's hand projects over portion of the right base. CT chest and A/P pending  (08 Dec 2021 15:50)      REVIEW OF SYSTEMS:    CONSTITUTIONAL: No fever, weight loss, or fatigue  EYES: No eye pain, visual disturbances, or discharge  ENMT:  No difficulty hearing, tinnitus, vertigo; No sinus or throat pain  NECK: No pain or stiffness  BREASTS: No pain, masses, or nipple discharge  RESPIRATORY: No cough, wheezing, chills or hemoptysis; No shortness of breath  CARDIOVASCULAR: No chest pain, palpitations, dizziness, or leg swelling  GASTROINTESTINAL: No abdominal or epigastric pain. No nausea, vomiting, or hematemesis; No diarrhea or constipation. No melena or hematochezia.  GENITOURINARY: No dysuria, frequency, hematuria, or incontinence  NEUROLOGICAL: No headaches, memory loss, loss of strength, numbness, or tremors  SKIN: No itching, burning, rashes, or lesions   LYMPH NODES: No enlarged glands  ENDOCRINE: No heat or cold intolerance; No hair loss  MUSCULOSKELETAL: No joint pain or swelling; No muscle, back, or extremity pain  PSYCHIATRIC: No depression, anxiety, mood swings, or difficulty sleeping  HEME/LYMPH: No easy bruising, or bleeding gums  ALLERGY AND IMMUNOLOGIC: No hives or eczema    MEDICATIONS  (STANDING):  ALBUTerol    90 MICROgram(s) HFA Inhaler 2 Puff(s) Inhalation every 6 hours  chlorhexidine 0.12% Liquid 15 milliLiter(s) Oral Mucosa every 12 hours  dextrose 40% Gel 15 Gram(s) Oral once  dextrose 5%. 1000 milliLiter(s) (50 mL/Hr) IV Continuous <Continuous>  dextrose 5%. 1000 milliLiter(s) (100 mL/Hr) IV Continuous <Continuous>  dextrose 50% Injectable 25 Gram(s) IV Push once  dextrose 50% Injectable 12.5 Gram(s) IV Push once  dextrose 50% Injectable 25 Gram(s) IV Push once  glucagon  Injectable 1 milliGRAM(s) IntraMuscular once  heparin   Injectable 5000 Unit(s) SubCutaneous every 12 hours  insulin lispro (ADMELOG) corrective regimen sliding scale   SubCutaneous every 6 hours  LORazepam     Tablet 1 milliGRAM(s) Oral every 4 hours  methocarbamol 500 milliGRAM(s) Oral two times a day  pantoprazole  Injectable 40 milliGRAM(s) IV Push every 12 hours  piperacillin/tazobactam IVPB.. 3.375 Gram(s) IV Intermittent every 8 hours  polyethylene glycol 3350 17 Gram(s) Oral every 12 hours  senna 2 Tablet(s) Oral at bedtime  simethicone 80 milliGRAM(s) Chew every 6 hours  sucralfate suspension 1 Gram(s) Enteral Tube every 6 hours    MEDICATIONS  (PRN):  acetaminophen     Tablet .. 650 milliGRAM(s) Oral every 6 hours PRN Temp greater or equal to 38C (100.4F), Mild Pain (1 - 3)  midazolam Injectable 2 milliGRAM(s) IV Push every 2 hours PRN Seizure activity      ALLERGIES: codeine (Hives)      FAMILY HISTORY:  No pertinent family history in first degree relatives        Social history:  Alochol:   Smoking:   Drug Use:   Marital Status:     PHYSICAL EXAMINATION:  -----------------------------  T(C): 36.8 (12-18-21 @ 12:49), Max: 37.2 (12-17-21 @ 21:16)  HR: 72 (12-18-21 @ 15:07) (67 - 96)  BP: 155/71 (12-18-21 @ 14:00) (134/64 - 171/87)  RR: 18 (12-18-21 @ 14:00) (18 - 46)  SpO2: 100% (12-18-21 @ 15:07) (95% - 100%)  Wt(kg): --    12-17 @ 07:01  -  12-18 @ 07:00  --------------------------------------------------------  IN:    Enteral Tube Flush: 600 mL    Glucerna 1.5: 1200 mL    IV PiggyBack: 100 mL  Total IN: 1900 mL    OUT:    Voided (mL): 2150 mL  Total OUT: 2150 mL    Total NET: -250 mL            Constitutional: well developed, normal appearance, well groomed, well nourished, no deformities and no acute distress.   Eyes: the conjunctiva exhibited no abnormalities and the eyelids demonstrated no xanthelasmas.   HEENT: normal oral mucosa, no oral pallor and no oral cyanosis.   Neck: normal jugular venous A waves present, normal jugular venous V waves present and no jugular venous obregon A waves.   Pulmonary: no respiratory distress, normal respiratory rhythm and effort, no accessory muscle use and lungs were clear to auscultation bilaterally. Anteriorly  Cardiovascular: heart rate and rhythm were normal, normal S1 and S2 and no murmur, gallop, rub, heave or thrill are present.   Musculoskeletal: the gait could not be assessed.   Extremities: no clubbing of the fingernails, no localized cyanosis, no petechial hemorrhages and no ischemic changes.   Skin: normal skin color and pigmentation, no rash, no venous stasis, no skin lesions, no skin ulcer and no xanthoma was observed.      LABS:   --------  12-18    138  |  104  |  18  ----------------------------<  152<H>  5.0   |  26  |  0.90    Ca    8.8      18 Dec 2021 06:24  Phos  2.6     12-17  Mg     1.9     12-17    TPro  6.3  /  Alb  2.2<L>  /  TBili  0.4  /  DBili  x   /  AST  14  /  ALT  15  /  AlkPhos  130<H>  12-18                         10.5   7.15  )-----------( 344      ( 18 Dec 2021 06:24 )             34.6                   Radiology:

## 2021-12-18 NOTE — PROGRESS NOTE ADULT - ASSESSMENT
This is a 76-year-old with h/o acrdiac arrest, Trach and PEG episodes of altered mental status and a history of shaking.  Metabolic encephalopathy secondary to underlying infectious type process and possibly underlying pneumonia.  For episodes of abnormal movements - nonepileptic in nature.    Supportive care.  Poor quality of life.  Poor prognosis.    Greater than 30 minutes of time was spent with the patient, plan of care, reviewing data, and speaking to the multidisciplinary healthcare team with greater than 50% of that time in counseling and care coordination.

## 2021-12-18 NOTE — PROGRESS NOTE ADULT - SUBJECTIVE AND OBJECTIVE BOX
Subjective: Patient seen and examined. No overnight events.     MEDICATIONS  (STANDING):  ALBUTerol    90 MICROgram(s) HFA Inhaler 2 Puff(s) Inhalation every 6 hours  chlorhexidine 0.12% Liquid 15 milliLiter(s) Oral Mucosa every 12 hours  dextrose 40% Gel 15 Gram(s) Oral once  dextrose 5%. 1000 milliLiter(s) (50 mL/Hr) IV Continuous <Continuous>  dextrose 5%. 1000 milliLiter(s) (100 mL/Hr) IV Continuous <Continuous>  dextrose 50% Injectable 25 Gram(s) IV Push once  dextrose 50% Injectable 12.5 Gram(s) IV Push once  dextrose 50% Injectable 25 Gram(s) IV Push once  glucagon  Injectable 1 milliGRAM(s) IntraMuscular once  heparin   Injectable 5000 Unit(s) SubCutaneous every 12 hours  insulin lispro (ADMELOG) corrective regimen sliding scale   SubCutaneous every 6 hours  LORazepam     Tablet 1 milliGRAM(s) Oral every 4 hours  methocarbamol 500 milliGRAM(s) Oral two times a day  pantoprazole  Injectable 40 milliGRAM(s) IV Push every 12 hours  piperacillin/tazobactam IVPB.. 3.375 Gram(s) IV Intermittent every 8 hours  polyethylene glycol 3350 17 Gram(s) Oral every 12 hours  senna 2 Tablet(s) Oral at bedtime  simethicone 80 milliGRAM(s) Chew every 6 hours  sucralfate suspension 1 Gram(s) Enteral Tube every 6 hours    MEDICATIONS  (PRN):  acetaminophen     Tablet .. 650 milliGRAM(s) Oral every 6 hours PRN Temp greater or equal to 38C (100.4F), Mild Pain (1 - 3)  midazolam Injectable 2 milliGRAM(s) IV Push every 2 hours PRN Seizure activity      Allergies    codeine (Hives)    Intolerances        Vital Signs Last 24 Hrs  T(C): 36.7 (18 Dec 2021 08:43), Max: 37.2 (17 Dec 2021 12:49)  T(F): 98 (18 Dec 2021 08:43), Max: 99 (17 Dec 2021 21:16)  HR: 82 (18 Dec 2021 11:04) (67 - 96)  BP: 141/75 (18 Dec 2021 08:00) (123/68 - 171/87)  BP(mean): 96 (18 Dec 2021 08:00) (84 - 119)  RR: 18 (18 Dec 2021 08:00) (18 - 46)  SpO2: 95% (18 Dec 2021 11:04) (93% - 100%)    PHYSICAL EXAM:  GENERAL: No Distress; Sedated and Trach to Vent   HEAD:  Atraumatic, Normocephalic  ENMT: Moist mucous membranes,   NECK: Trach to Vent  CHEST/LUNG: Coarse BS Bilaterally   HEART: Regular rate and rhythm; No murmurs, rubs, or gallops  ABDOMEN: Soft; + PEG TUBE   EXTREMITIES:  2+ Peripheral Pulses, No clubbing, cyanosis, or edema      LABS:                        10.5   7.15  )-----------( 344      ( 18 Dec 2021 06:24 )             34.6     18 Dec 2021 06:24    138    |  104    |  18     ----------------------------<  152    5.0     |  26     |  0.90     Ca    8.8        18 Dec 2021 06:24    TPro  6.3    /  Alb  2.2    /  TBili  0.4    /  DBili  x      /  AST  14     /  ALT  15     /  AlkPhos  130    18 Dec 2021 06:24        CAPILLARY BLOOD GLUCOSE      POCT Blood Glucose.: 142 mg/dL (18 Dec 2021 06:41)  POCT Blood Glucose.: 150 mg/dL (17 Dec 2021 23:32)  POCT Blood Glucose.: 171 mg/dL (17 Dec 2021 17:18)      RADIOLOGY & ADDITIONAL TESTS:    Imaging Personally Reviewed:  [ ] YES     Consultant(s) Notes Reviewed:      Care Discussed with Consultants/Other Providers:    Advanced Directives: [ ] DNR  [ ] No feeding tube  [ ] MOLST in chart  [ ] MOLST completed today  [ ] Unknown

## 2021-12-18 NOTE — PROGRESS NOTE ADULT - SUBJECTIVE AND OBJECTIVE BOX
Date/Time Patient Seen:  		  Referring MD:   Data Reviewed	       Patient is a 76y old  Female who presents with a chief complaint of Anemia (17 Dec 2021 20:58)      Subjective/HPI     PAST MEDICAL & SURGICAL HISTORY:  Dementia of frontal lobe type    Aphasic stroke    Diabetes mellitus    Respiratory failure    Hypertension    GERD (gastroesophageal reflux disease)    Constipation    Respiratory failure    CVA (cerebral vascular accident)    HTN (hypertension)    DM (diabetes mellitus)    Advanced dementia    COVID-19 virus detected    Quadriplegia    Pneumonia    Type II diabetes mellitus    Hx of appendectomy    Gastrostomy in place    Tracheostomy in place    Tracheostomy tube present    Feeding by G-tube          Medication list         MEDICATIONS  (STANDING):  ALBUTerol    90 MICROgram(s) HFA Inhaler 2 Puff(s) Inhalation every 6 hours  chlorhexidine 0.12% Liquid 15 milliLiter(s) Oral Mucosa every 12 hours  dextrose 40% Gel 15 Gram(s) Oral once  dextrose 5%. 1000 milliLiter(s) (50 mL/Hr) IV Continuous <Continuous>  dextrose 5%. 1000 milliLiter(s) (100 mL/Hr) IV Continuous <Continuous>  dextrose 50% Injectable 25 Gram(s) IV Push once  dextrose 50% Injectable 12.5 Gram(s) IV Push once  dextrose 50% Injectable 25 Gram(s) IV Push once  glucagon  Injectable 1 milliGRAM(s) IntraMuscular once  heparin   Injectable 5000 Unit(s) SubCutaneous every 12 hours  insulin lispro (ADMELOG) corrective regimen sliding scale   SubCutaneous every 6 hours  LORazepam     Tablet 1 milliGRAM(s) Oral every 4 hours  methocarbamol 500 milliGRAM(s) Oral two times a day  pantoprazole  Injectable 40 milliGRAM(s) IV Push every 12 hours  piperacillin/tazobactam IVPB.. 3.375 Gram(s) IV Intermittent every 8 hours  polyethylene glycol 3350 17 Gram(s) Oral every 12 hours  senna 2 Tablet(s) Oral at bedtime  simethicone 80 milliGRAM(s) Chew every 6 hours  sucralfate suspension 1 Gram(s) Enteral Tube every 6 hours    MEDICATIONS  (PRN):  acetaminophen     Tablet .. 650 milliGRAM(s) Oral every 6 hours PRN Temp greater or equal to 38C (100.4F), Mild Pain (1 - 3)  midazolam Injectable 2 milliGRAM(s) IV Push every 2 hours PRN Seizure activity         Vitals log        ICU Vital Signs Last 24 Hrs  T(C): 37.1 (18 Dec 2021 05:03), Max: 37.2 (17 Dec 2021 12:49)  T(F): 98.7 (18 Dec 2021 05:03), Max: 99 (17 Dec 2021 21:16)  HR: 73 (18 Dec 2021 07:53) (66 - 96)  BP: 141/69 (18 Dec 2021 07:00) (120/64 - 171/87)  BP(mean): 91 (18 Dec 2021 07:00) (81 - 119)  ABP: --  ABP(mean): --  RR: 19 (18 Dec 2021 07:00) (18 - 46)  SpO2: 100% (18 Dec 2021 07:53) (93% - 100%)       Mode: AC/ CMV (Assist Control/ Continuous Mandatory Ventilation)  RR (machine): 18  TV (machine): 400  FiO2: 40  PEEP: 5  ITime: 1  MAP: 11  PIP: 40      Input and Output:  I&O's Detail    17 Dec 2021 07:01  -  18 Dec 2021 07:00  --------------------------------------------------------  IN:    Enteral Tube Flush: 600 mL    Glucerna 1.5: 1200 mL    IV PiggyBack: 100 mL  Total IN: 1900 mL    OUT:    Voided (mL): 2150 mL  Total OUT: 2150 mL    Total NET: -250 mL          Lab Data                        10.5   7.15  )-----------( 344      ( 18 Dec 2021 06:24 )             34.6     12-18    138  |  104  |  18  ----------------------------<  152<H>  5.0   |  26  |  0.90    Ca    8.8      18 Dec 2021 06:24  Phos  2.6     12-17  Mg     1.9     12-17    TPro  6.3  /  Alb  2.2<L>  /  TBili  0.4  /  DBili  x   /  AST  14  /  ALT  15  /  AlkPhos  130<H>  12-18    ABG - ( 17 Dec 2021 05:46 )  pH, Arterial: 7.46  pH, Blood: x     /  pCO2: 34    /  pO2: 104   / HCO3: 24    / Base Excess: 0.4   /  SaO2: 98.5                    Review of Systems	      Objective     Physical Examination    trach  peg  heart s1s2  lung dec BS      Pertinent Lab findings & Imaging      Annalise:  NO   Adequate UO     I&O's Detail    17 Dec 2021 07:01  -  18 Dec 2021 07:00  --------------------------------------------------------  IN:    Enteral Tube Flush: 600 mL    Glucerna 1.5: 1200 mL    IV PiggyBack: 100 mL  Total IN: 1900 mL    OUT:    Voided (mL): 2150 mL  Total OUT: 2150 mL    Total NET: -250 mL               Discussed with:     Cultures:	        Radiology

## 2021-12-18 NOTE — PROGRESS NOTE ADULT - SUBJECTIVE AND OBJECTIVE BOX
Patient is a 76y Female whom presented to the hospital with ckd and manasa     PAST MEDICAL & SURGICAL HISTORY:  Dementia of frontal lobe type    Aphasic stroke    Diabetes mellitus    Respiratory failure    Hypertension    GERD (gastroesophageal reflux disease)    Constipation    Respiratory failure    CVA (cerebral vascular accident)    HTN (hypertension)    DM (diabetes mellitus)    Advanced dementia    COVID-19 virus detected    Quadriplegia    Pneumonia    Type II diabetes mellitus    Hx of appendectomy    Gastrostomy in place    Tracheostomy in place    Tracheostomy tube present    Feeding by G-tube        MEDICATIONS  (STANDING):  ALBUTerol    90 MICROgram(s) HFA Inhaler 2 Puff(s) Inhalation every 6 hours  chlorhexidine 0.12% Liquid 15 milliLiter(s) Oral Mucosa every 12 hours  dextrose 40% Gel 15 Gram(s) Oral once  dextrose 5%. 1000 milliLiter(s) (50 mL/Hr) IV Continuous <Continuous>  dextrose 5%. 1000 milliLiter(s) (100 mL/Hr) IV Continuous <Continuous>  dextrose 50% Injectable 25 Gram(s) IV Push once  dextrose 50% Injectable 12.5 Gram(s) IV Push once  dextrose 50% Injectable 25 Gram(s) IV Push once  glucagon  Injectable 1 milliGRAM(s) IntraMuscular once  insulin lispro (ADMELOG) corrective regimen sliding scale   SubCutaneous every 6 hours  lactated ringers. 1000 milliLiter(s) (100 mL/Hr) IV Continuous <Continuous>  methocarbamol 250 milliGRAM(s) Oral two times a day  pantoprazole  Injectable 40 milliGRAM(s) IV Push every 12 hours  piperacillin/tazobactam IVPB.. 3.375 Gram(s) IV Intermittent every 8 hours  polyethylene glycol 3350 17 Gram(s) Oral every 12 hours  senna 2 Tablet(s) Oral at bedtime  simethicone 80 milliGRAM(s) Chew every 6 hours  sucralfate 1 Gram(s) Oral every 6 hours  tiotropium 18 MICROgram(s) Capsule 1 Capsule(s) Inhalation daily  vancomycin  IVPB 750 milliGRAM(s) IV Intermittent every 12 hours      Allergies    codeine (Hives)    Intolerances        SOCIAL HISTORY:  Denies ETOh,Smoking,     FAMILY HISTORY:  No pertinent family history in first degree relatives        REVIEW OF SYSTEMS:    unable to obtained a good review system                                     10.5   7.15  )-----------( 344      ( 18 Dec 2021 06:24 )             34.6       CBC Full  -  ( 18 Dec 2021 06:24 )  WBC Count : 7.15 K/uL  RBC Count : 4.14 M/uL  Hemoglobin : 10.5 g/dL  Hematocrit : 34.6 %  Platelet Count - Automated : 344 K/uL  Mean Cell Volume : 83.6 fl  Mean Cell Hemoglobin : 25.4 pg  Mean Cell Hemoglobin Concentration : 30.3 gm/dL  Auto Neutrophil # : x  Auto Lymphocyte # : x  Auto Monocyte # : x  Auto Eosinophil # : x  Auto Basophil # : x  Auto Neutrophil % : x  Auto Lymphocyte % : x  Auto Monocyte % : x  Auto Eosinophil % : x  Auto Basophil % : x      12-18    138  |  104  |  18  ----------------------------<  152<H>  5.0   |  26  |  0.90    Ca    8.8      18 Dec 2021 06:24  Phos  2.6     12-17  Mg     1.9     12-17    TPro  6.3  /  Alb  2.2<L>  /  TBili  0.4  /  DBili  x   /  AST  14  /  ALT  15  /  AlkPhos  130<H>  12-18      CAPILLARY BLOOD GLUCOSE      POCT Blood Glucose.: 145 mg/dL (18 Dec 2021 12:30)  POCT Blood Glucose.: 142 mg/dL (18 Dec 2021 06:41)  POCT Blood Glucose.: 150 mg/dL (17 Dec 2021 23:32)  POCT Blood Glucose.: 171 mg/dL (17 Dec 2021 17:18)      Vital Signs Last 24 Hrs  T(C): 36.8 (18 Dec 2021 12:49), Max: 37.2 (17 Dec 2021 21:16)  T(F): 98.3 (18 Dec 2021 12:49), Max: 99 (17 Dec 2021 21:16)  HR: 72 (18 Dec 2021 15:07) (67 - 96)  BP: 155/71 (18 Dec 2021 14:00) (134/64 - 171/87)  BP(mean): 94 (18 Dec 2021 14:00) (84 - 107)  RR: 18 (18 Dec 2021 14:00) (18 - 46)  SpO2: 100% (18 Dec 2021 15:07) (95% - 100%)                                                                            PHYSICAL EXAM:    Constitutional: NAD  Neck:  No JVD  Respiratory: pos decrease bs pos trach  Cardiovascular: S1 and S2  Gastrointestinal: BS+, soft, pos peg   Extremities: No peripheral edema

## 2021-12-18 NOTE — PROGRESS NOTE ADULT - SUBJECTIVE AND OBJECTIVE BOX
Neurology Follow up note    Covering Dr. Kip BECKHAM, BREA 76y Female    HPI: ***Patient with dementia and is not responsive.  Collateral information obtained from chart and notes.***    75F with chronic resp failure - Trached - vent dependent, hx of subarachnoid hemorrhage, hx of cardiac arrest, dementia s/p PEG, HTN, DM2 on insulin, hx of CVA, GERD, COPD, admission here from 9/25 to 10/4 and 11/7-11/11 for respiratory failure/sepsis possibly 2/2 aspiration vs vent associated PNA who presents from [facility] for abnormal labs.  Patient does not contribute to history. In the ED, patient's initial triage vitals were BP 88/37, HR 81, RR 18, 100% on vent and T 99 F.  Labs showed leukocytosis of 12.18, HGB 5.2, BUN/Cr 89/2.00. CXR: Tracheostomy cannula reidentified in position. No change heart mediastinum. Widespread bilateral airspace disease slightly improved.  correlate for pulmonary edema and/or infection. No significant pleural effusion. No pneumothorax. Patient's hand projects over portion of the right base. CT chest and A/P pending  (08 Dec 2021 15:50)    Interval History -    Patient is seen, chart was reviewed and case was discussed with the treatment team.    Vital Signs Last 24 Hrs  T(C): 36.8 (18 Dec 2021 12:49), Max: 37.2 (17 Dec 2021 21:16)  T(F): 98.3 (18 Dec 2021 12:49), Max: 99 (17 Dec 2021 21:16)  HR: 72 (18 Dec 2021 13:00) (67 - 96)  BP: 151/67 (18 Dec 2021 12:00) (134/64 - 171/87)  BP(mean): 91 (18 Dec 2021 12:00) (84 - 119)  RR: 24 (18 Dec 2021 12:00) (18 - 46)  SpO2: 99% (18 Dec 2021 13:00) (93% - 100%)    MEDICATIONS    acetaminophen     Tablet .. 650 milliGRAM(s) Oral every 6 hours PRN  ALBUTerol    90 MICROgram(s) HFA Inhaler 2 Puff(s) Inhalation every 6 hours  chlorhexidine 0.12% Liquid 15 milliLiter(s) Oral Mucosa every 12 hours  dextrose 40% Gel 15 Gram(s) Oral once  dextrose 5%. 1000 milliLiter(s) IV Continuous <Continuous>  dextrose 5%. 1000 milliLiter(s) IV Continuous <Continuous>  dextrose 50% Injectable 25 Gram(s) IV Push once  dextrose 50% Injectable 12.5 Gram(s) IV Push once  dextrose 50% Injectable 25 Gram(s) IV Push once  glucagon  Injectable 1 milliGRAM(s) IntraMuscular once  heparin   Injectable 5000 Unit(s) SubCutaneous every 12 hours  insulin lispro (ADMELOG) corrective regimen sliding scale   SubCutaneous every 6 hours  LORazepam     Tablet 1 milliGRAM(s) Oral every 4 hours  methocarbamol 500 milliGRAM(s) Oral two times a day  midazolam Injectable 2 milliGRAM(s) IV Push every 2 hours PRN  pantoprazole  Injectable 40 milliGRAM(s) IV Push every 12 hours  piperacillin/tazobactam IVPB.. 3.375 Gram(s) IV Intermittent every 8 hours  polyethylene glycol 3350 17 Gram(s) Oral every 12 hours  senna 2 Tablet(s) Oral at bedtime  simethicone 80 milliGRAM(s) Chew every 6 hours  sucralfate suspension 1 Gram(s) Enteral Tube every 6 hours    Allergies    codeine (Hives)      PHYSICAL EXAMINATION:        HEENT:  Head:  Normocephalic.  Eyes:  No scleral icterus.  Ears:  Hard to evaluate secondary to the patient being nonverbal.  NECK:  Had increased tone, tracheostomy was in place.    RESPIRATORY:  Decreased breath sounds bilaterally.    CARDIOVASCULAR:  S1 and S2 heard.    ABDOMEN:  Soft and nontender.  PEG in place.  EXTREMITIES:  No clubbing or cyanosis was noted.      NEUROLOGIC:      Trached on vent.  Resting in bed with eyes open.  Upon stimulation of the patient, her eyes would roll up, with subtle shaking.    Pupils are 2.5 mm reacting to light. No obvious facial asymmetry  Speech:  Nonverbal.    Moves extremities sightly on stimulation. Tone in all four extremities increased.  Bilateral upper extremities were in a flexed position.  Bilateral lower extremities were in the straight position.  DTR 2 plus.  Neck is supple.      LABS:    CBC Full  -  ( 18 Dec 2021 06:24 )  WBC Count : 7.15 K/uL  RBC Count : 4.14 M/uL  Hemoglobin : 10.5 g/dL  Hematocrit : 34.6 %  Platelet Count - Automated : 344 K/uL  Mean Cell Volume : 83.6 fl  Mean Cell Hemoglobin : 25.4 pg  Mean Cell Hemoglobin Concentration : 30.3 gm/dL    12-18    138  |  104  |  18  ----------------------------<  152<H>  5.0   |  26  |  0.90    Ca    8.8      18 Dec 2021 06:24  Phos  2.6     12-17  Mg     1.9     12-17    TPro  6.3  /  Alb  2.2<L>  /  TBili  0.4  /  DBili  x   /  AST  14  /  ALT  15  /  AlkPhos  130<H>  12-18    Radiology -     < from: CT Abdomen and Pelvis No Cont (12.08.21 @ 16:52) >    Limited study without IV contrast.    CHEST:    1.Tracheostomy. Distal airways impaction and multifocal pneumonia,   possibly related to aspiration. Most of the opacities are either new or   unchanged since prior study. However, volume loss of the right lower lobe   has improved since 11/7/2021. Small left pleural effusion and trace right   pleural effusion, similar to prior. Follow to resolution with repeat   chest CT in 4 weeks.  2. Esophagus is distended with air probable left-sided diverticulum at   the thyroid level, image 15 series 4, similar to prior.  3. Coronary artery calcification and mitral calcification. Low   attenuation of the blood pool of the heart may reflect anemia.    ABDOMEN/PELVIS:  1. Mild chest and abdominal wall soft tissue edema. Symmetric appearance   of the abdominal wall and retroperitoneal musculature, without evidence   of hematoma. Correlate with clinical status of the patient, serial   hemoglobin and hematocrit to determine need for follow-up postcontrast   imaging.  2. No hydronephrosis of the kidneys.    --- End of Report ---      < end of copied text >

## 2021-12-18 NOTE — PROGRESS NOTE ADULT - ASSESSMENT
The patient is a 76 year old female with a history of HTN, DM, CVA, dementia, chronic respiratory failure s/p trach, PEG, cardiac arrest who presents from NH with anemia.    12/18/21  Seen at [facility] ICU  Intubated, sleeping comfortably  Monitor-NSR    Plan:  - Recent echo with normal LV systolic function, mild pulm HTN  - Hemoglobin improved with transfusion  - Remain off of aspirin  - Seizures - EEG and neuro work-up as indicated  - CXR with bilateral infiltrates - aspiration? pulm edema?  - If O2 requirements worsen can do trial of diuretics  - Mechanical ventilation  - Overall poor prognosis. Comfort care would be most appropriate.

## 2021-12-18 NOTE — PROGRESS NOTE ADULT - SUBJECTIVE AND OBJECTIVE BOX
CAPILLARY BLOOD GLUCOSE      POCT Blood Glucose.: 145 mg/dL (18 Dec 2021 12:30)  POCT Blood Glucose.: 142 mg/dL (18 Dec 2021 06:41)  POCT Blood Glucose.: 150 mg/dL (17 Dec 2021 23:32)  POCT Blood Glucose.: 171 mg/dL (17 Dec 2021 17:18)      Vital Signs Last 24 Hrs  T(C): 36.8 (18 Dec 2021 12:49), Max: 37.2 (17 Dec 2021 21:16)  T(F): 98.3 (18 Dec 2021 12:49), Max: 99 (17 Dec 2021 21:16)  HR: 80 (18 Dec 2021 14:00) (67 - 96)  BP: 155/71 (18 Dec 2021 14:00) (134/64 - 171/87)  BP(mean): 94 (18 Dec 2021 14:00) (84 - 107)  RR: 18 (18 Dec 2021 14:00) (18 - 46)  SpO2: 99% (18 Dec 2021 14:00) (95% - 100%)      Respiratory: CTA B/L  CV: RRR, S1S2, no murmurs, rubs or gallops  Abdominal: Soft, NT, ND +BS, Last BM  Extremities: No edema, + peripheral pulses     12-18    138  |  104  |  18  ----------------------------<  152<H>  5.0   |  26  |  0.90    Ca    8.8      18 Dec 2021 06:24  Phos  2.6     12-17  Mg     1.9     12-17    TPro  6.3  /  Alb  2.2<L>  /  TBili  0.4  /  DBili  x   /  AST  14  /  ALT  15  /  AlkPhos  130<H>  12-18      dextrose 40% Gel 15 Gram(s) Oral once  dextrose 50% Injectable 25 Gram(s) IV Push once  dextrose 50% Injectable 12.5 Gram(s) IV Push once  dextrose 50% Injectable 25 Gram(s) IV Push once  glucagon  Injectable 1 milliGRAM(s) IntraMuscular once  insulin lispro (ADMELOG) corrective regimen sliding scale   SubCutaneous every 6 hours

## 2021-12-18 NOTE — PROGRESS NOTE ADULT - ASSESSMENT
75F with chronic resp failure - vent dependent, hx of subarachnoid hemorrhage, hx of cardiac arrest, dementia s/p PEG, HTN, DM2 on insulin, hx of CVA, GERD, COPD, admission here from 9/25 to 10/4 and 11/7-11/11 for respiratory failure/sepsis possibly 2/2 aspiration vs vent associated PNA who presents from [facility] for abnormal labs.  Patient does not contribute to history. In the ED, patient's initial triage vitals were BP 88/37, HR 81, RR 18, 100% on vent and T 99 F.  Labs showed leukocytosis of 12.18, HGB 5.2, BUN/Cr 89/2.00. CXR: Tracheostomy cannula reidentified in position. No change heart mediastinum. Widespread bilateral airspace disease slightly improved.  correlate for pulmonary edema and/or infection. No significant pleural effusion. No pneumothorax. Patient's hand projects over portion of the right base. CT chest and A/P pending  (08 Dec 2021 15:50)      ACUTE RENAL FAILURE: sodium chloride 0.9%. 1000 milliLiter(s) (65 mL/Hr  Serum creatinine is improving    There is no progression . No uremic symptoms  No evidence of anemia .  Fluid status stable.  Will continue to avoid nephrotoxic drugs.  Patient remains asymptomatic.   Continue current therapy.    f/u blood and urine cx,serial lactate levels,monitor vitals roddy raymundo hydration,monitor urine output and renal profile,    hypotension midodrine 5 milliGRAM(s) Oral every 8 hours

## 2021-12-18 NOTE — PROGRESS NOTE ADULT - ASSESSMENT
75F with chronic resp failure - vent dependent, hx of subarachnoid hemorrhage, hx of cardiac arrest, dementia s/p PEG, HTN, DM2 on insulin, hx of CVA, GERD, COPD, admission here from 9/25 to 10/4 and 11/7-11/11 for respiratory failure/sepsis possibly 2/2 aspiration vs vent associated PNA who presents from [facility] w leukocytosis of 12.18, and concerns for repeat aspiration PNA    CT-Distal airways impaction and multifocal pneumonia, possibly related to aspiration. Most of the opacities are either new or unchanged since prior study.     Recurrent VAP, Acute on Chronic Hypoxic Respiratory Failure  -infectious w/u reviewed  --12/11 RCx w/ CRE Pseudomonas, Stenotrophomonas and Serratia marcescens  s/p course of zosyn (12/8-12/14), s/p levofloxacin 750mg IV x 1 on 12/13  leukocytosis possible reactive 2/2 seizure vs aspiration pneumonia or pneumonitis during seizure  -imaging reviewed  CXR 12/14 w/ increased bibasilar infiltrates compared with 12/8; vettings back to FiO2 of 40%   CXR 12/16 w/ improved aeration of L lung, b/l lower lobe opacification; suspect progression of prior aspiration PNA which will take time to resolve on imaging vs pulmonary edema given rapid improvement on CXR  -trend temps/WBC  --leukocytosis resolved  -vent management per pulm/ICU  -zosyn restarted on 12/14; continue for now. Will likely continue for additional 5-7 day course pending clinical improvement     We will follow along in the care of this patient. Please call us at [phone] or text me directly on my cell# at [phone] with any concerns.

## 2021-12-18 NOTE — PROGRESS NOTE ADULT - ASSESSMENT
75F Chronic Respiratory Failure, Dementia, HTN, DM2, COPD, admitted for Acute Encephalopathy and Aspiration PNA    Aspiration PNA / Chronic Respiratory Failure / COPD - Chronic Respiratory Failure and Vent Dependant from history of SAH and Cardiac Arrest. Sepsis POA and on IV Zosyn.  Persistent leukocytosis. Albuterol PRN Pulmonary and Infectious Disease following.       Abnormal Body Movements - EEG was planned to rule out epileptic activity and pending due to availability at transfer facility. Currently on Ativan and weaning off Versed. Spouse feels this is due to GI and Oxygenation related issues but still wants EEG.  Had BM last night. Neurology following.     Anemia - Possible GI Bleed. GI consulted with no plans for scope.  Will monitor and transfuse as needed.     CKD 3 - Baseline Creatinine 1.0. Avoid Nephrotoxic agents and monitor BMP and electrolytes. Nephrology following.     PEG Tube Place Malfunction - PEG Tube replaced 12/13    GERD - PPI     Diet - Regular    DVT Prophylaxis - Heparin SC TID     Disposition -  Discharge planning pending weaning off Versed Infusion and EEG.

## 2021-12-18 NOTE — PROGRESS NOTE ADULT - ASSESSMENT
77 yo F sent from [facility] with abnormal labs. Patient unable to contribute to history. Had labs drawn this afternoon and reportedly has a Hgb of 6. Patient does not have a vaughn catheter. No report of fever, vomiting, bleeding. Receiving IV Zosyn through PICC line rt arm    PEG replaced  ID note reviewed  CM notes reviewed -     was not able to transport for [facility] testing  Versed - Ativan - regimen -   on Zosyn  2 diff GI MDs notes reviewed - not a candidate for EGD  on TF - s/p IVF -   on Hep sq dvt p  GI follow up - serial Hgb  ct imaging reviewed - poss PNA -   vs noted  HD reviewed       HCP  Pt is full code  CCM and Cardio notes reviewed  lives in SNF  vent dep -   end stage dementia  trach and peg  HOB elev  asp prec  oral hygiene  skin care  assist with all needs - ADL  work up in progress

## 2021-12-18 NOTE — PROGRESS NOTE ADULT - SUBJECTIVE AND OBJECTIVE BOX
Patient is a 76y old  Female who presents with a chief complaint of Anemia (18 Dec 2021 15:35)    BRIEF HOSPITAL COURSE:   HPI:  ***Patient with dementia and is not responsive.  Collateral information obtained from chart and notes.***    75F with chronic resp failure - vent dependent, hx of subarachnoid hemorrhage, hx of cardiac arrest, dementia s/p PEG, HTN, DM2 on insulin, hx of CVA, GERD, COPD, admission here from 9/25 to 10/4 and 11/7-11/11 for respiratory failure/sepsis possibly 2/2 aspiration vs vent associated PNA who presents from [facility] for abnormal labs.  Patient does not contribute to history. In the ED, patient's initial triage vitals were BP 88/37, HR 81, RR 18, 100% on vent and T 99 F.  Labs showed leukocytosis of 12.18, HGB 5.2, BUN/Cr 89/2.00. CXR: Tracheostomy cannula reidentified in position. No change heart mediastinum. Widespread bilateral airspace disease slightly improved.  correlate for pulmonary edema and/or infection. No significant pleural effusion. No pneumothorax. Patient's hand projects over portion of the right base. CT chest and A/P pending  (08 Dec 2021 15:50)    Events last 24 hours:   - Off versed infusion, on Ativan 1mg q4h for seizure activity   - Awaiting bed for cvEEG    Review of Systems:  Unable to assess given clinical condition     Review of Systems:  CONSTITUTIONAL: No fever, chills, or fatigue  EYES: No eye pain, visual disturbances, or discharge  ENMT:  No difficulty hearing, tinnitus, vertigo; No sinus or throat pain  NECK: No pain or stiffness  RESPIRATORY: No cough, wheezing, chills or hemoptysis; No shortness of breath  CARDIOVASCULAR: No chest pain, palpitations, dizziness, or leg swelling  GASTROINTESTINAL: No abdominal or epigastric pain. No nausea, vomiting, or hematemesis; No diarrhea or constipation. No melena or hematochezia.  GENITOURINARY: No dysuria, frequency, hematuria, or incontinence  NEUROLOGICAL: No headaches, memory loss, loss of strength, numbness, or tremors  SKIN: No itching, burning, rashes, or lesions   MUSCULOSKELETAL: No joint pain or swelling; No muscle, back, or extremity pain  PSYCHIATRIC: No depression, anxiety, mood swings, or difficulty sleeping    PAST MEDICAL & SURGICAL HISTORY:  Dementia of frontal lobe type  Aphasic stroke  Diabetes mellitus  Respiratory failure  Hypertension  GERD (gastroesophageal reflux disease)  Constipation  Respiratory failure  CVA (cerebral vascular accident)  HTN (hypertension)  DM (diabetes mellitus)  Advanced dementia  COVID-19 virus detected  Quadriplegia  Pneumonia  Type II diabetes mellitus  Hx of appendectomy  Gastrostomy in place  Tracheostomy in place  Tracheostomy tube present  Feeding by G-tube    Medications:  piperacillin/tazobactam IVPB.. 3.375 Gram(s) IV Intermittent every 8 hours  ALBUTerol    90 MICROgram(s) HFA Inhaler 2 Puff(s) Inhalation every 6 hours  acetaminophen     Tablet .. 650 milliGRAM(s) Oral every 6 hours PRN  LORazepam     Tablet 1 milliGRAM(s) Oral every 4 hours  methocarbamol 500 milliGRAM(s) Oral two times a day  midazolam Injectable 2 milliGRAM(s) IV Push every 2 hours PRN  heparin   Injectable 5000 Unit(s) SubCutaneous every 12 hours  pantoprazole  Injectable 40 milliGRAM(s) IV Push every 12 hours  polyethylene glycol 3350 17 Gram(s) Oral every 12 hours  senna 2 Tablet(s) Oral at bedtime  simethicone 80 milliGRAM(s) Chew every 6 hours  sucralfate suspension 1 Gram(s) Enteral Tube every 6 hours  dextrose 40% Gel 15 Gram(s) Oral once  dextrose 50% Injectable 25 Gram(s) IV Push once  dextrose 50% Injectable 12.5 Gram(s) IV Push once  dextrose 50% Injectable 25 Gram(s) IV Push once  glucagon  Injectable 1 milliGRAM(s) IntraMuscular once  insulin lispro (ADMELOG) corrective regimen sliding scale   SubCutaneous every 6 hours  dextrose 5%. 1000 milliLiter(s) IV Continuous <Continuous>  dextrose 5%. 1000 milliLiter(s) IV Continuous <Continuous>  chlorhexidine 0.12% Liquid 15 milliLiter(s) Oral Mucosa every 12 hours    Mode: AC/ CMV (Assist Control/ Continuous Mandatory Ventilation)  RR (machine): 18  TV (machine): 400  FiO2: 40  PEEP: 5  ITime: 1  MAP: 16  PIP: 40    ICU Vital Signs Last 24 Hrs  T(C): 37.2 (18 Dec 2021 15:47), Max: 37.2 (17 Dec 2021 21:16)  T(F): 99 (18 Dec 2021 15:47), Max: 99 (17 Dec 2021 21:16)  HR: 96 (18 Dec 2021 19:08) (67 - 97)  BP: 123/65 (18 Dec 2021 18:00) (118/54 - 155/71)  BP(mean): 82 (18 Dec 2021 18:00) (74 - 96)  RR: 18 (18 Dec 2021 18:00) (18 - 27)  SpO2: 96% (18 Dec 2021 19:08) (95% - 100%)    ABG - ( 17 Dec 2021 05:46 )  pH, Arterial: 7.46  pH, Blood: x     /  pCO2: 34    /  pO2: 104   / HCO3: 24    / Base Excess: 0.4   /  SaO2: 98.5      I&O's Detail    17 Dec 2021 07:01  -  18 Dec 2021 07:00  --------------------------------------------------------  IN:    Enteral Tube Flush: 600 mL    Glucerna 1.5: 1200 mL    IV PiggyBack: 100 mL  Total IN: 1900 mL    OUT:    Voided (mL): 2150 mL  Total OUT: 2150 mL    Total NET: -250 mL    LABS:                        10.5   7.15  )-----------( 344      ( 18 Dec 2021 06:24 )             34.6     12-18    138  |  104  |  18  ----------------------------<  152<H>  5.0   |  26  |  0.90    Ca    8.8      18 Dec 2021 06:24  Phos  2.6     12-17  Mg     1.9     12-17    TPro  6.3  /  Alb  2.2<L>  /  TBili  0.4  /  DBili  x   /  AST  14  /  ALT  15  /  AlkPhos  130<H>  12-18    POCT Blood Glucose.: 182 mg/dL (18 Dec 2021 18:15)    CULTURES:  Culture Results:   No growth to date. (12-14 @ 22:29)    Physical Examination:  General: No acute distress.    HEENT: Pupils equal, reactive to light.  Symmetric.  PULM: Clear to auscultation bilaterally, no significant sputum production  NECK: Supple, no lymphadenopathy, trachea midline  CVS: Regular rate and rhythm, no murmurs, rubs, or gallops  ABD: Soft, nondistended, nontender, normoactive bowel sounds, no masses  EXT: No edema, nontender  SKIN: Warm and well perfused, no rashes noted.  NEURO: On mechanical ventilation and sedated   RADIOLOGY:   < from: Xray Chest 1 View- PORTABLE-Routine (Xray Chest 1 View- PORTABLE-Routine in AM.) (12.15.21 @ 06:56) >    ACC: [id number] EXAM:  XR CHEST PORTABLE ROUTINE 1V                          PROCEDURE DATE:  12/15/2021      INTERPRETATION:  Chest one view    HISTORY: Pneumonia    COMPARISON STUDY: 12/14/2021    Frontal expiratory view of the chest shows the heart to be normal in   size. Tracheostomy tube is again noted. Distended air-filled structure in   the lower neck likely indicates distended esophagus.    The lungs show slight clearing of the lungs with reduction of left   effusion and there is no evidence of pneumothorax nor right pleural   effusion. Incidentally noted is surgical hardware in the patient's   overlying left forearm.    IMPRESSION:  Partial clearing of the lungs. Distended upper esophagus. Clinical   correlation is suggested.    Thank you for the courtesy of this referral.    --- End of Report ---    ELLE PATTEN MD; Attending Interventional Radiologist  This document has been electronically signed. Dec 16 2021 11:26AM    < end of copied text >       Patient is a 76y old  Female who presents with a chief complaint of Anemia (18 Dec 2021 15:35)    BRIEF HOSPITAL COURSE:   HPI:  ***Patient with dementia and is not responsive.  Collateral information obtained from chart and notes.***    75F with chronic resp failure - vent dependent, hx of subarachnoid hemorrhage, hx of cardiac arrest, dementia s/p PEG, HTN, DM2 on insulin, hx of CVA, GERD, COPD, admission here from 9/25 to 10/4 and 11/7-11/11 for respiratory failure/sepsis possibly 2/2 aspiration vs vent associated PNA who presents from [facility] for abnormal labs.  Patient does not contribute to history. In the ED, patient's initial triage vitals were BP 88/37, HR 81, RR 18, 100% on vent and T 99 F.  Labs showed leukocytosis of 12.18, HGB 5.2, BUN/Cr 89/2.00. CXR: Tracheostomy cannula reidentified in position. No change heart mediastinum. Widespread bilateral airspace disease slightly improved.  correlate for pulmonary edema and/or infection. No significant pleural effusion. No pneumothorax. Patient's hand projects over portion of the right base. CT chest and A/P pending  (08 Dec 2021 15:50)    Events last 24 hours:   - No acute events, patient hemodynamically stable. Requiring mechanical ventilation. No abnormal body movements noticed     Review of Systems:  Unable to assess given clinical condition     Review of Systems:  CONSTITUTIONAL: No fever, chills, or fatigue  EYES: No eye pain, visual disturbances, or discharge  ENMT:  No difficulty hearing, tinnitus, vertigo; No sinus or throat pain  NECK: No pain or stiffness  RESPIRATORY: No cough, wheezing, chills or hemoptysis; No shortness of breath  CARDIOVASCULAR: No chest pain, palpitations, dizziness, or leg swelling  GASTROINTESTINAL: No abdominal or epigastric pain. No nausea, vomiting, or hematemesis; No diarrhea or constipation. No melena or hematochezia.  GENITOURINARY: No dysuria, frequency, hematuria, or incontinence  NEUROLOGICAL: No headaches, memory loss, loss of strength, numbness, or tremors  SKIN: No itching, burning, rashes, or lesions   MUSCULOSKELETAL: No joint pain or swelling; No muscle, back, or extremity pain  PSYCHIATRIC: No depression, anxiety, mood swings, or difficulty sleeping    PAST MEDICAL & SURGICAL HISTORY:  Dementia of frontal lobe type  Aphasic stroke  Diabetes mellitus  Respiratory failure  Hypertension  GERD (gastroesophageal reflux disease)  Constipation  Respiratory failure  CVA (cerebral vascular accident)  HTN (hypertension)  DM (diabetes mellitus)  Advanced dementia  COVID-19 virus detected  Quadriplegia  Pneumonia  Type II diabetes mellitus  Hx of appendectomy  Gastrostomy in place  Tracheostomy in place  Tracheostomy tube present  Feeding by G-tube    Medications:  piperacillin/tazobactam IVPB.. 3.375 Gram(s) IV Intermittent every 8 hours  ALBUTerol    90 MICROgram(s) HFA Inhaler 2 Puff(s) Inhalation every 6 hours  acetaminophen     Tablet .. 650 milliGRAM(s) Oral every 6 hours PRN  LORazepam     Tablet 1 milliGRAM(s) Oral every 4 hours  methocarbamol 500 milliGRAM(s) Oral two times a day  midazolam Injectable 2 milliGRAM(s) IV Push every 2 hours PRN  heparin   Injectable 5000 Unit(s) SubCutaneous every 12 hours  pantoprazole  Injectable 40 milliGRAM(s) IV Push every 12 hours  polyethylene glycol 3350 17 Gram(s) Oral every 12 hours  senna 2 Tablet(s) Oral at bedtime  simethicone 80 milliGRAM(s) Chew every 6 hours  sucralfate suspension 1 Gram(s) Enteral Tube every 6 hours  dextrose 40% Gel 15 Gram(s) Oral once  dextrose 50% Injectable 25 Gram(s) IV Push once  dextrose 50% Injectable 12.5 Gram(s) IV Push once  dextrose 50% Injectable 25 Gram(s) IV Push once  glucagon  Injectable 1 milliGRAM(s) IntraMuscular once  insulin lispro (ADMELOG) corrective regimen sliding scale   SubCutaneous every 6 hours  dextrose 5%. 1000 milliLiter(s) IV Continuous <Continuous>  dextrose 5%. 1000 milliLiter(s) IV Continuous <Continuous>  chlorhexidine 0.12% Liquid 15 milliLiter(s) Oral Mucosa every 12 hours    Mode: AC/ CMV (Assist Control/ Continuous Mandatory Ventilation)  RR (machine): 18  TV (machine): 400  FiO2: 40  PEEP: 5  ITime: 1  MAP: 16  PIP: 40    ICU Vital Signs Last 24 Hrs  T(C): 37.2 (18 Dec 2021 15:47), Max: 37.2 (17 Dec 2021 21:16)  T(F): 99 (18 Dec 2021 15:47), Max: 99 (17 Dec 2021 21:16)  HR: 96 (18 Dec 2021 19:08) (67 - 97)  BP: 123/65 (18 Dec 2021 18:00) (118/54 - 155/71)  BP(mean): 82 (18 Dec 2021 18:00) (74 - 96)  RR: 18 (18 Dec 2021 18:00) (18 - 27)  SpO2: 96% (18 Dec 2021 19:08) (95% - 100%)    ABG - ( 17 Dec 2021 05:46 )  pH, Arterial: 7.46  pH, Blood: x     /  pCO2: 34    /  pO2: 104   / HCO3: 24    / Base Excess: 0.4   /  SaO2: 98.5      I&O's Detail    17 Dec 2021 07:01  -  18 Dec 2021 07:00  --------------------------------------------------------  IN:    Enteral Tube Flush: 600 mL    Glucerna 1.5: 1200 mL    IV PiggyBack: 100 mL  Total IN: 1900 mL    OUT:    Voided (mL): 2150 mL  Total OUT: 2150 mL    Total NET: -250 mL    LABS:                        10.5   7.15  )-----------( 344      ( 18 Dec 2021 06:24 )             34.6     12-18    138  |  104  |  18  ----------------------------<  152<H>  5.0   |  26  |  0.90    Ca    8.8      18 Dec 2021 06:24  Phos  2.6     12-17  Mg     1.9     12-17    TPro  6.3  /  Alb  2.2<L>  /  TBili  0.4  /  DBili  x   /  AST  14  /  ALT  15  /  AlkPhos  130<H>  12-18    POCT Blood Glucose.: 182 mg/dL (18 Dec 2021 18:15)    CULTURES:  Culture Results:   No growth to date. (12-14 @ 22:29)    Physical Examination:  General: No acute distress.    HEENT: Pupils equal, reactive to light.  Symmetric.  PULM: Clear to auscultation bilaterally, no significant sputum production  NECK: Supple, no lymphadenopathy, trachea midline  CVS: Regular rate and rhythm, no murmurs, rubs, or gallops  ABD: Soft, nondistended, nontender, normoactive bowel sounds, no masses  EXT: No edema, nontender  SKIN: Warm and well perfused, no rashes noted.  NEURO: On mechanical ventilation and sedated   RADIOLOGY:   < from: Xray Chest 1 View- PORTABLE-Routine (Xray Chest 1 View- PORTABLE-Routine in AM.) (12.15.21 @ 06:56) >    ACC: [id number] EXAM:  XR CHEST PORTABLE ROUTINE 1V                          PROCEDURE DATE:  12/15/2021      INTERPRETATION:  Chest one view    HISTORY: Pneumonia    COMPARISON STUDY: 12/14/2021    Frontal expiratory view of the chest shows the heart to be normal in   size. Tracheostomy tube is again noted. Distended air-filled structure in   the lower neck likely indicates distended esophagus.    The lungs show slight clearing of the lungs with reduction of left   effusion and there is no evidence of pneumothorax nor right pleural   effusion. Incidentally noted is surgical hardware in the patient's   overlying left forearm.    IMPRESSION:  Partial clearing of the lungs. Distended upper esophagus. Clinical   correlation is suggested.    Thank you for the courtesy of this referral.    --- End of Report ---    ELLE PATTEN MD; Attending Interventional Radiologist  This document has been electronically signed. Dec 16 2021 11:26AM    < end of copied text >       Patient is a 76y old  Female who presents with a chief complaint of Anemia (18 Dec 2021 15:35)    BRIEF HOSPITAL COURSE:   HPI:  ***Patient with dementia and is not responsive.  Collateral information obtained from chart and notes.***    75F with chronic resp failure - vent dependent, hx of subarachnoid hemorrhage, hx of cardiac arrest, dementia s/p PEG, HTN, DM2 on insulin, hx of CVA, GERD, COPD, admission here from 9/25 to 10/4 and 11/7-11/11 for respiratory failure/sepsis possibly 2/2 aspiration vs vent associated PNA who presents from [facility] for abnormal labs.  Patient does not contribute to history. In the ED, patient's initial triage vitals were BP 88/37, HR 81, RR 18, 100% on vent and T 99 F.  Labs showed leukocytosis of 12.18, HGB 5.2, BUN/Cr 89/2.00. CXR: Tracheostomy cannula reidentified in position. No change heart mediastinum. Widespread bilateral airspace disease slightly improved.  correlate for pulmonary edema and/or infection. No significant pleural effusion. No pneumothorax. Patient's hand projects over portion of the right base. CT chest and A/P pending  (08 Dec 2021 15:50)    Events last 24 hours:   - No acute events, patient hemodynamically stable. Requiring mechanical ventilation. No abnormal body movements noticed     Review of Systems:  Unable to assess given clinical condition     PAST MEDICAL & SURGICAL HISTORY:  Dementia of frontal lobe type  Aphasic stroke  Diabetes mellitus  Respiratory failure  Hypertension  GERD (gastroesophageal reflux disease)  Constipation  Respiratory failure  CVA (cerebral vascular accident)  HTN (hypertension)  DM (diabetes mellitus)  Advanced dementia  COVID-19 virus detected  Quadriplegia  Pneumonia  Type II diabetes mellitus  Hx of appendectomy  Gastrostomy in place  Tracheostomy in place  Tracheostomy tube present  Feeding by G-tube    Medications:  piperacillin/tazobactam IVPB.. 3.375 Gram(s) IV Intermittent every 8 hours  ALBUTerol    90 MICROgram(s) HFA Inhaler 2 Puff(s) Inhalation every 6 hours  acetaminophen     Tablet .. 650 milliGRAM(s) Oral every 6 hours PRN  LORazepam     Tablet 1 milliGRAM(s) Oral every 4 hours  methocarbamol 500 milliGRAM(s) Oral two times a day  midazolam Injectable 2 milliGRAM(s) IV Push every 2 hours PRN  heparin   Injectable 5000 Unit(s) SubCutaneous every 12 hours  pantoprazole  Injectable 40 milliGRAM(s) IV Push every 12 hours  polyethylene glycol 3350 17 Gram(s) Oral every 12 hours  senna 2 Tablet(s) Oral at bedtime  simethicone 80 milliGRAM(s) Chew every 6 hours  sucralfate suspension 1 Gram(s) Enteral Tube every 6 hours  dextrose 40% Gel 15 Gram(s) Oral once  dextrose 50% Injectable 25 Gram(s) IV Push once  dextrose 50% Injectable 12.5 Gram(s) IV Push once  dextrose 50% Injectable 25 Gram(s) IV Push once  glucagon  Injectable 1 milliGRAM(s) IntraMuscular once  insulin lispro (ADMELOG) corrective regimen sliding scale   SubCutaneous every 6 hours  dextrose 5%. 1000 milliLiter(s) IV Continuous <Continuous>  dextrose 5%. 1000 milliLiter(s) IV Continuous <Continuous>  chlorhexidine 0.12% Liquid 15 milliLiter(s) Oral Mucosa every 12 hours    Mode: AC/ CMV (Assist Control/ Continuous Mandatory Ventilation)  RR (machine): 18  TV (machine): 400  FiO2: 40  PEEP: 5  ITime: 1  MAP: 16  PIP: 40    ICU Vital Signs Last 24 Hrs  T(C): 37.2 (18 Dec 2021 15:47), Max: 37.2 (17 Dec 2021 21:16)  T(F): 99 (18 Dec 2021 15:47), Max: 99 (17 Dec 2021 21:16)  HR: 96 (18 Dec 2021 19:08) (67 - 97)  BP: 123/65 (18 Dec 2021 18:00) (118/54 - 155/71)  BP(mean): 82 (18 Dec 2021 18:00) (74 - 96)  RR: 18 (18 Dec 2021 18:00) (18 - 27)  SpO2: 96% (18 Dec 2021 19:08) (95% - 100%)    ABG - ( 17 Dec 2021 05:46 )  pH, Arterial: 7.46  pH, Blood: x     /  pCO2: 34    /  pO2: 104   / HCO3: 24    / Base Excess: 0.4   /  SaO2: 98.5      I&O's Detail    17 Dec 2021 07:01  -  18 Dec 2021 07:00  --------------------------------------------------------  IN:    Enteral Tube Flush: 600 mL    Glucerna 1.5: 1200 mL    IV PiggyBack: 100 mL  Total IN: 1900 mL    OUT:    Voided (mL): 2150 mL  Total OUT: 2150 mL    Total NET: -250 mL    LABS:                        10.5   7.15  )-----------( 344      ( 18 Dec 2021 06:24 )             34.6     12-18    138  |  104  |  18  ----------------------------<  152<H>  5.0   |  26  |  0.90    Ca    8.8      18 Dec 2021 06:24  Phos  2.6     12-17  Mg     1.9     12-17    TPro  6.3  /  Alb  2.2<L>  /  TBili  0.4  /  DBili  x   /  AST  14  /  ALT  15  /  AlkPhos  130<H>  12-18    POCT Blood Glucose.: 182 mg/dL (18 Dec 2021 18:15)    CULTURES:  Culture Results:   No growth to date. (12-14 @ 22:29)    Physical Examination:  General: No acute distress.    HEENT: Pupils equal, reactive to light.  Symmetric.  PULM: Clear to auscultation bilaterally, no significant sputum production  NECK: Supple, no lymphadenopathy, trachea midline  CVS: Regular rate and rhythm, no murmurs, rubs, or gallops  ABD: Soft, nondistended, nontender, normoactive bowel sounds, no masses  EXT: No edema, nontender  SKIN: Warm and well perfused, no rashes noted.  NEURO: On mechanical ventilation and sedated   RADIOLOGY:   < from: Xray Chest 1 View- PORTABLE-Routine (Xray Chest 1 View- PORTABLE-Routine in AM.) (12.15.21 @ 06:56) >    ACC: [id number] EXAM:  XR CHEST PORTABLE ROUTINE 1V                          PROCEDURE DATE:  12/15/2021      INTERPRETATION:  Chest one view    HISTORY: Pneumonia    COMPARISON STUDY: 12/14/2021    Frontal expiratory view of the chest shows the heart to be normal in   size. Tracheostomy tube is again noted. Distended air-filled structure in   the lower neck likely indicates distended esophagus.    The lungs show slight clearing of the lungs with reduction of left   effusion and there is no evidence of pneumothorax nor right pleural   effusion. Incidentally noted is surgical hardware in the patient's   overlying left forearm.    IMPRESSION:  Partial clearing of the lungs. Distended upper esophagus. Clinical   correlation is suggested.    Thank you for the courtesy of this referral.    --- End of Report ---    ELLE PATTEN MD; Attending Interventional Radiologist  This document has been electronically signed. Dec 16 2021 11:26AM    < end of copied text >

## 2021-12-18 NOTE — PROGRESS NOTE ADULT - SUBJECTIVE AND OBJECTIVE BOX
[facility] DIVISION of INFECTIOUS DISEASE  Arturo Olivas MD PhD, Haylee Umanzor MD, Andressa Brantley MD, Billy Valenzuela MD, Emil Medellin MD  and providing coverage with Alexa Canas MD and Eusebio Chairez MD  Providing Infectious Disease Consultations at [facility], [facility]    Office# [phone] to schedule follow up appointments  Answering Service for urgent calls or New Consults [phone]  Cell# to text for urgent issues Arturo Olivas [phone]     infectious diseases progress note:    BREA BECKHAM is a 76y y. o. Female patient    No concerning overnight events    Allergies    codeine (Hives)    Intolerances        ANTIBIOTICS/RELEVANT:  antimicrobials  piperacillin/tazobactam IVPB.. 3.375 Gram(s) IV Intermittent every 8 hours    immunologic:    OTHER:  acetaminophen     Tablet .. 650 milliGRAM(s) Oral every 6 hours PRN  ALBUTerol    90 MICROgram(s) HFA Inhaler 2 Puff(s) Inhalation every 6 hours  chlorhexidine 0.12% Liquid 15 milliLiter(s) Oral Mucosa every 12 hours  dextrose 40% Gel 15 Gram(s) Oral once  dextrose 5%. 1000 milliLiter(s) IV Continuous <Continuous>  dextrose 5%. 1000 milliLiter(s) IV Continuous <Continuous>  dextrose 50% Injectable 25 Gram(s) IV Push once  dextrose 50% Injectable 12.5 Gram(s) IV Push once  dextrose 50% Injectable 25 Gram(s) IV Push once  glucagon  Injectable 1 milliGRAM(s) IntraMuscular once  heparin   Injectable 5000 Unit(s) SubCutaneous every 12 hours  insulin lispro (ADMELOG) corrective regimen sliding scale   SubCutaneous every 6 hours  LORazepam     Tablet 1 milliGRAM(s) Oral every 4 hours  methocarbamol 500 milliGRAM(s) Oral two times a day  midazolam Injectable 2 milliGRAM(s) IV Push every 2 hours PRN  pantoprazole  Injectable 40 milliGRAM(s) IV Push every 12 hours  polyethylene glycol 3350 17 Gram(s) Oral every 12 hours  senna 2 Tablet(s) Oral at bedtime  simethicone 80 milliGRAM(s) Chew every 6 hours  sucralfate suspension 1 Gram(s) Enteral Tube every 6 hours      Objective:  Vital Signs Last 24 Hrs  T(C): 36.7 (18 Dec 2021 08:43), Max: 37.2 (17 Dec 2021 12:49)  T(F): 98 (18 Dec 2021 08:43), Max: 99 (17 Dec 2021 21:16)  HR: 74 (18 Dec 2021 08:00) (66 - 96)  BP: 141/75 (18 Dec 2021 08:00) (123/68 - 171/87)  BP(mean): 96 (18 Dec 2021 08:00) (84 - 119)  RR: 18 (18 Dec 2021 08:00) (18 - 46)  SpO2: 99% (18 Dec 2021 08:00) (93% - 100%)    T(C): 36.7 (12-18-21 @ 08:43), Max: 37.2 (12-17-21 @ 12:49)  T(C): 36.7 (12-18-21 @ 08:43), Max: 37.2 (12-17-21 @ 12:49)  T(C): 36.7 (12-18-21 @ 08:43), Max: 37.2 (12-17-21 @ 12:49)    PHYSICAL EXAM:  HEENT: NC atraumatic  Neck: supple, remains intubated via trach  Respiratory: no accessory muscle use, breathing comfortably  Cardiovascular: distant  Gastrointestinal: normal appearing, nondistended  Extremities: no clubbing, no cyanosis,    Mode: AC/ CMV (Assist Control/ Continuous Mandatory Ventilation), RR (machine): 18, TV (machine): 400, FiO2: 40, PEEP: 5, ITime: 1, MAP: 11, PIP: 40    LABS:                          10.5   7.15  )-----------( 344      ( 18 Dec 2021 06:24 )             34.6       7.15 12-18 @ 06:24  7.94 12-17 @ 07:01  17.35 12-14 @ 13:58  5.60 12-13 @ 06:20  5.82 12-12 @ 06:29  6.98 12-11 @ 09:39      12-18    138  |  104  |  18  ----------------------------<  152<H>  5.0   |  26  |  0.90    Ca    8.8      18 Dec 2021 06:24  Phos  2.6     12-17  Mg     1.9     12-17    TPro  6.3  /  Alb  2.2<L>  /  TBili  0.4  /  DBili  x   /  AST  14  /  ALT  15  /  AlkPhos  130<H>  12-18      Creatinine, Serum: 0.90 mg/dL (12-18-21 @ 06:24)  Creatinine, Serum: 0.88 mg/dL (12-17-21 @ 07:01)  Creatinine, Serum: 1.10 mg/dL (12-15-21 @ 07:27)  Creatinine, Serum: 1.20 mg/dL (12-14-21 @ 15:28)  Creatinine, Serum: 1.03 mg/dL (12-13-21 @ 06:20)  Creatinine, Serum: 1.13 mg/dL (12-12-21 @ 06:29)  Creatinine, Serum: 1.22 mg/dL (12-11-21 @ 09:40)                INFLAMMATORY MARKERS  Auto Lymphocyte #: 0.37 K/uL (12-14-21 @ 13:58)  Auto Neutrophil #: 16.02 K/uL (12-14-21 @ 13:58)  Auto Lymphocyte #: 0.23 K/uL (12-10-21 @ 08:38)  Auto Neutrophil #: 12.03 K/uL (12-10-21 @ 08:38)  Auto Neutrophil #: 10.16 K/uL (12-08-21 @ 11:58)  Auto Lymphocyte #: 0.82 K/uL (12-08-21 @ 11:58)    Lactate, Blood: 1.4 mmol/L (12-09-21 @ 07:09)  Lactate, Blood: 1.9 mmol/L (12-08-21 @ 11:58)    Auto Eosinophil #: 0.01 K/uL (12-14-21 @ 13:58)  Auto Eosinophil #: 0.02 K/uL (12-10-21 @ 08:38)  Auto Eosinophil #: 0.22 K/uL (12-08-21 @ 11:58)        Procalcitonin, Serum: 6.72 ng/mL (12-09-21 @ 14:07)            Ferritin, Serum: 267 ng/mL (12-13-21 @ 14:05)        INR: 1.10 ratio (12-09-21 @ 07:09)  Activated Partial Thromboplastin Time: 33.8 sec (12-08-21 @ 11:58)  INR: 1.16 ratio (12-08-21 @ 11:58)          MICROBIOLOGY:              RADIOLOGY & ADDITIONAL STUDIES:

## 2021-12-18 NOTE — PROGRESS NOTE ADULT - ASSESSMENT
Assessment:  76 y/o F w/ a PMHx of chronic respiratory failure s/p trach/PEG, subarachnoid hemorrhage, HTN, DM type 2, GERD, and dementia admitted w/:    Problem List:  1. Anemia  2. RYNA on CKD --> resolved   3. Seizure  4. Aspiration pneumonia  5. Acute on chronic hypoxic respiratory failure   6. Acute metabolic encephalopathy     Plan:  Neuro: Monitor mental status and monitor for any seizure like activity. C/w ativan 1mg q4h. Versed prn seizure like activity. Neuro on board. Likely not seizures according to neurology. Will continue to monitor   CV: Hemodynamically stable. Goal MAP>65mmHg. Not requiring vasopressors currently   Pulm: Acute hypoxic resp failure 2/2 aspiration pna. Patient currently on Full vent support. Titrate settings to maintain SaO2 >90%, or pH >7.25. Consider low tidal volume ventilation strategy w/ goal Tv 4-6 cc/kg of ideal body weight. Plateu pressure goal <30. Peridex oral care. Aggressive pulmonary toilet   Renal: RYAN resolved. Strict I/Os, goal UOP >0.5cc/kg/hr. Trend renal function and electrolytes, replete as needed. Avoid nephrotoxic agents  GI: Tube feeds, bowel regiment, PPI  ID: Carbapenem resistant pseudomonas, serratia, and steno. C/w Zosyn. F/u cultures. Trend wbc/fevers/procalcitonin. ID on board  Heme: LE duplex negative. On heparin subq for DVT prophylaxis. Anemia somewhat improved. Trend CBC   Endo: Goal blood glucose <180. C/w ISS

## 2021-12-19 LAB
ANION GAP SERPL CALC-SCNC: 9 MMOL/L — SIGNIFICANT CHANGE UP (ref 5–17)
BUN SERPL-MCNC: 21 MG/DL — SIGNIFICANT CHANGE UP (ref 7–23)
CALCIUM SERPL-MCNC: 9.2 MG/DL — SIGNIFICANT CHANGE UP (ref 8.4–10.5)
CHLORIDE SERPL-SCNC: 103 MMOL/L — SIGNIFICANT CHANGE UP (ref 96–108)
CO2 SERPL-SCNC: 27 MMOL/L — SIGNIFICANT CHANGE UP (ref 22–31)
CREAT SERPL-MCNC: 0.98 MG/DL — SIGNIFICANT CHANGE UP (ref 0.5–1.3)
CULTURE RESULTS: SIGNIFICANT CHANGE UP
GLUCOSE BLDC GLUCOMTR-MCNC: 157 MG/DL — HIGH (ref 70–99)
GLUCOSE BLDC GLUCOMTR-MCNC: 159 MG/DL — HIGH (ref 70–99)
GLUCOSE BLDC GLUCOMTR-MCNC: 163 MG/DL — HIGH (ref 70–99)
GLUCOSE BLDC GLUCOMTR-MCNC: 176 MG/DL — HIGH (ref 70–99)
GLUCOSE SERPL-MCNC: 156 MG/DL — HIGH (ref 70–99)
HCT VFR BLD CALC: 36.1 % — SIGNIFICANT CHANGE UP (ref 34.5–45)
HGB BLD-MCNC: 10.8 G/DL — LOW (ref 11.5–15.5)
MAGNESIUM SERPL-MCNC: 2.2 MG/DL — SIGNIFICANT CHANGE UP (ref 1.6–2.6)
MCHC RBC-ENTMCNC: 25.4 PG — LOW (ref 27–34)
MCHC RBC-ENTMCNC: 29.9 GM/DL — LOW (ref 32–36)
MCV RBC AUTO: 84.9 FL — SIGNIFICANT CHANGE UP (ref 80–100)
NRBC # BLD: 0 /100 WBCS — SIGNIFICANT CHANGE UP (ref 0–0)
PHOSPHATE SERPL-MCNC: 3.4 MG/DL — SIGNIFICANT CHANGE UP (ref 2.5–4.5)
PLATELET # BLD AUTO: 387 K/UL — SIGNIFICANT CHANGE UP (ref 150–400)
POTASSIUM SERPL-MCNC: 5.2 MMOL/L — SIGNIFICANT CHANGE UP (ref 3.5–5.3)
POTASSIUM SERPL-SCNC: 5.2 MMOL/L — SIGNIFICANT CHANGE UP (ref 3.5–5.3)
RBC # BLD: 4.25 M/UL — SIGNIFICANT CHANGE UP (ref 3.8–5.2)
RBC # FLD: 17.5 % — HIGH (ref 10.3–14.5)
SODIUM SERPL-SCNC: 139 MMOL/L — SIGNIFICANT CHANGE UP (ref 135–145)
SPECIMEN SOURCE: SIGNIFICANT CHANGE UP
WBC # BLD: 8.64 K/UL — SIGNIFICANT CHANGE UP (ref 3.8–10.5)
WBC # FLD AUTO: 8.64 K/UL — SIGNIFICANT CHANGE UP (ref 3.8–10.5)

## 2021-12-19 PROCEDURE — 99232 SBSQ HOSP IP/OBS MODERATE 35: CPT

## 2021-12-19 RX ORDER — PANTOPRAZOLE SODIUM 20 MG/1
40 TABLET, DELAYED RELEASE ORAL
Refills: 0 | Status: DISCONTINUED | OUTPATIENT
Start: 2021-12-19 | End: 2021-12-20

## 2021-12-19 RX ADMIN — Medication 1 GRAM(S): at 23:58

## 2021-12-19 RX ADMIN — Medication 1 MILLIGRAM(S): at 02:27

## 2021-12-19 RX ADMIN — Medication 2: at 06:04

## 2021-12-19 RX ADMIN — Medication 1 GRAM(S): at 17:28

## 2021-12-19 RX ADMIN — Medication 2: at 23:58

## 2021-12-19 RX ADMIN — SIMETHICONE 80 MILLIGRAM(S): 80 TABLET, CHEWABLE ORAL at 17:28

## 2021-12-19 RX ADMIN — Medication 1 MILLIGRAM(S): at 14:31

## 2021-12-19 RX ADMIN — ALBUTEROL 2 PUFF(S): 90 AEROSOL, METERED ORAL at 13:17

## 2021-12-19 RX ADMIN — Medication 1 GRAM(S): at 06:03

## 2021-12-19 RX ADMIN — POLYETHYLENE GLYCOL 3350 17 GRAM(S): 17 POWDER, FOR SOLUTION ORAL at 06:04

## 2021-12-19 RX ADMIN — PIPERACILLIN AND TAZOBACTAM 25 GRAM(S): 4; .5 INJECTION, POWDER, LYOPHILIZED, FOR SOLUTION INTRAVENOUS at 06:02

## 2021-12-19 RX ADMIN — Medication 2: at 17:54

## 2021-12-19 RX ADMIN — Medication 1 MILLIGRAM(S): at 06:03

## 2021-12-19 RX ADMIN — ALBUTEROL 2 PUFF(S): 90 AEROSOL, METERED ORAL at 00:51

## 2021-12-19 RX ADMIN — SIMETHICONE 80 MILLIGRAM(S): 80 TABLET, CHEWABLE ORAL at 11:21

## 2021-12-19 RX ADMIN — SENNA PLUS 2 TABLET(S): 8.6 TABLET ORAL at 22:31

## 2021-12-19 RX ADMIN — HEPARIN SODIUM 5000 UNIT(S): 5000 INJECTION INTRAVENOUS; SUBCUTANEOUS at 06:02

## 2021-12-19 RX ADMIN — Medication 1 MILLIGRAM(S): at 11:20

## 2021-12-19 RX ADMIN — PANTOPRAZOLE SODIUM 40 MILLIGRAM(S): 20 TABLET, DELAYED RELEASE ORAL at 06:03

## 2021-12-19 RX ADMIN — HEPARIN SODIUM 5000 UNIT(S): 5000 INJECTION INTRAVENOUS; SUBCUTANEOUS at 17:28

## 2021-12-19 RX ADMIN — PIPERACILLIN AND TAZOBACTAM 25 GRAM(S): 4; .5 INJECTION, POWDER, LYOPHILIZED, FOR SOLUTION INTRAVENOUS at 14:31

## 2021-12-19 RX ADMIN — PIPERACILLIN AND TAZOBACTAM 25 GRAM(S): 4; .5 INJECTION, POWDER, LYOPHILIZED, FOR SOLUTION INTRAVENOUS at 22:31

## 2021-12-19 RX ADMIN — ALBUTEROL 2 PUFF(S): 90 AEROSOL, METERED ORAL at 07:48

## 2021-12-19 RX ADMIN — CHLORHEXIDINE GLUCONATE 15 MILLILITER(S): 213 SOLUTION TOPICAL at 17:28

## 2021-12-19 RX ADMIN — METHOCARBAMOL 500 MILLIGRAM(S): 500 TABLET, FILM COATED ORAL at 06:02

## 2021-12-19 RX ADMIN — Medication 1 GRAM(S): at 11:21

## 2021-12-19 RX ADMIN — PANTOPRAZOLE SODIUM 40 MILLIGRAM(S): 20 TABLET, DELAYED RELEASE ORAL at 17:29

## 2021-12-19 RX ADMIN — SIMETHICONE 80 MILLIGRAM(S): 80 TABLET, CHEWABLE ORAL at 06:03

## 2021-12-19 RX ADMIN — POLYETHYLENE GLYCOL 3350 17 GRAM(S): 17 POWDER, FOR SOLUTION ORAL at 17:28

## 2021-12-19 RX ADMIN — METHOCARBAMOL 500 MILLIGRAM(S): 500 TABLET, FILM COATED ORAL at 17:28

## 2021-12-19 RX ADMIN — Medication 1 MILLIGRAM(S): at 22:31

## 2021-12-19 RX ADMIN — PIPERACILLIN AND TAZOBACTAM 25 GRAM(S): 4; .5 INJECTION, POWDER, LYOPHILIZED, FOR SOLUTION INTRAVENOUS at 00:28

## 2021-12-19 RX ADMIN — CHLORHEXIDINE GLUCONATE 15 MILLILITER(S): 213 SOLUTION TOPICAL at 06:03

## 2021-12-19 RX ADMIN — Medication 1 MILLIGRAM(S): at 17:28

## 2021-12-19 RX ADMIN — SIMETHICONE 80 MILLIGRAM(S): 80 TABLET, CHEWABLE ORAL at 23:58

## 2021-12-19 RX ADMIN — ALBUTEROL 2 PUFF(S): 90 AEROSOL, METERED ORAL at 20:26

## 2021-12-19 RX ADMIN — Medication 2: at 11:37

## 2021-12-19 NOTE — PROGRESS NOTE ADULT - ASSESSMENT
75F with chronic resp failure - vent dependent, hx of subarachnoid hemorrhage, hx of cardiac arrest, dementia s/p PEG, HTN, DM2 on insulin, hx of CVA, GERD, COPD, admission here from 9/25 to 10/4 and 11/7-11/11 for respiratory failure/sepsis possibly 2/2 aspiration vs vent associated PNA who presents from [facility] w leukocytosis of 12.18, and concerns for repeat aspiration PNA    CT-Distal airways impaction and multifocal pneumonia, possibly related to aspiration. Most of the opacities are either new or unchanged since prior study.     Recurrent VAP, Acute on Chronic Hypoxic Respiratory Failure  -infectious w/u reviewed  --12/11 RCx w/ CRE Pseudomonas, Stenotrophomonas and Serratia marcescens  s/p course of zosyn (12/8-12/14), s/p levofloxacin 750mg IV x 1 on 12/13  leukocytosis possible reactive 2/2 seizure vs aspiration pneumonia or pneumonitis during seizure  -imaging reviewed  CXR 12/14 w/ increased bibasilar infiltrates compared with 12/8; vettings back to FiO2 of 40%   CXR 12/16 w/ improved aeration of L lung, b/l lower lobe opacification; suspect progression of prior aspiration PNA which will take time to resolve on imaging vs pulmonary edema given rapid improvement on CXR  -trend temps/WBC  --leukocytosis resolved  -vent management per pulm/ICU  -zosyn restarted on 12/14; continue for now. plan for last dose Monday 12/20 then dc     Starting tomorrow Dr Emil Medellin will be covering this patient so please contact him with further questions office [phone] or our answering service at [phone]

## 2021-12-19 NOTE — PROGRESS NOTE ADULT - ASSESSMENT
75F with chronic resp failure - vent dependent, hx of subarachnoid hemorrhage, hx of cardiac arrest, dementia s/p PEG, HTN, DM2 on insulin, hx of CVA, GERD, COPD, admission here from 9/25 to 10/4 and 11/7-11/11 for respiratory failure/sepsis possibly 2/2 aspiration vs vent associated PNA who presents from [facility] for abnormal labs.  Patient does not contribute to history. In the ED, patient's initial triage vitals were BP 88/37, HR 81, RR 18, 100% on vent and T 99 F.  Labs showed leukocytosis of 12.18, HGB 5.2, BUN/Cr 89/2.00. CXR: Tracheostomy cannula reidentified in position. No change heart mediastinum. Widespread bilateral airspace disease slightly improved.  correlate for pulmonary edema and/or infection. No significant pleural effusion. No pneumothorax. Patient's hand projects over portion of the right base. CT chest and A/P pending  (08 Dec 2021 15:50)      ACUTE RENAL FAILURE: sodium chloride 0.9%. 1000 milliLiter(s) (65 mL/Hr  Serum creatinine is improving    There is no progression . No uremic symptoms  No evidence of anemia .  Fluid status stable.  Will continue to avoid nephrotoxic drugs.  Patient remains asymptomatic.   Continue current therapy.    f/u blood and urine cx,serial lactate levels,monitor vitals roddy raymundo hydration,monitor urine output and renal profile,    hypotension midodrine 5 milliGRAM(s) Oral every 8 hours

## 2021-12-19 NOTE — PROGRESS NOTE ADULT - SUBJECTIVE AND OBJECTIVE BOX
Patient is a 76y Female whom presented to the hospital with ckd and manasa     PAST MEDICAL & SURGICAL HISTORY:  Dementia of frontal lobe type    Aphasic stroke    Diabetes mellitus    Respiratory failure    Hypertension    GERD (gastroesophageal reflux disease)    Constipation    Respiratory failure    CVA (cerebral vascular accident)    HTN (hypertension)    DM (diabetes mellitus)    Advanced dementia    COVID-19 virus detected    Quadriplegia    Pneumonia    Type II diabetes mellitus    Hx of appendectomy    Gastrostomy in place    Tracheostomy in place    Tracheostomy tube present    Feeding by G-tube        MEDICATIONS  (STANDING):  ALBUTerol    90 MICROgram(s) HFA Inhaler 2 Puff(s) Inhalation every 6 hours  chlorhexidine 0.12% Liquid 15 milliLiter(s) Oral Mucosa every 12 hours  dextrose 40% Gel 15 Gram(s) Oral once  dextrose 5%. 1000 milliLiter(s) (50 mL/Hr) IV Continuous <Continuous>  dextrose 5%. 1000 milliLiter(s) (100 mL/Hr) IV Continuous <Continuous>  dextrose 50% Injectable 25 Gram(s) IV Push once  dextrose 50% Injectable 12.5 Gram(s) IV Push once  dextrose 50% Injectable 25 Gram(s) IV Push once  glucagon  Injectable 1 milliGRAM(s) IntraMuscular once  insulin lispro (ADMELOG) corrective regimen sliding scale   SubCutaneous every 6 hours  lactated ringers. 1000 milliLiter(s) (100 mL/Hr) IV Continuous <Continuous>  methocarbamol 250 milliGRAM(s) Oral two times a day  pantoprazole  Injectable 40 milliGRAM(s) IV Push every 12 hours  piperacillin/tazobactam IVPB.. 3.375 Gram(s) IV Intermittent every 8 hours  polyethylene glycol 3350 17 Gram(s) Oral every 12 hours  senna 2 Tablet(s) Oral at bedtime  simethicone 80 milliGRAM(s) Chew every 6 hours  sucralfate 1 Gram(s) Oral every 6 hours  tiotropium 18 MICROgram(s) Capsule 1 Capsule(s) Inhalation daily  vancomycin  IVPB 750 milliGRAM(s) IV Intermittent every 12 hours      Allergies    codeine (Hives)    Intolerances        SOCIAL HISTORY:  Denies ETOh,Smoking,     FAMILY HISTORY:  No pertinent family history in first degree relatives        REVIEW OF SYSTEMS:    unable to obtained a good review system                                                      10.8   8.64  )-----------( 387      ( 19 Dec 2021 06:53 )             36.1       CBC Full  -  ( 19 Dec 2021 06:53 )  WBC Count : 8.64 K/uL  RBC Count : 4.25 M/uL  Hemoglobin : 10.8 g/dL  Hematocrit : 36.1 %  Platelet Count - Automated : 387 K/uL  Mean Cell Volume : 84.9 fl  Mean Cell Hemoglobin : 25.4 pg  Mean Cell Hemoglobin Concentration : 29.9 gm/dL  Auto Neutrophil # : x  Auto Lymphocyte # : x  Auto Monocyte # : x  Auto Eosinophil # : x  Auto Basophil # : x  Auto Neutrophil % : x  Auto Lymphocyte % : x  Auto Monocyte % : x  Auto Eosinophil % : x  Auto Basophil % : x      12-19    139  |  103  |  21  ----------------------------<  156<H>  5.2   |  27  |  0.98    Ca    9.2      19 Dec 2021 06:53  Phos  3.4     12-19  Mg     2.2     12-19    TPro  6.3  /  Alb  2.2<L>  /  TBili  0.4  /  DBili  x   /  AST  14  /  ALT  15  /  AlkPhos  130<H>  12-18      CAPILLARY BLOOD GLUCOSE      POCT Blood Glucose.: 163 mg/dL (19 Dec 2021 11:32)  POCT Blood Glucose.: 157 mg/dL (19 Dec 2021 06:01)  POCT Blood Glucose.: 127 mg/dL (18 Dec 2021 23:21)  POCT Blood Glucose.: 182 mg/dL (18 Dec 2021 18:15)      Vital Signs Last 24 Hrs  T(C): 36.7 (19 Dec 2021 08:30), Max: 37.2 (18 Dec 2021 15:47)  T(F): 98.1 (19 Dec 2021 08:30), Max: 99 (18 Dec 2021 15:47)  HR: 70 (19 Dec 2021 13:18) (62 - 97)  BP: 132/56 (19 Dec 2021 12:00) (112/56 - 155/71)  BP(mean): 78 (19 Dec 2021 12:00) (73 - 94)  RR: 18 (19 Dec 2021 12:00) (14 - 22)  SpO2: 100% (19 Dec 2021 13:18) (96% - 100%)                                                                         PHYSICAL EXAM:    Constitutional: NAD  Neck:  No JVD  Respiratory: pos decrease bs pos trach  Cardiovascular: S1 and S2  Gastrointestinal: BS+, soft, pos peg   Extremities: No peripheral edema

## 2021-12-19 NOTE — PROGRESS NOTE ADULT - ASSESSMENT
The patient is a 76 year old female with a history of HTN, DM, CVA, dementia, chronic respiratory failure s/p trach, PEG, cardiac arrest who presents from NH with anemia.    12/19/21  Seen at [facility] ICU  Intubated, sleeping comfortably  Monitor-NSR    Plan:  - Recent echo with normal LV systolic function, mild pulm HTN  - Hemoglobin improved with transfusion  - Remain off of aspirin  - Seizures - EEG and neuro work-up as indicated  - CXR with bilateral infiltrates - aspiration? pulm edema?  - If O2 requirements worsen can do trial of diuretics  - Mechanical ventilation  - Overall poor prognosis. Comfort care would be most appropriate.

## 2021-12-19 NOTE — PROGRESS NOTE ADULT - SUBJECTIVE AND OBJECTIVE BOX
[facility] DIVISION of INFECTIOUS DISEASE  Arturo Olivas MD PhD, Haylee Umanzor MD, Andressa Brantley MD, Billy Valenzuela MD, Emil Medellin MD  and providing coverage with Alexa Canas MD and Eusebio Chairez MD  Providing Infectious Disease Consultations at [facility], [facility]    Office# [phone] to schedule follow up appointments  Answering Service for urgent calls or New Consults [phone]  Cell# to text for urgent issues Arturo Olivas [phone]     infectious diseases progress note:    BREA BECKHAM is a 76y y. o. Female patient    No concerning overnight events    Allergies    codeine (Hives)    Intolerances        ANTIBIOTICS/RELEVANT:  antimicrobials  piperacillin/tazobactam IVPB.. 3.375 Gram(s) IV Intermittent every 8 hours    immunologic:    OTHER:  acetaminophen     Tablet .. 650 milliGRAM(s) Oral every 6 hours PRN  ALBUTerol    90 MICROgram(s) HFA Inhaler 2 Puff(s) Inhalation every 6 hours  chlorhexidine 0.12% Liquid 15 milliLiter(s) Oral Mucosa every 12 hours  dextrose 40% Gel 15 Gram(s) Oral once  dextrose 5%. 1000 milliLiter(s) IV Continuous <Continuous>  dextrose 5%. 1000 milliLiter(s) IV Continuous <Continuous>  dextrose 50% Injectable 25 Gram(s) IV Push once  dextrose 50% Injectable 12.5 Gram(s) IV Push once  dextrose 50% Injectable 25 Gram(s) IV Push once  glucagon  Injectable 1 milliGRAM(s) IntraMuscular once  heparin   Injectable 5000 Unit(s) SubCutaneous every 12 hours  insulin lispro (ADMELOG) corrective regimen sliding scale   SubCutaneous every 6 hours  LORazepam     Tablet 1 milliGRAM(s) Oral every 4 hours  methocarbamol 500 milliGRAM(s) Oral two times a day  midazolam Injectable 2 milliGRAM(s) IV Push every 2 hours PRN  pantoprazole  Injectable 40 milliGRAM(s) IV Push every 12 hours  polyethylene glycol 3350 17 Gram(s) Oral every 12 hours  senna 2 Tablet(s) Oral at bedtime  simethicone 80 milliGRAM(s) Chew every 6 hours  sucralfate suspension 1 Gram(s) Enteral Tube every 6 hours      Objective:  Vital Signs Last 24 Hrs  T(C): 37.1 (19 Dec 2021 03:52), Max: 37.2 (18 Dec 2021 15:47)  T(F): 98.7 (19 Dec 2021 03:52), Max: 99 (18 Dec 2021 15:47)  HR: 65 (19 Dec 2021 07:54) (62 - 97)  BP: 121/73 (19 Dec 2021 06:00) (112/56 - 155/71)  BP(mean): 88 (19 Dec 2021 06:00) (73 - 94)  RR: 16 (19 Dec 2021 06:00) (16 - 24)  SpO2: 100% (19 Dec 2021 07:54) (95% - 100%)    T(C): 37.1 (12-19-21 @ 03:52), Max: 37.2 (12-17-21 @ 12:49)  T(C): 37.1 (12-19-21 @ 03:52), Max: 37.2 (12-17-21 @ 12:49)  T(C): 37.1 (12-19-21 @ 03:52), Max: 37.2 (12-17-21 @ 12:49)    PHYSICAL EXAM:  HEENT: NC atraumatic  Neck: supple, intubated via trach with green sputum in trap  Respiratory: no accessory muscle use, breathing comfortably  Cardiovascular: distant  Gastrointestinal: normal appearing, nondistended  Extremities: no clubbing, no cyanosis,    Mode: AC/ CMV (Assist Control/ Continuous Mandatory Ventilation), RR (machine): 18, TV (machine): 400, FiO2: 40, PEEP: 5, ITime: 1, MAP: 12, PIP: 34    LABS:                          10.8   8.64  )-----------( 387      ( 19 Dec 2021 06:53 )             36.1       8.64 12-19 @ 06:53  7.15 12-18 @ 06:24  7.94 12-17 @ 07:01  17.35 12-14 @ 13:58  5.60 12-13 @ 06:20      12-19    139  |  103  |  21  ----------------------------<  156<H>  5.2   |  27  |  0.98    Ca    9.2      19 Dec 2021 06:53  Phos  3.4     12-19  Mg     2.2     12-19    TPro  6.3  /  Alb  2.2<L>  /  TBili  0.4  /  DBili  x   /  AST  14  /  ALT  15  /  AlkPhos  130<H>  12-18      Creatinine, Serum: 0.98 mg/dL (12-19-21 @ 06:53)  Creatinine, Serum: 0.90 mg/dL (12-18-21 @ 06:24)  Creatinine, Serum: 0.88 mg/dL (12-17-21 @ 07:01)  Creatinine, Serum: 1.10 mg/dL (12-15-21 @ 07:27)  Creatinine, Serum: 1.20 mg/dL (12-14-21 @ 15:28)  Creatinine, Serum: 1.03 mg/dL (12-13-21 @ 06:20)                INFLAMMATORY MARKERS  Auto Lymphocyte #: 0.37 K/uL (12-14-21 @ 13:58)  Auto Neutrophil #: 16.02 K/uL (12-14-21 @ 13:58)  Auto Lymphocyte #: 0.23 K/uL (12-10-21 @ 08:38)  Auto Neutrophil #: 12.03 K/uL (12-10-21 @ 08:38)  Auto Neutrophil #: 10.16 K/uL (12-08-21 @ 11:58)  Auto Lymphocyte #: 0.82 K/uL (12-08-21 @ 11:58)    Lactate, Blood: 1.4 mmol/L (12-09-21 @ 07:09)  Lactate, Blood: 1.9 mmol/L (12-08-21 @ 11:58)    Auto Eosinophil #: 0.01 K/uL (12-14-21 @ 13:58)  Auto Eosinophil #: 0.02 K/uL (12-10-21 @ 08:38)  Auto Eosinophil #: 0.22 K/uL (12-08-21 @ 11:58)        Procalcitonin, Serum: 6.72 ng/mL (12-09-21 @ 14:07)            Ferritin, Serum: 267 ng/mL (12-13-21 @ 14:05)        INR: 1.10 ratio (12-09-21 @ 07:09)  Activated Partial Thromboplastin Time: 33.8 sec (12-08-21 @ 11:58)  INR: 1.16 ratio (12-08-21 @ 11:58)          MICROBIOLOGY:              RADIOLOGY & ADDITIONAL STUDIES:

## 2021-12-19 NOTE — PROGRESS NOTE ADULT - SUBJECTIVE AND OBJECTIVE BOX
Subjective: Patient seen and examined.  No overnight events.     MEDICATIONS  (STANDING):  ALBUTerol    90 MICROgram(s) HFA Inhaler 2 Puff(s) Inhalation every 6 hours  chlorhexidine 0.12% Liquid 15 milliLiter(s) Oral Mucosa every 12 hours  dextrose 40% Gel 15 Gram(s) Oral once  dextrose 5%. 1000 milliLiter(s) (50 mL/Hr) IV Continuous <Continuous>  dextrose 5%. 1000 milliLiter(s) (100 mL/Hr) IV Continuous <Continuous>  dextrose 50% Injectable 25 Gram(s) IV Push once  dextrose 50% Injectable 12.5 Gram(s) IV Push once  dextrose 50% Injectable 25 Gram(s) IV Push once  glucagon  Injectable 1 milliGRAM(s) IntraMuscular once  heparin   Injectable 5000 Unit(s) SubCutaneous every 12 hours  insulin lispro (ADMELOG) corrective regimen sliding scale   SubCutaneous every 6 hours  LORazepam     Tablet 1 milliGRAM(s) Oral every 4 hours  methocarbamol 500 milliGRAM(s) Oral two times a day  pantoprazole  Injectable 40 milliGRAM(s) IV Push every 12 hours  piperacillin/tazobactam IVPB.. 3.375 Gram(s) IV Intermittent every 8 hours  polyethylene glycol 3350 17 Gram(s) Oral every 12 hours  senna 2 Tablet(s) Oral at bedtime  simethicone 80 milliGRAM(s) Chew every 6 hours  sucralfate suspension 1 Gram(s) Enteral Tube every 6 hours    MEDICATIONS  (PRN):  acetaminophen     Tablet .. 650 milliGRAM(s) Oral every 6 hours PRN Temp greater or equal to 38C (100.4F), Mild Pain (1 - 3)  midazolam Injectable 2 milliGRAM(s) IV Push every 2 hours PRN Seizure activity      Allergies    codeine (Hives)    Intolerances        Vital Signs Last 24 Hrs  T(C): 36.7 (19 Dec 2021 08:30), Max: 37.2 (18 Dec 2021 15:47)  T(F): 98.1 (19 Dec 2021 08:30), Max: 99 (18 Dec 2021 15:47)  HR: 67 (19 Dec 2021 15:12) (62 - 97)  BP: 109/57 (19 Dec 2021 14:00) (109/57 - 132/56)  BP(mean): 72 (19 Dec 2021 14:00) (72 - 88)  RR: 18 (19 Dec 2021 14:00) (14 - 22)  SpO2: 99% (19 Dec 2021 15:12) (96% - 100%)    PHYSICAL EXAM:  GENERAL: No Distress; Sedated and Trach to Vent   HEAD:  Atraumatic, Normocephalic  ENMT: Moist mucous membranes,   NECK: Trach to Vent  CHEST/LUNG: Coarse BS Bilaterally   HEART: Regular rate and rhythm; No murmurs, rubs, or gallops  ABDOMEN: Soft; + PEG TUBE   EXTREMITIES:  2+ Peripheral Pulses, No clubbing, cyanosis, or edema      LABS:                        10.8   8.64  )-----------( 387      ( 19 Dec 2021 06:53 )             36.1     19 Dec 2021 06:53    139    |  103    |  21     ----------------------------<  156    5.2     |  27     |  0.98     Ca    9.2        19 Dec 2021 06:53  Phos  3.4       19 Dec 2021 06:53  Mg     2.2       19 Dec 2021 06:53          CAPILLARY BLOOD GLUCOSE      POCT Blood Glucose.: 163 mg/dL (19 Dec 2021 11:32)  POCT Blood Glucose.: 157 mg/dL (19 Dec 2021 06:01)  POCT Blood Glucose.: 127 mg/dL (18 Dec 2021 23:21)  POCT Blood Glucose.: 182 mg/dL (18 Dec 2021 18:15)      RADIOLOGY & ADDITIONAL TESTS:    Imaging Personally Reviewed:  [ ] YES     Consultant(s) Notes Reviewed:      Care Discussed with Consultants/Other Providers:    Advanced Directives: [ ] DNR  [ ] No feeding tube  [ ] MOLST in chart  [ ] MOLST completed today  [ ] Unknown

## 2021-12-19 NOTE — PROGRESS NOTE ADULT - ASSESSMENT
This is a 76-year-old with h/o cardiac arrest, Trach and PEG episodes of altered mental status and a history of shaking.  Metabolic encephalopathy secondary to underlying infectious type process and possibly underlying pneumonia.  For episodes of abnormal movements - nonepileptic in nature.    Poor quality of life.  Poor prognosis.  Being followed by multispecialties.  Supportive care.    Greater than 30 minutes of time was spent with the patient, plan of care, reviewing data, and speaking to the multidisciplinary healthcare team with greater than 50% of that time in counseling and care coordination.

## 2021-12-19 NOTE — PROGRESS NOTE ADULT - ASSESSMENT
75 [place] [facility] [place]. Patient unable to contribute to history. Had labs drawn this afternoon and reportedly has a Hgb of 6. Patient does not have a vaughn catheter. No report of fever, vomiting, bleeding. Receiving IV Zosyn through PICC line rt arm    PEG replaced  ID note reviewed  CM notes reviewed -     was not able to transport for [facility] testing  Versed - Ativan - regimen -   on Zosyn  2 diff GI MDs notes reviewed - not a candidate for EGD  on TF - s/p IVF -   on Hep sq dvt p  GI follow up - serial Hgb  ct imaging reviewed - poss PNA -   vs noted  HD reviewed       HCP  Pt is full code  CCM and Cardio notes reviewed  lives in SNF  vent dep -   end stage dementia  trach and peg  HOB elev  asp prec  oral hygiene  skin care  assist with all needs - ADL  work up in progress

## 2021-12-19 NOTE — PROGRESS NOTE ADULT - SUBJECTIVE AND OBJECTIVE BOX
Neurology Follow up note    Covering Dr. Kip BECKHAM, BREA 76y Female    HPI: ***Patient with dementia and is not responsive.  Collateral information obtained from chart and notes.***    75F with chronic resp failure - Trached - vent dependent, hx of subarachnoid hemorrhage, hx of cardiac arrest, dementia s/p PEG, HTN, DM2 on insulin, hx of CVA, GERD, COPD, admission here from 9/25 to 10/4 and 11/7-11/11 for respiratory failure/sepsis possibly 2/2 aspiration vs vent associated PNA who presents from [facility] for abnormal labs.  Patient does not contribute to history. In the ED, patient's initial triage vitals were BP 88/37, HR 81, RR 18, 100% on vent and T 99 F.  Labs showed leukocytosis of 12.18, HGB 5.2, BUN/Cr 89/2.00. CXR: Tracheostomy cannula reidentified in position. No change heart mediastinum. Widespread bilateral airspace disease slightly improved.  correlate for pulmonary edema and/or infection. No significant pleural effusion. No pneumothorax. Patient's hand projects over portion of the right base. CT chest and A/P pending  (08 Dec 2021 15:50)    Interval History -    Patient is seen, chart was reviewed and case was discussed with the treatment team.  Trached on vent.    Vital Signs Last 24 Hrs    T(C): 36.7 (19 Dec 2021 08:30), Max: 37.2 (18 Dec 2021 15:47)  T(F): 98.1 (19 Dec 2021 08:30), Max: 99 (18 Dec 2021 15:47)  HR: 76 (19 Dec 2021 12:00) (62 - 97)  BP: 132/56 (19 Dec 2021 12:00) (112/56 - 155/71)  BP(mean): 78 (19 Dec 2021 12:00) (73 - 94)  RR: 18 (19 Dec 2021 12:00) (14 - 22)  SpO2: 98% (19 Dec 2021 12:00) (96% - 100%)    MEDICATIONS  (STANDING):    ALBUTerol    90 MICROgram(s) HFA Inhaler 2 Puff(s) Inhalation every 6 hours  chlorhexidine 0.12% Liquid 15 milliLiter(s) Oral Mucosa every 12 hours  dextrose 40% Gel 15 Gram(s) Oral once  dextrose 5%. 1000 milliLiter(s) (50 mL/Hr) IV Continuous <Continuous>  dextrose 5%. 1000 milliLiter(s) (100 mL/Hr) IV Continuous <Continuous>  dextrose 50% Injectable 25 Gram(s) IV Push once  dextrose 50% Injectable 12.5 Gram(s) IV Push once  dextrose 50% Injectable 25 Gram(s) IV Push once  glucagon  Injectable 1 milliGRAM(s) IntraMuscular once  heparin   Injectable 5000 Unit(s) SubCutaneous every 12 hours  insulin lispro (ADMELOG) corrective regimen sliding scale   SubCutaneous every 6 hours  LORazepam     Tablet 1 milliGRAM(s) Oral every 4 hours  methocarbamol 500 milliGRAM(s) Oral two times a day  pantoprazole  Injectable 40 milliGRAM(s) IV Push every 12 hours  piperacillin/tazobactam IVPB.. 3.375 Gram(s) IV Intermittent every 8 hours  polyethylene glycol 3350 17 Gram(s) Oral every 12 hours  senna 2 Tablet(s) Oral at bedtime  simethicone 80 milliGRAM(s) Chew every 6 hours  sucralfate suspension 1 Gram(s) Enteral Tube every 6 hours    MEDICATIONS  (PRN):    acetaminophen     Tablet .. 650 milliGRAM(s) Oral every 6 hours PRN Temp greater or equal to 38C (100.4F), Mild Pain (1 - 3)  midazolam Injectable 2 milliGRAM(s) IV Push every 2 hours PRN Seizure activity    Allergies    codeine (Hives)    PHYSICAL EXAMINATION:        HEENT:  Head:  Normocephalic.  Eyes:  No scleral icterus.  Ears:  Hard to evaluate secondary to the patient being nonverbal.  NECK:  Had increased tone, tracheostomy was in place.    RESPIRATORY:  Decreased breath sounds bilaterally.    CARDIOVASCULAR:  S1 and S2 heard.    ABDOMEN:  Soft and nontender.  PEG in place.  EXTREMITIES:  No clubbing or cyanosis was noted.      NEUROLOGIC:      Trached on vent.  Asleep.    Upon stimulation of the patient, her eyes would roll up, with subtle shaking.    Pupils are 2.5 mm reacting to light. No obvious facial asymmetry  Speech:  Nonverbal.    Moves extremities sightly on stimulation. Tone in all four extremities increased.  Bilateral upper extremities were in a flexed position.  Bilateral lower extremities were in the straight position.  DTR 2 plus all over.  Neck is supple.      LABS:    CBC Full  -  ( 19 Dec 2021 06:53 )  WBC Count : 8.64 K/uL  RBC Count : 4.25 M/uL  Hemoglobin : 10.8 g/dL  Hematocrit : 36.1 %  Platelet Count - Automated : 387 K/uL  Mean Cell Volume : 84.9 fl  Mean Cell Hemoglobin : 25.4 pg  Mean Cell Hemoglobin Concentration : 29.9 gm/dL    12-19    139  |  103  |  21  ----------------------------<  156<H>  5.2   |  27  |  0.98    Ca    9.2      19 Dec 2021 06:53  Phos  3.4     12-19  Mg     2.2     12-19    TPro  6.3  /  Alb  2.2<L>  /  TBili  0.4  /  DBili  x   /  AST  14  /  ALT  15  /  AlkPhos  130<H>  12-18    Radiology -     < from: CT Abdomen and Pelvis No Cont (12.08.21 @ 16:52) >    Limited study without IV contrast.    CHEST:    1.Tracheostomy. Distal airways impaction and multifocal pneumonia, possibly related to aspiration. Most of the opacities are either new or unchanged since prior study. However, volume loss of the right lower lobe has improved since 11/7/2021. Small left pleural effusion and trace right pleural effusion, similar to prior. Follow to resolution with repeat chest CT in 4 weeks.  2. Esophagus is distended with air probable left-sided diverticulum at the thyroid level, image 15 series 4, similar to prior.  3. Coronary artery calcification and mitral calcification. Low attenuation of the blood pool of the heart may reflect anemia.    ABDOMEN/PELVIS:    1. Mild chest and abdominal wall soft tissue edema. Symmetric appearance of the abdominal wall and retroperitoneal musculature, without evidence of hematoma. Correlate with clinical status of the patient, serial hemoglobin and hematocrit to determine need for follow-up postcontrast imaging.  2. No hydronephrosis of the kidneys.    --- End of Report ---      < end of copied text >

## 2021-12-19 NOTE — PROGRESS NOTE ADULT - SUBJECTIVE AND OBJECTIVE BOX
Date/Time Patient Seen:  		  Referring MD:   Data Reviewed	       Patient is a 76y old  Female who presents with a chief complaint of Anemia (19 Dec 2021 09:07)      Subjective/HPI     PAST MEDICAL & SURGICAL HISTORY:  Dementia of frontal lobe type    Aphasic stroke    Diabetes mellitus    Respiratory failure    Hypertension    GERD (gastroesophageal reflux disease)    Constipation    Respiratory failure    CVA (cerebral vascular accident)    HTN (hypertension)    DM (diabetes mellitus)    Advanced dementia    COVID-19 virus detected    Quadriplegia    Pneumonia    Type II diabetes mellitus    Hx of appendectomy    Gastrostomy in place    Tracheostomy in place    Tracheostomy tube present    Feeding by G-tube          Medication list         MEDICATIONS  (STANDING):  ALBUTerol    90 MICROgram(s) HFA Inhaler 2 Puff(s) Inhalation every 6 hours  chlorhexidine 0.12% Liquid 15 milliLiter(s) Oral Mucosa every 12 hours  dextrose 40% Gel 15 Gram(s) Oral once  dextrose 5%. 1000 milliLiter(s) (50 mL/Hr) IV Continuous <Continuous>  dextrose 5%. 1000 milliLiter(s) (100 mL/Hr) IV Continuous <Continuous>  dextrose 50% Injectable 25 Gram(s) IV Push once  dextrose 50% Injectable 12.5 Gram(s) IV Push once  dextrose 50% Injectable 25 Gram(s) IV Push once  glucagon  Injectable 1 milliGRAM(s) IntraMuscular once  heparin   Injectable 5000 Unit(s) SubCutaneous every 12 hours  insulin lispro (ADMELOG) corrective regimen sliding scale   SubCutaneous every 6 hours  LORazepam     Tablet 1 milliGRAM(s) Oral every 4 hours  methocarbamol 500 milliGRAM(s) Oral two times a day  pantoprazole  Injectable 40 milliGRAM(s) IV Push every 12 hours  piperacillin/tazobactam IVPB.. 3.375 Gram(s) IV Intermittent every 8 hours  polyethylene glycol 3350 17 Gram(s) Oral every 12 hours  senna 2 Tablet(s) Oral at bedtime  simethicone 80 milliGRAM(s) Chew every 6 hours  sucralfate suspension 1 Gram(s) Enteral Tube every 6 hours    MEDICATIONS  (PRN):  acetaminophen     Tablet .. 650 milliGRAM(s) Oral every 6 hours PRN Temp greater or equal to 38C (100.4F), Mild Pain (1 - 3)  midazolam Injectable 2 milliGRAM(s) IV Push every 2 hours PRN Seizure activity         Vitals log        ICU Vital Signs Last 24 Hrs  T(C): 37.1 (19 Dec 2021 03:52), Max: 37.2 (18 Dec 2021 15:47)  T(F): 98.7 (19 Dec 2021 03:52), Max: 99 (18 Dec 2021 15:47)  HR: 65 (19 Dec 2021 07:54) (62 - 97)  BP: 121/73 (19 Dec 2021 06:00) (112/56 - 155/71)  BP(mean): 88 (19 Dec 2021 06:00) (73 - 94)  ABP: --  ABP(mean): --  RR: 16 (19 Dec 2021 06:00) (16 - 24)  SpO2: 100% (19 Dec 2021 07:54) (95% - 100%)       Mode: AC/ CMV (Assist Control/ Continuous Mandatory Ventilation)  RR (machine): 18  TV (machine): 400  FiO2: 40  PEEP: 5  ITime: 1  MAP: 12  PIP: 34      Input and Output:  I&O's Detail    18 Dec 2021 07:01  -  19 Dec 2021 07:00  --------------------------------------------------------  IN:    Enteral Tube Flush: 300 mL    Glucerna 1.5: 600 mL    IV PiggyBack: 200 mL  Total IN: 1100 mL    OUT:  Total OUT: 0 mL    Total NET: 1100 mL          Lab Data                        10.8   8.64  )-----------( 387      ( 19 Dec 2021 06:53 )             36.1     12-19    139  |  103  |  21  ----------------------------<  156<H>  5.2   |  27  |  0.98    Ca    9.2      19 Dec 2021 06:53  Phos  3.4     12-19  Mg     2.2     12-19    TPro  6.3  /  Alb  2.2<L>  /  TBili  0.4  /  DBili  x   /  AST  14  /  ALT  15  /  AlkPhos  130<H>  12-18            Review of Systems	      Objective     Physical Examination    heart s1s2  lung dec BS  trach  peg      Pertinent Lab findings & Imaging      Annalise:  NO   Adequate UO     I&O's Detail    18 Dec 2021 07:01  -  19 Dec 2021 07:00  --------------------------------------------------------  IN:    Enteral Tube Flush: 300 mL    Glucerna 1.5: 600 mL    IV PiggyBack: 200 mL  Total IN: 1100 mL    OUT:  Total OUT: 0 mL    Total NET: 1100 mL               Discussed with:     Cultures:	        Radiology

## 2021-12-19 NOTE — PROGRESS NOTE ADULT - SUBJECTIVE AND OBJECTIVE BOX
Patient is a 76y Female with a known history of :  Uncontrolled type 2 diabetes mellitus [E11.65]    Anemia of chronic disease [D63.8]      HPI:  ***Patient with dementia and is not responsive.  Collateral information obtained from chart and notes.***    75F with chronic resp failure - vent dependent, hx of subarachnoid hemorrhage, hx of cardiac arrest, dementia s/p PEG, HTN, DM2 on insulin, hx of CVA, GERD, COPD, admission here from 9/25 to 10/4 and 11/7-11/11 for respiratory failure/sepsis possibly 2/2 aspiration vs vent associated PNA who presents from [facility] for abnormal labs.  Patient does not contribute to history. In the ED, patient's initial triage vitals were BP 88/37, HR 81, RR 18, 100% on vent and T 99 F.  Labs showed leukocytosis of 12.18, HGB 5.2, BUN/Cr 89/2.00. CXR: Tracheostomy cannula reidentified in position. No change heart mediastinum. Widespread bilateral airspace disease slightly improved.  correlate for pulmonary edema and/or infection. No significant pleural effusion. No pneumothorax. Patient's hand projects over portion of the right base. CT chest and A/P pending  (08 Dec 2021 15:50)      REVIEW OF SYSTEMS:    CONSTITUTIONAL: No fever, weight loss, or fatigue  EYES: No eye pain, visual disturbances, or discharge  ENMT:  No difficulty hearing, tinnitus, vertigo; No sinus or throat pain  NECK: No pain or stiffness  BREASTS: No pain, masses, or nipple discharge  RESPIRATORY: No cough, wheezing, chills or hemoptysis; No shortness of breath  CARDIOVASCULAR: No chest pain, palpitations, dizziness, or leg swelling  GASTROINTESTINAL: No abdominal or epigastric pain. No nausea, vomiting, or hematemesis; No diarrhea or constipation. No melena or hematochezia.  GENITOURINARY: No dysuria, frequency, hematuria, or incontinence  NEUROLOGICAL: No headaches, memory loss, loss of strength, numbness, or tremors  SKIN: No itching, burning, rashes, or lesions   LYMPH NODES: No enlarged glands  ENDOCRINE: No heat or cold intolerance; No hair loss  MUSCULOSKELETAL: No joint pain or swelling; No muscle, back, or extremity pain  PSYCHIATRIC: No depression, anxiety, mood swings, or difficulty sleeping  HEME/LYMPH: No easy bruising, or bleeding gums  ALLERGY AND IMMUNOLOGIC: No hives or eczema    MEDICATIONS  (STANDING):  ALBUTerol    90 MICROgram(s) HFA Inhaler 2 Puff(s) Inhalation every 6 hours  chlorhexidine 0.12% Liquid 15 milliLiter(s) Oral Mucosa every 12 hours  dextrose 40% Gel 15 Gram(s) Oral once  dextrose 5%. 1000 milliLiter(s) (50 mL/Hr) IV Continuous <Continuous>  dextrose 5%. 1000 milliLiter(s) (100 mL/Hr) IV Continuous <Continuous>  dextrose 50% Injectable 25 Gram(s) IV Push once  dextrose 50% Injectable 12.5 Gram(s) IV Push once  dextrose 50% Injectable 25 Gram(s) IV Push once  glucagon  Injectable 1 milliGRAM(s) IntraMuscular once  heparin   Injectable 5000 Unit(s) SubCutaneous every 12 hours  insulin lispro (ADMELOG) corrective regimen sliding scale   SubCutaneous every 6 hours  LORazepam     Tablet 1 milliGRAM(s) Oral every 4 hours  methocarbamol 500 milliGRAM(s) Oral two times a day  pantoprazole  Injectable 40 milliGRAM(s) IV Push every 12 hours  piperacillin/tazobactam IVPB.. 3.375 Gram(s) IV Intermittent every 8 hours  polyethylene glycol 3350 17 Gram(s) Oral every 12 hours  senna 2 Tablet(s) Oral at bedtime  simethicone 80 milliGRAM(s) Chew every 6 hours  sucralfate suspension 1 Gram(s) Enteral Tube every 6 hours    MEDICATIONS  (PRN):  acetaminophen     Tablet .. 650 milliGRAM(s) Oral every 6 hours PRN Temp greater or equal to 38C (100.4F), Mild Pain (1 - 3)  midazolam Injectable 2 milliGRAM(s) IV Push every 2 hours PRN Seizure activity      ALLERGIES: codeine (Hives)      FAMILY HISTORY:  No pertinent family history in first degree relatives        Social history:  Alochol:   Smoking:   Drug Use:   Marital Status:     PHYSICAL EXAMINATION:  -----------------------------  T(C): 36.7 (12-19-21 @ 08:30), Max: 37.2 (12-18-21 @ 15:47)  HR: 75 (12-19-21 @ 11:06) (62 - 97)  BP: 127/62 (12-19-21 @ 10:00) (112/56 - 155/71)  RR: 14 (12-19-21 @ 10:00) (14 - 24)  SpO2: 97% (12-19-21 @ 11:06) (96% - 100%)  Wt(kg): --    12-18 @ 07:01  -  12-19 @ 07:00  --------------------------------------------------------  IN:    Enteral Tube Flush: 300 mL    Glucerna 1.5: 600 mL    IV PiggyBack: 200 mL  Total IN: 1100 mL    OUT:  Total OUT: 0 mL    Total NET: 1100 mL            Constitutional: well developed, normal appearance, well groomed, well nourished, no deformities and no acute distress.   Eyes: the conjunctiva exhibited no abnormalities and the eyelids demonstrated no xanthelasmas.   HEENT: normal oral mucosa, no oral pallor and no oral cyanosis.   Neck: normal jugular venous A waves present, normal jugular venous V waves present and no jugular venous obregon A waves.   Pulmonary: no respiratory distress, normal respiratory rhythm and effort, no accessory muscle use and lungs were clear to auscultation bilaterally. Anteriorly  Cardiovascular: heart rate and rhythm were normal, normal S1 and S2 and no murmur, gallop, rub, heave or thrill are present.   Musculoskeletal: the gait could not be assessed.   Extremities: no clubbing of the fingernails, no localized cyanosis, no petechial hemorrhages and no ischemic changes.   Skin: normal skin color and pigmentation, no rash, no venous stasis, no skin lesions, no skin ulcer and no xanthoma was observed.     LABS:   --------  12-19    139  |  103  |  21  ----------------------------<  156<H>  5.2   |  27  |  0.98    Ca    9.2      19 Dec 2021 06:53  Phos  3.4     12-19  Mg     2.2     12-19    TPro  6.3  /  Alb  2.2<L>  /  TBili  0.4  /  DBili  x   /  AST  14  /  ALT  15  /  AlkPhos  130<H>  12-18                         10.8   8.64  )-----------( 387      ( 19 Dec 2021 06:53 )             36.1                   Radiology:

## 2021-12-20 VITALS
SYSTOLIC BLOOD PRESSURE: 118 MMHG | OXYGEN SATURATION: 100 % | DIASTOLIC BLOOD PRESSURE: 64 MMHG | HEART RATE: 62 BPM | RESPIRATION RATE: 13 BRPM

## 2021-12-20 LAB
GLUCOSE BLDC GLUCOMTR-MCNC: 125 MG/DL — HIGH (ref 70–99)
GLUCOSE BLDC GLUCOMTR-MCNC: 140 MG/DL — HIGH (ref 70–99)
SARS-COV-2 RNA SPEC QL NAA+PROBE: SIGNIFICANT CHANGE UP

## 2021-12-20 PROCEDURE — 99239 HOSP IP/OBS DSCHRG MGMT >30: CPT

## 2021-12-20 RX ORDER — BNT162B2 0.23 MG/2.25ML
0.3 INJECTION, SUSPENSION INTRAMUSCULAR ONCE
Refills: 0 | Status: COMPLETED | OUTPATIENT
Start: 2021-12-20 | End: 2021-12-20

## 2021-12-20 RX ADMIN — ALBUTEROL 2 PUFF(S): 90 AEROSOL, METERED ORAL at 00:45

## 2021-12-20 RX ADMIN — ALBUTEROL 2 PUFF(S): 90 AEROSOL, METERED ORAL at 06:58

## 2021-12-20 RX ADMIN — Medication 1 GRAM(S): at 06:22

## 2021-12-20 RX ADMIN — BNT162B2 0.3 MILLILITER(S): 0.23 INJECTION, SUSPENSION INTRAMUSCULAR at 16:02

## 2021-12-20 RX ADMIN — PANTOPRAZOLE SODIUM 40 MILLIGRAM(S): 20 TABLET, DELAYED RELEASE ORAL at 06:23

## 2021-12-20 RX ADMIN — Medication 1 MILLIGRAM(S): at 10:33

## 2021-12-20 RX ADMIN — PIPERACILLIN AND TAZOBACTAM 25 GRAM(S): 4; .5 INJECTION, POWDER, LYOPHILIZED, FOR SOLUTION INTRAVENOUS at 13:23

## 2021-12-20 RX ADMIN — Medication 1 MILLIGRAM(S): at 13:24

## 2021-12-20 RX ADMIN — METHOCARBAMOL 500 MILLIGRAM(S): 500 TABLET, FILM COATED ORAL at 06:22

## 2021-12-20 RX ADMIN — Medication 1 GRAM(S): at 13:23

## 2021-12-20 RX ADMIN — ALBUTEROL 2 PUFF(S): 90 AEROSOL, METERED ORAL at 12:50

## 2021-12-20 RX ADMIN — Medication 1 MILLIGRAM(S): at 03:25

## 2021-12-20 RX ADMIN — SIMETHICONE 80 MILLIGRAM(S): 80 TABLET, CHEWABLE ORAL at 13:24

## 2021-12-20 RX ADMIN — Medication 1 MILLIGRAM(S): at 16:44

## 2021-12-20 RX ADMIN — PIPERACILLIN AND TAZOBACTAM 25 GRAM(S): 4; .5 INJECTION, POWDER, LYOPHILIZED, FOR SOLUTION INTRAVENOUS at 06:22

## 2021-12-20 RX ADMIN — HEPARIN SODIUM 5000 UNIT(S): 5000 INJECTION INTRAVENOUS; SUBCUTANEOUS at 06:23

## 2021-12-20 RX ADMIN — CHLORHEXIDINE GLUCONATE 15 MILLILITER(S): 213 SOLUTION TOPICAL at 06:23

## 2021-12-20 RX ADMIN — SIMETHICONE 80 MILLIGRAM(S): 80 TABLET, CHEWABLE ORAL at 06:22

## 2021-12-20 RX ADMIN — Medication 1 MILLIGRAM(S): at 06:57

## 2021-12-20 NOTE — PROGRESS NOTE ADULT - SUBJECTIVE AND OBJECTIVE BOX
Chief Complaint: Abnormal labs    Interval Events: No events overnight.    Review of Systems:  Unable to obtain    Physical Exam:  Vital Signs Last 24 Hrs  T(C): 36.2 (20 Dec 2021 08:11), Max: 36.9 (19 Dec 2021 21:47)  T(F): 97.2 (20 Dec 2021 08:11), Max: 98.4 (19 Dec 2021 21:47)  HR: 64 (20 Dec 2021 12:55) (61 - 75)  BP: 132/59 (20 Dec 2021 12:00) (114/59 - 139/63)  BP(mean): 81 (20 Dec 2021 12:00) (72 - 86)  RR: 14 (20 Dec 2021 12:00) (12 - 18)  SpO2: 100% (20 Dec 2021 12:55) (99% - 100%)  General: Unresponsive  HEENT: Trach  Neck: No JVD, no carotid bruit  Lungs: CTAB  CV: RRR, nl S1/S2, no M/R/G  Abdomen: S/NT/ND, +BS  Extremities: No LE edema, no cyanosis  Neuro: AAOx0  Skin: No rash    Labs:    12-19    139  |  103  |  21  ----------------------------<  156<H>  5.2   |  27  |  0.98    Ca    9.2      19 Dec 2021 06:53  Phos  3.4     12-19  Mg     2.2     12-19                          10.8   8.64  )-----------( 387      ( 19 Dec 2021 06:53 )             36.1     Telemetry: Sinus rhythm

## 2021-12-20 NOTE — PROGRESS NOTE ADULT - SUBJECTIVE AND OBJECTIVE BOX
[facility]    Allergies    codeine (Hives)    Intolerances        PAST MEDICAL & SURGICAL HISTORY:  Dementia of frontal lobe type    Aphasic stroke    Diabetes mellitus    Respiratory failure    Hypertension    GERD (gastroesophageal reflux disease)    Constipation    Respiratory failure    CVA (cerebral vascular accident)    HTN (hypertension)    DM (diabetes mellitus)    Advanced dementia    COVID-19 virus detected    Quadriplegia    Pneumonia    Type II diabetes mellitus    Hx of appendectomy    Gastrostomy in place    Tracheostomy in place    Tracheostomy tube present    Feeding by G-tube        FAMILY HISTORY:  No pertinent family history in first degree relatives        Home Medications:  albuterol 90 mcg/inh inhalation aerosol: 2 puff(s) inhaled every 6 hours (08 Dec 2021 16:51)  Bacid (LAC) oral tablet: 2 tab(s) by gastrostomy tube once a day (08 Dec 2021 16:51)  Carafate 1 g/10 mL oral suspension: 10 milliliter(s) by gastrostomy tube 4 times a day (before meals and at bedtime) for 14 days (Started 6/4/21) (08 Dec 2021 16:51)  chlorhexidine 0.12% mucous membrane liquid: 15 milliliter(s) mucous membrane 2 times a day (08 Dec 2021 16:51)  insulin lispro 100 units/mL injectable solution: injectable 3 times a day ; sliding scale coverage  (14 Dec 2021 10:38)  ipratropium-albuterol 0.5 mg-2.5 mg/3 mL inhalation solution: 3 milliliter(s) inhaled 4 times a day (08 Dec 2021 16:51)  LORazepam 1 mg oral tablet: 1 tab(s) orally every 4 hours (20 Dec 2021 08:32)  methocarbamol 500 mg oral tablet: 1 tab(s) orally 2 times a day (14 Dec 2021 19:18)  pantoprazole 40 mg oral granule, delayed release: 40 milligram(s) by gastrostomy tube 2 times a day (08 Dec 2021 16:51)  polyethylene glycol 3350 oral powder for reconstitution: 17 gram(s) orally every 12 hours (08 Dec 2021 16:51)  ProAir HFA 90 mcg/inh inhalation aerosol: 2 puff(s) inhaled every 6 hours (08 Dec 2021 16:51)  senna 8.6 mg oral tablet: 3 tab(s) by gastrostomy tube once a day (at bedtime) (08 Dec 2021 16:51)  simethicone 80 mg oral tablet, chewable: 1 tab(s) orally every 6 hours (08 Dec 2021 16:51)  Tylenol 325 mg oral tablet: 2 tab(s) by gastrostomy tube once a day; 60 minutes prior to dressing change  (08 Dec 2021 16:51)      MEDICATIONS  (STANDING):  ALBUTerol    90 MICROgram(s) HFA Inhaler 2 Puff(s) Inhalation every 6 hours  chlorhexidine 0.12% Liquid 15 milliLiter(s) Oral Mucosa every 12 hours  dextrose 40% Gel 15 Gram(s) Oral once  dextrose 5%. 1000 milliLiter(s) (50 mL/Hr) IV Continuous <Continuous>  dextrose 5%. 1000 milliLiter(s) (100 mL/Hr) IV Continuous <Continuous>  dextrose 50% Injectable 25 Gram(s) IV Push once  dextrose 50% Injectable 25 Gram(s) IV Push once  dextrose 50% Injectable 12.5 Gram(s) IV Push once  glucagon  Injectable 1 milliGRAM(s) IntraMuscular once  heparin   Injectable 5000 Unit(s) SubCutaneous every 12 hours  insulin lispro (ADMELOG) corrective regimen sliding scale   SubCutaneous every 6 hours  LORazepam     Tablet 1 milliGRAM(s) Oral every 4 hours  methocarbamol 500 milliGRAM(s) Oral two times a day  pantoprazole   Suspension 40 milliGRAM(s) Oral two times a day  piperacillin/tazobactam IVPB.. 3.375 Gram(s) IV Intermittent every 8 hours  polyethylene glycol 3350 17 Gram(s) Oral every 12 hours  senna 2 Tablet(s) Oral at bedtime  simethicone 80 milliGRAM(s) Chew every 6 hours  sucralfate suspension 1 Gram(s) Enteral Tube every 6 hours    MEDICATIONS  (PRN):  acetaminophen     Tablet .. 650 milliGRAM(s) Oral every 6 hours PRN Temp greater or equal to 38C (100.4F), Mild Pain (1 - 3)      Diet, NPO with Tube Feed:   Tube Feeding Modality: Gastrostomy  Glucerna 1.5 Horacio  Total Volume for 24 Hours (mL): 1000  Continuous  Starting Tube Feed Rate mL per Hour: 20  Increase Tube Feed Rate by (mL): 10     Every 6 hours  Until Goal Tube Feed Rate (mL per Hour): 50  Tube Feed Duration (in Hours): 20  Tube Feed Start Time: 17:00 (12-09-21 @ 16:49) [Active]          Vital Signs Last 24 Hrs  T(C): 36.2 (20 Dec 2021 08:11), Max: 36.9 (19 Dec 2021 21:47)  T(F): 97.2 (20 Dec 2021 08:11), Max: 98.4 (19 Dec 2021 21:47)  HR: 61 (20 Dec 2021 06:49) (61 - 76)  BP: 114/59 (20 Dec 2021 06:00) (109/57 - 139/63)  BP(mean): 74 (20 Dec 2021 06:00) (72 - 86)  RR: 18 (20 Dec 2021 06:00) (14 - 18)  SpO2: 100% (20 Dec 2021 06:49) (97% - 100%)      12-19-21 @ 07:01  -  12-20-21 @ 07:00  --------------------------------------------------------  IN: 1400 mL / OUT: 1500 mL / NET: -100 mL        Mode: AC/ CMV (Assist Control/ Continuous Mandatory Ventilation), RR (machine): 18, TV (machine): 400, FiO2: 40, PEEP: 5, ITime: 1, MAP: 12, PIP: 34      LABS:                        10.8   8.64  )-----------( 387      ( 19 Dec 2021 06:53 )             36.1     12-19    139  |  103  |  21  ----------------------------<  156<H>  5.2   |  27  |  0.98    Ca    9.2      19 Dec 2021 06:53  Phos  3.4     12-19  Mg     2.2     12-19                WBC:  WBC Count: 8.64 K/uL (12-19 @ 06:53)  WBC Count: 7.15 K/uL (12-18 @ 06:24)  WBC Count: 7.94 K/uL (12-17 @ 07:01)      MICROBIOLOGY:  RECENT CULTURES:  12-14 .Blood Blood XXXX XXXX   No Growth Final                    Sodium:  Sodium, Serum: 139 mmol/L (12-19 @ 06:53)  Sodium, Serum: 138 mmol/L (12-18 @ 06:24)  Sodium, Serum: 138 mmol/L (12-17 @ 07:01)      0.98 mg/dL 12-19 @ 06:53  0.90 mg/dL 12-18 @ 06:24  0.88 mg/dL 12-17 @ 07:01      Hemoglobin:  Hemoglobin: 10.8 g/dL (12-19 @ 06:53)  Hemoglobin: 10.5 g/dL (12-18 @ 06:24)  Hemoglobin: 10.2 g/dL (12-17 @ 07:01)      Platelets: Platelet Count - Automated: 387 K/uL (12-19 @ 06:53)  Platelet Count - Automated: 344 K/uL (12-18 @ 06:24)  Platelet Count - Automated: 336 K/uL (12-17 @ 07:01)              RADIOLOGY & ADDITIONAL STUDIES:      MICROBIOLOGY:  RECENT CULTURES:  12-14 .Blood Blood XXXX XXXX   No Growth Final

## 2021-12-20 NOTE — PROGRESS NOTE ADULT - SUBJECTIVE AND OBJECTIVE BOX
Date/Time Patient Seen:  		  Referring MD:   Data Reviewed	       Patient is a 76y old  Female who presents with a chief complaint of Anemia (19 Dec 2021 13:00)      Subjective/HPI     PAST MEDICAL & SURGICAL HISTORY:  Dementia of frontal lobe type    Aphasic stroke    Diabetes mellitus    Respiratory failure    Hypertension    GERD (gastroesophageal reflux disease)    Constipation    Respiratory failure    CVA (cerebral vascular accident)    HTN (hypertension)    DM (diabetes mellitus)    Advanced dementia    COVID-19 virus detected    Quadriplegia    Pneumonia    Type II diabetes mellitus    Hx of appendectomy    Gastrostomy in place    Tracheostomy in place    Tracheostomy tube present    Feeding by G-tube          Medication list         MEDICATIONS  (STANDING):  ALBUTerol    90 MICROgram(s) HFA Inhaler 2 Puff(s) Inhalation every 6 hours  chlorhexidine 0.12% Liquid 15 milliLiter(s) Oral Mucosa every 12 hours  dextrose 40% Gel 15 Gram(s) Oral once  dextrose 5%. 1000 milliLiter(s) (50 mL/Hr) IV Continuous <Continuous>  dextrose 5%. 1000 milliLiter(s) (100 mL/Hr) IV Continuous <Continuous>  dextrose 50% Injectable 25 Gram(s) IV Push once  dextrose 50% Injectable 25 Gram(s) IV Push once  dextrose 50% Injectable 12.5 Gram(s) IV Push once  glucagon  Injectable 1 milliGRAM(s) IntraMuscular once  heparin   Injectable 5000 Unit(s) SubCutaneous every 12 hours  insulin lispro (ADMELOG) corrective regimen sliding scale   SubCutaneous every 6 hours  LORazepam     Tablet 1 milliGRAM(s) Oral every 4 hours  methocarbamol 500 milliGRAM(s) Oral two times a day  pantoprazole   Suspension 40 milliGRAM(s) Oral two times a day  piperacillin/tazobactam IVPB.. 3.375 Gram(s) IV Intermittent every 8 hours  polyethylene glycol 3350 17 Gram(s) Oral every 12 hours  senna 2 Tablet(s) Oral at bedtime  simethicone 80 milliGRAM(s) Chew every 6 hours  sucralfate suspension 1 Gram(s) Enteral Tube every 6 hours    MEDICATIONS  (PRN):  acetaminophen     Tablet .. 650 milliGRAM(s) Oral every 6 hours PRN Temp greater or equal to 38C (100.4F), Mild Pain (1 - 3)         Vitals log        ICU Vital Signs Last 24 Hrs  T(C): 36.6 (20 Dec 2021 03:48), Max: 36.9 (19 Dec 2021 21:47)  T(F): 97.9 (20 Dec 2021 03:48), Max: 98.4 (19 Dec 2021 21:47)  HR: 61 (20 Dec 2021 06:49) (61 - 76)  BP: 114/59 (20 Dec 2021 06:00) (109/57 - 139/63)  BP(mean): 74 (20 Dec 2021 06:00) (72 - 86)  ABP: --  ABP(mean): --  RR: 18 (20 Dec 2021 06:00) (14 - 18)  SpO2: 100% (20 Dec 2021 06:49) (97% - 100%)       Mode: AC/ CMV (Assist Control/ Continuous Mandatory Ventilation)  RR (machine): 18  TV (machine): 400  FiO2: 40  PEEP: 5  ITime: 1  MAP: 12  PIP: 34      Input and Output:  I&O's Detail    19 Dec 2021 07:01  -  20 Dec 2021 07:00  --------------------------------------------------------  IN:    Glucerna 1.5: 1200 mL    IV PiggyBack: 200 mL  Total IN: 1400 mL    OUT:    Voided (mL): 1500 mL  Total OUT: 1500 mL    Total NET: -100 mL          Lab Data                        10.8   8.64  )-----------( 387      ( 19 Dec 2021 06:53 )             36.1     12-19    139  |  103  |  21  ----------------------------<  156<H>  5.2   |  27  |  0.98    Ca    9.2      19 Dec 2021 06:53  Phos  3.4     12-19  Mg     2.2     12-19              Review of Systems	      Objective     Physical Examination  heart s1s2  lung dec BS  abd  soft      Pertinent Lab findings & Imaging      Annalise:  NO   Adequate UO     I&O's Detail    19 Dec 2021 07:01  -  20 Dec 2021 07:00  --------------------------------------------------------  IN:    Glucerna 1.5: 1200 mL    IV PiggyBack: 200 mL  Total IN: 1400 mL    OUT:    Voided (mL): 1500 mL  Total OUT: 1500 mL    Total NET: -100 mL               Discussed with:     Cultures:	        Radiology

## 2021-12-20 NOTE — PROGRESS NOTE ADULT - ASSESSMENT
75F Chronic Respiratory Failure, Dementia, HTN, DM2, COPD, admitted for Acute Encephalopathy and Aspiration PNA    Aspiration PNA / Chronic Respiratory Failure / COPD - Chronic Respiratory Failure and Vent Dependant from history of SAH and Cardiac Arrest. Sepsis POA and on IV Zosyn.  Persistent leukocytosis. Albuterol PRN Pulmonary and Infectious Disease following.       Abnormal Body Movements - EEG was planned to rule out epileptic activity but Neurology does not feel this is epileptic in nature due to movements being provoked by agitation. Ativan added to regimen. Spouse feels this is due to GI and Oxygenation related issues and we will not pursue EEG anymore due to low yield.  Had BM last night. Neurology following.     Anemia - Possible GI Bleed. GI consulted with no plans for scope.  Will monitor and transfuse as needed.     CKD 3 - Baseline Creatinine 1.0. Avoid Nephrotoxic agents and monitor BMP and electrolytes. Nephrology following.     PEG Tube Place Malfunction - PEG Tube replaced 12/13    GERD - PPI     Diet - Regular    DVT Prophylaxis - Heparin SC TID     Disposition -  Patient is medically optimized at this point for discharge back to facility.

## 2021-12-20 NOTE — PROGRESS NOTE ADULT - ASSESSMENT
CHAPINCITO GUIDRY 75 f [facility] S 12/8/2021   DR ILIANA KEMP     REVIEW OF SYMPTOMS      Able to give (reliable) ROS  NO     PHYSICAL EXAM    HEENT Unremarkable  atraumatic   RESP Fair air entry EXP prolonged    Harsh breath sound Resp distres mild   CARDIAC S1 S2 No S3     NO JVD    ABDOMEN SOFT BS PRESENT NOT DISTENDED No hepatosplenomegaly   PEDAL EDEMA present No calf tenderness  NO rash     ________________  Age DOA CC.  76 year old female with a history of Pneumonia  HTN, DM, CVA, dementia, chronic respiratory failure s/p trach, PEG, cardiac arrest who presented 12/8/2021 from NH with abnormal labs. She was found to have a hemoglobin of 6. No further history could be obtained from patient.  Pulm crit care consulted 12/8/2021     _____                            GOC.12/8/2021 Full code   COVID STATUS. 12/8/2021 scv2 (-)   ICU STAY. Admitted ICU 12/8/2021   ABIO.   12/11 sputum stenotrophomonas levaq sens  12/11 sputum CRP Pseudomonas  12/11 sputu serratia   12/13/2021 Levaquin 750 1 dose Dr Medellin  12/14 zosyn x 7d Dr Tay      BEST PRACTICE ISSUES.                                                  HEAD OF BED ELEVATION. Yes  DVT PROPHYLAXIS.      12/10 [facility]            SQUIRES PROPHYLAXIS.      12/8/2021 IV protonix 40.2       12/9/2021 carafate 1.4                                                  DIET.            12/9/2021 glucerna 1.5 1000 12/9  INFECTION PROPHYLAXIS.    12/8/2021 chlorhexidine 0.12% bid     GENERAL ISSUES                                       ALLERGY.         codeine                   WEIGHT.           12/8/2021 61                           BMI.                   12/8/2021 22  SPEECH SWALLOW RECOMMENDATIONS.     PATIENT DATA   VITALS/PO/IO/VENT/DRIPS.   12/20/2021 afeb 81 110/70   12/20/2021 ac 18/400/4/.4       ASSESSMENT/RECOMMENDATIONS.    COPD pmh.   12/9/2021 albuterol hf.4   12/9/2021 Spiriva   Cont Rx      VENT MANAGEMENT.  VENT DEPENDENT RESP FAILURE pmh.  12/17/2021 40% 18 400 5 746/34/104  cxr 12/17/2021 cxr arm across r lower lung l base opac  V duplx 12/16 v duplx (-)    gas exchange ok      HEMODYNAMICS.   HYPOTENSION poa.   Echo 9/8/2021 ECHO n lvsf mild dd pasp 51   BP has improvd   target map 65 (+)     INFECTION.   Pneumonia  poa 12/8/2021 12/11 sputum stenotrophomonas levaq sens  12/11 sputum CRP Pseudomonas  12/11 sputu serratia   w 12/9-12/11-12/14/2021 w 8.8 - 6.9-17   12/9 pr 6.7   CXR 12/14/2021 bl opac poss effsns maddie l side   CXR 12/8 widespread airspace dis sl improvd cw 11/9/2021 12/8 ct cap distal airway impaction and mf pneum related to aspirat   Changes are either new or unchanged   Volume loss rll has improvd since 11/7/2021   Small l effsn     12/8 mrsa (-)   12/8 blod c (-)  12/8 urine c (-)   12/11 sputum stenotrophomonas levaq sens  12/11 sputum CRP Pseudomonas  12/11 sputu serratia   12/13/2021 Levaquin 750 1 dose Dr Medellin  12/14 zosyn x 7d Dr Tay    SEVERE ANEMIA poa   Hb 12/8-12/8-12/9-12/11-12/12-12/13-12/14-12/17-12/19/2021   Hb 5.2 -9.7-8.6 - 9.4-9.1-8.9 -9.6- 10.2-10.8  Hb stable     GERD pmh.  On ppi     sp PEG pmh    GI BLEED poa 12/8 12/9/2021 SOB (+)   12/9/2021 Seen by Dr Nieves No intervention pplannd   12/8/2021 IV protonix 40.2       12/9/2021 carafate 1.4                12/9/2021 Dr BETH vinesed dvt pplx \[facility] [place] [facility]\""c   12/11/2021 gi not planning any pproced      OVERALL ISSUES  76 full code Anoxic encephalopathy vdrf peg trach patient      ANEMIA Monitor Hb   PNEUM zosyn (crp) One dose levaq 12/13 (steno)  SEIZURES 12/14/2021     TIME SPENT   Over 36 minutes aggregate critical care time spent on encounter; activities included   direct patient care, counseling and/or coordinating care reviewing notes, lab data/ imaging , discussion with multidisciplinary team/ patient  /family and explaining in detail risks, benefits, alternatives  of the recommendations     CHAPINCITO GUIDRY 75 f [facility] S 12/8/2021   DR ILIANA KEMP

## 2021-12-20 NOTE — PROGRESS NOTE ADULT - SUBJECTIVE AND OBJECTIVE BOX
[facility] DIVISION of INFECTIOUS DISEASE  Arturo Olivas MD PhD, Haylee Umanzor MD, Andressa Brantley MD, Billy Valenzuela MD, Emil Medellin MD  and providing coverage with Alexa Canas MD and Eusebio Chairez MD  Providing Infectious Disease Consultations at [facility], [facility]    Office# [phone] to schedule follow up appointments  Answering Service for urgent calls or New Consults [phone]  Cell# to text for urgent issues Arturo Olivas [phone]     infectious diseases progress note:    BREA BECKHAM is a 76y y. o. Female patient    No concerning overnight events    Allergies    codeine (Hives)    Intolerances        ANTIBIOTICS/RELEVANT:  antimicrobials  piperacillin/tazobactam IVPB.. 3.375 Gram(s) IV Intermittent every 8 hours    immunologic:    OTHER:  acetaminophen     Tablet .. 650 milliGRAM(s) Oral every 6 hours PRN  ALBUTerol    90 MICROgram(s) HFA Inhaler 2 Puff(s) Inhalation every 6 hours  chlorhexidine 0.12% Liquid 15 milliLiter(s) Oral Mucosa every 12 hours  dextrose 40% Gel 15 Gram(s) Oral once  dextrose 5%. 1000 milliLiter(s) IV Continuous <Continuous>  dextrose 5%. 1000 milliLiter(s) IV Continuous <Continuous>  dextrose 50% Injectable 25 Gram(s) IV Push once  dextrose 50% Injectable 25 Gram(s) IV Push once  dextrose 50% Injectable 12.5 Gram(s) IV Push once  glucagon  Injectable 1 milliGRAM(s) IntraMuscular once  heparin   Injectable 5000 Unit(s) SubCutaneous every 12 hours  insulin lispro (ADMELOG) corrective regimen sliding scale   SubCutaneous every 6 hours  LORazepam     Tablet 1 milliGRAM(s) Oral every 4 hours  methocarbamol 500 milliGRAM(s) Oral two times a day  pantoprazole   Suspension 40 milliGRAM(s) Oral two times a day  polyethylene glycol 3350 17 Gram(s) Oral every 12 hours  senna 2 Tablet(s) Oral at bedtime  simethicone 80 milliGRAM(s) Chew every 6 hours  sucralfate suspension 1 Gram(s) Enteral Tube every 6 hours      Objective:  Vital Signs Last 24 Hrs  T(C): 36.2 (20 Dec 2021 08:11), Max: 36.9 (19 Dec 2021 21:47)  T(F): 97.2 (20 Dec 2021 08:11), Max: 98.4 (19 Dec 2021 21:47)  HR: 61 (20 Dec 2021 06:49) (61 - 76)  BP: 114/59 (20 Dec 2021 06:00) (109/57 - 139/63)  BP(mean): 74 (20 Dec 2021 06:00) (72 - 86)  RR: 18 (20 Dec 2021 06:00) (14 - 18)  SpO2: 100% (20 Dec 2021 06:49) (97% - 100%)    T(C): 36.2 (12-20-21 @ 08:11), Max: 37.2 (12-18-21 @ 15:47)  T(C): 36.2 (12-20-21 @ 08:11), Max: 37.2 (12-17-21 @ 12:49)  T(C): 36.2 (12-20-21 @ 08:11), Max: 37.2 (12-17-21 @ 12:49)    PHYSICAL EXAM:  HEENT: NC atraumatic  Neck: supple, intubated via trach  Respiratory: no accessory muscle use, breathing comfortably  Cardiovascular: distant  Gastrointestinal: normal appearing, nondistended  Extremities: no clubbing, no cyanosis,    Mode: AC/ CMV (Assist Control/ Continuous Mandatory Ventilation), RR (machine): 18, TV (machine): 400, FiO2: 40, PEEP: 5, ITime: 1, MAP: 12, PIP: 34    LABS:                          10.8   8.64  )-----------( 387      ( 19 Dec 2021 06:53 )             36.1       WBC  8.64 12-19 @ 06:53  7.15 12-18 @ 06:24  7.94 12-17 @ 07:01  17.35 12-14 @ 13:58      12-19    139  |  103  |  21  ----------------------------<  156<H>  5.2   |  27  |  0.98    Ca    9.2      19 Dec 2021 06:53  Phos  3.4     12-19  Mg     2.2     12-19        Creatinine, Serum: 0.98 mg/dL (12-19-21 @ 06:53)  Creatinine, Serum: 0.90 mg/dL (12-18-21 @ 06:24)  Creatinine, Serum: 0.88 mg/dL (12-17-21 @ 07:01)  Creatinine, Serum: 1.10 mg/dL (12-15-21 @ 07:27)  Creatinine, Serum: 1.20 mg/dL (12-14-21 @ 15:28)                INFLAMMATORY MARKERS  Auto Lymphocyte #: 0.37 K/uL (12-14-21 @ 13:58)  Auto Neutrophil #: 16.02 K/uL (12-14-21 @ 13:58)  Auto Lymphocyte #: 0.23 K/uL (12-10-21 @ 08:38)  Auto Neutrophil #: 12.03 K/uL (12-10-21 @ 08:38)  Auto Neutrophil #: 10.16 K/uL (12-08-21 @ 11:58)  Auto Lymphocyte #: 0.82 K/uL (12-08-21 @ 11:58)    Lactate, Blood: 1.4 mmol/L (12-09-21 @ 07:09)  Lactate, Blood: 1.9 mmol/L (12-08-21 @ 11:58)    Auto Eosinophil #: 0.01 K/uL (12-14-21 @ 13:58)  Auto Eosinophil #: 0.02 K/uL (12-10-21 @ 08:38)  Auto Eosinophil #: 0.22 K/uL (12-08-21 @ 11:58)        Procalcitonin, Serum: 6.72 ng/mL (12-09-21 @ 14:07)            Ferritin, Serum: 267 ng/mL (12-13-21 @ 14:05)        INR: 1.10 ratio (12-09-21 @ 07:09)  Activated Partial Thromboplastin Time: 33.8 sec (12-08-21 @ 11:58)  INR: 1.16 ratio (12-08-21 @ 11:58)          MICROBIOLOGY:              RADIOLOGY & ADDITIONAL STUDIES:

## 2021-12-20 NOTE — PROGRESS NOTE ADULT - ASSESSMENT
75F with chronic resp failure - vent dependent, hx of subarachnoid hemorrhage, hx of cardiac arrest, dementia s/p PEG, HTN, DM2 on insulin, hx of CVA, GERD, COPD, admission here from 9/25 to 10/4 and 11/7-11/11 for respiratory failure/sepsis possibly 2/2 aspiration vs vent associated PNA who presents from [facility] w leukocytosis of 12.18, and concerns for repeat aspiration PNA    CT-Distal airways impaction and multifocal pneumonia, possibly related to aspiration. Most of the opacities are either new or unchanged since prior study.     Recurrent VAP, Acute on Chronic Hypoxic Respiratory Failure  -infectious w/u reviewed  --12/11 RCx w/ CRE Pseudomonas, Stenotrophomonas and Serratia marcescens  s/p course of zosyn (12/8-12/14), s/p levofloxacin 750mg IV x 1 on 12/13  leukocytosis possible reactive 2/2 seizure vs aspiration pneumonia or pneumonitis during seizure  -imaging reviewed  CXR 12/14 w/ increased bibasilar infiltrates compared with 12/8; vettings back to FiO2 of 40%   CXR 12/16 w/ improved aeration of L lung, b/l lower lobe opacification; suspect progression of prior aspiration PNA which will take time to resolve on imaging vs pulmonary edema given rapid improvement on CXR  -trend temps/WBC  --leukocytosis resolved  -vent management per pulm/ICU  -zosyn restarted on 12/14; continue for now. plan for last dose Today Monday 12/20 then fine for dc per ID    Thank you for consulting us and involving us in the management of this most interesting and challenging case.  Please call us at [phone] or text me directly on my cell#[phone] with any concerns or further questions.

## 2021-12-20 NOTE — PROGRESS NOTE ADULT - ASSESSMENT
77 yo F sent from [facility] home with abnormal labs. Patient unable to contribute to history. Had labs drawn this afternoon and reportedly has a Hgb of 6. Patient does not have a vaughn catheter. No report of fever, vomiting, bleeding. Receiving IV Zosyn through PICC line rt arm    PEG replaced  ID note reviewed  CM notes reviewed -     was not able to transport for [facility] testing  on Ativan  on Zosyn  2 diff GI MDs notes reviewed - not a candidate for EGD  on TF - s/p IVF -   on Hep sq dvt p  GI follow up - serial Hgb  ct imaging reviewed - poss PNA -   vs noted  HD reviewed       HCP  Pt is full code  CCM and Cardio notes reviewed  lives in SNF  vent dep -   end stage dementia  trach and peg  HOB elev  asp prec  oral hygiene  skin care  assist with all needs - ADL  work up in progress

## 2021-12-20 NOTE — PROGRESS NOTE ADULT - SUBJECTIVE AND OBJECTIVE BOX
Neurology Follow up note    Covering Dr. Kip BECKHAM, BREA 76y Female    HPI: ***Patient with dementia and is not responsive.  Collateral information obtained from chart and notes.***    75F with chronic resp failure - Trached - vent dependent, hx of subarachnoid hemorrhage, hx of cardiac arrest, dementia s/p PEG, HTN, DM2 on insulin, hx of CVA, GERD, COPD, admission here from 9/25 to 10/4 and 11/7-11/11 for respiratory failure/sepsis possibly 2/2 aspiration vs vent associated PNA who presents from [facility] for abnormal labs.  Patient does not contribute to history. In the ED, patient's initial triage vitals were BP 88/37, HR 81, RR 18, 100% on vent and T 99 F.  Labs showed leukocytosis of 12.18, HGB 5.2, BUN/Cr 89/2.00. CXR: Tracheostomy cannula reidentified in position. No change heart mediastinum. Widespread bilateral airspace disease slightly improved.  correlate for pulmonary edema and/or infection. No significant pleural effusion. No pneumothorax. Patient's hand projects over portion of the right base. CT chest and A/P pending  (08 Dec 2021 15:50)    Interval History -    Patient is seen, chart was reviewed and case was discussed with the treatment team.  Trached on vent.  No seizure reported.    Vital Signs Last 24 Hrs    T(C): 36.2 (20 Dec 2021 08:11), Max: 36.9 (19 Dec 2021 21:47)  T(F): 97.2 (20 Dec 2021 08:11), Max: 98.4 (19 Dec 2021 21:47)  HR: 62 (20 Dec 2021 12:00) (61 - 75)  BP: 132/59 (20 Dec 2021 12:00) (109/57 - 139/63)  BP(mean): 81 (20 Dec 2021 12:00) (72 - 86)  RR: 14 (20 Dec 2021 12:00) (12 - 18)  SpO2: 100% (20 Dec 2021 12:00) (99% - 100%)    MEDICATIONS  (STANDING):    ALBUTerol    90 MICROgram(s) HFA Inhaler 2 Puff(s) Inhalation every 6 hours  chlorhexidine 0.12% Liquid 15 milliLiter(s) Oral Mucosa every 12 hours  dextrose 40% Gel 15 Gram(s) Oral once  dextrose 5%. 1000 milliLiter(s) (50 mL/Hr) IV Continuous <Continuous>  dextrose 5%. 1000 milliLiter(s) (100 mL/Hr) IV Continuous <Continuous>  dextrose 50% Injectable 25 Gram(s) IV Push once  dextrose 50% Injectable 25 Gram(s) IV Push once  dextrose 50% Injectable 12.5 Gram(s) IV Push once  glucagon  Injectable 1 milliGRAM(s) IntraMuscular once  heparin   Injectable 5000 Unit(s) SubCutaneous every 12 hours  insulin lispro (ADMELOG) corrective regimen sliding scale   SubCutaneous every 6 hours  LORazepam     Tablet 1 milliGRAM(s) Oral every 4 hours  methocarbamol 500 milliGRAM(s) Oral two times a day  pantoprazole   Suspension 40 milliGRAM(s) Oral two times a day  piperacillin/tazobactam IVPB.. 3.375 Gram(s) IV Intermittent every 8 hours  polyethylene glycol 3350 17 Gram(s) Oral every 12 hours  senna 2 Tablet(s) Oral at bedtime  simethicone 80 milliGRAM(s) Chew every 6 hours  sucralfate suspension 1 Gram(s) Enteral Tube every 6 hours    MEDICATIONS  (PRN):    acetaminophen     Tablet .. 650 milliGRAM(s) Oral every 6 hours PRN Temp greater or equal to 38C (100.4F), Mild Pain (1 - 3)    Allergies    codeine (Hives)    PHYSICAL EXAMINATION:        HEENT:  Head:  Normocephalic.  Eyes:  No scleral icterus.  Ears:  Hard to evaluate secondary to the patient being nonverbal.  NECK:  Had increased tone, tracheostomy was in place.    RESPIRATORY:  Decreased breath sounds bilaterally.    CARDIOVASCULAR:  S1 and S2 heard.    ABDOMEN:  Soft and nontender.  PEG in place.  EXTREMITIES:  No clubbing or cyanosis was noted.      NEUROLOGIC:      Trached on vent.  Asleep.    Responds to stimulation.  Not able to follow any commands.   Pupils are 2.5 mm reacting to light. No obvious facial asymmetry  Speech:  Nonverbal.    Moves extremities sightly on stimulation. Tone in all four extremities increased.  Bilateral upper extremities were in a flexed position.  Bilateral lower extremities were in the straight position.  DTR 2 plus all over.  Neck is supple.      LABS:    CBC Full  -  ( 19 Dec 2021 06:53 )  WBC Count : 8.64 K/uL  RBC Count : 4.25 M/uL  Hemoglobin : 10.8 g/dL  Hematocrit : 36.1 %  Platelet Count - Automated : 387 K/uL  Mean Cell Volume : 84.9 fl  Mean Cell Hemoglobin : 25.4 pg  Mean Cell Hemoglobin Concentration : 29.9 gm/dL    12-19    139  |  103  |  21  ----------------------------<  156<H>  5.2   |  27  |  0.98    Ca    9.2      19 Dec 2021 06:53  Phos  3.4     12-19  Mg     2.2     12-19    TPro  6.3  /  Alb  2.2<L>  /  TBili  0.4  /  DBili  x   /  AST  14  /  ALT  15  /  AlkPhos  130<H>  12-18    Radiology -     < from: CT Abdomen and Pelvis No Cont (12.08.21 @ 16:52) >    Limited study without IV contrast.    CHEST:    1.Tracheostomy. Distal airways impaction and multifocal pneumonia, possibly related to aspiration. Most of the opacities are either new or unchanged since prior study. However, volume loss of the right lower lobe has improved since 11/7/2021. Small left pleural effusion and trace right pleural effusion, similar to prior. Follow to resolution with repeat chest CT in 4 weeks.  2. Esophagus is distended with air probable left-sided diverticulum at the thyroid level, image 15 series 4, similar to prior.  3. Coronary artery calcification and mitral calcification. Low attenuation of the blood pool of the heart may reflect anemia.    ABDOMEN/PELVIS:    1. Mild chest and abdominal wall soft tissue edema. Symmetric appearance of the abdominal wall and retroperitoneal musculature, without evidence of hematoma. Correlate with clinical status of the patient, serial hemoglobin and hematocrit to determine need for follow-up postcontrast imaging.  2. No hydronephrosis of the kidneys.    --- End of Report ---      < end of copied text >

## 2021-12-20 NOTE — PROGRESS NOTE ADULT - ASSESSMENT
This is a 76-year-old with h/o cardiac arrest, Trach and PEG episodes of altered mental status and a history of shaking.  Metabolic encephalopathy secondary to underlying infectious type process and possibly underlying pneumonia.  For episodes of abnormal movements - nonepileptic in nature.    No indications for anti seizure meds.  Poor quality of life.  Poor prognosis.  Being followed by multispecialties.  Supportive care.    Greater than 30 minutes of time was spent with the patient, plan of care, reviewing data, and speaking to the multidisciplinary healthcare team with greater than 50% of that time in counseling and care coordination.

## 2021-12-20 NOTE — PROGRESS NOTE ADULT - SUBJECTIVE AND OBJECTIVE BOX
Patient is a 76y Female whom presented to the hospital with ckd and manasa     PAST MEDICAL & SURGICAL HISTORY:  Dementia of frontal lobe type    Aphasic stroke    Diabetes mellitus    Respiratory failure    Hypertension    GERD (gastroesophageal reflux disease)    Constipation    Respiratory failure    CVA (cerebral vascular accident)    HTN (hypertension)    DM (diabetes mellitus)    Advanced dementia    COVID-19 virus detected    Quadriplegia    Pneumonia    Type II diabetes mellitus    Hx of appendectomy    Gastrostomy in place    Tracheostomy in place    Tracheostomy tube present    Feeding by G-tube        MEDICATIONS  (STANDING):  ALBUTerol    90 MICROgram(s) HFA Inhaler 2 Puff(s) Inhalation every 6 hours  chlorhexidine 0.12% Liquid 15 milliLiter(s) Oral Mucosa every 12 hours  dextrose 40% Gel 15 Gram(s) Oral once  dextrose 5%. 1000 milliLiter(s) (50 mL/Hr) IV Continuous <Continuous>  dextrose 5%. 1000 milliLiter(s) (100 mL/Hr) IV Continuous <Continuous>  dextrose 50% Injectable 25 Gram(s) IV Push once  dextrose 50% Injectable 12.5 Gram(s) IV Push once  dextrose 50% Injectable 25 Gram(s) IV Push once  glucagon  Injectable 1 milliGRAM(s) IntraMuscular once  insulin lispro (ADMELOG) corrective regimen sliding scale   SubCutaneous every 6 hours  lactated ringers. 1000 milliLiter(s) (100 mL/Hr) IV Continuous <Continuous>  methocarbamol 250 milliGRAM(s) Oral two times a day  pantoprazole  Injectable 40 milliGRAM(s) IV Push every 12 hours  piperacillin/tazobactam IVPB.. 3.375 Gram(s) IV Intermittent every 8 hours  polyethylene glycol 3350 17 Gram(s) Oral every 12 hours  senna 2 Tablet(s) Oral at bedtime  simethicone 80 milliGRAM(s) Chew every 6 hours  sucralfate 1 Gram(s) Oral every 6 hours  tiotropium 18 MICROgram(s) Capsule 1 Capsule(s) Inhalation daily  vancomycin  IVPB 750 milliGRAM(s) IV Intermittent every 12 hours      Allergies                          10.8   8.64  )-----------( 387      ( 19 Dec 2021 06:53 )             36.1       CBC Full  -  ( 19 Dec 2021 06:53 )  WBC Count : 8.64 K/uL  RBC Count : 4.25 M/uL  Hemoglobin : 10.8 g/dL  Hematocrit : 36.1 %  Platelet Count - Automated : 387 K/uL  Mean Cell Volume : 84.9 fl  Mean Cell Hemoglobin : 25.4 pg  Mean Cell Hemoglobin Concentration : 29.9 gm/dL  Auto Neutrophil # : x  Auto Lymphocyte # : x  Auto Monocyte # : x  Auto Eosinophil # : x  Auto Basophil # : x  Auto Neutrophil % : x  Auto Lymphocyte % : x  Auto Monocyte % : x  Auto Eosinophil % : x  Auto Basophil % : x      12-19    139  |  103  |  21  ----------------------------<  156<H>  5.2   |  27  |  0.98    Ca    9.2      19 Dec 2021 06:53  Phos  3.4     12-19  Mg     2.2     12-19        CAPILLARY BLOOD GLUCOSE      POCT Blood Glucose.: 140 mg/dL (20 Dec 2021 10:40)  POCT Blood Glucose.: 125 mg/dL (20 Dec 2021 06:47)  POCT Blood Glucose.: 159 mg/dL (19 Dec 2021 23:56)      Vital Signs Last 24 Hrs  T(C): 36.2 (20 Dec 2021 16:01), Max: 36.9 (19 Dec 2021 21:47)  T(F): 97.1 (20 Dec 2021 16:01), Max: 98.4 (19 Dec 2021 21:47)  HR: 62 (20 Dec 2021 16:15) (61 - 75)  BP: 118/64 (20 Dec 2021 16:15) (111/74 - 139/63)  BP(mean): 83 (20 Dec 2021 16:15) (72 - 86)  RR: 13 (20 Dec 2021 16:15) (12 - 22)  SpO2: 100% (20 Dec 2021 16:15) (99% - 100%)        codeine (Hives)    Intolerances        SOCIAL HISTORY:  Denies ETOh,Smoking,     FAMILY HISTORY:  No pertinent family history in first degree relatives        REVIEW OF SYSTEMS:    unable to obtained a good review system                                              10.8   8.64  )-----------( 387      ( 19 Dec 2021 06:53 )             36.1       CBC Full  -  ( 19 Dec 2021 06:53 )  WBC Count : 8.64 K/uL  RBC Count : 4.25 M/uL  Hemoglobin : 10.8 g/dL  Hematocrit : 36.1 %  Platelet Count - Automated : 387 K/uL  Mean Cell Volume : 84.9 fl  Mean Cell Hemoglobin : 25.4 pg  Mean Cell Hemoglobin Concentration : 29.9 gm/dL  Auto Neutrophil # : x  Auto Lymphocyte # : x  Auto Monocyte # : x  Auto Eosinophil # : x  Auto Basophil # : x  Auto Neutrophil % : x  Auto Lymphocyte % : x  Auto Monocyte % : x  Auto Eosinophil % : x  Auto Basophil % : x      12-19    139  |  103  |  21  ----------------------------<  156<H>  5.2   |  27  |  0.98    Ca    9.2      19 Dec 2021 06:53  Phos  3.4     12-19  Mg     2.2     12-19        CAPILLARY BLOOD GLUCOSE      POCT Blood Glucose.: 140 mg/dL (20 Dec 2021 10:40)  POCT Blood Glucose.: 125 mg/dL (20 Dec 2021 06:47)  POCT Blood Glucose.: 159 mg/dL (19 Dec 2021 23:56)      Vital Signs Last 24 Hrs  T(C): 36.2 (20 Dec 2021 16:01), Max: 36.9 (19 Dec 2021 21:47)  T(F): 97.1 (20 Dec 2021 16:01), Max: 98.4 (19 Dec 2021 21:47)  HR: 62 (20 Dec 2021 16:15) (61 - 75)  BP: 118/64 (20 Dec 2021 16:15) (111/74 - 139/63)  BP(mean): 83 (20 Dec 2021 16:15) (72 - 86)  RR: 13 (20 Dec 2021 16:15) (12 - 22)  SpO2: 100% (20 Dec 2021 16:15) (99% - 100%)                                PHYSICAL EXAM:    Constitutional: NAD  Neck:  No JVD  Respiratory: pos decrease bs pos trach  Cardiovascular: S1 and S2  Gastrointestinal: BS+, soft, pos peg   Extremities: No peripheral edema

## 2021-12-20 NOTE — PROGRESS NOTE ADULT - NUTRITIONAL ASSESSMENT
MEDICATIONS  (STANDING):  ALBUTerol    90 MICROgram(s) HFA Inhaler 2 Puff(s) Inhalation every 6 hours  chlorhexidine 0.12% Liquid 15 milliLiter(s) Oral Mucosa every 12 hours  dextrose 40% Gel 15 Gram(s) Oral once  dextrose 5%. 1000 milliLiter(s) (50 mL/Hr) IV Continuous <Continuous>  dextrose 5%. 1000 milliLiter(s) (100 mL/Hr) IV Continuous <Continuous>  dextrose 50% Injectable 25 Gram(s) IV Push once  dextrose 50% Injectable 12.5 Gram(s) IV Push once  dextrose 50% Injectable 25 Gram(s) IV Push once  glucagon  Injectable 1 milliGRAM(s) IntraMuscular once  heparin   Injectable 5000 Unit(s) SubCutaneous every 12 hours  insulin lispro (ADMELOG) corrective regimen sliding scale   SubCutaneous every 6 hours  methocarbamol 250 milliGRAM(s) Oral two times a day  midodrine 5 milliGRAM(s) Oral every 8 hours  pantoprazole  Injectable 40 milliGRAM(s) IV Push every 12 hours  piperacillin/tazobactam IVPB.. 3.375 Gram(s) IV Intermittent every 8 hours  polyethylene glycol 3350 17 Gram(s) Oral every 12 hours  senna 2 Tablet(s) Oral at bedtime  simethicone 80 milliGRAM(s) Chew every 6 hours  sodium chloride 0.9%. 1000 milliLiter(s) (65 mL/Hr) IV Continuous <Continuous>  sucralfate suspension 1 Gram(s) Enteral Tube every 6 hours
MEDICATIONS  (STANDING):  ALBUTerol    90 MICROgram(s) HFA Inhaler 2 Puff(s) Inhalation every 6 hours  chlorhexidine 0.12% Liquid 15 milliLiter(s) Oral Mucosa every 12 hours  dextrose 40% Gel 15 Gram(s) Oral once  dextrose 5%. 1000 milliLiter(s) (50 mL/Hr) IV Continuous <Continuous>  dextrose 5%. 1000 milliLiter(s) (100 mL/Hr) IV Continuous <Continuous>  dextrose 50% Injectable 25 Gram(s) IV Push once  dextrose 50% Injectable 12.5 Gram(s) IV Push once  dextrose 50% Injectable 25 Gram(s) IV Push once  glucagon  Injectable 1 milliGRAM(s) IntraMuscular once  insulin lispro (ADMELOG) corrective regimen sliding scale   SubCutaneous every 6 hours  methocarbamol 250 milliGRAM(s) Oral two times a day  midodrine 5 milliGRAM(s) Oral every 8 hours  pantoprazole  Injectable 40 milliGRAM(s) IV Push every 12 hours  piperacillin/tazobactam IVPB.. 3.375 Gram(s) IV Intermittent every 8 hours  polyethylene glycol 3350 17 Gram(s) Oral every 12 hours  senna 2 Tablet(s) Oral at bedtime  simethicone 80 milliGRAM(s) Chew every 6 hours  sodium chloride 0.9%. 1000 milliLiter(s) (75 mL/Hr) IV Continuous <Continuous>  sucralfate suspension 1 Gram(s) Enteral Tube every 6 hours  tiotropium 18 MICROgram(s) Capsule 1 Capsule(s) Inhalation daily
MEDICATIONS  (STANDING):  ALBUTerol    90 MICROgram(s) HFA Inhaler 2 Puff(s) Inhalation every 6 hours  chlorhexidine 0.12% Liquid 15 milliLiter(s) Oral Mucosa every 12 hours  dextrose 40% Gel 15 Gram(s) Oral once  dextrose 5%. 1000 milliLiter(s) (50 mL/Hr) IV Continuous <Continuous>  dextrose 5%. 1000 milliLiter(s) (100 mL/Hr) IV Continuous <Continuous>  dextrose 50% Injectable 25 Gram(s) IV Push once  dextrose 50% Injectable 12.5 Gram(s) IV Push once  dextrose 50% Injectable 25 Gram(s) IV Push once  glucagon  Injectable 1 milliGRAM(s) IntraMuscular once  heparin   Injectable 5000 Unit(s) SubCutaneous every 12 hours  insulin lispro (ADMELOG) corrective regimen sliding scale   SubCutaneous every 6 hours  methocarbamol 250 milliGRAM(s) Oral two times a day  midodrine 5 milliGRAM(s) Oral every 8 hours  pantoprazole  Injectable 40 milliGRAM(s) IV Push every 12 hours  piperacillin/tazobactam IVPB.. 3.375 Gram(s) IV Intermittent every 8 hours  polyethylene glycol 3350 17 Gram(s) Oral every 12 hours  senna 2 Tablet(s) Oral at bedtime  simethicone 80 milliGRAM(s) Chew every 6 hours  sodium chloride 0.9%. 1000 milliLiter(s) (65 mL/Hr) IV Continuous <Continuous>  sucralfate suspension 1 Gram(s) Enteral Tube every 6 hours
MEDICATIONS  (STANDING):  ALBUTerol    90 MICROgram(s) HFA Inhaler 2 Puff(s) Inhalation every 6 hours  chlorhexidine 0.12% Liquid 15 milliLiter(s) Oral Mucosa every 12 hours  dextrose 40% Gel 15 Gram(s) Oral once  dextrose 5%. 1000 milliLiter(s) (50 mL/Hr) IV Continuous <Continuous>  dextrose 5%. 1000 milliLiter(s) (100 mL/Hr) IV Continuous <Continuous>  dextrose 50% Injectable 25 Gram(s) IV Push once  dextrose 50% Injectable 12.5 Gram(s) IV Push once  dextrose 50% Injectable 25 Gram(s) IV Push once  glucagon  Injectable 1 milliGRAM(s) IntraMuscular once  insulin lispro (ADMELOG) corrective regimen sliding scale   SubCutaneous every 6 hours  methocarbamol 250 milliGRAM(s) Oral two times a day  midodrine 5 milliGRAM(s) Oral every 8 hours  pantoprazole  Injectable 40 milliGRAM(s) IV Push every 12 hours  piperacillin/tazobactam IVPB.. 3.375 Gram(s) IV Intermittent every 8 hours  polyethylene glycol 3350 17 Gram(s) Oral every 12 hours  senna 2 Tablet(s) Oral at bedtime  simethicone 80 milliGRAM(s) Chew every 6 hours  sodium chloride 0.9%. 1000 milliLiter(s) (75 mL/Hr) IV Continuous <Continuous>  sucralfate suspension 1 Gram(s) Enteral Tube every 6 hours  tiotropium 18 MICROgram(s) Capsule 1 Capsule(s) Inhalation daily

## 2021-12-20 NOTE — PROGRESS NOTE ADULT - ASSESSMENT
75F with chronic resp failure - vent dependent, hx of subarachnoid hemorrhage, hx of cardiac arrest, dementia s/p PEG, HTN, DM2 on insulin, hx of CVA, GERD, COPD, admission here from 9/25 to 10/4 and 11/7-11/11 for respiratory failure/sepsis possibly 2/2 aspiration vs vent associated PNA who presents from [facility] for abnormal labs.  Patient does not contribute to history. In the ED, patient's initial triage vitals were BP 88/37, HR 81, RR 18, 100% on vent and T 99 F.  Labs showed leukocytosis of 12.18, HGB 5.2, BUN/Cr 89/2.00. CXR: Tracheostomy cannula reidentified in position. No change heart mediastinum. Widespread bilateral airspace disease slightly improved.  correlate for pulmonary edema and/or infection. No significant pleural effusion. No pneumothorax. Patient's hand projects over portion of the right base. CT chest and A/P pending  (08 Dec 2021 15:50)      ACUTE RENAL FAILURE: sodium chloride 0.9%. 1000 milliLiter(s) (65 mL/Hr  Serum creatinine is improving    There is no progression . No uremic symptoms  No evidence of anemia .  Fluid status stable.  Will continue to avoid nephrotoxic drugs.  Patient remains asymptomatic.   Continue current therapy.    f/u blood and urine cx,serial lactate levels,monitor vitals roddy raymundo hydration,monitor urine output and renal profile,    hypotension midodrine 5 milliGRAM(s) Oral every 8 hours

## 2021-12-20 NOTE — PROGRESS NOTE ADULT - ASSESSMENT
The patient is a 76 year old female with a history of HTN, DM, CVA, dementia, chronic respiratory failure s/p trach, PEG, cardiac arrest who presents from NH with anemia.    Plan:  - Recent echo with normal LV systolic function, mild pulm HTN  - Hemoglobin improved with transfusion  - Remain off of aspirin  - Seizures - EEG and neuro work-up as indicated  - Mechanical ventilation  - Overall poor prognosis. Comfort care would be most appropriate.  - Discharge planning

## 2021-12-20 NOTE — PROGRESS NOTE ADULT - REASON FOR ADMISSION
Anemia

## 2021-12-20 NOTE — PROGRESS NOTE ADULT - SUBJECTIVE AND OBJECTIVE BOX
Subjective: No overnight events.     MEDICATIONS  (STANDING):  ALBUTerol    90 MICROgram(s) HFA Inhaler 2 Puff(s) Inhalation every 6 hours  chlorhexidine 0.12% Liquid 15 milliLiter(s) Oral Mucosa every 12 hours  dextrose 40% Gel 15 Gram(s) Oral once  dextrose 5%. 1000 milliLiter(s) (50 mL/Hr) IV Continuous <Continuous>  dextrose 5%. 1000 milliLiter(s) (100 mL/Hr) IV Continuous <Continuous>  dextrose 50% Injectable 25 Gram(s) IV Push once  dextrose 50% Injectable 25 Gram(s) IV Push once  dextrose 50% Injectable 12.5 Gram(s) IV Push once  glucagon  Injectable 1 milliGRAM(s) IntraMuscular once  heparin   Injectable 5000 Unit(s) SubCutaneous every 12 hours  insulin lispro (ADMELOG) corrective regimen sliding scale   SubCutaneous every 6 hours  LORazepam     Tablet 1 milliGRAM(s) Oral every 4 hours  methocarbamol 500 milliGRAM(s) Oral two times a day  pantoprazole   Suspension 40 milliGRAM(s) Oral two times a day  piperacillin/tazobactam IVPB.. 3.375 Gram(s) IV Intermittent every 8 hours  polyethylene glycol 3350 17 Gram(s) Oral every 12 hours  senna 2 Tablet(s) Oral at bedtime  simethicone 80 milliGRAM(s) Chew every 6 hours  sucralfate suspension 1 Gram(s) Enteral Tube every 6 hours    MEDICATIONS  (PRN):  acetaminophen     Tablet .. 650 milliGRAM(s) Oral every 6 hours PRN Temp greater or equal to 38C (100.4F), Mild Pain (1 - 3)      Allergies    codeine (Hives)    Intolerances        Vital Signs Last 24 Hrs  T(C): 36.6 (20 Dec 2021 03:48), Max: 36.9 (19 Dec 2021 21:47)  T(F): 97.9 (20 Dec 2021 03:48), Max: 98.4 (19 Dec 2021 21:47)  HR: 61 (20 Dec 2021 06:49) (61 - 76)  BP: 114/59 (20 Dec 2021 06:00) (109/57 - 139/63)  BP(mean): 74 (20 Dec 2021 06:00) (72 - 86)  RR: 18 (20 Dec 2021 06:00) (14 - 18)  SpO2: 100% (20 Dec 2021 06:49) (97% - 100%)    PHYSICAL EXAM:  GENERAL: No Distress; Sedated and Trach to Vent   HEAD:  Atraumatic, Normocephalic  ENMT: Moist mucous membranes,   NECK: Trach to Vent  CHEST/LUNG: Coarse BS Bilaterally   HEART: Regular rate and rhythm; No murmurs, rubs, or gallops  ABDOMEN: Soft; + PEG TUBE   EXTREMITIES:  2+ Peripheral Pulses, No clubbing, cyanosis, or sadie      LABS:      Ca    9.2        19 Dec 2021 06:53          CAPILLARY BLOOD GLUCOSE      POCT Blood Glucose.: 125 mg/dL (20 Dec 2021 06:47)  POCT Blood Glucose.: 159 mg/dL (19 Dec 2021 23:56)  POCT Blood Glucose.: 176 mg/dL (19 Dec 2021 17:52)  POCT Blood Glucose.: 163 mg/dL (19 Dec 2021 11:32)      RADIOLOGY & ADDITIONAL TESTS:    Imaging Personally Reviewed:  [ ] YES     Consultant(s) Notes Reviewed:      Care Discussed with Consultants/Other Providers:    Advanced Directives: [ ] DNR  [ ] No feeding tube  [ ] MOLST in chart  [ ] MOLST completed today  [ ] Unknown

## 2021-12-30 PROCEDURE — 83550 IRON BINDING TEST: CPT

## 2021-12-30 PROCEDURE — 87040 BLOOD CULTURE FOR BACTERIA: CPT

## 2021-12-30 PROCEDURE — 84145 PROCALCITONIN (PCT): CPT

## 2021-12-30 PROCEDURE — 0225U NFCT DS DNA&RNA 21 SARSCOV2: CPT

## 2021-12-30 PROCEDURE — 83735 ASSAY OF MAGNESIUM: CPT

## 2021-12-30 PROCEDURE — 93970 EXTREMITY STUDY: CPT

## 2021-12-30 PROCEDURE — 80048 BASIC METABOLIC PNL TOTAL CA: CPT

## 2021-12-30 PROCEDURE — 36430 TRANSFUSION BLD/BLD COMPNT: CPT

## 2021-12-30 PROCEDURE — 36600 WITHDRAWAL OF ARTERIAL BLOOD: CPT

## 2021-12-30 PROCEDURE — 71250 CT THORAX DX C-: CPT | Mod: MA

## 2021-12-30 PROCEDURE — 85730 THROMBOPLASTIN TIME PARTIAL: CPT

## 2021-12-30 PROCEDURE — 85025 COMPLETE CBC W/AUTO DIFF WBC: CPT

## 2021-12-30 PROCEDURE — 83605 ASSAY OF LACTIC ACID: CPT

## 2021-12-30 PROCEDURE — 49465 FLUORO EXAM OF G/COLON TUBE: CPT

## 2021-12-30 PROCEDURE — 85610 PROTHROMBIN TIME: CPT

## 2021-12-30 PROCEDURE — 84100 ASSAY OF PHOSPHORUS: CPT

## 2021-12-30 PROCEDURE — 87077 CULTURE AEROBIC IDENTIFY: CPT

## 2021-12-30 PROCEDURE — 82803 BLOOD GASES ANY COMBINATION: CPT

## 2021-12-30 PROCEDURE — 80053 COMPREHEN METABOLIC PANEL: CPT

## 2021-12-30 PROCEDURE — 94640 AIRWAY INHALATION TREATMENT: CPT

## 2021-12-30 PROCEDURE — 87640 STAPH A DNA AMP PROBE: CPT

## 2021-12-30 PROCEDURE — 86901 BLOOD TYPING SEROLOGIC RH(D): CPT

## 2021-12-30 PROCEDURE — 94760 N-INVAS EAR/PLS OXIMETRY 1: CPT

## 2021-12-30 PROCEDURE — 74176 CT ABD & PELVIS W/O CONTRAST: CPT | Mod: MA

## 2021-12-30 PROCEDURE — 87186 SC STD MICRODIL/AGAR DIL: CPT

## 2021-12-30 PROCEDURE — 87086 URINE CULTURE/COLONY COUNT: CPT

## 2021-12-30 PROCEDURE — 87635 SARS-COV-2 COVID-19 AMP PRB: CPT

## 2021-12-30 PROCEDURE — 87641 MR-STAPH DNA AMP PROBE: CPT

## 2021-12-30 PROCEDURE — 93005 ELECTROCARDIOGRAM TRACING: CPT

## 2021-12-30 PROCEDURE — 99291 CRITICAL CARE FIRST HOUR: CPT

## 2021-12-30 PROCEDURE — 82962 GLUCOSE BLOOD TEST: CPT

## 2021-12-30 PROCEDURE — 86923 COMPATIBILITY TEST ELECTRIC: CPT

## 2021-12-30 PROCEDURE — 83036 HEMOGLOBIN GLYCOSYLATED A1C: CPT

## 2021-12-30 PROCEDURE — 87070 CULTURE OTHR SPECIMN AEROBIC: CPT

## 2021-12-30 PROCEDURE — 85027 COMPLETE CBC AUTOMATED: CPT

## 2021-12-30 PROCEDURE — 82310 ASSAY OF CALCIUM: CPT

## 2021-12-30 PROCEDURE — 96367 TX/PROPH/DG ADDL SEQ IV INF: CPT

## 2021-12-30 PROCEDURE — 83540 ASSAY OF IRON: CPT

## 2021-12-30 PROCEDURE — 86850 RBC ANTIBODY SCREEN: CPT

## 2021-12-30 PROCEDURE — 86900 BLOOD TYPING SEROLOGIC ABO: CPT

## 2021-12-30 PROCEDURE — 36415 COLL VENOUS BLD VENIPUNCTURE: CPT

## 2021-12-30 PROCEDURE — 71045 X-RAY EXAM CHEST 1 VIEW: CPT

## 2021-12-30 PROCEDURE — 94003 VENT MGMT INPAT SUBQ DAY: CPT

## 2021-12-30 PROCEDURE — P9016: CPT

## 2021-12-30 PROCEDURE — 82272 OCCULT BLD FECES 1-3 TESTS: CPT

## 2021-12-30 PROCEDURE — 94002 VENT MGMT INPAT INIT DAY: CPT

## 2021-12-30 PROCEDURE — 82728 ASSAY OF FERRITIN: CPT

## 2021-12-30 PROCEDURE — 84443 ASSAY THYROID STIM HORMONE: CPT

## 2021-12-30 PROCEDURE — 83970 ASSAY OF PARATHORMONE: CPT

## 2021-12-30 PROCEDURE — 94799 UNLISTED PULMONARY SVC/PX: CPT

## 2021-12-30 PROCEDURE — 81001 URINALYSIS AUTO W/SCOPE: CPT

## 2021-12-30 PROCEDURE — 96365 THER/PROPH/DIAG IV INF INIT: CPT

## 2022-01-03 NOTE — ED ADULT NURSE NOTE - CAS EDN INTEG ASSESS
Detail Level: Detailed Was A Bandage Applied: Yes Punch Size In Mm: 2 Biopsy Type: H and E Anesthesia Type: 1% lidocaine with epinephrine Anesthesia Volume In Cc (Will Not Render If 0): 0.5 Additional Anesthesia Volume In Cc (Will Not Render If 0): 0 Hemostasis: Kulwant's Epidermal Sutures: 5-0 Nylon Wound Care: Petrolatum Dressing: bandage Patient Will Remove Sutures At Home?: No Lab: 6 Lab Facility: 3 Consent: Written consent was obtained and risks were reviewed including but not limited to scarring, infection, bleeding, scabbing, incomplete removal, nerve damage and allergy to anesthesia. Post-Care Instructions: I reviewed with the patient in detail post-care instructions. Patient is to keep the biopsy site dry overnight, and then apply bacitracin twice daily until healed. Patient may apply hydrogen peroxide soaks to remove any crusting. Home Suture Removal Text: Patient was provided a home suture removal kit and will remove their sutures at home.  If they have any questions or difficulties they will call the office. Notification Instructions: Patient will be notified of biopsy results. However, patient instructed to call the office if not contacted within 2 weeks. Billing Type: Third-Party Bill Information: Selecting Yes will display possible errors in your note based on the variables you have selected. This validation is only offered as a suggestion for you. PLEASE NOTE THAT THE VALIDATION TEXT WILL BE REMOVED WHEN YOU FINALIZE YOUR NOTE. IF YOU WANT TO FAX A PRELIMINARY NOTE YOU WILL NEED TO TOGGLE THIS TO 'NO' IF YOU DO NOT WANT IT IN YOUR FAXED NOTE. - - -

## 2022-01-06 ENCOUNTER — INPATIENT (INPATIENT)
Facility: HOSPITAL | Age: 77
LOS: 4 days | Discharge: NURSING, REHAB OR LONG-TERM CARE | DRG: 682 | End: 2022-01-11
Attending: HOSPITALIST | Admitting: HOSPITALIST
Payer: MEDICARE

## 2022-01-06 VITALS
HEART RATE: 80 BPM | OXYGEN SATURATION: 99 % | RESPIRATION RATE: 18 BRPM | HEIGHT: 65 IN | SYSTOLIC BLOOD PRESSURE: 106 MMHG | DIASTOLIC BLOOD PRESSURE: 81 MMHG | WEIGHT: 98.33 LBS

## 2022-01-06 DIAGNOSIS — Z93.0 TRACHEOSTOMY STATUS: Chronic | ICD-10-CM

## 2022-01-06 DIAGNOSIS — Z93.1 GASTROSTOMY STATUS: Chronic | ICD-10-CM

## 2022-01-06 LAB
ALBUMIN SERPL ELPH-MCNC: 1.9 G/DL — LOW (ref 3.3–5)
ALP SERPL-CCNC: 140 U/L — HIGH (ref 30–120)
ALT FLD-CCNC: 104 U/L DA — HIGH (ref 10–60)
ANION GAP SERPL CALC-SCNC: 10 MMOL/L — SIGNIFICANT CHANGE UP (ref 5–17)
APPEARANCE UR: CLEAR — SIGNIFICANT CHANGE UP
AST SERPL-CCNC: 362 U/L — HIGH (ref 10–40)
BACTERIA # UR AUTO: ABNORMAL
BILIRUB SERPL-MCNC: 0.4 MG/DL — SIGNIFICANT CHANGE UP (ref 0.2–1.2)
BILIRUB UR-MCNC: NEGATIVE — SIGNIFICANT CHANGE UP
BUN SERPL-MCNC: 259 MG/DL — HIGH (ref 7–23)
CALCIUM SERPL-MCNC: 8.8 MG/DL — SIGNIFICANT CHANGE UP (ref 8.4–10.5)
CHLORIDE SERPL-SCNC: 82 MMOL/L — LOW (ref 96–108)
CO2 SERPL-SCNC: 27 MMOL/L — SIGNIFICANT CHANGE UP (ref 22–31)
COLOR SPEC: YELLOW — SIGNIFICANT CHANGE UP
CREAT SERPL-MCNC: 4.47 MG/DL — HIGH (ref 0.5–1.3)
DIFF PNL FLD: ABNORMAL
EPI CELLS # UR: SIGNIFICANT CHANGE UP
GLUCOSE SERPL-MCNC: 211 MG/DL — HIGH (ref 70–99)
GLUCOSE UR QL: NEGATIVE MG/DL — SIGNIFICANT CHANGE UP
KETONES UR-MCNC: NEGATIVE — SIGNIFICANT CHANGE UP
LACTATE SERPL-SCNC: 2.6 MMOL/L — HIGH (ref 0.7–2)
LEUKOCYTE ESTERASE UR-ACNC: ABNORMAL
NITRITE UR-MCNC: NEGATIVE — SIGNIFICANT CHANGE UP
NT-PROBNP SERPL-SCNC: 8683 PG/ML — HIGH (ref 0–450)
PH UR: 5 — SIGNIFICANT CHANGE UP (ref 5–8)
POTASSIUM SERPL-MCNC: 6.1 MMOL/L — HIGH (ref 3.5–5.3)
POTASSIUM SERPL-SCNC: 6.1 MMOL/L — HIGH (ref 3.5–5.3)
PROT SERPL-MCNC: 7.3 G/DL — SIGNIFICANT CHANGE UP (ref 6–8.3)
PROT UR-MCNC: 30 MG/DL
RAPID RVP RESULT: SIGNIFICANT CHANGE UP
RBC CASTS # UR COMP ASSIST: ABNORMAL /HPF (ref 0–4)
SARS-COV-2 RNA SPEC QL NAA+PROBE: SIGNIFICANT CHANGE UP
SODIUM SERPL-SCNC: 119 MMOL/L — CRITICAL LOW (ref 135–145)
SP GR SPEC: 1 — LOW (ref 1.01–1.02)
TROPONIN I, HIGH SENSITIVITY RESULT: 109.8 NG/L — HIGH
UROBILINOGEN FLD QL: NEGATIVE MG/DL — SIGNIFICANT CHANGE UP
WBC UR QL: SIGNIFICANT CHANGE UP

## 2022-01-06 PROCEDURE — 74176 CT ABD & PELVIS W/O CONTRAST: CPT | Mod: 26,MA

## 2022-01-06 PROCEDURE — 93010 ELECTROCARDIOGRAM REPORT: CPT

## 2022-01-06 PROCEDURE — 71045 X-RAY EXAM CHEST 1 VIEW: CPT | Mod: 26

## 2022-01-06 PROCEDURE — 99285 EMERGENCY DEPT VISIT HI MDM: CPT

## 2022-01-06 PROCEDURE — 71250 CT THORAX DX C-: CPT | Mod: 26,MA

## 2022-01-06 RX ORDER — DEXTROSE 50 % IN WATER 50 %
50 SYRINGE (ML) INTRAVENOUS ONCE
Refills: 0 | Status: COMPLETED | OUTPATIENT
Start: 2022-01-06 | End: 2022-01-07

## 2022-01-06 RX ORDER — SODIUM POLYSTYRENE SULFONATE 4.1 MEQ/G
45 POWDER, FOR SUSPENSION ORAL ONCE
Refills: 0 | Status: COMPLETED | OUTPATIENT
Start: 2022-01-06 | End: 2022-01-06

## 2022-01-06 RX ORDER — INSULIN HUMAN 100 [IU]/ML
10 INJECTION, SOLUTION SUBCUTANEOUS ONCE
Refills: 0 | Status: DISCONTINUED | OUTPATIENT
Start: 2022-01-06 | End: 2022-01-06

## 2022-01-06 RX ORDER — SODIUM CHLORIDE 9 MG/ML
1000 INJECTION INTRAMUSCULAR; INTRAVENOUS; SUBCUTANEOUS
Refills: 0 | Status: DISCONTINUED | OUTPATIENT
Start: 2022-01-06 | End: 2022-01-07

## 2022-01-06 RX ORDER — INSULIN HUMAN 100 [IU]/ML
5 INJECTION, SOLUTION SUBCUTANEOUS ONCE
Refills: 0 | Status: COMPLETED | OUTPATIENT
Start: 2022-01-06 | End: 2022-01-07

## 2022-01-06 RX ADMIN — SODIUM CHLORIDE 1500 MILLILITER(S): 9 INJECTION INTRAMUSCULAR; INTRAVENOUS; SUBCUTANEOUS at 23:00

## 2022-01-06 RX ADMIN — SODIUM CHLORIDE 50 MILLILITER(S): 9 INJECTION INTRAMUSCULAR; INTRAVENOUS; SUBCUTANEOUS at 23:08

## 2022-01-06 NOTE — ED ADULT NURSE NOTE - NS ED NURSE PATIENT LEFT UNIT TIME
Postpartum Depression evaluation    Corrine Zuluaga is a 25 y.o. female who presents for a follow up after starting Prozac 4 weeks ago. She reports feeling much better. Her baby is doing well. She states she feels much better. She denies excessive crying and thoughts of suicide.      She is currently taking: prozac     Neurologic/Psychiatric  · Mental Status:  · Orientation: grossly oriented to person, place and time  · Mood and Affect: mood normal, affect appropriate    Assessment:  PP depression- resolved on Prozac    Plan:  RTO for postpartum exam 02:53

## 2022-01-06 NOTE — ED PROVIDER NOTE - CLINICAL SUMMARY MEDICAL DECISION MAKING FREE TEXT BOX
Pt with trach from nursing home, reported to have abnormal labs sent ere for eval, will repeat labs and further treatement to be based on results

## 2022-01-06 NOTE — ED PROVIDER NOTE - OBJECTIVE STATEMENT
77 y/o F with a PMHx of DMII, pneumonia, quadriplegia, advanced dementia, HTN, CVA, respiratory failure, constipation, GERD, HTN, aphasic stroke presents to ED sent from Nursing home with abnormal labs, according to EMS, labs done at NH showed low sodium and high potassium, results not sent with pt. EMS states that pt's  requested the transfer. No report of fever, vomiting, or other change in baseline status.

## 2022-01-06 NOTE — CONSULT NOTE ADULT - SUBJECTIVE AND OBJECTIVE BOX
01-06    119<LL>  |  82<L>  |  259<H>  ----------------------------<  211<H>  6.1<H>   |  27  |  4.47<H>    Ca    8.8      06 Jan 2022 21:21    TPro  7.3  /  Alb  1.9<L>  /  TBili  0.4  /  DBili  x   /  AST  362<H>  /  ALT  104<H>  /  AlkPhos  140<H>  01-06      CAPILLARY BLOOD GLUCOSE          Vital Signs Last 24 Hrs  T(C): --  T(F): --  HR: 74 (06 Jan 2022 21:09) (74 - 80)  BP: 106/81 (06 Jan 2022 20:22) (106/81 - 106/81)  BP(mean): --  RR: 18 (06 Jan 2022 20:22) (18 - 18)  SpO2: 100% (06 Jan 2022 21:09) (99% - 100%)           Patient is a 76y Female whom presented to the hospital with manasa    PAST MEDICAL & SURGICAL HISTORY:  Dementia of frontal lobe type    Aphasic stroke    Diabetes mellitus    Respiratory failure    Hypertension    GERD (gastroesophageal reflux disease)    Constipation    Respiratory failure    CVA (cerebral vascular accident)    HTN (hypertension)    DM (diabetes mellitus)    Advanced dementia    COVID-19 virus detected    Quadriplegia    Pneumonia    Type II diabetes mellitus    Hx of appendectomy    Gastrostomy in place    Tracheostomy in place    Tracheostomy tube present    Feeding by G-tube        MEDICATIONS  (STANDING):  ALBUTerol    90 MICROgram(s) HFA Inhaler 2 Puff(s) Inhalation every 6 hours  dextrose 40% Gel 15 Gram(s) Oral once  dextrose 5%. 1000 milliLiter(s) (50 mL/Hr) IV Continuous <Continuous>  dextrose 5%. 1000 milliLiter(s) (100 mL/Hr) IV Continuous <Continuous>  dextrose 50% Injectable 25 Gram(s) IV Push once  dextrose 50% Injectable 12.5 Gram(s) IV Push once  dextrose 50% Injectable 25 Gram(s) IV Push once  glucagon  Injectable 1 milliGRAM(s) IntraMuscular once  heparin   Injectable 5000 Unit(s) SubCutaneous every 8 hours  insulin lispro (ADMELOG) corrective regimen sliding scale   SubCutaneous every 6 hours  lactobacillus acidophilus 1 Tablet(s) Oral daily  LORazepam     Tablet 1 milliGRAM(s) Oral every 4 hours  methocarbamol 500 milliGRAM(s) Oral two times a day  midodrine 2.5 milliGRAM(s) Oral every 8 hours  pantoprazole   Suspension 40 milliGRAM(s) Oral daily  senna 2 Tablet(s) Oral at bedtime  simethicone 80 milliGRAM(s) Chew every 6 hours      Allergies    codeine (Hives)    Intolerances        SOCIAL HISTORY:  Denies ETOh,Smoking,     FAMILY HISTORY:  No pertinent family history in first degree relatives        REVIEW OF SYSTEMS:    unable to obtained a good review system      VITAL:  T(C): , Max: 37.1 (22 @ 08:00)  T(F): , Max: 98.8 (22 @ 08:00)  HR: 82 (22 @ 18:00)  BP: 96/46 (22 @ 18:00)  BP(mean): 61 (22 @ 18:00)  RR: 18 (22 @ 18:00)  SpO2: 100% (22 @ 18:00)  Wt(kg): --    I and O's:     @ 07:  -   @ 07:00  --------------------------------------------------------  IN: 750 mL / OUT: 200 mL / NET: 550 mL     @ 07:  -   @ 19:14  --------------------------------------------------------  IN: 1400 mL / OUT: 980 mL / NET: 420 mL      Height (cm): 165.1 ( @ 20:22)  Weight (kg): 44.6 ( 20:22)  BMI (kg/m2): 16.4 (:22)  BSA (m2): 1.46 ( 20:22)    PHYSICAL EXAM:    Constitutional: NAD  HEENT: conjunctive   clear   Neck:  No JVD  Respiratory: decrease bs b/l   Cardiovascular: S1 and S2  Gastrointestinal: BS+, soft, pos peg   Extremities: No peripheral edema    LABS:                        9.1    6.92  )-----------( 94       ( 2022 06:28 )             29.0         131<L>  |  91<L>  |  223<H>  ----------------------------<  178<H>  3.9   |  30  |  3.41<H>    Ca    8.2<L>      2022 16:18  Phos  4.2       Mg     3.7         TPro  6.2  /  Alb  1.9<L>  /  TBili  0.4  /  DBili  x   /  AST  429<H>  /  ALT  137<H>  /  AlkPhos  135<H>        Urine Studies:  Urinalysis Basic - ( 2022 04:58 )    Color: Yellow / Appearance: Slightly Turbid / S.010 / pH: x  Gluc: x / Ketone: Negative  / Bili: Negative / Urobili: Negative mg/dL   Blood: x / Protein: 30 mg/dL / Nitrite: Negative   Leuk Esterase: Moderate / RBC: 6-10 /HPF / WBC 11-25   Sq Epi: x / Non Sq Epi: Few / Bacteria: Few      Osmolality, Random Urine: 424 mosm/Kg ( @ 13:00)        RADIOLOGY & ADDITIONAL STUDIES:                                        119<LL>  |  82<L>  |  259<H>  ----------------------------<  211<H>  6.1<H>   |  27  |  4.47<H>    Ca    8.8      2022 21:21    TPro  7.3  /  Alb  1.9<L>  /  TBili  0.4  /  DBili  x   /  AST  362<H>  /  ALT  104<H>  /  AlkPhos  140<H>        CAPILLARY BLOOD GLUCOSE          Vital Signs Last 24 Hrs  T(C): --  T(F): --  HR: 74 (2022 21:09) (74 - 80)  BP: 106/81 (2022 20:22) (106/81 - 106/81)  BP(mean): --  RR: 18 (2022 20:22) (18 - 18)  SpO2: 100% (2022 21:09) (99% - 100%)

## 2022-01-06 NOTE — ED ADULT NURSE NOTE - NSIMPLEMENTINTERV_GEN_ALL_ED
Implemented All Universal Safety Interventions:  [place] to call system. Call bell, personal items and telephone within reach. Instruct patient to call for assistance. Room bathroom lighting operational. Non-slip footwear when patient is off stretcher. Physically safe environment: no spills, clutter or unnecessary equipment. Stretcher in lowest position, wheels locked, appropriate side rails in place.

## 2022-01-06 NOTE — ED PROVIDER NOTE - CARE PLAN
1 Principal Discharge DX:	RYAN (acute kidney injury)  Secondary Diagnosis:	Hyponatremia  Secondary Diagnosis:	Hyperkalemia

## 2022-01-06 NOTE — ED CLERICAL - NS ED CLERK NOTE PRE-ARRIVAL INFORMATION; ADDITIONAL PRE-ARRIVAL INFORMATION
This patient is enrolled in the COPD or PNA STARS readmission program and has active care navigation.      -This patient can be followed up by the care navigation team within 24 hours. To arrange close follow-up or to obtain additional clinical information about this patient, please call the contact number above.     -For patients previously on the faculty hospitalist or pulmonary service,      -Consider CDU for management of COPD exacerbation or PNA per guidelines.

## 2022-01-06 NOTE — CONSULT NOTE ADULT - SUBJECTIVE AND OBJECTIVE BOX
BREA BECKHAM    [place]  [facility]    Allergies    codeine (Hives)    Intolerances        PAST MEDICAL & SURGICAL HISTORY:  Dementia of frontal lobe type    Aphasic stroke    Diabetes mellitus    Respiratory failure    Hypertension    GERD (gastroesophageal reflux disease)    Constipation    Respiratory failure    CVA (cerebral vascular accident)    HTN (hypertension)    DM (diabetes mellitus)    Advanced dementia    COVID-19 virus detected    Quadriplegia    Pneumonia    Type II diabetes mellitus    Hx of appendectomy    Gastrostomy in place    Tracheostomy in place    Tracheostomy tube present    Feeding by G-tube        FAMILY HISTORY:  No pertinent family history in first degree relatives        Home Medications:  albuterol 90 mcg/inh inhalation aerosol: 2 puff(s) inhaled every 6 hours (08 Dec 2021 16:51)  Bacid (LAC) oral tablet: 2 tab(s) by gastrostomy tube once a day (08 Dec 2021 16:51)  Carafate 1 g/10 mL oral suspension: 10 milliliter(s) by gastrostomy tube 4 times a day (before meals and at bedtime) for 14 days (Started 6/4/21) (08 Dec 2021 16:51)  chlorhexidine 0.12% mucous membrane liquid: 15 milliliter(s) mucous membrane 2 times a day (08 Dec 2021 16:51)  insulin lispro 100 units/mL injectable solution: injectable 3 times a day ; sliding scale coverage  (14 Dec 2021 10:38)  ipratropium-albuterol 0.5 mg-2.5 mg/3 mL inhalation solution: 3 milliliter(s) inhaled 4 times a day (08 Dec 2021 16:51)  LORazepam 1 mg oral tablet: 1 tab(s) orally every 4 hours (20 Dec 2021 08:32)  methocarbamol 500 mg oral tablet: 1 tab(s) orally 2 times a day (14 Dec 2021 19:18)  pantoprazole 40 mg oral granule, delayed release: 40 milligram(s) by gastrostomy tube 2 times a day (08 Dec 2021 16:51)  polyethylene glycol 3350 oral powder for reconstitution: 17 gram(s) orally every 12 hours (08 Dec 2021 16:51)  ProAir HFA 90 mcg/inh inhalation aerosol: 2 puff(s) inhaled every 6 hours (08 Dec 2021 16:51)  senna 8.6 mg oral tablet: 3 tab(s) by gastrostomy tube once a day (at bedtime) (08 Dec 2021 16:51)  simethicone 80 mg oral tablet, chewable: 1 tab(s) orally every 6 hours (08 Dec 2021 16:51)  Tylenol 325 mg oral tablet: 2 tab(s) by gastrostomy tube once a day; 60 minutes prior to dressing change  (08 Dec 2021 16:51)      MEDICATIONS  (STANDING):  dextrose 50% Injectable 50 milliLiter(s) IV Push once  insulin regular  human recombinant. 10 Unit(s) SubCutaneous once  sodium chloride 0.9%. 1000 milliLiter(s) (50 mL/Hr) IV Continuous <Continuous>  sodium polystyrene sulfonate Enema 45 Gram(s) Rectal Once    MEDICATIONS  (PRN):              Vital Signs Last 24 Hrs  T(C): --  T(F): --  HR: 74 (06 Jan 2022 21:09) (74 - 80)  BP: 106/81 (06 Jan 2022 20:22) (106/81 - 106/81)  BP(mean): --  RR: 18 (06 Jan 2022 20:22) (18 - 18)  SpO2: 100% (06 Jan 2022 21:09) (99% - 100%)        Mode: AC/ CMV (Assist Control/ Continuous Mandatory Ventilation), RR (machine): 18, TV (machine): 400, FiO2: 50, PEEP: 5, ITime: 1, MAP: 12, PIP: 32      LABS:    01-06    119<LL>  |  82<L>  |  259<H>  ----------------------------<  211<H>  6.1<H>   |  27  |  4.47<H>    Ca    8.8      06 Jan 2022 21:21    TPro  7.3  /  Alb  1.9<L>  /  TBili  0.4  /  DBili  x   /  AST  362<H>  /  ALT  104<H>  /  AlkPhos  140<H>  01-06              WBC:      MICROBIOLOGY:  RECENT CULTURES:                  Sodium:  Sodium, Serum: 119 mmol/L (01-06 @ 21:21)      4.47 mg/dL 01-06 @ 21:21      Hemoglobin:      Platelets:     LIVER FUNCTIONS - ( 06 Jan 2022 21:21 )  Alb: 1.9 g/dL / Pro: 7.3 g/dL / ALK PHOS: 140 U/L / ALT: 104 U/L DA / AST: 362 U/L / GGT: x                 RADIOLOGY & ADDITIONAL STUDIES:      MICROBIOLOGY:  RECENT CULTURES:

## 2022-01-06 NOTE — CONSULT NOTE ADULT - ASSESSMENT
CHAPINCITO GUIDRY 75 f [facility] S 1/6/2022   DR ILIANA KEMP     Initial evaluation/Pulmonary Critical Care consultation requested on  1/6/2022 by Dr MCCARTHY  from Dr Sandoval   Patient examined chart reviewed    HOSPITAL ADMISSION   PATIENT CAME  FROM (if information available)      CHAPINCITO GUIDRY 75 [address] [facility] S 1/6/2022   DR ILIANA KEMP     REVIEW OF SYMPTOMS      Able to give (reliable) ROS  NO     PHYSICAL EXAM    HEENT Unremarkable  atraumatic   RESP Fair air entry EXP prolonged    Harsh breath sound Resp distres mild   CARDIAC S1 S2 No S3     NO JVD    ABDOMEN SOFT BS PRESENT NOT DISTENDED No hepatosplenomegaly   PEDAL EDEMA present No calf tenderness  NO rash       PATIENT PRESENTATION.    76 year old female with a history of Pneumonia  HTN, DM, CVA, dementia, chronic respiratory failure s/p trach, PEG, cardiac arrest who presented 1/6/2022 from NH with abnormal labs. She was found to have multiple abn labs . No further history could be obtained from patient.  [facility]ulah was called and advised to admut to [facility]   Pulm crit care consulted 12/8/2021                                                                ______  DOA/CC.  1/6/2022 76 f ho anoxic encephalopathy functional quad trach peg sent from [facility] 1/6/2022 for multiple abn labs ryan   _____                            PROBLEMS POA.  Lacticemia poa 1/6/2022 la 2.6   Vent dependent resp failure pmh  MI poa Tr 1/6/2022 Tr 109   CHF poa bnp 1/6/2022 bnp 8683   Hyponatremia poa 1/6/2022 Na 119   Hyperkalemia poa 1/6/2022 K 6.1   RYAN poa 1/6/2022 Cr 4.4   ELEVATED LFTS poa 1/6/2022        pmh VDRF sp trach poa   pmh PEG  pmh spasms  pmh anoxic encephalopathy    _____                            COVID STATUS. scv2 1/6/2022 (-)  ICU STAY.1/6/2022   GOC.1/6/2022 full code       GENERAL ISSUES                                       ALLERGY.       codeine                      WEIGHT.           1/6/2022 44                         BMI.                    1/6/2022 16           PATIENT DATA   VITALS/PO/IO/VENT/DRIPS.     1/6/2022 80 106/80         ASSESSMENT/RECOMMENDATIONS.    HEMODYNAMICS.   Monitor bp Target MAP 65 (+)  Echo 9/8/2021 ECHO n lvsf mild dd pasp 51   1/6/2022 /80     RESP.   Monitor po Target po 90-95%    OXYGEN REQUIREMENTS.     VENT MANAGEMENT.   Vent dependent resp failure pmh  HOB elevation  Target Pplat 35 (-)  Target PO 90-95%  Target pH 730 (+)    INFECTION.   1/6/2022 check blod c urine c     PAST MICROBIO DATA.  12/11 sputum stenotrophomonas levaq sens  12/11 sputum CRP Pseudomonas  12/11 sputu serratia     Lacticemia poa   la 1/6/2022 la 2.6   a/r   1/6/2022 iv fluids and monitor serially      MI poa   Tr 1/6/2022 Tr 109   Monior  elevated trop in setting of ryan may not mean acs    CHF poa   bnp 1/6/2022 bnp 8683         Hyponatremia poa   Na 1/6/2022 Na 119   IV fluids   May need 3% saline if seizures  monitor    Hyperkalemia poa   K 1/6/2022 K 6.1   1/6/2022 Givenkayexalate in er    RYAN poa   Cr 1/6/2022 Cr 4.4     ELEVATED LFTS poa  LFTS  1/6/2022              RO VTE.  V duplx 12/16 v duplx (-)        OVERALL ISSUES/PLAN.  RYAN IV fluids monitor serially vaughn      TIME SPENT   Over 55 minutes aggregate critical care time spent on encounter; activities included   direct patient care, counseling and/or coordinating care reviewing notes, lab data/ imaging , discussion with multidisciplinary team/ patient  /family and explaining in detail risks, benefits, alternatives  of the recommendations     CHAPINCITO GUIDRY 75 [address] [facility] S 1/6/2022   DR ILIANA KEMP

## 2022-01-06 NOTE — ED PROVIDER NOTE - NS_ ATTENDINGSCRIBEDETAILS _ED_A_ED_FT
Duarte Brown MD - The scribe's documentation has been prepared under my direction and personally reviewed by me in its entirety. I confirm that the note above accurately reflects all work, treatment, procedures, and medical decision making performed by me.

## 2022-01-06 NOTE — CONSULT NOTE ADULT - ASSESSMENT
full consult  full consult to f/u     hyponatremia will check ua , urine osmolality , urine sodium , urine uric acid , serum sodium , serum osmolality , serum uric acid , f/u with hyponatremia work up , f/u with bmp , monitor i and o      hyperkalemia will give Kayexalate        manasa atn   ns 50 cc per hr      HPI:  ***Patient with dementia and is non-verbal.  Unable to provide any HPI or ROS.  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  As per paperwork from [facility], patient was noted to have sodium 124, K 7.1, BUN/Cr 216/4.1.  Also noted with WBC 12.7, hgb 10.9.  Sent here for further workup.  No mention of any fevers or vomiting from [facility] (vital signs at [facility] were /60  HR 79  RR 18, 98%  T 98.1F).  As In the ED, patient's triage vitals were /81 HR 80  RR 18, 99% on vent.  Labs were significant for WBC 7, hgb 8.7.  Initial BMP showed sodium of 119, BUN/Cr of 259/4.47 with K 6.1.  Also notable were elevated LFTs (AST//104), lactate 2.6, troponin 109.8.  Patient was given insulin 5 units, D50, and kayexelate for the hyperkalemia.  Nephrology was consulted and did not think the patient needed dialysis.  Repeat BMP showed improvement of sodium (123), K 5.5, BUN/Cr 252/4.23).  CT C/A/P showed "patchy groundglass opacities and tree-in-bud nodularity likely related to multifocal infection and distal airways impaction similar when compared with prior study, although increased consolidation volume loss in the right lower lobe when compared with prior examination.  Improvement in small left and trace right pleural effusion.  No hydronephrosis.  Undistended bladder, apparent bladder wall thickening, correlate with urinalysis to exclude UTI."   (07 Jan 2022 01:07)          hyponatremia will check ua , urine osmolality , urine sodium , urine uric acid , serum sodium , serum osmolality , serum uric acid , f/u with hyponatremia work up , f/u with bmp , monitor i and o      hyperkalemia will give Kayexalate      ACUTE RENAL FAILURE:   Serum creatinine is  at     , approximating GFR at   ml/min.   There is no progression . No uremic symptoms  No evidence of anemia .  Fluid status stable.  Will continue to avoid nephrotoxic drugs.  Patient remains asymptomatic.   Continue current therapy.  hold  diuretic.  hold   ACE inhibitor.  hold   ARB.  Additional evaluation:   ECG,    echocardiogram,     CXR,  will obtained recent   renal ultrasound to evalaute kidney size and possible stones ,    ryan atn   ns 50 cc per hr

## 2022-01-06 NOTE — ED ADULT NURSE NOTE - NSSUHOSCREENINGYN_ED_ALL_ED
Cancer Nurse Navigator:  Patient contacted myself regarding side effects from anastrozole.  Overall, she is tolerating medication well.  She has noticed some small joint pain and inquires about acupuncture.  I discussed with her that we provide this service here at the [facility] and would recommend trying this.  I will have our PSR's contact the patient to set up an appointment.   All questions were answered. Patient verbalizes understanding of plan of care.   Emotional support and encouragement given.   No - the patient is unable to be screened due to medical condition

## 2022-01-06 NOTE — ED ADULT NURSE REASSESSMENT NOTE - NS ED NURSE REASSESS COMMENT FT1
received report and assumed care of pt at change of shift. pt taken to CT. awaiting results and disposition at this time. tolerating vent setting at this time

## 2022-01-07 DIAGNOSIS — N17.9 ACUTE KIDNEY FAILURE, UNSPECIFIED: ICD-10-CM

## 2022-01-07 LAB
ALBUMIN SERPL ELPH-MCNC: 1.8 G/DL — LOW (ref 3.3–5)
ALBUMIN SERPL ELPH-MCNC: 1.9 G/DL — LOW (ref 3.3–5)
ALP SERPL-CCNC: 128 U/L — HIGH (ref 30–120)
ALP SERPL-CCNC: 135 U/L — HIGH (ref 30–120)
ALT FLD-CCNC: 114 U/L DA — HIGH (ref 10–60)
ALT FLD-CCNC: 137 U/L DA — HIGH (ref 10–60)
ANION GAP SERPL CALC-SCNC: 10 MMOL/L — SIGNIFICANT CHANGE UP (ref 5–17)
ANION GAP SERPL CALC-SCNC: 10 MMOL/L — SIGNIFICANT CHANGE UP (ref 5–17)
ANION GAP SERPL CALC-SCNC: 11 MMOL/L — SIGNIFICANT CHANGE UP (ref 5–17)
ANION GAP SERPL CALC-SCNC: 13 MMOL/L — SIGNIFICANT CHANGE UP (ref 5–17)
APPEARANCE UR: ABNORMAL
AST SERPL-CCNC: 387 U/L — HIGH (ref 10–40)
AST SERPL-CCNC: 429 U/L — HIGH (ref 10–40)
BACTERIA # UR AUTO: ABNORMAL
BASOPHILS # BLD AUTO: 0.01 K/UL — SIGNIFICANT CHANGE UP (ref 0–0.2)
BASOPHILS NFR BLD AUTO: 0.1 % — SIGNIFICANT CHANGE UP (ref 0–2)
BILIRUB SERPL-MCNC: 0.3 MG/DL — SIGNIFICANT CHANGE UP (ref 0.2–1.2)
BILIRUB SERPL-MCNC: 0.4 MG/DL — SIGNIFICANT CHANGE UP (ref 0.2–1.2)
BILIRUB UR-MCNC: NEGATIVE — SIGNIFICANT CHANGE UP
BUN SERPL-MCNC: 212 MG/DL — HIGH (ref 7–23)
BUN SERPL-MCNC: 223 MG/DL — HIGH (ref 7–23)
BUN SERPL-MCNC: 240 MG/DL — HIGH (ref 7–23)
BUN SERPL-MCNC: 252 MG/DL — HIGH (ref 7–23)
CALCIUM SERPL-MCNC: 7.7 MG/DL — LOW (ref 8.4–10.5)
CALCIUM SERPL-MCNC: 8.2 MG/DL — LOW (ref 8.4–10.5)
CALCIUM SERPL-MCNC: 8.2 MG/DL — LOW (ref 8.4–10.5)
CALCIUM SERPL-MCNC: 8.4 MG/DL — SIGNIFICANT CHANGE UP (ref 8.4–10.5)
CHLORIDE SERPL-SCNC: 87 MMOL/L — LOW (ref 96–108)
CHLORIDE SERPL-SCNC: 90 MMOL/L — LOW (ref 96–108)
CHLORIDE SERPL-SCNC: 91 MMOL/L — LOW (ref 96–108)
CHLORIDE SERPL-SCNC: 91 MMOL/L — LOW (ref 96–108)
CK SERPL-CCNC: 8440 U/L — HIGH (ref 26–192)
CK SERPL-CCNC: 8776 U/L — HIGH (ref 26–192)
CO2 SERPL-SCNC: 24 MMOL/L — SIGNIFICANT CHANGE UP (ref 22–31)
CO2 SERPL-SCNC: 25 MMOL/L — SIGNIFICANT CHANGE UP (ref 22–31)
CO2 SERPL-SCNC: 27 MMOL/L — SIGNIFICANT CHANGE UP (ref 22–31)
CO2 SERPL-SCNC: 30 MMOL/L — SIGNIFICANT CHANGE UP (ref 22–31)
COLOR SPEC: YELLOW — SIGNIFICANT CHANGE UP
COMMENT - URINE: SIGNIFICANT CHANGE UP
CREAT SERPL-MCNC: 3.08 MG/DL — HIGH (ref 0.5–1.3)
CREAT SERPL-MCNC: 3.41 MG/DL — HIGH (ref 0.5–1.3)
CREAT SERPL-MCNC: 3.9 MG/DL — HIGH (ref 0.5–1.3)
CREAT SERPL-MCNC: 4.23 MG/DL — HIGH (ref 0.5–1.3)
CRP SERPL-MCNC: 360 MG/L — HIGH
DIFF PNL FLD: ABNORMAL
EOSINOPHIL # BLD AUTO: 0.04 K/UL — SIGNIFICANT CHANGE UP (ref 0–0.5)
EOSINOPHIL NFR BLD AUTO: 0.6 % — SIGNIFICANT CHANGE UP (ref 0–6)
EPI CELLS # UR: SIGNIFICANT CHANGE UP
ERYTHROCYTE [SEDIMENTATION RATE] IN BLOOD: 94 MM/HR — HIGH (ref 0–20)
GLUCOSE SERPL-MCNC: 178 MG/DL — HIGH (ref 70–99)
GLUCOSE SERPL-MCNC: 202 MG/DL — HIGH (ref 70–99)
GLUCOSE SERPL-MCNC: 213 MG/DL — HIGH (ref 70–99)
GLUCOSE SERPL-MCNC: 294 MG/DL — HIGH (ref 70–99)
GLUCOSE UR QL: NEGATIVE MG/DL — SIGNIFICANT CHANGE UP
HCT VFR BLD CALC: 27.6 % — LOW (ref 34.5–45)
HCT VFR BLD CALC: 29 % — LOW (ref 34.5–45)
HGB BLD-MCNC: 8.7 G/DL — LOW (ref 11.5–15.5)
HGB BLD-MCNC: 9.1 G/DL — LOW (ref 11.5–15.5)
IMM GRANULOCYTES NFR BLD AUTO: 1 % — SIGNIFICANT CHANGE UP (ref 0–1.5)
KETONES UR-MCNC: NEGATIVE — SIGNIFICANT CHANGE UP
LACTATE SERPL-SCNC: 2.8 MMOL/L — HIGH (ref 0.7–2)
LEUKOCYTE ESTERASE UR-ACNC: ABNORMAL
LYMPHOCYTES # BLD AUTO: 0.81 K/UL — LOW (ref 1–3.3)
LYMPHOCYTES # BLD AUTO: 11.7 % — LOW (ref 13–44)
MAGNESIUM SERPL-MCNC: 3.7 MG/DL — HIGH (ref 1.6–2.6)
MCHC RBC-ENTMCNC: 25.4 PG — LOW (ref 27–34)
MCHC RBC-ENTMCNC: 25.5 PG — LOW (ref 27–34)
MCHC RBC-ENTMCNC: 31.4 GM/DL — LOW (ref 32–36)
MCHC RBC-ENTMCNC: 31.5 GM/DL — LOW (ref 32–36)
MCV RBC AUTO: 80.5 FL — SIGNIFICANT CHANGE UP (ref 80–100)
MCV RBC AUTO: 81.2 FL — SIGNIFICANT CHANGE UP (ref 80–100)
MONOCYTES # BLD AUTO: 0.46 K/UL — SIGNIFICANT CHANGE UP (ref 0–0.9)
MONOCYTES NFR BLD AUTO: 6.6 % — SIGNIFICANT CHANGE UP (ref 2–14)
NEUTROPHILS # BLD AUTO: 5.53 K/UL — SIGNIFICANT CHANGE UP (ref 1.8–7.4)
NEUTROPHILS NFR BLD AUTO: 80 % — HIGH (ref 43–77)
NITRITE UR-MCNC: NEGATIVE — SIGNIFICANT CHANGE UP
NRBC # BLD: 0 /100 WBCS — SIGNIFICANT CHANGE UP (ref 0–0)
NRBC # BLD: 0 /100 WBCS — SIGNIFICANT CHANGE UP (ref 0–0)
OSMOLALITY SERPL: 360 MOSMOL/KG — HIGH (ref 280–301)
OSMOLALITY UR: 424 MOSM/KG — SIGNIFICANT CHANGE UP (ref 50–1200)
PH UR: 5 — SIGNIFICANT CHANGE UP (ref 5–8)
PHOSPHATE SERPL-MCNC: 4.2 MG/DL — SIGNIFICANT CHANGE UP (ref 2.5–4.5)
PLATELET # BLD AUTO: 121 K/UL — LOW (ref 150–400)
PLATELET # BLD AUTO: 94 K/UL — LOW (ref 150–400)
POTASSIUM SERPL-MCNC: 3.5 MMOL/L — SIGNIFICANT CHANGE UP (ref 3.5–5.3)
POTASSIUM SERPL-MCNC: 3.9 MMOL/L — SIGNIFICANT CHANGE UP (ref 3.5–5.3)
POTASSIUM SERPL-MCNC: 5.5 MMOL/L — HIGH (ref 3.5–5.3)
POTASSIUM SERPL-MCNC: 5.7 MMOL/L — HIGH (ref 3.5–5.3)
POTASSIUM SERPL-SCNC: 3.5 MMOL/L — SIGNIFICANT CHANGE UP (ref 3.5–5.3)
POTASSIUM SERPL-SCNC: 3.9 MMOL/L — SIGNIFICANT CHANGE UP (ref 3.5–5.3)
POTASSIUM SERPL-SCNC: 5.5 MMOL/L — HIGH (ref 3.5–5.3)
POTASSIUM SERPL-SCNC: 5.7 MMOL/L — HIGH (ref 3.5–5.3)
PROCALCITONIN SERPL-MCNC: 22.9 NG/ML — HIGH (ref 0.02–0.1)
PROT SERPL-MCNC: 6.2 G/DL — SIGNIFICANT CHANGE UP (ref 6–8.3)
PROT SERPL-MCNC: 6.6 G/DL — SIGNIFICANT CHANGE UP (ref 6–8.3)
PROT UR-MCNC: 30 MG/DL
RBC # BLD: 3.43 M/UL — LOW (ref 3.8–5.2)
RBC # BLD: 3.57 M/UL — LOW (ref 3.8–5.2)
RBC # FLD: 15.8 % — HIGH (ref 10.3–14.5)
RBC # FLD: 15.8 % — HIGH (ref 10.3–14.5)
RBC CASTS # UR COMP ASSIST: ABNORMAL /HPF (ref 0–4)
SODIUM SERPL-SCNC: 123 MMOL/L — LOW (ref 135–145)
SODIUM SERPL-SCNC: 127 MMOL/L — LOW (ref 135–145)
SODIUM SERPL-SCNC: 128 MMOL/L — LOW (ref 135–145)
SODIUM SERPL-SCNC: 131 MMOL/L — LOW (ref 135–145)
SP GR SPEC: 1.01 — SIGNIFICANT CHANGE UP (ref 1.01–1.02)
TROPONIN I, HIGH SENSITIVITY RESULT: 76.6 NG/L — HIGH
URATE SERPL-MCNC: 10.4 MG/DL — HIGH (ref 2.5–7)
UROBILINOGEN FLD QL: NEGATIVE MG/DL — SIGNIFICANT CHANGE UP
WBC # BLD: 6.92 K/UL — SIGNIFICANT CHANGE UP (ref 3.8–10.5)
WBC # BLD: 7.16 K/UL — SIGNIFICANT CHANGE UP (ref 3.8–10.5)
WBC # FLD AUTO: 6.92 K/UL — SIGNIFICANT CHANGE UP (ref 3.8–10.5)
WBC # FLD AUTO: 7.16 K/UL — SIGNIFICANT CHANGE UP (ref 3.8–10.5)
WBC UR QL: ABNORMAL

## 2022-01-07 PROCEDURE — 99223 1ST HOSP IP/OBS HIGH 75: CPT | Mod: AI

## 2022-01-07 PROCEDURE — 12345: CPT | Mod: NC

## 2022-01-07 RX ORDER — DEXTROSE 50 % IN WATER 50 %
25 SYRINGE (ML) INTRAVENOUS ONCE
Refills: 0 | Status: DISCONTINUED | OUTPATIENT
Start: 2022-01-07 | End: 2022-01-11

## 2022-01-07 RX ORDER — SODIUM CHLORIDE 9 MG/ML
1000 INJECTION, SOLUTION INTRAVENOUS
Refills: 0 | Status: DISCONTINUED | OUTPATIENT
Start: 2022-01-07 | End: 2022-01-11

## 2022-01-07 RX ORDER — INSULIN LISPRO 100/ML
VIAL (ML) SUBCUTANEOUS EVERY 6 HOURS
Refills: 0 | Status: DISCONTINUED | OUTPATIENT
Start: 2022-01-07 | End: 2022-01-11

## 2022-01-07 RX ORDER — SIMETHICONE 80 MG/1
80 TABLET, CHEWABLE ORAL EVERY 6 HOURS
Refills: 0 | Status: DISCONTINUED | OUTPATIENT
Start: 2022-01-07 | End: 2022-01-11

## 2022-01-07 RX ORDER — ALBUTEROL 90 UG/1
2 AEROSOL, METERED ORAL EVERY 6 HOURS
Refills: 0 | Status: DISCONTINUED | OUTPATIENT
Start: 2022-01-07 | End: 2022-01-11

## 2022-01-07 RX ORDER — METHOCARBAMOL 500 MG/1
500 TABLET, FILM COATED ORAL
Refills: 0 | Status: DISCONTINUED | OUTPATIENT
Start: 2022-01-07 | End: 2022-01-11

## 2022-01-07 RX ORDER — ONDANSETRON 8 MG/1
4 TABLET, FILM COATED ORAL EVERY 8 HOURS
Refills: 0 | Status: DISCONTINUED | OUTPATIENT
Start: 2022-01-07 | End: 2022-01-10

## 2022-01-07 RX ORDER — GLUCAGON INJECTION, SOLUTION 0.5 MG/.1ML
1 INJECTION, SOLUTION SUBCUTANEOUS ONCE
Refills: 0 | Status: DISCONTINUED | OUTPATIENT
Start: 2022-01-07 | End: 2022-01-11

## 2022-01-07 RX ORDER — LACTOBACILLUS ACIDOPHILUS 100MM CELL
1 CAPSULE ORAL DAILY
Refills: 0 | Status: DISCONTINUED | OUTPATIENT
Start: 2022-01-07 | End: 2022-01-11

## 2022-01-07 RX ORDER — DEXTROSE 50 % IN WATER 50 %
15 SYRINGE (ML) INTRAVENOUS ONCE
Refills: 0 | Status: DISCONTINUED | OUTPATIENT
Start: 2022-01-07 | End: 2022-01-11

## 2022-01-07 RX ORDER — SODIUM ZIRCONIUM CYCLOSILICATE 10 G/10G
10 POWDER, FOR SUSPENSION ORAL EVERY 8 HOURS
Refills: 0 | Status: DISCONTINUED | OUTPATIENT
Start: 2022-01-07 | End: 2022-01-07

## 2022-01-07 RX ORDER — PANTOPRAZOLE SODIUM 20 MG/1
40 TABLET, DELAYED RELEASE ORAL DAILY
Refills: 0 | Status: DISCONTINUED | OUTPATIENT
Start: 2022-01-07 | End: 2022-01-11

## 2022-01-07 RX ORDER — SODIUM CHLORIDE 9 MG/ML
1500 INJECTION INTRAMUSCULAR; INTRAVENOUS; SUBCUTANEOUS ONCE
Refills: 0 | Status: COMPLETED | OUTPATIENT
Start: 2022-01-07 | End: 2022-01-06

## 2022-01-07 RX ORDER — IPRATROPIUM/ALBUTEROL SULFATE 18-103MCG
3 AEROSOL WITH ADAPTER (GRAM) INHALATION EVERY 6 HOURS
Refills: 0 | Status: DISCONTINUED | OUTPATIENT
Start: 2022-01-07 | End: 2022-01-07

## 2022-01-07 RX ORDER — ALBUTEROL 90 UG/1
2 AEROSOL, METERED ORAL EVERY 6 HOURS
Refills: 0 | Status: COMPLETED | OUTPATIENT
Start: 2022-01-07 | End: 2022-12-06

## 2022-01-07 RX ORDER — SENNA PLUS 8.6 MG/1
2 TABLET ORAL AT BEDTIME
Refills: 0 | Status: DISCONTINUED | OUTPATIENT
Start: 2022-01-07 | End: 2022-01-11

## 2022-01-07 RX ORDER — MIDODRINE HYDROCHLORIDE 2.5 MG/1
2.5 TABLET ORAL EVERY 8 HOURS
Refills: 0 | Status: DISCONTINUED | OUTPATIENT
Start: 2022-01-07 | End: 2022-01-10

## 2022-01-07 RX ORDER — ACETAMINOPHEN 500 MG
650 TABLET ORAL EVERY 6 HOURS
Refills: 0 | Status: DISCONTINUED | OUTPATIENT
Start: 2022-01-07 | End: 2022-01-11

## 2022-01-07 RX ORDER — HEPARIN SODIUM 5000 [USP'U]/ML
5000 INJECTION INTRAVENOUS; SUBCUTANEOUS EVERY 8 HOURS
Refills: 0 | Status: DISCONTINUED | OUTPATIENT
Start: 2022-01-07 | End: 2022-01-11

## 2022-01-07 RX ORDER — LANOLIN ALCOHOL/MO/W.PET/CERES
3 CREAM (GRAM) TOPICAL AT BEDTIME
Refills: 0 | Status: DISCONTINUED | OUTPATIENT
Start: 2022-01-07 | End: 2022-01-11

## 2022-01-07 RX ORDER — DEXTROSE 50 % IN WATER 50 %
12.5 SYRINGE (ML) INTRAVENOUS ONCE
Refills: 0 | Status: DISCONTINUED | OUTPATIENT
Start: 2022-01-07 | End: 2022-01-11

## 2022-01-07 RX ORDER — SODIUM CHLORIDE 9 MG/ML
500 INJECTION, SOLUTION INTRAVENOUS
Refills: 0 | Status: DISCONTINUED | OUTPATIENT
Start: 2022-01-07 | End: 2022-01-08

## 2022-01-07 RX ORDER — POLYETHYLENE GLYCOL 3350 17 G/17G
17 POWDER, FOR SOLUTION ORAL EVERY 12 HOURS
Refills: 0 | Status: DISCONTINUED | OUTPATIENT
Start: 2022-01-07 | End: 2022-01-11

## 2022-01-07 RX ADMIN — INSULIN HUMAN 5 UNIT(S): 100 INJECTION, SOLUTION SUBCUTANEOUS at 00:46

## 2022-01-07 RX ADMIN — SODIUM ZIRCONIUM CYCLOSILICATE 10 GRAM(S): 10 POWDER, FOR SUSPENSION ORAL at 03:27

## 2022-01-07 RX ADMIN — Medication 1 MILLIGRAM(S): at 17:19

## 2022-01-07 RX ADMIN — Medication 1 MILLIGRAM(S): at 03:27

## 2022-01-07 RX ADMIN — MIDODRINE HYDROCHLORIDE 2.5 MILLIGRAM(S): 2.5 TABLET ORAL at 22:00

## 2022-01-07 RX ADMIN — Medication 1 TABLET(S): at 11:50

## 2022-01-07 RX ADMIN — SODIUM CHLORIDE 500 MILLILITER(S): 9 INJECTION, SOLUTION INTRAVENOUS at 19:51

## 2022-01-07 RX ADMIN — Medication 1 MILLIGRAM(S): at 14:20

## 2022-01-07 RX ADMIN — SIMETHICONE 80 MILLIGRAM(S): 80 TABLET, CHEWABLE ORAL at 11:49

## 2022-01-07 RX ADMIN — Medication 2: at 11:52

## 2022-01-07 RX ADMIN — Medication 4: at 06:09

## 2022-01-07 RX ADMIN — MIDODRINE HYDROCHLORIDE 2.5 MILLIGRAM(S): 2.5 TABLET ORAL at 14:20

## 2022-01-07 RX ADMIN — Medication 50 MILLILITER(S): at 00:45

## 2022-01-07 RX ADMIN — SIMETHICONE 80 MILLIGRAM(S): 80 TABLET, CHEWABLE ORAL at 23:51

## 2022-01-07 RX ADMIN — HEPARIN SODIUM 5000 UNIT(S): 5000 INJECTION INTRAVENOUS; SUBCUTANEOUS at 22:00

## 2022-01-07 RX ADMIN — SENNA PLUS 2 TABLET(S): 8.6 TABLET ORAL at 22:00

## 2022-01-07 RX ADMIN — METHOCARBAMOL 500 MILLIGRAM(S): 500 TABLET, FILM COATED ORAL at 17:19

## 2022-01-07 RX ADMIN — PANTOPRAZOLE SODIUM 40 MILLIGRAM(S): 20 TABLET, DELAYED RELEASE ORAL at 11:49

## 2022-01-07 RX ADMIN — HEPARIN SODIUM 5000 UNIT(S): 5000 INJECTION INTRAVENOUS; SUBCUTANEOUS at 06:09

## 2022-01-07 RX ADMIN — HEPARIN SODIUM 5000 UNIT(S): 5000 INJECTION INTRAVENOUS; SUBCUTANEOUS at 14:20

## 2022-01-07 RX ADMIN — Medication 1 MILLIGRAM(S): at 10:19

## 2022-01-07 RX ADMIN — Medication 1 MILLIGRAM(S): at 06:12

## 2022-01-07 RX ADMIN — SODIUM POLYSTYRENE SULFONATE 45 GRAM(S): 4.1 POWDER, FOR SUSPENSION ORAL at 00:45

## 2022-01-07 RX ADMIN — SIMETHICONE 80 MILLIGRAM(S): 80 TABLET, CHEWABLE ORAL at 17:19

## 2022-01-07 RX ADMIN — Medication 1 MILLIGRAM(S): at 22:00

## 2022-01-07 RX ADMIN — METHOCARBAMOL 500 MILLIGRAM(S): 500 TABLET, FILM COATED ORAL at 06:25

## 2022-01-07 RX ADMIN — Medication 6: at 23:50

## 2022-01-07 RX ADMIN — SIMETHICONE 80 MILLIGRAM(S): 80 TABLET, CHEWABLE ORAL at 06:09

## 2022-01-07 RX ADMIN — Medication 2: at 17:19

## 2022-01-07 RX ADMIN — SODIUM ZIRCONIUM CYCLOSILICATE 10 GRAM(S): 10 POWDER, FOR SUSPENSION ORAL at 10:20

## 2022-01-07 NOTE — CONSULT NOTE ADULT - ASSESSMENT
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 was noted to have sodium 124, K 7.1, BUN/Cr 216/4.1.  Also noted with WBC 12.7, hgb 10.9.      CT C/A/P showed "patchy groundglass opacities and tree-in-bud nodularity likely related to multifocal infection and distal airways impaction similar when compared with prior study, although increased consolidation volume loss in the right lower lobe when compared with prior examination.  Improvement in small left and trace right pleural effusion.  No hydronephrosis.  Undistended bladder, apparent bladder wall thickening, correlate with urinalysis to exclude UTI."      RECOMMENDATIONS  Lung exam unremarkable, no purulent secretions, UA relatively bland, no leukocytosis on our labs  recommend observation off abx and focus on electrolyte disturbances. Even if growth noted in urine culture would not treat unless suggestion of acute infection such as fever, leukocytosis.    Thank you for consulting us and involving us in the management of this most interesting and challenging case.  We will follow along in the care of this patient. Please call us at [phone] or text me directly on my cell# at [phone] with any concerns.

## 2022-01-07 NOTE — H&P ADULT - NSHPLABSRESULTS_GEN_ALL_CORE
LABS:                        8.7<L>  7.16  )-----------( 121<L>    ( 2022 00:07 )             27.6<L>    123<L>  |  87<L>  |  252<H>  ----------------------------<  202<H>    2022 00:07  5.5<H>   |  25     |  4.23<H>      119<LL>  |  82<L>  |  259<H>  ----------------------------<  211<H>    2022 21:21  6.1<H>   |  27     |  4.47<H>        Ca 8.4           2022 00:07  Ca 8.8           2022 21:21        TPro  6.6    /  Alb  1.8<L>  /  TBili  0.3    /  DBili  x      /  AST  387<H>  /  ALT  114<H>  /  AlkPhos  128<H>  2022 00:07  TPro  7.3    /  Alb  1.9<L>  /  TBili  0.4    /  DBili  x      /  AST  362<H>  /  ALT  104<H>  /  AlkPhos  140<H>  2022 21:21    Urinalysis Basic - ( 2022 23:14 )    Color: Yellow / Appearance: Clear / S.005 / pH: x  Gluc: x / Ketone: Negative  / Bili: Negative / Urobili: Negative mg/dL   Blood: x / Protein: 30 mg/dL / Nitrite: Negative   Leuk Esterase: Moderate / RBC: 6-10 /HPF / WBC 3-5   Sq Epi: x / Non Sq Epi: Few / Bacteria: Few    Troponin trend:  Troponin I, High Sensitivity Result: 109.8 ng/L (22 @ 21:21)    Lactate, Blood: 2.6 mmol/L (22 @ 21:21)    EKG:    Radiology: LABS:                        8.7<L>  7.16  )-----------( 121<L>    ( 2022 00:07 )             27.6<L>    123<L>  |  87<L>  |  252<H>  ----------------------------<  202<H>    2022 00:07  5.5<H>   |  25     |  4.23<H>      119<LL>  |  82<L>  |  259<H>  ----------------------------<  211<H>    2022 21:21  6.1<H>   |  27     |  4.47<H>        Ca 8.4           2022 00:07  Ca 8.8           2022 21:21        TPro  6.6    /  Alb  1.8<L>  /  TBili  0.3    /  DBili  x      /  AST  387<H>  /  ALT  114<H>  /  AlkPhos  128<H>  2022 00:07  TPro  7.3    /  Alb  1.9<L>  /  TBili  0.4    /  DBili  x      /  AST  362<H>  /  ALT  104<H>  /  AlkPhos  140<H>  2022 21:21    Urinalysis Basic - ( 2022 23:14 )    Color: Yellow / Appearance: Clear / S.005 / pH: x  Gluc: x / Ketone: Negative  / Bili: Negative / Urobili: Negative mg/dL   Blood: x / Protein: 30 mg/dL / Nitrite: Negative   Leuk Esterase: Moderate / RBC: 6-10 /HPF / WBC 3-5   Sq Epi: x / Non Sq Epi: Few / Bacteria: Few    Troponin trend:  Troponin I, High Sensitivity Result: 109.8 ng/L (22 @ 21:21)    Lactate, Blood: 2.6 mmol/L (22 @ 21:21)    EKG:   NSR (computer reports JOHNY in inferolateral leads but looks like J-point elevations, very similar to previous EKG on 2021)  Radiology:  < from: CT Abdomen and Pelvis No Cont (22 @ 23:39) >      IMPRESSION:  Patchy groundglass opacities and tree-in-bud nodularity likely related to   multifocal infection and distal airways impaction, similar when compared   with the prior study, although increased consolidation volume loss in the   right lower lobe when compared with the prior examination. Improvement in   small left and trace right pleural effusions.    No hydronephrosis. Underdistended bladder, apparent bladder wall   thickening, correlate with urinalysis to exclude UTI.

## 2022-01-07 NOTE — PROGRESS NOTE ADULT - ASSESSMENT
PARASHARAMI BREA 75 f [facility] S 1/6/2022   DR ILIANA KEMP     REVIEW OF SYMPTOMS      Able to give (reliable) ROS  NO     PHYSICAL EXAM    HEENT Unremarkable  atraumatic   RESP Fair air entry EXP prolonged    Harsh breath sound Resp distres mild   CARDIAC S1 S2 No S3     NO JVD    ABDOMEN SOFT BS PRESENT NOT DISTENDED No hepatosplenomegaly   PEDAL EDEMA present No calf tenderness  NO rash     ______  DOA/CC.  1/6/2022 76 f ho anoxic encephalopathy functional quad trach peg sent from [facility] 1/6/2022 for multiple abn labs ryan   _____                            PROBLEMS POA.  Lacticemia poa 1/6/2022 la 2.6   VDRF Vent dependent resp failure pmh  MI poa Tr 1/6/2022 Tr 109   CHF poa bnp 1/6/2022 bnp 8683   Hyponatremia poa 1/6/2022 Na 119   Hyperkalemia poa 1/6/2022 K 6.1   RYAN poa 1/6/2022 Cr 4.4   ELEVATED LFTS poa 1/6/2022           Rhabdo poa     pmh VDRF sp trach poa   pmh PEG  pmh spasms  pmh anoxic encephalopathy    _____                            COVID STATUS. scv2 1/6/2022 (-)  ICU STAY.1/6/2022   GOC.1/6/2022 full code         BEST PRACTICE ISSUES.                                                  HEAD OF BED ELEVATION. Yes  DVT PROPHYLAXIS.    1/7/2022 hpsc   SQUIRES PROPHYLAXIS.        1/7/2022 protonix                                                                                 DIET.          1/7/2022 glucerna 1.5 1200    INFECTION PROPHYLAXIS.    SPEECH SWALLOW RECOMMENDATIONS.     PATIENT DATA   VITALS/PO/IO/VENT/DRIPS.    1/7/2022 afeb 85 88/43    1/7/2022 18/400/5/.5   1/7/2022 u 200     ASSESSMENT/RECOMMENDATIONS.    HEMODYNAMICS.   Monitor bp Target MAP 65 (+)  Echo 9/8/2021 ECHO n lvsf mild dd pasp 51   1/6/2022 /80   1/7/2022 88/43   1/7/2022 midodrine 2.5 x 3     RESP.   Monitor po Target po 90-95%    OXYGEN REQUIREMENTS.   O2 1/7/2022 O2 0.5     VENT MANAGEMENT.   Vent dependent resp failure pmh  HOB elevation  Target Pplat 35 (-)  Target PO 90-95%  Target pH 730 (+)    INFECTION.   1/6/2022 check blod c urine c   SOURCE.  W. 1/7/2022 w 6.9   PROCALC.   UA.   IMAGING.   1/7/2022 ct cap   patchy ggo and tib nsc   Increased consokidatn rll cw 12/8/2021   CULTURES. pending  A/R.   1/7/2022 ID consulted    PAST MICROBIO DATA.  12/11 sputum stenotrophomonas levaq sens  12/11 sputum CRP Pseudomonas  12/11 sputu serratia     Lacticemia poa   la 1/6-1/7/2022 la 2.6 - 2.8   a/r   1/6/2022 iv fluids and monitor serially    COPD   1/7 albuterol       MI poa   Tr 1/6-1/7/2022 Tr 109 - 76   Monior  elevated trop in setting of ryan may not mean acs    CHF poa   bnp 1/6/2022 bnp 8683         ANEMIA.   Hb 1/7/2022 Hb 9.1     ELEVATED LFTS poa  LFTS  1/6-1/7/2022    - 135   - 429   - 137     RO VTE.  V duplx 12/16 v duplx (-)        Hyponatremia poa   Na 1/6-1/7/2022 Na 119 - 127   IV fluids 1/6 NS 50     RHABDO   CK 1/7/2022 ck 8440  ua 1/7/2022 w 11-25 few bact r 6-10 blod lg     Hyperkalemia poa   K 1/6-1/7/2022 K 6.1 - 5.7   1/6/2022 Givenkayexalate in er  1/7/2022 Na zirconium 10g.3 x 3 do     RYAN poa   Cr 1/6-1/7/2022 Cr 4.4 - 3.9   1/7/2022 oliguric   1/7/2022 renal was called last night           OVERALL ISSUES/PLAN.  HYPONATREMIA Avoid rapid flux   RYAN IV fluids monitor serially vaughn  HYPORTENSION Midodrine   SEPSIS ID consulted         TIME SPENT   Over 36 minutes aggregate critical care time spent on encounter; activities included   direct patient care, counseling and/or coordinating care reviewing notes, lab data/ imaging , discussion with multidisciplinary team/ patient  /family and explaining in detail risks, benefits, alternatives  of the recommendations     CHAPINCITO GUIDRY 75 [address] [facility] S 1/6/2022   DR ILIANA KEMP

## 2022-01-07 NOTE — CONSULT NOTE ADULT - ASSESSMENT
The patient is a 76 year old female with a history of HTN, DM, CVA, dementia, chronic respiratory failure s/p trach, PEG, cardiac arrest, anemia who presents with abnormal labs including hyponatremia, RYAN.    Plan:  - ECG with no evidence of ischemia or infarction  - Echo 9/21 with normal LV systolic function, mod pulm HTN  - Troponin mildly elevated at 109 and trended down, likely retention of enzymes from RYAN. No need to trend further.  - Hyponatremia and RYAN slowly improving with IV fluids  - Renal follow-up  - Mechanical ventilation  - ICU care

## 2022-01-07 NOTE — PATIENT PROFILE ADULT - FALL HARM RISK - HARM RISK INTERVENTIONS
Assistance with ambulation/Assistance OOB with selected safe patient handling equipment/Communicate Risk of Fall with Harm to all staff/Discuss with provider need for PT consult/Monitor gait and stability/Reinforce activity limits and safety measures with patient and family/Tailored Fall Risk Interventions/Visual Cue: Yellow wristband and red socks/Bed in lowest position, wheels locked, appropriate side rails in place/Call bell, personal items and telephone in reach/Instruct patient to call for assistance before getting out of bed or chair/Non-slip footwear when patient is out of bed/[place] to call system/Physically safe environment - no spills, clutter or unnecessary equipment/Purposeful Proactive Rounding/Room/bathroom lighting operational, light cord in reach

## 2022-01-07 NOTE — CONSULT NOTE ADULT - SUBJECTIVE AND OBJECTIVE BOX
[facility] DIVISION of INFECTIOUS DISEASE  Arturo Olivas MD PhD, Haylee Umanzor MD, Andressa Brantley MD, Billy Valenzuela MD, Emil Medellin MD  and providing coverage with Alexa Canas MD and Eusebio Chairez MD  Providing Infectious Disease Consultations at [facility], [facility]'s    Office# [phone] to schedule follow up appointments  Answering Service for urgent calls or New Consults [phone]  Cell# to text for urgent issues Arturo Olivas [phone]     HPI:  ***Patient with dementia and is non-verbal.  Unable to provide any HPI or ROS.  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  As per paperwork from [facility], patient was noted to have sodium 124, K 7.1, BUN/Cr 216/4.1.  Also noted with WBC 12.7, hgb 10.9.  Sent here for further workup.  No mention of any fevers or vomiting from [facility] (vital signs at [facility] were /60  HR 79  RR 18, 98%  T 98.1F).  As In the ED, patient's triage vitals were /81 HR 80  RR 18, 99% on vent.  Labs were significant for WBC 7, hgb 8.7.  Initial BMP showed sodium of 119, BUN/Cr of 259/4.47 with K 6.1.  Also notable were elevated LFTs (AST//104), lactate 2.6, troponin 109.8.  Patient was given insulin 5 units, D50, and kayexelate for the hyperkalemia.  Nephrology was consulted and did not think the patient needed dialysis.  Repeat BMP showed improvement of sodium (123), K 5.5, BUN/Cr 252/4.23).  CT C/A/P showed "patchy groundglass opacities and tree-in-bud nodularity likely related to multifocal infection and distal airways impaction similar when compared with prior study, although increased consolidation volume loss in the right lower lobe when compared with prior examination.  Improvement in small left and trace right pleural effusion.  No hydronephrosis.  Undistended bladder, apparent bladder wall thickening, correlate with urinalysis to exclude UTI."         PAST MEDICAL & SURGICAL HISTORY:  Dementia of frontal lobe type  Aphasic stroke  Respiratory failure  Hypertension  GERD (gastroesophageal reflux disease)  Constipation  CVA (cerebral vascular accident)  HTN (hypertension)  DM (diabetes mellitus)  COVID-19 virus detected  Quadriplegia  recurrent Pneumonia      Hx of appendectomy  Gastrostomy in place  Tracheostomy in place  Tracheostomy tube present  Feeding by G-tube    Antimicrobials      Immunological      Other  acetaminophen     Tablet .. 650 milliGRAM(s) Oral every 6 hours PRN  ALBUTerol    90 MICROgram(s) HFA Inhaler 2 Puff(s) Inhalation every 6 hours  aluminum hydroxide/magnesium hydroxide/simethicone Suspension 30 milliLiter(s) Oral every 4 hours PRN  dextrose 40% Gel 15 Gram(s) Oral once  dextrose 5%. 1000 milliLiter(s) IV Continuous <Continuous>  dextrose 5%. 1000 milliLiter(s) IV Continuous <Continuous>  dextrose 50% Injectable 25 Gram(s) IV Push once  dextrose 50% Injectable 12.5 Gram(s) IV Push once  dextrose 50% Injectable 25 Gram(s) IV Push once  glucagon  Injectable 1 milliGRAM(s) IntraMuscular once  heparin   Injectable 5000 Unit(s) SubCutaneous every 8 hours  insulin lispro (ADMELOG) corrective regimen sliding scale   SubCutaneous every 6 hours  lactobacillus acidophilus 1 Tablet(s) Oral daily  LORazepam     Tablet 1 milliGRAM(s) Oral every 4 hours  melatonin 3 milliGRAM(s) Oral at bedtime PRN  methocarbamol 500 milliGRAM(s) Oral two times a day  midodrine 2.5 milliGRAM(s) Oral every 8 hours  ondansetron Injectable 4 milliGRAM(s) IV Push every 8 hours PRN  pantoprazole   Suspension 40 milliGRAM(s) Oral daily  polyethylene glycol 3350 17 Gram(s) Oral every 12 hours PRN  senna 2 Tablet(s) Oral at bedtime  simethicone 80 milliGRAM(s) Chew every 6 hours  sodium chloride 0.9%. 1000 milliLiter(s) IV Continuous <Continuous>  sodium zirconium cyclosilicate 10 Gram(s) Oral every 8 hours      Allergies    codeine (Hives)    Intolerances        SOCIAL HISTORY:  Trach, vent from [facility] (2022 01:07)      FAMILY HISTORY:  No pertinent family history in first degree relatives        ROS:    limited by nonverbal status    Vital Signs Last 24 Hrs  T(C): 37.1 (2022 08:00), Max: 37.1 (2022 08:00)  T(F): 98.8 (2022 08:00), Max: 98.8 (2022 08:00)  HR: 77 (2022 08:00) (74 - 87)  BP: 88/43 (2022 08:00) (86/41 - 124/57)  BP(mean): 57 (2022 08:00) (54 - 67)  RR: 18 (2022 08:00) (16 - 18)  SpO2: 100% (2022 08:00) (99% - 100%)    PE:  WDWN in no distress  HEENT:  NC, PERRL, sclerae anicteric, conjunctivae clear, EOMI.  Sinuses nontender, no nasal exudate.  No buccal or pharyngeal lesions, erythema or exudate  Neck:  Supple, no adenopathy, intubated via trach  Lungs:  No accessory muscle use, bilaterally clear to auscultation  Cor:  distant  Abd:  Symmetric, normoactive BS.  Soft, nontender, no masses, guarding or rebound.  Liver and spleen not enlarged  Extrem:  No cyanosis or edema  Skin:  No rashes.  Neuro: nonverbal    Mode: AC/ CMV, RR (machine): 18, TV (machine): 400, FiO2: 50, PEEP: 5, ITime: 1, MAP: 11, PIP: 28    LABS:                        9.1    6.92  )-----------( 94       ( 2022 06:28 )             29.0       WBC Count: 6.92 K/uL (22 @ 06:28)  WBC Count: 7.16 K/uL (22 @ 00:07)          127<L>  |  90<L>  |  240<H>  ----------------------------<  213<H>  5.7<H>   |  24  |  3.90<H>    Ca    8.2<L>      2022 06:28  Phos  4.2       Mg     3.7         TPro  6.2  /  Alb  1.9<L>  /  TBili  0.4  /  DBili  x   /  AST  429<H>  /  ALT  137<H>  /  AlkPhos  135<H>        Creatinine, Serum: 3.90 mg/dL (22 @ 06:28)  Creatinine, Serum: 4.23 mg/dL (22 @ 00:07)  Creatinine, Serum: 4.47 mg/dL (22 @ 21:21)      Urinalysis Basic - ( 2022 04:58 )    Color: Yellow / Appearance: Slightly Turbid / S.010 / pH: x  Gluc: x / Ketone: Negative  / Bili: Negative / Urobili: Negative mg/dL   Blood: x / Protein: 30 mg/dL / Nitrite: Negative   Leuk Esterase: Moderate / RBC: 6-10 /HPF / WBC 11-25   Sq Epi: x / Non Sq Epi: Few / Bacteria: Few      MICROBIOLOGY:      RADIOLOGY & ADDITIONAL STUDIES:    --

## 2022-01-07 NOTE — CONSULT NOTE ADULT - SUBJECTIVE AND OBJECTIVE BOX
Gris is a 76 year-old woman who was evaluated for elevated liver enzymes. She has advanced dementia and is unable to provide any history. Information was obtained via review of the medical chart. She is known to our service from an admission last month. Relevant history includes history of cardiac arrest, history of subarachnoid hemorrhage, history of CVA, COPD with chronic respiratory failure s/p trach, dysphagia s/p PEG, hypertension, diabetes mellitus type II on insulin, GERD, and a recent admission for RYAN, hyponatremia, and aspiration pneumonia She was readmitted yesterday with severe electrolyte derangements hyponatremia, hyperkalemia, and RYAN. GI is consulted for elevated liver enzymes, transaminases. She has no documented history of liver disease and liver enzymes were normal on her recent admission. Abdominal imaging on this admission notes normal liver and normal pancreas with gallstones but no evidence of cholecystitis.    PAST MEDICAL & SURGICAL HISTORY:  Dementia of frontal lobe type  Aphasic stroke  Respiratory failure  GERD (gastroesophageal reflux disease)  Constipation  Respiratory failure  CVA (cerebral vascular accident)  HTN (hypertension)  DM (diabetes mellitus)  COVID-19 virus  Quadriplegia  Pneumonia  Hx of appendectomy  Gastrostomy in place  Tracheostomy in place    REVIEW OF SYSTEMS:    Unable to obtain    MEDICATIONS  (STANDING):  ALBUTerol    90 MICROgram(s) HFA Inhaler 2 Puff(s) Inhalation every 6 hours  dextrose 40% Gel 15 Gram(s) Oral once  dextrose 5%. 1000 milliLiter(s) (50 mL/Hr) IV Continuous <Continuous>  dextrose 5%. 1000 milliLiter(s) (100 mL/Hr) IV Continuous <Continuous>  dextrose 50% Injectable 25 Gram(s) IV Push once  dextrose 50% Injectable 12.5 Gram(s) IV Push once  dextrose 50% Injectable 25 Gram(s) IV Push once  glucagon  Injectable 1 milliGRAM(s) IntraMuscular once  heparin   Injectable 5000 Unit(s) SubCutaneous every 8 hours  insulin lispro (ADMELOG) corrective regimen sliding scale   SubCutaneous every 6 hours  lactobacillus acidophilus 1 Tablet(s) Oral daily  LORazepam     Tablet 1 milliGRAM(s) Oral every 4 hours  methocarbamol 500 milliGRAM(s) Oral two times a day  pantoprazole   Suspension 40 milliGRAM(s) Oral daily  senna 2 Tablet(s) Oral at bedtime  simethicone 80 milliGRAM(s) Chew every 6 hours  sodium chloride 0.9%. 1000 milliLiter(s) (50 mL/Hr) IV Continuous <Continuous>  sodium zirconium cyclosilicate 10 Gram(s) Oral every 8 hours    MEDICATIONS  (PRN):  acetaminophen     Tablet .. 650 milliGRAM(s) Oral every 6 hours PRN Mild Pain (1 - 3)  aluminum hydroxide/magnesium hydroxide/simethicone Suspension 30 milliLiter(s) Oral every 4 hours PRN Dyspepsia  melatonin 3 milliGRAM(s) Oral at bedtime PRN Insomnia  ondansetron Injectable 4 milliGRAM(s) IV Push every 8 hours PRN Nausea and/or Vomiting  polyethylene glycol 3350 17 Gram(s) Oral every 12 hours PRN Constipation    Allergies  codeine (Hives)    SOCIAL HISTORY: Unable to obtain    FAMILY HISTORY: Unable to obtain    Vital Signs Last 24 Hrs  T(C): 37.1 (2022 08:00), Max: 37.1 (2022 08:00)  T(F): 98.8 (2022 08:00), Max: 98.8 (2022 08:00)  HR: 77 (2022 08:00) (74 - 87)  BP: 88/43 (2022 08:00) (86/41 - 124/57)  BP(mean): 57 (2022 08:00) (54 - 67)  RR: 18 (2022 08:00) (16 - 18)  SpO2: 100% (2022 08:00) (99% - 100%)    PHYSICAL EXAM:    GENERAL: Resting, comfortable, NAD  HEAD:  Atraumatic, Normocephalic  EYES: Anicteric sclera  ENMT: Moist mucous membranes, Good dentition, No thrush  HEART: Regular on monitor  ABDOMEN: Soft, Non-tender, mildly distended; + PEG  EXTREMITIES:  No edema  SKIN: No jaundice  NERVOUS SYSTEM:  Non-verbal    LABS:                        9.1    6.92  )-----------( 94       ( 2022 06:28 )             29.0     01-07    127<L>  |  90<L>  |  240<H>  ----------------------------<  213<H>  5.7<H>   |  24  |  3.90<H>    Ca    8.2<L>      2022 06:28  Phos  4.2       Mg     3.7         TPro  6.2  /  Alb  1.9<L>  /  TBili  0.4  /  DBili  x   /  AST  429<H>  /  ALT  137<H>  /  AlkPhos  135<H>      Urinalysis Basic - ( 2022 04:58 )    Color: Yellow / Appearance: Slightly Turbid / S.010 / pH: x  Gluc: x / Ketone: Negative  / Bili: Negative / Urobili: Negative mg/dL   Blood: x / Protein: 30 mg/dL / Nitrite: Negative   Leuk Esterase: Moderate / RBC: 6-10 /HPF / WBC 11-25   Sq Epi: x / Non Sq Epi: Few / Bacteria: Few

## 2022-01-07 NOTE — PROVIDER CONTACT NOTE (EICU) - ASSESSMENT
Tele-evaluation precludes physical exam.  Pertinent physical exam findings as per verbal communication by bedside provider are as following.   GEN - Non-verbal in NAD on Vent; NECK - Trach in place PULM: Clear to auscultation bilaterally CVS: Regular rate and rhythm, no murmurs, rubs, or gallops; ABD: Soft, nondistended, nontender, normoactive bowel sounds; EXT: No edema, Upper ext contracted  119<LL>  |  82<L>  |  259<H>  ----------------------------<  211<H>  6.1<H>   |  27  |  4.47<H>

## 2022-01-07 NOTE — H&P ADULT - ASSESSMENT
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       Acute renal failure - possibly 2/2 ATN, no hydronephrosis seen on CT  - admitted to SPCU 2/2 vent status  - avoid nephrotoxic meds as much as possible  - will be getting fluids  - repeat BMP  - as per nephrology -> dialysis not needed at this time  - CC consulted     Hyponatremia    - as per nephrology -> check UA, urine and serum osms, urine sodium, urine and serum uric acid  - will be on NS 50cc/hr as per nephrology  - avoid fast correction    Hyperkalemia  - given kayexelate 45gms   - will be started on lokelma 10mg q8h  - monitor with BMP    Elevated LFTs - unclear etiology, possibly from dehydration?, CT does not point to any specific etiology  - monitor LFTs  - consider GI consult    Elevated troponins - likely 2/2 renal failure  - will repeat  - monitor telemetry  - cardiology consult    Chronic resp failure/COPD  - maintain vent settings, goal of SaO2 >95%  - cont with nebs  - pulmonary to follow    Elevated lactate  - would not start on any abx at this time (no leukocytosis, no fevers, CT not that impressive compared to previous CT)  - will check inflammatory markers  - repeat lactate  - f/u BCX and UCXR  - will be on fluids    Dementia/Restlessness  - continue with ativan 1mg q4h standing  - palliative care consult     DM2   - was on lantus 36units at [facility] but during previous admissions, she was only on coverage scale, requiring minimal coverage at times  - therefore will hold lantus for now and start medium coverage scale with FS q6h while on tube feeds  - will be on Glucerna 1.5     Preventive measures  - heparin for DVT ppx

## 2022-01-07 NOTE — PROGRESS NOTE ADULT - SUBJECTIVE AND OBJECTIVE BOX
Patient is a 76y Female whom presented to the hospital with manasa    PAST MEDICAL & SURGICAL HISTORY:  Dementia of frontal lobe type    Aphasic stroke    Diabetes mellitus    Respiratory failure    Hypertension    GERD (gastroesophageal reflux disease)    Constipation    Respiratory failure    CVA (cerebral vascular accident)    HTN (hypertension)    DM (diabetes mellitus)    Advanced dementia    COVID-19 virus detected    Quadriplegia    Pneumonia    Type II diabetes mellitus    Hx of appendectomy    Gastrostomy in place    Tracheostomy in place    Tracheostomy tube present    Feeding by G-tube        MEDICATIONS  (STANDING):  ALBUTerol    90 MICROgram(s) HFA Inhaler 2 Puff(s) Inhalation every 6 hours  dextrose 40% Gel 15 Gram(s) Oral once  dextrose 5%. 1000 milliLiter(s) (50 mL/Hr) IV Continuous <Continuous>  dextrose 5%. 1000 milliLiter(s) (100 mL/Hr) IV Continuous <Continuous>  dextrose 50% Injectable 25 Gram(s) IV Push once  dextrose 50% Injectable 12.5 Gram(s) IV Push once  dextrose 50% Injectable 25 Gram(s) IV Push once  glucagon  Injectable 1 milliGRAM(s) IntraMuscular once  heparin   Injectable 5000 Unit(s) SubCutaneous every 8 hours  insulin lispro (ADMELOG) corrective regimen sliding scale   SubCutaneous every 6 hours  lactobacillus acidophilus 1 Tablet(s) Oral daily  LORazepam     Tablet 1 milliGRAM(s) Oral every 4 hours  methocarbamol 500 milliGRAM(s) Oral two times a day  midodrine 2.5 milliGRAM(s) Oral every 8 hours  pantoprazole   Suspension 40 milliGRAM(s) Oral daily  senna 2 Tablet(s) Oral at bedtime  simethicone 80 milliGRAM(s) Chew every 6 hours      Allergies    codeine (Hives)    Intolerances        SOCIAL HISTORY:  Denies ETOh,Smoking,     FAMILY HISTORY:  No pertinent family history in first degree relatives        REVIEW OF SYSTEMS:    unable to obtained a good review system                            9.1    6.92  )-----------( 94       ( 2022 06:28 )             29.0       CBC Full  -  ( 2022 06:28 )  WBC Count : 6.92 K/uL  RBC Count : 3.57 M/uL  Hemoglobin : 9.1 g/dL  Hematocrit : 29.0 %  Platelet Count - Automated : 94 K/uL  Mean Cell Volume : 81.2 fl  Mean Cell Hemoglobin : 25.5 pg  Mean Cell Hemoglobin Concentration : 31.4 gm/dL  Auto Neutrophil # : 5.53 K/uL  Auto Lymphocyte # : 0.81 K/uL  Auto Monocyte # : 0.46 K/uL  Auto Eosinophil # : 0.04 K/uL  Auto Basophil # : 0.01 K/uL  Auto Neutrophil % : 80.0 %  Auto Lymphocyte % : 11.7 %  Auto Monocyte % : 6.6 %  Auto Eosinophil % : 0.6 %  Auto Basophil % : 0.1 %          131<L>  |  91<L>  |  223<H>  ----------------------------<  178<H>  3.9   |  30  |  3.41<H>    Ca    8.2<L>      2022 16:18  Phos  4.2       Mg     3.7         TPro  6.2  /  Alb  1.9<L>  /  TBili  0.4  /  DBili  x   /  AST  429<H>  /  ALT  137<H>  /  AlkPhos  135<H>        CAPILLARY BLOOD GLUCOSE      POCT Blood Glucose.: 192 mg/dL (2022 17:14)  POCT Blood Glucose.: 179 mg/dL (2022 11:26)  POCT Blood Glucose.: 229 mg/dL (2022 06:02)      Vital Signs Last 24 Hrs  T(C): 37 (2022 16:00), Max: 37.1 (2022 08:00)  T(F): 98.6 (2022 16:00), Max: 98.8 (2022 08:00)  HR: 82 (2022 18:00) (74 - 90)  BP: 96/46 (2022 18:00) (86/41 - 124/57)  BP(mean): 61 (2022 18:00) (54 - 74)  RR: 18 (2022 18:00) (16 - 26)  SpO2: 100% (2022 18:00) (98% - 100%)    Urinalysis Basic - ( 2022 04:58 )    Color: Yellow / Appearance: Slightly Turbid / S.010 / pH: x  Gluc: x / Ketone: Negative  / Bili: Negative / Urobili: Negative mg/dL   Blood: x / Protein: 30 mg/dL / Nitrite: Negative   Leuk Esterase: Moderate / RBC: 6-10 /HPF / WBC 11-25   Sq Epi: x / Non Sq Epi: Few / Bacteria: Few                PHYSICAL EXAM:    Constitutional: NAD  HEENT: conjunctive   clear   Neck:  No JVD  Respiratory: decrease bs b/l   Cardiovascular: S1 and S2  Gastrointestinal: BS+, soft, pos peg   Extremities: No peripheral edema

## 2022-01-07 NOTE — PROGRESS NOTE ADULT - SUBJECTIVE AND OBJECTIVE BOX
INTERVAL HPI/OVERNIGHT EVENTS:   Patient seen and examined.  Moans to loud verbal stimuli.    REVIEW OF SYSTEMS:  unable to obtain due to mental status    PHYSICAL EXAM:  Vital Signs Last 24 Hrs  T(C): 37.1 (2022 08:00), Max: 37.1 (2022 08:00)  T(F): 98.8 (2022 08:00), Max: 98.8 (2022 08:00)  HR: 77 (2022 08:00) (74 - 87)  BP: 88/43 (2022 08:00) (86/41 - 124/57)  BP(mean): 57 (2022 08:00) (54 - 67)  RR: 18 (2022 08:00) (16 - 18)  SpO2: 100% (2022 08:00) (99% - 100%)    GENERAL: NAD, moans to verbal stimulus, cachectic  EYES:  PERRLA, conjunctiva and sclera clear  NECK: Supple, No JVD, No Cervical LAD   NERVOUS SYSTEM:    Moving all 4 extremities, No facial droop  CHEST/LUNG: poor insp effort, no rales/wheezes heard  HEART: Regular rate and rhythm; No murmurs, rubs, or gallops  ABDOMEN: Soft, Nontender, Nondistended, Bowel sounds present, No palpable masses or organomegaly, No bruits  EXTREMITIES:  2+ Peripheral Pulses, No clubbing, cyanosis, or edema, no calf tenderness in either leg, b/l sarcopenia    Diagnostic Testin.1    6.92  )-----------( 94       ( 2022 06:28 )             29.0     2022 06:28    127    |  90     |  240    ----------------------------<  213    5.7     |  24     |  3.90     Ca    8.2        2022 06:28  Phos  4.2       2022 06:28  Mg     3.7       2022 06:28    TPro  6.2    /  Alb  1.9    /  TBili  0.4    /  DBili  x      /  AST  429    /  ALT  137    /  AlkPhos  135    2022 06:28      Urinalysis Basic - ( 2022 04:58 )    Color: Yellow / Appearance: Slightly Turbid / S.010 / pH: x  Gluc: x / Ketone: Negative  / Bili: Negative / Urobili: Negative mg/dL   Blood: x / Protein: 30 mg/dL / Nitrite: Negative   Leuk Esterase: Moderate / RBC: 6-10 /HPF / WBC 11-25   Sq Epi: x / Non Sq Epi: Few / Bacteria: Few

## 2022-01-07 NOTE — PROVIDER CONTACT NOTE (EICU) - RECOMMENDATIONS
76 yo F with Chronic Respiratory Failure and Vent Dependant from history of SAH and Cardiac Arrest, Dementia, HTN, DM2, COPD, and recent admission at  for acute respiratory/sepsis/aspiration PNA a/w ryan on ckd, hyperkalemia and hyponatremia and elevated troponins in the setting of RYAN.      - Clinically hypovolemic - give NS 1.5L IV bolus x 1; followed by NS @ 50cc/hr;   - Woody placement  - Treat hyperkalemia - s/p kayexylate in the ED; D50, IV insulin 5 units x 1; start Lokelma 10mg via PEG Q8H.  Nephrology recs reviewed, no indication for emergent HD at this time.  Follow up BMP after interventions.   - Continue with 18/400/5/50%; continue with bronchodilators  - Follow up CT Chest /Abd/Pelvis,  - Trend troponins with next labs  - Follow up COVID  Discussed with EDUARDO Garcia

## 2022-01-07 NOTE — CONSULT NOTE ADULT - ASSESSMENT
IMPRESSIONS:  Elevated liver enzymes, hepatocellular pattern, acute injury  Imaging notes cholelithiasis without evidence of cholecystitis  Considerations include ischemia   Dysphagia s/p PEG    RECOMMENDATIONS:  Trend daily liver tests  Check abdominal sonogram if signs of leukocytosis, fevers, sepsis  Continue tube feeding

## 2022-01-07 NOTE — PROGRESS NOTE ADULT - SUBJECTIVE AND OBJECTIVE BOX
BREA BECKHAM    [facility]    Allergies    codeine (Hives)    Intolerances        PAST MEDICAL & SURGICAL HISTORY:  Dementia of frontal lobe type    Aphasic stroke    Diabetes mellitus    Respiratory failure    Hypertension    GERD (gastroesophageal reflux disease)    Constipation    Respiratory failure    CVA (cerebral vascular accident)    HTN (hypertension)    DM (diabetes mellitus)    Advanced dementia    COVID-19 virus detected    Quadriplegia    Pneumonia    Type II diabetes mellitus    Hx of appendectomy    Gastrostomy in place    Tracheostomy in place    Tracheostomy tube present    Feeding by G-tube        FAMILY HISTORY:  No pertinent family history in first degree relatives        Home Medications:  albuterol 90 mcg/inh inhalation aerosol: 2 puff(s) inhaled every 6 hours (08 Dec 2021 16:51)  Bacid (LAC) oral tablet: 2 tab(s) by gastrostomy tube once a day (08 Dec 2021 16:51)  Carafate 1 g/10 mL oral suspension: 10 milliliter(s) by gastrostomy tube 4 times a day (before meals and at bedtime) for 14 days (Started 21) (08 Dec 2021 16:51)  chlorhexidine 0.12% mucous membrane liquid: 15 milliliter(s) mucous membrane 2 times a day (08 Dec 2021 16:51)  insulin lispro 100 units/mL injectable solution: injectable 3 times a day ; sliding scale coverage  (14 Dec 2021 10:38)  ipratropium-albuterol 0.5 mg-2.5 mg/3 mL inhalation solution: 3 milliliter(s) inhaled 4 times a day (08 Dec 2021 16:51)  LORazepam 1 mg oral tablet: 1 tab(s) orally every 4 hours (20 Dec 2021 08:32)  methocarbamol 500 mg oral tablet: 1 tab(s) orally 2 times a day (14 Dec 2021 19:18)  pantoprazole 40 mg oral granule, delayed release: 40 milligram(s) by gastrostomy tube 2 times a day (08 Dec 2021 16:51)  polyethylene glycol 3350 oral powder for reconstitution: 17 gram(s) orally every 12 hours (08 Dec 2021 16:51)  ProAir HFA 90 mcg/inh inhalation aerosol: 2 puff(s) inhaled every 6 hours (08 Dec 2021 16:51)  senna 8.6 mg oral tablet: 3 tab(s) by gastrostomy tube once a day (at bedtime) (08 Dec 2021 16:51)  simethicone 80 mg oral tablet, chewable: 1 tab(s) orally every 6 hours (08 Dec 2021 16:51)  Tylenol 325 mg oral tablet: 2 tab(s) by gastrostomy tube once a day; 60 minutes prior to dressing change  (08 Dec 2021 16:51)      MEDICATIONS  (STANDING):  ALBUTerol    90 MICROgram(s) HFA Inhaler 2 Puff(s) Inhalation every 6 hours  dextrose 40% Gel 15 Gram(s) Oral once  dextrose 5%. 1000 milliLiter(s) (50 mL/Hr) IV Continuous <Continuous>  dextrose 5%. 1000 milliLiter(s) (100 mL/Hr) IV Continuous <Continuous>  dextrose 50% Injectable 25 Gram(s) IV Push once  dextrose 50% Injectable 12.5 Gram(s) IV Push once  dextrose 50% Injectable 25 Gram(s) IV Push once  glucagon  Injectable 1 milliGRAM(s) IntraMuscular once  heparin   Injectable 5000 Unit(s) SubCutaneous every 8 hours  insulin lispro (ADMELOG) corrective regimen sliding scale   SubCutaneous every 6 hours  lactobacillus acidophilus 1 Tablet(s) Oral daily  LORazepam     Tablet 1 milliGRAM(s) Oral every 4 hours  methocarbamol 500 milliGRAM(s) Oral two times a day  pantoprazole   Suspension 40 milliGRAM(s) Oral daily  senna 2 Tablet(s) Oral at bedtime  simethicone 80 milliGRAM(s) Chew every 6 hours  sodium chloride 0.9%. 1000 milliLiter(s) (50 mL/Hr) IV Continuous <Continuous>  sodium zirconium cyclosilicate 10 Gram(s) Oral every 8 hours    MEDICATIONS  (PRN):  acetaminophen     Tablet .. 650 milliGRAM(s) Oral every 6 hours PRN Mild Pain (1 - 3)  aluminum hydroxide/magnesium hydroxide/simethicone Suspension 30 milliLiter(s) Oral every 4 hours PRN Dyspepsia  melatonin 3 milliGRAM(s) Oral at bedtime PRN Insomnia  ondansetron Injectable 4 milliGRAM(s) IV Push every 8 hours PRN Nausea and/or Vomiting  polyethylene glycol 3350 17 Gram(s) Oral every 12 hours PRN Constipation      Diet, NPO:   Tube Feeding Modality: Gastrostomy  Glucerna 1.5 Horacio  Total Volume for 24 Hours (mL): 1200  Continuous  Starting Tube Feed Rate mL per Hour: 50  Until Goal Tube Feed Rate (mL per Hour): 50  Tube Feed Duration (in Hours): 24  Tube Feed Start Time: 16:00  Free Water Flush  Bolus   Total Volume per Flush (mL): 400   Frequency: Every 6 Hours (22 @ 00:48) [Active]          Vital Signs Last 24 Hrs  T(C): 37 (2022 03:01), Max: 37 (2022 03:01)  T(F): 98.6 (2022 03:01), Max: 98.6 (2022 03:01)  HR: 85 (2022 07:21) (74 - 87)  BP: 86/58 (2022 06:00) (86/41 - 124/57)  BP(mean): 67 (2022 06:00) (54 - 67)  RR: 17 (2022 06:00) (16 - 18)  SpO2: 99% (2022 07:21) (99% - 100%)      22 @ 07:01  -  22 @ 07:00  --------------------------------------------------------  IN: 750 mL / OUT: 200 mL / NET: 550 mL        Mode: AC/ CMV (Assist Control/ Continuous Mandatory Ventilation), RR (machine): 18, TV (machine): 400, FiO2: 50, PEEP: 5, ITime: 1, MAP: 11, PIP: 28      LABS:                        9.1    6.92  )-----------( x        ( 2022 06:28 )             29.0         127<L>  |  90<L>  |  240<H>  ----------------------------<  213<H>  5.7<H>   |  24  |  3.90<H>    Ca    8.2<L>      2022 06:28  Phos  4.2       Mg     3.7         TPro  6.2  /  Alb  1.9<L>  /  TBili  0.4  /  DBili  x   /  AST  429<H>  /  ALT  137<H>  /  AlkPhos  135<H>        Urinalysis Basic - ( 2022 04:58 )    Color: Yellow / Appearance: Slightly Turbid / S.010 / pH: x  Gluc: x / Ketone: Negative  / Bili: Negative / Urobili: Negative mg/dL   Blood: x / Protein: 30 mg/dL / Nitrite: Negative   Leuk Esterase: Moderate / RBC: 6-10 /HPF / WBC 11-25   Sq Epi: x / Non Sq Epi: Few / Bacteria: Few            WBC:  WBC Count: 6.92 K/uL ( @ 06:28)  WBC Count: 7.16 K/uL ( @ 00:07)      MICROBIOLOGY:  RECENT CULTURES:        CARDIAC MARKERS ( 2022 07:03 )  x     / x     / 8440 U/L / x     / x      CARDIAC MARKERS ( 2022 00:07 )  x     / x     / 8776 U/L / x     / x                Sodium:  Sodium, Serum: 127 mmol/L ( @ 06:28)  Sodium, Serum: 123 mmol/L ( @ 00:07)  Sodium, Serum: 119 mmol/L ( @ 21:21)      3.90 mg/dL  @ 06:28  4.23 mg/dL  @ 00:07  4.47 mg/dL  @ 21:21      Hemoglobin:  Hemoglobin: 9.1 g/dL ( @ 06:28)  Hemoglobin: 8.7 g/dL ( @ 00:07)      Platelets: Platelet Count - Automated: 121 K/uL ( @ 00:07)      LIVER FUNCTIONS - ( 2022 06:28 )  Alb: 1.9 g/dL / Pro: 6.2 g/dL / ALK PHOS: 135 U/L / ALT: 137 U/L DA / AST: 429 U/L / GGT: x             Urinalysis Basic - ( 2022 04:58 )    Color: Yellow / Appearance: Slightly Turbid / S.010 / pH: x  Gluc: x / Ketone: Negative  / Bili: Negative / Urobili: Negative mg/dL   Blood: x / Protein: 30 mg/dL / Nitrite: Negative   Leuk Esterase: Moderate / RBC: 6-10 /HPF / WBC 11-25   Sq Epi: x / Non Sq Epi: Few / Bacteria: Few        RADIOLOGY & ADDITIONAL STUDIES:      MICROBIOLOGY:  RECENT CULTURES:

## 2022-01-07 NOTE — PROGRESS NOTE ADULT - SUBJECTIVE AND OBJECTIVE BOX
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 Admitted with hyponatremia and chronic respiratory failure      Allergies: codeine    PAST MEDICAL & SURGICAL HISTORY:  Dementia of frontal lobe type    Aphasic stroke    Diabetes mellitus    Respiratory failure    Hypertension    GERD (gastroesophageal reflux disease)    Constipation    Respiratory failure    CVA (cerebral vascular accident)    HTN (hypertension)    DM (diabetes mellitus)    Advanced dementia    COVID-19 virus detected    Quadriplegia    Pneumonia    Type II diabetes mellitus    Hx of appendectomy    Gastrostomy in place    Tracheostomy in place    Tracheostomy tube present    Feeding by G-tube      FAMILY HISTORY:  No pertinent family history in first degree relatives      SOCIAL HISTORY:    Home Medications:    Review of Systems:  Unable to participate in ROS    T(F): 97.5 (22 @ 20:00), Max: 98.8 (22 @ 08:00)  HR: 86 (22 @ 20:00) (74 - 90)  BP: 122/43 (22 @ 20:00) (86/41 - 124/57)  RR: 17 (22 @ 20:00) (16 - 26)  SpO2: 100% (22 @ 20:00)  Wt(kg): --  Mode: AC/ CMV (Assist Control/ Continuous Mandatory Ventilation), RR (machine): 18, TV (machine): 400, FiO2: 50, PEEP: 5  CAPILLARY BLOOD GLUCOSE      POCT Blood Glucose.: 192 mg/dL (2022 17:14)    I&O's Summary    2022 07:01  -  2022 07:00  --------------------------------------------------------  IN: 750 mL / OUT: 200 mL / NET: 550 mL    2022 07:01  -  2022 20:42  --------------------------------------------------------  IN: 2200 mL / OUT: 980 mL / NET: 1220 mL        Physical Exam:     Gen: chronically ill appearing  Neuro: contracted, does not follow commands  CVS: +S1S2  Resp: CTA. +trach  Abd: soft, NT, ND  Ext: warm, dr dempsey, contracted  Skin: no edema    Meds:    midodrine 2.5 milliGRAM(s) Oral every 8 hours     dextrose 40% Gel 15 Gram(s) Oral once  dextrose 50% Injectable 25 Gram(s) IV Push once  dextrose 50% Injectable 12.5 Gram(s) IV Push once  dextrose 50% Injectable 25 Gram(s) IV Push once  glucagon  Injectable 1 milliGRAM(s) IntraMuscular once  insulin lispro (ADMELOG) corrective regimen sliding scale   SubCutaneous every 6 hours     ALBUTerol    90 MICROgram(s) HFA Inhaler 2 Puff(s) Inhalation every 6 hours     acetaminophen     Tablet .. 650 milliGRAM(s) Oral every 6 hours PRN  LORazepam     Tablet 1 milliGRAM(s) Oral every 4 hours  melatonin 3 milliGRAM(s) Oral at bedtime PRN  methocarbamol 500 milliGRAM(s) Oral two times a day  ondansetron Injectable 4 milliGRAM(s) IV Push every 8 hours PRN        heparin   Injectable 5000 Unit(s) SubCutaneous every 8 hours     aluminum hydroxide/magnesium hydroxide/simethicone Suspension 30 milliLiter(s) Oral every 4 hours PRN  pantoprazole   Suspension 40 milliGRAM(s) Oral daily  polyethylene glycol 3350 17 Gram(s) Oral every 12 hours PRN  senna 2 Tablet(s) Oral at bedtime  simethicone 80 milliGRAM(s) Chew every 6 hours        dextrose 5%. 1000 milliLiter(s) IV Continuous <Continuous>  dextrose 5%. 1000 milliLiter(s) IV Continuous <Continuous>  dextrose 5%. 500 milliLiter(s) IV Continuous <Continuous>           lactobacillus acidophilus 1 Tablet(s) Oral daily                           9.1    6.92  )-----------( 94       ( 2022 06:28 )             29.0       01-07    131<L>  |  91<L>  |  223<H>  ----------------------------<  178<H>  3.9   |  30  |  3.41<H>    Ca    8.2<L>      2022 16:18  Phos  4.2       Mg     3.7         TPro  6.2  /  Alb  1.9<L>  /  TBili  0.4  /  DBili  x   /  AST  429<H>  /  ALT  137<H>  /  AlkPhos  135<H>      Lactate 2.8            @ 01:45    Lactate 2.6            @ 21:21      CARDIAC MARKERS ( 2022 07:03 )  x     / x     / 8440 U/L / x     / x      CARDIAC MARKERS ( 2022 00:07 )  x     / x     / 8776 U/L / x     / x            Urinalysis Basic - ( 2022 04:58 )    Color: Yellow / Appearance: Slightly Turbid / S.010 / pH: x  Gluc: x / Ketone: Negative  / Bili: Negative / Urobili: Negative mg/dL   Blood: x / Protein: 30 mg/dL / Nitrite: Negative   Leuk Esterase: Moderate / RBC: 6-10 /HPF / WBC 11-25   Sq Epi: x / Non Sq Epi: Few / Bacteria: Few          Rapid RVP Result: NotDetec ( @ 21:21)      Radiology:   < from: CT Chest No Cont (22 @ 23:39) >    ACC: [id number] EXAM:  CT ABDOMEN AND PELVIS                        ACC: [id number] EXAM:  CT CHEST                          PROCEDURE DATE:  2022          INTERPRETATION:  CLINICAL INFORMATION: Respiratory failure, renal failure    COMPARISON: CT chest, abdomen pelvis 2021    CONTRAST/COMPLICATIONS:  IV Contrast: NONE  Oral Contrast: NONE  Complications: None reported at time of study completion    PROCEDURE:  CT of the Chest, Abdomen and Pelvis was performed.  Sagittal and coronal reformats were performed.    FINDINGS:  CHEST:  LUNGS AND LARGE AIRWAYS: Tracheostomy tube in place, with trace layering   secretions in the proximal trachea. Increased consolidation and volume   loss in the right lower lobe when compared with the prior study. Patchy   bilateral groundglass opacities and centrilobular tree-in-bud nodularity,   similar when compared with the previous examination. Redemonstration of   calcified material in the right lower lobe likely representing aspirated   contents.  PLEURA: Trace right and small left pleural effusions decreased since the   prior study.  VESSELS: Atherosclerotic calcifications of the aorta and coronary   arteries.  HEART: Heart size is normal. Trace pericardial effusion. Mitral annular   calcifications.  MEDIASTINUM AND JHONNY: Improvement in mediastinal and bilateral hilar   lymphadenopathy.  CHEST WALL AND LOWER NECK: Patulous air-filled proximal esophagus.    ABDOMEN AND PELVIS:  LIVER: Enlarged. No focal lesions.  BILE DUCTS: Normal caliber.  GALLBLADDER: Cholelithiasis.  SPLEEN: Top normal in size.  PANCREAS: Within normal limits.  ADRENALS: Nonspecific mild adrenal gland thickening bilaterally.  KIDNEYS/URETERS: Punctate nonobstructing bilateral renal calculi. No   hydronephrosis, significant perinephric inflammation or fluid collection.    BLADDER: Mildly distended, with mild circumferential wall thickening. Few   punctate calculi within the posterior aspect of the bladder.  REPRODUCTIVE ORGANS: No adnexal masses.    BOWEL: Gastrostomy tube terminates in the stomach. Oral contrast is seen   within the stomach and within small and large bowel loops. Slightly   improved rectal dilatation. No bowel obstruction. Appendix is surgically   absent.  PERITONEUM: Trace ascites. No free air.  VESSELS: Atherosclerotic changes.  RETROPERITONEUM/LYMPH NODES: No lymphadenopathy.  ABDOMINAL WALL: Mild diffuse subcutaneous edema.  BONES: Diffuse osteopenia. Stable chronic fracture deformity of the left   femur with surrounding callus. Multilevel degenerative changes of the   spine.    IMPRESSION:  Patchy groundglass opacities and tree-in-bud nodularity likely related to   multifocal infection and distal airways impaction, similar when compared   with the prior study, although increasedconsolidation volume loss in the   right lower lobe when compared with the prior examination. Improvement in   small left and trace right pleural effusions.    No hydronephrosis. Underdistended bladder, apparent bladder wall   thickening, correlate with urinalysis to exclude UTI.        --- End of Report ---      < end of copied text >      Problems  -Chronic respiratory failure  -Acute renal failure  -Hyponatremia  -Rhabdo  -Thrombocytopenia    Assessment/Plan:    Neuro: Ativan standing as anxiolytic  CV: Midodrine for BP support  Resp: Chronic respiratory failure; maintain full mechanical ventilation. Titrate as needed for O2 sat >90%  GI: Transaminitis likely in setting of ischemia; monitor LFT's. GI following  Renal: Acute renal failure likely in setting of dehydration; trend Crt and strict I's and O's. Hyponatremia upon admission s/p NS administration. Patient now w/ rapid improvement 119->131 in <24 hrs. Will order for D5W bolus in attempt to reverse rapid progression. Monitor BMP closely. Trend CK w/ gentle hydration  ID: Infectious disease following, will monitor off abx at this time  Heme: Thrombocytopenic no evidence of active bleed. Monitor PLT and transfuse PRN  Endo: Close glycemic control          CODE STATUS: ***  [facility] discussion: Y  Critical care time spent (mins): ***

## 2022-01-07 NOTE — H&P ADULT - HISTORY OF PRESENT ILLNESS
***Patient with dementia and is non-verbal.  Unable to provide any HPI or ROS.  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  As per paperwork from [facility], patient was noted to have sodium 124, K 7.1, BUN/Cr 216/4.1.  Also noted with WBC 12.7, hgb 10.9.  Sent here for further workup.  No mention of any fevers or vomiting from [facility] (vital signs at [facility] were /60  HR 79  RR 18, 98%  T 98.1F).  As In the ED, patient's triage vitals were /81 HR 80  RR 18, 99% on vent.  Labs were significant for WBC 7, hgb 8.7.  Initial BMP showed sodium of 119, BUN/Cr of 259/4.47 with K 6.1.  Also notable were elevated LFTs (AST//104), lactate 2.6, troponin 109.8.  Patient was given insulin 5 units, D50, and kayexelate for the hyperkalemia.  Nephrology was consulted and did not think the patient needed dialysis.  Repeat BMP showed improvement of sodium (123), K 5.5, BUN/Cr 252/4.23).  CT C/A/P showed "patchy groundglass opacities and tree-in-bud nodularity likely related to multifocal infection and distal airways impaction similar when compared with prior study, although increased consolidation volume loss in the right lower lobe when compared with prior examination.  Improvement in small left and trace right pleural effusion.  No hydronephrosis.  Undistended bladder, apparent bladder wall thickening, correlate with urinalysis to exclude UTI."

## 2022-01-07 NOTE — PROGRESS NOTE ADULT - ASSESSMENT
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 for abnormal labs.       Acute renal failure - possibly 2/2 ATN, no hydronephrosis seen on CT  - admitted to SPCU 2/2 vent status  - avoid nephrotoxic meds as much as possible  - IVF  - as per nephrology -> dialysis not needed at this time  - dose meds renally    Hyponatremia    - as per nephrology -> check UA, urine and serum osms, urine sodium, urine and serum uric acid  - will be on NS 50cc/hr as per nephrology  - avoid fast correction    Hyperkalemia  - given kayexelate  - also started on lokelma  - monitor with BMP    Elevated LFTs - unclear etiology, possibly from dehydration?, CT does not point to any specific etiology  - monitor LFTs  - consider GI consult    Elevated troponins - likely 2/2 renal failure  - will repeat  - monitor telemetry  - cardiology consult appreciated    Chronic resp failure/COPD  - maintain vent settings, goal of SaO2 >95%  - cont with nebs  - pulmonary to follow    Elevated lactate  - would not start on any abx at this time (no leukocytosis, no fevers, CT not that impressive compared to previous CT)  - will check inflammatory markers  - repeat lactate  - f/u BCX and UCXR  - will be on fluids    Dementia/Restlessness  - continue with ativan 1mg q4h standing  - palliative care consult     DM2   - was on lantus 36units at 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 for abnormal labs.       Acute renal failure - possibly 2/2 ATN, no hydronephrosis seen on CT  - admitted to SPCU 2/2 vent status  - avoid nephrotoxic meds as much as possible  - IVF  - as per nephrology -> dialysis not needed at this time  - dose meds renally    Hyponatremia    - as per nephrology -> check UA, urine and serum osms, urine sodium, urine and serum uric acid  - will be on NS 50cc/hr as per nephrology  - avoid fast correction    Hyperkalemia  - given kayexelate  - also started on lokelma  - monitor with BMP    Elevated LFTs - unclear etiology, possibly from dehydration?, CT does not point to any specific etiology  - monitor LFTs  - consider GI consult    Elevated troponins - likely 2/2 renal failure  - will repeat  - monitor telemetry  - cardiology consult appreciated    Chronic resp failure/COPD  - maintain vent settings, goal of SaO2 >95%  - cont with nebs  - pulmonary to follow    Elevated lactate  - would not start on any abx at this time (no leukocytosis, no fevers, CT not that impressive compared to previous CT)  - will check inflammatory markers  - repeat lactate  - f/u BCX and UCXR  - will be on fluids    Dementia/Restlessness  - continue with ativan 1mg q4h standing  - palliative care consult     DM2   - was on lantus 36units at [facility] but during previous admissions, she was only on coverage scale, requiring minimal coverage at times  - therefore will hold lantus for now and start medium coverage scale with FS q6h while on tube feeds  - will be on Glucerna 1.5     Advanced Illness, Protein-Calorie Malnutrition  - Nutrition consult  - turn and position q2    Preventive measures  - heparin for DVT ppx    Updated  over phone this morning.  Per recent visit when she was seen by palliative care, she is FULL CODE

## 2022-01-07 NOTE — PROGRESS NOTE ADULT - ASSESSMENT
HPI:  ***Patient with dementia and is non-verbal.  Unable to provide any HPI or ROS.  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  As per paperwork from [facility], patient was noted to have sodium 124, K 7.1, BUN/Cr 216/4.1.  Also noted with WBC 12.7, hgb 10.9.  Sent here for further workup.  No mention of any fevers or vomiting from [facility] (vital signs at [facility] were /60  HR 79  RR 18, 98%  T 98.1F).  As In the ED, patient's triage vitals were /81 HR 80  RR 18, 99% on vent.  Labs were significant for WBC 7, hgb 8.7.  Initial BMP showed sodium of 119, BUN/Cr of 259/4.47 with K 6.1.  Also notable were elevated LFTs (AST//104), lactate 2.6, troponin 109.8.  Patient was given insulin 5 units, D50, and kayexelate for the hyperkalemia.  Nephrology was consulted and did not think the patient needed dialysis.  Repeat BMP showed improvement of sodium (123), K 5.5, BUN/Cr 252/4.23).  CT C/A/P showed "patchy groundglass opacities and tree-in-bud nodularity likely related to multifocal infection and distal airways impaction similar when compared with prior study, although increased consolidation volume loss in the right lower lobe when compared with prior examination.  Improvement in small left and trace right pleural effusion.  No hydronephrosis.  Undistended bladder, apparent bladder wall thickening, correlate with urinalysis to exclude UTI."   (07 Jan 2022 01:07)          hyponatremia will check ua , urine osmolality , urine sodium , urine uric acid , serum sodium , serum osmolality , serum uric acid , f/u with hyponatremia work up , f/u with bmp , monitor i and o      hyperkalemia will give Kayexalate      ACUTE RENAL FAILURE:   Serum creatinine is  at     , approximating GFR at   ml/min.   There is no progression . No uremic symptoms  No evidence of anemia .  Fluid status stable.  Will continue to avoid nephrotoxic drugs.  Patient remains asymptomatic.   Continue current therapy.  hold  diuretic.  hold   ACE inhibitor.  hold   ARB.  Additional evaluation:   ECG,    echocardiogram,     CXR,  will obtained recent   renal ultrasound to evalaute kidney size and possible stones ,    ryan atn   ns 50 cc per hr

## 2022-01-07 NOTE — H&P ADULT - NSHPPHYSICALEXAM_GEN_ALL_CORE
PHYSICAL EXAM:  Vital Signs Last 24 Hrs  T(C): --  T(F): --  HR: 76 (07 Jan 2022 00:45) (74 - 80)  BP: 106/81 (06 Jan 2022 20:22) (106/81 - 106/81)  BP(mean): --  RR: 18 (06 Jan 2022 20:22) (18 - 18)  SpO2: 100% (07 Jan 2022 00:45) (99% - 100%)    GENERAL:     cachectic thin appearing female with contractures throughout in NAD  HEAD:     atraumatic  RESPIRATORY:     coarse breath sounds with vented breath sounds, no gross crackles or wheezing heard  CARDIOVASCULAR:     regular rate and rhythm, no murmurs or rubs or gallops, 2+ peripheral pulses  GASTROINTESTINAL:     soft, nondistended, +PEG in place  EXTREMITIES:     no clubbing or cyanosis or edema  MUSCULOSKELETAL:     +contractures throughout (upper and lower extremities)  NERVOUS SYSTEM:     does not respond to voice or pain  SKIN:     necrotic tips of both 1st toes  PSYCH:     appropriate, alert and orientated x3, good concentration

## 2022-01-07 NOTE — CONSULT NOTE ADULT - SUBJECTIVE AND OBJECTIVE BOX
History of Present Illness: The patient is a 76 year old female with a history of HTN, DM, CVA, dementia, chronic respiratory failure s/p trach, PEG, cardiac arrest, anemia who presents with abnormal labs including hyponatremia, RYAN. The patient is unable to provide additional history.    Past Medical/Surgical History:  HTN, DM, CVA, dementia, chronic respiratory failure s/p trach, PEG, cardiac arrest, anemia     Medications:  Home Medications:  albuterol 90 mcg/inh inhalation aerosol: 2 puff(s) inhaled every 6 hours (08 Dec 2021 16:51)  Bacid (LAC) oral tablet: 2 tab(s) by gastrostomy tube once a day (08 Dec 2021 16:51)  Carafate 1 g/10 mL oral suspension: 10 milliliter(s) by gastrostomy tube 4 times a day (before meals and at bedtime) for 14 days (Started 6/4/21) (08 Dec 2021 16:51)  chlorhexidine 0.12% mucous membrane liquid: 15 milliliter(s) mucous membrane 2 times a day (08 Dec 2021 16:51)  insulin lispro 100 units/mL injectable solution: injectable 3 times a day ; sliding scale coverage  (14 Dec 2021 10:38)  ipratropium-albuterol 0.5 mg-2.5 mg/3 mL inhalation solution: 3 milliliter(s) inhaled 4 times a day (08 Dec 2021 16:51)  LORazepam 1 mg oral tablet: 1 tab(s) orally every 4 hours (20 Dec 2021 08:32)  methocarbamol 500 mg oral tablet: 1 tab(s) orally 2 times a day (14 Dec 2021 19:18)  pantoprazole 40 mg oral granule, delayed release: 40 milligram(s) by gastrostomy tube 2 times a day (08 Dec 2021 16:51)  polyethylene glycol 3350 oral powder for reconstitution: 17 gram(s) orally every 12 hours (08 Dec 2021 16:51)  ProAir HFA 90 mcg/inh inhalation aerosol: 2 puff(s) inhaled every 6 hours (08 Dec 2021 16:51)  senna 8.6 mg oral tablet: 3 tab(s) by gastrostomy tube once a day (at bedtime) (08 Dec 2021 16:51)  simethicone 80 mg oral tablet, chewable: 1 tab(s) orally every 6 hours (08 Dec 2021 16:51)  Tylenol 325 mg oral tablet: 2 tab(s) by gastrostomy tube once a day; 60 minutes prior to dressing change  (08 Dec 2021 16:51)      Family History: Non-contributory family history of premature cardiovascular atherosclerotic disease    Social History: No tobacco, alcohol or drug use    Review of Systems:  Unable to obtain    Physical Exam:  Vitals:        Vital Signs Last 24 Hrs  T(C): 37.1 (07 Jan 2022 08:00), Max: 37.1 (07 Jan 2022 08:00)  T(F): 98.8 (07 Jan 2022 08:00), Max: 98.8 (07 Jan 2022 08:00)  HR: 77 (07 Jan 2022 08:00) (74 - 87)  BP: 88/43 (07 Jan 2022 08:00) (86/41 - 124/57)  BP(mean): 57 (07 Jan 2022 08:00) (54 - 67)  RR: 18 (07 Jan 2022 08:00) (16 - 18)  SpO2: 100% (07 Jan 2022 08:00) (99% - 100%)  General: Unresponsive  HEENT: Trach  Neck: No JVD, no carotid bruit  Lungs: Coarse bilaterally  CV: RRR, nl S1/S2, no M/R/G  Abdomen: S/NT/ND, +BS  Extremities: No LE edema, no cyanosis  Neuro: AAOx0  Skin: No rash    Labs:                        9.1    6.92  )-----------( 94       ( 07 Jan 2022 06:28 )             29.0     01-07    127<L>  |  90<L>  |  240<H>  ----------------------------<  213<H>  5.7<H>   |  24  |  3.90<H>    Ca    8.2<L>      07 Jan 2022 06:28  Phos  4.2     01-07  Mg     3.7     01-07    TPro  6.2  /  Alb  1.9<L>  /  TBili  0.4  /  DBili  x   /  AST  429<H>  /  ALT  137<H>  /  AlkPhos  135<H>  01-07    CARDIAC MARKERS ( 07 Jan 2022 07:03 )  x     / x     / 8440 U/L / x     / x      CARDIAC MARKERS ( 07 Jan 2022 00:07 )  x     / x     / 8776 U/L / x     / x              ECG: NSR, normal axis, incomplete RBBB

## 2022-01-07 NOTE — CONSULT NOTE ADULT - ASSESSMENT
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

## 2022-01-07 NOTE — CONSULT NOTE ADULT - SUBJECTIVE AND OBJECTIVE BOX
Date/Time Patient Seen:  		  Referring MD:   Data Reviewed	       Patient is a 76y old  Female who presents with a chief complaint of abnormal labs (07 Jan 2022 01:07)      Subjective/HPI   History of Present Illness:  Reason for Admission: abnormal labs  History of Present Illness:   ***Patient with dementia and is non-verbal.  Unable to provide any HPI or ROS.  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  As per paperwork from [facility], patient was noted to have sodium 124, K 7.1, BUN/Cr 216/4.1.  Also noted with WBC 12.7, hgb 10.9.  Sent here for further workup.  No mention of any fevers or vomiting from [facility] (vital signs at [facility] were /60  HR 79  RR 18, 98%  T 98.1F).  As In the ED, patient's triage vitals were /81 HR 80  RR 18, 99% on vent.  Labs were significant for WBC 7, hgb 8.7.  Initial BMP showed sodium of 119, BUN/Cr of 259/4.47 with K 6.1.  Also notable were elevated LFTs (AST//104), lactate 2.6, troponin 109.8.  Patient was given insulin 5 units, D50, and kayexelate for the hyperkalemia.  Nephrology was consulted and did not think the patient needed dialysis.  Repeat BMP showed improvement of sodium (123), K 5.5, BUN/Cr 252/4.23).  CT C/A/P showed "patchy groundglass opacities and tree-in-bud nodularity likely related to multifocal infection and distal airways impaction similar when compared with prior study, although increased consolidation volume loss in the right lower lobe when compared with prior examination.  Improvement in small left and trace right pleural effusion.  No hydronephrosis.  Undistended bladder, apparent bladder wall thickening, correlate with urinalysis to exclude UTI."      PAST MEDICAL & SURGICAL HISTORY:  Dementia of frontal lobe type    Aphasic stroke    Diabetes mellitus    Respiratory failure    Hypertension    GERD (gastroesophageal reflux disease)    Constipation    Respiratory failure    CVA (cerebral vascular accident)    HTN (hypertension)    DM (diabetes mellitus)    Advanced dementia    COVID-19 virus detected    Quadriplegia    Pneumonia    Type II diabetes mellitus    Hx of appendectomy    Gastrostomy in place    Tracheostomy in place    Tracheostomy tube present    Feeding by G-tube    PAST SURGICAL HISTORY:  Feeding by G-tube     Gastrostomy in place     Hx of appendectomy     Tracheostomy in place     Tracheostomy tube present.     FAMILY HISTORY:  No pertinent family history in first degree relatives. No pertinent family history of: heart disease and hypertension.     Social History:  Social History (marital status, living situation, occupation, tobacco use, alcohol and drug use, and sexual history): lemuel Tanner from [facility]     Tobacco Screening:  · Core Measure Site	No  · Has the patient used tobacco in the past 30 days?	Unable to assess due to patient's cognitive impairment    Risk Assessment:    Present on Admission:  Deep Venous Thrombosis	no  Pulmonary Embolus	no     Heart Failure:  Does this patient have a history of or has been diagnosed with heart failure? unknown.        Medication list         MEDICATIONS  (STANDING):  ALBUTerol    90 MICROgram(s) HFA Inhaler 2 Puff(s) Inhalation every 6 hours  dextrose 40% Gel 15 Gram(s) Oral once  dextrose 5%. 1000 milliLiter(s) (50 mL/Hr) IV Continuous <Continuous>  dextrose 5%. 1000 milliLiter(s) (100 mL/Hr) IV Continuous <Continuous>  dextrose 50% Injectable 25 Gram(s) IV Push once  dextrose 50% Injectable 12.5 Gram(s) IV Push once  dextrose 50% Injectable 25 Gram(s) IV Push once  glucagon  Injectable 1 milliGRAM(s) IntraMuscular once  heparin   Injectable 5000 Unit(s) SubCutaneous every 8 hours  insulin lispro (ADMELOG) corrective regimen sliding scale   SubCutaneous every 6 hours  lactobacillus acidophilus 1 Tablet(s) Oral daily  LORazepam     Tablet 1 milliGRAM(s) Oral every 4 hours  methocarbamol 500 milliGRAM(s) Oral two times a day  pantoprazole   Suspension 40 milliGRAM(s) Oral daily  senna 2 Tablet(s) Oral at bedtime  simethicone 80 milliGRAM(s) Chew every 6 hours  sodium chloride 0.9%. 1000 milliLiter(s) (50 mL/Hr) IV Continuous <Continuous>  sodium zirconium cyclosilicate 10 Gram(s) Oral every 8 hours    MEDICATIONS  (PRN):  acetaminophen     Tablet .. 650 milliGRAM(s) Oral every 6 hours PRN Mild Pain (1 - 3)  aluminum hydroxide/magnesium hydroxide/simethicone Suspension 30 milliLiter(s) Oral every 4 hours PRN Dyspepsia  melatonin 3 milliGRAM(s) Oral at bedtime PRN Insomnia  ondansetron Injectable 4 milliGRAM(s) IV Push every 8 hours PRN Nausea and/or Vomiting  polyethylene glycol 3350 17 Gram(s) Oral every 12 hours PRN Constipation         Vitals log        ICU Vital Signs Last 24 Hrs  T(C): 37 (07 Jan 2022 03:01), Max: 37 (07 Jan 2022 03:01)  T(F): 98.6 (07 Jan 2022 03:01), Max: 98.6 (07 Jan 2022 03:01)  HR: 85 (07 Jan 2022 07:21) (74 - 87)  BP: 86/58 (07 Jan 2022 06:00) (86/41 - 124/57)  BP(mean): 67 (07 Jan 2022 06:00) (54 - 67)  ABP: --  ABP(mean): --  RR: 17 (07 Jan 2022 06:00) (16 - 18)  SpO2: 99% (07 Jan 2022 07:21) (99% - 100%)       Mode: AC/ CMV (Assist Control/ Continuous Mandatory Ventilation)  RR (machine): 18  TV (machine): 400  FiO2: 50  PEEP: 5  ITime: 1  MAP: 11  PIP: 28      Input and Output:  I&O's Detail    06 Jan 2022 07:01  -  07 Jan 2022 07:00  --------------------------------------------------------  IN:    Enteral Tube Flush: 400 mL    Glucerna 1.5: 50 mL    sodium chloride 0.9%: 300 mL  Total IN: 750 mL    OUT:    Indwelling Catheter - Urethral (mL): 200 mL  Total OUT: 200 mL    Total NET: 550 mL          Lab Data                        9.1    6.92  )-----------( x        ( 07 Jan 2022 06:28 )             29.0     01-07    127<L>  |  90<L>  |  240<H>  ----------------------------<  213<H>  5.7<H>   |  24  |  3.90<H>    Ca    8.2<L>      07 Jan 2022 06:28  Phos  4.2     01-07  Mg     3.7     01-07    TPro  6.2  /  Alb  1.9<L>  /  TBili  0.4  /  DBili  x   /  AST  429<H>  /  ALT  137<H>  /  AlkPhos  135<H>  01-07      CARDIAC MARKERS ( 07 Jan 2022 07:03 )  x     / x     / 8440 U/L / x     / x      CARDIAC MARKERS ( 07 Jan 2022 00:07 )  x     / x     / 8776 U/L / x     / x            Review of Systems	      Objective     Physical Examination        Pertinent Lab findings & Imaging      Woody:  NO   Adequate UO     I&O's Detail    06 Jan 2022 07:01  -  07 Jan 2022 07:00  --------------------------------------------------------  IN:    Enteral Tube Flush: 400 mL    Glucerna 1.5: 50 mL    sodium chloride 0.9%: 300 mL  Total IN: 750 mL    OUT:    Indwelling Catheter - Urethral (mL): 200 mL  Total OUT: 200 mL    Total NET: 550 mL               Discussed with:     Cultures:	        Radiology      ACC: [id number] EXAM:  CT ABDOMEN AND PELVIS                        ACC: [id number] EXAM:  CT CHEST                          PROCEDURE DATE:  01/06/2022          INTERPRETATION:  CLINICAL INFORMATION: Respiratory failure, renal failure    COMPARISON: CT chest, abdomen pelvis 12/8/2021    CONTRAST/COMPLICATIONS:  IV Contrast: NONE  Oral Contrast: NONE  Complications: None reported at time of study completion    PROCEDURE:  CT of the Chest, Abdomen and Pelvis was performed.  Sagittal and coronal reformats were performed.    FINDINGS:  CHEST:  LUNGS AND LARGE AIRWAYS: Tracheostomy tube in place, with trace layering   secretions in the proximal trachea. Increased consolidation and volume   loss in the right lower lobe when compared with the prior study. Patchy   bilateral groundglass opacities and centrilobular tree-in-bud nodularity,   similar when compared with the previous examination. Redemonstration of   calcified material in the right lower lobe likely representing aspirated   contents.  PLEURA: Trace right and small left pleural effusions decreased since the   prior study.  VESSELS: Atherosclerotic calcifications of the aorta and coronary   arteries.  HEART: Heart size is normal. Trace pericardial effusion. Mitral annular   calcifications.  MEDIASTINUM AND JHONNY: Improvement in mediastinal and bilateral hilar   lymphadenopathy.  CHEST WALL AND LOWER NECK: Patulous air-filled proximal esophagus.    ABDOMEN AND PELVIS:  LIVER: Enlarged. No focal lesions.  BILE DUCTS: Normal caliber.  GALLBLADDER: Cholelithiasis.  SPLEEN: Top normal in size.  PANCREAS: Within normal limits.  ADRENALS: Nonspecific mild adrenal gland thickening bilaterally.  KIDNEYS/URETERS: Punctate nonobstructing bilateral renal calculi. No   hydronephrosis, significant perinephric inflammation or fluid collection.    BLADDER: Mildly distended, with mild circumferential wall thickening. Few   punctate calculi within the posterior aspect of the bladder.  REPRODUCTIVE ORGANS: No adnexal masses.    BOWEL: Gastrostomy tube terminates in the stomach. Oral contrast is seen   within the stomach and within small and large bowel loops. Slightly   improved rectal dilatation. No bowel obstruction. Appendix is surgically   absent.  PERITONEUM: Trace ascites. No free air.  VESSELS: Atherosclerotic changes.  RETROPERITONEUM/LYMPH NODES: No lymphadenopathy.  ABDOMINAL WALL: Mild diffuse subcutaneous edema.  BONES: Diffuse osteopenia. Stable chronic fracture deformity of the left   femur with surrounding callus. Multilevel degenerative changes of the   spine.    IMPRESSION:  Patchy groundglass opacities and tree-in-bud nodularity likely related to   multifocal infection and distal airways impaction, similar when compared   with the prior study, although increased consolidation volume loss in the   right lower lobe when compared with the prior examination. Improvement in   small left and trace right pleural effusions.    No hydronephrosis. Underdistended bladder, apparent bladder wall   thickening, correlate with urinalysis to exclude UTI.        --- End of Report ---            CONCETTA HEWITT MD; Attending Radiologist  This document has been electronically signed. Jan 7 2022 12:20AM

## 2022-01-08 DIAGNOSIS — L89.95 PRESSURE ULCER OF UNSPECIFIED SITE, UNSTAGEABLE: ICD-10-CM

## 2022-01-08 LAB
ALBUMIN SERPL ELPH-MCNC: 1.9 G/DL — LOW (ref 3.3–5)
ALP SERPL-CCNC: 136 U/L — HIGH (ref 30–120)
ALT FLD-CCNC: 134 U/L DA — HIGH (ref 10–60)
ANION GAP SERPL CALC-SCNC: 13 MMOL/L — SIGNIFICANT CHANGE UP (ref 5–17)
ANION GAP SERPL CALC-SCNC: 6 MMOL/L — SIGNIFICANT CHANGE UP (ref 5–17)
AST SERPL-CCNC: 267 U/L — HIGH (ref 10–40)
BILIRUB DIRECT SERPL-MCNC: 0.1 MG/DL — SIGNIFICANT CHANGE UP (ref 0–0.3)
BILIRUB INDIRECT FLD-MCNC: 0.3 MG/DL — SIGNIFICANT CHANGE UP (ref 0.2–1)
BILIRUB SERPL-MCNC: 0.4 MG/DL — SIGNIFICANT CHANGE UP (ref 0.2–1.2)
BUN SERPL-MCNC: 185 MG/DL — HIGH (ref 7–23)
BUN SERPL-MCNC: 202 MG/DL — HIGH (ref 7–23)
CALCIUM SERPL-MCNC: 7.8 MG/DL — LOW (ref 8.4–10.5)
CALCIUM SERPL-MCNC: 7.8 MG/DL — LOW (ref 8.4–10.5)
CHLORIDE SERPL-SCNC: 93 MMOL/L — LOW (ref 96–108)
CHLORIDE SERPL-SCNC: 98 MMOL/L — SIGNIFICANT CHANGE UP (ref 96–108)
CK SERPL-CCNC: 4649 U/L — HIGH (ref 26–192)
CO2 SERPL-SCNC: 28 MMOL/L — SIGNIFICANT CHANGE UP (ref 22–31)
CO2 SERPL-SCNC: 33 MMOL/L — HIGH (ref 22–31)
CREAT SERPL-MCNC: 2.21 MG/DL — HIGH (ref 0.5–1.3)
CREAT SERPL-MCNC: 2.66 MG/DL — HIGH (ref 0.5–1.3)
CULTURE RESULTS: SIGNIFICANT CHANGE UP
GLUCOSE SERPL-MCNC: 179 MG/DL — HIGH (ref 70–99)
GLUCOSE SERPL-MCNC: 260 MG/DL — HIGH (ref 70–99)
HCT VFR BLD CALC: 25.3 % — LOW (ref 34.5–45)
HGB BLD-MCNC: 7.9 G/DL — LOW (ref 11.5–15.5)
INR BLD: 0.96 RATIO — SIGNIFICANT CHANGE UP (ref 0.88–1.16)
LACTATE SERPL-SCNC: 2.9 MMOL/L — HIGH (ref 0.7–2)
MCHC RBC-ENTMCNC: 25.8 PG — LOW (ref 27–34)
MCHC RBC-ENTMCNC: 31.2 GM/DL — LOW (ref 32–36)
MCV RBC AUTO: 82.7 FL — SIGNIFICANT CHANGE UP (ref 80–100)
NRBC # BLD: 0 /100 WBCS — SIGNIFICANT CHANGE UP (ref 0–0)
PHOSPHATE SERPL-MCNC: 4.8 MG/DL — HIGH (ref 2.5–4.5)
PLATELET # BLD AUTO: 158 K/UL — SIGNIFICANT CHANGE UP (ref 150–400)
POTASSIUM SERPL-MCNC: 3.7 MMOL/L — SIGNIFICANT CHANGE UP (ref 3.5–5.3)
POTASSIUM SERPL-MCNC: 3.9 MMOL/L — SIGNIFICANT CHANGE UP (ref 3.5–5.3)
POTASSIUM SERPL-SCNC: 3.7 MMOL/L — SIGNIFICANT CHANGE UP (ref 3.5–5.3)
POTASSIUM SERPL-SCNC: 3.9 MMOL/L — SIGNIFICANT CHANGE UP (ref 3.5–5.3)
PROCALCITONIN SERPL-MCNC: 7.28 NG/ML — HIGH (ref 0.02–0.1)
PROT SERPL-MCNC: 5.8 G/DL — LOW (ref 6–8.3)
PROTHROM AB SERPL-ACNC: 11.6 SEC — SIGNIFICANT CHANGE UP (ref 10.6–13.6)
RBC # BLD: 3.06 M/UL — LOW (ref 3.8–5.2)
RBC # FLD: 15.8 % — HIGH (ref 10.3–14.5)
SODIUM SERPL-SCNC: 134 MMOL/L — LOW (ref 135–145)
SODIUM SERPL-SCNC: 137 MMOL/L — SIGNIFICANT CHANGE UP (ref 135–145)
SODIUM UR-SCNC: <20 MMOL/L — SIGNIFICANT CHANGE UP
SPECIMEN SOURCE: SIGNIFICANT CHANGE UP
URATE UR-MCNC: 6.4 MG/DL — SIGNIFICANT CHANGE UP
WBC # BLD: 5.59 K/UL — SIGNIFICANT CHANGE UP (ref 3.8–10.5)
WBC # FLD AUTO: 5.59 K/UL — SIGNIFICANT CHANGE UP (ref 3.8–10.5)

## 2022-01-08 PROCEDURE — 99233 SBSQ HOSP IP/OBS HIGH 50: CPT

## 2022-01-08 PROCEDURE — 99221 1ST HOSP IP/OBS SF/LOW 40: CPT

## 2022-01-08 RX ORDER — SODIUM CHLORIDE 9 MG/ML
250 INJECTION INTRAMUSCULAR; INTRAVENOUS; SUBCUTANEOUS ONCE
Refills: 0 | Status: DISCONTINUED | OUTPATIENT
Start: 2022-01-08 | End: 2022-01-08

## 2022-01-08 RX ORDER — POVIDONE-IODINE 5 %
1 AEROSOL (ML) TOPICAL DAILY
Refills: 0 | Status: DISCONTINUED | OUTPATIENT
Start: 2022-01-08 | End: 2022-01-11

## 2022-01-08 RX ORDER — SODIUM CHLORIDE 9 MG/ML
1000 INJECTION, SOLUTION INTRAVENOUS
Refills: 0 | Status: COMPLETED | OUTPATIENT
Start: 2022-01-08 | End: 2022-01-08

## 2022-01-08 RX ORDER — SODIUM CHLORIDE 9 MG/ML
250 INJECTION, SOLUTION INTRAVENOUS
Refills: 0 | Status: DISCONTINUED | OUTPATIENT
Start: 2022-01-08 | End: 2022-01-09

## 2022-01-08 RX ADMIN — Medication 1 MILLIGRAM(S): at 14:49

## 2022-01-08 RX ADMIN — Medication 1 APPLICATION(S): at 14:50

## 2022-01-08 RX ADMIN — Medication 6: at 17:27

## 2022-01-08 RX ADMIN — Medication 1 MILLIGRAM(S): at 21:40

## 2022-01-08 RX ADMIN — SODIUM CHLORIDE 250 MILLILITER(S): 9 INJECTION, SOLUTION INTRAVENOUS at 12:50

## 2022-01-08 RX ADMIN — Medication 1 MILLIGRAM(S): at 05:10

## 2022-01-08 RX ADMIN — SENNA PLUS 2 TABLET(S): 8.6 TABLET ORAL at 21:40

## 2022-01-08 RX ADMIN — Medication 1 MILLIGRAM(S): at 02:00

## 2022-01-08 RX ADMIN — ALBUTEROL 2 PUFF(S): 90 AEROSOL, METERED ORAL at 16:01

## 2022-01-08 RX ADMIN — METHOCARBAMOL 500 MILLIGRAM(S): 500 TABLET, FILM COATED ORAL at 05:10

## 2022-01-08 RX ADMIN — SODIUM CHLORIDE 500 MILLILITER(S): 9 INJECTION, SOLUTION INTRAVENOUS at 20:24

## 2022-01-08 RX ADMIN — SIMETHICONE 80 MILLIGRAM(S): 80 TABLET, CHEWABLE ORAL at 05:10

## 2022-01-08 RX ADMIN — HEPARIN SODIUM 5000 UNIT(S): 5000 INJECTION INTRAVENOUS; SUBCUTANEOUS at 05:09

## 2022-01-08 RX ADMIN — HEPARIN SODIUM 5000 UNIT(S): 5000 INJECTION INTRAVENOUS; SUBCUTANEOUS at 21:39

## 2022-01-08 RX ADMIN — HEPARIN SODIUM 5000 UNIT(S): 5000 INJECTION INTRAVENOUS; SUBCUTANEOUS at 14:49

## 2022-01-08 RX ADMIN — SIMETHICONE 80 MILLIGRAM(S): 80 TABLET, CHEWABLE ORAL at 17:27

## 2022-01-08 RX ADMIN — Medication 4: at 12:51

## 2022-01-08 RX ADMIN — MIDODRINE HYDROCHLORIDE 2.5 MILLIGRAM(S): 2.5 TABLET ORAL at 21:40

## 2022-01-08 RX ADMIN — Medication 1 TABLET(S): at 12:51

## 2022-01-08 RX ADMIN — SIMETHICONE 80 MILLIGRAM(S): 80 TABLET, CHEWABLE ORAL at 12:51

## 2022-01-08 RX ADMIN — ALBUTEROL 2 PUFF(S): 90 AEROSOL, METERED ORAL at 07:35

## 2022-01-08 RX ADMIN — Medication 1 MILLIGRAM(S): at 09:34

## 2022-01-08 RX ADMIN — Medication 4: at 05:09

## 2022-01-08 RX ADMIN — Medication 1 MILLIGRAM(S): at 17:27

## 2022-01-08 RX ADMIN — ALBUTEROL 2 PUFF(S): 90 AEROSOL, METERED ORAL at 20:18

## 2022-01-08 RX ADMIN — MIDODRINE HYDROCHLORIDE 2.5 MILLIGRAM(S): 2.5 TABLET ORAL at 05:10

## 2022-01-08 RX ADMIN — ALBUTEROL 2 PUFF(S): 90 AEROSOL, METERED ORAL at 01:33

## 2022-01-08 RX ADMIN — METHOCARBAMOL 500 MILLIGRAM(S): 500 TABLET, FILM COATED ORAL at 17:27

## 2022-01-08 RX ADMIN — MIDODRINE HYDROCHLORIDE 2.5 MILLIGRAM(S): 2.5 TABLET ORAL at 14:49

## 2022-01-08 RX ADMIN — PANTOPRAZOLE SODIUM 40 MILLIGRAM(S): 20 TABLET, DELAYED RELEASE ORAL at 12:51

## 2022-01-08 NOTE — PROGRESS NOTE ADULT - SUBJECTIVE AND OBJECTIVE BOX
Chief Complaint: Abnormal labs    Interval Events: No events overnight.    Review of Systems:  General: No fevers, chills, weight gain  Skin: No rashes, color changes  Cardiovascular: No chest pain, orthopnea  Respiratory: No shortness of breath, cough  Gastrointestinal: No nausea, abdominal pain  Genitourinary: No incontinence, pain with urination  Musculoskeletal: No pain, swelling, decreased range of motion  Neurological: No headache, weakness  Psychiatric: No depression, anxiety  Endocrine: No weight gain, increased thirst  All other systems are comprehensively negative.    Physical Exam:  Vitals:        Vital Signs Last 24 Hrs  T(C): 36.4 (08 Jan 2022 08:00), Max: 37 (07 Jan 2022 16:00)  T(F): 97.6 (08 Jan 2022 08:00), Max: 98.6 (07 Jan 2022 16:00)  HR: 82 (08 Jan 2022 08:00) (77 - 90)  BP: 96/46 (08 Jan 2022 08:00) (81/37 - 122/43)  BP(mean): 61 (08 Jan 2022 08:00) (51 - 74)  RR: 19 (08 Jan 2022 08:00) (15 - 26)  SpO2: 100% (08 Jan 2022 08:00) (98% - 100%)  General: NAD  HEENT: MMM  Neck: No JVD, no carotid bruit  Lungs: CTAB  CV: RRR, nl S1/S2, no M/R/G  Abdomen: S/NT/ND, +BS  Extremities: No LE edema, no cyanosis  Neuro: AAOx3, non-focal  Skin: No rash    Labs:                        7.9    5.59  )-----------( 158      ( 08 Jan 2022 07:07 )             25.3     01-08    134<L>  |  93<L>  |  202<H>  ----------------------------<  179<H>  3.9   |  28  |  2.66<H>    Ca    7.8<L>      08 Jan 2022 07:07  Phos  4.8     01-08  Mg     3.7     01-07    TPro  5.8<L>  /  Alb  1.9<L>  /  TBili  0.4  /  DBili  0.1  /  AST  267<H>  /  ALT  134<H>  /  AlkPhos  136<H>  01-08    CARDIAC MARKERS ( 08 Jan 2022 07:07 )  x     / x     / 4649 U/L / x     / x      CARDIAC MARKERS ( 07 Jan 2022 07:03 )  x     / x     / 8440 U/L / x     / x      CARDIAC MARKERS ( 07 Jan 2022 00:07 )  x     / x     / 8776 U/L / x     / x          PT/INR - ( 08 Jan 2022 07:07 )   PT: 11.6 sec;   INR: 0.96 ratio             Telemetry: Sinus rhythm

## 2022-01-08 NOTE — PROGRESS NOTE ADULT - ASSESSMENT
PARASHARAMI BREA 75 f [facility] S 1/6/2022   DR ILIANA KEMP     REVIEW OF SYMPTOMS      Able to give (reliable) ROS  NO     PHYSICAL EXAM    HEENT Unremarkable  atraumatic   RESP Fair air entry EXP prolonged    Harsh breath sound Resp distres mild   CARDIAC S1 S2 No S3     NO JVD    ABDOMEN SOFT BS PRESENT NOT DISTENDED No hepatosplenomegaly   PEDAL EDEMA present No calf tenderness  NO rash     ______  DOA/CC.  1/6/2022 76 f ho anoxic encephalopathy functional quad trach peg sent from [facility] 1/6/2022 for multiple abn labs ryan   _____                            PROBLEMS POA.  Lacticemia poa 1/6/2022 la 2.6   VDRF Vent dependent resp failure pmh  MI poa Tr 1/6/2022 Tr 109   CHF poa bnp 1/6/2022 bnp 8683   Hyponatremia poa 1/6/2022 Na 119   Hyperkalemia poa 1/6/2022 K 6.1   RYAN poa 1/6/2022 Cr 4.4   ELEVATED LFTS poa 1/6/2022           Rhabdo poa     pmh VDRF sp trach poa   pmh PEG  pmh spasms  pmh anoxic encephalopathy    _____                            COVID STATUS. scv2 1/6/2022 (-)  ICU STAY.1/6/2022   GOC.1/6/2022 full code         BEST PRACTICE ISSUES.                                                  HEAD OF BED ELEVATION. Yes  DVT PROPHYLAXIS.    1/7/2022 hpsc   SQUIRES PROPHYLAXIS.        1/7/2022 protonix                                                                                 DIET.          1/7/2022 glucerna 1.5 1200    INFECTION PROPHYLAXIS.    SPEECH SWALLOW RECOMMENDATIONS.     PATIENT DATA   VITALS/PO/IO/VENT/DRIPS.    1/8/2022 afeb 85 99/47   1/8/2022 ac 18/400/5/.4   1/8/2022 u 1100      ASSESSMENT/RECOMMENDATIONS.    HEMODYNAMICS.   Monitor bp Target MAP 65 (+)  Echo 9/8/2021 ECHO n lvsf mild dd pasp 51   1/6/2022 /80   1/7/2022 88/43   1/7/2022 midodrine 2.5 x 3     RESP.   Monitor po Target po 90-95%    OXYGEN REQUIREMENTS.   O2 1/7/2022 O2 0.5     VENT MANAGEMENT.   Vent dependent resp failure pmh  HOB elevation  Target Pplat 35 (-)  Target PO 90-95%  Target pH 730 (+)    INFECTION.   Pt was seen 1/7 by ID Dr Ky Milner deferred    Lacticemia poa   la 1/6-1/7-1/8/2022 la 2.6 - 2.8 - 2.9   a/r   1/6/2022 iv fluids and monitor serially    COPD   1/7 albuterol       MI poa   Tr 1/6-1/7/2022 Tr 109 - 76   Monior  elevated trop in setting of ryan may not mean acs    CHF poa   bnp 1/6/2022 bnp 8683         ANEMIA.   Hb 1/7-1/8/2022 Hb 9.1 - 7.9     ELEVATED LFTS poa  LFTS  1/6-1/7-1/8/2022    - 135-136   - 429-267   - 137 - 134    RO VTE.  V duplx 12/16 v duplx (-)        Hyponatremia poa   Na 1/6-1/7-1/8/2022 Na 119 - 127 - 134     RHABDO   CK 1/7-1/8/2022 ck 8440- 4649  ua 1/7/2022 w 11-25 few bact r 6-10 blod lg     RYAN poa   Cr 1/6-1/7-1/8/2022 Cr 4.4 - 3.9 - 2.6  1/8/2022 Nonoliguric     TIME SPENT   Over 36 minutes aggregate critical care time spent on encounter; activities included   direct patient care, counseling and/or coordinating care reviewing notes, lab data/ imaging , discussion with multidisciplinary team/ patient  /family and explaining in detail risks, benefits, alternatives  of the recommendations     CHAPINCITO GUIDRY 75 f [facility] S 1/6/2022   DR ILIANA KEMP

## 2022-01-08 NOTE — PROGRESS NOTE ADULT - SUBJECTIVE AND OBJECTIVE BOX
BREA BECKHAM    [facility]    Allergies    codeine (Hives)    Intolerances        PAST MEDICAL & SURGICAL HISTORY:  Dementia of frontal lobe type    Aphasic stroke    Diabetes mellitus    Respiratory failure    Hypertension    GERD (gastroesophageal reflux disease)    Constipation    Respiratory failure    CVA (cerebral vascular accident)    HTN (hypertension)    DM (diabetes mellitus)    Advanced dementia    COVID-19 virus detected    Quadriplegia    Pneumonia    Type II diabetes mellitus    Hx of appendectomy    Gastrostomy in place    Tracheostomy in place    Tracheostomy tube present    Feeding by G-tube        FAMILY HISTORY:  No pertinent family history in first degree relatives        Home Medications:  albuterol 90 mcg/inh inhalation aerosol: 2 puff(s) inhaled every 6 hours (08 Dec 2021 16:51)  Bacid (LAC) oral tablet: 2 tab(s) by gastrostomy tube once a day (08 Dec 2021 16:51)  Carafate 1 g/10 mL oral suspension: 10 milliliter(s) by gastrostomy tube 4 times a day (before meals and at bedtime) for 14 days (Started 21) (08 Dec 2021 16:51)  chlorhexidine 0.12% mucous membrane liquid: 15 milliliter(s) mucous membrane 2 times a day (08 Dec 2021 16:51)  insulin lispro 100 units/mL injectable solution: injectable 3 times a day ; sliding scale coverage  (14 Dec 2021 10:38)  ipratropium-albuterol 0.5 mg-2.5 mg/3 mL inhalation solution: 3 milliliter(s) inhaled 4 times a day (08 Dec 2021 16:51)  LORazepam 1 mg oral tablet: 1 tab(s) orally every 4 hours (20 Dec 2021 08:32)  methocarbamol 500 mg oral tablet: 1 tab(s) orally 2 times a day (14 Dec 2021 19:18)  pantoprazole 40 mg oral granule, delayed release: 40 milligram(s) by gastrostomy tube 2 times a day (08 Dec 2021 16:51)  polyethylene glycol 3350 oral powder for reconstitution: 17 gram(s) orally every 12 hours (08 Dec 2021 16:51)  ProAir HFA 90 mcg/inh inhalation aerosol: 2 puff(s) inhaled every 6 hours (08 Dec 2021 16:51)  senna 8.6 mg oral tablet: 3 tab(s) by gastrostomy tube once a day (at bedtime) (08 Dec 2021 16:51)  simethicone 80 mg oral tablet, chewable: 1 tab(s) orally every 6 hours (08 Dec 2021 16:51)  Tylenol 325 mg oral tablet: 2 tab(s) by gastrostomy tube once a day; 60 minutes prior to dressing change  (08 Dec 2021 16:51)      MEDICATIONS  (STANDING):  ALBUTerol    90 MICROgram(s) HFA Inhaler 2 Puff(s) Inhalation every 6 hours  dextrose 40% Gel 15 Gram(s) Oral once  dextrose 5%. 1000 milliLiter(s) (50 mL/Hr) IV Continuous <Continuous>  dextrose 5%. 1000 milliLiter(s) (100 mL/Hr) IV Continuous <Continuous>  dextrose 50% Injectable 25 Gram(s) IV Push once  dextrose 50% Injectable 12.5 Gram(s) IV Push once  dextrose 50% Injectable 25 Gram(s) IV Push once  glucagon  Injectable 1 milliGRAM(s) IntraMuscular once  heparin   Injectable 5000 Unit(s) SubCutaneous every 8 hours  insulin lispro (ADMELOG) corrective regimen sliding scale   SubCutaneous every 6 hours  lactobacillus acidophilus 1 Tablet(s) Oral daily  LORazepam     Tablet 1 milliGRAM(s) Oral every 4 hours  methocarbamol 500 milliGRAM(s) Oral two times a day  midodrine 2.5 milliGRAM(s) Oral every 8 hours  pantoprazole   Suspension 40 milliGRAM(s) Oral daily  senna 2 Tablet(s) Oral at bedtime  simethicone 80 milliGRAM(s) Chew every 6 hours    MEDICATIONS  (PRN):  acetaminophen     Tablet .. 650 milliGRAM(s) Oral every 6 hours PRN Mild Pain (1 - 3)  aluminum hydroxide/magnesium hydroxide/simethicone Suspension 30 milliLiter(s) Oral every 4 hours PRN Dyspepsia  melatonin 3 milliGRAM(s) Oral at bedtime PRN Insomnia  ondansetron Injectable 4 milliGRAM(s) IV Push every 8 hours PRN Nausea and/or Vomiting  polyethylene glycol 3350 17 Gram(s) Oral every 12 hours PRN Constipation      Diet, NPO:   Tube Feeding Modality: Gastrostomy  Glucerna 1.5 Horacio  Total Volume for 24 Hours (mL): 1200  Continuous  Starting Tube Feed Rate mL per Hour: 50  Until Goal Tube Feed Rate (mL per Hour): 50  Tube Feed Duration (in Hours): 24  Tube Feed Start Time: 16:00  Free Water Flush  Bolus   Total Volume per Flush (mL): 200   Frequency: Every 4 Hours (22 @ 17:17) [Active]          Vital Signs Last 24 Hrs  T(C): 36.4 (2022 08:00), Max: 37 (2022 16:00)  T(F): 97.6 (2022 08:00), Max: 98.6 (2022 16:00)  HR: 84 (2022 10:02) (78 - 90)  BP: 105/48 (2022 10:02) (81/37 - 122/43)  BP(mean): 65 (2022 10:02) (51 - 74)  RR: 15 (2022 10:02) (15 - 26)  SpO2: 100% (2022 10:02) (98% - 100%)      22 @ 07:01  -  22 @ 07:00  --------------------------------------------------------  IN: 3100 mL / OUT: 2080 mL / NET: 1020 mL        Mode: AC/ CMV (Assist Control/ Continuous Mandatory Ventilation), RR (machine): 18, TV (machine): 400, FiO2: 40, PEEP: 5, ITime: 1, MAP: 11, PIP: 28      LABS:                        7.9    5.59  )-----------( 158      ( 2022 07:07 )             25.3     01-08    134<L>  |  93<L>  |  202<H>  ----------------------------<  179<H>  3.9   |  28  |  2.66<H>    Ca    7.8<L>      2022 07:07  Phos  4.8     01-08  Mg     3.7     01-07    TPro  5.8<L>  /  Alb  1.9<L>  /  TBili  0.4  /  DBili  0.1  /  AST  267<H>  /  ALT  134<H>  /  AlkPhos  136<H>      PT/INR - ( 2022 07:07 )   PT: 11.6 sec;   INR: 0.96 ratio           Urinalysis Basic - ( 2022 04:58 )    Color: Yellow / Appearance: Slightly Turbid / S.010 / pH: x  Gluc: x / Ketone: Negative  / Bili: Negative / Urobili: Negative mg/dL   Blood: x / Protein: 30 mg/dL / Nitrite: Negative   Leuk Esterase: Moderate / RBC: 6-10 /HPF / WBC 11-25   Sq Epi: x / Non Sq Epi: Few / Bacteria: Few            WBC:  WBC Count: 5.59 K/uL ( @ 07:07)  WBC Count: 6.92 K/uL ( @ 06:28)  WBC Count: 7.16 K/uL ( @ 00:07)      MICROBIOLOGY:  RECENT CULTURES:        CARDIAC MARKERS ( 2022 07:07 )  x     / x     / 4649 U/L / x     / x      CARDIAC MARKERS ( 2022 07:03 )  x     / x     / 8440 U/L / x     / x      CARDIAC MARKERS ( 2022 00:07 )  x     / x     / 8776 U/L / x     / x            PT/INR - ( 2022 07:07 )   PT: 11.6 sec;   INR: 0.96 ratio             Sodium:  Sodium, Serum: 134 mmol/L ( @ 07:07)  Sodium, Serum: 128 mmol/L ( @ 22:30)  Sodium, Serum: 131 mmol/L ( @ 16:18)  Sodium, Serum: 127 mmol/L ( @ 06:28)  Sodium, Serum: 123 mmol/L ( @ 00:07)  Sodium, Serum: 119 mmol/L ( @ 21:21)      2.66 mg/dL  @ 07:07  3.08 mg/dL  @ 22:30  3.41 mg/dL  @ 16:18  3.90 mg/dL  @ 06:28  4.23 mg/dL  @ 00:07  4.47 mg/dL  @ 21:21      Hemoglobin:  Hemoglobin: 7.9 g/dL ( @ 07:07)  Hemoglobin: 9.1 g/dL ( @ 06:28)  Hemoglobin: 8.7 g/dL ( @ 00:07)      Platelets: Platelet Count - Automated: 158 K/uL ( @ 07:07)  Platelet Count - Automated: 94 K/uL ( @ 06:28)  Platelet Count - Automated: 121 K/uL ( @ 00:07)      LIVER FUNCTIONS - ( 2022 07:07 )  Alb: 1.9 g/dL / Pro: 5.8 g/dL / ALK PHOS: 136 U/L / ALT: 134 U/L DA / AST: 267 U/L / GGT: x             Urinalysis Basic - ( 2022 04:58 )    Color: Yellow / Appearance: Slightly Turbid / S.010 / pH: x  Gluc: x / Ketone: Negative  / Bili: Negative / Urobili: Negative mg/dL   Blood: x / Protein: 30 mg/dL / Nitrite: Negative   Leuk Esterase: Moderate / RBC: 6-10 /HPF / WBC 11-25   Sq Epi: x / Non Sq Epi: Few / Bacteria: Few        RADIOLOGY & ADDITIONAL STUDIES:      MICROBIOLOGY:  RECENT CULTURES:

## 2022-01-08 NOTE — PROGRESS NOTE ADULT - SUBJECTIVE AND OBJECTIVE BOX
Date/Time Patient Seen:  		  Referring MD:   Data Reviewed	       Patient is a 76y old  Female who presents with a chief complaint of abnormal labs (07 Jan 2022 20:42)      Subjective/HPI     PAST MEDICAL & SURGICAL HISTORY:  Dementia of frontal lobe type    Aphasic stroke    Diabetes mellitus    Respiratory failure    Hypertension    GERD (gastroesophageal reflux disease)    Constipation    Respiratory failure    CVA (cerebral vascular accident)    HTN (hypertension)    DM (diabetes mellitus)    Advanced dementia    COVID-19 virus detected    Quadriplegia    Pneumonia    Type II diabetes mellitus    Hx of appendectomy    Gastrostomy in place    Tracheostomy in place    Tracheostomy tube present    Feeding by G-tube          Medication list         MEDICATIONS  (STANDING):  ALBUTerol    90 MICROgram(s) HFA Inhaler 2 Puff(s) Inhalation every 6 hours  dextrose 40% Gel 15 Gram(s) Oral once  dextrose 5%. 1000 milliLiter(s) (50 mL/Hr) IV Continuous <Continuous>  dextrose 5%. 1000 milliLiter(s) (100 mL/Hr) IV Continuous <Continuous>  dextrose 50% Injectable 25 Gram(s) IV Push once  dextrose 50% Injectable 12.5 Gram(s) IV Push once  dextrose 50% Injectable 25 Gram(s) IV Push once  glucagon  Injectable 1 milliGRAM(s) IntraMuscular once  heparin   Injectable 5000 Unit(s) SubCutaneous every 8 hours  insulin lispro (ADMELOG) corrective regimen sliding scale   SubCutaneous every 6 hours  lactobacillus acidophilus 1 Tablet(s) Oral daily  LORazepam     Tablet 1 milliGRAM(s) Oral every 4 hours  methocarbamol 500 milliGRAM(s) Oral two times a day  midodrine 2.5 milliGRAM(s) Oral every 8 hours  pantoprazole   Suspension 40 milliGRAM(s) Oral daily  senna 2 Tablet(s) Oral at bedtime  simethicone 80 milliGRAM(s) Chew every 6 hours    MEDICATIONS  (PRN):  acetaminophen     Tablet .. 650 milliGRAM(s) Oral every 6 hours PRN Mild Pain (1 - 3)  aluminum hydroxide/magnesium hydroxide/simethicone Suspension 30 milliLiter(s) Oral every 4 hours PRN Dyspepsia  melatonin 3 milliGRAM(s) Oral at bedtime PRN Insomnia  ondansetron Injectable 4 milliGRAM(s) IV Push every 8 hours PRN Nausea and/or Vomiting  polyethylene glycol 3350 17 Gram(s) Oral every 12 hours PRN Constipation         Vitals log        ICU Vital Signs Last 24 Hrs  T(C): 36.5 (08 Jan 2022 04:04), Max: 37.1 (07 Jan 2022 08:00)  T(F): 97.7 (08 Jan 2022 04:04), Max: 98.8 (07 Jan 2022 08:00)  HR: 83 (08 Jan 2022 06:00) (77 - 90)  BP: 93/56 (08 Jan 2022 06:00) (81/37 - 122/43)  BP(mean): 67 (08 Jan 2022 06:00) (51 - 74)  ABP: --  ABP(mean): --  RR: 21 (08 Jan 2022 06:00) (15 - 26)  SpO2: 100% (08 Jan 2022 06:00) (98% - 100%)       Mode: AC/ CMV (Assist Control/ Continuous Mandatory Ventilation)  RR (machine): 18  TV (machine): 400  FiO2: 40  PEEP: 5  ITime: 1  MAP: 11  PIP: 28      Input and Output:  I&O's Detail    07 Jan 2022 07:01  -  08 Jan 2022 07:00  --------------------------------------------------------  IN:    dextrose 5%: 500 mL    Enteral Tube Flush: 900 mL    Glucerna 1.5: 1200 mL    sodium chloride 0.9%: 500 mL  Total IN: 3100 mL    OUT:    Indwelling Catheter - Urethral (mL): 2080 mL  Total OUT: 2080 mL    Total NET: 1020 mL          Lab Data                        9.1    6.92  )-----------( 94       ( 07 Jan 2022 06:28 )             29.0     01-07    128<L>  |  91<L>  |  212<H>  ----------------------------<  294<H>  3.5   |  27  |  3.08<H>    Ca    7.7<L>      07 Jan 2022 22:30  Phos  4.2     01-07  Mg     3.7     01-07    TPro  6.2  /  Alb  1.9<L>  /  TBili  0.4  /  DBili  x   /  AST  429<H>  /  ALT  137<H>  /  AlkPhos  135<H>  01-07      CARDIAC MARKERS ( 07 Jan 2022 07:03 )  x     / x     / 8440 U/L / x     / x      CARDIAC MARKERS ( 07 Jan 2022 00:07 )  x     / x     / 8776 U/L / x     / x            Review of Systems	      Objective     Physical Examination    heart s1s2  lung dec BS  abd soft      Pertinent Lab findings & Imaging      Woody:  NO   Adequate UO     I&O's Detail    07 Jan 2022 07:01  -  08 Jan 2022 07:00  --------------------------------------------------------  IN:    dextrose 5%: 500 mL    Enteral Tube Flush: 900 mL    Glucerna 1.5: 1200 mL    sodium chloride 0.9%: 500 mL  Total IN: 3100 mL    OUT:    Indwelling Catheter - Urethral (mL): 2080 mL  Total OUT: 2080 mL    Total NET: 1020 mL               Discussed with:     Cultures:	        Radiology

## 2022-01-08 NOTE — PROGRESS NOTE ADULT - SUBJECTIVE AND OBJECTIVE BOX
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 She was admitted with Hyperkalemia and treated with Kayexalate she had hyponatremia and experienced a fast correction, was given bolus of 500cc D5 then an additional 250cc by renal.  LFT's with unclear etiology but thought to be due to dehydration. The lactate was elevated this morning but antibiotics not started due to lack of leukocytosis, no fevers and catscan not shows no source. He has had no acute events today and remains hemodynamically stable at this time   -    PAST MEDICAL & SURGICAL HISTORY:  Dementia of frontal lobe type  Aphasic stroke  Diabetes mellitus  Respiratory failure  Hypertension  GERD (gastroesophageal reflux disease)  Constipation  Respiratory failure  CV (cerebral vascular accident)  HTN (hypertension)  DM (diabetes mellitus)  Advanced dementia  COVID-19 virus detected  Quadriplegia  Pneumonia  Type II diabetes mellitus  Hx of appendectomy  Gastrostomy in place  Tracheostomy in place  Tracheostomy tube present  Feeding by G-tube    FAMILY HISTORY:  No pertinent family history in first degree relatives      ital Signs Last 24 Hrs  T(C): 36.6 (2022 12:00), Max: 36.6 (2022 00:05)  T(F): 97.8 (2022 12:00), Max: 97.8 (2022 00:05)  HR: 83 (2022 20:24) (78 - 87)  BP: 95/43 (2022 20:00) (81/37 - 105/48)  BP(mean): 59 (2022 20:00) (51 - 72)  RR: 18 (2022 20:00) (14 - 22)  SpO2: 100% (2022 20:24) (98% - 100%)    I&O's Summary  2022 07:01  -  2022 07:00  --------------------------------------------------------  IN: 3100 mL / OUT: 2080 mL / NET: 1020 mL    2022 07:01  -  2022 20:33  --------------------------------------------------------  IN: 2100 mL / OUT: 1350 mL / NET: 750 mL      LABS      137  |  98  |  185<H>  ----------------------------<  260<H>  3.7   |  33<H>  |  2.21<H>  Ca    7.8<L>      2022 17:03  Phos  4.8       Mg     3.7       TPro  5.8<L>  /  Alb  1.9<L>  /  TBili  0.4  /  DBili  0.1  /  AST  267<H>  /  ALT  134<H>  /  AlkPhos  136<H>                         7.9    5.59  )-----------( 158      ( 2022 07:07 )             25.3   PT/INR - ( 2022 07:07 )   PT: 11.6 sec;   INR: 0.96 ratio       CARDIAC MARKERS ( 2022 07:07 )  x     / x     / 4649 U/L / x     / x      CARDIAC MARKERS ( 2022 07:03 )  x     / x     / 8440 U/L / x     / x      CARDIAC MARKERS ( 2022 00:07 )  x     / x     / 8776 U/L / x     / x      LIVER FUNCTIONS - ( 2022 07:07 )  Alb: 1.9 g/dL / Pro: 5.8 g/dL / ALK PHOS: 136 U/L / ALT: 134 U/L DA / AST: 267 U/L / GGT: x           CAPILLARY BLOOD GLUCOSE  OCT Blood Glucose.: 284 mg/dL (2022 17:25)    Urinalysis Basic - ( 2022 04:58 )  Color: Yellow / Appearance: Slightly Turbid / S.010 / pH: x  Gluc: x / Ketone: Negative  / Bili: Negative / Urobili: Negative mg/dL   Blood: x / Protein: 30 mg/dL / Nitrite: Negative   Leuk Esterase: Moderate / RBC: 6-10 /HPF / WBC 11-25   Sq Epi: x / Non Sq Epi: Few / Bacteria: Few    VENT SETTINGS   Mode: AC/ CMV (Assist Control/ Continuous Mandatory Ventilation)  RR (machine): 18  TV (machine): 0.4  FiO2: 40  PEEP: 5  ITime: 1  MAP: 12  PIP: 28    MEDICATIONS  (STANDING):  ALBUTerol    90 MICROgram(s) HFA Inhaler 2 Puff(s) Inhalation every 6 hours  dextrose 40% Gel 15 Gram(s) Oral once  dextrose 5%. 1000 milliLiter(s) (50 mL/Hr) IV Continuous <Continuous>  dextrose 5%. 250 milliLiter(s) (250 mL/Hr) IV Continuous <Continuous>  dextrose 5%. 1000 milliLiter(s) (100 mL/Hr) IV Continuous <Continuous>  dextrose 50% Injectable 25 Gram(s) IV Push once  dextrose 50% Injectable 12.5 Gram(s) IV Push once  dextrose 50% Injectable 25 Gram(s) IV Push once  glucagon  Injectable 1 milliGRAM(s) IntraMuscular once  heparin   Injectable 5000 Unit(s) SubCutaneous every 8 hours  insulin lispro (ADMELOG) corrective regimen sliding scale   SubCutaneous every 6 hours  lactobacillus acidophilus 1 Tablet(s) Oral daily  LORazepam     Tablet 1 milliGRAM(s) Oral every 4 hours  methocarbamol 500 milliGRAM(s) Oral two times a day  midodrine 2.5 milliGRAM(s) Oral every 8 hours  pantoprazole   Suspension 40 milliGRAM(s) Oral daily  povidone iodine 10% Solution 1 Application(s) Topical daily  senna 2 Tablet(s) Oral at bedtime  simethicone 80 milliGRAM(s) Chew every 6 hours    REVIEW OF SYSTEMS:    Patient with dementia and is non-verbal and unable to obtain  SHEA       HEENT: PERRLA, EOMI, no drainage or redness  Neck: No bruits; no thyromegaly or nodules,  No JVD  Back: Normal spine flexure, No CVA tenderness, No deformity or limitation of movement  Respiratory: Breath Sounds equal & clear to percussion & auscultation, no accessory muscle use  Cardiovascular: Regular rate & rhythm, normal S1, S2; no murmurs, gallops or rubs; no S3, S4  Gastrointestinal: Soft, non-tender, non distended no hepatosplenomegaly, normal bowel sounds  Extremities: No peripheral edema, No cyanosis, clubbing   Vascular: Equal and normal pulses: 2+ peripheral pulses throughout  Neurological: GCS:    A&O x 3; no sensory, motor  deficits, normal reflexes  Psychiatric: Normal mood, normal affect  Musculoskeletal: No joint pain, swelling or deformity; no limitation of movement  Skin: No rashes

## 2022-01-08 NOTE — CONSULT NOTE ADULT - SUBJECTIVE AND OBJECTIVE BOX
Dots Note    HPI:  ***Patient with dementia and is non-verbal.  Unable to provide any HPI or ROS.  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  As per paperwork from [facility], patient was noted to have sodium 124, K 7.1, BUN/Cr 216/4.1.  Also noted with WBC 12.7, hgb 10.9.  Sent here for further workup.  No mention of any fevers or vomiting from [facility] (vital signs at [facility] were /60  HR 79  RR 18, 98%  T 98.1F).  As In the ED, patient's triage vitals were /81 HR 80  RR 18, 99% on vent.  Labs were significant for WBC 7, hgb 8.7.  Initial BMP showed sodium of 119, BUN/Cr of 259/4.47 with K 6.1.  Also notable were elevated LFTs (AST//104), lactate 2.6, troponin 109.8.  Patient was given insulin 5 units, D50, and kayexelate for the hyperkalemia.  Nephrology was consulted and did not think the patient needed dialysis.  Repeat BMP showed improvement of sodium (123), K 5.5, BUN/Cr 252/4.23).  CT C/A/P showed "patchy groundglass opacities and tree-in-bud nodularity likely related to multifocal infection and distal airways impaction similar when compared with prior study, although increased consolidation volume loss in the right lower lobe when compared with prior examination.  Improvement in small left and trace right pleural effusion.  No hydronephrosis.  Undistended bladder, apparent bladder wall thickening, correlate with urinalysis to exclude UTI."   (07 Jan 2022 01:07)      PAST MEDICAL & SURGICAL HISTORY:  Dementia of frontal lobe type    Aphasic stroke    Diabetes mellitus    Respiratory failure    Hypertension    GERD (gastroesophageal reflux disease)    Constipation    Respiratory failure    CVA (cerebral vascular accident)    HTN (hypertension)    DM (diabetes mellitus)    Advanced dementia    COVID-19 virus detected    Quadriplegia    Pneumonia    Type II diabetes mellitus    Hx of appendectomy    Gastrostomy in place    Tracheostomy in place    Tracheostomy tube present    Feeding by G-tube        REVIEW OF SYSTEMS      General:	    Skin/Breast:  	  Ophthalmologic:  	  ENMT:	    Respiratory and Thorax:  	  Cardiovascular:	    Gastrointestinal:	    Genitourinary:	    Musculoskeletal:	    Neurological:	    Psychiatric:	    Hematology/Lymphatics:	    Endocrine:	    Allergic/Immunologic:	  >>> <<<    REVIEW OF SYSTEMS  CONSTITUTIONAL: +weakness, no fevers or chills  HEENT: denies blurred visions, sore throat  SKIN: denies new lesions, rash  CARDIOVASCULAR: no palpitations  RESPIRATORY: denies shortness of breath, sputum production  GASTROINTESTINAL: denies nausea, vomiting, diarrhea, abdominal pain  all other ROS is negative unless indicated above  Unable to obtain ROS  2/2 non-verbal status  Patient is unable to provide any information/ROS  due to baseline mental status    MEDICATIONS  (STANDING):  ALBUTerol    90 MICROgram(s) HFA Inhaler 2 Puff(s) Inhalation every 6 hours  dextrose 40% Gel 15 Gram(s) Oral once  dextrose 5%. 1000 milliLiter(s) (50 mL/Hr) IV Continuous <Continuous>  dextrose 5%. 1000 milliLiter(s) (100 mL/Hr) IV Continuous <Continuous>  dextrose 5%. 250 milliLiter(s) (250 mL/Hr) IV Continuous <Continuous>  dextrose 50% Injectable 25 Gram(s) IV Push once  dextrose 50% Injectable 12.5 Gram(s) IV Push once  dextrose 50% Injectable 25 Gram(s) IV Push once  glucagon  Injectable 1 milliGRAM(s) IntraMuscular once  heparin   Injectable 5000 Unit(s) SubCutaneous every 8 hours  insulin lispro (ADMELOG) corrective regimen sliding scale   SubCutaneous every 6 hours  lactobacillus acidophilus 1 Tablet(s) Oral daily  LORazepam     Tablet 1 milliGRAM(s) Oral every 4 hours  methocarbamol 500 milliGRAM(s) Oral two times a day  midodrine 2.5 milliGRAM(s) Oral every 8 hours  pantoprazole   Suspension 40 milliGRAM(s) Oral daily  senna 2 Tablet(s) Oral at bedtime  simethicone 80 milliGRAM(s) Chew every 6 hours    MEDICATIONS  (PRN):  acetaminophen     Tablet .. 650 milliGRAM(s) Oral every 6 hours PRN Mild Pain (1 - 3)  aluminum hydroxide/magnesium hydroxide/simethicone Suspension 30 milliLiter(s) Oral every 4 hours PRN Dyspepsia  melatonin 3 milliGRAM(s) Oral at bedtime PRN Insomnia  ondansetron Injectable 4 milliGRAM(s) IV Push every 8 hours PRN Nausea and/or Vomiting  polyethylene glycol 3350 17 Gram(s) Oral every 12 hours PRN Constipation      Allergies    codeine (Hives)    Intolerances        SOCIAL HISTORY:  / single/ ; (+)HHA; Denies smoking, ETOH, drugs    FAMILY HISTORY:  No pertinent family history in first degree relatives        Vital Signs Last 24 Hrs  T(C): 36.6 (08 Jan 2022 12:00), Max: 37 (07 Jan 2022 16:00)  T(F): 97.8 (08 Jan 2022 12:00), Max: 98.6 (07 Jan 2022 16:00)  HR: 85 (08 Jan 2022 12:00) (78 - 90)  BP: 99/47 (08 Jan 2022 12:00) (81/37 - 122/43)  BP(mean): 64 (08 Jan 2022 12:00) (51 - 74)  RR: 14 (08 Jan 2022 12:00) (14 - 26)  SpO2: 100% (08 Jan 2022 12:00) (98% - 100%)    NAD / gaurded but stable,  A&Ox3/ Alert  cachectic/ MO/ Obese  within defined normal limits  Total Care Sport/ Versa Care P500 bed                            7.9    5.59  )-----------( 158      ( 08 Jan 2022 07:07 )             25.3     01-08    134<L>  |  93<L>  |  202<H>  ----------------------------<  179<H>  3.9   |  28  |  2.66<H>    Ca    7.8<L>      08 Jan 2022 07:07  Phos  4.8     01-08  Mg     3.7     01-07    TPro  5.8<L>  /  Alb  1.9<L>  /  TBili  0.4  /  DBili  0.1  /  AST  267<H>  /  ALT  134<H>  /  AlkPhos  136<H>  01-08    Procalcitonin, Serum: 7.28 ng/mL (01-08-22 @ 11:34)  Procalcitonin, Serum: 22.90 ng/mL (01-07-22 @ 16:17)    A1C with Estimated Average Glucose Result: 6.2 % (12-09-21 @ 14:24)    Respiratory: CTA    Gastrointestinal soft NT/ND (+)BS    Neurology  weakened strength & sensation grossly intact/ paraesthesia  nonverbal, Can not follow commands    Musculoskeletal/Vascular:  ROM  No edema, warm, no calf tenderness, no hair growth  DP/PT  hemosiderin staining  no deformities/ contractures  DP & PT pulses non-palpable bilaterally 2/2 +1 lower extremity pitting edema, Capillary refill 3 seconds, no hair growth, skin temp warm b/l.    Skin:  moist w/ good turgor  frail,  ecchymosis w/o hematoma  serosanguinous drainage, erythema  No odor, erythema, increased warmth, tenderness, induration, fluctuance          RADIOLOGY & ADDITIONAL STUDIES

## 2022-01-08 NOTE — PROGRESS NOTE ADULT - ASSESSMENT
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       supportive management  labs reviewed  imaging reviewed  on TF  vent support  GOC documented  full code  Marciano   lives in SNF - [facility]

## 2022-01-08 NOTE — CONSULT NOTE ADULT - CONSULT REQUESTED DATE/TIME
07-Jan-2022 07:41
07-Jan-2022 08:50
07-Jan-2022 09:49
06-Jan-2022
06-Jan-2022 23:00
07-Jan-2022 09:02
06-Jan-2022 22:17
08-Jan-2022 09:30

## 2022-01-08 NOTE — PROGRESS NOTE ADULT - ASSESSMENT
76 year old female admitted for electrolyte imbalances  with elevated LFT's and RYAN  Pt is hemodynamically stable at this time.     Neuro: Ativan standing for anxiolytic purpose  CV: Midodrine for support of blood pressure chronic   Resp: Chronic respiratory failure; maintain full mechanical ventilation. Titrate as needed for O2 sat >90%  GI: Transaminitis likely in setting of ischemia; monitor LFT's. GI following  Renal: Acute renal failure likely in setting of dehydration will trend creatinine with strict I's and O's. Hyponatremia upon admission with normal saline administration. Patient now w/ rapid improvement in 24 hrs, will monitor electrets closely. Trend CK w/ gentle hydration and  check  urine osmolality , urine sodium , serum sodium  and serum osmolality with morning labs   ID: Infectious disease following, will monitor off abx at this time  Heme: Thrombocytopenic no evidence of active bleed. Monitor PLT and transfuse PRN  Endo: Close glycemic control  holding lantus and using medium coverage slideing scale with FS q6h while on tube feeds    Time spent: 30 mins assessing presenting problems of acute illness that poses high probability  end organ damage/dysfunction.  Medical decision making inculding plan of daily care, reviewing data, reviewing radiology, discussing with multidisciplinary team, non inclusive of procedures, discussing goals of care with patient/family

## 2022-01-08 NOTE — DIETITIAN INITIAL EVALUATION ADULT. - PERTINENT LABORATORY DATA
(1/8) H/H 7.9/25.3, Na 134, , Cr 2.66, Glu 179, Alb 1.9, Alk phos 136, , , GFR 17, lactate 2.9, phos 4.8

## 2022-01-08 NOTE — PROGRESS NOTE ADULT - ASSESSMENT
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       Acute renal failure   - possibly 2/2 ATN, no hydronephrosis seen on CT  - Improving   - in SPCU 2/2 vent status  - avoid nephrotoxic meds as much as possible  - as per nephrology -> dialysis not needed at this time  - dose meds renally    Hyponatremia    - as per nephrology -> check UA, urine and serum osms, urine sodium, urine and serum uric acid  - noted to have fast correction last night, was given bolus of 500cc D5  - Discussed with nephrology Dr. Beavers, will give additional 250cc D5 bolus     Hyperkalemia  - given kayexelate  - monitor with BMP    Elevated LFTs   - unclear etiology, possibly from dehydration?, CT does not point to any specific etiology  - Improving   - monitor LFTs  - GI following     Elevated troponins   - likely 2/2 renal failure  - downtrending  - monitor telemetry  - cardiology consult appreciated    Chronic resp failure/COPD  - maintain vent settings, goal of SaO2 >95%  - cont with nebs  - pulmonary following     Elevated lactate  - would not start on any abx at this time (no leukocytosis, no fevers, CT not that impressive compared to previous CT)  - repeat lactate  - f/u BCX and UCXR  - Discussed elevated lactate with intensivist Dr. Sandoval, recommended to give 250cc fluid bolus and repeat lactate in AM    Dementia/Restlessness  - continue with ativan 1mg q4h standing  - palliative care following    DM2   - was on lantus 36units at [facility] but during previous admissions, she was only on coverage scale, requiring minimal coverage at times  - therefore will hold lantus for now and start mod dose iss q6h while on tube feeds  - on Glucerna 1.5     Advanced Illness, Protein-Calorie Malnutrition  - Nutrition consult  - turn and position q2    Preventive measures  - heparin for DVT ppx    Updated  over phone today. All questions answered to the best of my ability   PT is FULL CODE

## 2022-01-08 NOTE — DIETITIAN INITIAL EVALUATION ADULT. - OTHER INFO
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 As per paperwork from [facility], patient was noted to have sodium 124, K 7.1, BUN/Cr 216/4.1.  Also noted with WBC 12.7, hgb 10.9.  Sent here for further workup. No mention of any fevers or vomiting from [facility] (vital signs at [facility] were /60  HR 79  RR 18, 98%  T 98.1F).  As In the ED, patient's triage vitals were /81 HR 80  RR 18, 99% on vent.  Labs were significant for WBC 7, hgb 8.7.  Initial BMP showed sodium of 119, BUN/Cr of 259/4.47 with K 6.1.  Also notable were elevated LFTs (AST//104), lactate 2.6, troponin 109.8.  Patient was given insulin 5 units, D50, and kayexelate for the hyperkalemia.  Nephrology was consulted and did not think the patient needed dialysis.  Repeat BMP showed improvement of sodium (123), K 5.5, BUN/Cr 252/4.23).  CT C/A/P showed "patchy groundglass opacities and tree-in-bud nodularity likely related to multifocal infection and distal airways impaction similar when compared with prior study, although increased consolidation volume loss in the right lower lobe when compared with prior examination.  Improvement in small left and trace right pleural effusion.  No hydronephrosis. Undistended bladder, apparent bladder wall thickening, correlate with urinalysis to exclude UTI."     Nutrition consult ordered  for pressure ulcer stage II or >. Pt admitted w/ multiple pressure ulcers; unstageable to sacrum and unstageable to BL 1st toes. Pt admitted from [facility]; per transfer documents, pt receiving EN feeds via PEG of Glucerna 1.5 @ 50 ml/hr x 20hr for a total volume of 1000 ml/day, providing pt w/ 1500kcal and 82.5g protein per day. Pt also receiving 400cc water flush 4x day, total 1600cc free water/day. Pt currently receiving Glucerna 1.5 via peg @ 50ml/hr x 24 hrs (this is providing 1200ml total volume of formula, 1800kcal, 99g protein). Pt also receiving bolus free water flushes 200ml q 4 hours. Pt currently tolerating tube feeding well. CBW on admission 98#. Per transfer documents from [facility], noted ht of 65in, wt of 98.4#. No edema noted. Reviewed pt's labs, pt admitted w/ acute renal failure. Elevated BUN/Cr and hyperkalemia. If renal function continues to worsen, consider change of tube feeding formula to renal formula, Nepro w/ carb steady. RD to follow-up, will make recommendations pending  hospital course, and will continue to monitor pt's nutrition status. 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 As per paperwork from [facility], patient was noted to have sodium 124, K 7.1, BUN/Cr 216/4.1.  Also noted with WBC 12.7, hgb 10.9.  Sent here for further workup. No mention of any fevers or vomiting from [facility] (vital signs at [facility] were /60  HR 79  RR 18, 98%  T 98.1F).  As In the ED, patient's triage vitals were /81 HR 80  RR 18, 99% on vent.  Labs were significant for WBC 7, hgb 8.7.  Initial BMP showed sodium of 119, BUN/Cr of 259/4.47 with K 6.1.  Also notable were elevated LFTs (AST//104), lactate 2.6, troponin 109.8.  Patient was given insulin 5 units, D50, and kayexelate for the hyperkalemia.  Nephrology was consulted and did not think the patient needed dialysis.  Repeat BMP showed improvement of sodium (123), K 5.5, BUN/Cr 252/4.23).  CT C/A/P showed "patchy groundglass opacities and tree-in-bud nodularity likely related to multifocal infection and distal airways impaction similar when compared with prior study, although increased consolidation volume loss in the right lower lobe when compared with prior examination.  Improvement in small left and trace right pleural effusion.  No hydronephrosis. Undistended bladder, apparent bladder wall thickening, correlate with urinalysis to exclude UTI."     Nutrition consult ordered  for pressure ulcer stage II or >. Pt admitted w/ multiple pressure ulcers; unstageable to sacrum and unstageable to BL 1st toes. Pt admitted from [facility] NH; per transfer documents, pt receiving EN feeds via PEG of Glucerna 1.5 @ 50 ml/hr x 20hr for a total volume of 1000 ml/day, providing pt w/ 1500kcal and 82.5g protein per day. Pt also receiving 400cc water flush 4x day, total 1600cc free water/day. Pt currently receiving Glucerna 1.5 via peg @ 50ml/hr x 24 hrs (this is providing 1200ml total volume of formula, 1800kcal, 99g protein). Pt also receiving bolus free water flushes 200ml q 4 hours. Pt currently tolerating tube feeding well. CBW on admission 98#. Per transfer documents from [facility], noted ht of 65in, wt of 98.4#. No edema noted. Reviewed pt's labs, pt admitted w/ acute renal failure. Elevated BUN/Cr and hyperkalemia. If renal function continues to worsen, consider change of tube feeding formula to renal formula, Nepro w/ carb steady. Recommend MVI and vitamin C supplementation to facilitate wound healing. RD to follow-up, will make recommendations pending  hospital course, and will continue to monitor pt's nutrition status.

## 2022-01-08 NOTE — DIETITIAN INITIAL EVALUATION ADULT. - PERTINENT MEDS FT
MEDICATIONS  (STANDING):  ALBUTerol    90 MICROgram(s) HFA Inhaler 2 Puff(s) Inhalation every 6 hours  dextrose 40% Gel 15 Gram(s) Oral once  dextrose 5%. 1000 milliLiter(s) (50 mL/Hr) IV Continuous <Continuous>  dextrose 5%. 1000 milliLiter(s) (100 mL/Hr) IV Continuous <Continuous>  dextrose 50% Injectable 25 Gram(s) IV Push once  dextrose 50% Injectable 12.5 Gram(s) IV Push once  dextrose 50% Injectable 25 Gram(s) IV Push once  glucagon  Injectable 1 milliGRAM(s) IntraMuscular once  heparin   Injectable 5000 Unit(s) SubCutaneous every 8 hours  insulin lispro (ADMELOG) corrective regimen sliding scale   SubCutaneous every 6 hours  lactobacillus acidophilus 1 Tablet(s) Oral daily  LORazepam     Tablet 1 milliGRAM(s) Oral every 4 hours  methocarbamol 500 milliGRAM(s) Oral two times a day  midodrine 2.5 milliGRAM(s) Oral every 8 hours  pantoprazole   Suspension 40 milliGRAM(s) Oral daily  senna 2 Tablet(s) Oral at bedtime  simethicone 80 milliGRAM(s) Chew every 6 hours    MEDICATIONS  (PRN):  acetaminophen     Tablet .. 650 milliGRAM(s) Oral every 6 hours PRN Mild Pain (1 - 3)  aluminum hydroxide/magnesium hydroxide/simethicone Suspension 30 milliLiter(s) Oral every 4 hours PRN Dyspepsia  melatonin 3 milliGRAM(s) Oral at bedtime PRN Insomnia  ondansetron Injectable 4 milliGRAM(s) IV Push every 8 hours PRN Nausea and/or Vomiting  polyethylene glycol 3350 17 Gram(s) Oral every 12 hours PRN Constipation

## 2022-01-08 NOTE — PROGRESS NOTE ADULT - SUBJECTIVE AND OBJECTIVE BOX
Patient is a 76y old  Female who presents with a chief complaint of abnormal labs (2022 10:09)      INTERVAL HPI/OVERNIGHT EVENTS: Patient seen and examined at bedside. No overnight events.      MEDICATIONS  (STANDING):  ALBUTerol    90 MICROgram(s) HFA Inhaler 2 Puff(s) Inhalation every 6 hours  dextrose 40% Gel 15 Gram(s) Oral once  dextrose 5%. 1000 milliLiter(s) (50 mL/Hr) IV Continuous <Continuous>  dextrose 5%. 1000 milliLiter(s) (100 mL/Hr) IV Continuous <Continuous>  dextrose 50% Injectable 25 Gram(s) IV Push once  dextrose 50% Injectable 12.5 Gram(s) IV Push once  dextrose 50% Injectable 25 Gram(s) IV Push once  glucagon  Injectable 1 milliGRAM(s) IntraMuscular once  heparin   Injectable 5000 Unit(s) SubCutaneous every 8 hours  insulin lispro (ADMELOG) corrective regimen sliding scale   SubCutaneous every 6 hours  lactobacillus acidophilus 1 Tablet(s) Oral daily  LORazepam     Tablet 1 milliGRAM(s) Oral every 4 hours  methocarbamol 500 milliGRAM(s) Oral two times a day  midodrine 2.5 milliGRAM(s) Oral every 8 hours  pantoprazole   Suspension 40 milliGRAM(s) Oral daily  senna 2 Tablet(s) Oral at bedtime  simethicone 80 milliGRAM(s) Chew every 6 hours    MEDICATIONS  (PRN):  acetaminophen     Tablet .. 650 milliGRAM(s) Oral every 6 hours PRN Mild Pain (1 - 3)  aluminum hydroxide/magnesium hydroxide/simethicone Suspension 30 milliLiter(s) Oral every 4 hours PRN Dyspepsia  melatonin 3 milliGRAM(s) Oral at bedtime PRN Insomnia  ondansetron Injectable 4 milliGRAM(s) IV Push every 8 hours PRN Nausea and/or Vomiting  polyethylene glycol 3350 17 Gram(s) Oral every 12 hours PRN Constipation      Allergies    codeine (Hives)    Intolerances        REVIEW OF SYSTEMS:  Unable to obtain meaningful ROS due to mental status      Vital Signs Last 24 Hrs  T(C): 36.4 (2022 08:00), Max: 37 (2022 16:00)  T(F): 97.6 (2022 08:00), Max: 98.6 (2022 16:00)  HR: 86 (2022 11:50) (78 - 90)  BP: 105/48 (2022 10:02) (81/37 - 122/43)  BP(mean): 65 (2022 10:02) (51 - 74)  RR: 15 (2022 10:02) (15 - 26)  SpO2: 98% (2022 11:50) (98% - 100%)    PHYSICAL EXAM:  GENERAL: chronically ill appearing, emaciated, NAD, obtunded  HEAD:  atraumatic  EYES: conjunctiva clear  NECK: +trach in place, clean  RESPIRATORY:  coarse mechanical breath sounds, no gross crackles or rales (+)trach, vent  CARDIOVASCULAR:  tachycardic, regular rate and rhythm, no murmurs or rubs or gallops, 2+ peripheral pulses  GASTROINTESTINAL:  soft, clean and dry as well, mildly distended, PEG site c/d/i  EXTREMITIES: no clubbing or cyanosis or edema  MUSCULOSKELETAL:  +diffuse contractures in all joints  NERVOUS SYSTEM: does not respond to pain  SKIN: necrotic tips of bilateral 1st toes    LABS:                        7.9    5.59  )-----------( 158      ( 2022 07:07 )             25.3     CBC Full  -  ( 2022 07:07 )  WBC Count : 5.59 K/uL  Hemoglobin : 7.9 g/dL  Hematocrit : 25.3 %  Platelet Count - Automated : 158 K/uL  Mean Cell Volume : 82.7 fl  Mean Cell Hemoglobin : 25.8 pg  Mean Cell Hemoglobin Concentration : 31.2 gm/dL  Auto Neutrophil # : x  Auto Lymphocyte # : x  Auto Monocyte # : x  Auto Eosinophil # : x  Auto Basophil # : x  Auto Neutrophil % : x  Auto Lymphocyte % : x  Auto Monocyte % : x  Auto Eosinophil % : x  Auto Basophil % : x    2022 07:07    134    |  93     |  202    ----------------------------<  179    3.9     |  28     |  2.66     Ca    7.8        2022 07:07  Phos  4.8       2022 07:07    TPro  5.8    /  Alb  1.9    /  TBili  0.4    /  DBili  0.1    /  AST  267    /  ALT  134    /  AlkPhos  136    2022 07:07    PT/INR - ( 2022 07:07 )   PT: 11.6 sec;   INR: 0.96 ratio           Urinalysis Basic - ( 2022 04:58 )    Color: Yellow / Appearance: Slightly Turbid / S.010 / pH: x  Gluc: x / Ketone: Negative  / Bili: Negative / Urobili: Negative mg/dL   Blood: x / Protein: 30 mg/dL / Nitrite: Negative   Leuk Esterase: Moderate / RBC: 6-10 /HPF / WBC 11-25   Sq Epi: x / Non Sq Epi: Few / Bacteria: Few      CAPILLARY BLOOD GLUCOSE      POCT Blood Glucose.: 203 mg/dL (2022 05:07)  POCT Blood Glucose.: 281 mg/dL (2022 23:26)  POCT Blood Glucose.: 192 mg/dL (2022 17:14)          RADIOLOGY & ADDITIONAL TESTS: < from: CT Chest No Cont (22 @ 23:39) >  ACC: [id number] EXAM:  CT ABDOMEN AND PELVIS                        ACC: [id number] EXAM:  CT CHEST                          PROCEDURE DATE:  2022          INTERPRETATION:  CLINICAL INFORMATION: Respiratory failure, renal failure    COMPARISON: CT chest, abdomen pelvis 2021    CONTRAST/COMPLICATIONS:  IV Contrast: NONE  Oral Contrast: NONE  Complications: None reported at time of study completion    PROCEDURE:  CT of the Chest, Abdomen and Pelvis was performed.  Sagittal and coronal reformats were performed.    FINDINGS:  CHEST:  LUNGS AND LARGE AIRWAYS: Tracheostomy tube in place, with trace layering   secretions in the proximal trachea. Increased consolidation and volume   loss in the right lower lobe when compared with the prior study. Patchy   bilateral groundglass opacities and centrilobular tree-in-bud nodularity,   similar when compared with the previous examination. Redemonstration of   calcified material in the right lower lobe likely representing aspirated   contents.  PLEURA: Trace right and small left pleural effusions decreased since the   prior study.  VESSELS: Atherosclerotic calcifications of the aorta and coronary   arteries.  HEART: Heart size is normal. Trace pericardial effusion. Mitral annular   calcifications.  MEDIASTINUM AND JHONNY: Improvement in mediastinal and bilateral hilar   lymphadenopathy.  CHEST WALL AND LOWER NECK: Patulous air-filled proximal esophagus.    ABDOMEN AND PELVIS:  LIVER: Enlarged. No focal lesions.  BILE DUCTS: Normal caliber.  GALLBLADDER: Cholelithiasis.  SPLEEN: Top normal in size.  PANCREAS: Within normal limits.  ADRENALS: Nonspecific mild adrenal gland thickening bilaterally.  KIDNEYS/URETERS: Punctate nonobstructing bilateral renal calculi. No   hydronephrosis, significant perinephric inflammation or fluid collection.    BLADDER: Mildly distended, with mild circumferential wall thickening. Few   punctate calculi within the posterior aspect of the bladder.  REPRODUCTIVE ORGANS: No adnexal masses.    BOWEL: Gastrostomy tube terminates in the stomach. Oral contrast is seen   within the stomach and within small and large bowel loops. Slightly   improved rectal dilatation. No bowel obstruction. Appendix is surgically   absent.  PERITONEUM: Trace ascites. No free air.  VESSELS: Atherosclerotic changes.  RETROPERITONEUM/LYMPH NODES: No lymphadenopathy.  ABDOMINAL WALL: Mild diffuse subcutaneous edema.  BONES: Diffuse osteopenia. Stable chronic fracture deformity of the left   femur with surrounding callus. Multilevel degenerative changes of the   spine.    IMPRESSION:  Patchy groundglass opacities and tree-in-bud nodularity likely related to   multifocal infection and distal airways impaction, similar when compared   with the prior study, although increasedconsolidation volume loss in the   right lower lobe when compared with the prior examination. Improvement in   small left and trace right pleural effusions.    No hydronephrosis. Underdistended bladder, apparent bladder wall   thickening, correlate with urinalysis to exclude UTI.        --- End of Report ---            CONCETTA HEWITT MD; Attending Radiologist  This document has been electronically signed. 2022 12:20AM    < end of copied text >        Consultant(s) Notes Reviewed:  [x] YES  [ ] NO    Care Discussed with [x] Consultants  [x] Patient  [ ] Family  [ ]      [ x] Other; RN  DVT ppx

## 2022-01-08 NOTE — CONSULT NOTE ADULT - NSCONSULTADDITIONALINFOA_GEN_ALL_CORE
Diet, NPO:   Tube Feeding Modality: Gastrostomy  Glucerna 1.5 Horacio  Total Volume for 24 Hours (mL): 1200  Continuous  Starting Tube Feed Rate {mL per Hour}: 50  Until Goal Tube Feed Rate (mL per Hour): 50  Tube Feed Duration (in Hours): 24  Tube Feed Start Time: 16:00  Free Water Flush  Bolus   Total Volume per Flush (mL): 200   Frequency: Every 4 Hours (01-07-22 @ 17:17) [Active]

## 2022-01-08 NOTE — PROGRESS NOTE ADULT - ASSESSMENT
HPI:  ***Patient with dementia and is non-verbal.  Unable to provide any HPI or ROS.  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  As per paperwork from [facility], patient was noted to have sodium 124, K 7.1, BUN/Cr 216/4.1.  Also noted with WBC 12.7, hgb 10.9.  Sent here for further workup.  No mention of any fevers or vomiting from [facility] (vital signs at [facility] were /60  HR 79  RR 18, 98%  T 98.1F).  As In the ED, patient's triage vitals were /81 HR 80  RR 18, 99% on vent.  Labs were significant for WBC 7, hgb 8.7.  Initial BMP showed sodium of 119, BUN/Cr of 259/4.47 with K 6.1.  Also notable were elevated LFTs (AST//104), lactate 2.6, troponin 109.8.  Patient was given insulin 5 units, D50, and kayexelate for the hyperkalemia.  Nephrology was consulted and did not think the patient needed dialysis.  Repeat BMP showed improvement of sodium (123), K 5.5, BUN/Cr 252/4.23).  CT C/A/P showed "patchy groundglass opacities and tree-in-bud nodularity likely related to multifocal infection and distal airways impaction similar when compared with prior study, although increased consolidation volume loss in the right lower lobe when compared with prior examination.  Improvement in small left and trace right pleural effusion.  No hydronephrosis.  Undistended bladder, apparent bladder wall thickening, correlate with urinalysis to exclude UTI."   (07 Jan 2022 01:07)          hyponatremia please not to correct sodium not more than 6 point in 24 hr , please use d5w to prevent fast correction   will check ua , urine osmolality , urine sodium , urine uric acid , serum sodium , serum osmolality , serum uric acid , f/u with hyponatremia work up , f/u with bmp , monitor i and o      hyperkalemia will give Kayexalate      ACUTE RENAL FAILURE:   Serum creatinine is  at     , approximating GFR at   ml/min.   There is no progression . No uremic symptoms  No evidence of anemia .  Fluid status stable.  Will continue to avoid nephrotoxic drugs.  Patient remains asymptomatic.   Continue current therapy.  hold  diuretic.  hold   ACE inhibitor.  hold   ARB.  Additional evaluation:   ECG,    echocardiogram,     CXR,  will obtained recent   renal ultrasound to evalaute kidney size and possible stones ,    ryan atn   ns 50 cc per hr

## 2022-01-08 NOTE — CONSULT NOTE ADULT - PROVIDER SPECIALTY LIST ADULT
Critical Care
Critical Care
Gastroenterology
Nephrology
Cardiology
Infectious Disease
Palliative Care
Wound Care

## 2022-01-08 NOTE — CONSULT NOTE ADULT - CONSULT REASON
manasa , hyperkalemia
RYAN
mosf
Elevated cardiac enzymes, respiratory failure
abnormal labs
GOC
Elevated liver enzymes
Wound Consult

## 2022-01-08 NOTE — CONSULT NOTE ADULT - ASSESSMENT
1. Left Hallux 4 x3.5 unstageable pressure injury no drainage, no odor, no induration, no fluctuance, periwound non-blanchable erythema  Recommend:   -Betadine daily  2. Right Hallux 3 x2.5 unstageable pressure injury no drainage, no odor, no induration, no fluctuance, periwound non-blanchable erythema  Recommend:   -Betadine daily  3. Sacral region deep tissue pressure injury (DTPI)   5 x 3 periwound with non-blanchable  erythema 5 x 3.  recommend:   - triad BID and PRN    Continue  Nutrition (as tolerated)  Continue  Offloading   Continue Pericare  Apply cair boots at all times while in bed.   Provide skin checks and foot placement q8h.  Care as per medicine will follow w/ you  [facility]   Findings and recommendations discussed with BRANDEN Hooker  Thank you for this consult  Ana Luisa Cantu NP, [facility] [phone]

## 2022-01-09 ENCOUNTER — TRANSCRIPTION ENCOUNTER (OUTPATIENT)
Age: 77
End: 2022-01-09

## 2022-01-09 LAB
ALBUMIN SERPL ELPH-MCNC: 2.1 G/DL — LOW (ref 3.3–5)
ALP SERPL-CCNC: 179 U/L — HIGH (ref 30–120)
ALT FLD-CCNC: 126 U/L DA — HIGH (ref 10–60)
ANION GAP SERPL CALC-SCNC: 13 MMOL/L — SIGNIFICANT CHANGE UP (ref 5–17)
ANION GAP SERPL CALC-SCNC: 7 MMOL/L — SIGNIFICANT CHANGE UP (ref 5–17)
AST SERPL-CCNC: 184 U/L — HIGH (ref 10–40)
BASOPHILS # BLD AUTO: 0.02 K/UL — SIGNIFICANT CHANGE UP (ref 0–0.2)
BASOPHILS NFR BLD AUTO: 0.3 % — SIGNIFICANT CHANGE UP (ref 0–2)
BILIRUB SERPL-MCNC: 0.3 MG/DL — SIGNIFICANT CHANGE UP (ref 0.2–1.2)
BUN SERPL-MCNC: 151 MG/DL — HIGH (ref 7–23)
BUN SERPL-MCNC: 162 MG/DL — HIGH (ref 7–23)
CALCIUM SERPL-MCNC: 7.7 MG/DL — LOW (ref 8.4–10.5)
CALCIUM SERPL-MCNC: 7.9 MG/DL — LOW (ref 8.4–10.5)
CHLORIDE SERPL-SCNC: 94 MMOL/L — LOW (ref 96–108)
CHLORIDE SERPL-SCNC: 96 MMOL/L — SIGNIFICANT CHANGE UP (ref 96–108)
CO2 SERPL-SCNC: 27 MMOL/L — SIGNIFICANT CHANGE UP (ref 22–31)
CO2 SERPL-SCNC: 31 MMOL/L — SIGNIFICANT CHANGE UP (ref 22–31)
CREAT SERPL-MCNC: 1.67 MG/DL — HIGH (ref 0.5–1.3)
CREAT SERPL-MCNC: 1.94 MG/DL — HIGH (ref 0.5–1.3)
EOSINOPHIL # BLD AUTO: 0.11 K/UL — SIGNIFICANT CHANGE UP (ref 0–0.5)
EOSINOPHIL NFR BLD AUTO: 1.8 % — SIGNIFICANT CHANGE UP (ref 0–6)
GLUCOSE SERPL-MCNC: 179 MG/DL — HIGH (ref 70–99)
GLUCOSE SERPL-MCNC: 338 MG/DL — HIGH (ref 70–99)
HCT VFR BLD CALC: 29 % — LOW (ref 34.5–45)
HGB BLD-MCNC: 8.8 G/DL — LOW (ref 11.5–15.5)
IMM GRANULOCYTES NFR BLD AUTO: 1.1 % — SIGNIFICANT CHANGE UP (ref 0–1.5)
LACTATE SERPL-SCNC: 2.2 MMOL/L — HIGH (ref 0.7–2)
LYMPHOCYTES # BLD AUTO: 1 K/UL — SIGNIFICANT CHANGE UP (ref 1–3.3)
LYMPHOCYTES # BLD AUTO: 16.3 % — SIGNIFICANT CHANGE UP (ref 13–44)
MCHC RBC-ENTMCNC: 25.1 PG — LOW (ref 27–34)
MCHC RBC-ENTMCNC: 30.3 GM/DL — LOW (ref 32–36)
MCV RBC AUTO: 82.9 FL — SIGNIFICANT CHANGE UP (ref 80–100)
MONOCYTES # BLD AUTO: 0.48 K/UL — SIGNIFICANT CHANGE UP (ref 0–0.9)
MONOCYTES NFR BLD AUTO: 7.8 % — SIGNIFICANT CHANGE UP (ref 2–14)
NEUTROPHILS # BLD AUTO: 4.46 K/UL — SIGNIFICANT CHANGE UP (ref 1.8–7.4)
NEUTROPHILS NFR BLD AUTO: 72.7 % — SIGNIFICANT CHANGE UP (ref 43–77)
NRBC # BLD: 0 /100 WBCS — SIGNIFICANT CHANGE UP (ref 0–0)
PLATELET # BLD AUTO: 155 K/UL — SIGNIFICANT CHANGE UP (ref 150–400)
POTASSIUM SERPL-MCNC: 3.2 MMOL/L — LOW (ref 3.5–5.3)
POTASSIUM SERPL-MCNC: 4.6 MMOL/L — SIGNIFICANT CHANGE UP (ref 3.5–5.3)
POTASSIUM SERPL-SCNC: 3.2 MMOL/L — LOW (ref 3.5–5.3)
POTASSIUM SERPL-SCNC: 4.6 MMOL/L — SIGNIFICANT CHANGE UP (ref 3.5–5.3)
PROT SERPL-MCNC: 6.2 G/DL — SIGNIFICANT CHANGE UP (ref 6–8.3)
RBC # BLD: 3.5 M/UL — LOW (ref 3.8–5.2)
RBC # FLD: 15.5 % — HIGH (ref 10.3–14.5)
SODIUM SERPL-SCNC: 132 MMOL/L — LOW (ref 135–145)
SODIUM SERPL-SCNC: 136 MMOL/L — SIGNIFICANT CHANGE UP (ref 135–145)
WBC # BLD: 6.14 K/UL — SIGNIFICANT CHANGE UP (ref 3.8–10.5)
WBC # FLD AUTO: 6.14 K/UL — SIGNIFICANT CHANGE UP (ref 3.8–10.5)

## 2022-01-09 PROCEDURE — 99233 SBSQ HOSP IP/OBS HIGH 50: CPT

## 2022-01-09 RX ORDER — POVIDONE-IODINE 5 %
1 AEROSOL (ML) TOPICAL
Qty: 0 | Refills: 0 | DISCHARGE
Start: 2022-01-09

## 2022-01-09 RX ADMIN — MIDODRINE HYDROCHLORIDE 2.5 MILLIGRAM(S): 2.5 TABLET ORAL at 05:21

## 2022-01-09 RX ADMIN — Medication 1 MILLIGRAM(S): at 09:26

## 2022-01-09 RX ADMIN — Medication 1 MILLIGRAM(S): at 05:21

## 2022-01-09 RX ADMIN — ALBUTEROL 2 PUFF(S): 90 AEROSOL, METERED ORAL at 13:56

## 2022-01-09 RX ADMIN — Medication 1 MILLIGRAM(S): at 13:22

## 2022-01-09 RX ADMIN — PANTOPRAZOLE SODIUM 40 MILLIGRAM(S): 20 TABLET, DELAYED RELEASE ORAL at 12:21

## 2022-01-09 RX ADMIN — MIDODRINE HYDROCHLORIDE 2.5 MILLIGRAM(S): 2.5 TABLET ORAL at 13:22

## 2022-01-09 RX ADMIN — SIMETHICONE 80 MILLIGRAM(S): 80 TABLET, CHEWABLE ORAL at 17:36

## 2022-01-09 RX ADMIN — MIDODRINE HYDROCHLORIDE 2.5 MILLIGRAM(S): 2.5 TABLET ORAL at 22:19

## 2022-01-09 RX ADMIN — Medication 1 MILLIGRAM(S): at 22:19

## 2022-01-09 RX ADMIN — SIMETHICONE 80 MILLIGRAM(S): 80 TABLET, CHEWABLE ORAL at 00:24

## 2022-01-09 RX ADMIN — Medication 1 MILLIGRAM(S): at 01:59

## 2022-01-09 RX ADMIN — HEPARIN SODIUM 5000 UNIT(S): 5000 INJECTION INTRAVENOUS; SUBCUTANEOUS at 22:18

## 2022-01-09 RX ADMIN — HEPARIN SODIUM 5000 UNIT(S): 5000 INJECTION INTRAVENOUS; SUBCUTANEOUS at 05:21

## 2022-01-09 RX ADMIN — HEPARIN SODIUM 5000 UNIT(S): 5000 INJECTION INTRAVENOUS; SUBCUTANEOUS at 13:22

## 2022-01-09 RX ADMIN — Medication 10: at 00:24

## 2022-01-09 RX ADMIN — Medication 1 TABLET(S): at 12:21

## 2022-01-09 RX ADMIN — METHOCARBAMOL 500 MILLIGRAM(S): 500 TABLET, FILM COATED ORAL at 05:20

## 2022-01-09 RX ADMIN — METHOCARBAMOL 500 MILLIGRAM(S): 500 TABLET, FILM COATED ORAL at 17:36

## 2022-01-09 RX ADMIN — Medication 4: at 17:46

## 2022-01-09 RX ADMIN — SIMETHICONE 80 MILLIGRAM(S): 80 TABLET, CHEWABLE ORAL at 12:21

## 2022-01-09 RX ADMIN — ALBUTEROL 2 PUFF(S): 90 AEROSOL, METERED ORAL at 19:59

## 2022-01-09 RX ADMIN — SIMETHICONE 80 MILLIGRAM(S): 80 TABLET, CHEWABLE ORAL at 05:21

## 2022-01-09 RX ADMIN — Medication 1 APPLICATION(S): at 12:21

## 2022-01-09 RX ADMIN — ALBUTEROL 2 PUFF(S): 90 AEROSOL, METERED ORAL at 01:19

## 2022-01-09 RX ADMIN — Medication 4: at 12:21

## 2022-01-09 RX ADMIN — ALBUTEROL 2 PUFF(S): 90 AEROSOL, METERED ORAL at 08:59

## 2022-01-09 RX ADMIN — SENNA PLUS 2 TABLET(S): 8.6 TABLET ORAL at 22:19

## 2022-01-09 RX ADMIN — Medication 4: at 05:21

## 2022-01-09 RX ADMIN — Medication 1 MILLIGRAM(S): at 17:36

## 2022-01-09 NOTE — DISCHARGE NOTE PROVIDER - NSDCFUADDINST_GEN_ALL_CORE_FT
- Please make an appointment for follow up with your primary doctor within 1-3 days of discharge  - It is important to see your primary physician as well as the physicians listed below to perform a comprehensive medical review.  Call their offices for an appointment as soon as you leave the hospital.  You will also need to see them for renewal of your medications.  If you do not have a primary physician, contact the [facility] Physician Referral Service at [phone].  To obtain your results, you can access the [facility] Patient Portal at [email or web address].   - Your medical issues appear to be stable at this time, but if your symptoms recur or worsen, contact your physicians and/or return to the hospital if necessary.    -If you encounter any issues or questions with your medication, call your physicians before stopping the medication.    - Limit your diet to 2 grams of sodium daily.  - Patient or caretaker is responsible for making all appointments

## 2022-01-09 NOTE — DISCHARGE NOTE PROVIDER - ATTENDING DISCHARGE PHYSICAL EXAMINATION:
PHYSICAL EXAM:  GENERAL: chronically ill appearing, emaciated, NAD, obtunded  HEAD:  atraumatic  EYES: conjunctiva clear  NECK: +trach in place, clean  RESPIRATORY:  coarse mechanical breath sounds, no gross crackles or rales (+)trach, vent  CARDIOVASCULAR:  regular rate and rhythm, no murmurs or rubs or gallops, 2+ peripheral pulses  GASTROINTESTINAL:  soft, clean and dry as well, mildly distended, PEG site c/d/i  EXTREMITIES: no clubbing or cyanosis or edema  MUSCULOSKELETAL:  +diffuse contractures in all joints  NERVOUS SYSTEM: does not respond to pain  SKIN: necrotic tips of bilateral 1st toes, sacral pressure injury present

## 2022-01-09 NOTE — PROGRESS NOTE ADULT - SUBJECTIVE AND OBJECTIVE BOX
Patient is a 76y Female whom presented to the hospital with manasa    PAST MEDICAL & SURGICAL HISTORY:  Dementia of frontal lobe type    Aphasic stroke    Diabetes mellitus    Respiratory failure    Hypertension    GERD (gastroesophageal reflux disease)    Constipation    Respiratory failure    CVA (cerebral vascular accident)    HTN (hypertension)    DM (diabetes mellitus)    Advanced dementia    COVID-19 virus detected    Quadriplegia    Pneumonia    Type II diabetes mellitus    Hx of appendectomy    Gastrostomy in place    Tracheostomy in place    Tracheostomy tube present    Feeding by G-tube        MEDICATIONS  (STANDING):  ALBUTerol    90 MICROgram(s) HFA Inhaler 2 Puff(s) Inhalation every 6 hours  dextrose 40% Gel 15 Gram(s) Oral once  dextrose 5%. 1000 milliLiter(s) (50 mL/Hr) IV Continuous <Continuous>  dextrose 5%. 1000 milliLiter(s) (100 mL/Hr) IV Continuous <Continuous>  dextrose 50% Injectable 25 Gram(s) IV Push once  dextrose 50% Injectable 12.5 Gram(s) IV Push once  dextrose 50% Injectable 25 Gram(s) IV Push once  glucagon  Injectable 1 milliGRAM(s) IntraMuscular once  heparin   Injectable 5000 Unit(s) SubCutaneous every 8 hours  insulin lispro (ADMELOG) corrective regimen sliding scale   SubCutaneous every 6 hours  lactobacillus acidophilus 1 Tablet(s) Oral daily  LORazepam     Tablet 1 milliGRAM(s) Oral every 4 hours  methocarbamol 500 milliGRAM(s) Oral two times a day  midodrine 2.5 milliGRAM(s) Oral every 8 hours  pantoprazole   Suspension 40 milliGRAM(s) Oral daily  senna 2 Tablet(s) Oral at bedtime  simethicone 80 milliGRAM(s) Chew every 6 hours      Allergies    codeine (Hives)    Intolerances        SOCIAL HISTORY:  Denies ETOh,Smoking,     FAMILY HISTORY:  No pertinent family history in first degree relatives        REVIEW OF SYSTEMS:    unable to obtained a good review system                                                  8.8    6.14  )-----------( 155      ( 09 Jan 2022 07:53 )             29.0       CBC Full  -  ( 09 Jan 2022 07:53 )  WBC Count : 6.14 K/uL  RBC Count : 3.50 M/uL  Hemoglobin : 8.8 g/dL  Hematocrit : 29.0 %  Platelet Count - Automated : 155 K/uL  Mean Cell Volume : 82.9 fl  Mean Cell Hemoglobin : 25.1 pg  Mean Cell Hemoglobin Concentration : 30.3 gm/dL  Auto Neutrophil # : 4.46 K/uL  Auto Lymphocyte # : 1.00 K/uL  Auto Monocyte # : 0.48 K/uL  Auto Eosinophil # : 0.11 K/uL  Auto Basophil # : 0.02 K/uL  Auto Neutrophil % : 72.7 %  Auto Lymphocyte % : 16.3 %  Auto Monocyte % : 7.8 %  Auto Eosinophil % : 1.8 %  Auto Basophil % : 0.3 %      01-09    136  |  96  |  151<H>  ----------------------------<  179<H>  4.6   |  27  |  1.67<H>    Ca    7.7<L>      09 Jan 2022 07:53  Phos  4.8     01-08    TPro  6.2  /  Alb  2.1<L>  /  TBili  0.3  /  DBili  x   /  AST  184<H>  /  ALT  126<H>  /  AlkPhos  179<H>  01-09      CAPILLARY BLOOD GLUCOSE      POCT Blood Glucose.: 241 mg/dL (09 Jan 2022 12:14)  POCT Blood Glucose.: 208 mg/dL (09 Jan 2022 05:19)  POCT Blood Glucose.: 354 mg/dL (09 Jan 2022 00:20)  POCT Blood Glucose.: 284 mg/dL (08 Jan 2022 17:25)      Vital Signs Last 24 Hrs  T(C): 36.5 (09 Jan 2022 04:00), Max: 36.6 (08 Jan 2022 21:00)  T(F): 97.7 (09 Jan 2022 04:00), Max: 97.9 (08 Jan 2022 21:00)  HR: 78 (09 Jan 2022 14:00) (65 - 84)  BP: 97/59 (09 Jan 2022 14:00) (91/38 - 112/47)  BP(mean): 71 (09 Jan 2022 14:00) (55 - 75)  RR: 15 (09 Jan 2022 14:00) (14 - 26)  SpO2: 100% (09 Jan 2022 14:00) (99% - 110%)        PT/INR - ( 08 Jan 2022 07:07 )   PT: 11.6 sec;   INR: 0.96 ratio             PHYSICAL EXAM:    Constitutional: NAD  HEENT: conjunctive   clear   Neck:  No JVD  Respiratory: decrease bs b/l   Cardiovascular: S1 and S2  Gastrointestinal: BS+, soft, pos peg   Extremities: No peripheral edema

## 2022-01-09 NOTE — PROGRESS NOTE ADULT - ASSESSMENT
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       Acute renal failure   - possibly 2/2 ATN, no hydronephrosis seen on CT  - Improving   - in SPCU 2/2 vent status  - avoid nephrotoxic meds as much as possible  - as per nephrology -> dialysis not needed at this time  - dose meds renally    Hyponatremia    - Improving   discussed with nephro, will continue to monitor    Hyperkalemia  - given kayexelate on admission  - monitor with BMP    Elevated LFTs   - unclear etiology, possibly from dehydration?, CT does not point to any specific etiology  - Improving   - monitor LFTs  - GI following     Elevated troponins   - likely 2/2 renal failure  - downtrending  - monitor telemetry  - cardiology consult appreciated    Chronic resp failure/COPD  - maintain vent settings, goal of SaO2 >95%  - cont with nebs  - pulmonary following     Elevated lactate  - would not start on any abx at this time (no leukocytosis, no fevers, CT not that impressive compared to previous CT)  - repeat lactate downtrending  - f/u BCX and UCXR      Dementia/Restlessness  - continue with ativan 1mg q4h standing  - palliative care following    DM2   - was on lantus 36units at [facility] but during previous admissions, she was only on coverage scale, requiring minimal coverage at times  - therefore will hold lantus for now and start mod dose iss q6h while on tube feeds  - on Glucerna 1.5     Advanced Illness, Protein-Calorie Malnutrition  - Nutrition consult  - turn and position q2    Preventive measures  - heparin for DVT ppx    Updated  over phone today. 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       Acute renal failure   - possibly 2/2 ATN, no hydronephrosis seen on CT  - Improving   - in SPCU 2/2 vent status  - avoid nephrotoxic meds as much as possible  - as per nephrology -> dialysis not needed at this time  - dose meds renally    Hyponatremia    - Improving   discussed with nephro, will continue to monitor for now     Hyperkalemia  - given kayexelate on admission  - monitor with BMP    Elevated LFTs   - unclear etiology, possibly from dehydration?, CT does not point to any specific etiology  - Improving   - monitor LFTs  - GI following     Elevated troponins   - likely 2/2 renal failure  - downtrending  - monitor telemetry  - cardiology consult appreciated    Chronic resp failure/COPD  - maintain vent settings, goal of SaO2 >95%  - cont with nebs  - pulmonary following     Elevated lactate  - would not start on any abx at this time (no leukocytosis, no fevers, CT not that impressive compared to previous CT)  - repeat lactate downtrending  - BCX and Ucxr- NGTD     Pressure ulcer   Sacral region deep tissue pressure injury (DTPI)   5 x 3 periwound with non-blanchable  erythema 5 x 3 per wound care RN  Wound care reccs appreciated    Dementia/Restlessness  - continue with ativan 1mg q4h standing  - palliative care following    DM2   - was on lantus 36units at [facility] but during previous admissions, she was only on coverage scale, requiring minimal coverage at times  - therefore will hold lantus for now and start mod dose iss q6h while on tube feeds  - on Glucerna 1.5     Advanced Illness, Protein-Calorie Malnutrition  - Nutrition consult  - turn and position q2    Preventive measures  - heparin for DVT ppx    Updated  over phone today. All questions answered to the best of my ability   PT is FULL CODE.

## 2022-01-09 NOTE — PROGRESS NOTE ADULT - ASSESSMENT
PARASHARAMI BREA 75 f [facility] S 1/6/2022   DR ILIANA KEMP     REVIEW OF SYMPTOMS      Able to give (reliable) ROS  NO     PHYSICAL EXAM    HEENT Unremarkable  atraumatic   RESP Fair air entry EXP prolonged    Harsh breath sound Resp distres mild   CARDIAC S1 S2 No S3     NO JVD    ABDOMEN SOFT BS PRESENT NOT DISTENDED No hepatosplenomegaly   PEDAL EDEMA present No calf tenderness  NO rash     ______  DOA/CC.  1/6/2022 76 f ho anoxic encephalopathy functional quad trach peg sent from [facility] 1/6/2022 for multiple abn labs ryan   _____                            PROBLEMS POA.  Lacticemia poa 1/6/2022 la 2.6   VDRF Vent dependent resp failure pmh  MI poa Tr 1/6/2022 Tr 109   CHF poa bnp 1/6/2022 bnp 8683   Hyponatremia poa 1/6/2022 Na 119   Hyperkalemia poa 1/6/2022 K 6.1   RYAN poa 1/6/2022 Cr 4.4   ELEVATED LFTS poa 1/6/2022           Rhabdo poa     pmh VDRF sp trach poa   pmh PEG  pmh spasms  pmh anoxic encephalopathy    _____                            COVID STATUS. scv2 1/6/2022 (-)  ICU STAY.1/6/2022   GOC.1/6/2022 full code         BEST PRACTICE ISSUES.                                                  HEAD OF BED ELEVATION. Yes  DVT PROPHYLAXIS.    1/7/2022 hpsc   SQUIRES PROPHYLAXIS.        1/7/2022 protonix                                                                                 DIET.          1/7/2022 glucerna 1.5 1200    INFECTION PROPHYLAXIS.    SPEECH SWALLOW RECOMMENDATIONS.     PATIENT DATA   VITALS/PO/IO/VENT/DRIPS.    1/9/2022 84 100/50   1/9/2022 u 1325  1/9/2022 ac 18/400/5/.4     ASSESSMENT/RECOMMENDATIONS.    HEMODYNAMICS.   Monitor bp Target MAP 65 (+)  Echo 9/8/2021 ECHO n lvsf mild dd pasp 51   1/6/2022 /80   1/7/2022 88/43   1/7/2022 midodrine 2.5 x 3     RESP.   Monitor po Target po 90-95%    OXYGEN REQUIREMENTS.   O2 1/7/2022 O2 0.5     VENT MANAGEMENT.   Vent dependent resp failure pmh  HOB elevation  Target Pplat 35 (-)  Target PO 90-95%  Target pH 730 (+)    INFECTION.   Pt was seen 1/7 by ID Dr Ky Milner deferred    Lacticemia poa   la 1/6-1/7-1/8-1/9/2022 la 2.6 - 2.8 - 2.9- 2.2   a/r   monitor serially    COPD   1/7 albuterol       MI poa   Tr 1/6-1/7/2022 Tr 109 - 76   Monior  elevated trop in setting of ryan may not mean acs    CHF poa   bnp 1/6/2022 bnp 8683       ANEMIA.   Hb 1/7-1/8-1/9/2022 Hb 9.1 - 7.9 - 8.8      ELEVATED LFTS poa  LFTS  1/6-1/7-1/8-1/9/2022    - 135-136- 179   - 429-267 - 184   - 137 - 134 - 126     RO VTE.  V duplx 12/16 v duplx (-)        Hyponatremia poa   Na 1/6-1/7-1/8-1/9/2022 Na 119 - 127 - 134 - 136  1/8 free water 250.4     RHABDO   CK 1/7-1/8/2022 ck 8440- 4649  ua 1/7/2022 w 11-25 few bact r 6-10 blod lg     RYAN poa   Cr 1/6-1/7-1/8-1/9/2022 Cr 4.4 - 3.9 - 2.6 - 1.6   1/8/2022 Nonoliguric     OVERALL ISSUES/PLAN.  HYPONATREMIA Avoid rapid flux   RYAN monitor serially vaughn  HYPORTENSION Midodrine   SEPSIS ID consulted         TIME SPENT   Over 36 minutes aggregate critical care time spent on encounter; activities included   direct patient care, counseling and/or coordinating care reviewing notes, lab data/ imaging , discussion with multidisciplinary team/ patient  /family and explaining in detail risks, benefits, alternatives  of the recommendations     CHAPINCITO GUIDRY 75 f [facility] S 1/6/2022   DR ILIANA KEMP

## 2022-01-09 NOTE — PROGRESS NOTE ADULT - ASSESSMENT
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  As per paperwork from [facility], patient was noted to have sodium 124, K 7.1, BUN/Cr 216/4.1.  Also noted with WBC 12.7, hgb 10.9.  Sent here for further workup.  No mention of any fevers or vomiting from [facility] (vital signs at [facility] were /60  HR 79  RR 18, 98%  T 98.1F).  As In the ED, patient's triage vitals were /81 HR 80  RR 18, 99% on vent.  Labs were significant for WBC 7, hgb 8.7.  Initial BMP showed sodium of 119, BUN/Cr of 259/4.47 with K 6.1.  Also notable were elevated LFTs (AST//104), lactate 2.6, troponin 109.8.  Patient was given insulin 5 units, D50, and kayexelate for the hyperkalemia.  Nephrology was consulted and did not think the patient needed dialysis.  Repeat BMP showed improvement of sodium (123), K 5.5, BUN/Cr 252/4.23).  CT C/A/P showed "patchy groundglass opacities and tree-in-bud nodularity likely related to multifocal infection and distal airways impaction similar when compared with prior study, although increased consolidation volume loss in the right lower lobe when compared with prior examination.  Improvement in small left and trace right pleural effusion.  No hydronephrosis.  Undistended bladder, apparent bladder wall thickening, correlate with urinalysis to exclude UTI."   (07 Jan 2022 01:07)          hyponatremia please not to correct sodium not more than 6 point in 24 hr , please use d5w to prevent fast correction   will check ua , urine osmolality , urine sodium , urine uric acid , serum sodium , serum osmolality , serum uric acid , f/u with hyponatremia work up , f/u with bmp , monitor i and o      hyperkalemia will give Kayexalate  prn     ACUTE RENAL FAILURE:   Serum creatinine is  is improving   There is no progression . No uremic symptoms  No evidence of anemia .  Fluid status stable.  Will continue to avoid nephrotoxic drugs.  Patient remains asymptomatic.   Continue current therapy.  hold  diuretic.  hold   ACE inhibitor.  hold   ARB.

## 2022-01-09 NOTE — PROGRESS NOTE ADULT - SUBJECTIVE AND OBJECTIVE BOX
Neurology follow up note    BREA [id number]      Interval History:    Patient resting in bed     Allergies    codeine (Hives)    Intolerances        MEDICATIONS    acetaminophen     Tablet .. 650 milliGRAM(s) Oral every 6 hours PRN  ALBUTerol    90 MICROgram(s) HFA Inhaler 2 Puff(s) Inhalation every 6 hours  aluminum hydroxide/magnesium hydroxide/simethicone Suspension 30 milliLiter(s) Oral every 4 hours PRN  dextrose 40% Gel 15 Gram(s) Oral once  dextrose 5%. 1000 milliLiter(s) IV Continuous <Continuous>  dextrose 5%. 1000 milliLiter(s) IV Continuous <Continuous>  dextrose 50% Injectable 25 Gram(s) IV Push once  dextrose 50% Injectable 12.5 Gram(s) IV Push once  dextrose 50% Injectable 25 Gram(s) IV Push once  glucagon  Injectable 1 milliGRAM(s) IntraMuscular once  heparin   Injectable 5000 Unit(s) SubCutaneous every 8 hours  insulin lispro (ADMELOG) corrective regimen sliding scale   SubCutaneous every 6 hours  lactobacillus acidophilus 1 Tablet(s) Oral daily  LORazepam     Tablet 1 milliGRAM(s) Oral every 4 hours  melatonin 3 milliGRAM(s) Oral at bedtime PRN  methocarbamol 500 milliGRAM(s) Oral two times a day  midodrine 2.5 milliGRAM(s) Oral every 8 hours  ondansetron Injectable 4 milliGRAM(s) IV Push every 8 hours PRN  pantoprazole   Suspension 40 milliGRAM(s) Oral daily  polyethylene glycol 3350 17 Gram(s) Oral every 12 hours PRN  povidone iodine 10% Solution 1 Application(s) Topical daily  senna 2 Tablet(s) Oral at bedtime  simethicone 80 milliGRAM(s) Chew every 6 hours              Vital Signs Last 24 Hrs  T(C): 36.5 (09 Jan 2022 04:00), Max: 36.6 (08 Jan 2022 21:00)  T(F): 97.7 (09 Jan 2022 04:00), Max: 97.9 (08 Jan 2022 21:00)  HR: 78 (09 Jan 2022 14:00) (65 - 86)  BP: 97/59 (09 Jan 2022 14:00) (91/38 - 112/47)  BP(mean): 71 (09 Jan 2022 14:00) (55 - 75)  RR: 15 (09 Jan 2022 14:00) (14 - 26)  SpO2: 100% (09 Jan 2022 14:00) (98% - 110%)      REVIEW OF SYSTEMS:  Could not be obtained secondary to the patient being nonverbal.    PHYSICAL EXAMINATION:    HEENT:  Head:  Normocephalic.  Eyes:  No scleral icterus.  Ears:  Hard to evaluate secondary to the patient being nonverbal.  NECK:  Had increased tone, tracheostomy was in place.  RESPIRATORY:  Decreased breath sounds bilaterally.  CARDIOVASCULAR:  S1 and S2 heard.  ABDOMEN:  Soft and nontender.  EXTREMITIES:  No clubbing or cyanosis was noted.      NEUROLOGIC:  The patient was resting in bed with eyes open.  Upon stimulation of the patient, her eyes would roll up,   Speech:  Nonverbal.  Tone in all four extremities increased.  Bilateral upper extremities were in a flexed position.  Bilateral lower extremities were in the straight position.                  LABS:  CBC Full  -  ( 09 Jan 2022 07:53 )  WBC Count : 6.14 K/uL  RBC Count : 3.50 M/uL  Hemoglobin : 8.8 g/dL  Hematocrit : 29.0 %  Platelet Count - Automated : 155 K/uL  Mean Cell Volume : 82.9 fl  Mean Cell Hemoglobin : 25.1 pg  Mean Cell Hemoglobin Concentration : 30.3 gm/dL  Auto Neutrophil # : 4.46 K/uL  Auto Lymphocyte # : 1.00 K/uL  Auto Monocyte # : 0.48 K/uL  Auto Eosinophil # : 0.11 K/uL  Auto Basophil # : 0.02 K/uL  Auto Neutrophil % : 72.7 %  Auto Lymphocyte % : 16.3 %  Auto Monocyte % : 7.8 %  Auto Eosinophil % : 1.8 %  Auto Basophil % : 0.3 %      01-09    136  |  96  |  151<H>  ----------------------------<  179<H>  4.6   |  27  |  1.67<H>    Ca    7.7<L>      09 Jan 2022 07:53  Phos  4.8     01-08    TPro  6.2  /  Alb  2.1<L>  /  TBili  0.3  /  DBili  x   /  AST  184<H>  /  ALT  126<H>  /  AlkPhos  179<H>  01-09    Hemoglobin A1C:     LIVER FUNCTIONS - ( 09 Jan 2022 07:53 )  Alb: 2.1 g/dL / Pro: 6.2 g/dL / ALK PHOS: 179 U/L / ALT: 126 U/L DA / AST: 184 U/L / GGT: x           Vitamin B12   PT/INR - ( 08 Jan 2022 07:07 )   PT: 11.6 sec;   INR: 0.96 ratio               RADIOLOGY    ANALYSIS AND PLAN:  This is a 76-year-old with history of abnormal movements and altered mental status.  For altered mental status, most likely metabolic encephalopathy secondary to underlying renal insufficiency.  For history of abnormal movements, the patient has been evaluated in the past for this, deemed nonepileptic as per my conversation with spouse, who refused EEG monitoring, did not want any antiseizure medications.  The patient was tried on muscle relaxants in the past, but that does not seem to help with her tone and may have a paradoxical effect of lowering blood pressure.  For now, I will recommend continuing her current medications.  For history of diabetes, strict control of blood sugars.  The patient's spouse's name is Marciano, telephone number is [phone].  Greater than 40 minutes of time was spent with the patient, plan of care, reviewing data, and speaking to multidisciplinary healthcare team.    Thank you for the courtesy of this consultation.

## 2022-01-09 NOTE — DISCHARGE NOTE PROVIDER - NSDCMRMEDTOKEN_GEN_ALL_CORE_FT
albuterol 90 mcg/inh inhalation aerosol: 2 puff(s) inhaled every 6 hours  Bacid (LAC) oral tablet: 2 tab(s) by gastrostomy tube once a day  Carafate 1 g/10 mL oral suspension: 10 milliliter(s) by gastrostomy tube 4 times a day (before meals and at bedtime) for 14 days (Started 6/4/21)  chlorhexidine 0.12% mucous membrane liquid: 15 milliliter(s) mucous membrane 2 times a day  insulin lispro 100 units/mL injectable solution: injectable 3 times a day ; sliding scale coverage   ipratropium-albuterol 0.5 mg-2.5 mg/3 mL inhalation solution: 3 milliliter(s) inhaled 4 times a day  LORazepam 1 mg oral tablet: 1 tab(s) orally every 4 hours  methocarbamol 500 mg oral tablet: 1 tab(s) orally 2 times a day  pantoprazole 40 mg oral granule, delayed release: 40 milligram(s) by gastrostomy tube 2 times a day  polyethylene glycol 3350 oral powder for reconstitution: 17 gram(s) orally every 12 hours  povidone iodine 10% topical solution: 1 application topically once a day  ProAir HFA 90 mcg/inh inhalation aerosol: 2 puff(s) inhaled every 6 hours  senna 8.6 mg oral tablet: 3 tab(s) by gastrostomy tube once a day (at bedtime)  simethicone 80 mg oral tablet, chewable: 1 tab(s) orally every 6 hours  Tylenol 325 mg oral tablet: 2 tab(s) by gastrostomy tube once a day; 60 minutes prior to dressing change

## 2022-01-09 NOTE — PROGRESS NOTE ADULT - SUBJECTIVE AND OBJECTIVE BOX
BREA BECKHAM    [facility]    Allergies    codeine (Hives)    Intolerances        PAST MEDICAL & SURGICAL HISTORY:  Dementia of frontal lobe type    Aphasic stroke    Diabetes mellitus    Respiratory failure    Hypertension    GERD (gastroesophageal reflux disease)    Constipation    Respiratory failure    CVA (cerebral vascular accident)    HTN (hypertension)    DM (diabetes mellitus)    Advanced dementia    COVID-19 virus detected    Quadriplegia    Pneumonia    Type II diabetes mellitus    Hx of appendectomy    Gastrostomy in place    Tracheostomy in place    Tracheostomy tube present    Feeding by G-tube        FAMILY HISTORY:  No pertinent family history in first degree relatives        Home Medications:  albuterol 90 mcg/inh inhalation aerosol: 2 puff(s) inhaled every 6 hours (08 Dec 2021 16:51)  Bacid (LAC) oral tablet: 2 tab(s) by gastrostomy tube once a day (08 Dec 2021 16:51)  Carafate 1 g/10 mL oral suspension: 10 milliliter(s) by gastrostomy tube 4 times a day (before meals and at bedtime) for 14 days (Started 6/4/21) (08 Dec 2021 16:51)  chlorhexidine 0.12% mucous membrane liquid: 15 milliliter(s) mucous membrane 2 times a day (08 Dec 2021 16:51)  insulin lispro 100 units/mL injectable solution: injectable 3 times a day ; sliding scale coverage  (14 Dec 2021 10:38)  ipratropium-albuterol 0.5 mg-2.5 mg/3 mL inhalation solution: 3 milliliter(s) inhaled 4 times a day (08 Dec 2021 16:51)  LORazepam 1 mg oral tablet: 1 tab(s) orally every 4 hours (20 Dec 2021 08:32)  methocarbamol 500 mg oral tablet: 1 tab(s) orally 2 times a day (14 Dec 2021 19:18)  pantoprazole 40 mg oral granule, delayed release: 40 milligram(s) by gastrostomy tube 2 times a day (08 Dec 2021 16:51)  polyethylene glycol 3350 oral powder for reconstitution: 17 gram(s) orally every 12 hours (08 Dec 2021 16:51)  ProAir HFA 90 mcg/inh inhalation aerosol: 2 puff(s) inhaled every 6 hours (08 Dec 2021 16:51)  senna 8.6 mg oral tablet: 3 tab(s) by gastrostomy tube once a day (at bedtime) (08 Dec 2021 16:51)  simethicone 80 mg oral tablet, chewable: 1 tab(s) orally every 6 hours (08 Dec 2021 16:51)  Tylenol 325 mg oral tablet: 2 tab(s) by gastrostomy tube once a day; 60 minutes prior to dressing change  (08 Dec 2021 16:51)      MEDICATIONS  (STANDING):  ALBUTerol    90 MICROgram(s) HFA Inhaler 2 Puff(s) Inhalation every 6 hours  dextrose 40% Gel 15 Gram(s) Oral once  dextrose 5%. 1000 milliLiter(s) (50 mL/Hr) IV Continuous <Continuous>  dextrose 5%. 250 milliLiter(s) (250 mL/Hr) IV Continuous <Continuous>  dextrose 5%. 1000 milliLiter(s) (100 mL/Hr) IV Continuous <Continuous>  dextrose 50% Injectable 25 Gram(s) IV Push once  dextrose 50% Injectable 25 Gram(s) IV Push once  dextrose 50% Injectable 12.5 Gram(s) IV Push once  glucagon  Injectable 1 milliGRAM(s) IntraMuscular once  heparin   Injectable 5000 Unit(s) SubCutaneous every 8 hours  insulin lispro (ADMELOG) corrective regimen sliding scale   SubCutaneous every 6 hours  lactobacillus acidophilus 1 Tablet(s) Oral daily  LORazepam     Tablet 1 milliGRAM(s) Oral every 4 hours  methocarbamol 500 milliGRAM(s) Oral two times a day  midodrine 2.5 milliGRAM(s) Oral every 8 hours  pantoprazole   Suspension 40 milliGRAM(s) Oral daily  povidone iodine 10% Solution 1 Application(s) Topical daily  senna 2 Tablet(s) Oral at bedtime  simethicone 80 milliGRAM(s) Chew every 6 hours    MEDICATIONS  (PRN):  acetaminophen     Tablet .. 650 milliGRAM(s) Oral every 6 hours PRN Mild Pain (1 - 3)  aluminum hydroxide/magnesium hydroxide/simethicone Suspension 30 milliLiter(s) Oral every 4 hours PRN Dyspepsia  melatonin 3 milliGRAM(s) Oral at bedtime PRN Insomnia  ondansetron Injectable 4 milliGRAM(s) IV Push every 8 hours PRN Nausea and/or Vomiting  polyethylene glycol 3350 17 Gram(s) Oral every 12 hours PRN Constipation      Diet, NPO:   Tube Feeding Modality: Gastrostomy  Glucerna 1.5 Horacio  Total Volume for 24 Hours (mL): 1200  Continuous  Starting Tube Feed Rate mL per Hour: 50  Until Goal Tube Feed Rate (mL per Hour): 50  Tube Feed Duration (in Hours): 24  Tube Feed Start Time: 16:00  Free Water Flush  Bolus   Total Volume per Flush (mL): 200   Frequency: Every 4 Hours (01-07-22 @ 17:17) [Active]          Vital Signs Last 24 Hrs  T(C): 36.5 (09 Jan 2022 04:00), Max: 36.6 (08 Jan 2022 12:00)  T(F): 97.7 (09 Jan 2022 04:00), Max: 97.9 (08 Jan 2022 21:00)  HR: 65 (09 Jan 2022 09:08) (65 - 87)  BP: 97/51 (09 Jan 2022 08:00) (91/38 - 111/46)  BP(mean): 65 (09 Jan 2022 08:00) (55 - 75)  RR: 16 (09 Jan 2022 08:00) (14 - 26)  SpO2: 110% (09 Jan 2022 09:08) (98% - 110%)      01-08-22 @ 07:01  -  01-09-22 @ 07:00  --------------------------------------------------------  IN: 3600 mL / OUT: 2675 mL / NET: 925 mL        Mode: AC/ CMV (Assist Control/ Continuous Mandatory Ventilation), RR (machine): 18, TV (machine): 400, FiO2: 40, PEEP: 5, ITime: 1, MAP: 11, PIP: 39      LABS:                        8.8    6.14  )-----------( 155      ( 09 Jan 2022 07:53 )             29.0     01-09    136  |  96  |  151<H>  ----------------------------<  179<H>  4.6   |  27  |  1.67<H>    Ca    7.7<L>      09 Jan 2022 07:53  Phos  4.8     01-08    TPro  6.2  /  Alb  2.1<L>  /  TBili  0.3  /  DBili  x   /  AST  184<H>  /  ALT  126<H>  /  AlkPhos  179<H>  01-09    PT/INR - ( 08 Jan 2022 07:07 )   PT: 11.6 sec;   INR: 0.96 ratio                   WBC:  WBC Count: 6.14 K/uL (01-09 @ 07:53)  WBC Count: 5.59 K/uL (01-08 @ 07:07)  WBC Count: 6.92 K/uL (01-07 @ 06:28)  WBC Count: 7.16 K/uL (01-07 @ 00:07)      MICROBIOLOGY:  RECENT CULTURES:  01-07 Clean Catch Clean Catch (Midstream) XXXX XXXX   <10,000 CFU/mL Normal Urogenital Elvira    01-07 .Blood Blood-Peripheral XXXX XXXX   No growth to date.          CARDIAC MARKERS ( 08 Jan 2022 07:07 )  x     / x     / 4649 U/L / x     / x            PT/INR - ( 08 Jan 2022 07:07 )   PT: 11.6 sec;   INR: 0.96 ratio             Sodium:  Sodium, Serum: 136 mmol/L (01-09 @ 07:53)  Sodium, Serum: 132 mmol/L (01-09 @ 00:52)  Sodium, Serum: 137 mmol/L (01-08 @ 17:03)  Sodium, Serum: 134 mmol/L (01-08 @ 07:07)  Sodium, Serum: 128 mmol/L (01-07 @ 22:30)  Sodium, Serum: 131 mmol/L (01-07 @ 16:18)  Sodium, Serum: 127 mmol/L (01-07 @ 06:28)  Sodium, Serum: 123 mmol/L (01-07 @ 00:07)  Sodium, Serum: 119 mmol/L (01-06 @ 21:21)      1.67 mg/dL 01-09 @ 07:53  1.94 mg/dL 01-09 @ 00:52  2.21 mg/dL 01-08 @ 17:03  2.66 mg/dL 01-08 @ 07:07  3.08 mg/dL 01-07 @ 22:30  3.41 mg/dL 01-07 @ 16:18  3.90 mg/dL 01-07 @ 06:28  4.23 mg/dL 01-07 @ 00:07  4.47 mg/dL 01-06 @ 21:21      Hemoglobin:  Hemoglobin: 8.8 g/dL (01-09 @ 07:53)  Hemoglobin: 7.9 g/dL (01-08 @ 07:07)  Hemoglobin: 9.1 g/dL (01-07 @ 06:28)  Hemoglobin: 8.7 g/dL (01-07 @ 00:07)      Platelets: Platelet Count - Automated: 155 K/uL (01-09 @ 07:53)  Platelet Count - Automated: 158 K/uL (01-08 @ 07:07)  Platelet Count - Automated: 94 K/uL (01-07 @ 06:28)  Platelet Count - Automated: 121 K/uL (01-07 @ 00:07)      LIVER FUNCTIONS - ( 09 Jan 2022 07:53 )  Alb: 2.1 g/dL / Pro: 6.2 g/dL / ALK PHOS: 179 U/L / ALT: 126 U/L DA / AST: 184 U/L / GGT: x                 RADIOLOGY & ADDITIONAL STUDIES:      MICROBIOLOGY:  RECENT CULTURES:  01-07 Clean Catch Clean Catch (Midstream) XXXX XXXX   <10,000 CFU/mL Normal Urogenital Elvira    01-07 .Blood Blood-Peripheral XXXX XXXX   No growth to date.

## 2022-01-09 NOTE — PROGRESS NOTE ADULT - ASSESSMENT
The patient is a 76 year old female with a history of HTN, DM, CVA, dementia, chronic respiratory failure s/p trach, PEG, cardiac arrest, anemia who presents with abnormal labs including hyponatremia, RYAN.    Plan:  - ECG with no evidence of ischemia or infarction  - Echo 9/21 with normal LV systolic function, mod pulm HTN  - Troponin mildly elevated at 109 and trended down, likely retention of enzymes from RYAN. No need to trend further.  - Hyponatremia resolved  - RYAN improving with IV fluids  - Renal follow-up  - Mechanical ventilation  - ICU care

## 2022-01-09 NOTE — PROGRESS NOTE ADULT - SUBJECTIVE AND OBJECTIVE BOX
Chief Complaint: Abnormal labs    Interval Events: No events overnight.    Review of Systems:  General: No fevers, chills, weight gain  Skin: No rashes, color changes  Cardiovascular: No chest pain, orthopnea  Respiratory: No shortness of breath, cough  Gastrointestinal: No nausea, abdominal pain  Genitourinary: No incontinence, pain with urination  Musculoskeletal: No pain, swelling, decreased range of motion  Neurological: No headache, weakness  Psychiatric: No depression, anxiety  Endocrine: No weight gain, increased thirst  All other systems are comprehensively negative.    Physical Exam:  Vital Signs Last 24 Hrs  T(C): 36.5 (09 Jan 2022 04:00), Max: 36.6 (08 Jan 2022 12:00)  T(F): 97.7 (09 Jan 2022 04:00), Max: 97.9 (08 Jan 2022 21:00)  HR: 65 (09 Jan 2022 09:08) (65 - 87)  BP: 97/51 (09 Jan 2022 08:00) (91/38 - 111/46)  BP(mean): 65 (09 Jan 2022 08:00) (55 - 75)  RR: 16 (09 Jan 2022 08:00) (14 - 26)  SpO2: 110% (09 Jan 2022 09:08) (98% - 110%)  General: NAD  HEENT: MMM  Neck: No JVD, no carotid bruit  Lungs: CTAB  CV: RRR, nl S1/S2, no M/R/G  Abdomen: S/NT/ND, +BS  Extremities: No LE edema, no cyanosis  Neuro: AAOx3, non-focal  Skin: No rash    Labs:    01-09    136  |  96  |  151<H>  ----------------------------<  179<H>  4.6   |  27  |  1.67<H>    Ca    7.7<L>      09 Jan 2022 07:53  Phos  4.8     01-08    TPro  6.2  /  Alb  2.1<L>  /  TBili  0.3  /  DBili  x   /  AST  184<H>  /  ALT  126<H>  /  AlkPhos  179<H>  01-09                        8.8    6.14  )-----------( 155      ( 09 Jan 2022 07:53 )             29.0         Telemetry: Sinus rhythm

## 2022-01-09 NOTE — PROGRESS NOTE ADULT - SUBJECTIVE AND OBJECTIVE BOX
Patient is a 76y old  Female who presents with a chief complaint of abnormal labs (09 Jan 2022 10:19)      INTERVAL HPI/OVERNIGHT EVENTS: Patient seen and examined at bedside. No overnight events.      MEDICATIONS  (STANDING):  ALBUTerol    90 MICROgram(s) HFA Inhaler 2 Puff(s) Inhalation every 6 hours  dextrose 40% Gel 15 Gram(s) Oral once  dextrose 5%. 1000 milliLiter(s) (50 mL/Hr) IV Continuous <Continuous>  dextrose 5%. 250 milliLiter(s) (250 mL/Hr) IV Continuous <Continuous>  dextrose 5%. 1000 milliLiter(s) (100 mL/Hr) IV Continuous <Continuous>  dextrose 50% Injectable 25 Gram(s) IV Push once  dextrose 50% Injectable 25 Gram(s) IV Push once  dextrose 50% Injectable 12.5 Gram(s) IV Push once  glucagon  Injectable 1 milliGRAM(s) IntraMuscular once  heparin   Injectable 5000 Unit(s) SubCutaneous every 8 hours  insulin lispro (ADMELOG) corrective regimen sliding scale   SubCutaneous every 6 hours  lactobacillus acidophilus 1 Tablet(s) Oral daily  LORazepam     Tablet 1 milliGRAM(s) Oral every 4 hours  methocarbamol 500 milliGRAM(s) Oral two times a day  midodrine 2.5 milliGRAM(s) Oral every 8 hours  pantoprazole   Suspension 40 milliGRAM(s) Oral daily  povidone iodine 10% Solution 1 Application(s) Topical daily  senna 2 Tablet(s) Oral at bedtime  simethicone 80 milliGRAM(s) Chew every 6 hours    MEDICATIONS  (PRN):  acetaminophen     Tablet .. 650 milliGRAM(s) Oral every 6 hours PRN Mild Pain (1 - 3)  aluminum hydroxide/magnesium hydroxide/simethicone Suspension 30 milliLiter(s) Oral every 4 hours PRN Dyspepsia  melatonin 3 milliGRAM(s) Oral at bedtime PRN Insomnia  ondansetron Injectable 4 milliGRAM(s) IV Push every 8 hours PRN Nausea and/or Vomiting  polyethylene glycol 3350 17 Gram(s) Oral every 12 hours PRN Constipation      Allergies    codeine (Hives)    Intolerances        REVIEW OF SYSTEMS:  Unable to obtain meaningful ROS due to mental status      Vital Signs Last 24 Hrs  T(C): 36.5 (09 Jan 2022 04:00), Max: 36.6 (08 Jan 2022 21:00)  T(F): 97.7 (09 Jan 2022 04:00), Max: 97.9 (08 Jan 2022 21:00)  HR: 84 (09 Jan 2022 12:00) (65 - 87)  BP: 103/52 (09 Jan 2022 12:00) (91/38 - 112/47)  BP(mean): 67 (09 Jan 2022 12:00) (55 - 75)  RR: 14 (09 Jan 2022 12:00) (14 - 26)  SpO2: 100% (09 Jan 2022 12:00) (98% - 110%)    PHYSICAL EXAM:  GENERAL: chronically ill appearing, emaciated, NAD, obtunded  HEAD:  atraumatic  EYES: conjunctiva clear  NECK: +trach in place, clean  RESPIRATORY:  coarse mechanical breath sounds, no gross crackles or rales (+)trach, vent  CARDIOVASCULAR:  regular rate and rhythm, no murmurs or rubs or gallops, 2+ peripheral pulses  GASTROINTESTINAL:  soft, clean and dry as well, mildly distended, PEG site c/d/i  EXTREMITIES: no clubbing or cyanosis or edema  MUSCULOSKELETAL:  +diffuse contractures in all joints  NERVOUS SYSTEM: does not respond to pain  SKIN: necrotic tips of bilateral 1st toes    LABS:                        8.8    6.14  )-----------( 155      ( 09 Jan 2022 07:53 )             29.0     CBC Full  -  ( 09 Jan 2022 07:53 )  WBC Count : 6.14 K/uL  Hemoglobin : 8.8 g/dL  Hematocrit : 29.0 %  Platelet Count - Automated : 155 K/uL  Mean Cell Volume : 82.9 fl  Mean Cell Hemoglobin : 25.1 pg  Mean Cell Hemoglobin Concentration : 30.3 gm/dL  Auto Neutrophil # : 4.46 K/uL  Auto Lymphocyte # : 1.00 K/uL  Auto Monocyte # : 0.48 K/uL  Auto Eosinophil # : 0.11 K/uL  Auto Basophil # : 0.02 K/uL  Auto Neutrophil % : 72.7 %  Auto Lymphocyte % : 16.3 %  Auto Monocyte % : 7.8 %  Auto Eosinophil % : 1.8 %  Auto Basophil % : 0.3 %    09 Jan 2022 07:53    136    |  96     |  151    ----------------------------<  179    4.6     |  27     |  1.67     Ca    7.7        09 Jan 2022 07:53    TPro  6.2    /  Alb  2.1    /  TBili  0.3    /  DBili  x      /  AST  184    /  ALT  126    /  AlkPhos  179    09 Jan 2022 07:53    PT/INR - ( 08 Jan 2022 07:07 )   PT: 11.6 sec;   INR: 0.96 ratio             CAPILLARY BLOOD GLUCOSE      POCT Blood Glucose.: 241 mg/dL (09 Jan 2022 12:14)  POCT Blood Glucose.: 208 mg/dL (09 Jan 2022 05:19)  POCT Blood Glucose.: 354 mg/dL (09 Jan 2022 00:20)  POCT Blood Glucose.: 284 mg/dL (08 Jan 2022 17:25)        Culture - Urine (collected 01-07-22 @ 13:18)  Source: Clean Catch Clean Catch (Midstream)  Final Report (01-08-22 @ 20:10):    <10,000 CFU/mL Normal Urogenital Elvira    Culture - Blood (collected 01-07-22 @ 13:17)  Source: .Blood Blood-Peripheral  Preliminary Report (01-08-22 @ 14:01):    No growth to date.    Culture - Blood (collected 01-07-22 @ 13:17)  Source: .Blood Blood-Peripheral  Preliminary Report (01-08-22 @ 14:01):    No growth to date.        RADIOLOGY & ADDITIONAL TESTS:     Consultant(s) Notes Reviewed:  [x] YES  [ ] NO    Care Discussed with [x] Consultants  [x] Patient  [ ] Family  [ ]      [ x] Other; RN  DVT ppx

## 2022-01-09 NOTE — PROGRESS NOTE ADULT - ASSESSMENT
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       electrolyte imbalance noted - vs noted - labs reviewed - ventilated -     supportive management  labs reviewed  imaging reviewed  on TF  vent support  GOC documented  full code  Marciano   lives in SNF - [facility]

## 2022-01-09 NOTE — PROGRESS NOTE ADULT - SUBJECTIVE AND OBJECTIVE BOX
Date/Time Patient Seen:  		  Referring MD:   Data Reviewed	       Patient is a 76y old  Female who presents with a chief complaint of abnormal labs (08 Jan 2022 20:32)      Subjective/HPI     PAST MEDICAL & SURGICAL HISTORY:  Dementia of frontal lobe type    Aphasic stroke    Diabetes mellitus    Respiratory failure    Hypertension    GERD (gastroesophageal reflux disease)    Constipation    Respiratory failure    CVA (cerebral vascular accident)    HTN (hypertension)    DM (diabetes mellitus)    Advanced dementia    COVID-19 virus detected    Quadriplegia    Pneumonia    Type II diabetes mellitus    Hx of appendectomy    Gastrostomy in place    Tracheostomy in place    Tracheostomy tube present    Feeding by G-tube          Medication list         MEDICATIONS  (STANDING):  ALBUTerol    90 MICROgram(s) HFA Inhaler 2 Puff(s) Inhalation every 6 hours  dextrose 40% Gel 15 Gram(s) Oral once  dextrose 5%. 1000 milliLiter(s) (50 mL/Hr) IV Continuous <Continuous>  dextrose 5%. 250 milliLiter(s) (250 mL/Hr) IV Continuous <Continuous>  dextrose 5%. 1000 milliLiter(s) (100 mL/Hr) IV Continuous <Continuous>  dextrose 50% Injectable 25 Gram(s) IV Push once  dextrose 50% Injectable 25 Gram(s) IV Push once  dextrose 50% Injectable 12.5 Gram(s) IV Push once  glucagon  Injectable 1 milliGRAM(s) IntraMuscular once  heparin   Injectable 5000 Unit(s) SubCutaneous every 8 hours  insulin lispro (ADMELOG) corrective regimen sliding scale   SubCutaneous every 6 hours  lactobacillus acidophilus 1 Tablet(s) Oral daily  LORazepam     Tablet 1 milliGRAM(s) Oral every 4 hours  methocarbamol 500 milliGRAM(s) Oral two times a day  midodrine 2.5 milliGRAM(s) Oral every 8 hours  pantoprazole   Suspension 40 milliGRAM(s) Oral daily  povidone iodine 10% Solution 1 Application(s) Topical daily  senna 2 Tablet(s) Oral at bedtime  simethicone 80 milliGRAM(s) Chew every 6 hours    MEDICATIONS  (PRN):  acetaminophen     Tablet .. 650 milliGRAM(s) Oral every 6 hours PRN Mild Pain (1 - 3)  aluminum hydroxide/magnesium hydroxide/simethicone Suspension 30 milliLiter(s) Oral every 4 hours PRN Dyspepsia  melatonin 3 milliGRAM(s) Oral at bedtime PRN Insomnia  ondansetron Injectable 4 milliGRAM(s) IV Push every 8 hours PRN Nausea and/or Vomiting  polyethylene glycol 3350 17 Gram(s) Oral every 12 hours PRN Constipation         Vitals log        ICU Vital Signs Last 24 Hrs  T(C): 36.5 (09 Jan 2022 04:00), Max: 36.6 (08 Jan 2022 12:00)  T(F): 97.7 (09 Jan 2022 04:00), Max: 97.9 (08 Jan 2022 21:00)  HR: 79 (09 Jan 2022 06:00) (75 - 87)  BP: 111/46 (09 Jan 2022 06:00) (91/38 - 111/46)  BP(mean): 66 (09 Jan 2022 06:00) (55 - 75)  ABP: --  ABP(mean): --  RR: 18 (09 Jan 2022 06:00) (14 - 26)  SpO2: 100% (09 Jan 2022 06:00) (98% - 100%)       Mode: AC/ CMV (Assist Control/ Continuous Mandatory Ventilation)  RR (machine): 18  TV (machine): 400  FiO2: 40  PEEP: 5  ITime: 1  MAP: 11  PIP: 38      Input and Output:  I&O's Detail    08 Jan 2022 07:01  -  09 Jan 2022 07:00  --------------------------------------------------------  IN:    dextrose 5%: 250 mL    dextrose 5%: 1000 mL    Enteral Tube Flush: 1200 mL    Glucerna 1.5: 1150 mL  Total IN: 3600 mL    OUT:    Indwelling Catheter - Urethral (mL): 2675 mL  Total OUT: 2675 mL    Total NET: 925 mL          Lab Data                        7.9    5.59  )-----------( 158      ( 08 Jan 2022 07:07 )             25.3     01-09    132<L>  |  94<L>  |  162<H>  ----------------------------<  338<H>  3.2<L>   |  31  |  1.94<H>    Ca    7.9<L>      09 Jan 2022 00:52  Phos  4.8     01-08    TPro  5.8<L>  /  Alb  1.9<L>  /  TBili  0.4  /  DBili  0.1  /  AST  267<H>  /  ALT  134<H>  /  AlkPhos  136<H>  01-08      CARDIAC MARKERS ( 08 Jan 2022 07:07 )  x     / x     / 4649 U/L / x     / x            Review of Systems	      Objective     Physical Examination    heart s1s2  lung dec BS  abd soft  head nc  trach  peg      Pertinent Lab findings & Imaging      Annalise:  NO   Adequate UO     I&O's Detail    08 Jan 2022 07:01  -  09 Jan 2022 07:00  --------------------------------------------------------  IN:    dextrose 5%: 250 mL    dextrose 5%: 1000 mL    Enteral Tube Flush: 1200 mL    Glucerna 1.5: 1150 mL  Total IN: 3600 mL    OUT:    Indwelling Catheter - Urethral (mL): 2675 mL  Total OUT: 2675 mL    Total NET: 925 mL               Discussed with:     Cultures:	        Radiology

## 2022-01-09 NOTE — DISCHARGE NOTE PROVIDER - HOSPITAL COURSE
FROM ADMISSION H+P:   HPI:  ***Patient with dementia and is non-verbal.  Unable to provide any HPI or ROS.  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  As per paperwork from [facility], patient was noted to have sodium 124, K 7.1, BUN/Cr 216/4.1.  Also noted with WBC 12.7, hgb 10.9.  Sent here for further workup.  No mention of any fevers or vomiting from [facility] (vital signs at [facility] were /60  HR 79  RR 18, 98%  T 98.1F).  As In the ED, patient's triage vitals were /81 HR 80  RR 18, 99% on vent.  Labs were significant for WBC 7, hgb 8.7.  Initial BMP showed sodium of 119, BUN/Cr of 259/4.47 with K 6.1.  Also notable were elevated LFTs (AST//104), lactate 2.6, troponin 109.8.  Patient was given insulin 5 units, D50, and kayexelate for the hyperkalemia.  Nephrology was consulted and did not think the patient needed dialysis.  Repeat BMP showed improvement of sodium (123), K 5.5, BUN/Cr 252/4.23).  CT C/A/P showed "patchy groundglass opacities and tree-in-bud nodularity likely related to multifocal infection and distal airways impaction similar when compared with prior study, although increased consolidation volume loss in the right lower lobe when compared with prior examination.  Improvement in small left and trace right pleural effusion.  No hydronephrosis.  Undistended bladder, apparent bladder wall thickening, correlate with urinalysis to exclude UTI."   (07 Jan 2022 01:07)      ---  HOSPITAL COURSE:   Acute renal failure   - possibly 2/2 ATN, no hydronephrosis seen on CT  - Improved over admission  - avoid nephrotoxic meds as much as possible  - as per nephrology -> dialysis not needed at this time  - Recommend continued hydration and IV fluids at [facility]    Hyponatremia    - Improved     Hyperkalemia  - given kayexelate on admission  - monitor with BMP    Elevated LFTs   - unclear etiology, possibly from dehydration?, CT does not point to any specific etiology  - Improving   - monitor LFTs  - GI evaluated patient while admitted     Elevated troponins   - likely 2/2 renal failure  - trended to peak     Chronic resp failure/COPD  - maintain vent settings, goal of SaO2 >95%  - cont with nebs  - pulmonary following     Elevated lactate  - would not start on any abx at this time (no leukocytosis, no fevers, CT not that impressive compared to previous CT)  - repeat lactate downtrending  - BCX and Ucxr- NGTD     Pressure ulcer   Sacral region deep tissue pressure injury (DTPI)   5 x 3 periwound with non-blanchable  erythema 5 x 3 per wound care RN  Wound care reccs appreciated    Dementia/Restlessness  - continue with ativan 1mg q4h standing    DM2   Continue home regimen     Advanced Illness, Protein-Calorie Malnutrition  - Nutrition consult  - turn and position q2    ---  TIME SPENT:  I, the attending physician, was physically present for the key portions of the evaluation and management (E/M) service provided. The total amount of time spent reviewing the hospital notes, laboratory values, imaging findings, assessing/counseling the patient, discussing with consultant physicians, social work, nursing staff was 48 minutes FROM ADMISSION H+P:   HPI:  ***Patient with dementia and is non-verbal.  Unable to provide any HPI or ROS.  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  As per paperwork from [facility], patient was noted to have sodium 124, K 7.1, BUN/Cr 216/4.1.  Also noted with WBC 12.7, hgb 10.9.  Sent here for further workup.  No mention of any fevers or vomiting from [facility] (vital signs at [facility] were /60  HR 79  RR 18, 98%  T 98.1F).  As In the ED, patient's triage vitals were /81 HR 80  RR 18, 99% on vent.  Labs were significant for WBC 7, hgb 8.7.  Initial BMP showed sodium of 119, BUN/Cr of 259/4.47 with K 6.1.  Also notable were elevated LFTs (AST//104), lactate 2.6, troponin 109.8.  Patient was given insulin 5 units, D50, and kayexelate for the hyperkalemia.  Nephrology was consulted and did not think the patient needed dialysis.  Repeat BMP showed improvement of sodium (123), K 5.5, BUN/Cr 252/4.23).  CT C/A/P showed "patchy groundglass opacities and tree-in-bud nodularity likely related to multifocal infection and distal airways impaction similar when compared with prior study, although increased consolidation volume loss in the right lower lobe when compared with prior examination.  Improvement in small left and trace right pleural effusion.  No hydronephrosis.  Undistended bladder, apparent bladder wall thickening, correlate with urinalysis to exclude UTI."   (07 Jan 2022 01:07)      ---  HOSPITAL COURSE:   Acute renal failure   - possibly 2/2 ATN, no hydronephrosis seen on CT  - Improved over admission  - avoid nephrotoxic meds as much as possible  - as per nephrology -> dialysis not needed at this time  - Recommend continued hydration and IV fluids at [facility]  - Spoke with Dr. Sandoval and Dr. Reddy, patient is medically stable with downtrending Cr. Labs can be followed at [facility] with nephrology and IV fluids can be ordered if necessary   -  in agreement with dispo and plan.      Hyponatremia    - Improved     Hyperkalemia  - given kayexelate on admission  - monitor with BMP    Elevated LFTs   - unclear etiology, possibly from dehydration?, CT does not point to any specific etiology  - Improving   - monitor LFTs  - GI evaluated patient while admitted     Elevated troponins   - likely 2/2 renal failure  - trended to peak     Chronic resp failure/COPD  - maintain vent settings, goal of SaO2 >95%  - cont with nebs  - pulmonary following     Elevated lactate  - would not start on any abx at this time (no leukocytosis, no fevers, CT not that impressive compared to previous CT)  - repeat lactate downtrending  - BCX and Ucxr- NGTD     Pressure ulcer   Sacral region deep tissue pressure injury (DTPI)   5 x 3 periwound with non-blanchable  erythema 5 x 3 per wound care RN  Wound care reccs appreciated    Dementia/Restlessness  - continue with ativan 1mg q4h standing    DM2   Continue home regimen     Advanced Illness, Protein-Calorie Malnutrition  - Nutrition consult  - turn and position q2    ---  TIME SPENT:  I, the attending physician, was physically present for the key portions of the evaluation and management (E/M) service provided. The total amount of time spent reviewing the hospital notes, laboratory values, imaging findings, assessing/counseling the patient, discussing with consultant physicians, social work, nursing staff was 48 minutes

## 2022-01-09 NOTE — DISCHARGE NOTE PROVIDER - PROVIDER TOKENS
PROVIDER:[TOKEN:[[id number]],FOLLOWUP:[1-3 days]],PROVIDER:[TOKEN:[[id number]],FOLLOWUP:[1 week]],PROVIDER:[TOKEN:[[id number]],FOLLOWUP:[1 week]]

## 2022-01-09 NOTE — DISCHARGE NOTE PROVIDER - NSDCCPCAREPLAN_GEN_ALL_CORE_FT
PRINCIPAL DISCHARGE DIAGNOSIS  Diagnosis: RYAN (acute kidney injury)  Assessment and Plan of Treatment: Cr elevated on admission, improved with hydration. Recommend continue hydration either via PEG tube or IV. Recommend checking bmp within 1-3 days of discharge.      SECONDARY DISCHARGE DIAGNOSES  Diagnosis: Hyponatremia  Assessment and Plan of Treatment: Improved. Repeat BMP within 1-3 days of discharge    Diagnosis: Hyperkalemia  Assessment and Plan of Treatment: Improved. Repeat BMP within 1-3 days of discharge    Diagnosis: Anemia of chronic disease  Assessment and Plan of Treatment: Recommend checking CBC regularly and following up with hematology outpatient. May need routine transfusion outpatient for HGB <7.0. Follow up with Dr. Solares. Continue Protonix and Carafate     PRINCIPAL DISCHARGE DIAGNOSIS  Diagnosis: RYAN (acute kidney injury)  Assessment and Plan of Treatment: Cr elevated on admission, improved with hydration. Recommend continue hydration either via PEG tube or IV. Currently recieving 250cc free water Q8 hours. Recommend checking bmp within 1-3 days of discharge. Recommend follow up with a nephrologist for continued observation and monitoring if Cr remains elevated      SECONDARY DISCHARGE DIAGNOSES  Diagnosis: Hyponatremia  Assessment and Plan of Treatment: Improved. Repeat BMP within 1-3 days of discharge    Diagnosis: Hyperkalemia  Assessment and Plan of Treatment: Improved. Repeat BMP within 1-3 days of discharge    Diagnosis: Anemia of chronic disease  Assessment and Plan of Treatment: Recommend checking CBC regularly and following up with hematology outpatient. May need routine transfusion outpatient for HGB <7.0. Follow up with Dr. Solares. Continue Protonix and Carafate     PRINCIPAL DISCHARGE DIAGNOSIS  Diagnosis: RYAN (acute kidney injury)  Assessment and Plan of Treatment: Cr elevated on admission, improved with hydration. Recommend continue hydration either via PEG tube or IV. Currently recieving 250cc free water Q8 hours. Recommend checking bmp within 1-3 days of discharge. Recommend follow up with a nephrologist for continued observation and monitoring if Cr remains elevated      SECONDARY DISCHARGE DIAGNOSES  Diagnosis: Hyponatremia  Assessment and Plan of Treatment: Improved. Repeat BMP within 1-3 days of discharge    Diagnosis: Hyperkalemia  Assessment and Plan of Treatment: Improved. Repeat BMP within 1-3 days of discharge    Diagnosis: Anemia of chronic disease  Assessment and Plan of Treatment: Recommend checking CBC regularly and following up with hematology outpatient. May need routine transfusion outpatient for HGB <7.0. Follow up with Dr. Solares. Continue Protonix and Carafate    Diagnosis: Pressure injury of skin  Assessment and Plan of Treatment: Sacral region deep tissue pressure injury (DTPI)   5 x 3 periwound with non-blanchable  erythema 5 x 3 per wound care RN  Recommend routine wound care

## 2022-01-09 NOTE — PROGRESS NOTE ADULT - SUBJECTIVE AND OBJECTIVE BOX
Patient is a 76y old  Female who presents with a chief complaint of abnormal labs (09 Jan 2022 15:58)    PAST MEDICAL & SURGICAL HISTORY:  Dementia of frontal lobe type    Aphasic stroke    Diabetes mellitus    Respiratory failure    Hypertension    GERD (gastroesophageal reflux disease)    Constipation    Respiratory failure    CVA (cerebral vascular accident)    HTN (hypertension)    DM (diabetes mellitus)    Advanced dementia    COVID-19 virus detected    Quadriplegia    Pneumonia    Type II diabetes mellitus    Hx of appendectomy    Gastrostomy in place    Tracheostomy in place    Tracheostomy tube present    Feeding by G-tube      BREA BECKHAM   76y    Female    BRIEF HOSPITAL COURSE:    76F with advanced dementia s/p PEG with severe contractures, hx of cardiac arrest, hx of subarachnoid hemorrhage, hx of CVA, COPD with chronic resp failure s/p trach, 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 Admitted with hyponatremia and chronic respiratory failure      Renal fx recovering      Review of Systems: UATO                     All other ROS are negative.    Allergies    codeine (Hives)    Intolerances          ICU Vital Signs Last 24 Hrs  T(C): 36.5 (09 Jan 2022 04:00), Max: 36.5 (09 Jan 2022 04:00)  T(F): 97.7 (09 Jan 2022 04:00), Max: 97.7 (09 Jan 2022 04:00)  HR: 76 (09 Jan 2022 20:20) (65 - 84)  BP: 106/52 (09 Jan 2022 20:00) (91/38 - 112/47)  BP(mean): 68 (09 Jan 2022 20:00) (55 - 75)  ABP: --  ABP(mean): --  RR: 18 (09 Jan 2022 20:00) (14 - 26)  SpO2: 99% (09 Jan 2022 20:20) (98% - 110%)    Physical Examination:    General:  NAD    HEENT: trach site clean    PULM: b/l  BS     CVS: s1 s2 reg    ABD: soft + PEG    EXT: no edema     SKIN: warm    Neuro: contracted eyes closed       Mode: AC/ CMV (Assist Control/ Continuous Mandatory Ventilation)  RR (machine): 18  TV (machine): 400  FiO2: 40  PEEP: 5  PS: 10  ITime: 1  MAP: 10  PIP: 27    Mode: AC/ CMV (Assist Control/ Continuous Mandatory Ventilation), RR (machine): 18, TV (machine): 400, FiO2: 40, PEEP: 5, PS: 10, ITime: 1, MAP: 10, PIP: 27  LABS:                        8.8    6.14  )-----------( 155      ( 09 Jan 2022 07:53 )             29.0     01-09    136  |  96  |  151<H>  ----------------------------<  179<H>  4.6   |  27  |  1.67<H>    Ca    7.7<L>      09 Jan 2022 07:53  Phos  4.8     01-08    TPro  6.2  /  Alb  2.1<L>  /  TBili  0.3  /  DBili  x   /  AST  184<H>  /  ALT  126<H>  /  AlkPhos  179<H>  01-09      CARDIAC MARKERS ( 08 Jan 2022 07:07 )  x     / x     / 4649 U/L / x     / x          CAPILLARY BLOOD GLUCOSE      POCT Blood Glucose.: 233 mg/dL (09 Jan 2022 17:44)  POCT Blood Glucose.: 241 mg/dL (09 Jan 2022 12:14)  POCT Blood Glucose.: 208 mg/dL (09 Jan 2022 05:19)  POCT Blood Glucose.: 354 mg/dL (09 Jan 2022 00:20)    PT/INR - ( 08 Jan 2022 07:07 )   PT: 11.6 sec;   INR: 0.96 ratio             CULTURES:  Culture Results:   <10,000 CFU/mL Normal Urogenital Elvira (01-07 @ 13:18)  Culture Results:   No growth to date. (01-07 @ 13:17)  Culture Results:   No growth to date. (01-07 @ 13:17)  Rapid RVP Result: NotDetec (01-06 @ 21:21)      Medications:  MEDICATIONS  (STANDING):  ALBUTerol    90 MICROgram(s) HFA Inhaler 2 Puff(s) Inhalation every 6 hours  dextrose 40% Gel 15 Gram(s) Oral once  dextrose 5%. 1000 milliLiter(s) (50 mL/Hr) IV Continuous <Continuous>  dextrose 5%. 1000 milliLiter(s) (100 mL/Hr) IV Continuous <Continuous>  dextrose 50% Injectable 25 Gram(s) IV Push once  dextrose 50% Injectable 12.5 Gram(s) IV Push once  dextrose 50% Injectable 25 Gram(s) IV Push once  glucagon  Injectable 1 milliGRAM(s) IntraMuscular once  heparin   Injectable 5000 Unit(s) SubCutaneous every 8 hours  insulin lispro (ADMELOG) corrective regimen sliding scale   SubCutaneous every 6 hours  lactobacillus acidophilus 1 Tablet(s) Oral daily  LORazepam     Tablet 1 milliGRAM(s) Oral every 4 hours  methocarbamol 500 milliGRAM(s) Oral two times a day  midodrine 2.5 milliGRAM(s) Oral every 8 hours  pantoprazole   Suspension 40 milliGRAM(s) Oral daily  povidone iodine 10% Solution 1 Application(s) Topical daily  senna 2 Tablet(s) Oral at bedtime  simethicone 80 milliGRAM(s) Chew every 6 hours    MEDICATIONS  (PRN):  acetaminophen     Tablet .. 650 milliGRAM(s) Oral every 6 hours PRN Mild Pain (1 - 3)  aluminum hydroxide/magnesium hydroxide/simethicone Suspension 30 milliLiter(s) Oral every 4 hours PRN Dyspepsia  melatonin 3 milliGRAM(s) Oral at bedtime PRN Insomnia  ondansetron Injectable 4 milliGRAM(s) IV Push every 8 hours PRN Nausea and/or Vomiting  polyethylene glycol 3350 17 Gram(s) Oral every 12 hours PRN Constipation        01-08 @ 07:01 - 01-09 @ 07:00  --------------------------------------------------------  IN: 3600 mL / OUT: 2675 mL / NET: 925 mL    01-09 @ 07:01  -  01-09 @ 21:04  --------------------------------------------------------  IN: 100 mL / OUT: 0 mL / NET: 100 mL        RADIOLOGY/IMAGING/ECHO    < from: CT Chest No Cont (01.06.22 @ 23:39) >    IMPRESSION:  Patchy groundglass opacities and tree-in-bud nodularity likely related to   multifocal infection and distal airways impaction, similar when compared   with the prior study, although increasedconsolidation volume loss in the   right lower lobe when compared with the prior examination. Improvement in   small left and trace right pleural effusions.    No hydronephrosis. Underdistended bladder, apparent bladder wall   thickening, correlate with urinalysis to exclude UTI.    < end of copied text >      Assessment/Plan:      76F admitted  1/7 for electrolyte imbalances with elevated LFT's and RYAN    RYAN dehydration/rhabdo  improving with all the IVF she received which has been stopped.      H20 water flushed 250q 6.    Start discharge planning

## 2022-01-09 NOTE — DISCHARGE NOTE PROVIDER - CARE PROVIDER_API CALL
Paul Merrill  INTERNAL MEDICINE  [address] [place] [address], [address]  [place] [address]  Phone: [phone]  Fax: [phone]  Follow Up Time: 1-3 days    Pahlavan, Mohsen (MD)  [address], [address]  [place] [address]  Phone: [phone]  Fax: [phone]  Follow Up Time: 1 week    Ameya Solares  [address], [address]  [place] [address]  Phone: [phone]  Fax: [phone]  Follow Up Time: 1 week

## 2022-01-10 LAB
ALBUMIN SERPL ELPH-MCNC: 1.9 G/DL — LOW (ref 3.3–5)
ALP SERPL-CCNC: 156 U/L — HIGH (ref 30–120)
ALT FLD-CCNC: 100 U/L DA — HIGH (ref 10–60)
ANION GAP SERPL CALC-SCNC: 8 MMOL/L — SIGNIFICANT CHANGE UP (ref 5–17)
AST SERPL-CCNC: 98 U/L — HIGH (ref 10–40)
BASOPHILS # BLD AUTO: 0.03 K/UL — SIGNIFICANT CHANGE UP (ref 0–0.2)
BASOPHILS NFR BLD AUTO: 0.3 % — SIGNIFICANT CHANGE UP (ref 0–2)
BILIRUB SERPL-MCNC: 0.3 MG/DL — SIGNIFICANT CHANGE UP (ref 0.2–1.2)
BUN SERPL-MCNC: 117 MG/DL — HIGH (ref 7–23)
CALCIUM SERPL-MCNC: 8.6 MG/DL — SIGNIFICANT CHANGE UP (ref 8.4–10.5)
CHLORIDE SERPL-SCNC: 97 MMOL/L — SIGNIFICANT CHANGE UP (ref 96–108)
CO2 SERPL-SCNC: 33 MMOL/L — HIGH (ref 22–31)
CREAT SERPL-MCNC: 1.51 MG/DL — HIGH (ref 0.5–1.3)
EOSINOPHIL # BLD AUTO: 0.1 K/UL — SIGNIFICANT CHANGE UP (ref 0–0.5)
EOSINOPHIL NFR BLD AUTO: 1.2 % — SIGNIFICANT CHANGE UP (ref 0–6)
GLUCOSE SERPL-MCNC: 239 MG/DL — HIGH (ref 70–99)
HCT VFR BLD CALC: 27 % — LOW (ref 34.5–45)
HGB BLD-MCNC: 8.2 G/DL — LOW (ref 11.5–15.5)
IMM GRANULOCYTES NFR BLD AUTO: 3.6 % — HIGH (ref 0–1.5)
LYMPHOCYTES # BLD AUTO: 1.64 K/UL — SIGNIFICANT CHANGE UP (ref 1–3.3)
LYMPHOCYTES # BLD AUTO: 19.1 % — SIGNIFICANT CHANGE UP (ref 13–44)
MAGNESIUM SERPL-MCNC: 2.6 MG/DL — SIGNIFICANT CHANGE UP (ref 1.6–2.6)
MCHC RBC-ENTMCNC: 25.1 PG — LOW (ref 27–34)
MCHC RBC-ENTMCNC: 30.4 GM/DL — LOW (ref 32–36)
MCV RBC AUTO: 82.6 FL — SIGNIFICANT CHANGE UP (ref 80–100)
MONOCYTES # BLD AUTO: 0.67 K/UL — SIGNIFICANT CHANGE UP (ref 0–0.9)
MONOCYTES NFR BLD AUTO: 7.8 % — SIGNIFICANT CHANGE UP (ref 2–14)
NEUTROPHILS # BLD AUTO: 5.85 K/UL — SIGNIFICANT CHANGE UP (ref 1.8–7.4)
NEUTROPHILS NFR BLD AUTO: 68 % — SIGNIFICANT CHANGE UP (ref 43–77)
NRBC # BLD: 0 /100 WBCS — SIGNIFICANT CHANGE UP (ref 0–0)
PHOSPHATE SERPL-MCNC: 3 MG/DL — SIGNIFICANT CHANGE UP (ref 2.5–4.5)
PLATELET # BLD AUTO: 184 K/UL — SIGNIFICANT CHANGE UP (ref 150–400)
POTASSIUM SERPL-MCNC: 3.3 MMOL/L — LOW (ref 3.5–5.3)
POTASSIUM SERPL-SCNC: 3.3 MMOL/L — LOW (ref 3.5–5.3)
PROT SERPL-MCNC: 6.6 G/DL — SIGNIFICANT CHANGE UP (ref 6–8.3)
RBC # BLD: 3.27 M/UL — LOW (ref 3.8–5.2)
RBC # FLD: 15.6 % — HIGH (ref 10.3–14.5)
SODIUM SERPL-SCNC: 138 MMOL/L — SIGNIFICANT CHANGE UP (ref 135–145)
WBC # BLD: 8.6 K/UL — SIGNIFICANT CHANGE UP (ref 3.8–10.5)
WBC # FLD AUTO: 8.6 K/UL — SIGNIFICANT CHANGE UP (ref 3.8–10.5)

## 2022-01-10 PROCEDURE — 99233 SBSQ HOSP IP/OBS HIGH 50: CPT

## 2022-01-10 RX ORDER — INSULIN GLARGINE 100 [IU]/ML
5 INJECTION, SOLUTION SUBCUTANEOUS AT BEDTIME
Refills: 0 | Status: DISCONTINUED | OUTPATIENT
Start: 2022-01-10 | End: 2022-01-11

## 2022-01-10 RX ORDER — POTASSIUM CHLORIDE 20 MEQ
40 PACKET (EA) ORAL ONCE
Refills: 0 | Status: COMPLETED | OUTPATIENT
Start: 2022-01-10 | End: 2022-01-10

## 2022-01-10 RX ORDER — INSULIN GLARGINE 100 [IU]/ML
10 INJECTION, SOLUTION SUBCUTANEOUS AT BEDTIME
Refills: 0 | Status: DISCONTINUED | OUTPATIENT
Start: 2022-01-10 | End: 2022-01-10

## 2022-01-10 RX ADMIN — Medication 1 MILLIGRAM(S): at 02:23

## 2022-01-10 RX ADMIN — Medication 4: at 22:43

## 2022-01-10 RX ADMIN — HEPARIN SODIUM 5000 UNIT(S): 5000 INJECTION INTRAVENOUS; SUBCUTANEOUS at 21:51

## 2022-01-10 RX ADMIN — PANTOPRAZOLE SODIUM 40 MILLIGRAM(S): 20 TABLET, DELAYED RELEASE ORAL at 11:54

## 2022-01-10 RX ADMIN — HEPARIN SODIUM 5000 UNIT(S): 5000 INJECTION INTRAVENOUS; SUBCUTANEOUS at 14:26

## 2022-01-10 RX ADMIN — ALBUTEROL 2 PUFF(S): 90 AEROSOL, METERED ORAL at 08:00

## 2022-01-10 RX ADMIN — Medication 1 MILLIGRAM(S): at 06:27

## 2022-01-10 RX ADMIN — Medication 1 MILLIGRAM(S): at 17:42

## 2022-01-10 RX ADMIN — SIMETHICONE 80 MILLIGRAM(S): 80 TABLET, CHEWABLE ORAL at 23:27

## 2022-01-10 RX ADMIN — Medication 4: at 05:08

## 2022-01-10 RX ADMIN — ALBUTEROL 2 PUFF(S): 90 AEROSOL, METERED ORAL at 20:47

## 2022-01-10 RX ADMIN — SIMETHICONE 80 MILLIGRAM(S): 80 TABLET, CHEWABLE ORAL at 00:45

## 2022-01-10 RX ADMIN — Medication 1 MILLIGRAM(S): at 21:51

## 2022-01-10 RX ADMIN — HEPARIN SODIUM 5000 UNIT(S): 5000 INJECTION INTRAVENOUS; SUBCUTANEOUS at 05:08

## 2022-01-10 RX ADMIN — Medication 1 APPLICATION(S): at 11:55

## 2022-01-10 RX ADMIN — Medication 4: at 00:46

## 2022-01-10 RX ADMIN — METHOCARBAMOL 500 MILLIGRAM(S): 500 TABLET, FILM COATED ORAL at 17:42

## 2022-01-10 RX ADMIN — ALBUTEROL 2 PUFF(S): 90 AEROSOL, METERED ORAL at 01:29

## 2022-01-10 RX ADMIN — METHOCARBAMOL 500 MILLIGRAM(S): 500 TABLET, FILM COATED ORAL at 05:08

## 2022-01-10 RX ADMIN — SIMETHICONE 80 MILLIGRAM(S): 80 TABLET, CHEWABLE ORAL at 05:08

## 2022-01-10 RX ADMIN — Medication 1 TABLET(S): at 11:54

## 2022-01-10 RX ADMIN — Medication 40 MILLIEQUIVALENT(S): at 12:11

## 2022-01-10 RX ADMIN — ALBUTEROL 2 PUFF(S): 90 AEROSOL, METERED ORAL at 13:45

## 2022-01-10 RX ADMIN — INSULIN GLARGINE 5 UNIT(S): 100 INJECTION, SOLUTION SUBCUTANEOUS at 22:42

## 2022-01-10 RX ADMIN — SIMETHICONE 80 MILLIGRAM(S): 80 TABLET, CHEWABLE ORAL at 17:42

## 2022-01-10 RX ADMIN — Medication 1 MILLIGRAM(S): at 11:54

## 2022-01-10 RX ADMIN — SIMETHICONE 80 MILLIGRAM(S): 80 TABLET, CHEWABLE ORAL at 11:55

## 2022-01-10 RX ADMIN — Medication 1 MILLIGRAM(S): at 14:26

## 2022-01-10 RX ADMIN — Medication 6: at 12:06

## 2022-01-10 RX ADMIN — Medication 6: at 17:51

## 2022-01-10 RX ADMIN — MIDODRINE HYDROCHLORIDE 2.5 MILLIGRAM(S): 2.5 TABLET ORAL at 05:08

## 2022-01-10 RX ADMIN — SENNA PLUS 2 TABLET(S): 8.6 TABLET ORAL at 21:51

## 2022-01-10 NOTE — PROGRESS NOTE ADULT - ASSESSMENT
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       electrolyte imbalance noted - vs noted - labs reviewed - ventilated -     supportive management  labs reviewed  imaging reviewed  on TF  vent support  GOC documented  full code  Marciano   lives in SNF - [facility]

## 2022-01-10 NOTE — PROGRESS NOTE ADULT - ASSESSMENT
Cerebellar neuro assessment completed and with no new deficits according to patient's baseline.    The patient is a 76 year old female with a history of HTN, DM, CVA, dementia, chronic respiratory failure s/p trach, PEG, cardiac arrest, anemia who presents with abnormal labs including hyponatremia, RYAN.    Plan:  - ECG with no evidence of ischemia or infarction  - Echo 9/21 with normal LV systolic function, mod pulm HTN  - Troponin mildly elevated at 109 and trended down, likely retention of enzymes from YRAN. No need to trend further.  - Hyponatremia resolved  - RYAN improved with IV fluids  - Renal follow-up  - Mechanical ventilation

## 2022-01-10 NOTE — PROGRESS NOTE ADULT - SUBJECTIVE AND OBJECTIVE BOX
Patient is a 76y Female whom presented to the hospital with manasa    PAST MEDICAL & SURGICAL HISTORY:  Dementia of frontal lobe type    Aphasic stroke    Diabetes mellitus    Respiratory failure    Hypertension    GERD (gastroesophageal reflux disease)    Constipation    Respiratory failure    CVA (cerebral vascular accident)    HTN (hypertension)    DM (diabetes mellitus)    Advanced dementia    COVID-19 virus detected    Quadriplegia    Pneumonia    Type II diabetes mellitus    Hx of appendectomy    Gastrostomy in place    Tracheostomy in place    Tracheostomy tube present    Feeding by G-tube        MEDICATIONS  (STANDING):  ALBUTerol    90 MICROgram(s) HFA Inhaler 2 Puff(s) Inhalation every 6 hours  dextrose 40% Gel 15 Gram(s) Oral once  dextrose 5%. 1000 milliLiter(s) (50 mL/Hr) IV Continuous <Continuous>  dextrose 5%. 1000 milliLiter(s) (100 mL/Hr) IV Continuous <Continuous>  dextrose 50% Injectable 25 Gram(s) IV Push once  dextrose 50% Injectable 12.5 Gram(s) IV Push once  dextrose 50% Injectable 25 Gram(s) IV Push once  glucagon  Injectable 1 milliGRAM(s) IntraMuscular once  heparin   Injectable 5000 Unit(s) SubCutaneous every 8 hours  insulin lispro (ADMELOG) corrective regimen sliding scale   SubCutaneous every 6 hours  lactobacillus acidophilus 1 Tablet(s) Oral daily  LORazepam     Tablet 1 milliGRAM(s) Oral every 4 hours  methocarbamol 500 milliGRAM(s) Oral two times a day  midodrine 2.5 milliGRAM(s) Oral every 8 hours  pantoprazole   Suspension 40 milliGRAM(s) Oral daily  senna 2 Tablet(s) Oral at bedtime  simethicone 80 milliGRAM(s) Chew every 6 hours      Allergies    codeine (Hives)    Intolerances        SOCIAL HISTORY:  Denies ETOh,Smoking,     FAMILY HISTORY:  No pertinent family history in first degree relatives        REVIEW OF SYSTEMS:    unable to obtained a good review system                                                                        8.2    8.60  )-----------( 184      ( 10 Lei 2022 06:07 )             27.0       CBC Full  -  ( 10 Lei 2022 06:07 )  WBC Count : 8.60 K/uL  RBC Count : 3.27 M/uL  Hemoglobin : 8.2 g/dL  Hematocrit : 27.0 %  Platelet Count - Automated : 184 K/uL  Mean Cell Volume : 82.6 fl  Mean Cell Hemoglobin : 25.1 pg  Mean Cell Hemoglobin Concentration : 30.4 gm/dL  Auto Neutrophil # : 5.85 K/uL  Auto Lymphocyte # : 1.64 K/uL  Auto Monocyte # : 0.67 K/uL  Auto Eosinophil # : 0.10 K/uL  Auto Basophil # : 0.03 K/uL  Auto Neutrophil % : 68.0 %  Auto Lymphocyte % : 19.1 %  Auto Monocyte % : 7.8 %  Auto Eosinophil % : 1.2 %  Auto Basophil % : 0.3 %      01-10    138  |  97  |  117<H>  ----------------------------<  239<H>  3.3<L>   |  33<H>  |  1.51<H>    Ca    8.6      10 Lei 2022 06:07  Phos  3.0     01-10  Mg     2.6     01-10    TPro  6.6  /  Alb  1.9<L>  /  TBili  0.3  /  DBili  x   /  AST  98<H>  /  ALT  100<H>  /  AlkPhos  156<H>  01-10      CAPILLARY BLOOD GLUCOSE      POCT Blood Glucose.: 293 mg/dL (10 Lei 2022 17:47)  POCT Blood Glucose.: 285 mg/dL (10 Lei 2022 12:04)  POCT Blood Glucose.: 219 mg/dL (10 Lei 2022 05:06)  POCT Blood Glucose.: 240 mg/dL (10 Lei 2022 00:41)      Vital Signs Last 24 Hrs  T(C): 36.8 (10 Lei 2022 20:00), Max: 36.8 (10 Lei 2022 20:00)  T(F): 98.2 (10 Lei 2022 20:00), Max: 98.2 (10 Lei 2022 20:00)  HR: 83 (10 Lei 2022 20:51) (69 - 110)  BP: 105/54 (10 Lei 2022 20:00) (96/45 - 163/60)  BP(mean): 69 (10 Lei 2022 20:00) (60 - 89)  RR: 15 (10 Lei 2022 20:00) (15 - 21)  SpO2: 100% (10 Lei 2022 20:51) (98% - 100%)                 PHYSICAL EXAM:    Constitutional: NAD  HEENT: conjunctive   clear   Neck:  No JVD  Respiratory: decrease bs b/l   Cardiovascular: S1 and S2  Gastrointestinal: BS+, soft, pos peg   Extremities: No peripheral edema

## 2022-01-10 NOTE — PROGRESS NOTE ADULT - SUBJECTIVE AND OBJECTIVE BOX
Neurology follow up note    BREA [id number]      Interval History:    Patient awake in bed     Allergies    codeine (Hives)    Intolerances        MEDICATIONS    acetaminophen     Tablet .. 650 milliGRAM(s) Oral every 6 hours PRN  ALBUTerol    90 MICROgram(s) HFA Inhaler 2 Puff(s) Inhalation every 6 hours  aluminum hydroxide/magnesium hydroxide/simethicone Suspension 30 milliLiter(s) Oral every 4 hours PRN  dextrose 40% Gel 15 Gram(s) Oral once  dextrose 5%. 1000 milliLiter(s) IV Continuous <Continuous>  dextrose 5%. 1000 milliLiter(s) IV Continuous <Continuous>  dextrose 50% Injectable 25 Gram(s) IV Push once  dextrose 50% Injectable 12.5 Gram(s) IV Push once  dextrose 50% Injectable 25 Gram(s) IV Push once  glucagon  Injectable 1 milliGRAM(s) IntraMuscular once  heparin   Injectable 5000 Unit(s) SubCutaneous every 8 hours  insulin glargine Injectable (LANTUS) 5 Unit(s) SubCutaneous at bedtime  insulin lispro (ADMELOG) corrective regimen sliding scale   SubCutaneous every 6 hours  lactobacillus acidophilus 1 Tablet(s) Oral daily  LORazepam     Tablet 1 milliGRAM(s) Oral every 4 hours  melatonin 3 milliGRAM(s) Oral at bedtime PRN  methocarbamol 500 milliGRAM(s) Oral two times a day  pantoprazole   Suspension 40 milliGRAM(s) Oral daily  polyethylene glycol 3350 17 Gram(s) Oral every 12 hours PRN  povidone iodine 10% Solution 1 Application(s) Topical daily  senna 2 Tablet(s) Oral at bedtime  simethicone 80 milliGRAM(s) Chew every 6 hours              Vital Signs Last 24 Hrs  T(C): 36.7 (10 Lei 2022 04:00), Max: 36.8 (09 Jan 2022 20:00)  T(F): 98.1 (10 Lei 2022 04:00), Max: 98.2 (09 Jan 2022 20:00)  HR: 83 (10 Lei 2022 12:00) (69 - 88)  BP: 120/45 (10 Lei 2022 12:00) (96/45 - 120/51)  BP(mean): 68 (10 Lei 2022 12:00) (60 - 72)  RR: 17 (10 Lei 2022 12:00) (14 - 21)  SpO2: 100% (10 Lei 2022 12:00) (98% - 100%)      REVIEW OF SYSTEMS:  Could not be obtained secondary to the patient being nonverbal.    PHYSICAL EXAMINATION:    HEENT:  Head:  Normocephalic.  Eyes:  No scleral icterus.  Ears:  Hard to evaluate secondary to the patient being nonverbal.  NECK:  Had increased tone, tracheostomy was in place.  RESPIRATORY:  Decreased breath sounds bilaterally.  CARDIOVASCULAR:  S1 and S2 heard.  ABDOMEN:  Soft and nontender.  EXTREMITIES:  No clubbing or cyanosis was noted.      NEUROLOGIC:  The patient was resting in bed with eyes open.  Upon stimulation of the patient, her eyes would roll up,   Speech:  Nonverbal.  Tone in all four extremities increased.  Bilateral upper extremities were in a flexed position.  Bilateral lower extremities were in the straight position.                  LABS:  CBC Full  -  ( 10 Lei 2022 06:07 )  WBC Count : 8.60 K/uL  RBC Count : 3.27 M/uL  Hemoglobin : 8.2 g/dL  Hematocrit : 27.0 %  Platelet Count - Automated : 184 K/uL  Mean Cell Volume : 82.6 fl  Mean Cell Hemoglobin : 25.1 pg  Mean Cell Hemoglobin Concentration : 30.4 gm/dL  Auto Neutrophil # : 5.85 K/uL  Auto Lymphocyte # : 1.64 K/uL  Auto Monocyte # : 0.67 K/uL  Auto Eosinophil # : 0.10 K/uL  Auto Basophil # : 0.03 K/uL  Auto Neutrophil % : 68.0 %  Auto Lymphocyte % : 19.1 %  Auto Monocyte % : 7.8 %  Auto Eosinophil % : 1.2 %  Auto Basophil % : 0.3 %      01-10    138  |  97  |  117<H>  ----------------------------<  239<H>  3.3<L>   |  33<H>  |  1.51<H>    Ca    8.6      10 Lei 2022 06:07  Phos  3.0     01-10  Mg     2.6     01-10    TPro  6.6  /  Alb  1.9<L>  /  TBili  0.3  /  DBili  x   /  AST  98<H>  /  ALT  100<H>  /  AlkPhos  156<H>  01-10    Hemoglobin A1C:     LIVER FUNCTIONS - ( 10 Lei 2022 06:07 )  Alb: 1.9 g/dL / Pro: 6.6 g/dL / ALK PHOS: 156 U/L / ALT: 100 U/L DA / AST: 98 U/L / GGT: x           Vitamin B12         RADIOLOGY      ANALYSIS AND PLAN:  This is a 76-year-old with history of abnormal movements and altered mental status.  For altered mental status, most likely metabolic encephalopathy secondary to underlying renal insufficiency.  For history of abnormal movements, the patient has been evaluated in the past for this, deemed nonepileptic as per my conversation with spouse, who refused EEG monitoring, did not want any antiseizure medications.  The patient was tried on muscle relaxants in the past, but that does not seem to help with her tone and may have a paradoxical effect of lowering blood pressure.  For now, I will recommend continuing her current medications.  For history of diabetes, strict control of blood sugars.  The patient's spouse's name is Marciano, telephone number is [phone].  Greater than 40 minutes of time was spent with the patient, plan of care, reviewing data, and speaking to multidisciplinary healthcare team.    Thank you for the courtesy of this consultation.

## 2022-01-10 NOTE — PROGRESS NOTE ADULT - ASSESSMENT
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 Admitted on 1/7 w/ abnormal lab values, RYAN, hyperkalemia and hyponatremia.       Plan:  Neuro: Mentation at baseline   CV: Hypotension, resolved. Midodrine stopped   Resp: Vent dependent respiratory failure, s/p trach. Titrating FiO2 to maintain O2 sat >92%. On baseline settings. Vent bundle in place  GI: Tolerating tube feeds  Renal: RYAN and hyperkalemia, resolved. RYAN back to baseline. Trend BUN/Crt. Avoid nephrotoxins  - Hyponatremia, rapidly corrected on admission. Serum Na normal.  Trend BMP.   ID: Observing off abx. No evidence of infectious etiology. Cultures w/ no growth to date.   Heme: Subq heparin for DVT ppx

## 2022-01-10 NOTE — PROGRESS NOTE ADULT - ASSESSMENT
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 was noted to have sodium 124, K 7.1, BUN/Cr 216/4.1.  Also noted with WBC 12.7, hgb 10.9.      CT C/A/P showed "patchy groundglass opacities and tree-in-bud nodularity likely related to multifocal infection and distal airways impaction similar when compared with prior study, although increased consolidation volume loss in the right lower lobe when compared with prior examination.  Improvement in small left and trace right pleural effusion.  No hydronephrosis.  Undistended bladder, apparent bladder wall thickening, correlate with urinalysis to exclude UTI."      RECOMMENDATIONS  Lung exam unremarkable, no purulent secretions, UA relatively bland, no leukocytosis on our labs    -recommend observation off abx and focus on electrolyte disturbances.no suggestion of acute infection such as fever, leukocytosis.    Thank you for consulting us and involving us in the management of this most interesting and challenging case.  We will follow along in the care of this patient. Please call us at [phone] or text me directly on my cell# at [phone] with any concerns.

## 2022-01-10 NOTE — PROGRESS NOTE ADULT - SUBJECTIVE AND OBJECTIVE BOX
Date/Time Patient Seen:  		  Referring MD:   Data Reviewed	       Patient is a 76y old  Female who presents with a chief complaint of abnormal labs (09 Jan 2022 21:04)      Subjective/HPI     PAST MEDICAL & SURGICAL HISTORY:  Dementia of frontal lobe type    Aphasic stroke    Diabetes mellitus    Respiratory failure    Hypertension    GERD (gastroesophageal reflux disease)    Constipation    Respiratory failure    CVA (cerebral vascular accident)    HTN (hypertension)    DM (diabetes mellitus)    Advanced dementia    COVID-19 virus detected    Quadriplegia    Pneumonia    Type II diabetes mellitus    Hx of appendectomy    Gastrostomy in place    Tracheostomy in place    Tracheostomy tube present    Feeding by G-tube          Medication list         MEDICATIONS  (STANDING):  ALBUTerol    90 MICROgram(s) HFA Inhaler 2 Puff(s) Inhalation every 6 hours  dextrose 40% Gel 15 Gram(s) Oral once  dextrose 5%. 1000 milliLiter(s) (50 mL/Hr) IV Continuous <Continuous>  dextrose 5%. 1000 milliLiter(s) (100 mL/Hr) IV Continuous <Continuous>  dextrose 50% Injectable 25 Gram(s) IV Push once  dextrose 50% Injectable 12.5 Gram(s) IV Push once  dextrose 50% Injectable 25 Gram(s) IV Push once  glucagon  Injectable 1 milliGRAM(s) IntraMuscular once  heparin   Injectable 5000 Unit(s) SubCutaneous every 8 hours  insulin lispro (ADMELOG) corrective regimen sliding scale   SubCutaneous every 6 hours  lactobacillus acidophilus 1 Tablet(s) Oral daily  LORazepam     Tablet 1 milliGRAM(s) Oral every 4 hours  methocarbamol 500 milliGRAM(s) Oral two times a day  midodrine 2.5 milliGRAM(s) Oral every 8 hours  pantoprazole   Suspension 40 milliGRAM(s) Oral daily  povidone iodine 10% Solution 1 Application(s) Topical daily  senna 2 Tablet(s) Oral at bedtime  simethicone 80 milliGRAM(s) Chew every 6 hours    MEDICATIONS  (PRN):  acetaminophen     Tablet .. 650 milliGRAM(s) Oral every 6 hours PRN Mild Pain (1 - 3)  aluminum hydroxide/magnesium hydroxide/simethicone Suspension 30 milliLiter(s) Oral every 4 hours PRN Dyspepsia  melatonin 3 milliGRAM(s) Oral at bedtime PRN Insomnia  ondansetron Injectable 4 milliGRAM(s) IV Push every 8 hours PRN Nausea and/or Vomiting  polyethylene glycol 3350 17 Gram(s) Oral every 12 hours PRN Constipation         Vitals log        ICU Vital Signs Last 24 Hrs  T(C): 36.7 (10 Lei 2022 04:00), Max: 36.8 (09 Jan 2022 20:00)  T(F): 98.1 (10 Lei 2022 04:00), Max: 98.2 (09 Jan 2022 20:00)  HR: 85 (10 Lei 2022 06:00) (65 - 88)  BP: 120/51 (10 Lei 2022 06:00) (96/45 - 120/51)  BP(mean): 72 (10 Lei 2022 06:00) (60 - 72)  ABP: --  ABP(mean): --  RR: 18 (10 Lei 2022 06:00) (14 - 18)  SpO2: 100% (10 Lei 2022 06:00) (98% - 110%)       Mode: AC/ CMV (Assist Control/ Continuous Mandatory Ventilation)  RR (machine): 18  TV (machine): 400  FiO2: 40  PEEP: 5  PS: 10  ITime: 1  MAP: 13  PIP: 36      Input and Output:  I&O's Detail    09 Jan 2022 07:01  -  10 Lei 2022 07:00  --------------------------------------------------------  IN:    Enteral Tube Flush: 500 mL    Glucerna 1.5: 600 mL  Total IN: 1100 mL    OUT:    Indwelling Catheter - Urethral (mL): 2000 mL  Total OUT: 2000 mL    Total NET: -900 mL          Lab Data                        8.2    8.60  )-----------( 184      ( 10 Lei 2022 06:07 )             27.0     01-10    138  |  97  |  117<H>  ----------------------------<  239<H>  3.3<L>   |  33<H>  |  1.51<H>    Ca    8.6      10 Lei 2022 06:07  Phos  3.0     01-10  Mg     2.6     01-10    TPro  6.6  /  Alb  1.9<L>  /  TBili  0.3  /  DBili  x   /  AST  98<H>  /  ALT  100<H>  /  AlkPhos  156<H>  01-10            Review of Systems	      Objective     Physical Examination    heart s1s2  lung dec BS  abd soft      Pertinent Lab findings & Imaging      Annalise:  NO   Adequate UO     I&O's Detail    09 Jan 2022 07:01  -  10 Jan 2022 07:00  --------------------------------------------------------  IN:    Enteral Tube Flush: 500 mL    Glucerna 1.5: 600 mL  Total IN: 1100 mL    OUT:    Indwelling Catheter - Urethral (mL): 2000 mL  Total OUT: 2000 mL    Total NET: -900 mL               Discussed with:     Cultures:	        Radiology

## 2022-01-10 NOTE — PROGRESS NOTE ADULT - SUBJECTIVE AND OBJECTIVE BOX
[facility]ECKHAM    [facility]    Allergies    codeine (Hives)    Intolerances        PAST MEDICAL & SURGICAL HISTORY:  Dementia of frontal lobe type    Aphasic stroke    Diabetes mellitus    Respiratory failure    Hypertension    GERD (gastroesophageal reflux disease)    Constipation    Respiratory failure    CVA (cerebral vascular accident)    HTN (hypertension)    DM (diabetes mellitus)    Advanced dementia    COVID-19 virus detected    Quadriplegia    Pneumonia    Type II diabetes mellitus    Hx of appendectomy    Gastrostomy in place    Tracheostomy in place    Tracheostomy tube present    Feeding by G-tube        FAMILY HISTORY:  No pertinent family history in first degree relatives        Home Medications:  albuterol 90 mcg/inh inhalation aerosol: 2 puff(s) inhaled every 6 hours (08 Dec 2021 16:51)  Bacid (LAC) oral tablet: 2 tab(s) by gastrostomy tube once a day (08 Dec 2021 16:51)  Carafate 1 g/10 mL oral suspension: 10 milliliter(s) by gastrostomy tube 4 times a day (before meals and at bedtime) for 14 days (Started 6/4/21) (08 Dec 2021 16:51)  chlorhexidine 0.12% mucous membrane liquid: 15 milliliter(s) mucous membrane 2 times a day (08 Dec 2021 16:51)  insulin lispro 100 units/mL injectable solution: injectable 3 times a day ; sliding scale coverage  (14 Dec 2021 10:38)  ipratropium-albuterol 0.5 mg-2.5 mg/3 mL inhalation solution: 3 milliliter(s) inhaled 4 times a day (08 Dec 2021 16:51)  LORazepam 1 mg oral tablet: 1 tab(s) orally every 4 hours (20 Dec 2021 08:32)  methocarbamol 500 mg oral tablet: 1 tab(s) orally 2 times a day (14 Dec 2021 19:18)  pantoprazole 40 mg oral granule, delayed release: 40 milligram(s) by gastrostomy tube 2 times a day (08 Dec 2021 16:51)  polyethylene glycol 3350 oral powder for reconstitution: 17 gram(s) orally every 12 hours (08 Dec 2021 16:51)  povidone iodine 10% topical solution: 1 application topically once a day (09 Jan 2022 15:42)  ProAir HFA 90 mcg/inh inhalation aerosol: 2 puff(s) inhaled every 6 hours (08 Dec 2021 16:51)  senna 8.6 mg oral tablet: 3 tab(s) by gastrostomy tube once a day (at bedtime) (08 Dec 2021 16:51)  simethicone 80 mg oral tablet, chewable: 1 tab(s) orally every 6 hours (08 Dec 2021 16:51)  Tylenol 325 mg oral tablet: 2 tab(s) by gastrostomy tube once a day; 60 minutes prior to dressing change  (08 Dec 2021 16:51)      MEDICATIONS  (STANDING):  ALBUTerol    90 MICROgram(s) HFA Inhaler 2 Puff(s) Inhalation every 6 hours  dextrose 40% Gel 15 Gram(s) Oral once  dextrose 5%. 1000 milliLiter(s) (50 mL/Hr) IV Continuous <Continuous>  dextrose 5%. 1000 milliLiter(s) (100 mL/Hr) IV Continuous <Continuous>  dextrose 50% Injectable 25 Gram(s) IV Push once  dextrose 50% Injectable 12.5 Gram(s) IV Push once  dextrose 50% Injectable 25 Gram(s) IV Push once  glucagon  Injectable 1 milliGRAM(s) IntraMuscular once  heparin   Injectable 5000 Unit(s) SubCutaneous every 8 hours  insulin lispro (ADMELOG) corrective regimen sliding scale   SubCutaneous every 6 hours  lactobacillus acidophilus 1 Tablet(s) Oral daily  LORazepam     Tablet 1 milliGRAM(s) Oral every 4 hours  methocarbamol 500 milliGRAM(s) Oral two times a day  midodrine 2.5 milliGRAM(s) Oral every 8 hours  pantoprazole   Suspension 40 milliGRAM(s) Oral daily  povidone iodine 10% Solution 1 Application(s) Topical daily  senna 2 Tablet(s) Oral at bedtime  simethicone 80 milliGRAM(s) Chew every 6 hours    MEDICATIONS  (PRN):  acetaminophen     Tablet .. 650 milliGRAM(s) Oral every 6 hours PRN Mild Pain (1 - 3)  aluminum hydroxide/magnesium hydroxide/simethicone Suspension 30 milliLiter(s) Oral every 4 hours PRN Dyspepsia  melatonin 3 milliGRAM(s) Oral at bedtime PRN Insomnia  ondansetron Injectable 4 milliGRAM(s) IV Push every 8 hours PRN Nausea and/or Vomiting  polyethylene glycol 3350 17 Gram(s) Oral every 12 hours PRN Constipation      Diet, NPO:   Tube Feeding Modality: Gastrostomy  Glucerna 1.5 Horacio  Total Volume for 24 Hours (mL): 1200  Continuous  Starting Tube Feed Rate mL per Hour: 50  Until Goal Tube Feed Rate (mL per Hour): 50  Tube Feed Duration (in Hours): 24  Tube Feed Start Time: 16:00  Free Water Flush  Bolus   Total Volume per Flush (mL): 200   Frequency: Every 4 Hours (01-07-22 @ 17:17) [Active]          Vital Signs Last 24 Hrs  T(C): 36.7 (10 Lei 2022 04:00), Max: 36.8 (09 Jan 2022 20:00)  T(F): 98.1 (10 Lei 2022 04:00), Max: 98.2 (09 Jan 2022 20:00)  HR: 80 (10 Lei 2022 08:00) (69 - 88)  BP: 112/48 (10 Lei 2022 08:00) (96/45 - 120/51)  BP(mean): 66 (10 Lei 2022 08:00) (60 - 72)  RR: 18 (10 Lei 2022 08:00) (14 - 18)  SpO2: 100% (10 Lei 2022 08:00) (98% - 100%)      01-09-22 @ 07:01  -  01-10-22 @ 07:00  --------------------------------------------------------  IN: 1100 mL / OUT: 2000 mL / NET: -900 mL        Mode: AC/ CMV (Assist Control/ Continuous Mandatory Ventilation), RR (machine): 18, TV (machine): 400, FiO2: 40, PEEP: 5, PS: 10, ITime: 1, MAP: 12, PIP: 27      LABS:                        8.2    8.60  )-----------( 184      ( 10 Lei 2022 06:07 )             27.0     01-10    138  |  97  |  117<H>  ----------------------------<  239<H>  3.3<L>   |  33<H>  |  1.51<H>    Ca    8.6      10 Lei 2022 06:07  Phos  3.0     01-10  Mg     2.6     01-10    TPro  6.6  /  Alb  1.9<L>  /  TBili  0.3  /  DBili  x   /  AST  98<H>  /  ALT  100<H>  /  AlkPhos  156<H>  01-10              WBC:  WBC Count: 8.60 K/uL (01-10 @ 06:07)  WBC Count: 6.14 K/uL (01-09 @ 07:53)  WBC Count: 5.59 K/uL (01-08 @ 07:07)  WBC Count: 6.92 K/uL (01-07 @ 06:28)  WBC Count: 7.16 K/uL (01-07 @ 00:07)      MICROBIOLOGY:  RECENT CULTURES:  01-07 Clean Catch Clean Catch (Midstream) XXXX XXXX   <10,000 CFU/mL Normal Urogenital Elvira    01-07 .Blood Blood-Peripheral XXXX XXXX   No growth to date.                    Sodium:  Sodium, Serum: 138 mmol/L (01-10 @ 06:07)  Sodium, Serum: 136 mmol/L (01-09 @ 07:53)  Sodium, Serum: 132 mmol/L (01-09 @ 00:52)  Sodium, Serum: 137 mmol/L (01-08 @ 17:03)  Sodium, Serum: 134 mmol/L (01-08 @ 07:07)  Sodium, Serum: 128 mmol/L (01-07 @ 22:30)  Sodium, Serum: 131 mmol/L (01-07 @ 16:18)  Sodium, Serum: 127 mmol/L (01-07 @ 06:28)  Sodium, Serum: 123 mmol/L (01-07 @ 00:07)  Sodium, Serum: 119 mmol/L (01-06 @ 21:21)      1.51 mg/dL 01-10 @ 06:07  1.67 mg/dL 01-09 @ 07:53  1.94 mg/dL 01-09 @ 00:52  2.21 mg/dL 01-08 @ 17:03  2.66 mg/dL 01-08 @ 07:07  3.08 mg/dL 01-07 @ 22:30  3.41 mg/dL 01-07 @ 16:18  3.90 mg/dL 01-07 @ 06:28  4.23 mg/dL 01-07 @ 00:07  4.47 mg/dL 01-06 @ 21:21      Hemoglobin:  Hemoglobin: 8.2 g/dL (01-10 @ 06:07)  Hemoglobin: 8.8 g/dL (01-09 @ 07:53)  Hemoglobin: 7.9 g/dL (01-08 @ 07:07)  Hemoglobin: 9.1 g/dL (01-07 @ 06:28)  Hemoglobin: 8.7 g/dL (01-07 @ 00:07)      Platelets: Platelet Count - Automated: 184 K/uL (01-10 @ 06:07)  Platelet Count - Automated: 155 K/uL (01-09 @ 07:53)  Platelet Count - Automated: 158 K/uL (01-08 @ 07:07)  Platelet Count - Automated: 94 K/uL (01-07 @ 06:28)  Platelet Count - Automated: 121 K/uL (01-07 @ 00:07)      LIVER FUNCTIONS - ( 10 Lei 2022 06:07 )  Alb: 1.9 g/dL / Pro: 6.6 g/dL / ALK PHOS: 156 U/L / ALT: 100 U/L DA / AST: 98 U/L / GGT: x                 RADIOLOGY & ADDITIONAL STUDIES:      MICROBIOLOGY:  RECENT CULTURES:  01-07 Clean Catch Clean Catch (Midstream) XXXX XXXX   <10,000 CFU/mL Normal Urogenital Elvira    01-07 .Blood Blood-Peripheral XXXX XXXX   No growth to date.

## 2022-01-10 NOTE — PROGRESS NOTE ADULT - ASSESSMENT
PARASHARAMI BREA 75 f [facility] S 1/6/2022   DR ILIANA KEMP     REVIEW OF SYMPTOMS      Able to give (reliable) ROS  NO     PHYSICAL EXAM    HEENT Unremarkable  atraumatic   RESP Fair air entry EXP prolonged    Harsh breath sound Resp distres mild   CARDIAC S1 S2 No S3     NO JVD    ABDOMEN SOFT BS PRESENT NOT DISTENDED No hepatosplenomegaly   PEDAL EDEMA present No calf tenderness  NO rash     ______  DOA/CC.  1/6/2022 76 f ho anoxic encephalopathy functional quad trach peg sent from [facility] 1/6/2022 for multiple abn labs ryan   _____                            PROBLEMS POA.  Lacticemia poa 1/6/2022 la 2.6   VDRF Vent dependent resp failure pmh  MI poa Tr 1/6/2022 Tr 109   CHF poa bnp 1/6/2022 bnp 8683   Hyponatremia poa 1/6/2022 Na 119   Hyperkalemia poa 1/6/2022 K 6.1   RYAN poa 1/6/2022 Cr 4.4   ELEVATED LFTS poa 1/6/2022           Rhabdo poa     pmh VDRF sp trach poa   pmh PEG  pmh spasms  pmh anoxic encephalopathy    _____                            COVID STATUS. scv2 1/6/2022 (-)  ICU STAY.1/6/2022   GOC.1/6/2022 full code         BEST PRACTICE ISSUES.                                                  HEAD OF BED ELEVATION. Yes  DVT PROPHYLAXIS.    1/7/2022 hpsc   SQUIRES PROPHYLAXIS.        1/7/2022 protonix                                                                                 DIET.          1/7/2022 glucerna 1.5 1200      PATIENT DATA   VITALS/PO/IO/VENT/DRIPS.    1/10/2022 84 100/50   1/10/2022 ac 18/400/5/.4      ASSESSMENT/RECOMMENDATIONS.    HEMODYNAMICS.   Monitor bp Target MAP 65 (+)  Echo 9/8/2021 ECHO n lvsf mild dd pasp 51   1/6/2022 /80   1/7/2022 88/43   1/7/2022 midodrine 2.5 x 3     RESP.   Monitor po Target po 90-95%    OXYGEN REQUIREMENTS.   O2 1/7/2022 O2 0.5     VENT MANAGEMENT.   Vent dependent resp failure pmh  HOB elevation  Target Pplat 35 (-)  Target PO 90-95%  Target pH 730 (+)    INFECTION.   Pt was seen 1/7 by ID Dr Ky Milner deferred    Lacticemia poa   la 1/6-1/7-1/8-1/9/2022 la 2.6 - 2.8 - 2.9- 2.2   a/r   monitor serially    COPD   1/7 albuterol       MI poa   Tr 1/6-1/7/2022 Tr 109 - 76   Monior  elevated trop in setting of ryan may not mean acs    CHF poa   bnp 1/6/2022 bnp 8683         ANEMIA.   Hb 1/7-1/8-1/9-1/10/2022 Hb 9.1 - 7.9 - 8.8  - 8.2     ELEVATED LFTS poa  LFTS  1/6-1/7-1/8-1/9-1/10/2022    - 135-136- 179- 156   - 429-267 - 184- 98    - 137 - 134 - 126 - 100     RO VTE.  V duplx 12/16 v duplx (-)        Hyponatremia poa   Na 1/6-1/7-1/8-1/9/2022 Na 119 - 127 - 134 - 136    RHABDO   CK 1/7-1/8/2022 ck 8440- 4649  ua 1/7/2022 w 11-25 few bact r 6-10 blod lg     FREE WATER.  1/7 fw 200.6    RYAN poa   Cr 1/6-1/7-1/8-1/9-1/10/2022 Cr 4.4 - 3.9 - 2.6 - 1.6- 1.5    1/8/2022 Nonoliguric     TIME SPENT   Over 36 minutes aggregate critical care time spent on encounter; activities included   direct patient care, counseling and/or coordinating care reviewing notes, lab data/ imaging , discussion with multidisciplinary team/ patient  /family and explaining in detail risks, benefits, alternatives  of the recommendations     CHAPINCITO GUIDRY 75 f [facility] S 1/6/2022   DR ILIANA KEMP

## 2022-01-10 NOTE — PROGRESS NOTE ADULT - ASSESSMENT
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  As per paperwork from [facility], patient was noted to have sodium 124, K 7.1, BUN/Cr 216/4.1.  Also noted with WBC 12.7, hgb 10.9.  Sent here for further workup.  No mention of any fevers or vomiting from [facility] (vital signs at [facility] were /60  HR 79  RR 18, 98%  T 98.1F).  As In the ED, patient's triage vitals were /81 HR 80  RR 18, 99% on vent.  Labs were significant for WBC 7, hgb 8.7.  Initial BMP showed sodium of 119, BUN/Cr of 259/4.47 with K 6.1.  Also notable were elevated LFTs (AST//104), lactate 2.6, troponin 109.8.  Patient was given insulin 5 units, D50, and kayexelate for the hyperkalemia.  Nephrology was consulted and did not think the patient needed dialysis.  Repeat BMP showed improvement of sodium (123), K 5.5, BUN/Cr 252/4.23).  CT C/A/P showed "patchy groundglass opacities and tree-in-bud nodularity likely related to multifocal infection and distal airways impaction similar when compared with prior study, although increased consolidation volume loss in the right lower lobe when compared with prior examination.  Improvement in small left and trace right pleural effusion.  No hydronephrosis.  Undistended bladder, apparent bladder wall thickening, correlate with urinalysis to exclude UTI."   (07 Jan 2022 01:07)          hyponatremia please not to correct sodium not more than 6 point in 24 hr , please use d5w to prevent fast correction   will check ua , urine osmolality , urine sodium , urine uric acid , serum sodium , serum osmolality , serum uric acid , f/u with hyponatremia work up , f/u with bmp , monitor i and o      hyperkalemia will give Kayexalate  prn     ACUTE RENAL FAILURE:   Serum creatinine is  is improving   There is no progression . No uremic symptoms  No evidence of anemia .  Fluid status stable.  Will continue to avoid nephrotoxic drugs.  Patient remains asymptomatic.   Continue current therapy.  hold  diuretic.  hold   ACE inhibitor.  hold   ARB.

## 2022-01-10 NOTE — PROGRESS NOTE ADULT - SUBJECTIVE AND OBJECTIVE BOX
[facility] DIVISION of INFECTIOUS DISEASE  Arturo Olivas MD PhD, Haylee Umanzor MD, Andressa Brantley MD, Billy Valenzuela MD, Emil Medellin MD  and providing coverage with Alexa Canas MD and Eusebio Chairez MD  Providing Infectious Disease Consultations at [facility], [facility]    Office# [phone] to schedule follow up appointments  Answering Service for urgent calls or New Consults [phone]  Cell# to text for urgent issues Arturo Olivas [phone]     infectious diseases progress note:    BREA BECKHAM is a 76y y. o. Female patient    No concerning overnight events    Allergies    codeine (Hives)    Intolerances        ANTIBIOTICS/RELEVANT:  antimicrobials    immunologic:    OTHER:  acetaminophen     Tablet .. 650 milliGRAM(s) Oral every 6 hours PRN  ALBUTerol    90 MICROgram(s) HFA Inhaler 2 Puff(s) Inhalation every 6 hours  aluminum hydroxide/magnesium hydroxide/simethicone Suspension 30 milliLiter(s) Oral every 4 hours PRN  dextrose 40% Gel 15 Gram(s) Oral once  dextrose 5%. 1000 milliLiter(s) IV Continuous <Continuous>  dextrose 5%. 1000 milliLiter(s) IV Continuous <Continuous>  dextrose 50% Injectable 25 Gram(s) IV Push once  dextrose 50% Injectable 12.5 Gram(s) IV Push once  dextrose 50% Injectable 25 Gram(s) IV Push once  glucagon  Injectable 1 milliGRAM(s) IntraMuscular once  heparin   Injectable 5000 Unit(s) SubCutaneous every 8 hours  insulin glargine Injectable (LANTUS) 5 Unit(s) SubCutaneous at bedtime  insulin lispro (ADMELOG) corrective regimen sliding scale   SubCutaneous every 6 hours  lactobacillus acidophilus 1 Tablet(s) Oral daily  LORazepam     Tablet 1 milliGRAM(s) Oral every 4 hours  melatonin 3 milliGRAM(s) Oral at bedtime PRN  methocarbamol 500 milliGRAM(s) Oral two times a day  pantoprazole   Suspension 40 milliGRAM(s) Oral daily  polyethylene glycol 3350 17 Gram(s) Oral every 12 hours PRN  povidone iodine 10% Solution 1 Application(s) Topical daily  senna 2 Tablet(s) Oral at bedtime  simethicone 80 milliGRAM(s) Chew every 6 hours      Objective:  Vital Signs Last 24 Hrs  T(C): 36.7 (10 Lei 2022 04:00), Max: 36.8 (09 Jan 2022 20:00)  T(F): 98.1 (10 Lei 2022 04:00), Max: 98.2 (09 Jan 2022 20:00)  HR: 83 (10 Lei 2022 12:00) (69 - 88)  BP: 120/45 (10 Lei 2022 12:00) (96/45 - 120/51)  BP(mean): 68 (10 Lei 2022 12:00) (60 - 72)  RR: 17 (10 Lei 2022 12:00) (14 - 21)  SpO2: 100% (10 Lei 2022 12:00) (98% - 100%)    T(C): 36.7 (01-10-22 @ 04:00), Max: 36.8 (01-09-22 @ 20:00)  T(C): 36.7 (01-10-22 @ 04:00), Max: 37 (01-07-22 @ 16:00)  T(C): 36.7 (01-10-22 @ 04:00), Max: 37.1 (01-07-22 @ 08:00)    PHYSICAL EXAM:  HEENT: NC atraumatic  Neck: supple, intubated via trach, some thick brown sputum  Respiratory: no accessory muscle use, breathing comfortably, CTA  Cardiovascular: distant  Gastrointestinal: normal appearing, nondistended  Extremities: no clubbing, no cyanosis,    Mode: AC/ CMV (Assist Control/ Continuous Mandatory Ventilation), RR (machine): 18, TV (machine): 400, FiO2: 40, PEEP: 5, PS: 10, ITime: 1, MAP: 12, PIP: 30    LABS:                          8.2    8.60  )-----------( 184      ( 10 Lei 2022 06:07 )             27.0       WBC  8.60 01-10 @ 06:07  6.14 01-09 @ 07:53  5.59 01-08 @ 07:07  6.92 01-07 @ 06:28  7.16 01-07 @ 00:07      01-10    138  |  97  |  117<H>  ----------------------------<  239<H>  3.3<L>   |  33<H>  |  1.51<H>    Ca    8.6      10 Lei 2022 06:07  Phos  3.0     01-10  Mg     2.6     01-10    TPro  6.6  /  Alb  1.9<L>  /  TBili  0.3  /  DBili  x   /  AST  98<H>  /  ALT  100<H>  /  AlkPhos  156<H>  01-10      Creatinine, Serum: 1.51 mg/dL (01-10-22 @ 06:07)  Creatinine, Serum: 1.67 mg/dL (01-09-22 @ 07:53)  Creatinine, Serum: 1.94 mg/dL (01-09-22 @ 00:52)  Creatinine, Serum: 2.21 mg/dL (01-08-22 @ 17:03)  Creatinine, Serum: 2.66 mg/dL (01-08-22 @ 07:07)  Creatinine, Serum: 3.08 mg/dL (01-07-22 @ 22:30)  Creatinine, Serum: 3.41 mg/dL (01-07-22 @ 16:18)  Creatinine, Serum: 3.90 mg/dL (01-07-22 @ 06:28)  Creatinine, Serum: 4.23 mg/dL (01-07-22 @ 00:07)  Creatinine, Serum: 4.47 mg/dL (01-06-22 @ 21:21)                INFLAMMATORY MARKERS  Auto Neutrophil #: 5.85 K/uL (01-10-22 @ 06:07)  Auto Lymphocyte #: 1.64 K/uL (01-10-22 @ 06:07)  Auto Lymphocyte #: 1.00 K/uL (01-09-22 @ 07:53)  Auto Neutrophil #: 4.46 K/uL (01-09-22 @ 07:53)  Auto Neutrophil #: 5.53 K/uL (01-07-22 @ 06:28)  Auto Lymphocyte #: 0.81 K/uL (01-07-22 @ 06:28)    Lactate, Blood: 2.2 mmol/L (01-09-22 @ 07:51)  Lactate, Blood: 2.9 mmol/L (01-08-22 @ 07:07)  Lactate, Blood: 2.8 mmol/L (01-07-22 @ 01:45)  Lactate, Blood: 2.6 mmol/L (01-06-22 @ 21:21)    Auto Eosinophil #: 0.10 K/uL (01-10-22 @ 06:07)  Auto Eosinophil #: 0.11 K/uL (01-09-22 @ 07:53)  Auto Eosinophil #: 0.04 K/uL (01-07-22 @ 06:28)      Sedimentation Rate, Erythrocyte: 94 mm/Hr (01-07-22 @ 06:28)    Procalcitonin, Serum: 7.28 ng/mL (01-08-22 @ 11:34)  Procalcitonin, Serum: 22.90 ng/mL (01-07-22 @ 16:17)      Creatine Kinase, Serum: 4649 U/L (01-08-22 @ 07:07)  Creatine Kinase, Serum: 8440 U/L (01-07-22 @ 07:03)  Creatine Kinase, Serum: 8776 U/L (01-07-22 @ 00:07)              INR: 0.96 ratio (01-08-22 @ 07:07)          MICROBIOLOGY:              RADIOLOGY & ADDITIONAL STUDIES:

## 2022-01-10 NOTE — PROGRESS NOTE ADULT - SUBJECTIVE AND OBJECTIVE BOX
Chief Complaint: Abnormal labs    Interval Events: No events overnight.    Review of Systems:  General: No fevers, chills, weight gain  Skin: No rashes, color changes  Cardiovascular: No chest pain, orthopnea  Respiratory: No shortness of breath, cough  Gastrointestinal: No nausea, abdominal pain  Genitourinary: No incontinence, pain with urination  Musculoskeletal: No pain, swelling, decreased range of motion  Neurological: No headache, weakness  Psychiatric: No depression, anxiety  Endocrine: No weight gain, increased thirst  All other systems are comprehensively negative.    Physical Exam:  Vital Signs Last 24 Hrs  T(C): 36.7 (10 Lei 2022 04:00), Max: 36.8 (09 Jan 2022 20:00)  T(F): 98.1 (10 Lei 2022 04:00), Max: 98.2 (09 Jan 2022 20:00)  HR: 80 (10 Lei 2022 08:00) (69 - 88)  BP: 112/48 (10 Lei 2022 08:00) (96/45 - 120/51)  BP(mean): 66 (10 Lei 2022 08:00) (60 - 72)  RR: 18 (10 Lei 2022 08:00) (14 - 18)  SpO2: 100% (10 Lei 2022 08:00) (98% - 100%)  General: NAD  HEENT: MMM  Neck: No JVD, no carotid bruit  Lungs: CTAB  CV: RRR, nl S1/S2, no M/R/G  Abdomen: S/NT/ND, +BS  Extremities: No LE edema, no cyanosis  Neuro: AAOx3, non-focal  Skin: No rash    Labs:    01-10    138  |  97  |  117<H>  ----------------------------<  239<H>  3.3<L>   |  33<H>  |  1.51<H>    Ca    8.6      10 Lei 2022 06:07  Phos  3.0     01-10  Mg     2.6     01-10    TPro  6.6  /  Alb  1.9<L>  /  TBili  0.3  /  DBili  x   /  AST  98<H>  /  ALT  100<H>  /  AlkPhos  156<H>  01-10                        8.2    8.60  )-----------( 184      ( 10 Lei 2022 06:07 )             27.0         Telemetry: Sinus rhythm

## 2022-01-10 NOTE — PROGRESS NOTE ADULT - ASSESSMENT
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 for abnormal labs.       Acute renal failure   - possibly 2/2 ATN, no hydronephrosis seen on CT  - Improving   - in SPCU 2/2 vent status  - avoid nephrotoxic meds as much as possible  - as per nephrology -> dialysis not needed at this time  - dose meds renally    Hyponatremia    - Improving   discussed with nephro, will continue to monitor for now     Hyperkalemia  - given kayexelate on admission  - monitor with BMP    Elevated LFTs   - unclear etiology, possibly from dehydration?, CT does not point to any specific etiology  - Improving   - monitor LFTs  - GI following     Elevated troponins   - likely 2/2 renal failure  - downtrending  - monitor telemetry  - cardiology consult appreciated    Chronic resp failure/COPD  - maintain vent settings, goal of SaO2 >95%  - cont with nebs  - pulmonary following     Elevated lactate  - would not start on any abx at this time (no leukocytosis, no fevers, CT not that impressive compared to previous CT)  - repeat lactate downtrending  - BCX and Ucxr- NGTD     Pressure ulcer   Sacral region deep tissue pressure injury (DTPI)   5 x 3 periwound with non-blanchable  erythema 5 x 3 per wound care RN  Wound care reccs appreciated    Dementia/Restlessness  - continue with ativan 1mg q4h standing  - palliative care following    DM2   - was on lantus 36units at [facility] but during previous admissions, she was only on coverage scale, requiring minimal coverage at times  - Started Lantus 5 units qhs for now, BG was running higher due to D5 and patient had episodes of hypoglycemia on previous admissions   - on Glucerna 1.5     Advanced Illness, Protein-Calorie Malnutrition  - Nutrition consult  - turn and position q2    Preventive measures  - heparin for DVT ppx    Updated  over phone today. All questions answered to the best of my ability   PT is FULL CODE.     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       Acute renal failure   - possibly 2/2 ATN, no hydronephrosis seen on CT  - Improving   - in SPCU 2/2 vent status  - avoid nephrotoxic meds as much as possible  - as per nephrology -> dialysis not needed at this time  - dose meds renally  - start trial of void today    Hypotension   - Stopped midodrine     Hyponatremia    - Improving   discussed with nephro, will continue to monitor for now     Hyperkalemia  - given kayexelate on admission  - monitor with BMP    Elevated LFTs   - unclear etiology, possibly from dehydration?, CT does not point to any specific etiology  - Improving   - monitor LFTs  - GI following     Elevated troponins   - likely 2/2 renal failure  - downtrending  - monitor telemetry  - cardiology consult appreciated    Chronic resp failure/COPD  - maintain vent settings, goal of SaO2 >95%  - cont with nebs  - pulmonary following     Elevated lactate  - would not start on any abx at this time (no leukocytosis, no fevers, CT not that impressive compared to previous CT)  - repeat lactate downtrending  - BCX and Ucxr- NGTD     Pressure ulcer   Sacral region deep tissue pressure injury (DTPI)   5 x 3 periwound with non-blanchable  erythema 5 x 3 per wound care RN  Wound care reccs appreciated    Dementia/Restlessness  - continue with ativan 1mg q4h standing  - palliative care following    DM2   - was on lantus 36units at [facility] but during previous admissions, she was only on coverage scale, requiring minimal coverage at times  - Started Lantus 5 units qhs for now, BG was running higher due to D5 and patient had episodes of hypoglycemia on previous admissions   - on Glucerna 1.5     Advanced Illness, Protein-Calorie Malnutrition  - Nutrition consult  - turn and position q2    Preventive measures  - heparin for DVT ppx    Updated  over phone today. All questions answered to the best of my ability   PT is FULL CODE.

## 2022-01-10 NOTE — PROGRESS NOTE ADULT - SUBJECTIVE AND OBJECTIVE BOX
Patient is a 76y old  Female who presents with a chief complaint of abnormal labs (10 Lei 2022 11:02)      INTERVAL HPI/OVERNIGHT EVENTS: Patient seen and examined at bedside. No overnight events.      MEDICATIONS  (STANDING):  ALBUTerol    90 MICROgram(s) HFA Inhaler 2 Puff(s) Inhalation every 6 hours  dextrose 40% Gel 15 Gram(s) Oral once  dextrose 5%. 1000 milliLiter(s) (50 mL/Hr) IV Continuous <Continuous>  dextrose 5%. 1000 milliLiter(s) (100 mL/Hr) IV Continuous <Continuous>  dextrose 50% Injectable 25 Gram(s) IV Push once  dextrose 50% Injectable 12.5 Gram(s) IV Push once  dextrose 50% Injectable 25 Gram(s) IV Push once  glucagon  Injectable 1 milliGRAM(s) IntraMuscular once  heparin   Injectable 5000 Unit(s) SubCutaneous every 8 hours  insulin lispro (ADMELOG) corrective regimen sliding scale   SubCutaneous every 6 hours  lactobacillus acidophilus 1 Tablet(s) Oral daily  LORazepam     Tablet 1 milliGRAM(s) Oral every 4 hours  methocarbamol 500 milliGRAM(s) Oral two times a day  midodrine 2.5 milliGRAM(s) Oral every 8 hours  pantoprazole   Suspension 40 milliGRAM(s) Oral daily  povidone iodine 10% Solution 1 Application(s) Topical daily  senna 2 Tablet(s) Oral at bedtime  simethicone 80 milliGRAM(s) Chew every 6 hours    MEDICATIONS  (PRN):  acetaminophen     Tablet .. 650 milliGRAM(s) Oral every 6 hours PRN Mild Pain (1 - 3)  aluminum hydroxide/magnesium hydroxide/simethicone Suspension 30 milliLiter(s) Oral every 4 hours PRN Dyspepsia  melatonin 3 milliGRAM(s) Oral at bedtime PRN Insomnia  ondansetron Injectable 4 milliGRAM(s) IV Push every 8 hours PRN Nausea and/or Vomiting  polyethylene glycol 3350 17 Gram(s) Oral every 12 hours PRN Constipation      Allergies    codeine (Hives)    Intolerances        REVIEW OF SYSTEMS:  Unable to obtain meaningful ROS due to mental status      Vital Signs Last 24 Hrs  T(C): 36.7 (10 Lei 2022 04:00), Max: 36.8 (09 Jan 2022 20:00)  T(F): 98.1 (10 Lei 2022 04:00), Max: 98.2 (09 Jan 2022 20:00)  HR: 80 (10 Lei 2022 08:00) (69 - 88)  BP: 112/48 (10 Lei 2022 08:00) (96/45 - 120/51)  BP(mean): 66 (10 Lei 2022 08:00) (60 - 72)  RR: 18 (10 Lei 2022 08:00) (14 - 18)  SpO2: 100% (10 Lei 2022 08:00) (98% - 100%)    PHYSICAL EXAM:  GENERAL: chronically ill appearing, emaciated, NAD, obtunded  HEAD:  atraumatic  EYES: conjunctiva clear  NECK: +trach in place, clean  RESPIRATORY:  coarse mechanical breath sounds, no gross crackles or rales (+)trach, vent  CARDIOVASCULAR:  regular rate and rhythm, no murmurs or rubs or gallops, 2+ peripheral pulses  GASTROINTESTINAL:  soft, clean and dry as well, mildly distended, PEG site c/d/i  EXTREMITIES: no clubbing or cyanosis or edema  MUSCULOSKELETAL:  +diffuse contractures in all joints  NERVOUS SYSTEM: does not respond to pain  SKIN: necrotic tips of bilateral 1st toes, sacral pressure injury present     LABS:                        8.2    8.60  )-----------( 184      ( 10 Lei 2022 06:07 )             27.0     CBC Full  -  ( 10 Lei 2022 06:07 )  WBC Count : 8.60 K/uL  Hemoglobin : 8.2 g/dL  Hematocrit : 27.0 %  Platelet Count - Automated : 184 K/uL  Mean Cell Volume : 82.6 fl  Mean Cell Hemoglobin : 25.1 pg  Mean Cell Hemoglobin Concentration : 30.4 gm/dL  Auto Neutrophil # : 5.85 K/uL  Auto Lymphocyte # : 1.64 K/uL  Auto Monocyte # : 0.67 K/uL  Auto Eosinophil # : 0.10 K/uL  Auto Basophil # : 0.03 K/uL  Auto Neutrophil % : 68.0 %  Auto Lymphocyte % : 19.1 %  Auto Monocyte % : 7.8 %  Auto Eosinophil % : 1.2 %  Auto Basophil % : 0.3 %    10 Lei 2022 06:07    138    |  97     |  117    ----------------------------<  239    3.3     |  33     |  1.51     Ca    8.6        10 Lei 2022 06:07  Phos  3.0       10 Lei 2022 06:07  Mg     2.6       10 Lei 2022 06:07    TPro  6.6    /  Alb  1.9    /  TBili  0.3    /  DBili  x      /  AST  98     /  ALT  100    /  AlkPhos  156    10 Lei 2022 06:07        CAPILLARY BLOOD GLUCOSE      POCT Blood Glucose.: 219 mg/dL (10 Lei 2022 05:06)  POCT Blood Glucose.: 240 mg/dL (10 Lei 2022 00:41)  POCT Blood Glucose.: 233 mg/dL (09 Jan 2022 17:44)  POCT Blood Glucose.: 241 mg/dL (09 Jan 2022 12:14)        Culture - Urine (collected 01-07-22 @ 13:18)  Source: Clean Catch Clean Catch (Midstream)  Final Report (01-08-22 @ 20:10):    <10,000 CFU/mL Normal Urogenital Elvira    Culture - Blood (collected 01-07-22 @ 13:17)  Source: .Blood Blood-Peripheral  Preliminary Report (01-08-22 @ 14:01):    No growth to date.    Culture - Blood (collected 01-07-22 @ 13:17)  Source: .Blood Blood-Peripheral  Preliminary Report (01-08-22 @ 14:01):    No growth to date.        RADIOLOGY & ADDITIONAL TESTS:    Consultant(s) Notes Reviewed:  [x] YES  [ ] NO    Care Discussed with [x] Consultants  [x] Patient  [ ] Family  [ ]      [ x] Other; RN  DVT ppx

## 2022-01-10 NOTE — PROGRESS NOTE ADULT - SUBJECTIVE AND OBJECTIVE BOX
Patient is a 76y old  Female who presents with a chief complaint of abnormal labs (10 Lei 2022 13:11)      BRIEF HOSPITAL COURSE:     Events last 24 hours: ***    PAST MEDICAL & SURGICAL HISTORY:  Dementia of frontal lobe type    Aphasic stroke    Diabetes mellitus    Respiratory failure    Hypertension    GERD (gastroesophageal reflux disease)    Constipation    Respiratory failure    CVA (cerebral vascular accident)    HTN (hypertension)    DM (diabetes mellitus)    Advanced dementia    COVID-19 virus detected    Quadriplegia    Pneumonia    Type II diabetes mellitus    Hx of appendectomy    Gastrostomy in place    Tracheostomy in place    Tracheostomy tube present    Feeding by G-tube        Review of Systems:  CONSTITUTIONAL: No fever, chills, or fatigue  EYES: No eye pain, visual disturbances, or discharge  ENMT:  No difficulty hearing, tinnitus, vertigo; No sinus or throat pain  NECK: No pain or stiffness  RESPIRATORY: No cough, wheezing, chills or hemoptysis; No shortness of breath  CARDIOVASCULAR: No chest pain, palpitations, dizziness, or leg swelling  GASTROINTESTINAL: No abdominal or epigastric pain. No nausea, vomiting, or hematemesis; No diarrhea or constipation. No melena or hematochezia.  GENITOURINARY: No dysuria, frequency, hematuria, or incontinence  NEUROLOGICAL: No headaches, memory loss, loss of strength, numbness, or tremors  SKIN: No itching, burning, rashes, or lesions   MUSCULOSKELETAL: No joint pain or swelling; No muscle, back, or extremity pain  PSYCHIATRIC: No depression, anxiety, mood swings, or difficulty sleeping      Medications:      ALBUTerol    90 MICROgram(s) HFA Inhaler 2 Puff(s) Inhalation every 6 hours    acetaminophen     Tablet .. 650 milliGRAM(s) Oral every 6 hours PRN  LORazepam     Tablet 1 milliGRAM(s) Oral every 4 hours  melatonin 3 milliGRAM(s) Oral at bedtime PRN  methocarbamol 500 milliGRAM(s) Oral two times a day      heparin   Injectable 5000 Unit(s) SubCutaneous every 8 hours    aluminum hydroxide/magnesium hydroxide/simethicone Suspension 30 milliLiter(s) Oral every 4 hours PRN  pantoprazole   Suspension 40 milliGRAM(s) Oral daily  polyethylene glycol 3350 17 Gram(s) Oral every 12 hours PRN  senna 2 Tablet(s) Oral at bedtime  simethicone 80 milliGRAM(s) Chew every 6 hours      dextrose 40% Gel 15 Gram(s) Oral once  dextrose 50% Injectable 25 Gram(s) IV Push once  dextrose 50% Injectable 25 Gram(s) IV Push once  dextrose 50% Injectable 12.5 Gram(s) IV Push once  glucagon  Injectable 1 milliGRAM(s) IntraMuscular once  insulin glargine Injectable (LANTUS) 5 Unit(s) SubCutaneous at bedtime  insulin lispro (ADMELOG) corrective regimen sliding scale   SubCutaneous every 6 hours    dextrose 5%. 1000 milliLiter(s) IV Continuous <Continuous>  dextrose 5%. 1000 milliLiter(s) IV Continuous <Continuous>      povidone iodine 10% Solution 1 Application(s) Topical daily    lactobacillus acidophilus 1 Tablet(s) Oral daily      Mode: AC/ CMV (Assist Control/ Continuous Mandatory Ventilation)  RR (machine): 18  TV (machine): 400  FiO2: 40  PEEP: 5  ITime: 1  MAP: 11  PIP: 29      ICU Vital Signs Last 24 Hrs  T(C): 36.8 (10 Lei 2022 20:00), Max: 36.8 (10 Lei 2022 20:00)  T(F): 98.2 (10 Lei 2022 20:00), Max: 98.2 (10 Lei 2022 20:00)  HR: 83 (10 Lei 2022 20:51) (69 - 110)  BP: 105/54 (10 Lei 2022 20:00) (96/45 - 163/60)  BP(mean): 69 (10 Lei 2022 20:00) (60 - 89)  ABP: --  ABP(mean): --  RR: 15 (10 Lei 2022 20:00) (15 - 21)  SpO2: 100% (10 Lei 2022 20:51) (98% - 100%)          I&O's Detail    09 Jan 2022 07:01  -  10 Lei 2022 07:00  --------------------------------------------------------  IN:    Enteral Tube Flush: 500 mL    Glucerna 1.5: 600 mL  Total IN: 1100 mL    OUT:    Indwelling Catheter - Urethral (mL): 2000 mL  Total OUT: 2000 mL    Total NET: -900 mL      10 Lei 2022 07:01  -  10 Lei 2022 21:04  --------------------------------------------------------  IN:    Enteral Tube Flush: 500 mL    Glucerna 1.5: 650 mL  Total IN: 1150 mL    OUT:    Indwelling Catheter - Urethral (mL): 500 mL  Total OUT: 500 mL    Total NET: 650 mL            LABS:                        8.2    8.60  )-----------( 184      ( 10 Lei 2022 06:07 )             27.0     01-10    138  |  97  |  117<H>  ----------------------------<  239<H>  3.3<L>   |  33<H>  |  1.51<H>    Ca    8.6      10 Lei 2022 06:07  Phos  3.0     01-10  Mg     2.6     01-10    TPro  6.6  /  Alb  1.9<L>  /  TBili  0.3  /  DBili  x   /  AST  98<H>  /  ALT  100<H>  /  AlkPhos  156<H>  01-10          CAPILLARY BLOOD GLUCOSE      POCT Blood Glucose.: 293 mg/dL (10 Lei 2022 17:47)        CULTURES:  Culture Results:   <10,000 CFU/mL Normal Urogenital Elvira (01-07-22 @ 13:18)  Culture Results:   No growth to date. (01-07-22 @ 13:17)  Culture Results:   No growth to date. (01-07-22 @ 13:17)  Rapid RVP Result: NotDetec (01-06-22 @ 21:21)      Physical Examination:    General: No acute distress.  Alert, oriented, interactive, nonfocal    HEENT: Pupils equal, reactive to light.  Symmetric.    PULM: Clear to auscultation bilaterally, no significant sputum production    CVS: Regular rate and rhythm, no murmurs, rubs, or gallops    ABD: Soft, nondistended, nontender, normoactive bowel sounds, no masses    EXT: No edema, nontender    SKIN: Warm and well perfused, no rashes noted.    NEURO: A&Ox3, strength 5/5 all extremities, cranial nerves grossly intact, no focal deficits    RADIOLOGY: ***    CRITICAL CARE TIME SPENT: ***  Evaluating/treating patient, reviewing data/labs/imaging, discussing case with multidisciplinary team, discussing plan/goals of care with patient/family. Non-inclusive of procedure time.   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 Admitted on 1/7 w/ abnormal lab values, RYAN, hyperkalemia and hyponatremia.     Events last 24 hours: Vent dependent, Mental status at baseline.  Labs abnormalities improving     PAST MEDICAL & SURGICAL HISTORY:  Dementia of frontal lobe type    Aphasic stroke    Diabetes mellitus    Respiratory failure    Hypertension    GERD (gastroesophageal reflux disease)    Constipation    Respiratory failure    CVA (cerebral vascular accident)    HTN (hypertension)    DM (diabetes mellitus)    Advanced dementia    COVID-19 virus detected    Quadriplegia    Pneumonia    Type II diabetes mellitus    Hx of appendectomy    Gastrostomy in place    Tracheostomy in place    Tracheostomy tube present    Feeding by G-tube        Review of Systems:  CUnable to obtain due to clinical condition       Medications:  ALBUTerol    90 MICROgram(s) HFA Inhaler 2 Puff(s) Inhalation every 6 hours  acetaminophen     Tablet .. 650 milliGRAM(s) Oral every 6 hours PRN  LORazepam     Tablet 1 milliGRAM(s) Oral every 4 hours  melatonin 3 milliGRAM(s) Oral at bedtime PRN  methocarbamol 500 milliGRAM(s) Oral two times a day  heparin   Injectable 5000 Unit(s) SubCutaneous every 8 hours  aluminum hydroxide/magnesium hydroxide/simethicone Suspension 30 milliLiter(s) Oral every 4 hours PRN  pantoprazole   Suspension 40 milliGRAM(s) Oral daily  polyethylene glycol 3350 17 Gram(s) Oral every 12 hours PRN  senna 2 Tablet(s) Oral at bedtime  simethicone 80 milliGRAM(s) Chew every 6 hours  dextrose 40% Gel 15 Gram(s) Oral once  dextrose 50% Injectable 25 Gram(s) IV Push once  dextrose 50% Injectable 25 Gram(s) IV Push once  dextrose 50% Injectable 12.5 Gram(s) IV Push once  glucagon  Injectable 1 milliGRAM(s) IntraMuscular once  insulin glargine Injectable (LANTUS) 5 Unit(s) SubCutaneous at bedtime  insulin lispro (ADMELOG) corrective regimen sliding scale   SubCutaneous every 6 hours  dextrose 5%. 1000 milliLiter(s) IV Continuous <Continuous>  dextrose 5%. 1000 milliLiter(s) IV Continuous <Continuous>  povidone iodine 10% Solution 1 Application(s) Topical daily  lactobacillus acidophilus 1 Tablet(s) Oral daily      Mode: AC/ CMV (Assist Control/ Continuous Mandatory Ventilation)  RR (machine): 18  TV (machine): 400  FiO2: 40  PEEP: 5  ITime: 1  MAP: 11  PIP: 29      ICU Vital Signs Last 24 Hrs  T(C): 36.8 (10 Lei 2022 20:00), Max: 36.8 (10 Lei 2022 20:00)  T(F): 98.2 (10 Lei 2022 20:00), Max: 98.2 (10 Lei 2022 20:00)  HR: 83 (10 Lei 2022 20:51) (69 - 110)  BP: 105/54 (10 Lei 2022 20:00) (96/45 - 163/60)  BP(mean): 69 (10 Lei 2022 20:00) (60 - 89)  RR: 15 (10 Lei 2022 20:00) (15 - 21)  SpO2: 100% (10 Lei 2022 20:51) (98% - 100%)          I&O's Detail    09 Jan 2022 07:01  -  10 Lei 2022 07:00  --------------------------------------------------------  IN:    Enteral Tube Flush: 500 mL    Glucerna 1.5: 600 mL  Total IN: 1100 mL    OUT:    Indwelling Catheter - Urethral (mL): 2000 mL  Total OUT: 2000 mL    Total NET: -900 mL      10 Lei 2022 07:01  -  10 Lei 2022 21:04  --------------------------------------------------------  IN:    Enteral Tube Flush: 500 mL    Glucerna 1.5: 650 mL  Total IN: 1150 mL    OUT:    Indwelling Catheter - Urethral (mL): 500 mL  Total OUT: 500 mL    Total NET: 650 mL            LABS:                        8.2    8.60  )-----------( 184      ( 10 Lei 2022 06:07 )             27.0     01-10    138  |  97  |  117<H>  ----------------------------<  239<H>  3.3<L>   |  33<H>  |  1.51<H>    Ca    8.6      10 Lei 2022 06:07  Phos  3.0     01-10  Mg     2.6     01-10    TPro  6.6  /  Alb  1.9<L>  /  TBili  0.3  /  DBili  x   /  AST  98<H>  /  ALT  100<H>  /  AlkPhos  156<H>  01-10          CAPILLARY BLOOD GLUCOSE      POCT Blood Glucose.: 293 mg/dL (10 Lei 2022 17:47)        CULTURES:  Culture Results:   <10,000 CFU/mL Normal Urogenital Elvira (01-07-22 @ 13:18)  Culture Results:   No growth to date. (01-07-22 @ 13:17)  Culture Results:   No growth to date. (01-07-22 @ 13:17)  Rapid RVP Result: NotDetec (01-06-22 @ 21:21)      Physical Examination:    General: No acute distress.      HEENT: Pupils equal, reactive to light.  Symmetric.    PULM: Clear to auscultation bilaterally, no significant sputum production    CVS: Regular rate and rhythm, no murmurs, rubs, or gallops    ABD: Soft, nondistended, nontender, normoactive bowel sounds, no masses    EXT: No edema, nontender    SKIN: Warm and well perfused, no rashes noted.    NEURO: Not following commands.     RADIOLOGY: < from: CT Chest No Cont (01.06.22 @ 23:39) >  ACC: [id number] EXAM:  CT ABDOMEN AND PELVIS                        ACC: [id number] EXAM:  CT CHEST                          PROCEDURE DATE:  01/06/2022          INTERPRETATION:  CLINICAL INFORMATION: Respiratory failure, renal failure    COMPARISON: CT chest, abdomen pelvis 12/8/2021    CONTRAST/COMPLICATIONS:  IV Contrast: NONE  Oral Contrast: NONE  Complications: None reported at time of study completion    PROCEDURE:  CT of the Chest, Abdomen and Pelvis was performed.  Sagittal and coronal reformats were performed.    FINDINGS:  CHEST:  LUNGS AND LARGE AIRWAYS: Tracheostomy tube in place, with trace layering   secretions in the proximal trachea. Increased consolidation and volume   loss in the right lower lobe when compared with the prior study. Patchy   bilateral groundglass opacities and centrilobular tree-in-bud nodularity,   similar when compared with the previous examination. Redemonstration of   calcified material in the right lower lobe likely representing aspirated   contents.  PLEURA: Trace right and small left pleural effusions decreased since the   prior study.  VESSELS: Atherosclerotic calcifications of the aorta and coronary   arteries.  HEART: Heart size is normal. Trace pericardial effusion. Mitral annular   calcifications.  MEDIASTINUM AND JHONNY: Improvement in mediastinal and bilateral hilar   lymphadenopathy.  CHEST WALL AND LOWER NECK: Patulous air-filled proximal esophagus.    ABDOMEN AND PELVIS:  LIVER: Enlarged. No focal lesions.  BILE DUCTS: Normal caliber.  GALLBLADDER: Cholelithiasis.  SPLEEN: Top normal in size.  PANCREAS: Within normal limits.  ADRENALS: Nonspecific mild adrenal gland thickening bilaterally.  KIDNEYS/URETERS: Punctate nonobstructing bilateral renal calculi. No   hydronephrosis, significant perinephric inflammation or fluid collection.    BLADDER: Mildly distended, with mild circumferential wall thickening. Few   punctate calculi within the posterior aspect of the bladder.  REPRODUCTIVE ORGANS: No adnexal masses.    BOWEL: Gastrostomy tube terminates in the stomach. Oral contrast is seen   within the stomach and within small and large bowel loops. Slightly   improved rectal dilatation. No bowel obstruction. Appendix is surgically   absent.  PERITONEUM: Trace ascites. No free air.  VESSELS: Atherosclerotic changes.  RETROPERITONEUM/LYMPH NODES: No lymphadenopathy.  ABDOMINAL WALL: Mild diffuse subcutaneous edema.  BONES: Diffuse osteopenia. Stable chronic fracture deformity of the left   femur with surrounding callus. Multilevel degenerative changes of the   spine.    IMPRESSION:  Patchy groundglass opacities and tree-in-bud nodularity likely related to   multifocal infection and distal airways impaction, similar when compared   with the prior study, although increasedconsolidation volume loss in the   right lower lobe when compared with the prior examination. Improvement in   small left and trace right pleural effusions.    No hydronephrosis. Underdistended bladder, apparent bladder wall   thickening, correlate with urinalysis to exclude UTI.    < end of copied text >      Evaluating/treating patient, reviewing data/labs/imaging, discussing case with multidisciplinary team, discussing plan/goals of care with patient/family. Non-inclusive of procedure time.

## 2022-01-11 ENCOUNTER — TRANSCRIPTION ENCOUNTER (OUTPATIENT)
Age: 77
End: 2022-01-11

## 2022-01-11 VITALS
RESPIRATION RATE: 20 BRPM | SYSTOLIC BLOOD PRESSURE: 109 MMHG | OXYGEN SATURATION: 100 % | DIASTOLIC BLOOD PRESSURE: 57 MMHG | HEART RATE: 109 BPM

## 2022-01-11 LAB
ALBUMIN SERPL ELPH-MCNC: 2 G/DL — LOW (ref 3.3–5)
ALP SERPL-CCNC: 155 U/L — HIGH (ref 30–120)
ALT FLD-CCNC: 90 U/L DA — HIGH (ref 10–60)
ANION GAP SERPL CALC-SCNC: 6 MMOL/L — SIGNIFICANT CHANGE UP (ref 5–17)
AST SERPL-CCNC: 66 U/L — HIGH (ref 10–40)
BILIRUB SERPL-MCNC: 0.4 MG/DL — SIGNIFICANT CHANGE UP (ref 0.2–1.2)
BUN SERPL-MCNC: 90 MG/DL — HIGH (ref 7–23)
CALCIUM SERPL-MCNC: 8.4 MG/DL — SIGNIFICANT CHANGE UP (ref 8.4–10.5)
CHLORIDE SERPL-SCNC: 103 MMOL/L — SIGNIFICANT CHANGE UP (ref 96–108)
CO2 SERPL-SCNC: 32 MMOL/L — HIGH (ref 22–31)
CREAT SERPL-MCNC: 1.46 MG/DL — HIGH (ref 0.5–1.3)
GLUCOSE SERPL-MCNC: 251 MG/DL — HIGH (ref 70–99)
HCT VFR BLD CALC: 28.7 % — LOW (ref 34.5–45)
HGB BLD-MCNC: 8.6 G/DL — LOW (ref 11.5–15.5)
MCHC RBC-ENTMCNC: 25.4 PG — LOW (ref 27–34)
MCHC RBC-ENTMCNC: 30 GM/DL — LOW (ref 32–36)
MCV RBC AUTO: 84.9 FL — SIGNIFICANT CHANGE UP (ref 80–100)
NRBC # BLD: 0 /100 WBCS — SIGNIFICANT CHANGE UP (ref 0–0)
PLATELET # BLD AUTO: 306 K/UL — SIGNIFICANT CHANGE UP (ref 150–400)
POTASSIUM SERPL-MCNC: 4.2 MMOL/L — SIGNIFICANT CHANGE UP (ref 3.5–5.3)
POTASSIUM SERPL-SCNC: 4.2 MMOL/L — SIGNIFICANT CHANGE UP (ref 3.5–5.3)
PROT SERPL-MCNC: 6 G/DL — SIGNIFICANT CHANGE UP (ref 6–8.3)
RBC # BLD: 3.38 M/UL — LOW (ref 3.8–5.2)
RBC # FLD: 15.9 % — HIGH (ref 10.3–14.5)
SARS-COV-2 RNA SPEC QL NAA+PROBE: SIGNIFICANT CHANGE UP
SODIUM SERPL-SCNC: 141 MMOL/L — SIGNIFICANT CHANGE UP (ref 135–145)
WBC # BLD: 9.57 K/UL — SIGNIFICANT CHANGE UP (ref 3.8–10.5)
WBC # FLD AUTO: 9.57 K/UL — SIGNIFICANT CHANGE UP (ref 3.8–10.5)

## 2022-01-11 PROCEDURE — 82962 GLUCOSE BLOOD TEST: CPT

## 2022-01-11 PROCEDURE — 84550 ASSAY OF BLOOD/URIC ACID: CPT

## 2022-01-11 PROCEDURE — 71250 CT THORAX DX C-: CPT | Mod: MA

## 2022-01-11 PROCEDURE — 83930 ASSAY OF BLOOD OSMOLALITY: CPT

## 2022-01-11 PROCEDURE — 84145 PROCALCITONIN (PCT): CPT

## 2022-01-11 PROCEDURE — 85610 PROTHROMBIN TIME: CPT

## 2022-01-11 PROCEDURE — 84300 ASSAY OF URINE SODIUM: CPT

## 2022-01-11 PROCEDURE — 83735 ASSAY OF MAGNESIUM: CPT

## 2022-01-11 PROCEDURE — 94799 UNLISTED PULMONARY SVC/PX: CPT

## 2022-01-11 PROCEDURE — 36415 COLL VENOUS BLD VENIPUNCTURE: CPT

## 2022-01-11 PROCEDURE — 80048 BASIC METABOLIC PNL TOTAL CA: CPT

## 2022-01-11 PROCEDURE — 82550 ASSAY OF CK (CPK): CPT

## 2022-01-11 PROCEDURE — 83935 ASSAY OF URINE OSMOLALITY: CPT

## 2022-01-11 PROCEDURE — 82248 BILIRUBIN DIRECT: CPT

## 2022-01-11 PROCEDURE — 99291 CRITICAL CARE FIRST HOUR: CPT | Mod: 25

## 2022-01-11 PROCEDURE — 87040 BLOOD CULTURE FOR BACTERIA: CPT

## 2022-01-11 PROCEDURE — 99239 HOSP IP/OBS DSCHRG MGMT >30: CPT

## 2022-01-11 PROCEDURE — 74176 CT ABD & PELVIS W/O CONTRAST: CPT | Mod: MA

## 2022-01-11 PROCEDURE — 0225U NFCT DS DNA&RNA 21 SARSCOV2: CPT

## 2022-01-11 PROCEDURE — 85027 COMPLETE CBC AUTOMATED: CPT

## 2022-01-11 PROCEDURE — 84100 ASSAY OF PHOSPHORUS: CPT

## 2022-01-11 PROCEDURE — 71045 X-RAY EXAM CHEST 1 VIEW: CPT

## 2022-01-11 PROCEDURE — 81001 URINALYSIS AUTO W/SCOPE: CPT

## 2022-01-11 PROCEDURE — 80053 COMPREHEN METABOLIC PANEL: CPT

## 2022-01-11 PROCEDURE — 93005 ELECTROCARDIOGRAM TRACING: CPT

## 2022-01-11 PROCEDURE — 87086 URINE CULTURE/COLONY COUNT: CPT

## 2022-01-11 PROCEDURE — 94640 AIRWAY INHALATION TREATMENT: CPT

## 2022-01-11 PROCEDURE — 86140 C-REACTIVE PROTEIN: CPT

## 2022-01-11 PROCEDURE — 84484 ASSAY OF TROPONIN QUANT: CPT

## 2022-01-11 PROCEDURE — 94003 VENT MGMT INPAT SUBQ DAY: CPT

## 2022-01-11 PROCEDURE — 83605 ASSAY OF LACTIC ACID: CPT

## 2022-01-11 PROCEDURE — 85025 COMPLETE CBC W/AUTO DIFF WBC: CPT

## 2022-01-11 PROCEDURE — 83880 ASSAY OF NATRIURETIC PEPTIDE: CPT

## 2022-01-11 PROCEDURE — 85652 RBC SED RATE AUTOMATED: CPT

## 2022-01-11 PROCEDURE — 94760 N-INVAS EAR/PLS OXIMETRY 1: CPT

## 2022-01-11 PROCEDURE — 87635 SARS-COV-2 COVID-19 AMP PRB: CPT

## 2022-01-11 PROCEDURE — 84560 ASSAY OF URINE/URIC ACID: CPT

## 2022-01-11 RX ORDER — INSULIN GLARGINE 100 [IU]/ML
15 INJECTION, SOLUTION SUBCUTANEOUS AT BEDTIME
Refills: 0 | Status: DISCONTINUED | OUTPATIENT
Start: 2022-01-11 | End: 2022-01-11

## 2022-01-11 RX ADMIN — Medication 1 MILLIGRAM(S): at 02:08

## 2022-01-11 RX ADMIN — SIMETHICONE 80 MILLIGRAM(S): 80 TABLET, CHEWABLE ORAL at 17:26

## 2022-01-11 RX ADMIN — METHOCARBAMOL 500 MILLIGRAM(S): 500 TABLET, FILM COATED ORAL at 17:26

## 2022-01-11 RX ADMIN — Medication 1 MILLIGRAM(S): at 17:26

## 2022-01-11 RX ADMIN — Medication 1 MILLIGRAM(S): at 14:56

## 2022-01-11 RX ADMIN — HEPARIN SODIUM 5000 UNIT(S): 5000 INJECTION INTRAVENOUS; SUBCUTANEOUS at 05:05

## 2022-01-11 RX ADMIN — SIMETHICONE 80 MILLIGRAM(S): 80 TABLET, CHEWABLE ORAL at 05:06

## 2022-01-11 RX ADMIN — Medication 1 APPLICATION(S): at 11:02

## 2022-01-11 RX ADMIN — Medication 1 MILLIGRAM(S): at 12:51

## 2022-01-11 RX ADMIN — PANTOPRAZOLE SODIUM 40 MILLIGRAM(S): 20 TABLET, DELAYED RELEASE ORAL at 11:02

## 2022-01-11 RX ADMIN — ALBUTEROL 2 PUFF(S): 90 AEROSOL, METERED ORAL at 01:18

## 2022-01-11 RX ADMIN — ALBUTEROL 2 PUFF(S): 90 AEROSOL, METERED ORAL at 08:36

## 2022-01-11 RX ADMIN — SIMETHICONE 80 MILLIGRAM(S): 80 TABLET, CHEWABLE ORAL at 11:01

## 2022-01-11 RX ADMIN — Medication 1 MILLIGRAM(S): at 05:07

## 2022-01-11 RX ADMIN — Medication 6: at 11:14

## 2022-01-11 RX ADMIN — METHOCARBAMOL 500 MILLIGRAM(S): 500 TABLET, FILM COATED ORAL at 05:06

## 2022-01-11 RX ADMIN — Medication 4: at 05:05

## 2022-01-11 RX ADMIN — ALBUTEROL 2 PUFF(S): 90 AEROSOL, METERED ORAL at 13:03

## 2022-01-11 RX ADMIN — Medication 1 TABLET(S): at 11:15

## 2022-01-11 RX ADMIN — HEPARIN SODIUM 5000 UNIT(S): 5000 INJECTION INTRAVENOUS; SUBCUTANEOUS at 14:57

## 2022-01-11 RX ADMIN — Medication 1 MILLIGRAM(S): at 10:58

## 2022-01-11 RX ADMIN — Medication 6: at 17:26

## 2022-01-11 NOTE — CHART NOTE - NSCHARTNOTEFT_GEN_A_CORE
Assessment:     Pt seen for nutrition follow-up. 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 As per paperwork from [facility], patient was noted to have sodium 124, K 7.1, BUN/Cr 216/4.1.  Also noted with WBC 12.7, hgb 10.9.  Sent here for further workup. No mention of any fevers or vomiting from [facility] (vital signs at [facility] were /60  HR 79  RR 18, 98%  T 98.1F).  As In the ED, patient's triage vitals were /81 HR 80  RR 18, 99% on vent.  Labs were significant for WBC 7, hgb 8.7.  Initial BMP showed sodium of 119, BUN/Cr of 259/4.47 with K 6.1.  Also notable were elevated LFTs (AST//104), lactate 2.6, troponin 109.8.  Patient was given insulin 5 units, D50, and kayexelate for the hyperkalemia.  Nephrology was consulted and did not think the patient needed dialysis.  Repeat BMP showed improvement of sodium (123), K 5.5, BUN/Cr 252/4.23).  CT C/A/P showed "patchy groundglass opacities and tree-in-bud nodularity likely related to multifocal infection and distal airways impaction similar when compared with prior study, although increased consolidation volume loss in the right lower lobe when compared with prior examination.  Improvement in small left and trace right pleural effusion.  No hydronephrosis. Undistended bladder, apparent bladder wall thickening, correlate with urinalysis to exclude UTI."     Pt admitted from [facility]; per transfer documents, pt was receiving EN feeds via PEG of Glucerna 1.5 @ 50 ml/hr x 20hr for a total volume of 1000 ml/day, providing pt w/ 1500kcal and 82.5g protein per day. Pt also receiving 400cc water flush 4x day, total 1600cc free water/day. Pt currently receiving Glucerna 1.5 via peg @ 50ml/hr x 24 hrs (this is providing 1200ml total volume of formula, 1800kcal, 99g protein). Pt also receiving bolus free water flushes 250ml q 4 hours. Pt currently tolerating tube feeding well. CBW on admission 98#. Per transfer documents from [facility], noted ht of 65in, wt of 98.4#. No edema noted. Reviewed pt's labs, pt admitted w/ acute renal failure. Elevated BUN/Cr and hyperkalemia. If renal function continues to worsen, consider change of tube feeding formula to renal formula, Nepro w/ carb steady. Recommend MVI and vitamin C supplementation to facilitate wound healing. RD to follow-up, will make recommendations pending  hospital course, and will continue to monitor pt's nutrition status.    Pt admitted w/ multiple pressure ulcers; unstageable to sacrum and unstageable to BL 1st toes    Factors impacting intake: [ ] none [ ] nausea  [ ] vomiting [ ] diarrhea [ ] constipation  [ ]chewing problems [ ] swallowing issues  [X] other: peg    Diet Prescription: Diet, NPO:   Tube Feeding Modality: Gastrostomy  Glucerna 1.5 Horacio  Total Volume for 24 Hours (mL): 1200  Continuous  Starting Tube Feed Rate {mL per Hour}: 50  Until Goal Tube Feed Rate (mL per Hour): 50  Tube Feed Duration (in Hours): 24  Tube Feed Start Time: 16:00  Free Water Flush  Bolus   Total Volume per Flush (mL): 200   Frequency: Every 4 Hours (01-07-22 @ 17:17)    Intake: TF at goal rate    Current Weight: Weight (kg): 44.6 (01-06 @ 20:22)  % Weight Change  1/7 100.3#  1/11 100.7#    Pertinent Medications: MEDICATIONS  (STANDING):  ALBUTerol    90 MICROgram(s) HFA Inhaler 2 Puff(s) Inhalation every 6 hours  dextrose 40% Gel 15 Gram(s) Oral once  dextrose 5%. 1000 milliLiter(s) (50 mL/Hr) IV Continuous <Continuous>  dextrose 5%. 1000 milliLiter(s) (100 mL/Hr) IV Continuous <Continuous>  dextrose 50% Injectable 25 Gram(s) IV Push once  dextrose 50% Injectable 12.5 Gram(s) IV Push once  dextrose 50% Injectable 25 Gram(s) IV Push once  glucagon  Injectable 1 milliGRAM(s) IntraMuscular once  heparin   Injectable 5000 Unit(s) SubCutaneous every 8 hours  insulin glargine Injectable (LANTUS) 5 Unit(s) SubCutaneous at bedtime  insulin lispro (ADMELOG) corrective regimen sliding scale   SubCutaneous every 6 hours  lactobacillus acidophilus 1 Tablet(s) Oral daily  LORazepam     Tablet 1 milliGRAM(s) Oral every 4 hours  methocarbamol 500 milliGRAM(s) Oral two times a day  pantoprazole   Suspension 40 milliGRAM(s) Oral daily  povidone iodine 10% Solution 1 Application(s) Topical daily  senna 2 Tablet(s) Oral at bedtime  simethicone 80 milliGRAM(s) Chew every 6 hours    MEDICATIONS  (PRN):  acetaminophen     Tablet .. 650 milliGRAM(s) Oral every 6 hours PRN Mild Pain (1 - 3)  aluminum hydroxide/magnesium hydroxide/simethicone Suspension 30 milliLiter(s) Oral every 4 hours PRN Dyspepsia  melatonin 3 milliGRAM(s) Oral at bedtime PRN Insomnia  polyethylene glycol 3350 17 Gram(s) Oral every 12 hours PRN Constipation    Pertinent Labs: 01-11 Na141 mmol/L Glu 251 mg/dL<H> K+ 4.2 mmol/L Cr  1.46 mg/dL<H> BUN 90 mg/dL<H> 01-10 Phos 3.0 mg/dL 01-11 Alb 2.0 g/dL<L>    CAPILLARY BLOOD GLUCOSE  POCT Blood Glucose.: 263 mg/dL (11 Jan 2022 11:12)  POCT Blood Glucose.: 250 mg/dL (11 Jan 2022 05:03)  POCT Blood Glucose.: 229 mg/dL (10 Lei 2022 22:37)  POCT Blood Glucose.: 293 mg/dL (10 Lei 2022 17:47)  POCT Blood Glucose.: 285 mg/dL (10 Lei 2022 12:04)    Skin: multiple pressure ulcers; DTI to sacrum and unstageable to BL 1st toes    Estimated Needs:   [X] no change since previous assessment  [ ] recalculated:     Previous Nutrition Diagnosis:   [ ] Inadequate Energy Intake [ ]Inadequate Oral Intake [ ] Excessive Energy Intake   [ ] Underweight [X] Increased Nutrient Needs [ ] Overweight/Obesity   [ ] Altered GI Function [ ] Unintended Weight Loss [ ] Food & Nutrition Related Knowledge Deficit [ ] Malnutrition     Nutrition Diagnosis is [X] ongoing  [ ] resolved [ ] not applicable     New Nutrition Diagnosis: [ ] not applicable     Interventions: Continue nutrition care plan, Glucerna 1.5 at goal rate of 50ml/hr, free water flushes  Recommend  [ ] Change Diet To:  [ ] Nutrition Supplement  [ ] Nutrition Support  [ ] Other:     Monitoring and Evaluation:   [ ] PO intake [ x ] Tolerance to EN prescription [ x ] weights [ x ] labs[ x ] follow up per protocol  [ ] other: Assessment:     Pt seen for nutrition follow-up. 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 As per paperwork from [facility], patient was noted to have sodium 124, K 7.1, BUN/Cr 216/4.1.  Also noted with WBC 12.7, hgb 10.9.  Sent here for further workup. No mention of any fevers or vomiting from [facility] (vital signs at [facility] were /60  HR 79  RR 18, 98%  T 98.1F).  As In the ED, patient's triage vitals were /81 HR 80  RR 18, 99% on vent.  Labs were significant for WBC 7, hgb 8.7.  Initial BMP showed sodium of 119, BUN/Cr of 259/4.47 with K 6.1.  Also notable were elevated LFTs (AST//104), lactate 2.6, troponin 109.8.  Patient was given insulin 5 units, D50, and kayexelate for the hyperkalemia.  Nephrology was consulted and did not think the patient needed dialysis.  Repeat BMP showed improvement of sodium (123), K 5.5, BUN/Cr 252/4.23).  CT C/A/P showed "patchy groundglass opacities and tree-in-bud nodularity likely related to multifocal infection and distal airways impaction similar when compared with prior study, although increased consolidation volume loss in the right lower lobe when compared with prior examination.  Improvement in small left and trace right pleural effusion.  No hydronephrosis. Undistended bladder, apparent bladder wall thickening, correlate with urinalysis to exclude UTI."     Pt admitted from [facility] NH; per transfer documents, pt was receiving EN feeds via PEG of Glucerna 1.5 @ 50 ml/hr x 20hr for a total volume of 1000 ml/day, providing pt w/ 1500kcal and 82.5g protein per day. Pt also receiving 400cc water flush 4x day, total 1600cc free water/day. Pt admitted w/ multiple pressure ulcers; DTI to sacrum and unstageable to BL 1st toes. Pt w/ increased energy/protein needs. Pt currently receiving Glucerna 1.5 via peg @ 50ml/hr x 24 hrs, this is providing 1200ml total volume of formula, 1800kcal (based on 40kcal/kg BW), 99g protein (based on 2.2g protein/kg BW). Pt also receiving bolus free water flushes 250ml q 4 hours. Pt currently tolerating tube feeding well. CBW on admission 98#. Per transfer documents from [facility], noted ht of 65in, wt of 98.4#. No edema noted. Recommend MVI and vitamin C supplementation to facilitate wound healing. RD will continue to follow-up and monitor pt's nutrition status.    Factors impacting intake: [ ] none [ ] nausea  [ ] vomiting [ ] diarrhea [ ] constipation  [ ]chewing problems [ ] swallowing issues  [X] other: peg    Diet Prescription: Diet, NPO:   Tube Feeding Modality: Gastrostomy  Glucerna 1.5 Horacio  Total Volume for 24 Hours (mL): 1200  Continuous  Starting Tube Feed Rate {mL per Hour}: 50  Until Goal Tube Feed Rate (mL per Hour): 50  Tube Feed Duration (in Hours): 24  Tube Feed Start Time: 16:00  Free Water Flush  Bolus   Total Volume per Flush (mL): 200   Frequency: Every 4 Hours (01-07-22 @ 17:17)    Intake: TF at goal rate    Current Weight: Weight (kg): 44.6 (01-06 @ 20:22)  % Weight Change  1/7 100.3#  1/11 100.7#    Pertinent Medications: MEDICATIONS  (STANDING):  ALBUTerol    90 MICROgram(s) HFA Inhaler 2 Puff(s) Inhalation every 6 hours  dextrose 40% Gel 15 Gram(s) Oral once  dextrose 5%. 1000 milliLiter(s) (50 mL/Hr) IV Continuous <Continuous>  dextrose 5%. 1000 milliLiter(s) (100 mL/Hr) IV Continuous <Continuous>  dextrose 50% Injectable 25 Gram(s) IV Push once  dextrose 50% Injectable 12.5 Gram(s) IV Push once  dextrose 50% Injectable 25 Gram(s) IV Push once  glucagon  Injectable 1 milliGRAM(s) IntraMuscular once  heparin   Injectable 5000 Unit(s) SubCutaneous every 8 hours  insulin glargine Injectable (LANTUS) 5 Unit(s) SubCutaneous at bedtime  insulin lispro (ADMELOG) corrective regimen sliding scale   SubCutaneous every 6 hours  lactobacillus acidophilus 1 Tablet(s) Oral daily  LORazepam     Tablet 1 milliGRAM(s) Oral every 4 hours  methocarbamol 500 milliGRAM(s) Oral two times a day  pantoprazole   Suspension 40 milliGRAM(s) Oral daily  povidone iodine 10% Solution 1 Application(s) Topical daily  senna 2 Tablet(s) Oral at bedtime  simethicone 80 milliGRAM(s) Chew every 6 hours    MEDICATIONS  (PRN):  acetaminophen     Tablet .. 650 milliGRAM(s) Oral every 6 hours PRN Mild Pain (1 - 3)  aluminum hydroxide/magnesium hydroxide/simethicone Suspension 30 milliLiter(s) Oral every 4 hours PRN Dyspepsia  melatonin 3 milliGRAM(s) Oral at bedtime PRN Insomnia  polyethylene glycol 3350 17 Gram(s) Oral every 12 hours PRN Constipation    Pertinent Labs: 01-11 Na141 mmol/L Glu 251 mg/dL<H> K+ 4.2 mmol/L Cr  1.46 mg/dL<H> BUN 90 mg/dL<H> 01-10 Phos 3.0 mg/dL 01-11 Alb 2.0 g/dL<L>    CAPILLARY BLOOD GLUCOSE  POCT Blood Glucose.: 263 mg/dL (11 Jan 2022 11:12)  POCT Blood Glucose.: 250 mg/dL (11 Jan 2022 05:03)  POCT Blood Glucose.: 229 mg/dL (10 Lei 2022 22:37)  POCT Blood Glucose.: 293 mg/dL (10 Lei 2022 17:47)  POCT Blood Glucose.: 285 mg/dL (10 Lei 2022 12:04)    Skin: multiple pressure ulcers; DTI to sacrum and unstageable to BL 1st toes    Estimated Needs:   [X] no change since previous assessment  [ ] recalculated:     Previous Nutrition Diagnosis:   [ ] Inadequate Energy Intake [ ]Inadequate Oral Intake [ ] Excessive Energy Intake   [ ] Underweight [X] Increased Nutrient Needs [ ] Overweight/Obesity   [ ] Altered GI Function [ ] Unintended Weight Loss [ ] Food & Nutrition Related Knowledge Deficit [ ] Malnutrition     Nutrition Diagnosis is [X] ongoing  [ ] resolved [ ] not applicable     New Nutrition Diagnosis: [ ] not applicable     Interventions: Continue nutrition care plan, Glucerna 1.5 at goal rate of 50ml/hr, free water flushes  Recommend  [ ] Change Diet To:  [ ] Nutrition Supplement  [ ] Nutrition Support  [ ] Other:     Monitoring and Evaluation:   [ ] PO intake [ x ] Tolerance to EN prescription [ x ] weights [ x ] labs[ x ] follow up per protocol  [ ] other:

## 2022-01-11 NOTE — PROGRESS NOTE ADULT - ASSESSMENT
VIRIDIANAAMI BREA 75 f [facility] S 1/6/2022   DR ILIANA KEMP     REVIEW OF SYMPTOMS      Able to give (reliable) ROS  NO     PHYSICAL EXAM    HEENT Unremarkable  atraumatic   RESP Fair air entry EXP prolonged    Harsh breath sound Resp distres mild   CARDIAC S1 S2 No S3     NO JVD    ABDOMEN SOFT BS PRESENT NOT DISTENDED No hepatosplenomegaly   PEDAL EDEMA present No calf tenderness  NO rash     ______  DOA/CC.  1/6/2022 76 f ho anoxic encephalopathy functional quad trach peg sent from [facility] 1/6/2022 for multiple abn labs ryan   _____                            PROBLEMS POA.  Lacticemia poa 1/6/2022 la 2.6   VDRF Vent dependent resp failure pmh  MI poa Tr 1/6/2022 Tr 109   CHF poa bnp 1/6/2022 bnp [id number]   Hyponatremia poa 1/6/2022 Na 119   Hyperkalemia poa 1/6/2022 K 6.1   RYAN poa 1/6/2022 Cr 4.4   ELEVATED LFTS poa 1/6/2022           Rhabdo poa     pmh VDRF sp trach poa   pmh PEG  pmh spasms  pmh anoxic encephalopathy    PROBLEMS.  VDRF.  HYPONATREMIA.   RYAN.   _____                            COVID STATUS. scv2 1/6/2022 (-)  ICU STAY.1/6/2022   GOC.1/6/2022 full code         BEST PRACTICE ISSUES.                                                  HEAD OF BED ELEVATION. Yes  DVT PROPHYLAXIS.    1/7/2022 hpsc   SQUIRSE PROPHYLAXIS.        1/7/2022 protonix                                                                                 DIET.          1/7/2022 glucerna 1.5 1200    INFECTION PROPHYLAXIS.      PATIENT DATA   VITALS/PO/IO/VENT/DRIPS.    1/11/2022 afeb 110 120/60   1/11/2022 ac 18/400/5/.4      ASSESSMENT/RECOMMENDATIONS.    HEMODYNAMICS.   Monitor bp Target MAP 65 (+)  Echo 9/8/2021 ECHO n lvsf mild dd pasp 51   1/6/2022 /80   1/7/2022 88/43   1/7/2022 midodrine 2.5 x 3     RESP.   Monitor po Target po 90-95%    OXYGEN REQUIREMENTS.   O2 1/7/2022 O2 0.5     VENT MANAGEMENT.   Vent dependent resp failure pmh  HOB elevation  Target Pplat 35 (-)  Target PO 90-95%  Target pH 730 (+)    INFECTION.   Pt was seen 1/7 by ID Dr Ky Milner deferred    Hyponatremia poa   Na 1/6-1/7-1/8-1/9-1/11/2022 Na 119 - 127 - 134 - 136-142    RHABDO   CK 1/7-1/8/2022 ck 8440- 4649  ua 1/7/2022 w 11-25 few bact r 6-10 blod lg     FREE WATER.  1/7 fw 200.6    RYAN poa   Cr 1/6-1/7-1/8-1/9-1/10-1/11/2022 Cr 4.4 - 3.9 - 2.6 - 1.6- 1.5-1.4    1/8/2022 Nonoliguric     TIME SPENT   Over 36 minutes aggregate critical care time spent on encounter; activities included   direct patient care, counseling and/or coordinating care reviewing notes, lab data/ imaging , discussion with multidisciplinary team/ patient  /family and explaining in detail risks, benefits, alternatives  of the recommendations     CHAPINCITO GUIDRY 75 f [facility] S 1/6/2022   DR ILIANA KEMP

## 2022-01-11 NOTE — PROGRESS NOTE ADULT - NUTRITIONAL ASSESSMENT
MEDICATIONS  (STANDING):  ALBUTerol    90 MICROgram(s) HFA Inhaler 2 Puff(s) Inhalation every 6 hours  dextrose 40% Gel 15 Gram(s) Oral once  dextrose 5%. 1000 milliLiter(s) (50 mL/Hr) IV Continuous <Continuous>  dextrose 5%. 1000 milliLiter(s) (100 mL/Hr) IV Continuous <Continuous>  dextrose 50% Injectable 25 Gram(s) IV Push once  dextrose 50% Injectable 12.5 Gram(s) IV Push once  dextrose 50% Injectable 25 Gram(s) IV Push once  glucagon  Injectable 1 milliGRAM(s) IntraMuscular once  heparin   Injectable 5000 Unit(s) SubCutaneous every 8 hours  insulin lispro (ADMELOG) corrective regimen sliding scale   SubCutaneous every 6 hours  lactobacillus acidophilus 1 Tablet(s) Oral daily  LORazepam     Tablet 1 milliGRAM(s) Oral every 4 hours  methocarbamol 500 milliGRAM(s) Oral two times a day  midodrine 2.5 milliGRAM(s) Oral every 8 hours  pantoprazole   Suspension 40 milliGRAM(s) Oral daily  senna 2 Tablet(s) Oral at bedtime  simethicone 80 milliGRAM(s) Chew every 6 hours

## 2022-01-11 NOTE — PROGRESS NOTE ADULT - ASSESSMENT
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       electrolyte imbalance noted - vs noted - labs reviewed - ventilated -   dc planning under way  back to [facility]    supportive management  labs reviewed  imaging reviewed  on TF  vent support  GOC documented  full code  Marciano   lives in [facility]

## 2022-01-11 NOTE — PROGRESS NOTE ADULT - ASSESSMENT
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  As per paperwork from [facility], patient was noted to have sodium 124, K 7.1, BUN/Cr 216/4.1.  Also noted with WBC 12.7, hgb 10.9.  Sent here for further workup.  No mention of any fevers or vomiting from [facility] (vital signs at [facility] were /60  HR 79  RR 18, 98%  T 98.1F).  As In the ED, patient's triage vitals were /81 HR 80  RR 18, 99% on vent.  Labs were significant for WBC 7, hgb 8.7.  Initial BMP showed sodium of 119, BUN/Cr of 259/4.47 with K 6.1.  Also notable were elevated LFTs (AST//104), lactate 2.6, troponin 109.8.  Patient was given insulin 5 units, D50, and kayexelate for the hyperkalemia.  Nephrology was consulted and did not think the patient needed dialysis.  Repeat BMP showed improvement of sodium (123), K 5.5, BUN/Cr 252/4.23).  CT C/A/P showed "patchy groundglass opacities and tree-in-bud nodularity likely related to multifocal infection and distal airways impaction similar when compared with prior study, although increased consolidation volume loss in the right lower lobe when compared with prior examination.  Improvement in small left and trace right pleural effusion.  No hydronephrosis.  Undistended bladder, apparent bladder wall thickening, correlate with urinalysis to exclude UTI."   (07 Jan 2022 01:07)          hyponatremia please not to correct sodium not more than 6 point in 24 hr , please use d5w to prevent fast correction   will check ua , urine osmolality , urine sodium , urine uric acid , serum sodium , serum osmolality , serum uric acid , f/u with hyponatremia work up , f/u with bmp , monitor i and o      hyperkalemia will give Kayexalate  prn     ACUTE RENAL FAILURE:   Serum creatinine is  is improving   There is no progression . No uremic symptoms  No evidence of anemia .  Fluid status stable.  Will continue to avoid nephrotoxic drugs.  Patient remains asymptomatic.   Continue current therapy.  hold  diuretic.  hold   ACE inhibitor.  hold   ARB.

## 2022-01-11 NOTE — PROGRESS NOTE ADULT - SUBJECTIVE AND OBJECTIVE BOX
Patient is a 76y Female whom presented to the hospital with manasa    PAST MEDICAL & SURGICAL HISTORY:  Dementia of frontal lobe type    Aphasic stroke    Diabetes mellitus    Respiratory failure    Hypertension    GERD (gastroesophageal reflux disease)    Constipation    Respiratory failure    CVA (cerebral vascular accident)    HTN (hypertension)    DM (diabetes mellitus)    Advanced dementia    COVID-19 virus detected    Quadriplegia    Pneumonia    Type II diabetes mellitus    Hx of appendectomy    Gastrostomy in place    Tracheostomy in place    Tracheostomy tube present    Feeding by G-tube        MEDICATIONS  (STANDING):  ALBUTerol    90 MICROgram(s) HFA Inhaler 2 Puff(s) Inhalation every 6 hours  dextrose 40% Gel 15 Gram(s) Oral once  dextrose 5%. 1000 milliLiter(s) (50 mL/Hr) IV Continuous <Continuous>  dextrose 5%. 1000 milliLiter(s) (100 mL/Hr) IV Continuous <Continuous>  dextrose 50% Injectable 25 Gram(s) IV Push once  dextrose 50% Injectable 12.5 Gram(s) IV Push once  dextrose 50% Injectable 25 Gram(s) IV Push once  glucagon  Injectable 1 milliGRAM(s) IntraMuscular once  heparin   Injectable 5000 Unit(s) SubCutaneous every 8 hours  insulin lispro (ADMELOG) corrective regimen sliding scale   SubCutaneous every 6 hours  lactobacillus acidophilus 1 Tablet(s) Oral daily  LORazepam     Tablet 1 milliGRAM(s) Oral every 4 hours  methocarbamol 500 milliGRAM(s) Oral two times a day  midodrine 2.5 milliGRAM(s) Oral every 8 hours  pantoprazole   Suspension 40 milliGRAM(s) Oral daily  senna 2 Tablet(s) Oral at bedtime  simethicone 80 milliGRAM(s) Chew every 6 hours      Allergies    codeine (Hives)    Intolerances        SOCIAL HISTORY:  Denies ETOh,Smoking,     FAMILY HISTORY:  No pertinent family history in first degree relatives        REVIEW OF SYSTEMS:    unable to obtained a good review system                                                                                              8.6    9.57  )-----------( 306      ( 11 Jan 2022 06:35 )             28.7       CBC Full  -  ( 11 Jan 2022 06:35 )  WBC Count : 9.57 K/uL  RBC Count : 3.38 M/uL  Hemoglobin : 8.6 g/dL  Hematocrit : 28.7 %  Platelet Count - Automated : 306 K/uL  Mean Cell Volume : 84.9 fl  Mean Cell Hemoglobin : 25.4 pg  Mean Cell Hemoglobin Concentration : 30.0 gm/dL  Auto Neutrophil # : x  Auto Lymphocyte # : x  Auto Monocyte # : x  Auto Eosinophil # : x  Auto Basophil # : x  Auto Neutrophil % : x  Auto Lymphocyte % : x  Auto Monocyte % : x  Auto Eosinophil % : x  Auto Basophil % : x      01-11    141  |  103  |  90<H>  ----------------------------<  251<H>  4.2   |  32<H>  |  1.46<H>    Ca    8.4      11 Jan 2022 06:35  Phos  3.0     01-10  Mg     2.6     01-10    TPro  6.0  /  Alb  2.0<L>  /  TBili  0.4  /  DBili  x   /  AST  66<H>  /  ALT  90<H>  /  AlkPhos  155<H>  01-11      CAPILLARY BLOOD GLUCOSE      POCT Blood Glucose.: 271 mg/dL (11 Jan 2022 17:25)  POCT Blood Glucose.: 263 mg/dL (11 Jan 2022 11:12)  POCT Blood Glucose.: 250 mg/dL (11 Jan 2022 05:03)  POCT Blood Glucose.: 229 mg/dL (10 Lei 2022 22:37)      Vital Signs Last 24 Hrs  T(C): 36.6 (11 Jan 2022 16:00), Max: 36.8 (10 Lei 2022 20:00)  T(F): 97.8 (11 Jan 2022 16:00), Max: 98.2 (10 Lei 2022 20:00)  HR: 109 (11 Jan 2022 18:00) (76 - 118)  BP: 109/57 (11 Jan 2022 18:00) (100/52 - 150/61)  BP(mean): 71 (11 Jan 2022 18:00) (66 - 85)  RR: 20 (11 Jan 2022 18:00) (12 - 27)  SpO2: 100% (11 Jan 2022 18:00) (96% - 100%)               PHYSICAL EXAM:    Constitutional: NAD  HEENT: conjunctive   clear   Neck:  No JVD  Respiratory: decrease bs b/l   Cardiovascular: S1 and S2  Gastrointestinal: BS+, soft, pos peg   Extremities: No peripheral edema

## 2022-01-11 NOTE — PROGRESS NOTE ADULT - SUBJECTIVE AND OBJECTIVE BOX
Neurology follow up note    BREA [id number]      Interval History:    Patient awake in bed     Allergies    codeine (Hives)    Intolerances        MEDICATIONS    acetaminophen     Tablet .. 650 milliGRAM(s) Oral every 6 hours PRN  ALBUTerol    90 MICROgram(s) HFA Inhaler 2 Puff(s) Inhalation every 6 hours  aluminum hydroxide/magnesium hydroxide/simethicone Suspension 30 milliLiter(s) Oral every 4 hours PRN  dextrose 40% Gel 15 Gram(s) Oral once  dextrose 5%. 1000 milliLiter(s) IV Continuous <Continuous>  dextrose 5%. 1000 milliLiter(s) IV Continuous <Continuous>  dextrose 50% Injectable 25 Gram(s) IV Push once  dextrose 50% Injectable 12.5 Gram(s) IV Push once  dextrose 50% Injectable 25 Gram(s) IV Push once  glucagon  Injectable 1 milliGRAM(s) IntraMuscular once  heparin   Injectable 5000 Unit(s) SubCutaneous every 8 hours  insulin glargine Injectable (LANTUS) 15 Unit(s) SubCutaneous at bedtime  insulin lispro (ADMELOG) corrective regimen sliding scale   SubCutaneous every 6 hours  lactobacillus acidophilus 1 Tablet(s) Oral daily  LORazepam     Tablet 1 milliGRAM(s) Oral every 4 hours  melatonin 3 milliGRAM(s) Oral at bedtime PRN  methocarbamol 500 milliGRAM(s) Oral two times a day  pantoprazole   Suspension 40 milliGRAM(s) Oral daily  polyethylene glycol 3350 17 Gram(s) Oral every 12 hours PRN  povidone iodine 10% Solution 1 Application(s) Topical daily  senna 2 Tablet(s) Oral at bedtime  simethicone 80 milliGRAM(s) Chew every 6 hours              Vital Signs Last 24 Hrs  T(C): 36.7 (11 Jan 2022 12:00), Max: 36.8 (10 Lei 2022 20:00)  T(F): 98 (11 Jan 2022 12:00), Max: 98.2 (10 Lei 2022 20:00)  HR: 118 (11 Jan 2022 12:00) (76 - 118)  BP: 150/61 (11 Jan 2022 12:00) (100/52 - 163/60)  BP(mean): 85 (11 Jan 2022 12:00) (66 - 89)  RR: 27 (11 Jan 2022 12:00) (12 - 27)  SpO2: 98% (11 Jan 2022 12:00) (96% - 100%)      REVIEW OF SYSTEMS:  Could not be obtained secondary to the patient being nonverbal.    PHYSICAL EXAMINATION:    HEENT:  Head:  Normocephalic.  Eyes:  No scleral icterus.  Ears:  Hard to evaluate secondary to the patient being nonverbal.  NECK:  Had increased tone, tracheostomy was in place.  RESPIRATORY:  Decreased breath sounds bilaterally.  CARDIOVASCULAR:  S1 and S2 heard.  ABDOMEN:  Soft and nontender.  EXTREMITIES:  No clubbing or cyanosis was noted.      NEUROLOGIC:  The patient was resting in bed with eyes open.  Upon stimulation of the patient, her eyes would roll up,   Speech:  Nonverbal.  Tone in all four extremities increased.  Bilateral upper extremities were in a flexed position.  Bilateral lower extremities were in the straight position.                LABS:  CBC Full  -  ( 11 Jan 2022 06:35 )  WBC Count : 9.57 K/uL  RBC Count : 3.38 M/uL  Hemoglobin : 8.6 g/dL  Hematocrit : 28.7 %  Platelet Count - Automated : 306 K/uL  Mean Cell Volume : 84.9 fl  Mean Cell Hemoglobin : 25.4 pg  Mean Cell Hemoglobin Concentration : 30.0 gm/dL  Auto Neutrophil # : x  Auto Lymphocyte # : x  Auto Monocyte # : x  Auto Eosinophil # : x  Auto Basophil # : x  Auto Neutrophil % : x  Auto Lymphocyte % : x  Auto Monocyte % : x  Auto Eosinophil % : x  Auto Basophil % : x      01-11    141  |  103  |  90<H>  ----------------------------<  251<H>  4.2   |  32<H>  |  1.46<H>    Ca    8.4      11 Jan 2022 06:35  Phos  3.0     01-10  Mg     2.6     01-10    TPro  6.0  /  Alb  2.0<L>  /  TBili  0.4  /  DBili  x   /  AST  66<H>  /  ALT  90<H>  /  AlkPhos  155<H>  01-11    Hemoglobin A1C:     LIVER FUNCTIONS - ( 11 Jan 2022 06:35 )  Alb: 2.0 g/dL / Pro: 6.0 g/dL / ALK PHOS: 155 U/L / ALT: 90 U/L DA / AST: 66 U/L / GGT: x           Vitamin B12         RADIOLOGY    ANALYSIS AND PLAN:  This is a 76-year-old with history of abnormal movements and altered mental status.  For altered mental status, most likely metabolic encephalopathy secondary to underlying renal insufficiency.  For history of abnormal movements, the patient has been evaluated in the past for this, deemed nonepileptic as per my conversation with spouse, who refused EEG monitoring, did not want any antiseizure medications.  The patient was tried on muscle relaxants in the past, but that does not seem to help with her tone and may have a paradoxical effect of lowering blood pressure.  For now, I will recommend continuing her current medications.  For history of diabetes, strict control of blood sugars.  The patient's spouse's name is Marciano, telephone number is [phone].  Greater than 40 minutes of time was spent with the patient, plan of care, reviewing data, and speaking to multidisciplinary healthcare team.    Thank you for the courtesy of this consultation.     Neurology follow up note    BREA [id number]      Interval History:    Patient awake in bed     Allergies    codeine (Hives)    Intolerances        MEDICATIONS    acetaminophen     Tablet .. 650 milliGRAM(s) Oral every 6 hours PRN  ALBUTerol    90 MICROgram(s) HFA Inhaler 2 Puff(s) Inhalation every 6 hours  aluminum hydroxide/magnesium hydroxide/simethicone Suspension 30 milliLiter(s) Oral every 4 hours PRN  dextrose 40% Gel 15 Gram(s) Oral once  dextrose 5%. 1000 milliLiter(s) IV Continuous <Continuous>  dextrose 5%. 1000 milliLiter(s) IV Continuous <Continuous>  dextrose 50% Injectable 25 Gram(s) IV Push once  dextrose 50% Injectable 12.5 Gram(s) IV Push once  dextrose 50% Injectable 25 Gram(s) IV Push once  glucagon  Injectable 1 milliGRAM(s) IntraMuscular once  heparin   Injectable 5000 Unit(s) SubCutaneous every 8 hours  insulin glargine Injectable (LANTUS) 15 Unit(s) SubCutaneous at bedtime  insulin lispro (ADMELOG) corrective regimen sliding scale   SubCutaneous every 6 hours  lactobacillus acidophilus 1 Tablet(s) Oral daily  LORazepam     Tablet 1 milliGRAM(s) Oral every 4 hours  melatonin 3 milliGRAM(s) Oral at bedtime PRN  methocarbamol 500 milliGRAM(s) Oral two times a day  pantoprazole   Suspension 40 milliGRAM(s) Oral daily  polyethylene glycol 3350 17 Gram(s) Oral every 12 hours PRN  povidone iodine 10% Solution 1 Application(s) Topical daily  senna 2 Tablet(s) Oral at bedtime  simethicone 80 milliGRAM(s) Chew every 6 hours              Vital Signs Last 24 Hrs  T(C): 36.7 (11 Jan 2022 12:00), Max: 36.8 (10 Lei 2022 20:00)  T(F): 98 (11 Jan 2022 12:00), Max: 98.2 (10 Lei 2022 20:00)  HR: 118 (11 Jan 2022 12:00) (76 - 118)  BP: 150/61 (11 Jan 2022 12:00) (100/52 - 163/60)  BP(mean): 85 (11 Jan 2022 12:00) (66 - 89)  RR: 27 (11 Jan 2022 12:00) (12 - 27)  SpO2: 98% (11 Jan 2022 12:00) (96% - 100%)      REVIEW OF SYSTEMS:  Could not be obtained secondary to the patient being nonverbal.    PHYSICAL EXAMINATION:    HEENT:  Head:  Normocephalic.  Eyes:  No scleral icterus.  Ears:  Hard to evaluate secondary to the patient being nonverbal.  NECK:  Had increased tone, tracheostomy was in place.  RESPIRATORY:  Decreased breath sounds bilaterally.  CARDIOVASCULAR:  S1 and S2 heard.  ABDOMEN:  Soft and nontender.  EXTREMITIES:  No clubbing or cyanosis was noted.      NEUROLOGIC:  The patient was resting in bed with eyes open.  Upon stimulation of the patient, her eyes would roll up,   Speech:  Nonverbal.  Tone in all four extremities increased.  Bilateral upper extremities were in a flexed position.  Bilateral lower extremities were in the straight position.                LABS:  CBC Full  -  ( 11 Jan 2022 06:35 )  WBC Count : 9.57 K/uL  RBC Count : 3.38 M/uL  Hemoglobin : 8.6 g/dL  Hematocrit : 28.7 %  Platelet Count - Automated : 306 K/uL  Mean Cell Volume : 84.9 fl  Mean Cell Hemoglobin : 25.4 pg  Mean Cell Hemoglobin Concentration : 30.0 gm/dL  Auto Neutrophil # : x  Auto Lymphocyte # : x  Auto Monocyte # : x  Auto Eosinophil # : x  Auto Basophil # : x  Auto Neutrophil % : x  Auto Lymphocyte % : x  Auto Monocyte % : x  Auto Eosinophil % : x  Auto Basophil % : x      01-11    141  |  103  |  90<H>  ----------------------------<  251<H>  4.2   |  32<H>  |  1.46<H>    Ca    8.4      11 Jan 2022 06:35  Phos  3.0     01-10  Mg     2.6     01-10    TPro  6.0  /  Alb  2.0<L>  /  TBili  0.4  /  DBili  x   /  AST  66<H>  /  ALT  90<H>  /  AlkPhos  155<H>  01-11    Hemoglobin A1C:     LIVER FUNCTIONS - ( 11 Jan 2022 06:35 )  Alb: 2.0 g/dL / Pro: 6.0 g/dL / ALK PHOS: 155 U/L / ALT: 90 U/L DA / AST: 66 U/L / GGT: x           Vitamin B12         RADIOLOGY    ANALYSIS AND PLAN:  This is a 76-year-old with history of abnormal movements and altered mental status.  For altered mental status, most likely metabolic encephalopathy secondary to underlying renal insufficiency.  For history of abnormal movements, the patient has been evaluated in the past for this, deemed nonepileptic as per my conversation with spouse, who refused EEG monitoring, did not want any antiseizure medications.  The patient was tried on muscle relaxants in the past, but that does not seem to help with her tone and may have a paradoxical effect of lowering blood pressure.  For now, I will recommend continuing her current medications.  For history of diabetes, strict control of blood sugars.  The patient's spouse's name is Marciano, telephone number is [phone].  no new events   Greater than 34 minutes of time was spent with the patient, plan of care, reviewing data, and speaking to multidisciplinary healthcare team.    Thank you for the courtesy of this consultation.

## 2022-01-11 NOTE — DISCHARGE NOTE NURSING/CASE MANAGEMENT/SOCIAL WORK - NSDPFAC_GEN_ALL_CORE
[facility] via ambulance 1/11/22 [facility] via ambulance 1/11/22 pickup 7pm

## 2022-01-11 NOTE — DISCHARGE NOTE NURSING/CASE MANAGEMENT/SOCIAL WORK - NSDCVIVACCINE_GEN_ALL_CORE_FT
COVID-19, mRNA, LNP-S, PF, 30 mcg/0.3 mL dose (Pfizer); 20-Dec-2021 16:02; Cat Ramirez); [employer]; [id number] (Exp. Date: 30-Dec-2021); IntraMuscular; Deltoid Left.; 0.3 milliLiter(s);

## 2022-01-11 NOTE — PROGRESS NOTE ADULT - REASON FOR ADMISSION
abnormal labs

## 2022-01-11 NOTE — PROGRESS NOTE ADULT - SUBJECTIVE AND OBJECTIVE BOX
Date/Time Patient Seen:  		  Referring MD:   Data Reviewed	       Patient is a 76y old  Female who presents with a chief complaint of abnormal labs (10 Lei 2022 21:03)      Subjective/HPI     PAST MEDICAL & SURGICAL HISTORY:  Dementia of frontal lobe type    Aphasic stroke    Diabetes mellitus    Respiratory failure    Hypertension    GERD (gastroesophageal reflux disease)    Constipation    Respiratory failure    CVA (cerebral vascular accident)    HTN (hypertension)    DM (diabetes mellitus)    Advanced dementia    COVID-19 virus detected    Quadriplegia    Pneumonia    Type II diabetes mellitus    Hx of appendectomy    Gastrostomy in place    Tracheostomy in place    Tracheostomy tube present    Feeding by G-tube          Medication list         MEDICATIONS  (STANDING):  ALBUTerol    90 MICROgram(s) HFA Inhaler 2 Puff(s) Inhalation every 6 hours  dextrose 40% Gel 15 Gram(s) Oral once  dextrose 5%. 1000 milliLiter(s) (50 mL/Hr) IV Continuous <Continuous>  dextrose 5%. 1000 milliLiter(s) (100 mL/Hr) IV Continuous <Continuous>  dextrose 50% Injectable 25 Gram(s) IV Push once  dextrose 50% Injectable 12.5 Gram(s) IV Push once  dextrose 50% Injectable 25 Gram(s) IV Push once  glucagon  Injectable 1 milliGRAM(s) IntraMuscular once  heparin   Injectable 5000 Unit(s) SubCutaneous every 8 hours  insulin glargine Injectable (LANTUS) 5 Unit(s) SubCutaneous at bedtime  insulin lispro (ADMELOG) corrective regimen sliding scale   SubCutaneous every 6 hours  lactobacillus acidophilus 1 Tablet(s) Oral daily  LORazepam     Tablet 1 milliGRAM(s) Oral every 4 hours  methocarbamol 500 milliGRAM(s) Oral two times a day  pantoprazole   Suspension 40 milliGRAM(s) Oral daily  povidone iodine 10% Solution 1 Application(s) Topical daily  senna 2 Tablet(s) Oral at bedtime  simethicone 80 milliGRAM(s) Chew every 6 hours    MEDICATIONS  (PRN):  acetaminophen     Tablet .. 650 milliGRAM(s) Oral every 6 hours PRN Mild Pain (1 - 3)  aluminum hydroxide/magnesium hydroxide/simethicone Suspension 30 milliLiter(s) Oral every 4 hours PRN Dyspepsia  melatonin 3 milliGRAM(s) Oral at bedtime PRN Insomnia  polyethylene glycol 3350 17 Gram(s) Oral every 12 hours PRN Constipation         Vitals log        ICU Vital Signs Last 24 Hrs  T(C): 36.5 (11 Jan 2022 04:04), Max: 36.8 (10 Lei 2022 20:00)  T(F): 97.7 (11 Jan 2022 04:04), Max: 98.2 (10 Lei 2022 20:00)  HR: 86 (11 Jan 2022 06:00) (76 - 110)  BP: 115/46 (11 Jan 2022 06:00) (100/52 - 163/60)  BP(mean): 66 (11 Jan 2022 06:00) (66 - 89)  ABP: --  ABP(mean): --  RR: 13 (11 Jan 2022 06:00) (12 - 21)  SpO2: 100% (11 Jan 2022 06:00) (96% - 100%)       Mode: AC/ CMV (Assist Control/ Continuous Mandatory Ventilation)  RR (machine): 18  TV (machine): 400  FiO2: 40  PEEP: 5  ITime: 1  MAP: 12  PIP: 34      Input and Output:  I&O's Detail    10 Lei 2022 07:01  -  11 Jan 2022 07:00  --------------------------------------------------------  IN:    Enteral Tube Flush: 1250 mL    Glucerna 1.5: 1200 mL  Total IN: 2450 mL    OUT:    Indwelling Catheter - Urethral (mL): 500 mL  Total OUT: 500 mL    Total NET: 1950 mL          Lab Data                        8.6    9.57  )-----------( 306      ( 11 Jan 2022 06:35 )             28.7     01-11    141  |  103  |  90<H>  ----------------------------<  251<H>  4.2   |  32<H>  |  1.46<H>    Ca    8.4      11 Jan 2022 06:35  Phos  3.0     01-10  Mg     2.6     01-10    TPro  6.0  /  Alb  2.0<L>  /  TBili  0.4  /  DBili  x   /  AST  66<H>  /  ALT  90<H>  /  AlkPhos  155<H>  01-11            Review of Systems	      Objective     Physical Examination    heart s1s2  lung dec BS  abd soft      Pertinent Lab findings & Imaging      Annalise:  NO   Adequate UO     I&O's Detail    10 Lei 2022 07:01  -  11 Jan 2022 07:00  --------------------------------------------------------  IN:    Enteral Tube Flush: 1250 mL    Glucerna 1.5: 1200 mL  Total IN: 2450 mL    OUT:    Indwelling Catheter - Urethral (mL): 500 mL  Total OUT: 500 mL    Total NET: 1950 mL               Discussed with:     Cultures:	        Radiology

## 2022-01-11 NOTE — PROGRESS NOTE ADULT - SUBJECTIVE AND OBJECTIVE BOX
Chief Complaint: Abnormal labs    Interval Events: No events overnight.    Review of Systems:  General: No fevers, chills, weight gain  Skin: No rashes, color changes  Cardiovascular: No chest pain, orthopnea  Respiratory: No shortness of breath, cough  Gastrointestinal: No nausea, abdominal pain  Genitourinary: No incontinence, pain with urination  Musculoskeletal: No pain, swelling, decreased range of motion  Neurological: No headache, weakness  Psychiatric: No depression, anxiety  Endocrine: No weight gain, increased thirst  All other systems are comprehensively negative.    Physical Exam:  Vital Signs Last 24 Hrs  T(C): 36.4 (11 Jan 2022 08:00), Max: 36.8 (10 Lei 2022 20:00)  T(F): 97.6 (11 Jan 2022 08:00), Max: 98.2 (10 Lei 2022 20:00)  HR: 85 (11 Jan 2022 08:20) (76 - 110)  BP: 111/53 (11 Jan 2022 08:00) (100/52 - 163/60)  BP(mean): 69 (11 Jan 2022 08:00) (66 - 89)  RR: 16 (11 Jan 2022 08:00) (12 - 18)  SpO2: 98% (11 Jan 2022 08:20) (96% - 100%)  General: NAD  HEENT: MMM  Neck: No JVD, no carotid bruit  Lungs: CTAB  CV: RRR, nl S1/S2, no M/R/G  Abdomen: S/NT/ND, +BS  Extremities: No LE edema, no cyanosis  Neuro: AAOx3, non-focal  Skin: No rash    Labs:  01-11    141  |  103  |  90<H>  ----------------------------<  251<H>  4.2   |  32<H>  |  1.46<H>    Ca    8.4      11 Jan 2022 06:35  Phos  3.0     01-10  Mg     2.6     01-10    TPro  6.0  /  Alb  2.0<L>  /  TBili  0.4  /  DBili  x   /  AST  66<H>  /  ALT  90<H>  /  AlkPhos  155<H>  01-11                        8.6    9.57  )-----------( 306      ( 11 Jan 2022 06:35 )             28.7       Telemetry: Sinus rhythm

## 2022-01-11 NOTE — PROGRESS NOTE ADULT - ASSESSMENT
The patient is a 76 year old female with a history of HTN, DM, CVA, dementia, chronic respiratory failure s/p trach, PEG, cardiac arrest, anemia who presents with abnormal labs including hyponatremia, RYAN.    Plan:  - ECG with no evidence of ischemia or infarction  - Echo 9/21 with normal LV systolic function, mod pulm HTN  - Troponin mildly elevated at 109 and trended down, likely retention of enzymes from RYAN. No need to trend further.  - Hyponatremia resolved  - RYAN improved with IV fluids  - Renal follow-up  - Mechanical ventilation

## 2022-01-11 NOTE — DISCHARGE NOTE NURSING/CASE MANAGEMENT/SOCIAL WORK - NSDCPEFALRISK_GEN_ALL_CORE
For information on Fall & Injury Prevention, visit: [email or web address] OR  [email or web address] OR  https://www.cdc.gov/steadi/patient.html

## 2022-01-11 NOTE — DISCHARGE NOTE NURSING/CASE MANAGEMENT/SOCIAL WORK - PATIENT PORTAL LINK FT
You can access the FollowMyHealth Patient Portal offered by [facility] by registering at the following website: [email or web address]. By joining [facility]’s FollowMyHealth portal, you will also be able to view your health information using other applications (apps) compatible with our system.

## 2022-01-11 NOTE — PROGRESS NOTE ADULT - SUBJECTIVE AND OBJECTIVE BOX
[facility] DIVISION of INFECTIOUS DISEASE  Arturo Olivas MD PhD, Haylee Umanzor MD, Andressa Brantley MD, Billy Valenzuela MD, Emil Medellin MD  and providing coverage with Alexa Canas MD and Eusebio Chairez MD  Providing Infectious Disease Consultations at [facility], [facility]    Office# [phone] to schedule follow up appointments  Answering Service for urgent calls or New Consults [phone]  Cell# to text for urgent issues Arturo Olivas [phone]     infectious diseases progress note:    BREA BECKHAM is a 76y y. o. Female patient    No concerning overnight events    Allergies    codeine (Hives)    Intolerances        ANTIBIOTICS/RELEVANT:  antimicrobials    immunologic:    OTHER:  acetaminophen     Tablet .. 650 milliGRAM(s) Oral every 6 hours PRN  ALBUTerol    90 MICROgram(s) HFA Inhaler 2 Puff(s) Inhalation every 6 hours  aluminum hydroxide/magnesium hydroxide/simethicone Suspension 30 milliLiter(s) Oral every 4 hours PRN  dextrose 40% Gel 15 Gram(s) Oral once  dextrose 5%. 1000 milliLiter(s) IV Continuous <Continuous>  dextrose 5%. 1000 milliLiter(s) IV Continuous <Continuous>  dextrose 50% Injectable 25 Gram(s) IV Push once  dextrose 50% Injectable 12.5 Gram(s) IV Push once  dextrose 50% Injectable 25 Gram(s) IV Push once  glucagon  Injectable 1 milliGRAM(s) IntraMuscular once  heparin   Injectable 5000 Unit(s) SubCutaneous every 8 hours  insulin glargine Injectable (LANTUS) 5 Unit(s) SubCutaneous at bedtime  insulin lispro (ADMELOG) corrective regimen sliding scale   SubCutaneous every 6 hours  lactobacillus acidophilus 1 Tablet(s) Oral daily  LORazepam     Tablet 1 milliGRAM(s) Oral every 4 hours  melatonin 3 milliGRAM(s) Oral at bedtime PRN  methocarbamol 500 milliGRAM(s) Oral two times a day  pantoprazole   Suspension 40 milliGRAM(s) Oral daily  polyethylene glycol 3350 17 Gram(s) Oral every 12 hours PRN  povidone iodine 10% Solution 1 Application(s) Topical daily  senna 2 Tablet(s) Oral at bedtime  simethicone 80 milliGRAM(s) Chew every 6 hours      Objective:  Vital Signs Last 24 Hrs  T(C): 36.4 (11 Jan 2022 08:00), Max: 36.8 (10 Lei 2022 20:00)  T(F): 97.6 (11 Jan 2022 08:00), Max: 98.2 (10 Lei 2022 20:00)  HR: 84 (11 Jan 2022 08:00) (76 - 110)  BP: 111/53 (11 Jan 2022 08:00) (100/52 - 163/60)  BP(mean): 69 (11 Jan 2022 08:00) (66 - 89)  RR: 16 (11 Jan 2022 08:00) (12 - 18)  SpO2: 100% (11 Jan 2022 08:00) (96% - 100%)    T(C): 36.4 (01-11-22 @ 08:00), Max: 36.8 (01-09-22 @ 20:00)  T(C): 36.4 (01-11-22 @ 08:00), Max: 36.8 (01-09-22 @ 20:00)  T(C): 36.4 (01-11-22 @ 08:00), Max: 37 (01-07-22 @ 16:00)    PHYSICAL EXAM: nonverbal  HEENT: NC atraumatic  Neck: supple, intubated via trach  Respiratory: no accessory muscle use, breathing comfortably  Cardiovascular: distant  Gastrointestinal: normal appearing, nondistended  Extremities: no clubbing, no cyanosis,    Mode: AC/ CMV (Assist Control/ Continuous Mandatory Ventilation), RR (machine): 18, TV (machine): 400, FiO2: 40, PEEP: 5, ITime: 1, MAP: 12, PIP: 34    LABS:                          8.6    9.57  )-----------( 306      ( 11 Jan 2022 06:35 )             28.7       WBC  9.57 01-11 @ 06:35  8.60 01-10 @ 06:07  6.14 01-09 @ 07:53  5.59 01-08 @ 07:07  6.92 01-07 @ 06:28  7.16 01-07 @ 00:07      01-11    141  |  103  |  90<H>  ----------------------------<  251<H>  4.2   |  32<H>  |  1.46<H>    Ca    8.4      11 Jan 2022 06:35  Phos  3.0     01-10  Mg     2.6     01-10    TPro  6.0  /  Alb  2.0<L>  /  TBili  0.4  /  DBili  x   /  AST  66<H>  /  ALT  90<H>  /  AlkPhos  155<H>  01-11      Creatinine, Serum: 1.46 mg/dL (01-11-22 @ 06:35)  Creatinine, Serum: 1.51 mg/dL (01-10-22 @ 06:07)  Creatinine, Serum: 1.67 mg/dL (01-09-22 @ 07:53)  Creatinine, Serum: 1.94 mg/dL (01-09-22 @ 00:52)  Creatinine, Serum: 2.21 mg/dL (01-08-22 @ 17:03)  Creatinine, Serum: 2.66 mg/dL (01-08-22 @ 07:07)  Creatinine, Serum: 3.08 mg/dL (01-07-22 @ 22:30)  Creatinine, Serum: 3.41 mg/dL (01-07-22 @ 16:18)  Creatinine, Serum: 3.90 mg/dL (01-07-22 @ 06:28)  Creatinine, Serum: 4.23 mg/dL (01-07-22 @ 00:07)  Creatinine, Serum: 4.47 mg/dL (01-06-22 @ 21:21)                INFLAMMATORY MARKERS  Auto Neutrophil #: 5.85 K/uL (01-10-22 @ 06:07)  Auto Lymphocyte #: 1.64 K/uL (01-10-22 @ 06:07)  Auto Lymphocyte #: 1.00 K/uL (01-09-22 @ 07:53)  Auto Neutrophil #: 4.46 K/uL (01-09-22 @ 07:53)  Auto Neutrophil #: 5.53 K/uL (01-07-22 @ 06:28)  Auto Lymphocyte #: 0.81 K/uL (01-07-22 @ 06:28)    Lactate, Blood: 2.2 mmol/L (01-09-22 @ 07:51)  Lactate, Blood: 2.9 mmol/L (01-08-22 @ 07:07)  Lactate, Blood: 2.8 mmol/L (01-07-22 @ 01:45)  Lactate, Blood: 2.6 mmol/L (01-06-22 @ 21:21)    Auto Eosinophil #: 0.10 K/uL (01-10-22 @ 06:07)  Auto Eosinophil #: 0.11 K/uL (01-09-22 @ 07:53)  Auto Eosinophil #: 0.04 K/uL (01-07-22 @ 06:28)      Sedimentation Rate, Erythrocyte: 94 mm/Hr (01-07-22 @ 06:28)    Procalcitonin, Serum: 7.28 ng/mL (01-08-22 @ 11:34)  Procalcitonin, Serum: 22.90 ng/mL (01-07-22 @ 16:17)      Creatine Kinase, Serum: 4649 U/L (01-08-22 @ 07:07)  Creatine Kinase, Serum: 8440 U/L (01-07-22 @ 07:03)  Creatine Kinase, Serum: 8776 U/L (01-07-22 @ 00:07)              INR: 0.96 ratio (01-08-22 @ 07:07)          MICROBIOLOGY:              RADIOLOGY & ADDITIONAL STUDIES:

## 2022-01-11 NOTE — PROGRESS NOTE ADULT - SUBJECTIVE AND OBJECTIVE BOX
BREA BECKHAM    [facility]    Allergies    codeine (Hives)    Intolerances        PAST MEDICAL & SURGICAL HISTORY:  Dementia of frontal lobe type    Aphasic stroke    Diabetes mellitus    Respiratory failure    Hypertension    GERD (gastroesophageal reflux disease)    Constipation    Respiratory failure    CVA (cerebral vascular accident)    HTN (hypertension)    DM (diabetes mellitus)    Advanced dementia    COVID-19 virus detected    Quadriplegia    Pneumonia    Type II diabetes mellitus    Hx of appendectomy    Gastrostomy in place    Tracheostomy in place    Tracheostomy tube present    Feeding by G-tube        FAMILY HISTORY:  No pertinent family history in first degree relatives        Home Medications:  albuterol 90 mcg/inh inhalation aerosol: 2 puff(s) inhaled every 6 hours (08 Dec 2021 16:51)  Bacid (LAC) oral tablet: 2 tab(s) by gastrostomy tube once a day (08 Dec 2021 16:51)  Carafate 1 g/10 mL oral suspension: 10 milliliter(s) by gastrostomy tube 4 times a day (before meals and at bedtime) for 14 days (Started 6/4/21) (08 Dec 2021 16:51)  chlorhexidine 0.12% mucous membrane liquid: 15 milliliter(s) mucous membrane 2 times a day (08 Dec 2021 16:51)  insulin lispro 100 units/mL injectable solution: injectable 3 times a day ; sliding scale coverage  (14 Dec 2021 10:38)  ipratropium-albuterol 0.5 mg-2.5 mg/3 mL inhalation solution: 3 milliliter(s) inhaled 4 times a day (08 Dec 2021 16:51)  LORazepam 1 mg oral tablet: 1 tab(s) orally every 4 hours (20 Dec 2021 08:32)  methocarbamol 500 mg oral tablet: 1 tab(s) orally 2 times a day (14 Dec 2021 19:18)  pantoprazole 40 mg oral granule, delayed release: 40 milligram(s) by gastrostomy tube 2 times a day (08 Dec 2021 16:51)  polyethylene glycol 3350 oral powder for reconstitution: 17 gram(s) orally every 12 hours (08 Dec 2021 16:51)  povidone iodine 10% topical solution: 1 application topically once a day (09 Jan 2022 15:42)  ProAir HFA 90 mcg/inh inhalation aerosol: 2 puff(s) inhaled every 6 hours (08 Dec 2021 16:51)  senna 8.6 mg oral tablet: 3 tab(s) by gastrostomy tube once a day (at bedtime) (08 Dec 2021 16:51)  simethicone 80 mg oral tablet, chewable: 1 tab(s) orally every 6 hours (08 Dec 2021 16:51)  Tylenol 325 mg oral tablet: 2 tab(s) by gastrostomy tube once a day; 60 minutes prior to dressing change  (08 Dec 2021 16:51)      MEDICATIONS  (STANDING):  ALBUTerol    90 MICROgram(s) HFA Inhaler 2 Puff(s) Inhalation every 6 hours  dextrose 40% Gel 15 Gram(s) Oral once  dextrose 5%. 1000 milliLiter(s) (50 mL/Hr) IV Continuous <Continuous>  dextrose 5%. 1000 milliLiter(s) (100 mL/Hr) IV Continuous <Continuous>  dextrose 50% Injectable 25 Gram(s) IV Push once  dextrose 50% Injectable 12.5 Gram(s) IV Push once  dextrose 50% Injectable 25 Gram(s) IV Push once  glucagon  Injectable 1 milliGRAM(s) IntraMuscular once  heparin   Injectable 5000 Unit(s) SubCutaneous every 8 hours  insulin glargine Injectable (LANTUS) 5 Unit(s) SubCutaneous at bedtime  insulin lispro (ADMELOG) corrective regimen sliding scale   SubCutaneous every 6 hours  lactobacillus acidophilus 1 Tablet(s) Oral daily  LORazepam     Tablet 1 milliGRAM(s) Oral every 4 hours  methocarbamol 500 milliGRAM(s) Oral two times a day  pantoprazole   Suspension 40 milliGRAM(s) Oral daily  povidone iodine 10% Solution 1 Application(s) Topical daily  senna 2 Tablet(s) Oral at bedtime  simethicone 80 milliGRAM(s) Chew every 6 hours    MEDICATIONS  (PRN):  acetaminophen     Tablet .. 650 milliGRAM(s) Oral every 6 hours PRN Mild Pain (1 - 3)  aluminum hydroxide/magnesium hydroxide/simethicone Suspension 30 milliLiter(s) Oral every 4 hours PRN Dyspepsia  melatonin 3 milliGRAM(s) Oral at bedtime PRN Insomnia  polyethylene glycol 3350 17 Gram(s) Oral every 12 hours PRN Constipation      Diet, NPO:   Tube Feeding Modality: Gastrostomy  Glucerna 1.5 Horacio  Total Volume for 24 Hours (mL): 1200  Continuous  Starting Tube Feed Rate mL per Hour: 50  Until Goal Tube Feed Rate (mL per Hour): 50  Tube Feed Duration (in Hours): 24  Tube Feed Start Time: 16:00  Free Water Flush  Bolus   Total Volume per Flush (mL): 200   Frequency: Every 4 Hours (01-07-22 @ 17:17) [Active]          Vital Signs Last 24 Hrs  T(C): 36.5 (11 Jan 2022 04:04), Max: 36.8 (10 Lei 2022 20:00)  T(F): 97.7 (11 Jan 2022 04:04), Max: 98.2 (10 Lei 2022 20:00)  HR: 86 (11 Jan 2022 06:00) (76 - 110)  BP: 115/46 (11 Jan 2022 06:00) (100/52 - 163/60)  BP(mean): 66 (11 Jan 2022 06:00) (66 - 89)  RR: 13 (11 Jan 2022 06:00) (12 - 21)  SpO2: 100% (11 Jan 2022 06:00) (96% - 100%)      01-10-22 @ 07:01  -  01-11-22 @ 07:00  --------------------------------------------------------  IN: 2450 mL / OUT: 500 mL / NET: 1950 mL        Mode: AC/ CMV (Assist Control/ Continuous Mandatory Ventilation), RR (machine): 18, TV (machine): 400, FiO2: 40, PEEP: 5, ITime: 1, MAP: 12, PIP: 34      LABS:                        8.6    9.57  )-----------( 306      ( 11 Jan 2022 06:35 )             28.7     01-11    141  |  103  |  90<H>  ----------------------------<  251<H>  4.2   |  32<H>  |  1.46<H>    Ca    8.4      11 Jan 2022 06:35  Phos  3.0     01-10  Mg     2.6     01-10    TPro  6.0  /  Alb  2.0<L>  /  TBili  0.4  /  DBili  x   /  AST  66<H>  /  ALT  90<H>  /  AlkPhos  155<H>  01-11              WBC:  WBC Count: 9.57 K/uL (01-11 @ 06:35)  WBC Count: 8.60 K/uL (01-10 @ 06:07)  WBC Count: 6.14 K/uL (01-09 @ 07:53)  WBC Count: 5.59 K/uL (01-08 @ 07:07)      MICROBIOLOGY:  RECENT CULTURES:  01-07 Clean Catch Clean Catch (Midstream) XXXX XXXX   <10,000 CFU/mL Normal Urogenital Elvira    01-07 .Blood Blood-Peripheral XXXX XXXX   No growth to date.                    Sodium:  Sodium, Serum: 141 mmol/L (01-11 @ 06:35)  Sodium, Serum: 138 mmol/L (01-10 @ 06:07)  Sodium, Serum: 136 mmol/L (01-09 @ 07:53)  Sodium, Serum: 132 mmol/L (01-09 @ 00:52)  Sodium, Serum: 137 mmol/L (01-08 @ 17:03)  Sodium, Serum: 134 mmol/L (01-08 @ 07:07)  Sodium, Serum: 128 mmol/L (01-07 @ 22:30)  Sodium, Serum: 131 mmol/L (01-07 @ 16:18)      1.46 mg/dL 01-11 @ 06:35  1.51 mg/dL 01-10 @ 06:07  1.67 mg/dL 01-09 @ 07:53  1.94 mg/dL 01-09 @ 00:52  2.21 mg/dL 01-08 @ 17:03  2.66 mg/dL 01-08 @ 07:07  3.08 mg/dL 01-07 @ 22:30  3.41 mg/dL 01-07 @ 16:18      Hemoglobin:  Hemoglobin: 8.6 g/dL (01-11 @ 06:35)  Hemoglobin: 8.2 g/dL (01-10 @ 06:07)  Hemoglobin: 8.8 g/dL (01-09 @ 07:53)  Hemoglobin: 7.9 g/dL (01-08 @ 07:07)      Platelets: Platelet Count - Automated: 306 K/uL (01-11 @ 06:35)  Platelet Count - Automated: 184 K/uL (01-10 @ 06:07)  Platelet Count - Automated: 155 K/uL (01-09 @ 07:53)  Platelet Count - Automated: 158 K/uL (01-08 @ 07:07)      LIVER FUNCTIONS - ( 11 Jan 2022 06:35 )  Alb: 2.0 g/dL / Pro: 6.0 g/dL / ALK PHOS: 155 U/L / ALT: 90 U/L DA / AST: 66 U/L / GGT: x                 RADIOLOGY & ADDITIONAL STUDIES:      MICROBIOLOGY:  RECENT CULTURES:  01-07 Clean Catch Clean Catch (Midstream) XXXX XXXX   <10,000 CFU/mL Normal Urogenital Elvira    01-07 .Blood Blood-Peripheral XXXX XXXX   No growth to date.

## 2022-04-09 NOTE — PROGRESS NOTE ADULT - PROBLEM SELECTOR PROBLEM 2
Patient had a full history and physical examination completed by [facility] pediatrics department on March 22, 2022.  The noticed located in the care everywhere section of his chart.    I reviewed the H&P, I examined the patient, and there are no changes in the patient's condition.  
Leukocytosis
Anemia due to blood loss
Leukocytosis
Fever, unspecified fever cause
Leukocytosis
Leukocytosis
Anemia due to blood loss
Anemia due to blood loss
Leukocytosis
Fever, unspecified fever cause

## 2022-04-21 ENCOUNTER — INPATIENT (INPATIENT)
Facility: HOSPITAL | Age: 77
LOS: 7 days | Discharge: NURSING, REHAB OR LONG-TERM CARE | DRG: 853 | End: 2022-04-29
Attending: HOSPITALIST | Admitting: HOSPITALIST
Payer: MEDICARE

## 2022-04-21 VITALS
OXYGEN SATURATION: 100 % | HEIGHT: 65 IN | SYSTOLIC BLOOD PRESSURE: 97 MMHG | DIASTOLIC BLOOD PRESSURE: 54 MMHG | HEART RATE: 88 BPM | TEMPERATURE: 98 F | RESPIRATION RATE: 20 BRPM | WEIGHT: 110.01 LBS

## 2022-04-21 DIAGNOSIS — N39.0 URINARY TRACT INFECTION, SITE NOT SPECIFIED: ICD-10-CM

## 2022-04-21 DIAGNOSIS — D63.8 ANEMIA IN OTHER CHRONIC DISEASES CLASSIFIED ELSEWHERE: ICD-10-CM

## 2022-04-21 DIAGNOSIS — Z93.0 TRACHEOSTOMY STATUS: Chronic | ICD-10-CM

## 2022-04-21 DIAGNOSIS — Z93.1 GASTROSTOMY STATUS: Chronic | ICD-10-CM

## 2022-04-21 LAB
ALBUMIN SERPL ELPH-MCNC: 1.7 G/DL — LOW (ref 3.3–5)
ALP SERPL-CCNC: 111 U/L — SIGNIFICANT CHANGE UP (ref 30–120)
ALT FLD-CCNC: 19 U/L DA — SIGNIFICANT CHANGE UP (ref 10–60)
ANION GAP SERPL CALC-SCNC: 6 MMOL/L — SIGNIFICANT CHANGE UP (ref 5–17)
ANION GAP SERPL CALC-SCNC: 8 MMOL/L — SIGNIFICANT CHANGE UP (ref 5–17)
APPEARANCE UR: ABNORMAL
APTT BLD: 38.6 SEC — HIGH (ref 27.5–35.5)
AST SERPL-CCNC: 32 U/L — SIGNIFICANT CHANGE UP (ref 10–40)
BACTERIA # UR AUTO: ABNORMAL
BASOPHILS # BLD AUTO: 0.03 K/UL — SIGNIFICANT CHANGE UP (ref 0–0.2)
BASOPHILS NFR BLD AUTO: 0.2 % — SIGNIFICANT CHANGE UP (ref 0–2)
BILIRUB SERPL-MCNC: 0.5 MG/DL — SIGNIFICANT CHANGE UP (ref 0.2–1.2)
BILIRUB UR-MCNC: NEGATIVE — SIGNIFICANT CHANGE UP
BLD GP AB SCN SERPL QL: SIGNIFICANT CHANGE UP
BUN SERPL-MCNC: 45 MG/DL — HIGH (ref 7–23)
BUN SERPL-MCNC: 46 MG/DL — HIGH (ref 7–23)
CALCIUM SERPL-MCNC: 8.6 MG/DL — SIGNIFICANT CHANGE UP (ref 8.4–10.5)
CALCIUM SERPL-MCNC: 8.8 MG/DL — SIGNIFICANT CHANGE UP (ref 8.4–10.5)
CHLORIDE SERPL-SCNC: 100 MMOL/L — SIGNIFICANT CHANGE UP (ref 96–108)
CHLORIDE SERPL-SCNC: 98 MMOL/L — SIGNIFICANT CHANGE UP (ref 96–108)
CO2 SERPL-SCNC: 27 MMOL/L — SIGNIFICANT CHANGE UP (ref 22–31)
CO2 SERPL-SCNC: 28 MMOL/L — SIGNIFICANT CHANGE UP (ref 22–31)
COLOR SPEC: YELLOW — SIGNIFICANT CHANGE UP
CREAT SERPL-MCNC: 1.29 MG/DL — SIGNIFICANT CHANGE UP (ref 0.5–1.3)
CREAT SERPL-MCNC: 1.36 MG/DL — HIGH (ref 0.5–1.3)
DIFF PNL FLD: ABNORMAL
EGFR: 40 ML/MIN/1.73M2 — LOW
EGFR: 42 ML/MIN/1.73M2 — LOW
EOSINOPHIL # BLD AUTO: 0.07 K/UL — SIGNIFICANT CHANGE UP (ref 0–0.5)
EOSINOPHIL NFR BLD AUTO: 0.5 % — SIGNIFICANT CHANGE UP (ref 0–6)
EPI CELLS # UR: SIGNIFICANT CHANGE UP
FERRITIN SERPL-MCNC: 413 NG/ML — HIGH (ref 15–150)
GLUCOSE SERPL-MCNC: 149 MG/DL — HIGH (ref 70–99)
GLUCOSE SERPL-MCNC: 156 MG/DL — HIGH (ref 70–99)
GLUCOSE UR QL: NEGATIVE MG/DL — SIGNIFICANT CHANGE UP
HCT VFR BLD CALC: 20.3 % — CRITICAL LOW (ref 34.5–45)
HCT VFR BLD CALC: 27 % — LOW (ref 34.5–45)
HGB BLD-MCNC: 6 G/DL — CRITICAL LOW (ref 11.5–15.5)
HGB BLD-MCNC: 8.3 G/DL — LOW (ref 11.5–15.5)
IMM GRANULOCYTES NFR BLD AUTO: 0.7 % — SIGNIFICANT CHANGE UP (ref 0–1.5)
INR BLD: 1.4 RATIO — HIGH (ref 0.88–1.16)
KETONES UR-MCNC: ABNORMAL
LACTATE SERPL-SCNC: 2.1 MMOL/L — HIGH (ref 0.7–2)
LACTATE SERPL-SCNC: 2.1 MMOL/L — HIGH (ref 0.7–2)
LACTATE SERPL-SCNC: 2.8 MMOL/L — HIGH (ref 0.7–2)
LEUKOCYTE ESTERASE UR-ACNC: ABNORMAL
LYMPHOCYTES # BLD AUTO: 0.94 K/UL — LOW (ref 1–3.3)
LYMPHOCYTES # BLD AUTO: 6.6 % — LOW (ref 13–44)
MAGNESIUM SERPL-MCNC: 2.4 MG/DL — SIGNIFICANT CHANGE UP (ref 1.6–2.6)
MCHC RBC-ENTMCNC: 22.8 PG — LOW (ref 27–34)
MCHC RBC-ENTMCNC: 24.6 PG — LOW (ref 27–34)
MCHC RBC-ENTMCNC: 29.6 GM/DL — LOW (ref 32–36)
MCHC RBC-ENTMCNC: 30.7 GM/DL — LOW (ref 32–36)
MCV RBC AUTO: 77.2 FL — LOW (ref 80–100)
MCV RBC AUTO: 80.1 FL — SIGNIFICANT CHANGE UP (ref 80–100)
MONOCYTES # BLD AUTO: 1.51 K/UL — HIGH (ref 0–0.9)
MONOCYTES NFR BLD AUTO: 10.7 % — SIGNIFICANT CHANGE UP (ref 2–14)
NEUTROPHILS # BLD AUTO: 11.5 K/UL — HIGH (ref 1.8–7.4)
NEUTROPHILS NFR BLD AUTO: 81.3 % — HIGH (ref 43–77)
NITRITE UR-MCNC: NEGATIVE — SIGNIFICANT CHANGE UP
NRBC # BLD: 0 /100 WBCS — SIGNIFICANT CHANGE UP (ref 0–0)
NRBC # BLD: 0 /100 WBCS — SIGNIFICANT CHANGE UP (ref 0–0)
OB PNL STL: NEGATIVE — SIGNIFICANT CHANGE UP
PH UR: 5 — SIGNIFICANT CHANGE UP (ref 5–8)
PHOSPHATE SERPL-MCNC: 2.6 MG/DL — SIGNIFICANT CHANGE UP (ref 2.5–4.5)
PLATELET # BLD AUTO: 170 K/UL — SIGNIFICANT CHANGE UP (ref 150–400)
PLATELET # BLD AUTO: 176 K/UL — SIGNIFICANT CHANGE UP (ref 150–400)
POTASSIUM SERPL-MCNC: 3.8 MMOL/L — SIGNIFICANT CHANGE UP (ref 3.5–5.3)
POTASSIUM SERPL-MCNC: 3.8 MMOL/L — SIGNIFICANT CHANGE UP (ref 3.5–5.3)
POTASSIUM SERPL-SCNC: 3.8 MMOL/L — SIGNIFICANT CHANGE UP (ref 3.5–5.3)
POTASSIUM SERPL-SCNC: 3.8 MMOL/L — SIGNIFICANT CHANGE UP (ref 3.5–5.3)
PROT SERPL-MCNC: 6.5 G/DL — SIGNIFICANT CHANGE UP (ref 6–8.3)
PROT UR-MCNC: 500 MG/DL
PROTHROM AB SERPL-ACNC: 16.2 SEC — HIGH (ref 10.5–13.4)
RAPID RVP RESULT: SIGNIFICANT CHANGE UP
RBC # BLD: 2.63 M/UL — LOW (ref 3.8–5.2)
RBC # BLD: 3.37 M/UL — LOW (ref 3.8–5.2)
RBC # FLD: 17.9 % — HIGH (ref 10.3–14.5)
RBC # FLD: 18.8 % — HIGH (ref 10.3–14.5)
RBC CASTS # UR COMP ASSIST: ABNORMAL /HPF (ref 0–4)
SARS-COV-2 RNA SPEC QL NAA+PROBE: SIGNIFICANT CHANGE UP
SODIUM SERPL-SCNC: 133 MMOL/L — LOW (ref 135–145)
SODIUM SERPL-SCNC: 134 MMOL/L — LOW (ref 135–145)
SP GR SPEC: 1.02 — SIGNIFICANT CHANGE UP (ref 1.01–1.02)
UROBILINOGEN FLD QL: NEGATIVE MG/DL — SIGNIFICANT CHANGE UP
VIT B12 SERPL-MCNC: 1118 PG/ML — SIGNIFICANT CHANGE UP (ref 232–1245)
WBC # BLD: 14.15 K/UL — HIGH (ref 3.8–10.5)
WBC # BLD: 14.93 K/UL — HIGH (ref 3.8–10.5)
WBC # FLD AUTO: 14.15 K/UL — HIGH (ref 3.8–10.5)
WBC # FLD AUTO: 14.93 K/UL — HIGH (ref 3.8–10.5)
WBC UR QL: ABNORMAL

## 2022-04-21 PROCEDURE — 36600 WITHDRAWAL OF ARTERIAL BLOOD: CPT | Mod: 59

## 2022-04-21 PROCEDURE — 36556 INSERT NON-TUNNEL CV CATH: CPT

## 2022-04-21 PROCEDURE — 93010 ELECTROCARDIOGRAM REPORT: CPT

## 2022-04-21 PROCEDURE — 71250 CT THORAX DX C-: CPT | Mod: 26

## 2022-04-21 PROCEDURE — 71045 X-RAY EXAM CHEST 1 VIEW: CPT | Mod: 26,76

## 2022-04-21 PROCEDURE — 99285 EMERGENCY DEPT VISIT HI MDM: CPT | Mod: CS,25

## 2022-04-21 PROCEDURE — 74176 CT ABD & PELVIS W/O CONTRAST: CPT | Mod: 26

## 2022-04-21 PROCEDURE — 99223 1ST HOSP IP/OBS HIGH 75: CPT

## 2022-04-21 PROCEDURE — 70450 CT HEAD/BRAIN W/O DYE: CPT | Mod: 26

## 2022-04-21 RX ORDER — DEXTROSE 50 % IN WATER 50 %
15 SYRINGE (ML) INTRAVENOUS ONCE
Refills: 0 | Status: DISCONTINUED | OUTPATIENT
Start: 2022-04-21 | End: 2022-04-29

## 2022-04-21 RX ORDER — VANCOMYCIN HCL 1 G
750 VIAL (EA) INTRAVENOUS ONCE
Refills: 0 | Status: COMPLETED | OUTPATIENT
Start: 2022-04-21 | End: 2022-04-21

## 2022-04-21 RX ORDER — SODIUM CHLORIDE 9 MG/ML
1550 INJECTION INTRAMUSCULAR; INTRAVENOUS; SUBCUTANEOUS ONCE
Refills: 0 | Status: COMPLETED | OUTPATIENT
Start: 2022-04-21 | End: 2022-04-21

## 2022-04-21 RX ORDER — DEXTROSE 50 % IN WATER 50 %
25 SYRINGE (ML) INTRAVENOUS ONCE
Refills: 0 | Status: DISCONTINUED | OUTPATIENT
Start: 2022-04-21 | End: 2022-04-29

## 2022-04-21 RX ORDER — HEPARIN SODIUM 5000 [USP'U]/ML
5000 INJECTION INTRAVENOUS; SUBCUTANEOUS EVERY 12 HOURS
Refills: 0 | Status: DISCONTINUED | OUTPATIENT
Start: 2022-04-21 | End: 2022-04-29

## 2022-04-21 RX ORDER — POLYETHYLENE GLYCOL 3350 17 G/17G
17 POWDER, FOR SOLUTION ORAL
Qty: 0 | Refills: 0 | DISCHARGE

## 2022-04-21 RX ORDER — METHOCARBAMOL 500 MG/1
500 TABLET, FILM COATED ORAL
Refills: 0 | Status: DISCONTINUED | OUTPATIENT
Start: 2022-04-21 | End: 2022-04-29

## 2022-04-21 RX ORDER — SODIUM CHLORIDE 9 MG/ML
1000 INJECTION, SOLUTION INTRAVENOUS
Refills: 0 | Status: DISCONTINUED | OUTPATIENT
Start: 2022-04-21 | End: 2022-04-29

## 2022-04-21 RX ORDER — INSULIN LISPRO 100/ML
VIAL (ML) SUBCUTANEOUS
Refills: 0 | Status: DISCONTINUED | OUTPATIENT
Start: 2022-04-21 | End: 2022-04-21

## 2022-04-21 RX ORDER — SENNA PLUS 8.6 MG/1
2 TABLET ORAL AT BEDTIME
Refills: 0 | Status: DISCONTINUED | OUTPATIENT
Start: 2022-04-21 | End: 2022-04-29

## 2022-04-21 RX ORDER — ACETAMINOPHEN 500 MG
650 TABLET ORAL EVERY 6 HOURS
Refills: 0 | Status: DISCONTINUED | OUTPATIENT
Start: 2022-04-21 | End: 2022-04-29

## 2022-04-21 RX ORDER — LACTOBACILLUS ACIDOPHILUS 100MM CELL
1 CAPSULE ORAL
Refills: 0 | Status: DISCONTINUED | OUTPATIENT
Start: 2022-04-21 | End: 2022-04-29

## 2022-04-21 RX ORDER — ALBUTEROL 90 UG/1
2 AEROSOL, METERED ORAL
Qty: 0 | Refills: 0 | DISCHARGE

## 2022-04-21 RX ORDER — GLUCAGON INJECTION, SOLUTION 0.5 MG/.1ML
1 INJECTION, SOLUTION SUBCUTANEOUS ONCE
Refills: 0 | Status: DISCONTINUED | OUTPATIENT
Start: 2022-04-21 | End: 2022-04-29

## 2022-04-21 RX ORDER — CHLORHEXIDINE GLUCONATE 213 G/1000ML
15 SOLUTION TOPICAL EVERY 12 HOURS
Refills: 0 | Status: DISCONTINUED | OUTPATIENT
Start: 2022-04-21 | End: 2022-04-29

## 2022-04-21 RX ORDER — INSULIN LISPRO 100/ML
VIAL (ML) SUBCUTANEOUS EVERY 6 HOURS
Refills: 0 | Status: DISCONTINUED | OUTPATIENT
Start: 2022-04-21 | End: 2022-04-29

## 2022-04-21 RX ORDER — INSULIN LISPRO 100/ML
VIAL (ML) SUBCUTANEOUS AT BEDTIME
Refills: 0 | Status: DISCONTINUED | OUTPATIENT
Start: 2022-04-21 | End: 2022-04-21

## 2022-04-21 RX ORDER — TIOTROPIUM BROMIDE 18 UG/1
1 CAPSULE ORAL; RESPIRATORY (INHALATION) DAILY
Refills: 0 | Status: DISCONTINUED | OUTPATIENT
Start: 2022-04-21 | End: 2022-04-23

## 2022-04-21 RX ORDER — SODIUM CHLORIDE 9 MG/ML
250 INJECTION, SOLUTION INTRAVENOUS ONCE
Refills: 0 | Status: COMPLETED | OUTPATIENT
Start: 2022-04-21 | End: 2022-04-21

## 2022-04-21 RX ORDER — DEXTROSE 50 % IN WATER 50 %
12.5 SYRINGE (ML) INTRAVENOUS ONCE
Refills: 0 | Status: DISCONTINUED | OUTPATIENT
Start: 2022-04-21 | End: 2022-04-29

## 2022-04-21 RX ORDER — LANOLIN/MINERAL OIL
1 LOTION (ML) TOPICAL DAILY
Refills: 0 | Status: DISCONTINUED | OUTPATIENT
Start: 2022-04-21 | End: 2022-04-29

## 2022-04-21 RX ORDER — POVIDONE-IODINE 5 %
1 AEROSOL (ML) TOPICAL EVERY 12 HOURS
Refills: 0 | Status: DISCONTINUED | OUTPATIENT
Start: 2022-04-21 | End: 2022-04-29

## 2022-04-21 RX ORDER — ALBUTEROL 90 UG/1
2 AEROSOL, METERED ORAL EVERY 6 HOURS
Refills: 0 | Status: DISCONTINUED | OUTPATIENT
Start: 2022-04-21 | End: 2022-04-29

## 2022-04-21 RX ORDER — SODIUM CHLORIDE 9 MG/ML
1000 INJECTION INTRAMUSCULAR; INTRAVENOUS; SUBCUTANEOUS
Refills: 0 | Status: DISCONTINUED | OUTPATIENT
Start: 2022-04-21 | End: 2022-04-21

## 2022-04-21 RX ORDER — PIPERACILLIN AND TAZOBACTAM 4; .5 G/20ML; G/20ML
3.38 INJECTION, POWDER, LYOPHILIZED, FOR SOLUTION INTRAVENOUS ONCE
Refills: 0 | Status: COMPLETED | OUTPATIENT
Start: 2022-04-21 | End: 2022-04-21

## 2022-04-21 RX ORDER — PANTOPRAZOLE SODIUM 20 MG/1
40 TABLET, DELAYED RELEASE ORAL DAILY
Refills: 0 | Status: DISCONTINUED | OUTPATIENT
Start: 2022-04-21 | End: 2022-04-26

## 2022-04-21 RX ORDER — PIPERACILLIN AND TAZOBACTAM 4; .5 G/20ML; G/20ML
3.38 INJECTION, POWDER, LYOPHILIZED, FOR SOLUTION INTRAVENOUS EVERY 8 HOURS
Refills: 0 | Status: DISCONTINUED | OUTPATIENT
Start: 2022-04-21 | End: 2022-04-28

## 2022-04-21 RX ORDER — POLYETHYLENE GLYCOL 3350 17 G/17G
17 POWDER, FOR SOLUTION ORAL
Refills: 0 | Status: DISCONTINUED | OUTPATIENT
Start: 2022-04-21 | End: 2022-04-29

## 2022-04-21 RX ORDER — SIMETHICONE 80 MG/1
80 TABLET, CHEWABLE ORAL EVERY 6 HOURS
Refills: 0 | Status: DISCONTINUED | OUTPATIENT
Start: 2022-04-21 | End: 2022-04-29

## 2022-04-21 RX ORDER — SUCRALFATE 1 G
1 TABLET ORAL
Refills: 0 | Status: DISCONTINUED | OUTPATIENT
Start: 2022-04-21 | End: 2022-04-29

## 2022-04-21 RX ORDER — NOREPINEPHRINE BITARTRATE/D5W 8 MG/250ML
0.05 PLASTIC BAG, INJECTION (ML) INTRAVENOUS
Qty: 16 | Refills: 0 | Status: DISCONTINUED | OUTPATIENT
Start: 2022-04-21 | End: 2022-04-24

## 2022-04-21 RX ADMIN — SODIUM CHLORIDE 1550 MILLILITER(S): 9 INJECTION INTRAMUSCULAR; INTRAVENOUS; SUBCUTANEOUS at 12:41

## 2022-04-21 RX ADMIN — PIPERACILLIN AND TAZOBACTAM 200 GRAM(S): 4; .5 INJECTION, POWDER, LYOPHILIZED, FOR SOLUTION INTRAVENOUS at 13:13

## 2022-04-21 RX ADMIN — METHOCARBAMOL 500 MILLIGRAM(S): 500 TABLET, FILM COATED ORAL at 21:34

## 2022-04-21 RX ADMIN — PIPERACILLIN AND TAZOBACTAM 3.38 GRAM(S): 4; .5 INJECTION, POWDER, LYOPHILIZED, FOR SOLUTION INTRAVENOUS at 13:43

## 2022-04-21 RX ADMIN — PIPERACILLIN AND TAZOBACTAM 25 GRAM(S): 4; .5 INJECTION, POWDER, LYOPHILIZED, FOR SOLUTION INTRAVENOUS at 21:32

## 2022-04-21 RX ADMIN — Medication 1 APPLICATION(S): at 22:50

## 2022-04-21 RX ADMIN — Medication 1 TABLET(S): at 21:33

## 2022-04-21 RX ADMIN — SODIUM CHLORIDE 1500 MILLILITER(S): 9 INJECTION, SOLUTION INTRAVENOUS at 18:50

## 2022-04-21 RX ADMIN — Medication 250 MILLIGRAM(S): at 19:00

## 2022-04-21 RX ADMIN — Medication 2.34 MICROGRAM(S)/KG/MIN: at 19:00

## 2022-04-21 RX ADMIN — SIMETHICONE 80 MILLIGRAM(S): 80 TABLET, CHEWABLE ORAL at 21:33

## 2022-04-21 RX ADMIN — HEPARIN SODIUM 5000 UNIT(S): 5000 INJECTION INTRAVENOUS; SUBCUTANEOUS at 21:33

## 2022-04-21 RX ADMIN — Medication 1 GRAM(S): at 22:50

## 2022-04-21 RX ADMIN — SODIUM CHLORIDE 1550 MILLILITER(S): 9 INJECTION INTRAMUSCULAR; INTRAVENOUS; SUBCUTANEOUS at 13:41

## 2022-04-21 RX ADMIN — CHLORHEXIDINE GLUCONATE 15 MILLILITER(S): 213 SOLUTION TOPICAL at 21:34

## 2022-04-21 RX ADMIN — Medication 1 MILLIGRAM(S): at 21:34

## 2022-04-21 NOTE — PROCEDURE NOTE - NSINDICATIONS_GEN_A_CORE
arterial puncture to obtain ABG's/critical patient/monitoring purposes
critical illness/emergency venous access/venous access/volume resuscitation

## 2022-04-21 NOTE — H&P ADULT - NSHPSOCIALHISTORY_GEN_ALL_CORE
Social Hx:   Denies current alcohol, drug or recreational substance use Social Hx:   Unable to obtain

## 2022-04-21 NOTE — PROVIDER CONTACT NOTE (CRITICAL VALUE NOTIFICATION) - BACKGROUND
chronic vent, total care, sent from Nursing Home for fever today
Admitted for UTI
chronic vent patient from [facility] with fever today

## 2022-04-21 NOTE — PROVIDER CONTACT NOTE (CRITICAL VALUE NOTIFICATION) - ASSESSMENT
frail, lethargic, responds to pain by withdrawing. contractures of extremities, decubiti
responds to painful stimuli, frail, contracted

## 2022-04-21 NOTE — PATIENT PROFILE ADULT - FALL HARM RISK - HARM RISK INTERVENTIONS
Assistance with ambulation/Assistance OOB with selected safe patient handling equipment/Communicate Risk of Fall with Harm to all staff/Discuss with provider need for PT consult/Monitor gait and stability/Reinforce activity limits and safety measures with patient and family/Tailored Fall Risk Interventions/Visual Cue: Yellow wristband and red socks/Bed in lowest position, wheels locked, appropriate side rails in place/Call bell, personal items and telephone in reach/Instruct patient to call for assistance before getting out of bed or chair/Non-slip footwear when patient is out of bed/[place] to call system/Physically safe environment - no spills, clutter or unnecessary equipment/Purposeful Proactive Rounding/Room/bathroom lighting operational, light cord in reach

## 2022-04-21 NOTE — ED ADULT NURSE NOTE - OBJECTIVE STATEMENT
Patient brought to ED via EMS from SNF. Patient is a chronic vent resident from [facility] found to be febrile today at 102 per EMS. patient responds to pain by withdrawal.  Received Tylenol at SNF prior to transfer and IV inserted in left lower leg by EMS and fluids initiated en route to ED. Patient with PEG feeding tube in situ, all extremities flexed and stiff. Sacral and left buttock decubiti noted. Patient brought to ED via EMS from [facility]. Patient is a chronic vent resident from [facility] found to be febrile today at 102 per EMS. patient responds to pain by withdrawal.  Received Tylenol at SNF prior to transfer and IV inserted in left lower leg by EMS and fluids initiated en route to ED. Patient with PEG feeding tube in situ, all extremities flexed and stiff. Sacral and left buttock decubiti noted as well as suspected diabetic ulcers to bilateral great toes. Patient brought to ED via EMS from [facility]. [facility] febrile today at 102 per EMS. patient responds to pain by withdrawal.  Received Tylenol at SNF prior to transfer and IV inserted in left lower leg by EMS and fluids initiated en route to ED. Patient with PEG feeding tube in situ, all extremities flexed and stiff. Sacral and left buttock as well as right lateral foot at base of 5th toe decubiti noted as well as suspected diabetic ulcers to bilateral great toes.

## 2022-04-21 NOTE — CONSULT NOTE ADULT - SUBJECTIVE AND OBJECTIVE BOX
BREA BECKHAM    [facility]    Allergies    codeine (Hives)    Intolerances        PAST MEDICAL & SURGICAL HISTORY:  Dementia of frontal lobe type    Aphasic stroke    Diabetes mellitus    Respiratory failure    Hypertension    GERD (gastroesophageal reflux disease)    Constipation    Respiratory failure    CVA (cerebral vascular accident)    HTN (hypertension)    DM (diabetes mellitus)    Advanced dementia    COVID-19 virus detected    Quadriplegia    Pneumonia    Type II diabetes mellitus    Hx of appendectomy    Gastrostomy in place    Tracheostomy in place    Tracheostomy tube present    Feeding by G-tube        FAMILY HISTORY:      Home Medications:  albuterol 90 mcg/inh inhalation aerosol with adapter: 2  inhaled every 6 hours (2022 14:01)  Bacid (LAC) oral tablet: 2 tab(s) by gastrostomy tube once a day (2022 13:45)  Basaglar KwikPen 100 units/mL subcutaneous solution: 36 unit(s) subcutaneous once a day (at bedtime) (2022 14:01)  Betadine 10% topical swab: cleanse right and left great toes (2022 14:01)  Carafate 1 g/10 mL oral suspension: 10 milliliter(s) by gastrostomy tube 4 times a day (before meals and at bedtime) for 14 days (Started 21) (2022 13:45)  chlorhexidine 0.12% mucous membrane liquid: 15 milliliter(s) mucous membrane 2 times a day (2022 13:45)  Eucerin topical cream: Apply topically to affected area once a day bilateral feet (2022 14:01)  ipratropium-albuterol 0.5 mg-2.5 mg/3 mL inhalation solution: 3 milliliter(s) inhaled 4 times a day (2022 13:45)  LORazepam 1 mg oral tablet: 1 tab(s) by gastrostomy tube every 4 hours (2022 14:01)  methocarbamol 500 mg oral tablet: 1 tab(s) by gastrostomy tube 2 times a day (2022 14:01)  pantoprazole: 20 milliliter(s) by gastrostomy tube 2 times a day (2022 14:01)  polyethylene glycol 3350 oral powder for reconstitution: 17 gram(s) by gastrostomy tube every 12 hours (2022 14:01)  senna 8.6 mg oral tablet: 3 tab(s) by gastrostomy tube once a day (at bedtime) (2022 13:45)  silver sulfADIAZINE 1% topical cream: Apply topically to affected area once a day to sacrum (2022 14:01)  simethicone 80 mg oral tablet, chewable: 1 tab(s) by gastrostomy tube every 6 hours (2022 14:01)  Tylenol 325 mg oral tablet: 2 tab(s) by gastrostomy tube once a day; 60 minutes prior to dressing change  (2022 13:45)  Tylenol 325 mg oral tablet: 2 tab(s) by gastrostomy tube every 6 hours, As Needed (2022 14:01)      MEDICATIONS  (STANDING):  chlorhexidine 0.12% Liquid 15 milliLiter(s) Oral Mucosa every 12 hours    MEDICATIONS  (PRN):              Vital Signs Last 24 Hrs  T(C): 36.8 (2022 12:41), Max: 36.8 (2022 12:41)  T(F): 98.2 (2022 12:41), Max: 98.2 (2022 12:41)  HR: 85 (2022 14:00) (84 - 88)  BP: 103/58 (2022 14:00) (97/54 - 108/54)  BP(mean): --  RR: 18 (2022 14:00) (18 - 20)  SpO2: 100% (2022 14:00) (98% - 100%)        Mode: AC/ CMV (Assist Control/ Continuous Mandatory Ventilation), RR (machine): 18, FiO2: 100, PEEP: 5, ITime: 1      LABS:                        6.0    14.15 )-----------( 176      ( 2022 13:04 )             20.3     04-21    134<L>  |  98  |  46<H>  ----------------------------<  149<H>  3.8   |  28  |  1.36<H>    Ca    8.6      2022 13:04    TPro  6.5  /  Alb  1.7<L>  /  TBili  0.5  /  DBili  x   /  AST  32  /  ALT  19  /  AlkPhos  111  -    PT/INR - ( 2022 13:04 )   PT: 16.2 sec;   INR: 1.40 ratio         PTT - ( 2022 13:04 )  PTT:38.6 sec  Urinalysis Basic - ( 2022 13:04 )    Color: Yellow / Appearance: Turbid / S.020 / pH: x  Gluc: x / Ketone: Trace  / Bili: Negative / Urobili: Negative mg/dL   Blood: x / Protein: 500 mg/dL / Nitrite: Negative   Leuk Esterase: Moderate / RBC: 6-10 /HPF / WBC 11-25   Sq Epi: x / Non Sq Epi: Few / Bacteria: TNTC            WBC:  WBC Count: 14.15 K/uL ( @ 13:04)      MICROBIOLOGY:  RECENT CULTURES:              PT/INR - ( 2022 13:04 )   PT: 16.2 sec;   INR: 1.40 ratio         PTT - ( 2022 13:04 )  PTT:38.6 sec    Sodium:  Sodium, Serum: 134 mmol/L ( @ 13:04)      1.36 mg/dL  @ 13:04      Hemoglobin:  Hemoglobin: 6.0 g/dL ( @ 13:04)      Platelets: Platelet Count - Automated: 176 K/uL ( @ 13:04)      LIVER FUNCTIONS - ( 2022 13:04 )  Alb: 1.7 g/dL / Pro: 6.5 g/dL / ALK PHOS: 111 U/L / ALT: 19 U/L DA / AST: 32 U/L / GGT: x             Urinalysis Basic - ( 2022 13:04 )    Color: Yellow / Appearance: Turbid / S.020 / pH: x  Gluc: x / Ketone: Trace  / Bili: Negative / Urobili: Negative mg/dL   Blood: x / Protein: 500 mg/dL / Nitrite: Negative   Leuk Esterase: Moderate / RBC: 6-10 /HPF / WBC 11-25   Sq Epi: x / Non Sq Epi: Few / Bacteria: TNTC        RADIOLOGY & ADDITIONAL STUDIES:      MICROBIOLOGY:  RECENT CULTURES:

## 2022-04-21 NOTE — H&P ADULT - NSHPPHYSICALEXAM_GEN_ALL_CORE
PHYSICAL EXAM:  GENERAL: chronically ill appearing, emaciated, NAD, obtunded  HEAD:  atraumatic  EYES: conjunctiva clear  NECK: (+)trach in place, clean  RESPIRATORY:   coarse mechanical breath sounds, vent in place, no gross crackles or rales  CARDIOVASCULAR:  regular rate and rhythm, no murmurs or rubs or gallops, 2+ peripheral pulses  GASTROINTESTINAL:  soft, +PEG in place, clean and dry as well, nondistended, bowel sounds present  EXTREMITIES:     no clubbing or cyanosis or edema  MUSCULOSKELETAL:  (+)diffuse contractures in all joints  NERVOUS SYSTEM: does not respond to pain  SKIN:     necrotic tips of bilateral 1st toes PHYSICAL EXAM:  GENERAL: chronically ill appearing, emaciated, NAD, obtunded  HEAD:  atraumatic  EYES: conjunctiva clear  NECK: (+)trach in place, clean  RESPIRATORY:   coarse mechanical breath sounds, vent in place, no gross crackles or rales  CARDIOVASCULAR:  regular rate and rhythm, no murmurs or rubs or gallops, 2+ peripheral pulses  GASTROINTESTINAL:  soft, +PEG in place, clean and dry as well, nondistended, bowel sounds present  EXTREMITIES:     no clubbing or cyanosis or edema  MUSCULOSKELETAL:  (+)diffuse contractures in all joints  NERVOUS SYSTEM: does not respond to pain  SKIN:  necrotic tips of bilateral 1st toes PHYSICAL EXAM:  GENERAL: chronically ill appearing, emaciated, NAD, obtunded  HEAD:  atraumatic  EYES: conjunctiva clear  NECK: (+)trach in place, clean  RESPIRATORY:   coarse mechanical breath sounds, vent in place, no gross crackles or rales  CARDIOVASCULAR:  regular rate and rhythm, no murmurs or rubs or gallops, 2+ peripheral pulses  GASTROINTESTINAL:  soft, +PEG in place, clean and dry as well, nondistended, bowel sounds present  EXTREMITIES:     no clubbing or cyanosis or edema  MUSCULOSKELETAL:  (+)diffuse contractures in all joints  NERVOUS SYSTEM: does not respond to pain  SKIN:  necrotic tips of bilateral 1st toes  : Woody with purulent urine

## 2022-04-21 NOTE — ED PROVIDER NOTE - SKIN, MLM
Skin normal color for race, warm, dry and intact Skin normal color for race, warm, dry. + sacral and left gluteal ulcers

## 2022-04-21 NOTE — CONSULT NOTE ADULT - SUBJECTIVE AND OBJECTIVE BOX
[facility] DIVISION of INFECTIOUS DISEASE  Arturo Olivas MD PhD, Haylee Umanzor MD, Andressa Brantley MD, Billy Valenzuela MD, Emil Medellin MD  and providing coverage with Eusebio Chairez MD  Providing Infectious Disease Consultations at [facility], [facility], [facility], [facility]    Office# [phone] to schedule follow up appointments  Answering Service for urgent calls or New Consults [phone]  Cell# to text for urgent issues Arturo Olivas [phone]     HPI:  77 y/o F with PMH of HTN, DM type 2, CVA, recent Cardiac Arrest, Chronic Respiratory Failure, Vent Dependant s/p trach & PEG, Anemia with GI blood loss, and Dementia who was sent from [facility] facility for acute respiratory distress, fevers, hypotension. Patient unable to contribute to hx due to mental status.  In the ED: wbc 14.15, HGB 6.0, INR 1.40, sodium 134, cr 1.36, lactate 2.8. UA sig for mod LE, large blood, wbc, bacteria. Given Zosyn and ns.  (2022 14:02)      PAST MEDICAL & SURGICAL HISTORY:  Dementia of frontal lobe type    Aphasic stroke    Diabetes mellitus    Respiratory failure    Hypertension    GERD (gastroesophageal reflux disease)    Constipation    Respiratory failure    CVA (cerebral vascular accident)    HTN (hypertension)    DM (diabetes mellitus)    Advanced dementia    COVID-19 virus detected    Quadriplegia    Pneumonia    Type II diabetes mellitus    Hx of appendectomy    Gastrostomy in place    Tracheostomy in place    Tracheostomy tube present    Feeding by G-tube        Antimicrobials      Immunological      Other  acetaminophen     Tablet .. 650 milliGRAM(s) Oral every 6 hours PRN  ALBUTerol    90 MICROgram(s) HFA Inhaler 2 Puff(s) Inhalation every 6 hours PRN  chlorhexidine 0.12% Liquid 15 milliLiter(s) Oral Mucosa every 12 hours  dextrose 5%. 1000 milliLiter(s) IV Continuous <Continuous>  dextrose 5%. 1000 milliLiter(s) IV Continuous <Continuous>  dextrose 50% Injectable 25 Gram(s) IV Push once  dextrose 50% Injectable 12.5 Gram(s) IV Push once  dextrose 50% Injectable 25 Gram(s) IV Push once  dextrose Oral Gel 15 Gram(s) Oral once PRN  glucagon  Injectable 1 milliGRAM(s) IntraMuscular once  heparin   Injectable 5000 Unit(s) SubCutaneous every 12 hours  insulin lispro (ADMELOG) corrective regimen sliding scale   SubCutaneous three times a day before meals  insulin lispro (ADMELOG) corrective regimen sliding scale   SubCutaneous at bedtime  lactobacillus acidophilus 1 Tablet(s) Oral two times a day  LORazepam     Tablet 1 milliGRAM(s) Oral every 4 hours  methocarbamol 500 milliGRAM(s) Oral two times a day  mineral oil/petrolatum Hydrophilic Ointment 1 Application(s) Topical daily  pantoprazole   Suspension 40 milliGRAM(s) Oral daily  polyethylene glycol 3350 17 Gram(s) Oral two times a day  povidone iodine 10% Ointment 1 Application(s) Topical every 12 hours  senna 2 Tablet(s) Oral at bedtime  silver sulfADIAZINE 1% Cream 1 Application(s) Topical daily  simethicone 80 milliGRAM(s) Chew every 6 hours  sodium chloride 0.9%. 1000 milliLiter(s) IV Continuous <Continuous>  sucralfate 1 Gram(s) Oral four times a day  tiotropium 18 MICROgram(s) Capsule 1 Capsule(s) Inhalation daily      Allergies    codeine (Hives)    Intolerances        SOCIAL HISTORY:  Denies current alcohol, drug or recreational substance use (2022 14:02)      FAMILY HISTORY:  No pertinent family history in first degree relatives        ROS:    limited by cognitive status    Vital Signs Last 24 Hrs  T(C): 36.8 (2022 12:41), Max: 36.8 (2022 12:41)  T(F): 98.2 (2022 12:41), Max: 98.2 (2022 12:41)  HR: 86 (2022 14:51) (84 - 88)  BP: 98/51 (2022 14:51) (97/54 - 108/54)  BP(mean): --  RR: 20 (2022 14:51) (18 - 20)  SpO2: 99% (2022 14:51) (98% - 100%)    PE:  WDWN in no distress  HEENT:  NC, PERRL, sclerae anicteric, conjunctivae clear, EOMI.  Sinuses nontender, no nasal exudate.  No buccal or pharyngeal lesions, erythema or exudate  Neck:  Supple, no adenopathy, intubated via trach  Lungs:  No accessory muscle use, upper airway sounds  Cor:  distant  Abd:  Symmetric, normoactive BS.  Soft, nontender, no masses, guarding or rebound.  Liver and spleen not enlarged  Extrem:  No cyanosis or edema  Skin:  No rashes.  Neuro: limited    Mode: AC/ CMV (Assist Control/ Continuous Mandatory Ventilation), RR (machine): 18, FiO2: 100, PEEP: 5, ITime: 1    LABS:                        6.0    14.15 )-----------( 176      ( 2022 13:04 )             20.3       WBC Count: 14.15 K/uL (22 @ 13:04)          134<L>  |  98  |  46<H>  ----------------------------<  149<H>  3.8   |  28  |  1.36<H>    Ca    8.6      2022 13:04    TPro  6.5  /  Alb  1.7<L>  /  TBili  0.5  /  DBili  x   /  AST  32  /  ALT  19  /  AlkPhos  111        Creatinine, Serum: 1.36 mg/dL (22 @ 13:04)      Urinalysis Basic - ( 2022 13:04 )    Color: Yellow / Appearance: Turbid / S.020 / pH: x  Gluc: x / Ketone: Trace  / Bili: Negative / Urobili: Negative mg/dL   Blood: x / Protein: 500 mg/dL / Nitrite: Negative   Leuk Esterase: Moderate / RBC: 6-10 /HPF / WBC 11-25   Sq Epi: x / Non Sq Epi: Few / Bacteria: TNTC      MICROBIOLOGY:      RADIOLOGY & ADDITIONAL STUDIES:    --< from: CT Head No Cont (22 @ 14:50) >    ACC: [id number] EXAM:  CT BRAIN                          PROCEDURE DATE:  2022          INTERPRETATION:  CLINICAL INDICATION: Sepsis    TECHNIQUE: CT of the head was performed without the administration of   intravenous contrast.    COMPARISON:2020.    FINDINGS:  Severe diffuse parenchymal atrophy is again noted.    No acute intracranial hemorrhage. Ex vacuo dilatation of the lateral   ventricles.    There are periventricular white matter hypodensities that are nonspecific   in nature but may reflect chronic ischemic microvascular disease.  No extra-axial fluid collections.    The visualized intraorbital contents are unremarkable. Mild mucosal   thickening of the maxillary sinuses. The mastoid air cells are clear. The   visualized soft tissues and osseous structures appear normal.    IMPRESSION:    Severe diffuse parenchymal atrophy again noted. No acute intracranial   hemorrhage.    If the patient's symptoms persist, consider short interval follow-up head   CT or brain MRI if there are no MRI contraindications.

## 2022-04-21 NOTE — ED ADULT TRIAGE NOTE - CHIEF COMPLAINT QUOTE
Sent from [facility], fever 102 at NH, low blood pressure, pt trached to Vent, no acute respiratory distress on arrival

## 2022-04-21 NOTE — ED PROVIDER NOTE - CLINICAL SUMMARY MEDICAL DECISION MAKING FREE TEXT BOX
vented pt bib ems from [facility] for fever, hypotension, given tylenol pta - ekg/xr/labs/ivf/abx

## 2022-04-21 NOTE — CONSULT NOTE ADULT - SUBJECTIVE AND OBJECTIVE BOX
Patient is a 76y old  Female who presents with a chief complaint of Anemia (2022 18:42)      BRIEF HOSPITAL COURSE: 77 y/o F with PMH of HTN, DM type 2, CVA, recent Cardiac Arrest, Chronic Respiratory Failure, Vent Dependant s/p trach & PEG, Anemia with GI blood loss, and Dementia who was sent from [facility] facility for acute respiratory distress, fevers, hypotension. Patient unable to contribute to hx due to mental status.    Events last 24 hours: Called from [facility], Pt with Multifocal PNA, UTI in septic shock requiring emergent central line placement and vasopressor support with arterial monitoring.    PAST MEDICAL & SURGICAL HISTORY:  Dementia of frontal lobe type    Aphasic stroke    Diabetes mellitus    Respiratory failure    Hypertension    GERD (gastroesophageal reflux disease)    Constipation    Respiratory failure    CVA (cerebral vascular accident)    HTN (hypertension)    DM (diabetes mellitus)    Advanced dementia    COVID-19 virus detected    Quadriplegia    Pneumonia    Type II diabetes mellitus    Hx of appendectomy    Gastrostomy in place    Tracheostomy in place    Tracheostomy tube present    Feeding by G-tube      Allergies    codeine (Hives)    Intolerances      FAMILY HISTORY:  No pertinent family history in first degree relatives      SOCIAL HISTORY:     Review of Systems:  YULIYA pt obtunded    Medications:  piperacillin/tazobactam IVPB.. 3.375 Gram(s) IV Intermittent every 8 hours  vancomycin  IVPB 750 milliGRAM(s) IV Intermittent once    norepinephrine Infusion 0.05 MICROgram(s)/kG/Min IV Continuous <Continuous>    ALBUTerol    90 MICROgram(s) HFA Inhaler 2 Puff(s) Inhalation every 6 hours PRN  tiotropium 18 MICROgram(s) Capsule 1 Capsule(s) Inhalation daily    acetaminophen     Tablet .. 650 milliGRAM(s) Oral every 6 hours PRN  LORazepam     Tablet 1 milliGRAM(s) Oral every 4 hours  methocarbamol 500 milliGRAM(s) Oral two times a day      heparin   Injectable 5000 Unit(s) SubCutaneous every 12 hours    pantoprazole   Suspension 40 milliGRAM(s) Oral daily  polyethylene glycol 3350 17 Gram(s) Oral two times a day  senna 2 Tablet(s) Oral at bedtime  simethicone 80 milliGRAM(s) Chew every 6 hours  sucralfate 1 Gram(s) Oral four times a day      dextrose 50% Injectable 25 Gram(s) IV Push once  dextrose 50% Injectable 12.5 Gram(s) IV Push once  dextrose 50% Injectable 25 Gram(s) IV Push once  dextrose Oral Gel 15 Gram(s) Oral once PRN  glucagon  Injectable 1 milliGRAM(s) IntraMuscular once  insulin lispro (ADMELOG) corrective regimen sliding scale   SubCutaneous three times a day before meals  insulin lispro (ADMELOG) corrective regimen sliding scale   SubCutaneous at bedtime    dextrose 5%. 1000 milliLiter(s) IV Continuous <Continuous>  dextrose 5%. 1000 milliLiter(s) IV Continuous <Continuous>  lactated ringers Bolus 250 milliLiter(s) IV Bolus once  sodium chloride 0.9%. 1000 milliLiter(s) IV Continuous <Continuous>      chlorhexidine 0.12% Liquid 15 milliLiter(s) Oral Mucosa every 12 hours  mineral oil/petrolatum Hydrophilic Ointment 1 Application(s) Topical daily  povidone iodine 10% Ointment 1 Application(s) Topical every 12 hours  silver sulfADIAZINE 1% Cream 1 Application(s) Topical daily    lactobacillus acidophilus 1 Tablet(s) Oral two times a day      Mode: AC/ CMV (Assist Control/ Continuous Mandatory Ventilation)  RR (machine): 18  TV (machine): 400  FiO2: 60  PEEP: 5  ITime: 1  MAP: 11  PIP: 35      ICU Vital Signs Last 24 Hrs  T(C): 36.6 (2022 16:10), Max: 36.8 (2022 12:41)  T(F): 97.8 (2022 16:10), Max: 98.2 (2022 12:41)  HR: 84 (2022 18:30) (80 - 88)  BP: 81/41 (2022 18:00) (75/40 - 108/54)  BP(mean): 54 (2022 18:00) (51 - 54)  ABP: --  ABP(mean): --  RR: 18 (2022 18:30) (14 - 21)  SpO2: 92% (2022 18:30) (92% - 100%)    Vital Signs Last 24 Hrs  T(C): 36.6 (2022 16:10), Max: 36.8 (2022 12:41)  T(F): 97.8 (2022 16:10), Max: 98.2 (2022 12:41)  HR: 84 (2022 18:30) (80 - 88)  BP: 81/41 (2022 18:00) (75/40 - 108/54)  BP(mean): 54 (2022 18:00) (51 - 54)  RR: 18 (2022 18:30) (14 - 21)  SpO2: 92% (2022 18:30) (92% - 100%)        I&O's Detail    2022 07:01  -  2022 19:05  --------------------------------------------------------  IN:    IV PiggyBack: 100 mL    PRBCs (Packed Red Blood Cells): 374 mL    Sodium Chloride 0.9% Bolus: 1550 mL  Total IN: 4 mL    OUT:  Total OUT: 0 mL    Total NET:  mL          LABS:                        6.0    14.15 )-----------( 176      ( 2022 13:04 )             20.3     04-21    134<L>  |  98  |  46<H>  ----------------------------<  149<H>  3.8   |  28  |  1.36<H>    Ca    8.6      2022 13:04    TPro  6.5  /  Alb  1.7<L>  /  TBili  0.5  /  DBili  x   /  AST  32  /  ALT  19  /  AlkPhos  111  04-          CAPILLARY BLOOD GLUCOSE        PT/INR - ( 2022 13:04 )   PT: 16.2 sec;   INR: 1.40 ratio         PTT - ( 2022 13:04 )  PTT:38.6 sec  Urinalysis Basic - ( 2022 13:04 )    Color: Yellow / Appearance: Turbid / S.020 / pH: x  Gluc: x / Ketone: Trace  / Bili: Negative / Urobili: Negative mg/dL   Blood: x / Protein: 500 mg/dL / Nitrite: Negative   Leuk Esterase: Moderate / RBC: 6-10 /HPF / WBC 11-25   Sq Epi: x / Non Sq Epi: Few / Bacteria: TNTC        CULTURES:      Physical Examination:      General: Cachetic, unresponsive and contracted    HEENT: Pupils equal, reactive to light. Symmetric. No scleral icterus or injection.    PULM: Diminshed to auscultation B/L. No wheezes, rales, or rhonchi appreciated. No significant sputum production or increased respiratory effort.    NECK: Supple, no lymphadenopathy, trachea midline.    CVS: Regular rate and rhythm, no murmurs appreciated, +s1/s2.    ABD: Soft, nondistended, nontender, normoactive bowel sounds.    EXT: No edema, nontender.    SKIN: Warm and well perfused, no rashes noted.    NEURO: YULIYA dementia at baseline      RADIOLOGY: < from: CT Chest No Cont (22 @ 14:50) >    ACC: [id number] EXAM:  CT ABDOMEN AND PELVIS                        ACC: [id number] EXAM:  CT CHEST                          PROCEDURE DATE:  2022          INTERPRETATION:  CLINICAL INFORMATION: Sepsis. Breathing difficulty    COMPARISON: 2022    CONTRAST/COMPLICATIONS:  IV Contrast: NONE  Oral Contrast: NONE  Complications: None reported at time of study completion    PROCEDURE:  CT of the Chest, Abdomen and Pelvis was performed.  Sagittal and coronal reformats were performed. The patient was unable to   raise the arms above the head for the study    FINDINGS:  CHEST:  LUNGS AND LARGE AIRWAYS: Patent central airways. There our infiltrates in   the upper lobes and new from the prior exam. There are also infiltrates   or atelectasis atthe lung bases Tracheostomy tube is again seen  PLEURA: Bilateral pleural effusions left greater than right increased   from the prior study.  VESSELS: Atherosclerotic calcification of the aorta and of the coronary   arteries  HEART: Heart size is normal. No pericardial effusion.  MEDIASTINUM AND JHONNY: No lymphadenopathy. The proximal esophagus is   distended and air-filled  CHEST WALL AND LOWER NECK: Within normal limits.    ABDOMEN AND PELVIS:  LIVER: Within normal limits.  BILE DUCTS: Normal caliber.  GALLBLADDER: Cholelithiasis.  SPLEEN: Within normal limits.  PANCREAS: Within normal limits.  ADRENALS: Within normal limits.  KIDNEYS/URETERS: Within normal limits.    BLADDER: Woody catheter.  REPRODUCTIVE ORGANS: Uterus and adnexa within normal limits.    BOWEL: No bowel obstruction. A gastrostomy tube is again seen. The rectum   is distended and filled with fluid similar to the prior study  PERITONEUM: No ascites.  VESSELS: Within normal limits.  RETROPERITONEUM/LYMPH NODES: No lymphadenopathy.  ABDOMINAL WALL: Within normal limits.  BONES: Degenerative changes. Heterotopic ossification is seen of the site   of an old proximal femoral fracture. Mild levoscoliosis of the lumbar   spine    IMPRESSION:  The chest demonstrates bilateral pleural effusions increased in size from   the prior study and worsening bilateral infiltrates.  Tracheostomy is again seen  Distended upper esophagus is similar to the prior exam  No acute pathology in the abdomen or pelvis. Gastrostomy tube again  appreciated. Woody catheter.    --- End of Report ---    < end of copied text >

## 2022-04-21 NOTE — PROVIDER CONTACT NOTE (EICU) - RECOMMENDATIONS
-continue blood transfusion through foot for now, will notify eICU once blood completed to access need for bolus  -patient needs additional IV access and likely to need pressors  -spoke with [facility] ICU attending and PA. PA to go to SPCU to place line as per protocol for patients who require central venous access

## 2022-04-21 NOTE — ED ADULT NURSE NOTE - CHIEF COMPLAINT QUOTE
Sent from [facility], fever 102 at NH, low blood pressure, pt trached to Vent, no acute respiratory distress on arrival

## 2022-04-21 NOTE — PROCEDURE NOTE - NSANATOMICLLOCATION_GEN_A_CORE
55F female h/o gastric bypass surgery in 2012, h/o enterocutaneous fistula, s/p Ex-lap on 1/20 with resection of EC fistula, SBRx2, cholecystostomy tube seen 2/27 and 3/5 for abscess - drained by surgery svc and returned to rehab, seen earlier today for draining wound/abscess at [facility] and discharged to come to [facility] for eval and mgmt by surgery svc here (Dr Brady). Pt reports being drained by Dr Brady yest but noted increased drainage from incision site which prompted ed visit at [facility]. 55F female h/o diverticulitis, htn, dvt, obesity s/p gastric bypass surgery in 2012, h/o enterocutaneous fistula, s/p Ex-lap on 1/20 with resection of EC fistula, SBRx2, cholecystostomy tube seen 2/27 and 3/5 for abscess - drained by surgery svc and returned to rehab, seen earlier today for draining wound/abscess at [facility] and discharged to come to [facility] for eval and mgmt by surgery svc here (Dr Brady). Pt reports being drained by Dr Brady yest and having cholecystostomy drain pulled yest but noted increased drainage from incision site which prompted ed visit at [facility].  Pt unaware she had fever today.  Denies uri sx, cough, cp, abd pain, n/v/d, urinary sx.  Pt on clindamycin po. right/femoral artery

## 2022-04-21 NOTE — H&P ADULT - HISTORY OF PRESENT ILLNESS
75 y/o F with PMH of HTN, DM type 2, CVA, recent Cardiac Arrest, Chronic Respiratory Failure, Vent Dependant s/p trach & PEG, Anemia with GI blood loss, and Dementia who was sent from [facility] facility for acute respiratory distress, fevers, hypotension. Patient unable to contribute to hx due to mental status.    In the ED: wbc 14.15, HGB 6.0, INR 1.40, sodium 134, cr 1.36, lactate 2.8. UA sig for mod LE, large blood, wbc, bacteria. Given Zosyn and ns.  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 Patient unable to contribute to hx due to mental status.    In the ED: wbc 14.15, HGB 6.0, INR 1.40, sodium 134, cr 1.36, lactate 2.8. UA sig for mod LE, large blood, wbc, bacteria. Given Zosyn and ns.

## 2022-04-21 NOTE — ED ADULT NURSE NOTE - NSIMPLEMENTINTERV_GEN_ALL_ED
Implemented All Fall with Harm Risk Interventions:  [place] to call system. Call bell, personal items and telephone within reach. Instruct patient to call for assistance. Room bathroom lighting operational. Non-slip footwear when patient is off stretcher. Physically safe environment: no spills, clutter or unnecessary equipment. Stretcher in lowest position, wheels locked, appropriate side rails in place. Provide visual cue, wrist band, yellow gown, etc. Monitor gait and stability. Monitor for mental status changes and reorient to person, place, and time. Review medications for side effects contributing to fall risk. Reinforce activity limits and safety measures with patient and family. Provide visual clues: red socks.

## 2022-04-21 NOTE — CONSULT NOTE ADULT - SUBJECTIVE AND OBJECTIVE BOX
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 Patient unable to contribute to hx due to mental status.    In the ED: wbc 14.15, HGB 6.0, INR 1.40, sodium 134, cr 1.36, lactate 2.8. UA sig for mod LE, large blood, wbc, bacteria. Given Zosyn and ns.  (21 Apr 2022 14:02)       ROS:  Negative except for:    PAST MEDICAL & SURGICAL HISTORY:  Dementia of frontal lobe type    Aphasic stroke    Diabetes mellitus    Respiratory failure    Hypertension    GERD (gastroesophageal reflux disease)    Constipation    Respiratory failure    CVA (cerebral vascular accident)    HTN (hypertension)    DM (diabetes mellitus)    Advanced dementia    COVID-19 virus detected    Quadriplegia    Pneumonia    Type II diabetes mellitus    Hx of appendectomy    Gastrostomy in place    Tracheostomy in place    Tracheostomy tube present    Feeding by G-tube        SOCIAL HISTORY:    FAMILY HISTORY:  No pertinent family history in first degree relatives        MEDICATIONS  (STANDING):  chlorhexidine 0.12% Liquid 15 milliLiter(s) Oral Mucosa every 12 hours  dextrose 5%. 1000 milliLiter(s) (50 mL/Hr) IV Continuous <Continuous>  dextrose 5%. 1000 milliLiter(s) (100 mL/Hr) IV Continuous <Continuous>  dextrose 50% Injectable 25 Gram(s) IV Push once  dextrose 50% Injectable 12.5 Gram(s) IV Push once  dextrose 50% Injectable 25 Gram(s) IV Push once  glucagon  Injectable 1 milliGRAM(s) IntraMuscular once  heparin   Injectable 5000 Unit(s) SubCutaneous every 12 hours  insulin lispro (ADMELOG) corrective regimen sliding scale   SubCutaneous three times a day before meals  insulin lispro (ADMELOG) corrective regimen sliding scale   SubCutaneous at bedtime  lactobacillus acidophilus 1 Tablet(s) Oral two times a day  LORazepam     Tablet 1 milliGRAM(s) Oral every 4 hours  methocarbamol 500 milliGRAM(s) Oral two times a day  mineral oil/petrolatum Hydrophilic Ointment 1 Application(s) Topical daily  norepinephrine Infusion 0.05 MICROgram(s)/kG/Min (2.34 mL/Hr) IV Continuous <Continuous>  pantoprazole   Suspension 40 milliGRAM(s) Oral daily  piperacillin/tazobactam IVPB.. 3.375 Gram(s) IV Intermittent every 8 hours  polyethylene glycol 3350 17 Gram(s) Oral two times a day  povidone iodine 10% Ointment 1 Application(s) Topical every 12 hours  senna 2 Tablet(s) Oral at bedtime  silver sulfADIAZINE 1% Cream 1 Application(s) Topical daily  simethicone 80 milliGRAM(s) Chew every 6 hours  sucralfate 1 Gram(s) Oral four times a day  tiotropium 18 MICROgram(s) Capsule 1 Capsule(s) Inhalation daily    MEDICATIONS  (PRN):  acetaminophen     Tablet .. 650 milliGRAM(s) Oral every 6 hours PRN Temp greater or equal to 38C (100.4F)  ALBUTerol    90 MICROgram(s) HFA Inhaler 2 Puff(s) Inhalation every 6 hours PRN Shortness of Breath and/or Wheezing  dextrose Oral Gel 15 Gram(s) Oral once PRN Blood Glucose LESS THAN 70 milliGRAM(s)/deciliter      Allergies    codeine (Hives)    Intolerances        Vital Signs Last 24 Hrs  T(C): 36.6 (21 Apr 2022 16:10), Max: 36.8 (21 Apr 2022 12:41)  T(F): 97.8 (21 Apr 2022 16:10), Max: 98.2 (21 Apr 2022 12:41)  HR: 86 (21 Apr 2022 20:28) (80 - 88)  BP: 81/41 (21 Apr 2022 18:00) (75/40 - 108/54)  BP(mean): 54 (21 Apr 2022 18:00) (51 - 54)  RR: 18 (21 Apr 2022 18:30) (14 - 21)  SpO2: 99% (21 Apr 2022 20:28) (92% - 100%)    PHYSICAL EXAM  General: adullt non communicative with trach, on ventilator  HEENT: clear oropharynx, anicteric sclera, pink conjunctivae  Neck: supple  CV: normal S1S2 with no murmur rubs or gallops  Lungs: clear to auscultation, no wheezes, no rhales  Abdomen: soft non-tender non-distended, no hepato/splenomegaly  Ext: no clubbing cyanosis or edema  Skin: no rashes and no petichiae. sacral and gluteal decubiti noted by nurses  Neuro: non communicative      LABS:    CBC Full  -  ( 21 Apr 2022 13:04 )  WBC Count : 14.15 K/uL  RBC Count : 2.63 M/uL  Hemoglobin : 6.0 g/dL  Hematocrit : 20.3 %  Platelet Count - Automated : 176 K/uL  Mean Cell Volume : 77.2 fl  Mean Cell Hemoglobin : 22.8 pg  Mean Cell Hemoglobin Concentration : 29.6 gm/dL  Auto Neutrophil # : 11.50 K/uL  Auto Lymphocyte # : 0.94 K/uL  Auto Monocyte # : 1.51 K/uL  Auto Eosinophil # : 0.07 K/uL  Auto Basophil # : 0.03 K/uL  Auto Neutrophil % : 81.3 %  Auto Lymphocyte % : 6.6 %  Auto Monocyte % : 10.7 %  Auto Eosinophil % : 0.5 %  Auto Basophil % : 0.2 %    04-21    134<L>  |  98  |  46<H>  ----------------------------<  149<H>  3.8   |  28  |  1.36<H>    Ca    8.6      21 Apr 2022 13:04    TPro  6.5  /  Alb  1.7<L>  /  TBili  0.5  /  DBili  x   /  AST  32  /  ALT  19  /  AlkPhos  111  04-21    PT/INR - ( 21 Apr 2022 13:04 )   PT: 16.2 sec;   INR: 1.40 ratio         PTT - ( 21 Apr 2022 13:04 )  PTT:38.6 sec          BLOOD SMEAR INTERPRETATION:    RADIOLOGY & ADDITIONAL STUDIES:  < from: CT Abdomen and Pelvis No Cont (04.21.22 @ 14:50) >  ACC: [id number] EXAM:  CT ABDOMEN AND PELVIS                        ACC: [id number] EXAM:  CT CHEST                          PROCEDURE DATE:  04/21/2022          INTERPRETATION:  CLINICAL INFORMATION: Sepsis. Breathing difficulty    COMPARISON: 1/6/2022    CONTRAST/COMPLICATIONS:  IV Contrast: NONE  Oral Contrast: NONE  Complications: None reported at time of study completion    PROCEDURE:  CT of the Chest, Abdomen and Pelvis was performed.  Sagittal and coronal reformats were performed. The patient was unable to   raise the arms above the head for the study    FINDINGS:  CHEST:  LUNGS AND LARGE AIRWAYS: Patent central airways. There our infiltrates in   the upper lobes and new from the prior exam. There are also infiltrates   or atelectasis atthe lung bases Tracheostomy tube is again seen  PLEURA: Bilateral pleural effusions left greater than right increased   from the prior study.  VESSELS: Atherosclerotic calcification of the aorta and of the coronary   arteries  HEART: Heart size is normal. No pericardial effusion.  MEDIASTINUM AND JHONNY: No lymphadenopathy. The proximal esophagus is   distended and air-filled  CHEST WALL AND LOWER NECK: Within normal limits.    ABDOMEN AND PELVIS:  LIVER: Within normal limits.  BILE DUCTS: Normal caliber.  GALLBLADDER: Cholelithiasis.  SPLEEN: Within normal limits.  PANCREAS: Within normal limits.  ADRENALS: Within normal limits.  KIDNEYS/URETERS: Within normal limits.    BLADDER: Woody catheter.  REPRODUCTIVE ORGANS: Uterus and adnexa within normal limits.    BOWEL: No bowel obstruction. A gastrostomy tube is again seen. The rectum   is distended and filled with fluid similar to the prior study  PERITONEUM: No ascites.  VESSELS: Within normal limits.  RETROPERITONEUM/LYMPH NODES: No lymphadenopathy.  ABDOMINAL WALL: Within normal limits.  BONES: Degenerative changes. Heterotopic ossification is seen of the site   of an old proximal femoral fracture. Mild levoscoliosis of the lumbar   spine    IMPRESSION:  The chest demonstrates bilateral pleural effusions increased in size from   the prior study and worsening bilateral infiltrates.  Tracheostomy is again seen  Distended upper esophagus is similar to the prior exam  No acute pathology in the abdomen or pelvis. Gastrostomy tube again  appreciated. Woody catheter.    --- End of Report ---    < end of copied text >

## 2022-04-21 NOTE — H&P ADULT - ASSESSMENT
77 y/o F with PMH of HTN, DM type 2, CVA, recent Cardiac Arrest, Chronic Respiratory Failure, Vent Dependant s/p trach & PEG, Anemia with GI blood loss, and Dementia who was sent from [facility] facility for acute respiratory distress, fevers, hypotension.    #Sepsis  Sepsis poa  Repeat Lactate pending   CT chest, A/P pending  Currently on broad spectrum abx with Zosyn   IVF 30 cc/kg given in ED, continue gentle hydra  f/u blood and urine cultures  ID consulted, f/u reccs     #Anemia  Was evaluated by hematology 12/21  Workup suggestive of anemia of chronic disease  Supportive care  Ordered for 1 unit PRBC  Check occult    # RYAN   - likely pre-renal   - monitor BMP  - Avoid nephrotoxic meds     #Diabetes  Diabetes type II (not on home insulin)  Hold oral hypoglycemic meds  Insulin Corrective Scale  Finger sticks per routine  Consistent Carb Diet  Hemoglobin A1c in AM  Hypoglycemia protocol    #COPD:  - c/w inhalers   - pulm (Jaime), recs appreciated    #GERD:  - c/w carafate and PPI     # VTE ppx:  - SCD's for now, start heparin ASAP if occult neg as patient is at high thrombotic risk 77 y/o F with PMH of HTN, DM type 2, CVA, recent Cardiac Arrest, Chronic Respiratory Failure, Vent Dependant s/p trach & PEG, Anemia with GI blood loss, and Dementia who was sent from [facility] facility for acute respiratory distress, fevers, hypotension.    #Sepsis  Sepsis poa  Repeat Lactate pending   CT chest, A/P pending  Currently on broad spectrum abx with Zosyn   IVF 30 cc/kg given in ED, continue gentle hydra  f/u blood and urine cultures  ID consulted, f/u reccs     #Anemia  Was evaluated by hematology 12/21  Workup suggestive of anemia of chronic disease  Supportive care  Ordered for 1 unit PRBC  Check occult    # RYAN   - likely pre-renal   - monitor BMP  - Avoid nephrotoxic meds     #Diabetes  Diabetes type II (on home insulin)  Insulin Corrective Scale  Finger sticks per routine  Consistent Carb Diet  Hemoglobin A1c in AM  Hypoglycemia protocol    #COPD:  - c/w inhalers   - pulm (Jaime), recs appreciated    #GERD:  - c/w carafate and PPI     # VTE ppx:  - SCD's for now, start heparin ASAP if occult neg as patient is at high thrombotic risk 75 y/o F with PMH of HTN, DM type 2, CVA, recent Cardiac Arrest, Chronic Respiratory Failure, Vent Dependant s/p trach & PEG, Anemia with GI blood loss, and Dementia who was sent from [facility] facility for acute respiratory distress, fevers, hypotension.    #Sepsis  Sepsis poa, source likely uti  Repeat Lactate pending   CT chest, A/P pending  Currently on broad spectrum abx with Zosyn   IVF 30 cc/kg given in ED, continue gentle hydra  f/u blood and urine cultures  ID consulted, f/u reccs     #Anemia  Was evaluated by hematology 12/21  Workup suggestive of anemia of chronic disease  Supportive care  Ordered for 1 unit PRBC  Check occult    # RYAN   - likely pre-renal   - monitor BMP  - Avoid nephrotoxic meds     #Diabetes  Diabetes type II (on home insulin)  Insulin Corrective Scale  Finger sticks per routine  Consistent Carb Diet  Hemoglobin A1c in AM  Hypoglycemia protocol    #COPD:  - c/w inhalers   - pulm (Jaime), recs appreciated    #GERD:  - c/w carafate and PPI     # VTE ppx:  - SCD's for now, start heparin ASAP if occult neg as patient is at high thrombotic risk 77 y/o F with PMH of HTN, DM type 2, CVA, recent Cardiac Arrest, Chronic Respiratory Failure, Vent Dependant s/p trach & PEG, Anemia with GI blood loss, and Dementia who was sent from [facility] facility for acute respiratory distress, fevers, hypotension.    #Sepsis  Sepsis poa, source likely uti  Repeat Lactate pending   CT chest, A/P pending  Currently on broad spectrum abx with Zosyn   IVF 30 cc/kg given in ED, continue gentle hydration  f/u blood and urine cultures  ID consulted, f/u reccs     #Anemia  Was evaluated by hematology 12/21  Workup suggestive of anemia of chronic disease  Supportive care  Ordered for 1 unit PRBC  Check occult    # RYAN   - likely pre-renal   - monitor BMP  - Avoid nephrotoxic meds     #Diabetes  Diabetes type II (on home insulin)  Insulin Corrective Scale  Finger sticks per routine  Consistent Carb Diet  Hemoglobin A1c in AM  Hypoglycemia protocol    #COPD:  - c/w inhalers   - pulm (Jaime), recs appreciated    #GERD:  - c/w carafate and PPI     # VTE ppx:  - SCD's for now, start heparin ASAP if occult neg as patient is at high thrombotic risk

## 2022-04-21 NOTE — ED PROVIDER NOTE - OBJECTIVE STATEMENT
vented pt bib ems from [facility] for fever, hypotension. pt given tylenol at SNF, given IVF by ems. no further hx available.  pmgabo [id number]

## 2022-04-22 LAB
A1C WITH ESTIMATED AVERAGE GLUCOSE RESULT: 6.4 % — HIGH (ref 4–5.6)
ALBUMIN SERPL ELPH-MCNC: 1.7 G/DL — LOW (ref 3.3–5)
ALP SERPL-CCNC: 125 U/L — HIGH (ref 30–120)
ALT FLD-CCNC: 27 U/L DA — SIGNIFICANT CHANGE UP (ref 10–60)
ANION GAP SERPL CALC-SCNC: 7 MMOL/L — SIGNIFICANT CHANGE UP (ref 5–17)
AST SERPL-CCNC: 32 U/L — SIGNIFICANT CHANGE UP (ref 10–40)
BASOPHILS # BLD AUTO: 0.03 K/UL — SIGNIFICANT CHANGE UP (ref 0–0.2)
BASOPHILS NFR BLD AUTO: 0.2 % — SIGNIFICANT CHANGE UP (ref 0–2)
BILIRUB SERPL-MCNC: 1 MG/DL — SIGNIFICANT CHANGE UP (ref 0.2–1.2)
BUN SERPL-MCNC: 41 MG/DL — HIGH (ref 7–23)
C DIFF BY PCR RESULT: ABNORMAL
C DIFF TOX GENS STL QL NAA+PROBE: SIGNIFICANT CHANGE UP
CALCIUM SERPL-MCNC: 8.9 MG/DL — SIGNIFICANT CHANGE UP (ref 8.4–10.5)
CHLORIDE SERPL-SCNC: 99 MMOL/L — SIGNIFICANT CHANGE UP (ref 96–108)
CO2 SERPL-SCNC: 27 MMOL/L — SIGNIFICANT CHANGE UP (ref 22–31)
CREAT SERPL-MCNC: 1.1 MG/DL — SIGNIFICANT CHANGE UP (ref 0.5–1.3)
EGFR: 51 ML/MIN/1.73M2 — LOW
EOSINOPHIL # BLD AUTO: 0.15 K/UL — SIGNIFICANT CHANGE UP (ref 0–0.5)
EOSINOPHIL NFR BLD AUTO: 1 % — SIGNIFICANT CHANGE UP (ref 0–6)
ESTIMATED AVERAGE GLUCOSE: 137 MG/DL — HIGH (ref 68–114)
GLUCOSE BLDC GLUCOMTR-MCNC: 118 MG/DL — HIGH (ref 70–99)
GLUCOSE BLDC GLUCOMTR-MCNC: 133 MG/DL — HIGH (ref 70–99)
GLUCOSE BLDC GLUCOMTR-MCNC: 145 MG/DL — HIGH (ref 70–99)
GLUCOSE BLDC GLUCOMTR-MCNC: 177 MG/DL — HIGH (ref 70–99)
GLUCOSE BLDC GLUCOMTR-MCNC: 197 MG/DL — HIGH (ref 70–99)
GLUCOSE SERPL-MCNC: 125 MG/DL — HIGH (ref 70–99)
HCT VFR BLD CALC: 27.6 % — LOW (ref 34.5–45)
HGB BLD-MCNC: 8.4 G/DL — LOW (ref 11.5–15.5)
IMM GRANULOCYTES NFR BLD AUTO: 0.6 % — SIGNIFICANT CHANGE UP (ref 0–1.5)
LACTATE SERPL-SCNC: 1.2 MMOL/L — SIGNIFICANT CHANGE UP (ref 0.7–2)
LACTATE SERPL-SCNC: 1.7 MMOL/L — SIGNIFICANT CHANGE UP (ref 0.7–2)
LYMPHOCYTES # BLD AUTO: 0.98 K/UL — LOW (ref 1–3.3)
LYMPHOCYTES # BLD AUTO: 6.3 % — LOW (ref 13–44)
MAGNESIUM SERPL-MCNC: 2.4 MG/DL — SIGNIFICANT CHANGE UP (ref 1.6–2.6)
MCHC RBC-ENTMCNC: 24.1 PG — LOW (ref 27–34)
MCHC RBC-ENTMCNC: 30.4 GM/DL — LOW (ref 32–36)
MCV RBC AUTO: 79.1 FL — LOW (ref 80–100)
MONOCYTES # BLD AUTO: 1.04 K/UL — HIGH (ref 0–0.9)
MONOCYTES NFR BLD AUTO: 6.7 % — SIGNIFICANT CHANGE UP (ref 2–14)
NEUTROPHILS # BLD AUTO: 13.18 K/UL — HIGH (ref 1.8–7.4)
NEUTROPHILS NFR BLD AUTO: 85.2 % — HIGH (ref 43–77)
NRBC # BLD: 0 /100 WBCS — SIGNIFICANT CHANGE UP (ref 0–0)
PHOSPHATE SERPL-MCNC: 2.7 MG/DL — SIGNIFICANT CHANGE UP (ref 2.5–4.5)
PLATELET # BLD AUTO: 188 K/UL — SIGNIFICANT CHANGE UP (ref 150–400)
POTASSIUM SERPL-MCNC: 3.9 MMOL/L — SIGNIFICANT CHANGE UP (ref 3.5–5.3)
POTASSIUM SERPL-SCNC: 3.9 MMOL/L — SIGNIFICANT CHANGE UP (ref 3.5–5.3)
PROT SERPL-MCNC: 6.9 G/DL — SIGNIFICANT CHANGE UP (ref 6–8.3)
RBC # BLD: 3.49 M/UL — LOW (ref 3.8–5.2)
RBC # FLD: 18 % — HIGH (ref 10.3–14.5)
SODIUM SERPL-SCNC: 133 MMOL/L — LOW (ref 135–145)
WBC # BLD: 15.47 K/UL — HIGH (ref 3.8–10.5)
WBC # FLD AUTO: 15.47 K/UL — HIGH (ref 3.8–10.5)

## 2022-04-22 PROCEDURE — 99233 SBSQ HOSP IP/OBS HIGH 50: CPT

## 2022-04-22 RX ORDER — MIDAZOLAM HYDROCHLORIDE 1 MG/ML
1 INJECTION, SOLUTION INTRAMUSCULAR; INTRAVENOUS ONCE
Refills: 0 | Status: DISCONTINUED | OUTPATIENT
Start: 2022-04-22 | End: 2022-04-22

## 2022-04-22 RX ORDER — CHLORHEXIDINE GLUCONATE 213 G/1000ML
1 SOLUTION TOPICAL
Refills: 0 | Status: DISCONTINUED | OUTPATIENT
Start: 2022-04-22 | End: 2022-04-22

## 2022-04-22 RX ORDER — SODIUM CHLORIDE 9 MG/ML
10 INJECTION INTRAMUSCULAR; INTRAVENOUS; SUBCUTANEOUS
Refills: 0 | Status: DISCONTINUED | OUTPATIENT
Start: 2022-04-22 | End: 2022-04-29

## 2022-04-22 RX ORDER — CHLORHEXIDINE GLUCONATE 213 G/1000ML
1 SOLUTION TOPICAL DAILY
Refills: 0 | Status: DISCONTINUED | OUTPATIENT
Start: 2022-04-22 | End: 2022-04-29

## 2022-04-22 RX ORDER — FOLIC ACID 0.8 MG
1 TABLET ORAL DAILY
Refills: 0 | Status: DISCONTINUED | OUTPATIENT
Start: 2022-04-22 | End: 2022-04-29

## 2022-04-22 RX ADMIN — Medication 1 MILLIGRAM(S): at 23:08

## 2022-04-22 RX ADMIN — Medication 1 APPLICATION(S): at 19:18

## 2022-04-22 RX ADMIN — Medication 2: at 23:09

## 2022-04-22 RX ADMIN — SIMETHICONE 80 MILLIGRAM(S): 80 TABLET, CHEWABLE ORAL at 11:33

## 2022-04-22 RX ADMIN — CHLORHEXIDINE GLUCONATE 15 MILLILITER(S): 213 SOLUTION TOPICAL at 17:48

## 2022-04-22 RX ADMIN — METHOCARBAMOL 500 MILLIGRAM(S): 500 TABLET, FILM COATED ORAL at 17:49

## 2022-04-22 RX ADMIN — Medication 1 GRAM(S): at 23:00

## 2022-04-22 RX ADMIN — MIDAZOLAM HYDROCHLORIDE 1 MILLIGRAM(S): 1 INJECTION, SOLUTION INTRAMUSCULAR; INTRAVENOUS at 16:36

## 2022-04-22 RX ADMIN — Medication 1 APPLICATION(S): at 13:29

## 2022-04-22 RX ADMIN — Medication 1 GRAM(S): at 17:49

## 2022-04-22 RX ADMIN — Medication 1 MILLIGRAM(S): at 01:12

## 2022-04-22 RX ADMIN — CHLORHEXIDINE GLUCONATE 1 APPLICATION(S): 213 SOLUTION TOPICAL at 11:33

## 2022-04-22 RX ADMIN — Medication 1 MILLIGRAM(S): at 13:28

## 2022-04-22 RX ADMIN — Medication 1 MILLIGRAM(S): at 05:30

## 2022-04-22 RX ADMIN — HEPARIN SODIUM 5000 UNIT(S): 5000 INJECTION INTRAVENOUS; SUBCUTANEOUS at 17:49

## 2022-04-22 RX ADMIN — SIMETHICONE 80 MILLIGRAM(S): 80 TABLET, CHEWABLE ORAL at 17:49

## 2022-04-22 RX ADMIN — Medication 1 APPLICATION(S): at 05:28

## 2022-04-22 RX ADMIN — SIMETHICONE 80 MILLIGRAM(S): 80 TABLET, CHEWABLE ORAL at 23:00

## 2022-04-22 RX ADMIN — Medication 1 TABLET(S): at 17:49

## 2022-04-22 RX ADMIN — PIPERACILLIN AND TAZOBACTAM 25 GRAM(S): 4; .5 INJECTION, POWDER, LYOPHILIZED, FOR SOLUTION INTRAVENOUS at 13:28

## 2022-04-22 RX ADMIN — CHLORHEXIDINE GLUCONATE 15 MILLILITER(S): 213 SOLUTION TOPICAL at 05:28

## 2022-04-22 RX ADMIN — MIDAZOLAM HYDROCHLORIDE 1 MILLIGRAM(S): 1 INJECTION, SOLUTION INTRAMUSCULAR; INTRAVENOUS at 08:49

## 2022-04-22 RX ADMIN — Medication 1 MILLIGRAM(S): at 22:06

## 2022-04-22 RX ADMIN — PIPERACILLIN AND TAZOBACTAM 25 GRAM(S): 4; .5 INJECTION, POWDER, LYOPHILIZED, FOR SOLUTION INTRAVENOUS at 05:29

## 2022-04-22 RX ADMIN — PANTOPRAZOLE SODIUM 40 MILLIGRAM(S): 20 TABLET, DELAYED RELEASE ORAL at 13:28

## 2022-04-22 RX ADMIN — METHOCARBAMOL 500 MILLIGRAM(S): 500 TABLET, FILM COATED ORAL at 05:30

## 2022-04-22 RX ADMIN — HEPARIN SODIUM 5000 UNIT(S): 5000 INJECTION INTRAVENOUS; SUBCUTANEOUS at 05:30

## 2022-04-22 RX ADMIN — Medication 1 MILLIGRAM(S): at 11:33

## 2022-04-22 RX ADMIN — SIMETHICONE 80 MILLIGRAM(S): 80 TABLET, CHEWABLE ORAL at 05:29

## 2022-04-22 RX ADMIN — SIMETHICONE 80 MILLIGRAM(S): 80 TABLET, CHEWABLE ORAL at 01:12

## 2022-04-22 RX ADMIN — Medication 1 GRAM(S): at 11:36

## 2022-04-22 RX ADMIN — Medication 2: at 17:49

## 2022-04-22 RX ADMIN — Medication 1 TABLET(S): at 05:29

## 2022-04-22 RX ADMIN — Medication 1 APPLICATION(S): at 16:08

## 2022-04-22 RX ADMIN — Medication 1 GRAM(S): at 05:29

## 2022-04-22 RX ADMIN — PIPERACILLIN AND TAZOBACTAM 25 GRAM(S): 4; .5 INJECTION, POWDER, LYOPHILIZED, FOR SOLUTION INTRAVENOUS at 22:07

## 2022-04-22 RX ADMIN — Medication 1 MILLIGRAM(S): at 16:07

## 2022-04-22 NOTE — PROVIDER CONTACT NOTE (EICU) - BACKGROUND
76F PMH cardiac arrest, HTN, DM, CVA, dementia, chronic respiratory failure s/p trach, PEG admitted for sepsis/septic shock, on levophed
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 facility for acute respiratory distress, fevers, hypotension. Patient unable to contribute to hx due to mental status.    In the ED: wbc 14.15, HGB 6.0, INR 1.40, sodium 134, cr 1.36, lactate 2.8. UA sig for mod LE, large blood, wbc, bacteria. Given Zosyn and 1.5 L bolus (30 cc/kg in ED), also received IV fluid by EMS. SBP in ED 90s, upon arrival in SPCU, SBP in 70s. Patient had presented to ED with LUE midline in place, also had PIV placed in foot by ED due to difficult access. 1 uPRBC ordered, leaked when attached to midline, so only current access is PIV in foot.
s/p L IJ TLC by bedside ACP, requesting portable CXR order to confirm placement

## 2022-04-22 NOTE — DIETITIAN INITIAL EVALUATION ADULT - PERTINENT LABORATORY DATA
04-22    133<L>  |  99  |  41<H>  ----------------------------<  125<H>  3.9   |  27  |  1.10    Ca    8.9      22 Apr 2022 06:58  Phos  2.7     04-22  Mg     2.4     04-22    TPro  6.9  /  Alb  1.7<L>  /  TBili  1.0  /  DBili  x   /  AST  32  /  ALT  27  /  AlkPhos  125<H>  04-22  POCT Blood Glucose.: 133 mg/dL (04-22-22 @ 11:31)  A1C with Estimated Average Glucose Result: 6.2 % (12-09-21 @ 14:24)  A1C with Estimated Average Glucose Result: 6.6 % (09-29-21 @ 21:06)  A1C with Estimated Average Glucose Result: 6.9 % (09-08-21 @ 13:08)

## 2022-04-22 NOTE — PROGRESS NOTE ADULT - ASSESSMENT
77yo F with PMH of HTN, DM type 2, CVA, hx of Cardiac Arrest, Chronic Respiratory Failure, Vent Dependant s/p trach & PEG, Anemia with GI blood loss, and Dementia who was sent from [facility] facility for acute respiratory distress, fevers, hypotension a/w acute on chronic hypoxic respiratory failure, septic shock (evolving on admission) likely due to gram negative PNA and/or CAUTI, also with severe anemia.    #Septic Shock  evolving on admission  likely gram negative PNA and/or CAUTI  CT chest/abd/pelvis more consistent with PNA than pyelonephritis  Currently on broad spectrum abx with Zosyn which would likely cover both  last sputum Cx from 12/2021 had CRE Pseudomonas and Serratia which were sensitive to zosyn (the Stenotrophomonas may have been resistant)  f/u blood, urine, and sputum cultures  ID (Brendon/Vignesh group) consulted, recs appreciated  taper down levophed as able    #Anemia  Hematology (Contreras), recs appreciated  Work-up suggestive of anemia of chronic disease  pt started on folic acid per heme  Supportive care  Received 1 unit PRBC with good response (Hgb 6.0 ->8.4)  FOBT - negative    #RYAN   likely pre-renal, but may have some degree of ATN from septic shock  monitor BMP  Avoid nephrotoxic meds     #Diabetes  Diabetes type II (on home insulin)  Insulin Corrective Scale  Finger sticks per routine (have been in the low 100s);   Hypoglycemia protocol    #Quadriplegia:  c/w nursing care for all ADLs    #COPD:  c/w inhalers   pulm (Jaime), recs appreciated    #GERD:  c/w carafate and PPI     #VTE ppx:  c/w HSQ 77yo F with PMH of HTN, DM type 2, CVA, hx of Cardiac Arrest, Chronic Respiratory Failure, Vent Dependant s/p trach & PEG, Anemia with GI blood loss, and Dementia who was sent from [facility] facility for acute respiratory distress, fevers, hypotension a/w acute on chronic hypoxic respiratory failure, septic shock (evolving on admission) likely due to gram negative PNA and/or CAUTI, also with severe anemia.    #Septic Shock  evolving on admission  likely gram negative PNA and/or CAUTI  CT chest/abd/pelvis more consistent with PNA than pyelonephritis  Currently on broad spectrum abx with Zosyn which would likely cover both  last sputum Cx from 12/2021 had CRE Pseudomonas and Serratia which were sensitive to zosyn (the Stenotrophomonas may have been resistant)  f/u blood, urine, and sputum cultures  ID (Brendon/Vignesh group) consulted, recs appreciated  taper down levophed as able    #Acute on chronic hypoxic respiratory failure  likely due to pleural effusions and suspected gram-negative PNA  titrating down FiO2 on the vent as able  c/w zosyn for PNA  c/w inhalers per pulm (Jaime), recs appreciated    #Severe Anemia  Hematology (Chairez), recs appreciated  Work-up suggestive of anemia of chronic disease  pt started on folic acid per heme  Supportive care  Received 1 unit PRBC with good response (Hgb 6.0 ->8.4)  FOBT - negative    #RYAN   likely pre-renal, but may have some degree of ATN from septic shock  monitor BMP  Avoid nephrotoxic meds     #Diabetes  Diabetes type II (on home insulin)  Insulin Corrective Scale  Finger sticks per routine (have been in the low 100s);   Hypoglycemia protocol    #Quadriplegia:  c/w nursing care for all ADLs    #COPD:  c/w inhalers   pulm (Jaime), recs appreciated    #GERD:  c/w carafate and PPI     #VTE ppx:  c/w HSQ

## 2022-04-22 NOTE — PROGRESS NOTE ADULT - ASSESSMENT
IMPRESSION  76 year old female with a history of HTN, DM, CVA, dementia, chronic respiratory failure s/p trach, PEG, cardiac arrest, anemia who presented with respiratory distress, fevers, hypotension.    - Echo 9/21 with normal LV systolic function, mod pulm HTN  - CT chest with bilateral infiltrates and pleural effusions  s/p IVF and norepi. SInce weaned off norepi  s/p IVF resuscitation, mild RYAN since improved.  on Zosyn  s/p PRBC for Hgb 6g/dL    Anemia due to chronic disease  Microcytic anemia most likely related to acute/chronic disease with probable UTI, decubuti, long term intubation etc      RECOMMENDATION:  Cardiology following. If BP trends down, start midodrine  On mechanical ventilation, via trach.   On  zosyn for abx.   B12 and iron appear adequate: Ferritin 413, B12Vit >1118  Start folic acid 1mg PO daily.     Expect with current clinical situation that patient will be transfusion dependent  Cr 1.1, not a candidate for JENNIFER therapy    follow CBC and transfuse as indicated      continue medical management  prognosis is guarded  - Long term, Overall prognosis remains poor    Thank you for consulting us.   No additional recommendations at current time.   Will sign off on case for now.   Please call, or re-consult if needed.

## 2022-04-22 NOTE — PROGRESS NOTE ADULT - SUBJECTIVE AND OBJECTIVE BOX
BREA BECKHAM    [facility]    Allergies    codeine (Hives)    Intolerances        PAST MEDICAL & SURGICAL HISTORY:  Dementia of frontal lobe type    Aphasic stroke    Diabetes mellitus    Respiratory failure    Hypertension    GERD (gastroesophageal reflux disease)    Constipation    Respiratory failure    CVA (cerebral vascular accident)    HTN (hypertension)    DM (diabetes mellitus)    Advanced dementia    COVID-19 virus detected    Quadriplegia    Pneumonia    Type II diabetes mellitus    Hx of appendectomy    Gastrostomy in place    Tracheostomy in place    Tracheostomy tube present    Feeding by G-tube        FAMILY HISTORY:  No pertinent family history in first degree relatives        Home Medications:  albuterol 90 mcg/inh inhalation aerosol with adapter: 2  inhaled every 6 hours (2022 14:01)  Bacid (LAC) oral tablet: 2 tab(s) by gastrostomy tube once a day (2022 13:45)  Basaglar KwikPen 100 units/mL subcutaneous solution: 36 unit(s) subcutaneous once a day (at bedtime) (2022 14:01)  Betadine 10% topical swab: cleanse right and left great toes (2022 14:01)  Carafate 1 g/10 mL oral suspension: 10 milliliter(s) by gastrostomy tube 4 times a day (before meals and at bedtime) for 14 days (Started 21) (2022 13:45)  chlorhexidine 0.12% mucous membrane liquid: 15 milliliter(s) mucous membrane 2 times a day (2022 13:45)  Eucerin topical cream: Apply topically to affected area once a day bilateral feet (2022 14:01)  ipratropium-albuterol 0.5 mg-2.5 mg/3 mL inhalation solution: 3 milliliter(s) inhaled 4 times a day (2022 13:45)  LORazepam 1 mg oral tablet: 1 tab(s) by gastrostomy tube every 4 hours (2022 14:01)  methocarbamol 500 mg oral tablet: 1 tab(s) by gastrostomy tube 2 times a day (2022 14:01)  pantoprazole: 20 milliliter(s) by gastrostomy tube 2 times a day (2022 14:01)  polyethylene glycol 3350 oral powder for reconstitution: 17 gram(s) by gastrostomy tube every 12 hours (2022 14:01)  senna 8.6 mg oral tablet: 3 tab(s) by gastrostomy tube once a day (at bedtime) (2022 13:45)  silver sulfADIAZINE 1% topical cream: Apply topically to affected area once a day to sacrum (2022 14:01)  simethicone 80 mg oral tablet, chewable: 1 tab(s) by gastrostomy tube every 6 hours (2022 14:01)  Tylenol 325 mg oral tablet: 2 tab(s) by gastrostomy tube once a day; 60 minutes prior to dressing change  (2022 13:45)  Tylenol 325 mg oral tablet: 2 tab(s) by gastrostomy tube every 6 hours, As Needed (2022 14:01)      MEDICATIONS  (STANDING):  chlorhexidine 0.12% Liquid 15 milliLiter(s) Oral Mucosa every 12 hours  chlorhexidine 2% Cloths 1 Application(s) Topical daily  dextrose 5%. 1000 milliLiter(s) (100 mL/Hr) IV Continuous <Continuous>  dextrose 5%. 1000 milliLiter(s) (50 mL/Hr) IV Continuous <Continuous>  dextrose 50% Injectable 25 Gram(s) IV Push once  dextrose 50% Injectable 12.5 Gram(s) IV Push once  dextrose 50% Injectable 25 Gram(s) IV Push once  glucagon  Injectable 1 milliGRAM(s) IntraMuscular once  heparin   Injectable 5000 Unit(s) SubCutaneous every 12 hours  insulin lispro (ADMELOG) corrective regimen sliding scale   SubCutaneous every 6 hours  lactobacillus acidophilus 1 Tablet(s) Oral two times a day  LORazepam     Tablet 1 milliGRAM(s) Oral every 4 hours  methocarbamol 500 milliGRAM(s) Oral two times a day  mineral oil/petrolatum Hydrophilic Ointment 1 Application(s) Topical daily  norepinephrine Infusion 0.05 MICROgram(s)/kG/Min (2.34 mL/Hr) IV Continuous <Continuous>  pantoprazole   Suspension 40 milliGRAM(s) Oral daily  piperacillin/tazobactam IVPB.. 3.375 Gram(s) IV Intermittent every 8 hours  polyethylene glycol 3350 17 Gram(s) Oral two times a day  povidone iodine 10% Ointment 1 Application(s) Topical every 12 hours  senna 2 Tablet(s) Oral at bedtime  silver sulfADIAZINE 1% Cream 1 Application(s) Topical daily  simethicone 80 milliGRAM(s) Chew every 6 hours  sucralfate 1 Gram(s) Oral four times a day  tiotropium 18 MICROgram(s) Capsule 1 Capsule(s) Inhalation daily    MEDICATIONS  (PRN):  acetaminophen     Tablet .. 650 milliGRAM(s) Oral every 6 hours PRN Temp greater or equal to 38C (100.4F)  ALBUTerol    90 MICROgram(s) HFA Inhaler 2 Puff(s) Inhalation every 6 hours PRN Shortness of Breath and/or Wheezing  dextrose Oral Gel 15 Gram(s) Oral once PRN Blood Glucose LESS THAN 70 milliGRAM(s)/deciliter  sodium chloride 0.9% lock flush 10 milliLiter(s) IV Push every 1 hour PRN Pre/post blood products, medications, blood draw, and to maintain line patency              Vital Signs Last 24 Hrs  T(C): 36.6 (2022 04:30), Max: 37.1 (2022 00:29)  T(F): 97.8 (2022 04:30), Max: 98.8 (2022 00:29)  HR: 109 (2022 05:28) (80 - 109)  BP: 125/56 (2022 03:00) (75/40 - 125/56)  BP(mean): 75 (2022 03:00) (51 - 85)  RR: 29 (2022 03:08) (14 - 37)  SpO2: 99% (2022 05:28) (92% - 100%)      22 @ 07:01  -  22 @ 07:00  --------------------------------------------------------  IN: 2045.5 mL / OUT: 500 mL / NET: 1545.5 mL        Mode: AC/ CMV (Assist Control/ Continuous Mandatory Ventilation),PRVC, RR (machine): 18, TV (machine): 400, FiO2: 60, PEEP: 5, ITime: 1, MAP: 17, PIP: 26      LABS:                        8.4    15.47 )-----------( 188      ( 2022 06:58 )             27.6         133<L>  |  99  |  41<H>  ----------------------------<  125<H>  3.9   |  27  |  1.10    Ca    8.9      2022 06:58  Phos  2.7       Mg     2.4         TPro  6.9  /  Alb  1.7<L>  /  TBili  1.0  /  DBili  x   /  AST  32  /  ALT  27  /  AlkPhos  125<H>      PT/INR - ( 2022 13:04 )   PT: 16.2 sec;   INR: 1.40 ratio         PTT - ( 2022 13:04 )  PTT:38.6 sec  Urinalysis Basic - ( 2022 13:04 )    Color: Yellow / Appearance: Turbid / S.020 / pH: x  Gluc: x / Ketone: Trace  / Bili: Negative / Urobili: Negative mg/dL   Blood: x / Protein: 500 mg/dL / Nitrite: Negative   Leuk Esterase: Moderate / RBC: 6-10 /HPF / WBC 11-25   Sq Epi: x / Non Sq Epi: Few / Bacteria: TNTC            WBC:  WBC Count: 15.47 K/uL ( @ 06:58)  WBC Count: 14.93 K/uL ( @ 22:35)  WBC Count: 14.15 K/uL ( @ 13:04)      MICROBIOLOGY:  RECENT CULTURES:              PT/INR - ( 2022 13:04 )   PT: 16.2 sec;   INR: 1.40 ratio         PTT - ( 2022 13:04 )  PTT:38.6 sec    Sodium:  Sodium, Serum: 133 mmol/L ( @ 06:58)  Sodium, Serum: 133 mmol/L ( @ 22:35)  Sodium, Serum: 134 mmol/L ( @ 13:04)      1.10 mg/dL  @ 06:58  1.29 mg/dL  22:35  1.36 mg/dL  @ 13:04      Hemoglobin:  Hemoglobin: 8.4 g/dL ( @ 06:58)  Hemoglobin: 8.3 g/dL ( @ 22:35)  Hemoglobin: 6.0 g/dL ( @ 13:04)      Platelets: Platelet Count - Automated: 188 K/uL ( @ 06:58)  Platelet Count - Automated: 170 K/uL ( @ 22:35)  Platelet Count - Automated: 176 K/uL ( @ 13:04)      LIVER FUNCTIONS - ( 2022 06:58 )  Alb: 1.7 g/dL / Pro: 6.9 g/dL / ALK PHOS: 125 U/L / ALT: 27 U/L DA / AST: 32 U/L / GGT: x             Urinalysis Basic - ( 2022 13:04 )    Color: Yellow / Appearance: Turbid / S.020 / pH: x  Gluc: x / Ketone: Trace  / Bili: Negative / Urobili: Negative mg/dL   Blood: x / Protein: 500 mg/dL / Nitrite: Negative   Leuk Esterase: Moderate / RBC: 6-10 /HPF / WBC 11-25   Sq Epi: x / Non Sq Epi: Few / Bacteria: TNTC        RADIOLOGY & ADDITIONAL STUDIES:      MICROBIOLOGY:  RECENT CULTURES:

## 2022-04-22 NOTE — PROGRESS NOTE ADULT - ASSESSMENT
VIRIDIANAAMI BREA 75 f [facility] S 4/21/2022   DR ILIANA KEMP     REVIEW OF SYMPTOMS      Able to give (reliable) ROS  NO     PHYSICAL EXAM    HEENT Unremarkable  atraumatic   RESP Fair air entry EXP prolonged    Harsh breath sound Resp distres mild   CARDIAC S1 S2 No S3     NO JVD    ABDOMEN SOFT BS PRESENT NOT DISTENDED No hepatosplenomegaly   PEDAL EDEMA present No calf tenderness  NO rash       DOA/CC/PROBLEMS poa .  76 f   from [facility]  doa 4/21/2022  cc fever hypotension    PRESENTING PROBLEMS 4/21/2022   Sepsis   Lacticemia La 2.8   Anemia Hb 6   RYAN Cr 1.3     HOSP COURSE.  4/21 c line int jugul  4/21 ZOSYN   4/21 1 u prbc   4/22/2022 weaned off nore    PMH-PSH .  Pneumonia  HTN, DM, CVA, dementia, chronic respiratory failure s/p trach, PEG, cardiac arrest      COVID/ICU/CODE STATUS.                       COVID  STATUS.           ICU STAY. none  GOC.      BEST PRACTICE ISSUES.                                                  HEAD OF BED ELEVATION. Yes  DVT PROPHYLAXIS.    4/21 hpsc   INFECTION PROPHYLAXIS.   4/22 chlorhexidine 2%   4/21/2022 chlorhexidine .12%    SQUIRES PROPHYLAXIS.  4/21 protonix    4/21 carafate                                                                                      DIET.  4/22 glucerna 1.5 1200 gt      VITALS/PO/IO/VENT/DRIPS.     4/22/2022 afeb 100 100/50      PROBLEM DATA/ASSESSMENT RECOMMENDATIONS (A/R).    HEMODYNAMICS.   Monitor bp Target MAP 65 (+)    RESP.   Monitor po Target po 90-95%      PMH/PSH PROBLEMS.  Management continued/modified as indicated    VENT MANAGEMENT.   HOB elevation  Target Pplat 30 (-)  Target PO 90-95%  Target pH 730 (+)  Daily spontaneous breathing trials   Daily sedation vacation         INFECTION SOURCE.   INFECTION A/R.   Sepsis  VAP   bsab   Check sputum cult     INFECTION AUGUST.  W 4/21-4/22/2022 w 14 - 15  UA 4/21/2022 w 11-25   ct 4/22/2022 ct ch 4/21/2022   bl effsns increased in size cw 1/2022 Distended esophagus as before G tube Woody   CXR l gr than r perihilar dis   MICROBIO.  Rvp 4/21 (-)    ABIO.  4/21 ZOSYN       SHOCK.  4/21 norepi  4/22/2022 wened off nore     LACTICEMIA.  La 4/21-4/22/2022 la 2.8 - 1.2     COPD.  4/21 albuterol  4/21 spiriva     ANEMIA.   Hb 4/21-4/22/2022 hb 6 - 8.4   Monitro serial    TRANSFUSION  4/21 1 u prbc       HYPONATREMIA.  Na 4/21-4/22/2022 na 134- 133     RYAN.  Cr 4/21-4/22/2022 Cr 1.3 - 1.1      AMS.  ct 4/21 ct head (-)    TIME SPENT   Over 36 minutes aggregate critical care time spent on encounter; activities included   direct patient care, counseling and/or coordinating care reviewing notes, lab data/ imaging , discussion with multidisciplinary team/ patient  /family and explaining in detail risks, benefits, alternatives  of the recommendations     CHAPINCITO GUIDRY 75 f [facility] S 4/21/2022   DR ILIANA KEMP

## 2022-04-22 NOTE — PROGRESS NOTE ADULT - SUBJECTIVE AND OBJECTIVE BOX
Patient is a 76y old  Female who presents with a chief complaint of Anemia (2022 21:46)      BRIEF HOSPITAL COURSE: 75 y/o F with PMH of HTN, DM type 2, CVA, recent Cardiac Arrest, Chronic Respiratory Failure, Vent Dependant s/p trach & PEG, Anemia with GI blood loss, and Dementia who was sent from [facility] facility for acute respiratory distress, fevers, hypotension. Admitted w/ Septic Shock, acute on chronic respiratory failure, RYAN, lactic acidosis     Events last 24 hours: On levophed for BP support. Able to titrate FiO2 down to 60%. Lactate improving. Midline placed on ( 3/21) removed     PAST MEDICAL & SURGICAL HISTORY:  Dementia of frontal lobe type    Aphasic stroke    Diabetes mellitus    Respiratory failure    Hypertension    GERD (gastroesophageal reflux disease)    Constipation    Respiratory failure    CVA (cerebral vascular accident)    HTN (hypertension)    DM (diabetes mellitus)    Advanced dementia    COVID-19 virus detected    Quadriplegia    Pneumonia    Type II diabetes mellitus    Hx of appendectomy    Gastrostomy in place    Tracheostomy in place    Tracheostomy tube present    Feeding by G-tube        Review of Systems:  Unable to obtain due to clinical condition       Medications:  piperacillin/tazobactam IVPB.. 3.375 Gram(s) IV Intermittent every 8 hours  norepinephrine Infusion 0.05 MICROgram(s)/kG/Min IV Continuous <Continuous>  ALBUTerol    90 MICROgram(s) HFA Inhaler 2 Puff(s) Inhalation every 6 hours PRN  tiotropium 18 MICROgram(s) Capsule 1 Capsule(s) Inhalation daily  acetaminophen     Tablet .. 650 milliGRAM(s) Oral every 6 hours PRN  LORazepam     Tablet 1 milliGRAM(s) Oral every 4 hours  methocarbamol 500 milliGRAM(s) Oral two times a day  heparin   Injectable 5000 Unit(s) SubCutaneous every 12 hours  pantoprazole   Suspension 40 milliGRAM(s) Oral daily  polyethylene glycol 3350 17 Gram(s) Oral two times a day  senna 2 Tablet(s) Oral at bedtime  simethicone 80 milliGRAM(s) Chew every 6 hours  sucralfate 1 Gram(s) Oral four times a day  dextrose 50% Injectable 25 Gram(s) IV Push once  dextrose 50% Injectable 12.5 Gram(s) IV Push once  dextrose 50% Injectable 25 Gram(s) IV Push once  dextrose Oral Gel 15 Gram(s) Oral once PRN  glucagon  Injectable 1 milliGRAM(s) IntraMuscular once  insulin lispro (ADMELOG) corrective regimen sliding scale   SubCutaneous every 6 hours    dextrose 5%. 1000 milliLiter(s) IV Continuous <Continuous>  dextrose 5%. 1000 milliLiter(s) IV Continuous <Continuous>  sodium chloride 0.9% lock flush 10 milliLiter(s) IV Push every 1 hour PRN      chlorhexidine 0.12% Liquid 15 milliLiter(s) Oral Mucosa every 12 hours  chlorhexidine 4% Liquid 1 Application(s) Topical <User Schedule>  mineral oil/petrolatum Hydrophilic Ointment 1 Application(s) Topical daily  povidone iodine 10% Ointment 1 Application(s) Topical every 12 hours  silver sulfADIAZINE 1% Cream 1 Application(s) Topical daily    lactobacillus acidophilus 1 Tablet(s) Oral two times a day      Mode: AC/ CMV (Assist Control/ Continuous Mandatory Ventilation),PRVC  RR (machine): 18  TV (machine): 400  FiO2: 60  PEEP: 5  ITime: 1  MAP: 17  PIP: 26      ICU Vital Signs Last 24 Hrs  T(C): 37.1 (2022 00:29), Max: 37.1 (2022 00:29)  T(F): 98.8 (2022 00:29), Max: 98.8 (2022 00:29)  HR: 109 (2022 05:28) (80 - 109)  BP: 125/56 (2022 03:00) (75/40 - 125/56)  BP(mean): 75 (2022 03:00) (51 - 85)  ABP: 134/59 (2022 03:08) (73/66 - 139/62)  ABP(mean): 88 (2022 03:08) (55 - 181)  RR: 29 (2022 03:08) (14 - 37)  SpO2: 99% (2022 05:28) (92% - 100%)          I&O's Detail    2022 07:01  -  2022 06:30  --------------------------------------------------------  IN:    IV PiggyBack: 100 mL    Norepinephrine: 21.5 mL    PRBCs (Packed Red Blood Cells): 374 mL    Sodium Chloride 0.9% Bolus: 1550 mL  Total IN: 2045.5 mL    OUT:  Total OUT: 0 mL    Total NET: 2045.5 mL            LABS:                        8.3    14.93 )-----------( 170      ( 2022 22:35 )             27.0     04-21    133<L>  |  100  |  45<H>  ----------------------------<  156<H>  3.8   |  27  |  1.29    Ca    8.8      2022 22:35  Phos  2.6     -  Mg     2.4     -21    TPro  6.5  /  Alb  1.7<L>  /  TBili  0.5  /  DBili  x   /  AST  32  /  ALT  19  /  AlkPhos  111  -          CAPILLARY BLOOD GLUCOSE      POCT Blood Glucose.: 145 mg/dL (2022 00:05)    PT/INR - ( 2022 13:04 )   PT: 16.2 sec;   INR: 1.40 ratio         PTT - ( 2022 13:04 )  PTT:38.6 sec  Urinalysis Basic - ( 2022 13:04 )    Color: Yellow / Appearance: Turbid / S.020 / pH: x  Gluc: x / Ketone: Trace  / Bili: Negative / Urobili: Negative mg/dL   Blood: x / Protein: 500 mg/dL / Nitrite: Negative   Leuk Esterase: Moderate / RBC: 6-10 /HPF / WBC 11-25   Sq Epi: x / Non Sq Epi: Few / Bacteria: TNTC      CULTURES:  Rapid RVP Result: NotDetec (22 @ 13:04)      Physical Examination:    General: No acute distress. Contracted     HEENT: Pupils equal, reactive to light.  Symmetric.    PULM: Coarse breath sounds b/l. No wheeze     CVS: Regular rate and rhythm, no murmurs, rubs, or gallops    ABD: Soft, nondistended, nontender, normoactive bowel sounds. + PEG tube w/ purulent discharge around site     EXT: No edema, nontender    SKIN: Warm and well perfused, no rashes noted.    NEURO: Contracted. Not following commands.     RADIOLOGY: < from: CT Abdomen and Pelvis No Cont (22 @ 14:50) >  ACC: [id number] EXAM:  CT ABDOMEN AND PELVIS                        ACC: [id number] EXAM:  CT CHEST                          PROCEDURE DATE:  2022          INTERPRETATION:  CLINICAL INFORMATION: Sepsis. Breathing difficulty    COMPARISON: 2022    CONTRAST/COMPLICATIONS:  IV Contrast: NONE  Oral Contrast: NONE  Complications: None reported at time of study completion    PROCEDURE:  CT of the Chest, Abdomen and Pelvis was performed.  Sagittal and coronal reformats were performed. The patient was unable to   raise the arms above the head for the study    FINDINGS:  CHEST:  LUNGS AND LARGE AIRWAYS: Patent central airways. There our infiltrates in   the upper lobes and new from the prior exam. There are also infiltrates   or atelectasis atthe lung bases Tracheostomy tube is again seen  PLEURA: Bilateral pleural effusions left greater than right increased   from the prior study.  VESSELS: Atherosclerotic calcification of the aorta and of the coronary   arteries  HEART: Heart size is normal. No pericardial effusion.  MEDIASTINUM AND JHONNY: No lymphadenopathy. The proximal esophagus is   distended and air-filled  CHEST WALL AND LOWER NECK: Within normal limits.    ABDOMEN AND PELVIS:  LIVER: Within normal limits.  BILE DUCTS: Normal caliber.  GALLBLADDER: Cholelithiasis.  SPLEEN: Within normal limits.  PANCREAS: Within normal limits.  ADRENALS: Within normal limits.  KIDNEYS/URETERS: Within normal limits.    BLADDER: Woody catheter.  REPRODUCTIVE ORGANS: Uterus and adnexa within normal limits.    BOWEL: No bowel obstruction. A gastrostomy tube is again seen. The rectum   is distended and filled with fluid similar to the prior study  PERITONEUM: No ascites.  VESSELS: Within normal limits.  RETROPERITONEUM/LYMPH NODES: No lymphadenopathy.  ABDOMINAL WALL: Within normal limits.  BONES: Degenerative changes. Heterotopic ossification is seen of the site   of an old proximal femoral fracture. Mild levoscoliosis of the lumbar   spine    IMPRESSION:  The chest demonstrates bilateral pleural effusions increased in size from   the prior study and worsening bilateral infiltrates.  Tracheostomy is again seen  Distended upper esophagus is similar to the prior exam  No acute pathology in the abdomen or pelvis. Gastrostomy tube again  appreciated. Woody catheter.    --- End of Report ---    < end of copied text >      CRITICAL CARE TIME SPENT: 35 min   Evaluating/treating patient, reviewing data/labs/imaging, discussing case with multidisciplinary team, discussing plan/goals of care with patient/family. Non-inclusive of procedure time.

## 2022-04-22 NOTE — DIETITIAN INITIAL EVALUATION ADULT - OTHER INFO
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 Patient unable to contribute to hx due to mental status.    Nutrition consult ordered for stage II or > pressure ulcer.  Per chart, pt admitted with stage II PU to R buttock, unspecified sacral pressure ulcer and foot ulcers to BL big toes.  Pt with hx trach/PEG.  Per transfer documents, receiving Glucerna 1.5 to goal 60ml/hr x 20hrs (provides 1200ml TV formula, 1800kcal, 99g protein; also receiving 250ml free water q 6hrs, +25ml hourly flushes).  Pt currently with loose stool - rectal tube placed.  No active tube feeding ordered.  As medically feasible, recommend Glucerna 1.5, start at 20ml/hr increase by 10ml every 6 hours until goal 60ml/hr x 20hrs is reached (to provide 1800kcal based on 31kcal/kg IBW, 99g protein based on 1.7g/kg IBW, 1200ml TV formula, 912ml free water from formula); pt mildly hyponatremic on admission, recommend 30ml hourly free water flushes for total ~1632ml fluid from tube feed/flushes (based on 30ml/kg CBW [119#/54kg bed scale]).  RD to follow closely and will continue to monitor pt's nutrition status.

## 2022-04-22 NOTE — CONSULT NOTE ADULT - SUBJECTIVE AND OBJECTIVE BOX
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 Patient unable to contribute to hx due to mental status.    In the ED: wbc 14.15, HGB 6.0, INR 1.40, sodium 134, cr 1.36, lactate 2.8. UA sig for mod LE, large blood, wbc, bacteria. Given Zosyn and ns.      PAST MEDICAL & SURGICAL HISTORY:  Dementia of frontal lobe type    Aphasic stroke    Diabetes mellitus    Respiratory failure    Hypertension    GERD (gastroesophageal reflux disease)    Constipation    Respiratory failure    CVA (cerebral vascular accident)    HTN (hypertension)    DM (diabetes mellitus)    Advanced dementia    COVID-19 virus detected    Quadriplegia    Pneumonia    Type II diabetes mellitus    Hx of appendectomy    Gastrostomy in place    Tracheostomy in place    Tracheostomy tube present    Feeding by G-tube    FAMILY HISTORY:  No pertinent family history in first degree relatives. No pertinent family history of: cancer and congestive heart failure.     Social History:  Social History (marital status, living situation, occupation, tobacco use, alcohol and drug use, and sexual history): Social Hx:   Unable to obtain       Tobacco Screening:  · Core Measure Site	No    Risk Assessment:    Present on Admission:  Deep Venous Thrombosis	no  Pulmonary Embolus	no     Heart Failure:  Does this patient have a history of or has been diagnosed with heart failure? no.      Medication list         MEDICATIONS  (STANDING):  chlorhexidine 0.12% Liquid 15 milliLiter(s) Oral Mucosa every 12 hours  chlorhexidine 2% Cloths 1 Application(s) Topical daily  dextrose 5%. 1000 milliLiter(s) (100 mL/Hr) IV Continuous <Continuous>  dextrose 5%. 1000 milliLiter(s) (50 mL/Hr) IV Continuous <Continuous>  dextrose 50% Injectable 25 Gram(s) IV Push once  dextrose 50% Injectable 12.5 Gram(s) IV Push once  dextrose 50% Injectable 25 Gram(s) IV Push once  folic acid 1 milliGRAM(s) Oral daily  glucagon  Injectable 1 milliGRAM(s) IntraMuscular once  heparin   Injectable 5000 Unit(s) SubCutaneous every 12 hours  insulin lispro (ADMELOG) corrective regimen sliding scale   SubCutaneous every 6 hours  lactobacillus acidophilus 1 Tablet(s) Oral two times a day  LORazepam     Tablet 1 milliGRAM(s) Oral every 4 hours  methocarbamol 500 milliGRAM(s) Oral two times a day  mineral oil/petrolatum Hydrophilic Ointment 1 Application(s) Topical daily  norepinephrine Infusion 0.05 MICROgram(s)/kG/Min (2.34 mL/Hr) IV Continuous <Continuous>  pantoprazole   Suspension 40 milliGRAM(s) Oral daily  piperacillin/tazobactam IVPB.. 3.375 Gram(s) IV Intermittent every 8 hours  polyethylene glycol 3350 17 Gram(s) Oral two times a day  povidone iodine 10% Ointment 1 Application(s) Topical every 12 hours  senna 2 Tablet(s) Oral at bedtime  silver sulfADIAZINE 1% Cream 1 Application(s) Topical daily  simethicone 80 milliGRAM(s) Chew every 6 hours  sucralfate 1 Gram(s) Oral four times a day  tiotropium 18 MICROgram(s) Capsule 1 Capsule(s) Inhalation daily    MEDICATIONS  (PRN):  acetaminophen     Tablet .. 650 milliGRAM(s) Oral every 6 hours PRN Temp greater or equal to 38C (100.4F)  ALBUTerol    90 MICROgram(s) HFA Inhaler 2 Puff(s) Inhalation every 6 hours PRN Shortness of Breath and/or Wheezing  dextrose Oral Gel 15 Gram(s) Oral once PRN Blood Glucose LESS THAN 70 milliGRAM(s)/deciliter  sodium chloride 0.9% lock flush 10 milliLiter(s) IV Push every 1 hour PRN Pre/post blood products, medications, blood draw, and to maintain line patency         Vitals log        ICU Vital Signs Last 24 Hrs  T(C): 37.1 (22 Apr 2022 12:45), Max: 37.1 (22 Apr 2022 00:29)  T(F): 98.8 (22 Apr 2022 12:45), Max: 98.8 (22 Apr 2022 00:29)  HR: 100 (22 Apr 2022 12:00) (80 - 109)  BP: 101/52 (22 Apr 2022 12:00) (75/40 - 125/56)  BP(mean): 67 (22 Apr 2022 12:00) (51 - 85)  ABP: 101/43 (22 Apr 2022 12:00) (73/66 - 139/62)  ABP(mean): 64 (22 Apr 2022 12:00) (55 - 181)  RR: 26 (22 Apr 2022 12:00) (13 - 37)  SpO2: 98% (22 Apr 2022 12:00) (92% - 100%)       Mode: AC/ CMV (Assist Control/ Continuous Mandatory Ventilation)  RR (machine): 18  TV (machine): 0.4  FiO2: 60  PEEP: 5  ITime: 1  MAP: 11  PIP: 25      Input and Output:  I&O's Detail    21 Apr 2022 07:01  -  22 Apr 2022 07:00  --------------------------------------------------------  IN:    IV PiggyBack: 100 mL    Norepinephrine: 21.5 mL    PRBCs (Packed Red Blood Cells): 374 mL    Sodium Chloride 0.9% Bolus: 1550 mL  Total IN: 2045.5 mL    OUT:    Indwelling Catheter - Urethral (mL): 500 mL  Total OUT: 500 mL    Total NET: 1545.5 mL          Lab Data                        8.4    15.47 )-----------( 188      ( 22 Apr 2022 06:58 )             27.6     04-22    133<L>  |  99  |  41<H>  ----------------------------<  125<H>  3.9   |  27  |  1.10    Ca    8.9      22 Apr 2022 06:58  Phos  2.7     04-22  Mg     2.4     04-22    TPro  6.9  /  Alb  1.7<L>  /  TBili  1.0  /  DBili  x   /  AST  32  /  ALT  27  /  AlkPhos  125<H>  04-22            Review of Systems	  ventilated      Objective     Physical Examination    heart s1s2  lung dc BS      Pertinent Lab findings & Imaging      Woody:  NO   Adequate UO     I&O's Detail    21 Apr 2022 07:01  -  22 Apr 2022 07:00  --------------------------------------------------------  IN:    IV PiggyBack: 100 mL    Norepinephrine: 21.5 mL    PRBCs (Packed Red Blood Cells): 374 mL    Sodium Chloride 0.9% Bolus: 1550 mL  Total IN: 2045.5 mL    OUT:    Indwelling Catheter - Urethral (mL): 500 mL  Total OUT: 500 mL    Total NET: 1545.5 mL               Discussed with:     Cultures:	        Radiology    ACC: [id number] EXAM:  CT BRAIN                          PROCEDURE DATE:  04/21/2022          INTERPRETATION:  CLINICAL INDICATION: Sepsis    TECHNIQUE: CT of the head was performed without the administration of   intravenous contrast.    COMPARISON: 9/7/2020.    FINDINGS:  Severe diffuse parenchymal atrophy is again noted.    No acute intracranial hemorrhage. Ex vacuo dilatation of the lateral   ventricles.    There are periventricular white matter hypodensities that are nonspecific   in nature but may reflect chronic ischemic microvascular disease.  No extra-axial fluid collections.    The visualized intraorbital contents are unremarkable. Mild mucosal   thickening of the maxillary sinuses. The mastoid air cells are clear. The   visualized soft tissues and osseous structures appear normal.    IMPRESSION:    Severe diffuse parenchymal atrophy again noted. No acute intracranial   hemorrhage.    If the patient's symptoms persist, consider short interval follow-up head   CT or brain MRI if there are no MRI contraindications.    --- End of Report ---            RICHARD MERINO MD; Attending Radiologist  This document has been electronically signed. Apr 21 2022  3:01PM

## 2022-04-22 NOTE — PROVIDER CONTACT NOTE (EICU) - ACTION/TREATMENT ORDERED:
-portable CXR ordered  -requested LUE midline removal by bedside team as leaking per report  -250 cc LR bolus had been ordered through PIV in foot prior to central line placement, once line is confirmed, will give additional IVF and pressor support if indicated to maintain MAP>65
versed 1mg IVP x1

## 2022-04-22 NOTE — PROGRESS NOTE ADULT - ASSESSMENT
77 y/o F with PMH of HTN, DM type 2, CVA, recent Cardiac Arrest, Chronic Respiratory Failure, Vent Dependant s/p trach & PEG, Anemia with GI blood loss, and Dementia who was sent from [facility] facility for acute respiratory distress, fevers, hypotension.    Assessment:  1. Acute on chronic respiratory failure  2. Septic Shock  3. RYAN  4. Lactic acidosis   5. Anemia   6. Pleural effusion.   7. UTI     Plan:  Neuro: Mentation at baseline. CT head negative for acute pathology. Ativan PRN   CV: Actively titrating levophed to maintain MAP >65. Lactate 2.7->2.1 after IVF resuscitation. Holding antihypertensives in setting of shock  Resp: s/p Trach. On AC/VC, actively titrating FiO2 to maintain O2 sat >92%. Able to titrated down to 60%. Vent bundle in place. Aspiration precautions. Holding further IVF due to b/l pleural effusions.    GI: NPO. PPI daily.   Renal: RYAN likely ATN, s/p IVF resuscitation Woody Catheter for Strict I&Os. Avoid Nephrotoxins   - Lactic acidosis, s/p IVFs.  Trend lactate until cleared   ID: On Zosyn for UTI and possible pneumonia. s/p Vanc x1 dose. Blood and urine cultures pending. Send culture of PEG site.   Heme: Anemia, s/p 1 PRBC. H/H responded. No evidence of bleeding, likely chronic anemia. Trend H/H. Will transfuse for Hg <7.

## 2022-04-22 NOTE — PROGRESS NOTE ADULT - SUBJECTIVE AND OBJECTIVE BOX
[facility], Division of Infectious Diseases  MOIZ Lora Y. Patel, S. Shah, G. Casimir  [phone]  ([phone] - weekdays after 5pm and weekends)    Name: BREA BECKHAM  Age/Gender: 76y Female  MRN: [id number]    Interval History:  Patient seen this morning.   Notes reviewed.   started on low dose pressors this AM    Allergies: codeine (Hives)      Objective:  Vitals:   T(F): 98.8 (22 @ 12:45), Max: 98.8 (22 @ 00:29)  HR: 100 (22 @ 12:00) (80 - 109)  BP: 101/52 (22 @ 12:00) (75/40 - 125/56)  RR: 26 (22 @ 12:00) (13 - 37)  SpO2: 98% (22 @ 12:00) (92% - 100%)  Physical Examination:  General: no acute distress, nontoxic appearing  HEENT: anicteric  Cardio: S1, S2 present, tachycardic  Resp: tracheostomy, on vent  Abd: soft, ND  Ext: edema  Skin: warm, dry, no visible rash   Lines:    Laboratory Studies:  CBC:                       8.4    15.47 )-----------( 188      ( 2022 06:58 )             27.6     WBC Trend:  15.47 22 @ 06:58  14.93 22 @ 22:35  14.15 22 @ 13:04    CMP:     133<L>  |  99  |  41<H>  ----------------------------<  125<H>  3.9   |  27  |  1.10    Ca    8.9      2022 06:58  Phos  2.7       Mg     2.4         TPro  6.9  /  Alb  1.7<L>  /  TBili  1.0  /  DBili  x   /  AST  32  /  ALT  27  /  AlkPhos  125<H>        LIVER FUNCTIONS - ( 2022 06:58 )  Alb: 1.7 g/dL / Pro: 6.9 g/dL / ALK PHOS: 125 U/L / ALT: 27 U/L DA / AST: 32 U/L / GGT: x             Urinalysis Basic - ( 2022 13:04 )    Color: Yellow / Appearance: Turbid / S.020 / pH: x  Gluc: x / Ketone: Trace  / Bili: Negative / Urobili: Negative mg/dL   Blood: x / Protein: 500 mg/dL / Nitrite: Negative   Leuk Esterase: Moderate / RBC: 6-10 /HPF / WBC 11-25   Sq Epi: x / Non Sq Epi: Few / Bacteria: TNTC      Microbiology: reviewed     Radiology: reviewed     Medications:  acetaminophen     Tablet .. 650 milliGRAM(s) Oral every 6 hours PRN  ALBUTerol    90 MICROgram(s) HFA Inhaler 2 Puff(s) Inhalation every 6 hours PRN  chlorhexidine 0.12% Liquid 15 milliLiter(s) Oral Mucosa every 12 hours  chlorhexidine 2% Cloths 1 Application(s) Topical daily  dextrose 5%. 1000 milliLiter(s) IV Continuous <Continuous>  dextrose 5%. 1000 milliLiter(s) IV Continuous <Continuous>  dextrose 50% Injectable 25 Gram(s) IV Push once  dextrose 50% Injectable 12.5 Gram(s) IV Push once  dextrose 50% Injectable 25 Gram(s) IV Push once  dextrose Oral Gel 15 Gram(s) Oral once PRN  folic acid 1 milliGRAM(s) Oral daily  glucagon  Injectable 1 milliGRAM(s) IntraMuscular once  heparin   Injectable 5000 Unit(s) SubCutaneous every 12 hours  insulin lispro (ADMELOG) corrective regimen sliding scale   SubCutaneous every 6 hours  lactobacillus acidophilus 1 Tablet(s) Oral two times a day  LORazepam     Tablet 1 milliGRAM(s) Oral every 4 hours  methocarbamol 500 milliGRAM(s) Oral two times a day  mineral oil/petrolatum Hydrophilic Ointment 1 Application(s) Topical daily  norepinephrine Infusion 0.05 MICROgram(s)/kG/Min IV Continuous <Continuous>  pantoprazole   Suspension 40 milliGRAM(s) Oral daily  piperacillin/tazobactam IVPB.. 3.375 Gram(s) IV Intermittent every 8 hours  polyethylene glycol 3350 17 Gram(s) Oral two times a day  povidone iodine 10% Ointment 1 Application(s) Topical every 12 hours  senna 2 Tablet(s) Oral at bedtime  silver sulfADIAZINE 1% Cream 1 Application(s) Topical daily  simethicone 80 milliGRAM(s) Chew every 6 hours  sodium chloride 0.9% lock flush 10 milliLiter(s) IV Push every 1 hour PRN  sucralfate 1 Gram(s) Oral four times a day  tiotropium 18 MICROgram(s) Capsule 1 Capsule(s) Inhalation daily    Antimicrobials:  piperacillin/tazobactam IVPB.. 3.375 Gram(s) IV Intermittent every 8 hours

## 2022-04-22 NOTE — CONSULT NOTE ADULT - SUBJECTIVE AND OBJECTIVE BOX
History of Present Illness: The patient is a 76 year old female with a history of HTN, DM, CVA, dementia, chronic respiratory failure s/p trach, PEG, cardiac arrest, anemia who presented with respiratory distress, fevers, hypotension. The patient is unable to provide additional history.    Past Medical/Surgical History:    Medications:    Family History: Non-contributory family history of premature cardiovascular atherosclerotic disease    Social History: No tobacco, alcohol or drug use    Review of Systems:  Unable to obtain    Physical Exam:  Vitals:        Vital Signs Last 24 Hrs  T(C): 36.6 (22 Apr 2022 04:30), Max: 37.1 (22 Apr 2022 00:29)  T(F): 97.8 (22 Apr 2022 04:30), Max: 98.8 (22 Apr 2022 00:29)  HR: 106 (22 Apr 2022 09:00) (80 - 109)  BP: 97/54 (22 Apr 2022 08:00) (75/40 - 125/56)  BP(mean): 66 (22 Apr 2022 08:00) (51 - 85)  RR: 17 (22 Apr 2022 09:00) (14 - 37)  SpO2: 99% (22 Apr 2022 09:00) (92% - 100%)  General: Unresponsive  HEENT: Trach  Neck: No JVD, no carotid bruit  Lungs: Coarse bilaterally  CV: RRR, nl S1/S2, no M/R/G  Abdomen: S/NT/ND, +BS  Extremities: No LE edema, no cyanosis  Neuro: AAOx0  Skin: No rash    Labs:                        8.4    15.47 )-----------( 188      ( 22 Apr 2022 06:58 )             27.6     04-22    133<L>  |  99  |  41<H>  ----------------------------<  125<H>  3.9   |  27  |  1.10    Ca    8.9      22 Apr 2022 06:58  Phos  2.7     04-22  Mg     2.4     04-22    TPro  6.9  /  Alb  1.7<L>  /  TBili  1.0  /  DBili  x   /  AST  32  /  ALT  27  /  AlkPhos  125<H>  04-22        PT/INR - ( 21 Apr 2022 13:04 )   PT: 16.2 sec;   INR: 1.40 ratio         PTT - ( 21 Apr 2022 13:04 )  PTT:38.6 sec    Telemetry: Sinus rhythm

## 2022-04-22 NOTE — DIETITIAN INITIAL EVALUATION ADULT - ORAL INTAKE PTA/DIET HISTORY
Pt a resident of [facility] -  trach with PEG feedings.  Per transfer documents, receiving Glucerna 1.5 to goal 60ml/hr x 20hrs (provides 1200ml TV formula, 1800kcal, 99g protein; also receiving 250ml free water q 6hrs, +25ml hourly flushes)

## 2022-04-22 NOTE — DIETITIAN INITIAL EVALUATION ADULT - ENTERAL
[facility] Division of Kidney Diseases & Hypertension  FOLLOW UP NOTE  [phone]--------------------------------------------------------------------------------    HPI:  This is a 79 y/o man with history of dementia, DM, HTN who presents with abdominal pain with chills and was found to be in septic shock from pneumonia started on IV ABX. Pt. with no history of kidney disease in the past. Pt. was found to have oligiuric RUSH and was initiated on CVVHDF. CVVHDF was held on 5/30 and pt. was transitioned of IHD. Pt. last had HD on 5/30 and has been on hold since then. Patient seen and examined today. He is on 100% oxgen and is tachypneic.     PAST HISTORY  --------------------------------------------------------------------------------  No significant changes to PMH, PSH, FHx, SHx, unless otherwise noted    ALLERGIES & MEDICATIONS  --------------------------------------------------------------------------------  Allergies    No Known Allergies    Intolerances      Standing Inpatient Medications  chlorhexidine 4% Liquid 1 Application(s) Topical <User Schedule>  dextrose 5%. 1000 milliLiter(s) IV Continuous <Continuous>  dextrose 5%. 1000 milliLiter(s) IV Continuous <Continuous>  dextrose 50% Injectable 12.5 Gram(s) IV Push once  dextrose 50% Injectable 25 Gram(s) IV Push once  dextrose 50% Injectable 25 Gram(s) IV Push once  piperacillin/tazobactam IVPB. 3.375 Gram(s) IV Intermittent every 12 hours    PRN Inpatient Medications  dextrose 40% Gel 15 Gram(s) Oral once PRN  glucagon  Injectable 1 milliGRAM(s) IntraMuscular once PRN  HYDROmorphone  Injectable 1 milliGRAM(s) IV Push every 6 hours PRN      REVIEW OF SYSTEMS  --------------------------------------------------------------------------------  Unable to obtain.    VITALS/PHYSICAL EXAM  --------------------------------------------------------------------------------  T(C): 36.9 (06-12-18 @ 06:52), Max: 37 (06-11-18 @ 23:00)  HR: 91 (06-12-18 @ 07:45) (91 - 110)  BP: 118/55 (06-12-18 @ 06:52) (116/69 - 124/53)  RR: 17 (06-12-18 @ 07:45) (17 - 22)  SpO2: 99% (06-12-18 @ 07:45) (97% - 99%)  Wt(kg): --        Physical Exam:  	  	Gen: Elderly male, on face mask   	HEENT: no JVD  	Pulm: B/L crackles present.   	CV: RRR, S1S2;  	Abd: +BS, soft, nontender/nondistended               Extremities: no bilateral LE edema noted.  	Skin: Warm, without rashes              : nuzhat present.       LABS/STUDIES  --------------------------------------------------------------------------------              7.5    13.18 >-----------<  400      [06-12-18 @ 10:00]              24.4     157  |  105  |  94  ----------------------------<  299      [06-12-18 @ 10:00]  4.4   |  40  |  6.10        Ca     7.9     [06-12-18 @ 10:00]      Mg     2.4     [06-12-18 @ 10:00]      Phos  7.1     [06-12-18 @ 10:00]            Creatinine Trend:  SCr 6.10 [06-12 @ 10:00]  SCr 5.76 [06-11 @ 11:37]  SCr 5.00 [06-07 @ 05:45]  SCr 5.04 [06-06 @ 05:45]  SCr 4.79 [06-05 @ 02:35] Glucerna 1.5 to goal 60ml/hr x 20hrs via PEG; 30ml/hr free water flushes [facility] Division of Kidney Diseases & Hypertension  FOLLOW UP NOTE  [phone]--------------------------------------------------------------------------------    HPI:  This is a 81 y/o man with history of dementia, DM, HTN who presents with abdominal pain with chills and was found to be in septic shock from pneumonia started on IV ABX. Pt. with no history of kidney disease in the past. Pt. was found to have oligiuric RUSH and was initiated on CVVHDF. CVVHDF was held on 5/30 and pt. was transitioned of IHD. Pt. last had HD on 5/30 and has been on hold since then. Patient seen and examined today. He is on 100% oxgen and is tachypneic.     PAST HISTORY  --------------------------------------------------------------------------------  No significant changes to PMH, PSH, FHx, SHx, unless otherwise noted    ALLERGIES & MEDICATIONS  --------------------------------------------------------------------------------  Allergies    No Known Allergies    Intolerances      Standing Inpatient Medications  chlorhexidine 4% Liquid 1 Application(s) Topical <User Schedule>  dextrose 5%. 1000 milliLiter(s) IV Continuous <Continuous>  dextrose 5%. 1000 milliLiter(s) IV Continuous <Continuous>  dextrose 50% Injectable 12.5 Gram(s) IV Push once  dextrose 50% Injectable 25 Gram(s) IV Push once  dextrose 50% Injectable 25 Gram(s) IV Push once  piperacillin/tazobactam IVPB. 3.375 Gram(s) IV Intermittent every 12 hours    PRN Inpatient Medications  dextrose 40% Gel 15 Gram(s) Oral once PRN  glucagon  Injectable 1 milliGRAM(s) IntraMuscular once PRN  HYDROmorphone  Injectable 1 milliGRAM(s) IV Push every 6 hours PRN      REVIEW OF SYSTEMS  --------------------------------------------------------------------------------  Unable to obtain.    VITALS/PHYSICAL EXAM  --------------------------------------------------------------------------------  T(C): 36.9 (06-12-18 @ 06:52), Max: 37 (06-11-18 @ 23:00)  HR: 91 (06-12-18 @ 07:45) (91 - 110)  BP: 118/55 (06-12-18 @ 06:52) (116/69 - 124/53)  RR: 17 (06-12-18 @ 07:45) (17 - 22)  SpO2: 99% (06-12-18 @ 07:45) (97% - 99%)  Wt(kg): --        Physical Exam:  	  	Gen: Elderly male, on face mask   	HEENT: no JVD  	Pulm: B/L crackles present.   	CV: RRR, S1S2;  	Abd: +BS, soft, nontender/nondistended               Extremities: no bilateral LE edema noted.  	Skin: Warm, without rashes              : nuzhat present.       LABS/STUDIES  --------------------------------------------------------------------------------              7.5    13.18 >-----------<  400      [06-12-18 @ 10:00]              24.4     157  |  105  |  94  ----------------------------<  299      [06-12-18 @ 10:00]  4.4   |  40  |  6.10        Ca     7.9     [06-12-18 @ 10:00]      Mg     2.4     [06-12-18 @ 10:00]      Phos  7.1     [06-12-18 @ 10:00]    Creatinine Trend:  SCr 6.10 [06-12 @ 10:00]  SCr 5.76 [06-11 @ 11:37]  SCr 5.00 [06-07 @ 05:45]  SCr 5.04 [06-06 @ 05:45]  SCr 4.79 [06-05 @ 02:35]

## 2022-04-22 NOTE — DIETITIAN INITIAL EVALUATION ADULT - PERTINENT MEDS FT
MEDICATIONS  (STANDING):  chlorhexidine 0.12% Liquid 15 milliLiter(s) Oral Mucosa every 12 hours  chlorhexidine 2% Cloths 1 Application(s) Topical daily  dextrose 5%. 1000 milliLiter(s) (100 mL/Hr) IV Continuous <Continuous>  dextrose 5%. 1000 milliLiter(s) (50 mL/Hr) IV Continuous <Continuous>  dextrose 50% Injectable 25 Gram(s) IV Push once  dextrose 50% Injectable 12.5 Gram(s) IV Push once  dextrose 50% Injectable 25 Gram(s) IV Push once  folic acid 1 milliGRAM(s) Oral daily  glucagon  Injectable 1 milliGRAM(s) IntraMuscular once  heparin   Injectable 5000 Unit(s) SubCutaneous every 12 hours  insulin lispro (ADMELOG) corrective regimen sliding scale   SubCutaneous every 6 hours  lactobacillus acidophilus 1 Tablet(s) Oral two times a day  LORazepam     Tablet 1 milliGRAM(s) Oral every 4 hours  methocarbamol 500 milliGRAM(s) Oral two times a day  mineral oil/petrolatum Hydrophilic Ointment 1 Application(s) Topical daily  norepinephrine Infusion 0.05 MICROgram(s)/kG/Min (2.34 mL/Hr) IV Continuous <Continuous>  pantoprazole   Suspension 40 milliGRAM(s) Oral daily  piperacillin/tazobactam IVPB.. 3.375 Gram(s) IV Intermittent every 8 hours  polyethylene glycol 3350 17 Gram(s) Oral two times a day  povidone iodine 10% Ointment 1 Application(s) Topical every 12 hours  senna 2 Tablet(s) Oral at bedtime  silver sulfADIAZINE 1% Cream 1 Application(s) Topical daily  simethicone 80 milliGRAM(s) Chew every 6 hours  sucralfate 1 Gram(s) Oral four times a day  tiotropium 18 MICROgram(s) Capsule 1 Capsule(s) Inhalation daily    MEDICATIONS  (PRN):  acetaminophen     Tablet .. 650 milliGRAM(s) Oral every 6 hours PRN Temp greater or equal to 38C (100.4F)  ALBUTerol    90 MICROgram(s) HFA Inhaler 2 Puff(s) Inhalation every 6 hours PRN Shortness of Breath and/or Wheezing  dextrose Oral Gel 15 Gram(s) Oral once PRN Blood Glucose LESS THAN 70 milliGRAM(s)/deciliter  sodium chloride 0.9% lock flush 10 milliLiter(s) IV Push every 1 hour PRN Pre/post blood products, medications, blood draw, and to maintain line patency

## 2022-04-22 NOTE — PROGRESS NOTE ADULT - SUBJECTIVE AND OBJECTIVE BOX
Patient is a 76y old  Female who presents with a chief complaint of acute respiratory distress, fevers, hypotension.      INTERVAL HPI/OVERNIGHT EVENTS: Pt is nonverbal and cannot provide ROS. Pt still on levophed. RN reports pt with diarrhea, sent c diff PCR.     MEDICATIONS  (STANDING):  chlorhexidine 0.12% Liquid 15 milliLiter(s) Oral Mucosa every 12 hours  chlorhexidine 2% Cloths 1 Application(s) Topical daily  dextrose 5%. 1000 milliLiter(s) (100 mL/Hr) IV Continuous <Continuous>  dextrose 5%. 1000 milliLiter(s) (50 mL/Hr) IV Continuous <Continuous>  dextrose 50% Injectable 25 Gram(s) IV Push once  dextrose 50% Injectable 12.5 Gram(s) IV Push once  dextrose 50% Injectable 25 Gram(s) IV Push once  glucagon  Injectable 1 milliGRAM(s) IntraMuscular once  heparin   Injectable 5000 Unit(s) SubCutaneous every 12 hours  insulin lispro (ADMELOG) corrective regimen sliding scale   SubCutaneous every 6 hours  lactobacillus acidophilus 1 Tablet(s) Oral two times a day  LORazepam     Tablet 1 milliGRAM(s) Oral every 4 hours  methocarbamol 500 milliGRAM(s) Oral two times a day  mineral oil/petrolatum Hydrophilic Ointment 1 Application(s) Topical daily  norepinephrine Infusion 0.05 MICROgram(s)/kG/Min (2.34 mL/Hr) IV Continuous <Continuous>  pantoprazole   Suspension 40 milliGRAM(s) Oral daily  piperacillin/tazobactam IVPB.. 3.375 Gram(s) IV Intermittent every 8 hours  polyethylene glycol 3350 17 Gram(s) Oral two times a day  povidone iodine 10% Ointment 1 Application(s) Topical every 12 hours  senna 2 Tablet(s) Oral at bedtime  silver sulfADIAZINE 1% Cream 1 Application(s) Topical daily  simethicone 80 milliGRAM(s) Chew every 6 hours  sucralfate 1 Gram(s) Oral four times a day  tiotropium 18 MICROgram(s) Capsule 1 Capsule(s) Inhalation daily    MEDICATIONS  (PRN):  acetaminophen     Tablet .. 650 milliGRAM(s) Oral every 6 hours PRN Temp greater or equal to 38C (100.4F)  ALBUTerol    90 MICROgram(s) HFA Inhaler 2 Puff(s) Inhalation every 6 hours PRN Shortness of Breath and/or Wheezing  dextrose Oral Gel 15 Gram(s) Oral once PRN Blood Glucose LESS THAN 70 milliGRAM(s)/deciliter  sodium chloride 0.9% lock flush 10 milliLiter(s) IV Push every 1 hour PRN Pre/post blood products, medications, blood draw, and to maintain line patency      Allergies    codeine (Hives)    Intolerances        REVIEW OF SYSTEMS:  Pt is nonverbal and cannot provide ROS.    Vital Signs Last 24 Hrs  T(F): 98.8 (22 @ 12:45), Max: 98.8 (22 @ 00:29)  HR: 100 (22 @ 12:00) (80 - 109)  BP: 101/52 (22 @ 12:00) (75/40 - 125/56)  RR: 26 (22 @ 12:00) (13 - 37)  SpO2: 98% (22 @ 12:00) (92% - 100%)    PHYSICAL EXAM:  GENERAL: chronically ill-appearing  HEENT:  anicteric, dry mucous membranes  CHEST/LUNG:  decreased breath sounds, trach/vent in place  HEART:  RRR, S1, S2  ABDOMEN:  BS+, soft, no apparent tenderness, nondistended, PEG in place  EXTREMITIES: no cyanosis or apparent calf tenderness; contracted UEs  NERVOUS SYSTEM: awake; does not answer any questions or follow any commands     LABS:                        8.4    15.47 )-----------( 188      ( 2022 06:58 )             27.6     CBC Full  -  ( 2022 06:58 )  WBC Count : 15.47 K/uL  Hemoglobin : 8.4 g/dL  Hematocrit : 27.6 %  Platelet Count - Automated : 188 K/uL  Mean Cell Volume : 79.1 fl  Mean Cell Hemoglobin : 24.1 pg  Mean Cell Hemoglobin Concentration : 30.4 gm/dL  Auto Neutrophil # : 13.18 K/uL  Auto Lymphocyte # : 0.98 K/uL  Auto Monocyte # : 1.04 K/uL  Auto Eosinophil # : 0.15 K/uL  Auto Basophil # : 0.03 K/uL  Auto Neutrophil % : 85.2 %  Auto Lymphocyte % : 6.3 %  Auto Monocyte % : 6.7 %  Auto Eosinophil % : 1.0 %  Auto Basophil % : 0.2 %    2022 06:58    133    |  99     |  41     ----------------------------<  125    3.9     |  27     |  1.10     Ca    8.9        2022 06:58  Phos  2.7       2022 06:58  Mg     2.4       2022 06:58    TPro  6.9    /  Alb  1.7    /  TBili  1.0    /  DBili  x      /  AST  32     /  ALT  27     /  AlkPhos  125    2022 06:58    PT/INR - ( 2022 13:04 )   PT: 16.2 sec;   INR: 1.40 ratio         PTT - ( 2022 13:04 )  PTT:38.6 sec  Urinalysis Basic - ( 2022 13:04 )    Color: Yellow / Appearance: Turbid / S.020 / pH: x  Gluc: x / Ketone: Trace  / Bili: Negative / Urobili: Negative mg/dL   Blood: x / Protein: 500 mg/dL / Nitrite: Negative   Leuk Esterase: Moderate / RBC: 6-10 /HPF / WBC 11-25   Sq Epi: x / Non Sq Epi: Few / Bacteria: TNTC      CAPILLARY BLOOD GLUCOSE      POCT Blood Glucose.: 133 mg/dL (2022 11:31)  POCT Blood Glucose.: 118 mg/dL (2022 05:18)          RADIOLOGY & ADDITIONAL TESTS:    Personally reviewed.     Consultant(s) Notes Reviewed:  [x] YES  [ ] NO     Patient is a 76y old  Female who presents with a chief complaint of acute respiratory distress, fevers, hypotension.       INTERVAL HPI/OVERNIGHT EVENTS: Pt is nonverbal and cannot provide ROS. Pt still on levophed. RN reports pt with diarrhea, sent c diff PCR.     MEDICATIONS  (STANDING):  chlorhexidine 0.12% Liquid 15 milliLiter(s) Oral Mucosa every 12 hours  chlorhexidine 2% Cloths 1 Application(s) Topical daily  dextrose 5%. 1000 milliLiter(s) (100 mL/Hr) IV Continuous <Continuous>  dextrose 5%. 1000 milliLiter(s) (50 mL/Hr) IV Continuous <Continuous>  dextrose 50% Injectable 25 Gram(s) IV Push once  dextrose 50% Injectable 12.5 Gram(s) IV Push once  dextrose 50% Injectable 25 Gram(s) IV Push once  glucagon  Injectable 1 milliGRAM(s) IntraMuscular once  heparin   Injectable 5000 Unit(s) SubCutaneous every 12 hours  insulin lispro (ADMELOG) corrective regimen sliding scale   SubCutaneous every 6 hours  lactobacillus acidophilus 1 Tablet(s) Oral two times a day  LORazepam     Tablet 1 milliGRAM(s) Oral every 4 hours  methocarbamol 500 milliGRAM(s) Oral two times a day  mineral oil/petrolatum Hydrophilic Ointment 1 Application(s) Topical daily  norepinephrine Infusion 0.05 MICROgram(s)/kG/Min (2.34 mL/Hr) IV Continuous <Continuous>  pantoprazole   Suspension 40 milliGRAM(s) Oral daily  piperacillin/tazobactam IVPB.. 3.375 Gram(s) IV Intermittent every 8 hours  polyethylene glycol 3350 17 Gram(s) Oral two times a day  povidone iodine 10% Ointment 1 Application(s) Topical every 12 hours  senna 2 Tablet(s) Oral at bedtime  silver sulfADIAZINE 1% Cream 1 Application(s) Topical daily  simethicone 80 milliGRAM(s) Chew every 6 hours  sucralfate 1 Gram(s) Oral four times a day  tiotropium 18 MICROgram(s) Capsule 1 Capsule(s) Inhalation daily    MEDICATIONS  (PRN):  acetaminophen     Tablet .. 650 milliGRAM(s) Oral every 6 hours PRN Temp greater or equal to 38C (100.4F)  ALBUTerol    90 MICROgram(s) HFA Inhaler 2 Puff(s) Inhalation every 6 hours PRN Shortness of Breath and/or Wheezing  dextrose Oral Gel 15 Gram(s) Oral once PRN Blood Glucose LESS THAN 70 milliGRAM(s)/deciliter  sodium chloride 0.9% lock flush 10 milliLiter(s) IV Push every 1 hour PRN Pre/post blood products, medications, blood draw, and to maintain line patency      Allergies    codeine (Hives)    Intolerances        REVIEW OF SYSTEMS:  Pt is nonverbal and cannot provide ROS.    Vital Signs Last 24 Hrs  T(F): 98.8 (22 @ 12:45), Max: 98.8 (22 @ 00:29)  HR: 100 (22 @ 12:00) (80 - 109)  BP: 101/52 (22 @ 12:00) (75/40 - 125/56)  RR: 26 (22 @ 12:00) (13 - 37)  SpO2: 98% (22 @ 12:00) (92% - 100%)    PHYSICAL EXAM:  GENERAL: chronically ill-appearing  HEENT:  anicteric, dry mucous membranes  CHEST/LUNG:  decreased breath sounds, trach/vent in place  HEART:  RRR, S1, S2  ABDOMEN:  BS+, soft, no apparent tenderness, nondistended, PEG in place  EXTREMITIES: no cyanosis or apparent calf tenderness; contracted UEs  NERVOUS SYSTEM: awake; does not answer any questions or follow any commands     LABS:                        8.4    15.47 )-----------( 188      ( 2022 06:58 )             27.6     CBC Full  -  ( 2022 06:58 )  WBC Count : 15.47 K/uL  Hemoglobin : 8.4 g/dL  Hematocrit : 27.6 %  Platelet Count - Automated : 188 K/uL  Mean Cell Volume : 79.1 fl  Mean Cell Hemoglobin : 24.1 pg  Mean Cell Hemoglobin Concentration : 30.4 gm/dL  Auto Neutrophil # : 13.18 K/uL  Auto Lymphocyte # : 0.98 K/uL  Auto Monocyte # : 1.04 K/uL  Auto Eosinophil # : 0.15 K/uL  Auto Basophil # : 0.03 K/uL  Auto Neutrophil % : 85.2 %  Auto Lymphocyte % : 6.3 %  Auto Monocyte % : 6.7 %  Auto Eosinophil % : 1.0 %  Auto Basophil % : 0.2 %    2022 06:58    133    |  99     |  41     ----------------------------<  125    3.9     |  27     |  1.10     Ca    8.9        2022 06:58  Phos  2.7       2022 06:58  Mg     2.4       2022 06:58    TPro  6.9    /  Alb  1.7    /  TBili  1.0    /  DBili  x      /  AST  32     /  ALT  27     /  AlkPhos  125    2022 06:58    PT/INR - ( 2022 13:04 )   PT: 16.2 sec;   INR: 1.40 ratio         PTT - ( 2022 13:04 )  PTT:38.6 sec  Urinalysis Basic - ( 2022 13:04 )    Color: Yellow / Appearance: Turbid / S.020 / pH: x  Gluc: x / Ketone: Trace  / Bili: Negative / Urobili: Negative mg/dL   Blood: x / Protein: 500 mg/dL / Nitrite: Negative   Leuk Esterase: Moderate / RBC: 6-10 /HPF / WBC 11-25   Sq Epi: x / Non Sq Epi: Few / Bacteria: TNTC      CAPILLARY BLOOD GLUCOSE      POCT Blood Glucose.: 133 mg/dL (2022 11:31)  POCT Blood Glucose.: 118 mg/dL (2022 05:18)          RADIOLOGY & ADDITIONAL TESTS:    Personally reviewed.     Consultant(s) Notes Reviewed:  [x] YES  [ ] NO

## 2022-04-22 NOTE — PROGRESS NOTE ADULT - SUBJECTIVE AND OBJECTIVE BOX
===[INTERVAL HX: ]===  Patient seen and examined;  Chart reviewed and events noted;     trach on vent  no events noted    ===[HEALTH ISSUES]===      HEALTH ISSUES - PROBLEM Dx:  Anemia of chronic disease      ===[MEDICATIONS]===  MEDICATIONS  (STANDING):  chlorhexidine 0.12% Liquid 15 milliLiter(s) Oral Mucosa every 12 hours  chlorhexidine 2% Cloths 1 Application(s) Topical daily  dextrose 5%. 1000 milliLiter(s) (100 mL/Hr) IV Continuous <Continuous>  dextrose 5%. 1000 milliLiter(s) (50 mL/Hr) IV Continuous <Continuous>  dextrose 50% Injectable 25 Gram(s) IV Push once  dextrose 50% Injectable 12.5 Gram(s) IV Push once  dextrose 50% Injectable 25 Gram(s) IV Push once  glucagon  Injectable 1 milliGRAM(s) IntraMuscular once  heparin   Injectable 5000 Unit(s) SubCutaneous every 12 hours  insulin lispro (ADMELOG) corrective regimen sliding scale   SubCutaneous every 6 hours  lactobacillus acidophilus 1 Tablet(s) Oral two times a day  LORazepam     Tablet 1 milliGRAM(s) Oral every 4 hours  methocarbamol 500 milliGRAM(s) Oral two times a day  mineral oil/petrolatum Hydrophilic Ointment 1 Application(s) Topical daily  norepinephrine Infusion 0.05 MICROgram(s)/kG/Min (2.34 mL/Hr) IV Continuous <Continuous>  pantoprazole   Suspension 40 milliGRAM(s) Oral daily  piperacillin/tazobactam IVPB.. 3.375 Gram(s) IV Intermittent every 8 hours  polyethylene glycol 3350 17 Gram(s) Oral two times a day  povidone iodine 10% Ointment 1 Application(s) Topical every 12 hours  senna 2 Tablet(s) Oral at bedtime  silver sulfADIAZINE 1% Cream 1 Application(s) Topical daily  simethicone 80 milliGRAM(s) Chew every 6 hours  sucralfate 1 Gram(s) Oral four times a day  tiotropium 18 MICROgram(s) Capsule 1 Capsule(s) Inhalation daily    MEDICATIONS  (PRN):  acetaminophen     Tablet .. 650 milliGRAM(s) Oral every 6 hours PRN Temp greater or equal to 38C (100.4F)  ALBUTerol    90 MICROgram(s) HFA Inhaler 2 Puff(s) Inhalation every 6 hours PRN Shortness of Breath and/or Wheezing  dextrose Oral Gel 15 Gram(s) Oral once PRN Blood Glucose LESS THAN 70 milliGRAM(s)/deciliter  sodium chloride 0.9% lock flush 10 milliLiter(s) IV Push every 1 hour PRN Pre/post blood products, medications, blood draw, and to maintain line patency      ===[VITALS]===  Vital Signs Last 24 Hrs  T(C): 36.6 (2022 04:30), Max: 37.1 (2022 00:29)  T(F): 97.8 (2022 04:30), Max: 98.8 (2022 00:29)  HR: 106 (2022 09:00) (80 - 109)  BP: 97/54 (2022 08:00) (75/40 - 125/56)  BP(mean): 66 (2022 08:00) (51 - 85)  RR: 17 (2022 09:00) (14 - 37)  SpO2: 99% (2022 09:00) (92% - 100%)  [WT/HT]  Daily Height in cm: 165.1 (2022 12:41)    Daily Weight in k.2 (2022 04:30)  [VENT]  Mode: AC/ CMV (Assist Control/ Continuous Mandatory Ventilation)  RR (machine): 18  TV (machine): 400  FiO2: 60  PEEP: 5  ITime: 1  MAP: 11  PIP: 27    ===[PHYSICAL EXAM]===  General: WN,  WD adult in NAD.  HEENT:  anicteric sclera, pink conjunctivae, trach in place to vent  Neck: supple, no masses.  coarse BS  PEG in place  Ext: no clubbing, no cyanosis,  Skin: no rashes,  no petechiae, no venous stasis changes.  Neuro: alert and oriented X 0, no focal motor deficits.  LN: no SC SONAM.        ===[LABS:]===                        8.4    15.47 )-----------( 188      ( 2022 06:58 )             27.6     04-22    133<L>  |  99  |  41<H>  ----------------------------<  125<H>  3.9   |  27  |  1.10    Ca    8.9      2022 06:58  Phos  2.7       Mg     2.4         TPro  6.9  /  Alb  1.7<L>  /  TBili  1.0  /  DBili  x   /  AST  32  /  ALT  27  /  AlkPhos  125<H>      PT/INR - ( 2022 13:04 )   PT: 16.2 sec;   INR: 1.40 ratio         PTT - ( 2022 13:04 )  PTT:38.6 sec      Ferritin, Serum: 413 ng/mL (22 @ 22:40)  Vitamin B12, Serum: 1118 pg/mL (22 @ 22:40)    Urinalysis Basic - ( 2022 13:04 )    Color: Yellow / Appearance: Turbid / S.020 / pH: x  Gluc: x / Ketone: Trace  / Bili: Negative / Urobili: Negative mg/dL   Blood: x / Protein: 500 mg/dL / Nitrite: Negative   Leuk Esterase: Moderate / RBC: 6-10 /HPF / WBC 11-25   Sq Epi: x / Non Sq Epi: Few / Bacteria: TNTC        SARS-CoV-2: NotDetec (2022 13:04)  COVID-19 PCR: NotDetec (2022 12:03)  SARS-CoV-2: NotDetec (2022 21:21)  COVID-19 PCR: NotDetec (20 Dec 2021 07:28)  COVID-19 PCR: NotDetec (13 Dec 2021 19:12)  SARS-CoV-2: NotDetec (08 Dec 2021 11:59)  COVID-19 PCR: NotDetec (10 Nov 2021 10:27)  SARS-CoV-2: NotDetec (2021 23:12)              ===[RADIOLOGY STUDIES:]===

## 2022-04-22 NOTE — PROVIDER CONTACT NOTE (EICU) - ASSESSMENT
77 yo F with sepsis due to UTI, with poor venous access.
- vitals currently stable  - O2 sat 100% on vent with FIO2: 60%  - pt tachypneic with accessory muscle use  - CXR with bilateral infiltrates and vascular congestion  - currently not on standing IV sedatives, may benefit from increased ATC ativan dosage via PEG if she has break through vent dyssynchrony vs initiating on IV sedation (ie precedex)

## 2022-04-22 NOTE — PROGRESS NOTE ADULT - ASSESSMENT
77 y/o F with PMH of HTN, DM type 2, CVA, recent Cardiac Arrest, Chronic Respiratory Failure, Vent Dependant s/p trach & PEG, Anemia with GI blood loss, and Dementia who was sent from [facility] facility for acute respiratory distress, fevers, hypotension. Patient unable to contribute to hx due to mental status.  In the ED: wbc 14.15, HGB 6.0, INR 1.40, sodium 134, cr 1.36, lactate 2.8. UA sig for mod LE, large blood, wbc, bacteria. Given Zosyn and ns.     RECOMMENDATIONS  c/w zosyn for now  f/u blood cx  f/u urine culture  f/u sputum culture  nurse reports diarrhea, f/u C Diff PCR    Dr. Andressa Brantley will be covering this weekend   Emil Medellin M.D.  [facility], Division of Infectious Diseases  [phone]  After 5pm on weekdays and all day on weekends - please call [phone]

## 2022-04-23 LAB
ALBUMIN SERPL ELPH-MCNC: 1.6 G/DL — LOW (ref 3.3–5)
ALP SERPL-CCNC: 157 U/L — HIGH (ref 30–120)
ALT FLD-CCNC: 27 U/L DA — SIGNIFICANT CHANGE UP (ref 10–60)
ANION GAP SERPL CALC-SCNC: 6 MMOL/L — SIGNIFICANT CHANGE UP (ref 5–17)
AST SERPL-CCNC: 24 U/L — SIGNIFICANT CHANGE UP (ref 10–40)
BASOPHILS # BLD AUTO: 0.03 K/UL — SIGNIFICANT CHANGE UP (ref 0–0.2)
BASOPHILS NFR BLD AUTO: 0.3 % — SIGNIFICANT CHANGE UP (ref 0–2)
BILIRUB SERPL-MCNC: 0.6 MG/DL — SIGNIFICANT CHANGE UP (ref 0.2–1.2)
BUN SERPL-MCNC: 40 MG/DL — HIGH (ref 7–23)
CALCIUM SERPL-MCNC: 8.9 MG/DL — SIGNIFICANT CHANGE UP (ref 8.4–10.5)
CHLORIDE SERPL-SCNC: 98 MMOL/L — SIGNIFICANT CHANGE UP (ref 96–108)
CO2 SERPL-SCNC: 27 MMOL/L — SIGNIFICANT CHANGE UP (ref 22–31)
CREAT SERPL-MCNC: 1.2 MG/DL — SIGNIFICANT CHANGE UP (ref 0.5–1.3)
EGFR: 46 ML/MIN/1.73M2 — LOW
EOSINOPHIL # BLD AUTO: 0.19 K/UL — SIGNIFICANT CHANGE UP (ref 0–0.5)
EOSINOPHIL NFR BLD AUTO: 1.7 % — SIGNIFICANT CHANGE UP (ref 0–6)
GLUCOSE BLDC GLUCOMTR-MCNC: 188 MG/DL — HIGH (ref 70–99)
GLUCOSE BLDC GLUCOMTR-MCNC: 195 MG/DL — HIGH (ref 70–99)
GLUCOSE BLDC GLUCOMTR-MCNC: 207 MG/DL — HIGH (ref 70–99)
GLUCOSE BLDC GLUCOMTR-MCNC: 226 MG/DL — HIGH (ref 70–99)
GLUCOSE SERPL-MCNC: 202 MG/DL — HIGH (ref 70–99)
HCT VFR BLD CALC: 24.9 % — LOW (ref 34.5–45)
HGB BLD-MCNC: 7.7 G/DL — LOW (ref 11.5–15.5)
IMM GRANULOCYTES NFR BLD AUTO: 0.4 % — SIGNIFICANT CHANGE UP (ref 0–1.5)
IRON SATN MFR SERPL: 6 % — LOW (ref 14–50)
IRON SATN MFR SERPL: 9 UG/DL — LOW (ref 30–160)
LYMPHOCYTES # BLD AUTO: 0.63 K/UL — LOW (ref 1–3.3)
LYMPHOCYTES # BLD AUTO: 5.6 % — LOW (ref 13–44)
MAGNESIUM SERPL-MCNC: 2.4 MG/DL — SIGNIFICANT CHANGE UP (ref 1.6–2.6)
MCHC RBC-ENTMCNC: 24 PG — LOW (ref 27–34)
MCHC RBC-ENTMCNC: 30.9 GM/DL — LOW (ref 32–36)
MCV RBC AUTO: 77.6 FL — LOW (ref 80–100)
MONOCYTES # BLD AUTO: 0.62 K/UL — SIGNIFICANT CHANGE UP (ref 0–0.9)
MONOCYTES NFR BLD AUTO: 5.5 % — SIGNIFICANT CHANGE UP (ref 2–14)
NEUTROPHILS # BLD AUTO: 9.83 K/UL — HIGH (ref 1.8–7.4)
NEUTROPHILS NFR BLD AUTO: 86.5 % — HIGH (ref 43–77)
NRBC # BLD: 0 /100 WBCS — SIGNIFICANT CHANGE UP (ref 0–0)
PHOSPHATE SERPL-MCNC: 2.6 MG/DL — SIGNIFICANT CHANGE UP (ref 2.5–4.5)
PLATELET # BLD AUTO: 210 K/UL — SIGNIFICANT CHANGE UP (ref 150–400)
POTASSIUM SERPL-MCNC: 3.8 MMOL/L — SIGNIFICANT CHANGE UP (ref 3.5–5.3)
POTASSIUM SERPL-SCNC: 3.8 MMOL/L — SIGNIFICANT CHANGE UP (ref 3.5–5.3)
PROT SERPL-MCNC: 6.8 G/DL — SIGNIFICANT CHANGE UP (ref 6–8.3)
RBC # BLD: 3.21 M/UL — LOW (ref 3.8–5.2)
RBC # FLD: 18.3 % — HIGH (ref 10.3–14.5)
SODIUM SERPL-SCNC: 131 MMOL/L — LOW (ref 135–145)
TIBC SERPL-MCNC: 159 UG/DL — LOW (ref 220–430)
UIBC SERPL-MCNC: 150 UG/DL — SIGNIFICANT CHANGE UP (ref 110–370)
WBC # BLD: 11.35 K/UL — HIGH (ref 3.8–10.5)
WBC # FLD AUTO: 11.35 K/UL — HIGH (ref 3.8–10.5)

## 2022-04-23 PROCEDURE — 99233 SBSQ HOSP IP/OBS HIGH 50: CPT

## 2022-04-23 RX ORDER — SODIUM CHLORIDE 9 MG/ML
1 INJECTION INTRAMUSCULAR; INTRAVENOUS; SUBCUTANEOUS
Refills: 0 | Status: COMPLETED | OUTPATIENT
Start: 2022-04-23 | End: 2022-04-25

## 2022-04-23 RX ADMIN — METHOCARBAMOL 500 MILLIGRAM(S): 500 TABLET, FILM COATED ORAL at 05:17

## 2022-04-23 RX ADMIN — HEPARIN SODIUM 5000 UNIT(S): 5000 INJECTION INTRAVENOUS; SUBCUTANEOUS at 17:15

## 2022-04-23 RX ADMIN — Medication 1 APPLICATION(S): at 12:02

## 2022-04-23 RX ADMIN — Medication 4: at 11:44

## 2022-04-23 RX ADMIN — Medication 4: at 23:55

## 2022-04-23 RX ADMIN — Medication 1 MILLIGRAM(S): at 03:19

## 2022-04-23 RX ADMIN — PIPERACILLIN AND TAZOBACTAM 25 GRAM(S): 4; .5 INJECTION, POWDER, LYOPHILIZED, FOR SOLUTION INTRAVENOUS at 13:05

## 2022-04-23 RX ADMIN — Medication 1 MILLIGRAM(S): at 19:47

## 2022-04-23 RX ADMIN — Medication 1 MILLIGRAM(S): at 11:39

## 2022-04-23 RX ADMIN — Medication 2: at 17:28

## 2022-04-23 RX ADMIN — Medication 1 MILLIGRAM(S): at 09:13

## 2022-04-23 RX ADMIN — POLYETHYLENE GLYCOL 3350 17 GRAM(S): 17 POWDER, FOR SOLUTION ORAL at 17:15

## 2022-04-23 RX ADMIN — SIMETHICONE 80 MILLIGRAM(S): 80 TABLET, CHEWABLE ORAL at 23:55

## 2022-04-23 RX ADMIN — TIOTROPIUM BROMIDE 1 CAPSULE(S): 18 CAPSULE ORAL; RESPIRATORY (INHALATION) at 05:19

## 2022-04-23 RX ADMIN — Medication 1 MILLIGRAM(S): at 17:15

## 2022-04-23 RX ADMIN — Medication 1 MILLIGRAM(S): at 07:46

## 2022-04-23 RX ADMIN — CHLORHEXIDINE GLUCONATE 1 APPLICATION(S): 213 SOLUTION TOPICAL at 11:49

## 2022-04-23 RX ADMIN — Medication 1 TABLET(S): at 17:15

## 2022-04-23 RX ADMIN — Medication 1 APPLICATION(S): at 11:50

## 2022-04-23 RX ADMIN — Medication 1 APPLICATION(S): at 17:28

## 2022-04-23 RX ADMIN — Medication 1 MILLIGRAM(S): at 13:05

## 2022-04-23 RX ADMIN — CHLORHEXIDINE GLUCONATE 15 MILLILITER(S): 213 SOLUTION TOPICAL at 17:28

## 2022-04-23 RX ADMIN — Medication 1 TABLET(S): at 05:17

## 2022-04-23 RX ADMIN — PANTOPRAZOLE SODIUM 40 MILLIGRAM(S): 20 TABLET, DELAYED RELEASE ORAL at 11:41

## 2022-04-23 RX ADMIN — Medication 1 MILLIGRAM(S): at 23:55

## 2022-04-23 RX ADMIN — SIMETHICONE 80 MILLIGRAM(S): 80 TABLET, CHEWABLE ORAL at 05:18

## 2022-04-23 RX ADMIN — Medication 1 GRAM(S): at 17:15

## 2022-04-23 RX ADMIN — SODIUM CHLORIDE 1 GRAM(S): 9 INJECTION INTRAMUSCULAR; INTRAVENOUS; SUBCUTANEOUS at 21:28

## 2022-04-23 RX ADMIN — METHOCARBAMOL 500 MILLIGRAM(S): 500 TABLET, FILM COATED ORAL at 17:15

## 2022-04-23 RX ADMIN — SIMETHICONE 80 MILLIGRAM(S): 80 TABLET, CHEWABLE ORAL at 11:50

## 2022-04-23 RX ADMIN — Medication 2: at 05:18

## 2022-04-23 RX ADMIN — Medication 1 GRAM(S): at 05:17

## 2022-04-23 RX ADMIN — PIPERACILLIN AND TAZOBACTAM 25 GRAM(S): 4; .5 INJECTION, POWDER, LYOPHILIZED, FOR SOLUTION INTRAVENOUS at 21:28

## 2022-04-23 RX ADMIN — Medication 1 MILLIGRAM(S): at 18:01

## 2022-04-23 RX ADMIN — Medication 1 MILLIGRAM(S): at 11:41

## 2022-04-23 RX ADMIN — Medication 1 APPLICATION(S): at 05:18

## 2022-04-23 RX ADMIN — Medication 1 GRAM(S): at 11:41

## 2022-04-23 RX ADMIN — PIPERACILLIN AND TAZOBACTAM 25 GRAM(S): 4; .5 INJECTION, POWDER, LYOPHILIZED, FOR SOLUTION INTRAVENOUS at 05:17

## 2022-04-23 RX ADMIN — SIMETHICONE 80 MILLIGRAM(S): 80 TABLET, CHEWABLE ORAL at 17:28

## 2022-04-23 RX ADMIN — Medication 1 GRAM(S): at 23:55

## 2022-04-23 RX ADMIN — CHLORHEXIDINE GLUCONATE 15 MILLILITER(S): 213 SOLUTION TOPICAL at 05:17

## 2022-04-23 RX ADMIN — HEPARIN SODIUM 5000 UNIT(S): 5000 INJECTION INTRAVENOUS; SUBCUTANEOUS at 05:17

## 2022-04-23 NOTE — PROGRESS NOTE ADULT - NSPROGADDITIONALINFOA_GEN_ALL_CORE
TRANSFER - IN REPORT:    Verbal report received from Albertina Aguirre RN(name) on Claire Ferguson  being received from ED(unit) for routine progression of care      Report consisted of patients Situation, Background, Assessment and   Recommendations(SBAR). Information from the following report(s) SBAR, Kardex, ED Summary, Intake/Output and Recent Results was reviewed with the receiving nurse. Opportunity for questions and clarification was provided. Assessment completed upon patients arrival to unit and care assumed.      Primary Nurse Rodrigo Ellison RN and Eloise Hammond RN performed a dual skin assessment on this patient right flank open wound no drainage, callous to left heel, surgical scar to mid chest  Devonte score is 23 time spent 45 minutes

## 2022-04-23 NOTE — PROGRESS NOTE ADULT - SUBJECTIVE AND OBJECTIVE BOX
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     At bedside patient on full mechanical ventilation 60 % FiO2. Contracted and unresponsive.     Allergies: codeine    PAST MEDICAL & SURGICAL HISTORY:  Dementia of frontal lobe type    Aphasic stroke    Diabetes mellitus    Respiratory failure    Hypertension    GERD (gastroesophageal reflux disease)    Constipation    Respiratory failure    CVA (cerebral vascular accident)    HTN (hypertension)    DM (diabetes mellitus)    Advanced dementia    COVID-19 virus detected    Quadriplegia    Pneumonia    Type II diabetes mellitus    Hx of appendectomy    Gastrostomy in place    Tracheostomy in place    Tracheostomy tube present    Feeding by G-tube      FAMILY HISTORY:  No pertinent family history in first degree relatives      SOCIAL HISTORY:    Home Medications:    Review of Systems:  Unable to participate in ROS    T(F): 98.4 (04-23-22 @ 16:25), Max: 98.8 (04-23-22 @ 03:39)  HR: 81 (04-23-22 @ 19:15) (81 - 108)  BP: 93/57 (04-23-22 @ 19:00) (70/44 - 121/64)  RR: 19 (04-23-22 @ 19:00) (12 - 36)  SpO2: 100% (04-23-22 @ 19:15)  Wt(kg): --  Mode: AC/ CMV (Assist Control/ Continuous Mandatory Ventilation), RR (machine): 18, TV (machine): 400, FiO2: 50, PEEP: 5  CAPILLARY BLOOD GLUCOSE      POCT Blood Glucose.: 195 mg/dL (23 Apr 2022 17:27)    I&O's Summary    22 Apr 2022 07:01  -  23 Apr 2022 07:00  --------------------------------------------------------  IN: 629.3 mL / OUT: 1150 mL / NET: -520.7 mL    23 Apr 2022 07:01  -  23 Apr 2022 19:50  --------------------------------------------------------  IN: 1300 mL / OUT: 700 mL / NET: 600 mL        Physical Exam:     Gen: chronically ill appearing  Neuro: Contracted. Does not follow commands  CVS: +S1S2  Resp: +Trach; coarse  Abd: +PEG  Ext: warm, dry, no edema  Skin: well perfused    Meds:  piperacillin/tazobactam IVPB.. 3.375 Gram(s) IV Intermittent every 8 hours    norepinephrine Infusion 0.05 MICROgram(s)/kG/Min IV Continuous <Continuous>     dextrose 50% Injectable 25 Gram(s) IV Push once  dextrose 50% Injectable 12.5 Gram(s) IV Push once  dextrose 50% Injectable 25 Gram(s) IV Push once  dextrose Oral Gel 15 Gram(s) Oral once PRN  glucagon  Injectable 1 milliGRAM(s) IntraMuscular once  insulin lispro (ADMELOG) corrective regimen sliding scale   SubCutaneous every 6 hours     ALBUTerol    90 MICROgram(s) HFA Inhaler 2 Puff(s) Inhalation every 6 hours PRN     acetaminophen     Tablet .. 650 milliGRAM(s) Oral every 6 hours PRN  LORazepam     Tablet 1 milliGRAM(s) Oral every 4 hours  LORazepam   Injectable 1 milliGRAM(s) IV Push every 6 hours PRN  methocarbamol 500 milliGRAM(s) Oral two times a day        heparin   Injectable 5000 Unit(s) SubCutaneous every 12 hours     pantoprazole   Suspension 40 milliGRAM(s) Oral daily  polyethylene glycol 3350 17 Gram(s) Oral two times a day  senna 2 Tablet(s) Oral at bedtime  simethicone 80 milliGRAM(s) Chew every 6 hours  sucralfate 1 Gram(s) Oral four times a day        dextrose 5%. 1000 milliLiter(s) IV Continuous <Continuous>  dextrose 5%. 1000 milliLiter(s) IV Continuous <Continuous>  folic acid 1 milliGRAM(s) Oral daily  sodium chloride 1 Gram(s) Oral two times a day  sodium chloride 0.9% lock flush 10 milliLiter(s) IV Push every 1 hour PRN        chlorhexidine 0.12% Liquid 15 milliLiter(s) Oral Mucosa every 12 hours  chlorhexidine 2% Cloths 1 Application(s) Topical daily  mineral oil/petrolatum Hydrophilic Ointment 1 Application(s) Topical daily  povidone iodine 10% Ointment 1 Application(s) Topical every 12 hours  silver sulfADIAZINE 1% Cream 1 Application(s) Topical daily     lactobacillus acidophilus 1 Tablet(s) Oral two times a day                           7.7    11.35 )-----------( 210      ( 23 Apr 2022 06:07 )             24.9       04-23    131<L>  |  98  |  40<H>  ----------------------------<  202<H>  3.8   |  27  |  1.20    Ca    8.9      23 Apr 2022 06:07  Phos  2.6     04-23  Mg     2.4     04-23    TPro  6.8  /  Alb  1.6<L>  /  TBili  0.6  /  DBili  x   /  AST  24  /  ALT  27  /  AlkPhos  157<H>  04-23              Clean Catch Clean Catch (Midstream)   >100,000 CFU/ml Escherichia coli -- 04-21 @ 18:36  .Blood Blood-Peripheral   No growth to date. -- 04-21 @ 18:24    C Diff by PCR Result: Indeterminate (04-22 @ 12:07)    Rapid RVP Result: NotDetec (04-21 @ 13:04)      Radiology:   < from: Xray Chest 1 View- PORTABLE-Urgent (Xray Chest 1 View- PORTABLE-Urgent .) (04.21.22 @ 18:50) >    ACC: [id number] EXAM:  XR CHEST PORTABLE URGENT 1V                          PROCEDURE DATE:  04/21/2022          INTERPRETATION:  Portable chest radiograph    CLINICAL INFORMATION: Line placement    TECHNIQUE:  Portable  AP chest radiograph.    COMPARISON: 1/6/2022 chest radiograph .    FINDINGS:  CATHETERS AND TUBES: LEFT IJ catheter tip in SVC.  Tracheostomy tube in place.      PULMONARY: Increased perihilar diffuse airspace disease and/or effusions   obscuring LEFT diaphragm contour..   No pneumothorax.    HEART/VASCULAR: The heart and mediastinum size and configuration are   within normal limits.    BONES: Visualized osseous structures are intact.    IMPRESSION:   LEFT IJ catheter tip in SVC.  Increased bilateral perihilar diffuse airspace disease and/or effusions   obscuring LEFT diaphragm contour..    --- End of Report ---            HANSEL LOJA MD; Attending Radiologist  This document has been electronically signed. Apr 22 2022  4:14PM    < end of copied text >      Problems  Acute on chronic respiratory failure  Septic shock  E.coli UTI  Anemia    Assessment/Plan:    Neuro: Ativan PRN for agitation/vent compliance  CV: Septic shock, titrated off Levophed gtt. Vasopressors as needed for MAP >65  Resp: Acute on chronic respiratory failure. Full mechanical ventilation. Titrated FiO2 down to 40% at bedside. Maintain full ventilator support, titrate for O2 sat >90%  GI: Tube feeds w/ Glucerna. Protonix for GI Prophylaxis  Renal: RYAN improved w/ hydration. Monitor electrolytes and supplement as needed  ID: E.coli UTI; empiric abx coverage w/ Zosyn. ID following  Heme: Anemia; s/p transfusion 1u PRBC. Trend H/H and transfuse for Hgb >7  Endo: Glycemic control w/ Lispro SS

## 2022-04-23 NOTE — PROGRESS NOTE ADULT - ASSESSMENT
CHAPINCITO GUIDRY 75 f [facility] S 4/21/2022   DR ILIANA KEMP     REVIEW OF SYMPTOMS      Able to give (reliable) ROS  NO     PHYSICAL EXAM    HEENT Unremarkable  atraumatic   RESP Fair air entry EXP prolonged    Harsh breath sound Resp distres mild   CARDIAC S1 S2 No S3     NO JVD    ABDOMEN SOFT BS PRESENT NOT DISTENDED No hepatosplenomegaly   PEDAL EDEMA present No calf tenderness  NO rash       DOA/CC/PROBLEMS poa .  76 f   from [facility]  doa 4/21/2022  cc fever hypotension    PRESENTING PROBLEMS 4/21 4/23/2022   Sepsis   Lacticemia La 2.8   Anemia Hb 6   RYAN Cr 1.3     HOSP COURSE.  4/21 c line int jugul  4/21 ZOSYN   4/21 1 u prbc   4/21 urine c 100k e coli   4/22/2022 weaned off nore  4/22 C diff indet     PMH-PSH .  Pneumonia  HTN, DM, CVA, dementia, chronic respiratory failure s/p trach, PEG, cardiac arrest      COVID/ICU/CODE STATUS.                       COVID  STATUS.           ICU STAY. none  GOC.      BEST PRACTICE ISSUES.                                                  HEAD OF BED ELEVATION. Yes  DVT PROPHYLAXIS.    4/21 hpsc   INFECTION PROPHYLAXIS.   4/22 chlorhexidine 2%   4/21/2022 chlorhexidine .12%    SQUIRES PROPHYLAXIS.  4/21 protonix    4/21 carafate                                                                                      DIET.  4/22 glucerna 1.5 1200 gt               VITALS/PO/IO/VENT/DRIPS.  4/23/2022 afeb 100 90/70     4/23/2022 u 600   4/23/2022 3p ac 18/400/5/.6    4/22/2022 afeb 100 100/50      PROBLEM DATA/ASSESSMENT RECOMMENDATIONS (A/R).    HEMODYNAMICS.   Monitor bp Target MAP 65 (+)    RESP.   Monitor po Target po 90-95%      PMH/PSH PROBLEMS.  Management continued/modified as indicated    VENT MANAGEMENT.   HOB elevation  Target Pplat 30 (-)  Target PO 90-95%  Target pH 730 (+)  Daily spontaneous breathing trials   Daily sedation vacation       INFECTION SOURCE.   VAP   INFECTION A/R.   4/22 C diff indet   4/21 urine c 100k e coli   ID Dr BRITTANY Brantley on case aware  Is on zosyn    INFECTION AUGUST.  W 4/21-4/22-4/23/2022 w 14 - 15-11  UA 4/21/2022 w 11-25   ct 4/22/2022 ct ch 4/21/2022   bl effsns increased in size cw 1/2022 Distended esophagus as before G tube Woody   CXR l gr than r perihilar dis   MICROBIO.  Rvp 4/21 (-)   4/22 C diff indet   4/21 urine c 100k e coli   4/21 blod c (-)    ABIO.  4/21 ZOSYN         SHOCK.  4/21 norepi  4/22/2022 wened off nore     LACTICEMIA.  La 4/21-4/22/2022 la 2.8 - 1.2     COPD.  4/21 albuterol  4/21 spiriva   under control    ANEMIA.   Hb 4/21-4/22-4/23/2022 hb 6 - 8.4 -7.7   Monitro serial    TRANSFUSION  4/21 1 u prbc     HYPONATREMIA.  Na 4/21-4/22-4/23/2022 na 134- 133-131   4/23/2022 nacl added     RYAN.  Cr 4/21-4/22/2022 Cr 1.3 - 1.1      AMS.  ct 4/21 ct head (-)    ANXIETY.  4/23/2022 lorazepam 1.4p  4/21 lorazepam 1.6     SPASMS   4/21 methocarbamol 500.2       TIME SPENT   Over 36 minutes aggregate critical care time spent on encounter; activities included   direct patient care, counseling and/or coordinating care reviewing notes, lab data/ imaging , discussion with multidisciplinary team/ patient  /family and explaining in detail risks, benefits, alternatives  of the recommendations     CHAPINCITO GUIDRY 75 f [facility] S 4/21/2022   DR ILIANA KEMP

## 2022-04-23 NOTE — PROGRESS NOTE ADULT - SUBJECTIVE AND OBJECTIVE BOX
Neurology follow up note    BREA [id number]      Interval History:    Patient resting in bed     Allergies    codeine (Hives)    Intolerances        MEDICATIONS    acetaminophen     Tablet .. 650 milliGRAM(s) Oral every 6 hours PRN  ALBUTerol    90 MICROgram(s) HFA Inhaler 2 Puff(s) Inhalation every 6 hours PRN  chlorhexidine 0.12% Liquid 15 milliLiter(s) Oral Mucosa every 12 hours  chlorhexidine 2% Cloths 1 Application(s) Topical daily  dextrose 5%. 1000 milliLiter(s) IV Continuous <Continuous>  dextrose 5%. 1000 milliLiter(s) IV Continuous <Continuous>  dextrose 50% Injectable 25 Gram(s) IV Push once  dextrose 50% Injectable 12.5 Gram(s) IV Push once  dextrose 50% Injectable 25 Gram(s) IV Push once  dextrose Oral Gel 15 Gram(s) Oral once PRN  folic acid 1 milliGRAM(s) Oral daily  glucagon  Injectable 1 milliGRAM(s) IntraMuscular once  heparin   Injectable 5000 Unit(s) SubCutaneous every 12 hours  insulin lispro (ADMELOG) corrective regimen sliding scale   SubCutaneous every 6 hours  lactobacillus acidophilus 1 Tablet(s) Oral two times a day  LORazepam     Tablet 1 milliGRAM(s) Oral every 4 hours  LORazepam   Injectable 1 milliGRAM(s) IV Push every 6 hours PRN  methocarbamol 500 milliGRAM(s) Oral two times a day  mineral oil/petrolatum Hydrophilic Ointment 1 Application(s) Topical daily  norepinephrine Infusion 0.05 MICROgram(s)/kG/Min IV Continuous <Continuous>  pantoprazole   Suspension 40 milliGRAM(s) Oral daily  piperacillin/tazobactam IVPB.. 3.375 Gram(s) IV Intermittent every 8 hours  polyethylene glycol 3350 17 Gram(s) Oral two times a day  povidone iodine 10% Ointment 1 Application(s) Topical every 12 hours  senna 2 Tablet(s) Oral at bedtime  silver sulfADIAZINE 1% Cream 1 Application(s) Topical daily  simethicone 80 milliGRAM(s) Chew every 6 hours  sodium chloride 0.9% lock flush 10 milliLiter(s) IV Push every 1 hour PRN  sucralfate 1 Gram(s) Oral four times a day  tiotropium 18 MICROgram(s) Capsule 1 Capsule(s) Inhalation daily              Vital Signs Last 24 Hrs  T(C): 37.1 (2022 03:39), Max: 37.1 (2022 12:45)  T(F): 98.8 (2022 03:39), Max: 98.8 (2022 12:45)  HR: 91 (2022 10:52) (88 - 115)  BP: 97/63 (2022 10:00) (70/44 - 128/63)  BP(mean): 75 (2022 10:00) (53 - 86)  RR: 19 (2022 10:00) (12 - 32)  SpO2: 99% (2022 10:52) (94% - 100%)    REVIEW OF SYSTEMS:  Cannot be obtained secondary to the patient being nonverbal.    PHYSICAL EXAMINATION:    HEENT:  Head:  Normocephalic.  Eyes:  No scleral icterus.  Ears:  Unable to evaluate with the patient being nonverbal and unresponsive.  NECK:  Tracheostomy was in place.  RESPIRATORY: Decreased breath sounds bilaterally.  ABDOMEN: Soft and nontender.  EXTREMITIES:  No clubbing or cyanosis was noted.      NEUROLOGIC:  No response to verbal stimuli.  I opened the patient's eyes, no blink to visual threat.  To painful stimuli, slight withdrawal bilateral upper and lower.  Motor:  Bilateral upper extremity was in flexed position.  Bilateral lower extremities were straight.  Upon stimulation, the patient would have subtle shaking.              LABS:  CBC Full  -  ( 2022 06:07 )  WBC Count : 11.35 K/uL  RBC Count : 3.21 M/uL  Hemoglobin : 7.7 g/dL  Hematocrit : 24.9 %  Platelet Count - Automated : 210 K/uL  Mean Cell Volume : 77.6 fl  Mean Cell Hemoglobin : 24.0 pg  Mean Cell Hemoglobin Concentration : 30.9 gm/dL  Auto Neutrophil # : 9.83 K/uL  Auto Lymphocyte # : 0.63 K/uL  Auto Monocyte # : 0.62 K/uL  Auto Eosinophil # : 0.19 K/uL  Auto Basophil # : 0.03 K/uL  Auto Neutrophil % : 86.5 %  Auto Lymphocyte % : 5.6 %  Auto Monocyte % : 5.5 %  Auto Eosinophil % : 1.7 %  Auto Basophil % : 0.3 %    Urinalysis Basic - ( 2022 13:04 )    Color: Yellow / Appearance: Turbid / S.020 / pH: x  Gluc: x / Ketone: Trace  / Bili: Negative / Urobili: Negative mg/dL   Blood: x / Protein: 500 mg/dL / Nitrite: Negative   Leuk Esterase: Moderate / RBC: 6-10 /HPF / WBC 11-25   Sq Epi: x / Non Sq Epi: Few / Bacteria: TNTC          131<L>  |  98  |  40<H>  ----------------------------<  202<H>  3.8   |  27  |  1.20    Ca    8.9      2022 06:07  Phos  2.6       Mg     2.4         TPro  6.8  /  Alb  1.6<L>  /  TBili  0.6  /  DBili  x   /  AST  24  /  ALT  27  /  AlkPhos  157<H>      Hemoglobin A1C:     LIVER FUNCTIONS - ( 2022 06:07 )  Alb: 1.6 g/dL / Pro: 6.8 g/dL / ALK PHOS: 157 U/L / ALT: 27 U/L DA / AST: 24 U/L / GGT: x           Vitamin B12   PT/INR - ( 2022 13:04 )   PT: 16.2 sec;   INR: 1.40 ratio         PTT - ( 2022 13:04 )  PTT:38.6 sec      RADIOLOGY      ANALYSIS AND PLAN:  This is a 76-year-old with altered mental status and history of abnormal movements.  For altered mental status, most likely metabolic encephalopathy secondary to underlying pneumonia-type process along with signs of dehydration from SMA-7.  Antibiotics as needed.  Monitor renal function.  For abnormal movements, the patient has been evaluated multiple times in the past, as per my conversation with spouse, these were deemed nonepileptiform, at present we will refrain from any antiseizure medication and had multiple conversations in the past with spouse, he does not want any.  For history of spasms, continue the patient on muscle relaxants.  For history of diabetes, strict control of blood sugars.  If possible, please maintain a systolic blood pressure above 100 to help maintain cerebral perfusion.    The patient's spouse's name is Marciano, telephone number is [phone].    Greater than 35 minutes of time spent with the patient, planning care, reviewing data, and speaking to multidisciplinary healthcare team with greater than 50% of that time in counseling and care coordination.

## 2022-04-23 NOTE — PROGRESS NOTE ADULT - SUBJECTIVE AND OBJECTIVE BOX
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 Patient unable to contribute to hx due to mental status.    In the ED: wbc 14.15, HGB 6.0, INR 1.40, sodium 134, cr 1.36, lactate 2.8. UA sig for mod LE, large blood, wbc, bacteria. Given Zosyn and ns.  (2022 14:02)      SUBJECTIVE & OBJECTIVE: Pt seen and examined at bedside. peg/trach    PHYSICAL EXAM:  T(C): 37.1 (22 @ 03:39), Max: 37.1 (22 @ 12:45)  HR: 101 (22 @ 08:00) (93 - 115)  BP: 98/52 (22 @ 08:00) (92/50 - 128/63)  RR: 18 (22 @ 08:00) (12 - 32)  SpO2: 99% (22 @ 08:00) (94% - 100%)  Wt(kg): --   GENERAL:trach +  HEAD:  Atraumatic, Normocephalic  NERVOUS SYSTEM:  agitated/restless  CHEST/LUNG: on trach/vent   HEART: Regular rate and rhythm; No murmurs, rubs, or gallops  ABDOMEN: Soft, Nontender, Nondistended; Bowel sounds present  EXTREMITIES:  2+ Peripheral Pulses, No clubbing, cyanosis, or edema        MEDICATIONS  (STANDING):  chlorhexidine 0.12% Liquid 15 milliLiter(s) Oral Mucosa every 12 hours  chlorhexidine 2% Cloths 1 Application(s) Topical daily  dextrose 5%. 1000 milliLiter(s) (100 mL/Hr) IV Continuous <Continuous>  dextrose 5%. 1000 milliLiter(s) (50 mL/Hr) IV Continuous <Continuous>  dextrose 50% Injectable 25 Gram(s) IV Push once  dextrose 50% Injectable 12.5 Gram(s) IV Push once  dextrose 50% Injectable 25 Gram(s) IV Push once  folic acid 1 milliGRAM(s) Oral daily  glucagon  Injectable 1 milliGRAM(s) IntraMuscular once  heparin   Injectable 5000 Unit(s) SubCutaneous every 12 hours  insulin lispro (ADMELOG) corrective regimen sliding scale   SubCutaneous every 6 hours  lactobacillus acidophilus 1 Tablet(s) Oral two times a day  LORazepam     Tablet 1 milliGRAM(s) Oral every 4 hours  methocarbamol 500 milliGRAM(s) Oral two times a day  mineral oil/petrolatum Hydrophilic Ointment 1 Application(s) Topical daily  norepinephrine Infusion 0.05 MICROgram(s)/kG/Min (2.34 mL/Hr) IV Continuous <Continuous>  pantoprazole   Suspension 40 milliGRAM(s) Oral daily  piperacillin/tazobactam IVPB.. 3.375 Gram(s) IV Intermittent every 8 hours  polyethylene glycol 3350 17 Gram(s) Oral two times a day  povidone iodine 10% Ointment 1 Application(s) Topical every 12 hours  senna 2 Tablet(s) Oral at bedtime  silver sulfADIAZINE 1% Cream 1 Application(s) Topical daily  simethicone 80 milliGRAM(s) Chew every 6 hours  sucralfate 1 Gram(s) Oral four times a day  tiotropium 18 MICROgram(s) Capsule 1 Capsule(s) Inhalation daily    MEDICATIONS  (PRN):  acetaminophen     Tablet .. 650 milliGRAM(s) Oral every 6 hours PRN Temp greater or equal to 38C (100.4F)  ALBUTerol    90 MICROgram(s) HFA Inhaler 2 Puff(s) Inhalation every 6 hours PRN Shortness of Breath and/or Wheezing  dextrose Oral Gel 15 Gram(s) Oral once PRN Blood Glucose LESS THAN 70 milliGRAM(s)/deciliter  LORazepam   Injectable 1 milliGRAM(s) IV Push every 6 hours PRN Anxiety  sodium chloride 0.9% lock flush 10 milliLiter(s) IV Push every 1 hour PRN Pre/post blood products, medications, blood draw, and to maintain line patency      LABS:                        7.7    11.35 )-----------( 210      ( 2022 06:07 )             24.9     04-23    131<L>  |  98  |  40<H>  ----------------------------<  202<H>  3.8   |  27  |  1.20    Ca    8.9      2022 06:07  Phos  2.6       Mg     2.4         TPro  6.8  /  Alb  1.6<L>  /  TBili  0.6  /  DBili  x   /  AST  24  /  ALT  27  /  AlkPhos  157<H>      PT/INR - ( 2022 13:04 )   PT: 16.2 sec;   INR: 1.40 ratio         PTT - ( 2022 13:04 )  PTT:38.6 sec  Urinalysis Basic - ( 2022 13:04 )    Color: Yellow / Appearance: Turbid / S.020 / pH: x  Gluc: x / Ketone: Trace  / Bili: Negative / Urobili: Negative mg/dL   Blood: x / Protein: 500 mg/dL / Nitrite: Negative   Leuk Esterase: Moderate / RBC: 6-10 /HPF / WBC 11-25   Sq Epi: x / Non Sq Epi: Few / Bacteria: TNTC      Magnesium, Serum: 2.4 mg/dL ( @ 06:07)    CAPILLARY BLOOD GLUCOSE      POCT Blood Glucose.: 188 mg/dL (2022 05:14)  POCT Blood Glucose.: 177 mg/dL (2022 23:06)  POCT Blood Glucose.: 197 mg/dL (2022 17:44)  POCT Blood Glucose.: 133 mg/dL (2022 11:31)      CAPILLARY BLOOD GLUCOSE      POCT Blood Glucose.: 188 mg/dL (2022 05:14)  POCT Blood Glucose.: 177 mg/dL (2022 23:06)  POCT Blood Glucose.: 197 mg/dL (2022 17:44)  POCT Blood Glucose.: 133 mg/dL (2022 11:31)    CAPILLARY BLOOD GLUCOSE      POCT Blood Glucose.: 188 mg/dL (2022 05:14)            RECENT CULTURES:      RADIOLOGY & ADDITIONAL TESTS:                        DVT/GI ppx  Discussed with pt @ bedside

## 2022-04-23 NOTE — PROGRESS NOTE ADULT - SUBJECTIVE AND OBJECTIVE BOX
[facility], Division of Infectious Diseases  [facility]. Patel, [facility], [facility]  [phone]    Name: BREA BECKHAM  Age: 76y  Gender: Female  MRN: [id number]    Interval History:  Patient seen and examined at bedside in [facility]U  No acute overnight events.   Notes reviewed    Antibiotics:  piperacillin/tazobactam IVPB.. 3.375 Gram(s) IV Intermittent every 8 hours      Medications:  acetaminophen     Tablet .. 650 milliGRAM(s) Oral every 6 hours PRN  ALBUTerol    90 MICROgram(s) HFA Inhaler 2 Puff(s) Inhalation every 6 hours PRN  chlorhexidine 0.12% Liquid 15 milliLiter(s) Oral Mucosa every 12 hours  chlorhexidine 2% Cloths 1 Application(s) Topical daily  dextrose 5%. 1000 milliLiter(s) IV Continuous <Continuous>  dextrose 5%. 1000 milliLiter(s) IV Continuous <Continuous>  dextrose 50% Injectable 25 Gram(s) IV Push once  dextrose 50% Injectable 12.5 Gram(s) IV Push once  dextrose 50% Injectable 25 Gram(s) IV Push once  dextrose Oral Gel 15 Gram(s) Oral once PRN  folic acid 1 milliGRAM(s) Oral daily  glucagon  Injectable 1 milliGRAM(s) IntraMuscular once  heparin   Injectable 5000 Unit(s) SubCutaneous every 12 hours  insulin lispro (ADMELOG) corrective regimen sliding scale   SubCutaneous every 6 hours  lactobacillus acidophilus 1 Tablet(s) Oral two times a day  LORazepam     Tablet 1 milliGRAM(s) Oral every 4 hours  LORazepam   Injectable 1 milliGRAM(s) IV Push every 6 hours PRN  methocarbamol 500 milliGRAM(s) Oral two times a day  mineral oil/petrolatum Hydrophilic Ointment 1 Application(s) Topical daily  norepinephrine Infusion 0.05 MICROgram(s)/kG/Min IV Continuous <Continuous>  pantoprazole   Suspension 40 milliGRAM(s) Oral daily  piperacillin/tazobactam IVPB.. 3.375 Gram(s) IV Intermittent every 8 hours  polyethylene glycol 3350 17 Gram(s) Oral two times a day  povidone iodine 10% Ointment 1 Application(s) Topical every 12 hours  senna 2 Tablet(s) Oral at bedtime  silver sulfADIAZINE 1% Cream 1 Application(s) Topical daily  simethicone 80 milliGRAM(s) Chew every 6 hours  sodium chloride 0.9% lock flush 10 milliLiter(s) IV Push every 1 hour PRN  sucralfate 1 Gram(s) Oral four times a day  tiotropium 18 MICROgram(s) Capsule 1 Capsule(s) Inhalation daily      Review of Systems:  unable to obtain    Allergies: codeine (Hives)    For details regarding the patient's past medical history, social history, family history, and other miscellaneous elements, please refer the initial infectious diseases consultation and/or the admitting history and physical examination for this admission.    Objective:  Vitals:   T(C): 37.1 (04-23-22 @ 03:39), Max: 37.1 (04-23-22 @ 03:39)  HR: 84 (04-23-22 @ 15:09) (84 - 108)  BP: 80/56 (04-23-22 @ 14:00) (70/44 - 122/58)  RR: 22 (04-23-22 @ 14:00) (12 - 32)  SpO2: 99% (04-23-22 @ 15:09) (97% - 100%)    Physical Examination:  General: no acute distress, nontoxic appearing  HEENT: anicteric  Cardio: S1, S2 present, tachycardic  Resp: tracheostomy, on vent  Abd: distended  Ext: edema  Skin: warm, dry, no visible rash       Laboratory Studies:  CBC:                       7.7    11.35 )-----------( 210      ( 23 Apr 2022 06:07 )             24.9     CMP: 04-23    131<L>  |  98  |  40<H>  ----------------------------<  202<H>  3.8   |  27  |  1.20    Ca    8.9      23 Apr 2022 06:07  Phos  2.6     04-23  Mg     2.4     04-23    TPro  6.8  /  Alb  1.6<L>  /  TBili  0.6  /  DBili  x   /  AST  24  /  ALT  27  /  AlkPhos  157<H>  04-23    LIVER FUNCTIONS - ( 23 Apr 2022 06:07 )  Alb: 1.6 g/dL / Pro: 6.8 g/dL / ALK PHOS: 157 U/L / ALT: 27 U/L DA / AST: 24 U/L / GGT: x               Microbiology: reviewed    Culture - Urine (collected 04-21-22 @ 18:36)  Source: Clean Catch Clean Catch (Midstream)  Preliminary Report (04-23-22 @ 07:38):    >100,000 CFU/ml Escherichia coli    Culture - Blood (collected 04-21-22 @ 18:24)  Source: .Blood Blood-Peripheral  Preliminary Report (04-22-22 @ 19:01):    No growth to date.    Culture - Blood (collected 04-21-22 @ 18:24)  Source: .Blood Blood-Peripheral  Preliminary Report (04-22-22 @ 19:01):    No growth to date.        Radiology: reviewed

## 2022-04-23 NOTE — PROGRESS NOTE ADULT - SUBJECTIVE AND OBJECTIVE BOX
Date/Time Patient Seen:  		  Referring MD:   Data Reviewed	       Patient is a 76y old  Female who presents with a chief complaint of Anemia (22 Apr 2022 15:38)      Subjective/HPI     PAST MEDICAL & SURGICAL HISTORY:  Dementia of frontal lobe type    Aphasic stroke    Diabetes mellitus    Respiratory failure    Hypertension    GERD (gastroesophageal reflux disease)    Constipation    Respiratory failure    CVA (cerebral vascular accident)    HTN (hypertension)    DM (diabetes mellitus)    Advanced dementia    COVID-19 virus detected    Quadriplegia    Pneumonia    Type II diabetes mellitus    Hx of appendectomy    Gastrostomy in place    Tracheostomy in place    Tracheostomy tube present    Feeding by G-tube          Medication list         MEDICATIONS  (STANDING):  chlorhexidine 0.12% Liquid 15 milliLiter(s) Oral Mucosa every 12 hours  chlorhexidine 2% Cloths 1 Application(s) Topical daily  dextrose 5%. 1000 milliLiter(s) (100 mL/Hr) IV Continuous <Continuous>  dextrose 5%. 1000 milliLiter(s) (50 mL/Hr) IV Continuous <Continuous>  dextrose 50% Injectable 25 Gram(s) IV Push once  dextrose 50% Injectable 12.5 Gram(s) IV Push once  dextrose 50% Injectable 25 Gram(s) IV Push once  folic acid 1 milliGRAM(s) Oral daily  glucagon  Injectable 1 milliGRAM(s) IntraMuscular once  heparin   Injectable 5000 Unit(s) SubCutaneous every 12 hours  insulin lispro (ADMELOG) corrective regimen sliding scale   SubCutaneous every 6 hours  lactobacillus acidophilus 1 Tablet(s) Oral two times a day  LORazepam     Tablet 1 milliGRAM(s) Oral every 4 hours  methocarbamol 500 milliGRAM(s) Oral two times a day  mineral oil/petrolatum Hydrophilic Ointment 1 Application(s) Topical daily  norepinephrine Infusion 0.05 MICROgram(s)/kG/Min (2.34 mL/Hr) IV Continuous <Continuous>  pantoprazole   Suspension 40 milliGRAM(s) Oral daily  piperacillin/tazobactam IVPB.. 3.375 Gram(s) IV Intermittent every 8 hours  polyethylene glycol 3350 17 Gram(s) Oral two times a day  povidone iodine 10% Ointment 1 Application(s) Topical every 12 hours  senna 2 Tablet(s) Oral at bedtime  silver sulfADIAZINE 1% Cream 1 Application(s) Topical daily  simethicone 80 milliGRAM(s) Chew every 6 hours  sucralfate 1 Gram(s) Oral four times a day  tiotropium 18 MICROgram(s) Capsule 1 Capsule(s) Inhalation daily    MEDICATIONS  (PRN):  acetaminophen     Tablet .. 650 milliGRAM(s) Oral every 6 hours PRN Temp greater or equal to 38C (100.4F)  ALBUTerol    90 MICROgram(s) HFA Inhaler 2 Puff(s) Inhalation every 6 hours PRN Shortness of Breath and/or Wheezing  dextrose Oral Gel 15 Gram(s) Oral once PRN Blood Glucose LESS THAN 70 milliGRAM(s)/deciliter  sodium chloride 0.9% lock flush 10 milliLiter(s) IV Push every 1 hour PRN Pre/post blood products, medications, blood draw, and to maintain line patency         Vitals log        ICU Vital Signs Last 24 Hrs  T(C): 37.1 (23 Apr 2022 03:39), Max: 37.1 (22 Apr 2022 12:45)  T(F): 98.8 (23 Apr 2022 03:39), Max: 98.8 (22 Apr 2022 12:45)  HR: 108 (23 Apr 2022 05:25) (93 - 115)  BP: 111/64 (23 Apr 2022 02:00) (92/50 - 128/63)  BP(mean): 77 (23 Apr 2022 02:00) (59 - 86)  ABP: 121/60 (23 Apr 2022 02:00) (96/42 - 143/64)  ABP(mean): 83 (23 Apr 2022 02:00) (62 - 99)  RR: 27 (23 Apr 2022 02:00) (13 - 32)  SpO2: 100% (23 Apr 2022 05:25) (94% - 100%)       Mode: AC/ CMV (Assist Control/ Continuous Mandatory Ventilation)  RR (machine): 18  TV (machine): 400  FiO2: 60  PEEP: 5  ITime: 1  MAP: 15  PIP: 31      Input and Output:  I&O's Detail    21 Apr 2022 07:01  -  22 Apr 2022 07:00  --------------------------------------------------------  IN:    IV PiggyBack: 100 mL    Norepinephrine: 21.5 mL    PRBCs (Packed Red Blood Cells): 374 mL    Sodium Chloride 0.9% Bolus: 1550 mL  Total IN: 2045.5 mL    OUT:    Indwelling Catheter - Urethral (mL): 500 mL  Total OUT: 500 mL    Total NET: 1545.5 mL      22 Apr 2022 07:01  -  23 Apr 2022 06:25  --------------------------------------------------------  IN:    Enteral Tube Flush: 125 mL    Free Water: 400 mL    IV PiggyBack: 100 mL    Norepinephrine: 4.3 mL  Total IN: 629.3 mL    OUT:    Indwelling Catheter - Urethral (mL): 950 mL    Rectal Tube (mL): 200 mL  Total OUT: 1150 mL    Total NET: -520.7 mL          Lab Data                        7.7    11.35 )-----------( 210      ( 23 Apr 2022 06:07 )             24.9     04-22    133<L>  |  99  |  41<H>  ----------------------------<  125<H>  3.9   |  27  |  1.10    Ca    8.9      22 Apr 2022 06:58  Phos  2.7     04-22  Mg     2.4     04-22    TPro  6.9  /  Alb  1.7<L>  /  TBili  1.0  /  DBili  x   /  AST  32  /  ALT  27  /  AlkPhos  125<H>  04-22            Review of Systems	      Objective     Physical Examination    heart s1s2  lung dec BS  abd soft  head nc      Pertinent Lab findings & Imaging      Annalise:  NO   Adequate UO     I&O's Detail    21 Apr 2022 07:01  -  22 Apr 2022 07:00  --------------------------------------------------------  IN:    IV PiggyBack: 100 mL    Norepinephrine: 21.5 mL    PRBCs (Packed Red Blood Cells): 374 mL    Sodium Chloride 0.9% Bolus: 1550 mL  Total IN: 2045.5 mL    OUT:    Indwelling Catheter - Urethral (mL): 500 mL  Total OUT: 500 mL    Total NET: 1545.5 mL      22 Apr 2022 07:01  -  23 Apr 2022 06:25  --------------------------------------------------------  IN:    Enteral Tube Flush: 125 mL    Free Water: 400 mL    IV PiggyBack: 100 mL    Norepinephrine: 4.3 mL  Total IN: 629.3 mL    OUT:    Indwelling Catheter - Urethral (mL): 950 mL    Rectal Tube (mL): 200 mL  Total OUT: 1150 mL    Total NET: -520.7 mL               Discussed with:     Cultures:	        Radiology

## 2022-04-23 NOTE — PROGRESS NOTE ADULT - ASSESSMENT
75 y/o F with PMH of HTN, DM type 2, CVA, recent Cardiac Arrest, Chronic Respiratory Failure, Vent Dependant s/p trach & PEG, Anemia with GI blood loss, and Dementia who was sent from [facility] facility for acute respiratory distress, fevers, hypotension.    sepsis  hypotension  OP  OA  chr resp failure  cva  hx of card arrest  DM    agitation issues - on benzo rx regimen    emp ABX  cx - biomarkers  ct imaging reviewed  labs reviewed  assist with needs    with HCP   pt is full code  old records reviewed  GOC documented

## 2022-04-23 NOTE — PROGRESS NOTE ADULT - SUBJECTIVE AND OBJECTIVE BOX
Chief Complaint: Respiratory failure    Interval Events: No events overnight.    Review of Systems:  Unable to obtain    Physical Exam:  Vitals:        Vital Signs Last 24 Hrs  T(C): 37.1 (23 Apr 2022 03:39), Max: 37.1 (22 Apr 2022 12:45)  T(F): 98.8 (23 Apr 2022 03:39), Max: 98.8 (22 Apr 2022 12:45)  HR: 88 (23 Apr 2022 10:00) (88 - 115)  BP: 97/63 (23 Apr 2022 10:00) (70/44 - 128/63)  BP(mean): 75 (23 Apr 2022 10:00) (53 - 86)  RR: 19 (23 Apr 2022 10:00) (12 - 32)  SpO2: 100% (23 Apr 2022 10:00) (94% - 100%)  General: NAD  HEENT: Trach  Neck: No JVD, no carotid bruit  Lungs: Coarse bilaterally  CV: RRR, nl S1/S2, no M/R/G  Abdomen: S/NT/ND, +BS  Extremities: No LE edema, no cyanosis  Neuro: AAOx0  Skin: No rash    Labs:                        7.7    11.35 )-----------( 210      ( 23 Apr 2022 06:07 )             24.9     04-23    131<L>  |  98  |  40<H>  ----------------------------<  202<H>  3.8   |  27  |  1.20    Ca    8.9      23 Apr 2022 06:07  Phos  2.6     04-23  Mg     2.4     04-23    TPro  6.8  /  Alb  1.6<L>  /  TBili  0.6  /  DBili  x   /  AST  24  /  ALT  27  /  AlkPhos  157<H>  04-23        PT/INR - ( 21 Apr 2022 13:04 )   PT: 16.2 sec;   INR: 1.40 ratio         PTT - ( 21 Apr 2022 13:04 )  PTT:38.6 sec    Telemetry: Sinus rhythm

## 2022-04-23 NOTE — PROGRESS NOTE ADULT - ASSESSMENT
The patient is a 76 year old female with a history of HTN, DM, CVA, dementia, chronic respiratory failure s/p trach, PEG, cardiac arrest, anemia who presented with respiratory distress, fevers, hypotension.    Plan:  - Echo 9/21 with normal LV systolic function, mod pulm HTN  - CT chest with bilateral infiltrates and pleural effusions  - Weaned off of norepinephrine  - If BP trends down, start midodrine  - Received IV fluids  - Hemoglobin initially low at 6 - transfused  - On zosyn  - Mechanical ventilation  - Overall prognosis remains poor

## 2022-04-23 NOTE — PROGRESS NOTE ADULT - ASSESSMENT
77yo F with PMH of HTN, DM type 2, CVA, hx of Cardiac Arrest, Chronic Respiratory Failure, Vent Dependant s/p trach & PEG, Anemia with GI blood loss, and Dementia who was sent from [facility] facility for acute respiratory distress, fevers, hypotension a/w acute on chronic hypoxic respiratory failure, septic shock (evolving on admission) likely due to gram negative PNA and/or CAUTI, also with severe anemia.    #Septic Shock  likely gram negative PNA and/or CAUTI  CT chest/abd/pelvis more consistent with PNA than pyelonephritis  Currently on broad spectrum abx with Zosyn which would likely cover both  last sputum Cx from 12/2021 had CRE Pseudomonas and Serratia which were sensitive to zosyn (the Stenotrophomonas may have been resistant)  f/u blood, urine, and sputum cultures  ID ([facility]) consulted, recs appreciated  taper down levophed as able    #Acute on chronic hypoxic respiratory failure  likely due to pleural effusions and suspected gram-negative PNA  titrating down FiO2 on the vent as able  c/w zosyn for PNA  c/w inhalers per pulm (Jaime), recs appreciated    #Severe Anemia  Hematology (Chairez), recs appreciated  Work-up suggestive of anemia of chronic disease  pt started on folic acid per heme  Supportive care  Received 1 unit PRBC with good response (Hgb 6.0 ->8.4)  FOBT - negative    #RYAN   likely pre-renal, but may have some degree of ATN from septic shock  monitor BMP  Avoid nephrotoxic meds     #Diabetes  Diabetes type II (on home insulin)  Insulin Corrective Scale  Finger sticks per routine (have been in the low 100s);   Hypoglycemia protocol    #Quadriplegia:  c/w nursing care for all ADLs    #COPD:  c/w inhalers   pulm (Jaime), recs appreciated    #GERD:  c/w carafate and PPI     #VTE ppx:  c/w HSQ

## 2022-04-23 NOTE — PROGRESS NOTE ADULT - ASSESSMENT
75 y/o F with PMH of HTN, DM type 2, CVA, recent Cardiac Arrest, Chronic Respiratory Failure, Vent Dependant s/p trach & PEG, Anemia with GI blood loss, and Dementia who was sent from [facility] facility for acute respiratory distress, fevers, hypotension. Patient unable to contribute to hx due to mental status.  In the ED: wbc 14.15, HGB 6.0, INR 1.40, sodium 134, cr 1.36, lactate 2.8. UA sig for mod LE, large blood, wbc, bacteria. Given Zosyn and ns.     RECOMMENDATIONS    Cx from 4/21 reviewed:  f/u blood cx-NGTD  f/u urine culture- E.Coli  f/u sputum culture- pending    Diarrhea:  C. diff indeterminate can repeat if persistent diarrhea    C/w zosyn for now    Infectious Diseases will continue to follow. Please call with any questions.   Andressa Brantley M.D.  [facility], Division of Infectious Diseases [phone]

## 2022-04-23 NOTE — PROGRESS NOTE ADULT - SUBJECTIVE AND OBJECTIVE BOX
BRE[facility] BECKHAM    [facility]    Allergies    codeine (Hives)    Intolerances        PAST MEDICAL & SURGICAL HISTORY:  Dementia of frontal lobe type    Aphasic stroke    Diabetes mellitus    Respiratory failure    Hypertension    GERD (gastroesophageal reflux disease)    Constipation    Respiratory failure    CVA (cerebral vascular accident)    HTN (hypertension)    DM (diabetes mellitus)    Advanced dementia    COVID-19 virus detected    Quadriplegia    Pneumonia    Type II diabetes mellitus    Hx of appendectomy    Gastrostomy in place    Tracheostomy in place    Tracheostomy tube present    Feeding by G-tube        FAMILY HISTORY:  No pertinent family history in first degree relatives        Home Medications:  albuterol 90 mcg/inh inhalation aerosol with adapter: 2  inhaled every 6 hours (2022 14:01)  Bacid (LAC) oral tablet: 2 tab(s) by gastrostomy tube once a day (2022 13:45)  Basaglar KwikPen 100 units/mL subcutaneous solution: 36 unit(s) subcutaneous once a day (at bedtime) (2022 14:01)  Betadine 10% topical swab: cleanse right and left great toes (2022 14:01)  Carafate 1 g/10 mL oral suspension: 10 milliliter(s) by gastrostomy tube 4 times a day (before meals and at bedtime) for 14 days (Started 21) (2022 13:45)  chlorhexidine 0.12% mucous membrane liquid: 15 milliliter(s) mucous membrane 2 times a day (2022 13:45)  Eucerin topical cream: Apply topically to affected area once a day bilateral feet (2022 14:01)  ipratropium-albuterol 0.5 mg-2.5 mg/3 mL inhalation solution: 3 milliliter(s) inhaled 4 times a day (2022 13:45)  LORazepam 1 mg oral tablet: 1 tab(s) by gastrostomy tube every 4 hours (2022 14:01)  methocarbamol 500 mg oral tablet: 1 tab(s) by gastrostomy tube 2 times a day (2022 14:01)  pantoprazole: 20 milliliter(s) by gastrostomy tube 2 times a day (2022 14:01)  polyethylene glycol 3350 oral powder for reconstitution: 17 gram(s) by gastrostomy tube every 12 hours (2022 14:01)  senna 8.6 mg oral tablet: 3 tab(s) by gastrostomy tube once a day (at bedtime) (2022 13:45)  silver sulfADIAZINE 1% topical cream: Apply topically to affected area once a day to sacrum (2022 14:01)  simethicone 80 mg oral tablet, chewable: 1 tab(s) by gastrostomy tube every 6 hours (2022 14:01)  Tylenol 325 mg oral tablet: 2 tab(s) by gastrostomy tube once a day; 60 minutes prior to dressing change  (2022 13:45)  Tylenol 325 mg oral tablet: 2 tab(s) by gastrostomy tube every 6 hours, As Needed (2022 14:01)      MEDICATIONS  (STANDING):  chlorhexidine 0.12% Liquid 15 milliLiter(s) Oral Mucosa every 12 hours  chlorhexidine 2% Cloths 1 Application(s) Topical daily  dextrose 5%. 1000 milliLiter(s) (100 mL/Hr) IV Continuous <Continuous>  dextrose 5%. 1000 milliLiter(s) (50 mL/Hr) IV Continuous <Continuous>  dextrose 50% Injectable 25 Gram(s) IV Push once  dextrose 50% Injectable 12.5 Gram(s) IV Push once  dextrose 50% Injectable 25 Gram(s) IV Push once  folic acid 1 milliGRAM(s) Oral daily  glucagon  Injectable 1 milliGRAM(s) IntraMuscular once  heparin   Injectable 5000 Unit(s) SubCutaneous every 12 hours  insulin lispro (ADMELOG) corrective regimen sliding scale   SubCutaneous every 6 hours  lactobacillus acidophilus 1 Tablet(s) Oral two times a day  LORazepam     Tablet 1 milliGRAM(s) Oral every 4 hours  methocarbamol 500 milliGRAM(s) Oral two times a day  mineral oil/petrolatum Hydrophilic Ointment 1 Application(s) Topical daily  norepinephrine Infusion 0.05 MICROgram(s)/kG/Min (2.34 mL/Hr) IV Continuous <Continuous>  pantoprazole   Suspension 40 milliGRAM(s) Oral daily  piperacillin/tazobactam IVPB.. 3.375 Gram(s) IV Intermittent every 8 hours  polyethylene glycol 3350 17 Gram(s) Oral two times a day  povidone iodine 10% Ointment 1 Application(s) Topical every 12 hours  senna 2 Tablet(s) Oral at bedtime  silver sulfADIAZINE 1% Cream 1 Application(s) Topical daily  simethicone 80 milliGRAM(s) Chew every 6 hours  sucralfate 1 Gram(s) Oral four times a day  tiotropium 18 MICROgram(s) Capsule 1 Capsule(s) Inhalation daily    MEDICATIONS  (PRN):  acetaminophen     Tablet .. 650 milliGRAM(s) Oral every 6 hours PRN Temp greater or equal to 38C (100.4F)  ALBUTerol    90 MICROgram(s) HFA Inhaler 2 Puff(s) Inhalation every 6 hours PRN Shortness of Breath and/or Wheezing  dextrose Oral Gel 15 Gram(s) Oral once PRN Blood Glucose LESS THAN 70 milliGRAM(s)/deciliter  LORazepam   Injectable 1 milliGRAM(s) IV Push every 6 hours PRN Anxiety  sodium chloride 0.9% lock flush 10 milliLiter(s) IV Push every 1 hour PRN Pre/post blood products, medications, blood draw, and to maintain line patency      Diet, NPO with Tube Feed:   Tube Feeding Modality: Gastrostomy  Glucerna 1.5 Horacio  Total Volume for 24 Hours (mL): 1200  Continuous  Starting Tube Feed Rate mL per Hour: 20  Increase Tube Feed Rate by (mL): 10     Every 6 hours  Until Goal Tube Feed Rate (mL per Hour): 60  Tube Feed Duration (in Hours): 20  Tube Feed Start Time: 00:00  Free Water Flush  Pump   Rate (mL per Hour): 30   Frequency: Every Hour    Duration (Hours): 24 (22 @ 12:49) [Active]          Vital Signs Last 24 Hrs  T(C): 37.1 (2022 03:39), Max: 37.1 (2022 12:45)  T(F): 98.8 (2022 03:39), Max: 98.8 (2022 12:45)  HR: 101 (2022 08:00) (93 - 115)  BP: 98/52 (2022 08:00) (92/50 - 128/63)  BP(mean): 63 (2022 08:00) (59 - 86)  RR: 18 (2022 08:00) (12 - 32)  SpO2: 99% (2022 08:00) (94% - 100%)      22 @ 07:01  -  22 @ 07:00  --------------------------------------------------------  IN: 629.3 mL / OUT: 1150 mL / NET: -520.7 mL        Mode: AC/ CMV (Assist Control/ Continuous Mandatory Ventilation), RR (machine): 18, TV (machine): 400, FiO2: 60, PEEP: 5, ITime: 1, MAP: 14, PIP: 30      LABS:                        7.7    11.35 )-----------( 210      ( 2022 06:07 )             24.9     04-23    131<L>  |  98  |  40<H>  ----------------------------<  202<H>  3.8   |  27  |  1.20    Ca    8.9      2022 06:07  Phos  2.6       Mg     2.4         TPro  6.8  /  Alb  1.6<L>  /  TBili  0.6  /  DBili  x   /  AST  24  /  ALT  27  /  AlkPhos  157<H>  23    PT/INR - ( 2022 13:04 )   PT: 16.2 sec;   INR: 1.40 ratio         PTT - ( 2022 13:04 )  PTT:38.6 sec  Urinalysis Basic - ( 2022 13:04 )    Color: Yellow / Appearance: Turbid / S.020 / pH: x  Gluc: x / Ketone: Trace  / Bili: Negative / Urobili: Negative mg/dL   Blood: x / Protein: 500 mg/dL / Nitrite: Negative   Leuk Esterase: Moderate / RBC: 6-10 /HPF / WBC 11-25   Sq Epi: x / Non Sq Epi: Few / Bacteria: TNTC            WBC:  WBC Count: 11.35 K/uL ( @ 06:07)  WBC Count: 15.47 K/uL ( @ 06:58)  WBC Count: 14.93 K/uL ( @ 22:35)  WBC Count: 14.15 K/uL ( @ 13:04)      MICROBIOLOGY:  RECENT CULTURES:   Clean Catch Clean Catch (Midstream) XXXX XXXX   >100,000 CFU/ml Escherichia coli     .Blood Blood-Peripheral XXXX XXXX   No growth to date.                PT/INR - ( 2022 13:04 )   PT: 16.2 sec;   INR: 1.40 ratio         PTT - ( 2022 13:04 )  PTT:38.6 sec    Sodium:  Sodium, Serum: 131 mmol/L ( @ 06:07)  Sodium, Serum: 133 mmol/L ( @ 06:58)  Sodium, Serum: 133 mmol/L ( @ 22:35)  Sodium, Serum: 134 mmol/L ( @ 13:04)      1.20 mg/dL  @ 06:07  1.10 mg/dL  @ 06:58  1.29 mg/dL  22:35  1.36 mg/dL  @ 13:04      Hemoglobin:  Hemoglobin: 7.7 g/dL ( @ 06:07)  Hemoglobin: 8.4 g/dL ( @ 06:58)  Hemoglobin: 8.3 g/dL ( @ 22:35)  Hemoglobin: 6.0 g/dL ( @ 13:04)      Platelets: Platelet Count - Automated: 210 K/uL ( @ 06:07)  Platelet Count - Automated: 188 K/uL ( @ 06:58)  Platelet Count - Automated: 170 K/uL ( @ 22:35)  Platelet Count - Automated: 176 K/uL ( @ 13:04)      LIVER FUNCTIONS - ( 2022 06:07 )  Alb: 1.6 g/dL / Pro: 6.8 g/dL / ALK PHOS: 157 U/L / ALT: 27 U/L DA / AST: 24 U/L / GGT: x             Urinalysis Basic - ( 2022 13:04 )    Color: Yellow / Appearance: Turbid / S.020 / pH: x  Gluc: x / Ketone: Trace  / Bili: Negative / Urobili: Negative mg/dL   Blood: x / Protein: 500 mg/dL / Nitrite: Negative   Leuk Esterase: Moderate / RBC: 6-10 /HPF / WBC 11-25   Sq Epi: x / Non Sq Epi: Few / Bacteria: TNTC        RADIOLOGY & ADDITIONAL STUDIES:      MICROBIOLOGY:  RECENT CULTURES:   Clean Catch Clean Catch (Midstream) XXXX XXXX   >100,000 CFU/ml Escherichia coli     .Blood Blood-Peripheral XXXX XXXX   No growth to date.

## 2022-04-24 LAB
-  AMIKACIN: SIGNIFICANT CHANGE UP
-  AMOXICILLIN/CLAVULANIC ACID: SIGNIFICANT CHANGE UP
-  AMPICILLIN/SULBACTAM: SIGNIFICANT CHANGE UP
-  AMPICILLIN: SIGNIFICANT CHANGE UP
-  AZTREONAM: SIGNIFICANT CHANGE UP
-  CEFAZOLIN: SIGNIFICANT CHANGE UP
-  CEFEPIME: SIGNIFICANT CHANGE UP
-  CEFTRIAXONE: SIGNIFICANT CHANGE UP
-  CIPROFLOXACIN: SIGNIFICANT CHANGE UP
-  ERTAPENEM: SIGNIFICANT CHANGE UP
-  GENTAMICIN: SIGNIFICANT CHANGE UP
-  IMIPENEM: SIGNIFICANT CHANGE UP
-  LEVOFLOXACIN: SIGNIFICANT CHANGE UP
-  MEROPENEM: SIGNIFICANT CHANGE UP
-  NITROFURANTOIN: SIGNIFICANT CHANGE UP
-  PIPERACILLIN/TAZOBACTAM: SIGNIFICANT CHANGE UP
-  TIGECYCLINE: SIGNIFICANT CHANGE UP
-  TOBRAMYCIN: SIGNIFICANT CHANGE UP
-  TRIMETHOPRIM/SULFAMETHOXAZOLE: SIGNIFICANT CHANGE UP
ALBUMIN SERPL ELPH-MCNC: 1.7 G/DL — LOW (ref 3.3–5)
ALP SERPL-CCNC: 197 U/L — HIGH (ref 30–120)
ALT FLD-CCNC: 21 U/L DA — SIGNIFICANT CHANGE UP (ref 10–60)
ANION GAP SERPL CALC-SCNC: 6 MMOL/L — SIGNIFICANT CHANGE UP (ref 5–17)
AST SERPL-CCNC: 20 U/L — SIGNIFICANT CHANGE UP (ref 10–40)
BILIRUB SERPL-MCNC: 0.5 MG/DL — SIGNIFICANT CHANGE UP (ref 0.2–1.2)
BUN SERPL-MCNC: 43 MG/DL — HIGH (ref 7–23)
CALCIUM SERPL-MCNC: 8.8 MG/DL — SIGNIFICANT CHANGE UP (ref 8.4–10.5)
CHLORIDE SERPL-SCNC: 99 MMOL/L — SIGNIFICANT CHANGE UP (ref 96–108)
CO2 SERPL-SCNC: 28 MMOL/L — SIGNIFICANT CHANGE UP (ref 22–31)
CREAT SERPL-MCNC: 1.15 MG/DL — SIGNIFICANT CHANGE UP (ref 0.5–1.3)
CULTURE RESULTS: SIGNIFICANT CHANGE UP
EGFR: 49 ML/MIN/1.73M2 — LOW
GLUCOSE BLDC GLUCOMTR-MCNC: 205 MG/DL — HIGH (ref 70–99)
GLUCOSE BLDC GLUCOMTR-MCNC: 205 MG/DL — HIGH (ref 70–99)
GLUCOSE BLDC GLUCOMTR-MCNC: 209 MG/DL — HIGH (ref 70–99)
GLUCOSE BLDC GLUCOMTR-MCNC: 213 MG/DL — HIGH (ref 70–99)
GLUCOSE SERPL-MCNC: 208 MG/DL — HIGH (ref 70–99)
GRAM STN FLD: SIGNIFICANT CHANGE UP
HCT VFR BLD CALC: 24.8 % — LOW (ref 34.5–45)
HGB BLD-MCNC: 7.5 G/DL — LOW (ref 11.5–15.5)
MCHC RBC-ENTMCNC: 23.7 PG — LOW (ref 27–34)
MCHC RBC-ENTMCNC: 30.2 GM/DL — LOW (ref 32–36)
MCV RBC AUTO: 78.5 FL — LOW (ref 80–100)
METHOD TYPE: SIGNIFICANT CHANGE UP
NRBC # BLD: 0 /100 WBCS — SIGNIFICANT CHANGE UP (ref 0–0)
ORGANISM # SPEC MICROSCOPIC CNT: SIGNIFICANT CHANGE UP
ORGANISM # SPEC MICROSCOPIC CNT: SIGNIFICANT CHANGE UP
PLATELET # BLD AUTO: 218 K/UL — SIGNIFICANT CHANGE UP (ref 150–400)
POTASSIUM SERPL-MCNC: 4.3 MMOL/L — SIGNIFICANT CHANGE UP (ref 3.5–5.3)
POTASSIUM SERPL-SCNC: 4.3 MMOL/L — SIGNIFICANT CHANGE UP (ref 3.5–5.3)
PROT SERPL-MCNC: 6.7 G/DL — SIGNIFICANT CHANGE UP (ref 6–8.3)
RBC # BLD: 3.16 M/UL — LOW (ref 3.8–5.2)
RBC # FLD: 18.8 % — HIGH (ref 10.3–14.5)
SODIUM SERPL-SCNC: 133 MMOL/L — LOW (ref 135–145)
SPECIMEN SOURCE: SIGNIFICANT CHANGE UP
SPECIMEN SOURCE: SIGNIFICANT CHANGE UP
WBC # BLD: 9.33 K/UL — SIGNIFICANT CHANGE UP (ref 3.8–10.5)
WBC # FLD AUTO: 9.33 K/UL — SIGNIFICANT CHANGE UP (ref 3.8–10.5)

## 2022-04-24 PROCEDURE — 99233 SBSQ HOSP IP/OBS HIGH 50: CPT

## 2022-04-24 RX ADMIN — Medication 4: at 17:15

## 2022-04-24 RX ADMIN — Medication 650 MILLIGRAM(S): at 19:48

## 2022-04-24 RX ADMIN — Medication 1 MILLIGRAM(S): at 11:32

## 2022-04-24 RX ADMIN — HEPARIN SODIUM 5000 UNIT(S): 5000 INJECTION INTRAVENOUS; SUBCUTANEOUS at 05:02

## 2022-04-24 RX ADMIN — Medication 1 TABLET(S): at 05:02

## 2022-04-24 RX ADMIN — Medication 650 MILLIGRAM(S): at 05:01

## 2022-04-24 RX ADMIN — Medication 1 APPLICATION(S): at 17:29

## 2022-04-24 RX ADMIN — SODIUM CHLORIDE 1 GRAM(S): 9 INJECTION INTRAMUSCULAR; INTRAVENOUS; SUBCUTANEOUS at 05:02

## 2022-04-24 RX ADMIN — Medication 1 MILLIGRAM(S): at 13:36

## 2022-04-24 RX ADMIN — SIMETHICONE 80 MILLIGRAM(S): 80 TABLET, CHEWABLE ORAL at 11:32

## 2022-04-24 RX ADMIN — Medication 1 APPLICATION(S): at 05:04

## 2022-04-24 RX ADMIN — Medication 1 MILLIGRAM(S): at 07:36

## 2022-04-24 RX ADMIN — PIPERACILLIN AND TAZOBACTAM 25 GRAM(S): 4; .5 INJECTION, POWDER, LYOPHILIZED, FOR SOLUTION INTRAVENOUS at 13:00

## 2022-04-24 RX ADMIN — Medication 1 GRAM(S): at 23:44

## 2022-04-24 RX ADMIN — Medication 4: at 11:39

## 2022-04-24 RX ADMIN — CHLORHEXIDINE GLUCONATE 15 MILLILITER(S): 213 SOLUTION TOPICAL at 17:15

## 2022-04-24 RX ADMIN — Medication 1 MILLIGRAM(S): at 23:44

## 2022-04-24 RX ADMIN — HEPARIN SODIUM 5000 UNIT(S): 5000 INJECTION INTRAVENOUS; SUBCUTANEOUS at 17:18

## 2022-04-24 RX ADMIN — Medication 1 GRAM(S): at 11:32

## 2022-04-24 RX ADMIN — SIMETHICONE 80 MILLIGRAM(S): 80 TABLET, CHEWABLE ORAL at 17:19

## 2022-04-24 RX ADMIN — Medication 1 MILLIGRAM(S): at 07:40

## 2022-04-24 RX ADMIN — Medication 1 TABLET(S): at 17:19

## 2022-04-24 RX ADMIN — CHLORHEXIDINE GLUCONATE 15 MILLILITER(S): 213 SOLUTION TOPICAL at 05:02

## 2022-04-24 RX ADMIN — Medication 1 MILLIGRAM(S): at 15:43

## 2022-04-24 RX ADMIN — Medication 650 MILLIGRAM(S): at 21:27

## 2022-04-24 RX ADMIN — SIMETHICONE 80 MILLIGRAM(S): 80 TABLET, CHEWABLE ORAL at 23:44

## 2022-04-24 RX ADMIN — SIMETHICONE 80 MILLIGRAM(S): 80 TABLET, CHEWABLE ORAL at 05:02

## 2022-04-24 RX ADMIN — Medication 1 MILLIGRAM(S): at 19:48

## 2022-04-24 RX ADMIN — Medication 1 GRAM(S): at 17:19

## 2022-04-24 RX ADMIN — Medication 1 MILLIGRAM(S): at 03:33

## 2022-04-24 RX ADMIN — CHLORHEXIDINE GLUCONATE 1 APPLICATION(S): 213 SOLUTION TOPICAL at 11:30

## 2022-04-24 RX ADMIN — PIPERACILLIN AND TAZOBACTAM 25 GRAM(S): 4; .5 INJECTION, POWDER, LYOPHILIZED, FOR SOLUTION INTRAVENOUS at 21:29

## 2022-04-24 RX ADMIN — PANTOPRAZOLE SODIUM 40 MILLIGRAM(S): 20 TABLET, DELAYED RELEASE ORAL at 11:32

## 2022-04-24 RX ADMIN — Medication 1 GRAM(S): at 05:03

## 2022-04-24 RX ADMIN — PIPERACILLIN AND TAZOBACTAM 25 GRAM(S): 4; .5 INJECTION, POWDER, LYOPHILIZED, FOR SOLUTION INTRAVENOUS at 04:04

## 2022-04-24 RX ADMIN — Medication 1 APPLICATION(S): at 11:33

## 2022-04-24 RX ADMIN — SODIUM CHLORIDE 1 GRAM(S): 9 INJECTION INTRAMUSCULAR; INTRAVENOUS; SUBCUTANEOUS at 17:19

## 2022-04-24 RX ADMIN — METHOCARBAMOL 500 MILLIGRAM(S): 500 TABLET, FILM COATED ORAL at 17:19

## 2022-04-24 RX ADMIN — Medication 1 MILLIGRAM(S): at 01:34

## 2022-04-24 RX ADMIN — METHOCARBAMOL 500 MILLIGRAM(S): 500 TABLET, FILM COATED ORAL at 05:03

## 2022-04-24 RX ADMIN — Medication 4: at 05:38

## 2022-04-24 RX ADMIN — Medication 1 APPLICATION(S): at 15:43

## 2022-04-24 NOTE — PROGRESS NOTE ADULT - ASSESSMENT
75 y/o F with a h/o advanced dementia s/p PEG with severe contractures, hx of cardiac arrest, hx of subarachnoid hemorrhage, hx of CVA, COPD with chronic resp failure s/p trach/PEG, HTN, DM2 on insulin, GERD, with:    Acute on chronic hypoxemic respiratory failure, septic shock, multifocal pneumonia, ESBL e coli UTI.    - weaned from norepinephrine infusion, will d/c  - monitor hemodynamics closely, maintain a MAP > 65  - sputum gram stain shows gram neg rods, culture pending  - continue Zosyn  - actively titrating vent settings to maintain SpO2 > 92%, currently on 40% FiO2 and PEEP of 5  - keep HOB elevated > 30 degrees  - tube feedings ongoing

## 2022-04-24 NOTE — PROGRESS NOTE ADULT - SUBJECTIVE AND OBJECTIVE BOX
Neurology follow up note    BREA [id number]      Interval History:    Patient resting in bed     Allergies    codeine (Hives)    Intolerances        MEDICATIONS    acetaminophen     Tablet .. 650 milliGRAM(s) Oral every 6 hours PRN  ALBUTerol    90 MICROgram(s) HFA Inhaler 2 Puff(s) Inhalation every 6 hours PRN  chlorhexidine 0.12% Liquid 15 milliLiter(s) Oral Mucosa every 12 hours  chlorhexidine 2% Cloths 1 Application(s) Topical daily  dextrose 5%. 1000 milliLiter(s) IV Continuous <Continuous>  dextrose 5%. 1000 milliLiter(s) IV Continuous <Continuous>  dextrose 50% Injectable 25 Gram(s) IV Push once  dextrose 50% Injectable 12.5 Gram(s) IV Push once  dextrose 50% Injectable 25 Gram(s) IV Push once  dextrose Oral Gel 15 Gram(s) Oral once PRN  folic acid 1 milliGRAM(s) Oral daily  glucagon  Injectable 1 milliGRAM(s) IntraMuscular once  heparin   Injectable 5000 Unit(s) SubCutaneous every 12 hours  insulin lispro (ADMELOG) corrective regimen sliding scale   SubCutaneous every 6 hours  lactobacillus acidophilus 1 Tablet(s) Oral two times a day  LORazepam     Tablet 1 milliGRAM(s) Oral every 4 hours  LORazepam   Injectable 1 milliGRAM(s) IV Push every 6 hours PRN  methocarbamol 500 milliGRAM(s) Oral two times a day  mineral oil/petrolatum Hydrophilic Ointment 1 Application(s) Topical daily  norepinephrine Infusion 0.05 MICROgram(s)/kG/Min IV Continuous <Continuous>  pantoprazole   Suspension 40 milliGRAM(s) Oral daily  piperacillin/tazobactam IVPB.. 3.375 Gram(s) IV Intermittent every 8 hours  polyethylene glycol 3350 17 Gram(s) Oral two times a day  povidone iodine 10% Ointment 1 Application(s) Topical every 12 hours  senna 2 Tablet(s) Oral at bedtime  silver sulfADIAZINE 1% Cream 1 Application(s) Topical daily  simethicone 80 milliGRAM(s) Chew every 6 hours  sodium chloride 1 Gram(s) Oral two times a day  sodium chloride 0.9% lock flush 10 milliLiter(s) IV Push every 1 hour PRN  sucralfate 1 Gram(s) Oral four times a day              Vital Signs Last 24 Hrs  T(C): 37.1 (24 Apr 2022 12:51), Max: 37.7 (24 Apr 2022 03:41)  T(F): 98.7 (24 Apr 2022 12:51), Max: 99.8 (24 Apr 2022 03:41)  HR: 100 (24 Apr 2022 13:47) (79 - 112)  BP: 94/83 (24 Apr 2022 13:00) (90/75 - 133/101)  BP(mean): 88 (24 Apr 2022 13:00) (64 - 111)  RR: 20 (24 Apr 2022 13:00) (18 - 36)  SpO2: 94% (24 Apr 2022 13:47) (93% - 100%)        REVIEW OF SYSTEMS:  Cannot be obtained secondary to the patient being nonverbal.    PHYSICAL EXAMINATION:    HEENT:  Head:  Normocephalic.  Eyes:  No scleral icterus.  Ears:  Unable to evaluate with the patient being nonverbal and unresponsive.  NECK:  Tracheostomy was in place.  RESPIRATORY: Decreased breath sounds bilaterally.  ABDOMEN: Soft and nontender.  EXTREMITIES:  No clubbing or cyanosis was noted.      NEUROLOGIC:  No response to verbal stimuli.  I opened the patient's eyes, no blink to visual threat.  To painful stimuli, slight withdrawal bilateral upper and lower.  Motor:  Bilateral upper extremity was in flexed position.  Bilateral lower extremities were straight.  Upon stimulation, the patient would have subtle shaking.                    LABS:  CBC Full  -  ( 24 Apr 2022 05:50 )  WBC Count : 9.33 K/uL  RBC Count : 3.16 M/uL  Hemoglobin : 7.5 g/dL  Hematocrit : 24.8 %  Platelet Count - Automated : 218 K/uL  Mean Cell Volume : 78.5 fl  Mean Cell Hemoglobin : 23.7 pg  Mean Cell Hemoglobin Concentration : 30.2 gm/dL  Auto Neutrophil # : x  Auto Lymphocyte # : x  Auto Monocyte # : x  Auto Eosinophil # : x  Auto Basophil # : x  Auto Neutrophil % : x  Auto Lymphocyte % : x  Auto Monocyte % : x  Auto Eosinophil % : x  Auto Basophil % : x      04-24    133<L>  |  99  |  43<H>  ----------------------------<  208<H>  4.3   |  28  |  1.15    Ca    8.8      24 Apr 2022 05:50  Phos  2.6     04-23  Mg     2.4     04-23    TPro  6.7  /  Alb  1.7<L>  /  TBili  0.5  /  DBili  x   /  AST  20  /  ALT  21  /  AlkPhos  197<H>  04-24    Hemoglobin A1C:     LIVER FUNCTIONS - ( 24 Apr 2022 05:50 )  Alb: 1.7 g/dL / Pro: 6.7 g/dL / ALK PHOS: 197 U/L / ALT: 21 U/L DA / AST: 20 U/L / GGT: x           Vitamin B12         RADIOLOGY    ANALYSIS AND PLAN:  This is a 76-year-old with altered mental status and history of abnormal movements.  For altered mental status, most likely metabolic encephalopathy secondary to underlying pneumonia-type process along with signs of dehydration from SMA-7.  Antibiotics as needed.  Monitor renal function.  For abnormal movements, the patient has been evaluated multiple times in the past, as per my conversation with spouse, these were deemed nonepileptiform, at present we will refrain from any antiseizure medication and had multiple conversations in the past with spouse, he does not want any.  For history of spasms, continue the patient on muscle relaxants.  For history of diabetes, strict control of blood sugars.  If possible, please maintain a systolic blood pressure above 100 to help maintain cerebral perfusion.    The patient's spouse's name is Marciano, telephone number is [phone].    Greater than 32 minutes of time spent with the patient, planning care, reviewing data, and speaking to multidisciplinary healthcare team with greater than 50% of that time in counseling and care coordination.

## 2022-04-24 NOTE — PROGRESS NOTE ADULT - SUBJECTIVE AND OBJECTIVE BOX
BREA BECKHAM    [facility]    Allergies    codeine (Hives)    Intolerances        PAST MEDICAL & SURGICAL HISTORY:  Dementia of frontal lobe type    Aphasic stroke    Diabetes mellitus    Respiratory failure    Hypertension    GERD (gastroesophageal reflux disease)    Constipation    Respiratory failure    CVA (cerebral vascular accident)    HTN (hypertension)    DM (diabetes mellitus)    Advanced dementia    COVID-19 virus detected    Quadriplegia    Pneumonia    Type II diabetes mellitus    Hx of appendectomy    Gastrostomy in place    Tracheostomy in place    Tracheostomy tube present    Feeding by G-tube        FAMILY HISTORY:  No pertinent family history in first degree relatives        Home Medications:  albuterol 90 mcg/inh inhalation aerosol with adapter: 2  inhaled every 6 hours (21 Apr 2022 14:01)  Bacid (LAC) oral tablet: 2 tab(s) by gastrostomy tube once a day (21 Apr 2022 13:45)  Basaglar KwikPen 100 units/mL subcutaneous solution: 36 unit(s) subcutaneous once a day (at bedtime) (21 Apr 2022 14:01)  Betadine 10% topical swab: cleanse right and left great toes (21 Apr 2022 14:01)  Carafate 1 g/10 mL oral suspension: 10 milliliter(s) by gastrostomy tube 4 times a day (before meals and at bedtime) for 14 days (Started 6/4/21) (21 Apr 2022 13:45)  chlorhexidine 0.12% mucous membrane liquid: 15 milliliter(s) mucous membrane 2 times a day (21 Apr 2022 13:45)  Eucerin topical cream: Apply topically to affected area once a day bilateral feet (21 Apr 2022 14:01)  ipratropium-albuterol 0.5 mg-2.5 mg/3 mL inhalation solution: 3 milliliter(s) inhaled 4 times a day (21 Apr 2022 13:45)  LORazepam 1 mg oral tablet: 1 tab(s) by gastrostomy tube every 4 hours (21 Apr 2022 14:01)  methocarbamol 500 mg oral tablet: 1 tab(s) by gastrostomy tube 2 times a day (21 Apr 2022 14:01)  pantoprazole: 20 milliliter(s) by gastrostomy tube 2 times a day (21 Apr 2022 14:01)  polyethylene glycol 3350 oral powder for reconstitution: 17 gram(s) by gastrostomy tube every 12 hours (21 Apr 2022 14:01)  senna 8.6 mg oral tablet: 3 tab(s) by gastrostomy tube once a day (at bedtime) (21 Apr 2022 13:45)  silver sulfADIAZINE 1% topical cream: Apply topically to affected area once a day to sacrum (21 Apr 2022 14:01)  simethicone 80 mg oral tablet, chewable: 1 tab(s) by gastrostomy tube every 6 hours (21 Apr 2022 14:01)  Tylenol 325 mg oral tablet: 2 tab(s) by gastrostomy tube once a day; 60 minutes prior to dressing change  (21 Apr 2022 13:45)  Tylenol 325 mg oral tablet: 2 tab(s) by gastrostomy tube every 6 hours, As Needed (21 Apr 2022 14:01)      MEDICATIONS  (STANDING):  chlorhexidine 0.12% Liquid 15 milliLiter(s) Oral Mucosa every 12 hours  chlorhexidine 2% Cloths 1 Application(s) Topical daily  dextrose 5%. 1000 milliLiter(s) (100 mL/Hr) IV Continuous <Continuous>  dextrose 5%. 1000 milliLiter(s) (50 mL/Hr) IV Continuous <Continuous>  dextrose 50% Injectable 25 Gram(s) IV Push once  dextrose 50% Injectable 12.5 Gram(s) IV Push once  dextrose 50% Injectable 25 Gram(s) IV Push once  folic acid 1 milliGRAM(s) Oral daily  glucagon  Injectable 1 milliGRAM(s) IntraMuscular once  heparin   Injectable 5000 Unit(s) SubCutaneous every 12 hours  insulin lispro (ADMELOG) corrective regimen sliding scale   SubCutaneous every 6 hours  lactobacillus acidophilus 1 Tablet(s) Oral two times a day  LORazepam     Tablet 1 milliGRAM(s) Oral every 4 hours  methocarbamol 500 milliGRAM(s) Oral two times a day  mineral oil/petrolatum Hydrophilic Ointment 1 Application(s) Topical daily  norepinephrine Infusion 0.05 MICROgram(s)/kG/Min (2.34 mL/Hr) IV Continuous <Continuous>  pantoprazole   Suspension 40 milliGRAM(s) Oral daily  piperacillin/tazobactam IVPB.. 3.375 Gram(s) IV Intermittent every 8 hours  polyethylene glycol 3350 17 Gram(s) Oral two times a day  povidone iodine 10% Ointment 1 Application(s) Topical every 12 hours  senna 2 Tablet(s) Oral at bedtime  silver sulfADIAZINE 1% Cream 1 Application(s) Topical daily  simethicone 80 milliGRAM(s) Chew every 6 hours  sodium chloride 1 Gram(s) Oral two times a day  sucralfate 1 Gram(s) Oral four times a day    MEDICATIONS  (PRN):  acetaminophen     Tablet .. 650 milliGRAM(s) Oral every 6 hours PRN Temp greater or equal to 38C (100.4F)  ALBUTerol    90 MICROgram(s) HFA Inhaler 2 Puff(s) Inhalation every 6 hours PRN Shortness of Breath and/or Wheezing  dextrose Oral Gel 15 Gram(s) Oral once PRN Blood Glucose LESS THAN 70 milliGRAM(s)/deciliter  LORazepam   Injectable 1 milliGRAM(s) IV Push every 6 hours PRN Anxiety  sodium chloride 0.9% lock flush 10 milliLiter(s) IV Push every 1 hour PRN Pre/post blood products, medications, blood draw, and to maintain line patency      Diet, NPO with Tube Feed:   Tube Feeding Modality: Gastrostomy  Glucerna 1.5 Horacio  Total Volume for 24 Hours (mL): 1200  Continuous  Starting Tube Feed Rate mL per Hour: 20  Increase Tube Feed Rate by (mL): 10     Every 6 hours  Until Goal Tube Feed Rate (mL per Hour): 60  Tube Feed Duration (in Hours): 20  Tube Feed Start Time: 00:00  Free Water Flush  Pump   Rate (mL per Hour): 30   Frequency: Every Hour    Duration (Hours): 24 (04-22-22 @ 12:49) [Active]          Vital Signs Last 24 Hrs  T(C): 37.7 (24 Apr 2022 03:41), Max: 37.7 (24 Apr 2022 03:41)  T(F): 99.8 (24 Apr 2022 03:41), Max: 99.8 (24 Apr 2022 03:41)  HR: 97 (24 Apr 2022 09:00) (79 - 112)  BP: 93/73 (24 Apr 2022 09:00) (80/56 - 133/101)  BP(mean): 81 (24 Apr 2022 09:00) (64 - 111)  RR: 20 (24 Apr 2022 09:00) (18 - 36)  SpO2: 97% (24 Apr 2022 09:00) (95% - 100%)      04-23-22 @ 07:01  -  04-24-22 @ 07:00  --------------------------------------------------------  IN: 2460 mL / OUT: 1300 mL / NET: 1160 mL        Mode: AC/ CMV (Assist Control/ Continuous Mandatory Ventilation), RR (machine): 18, TV (machine): 400, FiO2: 40, PEEP: 5, ITime: 1, MAP: 17, PIP: 42      LABS:                        7.5    9.33  )-----------( 218      ( 24 Apr 2022 05:50 )             24.8     04-24    133<L>  |  99  |  43<H>  ----------------------------<  208<H>  4.3   |  28  |  1.15    Ca    8.8      24 Apr 2022 05:50  Phos  2.6     04-23  Mg     2.4     04-23    TPro  6.7  /  Alb  1.7<L>  /  TBili  0.5  /  DBili  x   /  AST  20  /  ALT  21  /  AlkPhos  197<H>  04-24              WBC:  WBC Count: 9.33 K/uL (04-24 @ 05:50)  WBC Count: 11.35 K/uL (04-23 @ 06:07)  WBC Count: 15.47 K/uL (04-22 @ 06:58)  WBC Count: 14.93 K/uL (04-21 @ 22:35)  WBC Count: 14.15 K/uL (04-21 @ 13:04)      MICROBIOLOGY:  RECENT CULTURES:  04-21 Clean Catch Clean Catch (Midstream) Escherichia coli ESBL XXXX   >100,000 CFU/ml Escherichia coli ESBL    04-21 .Blood Blood-Peripheral XXXX XXXX   No growth to date.                    Sodium:  Sodium, Serum: 133 mmol/L (04-24 @ 05:50)  Sodium, Serum: 131 mmol/L (04-23 @ 06:07)  Sodium, Serum: 133 mmol/L (04-22 @ 06:58)  Sodium, Serum: 133 mmol/L (04-21 @ 22:35)  Sodium, Serum: 134 mmol/L (04-21 @ 13:04)      1.15 mg/dL 04-24 @ 05:50  1.20 mg/dL 04-23 @ 06:07  1.10 mg/dL 04-22 @ 06:58  1.29 mg/dL 04-21 @ 22:35  1.36 mg/dL 04-21 @ 13:04      Hemoglobin:  Hemoglobin: 7.5 g/dL (04-24 @ 05:50)  Hemoglobin: 7.7 g/dL (04-23 @ 06:07)  Hemoglobin: 8.4 g/dL (04-22 @ 06:58)  Hemoglobin: 8.3 g/dL (04-21 @ 22:35)  Hemoglobin: 6.0 g/dL (04-21 @ 13:04)      Platelets: Platelet Count - Automated: 218 K/uL (04-24 @ 05:50)  Platelet Count - Automated: 210 K/uL (04-23 @ 06:07)  Platelet Count - Automated: 188 K/uL (04-22 @ 06:58)  Platelet Count - Automated: 170 K/uL (04-21 @ 22:35)  Platelet Count - Automated: 176 K/uL (04-21 @ 13:04)      LIVER FUNCTIONS - ( 24 Apr 2022 05:50 )  Alb: 1.7 g/dL / Pro: 6.7 g/dL / ALK PHOS: 197 U/L / ALT: 21 U/L DA / AST: 20 U/L / GGT: x                 RADIOLOGY & ADDITIONAL STUDIES:      MICROBIOLOGY:  RECENT CULTURES:  04-21 Clean Catch Clean Catch (Midstream) Escherichia coli ESBL XXXX   >100,000 CFU/ml Escherichia coli ESBL    04-21 .Blood Blood-Peripheral XXXX XXXX   No growth to date.

## 2022-04-24 NOTE — PROGRESS NOTE ADULT - ASSESSMENT
75 y/o F with PMH of HTN, DM type 2, CVA, recent Cardiac Arrest, Chronic Respiratory Failure, Vent Dependant s/p trach & PEG, Anemia with GI blood loss, and Dementia who was sent from [facility] facility for acute respiratory distress, fevers, hypotension.    sepsis  hypotension  OP  OA  chr resp failure  cva  hx of card arrest  DM    overnight events noted - vs noted - benzo rx regimen in progress - labs reviewed - vent support in progress -     emp ABX  cx - biomarkers  ct imaging reviewed  labs reviewed  assist with needs    with HCP   pt is full code  old records reviewed  GOC documented

## 2022-04-24 NOTE — PROGRESS NOTE ADULT - ASSESSMENT
CHAPINCITO GUIDRY 75 f [facility] S 4/21/2022   DR ILIANA KEMP     REVIEW OF SYMPTOMS      Able to give (reliable) ROS  NO     PHYSICAL EXAM    HEENT Unremarkable  atraumatic   RESP Fair air entry EXP prolonged    Harsh breath sound Resp distres mild   CARDIAC S1 S2 No S3     NO JVD    ABDOMEN SOFT BS PRESENT NOT DISTENDED No hepatosplenomegaly   PEDAL EDEMA present No calf tenderness  NO rash       DOA/CC/PROBLEMS poa .  76 f   from [facility]  doa 4/21/2022  cc fever hypotension    PRESENTING PROBLEMS 4/21 4/23/2022   Sepsis   Lacticemia La 2.8   Anemia Hb 6   RYAN Cr 1.3     HOSP COURSE.  4/21 c line int jugul  4/21 ZOSYN   4/21 1 u prbc   4/21 urine c 100k e coli   4/22/2022 weaned off nore  4/22 C diff indet     PMH-PSH .  Pneumonia  HTN, DM, CVA, dementia, chronic respiratory failure s/p trach, PEG, cardiac arrest      COVID/ICU/CODE STATUS.                       COVID  STATUS.           ICU STAY. none  GOC.      BEST PRACTICE ISSUES.                                                  HEAD OF BED ELEVATION. Yes  DVT PROPHYLAXIS.    4/21 hpsc   INFECTION PROPHYLAXIS.   4/22 chlorhexidine 2%   4/21/2022 chlorhexidine .12%    SQUIRES PROPHYLAXIS.  4/21 protonix    4/21 carafate                                                                                      DIET.  4/22 glucerna 1.5 1200 gt               VITALS/PO/IO/VENT/DRIPS.  4/24/2022 100 94/80   4/24/2022 ac 18/400/5/.4      PROBLEM DATA/ASSESSMENT RECOMMENDATIONS (A/R).    HEMODYNAMICS.   Monitor bp Target MAP 65 (+)    RESP.   Monitor po Target po 90-95%      PMH/PSH PROBLEMS.  Management continued/modified as indicated    VENT MANAGEMENT.   HOB elevation  Target Pplat 30 (-)  Target PO 90-95%  Target pH 730 (+)  Daily spontaneous breathing trials   Daily sedation vacation         INFECTION SOURCE.   VAP   INFECTION A/R.   4/22 C diff indet   4/21 urine c 100k e coli   ID Dr BRITTANY Brantley on case aware  Is on zosyn    INFECTION AUGUST.  W 4/21-4/22-4/23-4/24/2022 w 14 - 15-11-9.3   UA 4/21/2022 w 11-25   ct 4/22/2022 ct ch 4/21/2022   bl effsns increased in size cw 1/2022 Distended esophagus as before G tube Woody   CXR l gr than r perihilar dis   MICROBIO.  Rvp 4/21 (-)   4/22 C diff indet   4/21 urine c 100k e coli   4/21 blod c (-)    4/24/2022 sputum mod gnr   ABIO.  4/21 ZOSYN       SHOCK.  4/21 norepi  4/22/2022 wened off nore     LACTICEMIA.  La 4/21-4/22/2022 la 2.8 - 1.2     COPD.  4/21 albuterol  4/21 spiriva   under control    ANEMIA.   Hb 4/21-4/22-4/23-4/24/2022 hb 6 - 8.4 -7.7 - 7.5   Monitro serial    TRANSFUSION  4/21 1 u prbc     HYPONATREMIA.  Na 4/21-4/22-4/23-4/24/2022 na 134- 133-131-133   4/23/2022 nacl added     RYAN.  Cr 4/21-4/22-4/24/2022 Cr 1.3 - 1.1-1.1      AMS.  ct 4/21 ct head (-)    ANXIETY.  4/23/2022 lorazepam 1.4p  4/21 lorazepam 1.6     SPASMS   4/21 methocarbamol 500.2       TIME SPENT   Over 36 minutes aggregate critical care time spent on encounter; activities included   direct patient care, counseling and/or coordinating care reviewing notes, lab data/ imaging , discussion with multidisciplinary team/ patient  /family and explaining in detail risks, benefits, alternatives  of the recommendations     CHAPINCITO GUIDRY 75 f [facility] S 4/21/2022   DR ILIANA KEMP

## 2022-04-24 NOTE — PROGRESS NOTE ADULT - SUBJECTIVE AND OBJECTIVE BOX
Date/Time Patient Seen:  		  Referring MD:   Data Reviewed	       Patient is a 76y old  Female who presents with a chief complaint of Anemia (23 Apr 2022 19:49)      Subjective/HPI     PAST MEDICAL & SURGICAL HISTORY:  Dementia of frontal lobe type    Aphasic stroke    Diabetes mellitus    Respiratory failure    Hypertension    GERD (gastroesophageal reflux disease)    Constipation    Respiratory failure    CVA (cerebral vascular accident)    HTN (hypertension)    DM (diabetes mellitus)    Advanced dementia    COVID-19 virus detected    Quadriplegia    Pneumonia    Type II diabetes mellitus    Hx of appendectomy    Gastrostomy in place    Tracheostomy in place    Tracheostomy tube present    Feeding by G-tube          Medication list         MEDICATIONS  (STANDING):  chlorhexidine 0.12% Liquid 15 milliLiter(s) Oral Mucosa every 12 hours  chlorhexidine 2% Cloths 1 Application(s) Topical daily  dextrose 5%. 1000 milliLiter(s) (50 mL/Hr) IV Continuous <Continuous>  dextrose 5%. 1000 milliLiter(s) (100 mL/Hr) IV Continuous <Continuous>  dextrose 50% Injectable 25 Gram(s) IV Push once  dextrose 50% Injectable 12.5 Gram(s) IV Push once  dextrose 50% Injectable 25 Gram(s) IV Push once  folic acid 1 milliGRAM(s) Oral daily  glucagon  Injectable 1 milliGRAM(s) IntraMuscular once  heparin   Injectable 5000 Unit(s) SubCutaneous every 12 hours  insulin lispro (ADMELOG) corrective regimen sliding scale   SubCutaneous every 6 hours  lactobacillus acidophilus 1 Tablet(s) Oral two times a day  LORazepam     Tablet 1 milliGRAM(s) Oral every 4 hours  methocarbamol 500 milliGRAM(s) Oral two times a day  mineral oil/petrolatum Hydrophilic Ointment 1 Application(s) Topical daily  norepinephrine Infusion 0.05 MICROgram(s)/kG/Min (2.34 mL/Hr) IV Continuous <Continuous>  pantoprazole   Suspension 40 milliGRAM(s) Oral daily  piperacillin/tazobactam IVPB.. 3.375 Gram(s) IV Intermittent every 8 hours  polyethylene glycol 3350 17 Gram(s) Oral two times a day  povidone iodine 10% Ointment 1 Application(s) Topical every 12 hours  senna 2 Tablet(s) Oral at bedtime  silver sulfADIAZINE 1% Cream 1 Application(s) Topical daily  simethicone 80 milliGRAM(s) Chew every 6 hours  sodium chloride 1 Gram(s) Oral two times a day  sucralfate 1 Gram(s) Oral four times a day    MEDICATIONS  (PRN):  acetaminophen     Tablet .. 650 milliGRAM(s) Oral every 6 hours PRN Temp greater or equal to 38C (100.4F)  ALBUTerol    90 MICROgram(s) HFA Inhaler 2 Puff(s) Inhalation every 6 hours PRN Shortness of Breath and/or Wheezing  dextrose Oral Gel 15 Gram(s) Oral once PRN Blood Glucose LESS THAN 70 milliGRAM(s)/deciliter  LORazepam   Injectable 1 milliGRAM(s) IV Push every 6 hours PRN Anxiety  sodium chloride 0.9% lock flush 10 milliLiter(s) IV Push every 1 hour PRN Pre/post blood products, medications, blood draw, and to maintain line patency         Vitals log        ICU Vital Signs Last 24 Hrs  T(C): 37.7 (24 Apr 2022 03:41), Max: 37.7 (24 Apr 2022 03:41)  T(F): 99.8 (24 Apr 2022 03:41), Max: 99.8 (24 Apr 2022 03:41)  HR: 105 (24 Apr 2022 06:00) (79 - 112)  BP: 96/58 (24 Apr 2022 06:00) (70/44 - 133/101)  BP(mean): 69 (24 Apr 2022 06:00) (53 - 111)  ABP: 89/47 (24 Apr 2022 06:00) (88/41 - 140/65)  ABP(mean): 63 (24 Apr 2022 06:00) (59 - 94)  RR: 19 (24 Apr 2022 06:00) (12 - 36)  SpO2: 95% (24 Apr 2022 06:00) (95% - 100%)       Mode: AC/ CMV (Assist Control/ Continuous Mandatory Ventilation)  RR (machine): 18  TV (machine): 400  FiO2: 40  PEEP: 5  ITime: 1  MAP: 18  PIP: 42      Input and Output:  I&O's Detail    22 Apr 2022 07:01  -  23 Apr 2022 07:00  --------------------------------------------------------  IN:    Enteral Tube Flush: 125 mL    Free Water: 400 mL    IV PiggyBack: 100 mL    Norepinephrine: 4.3 mL  Total IN: 629.3 mL    OUT:    Indwelling Catheter - Urethral (mL): 950 mL    Rectal Tube (mL): 200 mL  Total OUT: 1150 mL    Total NET: -520.7 mL      23 Apr 2022 07:01  -  24 Apr 2022 06:20  --------------------------------------------------------  IN:    Enteral Tube Flush: 550 mL    Free Water: 400 mL    Glucerna 1.5: 1200 mL    IV PiggyBack: 200 mL  Total IN: 2350 mL    OUT:    Indwelling Catheter - Urethral (mL): 1300 mL  Total OUT: 1300 mL    Total NET: 1050 mL          Lab Data                        7.5    9.33  )-----------( 218      ( 24 Apr 2022 05:50 )             24.8     04-24    133<L>  |  99  |  43<H>  ----------------------------<  208<H>  4.3   |  28  |  1.15    Ca    8.8      24 Apr 2022 05:50  Phos  2.6     04-23  Mg     2.4     04-23    TPro  6.7  /  Alb  1.7<L>  /  TBili  0.5  /  DBili  x   /  AST  20  /  ALT  21  /  AlkPhos  197<H>  04-24            Review of Systems	      Objective     Physical Examination    heart s1s2  lung dec bS  abd soft      Pertinent Lab findings & Imaging      Woody:  NO   Adequate UO     I&O's Detail    22 Apr 2022 07:01  -  23 Apr 2022 07:00  --------------------------------------------------------  IN:    Enteral Tube Flush: 125 mL    Free Water: 400 mL    IV PiggyBack: 100 mL    Norepinephrine: 4.3 mL  Total IN: 629.3 mL    OUT:    Indwelling Catheter - Urethral (mL): 950 mL    Rectal Tube (mL): 200 mL  Total OUT: 1150 mL    Total NET: -520.7 mL      23 Apr 2022 07:01  -  24 Apr 2022 06:20  --------------------------------------------------------  IN:    Enteral Tube Flush: 550 mL    Free Water: 400 mL    Glucerna 1.5: 1200 mL    IV PiggyBack: 200 mL  Total IN: 2350 mL    OUT:    Indwelling Catheter - Urethral (mL): 1300 mL  Total OUT: 1300 mL    Total NET: 1050 mL               Discussed with:     Cultures:	        Radiology

## 2022-04-24 NOTE — PROGRESS NOTE ADULT - ASSESSMENT
The patient is a 76 year old female with a history of HTN, DM, CVA, dementia, chronic respiratory failure s/p trach, PEG, cardiac arrest, anemia who presented with respiratory distress, fevers, hypotension.    Plan:  - Echo 9/21 with normal LV systolic function, mod pulm HTN  - CT chest with bilateral infiltrates and pleural effusions  - Weaned off of norepinephrine  - If BP trends down, start midodrine  - Hemoglobin low but stable. Monitor. If trends down further may need transfusion again.  - On zosyn  - Mechanical ventilation  - Overall prognosis remains poor

## 2022-04-24 NOTE — PROGRESS NOTE ADULT - SUBJECTIVE AND OBJECTIVE BOX
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 Patient unable to contribute to hx due to mental status.    In the ED: wbc 14.15, HGB 6.0, INR 1.40, sodium 134, cr 1.36, lactate 2.8. UA sig for mod LE, large blood, wbc, bacteria. Given Zosyn and ns.  (21 Apr 2022 14:02)      SUBJECTIVE & OBJECTIVE: Pt seen and examined at bedside. on trach    PHYSICAL EXAM:  T(C): 37.7 (04-24-22 @ 03:41), Max: 37.7 (04-24-22 @ 03:41)  HR: 100 (04-24-22 @ 07:00) (79 - 112)  BP: 94/59 (04-24-22 @ 07:00) (70/44 - 133/101)  RR: 18 (04-24-22 @ 07:00) (18 - 36)  SpO2: 97% (04-24-22 @ 07:00) (95% - 100%)  Wt(kg): --   GENERAL: on trach/vent   NERVOUS SYSTEM:  Alert   CHEST/LUNG: tracth   HEART: Regular rate and rhythm; No murmurs, rubs, or gallops  ABDOMEN: Soft, Nontender, Nondistended; Bowel sounds present  EXTREMITIES:  2+ Peripheral Pulses, No clubbing, cyanosis, or edema        MEDICATIONS  (STANDING):  chlorhexidine 0.12% Liquid 15 milliLiter(s) Oral Mucosa every 12 hours  chlorhexidine 2% Cloths 1 Application(s) Topical daily  dextrose 5%. 1000 milliLiter(s) (100 mL/Hr) IV Continuous <Continuous>  dextrose 5%. 1000 milliLiter(s) (50 mL/Hr) IV Continuous <Continuous>  dextrose 50% Injectable 25 Gram(s) IV Push once  dextrose 50% Injectable 12.5 Gram(s) IV Push once  dextrose 50% Injectable 25 Gram(s) IV Push once  folic acid 1 milliGRAM(s) Oral daily  glucagon  Injectable 1 milliGRAM(s) IntraMuscular once  heparin   Injectable 5000 Unit(s) SubCutaneous every 12 hours  insulin lispro (ADMELOG) corrective regimen sliding scale   SubCutaneous every 6 hours  lactobacillus acidophilus 1 Tablet(s) Oral two times a day  LORazepam     Tablet 1 milliGRAM(s) Oral every 4 hours  methocarbamol 500 milliGRAM(s) Oral two times a day  mineral oil/petrolatum Hydrophilic Ointment 1 Application(s) Topical daily  norepinephrine Infusion 0.05 MICROgram(s)/kG/Min (2.34 mL/Hr) IV Continuous <Continuous>  pantoprazole   Suspension 40 milliGRAM(s) Oral daily  piperacillin/tazobactam IVPB.. 3.375 Gram(s) IV Intermittent every 8 hours  polyethylene glycol 3350 17 Gram(s) Oral two times a day  povidone iodine 10% Ointment 1 Application(s) Topical every 12 hours  senna 2 Tablet(s) Oral at bedtime  silver sulfADIAZINE 1% Cream 1 Application(s) Topical daily  simethicone 80 milliGRAM(s) Chew every 6 hours  sodium chloride 1 Gram(s) Oral two times a day  sucralfate 1 Gram(s) Oral four times a day    MEDICATIONS  (PRN):  acetaminophen     Tablet .. 650 milliGRAM(s) Oral every 6 hours PRN Temp greater or equal to 38C (100.4F)  ALBUTerol    90 MICROgram(s) HFA Inhaler 2 Puff(s) Inhalation every 6 hours PRN Shortness of Breath and/or Wheezing  dextrose Oral Gel 15 Gram(s) Oral once PRN Blood Glucose LESS THAN 70 milliGRAM(s)/deciliter  LORazepam   Injectable 1 milliGRAM(s) IV Push every 6 hours PRN Anxiety  sodium chloride 0.9% lock flush 10 milliLiter(s) IV Push every 1 hour PRN Pre/post blood products, medications, blood draw, and to maintain line patency      LABS:                        7.5    9.33  )-----------( 218      ( 24 Apr 2022 05:50 )             24.8     04-24    133<L>  |  99  |  43<H>  ----------------------------<  208<H>  4.3   |  28  |  1.15    Ca    8.8      24 Apr 2022 05:50  Phos  2.6     04-23  Mg     2.4     04-23    TPro  6.7  /  Alb  1.7<L>  /  TBili  0.5  /  DBili  x   /  AST  20  /  ALT  21  /  AlkPhos  197<H>  04-24          CAPILLARY BLOOD GLUCOSE      POCT Blood Glucose.: 205 mg/dL (24 Apr 2022 05:35)  POCT Blood Glucose.: 207 mg/dL (23 Apr 2022 23:47)  POCT Blood Glucose.: 195 mg/dL (23 Apr 2022 17:27)  POCT Blood Glucose.: 226 mg/dL (23 Apr 2022 11:43)      CAPILLARY BLOOD GLUCOSE      POCT Blood Glucose.: 205 mg/dL (24 Apr 2022 05:35)  POCT Blood Glucose.: 207 mg/dL (23 Apr 2022 23:47)  POCT Blood Glucose.: 195 mg/dL (23 Apr 2022 17:27)  POCT Blood Glucose.: 226 mg/dL (23 Apr 2022 11:43)    CAPILLARY BLOOD GLUCOSE      POCT Blood Glucose.: 205 mg/dL (24 Apr 2022 05:35)            RECENT CULTURES:      RADIOLOGY & ADDITIONAL TESTS:                        DVT/GI ppx  Discussed with pt @ bedside

## 2022-04-24 NOTE — PROGRESS NOTE ADULT - SUBJECTIVE AND OBJECTIVE BOX
Chief Complaint: Respiratory failure    Interval Events: No events overnight.    Review of Systems:  Unable to obtain    Physical Exam:  Vital Signs Last 24 Hrs  T(C): 37.7 (24 Apr 2022 03:41), Max: 37.7 (24 Apr 2022 03:41)  T(F): 99.8 (24 Apr 2022 03:41), Max: 99.8 (24 Apr 2022 03:41)  HR: 100 (24 Apr 2022 07:00) (79 - 112)  BP: 94/59 (24 Apr 2022 07:00) (70/44 - 133/101)  BP(mean): 71 (24 Apr 2022 07:00) (53 - 111)  RR: 18 (24 Apr 2022 07:00) (18 - 36)  SpO2: 97% (24 Apr 2022 07:00) (95% - 100%)  General: NAD  HEENT: Trach  Neck: No JVD, no carotid bruit  Lungs: Coarse bilaterally  CV: RRR, nl S1/S2, no M/R/G  Abdomen: S/NT/ND, +BS  Extremities: No LE edema, no cyanosis  Neuro: AAOx0  Skin: No rash    Labs:             04-24    133<L>  |  99  |  43<H>  ----------------------------<  208<H>  4.3   |  28  |  1.15    Ca    8.8      24 Apr 2022 05:50  Phos  2.6     04-23  Mg     2.4     04-23    TPro  6.7  /  Alb  1.7<L>  /  TBili  0.5  /  DBili  x   /  AST  20  /  ALT  21  /  AlkPhos  197<H>  04-24                        7.5    9.33  )-----------( 218      ( 24 Apr 2022 05:50 )             24.8       Telemetry: Sinus rhythm

## 2022-04-24 NOTE — PROGRESS NOTE ADULT - SUBJECTIVE AND OBJECTIVE BOX
Patient is a 76y old  Female who presents with a chief complaint of Anemia (24 Apr 2022 15:58)      BRIEF HOSPITAL COURSE: 77 y/o F with a h/o advanced dementia s/p PEG with severe contractures, hx of cardiac arrest, hx of subarachnoid hemorrhage, hx of CVA, COPD with chronic resp failure s/p trach/PEG, HTN, DM2 on insulin, GERD, admitted on 4/21 from [facility] facility for acute respiratory distress, multifocal pneumonia, ESBL e coli UTI, and septic shock.     Events last 24 hours: No longer requiring IV vasopressor therapy. Sputum gram stain shows gram neg rods. Low grade fever. On full mechanical vent support.        PAST MEDICAL & SURGICAL HISTORY:  Dementia of frontal lobe type    Aphasic stroke    Diabetes mellitus    Respiratory failure    Hypertension    GERD (gastroesophageal reflux disease)    Constipation    Respiratory failure    CVA (cerebral vascular accident)    HTN (hypertension)    DM (diabetes mellitus)    Advanced dementia    COVID-19 virus detected    Quadriplegia    Pneumonia    Type II diabetes mellitus    Hx of appendectomy    Gastrostomy in place    Tracheostomy in place    Tracheostomy tube present    Feeding by G-tube        Review of Systems:  Unable to obtain secondary to AMS      Medications:  piperacillin/tazobactam IVPB.. 3.375 Gram(s) IV Intermittent every 8 hours  norepinephrine Infusion 0.05 MICROgram(s)/kG/Min IV Continuous <Continuous>  ALBUTerol    90 MICROgram(s) HFA Inhaler 2 Puff(s) Inhalation every 6 hours PRN  acetaminophen     Tablet .. 650 milliGRAM(s) Oral every 6 hours PRN  LORazepam     Tablet 1 milliGRAM(s) Oral every 4 hours  LORazepam   Injectable 1 milliGRAM(s) IV Push every 6 hours PRN  methocarbamol 500 milliGRAM(s) Oral two times a day  heparin   Injectable 5000 Unit(s) SubCutaneous every 12 hours  pantoprazole   Suspension 40 milliGRAM(s) Oral daily  polyethylene glycol 3350 17 Gram(s) Oral two times a day  senna 2 Tablet(s) Oral at bedtime  simethicone 80 milliGRAM(s) Chew every 6 hours  sucralfate 1 Gram(s) Oral four times a day  dextrose 50% Injectable 25 Gram(s) IV Push once  dextrose 50% Injectable 12.5 Gram(s) IV Push once  dextrose 50% Injectable 25 Gram(s) IV Push once  dextrose Oral Gel 15 Gram(s) Oral once PRN  glucagon  Injectable 1 milliGRAM(s) IntraMuscular once  insulin lispro (ADMELOG) corrective regimen sliding scale   SubCutaneous every 6 hours  dextrose 5%. 1000 milliLiter(s) IV Continuous <Continuous>  dextrose 5%. 1000 milliLiter(s) IV Continuous <Continuous>  folic acid 1 milliGRAM(s) Oral daily  sodium chloride 1 Gram(s) Oral two times a day  sodium chloride 0.9% lock flush 10 milliLiter(s) IV Push every 1 hour PRN  chlorhexidine 0.12% Liquid 15 milliLiter(s) Oral Mucosa every 12 hours  chlorhexidine 2% Cloths 1 Application(s) Topical daily  mineral oil/petrolatum Hydrophilic Ointment 1 Application(s) Topical daily  povidone iodine 10% Ointment 1 Application(s) Topical every 12 hours  silver sulfADIAZINE 1% Cream 1 Application(s) Topical daily  lactobacillus acidophilus 1 Tablet(s) Oral two times a day      Mode: AC/ CMV (Assist Control/ Continuous Mandatory Ventilation)  RR (machine): 18  TV (machine): 400  FiO2: 40  PEEP: 5  ITime: 1  MAP: 23  PIP: 41      ICU Vital Signs Last 24 Hrs  T(C): 37.9 (24 Apr 2022 20:00), Max: 37.9 (24 Apr 2022 20:00)  T(F): 100.3 (24 Apr 2022 20:00), Max: 100.3 (24 Apr 2022 20:00)  HR: 104 (24 Apr 2022 22:00) (84 - 112)  BP: 107/60 (24 Apr 2022 22:00) (91/55 - 133/101)  BP(mean): 74 (24 Apr 2022 22:00) (65 - 111)  ABP: 104/48 (24 Apr 2022 22:00) (89/47 - 163/88)  ABP(mean): 69 (24 Apr 2022 22:00) (63 - 117)  RR: 23 (24 Apr 2022 22:00) (18 - 42)  SpO2: 97% (24 Apr 2022 22:00) (93% - 100%)          I&O's Detail    23 Apr 2022 07:01  -  24 Apr 2022 07:00  --------------------------------------------------------  IN:    Enteral Tube Flush: 600 mL    Free Water: 400 mL    Glucerna 1.5: 1260 mL    IV PiggyBack: 200 mL  Total IN: 2460 mL    OUT:    Indwelling Catheter - Urethral (mL): 1300 mL  Total OUT: 1300 mL    Total NET: 1160 mL      24 Apr 2022 07:01  -  24 Apr 2022 22:49  --------------------------------------------------------  IN:    Enteral Tube Flush: 300 mL    Glucerna 1.5: 720 mL    IV PiggyBack: 100 mL  Total IN: 1120 mL    OUT:    Indwelling Catheter - Urethral (mL): 800 mL  Total OUT: 800 mL    Total NET: 320 mL            LABS:                        7.5    9.33  )-----------( 218      ( 24 Apr 2022 05:50 )             24.8     04-24    133<L>  |  99  |  43<H>  ----------------------------<  208<H>  4.3   |  28  |  1.15    Ca    8.8      24 Apr 2022 05:50  Phos  2.6     04-23  Mg     2.4     04-23    TPro  6.7  /  Alb  1.7<L>  /  TBili  0.5  /  DBili  x   /  AST  20  /  ALT  21  /  AlkPhos  197<H>  04-24          CAPILLARY BLOOD GLUCOSE      POCT Blood Glucose.: 213 mg/dL (24 Apr 2022 16:54)        CULTURES:  Culture Results:   Numerous Klebsiella pneumoniae (04-22-22 @ 20:58)  C Diff by PCR Result: Indeterminate (04-22-22 @ 12:07)  Culture Results:   >100,000 CFU/ml Escherichia coli ESBL (04-21-22 @ 18:36)  Culture Results:   No growth to date. (04-21-22 @ 18:24)  Culture Results:   No growth to date. (04-21-22 @ 18:24)  Rapid RVP Result: NotDetec (04-21-22 @ 13:04)        Physical Examination:    General: No acute distress.  unresponsive, contracted, trach to vent    HEENT: Pupils equal, reactive to light.  Symmetric.    PULM: coarse breath sounds bilaterally, no significant sputum production    CVS: tachycardic, reg rhythm, no murmurs, rubs, or gallops    ABD: Soft, nondistended, nontender, normoactive bowel sounds, no masses (+)PEG    EXT: No edema, nontender    SKIN: Warm and well perfused, no rashes noted.    NEURO: opens eyes, does not follow commands        RADIOLOGY:     < from: Xray Chest 1 View- PORTABLE-Urgent (Xray Chest 1 View- PORTABLE-Urgent .) (04.21.22 @ 18:50) >  FINDINGS:  CATHETERS AND TUBES: LEFT IJ catheter tip in SVC.  Tracheostomy tube in place.      PULMONARY: Increased perihilar diffuse airspace disease and/or effusions   obscuring LEFT diaphragm contour..   No pneumothorax.    HEART/VASCULAR: The heart and mediastinum size and configuration are   within normal limits.    BONES: Visualized osseous structures are intact.    IMPRESSION:   LEFT IJ catheter tip in SVC.  Increased bilateral perihilar diffuse airspace disease and/or effusions   obscuring LEFT diaphragm contour..

## 2022-04-24 NOTE — PROGRESS NOTE ADULT - ASSESSMENT
75yo F with PMH of HTN, DM type 2, CVA, hx of Cardiac Arrest, Chronic Respiratory Failure, Vent Dependant s/p trach & PEG, Anemia with GI blood loss, and Dementia who was sent from [facility] facility for acute respiratory distress, fevers, hypotension a/w acute on chronic hypoxic respiratory failure, septic shock (evolving on admission) likely due to gram negative PNA and/or CAUTI, also with severe anemia.    #Septic Shock  likely gram negative PNA and/or CAUTI  CT chest/abd/pelvis more consistent with PNA than pyelonephritis  Currently on broad spectrum abx with Zosyn which would likely cover both  last sputum Cx from 12/2021 had CRE Pseudomonas and Serratia which were sensitive to zosyn (the Stenotrophomonas may have been resistant)  f/u blood, urine, and sputum cultures  ID ([facility]on[facility]) consulted, recs appreciated  taper down levophed as able    #Acute on chronic hypoxic respiratory failure  likely due to pleural effusions and suspected gram-negative PNA  titrating down FiO2 on the vent as able  c/w zosyn for PNA  c/w inhalers per pulm (Jaime), recs appreciated    #Severe Anemia  Hematology (Chairez), recs appreciated  Work-up suggestive of anemia of chronic disease  pt started on folic acid per heme  Supportive care  Received 1 unit PRBC with good response (Hgb 6.0 ->8.4)  FOBT - negative    #RYAN   likely pre-renal, but may have some degree of ATN from septic shock  monitor BMP  Avoid nephrotoxic meds     #Diabetes  Diabetes type II (on home insulin)  Insulin Corrective Scale  Finger sticks per routine (have been in the low 100s);   Hypoglycemia protocol    #Quadriplegia:  c/w nursing care for all ADLs    #COPD:  c/w inhalers   pulm (Jaime), recs appreciated    #GERD:  c/w carafate and PPI     #VTE ppx:  c/w HSQ

## 2022-04-24 NOTE — PROGRESS NOTE ADULT - SUBJECTIVE AND OBJECTIVE BOX
[facility], Division of Infectious Diseases  MOIZ Lora Y. Patel, S. Shah, G. [facility]  [phone]    Name: BREA BECKHAM  Age: 76y  Gender: Female  MRN: [id number]    Interval History:  Patient seen and examined at bedside in SPCU  No acute overnight events.   Notes reviewed    Antibiotics:  piperacillin/tazobactam IVPB.. 3.375 Gram(s) IV Intermittent every 8 hours      Medications:  acetaminophen     Tablet .. 650 milliGRAM(s) Oral every 6 hours PRN  ALBUTerol    90 MICROgram(s) HFA Inhaler 2 Puff(s) Inhalation every 6 hours PRN  chlorhexidine 0.12% Liquid 15 milliLiter(s) Oral Mucosa every 12 hours  chlorhexidine 2% Cloths 1 Application(s) Topical daily  dextrose 5%. 1000 milliLiter(s) IV Continuous <Continuous>  dextrose 5%. 1000 milliLiter(s) IV Continuous <Continuous>  dextrose 50% Injectable 25 Gram(s) IV Push once  dextrose 50% Injectable 12.5 Gram(s) IV Push once  dextrose 50% Injectable 25 Gram(s) IV Push once  dextrose Oral Gel 15 Gram(s) Oral once PRN  folic acid 1 milliGRAM(s) Oral daily  glucagon  Injectable 1 milliGRAM(s) IntraMuscular once  heparin   Injectable 5000 Unit(s) SubCutaneous every 12 hours  insulin lispro (ADMELOG) corrective regimen sliding scale   SubCutaneous every 6 hours  lactobacillus acidophilus 1 Tablet(s) Oral two times a day  LORazepam     Tablet 1 milliGRAM(s) Oral every 4 hours  LORazepam   Injectable 1 milliGRAM(s) IV Push every 6 hours PRN  methocarbamol 500 milliGRAM(s) Oral two times a day  mineral oil/petrolatum Hydrophilic Ointment 1 Application(s) Topical daily  norepinephrine Infusion 0.05 MICROgram(s)/kG/Min IV Continuous <Continuous>  pantoprazole   Suspension 40 milliGRAM(s) Oral daily  piperacillin/tazobactam IVPB.. 3.375 Gram(s) IV Intermittent every 8 hours  polyethylene glycol 3350 17 Gram(s) Oral two times a day  povidone iodine 10% Ointment 1 Application(s) Topical every 12 hours  senna 2 Tablet(s) Oral at bedtime  silver sulfADIAZINE 1% Cream 1 Application(s) Topical daily  simethicone 80 milliGRAM(s) Chew every 6 hours  sodium chloride 0.9% lock flush 10 milliLiter(s) IV Push every 1 hour PRN  sucralfate 1 Gram(s) Oral four times a day  tiotropium 18 MICROgram(s) Capsule 1 Capsule(s) Inhalation daily      Review of Systems:  unable to obtain    Allergies: codeine (Hives)    For details regarding the patient's past medical history, social history, family history, and other miscellaneous elements, please refer the initial infectious diseases consultation and/or the admitting history and physical examination for this admission.    Objective:  Vital Signs Last 24 Hrs  T(C): 37.1 (24 Apr 2022 12:51), Max: 37.7 (24 Apr 2022 03:41)  T(F): 98.7 (24 Apr 2022 12:51), Max: 99.8 (24 Apr 2022 03:41)  HR: 100 (24 Apr 2022 13:47) (79 - 112)  BP: 94/83 (24 Apr 2022 13:00) (90/75 - 133/101)  BP(mean): 88 (24 Apr 2022 13:00) (64 - 111)  RR: 20 (24 Apr 2022 13:00) (18 - 36)  SpO2: 94% (24 Apr 2022 13:47) (93% - 100%)    Physical Examination:  General: no acute distress, nontoxic appearing  HEENT: anicteric  Cardio: S1, S2 present, tachycardic  Resp: tracheostomy, on vent  Abd: distended  Ext: edema  Skin: warm, dry, no visible rash       Laboratory Studies:                        7.5    9.33  )-----------( 218      ( 24 Apr 2022 05:50 )             24.8   04-24    133<L>  |  99  |  43<H>  ----------------------------<  208<H>  4.3   |  28  |  1.15    Ca    8.8      24 Apr 2022 05:50  Phos  2.6     04-23  Mg     2.4     04-23    TPro  6.7  /  Alb  1.7<L>  /  TBili  0.5  /  DBili  x   /  AST  20  /  ALT  21  /  AlkPhos  197<H>  04-24    Microbiology: reviewed      Culture - Sputum (collected 24 Apr 2022 12:35)  Source: .Sputum Sputum  Gram Stain (24 Apr 2022 15:10):    Few Squamous epithelial cells per low power field    Moderate polymorphonuclear leukocytes per low power field    Moderate Gram Negative Rods per oil power field    Culture - Urine (collected 21 Apr 2022 18:36)  Source: Clean Catch Clean Catch (Midstream)  Final Report (24 Apr 2022 08:46):    >100,000 CFU/ml Escherichia coli ESBL  Organism: Escherichia coli ESBL (24 Apr 2022 08:46)  Organism: Escherichia coli ESBL (24 Apr 2022 08:46)    Culture - Blood (collected 21 Apr 2022 18:24)  Source: .Blood Blood-Peripheral  Preliminary Report (22 Apr 2022 19:01):    No growth to date.    Culture - Blood (collected 21 Apr 2022 18:24)  Source: .Blood Blood-Peripheral  Preliminary Report (22 Apr 2022 19:01):    No growth to date.    Radiology: reviewed

## 2022-04-24 NOTE — PROGRESS NOTE ADULT - ASSESSMENT
77 y/o F with PMH of HTN, DM type 2, CVA, recent Cardiac Arrest, Chronic Respiratory Failure, Vent Dependant s/p trach & PEG, Anemia with GI blood loss, and Dementia who was sent from [facility] facility for acute respiratory distress, fevers, hypotension. Patient unable to contribute to hx due to mental status.  In the ED: wbc 14.15, HGB 6.0, INR 1.40, sodium 134, cr 1.36, lactate 2.8. UA sig for mod LE, large blood, wbc, bacteria. Given Zosyn and ns.     RECOMMENDATIONS    Cx from 4/21 reviewed:  f/u blood cx-NGTD  f/u urine culture- ESBL E.Coli s to zosyn  f/u sputum culture- pending    Diarrhea:  C. diff indeterminate can repeat if persistent diarrhea    C/w zosyn for now    Infectious Diseases will continue to follow. Please call with any questions.   Andressa Brantley M.D.  [facility], Division of Infectious Diseases [phone]

## 2022-04-25 LAB
ALBUMIN SERPL ELPH-MCNC: 1.7 G/DL — LOW (ref 3.3–5)
ALP SERPL-CCNC: 209 U/L — HIGH (ref 30–120)
ALT FLD-CCNC: 20 U/L DA — SIGNIFICANT CHANGE UP (ref 10–60)
ANION GAP SERPL CALC-SCNC: 9 MMOL/L — SIGNIFICANT CHANGE UP (ref 5–17)
AST SERPL-CCNC: 16 U/L — SIGNIFICANT CHANGE UP (ref 10–40)
BILIRUB SERPL-MCNC: 0.5 MG/DL — SIGNIFICANT CHANGE UP (ref 0.2–1.2)
BUN SERPL-MCNC: 46 MG/DL — HIGH (ref 7–23)
CALCIUM SERPL-MCNC: 8.6 MG/DL — SIGNIFICANT CHANGE UP (ref 8.4–10.5)
CHLORIDE SERPL-SCNC: 103 MMOL/L — SIGNIFICANT CHANGE UP (ref 96–108)
CO2 SERPL-SCNC: 27 MMOL/L — SIGNIFICANT CHANGE UP (ref 22–31)
CREAT SERPL-MCNC: 1.12 MG/DL — SIGNIFICANT CHANGE UP (ref 0.5–1.3)
CULTURE RESULTS: SIGNIFICANT CHANGE UP
EGFR: 50 ML/MIN/1.73M2 — LOW
GLUCOSE BLDC GLUCOMTR-MCNC: 193 MG/DL — HIGH (ref 70–99)
GLUCOSE BLDC GLUCOMTR-MCNC: 220 MG/DL — HIGH (ref 70–99)
GLUCOSE BLDC GLUCOMTR-MCNC: 233 MG/DL — HIGH (ref 70–99)
GLUCOSE BLDC GLUCOMTR-MCNC: 250 MG/DL — HIGH (ref 70–99)
GLUCOSE BLDC GLUCOMTR-MCNC: 257 MG/DL — HIGH (ref 70–99)
GLUCOSE SERPL-MCNC: 211 MG/DL — HIGH (ref 70–99)
HCT VFR BLD CALC: 23.7 % — LOW (ref 34.5–45)
HGB BLD-MCNC: 7.1 G/DL — LOW (ref 11.5–15.5)
MCHC RBC-ENTMCNC: 24.1 PG — LOW (ref 27–34)
MCHC RBC-ENTMCNC: 30 GM/DL — LOW (ref 32–36)
MCV RBC AUTO: 80.3 FL — SIGNIFICANT CHANGE UP (ref 80–100)
NRBC # BLD: 0 /100 WBCS — SIGNIFICANT CHANGE UP (ref 0–0)
PLATELET # BLD AUTO: 220 K/UL — SIGNIFICANT CHANGE UP (ref 150–400)
POTASSIUM SERPL-MCNC: 4.3 MMOL/L — SIGNIFICANT CHANGE UP (ref 3.5–5.3)
POTASSIUM SERPL-SCNC: 4.3 MMOL/L — SIGNIFICANT CHANGE UP (ref 3.5–5.3)
PROT SERPL-MCNC: 6.6 G/DL — SIGNIFICANT CHANGE UP (ref 6–8.3)
RBC # BLD: 2.95 M/UL — LOW (ref 3.8–5.2)
RBC # FLD: 19.4 % — HIGH (ref 10.3–14.5)
SODIUM SERPL-SCNC: 139 MMOL/L — SIGNIFICANT CHANGE UP (ref 135–145)
SPECIMEN SOURCE: SIGNIFICANT CHANGE UP
WBC # BLD: 6.81 K/UL — SIGNIFICANT CHANGE UP (ref 3.8–10.5)
WBC # FLD AUTO: 6.81 K/UL — SIGNIFICANT CHANGE UP (ref 3.8–10.5)

## 2022-04-25 PROCEDURE — 99232 SBSQ HOSP IP/OBS MODERATE 35: CPT

## 2022-04-25 PROCEDURE — 99233 SBSQ HOSP IP/OBS HIGH 50: CPT

## 2022-04-25 RX ORDER — COLLAGENASE CLOSTRIDIUM HIST. 250 UNIT/G
1 OINTMENT (GRAM) TOPICAL DAILY
Refills: 0 | Status: DISCONTINUED | OUTPATIENT
Start: 2022-04-25 | End: 2022-04-29

## 2022-04-25 RX ADMIN — Medication 1 MILLIGRAM(S): at 03:53

## 2022-04-25 RX ADMIN — Medication 4: at 11:24

## 2022-04-25 RX ADMIN — PIPERACILLIN AND TAZOBACTAM 25 GRAM(S): 4; .5 INJECTION, POWDER, LYOPHILIZED, FOR SOLUTION INTRAVENOUS at 12:01

## 2022-04-25 RX ADMIN — Medication 1 APPLICATION(S): at 17:01

## 2022-04-25 RX ADMIN — Medication 1 TABLET(S): at 05:27

## 2022-04-25 RX ADMIN — Medication 1 APPLICATION(S): at 11:15

## 2022-04-25 RX ADMIN — Medication 1 MILLIGRAM(S): at 01:28

## 2022-04-25 RX ADMIN — SODIUM CHLORIDE 1 GRAM(S): 9 INJECTION INTRAMUSCULAR; INTRAVENOUS; SUBCUTANEOUS at 05:27

## 2022-04-25 RX ADMIN — Medication 1 MILLIGRAM(S): at 21:10

## 2022-04-25 RX ADMIN — PANTOPRAZOLE SODIUM 40 MILLIGRAM(S): 20 TABLET, DELAYED RELEASE ORAL at 11:13

## 2022-04-25 RX ADMIN — Medication 1 MILLIGRAM(S): at 07:34

## 2022-04-25 RX ADMIN — Medication 1 MILLIGRAM(S): at 11:13

## 2022-04-25 RX ADMIN — METHOCARBAMOL 500 MILLIGRAM(S): 500 TABLET, FILM COATED ORAL at 17:00

## 2022-04-25 RX ADMIN — SIMETHICONE 80 MILLIGRAM(S): 80 TABLET, CHEWABLE ORAL at 11:13

## 2022-04-25 RX ADMIN — Medication 1 MILLIGRAM(S): at 15:24

## 2022-04-25 RX ADMIN — SIMETHICONE 80 MILLIGRAM(S): 80 TABLET, CHEWABLE ORAL at 23:25

## 2022-04-25 RX ADMIN — HEPARIN SODIUM 5000 UNIT(S): 5000 INJECTION INTRAVENOUS; SUBCUTANEOUS at 17:00

## 2022-04-25 RX ADMIN — CHLORHEXIDINE GLUCONATE 15 MILLILITER(S): 213 SOLUTION TOPICAL at 17:00

## 2022-04-25 RX ADMIN — CHLORHEXIDINE GLUCONATE 1 APPLICATION(S): 213 SOLUTION TOPICAL at 11:13

## 2022-04-25 RX ADMIN — SIMETHICONE 80 MILLIGRAM(S): 80 TABLET, CHEWABLE ORAL at 17:00

## 2022-04-25 RX ADMIN — Medication 4: at 17:13

## 2022-04-25 RX ADMIN — Medication 1 GRAM(S): at 05:27

## 2022-04-25 RX ADMIN — CHLORHEXIDINE GLUCONATE 15 MILLILITER(S): 213 SOLUTION TOPICAL at 05:24

## 2022-04-25 RX ADMIN — PIPERACILLIN AND TAZOBACTAM 25 GRAM(S): 4; .5 INJECTION, POWDER, LYOPHILIZED, FOR SOLUTION INTRAVENOUS at 21:10

## 2022-04-25 RX ADMIN — Medication 1 GRAM(S): at 23:26

## 2022-04-25 RX ADMIN — Medication 1 GRAM(S): at 17:00

## 2022-04-25 RX ADMIN — Medication 1 MILLIGRAM(S): at 13:12

## 2022-04-25 RX ADMIN — Medication 1 GRAM(S): at 11:13

## 2022-04-25 RX ADMIN — Medication 1 MILLIGRAM(S): at 11:12

## 2022-04-25 RX ADMIN — Medication 4: at 00:00

## 2022-04-25 RX ADMIN — Medication 1 MILLIGRAM(S): at 23:26

## 2022-04-25 RX ADMIN — Medication 1 TABLET(S): at 17:00

## 2022-04-25 RX ADMIN — Medication 1 APPLICATION(S): at 05:28

## 2022-04-25 RX ADMIN — PIPERACILLIN AND TAZOBACTAM 25 GRAM(S): 4; .5 INJECTION, POWDER, LYOPHILIZED, FOR SOLUTION INTRAVENOUS at 04:00

## 2022-04-25 RX ADMIN — HEPARIN SODIUM 5000 UNIT(S): 5000 INJECTION INTRAVENOUS; SUBCUTANEOUS at 05:25

## 2022-04-25 RX ADMIN — Medication 1 APPLICATION(S): at 11:14

## 2022-04-25 RX ADMIN — METHOCARBAMOL 500 MILLIGRAM(S): 500 TABLET, FILM COATED ORAL at 05:27

## 2022-04-25 RX ADMIN — SIMETHICONE 80 MILLIGRAM(S): 80 TABLET, CHEWABLE ORAL at 05:27

## 2022-04-25 RX ADMIN — Medication 2: at 05:26

## 2022-04-25 RX ADMIN — Medication 6: at 23:26

## 2022-04-25 NOTE — PROGRESS NOTE ADULT - SUBJECTIVE AND OBJECTIVE BOX
Neurology follow up note    BREA [id number]      Interval History:    Patient resting in bed     Allergies    codeine (Hives)    Intolerances        MEDICATIONS    acetaminophen     Tablet .. 650 milliGRAM(s) Oral every 6 hours PRN  ALBUTerol    90 MICROgram(s) HFA Inhaler 2 Puff(s) Inhalation every 6 hours PRN  chlorhexidine 0.12% Liquid 15 milliLiter(s) Oral Mucosa every 12 hours  chlorhexidine 2% Cloths 1 Application(s) Topical daily  dextrose 5%. 1000 milliLiter(s) IV Continuous <Continuous>  dextrose 5%. 1000 milliLiter(s) IV Continuous <Continuous>  dextrose 50% Injectable 25 Gram(s) IV Push once  dextrose 50% Injectable 12.5 Gram(s) IV Push once  dextrose 50% Injectable 25 Gram(s) IV Push once  dextrose Oral Gel 15 Gram(s) Oral once PRN  folic acid 1 milliGRAM(s) Oral daily  glucagon  Injectable 1 milliGRAM(s) IntraMuscular once  heparin   Injectable 5000 Unit(s) SubCutaneous every 12 hours  insulin lispro (ADMELOG) corrective regimen sliding scale   SubCutaneous every 6 hours  lactobacillus acidophilus 1 Tablet(s) Oral two times a day  LORazepam     Tablet 1 milliGRAM(s) Oral every 4 hours  LORazepam   Injectable 1 milliGRAM(s) IV Push every 6 hours PRN  methocarbamol 500 milliGRAM(s) Oral two times a day  mineral oil/petrolatum Hydrophilic Ointment 1 Application(s) Topical daily  pantoprazole   Suspension 40 milliGRAM(s) Oral daily  piperacillin/tazobactam IVPB.. 3.375 Gram(s) IV Intermittent every 8 hours  polyethylene glycol 3350 17 Gram(s) Oral two times a day  povidone iodine 10% Ointment 1 Application(s) Topical every 12 hours  senna 2 Tablet(s) Oral at bedtime  silver sulfADIAZINE 1% Cream 1 Application(s) Topical daily  simethicone 80 milliGRAM(s) Chew every 6 hours  sodium chloride 0.9% lock flush 10 milliLiter(s) IV Push every 1 hour PRN  sucralfate 1 Gram(s) Oral four times a day              Vital Signs Last 24 Hrs  T(C): 37 (25 Apr 2022 06:00), Max: 37.9 (24 Apr 2022 20:00)  T(F): 98.6 (25 Apr 2022 06:00), Max: 100.3 (24 Apr 2022 20:00)  HR: 105 (25 Apr 2022 12:00) (82 - 111)  BP: 123/55 (25 Apr 2022 12:00) (85/59 - 144/67)  BP(mean): 74 (25 Apr 2022 12:00) (67 - 111)  RR: 27 (25 Apr 2022 12:00) (16 - 42)  SpO2: 96% (25 Apr 2022 12:00) (93% - 100%)      REVIEW OF SYSTEMS:  Cannot be obtained secondary to the patient being nonverbal.    PHYSICAL EXAMINATION:    HEENT:  Head:  Normocephalic.  Eyes:  No scleral icterus.  Ears:  Unable to evaluate with the patient being nonverbal and unresponsive.  NECK:  Tracheostomy was in place.  RESPIRATORY: Decreased breath sounds bilaterally.  ABDOMEN: Soft and nontender.  EXTREMITIES:  No clubbing or cyanosis was noted.      NEUROLOGIC:  No response to verbal stimuli.  I opened the patient's eyes, no blink to visual threat.  To painful stimuli, slight withdrawal bilateral upper and lower.  Motor:  Bilateral upper extremity was in flexed position.  Bilateral lower extremities were straight.  Upon stimulation, the patient would have subtle shaking.                         LABS:  CBC Full  -  ( 25 Apr 2022 05:58 )  WBC Count : 6.81 K/uL  RBC Count : 2.95 M/uL  Hemoglobin : 7.1 g/dL  Hematocrit : 23.7 %  Platelet Count - Automated : 220 K/uL  Mean Cell Volume : 80.3 fl  Mean Cell Hemoglobin : 24.1 pg  Mean Cell Hemoglobin Concentration : 30.0 gm/dL  Auto Neutrophil # : x  Auto Lymphocyte # : x  Auto Monocyte # : x  Auto Eosinophil # : x  Auto Basophil # : x  Auto Neutrophil % : x  Auto Lymphocyte % : x  Auto Monocyte % : x  Auto Eosinophil % : x  Auto Basophil % : x      04-25    139  |  103  |  46<H>  ----------------------------<  211<H>  4.3   |  27  |  1.12    Ca    8.6      25 Apr 2022 05:58    TPro  6.6  /  Alb  1.7<L>  /  TBili  0.5  /  DBili  x   /  AST  16  /  ALT  20  /  AlkPhos  209<H>  04-25    Hemoglobin A1C:     LIVER FUNCTIONS - ( 25 Apr 2022 05:58 )  Alb: 1.7 g/dL / Pro: 6.6 g/dL / ALK PHOS: 209 U/L / ALT: 20 U/L DA / AST: 16 U/L / GGT: x           Vitamin B12         RADIOLOGY    ANALYSIS AND PLAN:  This is a 76-year-old with altered mental status and history of abnormal movements.  For altered mental status, most likely metabolic encephalopathy secondary to underlying pneumonia-type process along with signs of dehydration from SMA-7.  Antibiotics as needed.  Monitor renal function.  For abnormal movements, the patient has been evaluated multiple times in the past, as per my conversation with spouse, these were deemed nonepileptiform, at present we will refrain from any antiseizure medication and had multiple conversations in the past with spouse, he does not want any.  For history of spasms, continue the patient on muscle relaxants.  For history of diabetes, strict control of blood sugars.  If possible, please maintain a systolic blood pressure above 100 to help maintain cerebral perfusion.    The patient's spouse's name is Marciano, telephone number is [phone].    Greater than 31 minutes of time spent with the patient, planning care, reviewing data, and speaking to multidisciplinary healthcare team with greater than 50% of that time in counseling and care coordination.

## 2022-04-25 NOTE — PROGRESS NOTE ADULT - ASSESSMENT
76F Chronic Respiratory Failure, HTN, DM2, Dementia, admitted for Septic Shock.     Acute on Chronic Hypoxic Respiratory Failure /  Septic Shock   Imaging shows inflitrates and pleural effusions; UC showing ESBL UTI; C. Diff studies are indeterminate  IV Zosyn and ID on board   Off Pressors and hemodynamics improved; On Vent via Trach   Arterial Line removed    Acute Blood Loss Anemia   S/P 1U PRBC Transfusion and started on Folic Acid   Has Anemia of Chronic Disease   Hematology consult noted     Acute Renal Failure  Improved and from sepsis   Monitor BMP and Electrolytes     Diabetes type II  Insulin Corrective Scale  Finger sticks per routine (have been in the low 100s);   Hypoglycemia protocol    Quadriplegia  c/w nursing care for all ADLs    COPD  c/w inhalers   pulm (Jaime), recs appreciated    GERD  c/w carafate and PPI     Diet  Tube Feeds    Disposition  Discharge planning pending hospital course

## 2022-04-25 NOTE — PROGRESS NOTE ADULT - ASSESSMENT
75 y/o F with PMH of HTN, DM type 2, CVA, recent Cardiac Arrest, Chronic Respiratory Failure, Vent Dependant s/p trach & PEG, Anemia with GI blood loss, and Dementia who was sent from [facility] facility for acute respiratory distress, fevers, hypotension. Patient unable to contribute to hx due to mental status.  In the ED: wbc 14.15, HGB 6.0, INR 1.40, sodium 134, cr 1.36, lactate 2.8. UA sig for mod LE, large blood, wbc, bacteria. Given Zosyn and ns.     RECOMMENDATIONS  Cx from 4/21 reviewed:  sputum cx noted, f/u pseudomonas and serratia sensitivities  urine cx w/ ESBL E Coli but with low PRATIK to zosyn and zosyn concentrates in the urine  leukocytosis resolved; off pressors  C/w zosyn for now  further recs pending above sensitivities  f/u blood cx-NGTD    Diarrhea:  C. diff indeterminate can repeat if persistent diarrhea    Emil Medellin M.D.  [facility], Division of Infectious Diseases  [phone]  After 5pm on weekdays and all day on weekends - please call [phone]

## 2022-04-25 NOTE — PROGRESS NOTE ADULT - SUBJECTIVE AND OBJECTIVE BOX
BREA BECKHAM    [facility]    Allergies    codeine (Hives)    Intolerances        PAST MEDICAL & SURGICAL HISTORY:  Dementia of frontal lobe type    Aphasic stroke    Diabetes mellitus    Respiratory failure    Hypertension    GERD (gastroesophageal reflux disease)    Constipation    Respiratory failure    CVA (cerebral vascular accident)    HTN (hypertension)    DM (diabetes mellitus)    Advanced dementia    COVID-19 virus detected    Quadriplegia    Pneumonia    Type II diabetes mellitus    Hx of appendectomy    Gastrostomy in place    Tracheostomy in place    Tracheostomy tube present    Feeding by G-tube        FAMILY HISTORY:  No pertinent family history in first degree relatives        Home Medications:  albuterol 90 mcg/inh inhalation aerosol with adapter: 2  inhaled every 6 hours (21 Apr 2022 14:01)  Bacid (LAC) oral tablet: 2 tab(s) by gastrostomy tube once a day (21 Apr 2022 13:45)  Basaglar KwikPen 100 units/mL subcutaneous solution: 36 unit(s) subcutaneous once a day (at bedtime) (21 Apr 2022 14:01)  Betadine 10% topical swab: cleanse right and left great toes (21 Apr 2022 14:01)  Carafate 1 g/10 mL oral suspension: 10 milliliter(s) by gastrostomy tube 4 times a day (before meals and at bedtime) for 14 days (Started 6/4/21) (21 Apr 2022 13:45)  chlorhexidine 0.12% mucous membrane liquid: 15 milliliter(s) mucous membrane 2 times a day (21 Apr 2022 13:45)  Eucerin topical cream: Apply topically to affected area once a day bilateral feet (21 Apr 2022 14:01)  ipratropium-albuterol 0.5 mg-2.5 mg/3 mL inhalation solution: 3 milliliter(s) inhaled 4 times a day (21 Apr 2022 13:45)  LORazepam 1 mg oral tablet: 1 tab(s) by gastrostomy tube every 4 hours (21 Apr 2022 14:01)  methocarbamol 500 mg oral tablet: 1 tab(s) by gastrostomy tube 2 times a day (21 Apr 2022 14:01)  pantoprazole: 20 milliliter(s) by gastrostomy tube 2 times a day (21 Apr 2022 14:01)  polyethylene glycol 3350 oral powder for reconstitution: 17 gram(s) by gastrostomy tube every 12 hours (21 Apr 2022 14:01)  senna 8.6 mg oral tablet: 3 tab(s) by gastrostomy tube once a day (at bedtime) (21 Apr 2022 13:45)  silver sulfADIAZINE 1% topical cream: Apply topically to affected area once a day to sacrum (21 Apr 2022 14:01)  simethicone 80 mg oral tablet, chewable: 1 tab(s) by gastrostomy tube every 6 hours (21 Apr 2022 14:01)  Tylenol 325 mg oral tablet: 2 tab(s) by gastrostomy tube once a day; 60 minutes prior to dressing change  (21 Apr 2022 13:45)  Tylenol 325 mg oral tablet: 2 tab(s) by gastrostomy tube every 6 hours, As Needed (21 Apr 2022 14:01)      MEDICATIONS  (STANDING):  chlorhexidine 0.12% Liquid 15 milliLiter(s) Oral Mucosa every 12 hours  chlorhexidine 2% Cloths 1 Application(s) Topical daily  dextrose 5%. 1000 milliLiter(s) (50 mL/Hr) IV Continuous <Continuous>  dextrose 5%. 1000 milliLiter(s) (100 mL/Hr) IV Continuous <Continuous>  dextrose 50% Injectable 25 Gram(s) IV Push once  dextrose 50% Injectable 12.5 Gram(s) IV Push once  dextrose 50% Injectable 25 Gram(s) IV Push once  folic acid 1 milliGRAM(s) Oral daily  glucagon  Injectable 1 milliGRAM(s) IntraMuscular once  heparin   Injectable 5000 Unit(s) SubCutaneous every 12 hours  insulin lispro (ADMELOG) corrective regimen sliding scale   SubCutaneous every 6 hours  lactobacillus acidophilus 1 Tablet(s) Oral two times a day  LORazepam     Tablet 1 milliGRAM(s) Oral every 4 hours  methocarbamol 500 milliGRAM(s) Oral two times a day  mineral oil/petrolatum Hydrophilic Ointment 1 Application(s) Topical daily  pantoprazole   Suspension 40 milliGRAM(s) Oral daily  piperacillin/tazobactam IVPB.. 3.375 Gram(s) IV Intermittent every 8 hours  polyethylene glycol 3350 17 Gram(s) Oral two times a day  povidone iodine 10% Ointment 1 Application(s) Topical every 12 hours  senna 2 Tablet(s) Oral at bedtime  silver sulfADIAZINE 1% Cream 1 Application(s) Topical daily  simethicone 80 milliGRAM(s) Chew every 6 hours  sucralfate 1 Gram(s) Oral four times a day    MEDICATIONS  (PRN):  acetaminophen     Tablet .. 650 milliGRAM(s) Oral every 6 hours PRN Temp greater or equal to 38C (100.4F)  ALBUTerol    90 MICROgram(s) HFA Inhaler 2 Puff(s) Inhalation every 6 hours PRN Shortness of Breath and/or Wheezing  dextrose Oral Gel 15 Gram(s) Oral once PRN Blood Glucose LESS THAN 70 milliGRAM(s)/deciliter  LORazepam   Injectable 1 milliGRAM(s) IV Push every 6 hours PRN Anxiety  sodium chloride 0.9% lock flush 10 milliLiter(s) IV Push every 1 hour PRN Pre/post blood products, medications, blood draw, and to maintain line patency      Diet, NPO with Tube Feed:   Tube Feeding Modality: Gastrostomy  Glucerna 1.5 Horacio  Total Volume for 24 Hours (mL): 1200  Continuous  Starting Tube Feed Rate mL per Hour: 20  Increase Tube Feed Rate by (mL): 10     Every 6 hours  Until Goal Tube Feed Rate (mL per Hour): 60  Tube Feed Duration (in Hours): 20  Tube Feed Start Time: 00:00  Free Water Flush  Pump   Rate (mL per Hour): 30   Frequency: Every Hour    Duration (Hours): 24 (04-22-22 @ 12:49) [Active]          Vital Signs Last 24 Hrs  T(C): 37 (25 Apr 2022 06:00), Max: 37.9 (24 Apr 2022 20:00)  T(F): 98.6 (25 Apr 2022 06:00), Max: 100.3 (24 Apr 2022 20:00)  HR: 96 (25 Apr 2022 07:00) (82 - 111)  BP: 102/69 (25 Apr 2022 07:00) (85/59 - 144/67)  BP(mean): 81 (25 Apr 2022 07:00) (67 - 89)  RR: 16 (25 Apr 2022 07:00) (16 - 42)  SpO2: 98% (25 Apr 2022 07:00) (93% - 100%)      04-24-22 @ 07:01  -  04-25-22 @ 07:00  --------------------------------------------------------  IN: 2070 mL / OUT: 1800 mL / NET: 270 mL    04-25-22 @ 07:01  -  04-25-22 @ 09:39  --------------------------------------------------------  IN: 170 mL / OUT: 0 mL / NET: 170 mL        Mode: AC/ CMV (Assist Control/ Continuous Mandatory Ventilation), RR (machine): 18, TV (machine): 400, FiO2: 40, PEEP: 5, ITime: 1, MAP: 15, PIP: 38      LABS:                        7.1    6.81  )-----------( 220      ( 25 Apr 2022 05:58 )             23.7     04-25    139  |  103  |  46<H>  ----------------------------<  211<H>  4.3   |  27  |  1.12    Ca    8.6      25 Apr 2022 05:58    TPro  6.6  /  Alb  1.7<L>  /  TBili  0.5  /  DBili  x   /  AST  16  /  ALT  20  /  AlkPhos  209<H>  04-25              WBC:  WBC Count: 6.81 K/uL (04-25 @ 05:58)  WBC Count: 9.33 K/uL (04-24 @ 05:50)  WBC Count: 11.35 K/uL (04-23 @ 06:07)  WBC Count: 15.47 K/uL (04-22 @ 06:58)  WBC Count: 14.93 K/uL (04-21 @ 22:35)  WBC Count: 14.15 K/uL (04-21 @ 13:04)      MICROBIOLOGY:  RECENT CULTURES:  04-24 .Sputum Sputum XXXX   Few Squamous epithelial cells per low power field  Moderate polymorphonuclear leukocytes per low power field  Moderate Gram Negative Rods per oil power field XXXX    04-22 Skin Skin XXXX XXXX   Numerous Klebsiella pneumoniae    04-21 Clean Catch Clean Catch (Midstream) Escherichia coli ESBL XXXX   >100,000 CFU/ml Escherichia coli ESBL    04-21 .Blood Blood-Peripheral XXXX XXXX   No growth to date.                    Sodium:  Sodium, Serum: 139 mmol/L (04-25 @ 05:58)  Sodium, Serum: 133 mmol/L (04-24 @ 05:50)  Sodium, Serum: 131 mmol/L (04-23 @ 06:07)  Sodium, Serum: 133 mmol/L (04-22 @ 06:58)  Sodium, Serum: 133 mmol/L (04-21 @ 22:35)  Sodium, Serum: 134 mmol/L (04-21 @ 13:04)      1.12 mg/dL 04-25 @ 05:58  1.15 mg/dL 04-24 @ 05:50  1.20 mg/dL 04-23 @ 06:07  1.10 mg/dL 04-22 @ 06:58  1.29 mg/dL 04-21 @ 22:35  1.36 mg/dL 04-21 @ 13:04      Hemoglobin:  Hemoglobin: 7.1 g/dL (04-25 @ 05:58)  Hemoglobin: 7.5 g/dL (04-24 @ 05:50)  Hemoglobin: 7.7 g/dL (04-23 @ 06:07)  Hemoglobin: 8.4 g/dL (04-22 @ 06:58)  Hemoglobin: 8.3 g/dL (04-21 @ 22:35)  Hemoglobin: 6.0 g/dL (04-21 @ 13:04)      Platelets: Platelet Count - Automated: 220 K/uL (04-25 @ 05:58)  Platelet Count - Automated: 218 K/uL (04-24 @ 05:50)  Platelet Count - Automated: 210 K/uL (04-23 @ 06:07)  Platelet Count - Automated: 188 K/uL (04-22 @ 06:58)  Platelet Count - Automated: 170 K/uL (04-21 @ 22:35)  Platelet Count - Automated: 176 K/uL (04-21 @ 13:04)      LIVER FUNCTIONS - ( 25 Apr 2022 05:58 )  Alb: 1.7 g/dL / Pro: 6.6 g/dL / ALK PHOS: 209 U/L / ALT: 20 U/L DA / AST: 16 U/L / GGT: x                 RADIOLOGY & ADDITIONAL STUDIES:      MICROBIOLOGY:  RECENT CULTURES:  04-24 .Sputum Sputum XXXX   Few Squamous epithelial cells per low power field  Moderate polymorphonuclear leukocytes per low power field  Moderate Gram Negative Rods per oil power field XXXX    04-22 Skin Skin XXXX XXXX   Numerous Klebsiella pneumoniae    04-21 Clean Catch Clean Catch (Midstream) Escherichia coli ESBL XXXX   >100,000 CFU/ml Escherichia coli ESBL    04-21 .Blood Blood-Peripheral XXXX XXXX   No growth to date.

## 2022-04-25 NOTE — CONSULT NOTE ADULT - SUBJECTIVE AND OBJECTIVE BOX
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 Patient unable to contribute to hx due to mental status.    In the ED: wbc 14.15, HGB 6.0, INR 1.40, sodium 134, cr 1.36, lactate 2.8. UA sig for mod LE, large blood, wbc, bacteria. Given Zosyn and ns.  (21 Apr 2022 14:02)      PAST MEDICAL & SURGICAL HISTORY:  Dementia of frontal lobe type    Aphasic stroke    Diabetes mellitus    Respiratory failure    Hypertension    GERD (gastroesophageal reflux disease)    Constipation    Respiratory failure    CVA (cerebral vascular accident)    HTN (hypertension)    DM (diabetes mellitus)    Advanced dementia    COVID-19 virus detected    Quadriplegia    Pneumonia    Type II diabetes mellitus    Hx of appendectomy    Gastrostomy in place    Tracheostomy in place    Tracheostomy tube present    Feeding by G-tube        REVIEW OF SYSTEMS      General:	    Skin/Breast:  	  Ophthalmologic:  	  ENMT:	    Respiratory and Thorax:  	  Cardiovascular:	    Gastrointestinal:	    Genitourinary:	    Musculoskeletal:	    Neurological:	    Psychiatric:	    Hematology/Lymphatics:	    Endocrine:	    Allergic/Immunologic:	  >>> <<<    REVIEW OF SYSTEMS  CONSTITUTIONAL: +weakness, no fevers or chills  HEENT: denies blurred visions, sore throat  SKIN: denies new lesions, rash  CARDIOVASCULAR: no palpitations  RESPIRATORY: denies shortness of breath, sputum production  GASTROINTESTINAL: denies nausea, vomiting, diarrhea, abdominal pain  all other ROS is negative unless indicated above  Unable to obtain ROS  2/2 non-verbal status  Patient is unable to provide any information/ROS  due to baseline mental status      MEDICATIONS  (STANDING):  chlorhexidine 0.12% Liquid 15 milliLiter(s) Oral Mucosa every 12 hours  chlorhexidine 2% Cloths 1 Application(s) Topical daily  dextrose 5%. 1000 milliLiter(s) (50 mL/Hr) IV Continuous <Continuous>  dextrose 5%. 1000 milliLiter(s) (100 mL/Hr) IV Continuous <Continuous>  dextrose 50% Injectable 25 Gram(s) IV Push once  dextrose 50% Injectable 12.5 Gram(s) IV Push once  dextrose 50% Injectable 25 Gram(s) IV Push once  folic acid 1 milliGRAM(s) Oral daily  glucagon  Injectable 1 milliGRAM(s) IntraMuscular once  heparin   Injectable 5000 Unit(s) SubCutaneous every 12 hours  insulin lispro (ADMELOG) corrective regimen sliding scale   SubCutaneous every 6 hours  lactobacillus acidophilus 1 Tablet(s) Oral two times a day  LORazepam     Tablet 1 milliGRAM(s) Oral every 4 hours  methocarbamol 500 milliGRAM(s) Oral two times a day  mineral oil/petrolatum Hydrophilic Ointment 1 Application(s) Topical daily  pantoprazole   Suspension 40 milliGRAM(s) Oral daily  piperacillin/tazobactam IVPB.. 3.375 Gram(s) IV Intermittent every 8 hours  polyethylene glycol 3350 17 Gram(s) Oral two times a day  povidone iodine 10% Ointment 1 Application(s) Topical every 12 hours  senna 2 Tablet(s) Oral at bedtime  silver sulfADIAZINE 1% Cream 1 Application(s) Topical daily  simethicone 80 milliGRAM(s) Chew every 6 hours  sucralfate 1 Gram(s) Oral four times a day    MEDICATIONS  (PRN):  acetaminophen     Tablet .. 650 milliGRAM(s) Oral every 6 hours PRN Temp greater or equal to 38C (100.4F)  ALBUTerol    90 MICROgram(s) HFA Inhaler 2 Puff(s) Inhalation every 6 hours PRN Shortness of Breath and/or Wheezing  dextrose Oral Gel 15 Gram(s) Oral once PRN Blood Glucose LESS THAN 70 milliGRAM(s)/deciliter  LORazepam   Injectable 1 milliGRAM(s) IV Push every 6 hours PRN Anxiety  sodium chloride 0.9% lock flush 10 milliLiter(s) IV Push every 1 hour PRN Pre/post blood products, medications, blood draw, and to maintain line patency      Allergies    codeine (Hives)    Intolerances        SOCIAL HISTORY:  / single/ ; (+)HHA; Denies smoking, ETOH, drugs    FAMILY HISTORY:  No pertinent family history in first degree relatives        Vital Signs Last 24 Hrs  T(C): 37 (25 Apr 2022 06:00), Max: 37.9 (24 Apr 2022 20:00)  T(F): 98.6 (25 Apr 2022 06:00), Max: 100.3 (24 Apr 2022 20:00)  HR: 105 (25 Apr 2022 12:00) (82 - 111)  BP: 123/55 (25 Apr 2022 12:00) (85/59 - 144/67)  BP(mean): 74 (25 Apr 2022 12:00) (67 - 111)  RR: 27 (25 Apr 2022 12:00) (16 - 42)  SpO2: 96% (25 Apr 2022 12:00) (93% - 100%)    NAD / gaurded but stable,  A&Ox3/ Alert  cachectic/ MO/ Obese  within defined normal limits  Total Care Sport/ Versa Care P500 bed                            7.1    6.81  )-----------( 220      ( 25 Apr 2022 05:58 )             23.7     04-25    139  |  103  |  46<H>  ----------------------------<  211<H>  4.3   |  27  |  1.12    Ca    8.6      25 Apr 2022 05:58    TPro  6.6  /  Alb  1.7<L>  /  TBili  0.5  /  DBili  x   /  AST  16  /  ALT  20  /  AlkPhos  209<H>  04-25      A1C with Estimated Average Glucose Result: 6.4 % (04-22-22 @ 12:14)      PHYSICAL EXAM:    Constitutional: resting comfortably in bed; NAD  ENT: no nasal discharge; uvula midline, no oropharyngeal erythema or exudates  Neck: trach in place/vented  Respiratory: CTA  Cardiac: irregular rhythm; no murmurs  Gastrointestinal: soft, NT/ND; PEG in place, Right lower abdomen brownish discoloration from old hematoma?, fluid is felt in subcutaneous space, non tender, no erythema  Extremities: no clubbing or cyanosis; ++ Lower ext edema  Dermatologic: skin warm, dry and intact; no rashes, wounds, or scars  Lymphatic: no submandibular or cervical LAD  Neurologic: awake, alert, follows Can not follow commands, weakened strength & sensation grossly intact/ paraesthesia  Musculoskeletal/Vascular:  ROM  No edema, warm, no calf tenderness, no hair growth  DP/PT  hemosiderin staining  no deformities/ contractures  DP & PT pulses non-palpable bilaterally 2/2 +1 lower extremity pitting edema, Capillary refill 3 seconds, no hair growth, skin temp warm b/l.  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 Patient unable to contribute to hx due to mental status.      PAST MEDICAL & SURGICAL HISTORY:  Dementia of frontal lobe type    Aphasic stroke    Diabetes mellitus    Respiratory failure    Hypertension    GERD (gastroesophageal reflux disease)    Constipation    Respiratory failure    CVA (cerebral vascular accident)    HTN (hypertension)    DM (diabetes mellitus)    Advanced dementia    COVID-19 virus detected    Quadriplegia    Pneumonia    Type II diabetes mellitus    Hx of appendectomy    Gastrostomy in place    Tracheostomy in place    Tracheostomy tube present    Feeding by G-tube    REVIEW OF SYSTEMS  Patient is unable to provide any information/ROS  due to baseline mental status      MEDICATIONS  (STANDING):  chlorhexidine 0.12% Liquid 15 milliLiter(s) Oral Mucosa every 12 hours  chlorhexidine 2% Cloths 1 Application(s) Topical daily  dextrose 5%. 1000 milliLiter(s) (50 mL/Hr) IV Continuous <Continuous>  dextrose 5%. 1000 milliLiter(s) (100 mL/Hr) IV Continuous <Continuous>  dextrose 50% Injectable 25 Gram(s) IV Push once  dextrose 50% Injectable 12.5 Gram(s) IV Push once  dextrose 50% Injectable 25 Gram(s) IV Push once  folic acid 1 milliGRAM(s) Oral daily  glucagon  Injectable 1 milliGRAM(s) IntraMuscular once  heparin   Injectable 5000 Unit(s) SubCutaneous every 12 hours  insulin lispro (ADMELOG) corrective regimen sliding scale   SubCutaneous every 6 hours  lactobacillus acidophilus 1 Tablet(s) Oral two times a day  LORazepam     Tablet 1 milliGRAM(s) Oral every 4 hours  methocarbamol 500 milliGRAM(s) Oral two times a day  mineral oil/petrolatum Hydrophilic Ointment 1 Application(s) Topical daily  pantoprazole   Suspension 40 milliGRAM(s) Oral daily  piperacillin/tazobactam IVPB.. 3.375 Gram(s) IV Intermittent every 8 hours  polyethylene glycol 3350 17 Gram(s) Oral two times a day  povidone iodine 10% Ointment 1 Application(s) Topical every 12 hours  senna 2 Tablet(s) Oral at bedtime  silver sulfADIAZINE 1% Cream 1 Application(s) Topical daily  simethicone 80 milliGRAM(s) Chew every 6 hours  sucralfate 1 Gram(s) Oral four times a day    MEDICATIONS  (PRN):  acetaminophen     Tablet .. 650 milliGRAM(s) Oral every 6 hours PRN Temp greater or equal to 38C (100.4F)  ALBUTerol    90 MICROgram(s) HFA Inhaler 2 Puff(s) Inhalation every 6 hours PRN Shortness of Breath and/or Wheezing  dextrose Oral Gel 15 Gram(s) Oral once PRN Blood Glucose LESS THAN 70 milliGRAM(s)/deciliter  LORazepam   Injectable 1 milliGRAM(s) IV Push every 6 hours PRN Anxiety  sodium chloride 0.9% lock flush 10 milliLiter(s) IV Push every 1 hour PRN Pre/post blood products, medications, blood draw, and to maintain line patency      Allergies    codeine (Hives)    Intolerances        SOCIAL HISTORY:      FAMILY HISTORY:  No pertinent family history in first degree relatives        Vital Signs Last 24 Hrs  T(C): 37 (25 Apr 2022 06:00), Max: 37.9 (24 Apr 2022 20:00)  T(F): 98.6 (25 Apr 2022 06:00), Max: 100.3 (24 Apr 2022 20:00)  HR: 105 (25 Apr 2022 12:00) (82 - 111)  BP: 123/55 (25 Apr 2022 12:00) (85/59 - 144/67)  BP(mean): 74 (25 Apr 2022 12:00) (67 - 111)  RR: 27 (25 Apr 2022 12:00) (16 - 42)  SpO2: 96% (25 Apr 2022 12:00) (93% - 100%)    NAD / gaurded but stable,  A&Ox3/ Alert  cachectic/ MO/ Obese  within defined normal limits  Total Care Sport/ Versa Care P500 bed                            7.1    6.81  )-----------( 220      ( 25 Apr 2022 05:58 )             23.7     04-25    139  |  103  |  46<H>  ----------------------------<  211<H>  4.3   |  27  |  1.12    Ca    8.6      25 Apr 2022 05:58    TPro  6.6  /  Alb  1.7<L>  /  TBili  0.5  /  DBili  x   /  AST  16  /  ALT  20  /  AlkPhos  209<H>  04-25      A1C with Estimated Average Glucose Result: 6.4 % (04-22-22 @ 12:14)

## 2022-04-25 NOTE — PROGRESS NOTE ADULT - ASSESSMENT
The patient is a 76 year old female with a history of HTN, DM, CVA, dementia, chronic respiratory failure s/p trach, PEG, cardiac arrest, anemia who presented with respiratory distress, fevers, hypotension.    Plan:  - Echo 9/21 with normal LV systolic function, mod pulm HTN  - CT chest with bilateral infiltrates and pleural effusions  - Weaned off of norepinephrine  - Hemoglobin slowly trending down  - On zosyn  - Mechanical ventilation  - Overall prognosis remains poor

## 2022-04-25 NOTE — PROGRESS NOTE ADULT - SUBJECTIVE AND OBJECTIVE BOX
Date/Time Patient Seen:  		  Referring MD:   Data Reviewed	       Patient is a 76y old  Female who presents with a chief complaint of Anemia (24 Apr 2022 22:49)      Subjective/HPI     PAST MEDICAL & SURGICAL HISTORY:  Dementia of frontal lobe type    Aphasic stroke    Diabetes mellitus    Respiratory failure    Hypertension    GERD (gastroesophageal reflux disease)    Constipation    Respiratory failure    CVA (cerebral vascular accident)    HTN (hypertension)    DM (diabetes mellitus)    Advanced dementia    COVID-19 virus detected    Quadriplegia    Pneumonia    Type II diabetes mellitus    Hx of appendectomy    Gastrostomy in place    Tracheostomy in place    Tracheostomy tube present    Feeding by G-tube          Medication list         MEDICATIONS  (STANDING):  chlorhexidine 0.12% Liquid 15 milliLiter(s) Oral Mucosa every 12 hours  chlorhexidine 2% Cloths 1 Application(s) Topical daily  dextrose 5%. 1000 milliLiter(s) (50 mL/Hr) IV Continuous <Continuous>  dextrose 5%. 1000 milliLiter(s) (100 mL/Hr) IV Continuous <Continuous>  dextrose 50% Injectable 25 Gram(s) IV Push once  dextrose 50% Injectable 12.5 Gram(s) IV Push once  dextrose 50% Injectable 25 Gram(s) IV Push once  folic acid 1 milliGRAM(s) Oral daily  glucagon  Injectable 1 milliGRAM(s) IntraMuscular once  heparin   Injectable 5000 Unit(s) SubCutaneous every 12 hours  insulin lispro (ADMELOG) corrective regimen sliding scale   SubCutaneous every 6 hours  lactobacillus acidophilus 1 Tablet(s) Oral two times a day  LORazepam     Tablet 1 milliGRAM(s) Oral every 4 hours  methocarbamol 500 milliGRAM(s) Oral two times a day  mineral oil/petrolatum Hydrophilic Ointment 1 Application(s) Topical daily  pantoprazole   Suspension 40 milliGRAM(s) Oral daily  piperacillin/tazobactam IVPB.. 3.375 Gram(s) IV Intermittent every 8 hours  polyethylene glycol 3350 17 Gram(s) Oral two times a day  povidone iodine 10% Ointment 1 Application(s) Topical every 12 hours  senna 2 Tablet(s) Oral at bedtime  silver sulfADIAZINE 1% Cream 1 Application(s) Topical daily  simethicone 80 milliGRAM(s) Chew every 6 hours  sucralfate 1 Gram(s) Oral four times a day    MEDICATIONS  (PRN):  acetaminophen     Tablet .. 650 milliGRAM(s) Oral every 6 hours PRN Temp greater or equal to 38C (100.4F)  ALBUTerol    90 MICROgram(s) HFA Inhaler 2 Puff(s) Inhalation every 6 hours PRN Shortness of Breath and/or Wheezing  dextrose Oral Gel 15 Gram(s) Oral once PRN Blood Glucose LESS THAN 70 milliGRAM(s)/deciliter  LORazepam   Injectable 1 milliGRAM(s) IV Push every 6 hours PRN Anxiety  sodium chloride 0.9% lock flush 10 milliLiter(s) IV Push every 1 hour PRN Pre/post blood products, medications, blood draw, and to maintain line patency         Vitals log        ICU Vital Signs Last 24 Hrs  T(C): 37 (25 Apr 2022 06:00), Max: 37.9 (24 Apr 2022 20:00)  T(F): 98.6 (25 Apr 2022 06:00), Max: 100.3 (24 Apr 2022 20:00)  HR: 91 (25 Apr 2022 06:00) (82 - 111)  BP: 93/71 (25 Apr 2022 06:00) (85/59 - 144/67)  BP(mean): 79 (25 Apr 2022 06:00) (65 - 89)  ABP: 113/55 (25 Apr 2022 06:00) (95/53 - 163/88)  ABP(mean): 77 (25 Apr 2022 06:00) (66 - 117)  RR: 30 (25 Apr 2022 06:00) (18 - 42)  SpO2: 99% (25 Apr 2022 06:00) (93% - 100%)       Mode: AC/ CMV (Assist Control/ Continuous Mandatory Ventilation)  RR (machine): 18  TV (machine): 400  FiO2: 40  PEEP: 5  ITime: 1  MAP: 17  PIP: 27      Input and Output:  I&O's Detail    23 Apr 2022 07:01  -  24 Apr 2022 07:00  --------------------------------------------------------  IN:    Enteral Tube Flush: 600 mL    Free Water: 400 mL    Glucerna 1.5: 1260 mL    IV PiggyBack: 200 mL  Total IN: 2460 mL    OUT:    Indwelling Catheter - Urethral (mL): 1300 mL  Total OUT: 1300 mL    Total NET: 1160 mL      24 Apr 2022 07:01  -  25 Apr 2022 06:25  --------------------------------------------------------  IN:    Enteral Tube Flush: 525 mL    Glucerna 1.5: 1260 mL    IV PiggyBack: 200 mL  Total IN: 1985 mL    OUT:    Indwelling Catheter - Urethral (mL): 1800 mL  Total OUT: 1800 mL    Total NET: 185 mL          Lab Data                        7.1    6.81  )-----------( 220      ( 25 Apr 2022 05:58 )             23.7     04-24    133<L>  |  99  |  43<H>  ----------------------------<  208<H>  4.3   |  28  |  1.15    Ca    8.8      24 Apr 2022 05:50    TPro  6.7  /  Alb  1.7<L>  /  TBili  0.5  /  DBili  x   /  AST  20  /  ALT  21  /  AlkPhos  197<H>  04-24            Review of Systems	      Objective     Physical Examination    heart s1s2  lung dec BS  abd soft      Pertinent Lab findings & Imaging      Annalise:  NO   Adequate UO     I&O's Detail    23 Apr 2022 07:01  -  24 Apr 2022 07:00  --------------------------------------------------------  IN:    Enteral Tube Flush: 600 mL    Free Water: 400 mL    Glucerna 1.5: 1260 mL    IV PiggyBack: 200 mL  Total IN: 2460 mL    OUT:    Indwelling Catheter - Urethral (mL): 1300 mL  Total OUT: 1300 mL    Total NET: 1160 mL      24 Apr 2022 07:01  -  25 Apr 2022 06:25  --------------------------------------------------------  IN:    Enteral Tube Flush: 525 mL    Glucerna 1.5: 1260 mL    IV PiggyBack: 200 mL  Total IN: 1985 mL    OUT:    Indwelling Catheter - Urethral (mL): 1800 mL  Total OUT: 1800 mL    Total NET: 185 mL               Discussed with:     Cultures:	        Radiology

## 2022-04-25 NOTE — PROGRESS NOTE ADULT - SUBJECTIVE AND OBJECTIVE BOX
Subjective: Patient seen and examined. No overnight events.     MEDICATIONS  (STANDING):  chlorhexidine 0.12% Liquid 15 milliLiter(s) Oral Mucosa every 12 hours  chlorhexidine 2% Cloths 1 Application(s) Topical daily  dextrose 5%. 1000 milliLiter(s) (50 mL/Hr) IV Continuous <Continuous>  dextrose 5%. 1000 milliLiter(s) (100 mL/Hr) IV Continuous <Continuous>  dextrose 50% Injectable 25 Gram(s) IV Push once  dextrose 50% Injectable 12.5 Gram(s) IV Push once  dextrose 50% Injectable 25 Gram(s) IV Push once  folic acid 1 milliGRAM(s) Oral daily  glucagon  Injectable 1 milliGRAM(s) IntraMuscular once  heparin   Injectable 5000 Unit(s) SubCutaneous every 12 hours  insulin lispro (ADMELOG) corrective regimen sliding scale   SubCutaneous every 6 hours  lactobacillus acidophilus 1 Tablet(s) Oral two times a day  LORazepam     Tablet 1 milliGRAM(s) Oral every 4 hours  methocarbamol 500 milliGRAM(s) Oral two times a day  mineral oil/petrolatum Hydrophilic Ointment 1 Application(s) Topical daily  pantoprazole   Suspension 40 milliGRAM(s) Oral daily  piperacillin/tazobactam IVPB.. 3.375 Gram(s) IV Intermittent every 8 hours  polyethylene glycol 3350 17 Gram(s) Oral two times a day  povidone iodine 10% Ointment 1 Application(s) Topical every 12 hours  senna 2 Tablet(s) Oral at bedtime  silver sulfADIAZINE 1% Cream 1 Application(s) Topical daily  simethicone 80 milliGRAM(s) Chew every 6 hours  sucralfate 1 Gram(s) Oral four times a day    MEDICATIONS  (PRN):  acetaminophen     Tablet .. 650 milliGRAM(s) Oral every 6 hours PRN Temp greater or equal to 38C (100.4F)  ALBUTerol    90 MICROgram(s) HFA Inhaler 2 Puff(s) Inhalation every 6 hours PRN Shortness of Breath and/or Wheezing  dextrose Oral Gel 15 Gram(s) Oral once PRN Blood Glucose LESS THAN 70 milliGRAM(s)/deciliter  LORazepam   Injectable 1 milliGRAM(s) IV Push every 6 hours PRN Anxiety  sodium chloride 0.9% lock flush 10 milliLiter(s) IV Push every 1 hour PRN Pre/post blood products, medications, blood draw, and to maintain line patency      Allergies    codeine (Hives)    Intolerances        Vital Signs Last 24 Hrs  T(C): 37 (25 Apr 2022 06:00), Max: 37.9 (24 Apr 2022 20:00)  T(F): 98.6 (25 Apr 2022 06:00), Max: 100.3 (24 Apr 2022 20:00)  HR: 103 (25 Apr 2022 10:55) (82 - 111)  BP: 115/64 (25 Apr 2022 10:23) (85/59 - 144/67)  BP(mean): 78 (25 Apr 2022 10:23) (68 - 111)  RR: 28 (25 Apr 2022 10:23) (16 - 42)  SpO2: 97% (25 Apr 2022 10:55) (93% - 100%)    PHYSICAL EXAM:  GENERAL: NAD, well-groomed, well-developed  HEAD:  Atraumatic, Normocephalic  ENMT: Moist mucous membranes,   NECK: Supple, No JVD, Normal thyroid  NERVOUS SYSTEM:  All 4 extremities mobile, no gross sensory deficits.   CHEST/LUNG: Clear to auscultation bilaterally; No rales, rhonchi, wheezing, or rubs  HEART: Regular rate and rhythm; No murmurs, rubs, or gallops  ABDOMEN: Soft, Nontender, Nondistended; Bowel sounds present  EXTREMITIES:  2+ Peripheral Pulses, No clubbing, cyanosis, or edema      LABS:                        7.1    6.81  )-----------( 220      ( 25 Apr 2022 05:58 )             23.7     25 Apr 2022 05:58    139    |  103    |  46     ----------------------------<  211    4.3     |  27     |  1.12     Ca    8.6        25 Apr 2022 05:58    TPro  6.6    /  Alb  1.7    /  TBili  0.5    /  DBili  x      /  AST  16     /  ALT  20     /  AlkPhos  209    25 Apr 2022 05:58        CAPILLARY BLOOD GLUCOSE      POCT Blood Glucose.: 233 mg/dL (25 Apr 2022 11:22)  POCT Blood Glucose.: 220 mg/dL (25 Apr 2022 07:28)  POCT Blood Glucose.: 193 mg/dL (25 Apr 2022 05:23)  POCT Blood Glucose.: 205 mg/dL (24 Apr 2022 23:57)  POCT Blood Glucose.: 213 mg/dL (24 Apr 2022 16:54)  POCT Blood Glucose.: 209 mg/dL (24 Apr 2022 11:28)      RADIOLOGY & ADDITIONAL TESTS:    Imaging Personally Reviewed:  [ ] YES     Consultant(s) Notes Reviewed:      Care Discussed with Consultants/Other Providers:    Advanced Directives: [ ] DNR  [ ] No feeding tube  [ ] MOLST in chart  [ ] MOLST completed today  [ ] Unknown   Subjective: Patient seen and examined. No overnight events.     MEDICATIONS  (STANDING):  chlorhexidine 0.12% Liquid 15 milliLiter(s) Oral Mucosa every 12 hours  chlorhexidine 2% Cloths 1 Application(s) Topical daily  dextrose 5%. 1000 milliLiter(s) (50 mL/Hr) IV Continuous <Continuous>  dextrose 5%. 1000 milliLiter(s) (100 mL/Hr) IV Continuous <Continuous>  dextrose 50% Injectable 25 Gram(s) IV Push once  dextrose 50% Injectable 12.5 Gram(s) IV Push once  dextrose 50% Injectable 25 Gram(s) IV Push once  folic acid 1 milliGRAM(s) Oral daily  glucagon  Injectable 1 milliGRAM(s) IntraMuscular once  heparin   Injectable 5000 Unit(s) SubCutaneous every 12 hours  insulin lispro (ADMELOG) corrective regimen sliding scale   SubCutaneous every 6 hours  lactobacillus acidophilus 1 Tablet(s) Oral two times a day  LORazepam     Tablet 1 milliGRAM(s) Oral every 4 hours  methocarbamol 500 milliGRAM(s) Oral two times a day  mineral oil/petrolatum Hydrophilic Ointment 1 Application(s) Topical daily  pantoprazole   Suspension 40 milliGRAM(s) Oral daily  piperacillin/tazobactam IVPB.. 3.375 Gram(s) IV Intermittent every 8 hours  polyethylene glycol 3350 17 Gram(s) Oral two times a day  povidone iodine 10% Ointment 1 Application(s) Topical every 12 hours  senna 2 Tablet(s) Oral at bedtime  silver sulfADIAZINE 1% Cream 1 Application(s) Topical daily  simethicone 80 milliGRAM(s) Chew every 6 hours  sucralfate 1 Gram(s) Oral four times a day    MEDICATIONS  (PRN):  acetaminophen     Tablet .. 650 milliGRAM(s) Oral every 6 hours PRN Temp greater or equal to 38C (100.4F)  ALBUTerol    90 MICROgram(s) HFA Inhaler 2 Puff(s) Inhalation every 6 hours PRN Shortness of Breath and/or Wheezing  dextrose Oral Gel 15 Gram(s) Oral once PRN Blood Glucose LESS THAN 70 milliGRAM(s)/deciliter  LORazepam   Injectable 1 milliGRAM(s) IV Push every 6 hours PRN Anxiety  sodium chloride 0.9% lock flush 10 milliLiter(s) IV Push every 1 hour PRN Pre/post blood products, medications, blood draw, and to maintain line patency      Allergies    codeine (Hives)    Intolerances        Vital Signs Last 24 Hrs  T(C): 37 (25 Apr 2022 06:00), Max: 37.9 (24 Apr 2022 20:00)  T(F): 98.6 (25 Apr 2022 06:00), Max: 100.3 (24 Apr 2022 20:00)  HR: 103 (25 Apr 2022 10:55) (82 - 111)  BP: 115/64 (25 Apr 2022 10:23) (85/59 - 144/67)  BP(mean): 78 (25 Apr 2022 10:23) (68 - 111)  RR: 28 (25 Apr 2022 10:23) (16 - 42)  SpO2: 97% (25 Apr 2022 10:55) (93% - 100%)    PHYSICAL EXAM:  GENERAL: NAD On ventillator   HEAD:  Atraumatic, Normocephalic  ENMT: Trach to Vent   NECK: Supple, No JVD, Normal thyroid  CHEST/LUNG: Clear to auscultation bilaterally; No rales, rhonchi, wheezing, or rubs  HEART: Regular rate and rhythm; No murmurs, rubs, or gallops  ABDOMEN: Soft, Nontender, PEG Tube   EXTREMITIES:  2+ Peripheral Pulses, No clubbing, cyanosis, or edema      LABS:                        7.1    6.81  )-----------( 220      ( 25 Apr 2022 05:58 )             23.7     25 Apr 2022 05:58    139    |  103    |  46     ----------------------------<  211    4.3     |  27     |  1.12     Ca    8.6        25 Apr 2022 05:58    TPro  6.6    /  Alb  1.7    /  TBili  0.5    /  DBili  x      /  AST  16     /  ALT  20     /  AlkPhos  209    25 Apr 2022 05:58        CAPILLARY BLOOD GLUCOSE      POCT Blood Glucose.: 233 mg/dL (25 Apr 2022 11:22)  POCT Blood Glucose.: 220 mg/dL (25 Apr 2022 07:28)  POCT Blood Glucose.: 193 mg/dL (25 Apr 2022 05:23)  POCT Blood Glucose.: 205 mg/dL (24 Apr 2022 23:57)  POCT Blood Glucose.: 213 mg/dL (24 Apr 2022 16:54)  POCT Blood Glucose.: 209 mg/dL (24 Apr 2022 11:28)      RADIOLOGY & ADDITIONAL TESTS:    Imaging Personally Reviewed:  [ ] YES     Consultant(s) Notes Reviewed:      Care Discussed with Consultants/Other Providers:    Advanced Directives: [ ] DNR  [ ] No feeding tube  [ ] MOLST in chart  [ ] MOLST completed today  [ ] Unknown

## 2022-04-25 NOTE — PROGRESS NOTE ADULT - ASSESSMENT
75 y/o F with PMH of HTN, DM type 2, CVA, recent Cardiac Arrest, Chronic Respiratory Failure, Vent Dependant s/p trach & PEG, Anemia with GI blood loss, and Dementia who was sent from [facility] facility for acute respiratory distress, fevers, hypotension.    sepsis  hypotension  OP  OA  chr resp failure  cva  hx of card arrest  DM    pressor weaning - monitor HD -     emp ABX  cx - biomarkers  ct imaging reviewed  labs reviewed  assist with needs    with HCP   pt is full code  old records reviewed  GOC documented

## 2022-04-25 NOTE — PROGRESS NOTE ADULT - SUBJECTIVE AND OBJECTIVE BOX
Chief Complaint: Respiratory failure    Interval Events: No events overnight.    Review of Systems:  Unable to obtain    Physical Exam:  Vital Signs Last 24 Hrs  T(C): 37 (25 Apr 2022 06:00), Max: 37.9 (24 Apr 2022 20:00)  T(F): 98.6 (25 Apr 2022 06:00), Max: 100.3 (24 Apr 2022 20:00)  HR: 96 (25 Apr 2022 07:00) (82 - 111)  BP: 102/69 (25 Apr 2022 07:00) (85/59 - 144/67)  BP(mean): 81 (25 Apr 2022 07:00) (67 - 89)  RR: 16 (25 Apr 2022 07:00) (16 - 42)  SpO2: 98% (25 Apr 2022 07:00) (93% - 100%)  General: NAD  HEENT: Trach  Neck: No JVD, no carotid bruit  Lungs: Coarse bilaterally  CV: RRR, nl S1/S2, no M/R/G  Abdomen: S/NT/ND, +BS  Extremities: No LE edema, no cyanosis  Neuro: AAOx0  Skin: No rash    Labs:             04-25    139  |  103  |  46<H>  ----------------------------<  211<H>  4.3   |  27  |  1.12    Ca    8.6      25 Apr 2022 05:58    TPro  6.6  /  Alb  1.7<L>  /  TBili  0.5  /  DBili  x   /  AST  16  /  ALT  20  /  AlkPhos  209<H>  04-25                        7.1    6.81  )-----------( 220      ( 25 Apr 2022 05:58 )             23.7       Telemetry: Sinus rhythm

## 2022-04-25 NOTE — PROGRESS NOTE ADULT - ASSESSMENT
75 y/o F with PMH of HTN, DM type 2, CVA, recent Cardiac Arrest, Chronic Respiratory Failure, Vent Dependant s/p trach & PEG, Anemia with GI blood loss, and Dementia who was sent from [facility] facility for acute respiratory distress, fevers, hypotension.    Assessment:  1. Acute on chronic respiratory failure  2. Septic Shock, resolved   3. RYAN  5. Anemia   6. Pleural effusion.   7. ESBL UTI  8. Pseudomonal and serratia pneumonia     Plan:  Neuro: Mentation at baseline. CT head negative for acute pathology. Ativan PRN   CV: Monitoring end points of perfusion. Maintain MAP >6  Resp: s/p Trach. On AC/VC, actively titrating FiO2 to maintain O2 sat >92%. . Vent bundle in place. Aspiration precautions.  GI: Tolerating tube feeds.  PPI daily.   Renal: RYAN resolved.   - Lactic acidosis, s/p IVFs.  Trend lactate until cleared   ID: On Zosyn for ESBL UTI and  pneumonia.  Blood  cultures w/ no growth to date . ID following   Heme: Anemia, s/p multiple PRBC. No evidence of bleeding, likely chronic anemia. Trend H/H. Fecal occult negative. Will transfuse for Hg <7.

## 2022-04-25 NOTE — PROGRESS NOTE ADULT - SUBJECTIVE AND OBJECTIVE BOX
[facility], Division of Infectious Diseases  MOIZ Lora Y. Patel, S. Shah, G. Casimir  [phone]  ([phone] - weekdays after 5pm and weekends)    Name: BREA BECKHAM  Age/Gender: 76y Female  MRN: [id number]    Interval History:  Patient seen this morning.   Notes reviewed.   No concerning overnight events.  Afebrile.     Allergies: codeine (Hives)      Objective:  Vitals:   T(F): 98.6 (04-25-22 @ 06:00), Max: 100.3 (04-24-22 @ 20:00)  HR: 105 (04-25-22 @ 12:00) (82 - 111)  BP: 123/55 (04-25-22 @ 12:00) (85/59 - 144/67)  RR: 27 (04-25-22 @ 12:00) (16 - 42)  SpO2: 96% (04-25-22 @ 12:00) (93% - 100%)  Physical Examination:  General: no acute distress, nontoxic appearing  HEENT: anicteric  Cardio: S1, S2 present, tachycardic  Resp: tracheostomy, on vent  Abd: soft, mildly distended  Ext: b/l LE edema  Skin: warm, dry  Lines:    Laboratory Studies:  CBC:                       7.1    6.81  )-----------( 220      ( 25 Apr 2022 05:58 )             23.7     WBC Trend:  6.81 04-25-22 @ 05:58  9.33 04-24-22 @ 05:50  11.35 04-23-22 @ 06:07  15.47 04-22-22 @ 06:58  14.93 04-21-22 @ 22:35  14.15 04-21-22 @ 13:04    CMP: 04-25    139  |  103  |  46<H>  ----------------------------<  211<H>  4.3   |  27  |  1.12    Ca    8.6      25 Apr 2022 05:58    TPro  6.6  /  Alb  1.7<L>  /  TBili  0.5  /  DBili  x   /  AST  16  /  ALT  20  /  AlkPhos  209<H>  04-25      LIVER FUNCTIONS - ( 25 Apr 2022 05:58 )  Alb: 1.7 g/dL / Pro: 6.6 g/dL / ALK PHOS: 209 U/L / ALT: 20 U/L DA / AST: 16 U/L / GGT: x               Microbiology: reviewed     Culture - Sputum (collected 04-24-22 @ 12:35)  Source: .Sputum Sputum  Gram Stain (04-24-22 @ 15:10):    Few Squamous epithelial cells per low power field    Moderate polymorphonuclear leukocytes per low power field    Moderate Gram Negative Rods per oil power field  Preliminary Report (04-25-22 @ 09:50):    Moderate Pseudomonas aeruginosa    Moderate Serratia marcescens    Normal Respiratory Elvira present    Culture - Other (collected 04-22-22 @ 20:58)  Source: Skin Skin  Preliminary Report (04-24-22 @ 19:55):    Numerous Klebsiella pneumoniae    Culture - Urine (collected 04-21-22 @ 18:36)  Source: Clean Catch Clean Catch (Midstream)  Final Report (04-24-22 @ 08:46):    >100,000 CFU/ml Escherichia coli ESBL  Organism: Escherichia coli ESBL (04-24-22 @ 08:46)  Organism: Escherichia coli ESBL (04-24-22 @ 08:46)      -  Amikacin: S <=16      -  Amoxicillin/Clavulanic Acid: I 16/8      -  Ampicillin: R >16 These ampicillin results predict results for amoxicillin      -  Ampicillin/Sulbactam: R >16/8 Enterobacter, Klebsiella aerogenes, Citrobacter, and Serratia may develop resistance during prolonged therapy (3-4 days)      -  Aztreonam: R >16      -  Cefazolin: R >16 (MIC_CL_[id number]) For uncomplicated UTI with K. pneumoniae, E. coli, or P. mirablis: PRATIK <=16 is sensitive and PRATIK >=32 is resistant. This also predicts results for oral agents cefaclor, cefdinir, cefpodoxime, cefprozil, cefuroxime axetil, cephalexin and locarbef for uncomplicated UTI. Note that some isolates may be susceptible to these agents while testing resistant to cefazolin.      -  Cefepime: R >16      -  Ceftriaxone: R >32 Enterobacter, Klebsiella aerogenes, Citrobacter, and Serratia may develop resistance during prolonged therapy      -  Ciprofloxacin: R >2      -  Ertapenem: S <=0.5      -  Gentamicin: R >8      -  Imipenem: S <=1      -  Levofloxacin: R 4      -  Meropenem: S <=1      -  Nitrofurantoin: S <=32 Should not be used to treat pyelonephritis      -  Piperacillin/Tazobactam: S <=8      -  Tigecycline: S <=2      -  Tobramycin: R >8      -  Trimethoprim/Sulfamethoxazole: R >2/38      Method Type: PRATIK    Culture - Blood (collected 04-21-22 @ 18:24)  Source: .Blood Blood-Peripheral  Preliminary Report (04-22-22 @ 19:01):    No growth to date.    Culture - Blood (collected 04-21-22 @ 18:24)  Source: .Blood Blood-Peripheral  Preliminary Report (04-22-22 @ 19:01):    No growth to date.      Radiology: reviewed     Medications:  acetaminophen     Tablet .. 650 milliGRAM(s) Oral every 6 hours PRN  ALBUTerol    90 MICROgram(s) HFA Inhaler 2 Puff(s) Inhalation every 6 hours PRN  chlorhexidine 0.12% Liquid 15 milliLiter(s) Oral Mucosa every 12 hours  chlorhexidine 2% Cloths 1 Application(s) Topical daily  dextrose 5%. 1000 milliLiter(s) IV Continuous <Continuous>  dextrose 5%. 1000 milliLiter(s) IV Continuous <Continuous>  dextrose 50% Injectable 25 Gram(s) IV Push once  dextrose 50% Injectable 12.5 Gram(s) IV Push once  dextrose 50% Injectable 25 Gram(s) IV Push once  dextrose Oral Gel 15 Gram(s) Oral once PRN  folic acid 1 milliGRAM(s) Oral daily  glucagon  Injectable 1 milliGRAM(s) IntraMuscular once  heparin   Injectable 5000 Unit(s) SubCutaneous every 12 hours  insulin lispro (ADMELOG) corrective regimen sliding scale   SubCutaneous every 6 hours  lactobacillus acidophilus 1 Tablet(s) Oral two times a day  LORazepam     Tablet 1 milliGRAM(s) Oral every 4 hours  LORazepam   Injectable 1 milliGRAM(s) IV Push every 6 hours PRN  methocarbamol 500 milliGRAM(s) Oral two times a day  mineral oil/petrolatum Hydrophilic Ointment 1 Application(s) Topical daily  pantoprazole   Suspension 40 milliGRAM(s) Oral daily  piperacillin/tazobactam IVPB.. 3.375 Gram(s) IV Intermittent every 8 hours  polyethylene glycol 3350 17 Gram(s) Oral two times a day  povidone iodine 10% Ointment 1 Application(s) Topical every 12 hours  senna 2 Tablet(s) Oral at bedtime  silver sulfADIAZINE 1% Cream 1 Application(s) Topical daily  simethicone 80 milliGRAM(s) Chew every 6 hours  sodium chloride 0.9% lock flush 10 milliLiter(s) IV Push every 1 hour PRN  sucralfate 1 Gram(s) Oral four times a day    Antimicrobials:  piperacillin/tazobactam IVPB.. 3.375 Gram(s) IV Intermittent every 8 hours

## 2022-04-25 NOTE — PROGRESS NOTE ADULT - ASSESSMENT
CHAPINCITO GUIRDY 75 f [facility] S 4/21/2022   DR ILIANA KEMP     REVIEW OF SYMPTOMS      Able to give (reliable) ROS  NO     PHYSICAL EXAM    HEENT Unremarkable  atraumatic   RESP Fair air entry EXP prolonged    Harsh breath sound Resp distres mild   CARDIAC S1 S2 No S3     NO JVD    ABDOMEN SOFT BS PRESENT NOT DISTENDED No hepatosplenomegaly   PEDAL EDEMA present No calf tenderness  NO rash       DOA/CC/PROBLEMS poa .  76 f   from [facility]  doa 4/21/2022  cc fever hypotension    PRESENTING PROBLEMS 4/21 4/23/2022   Sepsis   Lacticemia La 2.8   Anemia Hb 6   RYAN Cr 1.3     PMH-PSH .  Pneumonia  HTN, DM, CVA, dementia, chronic respiratory failure s/p trach, PEG, cardiac arrest    pmh     HOSP COURSE.  4/21 c line int jugul  4/21 ZOSYN   4/21 1 u prbc   4/21 urine c 100k e coli   4/22/2022 weaned off nore  4/22 C diff indet       SHOCK.  4/21 norepi  4/22/2022 wened off nore     LACTICEMIA.  La 4/21-4/22/2022 la 2.8 - 1.2     COPD.  4/21 albuterol  4/21 spiriva   under control    ANEMIA.   Hb 4/21-4/22-4/23-4/24-4/25/2022 hb 6 - 8.4 -7.7 - 7.5 - 7.1   Monitro serial    TRANSFUSION  4/21 1 u prbc       TIME SPENT   Over 36 minutes aggregate critical care time spent on encounter; activities included   direct patient care, counseling and/or coordinating care reviewing notes, lab data/ imaging , discussion with multidisciplinary team/ patient  /family and explaining in detail risks, benefits, alternatives  of the recommendations     CHAPINCITO GUIDRY 75 f [facility] S 4/21/2022   DR ILIANA KEMP

## 2022-04-26 LAB
ALBUMIN SERPL ELPH-MCNC: 1.7 G/DL — LOW (ref 3.3–5)
ALP SERPL-CCNC: 205 U/L — HIGH (ref 30–120)
ALT FLD-CCNC: 19 U/L DA — SIGNIFICANT CHANGE UP (ref 10–60)
ANION GAP SERPL CALC-SCNC: 9 MMOL/L — SIGNIFICANT CHANGE UP (ref 5–17)
AST SERPL-CCNC: 14 U/L — SIGNIFICANT CHANGE UP (ref 10–40)
BASOPHILS # BLD AUTO: 0.04 K/UL — SIGNIFICANT CHANGE UP (ref 0–0.2)
BASOPHILS NFR BLD AUTO: 0.4 % — SIGNIFICANT CHANGE UP (ref 0–2)
BILIRUB SERPL-MCNC: 0.4 MG/DL — SIGNIFICANT CHANGE UP (ref 0.2–1.2)
BUN SERPL-MCNC: 40 MG/DL — HIGH (ref 7–23)
CALCIUM SERPL-MCNC: 8.9 MG/DL — SIGNIFICANT CHANGE UP (ref 8.4–10.5)
CHLORIDE SERPL-SCNC: 104 MMOL/L — SIGNIFICANT CHANGE UP (ref 96–108)
CO2 SERPL-SCNC: 27 MMOL/L — SIGNIFICANT CHANGE UP (ref 22–31)
CREAT SERPL-MCNC: 1.15 MG/DL — SIGNIFICANT CHANGE UP (ref 0.5–1.3)
CULTURE RESULTS: SIGNIFICANT CHANGE UP
CULTURE RESULTS: SIGNIFICANT CHANGE UP
EGFR: 49 ML/MIN/1.73M2 — LOW
EOSINOPHIL # BLD AUTO: 0.12 K/UL — SIGNIFICANT CHANGE UP (ref 0–0.5)
EOSINOPHIL NFR BLD AUTO: 1.1 % — SIGNIFICANT CHANGE UP (ref 0–6)
GLUCOSE BLDC GLUCOMTR-MCNC: 199 MG/DL — HIGH (ref 70–99)
GLUCOSE BLDC GLUCOMTR-MCNC: 201 MG/DL — HIGH (ref 70–99)
GLUCOSE BLDC GLUCOMTR-MCNC: 203 MG/DL — HIGH (ref 70–99)
GLUCOSE BLDC GLUCOMTR-MCNC: 205 MG/DL — HIGH (ref 70–99)
GLUCOSE SERPL-MCNC: 244 MG/DL — HIGH (ref 70–99)
HCT VFR BLD CALC: 25.3 % — LOW (ref 34.5–45)
HGB BLD-MCNC: 7.5 G/DL — LOW (ref 11.5–15.5)
IMM GRANULOCYTES NFR BLD AUTO: 1.5 % — SIGNIFICANT CHANGE UP (ref 0–1.5)
LYMPHOCYTES # BLD AUTO: 1.21 K/UL — SIGNIFICANT CHANGE UP (ref 1–3.3)
LYMPHOCYTES # BLD AUTO: 11.5 % — LOW (ref 13–44)
MCHC RBC-ENTMCNC: 23.8 PG — LOW (ref 27–34)
MCHC RBC-ENTMCNC: 29.6 GM/DL — LOW (ref 32–36)
MCV RBC AUTO: 80.3 FL — SIGNIFICANT CHANGE UP (ref 80–100)
MONOCYTES # BLD AUTO: 1.05 K/UL — HIGH (ref 0–0.9)
MONOCYTES NFR BLD AUTO: 10 % — SIGNIFICANT CHANGE UP (ref 2–14)
NEUTROPHILS # BLD AUTO: 7.9 K/UL — HIGH (ref 1.8–7.4)
NEUTROPHILS NFR BLD AUTO: 75.5 % — SIGNIFICANT CHANGE UP (ref 43–77)
NRBC # BLD: 0 /100 WBCS — SIGNIFICANT CHANGE UP (ref 0–0)
PLATELET # BLD AUTO: 269 K/UL — SIGNIFICANT CHANGE UP (ref 150–400)
POTASSIUM SERPL-MCNC: 4.5 MMOL/L — SIGNIFICANT CHANGE UP (ref 3.5–5.3)
POTASSIUM SERPL-SCNC: 4.5 MMOL/L — SIGNIFICANT CHANGE UP (ref 3.5–5.3)
PROT SERPL-MCNC: 6.9 G/DL — SIGNIFICANT CHANGE UP (ref 6–8.3)
RBC # BLD: 3.15 M/UL — LOW (ref 3.8–5.2)
RBC # FLD: 20.3 % — HIGH (ref 10.3–14.5)
SODIUM SERPL-SCNC: 140 MMOL/L — SIGNIFICANT CHANGE UP (ref 135–145)
SPECIMEN SOURCE: SIGNIFICANT CHANGE UP
SPECIMEN SOURCE: SIGNIFICANT CHANGE UP
WBC # BLD: 10.48 K/UL — SIGNIFICANT CHANGE UP (ref 3.8–10.5)
WBC # FLD AUTO: 10.48 K/UL — SIGNIFICANT CHANGE UP (ref 3.8–10.5)

## 2022-04-26 PROCEDURE — 99233 SBSQ HOSP IP/OBS HIGH 50: CPT

## 2022-04-26 RX ORDER — INSULIN GLARGINE 100 [IU]/ML
10 INJECTION, SOLUTION SUBCUTANEOUS EVERY MORNING
Refills: 0 | Status: DISCONTINUED | OUTPATIENT
Start: 2022-04-27 | End: 2022-04-29

## 2022-04-26 RX ORDER — PANTOPRAZOLE SODIUM 20 MG/1
40 TABLET, DELAYED RELEASE ORAL DAILY
Refills: 0 | Status: DISCONTINUED | OUTPATIENT
Start: 2022-04-26 | End: 2022-04-29

## 2022-04-26 RX ORDER — INSULIN GLARGINE 100 [IU]/ML
10 INJECTION, SOLUTION SUBCUTANEOUS ONCE
Refills: 0 | Status: COMPLETED | OUTPATIENT
Start: 2022-04-26 | End: 2022-04-26

## 2022-04-26 RX ADMIN — PIPERACILLIN AND TAZOBACTAM 25 GRAM(S): 4; .5 INJECTION, POWDER, LYOPHILIZED, FOR SOLUTION INTRAVENOUS at 20:20

## 2022-04-26 RX ADMIN — CHLORHEXIDINE GLUCONATE 1 APPLICATION(S): 213 SOLUTION TOPICAL at 11:31

## 2022-04-26 RX ADMIN — Medication 2: at 11:31

## 2022-04-26 RX ADMIN — CHLORHEXIDINE GLUCONATE 15 MILLILITER(S): 213 SOLUTION TOPICAL at 17:14

## 2022-04-26 RX ADMIN — PIPERACILLIN AND TAZOBACTAM 25 GRAM(S): 4; .5 INJECTION, POWDER, LYOPHILIZED, FOR SOLUTION INTRAVENOUS at 05:25

## 2022-04-26 RX ADMIN — Medication 1 MILLIGRAM(S): at 20:20

## 2022-04-26 RX ADMIN — INSULIN GLARGINE 10 UNIT(S): 100 INJECTION, SOLUTION SUBCUTANEOUS at 15:35

## 2022-04-26 RX ADMIN — PANTOPRAZOLE SODIUM 40 MILLIGRAM(S): 20 TABLET, DELAYED RELEASE ORAL at 11:31

## 2022-04-26 RX ADMIN — SIMETHICONE 80 MILLIGRAM(S): 80 TABLET, CHEWABLE ORAL at 23:44

## 2022-04-26 RX ADMIN — Medication 1 APPLICATION(S): at 11:32

## 2022-04-26 RX ADMIN — Medication 1 APPLICATION(S): at 11:33

## 2022-04-26 RX ADMIN — Medication 1 MILLIGRAM(S): at 00:42

## 2022-04-26 RX ADMIN — Medication 1 GRAM(S): at 05:25

## 2022-04-26 RX ADMIN — Medication 4: at 05:33

## 2022-04-26 RX ADMIN — Medication 1 TABLET(S): at 05:25

## 2022-04-26 RX ADMIN — PIPERACILLIN AND TAZOBACTAM 25 GRAM(S): 4; .5 INJECTION, POWDER, LYOPHILIZED, FOR SOLUTION INTRAVENOUS at 12:04

## 2022-04-26 RX ADMIN — Medication 1 MILLIGRAM(S): at 08:06

## 2022-04-26 RX ADMIN — Medication 4: at 17:15

## 2022-04-26 RX ADMIN — Medication 4: at 23:44

## 2022-04-26 RX ADMIN — CHLORHEXIDINE GLUCONATE 15 MILLILITER(S): 213 SOLUTION TOPICAL at 05:25

## 2022-04-26 RX ADMIN — Medication 1 MILLIGRAM(S): at 23:44

## 2022-04-26 RX ADMIN — HEPARIN SODIUM 5000 UNIT(S): 5000 INJECTION INTRAVENOUS; SUBCUTANEOUS at 17:14

## 2022-04-26 RX ADMIN — Medication 1 MILLIGRAM(S): at 15:36

## 2022-04-26 RX ADMIN — Medication 1 APPLICATION(S): at 05:25

## 2022-04-26 RX ADMIN — Medication 1 APPLICATION(S): at 17:25

## 2022-04-26 RX ADMIN — SIMETHICONE 80 MILLIGRAM(S): 80 TABLET, CHEWABLE ORAL at 05:25

## 2022-04-26 RX ADMIN — Medication 1 GRAM(S): at 23:44

## 2022-04-26 RX ADMIN — METHOCARBAMOL 500 MILLIGRAM(S): 500 TABLET, FILM COATED ORAL at 05:26

## 2022-04-26 RX ADMIN — Medication 1 MILLIGRAM(S): at 03:21

## 2022-04-26 RX ADMIN — HEPARIN SODIUM 5000 UNIT(S): 5000 INJECTION INTRAVENOUS; SUBCUTANEOUS at 05:25

## 2022-04-26 NOTE — PROGRESS NOTE ADULT - SUBJECTIVE AND OBJECTIVE BOX
Date/Time Patient Seen:  		  Referring MD:   Data Reviewed	       Patient is a 76y old  Female who presents with a chief complaint of Anemia (25 Apr 2022 13:09)      Subjective/HPI     PAST MEDICAL & SURGICAL HISTORY:  Dementia of frontal lobe type    Aphasic stroke    Diabetes mellitus    Respiratory failure    Hypertension    GERD (gastroesophageal reflux disease)    Constipation    Respiratory failure    CVA (cerebral vascular accident)    HTN (hypertension)    DM (diabetes mellitus)    Advanced dementia    COVID-19 virus detected    Quadriplegia    Pneumonia    Type II diabetes mellitus    Hx of appendectomy    Gastrostomy in place    Tracheostomy in place    Tracheostomy tube present    Feeding by G-tube          Medication list         MEDICATIONS  (STANDING):  chlorhexidine 0.12% Liquid 15 milliLiter(s) Oral Mucosa every 12 hours  chlorhexidine 2% Cloths 1 Application(s) Topical daily  collagenase Ointment 1 Application(s) Topical daily  dextrose 5%. 1000 milliLiter(s) (100 mL/Hr) IV Continuous <Continuous>  dextrose 5%. 1000 milliLiter(s) (50 mL/Hr) IV Continuous <Continuous>  dextrose 50% Injectable 25 Gram(s) IV Push once  dextrose 50% Injectable 12.5 Gram(s) IV Push once  dextrose 50% Injectable 25 Gram(s) IV Push once  folic acid 1 milliGRAM(s) Oral daily  glucagon  Injectable 1 milliGRAM(s) IntraMuscular once  heparin   Injectable 5000 Unit(s) SubCutaneous every 12 hours  insulin lispro (ADMELOG) corrective regimen sliding scale   SubCutaneous every 6 hours  lactobacillus acidophilus 1 Tablet(s) Oral two times a day  LORazepam     Tablet 1 milliGRAM(s) Oral every 4 hours  methocarbamol 500 milliGRAM(s) Oral two times a day  mineral oil/petrolatum Hydrophilic Ointment 1 Application(s) Topical daily  pantoprazole   Suspension 40 milliGRAM(s) Oral daily  piperacillin/tazobactam IVPB.. 3.375 Gram(s) IV Intermittent every 8 hours  polyethylene glycol 3350 17 Gram(s) Oral two times a day  povidone iodine 10% Ointment 1 Application(s) Topical every 12 hours  senna 2 Tablet(s) Oral at bedtime  simethicone 80 milliGRAM(s) Chew every 6 hours  sucralfate 1 Gram(s) Oral four times a day    MEDICATIONS  (PRN):  acetaminophen     Tablet .. 650 milliGRAM(s) Oral every 6 hours PRN Temp greater or equal to 38C (100.4F)  ALBUTerol    90 MICROgram(s) HFA Inhaler 2 Puff(s) Inhalation every 6 hours PRN Shortness of Breath and/or Wheezing  dextrose Oral Gel 15 Gram(s) Oral once PRN Blood Glucose LESS THAN 70 milliGRAM(s)/deciliter  LORazepam   Injectable 1 milliGRAM(s) IV Push every 6 hours PRN Anxiety  sodium chloride 0.9% lock flush 10 milliLiter(s) IV Push every 1 hour PRN Pre/post blood products, medications, blood draw, and to maintain line patency         Vitals log        ICU Vital Signs Last 24 Hrs  T(C): 37.3 (26 Apr 2022 04:07), Max: 38.4 (25 Apr 2022 20:27)  T(F): 99.2 (26 Apr 2022 04:07), Max: 101.1 (25 Apr 2022 20:27)  HR: 101 (26 Apr 2022 05:55) (91 - 113)  BP: 127/75 (26 Apr 2022 04:00) (97/56 - 136/75)  BP(mean): 91 (26 Apr 2022 04:00) (67 - 111)  ABP: 124/56 (25 Apr 2022 10:23) (108/48 - 127/56)  ABP(mean): 1 (25 Apr 2022 10:23) (1 - 83)  RR: 19 (26 Apr 2022 04:00) (16 - 33)  SpO2: 100% (26 Apr 2022 05:55) (94% - 100%)       Mode: AC/ CMV (Assist Control/ Continuous Mandatory Ventilation)  RR (machine): 18  TV (machine): 400  FiO2: 40  PEEP: 2  ITime: 1  MAP: 19  PIP: 36      Input and Output:  I&O's Detail    24 Apr 2022 07:01  -  25 Apr 2022 07:00  --------------------------------------------------------  IN:    Enteral Tube Flush: 550 mL    Glucerna 1.5: 1320 mL    IV PiggyBack: 200 mL  Total IN: 2070 mL    OUT:    Indwelling Catheter - Urethral (mL): 1800 mL  Total OUT: 1800 mL    Total NET: 270 mL      25 Apr 2022 07:01  -  26 Apr 2022 06:19  --------------------------------------------------------  IN:    Enteral Tube Flush: 550 mL    Glucerna 1.5: 1320 mL    IV PiggyBack: 100 mL  Total IN: 1970 mL    OUT:    Indwelling Catheter - Urethral (mL): 1500 mL  Total OUT: 1500 mL    Total NET: 470 mL          Lab Data                        7.1    6.81  )-----------( 220      ( 25 Apr 2022 05:58 )             23.7     04-25    139  |  103  |  46<H>  ----------------------------<  211<H>  4.3   |  27  |  1.12    Ca    8.6      25 Apr 2022 05:58    TPro  6.6  /  Alb  1.7<L>  /  TBili  0.5  /  DBili  x   /  AST  16  /  ALT  20  /  AlkPhos  209<H>  04-25            Review of Systems	      Objective     Physical Examination  heart s1s2  lung dec BS  abd soft        Pertinent Lab findings & Imaging      Annalise:  NO   Adequate UO     I&O's Detail    24 Apr 2022 07:01  -  25 Apr 2022 07:00  --------------------------------------------------------  IN:    Enteral Tube Flush: 550 mL    Glucerna 1.5: 1320 mL    IV PiggyBack: 200 mL  Total IN: 2070 mL    OUT:    Indwelling Catheter - Urethral (mL): 1800 mL  Total OUT: 1800 mL    Total NET: 270 mL      25 Apr 2022 07:01  -  26 Apr 2022 06:19  --------------------------------------------------------  IN:    Enteral Tube Flush: 550 mL    Glucerna 1.5: 1320 mL    IV PiggyBack: 100 mL  Total IN: 1970 mL    OUT:    Indwelling Catheter - Urethral (mL): 1500 mL  Total OUT: 1500 mL    Total NET: 470 mL               Discussed with:     Cultures:	        Radiology

## 2022-04-26 NOTE — CONSULT NOTE ADULT - CONSULT REQUESTED DATE/TIME
21-Apr-2022 15:03
21-Apr-2022 18:00
26-Apr-2022 14:43
21-Apr-2022
26-Apr-2022 11:32
21-Apr-2022 21:47
25-Apr-2022 12:56
22-Apr-2022 10:59
22-Apr-2022 14:09

## 2022-04-26 NOTE — PROGRESS NOTE ADULT - SUBJECTIVE AND OBJECTIVE BOX
Neurology follow up note    BREA [id number]      Interval History:    Patient resting in bed     Allergies    codeine (Hives)    Intolerances        MEDICATIONS    acetaminophen     Tablet .. 650 milliGRAM(s) Oral every 6 hours PRN  ALBUTerol    90 MICROgram(s) HFA Inhaler 2 Puff(s) Inhalation every 6 hours PRN  chlorhexidine 0.12% Liquid 15 milliLiter(s) Oral Mucosa every 12 hours  chlorhexidine 2% Cloths 1 Application(s) Topical daily  collagenase Ointment 1 Application(s) Topical daily  dextrose 5%. 1000 milliLiter(s) IV Continuous <Continuous>  dextrose 5%. 1000 milliLiter(s) IV Continuous <Continuous>  dextrose 50% Injectable 25 Gram(s) IV Push once  dextrose 50% Injectable 12.5 Gram(s) IV Push once  dextrose 50% Injectable 25 Gram(s) IV Push once  dextrose Oral Gel 15 Gram(s) Oral once PRN  folic acid 1 milliGRAM(s) Oral daily  glucagon  Injectable 1 milliGRAM(s) IntraMuscular once  heparin   Injectable 5000 Unit(s) SubCutaneous every 12 hours  insulin lispro (ADMELOG) corrective regimen sliding scale   SubCutaneous every 6 hours  lactobacillus acidophilus 1 Tablet(s) Oral two times a day  LORazepam     Tablet 1 milliGRAM(s) Oral every 4 hours  LORazepam   Injectable 1 milliGRAM(s) IV Push every 6 hours PRN  methocarbamol 500 milliGRAM(s) Oral two times a day  mineral oil/petrolatum Hydrophilic Ointment 1 Application(s) Topical daily  pantoprazole  Injectable 40 milliGRAM(s) IV Push daily  piperacillin/tazobactam IVPB.. 3.375 Gram(s) IV Intermittent every 8 hours  polyethylene glycol 3350 17 Gram(s) Oral two times a day  povidone iodine 10% Ointment 1 Application(s) Topical every 12 hours  senna 2 Tablet(s) Oral at bedtime  simethicone 80 milliGRAM(s) Chew every 6 hours  sodium chloride 0.9% lock flush 10 milliLiter(s) IV Push every 1 hour PRN  sucralfate 1 Gram(s) Oral four times a day              Vital Signs Last 24 Hrs  T(C): 37.4 (26 Apr 2022 08:59), Max: 38.4 (25 Apr 2022 20:27)  T(F): 99.4 (26 Apr 2022 08:59), Max: 101.1 (25 Apr 2022 20:27)  HR: 90 (26 Apr 2022 10:17) (90 - 113)  BP: 121/58 (26 Apr 2022 10:00) (103/52 - 136/75)  BP(mean): 76 (26 Apr 2022 10:00) (67 - 93)  RR: 17 (26 Apr 2022 10:00) (17 - 33)  SpO2: 98% (26 Apr 2022 10:17) (95% - 100%)      REVIEW OF SYSTEMS:  Cannot be obtained secondary to the patient being nonverbal.    PHYSICAL EXAMINATION:    HEENT:  Head:  Normocephalic.  Eyes:  No scleral icterus.  Ears:  Unable to evaluate with the patient being nonverbal and unresponsive.  NECK:  Tracheostomy was in place.  RESPIRATORY: Decreased breath sounds bilaterally.  ABDOMEN: Soft and nontender.  EXTREMITIES:  No clubbing or cyanosis was noted.      NEUROLOGIC:  No response to verbal stimuli.  I opened the patient's eyes, no blink to visual threat.  To painful stimuli, slight withdrawal bilateral upper and lower.  Motor:  Bilateral upper extremity was in flexed position.  Bilateral lower extremities were straight.  Upon stimulation, the patient would have subtle shaking.                 LABS:  CBC Full  -  ( 26 Apr 2022 06:39 )  WBC Count : 10.48 K/uL  RBC Count : 3.15 M/uL  Hemoglobin : 7.5 g/dL  Hematocrit : 25.3 %  Platelet Count - Automated : 269 K/uL  Mean Cell Volume : 80.3 fl  Mean Cell Hemoglobin : 23.8 pg  Mean Cell Hemoglobin Concentration : 29.6 gm/dL  Auto Neutrophil # : 7.90 K/uL  Auto Lymphocyte # : 1.21 K/uL  Auto Monocyte # : 1.05 K/uL  Auto Eosinophil # : 0.12 K/uL  Auto Basophil # : 0.04 K/uL  Auto Neutrophil % : 75.5 %  Auto Lymphocyte % : 11.5 %  Auto Monocyte % : 10.0 %  Auto Eosinophil % : 1.1 %  Auto Basophil % : 0.4 %      04-26    140  |  104  |  40<H>  ----------------------------<  244<H>  4.5   |  27  |  1.15    Ca    8.9      26 Apr 2022 06:39    TPro  6.9  /  Alb  1.7<L>  /  TBili  0.4  /  DBili  x   /  AST  14  /  ALT  19  /  AlkPhos  205<H>  04-26    Hemoglobin A1C:     LIVER FUNCTIONS - ( 26 Apr 2022 06:39 )  Alb: 1.7 g/dL / Pro: 6.9 g/dL / ALK PHOS: 205 U/L / ALT: 19 U/L DA / AST: 14 U/L / GGT: x           Vitamin B12         RADIOLOGY    ANALYSIS AND PLAN:  This is a 76-year-old with altered mental status and history of abnormal movements.  For altered mental status, most likely metabolic encephalopathy secondary to underlying pneumonia-type process along with signs of dehydration from SMA-7.  Antibiotics as needed.  Monitor renal function.  For abnormal movements, the patient has been evaluated multiple times in the past, as per my conversation with spouse, these were deemed nonepileptiform, at present we will refrain from any antiseizure medication and had multiple conversations in the past with spouse, he does not want any.  For history of spasms, continue the patient on muscle relaxants.  For history of diabetes, strict control of blood sugars.  If possible, please maintain a systolic blood pressure above 100 to help maintain cerebral perfusion.        Greater than 27 minutes of time spent with the patient, planning care, reviewing data, and speaking to multidisciplinary healthcare team with greater than 50% of that time in counseling and care coordination.

## 2022-04-26 NOTE — PROGRESS NOTE ADULT - ASSESSMENT
76F Chronic Respiratory Failure, HTN, DM2, Dementia, admitted for Septic Shock.     Acute on Chronic Hypoxic Respiratory Failure /  Septic Shock   Imaging shows inflitrates and pleural effusions; UC showing ESBL UTI; C. Diff studies are indeterminate  IV Zosyn and ID on board   Off Pressors and hemodynamics improved; On Vent via Trach   Arterial Line removed    Acute Blood Loss Anemia   S/P 1U PRBC Transfusion and started on Folic Acid   Has Anemia of Chronic Disease   Hematology consult noted     Acute Renal Failure  Improved and from sepsis   Monitor BMP and Electrolytes     Diabetes type II  Insulin Corrective Scale  Finger sticks per routine (have been in the low 100s);   Hypoglycemia protocol    Quadriplegia  c/w nursing care for all ADLs    COPD  c/w inhalers   pulm (Jaime), recs appreciated    GERD  c/w carafate and PPI     Diet  PEG Tube malfunction; GI notified for exchange  Tube feeds once PEG tube is adressed     Disposition  Discharge planning pending hospital course

## 2022-04-26 NOTE — PROGRESS NOTE ADULT - ASSESSMENT
76 year old female admitted for Acute on Chronic Respiratory Failure secondary to pna also found to have UTI and brief  septic shock.     -Patient currently on Full vent support will titrate settings to maintain SaO2 >90%, or pH >7.25  -consider low titdal volume ventilation strategy w/ goal Tv 4-6 cc/kg of ideal body weight  -VAP prophylaxis with HOB 30 degrees   -aggressive chest PT and suctioning   -continue  antibiotics ( Zosyn)  for Pneumonia and ESBL UTI  -shock state resolved remains of pressors and hemodynamically stable h   -S/P 1U PRBC Transfusion for anemia and started on Folic Acid for anemia of chronic illness   -RYAN has Improved and from sepsis will conintue to Hold nephrotoxic meds, trend urine output and follow  BUN/Creatinine/electrolytes    -Insulin Corrective Scale Finger sticks per routine    -continue with nursing care for all ADLs due to Quadriplegia  -PEG Tube malfunction; GI for exchange    Time spent: 30 mins assessing presenting problems ,  plan of daily care, reviewing data, reviewing radiology, discussing with multidisciplinary team, non inclusive of procedures, discussing goals of care with patient/family

## 2022-04-26 NOTE — CONSULT NOTE ADULT - SUBJECTIVE AND OBJECTIVE BOX
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 Patient unable to contribute to hx due to mental status.    In the ED: wbc 14.15, HGB 6.0, INR 1.40, sodium 134, cr 1.36, lactate 2.8. UA sig for mod LE, large blood, wbc, bacteria. Given Zosyn and ns.      Review of Systems:  Review of Systems: Unable to obtain meaningful ROS due to mental status  peg not working    Allergies:  codeine (Hives)    Medications:  acetaminophen     Tablet .. 650 milliGRAM(s) Oral every 6 hours PRN  ALBUTerol    90 MICROgram(s) HFA Inhaler 2 Puff(s) Inhalation every 6 hours PRN  chlorhexidine 0.12% Liquid 15 milliLiter(s) Oral Mucosa every 12 hours  chlorhexidine 2% Cloths 1 Application(s) Topical daily  collagenase Ointment 1 Application(s) Topical daily  dextrose 5%. 1000 milliLiter(s) IV Continuous <Continuous>  dextrose 5%. 1000 milliLiter(s) IV Continuous <Continuous>  dextrose 50% Injectable 25 Gram(s) IV Push once  dextrose 50% Injectable 12.5 Gram(s) IV Push once  dextrose 50% Injectable 25 Gram(s) IV Push once  dextrose Oral Gel 15 Gram(s) Oral once PRN  folic acid 1 milliGRAM(s) Oral daily  glucagon  Injectable 1 milliGRAM(s) IntraMuscular once  heparin   Injectable 5000 Unit(s) SubCutaneous every 12 hours  insulin lispro (ADMELOG) corrective regimen sliding scale   SubCutaneous every 6 hours  lactobacillus acidophilus 1 Tablet(s) Oral two times a day  LORazepam     Tablet 1 milliGRAM(s) Oral every 4 hours  LORazepam   Injectable 1 milliGRAM(s) IV Push every 6 hours PRN  methocarbamol 500 milliGRAM(s) Oral two times a day  mineral oil/petrolatum Hydrophilic Ointment 1 Application(s) Topical daily  pantoprazole  Injectable 40 milliGRAM(s) IV Push daily  piperacillin/tazobactam IVPB.. 3.375 Gram(s) IV Intermittent every 8 hours  polyethylene glycol 3350 17 Gram(s) Oral two times a day  povidone iodine 10% Ointment 1 Application(s) Topical every 12 hours  senna 2 Tablet(s) Oral at bedtime  simethicone 80 milliGRAM(s) Chew every 6 hours  sodium chloride 0.9% lock flush 10 milliLiter(s) IV Push every 1 hour PRN  sucralfate 1 Gram(s) Oral four times a day      PMHX/PSHX:  Dementia of frontal lobe type    Aphasic stroke    Diabetes mellitus    Respiratory failure    Hypertension    GERD (gastroesophageal reflux disease)    Constipation    Respiratory failure    CVA (cerebral vascular accident)    HTN (hypertension)    DM (diabetes mellitus)    Advanced dementia    COVID-19 virus detected    Quadriplegia    Pneumonia    Type II diabetes mellitus    Hx of appendectomy    Gastrostomy in place    Tracheostomy in place    Tracheostomy tube present    Feeding by G-tube        Family history:  No pertinent family history in first degree relatives    No pertinent family history in first degree relatives    No pertinent family history in first degree relatives        Social History:     ROS:     General:  No wt loss, fevers, chills, night sweats, fatigue,   Eyes:  Good vision, no reported pain  ENT:  No sore throat, pain, runny nose, dysphagia  CV:  No pain, palpitations, hypo/hypertension  Resp:  No dyspnea, cough, tachypnea, wheezing  GI:  No pain, No nausea, No vomiting, No diarrhea, No constipation, No weight loss, No fever, No pruritis, No rectal bleeding, No tarry stools, No dysphagia,  :  No pain, bleeding, incontinence, nocturia  Muscle:  No pain, weakness  Neuro:  No weakness, tingling, memory problems  Psych:  No fatigue, insomnia, mood problems, depression  Endocrine:  No polyuria, polydipsia, cold/heat intolerance  Heme:  No petechiae, ecchymosis, easy bruisability  Skin:  No rash, tattoos, scars, edema      PHYSICAL EXAM:   Vital Signs:  Vital Signs Last 24 Hrs  T(C): 37.4 (2022 08:59), Max: 38.4 (2022 20:27)  T(F): 99.4 (2022 08:59), Max: 101.1 (2022 20:27)  HR: 90 (2022 10:17) (90 - 113)  BP: 121/58 (2022 10:00) (117/66 - 136/75)  BP(mean): 76 (2022 10:00) (74 - 93)  RR: 17 (2022 10:00) (17 - 33)  SpO2: 98% (2022 10:17) (95% - 100%)  Daily     Daily Weight in k.9 (2022 04:07)    GENERAL:  Appears stated age, well-groomed, well-nourished, no distress  HEENT:  NC/AT,  conjunctivae clear and pink, no thyromegaly, nodules, adenopathy, no JVD, sclera -anicteric  CHEST:  Full & symmetric excursion, no increased effort, breath sounds clear  HEART:  Regular rhythm, S1, S2, no murmur/rub/S3/S4, no abdominal bruit, no edema  ABDOMEN:  Soft, non-tender, non-distended, normoactive bowel sounds,  no masses ,no hepato-splenomegaly, no signs of chronic liver disease  EXTEREMITIES:  no cyanosis,clubbing or edema  SKIN:  No rash/erythema/ecchymoses/petechiae/wounds/abscess/warm/dry  NEURO:  Alert, oriented, no asterixis, no tremor, no encephalopathy    LABS:                        7.5    10.48 )-----------( 269      ( 2022 06:39 )             25.3     04-26    140  |  104  |  40<H>  ----------------------------<  244<H>  4.5   |  27  |  1.15    Ca    8.9      2022 06:39    TPro  6.9  /  Alb  1.7<L>  /  TBili  0.4  /  DBili  x   /  AST  14  /  ALT  19  /  AlkPhos  205<H>  04-26    LIVER FUNCTIONS - ( 2022 06:39 )  Alb: 1.7 g/dL / Pro: 6.9 g/dL / ALK PHOS: 205 U/L / ALT: 19 U/L DA / AST: 14 U/L / GGT: x                   Imaging:

## 2022-04-26 NOTE — PROGRESS NOTE ADULT - ASSESSMENT
75 y/o F with PMH of HTN, DM type 2, CVA, recent Cardiac Arrest, Chronic Respiratory Failure, Vent Dependant s/p trach & PEG, Anemia with GI blood loss, and Dementia who was sent from [facility] facility for acute respiratory distress, fevers, hypotension. Patient unable to contribute to hx due to mental status.  In the ED: wbc 14.15, HGB 6.0, INR 1.40, sodium 134, cr 1.36, lactate 2.8. UA sig for mod LE, large blood, wbc, bacteria. Given Zosyn and ns.     RECOMMENDATIONS  Cx from 4/21 reviewed:  sputum cx noted, f/u pseudomonas and serratia sensitivities  urine cx w/ ESBL E Coli but with low PRATIK to zosyn and zosyn concentrates in the urine  leukocytosis resolved; off pressors  C/w zosyn for now  further recs pending above sensitivities  f/u blood cx-NGTD    Diarrhea:  C. diff indeterminate  nurse reports stool is becoming more formed  however, if diarrhea persists would repeat test    Emil Medellin M.D.  [facility], Division of Infectious Diseases  [phone]  After 5pm on weekdays and all day on weekends - please call [phone]

## 2022-04-26 NOTE — PROGRESS NOTE ADULT - ASSESSMENT
The patient is a 76 year old female with a history of HTN, DM, CVA, dementia, chronic respiratory failure s/p trach, PEG, cardiac arrest, anemia who presented with respiratory distress, fevers, hypotension.    Plan:  - Echo 9/21 with normal LV systolic function, mod pulm HTN  - CT chest with bilateral infiltrates and pleural effusions  - Weaned off of norepinephrine  - Hemoglobin low but stable  - On zosyn  - Mechanical ventilation  - Overall prognosis remains poor

## 2022-04-26 NOTE — CONSULT NOTE ADULT - PROVIDER SPECIALTY LIST ADULT
Palliative Care
Critical Care
Cardiology
Critical Care
Infectious Disease
Gastroenterology
Diabetes
Heme/Onc
Wound Care

## 2022-04-26 NOTE — PROGRESS NOTE ADULT - ASSESSMENT
VIRIDIANAAMI BREA 75 f [facility] S 4/21/2022   DR ILIANA KEMP     REVIEW OF SYMPTOMS      Able to give (reliable) ROS  NO     PHYSICAL EXAM    HEENT Unremarkable  atraumatic   RESP Fair air entry EXP prolonged    Harsh breath sound Resp distres mild   CARDIAC S1 S2 No S3     NO JVD    ABDOMEN SOFT BS PRESENT NOT DISTENDED No hepatosplenomegaly   PEDAL EDEMA present No calf tenderness  NO rash       DOA/CC/PROBLEMS poa .  76 f   from [facility]  doa 4/21/2022  cc fever hypotension    PRESENTING PROBLEMS 4/21 4/23/2022   Sepsis   Lacticemia La 2.8   Anemia Hb 6   RYAN Cr 1.3     PMH-PSH .  Pneumonia  HTN, DM, CVA, dementia, chronic respiratory failure s/p trach, PEG, cardiac arrest    COVID/ICU/CODE STATUS.                       COVID  STATUS.           ICU STAY. none  GOC.      BEST PRACTICE ISSUES.                                                  HEAD OF BED ELEVATION. Yes  DVT PROPHYLAXIS.    4/21 hpsc   INFECTION PROPHYLAXIS.   4/22 chlorhexidine 2%   4/21/2022 chlorhexidine .12%    SQUIRES PROPHYLAXIS.  4/21 protonix    4/21 carafate                                                                                      DIET.  4/22 glucerna 1.5 1200 gt               VITALS/PO/IO/VENT/DRIPS.  4/26/2022 afeb 92 120/60   4/26/2022 ac 18/400/5/.4      PROBLEM DATA/ASSESSMENT RECOMMENDATIONS (A/R).    HEMODYNAMICS.   Monitor bp Target MAP 65 (+)    RESP.   Monitor po Target po 90-95%      PMH/PSH PROBLEMS.  Management continued/modified as indicated    VENT MANAGEMENT.   HOB elevation  Target Pplat 30 (-)  Target PO 90-95%  Target pH 730 (+)  Daily spontaneous breathing trials   Daily sedation vacation         INFECTION SOURCE.   VAP   INFECTION A/R.   4/22 C diff indet   4/21 urine c 100k e coli ESBL  ID Dr BRITTANY Brantley/Dr Medellin on case aware  Is on zosyn    INFECTION AUGUST.  W 4/21-4/22-4/23-4/24-4/25-4/26/2022 w 14 - 15-11-9.3 - 6.8 - 10.4      SHOCK.  4/21 norepi  4/22/2022 wened off nore     LACTICEMIA.  La 4/21-4/22/2022 la 2.8 - 1.2     COPD.  4/21 albuterol  4/21 spiriva   under control    ANEMIA.   Hb 4/21-4/22-4/23-4/24-4/25-4/26/2022 hb 6 - 8.4 -7.7 - 7.5 - 7.1 - 7.5   Monitro serial    TRANSFUSION  4/21 1 u prbc       HYPONATREMIA.  Na 4/21-4/22-4/23-4/24-4/26/2022 na 134- 133-131-133 - 140   4/23/2022 nacl added     RYAN.  Cr 4/21-4/22-4/24/2022 Cr 1.3 - 1.1-1.1      AMS.  ct 4/21 ct head (-)    ANXIETY.  4/23/2022 lorazepam 1.4p  4/21 lorazepam 1.6     SPASMS   4/21 methocarbamol 500.2     TIME SPENT   Over 36 minutes aggregate critical care time spent on encounter; activities included   direct patient care, counseling and/or coordinating care reviewing notes, lab data/ imaging , discussion with multidisciplinary team/ patient  /family and explaining in detail risks, benefits, alternatives  of the recommendations     CHAPINCITO GUIDRY 75 f [place] S 4/21/2022   DR ILIANA KEMP

## 2022-04-26 NOTE — PROGRESS NOTE ADULT - SUBJECTIVE AND OBJECTIVE BOX
76y  Female  codeine (Hives)    CC: Patient is a 76y old  Female who presents with a chief complaint of acute respiratory distress, fevers, hypotension.    HPI: 76 year old female PMHx of  advanced dementia s/p PEG with severe contractures, hx of cardiac arrest with chronic resp failure s/p trach/PEG,  hx of subarachnoid hemorrhage, hx of CVA, COPD , HTN, DM2, GERD who was admitted on 4/21 from local skilled nursing facility for acute respiratory distress found to have multifocal pneumonia, ESBL e coli UTI and was in brief septic shock. Toning she remains on full vent support, she is no longer requiring IV vasopressor therapy. Leukocytosis resolved , the sputum culture was found to have pseudomonas and serratia sensitivities, blood cultures were negative, the urine is consistent with ESBL E Coli and continuing on Zosyn       PAST MEDICAL & SURGICAL HISTORY:  Dementia of frontal lobe type  Aphasic stroke  Diabetes mellitus  Respiratory failure  Hypertension  GERD (gastroesophageal reflux disease)  Constipation  Respiratory failure  CVA (cerebral vascular accident)  HTN (hypertension)  DM (diabetes mellitus)  Advanced dementia  COVID-19 virus detected  Quadriplegia  Pneumonia  Type II diabetes mellitus  Hx of appendectomy  Gastrostomy in place  Tracheostomy in place  Tracheostomy tube present  Feeding by G-tube    FAMILY HISTORY:  No pertinent family history in first degree relatives    Vital Signs Last 24 Hrs  T(C): 37.1 (26 Apr 2022 17:00), Max: 37.4 (26 Apr 2022 08:59)  T(F): 98.7 (26 Apr 2022 17:00), Max: 99.4 (26 Apr 2022 08:59)  HR: 90 (26 Apr 2022 20:30) (78 - 106)  BP: 118/72 (26 Apr 2022 19:00) (102/66 - 135/73)  BP(mean): 87 (26 Apr 2022 19:00) (71 - 92)  RR: 18 (26 Apr 2022 19:00) (17 - 30)  SpO2: 97% (26 Apr 2022 20:30) (95% - 100%)    I&O's Summary  25 Apr 2022 07:01  -  26 Apr 2022 07:00  --------------------------------------------------------  IN: 2140 mL / OUT: 1500 mL / NET: 640 mL    26 Apr 2022 07:01  -  26 Apr 2022 21:00  --------------------------------------------------------  IN: 85 mL / OUT: 700 mL / NET: -615 mL    LABS  04-26  140  |  104  |  40<H>  ----------------------------<  244<H>  4.5   |  27  |  1.15  Ca    8.9      26 Apr 2022 06:39  TPro  6.9  /  Alb  1.7<L>  /  TBili  0.4  /  DBili  x   /  AST  14  /  ALT  19  /  AlkPhos  205<H>  04-26                     7.5    10.48 )-----------( 269      ( 26 Apr 2022 06:39 )             25.3     LIVER FUNCTIONS - ( 26 Apr 2022 06:39 )  Alb: 1.7 g/dL / Pro: 6.9 g/dL / ALK PHOS: 205 U/L / ALT: 19 U/L DA / AST: 14 U/L / GGT: x           CAPILLARY BLOOD GLUCOSE  POCT Blood Glucose.: 205 mg/dL (26 Apr 2022 17:11)    VENT SETTINGS   Mode: AC/ CMV (Assist Control/ Continuous Mandatory Ventilation)  RR (machine): 18  TV (machine): 400  FiO2: 40  PEEP: 5  ITime: 1  MAP: 15  PIP: 38    MEDICATIONS  (STANDING):  chlorhexidine 0.12% Liquid 15 milliLiter(s) Oral Mucosa every 12 hours  chlorhexidine 2% Cloths 1 Application(s) Topical daily  collagenase Ointment 1 Application(s) Topical daily  dextrose 5%. 1000 milliLiter(s) (50 mL/Hr) IV Continuous <Continuous>  dextrose 5%. 1000 milliLiter(s) (100 mL/Hr) IV Continuous <Continuous>  dextrose 50% Injectable 25 Gram(s) IV Push once  dextrose 50% Injectable 12.5 Gram(s) IV Push once  dextrose 50% Injectable 25 Gram(s) IV Push once  folic acid 1 milliGRAM(s) Oral daily  glucagon  Injectable 1 milliGRAM(s) IntraMuscular once  heparin   Injectable 5000 Unit(s) SubCutaneous every 12 hours  insulin lispro (ADMELOG) corrective regimen sliding scale   SubCutaneous every 6 hours  lactobacillus acidophilus 1 Tablet(s) Oral two times a day  LORazepam     Tablet 1 milliGRAM(s) Oral every 4 hours  methocarbamol 500 milliGRAM(s) Oral two times a day  mineral oil/petrolatum Hydrophilic Ointment 1 Application(s) Topical daily  pantoprazole  Injectable 40 milliGRAM(s) IV Push daily  piperacillin/tazobactam IVPB.. 3.375 Gram(s) IV Intermittent every 8 hours  polyethylene glycol 3350 17 Gram(s) Oral two times a day  povidone iodine 10% Ointment 1 Application(s) Topical every 12 hours  senna 2 Tablet(s) Oral at bedtime  simethicone 80 milliGRAM(s) Chew every 6 hours  sucralfate 1 Gram(s) Oral four times a day    Physicial Exam:     HEENT: PERRLA, EOMI, no drainage or redness  Neck:  No JVD  Respiratory: Breath Sounds equal & clear to percussion & auscultation, no accessory muscle use  Cardiovascular: Regular rate & rhythm, normal S1, S2; no murmurs, gallops or rubs; no S3, S4  Gastrointestinal: Soft, non-tender, non distended no hepatosplenomegaly, normal bowel sounds  Extremities: No peripheral edema, No cyanosis, clubbing   Vascular: Equal and normal pulses: 2+ peripheral pulses throughout  Neurological: no sensory, motor  deficits, normal reflexes  Musculoskeletal: No joint pain, swelling or deformity; no limitation of movement  Skin: No rashes

## 2022-04-26 NOTE — CONSULT NOTE ADULT - CONSULT REASON
Wound consult
evaluation of anemia D64.89
76y A1C with Estimated Average Glucose Result: 6.4 % (04-22-22 @ 12:14)   diabetes mellitus uncontrolled type 2
Acute on chronic respiratory failure
Emergent Central line access pt in septic shock
UTI
peg dysfunction
seps
GOC

## 2022-04-26 NOTE — CONSULT NOTE ADULT - SUBJECTIVE AND OBJECTIVE BOX
Patient is a 76y old  Female who presents with a chief complaint of Anemia (26 Apr 2022 12:48)    Type: T2 DM DX year unknown home regiment 36 units basaglar @ HS. 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 Patient unable to contribute to hx due to mental status.    In the ED: wbc 14.15, HGB 6.0, INR 1.40, sodium 134, cr 1.36, lactate 2.8. UA sig for mod LE, large blood, wbc, bacteria. Given Zosyn and ns.  (21 Apr 2022 14:02)      PAST MEDICAL & SURGICAL HISTORY:  Dementia of frontal lobe type    Aphasic stroke    Diabetes mellitus    Respiratory failure    Hypertension    GERD (gastroesophageal reflux disease)    Constipation    Respiratory failure    CVA (cerebral vascular accident)    HTN (hypertension)    DM (diabetes mellitus)    Advanced dementia    COVID-19 virus detected    Quadriplegia    Pneumonia    Type II diabetes mellitus    Hx of appendectomy    Gastrostomy in place    Tracheostomy in place    Tracheostomy tube present    Feeding by G-tube        REVIEW OF SYSTEMS  unable to obtain meaningful ROS due to mental status      Allergies    codeine (Hives)    Intolerances        MEDICATIONS  (STANDING):  chlorhexidine 0.12% Liquid 15 milliLiter(s) Oral Mucosa every 12 hours  chlorhexidine 2% Cloths 1 Application(s) Topical daily  collagenase Ointment 1 Application(s) Topical daily  dextrose 5%. 1000 milliLiter(s) (50 mL/Hr) IV Continuous <Continuous>  dextrose 5%. 1000 milliLiter(s) (100 mL/Hr) IV Continuous <Continuous>  dextrose 50% Injectable 25 Gram(s) IV Push once  dextrose 50% Injectable 12.5 Gram(s) IV Push once  dextrose 50% Injectable 25 Gram(s) IV Push once  folic acid 1 milliGRAM(s) Oral daily  glucagon  Injectable 1 milliGRAM(s) IntraMuscular once  heparin   Injectable 5000 Unit(s) SubCutaneous every 12 hours  insulin glargine Injectable (LANTUS) 10 Unit(s) SubCutaneous once  insulin lispro (ADMELOG) corrective regimen sliding scale   SubCutaneous every 6 hours  lactobacillus acidophilus 1 Tablet(s) Oral two times a day  LORazepam     Tablet 1 milliGRAM(s) Oral every 4 hours  methocarbamol 500 milliGRAM(s) Oral two times a day  mineral oil/petrolatum Hydrophilic Ointment 1 Application(s) Topical daily  pantoprazole  Injectable 40 milliGRAM(s) IV Push daily  piperacillin/tazobactam IVPB.. 3.375 Gram(s) IV Intermittent every 8 hours  polyethylene glycol 3350 17 Gram(s) Oral two times a day  povidone iodine 10% Ointment 1 Application(s) Topical every 12 hours  senna 2 Tablet(s) Oral at bedtime  simethicone 80 milliGRAM(s) Chew every 6 hours  sucralfate 1 Gram(s) Oral four times a day

## 2022-04-26 NOTE — PROGRESS NOTE ADULT - SUBJECTIVE AND OBJECTIVE BOX
Subjective: Patient seen and examined. Overnight PEG Tube clogged.      MEDICATIONS  (STANDING):  chlorhexidine 0.12% Liquid 15 milliLiter(s) Oral Mucosa every 12 hours  chlorhexidine 2% Cloths 1 Application(s) Topical daily  collagenase Ointment 1 Application(s) Topical daily  dextrose 5%. 1000 milliLiter(s) (100 mL/Hr) IV Continuous <Continuous>  dextrose 5%. 1000 milliLiter(s) (50 mL/Hr) IV Continuous <Continuous>  dextrose 50% Injectable 25 Gram(s) IV Push once  dextrose 50% Injectable 12.5 Gram(s) IV Push once  dextrose 50% Injectable 25 Gram(s) IV Push once  folic acid 1 milliGRAM(s) Oral daily  glucagon  Injectable 1 milliGRAM(s) IntraMuscular once  heparin   Injectable 5000 Unit(s) SubCutaneous every 12 hours  insulin lispro (ADMELOG) corrective regimen sliding scale   SubCutaneous every 6 hours  lactobacillus acidophilus 1 Tablet(s) Oral two times a day  LORazepam     Tablet 1 milliGRAM(s) Oral every 4 hours  methocarbamol 500 milliGRAM(s) Oral two times a day  mineral oil/petrolatum Hydrophilic Ointment 1 Application(s) Topical daily  pantoprazole  Injectable 40 milliGRAM(s) IV Push daily  piperacillin/tazobactam IVPB.. 3.375 Gram(s) IV Intermittent every 8 hours  polyethylene glycol 3350 17 Gram(s) Oral two times a day  povidone iodine 10% Ointment 1 Application(s) Topical every 12 hours  senna 2 Tablet(s) Oral at bedtime  simethicone 80 milliGRAM(s) Chew every 6 hours  sucralfate 1 Gram(s) Oral four times a day    MEDICATIONS  (PRN):  acetaminophen     Tablet .. 650 milliGRAM(s) Oral every 6 hours PRN Temp greater or equal to 38C (100.4F)  ALBUTerol    90 MICROgram(s) HFA Inhaler 2 Puff(s) Inhalation every 6 hours PRN Shortness of Breath and/or Wheezing  dextrose Oral Gel 15 Gram(s) Oral once PRN Blood Glucose LESS THAN 70 milliGRAM(s)/deciliter  LORazepam   Injectable 1 milliGRAM(s) IV Push every 6 hours PRN Anxiety  sodium chloride 0.9% lock flush 10 milliLiter(s) IV Push every 1 hour PRN Pre/post blood products, medications, blood draw, and to maintain line patency      Allergies    codeine (Hives)    Intolerances        Vital Signs Last 24 Hrs  T(C): 37.4 (26 Apr 2022 08:59), Max: 38.4 (25 Apr 2022 20:27)  T(F): 99.4 (26 Apr 2022 08:59), Max: 101.1 (25 Apr 2022 20:27)  HR: 90 (26 Apr 2022 10:17) (90 - 113)  BP: 121/58 (26 Apr 2022 10:00) (103/52 - 136/75)  BP(mean): 76 (26 Apr 2022 10:00) (67 - 93)  RR: 17 (26 Apr 2022 10:00) (17 - 33)  SpO2: 98% (26 Apr 2022 10:17) (95% - 100%)    PHYSICAL EXAM:  GENERAL: NAD On ventillator   HEAD:  Atraumatic, Normocephalic  ENMT: Trach to Vent   NECK: Supple, No JVD, Normal thyroid  CHEST/LUNG: Clear to auscultation bilaterally; No rales, rhonchi, wheezing, or rubs  HEART: Regular rate and rhythm; No murmurs, rubs, or gallops  ABDOMEN: Soft, Nontender, PEG Tube   EXTREMITIES:  2+ Peripheral Pulses, No clubbing, cyanosis, or edema      LABS:                        7.5    10.48 )-----------( 269      ( 26 Apr 2022 06:39 )             25.3     26 Apr 2022 06:39    140    |  104    |  40     ----------------------------<  244    4.5     |  27     |  1.15     Ca    8.9        26 Apr 2022 06:39    TPro  6.9    /  Alb  1.7    /  TBili  0.4    /  DBili  x      /  AST  14     /  ALT  19     /  AlkPhos  205    26 Apr 2022 06:39        CAPILLARY BLOOD GLUCOSE      POCT Blood Glucose.: 203 mg/dL (26 Apr 2022 05:28)  POCT Blood Glucose.: 257 mg/dL (25 Apr 2022 23:24)  POCT Blood Glucose.: 250 mg/dL (25 Apr 2022 17:11)  POCT Blood Glucose.: 233 mg/dL (25 Apr 2022 11:22)      RADIOLOGY & ADDITIONAL TESTS:    Imaging Personally Reviewed:  [ ] YES     Consultant(s) Notes Reviewed:      Care Discussed with Consultants/Other Providers:    Advanced Directives: [ ] DNR  [ ] No feeding tube  [ ] MOLST in chart  [ ] MOLST completed today  [ ] Unknown

## 2022-04-26 NOTE — PROGRESS NOTE ADULT - SUBJECTIVE AND OBJECTIVE BOX
Chief Complaint: Respiratory failure    Interval Events: No events overnight.    Review of Systems:  Unable to obtain    Physical Exam:  Vital Signs Last 24 Hrs  T(C): 37.4 (26 Apr 2022 08:59), Max: 38.4 (25 Apr 2022 20:27)  T(F): 99.4 (26 Apr 2022 08:59), Max: 101.1 (25 Apr 2022 20:27)  HR: 94 (26 Apr 2022 09:18) (94 - 113)  BP: 118/61 (26 Apr 2022 09:18) (103/52 - 136/75)  BP(mean): 77 (26 Apr 2022 09:18) (67 - 111)  RR: 20 (26 Apr 2022 09:18) (18 - 33)  SpO2: 99% (26 Apr 2022 09:18) (94% - 100%)  General: NAD  HEENT: Trach  Neck: No JVD, no carotid bruit  Lungs: Coarse bilaterally  CV: RRR, nl S1/S2, no M/R/G  Abdomen: S/NT/ND, +BS  Extremities: No LE edema, no cyanosis  Neuro: AAOx0  Skin: No rash    Labs:             04-26    140  |  104  |  40<H>  ----------------------------<  244<H>  4.5   |  27  |  1.15    Ca    8.9      26 Apr 2022 06:39    TPro  6.9  /  Alb  1.7<L>  /  TBili  0.4  /  DBili  x   /  AST  14  /  ALT  19  /  AlkPhos  205<H>  04-26                        7.5    10.48 )-----------( 269      ( 26 Apr 2022 06:39 )             25.3     Telemetry: Sinus rhythm

## 2022-04-26 NOTE — PROGRESS NOTE ADULT - SUBJECTIVE AND OBJECTIVE BOX
BREA BECKHAM    [facility]    Allergies    codeine (Hives)    Intolerances        PAST MEDICAL & SURGICAL HISTORY:  Dementia of frontal lobe type    Aphasic stroke    Diabetes mellitus    Respiratory failure    Hypertension    GERD (gastroesophageal reflux disease)    Constipation    Respiratory failure    CVA (cerebral vascular accident)    HTN (hypertension)    DM (diabetes mellitus)    Advanced dementia    COVID-19 virus detected    Quadriplegia    Pneumonia    Type II diabetes mellitus    Hx of appendectomy    Gastrostomy in place    Tracheostomy in place    Tracheostomy tube present    Feeding by G-tube        FAMILY HISTORY:  No pertinent family history in first degree relatives        Home Medications:  albuterol 90 mcg/inh inhalation aerosol with adapter: 2  inhaled every 6 hours (21 Apr 2022 14:01)  Bacid (LAC) oral tablet: 2 tab(s) by gastrostomy tube once a day (21 Apr 2022 13:45)  Basaglar KwikPen 100 units/mL subcutaneous solution: 36 unit(s) subcutaneous once a day (at bedtime) (21 Apr 2022 14:01)  Betadine 10% topical swab: cleanse right and left great toes (21 Apr 2022 14:01)  Carafate 1 g/10 mL oral suspension: 10 milliliter(s) by gastrostomy tube 4 times a day (before meals and at bedtime) for 14 days (Started 6/4/21) (21 Apr 2022 13:45)  chlorhexidine 0.12% mucous membrane liquid: 15 milliliter(s) mucous membrane 2 times a day (21 Apr 2022 13:45)  Eucerin topical cream: Apply topically to affected area once a day bilateral feet (21 Apr 2022 14:01)  ipratropium-albuterol 0.5 mg-2.5 mg/3 mL inhalation solution: 3 milliliter(s) inhaled 4 times a day (21 Apr 2022 13:45)  LORazepam 1 mg oral tablet: 1 tab(s) by gastrostomy tube every 4 hours (21 Apr 2022 14:01)  methocarbamol 500 mg oral tablet: 1 tab(s) by gastrostomy tube 2 times a day (21 Apr 2022 14:01)  pantoprazole: 20 milliliter(s) by gastrostomy tube 2 times a day (21 Apr 2022 14:01)  polyethylene glycol 3350 oral powder for reconstitution: 17 gram(s) by gastrostomy tube every 12 hours (21 Apr 2022 14:01)  senna 8.6 mg oral tablet: 3 tab(s) by gastrostomy tube once a day (at bedtime) (21 Apr 2022 13:45)  silver sulfADIAZINE 1% topical cream: Apply topically to affected area once a day to sacrum (21 Apr 2022 14:01)  simethicone 80 mg oral tablet, chewable: 1 tab(s) by gastrostomy tube every 6 hours (21 Apr 2022 14:01)  Tylenol 325 mg oral tablet: 2 tab(s) by gastrostomy tube once a day; 60 minutes prior to dressing change  (21 Apr 2022 13:45)  Tylenol 325 mg oral tablet: 2 tab(s) by gastrostomy tube every 6 hours, As Needed (21 Apr 2022 14:01)      MEDICATIONS  (STANDING):  chlorhexidine 0.12% Liquid 15 milliLiter(s) Oral Mucosa every 12 hours  chlorhexidine 2% Cloths 1 Application(s) Topical daily  collagenase Ointment 1 Application(s) Topical daily  dextrose 5%. 1000 milliLiter(s) (50 mL/Hr) IV Continuous <Continuous>  dextrose 5%. 1000 milliLiter(s) (100 mL/Hr) IV Continuous <Continuous>  dextrose 50% Injectable 25 Gram(s) IV Push once  dextrose 50% Injectable 12.5 Gram(s) IV Push once  dextrose 50% Injectable 25 Gram(s) IV Push once  folic acid 1 milliGRAM(s) Oral daily  glucagon  Injectable 1 milliGRAM(s) IntraMuscular once  heparin   Injectable 5000 Unit(s) SubCutaneous every 12 hours  insulin lispro (ADMELOG) corrective regimen sliding scale   SubCutaneous every 6 hours  lactobacillus acidophilus 1 Tablet(s) Oral two times a day  LORazepam     Tablet 1 milliGRAM(s) Oral every 4 hours  methocarbamol 500 milliGRAM(s) Oral two times a day  mineral oil/petrolatum Hydrophilic Ointment 1 Application(s) Topical daily  pantoprazole   Suspension 40 milliGRAM(s) Oral daily  piperacillin/tazobactam IVPB.. 3.375 Gram(s) IV Intermittent every 8 hours  polyethylene glycol 3350 17 Gram(s) Oral two times a day  povidone iodine 10% Ointment 1 Application(s) Topical every 12 hours  senna 2 Tablet(s) Oral at bedtime  simethicone 80 milliGRAM(s) Chew every 6 hours  sucralfate 1 Gram(s) Oral four times a day    MEDICATIONS  (PRN):  acetaminophen     Tablet .. 650 milliGRAM(s) Oral every 6 hours PRN Temp greater or equal to 38C (100.4F)  ALBUTerol    90 MICROgram(s) HFA Inhaler 2 Puff(s) Inhalation every 6 hours PRN Shortness of Breath and/or Wheezing  dextrose Oral Gel 15 Gram(s) Oral once PRN Blood Glucose LESS THAN 70 milliGRAM(s)/deciliter  LORazepam   Injectable 1 milliGRAM(s) IV Push every 6 hours PRN Anxiety  sodium chloride 0.9% lock flush 10 milliLiter(s) IV Push every 1 hour PRN Pre/post blood products, medications, blood draw, and to maintain line patency      Diet, NPO with Tube Feed:   Tube Feeding Modality: Gastrostomy  Glucerna 1.5 Horacio  Total Volume for 24 Hours (mL): 1200  Continuous  Starting Tube Feed Rate mL per Hour: 20  Increase Tube Feed Rate by (mL): 10     Every 6 hours  Until Goal Tube Feed Rate (mL per Hour): 60  Tube Feed Duration (in Hours): 20  Tube Feed Start Time: 00:00  Free Water Flush  Pump   Rate (mL per Hour): 30   Frequency: Every Hour    Duration (Hours): 24 (04-22-22 @ 12:49) [Active]          Vital Signs Last 24 Hrs  T(C): 37.4 (26 Apr 2022 08:59), Max: 38.4 (25 Apr 2022 20:27)  T(F): 99.4 (26 Apr 2022 08:59), Max: 101.1 (25 Apr 2022 20:27)  HR: 94 (26 Apr 2022 09:18) (94 - 113)  BP: 118/61 (26 Apr 2022 09:18) (103/52 - 136/75)  BP(mean): 77 (26 Apr 2022 09:18) (67 - 111)  RR: 20 (26 Apr 2022 09:18) (18 - 33)  SpO2: 99% (26 Apr 2022 09:18) (94% - 100%)      04-25-22 @ 07:01  -  04-26-22 @ 07:00  --------------------------------------------------------  IN: 2140 mL / OUT: 1500 mL / NET: 640 mL    04-26-22 @ 07:01  -  04-26-22 @ 09:56  --------------------------------------------------------  IN: 85 mL / OUT: 0 mL / NET: 85 mL        Mode: AC/ CMV (Assist Control/ Continuous Mandatory Ventilation), RR (machine): 18, TV (machine): 400, FiO2: 40, PEEP: 5, ITime: 1, MAP: 14, PIP: 41      LABS:                        7.5    10.48 )-----------( 269      ( 26 Apr 2022 06:39 )             25.3     04-26    140  |  104  |  40<H>  ----------------------------<  244<H>  4.5   |  27  |  1.15    Ca    8.9      26 Apr 2022 06:39    TPro  6.9  /  Alb  1.7<L>  /  TBili  0.4  /  DBili  x   /  AST  14  /  ALT  19  /  AlkPhos  205<H>  04-26              WBC:  WBC Count: 10.48 K/uL (04-26 @ 06:39)  WBC Count: 6.81 K/uL (04-25 @ 05:58)  WBC Count: 9.33 K/uL (04-24 @ 05:50)  WBC Count: 11.35 K/uL (04-23 @ 06:07)      MICROBIOLOGY:  RECENT CULTURES:  04-24 .Sputum Sputum XXXX   Few Squamous epithelial cells per low power field  Moderate polymorphonuclear leukocytes per low power field  Moderate Gram Negative Rods per oil power field   Moderate Pseudomonas aeruginosa Susceptibility to follow.  Moderate Serratia marcescens Susceptibility to follow.  Normal Respiratory Elvira present    04-22 Skin Skin XXXX XXXX   Culture yields growth of greater than 3 colony types of  Call client services within 7 days if further workup is clinically  indicated.    04-21 Clean Catch Clean Catch (Midstream) Escherichia coli ESBL XXXX   >100,000 CFU/ml Escherichia coli ESBL    04-21 .Blood Blood-Peripheral XXXX XXXX   No growth to date.                    Sodium:  Sodium, Serum: 140 mmol/L (04-26 @ 06:39)  Sodium, Serum: 139 mmol/L (04-25 @ 05:58)  Sodium, Serum: 133 mmol/L (04-24 @ 05:50)  Sodium, Serum: 131 mmol/L (04-23 @ 06:07)      1.15 mg/dL 04-26 @ 06:39  1.12 mg/dL 04-25 @ 05:58  1.15 mg/dL 04-24 @ 05:50  1.20 mg/dL 04-23 @ 06:07      Hemoglobin:  Hemoglobin: 7.5 g/dL (04-26 @ 06:39)  Hemoglobin: 7.1 g/dL (04-25 @ 05:58)  Hemoglobin: 7.5 g/dL (04-24 @ 05:50)  Hemoglobin: 7.7 g/dL (04-23 @ 06:07)      Platelets: Platelet Count - Automated: 269 K/uL (04-26 @ 06:39)  Platelet Count - Automated: 220 K/uL (04-25 @ 05:58)  Platelet Count - Automated: 218 K/uL (04-24 @ 05:50)  Platelet Count - Automated: 210 K/uL (04-23 @ 06:07)      LIVER FUNCTIONS - ( 26 Apr 2022 06:39 )  Alb: 1.7 g/dL / Pro: 6.9 g/dL / ALK PHOS: 205 U/L / ALT: 19 U/L DA / AST: 14 U/L / GGT: x                 RADIOLOGY & ADDITIONAL STUDIES:      MICROBIOLOGY:  RECENT CULTURES:  04-24 .Sputum Sputum XXXX   Few Squamous epithelial cells per low power field  Moderate polymorphonuclear leukocytes per low power field  Moderate Gram Negative Rods per oil power field   Moderate Pseudomonas aeruginosa Susceptibility to follow.  Moderate Serratia marcescens Susceptibility to follow.  Normal Respiratory Elvira present    04-22 Skin Skin XXXX XXXX   Culture yields growth of greater than 3 colony types of  Call client services within 7 days if further workup is clinically  indicated.    04-21 Clean Catch Clean Catch (Midstream) Escherichia coli ESBL XXXX   >100,000 CFU/ml Escherichia coli ESBL    04-21 .Blood Blood-Peripheral XXXX XXXX   No growth to date.

## 2022-04-26 NOTE — PROGRESS NOTE ADULT - ASSESSMENT
77 y/o F with PMH of HTN, DM type 2, CVA, recent Cardiac Arrest, Chronic Respiratory Failure, Vent Dependant s/p trach & PEG, Anemia with GI blood loss, and Dementia who was sent from [facility] facility for acute respiratory distress, fevers, hypotension.    sepsis  hypotension  OP  OA  chr resp failure  cva  hx of card arrest  DM      emp ABX  cx - biomarkers  ct imaging reviewed  labs reviewed  assist with needs    with HCP   pt is full code  old records reviewed  GOC documented

## 2022-04-26 NOTE — PROGRESS NOTE ADULT - SUBJECTIVE AND OBJECTIVE BOX
[facility], Division of Infectious Diseases  MOIZ Lora Y. Patel, S. Shah, G. Casimir  [phone]  ([phone] - weekdays after 5pm and weekends)    Name: BREA BECKHAM  Age/Gender: 76y Female  MRN: [id number]    Interval History:  Patient seen this morning.   Notes reviewed.   No concerning overnight events.  Tmax 100.4 yesterday evening    Allergies: codeine (Hives)      Objective:  Vitals:   T(F): 99.4 (04-26-22 @ 08:59), Max: 101.1 (04-25-22 @ 20:27)  HR: 90 (04-26-22 @ 10:17) (90 - 113)  BP: 121/58 (04-26-22 @ 10:00) (117/66 - 136/75)  RR: 17 (04-26-22 @ 10:00) (17 - 33)  SpO2: 98% (04-26-22 @ 10:17) (95% - 100%)  Physical Examination:  General: no acute distress, somnolent  HEENT: anicteric  Cardio: S1, S2 present, normal rate  Resp: tracheostomy, on vent  Abd: soft, distended, PEG tube  Ext: b/l LE edema  Skin: warm, dry  Lines:    Laboratory Studies:  CBC:                       7.5    10.48 )-----------( 269      ( 26 Apr 2022 06:39 )             25.3     WBC Trend:  10.48 04-26-22 @ 06:39  6.81 04-25-22 @ 05:58  9.33 04-24-22 @ 05:50  11.35 04-23-22 @ 06:07  15.47 04-22-22 @ 06:58  14.93 04-21-22 @ 22:35  14.15 04-21-22 @ 13:04    CMP: 04-26    140  |  104  |  40<H>  ----------------------------<  244<H>  4.5   |  27  |  1.15    Ca    8.9      26 Apr 2022 06:39    TPro  6.9  /  Alb  1.7<L>  /  TBili  0.4  /  DBili  x   /  AST  14  /  ALT  19  /  AlkPhos  205<H>  04-26      LIVER FUNCTIONS - ( 26 Apr 2022 06:39 )  Alb: 1.7 g/dL / Pro: 6.9 g/dL / ALK PHOS: 205 U/L / ALT: 19 U/L DA / AST: 14 U/L / GGT: x               Microbiology: reviewed     Culture - Sputum (collected 04-24-22 @ 12:35)  Source: .Sputum Sputum  Gram Stain (04-24-22 @ 15:10):    Few Squamous epithelial cells per low power field    Moderate polymorphonuclear leukocytes per low power field    Moderate Gram Negative Rods per oil power field  Preliminary Report (04-26-22 @ 09:31):    Moderate Pseudomonas aeruginosa Susceptibility to follow.    Moderate Serratia marcescens Susceptibility to follow.    Normal Respiratory Elvira present    Culture - Other (collected 04-22-22 @ 20:58)  Source: Skin Skin  Final Report (04-25-22 @ 21:04):    Culture yields growth of greater than 3 colony types of    Call client services within 7 days if further workup is clinically    indicated.    Culture - Urine (collected 04-21-22 @ 18:36)  Source: Clean Catch Clean Catch (Midstream)  Final Report (04-24-22 @ 08:46):    >100,000 CFU/ml Escherichia coli ESBL  Organism: Escherichia coli ESBL (04-24-22 @ 08:46)  Organism: Escherichia coli ESBL (04-24-22 @ 08:46)      -  Amikacin: S <=16      -  Amoxicillin/Clavulanic Acid: I 16/8      -  Ampicillin: R >16 These ampicillin results predict results for amoxicillin      -  Ampicillin/Sulbactam: R >16/8 Enterobacter, Klebsiella aerogenes, Citrobacter, and Serratia may develop resistance during prolonged therapy (3-4 days)      -  Aztreonam: R >16      -  Cefazolin: R >16 (MIC_CL_[id number]) For uncomplicated UTI with K. pneumoniae, E. coli, or P. mirablis: PRATIK <=16 is sensitive and PRATIK >=32 is resistant. This also predicts results for oral agents cefaclor, cefdinir, cefpodoxime, cefprozil, cefuroxime axetil, cephalexin and locarbef for uncomplicated UTI. Note that some isolates may be susceptible to these agents while testing resistant to cefazolin.      -  Cefepime: R >16      -  Ceftriaxone: R >32 Enterobacter, Klebsiella aerogenes, Citrobacter, and Serratia may develop resistance during prolonged therapy      -  Ciprofloxacin: R >2      -  Ertapenem: S <=0.5      -  Gentamicin: R >8      -  Imipenem: S <=1      -  Levofloxacin: R 4      -  Meropenem: S <=1      -  Nitrofurantoin: S <=32 Should not be used to treat pyelonephritis      -  Piperacillin/Tazobactam: S <=8      -  Tigecycline: S <=2      -  Tobramycin: R >8      -  Trimethoprim/Sulfamethoxazole: R >2/38      Method Type: PRATIK    Culture - Blood (collected 04-21-22 @ 18:24)  Source: .Blood Blood-Peripheral  Preliminary Report (04-22-22 @ 19:01):    No growth to date.    Culture - Blood (collected 04-21-22 @ 18:24)  Source: .Blood Blood-Peripheral  Preliminary Report (04-22-22 @ 19:01):    No growth to date.      Radiology: reviewed     Medications:  acetaminophen     Tablet .. 650 milliGRAM(s) Oral every 6 hours PRN  ALBUTerol    90 MICROgram(s) HFA Inhaler 2 Puff(s) Inhalation every 6 hours PRN  chlorhexidine 0.12% Liquid 15 milliLiter(s) Oral Mucosa every 12 hours  chlorhexidine 2% Cloths 1 Application(s) Topical daily  collagenase Ointment 1 Application(s) Topical daily  dextrose 5%. 1000 milliLiter(s) IV Continuous <Continuous>  dextrose 5%. 1000 milliLiter(s) IV Continuous <Continuous>  dextrose 50% Injectable 25 Gram(s) IV Push once  dextrose 50% Injectable 12.5 Gram(s) IV Push once  dextrose 50% Injectable 25 Gram(s) IV Push once  dextrose Oral Gel 15 Gram(s) Oral once PRN  folic acid 1 milliGRAM(s) Oral daily  glucagon  Injectable 1 milliGRAM(s) IntraMuscular once  heparin   Injectable 5000 Unit(s) SubCutaneous every 12 hours  insulin lispro (ADMELOG) corrective regimen sliding scale   SubCutaneous every 6 hours  lactobacillus acidophilus 1 Tablet(s) Oral two times a day  LORazepam     Tablet 1 milliGRAM(s) Oral every 4 hours  LORazepam   Injectable 1 milliGRAM(s) IV Push every 6 hours PRN  methocarbamol 500 milliGRAM(s) Oral two times a day  mineral oil/petrolatum Hydrophilic Ointment 1 Application(s) Topical daily  pantoprazole  Injectable 40 milliGRAM(s) IV Push daily  piperacillin/tazobactam IVPB.. 3.375 Gram(s) IV Intermittent every 8 hours  polyethylene glycol 3350 17 Gram(s) Oral two times a day  povidone iodine 10% Ointment 1 Application(s) Topical every 12 hours  senna 2 Tablet(s) Oral at bedtime  simethicone 80 milliGRAM(s) Chew every 6 hours  sodium chloride 0.9% lock flush 10 milliLiter(s) IV Push every 1 hour PRN  sucralfate 1 Gram(s) Oral four times a day    Antimicrobials:  piperacillin/tazobactam IVPB.. 3.375 Gram(s) IV Intermittent every 8 hours

## 2022-04-27 LAB
-  AMIKACIN: SIGNIFICANT CHANGE UP
-  AMIKACIN: SIGNIFICANT CHANGE UP
-  AMOXICILLIN/CLAVULANIC ACID: SIGNIFICANT CHANGE UP
-  AMPICILLIN/SULBACTAM: SIGNIFICANT CHANGE UP
-  AMPICILLIN: SIGNIFICANT CHANGE UP
-  AZTREONAM: SIGNIFICANT CHANGE UP
-  AZTREONAM: SIGNIFICANT CHANGE UP
-  CEFAZOLIN: SIGNIFICANT CHANGE UP
-  CEFEPIME: SIGNIFICANT CHANGE UP
-  CEFEPIME: SIGNIFICANT CHANGE UP
-  CEFOXITIN: SIGNIFICANT CHANGE UP
-  CEFTAZIDIME: SIGNIFICANT CHANGE UP
-  CEFTRIAXONE: SIGNIFICANT CHANGE UP
-  CIPROFLOXACIN: SIGNIFICANT CHANGE UP
-  CIPROFLOXACIN: SIGNIFICANT CHANGE UP
-  ERTAPENEM: SIGNIFICANT CHANGE UP
-  GENTAMICIN: SIGNIFICANT CHANGE UP
-  GENTAMICIN: SIGNIFICANT CHANGE UP
-  IMIPENEM: SIGNIFICANT CHANGE UP
-  LEVOFLOXACIN: SIGNIFICANT CHANGE UP
-  LEVOFLOXACIN: SIGNIFICANT CHANGE UP
-  MEROPENEM: SIGNIFICANT CHANGE UP
-  MEROPENEM: SIGNIFICANT CHANGE UP
-  PIPERACILLIN/TAZOBACTAM: SIGNIFICANT CHANGE UP
-  PIPERACILLIN/TAZOBACTAM: SIGNIFICANT CHANGE UP
-  TOBRAMYCIN: SIGNIFICANT CHANGE UP
-  TOBRAMYCIN: SIGNIFICANT CHANGE UP
-  TRIMETHOPRIM/SULFAMETHOXAZOLE: SIGNIFICANT CHANGE UP
ALBUMIN SERPL ELPH-MCNC: 1.7 G/DL — LOW (ref 3.3–5)
ALP SERPL-CCNC: 183 U/L — HIGH (ref 30–120)
ALT FLD-CCNC: 18 U/L DA — SIGNIFICANT CHANGE UP (ref 10–60)
ANION GAP SERPL CALC-SCNC: 7 MMOL/L — SIGNIFICANT CHANGE UP (ref 5–17)
AST SERPL-CCNC: 15 U/L — SIGNIFICANT CHANGE UP (ref 10–40)
BASOPHILS # BLD AUTO: 0.04 K/UL — SIGNIFICANT CHANGE UP (ref 0–0.2)
BASOPHILS NFR BLD AUTO: 0.4 % — SIGNIFICANT CHANGE UP (ref 0–2)
BILIRUB SERPL-MCNC: 0.2 MG/DL — SIGNIFICANT CHANGE UP (ref 0.2–1.2)
BUN SERPL-MCNC: 32 MG/DL — HIGH (ref 7–23)
CALCIUM SERPL-MCNC: 8.9 MG/DL — SIGNIFICANT CHANGE UP (ref 8.4–10.5)
CHLORIDE SERPL-SCNC: 105 MMOL/L — SIGNIFICANT CHANGE UP (ref 96–108)
CO2 SERPL-SCNC: 26 MMOL/L — SIGNIFICANT CHANGE UP (ref 22–31)
CREAT SERPL-MCNC: 1.15 MG/DL — SIGNIFICANT CHANGE UP (ref 0.5–1.3)
CULTURE RESULTS: SIGNIFICANT CHANGE UP
EGFR: 49 ML/MIN/1.73M2 — LOW
EOSINOPHIL # BLD AUTO: 0.17 K/UL — SIGNIFICANT CHANGE UP (ref 0–0.5)
EOSINOPHIL NFR BLD AUTO: 1.8 % — SIGNIFICANT CHANGE UP (ref 0–6)
GLUCOSE BLDC GLUCOMTR-MCNC: 159 MG/DL — HIGH (ref 70–99)
GLUCOSE BLDC GLUCOMTR-MCNC: 190 MG/DL — HIGH (ref 70–99)
GLUCOSE BLDC GLUCOMTR-MCNC: 208 MG/DL — HIGH (ref 70–99)
GLUCOSE BLDC GLUCOMTR-MCNC: 211 MG/DL — HIGH (ref 70–99)
GLUCOSE BLDC GLUCOMTR-MCNC: 215 MG/DL — HIGH (ref 70–99)
GLUCOSE SERPL-MCNC: 209 MG/DL — HIGH (ref 70–99)
HCT VFR BLD CALC: 24.3 % — LOW (ref 34.5–45)
HGB BLD-MCNC: 7.2 G/DL — LOW (ref 11.5–15.5)
IMM GRANULOCYTES NFR BLD AUTO: 0.7 % — SIGNIFICANT CHANGE UP (ref 0–1.5)
LYMPHOCYTES # BLD AUTO: 1.09 K/UL — SIGNIFICANT CHANGE UP (ref 1–3.3)
LYMPHOCYTES # BLD AUTO: 11.8 % — LOW (ref 13–44)
MCHC RBC-ENTMCNC: 24.2 PG — LOW (ref 27–34)
MCHC RBC-ENTMCNC: 29.6 GM/DL — LOW (ref 32–36)
MCV RBC AUTO: 81.5 FL — SIGNIFICANT CHANGE UP (ref 80–100)
METHOD TYPE: SIGNIFICANT CHANGE UP
METHOD TYPE: SIGNIFICANT CHANGE UP
MONOCYTES # BLD AUTO: 0.82 K/UL — SIGNIFICANT CHANGE UP (ref 0–0.9)
MONOCYTES NFR BLD AUTO: 8.9 % — SIGNIFICANT CHANGE UP (ref 2–14)
NEUTROPHILS # BLD AUTO: 7.02 K/UL — SIGNIFICANT CHANGE UP (ref 1.8–7.4)
NEUTROPHILS NFR BLD AUTO: 76.4 % — SIGNIFICANT CHANGE UP (ref 43–77)
NRBC # BLD: 0 /100 WBCS — SIGNIFICANT CHANGE UP (ref 0–0)
OB PNL STL: NEGATIVE — SIGNIFICANT CHANGE UP
ORGANISM # SPEC MICROSCOPIC CNT: SIGNIFICANT CHANGE UP
PLATELET # BLD AUTO: 287 K/UL — SIGNIFICANT CHANGE UP (ref 150–400)
POTASSIUM SERPL-MCNC: 4.5 MMOL/L — SIGNIFICANT CHANGE UP (ref 3.5–5.3)
POTASSIUM SERPL-SCNC: 4.5 MMOL/L — SIGNIFICANT CHANGE UP (ref 3.5–5.3)
PROT SERPL-MCNC: 6.8 G/DL — SIGNIFICANT CHANGE UP (ref 6–8.3)
RBC # BLD: 2.98 M/UL — LOW (ref 3.8–5.2)
RBC # FLD: 20.4 % — HIGH (ref 10.3–14.5)
SODIUM SERPL-SCNC: 138 MMOL/L — SIGNIFICANT CHANGE UP (ref 135–145)
SPECIMEN SOURCE: SIGNIFICANT CHANGE UP
WBC # BLD: 9.2 K/UL — SIGNIFICANT CHANGE UP (ref 3.8–10.5)
WBC # FLD AUTO: 9.2 K/UL — SIGNIFICANT CHANGE UP (ref 3.8–10.5)

## 2022-04-27 PROCEDURE — 99233 SBSQ HOSP IP/OBS HIGH 50: CPT

## 2022-04-27 RX ADMIN — Medication 1 APPLICATION(S): at 12:11

## 2022-04-27 RX ADMIN — Medication 4: at 16:30

## 2022-04-27 RX ADMIN — Medication 1 MILLIGRAM(S): at 08:00

## 2022-04-27 RX ADMIN — PANTOPRAZOLE SODIUM 40 MILLIGRAM(S): 20 TABLET, DELAYED RELEASE ORAL at 11:19

## 2022-04-27 RX ADMIN — METHOCARBAMOL 500 MILLIGRAM(S): 500 TABLET, FILM COATED ORAL at 06:05

## 2022-04-27 RX ADMIN — Medication 1 MILLIGRAM(S): at 23:36

## 2022-04-27 RX ADMIN — Medication 1 GRAM(S): at 16:35

## 2022-04-27 RX ADMIN — Medication 1 MILLIGRAM(S): at 11:19

## 2022-04-27 RX ADMIN — SIMETHICONE 80 MILLIGRAM(S): 80 TABLET, CHEWABLE ORAL at 06:06

## 2022-04-27 RX ADMIN — Medication 1 MILLIGRAM(S): at 10:04

## 2022-04-27 RX ADMIN — SIMETHICONE 80 MILLIGRAM(S): 80 TABLET, CHEWABLE ORAL at 11:19

## 2022-04-27 RX ADMIN — Medication 1 APPLICATION(S): at 06:09

## 2022-04-27 RX ADMIN — HEPARIN SODIUM 5000 UNIT(S): 5000 INJECTION INTRAVENOUS; SUBCUTANEOUS at 06:05

## 2022-04-27 RX ADMIN — METHOCARBAMOL 500 MILLIGRAM(S): 500 TABLET, FILM COATED ORAL at 16:17

## 2022-04-27 RX ADMIN — CHLORHEXIDINE GLUCONATE 1 APPLICATION(S): 213 SOLUTION TOPICAL at 12:10

## 2022-04-27 RX ADMIN — Medication 1 MILLIGRAM(S): at 16:16

## 2022-04-27 RX ADMIN — CHLORHEXIDINE GLUCONATE 15 MILLILITER(S): 213 SOLUTION TOPICAL at 06:05

## 2022-04-27 RX ADMIN — SIMETHICONE 80 MILLIGRAM(S): 80 TABLET, CHEWABLE ORAL at 16:16

## 2022-04-27 RX ADMIN — Medication 1 GRAM(S): at 11:19

## 2022-04-27 RX ADMIN — Medication 1 APPLICATION(S): at 16:35

## 2022-04-27 RX ADMIN — Medication 2: at 23:37

## 2022-04-27 RX ADMIN — PIPERACILLIN AND TAZOBACTAM 25 GRAM(S): 4; .5 INJECTION, POWDER, LYOPHILIZED, FOR SOLUTION INTRAVENOUS at 12:15

## 2022-04-27 RX ADMIN — Medication 1 TABLET(S): at 16:17

## 2022-04-27 RX ADMIN — PIPERACILLIN AND TAZOBACTAM 25 GRAM(S): 4; .5 INJECTION, POWDER, LYOPHILIZED, FOR SOLUTION INTRAVENOUS at 04:19

## 2022-04-27 RX ADMIN — Medication 1 APPLICATION(S): at 11:21

## 2022-04-27 RX ADMIN — Medication 1 TABLET(S): at 11:19

## 2022-04-27 RX ADMIN — POLYETHYLENE GLYCOL 3350 17 GRAM(S): 17 POWDER, FOR SOLUTION ORAL at 16:16

## 2022-04-27 RX ADMIN — Medication 1 GRAM(S): at 06:05

## 2022-04-27 RX ADMIN — SIMETHICONE 80 MILLIGRAM(S): 80 TABLET, CHEWABLE ORAL at 23:38

## 2022-04-27 RX ADMIN — Medication 2: at 11:20

## 2022-04-27 RX ADMIN — INSULIN GLARGINE 10 UNIT(S): 100 INJECTION, SOLUTION SUBCUTANEOUS at 08:00

## 2022-04-27 RX ADMIN — Medication 1 MILLIGRAM(S): at 04:19

## 2022-04-27 RX ADMIN — Medication 4: at 06:05

## 2022-04-27 RX ADMIN — PIPERACILLIN AND TAZOBACTAM 25 GRAM(S): 4; .5 INJECTION, POWDER, LYOPHILIZED, FOR SOLUTION INTRAVENOUS at 20:55

## 2022-04-27 RX ADMIN — Medication 1 MILLIGRAM(S): at 20:55

## 2022-04-27 RX ADMIN — Medication 1 GRAM(S): at 23:38

## 2022-04-27 RX ADMIN — Medication 1 TABLET(S): at 06:05

## 2022-04-27 RX ADMIN — CHLORHEXIDINE GLUCONATE 15 MILLILITER(S): 213 SOLUTION TOPICAL at 16:16

## 2022-04-27 RX ADMIN — HEPARIN SODIUM 5000 UNIT(S): 5000 INJECTION INTRAVENOUS; SUBCUTANEOUS at 16:17

## 2022-04-27 NOTE — PROGRESS NOTE ADULT - SUBJECTIVE AND OBJECTIVE BOX
Chief Complaint: Respiratory failure    Interval Events: No events overnight.    Review of Systems:  Unable to obtain    Physical Exam:  Vital Signs Last 24 Hrs  T(C): 36.4 (27 Apr 2022 08:20), Max: 37.4 (26 Apr 2022 08:59)  T(F): 97.6 (27 Apr 2022 08:20), Max: 99.4 (26 Apr 2022 08:59)  HR: 91 (27 Apr 2022 08:00) (78 - 106)  BP: 134/72 (27 Apr 2022 08:00) (102/66 - 137/73)  BP(mean): 91 (27 Apr 2022 08:00) (71 - 102)  RR: 21 (27 Apr 2022 08:00) (16 - 32)  SpO2: 97% (27 Apr 2022 08:00) (95% - 100%)  General: NAD  HEENT: Trach  Neck: No JVD, no carotid bruit  Lungs: Coarse bilaterally  CV: RRR, nl S1/S2, no M/R/G  Abdomen: S/NT/ND, +BS  Extremities: No LE edema, no cyanosis  Neuro: AAOx0  Skin: No rash    Labs:             04-27    138  |  105  |  32<H>  ----------------------------<  209<H>  4.5   |  26  |  1.15    Ca    8.9      27 Apr 2022 06:31    TPro  6.8  /  Alb  1.7<L>  /  TBili  0.2  /  DBili  x   /  AST  15  /  ALT  18  /  AlkPhos  183<H>  04-27                        7.5    10.48 )-----------( 269      ( 26 Apr 2022 06:39 )             25.3       Telemetry: Sinus rhythm

## 2022-04-27 NOTE — PROGRESS NOTE ADULT - ASSESSMENT
peg balloon broekn  resp failure  dysphagia    plan  peg plan  monitor in and out  ppi once a day  check residuals and electrolytes  gerd precautions    Advanced care planning was discussed with patient and family.  Advanced care planning forms were reviewed and discussed.  Risks, benefits and alternatives of gastroenterologic procedures were discussed in detail and all questions were answered.    30 minutes spent.

## 2022-04-27 NOTE — PROGRESS NOTE ADULT - ASSESSMENT
76F Chronic Respiratory Failure, HTN, DM2, Dementia, admitted for Septic Shock.     Acute on Chronic Hypoxic Respiratory Failure /  Septic Shock   Imaging shows inflitrates and pleural effusions; UC showing ESBL UTI; C. Diff studies are indeterminate  IV Zosyn and ID on board   Off Pressors and hemodynamics improved; On Vent via Trach   Arterial Line removed  Discussed with ID, completes course of antibiotics today    Acute Blood Loss Anemia   S/P 1U PRBC Transfusion and started on Folic Acid   Has Anemia of Chronic Disease   Hematology consult noted     Acute Renal Failure  Improved and from sepsis   Monitor BMP and Electrolytes     Diabetes type II  Insulin Corrective Scale  Finger sticks per routine   Hypoglycemia protocol  Started on Lantus Q am     Quadriplegia  c/w nursing care for all ADLs    COPD  c/w inhalers   pulm (Jaime), recs appreciated    GERD  c/w carafate and PPI     Disposition  Discharge planning pending hospital course

## 2022-04-27 NOTE — PROGRESS NOTE ADULT - SUBJECTIVE AND OBJECTIVE BOX
BRE[facility] BECKH[facility]    Allergies    codeine (Hives)    Intolerances        PAST MEDICAL & SURGICAL HISTORY:  Dementia of frontal lobe type    Aphasic stroke    Diabetes mellitus    Respiratory failure    Hypertension    GERD (gastroesophageal reflux disease)    Constipation    Respiratory failure    CVA (cerebral vascular accident)    HTN (hypertension)    DM (diabetes mellitus)    Advanced dementia    COVID-19 virus detected    Quadriplegia    Pneumonia    Type II diabetes mellitus    Hx of appendectomy    Gastrostomy in place    Tracheostomy in place    Tracheostomy tube present    Feeding by G-tube        FAMILY HISTORY:  No pertinent family history in first degree relatives        Home Medications:  albuterol 90 mcg/inh inhalation aerosol with adapter: 2  inhaled every 6 hours (21 Apr 2022 14:01)  Bacid (LAC) oral tablet: 2 tab(s) by gastrostomy tube once a day (21 Apr 2022 13:45)  Basaglar KwikPen 100 units/mL subcutaneous solution: 36 unit(s) subcutaneous once a day (at bedtime) (21 Apr 2022 14:01)  Betadine 10% topical swab: cleanse right and left great toes (21 Apr 2022 14:01)  Carafate 1 g/10 mL oral suspension: 10 milliliter(s) by gastrostomy tube 4 times a day (before meals and at bedtime) for 14 days (Started 6/4/21) (21 Apr 2022 13:45)  chlorhexidine 0.12% mucous membrane liquid: 15 milliliter(s) mucous membrane 2 times a day (21 Apr 2022 13:45)  Eucerin topical cream: Apply topically to affected area once a day bilateral feet (21 Apr 2022 14:01)  ipratropium-albuterol 0.5 mg-2.5 mg/3 mL inhalation solution: 3 milliliter(s) inhaled 4 times a day (21 Apr 2022 13:45)  LORazepam 1 mg oral tablet: 1 tab(s) by gastrostomy tube every 4 hours (21 Apr 2022 14:01)  methocarbamol 500 mg oral tablet: 1 tab(s) by gastrostomy tube 2 times a day (21 Apr 2022 14:01)  pantoprazole: 20 milliliter(s) by gastrostomy tube 2 times a day (21 Apr 2022 14:01)  polyethylene glycol 3350 oral powder for reconstitution: 17 gram(s) by gastrostomy tube every 12 hours (21 Apr 2022 14:01)  senna 8.6 mg oral tablet: 3 tab(s) by gastrostomy tube once a day (at bedtime) (21 Apr 2022 13:45)  silver sulfADIAZINE 1% topical cream: Apply topically to affected area once a day to sacrum (21 Apr 2022 14:01)  simethicone 80 mg oral tablet, chewable: 1 tab(s) by gastrostomy tube every 6 hours (21 Apr 2022 14:01)  Tylenol 325 mg oral tablet: 2 tab(s) by gastrostomy tube once a day; 60 minutes prior to dressing change  (21 Apr 2022 13:45)  Tylenol 325 mg oral tablet: 2 tab(s) by gastrostomy tube every 6 hours, As Needed (21 Apr 2022 14:01)      MEDICATIONS  (STANDING):  chlorhexidine 0.12% Liquid 15 milliLiter(s) Oral Mucosa every 12 hours  chlorhexidine 2% Cloths 1 Application(s) Topical daily  collagenase Ointment 1 Application(s) Topical daily  dextrose 5%. 1000 milliLiter(s) (50 mL/Hr) IV Continuous <Continuous>  dextrose 5%. 1000 milliLiter(s) (100 mL/Hr) IV Continuous <Continuous>  dextrose 50% Injectable 25 Gram(s) IV Push once  dextrose 50% Injectable 12.5 Gram(s) IV Push once  dextrose 50% Injectable 25 Gram(s) IV Push once  folic acid 1 milliGRAM(s) Oral daily  glucagon  Injectable 1 milliGRAM(s) IntraMuscular once  heparin   Injectable 5000 Unit(s) SubCutaneous every 12 hours  insulin glargine Injectable (LANTUS) 10 Unit(s) SubCutaneous every morning  insulin lispro (ADMELOG) corrective regimen sliding scale   SubCutaneous every 6 hours  lactobacillus acidophilus 1 Tablet(s) Oral two times a day  LORazepam     Tablet 1 milliGRAM(s) Oral every 4 hours  methocarbamol 500 milliGRAM(s) Oral two times a day  mineral oil/petrolatum Hydrophilic Ointment 1 Application(s) Topical daily  pantoprazole  Injectable 40 milliGRAM(s) IV Push daily  piperacillin/tazobactam IVPB.. 3.375 Gram(s) IV Intermittent every 8 hours  polyethylene glycol 3350 17 Gram(s) Oral two times a day  povidone iodine 10% Ointment 1 Application(s) Topical every 12 hours  senna 2 Tablet(s) Oral at bedtime  simethicone 80 milliGRAM(s) Chew every 6 hours  sucralfate 1 Gram(s) Oral four times a day    MEDICATIONS  (PRN):  acetaminophen     Tablet .. 650 milliGRAM(s) Oral every 6 hours PRN Temp greater or equal to 38C (100.4F)  ALBUTerol    90 MICROgram(s) HFA Inhaler 2 Puff(s) Inhalation every 6 hours PRN Shortness of Breath and/or Wheezing  dextrose Oral Gel 15 Gram(s) Oral once PRN Blood Glucose LESS THAN 70 milliGRAM(s)/deciliter  LORazepam   Injectable 1 milliGRAM(s) IV Push every 6 hours PRN Anxiety  sodium chloride 0.9% lock flush 10 milliLiter(s) IV Push every 1 hour PRN Pre/post blood products, medications, blood draw, and to maintain line patency      Diet, NPO with Tube Feed:   Tube Feeding Modality: Gastrostomy  Glucerna 1.5 Horacio  Total Volume for 24 Hours (mL): 1200  Continuous  Starting Tube Feed Rate mL per Hour: 20  Increase Tube Feed Rate by (mL): 10     Every 6 hours  Until Goal Tube Feed Rate (mL per Hour): 60  Tube Feed Duration (in Hours): 20  Tube Feed Start Time: 00:00  Free Water Flush  Pump   Rate (mL per Hour): 30   Frequency: Every Hour    Duration (Hours): 24 (04-22-22 @ 12:49) [Active]          Vital Signs Last 24 Hrs  T(C): 36.9 (27 Apr 2022 03:49), Max: 37.4 (26 Apr 2022 08:59)  T(F): 98.4 (27 Apr 2022 03:49), Max: 99.4 (26 Apr 2022 08:59)  HR: 84 (27 Apr 2022 06:33) (78 - 106)  BP: 130/74 (27 Apr 2022 05:00) (102/66 - 134/66)  BP(mean): 92 (27 Apr 2022 05:00) (71 - 92)  RR: 32 (27 Apr 2022 05:00) (17 - 32)  SpO2: 99% (27 Apr 2022 06:33) (95% - 100%)      04-25-22 @ 07:01  -  04-26-22 @ 07:00  --------------------------------------------------------  IN: 2140 mL / OUT: 1500 mL / NET: 640 mL    04-26-22 @ 07:01  -  04-27-22 @ 06:56  --------------------------------------------------------  IN: 1105 mL / OUT: 1100 mL / NET: 5 mL        Mode: AC/ CMV (Assist Control/ Continuous Mandatory Ventilation), RR (machine): 18, TV (machine): 400, FiO2: 40, PEEP: 5, ITime: 1, MAP: 14, PIP: 34      LABS:                        7.5    10.48 )-----------( 269      ( 26 Apr 2022 06:39 )             25.3     04-26    140  |  104  |  40<H>  ----------------------------<  244<H>  4.5   |  27  |  1.15    Ca    8.9      26 Apr 2022 06:39    TPro  6.9  /  Alb  1.7<L>  /  TBili  0.4  /  DBili  x   /  AST  14  /  ALT  19  /  AlkPhos  205<H>  04-26              WBC:  WBC Count: 10.48 K/uL (04-26 @ 06:39)  WBC Count: 6.81 K/uL (04-25 @ 05:58)  WBC Count: 9.33 K/uL (04-24 @ 05:50)      MICROBIOLOGY:  RECENT CULTURES:  04-24 .Sputum Sputum XXXX   Few Squamous epithelial cells per low power field  Moderate polymorphonuclear leukocytes per low power field  Moderate Gram Negative Rods per oil power field   Moderate Pseudomonas aeruginosa Susceptibility to follow.  Moderate Serratia marcescens Susceptibility to follow.  Normal Respiratory Elvira present    04-22 Skin Skin XXXX XXXX   Culture yields growth of greater than 3 colony types of  Call client services within 7 days if further workup is clinically  indicated.    04-21 Clean Catch Clean Catch (Midstream) Escherichia coli ESBL XXXX   >100,000 CFU/ml Escherichia coli ESBL    04-21 .Blood Blood-Peripheral XXXX XXXX   No Growth Final                    Sodium:  Sodium, Serum: 140 mmol/L (04-26 @ 06:39)  Sodium, Serum: 139 mmol/L (04-25 @ 05:58)  Sodium, Serum: 133 mmol/L (04-24 @ 05:50)      1.15 mg/dL 04-26 @ 06:39  1.12 mg/dL 04-25 @ 05:58  1.15 mg/dL 04-24 @ 05:50      Hemoglobin:  Hemoglobin: 7.5 g/dL (04-26 @ 06:39)  Hemoglobin: 7.1 g/dL (04-25 @ 05:58)  Hemoglobin: 7.5 g/dL (04-24 @ 05:50)      Platelets: Platelet Count - Automated: 269 K/uL (04-26 @ 06:39)  Platelet Count - Automated: 220 K/uL (04-25 @ 05:58)  Platelet Count - Automated: 218 K/uL (04-24 @ 05:50)      LIVER FUNCTIONS - ( 26 Apr 2022 06:39 )  Alb: 1.7 g/dL / Pro: 6.9 g/dL / ALK PHOS: 205 U/L / ALT: 19 U/L DA / AST: 14 U/L / GGT: x                 RADIOLOGY & ADDITIONAL STUDIES:      MICROBIOLOGY:  RECENT CULTURES:  04-24 .Sputum Sputum XXXX   Few Squamous epithelial cells per low power field  Moderate polymorphonuclear leukocytes per low power field  Moderate Gram Negative Rods per oil power field   Moderate Pseudomonas aeruginosa Susceptibility to follow.  Moderate Serratia marcescens Susceptibility to follow.  Normal Respiratory Elvira present    04-22 Skin Skin XXXX XXXX   Culture yields growth of greater than 3 colony types of  Call client services within 7 days if further workup is clinically  indicated.    04-21 Clean Catch Clean Catch (Midstream) Escherichia coli ESBL XXXX   >100,000 CFU/ml Escherichia coli ESBL    04-21 .Blood Blood-Peripheral XXXX XXXX   No Growth Final

## 2022-04-27 NOTE — PROGRESS NOTE ADULT - ASSESSMENT
CHAPINCITO GUIDRY 75 f [facility] S 4/21/2022   DR ILIANA KEMP     REVIEW OF SYMPTOMS      Able to give (reliable) ROS  NO     PHYSICAL EXAM    HEENT Unremarkable  atraumatic   RESP Fair air entry EXP prolonged    Harsh breath sound Resp distres mild   CARDIAC S1 S2 No S3     NO JVD    ABDOMEN SOFT BS PRESENT NOT DISTENDED No hepatosplenomegaly   PEDAL EDEMA present No calf tenderness  NO rash       DOA/CC/PROBLEMS poa .  76 f   from [facility]  doa 4/21/2022  cc fever hypotension    PRESENTING PROBLEMS 4/21 4/23/2022   Sepsis   Lacticemia La 2.8   Anemia Hb 6   RYAN Cr 1.3     PMH-PSH .  Pneumonia  HTN, DM, CVA, dementia, chronic respiratory failure s/p trach, PEG, cardiac arrest      COVID/ICU/CODE STATUS.                       COVID  STATUS.           ICU STAY. none  GOC.      BEST PRACTICE ISSUES.                                                  HEAD OF BED ELEVATION. Yes  DVT PROPHYLAXIS.    4/21 hpsc   INFECTION PROPHYLAXIS.   4/22 chlorhexidine 2%   4/21/2022 chlorhexidine .12%    SQUIRES PROPHYLAXIS.  4/21 protonix    4/21 carafate                                                                                      DIET.  4/22 glucerna 1.5 1200 gt               VITALS/PO/IO/VENT/DRIPS.  4/27/2022 88 130/70   4/27/2022 ac 18/400/5/.6      PROBLEM DATA/ASSESSMENT RECOMMENDATIONS (A/R).    HEMODYNAMICS.   Monitor bp Target MAP 65 (+)    RESP.   Monitor po Target po 90-95%      PMH/PSH PROBLEMS.  Management continued/modified as indicated    VENT MANAGEMENT.   HOB elevation  Target Pplat 30 (-)  Target PO 90-95%  Target pH 730 (+)  Daily spontaneous breathing trials   Daily sedation vacation       INFECTION SOURCE.   VAP   INFECTION A/R.   4/22 C diff indet   4/21 urine c 100k e coli ESBL  ID Dr BRITTANY Brantley/Dr [place] on case aware  Is on zosyn      SHOCK.  4/21 norepi  4/22/2022 wened off nore     LACTICEMIA.  La 4/21-4/22/2022 la 2.8 - 1.2     COPD.  4/21 albuterol  4/21 spiriva   under control    ANEMIA.   Hb 4/21-4/22-4/23-4/24-4/25-4/26-4/27/2022 hb 6 - 8.4 -7.7 - 7.5 - 7.1 - 7.5 - 7.2   Monitro serial    TRANSFUSION  4/21 1 u prbc       HYPONATREMIA.  Na 4/21-4/22-4/23-4/24-4/26/2022 na 134- 133-131-133 - 140   4/23/2022 nacl added     RYAN.  Cr 4/21-4/22-4/24/2022 Cr 1.3 - 1.1-1.1      AMS.  ct 4/21 ct head (-)    ANXIETY.  4/23/2022 lorazepam 1.4p  4/21 lorazepam 1.6     SPASMS   4/21 methocarbamol 500.2     TIME SPENT   Over 36 minutes aggregate critical care time spent on encounter; activities included   direct patient care, counseling and/or coordinating care reviewing notes, lab data/ imaging , discussion with multidisciplinary team/ patient  /family and explaining in detail risks, benefits, alternatives  of the recommendations     CHAPINCITO GUIDRY 75 f [place] S 4/21/2022   DR ILIANA KEMP

## 2022-04-27 NOTE — PROGRESS NOTE ADULT - SUBJECTIVE AND OBJECTIVE BOX
Patient is a 76y old  Female who presents with a chief complaint of Anemia (27 Apr 2022 08:32)      INTERVAL HPI/OVERNIGHT EVENTS: Patient seen and examined at bedside. No overnight events    MEDICATIONS  (STANDING):  chlorhexidine 0.12% Liquid 15 milliLiter(s) Oral Mucosa every 12 hours  chlorhexidine 2% Cloths 1 Application(s) Topical daily  collagenase Ointment 1 Application(s) Topical daily  dextrose 5%. 1000 milliLiter(s) (50 mL/Hr) IV Continuous <Continuous>  dextrose 5%. 1000 milliLiter(s) (100 mL/Hr) IV Continuous <Continuous>  dextrose 50% Injectable 25 Gram(s) IV Push once  dextrose 50% Injectable 12.5 Gram(s) IV Push once  dextrose 50% Injectable 25 Gram(s) IV Push once  folic acid 1 milliGRAM(s) Oral daily  glucagon  Injectable 1 milliGRAM(s) IntraMuscular once  heparin   Injectable 5000 Unit(s) SubCutaneous every 12 hours  insulin glargine Injectable (LANTUS) 10 Unit(s) SubCutaneous every morning  insulin lispro (ADMELOG) corrective regimen sliding scale   SubCutaneous every 6 hours  lactobacillus acidophilus 1 Tablet(s) Oral two times a day  LORazepam     Tablet 1 milliGRAM(s) Oral every 4 hours  methocarbamol 500 milliGRAM(s) Oral two times a day  mineral oil/petrolatum Hydrophilic Ointment 1 Application(s) Topical daily  pantoprazole  Injectable 40 milliGRAM(s) IV Push daily  piperacillin/tazobactam IVPB.. 3.375 Gram(s) IV Intermittent every 8 hours  polyethylene glycol 3350 17 Gram(s) Oral two times a day  povidone iodine 10% Ointment 1 Application(s) Topical every 12 hours  senna 2 Tablet(s) Oral at bedtime  simethicone 80 milliGRAM(s) Chew every 6 hours  sucralfate 1 Gram(s) Oral four times a day    MEDICATIONS  (PRN):  acetaminophen     Tablet .. 650 milliGRAM(s) Oral every 6 hours PRN Temp greater or equal to 38C (100.4F)  ALBUTerol    90 MICROgram(s) HFA Inhaler 2 Puff(s) Inhalation every 6 hours PRN Shortness of Breath and/or Wheezing  dextrose Oral Gel 15 Gram(s) Oral once PRN Blood Glucose LESS THAN 70 milliGRAM(s)/deciliter  LORazepam   Injectable 1 milliGRAM(s) IV Push every 6 hours PRN Anxiety  sodium chloride 0.9% lock flush 10 milliLiter(s) IV Push every 1 hour PRN Pre/post blood products, medications, blood draw, and to maintain line patency      Allergies    codeine (Hives)    Intolerances        REVIEW OF SYSTEMS:  Unable to obtain meaningful ROS due to mental status      Vital Signs Last 24 Hrs  T(C): 36.4 (27 Apr 2022 08:20), Max: 37.4 (26 Apr 2022 08:59)  T(F): 97.6 (27 Apr 2022 08:20), Max: 99.4 (26 Apr 2022 08:59)  HR: 91 (27 Apr 2022 08:00) (78 - 106)  BP: 134/72 (27 Apr 2022 08:00) (102/66 - 137/73)  BP(mean): 91 (27 Apr 2022 08:00) (71 - 102)  RR: 21 (27 Apr 2022 08:00) (16 - 32)  SpO2: 97% (27 Apr 2022 08:00) (95% - 100%)    PHYSICAL EXAM:  GENERAL: chronically ill appearing, emaciated, NAD, obtunded  HEAD:  atraumatic  EYES: conjunctiva clear  NECK: (+)trach in place, clean  RESPIRATORY: mechanical breath sounds, vent in place, no gross crackles or rales  CARDIOVASCULAR:  regular rate and rhythm, no murmurs or rubs or gallops, 2+ peripheral pulses  GASTROINTESTINAL:  soft, +PEG in place, clean and dry as well, nondistended, bowel sounds present  EXTREMITIES:     no clubbing or cyanosis or edema  MUSCULOSKELETAL:  (+)diffuse contractures in all joints  NERVOUS SYSTEM: does not respond to pain    LABS:    CBC Full  -  ( 26 Apr 2022 06:39 )  WBC Count : 10.48 K/uL  Hemoglobin : 7.5 g/dL  Hematocrit : 25.3 %  Platelet Count - Automated : 269 K/uL  Mean Cell Volume : 80.3 fl  Mean Cell Hemoglobin : 23.8 pg  Mean Cell Hemoglobin Concentration : 29.6 gm/dL  Auto Neutrophil # : 7.90 K/uL  Auto Lymphocyte # : 1.21 K/uL  Auto Monocyte # : 1.05 K/uL  Auto Eosinophil # : 0.12 K/uL  Auto Basophil # : 0.04 K/uL  Auto Neutrophil % : 75.5 %  Auto Lymphocyte % : 11.5 %  Auto Monocyte % : 10.0 %  Auto Eosinophil % : 1.1 %  Auto Basophil % : 0.4 %    27 Apr 2022 06:31    138    |  105    |  32     ----------------------------<  209    4.5     |  26     |  1.15     Ca    8.9        27 Apr 2022 06:31    TPro  6.8    /  Alb  1.7    /  TBili  0.2    /  DBili  x      /  AST  15     /  ALT  18     /  AlkPhos  183    27 Apr 2022 06:31        CAPILLARY BLOOD GLUCOSE      POCT Blood Glucose.: 211 mg/dL (27 Apr 2022 07:58)  POCT Blood Glucose.: 215 mg/dL (27 Apr 2022 06:00)  POCT Blood Glucose.: 201 mg/dL (26 Apr 2022 23:42)  POCT Blood Glucose.: 205 mg/dL (26 Apr 2022 17:11)  POCT Blood Glucose.: 199 mg/dL (26 Apr 2022 11:28)        Culture - Sputum (collected 04-24-22 @ 12:35)  Source: .Sputum Sputum  Gram Stain (04-24-22 @ 15:10):    Few Squamous epithelial cells per low power field    Moderate polymorphonuclear leukocytes per low power field    Moderate Gram Negative Rods per oil power field  Final Report (04-27-22 @ 07:34):    Moderate Pseudomonas aeruginosa (Carbapenem Resistant)    Moderate Serratia marcescens    Normal Respiratory Elvira present  Organism: Pseudomonas aeruginosa (Carbapenem Resistant)  Serratia marcescens (04-27-22 @ 07:34)  Organism: Serratia marcescens (04-27-22 @ 07:34)      -  Amikacin: S <=16      -  Amoxicillin/Clavulanic Acid: R >16/8      -  Ampicillin: R >16 These ampicillin results predict results for amoxicillin      -  Ampicillin/Sulbactam: R >16/8 Enterobacter, Klebsiella aerogenes, Citrobacter, and Serratia may develop resistance during prolonged therapy (3-4 days)      -  Aztreonam: R >16      -  Cefazolin: R >16 Enterobacter, Klebsiella aerogenes, Citrobacter, and Serratia may develop resistance during prolonged therapy (3-4 days)      -  Cefepime: S 8      -  Cefoxitin: R >16      -  Ceftriaxone: R >32 Enterobacter, Klebsiella aerogenes, Citrobacter, and Serratia may develop resistance during prolonged therapy      -  Ciprofloxacin: R 2      -  Ertapenem: S <=0.5      -  Gentamicin: S <=2      -  Levofloxacin: R 2      -  Meropenem: S <=1      -  Piperacillin/Tazobactam: S 16      -  Tobramycin: S 4      -  Trimethoprim/Sulfamethoxazole: S <=0.5/9.5      Method Type: PRATIK  Organism: Pseudomonas aeruginosa (Carbapenem Resistant) (04-27-22 @ 07:34)      -  Amikacin: S <=16      -  Aztreonam: S <=4      -  Cefepime: S 8      -  Ceftazidime: S 8      -  Ciprofloxacin: S <=0.25      -  Gentamicin: S <=2      -  Imipenem: R >8      -  Levofloxacin: S <=0.5      -  Meropenem: R 8      -  Piperacillin/Tazobactam: S 16      -  Tobramycin: S <=2      Method Type: PRATIK    Culture - Other (collected 04-22-22 @ 20:58)  Source: Skin Skin  Final Report (04-25-22 @ 21:04):    Culture yields growth of greater than 3 colony types of    Call client services within 7 days if further workup is clinically    indicated.    Culture - Urine (collected 04-21-22 @ 18:36)  Source: Clean Catch Clean Catch (Midstream)  Final Report (04-24-22 @ 08:46):    >100,000 CFU/ml Escherichia coli ESBL  Organism: Escherichia coli ESBL (04-24-22 @ 08:46)  Organism: Escherichia coli ESBL (04-24-22 @ 08:46)      -  Amikacin: S <=16      -  Amoxicillin/Clavulanic Acid: I 16/8      -  Ampicillin: R >16 These ampicillin results predict results for amoxicillin      -  Ampicillin/Sulbactam: R >16/8 Enterobacter, Klebsiella aerogenes, Citrobacter, and Serratia may develop resistance during prolonged therapy (3-4 days)      -  Aztreonam: R >16      -  Cefazolin: R >16 (MIC_CL_COM_ENTERIC_CEFAZU) For uncomplicated UTI with K. pneumoniae, E. coli, or P. mirablis: PRATIK <=16 is sensitive and PRTAIK >=32 is resistant. This also predicts results for oral agents cefaclor, cefdinir, cefpodoxime, cefprozil, cefuroxime axetil, cephalexin and locarbef for uncomplicated UTI. Note that some isolates may be susceptible to these agents while testing resistant to cefazolin.      -  Cefepime: R >16      -  Ceftriaxone: R >32 Enterobacter, Klebsiella aerogenes, Citrobacter, and Serratia may develop resistance during prolonged therapy      -  Ciprofloxacin: R >2      -  Ertapenem: S <=0.5      -  Gentamicin: R >8      -  Imipenem: S <=1      -  Levofloxacin: R 4      -  Meropenem: S <=1      -  Nitrofurantoin: S <=32 Should not be used to treat pyelonephritis      -  Piperacillin/Tazobactam: S <=8      -  Tigecycline: S <=2      -  Tobramycin: R >8      -  Trimethoprim/Sulfamethoxazole: R >2/38      Method Type: PRATIK    Culture - Blood (collected 04-21-22 @ 18:24)  Source: .Blood Blood-Peripheral  Final Report (04-26-22 @ 19:00):    No Growth Final    Culture - Blood (collected 04-21-22 @ 18:24)  Source: .Blood Blood-Peripheral  Final Report (04-26-22 @ 19:00):    No Growth Final        RADIOLOGY & ADDITIONAL TESTS:     Consultant(s) Notes Reviewed:  [x] YES  [ ] NO    Care Discussed with [x] Consultants  [x] Patient  [ ] Family  [ ]      [ x] Other; RN  DVT ppx

## 2022-04-27 NOTE — PROGRESS NOTE ADULT - SUBJECTIVE AND OBJECTIVE BOX
Date/Time Patient Seen:  		  Referring MD:   Data Reviewed	       Patient is a 76y old  Female who presents with a chief complaint of Anemia (26 Apr 2022 21:00)      Subjective/HPI     PAST MEDICAL & SURGICAL HISTORY:  Dementia of frontal lobe type    Aphasic stroke    Diabetes mellitus    Respiratory failure    Hypertension    GERD (gastroesophageal reflux disease)    Constipation    Respiratory failure    CVA (cerebral vascular accident)    HTN (hypertension)    DM (diabetes mellitus)    Advanced dementia    COVID-19 virus detected    Quadriplegia    Pneumonia    Type II diabetes mellitus    Hx of appendectomy    Gastrostomy in place    Tracheostomy in place    Tracheostomy tube present    Feeding by G-tube          Medication list         MEDICATIONS  (STANDING):  chlorhexidine 0.12% Liquid 15 milliLiter(s) Oral Mucosa every 12 hours  chlorhexidine 2% Cloths 1 Application(s) Topical daily  collagenase Ointment 1 Application(s) Topical daily  dextrose 5%. 1000 milliLiter(s) (50 mL/Hr) IV Continuous <Continuous>  dextrose 5%. 1000 milliLiter(s) (100 mL/Hr) IV Continuous <Continuous>  dextrose 50% Injectable 25 Gram(s) IV Push once  dextrose 50% Injectable 12.5 Gram(s) IV Push once  dextrose 50% Injectable 25 Gram(s) IV Push once  folic acid 1 milliGRAM(s) Oral daily  glucagon  Injectable 1 milliGRAM(s) IntraMuscular once  heparin   Injectable 5000 Unit(s) SubCutaneous every 12 hours  insulin glargine Injectable (LANTUS) 10 Unit(s) SubCutaneous every morning  insulin lispro (ADMELOG) corrective regimen sliding scale   SubCutaneous every 6 hours  lactobacillus acidophilus 1 Tablet(s) Oral two times a day  LORazepam     Tablet 1 milliGRAM(s) Oral every 4 hours  methocarbamol 500 milliGRAM(s) Oral two times a day  mineral oil/petrolatum Hydrophilic Ointment 1 Application(s) Topical daily  pantoprazole  Injectable 40 milliGRAM(s) IV Push daily  piperacillin/tazobactam IVPB.. 3.375 Gram(s) IV Intermittent every 8 hours  polyethylene glycol 3350 17 Gram(s) Oral two times a day  povidone iodine 10% Ointment 1 Application(s) Topical every 12 hours  senna 2 Tablet(s) Oral at bedtime  simethicone 80 milliGRAM(s) Chew every 6 hours  sucralfate 1 Gram(s) Oral four times a day    MEDICATIONS  (PRN):  acetaminophen     Tablet .. 650 milliGRAM(s) Oral every 6 hours PRN Temp greater or equal to 38C (100.4F)  ALBUTerol    90 MICROgram(s) HFA Inhaler 2 Puff(s) Inhalation every 6 hours PRN Shortness of Breath and/or Wheezing  dextrose Oral Gel 15 Gram(s) Oral once PRN Blood Glucose LESS THAN 70 milliGRAM(s)/deciliter  LORazepam   Injectable 1 milliGRAM(s) IV Push every 6 hours PRN Anxiety  sodium chloride 0.9% lock flush 10 milliLiter(s) IV Push every 1 hour PRN Pre/post blood products, medications, blood draw, and to maintain line patency         Vitals log        ICU Vital Signs Last 24 Hrs  T(C): 36.9 (27 Apr 2022 03:49), Max: 37.4 (26 Apr 2022 08:59)  T(F): 98.4 (27 Apr 2022 03:49), Max: 99.4 (26 Apr 2022 08:59)  HR: 87 (27 Apr 2022 05:15) (78 - 106)  BP: 130/74 (27 Apr 2022 05:00) (102/66 - 134/66)  BP(mean): 92 (27 Apr 2022 05:00) (71 - 92)  ABP: --  ABP(mean): --  RR: 32 (27 Apr 2022 05:00) (17 - 32)  SpO2: 99% (27 Apr 2022 05:15) (95% - 100%)       Mode: AC/ CMV (Assist Control/ Continuous Mandatory Ventilation)  RR (machine): 18  TV (machine): 400  FiO2: 40  PEEP: 5  ITime: 1  MAP: 12  PIP: 29      Input and Output:  I&O's Detail    25 Apr 2022 07:01  -  26 Apr 2022 07:00  --------------------------------------------------------  IN:    Enteral Tube Flush: 600 mL    Glucerna 1.5: 1440 mL    IV PiggyBack: 100 mL  Total IN: 2140 mL    OUT:    Indwelling Catheter - Urethral (mL): 1500 mL  Total OUT: 1500 mL    Total NET: 640 mL      26 Apr 2022 07:01  -  27 Apr 2022 06:23  --------------------------------------------------------  IN:    Enteral Tube Flush: 385 mL    Glucerna 1.5: 720 mL  Total IN: 1105 mL    OUT:    Indwelling Catheter - Urethral (mL): 1100 mL  Total OUT: 1100 mL    Total NET: 5 mL          Lab Data                        7.5    10.48 )-----------( 269      ( 26 Apr 2022 06:39 )             25.3     04-26    140  |  104  |  40<H>  ----------------------------<  244<H>  4.5   |  27  |  1.15    Ca    8.9      26 Apr 2022 06:39    TPro  6.9  /  Alb  1.7<L>  /  TBili  0.4  /  DBili  x   /  AST  14  /  ALT  19  /  AlkPhos  205<H>  04-26            Review of Systems	      Objective     Physical Examination  heart s1s2  lung dec BS  abd soft      Pertinent Lab findings & Imaging      Annalise:  NO   Adequate UO     I&O's Detail    25 Apr 2022 07:01  -  26 Apr 2022 07:00  --------------------------------------------------------  IN:    Enteral Tube Flush: 600 mL    Glucerna 1.5: 1440 mL    IV PiggyBack: 100 mL  Total IN: 2140 mL    OUT:    Indwelling Catheter - Urethral (mL): 1500 mL  Total OUT: 1500 mL    Total NET: 640 mL      26 Apr 2022 07:01  -  27 Apr 2022 06:23  --------------------------------------------------------  IN:    Enteral Tube Flush: 385 mL    Glucerna 1.5: 720 mL  Total IN: 1105 mL    OUT:    Indwelling Catheter - Urethral (mL): 1100 mL  Total OUT: 1100 mL    Total NET: 5 mL               Discussed with:     Cultures:	        Radiology

## 2022-04-27 NOTE — PROGRESS NOTE ADULT - SUBJECTIVE AND OBJECTIVE BOX
[facility], Division of Infectious Diseases  MOIZ Lora Y. Patel, S. Shah, G. Casimir  [phone]  ([phone] - weekdays after 5pm and weekends)    Name: BREA BECKHAM  Age/Gender: 76y Female  MRN: [id number]    Interval History:  Patient seen this morning.   Notes reviewed.   No concerning overnight events.  Afebrile.     Allergies: codeine (Hives)      Objective:  Vitals:   T(F): 97.6 (04-27-22 @ 08:20), Max: 98.7 (04-26-22 @ 17:00)  HR: 91 (04-27-22 @ 08:00) (78 - 106)  BP: 134/72 (04-27-22 @ 08:00) (102/66 - 137/73)  RR: 21 (04-27-22 @ 08:00) (16 - 32)  SpO2: 97% (04-27-22 @ 08:00) (95% - 100%)  Physical Examination:  General: no acute distress, somnolent  HEENT: anicteric  Cardio: S1, S2 present, normal rate  Resp: tracheostomy, on vent  Abd: soft, distended, PEG tube  Ext: b/l LE edema  Skin: warm, dry  Lines:    Laboratory Studies:  CBC:                       7.5    10.48 )-----------( 269      ( 26 Apr 2022 06:39 )             25.3     WBC Trend:  10.48 04-26-22 @ 06:39  6.81 04-25-22 @ 05:58  9.33 04-24-22 @ 05:50  11.35 04-23-22 @ 06:07  15.47 04-22-22 @ 06:58  14.93 04-21-22 @ 22:35  14.15 04-21-22 @ 13:04    CMP: 04-27    138  |  105  |  32<H>  ----------------------------<  209<H>  4.5   |  26  |  1.15    Ca    8.9      27 Apr 2022 06:31    TPro  6.8  /  Alb  1.7<L>  /  TBili  0.2  /  DBili  x   /  AST  15  /  ALT  18  /  AlkPhos  183<H>  04-27      LIVER FUNCTIONS - ( 27 Apr 2022 06:31 )  Alb: 1.7 g/dL / Pro: 6.8 g/dL / ALK PHOS: 183 U/L / ALT: 18 U/L DA / AST: 15 U/L / GGT: x               Microbiology: reviewed     Culture - Sputum (collected 04-24-22 @ 12:35)  Source: .Sputum Sputum  Gram Stain (04-24-22 @ 15:10):    Few Squamous epithelial cells per low power field    Moderate polymorphonuclear leukocytes per low power field    Moderate Gram Negative Rods per oil power field  Final Report (04-27-22 @ 07:34):    Moderate Pseudomonas aeruginosa (Carbapenem Resistant)    Moderate Serratia marcescens    Normal Respiratory Elvira present  Organism: Pseudomonas aeruginosa (Carbapenem Resistant)  Serratia marcescens (04-27-22 @ 07:34)  Organism: Serratia marcescens (04-27-22 @ 07:34)      -  Amikacin: S <=16      -  Amoxicillin/Clavulanic Acid: R >16/8      -  Ampicillin: R >16 These ampicillin results predict results for amoxicillin      -  Ampicillin/Sulbactam: R >16/8 Enterobacter, Klebsiella aerogenes, Citrobacter, and Serratia may develop resistance during prolonged therapy (3-4 days)      -  Aztreonam: R >16      -  Cefazolin: R >16 Enterobacter, Klebsiella aerogenes, Citrobacter, and Serratia may develop resistance during prolonged therapy (3-4 days)      -  Cefepime: S 8      -  Cefoxitin: R >16      -  Ceftriaxone: R >32 Enterobacter, Klebsiella aerogenes, Citrobacter, and Serratia may develop resistance during prolonged therapy      -  Ciprofloxacin: R 2      -  Ertapenem: S <=0.5      -  Gentamicin: S <=2      -  Levofloxacin: R 2      -  Meropenem: S <=1      -  Piperacillin/Tazobactam: S 16      -  Tobramycin: S 4      -  Trimethoprim/Sulfamethoxazole: S <=0.5/9.5      Method Type: PRATIK  Organism: Pseudomonas aeruginosa (Carbapenem Resistant) (04-27-22 @ 07:34)      -  Amikacin: S <=16      -  Aztreonam: S <=4      -  Cefepime: S 8      -  Ceftazidime: S 8      -  Ciprofloxacin: S <=0.25      -  Gentamicin: S <=2      -  Imipenem: R >8      -  Levofloxacin: S <=0.5      -  Meropenem: R 8      -  Piperacillin/Tazobactam: S 16      -  Tobramycin: S <=2      Method Type: PRTAIK    Culture - Other (collected 04-22-22 @ 20:58)  Source: Skin Skin  Final Report (04-25-22 @ 21:04):    Culture yields growth of greater than 3 colony types of    Call client services within 7 days if further workup is clinically    indicated.    Culture - Urine (collected 04-21-22 @ 18:36)  Source: Clean Catch Clean Catch (Midstream)  Final Report (04-24-22 @ 08:46):    >100,000 CFU/ml Escherichia coli ESBL  Organism: Escherichia coli ESBL (04-24-22 @ 08:46)  Organism: Escherichia coli ESBL (04-24-22 @ 08:46)      -  Amikacin: S <=16      -  Amoxicillin/Clavulanic Acid: I 16/8      -  Ampicillin: R >16 These ampicillin results predict results for amoxicillin      -  Ampicillin/Sulbactam: R >16/8 Enterobacter, Klebsiella aerogenes, Citrobacter, and Serratia may develop resistance during prolonged therapy (3-4 days)      -  Aztreonam: R >16      -  Cefazolin: R >16 (MIC_CL_COM_ENTERIC_CEFAZU) For uncomplicated UTI with K. pneumoniae, E. coli, or P. mirablis: PRATIK <=16 is sensitive and PRATIK >=32 is resistant. This also predicts results for oral agents cefaclor, cefdinir, cefpodoxime, cefprozil, cefuroxime axetil, cephalexin and locarbef for uncomplicated UTI. Note that some isolates may be susceptible to these agents while testing resistant to cefazolin.      -  Cefepime: R >16      -  Ceftriaxone: R >32 Enterobacter, Klebsiella aerogenes, Citrobacter, and Serratia may develop resistance during prolonged therapy      -  Ciprofloxacin: R >2      -  Ertapenem: S <=0.5      -  Gentamicin: R >8      -  Imipenem: S <=1      -  Levofloxacin: R 4      -  Meropenem: S <=1      -  Nitrofurantoin: S <=32 Should not be used to treat pyelonephritis      -  Piperacillin/Tazobactam: S <=8      -  Tigecycline: S <=2      -  Tobramycin: R >8      -  Trimethoprim/Sulfamethoxazole: R >2/38      Method Type: PRATIK    Culture - Blood (collected 04-21-22 @ 18:24)  Source: .Blood Blood-Peripheral  Final Report (04-26-22 @ 19:00):    No Growth Final    Culture - Blood (collected 04-21-22 @ 18:24)  Source: .Blood Blood-Peripheral  Final Report (04-26-22 @ 19:00):    No Growth Final      Radiology: reviewed     Medications:  acetaminophen     Tablet .. 650 milliGRAM(s) Oral every 6 hours PRN  ALBUTerol    90 MICROgram(s) HFA Inhaler 2 Puff(s) Inhalation every 6 hours PRN  chlorhexidine 0.12% Liquid 15 milliLiter(s) Oral Mucosa every 12 hours  chlorhexidine 2% Cloths 1 Application(s) Topical daily  collagenase Ointment 1 Application(s) Topical daily  dextrose 5%. 1000 milliLiter(s) IV Continuous <Continuous>  dextrose 5%. 1000 milliLiter(s) IV Continuous <Continuous>  dextrose 50% Injectable 25 Gram(s) IV Push once  dextrose 50% Injectable 12.5 Gram(s) IV Push once  dextrose 50% Injectable 25 Gram(s) IV Push once  dextrose Oral Gel 15 Gram(s) Oral once PRN  folic acid 1 milliGRAM(s) Oral daily  glucagon  Injectable 1 milliGRAM(s) IntraMuscular once  heparin   Injectable 5000 Unit(s) SubCutaneous every 12 hours  insulin glargine Injectable (LANTUS) 10 Unit(s) SubCutaneous every morning  insulin lispro (ADMELOG) corrective regimen sliding scale   SubCutaneous every 6 hours  lactobacillus acidophilus 1 Tablet(s) Oral two times a day  LORazepam     Tablet 1 milliGRAM(s) Oral every 4 hours  LORazepam   Injectable 1 milliGRAM(s) IV Push every 6 hours PRN  methocarbamol 500 milliGRAM(s) Oral two times a day  mineral oil/petrolatum Hydrophilic Ointment 1 Application(s) Topical daily  pantoprazole  Injectable 40 milliGRAM(s) IV Push daily  piperacillin/tazobactam IVPB.. 3.375 Gram(s) IV Intermittent every 8 hours  polyethylene glycol 3350 17 Gram(s) Oral two times a day  povidone iodine 10% Ointment 1 Application(s) Topical every 12 hours  senna 2 Tablet(s) Oral at bedtime  simethicone 80 milliGRAM(s) Chew every 6 hours  sodium chloride 0.9% lock flush 10 milliLiter(s) IV Push every 1 hour PRN  sucralfate 1 Gram(s) Oral four times a day    Antimicrobials:  piperacillin/tazobactam IVPB.. 3.375 Gram(s) IV Intermittent every 8 hours

## 2022-04-27 NOTE — PROGRESS NOTE ADULT - ASSESSMENT
75 y/o F with PMH of HTN, DM type 2, CVA, recent Cardiac Arrest, Chronic Respiratory Failure, Vent Dependant s/p trach & PEG, Anemia with GI blood loss, and Dementia who was sent from [facility] facility for acute respiratory distress, fevers, hypotension.    sepsis  hypotension  OP  OA  chr resp failure  cva  hx of card arrest  DM      emp ABX  cx - biomarkers  ct imaging reviewed  labs reviewed  assist with needs    with HCP   pt is full code  old records reviewed  GOC documented

## 2022-04-27 NOTE — PROGRESS NOTE ADULT - ASSESSMENT
75 y/o F with PMH of HTN, DM type 2, CVA, recent Cardiac Arrest, Chronic Respiratory Failure, Vent Dependant s/p trach & PEG, Anemia with GI blood loss, and Dementia who was sent from [facility] facility for acute respiratory distress, fevers, hypotension. Patient unable to contribute to hx due to mental status.  In the ED: wbc 14.15, HGB 6.0, INR 1.40, sodium 134, cr 1.36, lactate 2.8. UA sig for mod LE, large blood, wbc, bacteria. Given Zosyn and ns.     RECOMMENDATIONS  Cx from 4/21 reviewed:  sputum cx w/ pseudomonas and serratia  urine cx w/ ESBL E Coli but with low PRATIK to zosyn and zosyn concentrates in the urine  leukocytosis resolved; off pressors  vent settings improved from admission  f/u blood cx-NGTD  C/w zosyn to complete 7 day course of antibiotics (last day today)    Diarrhea:  C. diff indeterminate  nurse reports stool is becoming more formed  however, if diarrhea persists would repeat test    Emil Medellin M.D.  [facility], Division of Infectious Diseases  [phone]  After 5pm on weekdays and all day on weekends - please call [phone]

## 2022-04-27 NOTE — PROGRESS NOTE ADULT - PROVIDER SPECIALTY LIST ADULT
Spoke with patient with regarding time of arrival for procedure that is scheduled on 5/18/17. Patient verbalized instructions and confirmed procedure date and time of arrival on 5/18/17 at 815 AM.   Hospitalist

## 2022-04-27 NOTE — PROGRESS NOTE ADULT - ASSESSMENT
76 year old female admitted for Acute on Chronic Respiratory Failure secondary to pna also found to have UTI and brief  septic shock.     1. chronic resp failure   2. UTI  3. PNA  4. septic shock   5. anemia     Plan  Neuro: ativan 1mg q4 and prn   Cardio: HD stable; shock resolved   Pulm: Titrated to 40% fi02 from 50%, vent bundle in place, aspiration precautions, recommend low tidal volume , actively titrate to sat of 92% and higher, not candidate for further weaning at this time   GI: tube feeds as tolerated, PPI, Carafate , bowel regimen   Renal:  ID: Finishing day 7 course of zosyn for sputum cx w/ pseudomonas and serratia and urine cx w/ ESBL E Coli. monitor for loose stools   Heme: anemia chronic monitor h/h on sq heparin + SCDs for dvt ppx. may need transfusion in future   Endo: ISS q6 with lantus 10units every morning       Dispo: Remains in [facility]

## 2022-04-27 NOTE — PROGRESS NOTE ADULT - ASSESSMENT
The patient is a 76 year old female with a history of HTN, DM, CVA, dementia, chronic respiratory failure s/p trach, PEG, cardiac arrest, anemia who presented with respiratory distress, fevers, hypotension.    Plan:  - Echo 9/21 with normal LV systolic function, mod pulm HTN  - CT chest with bilateral infiltrates and pleural effusions  - Hemoglobin low but stable  - On zosyn  - Mechanical ventilation  - Overall prognosis remains poor

## 2022-04-27 NOTE — PROGRESS NOTE ADULT - SUBJECTIVE AND OBJECTIVE BOX
INTERVAL HPI/OVERNIGHT EVENTS:  No new overnight event.  No N/V/D.  Tolerating diet via peg    Allergies    codeine (Hives)    Intolerances          General:  No wt loss, fevers, chills, night sweats, fatigue,   Eyes:  Good vision, no reported pain  ENT:  No sore throat, pain, runny nose, dysphagia  CV:  No pain, palpitations, hypo/hypertension  Resp:  No dyspnea, cough, tachypnea, wheezing  GI:  No pain, No nausea, No vomiting, No diarrhea, No constipation, No weight loss, No fever, No pruritis, No rectal bleeding, No tarry stools, No dysphagia,  :  No pain, bleeding, incontinence, nocturia  Muscle:  No pain, weakness  Neuro:  No weakness, tingling, memory problems  Psych:  No fatigue, insomnia, mood problems, depression  Endocrine:  No polyuria, polydipsia, cold/heat intolerance  Heme:  No petechiae, ecchymosis, easy bruisability  Skin:  No rash, tattoos, scars, edema      PHYSICAL EXAM:   Vital Signs:  Vital Signs Last 24 Hrs  T(C): 36.4 (2022 08:20), Max: 37.4 (2022 08:59)  T(F): 97.6 (2022 08:20), Max: 99.4 (2022 08:59)  HR: 91 (2022 08:00) (78 - 106)  BP: 134/72 (2022 08:00) (102/66 - 137/73)  BP(mean): 91 (2022 08:00) (71 - 102)  RR: 21 (2022 08:00) (16 - 32)  SpO2: 97% (2022 08:00) (95% - 100%)  Daily     Daily Weight in k (2022 03:49)I&O's Summary    2022 07:01  -  2022 07:00  --------------------------------------------------------  IN: 1105 mL / OUT: 1100 mL / NET: 5 mL        GENERAL:  Appears stated age, well-groomed, well-nourished, no distress  HEENT:  NC/AT,  conjunctivae clear and pink, no thyromegaly, nodules, adenopathy, no JVD, sclera -anicteric  CHEST:  Full & symmetric excursion, no increased effort, breath sounds clear  HEART:  Regular rhythm, S1, S2, no murmur/rub/S3/S4, no abdominal bruit, no edema  ABDOMEN:  Soft, non-tender, non-distended, normoactive bowel sounds,  no masses ,no hepato-splenomegaly, no signs of chronic liver disease  EXTEREMITIES:  no cyanosis,clubbing or edema  SKIN:  No rash/erythema/ecchymoses/petechiae/wounds/abscess/warm/dry  NEURO:  Alert, oriented, no asterixis, no tremor, no encephalopathy      LABS:                        7.5    10.48 )-----------( 269      ( 2022 06:39 )             25.3         138  |  105  |  32<H>  ----------------------------<  209<H>  4.5   |  26  |  1.15    Ca    8.9      2022 06:31    TPro  6.8  /  Alb  1.7<L>  /  TBili  0.2  /  DBili  x   /  AST  15  /  ALT  18  /  AlkPhos  183<H>          amylase   lipase  RADIOLOGY & ADDITIONAL TESTS:

## 2022-04-27 NOTE — PROGRESS NOTE ADULT - SUBJECTIVE AND OBJECTIVE BOX
Patient is a 76y old  Female who presents with a chief complaint of Anemia (27 Apr 2022 10:49)      BRIEF HOSPITAL COURSE:   76 year old female PMHx of  advanced dementia s/p PEG with severe contractures, hx of cardiac arrest with chronic resp failure s/p trach/PEG,  hx of subarachnoid hemorrhage, hx of CVA, COPD , HTN, DM2, GERD who was admitted on 4/21 from local skilled nursing facility for acute respiratory distress found to have multifocal pneumonia, ESBL e coli UTI and was in brief septic shock. Toning she remains on full vent support, she is no longer requiring IV vasopressor therapy. Leukocytosis resolved , the sputum culture was found to have pseudomonas and serratia sensitivities, blood cultures were negative, the urine is consistent with ESBL E Coli and continuing on Zosyn       Events last 24 hours:   tonight have weaned fi02 to 40% from 50%    PAST MEDICAL & SURGICAL HISTORY:  Dementia of frontal lobe type    Aphasic stroke    Diabetes mellitus    Respiratory failure    Hypertension    GERD (gastroesophageal reflux disease)    Constipation    Respiratory failure    CVA (cerebral vascular accident)    HTN (hypertension)    DM (diabetes mellitus)    Advanced dementia    COVID-19 virus detected    Quadriplegia    Pneumonia    Type II diabetes mellitus    Hx of appendectomy    Gastrostomy in place    Tracheostomy in place    Tracheostomy tube present    Feeding by G-tube      Allergies    codeine (Hives)    Intolerances      FAMILY HISTORY:  No pertinent family history in first degree relatives        Review of Systems:  unable to assess 2/2 chronic trach    social hx:  unable to assess 2/2 chronic trach  Medications:  piperacillin/tazobactam IVPB.. 3.375 Gram(s) IV Intermittent every 8 hours      ALBUTerol    90 MICROgram(s) HFA Inhaler 2 Puff(s) Inhalation every 6 hours PRN    acetaminophen     Tablet .. 650 milliGRAM(s) Oral every 6 hours PRN  LORazepam     Tablet 1 milliGRAM(s) Oral every 4 hours  LORazepam   Injectable 1 milliGRAM(s) IV Push every 6 hours PRN  methocarbamol 500 milliGRAM(s) Oral two times a day      heparin   Injectable 5000 Unit(s) SubCutaneous every 12 hours    pantoprazole  Injectable 40 milliGRAM(s) IV Push daily  polyethylene glycol 3350 17 Gram(s) Oral two times a day  senna 2 Tablet(s) Oral at bedtime  simethicone 80 milliGRAM(s) Chew every 6 hours  sucralfate 1 Gram(s) Oral four times a day      dextrose 50% Injectable 25 Gram(s) IV Push once  dextrose 50% Injectable 12.5 Gram(s) IV Push once  dextrose 50% Injectable 25 Gram(s) IV Push once  dextrose Oral Gel 15 Gram(s) Oral once PRN  glucagon  Injectable 1 milliGRAM(s) IntraMuscular once  insulin glargine Injectable (LANTUS) 10 Unit(s) SubCutaneous every morning  insulin lispro (ADMELOG) corrective regimen sliding scale   SubCutaneous every 6 hours    dextrose 5%. 1000 milliLiter(s) IV Continuous <Continuous>  dextrose 5%. 1000 milliLiter(s) IV Continuous <Continuous>  folic acid 1 milliGRAM(s) Oral daily  multivitamin 1 Tablet(s) Oral daily  sodium chloride 0.9% lock flush 10 milliLiter(s) IV Push every 1 hour PRN      chlorhexidine 0.12% Liquid 15 milliLiter(s) Oral Mucosa every 12 hours  chlorhexidine 2% Cloths 1 Application(s) Topical daily  collagenase Ointment 1 Application(s) Topical daily  mineral oil/petrolatum Hydrophilic Ointment 1 Application(s) Topical daily  povidone iodine 10% Ointment 1 Application(s) Topical every 12 hours    lactobacillus acidophilus 1 Tablet(s) Oral two times a day      Mode: AC/ CMV (Assist Control/ Continuous Mandatory Ventilation)  RR (machine): 18  TV (machine): 400  FiO2: 50  PEEP: 5  ITime: 1  MAP: 16  PIP: 32      ICU Vital Signs Last 24 Hrs  T(C): 36.6 (27 Apr 2022 20:21), Max: 36.9 (27 Apr 2022 03:49)  T(F): 97.8 (27 Apr 2022 20:21), Max: 98.4 (27 Apr 2022 03:49)  HR: 91 (27 Apr 2022 17:00) (81 - 106)  BP: 137/71 (27 Apr 2022 17:00) (92/56 - 141/80)  BP(mean): 90 (27 Apr 2022 17:00) (68 - 102)  ABP: --  ABP(mean): --  RR: 41 (27 Apr 2022 17:00) (16 - 41)  SpO2: 98% (27 Apr 2022 17:00) (97% - 100%)    Vital Signs Last 24 Hrs  T(C): 36.6 (27 Apr 2022 20:21), Max: 36.9 (27 Apr 2022 03:49)  T(F): 97.8 (27 Apr 2022 20:21), Max: 98.4 (27 Apr 2022 03:49)  HR: 91 (27 Apr 2022 17:00) (81 - 106)  BP: 137/71 (27 Apr 2022 17:00) (92/56 - 141/80)  BP(mean): 90 (27 Apr 2022 17:00) (68 - 102)  RR: 41 (27 Apr 2022 17:00) (16 - 41)  SpO2: 98% (27 Apr 2022 17:00) (97% - 100%)        I&O's Detail    26 Apr 2022 07:01  -  27 Apr 2022 07:00  --------------------------------------------------------  IN:    Enteral Tube Flush: 385 mL    Glucerna 1.5: 720 mL  Total IN: 1105 mL    OUT:    Indwelling Catheter - Urethral (mL): 1100 mL  Total OUT: 1100 mL    Total NET: 5 mL      27 Apr 2022 07:01  -  27 Apr 2022 20:24  --------------------------------------------------------  IN:    Enteral Tube Flush: 180 mL    Glucerna 1.5: 360 mL  Total IN: 540 mL    OUT:    Indwelling Catheter - Urethral (mL): 240 mL  Total OUT: 240 mL    Total NET: 300 mL            LABS:                        7.2    9.20  )-----------( 287      ( 27 Apr 2022 08:50 )             24.3     04-27    138  |  105  |  32<H>  ----------------------------<  209<H>  4.5   |  26  |  1.15    Ca    8.9      27 Apr 2022 06:31    TPro  6.8  /  Alb  1.7<L>  /  TBili  0.2  /  DBili  x   /  AST  15  /  ALT  18  /  AlkPhos  183<H>  04-27          CAPILLARY BLOOD GLUCOSE      POCT Blood Glucose.: 208 mg/dL (27 Apr 2022 16:29)        CULTURES:  Culture Results:   Moderate Pseudomonas aeruginosa (Carbapenem Resistant)  Moderate Serratia marcescens  Normal Respiratory Elvira present (04-24 @ 12:35)  Culture Results:   Culture yields growth of greater than 3 colony types of  Call client services within 7 days if further workup is clinically  indicated. (04-22 @ 20:58)  C Diff by PCR Result: Indeterminate (04-22 @ 12:07)  Culture Results:   >100,000 CFU/ml Escherichia coli ESBL (04-21 @ 18:36)  Culture Results:   No Growth Final (04-21 @ 18:24)  Culture Results:   No Growth Final (04-21 @ 18:24)      Physical Examination:    General: Elderly female in bed, chronic ill appearing    HEENT: Pupils equal, reactive to light.  Symmetric.    PULM: b/l air entry     CVS: +S1/s2    ABD: Soft, nondistended, nontender, normoactive bowel sounds, no masses + peg tube     EXT: non tender     SKIN: Warm     Neuro: unresponsive     RADIOLOGY:   < from: Xray Chest 1 View- PORTABLE-Urgent (Xray Chest 1 View- PORTABLE-Urgent .) (04.21.22 @ 18:50) >    INTERPRETATION:  Portable chest radiograph    CLINICAL INFORMATION: Line placement    TECHNIQUE:  Portable  AP chest radiograph.    COMPARISON: 1/6/2022 chest radiograph .    FINDINGS:  CATHETERS AND TUBES: LEFT IJ catheter tip in SVC.  Tracheostomy tube in place.      PULMONARY: Increased perihilar diffuse airspace disease and/or effusions   obscuring LEFT diaphragm contour..   No pneumothorax.    HEART/VASCULAR: The heart and mediastinum size and configuration are   within normal limits.    BONES: Visualized osseous structures are intact.    IMPRESSION:   LEFT IJ catheter tip in SVC.  Increased bilateral perihilar diffuse airspace disease and/or effusions   obscuring LEFT diaphragm contour..    < end of copied text >

## 2022-04-27 NOTE — PROGRESS NOTE ADULT - SUBJECTIVE AND OBJECTIVE BOX
Neurology follow up note    BREA [id number]      Interval History:    Patient resting in bed     Allergies    codeine (Hives)    Intolerances        MEDICATIONS    acetaminophen     Tablet .. 650 milliGRAM(s) Oral every 6 hours PRN  ALBUTerol    90 MICROgram(s) HFA Inhaler 2 Puff(s) Inhalation every 6 hours PRN  chlorhexidine 0.12% Liquid 15 milliLiter(s) Oral Mucosa every 12 hours  chlorhexidine 2% Cloths 1 Application(s) Topical daily  collagenase Ointment 1 Application(s) Topical daily  dextrose 5%. 1000 milliLiter(s) IV Continuous <Continuous>  dextrose 5%. 1000 milliLiter(s) IV Continuous <Continuous>  dextrose 50% Injectable 25 Gram(s) IV Push once  dextrose 50% Injectable 12.5 Gram(s) IV Push once  dextrose 50% Injectable 25 Gram(s) IV Push once  dextrose Oral Gel 15 Gram(s) Oral once PRN  folic acid 1 milliGRAM(s) Oral daily  glucagon  Injectable 1 milliGRAM(s) IntraMuscular once  heparin   Injectable 5000 Unit(s) SubCutaneous every 12 hours  insulin glargine Injectable (LANTUS) 10 Unit(s) SubCutaneous every morning  insulin lispro (ADMELOG) corrective regimen sliding scale   SubCutaneous every 6 hours  lactobacillus acidophilus 1 Tablet(s) Oral two times a day  LORazepam     Tablet 1 milliGRAM(s) Oral every 4 hours  LORazepam   Injectable 1 milliGRAM(s) IV Push every 6 hours PRN  methocarbamol 500 milliGRAM(s) Oral two times a day  mineral oil/petrolatum Hydrophilic Ointment 1 Application(s) Topical daily  multivitamin 1 Tablet(s) Oral daily  pantoprazole  Injectable 40 milliGRAM(s) IV Push daily  piperacillin/tazobactam IVPB.. 3.375 Gram(s) IV Intermittent every 8 hours  polyethylene glycol 3350 17 Gram(s) Oral two times a day  povidone iodine 10% Ointment 1 Application(s) Topical every 12 hours  senna 2 Tablet(s) Oral at bedtime  simethicone 80 milliGRAM(s) Chew every 6 hours  sodium chloride 0.9% lock flush 10 milliLiter(s) IV Push every 1 hour PRN  sucralfate 1 Gram(s) Oral four times a day              Vital Signs Last 24 Hrs  T(C): 36.4 (27 Apr 2022 08:20), Max: 37.1 (26 Apr 2022 17:00)  T(F): 97.6 (27 Apr 2022 08:20), Max: 98.7 (26 Apr 2022 17:00)  HR: 90 (27 Apr 2022 10:22) (78 - 106)  BP: 134/72 (27 Apr 2022 08:00) (102/66 - 137/73)  BP(mean): 91 (27 Apr 2022 08:00) (71 - 102)  RR: 21 (27 Apr 2022 08:00) (16 - 32)  SpO2: 100% (27 Apr 2022 10:22) (95% - 100%)        REVIEW OF SYSTEMS:  Cannot be obtained secondary to the patient being nonverbal.    PHYSICAL EXAMINATION:    HEENT:  Head:  Normocephalic.  Eyes:  No scleral icterus.  Ears:  Unable to evaluate with the patient being nonverbal and unresponsive.  NECK:  Tracheostomy was in place.  RESPIRATORY: Decreased breath sounds bilaterally.  ABDOMEN: Soft and nontender.  EXTREMITIES:  No clubbing or cyanosis was noted.      NEUROLOGIC:  No response to verbal stimuli.  I opened the patient's eyes, no blink to visual threat.  To painful stimuli, slight withdrawal bilateral upper and lower.  Motor:  Bilateral upper extremity was in flexed position.  Bilateral lower extremities were straight.  Upon stimulation, the patient would have subtle shaking.            LABS:  CBC Full  -  ( 27 Apr 2022 08:50 )  WBC Count : 9.20 K/uL  RBC Count : 2.98 M/uL  Hemoglobin : 7.2 g/dL  Hematocrit : 24.3 %  Platelet Count - Automated : 287 K/uL  Mean Cell Volume : 81.5 fl  Mean Cell Hemoglobin : 24.2 pg  Mean Cell Hemoglobin Concentration : 29.6 gm/dL  Auto Neutrophil # : 7.02 K/uL  Auto Lymphocyte # : 1.09 K/uL  Auto Monocyte # : 0.82 K/uL  Auto Eosinophil # : 0.17 K/uL  Auto Basophil # : 0.04 K/uL  Auto Neutrophil % : 76.4 %  Auto Lymphocyte % : 11.8 %  Auto Monocyte % : 8.9 %  Auto Eosinophil % : 1.8 %  Auto Basophil % : 0.4 %      04-27    138  |  105  |  32<H>  ----------------------------<  209<H>  4.5   |  26  |  1.15    Ca    8.9      27 Apr 2022 06:31    TPro  6.8  /  Alb  1.7<L>  /  TBili  0.2  /  DBili  x   /  AST  15  /  ALT  18  /  AlkPhos  183<H>  04-27    Hemoglobin A1C:     LIVER FUNCTIONS - ( 27 Apr 2022 06:31 )  Alb: 1.7 g/dL / Pro: 6.8 g/dL / ALK PHOS: 183 U/L / ALT: 18 U/L DA / AST: 15 U/L / GGT: x           Vitamin B12         RADIOLOGY      ANALYSIS AND PLAN:  This is a 76-year-old with altered mental status and history of abnormal movements.  For altered mental status, most likely metabolic encephalopathy secondary to underlying pneumonia-type process along with signs of dehydration from SMA-7.  Antibiotics as needed.  Monitor renal function.  For abnormal movements, the patient has been evaluated multiple times in the past, as per my conversation with spouse, these were deemed nonepileptiform, at present we will refrain from any antiseizure medication and had multiple conversations in the past with spouse, he does not want any.  For history of spasms, continue the patient on muscle relaxants.  For history of diabetes, strict control of blood sugars.  If possible, please maintain a systolic blood pressure above 100 to help maintain cerebral perfusion.    Greater than 22 minutes of time spent with the patient, planning care, reviewing data, and speaking to multidisciplinary healthcare team with greater than 50% of that time in counseling and care coordination.

## 2022-04-28 LAB
ALBUMIN SERPL ELPH-MCNC: 1.8 G/DL — LOW (ref 3.3–5)
ALP SERPL-CCNC: 173 U/L — HIGH (ref 30–120)
ALT FLD-CCNC: 16 U/L DA — SIGNIFICANT CHANGE UP (ref 10–60)
ANION GAP SERPL CALC-SCNC: 7 MMOL/L — SIGNIFICANT CHANGE UP (ref 5–17)
AST SERPL-CCNC: 12 U/L — SIGNIFICANT CHANGE UP (ref 10–40)
BASOPHILS # BLD AUTO: 0.02 K/UL — SIGNIFICANT CHANGE UP (ref 0–0.2)
BASOPHILS NFR BLD AUTO: 0.3 % — SIGNIFICANT CHANGE UP (ref 0–2)
BILIRUB SERPL-MCNC: 0.2 MG/DL — SIGNIFICANT CHANGE UP (ref 0.2–1.2)
BLD GP AB SCN SERPL QL: SIGNIFICANT CHANGE UP
BUN SERPL-MCNC: 31 MG/DL — HIGH (ref 7–23)
CALCIUM SERPL-MCNC: 8.9 MG/DL — SIGNIFICANT CHANGE UP (ref 8.4–10.5)
CHLORIDE SERPL-SCNC: 102 MMOL/L — SIGNIFICANT CHANGE UP (ref 96–108)
CO2 SERPL-SCNC: 26 MMOL/L — SIGNIFICANT CHANGE UP (ref 22–31)
CREAT SERPL-MCNC: 1.1 MG/DL — SIGNIFICANT CHANGE UP (ref 0.5–1.3)
EGFR: 51 ML/MIN/1.73M2 — LOW
EOSINOPHIL # BLD AUTO: 0.17 K/UL — SIGNIFICANT CHANGE UP (ref 0–0.5)
EOSINOPHIL NFR BLD AUTO: 2.6 % — SIGNIFICANT CHANGE UP (ref 0–6)
GLUCOSE BLDC GLUCOMTR-MCNC: 175 MG/DL — HIGH (ref 70–99)
GLUCOSE BLDC GLUCOMTR-MCNC: 178 MG/DL — HIGH (ref 70–99)
GLUCOSE BLDC GLUCOMTR-MCNC: 179 MG/DL — HIGH (ref 70–99)
GLUCOSE BLDC GLUCOMTR-MCNC: 188 MG/DL — HIGH (ref 70–99)
GLUCOSE BLDC GLUCOMTR-MCNC: 207 MG/DL — HIGH (ref 70–99)
GLUCOSE SERPL-MCNC: 186 MG/DL — HIGH (ref 70–99)
HCT VFR BLD CALC: 25 % — LOW (ref 34.5–45)
HGB BLD-MCNC: 7.3 G/DL — LOW (ref 11.5–15.5)
IMM GRANULOCYTES NFR BLD AUTO: 1.6 % — HIGH (ref 0–1.5)
LYMPHOCYTES # BLD AUTO: 1.21 K/UL — SIGNIFICANT CHANGE UP (ref 1–3.3)
LYMPHOCYTES # BLD AUTO: 18.8 % — SIGNIFICANT CHANGE UP (ref 13–44)
MCHC RBC-ENTMCNC: 24.1 PG — LOW (ref 27–34)
MCHC RBC-ENTMCNC: 29.2 GM/DL — LOW (ref 32–36)
MCV RBC AUTO: 82.5 FL — SIGNIFICANT CHANGE UP (ref 80–100)
MONOCYTES # BLD AUTO: 0.72 K/UL — SIGNIFICANT CHANGE UP (ref 0–0.9)
MONOCYTES NFR BLD AUTO: 11.2 % — SIGNIFICANT CHANGE UP (ref 2–14)
NEUTROPHILS # BLD AUTO: 4.22 K/UL — SIGNIFICANT CHANGE UP (ref 1.8–7.4)
NEUTROPHILS NFR BLD AUTO: 65.5 % — SIGNIFICANT CHANGE UP (ref 43–77)
NRBC # BLD: 0 /100 WBCS — SIGNIFICANT CHANGE UP (ref 0–0)
PLATELET # BLD AUTO: 323 K/UL — SIGNIFICANT CHANGE UP (ref 150–400)
POTASSIUM SERPL-MCNC: 4.4 MMOL/L — SIGNIFICANT CHANGE UP (ref 3.5–5.3)
POTASSIUM SERPL-SCNC: 4.4 MMOL/L — SIGNIFICANT CHANGE UP (ref 3.5–5.3)
PROT SERPL-MCNC: 6.7 G/DL — SIGNIFICANT CHANGE UP (ref 6–8.3)
RBC # BLD: 3.03 M/UL — LOW (ref 3.8–5.2)
RBC # FLD: 20.4 % — HIGH (ref 10.3–14.5)
SARS-COV-2 RNA SPEC QL NAA+PROBE: SIGNIFICANT CHANGE UP
SODIUM SERPL-SCNC: 135 MMOL/L — SIGNIFICANT CHANGE UP (ref 135–145)
WBC # BLD: 6.44 K/UL — SIGNIFICANT CHANGE UP (ref 3.8–10.5)
WBC # FLD AUTO: 6.44 K/UL — SIGNIFICANT CHANGE UP (ref 3.8–10.5)

## 2022-04-28 PROCEDURE — 99233 SBSQ HOSP IP/OBS HIGH 50: CPT

## 2022-04-28 RX ADMIN — CHLORHEXIDINE GLUCONATE 15 MILLILITER(S): 213 SOLUTION TOPICAL at 17:26

## 2022-04-28 RX ADMIN — Medication 1 MILLIGRAM(S): at 11:39

## 2022-04-28 RX ADMIN — Medication 1 GRAM(S): at 11:39

## 2022-04-28 RX ADMIN — Medication 1 TABLET(S): at 08:02

## 2022-04-28 RX ADMIN — POLYETHYLENE GLYCOL 3350 17 GRAM(S): 17 POWDER, FOR SOLUTION ORAL at 08:11

## 2022-04-28 RX ADMIN — Medication 2: at 11:39

## 2022-04-28 RX ADMIN — Medication 1 MILLIGRAM(S): at 20:41

## 2022-04-28 RX ADMIN — HEPARIN SODIUM 5000 UNIT(S): 5000 INJECTION INTRAVENOUS; SUBCUTANEOUS at 17:26

## 2022-04-28 RX ADMIN — SIMETHICONE 80 MILLIGRAM(S): 80 TABLET, CHEWABLE ORAL at 08:12

## 2022-04-28 RX ADMIN — Medication 1 MILLIGRAM(S): at 08:19

## 2022-04-28 RX ADMIN — SIMETHICONE 80 MILLIGRAM(S): 80 TABLET, CHEWABLE ORAL at 23:50

## 2022-04-28 RX ADMIN — Medication 1 APPLICATION(S): at 08:11

## 2022-04-28 RX ADMIN — Medication 1 APPLICATION(S): at 11:40

## 2022-04-28 RX ADMIN — Medication 1 APPLICATION(S): at 11:41

## 2022-04-28 RX ADMIN — PIPERACILLIN AND TAZOBACTAM 25 GRAM(S): 4; .5 INJECTION, POWDER, LYOPHILIZED, FOR SOLUTION INTRAVENOUS at 04:22

## 2022-04-28 RX ADMIN — Medication 1 MILLIGRAM(S): at 16:02

## 2022-04-28 RX ADMIN — PANTOPRAZOLE SODIUM 40 MILLIGRAM(S): 20 TABLET, DELAYED RELEASE ORAL at 11:40

## 2022-04-28 RX ADMIN — Medication 1 TABLET(S): at 11:39

## 2022-04-28 RX ADMIN — CHLORHEXIDINE GLUCONATE 15 MILLILITER(S): 213 SOLUTION TOPICAL at 08:02

## 2022-04-28 RX ADMIN — Medication 1 GRAM(S): at 23:50

## 2022-04-28 RX ADMIN — Medication 4: at 05:07

## 2022-04-28 RX ADMIN — Medication 1 GRAM(S): at 17:26

## 2022-04-28 RX ADMIN — Medication 1 TABLET(S): at 17:26

## 2022-04-28 RX ADMIN — METHOCARBAMOL 500 MILLIGRAM(S): 500 TABLET, FILM COATED ORAL at 17:26

## 2022-04-28 RX ADMIN — Medication 2: at 23:50

## 2022-04-28 RX ADMIN — Medication 1 MILLIGRAM(S): at 04:21

## 2022-04-28 RX ADMIN — INSULIN GLARGINE 10 UNIT(S): 100 INJECTION, SOLUTION SUBCUTANEOUS at 08:22

## 2022-04-28 RX ADMIN — Medication 1 GRAM(S): at 08:12

## 2022-04-28 RX ADMIN — HEPARIN SODIUM 5000 UNIT(S): 5000 INJECTION INTRAVENOUS; SUBCUTANEOUS at 08:02

## 2022-04-28 RX ADMIN — Medication 2: at 17:30

## 2022-04-28 RX ADMIN — Medication 1 MILLIGRAM(S): at 15:50

## 2022-04-28 RX ADMIN — METHOCARBAMOL 500 MILLIGRAM(S): 500 TABLET, FILM COATED ORAL at 08:10

## 2022-04-28 RX ADMIN — CHLORHEXIDINE GLUCONATE 1 APPLICATION(S): 213 SOLUTION TOPICAL at 11:39

## 2022-04-28 RX ADMIN — SIMETHICONE 80 MILLIGRAM(S): 80 TABLET, CHEWABLE ORAL at 17:26

## 2022-04-28 RX ADMIN — Medication 1 APPLICATION(S): at 17:29

## 2022-04-28 RX ADMIN — Medication 1 MILLIGRAM(S): at 23:50

## 2022-04-28 RX ADMIN — SIMETHICONE 80 MILLIGRAM(S): 80 TABLET, CHEWABLE ORAL at 11:39

## 2022-04-28 NOTE — PROGRESS NOTE ADULT - ASSESSMENT
75 y/o F with PMH of HTN, DM type 2, CVA, recent Cardiac Arrest, Chronic Respiratory Failure, Vent Dependant s/p trach & PEG, Anemia with GI blood loss, and Dementia who was sent from [facility] facility for acute respiratory distress, fevers, hypotension.    sepsis  hypotension  OP  OA  chr resp failure  cva  hx of card arrest  DM    Ativan dosing and scheduling in progress - monitor for agitation -     emp ABX  cx - biomarkers  ct imaging reviewed  labs reviewed  assist with needs    with HCP   pt is full code  old records reviewed  GOC documented

## 2022-04-28 NOTE — PROGRESS NOTE ADULT - SUBJECTIVE AND OBJECTIVE BOX
BREA BECKHAM    [facility]    Allergies    codeine (Hives)    Intolerances        PAST MEDICAL & SURGICAL HISTORY:  Dementia of frontal lobe type    Aphasic stroke    Diabetes mellitus    Respiratory failure    Hypertension    GERD (gastroesophageal reflux disease)    Constipation    Respiratory failure    CVA (cerebral vascular accident)    HTN (hypertension)    DM (diabetes mellitus)    Advanced dementia    COVID-19 virus detected    Quadriplegia    Pneumonia    Type II diabetes mellitus    Hx of appendectomy    Gastrostomy in place    Tracheostomy in place    Tracheostomy tube present    Feeding by G-tube        FAMILY HISTORY:  No pertinent family history in first degree relatives        Home Medications:  albuterol 90 mcg/inh inhalation aerosol with adapter: 2  inhaled every 6 hours (21 Apr 2022 14:01)  Bacid (LAC) oral tablet: 2 tab(s) by gastrostomy tube once a day (21 Apr 2022 13:45)  Basaglar KwikPen 100 units/mL subcutaneous solution: 36 unit(s) subcutaneous once a day (at bedtime) (21 Apr 2022 14:01)  Betadine 10% topical swab: cleanse right and left great toes (21 Apr 2022 14:01)  Carafate 1 g/10 mL oral suspension: 10 milliliter(s) by gastrostomy tube 4 times a day (before meals and at bedtime) for 14 days (Started 6/4/21) (21 Apr 2022 13:45)  chlorhexidine 0.12% mucous membrane liquid: 15 milliliter(s) mucous membrane 2 times a day (21 Apr 2022 13:45)  Eucerin topical cream: Apply topically to affected area once a day bilateral feet (21 Apr 2022 14:01)  ipratropium-albuterol 0.5 mg-2.5 mg/3 mL inhalation solution: 3 milliliter(s) inhaled 4 times a day (21 Apr 2022 13:45)  LORazepam 1 mg oral tablet: 1 tab(s) by gastrostomy tube every 4 hours (21 Apr 2022 14:01)  methocarbamol 500 mg oral tablet: 1 tab(s) by gastrostomy tube 2 times a day (21 Apr 2022 14:01)  pantoprazole: 20 milliliter(s) by gastrostomy tube 2 times a day (21 Apr 2022 14:01)  polyethylene glycol 3350 oral powder for reconstitution: 17 gram(s) by gastrostomy tube every 12 hours (21 Apr 2022 14:01)  senna 8.6 mg oral tablet: 3 tab(s) by gastrostomy tube once a day (at bedtime) (21 Apr 2022 13:45)  silver sulfADIAZINE 1% topical cream: Apply topically to affected area once a day to sacrum (21 Apr 2022 14:01)  simethicone 80 mg oral tablet, chewable: 1 tab(s) by gastrostomy tube every 6 hours (21 Apr 2022 14:01)  Tylenol 325 mg oral tablet: 2 tab(s) by gastrostomy tube once a day; 60 minutes prior to dressing change  (21 Apr 2022 13:45)  Tylenol 325 mg oral tablet: 2 tab(s) by gastrostomy tube every 6 hours, As Needed (21 Apr 2022 14:01)      MEDICATIONS  (STANDING):  chlorhexidine 0.12% Liquid 15 milliLiter(s) Oral Mucosa every 12 hours  chlorhexidine 2% Cloths 1 Application(s) Topical daily  collagenase Ointment 1 Application(s) Topical daily  dextrose 5%. 1000 milliLiter(s) (50 mL/Hr) IV Continuous <Continuous>  dextrose 5%. 1000 milliLiter(s) (100 mL/Hr) IV Continuous <Continuous>  dextrose 50% Injectable 25 Gram(s) IV Push once  dextrose 50% Injectable 12.5 Gram(s) IV Push once  dextrose 50% Injectable 25 Gram(s) IV Push once  folic acid 1 milliGRAM(s) Oral daily  glucagon  Injectable 1 milliGRAM(s) IntraMuscular once  heparin   Injectable 5000 Unit(s) SubCutaneous every 12 hours  insulin glargine Injectable (LANTUS) 10 Unit(s) SubCutaneous every morning  insulin lispro (ADMELOG) corrective regimen sliding scale   SubCutaneous every 6 hours  lactobacillus acidophilus 1 Tablet(s) Oral two times a day  LORazepam     Tablet 1 milliGRAM(s) Oral every 4 hours  methocarbamol 500 milliGRAM(s) Oral two times a day  mineral oil/petrolatum Hydrophilic Ointment 1 Application(s) Topical daily  multivitamin 1 Tablet(s) Oral daily  pantoprazole  Injectable 40 milliGRAM(s) IV Push daily  polyethylene glycol 3350 17 Gram(s) Oral two times a day  povidone iodine 10% Ointment 1 Application(s) Topical every 12 hours  senna 2 Tablet(s) Oral at bedtime  simethicone 80 milliGRAM(s) Chew every 6 hours  sucralfate 1 Gram(s) Oral four times a day    MEDICATIONS  (PRN):  acetaminophen     Tablet .. 650 milliGRAM(s) Oral every 6 hours PRN Temp greater or equal to 38C (100.4F)  ALBUTerol    90 MICROgram(s) HFA Inhaler 2 Puff(s) Inhalation every 6 hours PRN Shortness of Breath and/or Wheezing  dextrose Oral Gel 15 Gram(s) Oral once PRN Blood Glucose LESS THAN 70 milliGRAM(s)/deciliter  LORazepam   Injectable 1 milliGRAM(s) IV Push every 6 hours PRN Anxiety  sodium chloride 0.9% lock flush 10 milliLiter(s) IV Push every 1 hour PRN Pre/post blood products, medications, blood draw, and to maintain line patency      Diet, NPO with Tube Feed:   Tube Feeding Modality: Gastrostomy  Glucerna 1.5 Horacio  Total Volume for 24 Hours (mL): 1200  Continuous  Starting Tube Feed Rate mL per Hour: 20  Increase Tube Feed Rate by (mL): 10     Every 6 hours  Until Goal Tube Feed Rate (mL per Hour): 60  Tube Feed Duration (in Hours): 20  Tube Feed Start Time: 00:00  Free Water Flush  Pump   Rate (mL per Hour): 30   Frequency: Every Hour    Duration (Hours): 24 (04-22-22 @ 12:49) [Active]          Vital Signs Last 24 Hrs  T(C): 36 (28 Apr 2022 08:26), Max: 36.9 (28 Apr 2022 00:00)  T(F): 96.8 (28 Apr 2022 08:26), Max: 98.4 (28 Apr 2022 00:00)  HR: 89 (28 Apr 2022 08:00) (83 - 106)  BP: 138/75 (28 Apr 2022 08:00) (109/61 - 171/81)  BP(mean): 94 (28 Apr 2022 08:00) (76 - 105)  RR: 21 (28 Apr 2022 08:00) (18 - 41)  SpO2: 100% (28 Apr 2022 08:00) (94% - 100%)      04-27-22 @ 07:01  -  04-28-22 @ 07:00  --------------------------------------------------------  IN: 1880 mL / OUT: 890 mL / NET: 990 mL    04-28-22 @ 07:01  -  04-28-22 @ 11:06  --------------------------------------------------------  IN: 250 mL / OUT: 0 mL / NET: 250 mL        Mode: AC/ CMV (Assist Control/ Continuous Mandatory Ventilation), RR (machine): 18, TV (machine): 400, FiO2: 40, PEEP: 5, ITime: 1, MAP: 12, PIP: 28      LABS:                        7.3    6.44  )-----------( 323      ( 28 Apr 2022 06:20 )             25.0     04-28    135  |  102  |  31<H>  ----------------------------<  186<H>  4.4   |  26  |  1.10    Ca    8.9      28 Apr 2022 06:20    TPro  6.7  /  Alb  1.8<L>  /  TBili  0.2  /  DBili  x   /  AST  12  /  ALT  16  /  AlkPhos  173<H>  04-28              WBC:  WBC Count: 6.44 K/uL (04-28 @ 06:20)  WBC Count: 9.20 K/uL (04-27 @ 08:50)  WBC Count: 10.48 K/uL (04-26 @ 06:39)  WBC Count: 6.81 K/uL (04-25 @ 05:58)      MICROBIOLOGY:  RECENT CULTURES:  04-24 .Sputum Sputum Pseudomonas aeruginosa (Carbapenem Resistant)  Serratia marcescens   Few Squamous epithelial cells per low power field  Moderate polymorphonuclear leukocytes per low power field  Moderate Gram Negative Rods per oil power field   Moderate Pseudomonas aeruginosa (Carbapenem Resistant)  Moderate Serratia marcescens  Normal Respiratory Elvira present    04-22 Skin Skin XXXX XXXX   Culture yields growth of greater than 3 colony types of  Call client services within 7 days if further workup is clinically  indicated.    04-21 Clean Catch Clean Catch (Midstream) Escherichia coli ESBL XXXX   >100,000 CFU/ml Escherichia coli ESBL    04-21 .Blood Blood-Peripheral XXXX XXXX   No Growth Final                    Sodium:  Sodium, Serum: 135 mmol/L (04-28 @ 06:20)  Sodium, Serum: 138 mmol/L (04-27 @ 06:31)  Sodium, Serum: 140 mmol/L (04-26 @ 06:39)  Sodium, Serum: 139 mmol/L (04-25 @ 05:58)      1.10 mg/dL 04-28 @ 06:20  1.15 mg/dL 04-27 @ 06:31  1.15 mg/dL 04-26 @ 06:39  1.12 mg/dL 04-25 @ 05:58      Hemoglobin:  Hemoglobin: 7.3 g/dL (04-28 @ 06:20)  Hemoglobin: 7.2 g/dL (04-27 @ 08:50)  Hemoglobin: 7.5 g/dL (04-26 @ 06:39)  Hemoglobin: 7.1 g/dL (04-25 @ 05:58)      Platelets: Platelet Count - Automated: 323 K/uL (04-28 @ 06:20)  Platelet Count - Automated: 287 K/uL (04-27 @ 08:50)  Platelet Count - Automated: 269 K/uL (04-26 @ 06:39)  Platelet Count - Automated: 220 K/uL (04-25 @ 05:58)      LIVER FUNCTIONS - ( 28 Apr 2022 06:20 )  Alb: 1.8 g/dL / Pro: 6.7 g/dL / ALK PHOS: 173 U/L / ALT: 16 U/L DA / AST: 12 U/L / GGT: x                 RADIOLOGY & ADDITIONAL STUDIES:      MICROBIOLOGY:  RECENT CULTURES:  04-24 .Sputum Sputum Pseudomonas aeruginosa (Carbapenem Resistant)  Serratia marcescens   Few Squamous epithelial cells per low power field  Moderate polymorphonuclear leukocytes per low power field  Moderate Gram Negative Rods per oil power field   Moderate Pseudomonas aeruginosa (Carbapenem Resistant)  Moderate Serratia marcescens  Normal Respiratory Elvira present    04-22 Skin Skin XXXX XXXX   Culture yields growth of greater than 3 colony types of  Call client services within 7 days if further workup is clinically  indicated.    04-21 Clean Catch Clean Catch (Midstream) Escherichia coli ESBL XXXX   >100,000 CFU/ml Escherichia coli ESBL    04-21 .Blood Blood-Peripheral XXXX XXXX   No Growth Final

## 2022-04-28 NOTE — PROGRESS NOTE ADULT - ASSESSMENT
CHAPINCITO GUIDRY 75 f [facility] 4/21/2022   DR ILIANA KEMP     REVIEW OF SYMPTOMS      Able to give (reliable) ROS  NO     PHYSICAL EXAM    HEENT Unremarkable  atraumatic   RESP Fair air entry EXP prolonged    Harsh breath sound Resp distres mild   CARDIAC S1 S2 No S3     NO JVD    ABDOMEN SOFT BS PRESENT NOT DISTENDED No hepatosplenomegaly   PEDAL EDEMA present No calf tenderness  NO rash       DOA/CC/PROBLEMS poa .  76 f   from [facility]  doa 4/21/2022  cc fever hypotension    PRESENTING PROBLEMS 4/21 4/23/2022   Sepsis   Lacticemia La 2.8   Anemia Hb 6   RYAN Cr 1.3     PMH-PSH .  Pneumonia  HTN, DM, CVA, dementia, chronic respiratory failure s/p trach, PEG, cardiac arrest      COVID/ICU/CODE STATUS.                       COVID  STATUS.           ICU STAY. none  GOC.      BEST PRACTICE ISSUES.                                                  HEAD OF BED ELEVATION. Yes  DVT PROPHYLAXIS.    4/21 hpsc   INFECTION PROPHYLAXIS.   4/22 chlorhexidine 2%   4/21/2022 chlorhexidine .12%    SQUIRES PROPHYLAXIS.  4/21 protonix    4/21 carafate                                                                                      DIET.  4/22 glucerna 1.5 1200 gt      VITALS/PO/IO/VENT/DRIPS.  4/28/2022 90 2120/70   4/28/2022 ac 18/400/5/.4      PROBLEM DATA/ASSESSMENT RECOMMENDATIONS (A/R).    HEMODYNAMICS.   Monitor bp Target MAP 65 (+)    RESP.   Monitor po Target po 90-95%      PMH/PSH PROBLEMS.  Management continued/modified as indicated    VENT MANAGEMENT.   HOB elevation  Target Pplat 30 (-)  Target PO 90-95%  Target pH 730 (+)  Daily spontaneous breathing trials   Daily sedation vacation         INFECTION SOURCE.   VAP   INFECTION A/R.   4/22 C diff indet   4/21 urine c 100k e coli ESBL  ID Dr BRITTANY Brantley/Dr Medellin on case aware  Is on zosyn      SHOCK.  4/21 norepi  4/22/2022 wened off nore     LACTICEMIA.  La 4/21-4/22/2022 la 2.8 - 1.2     COPD.  4/21 albuterol  4/21 spiriva   under control    ANEMIA.   Hb 4/21-4/22-4/23-4/24-4/25-4/26-4/27-4/28/2022 hb 6 - 8.4 -7.7 - 7.5 - 7.1 - 7.5 - 7.2- 7.3    Monitro serial    TRANSFUSION  4/21 1 u prbc         HYPONATREMIA.  Na 4/21-4/22-4/23-4/24-4/26/2022 na 134- 133-131-133 - 140   4/23/2022 nacl added     RYAN.  Cr 4/21-4/22-4/24/2022 Cr 1.3 - 1.1-1.1      AMS.  ct 4/21 ct head (-)    ANXIETY.  4/23/2022 lorazepam 1.4p  4/21 lorazepam 1.6     SPASMS   4/21 methocarbamol 500.2       TIME SPENT   Over 36 minutes aggregate critical care time spent on encounter; activities included   direct patient care, counseling and/or coordinating care reviewing notes, lab data/ imaging , discussion with multidisciplinary team/ patient  /family and explaining in detail risks, benefits, alternatives  of the recommendations     CHAPINCITO GUIDRY 75 f [facility] 4/21/2022   DR ILIANA KEMP

## 2022-04-28 NOTE — PROVIDER CONTACT NOTE (CRITICAL VALUE NOTIFICATION) - PERSON GIVING RESULT:
[facility]
Alexandria carter
[facility]
[place]
Alexandria carter

## 2022-04-28 NOTE — PROGRESS NOTE ADULT - ASSESSMENT
The patient is a 76 year old female with a history of HTN, DM, CVA, dementia, chronic respiratory failure s/p trach, PEG, cardiac arrest, anemia who presented with respiratory distress, fevers, hypotension.    Plan:  - Echo 9/21 with normal LV systolic function, mod pulm HTN  - CT chest with bilateral infiltrates and pleural effusions  - Hemoglobin low but stable  - Completed zosyn  - Mechanical ventilation  - Overall prognosis remains poor

## 2022-04-28 NOTE — PROGRESS NOTE ADULT - SUBJECTIVE AND OBJECTIVE BOX
INTERVAL HPI/OVERNIGHT EVENTS:  No new overnight event.  No N/V/D.  Tolerating diet via peg    Allergies    codeine (Hives)    Intolerances    General:  No wt loss, fevers, chills, night sweats, fatigue,   Eyes:  Good vision, no reported pain  ENT:  No sore throat, pain, runny nose, dysphagia  CV:  No pain, palpitations, hypo/hypertension  Resp:  No dyspnea, cough, tachypnea, wheezing  GI:  No pain, No nausea, No vomiting, No diarrhea, No constipation, No weight loss, No fever, No pruritis, No rectal bleeding, No tarry stools, No dysphagia,  :  No pain, bleeding, incontinence, nocturia  Muscle:  No pain, weakness  Neuro:  No weakness, tingling, memory problems  Psych:  No fatigue, insomnia, mood problems, depression  Endocrine:  No polyuria, polydipsia, cold/heat intolerance  Heme:  No petechiae, ecchymosis, easy bruisability  Skin:  No rash, tattoos, scars, edema      PHYSICAL EXAM:   Vital Signs:  Vital Signs Last 24 Hrs  T(C): 36.6 (2022 06:00), Max: 36.9 (2022 00:00)  T(F): 97.8 (2022 06:00), Max: 98.4 (2022 00:00)  HR: 86 (2022 07:50) (81 - 106)  BP: 171/81 (2022 06:00) (92/56 - 171/81)  BP(mean): 105 (2022 06:00) (68 - 105)  RR: 33 (2022 06:00) (18 - 41)  SpO2: 100% (2022 07:50) (94% - 100%)  Daily     Daily Weight in k.7 (2022 06:00)I&O's Summary    2022 07:01  -  2022 07:00  --------------------------------------------------------  IN: 1880 mL / OUT: 890 mL / NET: 990 mL        GENERAL:  Appears stated age, well-groomed, well-nourished, no distress  HEENT:  NC/AT,  conjunctivae clear and pink, no thyromegaly, nodules, adenopathy, no JVD, sclera -anicteric  CHEST:  Full & symmetric excursion, no increased effort, breath sounds clear  HEART:  Regular rhythm, S1, S2, no murmur/rub/S3/S4, no abdominal bruit, no edema  ABDOMEN:  Soft, non-tender, non-distended, normoactive bowel sounds,  no masses ,no hepato-splenomegaly, no signs of chronic liver disease  EXTEREMITIES:  no cyanosis,clubbing or edema  SKIN:  No rash/erythema/ecchymoses/petechiae/wounds/abscess/warm/dry  NEURO:  Alert, oriented, no asterixis, no tremor, no encephalopathy      LABS:                        7.3    6.44  )-----------( 323      ( 2022 06:20 )             25.0     -    135  |  102  |  31<H>  ----------------------------<  186<H>  4.4   |  26  |  1.10    Ca    8.9      2022 06:20    TPro  6.7  /  Alb  1.8<L>  /  TBili  0.2  /  DBili  x   /  AST  12  /  ALT  16  /  AlkPhos  173<H>          amylase   lipase  RADIOLOGY & ADDITIONAL TESTS:

## 2022-04-28 NOTE — PROGRESS NOTE ADULT - ASSESSMENT
77 y/o F with PMH of HTN, DM type 2, CVA, recent Cardiac Arrest, Chronic Respiratory Failure, Vent Dependant s/p trach & PEG, Anemia with GI blood loss, and Dementia who was sent from [facility] facility for acute respiratory distress, fevers, hypotension.    Assessment:  1. Acute on chronic respiratory failure  2. Septic Shock, resolved   3. RYAN  5. Anemia   6. Pleural effusion.   7. ESBL UTI  8. Pseudomonal and serratia pneumonia     Plan:  Neuro: Mentation at baseline. CT head negative for acute pathology. Ativan PRN   CV: Monitoring end points of perfusion. Maintain MAP >65  Resp: s/p Trach. On AC/VC, actively titrating FiO2 to maintain O2 sat >92%. . Vent bundle in place. Aspiration precautions.  GI: Tolerating tube feeds.  PPI daily.   Renal: RYAN resolved.   - Lactic acidosis, s/p IVFs.  Trend lactate until cleared   ID: On Zosyn for ESBL UTI and  pneumonia.  Blood  cultures w/ no growth to date . Sputum culture w/ serratia and pseudmonas.   Heme: Anemia, s/p multiple PRBC. No evidence of bleeding, likely chronic anemia. Trend H/H. Fecal occult negative. Will transfuse for Hg <7.

## 2022-04-28 NOTE — PROGRESS NOTE ADULT - ASSESSMENT
77 y/o F with PMH of HTN, DM type 2, CVA, recent Cardiac Arrest, Chronic Respiratory Failure, Vent Dependant s/p trach & PEG, Anemia with GI blood loss, and Dementia who was sent from [facility] facility for acute respiratory distress, fevers, hypotension. Patient unable to contribute to hx due to mental status.  In the ED: wbc 14.15, HGB 6.0, INR 1.40, sodium 134, cr 1.36, lactate 2.8. UA sig for mod LE, large blood, wbc, bacteria. Given Zosyn and ns.     RECOMMENDATIONS  Cx from 4/21 reviewed:  sputum cx w/ pseudomonas and serratia  urine cx w/ ESBL E Coli but with low PRATIK to zosyn and zosyn concentrates in the urine  leukocytosis resolved; off pressors  vent settings improved from admission  f/u blood cx-NGTD  s/p 7 day course of zosyn  discharge planning    Diarrhea  C. diff indeterminate  with resolution of leukocytosis with antibiotics unlikely that patient has C Diff  monitor stool output/consistency    Emil Medellin M.D.  [facility], Division of Infectious Diseases  [phone]  After 5pm on weekdays and all day on weekends - please call [phone]        75 y/o F with PMH of HTN, DM type 2, CVA, recent Cardiac Arrest, Chronic Respiratory Failure, Vent Dependant s/p trach & PEG, Anemia with GI blood loss, and Dementia who was sent from [facility] facility for acute respiratory distress, fevers, hypotension. Patient unable to contribute to hx due to mental status.  In the ED: wbc 14.15, HGB 6.0, INR 1.40, sodium 134, cr 1.36, lactate 2.8. UA sig for mod LE, large blood, wbc, bacteria. Given Zosyn and ns.     RECOMMENDATIONS  Cx from 4/21 reviewed:  sputum cx w/ pseudomonas and serratia  urine cx w/ ESBL E Coli but with low PRATIK to zosyn and zosyn concentrates in the urine  leukocytosis resolved; off pressors  vent settings improved from admission  f/u blood cx-NGTD  s/p 7 day course of zosyn  frequency suctioning  discharge planning    Diarrhea  C. diff indeterminate  with resolution of leukocytosis with antibiotics unlikely that patient has C Diff  monitor stool output/consistency    Emil Medellin M.D.  [facility], Division of Infectious Diseases  [phone]  After 5pm on weekdays and all day on weekends - please call [phone]

## 2022-04-28 NOTE — PROGRESS NOTE ADULT - SUBJECTIVE AND OBJECTIVE BOX
[facility], Division of Infectious Diseases  MOIZ Lora Y. Patel, S. Shah, G. Casimir  [phone]  ([phone] - weekdays after 5pm and weekends)    Name: BREA BECKHAM  Age/Gender: 76y Female  MRN: [id number]    Interval History:  Patient seen this morning.   Notes reviewed.   Afebrile.   completed antibiotic course yesterday    Allergies: codeine (Hives)      Objective:  Vitals:   T(F): 96.8 (04-28-22 @ 08:26), Max: 98.4 (04-28-22 @ 00:00)  HR: 89 (04-28-22 @ 08:00) (81 - 106)  BP: 138/75 (04-28-22 @ 08:00) (92/56 - 171/81)  RR: 21 (04-28-22 @ 08:00) (18 - 41)  SpO2: 100% (04-28-22 @ 08:00) (94% - 100%)  Physical Examination:  General: no acute distress, awake  HEENT: anicteric  Cardio: S1, S2 present, normal rate  Resp: tracheostomy, on vent  Abd: soft, distended, PEG tube  Ext: b/l LE edema  Skin: warm, dry  Lines:    Laboratory Studies:  CBC:                       7.3    6.44  )-----------( 323      ( 28 Apr 2022 06:20 )             25.0     WBC Trend:  6.44 04-28-22 @ 06:20  9.20 04-27-22 @ 08:50  10.48 04-26-22 @ 06:39  6.81 04-25-22 @ 05:58  9.33 04-24-22 @ 05:50  11.35 04-23-22 @ 06:07  15.47 04-22-22 @ 06:58  14.93 04-21-22 @ 22:35  14.15 04-21-22 @ 13:04    CMP: 04-28    135  |  102  |  31<H>  ----------------------------<  186<H>  4.4   |  26  |  1.10    Ca    8.9      28 Apr 2022 06:20    TPro  6.7  /  Alb  1.8<L>  /  TBili  0.2  /  DBili  x   /  AST  12  /  ALT  16  /  AlkPhos  173<H>  04-28      LIVER FUNCTIONS - ( 28 Apr 2022 06:20 )  Alb: 1.8 g/dL / Pro: 6.7 g/dL / ALK PHOS: 173 U/L / ALT: 16 U/L DA / AST: 12 U/L / GGT: x               Microbiology: reviewed     Culture - Sputum (collected 04-24-22 @ 12:35)  Source: .Sputum Sputum  Gram Stain (04-24-22 @ 15:10):    Few Squamous epithelial cells per low power field    Moderate polymorphonuclear leukocytes per low power field    Moderate Gram Negative Rods per oil power field  Final Report (04-27-22 @ 07:34):    Moderate Pseudomonas aeruginosa (Carbapenem Resistant)    Moderate Serratia marcescens    Normal Respiratory Elvira present  Organism: Pseudomonas aeruginosa (Carbapenem Resistant)  Serratia marcescens (04-27-22 @ 07:34)  Organism: Serratia marcescens (04-27-22 @ 07:34)      -  Amikacin: S <=16      -  Amoxicillin/Clavulanic Acid: R >16/8      -  Ampicillin: R >16 These ampicillin results predict results for amoxicillin      -  Ampicillin/Sulbactam: R >16/8 Enterobacter, Klebsiella aerogenes, Citrobacter, and Serratia may develop resistance during prolonged therapy (3-4 days)      -  Aztreonam: R >16      -  Cefazolin: R >16 Enterobacter, Klebsiella aerogenes, Citrobacter, and Serratia may develop resistance during prolonged therapy (3-4 days)      -  Cefepime: S 8      -  Cefoxitin: R >16      -  Ceftriaxone: R >32 Enterobacter, Klebsiella aerogenes, Citrobacter, and Serratia may develop resistance during prolonged therapy      -  Ciprofloxacin: R 2      -  Ertapenem: S <=0.5      -  Gentamicin: S <=2      -  Levofloxacin: R 2      -  Meropenem: S <=1      -  Piperacillin/Tazobactam: S 16      -  Tobramycin: S 4      -  Trimethoprim/Sulfamethoxazole: S <=0.5/9.5      Method Type: PRATIK  Organism: Pseudomonas aeruginosa (Carbapenem Resistant) (04-27-22 @ 07:34)      -  Amikacin: S <=16      -  Aztreonam: S <=4      -  Cefepime: S 8      -  Ceftazidime: S 8      -  Ciprofloxacin: S <=0.25      -  Gentamicin: S <=2      -  Imipenem: R >8      -  Levofloxacin: S <=0.5      -  Meropenem: R 8      -  Piperacillin/Tazobactam: S 16      -  Tobramycin: S <=2      Method Type: PRATIK    Culture - Other (collected 04-22-22 @ 20:58)  Source: Skin Skin  Final Report (04-25-22 @ 21:04):    Culture yields growth of greater than 3 colony types of    Call client services within 7 days if further workup is clinically    indicated.    Culture - Urine (collected 04-21-22 @ 18:36)  Source: Clean Catch Clean Catch (Midstream)  Final Report (04-24-22 @ 08:46):    >100,000 CFU/ml Escherichia coli ESBL  Organism: Escherichia coli ESBL (04-24-22 @ 08:46)  Organism: Escherichia coli ESBL (04-24-22 @ 08:46)      -  Amikacin: S <=16      -  Amoxicillin/Clavulanic Acid: I 16/8      -  Ampicillin: R >16 These ampicillin results predict results for amoxicillin      -  Ampicillin/Sulbactam: R >16/8 Enterobacter, Klebsiella aerogenes, Citrobacter, and Serratia may develop resistance during prolonged therapy (3-4 days)      -  Aztreonam: R >16      -  Cefazolin: R >16 ([email or web address]) For uncomplicated UTI with K. pneumoniae, E. coli, or P. mirablis: PRATIK <=16 is sensitive and PRATIK >=32 is resistant. This also predicts results for oral agents cefaclor, cefdinir, cefpodoxime, cefprozil, cefuroxime axetil, cephalexin and locarbef for uncomplicated UTI. Note that some isolates may be susceptible to these agents while testing resistant to cefazolin.      -  Cefepime: R >16      -  Ceftriaxone: R >32 Enterobacter, Klebsiella aerogenes, Citrobacter, and Serratia may develop resistance during prolonged therapy      -  Ciprofloxacin: R >2      -  Ertapenem: S <=0.5      -  Gentamicin: R >8      -  Imipenem: S <=1      -  Levofloxacin: R 4      -  Meropenem: S <=1      -  Nitrofurantoin: S <=32 Should not be used to treat pyelonephritis      -  Piperacillin/Tazobactam: S <=8      -  Tigecycline: S <=2      -  Tobramycin: R >8      -  Trimethoprim/Sulfamethoxazole: R >2/38      Method Type: PRATIK    Culture - Blood (collected 04-21-22 @ 18:24)  Source: .Blood Blood-Peripheral  Final Report (04-26-22 @ 19:00):    No Growth Final    Culture - Blood (collected 04-21-22 @ 18:24)  Source: .Blood Blood-Peripheral  Final Report (04-26-22 @ 19:00):    No Growth Final      Radiology: reviewed     Medications:  acetaminophen     Tablet .. 650 milliGRAM(s) Oral every 6 hours PRN  ALBUTerol    90 MICROgram(s) HFA Inhaler 2 Puff(s) Inhalation every 6 hours PRN  chlorhexidine 0.12% Liquid 15 milliLiter(s) Oral Mucosa every 12 hours  chlorhexidine 2% Cloths 1 Application(s) Topical daily  collagenase Ointment 1 Application(s) Topical daily  dextrose 5%. 1000 milliLiter(s) IV Continuous <Continuous>  dextrose 5%. 1000 milliLiter(s) IV Continuous <Continuous>  dextrose 50% Injectable 25 Gram(s) IV Push once  dextrose 50% Injectable 12.5 Gram(s) IV Push once  dextrose 50% Injectable 25 Gram(s) IV Push once  dextrose Oral Gel 15 Gram(s) Oral once PRN  folic acid 1 milliGRAM(s) Oral daily  glucagon  Injectable 1 milliGRAM(s) IntraMuscular once  heparin   Injectable 5000 Unit(s) SubCutaneous every 12 hours  insulin glargine Injectable (LANTUS) 10 Unit(s) SubCutaneous every morning  insulin lispro (ADMELOG) corrective regimen sliding scale   SubCutaneous every 6 hours  lactobacillus acidophilus 1 Tablet(s) Oral two times a day  LORazepam     Tablet 1 milliGRAM(s) Oral every 4 hours  LORazepam   Injectable 1 milliGRAM(s) IV Push every 6 hours PRN  methocarbamol 500 milliGRAM(s) Oral two times a day  mineral oil/petrolatum Hydrophilic Ointment 1 Application(s) Topical daily  multivitamin 1 Tablet(s) Oral daily  pantoprazole  Injectable 40 milliGRAM(s) IV Push daily  polyethylene glycol 3350 17 Gram(s) Oral two times a day  povidone iodine 10% Ointment 1 Application(s) Topical every 12 hours  senna 2 Tablet(s) Oral at bedtime  simethicone 80 milliGRAM(s) Chew every 6 hours  sodium chloride 0.9% lock flush 10 milliLiter(s) IV Push every 1 hour PRN  sucralfate 1 Gram(s) Oral four times a day    Antimicrobials:   [facility], Division of Infectious Diseases  MOIZ Lora Y. Patel, S. Shah, G. Casimir  [phone]  ([phone] - weekdays after 5pm and weekends)    Name: BREA BECKHAM  Age/Gender: 76y Female  MRN: [id number]    Interval History:  Patient seen this morning.   Notes reviewed.   Afebrile.   completed antibiotic course yesterday    Allergies: codeine (Hives)      Objective:  Vitals:   T(F): 96.8 (04-28-22 @ 08:26), Max: 98.4 (04-28-22 @ 00:00)  HR: 89 (04-28-22 @ 08:00) (81 - 106)  BP: 138/75 (04-28-22 @ 08:00) (92/56 - 171/81)  RR: 21 (04-28-22 @ 08:00) (18 - 41)  SpO2: 100% (04-28-22 @ 08:00) (94% - 100%)  Physical Examination:  General: no acute distress, awake  HEENT: anicteric  Cardio: S1, S2 present, normal rate  Resp: upper lung field rhonchi, tracheostomy, on vent  Abd: soft, distended, PEG tube  Ext: b/l LE edema  Skin: warm, dry  Lines:    Laboratory Studies:  CBC:                       7.3    6.44  )-----------( 323      ( 28 Apr 2022 06:20 )             25.0     WBC Trend:  6.44 04-28-22 @ 06:20  9.20 04-27-22 @ 08:50  10.48 04-26-22 @ 06:39  6.81 04-25-22 @ 05:58  9.33 04-24-22 @ 05:50  11.35 04-23-22 @ 06:07  15.47 04-22-22 @ 06:58  14.93 04-21-22 @ 22:35  14.15 04-21-22 @ 13:04    CMP: 04-28    135  |  102  |  31<H>  ----------------------------<  186<H>  4.4   |  26  |  1.10    Ca    8.9      28 Apr 2022 06:20    TPro  6.7  /  Alb  1.8<L>  /  TBili  0.2  /  DBili  x   /  AST  12  /  ALT  16  /  AlkPhos  173<H>  04-28      LIVER FUNCTIONS - ( 28 Apr 2022 06:20 )  Alb: 1.8 g/dL / Pro: 6.7 g/dL / ALK PHOS: 173 U/L / ALT: 16 U/L DA / AST: 12 U/L / GGT: x               Microbiology: reviewed     Culture - Sputum (collected 04-24-22 @ 12:35)  Source: .Sputum Sputum  Gram Stain (04-24-22 @ 15:10):    Few Squamous epithelial cells per low power field    Moderate polymorphonuclear leukocytes per low power field    Moderate Gram Negative Rods per oil power field  Final Report (04-27-22 @ 07:34):    Moderate Pseudomonas aeruginosa (Carbapenem Resistant)    Moderate Serratia marcescens    Normal Respiratory Elvira present  Organism: Pseudomonas aeruginosa (Carbapenem Resistant)  Serratia marcescens (04-27-22 @ 07:34)  Organism: Serratia marcescens (04-27-22 @ 07:34)      -  Amikacin: S <=16      -  Amoxicillin/Clavulanic Acid: R >16/8      -  Ampicillin: R >16 These ampicillin results predict results for amoxicillin      -  Ampicillin/Sulbactam: R >16/8 Enterobacter, Klebsiella aerogenes, Citrobacter, and Serratia may develop resistance during prolonged therapy (3-4 days)      -  Aztreonam: R >16      -  Cefazolin: R >16 Enterobacter, Klebsiella aerogenes, Citrobacter, and Serratia may develop resistance during prolonged therapy (3-4 days)      -  Cefepime: S 8      -  Cefoxitin: R >16      -  Ceftriaxone: R >32 Enterobacter, Klebsiella aerogenes, Citrobacter, and Serratia may develop resistance during prolonged therapy      -  Ciprofloxacin: R 2      -  Ertapenem: S <=0.5      -  Gentamicin: S <=2      -  Levofloxacin: R 2      -  Meropenem: S <=1      -  Piperacillin/Tazobactam: S 16      -  Tobramycin: S 4      -  Trimethoprim/Sulfamethoxazole: S <=0.5/9.5      Method Type: PRATIK  Organism: Pseudomonas aeruginosa (Carbapenem Resistant) (04-27-22 @ 07:34)      -  Amikacin: S <=16      -  Aztreonam: S <=4      -  Cefepime: S 8      -  Ceftazidime: S 8      -  Ciprofloxacin: S <=0.25      -  Gentamicin: S <=2      -  Imipenem: R >8      -  Levofloxacin: S <=0.5      -  Meropenem: R 8      -  Piperacillin/Tazobactam: S 16      -  Tobramycin: S <=2      Method Type: PRATIK    Culture - Other (collected 04-22-22 @ 20:58)  Source: Skin Skin  Final Report (04-25-22 @ 21:04):    Culture yields growth of greater than 3 colony types of    Call client services within 7 days if further workup is clinically    indicated.    Culture - Urine (collected 04-21-22 @ 18:36)  Source: Clean Catch Clean Catch (Midstream)  Final Report (04-24-22 @ 08:46):    >100,000 CFU/ml Escherichia coli ESBL  Organism: Escherichia coli ESBL (04-24-22 @ 08:46)  Organism: Escherichia coli ESBL (04-24-22 @ 08:46)      -  Amikacin: S <=16      -  Amoxicillin/Clavulanic Acid: I 16/8      -  Ampicillin: R >16 These ampicillin results predict results for amoxicillin      -  Ampicillin/Sulbactam: R >16/8 Enterobacter, Klebsiella aerogenes, Citrobacter, and Serratia may develop resistance during prolonged therapy (3-4 days)      -  Aztreonam: R >16      -  Cefazolin: R >16 (MIC_CL_COM_[email or web address]) For uncomplicated UTI with K. pneumoniae, E. coli, or P. mirablis: PRATIK <=16 is sensitive and PRATIK >=32 is resistant. This also predicts results for oral agents cefaclor, cefdinir, cefpodoxime, cefprozil, cefuroxime axetil, cephalexin and locarbef for uncomplicated UTI. Note that some isolates may be susceptible to these agents while testing resistant to cefazolin.      -  Cefepime: R >16      -  Ceftriaxone: R >32 Enterobacter, Klebsiella aerogenes, Citrobacter, and Serratia may develop resistance during prolonged therapy      -  Ciprofloxacin: R >2      -  Ertapenem: S <=0.5      -  Gentamicin: R >8      -  Imipenem: S <=1      -  Levofloxacin: R 4      -  Meropenem: S <=1      -  Nitrofurantoin: S <=32 Should not be used to treat pyelonephritis      -  Piperacillin/Tazobactam: S <=8      -  Tigecycline: S <=2      -  Tobramycin: R >8      -  Trimethoprim/Sulfamethoxazole: R >2/38      Method Type: PRATIK    Culture - Blood (collected 04-21-22 @ 18:24)  Source: .Blood Blood-Peripheral  Final Report (04-26-22 @ 19:00):    No Growth Final    Culture - Blood (collected 04-21-22 @ 18:24)  Source: .Blood Blood-Peripheral  Final Report (04-26-22 @ 19:00):    No Growth Final      Radiology: reviewed     Medications:  acetaminophen     Tablet .. 650 milliGRAM(s) Oral every 6 hours PRN  ALBUTerol    90 MICROgram(s) HFA Inhaler 2 Puff(s) Inhalation every 6 hours PRN  chlorhexidine 0.12% Liquid 15 milliLiter(s) Oral Mucosa every 12 hours  chlorhexidine 2% Cloths 1 Application(s) Topical daily  collagenase Ointment 1 Application(s) Topical daily  dextrose 5%. 1000 milliLiter(s) IV Continuous <Continuous>  dextrose 5%. 1000 milliLiter(s) IV Continuous <Continuous>  dextrose 50% Injectable 25 Gram(s) IV Push once  dextrose 50% Injectable 12.5 Gram(s) IV Push once  dextrose 50% Injectable 25 Gram(s) IV Push once  dextrose Oral Gel 15 Gram(s) Oral once PRN  folic acid 1 milliGRAM(s) Oral daily  glucagon  Injectable 1 milliGRAM(s) IntraMuscular once  heparin   Injectable 5000 Unit(s) SubCutaneous every 12 hours  insulin glargine Injectable (LANTUS) 10 Unit(s) SubCutaneous every morning  insulin lispro (ADMELOG) corrective regimen sliding scale   SubCutaneous every 6 hours  lactobacillus acidophilus 1 Tablet(s) Oral two times a day  LORazepam     Tablet 1 milliGRAM(s) Oral every 4 hours  LORazepam   Injectable 1 milliGRAM(s) IV Push every 6 hours PRN  methocarbamol 500 milliGRAM(s) Oral two times a day  mineral oil/petrolatum Hydrophilic Ointment 1 Application(s) Topical daily  multivitamin 1 Tablet(s) Oral daily  pantoprazole  Injectable 40 milliGRAM(s) IV Push daily  polyethylene glycol 3350 17 Gram(s) Oral two times a day  povidone iodine 10% Ointment 1 Application(s) Topical every 12 hours  senna 2 Tablet(s) Oral at bedtime  simethicone 80 milliGRAM(s) Chew every 6 hours  sodium chloride 0.9% lock flush 10 milliLiter(s) IV Push every 1 hour PRN  sucralfate 1 Gram(s) Oral four times a day    Antimicrobials:

## 2022-04-28 NOTE — PROGRESS NOTE ADULT - SUBJECTIVE AND OBJECTIVE BOX
Date/Time Patient Seen:  		  Referring MD:   Data Reviewed	       Patient is a 76y old  Female who presents with a chief complaint of Anemia (27 Apr 2022 20:24)      Subjective/HPI     PAST MEDICAL & SURGICAL HISTORY:  Dementia of frontal lobe type    Aphasic stroke    Diabetes mellitus    Respiratory failure    Hypertension    GERD (gastroesophageal reflux disease)    Constipation    Respiratory failure    CVA (cerebral vascular accident)    HTN (hypertension)    DM (diabetes mellitus)    Advanced dementia    COVID-19 virus detected    Quadriplegia    Pneumonia    Type II diabetes mellitus    Hx of appendectomy    Gastrostomy in place    Tracheostomy in place    Tracheostomy tube present    Feeding by G-tube          Medication list         MEDICATIONS  (STANDING):  chlorhexidine 0.12% Liquid 15 milliLiter(s) Oral Mucosa every 12 hours  chlorhexidine 2% Cloths 1 Application(s) Topical daily  collagenase Ointment 1 Application(s) Topical daily  dextrose 5%. 1000 milliLiter(s) (50 mL/Hr) IV Continuous <Continuous>  dextrose 5%. 1000 milliLiter(s) (100 mL/Hr) IV Continuous <Continuous>  dextrose 50% Injectable 25 Gram(s) IV Push once  dextrose 50% Injectable 12.5 Gram(s) IV Push once  dextrose 50% Injectable 25 Gram(s) IV Push once  folic acid 1 milliGRAM(s) Oral daily  glucagon  Injectable 1 milliGRAM(s) IntraMuscular once  heparin   Injectable 5000 Unit(s) SubCutaneous every 12 hours  insulin glargine Injectable (LANTUS) 10 Unit(s) SubCutaneous every morning  insulin lispro (ADMELOG) corrective regimen sliding scale   SubCutaneous every 6 hours  lactobacillus acidophilus 1 Tablet(s) Oral two times a day  LORazepam     Tablet 1 milliGRAM(s) Oral every 4 hours  methocarbamol 500 milliGRAM(s) Oral two times a day  mineral oil/petrolatum Hydrophilic Ointment 1 Application(s) Topical daily  multivitamin 1 Tablet(s) Oral daily  pantoprazole  Injectable 40 milliGRAM(s) IV Push daily  piperacillin/tazobactam IVPB.. 3.375 Gram(s) IV Intermittent every 8 hours  polyethylene glycol 3350 17 Gram(s) Oral two times a day  povidone iodine 10% Ointment 1 Application(s) Topical every 12 hours  senna 2 Tablet(s) Oral at bedtime  simethicone 80 milliGRAM(s) Chew every 6 hours  sucralfate 1 Gram(s) Oral four times a day    MEDICATIONS  (PRN):  acetaminophen     Tablet .. 650 milliGRAM(s) Oral every 6 hours PRN Temp greater or equal to 38C (100.4F)  ALBUTerol    90 MICROgram(s) HFA Inhaler 2 Puff(s) Inhalation every 6 hours PRN Shortness of Breath and/or Wheezing  dextrose Oral Gel 15 Gram(s) Oral once PRN Blood Glucose LESS THAN 70 milliGRAM(s)/deciliter  LORazepam   Injectable 1 milliGRAM(s) IV Push every 6 hours PRN Anxiety  sodium chloride 0.9% lock flush 10 milliLiter(s) IV Push every 1 hour PRN Pre/post blood products, medications, blood draw, and to maintain line patency         Vitals log        ICU Vital Signs Last 24 Hrs  T(C): 36.6 (27 Apr 2022 20:21), Max: 36.7 (27 Apr 2022 12:55)  T(F): 97.8 (27 Apr 2022 20:21), Max: 98 (27 Apr 2022 12:55)  HR: 85 (28 Apr 2022 05:40) (81 - 98)  BP: 131/68 (27 Apr 2022 21:00) (92/56 - 141/80)  BP(mean): 87 (27 Apr 2022 21:00) (68 - 102)  ABP: --  ABP(mean): --  RR: 21 (27 Apr 2022 21:00) (18 - 41)  SpO2: 98% (28 Apr 2022 05:40) (97% - 100%)       Mode: AC/ CMV (Assist Control/ Continuous Mandatory Ventilation)  RR (machine): 18  TV (machine): 400  FiO2: 50  PEEP: 5  ITime: 1  MAP: 18  PIP: 34      Input and Output:  I&O's Detail    26 Apr 2022 07:01  -  27 Apr 2022 07:00  --------------------------------------------------------  IN:    Enteral Tube Flush: 385 mL    Glucerna 1.5: 720 mL  Total IN: 1105 mL    OUT:    Indwelling Catheter - Urethral (mL): 1100 mL  Total OUT: 1100 mL    Total NET: 5 mL      27 Apr 2022 07:01  -  28 Apr 2022 06:40  --------------------------------------------------------  IN:    Enteral Tube Flush: 180 mL    Glucerna 1.5: 360 mL  Total IN: 540 mL    OUT:    Indwelling Catheter - Urethral (mL): 240 mL  Total OUT: 240 mL    Total NET: 300 mL          Lab Data                        7.3    6.44  )-----------( 323      ( 28 Apr 2022 06:20 )             25.0     04-27    138  |  105  |  32<H>  ----------------------------<  209<H>  4.5   |  26  |  1.15    Ca    8.9      27 Apr 2022 06:31    TPro  6.8  /  Alb  1.7<L>  /  TBili  0.2  /  DBili  x   /  AST  15  /  ALT  18  /  AlkPhos  183<H>  04-27            Review of Systems	      Objective     Physical Examination    heart s1s2  lung dec bS  abd soft      Pertinent Lab findings & Imaging      Annalise:  NO   Adequate UO     I&O's Detail    26 Apr 2022 07:01  -  27 Apr 2022 07:00  --------------------------------------------------------  IN:    Enteral Tube Flush: 385 mL    Glucerna 1.5: 720 mL  Total IN: 1105 mL    OUT:    Indwelling Catheter - Urethral (mL): 1100 mL  Total OUT: 1100 mL    Total NET: 5 mL      27 Apr 2022 07:01  -  28 Apr 2022 06:40  --------------------------------------------------------  IN:    Enteral Tube Flush: 180 mL    Glucerna 1.5: 360 mL  Total IN: 540 mL    OUT:    Indwelling Catheter - Urethral (mL): 240 mL  Total OUT: 240 mL    Total NET: 300 mL               Discussed with:     Cultures:	        Radiology

## 2022-04-28 NOTE — PROGRESS NOTE ADULT - SUBJECTIVE AND OBJECTIVE BOX
Neurology follow up note    BREA [id number]      Interval History:    Patient resting in bed     Allergies    codeine (Hives)    Intolerances        MEDICATIONS    acetaminophen     Tablet .. 650 milliGRAM(s) Oral every 6 hours PRN  ALBUTerol    90 MICROgram(s) HFA Inhaler 2 Puff(s) Inhalation every 6 hours PRN  chlorhexidine 0.12% Liquid 15 milliLiter(s) Oral Mucosa every 12 hours  chlorhexidine 2% Cloths 1 Application(s) Topical daily  collagenase Ointment 1 Application(s) Topical daily  dextrose 5%. 1000 milliLiter(s) IV Continuous <Continuous>  dextrose 5%. 1000 milliLiter(s) IV Continuous <Continuous>  dextrose 50% Injectable 25 Gram(s) IV Push once  dextrose 50% Injectable 12.5 Gram(s) IV Push once  dextrose 50% Injectable 25 Gram(s) IV Push once  dextrose Oral Gel 15 Gram(s) Oral once PRN  folic acid 1 milliGRAM(s) Oral daily  glucagon  Injectable 1 milliGRAM(s) IntraMuscular once  heparin   Injectable 5000 Unit(s) SubCutaneous every 12 hours  insulin glargine Injectable (LANTUS) 10 Unit(s) SubCutaneous every morning  insulin lispro (ADMELOG) corrective regimen sliding scale   SubCutaneous every 6 hours  lactobacillus acidophilus 1 Tablet(s) Oral two times a day  LORazepam     Tablet 1 milliGRAM(s) Oral every 4 hours  LORazepam   Injectable 1 milliGRAM(s) IV Push every 6 hours PRN  methocarbamol 500 milliGRAM(s) Oral two times a day  mineral oil/petrolatum Hydrophilic Ointment 1 Application(s) Topical daily  multivitamin 1 Tablet(s) Oral daily  pantoprazole  Injectable 40 milliGRAM(s) IV Push daily  polyethylene glycol 3350 17 Gram(s) Oral two times a day  povidone iodine 10% Ointment 1 Application(s) Topical every 12 hours  senna 2 Tablet(s) Oral at bedtime  simethicone 80 milliGRAM(s) Chew every 6 hours  sodium chloride 0.9% lock flush 10 milliLiter(s) IV Push every 1 hour PRN  sucralfate 1 Gram(s) Oral four times a day              Vital Signs Last 24 Hrs  T(C): 36 (28 Apr 2022 08:26), Max: 36.9 (28 Apr 2022 00:00)  T(F): 96.8 (28 Apr 2022 08:26), Max: 98.4 (28 Apr 2022 00:00)  HR: 89 (28 Apr 2022 08:00) (82 - 106)  BP: 138/75 (28 Apr 2022 08:00) (92/56 - 171/81)  BP(mean): 94 (28 Apr 2022 08:00) (68 - 105)  RR: 21 (28 Apr 2022 08:00) (18 - 41)  SpO2: 100% (28 Apr 2022 08:00) (94% - 100%)      REVIEW OF SYSTEMS:  Cannot be obtained secondary to the patient being nonverbal.    PHYSICAL EXAMINATION:    HEENT:  Head:  Normocephalic.  Eyes:  No scleral icterus.  Ears:  Unable to evaluate with the patient being nonverbal and unresponsive.  NECK:  Tracheostomy was in place.  RESPIRATORY: Decreased breath sounds bilaterally.  ABDOMEN: Soft and nontender.  EXTREMITIES:  No clubbing or cyanosis was noted.      NEUROLOGIC:  No response to verbal stimuli.  I opened the patient's eyes, no blink to visual threat.  To painful stimuli, slight withdrawal bilateral upper and lower.  Motor:  Bilateral upper extremity was in flexed position.  Bilateral lower extremities were straight.  Upon stimulation, the patient would have subtle shaking.                      LABS:  CBC Full  -  ( 28 Apr 2022 06:20 )  WBC Count : 6.44 K/uL  RBC Count : 3.03 M/uL  Hemoglobin : 7.3 g/dL  Hematocrit : 25.0 %  Platelet Count - Automated : 323 K/uL  Mean Cell Volume : 82.5 fl  Mean Cell Hemoglobin : 24.1 pg  Mean Cell Hemoglobin Concentration : 29.2 gm/dL  Auto Neutrophil # : 4.22 K/uL  Auto Lymphocyte # : 1.21 K/uL  Auto Monocyte # : 0.72 K/uL  Auto Eosinophil # : 0.17 K/uL  Auto Basophil # : 0.02 K/uL  Auto Neutrophil % : 65.5 %  Auto Lymphocyte % : 18.8 %  Auto Monocyte % : 11.2 %  Auto Eosinophil % : 2.6 %  Auto Basophil % : 0.3 %      04-28    135  |  102  |  31<H>  ----------------------------<  186<H>  4.4   |  26  |  1.10    Ca    8.9      28 Apr 2022 06:20    TPro  6.7  /  Alb  1.8<L>  /  TBili  0.2  /  DBili  x   /  AST  12  /  ALT  16  /  AlkPhos  173<H>  04-28    Hemoglobin A1C:     LIVER FUNCTIONS - ( 28 Apr 2022 06:20 )  Alb: 1.8 g/dL / Pro: 6.7 g/dL / ALK PHOS: 173 U/L / ALT: 16 U/L DA / AST: 12 U/L / GGT: x           Vitamin B12         RADIOLOGY      ANALYSIS AND PLAN:  This is a 76-year-old with altered mental status and history of abnormal movements.  For altered mental status, most likely metabolic encephalopathy secondary to underlying pneumonia-type process along with signs of dehydration from SMA-7.  Antibiotics as needed.  Monitor renal function.  For abnormal movements, the patient has been evaluated multiple times in the past, as per my conversation with spouse, these were deemed nonepileptiform, at present we will refrain from any antiseizure medication and had multiple conversations in the past with spouse, he does not want any.  For history of spasms, continue the patient on muscle relaxants.  For history of diabetes, strict control of blood sugars.  If possible, please maintain a systolic blood pressure above 100 to help maintain cerebral perfusion.    Greater than 22 minutes of time spent with the patient, planning care, reviewing data, and speaking to multidisciplinary healthcare team with greater than 50% of that time in counseling and care coordination.

## 2022-04-28 NOTE — PROGRESS NOTE ADULT - ASSESSMENT
76F Chronic Respiratory Failure, HTN, DM2, Dementia, admitted for Septic Shock.     Acute on Chronic Hypoxic Respiratory Failure /  Septic Shock   Imaging shows inflitrates and pleural effusions; UC showing ESBL UTI; C. Diff studies are indeterminate  Off Pressors and hemodynamics improved; On Vent via Trach   Arterial Line removed  Discussed with ID, completed course of abx    Acute Blood Loss Anemia   S/P 1U PRBC Transfusion and started on Folic Acid   Has Anemia of Chronic Disease   Hematology consult noted   Will give 1 unit PRBC transfusion prior to discharge     Acute Renal Failure  Improved and from sepsis   Monitor BMP and Electrolytes     Diabetes type II  Insulin Corrective Scale  Finger sticks per routine   Hypoglycemia protocol  Lantus Q am     Quadriplegia  c/w nursing care for all ADLs    COPD  c/w inhalers   pulm (Jaime), recs appreciated    GERD  c/w carafate and PPI     Disposition  Discharge to [facility] tomorrow if HD stable

## 2022-04-28 NOTE — PROGRESS NOTE ADULT - SUBJECTIVE AND OBJECTIVE BOX
Patient is a 76y old  Female who presents with a chief complaint of Anemia (29 Apr 2022 06:24)      75 y/o F with PMH of HTN, DM type 2, CVA, recent Cardiac Arrest, Chronic Respiratory Failure, Vent Dependant s/p trach & PEG, Anemia with GI blood loss, and Dementia who was sent from [facility] facility for acute respiratory distress, fevers, hypotension. Admitted w/ Septic Shock, acute on chronic respiratory failure, RYAN, lactic acidosis. Urine culture w/ ESBL E.coli. Sputum culture w/ pseudomonas and serratia      PAST MEDICAL & SURGICAL HISTORY:  Dementia of frontal lobe type    Aphasic stroke    Diabetes mellitus    Respiratory failure    Hypertension    GERD (gastroesophageal reflux disease)    Constipation    Respiratory failure    CVA (cerebral vascular accident)    HTN (hypertension)    DM (diabetes mellitus)    Advanced dementia    COVID-19 virus detected    Quadriplegia    Pneumonia    Type II diabetes mellitus    Hx of appendectomy    Gastrostomy in place    Tracheostomy in place    Tracheostomy tube present    Feeding by G-tube        Review of Systems:  Unable to obtain due to clinical condition       Medications:      ALBUTerol    90 MICROgram(s) HFA Inhaler 2 Puff(s) Inhalation every 6 hours PRN    acetaminophen     Tablet .. 650 milliGRAM(s) Oral every 6 hours PRN  LORazepam     Tablet 1 milliGRAM(s) Oral every 4 hours  LORazepam   Injectable 1 milliGRAM(s) IV Push every 6 hours PRN  methocarbamol 500 milliGRAM(s) Oral two times a day      heparin   Injectable 5000 Unit(s) SubCutaneous every 12 hours    pantoprazole  Injectable 40 milliGRAM(s) IV Push daily  polyethylene glycol 3350 17 Gram(s) Oral two times a day  senna 2 Tablet(s) Oral at bedtime  simethicone 80 milliGRAM(s) Chew every 6 hours  sucralfate 1 Gram(s) Oral four times a day      dextrose 50% Injectable 25 Gram(s) IV Push once  dextrose 50% Injectable 12.5 Gram(s) IV Push once  dextrose 50% Injectable 25 Gram(s) IV Push once  dextrose Oral Gel 15 Gram(s) Oral once PRN  glucagon  Injectable 1 milliGRAM(s) IntraMuscular once  insulin glargine Injectable (LANTUS) 10 Unit(s) SubCutaneous every morning  insulin lispro (ADMELOG) corrective regimen sliding scale   SubCutaneous every 6 hours    dextrose 5%. 1000 milliLiter(s) IV Continuous <Continuous>  dextrose 5%. 1000 milliLiter(s) IV Continuous <Continuous>  folic acid 1 milliGRAM(s) Oral daily  multivitamin 1 Tablet(s) Oral daily  sodium chloride 0.9% lock flush 10 milliLiter(s) IV Push every 1 hour PRN      chlorhexidine 0.12% Liquid 15 milliLiter(s) Oral Mucosa every 12 hours  chlorhexidine 2% Cloths 1 Application(s) Topical daily  collagenase Ointment 1 Application(s) Topical daily  mineral oil/petrolatum Hydrophilic Ointment 1 Application(s) Topical daily  povidone iodine 10% Ointment 1 Application(s) Topical every 12 hours    lactobacillus acidophilus 1 Tablet(s) Oral two times a day      Mode: AC/ CMV (Assist Control/ Continuous Mandatory Ventilation)  RR (machine): 18  TV (machine): 400  FiO2: 40  PEEP: 5  ITime: 1  MAP: 12  PIP: 29      ICU Vital Signs Last 24 Hrs  T(C): 36.8 (29 Apr 2022 00:28), Max: 36.8 (29 Apr 2022 00:28)  T(F): 98.3 (29 Apr 2022 00:28), Max: 98.3 (29 Apr 2022 00:28)  HR: 87 (29 Apr 2022 04:10) (81 - 116)  BP: 135/73 (28 Apr 2022 18:00) (123/70 - 157/78)  BP(mean): 92 (28 Apr 2022 18:00) (86 - 100)  ABP: --  ABP(mean): --  RR: 19 (28 Apr 2022 18:00) (18 - 28)  SpO2: 100% (29 Apr 2022 04:10) (93% - 100%)          I&O's Detail    27 Apr 2022 07:01  -  28 Apr 2022 07:00  --------------------------------------------------------  IN:    Enteral Tube Flush: 540 mL    Glucerna 1.5: 1140 mL    IV PiggyBack: 200 mL  Total IN: 1880 mL    OUT:    Indwelling Catheter - Urethral (mL): 240 mL    Rectal Tube (mL): 200 mL    Voided (mL): 450 mL  Total OUT: 890 mL    Total NET: 990 mL      28 Apr 2022 07:01  -  29 Apr 2022 06:37  --------------------------------------------------------  IN:    Enteral Tube Flush: 130 mL    Glucerna 1.5: 120 mL    PRBCs (Packed Red Blood Cells): 85 mL  Total IN: 335 mL    OUT:  Total OUT: 0 mL    Total NET: 335 mL            LABS:                        7.3    6.44  )-----------( 323      ( 28 Apr 2022 06:20 )             25.0     04-28    135  |  102  |  31<H>  ----------------------------<  186<H>  4.4   |  26  |  1.10    Ca    8.9      28 Apr 2022 06:20    TPro  6.7  /  Alb  1.8<L>  /  TBili  0.2  /  DBili  x   /  AST  12  /  ALT  16  /  AlkPhos  173<H>  04-28          CAPILLARY BLOOD GLUCOSE      POCT Blood Glucose.: 141 mg/dL (29 Apr 2022 06:22)        CULTURES:  Culture Results:   Moderate Pseudomonas aeruginosa (Carbapenem Resistant)  Moderate Serratia marcescens  Normal Respiratory Elvira present (04-24-22 @ 12:35)  Culture Results:   Culture yields growth of greater than 3 colony types of  Call client services within 7 days if further workup is clinically  indicated. (04-22-22 @ 20:58)  C Diff by PCR Result: Indeterminate (04-22-22 @ 12:07)      Physical Examination:    General: No acute distress. Contracted     HEENT: Pupils equal, reactive to light.  Symmetric.    PULM: Coarse breath sounds b/l. No wheeze     CVS: Regular rate and rhythm, no murmurs, rubs, or gallops    ABD: Soft, nondistended, nontender, normoactive bowel sounds. + PEG     EXT: No edema, nontender    SKIN: Warm and well perfused, no rashes noted.    NEURO: Contracted. Not following commands.      RADIOLOGY: < from: Xray Chest 1 View- PORTABLE-Urgent (Xray Chest 1 View- PORTABLE-Urgent .) (04.21.22 @ 18:50) >    ACC: [id number] EXAM:  XR CHEST PORTABLE URGENT 1V                          PROCEDURE DATE:  04/21/2022          INTERPRETATION:  Portable chest radiograph    CLINICAL INFORMATION: Line placement    TECHNIQUE:  Portable  AP chest radiograph.    COMPARISON: 1/6/2022 chest radiograph .    FINDINGS:  CATHETERS AND TUBES: LEFT IJ catheter tip in SVC.  Tracheostomy tube in place.      PULMONARY: Increased perihilar diffuse airspace disease and/or effusions   obscuring LEFT diaphragm contour..   No pneumothorax.    HEART/VASCULAR: The heart and mediastinum size and configuration are   within normal limits.    BONES: Visualized osseous structures are intact.    IMPRESSION:   LEFT IJ catheter tip in SVC.  Increased bilateral perihilar diffuse airspace disease and/or effusions   obscuring LEFT diaphragm contour..    --- End of Report ---    < end of copied text >

## 2022-04-28 NOTE — PROGRESS NOTE ADULT - SUBJECTIVE AND OBJECTIVE BOX
Patient is a 76y old  Female who presents with a chief complaint of Anemia (28 Apr 2022 09:58)      INTERVAL HPI/OVERNIGHT EVENTS: Patient seen and examined at bedside. No overnight events    MEDICATIONS  (STANDING):  chlorhexidine 0.12% Liquid 15 milliLiter(s) Oral Mucosa every 12 hours  chlorhexidine 2% Cloths 1 Application(s) Topical daily  collagenase Ointment 1 Application(s) Topical daily  dextrose 5%. 1000 milliLiter(s) (100 mL/Hr) IV Continuous <Continuous>  dextrose 5%. 1000 milliLiter(s) (50 mL/Hr) IV Continuous <Continuous>  dextrose 50% Injectable 25 Gram(s) IV Push once  dextrose 50% Injectable 12.5 Gram(s) IV Push once  dextrose 50% Injectable 25 Gram(s) IV Push once  folic acid 1 milliGRAM(s) Oral daily  glucagon  Injectable 1 milliGRAM(s) IntraMuscular once  heparin   Injectable 5000 Unit(s) SubCutaneous every 12 hours  insulin glargine Injectable (LANTUS) 10 Unit(s) SubCutaneous every morning  insulin lispro (ADMELOG) corrective regimen sliding scale   SubCutaneous every 6 hours  lactobacillus acidophilus 1 Tablet(s) Oral two times a day  LORazepam     Tablet 1 milliGRAM(s) Oral every 4 hours  methocarbamol 500 milliGRAM(s) Oral two times a day  mineral oil/petrolatum Hydrophilic Ointment 1 Application(s) Topical daily  multivitamin 1 Tablet(s) Oral daily  pantoprazole  Injectable 40 milliGRAM(s) IV Push daily  polyethylene glycol 3350 17 Gram(s) Oral two times a day  povidone iodine 10% Ointment 1 Application(s) Topical every 12 hours  senna 2 Tablet(s) Oral at bedtime  simethicone 80 milliGRAM(s) Chew every 6 hours  sucralfate 1 Gram(s) Oral four times a day    MEDICATIONS  (PRN):  acetaminophen     Tablet .. 650 milliGRAM(s) Oral every 6 hours PRN Temp greater or equal to 38C (100.4F)  ALBUTerol    90 MICROgram(s) HFA Inhaler 2 Puff(s) Inhalation every 6 hours PRN Shortness of Breath and/or Wheezing  dextrose Oral Gel 15 Gram(s) Oral once PRN Blood Glucose LESS THAN 70 milliGRAM(s)/deciliter  LORazepam   Injectable 1 milliGRAM(s) IV Push every 6 hours PRN Anxiety  sodium chloride 0.9% lock flush 10 milliLiter(s) IV Push every 1 hour PRN Pre/post blood products, medications, blood draw, and to maintain line patency      Allergies    codeine (Hives)    Intolerances        REVIEW OF SYSTEMS:  Unable to obtain meaningful ROS due to mental status      Vital Signs Last 24 Hrs  T(C): 36 (28 Apr 2022 08:26), Max: 36.9 (28 Apr 2022 00:00)  T(F): 96.8 (28 Apr 2022 08:26), Max: 98.4 (28 Apr 2022 00:00)  HR: 89 (28 Apr 2022 08:00) (83 - 106)  BP: 138/75 (28 Apr 2022 08:00) (109/61 - 171/81)  BP(mean): 94 (28 Apr 2022 08:00) (76 - 105)  RR: 21 (28 Apr 2022 08:00) (18 - 41)  SpO2: 100% (28 Apr 2022 08:00) (94% - 100%)    PHYSICAL EXAM:  GENERAL: chronically ill appearing, emaciated, NAD, obtunded  HEAD:  atraumatic  EYES: conjunctiva clear  NECK: (+)trach in place, clean  RESPIRATORY: mechanical breath sounds, vent in place, no gross crackles or rales  CARDIOVASCULAR:  regular rate and rhythm, no murmurs or rubs or gallops, 2+ peripheral pulses  GASTROINTESTINAL:  soft, +PEG in place, clean and dry as well, nondistended, bowel sounds present  EXTREMITIES:     no clubbing or cyanosis or edema  MUSCULOSKELETAL:  (+)diffuse contractures in all joints  NERVOUS SYSTEM: does not respond to pain    LABS:                        7.3    6.44  )-----------( 323      ( 28 Apr 2022 06:20 )             25.0     CBC Full  -  ( 28 Apr 2022 06:20 )  WBC Count : 6.44 K/uL  Hemoglobin : 7.3 g/dL  Hematocrit : 25.0 %  Platelet Count - Automated : 323 K/uL  Mean Cell Volume : 82.5 fl  Mean Cell Hemoglobin : 24.1 pg  Mean Cell Hemoglobin Concentration : 29.2 gm/dL  Auto Neutrophil # : 4.22 K/uL  Auto Lymphocyte # : 1.21 K/uL  Auto Monocyte # : 0.72 K/uL  Auto Eosinophil # : 0.17 K/uL  Auto Basophil # : 0.02 K/uL  Auto Neutrophil % : 65.5 %  Auto Lymphocyte % : 18.8 %  Auto Monocyte % : 11.2 %  Auto Eosinophil % : 2.6 %  Auto Basophil % : 0.3 %    28 Apr 2022 06:20    135    |  102    |  31     ----------------------------<  186    4.4     |  26     |  1.10     Ca    8.9        28 Apr 2022 06:20    TPro  6.7    /  Alb  1.8    /  TBili  0.2    /  DBili  x      /  AST  12     /  ALT  16     /  AlkPhos  173    28 Apr 2022 06:20        CAPILLARY BLOOD GLUCOSE      POCT Blood Glucose.: 178 mg/dL (28 Apr 2022 08:10)  POCT Blood Glucose.: 207 mg/dL (28 Apr 2022 05:40)  POCT Blood Glucose.: 159 mg/dL (27 Apr 2022 23:35)  POCT Blood Glucose.: 208 mg/dL (27 Apr 2022 16:29)  POCT Blood Glucose.: 190 mg/dL (27 Apr 2022 11:16)        Culture - Sputum (collected 04-24-22 @ 12:35)  Source: .Sputum Sputum  Gram Stain (04-24-22 @ 15:10):    Few Squamous epithelial cells per low power field    Moderate polymorphonuclear leukocytes per low power field    Moderate Gram Negative Rods per oil power field  Final Report (04-27-22 @ 07:34):    Moderate Pseudomonas aeruginosa (Carbapenem Resistant)    Moderate Serratia marcescens    Normal Respiratory Elvira present  Organism: Pseudomonas aeruginosa (Carbapenem Resistant)  Serratia marcescens (04-27-22 @ 07:34)  Organism: Serratia marcescens (04-27-22 @ 07:34)      -  Amikacin: S <=16      -  Amoxicillin/Clavulanic Acid: R >16/8      -  Ampicillin: R >16 These ampicillin results predict results for amoxicillin      -  Ampicillin/Sulbactam: R >16/8 Enterobacter, Klebsiella aerogenes, Citrobacter, and Serratia may develop resistance during prolonged therapy (3-4 days)      -  Aztreonam: R >16      -  Cefazolin: R >16 Enterobacter, Klebsiella aerogenes, Citrobacter, and Serratia may develop resistance during prolonged therapy (3-4 days)      -  Cefepime: S 8      -  Cefoxitin: R >16      -  Ceftriaxone: R >32 Enterobacter, Klebsiella aerogenes, Citrobacter, and Serratia may develop resistance during prolonged therapy      -  Ciprofloxacin: R 2      -  Ertapenem: S <=0.5      -  Gentamicin: S <=2      -  Levofloxacin: R 2      -  Meropenem: S <=1      -  Piperacillin/Tazobactam: S 16      -  Tobramycin: S 4      -  Trimethoprim/Sulfamethoxazole: S <=0.5/9.5      Method Type: PRATIK  Organism: Pseudomonas aeruginosa (Carbapenem Resistant) (04-27-22 @ 07:34)      -  Amikacin: S <=16      -  Aztreonam: S <=4      -  Cefepime: S 8      -  Ceftazidime: S 8      -  Ciprofloxacin: S <=0.25      -  Gentamicin: S <=2      -  Imipenem: R >8      -  Levofloxacin: S <=0.5      -  Meropenem: R 8      -  Piperacillin/Tazobactam: S 16      -  Tobramycin: S <=2      Method Type: PRATIK    Culture - Other (collected 04-22-22 @ 20:58)  Source: Skin Skin  Final Report (04-25-22 @ 21:04):    Culture yields growth of greater than 3 colony types of    Call client services within 7 days if further workup is clinically    indicated.    Culture - Urine (collected 04-21-22 @ 18:36)  Source: Clean Catch Clean Catch (Midstream)  Final Report (04-24-22 @ 08:46):    >100,000 CFU/ml Escherichia coli ESBL  Organism: Escherichia coli ESBL (04-24-22 @ 08:46)  Organism: Escherichia coli ESBL (04-24-22 @ 08:46)      -  Amikacin: S <=16      -  Amoxicillin/Clavulanic Acid: I 16/8      -  Ampicillin: R >16 These ampicillin results predict results for amoxicillin      -  Ampicillin/Sulbactam: R >16/8 Enterobacter, Klebsiella aerogenes, Citrobacter, and Serratia may develop resistance during prolonged therapy (3-4 days)      -  Aztreonam: R >16      -  Cefazolin: R >16 ([id number]) For uncomplicated UTI with K. pneumoniae, E. coli, or P. mirablis: PRATIK <=16 is sensitive and PRATIK >=32 is resistant. This also predicts results for oral agents cefaclor, cefdinir, cefpodoxime, cefprozil, cefuroxime axetil, cephalexin and locarbef for uncomplicated UTI. Note that some isolates may be susceptible to these agents while testing resistant to cefazolin.      -  Cefepime: R >16      -  Ceftriaxone: R >32 Enterobacter, Klebsiella aerogenes, Citrobacter, and Serratia may develop resistance during prolonged therapy      -  Ciprofloxacin: R >2      -  Ertapenem: S <=0.5      -  Gentamicin: R >8      -  Imipenem: S <=1      -  Levofloxacin: R 4      -  Meropenem: S <=1      -  Nitrofurantoin: S <=32 Should not be used to treat pyelonephritis      -  Piperacillin/Tazobactam: S <=8      -  Tigecycline: S <=2      -  Tobramycin: R >8      -  Trimethoprim/Sulfamethoxazole: R >2/38      Method Type: PRATIK    Culture - Blood (collected 04-21-22 @ 18:24)  Source: .Blood Blood-Peripheral  Final Report (04-26-22 @ 19:00):    No Growth Final    Culture - Blood (collected 04-21-22 @ 18:24)  Source: .Blood Blood-Peripheral  Final Report (04-26-22 @ 19:00):    No Growth Final        RADIOLOGY & ADDITIONAL TESTS:     Consultant(s) Notes Reviewed:  [x] YES  [ ] NO    Care Discussed with [x] Consultants  [x] Patient  [ ] Family  [ ]      [ x] Other; RN  DVT ppx

## 2022-04-28 NOTE — PROVIDER CONTACT NOTE (CRITICAL VALUE NOTIFICATION) - TEST AND RESULT REPORTED:
final report urine culture >100,000 e coli ESBL
Lactate 2.1
hgb=6.0, hct=20.3
sputum culture final moderate psuedomonas agrinosa, cre mod serratia marececans
lactate=2.8
Lactate 2.1

## 2022-04-28 NOTE — CHART NOTE - NSCHARTNOTEFT_GEN_A_CORE
Assessment: Pt seen for nutrition follow-up.  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 Patient unable to contribute to hx due to mental status."    4/28  Pt previously seen for nutrition assessment secondary to stage II or > pressure ulcer.  Per chart, pt admitted with stage II PU to R buttock, healed sacral pressure ulcer and foot ulcers to BL big toes.  Pt with hx trach/PEG.  Per transfer documents, receiving Glucerna 1.5 to goal 60ml/hr x 20hrs (provides 1200ml TV formula, 1800kcal, 99g protein; also receiving 250ml free water q 6hrs, +25ml hourly flushes).  Tube feeding initiated 4/22 per recommendations: Glucerna 1.5, start at 20ml/hr increase by 10ml every 6 hours until goal 60ml/hr x 20hrs is reached (provides 1800kcal based on 31kcal/kg IBW, 99g protein based on 1.7g/kg IBW, 1200ml TV formula, 912ml free water from formula), +free water flushes; hyponatremia resolved.  Noted PEG balloon broke this admission and replaced by GI.  TF resumed and pt tolerating PEG feedings @goal rate.  RD to follow closely and will continue to monitor pt's nutrition status.    Factors impacting intake: [ ] none [ ] nausea  [ ] vomiting [ ] diarrhea [ ] constipation  [ ]chewing problems [ ] swallowing issues  [ ] other:     Diet Prescription: Diet, NPO with Tube Feed:   Tube Feeding Modality: Gastrostomy  Glucerna 1.5 Horacio  Total Volume for 24 Hours (mL): 1200  Continuous  Starting Tube Feed Rate {mL per Hour}: 20  Increase Tube Feed Rate by (mL): 10     Every 6 hours  Until Goal Tube Feed Rate (mL per Hour): 60  Tube Feed Duration (in Hours): 20  Tube Feed Start Time: 00:00  Free Water Flush  Pump   Rate (mL per Hour): 30   Frequency: Every Hour    Duration (Hours): 24 (04-22-22 @ 12:49)    Intake:  tolerating peg feedings at goal rate    Current Weight: Weight (kg): 49.9 (04-21 @ 12:41)  % Weight Change  4/28 122.7#, no edema  4/27 119#  4/25 128.7#  4/24 125.2#    Pertinent Medications: MEDICATIONS  (STANDING):  chlorhexidine 0.12% Liquid 15 milliLiter(s) Oral Mucosa every 12 hours  chlorhexidine 2% Cloths 1 Application(s) Topical daily  collagenase Ointment 1 Application(s) Topical daily  dextrose 5%. 1000 milliLiter(s) (50 mL/Hr) IV Continuous <Continuous>  dextrose 5%. 1000 milliLiter(s) (100 mL/Hr) IV Continuous <Continuous>  dextrose 50% Injectable 25 Gram(s) IV Push once  dextrose 50% Injectable 12.5 Gram(s) IV Push once  dextrose 50% Injectable 25 Gram(s) IV Push once  folic acid 1 milliGRAM(s) Oral daily  glucagon  Injectable 1 milliGRAM(s) IntraMuscular once  heparin   Injectable 5000 Unit(s) SubCutaneous every 12 hours  insulin glargine Injectable (LANTUS) 10 Unit(s) SubCutaneous every morning  insulin lispro (ADMELOG) corrective regimen sliding scale   SubCutaneous every 6 hours  lactobacillus acidophilus 1 Tablet(s) Oral two times a day  LORazepam     Tablet 1 milliGRAM(s) Oral every 4 hours  methocarbamol 500 milliGRAM(s) Oral two times a day  mineral oil/petrolatum Hydrophilic Ointment 1 Application(s) Topical daily  multivitamin 1 Tablet(s) Oral daily  pantoprazole  Injectable 40 milliGRAM(s) IV Push daily  polyethylene glycol 3350 17 Gram(s) Oral two times a day  povidone iodine 10% Ointment 1 Application(s) Topical every 12 hours  senna 2 Tablet(s) Oral at bedtime  simethicone 80 milliGRAM(s) Chew every 6 hours  sucralfate 1 Gram(s) Oral four times a day    MEDICATIONS  (PRN):  acetaminophen     Tablet .. 650 milliGRAM(s) Oral every 6 hours PRN Temp greater or equal to 38C (100.4F)  ALBUTerol    90 MICROgram(s) HFA Inhaler 2 Puff(s) Inhalation every 6 hours PRN Shortness of Breath and/or Wheezing  dextrose Oral Gel 15 Gram(s) Oral once PRN Blood Glucose LESS THAN 70 milliGRAM(s)/deciliter  LORazepam   Injectable 1 milliGRAM(s) IV Push every 6 hours PRN Anxiety  sodium chloride 0.9% lock flush 10 milliLiter(s) IV Push every 1 hour PRN Pre/post blood products, medications, blood draw, and to maintain line patency    Pertinent Labs: 04-28 Na135 mmol/L Glu 186 mg/dL<H> K+ 4.4 mmol/L Cr  1.10 mg/dL BUN 31 mg/dL<H> 04-23 Phos 2.6 mg/dL 04-28 Alb 1.8 g/dL<L>     CAPILLARY BLOOD GLUCOSE      POCT Blood Glucose.: 179 mg/dL (28 Apr 2022 11:34)  POCT Blood Glucose.: 178 mg/dL (28 Apr 2022 08:10)  POCT Blood Glucose.: 207 mg/dL (28 Apr 2022 05:40)  POCT Blood Glucose.: 159 mg/dL (27 Apr 2022 23:35)  POCT Blood Glucose.: 208 mg/dL (27 Apr 2022 16:29)    Skin: PU - R buttock stage II, sacrum - healed, BL big toe foot ulcer    Estimated Needs:   [x ] no change since previous assessment  [ ] recalculated:     Previous Nutrition Diagnosis:   [ ] Inadequate Energy Intake [ ]Inadequate Oral Intake [ ] Excessive Energy Intake   [ ] Underweight [x ] Increased Nutrient Needs [ ] Overweight/Obesity   [ ] Altered GI Function [ ] Unintended Weight Loss [ ] Food & Nutrition Related Knowledge Deficit [ ] Malnutrition     Nutrition Diagnosis is [ x] ongoing  [ ] resolved [ ] not applicable     New Nutrition Diagnosis: [ ] not applicable       Interventions: tube feeding via peg, free water flushes  Recommend  [ ] Change Diet To:  [ ] Nutrition Supplement  [ ] Nutrition Support  [ ] Other:     Monitoring and Evaluation:   [ ] PO intake [ x ] Tolerance to EN prescription [ x ] weights [ x ] labs[ x ] follow up per protocol  [ ] other:
Chart reviewed.  PE unchanged  Received versed x 1 for vent dyssynchrony. Will consider escalating standing lorazepam if vent dyssynchrony continues   Norepi requirements stable    Remains on empiric pip/tazo  Remains in ICU for critical illness
Assessment: Pt seen for nutrition follow-up.  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 Patient unable to contribute to hx due to mental status."    4/25  Pt previously seen for nutrition assessment secondary to stage II or > pressure ulcer.  Per chart, pt admitted with stage II PU to R buttock, healed sacral pressure ulcer and foot ulcers to BL big toes.  Pt with hx trach/PEG.  Per transfer documents, receiving Glucerna 1.5 to goal 60ml/hr x 20hrs (provides 1200ml TV formula, 1800kcal, 99g protein; also receiving 250ml free water q 6hrs, +25ml hourly flushes).  Tube feeding initiated 4/22 per recommendations: Glucerna 1.5, start at 20ml/hr increase by 10ml every 6 hours until goal 60ml/hr x 20hrs is reached (to provide 1800kcal based on 31kcal/kg IBW, 99g protein based on 1.7g/kg IBW, 1200ml TV formula, 912ml free water from formula), +free water flushes; hyponatremia resolved.  Pt tolerating PEG feedings @goal rate per nursing.  RD to follow closely and will continue to monitor pt's nutrition status.    Factors impacting intake: [ ] none [ ] nausea  [ ] vomiting [ ] diarrhea [ ] constipation  [x ]chewing problems [x ] swallowing issues  [ x] other: trach/peg  Diet Prescription: Diet, NPO with Tube Feed:   Tube Feeding Modality: Gastrostomy  Glucerna 1.5 Horacio  Total Volume for 24 Hours (mL): 1200  Continuous  Starting Tube Feed Rate {mL per Hour}: 20  Increase Tube Feed Rate by (mL): 10     Every 6 hours  Until Goal Tube Feed Rate (mL per Hour): 60  Tube Feed Duration (in Hours): 20  Tube Feed Start Time: 00:00  Free Water Flush  Pump   Rate (mL per Hour): 30   Frequency: Every Hour    Duration (Hours): 24 (04-22-22 @ 12:49)    Intake: tolerating peg feedings at goal rate    Current Weight: Weight (kg): 49.9 (04-21 @ 12:41)  % Weight Change  4/25 128.7# no edema  4/24 125.2#    Pertinent Medications: MEDICATIONS  (STANDING):  chlorhexidine 0.12% Liquid 15 milliLiter(s) Oral Mucosa every 12 hours  chlorhexidine 2% Cloths 1 Application(s) Topical daily  dextrose 5%. 1000 milliLiter(s) (50 mL/Hr) IV Continuous <Continuous>  dextrose 5%. 1000 milliLiter(s) (100 mL/Hr) IV Continuous <Continuous>  dextrose 50% Injectable 25 Gram(s) IV Push once  dextrose 50% Injectable 12.5 Gram(s) IV Push once  dextrose 50% Injectable 25 Gram(s) IV Push once  folic acid 1 milliGRAM(s) Oral daily  glucagon  Injectable 1 milliGRAM(s) IntraMuscular once  heparin   Injectable 5000 Unit(s) SubCutaneous every 12 hours  insulin lispro (ADMELOG) corrective regimen sliding scale   SubCutaneous every 6 hours  lactobacillus acidophilus 1 Tablet(s) Oral two times a day  LORazepam     Tablet 1 milliGRAM(s) Oral every 4 hours  methocarbamol 500 milliGRAM(s) Oral two times a day  mineral oil/petrolatum Hydrophilic Ointment 1 Application(s) Topical daily  pantoprazole   Suspension 40 milliGRAM(s) Oral daily  piperacillin/tazobactam IVPB.. 3.375 Gram(s) IV Intermittent every 8 hours  polyethylene glycol 3350 17 Gram(s) Oral two times a day  povidone iodine 10% Ointment 1 Application(s) Topical every 12 hours  senna 2 Tablet(s) Oral at bedtime  silver sulfADIAZINE 1% Cream 1 Application(s) Topical daily  simethicone 80 milliGRAM(s) Chew every 6 hours  sucralfate 1 Gram(s) Oral four times a day    MEDICATIONS  (PRN):  acetaminophen     Tablet .. 650 milliGRAM(s) Oral every 6 hours PRN Temp greater or equal to 38C (100.4F)  ALBUTerol    90 MICROgram(s) HFA Inhaler 2 Puff(s) Inhalation every 6 hours PRN Shortness of Breath and/or Wheezing  dextrose Oral Gel 15 Gram(s) Oral once PRN Blood Glucose LESS THAN 70 milliGRAM(s)/deciliter  LORazepam   Injectable 1 milliGRAM(s) IV Push every 6 hours PRN Anxiety  sodium chloride 0.9% lock flush 10 milliLiter(s) IV Push every 1 hour PRN Pre/post blood products, medications, blood draw, and to maintain line patency    Pertinent Labs: 04-25 Na139 mmol/L Glu 211 mg/dL<H> K+ 4.3 mmol/L Cr  1.12 mg/dL BUN 46 mg/dL<H> 04-23 Phos 2.6 mg/dL 04-25 Alb 1.7 g/dL<L>     CAPILLARY BLOOD GLUCOSE      POCT Blood Glucose.: 233 mg/dL (25 Apr 2022 11:22)  POCT Blood Glucose.: 220 mg/dL (25 Apr 2022 07:28)  POCT Blood Glucose.: 193 mg/dL (25 Apr 2022 05:23)  POCT Blood Glucose.: 205 mg/dL (24 Apr 2022 23:57)  POCT Blood Glucose.: 213 mg/dL (24 Apr 2022 16:54)    Skin: PU - R buttock stage II, sacrum - healed, BL big toe foot ulcer    Estimated Needs:   [x ] no change since previous assessment  [ ] recalculated:     Previous Nutrition Diagnosis:   [ ] Inadequate Energy Intake [ ]Inadequate Oral Intake [ ] Excessive Energy Intake   [ ] Underweight [x ] Increased Nutrient Needs [ ] Overweight/Obesity   [ ] Altered GI Function [ ] Unintended Weight Loss [ ] Food & Nutrition Related Knowledge Deficit [ ] Malnutrition     Nutrition Diagnosis is [ x] ongoing  [ ] resolved [ ] not applicable     New Nutrition Diagnosis: [ ] not applicable       Interventions: tube feeding via peg, free water flushes  Recommend  [ ] Change Diet To:  [ ] Nutrition Supplement  [ ] Nutrition Support  [ ] Other:     Monitoring and Evaluation:   [ ] PO intake [ x ] Tolerance to EN prescription [ x ] weights [ x ] labs[ x ] follow up per protocol  [ ] other:

## 2022-04-28 NOTE — PROVIDER CONTACT NOTE (CRITICAL VALUE NOTIFICATION) - ACTION/TREATMENT ORDERED:
repeat lactate to be drawn in AM. see new orders
no new orders
waiting orders
l/m awaiting any new orders

## 2022-04-29 ENCOUNTER — TRANSCRIPTION ENCOUNTER (OUTPATIENT)
Age: 77
End: 2022-04-29

## 2022-04-29 VITALS — TEMPERATURE: 99 F

## 2022-04-29 LAB
ALBUMIN SERPL ELPH-MCNC: 1.9 G/DL — LOW (ref 3.3–5)
ALP SERPL-CCNC: 169 U/L — HIGH (ref 30–120)
ALT FLD-CCNC: 14 U/L DA — SIGNIFICANT CHANGE UP (ref 10–60)
ANION GAP SERPL CALC-SCNC: 8 MMOL/L — SIGNIFICANT CHANGE UP (ref 5–17)
AST SERPL-CCNC: 14 U/L — SIGNIFICANT CHANGE UP (ref 10–40)
BASOPHILS # BLD AUTO: 0.04 K/UL — SIGNIFICANT CHANGE UP (ref 0–0.2)
BASOPHILS NFR BLD AUTO: 0.6 % — SIGNIFICANT CHANGE UP (ref 0–2)
BILIRUB SERPL-MCNC: 0.4 MG/DL — SIGNIFICANT CHANGE UP (ref 0.2–1.2)
BUN SERPL-MCNC: 28 MG/DL — HIGH (ref 7–23)
CALCIUM SERPL-MCNC: 9 MG/DL — SIGNIFICANT CHANGE UP (ref 8.4–10.5)
CHLORIDE SERPL-SCNC: 105 MMOL/L — SIGNIFICANT CHANGE UP (ref 96–108)
CO2 SERPL-SCNC: 25 MMOL/L — SIGNIFICANT CHANGE UP (ref 22–31)
CREAT SERPL-MCNC: 1.04 MG/DL — SIGNIFICANT CHANGE UP (ref 0.5–1.3)
EGFR: 55 ML/MIN/1.73M2 — LOW
EOSINOPHIL # BLD AUTO: 0.16 K/UL — SIGNIFICANT CHANGE UP (ref 0–0.5)
EOSINOPHIL NFR BLD AUTO: 2.3 % — SIGNIFICANT CHANGE UP (ref 0–6)
GLUCOSE BLDC GLUCOMTR-MCNC: 141 MG/DL — HIGH (ref 70–99)
GLUCOSE BLDC GLUCOMTR-MCNC: 162 MG/DL — HIGH (ref 70–99)
GLUCOSE BLDC GLUCOMTR-MCNC: 191 MG/DL — HIGH (ref 70–99)
GLUCOSE SERPL-MCNC: 147 MG/DL — HIGH (ref 70–99)
HCT VFR BLD CALC: 29.8 % — LOW (ref 34.5–45)
HGB BLD-MCNC: 9 G/DL — LOW (ref 11.5–15.5)
IMM GRANULOCYTES NFR BLD AUTO: 3.3 % — HIGH (ref 0–1.5)
LYMPHOCYTES # BLD AUTO: 1.48 K/UL — SIGNIFICANT CHANGE UP (ref 1–3.3)
LYMPHOCYTES # BLD AUTO: 21.3 % — SIGNIFICANT CHANGE UP (ref 13–44)
MCHC RBC-ENTMCNC: 24.6 PG — LOW (ref 27–34)
MCHC RBC-ENTMCNC: 30.2 GM/DL — LOW (ref 32–36)
MCV RBC AUTO: 81.4 FL — SIGNIFICANT CHANGE UP (ref 80–100)
MONOCYTES # BLD AUTO: 0.79 K/UL — SIGNIFICANT CHANGE UP (ref 0–0.9)
MONOCYTES NFR BLD AUTO: 11.4 % — SIGNIFICANT CHANGE UP (ref 2–14)
NEUTROPHILS # BLD AUTO: 4.24 K/UL — SIGNIFICANT CHANGE UP (ref 1.8–7.4)
NEUTROPHILS NFR BLD AUTO: 61.1 % — SIGNIFICANT CHANGE UP (ref 43–77)
NRBC # BLD: 0 /100 WBCS — SIGNIFICANT CHANGE UP (ref 0–0)
PLATELET # BLD AUTO: 365 K/UL — SIGNIFICANT CHANGE UP (ref 150–400)
POTASSIUM SERPL-MCNC: 4.6 MMOL/L — SIGNIFICANT CHANGE UP (ref 3.5–5.3)
POTASSIUM SERPL-SCNC: 4.6 MMOL/L — SIGNIFICANT CHANGE UP (ref 3.5–5.3)
PROT SERPL-MCNC: 7 G/DL — SIGNIFICANT CHANGE UP (ref 6–8.3)
RBC # BLD: 3.66 M/UL — LOW (ref 3.8–5.2)
RBC # FLD: 19.8 % — HIGH (ref 10.3–14.5)
SODIUM SERPL-SCNC: 138 MMOL/L — SIGNIFICANT CHANGE UP (ref 135–145)
WBC # BLD: 6.94 K/UL — SIGNIFICANT CHANGE UP (ref 3.8–10.5)
WBC # FLD AUTO: 6.94 K/UL — SIGNIFICANT CHANGE UP (ref 3.8–10.5)

## 2022-04-29 PROCEDURE — P9016: CPT

## 2022-04-29 PROCEDURE — 87077 CULTURE AEROBIC IDENTIFY: CPT

## 2022-04-29 PROCEDURE — 80048 BASIC METABOLIC PNL TOTAL CA: CPT

## 2022-04-29 PROCEDURE — 86850 RBC ANTIBODY SCREEN: CPT

## 2022-04-29 PROCEDURE — 80053 COMPREHEN METABOLIC PANEL: CPT

## 2022-04-29 PROCEDURE — 71250 CT THORAX DX C-: CPT | Mod: MD

## 2022-04-29 PROCEDURE — 83540 ASSAY OF IRON: CPT

## 2022-04-29 PROCEDURE — 85610 PROTHROMBIN TIME: CPT

## 2022-04-29 PROCEDURE — 99239 HOSP IP/OBS DSCHRG MGMT >30: CPT

## 2022-04-29 PROCEDURE — 36430 TRANSFUSION BLD/BLD COMPNT: CPT

## 2022-04-29 PROCEDURE — 94003 VENT MGMT INPAT SUBQ DAY: CPT

## 2022-04-29 PROCEDURE — 87493 C DIFF AMPLIFIED PROBE: CPT

## 2022-04-29 PROCEDURE — 70450 CT HEAD/BRAIN W/O DYE: CPT | Mod: MD

## 2022-04-29 PROCEDURE — 86901 BLOOD TYPING SEROLOGIC RH(D): CPT

## 2022-04-29 PROCEDURE — 94002 VENT MGMT INPAT INIT DAY: CPT

## 2022-04-29 PROCEDURE — 83735 ASSAY OF MAGNESIUM: CPT

## 2022-04-29 PROCEDURE — 82962 GLUCOSE BLOOD TEST: CPT

## 2022-04-29 PROCEDURE — 36415 COLL VENOUS BLD VENIPUNCTURE: CPT

## 2022-04-29 PROCEDURE — 83605 ASSAY OF LACTIC ACID: CPT

## 2022-04-29 PROCEDURE — 82607 VITAMIN B-12: CPT

## 2022-04-29 PROCEDURE — 94760 N-INVAS EAR/PLS OXIMETRY 1: CPT

## 2022-04-29 PROCEDURE — 0225U NFCT DS DNA&RNA 21 SARSCOV2: CPT

## 2022-04-29 PROCEDURE — 87070 CULTURE OTHR SPECIMN AEROBIC: CPT

## 2022-04-29 PROCEDURE — 94799 UNLISTED PULMONARY SVC/PX: CPT

## 2022-04-29 PROCEDURE — 94640 AIRWAY INHALATION TREATMENT: CPT

## 2022-04-29 PROCEDURE — 87186 SC STD MICRODIL/AGAR DIL: CPT

## 2022-04-29 PROCEDURE — 86900 BLOOD TYPING SEROLOGIC ABO: CPT

## 2022-04-29 PROCEDURE — 87635 SARS-COV-2 COVID-19 AMP PRB: CPT

## 2022-04-29 PROCEDURE — 96365 THER/PROPH/DIAG IV INF INIT: CPT

## 2022-04-29 PROCEDURE — 87086 URINE CULTURE/COLONY COUNT: CPT

## 2022-04-29 PROCEDURE — 85025 COMPLETE CBC W/AUTO DIFF WBC: CPT

## 2022-04-29 PROCEDURE — 85730 THROMBOPLASTIN TIME PARTIAL: CPT

## 2022-04-29 PROCEDURE — 87040 BLOOD CULTURE FOR BACTERIA: CPT

## 2022-04-29 PROCEDURE — 93005 ELECTROCARDIOGRAM TRACING: CPT

## 2022-04-29 PROCEDURE — 74176 CT ABD & PELVIS W/O CONTRAST: CPT | Mod: MD

## 2022-04-29 PROCEDURE — 82728 ASSAY OF FERRITIN: CPT

## 2022-04-29 PROCEDURE — 84100 ASSAY OF PHOSPHORUS: CPT

## 2022-04-29 PROCEDURE — 71045 X-RAY EXAM CHEST 1 VIEW: CPT

## 2022-04-29 PROCEDURE — 99291 CRITICAL CARE FIRST HOUR: CPT | Mod: 25

## 2022-04-29 PROCEDURE — 83550 IRON BINDING TEST: CPT

## 2022-04-29 PROCEDURE — 83036 HEMOGLOBIN GLYCOSYLATED A1C: CPT

## 2022-04-29 PROCEDURE — 82272 OCCULT BLD FECES 1-3 TESTS: CPT

## 2022-04-29 PROCEDURE — 86923 COMPATIBILITY TEST ELECTRIC: CPT

## 2022-04-29 PROCEDURE — 85027 COMPLETE CBC AUTOMATED: CPT

## 2022-04-29 PROCEDURE — 81001 URINALYSIS AUTO W/SCOPE: CPT

## 2022-04-29 RX ORDER — FOLIC ACID 0.8 MG
1 TABLET ORAL
Qty: 0 | Refills: 0 | DISCHARGE
Start: 2022-04-29

## 2022-04-29 RX ADMIN — POLYETHYLENE GLYCOL 3350 17 GRAM(S): 17 POWDER, FOR SOLUTION ORAL at 06:26

## 2022-04-29 RX ADMIN — Medication 1 GRAM(S): at 06:25

## 2022-04-29 RX ADMIN — Medication 1 APPLICATION(S): at 06:26

## 2022-04-29 RX ADMIN — HEPARIN SODIUM 5000 UNIT(S): 5000 INJECTION INTRAVENOUS; SUBCUTANEOUS at 06:25

## 2022-04-29 RX ADMIN — Medication 1 MILLIGRAM(S): at 04:44

## 2022-04-29 RX ADMIN — CHLORHEXIDINE GLUCONATE 1 APPLICATION(S): 213 SOLUTION TOPICAL at 12:13

## 2022-04-29 RX ADMIN — Medication 1 TABLET(S): at 12:14

## 2022-04-29 RX ADMIN — SIMETHICONE 80 MILLIGRAM(S): 80 TABLET, CHEWABLE ORAL at 06:25

## 2022-04-29 RX ADMIN — Medication 2: at 12:13

## 2022-04-29 RX ADMIN — Medication 1 TABLET(S): at 06:25

## 2022-04-29 RX ADMIN — Medication 1 MILLIGRAM(S): at 12:14

## 2022-04-29 RX ADMIN — INSULIN GLARGINE 10 UNIT(S): 100 INJECTION, SOLUTION SUBCUTANEOUS at 07:40

## 2022-04-29 RX ADMIN — METHOCARBAMOL 500 MILLIGRAM(S): 500 TABLET, FILM COATED ORAL at 06:25

## 2022-04-29 RX ADMIN — Medication 1 APPLICATION(S): at 12:15

## 2022-04-29 RX ADMIN — CHLORHEXIDINE GLUCONATE 15 MILLILITER(S): 213 SOLUTION TOPICAL at 06:25

## 2022-04-29 RX ADMIN — Medication 1 GRAM(S): at 12:14

## 2022-04-29 RX ADMIN — SIMETHICONE 80 MILLIGRAM(S): 80 TABLET, CHEWABLE ORAL at 12:14

## 2022-04-29 RX ADMIN — PANTOPRAZOLE SODIUM 40 MILLIGRAM(S): 20 TABLET, DELAYED RELEASE ORAL at 12:14

## 2022-04-29 RX ADMIN — Medication 1 MILLIGRAM(S): at 07:40

## 2022-04-29 RX ADMIN — Medication 1 MILLIGRAM(S): at 03:02

## 2022-04-29 NOTE — PROGRESS NOTE ADULT - TIME BILLING
The total amount of time listed was spent reviewing the hospital notes, laboratory values, imaging findings, assessing/counseling the patient, discussing with consultant physicians, case management, social work, and nursing staff.
Note written by attending, see above.  Time spent: 45min.
The total amount of time listed was spent reviewing the hospital notes, laboratory values, imaging findings, assessing/counseling the patient, discussing with consultant physicians, case management, social work, and nursing staff.
The total amount of time listed was spent reviewing the hospital notes, laboratory values, imaging findings, assessing/counseling the patient, discussing with consultant physicians, case management, social work, and nursing staff.

## 2022-04-29 NOTE — PROGRESS NOTE ADULT - SUBJECTIVE AND OBJECTIVE BOX
Chief Complaint: Respiratory failure    Interval Events: No events overnight.    Review of Systems:  Unable to obtain    Physical Exam:  Vital Signs Last 24 Hrs  T(C): 36.9 (29 Apr 2022 04:43), Max: 36.9 (29 Apr 2022 04:43)  T(F): 98.4 (29 Apr 2022 04:43), Max: 98.4 (29 Apr 2022 04:43)  HR: 89 (29 Apr 2022 08:06) (79 - 116)  BP: 129/75 (29 Apr 2022 08:00) (121/70 - 157/78)  BP(mean): 91 (29 Apr 2022 08:00) (86 - 100)  RR: 18 (29 Apr 2022 08:00) (18 - 30)  SpO2: 99% (29 Apr 2022 08:06) (93% - 100%)  General: NAD  HEENT: Trach  Neck: No JVD, no carotid bruit  Lungs: Coarse bilaterally  CV: RRR, nl S1/S2, no M/R/G  Abdomen: S/NT/ND, +BS  Extremities: No LE edema, no cyanosis  Neuro: AAOx0  Skin: No rash    Labs:    04-29    138  |  105  |  28<H>  ----------------------------<  147<H>  4.6   |  25  |  1.04    Ca    9.0      29 Apr 2022 06:56    TPro  7.0  /  Alb  1.9<L>  /  TBili  0.4  /  DBili  x   /  AST  14  /  ALT  14  /  AlkPhos  169<H>  04-29                        9.0    6.94  )-----------( 365      ( 29 Apr 2022 06:56 )             29.8         Telemetry: Sinus rhythm

## 2022-04-29 NOTE — PROVIDER CONTACT NOTE (EICU) - SITUATION
avinash received from bedside nurse. pt early this morning with tachypnea, respiratory distress on vent given versed 1mg with good response. she is on standing ativan 1mg via PEG. this afternoon again with episodes of vent dyssynchrony, tachypneic with accessory muscle use RR 20-30s.
eLert by bedside RNs for hypotension
Request by bedside team for serum ammonia level as part of labs this AM.  Orders placed and imaging/results to be followed up by primary team.
eLert for CXR order
eLerted by bedside team. Patient tachypneic and discordant with ventilator.  Patient receives standing lorazepam via PEG.  Versed 1 mg IVP x 1 ordered.  Orders placed and imaging/results to be followed up by primary team.

## 2022-04-29 NOTE — PROGRESS NOTE ADULT - SUBJECTIVE AND OBJECTIVE BOX
[facility], Division of Infectious Diseases  MOIZ Lora Y. Patel, S. Shah, G. Casimir  [phone]  ([phone] - weekdays after 5pm and weekends)    Name: BREA BECKHAM  Age/Gender: 76y Female  MRN: [id number]    Interval History:  Patient seen this morning.   Notes reviewed.   No concerning overnight events.  Afebrile.     Allergies: codeine (Hives)      Objective:  Vitals:   T(F): 98.4 (04-29-22 @ 04:43), Max: 98.4 (04-29-22 @ 04:43)  HR: 89 (04-29-22 @ 08:06) (79 - 116)  BP: 129/75 (04-29-22 @ 08:00) (121/70 - 157/78)  RR: 18 (04-29-22 @ 08:00) (18 - 30)  SpO2: 99% (04-29-22 @ 08:06) (93% - 100%)  Physical Examination:  General: no acute distress, awake  HEENT: anicteric  Cardio: S1, S2 present, normal rate  Resp: clear to auscultation anteriorly, on vent  Abd: soft, distended, PEG tube  Ext: b/l LE edema  Skin: warm, dry  Lines:    Laboratory Studies:  CBC:                       9.0    6.94  )-----------( 365      ( 29 Apr 2022 06:56 )             29.8     WBC Trend:  6.94 04-29-22 @ 06:56  6.44 04-28-22 @ 06:20  9.20 04-27-22 @ 08:50  10.48 04-26-22 @ 06:39  6.81 04-25-22 @ 05:58  9.33 04-24-22 @ 05:50  11.35 04-23-22 @ 06:07    CMP: 04-29    138  |  105  |  28<H>  ----------------------------<  147<H>  4.6   |  25  |  1.04    Ca    9.0      29 Apr 2022 06:56    TPro  7.0  /  Alb  1.9<L>  /  TBili  0.4  /  DBili  x   /  AST  14  /  ALT  14  /  AlkPhos  169<H>  04-29      LIVER FUNCTIONS - ( 29 Apr 2022 06:56 )  Alb: 1.9 g/dL / Pro: 7.0 g/dL / ALK PHOS: 169 U/L / ALT: 14 U/L DA / AST: 14 U/L / GGT: x               Microbiology: reviewed     Culture - Sputum (collected 04-24-22 @ 12:35)  Source: .Sputum Sputum  Gram Stain (04-24-22 @ 15:10):    Few Squamous epithelial cells per low power field    Moderate polymorphonuclear leukocytes per low power field    Moderate Gram Negative Rods per oil power field  Final Report (04-27-22 @ 07:34):    Moderate Pseudomonas aeruginosa (Carbapenem Resistant)    Moderate Serratia marcescens    Normal Respiratory Elvira present  Organism: Pseudomonas aeruginosa (Carbapenem Resistant)  Serratia marcescens (04-27-22 @ 07:34)  Organism: Serratia marcescens (04-27-22 @ 07:34)      -  Amikacin: S <=16      -  Amoxicillin/Clavulanic Acid: R >16/8      -  Ampicillin: R >16 These ampicillin results predict results for amoxicillin      -  Ampicillin/Sulbactam: R >16/8 Enterobacter, Klebsiella aerogenes, Citrobacter, and Serratia may develop resistance during prolonged therapy (3-4 days)      -  Aztreonam: R >16      -  Cefazolin: R >16 Enterobacter, Klebsiella aerogenes, Citrobacter, and Serratia may develop resistance during prolonged therapy (3-4 days)      -  Cefepime: S 8      -  Cefoxitin: R >16      -  Ceftriaxone: R >32 Enterobacter, Klebsiella aerogenes, Citrobacter, and Serratia may develop resistance during prolonged therapy      -  Ciprofloxacin: R 2      -  Ertapenem: S <=0.5      -  Gentamicin: S <=2      -  Levofloxacin: R 2      -  Meropenem: S <=1      -  Piperacillin/Tazobactam: S 16      -  Tobramycin: S 4      -  Trimethoprim/Sulfamethoxazole: S <=0.5/9.5      Method Type: PRATIK  Organism: Pseudomonas aeruginosa (Carbapenem Resistant) (04-27-22 @ 07:34)      -  Amikacin: S <=16      -  Aztreonam: S <=4      -  Cefepime: S 8      -  Ceftazidime: S 8      -  Ciprofloxacin: S <=0.25      -  Gentamicin: S <=2      -  Imipenem: R >8      -  Levofloxacin: S <=0.5      -  Meropenem: R 8      -  Piperacillin/Tazobactam: S 16      -  Tobramycin: S <=2      Method Type: PRATIK    Culture - Other (collected 04-22-22 @ 20:58)  Source: Skin Skin  Final Report (04-25-22 @ 21:04):    Culture yields growth of greater than 3 colony types of    Call client services within 7 days if further workup is clinically    indicated.    Culture - Urine (collected 04-21-22 @ 18:36)  Source: Clean Catch Clean Catch (Midstream)  Final Report (04-24-22 @ 08:46):    >100,000 CFU/ml Escherichia coli ESBL  Organism: Escherichia coli ESBL (04-24-22 @ 08:46)  Organism: Escherichia coli ESBL (04-24-22 @ 08:46)      -  Amikacin: S <=16      -  Amoxicillin/Clavulanic Acid: I 16/8      -  Ampicillin: R >16 These ampicillin results predict results for amoxicillin      -  Ampicillin/Sulbactam: R >16/8 Enterobacter, Klebsiella aerogenes, Citrobacter, and Serratia may develop resistance during prolonged therapy (3-4 days)      -  Aztreonam: R >16      -  Cefazolin: R >16 (MIC_CL_COM_ENTERIC_CEFAZU) For uncomplicated UTI with K. pneumoniae, E. coli, or P. mirablis: PRATIK <=16 is sensitive and PRATIK >=32 is resistant. This also predicts results for oral agents cefaclor, cefdinir, cefpodoxime, cefprozil, cefuroxime axetil, cephalexin and locarbef for uncomplicated UTI. Note that some isolates may be susceptible to these agents while testing resistant to cefazolin.      -  Cefepime: R >16      -  Ceftriaxone: R >32 Enterobacter, Klebsiella aerogenes, Citrobacter, and Serratia may develop resistance during prolonged therapy      -  Ciprofloxacin: R >2      -  Ertapenem: S <=0.5      -  Gentamicin: R >8      -  Imipenem: S <=1      -  Levofloxacin: R 4      -  Meropenem: S <=1      -  Nitrofurantoin: S <=32 Should not be used to treat pyelonephritis      -  Piperacillin/Tazobactam: S <=8      -  Tigecycline: S <=2      -  Tobramycin: R >8      -  Trimethoprim/Sulfamethoxazole: R >2/38      Method Type: PRATIK    Culture - Blood (collected 04-21-22 @ 18:24)  Source: .Blood Blood-Peripheral  Final Report (04-26-22 @ 19:00):    No Growth Final    Culture - Blood (collected 04-21-22 @ 18:24)  Source: .Blood Blood-Peripheral  Final Report (04-26-22 @ 19:00):    No Growth Final      Radiology: reviewed     Medications:  acetaminophen     Tablet .. 650 milliGRAM(s) Oral every 6 hours PRN  ALBUTerol    90 MICROgram(s) HFA Inhaler 2 Puff(s) Inhalation every 6 hours PRN  chlorhexidine 0.12% Liquid 15 milliLiter(s) Oral Mucosa every 12 hours  chlorhexidine 2% Cloths 1 Application(s) Topical daily  collagenase Ointment 1 Application(s) Topical daily  dextrose 5%. 1000 milliLiter(s) IV Continuous <Continuous>  dextrose 5%. 1000 milliLiter(s) IV Continuous <Continuous>  dextrose 50% Injectable 25 Gram(s) IV Push once  dextrose 50% Injectable 12.5 Gram(s) IV Push once  dextrose 50% Injectable 25 Gram(s) IV Push once  dextrose Oral Gel 15 Gram(s) Oral once PRN  folic acid 1 milliGRAM(s) Oral daily  glucagon  Injectable 1 milliGRAM(s) IntraMuscular once  heparin   Injectable 5000 Unit(s) SubCutaneous every 12 hours  insulin glargine Injectable (LANTUS) 10 Unit(s) SubCutaneous every morning  insulin lispro (ADMELOG) corrective regimen sliding scale   SubCutaneous every 6 hours  lactobacillus acidophilus 1 Tablet(s) Oral two times a day  LORazepam     Tablet 1 milliGRAM(s) Oral every 4 hours  LORazepam   Injectable 1 milliGRAM(s) IV Push every 6 hours PRN  methocarbamol 500 milliGRAM(s) Oral two times a day  mineral oil/petrolatum Hydrophilic Ointment 1 Application(s) Topical daily  multivitamin 1 Tablet(s) Oral daily  pantoprazole  Injectable 40 milliGRAM(s) IV Push daily  polyethylene glycol 3350 17 Gram(s) Oral two times a day  povidone iodine 10% Ointment 1 Application(s) Topical every 12 hours  senna 2 Tablet(s) Oral at bedtime  simethicone 80 milliGRAM(s) Chew every 6 hours  sodium chloride 0.9% lock flush 10 milliLiter(s) IV Push every 1 hour PRN  sucralfate 1 Gram(s) Oral four times a day    Antimicrobials:

## 2022-04-29 NOTE — PROGRESS NOTE ADULT - ASSESSMENT
76F Chronic Respiratory Failure, HTN, DM2, Dementia, admitted for Septic Shock.     Acute on Chronic Hypoxic Respiratory Failure /  Septic Shock   Imaging shows inflitrates and pleural effusions; UC showing ESBL UTI; C. Diff studies are indeterminate  Off Pressors and hemodynamics improved; On Vent via Trach   Arterial Line removed  Discussed with ID, completed course of abx    Acute Blood Loss Anemia   S/P 1U PRBC Transfusion and started on Folic Acid   Has Anemia of Chronic Disease   Hematology consult noted   Gave additional unit 4/28 with appropriate response    Acute Renal Failure  Improved and from sepsis   Monitor BMP and Electrolytes     Diabetes type II  Insulin Corrective Scale  Finger sticks per routine   Hypoglycemia protocol  Lantus Q am     Quadriplegia  c/w nursing care for all ADLs    COPD  c/w inhalers   pulm (Jaime), recs appreciated    GERD  c/w carafate and PPI     Disposition  Discharge to [facility]

## 2022-04-29 NOTE — DISCHARGE NOTE PROVIDER - NSDCMRMEDTOKEN_GEN_ALL_CORE_FT
albuterol 90 mcg/inh inhalation aerosol with adapter: 2  inhaled every 6 hours  Bacid (LAC) oral tablet: 2 tab(s) by gastrostomy tube once a day  Basaglar KwikPen 100 units/mL subcutaneous solution: 36 unit(s) subcutaneous once a day (at bedtime)  Betadine 10% topical swab: cleanse right and left great toes  Carafate 1 g/10 mL oral suspension: 10 milliliter(s) by gastrostomy tube 4 times a day (before meals and at bedtime) for 14 days (Started 6/4/21)  chlorhexidine 0.12% mucous membrane liquid: 15 milliliter(s) mucous membrane 2 times a day  Eucerin topical cream: Apply topically to affected area once a day bilateral feet  folic acid 1 mg oral tablet: 1 tab(s) orally once a day  ipratropium-albuterol 0.5 mg-2.5 mg/3 mL inhalation solution: 3 milliliter(s) inhaled 4 times a day  LORazepam 1 mg oral tablet: 1 tab(s) by gastrostomy tube every 4 hours  methocarbamol 500 mg oral tablet: 1 tab(s) by gastrostomy tube 2 times a day  Multiple Vitamins oral tablet: 1 tab(s) orally once a day  pantoprazole: 20 milliliter(s) by gastrostomy tube 2 times a day  polyethylene glycol 3350 oral powder for reconstitution: 17 gram(s) by gastrostomy tube every 12 hours  senna 8.6 mg oral tablet: 3 tab(s) by gastrostomy tube once a day (at bedtime)  silver sulfADIAZINE 1% topical cream: Apply topically to affected area once a day to sacrum  simethicone 80 mg oral tablet, chewable: 1 tab(s) by gastrostomy tube every 6 hours  Tylenol 325 mg oral tablet: 2 tab(s) by gastrostomy tube once a day; 60 minutes prior to dressing change   Tylenol 325 mg oral tablet: 2 tab(s) by gastrostomy tube every 6 hours, As Needed

## 2022-04-29 NOTE — PROGRESS NOTE ADULT - SUBJECTIVE AND OBJECTIVE BOX
Neurology follow up note    BREA [id number]      Interval History:      Patient resting in bed     Allergies    codeine (Hives)    Intolerances        MEDICATIONS    acetaminophen     Tablet .. 650 milliGRAM(s) Oral every 6 hours PRN  ALBUTerol    90 MICROgram(s) HFA Inhaler 2 Puff(s) Inhalation every 6 hours PRN  chlorhexidine 0.12% Liquid 15 milliLiter(s) Oral Mucosa every 12 hours  chlorhexidine 2% Cloths 1 Application(s) Topical daily  collagenase Ointment 1 Application(s) Topical daily  dextrose 5%. 1000 milliLiter(s) IV Continuous <Continuous>  dextrose 5%. 1000 milliLiter(s) IV Continuous <Continuous>  dextrose 50% Injectable 25 Gram(s) IV Push once  dextrose 50% Injectable 12.5 Gram(s) IV Push once  dextrose 50% Injectable 25 Gram(s) IV Push once  dextrose Oral Gel 15 Gram(s) Oral once PRN  folic acid 1 milliGRAM(s) Oral daily  glucagon  Injectable 1 milliGRAM(s) IntraMuscular once  heparin   Injectable 5000 Unit(s) SubCutaneous every 12 hours  insulin glargine Injectable (LANTUS) 10 Unit(s) SubCutaneous every morning  insulin lispro (ADMELOG) corrective regimen sliding scale   SubCutaneous every 6 hours  lactobacillus acidophilus 1 Tablet(s) Oral two times a day  LORazepam     Tablet 1 milliGRAM(s) Oral every 4 hours  LORazepam   Injectable 1 milliGRAM(s) IV Push every 6 hours PRN  methocarbamol 500 milliGRAM(s) Oral two times a day  mineral oil/petrolatum Hydrophilic Ointment 1 Application(s) Topical daily  multivitamin 1 Tablet(s) Oral daily  pantoprazole  Injectable 40 milliGRAM(s) IV Push daily  polyethylene glycol 3350 17 Gram(s) Oral two times a day  povidone iodine 10% Ointment 1 Application(s) Topical every 12 hours  senna 2 Tablet(s) Oral at bedtime  simethicone 80 milliGRAM(s) Chew every 6 hours  sodium chloride 0.9% lock flush 10 milliLiter(s) IV Push every 1 hour PRN  sucralfate 1 Gram(s) Oral four times a day              Vital Signs Last 24 Hrs  T(C): 36.9 (29 Apr 2022 10:38), Max: 36.9 (29 Apr 2022 04:43)  T(F): 98.4 (29 Apr 2022 10:38), Max: 98.4 (29 Apr 2022 04:43)  HR: 86 (29 Apr 2022 10:00) (79 - 116)  BP: 150/80 (29 Apr 2022 10:00) (121/70 - 157/78)  BP(mean): 100 (29 Apr 2022 10:00) (86 - 100)  RR: 23 (29 Apr 2022 10:00) (18 - 30)  SpO2: 100% (29 Apr 2022 10:00) (93% - 100%)      REVIEW OF SYSTEMS:  Cannot be obtained secondary to the patient being nonverbal.    PHYSICAL EXAMINATION:    HEENT:  Head:  Normocephalic.  Eyes:  No scleral icterus.  Ears:  Unable to evaluate with the patient being nonverbal and unresponsive.  NECK:  Tracheostomy was in place.  RESPIRATORY: Decreased breath sounds bilaterally.  ABDOMEN: Soft and nontender.  EXTREMITIES:  No clubbing or cyanosis was noted.      NEUROLOGIC:  No response to verbal stimuli.  I opened the patient's eyes, no blink to visual threat.  To painful stimuli, slight withdrawal bilateral upper and lower.  Motor:  Bilateral upper extremity was in flexed position.  Bilateral lower extremities were straight.  Upon stimulation, the patient would have subtle shaking.                 LABS:  CBC Full  -  ( 29 Apr 2022 06:56 )  WBC Count : 6.94 K/uL  RBC Count : 3.66 M/uL  Hemoglobin : 9.0 g/dL  Hematocrit : 29.8 %  Platelet Count - Automated : 365 K/uL  Mean Cell Volume : 81.4 fl  Mean Cell Hemoglobin : 24.6 pg  Mean Cell Hemoglobin Concentration : 30.2 gm/dL  Auto Neutrophil # : 4.24 K/uL  Auto Lymphocyte # : 1.48 K/uL  Auto Monocyte # : 0.79 K/uL  Auto Eosinophil # : 0.16 K/uL  Auto Basophil # : 0.04 K/uL  Auto Neutrophil % : 61.1 %  Auto Lymphocyte % : 21.3 %  Auto Monocyte % : 11.4 %  Auto Eosinophil % : 2.3 %  Auto Basophil % : 0.6 %      04-29    138  |  105  |  28<H>  ----------------------------<  147<H>  4.6   |  25  |  1.04    Ca    9.0      29 Apr 2022 06:56    TPro  7.0  /  Alb  1.9<L>  /  TBili  0.4  /  DBili  x   /  AST  14  /  ALT  14  /  AlkPhos  169<H>  04-29    Hemoglobin A1C:     LIVER FUNCTIONS - ( 29 Apr 2022 06:56 )  Alb: 1.9 g/dL / Pro: 7.0 g/dL / ALK PHOS: 169 U/L / ALT: 14 U/L DA / AST: 14 U/L / GGT: x           Vitamin B12         RADIOLOGY      ANALYSIS AND PLAN:  This is a 76-year-old with altered mental status and history of abnormal movements.  For altered mental status, most likely metabolic encephalopathy secondary to underlying pneumonia-type process along with signs of dehydration from SMA-7.  Antibiotics as needed.  Monitor renal function.  For abnormal movements, the patient has been evaluated multiple times in the past, as per my conversation with spouse, these were deemed nonepileptiform, at present we will refrain from any antiseizure medication and had multiple conversations in the past with spouse, he does not want any.  For history of spasms, continue the patient on muscle relaxants.  For history of diabetes, strict control of blood sugars.  If possible, please maintain a systolic blood pressure above 100 to help maintain cerebral perfusion.    Greater than 22 minutes of time spent with the patient, planning care, reviewing data, and speaking to multidisciplinary healthcare team with greater than 50% of that time in counseling and care coordination.

## 2022-04-29 NOTE — PROGRESS NOTE ADULT - SUBJECTIVE AND OBJECTIVE BOX
BREA BECKHAM    [facility]    Allergies    codeine (Hives)    Intolerances        PAST MEDICAL & SURGICAL HISTORY:  Dementia of frontal lobe type    Aphasic stroke    Diabetes mellitus    Respiratory failure    Hypertension    GERD (gastroesophageal reflux disease)    Constipation    Respiratory failure    CVA (cerebral vascular accident)    HTN (hypertension)    DM (diabetes mellitus)    Advanced dementia    COVID-19 virus detected    Quadriplegia    Pneumonia    Type II diabetes mellitus    Hx of appendectomy    Gastrostomy in place    Tracheostomy in place    Tracheostomy tube present    Feeding by G-tube        FAMILY HISTORY:  No pertinent family history in first degree relatives        Home Medications:  albuterol 90 mcg/inh inhalation aerosol with adapter: 2  inhaled every 6 hours (21 Apr 2022 14:01)  Bacid (LAC) oral tablet: 2 tab(s) by gastrostomy tube once a day (21 Apr 2022 13:45)  Basaglar KwikPen 100 units/mL subcutaneous solution: 36 unit(s) subcutaneous once a day (at bedtime) (21 Apr 2022 14:01)  Betadine 10% topical swab: cleanse right and left great toes (21 Apr 2022 14:01)  Carafate 1 g/10 mL oral suspension: 10 milliliter(s) by gastrostomy tube 4 times a day (before meals and at bedtime) for 14 days (Started 6/4/21) (21 Apr 2022 13:45)  chlorhexidine 0.12% mucous membrane liquid: 15 milliliter(s) mucous membrane 2 times a day (21 Apr 2022 13:45)  Eucerin topical cream: Apply topically to affected area once a day bilateral feet (21 Apr 2022 14:01)  ipratropium-albuterol 0.5 mg-2.5 mg/3 mL inhalation solution: 3 milliliter(s) inhaled 4 times a day (21 Apr 2022 13:45)  LORazepam 1 mg oral tablet: 1 tab(s) by gastrostomy tube every 4 hours (21 Apr 2022 14:01)  methocarbamol 500 mg oral tablet: 1 tab(s) by gastrostomy tube 2 times a day (21 Apr 2022 14:01)  pantoprazole: 20 milliliter(s) by gastrostomy tube 2 times a day (21 Apr 2022 14:01)  polyethylene glycol 3350 oral powder for reconstitution: 17 gram(s) by gastrostomy tube every 12 hours (21 Apr 2022 14:01)  senna 8.6 mg oral tablet: 3 tab(s) by gastrostomy tube once a day (at bedtime) (21 Apr 2022 13:45)  silver sulfADIAZINE 1% topical cream: Apply topically to affected area once a day to sacrum (21 Apr 2022 14:01)  simethicone 80 mg oral tablet, chewable: 1 tab(s) by gastrostomy tube every 6 hours (21 Apr 2022 14:01)  Tylenol 325 mg oral tablet: 2 tab(s) by gastrostomy tube once a day; 60 minutes prior to dressing change  (21 Apr 2022 13:45)  Tylenol 325 mg oral tablet: 2 tab(s) by gastrostomy tube every 6 hours, As Needed (21 Apr 2022 14:01)      MEDICATIONS  (STANDING):  chlorhexidine 0.12% Liquid 15 milliLiter(s) Oral Mucosa every 12 hours  chlorhexidine 2% Cloths 1 Application(s) Topical daily  collagenase Ointment 1 Application(s) Topical daily  dextrose 5%. 1000 milliLiter(s) (100 mL/Hr) IV Continuous <Continuous>  dextrose 5%. 1000 milliLiter(s) (50 mL/Hr) IV Continuous <Continuous>  dextrose 50% Injectable 25 Gram(s) IV Push once  dextrose 50% Injectable 12.5 Gram(s) IV Push once  dextrose 50% Injectable 25 Gram(s) IV Push once  folic acid 1 milliGRAM(s) Oral daily  glucagon  Injectable 1 milliGRAM(s) IntraMuscular once  heparin   Injectable 5000 Unit(s) SubCutaneous every 12 hours  insulin glargine Injectable (LANTUS) 10 Unit(s) SubCutaneous every morning  insulin lispro (ADMELOG) corrective regimen sliding scale   SubCutaneous every 6 hours  lactobacillus acidophilus 1 Tablet(s) Oral two times a day  LORazepam     Tablet 1 milliGRAM(s) Oral every 4 hours  methocarbamol 500 milliGRAM(s) Oral two times a day  mineral oil/petrolatum Hydrophilic Ointment 1 Application(s) Topical daily  multivitamin 1 Tablet(s) Oral daily  pantoprazole  Injectable 40 milliGRAM(s) IV Push daily  polyethylene glycol 3350 17 Gram(s) Oral two times a day  povidone iodine 10% Ointment 1 Application(s) Topical every 12 hours  senna 2 Tablet(s) Oral at bedtime  simethicone 80 milliGRAM(s) Chew every 6 hours  sucralfate 1 Gram(s) Oral four times a day    MEDICATIONS  (PRN):  acetaminophen     Tablet .. 650 milliGRAM(s) Oral every 6 hours PRN Temp greater or equal to 38C (100.4F)  ALBUTerol    90 MICROgram(s) HFA Inhaler 2 Puff(s) Inhalation every 6 hours PRN Shortness of Breath and/or Wheezing  dextrose Oral Gel 15 Gram(s) Oral once PRN Blood Glucose LESS THAN 70 milliGRAM(s)/deciliter  LORazepam   Injectable 1 milliGRAM(s) IV Push every 6 hours PRN Anxiety  sodium chloride 0.9% lock flush 10 milliLiter(s) IV Push every 1 hour PRN Pre/post blood products, medications, blood draw, and to maintain line patency      Diet, NPO with Tube Feed:   Tube Feeding Modality: Gastrostomy  Glucerna 1.5 Horacio  Total Volume for 24 Hours (mL): 1200  Continuous  Starting Tube Feed Rate mL per Hour: 20  Increase Tube Feed Rate by (mL): 10     Every 6 hours  Until Goal Tube Feed Rate (mL per Hour): 60  Tube Feed Duration (in Hours): 20  Tube Feed Start Time: 00:00  Free Water Flush  Pump   Rate (mL per Hour): 30   Frequency: Every Hour    Duration (Hours): 24 (04-22-22 @ 12:49) [Active]          Vital Signs Last 24 Hrs  T(C): 36.9 (29 Apr 2022 04:43), Max: 36.9 (29 Apr 2022 04:43)  T(F): 98.4 (29 Apr 2022 04:43), Max: 98.4 (29 Apr 2022 04:43)  HR: 89 (29 Apr 2022 08:06) (79 - 116)  BP: 129/75 (29 Apr 2022 08:00) (121/70 - 157/78)  BP(mean): 91 (29 Apr 2022 08:00) (86 - 100)  RR: 18 (29 Apr 2022 08:00) (18 - 30)  SpO2: 99% (29 Apr 2022 08:06) (93% - 100%)      04-28-22 @ 07:01  -  04-29-22 @ 07:00  --------------------------------------------------------  IN: 1555 mL / OUT: 2300 mL / NET: -745 mL        Mode: AC/ CMV (Assist Control/ Continuous Mandatory Ventilation), RR (machine): 18, TV (machine): 400, FiO2: 40, PEEP: 5, ITime: 1, MAP: 15, PIP: 28      LABS:                        9.0    6.94  )-----------( 365      ( 29 Apr 2022 06:56 )             29.8     04-29    138  |  105  |  28<H>  ----------------------------<  147<H>  4.6   |  25  |  1.04    Ca    9.0      29 Apr 2022 06:56    TPro  7.0  /  Alb  1.9<L>  /  TBili  0.4  /  DBili  x   /  AST  14  /  ALT  14  /  AlkPhos  169<H>  04-29              WBC:  WBC Count: 6.94 K/uL (04-29 @ 06:56)  WBC Count: 6.44 K/uL (04-28 @ 06:20)  WBC Count: 9.20 K/uL (04-27 @ 08:50)  WBC Count: 10.48 K/uL (04-26 @ 06:39)      MICROBIOLOGY:  RECENT CULTURES:  04-24 .Sputum Sputum Pseudomonas aeruginosa (Carbapenem Resistant)  Serratia marcescens   Few Squamous epithelial cells per low power field  Moderate polymorphonuclear leukocytes per low power field  Moderate Gram Negative Rods per oil power field   Moderate Pseudomonas aeruginosa (Carbapenem Resistant)  Moderate Serratia marcescens  Normal Respiratory Elvira present    04-22 Skin Skin XXXX XXXX   Culture yields growth of greater than 3 colony types of  Call client services within 7 days if further workup is clinically  indicated.                    Sodium:  Sodium, Serum: 138 mmol/L (04-29 @ 06:56)  Sodium, Serum: 135 mmol/L (04-28 @ 06:20)  Sodium, Serum: 138 mmol/L (04-27 @ 06:31)  Sodium, Serum: 140 mmol/L (04-26 @ 06:39)      1.04 mg/dL 04-29 @ 06:56  1.10 mg/dL 04-28 @ 06:20  1.15 mg/dL 04-27 @ 06:31  1.15 mg/dL 04-26 @ 06:39      Hemoglobin:  Hemoglobin: 9.0 g/dL (04-29 @ 06:56)  Hemoglobin: 7.3 g/dL (04-28 @ 06:20)  Hemoglobin: 7.2 g/dL (04-27 @ 08:50)  Hemoglobin: 7.5 g/dL (04-26 @ 06:39)      Platelets: Platelet Count - Automated: 365 K/uL (04-29 @ 06:56)  Platelet Count - Automated: 323 K/uL (04-28 @ 06:20)  Platelet Count - Automated: 287 K/uL (04-27 @ 08:50)  Platelet Count - Automated: 269 K/uL (04-26 @ 06:39)      LIVER FUNCTIONS - ( 29 Apr 2022 06:56 )  Alb: 1.9 g/dL / Pro: 7.0 g/dL / ALK PHOS: 169 U/L / ALT: 14 U/L DA / AST: 14 U/L / GGT: x                 RADIOLOGY & ADDITIONAL STUDIES:      MICROBIOLOGY:  RECENT CULTURES:  04-24 .Sputum Sputum Pseudomonas aeruginosa (Carbapenem Resistant)  Serratia marcescens   Few Squamous epithelial cells per low power field  Moderate polymorphonuclear leukocytes per low power field  Moderate Gram Negative Rods per oil power field   Moderate Pseudomonas aeruginosa (Carbapenem Resistant)  Moderate Serratia marcescens  Normal Respiratory Elvira present    04-22 Skin Skin XXXX XXXX   Culture yields growth of greater than 3 colony types of  Call client services within 7 days if further workup is clinically  indicated.

## 2022-04-29 NOTE — DISCHARGE NOTE NURSING/CASE MANAGEMENT/SOCIAL WORK - NSDCVIVACCINE_GEN_ALL_CORE_FT
COVID-19, mRNA, LNP-S, PF, 30 mcg/0.3 mL dose (Pf[employer]); 20-Dec-2021 16:02; Cat Ramirez); [employer]; [id number] (Exp. Date: 30-Dec-2021); IntraMuscular; Deltoid Left.; 0.3 milliLiter(s);

## 2022-04-29 NOTE — PROGRESS NOTE ADULT - ASSESSMENT
75 y/o F with PMH of HTN, DM type 2, CVA, recent Cardiac Arrest, Chronic Respiratory Failure, Vent Dependant s/p trach & PEG, Anemia with GI blood loss, and Dementia who was sent from [facility] facility for acute respiratory distress, fevers, hypotension. Patient unable to contribute to hx due to mental status.  In the ED: wbc 14.15, HGB 6.0, INR 1.40, sodium 134, cr 1.36, lactate 2.8. UA sig for mod LE, large blood, wbc, bacteria. Given Zosyn and ns.     RECOMMENDATIONS  Cx from 4/21 reviewed:  sputum cx w/ pseudomonas and serratia  urine cx w/ ESBL E Coli but with low PRATIK to zosyn and zosyn concentrates in the urine  leukocytosis resolved; off pressors  vent settings improved from admission  f/u blood cx-NGTD  s/p 7 day course of zosyn  frequency suctioning  discharge planning    Diarrhea  C. diff indeterminate  with resolution of leukocytosis with antibiotics unlikely that patient has C Diff  monitor stool output/consistency    Emil Medellin M.D.  [facility], Division of Infectious Diseases  [phone]  After 5pm on weekdays and all day on weekends - please call [phone]

## 2022-04-29 NOTE — DISCHARGE NOTE PROVIDER - NSDCFUADDINST_GEN_ALL_CORE_FT
- Please make an appointment for follow up with your primary doctor within 1-3 days of discharge  - It is important to see your primary physician as well as the physicians listed below to perform a comprehensive medical review.  Call their offices for an appointment as soon as you leave the hospital.  You will also need to see them for renewal of your medications.  If you do not have a primary physician, contact the [facility] Physician Referral Service at [phone].  To obtain your results, you can access the [facility] Patient Portal at [email or web address].   - Your medical issues appear to be stable at this time, but if your symptoms recur or worsen, contact your physicians and/or return to the hospital if necessary.    -If you encounter any issues or questions with your medication, call your physicians before stopping the medication.    - Limit your diet to 2 grams of sodium daily.  - Patient or caretaker is responsible for making all appointments

## 2022-04-29 NOTE — DISCHARGE NOTE PROVIDER - NSDCCPCAREPLAN_GEN_ALL_CORE_FT
PRINCIPAL DISCHARGE DIAGNOSIS  Diagnosis: Acute UTI  Assessment and Plan of Treatment: Treated with and completed course of Zosyn inpatient. WBC count improved. Return to ED with new or worsening symptoms      SECONDARY DISCHARGE DIAGNOSES  Diagnosis: Anemia  Assessment and Plan of Treatment: Likely due to anemia of chronic disease. Occult negative x2 and CT not suggestive of GI bleed. Evaluated by hematology and will require monitoring of HGB i.e. weekly. May require periodic transfusions as outpatient. Continue multivitamin and folic acid daily. Repeat CBC within 1-3 days of discharge    Diagnosis: Dehydration  Assessment and Plan of Treatment: Initally presented with acute kidney injury which improved with hydration. Continue hydration at [facility] with feeds. Monitor renal function with periodic labs. Repeat BMP within 1-3 days of discharge

## 2022-04-29 NOTE — PROGRESS NOTE ADULT - ASSESSMENT
CHAPINCITO GUIDRY 75 f [facility] S 4/21/2022   DR ILIANA KEMP     REVIEW OF SYMPTOMS      Able to give (reliable) ROS  NO     PHYSICAL EXAM    HEENT Unremarkable  atraumatic   RESP Fair air entry EXP prolonged    Harsh breath sound Resp distres mild   CARDIAC S1 S2 No S3     NO JVD    ABDOMEN SOFT BS PRESENT NOT DISTENDED No hepatosplenomegaly   PEDAL EDEMA present No calf tenderness  NO rash       DOA/CC/PROBLEMS poa .  76 f   from [facility]  doa 4/21/2022  cc fever hypotension    PRESENTING PROBLEMS 4/21 4/23/2022   Sepsis   Lacticemia La 2.8   Anemia Hb 6   RYAN Cr 1.3     PMH-PSH .  Pneumonia  HTN, DM, CVA, dementia, chronic respiratory failure s/p trach, PEG, cardiac arrest      COVID/ICU/CODE STATUS.                       COVID  STATUS.           ICU STAY. none  GOC.      BEST PRACTICE ISSUES.                                                  HEAD OF BED ELEVATION. Yes  DVT PROPHYLAXIS.    4/21 hpsc   INFECTION PROPHYLAXIS.   4/22 chlorhexidine 2%   4/21/2022 chlorhexidine .12%    SQUIRES PROPHYLAXIS.  4/21 protonix    4/21 carafate                                                                                      DIET.  4/22 glucerna 1.5 1200 gt               VITALS/PO/IO/VENT/DRIPS.  4/29/2022 afeb 96 140/100   4/29/2022 ac 18/400/5/.4      PROBLEM DATA/ASSESSMENT RECOMMENDATIONS (A/R).    HEMODYNAMICS.   Monitor bp Target MAP 65 (+)    RESP.   Monitor po Target po 90-95%      PMH/PSH PROBLEMS.  Management continued/modified as indicated    VENT MANAGEMENT.   HOB elevation  Target Pplat 30 (-)  Target PO 90-95%  Target pH 730 (+)  Daily spontaneous breathing trials   Daily sedation vacation         INFECTION SOURCE.   VAP   INFECTION A/R.   4/22 C diff indet   4/21 urine c 100k e coli ESBL  ID Dr BRITTANY Brantley/Dr Medellin on case aware  Is on zosyn      SHOCK.  4/21 norepi  4/22/2022 wened off nore     LACTICEMIA.  La 4/21-4/22/2022 la 2.8 - 1.2     COPD.  4/21 albuterol  4/21 spiriva   under control    ANEMIA.   Hb 4/21-4/22-4/23-4/24-4/25-4/26-4/27-4/28/2022 hb 6 - 8.4 -7.7 - 7.5 - 7.1 - 7.5 - 7.2- 7.3    Monitro serial    TRANSFUSION  4/21 1 u prbc         HYPONATREMIA.  Na 4/21-4/22-4/23-4/24-4/26/2022 na 134- 133-131-133 - 140   4/23/2022 nacl added     RYAN.  Cr 4/21-4/22-4/24/2022 Cr 1.3 - 1.1-1.1      AMS.  ct 4/21 ct head (-)    ANXIETY.  4/23/2022 lorazepam 1.4p  4/21 lorazepam 1.6     SPASMS   4/21 methocarbamol 500.2     TIME SPENT   Over 36 minutes aggregate critical care time spent on encounter; activities included   direct patient care, counseling and/or coordinating care reviewing notes, lab data/ imaging , discussion with multidisciplinary team/ patient  /family and explaining in detail risks, benefits, alternatives  of the recommendations     CHAPINCITO GUIDRY 75 f [facility] S 4/21/2022   DR ILIANA KEMP

## 2022-04-29 NOTE — DISCHARGE NOTE PROVIDER - HOSPITAL COURSE
76F Chronic Respiratory Failure, HTN, DM2, Dementia, admitted for Septic Shock.     Acute on Chronic Hypoxic Respiratory Failure /  Septic Shock   Imaging shows inflitrates and pleural effusions; UC showing ESBL UTI; C. Diff studies are indeterminate  Off Pressors and hemodynamics improved; On Vent via Trach   Arterial Line removed  Discussed with ID, completed course of abx    Acute Blood Loss Anemia   S/P 1U PRBC Transfusion and started on Folic Acid   Has Anemia of Chronic Disease   Hematology consult noted   Gave additional unit 4/28 with appropriate response    Acute Renal Failure  Improved and from sepsis   Monitor BMP and Electrolytes     Diabetes type II  Resume home meds on dc    Quadriplegia  c/w nursing care for all ADLs    COPD  c/w inhalers   pulm (Jaime), recs appreciated    GERD  c/w carafate and PPI     Disposition  Discharge to [facility]

## 2022-04-29 NOTE — PROGRESS NOTE ADULT - SUBJECTIVE AND OBJECTIVE BOX
INTERVAL HPI/OVERNIGHT EVENTS:  No new overnight event.  No N/V/D.  Tolerating diet.    Allergies    codeine (Hives)    Intolerances          General:  No wt loss, fevers, chills, night sweats, fatigue,   Eyes:  Good vision, no reported pain  ENT:  No sore throat, pain, runny nose, dysphagia  CV:  No pain, palpitations, hypo/hypertension  Resp:  No dyspnea, cough, tachypnea, wheezing  GI:  No pain, No nausea, No vomiting, No diarrhea, No constipation, No weight loss, No fever, No pruritis, No rectal bleeding, No tarry stools, No dysphagia,  :  No pain, bleeding, incontinence, nocturia  Muscle:  No pain, weakness  Neuro:  No weakness, tingling, memory problems  Psych:  No fatigue, insomnia, mood problems, depression  Endocrine:  No polyuria, polydipsia, cold/heat intolerance  Heme:  No petechiae, ecchymosis, easy bruisability  Skin:  No rash, tattoos, scars, edema      PHYSICAL EXAM:   Vital Signs:  Vital Signs Last 24 Hrs  T(C): 36.9 (2022 04:43), Max: 36.9 (2022 04:43)  T(F): 98.4 (2022 04:43), Max: 98.4 (2022 04:43)  HR: 89 (2022 08:06) (79 - 116)  BP: 129/75 (2022 08:00) (121/70 - 157/78)  BP(mean): 91 (2022 08:00) (86 - 100)  RR: 18 (2022 08:00) (18 - 30)  SpO2: 99% (2022 08:06) (93% - 100%)  Daily     Daily Weight in k (2022 07:28)I&O's Summary    2022 07:01  -  2022 07:00  --------------------------------------------------------  IN: 1555 mL / OUT: 2300 mL / NET: -745 mL        GENERAL:  Appears stated age, well-groomed, well-nourished, no distress  HEENT:  NC/AT,  conjunctivae clear and pink, no thyromegaly, nodules, adenopathy, no JVD, sclera -anicteric  CHEST:  Full & symmetric excursion, no increased effort, breath sounds clear  HEART:  Regular rhythm, S1, S2, no murmur/rub/S3/S4, no abdominal bruit, no edema  ABDOMEN:  Soft, non-tender, non-distended, normoactive bowel sounds,  no masses ,no hepato-splenomegaly, no signs of chronic liver disease  EXTEREMITIES:  no cyanosis,clubbing or edema  SKIN:  No rash/erythema/ecchymoses/petechiae/wounds/abscess/warm/dry  NEURO:  Alert, oriented, no asterixis, no tremor, no encephalopathy      LABS:                        9.0    6.94  )-----------( 365      ( 2022 06:56 )             29.8         138  |  105  |  28<H>  ----------------------------<  147<H>  4.6   |  25  |  1.04    Ca    9.0      2022 06:56    TPro  7.0  /  Alb  1.9<L>  /  TBili  0.4  /  DBili  x   /  AST  14  /  ALT  14  /  AlkPhos  169<H>          amylase   lipase  RADIOLOGY & ADDITIONAL TESTS:

## 2022-04-29 NOTE — PROGRESS NOTE ADULT - ASSESSMENT
77 y/o F with PMH of HTN, DM type 2, CVA, recent Cardiac Arrest, Chronic Respiratory Failure, Vent Dependant s/p trach & PEG, Anemia with GI blood loss, and Dementia who was sent from [facility] facility for acute respiratory distress, fevers, hypotension.    sepsis  hypotension  OP  OA  chr resp failure  cva  hx of card arrest  DM    Ativan dosing and scheduling in progress - monitor for agitation -   eventual dc to [facility] vent unit    s/p ABX  cx - biomarkers  ct imaging reviewed  labs reviewed  assist with needs    with HCP   pt is full code  old records reviewed  GOC documented

## 2022-04-29 NOTE — DISCHARGE NOTE PROVIDER - CARE PROVIDER_API CALL
Paul Merrill  INTERNAL MEDICINE  [address] [place] [address], [address]  [place] [address]  Phone: [phone]  Fax: [phone]  Follow Up Time: 1-3 days

## 2022-04-29 NOTE — DISCHARGE NOTE NURSING/CASE MANAGEMENT/SOCIAL WORK - PATIENT PORTAL LINK FT
You can access the FollowMyHealth Patient Portal offered by [facility] by registering at the following website: [email or web address]. By joining [employer]’s FollowMyHealth portal, you will also be able to view your health information using other applications (apps) compatible with our system.

## 2022-04-29 NOTE — PROGRESS NOTE ADULT - REASON FOR ADMISSION
Anemia
acute on chronic hypoxic respiratory failure, septic shock likely due to gram negative PNA and/or CAUTI, also with severe anemia

## 2022-04-29 NOTE — PROGRESS NOTE ADULT - SUBJECTIVE AND OBJECTIVE BOX
Date/Time Patient Seen:  		  Referring MD:   Data Reviewed	       Patient is a 76y old  Female who presents with a chief complaint of Anemia (28 Apr 2022 11:06)      Subjective/HPI     PAST MEDICAL & SURGICAL HISTORY:  Dementia of frontal lobe type    Aphasic stroke    Diabetes mellitus    Respiratory failure    Hypertension    GERD (gastroesophageal reflux disease)    Constipation    Respiratory failure    CVA (cerebral vascular accident)    HTN (hypertension)    DM (diabetes mellitus)    Advanced dementia    COVID-19 virus detected    Quadriplegia    Pneumonia    Type II diabetes mellitus    Hx of appendectomy    Gastrostomy in place    Tracheostomy in place    Tracheostomy tube present    Feeding by G-tube          Medication list         MEDICATIONS  (STANDING):  chlorhexidine 0.12% Liquid 15 milliLiter(s) Oral Mucosa every 12 hours  chlorhexidine 2% Cloths 1 Application(s) Topical daily  collagenase Ointment 1 Application(s) Topical daily  dextrose 5%. 1000 milliLiter(s) (100 mL/Hr) IV Continuous <Continuous>  dextrose 5%. 1000 milliLiter(s) (50 mL/Hr) IV Continuous <Continuous>  dextrose 50% Injectable 25 Gram(s) IV Push once  dextrose 50% Injectable 12.5 Gram(s) IV Push once  dextrose 50% Injectable 25 Gram(s) IV Push once  folic acid 1 milliGRAM(s) Oral daily  glucagon  Injectable 1 milliGRAM(s) IntraMuscular once  heparin   Injectable 5000 Unit(s) SubCutaneous every 12 hours  insulin glargine Injectable (LANTUS) 10 Unit(s) SubCutaneous every morning  insulin lispro (ADMELOG) corrective regimen sliding scale   SubCutaneous every 6 hours  lactobacillus acidophilus 1 Tablet(s) Oral two times a day  LORazepam     Tablet 1 milliGRAM(s) Oral every 4 hours  methocarbamol 500 milliGRAM(s) Oral two times a day  mineral oil/petrolatum Hydrophilic Ointment 1 Application(s) Topical daily  multivitamin 1 Tablet(s) Oral daily  pantoprazole  Injectable 40 milliGRAM(s) IV Push daily  polyethylene glycol 3350 17 Gram(s) Oral two times a day  povidone iodine 10% Ointment 1 Application(s) Topical every 12 hours  senna 2 Tablet(s) Oral at bedtime  simethicone 80 milliGRAM(s) Chew every 6 hours  sucralfate 1 Gram(s) Oral four times a day    MEDICATIONS  (PRN):  acetaminophen     Tablet .. 650 milliGRAM(s) Oral every 6 hours PRN Temp greater or equal to 38C (100.4F)  ALBUTerol    90 MICROgram(s) HFA Inhaler 2 Puff(s) Inhalation every 6 hours PRN Shortness of Breath and/or Wheezing  dextrose Oral Gel 15 Gram(s) Oral once PRN Blood Glucose LESS THAN 70 milliGRAM(s)/deciliter  LORazepam   Injectable 1 milliGRAM(s) IV Push every 6 hours PRN Anxiety  sodium chloride 0.9% lock flush 10 milliLiter(s) IV Push every 1 hour PRN Pre/post blood products, medications, blood draw, and to maintain line patency         Vitals log        ICU Vital Signs Last 24 Hrs  T(C): 36.8 (29 Apr 2022 00:28), Max: 36.8 (29 Apr 2022 00:28)  T(F): 98.3 (29 Apr 2022 00:28), Max: 98.3 (29 Apr 2022 00:28)  HR: 87 (29 Apr 2022 04:10) (81 - 116)  BP: 135/73 (28 Apr 2022 18:00) (123/70 - 157/78)  BP(mean): 92 (28 Apr 2022 18:00) (86 - 100)  ABP: --  ABP(mean): --  RR: 19 (28 Apr 2022 18:00) (18 - 28)  SpO2: 100% (29 Apr 2022 04:10) (93% - 100%)       Mode: AC/ CMV (Assist Control/ Continuous Mandatory Ventilation)  RR (machine): 18  TV (machine): 400  FiO2: 40  PEEP: 5  ITime: 1  MAP: 12  PIP: 29      Input and Output:  I&O's Detail    27 Apr 2022 07:01  -  28 Apr 2022 07:00  --------------------------------------------------------  IN:    Enteral Tube Flush: 540 mL    Glucerna 1.5: 1140 mL    IV PiggyBack: 200 mL  Total IN: 1880 mL    OUT:    Indwelling Catheter - Urethral (mL): 240 mL    Rectal Tube (mL): 200 mL    Voided (mL): 450 mL  Total OUT: 890 mL    Total NET: 990 mL      28 Apr 2022 07:01  -  29 Apr 2022 06:24  --------------------------------------------------------  IN:    Enteral Tube Flush: 130 mL    Glucerna 1.5: 120 mL    PRBCs (Packed Red Blood Cells): 85 mL  Total IN: 335 mL    OUT:  Total OUT: 0 mL    Total NET: 335 mL          Lab Data                        7.3    6.44  )-----------( 323      ( 28 Apr 2022 06:20 )             25.0     04-28    135  |  102  |  31<H>  ----------------------------<  186<H>  4.4   |  26  |  1.10    Ca    8.9      28 Apr 2022 06:20    TPro  6.7  /  Alb  1.8<L>  /  TBili  0.2  /  DBili  x   /  AST  12  /  ALT  16  /  AlkPhos  173<H>  04-28            Review of Systems	      Objective     Physical Examination    heart s1s2  lung dec BS  abd soft      Pertinent Lab findings & Imaging      Annalise:  NO   Adequate UO     I&O's Detail    27 Apr 2022 07:01  -  28 Apr 2022 07:00  --------------------------------------------------------  IN:    Enteral Tube Flush: 540 mL    Glucerna 1.5: 1140 mL    IV PiggyBack: 200 mL  Total IN: 1880 mL    OUT:    Indwelling Catheter - Urethral (mL): 240 mL    Rectal Tube (mL): 200 mL    Voided (mL): 450 mL  Total OUT: 890 mL    Total NET: 990 mL      28 Apr 2022 07:01  -  29 Apr 2022 06:24  --------------------------------------------------------  IN:    Enteral Tube Flush: 130 mL    Glucerna 1.5: 120 mL    PRBCs (Packed Red Blood Cells): 85 mL  Total IN: 335 mL    OUT:  Total OUT: 0 mL    Total NET: 335 mL               Discussed with:     Cultures:	        Radiology

## 2022-05-05 ENCOUNTER — TRANSCRIPTION ENCOUNTER (OUTPATIENT)
Age: 77
End: 2022-05-05

## 2022-05-13 ENCOUNTER — EMERGENCY (EMERGENCY)
Facility: HOSPITAL | Age: 77
LOS: 1 days | Discharge: NURSING, REHAB OR LONG-TERM CARE | End: 2022-05-13
Attending: EMERGENCY MEDICINE | Admitting: EMERGENCY MEDICINE
Payer: MEDICARE

## 2022-05-13 VITALS
SYSTOLIC BLOOD PRESSURE: 157 MMHG | OXYGEN SATURATION: 100 % | WEIGHT: 117.95 LBS | HEIGHT: 65 IN | DIASTOLIC BLOOD PRESSURE: 72 MMHG | HEART RATE: 100 BPM | RESPIRATION RATE: 20 BRPM | TEMPERATURE: 99 F

## 2022-05-13 VITALS
DIASTOLIC BLOOD PRESSURE: 53 MMHG | SYSTOLIC BLOOD PRESSURE: 110 MMHG | OXYGEN SATURATION: 99 % | HEART RATE: 80 BPM | TEMPERATURE: 98 F | RESPIRATION RATE: 21 BRPM

## 2022-05-13 DIAGNOSIS — Z93.0 TRACHEOSTOMY STATUS: Chronic | ICD-10-CM

## 2022-05-13 DIAGNOSIS — Z93.1 GASTROSTOMY STATUS: Chronic | ICD-10-CM

## 2022-05-13 PROCEDURE — 71045 X-RAY EXAM CHEST 1 VIEW: CPT | Mod: 26

## 2022-05-13 PROCEDURE — 99284 EMERGENCY DEPT VISIT MOD MDM: CPT

## 2022-05-13 PROCEDURE — 94640 AIRWAY INHALATION TREATMENT: CPT

## 2022-05-13 PROCEDURE — 96372 THER/PROPH/DIAG INJ SC/IM: CPT

## 2022-05-13 PROCEDURE — 71045 X-RAY EXAM CHEST 1 VIEW: CPT

## 2022-05-13 PROCEDURE — 99285 EMERGENCY DEPT VISIT HI MDM: CPT | Mod: 25

## 2022-05-13 RX ORDER — IPRATROPIUM/ALBUTEROL SULFATE 18-103MCG
3 AEROSOL WITH ADAPTER (GRAM) INHALATION ONCE
Refills: 0 | Status: COMPLETED | OUTPATIENT
Start: 2022-05-13 | End: 2022-05-13

## 2022-05-13 RX ADMIN — Medication 3 MILLILITER(S): at 21:12

## 2022-05-13 RX ADMIN — Medication 2 MILLIGRAM(S): at 23:33

## 2022-05-13 RX ADMIN — Medication 3 MILLILITER(S): at 21:11

## 2022-05-13 NOTE — ED PROVIDER NOTE - OBJECTIVE STATEMENT
75yo female bib ems from sob for the last 5 days, tonight was found to be sob, no fever, no cough or vomiting, pt is trached so hx is limited

## 2022-05-13 NOTE — ED ADULT NURSE NOTE - OBJECTIVE STATEMENT
77yo female BIBA, as per ems  "she was sent here [facility] for difficulty breathing". pt noted with poor hygiene, and make efforts to breathe from her mouth. pt doesn't appear to be in any distress. "she always like this" as per . pt is trach to vent and required suction.

## 2022-05-13 NOTE — ED PROVIDER NOTE - PATIENT PORTAL LINK FT
You can access the FollowMyHealth Patient Portal offered by [facility] by registering at the following website: [email or web address]. By joining [employer]’s FollowMyHealth portal, you will also be able to view your health information using other applications (apps) compatible with our system.

## 2022-05-13 NOTE — ED PROVIDER NOTE - NSFOLLOWUPINSTRUCTIONS_ED_ALL_ED_FT
Dyspnea    WHAT YOU NEED TO KNOW:    Dyspnea is breathing difficulty or discomfort. You may have labored, painful, or shallow breathing. You may feel breathless or short of breath. Dyspnea can occur during rest or with activity. You may have dyspnea for a short time, or it might become chronic. Dyspnea is often a symptom of a disease or condition.    DISCHARGE INSTRUCTIONS:    Return to the emergency department if:   •Your signs and symptoms are the same or worse within 24 hours of treatment.       •You have shaking chills or a fever over 102°F.       •You have new pain, pressure, or tightness in your chest.       •You have a new or worse cough or wheezing, or you cough up blood.      •You feel like you cannot get enough air.      •The skin over your ribs or on your neck sinks in when you breathe.       •You have a severe headache with vomiting and abdominal pain.       •You feel confused or dizzy.      Call your doctor or specialist if:   •You have questions or concerns about your condition or care.          Medicines:    •Medicines may be used to treat the cause of your dyspnea. Medicines may reduce swelling in your airway or decrease extra fluid from around your heart or lungs. Other medicines may be used to decrease anxiety and help you feel calm and relaxed.      •Take your medicine as directed. Contact your healthcare provider if you think your medicine is not helping or if you have side effects. Tell him or her if you are allergic to any medicine. Keep a list of the medicines, vitamins, and herbs you take. Include the amounts, and when and why you take them. Bring the list or the pill bottles to follow-up visits. Carry your medicine list with you in case of an emergency.      Manage long-term dyspnea:   •Create an action plan. You and your healthcare provider can work together to create a plan for how to handle episodes of dyspnea. The plan can include daily activities, treatment changes, and what to do if you have severe breathing problems.      •Lean forward on your elbows when you sit. This helps your lungs expand and may make it easier to breathe.      •Use pursed-lip breathing any time you feel short of breath. Breathe in through your nose and then slowly breathe out through your mouth with your lips slightly puckered. It should take you twice as long to breathe out as it did to breathe in.  Breathe in Breathe out           •Do not smoke. Nicotine and other chemicals in cigarettes and cigars can cause lung damage and make it harder to breathe. Ask your healthcare provider for information if you currently smoke and need help to quit. E-cigarettes or smokeless tobacco still contain nicotine. Talk to your healthcare provider before you use these products.       •Reach or maintain a healthy weight. Your healthcare provider can help you create a safe weight loss plan if you are overweight.      •Exercise as directed. Exercise can help your lungs work more easily. Exercise can also help you lose weight if needed. Try to get at least 30 minutes of exercise most days of the week. Your healthcare provider can help you create an exercise plan that is safe for you.      Follow up with your doctor or specialist as directed: Write down your questions so you remember to ask them during your visits.

## 2022-05-13 NOTE — ED CLERICAL - NS ED CLERK NOTE PRE-ARRIVAL INFORMATION; ADDITIONAL PRE-ARRIVAL INFORMATION
This patient is enrolled in a readmission reduction initiative and has active care navigation. This patient can be followed up by the care navigation team within 24 hours.  To arrange close follow-up or to obtain additional clinical information, please call the care navigation contact number above.

## 2022-05-16 NOTE — ED ADULT NURSE REASSESSMENT NOTE - NS ED NURSE REASSESS COMMENT FT1
ICU PA Santpal bedside evaluating patient. As per ICU PA, patient may keep fentanyl patch on at this time. 38yo M no pmh here after being exposed to bloody sputum of person he believes may be HIV positive; interested in PEP.  Explained low risk nature of exposure to pt, who verbalized understanding.  pt would like to start PEP anyway as he is in the window now.    Will get basic labs, HIV/ hepatitis panel, start PEP.  Rx for truvada/ isentress sent to pt's pharmacy.

## 2022-05-21 ENCOUNTER — INPATIENT (INPATIENT)
Facility: HOSPITAL | Age: 77
LOS: 10 days | Discharge: NURSING, REHAB OR LONG-TERM CARE | DRG: 870 | End: 2022-06-01
Attending: INTERNAL MEDICINE | Admitting: INTERNAL MEDICINE
Payer: MEDICARE

## 2022-05-21 VITALS
TEMPERATURE: 100 F | HEART RATE: 88 BPM | WEIGHT: 117.95 LBS | HEIGHT: 65 IN | SYSTOLIC BLOOD PRESSURE: 108 MMHG | DIASTOLIC BLOOD PRESSURE: 56 MMHG | RESPIRATION RATE: 36 BRPM | OXYGEN SATURATION: 87 %

## 2022-05-21 DIAGNOSIS — Z93.0 TRACHEOSTOMY STATUS: Chronic | ICD-10-CM

## 2022-05-21 DIAGNOSIS — Z93.1 GASTROSTOMY STATUS: Chronic | ICD-10-CM

## 2022-05-21 DIAGNOSIS — J18.9 PNEUMONIA, UNSPECIFIED ORGANISM: ICD-10-CM

## 2022-05-21 LAB
ALBUMIN SERPL ELPH-MCNC: 2 G/DL — LOW (ref 3.3–5)
ALBUMIN SERPL ELPH-MCNC: 2.4 G/DL — LOW (ref 3.3–5)
ALP SERPL-CCNC: 132 U/L — HIGH (ref 30–120)
ALP SERPL-CCNC: 146 U/L — HIGH (ref 30–120)
ALT FLD-CCNC: 17 U/L DA — SIGNIFICANT CHANGE UP (ref 10–60)
ALT FLD-CCNC: 17 U/L DA — SIGNIFICANT CHANGE UP (ref 10–60)
ANION GAP SERPL CALC-SCNC: 7 MMOL/L — SIGNIFICANT CHANGE UP (ref 5–17)
ANION GAP SERPL CALC-SCNC: 8 MMOL/L — SIGNIFICANT CHANGE UP (ref 5–17)
APPEARANCE UR: CLEAR — SIGNIFICANT CHANGE UP
APTT BLD: 37.4 SEC — HIGH (ref 27.5–35.5)
AST SERPL-CCNC: 20 U/L — SIGNIFICANT CHANGE UP (ref 10–40)
AST SERPL-CCNC: 26 U/L — SIGNIFICANT CHANGE UP (ref 10–40)
BASE EXCESS BLDA CALC-SCNC: 5.3 MMOL/L — HIGH (ref -2–3)
BASOPHILS # BLD AUTO: 0 K/UL — SIGNIFICANT CHANGE UP (ref 0–0.2)
BASOPHILS # BLD AUTO: 0.07 K/UL — SIGNIFICANT CHANGE UP (ref 0–0.2)
BASOPHILS NFR BLD AUTO: 0 % — SIGNIFICANT CHANGE UP (ref 0–2)
BASOPHILS NFR BLD AUTO: 0.3 % — SIGNIFICANT CHANGE UP (ref 0–2)
BILIRUB SERPL-MCNC: 0.5 MG/DL — SIGNIFICANT CHANGE UP (ref 0.2–1.2)
BILIRUB SERPL-MCNC: 0.7 MG/DL — SIGNIFICANT CHANGE UP (ref 0.2–1.2)
BILIRUB UR-MCNC: NEGATIVE — SIGNIFICANT CHANGE UP
BUN SERPL-MCNC: 65 MG/DL — HIGH (ref 7–23)
BUN SERPL-MCNC: 68 MG/DL — HIGH (ref 7–23)
CALCIUM SERPL-MCNC: 8.3 MG/DL — LOW (ref 8.4–10.5)
CALCIUM SERPL-MCNC: 9.1 MG/DL — SIGNIFICANT CHANGE UP (ref 8.4–10.5)
CHLORIDE SERPL-SCNC: 100 MMOL/L — SIGNIFICANT CHANGE UP (ref 96–108)
CHLORIDE SERPL-SCNC: 102 MMOL/L — SIGNIFICANT CHANGE UP (ref 96–108)
CO2 SERPL-SCNC: 30 MMOL/L — SIGNIFICANT CHANGE UP (ref 22–31)
CO2 SERPL-SCNC: 34 MMOL/L — HIGH (ref 22–31)
COLOR SPEC: YELLOW — SIGNIFICANT CHANGE UP
CREAT SERPL-MCNC: 1.62 MG/DL — HIGH (ref 0.5–1.3)
CREAT SERPL-MCNC: 1.65 MG/DL — HIGH (ref 0.5–1.3)
DIFF PNL FLD: ABNORMAL
EGFR: 32 ML/MIN/1.73M2 — LOW
EGFR: 32 ML/MIN/1.73M2 — LOW
EOSINOPHIL # BLD AUTO: 0 K/UL — SIGNIFICANT CHANGE UP (ref 0–0.5)
EOSINOPHIL # BLD AUTO: 0.09 K/UL — SIGNIFICANT CHANGE UP (ref 0–0.5)
EOSINOPHIL NFR BLD AUTO: 0 % — SIGNIFICANT CHANGE UP (ref 0–6)
EOSINOPHIL NFR BLD AUTO: 0.4 % — SIGNIFICANT CHANGE UP (ref 0–6)
EPI CELLS # UR: SIGNIFICANT CHANGE UP
GLUCOSE SERPL-MCNC: 267 MG/DL — HIGH (ref 70–99)
GLUCOSE SERPL-MCNC: 277 MG/DL — HIGH (ref 70–99)
GLUCOSE UR QL: NEGATIVE MG/DL — SIGNIFICANT CHANGE UP
HCO3 BLDA-SCNC: 30 MMOL/L — HIGH (ref 21–28)
HCT VFR BLD CALC: 28.8 % — LOW (ref 34.5–45)
HCT VFR BLD CALC: 29.3 % — LOW (ref 34.5–45)
HGB BLD-MCNC: 8 G/DL — LOW (ref 11.5–15.5)
HGB BLD-MCNC: 8.1 G/DL — LOW (ref 11.5–15.5)
HOROWITZ INDEX BLDA+IHG-RTO: 100 — SIGNIFICANT CHANGE UP
HYPOCHROMIA BLD QL: SIGNIFICANT CHANGE UP
IMM GRANULOCYTES NFR BLD AUTO: 0.9 % — SIGNIFICANT CHANGE UP (ref 0–1.5)
INR BLD: 1.17 RATIO — HIGH (ref 0.88–1.16)
KETONES UR-MCNC: NEGATIVE — SIGNIFICANT CHANGE UP
LACTATE SERPL-SCNC: 1.9 MMOL/L — SIGNIFICANT CHANGE UP (ref 0.7–2)
LEUKOCYTE ESTERASE UR-ACNC: ABNORMAL
LYMPHOCYTES # BLD AUTO: 0.82 K/UL — LOW (ref 1–3.3)
LYMPHOCYTES # BLD AUTO: 1.84 K/UL — SIGNIFICANT CHANGE UP (ref 1–3.3)
LYMPHOCYTES # BLD AUTO: 4 % — LOW (ref 13–44)
LYMPHOCYTES # BLD AUTO: 8 % — LOW (ref 13–44)
MANUAL SMEAR VERIFICATION: SIGNIFICANT CHANGE UP
MCHC RBC-ENTMCNC: 24.5 PG — LOW (ref 27–34)
MCHC RBC-ENTMCNC: 24.8 PG — LOW (ref 27–34)
MCHC RBC-ENTMCNC: 27.3 GM/DL — LOW (ref 32–36)
MCHC RBC-ENTMCNC: 28.1 GM/DL — LOW (ref 32–36)
MCV RBC AUTO: 88.1 FL — SIGNIFICANT CHANGE UP (ref 80–100)
MCV RBC AUTO: 89.9 FL — SIGNIFICANT CHANGE UP (ref 80–100)
MONOCYTES # BLD AUTO: 0.46 K/UL — SIGNIFICANT CHANGE UP (ref 0–0.9)
MONOCYTES # BLD AUTO: 0.9 K/UL — SIGNIFICANT CHANGE UP (ref 0–0.9)
MONOCYTES NFR BLD AUTO: 2 % — SIGNIFICANT CHANGE UP (ref 2–14)
MONOCYTES NFR BLD AUTO: 4.4 % — SIGNIFICANT CHANGE UP (ref 2–14)
NEUTROPHILS # BLD AUTO: 18.42 K/UL — HIGH (ref 1.8–7.4)
NEUTROPHILS # BLD AUTO: 20.65 K/UL — HIGH (ref 1.8–7.4)
NEUTROPHILS NFR BLD AUTO: 77 % — SIGNIFICANT CHANGE UP (ref 43–77)
NEUTROPHILS NFR BLD AUTO: 90 % — HIGH (ref 43–77)
NEUTS BAND # BLD: 13 % — HIGH (ref 0–8)
NITRITE UR-MCNC: NEGATIVE — SIGNIFICANT CHANGE UP
NRBC # BLD: 0 /100 WBCS — SIGNIFICANT CHANGE UP (ref 0–0)
NRBC # BLD: 0 — SIGNIFICANT CHANGE UP
NRBC # BLD: SIGNIFICANT CHANGE UP /100 WBCS (ref 0–0)
NT-PROBNP SERPL-SCNC: HIGH PG/ML (ref 0–450)
PCO2 BLDA: 50 MMHG — HIGH (ref 32–35)
PH BLDA: 7.39 — SIGNIFICANT CHANGE UP (ref 7.35–7.45)
PH UR: 5 — SIGNIFICANT CHANGE UP (ref 5–8)
PLAT MORPH BLD: NORMAL — SIGNIFICANT CHANGE UP
PLATELET # BLD AUTO: 331 K/UL — SIGNIFICANT CHANGE UP (ref 150–400)
PLATELET # BLD AUTO: 383 K/UL — SIGNIFICANT CHANGE UP (ref 150–400)
PO2 BLDA: 48 MMHG — CRITICAL LOW (ref 83–108)
POTASSIUM SERPL-MCNC: 4.8 MMOL/L — SIGNIFICANT CHANGE UP (ref 3.5–5.3)
POTASSIUM SERPL-MCNC: 4.8 MMOL/L — SIGNIFICANT CHANGE UP (ref 3.5–5.3)
POTASSIUM SERPL-SCNC: 4.8 MMOL/L — SIGNIFICANT CHANGE UP (ref 3.5–5.3)
POTASSIUM SERPL-SCNC: 4.8 MMOL/L — SIGNIFICANT CHANGE UP (ref 3.5–5.3)
PROT SERPL-MCNC: 6.6 G/DL — SIGNIFICANT CHANGE UP (ref 6–8.3)
PROT SERPL-MCNC: 7.7 G/DL — SIGNIFICANT CHANGE UP (ref 6–8.3)
PROT UR-MCNC: 100 MG/DL
PROTHROM AB SERPL-ACNC: 13.8 SEC — HIGH (ref 10.5–13.4)
RAPID RVP RESULT: SIGNIFICANT CHANGE UP
RBC # BLD: 3.26 M/UL — LOW (ref 3.8–5.2)
RBC # BLD: 3.27 M/UL — LOW (ref 3.8–5.2)
RBC # FLD: 21.7 % — HIGH (ref 10.3–14.5)
RBC # FLD: 22 % — HIGH (ref 10.3–14.5)
RBC BLD AUTO: NORMAL — SIGNIFICANT CHANGE UP
RBC CASTS # UR COMP ASSIST: SIGNIFICANT CHANGE UP /HPF (ref 0–4)
SAO2 % BLDA: 81.3 % — LOW (ref 94–98)
SARS-COV-2 RNA SPEC QL NAA+PROBE: SIGNIFICANT CHANGE UP
SODIUM SERPL-SCNC: 140 MMOL/L — SIGNIFICANT CHANGE UP (ref 135–145)
SODIUM SERPL-SCNC: 141 MMOL/L — SIGNIFICANT CHANGE UP (ref 135–145)
SP GR SPEC: 1.01 — SIGNIFICANT CHANGE UP (ref 1.01–1.02)
TROPONIN I, HIGH SENSITIVITY RESULT: 32.6 NG/L — SIGNIFICANT CHANGE UP
UROBILINOGEN FLD QL: NEGATIVE MG/DL — SIGNIFICANT CHANGE UP
WBC # BLD: 20.49 K/UL — HIGH (ref 3.8–10.5)
WBC # BLD: 22.94 K/UL — HIGH (ref 3.8–10.5)
WBC # FLD AUTO: 20.49 K/UL — HIGH (ref 3.8–10.5)
WBC # FLD AUTO: 22.94 K/UL — HIGH (ref 3.8–10.5)
WBC UR QL: SIGNIFICANT CHANGE UP

## 2022-05-21 PROCEDURE — 71250 CT THORAX DX C-: CPT | Mod: 26

## 2022-05-21 PROCEDURE — 93010 ELECTROCARDIOGRAM REPORT: CPT

## 2022-05-21 PROCEDURE — 71045 X-RAY EXAM CHEST 1 VIEW: CPT | Mod: 26

## 2022-05-21 PROCEDURE — 99285 EMERGENCY DEPT VISIT HI MDM: CPT | Mod: CS

## 2022-05-21 PROCEDURE — 99223 1ST HOSP IP/OBS HIGH 75: CPT | Mod: AI

## 2022-05-21 RX ORDER — PIPERACILLIN AND TAZOBACTAM 4; .5 G/20ML; G/20ML
3.38 INJECTION, POWDER, LYOPHILIZED, FOR SOLUTION INTRAVENOUS ONCE
Refills: 0 | Status: COMPLETED | OUTPATIENT
Start: 2022-05-21 | End: 2022-05-21

## 2022-05-21 RX ORDER — MEROPENEM 1 G/30ML
500 INJECTION INTRAVENOUS EVERY 12 HOURS
Refills: 0 | Status: DISCONTINUED | OUTPATIENT
Start: 2022-05-21 | End: 2022-05-22

## 2022-05-21 RX ORDER — CISATRACURIUM BESYLATE 2 MG/ML
10 INJECTION INTRAVENOUS ONCE
Refills: 0 | Status: DISCONTINUED | OUTPATIENT
Start: 2022-05-21 | End: 2022-05-22

## 2022-05-21 RX ORDER — IOHEXOL 300 MG/ML
30 INJECTION, SOLUTION INTRAVENOUS ONCE
Refills: 0 | Status: DISCONTINUED | OUTPATIENT
Start: 2022-05-21 | End: 2022-05-21

## 2022-05-21 RX ORDER — PANTOPRAZOLE SODIUM 20 MG/1
20 TABLET, DELAYED RELEASE ORAL
Qty: 0 | Refills: 0 | DISCHARGE

## 2022-05-21 RX ORDER — VANCOMYCIN HCL 1 G
750 VIAL (EA) INTRAVENOUS ONCE
Refills: 0 | Status: COMPLETED | OUTPATIENT
Start: 2022-05-21 | End: 2022-05-21

## 2022-05-21 RX ORDER — SODIUM CHLORIDE 9 MG/ML
1000 INJECTION INTRAMUSCULAR; INTRAVENOUS; SUBCUTANEOUS ONCE
Refills: 0 | Status: COMPLETED | OUTPATIENT
Start: 2022-05-21 | End: 2022-05-21

## 2022-05-21 RX ORDER — FENTANYL CITRATE 50 UG/ML
0.5 INJECTION INTRAVENOUS
Qty: 2500 | Refills: 0 | Status: DISCONTINUED | OUTPATIENT
Start: 2022-05-21 | End: 2022-05-22

## 2022-05-21 RX ORDER — FAMOTIDINE 10 MG/ML
20 INJECTION INTRAVENOUS ONCE
Refills: 0 | Status: COMPLETED | OUTPATIENT
Start: 2022-05-21 | End: 2022-05-21

## 2022-05-21 RX ORDER — CISATRACURIUM BESYLATE 2 MG/ML
3 INJECTION INTRAVENOUS
Qty: 200 | Refills: 0 | Status: DISCONTINUED | OUTPATIENT
Start: 2022-05-21 | End: 2022-05-22

## 2022-05-21 RX ORDER — PROPOFOL 10 MG/ML
10 INJECTION, EMULSION INTRAVENOUS
Qty: 1000 | Refills: 0 | Status: DISCONTINUED | OUTPATIENT
Start: 2022-05-21 | End: 2022-05-22

## 2022-05-21 RX ORDER — SODIUM CHLORIDE 9 MG/ML
1650 INJECTION INTRAMUSCULAR; INTRAVENOUS; SUBCUTANEOUS ONCE
Refills: 0 | Status: COMPLETED | OUTPATIENT
Start: 2022-05-21 | End: 2022-05-21

## 2022-05-21 RX ORDER — CHLORHEXIDINE GLUCONATE 213 G/1000ML
1 SOLUTION TOPICAL
Refills: 0 | Status: DISCONTINUED | OUTPATIENT
Start: 2022-05-21 | End: 2022-06-01

## 2022-05-21 RX ORDER — LINEZOLID 600 MG/300ML
600 INJECTION, SOLUTION INTRAVENOUS EVERY 12 HOURS
Refills: 0 | Status: DISCONTINUED | OUTPATIENT
Start: 2022-05-21 | End: 2022-05-26

## 2022-05-21 RX ORDER — NOREPINEPHRINE BITARTRATE/D5W 8 MG/250ML
0.05 PLASTIC BAG, INJECTION (ML) INTRAVENOUS
Qty: 8 | Refills: 0 | Status: DISCONTINUED | OUTPATIENT
Start: 2022-05-21 | End: 2022-05-22

## 2022-05-21 RX ORDER — CHLORHEXIDINE GLUCONATE 213 G/1000ML
15 SOLUTION TOPICAL EVERY 12 HOURS
Refills: 0 | Status: DISCONTINUED | OUTPATIENT
Start: 2022-05-21 | End: 2022-06-01

## 2022-05-21 RX ORDER — ACETAMINOPHEN 500 MG
650 TABLET ORAL ONCE
Refills: 0 | Status: COMPLETED | OUTPATIENT
Start: 2022-05-21 | End: 2022-05-21

## 2022-05-21 RX ORDER — HEPARIN SODIUM 5000 [USP'U]/ML
5000 INJECTION INTRAVENOUS; SUBCUTANEOUS EVERY 8 HOURS
Refills: 0 | Status: DISCONTINUED | OUTPATIENT
Start: 2022-05-21 | End: 2022-06-01

## 2022-05-21 RX ADMIN — SODIUM CHLORIDE 1650 MILLILITER(S): 9 INJECTION INTRAMUSCULAR; INTRAVENOUS; SUBCUTANEOUS at 21:20

## 2022-05-21 RX ADMIN — PIPERACILLIN AND TAZOBACTAM 200 GRAM(S): 4; .5 INJECTION, POWDER, LYOPHILIZED, FOR SOLUTION INTRAVENOUS at 20:22

## 2022-05-21 RX ADMIN — Medication 250 MILLIGRAM(S): at 21:00

## 2022-05-21 RX ADMIN — Medication 1 MILLIGRAM(S): at 20:31

## 2022-05-21 RX ADMIN — Medication 750 MILLIGRAM(S): at 22:00

## 2022-05-21 RX ADMIN — Medication 5.02 MICROGRAM(S)/KG/MIN: at 22:03

## 2022-05-21 RX ADMIN — SODIUM CHLORIDE 1000 MILLILITER(S): 9 INJECTION INTRAMUSCULAR; INTRAVENOUS; SUBCUTANEOUS at 22:03

## 2022-05-21 RX ADMIN — Medication 650 MILLIGRAM(S): at 20:51

## 2022-05-21 RX ADMIN — FAMOTIDINE 20 MILLIGRAM(S): 10 INJECTION INTRAVENOUS at 22:03

## 2022-05-21 RX ADMIN — SODIUM CHLORIDE 1650 MILLILITER(S): 9 INJECTION INTRAMUSCULAR; INTRAVENOUS; SUBCUTANEOUS at 20:22

## 2022-05-21 RX ADMIN — PIPERACILLIN AND TAZOBACTAM 3.38 GRAM(S): 4; .5 INJECTION, POWDER, LYOPHILIZED, FOR SOLUTION INTRAVENOUS at 20:51

## 2022-05-21 RX ADMIN — Medication 650 MILLIGRAM(S): at 20:30

## 2022-05-21 NOTE — H&P ADULT - PROBLEM SELECTOR PLAN 1
Versus VAP, admitted to [facility], received one dose of Vancomycin 750 mg IVPB & one dose of Zosyn 3.375 gm earlier after cultures were obtained by ED team, currently on Meropenem & Linezolid, trend TLC, monitor temp, f/u culture results, ID consult with Dr. Umanzor was called.

## 2022-05-21 NOTE — ED PROVIDER NOTE - OBJECTIVE STATEMENT
75 y/o F w/ PMHx of Chronic Respiratory Failure, HTN, DM2, Dementia presents to ED BIBEMS from [facility] for reports of difficulty breathing, time of onset unknown. No reports of fever vomiting or change in baseline status. Pt has been to ED frequently w/ similar complaints.

## 2022-05-21 NOTE — H&P ADULT - HISTORY OF PRESENT ILLNESS
This is an 78 y/o F with PMH of HTN, DM type 2, Cardiac Arrest, CVA, COPD, Chronic Respiratory Failure Vent Dependant s/p trach & PEG, Multiple Hospital Admissions for Aspiration & vent associated PNA, COVID-19 Infection, Anemia with GI blood loss, GERD, and Advanced Dementia who was sent from [facility] facility for acute respiratory distress with unknown onset, no reported fever or chills. On arrival to ED she was found with tachypnea & dropping of her SPO2 on same vent settings, CT chest without contrast revealed worse B/L PNA & B/L pleural effusion compared with her CT one month ago. Patient was discharged from hospital 3 weeks ago after admission for a similar condition, No history could obtained given her status.

## 2022-05-21 NOTE — H&P ADULT - PROBLEM SELECTOR PLAN 5
started her on insulin Glargine 5 units daily in addition to corrective insulin Lispro scale coverage Q 6h till PO resumed, adjust glargine dose based on 24h total insulin requirements.

## 2022-05-21 NOTE — ED ADULT NURSE NOTE - NSIMPLEMENTINTERV_GEN_ALL_ED
Implemented All Fall Risk Interventions:  [place] to call system. Call bell, personal items and telephone within reach. Instruct patient to call for assistance. Room bathroom lighting operational. Non-slip footwear when patient is off stretcher. Physically safe environment: no spills, clutter or unnecessary equipment. Stretcher in lowest position, wheels locked, appropriate side rails in place. Provide visual cue, wrist band, yellow gown, etc. Monitor gait and stability. Monitor for mental status changes and reorient to person, place, and time. Review medications for side effects contributing to fall risk. Reinforce activity limits and safety measures with patient and family.

## 2022-05-21 NOTE — CONSULT NOTE ADULT - ASSESSMENT
Patient is a 75 yo female with a pmh of Chronic resp failure s/p trach and peg, HTN, T2DM, CVA, GERD, and dementia who is being admitted with severe hypoxia.    Problem:  - Acute hypoxic resp failure  - Chronic resp failure  - ARDS   - Shock ; likely septic though unknown etiology  - Pneumonia   - RYAN  - Leukocytosis with bandemia   -       Plan:  Neuro: Will likely need continuous sedation given high vent settings for compliance. Will start propofol. Titrate to maintain vent synchrony  Respiratory: Patient currently on Full vent support, vent setting 18/500/15/100 . Titrate settings to maintain SaO2 >90%, and pH >7.25, consider low tidal volume ventilation strategy w/ goal Tv 4-6 cc/kg of ideal body weight, plateau pressure goal <30. Initial ABG showing 7.39/co2 50 /o2 48/ hco3 30. Will titrate RR up to 28 with decrease TV to 400 for lung protective measures. Planned for CT chest to assess for further etiology. Peridex oral care.   Cardiac: Hypotensive at this time despite fluid resuscitation, will initiate vasopressors with levophed. Actively titrate to maintain MAP >65. Will likely CVC placement.  [place] [address], will need further work up for HF, caution with aggressive IVF.   GI: NPO at this time. PEG tube in place. CT abd/pelvis to assess for intraabdominal etiology of sepsis/shock.  /Renal: With RYAN, likely ATN from hypotension. Received x2 L IVF in ED. Caution with aggressive fluid given BNP. Strict I&O with Woody Trend electrolytes and replace as needed. Maintain K >4 and mag >2. Avoid nephrotoxic agents.  ID: D/t recent septic shock, ESBL and pseudomonas will start on meropenem. Received x1 vanco in ED. x2 blood cultures, urine culture and sputum to be sent. Adjust abx to growth and sensitivity RVP ****. Trend CBC. lactate and procal. ID to be consulted in am.   Hem/Onc: No s/s of active bleeding. DVT ppx with heparin sq. Trend CBC, Transfuse if hgb <7.  Endo: Hx of DM, ISS, goal glucose 100-180.  Dispo: Full code, SPCU/ICU.    Case discussed with EICU attending *****  Patient is a 75 yo female with a pmh of Chronic resp failure s/p trach and peg, HTN, T2DM, CVA, GERD, and dementia who is being admitted with severe hypoxia.    Problem:  - Acute hypoxic resp failure  - Chronic resp failure  - ARDS   - Shock ; likely septic though unknown etiology  - Pneumonia   - RYAN  - Leukocytosis with bandemia   - b/l pleural effusions      Plan:  Neuro: Will likely need continuous sedation given high vent settings for compliance. Will start propofol. Titrate to maintain vent synchrony  Respiratory: Patient currently on Full vent support, vent setting 18/500/15/100 . Titrate settings to maintain SaO2 >90%, and pH >7.25, consider low tidal volume ventilation strategy w/ goal Tv 4-6 cc/kg of ideal body weight, plateau pressure goal <30. Initial ABG showing 7.39/co2 50 /o2 48/ hco3 30. Will titrate RR up to 28 with decrease TV to 400 for lung protective measures. Planned for CT chest to assess for further etiology. Peridex oral care.   Cardiac: Hypotensive at this time despite fluid resuscitation, will initiate vasopressors with levophed. Actively titrate to maintain MAP >65. Will likely CVC placement.  [place] [address], will need further work up for HF, caution with aggressive IVF.   GI: NPO at this time. PEG tube in place. CT abd/pelvis to assess for intraabdominal etiology of sepsis/shock.  /Renal: With RYAN, likely ATN from hypotension. Received x2 L IVF in ED. Caution with aggressive fluid given BNP. Strict I&O with Woody Trend electrolytes and replace as needed. Maintain K >4 and mag >2. Avoid nephrotoxic agents.  ID: D/t recent septic shock, ESBL and pseudomonas will start on meropenem and zyvox. Received x1 vanco in ED. x2 blood cultures, urine culture and sputum to be sent. Adjust abx to growth and sensitivity. RVP negative. Trend CBC. lactate and procal. ID to be consulted in am.   Hem/Onc: No s/s of active bleeding. DVT ppx with heparin sq. Trend CBC, Transfuse if hgb <7.  Endo: Hx of DM, ISS, goal glucose 100-180.  Dispo: Full code, SPCU/ICU.    Case discussed with EICU attending Dr. Andino  Patient is a 75 yo female with a pmh of Chronic resp failure s/p trach and peg, HTN, T2DM, CVA, GERD, and dementia who is being admitted with severe hypoxia.    Problem:  - Acute hypoxic resp failure  - Chronic resp failure  - ARDS   - Shock ; likely septic though unknown etiology  - Pneumonia   - RYAN  - Leukocytosis with bandemia   - b/l pleural effusions      Plan:  Neuro: Will likely need continuous sedation given high vent settings for compliance. Will start propofol. Titrate to maintain vent synchrony. D/w EICU, will start paralytics to optimize ventilator compliance. Will start fentanyl in conjunction with propofol for deep sedations.   Respiratory: Patient currently on Full vent support, vent setting 18/500/15/100 . Titrate settings to maintain SaO2 >90%, and pH >7.25, consider low tidal volume ventilation strategy w/ goal Tv 4-6 cc/kg of ideal body weight, plateau pressure goal <30. Initial ABG showing 7.39/co2 50 /o2 48/ hco3 30. Will titrate RR up to 28 with decrease TV to 400 for lung protective measures. Planned for CT chest to assess for further etiology. Peridex oral care. CT chest showing b/l pleural effusions. May need albumin followed by lasix.  Cardiac: Hypotensive at this time despite fluid resuscitation, will initiate vasopressors with levophed. Actively titrate to maintain MAP >65. Will likely CVC placement.  [place] be [address], will need further work up for HF, caution with aggressive IVF.   GI: NPO at this time. PEG tube in place. CT abd/pelvis to assess for intraabdominal etiology of sepsis/shock.  /Renal: With RYAN, likely ATN from hypotension. Received x2 L IVF in ED. Caution with aggressive fluid given BNP. Strict I&O with Woody Trend electrolytes and replace as needed. Maintain K >4 and mag >2. Avoid nephrotoxic agents.  ID: D/t recent septic shock, ESBL and pseudomonas will start on meropenem and zyvox. Received x1 vanco in ED. x2 blood cultures, urine culture and sputum to be sent. Adjust abx to growth and sensitivity. RVP negative. Trend CBC. lactate and procal. ID to be consulted in am.   Hem/Onc: No s/s of active bleeding. DVT ppx with heparin sq. Trend CBC, Transfuse if hgb <7.  Endo: Hx of DM, ISS, goal glucose 100-180.  Dispo: Full code, SPCU/ICU.    Case discussed with EICU attending Dr. Andino  Patient is a 75 yo female with a pmh of Chronic resp failure s/p trach and peg, HTN, T2DM, CVA, GERD, and dementia who is being admitted with severe hypoxia.    Problem:  - Acute hypoxic resp failure  - Chronic resp failure  - ARDS   - Shock ; likely septic though unknown etiology  - Pneumonia   - RYAN  - Leukocytosis with bandemia   - b/l pleural effusions      Plan:  Neuro: Will likely need continuous sedation given high vent settings for compliance. Will start propofol. Titrate to maintain vent synchrony. D/w EICU, will start paralytics to optimize ventilator compliance. Will start fentanyl in conjunction with propofol for deep sedations.   Respiratory: Patient currently on Full vent support, vent setting 18/500/15/100 . Titrate settings to maintain SaO2 >90%, and pH >7.25, consider low tidal volume ventilation strategy w/ goal Tv 4-6 cc/kg of ideal body weight, plateau pressure goal <30, currently 24. Initial ABG showing 7.39/co2 50 /o2 48/ hco3 30. Will titrate RR up to 28 with decrease TV to 400 for lung protective measures. Planned for CT chest to assess for further etiology. Peridex oral care. CT chest showing b/l pleural effusions. May need albumin followed by lasix.  Cardiac: Hypotensive at this time despite fluid resuscitation, will initiate vasopressors with levophed. Actively titrate to maintain MAP >65. Will likely CVC placement.  [place] [address], will need further work up for HF, caution with aggressive IVF.   GI: NPO at this time. PEG tube in place. CT abd/pelvis to assess for intraabdominal etiology of sepsis/shock.  /Renal: With RYAN, likely ATN from hypotension. Received x2 L IVF in ED. Caution with aggressive fluid given BNP. Strict I&O with Woody Trend electrolytes and replace as needed. Maintain K >4 and mag >2. Avoid nephrotoxic agents.  ID: D/t recent septic shock, ESBL and pseudomonas will start on meropenem and zyvox. Received x1 vanco in ED. x2 blood cultures, urine culture and sputum to be sent. Adjust abx to growth and sensitivity. RVP negative. Trend CBC. lactate and procal. ID to be consulted in am.   Hem/Onc: No s/s of active bleeding. DVT ppx with heparin sq. Trend CBC, Transfuse if hgb <7.  Endo: Hx of DM, ISS, goal glucose 100-180.  Dispo: Full code, SPCU/ICU.    Case discussed with EICU attending Dr. Andino

## 2022-05-21 NOTE — H&P ADULT - PROBLEM SELECTOR PLAN 3
ML 2ry to PNA, initial lactate was normal, received a total IVF bolus of 2650 ml NS earlier by ED team, central line was placed, currently on Norepinephrine & Vasopressin, titrate for MAP > 65 mmHg, monitor I & O, CC consult with Dr. Sandoval.

## 2022-05-21 NOTE — CONSULT NOTE ADULT - ASSESSMENT
Initial evaluation/Pulmonary Critical Care consultation requested on 5/21/2022  by Dr MCCARTHY  from Dr Sandoval   Patient examined chart reviewed    HOSPITAL ADMISSION   PATIENT CAME  FROM (if information available)      REVIEW OF SYMPTOMS      Able to give (reliable) ROS  NO     PHYSICAL EXAM    HEENT Unremarkable  atraumatic   RESP Fair air entry EXP prolonged    Harsh breath sound Resp distres mild   CARDIAC S1 S2 No S3     NO JVD    ABDOMEN SOFT BS PRESENT NOT DISTENDED No hepatosplenomegaly   PEDAL EDEMA present No calf tenderness  NO rash       PATIENT PRESENTATION.  77 yo female with a pmh of Chronic resp failure s/p trach and peg, HTN, T2DM, CVA, GERD, and dementia who presented to  ED on 05/21/22 from [facility] for difficulty breathing. It is unclear how long the patient was hypoxic. Patient unable to participate in information gathering d/t mental status, information obtained via chart review. Of note, patient came to ED on 05/13/22 from [facility] with SOB x5 days, and was DC'd back to SNF/Rehab. Patient was also recently admitted in april of 2022 with septic shock, ESBL UTI, c-diff and resp failure. Hospitalization required vasopressors and advanced ventilator settings.   In ED patient with persistent hypoxia. Ventilator settings escalated to 100% and 14 of peep, though patient unsedated and not tolerating. Given ativan and PEEP increased to 15. Spo2 in mid 80's.   Pulm crit care consulted 5/21/2022                                           AGE/SEX.   76 f    DOA.  5/21/2022     CC .  5/21/2022 AOC RESP FAILURE     PMH .  pmh Hosp stay given zosyn 4/21  pmh Pneumonia    pmh HTN,   pmh DM,   pmh CVA,   pmh  chronic respiratory failure   pmh s/p trach, PEG,   pmh cardiac arrest    MAIN ISSUES  .  SEVERE HYPOXIC RESP FAILURE REQUIRING 100% FIO2 PEEP 15 poa 5/21/2022  VENT DYSSYNCHRONUY 5/22/2022  NMBA 5/21/2022   PROP 5/21/2022  FENT 5/21/2022   MECHNAICAL VENT 5/21/2022   VAP  BL PL EFFSNS   CHF  PULM HYTN     COVID/ICU/CODE STATUS.                       COVID  STATUS. 5/21/2022 scv2 (-)           ICU STAY. none  GOC.      BEST PRACTICE ISSUES.                                                  HEAD OF BED ELEVATION. Yes  DVT PROPHYLAXIS.     5/21/2022 hpsc    SQUIRES PROPHYLAXIS.  INFECTION PROPHYLAXIS.   5/21/2022 Ch;lorhexidine .12%   5/21/2022 Chlorhexidine 2%                                                                                         DIET.   5/2/12022 npo     VITALS/PO/IO/VENT/DRIPS.     5/21/2022 70 140/60        GENERAL ISSUES .                                       ALLERGY.   codeine                         WEIGHT.      5/21/2022 53                                 BMI.                5/21/2022 19            PROBLEM DATA/ASSESSMENT RECOMMENDATIONS (A/R).    HEMODYNAMICS.   Monitor and optimize bp   Target MAP to miguel  65 (+)    RESP.   Monitor and optimize  po   Target po to remain 90-95%    ABG.  5/21/2022 8p 100% vent 739/50/48    PMH/PSH PROBLEMS.  Management continued/modified as indicated    VENT MANAGEMENT.   HOB elevation  Target Pplat 30 (-)  Target PO 90-95%  Target pH 730 (+)  Daily spontaneous breathing trials   Daily sedation vacation         INFECTION SOURCE DD.   INFECTION A/R.   Has ho crp   Has ho iv abio within last 90 d  Is on bsab   Recommend id eval     INFECTION AUGUST.  W 5/21/2022 w 20   ua 5/21/2022 w 3-5   ct ch 5/21/2022 report pending showed bl effsns   MICROBIO.  Rvp 5/21/2022 (-)   PAST MICROBIO.  4/24/2022 sputum CRABAPENEM RESISTANT PSEUDOMONAS  ABIO.  5/21/2022 linezolid  5/21/2022 meropenem 500.2     SHOCK poa.   5/21 Norepi 8 mg/250 0.05 m/k/m   A/R  Likely has distributive shock sec to vap       VENTILATOR DYSSYNCHRONY poa.   5/21 Cisatricurium 200 in 200 3 m/k/m Goal of 2/4 Train of four     SEDATION.  5/21/2022 Fentanyl infusn 2500 in 50 0.5 m/k/h RASS score of vent synchrony   5/21/2022 Propofol 1000 in 100 10 m/k/m       SEVERE HYPOXIC RESP FAILURE poa.   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  echo 9/8/2021   n lvsf   mild dd   n rvsf   rvsp 51   ct 4/22/2022 ct ch 4/21/2022   bl effsns increased in size cw 1/2022  A/R   Cause of pl effsns may nbe sec r heart failure   Will consider diagnotic thoracentesis when more stable     CHF.  echo 9/8/2021   n lvsf   mild dd   n rvsf   rvsp 51   bnp 5/21/2022 bnp 20702     ANEMIA.   Hb 5/21/2022 Hb 8   monitor  target hb 7 (+)     RYAN.  Na 5/21/2022 Na 140  CO2 5/21/2022 CO2 30   Cr 5/21/2022 Cr 1.6   monitor         TIME SPENT   Over 55  minutes aggregate critical care time spent on encounter; activities included   direct patient care, counseling and/or coordinating care reviewing notes, lab data/ imaging , discussion with multidisciplinary team/ patient  /family and explaining in detail risks, benefits, alternatives  of the recommendations     CHAPINCITO GUIDRY 76 f [facility] S 5/21/2022

## 2022-05-21 NOTE — H&P ADULT - PROBLEM SELECTOR PLAN 6
was started on UFH 5000 units sub Q every 8h for VTE prophylaxis, also on Protonix 40 mg IVP daily for GI prophylaxis.

## 2022-05-21 NOTE — CONSULT NOTE ADULT - SUBJECTIVE AND OBJECTIVE BOX
BREA BECKHAM     [facility] 04    Allergies    codeine (Hives)    Intolerances        PAST MEDICAL & SURGICAL HISTORY:  Dementia of frontal lobe type      Aphasic stroke      Diabetes mellitus      Respiratory failure      Hypertension      GERD (gastroesophageal reflux disease)      Constipation      Respiratory failure      CVA (cerebral vascular accident)      HTN (hypertension)      DM (diabetes mellitus)      Advanced dementia      COVID-19 virus detected      Quadriplegia      Pneumonia      Type II diabetes mellitus      Hx of appendectomy      Gastrostomy in place      Tracheostomy in place      Tracheostomy tube present      Feeding by G-tube          FAMILY HISTORY:      Home Medications:  albuterol 90 mcg/inh inhalation aerosol with adapter: 2  inhaled every 6 hours (21 May 2022 21:16)  Bacid (LAC) oral tablet: 2 tab(s) by gastrostomy tube once a day (21 May 2022 21:16)  Basaglar KwikPen 100 units/mL subcutaneous solution: 36 unit(s) subcutaneous once a day (at bedtime) (21 May 2022 21:16)  Betadine 10% topical swab: cleanse right and left great toes (21 May 2022 21:16)  Carafate 1 g/10 mL oral suspension: 10 milliliter(s) by gastrostomy tube 4 times a day (before meals and at bedtime) for 14 days (Started 21) (21 May 2022 21:16)  chlorhexidine 0.12% mucous membrane liquid: 15 milliliter(s) mucous membrane 2 times a day (21 May 2022 21:16)  Eucerin topical cream: Apply topically to affected area once a day bilateral feet (21 May 2022 21:16)  folic acid 1 mg oral tablet: 1 tab(s) orally once a day (21 May 2022 21:16)  ipratropium-albuterol 0.5 mg-2.5 mg/3 mL inhalation solution: 3 milliliter(s) inhaled 4 times a day (21 May 2022 21:16)  LORazepam 1 mg oral tablet: 1 tab(s) by gastrostomy tube every 4 hours (21 May 2022 21:16)  methocarbamol 500 mg oral tablet: 1 tab(s) by gastrostomy tube 2 times a day (21 May 2022 21:16)  MiraLax oral powder for reconstitution:  (21 May 2022 23:14)  Multiple Vitamins oral tablet: 1 tab(s) orally once a day (21 May 2022 21:16)  omeprazole 20 mg oral delayed release capsule: orally 2 times a day (21 May 2022 23:14)  polyethylene glycol 3350 oral powder for reconstitution: 17 gram(s) by gastrostomy tube every 12 hours (21 May 2022 21:16)  senna 8.6 mg oral tablet: 3 tab(s) by gastrostomy tube once a day (at bedtime) (21 May 2022 21:16)  simethicone 80 mg oral tablet, chewable: 1 tab(s) by gastrostomy tube every 6 hours (21 May 2022 21:16)  Tylenol 325 mg oral tablet: 2 tab(s) by gastrostomy tube once a day; 60 minutes prior to dressing change  (21 May 2022 21:16)  Tylenol 325 mg oral tablet: 2 tab(s) by gastrostomy tube every 6 hours, As Needed (21 May 2022 21:16)  Zosyn:  (21 May 2022 23:14)      MEDICATIONS  (STANDING):  chlorhexidine 0.12% Liquid 15 milliLiter(s) Oral Mucosa every 12 hours  norepinephrine Infusion 0.05 MICROgram(s)/kG/Min (5.02 mL/Hr) IV Continuous <Continuous>    MEDICATIONS  (PRN):              Vital Signs Last 24 Hrs  T(C): 37.9 (21 May 2022 19:59), Max: 37.9 (21 May 2022 19:59)  T(F): 100.3 (21 May 2022 19:59), Max: 100.3 (21 May 2022 19:59)  HR: 70 (21 May 2022 23:06) (69 - 89)  BP: 123/60 (21 May 2022 23:06) (86/53 - 141/65)  BP(mean): 83 (21 May 2022 23:06) (66 - 94)  RR: 26 (21 May 2022 22:52) (26 - 36)  SpO2: 95% (21 May 2022 22:52) (82% - 95%)        Mode: AC/ CMV (Assist Control/ Continuous Mandatory Ventilation), RR (machine): 14, TV (machine): 500, FiO2: 100, PEEP: 12, ITime: 1.1, MAP: 23, PIP: 35      LABS:                        8.0    20.49 )-----------( 331      ( 21 May 2022 22:30 )             29.3         140  |  102  |  65<H>  ----------------------------<  277<H>  4.8   |  30  |  1.65<H>    Ca    8.3<L>      21 May 2022 22:30    TPro  6.6  /  Alb  2.0<L>  /  TBili  0.7  /  DBili  x   /  AST  26  /  ALT  17  /  AlkPhos  146<H>      PT/INR - ( 21 May 2022 20:28 )   PT: 13.8 sec;   INR: 1.17 ratio         PTT - ( 21 May 2022 20:28 )  PTT:37.4 sec  Urinalysis Basic - ( 21 May 2022 20:51 )    Color: Yellow / Appearance: Clear / S.010 / pH: x  Gluc: x / Ketone: Negative  / Bili: Negative / Urobili: Negative mg/dL   Blood: x / Protein: 100 mg/dL / Nitrite: Negative   Leuk Esterase: Moderate / RBC: 0-2 /HPF / WBC 3-5   Sq Epi: x / Non Sq Epi: Occasional / Bacteria: x        ABG - ( 21 May 2022 20:54 )  pH, Arterial: 7.39  pH, Blood: x     /  pCO2: 50    /  pO2: 48    / HCO3: 30    / Base Excess: 5.3   /  SaO2: 81.3                WBC:  WBC Count: 20.49 K/uL ( @ 22:30)  WBC Count: 22.94 K/uL ( @ 20:28)      MICROBIOLOGY:  RECENT CULTURES:              PT/INR - ( 21 May 2022 20:28 )   PT: 13.8 sec;   INR: 1.17 ratio         PTT - ( 21 May 2022 20:28 )  PTT:37.4 sec    Sodium:  Sodium, Serum: 140 mmol/L ( @ 22:30)  Sodium, Serum: 141 mmol/L ( @ 20:28)      1.65 mg/dL  @ 22:30  1.62 mg/dL  @ 20:28      Hemoglobin:  Hemoglobin: 8.0 g/dL ( 22:30)  Hemoglobin: 8.1 g/dL ( 20:28)      Platelets: Platelet Count - Automated: 331 K/uL ( @ 22:30)  Platelet Count - Automated: 383 K/uL ( @ 20:28)      LIVER FUNCTIONS - ( 21 May 2022 22:30 )  Alb: 2.0 g/dL / Pro: 6.6 g/dL / ALK PHOS: 146 U/L / ALT: 17 U/L DA / AST: 26 U/L / GGT: x             Urinalysis Basic - ( 21 May 2022 20:51 )    Color: Yellow / Appearance: Clear / S.010 / pH: x  Gluc: x / Ketone: Negative  / Bili: Negative / Urobili: Negative mg/dL   Blood: x / Protein: 100 mg/dL / Nitrite: Negative   Leuk Esterase: Moderate / RBC: 0-2 /HPF / WBC 3-5   Sq Epi: x / Non Sq Epi: Occasional / Bacteria: x        RADIOLOGY & ADDITIONAL STUDIES:      MICROBIOLOGY:  RECENT CULTURES:

## 2022-05-21 NOTE — H&P ADULT - PROBLEM SELECTOR PLAN 2
ML precipitated by the above, repeat ABG shows improvement, vent management as per pulmonary/CC, Dr. Sandoval already saw the patient at the ED, repeat ABG in am.

## 2022-05-21 NOTE — H&P ADULT - NSHPLABSRESULTS_GEN_ALL_CORE
-                 8.0    20.49  )----------(  331       ( 21 May 2022 22:30 )               29.3      140    |  102    |  65     ----------------------------<  277        ( 21 May 2022 22:30 )  4.8     |  30     |  1.65     Ca    8.3        ( 21 May 2022 22:30 )    TPro  6.6    /  Alb  2.0    /  TBili  0.7    /  DBili  x      /  AST  26     /  ALT  17     /  AlkPhos  146    ( 21 May 2022 22:30 )    LIVER FUNCTIONS - ( 21 May 2022 22:30 )  Alb: 2.0 g/dL / Pro: 6.6 g/dL / ALK PHOS: 146 U/L / ALT: 17 U/L DA / AST: 26 U/L / GGT: x           PT/INR -  13.8 sec / 1.17 ratio   ( 21 May 2022 20:28 )       PTT -  37.4 sec   ( 21 May 2022 20:28 )  CAPILLARY BLOOD GLUCOSE        Urinalysis Basic - ( 21 May 2022 20:51 )    Color: Yellow / Appearance: Clear / S.010 / pH: x  Gluc: x / Ketone: Negative  / Bili: Negative / Urobili: Negative mg/dL   Blood: x / Protein: 100 mg/dL / Nitrite: Negative   Leuk Esterase: Moderate / RBC: 0-2 /HPF / WBC 3-5   Sq Epi: x / Non Sq Epi: Occasional / Bacteria: x -                 8.0    20.49  )----------(  331       ( 21 May 2022 22:30 )               29.3      140    |  102    |  65     ----------------------------<  277        ( 21 May 2022 22:30 )  4.8     |  30     |  1.65     Ca    8.3        ( 21 May 2022 22:30 )    TPro  6.6    /  Alb  2.0    /  TBili  0.7    /  DBili  x      /  AST  26     /  ALT  17     /  AlkPhos  146    ( 21 May 2022 22:30 )    LIVER FUNCTIONS - ( 21 May 2022 22:30 )  Alb: 2.0 g/dL / Pro: 6.6 g/dL / ALK PHOS: 146 U/L / ALT: 17 U/L DA / AST: 26 U/L / GGT: x           PT/INR -  13.8 sec / 1.17 ratio   ( 21 May 2022 20:28 )       PTT -  37.4 sec   ( 21 May 2022 20:28 )  CAPILLARY BLOOD GLUCOSE        Urinalysis Basic - ( 21 May 2022 20:51 )    Color: Yellow / Appearance: Clear / S.010 / pH: x  Gluc: x / Ketone: Negative  / Bili: Negative / Urobili: Negative mg/dL   Blood: x / Protein: 100 mg/dL / Nitrite: Negative   Leuk Esterase: Moderate / RBC: 0-2 /HPF / WBC 3-5   Sq Epi: x / Non Sq Epi: Occasional / Bacteria: x      SARS-CoV-2: NotDetec (21 May 2022 20:51)  COVID-19 PCR: NotDetec (2022 08:35)  SARS-CoV-2: NotDetec (2022 13:04)  COVID-19 PCR: NotDetec (2022 12:03)  SARS-CoV-2: NotDetec (2022 21:21)  COVID-19 PCR: NotDetec (20 Dec 2021 07:28)  COVID-19 PCR: NotDetec (13 Dec 2021 19:12)  SARS-CoV-2: NotDetec (08 Dec 2021 11:59)      ABG - ( 22 May 2022 01:15 )  pH: 7.38  /  pCO2: 45    /  pO2: 102   / HCO3: 27    / Base Excess: 1.5   /  SaO2: 99.7          Blood Gas Profile - Arterial (22 @ 20:54)   pH, Arterial: 7.39   pCO2, Arterial: 50 mmHg   pO2, Arterial: 48 mmHg          CXR:    As per my review shows tracheostomy tube in place, almost complete heterogenous opacification of left hemithorax, to lesser extent at the right, normal cardiac shadow size. Pending official report.    Another chest Xray for central line placement shows left IJ with catheter tip at distal SVC, no pneumothorax.      EKG:    As per my review shows poor baseline, SR at 75/min, normal [place] & prolonged QTc intervals, incomplete RBBB, possible old septal MI, normal QRS voltage, duration, and axis (- 15), with normal transition, no ST-T abnormality.        - -                 8.0    20.49  )----------(  331       ( 21 May 2022 22:30 )               29.3      140    |  102    |  65     ----------------------------<  277        ( 21 May 2022 22:30 )  4.8     |  30     |  1.65     Ca    8.3        ( 21 May 2022 22:30 )    TPro  6.6    /  Alb  2.0    /  TBili  0.7    /  DBili  x      /  AST  26     /  ALT  17     /  AlkPhos  146    ( 21 May 2022 22:30 )    LIVER FUNCTIONS - ( 21 May 2022 22:30 )  Alb: 2.0 g/dL / Pro: 6.6 g/dL / ALK PHOS: 146 U/L / ALT: 17 U/L DA / AST: 26 U/L / GGT: x           PT/INR -  13.8 sec / 1.17 ratio   ( 21 May 2022 20:28 )       PTT -  37.4 sec   ( 21 May 2022 20:28 )  CAPILLARY BLOOD GLUCOSE        Urinalysis Basic - ( 21 May 2022 20:51 )    Color: Yellow / Appearance: Clear / S.010 / pH: x  Gluc: x / Ketone: Negative  / Bili: Negative / Urobili: Negative mg/dL   Blood: x / Protein: 100 mg/dL / Nitrite: Negative   Leuk Esterase: Moderate / RBC: 0-2 /HPF / WBC 3-5   Sq Epi: x / Non Sq Epi: Occasional / Bacteria: x      SARS-CoV-2: NotDetec (21 May 2022 20:51)  COVID-19 PCR: NotDetec (2022 08:35)  SARS-CoV-2: NotDetec (2022 13:04)  COVID-19 PCR: NotDetec (2022 12:03)  SARS-CoV-2: NotDetec (2022 21:21)  COVID-19 PCR: NotDetec (20 Dec 2021 07:28)  COVID-19 PCR: NotDetec (13 Dec 2021 19:12)  SARS-CoV-2: NotDetec (08 Dec 2021 11:59)      ABG - ( 22 May 2022 01:15 )  pH: 7.38  /  pCO2: 45    /  pO2: 102   / HCO3: 27    / Base Excess: 1.5   /  SaO2: 99.7          Blood Gas Profile - Arterial (22 @ 20:54)   pH, Arterial: 7.39   pCO2, Arterial: 50 mmHg   pO2, Arterial: 48 mmHg          CXR:    As per my review shows tracheostomy tube in place, almost complete heterogenous opacification of left hemithorax, to lesser extent at the right, normal cardiac shadow size. Pending official report.    Another reviewed chest Xray for central line placement shows left IJ with catheter tip at distal SVC, no pneumothorax. Pending official report.      EKG:    As per my review shows poor baseline, SR at 75/min, normal [place] & prolonged QTc intervals, incomplete RBBB, possible old septal MI, normal QRS voltage, duration, and axis (- 15), with normal transition, no ST-T abnormality.        -

## 2022-05-21 NOTE — H&P ADULT - NSHPPHYSICALEXAM_GEN_ALL_CORE
-    Vital Signs Last 24 Hrs  T(C): 36.3 (21 May 2022 23:14), Max: 37.9 (21 May 2022 19:59)  T(F): 97.4 (21 May 2022 23:14), Max: 100.3 (21 May 2022 19:59)  HR: 58 (21 May 2022 23:14) (57 - 72)  BP: 122/44 (21 May 2022 23:14) (70/46 - 122/44)  BP(mean): 63 (21 May 2022 23:14) (51 - 63)  RR: 26 (21 May 2022 23:14) (20 - 26)  SpO2: 100% (21 May 2022 23:14) (82% - 100%)        PHYSICAL EXAM:  		  GENERAL: Contracted.  HEAD:  Atraumatic, Norm cephalic.  EYES: PERRLA, conjunctiva clear.  ENMT: no nasal discharge, MMM, (+) tracheostomy tube, unremarkable.   NECK: Supple, No JVD.  NERVOUS SYSTEM: Quadriplegic, cerebral palsy posture, (+) spasticity.  CHEST/LUNG: Diminished air entry B/L lower lung zones, (+) rales B/L, (+) conducted upper airway sounds, no rhonchi, or wheezing.  HEART: Normal S1 & S2, no murmurs, or extra sounds.  ABDOMEN: Soft, non-distended; bowel sounds present, no palpable masses or organomegaly, (+) PEG tube, unremarkable stoma.  EXTREMITIES:  No clubbing, cyanosis, or edema.  VASCULAR: 2+ radial, brachial pulses B/L.  SKIN: (+) stage 3 gluteal decubitus.  PSYCH: No agitation.

## 2022-05-21 NOTE — ED CLERICAL - NS ED CLERK NOTE PRE-ARRIVAL INFORMATION; ADDITIONAL PRE-ARRIVAL INFORMATION
This patient is enrolled in the COPD or PNA STARS readmission program and has active care navigation.  Please call the contact number above to speak with the Care navigation team to obtain additional clinical information regarding this patient and/or to arrange close clinical follow up within 24 hours.       For expedited pulmonary follow-up appointments, you may email [email or web address]. Please include in this email: patient name, date of birth, MRN and contact information.

## 2022-05-21 NOTE — H&P ADULT - PROBLEM SELECTOR PROBLEM 2
RX PROGRESS NOTE: Vancomycin Therapeutic Drug Monitoring      Indication for therapy: Pneumonia    ALLERGIES:   Allergen Reactions   • Amlodipine SWELLING   • Cymbalta [Duloxetine Hcl] RASH and Other (See Comments)   • Glipizide Other (See Comments)   • Penicillins SWELLING   • Talwin RASH and Other (See Comments)   • Tegretol VOMITING and DIARRHEA   • Zithromax [Azithromycin] RASH and Other (See Comments)       Most recent height and weight information:  Weight: 66.4 kg (05/14/20 1208)  Height: 5' 7\" (170.2 cm) (05/14/20 0754)    The Following are the calculated  Current Weights for Josy Matthews            Adjusted Ideal    63.5 kg 61.6 kg             Labs:  Serum Creatinine and Creatinine Clearance:  Serum creatinine: 2.4 mg/dL (H) 05/14/20 0818  Estimated creatinine clearance: 16.4 mL/min (A)    Maximum Temperature (last 24 hours)     Value Max    Temp  98.33 °F (36.9 °C)        WBC (K/mcL)   Date/Time Value   05/14/2020 0808 10.8     Microbiology Results     None            Assessment/Plan:  Briefly, this is a 84 year old female started on vancomycin for pneumonia, with a target serum trough concentration of 10-15 mcg/mL.  Patient received a dose of vancomycin 1500 mg IV at 1304 on 5/14/2020.  Initial dosing regimen will be 1250 mg IV every 48 hours (maintenance dose).    Pharmacy will monitor levels as appropriate.    Pharmacy will continue to monitor patient (renal function, microbiology data, risk factors for adverse events, appropriate duration of therapy), will order/monitor serum levels as appropriate, and will adjust dose if/when necessary.      Thank you,    Zaire Reyna, Pharm.D.  5/14/2020 4:01 PM     Acute on chronic respiratory failure with hypoxia

## 2022-05-21 NOTE — CONSULT NOTE ADULT - SUBJECTIVE AND OBJECTIVE BOX
Patient is a 76y old  Female who presents with a chief complaint of     BRIEF HOSPITAL COURSE: Patient is a 77 yo female with a pmh of Chronic resp failure s/p trach and peg, HTN, T2DM, CVA, GERD, and dementia who presented to  ED on 22 from [facility] for difficulty breathing. It is unclear how long the patient was hypoxic. Patient unable to participate in information gathering d/t mental status, information obtained via chart review. Of note, patient came to ED on 22 from [facility] with SOB x5 days, and was DC'd back to SNF/Rehab. Patient was also recently admitted in 2022 with septic shock, ESBL UTI, c-diff and resp failure. Hospitalization required vasopressors and advanced ventilator settings.   In ED patient with persistent hypoxia. Ventilator settings escalated to 100% and 14 of peep, though patient unsedated and not tolerating. Given ativan and PEEP increased to 15. Spo2 in mid 80's.       Events last 24 hours: Arrived unresponsive, hypoxic. Admitted to [facility].    PAST MEDICAL & SURGICAL HISTORY:  Dementia of frontal lobe type  Aphasic stroke  Diabetes mellitus  Respiratory failure  Hypertension  GERD (gastroesophageal reflux disease)  Constipation  Respiratory failure  CVA (cerebral vascular accident)  HTN (hypertension)  DM (diabetes mellitus)  Advanced dementia  COVID-19 virus detected  Quadriplegia  Pneumonia  Type II diabetes mellitus  Hx of appendectomy  Gastrostomy in place  Tracheostomy in place  Tracheostomy tube present  Feeding by G-tube      Review of Systems:  Unable to be completed d/t clinical condition / mental status.      Medications:  chlorhexidine 0.12% Liquid 15 milliLiter(s) Oral Mucosa every 12 hours        Mode: AC/ CMV (Assist Control/ Continuous Mandatory Ventilation)  RR (machine): 14  TV (machine): 500  FiO2: 100  PEEP: 12  ITime: 1.1  MAP: 23  PIP: 35      ICU Vital Signs Last 24 Hrs  T(C): 37.9 (21 May 2022 19:59), Max: 37.9 (21 May 2022 19:59)  T(F): 100.3 (21 May 2022 19:59), Max: 100.3 (21 May 2022 19:59)  HR: 74 (21 May 2022 21:07) (74 - 89)  BP: 92/55 (21 May 2022 21:07) (92/55 - 116/62)  BP(mean): --  ABP: --  ABP(mean): --  RR: 30 (21 May 2022 21:07) (30 - 36)  SpO2: 82% (21 May 2022 21:07) (82% - 87%)      ABG - ( 21 May 2022 20:54 )  pH, Arterial: 7.39  pH, Blood: x     /  pCO2: 50    /  pO2: 48    / HCO3: 30    / Base Excess: 5.3   /  SaO2: 81.3      I&O's Detail      LABS:                        8.1    22.94 )-----------( 383      ( 21 May 2022 20:28 )             28.8     05-    141  |  100  |  68<H>  ----------------------------<  267<H>  4.8   |  34<H>  |  1.62<H>    Ca    9.1      21 May 2022 20:28    TPro  7.7  /  Alb  2.4<L>  /  TBili  0.5  /  DBili  x   /  AST  20  /  ALT  17  /  AlkPhos  132<H>  -    CAPILLARY BLOOD GLUCOSE    PT/INR - ( 21 May 2022 20:28 )   PT: 13.8 sec;   INR: 1.17 ratio         PTT - ( 21 May 2022 20:28 )  PTT:37.4 sec  Urinalysis Basic - ( 21 May 2022 20:51 )    Color: Yellow / Appearance: Clear / S.010 / pH: x  Gluc: x / Ketone: Negative  / Bili: Negative / Urobili: Negative mg/dL   Blood: x / Protein: 100 mg/dL / Nitrite: Negative   Leuk Esterase: Moderate / RBC: 0-2 /HPF / WBC 3-5   Sq Epi: x / Non Sq Epi: Occasional / Bacteria: x    CULTURES:    Physical Examination:    General: Unresponsive, obtunded    HEENT: Pupils equal, reactive to light.  Symmetric.    PULM: Diminished breath sounds in b/l lobes.     CVS: Regular rate and rhythm, no murmurs, rubs, or gallops    ABD: Soft, nondistended, hypoactive bowel sounds    EXT: No edema, nontender    SKIN: Warm and well perfused, no rashes noted.    NEURO: Unresponsive, no purposeful movement.      RADIOLOGY: ***      CRITICAL CARE TIME SPENT: ***  Evaluating/treating patient, reviewing data/labs/imaging, discussing case with multidisciplinary team, discussing plan/goals of care with patient/family. Non-inclusive of procedure time.   Patient is a 76y old  Female who presents with a chief complaint of     BRIEF HOSPITAL COURSE: Patient is a 77 yo female with a pmh of Chronic resp failure s/p trach and peg, HTN, T2DM, CVA, GERD, and dementia who presented to  ED on 22 from [facility] for difficulty breathing. It is unclear how long the patient was hypoxic. Patient unable to participate in information gathering d/t mental status, information obtained via chart review. Of note, patient came to ED on 22 from [facility] with SOB x5 days, and was DC'd back to SNF/Rehab. Patient was also recently admitted in 2022 with septic shock, ESBL UTI, c-diff and resp failure. Hospitalization required vasopressors and advanced ventilator settings.   In ED patient with persistent hypoxia. Ventilator settings escalated to 100% and 14 of peep, though patient unsedated and not tolerating. Given ativan and PEEP increased to 15. Spo2 in mid 80's.       Events last 24 hours: Arrived unresponsive, hypoxic. Admitted to SPCU.    PAST MEDICAL & SURGICAL HISTORY:  Dementia of frontal lobe type  Aphasic stroke  Diabetes mellitus  Respiratory failure  Hypertension  GERD (gastroesophageal reflux disease)  Constipation  Respiratory failure  CVA (cerebral vascular accident)  HTN (hypertension)  DM (diabetes mellitus)  Advanced dementia  COVID-19 virus detected  Quadriplegia  Pneumonia  Type II diabetes mellitus  Hx of appendectomy  Gastrostomy in place  Tracheostomy in place  Tracheostomy tube present  Feeding by G-tube      Review of Systems:  Unable to be completed d/t clinical condition / mental status.      Medications:  chlorhexidine 0.12% Liquid 15 milliLiter(s) Oral Mucosa every 12 hours        Mode: AC/ CMV (Assist Control/ Continuous Mandatory Ventilation)  RR (machine): 14  TV (machine): 500  FiO2: 100  PEEP: 12  ITime: 1.1  MAP: 23  PIP: 35      ICU Vital Signs Last 24 Hrs  T(C): 37.9 (21 May 2022 19:59), Max: 37.9 (21 May 2022 19:59)  T(F): 100.3 (21 May 2022 19:59), Max: 100.3 (21 May 2022 19:59)  HR: 74 (21 May 2022 21:07) (74 - 89)  BP: 92/55 (21 May 2022 21:07) (92/55 - 116/62)  BP(mean): --  ABP: --  ABP(mean): --  RR: 30 (21 May 2022 21:07) (30 - 36)  SpO2: 82% (21 May 2022 21:07) (82% - 87%)      ABG - ( 21 May 2022 20:54 )  pH, Arterial: 7.39  pH, Blood: x     /  pCO2: 50    /  pO2: 48    / HCO3: 30    / Base Excess: 5.3   /  SaO2: 81.3      I&O's Detail      LABS:                        8.1    22.94 )-----------( 383      ( 21 May 2022 20:28 )             28.8     05-    141  |  100  |  68<H>  ----------------------------<  267<H>  4.8   |  34<H>  |  1.62<H>    Ca    9.1      21 May 2022 20:28    TPro  7.7  /  Alb  2.4<L>  /  TBili  0.5  /  DBili  x   /  AST  20  /  ALT  17  /  AlkPhos  132<H>  -    CAPILLARY BLOOD GLUCOSE    PT/INR - ( 21 May 2022 20:28 )   PT: 13.8 sec;   INR: 1.17 ratio         PTT - ( 21 May 2022 20:28 )  PTT:37.4 sec  Urinalysis Basic - ( 21 May 2022 20:51 )    Color: Yellow / Appearance: Clear / S.010 / pH: x  Gluc: x / Ketone: Negative  / Bili: Negative / Urobili: Negative mg/dL   Blood: x / Protein: 100 mg/dL / Nitrite: Negative   Leuk Esterase: Moderate / RBC: 0-2 /HPF / WBC 3-5   Sq Epi: x / Non Sq Epi: Occasional / Bacteria: x    CULTURES:    Physical Examination:    General: Unresponsive, obtunded    HEENT: Pupils equal, reactive to light.  Symmetric.    PULM: Diminished breath sounds in b/l lobes.     CVS: Regular rate and rhythm, no murmurs, rubs, or gallops    ABD: Soft, nondistended, hypoactive bowel sounds    EXT: No edema, nontender    SKIN: Warm and well perfused, no rashes noted.    NEURO: Unresponsive, no purposeful movement.      RADIOLOGY:       CRITICAL CARE TIME SPENT: 67 minutes  Evaluating/treating patient, reviewing data/labs/imaging, discussing case with multidisciplinary team, discussing plan/goals of care with patient/family. Non-inclusive of procedure time.   Patient is a 76y old  Female who presents with a chief complaint of     BRIEF HOSPITAL COURSE: Patient is a 77 yo female with a pmh of Chronic resp failure s/p trach and peg, HTN, T2DM, CVA, GERD, and dementia who presented to  ED on 22 from [facility] for difficulty breathing. It is unclear how long the patient was hypoxic. Patient unable to participate in information gathering d/t mental status, information obtained via chart review. Of note, patient came to ED on 22 from [facility] with SOB x5 days, and was DC'd back to SNF/Rehab. Patient was also recently admitted in 2022 with septic shock, ESBL UTI, c-diff and resp failure. Hospitalization required vasopressors and advanced ventilator settings.   In ED patient with persistent hypoxia. Ventilator settings escalated to 100% and 14 of peep, though patient unsedated and not tolerating. Given ativan and PEEP increased to 15. Spo2 in mid 80's.       Events last 24 hours: Arrived unresponsive, hypoxic. Admitted to SPCU.    PAST MEDICAL & SURGICAL HISTORY:  Dementia of frontal lobe type  Aphasic stroke  Diabetes mellitus  Respiratory failure  Hypertension  GERD (gastroesophageal reflux disease)  Constipation  Respiratory failure  CVA (cerebral vascular accident)  HTN (hypertension)  DM (diabetes mellitus)  Advanced dementia  COVID-19 virus detected  Quadriplegia  Pneumonia  Type II diabetes mellitus  Hx of appendectomy  Gastrostomy in place  Tracheostomy in place  Tracheostomy tube present  Feeding by G-tube      Review of Systems:  Unable to be completed d/t clinical condition / mental status.      Medications:  chlorhexidine 0.12% Liquid 15 milliLiter(s) Oral Mucosa every 12 hours        Mode: AC/ CMV (Assist Control/ Continuous Mandatory Ventilation)  RR (machine): 14  TV (machine): 500  FiO2: 100  PEEP: 12  ITime: 1.1  MAP: 23  PIP: 35      ICU Vital Signs Last 24 Hrs  T(C): 37.9 (21 May 2022 19:59), Max: 37.9 (21 May 2022 19:59)  T(F): 100.3 (21 May 2022 19:59), Max: 100.3 (21 May 2022 19:59)  HR: 74 (21 May 2022 21:07) (74 - 89)  BP: 92/55 (21 May 2022 21:07) (92/55 - 116/62)  BP(mean): --  ABP: --  ABP(mean): --  RR: 30 (21 May 2022 21:07) (30 - 36)  SpO2: 82% (21 May 2022 21:07) (82% - 87%)      ABG - ( 21 May 2022 20:54 )  pH, Arterial: 7.39  pH, Blood: x     /  pCO2: 50    /  pO2: 48    / HCO3: 30    / Base Excess: 5.3   /  SaO2: 81.3      I&O's Detail      LABS:                        8.1    22.94 )-----------( 383      ( 21 May 2022 20:28 )             28.8     05-    141  |  100  |  68<H>  ----------------------------<  267<H>  4.8   |  34<H>  |  1.62<H>    Ca    9.1      21 May 2022 20:28    TPro  7.7  /  Alb  2.4<L>  /  TBili  0.5  /  DBili  x   /  AST  20  /  ALT  17  /  AlkPhos  132<H>  -    CAPILLARY BLOOD GLUCOSE    PT/INR - ( 21 May 2022 20:28 )   PT: 13.8 sec;   INR: 1.17 ratio         PTT - ( 21 May 2022 20:28 )  PTT:37.4 sec  Urinalysis Basic - ( 21 May 2022 20:51 )    Color: Yellow / Appearance: Clear / S.010 / pH: x  Gluc: x / Ketone: Negative  / Bili: Negative / Urobili: Negative mg/dL   Blood: x / Protein: 100 mg/dL / Nitrite: Negative   Leuk Esterase: Moderate / RBC: 0-2 /HPF / WBC 3-5   Sq Epi: x / Non Sq Epi: Occasional / Bacteria: x    CULTURES:    Physical Examination:    General: Unresponsive, obtunded    HEENT: Pupils equal, reactive to light.  Symmetric.    PULM: Diminished breath sounds in b/l lobes. Coarse scattered crackles. Chest expansion symmetrical.    CVS: Regular rate and rhythm, no murmurs, rubs, or gallops    ABD: Soft, distended, PEG Site in place leaking around, hypoactive bowel sounds    EXT: No edema, nontender    SKIN: Warm and well perfused, no rashes noted.    NEURO: Unresponsive, no purposeful movement.      RADIOLOGY:       CRITICAL CARE TIME SPENT: 67 minutes  Evaluating/treating patient, reviewing data/labs/imaging, discussing case with multidisciplinary team, discussing plan/goals of care with patient/family. Non-inclusive of procedure time.   Patient is a 76y old  Female who presents with a chief complaint of     BRIEF HOSPITAL COURSE: Patient is a 77 yo female with a pmh of Chronic resp failure s/p trach and peg, HTN, T2DM, CVA, GERD, and dementia who presented to  ED on 22 from [facility] for difficulty breathing. It is unclear how long the patient was hypoxic. Patient unable to participate in information gathering d/t mental status, information obtained via chart review. Of note, patient came to ED on 22 from [facility] with SOB x5 days, and was DC'd back to SNF/Rehab. Patient was also recently admitted in 2022 with septic shock, ESBL UTI, c-diff and resp failure. Hospitalization required vasopressors and advanced ventilator settings.   In ED patient with persistent hypoxia. Ventilator settings escalated to 100% and 14 of peep, though patient unsedated and not tolerating. Given ativan and PEEP increased to 15. Spo2 in mid 80's.       Events last 24 hours: Arrived unresponsive, hypoxic. Admitted to SPCU.    PAST MEDICAL & SURGICAL HISTORY:  Dementia of frontal lobe type  Aphasic stroke  Diabetes mellitus  Respiratory failure  Hypertension  GERD (gastroesophageal reflux disease)  Constipation  Respiratory failure  CVA (cerebral vascular accident)  HTN (hypertension)  DM (diabetes mellitus)  Advanced dementia  COVID-19 virus detected  Quadriplegia  Pneumonia  Type II diabetes mellitus  Hx of appendectomy  Gastrostomy in place  Tracheostomy in place  Tracheostomy tube present  Feeding by G-tube      Review of Systems:  Unable to be completed d/t clinical condition / mental status.      Medications:  chlorhexidine 0.12% Liquid 15 milliLiter(s) Oral Mucosa every 12 hours        Mode: AC/ CMV (Assist Control/ Continuous Mandatory Ventilation)  RR (machine): 14  TV (machine): 500  FiO2: 100  PEEP: 12  ITime: 1.1  MAP: 23  PIP: 35      ICU Vital Signs Last 24 Hrs  T(C): 37.9 (21 May 2022 19:59), Max: 37.9 (21 May 2022 19:59)  T(F): 100.3 (21 May 2022 19:59), Max: 100.3 (21 May 2022 19:59)  HR: 74 (21 May 2022 21:07) (74 - 89)  BP: 92/55 (21 May 2022 21:07) (92/55 - 116/62)  BP(mean): --  ABP: --  ABP(mean): --  RR: 30 (21 May 2022 21:07) (30 - 36)  SpO2: 82% (21 May 2022 21:07) (82% - 87%)      ABG - ( 21 May 2022 20:54 )  pH, Arterial: 7.39  pH, Blood: x     /  pCO2: 50    /  pO2: 48    / HCO3: 30    / Base Excess: 5.3   /  SaO2: 81.3      I&O's Detail      LABS:                        8.1    22.94 )-----------( 383      ( 21 May 2022 20:28 )             28.8     05-    141  |  100  |  68<H>  ----------------------------<  267<H>  4.8   |  34<H>  |  1.62<H>    Ca    9.1      21 May 2022 20:28    TPro  7.7  /  Alb  2.4<L>  /  TBili  0.5  /  DBili  x   /  AST  20  /  ALT  17  /  AlkPhos  132<H>  -    CAPILLARY BLOOD GLUCOSE    PT/INR - ( 21 May 2022 20:28 )   PT: 13.8 sec;   INR: 1.17 ratio         PTT - ( 21 May 2022 20:28 )  PTT:37.4 sec  Urinalysis Basic - ( 21 May 2022 20:51 )    Color: Yellow / Appearance: Clear / S.010 / pH: x  Gluc: x / Ketone: Negative  / Bili: Negative / Urobili: Negative mg/dL   Blood: x / Protein: 100 mg/dL / Nitrite: Negative   Leuk Esterase: Moderate / RBC: 0-2 /HPF / WBC 3-5   Sq Epi: x / Non Sq Epi: Occasional / Bacteria: x    CULTURES:    Physical Examination:    General: Unresponsive, obtunded    HEENT: Pupils equal, reactive to light.  Symmetric.    PULM: Diminished breath sounds in b/l lobes. Coarse scattered crackles. Chest expansion symmetrical.    CVS: Regular rate and rhythm, no murmurs, rubs, or gallops    ABD: Soft, distended, PEG Site in place leaking around, hypoactive bowel sounds    EXT: No edema, nontender. B/l UE contracted inwards on chest.    SKIN: Warm and well perfused, no rashes noted.    NEURO: Unresponsive, no purposeful movement.    POCUS technically difficult given contractors. B-lines scattered to b/l lobes.      RADIOLOGY:       CRITICAL CARE TIME SPENT: 67 minutes  Evaluating/treating patient, reviewing data/labs/imaging, discussing case with multidisciplinary team, discussing plan/goals of care with patient/family. Non-inclusive of procedure time.

## 2022-05-21 NOTE — H&P ADULT - ASSESSMENT
78 y/o F with PMH of HTN, DM type 2, Cardiac Arrest, CVA, COPD, Chronic Respiratory Failure Vent Dependant s/p trach & PEG, Multiple Hospital Admissions for Aspiration & vent associated PNA, COVID-19 Infection, Anemia with GI blood loss, GERD, and Advanced Dementia presented with acute respiratory distress.

## 2022-05-21 NOTE — ED ADULT NURSE NOTE - NS ED NURSE TRANSPORT WITH
Cardiac Monitor/Defib/ACLS/Rescue Kit/O2/BVM Cardiac Monitor/Defib/ACLS/Rescue Kit/O2/BVM/oxygen/ventilator

## 2022-05-21 NOTE — H&P ADULT - PROBLEM SELECTOR PLAN 4
ML 2ry to sepsis & hypotension, received the above IVF bolus, Pro BNP came back 14634 pg/ml, hold off maintenance IVF, currently on 2 pressors, avoid nephrotoxins & renally dose medications, monitor renal indices, nephrology consult with Dr. Quintero was called.

## 2022-05-22 DIAGNOSIS — E11.9 TYPE 2 DIABETES MELLITUS WITHOUT COMPLICATIONS: ICD-10-CM

## 2022-05-22 DIAGNOSIS — N17.9 ACUTE KIDNEY FAILURE, UNSPECIFIED: ICD-10-CM

## 2022-05-22 DIAGNOSIS — Z29.9 ENCOUNTER FOR PROPHYLACTIC MEASURES, UNSPECIFIED: ICD-10-CM

## 2022-05-22 DIAGNOSIS — J96.21 ACUTE AND CHRONIC RESPIRATORY FAILURE WITH HYPOXIA: ICD-10-CM

## 2022-05-22 DIAGNOSIS — A41.9 SEPSIS, UNSPECIFIED ORGANISM: ICD-10-CM

## 2022-05-22 DIAGNOSIS — J69.0 PNEUMONITIS DUE TO INHALATION OF FOOD AND VOMIT: ICD-10-CM

## 2022-05-22 LAB
-  CEFTAZIDIME/AVIBACTAM: SIGNIFICANT CHANGE UP
-  CEFTOLOZANE/TAZOBACTAM: SIGNIFICANT CHANGE UP
ALBUMIN SERPL ELPH-MCNC: 2 G/DL — LOW (ref 3.3–5)
ALBUMIN SERPL ELPH-MCNC: 2 G/DL — LOW (ref 3.3–5)
ALP SERPL-CCNC: 201 U/L — HIGH (ref 30–120)
ALP SERPL-CCNC: 204 U/L — HIGH (ref 30–120)
ALT FLD-CCNC: 150 U/L DA — HIGH (ref 10–60)
ALT FLD-CCNC: 49 U/L DA — SIGNIFICANT CHANGE UP (ref 10–60)
ANION GAP SERPL CALC-SCNC: 7 MMOL/L — SIGNIFICANT CHANGE UP (ref 5–17)
ANION GAP SERPL CALC-SCNC: 9 MMOL/L — SIGNIFICANT CHANGE UP (ref 5–17)
APTT BLD: 34.5 SEC — SIGNIFICANT CHANGE UP (ref 27.5–35.5)
AST SERPL-CCNC: 184 U/L — HIGH (ref 10–40)
AST SERPL-CCNC: 80 U/L — HIGH (ref 10–40)
BASE EXCESS BLDA CALC-SCNC: 1.5 MMOL/L — SIGNIFICANT CHANGE UP (ref -2–3)
BASE EXCESS BLDA CALC-SCNC: 3.6 MMOL/L — HIGH (ref -2–3)
BASOPHILS # BLD AUTO: 0.04 K/UL — SIGNIFICANT CHANGE UP (ref 0–0.2)
BASOPHILS NFR BLD AUTO: 0.3 % — SIGNIFICANT CHANGE UP (ref 0–2)
BILIRUB SERPL-MCNC: 0.6 MG/DL — SIGNIFICANT CHANGE UP (ref 0.2–1.2)
BILIRUB SERPL-MCNC: 0.7 MG/DL — SIGNIFICANT CHANGE UP (ref 0.2–1.2)
BLOOD GAS COMMENTS ARTERIAL: SIGNIFICANT CHANGE UP
BLOOD GAS COMMENTS ARTERIAL: SIGNIFICANT CHANGE UP
BUN SERPL-MCNC: 61 MG/DL — HIGH (ref 7–23)
BUN SERPL-MCNC: 63 MG/DL — HIGH (ref 7–23)
CALCIUM SERPL-MCNC: 8 MG/DL — LOW (ref 8.4–10.5)
CALCIUM SERPL-MCNC: 8.2 MG/DL — LOW (ref 8.4–10.5)
CHLORIDE SERPL-SCNC: 102 MMOL/L — SIGNIFICANT CHANGE UP (ref 96–108)
CHLORIDE SERPL-SCNC: 102 MMOL/L — SIGNIFICANT CHANGE UP (ref 96–108)
CO2 SERPL-SCNC: 29 MMOL/L — SIGNIFICANT CHANGE UP (ref 22–31)
CO2 SERPL-SCNC: 31 MMOL/L — SIGNIFICANT CHANGE UP (ref 22–31)
CREAT SERPL-MCNC: 1.59 MG/DL — HIGH (ref 0.5–1.3)
CREAT SERPL-MCNC: 1.72 MG/DL — HIGH (ref 0.5–1.3)
EGFR: 30 ML/MIN/1.73M2 — LOW
EGFR: 33 ML/MIN/1.73M2 — LOW
EOSINOPHIL # BLD AUTO: 0.04 K/UL — SIGNIFICANT CHANGE UP (ref 0–0.5)
EOSINOPHIL NFR BLD AUTO: 0.3 % — SIGNIFICANT CHANGE UP (ref 0–6)
GAS PNL BLDA: SIGNIFICANT CHANGE UP
GAS PNL BLDA: SIGNIFICANT CHANGE UP
GLUCOSE SERPL-MCNC: 294 MG/DL — HIGH (ref 70–99)
GLUCOSE SERPL-MCNC: 295 MG/DL — HIGH (ref 70–99)
HCO3 BLDA-SCNC: 27 MMOL/L — SIGNIFICANT CHANGE UP (ref 21–28)
HCO3 BLDA-SCNC: 28 MMOL/L — SIGNIFICANT CHANGE UP (ref 21–28)
HCT VFR BLD CALC: 25.7 % — LOW (ref 34.5–45)
HCT VFR BLD CALC: 26.1 % — LOW (ref 34.5–45)
HGB BLD-MCNC: 7.2 G/DL — LOW (ref 11.5–15.5)
HGB BLD-MCNC: 7.3 G/DL — LOW (ref 11.5–15.5)
HOROWITZ INDEX BLDA+IHG-RTO: 100 — SIGNIFICANT CHANGE UP
HOROWITZ INDEX BLDA+IHG-RTO: 90 — SIGNIFICANT CHANGE UP
IMM GRANULOCYTES NFR BLD AUTO: 1.1 % — SIGNIFICANT CHANGE UP (ref 0–1.5)
INR BLD: 1.23 RATIO — HIGH (ref 0.88–1.16)
LACTATE SERPL-SCNC: 1.6 MMOL/L — SIGNIFICANT CHANGE UP (ref 0.7–2)
LACTATE SERPL-SCNC: 3.4 MMOL/L — HIGH (ref 0.7–2)
LACTATE SERPL-SCNC: 3.6 MMOL/L — HIGH (ref 0.7–2)
LIDOCAIN IGE QN: 77 U/L — SIGNIFICANT CHANGE UP (ref 73–393)
LYMPHOCYTES # BLD AUTO: 1.67 K/UL — SIGNIFICANT CHANGE UP (ref 1–3.3)
LYMPHOCYTES # BLD AUTO: 12.8 % — LOW (ref 13–44)
MAGNESIUM SERPL-MCNC: 3.3 MG/DL — HIGH (ref 1.6–2.6)
MCHC RBC-ENTMCNC: 24.5 PG — LOW (ref 27–34)
MCHC RBC-ENTMCNC: 24.7 PG — LOW (ref 27–34)
MCHC RBC-ENTMCNC: 28 GM/DL — LOW (ref 32–36)
MCHC RBC-ENTMCNC: 28 GM/DL — LOW (ref 32–36)
MCV RBC AUTO: 87.4 FL — SIGNIFICANT CHANGE UP (ref 80–100)
MCV RBC AUTO: 88.2 FL — SIGNIFICANT CHANGE UP (ref 80–100)
MONOCYTES # BLD AUTO: 0.65 K/UL — SIGNIFICANT CHANGE UP (ref 0–0.9)
MONOCYTES NFR BLD AUTO: 5 % — SIGNIFICANT CHANGE UP (ref 2–14)
NEUTROPHILS # BLD AUTO: 10.55 K/UL — HIGH (ref 1.8–7.4)
NEUTROPHILS NFR BLD AUTO: 80.5 % — HIGH (ref 43–77)
NRBC # BLD: 0 /100 WBCS — SIGNIFICANT CHANGE UP (ref 0–0)
NRBC # BLD: 0 /100 WBCS — SIGNIFICANT CHANGE UP (ref 0–0)
ORGANISM # SPEC MICROSCOPIC CNT: SIGNIFICANT CHANGE UP
ORGANISM # SPEC MICROSCOPIC CNT: SIGNIFICANT CHANGE UP
PCO2 BLDA: 41 MMHG — HIGH (ref 32–35)
PCO2 BLDA: 45 MMHG — HIGH (ref 32–35)
PH BLDA: 7.38 — SIGNIFICANT CHANGE UP (ref 7.35–7.45)
PH BLDA: 7.44 — SIGNIFICANT CHANGE UP (ref 7.35–7.45)
PHOSPHATE SERPL-MCNC: 2.9 MG/DL — SIGNIFICANT CHANGE UP (ref 2.5–4.5)
PLATELET # BLD AUTO: 332 K/UL — SIGNIFICANT CHANGE UP (ref 150–400)
PLATELET # BLD AUTO: 423 K/UL — HIGH (ref 150–400)
PO2 BLDA: 102 MMHG — SIGNIFICANT CHANGE UP (ref 83–108)
PO2 BLDA: 117 MMHG — HIGH (ref 83–108)
POTASSIUM SERPL-MCNC: 4.6 MMOL/L — SIGNIFICANT CHANGE UP (ref 3.5–5.3)
POTASSIUM SERPL-MCNC: 5.1 MMOL/L — SIGNIFICANT CHANGE UP (ref 3.5–5.3)
POTASSIUM SERPL-SCNC: 4.6 MMOL/L — SIGNIFICANT CHANGE UP (ref 3.5–5.3)
POTASSIUM SERPL-SCNC: 5.1 MMOL/L — SIGNIFICANT CHANGE UP (ref 3.5–5.3)
PROT SERPL-MCNC: 6.4 G/DL — SIGNIFICANT CHANGE UP (ref 6–8.3)
PROT SERPL-MCNC: 6.5 G/DL — SIGNIFICANT CHANGE UP (ref 6–8.3)
PROTHROM AB SERPL-ACNC: 14.5 SEC — HIGH (ref 10.5–13.4)
RBC # BLD: 2.94 M/UL — LOW (ref 3.8–5.2)
RBC # BLD: 2.96 M/UL — LOW (ref 3.8–5.2)
RBC # FLD: 21.4 % — HIGH (ref 10.3–14.5)
RBC # FLD: 21.9 % — HIGH (ref 10.3–14.5)
SAO2 % BLDA: 98.9 % — HIGH (ref 94–98)
SAO2 % BLDA: 99.7 % — HIGH (ref 94–98)
SODIUM SERPL-SCNC: 140 MMOL/L — SIGNIFICANT CHANGE UP (ref 135–145)
SODIUM SERPL-SCNC: 140 MMOL/L — SIGNIFICANT CHANGE UP (ref 135–145)
WBC # BLD: 13.09 K/UL — HIGH (ref 3.8–10.5)
WBC # BLD: 21.57 K/UL — HIGH (ref 3.8–10.5)
WBC # FLD AUTO: 13.09 K/UL — HIGH (ref 3.8–10.5)
WBC # FLD AUTO: 21.57 K/UL — HIGH (ref 3.8–10.5)

## 2022-05-22 PROCEDURE — 71045 X-RAY EXAM CHEST 1 VIEW: CPT | Mod: 26

## 2022-05-22 PROCEDURE — 99233 SBSQ HOSP IP/OBS HIGH 50: CPT

## 2022-05-22 RX ORDER — NYSTATIN CREAM 100000 [USP'U]/G
1 CREAM TOPICAL THREE TIMES A DAY
Refills: 0 | Status: DISCONTINUED | OUTPATIENT
Start: 2022-05-22 | End: 2022-06-01

## 2022-05-22 RX ORDER — PANTOPRAZOLE SODIUM 20 MG/1
40 TABLET, DELAYED RELEASE ORAL DAILY
Refills: 0 | Status: DISCONTINUED | OUTPATIENT
Start: 2022-05-22 | End: 2022-06-01

## 2022-05-22 RX ORDER — SODIUM CHLORIDE 9 MG/ML
1000 INJECTION, SOLUTION INTRAVENOUS
Refills: 0 | Status: DISCONTINUED | OUTPATIENT
Start: 2022-05-22 | End: 2022-06-01

## 2022-05-22 RX ORDER — DEXTROSE 50 % IN WATER 50 %
15 SYRINGE (ML) INTRAVENOUS ONCE
Refills: 0 | Status: DISCONTINUED | OUTPATIENT
Start: 2022-05-22 | End: 2022-06-01

## 2022-05-22 RX ORDER — LANOLIN/MINERAL OIL
1 LOTION (ML) TOPICAL DAILY
Refills: 0 | Status: DISCONTINUED | OUTPATIENT
Start: 2022-05-22 | End: 2022-06-01

## 2022-05-22 RX ORDER — SODIUM CHLORIDE 9 MG/ML
500 INJECTION, SOLUTION INTRAVENOUS ONCE
Refills: 0 | Status: COMPLETED | OUTPATIENT
Start: 2022-05-22 | End: 2022-05-22

## 2022-05-22 RX ORDER — DEXTROSE 50 % IN WATER 50 %
25 SYRINGE (ML) INTRAVENOUS ONCE
Refills: 0 | Status: DISCONTINUED | OUTPATIENT
Start: 2022-05-22 | End: 2022-06-01

## 2022-05-22 RX ORDER — GLUCAGON INJECTION, SOLUTION 0.5 MG/.1ML
1 INJECTION, SOLUTION SUBCUTANEOUS ONCE
Refills: 0 | Status: DISCONTINUED | OUTPATIENT
Start: 2022-05-22 | End: 2022-06-01

## 2022-05-22 RX ORDER — DEXTROSE 50 % IN WATER 50 %
12.5 SYRINGE (ML) INTRAVENOUS ONCE
Refills: 0 | Status: DISCONTINUED | OUTPATIENT
Start: 2022-05-22 | End: 2022-06-01

## 2022-05-22 RX ORDER — SODIUM CHLORIDE 9 MG/ML
10 INJECTION INTRAMUSCULAR; INTRAVENOUS; SUBCUTANEOUS
Refills: 0 | Status: DISCONTINUED | OUTPATIENT
Start: 2022-05-22 | End: 2022-06-01

## 2022-05-22 RX ORDER — INSULIN LISPRO 100/ML
VIAL (ML) SUBCUTANEOUS EVERY 6 HOURS
Refills: 0 | Status: DISCONTINUED | OUTPATIENT
Start: 2022-05-22 | End: 2022-06-01

## 2022-05-22 RX ORDER — VASOPRESSIN 20 [USP'U]/ML
0.04 INJECTION INTRAVENOUS
Qty: 50 | Refills: 0 | Status: DISCONTINUED | OUTPATIENT
Start: 2022-05-22 | End: 2022-05-22

## 2022-05-22 RX ORDER — IPRATROPIUM/ALBUTEROL SULFATE 18-103MCG
3 AEROSOL WITH ADAPTER (GRAM) INHALATION EVERY 6 HOURS
Refills: 0 | Status: DISCONTINUED | OUTPATIENT
Start: 2022-05-22 | End: 2022-06-01

## 2022-05-22 RX ORDER — CHLORHEXIDINE GLUCONATE 213 G/1000ML
1 SOLUTION TOPICAL
Refills: 0 | Status: DISCONTINUED | OUTPATIENT
Start: 2022-05-22 | End: 2022-06-01

## 2022-05-22 RX ORDER — ACETAMINOPHEN 500 MG
650 TABLET ORAL ONCE
Refills: 0 | Status: COMPLETED | OUTPATIENT
Start: 2022-05-22 | End: 2022-05-22

## 2022-05-22 RX ORDER — LACTOBACILLUS ACIDOPHILUS 100MM CELL
1 CAPSULE ORAL DAILY
Refills: 0 | Status: DISCONTINUED | OUTPATIENT
Start: 2022-05-22 | End: 2022-06-01

## 2022-05-22 RX ORDER — INSULIN GLARGINE 100 [IU]/ML
5 INJECTION, SOLUTION SUBCUTANEOUS EVERY MORNING
Refills: 0 | Status: DISCONTINUED | OUTPATIENT
Start: 2022-05-22 | End: 2022-05-23

## 2022-05-22 RX ORDER — CALCIUM GLUCONATE 100 MG/ML
1 VIAL (ML) INTRAVENOUS ONCE
Refills: 0 | Status: COMPLETED | OUTPATIENT
Start: 2022-05-22 | End: 2022-05-22

## 2022-05-22 RX ADMIN — Medication 1 MILLIGRAM(S): at 21:22

## 2022-05-22 RX ADMIN — LINEZOLID 300 MILLIGRAM(S): 600 INJECTION, SOLUTION INTRAVENOUS at 06:14

## 2022-05-22 RX ADMIN — HEPARIN SODIUM 5000 UNIT(S): 5000 INJECTION INTRAVENOUS; SUBCUTANEOUS at 06:13

## 2022-05-22 RX ADMIN — Medication 6: at 06:16

## 2022-05-22 RX ADMIN — CHLORHEXIDINE GLUCONATE 15 MILLILITER(S): 213 SOLUTION TOPICAL at 17:24

## 2022-05-22 RX ADMIN — Medication 650 MILLIGRAM(S): at 23:40

## 2022-05-22 RX ADMIN — Medication 1 APPLICATION(S): at 14:17

## 2022-05-22 RX ADMIN — PROPOFOL 3.21 MICROGRAM(S)/KG/MIN: 10 INJECTION, EMULSION INTRAVENOUS at 01:56

## 2022-05-22 RX ADMIN — LINEZOLID 300 MILLIGRAM(S): 600 INJECTION, SOLUTION INTRAVENOUS at 17:51

## 2022-05-22 RX ADMIN — Medication 2: at 23:41

## 2022-05-22 RX ADMIN — HEPARIN SODIUM 5000 UNIT(S): 5000 INJECTION INTRAVENOUS; SUBCUTANEOUS at 21:22

## 2022-05-22 RX ADMIN — PANTOPRAZOLE SODIUM 40 MILLIGRAM(S): 20 TABLET, DELAYED RELEASE ORAL at 11:37

## 2022-05-22 RX ADMIN — NYSTATIN CREAM 1 APPLICATION(S): 100000 CREAM TOPICAL at 23:59

## 2022-05-22 RX ADMIN — Medication 100 GRAM(S): at 03:10

## 2022-05-22 RX ADMIN — SODIUM CHLORIDE 500 MILLILITER(S): 9 INJECTION, SOLUTION INTRAVENOUS at 08:01

## 2022-05-22 RX ADMIN — FENTANYL CITRATE 0.54 MICROGRAM(S)/KG/HR: 50 INJECTION INTRAVENOUS at 01:57

## 2022-05-22 RX ADMIN — HEPARIN SODIUM 5000 UNIT(S): 5000 INJECTION INTRAVENOUS; SUBCUTANEOUS at 14:17

## 2022-05-22 RX ADMIN — PROPOFOL 3.21 MICROGRAM(S)/KG/MIN: 10 INJECTION, EMULSION INTRAVENOUS at 06:11

## 2022-05-22 RX ADMIN — INSULIN GLARGINE 5 UNIT(S): 100 INJECTION, SOLUTION SUBCUTANEOUS at 08:18

## 2022-05-22 RX ADMIN — MEROPENEM 100 MILLIGRAM(S): 1 INJECTION INTRAVENOUS at 02:00

## 2022-05-22 RX ADMIN — CHLORHEXIDINE GLUCONATE 15 MILLILITER(S): 213 SOLUTION TOPICAL at 06:11

## 2022-05-22 RX ADMIN — CHLORHEXIDINE GLUCONATE 1 APPLICATION(S): 213 SOLUTION TOPICAL at 06:14

## 2022-05-22 RX ADMIN — VASOPRESSIN 2.4 UNIT(S)/MIN: 20 INJECTION INTRAVENOUS at 01:57

## 2022-05-22 RX ADMIN — Medication 2: at 11:42

## 2022-05-22 NOTE — PROVIDER CONTACT NOTE (EICU) - BACKGROUND
76F with a history of chronic resp failure s/p trach/peg, dementia, frequent admissions admitted for acute hypoxia.  As per ICU PA, pt is on FiO2 100%, high PEEP, but ABG showed WBC 20 with 13% bands, RYAN, Albumin 2.  ABG showed 7.39/50/48/81%  CT showed bilateral consolidations

## 2022-05-22 NOTE — PATIENT PROFILE ADULT - FALL HARM RISK - HARM RISK INTERVENTIONS
Assistance with ambulation/Assistance OOB with selected safe patient handling equipment/Communicate Risk of Fall with Harm to all staff/Discuss with provider need for PT consult/Monitor gait and stability/Reinforce activity limits and safety measures with patient and family/Tailored Fall Risk Interventions/Visual Cue: Yellow wristband and red socks/Bed in lowest position, wheels locked, appropriate side rails in place/Call bell, personal items and telephone in reach/Instruct patient to call for assistance before getting out of bed or chair/Non-slip footwear when patient is out of bed/Ethel to call system/Physically safe environment - no spills, clutter or unnecessary equipment/Purposeful Proactive Rounding/Room/bathroom lighting operational, light cord in reach

## 2022-05-22 NOTE — PROGRESS NOTE ADULT - SUBJECTIVE AND OBJECTIVE BOX
BREA BECKHAM     [facility] 04    Allergies    codeine (Hives)    Intolerances        PAST MEDICAL & SURGICAL HISTORY:  Dementia of frontal lobe type      Aphasic stroke      Diabetes mellitus      Respiratory failure      Hypertension      GERD (gastroesophageal reflux disease)      Constipation      Respiratory failure      CVA (cerebral vascular accident)      HTN (hypertension)      DM (diabetes mellitus)      Advanced dementia      COVID-19 virus detected      Quadriplegia      Pneumonia      Type II diabetes mellitus      Hx of appendectomy      Gastrostomy in place      Tracheostomy in place      Tracheostomy tube present      Feeding by G-tube          FAMILY HISTORY:  No pertinent family history in first degree relatives        Home Medications:  albuterol 90 mcg/inh inhalation aerosol with adapter: 2  inhaled every 6 hours (21 May 2022 21:16)  Bacid (LAC) oral tablet: 2 tab(s) by gastrostomy tube once a day (21 May 2022 21:16)  Basaglar KwikPen 100 units/mL subcutaneous solution: 36 unit(s) subcutaneous once a day (at bedtime) (21 May 2022 21:16)  Betadine 10% topical swab: cleanse right and left great toes (21 May 2022 21:16)  Carafate 1 g/10 mL oral suspension: 10 milliliter(s) by gastrostomy tube 4 times a day (before meals and at bedtime) for 14 days (Started 21) (21 May 2022 21:16)  chlorhexidine 0.12% mucous membrane liquid: 15 milliliter(s) mucous membrane 2 times a day (21 May 2022 21:16)  Eucerin topical cream: Apply topically to affected area once a day bilateral feet (21 May 2022 21:16)  folic acid 1 mg oral tablet: 1 tab(s) orally once a day (21 May 2022 21:16)  ipratropium-albuterol 0.5 mg-2.5 mg/3 mL inhalation solution: 3 milliliter(s) inhaled 4 times a day (21 May 2022 21:16)  LORazepam 1 mg oral tablet: 1 tab(s) by gastrostomy tube every 4 hours (21 May 2022 21:16)  methocarbamol 500 mg oral tablet: 1 tab(s) by gastrostomy tube 2 times a day (21 May 2022 21:16)  MiraLax oral powder for reconstitution:  (21 May 2022 23:14)  Multiple Vitamins oral tablet: 1 tab(s) orally once a day (21 May 2022 21:16)  omeprazole 20 mg oral delayed release capsule: orally 2 times a day (21 May 2022 23:14)  polyethylene glycol 3350 oral powder for reconstitution: 17 gram(s) by gastrostomy tube every 12 hours (21 May 2022 21:16)  senna 8.6 mg oral tablet: 3 tab(s) by gastrostomy tube once a day (at bedtime) (21 May 2022 21:16)  simethicone 80 mg oral tablet, chewable: 1 tab(s) by gastrostomy tube every 6 hours (21 May 2022 21:16)  Tylenol 325 mg oral tablet: 2 tab(s) by gastrostomy tube once a day; 60 minutes prior to dressing change  (21 May 2022 21:16)  Tylenol 325 mg oral tablet: 2 tab(s) by gastrostomy tube every 6 hours, As Needed (21 May 2022 21:16)  Zosyn:  (21 May 2022 23:14)      MEDICATIONS  (STANDING):  chlorhexidine 0.12% Liquid 15 milliLiter(s) Oral Mucosa every 12 hours  chlorhexidine 2% Cloths 1 Application(s) Topical <User Schedule>  chlorhexidine 4% Liquid 1 Application(s) Topical <User Schedule>  dextrose 5%. 1000 milliLiter(s) (50 mL/Hr) IV Continuous <Continuous>  dextrose 5%. 1000 milliLiter(s) (100 mL/Hr) IV Continuous <Continuous>  dextrose 50% Injectable 25 Gram(s) IV Push once  dextrose 50% Injectable 12.5 Gram(s) IV Push once  dextrose 50% Injectable 25 Gram(s) IV Push once  fentaNYL   Infusion..... 0.5 MICROgram(s)/kG/Hr (0.54 mL/Hr) IV Continuous <Continuous>  glucagon  Injectable 1 milliGRAM(s) IntraMuscular once  heparin   Injectable 5000 Unit(s) SubCutaneous every 8 hours  insulin glargine Injectable (LANTUS) 5 Unit(s) SubCutaneous every morning  insulin lispro (ADMELOG) corrective regimen sliding scale   SubCutaneous every 6 hours  lactobacillus acidophilus 1 Tablet(s) Oral daily  linezolid  IVPB 600 milliGRAM(s) IV Intermittent every 12 hours  meropenem  IVPB 500 milliGRAM(s) IV Intermittent every 12 hours  mineral oil/petrolatum Hydrophilic Ointment 1 Application(s) Topical daily  norepinephrine Infusion 0.05 MICROgram(s)/kG/Min (5.02 mL/Hr) IV Continuous <Continuous>  pantoprazole  Injectable 40 milliGRAM(s) IV Push daily  propofol Infusion 10 MICROgram(s)/kG/Min (3.21 mL/Hr) IV Continuous <Continuous>    MEDICATIONS  (PRN):  albuterol/ipratropium for Nebulization 3 milliLiter(s) Nebulizer every 6 hours PRN Shortness of Breath and/or Wheezing  dextrose Oral Gel 15 Gram(s) Oral once PRN Blood Glucose LESS THAN 70 milliGRAM(s)/deciliter  sodium chloride 0.9% lock flush 10 milliLiter(s) IV Push every 1 hour PRN Pre/post blood products, medications, blood draw, and to maintain line patency              Vital Signs Last 24 Hrs  T(C): 36.1 (22 May 2022 04:00), Max: 37.9 (21 May 2022 19:59)  T(F): 97 (22 May 2022 04:00), Max: 100.3 (21 May 2022 19:59)  HR: 58 (22 May 2022 10:04) (52 - 89)  BP: 86/59 (22 May 2022 10:04) (70/46 - 169/74)  BP(mean): 69 (22 May 2022 10:04) (51 - 777)  RR: 26 (22 May 2022 10:04) (20 - 38)  SpO2: 100% (22 May 2022 10:04) (82% - 100%)      22 @ 07:01  -  22 @ 07:00  --------------------------------------------------------  IN: 578.1 mL / OUT: 200 mL / NET: 378.1 mL    22 @ 07:01  -  22 @ 10:18  --------------------------------------------------------  IN: 518 mL / OUT: 150 mL / NET: 368 mL        Mode: AC/ CMV (Assist Control/ Continuous Mandatory Ventilation), RR (machine): 26, TV (machine): 400, FiO2: 75, PEEP: 12, ITime: 1, MAP: 25, PIP: 42      LABS:                        7.2    13.09 )-----------( 332      ( 22 May 2022 06:23 )             25.7         140  |  102  |  61<H>  ----------------------------<  294<H>  4.6   |  31  |  1.59<H>    Ca    8.2<L>      22 May 2022 06:23  Phos  2.9       Mg     3.3         TPro  6.4  /  Alb  2.0<L>  /  TBili  0.6  /  DBili  x   /  AST  184<H>  /  ALT  150<H>  /  AlkPhos  201<H>      PT/INR - ( 22 May 2022 01:22 )   PT: 14.5 sec;   INR: 1.23 ratio         PTT - ( 22 May 2022 01:22 )  PTT:34.5 sec  Urinalysis Basic - ( 21 May 2022 20:51 )    Color: Yellow / Appearance: Clear / S.010 / pH: x  Gluc: x / Ketone: Negative  / Bili: Negative / Urobili: Negative mg/dL   Blood: x / Protein: 100 mg/dL / Nitrite: Negative   Leuk Esterase: Moderate / RBC: 0-2 /HPF / WBC 3-5   Sq Epi: x / Non Sq Epi: Occasional / Bacteria: x        ABG - ( 22 May 2022 05:36 )  pH, Arterial: 7.44  pH, Blood: x     /  pCO2: 41    /  pO2: 117   / HCO3: 28    / Base Excess: 3.6   /  SaO2: 98.9                WBC:  WBC Count: 13.09 K/uL ( @ 06:23)  WBC Count: 21.57 K/uL ( @ 01:22)  WBC Count: 20.49 K/uL ( @ 22:30)  WBC Count: 22.94 K/uL ( @ 20:28)      MICROBIOLOGY:  RECENT CULTURES:              PT/INR - ( 22 May 2022 01:22 )   PT: 14.5 sec;   INR: 1.23 ratio         PTT - ( 22 May 2022 01:22 )  PTT:34.5 sec    Sodium:  Sodium, Serum: 140 mmol/L ( @ :23)  Sodium, Serum: 140 mmol/L (:)  Sodium, Serum: 140 mmol/L (:30)  Sodium, Serum: 141 mmol/L (:28)      1.59 mg/dL :23  1.72 mg/dL :  1.65 mg/dL :30  1.62 mg/dL :28      Hemoglobin:  Hemoglobin: 7.2 g/dL (:)  Hemoglobin: 7.3 g/dL (:)  Hemoglobin: 8.0 g/dL (:30)  Hemoglobin: 8.1 g/dL (:28)      Platelets: Platelet Count - Automated: 332 K/uL ( @ :)  Platelet Count - Automated: 423 K/uL (:)  Platelet Count - Automated: 331 K/uL ( 22:30)  Platelet Count - Automated: 383 K/uL ( @ 20:28)      LIVER FUNCTIONS - ( 22 May 2022 06:23 )  Alb: 2.0 g/dL / Pro: 6.4 g/dL / ALK PHOS: 201 U/L / ALT: 150 U/L DA / AST: 184 U/L / GGT: x             Urinalysis Basic - ( 21 May 2022 20:51 )    Color: Yellow / Appearance: Clear / S.010 / pH: x  Gluc: x / Ketone: Negative  / Bili: Negative / Urobili: Negative mg/dL   Blood: x / Protein: 100 mg/dL / Nitrite: Negative   Leuk Esterase: Moderate / RBC: 0-2 /HPF / WBC 3-5   Sq Epi: x / Non Sq Epi: Occasional / Bacteria: x        RADIOLOGY & ADDITIONAL STUDIES:      MICROBIOLOGY:  RECENT CULTURES:

## 2022-05-22 NOTE — DIETITIAN INITIAL EVALUATION ADULT - OTHER INFO
Per H&P, pt is a "78 y/o F with PMH of HTN, DM type 2, Cardiac Arrest, CVA, COPD, Chronic Respiratory Failure Vent Dependant s/p trach & PEG, Multiple Hospital Admissions for Aspiration & vent associated PNA, COVID-19 Infection, Anemia with GI blood loss, GERD, and Advanced Dementia who was sent from [facility] facility for acute respiratory distress with unknown onset, no reported fever or chills. On arrival to ED she was found with tachypnea & dropping of her SPO2 on same vent settings, CT chest without contrast revealed worse B/L PNA & B/L pleural effusion compared with her CT one month ago. Patient was discharged from hospital 3 weeks ago after admission for a similar condition, No history could obtained given her status."    Nutrition consult ordered for pressure ulcer stage II or >. Pt admitted with stage II to sacrum, stage III to R buttock, SDTI to R lateral foot and L foot.  Pt with hx trach/PEG.  Per transfer documents, receiving Glucerna 1.5 to goal 60ml/hr x 20hrs (provides 1200ml TV formula, 1800kcal, 99g protein; also receiving 250ml free water q 6hrs, +25ml hourly flushes). No active tube feeding ordered. As medically feasible, recommend Glucerna 1.5, start at 20ml/hr increase by 10ml every 6 hours until goal 60ml/hr x 20hrs is reached (to provide 1200 ml TV formula, 1800kcal based on 31kcal/kg IBW, 99g protein based on 1.7g/kg IBW, 912ml free water from formula); recommend standard 25ml/hr free water flushes. CBW on admission 117#. Previous adm weight of 119#. No edema noted. +BM 5/21. RD to follow closely and will continue to monitor pt's nutrition status.

## 2022-05-22 NOTE — DIETITIAN INITIAL EVALUATION ADULT - ORAL INTAKE PTA/DIET HISTORY
Pt admitted from [facility]. Reviewed transfer documents; pt was receiving tube feeds of Glucerna 1.5 via peg to goal 60ml/hr x 20hrs (provides 1200ml TV formula, 1800kcal, 99g protein; also receiving 250ml free water q 6hrs, +25ml hourly flushes). NKFA.

## 2022-05-22 NOTE — CHART NOTE - NSCHARTNOTEFT_GEN_A_CORE
Reviewed again with EICU Dr. Andino.    Patient vent compliant sedated on propofol without paralytic.  Vent setting /26/12/100 ABG 7.38, po2 102  At this time will hold off on paralytic given compliance    If patient becomes dyssynchronous, low threshold to start.   Will r/p ABG in am and titrate settings based on results.     Patient becoming more hypotensive with sedation, will add vasopressin as adjunct to levophed.

## 2022-05-22 NOTE — PROGRESS NOTE ADULT - ASSESSMENT
REVIEW OF SYMPTOMS      Able to give (reliable) ROS  NO     PHYSICAL EXAM    HEENT Unremarkable  atraumatic   RESP Fair air entry EXP prolonged    Harsh breath sound Resp distres mild   CARDIAC S1 S2 No S3     NO JVD    ABDOMEN SOFT BS PRESENT NOT DISTENDED No hepatosplenomegaly   PEDAL EDEMA present No calf tenderness  NO rash       AGE/SEX.   76 f    DOA.  5/21/2022     CC .  5/21/2022 AOC RESP FAILURE     PMH .  pmh Hosp stay given zosyn 4/21  pmh Pneumonia    pmh HTN,   pmh DM,   pmh CVA,   pmh  chronic respiratory failure   pmh s/p trach, PEG,   pmh cardiac arrest      MAIN ISSUES  .  SEVERE HYPOXIC RESP FAILURE REQUIRING 100% FIO2 PEEP 15 poa 5/21/2022  VENT DYSSYNCHRONUY 5/22/2022  NMBA 5/21/2022   PROP 5/21/2022  FENT 5/21/2022   MECHNAICAL VENT 5/21/2022   VAP  BL PL EFFSNS   CHF  PULM HYTN       COVID/ICU/CODE STATUS.                       COVID  STATUS. 5/21/2022 scv2 (-)           ICU STAY. none  GOC.      BEST PRACTICE ISSUES.                                                  HEAD OF BED ELEVATION. Yes  DVT PROPHYLAXIS.     5/21/2022 hpsc    SQUIRES PROPHYLAXIS.5/22/2022 protonix 40   INFECTION PROPHYLAXIS.   5/21/2022 Ch;lorhexidine .12%   5/21/2022 Chlorhexidine 2%                                                                                         DIET.   5/2/12022 npo     VITALS/PO/IO/VENT/DRIPS.     5/22/2022 afeb 68 100/60  5/22/2022 u 1300   5/22/2022 ac 26/400/12/.75       PROBLEM DATA/ASSESSMENT RECOMMENDATIONS (A/R).    HEMODYNAMICS.   Monitor and optimize bp   Target MAP to miguel  65 (+)    RESP.   Monitor and optimize  po   Target po to remain 90-95%    ABG.  5/22/2022 5a vent 90% 744/41/117   5/21/2022 8p 100% vent 739/50/48    PMH/PSH PROBLEMS.  Management continued/modified as indicated      VENT MANAGEMENT.   HOB elevation  Target Pplat 30 (-)  Target PO 90-95%  Target pH 730 (+)  Daily spontaneous breathing trials   Daily sedation vacation         INFECTION SOURCE DD.   INFECTION A/R.   Has ho crp   Has ho iv abio within last 90 d  Is on bsab   id eval appreciated   Started on ceftazidime avibactam 1.25x2 5/22/2022    INFECTION AUGUST.  W 5/21-5/22/2022 w 20 - 13   ua 5/21/2022 w 3-5   ct ch 5/21/2022 new bl ggo patchy cpnsolidatnand septal thickening of uncertain etiology   No change bl effsns l greater   Near complete consolidatn both lower loobes   MICROBIO.  Rvp 5/21/2022 (-)   PAST MICROBIO.  4/24/2022 sputum CRABAPENEM RESISTANT PSEUDOMONAS  ABIO.  5/22/2022 ceftazidime avibactam 1.25x2   5/21/2022 linezolid    LACTICEMIA.  LA 5/22-5/22 la 3.4-1.6    SHOCK poa.   Weaned off norepi    SEVERE HYPOXIC RESP FAILURE poa.   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  5/22/2022 duoneb     PLEURAL EFFSNS.  echo 9/8/2021   n lvsf   mild dd   n rvsf   rvsp 51   ct 4/22/2022 ct  4/21/2022   bl effsns increased in size cw 1/2022  A/R   Cause of pl effsns may nbe sec r heart failure   Will consider diagnotic thoracentesis when more stable       CHF.  echo 9/8/2021   n lvsf   mild dd   n rvsf   rvsp 51   bnp 5/21/2022 bnp 20702 5/22/2022 cardio consulted    ANEMIA.   Hb 5/21-5/22/2022 Hb 8 - 7.2   monitor  target hb 7 (+)     RYAN.  Na 5/21/2022 Na 140  CO2 5/21/2022 CO2 30   Cr 5/21-5/22/2022 Cr 1.6 - 1.5   monitor     ELEVATED LFTS.  LFTS 5/22/2022         A/R   5/22/2022 check us abd   5/22/2022 check hep panel        TIME SPENT   Over 36  minutes aggregate critical care time spent on encounter; activities included   direct patient care, counseling and/or coordinating care reviewing notes, lab data/ imaging , discussion with multidisciplinary team/ patient  /family and explaining in detail risks, benefits, alternatives  of the recommendations     CHAPINCITO GUIDRY 76 f [facility] S 5/21/2022

## 2022-05-22 NOTE — DIETITIAN INITIAL EVALUATION ADULT - NSFNSPHYEXAMSKINFT_GEN_A_CORE
Pressure Injury 1: sacrum, Stage II  Pressure Injury 2: Right:,buttocks, Stage III  Pressure Injury 3: Right:,lateral,side of 5th toe, Suspected deep tissue injury  Pressure Injury 4: Left:,foot, Suspected deep tissue injury  Pressure Injury 5: none, none  Pressure Injury 6: none, none  Pressure Injury 7: none, none  Pressure Injury 8: none, none  Pressure Injury 9: none, none  Pressure Injury 10: none, none  Pressure Injury 11: none, none

## 2022-05-22 NOTE — DIETITIAN INITIAL EVALUATION ADULT - ENTERAL
As medically feasible, initiate tube feeds via peg of Glucerna 1.5 to goal rate of 60ml/hr x 20 hours

## 2022-05-22 NOTE — PROGRESS NOTE ADULT - SUBJECTIVE AND OBJECTIVE BOX
Patient is a 76y old  Female who presents with a chief complaint of Acute respiratory distress. (22 May 2022 14:39)    BRIEF HOSPITAL COURSE:   77 yo female with a pmh of Chronic resp failure s/p trach and peg, HTN, T2DM, CVA, GERD, and dementia admitted w/ Acute on Chronic respiratory failure, Septic Shock, UTI, PNA, b/l Pleural effusion, RYAN.    Events last 24 hours:   -Weaned off Levophed infusion, MAP >65. Weaned off sedation. Currently at baseline mentation.   -SCr, leukocytosis downtrending. Lactate cleared.   -Remains on full mechanical support. (26 / 400 / +12 / 75%), satting high 90s. Titrated to +10/70%. Synchronous w/ ventilator.   -Afebrile.     PAST MEDICAL & SURGICAL HISTORY:  Dementia of frontal lobe type  Aphasic stroke  Diabetes mellitus  Respiratory failure  Hypertension  GERD (gastroesophageal reflux disease)  Constipation  Respiratory failure  CVA (cerebral vascular accident)  HTN (hypertension)  DM (diabetes mellitus)  Advanced dementia  COVID-19 virus detected  Quadriplegia  Pneumonia  Type II diabetes mellitus  Hx of appendectomy  Gastrostomy in place  Tracheostomy in place  Tracheostomy tube present  Feeding by G-tube    Due to pt's baseline mentation, subjective information was not able to be obtained from the patient. History was obtained, to the extent possible, from review of the chart and collateral sources of information.     Medications:  ceftazidime/avibactam IVPB 1.25 Gram(s) IV Intermittent every 12 hours  linezolid  IVPB 600 milliGRAM(s) IV Intermittent every 12 hours  norepinephrine Infusion 0.05 MICROgram(s)/kG/Min IV Continuous <Continuous>  albuterol/ipratropium for Nebulization 3 milliLiter(s) Nebulizer every 6 hours PRN  fentaNYL   Infusion..... 0.5 MICROgram(s)/kG/Hr IV Continuous <Continuous>  LORazepam     Tablet 1 milliGRAM(s) Oral every 4 hours  LORazepam   Injectable 1 milliGRAM(s) IV Push every 6 hours PRN  propofol Infusion 10 MICROgram(s)/kG/Min IV Continuous <Continuous>  heparin   Injectable 5000 Unit(s) SubCutaneous every 8 hours  pantoprazole  Injectable 40 milliGRAM(s) IV Push daily  dextrose 50% Injectable 25 Gram(s) IV Push once  dextrose 50% Injectable 12.5 Gram(s) IV Push once  dextrose 50% Injectable 25 Gram(s) IV Push once  dextrose Oral Gel 15 Gram(s) Oral once PRN  glucagon  Injectable 1 milliGRAM(s) IntraMuscular once  insulin glargine Injectable (LANTUS) 5 Unit(s) SubCutaneous every morning  insulin lispro (ADMELOG) corrective regimen sliding scale   SubCutaneous every 6 hours  dextrose 5%. 1000 milliLiter(s) IV Continuous <Continuous>  dextrose 5%. 1000 milliLiter(s) IV Continuous <Continuous>  sodium chloride 0.9% lock flush 10 milliLiter(s) IV Push every 1 hour PRN  chlorhexidine 0.12% Liquid 15 milliLiter(s) Oral Mucosa every 12 hours  chlorhexidine 2% Cloths 1 Application(s) Topical <User Schedule>  chlorhexidine 4% Liquid 1 Application(s) Topical <User Schedule>  mineral oil/petrolatum Hydrophilic Ointment 1 Application(s) Topical daily  lactobacillus acidophilus 1 Tablet(s) Oral daily    Mode: AC/ CMV (Assist Control/ Continuous Mandatory Ventilation)  RR (machine): 26  TV (machine): 400  FiO2: 75  PEEP: 12  ITime: 1  MAP: 23  PIP: 38    ICU Vital Signs Last 24 Hrs  T(C): 37.1 (22 May 2022 16:14), Max: 37.1 (22 May 2022 16:14)  T(F): 98.7 (22 May 2022 16:14), Max: 98.7 (22 May 2022 16:14)  HR: 75 (22 May 2022 18:09) (52 - 89)  BP: 106/56 (22 May 2022 17:00) (70/46 - 169/74)  BP(mean): 72 (22 May 2022 17:00) (51 - 777)  ABP: 138/62 (22 May 2022 18:09) (58/34 - 178/43)  ABP(mean): 88 (22 May 2022 18:09) (20 - 111)  RR: 26 (22 May 2022 18:09) (20 - 38)  SpO2: 93% (22 May 2022 18:09) (82% - 100%)    ABG - ( 22 May 2022 05:36 )  pH, Arterial: 7.44  pH, Blood: x     /  pCO2: 41    /  pO2: 117   / HCO3: 28    / Base Excess: 3.6   /  SaO2: 98.9      I&O's Detail  21 May 2022 07:01  -  22 May 2022 07:00  --------------------------------------------------------  IN:    FentaNYL: 2.5 mL    IV PiggyBack: 400 mL    Norepinephrine: 78.5 mL    Propofol: 87.5 mL    Vasopressin: 9.6 mL  Total IN: 578.1 mL  OUT:    Indwelling Catheter - Urethral (mL): 200 mL  Total OUT: 200 mL  Total NET: 378.1 mL    22 May 2022 07:01  -  22 May 2022 20:04  --------------------------------------------------------  IN:    FentaNYL: 4 mL    IV PiggyBack: 500 mL    Jevity 1.5: 40 mL    Norepinephrine: 19 mL    Propofol: 32 mL  Total IN: 595 mL  OUT:    Indwelling Catheter - Urethral (mL): 1300 mL  Total OUT: 1300 mL  Total NET: -705 mL    LABS:                        7.2    13.09 )-----------( 332      ( 22 May 2022 06:23 )             25.7     05-22  140  |  102  |  61<H>  ----------------------------<  294<H>  4.6   |  31  |  1.59<H>  Ca    8.2<L>      22 May 2022 06:23  Phos  2.9       Mg     3.3       TPro  6.4  /  Alb  2.0<L>  /  TBili  0.6  /  DBili  x   /  AST  184<H>  /  ALT  150<H>  /  AlkPhos  201<H>      CAPILLARY BLOOD GLUCOSE  POCT Blood Glucose.: 116 mg/dL (22 May 2022 17:23)    PT/INR - ( 22 May 2022 01:22 )   PT: 14.5 sec;   INR: 1.23 ratio    PTT - ( 22 May 2022 01:22 )  PTT:34.5 sec    Urinalysis Basic - ( 21 May 2022 20:51 )  Color: Yellow / Appearance: Clear / S.010 / pH: x  Gluc: x / Ketone: Negative  / Bili: Negative / Urobili: Negative mg/dL   Blood: x / Protein: 100 mg/dL / Nitrite: Negative   Leuk Esterase: Moderate / RBC: 0-2 /HPF / WBC 3-5   Sq Epi: x / Non Sq Epi: Occasional / Bacteria: x    CULTURES:  Rapid RVP Result: NotDetec (22 @ 20:51)      Physical Examination:  General: Elderly female in bed, chronically ill appearing.  HEENT: PERRL.  PULM: Decreased BS at bases b/l.    CVS: +S1/s2.  ABD: Soft, nondistended, nontender, normoactive bowel sounds. +PEG tube.  EXT: UE contracted b/l. LE no edema, nontender.   SKIN: Warm.      RADIOLOGY:   < from: CT Chest No Cont (22 @ 23:49) >  ACC: [id number] EXAM:  CT CHEST                        PROCEDURE DATE:  2022    IMPRESSION:  1.  New bilateral groundglass opacities, patchy consolidation, and septal   thickening are of uncertain etiology.  2.  No change in the bilateral pleural effusions (left greater than   right), the near complete consolidation of the left lower lobe and right   lower lobe        77 yo female with a pmh of Chronic resp failure s/p trach and peg, HTN, T2DM, CVA, GERD, and dementia admitted w/ Acute on Chronic respiratory failure, Septic Shock, UTI, PNA, b/l Pleural effusion, RYAN.  Plan:  NEURO:  -Weaned off Propofol/Fentanyl. Currently at baseline mentation.   -Home Ativan 1mg q4h PO via PEG, Ativan 1mg q6h IVP PRN added.     CV:  -Weaned off Levophed infusion, MAP >65. Low threshold to restart vasopressors to maintain goal MAP.  -Keep K~4 and Mg>2 for optimal arrhythmia suppression.     RESP:  -Patient currently on Full vent support, synchronous w/ ventilator. Settings weaned to +10/70%.  -titrate settings to maintain SaO2 >90%, or pH >7.25  -consider low tidal volume ventilation strategy w/ goal Tv 4-6 cc/kg of ideal body weight  -plateu pressure goal <30  -Peridex oral care  -aggressive pulmonary toilet    RENAL:  -Renal function currently w/ RYAN, SCr downtrending.   -trend lytes/Scr daily with BMP  -I's and O's, goal UOP 0.5 cc/kg/hr  -renal dose meds and avoid nephrotoxins     GI:  -TF.  -Protonix QD.     ENDO:  -Aggressive glycemic control to limit FS glucose to <180mg/dl. ISS, Lantus.     ID:  -Afebrile, leukocytosis improving, Lactate cleared. UA+. SputumCx w/ Carb-resistant Pseudo + Serratia. BloodCx, UrineCx pending. Check MRSA/MSSA. Linezolid, Avycaz. ID following.     HEME:  -DVT ppx w/ SC Heparin.     TIME SPENT: 36 minutes  Evaluating/treating patient, reviewing data/labs/imaging, discussing case with multidisciplinary team, discussing plan/goals of care with patient/family. Non-inclusive of procedure time.   Patient is a 76y old  Female who presents with a chief complaint of Acute respiratory distress. (22 May 2022 14:39)    BRIEF HOSPITAL COURSE:   77 yo female with a pmh of Chronic resp failure s/p trach and peg, HTN, T2DM, CVA, GERD, and dementia admitted w/ Acute on Chronic respiratory failure, Septic Shock, UTI, PNA, b/l Pleural effusion, RYAN.    Events last 24 hours:   -Weaned off Levophed infusion, MAP >65. Weaned off sedation. Currently at baseline mentation.   -SCr, leukocytosis downtrending. Lactate cleared.   -Remains on full mechanical support. (26 / 400 / +12 / 75%), satting high 90s. Titrated to +10/70%. Synchronous w/ ventilator.   -Afebrile.     PAST MEDICAL & SURGICAL HISTORY:  Dementia of frontal lobe type  Aphasic stroke  Diabetes mellitus  Respiratory failure  Hypertension  GERD (gastroesophageal reflux disease)  Constipation  Respiratory failure  CVA (cerebral vascular accident)  HTN (hypertension)  DM (diabetes mellitus)  Advanced dementia  COVID-19 virus detected  Quadriplegia  Pneumonia  Type II diabetes mellitus  Hx of appendectomy  Gastrostomy in place  Tracheostomy in place  Tracheostomy tube present  Feeding by G-tube    Due to pt's baseline mentation, subjective information was not able to be obtained from the patient. History was obtained, to the extent possible, from review of the chart and collateral sources of information.     Medications:  ceftazidime/avibactam IVPB 1.25 Gram(s) IV Intermittent every 12 hours  linezolid  IVPB 600 milliGRAM(s) IV Intermittent every 12 hours  norepinephrine Infusion 0.05 MICROgram(s)/kG/Min IV Continuous <Continuous>  albuterol/ipratropium for Nebulization 3 milliLiter(s) Nebulizer every 6 hours PRN  fentaNYL   Infusion..... 0.5 MICROgram(s)/kG/Hr IV Continuous <Continuous>  LORazepam     Tablet 1 milliGRAM(s) Oral every 4 hours  LORazepam   Injectable 1 milliGRAM(s) IV Push every 6 hours PRN  propofol Infusion 10 MICROgram(s)/kG/Min IV Continuous <Continuous>  heparin   Injectable 5000 Unit(s) SubCutaneous every 8 hours  pantoprazole  Injectable 40 milliGRAM(s) IV Push daily  dextrose 50% Injectable 25 Gram(s) IV Push once  dextrose 50% Injectable 12.5 Gram(s) IV Push once  dextrose 50% Injectable 25 Gram(s) IV Push once  dextrose Oral Gel 15 Gram(s) Oral once PRN  glucagon  Injectable 1 milliGRAM(s) IntraMuscular once  insulin glargine Injectable (LANTUS) 5 Unit(s) SubCutaneous every morning  insulin lispro (ADMELOG) corrective regimen sliding scale   SubCutaneous every 6 hours  dextrose 5%. 1000 milliLiter(s) IV Continuous <Continuous>  dextrose 5%. 1000 milliLiter(s) IV Continuous <Continuous>  sodium chloride 0.9% lock flush 10 milliLiter(s) IV Push every 1 hour PRN  chlorhexidine 0.12% Liquid 15 milliLiter(s) Oral Mucosa every 12 hours  chlorhexidine 2% Cloths 1 Application(s) Topical <User Schedule>  chlorhexidine 4% Liquid 1 Application(s) Topical <User Schedule>  mineral oil/petrolatum Hydrophilic Ointment 1 Application(s) Topical daily  lactobacillus acidophilus 1 Tablet(s) Oral daily    Mode: AC/ CMV (Assist Control/ Continuous Mandatory Ventilation)  RR (machine): 26  TV (machine): 400  FiO2: 75  PEEP: 12  ITime: 1  MAP: 23  PIP: 38    ICU Vital Signs Last 24 Hrs  T(C): 37.1 (22 May 2022 16:14), Max: 37.1 (22 May 2022 16:14)  T(F): 98.7 (22 May 2022 16:14), Max: 98.7 (22 May 2022 16:14)  HR: 75 (22 May 2022 18:09) (52 - 89)  BP: 106/56 (22 May 2022 17:00) (70/46 - 169/74)  BP(mean): 72 (22 May 2022 17:00) (51 - 777)  ABP: 138/62 (22 May 2022 18:09) (58/34 - 178/43)  ABP(mean): 88 (22 May 2022 18:09) (20 - 111)  RR: 26 (22 May 2022 18:09) (20 - 38)  SpO2: 93% (22 May 2022 18:09) (82% - 100%)    ABG - ( 22 May 2022 05:36 )  pH, Arterial: 7.44  pH, Blood: x     /  pCO2: 41    /  pO2: 117   / HCO3: 28    / Base Excess: 3.6   /  SaO2: 98.9      I&O's Detail  21 May 2022 07:01  -  22 May 2022 07:00  --------------------------------------------------------  IN:    FentaNYL: 2.5 mL    IV PiggyBack: 400 mL    Norepinephrine: 78.5 mL    Propofol: 87.5 mL    Vasopressin: 9.6 mL  Total IN: 578.1 mL  OUT:    Indwelling Catheter - Urethral (mL): 200 mL  Total OUT: 200 mL  Total NET: 378.1 mL    22 May 2022 07:01  -  22 May 2022 20:04  --------------------------------------------------------  IN:    FentaNYL: 4 mL    IV PiggyBack: 500 mL    Jevity 1.5: 40 mL    Norepinephrine: 19 mL    Propofol: 32 mL  Total IN: 595 mL  OUT:    Indwelling Catheter - Urethral (mL): 1300 mL  Total OUT: 1300 mL  Total NET: -705 mL    LABS:                        7.2    13.09 )-----------( 332      ( 22 May 2022 06:23 )             25.7     05-22  140  |  102  |  61<H>  ----------------------------<  294<H>  4.6   |  31  |  1.59<H>  Ca    8.2<L>      22 May 2022 06:23  Phos  2.9       Mg     3.3       TPro  6.4  /  Alb  2.0<L>  /  TBili  0.6  /  DBili  x   /  AST  184<H>  /  ALT  150<H>  /  AlkPhos  201<H>      CAPILLARY BLOOD GLUCOSE  POCT Blood Glucose.: 116 mg/dL (22 May 2022 17:23)    PT/INR - ( 22 May 2022 01:22 )   PT: 14.5 sec;   INR: 1.23 ratio    PTT - ( 22 May 2022 01:22 )  PTT:34.5 sec    Urinalysis Basic - ( 21 May 2022 20:51 )  Color: Yellow / Appearance: Clear / S.010 / pH: x  Gluc: x / Ketone: Negative  / Bili: Negative / Urobili: Negative mg/dL   Blood: x / Protein: 100 mg/dL / Nitrite: Negative   Leuk Esterase: Moderate / RBC: 0-2 /HPF / WBC 3-5   Sq Epi: x / Non Sq Epi: Occasional / Bacteria: x    CULTURES:  Rapid RVP Result: NotDetec (22 @ 20:51)      Physical Examination:  General: Elderly female in bed, chronically ill appearing.  HEENT: PERRL.  PULM: Decreased BS at bases b/l.    CVS: +S1/s2.  ABD: Soft, nondistended, nontender, normoactive bowel sounds. +PEG tube.  EXT: UE contracted b/l. LE no edema, nontender.   SKIN: Warm.      RADIOLOGY:   < from: CT Chest No Cont (22 @ 23:49) >  ACC: [id number] EXAM:  CT CHEST                        PROCEDURE DATE:  2022    IMPRESSION:  1.  New bilateral groundglass opacities, patchy consolidation, and septal   thickening are of uncertain etiology.  2.  No change in the bilateral pleural effusions (left greater than   right), the near complete consolidation of the left lower lobe and right   lower lobe        77 yo female with a pmh of Chronic resp failure s/p trach and peg, HTN, T2DM, CVA, GERD, and dementia admitted w/ Acute on Chronic respiratory failure, Septic Shock, UTI, PNA, b/l Pleural effusion, RYAN.  Plan:  NEURO:  -Weaned off Propofol/Fentanyl. Currently at baseline mentation.   -Home Ativan 1mg q4h PO via PEG, Ativan 1mg q6h IVP PRN added.     CV:  -Weaned off Levophed infusion, MAP >65. Low threshold to restart vasopressors to maintain goal MAP.  -Keep K~4 and Mg>2 for optimal arrhythmia suppression.     RESP:  -Patient currently on Full vent support, synchronous w/ ventilator. Settings weaned to +10/70%.  -titrate settings to maintain SaO2 >90%, or pH >7.25  -consider low tidal volume ventilation strategy w/ goal Tv 4-6 cc/kg of ideal body weight  -plateu pressure goal <30  -Peridex oral care  -aggressive pulmonary toilet.  -Repeat ABG in AM.     RENAL:  -Renal function currently w/ RYAN, SCr downtrending.   -trend lytes/Scr daily with BMP  -I's and O's, goal UOP 0.5 cc/kg/hr  -renal dose meds and avoid nephrotoxins     GI:  -TF.  -Protonix QD.     ENDO:  -Aggressive glycemic control to limit FS glucose to <180mg/dl. ISS, Lantus.     ID:  -Afebrile, leukocytosis improving, Lactate cleared. UA+. SputumCx w/ Carb-resistant Pseudo + Serratia. BloodCx, UrineCx pending. Check MRSA/MSSA. Linezolid, Avycaz. ID following.     HEME:  -DVT ppx w/ SC Heparin.     TIME SPENT: 36 minutes  Evaluating/treating patient, reviewing data/labs/imaging, discussing case with multidisciplinary team, discussing plan/goals of care with patient/family. Non-inclusive of procedure time.

## 2022-05-22 NOTE — DIETITIAN INITIAL EVALUATION ADULT - PERTINENT LABORATORY DATA
05-22    140  |  102  |  61<H>  ----------------------------<  294<H>  4.6   |  31  |  1.59<H>    Ca    8.2<L>      22 May 2022 06:23  Phos  2.9     05-22  Mg     3.3     05-22    TPro  6.4  /  Alb  2.0<L>  /  TBili  0.6  /  DBili  x   /  AST  184<H>  /  ALT  150<H>  /  AlkPhos  201<H>  05-22  POCT Blood Glucose.: 289 mg/dL (05-22-22 @ 06:06)  A1C with Estimated Average Glucose Result: 6.4 % (04-22-22 @ 12:14)  A1C with Estimated Average Glucose Result: 6.2 % (12-09-21 @ 14:24)  A1C with Estimated Average Glucose Result: 6.6 % (09-29-21 @ 21:06)

## 2022-05-22 NOTE — CONSULT NOTE ADULT - ASSESSMENT
This is an 78 y/o F with PMH of HTN, DM type 2, Cardiac Arrest, CVA, COPD, Chronic Respiratory Failure Vent Dependant s/p trach & PEG, Multiple Hospital Admissions for Aspiration & vent associated PNA, COVID-19 Infection, Anemia with GI blood loss, GERD, and Advanced Dementia who was sent from [facility] facility for acute respiratory distress   manasa and transaminitis   leukocytosis tachypnea sepsis secondary to   pneumonia     plan  f/u blood cx  sputum cx  chest ct reviewed vascular congestion/  b/l pleural effusions unchanged and unchanged consolidation  based on sputum cx -- pseudomonas cre  and zosyn adeel 16 high  and serratia - is sensitive to meropenem   called [facility] to release zerbaxa and  avycaz is sensitive to both  called pharmacy and they have ceftazidime/ avibactam 1.25 q 12 based on creat cl of 24  linezolid ok to continue  supportive vent care  suction as needed     trend wbc and temp    This is an 78 y/o F with PMH of HTN, DM type 2, Cardiac Arrest, CVA, COPD, Chronic Respiratory Failure Vent Dependant s/p trach & PEG, Multiple Hospital Admissions for Aspiration & vent associated PNA, COVID-19 Infection, Anemia with GI blood loss, GERD, and Advanced Dementia who was sent from [facility] facility for acute respiratory distress   manasa and transaminitis   leukocytosis tachypnea sepsis secondary to   pneumonia     plan  f/u blood cx  sputum cx  chest ct reviewed vascular congestion/  b/l pleural effusions unchanged and unchanged consolidation  based on sputum cx -- pseudomonas cre  and zosyn adeel 16 high  and serratia - is sensitive to meropenem   called [facility] lab to release zerbaxa and  avycaz is sensitive to both  called pharmacy and they have ceftazidime/ avibactam 1.25 q 12 based on creat cl of 24  linezolid ok to continue  supportive vent care  suction as needed     trend wbc and temp   check mrsa screen

## 2022-05-22 NOTE — PROGRESS NOTE ADULT - SUBJECTIVE AND OBJECTIVE BOX
Subjective: Patient seen and examined. No overnight events.     MEDICATIONS  (STANDING):  ceftazidime/avibactam IVPB 1.25 Gram(s) IV Intermittent every 12 hours  chlorhexidine 0.12% Liquid 15 milliLiter(s) Oral Mucosa every 12 hours  chlorhexidine 2% Cloths 1 Application(s) Topical <User Schedule>  chlorhexidine 4% Liquid 1 Application(s) Topical <User Schedule>  dextrose 5%. 1000 milliLiter(s) (100 mL/Hr) IV Continuous <Continuous>  dextrose 5%. 1000 milliLiter(s) (50 mL/Hr) IV Continuous <Continuous>  dextrose 50% Injectable 25 Gram(s) IV Push once  dextrose 50% Injectable 12.5 Gram(s) IV Push once  dextrose 50% Injectable 25 Gram(s) IV Push once  fentaNYL   Infusion..... 0.5 MICROgram(s)/kG/Hr (0.54 mL/Hr) IV Continuous <Continuous>  glucagon  Injectable 1 milliGRAM(s) IntraMuscular once  heparin   Injectable 5000 Unit(s) SubCutaneous every 8 hours  insulin glargine Injectable (LANTUS) 5 Unit(s) SubCutaneous every morning  insulin lispro (ADMELOG) corrective regimen sliding scale   SubCutaneous every 6 hours  lactobacillus acidophilus 1 Tablet(s) Oral daily  linezolid  IVPB 600 milliGRAM(s) IV Intermittent every 12 hours  mineral oil/petrolatum Hydrophilic Ointment 1 Application(s) Topical daily  norepinephrine Infusion 0.05 MICROgram(s)/kG/Min (5.02 mL/Hr) IV Continuous <Continuous>  pantoprazole  Injectable 40 milliGRAM(s) IV Push daily  propofol Infusion 10 MICROgram(s)/kG/Min (3.21 mL/Hr) IV Continuous <Continuous>    MEDICATIONS  (PRN):  albuterol/ipratropium for Nebulization 3 milliLiter(s) Nebulizer every 6 hours PRN Shortness of Breath and/or Wheezing  dextrose Oral Gel 15 Gram(s) Oral once PRN Blood Glucose LESS THAN 70 milliGRAM(s)/deciliter  sodium chloride 0.9% lock flush 10 milliLiter(s) IV Push every 1 hour PRN Pre/post blood products, medications, blood draw, and to maintain line patency      Allergies    codeine (Hives)    Intolerances        Vital Signs Last 24 Hrs  T(C): 37 (22 May 2022 12:30), Max: 37.9 (21 May 2022 19:59)  T(F): 98.6 (22 May 2022 12:30), Max: 100.3 (21 May 2022 19:59)  HR: 69 (22 May 2022 14:00) (52 - 89)  BP: 105/60 (22 May 2022 14:00) (70/46 - 169/74)  BP(mean): 75 (22 May 2022 14:00) (51 - 777)  RR: 23 (22 May 2022 14:00) (20 - 38)  SpO2: 100% (22 May 2022 14:00) (82% - 100%)    PHYSICAL EXAM:  GENERAL: Laying in bed with eyes open and unresponsive   HEAD:  Atraumatic, Normocephalic  ENMT: Moist mucous membranes,   NECK: Supple, No JVD, Normal thyroid; Trach to Vent   CHEST/LUNG: Clear to auscultation bilaterally; No rales, rhonchi, wheezing, or rubs  HEART: Regular rate and rhythm; No murmurs, rubs, or gallops  ABDOMEN: Distended and PEG Tube +   EXTREMITIES:  2+ Peripheral Pulses, No clubbing, cyanosis, or edema      LABS:                        7.2    13.09 )-----------( 332      ( 22 May 2022 06:23 )             25.7     22 May 2022 06:23    140    |  102    |  61     ----------------------------<  294    4.6     |  31     |  1.59     Ca    8.2        22 May 2022 06:23  Phos  2.9       22 May 2022 06:23  Mg     3.3       22 May 2022 06:23    TPro  6.4    /  Alb  2.0    /  TBili  0.6    /  DBili  x      /  AST  184    /  ALT  150    /  AlkPhos  201    22 May 2022 06:23    PT/INR - ( 22 May 2022 01:22 )   PT: 14.5 sec;   INR: 1.23 ratio         PTT - ( 22 May 2022 01:22 )  PTT:34.5 sec  Urinalysis Basic - ( 21 May 2022 20:51 )    Color: Yellow / Appearance: Clear / S.010 / pH: x  Gluc: x / Ketone: Negative  / Bili: Negative / Urobili: Negative mg/dL   Blood: x / Protein: 100 mg/dL / Nitrite: Negative   Leuk Esterase: Moderate / RBC: 0-2 /HPF / WBC 3-5   Sq Epi: x / Non Sq Epi: Occasional / Bacteria: x      CAPILLARY BLOOD GLUCOSE      POCT Blood Glucose.: 160 mg/dL (22 May 2022 11:35)  POCT Blood Glucose.: 289 mg/dL (22 May 2022 06:06)      RADIOLOGY & ADDITIONAL TESTS:    Imaging Personally Reviewed:  [ ] YES     Consultant(s) Notes Reviewed:      Care Discussed with Consultants/Other Providers:    Advanced Directives: [ ] DNR  [ ] No feeding tube  [ ] MOLST in chart  [ ] MOLST completed today  [ ] Unknown

## 2022-05-22 NOTE — PROGRESS NOTE ADULT - ASSESSMENT
79F HTN, DM2, COPD, Chronic Respiratory Failure on Trach to Vent admitted for Acute on Chronic Respiratory Failure.     Acute on Chronic Respiratory Failure / Septic Shock   Aspiration vs. VAP; History of CRE and Psuedomonas;  CT imaging shows B/L Pleural Effusion   Septic Shock and on Pressors; IV Ceftazidime + Linezolid + Levophed   Follow up all cultures and repeat Lactate  ID and Pulmonary consults appreciated     Acute Renal Failure   Most likely due to Sepsis   Will continue gentle hydration and maintain BP   Renally dose and monitor BMP and electrolytes     HTN  Hold all BP lower agents    DM2  FS and on ISS to maintain BS <180    Diet  Tube Feeds    DVT Prophylaxis  Heparin    Disposition  Full Code   Discharge planning pending hospital course

## 2022-05-22 NOTE — DIETITIAN INITIAL EVALUATION ADULT - NUTRITIONGOAL OUTCOME1
Pt to tolerate initiation and progression of enteral nutrition via peg; improved skin integrity, deter weight loss.

## 2022-05-22 NOTE — PATIENT PROFILE ADULT - NSPROPTRIGHTNOTIFYOBTAINDET_GEN_A_NUR
MD/PA VISIT. FEMALE WITH ABDOMINAL PAIN. TENDER RUQ. CHECK LABS, SONO. AGREE WITH HX PE TX DISPO nonverbal

## 2022-05-22 NOTE — PROCEDURE NOTE - ADDITIONAL PROCEDURE DETAILS
Patient is a 77 yo female admitted with...    - Acute hypoxic resp failure  - Chronic resp failure  - ARDS   - Shock ; likely septic though unknown etiology  - Pneumonia   - RYAN  - Leukocytosis with bandemia   - b/l pleural effusions    Requiring CVC for vasopressor and medication administration.   CVC placed under ultrasound, vessel visualized, access obtained, wire passed easily and visualized in R IJ, catheter passed and wire removed. All ports aspirated and flushed.
Patient is a 75 yo female admitted with...    - Acute hypoxic resp failure  - Chronic resp failure  - ARDS   - Shock ; likely septic though unknown etiology  - Pneumonia   - YRAN  - Leukocytosis with bandemia   - b/l pleural effusions    Art line placed for continuous BP monitoring in presence of vasopressor titration. Wave form appropriate. Performed under ultrasound.

## 2022-05-22 NOTE — CONSULT NOTE ADULT - SUBJECTIVE AND OBJECTIVE BOX
[facility]  MOIZ Lora, SOLANGE Higgins G. Casimir   [phone]  after hours and weekends [phone]    BREA BECKHAM  76y, Female  [id number]      HPI:  This is an 80 y/o F with PMH of HTN, DM type 2, Cardiac Arrest, CVA, COPD, Chronic Respiratory Failure Vent Dependant s/p trach & PEG, Multiple Hospital Admissions for Aspiration & vent associated PNA, COVID-19 Infection, Anemia with GI blood loss, GERD, and Advanced Dementia who was sent from [facility] facility for acute respiratory distress with unknown onset, no reported fever or chills. On arrival to ED she was found with tachypnea & dropping of her SPO2 on same vent settings, CT chest without contrast revealed worse B/L PNA & B/L pleural effusion compared with her CT one month ago.     PMH/PSH--  Dementia of frontal lobe type  Aphasic stroke  Diabetes mellitus  Respiratory failure  Hypertension  GERD (gastroesophageal reflux disease)  Constipation  Respiratory failure  CVA (cerebral vascular accident)  HTN (hypertension)  peg        Allergies--codeine      Medications--  Antibiotics: linezolid  IVPB 600 milliGRAM(s) IV Intermittent every 12 hours  meropenem  IVPB 500 milliGRAM(s) IV Intermittent every 12 hours    Immunologic:   Other: albuterol/ipratropium for Nebulization PRN  chlorhexidine 0.12% Liquid  chlorhexidine 2% Cloths  chlorhexidine 4% Liquid  dextrose 5%.  dextrose 5%.  dextrose 50% Injectable  dextrose 50% Injectable  dextrose 50% Injectable  dextrose Oral Gel PRN  fentaNYL   Infusion.....  glucagon  Injectable  heparin   Injectable  insulin glargine Injectable (LANTUS)  insulin lispro (ADMELOG) corrective regimen sliding scale  lactobacillus acidophilus  mineral oil/petrolatum Hydrophilic Ointment  norepinephrine Infusion  pantoprazole  Injectable  propofol Infusion  sodium chloride 0.9% lock flush PRN      Social History--  nc  Family/Marital History--             Travel/Environmental/Occupational History:  nc    Review of Systems:  REVIEW OF SYSTEMS  unable to obtain     Physical Exam--  Vital Signs: T(F): 98.6 (05-22-22 @ 12:30), Max: 100.3 (05-21-22 @ 19:59)  HR: 68 (05-22-22 @ 13:00)  BP: 103/61 (05-22-22 @ 13:00)  RR: 26 (05-22-22 @ 13:00)  SpO2: 97% (05-22-22 @ 13:00)  Wt(kg): --  General: vent   HEENT: AT/NC.   Neck: tracg  Lungs: Clear bilaterally without rales, wheezing or rhonchi  Heart: Regular rate and rhythm. N  Abdomen: Bowel sounds present and normoactive. Soft. +peg   Back: No spinal tenderness. No costovertebral angle tenderness.   Extremities: No cyanosis or clubbing. trace  edema.   Skin: Warm. Dry. Good turgor. No rash. No vasculitic stigmata.        Laboratory & Imaging Data--  CBC                        7.2    13.09 )-----------( 332      ( 22 May 2022 06:23 )             25.7       Chemistries  05-22    140  |  102  |  61<H>  ----------------------------<  294<H>  4.6   |  31  |  1.59<H>    Ca    8.2<L>      22 May 2022 06:23  Phos  2.9     05-22  Mg     3.3     05-22    TPro  6.4  /  Alb  2.0<L>  /  TBili  0.6  /  DBili  x   /  AST  184<H>  /  ALT  150<H>  /  AlkPhos  201<H>  05-22      Culture Data    < from: Xray Chest 1 View- PORTABLE-Urgent (Xray Chest 1 View- PORTABLE-Urgent .) (05.22.22 @ 00:50) >    ACC: [id number] EXAM:  XR CHEST PORTABLE URGENT 1V                          PROCEDURE DATE:  05/22/2022          INTERPRETATION:  Exam:XR CHEST URGENT    clinical history:Line Placement    Right-sided central venous catheter is seen with tip overlying the   superior vena cava. Remainder of chest not significantly changed from   yesterday. No evidence of pneumothorax.    IMPRESSION: Line placement as above    < end of copied text >  < from: CT Chest No Cont (05.21.22 @ 23:49) >  change in the right lower lobe passive atelectasis.    The right lower lobe calcifications are unchanged.    Mediastinum: The visualized thyroid gland is unremarkable. The esophagus   is unremarkable. There are no enlarged chest lymph nodes.    The heart is normal in size. Coronary artery calcifications. Small amount   of pericardial fluid. Mitral annular calcification. The aorta is normal   in caliber. Atheromatous disease of the aorta.    Upper Abdomen: The thickening of the left adrenal gland is unchanged.   Percutaneous gastrostomy tube in place.    Bones And Soft Tissues: Degenerative change of the spine. Since   4/21/2022, the anterior wedging of the L2 vertebral body and sclerosis of   the L2 vertebral body is unchanged allowing for differences in technique.    The soft tissues are unremarkable.      IMPRESSION:    1.  New bilateral groundglass opacities, patchy consolidation, and septal   thickening are of uncertain etiology.  2.  No change in the bilateral pleural effusions (left greater than   right), the near complete consolidation of the left lower lobe and right   lower lobe    --- End of Report ---    < end of copied text >  < from: Xray Chest 1 View- PORTABLE-Urgent (05.21.22 @ 20:24) >    clinical history:Sepsis    Worsening bilateral opacification with now almost complete opacification   of the left hemithorax. No pneumothorax seen.    IMPRESSION: Worsening bilateral opacification likely related to extensive   airspace disease    < end of copied text >

## 2022-05-22 NOTE — DIETITIAN INITIAL EVALUATION ADULT - PERTINENT MEDS FT
MEDICATIONS  (STANDING):  chlorhexidine 0.12% Liquid 15 milliLiter(s) Oral Mucosa every 12 hours  chlorhexidine 2% Cloths 1 Application(s) Topical <User Schedule>  chlorhexidine 4% Liquid 1 Application(s) Topical <User Schedule>  dextrose 5%. 1000 milliLiter(s) (50 mL/Hr) IV Continuous <Continuous>  dextrose 5%. 1000 milliLiter(s) (100 mL/Hr) IV Continuous <Continuous>  dextrose 50% Injectable 25 Gram(s) IV Push once  dextrose 50% Injectable 12.5 Gram(s) IV Push once  dextrose 50% Injectable 25 Gram(s) IV Push once  fentaNYL   Infusion..... 0.5 MICROgram(s)/kG/Hr (0.54 mL/Hr) IV Continuous <Continuous>  glucagon  Injectable 1 milliGRAM(s) IntraMuscular once  heparin   Injectable 5000 Unit(s) SubCutaneous every 8 hours  insulin glargine Injectable (LANTUS) 5 Unit(s) SubCutaneous every morning  insulin lispro (ADMELOG) corrective regimen sliding scale   SubCutaneous every 6 hours  lactobacillus acidophilus 1 Tablet(s) Oral daily  linezolid  IVPB 600 milliGRAM(s) IV Intermittent every 12 hours  meropenem  IVPB 500 milliGRAM(s) IV Intermittent every 12 hours  mineral oil/petrolatum Hydrophilic Ointment 1 Application(s) Topical daily  norepinephrine Infusion 0.05 MICROgram(s)/kG/Min (5.02 mL/Hr) IV Continuous <Continuous>  pantoprazole  Injectable 40 milliGRAM(s) IV Push daily  propofol Infusion 10 MICROgram(s)/kG/Min (3.21 mL/Hr) IV Continuous <Continuous>    MEDICATIONS  (PRN):  albuterol/ipratropium for Nebulization 3 milliLiter(s) Nebulizer every 6 hours PRN Shortness of Breath and/or Wheezing  dextrose Oral Gel 15 Gram(s) Oral once PRN Blood Glucose LESS THAN 70 milliGRAM(s)/deciliter  sodium chloride 0.9% lock flush 10 milliLiter(s) IV Push every 1 hour PRN Pre/post blood products, medications, blood draw, and to maintain line patency

## 2022-05-23 LAB
ALBUMIN SERPL ELPH-MCNC: 1.9 G/DL — LOW (ref 3.3–5)
ALP SERPL-CCNC: 136 U/L — HIGH (ref 30–120)
ALT FLD-CCNC: 149 U/L DA — HIGH (ref 10–60)
ANION GAP SERPL CALC-SCNC: 9 MMOL/L — SIGNIFICANT CHANGE UP (ref 5–17)
AST SERPL-CCNC: 104 U/L — HIGH (ref 10–40)
BASE EXCESS BLDA CALC-SCNC: 7.3 MMOL/L — HIGH (ref -2–3)
BASOPHILS # BLD AUTO: 0.03 K/UL — SIGNIFICANT CHANGE UP (ref 0–0.2)
BASOPHILS NFR BLD AUTO: 0.2 % — SIGNIFICANT CHANGE UP (ref 0–2)
BILIRUB SERPL-MCNC: 0.4 MG/DL — SIGNIFICANT CHANGE UP (ref 0.2–1.2)
BLOOD GAS COMMENTS ARTERIAL: SIGNIFICANT CHANGE UP
BUN SERPL-MCNC: 47 MG/DL — HIGH (ref 7–23)
CALCIUM SERPL-MCNC: 8 MG/DL — LOW (ref 8.4–10.5)
CHLORIDE SERPL-SCNC: 105 MMOL/L — SIGNIFICANT CHANGE UP (ref 96–108)
CO2 SERPL-SCNC: 29 MMOL/L — SIGNIFICANT CHANGE UP (ref 22–31)
CREAT SERPL-MCNC: 1.48 MG/DL — HIGH (ref 0.5–1.3)
CULTURE RESULTS: SIGNIFICANT CHANGE UP
EGFR: 36 ML/MIN/1.73M2 — LOW
EOSINOPHIL # BLD AUTO: 0.12 K/UL — SIGNIFICANT CHANGE UP (ref 0–0.5)
EOSINOPHIL NFR BLD AUTO: 0.9 % — SIGNIFICANT CHANGE UP (ref 0–6)
FERRITIN SERPL-MCNC: 565 NG/ML — HIGH (ref 15–150)
GAS PNL BLDA: SIGNIFICANT CHANGE UP
GLUCOSE SERPL-MCNC: 205 MG/DL — HIGH (ref 70–99)
GRAM STN FLD: SIGNIFICANT CHANGE UP
HAV IGM SER-ACNC: SIGNIFICANT CHANGE UP
HBV CORE IGM SER-ACNC: SIGNIFICANT CHANGE UP
HBV SURFACE AG SER-ACNC: SIGNIFICANT CHANGE UP
HCO3 BLDA-SCNC: 30 MMOL/L — HIGH (ref 21–28)
HCT VFR BLD CALC: 23.1 % — LOW (ref 34.5–45)
HCV AB S/CO SERPL IA: 0.16 S/CO — SIGNIFICANT CHANGE UP (ref 0–0.99)
HCV AB SERPL-IMP: SIGNIFICANT CHANGE UP
HGB BLD-MCNC: 6.8 G/DL — CRITICAL LOW (ref 11.5–15.5)
HOROWITZ INDEX BLDA+IHG-RTO: 70 — SIGNIFICANT CHANGE UP
IMM GRANULOCYTES NFR BLD AUTO: 0.8 % — SIGNIFICANT CHANGE UP (ref 0–1.5)
INR BLD: 1.29 RATIO — HIGH (ref 0.88–1.16)
LACTATE SERPL-SCNC: 1.3 MMOL/L — SIGNIFICANT CHANGE UP (ref 0.7–2)
LYMPHOCYTES # BLD AUTO: 1.2 K/UL — SIGNIFICANT CHANGE UP (ref 1–3.3)
LYMPHOCYTES # BLD AUTO: 9.2 % — LOW (ref 13–44)
MCHC RBC-ENTMCNC: 24.6 PG — LOW (ref 27–34)
MCHC RBC-ENTMCNC: 29.4 GM/DL — LOW (ref 32–36)
MCV RBC AUTO: 83.7 FL — SIGNIFICANT CHANGE UP (ref 80–100)
MONOCYTES # BLD AUTO: 0.52 K/UL — SIGNIFICANT CHANGE UP (ref 0–0.9)
MONOCYTES NFR BLD AUTO: 4 % — SIGNIFICANT CHANGE UP (ref 2–14)
NEUTROPHILS # BLD AUTO: 11.08 K/UL — HIGH (ref 1.8–7.4)
NEUTROPHILS NFR BLD AUTO: 84.9 % — HIGH (ref 43–77)
NRBC # BLD: 0 /100 WBCS — SIGNIFICANT CHANGE UP (ref 0–0)
OB PNL STL: NEGATIVE — SIGNIFICANT CHANGE UP
PCO2 BLDA: 34 MMHG — SIGNIFICANT CHANGE UP (ref 32–35)
PH BLDA: 7.55 — HIGH (ref 7.35–7.45)
PHOSPHATE SERPL-MCNC: 2.1 MG/DL — LOW (ref 2.5–4.5)
PLATELET # BLD AUTO: 317 K/UL — SIGNIFICANT CHANGE UP (ref 150–400)
PO2 BLDA: 81 MMHG — LOW (ref 83–108)
POTASSIUM SERPL-MCNC: 3.2 MMOL/L — LOW (ref 3.5–5.3)
POTASSIUM SERPL-SCNC: 3.2 MMOL/L — LOW (ref 3.5–5.3)
PROCALCITONIN SERPL-MCNC: 1.4 NG/ML — HIGH (ref 0.02–0.1)
PROT SERPL-MCNC: 5.4 G/DL — LOW (ref 6–8.3)
PROTHROM AB SERPL-ACNC: 15.3 SEC — HIGH (ref 10.5–13.4)
RBC # BLD: 2.76 M/UL — LOW (ref 3.8–5.2)
RBC # FLD: 22.3 % — HIGH (ref 10.3–14.5)
SAO2 % BLDA: 97.8 % — SIGNIFICANT CHANGE UP (ref 94–98)
SODIUM SERPL-SCNC: 143 MMOL/L — SIGNIFICANT CHANGE UP (ref 135–145)
SPECIMEN SOURCE: SIGNIFICANT CHANGE UP
SPECIMEN SOURCE: SIGNIFICANT CHANGE UP
TROPONIN I, HIGH SENSITIVITY RESULT: 257.6 NG/L — HIGH
WBC # BLD: 13.05 K/UL — HIGH (ref 3.8–10.5)
WBC # FLD AUTO: 13.05 K/UL — HIGH (ref 3.8–10.5)

## 2022-05-23 PROCEDURE — 76705 ECHO EXAM OF ABDOMEN: CPT | Mod: 26

## 2022-05-23 PROCEDURE — 99233 SBSQ HOSP IP/OBS HIGH 50: CPT

## 2022-05-23 PROCEDURE — 93970 EXTREMITY STUDY: CPT | Mod: 26

## 2022-05-23 RX ORDER — ACETAMINOPHEN 500 MG
650 TABLET ORAL EVERY 6 HOURS
Refills: 0 | Status: DISCONTINUED | OUTPATIENT
Start: 2022-05-23 | End: 2022-06-01

## 2022-05-23 RX ORDER — POTASSIUM CHLORIDE 20 MEQ
40 PACKET (EA) ORAL ONCE
Refills: 0 | Status: COMPLETED | OUTPATIENT
Start: 2022-05-23 | End: 2022-05-23

## 2022-05-23 RX ORDER — ACETAMINOPHEN 500 MG
650 TABLET ORAL EVERY 6 HOURS
Refills: 0 | Status: DISCONTINUED | OUTPATIENT
Start: 2022-05-23 | End: 2022-05-23

## 2022-05-23 RX ORDER — INSULIN GLARGINE 100 [IU]/ML
5 INJECTION, SOLUTION SUBCUTANEOUS EVERY MORNING
Refills: 0 | Status: DISCONTINUED | OUTPATIENT
Start: 2022-05-23 | End: 2022-06-01

## 2022-05-23 RX ADMIN — NYSTATIN CREAM 1 APPLICATION(S): 100000 CREAM TOPICAL at 13:12

## 2022-05-23 RX ADMIN — HEPARIN SODIUM 5000 UNIT(S): 5000 INJECTION INTRAVENOUS; SUBCUTANEOUS at 21:39

## 2022-05-23 RX ADMIN — NYSTATIN CREAM 1 APPLICATION(S): 100000 CREAM TOPICAL at 21:39

## 2022-05-23 RX ADMIN — CHLORHEXIDINE GLUCONATE 15 MILLILITER(S): 213 SOLUTION TOPICAL at 05:39

## 2022-05-23 RX ADMIN — CHLORHEXIDINE GLUCONATE 1 APPLICATION(S): 213 SOLUTION TOPICAL at 05:39

## 2022-05-23 RX ADMIN — Medication 40 MILLIEQUIVALENT(S): at 11:13

## 2022-05-23 RX ADMIN — NYSTATIN CREAM 1 APPLICATION(S): 100000 CREAM TOPICAL at 05:40

## 2022-05-23 RX ADMIN — Medication 2: at 05:39

## 2022-05-23 RX ADMIN — Medication 1 MILLIGRAM(S): at 01:49

## 2022-05-23 RX ADMIN — Medication 1 MILLIGRAM(S): at 20:36

## 2022-05-23 RX ADMIN — Medication 1 MILLIGRAM(S): at 22:42

## 2022-05-23 RX ADMIN — Medication 1 APPLICATION(S): at 13:10

## 2022-05-23 RX ADMIN — Medication 1 MILLIGRAM(S): at 05:40

## 2022-05-23 RX ADMIN — Medication 2: at 18:18

## 2022-05-23 RX ADMIN — Medication 6: at 13:14

## 2022-05-23 RX ADMIN — PANTOPRAZOLE SODIUM 40 MILLIGRAM(S): 20 TABLET, DELAYED RELEASE ORAL at 13:11

## 2022-05-23 RX ADMIN — HEPARIN SODIUM 5000 UNIT(S): 5000 INJECTION INTRAVENOUS; SUBCUTANEOUS at 05:40

## 2022-05-23 RX ADMIN — Medication 1 MILLIGRAM(S): at 18:13

## 2022-05-23 RX ADMIN — Medication 1 MILLIGRAM(S): at 14:54

## 2022-05-23 RX ADMIN — Medication 1 MILLIGRAM(S): at 14:57

## 2022-05-23 RX ADMIN — LINEZOLID 300 MILLIGRAM(S): 600 INJECTION, SOLUTION INTRAVENOUS at 05:38

## 2022-05-23 RX ADMIN — LINEZOLID 300 MILLIGRAM(S): 600 INJECTION, SOLUTION INTRAVENOUS at 18:13

## 2022-05-23 RX ADMIN — Medication 1 TABLET(S): at 13:11

## 2022-05-23 RX ADMIN — Medication 650 MILLIGRAM(S): at 11:12

## 2022-05-23 RX ADMIN — CHLORHEXIDINE GLUCONATE 15 MILLILITER(S): 213 SOLUTION TOPICAL at 18:13

## 2022-05-23 RX ADMIN — Medication 1 MILLIGRAM(S): at 10:59

## 2022-05-23 RX ADMIN — INSULIN GLARGINE 5 UNIT(S): 100 INJECTION, SOLUTION SUBCUTANEOUS at 08:44

## 2022-05-23 RX ADMIN — HEPARIN SODIUM 5000 UNIT(S): 5000 INJECTION INTRAVENOUS; SUBCUTANEOUS at 14:55

## 2022-05-23 NOTE — PROGRESS NOTE ADULT - ASSESSMENT
This is an 80 y/o F with PMH of HTN, DM type 2, Cardiac Arrest, CVA, COPD, Chronic Respiratory Failure Vent Dependant s/p trach & PEG, Multiple Hospital Admissions for Aspiration & vent associated PNA, COVID-19 Infection, Anemia with GI blood loss, GERD, and Advanced Dementia who was sent from [facility] facility for acute respiratory distress   manasa and transaminitis   leukocytosis tachypnea sepsis secondary to   pneumonia     plan  f/u blood cx  sputum cx  chest ct reviewed vascular congestion/  b/l pleural effusions unchanged and unchanged consolidation  based on sputum cx -- pseudomonas cre  and zosyn adeel 16 high  and serratia - is sensitive to meropenem   called micro lab to release zerbaxa and  avycaz is sensitive to both  called pharmacy and they have ceftazidime/ avibactam 1.25 q 12 based on creat cl of 24  linezolid ok to continue  supportive vent care  suction as needed     trend wbc and temp   check mrsa screen    Infectious Diseases will continue to follow. Please call with any questions.   nAdressa Brantley M.D.  [facility], Division of Infectious Diseases [phone]   This is an 80 y/o F with PMH of HTN, DM type 2, Cardiac Arrest, CVA, COPD, Chronic Respiratory Failure Vent Dependant s/p trach & PEG, Multiple Hospital Admissions for Aspiration & vent associated PNA, COVID-19 Infection, Anemia with GI blood loss, GERD, and Advanced Dementia who was sent from [facility] facility for acute respiratory distress   Sepsis 2/2 PNA  AHRF on vent    Plan  f/u pending cx  chest ct reviewed vascular congestion/b/l pleural effusions unchanged and unchanged consolidation  based on prior sputum cx -- pseudomonas cre and zosyn adeel 16 high; serratia - is sensitive to meropenem   C/w avycaz  C/w linezolid  supportive vent care  suction as needed   trend wbc and temp   check mrsa screen    Infectious Diseases will continue to follow. Please call with any questions.   Andressa Brantley M.D.  [facility], Division of Infectious Diseases [phone]

## 2022-05-23 NOTE — CONSULT NOTE ADULT - SUBJECTIVE AND OBJECTIVE BOX
Date/Time Patient Seen:  		  Referring MD:   Data Reviewed	       Patient is a 76y old  Female who presents with a chief complaint of Acute respiratory distress. (22 May 2022 20:03)      Subjective/HPI     PAST MEDICAL & SURGICAL HISTORY:  Dementia of frontal lobe type    Aphasic stroke    Diabetes mellitus    Respiratory failure    Hypertension    GERD (gastroesophageal reflux disease)    Constipation    Respiratory failure    CVA (cerebral vascular accident)    HTN (hypertension)    DM (diabetes mellitus)    Advanced dementia    COVID-19 virus detected    Quadriplegia    Pneumonia    Type II diabetes mellitus    Hx of appendectomy    Gastrostomy in place    Tracheostomy in place    Tracheostomy tube present    Feeding by G-tube          Medication list         MEDICATIONS  (STANDING):  ceftazidime/avibactam IVPB 1.25 Gram(s) IV Intermittent every 12 hours  chlorhexidine 0.12% Liquid 15 milliLiter(s) Oral Mucosa every 12 hours  chlorhexidine 2% Cloths 1 Application(s) Topical <User Schedule>  chlorhexidine 4% Liquid 1 Application(s) Topical <User Schedule>  dextrose 5%. 1000 milliLiter(s) (50 mL/Hr) IV Continuous <Continuous>  dextrose 5%. 1000 milliLiter(s) (100 mL/Hr) IV Continuous <Continuous>  dextrose 50% Injectable 25 Gram(s) IV Push once  dextrose 50% Injectable 12.5 Gram(s) IV Push once  dextrose 50% Injectable 25 Gram(s) IV Push once  glucagon  Injectable 1 milliGRAM(s) IntraMuscular once  heparin   Injectable 5000 Unit(s) SubCutaneous every 8 hours  insulin glargine Injectable (LANTUS) 5 Unit(s) SubCutaneous every morning  insulin lispro (ADMELOG) corrective regimen sliding scale   SubCutaneous every 6 hours  lactobacillus acidophilus 1 Tablet(s) Oral daily  linezolid  IVPB 600 milliGRAM(s) IV Intermittent every 12 hours  LORazepam     Tablet 1 milliGRAM(s) Oral every 4 hours  mineral oil/petrolatum Hydrophilic Ointment 1 Application(s) Topical daily  nystatin Powder 1 Application(s) Topical three times a day  pantoprazole  Injectable 40 milliGRAM(s) IV Push daily    MEDICATIONS  (PRN):  albuterol/ipratropium for Nebulization 3 milliLiter(s) Nebulizer every 6 hours PRN Shortness of Breath and/or Wheezing  dextrose Oral Gel 15 Gram(s) Oral once PRN Blood Glucose LESS THAN 70 milliGRAM(s)/deciliter  LORazepam   Injectable 1 milliGRAM(s) IV Push every 6 hours PRN agitation/vent dyssynchrony  sodium chloride 0.9% lock flush 10 milliLiter(s) IV Push every 1 hour PRN Pre/post blood products, medications, blood draw, and to maintain line patency         Vitals log        ICU Vital Signs Last 24 Hrs  T(C): 37.6 (23 May 2022 03:22), Max: 38.2 (23 May 2022 00:04)  T(F): 99.6 (23 May 2022 03:22), Max: 100.7 (23 May 2022 00:04)  HR: 73 (23 May 2022 06:00) (52 - 75)  BP: 113/48 (23 May 2022 06:00) (86/59 - 127/61)  BP(mean): 68 (23 May 2022 06:00) (66 - 82)  ABP: 133/56 (23 May 2022 06:00) (102/50 - 168/77)  ABP(mean): 82 (23 May 2022 06:00) (68 - 109)  RR: 26 (23 May 2022 06:00) (23 - 26)  SpO2: 100% (23 May 2022 06:00) (93% - 100%)       Mode: AC/ CMV (Assist Control/ Continuous Mandatory Ventilation)  RR (machine): 26  TV (machine): 400  FiO2: 70  PEEP: 10  ITime: 1  MAP: 22  PIP: 36      Input and Output:  I&O's Detail    22 May 2022 07:01  -  23 May 2022 07:00  --------------------------------------------------------  IN:    FentaNYL: 4 mL    IV PiggyBack: 850 mL    Jevity 1.5: 260 mL    Norepinephrine: 19 mL    Propofol: 32 mL  Total IN: 1165 mL    OUT:    Indwelling Catheter - Urethral (mL): 1700 mL  Total OUT: 1700 mL    Total NET: -535 mL          Lab Data                        6.8    13.05 )-----------( 317      ( 23 May 2022 06:44 )             23.1     05-23    143  |  105  |  47<H>  ----------------------------<  205<H>  3.2<L>   |  29  |  1.48<H>    Ca    8.0<L>      23 May 2022 06:44  Phos  2.1     05-23  Mg     3.3     05-22    TPro  5.4<L>  /  Alb  1.9<L>  /  TBili  0.4  /  DBili  x   /  AST  104<H>  /  ALT  149<H>  /  AlkPhos  136<H>  05-23    ABG - ( 23 May 2022 05:38 )  pH, Arterial: 7.55  pH, Blood: x     /  pCO2: 34    /  pO2: 81    / HCO3: 30    / Base Excess: 7.3   /  SaO2: 97.8                    Review of Systems	      Objective     Physical Examination        Pertinent Lab findings & Imaging      Annalise:  NO   Adequate UO     I&O's Detail    22 May 2022 07:01  -  23 May 2022 07:00  --------------------------------------------------------  IN:    FentaNYL: 4 mL    IV PiggyBack: 850 mL    Jevity 1.5: 260 mL    Norepinephrine: 19 mL    Propofol: 32 mL  Total IN: 1165 mL    OUT:    Indwelling Catheter - Urethral (mL): 1700 mL  Total OUT: 1700 mL    Total NET: -535 mL               Discussed with:     Cultures:	        Radiology                             Date/Time Patient Seen:  		  Referring MD:   Data Reviewed	       Patient is a 76y old  Female who presents with a chief complaint of Acute respiratory distress. (22 May 2022 20:03)      Subjective/HPI  vs noted  labs reviewed  H and P reviewed  ER provider note reviewed  Imaging reviewed - old records reviewed -     This is an 78 y/o F with PMH of HTN, DM type 2, Cardiac Arrest, CVA, COPD, Chronic Respiratory Failure Vent Dependant s/p trach & PEG, Multiple Hospital Admissions for Aspiration & vent associated PNA, COVID-19 Infection, Anemia with GI blood loss, GERD, and Advanced Dementia who was sent from [facility] facility for acute respiratory distress with unknown onset, no reported fever or chills. On arrival to ED she was found with tachypnea & dropping of her SPO2 on same vent settings, CT chest without contrast revealed worse B/L PNA & B/L pleural effusion compared with her CT one month ago. Patient was discharged from hospital 3 weeks ago after admission for a similar condition, No history could obtained given her status.     PAST MEDICAL & SURGICAL HISTORY:  Dementia of frontal lobe type    Aphasic stroke    Diabetes mellitus    Respiratory failure    Hypertension    GERD (gastroesophageal reflux disease)    Constipation    Respiratory failure    CVA (cerebral vascular accident)    HTN (hypertension)    DM (diabetes mellitus)    Advanced dementia    COVID-19 virus detected    Quadriplegia    Pneumonia    Type II diabetes mellitus    Hx of appendectomy    Gastrostomy in place    Tracheostomy in place    Tracheostomy tube present    Feeding by G-tube      FAMILY HISTORY:  No pertinent family history in first degree relatives. No pertinent family history of: Unable to obtain because of medical condition.     Social History:  Social History (marital status, living situation, occupation, tobacco use, alcohol and drug use, and sexual history): -    Unable to obtain as stated above.     Tobacco Screening:  · Core Measure Site	No  · Has the patient used tobacco in the past 30 days?	No    Risk Assessment:    Present on Admission:  Deep Venous Thrombosis	no  Pulmonary Embolus	no  Urinary Catheter	yes  Central Venous Catheter/PICC Line	no  Surgical Site Incision	no  Pressure Ulcer(s)	yes     Heart Failure:  Does this patient have a history of or has been diagnosed with heart failure? no.      Medication list         MEDICATIONS  (STANDING):  ceftazidime/avibactam IVPB 1.25 Gram(s) IV Intermittent every 12 hours  chlorhexidine 0.12% Liquid 15 milliLiter(s) Oral Mucosa every 12 hours  chlorhexidine 2% Cloths 1 Application(s) Topical <User Schedule>  chlorhexidine 4% Liquid 1 Application(s) Topical <User Schedule>  dextrose 5%. 1000 milliLiter(s) (50 mL/Hr) IV Continuous <Continuous>  dextrose 5%. 1000 milliLiter(s) (100 mL/Hr) IV Continuous <Continuous>  dextrose 50% Injectable 25 Gram(s) IV Push once  dextrose 50% Injectable 12.5 Gram(s) IV Push once  dextrose 50% Injectable 25 Gram(s) IV Push once  glucagon  Injectable 1 milliGRAM(s) IntraMuscular once  heparin   Injectable 5000 Unit(s) SubCutaneous every 8 hours  insulin glargine Injectable (LANTUS) 5 Unit(s) SubCutaneous every morning  insulin lispro (ADMELOG) corrective regimen sliding scale   SubCutaneous every 6 hours  lactobacillus acidophilus 1 Tablet(s) Oral daily  linezolid  IVPB 600 milliGRAM(s) IV Intermittent every 12 hours  LORazepam     Tablet 1 milliGRAM(s) Oral every 4 hours  mineral oil/petrolatum Hydrophilic Ointment 1 Application(s) Topical daily  nystatin Powder 1 Application(s) Topical three times a day  pantoprazole  Injectable 40 milliGRAM(s) IV Push daily    MEDICATIONS  (PRN):  albuterol/ipratropium for Nebulization 3 milliLiter(s) Nebulizer every 6 hours PRN Shortness of Breath and/or Wheezing  dextrose Oral Gel 15 Gram(s) Oral once PRN Blood Glucose LESS THAN 70 milliGRAM(s)/deciliter  LORazepam   Injectable 1 milliGRAM(s) IV Push every 6 hours PRN agitation/vent dyssynchrony  sodium chloride 0.9% lock flush 10 milliLiter(s) IV Push every 1 hour PRN Pre/post blood products, medications, blood draw, and to maintain line patency         Vitals log        ICU Vital Signs Last 24 Hrs  T(C): 37.6 (23 May 2022 03:22), Max: 38.2 (23 May 2022 00:04)  T(F): 99.6 (23 May 2022 03:22), Max: 100.7 (23 May 2022 00:04)  HR: 73 (23 May 2022 06:00) (52 - 75)  BP: 113/48 (23 May 2022 06:00) (86/59 - 127/61)  BP(mean): 68 (23 May 2022 06:00) (66 - 82)  ABP: 133/56 (23 May 2022 06:00) (102/50 - 168/77)  ABP(mean): 82 (23 May 2022 06:00) (68 - 109)  RR: 26 (23 May 2022 06:00) (23 - 26)  SpO2: 100% (23 May 2022 06:00) (93% - 100%)       Mode: AC/ CMV (Assist Control/ Continuous Mandatory Ventilation)  RR (machine): 26  TV (machine): 400  FiO2: 70  PEEP: 10  ITime: 1  MAP: 22  PIP: 36      Input and Output:  I&O's Detail    22 May 2022 07:01  -  23 May 2022 07:00  --------------------------------------------------------  IN:    FentaNYL: 4 mL    IV PiggyBack: 850 mL    Jevity 1.5: 260 mL    Norepinephrine: 19 mL    Propofol: 32 mL  Total IN: 1165 mL    OUT:    Indwelling Catheter - Urethral (mL): 1700 mL  Total OUT: 1700 mL    Total NET: -535 mL          Lab Data                        6.8    13.05 )-----------( 317      ( 23 May 2022 06:44 )             23.1     05-23    143  |  105  |  47<H>  ----------------------------<  205<H>  3.2<L>   |  29  |  1.48<H>    Ca    8.0<L>      23 May 2022 06:44  Phos  2.1     05-23  Mg     3.3     05-22    TPro  5.4<L>  /  Alb  1.9<L>  /  TBili  0.4  /  DBili  x   /  AST  104<H>  /  ALT  149<H>  /  AlkPhos  136<H>  05-23    ABG - ( 23 May 2022 05:38 )  pH, Arterial: 7.55  pH, Blood: x     /  pCO2: 34    /  pO2: 81    / HCO3: 30    / Base Excess: 7.3   /  SaO2: 97.8                    Review of Systems	  vent support      Objective     Physical Examination    heart s1s2  lung dec BS  abd soft      Pertinent Lab findings & Imaging      Woody:  NO   Adequate UO     I&O's Detail    22 May 2022 07:01  -  23 May 2022 07:00  --------------------------------------------------------  IN:    FentaNYL: 4 mL    IV PiggyBack: 850 mL    Jevity 1.5: 260 mL    Norepinephrine: 19 mL    Propofol: 32 mL  Total IN: 1165 mL    OUT:    Indwelling Catheter - Urethral (mL): 1700 mL  Total OUT: 1700 mL    Total NET: -535 mL               Discussed with:     Cultures:	        Radiology      ACC: [id number] EXAM:  CT CHEST                          PROCEDURE DATE:  05/21/2022          INTERPRETATION:  CT CHEST WITHOUT CONTRAST    INDICATION: Shortness of breath. Hypoxia. History of hypertension,   diabetes and cerebrovascular accident. Suspected acute on chronic   respiratory failure with hypoxia.    TECHNIQUE: Unenhanced helical images were obtained of the chest. Coronal   and sagittal images were reconstructed. Maximum intensity projection   images were generated.    COMPARISON: Radiograph 5/21/2022. CT chest 4/21/2019, 1/6/2022,   12/20/2021.    FINDINGS:    Tubes/Lines: Tracheostomy.    Lungs, airways and pleura: There are new bilateral groundglass opacities   (most pronounced in the upper lobes), septal thickening, and subtle areas   of airway dilatation in the upper lobes.    No change in the bilateral (large) pleural effusions (left greater than   right). No change in their near complete consolidation of the left lower   lobe. There are patchy areas of consolidation in the upper lobes. No   change in the right lower lobe passive atelectasis.    The right lower lobe calcifications are unchanged.    Mediastinum: The visualized thyroid gland is unremarkable. The esophagus   is unremarkable. There are no enlarged chest lymph nodes.    The heart is normal in size. Coronary artery calcifications. Small amount   of pericardial fluid. Mitral annular calcification. The aorta is normal   in caliber. Atheromatous disease of the aorta.    Upper Abdomen: The thickening of the left adrenal gland is unchanged.   Percutaneous gastrostomy tube in place.    Bones And Soft Tissues: Degenerative change of the spine. Since   4/21/2022, the anterior wedging of the L2 vertebral body and sclerosis of   the L2 vertebral body is unchanged allowing for differences in technique.    The soft tissues are unremarkable.      IMPRESSION:    1.  New bilateral groundglass opacities, patchy consolidation, and septal   thickening are of uncertain etiology.  2.  No change in the bilateral pleural effusions (left greater than   right), the near complete consolidation of the left lower lobe and right   lower lobe    --- End of Report ---            OLIVIA HOBSON MD; Attending Radiologist  This document has been electronically signed. May 22 2022 10:58AM

## 2022-05-23 NOTE — PROGRESS NOTE ADULT - SUBJECTIVE AND OBJECTIVE BOX
[facility], Division of Infectious Diseases  MOIZ Lora Y. Patel, S. Shah, G. [facility]  [phone]    Name: BREA BECKHAM  Age: 76y  Gender: Female  MRN: [id number]    Interval History:  Patient seen and examined at bedside this morning in the SPCU  No acute overnight events. Febrile to 100.6F this AM  Nonverbal trach/PGT  Notes reviewed    Antibiotics:  ceftazidime/avibactam IVPB 1.25 Gram(s) IV Intermittent every 12 hours  linezolid  IVPB 600 milliGRAM(s) IV Intermittent every 12 hours      Medications:  acetaminophen    Suspension .. 650 milliGRAM(s) Oral every 6 hours PRN  albuterol/ipratropium for Nebulization 3 milliLiter(s) Nebulizer every 6 hours PRN  ceftazidime/avibactam IVPB 1.25 Gram(s) IV Intermittent every 12 hours  chlorhexidine 0.12% Liquid 15 milliLiter(s) Oral Mucosa every 12 hours  chlorhexidine 2% Cloths 1 Application(s) Topical <User Schedule>  chlorhexidine 4% Liquid 1 Application(s) Topical <User Schedule>  dextrose 5%. 1000 milliLiter(s) IV Continuous <Continuous>  dextrose 5%. 1000 milliLiter(s) IV Continuous <Continuous>  dextrose 50% Injectable 25 Gram(s) IV Push once  dextrose 50% Injectable 12.5 Gram(s) IV Push once  dextrose 50% Injectable 25 Gram(s) IV Push once  dextrose Oral Gel 15 Gram(s) Oral once PRN  glucagon  Injectable 1 milliGRAM(s) IntraMuscular once  heparin   Injectable 5000 Unit(s) SubCutaneous every 8 hours  insulin glargine Injectable (LANTUS) 5 Unit(s) SubCutaneous every morning  insulin lispro (ADMELOG) corrective regimen sliding scale   SubCutaneous every 6 hours  lactobacillus acidophilus 1 Tablet(s) Oral daily  linezolid  IVPB 600 milliGRAM(s) IV Intermittent every 12 hours  LORazepam     Tablet 1 milliGRAM(s) Oral every 4 hours  LORazepam   Injectable 1 milliGRAM(s) IV Push every 6 hours PRN  mineral oil/petrolatum Hydrophilic Ointment 1 Application(s) Topical daily  nystatin Powder 1 Application(s) Topical three times a day  pantoprazole  Injectable 40 milliGRAM(s) IV Push daily  potassium chloride   Powder 40 milliEquivalent(s) Oral once  sodium chloride 0.9% lock flush 10 milliLiter(s) IV Push every 1 hour PRN      Review of Systems:  unable to obtain    Allergies: codeine (Hives)    For details regarding the patient's past medical history, social history, family history, and other miscellaneous elements, please refer the initial infectious diseases consultation and/or the admitting history and physical examination for this admission.    Objective:  Vitals:   T(C): 38.1 (22 @ 08:55), Max: 38.2 (22 @ 00:04)  HR: 78 (22 @ 10:01) (60 - 78)  BP: 104/54 (22 @ 08:00) (103/61 - 113/65)  RR: 26 (22 @ 08:00) (23 - 26)  SpO2: 99% (22 @ 10:01) (93% - 100%)    Physical Examination:  General: chronically ill appearing W  HEENT: NC/AT  Neck: trach to vent  Cardio: S1, S2 heard, RRR, no murmurs  Resp: MV breath sounds  Abd: soft, NT, ND, PGT in place  Neuro: nonverbal  Ext: no edema or cyanosis  Skin: warm, dry      Laboratory Studies:  CBC:                       6.8    13.05 )-----------( 317      ( 23 May 2022 06:44 )             23.1     CMP:     143  |  105  |  47<H>  ----------------------------<  205<H>  3.2<L>   |  29  |  1.48<H>    Ca    8.0<L>      23 May 2022 06:44  Phos  2.1       Mg     3.3         TPro  5.4<L>  /  Alb  1.9<L>  /  TBili  0.4  /  DBili  x   /  AST  104<H>  /  ALT  149<H>  /  AlkPhos  136<H>      LIVER FUNCTIONS - ( 23 May 2022 06:44 )  Alb: 1.9 g/dL / Pro: 5.4 g/dL / ALK PHOS: 136 U/L / ALT: 149 U/L DA / AST: 104 U/L / GGT: x           Urinalysis Basic - ( 21 May 2022 20:51 )    Color: Yellow / Appearance: Clear / S.010 / pH: x  Gluc: x / Ketone: Negative  / Bili: Negative / Urobili: Negative mg/dL   Blood: x / Protein: 100 mg/dL / Nitrite: Negative   Leuk Esterase: Moderate / RBC: 0-2 /HPF / WBC 3-5   Sq Epi: x / Non Sq Epi: Occasional / Bacteria: x        Microbiology: reviewed    Culture - Urine (collected 22 @ 20:51)  Source: Clean Catch Clean Catch (Midstream)  Final Report (22 @ 10:12):    <10,000 CFU/mL Normal Urogenital Elvira        Radiology: reviewed

## 2022-05-23 NOTE — PROGRESS NOTE ADULT - SUBJECTIVE AND OBJECTIVE BOX
BREA BECKHAM     [facility] 04    Allergies    codeine (Hives)    Intolerances        PAST MEDICAL & SURGICAL HISTORY:  Dementia of frontal lobe type      Aphasic stroke      Diabetes mellitus      Respiratory failure      Hypertension      GERD (gastroesophageal reflux disease)      Constipation      Respiratory failure      CVA (cerebral vascular accident)      HTN (hypertension)      DM (diabetes mellitus)      Advanced dementia      COVID-19 virus detected      Quadriplegia      Pneumonia      Type II diabetes mellitus      Hx of appendectomy      Gastrostomy in place      Tracheostomy in place      Tracheostomy tube present      Feeding by G-tube          FAMILY HISTORY:  No pertinent family history in first degree relatives        Home Medications:  albuterol 90 mcg/inh inhalation aerosol with adapter: 2  inhaled every 6 hours (21 May 2022 21:16)  Bacid (LAC) oral tablet: 2 tab(s) by gastrostomy tube once a day (21 May 2022 21:16)  Basaglar KwikPen 100 units/mL subcutaneous solution: 36 unit(s) subcutaneous once a day (at bedtime) (21 May 2022 21:16)  Betadine 10% topical swab: cleanse right and left great toes (21 May 2022 21:16)  Carafate 1 g/10 mL oral suspension: 10 milliliter(s) by gastrostomy tube 4 times a day (before meals and at bedtime) for 14 days (Started 21) (21 May 2022 21:16)  chlorhexidine 0.12% mucous membrane liquid: 15 milliliter(s) mucous membrane 2 times a day (21 May 2022 21:16)  Eucerin topical cream: Apply topically to affected area once a day bilateral feet (21 May 2022 21:16)  folic acid 1 mg oral tablet: 1 tab(s) orally once a day (21 May 2022 21:16)  ipratropium-albuterol 0.5 mg-2.5 mg/3 mL inhalation solution: 3 milliliter(s) inhaled 4 times a day (21 May 2022 21:16)  LORazepam 1 mg oral tablet: 1 tab(s) by gastrostomy tube every 4 hours (21 May 2022 21:16)  methocarbamol 500 mg oral tablet: 1 tab(s) by gastrostomy tube 2 times a day (21 May 2022 21:16)  MiraLax oral powder for reconstitution:  (21 May 2022 23:14)  Multiple Vitamins oral tablet: 1 tab(s) orally once a day (21 May 2022 21:16)  omeprazole 20 mg oral delayed release capsule: orally 2 times a day (21 May 2022 23:14)  polyethylene glycol 3350 oral powder for reconstitution: 17 gram(s) by gastrostomy tube every 12 hours (21 May 2022 21:16)  senna 8.6 mg oral tablet: 3 tab(s) by gastrostomy tube once a day (at bedtime) (21 May 2022 21:16)  simethicone 80 mg oral tablet, chewable: 1 tab(s) by gastrostomy tube every 6 hours (21 May 2022 21:16)  Tylenol 325 mg oral tablet: 2 tab(s) by gastrostomy tube once a day; 60 minutes prior to dressing change  (21 May 2022 21:16)  Tylenol 325 mg oral tablet: 2 tab(s) by gastrostomy tube every 6 hours, As Needed (21 May 2022 21:16)  Zosyn:  (21 May 2022 23:14)      MEDICATIONS  (STANDING):  ceftazidime/avibactam IVPB 1.25 Gram(s) IV Intermittent every 12 hours  chlorhexidine 0.12% Liquid 15 milliLiter(s) Oral Mucosa every 12 hours  chlorhexidine 2% Cloths 1 Application(s) Topical <User Schedule>  chlorhexidine 4% Liquid 1 Application(s) Topical <User Schedule>  dextrose 5%. 1000 milliLiter(s) (50 mL/Hr) IV Continuous <Continuous>  dextrose 5%. 1000 milliLiter(s) (100 mL/Hr) IV Continuous <Continuous>  dextrose 50% Injectable 25 Gram(s) IV Push once  dextrose 50% Injectable 12.5 Gram(s) IV Push once  dextrose 50% Injectable 25 Gram(s) IV Push once  glucagon  Injectable 1 milliGRAM(s) IntraMuscular once  heparin   Injectable 5000 Unit(s) SubCutaneous every 8 hours  insulin glargine Injectable (LANTUS) 5 Unit(s) SubCutaneous every morning  insulin lispro (ADMELOG) corrective regimen sliding scale   SubCutaneous every 6 hours  lactobacillus acidophilus 1 Tablet(s) Oral daily  linezolid  IVPB 600 milliGRAM(s) IV Intermittent every 12 hours  LORazepam     Tablet 1 milliGRAM(s) Oral every 4 hours  mineral oil/petrolatum Hydrophilic Ointment 1 Application(s) Topical daily  nystatin Powder 1 Application(s) Topical three times a day  pantoprazole  Injectable 40 milliGRAM(s) IV Push daily  potassium chloride   Powder 40 milliEquivalent(s) Oral once    MEDICATIONS  (PRN):  acetaminophen    Suspension .. 650 milliGRAM(s) Oral every 6 hours PRN Temp greater or equal to 38C (100.4F), Mild Pain (1 - 3)  albuterol/ipratropium for Nebulization 3 milliLiter(s) Nebulizer every 6 hours PRN Shortness of Breath and/or Wheezing  dextrose Oral Gel 15 Gram(s) Oral once PRN Blood Glucose LESS THAN 70 milliGRAM(s)/deciliter  LORazepam   Injectable 1 milliGRAM(s) IV Push every 6 hours PRN agitation/vent dyssynchrony  sodium chloride 0.9% lock flush 10 milliLiter(s) IV Push every 1 hour PRN Pre/post blood products, medications, blood draw, and to maintain line patency      Diet, NPO with Tube Feed:   Tube Feeding Modality: Gastrostomy  Jevity 1.5 Horacio  Total Volume for 24 Hours (mL): 480  Continuous  Starting Tube Feed Rate mL per Hour: 20  Until Goal Tube Feed Rate (mL per Hour): 20  Tube Feed Duration (in Hours): 24  Tube Feed Start Time: 18:30 (22 @ 18:06) [Active]  Diet, NPO with Tube Feed:   Tube Feeding Modality: Gastrostomy  Glucerna 1.5 Horacio  Total Volume for 24 Hours (mL): 1200  Continuous  Starting Tube Feed Rate mL per Hour: 20  Increase Tube Feed Rate by (mL): 10     Every 6 hours  Until Goal Tube Feed Rate (mL per Hour): 60  Tube Feed Duration (in Hours): 20  Tube Feed Start Time: 00:00 (22 @ 11:42) [Pending Verification By Attending]          Vital Signs Last 24 Hrs  T(C): 38.1 (23 May 2022 08:55), Max: 38.2 (23 May 2022 00:04)  T(F): 100.6 (23 May 2022 08:55), Max: 100.7 (23 May 2022 00:04)  HR: 78 (23 May 2022 10:01) (63 - 78)  BP: 104/54 (23 May 2022 08:00) (103/61 - 113/48)  BP(mean): 70 (23 May 2022 08:00) (66 - 77)  RR: 26 (23 May 2022 08:00) (23 - 26)  SpO2: 99% (23 May 2022 10:01) (93% - 100%)      22 @ 07:01  -  22 @ 07:00  --------------------------------------------------------  IN: 1165 mL / OUT: 1700 mL / NET: -535 mL        Mode: AC/ CMV (Assist Control/ Continuous Mandatory Ventilation), RR (machine): 26, TV (machine): 400, FiO2: 70, PEEP: 10, ITime: 1, MAP: 22, PIP: 35      LABS:                        6.8    13.05 )-----------( 317      ( 23 May 2022 06:44 )             23.1         143  |  105  |  47<H>  ----------------------------<  205<H>  3.2<L>   |  29  |  1.48<H>    Ca    8.0<L>      23 May 2022 06:44  Phos  2.1       Mg     3.3         TPro  5.4<L>  /  Alb  1.9<L>  /  TBili  0.4  /  DBili  x   /  AST  104<H>  /  ALT  149<H>  /  AlkPhos  136<H>      PT/INR - ( 23 May 2022 06:44 )   PT: 15.3 sec;   INR: 1.29 ratio         PTT - ( 22 May 2022 01:22 )  PTT:34.5 sec  Urinalysis Basic - ( 21 May 2022 20:51 )    Color: Yellow / Appearance: Clear / S.010 / pH: x  Gluc: x / Ketone: Negative  / Bili: Negative / Urobili: Negative mg/dL   Blood: x / Protein: 100 mg/dL / Nitrite: Negative   Leuk Esterase: Moderate / RBC: 0-2 /HPF / WBC 3-5   Sq Epi: x / Non Sq Epi: Occasional / Bacteria: x        ABG - ( 23 May 2022 05:38 )  pH, Arterial: 7.55  pH, Blood: x     /  pCO2: 34    /  pO2: 81    / HCO3: 30    / Base Excess: 7.3   /  SaO2: 97.8                WBC:  WBC Count: 13.05 K/uL ( @ 06:44)  WBC Count: 13.09 K/uL ( @ :23)  WBC Count: 21.57 K/uL ( @ :22)  WBC Count: 20.49 K/uL ( @ 22:30)  WBC Count: 22.94 K/uL ( @ 20:28)      MICROBIOLOGY:  RECENT CULTURES:   Clean Catch Clean Catch (Midstream) XXXX XXXX   <10,000 CFU/mL Normal Urogenital Elvira                PT/INR - ( 23 May 2022 06:44 )   PT: 15.3 sec;   INR: 1.29 ratio         PTT - ( 22 May 2022 01:22 )  PTT:34.5 sec    Sodium:  Sodium, Serum: 143 mmol/L ( @ 06:44)  Sodium, Serum: 140 mmol/L ( @ :23)  Sodium, Serum: 140 mmol/L ( @ :22)  Sodium, Serum: 140 mmol/L ( 22:30)  Sodium, Serum: 141 mmol/L ( @ 20:28)      1.48 mg/dL  @ 06:44  1.59 mg/dL  @ 06:23  1.72 mg/dL :22  1.65 mg/dL  22:30  1.62 mg/dL  20:28      Hemoglobin:  Hemoglobin: 6.8 g/dL ( @ 06:44)  Hemoglobin: 7.2 g/dL ( @ 06:23)  Hemoglobin: 7.3 g/dL ( @ 01:22)  Hemoglobin: 8.0 g/dL ( @ 22:30)  Hemoglobin: 8.1 g/dL ( 20:28)      Platelets: Platelet Count - Automated: 317 K/uL ( @ 06:44)  Platelet Count - Automated: 332 K/uL ( @ :23)  Platelet Count - Automated: 423 K/uL (05-22 @ 01:22)  Platelet Count - Automated: 331 K/uL ( @ 22:30)  Platelet Count - Automated: 383 K/uL ( @ 20:28)      LIVER FUNCTIONS - ( 23 May 2022 06:44 )  Alb: 1.9 g/dL / Pro: 5.4 g/dL / ALK PHOS: 136 U/L / ALT: 149 U/L DA / AST: 104 U/L / GGT: x             Urinalysis Basic - ( 21 May 2022 20:51 )    Color: Yellow / Appearance: Clear / S.010 / pH: x  Gluc: x / Ketone: Negative  / Bili: Negative / Urobili: Negative mg/dL   Blood: x / Protein: 100 mg/dL / Nitrite: Negative   Leuk Esterase: Moderate / RBC: 0-2 /HPF / WBC 3-5   Sq Epi: x / Non Sq Epi: Occasional / Bacteria: x        RADIOLOGY & ADDITIONAL STUDIES:      MICROBIOLOGY:  RECENT CULTURES:   Clean Catch Clean Catch (Midstream) XXXX XXXX   <10,000 CFU/mL Normal Urogenital Elvira

## 2022-05-23 NOTE — PROGRESS NOTE ADULT - ASSESSMENT
REVIEW OF SYMPTOMS      Able to give (reliable) ROS  NO     PHYSICAL EXAM    HEENT Unremarkable  atraumatic   RESP Fair air entry EXP prolonged    Harsh breath sound Resp distres mild   CARDIAC S1 S2 No S3     NO JVD    ABDOMEN SOFT BS PRESENT NOT DISTENDED No hepatosplenomegaly   PEDAL EDEMA present No calf tenderness  NO rash     AGE/SEX.   76 f    DOA.  5/21/2022     CC .  5/21/2022 AOC RESP FAILURE     PMH .  pmh Hosp stay given zosyn 4/21  pmh Pneumonia    pmh HTN,   pmh DM,   pmh CVA,   pmh  chronic respiratory failure   pmh s/p trach, PEG,   pmh cardiac arrest      MAIN ISSUES  .  SEVERE HYPOXIC RESP FAILURE poa   VAP poa   SHOCK poa   BL PL EFFSNS   CHF  PULM HYTN   ANEMIA 5/23/2022 Hb 6.8   ELEVATED LFTS       COVID/ICU/CODE STATUS.                       COVID  STATUS. 5/21/2022 scv2 (-)           ICU STAY. none  GOC.      BEST PRACTICE ISSUES.                                                  HEAD OF BED ELEVATION. Yes  DVT PROPHYLAXIS.     5/21/2022 hpsc    SQUIRES PROPHYLAXIS.5/22/2022 protonix 40   INFECTION PROPHYLAXIS.   5/21/2022 Ch;lorhexidine .12%   5/21/2022 Chlorhexidine 2%                                                                                         DIET.   5/22/2022 jevity 1.5 20 gt     VITALS/PO/IO/VENT/DRIPS.     5/23/2022 100f 78 110/40   5/23/2022 ac 26/400/10/70%      PROBLEM DATA/ASSESSMENT RECOMMENDATIONS (A/R).    HEMODYNAMICS.   Monitor and optimize bp   Target MAP to miguel  65 (+)    RESP.   Monitor and optimize  po   Target po to remain 90-95%    ABG.  5/23/2022 5a ac 26/400/10/70%  755/34/81   5/22/2022 5a vent 90% 744/41/117   5/21/2022 8p 100% vent 739/50/48    PMH/PSH PROBLEMS.  Management continued/modified as indicated      VENT MANAGEMENT.   HOB elevation  Target Pplat 30 (-)  Target PO 90-95%  Target pH 730 (+)  Daily spontaneous breathing trials   Daily sedation vacation         DIARRHEA.  5/23/2022 c diff ordered    PICC poa   5/23/2022 Ordered to dc picc    INFECTION SOURCE DD.   INFECTION A/R.   Has ho crp   Has ho iv abio within last 90 d  Is on bsab   id eval appreciated   Started on ceftazidime avibactam 1.25x2 5/22/2022      INFECTION AUGUST.  W 5/21-5/22-5/23/2022 w 20 - 13 - 13  ua 5/21/2022 w 3-5   ct ch 5/21/2022 new bl ggo patchy cpnsolidatnand septal thickening of uncertain etiology   No change bl effsns l greater   Near complete consolidatn both lower loobes   MICROBIO.  Rvp 5/21/2022 (-)   urine c 5/21 (-)   PAST MICROBIO.  4/24/2022 sputum CRABAPENEM RESISTANT PSEUDOMONAS  ABIO.  5/22/2022 ceftazidime avibactam 1.25x2   5/21/2022 linezolid    SEVERE HYPOXIC RESP FAILURE poa.   History Patient is in VDRF for severeal months and is in semi-vegetative state in proloned coma with trach peg post cacseveral mo ago   A/R   5/22/2022 Oxygenation improved Now on 70% O2     RO VTE.  V duplx 5/23/2022 (-)     COPD.  5/22/2022 duoneb     PLEURAL EFFSNS.  echo 9/8/2021   n lvsf   mild dd   n rvsf   rvsp 51   ct 4/22/2022 ct ch 4/21/2022   bl effsns increased in size cw 1/2022 5/23/2022 dw  in detail spent over 15 min explained rbaa thoracentesis including the increased risk on vent patients   A/R   5/23/2022 Thorac ordered (discussed in rounds consensus was to get thorac)     MI.  5/23/2022 Tr 258    CHF.  echo 9/8/2021   n lvsf   mild dd   n rvsf   rvsp 51   bnp 5/21/2022 bnp 20702 5/22/2022 cardio consulted      ANEMIA.   Hb 5/21-5/22-5/23/2022 Hb 8 - 7.2 - 6.8   monitor  target hb 7 (+)     TRANSFUSION.  5/23/2022 1 u prbc    RYAN.  Na 5/21/2022 Na 140  CO2 5/21/2022 CO2 30   Cr 5/21-5/22-5/23/2022 Cr 1.6 - 1.5- 1.4    monitor     ELEVATED LFTS.  LFTS 5/22-5/23/2022  - 136   - 104   - 149   5/22/2022 us abd fibrofatty liver dis borderline gbw thick tr perichole fluid   5/22/2022 check hep panel      TIME SPENT   Over 36  minutes aggregate critical care time spent on encounter; activities included   direct patient care, counseling and/or coordinating care reviewing notes, lab data/ imaging , discussion with multidisciplinary team/ patient  /family and explaining in detail risks, benefits, alternatives  of the recommendations     CHAPINCITO GUIDRY 76 f [facility] S 5/21/2022

## 2022-05-23 NOTE — CONSULT NOTE ADULT - ASSESSMENT
78 y/o F with PMH of HTN, DM type 2, Cardiac Arrest, CVA, COPD, Chronic Respiratory Failure Vent Dependant s/p trach & PEG, Multiple Hospital Admissions for Aspiration & vent associated PNA, COVID-19 Infection, Anemia with GI blood loss, GERD, and Advanced Dementia presented with acute respiratory distress.  HCP Marciano -  - pt is full code  on emp ABX - broad spectrum for poss PNA  [facility] rx regimen  bronchodilators  oral and skin care  assist with needs - ADL  monitor VS and HD  CT imaging reviewed  Old records reviewed  suction PRN  trach care  peg care  nutritional optimization  decubitus prevention

## 2022-05-23 NOTE — CONSULT NOTE ADULT - ASSESSMENT
The patient is a 76 year old female with a history of HTN, DM, CVA, dementia, chronic respiratory failure s/p trach, PEG, cardiac arrest, anemia who presents with respiratory distress.    Plan:  - ECG with no evidence of ischemia or infarction  - Troponin elevated at 257 in the setting of respiratory failure and septic shock. No need to trend further.  - Echo 9/21 with normal LV systolic function, mod pulm HTN  - On ceftazidime  - Pulm and ID follow-up

## 2022-05-23 NOTE — PROGRESS NOTE ADULT - ASSESSMENT
79F HTN, DM2, COPD, Chronic Respiratory Failure on Trach to Vent admitted for Acute on Chronic Respiratory Failure.     Acute on Chronic Respiratory Failure / Septic Shock   Aspiration vs. VAP vs ; History of CRE and Psuedomonas; Continues to have low grade temps  CT imaging shows B/L Pleural Effusion and could be parapneumonic effusion; Will need to explore with IR guided thoracentesis when more stable    Septic Shock and on Pressors; IV Ceftazidime + Linezolid + Levophed   Follow up all cultures  ID and Pulmonary consults appreciated     Anemia of Chronic Disease   Has been evaluated by GI and Hematology on prior admissions  She has Anemia of Chronic Disease but could also have intermittent GI Bleeding; Check occult blood   Transfuse 1U PRBC today and will also check Iron stores     Acute Renal Failure   Most likely due to Sepsis   Will continue gentle hydration and maintain BP   Renally dose and monitor BMP and electrolytes     HTN  Hold all BP lower agents    DM2  FS and on ISS to maintain BS <180    Diet  Tube Feeds    DVT Prophylaxis  Heparin    Disposition  Full Code   Discharge planning pending hospital course

## 2022-05-23 NOTE — CONSULT NOTE ADULT - SUBJECTIVE AND OBJECTIVE BOX
History of Present Illness: The patient is a 76 year old female with a history of HTN, DM, CVA, dementia, chronic respiratory failure s/p trach, PEG, cardiac arrest, anemia who presents with respiratory distress. The patient is unable to provide additional history. She was found to have a PNA, started on antibiotics, and was briefly on IV vasopressors.    Past Medical/Surgical History:  HTN, DM, CVA, dementia, chronic respiratory failure s/p trach, PEG, cardiac arrest, anemia    Medications:  Home Medications:  albuterol 90 mcg/inh inhalation aerosol with adapter: 2  inhaled every 6 hours (21 May 2022 21:16)  Bacid (LAC) oral tablet: 2 tab(s) by gastrostomy tube once a day (21 May 2022 21:16)  Basaglar KwikPen 100 units/mL subcutaneous solution: 36 unit(s) subcutaneous once a day (at bedtime) (21 May 2022 21:16)  Betadine 10% topical swab: cleanse right and left great toes (21 May 2022 21:16)  Carafate 1 g/10 mL oral suspension: 10 milliliter(s) by gastrostomy tube 4 times a day (before meals and at bedtime) for 14 days (Started 6/4/21) (21 May 2022 21:16)  chlorhexidine 0.12% mucous membrane liquid: 15 milliliter(s) mucous membrane 2 times a day (21 May 2022 21:16)  Eucerin topical cream: Apply topically to affected area once a day bilateral feet (21 May 2022 21:16)  folic acid 1 mg oral tablet: 1 tab(s) orally once a day (21 May 2022 21:16)  ipratropium-albuterol 0.5 mg-2.5 mg/3 mL inhalation solution: 3 milliliter(s) inhaled 4 times a day (21 May 2022 21:16)  LORazepam 1 mg oral tablet: 1 tab(s) by gastrostomy tube every 4 hours (21 May 2022 21:16)  methocarbamol 500 mg oral tablet: 1 tab(s) by gastrostomy tube 2 times a day (21 May 2022 21:16)  MiraLax oral powder for reconstitution:  (21 May 2022 23:14)  Multiple Vitamins oral tablet: 1 tab(s) orally once a day (21 May 2022 21:16)  omeprazole 20 mg oral delayed release capsule: orally 2 times a day (21 May 2022 23:14)  polyethylene glycol 3350 oral powder for reconstitution: 17 gram(s) by gastrostomy tube every 12 hours (21 May 2022 21:16)  senna 8.6 mg oral tablet: 3 tab(s) by gastrostomy tube once a day (at bedtime) (21 May 2022 21:16)  simethicone 80 mg oral tablet, chewable: 1 tab(s) by gastrostomy tube every 6 hours (21 May 2022 21:16)  Tylenol 325 mg oral tablet: 2 tab(s) by gastrostomy tube once a day; 60 minutes prior to dressing change  (21 May 2022 21:16)  Tylenol 325 mg oral tablet: 2 tab(s) by gastrostomy tube every 6 hours, As Needed (21 May 2022 21:16)  Zosyn:  (21 May 2022 23:14)      Family History: Non-contributory family history of premature cardiovascular atherosclerotic disease    Social History: No tobacco, alcohol or drug use    Review of Systems:  General: No fevers, chills, weight gain  Skin: No rashes, color changes  Cardiovascular: No chest pain, orthopnea  Respiratory: No shortness of breath, cough  Gastrointestinal: No nausea, abdominal pain  Genitourinary: No incontinence, pain with urination  Musculoskeletal: No pain, swelling, decreased range of motion  Neurological: No headache, weakness  Psychiatric: No depression, anxiety  Endocrine: No weight gain, increased thirst  All other systems are comprehensively negative.    Physical Exam:  Vitals:        Vital Signs Last 24 Hrs  T(C): 37.6 (23 May 2022 03:22), Max: 38.2 (23 May 2022 00:04)  T(F): 99.6 (23 May 2022 03:22), Max: 100.7 (23 May 2022 00:04)  HR: 73 (23 May 2022 06:00) (58 - 75)  BP: 113/48 (23 May 2022 06:00) (86/59 - 113/65)  BP(mean): 68 (23 May 2022 06:00) (66 - 80)  RR: 26 (23 May 2022 06:00) (23 - 26)  SpO2: 100% (23 May 2022 06:00) (93% - 100%)  General: NAD  HEENT: MMM  Neck: No JVD, no carotid bruit  Lungs: CTAB  CV: RRR, nl S1/S2, no M/R/G  Abdomen: S/NT/ND, +BS  Extremities: No LE edema, no cyanosis  Neuro: AAOx3, non-focal  Skin: No rash    Labs:                        6.8    13.05 )-----------( 317      ( 23 May 2022 06:44 )             23.1     05-23    143  |  105  |  47<H>  ----------------------------<  205<H>  3.2<L>   |  29  |  1.48<H>    Ca    8.0<L>      23 May 2022 06:44  Phos  2.1     05-23  Mg     3.3     05-22    TPro  5.4<L>  /  Alb  1.9<L>  /  TBili  0.4  /  DBili  x   /  AST  104<H>  /  ALT  149<H>  /  AlkPhos  136<H>  05-23        PT/INR - ( 23 May 2022 06:44 )   PT: 15.3 sec;   INR: 1.29 ratio         PTT - ( 22 May 2022 01:22 )  PTT:34.5 sec    ECG: NSR, normal axis, low voltage    Telemetry: Sinus rhythm

## 2022-05-23 NOTE — PROGRESS NOTE ADULT - SUBJECTIVE AND OBJECTIVE BOX
Subjective: Overnight has been having low grade fevers (100.6).     MEDICATIONS  (STANDING):  ceftazidime/avibactam IVPB 1.25 Gram(s) IV Intermittent every 12 hours  chlorhexidine 0.12% Liquid 15 milliLiter(s) Oral Mucosa every 12 hours  chlorhexidine 2% Cloths 1 Application(s) Topical <User Schedule>  chlorhexidine 4% Liquid 1 Application(s) Topical <User Schedule>  dextrose 5%. 1000 milliLiter(s) (50 mL/Hr) IV Continuous <Continuous>  dextrose 5%. 1000 milliLiter(s) (100 mL/Hr) IV Continuous <Continuous>  dextrose 50% Injectable 25 Gram(s) IV Push once  dextrose 50% Injectable 12.5 Gram(s) IV Push once  dextrose 50% Injectable 25 Gram(s) IV Push once  glucagon  Injectable 1 milliGRAM(s) IntraMuscular once  heparin   Injectable 5000 Unit(s) SubCutaneous every 8 hours  insulin glargine Injectable (LANTUS) 5 Unit(s) SubCutaneous every morning  insulin lispro (ADMELOG) corrective regimen sliding scale   SubCutaneous every 6 hours  lactobacillus acidophilus 1 Tablet(s) Oral daily  linezolid  IVPB 600 milliGRAM(s) IV Intermittent every 12 hours  LORazepam     Tablet 1 milliGRAM(s) Oral every 4 hours  mineral oil/petrolatum Hydrophilic Ointment 1 Application(s) Topical daily  nystatin Powder 1 Application(s) Topical three times a day  pantoprazole  Injectable 40 milliGRAM(s) IV Push daily    MEDICATIONS  (PRN):  acetaminophen  Suppository .. 650 milliGRAM(s) Rectal every 6 hours PRN Temp greater or equal to 38C (100.4F)  albuterol/ipratropium for Nebulization 3 milliLiter(s) Nebulizer every 6 hours PRN Shortness of Breath and/or Wheezing  dextrose Oral Gel 15 Gram(s) Oral once PRN Blood Glucose LESS THAN 70 milliGRAM(s)/deciliter  LORazepam   Injectable 1 milliGRAM(s) IV Push every 6 hours PRN agitation/vent dyssynchrony  sodium chloride 0.9% lock flush 10 milliLiter(s) IV Push every 1 hour PRN Pre/post blood products, medications, blood draw, and to maintain line patency      Allergies    codeine (Hives)    Intolerances        Vital Signs Last 24 Hrs  T(C): 38.1 (23 May 2022 08:55), Max: 38.2 (23 May 2022 00:04)  T(F): 100.6 (23 May 2022 08:55), Max: 100.7 (23 May 2022 00:04)  HR: 76 (23 May 2022 08:00) (60 - 76)  BP: 104/54 (23 May 2022 08:00) (103/61 - 113/65)  BP(mean): 70 (23 May 2022 08:00) (66 - 80)  RR: 26 (23 May 2022 08:00) (23 - 26)  SpO2: 100% (23 May 2022 08:00) (93% - 100%)    PHYSICAL EXAM:  GENERAL: Laying in bed with eyes open and unresponsive   HEAD:  Atraumatic, Normocephalic  ENMT: Moist mucous membranes,   NECK: Supple, No JVD, Normal thyroid; Trach to Vent   CHEST/LUNG: Clear to auscultation bilaterally; No rales, rhonchi, wheezing, or rubs  HEART: Regular rate and rhythm; No murmurs, rubs, or gallops  ABDOMEN: Distended and PEG Tube +   EXTREMITIES:  2+ Peripheral Pulses, No clubbing, cyanosis, or edema      LABS:                        6.8    13.05 )-----------( 317      ( 23 May 2022 06:44 )             23.1     23 May 2022 06:44    143    |  105    |  47     ----------------------------<  205    3.2     |  29     |  1.48     Ca    8.0        23 May 2022 06:44  Phos  2.1       23 May 2022 06:44    TPro  5.4    /  Alb  1.9    /  TBili  0.4    /  DBili  x      /  AST  104    /  ALT  149    /  AlkPhos  136    23 May 2022 06:44    PT/INR - ( 23 May 2022 06:44 )   PT: 15.3 sec;   INR: 1.29 ratio         PTT - ( 22 May 2022 01:22 )  PTT:34.5 sec  Urinalysis Basic - ( 21 May 2022 20:51 )    Color: Yellow / Appearance: Clear / S.010 / pH: x  Gluc: x / Ketone: Negative  / Bili: Negative / Urobili: Negative mg/dL   Blood: x / Protein: 100 mg/dL / Nitrite: Negative   Leuk Esterase: Moderate / RBC: 0-2 /HPF / WBC 3-5   Sq Epi: x / Non Sq Epi: Occasional / Bacteria: x      CAPILLARY BLOOD GLUCOSE      POCT Blood Glucose.: 180 mg/dL (23 May 2022 08:37)  POCT Blood Glucose.: 163 mg/dL (23 May 2022 05:36)  POCT Blood Glucose.: 181 mg/dL (22 May 2022 23:37)  POCT Blood Glucose.: 116 mg/dL (22 May 2022 17:23)  POCT Blood Glucose.: 160 mg/dL (22 May 2022 11:35)      RADIOLOGY & ADDITIONAL TESTS:    Imaging Personally Reviewed:  [ ] YES     Consultant(s) Notes Reviewed:      Care Discussed with Consultants/Other Providers:    Advanced Directives: [ ] DNR  [ ] No feeding tube  [ ] MOLST in chart  [ ] MOLST completed today  [ ] Unknown

## 2022-05-24 LAB
ALBUMIN SERPL ELPH-MCNC: 1.8 G/DL — LOW (ref 3.3–5)
ALP SERPL-CCNC: 124 U/L — HIGH (ref 30–120)
ALT FLD-CCNC: 105 U/L DA — HIGH (ref 10–60)
ANION GAP SERPL CALC-SCNC: 10 MMOL/L — SIGNIFICANT CHANGE UP (ref 5–17)
AST SERPL-CCNC: 47 U/L — HIGH (ref 10–40)
BILIRUB SERPL-MCNC: 0.6 MG/DL — SIGNIFICANT CHANGE UP (ref 0.2–1.2)
BUN SERPL-MCNC: 40 MG/DL — HIGH (ref 7–23)
C DIFF BY PCR RESULT: SIGNIFICANT CHANGE UP
C DIFF TOX GENS STL QL NAA+PROBE: SIGNIFICANT CHANGE UP
CALCIUM SERPL-MCNC: 8 MG/DL — LOW (ref 8.4–10.5)
CHLORIDE SERPL-SCNC: 106 MMOL/L — SIGNIFICANT CHANGE UP (ref 96–108)
CO2 SERPL-SCNC: 28 MMOL/L — SIGNIFICANT CHANGE UP (ref 22–31)
CREAT SERPL-MCNC: 1.48 MG/DL — HIGH (ref 0.5–1.3)
EGFR: 36 ML/MIN/1.73M2 — LOW
GLUCOSE SERPL-MCNC: 198 MG/DL — HIGH (ref 70–99)
HCT VFR BLD CALC: 27 % — LOW (ref 34.5–45)
HGB BLD-MCNC: 8 G/DL — LOW (ref 11.5–15.5)
INR BLD: 1.2 RATIO — HIGH (ref 0.88–1.16)
IRON SATN MFR SERPL: 19 UG/DL — LOW (ref 30–160)
IRON SATN MFR SERPL: 9 % — LOW (ref 14–50)
LDH SERPL L TO P-CCNC: 381 U/L — HIGH (ref 50–242)
MCHC RBC-ENTMCNC: 25.2 PG — LOW (ref 27–34)
MCHC RBC-ENTMCNC: 29.6 GM/DL — LOW (ref 32–36)
MCV RBC AUTO: 84.9 FL — SIGNIFICANT CHANGE UP (ref 80–100)
MRSA PCR RESULT.: SIGNIFICANT CHANGE UP
NRBC # BLD: 0 /100 WBCS — SIGNIFICANT CHANGE UP (ref 0–0)
PLATELET # BLD AUTO: 291 K/UL — SIGNIFICANT CHANGE UP (ref 150–400)
POTASSIUM SERPL-MCNC: 3.6 MMOL/L — SIGNIFICANT CHANGE UP (ref 3.5–5.3)
POTASSIUM SERPL-SCNC: 3.6 MMOL/L — SIGNIFICANT CHANGE UP (ref 3.5–5.3)
PROT SERPL-MCNC: 5.7 G/DL — LOW (ref 6–8.3)
PROTHROM AB SERPL-ACNC: 14.2 SEC — HIGH (ref 10.5–13.4)
RBC # BLD: 3.18 M/UL — LOW (ref 3.8–5.2)
RBC # FLD: 20.2 % — HIGH (ref 10.3–14.5)
S AUREUS DNA NOSE QL NAA+PROBE: SIGNIFICANT CHANGE UP
SODIUM SERPL-SCNC: 144 MMOL/L — SIGNIFICANT CHANGE UP (ref 135–145)
TIBC SERPL-MCNC: 206 UG/DL — LOW (ref 220–430)
TROPONIN I, HIGH SENSITIVITY RESULT: 155.1 NG/L — HIGH
UIBC SERPL-MCNC: 187 UG/DL — SIGNIFICANT CHANGE UP (ref 110–370)
WBC # BLD: 14.23 K/UL — HIGH (ref 3.8–10.5)
WBC # FLD AUTO: 14.23 K/UL — HIGH (ref 3.8–10.5)

## 2022-05-24 PROCEDURE — 99233 SBSQ HOSP IP/OBS HIGH 50: CPT

## 2022-05-24 PROCEDURE — 71045 X-RAY EXAM CHEST 1 VIEW: CPT | Mod: 26

## 2022-05-24 RX ADMIN — CHLORHEXIDINE GLUCONATE 1 APPLICATION(S): 213 SOLUTION TOPICAL at 05:11

## 2022-05-24 RX ADMIN — HEPARIN SODIUM 5000 UNIT(S): 5000 INJECTION INTRAVENOUS; SUBCUTANEOUS at 05:10

## 2022-05-24 RX ADMIN — NYSTATIN CREAM 1 APPLICATION(S): 100000 CREAM TOPICAL at 21:52

## 2022-05-24 RX ADMIN — Medication 1 MILLIGRAM(S): at 18:00

## 2022-05-24 RX ADMIN — NYSTATIN CREAM 1 APPLICATION(S): 100000 CREAM TOPICAL at 14:34

## 2022-05-24 RX ADMIN — Medication 1 MILLIGRAM(S): at 06:30

## 2022-05-24 RX ADMIN — Medication 1 APPLICATION(S): at 12:44

## 2022-05-24 RX ADMIN — Medication 2: at 11:50

## 2022-05-24 RX ADMIN — Medication 1 MILLIGRAM(S): at 04:00

## 2022-05-24 RX ADMIN — Medication 1 TABLET(S): at 11:49

## 2022-05-24 RX ADMIN — Medication 1 MILLIGRAM(S): at 11:06

## 2022-05-24 RX ADMIN — PANTOPRAZOLE SODIUM 40 MILLIGRAM(S): 20 TABLET, DELAYED RELEASE ORAL at 11:49

## 2022-05-24 RX ADMIN — Medication 4: at 05:13

## 2022-05-24 RX ADMIN — Medication 1 MILLIGRAM(S): at 21:52

## 2022-05-24 RX ADMIN — HEPARIN SODIUM 5000 UNIT(S): 5000 INJECTION INTRAVENOUS; SUBCUTANEOUS at 21:52

## 2022-05-24 RX ADMIN — LINEZOLID 300 MILLIGRAM(S): 600 INJECTION, SOLUTION INTRAVENOUS at 17:07

## 2022-05-24 RX ADMIN — Medication 1 MILLIGRAM(S): at 14:33

## 2022-05-24 RX ADMIN — NYSTATIN CREAM 1 APPLICATION(S): 100000 CREAM TOPICAL at 05:10

## 2022-05-24 RX ADMIN — HEPARIN SODIUM 5000 UNIT(S): 5000 INJECTION INTRAVENOUS; SUBCUTANEOUS at 14:33

## 2022-05-24 RX ADMIN — CHLORHEXIDINE GLUCONATE 15 MILLILITER(S): 213 SOLUTION TOPICAL at 05:17

## 2022-05-24 RX ADMIN — LINEZOLID 300 MILLIGRAM(S): 600 INJECTION, SOLUTION INTRAVENOUS at 05:09

## 2022-05-24 RX ADMIN — Medication 1 MILLIGRAM(S): at 02:42

## 2022-05-24 RX ADMIN — Medication 1 MILLIGRAM(S): at 10:10

## 2022-05-24 RX ADMIN — CHLORHEXIDINE GLUCONATE 15 MILLILITER(S): 213 SOLUTION TOPICAL at 17:07

## 2022-05-24 RX ADMIN — Medication 4: at 18:10

## 2022-05-24 RX ADMIN — INSULIN GLARGINE 5 UNIT(S): 100 INJECTION, SOLUTION SUBCUTANEOUS at 07:52

## 2022-05-24 NOTE — PROGRESS NOTE ADULT - ASSESSMENT
75 yo female with PMHx chronic respiratory failure s/p trach and peg, HTN, T2DM, CVA, GERD, and dementia admitted with acute on chronic respiratory failure, shock, UTI, PNA, b/l pleural effusions, RYAN.      NEURO:  -Off sedatives, currently at baseline mentation.   -Home Ativan 1mg q4h PO via PEG, Ativan 1mg q6h IVP PRN.    CV:  -Shock resolved, weaned off vasopressors.   -Keep MAP >65.   -Mild troponin elevation suspected type II demand MI related to shock and ATN, continue to trend until peak and downtrending. EKG reviewed no STEMI.    RESP:  -Patient currently on full vent support, acute on chronic hypoxic respiratory failure continues to improve weaned to +5/.50  -Titrate settings to maintain SaO2 >90%, or pH >7.25 and plateau pressure goal <30  -Peridex oral care  -Aggressive pulmonary toileting    RENAL:  -Ischemic ATN improving with resuscitation  -trend lytes/Scr daily with BMP  -I's and O's, goal UOP 0.5 cc/kg/hr  -renal dose meds and avoid nephrotoxins     GI:  -Advance tube feeds as tolerated  -Protonix QD  - Transaminitis improving    ENDO:  -Aggressive glycemic control to keep goal blood glucose to 110-180    ID:  -Afebrile, leukocytosis improving, Lactate cleared.   -BCx and UCx NTD.   -Linezolid, Avycaz as per ID given h/o Serratia and CRE Pseudomonas    HEME:  -Acute on chronic anemia, improved. Repeat CBC pending.   -DVT ppx SC Heparin

## 2022-05-24 NOTE — PROGRESS NOTE ADULT - SUBJECTIVE AND OBJECTIVE BOX
Date/Time Patient Seen:  		  Referring MD:   Data Reviewed	       Patient is a 76y old  Female who presents with a chief complaint of Acute respiratory distress. (24 May 2022 06:04)      Subjective/HPI     PAST MEDICAL & SURGICAL HISTORY:  Dementia of frontal lobe type    Aphasic stroke    Diabetes mellitus    Respiratory failure    Hypertension    GERD (gastroesophageal reflux disease)    Constipation    Respiratory failure    CVA (cerebral vascular accident)    HTN (hypertension)    DM (diabetes mellitus)    Advanced dementia    COVID-19 virus detected    Quadriplegia    Pneumonia    Type II diabetes mellitus    Hx of appendectomy    Gastrostomy in place    Tracheostomy in place    Tracheostomy tube present    Feeding by G-tube          Medication list         MEDICATIONS  (STANDING):  ceftazidime/avibactam IVPB 1.25 Gram(s) IV Intermittent every 12 hours  chlorhexidine 0.12% Liquid 15 milliLiter(s) Oral Mucosa every 12 hours  chlorhexidine 2% Cloths 1 Application(s) Topical <User Schedule>  chlorhexidine 4% Liquid 1 Application(s) Topical <User Schedule>  dextrose 5%. 1000 milliLiter(s) (50 mL/Hr) IV Continuous <Continuous>  dextrose 5%. 1000 milliLiter(s) (100 mL/Hr) IV Continuous <Continuous>  dextrose 50% Injectable 25 Gram(s) IV Push once  dextrose 50% Injectable 12.5 Gram(s) IV Push once  dextrose 50% Injectable 25 Gram(s) IV Push once  glucagon  Injectable 1 milliGRAM(s) IntraMuscular once  heparin   Injectable 5000 Unit(s) SubCutaneous every 8 hours  insulin glargine Injectable (LANTUS) 5 Unit(s) SubCutaneous every morning  insulin lispro (ADMELOG) corrective regimen sliding scale   SubCutaneous every 6 hours  lactobacillus acidophilus 1 Tablet(s) Oral daily  linezolid  IVPB 600 milliGRAM(s) IV Intermittent every 12 hours  LORazepam     Tablet 1 milliGRAM(s) Oral every 4 hours  mineral oil/petrolatum Hydrophilic Ointment 1 Application(s) Topical daily  nystatin Powder 1 Application(s) Topical three times a day  pantoprazole  Injectable 40 milliGRAM(s) IV Push daily    MEDICATIONS  (PRN):  acetaminophen    Suspension .. 650 milliGRAM(s) Oral every 6 hours PRN Temp greater or equal to 38C (100.4F), Mild Pain (1 - 3)  albuterol/ipratropium for Nebulization 3 milliLiter(s) Nebulizer every 6 hours PRN Shortness of Breath and/or Wheezing  dextrose Oral Gel 15 Gram(s) Oral once PRN Blood Glucose LESS THAN 70 milliGRAM(s)/deciliter  LORazepam   Injectable 1 milliGRAM(s) IV Push every 6 hours PRN agitation/vent dyssynchrony  sodium chloride 0.9% lock flush 10 milliLiter(s) IV Push every 1 hour PRN Pre/post blood products, medications, blood draw, and to maintain line patency         Vitals log        ICU Vital Signs Last 24 Hrs  T(C): 36.9 (24 May 2022 03:57), Max: 38.1 (23 May 2022 08:55)  T(F): 98.4 (24 May 2022 03:57), Max: 100.6 (23 May 2022 08:55)  HR: 70 (24 May 2022 06:00) (67 - 123)  BP: 102/55 (24 May 2022 06:00) (90/56 - 117/64)  BP(mean): 69 (24 May 2022 06:00) (64 - 81)  ABP: 128/55 (23 May 2022 08:00) (128/55 - 128/55)  ABP(mean): 80 (23 May 2022 08:00) (80 - 80)  RR: 26 (24 May 2022 06:00) (23 - 28)  SpO2: 96% (24 May 2022 06:00) (84% - 100%)       Mode: AC/ CMV (Assist Control/ Continuous Mandatory Ventilation)  RR (machine): 26  TV (machine): 400  FiO2: 50  PEEP: 5  ITime: 1  MAP: 19  PIP: 36      Input and Output:  I&O's Detail    22 May 2022 07:01  -  23 May 2022 07:00  --------------------------------------------------------  IN:    FentaNYL: 4 mL    IV PiggyBack: 850 mL    Jevity 1.5: 260 mL    Norepinephrine: 19 mL    Propofol: 32 mL  Total IN: 1165 mL    OUT:    Indwelling Catheter - Urethral (mL): 1700 mL  Total OUT: 1700 mL    Total NET: -535 mL      23 May 2022 07:01  -  24 May 2022 06:38  --------------------------------------------------------  IN:    IV PiggyBack: 400 mL    Jevity 1.5: 240 mL  Total IN: 640 mL    OUT:  Total OUT: 0 mL    Total NET: 640 mL          Lab Data                        8.2    x     )-----------( x        ( 23 May 2022 20:09 )             x        05-23    143  |  105  |  47<H>  ----------------------------<  205<H>  3.2<L>   |  29  |  1.48<H>    Ca    8.0<L>      23 May 2022 06:44  Phos  2.1     05-23    TPro  5.4<L>  /  Alb  1.9<L>  /  TBili  0.4  /  DBili  x   /  AST  104<H>  /  ALT  149<H>  /  AlkPhos  136<H>  05-23    ABG - ( 23 May 2022 05:38 )  pH, Arterial: 7.55  pH, Blood: x     /  pCO2: 34    /  pO2: 81    / HCO3: 30    / Base Excess: 7.3   /  SaO2: 97.8                    Review of Systems	      Objective     Physical Examination    heart s1s2  lung dec BS  abd soft      Pertinent Lab findings & Imaging      Annalise:  NO   Adequate UO     I&O's Detail    22 May 2022 07:01  -  23 May 2022 07:00  --------------------------------------------------------  IN:    FentaNYL: 4 mL    IV PiggyBack: 850 mL    Jevity 1.5: 260 mL    Norepinephrine: 19 mL    Propofol: 32 mL  Total IN: 1165 mL    OUT:    Indwelling Catheter - Urethral (mL): 1700 mL  Total OUT: 1700 mL    Total NET: -535 mL      23 May 2022 07:01  -  24 May 2022 06:38  --------------------------------------------------------  IN:    IV PiggyBack: 400 mL    Jevity 1.5: 240 mL  Total IN: 640 mL    OUT:  Total OUT: 0 mL    Total NET: 640 mL               Discussed with:     Cultures:	        Radiology

## 2022-05-24 NOTE — PROGRESS NOTE ADULT - SUBJECTIVE AND OBJECTIVE BOX
[facility], Division of Infectious Diseases  [facility] Lora Y. Patel, [facility]. [facility], [facility]  [phone]    Name: BREA BECKHAM  Age: 76y  Gender: Female  MRN: [id number]    Interval History:  Patient seen and examined at bedside this morning  No acute overnight events. Afebrile  Continues to remain on the vent  Notes reviewed    Antibiotics:  ceftazidime/avibactam IVPB 1.25 Gram(s) IV Intermittent every 12 hours  linezolid  IVPB 600 milliGRAM(s) IV Intermittent every 12 hours      Medications:  acetaminophen    Suspension .. 650 milliGRAM(s) Oral every 6 hours PRN  albuterol/ipratropium for Nebulization 3 milliLiter(s) Nebulizer every 6 hours PRN  ceftazidime/avibactam IVPB 1.25 Gram(s) IV Intermittent every 12 hours  chlorhexidine 0.12% Liquid 15 milliLiter(s) Oral Mucosa every 12 hours  chlorhexidine 2% Cloths 1 Application(s) Topical <User Schedule>  chlorhexidine 4% Liquid 1 Application(s) Topical <User Schedule>  dextrose 5%. 1000 milliLiter(s) IV Continuous <Continuous>  dextrose 5%. 1000 milliLiter(s) IV Continuous <Continuous>  dextrose 50% Injectable 25 Gram(s) IV Push once  dextrose 50% Injectable 12.5 Gram(s) IV Push once  dextrose 50% Injectable 25 Gram(s) IV Push once  dextrose Oral Gel 15 Gram(s) Oral once PRN  glucagon  Injectable 1 milliGRAM(s) IntraMuscular once  heparin   Injectable 5000 Unit(s) SubCutaneous every 8 hours  insulin glargine Injectable (LANTUS) 5 Unit(s) SubCutaneous every morning  insulin lispro (ADMELOG) corrective regimen sliding scale   SubCutaneous every 6 hours  lactobacillus acidophilus 1 Tablet(s) Oral daily  linezolid  IVPB 600 milliGRAM(s) IV Intermittent every 12 hours  LORazepam     Tablet 1 milliGRAM(s) Oral every 4 hours  LORazepam   Injectable 1 milliGRAM(s) IV Push every 6 hours PRN  mineral oil/petrolatum Hydrophilic Ointment 1 Application(s) Topical daily  nystatin Powder 1 Application(s) Topical three times a day  pantoprazole  Injectable 40 milliGRAM(s) IV Push daily  sodium chloride 0.9% lock flush 10 milliLiter(s) IV Push every 1 hour PRN      Review of Systems:  unable to obtain    Allergies: codeine (Hives)    For details regarding the patient's past medical history, social history, family history, and other miscellaneous elements, please refer the initial infectious diseases consultation and/or the admitting history and physical examination for this admission.    Objective:  Vitals:   T(C): 37.4 (05-24-22 @ 08:30), Max: 37.4 (05-24-22 @ 08:30)  HR: 72 (05-24-22 @ 10:23) (67 - 120)  BP: 97/51 (05-24-22 @ 09:00) (90/56 - 117/64)  RR: 24 (05-24-22 @ 09:00) (23 - 28)  SpO2: 98% (05-24-22 @ 10:23) (84% - 100%)    Mode: AC/ CMV (Assist Control/ Continuous Mandatory Ventilation)  RR (machine): 26  TV (machine): 400  FiO2: 50  PEEP: 5  ITime: 0.9  MAP: 19  PIP: 39      Physical Examination:  General: chronically ill appearing W  HEENT: NC/AT  Neck: trach to vent  Cardio: S1, S2 heard, RRR, no murmurs  Resp: MV breath sounds  Abd: soft, NT, ND, PGT in place  Neuro: nonverbal  Ext: no edema or cyanosis  Skin: warm, dry    Laboratory Studies:  CBC:                       8.0    14.23 )-----------( 291      ( 24 May 2022 06:56 )             27.0     CMP: 05-24    144  |  106  |  40<H>  ----------------------------<  198<H>  3.6   |  28  |  1.48<H>    Ca    8.0<L>      24 May 2022 06:56  Phos  2.1     05-23    TPro  5.7<L>  /  Alb  1.8<L>  /  TBili  0.6  /  DBili  x   /  AST  47<H>  /  ALT  105<H>  /  AlkPhos  124<H>  05-24    LIVER FUNCTIONS - ( 24 May 2022 06:56 )  Alb: 1.8 g/dL / Pro: 5.7 g/dL / ALK PHOS: 124 U/L / ALT: 105 U/L DA / AST: 47 U/L / GGT: x               Microbiology: reviewed    Culture - Sputum (collected 05-23-22 @ 11:20)  Source: Trach Asp Tracheal Aspirate  Gram Stain (05-23-22 @ 21:29):    Moderate polymorphonuclear leukocytes per low power field    No Squamous epithelial cells per low power field    Moderate Gram Negative Rods per oil power field    Culture - Blood (collected 05-21-22 @ 21:57)  Source: .Blood Blood-Peripheral  Preliminary Report (05-23-22 @ 14:01):    No growth to date.    Culture - Blood (collected 05-21-22 @ 21:57)  Source: .Blood Blood-Peripheral  Preliminary Report (05-23-22 @ 14:01):    No growth to date.    Culture - Urine (collected 05-21-22 @ 20:51)  Source: Clean Catch Clean Catch (Midstream)  Final Report (05-23-22 @ 10:12):    <10,000 CFU/mL Normal Urogenital Elvira        Radiology: reviewed    < from: CT Chest No Cont (05.21.22 @ 23:49) >    ACC: [id number] EXAM:  CT CHEST                          PROCEDURE DATE:  05/21/2022          INTERPRETATION:  CT CHEST WITHOUT CONTRAST    INDICATION: Shortness of breath. Hypoxia. History of hypertension,   diabetes and cerebrovascular accident. Suspected acute on chronic   respiratory failure with hypoxia.    TECHNIQUE: Unenhanced helical images were obtained of the chest. Coronal   and sagittal images were reconstructed. Maximum intensity projection   images were generated.    COMPARISON: Radiograph 5/21/2022. CT chest 4/21/2019, 1/6/2022,   12/20/2021.    FINDINGS:    Tubes/Lines: Tracheostomy.    Lungs, airways and pleura: There are new bilateral groundglass opacities   (most pronounced in the upper lobes), septal thickening, and subtleareas   of airway dilatation in the upper lobes.    No change in the bilateral (large) pleural effusions (left greater than   right). No change in their near complete consolidation of the left lower   lobe. There are patchy areas of consolidation in the upper lobes. No   change in the right lower lobe passive atelectasis.    The right lower lobe calcifications are unchanged.    Mediastinum: The visualized thyroid gland is unremarkable. The esophagus   is unremarkable. There are no enlarged chest lymph nodes.    The heart is normal in size. Coronary artery calcifications. Small amount   of pericardial fluid. Mitral annular calcification. The aorta is normal   in caliber. Atheromatous disease of the aorta.    Upper Abdomen: The thickening of the left adrenal gland is unchanged.   Percutaneous gastrostomy tube in place.    Bones And Soft Tissues: Degenerative change of the spine. Since   4/21/2022, the anterior wedging of the L2 vertebral body and sclerosis of   the L2 vertebral body is unchanged allowing for differences in technique.    The soft tissues are unremarkable.      IMPRESSION:    1.  New bilateral groundglass opacities, patchy consolidation, and septal   thickening are of uncertain etiology.  2.  No change in the bilateral pleural effusions (left greater than   right), the near complete consolidation of the left lower lobe and right   lower lobe    --- End of Report ---            OLIVIA HOBSON MD; Attending Radiologist  This document has been electronically signed. May 22 2022 10:58AM    < end of copied text >    < from: US Abdomen Upper Quadrant Right (05.23.22 @ 08:55) >    ACC: [id number] EXAM:  US ABDOMEN RT UPR QUADRANT                          PROCEDURE DATE:  05/23/2022          INTERPRETATION:  CLINICAL INFORMATION: Elevated liver function test    COMPARISON: Ultrasound 11/7/2021, CT 4/21/2022    TECHNIQUE: Sonography of the right upper quadrant.    FINDINGS:  Liver: Increased echogenicity of the hepatic parenchyma with minimal   coarsening the echotexture may reflect hepatic steatosis and/or chronic   fibrofatty parenchymal disease the liver.  Bile ducts: Normal caliber. Common bile duct measures 0.2 cm.  Gallbladder: Tiny gallstones and/or sludge. Trace pericholecystic fluid.   Borderline gallbladder wall thickening. No sonographic Bell sign.  Pancreas: Visualized portions are within normal limits.  Right kidney: 10.2 (cm). No hydronephrosis.  Ascites: None.  IVC: Visualized portions are within normal limits.    Incidental note of a right pleural effusion.    IMPRESSION:  Findings in the liver which may represent chronic fibrofatty parenchymal   disease of the liver and/or hepatic steatosis.    Tiny gallstones and/or gallbladder sludge with borderline gallbladder   wall thickening and trace pericholecystic fluid.    --- End of Report ---            TAURUS ALEGRIA MD; Attending Radiologist  This document has been electronically signed. May 23 2022 11:25AM    < end of copied text >

## 2022-05-24 NOTE — PROGRESS NOTE ADULT - ASSESSMENT
REVIEW OF SYMPTOMS      Able to give (reliable) ROS  NO     PHYSICAL EXAM    HEENT Unremarkable  atraumatic   RESP Fair air entry EXP prolonged    Harsh breath sound Resp distres mild   CARDIAC S1 S2 No S3     NO JVD    ABDOMEN SOFT BS PRESENT NOT DISTENDED No hepatosplenomegaly   PEDAL EDEMA present No calf tenderness  NO rash       AGE/SEX.   76 f    DOA.  5/21/2022     CC .  5/21/2022 AOC RESP FAILURE     PMH .  pmh Hosp stay given zosyn 4/21  pmh Pneumonia    pmh HTN,   pmh DM,   pmh CVA,   pmh  chronic respiratory failure   pmh s/p trach, PEG,   pmh cardiac arrest        MAIN ISSUES  .  SEVERE HYPOXIC RESP FAILURE poa   VAP poa   SHOCK poa   BL PL EFFSNS   CHF  PULM HYTN   ANEMIA 5/23/2022 Hb 6.8   ELEVATED LFTS     COVID/ICU/CODE STATUS.                       COVID  STATUS. 5/21/2022 scv2 (-)           ICU STAY. 5/21  GOC.  5/24/2022 full code    BEST PRACTICE ISSUES.                                                  HEAD OF BED ELEVATION. Yes  DVT PROPHYLAXIS.     5/21/2022 hpsc    SQUIRES PROPHYLAXIS.5/22/2022 protonix 40   INFECTION PROPHYLAXIS.   5/21/2022 Ch;lorhexidine .12%   5/21/2022 Chlorhexidine 2%                                                                                         DIET.   5/22/2022 jevity 1.5 20 gt     VITALS/PO/IO/VENT/DRIPS.     5/24/2022 afeb 76 99/50   5/24/2022 10a ac 26/400/5/.5      PROBLEM DATA/ASSESSMENT RECOMMENDATIONS (A/R).    HEMODYNAMICS.   Monitor and optimize bp   Target MAP to miguel  65 (+)    RESP.   Monitor and optimize  po   Target po to remain 90-95%    ABG.  5/23/2022 5a ac 26/400/10/70%  755/34/81   5/22/2022 5a vent 90% 744/41/117   5/21/2022 8p 100% vent 739/50/48    PMH/PSH PROBLEMS.  Management continued/modified as indicated      VENT MANAGEMENT.   HOB elevation  Target Pplat 30 (-)  Target PO 90-95%  Target pH 730 (+)  Daily spontaneous breathing trials   Daily sedation vacation         DIARRHEA.  5/23/2022 c diff (-)     PICC poa   5/23/2022 Ordered to dc picc    INFECTION SOURCE DD.   INFECTION A/R.   Has ho crp   Has ho iv abio within last 90 d  Is on bsab   id eval Dr BRITTANY Brantley appreciated   Started on ceftazidime avibactam 1.25x2 5/22/2022        MICROBIO.  Rvp 5/21/2022 (-)   urine c 5/21 (-)   blod c 5/21 (-)   sputum 5/23 numerous pseudomonas   c diff 5/23/2022 c diff (-)   PAST MICROBIO.  4/24/2022 sputum CRABAPENEM RESISTANT PSEUDOMONAS  ABIO.  5/22/2022 ceftazidime avibactam 1.25x2   5/21/2022 linezolid    RO VTE.  V duplx 5/23/2022 (-)     COPD.  5/22/2022 duoneb     PLEURAL EFFSNS.  echo 9/8/2021   n lvsf  mild dd  n rvsf  rvsp 51   ct 4/22/2022 ct ch 4/21/2022   bl effsns increased in size cw 1/2022 5/23/2022 dw  in detail spent over 15 min explained rbaa thoracentesis including the increased risk on vent patients   A/R   5/23/2022 Thorac ordered (discussed in rounds consensus was to get thorac)     MI.  5/23-5/24/2022 Tr 258-155     CHF.  echo 9/8/2021   n lvsf   mild dd   n rvsf   rvsp 51   bnp 5/21/2022 bnp [id number]   5/22/2022 cardio consulted      ANEMIA.   Hb 5/21-5/22-5/23-5/24/2022   Hb 8 - 7.2 - 6.8 - 8   monitor  target hb 7 (+)     TRANSFUSION.  5/23/2022 1 u prbc    RYAN.  Na 5/21/2022 Na 140  CO2 5/21/2022 CO2 30   Cr 5/21-5/22-5/23-5/24/2022   Cr 1.6 - 1.5- 1.4 - 1.4    monitor     ELEVATED LFTS.  LFTS 5/22-5/23-5/24/2022  - 136-124   - 104-47   - 149 - 105  5/22/2022 us abd fibrofatty liver dis borderline gbw thick tr perichole fluid   5/22/2022 check hep panel    TIME SPENT   Over 36  minutes aggregate critical care time spent on encounter; activities included   direct patient care, counseling and/or coordinating care reviewing notes, lab data/ imaging , discussion with multidisciplinary team/ patient  /family and explaining in detail risks, benefits, alternatives  of the recommendations     CHAPINCITO GUIDRY 76 f [facility] S 5/21/2022

## 2022-05-24 NOTE — PROGRESS NOTE ADULT - ASSESSMENT
79F HTN, DM2, COPD, Chronic Respiratory Failure on Trach to Vent admitted for Acute on Chronic Respiratory Failure.     Acute on Chronic Respiratory Failure / Septic Shock   Aspiration vs. VAP vs ; History of CRE and Psuedomonas; Continues to have low grade temps  CT imaging shows B/L Pleural Effusion and could be parapneumonic effusion; IR guided thoracentesis being arranged   IV Ceftazidime + Linezolid + Levophed   Follow up all cultures  ID and Pulmonary consults appreciated     Anemia of Chronic Disease   Has been evaluated by GI and Hematology on prior admissions  She has Anemia of Chronic Disease but could also have intermittent GI Bleeding; Check occult blood   S/P 1U PRBC Transfusion and Hgb appropriate response     Acute Renal Failure   Most likely due to Sepsis   Will continue gentle hydration and maintain BP   Renally dose and monitor BMP and electrolytes     HTN  Hold all BP lower agents    DM2  FS and on ISS to maintain BS <180    Diet  Tube Feeds    DVT Prophylaxis  Heparin    Disposition  Full Code   Discharge planning pending hospital course

## 2022-05-24 NOTE — PROGRESS NOTE ADULT - SUBJECTIVE AND OBJECTIVE BOX
BREA BECKHAM     [facility]    Allergies    codeine (Hives)    Intolerances        PAST MEDICAL & SURGICAL HISTORY:  Dementia of frontal lobe type      Aphasic stroke      Diabetes mellitus      Respiratory failure      Hypertension      GERD (gastroesophageal reflux disease)      Constipation      Respiratory failure      CVA (cerebral vascular accident)      HTN (hypertension)      DM (diabetes mellitus)      Advanced dementia      COVID-19 virus detected      Quadriplegia      Pneumonia      Type II diabetes mellitus      Hx of appendectomy      Gastrostomy in place      Tracheostomy in place      Tracheostomy tube present      Feeding by G-tube          FAMILY HISTORY:  No pertinent family history in first degree relatives        Home Medications:  albuterol 90 mcg/inh inhalation aerosol with adapter: 2  inhaled every 6 hours (21 May 2022 21:16)  Bacid (LAC) oral tablet: 2 tab(s) by gastrostomy tube once a day (21 May 2022 21:16)  Basaglar KwikPen 100 units/mL subcutaneous solution: 36 unit(s) subcutaneous once a day (at bedtime) (21 May 2022 21:16)  Betadine 10% topical swab: cleanse right and left great toes (21 May 2022 21:16)  Carafate 1 g/10 mL oral suspension: 10 milliliter(s) by gastrostomy tube 4 times a day (before meals and at bedtime) for 14 days (Started 6/4/21) (21 May 2022 21:16)  chlorhexidine 0.12% mucous membrane liquid: 15 milliliter(s) mucous membrane 2 times a day (21 May 2022 21:16)  Eucerin topical cream: Apply topically to affected area once a day bilateral feet (21 May 2022 21:16)  folic acid 1 mg oral tablet: 1 tab(s) orally once a day (21 May 2022 21:16)  ipratropium-albuterol 0.5 mg-2.5 mg/3 mL inhalation solution: 3 milliliter(s) inhaled 4 times a day (21 May 2022 21:16)  LORazepam 1 mg oral tablet: 1 tab(s) by gastrostomy tube every 4 hours (21 May 2022 21:16)  methocarbamol 500 mg oral tablet: 1 tab(s) by gastrostomy tube 2 times a day (21 May 2022 21:16)  MiraLax oral powder for reconstitution:  (21 May 2022 23:14)  Multiple Vitamins oral tablet: 1 tab(s) orally once a day (21 May 2022 21:16)  omeprazole 20 mg oral delayed release capsule: orally 2 times a day (21 May 2022 23:14)  polyethylene glycol 3350 oral powder for reconstitution: 17 gram(s) by gastrostomy tube every 12 hours (21 May 2022 21:16)  senna 8.6 mg oral tablet: 3 tab(s) by gastrostomy tube once a day (at bedtime) (21 May 2022 21:16)  simethicone 80 mg oral tablet, chewable: 1 tab(s) by gastrostomy tube every 6 hours (21 May 2022 21:16)  Tylenol 325 mg oral tablet: 2 tab(s) by gastrostomy tube once a day; 60 minutes prior to dressing change  (21 May 2022 21:16)  Tylenol 325 mg oral tablet: 2 tab(s) by gastrostomy tube every 6 hours, As Needed (21 May 2022 21:16)  Zosyn:  (21 May 2022 23:14)      MEDICATIONS  (STANDING):  ceftazidime/avibactam IVPB 1.25 Gram(s) IV Intermittent every 12 hours  chlorhexidine 0.12% Liquid 15 milliLiter(s) Oral Mucosa every 12 hours  chlorhexidine 2% Cloths 1 Application(s) Topical <User Schedule>  chlorhexidine 4% Liquid 1 Application(s) Topical <User Schedule>  dextrose 5%. 1000 milliLiter(s) (50 mL/Hr) IV Continuous <Continuous>  dextrose 5%. 1000 milliLiter(s) (100 mL/Hr) IV Continuous <Continuous>  dextrose 50% Injectable 25 Gram(s) IV Push once  dextrose 50% Injectable 12.5 Gram(s) IV Push once  dextrose 50% Injectable 25 Gram(s) IV Push once  glucagon  Injectable 1 milliGRAM(s) IntraMuscular once  heparin   Injectable 5000 Unit(s) SubCutaneous every 8 hours  insulin glargine Injectable (LANTUS) 5 Unit(s) SubCutaneous every morning  insulin lispro (ADMELOG) corrective regimen sliding scale   SubCutaneous every 6 hours  lactobacillus acidophilus 1 Tablet(s) Oral daily  linezolid  IVPB 600 milliGRAM(s) IV Intermittent every 12 hours  LORazepam     Tablet 1 milliGRAM(s) Oral every 4 hours  mineral oil/petrolatum Hydrophilic Ointment 1 Application(s) Topical daily  nystatin Powder 1 Application(s) Topical three times a day  pantoprazole  Injectable 40 milliGRAM(s) IV Push daily    MEDICATIONS  (PRN):  acetaminophen    Suspension .. 650 milliGRAM(s) Oral every 6 hours PRN Temp greater or equal to 38C (100.4F), Mild Pain (1 - 3)  albuterol/ipratropium for Nebulization 3 milliLiter(s) Nebulizer every 6 hours PRN Shortness of Breath and/or Wheezing  dextrose Oral Gel 15 Gram(s) Oral once PRN Blood Glucose LESS THAN 70 milliGRAM(s)/deciliter  LORazepam   Injectable 1 milliGRAM(s) IV Push every 6 hours PRN agitation/vent dyssynchrony  sodium chloride 0.9% lock flush 10 milliLiter(s) IV Push every 1 hour PRN Pre/post blood products, medications, blood draw, and to maintain line patency      Diet, NPO with Tube Feed:   Tube Feeding Modality: Gastrostomy  Jevity 1.5 Horacio  Total Volume for 24 Hours (mL): 480  Continuous  Starting Tube Feed Rate mL per Hour: 20  Until Goal Tube Feed Rate (mL per Hour): 20  Tube Feed Duration (in Hours): 24  Tube Feed Start Time: 18:30 (05-22-22 @ 18:06) [Active]  Diet, NPO with Tube Feed:   Tube Feeding Modality: Gastrostomy  Glucerna 1.5 Horacio  Total Volume for 24 Hours (mL): 1200  Continuous  Starting Tube Feed Rate mL per Hour: 20  Increase Tube Feed Rate by (mL): 10     Every 6 hours  Until Goal Tube Feed Rate (mL per Hour): 60  Tube Feed Duration (in Hours): 20  Tube Feed Start Time: 00:00 (05-22-22 @ 11:42) [Pending Verification By Attending]          Vital Signs Last 24 Hrs  T(C): 36.9 (24 May 2022 03:57), Max: 37.4 (23 May 2022 10:00)  T(F): 98.4 (24 May 2022 03:57), Max: 99.4 (23 May 2022 10:00)  HR: 73 (24 May 2022 09:00) (67 - 120)  BP: 97/51 (24 May 2022 09:00) (90/56 - 117/64)  BP(mean): 65 (24 May 2022 09:00) (64 - 81)  RR: 24 (24 May 2022 09:00) (23 - 28)  SpO2: 100% (24 May 2022 09:00) (84% - 100%)      05-23-22 @ 07:01  -  05-24-22 @ 07:00  --------------------------------------------------------  IN: 640 mL / OUT: 0 mL / NET: 640 mL    05-24-22 @ 07:01  -  05-24-22 @ 09:51  --------------------------------------------------------  IN: 60 mL / OUT: 0 mL / NET: 60 mL        Mode: AC/ CMV (Assist Control/ Continuous Mandatory Ventilation), RR (machine): 26, TV (machine): 400, FiO2: 50, PEEP: 5, ITime: 0.9, MAP: 18, PIP: 37      LABS:                        8.0    14.23 )-----------( 291      ( 24 May 2022 06:56 )             27.0     05-24    144  |  106  |  40<H>  ----------------------------<  198<H>  3.6   |  28  |  1.48<H>    Ca    8.0<L>      24 May 2022 06:56  Phos  2.1     05-23    TPro  5.7<L>  /  Alb  1.8<L>  /  TBili  0.6  /  DBili  x   /  AST  47<H>  /  ALT  105<H>  /  AlkPhos  124<H>  05-24    PT/INR - ( 24 May 2022 06:56 )   PT: 14.2 sec;   INR: 1.20 ratio               ABG - ( 23 May 2022 05:38 )  pH, Arterial: 7.55  pH, Blood: x     /  pCO2: 34    /  pO2: 81    / HCO3: 30    / Base Excess: 7.3   /  SaO2: 97.8                WBC:  WBC Count: 14.23 K/uL (05-24 @ 06:56)  WBC Count: 13.05 K/uL (05-23 @ 06:44)  WBC Count: 13.09 K/uL (05-22 @ 06:23)  WBC Count: 21.57 K/uL (05-22 @ 01:22)  WBC Count: 20.49 K/uL (05-21 @ 22:30)  WBC Count: 22.94 K/uL (05-21 @ 20:28)      MICROBIOLOGY:  RECENT CULTURES:  05-23 Trach Asp Tracheal Aspirate XXXX   Moderate polymorphonuclear leukocytes per low power field  No Squamous epithelial cells per low power field  Moderate Gram Negative Rods per oil power field XXXX    05-21 .Blood Blood-Peripheral XXXX XXXX   No growth to date.    05-21 Clean Catch Clean Catch (Midstream) XXXX XXXX   <10,000 CFU/mL Normal Urogenital Elvira                PT/INR - ( 24 May 2022 06:56 )   PT: 14.2 sec;   INR: 1.20 ratio             Sodium:  Sodium, Serum: 144 mmol/L (05-24 @ 06:56)  Sodium, Serum: 143 mmol/L (05-23 @ 06:44)  Sodium, Serum: 140 mmol/L (05-22 @ 06:23)  Sodium, Serum: 140 mmol/L (05-22 @ 01:22)  Sodium, Serum: 140 mmol/L (05-21 @ 22:30)  Sodium, Serum: 141 mmol/L (05-21 @ 20:28)      1.48 mg/dL 05-24 @ 06:56  1.48 mg/dL 05-23 @ 06:44  1.59 mg/dL 05-22 @ 06:23  1.72 mg/dL 05-22 @ 01:22  1.65 mg/dL 05-21 @ 22:30  1.62 mg/dL 05-21 @ 20:28      Hemoglobin:  Hemoglobin: 8.0 g/dL (05-24 @ 06:56)  Hemoglobin: 8.2 g/dL (05-23 @ 20:09)  Hemoglobin: 6.8 g/dL (05-23 @ 06:44)  Hemoglobin: 7.2 g/dL (05-22 @ 06:23)  Hemoglobin: 7.3 g/dL (05-22 @ 01:22)  Hemoglobin: 8.0 g/dL (05-21 @ 22:30)  Hemoglobin: 8.1 g/dL (05-21 @ 20:28)      Platelets: Platelet Count - Automated: 291 K/uL (05-24 @ 06:56)  Platelet Count - Automated: 317 K/uL (05-23 @ 06:44)  Platelet Count - Automated: 332 K/uL (05-22 @ 06:23)  Platelet Count - Automated: 423 K/uL (05-22 @ 01:22)  Platelet Count - Automated: 331 K/uL (05-21 @ 22:30)  Platelet Count - Automated: 383 K/uL (05-21 @ 20:28)      LIVER FUNCTIONS - ( 24 May 2022 06:56 )  Alb: 1.8 g/dL / Pro: 5.7 g/dL / ALK PHOS: 124 U/L / ALT: 105 U/L DA / AST: 47 U/L / GGT: x                 RADIOLOGY & ADDITIONAL STUDIES:      MICROBIOLOGY:  RECENT CULTURES:  05-23 Trach Asp Tracheal Aspirate XXXX   Moderate polymorphonuclear leukocytes per low power field  No Squamous epithelial cells per low power field  Moderate Gram Negative Rods per oil power field XXXX    05-21 .Blood Blood-Peripheral XXXX XXXX   No growth to date.    05-21 Clean Catch Clean Catch (Midstream) XXXX XXXX   <10,000 CFU/mL Normal Urogenital Elvira

## 2022-05-24 NOTE — PROGRESS NOTE ADULT - SUBJECTIVE AND OBJECTIVE BOX
Patient is a 76y old  Female who presents with a chief complaint of Acute respiratory distress. (23 May 2022 11:14)      BRIEF HOSPITAL COURSE: 77 yo female with PMHx chronic respiratory failure s/p trach and peg, HTN, T2DM, CVA, GERD, and dementia admitted with acute on chronic respiratory failure, shock, UTI, PNA, b/l pleural effusions, RYAN.      Events last 24 hours: No acute events overnight. Hemodynamics remain stable off vasopressors. RUE midline removed without incident as per Intensivist's orders.        PAST MEDICAL & SURGICAL HISTORY:  Dementia of frontal lobe type      Aphasic stroke      Diabetes mellitus      Respiratory failure      Hypertension      GERD (gastroesophageal reflux disease)      Constipation      Respiratory failure      CVA (cerebral vascular accident)      HTN (hypertension)      DM (diabetes mellitus)      Advanced dementia      COVID-19 virus detected      Quadriplegia      Pneumonia      Type II diabetes mellitus      Hx of appendectomy      Gastrostomy in place      Tracheostomy in place      Tracheostomy tube present      Feeding by G-tube          SOCIAL HISTORY: Due to altered mental status/tracheostomy, unable to obtain.        Review of Systems: Due to altered mental status/tracheostomy, subjective information was not able to be obtained from the patient. History was obtained, to the extent possible, from review of the chart and collateral sources of information.        Medications:  ceftazidime/avibactam IVPB 1.25 Gram(s) IV Intermittent every 12 hours  linezolid  IVPB 600 milliGRAM(s) IV Intermittent every 12 hours      albuterol/ipratropium for Nebulization 3 milliLiter(s) Nebulizer every 6 hours PRN    acetaminophen    Suspension .. 650 milliGRAM(s) Oral every 6 hours PRN  LORazepam     Tablet 1 milliGRAM(s) Oral every 4 hours  LORazepam   Injectable 1 milliGRAM(s) IV Push every 6 hours PRN      heparin   Injectable 5000 Unit(s) SubCutaneous every 8 hours    pantoprazole  Injectable 40 milliGRAM(s) IV Push daily      dextrose 50% Injectable 25 Gram(s) IV Push once  dextrose 50% Injectable 12.5 Gram(s) IV Push once  dextrose 50% Injectable 25 Gram(s) IV Push once  dextrose Oral Gel 15 Gram(s) Oral once PRN  glucagon  Injectable 1 milliGRAM(s) IntraMuscular once  insulin glargine Injectable (LANTUS) 5 Unit(s) SubCutaneous every morning  insulin lispro (ADMELOG) corrective regimen sliding scale   SubCutaneous every 6 hours    dextrose 5%. 1000 milliLiter(s) IV Continuous <Continuous>  dextrose 5%. 1000 milliLiter(s) IV Continuous <Continuous>  sodium chloride 0.9% lock flush 10 milliLiter(s) IV Push every 1 hour PRN      chlorhexidine 0.12% Liquid 15 milliLiter(s) Oral Mucosa every 12 hours  chlorhexidine 2% Cloths 1 Application(s) Topical <User Schedule>  chlorhexidine 4% Liquid 1 Application(s) Topical <User Schedule>  mineral oil/petrolatum Hydrophilic Ointment 1 Application(s) Topical daily  nystatin Powder 1 Application(s) Topical three times a day    lactobacillus acidophilus 1 Tablet(s) Oral daily      Mode: AC/ CMV (Assist Control/ Continuous Mandatory Ventilation)  RR (machine): 26  TV (machine): 400  FiO2: 45  PEEP: 5  ITime: 1  MAP: 18  PIP: 38      ICU Vital Signs Last 24 Hrs  T(C): 36.9 (24 May 2022 03:57), Max: 38.1 (23 May 2022 08:55)  T(F): 98.4 (24 May 2022 03:57), Max: 100.6 (23 May 2022 08:55)  HR: 69 (24 May 2022 05:00) (67 - 123)  BP: 99/49 (24 May 2022 05:00) (90/56 - 117/64)  BP(mean): 65 (24 May 2022 05:00) (64 - 81)  ABP: 128/55 (23 May 2022 08:00) (128/55 - 128/55)  ABP(mean): 80 (23 May 2022 08:00) (80 - 80)  RR: 26 (24 May 2022 05:00) (23 - 28)  SpO2: 100% (24 May 2022 05:00) (84% - 100%)      ABG - ( 23 May 2022 05:38 )  pH, Arterial: 7.55  pH, Blood: x     /  pCO2: 34    /  pO2: 81    / HCO3: 30    / Base Excess: 7.3   /  SaO2: 97.8                I&O's Detail    22 May 2022 07:01  -  23 May 2022 07:00  --------------------------------------------------------  IN:    FentaNYL: 4 mL    IV PiggyBack: 850 mL    Jevity 1.5: 260 mL    Norepinephrine: 19 mL    Propofol: 32 mL  Total IN: 1165 mL    OUT:    Indwelling Catheter - Urethral (mL): 1700 mL  Total OUT: 1700 mL    Total NET: -535 mL      23 May 2022 07:01  -  24 May 2022 06:05  --------------------------------------------------------  IN:    IV PiggyBack: 400 mL    Jevity 1.5: 220 mL  Total IN: 620 mL    OUT:  Total OUT: 0 mL    Total NET: 620 mL            LABS:                        8.2    x     )-----------( x        ( 23 May 2022 20:09 )             x        05-23    143  |  105  |  47<H>  ----------------------------<  205<H>  3.2<L>   |  29  |  1.48<H>    Ca    8.0<L>      23 May 2022 06:44  Phos  2.1     05-23  Mg     3.3     05-22    TPro  5.4<L>  /  Alb  1.9<L>  /  TBili  0.4  /  DBili  x   /  AST  104<H>  /  ALT  149<H>  /  AlkPhos  136<H>  05-23          CAPILLARY BLOOD GLUCOSE      POCT Blood Glucose.: 212 mg/dL (24 May 2022 05:04)    PT/INR - ( 23 May 2022 06:44 )   PT: 15.3 sec;   INR: 1.29 ratio             CULTURES:  C Diff by PCR Result: NotDetec (05-23 @ 12:22)  Culture Results:   No growth to date. (05-21 @ 21:57)  Culture Results:   No growth to date. (05-21 @ 21:57)  Rapid RVP Result: NotDetec (05-21 @ 20:51)  Culture Results:   <10,000 CFU/mL Normal Urogenital Elvira (05-21 @ 20:51)            Physical Examination:    General: No acute distress.      HEENT: Eyes closed    PULM: Trach to vent    CVS: Regular rate and rhythm, no murmurs, rubs, or gallops    ABD: Soft, nondistended, nontender, +PEG    EXT: B/L UE contractures, muscle wasting    SKIN: Warm and well perfused, no rashes noted.    NEURO: Eyes closed, grimmaces to tactile stimuli          INVASIVE LINES: R IJ TLC  INDWELLING REID:   VTE PROPHYLAXIS: Heparin SC   CAM ICU: +  CODE STATUS: FULL

## 2022-05-24 NOTE — PROGRESS NOTE ADULT - SUBJECTIVE AND OBJECTIVE BOX
Subjective: Patient seen and examined. Had blood transfusion yesterday.  BP improved.     MEDICATIONS  (STANDING):  ceftazidime/avibactam IVPB 1.25 Gram(s) IV Intermittent every 12 hours  chlorhexidine 0.12% Liquid 15 milliLiter(s) Oral Mucosa every 12 hours  chlorhexidine 2% Cloths 1 Application(s) Topical <User Schedule>  chlorhexidine 4% Liquid 1 Application(s) Topical <User Schedule>  dextrose 5%. 1000 milliLiter(s) (50 mL/Hr) IV Continuous <Continuous>  dextrose 5%. 1000 milliLiter(s) (100 mL/Hr) IV Continuous <Continuous>  dextrose 50% Injectable 25 Gram(s) IV Push once  dextrose 50% Injectable 12.5 Gram(s) IV Push once  dextrose 50% Injectable 25 Gram(s) IV Push once  glucagon  Injectable 1 milliGRAM(s) IntraMuscular once  heparin   Injectable 5000 Unit(s) SubCutaneous every 8 hours  insulin glargine Injectable (LANTUS) 5 Unit(s) SubCutaneous every morning  insulin lispro (ADMELOG) corrective regimen sliding scale   SubCutaneous every 6 hours  lactobacillus acidophilus 1 Tablet(s) Oral daily  linezolid  IVPB 600 milliGRAM(s) IV Intermittent every 12 hours  LORazepam     Tablet 1 milliGRAM(s) Oral every 4 hours  mineral oil/petrolatum Hydrophilic Ointment 1 Application(s) Topical daily  nystatin Powder 1 Application(s) Topical three times a day  pantoprazole  Injectable 40 milliGRAM(s) IV Push daily    MEDICATIONS  (PRN):  acetaminophen    Suspension .. 650 milliGRAM(s) Oral every 6 hours PRN Temp greater or equal to 38C (100.4F), Mild Pain (1 - 3)  albuterol/ipratropium for Nebulization 3 milliLiter(s) Nebulizer every 6 hours PRN Shortness of Breath and/or Wheezing  dextrose Oral Gel 15 Gram(s) Oral once PRN Blood Glucose LESS THAN 70 milliGRAM(s)/deciliter  LORazepam   Injectable 1 milliGRAM(s) IV Push every 6 hours PRN agitation/vent dyssynchrony  sodium chloride 0.9% lock flush 10 milliLiter(s) IV Push every 1 hour PRN Pre/post blood products, medications, blood draw, and to maintain line patency      Allergies    codeine (Hives)    Intolerances        Vital Signs Last 24 Hrs  T(C): 36.9 (24 May 2022 03:57), Max: 37.4 (23 May 2022 10:00)  T(F): 98.4 (24 May 2022 03:57), Max: 99.4 (23 May 2022 10:00)  HR: 73 (24 May 2022 09:00) (67 - 120)  BP: 97/51 (24 May 2022 09:00) (90/56 - 117/64)  BP(mean): 65 (24 May 2022 09:00) (64 - 81)  RR: 24 (24 May 2022 09:00) (23 - 28)  SpO2: 100% (24 May 2022 09:00) (84% - 100%)    PHYSICAL EXAM:  GENERAL: Laying in bed with eyes open and unresponsive   HEAD:  Atraumatic, Normocephalic  ENMT: Moist mucous membranes,   NECK: Supple, No JVD, Normal thyroid; Trach to Vent   CHEST/LUNG: Clear to auscultation bilaterally; No rales, rhonchi, wheezing, or rubs  HEART: Regular rate and rhythm; No murmurs, rubs, or gallops  ABDOMEN: Distended and PEG Tube +   EXTREMITIES:  2+ Peripheral Pulses, No clubbing, cyanosis, or edema      LABS:                        8.0    14.23 )-----------( 291      ( 24 May 2022 06:56 )             27.0     24 May 2022 06:56    144    |  106    |  40     ----------------------------<  198    3.6     |  28     |  1.48     Ca    8.0        24 May 2022 06:56    TPro  5.7    /  Alb  1.8    /  TBili  0.6    /  DBili  x      /  AST  47     /  ALT  105    /  AlkPhos  124    24 May 2022 06:56    PT/INR - ( 24 May 2022 06:56 )   PT: 14.2 sec;   INR: 1.20 ratio             CAPILLARY BLOOD GLUCOSE      POCT Blood Glucose.: 212 mg/dL (24 May 2022 05:04)  POCT Blood Glucose.: 121 mg/dL (24 May 2022 00:48)  POCT Blood Glucose.: 165 mg/dL (23 May 2022 23:01)  POCT Blood Glucose.: 161 mg/dL (23 May 2022 18:16)  POCT Blood Glucose.: 263 mg/dL (23 May 2022 13:12)      RADIOLOGY & ADDITIONAL TESTS:    Imaging Personally Reviewed:  [ ] YES     Consultant(s) Notes Reviewed:      Care Discussed with Consultants/Other Providers:    Advanced Directives: [ ] DNR  [ ] No feeding tube  [ ] MOLST in chart  [ ] MOLST completed today  [ ] Unknown

## 2022-05-24 NOTE — CHART NOTE - NSCHARTNOTEFT_GEN_A_CORE
Assessment:     Pt seen for nutrition follow-up. Per H&P, pt is a "78 y/o F with PMH of HTN, DM type 2, Cardiac Arrest, CVA, COPD, Chronic Respiratory Failure Vent Dependant s/p trach & PEG, Multiple Hospital Admissions for Aspiration & vent associated PNA, COVID-19 Infection, Anemia with GI blood loss, GERD, and Advanced Dementia who was sent from [facility] facility for acute respiratory distress with unknown onset, no reported fever or chills. On arrival to ED she was found with tachypnea & dropping of her SPO2 on same vent settings, CT chest without contrast revealed worse B/L PNA & B/L pleural effusion compared with her CT one month ago. Patient was discharged from hospital 3 weeks ago after admission for a similar condition, No history could obtained given her status."    Nutrition consult previously ordered for pressure ulcer stage II or >. Pt admitted with stage II to sacrum, stage III to R buttock, SDTI to R lateral foot and L under foot.  Pt with hx trach/PEG.  Per transfer documents, pt was receiving Glucerna 1.5 to goal 60ml/hr x 20hrs (was providing 1200ml TV formula, 1800kcal, 99g protein; also receiving 250ml free water q 6hrs, +25ml hourly flushes). Tube feed currently active for Jevity 1.5 to goal rate of 20ml/hr x 24 hours per MD order (currently providing 720kcal, 30.6g protein). Pt tolerating TF well. Current diet order not meeting pt's estimated needs. Spoke to pt's RN who reports low tube feed rate 2/2 abdominal distention. Discussed changing tube feeding formula to Glucerna (hx of T2DM, TF regimen PTA). New diet order currently pending. As medically feasible, recommend Glucerna 1.5, start at 20ml/hr increase by 10ml every 6 hours until goal 60ml/hr x 20hrs is reached (to provide 1200 ml TV formula, 1800kcal based on 31kcal/kg IBW, 99g protein based on 1.7g/kg IBW, 912ml free water from formula); recommend standard 25ml/hr free water flushes. CBW on admission 117#. Previous adm weight of 119#. No edema noted. +BM 5/23. RD to follow closely and will continue to monitor pt's nutrition status.    Factors impacting intake: [ ] none [ ] nausea  [ ] vomiting [ ] diarrhea [ ] constipation  [ ]chewing problems [ ] swallowing issues  [X] other: peg, abdominal distention    Diet Prescription: Diet, NPO with Tube Feed:   Tube Feeding Modality: Gastrostomy  Jevity 1.5 Horacio  Total Volume for 24 Hours (mL): 480  Continuous  Starting Tube Feed Rate {mL per Hour}: 20  Until Goal Tube Feed Rate (mL per Hour): 20  Tube Feed Duration (in Hours): 24  Tube Feed Start Time: 18:30 (05-22-22 @ 18:06)    Intake: currently tolerating tube feeds at goal rate (low rate of 20ml/hr)    Current Weight: Weight (kg): 53.5 (05-21 @ 20:03)  % Weight Change    Pertinent Medications: MEDICATIONS  (STANDING):  ceftazidime/avibactam IVPB 1.25 Gram(s) IV Intermittent every 12 hours  chlorhexidine 0.12% Liquid 15 milliLiter(s) Oral Mucosa every 12 hours  chlorhexidine 2% Cloths 1 Application(s) Topical <User Schedule>  chlorhexidine 4% Liquid 1 Application(s) Topical <User Schedule>  dextrose 5%. 1000 milliLiter(s) (50 mL/Hr) IV Continuous <Continuous>  dextrose 5%. 1000 milliLiter(s) (100 mL/Hr) IV Continuous <Continuous>  dextrose 50% Injectable 25 Gram(s) IV Push once  dextrose 50% Injectable 12.5 Gram(s) IV Push once  dextrose 50% Injectable 25 Gram(s) IV Push once  glucagon  Injectable 1 milliGRAM(s) IntraMuscular once  heparin   Injectable 5000 Unit(s) SubCutaneous every 8 hours  insulin glargine Injectable (LANTUS) 5 Unit(s) SubCutaneous every morning  insulin lispro (ADMELOG) corrective regimen sliding scale   SubCutaneous every 6 hours  lactobacillus acidophilus 1 Tablet(s) Oral daily  linezolid  IVPB 600 milliGRAM(s) IV Intermittent every 12 hours  LORazepam     Tablet 1 milliGRAM(s) Oral every 4 hours  mineral oil/petrolatum Hydrophilic Ointment 1 Application(s) Topical daily  nystatin Powder 1 Application(s) Topical three times a day  pantoprazole  Injectable 40 milliGRAM(s) IV Push daily    MEDICATIONS  (PRN):  acetaminophen    Suspension .. 650 milliGRAM(s) Oral every 6 hours PRN Temp greater or equal to 38C (100.4F), Mild Pain (1 - 3)  albuterol/ipratropium for Nebulization 3 milliLiter(s) Nebulizer every 6 hours PRN Shortness of Breath and/or Wheezing  dextrose Oral Gel 15 Gram(s) Oral once PRN Blood Glucose LESS THAN 70 milliGRAM(s)/deciliter  LORazepam   Injectable 1 milliGRAM(s) IV Push every 6 hours PRN agitation/vent dyssynchrony  sodium chloride 0.9% lock flush 10 milliLiter(s) IV Push every 1 hour PRN Pre/post blood products, medications, blood draw, and to maintain line patency    Pertinent Labs: 05-24 Na144 mmol/L Glu 198 mg/dL<H> K+ 3.6 mmol/L Cr  1.48 mg/dL<H> BUN 40 mg/dL<H> 05-23 Phos 2.1 mg/dL<L> 05-24 Alb 1.8 g/dL<L>    CAPILLARY BLOOD GLUCOSE  POCT Blood Glucose.: 169 mg/dL (24 May 2022 11:43)  POCT Blood Glucose.: 212 mg/dL (24 May 2022 05:04)  POCT Blood Glucose.: 121 mg/dL (24 May 2022 00:48)  POCT Blood Glucose.: 165 mg/dL (23 May 2022 23:01)  POCT Blood Glucose.: 161 mg/dL (23 May 2022 18:16)    Skin: pressure ulcers; stage II to sacrum, stage III to R buttock, SDTI to R lateral foot and L under foot    Estimated Needs:   [X] no change since previous assessment  [ ] recalculated:     Previous Nutrition Diagnosis:   [ ] Inadequate Energy Intake [ ]Inadequate Oral Intake [ ] Excessive Energy Intake   [ ] Underweight [X] Increased Nutrient Needs [ ] Overweight/Obesity   [ ] Altered GI Function [ ] Unintended Weight Loss [ ] Food & Nutrition Related Knowledge Deficit [ ] Malnutrition     Nutrition Diagnosis is [X] ongoing  [ ] resolved [ ] not applicable     New Nutrition Diagnosis: [ ] not applicable       Interventions: Consider change of tube feed formula to Glucerna; start at low rate of 20ml/hr; increase to goal rate of 60ml/hr x 20hrs as medically feasible  Recommend  [ ] Change Diet To:  [ ] Nutrition Supplement  [ ] Nutrition Support: Glucerna at 20ml/hr  [ ] Other:     Monitoring and Evaluation:   [ ] PO intake [ x ] Tolerance to EN prescription [ x ] weights [ x ] labs[ x ] follow up per protocol  [ ] other:

## 2022-05-24 NOTE — PROGRESS NOTE ADULT - ASSESSMENT
This is an 78 y/o F with PMH of HTN, DM type 2, Cardiac Arrest, CVA, COPD, Chronic Respiratory Failure Vent Dependant s/p trach & PEG, Multiple Hospital Admissions for Aspiration & vent associated PNA, COVID-19 Infection, Anemia with GI blood loss, GERD, and Advanced Dementia who was sent from [facility] facility for acute respiratory distress.     Sepsis 2/2 VAP/PNA  Acute on Chronic RF, vented   - pt w/ hx of multiple admissions  - pt p/w acute RF, likely recurrent VAP  - prior sputum cx reviewed. 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  Plan:  F/u pending MRSA swab  F/u pending uPNA Ag  F/u final sputum cx  C/w avycaz for now  C/w linezolid, will d/c if MRSA swab negative  C/w vent management per ICU  Pulm toilet  Maintain aspiration precautions  Trend temps/WBC    Infectious Diseases will continue to follow. Please call with any questions.   Andressa Brantley M.D.  [facility], Division of Infectious Diseases [phone]

## 2022-05-24 NOTE — CHART NOTE - NSCHARTNOTEFT_GEN_A_CORE
RUE midline present on admission  Removed swiftly without resistance   Manual pressure held until hemostasis achieved   Dressing placed

## 2022-05-24 NOTE — PROGRESS NOTE ADULT - SUBJECTIVE AND OBJECTIVE BOX
Chief Complaint: Respiratory distress    Interval Events: No events overnight.    Review of Systems:  Unable to obtain    Physical Exam:  Vitals:        Vital Signs Last 24 Hrs  T(C): 36.9 (24 May 2022 03:57), Max: 37.4 (23 May 2022 10:00)  T(F): 98.4 (24 May 2022 03:57), Max: 99.4 (23 May 2022 10:00)  HR: 73 (24 May 2022 09:00) (67 - 120)  BP: 97/51 (24 May 2022 09:00) (90/56 - 117/64)  BP(mean): 65 (24 May 2022 09:00) (64 - 81)  RR: 24 (24 May 2022 09:00) (23 - 28)  SpO2: 100% (24 May 2022 09:00) (84% - 100%)  General: NAD  HEENT: Trach  Neck: No JVD, no carotid bruit  Lungs: Coarse bilaterally  CV: RRR, nl S1/S2, no M/R/G  Abdomen: S/NT/ND, +BS  Extremities: No LE edema, no cyanosis  Neuro: AAOx0  Skin: No rash    Labs:                        8.0    14.23 )-----------( 291      ( 24 May 2022 06:56 )             27.0     05-24    144  |  106  |  40<H>  ----------------------------<  198<H>  3.6   |  28  |  1.48<H>    Ca    8.0<L>      24 May 2022 06:56  Phos  2.1     05-23    TPro  5.7<L>  /  Alb  1.8<L>  /  TBili  0.6  /  DBili  x   /  AST  47<H>  /  ALT  105<H>  /  AlkPhos  124<H>  05-24        PT/INR - ( 24 May 2022 06:56 )   PT: 14.2 sec;   INR: 1.20 ratio             Telemetry: Sinus rhythm

## 2022-05-25 ENCOUNTER — RESULT REVIEW (OUTPATIENT)
Age: 77
End: 2022-05-25

## 2022-05-25 DIAGNOSIS — L89.95 PRESSURE ULCER OF UNSPECIFIED SITE, UNSTAGEABLE: ICD-10-CM

## 2022-05-25 LAB
-  AMIKACIN: SIGNIFICANT CHANGE UP
-  AZTREONAM: SIGNIFICANT CHANGE UP
-  CEFEPIME: SIGNIFICANT CHANGE UP
-  CEFTAZIDIME: SIGNIFICANT CHANGE UP
-  CIPROFLOXACIN: SIGNIFICANT CHANGE UP
-  GENTAMICIN: SIGNIFICANT CHANGE UP
-  IMIPENEM: SIGNIFICANT CHANGE UP
-  LEVOFLOXACIN: SIGNIFICANT CHANGE UP
-  MEROPENEM: SIGNIFICANT CHANGE UP
-  PIPERACILLIN/TAZOBACTAM: SIGNIFICANT CHANGE UP
-  TOBRAMYCIN: SIGNIFICANT CHANGE UP
ALBUMIN FLD-MCNC: 1.7 G/DL — SIGNIFICANT CHANGE UP
ALBUMIN SERPL ELPH-MCNC: 1.8 G/DL — LOW (ref 3.3–5)
ALP SERPL-CCNC: 131 U/L — HIGH (ref 30–120)
ALT FLD-CCNC: 87 U/L DA — HIGH (ref 10–60)
ANION GAP SERPL CALC-SCNC: 9 MMOL/L — SIGNIFICANT CHANGE UP (ref 5–17)
AST SERPL-CCNC: 36 U/L — SIGNIFICANT CHANGE UP (ref 10–40)
BILIRUB SERPL-MCNC: 0.6 MG/DL — SIGNIFICANT CHANGE UP (ref 0.2–1.2)
BUN SERPL-MCNC: 38 MG/DL — HIGH (ref 7–23)
CALCIUM SERPL-MCNC: 8.1 MG/DL — LOW (ref 8.4–10.5)
CHLORIDE SERPL-SCNC: 106 MMOL/L — SIGNIFICANT CHANGE UP (ref 96–108)
CO2 SERPL-SCNC: 28 MMOL/L — SIGNIFICANT CHANGE UP (ref 22–31)
CREAT SERPL-MCNC: 1.41 MG/DL — HIGH (ref 0.5–1.3)
CULTURE RESULTS: SIGNIFICANT CHANGE UP
EGFR: 38 ML/MIN/1.73M2 — LOW
GLUCOSE FLD-MCNC: 183 MG/DL — SIGNIFICANT CHANGE UP
GLUCOSE SERPL-MCNC: 204 MG/DL — HIGH (ref 70–99)
GRAM STN FLD: SIGNIFICANT CHANGE UP
HCT VFR BLD CALC: 26.1 % — LOW (ref 34.5–45)
HGB BLD-MCNC: 7.8 G/DL — LOW (ref 11.5–15.5)
LDH SERPL L TO P-CCNC: 144 U/L — SIGNIFICANT CHANGE UP
MAGNESIUM SERPL-MCNC: 3.1 MG/DL — HIGH (ref 1.6–2.6)
MCHC RBC-ENTMCNC: 25.4 PG — LOW (ref 27–34)
MCHC RBC-ENTMCNC: 29.9 GM/DL — LOW (ref 32–36)
MCV RBC AUTO: 85 FL — SIGNIFICANT CHANGE UP (ref 80–100)
METHOD TYPE: SIGNIFICANT CHANGE UP
NIGHT BLUE STAIN TISS: SIGNIFICANT CHANGE UP
NRBC # BLD: 0 /100 WBCS — SIGNIFICANT CHANGE UP (ref 0–0)
ORGANISM # SPEC MICROSCOPIC CNT: SIGNIFICANT CHANGE UP
ORGANISM # SPEC MICROSCOPIC CNT: SIGNIFICANT CHANGE UP
PH FLD: 7.8 — SIGNIFICANT CHANGE UP
PHOSPHATE SERPL-MCNC: 4.1 MG/DL — SIGNIFICANT CHANGE UP (ref 2.5–4.5)
PLATELET # BLD AUTO: 255 K/UL — SIGNIFICANT CHANGE UP (ref 150–400)
POTASSIUM SERPL-MCNC: 3.6 MMOL/L — SIGNIFICANT CHANGE UP (ref 3.5–5.3)
POTASSIUM SERPL-SCNC: 3.6 MMOL/L — SIGNIFICANT CHANGE UP (ref 3.5–5.3)
PROT FLD-MCNC: 3.4 G/DL — SIGNIFICANT CHANGE UP
PROT SERPL-MCNC: 5.7 G/DL — LOW (ref 6–8.3)
RBC # BLD: 3.07 M/UL — LOW (ref 3.8–5.2)
RBC # FLD: 20.9 % — HIGH (ref 10.3–14.5)
SODIUM SERPL-SCNC: 143 MMOL/L — SIGNIFICANT CHANGE UP (ref 135–145)
SPECIMEN SOURCE: SIGNIFICANT CHANGE UP
WBC # BLD: 11.83 K/UL — HIGH (ref 3.8–10.5)
WBC # FLD AUTO: 11.83 K/UL — HIGH (ref 3.8–10.5)

## 2022-05-25 PROCEDURE — 71045 X-RAY EXAM CHEST 1 VIEW: CPT | Mod: 26

## 2022-05-25 PROCEDURE — 99222 1ST HOSP IP/OBS MODERATE 55: CPT

## 2022-05-25 PROCEDURE — 88108 CYTOPATH CONCENTRATE TECH: CPT | Mod: 26

## 2022-05-25 PROCEDURE — 88305 TISSUE EXAM BY PATHOLOGIST: CPT | Mod: 26

## 2022-05-25 PROCEDURE — 99233 SBSQ HOSP IP/OBS HIGH 50: CPT

## 2022-05-25 PROCEDURE — 32555 ASPIRATE PLEURA W/ IMAGING: CPT | Mod: LT

## 2022-05-25 PROCEDURE — 71250 CT THORAX DX C-: CPT | Mod: 26

## 2022-05-25 RX ORDER — COLLAGENASE CLOSTRIDIUM HIST. 250 UNIT/G
1 OINTMENT (GRAM) TOPICAL DAILY
Refills: 0 | Status: DISCONTINUED | OUTPATIENT
Start: 2022-05-25 | End: 2022-06-01

## 2022-05-25 RX ORDER — POVIDONE-IODINE 5 %
1 AEROSOL (ML) TOPICAL DAILY
Refills: 0 | Status: DISCONTINUED | OUTPATIENT
Start: 2022-05-25 | End: 2022-06-01

## 2022-05-25 RX ORDER — IRON SUCROSE 20 MG/ML
200 INJECTION, SOLUTION INTRAVENOUS EVERY 24 HOURS
Refills: 0 | Status: COMPLETED | OUTPATIENT
Start: 2022-05-25 | End: 2022-05-27

## 2022-05-25 RX ADMIN — Medication 1 MILLIGRAM(S): at 16:23

## 2022-05-25 RX ADMIN — CHLORHEXIDINE GLUCONATE 15 MILLILITER(S): 213 SOLUTION TOPICAL at 05:41

## 2022-05-25 RX ADMIN — Medication 1 MILLIGRAM(S): at 05:42

## 2022-05-25 RX ADMIN — Medication 1 TABLET(S): at 11:42

## 2022-05-25 RX ADMIN — CHLORHEXIDINE GLUCONATE 1 APPLICATION(S): 213 SOLUTION TOPICAL at 05:42

## 2022-05-25 RX ADMIN — CHLORHEXIDINE GLUCONATE 1 APPLICATION(S): 213 SOLUTION TOPICAL at 06:38

## 2022-05-25 RX ADMIN — Medication 1 MILLIGRAM(S): at 09:44

## 2022-05-25 RX ADMIN — IRON SUCROSE 110 MILLIGRAM(S): 20 INJECTION, SOLUTION INTRAVENOUS at 10:41

## 2022-05-25 RX ADMIN — HEPARIN SODIUM 5000 UNIT(S): 5000 INJECTION INTRAVENOUS; SUBCUTANEOUS at 13:18

## 2022-05-25 RX ADMIN — Medication 1 APPLICATION(S): at 17:05

## 2022-05-25 RX ADMIN — Medication 1 APPLICATION(S): at 17:06

## 2022-05-25 RX ADMIN — Medication 1 MILLIGRAM(S): at 17:07

## 2022-05-25 RX ADMIN — PANTOPRAZOLE SODIUM 40 MILLIGRAM(S): 20 TABLET, DELAYED RELEASE ORAL at 11:43

## 2022-05-25 RX ADMIN — Medication 1 APPLICATION(S): at 12:25

## 2022-05-25 RX ADMIN — CHLORHEXIDINE GLUCONATE 15 MILLILITER(S): 213 SOLUTION TOPICAL at 17:05

## 2022-05-25 RX ADMIN — Medication 1 MILLIGRAM(S): at 21:51

## 2022-05-25 RX ADMIN — Medication 2: at 11:47

## 2022-05-25 RX ADMIN — Medication 2: at 05:43

## 2022-05-25 RX ADMIN — HEPARIN SODIUM 5000 UNIT(S): 5000 INJECTION INTRAVENOUS; SUBCUTANEOUS at 21:50

## 2022-05-25 RX ADMIN — NYSTATIN CREAM 1 APPLICATION(S): 100000 CREAM TOPICAL at 05:42

## 2022-05-25 RX ADMIN — INSULIN GLARGINE 5 UNIT(S): 100 INJECTION, SOLUTION SUBCUTANEOUS at 07:55

## 2022-05-25 RX ADMIN — LINEZOLID 300 MILLIGRAM(S): 600 INJECTION, SOLUTION INTRAVENOUS at 17:05

## 2022-05-25 RX ADMIN — NYSTATIN CREAM 1 APPLICATION(S): 100000 CREAM TOPICAL at 13:26

## 2022-05-25 RX ADMIN — Medication 1 MILLIGRAM(S): at 13:18

## 2022-05-25 RX ADMIN — Medication 1 MILLIGRAM(S): at 10:41

## 2022-05-25 RX ADMIN — Medication 1 MILLIGRAM(S): at 01:02

## 2022-05-25 RX ADMIN — LINEZOLID 300 MILLIGRAM(S): 600 INJECTION, SOLUTION INTRAVENOUS at 05:41

## 2022-05-25 RX ADMIN — NYSTATIN CREAM 1 APPLICATION(S): 100000 CREAM TOPICAL at 21:51

## 2022-05-25 NOTE — PROGRESS NOTE ADULT - SUBJECTIVE AND OBJECTIVE BOX
Chief Complaint: Respiratory distress    Interval Events: No events overnight.    Review of Systems:  Unable to obtain    Physical Exam:  Vital Signs Last 24 Hrs  T(C): 36.9 (25 May 2022 03:44), Max: 37.2 (24 May 2022 15:47)  T(F): 98.4 (25 May 2022 03:44), Max: 98.9 (24 May 2022 15:47)  HR: 68 (25 May 2022 08:00) (68 - 97)  BP: 113/57 (25 May 2022 08:00) (82/43 - 138/63)  BP(mean): 74 (25 May 2022 08:00) (56 - 87)  RR: 23 (25 May 2022 08:00) (21 - 32)  SpO2: 99% (25 May 2022 08:00) (92% - 100%)  General: NAD  HEENT: Trach  Neck: No JVD, no carotid bruit  Lungs: Coarse bilaterally  CV: RRR, nl S1/S2, no M/R/G  Abdomen: S/NT/ND, +BS  Extremities: No LE edema, no cyanosis  Neuro: AAOx0  Skin: No rash    Labs:             05-25    143  |  106  |  38<H>  ----------------------------<  204<H>  3.6   |  28  |  1.41<H>    Ca    8.1<L>      25 May 2022 06:00  Phos  4.1     05-25  Mg     3.1     05-25    TPro  5.7<L>  /  Alb  1.8<L>  /  TBili  0.6  /  DBili  x   /  AST  36  /  ALT  87<H>  /  AlkPhos  131<H>  05-25                        7.8    11.83 )-----------( 255      ( 25 May 2022 06:00 )             26.1       Telemetry: Sinus rhythm

## 2022-05-25 NOTE — PROGRESS NOTE ADULT - SUBJECTIVE AND OBJECTIVE BOX
[facility], Division of Infectious Diseases  MOIZ Lora Y. Patel, S. Shah, G. [facility]  [phone]    Name: BREA BECKHAM  Age: 76y  Gender: Female  MRN: [id number]    Interval History:  Patient seen and examined at bedside  S/p IR guided thoracentesis this AM  Afebrile  Notes reviewed    Antibiotics:  ceftazidime/avibactam IVPB 1.25 Gram(s) IV Intermittent every 12 hours  linezolid  IVPB 600 milliGRAM(s) IV Intermittent every 12 hours      Medications:  acetaminophen    Suspension .. 650 milliGRAM(s) Oral every 6 hours PRN  albuterol/ipratropium for Nebulization 3 milliLiter(s) Nebulizer every 6 hours PRN  ceftazidime/avibactam IVPB 1.25 Gram(s) IV Intermittent every 12 hours  chlorhexidine 0.12% Liquid 15 milliLiter(s) Oral Mucosa every 12 hours  chlorhexidine 2% Cloths 1 Application(s) Topical <User Schedule>  chlorhexidine 4% Liquid 1 Application(s) Topical <User Schedule>  dextrose 5%. 1000 milliLiter(s) IV Continuous <Continuous>  dextrose 5%. 1000 milliLiter(s) IV Continuous <Continuous>  dextrose 50% Injectable 25 Gram(s) IV Push once  dextrose 50% Injectable 12.5 Gram(s) IV Push once  dextrose 50% Injectable 25 Gram(s) IV Push once  dextrose Oral Gel 15 Gram(s) Oral once PRN  glucagon  Injectable 1 milliGRAM(s) IntraMuscular once  heparin   Injectable 5000 Unit(s) SubCutaneous every 8 hours  insulin glargine Injectable (LANTUS) 5 Unit(s) SubCutaneous every morning  insulin lispro (ADMELOG) corrective regimen sliding scale   SubCutaneous every 6 hours  iron sucrose IVPB 200 milliGRAM(s) IV Intermittent every 24 hours  lactobacillus acidophilus 1 Tablet(s) Oral daily  linezolid  IVPB 600 milliGRAM(s) IV Intermittent every 12 hours  LORazepam     Tablet 1 milliGRAM(s) Oral every 4 hours  LORazepam   Injectable 1 milliGRAM(s) IV Push every 6 hours PRN  mineral oil/petrolatum Hydrophilic Ointment 1 Application(s) Topical daily  nystatin Powder 1 Application(s) Topical three times a day  pantoprazole  Injectable 40 milliGRAM(s) IV Push daily  sodium chloride 0.9% lock flush 10 milliLiter(s) IV Push every 1 hour PRN      Review of Systems:  unable to obtain  Allergies: codeine (Hives)    For details regarding the patient's past medical history, social history, family history, and other miscellaneous elements, please refer the initial infectious diseases consultation and/or the admitting history and physical examination for this admission.    Objective:  Vitals:   T(C): 36.8 (05-25-22 @ 12:31), Max: 37.2 (05-24-22 @ 15:47)  HR: 67 (05-25-22 @ 13:00) (60 - 100)  BP: 116/58 (05-25-22 @ 13:00) (90/54 - 138/63)  RR: 26 (05-25-22 @ 13:00) (21 - 33)  SpO2: 100% (05-25-22 @ 13:00) (92% - 100%)    Mode: AC/ CMV (Assist Control/ Continuous Mandatory Ventilation)  RR (machine): 26  TV (machine): 400  FiO2: 50  PEEP: 5  ITime: 0.9  MAP: 12  PIP: 34      Physical Examination:  General: chronically ill appearing W  HEENT: NC/AT  Neck: trach to vent  Cardio: S1, S2 heard, RRR, no murmurs  Resp: MV breath sounds  Abd: soft, NT, ND, PGT in place  Neuro: nonverbal  Ext: no edema or cyanosis  Skin: warm, dry    Laboratory Studies:  CBC:                       7.8    11.83 )-----------( 255      ( 25 May 2022 06:00 )             26.1     CMP: 05-25    143  |  106  |  38<H>  ----------------------------<  204<H>  3.6   |  28  |  1.41<H>    Ca    8.1<L>      25 May 2022 06:00  Phos  4.1     05-25  Mg     3.1     05-25    TPro  5.7<L>  /  Alb  1.8<L>  /  TBili  0.6  /  DBili  x   /  AST  36  /  ALT  87<H>  /  AlkPhos  131<H>  05-25    LIVER FUNCTIONS - ( 25 May 2022 06:00 )  Alb: 1.8 g/dL / Pro: 5.7 g/dL / ALK PHOS: 131 U/L / ALT: 87 U/L DA / AST: 36 U/L / GGT: x               Microbiology: reviewed    Culture - Sputum (collected 05-23-22 @ 11:20)  Source: Trach Asp Tracheal Aspirate  Gram Stain (05-23-22 @ 21:29):    Moderate polymorphonuclear leukocytes per low power field    No Squamous epithelial cells per low power field    Moderate Gram Negative Rods per oil power field  Preliminary Report (05-24-22 @ 16:43):    Numerous Mixed gram negative rods including    Numerous Pseudomonas aeruginosa    Culture - Blood (collected 05-21-22 @ 21:57)  Source: .Blood Blood-Peripheral  Preliminary Report (05-23-22 @ 14:01):    No growth to date.    Culture - Blood (collected 05-21-22 @ 21:57)  Source: .Blood Blood-Peripheral  Preliminary Report (05-23-22 @ 14:01):    No growth to date.    Culture - Urine (collected 05-21-22 @ 20:51)  Source: Clean Catch Clean Catch (Midstream)  Final Report (05-23-22 @ 10:12):    <10,000 CFU/mL Normal Urogenital Elvira        Radiology: reviewed

## 2022-05-25 NOTE — CONSULT NOTE ADULT - CONSULT REASON
Wound consult
pneumonia
Acute hypoxic resp failure
chr resp failure
Acute on chronic respiratory failure, elevated cardiac enzymes
dysp

## 2022-05-25 NOTE — CONSULT NOTE ADULT - CONSULT REQUESTED DATE/TIME
21-May-2022 21:39
21-May-2022
22-May-2022 13:22
23-May-2022 08:59
23-May-2022 07:37
25-May-2022 14:53

## 2022-05-25 NOTE — PROGRESS NOTE ADULT - ASSESSMENT
79F HTN, DM2, COPD, Chronic Respiratory Failure on Trach to Vent admitted for Acute on Chronic Respiratory Failure.     Acute on Chronic Respiratory Failure / Septic Shock   Aspiration vs. VAP vs ; History of CRE and Psuedomonas; Continues to have low grade temps  CT imaging shows B/L Pleural Effusion and could be parapneumonic effusion; IR guided thoracentesis later today  IV Ceftazidime + Linezolid + Levophed   Follow up all cultures  ID and Pulmonary consults appreciated     Anemia of Chronic Disease with Iron Deficiency  Has been evaluated by GI and Hematology on prior admissions  She has Anemia of Chronic Disease but could also have intermittent GI Bleeding; Occult Blood Negative  S/P 1U PRBC Transfusion; Will give IV Venofer x 3 days    Acute Renal Failure   Most likely due to Sepsis   Will continue gentle hydration and maintain BP   Renally dose and monitor BMP and electrolytes     HTN  Hold all BP lower agents    DM2  FS and on ISS to maintain BS <180    Diet  Tube Feeds    DVT Prophylaxis  Heparin on hold for procedure    Disposition  Full Code   Discharge planning pending hospital course

## 2022-05-25 NOTE — CONSULT NOTE ADULT - ASSESSMENT
Patient presents with   1. Gluteal fold unstageable pressure injury 3 x 2 yellow slough 100% scant/moderate serosanguinous drainage no odor, periwound erythema  recommendation:   -Santyl daily  2. Sacral area scr tissue likley resolved stage 3/4 pressure injury  -as per prevention protocol  3. Left hallux unstageable pressure injury 1.5 x 1.5 dry, periwound dry mild erythema  recommendation:   -Continue Betadine daily  4. Right hallux unstageable pressure injury 1.5 x 1.5 dry, periwound dry mild erythema  recommendation:   -Continue Betadine daily  5. Right lateral foot unstageable pressure injury 1 x 1 dry, periwound dry mild erythema  recommendation:  -Continue Betadine daily  6. Right lateral/plantar foot unstageable pressure injury 1 x 1 dry, periwound dry mild erythema  recommendation:  -Continue Betadine daily  Patient is on a low air loss mattress  for the critically ill patient experiencing multiorgan failure, impaired tissue oxygenation, and perfusion can contribute to skin failure thus the development of a pressure related injury may be unavoidable    This pressure injury is community acquired/YES  At risk for altered tissue perfusion /YES  Impaired perfusion of peripheral tissue /YES  Continue  Nutrition (as tolerated)  Continue  Offloading   Continue Pericare  Apply cair boots at all times while in bed.   Provide skin checks and foot placement q8h.  Care as per medicine will follow w/ you  Follow up as outpatient at [facility]   Findings and recommendations discussed with BRANDEN Jolley  Thank you for this consult  Ana Luisa Cantu NP, [facility] [phone]

## 2022-05-25 NOTE — PROGRESS NOTE ADULT - SUBJECTIVE AND OBJECTIVE BOX
Date/Time Patient Seen:  		  Referring MD:   Data Reviewed	       Patient is a 76y old  Female who presents with a chief complaint of Acute respiratory distress. (25 May 2022 06:17)      Subjective/HPI     PAST MEDICAL & SURGICAL HISTORY:  Dementia of frontal lobe type    Aphasic stroke    Diabetes mellitus    Respiratory failure    Hypertension    GERD (gastroesophageal reflux disease)    Constipation    Respiratory failure    CVA (cerebral vascular accident)    HTN (hypertension)    DM (diabetes mellitus)    Advanced dementia    COVID-19 virus detected    Quadriplegia    Pneumonia    Type II diabetes mellitus    Hx of appendectomy    Gastrostomy in place    Tracheostomy in place    Tracheostomy tube present    Feeding by G-tube          Medication list         MEDICATIONS  (STANDING):  ceftazidime/avibactam IVPB 1.25 Gram(s) IV Intermittent every 12 hours  chlorhexidine 0.12% Liquid 15 milliLiter(s) Oral Mucosa every 12 hours  chlorhexidine 2% Cloths 1 Application(s) Topical <User Schedule>  chlorhexidine 4% Liquid 1 Application(s) Topical <User Schedule>  dextrose 5%. 1000 milliLiter(s) (50 mL/Hr) IV Continuous <Continuous>  dextrose 5%. 1000 milliLiter(s) (100 mL/Hr) IV Continuous <Continuous>  dextrose 50% Injectable 25 Gram(s) IV Push once  dextrose 50% Injectable 12.5 Gram(s) IV Push once  dextrose 50% Injectable 25 Gram(s) IV Push once  glucagon  Injectable 1 milliGRAM(s) IntraMuscular once  heparin   Injectable 5000 Unit(s) SubCutaneous every 8 hours  insulin glargine Injectable (LANTUS) 5 Unit(s) SubCutaneous every morning  insulin lispro (ADMELOG) corrective regimen sliding scale   SubCutaneous every 6 hours  lactobacillus acidophilus 1 Tablet(s) Oral daily  linezolid  IVPB 600 milliGRAM(s) IV Intermittent every 12 hours  LORazepam     Tablet 1 milliGRAM(s) Oral every 4 hours  mineral oil/petrolatum Hydrophilic Ointment 1 Application(s) Topical daily  nystatin Powder 1 Application(s) Topical three times a day  pantoprazole  Injectable 40 milliGRAM(s) IV Push daily    MEDICATIONS  (PRN):  acetaminophen    Suspension .. 650 milliGRAM(s) Oral every 6 hours PRN Temp greater or equal to 38C (100.4F), Mild Pain (1 - 3)  albuterol/ipratropium for Nebulization 3 milliLiter(s) Nebulizer every 6 hours PRN Shortness of Breath and/or Wheezing  dextrose Oral Gel 15 Gram(s) Oral once PRN Blood Glucose LESS THAN 70 milliGRAM(s)/deciliter  LORazepam   Injectable 1 milliGRAM(s) IV Push every 6 hours PRN agitation/vent dyssynchrony  sodium chloride 0.9% lock flush 10 milliLiter(s) IV Push every 1 hour PRN Pre/post blood products, medications, blood draw, and to maintain line patency         Vitals log        ICU Vital Signs Last 24 Hrs  T(C): 36.9 (25 May 2022 03:44), Max: 37.4 (24 May 2022 08:30)  T(F): 98.4 (25 May 2022 03:44), Max: 99.4 (24 May 2022 08:30)  HR: 70 (25 May 2022 05:25) (70 - 97)  BP: 103/60 (25 May 2022 04:00) (82/43 - 129/68)  BP(mean): 73 (25 May 2022 04:00) (56 - 87)  ABP: --  ABP(mean): --  RR: 26 (25 May 2022 04:00) (21 - 32)  SpO2: 99% (25 May 2022 05:25) (92% - 100%)       Mode: AC/ CMV (Assist Control/ Continuous Mandatory Ventilation)  RR (machine): 26  TV (machine): 400  FiO2: 50  PEEP: 5  ITime: 0.9  MAP: 16  PIP: 36      Input and Output:  I&O's Detail    23 May 2022 07:01  -  24 May 2022 07:00  --------------------------------------------------------  IN:    IV PiggyBack: 400 mL    Jevity 1.5: 240 mL  Total IN: 640 mL    OUT:  Total OUT: 0 mL    Total NET: 640 mL      24 May 2022 07:01  -  25 May 2022 06:31  --------------------------------------------------------  IN:    Jevity 1.5: 380 mL  Total IN: 380 mL    OUT:  Total OUT: 0 mL    Total NET: 380 mL          Lab Data                        8.0    14.23 )-----------( 291      ( 24 May 2022 06:56 )             27.0     05-24    144  |  106  |  40<H>  ----------------------------<  198<H>  3.6   |  28  |  1.48<H>    Ca    8.0<L>      24 May 2022 06:56  Phos  2.1     05-23    TPro  5.7<L>  /  Alb  1.8<L>  /  TBili  0.6  /  DBili  x   /  AST  47<H>  /  ALT  105<H>  /  AlkPhos  124<H>  05-24            Review of Systems	      Objective     Physical Examination    heart s1s2  lung dec BS  abd soft      Pertinent Lab findings & Imaging      Annalise:  NO   Adequate UO     I&O's Detail    23 May 2022 07:01  -  24 May 2022 07:00  --------------------------------------------------------  IN:    IV PiggyBack: 400 mL    Jevity 1.5: 240 mL  Total IN: 640 mL    OUT:  Total OUT: 0 mL    Total NET: 640 mL      24 May 2022 07:01  -  25 May 2022 06:31  --------------------------------------------------------  IN:    Jevity 1.5: 380 mL  Total IN: 380 mL    OUT:  Total OUT: 0 mL    Total NET: 380 mL               Discussed with:     Cultures:	        Radiology

## 2022-05-25 NOTE — CONSULT NOTE ADULT - SUBJECTIVE AND OBJECTIVE BOX
HPI:  This is an 80 y/o F with PMH of HTN, DM type 2, Cardiac Arrest, CVA, COPD, Chronic Respiratory Failure Vent Dependant s/p trach & PEG, Multiple Hospital Admissions for Aspiration & vent associated PNA, COVID-19 Infection, Anemia with GI blood loss, GERD, and Advanced Dementia who was sent from [facility] facility for acute respiratory distress with unknown onset, no reported fever or chills. On arrival to ED she was found with tachypnea & dropping of her SPO2 on same vent settings, CT chest without contrast revealed worse B/L PNA & B/L pleural effusion compared with her CT one month ago. Patient was discharged from hospital 3 weeks ago after admission for a similar condition, No history could obtained given her status. (21 May 2022 23:41)      PAST MEDICAL & SURGICAL HISTORY:  Dementia of frontal lobe type      Aphasic stroke      Diabetes mellitus      Respiratory failure      Hypertension      GERD (gastroesophageal reflux disease)      Constipation      Respiratory failure      CVA (cerebral vascular accident)      HTN (hypertension)      DM (diabetes mellitus)      Advanced dementia      COVID-19 virus detected      Quadriplegia      Pneumonia      Type II diabetes mellitus      Hx of appendectomy      Gastrostomy in place      Tracheostomy in place      Tracheostomy tube present      Feeding by G-tube    REVIEW OF SYSTEMS  Unable to obtain ROS  2/2 non-verbal status        MEDICATIONS  (STANDING):  ceftazidime/avibactam IVPB 1.25 Gram(s) IV Intermittent every 12 hours  chlorhexidine 0.12% Liquid 15 milliLiter(s) Oral Mucosa every 12 hours  chlorhexidine 2% Cloths 1 Application(s) Topical <User Schedule>  chlorhexidine 4% Liquid 1 Application(s) Topical <User Schedule>  dextrose 5%. 1000 milliLiter(s) (50 mL/Hr) IV Continuous <Continuous>  dextrose 5%. 1000 milliLiter(s) (100 mL/Hr) IV Continuous <Continuous>  dextrose 50% Injectable 25 Gram(s) IV Push once  dextrose 50% Injectable 12.5 Gram(s) IV Push once  dextrose 50% Injectable 25 Gram(s) IV Push once  glucagon  Injectable 1 milliGRAM(s) IntraMuscular once  heparin   Injectable 5000 Unit(s) SubCutaneous every 8 hours  insulin glargine Injectable (LANTUS) 5 Unit(s) SubCutaneous every morning  insulin lispro (ADMELOG) corrective regimen sliding scale   SubCutaneous every 6 hours  iron sucrose IVPB 200 milliGRAM(s) IV Intermittent every 24 hours  lactobacillus acidophilus 1 Tablet(s) Oral daily  linezolid  IVPB 600 milliGRAM(s) IV Intermittent every 12 hours  LORazepam     Tablet 1 milliGRAM(s) Oral every 4 hours  mineral oil/petrolatum Hydrophilic Ointment 1 Application(s) Topical daily  nystatin Powder 1 Application(s) Topical three times a day  pantoprazole  Injectable 40 milliGRAM(s) IV Push daily    MEDICATIONS  (PRN):  acetaminophen    Suspension .. 650 milliGRAM(s) Oral every 6 hours PRN Temp greater or equal to 38C (100.4F), Mild Pain (1 - 3)  albuterol/ipratropium for Nebulization 3 milliLiter(s) Nebulizer every 6 hours PRN Shortness of Breath and/or Wheezing  dextrose Oral Gel 15 Gram(s) Oral once PRN Blood Glucose LESS THAN 70 milliGRAM(s)/deciliter  LORazepam   Injectable 1 milliGRAM(s) IV Push every 6 hours PRN agitation/vent dyssynchrony  sodium chloride 0.9% lock flush 10 milliLiter(s) IV Push every 1 hour PRN Pre/post blood products, medications, blood draw, and to maintain line patency      Allergies    codeine (Hives)    Intolerances        SOCIAL HISTORY:      FAMILY HISTORY:  No pertinent family history in first degree relatives        Vital Signs Last 24 Hrs  T(C): 36.8 (25 May 2022 12:31), Max: 37.2 (24 May 2022 15:47)  T(F): 98.3 (25 May 2022 12:31), Max: 98.9 (24 May 2022 15:47)  HR: 63 (25 May 2022 14:26) (60 - 100)  BP: 116/58 (25 May 2022 13:00) (90/54 - 138/63)  BP(mean): 76 (25 May 2022 13:00) (63 - 87)  RR: 26 (25 May 2022 13:00) (21 - 33)  SpO2: 100% (25 May 2022 14:26) (92% - 100%)                              7.8    11.83 )-----------( 255      ( 25 May 2022 06:00 )             26.1     05-25    143  |  106  |  38<H>  ----------------------------<  204<H>  3.6   |  28  |  1.41<H>    Ca    8.1<L>      25 May 2022 06:00  Phos  4.1     05-25  Mg     3.1     05-25    TPro  5.7<L>  /  Alb  1.8<L>  /  TBili  0.6  /  DBili  x   /  AST  36  /  ALT  87<H>  /  AlkPhos  131<H>  05-25      A1C with Estimated Average Glucose Result: 6.4 % (04-22-22 @ 12:14)      PHYSICAL EXAM:    General: resting comfortably in bed; NAD  ENT: no nasal discharge;  Neck: trach in place/vented  Extremities: Bilateral drop foot: necrotic ulcerations bilateral feet  Neurologic:  Can not follow commands,

## 2022-05-25 NOTE — PROGRESS NOTE ADULT - ASSESSMENT
REVIEW OF SYMPTOMS      Able to give (reliable) ROS  NO     PHYSICAL EXAM    HEENT Unremarkable  atraumatic   RESP Fair air entry EXP prolonged    Harsh breath sound Resp distres mild   CARDIAC S1 S2 No S3     NO JVD    ABDOMEN SOFT BS PRESENT NOT DISTENDED No hepatosplenomegaly   PEDAL EDEMA present No calf tenderness  NO rash       AGE/SEX.   76 f    DOA.  5/21/2022     CC .  5/21/2022 AOC RESP FAILURE     PMH .  pmh Hosp stay given zosyn 4/21  pmh Pneumonia    pmh HTN,   pmh DM,   pmh CVA,   pmh  chronic respiratory failure   pmh s/p trach, PEG,   pmh cardiac arrest      MAIN ISSUES  .  SEVERE HYPOXIC RESP FAILURE poa   VAP poa   SHOCK poa   BL PL EFFSNS   CHF  PULM HYTN   ANEMIA 5/23/2022 Hb 6.8   ELEVATED LFTS         COVID/ICU/CODE STATUS.                       COVID  STATUS. 5/21/2022 scv2 (-)           ICU STAY. 5/21  GOC.  5/24/2022 full code    BEST PRACTICE ISSUES.                                                  HEAD OF BED ELEVATION. Yes  DVT PROPHYLAXIS.     5/21/2022 hpsc    SQUIRSE PROPHYLAXIS.5/22/2022 protonix 40   INFECTION PROPHYLAXIS.   5/21/2022 Ch;lorhexidine .12%   5/21/2022 Chlorhexidine 2%                                                                                         DIET.   5/22/2022 jevity 1.5 20 gt     VITALS/PO/IO/VENT/DRIPS.     5/25/2022 afeb 116/58   5/25/2022 u 200   5/25/2022 ac 26/400/5/.5      PROBLEM DATA/ASSESSMENT RECOMMENDATIONS (A/R).    HEMODYNAMICS.   Monitor and optimize bp   Target MAP to miguel  65 (+)    RESP.   Monitor and optimize  po   Target po to remain 90-95%    ABG.  5/23/2022 5a ac 26/400/10/70%  755/34/81   5/22/2022 5a vent 90% 744/41/117   5/21/2022 8p 100% vent 739/50/48    PMH/PSH PROBLEMS.  Management continued/modified as indicated      VENT MANAGEMENT.   HOB elevation  Target Pplat 30 (-)  Target PO 90-95%  Target pH 730 (+)  Daily spontaneous breathing trials   Daily sedation vacation         DIARRHEA.  5/23/2022 c diff (-)     PICC poa   5/23/2022 Ordered to dc picc    INFECTION SOURCE DD.   INFECTION A/R.   Has ho crp   Has ho iv abio within last 90 d  Is on bsab   id eval Dr BRITTANY Brantley appreciated   Started on ceftazidime avibactam 1.25x2 5/22/2022    MICROBIO.  Rvp 5/21/2022 (-)   urine c 5/21 (-)   blod c 5/21 (-)   sputum 5/23 numerous pseudomonas   c diff 5/23/2022 c diff (-)   mrsa 5/24 (-)   PAST MICROBIO.  4/24/2022 sputum CRABAPENEM RESISTANT PSEUDOMONAS  ABIO.  5/22/2022 ceftazidime avibactam 1.25x2   5/21/2022 linezolid    SEVERE HYPOXIC RESP FAILURE poa.   History Patient is in VDRF for severeal months and is in semi-vegetative state in proloned coma with trach peg post cacseveral mo ago   A/R   5/24/2022 Oxygenation improved Now on 50% O2     RO VTE.  V duplx 5/23/2022 (-)     COPD.  5/22/2022 duoneb     PLEURAL EFFSNS.  echo 9/8/2021   n lvsf  mild dd  n rvsf  rvsp 51   ct 4/22/2022 ct ch 4/21/2022   bl effsns increased in size cw 1/2022 5/25/2022 1.5 l clear pl effsn removed left side IR    MI.  5/23-5/24/2022 Tr 258-155     CHF.  echo 9/8/2021   n lvsf   mild dd   n rvsf   rvsp 51   bnp 5/21/2022 bnp 20702 5/22/2022 cardio consulted      ANEMIA.   Hb 5/21-5/22-5/23-5/24-5/25/2022   Hb 8 - 7.2 - 6.8 - 8 - 7.8  monitor  target hb 7 (+)     TRANSFUSION.  5/23/2022 1 u prbc    RYAN.  Na 5/21/2022 Na 140  CO2 5/21/2022 CO2 30   Cr 5/21-5/22-5/23-5/24-5/25/2022   Cr 1.6 - 1.5- 1.4 - 1.4-1.4     monitor     ELEVATED LFTS.  LFTS 5/22-5/23-5/24/2022  - 136-124   - 104-47   - 149 - 105  5/22/2022 us abd fibrofatty liver dis borderline gbw thick tr perichole fluid   5/22/2022 check hep panel      TIME SPENT   Over 36  minutes aggregate critical care time spent on encounter; activities included   direct patient care, counseling and/or coordinating care reviewing notes, lab data/ imaging , discussion with multidisciplinary team/ patient  /family and explaining in detail risks, benefits, alternatives  of the recommendations     CHAPINCITO GUIDRY 76 f [facility] S 5/21/2022

## 2022-05-25 NOTE — PROGRESS NOTE ADULT - ASSESSMENT
This is an 78 y/o F with PMH of HTN, DM type 2, Cardiac Arrest, CVA, COPD, Chronic Respiratory Failure Vent Dependant s/p trach & PEG, Multiple Hospital Admissions for Aspiration & vent associated PNA, COVID-19 Infection, Anemia with GI blood loss, GERD, and Advanced Dementia who was sent from [facility] facility for acute respiratory distress.     Sepsis 2/2 VAP/PNA c/b pleural effusions  Pleural Effusions: parapneumonic vs. empyema  Acute on Chronic RF, vented   - pt w/ hx of multiple admissions  - pt p/w acute RF, likely recurrent VAP  - prior sputum cx reviewed. CRE P. aeruginosa and MDRO S. marascens  - repeat sputum cx w/ moderate GNR/Pseudomonas  - BCx x2 and UCx NGTD  - MRSA swab negative  - imaging reviewed     -- CT Chest w/ new b/l GGO, patchy consolidation, and septal thickening are of uncertain etiology w/ persistent b/l pleural effusions (left greater than   right), the near complete consolidation of the left lower lobe and right lower lobe    -- RUQ sono Tiny gallstones and/or gallbladder sludge with borderline gallbladder wall thickening and trace pericholecystic fluid.   - s/p IR guided thoracentesis on 5/25  Plan:  F/u pending uPNA Ag  F/u final sputum cx  F/u pleural fluid studies including cx  C/w avycaz for now  C/w linezolid, will await pleural fluid cx to r/o GPC  C/w vent management per ICU  Pulm toilet  Maintain aspiration precautions  Trend temps/WBC    Infectious Diseases will continue to follow. Please call with any questions.   Andressa Brantley M.D.  [facility], Division of Infectious Diseases [phone]

## 2022-05-25 NOTE — PROGRESS NOTE ADULT - ASSESSMENT
The patient is a 76 year old female with a history of HTN, DM, CVA, dementia, chronic respiratory failure s/p trach, PEG, cardiac arrest, anemia who presents with respiratory distress.    Plan:  - ECG with no evidence of ischemia or infarction  - Troponin elevated at 257 in the setting of respiratory failure and septic shock. No need to trend further.  - Echo 9/21 with normal LV systolic function, mod pulm HTN  - On ceftazidime, linezolid  - Pulm and ID follow-up

## 2022-05-25 NOTE — CHART NOTE - NSCHARTNOTEFT_GEN_A_CORE
PERFORATED TRACHE.   5/25/2022 Recd call from Dr Manning (?) on voicemail that pt has "perforated trachea  5/25/2022 3:08 PM Spoke to Dr Topete and he tolsd me that after reviewing cxr there is no perforated trachea and that if we want to we can get ct chest toconfirm   5/25/2022 3:08 PM DW DR Hurt  Will get CT chest no contrast    5/25/2022 : agree with Dr Topete that if this was perforatefd trach pt would by now have tension pneumothorax being on vent

## 2022-05-25 NOTE — PROGRESS NOTE ADULT - SUBJECTIVE AND OBJECTIVE BOX
BREA BECKHAM     [facility] 04    Allergies    codeine (Hives)    Intolerances        PAST MEDICAL & SURGICAL HISTORY:  Dementia of frontal lobe type      Aphasic stroke      Diabetes mellitus      Respiratory failure      Hypertension      GERD (gastroesophageal reflux disease)      Constipation      Respiratory failure      CVA (cerebral vascular accident)      HTN (hypertension)      DM (diabetes mellitus)      Advanced dementia      COVID-19 virus detected      Quadriplegia      Pneumonia      Type II diabetes mellitus      Hx of appendectomy      Gastrostomy in place      Tracheostomy in place      Tracheostomy tube present      Feeding by G-tube          FAMILY HISTORY:  No pertinent family history in first degree relatives        Home Medications:  albuterol 90 mcg/inh inhalation aerosol with adapter: 2  inhaled every 6 hours (21 May 2022 21:16)  Bacid (LAC) oral tablet: 2 tab(s) by gastrostomy tube once a day (21 May 2022 21:16)  Basaglar KwikPen 100 units/mL subcutaneous solution: 36 unit(s) subcutaneous once a day (at bedtime) (21 May 2022 21:16)  Betadine 10% topical swab: cleanse right and left great toes (21 May 2022 21:16)  Carafate 1 g/10 mL oral suspension: 10 milliliter(s) by gastrostomy tube 4 times a day (before meals and at bedtime) for 14 days (Started 6/4/21) (21 May 2022 21:16)  chlorhexidine 0.12% mucous membrane liquid: 15 milliliter(s) mucous membrane 2 times a day (21 May 2022 21:16)  Eucerin topical cream: Apply topically to affected area once a day bilateral feet (21 May 2022 21:16)  folic acid 1 mg oral tablet: 1 tab(s) orally once a day (21 May 2022 21:16)  ipratropium-albuterol 0.5 mg-2.5 mg/3 mL inhalation solution: 3 milliliter(s) inhaled 4 times a day (21 May 2022 21:16)  LORazepam 1 mg oral tablet: 1 tab(s) by gastrostomy tube every 4 hours (21 May 2022 21:16)  methocarbamol 500 mg oral tablet: 1 tab(s) by gastrostomy tube 2 times a day (21 May 2022 21:16)  MiraLax oral powder for reconstitution:  (21 May 2022 23:14)  Multiple Vitamins oral tablet: 1 tab(s) orally once a day (21 May 2022 21:16)  omeprazole 20 mg oral delayed release capsule: orally 2 times a day (21 May 2022 23:14)  polyethylene glycol 3350 oral powder for reconstitution: 17 gram(s) by gastrostomy tube every 12 hours (21 May 2022 21:16)  senna 8.6 mg oral tablet: 3 tab(s) by gastrostomy tube once a day (at bedtime) (21 May 2022 21:16)  simethicone 80 mg oral tablet, chewable: 1 tab(s) by gastrostomy tube every 6 hours (21 May 2022 21:16)  Tylenol 325 mg oral tablet: 2 tab(s) by gastrostomy tube once a day; 60 minutes prior to dressing change  (21 May 2022 21:16)  Tylenol 325 mg oral tablet: 2 tab(s) by gastrostomy tube every 6 hours, As Needed (21 May 2022 21:16)  Zosyn:  (21 May 2022 23:14)      MEDICATIONS  (STANDING):  ceftazidime/avibactam IVPB 1.25 Gram(s) IV Intermittent every 12 hours  chlorhexidine 0.12% Liquid 15 milliLiter(s) Oral Mucosa every 12 hours  chlorhexidine 2% Cloths 1 Application(s) Topical <User Schedule>  chlorhexidine 4% Liquid 1 Application(s) Topical <User Schedule>  dextrose 5%. 1000 milliLiter(s) (50 mL/Hr) IV Continuous <Continuous>  dextrose 5%. 1000 milliLiter(s) (100 mL/Hr) IV Continuous <Continuous>  dextrose 50% Injectable 25 Gram(s) IV Push once  dextrose 50% Injectable 12.5 Gram(s) IV Push once  dextrose 50% Injectable 25 Gram(s) IV Push once  glucagon  Injectable 1 milliGRAM(s) IntraMuscular once  heparin   Injectable 5000 Unit(s) SubCutaneous every 8 hours  insulin glargine Injectable (LANTUS) 5 Unit(s) SubCutaneous every morning  insulin lispro (ADMELOG) corrective regimen sliding scale   SubCutaneous every 6 hours  lactobacillus acidophilus 1 Tablet(s) Oral daily  linezolid  IVPB 600 milliGRAM(s) IV Intermittent every 12 hours  LORazepam     Tablet 1 milliGRAM(s) Oral every 4 hours  mineral oil/petrolatum Hydrophilic Ointment 1 Application(s) Topical daily  nystatin Powder 1 Application(s) Topical three times a day  pantoprazole  Injectable 40 milliGRAM(s) IV Push daily    MEDICATIONS  (PRN):  acetaminophen    Suspension .. 650 milliGRAM(s) Oral every 6 hours PRN Temp greater or equal to 38C (100.4F), Mild Pain (1 - 3)  albuterol/ipratropium for Nebulization 3 milliLiter(s) Nebulizer every 6 hours PRN Shortness of Breath and/or Wheezing  dextrose Oral Gel 15 Gram(s) Oral once PRN Blood Glucose LESS THAN 70 milliGRAM(s)/deciliter  LORazepam   Injectable 1 milliGRAM(s) IV Push every 6 hours PRN agitation/vent dyssynchrony  sodium chloride 0.9% lock flush 10 milliLiter(s) IV Push every 1 hour PRN Pre/post blood products, medications, blood draw, and to maintain line patency      Diet, NPO with Tube Feed:   Tube Feeding Modality: Gastrostomy  Glucerna 1.5 Horacio  Continuous  Starting Tube Feed Rate mL per Hour: 20  Until Goal Tube Feed Rate (mL per Hour): 20  Tube Feed Duration (in Hours): 24  Tube Feed Start Time: 12:00 (05-24-22 @ 10:55) [Pending Verification By Attending]  Diet, NPO with Tube Feed:   Tube Feeding Modality: Gastrostomy  Jevity 1.5 Horacio  Total Volume for 24 Hours (mL): 480  Continuous  Starting Tube Feed Rate mL per Hour: 20  Until Goal Tube Feed Rate (mL per Hour): 20  Tube Feed Duration (in Hours): 24  Tube Feed Start Time: 18:30 (05-22-22 @ 18:06) [Active]  Diet, NPO with Tube Feed:   Tube Feeding Modality: Gastrostomy  Glucerna 1.5 Horacio  Total Volume for 24 Hours (mL): 1200  Continuous  Starting Tube Feed Rate mL per Hour: 20  Increase Tube Feed Rate by (mL): 10     Every 6 hours  Until Goal Tube Feed Rate (mL per Hour): 60  Tube Feed Duration (in Hours): 20  Tube Feed Start Time: 00:00 (05-22-22 @ 11:42) [Pending Verification By Attending]          Vital Signs Last 24 Hrs  T(C): 36.9 (25 May 2022 03:44), Max: 37.4 (24 May 2022 08:30)  T(F): 98.4 (25 May 2022 03:44), Max: 99.4 (24 May 2022 08:30)  HR: 71 (25 May 2022 07:00) (70 - 97)  BP: 138/63 (25 May 2022 07:00) (82/43 - 138/63)  BP(mean): 83 (25 May 2022 07:00) (56 - 87)  RR: 26 (25 May 2022 07:00) (21 - 32)  SpO2: 99% (25 May 2022 07:00) (92% - 100%)      05-24-22 @ 07:01  -  05-25-22 @ 07:00  --------------------------------------------------------  IN: 860 mL / OUT: 200 mL / NET: 660 mL        Mode: AC/ CMV (Assist Control/ Continuous Mandatory Ventilation), RR (machine): 26, TV (machine): 400, FiO2: 50, PEEP: 5, ITime: 0.9, MAP: 16, PIP: 36      LABS:                        7.8    11.83 )-----------( 255      ( 25 May 2022 06:00 )             26.1     05-25    143  |  106  |  38<H>  ----------------------------<  204<H>  3.6   |  28  |  1.41<H>    Ca    8.1<L>      25 May 2022 06:00  Phos  4.1     05-25  Mg     3.1     05-25    TPro  5.7<L>  /  Alb  1.8<L>  /  TBili  0.6  /  DBili  x   /  AST  36  /  ALT  87<H>  /  AlkPhos  131<H>  05-25    PT/INR - ( 24 May 2022 06:56 )   PT: 14.2 sec;   INR: 1.20 ratio                   WBC:  WBC Count: 11.83 K/uL (05-25 @ 06:00)  WBC Count: 14.23 K/uL (05-24 @ 06:56)  WBC Count: 13.05 K/uL (05-23 @ 06:44)  WBC Count: 13.09 K/uL (05-22 @ 06:23)  WBC Count: 21.57 K/uL (05-22 @ 01:22)  WBC Count: 20.49 K/uL (05-21 @ 22:30)  WBC Count: 22.94 K/uL (05-21 @ 20:28)      MICROBIOLOGY:  RECENT CULTURES:  05-23 Trach Asp Tracheal Aspirate XXXX   Moderate polymorphonuclear leukocytes per low power field  No Squamous epithelial cells per low power field  Moderate Gram Negative Rods per oil power field   Numerous Mixed gram negative rods including  Numerous Pseudomonas aeruginosa    05-21 .Blood Blood-Peripheral XXXX XXXX   No growth to date.    05-21 Clean Catch Clean Catch (Midstream) XXXX XXXX   <10,000 CFU/mL Normal Urogenital Elvira                PT/INR - ( 24 May 2022 06:56 )   PT: 14.2 sec;   INR: 1.20 ratio             Sodium:  Sodium, Serum: 143 mmol/L (05-25 @ 06:00)  Sodium, Serum: 144 mmol/L (05-24 @ 06:56)  Sodium, Serum: 143 mmol/L (05-23 @ 06:44)  Sodium, Serum: 140 mmol/L (05-22 @ 06:23)  Sodium, Serum: 140 mmol/L (05-22 @ 01:22)  Sodium, Serum: 140 mmol/L (05-21 @ 22:30)  Sodium, Serum: 141 mmol/L (05-21 @ 20:28)      1.41 mg/dL 05-25 @ 06:00  1.48 mg/dL 05-24 @ 06:56  1.48 mg/dL 05-23 @ 06:44  1.59 mg/dL 05-22 @ 06:23  1.72 mg/dL 05-22 @ 01:22  1.65 mg/dL 05-21 @ 22:30  1.62 mg/dL 05-21 @ 20:28      Hemoglobin:  Hemoglobin: 7.8 g/dL (05-25 @ 06:00)  Hemoglobin: 8.0 g/dL (05-24 @ 06:56)  Hemoglobin: 8.2 g/dL (05-23 @ 20:09)  Hemoglobin: 6.8 g/dL (05-23 @ 06:44)  Hemoglobin: 7.2 g/dL (05-22 @ 06:23)  Hemoglobin: 7.3 g/dL (05-22 @ 01:22)  Hemoglobin: 8.0 g/dL (05-21 @ 22:30)  Hemoglobin: 8.1 g/dL (05-21 @ 20:28)      Platelets: Platelet Count - Automated: 255 K/uL (05-25 @ 06:00)  Platelet Count - Automated: 291 K/uL (05-24 @ 06:56)  Platelet Count - Automated: 317 K/uL (05-23 @ 06:44)  Platelet Count - Automated: 332 K/uL (05-22 @ 06:23)  Platelet Count - Automated: 423 K/uL (05-22 @ 01:22)  Platelet Count - Automated: 331 K/uL (05-21 @ 22:30)  Platelet Count - Automated: 383 K/uL (05-21 @ 20:28)      LIVER FUNCTIONS - ( 25 May 2022 06:00 )  Alb: 1.8 g/dL / Pro: 5.7 g/dL / ALK PHOS: 131 U/L / ALT: 87 U/L DA / AST: 36 U/L / GGT: x                 RADIOLOGY & ADDITIONAL STUDIES:      MICROBIOLOGY:  RECENT CULTURES:  05-23 Trach Asp Tracheal Aspirate XXXX   Moderate polymorphonuclear leukocytes per low power field  No Squamous epithelial cells per low power field  Moderate Gram Negative Rods per oil power field   Numerous Mixed gram negative rods including  Numerous Pseudomonas aeruginosa    05-21 .Blood Blood-Peripheral XXXX XXXX   No growth to date.    05-21 Clean Catch Clean Catch (Midstream) XXXX XXXX   <10,000 CFU/mL Normal Urogenital Elvira

## 2022-05-25 NOTE — PROGRESS NOTE ADULT - SUBJECTIVE AND OBJECTIVE BOX
Subjective: Patient seen and examined. No overnight events.  Pending Thoracentesis later today.     MEDICATIONS  (STANDING):  ceftazidime/avibactam IVPB 1.25 Gram(s) IV Intermittent every 12 hours  chlorhexidine 0.12% Liquid 15 milliLiter(s) Oral Mucosa every 12 hours  chlorhexidine 2% Cloths 1 Application(s) Topical <User Schedule>  chlorhexidine 4% Liquid 1 Application(s) Topical <User Schedule>  dextrose 5%. 1000 milliLiter(s) (50 mL/Hr) IV Continuous <Continuous>  dextrose 5%. 1000 milliLiter(s) (100 mL/Hr) IV Continuous <Continuous>  dextrose 50% Injectable 25 Gram(s) IV Push once  dextrose 50% Injectable 12.5 Gram(s) IV Push once  dextrose 50% Injectable 25 Gram(s) IV Push once  glucagon  Injectable 1 milliGRAM(s) IntraMuscular once  heparin   Injectable 5000 Unit(s) SubCutaneous every 8 hours  insulin glargine Injectable (LANTUS) 5 Unit(s) SubCutaneous every morning  insulin lispro (ADMELOG) corrective regimen sliding scale   SubCutaneous every 6 hours  lactobacillus acidophilus 1 Tablet(s) Oral daily  linezolid  IVPB 600 milliGRAM(s) IV Intermittent every 12 hours  LORazepam     Tablet 1 milliGRAM(s) Oral every 4 hours  mineral oil/petrolatum Hydrophilic Ointment 1 Application(s) Topical daily  nystatin Powder 1 Application(s) Topical three times a day  pantoprazole  Injectable 40 milliGRAM(s) IV Push daily    MEDICATIONS  (PRN):  acetaminophen    Suspension .. 650 milliGRAM(s) Oral every 6 hours PRN Temp greater or equal to 38C (100.4F), Mild Pain (1 - 3)  albuterol/ipratropium for Nebulization 3 milliLiter(s) Nebulizer every 6 hours PRN Shortness of Breath and/or Wheezing  dextrose Oral Gel 15 Gram(s) Oral once PRN Blood Glucose LESS THAN 70 milliGRAM(s)/deciliter  LORazepam   Injectable 1 milliGRAM(s) IV Push every 6 hours PRN agitation/vent dyssynchrony  sodium chloride 0.9% lock flush 10 milliLiter(s) IV Push every 1 hour PRN Pre/post blood products, medications, blood draw, and to maintain line patency      Allergies    codeine (Hives)    Intolerances        Vital Signs Last 24 Hrs  T(C): 36.9 (25 May 2022 03:44), Max: 37.2 (24 May 2022 15:47)  T(F): 98.4 (25 May 2022 03:44), Max: 98.9 (24 May 2022 15:47)  HR: 68 (25 May 2022 08:00) (68 - 97)  BP: 113/57 (25 May 2022 08:00) (82/43 - 138/63)  BP(mean): 74 (25 May 2022 08:00) (56 - 87)  RR: 23 (25 May 2022 08:00) (21 - 32)  SpO2: 99% (25 May 2022 08:00) (92% - 100%)    PHYSICAL EXAM:  GENERAL: Laying in bed with eyes open and unresponsive   HEAD:  Atraumatic, Normocephalic  ENMT: Moist mucous membranes,   NECK: Supple, No JVD, Normal thyroid; Trach to Vent   CHEST/LUNG: Clear to auscultation bilaterally; No rales, rhonchi, wheezing, or rubs  HEART: Regular rate and rhythm; No murmurs, rubs, or gallops  ABDOMEN: Distended and PEG Tube +   EXTREMITIES:  2+ Peripheral Pulses, No clubbing, cyanosis, or edema      LABS:                        7.8    11.83 )-----------( 255      ( 25 May 2022 06:00 )             26.1     25 May 2022 06:00    143    |  106    |  38     ----------------------------<  204    3.6     |  28     |  1.41     Ca    8.1        25 May 2022 06:00  Phos  4.1       25 May 2022 06:00  Mg     3.1       25 May 2022 06:00    TPro  5.7    /  Alb  1.8    /  TBili  0.6    /  DBili  x      /  AST  36     /  ALT  87     /  AlkPhos  131    25 May 2022 06:00    PT/INR - ( 24 May 2022 06:56 )   PT: 14.2 sec;   INR: 1.20 ratio             CAPILLARY BLOOD GLUCOSE      POCT Blood Glucose.: 192 mg/dL (25 May 2022 05:36)  POCT Blood Glucose.: 140 mg/dL (24 May 2022 23:48)  POCT Blood Glucose.: 249 mg/dL (24 May 2022 18:08)  POCT Blood Glucose.: 169 mg/dL (24 May 2022 11:43)      RADIOLOGY & ADDITIONAL TESTS:    Imaging Personally Reviewed:  [ ] YES     Consultant(s) Notes Reviewed:      Care Discussed with Consultants/Other Providers:    Advanced Directives: [ ] DNR  [ ] No feeding tube  [ ] MOLST in chart  [ ] MOLST completed today  [ ] Unknown

## 2022-05-25 NOTE — PROGRESS NOTE ADULT - SUBJECTIVE AND OBJECTIVE BOX
Patient is a 76y old  Female who presents with a chief complaint of Acute respiratory distress. (24 May 2022 11:08)    HPI:  78 y/o F with PMH of HTN, DM type 2, Cardiac Arrest, CVA, COPD, Chronic Respiratory Failure Vent Dependant s/p trach & PEG, Multiple Hospital Admissions for Aspiration & vent associated PNA, COVID-19 Infection, Anemia with GI blood loss, GERD, and Advanced Dementia who was sent from [facility] facility for acute respiratory distress with unknown onset. Admitted with acute on chronic hypoxic respiratory failure and septic shock    Allergies: codeine    PAST MEDICAL & SURGICAL HISTORY:  Dementia of frontal lobe type      Aphasic stroke      Diabetes mellitus      Respiratory failure      Hypertension      GERD (gastroesophageal reflux disease)      Constipation      Respiratory failure      CVA (cerebral vascular accident)      HTN (hypertension)      DM (diabetes mellitus)      Advanced dementia      COVID-19 virus detected      Quadriplegia      Pneumonia      Type II diabetes mellitus      Hx of appendectomy      Gastrostomy in place      Tracheostomy in place      Tracheostomy tube present      Feeding by G-tube        FAMILY HISTORY:  No pertinent family history in first degree relatives      SOCIAL HISTORY:    Home Medications:    Review of Systems:  Unable to participate in ROS    T(F): 98.4 (05-25-22 @ 03:44), Max: 99.4 (05-24-22 @ 08:30)  HR: 70 (05-25-22 @ 05:25) (70 - 97)  BP: 103/60 (05-25-22 @ 04:00) (82/43 - 129/68)  RR: 26 (05-25-22 @ 04:00) (21 - 32)  SpO2: 99% (05-25-22 @ 05:25)  Wt(kg): --  Mode: AC/ CMV (Assist Control/ Continuous Mandatory Ventilation), RR (machine): 26, TV (machine): 400, FiO2: 50, PEEP: 5  CAPILLARY BLOOD GLUCOSE      POCT Blood Glucose.: 192 mg/dL (25 May 2022 05:36)    I&O's Summary    23 May 2022 07:01  -  24 May 2022 07:00  --------------------------------------------------------  IN: 640 mL / OUT: 0 mL / NET: 640 mL    24 May 2022 07:01  -  25 May 2022 06:17  --------------------------------------------------------  IN: 380 mL / OUT: 0 mL / NET: 380 mL        Physical Exam:     Gen: chronically ill appearing,   Neuro: grimaces to stimuli  CVS: +S1S2  Resp: Trach to vent  Abd: +PEG, soft  Ext: contracted  Skin: well perfused    Meds:  ceftazidime/avibactam IVPB 1.25 Gram(s) IV Intermittent every 12 hours  linezolid  IVPB 600 milliGRAM(s) IV Intermittent every 12 hours       dextrose 50% Injectable 25 Gram(s) IV Push once  dextrose 50% Injectable 12.5 Gram(s) IV Push once  dextrose 50% Injectable 25 Gram(s) IV Push once  dextrose Oral Gel 15 Gram(s) Oral once PRN  glucagon  Injectable 1 milliGRAM(s) IntraMuscular once  insulin glargine Injectable (LANTUS) 5 Unit(s) SubCutaneous every morning  insulin lispro (ADMELOG) corrective regimen sliding scale   SubCutaneous every 6 hours     albuterol/ipratropium for Nebulization 3 milliLiter(s) Nebulizer every 6 hours PRN     acetaminophen    Suspension .. 650 milliGRAM(s) Oral every 6 hours PRN  LORazepam     Tablet 1 milliGRAM(s) Oral every 4 hours  LORazepam   Injectable 1 milliGRAM(s) IV Push every 6 hours PRN        heparin   Injectable 5000 Unit(s) SubCutaneous every 8 hours     pantoprazole  Injectable 40 milliGRAM(s) IV Push daily        dextrose 5%. 1000 milliLiter(s) IV Continuous <Continuous>  dextrose 5%. 1000 milliLiter(s) IV Continuous <Continuous>  sodium chloride 0.9% lock flush 10 milliLiter(s) IV Push every 1 hour PRN        chlorhexidine 0.12% Liquid 15 milliLiter(s) Oral Mucosa every 12 hours  chlorhexidine 2% Cloths 1 Application(s) Topical <User Schedule>  chlorhexidine 4% Liquid 1 Application(s) Topical <User Schedule>  mineral oil/petrolatum Hydrophilic Ointment 1 Application(s) Topical daily  nystatin Powder 1 Application(s) Topical three times a day     lactobacillus acidophilus 1 Tablet(s) Oral daily                           8.0    14.23 )-----------( 291      ( 24 May 2022 06:56 )             27.0       05-24    144  |  106  |  40<H>  ----------------------------<  198<H>  3.6   |  28  |  1.48<H>    Ca    8.0<L>      24 May 2022 06:56  Phos  2.1     05-23    TPro  5.7<L>  /  Alb  1.8<L>  /  TBili  0.6  /  DBili  x   /  AST  47<H>  /  ALT  105<H>  /  AlkPhos  124<H>  05-24          PT/INR - ( 24 May 2022 06:56 )   PT: 14.2 sec;   INR: 1.20 ratio             Trach Asp Tracheal Aspirate   Numerous Mixed gram negative rods including  Numerous Pseudomonas aeruginosa   Moderate polymorphonuclear leukocytes per low power field  No Squamous epithelial cells per low power field  Moderate Gram Negative Rods per oil power field 05-23 @ 11:20  .Blood Blood-Peripheral   No growth to date. -- 05-21 @ 21:57  Clean Catch Clean Catch (Midstream)   <10,000 CFU/mL Normal Urogenital Elvira -- 05-21 @ 20:51    C Diff by PCR Result: NotDetec (05-23 @ 12:22)    Rapid RVP Result: NotDetec (05-21 @ 20:51)      Radiology:   < from: US Abdomen Upper Quadrant Right (05.23.22 @ 08:55) >    ACC: [id number] EXAM:  US ABDOMEN RT UPR QUADRANT                          PROCEDURE DATE:  05/23/2022          INTERPRETATION:  CLINICAL INFORMATION: Elevated liver function test    COMPARISON: Ultrasound 11/7/2021, CT 4/21/2022    TECHNIQUE: Sonography of the right upper quadrant.    FINDINGS:  Liver: Increased echogenicity of the hepatic parenchyma with minimal   coarsening the echotexture may reflect hepatic steatosis and/or chronic   fibrofatty parenchymal disease the liver.  Bile ducts: Normal caliber. Common bile duct measures 0.2 cm.  Gallbladder: Tiny gallstones and/or sludge. Trace pericholecystic fluid.   Borderline gallbladder wall thickening. No sonographic Bell sign.  Pancreas: Visualized portions are within normal limits.  Right kidney: 10.2 (cm). No hydronephrosis.  Ascites: None.  IVC: Visualized portions are within normal limits.    Incidental note of a right pleural effusion.    IMPRESSION:  Findings in the liver which may represent chronic fibrofatty parenchymal   disease of the liver and/or hepatic steatosis.    Tiny gallstones and/or gallbladder sludge with borderline gallbladder   wall thickening and trace pericholecystic fluid.    --- End of Report ---            TAURUS ALEGRIA MD; Attending Radiologist  This document has been electronically signed. May 23 2022 11:25AM    < end of copied text >      Problems  -Acute on chronic hypoxic respiraotry failure  -Sepsis  -RYAN    Neuro: Neuro exam at baseline. Home Ativan PRN  CV: Currently HD stable; vasopressors as needed to maintain MAP >65  Resp: Full ventilator support. Fio2 down to 50%. TItrate for O2 sat >90%  GI: PEG tube feeds as tolerated  Renal: RYAN in setting of shock state. Strict I's and O's  ID: Pan cultured. Empiric abx coverage w/ Linezolid and Avycaz. ID following.  Heme: DVT PPX w/ Heparin SC  Endo:

## 2022-05-25 NOTE — PROCEDURE NOTE - PROCEDURE FINDINGS AND DETAILS
1500cc of clear yellow pleuritic fluid removed from the left under sterile conditions and ultrasound guidance.

## 2022-05-26 LAB
ALBUMIN SERPL ELPH-MCNC: 1.7 G/DL — LOW (ref 3.3–5)
ALP SERPL-CCNC: 120 U/L — SIGNIFICANT CHANGE UP (ref 30–120)
ALT FLD-CCNC: 65 U/L DA — HIGH (ref 10–60)
ANION GAP SERPL CALC-SCNC: 10 MMOL/L — SIGNIFICANT CHANGE UP (ref 5–17)
AST SERPL-CCNC: 23 U/L — SIGNIFICANT CHANGE UP (ref 10–40)
B PERT IGG+IGM PNL SER: CLEAR — SIGNIFICANT CHANGE UP
BASOPHILS # BLD AUTO: 0.01 K/UL — SIGNIFICANT CHANGE UP (ref 0–0.2)
BASOPHILS NFR BLD AUTO: 0.1 % — SIGNIFICANT CHANGE UP (ref 0–2)
BILIRUB SERPL-MCNC: 0.6 MG/DL — SIGNIFICANT CHANGE UP (ref 0.2–1.2)
BUN SERPL-MCNC: 28 MG/DL — HIGH (ref 7–23)
CALCIUM SERPL-MCNC: 7.9 MG/DL — LOW (ref 8.4–10.5)
CHLORIDE SERPL-SCNC: 106 MMOL/L — SIGNIFICANT CHANGE UP (ref 96–108)
CO2 SERPL-SCNC: 26 MMOL/L — SIGNIFICANT CHANGE UP (ref 22–31)
COLOR FLD: YELLOW — SIGNIFICANT CHANGE UP
CREAT SERPL-MCNC: 1.28 MG/DL — SIGNIFICANT CHANGE UP (ref 0.5–1.3)
EGFR: 43 ML/MIN/1.73M2 — LOW
EOSINOPHIL # BLD AUTO: 0.14 K/UL — SIGNIFICANT CHANGE UP (ref 0–0.5)
EOSINOPHIL # FLD: 4 % — SIGNIFICANT CHANGE UP
EOSINOPHIL NFR BLD AUTO: 1.2 % — SIGNIFICANT CHANGE UP (ref 0–6)
FLUID INTAKE SUBSTANCE CLASS: SIGNIFICANT CHANGE UP
FOLATE+VIT B12 SERBLD-IMP: 0 % — SIGNIFICANT CHANGE UP
GLUCOSE SERPL-MCNC: 181 MG/DL — HIGH (ref 70–99)
HCT VFR BLD CALC: 25.8 % — LOW (ref 34.5–45)
HGB BLD-MCNC: 7.5 G/DL — LOW (ref 11.5–15.5)
IMM GRANULOCYTES NFR BLD AUTO: 0.3 % — SIGNIFICANT CHANGE UP (ref 0–1.5)
LYMPHOCYTES # BLD AUTO: 0.49 K/UL — LOW (ref 1–3.3)
LYMPHOCYTES # BLD AUTO: 4.3 % — LOW (ref 13–44)
LYMPHOCYTES # FLD: 36 % — SIGNIFICANT CHANGE UP
MCHC RBC-ENTMCNC: 24.7 PG — LOW (ref 27–34)
MCHC RBC-ENTMCNC: 29.1 GM/DL — LOW (ref 32–36)
MCV RBC AUTO: 84.9 FL — SIGNIFICANT CHANGE UP (ref 80–100)
MESOTHL CELL # FLD: 2 % — SIGNIFICANT CHANGE UP
MONOCYTES # BLD AUTO: 0.55 K/UL — SIGNIFICANT CHANGE UP (ref 0–0.9)
MONOCYTES NFR BLD AUTO: 4.9 % — SIGNIFICANT CHANGE UP (ref 2–14)
MONOS+MACROS # FLD: 35 % — SIGNIFICANT CHANGE UP
NEUTROPHILS # BLD AUTO: 10.06 K/UL — HIGH (ref 1.8–7.4)
NEUTROPHILS NFR BLD AUTO: 89.2 % — HIGH (ref 43–77)
NEUTROPHILS-BODY FLUID: 23 % — SIGNIFICANT CHANGE UP
NRBC # BLD: 0 /100 WBCS — SIGNIFICANT CHANGE UP (ref 0–0)
NRBC # FLD: 0 % — SIGNIFICANT CHANGE UP (ref 0–0)
OTHER CELLS FLD MANUAL: 0 % — SIGNIFICANT CHANGE UP
PLATELET # BLD AUTO: 243 K/UL — SIGNIFICANT CHANGE UP (ref 150–400)
POTASSIUM SERPL-MCNC: 3.4 MMOL/L — LOW (ref 3.5–5.3)
POTASSIUM SERPL-SCNC: 3.4 MMOL/L — LOW (ref 3.5–5.3)
PROT SERPL-MCNC: 5.3 G/DL — LOW (ref 6–8.3)
RBC # BLD: 3.04 M/UL — LOW (ref 3.8–5.2)
RBC # FLD: 20.6 % — HIGH (ref 10.3–14.5)
RCV VOL RI: 1000 /UL — HIGH (ref 0–0)
SODIUM SERPL-SCNC: 142 MMOL/L — SIGNIFICANT CHANGE UP (ref 135–145)
TOTAL NUCLEATED CELL COUNT, BODY FLUID: 176 /UL — SIGNIFICANT CHANGE UP
TUBE TYPE: SIGNIFICANT CHANGE UP
WBC # BLD: 11.28 K/UL — HIGH (ref 3.8–10.5)
WBC # FLD AUTO: 11.28 K/UL — HIGH (ref 3.8–10.5)

## 2022-05-26 PROCEDURE — 99233 SBSQ HOSP IP/OBS HIGH 50: CPT

## 2022-05-26 RX ORDER — POTASSIUM CHLORIDE 20 MEQ
40 PACKET (EA) ORAL ONCE
Refills: 0 | Status: COMPLETED | OUTPATIENT
Start: 2022-05-26 | End: 2022-05-26

## 2022-05-26 RX ADMIN — HEPARIN SODIUM 5000 UNIT(S): 5000 INJECTION INTRAVENOUS; SUBCUTANEOUS at 14:00

## 2022-05-26 RX ADMIN — Medication 40 MILLIEQUIVALENT(S): at 12:37

## 2022-05-26 RX ADMIN — CHLORHEXIDINE GLUCONATE 15 MILLILITER(S): 213 SOLUTION TOPICAL at 05:16

## 2022-05-26 RX ADMIN — INSULIN GLARGINE 5 UNIT(S): 100 INJECTION, SOLUTION SUBCUTANEOUS at 08:32

## 2022-05-26 RX ADMIN — CHLORHEXIDINE GLUCONATE 1 APPLICATION(S): 213 SOLUTION TOPICAL at 05:16

## 2022-05-26 RX ADMIN — IRON SUCROSE 110 MILLIGRAM(S): 20 INJECTION, SOLUTION INTRAVENOUS at 11:04

## 2022-05-26 RX ADMIN — Medication 3 MILLILITER(S): at 20:30

## 2022-05-26 RX ADMIN — LINEZOLID 300 MILLIGRAM(S): 600 INJECTION, SOLUTION INTRAVENOUS at 05:16

## 2022-05-26 RX ADMIN — NYSTATIN CREAM 1 APPLICATION(S): 100000 CREAM TOPICAL at 05:15

## 2022-05-26 RX ADMIN — NYSTATIN CREAM 1 APPLICATION(S): 100000 CREAM TOPICAL at 23:07

## 2022-05-26 RX ADMIN — Medication 1 MILLIGRAM(S): at 05:15

## 2022-05-26 RX ADMIN — Medication 1 APPLICATION(S): at 12:56

## 2022-05-26 RX ADMIN — HEPARIN SODIUM 5000 UNIT(S): 5000 INJECTION INTRAVENOUS; SUBCUTANEOUS at 21:52

## 2022-05-26 RX ADMIN — CHLORHEXIDINE GLUCONATE 1 APPLICATION(S): 213 SOLUTION TOPICAL at 08:54

## 2022-05-26 RX ADMIN — Medication 1 MILLIGRAM(S): at 01:22

## 2022-05-26 RX ADMIN — Medication 2: at 05:40

## 2022-05-26 RX ADMIN — Medication 1 MILLIGRAM(S): at 17:22

## 2022-05-26 RX ADMIN — Medication 1 MILLIGRAM(S): at 21:52

## 2022-05-26 RX ADMIN — Medication 1 MILLIGRAM(S): at 04:49

## 2022-05-26 RX ADMIN — Medication 1 MILLIGRAM(S): at 10:06

## 2022-05-26 RX ADMIN — CHLORHEXIDINE GLUCONATE 15 MILLILITER(S): 213 SOLUTION TOPICAL at 17:22

## 2022-05-26 RX ADMIN — NYSTATIN CREAM 1 APPLICATION(S): 100000 CREAM TOPICAL at 14:01

## 2022-05-26 RX ADMIN — HEPARIN SODIUM 5000 UNIT(S): 5000 INJECTION INTRAVENOUS; SUBCUTANEOUS at 05:15

## 2022-05-26 RX ADMIN — Medication 1 APPLICATION(S): at 12:39

## 2022-05-26 RX ADMIN — Medication 1 MILLIGRAM(S): at 14:00

## 2022-05-26 NOTE — PROGRESS NOTE ADULT - SUBJECTIVE AND OBJECTIVE BOX
BREA BECKHAM     [facility]    Allergies    codeine (Hives)    Intolerances        PAST MEDICAL & SURGICAL HISTORY:  Dementia of frontal lobe type      Aphasic stroke      Diabetes mellitus      Respiratory failure      Hypertension      GERD (gastroesophageal reflux disease)      Constipation      Respiratory failure      CVA (cerebral vascular accident)      HTN (hypertension)      DM (diabetes mellitus)      Advanced dementia      COVID-19 virus detected      Quadriplegia      Pneumonia      Type II diabetes mellitus      Hx of appendectomy      Gastrostomy in place      Tracheostomy in place      Tracheostomy tube present      Feeding by G-tube          FAMILY HISTORY:  No pertinent family history in first degree relatives        Home Medications:  albuterol 90 mcg/inh inhalation aerosol with adapter: 2  inhaled every 6 hours (21 May 2022 21:16)  Bacid (LAC) oral tablet: 2 tab(s) by gastrostomy tube once a day (21 May 2022 21:16)  Basaglar KwikPen 100 units/mL subcutaneous solution: 36 unit(s) subcutaneous once a day (at bedtime) (21 May 2022 21:16)  Betadine 10% topical swab: cleanse right and left great toes (21 May 2022 21:16)  Carafate 1 g/10 mL oral suspension: 10 milliliter(s) by gastrostomy tube 4 times a day (before meals and at bedtime) for 14 days (Started 6/4/21) (21 May 2022 21:16)  chlorhexidine 0.12% mucous membrane liquid: 15 milliliter(s) mucous membrane 2 times a day (21 May 2022 21:16)  Eucerin topical cream: Apply topically to affected area once a day bilateral feet (21 May 2022 21:16)  folic acid 1 mg oral tablet: 1 tab(s) orally once a day (21 May 2022 21:16)  ipratropium-albuterol 0.5 mg-2.5 mg/3 mL inhalation solution: 3 milliliter(s) inhaled 4 times a day (21 May 2022 21:16)  LORazepam 1 mg oral tablet: 1 tab(s) by gastrostomy tube every 4 hours (21 May 2022 21:16)  methocarbamol 500 mg oral tablet: 1 tab(s) by gastrostomy tube 2 times a day (21 May 2022 21:16)  MiraLax oral powder for reconstitution:  (21 May 2022 23:14)  Multiple Vitamins oral tablet: 1 tab(s) orally once a day (21 May 2022 21:16)  omeprazole 20 mg oral delayed release capsule: orally 2 times a day (21 May 2022 23:14)  polyethylene glycol 3350 oral powder for reconstitution: 17 gram(s) by gastrostomy tube every 12 hours (21 May 2022 21:16)  senna 8.6 mg oral tablet: 3 tab(s) by gastrostomy tube once a day (at bedtime) (21 May 2022 21:16)  simethicone 80 mg oral tablet, chewable: 1 tab(s) by gastrostomy tube every 6 hours (21 May 2022 21:16)  Tylenol 325 mg oral tablet: 2 tab(s) by gastrostomy tube once a day; 60 minutes prior to dressing change  (21 May 2022 21:16)  Tylenol 325 mg oral tablet: 2 tab(s) by gastrostomy tube every 6 hours, As Needed (21 May 2022 21:16)  Zosyn:  (21 May 2022 23:14)      MEDICATIONS  (STANDING):  ceftazidime/avibactam IVPB 1.25 Gram(s) IV Intermittent every 12 hours  chlorhexidine 0.12% Liquid 15 milliLiter(s) Oral Mucosa every 12 hours  chlorhexidine 2% Cloths 1 Application(s) Topical <User Schedule>  chlorhexidine 4% Liquid 1 Application(s) Topical <User Schedule>  collagenase Ointment 1 Application(s) Topical daily  dextrose 5%. 1000 milliLiter(s) (100 mL/Hr) IV Continuous <Continuous>  dextrose 5%. 1000 milliLiter(s) (50 mL/Hr) IV Continuous <Continuous>  dextrose 50% Injectable 25 Gram(s) IV Push once  dextrose 50% Injectable 12.5 Gram(s) IV Push once  dextrose 50% Injectable 25 Gram(s) IV Push once  glucagon  Injectable 1 milliGRAM(s) IntraMuscular once  heparin   Injectable 5000 Unit(s) SubCutaneous every 8 hours  insulin glargine Injectable (LANTUS) 5 Unit(s) SubCutaneous every morning  insulin lispro (ADMELOG) corrective regimen sliding scale   SubCutaneous every 6 hours  iron sucrose IVPB 200 milliGRAM(s) IV Intermittent every 24 hours  lactobacillus acidophilus 1 Tablet(s) Oral daily  LORazepam     Tablet 1 milliGRAM(s) Oral every 4 hours  mineral oil/petrolatum Hydrophilic Ointment 1 Application(s) Topical daily  nystatin Powder 1 Application(s) Topical three times a day  pantoprazole  Injectable 40 milliGRAM(s) IV Push daily  potassium chloride   Powder 40 milliEquivalent(s) Oral once  povidone iodine 10% Solution 1 Application(s) Topical daily    MEDICATIONS  (PRN):  acetaminophen    Suspension .. 650 milliGRAM(s) Oral every 6 hours PRN Temp greater or equal to 38C (100.4F), Mild Pain (1 - 3)  albuterol/ipratropium for Nebulization 3 milliLiter(s) Nebulizer every 6 hours PRN Shortness of Breath and/or Wheezing  dextrose Oral Gel 15 Gram(s) Oral once PRN Blood Glucose LESS THAN 70 milliGRAM(s)/deciliter  LORazepam   Injectable 1 milliGRAM(s) IV Push every 6 hours PRN agitation/vent dyssynchrony  sodium chloride 0.9% lock flush 10 milliLiter(s) IV Push every 1 hour PRN Pre/post blood products, medications, blood draw, and to maintain line patency      Diet, NPO with Tube Feed:   Tube Feeding Modality: Gastrostomy  Glucerna 1.5 Horacio  Continuous  Starting Tube Feed Rate mL per Hour: 20  Until Goal Tube Feed Rate (mL per Hour): 20  Tube Feed Duration (in Hours): 24  Tube Feed Start Time: 12:00 (05-24-22 @ 10:55) [Pending Verification By Attending]  Diet, NPO with Tube Feed:   Tube Feeding Modality: Gastrostomy  Jevity 1.5 Horacio  Total Volume for 24 Hours (mL): 480  Continuous  Starting Tube Feed Rate mL per Hour: 20  Until Goal Tube Feed Rate (mL per Hour): 20  Tube Feed Duration (in Hours): 24  Tube Feed Start Time: 18:30 (05-22-22 @ 18:06) [Active]  Diet, NPO with Tube Feed:   Tube Feeding Modality: Gastrostomy  Glucerna 1.5 Horacio  Total Volume for 24 Hours (mL): 1200  Continuous  Starting Tube Feed Rate mL per Hour: 20  Increase Tube Feed Rate by (mL): 10     Every 6 hours  Until Goal Tube Feed Rate (mL per Hour): 60  Tube Feed Duration (in Hours): 20  Tube Feed Start Time: 00:00 (05-22-22 @ 11:42) [Pending Verification By Attending]          Vital Signs Last 24 Hrs  T(C): 36.4 (26 May 2022 08:22), Max: 36.8 (25 May 2022 12:31)  T(F): 97.6 (26 May 2022 08:22), Max: 98.3 (25 May 2022 12:31)  HR: 64 (26 May 2022 10:00) (60 - 103)  BP: 118/50 (26 May 2022 10:00) (94/57 - 134/57)  BP(mean): 70 (26 May 2022 10:00) (67 - 87)  RR: 17 (26 May 2022 10:00) (12 - 26)  SpO2: 100% (26 May 2022 10:00) (96% - 100%)      05-25-22 @ 07:01  -  05-26-22 @ 07:00  --------------------------------------------------------  IN: 1370 mL / OUT: 480 mL / NET: 890 mL        Mode: AC/ CMV (Assist Control/ Continuous Mandatory Ventilation), RR (machine): 26, TV (machine): 400, FiO2: 40, PEEP: 5, ITime: 0.9, MAP: 16, PIP: 33      LABS:                        7.5    11.28 )-----------( 243      ( 26 May 2022 06:04 )             25.8     05-26    142  |  106  |  28<H>  ----------------------------<  181<H>  3.4<L>   |  26  |  1.28    Ca    7.9<L>      26 May 2022 06:04  Phos  4.1     05-25  Mg     3.1     05-25    TPro  5.3<L>  /  Alb  1.7<L>  /  TBili  0.6  /  DBili  x   /  AST  23  /  ALT  65<H>  /  AlkPhos  120  05-26              WBC:  WBC Count: 11.28 K/uL (05-26 @ 06:04)  WBC Count: 11.83 K/uL (05-25 @ 06:00)  WBC Count: 14.23 K/uL (05-24 @ 06:56)  WBC Count: 13.05 K/uL (05-23 @ 06:44)      MICROBIOLOGY:  RECENT CULTURES:  05-25 .Body Fluid Pleural Fluid XXXX   polymorphonuclear leukocytes seen  No organisms seen  by cytocentrifuge   Testing in progress    05-23 Trach Asp Tracheal Aspirate Pseudomonas aeruginosa (Carbapenem Resistant)   Moderate polymorphonuclear leukocytes per low power field  No Squamous epithelial cells per low power field  Moderate Gram Negative Rods per oil power field   Numerous Mixed gram negative rods including  Numerous Pseudomonas aeruginosa (Carbapenem Resistant)  Normal Respiratory Elvira absent    05-21 .Blood Blood-Peripheral XXXX XXXX   No growth to date.    05-21 Clean Catch Clean Catch (Midstream) XXXX XXXX   <10,000 CFU/mL Normal Urogenital Elvira                    Sodium:  Sodium, Serum: 142 mmol/L (05-26 @ 06:04)  Sodium, Serum: 143 mmol/L (05-25 @ 06:00)  Sodium, Serum: 144 mmol/L (05-24 @ 06:56)  Sodium, Serum: 143 mmol/L (05-23 @ 06:44)      1.28 mg/dL 05-26 @ 06:04  1.41 mg/dL 05-25 @ 06:00  1.48 mg/dL 05-24 @ 06:56  1.48 mg/dL 05-23 @ 06:44      Hemoglobin:  Hemoglobin: 7.5 g/dL (05-26 @ 06:04)  Hemoglobin: 7.8 g/dL (05-25 @ 06:00)  Hemoglobin: 8.0 g/dL (05-24 @ 06:56)  Hemoglobin: 8.2 g/dL (05-23 @ 20:09)  Hemoglobin: 6.8 g/dL (05-23 @ 06:44)      Platelets: Platelet Count - Automated: 243 K/uL (05-26 @ 06:04)  Platelet Count - Automated: 255 K/uL (05-25 @ 06:00)  Platelet Count - Automated: 291 K/uL (05-24 @ 06:56)  Platelet Count - Automated: 317 K/uL (05-23 @ 06:44)      LIVER FUNCTIONS - ( 26 May 2022 06:04 )  Alb: 1.7 g/dL / Pro: 5.3 g/dL / ALK PHOS: 120 U/L / ALT: 65 U/L DA / AST: 23 U/L / GGT: x                 RADIOLOGY & ADDITIONAL STUDIES:      MICROBIOLOGY:  RECENT CULTURES:  05-25 .Body Fluid Pleural Fluid XXXX   polymorphonuclear leukocytes seen  No organisms seen  by cytocentrifuge   Testing in progress    05-23 Trach Asp Tracheal Aspirate Pseudomonas aeruginosa (Carbapenem Resistant)   Moderate polymorphonuclear leukocytes per low power field  No Squamous epithelial cells per low power field  Moderate Gram Negative Rods per oil power field   Numerous Mixed gram negative rods including  Numerous Pseudomonas aeruginosa (Carbapenem Resistant)  Normal Respiratory Elvira absent    05-21 .Blood Blood-Peripheral XXXX XXXX   No growth to date.    05-21 Clean Catch Clean Catch (Midstream) XXXX XXXX   <10,000 CFU/mL Normal Urogenital Elvira

## 2022-05-26 NOTE — PROGRESS NOTE ADULT - ASSESSMENT
REVIEW OF SYMPTOMS      Able to give (reliable) ROS  NO     PHYSICAL EXAM    HEENT Unremarkable  atraumatic   RESP Fair air entry EXP prolonged    Harsh breath sound Resp distres mild   CARDIAC S1 S2 No S3     NO JVD    ABDOMEN SOFT BS PRESENT NOT DISTENDED No hepatosplenomegaly   PEDAL EDEMA present No calf tenderness  NO rash       AGE/SEX.   76 f    DOA.  5/21/2022     CC .  5/21/2022 AOC RESP FAILURE     PMH .  pmh Hosp stay given zosyn 4/21  pmh Pneumonia    pmh HTN,   pmh DM,   pmh CVA,   pmh  chronic respiratory failure   pmh s/p trach, PEG,   pmh cardiac arrest      MAIN ISSUES  .  SEVERE HYPOXIC RESP FAILURE poa   VAP poa   SHOCK poa   BL PL EFFSNS   5/25/2022 Thoracentesis  CHF  PULM HYTN   ANEMIA 5/23/2022 Hb 6.8   ELEVATED LFTS       COVID/ICU/CODE STATUS.                       COVID  STATUS. 5/21/2022 scv2 (-)           ICU STAY. 5/21  GOC.  5/24/2022 full code    BEST PRACTICE ISSUES.                                                  HEAD OF BED ELEVATION. Yes  DVT PROPHYLAXIS.     5/21/2022 hpsc    SQUIRES PROPHYLAXIS.5/22/2022 protonix 40   INFECTION PROPHYLAXIS.   5/21/2022 Ch;lorhexidine .12%   5/21/2022 Chlorhexidine 2%                                                                                         DIET.   5/22/2022 jevity 1.5 20 gt     VITALS/PO/IO/VENT/DRIPS.   5/26/2022 afeb 63 100/50   5/26/2022 12p ac 26/400/5/.3        PROBLEM DATA/ASSESSMENT RECOMMENDATIONS (A/R).    HEMODYNAMICS.   Monitor and optimize bp   Target MAP to miguel  65 (+)    RESP.   Monitor and optimize  po   Target po to remain 90-95%    ABG.  5/23/2022 5a ac 26/400/10/70%  755/34/81   5/22/2022 5a vent 90% 744/41/117   5/21/2022 8p 100% vent 739/50/48    PMH/PSH PROBLEMS.  Management continued/modified as indicated      VENT MANAGEMENT.   HOB elevation  Target Pplat 30 (-)  Target PO 90-95%  Target pH 730 (+)  Daily spontaneous breathing trials   Daily sedation vacation       DIARRHEA.  5/23/2022 c diff (-)     PICC poa   5/23/2022 Ordered to dc picc    INFECTION SOURCE DD.   INFECTION A/R.   Has ho crp   Has ho iv abio within last 90 d  Is on bsab   id eval Dr BRITTANY Brantley appreciated   Started on ceftazidime avibactam 1.25x2 5/22/2022        INFECTION AUGUST.  W 5/21-5/22-5/23-5/24-5/25-5/26/2022  w 20 - 13 - 13-14 - 11-11.2  ua 5/21/2022 w 3-5   ct ch 5/21/2022 new bl ggo patchy cpnsolidatnand septal thickening of uncertain etiology   No change bl effsns l greater   Near complete consolidatn both lower loobes     MICROBIO.  Rvp 5/21/2022 (-)   urine c 5/21 (-)   blod c 5/21 (-)   sputum 5/23 numerous pseudomonas   c diff 5/23/2022 c diff (-)   mrsa 5/24 (-)   PAST MICROBIO.  4/24/2022 sputum CRABAPENEM RESISTANT PSEUDOMONAS  ABIO.  5/22/2022 ceftazidime avibactam 1.25x2   5/21/2022 linezolid    SEVERE HYPOXIC RESP FAILURE poa.   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  V duplx 5/23/2022 (-)     COPD.  5/22/2022 duoneb     PLEURAL EFFSNS.  echo 9/8/2021   n lvsf  mild dd  n rvsf  rvsp 51   ct 4/22/2022 ct ch 4/21/2022   bl effsns increased in size cw 1/2022 5/25/2022 1.5 l clear pl effsn removed left side IR  5/25/2022 g 183 l 144/381 .37 p 3.4/5.3 .64 p 23 l 36   5/25/2022l pl fluid  lymph pred exudate   await cyto       MI.  5/23-5/24/2022 Tr 258-155     CHF.  echo 9/8/2021   n lvsf   mild dd   n rvsf   rvsp 51   bnp 5/21/2022 bnp [id number] 5/22/2022 cardio consulted      ANEMIA.   Hb 5/21-5/22-5/23-5/24-5/25-5/26/2022   Hb 8 - 7.2 - 6.8 - 8 - 7.8-7.5   monitor  target hb 7 (+)     TRANSFUSION.  5/23/2022 1 u prbc    RYAN.  Na 5/21/2022 Na 140  CO2 5/21/2022 CO2 30   Cr 5/21-5/22-5/23-5/24-5/25-5/26/2022   Cr 1.6 - 1.5- 1.4 - 1.4-1.4 - 1.2     monitor     ELEVATED LFTS.  LFTS 5/22-5/23-5/24-5/26/2022  - 136-124-120   - 104-47-23   - 149 - 105-65   5/22/2022 us abd fibrofatty liver dis borderline gbw thick tr perichole fluid   5/22/2022  hep panel (-)       TIME SPENT   Over 36  minutes aggregate critical care time spent on encounter; activities included   direct patient care, counseling and/or coordinating care reviewing notes, lab data/ imaging , discussion with multidisciplinary team/ patient  /family and explaining in detail risks, benefits, alternatives  of the recommendations     CHAPINCITO GUIDRY 76 f [facility] S 5/21/2022

## 2022-05-26 NOTE — PROGRESS NOTE ADULT - ASSESSMENT
This is an 80 y/o F with PMH of HTN, DM type 2, Cardiac Arrest, CVA, COPD, Chronic Respiratory Failure Vent Dependant s/p trach & PEG, Multiple Hospital Admissions for Aspiration & vent associated PNA, COVID-19 Infection, Anemia with GI blood loss, GERD, and Advanced Dementia who was sent from [facility] facility for acute respiratory distress.     Sepsis 2/2 VAP/PNA c/b pleural effusions  Pleural Effusions: parapneumonic vs. empyema  Acute on Chronic RF, vented   - pt w/ hx of multiple admissions  - pt p/w acute RF, likely recurrent VAP  - prior sputum cx reviewed. 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  - s/p IR guided thoracentesis on 5/25    --pleural fluid cx NGTD  - repeat 5/25 CT chest w/ improved aeration  Plan:  C/w avycaz for now, plan for x7-10 day course  D/c linezolid, no evidence of MRSA as causative organism  C/w vent management per ICU  Pulm toilet  Maintain aspiration precautions  Trend temps/WBC    Infectious Diseases will continue to follow. Please call with any questions.   Andressa Brantley M.D.  [facility], Division of Infectious Diseases [phone]

## 2022-05-26 NOTE — PROGRESS NOTE ADULT - ASSESSMENT
78 y/o F with PMH of HTN, DM type 2, Cardiac Arrest, CVA, COPD, Chronic Respiratory Failure Vent Dependant s/p trach & PEG, Multiple Hospital Admissions for Aspiration & vent associated PNA, COVID-19 Infection, Anemia with GI blood loss, GERD, and Advanced Dementia presented with acute respiratory distress.    ct chest reviewed  vs noted  labs reviewed    HCP Marciano -  - pt is full code  on emp ABX - broad spectrum for poss PNA  cvs rx regimen  bronchodilators  oral and skin care  assist with needs - ADL  monitor VS and HD  CT imaging reviewed  Old records reviewed  suction PRN  trach care  peg care  nutritional optimization  decubitus prevention

## 2022-05-26 NOTE — PROGRESS NOTE ADULT - ASSESSMENT
79F HTN, DM2, COPD, Chronic Respiratory Failure on Trach to Vent admitted for Acute on Chronic Respiratory Failure.     Acute on Chronic Respiratory Failure / Septic Shock   Aspiration vs. VAP vs ; History of CRE and Psuedomonas; Remains Afebrile;   S/P IR Thoracentesis (L) 1500cc and will follow up cultures; MRSA negative  IV Ceftazidime  ID and Pulmonary consults appreciated     Anemia of Chronic Disease with Iron Deficiency  Has been evaluated by GI and Hematology on prior admissions  She has Anemia of Chronic Disease but could also have intermittent GI Bleeding; Occult Blood Negative  S/P 1U PRBC Transfusion and IV Venofer    Acute Renal Failure   Improving - Most likely due to Sepsis   Will continue gentle hydration and maintain BP   Renally dose and monitor BMP and electrolytes     HTN  Hold all BP lower agents    DM2  FS and on ISS to maintain BS <180    Diet  Tube Feeds    DVT Prophylaxis  Heparin    Disposition  Full Code   Discharge planning pending culture results and antibiotic regimen

## 2022-05-26 NOTE — PROGRESS NOTE ADULT - SUBJECTIVE AND OBJECTIVE BOX
Subjective: Patient seen and examined.  Had Left Thoracentesis yesterday with 1.5L removed. CT done to rule out tracheal injury.     MEDICATIONS  (STANDING):  ceftazidime/avibactam IVPB 1.25 Gram(s) IV Intermittent every 12 hours  chlorhexidine 0.12% Liquid 15 milliLiter(s) Oral Mucosa every 12 hours  chlorhexidine 2% Cloths 1 Application(s) Topical <User Schedule>  chlorhexidine 4% Liquid 1 Application(s) Topical <User Schedule>  collagenase Ointment 1 Application(s) Topical daily  dextrose 5%. 1000 milliLiter(s) (100 mL/Hr) IV Continuous <Continuous>  dextrose 5%. 1000 milliLiter(s) (50 mL/Hr) IV Continuous <Continuous>  dextrose 50% Injectable 25 Gram(s) IV Push once  dextrose 50% Injectable 12.5 Gram(s) IV Push once  dextrose 50% Injectable 25 Gram(s) IV Push once  glucagon  Injectable 1 milliGRAM(s) IntraMuscular once  heparin   Injectable 5000 Unit(s) SubCutaneous every 8 hours  insulin glargine Injectable (LANTUS) 5 Unit(s) SubCutaneous every morning  insulin lispro (ADMELOG) corrective regimen sliding scale   SubCutaneous every 6 hours  iron sucrose IVPB 200 milliGRAM(s) IV Intermittent every 24 hours  lactobacillus acidophilus 1 Tablet(s) Oral daily  LORazepam     Tablet 1 milliGRAM(s) Oral every 4 hours  mineral oil/petrolatum Hydrophilic Ointment 1 Application(s) Topical daily  nystatin Powder 1 Application(s) Topical three times a day  pantoprazole  Injectable 40 milliGRAM(s) IV Push daily  povidone iodine 10% Solution 1 Application(s) Topical daily    MEDICATIONS  (PRN):  acetaminophen    Suspension .. 650 milliGRAM(s) Oral every 6 hours PRN Temp greater or equal to 38C (100.4F), Mild Pain (1 - 3)  albuterol/ipratropium for Nebulization 3 milliLiter(s) Nebulizer every 6 hours PRN Shortness of Breath and/or Wheezing  dextrose Oral Gel 15 Gram(s) Oral once PRN Blood Glucose LESS THAN 70 milliGRAM(s)/deciliter  LORazepam   Injectable 1 milliGRAM(s) IV Push every 6 hours PRN agitation/vent dyssynchrony  sodium chloride 0.9% lock flush 10 milliLiter(s) IV Push every 1 hour PRN Pre/post blood products, medications, blood draw, and to maintain line patency      Allergies    codeine (Hives)    Intolerances        Vital Signs Last 24 Hrs  T(C): 36.4 (26 May 2022 08:22), Max: 36.8 (25 May 2022 12:31)  T(F): 97.6 (26 May 2022 08:22), Max: 98.3 (25 May 2022 12:31)  HR: 64 (26 May 2022 10:00) (60 - 103)  BP: 118/50 (26 May 2022 10:00) (94/57 - 134/57)  BP(mean): 70 (26 May 2022 10:00) (67 - 87)  RR: 17 (26 May 2022 10:00) (12 - 26)  SpO2: 100% (26 May 2022 10:00) (96% - 100%)    PHYSICAL EXAM:  GENERAL: Laying in bed with eyes open and unresponsive   HEAD:  Atraumatic, Normocephalic  ENMT: Moist mucous membranes,   NECK: Supple, No JVD, Normal thyroid; Trach to Vent   CHEST/LUNG: Clear to auscultation bilaterally; No rales, rhonchi, wheezing, or rubs  HEART: Regular rate and rhythm; No murmurs, rubs, or gallops  ABDOMEN: Distended and PEG Tube +   EXTREMITIES:  2+ Peripheral Pulses, No clubbing, cyanosis, or edema      LABS:                        7.5    11.28 )-----------( 243      ( 26 May 2022 06:04 )             25.8     26 May 2022 06:04    142    |  106    |  28     ----------------------------<  181    3.4     |  26     |  1.28     Ca    7.9        26 May 2022 06:04    TPro  5.3    /  Alb  1.7    /  TBili  0.6    /  DBili  x      /  AST  23     /  ALT  65     /  AlkPhos  120    26 May 2022 06:04        CAPILLARY BLOOD GLUCOSE      POCT Blood Glucose.: 153 mg/dL (26 May 2022 08:30)  POCT Blood Glucose.: 173 mg/dL (26 May 2022 05:38)  POCT Blood Glucose.: 137 mg/dL (25 May 2022 23:31)  POCT Blood Glucose.: 148 mg/dL (25 May 2022 17:28)  POCT Blood Glucose.: 177 mg/dL (25 May 2022 11:45)      RADIOLOGY & ADDITIONAL TESTS:    Imaging Personally Reviewed:  [ ] YES     Consultant(s) Notes Reviewed:      Care Discussed with Consultants/Other Providers:    Advanced Directives: [ ] DNR  [ ] No feeding tube  [ ] MOLST in chart  [ ] MOLST completed today  [ ] Unknown

## 2022-05-26 NOTE — PROGRESS NOTE ADULT - SUBJECTIVE AND OBJECTIVE BOX
[facility], Division of Infectious Diseases  MOIZ Lora Y. Patel, S. Shah, G. [facility]  [phone]    Name: BREA BECHKAM  Age: 76y  Gender: Female  MRN: [id number]    Interval History:  Patient seen and examined at bedside this morning  No acute overnight events. Afebrile  Notes reviewed    Antibiotics:  ceftazidime/avibactam IVPB 1.25 Gram(s) IV Intermittent every 12 hours      Medications:  acetaminophen    Suspension .. 650 milliGRAM(s) Oral every 6 hours PRN  albuterol/ipratropium for Nebulization 3 milliLiter(s) Nebulizer every 6 hours PRN  ceftazidime/avibactam IVPB 1.25 Gram(s) IV Intermittent every 12 hours  chlorhexidine 0.12% Liquid 15 milliLiter(s) Oral Mucosa every 12 hours  chlorhexidine 2% Cloths 1 Application(s) Topical <User Schedule>  chlorhexidine 4% Liquid 1 Application(s) Topical <User Schedule>  collagenase Ointment 1 Application(s) Topical daily  dextrose 5%. 1000 milliLiter(s) IV Continuous <Continuous>  dextrose 5%. 1000 milliLiter(s) IV Continuous <Continuous>  dextrose 50% Injectable 25 Gram(s) IV Push once  dextrose 50% Injectable 12.5 Gram(s) IV Push once  dextrose 50% Injectable 25 Gram(s) IV Push once  dextrose Oral Gel 15 Gram(s) Oral once PRN  glucagon  Injectable 1 milliGRAM(s) IntraMuscular once  heparin   Injectable 5000 Unit(s) SubCutaneous every 8 hours  insulin glargine Injectable (LANTUS) 5 Unit(s) SubCutaneous every morning  insulin lispro (ADMELOG) corrective regimen sliding scale   SubCutaneous every 6 hours  iron sucrose IVPB 200 milliGRAM(s) IV Intermittent every 24 hours  lactobacillus acidophilus 1 Tablet(s) Oral daily  LORazepam     Tablet 1 milliGRAM(s) Oral every 4 hours  LORazepam   Injectable 1 milliGRAM(s) IV Push every 6 hours PRN  mineral oil/petrolatum Hydrophilic Ointment 1 Application(s) Topical daily  nystatin Powder 1 Application(s) Topical three times a day  pantoprazole  Injectable 40 milliGRAM(s) IV Push daily  povidone iodine 10% Solution 1 Application(s) Topical daily  sodium chloride 0.9% lock flush 10 milliLiter(s) IV Push every 1 hour PRN      Review of Systems:  unable to obtain    Allergies: codeine (Hives)    For details regarding the patient's past medical history, social history, family history, and other miscellaneous elements, please refer the initial infectious diseases consultation and/or the admitting history and physical examination for this admission.    Objective:  Vitals:   T(C): 36.4 (05-26-22 @ 08:22), Max: 36.8 (05-25-22 @ 12:31)  HR: 62 (05-26-22 @ 08:06) (60 - 103)  BP: 94/57 (05-26-22 @ 08:03) (94/57 - 136/63)  RR: 15 (05-26-22 @ 08:03) (12 - 33)  SpO2: 100% (05-26-22 @ 08:06) (96% - 100%)    Physical Examination:  General: chronically ill appearing W  HEENT: NC/AT  Neck: trach to vent  Cardio: S1, S2 heard, RRR, no murmurs  Resp: MV breath sounds  Abd: soft, NT, ND, PGT in place  Neuro: nonverbal  Ext: no edema or cyanosis  Skin: warm, dry    Laboratory Studies:  CBC:                       7.5    11.28 )-----------( 243      ( 26 May 2022 06:04 )             25.8     CMP: 05-26    142  |  106  |  28<H>  ----------------------------<  181<H>  3.4<L>   |  26  |  1.28    Ca    7.9<L>      26 May 2022 06:04  Phos  4.1     05-25  Mg     3.1     05-25    TPro  5.3<L>  /  Alb  1.7<L>  /  TBili  0.6  /  DBili  x   /  AST  23  /  ALT  65<H>  /  AlkPhos  120  05-26    LIVER FUNCTIONS - ( 26 May 2022 06:04 )  Alb: 1.7 g/dL / Pro: 5.3 g/dL / ALK PHOS: 120 U/L / ALT: 65 U/L DA / AST: 23 U/L / GGT: x               Microbiology: reviewed    Culture - Fungal, Body Fluid (collected 05-25-22 @ 10:39)  Source: .Body Fluid Pleural Fluid  Preliminary Report (05-26-22 @ 06:53):    Testing in progress    Culture - Body Fluid with Gram Stain (collected 05-25-22 @ 10:39)  Source: .Body Fluid Pleural Fluid  Gram Stain (05-25-22 @ 19:50):    polymorphonuclear leukocytes seen    No organisms seen    by cytocentrifuge    Culture - Acid Fast - Body Fluid w/Smear (collected 05-25-22 @ 10:39)  Source: .Body Fluid Pleural Fluid    Culture - Sputum (collected 05-23-22 @ 11:20)  Source: Trach Asp Tracheal Aspirate  Gram Stain (05-23-22 @ 21:29):    Moderate polymorphonuclear leukocytes per low power field    No Squamous epithelial cells per low power field    Moderate Gram Negative Rods per oil power field  Final Report (05-25-22 @ 16:10):    Numerous Mixed gram negative rods including    Numerous Pseudomonas aeruginosa (Carbapenem Resistant)    Normal Respiratory Elvira absent  Organism: Pseudomonas aeruginosa (Carbapenem Resistant) (05-25-22 @ 16:10)  Organism: Pseudomonas aeruginosa (Carbapenem Resistant) (05-25-22 @ 16:10)      -  Amikacin: S <=16      -  Aztreonam: S <=4      -  Cefepime: S 4      -  Ceftazidime: S 8      -  Ciprofloxacin: S <=0.25      -  Gentamicin: S <=2      -  Imipenem: R >8      -  Levofloxacin: S <=0.5      -  Meropenem: R 8      -  Piperacillin/Tazobactam: S 16      -  Tobramycin: S <=2      Method Type: PRATIK    Culture - Blood (collected 05-21-22 @ 21:57)  Source: .Blood Blood-Peripheral  Preliminary Report (05-23-22 @ 14:01):    No growth to date.    Culture - Blood (collected 05-21-22 @ 21:57)  Source: .Blood Blood-Peripheral  Preliminary Report (05-23-22 @ 14:01):    No growth to date.    Culture - Urine (collected 05-21-22 @ 20:51)  Source: Clean Catch Clean Catch (Midstream)  Final Report (05-23-22 @ 10:12):    <10,000 CFU/mL Normal Urogenital Elvira        Radiology: reviewed    < from: CT Chest No Cont (05.25.22 @ 16:23) >    ACC: [id number] EXAM:  CT CHEST                          PROCEDURE DATE:  05/25/2022          INTERPRETATION:  HISTORY: Admitting Dxs: J18.9 SHORTNESS OF BREATH    EXAMINATION: CT CHEST was performed without IV contrast.    COMPARISON: 5/21/2022.    FINDINGS:    Image quality degraded due to extensive patient motion.    AIRWAYS, LUNGS, PLEURA: Tracheostomy tube in place.    Interval decrease in size of left pleural effusion, now small. Moderate   right pleural effusion without significant change.Interval improvement   of left lower lobe aeration. Left lower lobe patchy opacities. Bilateral   upper lobe ground-glass opacities appear slightly improved. Interlobular   septal thickening.    No pneumothorax.    MEDIASTINUM: Normal heart size. Coronary atherosclerosis. No pericardial   effusion. Thoracic aorta normal caliber.  No large mediastinal lymph   nodes. Right IJ central venous catheter terminates in the lower SVC. No   pneumomediastinum. The esophagus is diffusely patulous.    IMAGED ABDOMEN: Unremarkable.    SOFT TISSUES: Unremarkable.    BONES: L2 compression fracture as on prior exam.      IMPRESSION:.    Image quality degraded due to extensive patient motion. No evidence of   tracheal injury on this limited examination.    No evidence of pneumomediastinum or pneumothorax.    Pulmonary edema appears improved compared to 5/21/2022 on the basis of   decreased left pleural effusion and bilateral upper lobe ground-glass   opacities. Unchanged moderate right pleural effusion.    Interval improvement in aeration of the left lower lobe. Persistent   patchy left lower lobe opacities may be on the basis of edema or   infection.    --- End of Report ---             SHAI KENT MD; Attending Radiologist  This document has been electronically signed. May 25 2022  4:42PM    < end of copied text >

## 2022-05-26 NOTE — PROGRESS NOTE ADULT - ASSESSMENT
77 y/o F with a h/o HTN, DM type 2, Cardiac Arrest, CVA, COPD, Chronic Respiratory Failure Vent Dependant s/p trach & PEG, Multiple Hospital Admissions for Aspiration & VAP, advanced dementia, with:    Acute hypoxemic respiratory failure, septic shock, pseudomonas VAP, pleural effusion, RYAN.    -  77 y/o F with a h/o HTN, DM type 2, Cardiac Arrest, CVA, COPD, Chronic Respiratory Failure Vent Dependant s/p trach & PEG, Multiple Hospital Admissions for Aspiration & VAP, advanced dementia, with:    Acute hypoxemic respiratory failure, septic shock, pseudomonas VAP, pleural effusion, RYAN.    - actively titrating vent settings to maintain SpO2 > 92% and adequate minute ventilation  - FiO2 weaned to 40%  - s/p left thoracentesis with 1500cc yellow fluid removed, pleural fluid studies appear to indicate a transudate, culture pending  - initially with concern for tracheal perforation given CXR findings, CT chest obtained and does not show this  - off vasopressors, will continue to hold antihypertensives given soft BPs  - monitor hemodynamics closely and maintain a MAP > 65  - blood and urine cultures neg for growth thus far  - continue empiric Avycaz and Zyvox  - ID input appreciated  - RYAN likely secondary to ischemic ATN, optimize end-organ perfusion as indicated  - trend BUN/Cr, monitor lytes, acid-base balance, and UOP  - no indication for urgent HD at this time

## 2022-05-26 NOTE — PROGRESS NOTE ADULT - SUBJECTIVE AND OBJECTIVE BOX
Chief Complaint: Respiratory distress    Interval Events: No events overnight.    Review of Systems:  Unable to obtain    Physical Exam:  Vital Signs Last 24 Hrs  T(C): 36.4 (26 May 2022 04:00), Max: 37 (25 May 2022 08:15)  T(F): 97.6 (26 May 2022 04:00), Max: 98.6 (25 May 2022 08:15)  HR: 67 (26 May 2022 06:00) (60 - 103)  BP: 104/52 (26 May 2022 06:00) (100/55 - 136/63)  BP(mean): 68 (26 May 2022 06:00) (67 - 87)  RR: 26 (26 May 2022 06:00) (12 - 33)  SpO2: 99% (26 May 2022 06:00) (96% - 100%)  General: NAD  HEENT: Trach  Neck: No JVD, no carotid bruit  Lungs: Coarse bilaterally  CV: RRR, nl S1/S2, no M/R/G  Abdomen: S/NT/ND, +BS  Extremities: No LE edema, no cyanosis  Neuro: AAOx0  Skin: No rash    Labs:             05-26    142  |  106  |  28<H>  ----------------------------<  181<H>  3.4<L>   |  26  |  1.28    Ca    7.9<L>      26 May 2022 06:04  Phos  4.1     05-25  Mg     3.1     05-25    TPro  5.3<L>  /  Alb  1.7<L>  /  TBili  0.6  /  DBili  x   /  AST  23  /  ALT  65<H>  /  AlkPhos  120  05-26                        7.5    11.28 )-----------( 243      ( 26 May 2022 06:04 )             25.8         Telemetry: Sinus rhythm

## 2022-05-26 NOTE — PROGRESS NOTE ADULT - SUBJECTIVE AND OBJECTIVE BOX
Patient is a 76y old  Female who presents with a chief complaint of Acute respiratory distress. (25 May 2022 14:50)      BRIEF HOSPITAL COURSE: 75 y/o F with a h/o HTN, DM type 2, Cardiac Arrest, CVA, COPD, Chronic Respiratory Failure Vent Dependant s/p trach & PEG, Multiple Hospital Admissions for Aspiration & VAP, advanced dementia, admitted on 5/21 from [facility] facility for acute respiratory distress and hypoxemia. Hospital course complicated by septic shock. Pseudomonas aeruginosa growing in tracheal aspirate culture.    Events last 24 hours: Patient remains on full vent support. S/p left thoracentesis. No evidence of tracheal perforation on CT chest. Hemodynamically intact off vasopressors.         PAST MEDICAL & SURGICAL HISTORY:  Dementia of frontal lobe type      Aphasic stroke      Diabetes mellitus      Respiratory failure      Hypertension      GERD (gastroesophageal reflux disease)      Constipation      Respiratory failure      CVA (cerebral vascular accident)      HTN (hypertension)      DM (diabetes mellitus)      Advanced dementia      COVID-19 virus detected      Quadriplegia      Pneumonia      Type II diabetes mellitus      Hx of appendectomy      Gastrostomy in place      Tracheostomy in place      Tracheostomy tube present      Feeding by G-tube          Review of Systems:  Unable to obtain secondary to AMS.      Medications:  ceftazidime/avibactam IVPB 1.25 Gram(s) IV Intermittent every 12 hours  linezolid  IVPB 600 milliGRAM(s) IV Intermittent every 12 hours  albuterol/ipratropium for Nebulization 3 milliLiter(s) Nebulizer every 6 hours PRN  acetaminophen    Suspension .. 650 milliGRAM(s) Oral every 6 hours PRN  LORazepam     Tablet 1 milliGRAM(s) Oral every 4 hours  LORazepam   Injectable 1 milliGRAM(s) IV Push every 6 hours PRN  heparin   Injectable 5000 Unit(s) SubCutaneous every 8 hours  pantoprazole  Injectable 40 milliGRAM(s) IV Push daily  dextrose 50% Injectable 25 Gram(s) IV Push once  dextrose 50% Injectable 12.5 Gram(s) IV Push once  dextrose 50% Injectable 25 Gram(s) IV Push once  dextrose Oral Gel 15 Gram(s) Oral once PRN  glucagon  Injectable 1 milliGRAM(s) IntraMuscular once  insulin glargine Injectable (LANTUS) 5 Unit(s) SubCutaneous every morning  insulin lispro (ADMELOG) corrective regimen sliding scale   SubCutaneous every 6 hours  dextrose 5%. 1000 milliLiter(s) IV Continuous <Continuous>  dextrose 5%. 1000 milliLiter(s) IV Continuous <Continuous>  iron sucrose IVPB 200 milliGRAM(s) IV Intermittent every 24 hours  sodium chloride 0.9% lock flush 10 milliLiter(s) IV Push every 1 hour PRN  chlorhexidine 0.12% Liquid 15 milliLiter(s) Oral Mucosa every 12 hours  chlorhexidine 2% Cloths 1 Application(s) Topical <User Schedule>  chlorhexidine 4% Liquid 1 Application(s) Topical <User Schedule>  collagenase Ointment 1 Application(s) Topical daily  mineral oil/petrolatum Hydrophilic Ointment 1 Application(s) Topical daily  nystatin Powder 1 Application(s) Topical three times a day  povidone iodine 10% Solution 1 Application(s) Topical daily  lactobacillus acidophilus 1 Tablet(s) Oral daily      Mode: AC/ CMV (Assist Control/ Continuous Mandatory Ventilation)  RR (machine): 26  TV (machine): 400  FiO2: 50  PEEP: 5  ITime: 0.9  MAP: 19  PIP: 38      ICU Vital Signs Last 24 Hrs  T(C): 36.4 (26 May 2022 04:00), Max: 37 (25 May 2022 08:15)  T(F): 97.6 (26 May 2022 04:00), Max: 98.6 (25 May 2022 08:15)  HR: 81 (26 May 2022 04:46) (60 - 100)  BP: 111/52 (26 May 2022 04:00) (100/55 - 138/63)  BP(mean): 70 (26 May 2022 04:00) (67 - 87)  ABP: --  ABP(mean): --  RR: 26 (26 May 2022 04:00) (12 - 33)  SpO2: 97% (26 May 2022 04:46) (96% - 100%)          I&O's Detail    24 May 2022 07:01  -  25 May 2022 07:00  --------------------------------------------------------  IN:    IV PiggyBack: 400 mL    Jevity 1.5: 480 mL  Total IN: 880 mL    OUT:    Voided (mL): 200 mL  Total OUT: 200 mL    Total NET: 680 mL      25 May 2022 07:01  -  26 May 2022 06:07  --------------------------------------------------------  IN:    IV PiggyBack: 500 mL    Jevity 1.5: 400 mL  Total IN: 900 mL    OUT:    Voided (mL): 480 mL  Total OUT: 480 mL    Total NET: 420 mL            LABS:                        7.5    11.28 )-----------( 243      ( 26 May 2022 06:04 )             25.8     05-25    143  |  106  |  38<H>  ----------------------------<  204<H>  3.6   |  28  |  1.41<H>    Ca    8.1<L>      25 May 2022 06:00  Phos  4.1     05-25  Mg     3.1     05-25    TPro  5.7<L>  /  Alb  1.8<L>  /  TBili  0.6  /  DBili  x   /  AST  36  /  ALT  87<H>  /  AlkPhos  131<H>  05-25          CAPILLARY BLOOD GLUCOSE      POCT Blood Glucose.: 173 mg/dL (26 May 2022 05:38)    PT/INR - ( 24 May 2022 06:56 )   PT: 14.2 sec;   INR: 1.20 ratio             CULTURES:  C Diff by PCR Result: NotDetec (05-23-22 @ 12:22)  Culture Results:   Numerous Mixed gram negative rods including  Numerous Pseudomonas aeruginosa (Carbapenem Resistant)  Normal Respiratory Elvira absent (05-23-22 @ 11:20)  Culture Results:   No growth to date. (05-21-22 @ 21:57)  Culture Results:   No growth to date. (05-21-22 @ 21:57)  Rapid RVP Result: NotDetec (05-21-22 @ 20:51)  Culture Results:   <10,000 CFU/mL Normal Urogenital Elvira (05-21-22 @ 20:51)        Physical Examination:    General: No acute distress.  somnolent but will arouse, trach to vent    HEENT: Pupils equal, reactive to light.  Symmetric.    PULM: diminished at bases bilaterally, no significant sputum production    CVS: Regular rate and rhythm, no murmurs, rubs, or gallops    ABD: Soft, nondistended, nontender, normoactive bowel sounds, (+)PEG    EXT: No edema, nontender    SKIN: Warm and well perfused, no rashes noted.    NEURO: opens eyes, nonverbal, does not follow commands        RADIOLOGY:     < from: CT Chest No Cont (05.25.22 @ 16:23) >  FINDINGS:    Image quality degraded due to extensive patient motion.    AIRWAYS, LUNGS, PLEURA: Tracheostomy tube in place.    Interval decrease in size of left pleural effusion, now small. Moderate   right pleural effusion without significant change.Interval improvement   of left lower lobe aeration. Left lower lobe patchy opacities. Bilateral   upper lobe ground-glass opacities appear slightly improved. Interlobular   septal thickening.    No pneumothorax.    MEDIASTINUM: Normal heart size. Coronary atherosclerosis. No pericardial   effusion. Thoracic aorta normal caliber.  No large mediastinal lymph   nodes. Right IJ central venous catheter terminates in the lower SVC. No   pneumomediastinum. The esophagus is diffusely patulous.    IMAGED ABDOMEN: Unremarkable.    SOFT TISSUES: Unremarkable.    BONES: L2 compression fracture as on prior exam.      IMPRESSION:.    Image quality degraded due to extensive patient motion. No evidence of   tracheal injury on this limited examination.    No evidence of pneumomediastinum or pneumothorax.    Pulmonary edema appears improved compared to 5/21/2022 on the basis of   decreased left pleural effusion and bilateral upper lobe ground-glass   opacities. Unchanged moderate right pleural effusion.    Interval improvement in aeration of the left lower lobe. Persistent   patchy left lower lobe opacities may be on the basis of edema or   infection.

## 2022-05-26 NOTE — PROGRESS NOTE ADULT - SUBJECTIVE AND OBJECTIVE BOX
Date/Time Patient Seen:  		  Referring MD:   Data Reviewed	       Patient is a 76y old  Female who presents with a chief complaint of Acute respiratory distress. (26 May 2022 06:07)      Subjective/HPI     PAST MEDICAL & SURGICAL HISTORY:  Dementia of frontal lobe type    Aphasic stroke    Diabetes mellitus    Respiratory failure    Hypertension    GERD (gastroesophageal reflux disease)    Constipation    Respiratory failure    CVA (cerebral vascular accident)    HTN (hypertension)    DM (diabetes mellitus)    Advanced dementia    COVID-19 virus detected    Quadriplegia    Pneumonia    Type II diabetes mellitus    Hx of appendectomy    Gastrostomy in place    Tracheostomy in place    Tracheostomy tube present    Feeding by G-tube          Medication list         MEDICATIONS  (STANDING):  ceftazidime/avibactam IVPB 1.25 Gram(s) IV Intermittent every 12 hours  chlorhexidine 0.12% Liquid 15 milliLiter(s) Oral Mucosa every 12 hours  chlorhexidine 2% Cloths 1 Application(s) Topical <User Schedule>  chlorhexidine 4% Liquid 1 Application(s) Topical <User Schedule>  collagenase Ointment 1 Application(s) Topical daily  dextrose 5%. 1000 milliLiter(s) (100 mL/Hr) IV Continuous <Continuous>  dextrose 5%. 1000 milliLiter(s) (50 mL/Hr) IV Continuous <Continuous>  dextrose 50% Injectable 25 Gram(s) IV Push once  dextrose 50% Injectable 12.5 Gram(s) IV Push once  dextrose 50% Injectable 25 Gram(s) IV Push once  glucagon  Injectable 1 milliGRAM(s) IntraMuscular once  heparin   Injectable 5000 Unit(s) SubCutaneous every 8 hours  insulin glargine Injectable (LANTUS) 5 Unit(s) SubCutaneous every morning  insulin lispro (ADMELOG) corrective regimen sliding scale   SubCutaneous every 6 hours  iron sucrose IVPB 200 milliGRAM(s) IV Intermittent every 24 hours  lactobacillus acidophilus 1 Tablet(s) Oral daily  linezolid  IVPB 600 milliGRAM(s) IV Intermittent every 12 hours  LORazepam     Tablet 1 milliGRAM(s) Oral every 4 hours  mineral oil/petrolatum Hydrophilic Ointment 1 Application(s) Topical daily  nystatin Powder 1 Application(s) Topical three times a day  pantoprazole  Injectable 40 milliGRAM(s) IV Push daily  povidone iodine 10% Solution 1 Application(s) Topical daily    MEDICATIONS  (PRN):  acetaminophen    Suspension .. 650 milliGRAM(s) Oral every 6 hours PRN Temp greater or equal to 38C (100.4F), Mild Pain (1 - 3)  albuterol/ipratropium for Nebulization 3 milliLiter(s) Nebulizer every 6 hours PRN Shortness of Breath and/or Wheezing  dextrose Oral Gel 15 Gram(s) Oral once PRN Blood Glucose LESS THAN 70 milliGRAM(s)/deciliter  LORazepam   Injectable 1 milliGRAM(s) IV Push every 6 hours PRN agitation/vent dyssynchrony  sodium chloride 0.9% lock flush 10 milliLiter(s) IV Push every 1 hour PRN Pre/post blood products, medications, blood draw, and to maintain line patency         Vitals log        ICU Vital Signs Last 24 Hrs  T(C): 36.4 (26 May 2022 04:00), Max: 37 (25 May 2022 08:15)  T(F): 97.6 (26 May 2022 04:00), Max: 98.6 (25 May 2022 08:15)  HR: 67 (26 May 2022 06:00) (60 - 103)  BP: 104/52 (26 May 2022 06:00) (100/55 - 138/63)  BP(mean): 68 (26 May 2022 06:00) (67 - 87)  ABP: --  ABP(mean): --  RR: 26 (26 May 2022 06:00) (12 - 33)  SpO2: 99% (26 May 2022 06:00) (96% - 100%)       Mode: AC/ CMV (Assist Control/ Continuous Mandatory Ventilation)  RR (machine): 26  TV (machine): 400  FiO2: 50  PEEP: 5  ITime: 0.9  MAP: 19  PIP: 38      Input and Output:  I&O's Detail    24 May 2022 07:01  -  25 May 2022 07:00  --------------------------------------------------------  IN:    IV PiggyBack: 400 mL    Jevity 1.5: 480 mL  Total IN: 880 mL    OUT:    Voided (mL): 200 mL  Total OUT: 200 mL    Total NET: 680 mL      25 May 2022 07:01  -  26 May 2022 06:23  --------------------------------------------------------  IN:    IV PiggyBack: 500 mL    Jevity 1.5: 400 mL  Total IN: 900 mL    OUT:    Voided (mL): 480 mL  Total OUT: 480 mL    Total NET: 420 mL          Lab Data                        7.5    11.28 )-----------( 243      ( 26 May 2022 06:04 )             25.8     05-25    143  |  106  |  38<H>  ----------------------------<  204<H>  3.6   |  28  |  1.41<H>    Ca    8.1<L>      25 May 2022 06:00  Phos  4.1     05-25  Mg     3.1     05-25    TPro  5.7<L>  /  Alb  1.8<L>  /  TBili  0.6  /  DBili  x   /  AST  36  /  ALT  87<H>  /  AlkPhos  131<H>  05-25            Review of Systems	      Objective     Physical Examination    heart s1s2  lung dc BS  abd soft      Pertinent Lab findings & Imaging      Annalise:  NO   Adequate UO     I&O's Detail    24 May 2022 07:01  -  25 May 2022 07:00  --------------------------------------------------------  IN:    IV PiggyBack: 400 mL    Jevity 1.5: 480 mL  Total IN: 880 mL    OUT:    Voided (mL): 200 mL  Total OUT: 200 mL    Total NET: 680 mL      25 May 2022 07:01  -  26 May 2022 06:23  --------------------------------------------------------  IN:    IV PiggyBack: 500 mL    Jevity 1.5: 400 mL  Total IN: 900 mL    OUT:    Voided (mL): 480 mL  Total OUT: 480 mL    Total NET: 420 mL               Discussed with:     Cultures:	        Radiology

## 2022-05-27 LAB
ANION GAP SERPL CALC-SCNC: 8 MMOL/L — SIGNIFICANT CHANGE UP (ref 5–17)
BUN SERPL-MCNC: 26 MG/DL — HIGH (ref 7–23)
CALCIUM SERPL-MCNC: 8.3 MG/DL — LOW (ref 8.4–10.5)
CHLORIDE SERPL-SCNC: 106 MMOL/L — SIGNIFICANT CHANGE UP (ref 96–108)
CO2 SERPL-SCNC: 25 MMOL/L — SIGNIFICANT CHANGE UP (ref 22–31)
CREAT SERPL-MCNC: 1.32 MG/DL — HIGH (ref 0.5–1.3)
CULTURE RESULTS: SIGNIFICANT CHANGE UP
CULTURE RESULTS: SIGNIFICANT CHANGE UP
EGFR: 41 ML/MIN/1.73M2 — LOW
GLUCOSE SERPL-MCNC: 153 MG/DL — HIGH (ref 70–99)
HCT VFR BLD CALC: 28.1 % — LOW (ref 34.5–45)
HGB BLD-MCNC: 8.4 G/DL — LOW (ref 11.5–15.5)
LEGIONELLA AG UR QL: NEGATIVE — SIGNIFICANT CHANGE UP
MCHC RBC-ENTMCNC: 25.5 PG — LOW (ref 27–34)
MCHC RBC-ENTMCNC: 29.9 GM/DL — LOW (ref 32–36)
MCV RBC AUTO: 85.2 FL — SIGNIFICANT CHANGE UP (ref 80–100)
NRBC # BLD: 0 /100 WBCS — SIGNIFICANT CHANGE UP (ref 0–0)
PLATELET # BLD AUTO: 242 K/UL — SIGNIFICANT CHANGE UP (ref 150–400)
POTASSIUM SERPL-MCNC: 4 MMOL/L — SIGNIFICANT CHANGE UP (ref 3.5–5.3)
POTASSIUM SERPL-SCNC: 4 MMOL/L — SIGNIFICANT CHANGE UP (ref 3.5–5.3)
RBC # BLD: 3.3 M/UL — LOW (ref 3.8–5.2)
RBC # FLD: 20.7 % — HIGH (ref 10.3–14.5)
SODIUM SERPL-SCNC: 139 MMOL/L — SIGNIFICANT CHANGE UP (ref 135–145)
SPECIMEN SOURCE: SIGNIFICANT CHANGE UP
SPECIMEN SOURCE: SIGNIFICANT CHANGE UP
WBC # BLD: 11.79 K/UL — HIGH (ref 3.8–10.5)
WBC # FLD AUTO: 11.79 K/UL — HIGH (ref 3.8–10.5)

## 2022-05-27 PROCEDURE — 99233 SBSQ HOSP IP/OBS HIGH 50: CPT

## 2022-05-27 RX ADMIN — Medication 1 APPLICATION(S): at 11:13

## 2022-05-27 RX ADMIN — Medication 1 MILLIGRAM(S): at 17:09

## 2022-05-27 RX ADMIN — CHLORHEXIDINE GLUCONATE 15 MILLILITER(S): 213 SOLUTION TOPICAL at 05:46

## 2022-05-27 RX ADMIN — Medication 1 MILLIGRAM(S): at 05:54

## 2022-05-27 RX ADMIN — HEPARIN SODIUM 5000 UNIT(S): 5000 INJECTION INTRAVENOUS; SUBCUTANEOUS at 13:06

## 2022-05-27 RX ADMIN — Medication 1 MILLIGRAM(S): at 13:06

## 2022-05-27 RX ADMIN — PANTOPRAZOLE SODIUM 40 MILLIGRAM(S): 20 TABLET, DELAYED RELEASE ORAL at 11:14

## 2022-05-27 RX ADMIN — CHLORHEXIDINE GLUCONATE 1 APPLICATION(S): 213 SOLUTION TOPICAL at 05:46

## 2022-05-27 RX ADMIN — Medication 2: at 23:17

## 2022-05-27 RX ADMIN — HEPARIN SODIUM 5000 UNIT(S): 5000 INJECTION INTRAVENOUS; SUBCUTANEOUS at 05:54

## 2022-05-27 RX ADMIN — Medication 1 MILLIGRAM(S): at 21:47

## 2022-05-27 RX ADMIN — Medication 1 MILLIGRAM(S): at 01:31

## 2022-05-27 RX ADMIN — Medication 1 MILLIGRAM(S): at 09:12

## 2022-05-27 RX ADMIN — Medication 2: at 17:17

## 2022-05-27 RX ADMIN — CHLORHEXIDINE GLUCONATE 15 MILLILITER(S): 213 SOLUTION TOPICAL at 17:09

## 2022-05-27 RX ADMIN — Medication 1 APPLICATION(S): at 11:45

## 2022-05-27 RX ADMIN — Medication 1 TABLET(S): at 11:12

## 2022-05-27 RX ADMIN — NYSTATIN CREAM 1 APPLICATION(S): 100000 CREAM TOPICAL at 06:23

## 2022-05-27 RX ADMIN — Medication 1 MILLIGRAM(S): at 10:36

## 2022-05-27 RX ADMIN — IRON SUCROSE 110 MILLIGRAM(S): 20 INJECTION, SOLUTION INTRAVENOUS at 10:07

## 2022-05-27 RX ADMIN — Medication 2: at 05:53

## 2022-05-27 RX ADMIN — INSULIN GLARGINE 5 UNIT(S): 100 INJECTION, SOLUTION SUBCUTANEOUS at 07:42

## 2022-05-27 RX ADMIN — HEPARIN SODIUM 5000 UNIT(S): 5000 INJECTION INTRAVENOUS; SUBCUTANEOUS at 21:47

## 2022-05-27 RX ADMIN — NYSTATIN CREAM 1 APPLICATION(S): 100000 CREAM TOPICAL at 22:04

## 2022-05-27 RX ADMIN — NYSTATIN CREAM 1 APPLICATION(S): 100000 CREAM TOPICAL at 13:06

## 2022-05-27 RX ADMIN — Medication 1 MILLIGRAM(S): at 03:12

## 2022-05-27 NOTE — PHARMACOTHERAPY INTERVENTION NOTE - COMMENTS
Patient had active order for Ceftazidime/Avibactam 1.25gm IVPB q12h. Per ID MD, antibiotic duration of therapy 7 days. Order is continuous with no stop date. Spoke to ID physician on case, gave verbal order to future discontinue medication at 7 days course of  treatment. 
Antimicrobial Stewardship Program  ASP: restricted antibiotic   CrCl = 30.3ml/min  Ceftazidime/Avibactam 1.25gm IVPB q12h (renal dosing)  ID physician on case: Dr. Haylee Umanzor

## 2022-05-27 NOTE — PROGRESS NOTE ADULT - SUBJECTIVE AND OBJECTIVE BOX
Subjective: Patient seen and examined. No overnight events.     MEDICATIONS  (STANDING):  ceftazidime/avibactam IVPB 1.25 Gram(s) IV Intermittent every 12 hours  chlorhexidine 0.12% Liquid 15 milliLiter(s) Oral Mucosa every 12 hours  chlorhexidine 2% Cloths 1 Application(s) Topical <User Schedule>  chlorhexidine 4% Liquid 1 Application(s) Topical <User Schedule>  collagenase Ointment 1 Application(s) Topical daily  dextrose 5%. 1000 milliLiter(s) (50 mL/Hr) IV Continuous <Continuous>  dextrose 5%. 1000 milliLiter(s) (100 mL/Hr) IV Continuous <Continuous>  dextrose 50% Injectable 25 Gram(s) IV Push once  dextrose 50% Injectable 12.5 Gram(s) IV Push once  dextrose 50% Injectable 25 Gram(s) IV Push once  glucagon  Injectable 1 milliGRAM(s) IntraMuscular once  heparin   Injectable 5000 Unit(s) SubCutaneous every 8 hours  insulin glargine Injectable (LANTUS) 5 Unit(s) SubCutaneous every morning  insulin lispro (ADMELOG) corrective regimen sliding scale   SubCutaneous every 6 hours  iron sucrose IVPB 200 milliGRAM(s) IV Intermittent every 24 hours  lactobacillus acidophilus 1 Tablet(s) Oral daily  LORazepam     Tablet 1 milliGRAM(s) Oral every 4 hours  mineral oil/petrolatum Hydrophilic Ointment 1 Application(s) Topical daily  nystatin Powder 1 Application(s) Topical three times a day  pantoprazole  Injectable 40 milliGRAM(s) IV Push daily  povidone iodine 10% Solution 1 Application(s) Topical daily    MEDICATIONS  (PRN):  acetaminophen    Suspension .. 650 milliGRAM(s) Oral every 6 hours PRN Temp greater or equal to 38C (100.4F), Mild Pain (1 - 3)  albuterol/ipratropium for Nebulization 3 milliLiter(s) Nebulizer every 6 hours PRN Shortness of Breath and/or Wheezing  dextrose Oral Gel 15 Gram(s) Oral once PRN Blood Glucose LESS THAN 70 milliGRAM(s)/deciliter  LORazepam   Injectable 1 milliGRAM(s) IV Push every 6 hours PRN agitation/vent dyssynchrony  sodium chloride 0.9% lock flush 10 milliLiter(s) IV Push every 1 hour PRN Pre/post blood products, medications, blood draw, and to maintain line patency      Allergies    codeine (Hives)    Intolerances        Vital Signs Last 24 Hrs  T(C): 37.1 (27 May 2022 03:22), Max: 37.1 (27 May 2022 03:22)  T(F): 98.7 (27 May 2022 03:22), Max: 98.7 (27 May 2022 03:22)  HR: 82 (27 May 2022 08:04) (62 - 99)  BP: 110/54 (27 May 2022 07:00) (94/53 - 150/70)  BP(mean): 72 (27 May 2022 07:00) (67 - 94)  RR: 19 (27 May 2022 07:00) (0 - 32)  SpO2: 96% (27 May 2022 08:04) (92% - 100%)    PHYSICAL EXAM:  GENERAL: Laying in bed with eyes open and unresponsive   HEAD:  Atraumatic, Normocephalic  ENMT: Moist mucous membranes,   NECK: Supple, No JVD, Normal thyroid; Trach to Vent   CHEST/LUNG: Clear to auscultation bilaterally; No rales, rhonchi, wheezing, or rubs  HEART: Regular rate and rhythm; No murmurs, rubs, or gallops  ABDOMEN: Distended and PEG Tube +   EXTREMITIES:  2+ Peripheral Pulses, No clubbing, cyanosis, or edema      LABS:                        8.4    11.79 )-----------( 242      ( 27 May 2022 06:00 )             28.1     27 May 2022 06:00    139    |  106    |  26     ----------------------------<  153    4.0     |  25     |  1.32     Ca    8.3        27 May 2022 06:00          CAPILLARY BLOOD GLUCOSE      POCT Blood Glucose.: 160 mg/dL (27 May 2022 05:52)  POCT Blood Glucose.: 129 mg/dL (26 May 2022 23:03)  POCT Blood Glucose.: 124 mg/dL (26 May 2022 17:26)  POCT Blood Glucose.: 111 mg/dL (26 May 2022 12:34)  POCT Blood Glucose.: 153 mg/dL (26 May 2022 08:30)      RADIOLOGY & ADDITIONAL TESTS:    Imaging Personally Reviewed:  [ ] YES     Consultant(s) Notes Reviewed:      Care Discussed with Consultants/Other Providers:    Advanced Directives: [ ] DNR  [ ] No feeding tube  [ ] MOLST in chart  [ ] MOLST completed today  [ ] Unknown

## 2022-05-27 NOTE — CHART NOTE - NSCHARTNOTEFT_GEN_A_CORE
Assessment:     Pt seen for nutrition follow-up. Per H&P, pt is a "80 y/o F with PMH of HTN, DM type 2, Cardiac Arrest, CVA, COPD, Chronic Respiratory Failure Vent Dependant s/p trach & PEG, Multiple Hospital Admissions for Aspiration & vent associated PNA, COVID-19 Infection, Anemia with GI blood loss, GERD, and Advanced Dementia who was sent from [facility] facility for acute respiratory distress with unknown onset, no reported fever or chills. On arrival to ED she was found with tachypnea & dropping of her SPO2 on same vent settings, CT chest without contrast revealed worse B/L PNA & B/L pleural effusion compared with her CT one month ago. Patient was discharged from hospital 3 weeks ago after admission for a similar condition, No history could obtained given her status."    Nutrition consult previously ordered for pressure ulcer stage II or >. Pt admitted with stage II to sacrum, stage III to R buttock, SDTI to R lateral foot and L under foot.  Pt with hx trach/PEG.  Per transfer documents, pt was receiving Glucerna 1.5 to goal 60ml/hr x 20hrs (was providing 1200ml TV formula, 1800kcal, 99g protein; also receiving 250ml free water q 6hrs, +25ml hourly flushes). Pt previously with abdominal distention earlier in admission; was receiving tube feeds at low rate of 20ml/hr. Tube feed now currently active for Glucerna 1.5, starting at 20ml/hr increase by 10ml every 6 hours until goal 60ml/hr x 20hrs is reached (this will provide 1200 ml TV formula, 1800kcal based on 31kcal/kg IBW, 99g protein based on 1.7g/kg IBW, 912ml free water from formula). Pt currently tolerating TF well, not yet at goal rate; rate currently at 40ml/hr. Recommend addition of standard 25ml/hr free water flushes. CBW on admission 117#. Previous adm weight of 119#. No edema noted. +BM 5/25. Recommend continuing to increase TF rate until goal rate of 60ml x 20 hours is reached. RD to follow closely and will continue to monitor pt's nutrition status.    Factors impacting intake: [ ] none [ ] nausea  [ ] vomiting [ ] diarrhea [ ] constipation  [ ]chewing problems [ ] swallowing issues  [X] other: peg    Diet Prescription: Diet, NPO with Tube Feed:   Tube Feeding Modality: Gastrostomy  Glucerna 1.5 Horacio  Total Volume for 24 Hours (mL): 1200  Continuous  Starting Tube Feed Rate {mL per Hour}: 20  Increase Tube Feed Rate by (mL): 10     Every 6 hours  Until Goal Tube Feed Rate (mL per Hour): 60  Tube Feed Duration (in Hours): 20  Tube Feed Start Time: 00:00 (05-22-22 @ 11:42)    Intake: tolerating TF well at 40ml/hr, not yet at goal rate    Current Weight: Weight (kg): 53.5 (05-21 @ 20:03)  % Weight Change    Pertinent Medications: MEDICATIONS  (STANDING):  ceftazidime/avibactam IVPB 1.25 Gram(s) IV Intermittent every 12 hours  chlorhexidine 0.12% Liquid 15 milliLiter(s) Oral Mucosa every 12 hours  chlorhexidine 2% Cloths 1 Application(s) Topical <User Schedule>  chlorhexidine 4% Liquid 1 Application(s) Topical <User Schedule>  collagenase Ointment 1 Application(s) Topical daily  dextrose 5%. 1000 milliLiter(s) (50 mL/Hr) IV Continuous <Continuous>  dextrose 5%. 1000 milliLiter(s) (100 mL/Hr) IV Continuous <Continuous>  dextrose 50% Injectable 25 Gram(s) IV Push once  dextrose 50% Injectable 12.5 Gram(s) IV Push once  dextrose 50% Injectable 25 Gram(s) IV Push once  glucagon  Injectable 1 milliGRAM(s) IntraMuscular once  heparin   Injectable 5000 Unit(s) SubCutaneous every 8 hours  insulin glargine Injectable (LANTUS) 5 Unit(s) SubCutaneous every morning  insulin lispro (ADMELOG) corrective regimen sliding scale   SubCutaneous every 6 hours  lactobacillus acidophilus 1 Tablet(s) Oral daily  LORazepam     Tablet 1 milliGRAM(s) Oral every 4 hours  mineral oil/petrolatum Hydrophilic Ointment 1 Application(s) Topical daily  nystatin Powder 1 Application(s) Topical three times a day  pantoprazole  Injectable 40 milliGRAM(s) IV Push daily  povidone iodine 10% Solution 1 Application(s) Topical daily    MEDICATIONS  (PRN):  acetaminophen    Suspension .. 650 milliGRAM(s) Oral every 6 hours PRN Temp greater or equal to 38C (100.4F), Mild Pain (1 - 3)  albuterol/ipratropium for Nebulization 3 milliLiter(s) Nebulizer every 6 hours PRN Shortness of Breath and/or Wheezing  dextrose Oral Gel 15 Gram(s) Oral once PRN Blood Glucose LESS THAN 70 milliGRAM(s)/deciliter  LORazepam   Injectable 1 milliGRAM(s) IV Push every 6 hours PRN agitation/vent dyssynchrony  sodium chloride 0.9% lock flush 10 milliLiter(s) IV Push every 1 hour PRN Pre/post blood products, medications, blood draw, and to maintain line patency    Pertinent Labs: 05-27 Na139 mmol/L Glu 153 mg/dL<H> K+ 4.0 mmol/L Cr  1.32 mg/dL<H> BUN 26 mg/dL<H> 05-25 Phos 4.1 mg/dL 05-26 Alb 1.7 g/dL<L>    CAPILLARY BLOOD GLUCOSE  POCT Blood Glucose.: 149 mg/dL (27 May 2022 11:32)  POCT Blood Glucose.: 160 mg/dL (27 May 2022 05:52)  POCT Blood Glucose.: 129 mg/dL (26 May 2022 23:03)  POCT Blood Glucose.: 124 mg/dL (26 May 2022 17:26)  POCT Blood Glucose.: 111 mg/dL (26 May 2022 12:34)    Skin: pressure ulcers; stage II to sacrum, stage III to R buttock, SDTI to R lateral foot and L under foot    Estimated Needs:   [X] no change since previous assessment  [ ] recalculated:     Previous Nutrition Diagnosis:   [ ] Inadequate Energy Intake [ ]Inadequate Oral Intake [ ] Excessive Energy Intake   [ ] Underweight [X] Increased Nutrient Needs [ ] Overweight/Obesity   [ ] Altered GI Function [ ] Unintended Weight Loss [ ] Food & Nutrition Related Knowledge Deficit [ ] Malnutrition     Nutrition Diagnosis is [X] ongoing  [ ] resolved [ ] not applicable     New Nutrition Diagnosis: [ ] not applicable       Interventions: Continue nutrition care plan, tube feed via peg of Glucerna 1.5; increase until goal rate of 60ml/hr x 20hrs is reached; add 25ml/hr free water flushes  Recommend  [ ] Change Diet To:  [ ] Nutrition Supplement  [ ] Nutrition Support  [ ] Other:     Monitoring and Evaluation:   [ ] PO intake [ x ] Tolerance to EN prescription [ x ] weights [ x ] labs[ x ] follow up per protocol  [ ] other: Assessment:     Pt seen for nutrition follow-up. Per H&P, pt is a "80 y/o F with PMH of HTN, DM type 2, Cardiac Arrest, CVA, COPD, Chronic Respiratory Failure Vent Dependant s/p trach & PEG, Multiple Hospital Admissions for Aspiration & vent associated PNA, COVID-19 Infection, Anemia with GI blood loss, GERD, and Advanced Dementia who was sent from [facility] facility for acute respiratory distress with unknown onset, no reported fever or chills. On arrival to ED she was found with tachypnea & dropping of her SPO2 on same vent settings, CT chest without contrast revealed worse B/L PNA & B/L pleural effusion compared with her CT one month ago. Patient was discharged from hospital 3 weeks ago after admission for a similar condition, No history could obtained given her status."    Nutrition consult previously ordered for pressure ulcer stage II or >. Pt admitted with stage II to sacrum, stage III to R buttock, SDTI to R lateral foot and L under foot.  Pt with hx trach/PEG.  Per transfer documents, pt was receiving Glucerna 1.5 to goal 60ml/hr x 20hrs (was providing 1200ml TV formula, 1800kcal, 99g protein; also receiving 250ml free water q 6hrs, +25ml hourly flushes). Pt previously with abdominal distention earlier in admission; was receiving tube feeds at low rate of 20ml/hr. Pt s/p thoracentesis with removal of 1500 cc on 5/25. Tube feed now currently active for Glucerna 1.5, starting at 20ml/hr increase by 10ml every 6 hours until goal 60ml/hr x 20hrs is reached (this will provide 1200 ml TV formula, 1800kcal based on 31kcal/kg IBW, 99g protein based on 1.7g/kg IBW, 912ml free water from formula). Pt currently tolerating TF well, not yet at goal rate; rate currently at 40ml/hr. Recommend addition of standard 25ml/hr free water flushes. CBW on admission 117#. Previous adm weight of 119#. No edema noted. +BM 5/25. Recommend continuing to increase TF rate until goal rate of 60ml x 20 hours is reached. RD to follow closely and will continue to monitor pt's nutrition status.    Factors impacting intake: [ ] none [ ] nausea  [ ] vomiting [ ] diarrhea [ ] constipation  [ ]chewing problems [ ] swallowing issues  [X] other: peg    Diet Prescription: Diet, NPO with Tube Feed:   Tube Feeding Modality: Gastrostomy  Glucerna 1.5 Horacio  Total Volume for 24 Hours (mL): 1200  Continuous  Starting Tube Feed Rate {mL per Hour}: 20  Increase Tube Feed Rate by (mL): 10     Every 6 hours  Until Goal Tube Feed Rate (mL per Hour): 60  Tube Feed Duration (in Hours): 20  Tube Feed Start Time: 00:00 (05-22-22 @ 11:42)    Intake: tolerating TF well at 40ml/hr, not yet at goal rate    Current Weight: Weight (kg): 53.5 (05-21 @ 20:03)  % Weight Change    Pertinent Medications: MEDICATIONS  (STANDING):  ceftazidime/avibactam IVPB 1.25 Gram(s) IV Intermittent every 12 hours  chlorhexidine 0.12% Liquid 15 milliLiter(s) Oral Mucosa every 12 hours  chlorhexidine 2% Cloths 1 Application(s) Topical <User Schedule>  chlorhexidine 4% Liquid 1 Application(s) Topical <User Schedule>  collagenase Ointment 1 Application(s) Topical daily  dextrose 5%. 1000 milliLiter(s) (50 mL/Hr) IV Continuous <Continuous>  dextrose 5%. 1000 milliLiter(s) (100 mL/Hr) IV Continuous <Continuous>  dextrose 50% Injectable 25 Gram(s) IV Push once  dextrose 50% Injectable 12.5 Gram(s) IV Push once  dextrose 50% Injectable 25 Gram(s) IV Push once  glucagon  Injectable 1 milliGRAM(s) IntraMuscular once  heparin   Injectable 5000 Unit(s) SubCutaneous every 8 hours  insulin glargine Injectable (LANTUS) 5 Unit(s) SubCutaneous every morning  insulin lispro (ADMELOG) corrective regimen sliding scale   SubCutaneous every 6 hours  lactobacillus acidophilus 1 Tablet(s) Oral daily  LORazepam     Tablet 1 milliGRAM(s) Oral every 4 hours  mineral oil/petrolatum Hydrophilic Ointment 1 Application(s) Topical daily  nystatin Powder 1 Application(s) Topical three times a day  pantoprazole  Injectable 40 milliGRAM(s) IV Push daily  povidone iodine 10% Solution 1 Application(s) Topical daily    MEDICATIONS  (PRN):  acetaminophen    Suspension .. 650 milliGRAM(s) Oral every 6 hours PRN Temp greater or equal to 38C (100.4F), Mild Pain (1 - 3)  albuterol/ipratropium for Nebulization 3 milliLiter(s) Nebulizer every 6 hours PRN Shortness of Breath and/or Wheezing  dextrose Oral Gel 15 Gram(s) Oral once PRN Blood Glucose LESS THAN 70 milliGRAM(s)/deciliter  LORazepam   Injectable 1 milliGRAM(s) IV Push every 6 hours PRN agitation/vent dyssynchrony  sodium chloride 0.9% lock flush 10 milliLiter(s) IV Push every 1 hour PRN Pre/post blood products, medications, blood draw, and to maintain line patency    Pertinent Labs: 05-27 Na139 mmol/L Glu 153 mg/dL<H> K+ 4.0 mmol/L Cr  1.32 mg/dL<H> BUN 26 mg/dL<H> 05-25 Phos 4.1 mg/dL 05-26 Alb 1.7 g/dL<L>    CAPILLARY BLOOD GLUCOSE  POCT Blood Glucose.: 149 mg/dL (27 May 2022 11:32)  POCT Blood Glucose.: 160 mg/dL (27 May 2022 05:52)  POCT Blood Glucose.: 129 mg/dL (26 May 2022 23:03)  POCT Blood Glucose.: 124 mg/dL (26 May 2022 17:26)  POCT Blood Glucose.: 111 mg/dL (26 May 2022 12:34)    Skin: pressure ulcers; stage II to sacrum, stage III to R buttock, SDTI to R lateral foot and L under foot    Estimated Needs:   [X] no change since previous assessment  [ ] recalculated:     Previous Nutrition Diagnosis:   [ ] Inadequate Energy Intake [ ]Inadequate Oral Intake [ ] Excessive Energy Intake   [ ] Underweight [X] Increased Nutrient Needs [ ] Overweight/Obesity   [ ] Altered GI Function [ ] Unintended Weight Loss [ ] Food & Nutrition Related Knowledge Deficit [ ] Malnutrition     Nutrition Diagnosis is [X] ongoing  [ ] resolved [ ] not applicable     New Nutrition Diagnosis: [ ] not applicable       Interventions: Continue nutrition care plan, tube feed via peg of Glucerna 1.5; increase until goal rate of 60ml/hr x 20hrs is reached; add 25ml/hr free water flushes  Recommend  [ ] Change Diet To:  [ ] Nutrition Supplement  [ ] Nutrition Support  [ ] Other:     Monitoring and Evaluation:   [ ] PO intake [ x ] Tolerance to EN prescription [ x ] weights [ x ] labs[ x ] follow up per protocol  [ ] other:

## 2022-05-27 NOTE — PROGRESS NOTE ADULT - SUBJECTIVE AND OBJECTIVE BOX
ICU Progress Note    HPI:    S:    Pt seen and examined  HD #  Pt here for      Allergies    codeine (Hives)    Intolerances        MEDICATIONS  (STANDING):  ceftazidime/avibactam IVPB 1.25 Gram(s) IV Intermittent every 12 hours  chlorhexidine 0.12% Liquid 15 milliLiter(s) Oral Mucosa every 12 hours  chlorhexidine 2% Cloths 1 Application(s) Topical <User Schedule>  chlorhexidine 4% Liquid 1 Application(s) Topical <User Schedule>  collagenase Ointment 1 Application(s) Topical daily  dextrose 5%. 1000 milliLiter(s) (50 mL/Hr) IV Continuous <Continuous>  dextrose 5%. 1000 milliLiter(s) (100 mL/Hr) IV Continuous <Continuous>  dextrose 50% Injectable 25 Gram(s) IV Push once  dextrose 50% Injectable 12.5 Gram(s) IV Push once  dextrose 50% Injectable 25 Gram(s) IV Push once  glucagon  Injectable 1 milliGRAM(s) IntraMuscular once  heparin   Injectable 5000 Unit(s) SubCutaneous every 8 hours  insulin glargine Injectable (LANTUS) 5 Unit(s) SubCutaneous every morning  insulin lispro (ADMELOG) corrective regimen sliding scale   SubCutaneous every 6 hours  iron sucrose IVPB 200 milliGRAM(s) IV Intermittent every 24 hours  lactobacillus acidophilus 1 Tablet(s) Oral daily  LORazepam     Tablet 1 milliGRAM(s) Oral every 4 hours  mineral oil/petrolatum Hydrophilic Ointment 1 Application(s) Topical daily  nystatin Powder 1 Application(s) Topical three times a day  pantoprazole  Injectable 40 milliGRAM(s) IV Push daily  povidone iodine 10% Solution 1 Application(s) Topical daily    MEDICATIONS  (PRN):  acetaminophen    Suspension .. 650 milliGRAM(s) Oral every 6 hours PRN Temp greater or equal to 38C (100.4F), Mild Pain (1 - 3)  albuterol/ipratropium for Nebulization 3 milliLiter(s) Nebulizer every 6 hours PRN Shortness of Breath and/or Wheezing  dextrose Oral Gel 15 Gram(s) Oral once PRN Blood Glucose LESS THAN 70 milliGRAM(s)/deciliter  LORazepam   Injectable 1 milliGRAM(s) IV Push every 6 hours PRN agitation/vent dyssynchrony  sodium chloride 0.9% lock flush 10 milliLiter(s) IV Push every 1 hour PRN Pre/post blood products, medications, blood draw, and to maintain line patency      Drug Dosing Weight  Height (cm): 165.1 (21 May 2022 19:59)  Weight (kg): 53.5 (21 May 2022 20:03)  BMI (kg/m2): 19.6 (21 May 2022 20:03)  BSA (m2): 1.58 (21 May 2022 20:03)    PAST MEDICAL & SURGICAL HISTORY:  Dementia of frontal lobe type      Aphasic stroke      Diabetes mellitus      Respiratory failure      Hypertension      GERD (gastroesophageal reflux disease)      Constipation      Respiratory failure      CVA (cerebral vascular accident)      HTN (hypertension)      DM (diabetes mellitus)      Advanced dementia      COVID-19 virus detected      Quadriplegia      Pneumonia      Type II diabetes mellitus      Hx of appendectomy      Gastrostomy in place      Tracheostomy in place      Tracheostomy tube present      Feeding by G-tube          FAMILY HISTORY:  No pertinent family history in first degree relatives        ROS: See HPI; otherwise, all systems reviewed and negative.    O:    ICU Vital Signs Last 24 Hrs  T(C): 37.1 (27 May 2022 03:22), Max: 37.1 (27 May 2022 03:22)  T(F): 98.7 (27 May 2022 03:22), Max: 98.7 (27 May 2022 03:22)  HR: 99 (27 May 2022 05:00) (62 - 99)  BP: 150/70 (27 May 2022 05:00) (94/53 - 150/70)  BP(mean): 94 (27 May 2022 05:00) (67 - 94)  ABP: --  ABP(mean): --  RR: 32 (27 May 2022 05:00) (0 - 32)  SpO2: 95% (27 May 2022 05:00) (92% - 100%)          I&O's Detail    25 May 2022 07:01  -  26 May 2022 07:00  --------------------------------------------------------  IN:    IV PiggyBack: 950 mL    Jevity 1.5: 420 mL  Total IN: 1370 mL    OUT:    Voided (mL): 480 mL  Total OUT: 480 mL    Total NET: 890 mL      26 May 2022 07:01  -  27 May 2022 06:07  --------------------------------------------------------  IN:    Free Water: 200 mL    Glucerna 1.5: 80 mL    Jevity 1.5: 160 mL  Total IN: 440 mL    OUT:    Blood Loss (mL): 1 mL    Voided (mL): 600 mL  Total OUT: 601 mL    Total NET: -161 mL          Mode: AC/ CMV (Assist Control/ Continuous Mandatory Ventilation)  RR (machine): 26  TV (machine): 400  FiO2: 30  PEEP: 5  ITime: 0.9  MAP: 14  PIP: 31      PE:    Constitutional: Healthy M lying in bed in NAD.   Neck: No JVD, trachea midline.   Respiratory: CTA B/L good BS B/L no W/R/R.  Cardiovascular: S1S2+ RRR no M/R/G.  Gastrointestinal: Soft, NTND.  Extremities: No peripheral edema, No cyanosis, clubbing.  Neurological: Awake, conversive, alert, no gross deficits.  Skin: No rashes, warm, moist.    LABS:    CBC Full  -  ( 26 May 2022 06:04 )  WBC Count : 11.28 K/uL  RBC Count : 3.04 M/uL  Hemoglobin : 7.5 g/dL  Hematocrit : 25.8 %  Platelet Count - Automated : 243 K/uL  Mean Cell Volume : 84.9 fl  Mean Cell Hemoglobin : 24.7 pg  Mean Cell Hemoglobin Concentration : 29.1 gm/dL  Auto Neutrophil # : 10.06 K/uL  Auto Lymphocyte # : 0.49 K/uL  Auto Monocyte # : 0.55 K/uL  Auto Eosinophil # : 0.14 K/uL  Auto Basophil # : 0.01 K/uL  Auto Neutrophil % : 89.2 %  Auto Lymphocyte % : 4.3 %  Auto Monocyte % : 4.9 %  Auto Eosinophil % : 1.2 %  Auto Basophil % : 0.1 %    05-26    142  |  106  |  28<H>  ----------------------------<  181<H>  3.4<L>   |  26  |  1.28    Ca    7.9<L>      26 May 2022 06:04    TPro  5.3<L>  /  Alb  1.7<L>  /  TBili  0.6  /  DBili  x   /  AST  23  /  ALT  65<H>  /  AlkPhos  120  05-26        CAPILLARY BLOOD GLUCOSE      POCT Blood Glucose.: 160 mg/dL (27 May 2022 05:52)  POCT Blood Glucose.: 129 mg/dL (26 May 2022 23:03)  POCT Blood Glucose.: 124 mg/dL (26 May 2022 17:26)  POCT Blood Glucose.: 111 mg/dL (26 May 2022 12:34)  POCT Blood Glucose.: 153 mg/dL (26 May 2022 08:30)        LIVER FUNCTIONS - ( 26 May 2022 06:04 )  Alb: 1.7 g/dL / Pro: 5.3 g/dL / ALK PHOS: 120 U/L / ALT: 65 U/L DA / AST: 23 U/L / GGT: x               A:    76yFemale  HD #    Here for:    1.    P:    Neuro: GCS 15. Monitor for delirium.  Continue to optimize pain control. Serial Neurologic assessments.    HEENT: No issues.    CV: Continue hemodynamic monitoring    Pulm: Pulmonary toilet.  Continue incentive spirometer.  Chest PT.  Encourage OOB to chair and ambulation. Nebs. f/u ABG, CXR.    GI/Nutrition: Cont diet, bowel regimen.    /Renal: Monitor UOP. Monitor BMP.  Replete Lytes as needed.    HEME- Chemical and mechanical DVT ppx. f/u CBC. f/u coags as needed.    ID:  Cont abx. f/u Cx's.    Lines/Tubes:     Endo: Maintain euglycemia.    Skin:  Cont skin care, pressure ulcer prevention.    Dispo: Cont care.       ICU Progress Note    S: Remains trach to vent. Etiology unclear, but likely acute resp failure from acute pulm edema and pleural effusion. Intermittent agitation. Continuing to hold anti htn meds.     Allergies    codeine (Hives)    Intolerances        MEDICATIONS  (STANDING):  ceftazidime/avibactam IVPB 1.25 Gram(s) IV Intermittent every 12 hours  chlorhexidine 0.12% Liquid 15 milliLiter(s) Oral Mucosa every 12 hours  chlorhexidine 2% Cloths 1 Application(s) Topical <User Schedule>  chlorhexidine 4% Liquid 1 Application(s) Topical <User Schedule>  collagenase Ointment 1 Application(s) Topical daily  dextrose 5%. 1000 milliLiter(s) (50 mL/Hr) IV Continuous <Continuous>  dextrose 5%. 1000 milliLiter(s) (100 mL/Hr) IV Continuous <Continuous>  dextrose 50% Injectable 25 Gram(s) IV Push once  dextrose 50% Injectable 12.5 Gram(s) IV Push once  dextrose 50% Injectable 25 Gram(s) IV Push once  glucagon  Injectable 1 milliGRAM(s) IntraMuscular once  heparin   Injectable 5000 Unit(s) SubCutaneous every 8 hours  insulin glargine Injectable (LANTUS) 5 Unit(s) SubCutaneous every morning  insulin lispro (ADMELOG) corrective regimen sliding scale   SubCutaneous every 6 hours  iron sucrose IVPB 200 milliGRAM(s) IV Intermittent every 24 hours  lactobacillus acidophilus 1 Tablet(s) Oral daily  LORazepam     Tablet 1 milliGRAM(s) Oral every 4 hours  mineral oil/petrolatum Hydrophilic Ointment 1 Application(s) Topical daily  nystatin Powder 1 Application(s) Topical three times a day  pantoprazole  Injectable 40 milliGRAM(s) IV Push daily  povidone iodine 10% Solution 1 Application(s) Topical daily    MEDICATIONS  (PRN):  acetaminophen    Suspension .. 650 milliGRAM(s) Oral every 6 hours PRN Temp greater or equal to 38C (100.4F), Mild Pain (1 - 3)  albuterol/ipratropium for Nebulization 3 milliLiter(s) Nebulizer every 6 hours PRN Shortness of Breath and/or Wheezing  dextrose Oral Gel 15 Gram(s) Oral once PRN Blood Glucose LESS THAN 70 milliGRAM(s)/deciliter  LORazepam   Injectable 1 milliGRAM(s) IV Push every 6 hours PRN agitation/vent dyssynchrony  sodium chloride 0.9% lock flush 10 milliLiter(s) IV Push every 1 hour PRN Pre/post blood products, medications, blood draw, and to maintain line patency      Drug Dosing Weight  Height (cm): 165.1 (21 May 2022 19:59)  Weight (kg): 53.5 (21 May 2022 20:03)  BMI (kg/m2): 19.6 (21 May 2022 20:03)  BSA (m2): 1.58 (21 May 2022 20:03)    PAST MEDICAL & SURGICAL HISTORY:  Dementia of frontal lobe type      Aphasic stroke      Diabetes mellitus      Respiratory failure      Hypertension      GERD (gastroesophageal reflux disease)      Constipation      Respiratory failure      CVA (cerebral vascular accident)      HTN (hypertension)      DM (diabetes mellitus)      Advanced dementia      COVID-19 virus detected      Quadriplegia      Pneumonia      Type II diabetes mellitus      Hx of appendectomy      Gastrostomy in place      Tracheostomy in place      Tracheostomy tube present      Feeding by G-tube          FAMILY HISTORY:  No pertinent family history in first degree relatives        ROS: See HPI; otherwise, all systems reviewed and negative.    O:    ICU Vital Signs Last 24 Hrs  T(C): 37.1 (27 May 2022 03:22), Max: 37.1 (27 May 2022 03:22)  T(F): 98.7 (27 May 2022 03:22), Max: 98.7 (27 May 2022 03:22)  HR: 99 (27 May 2022 05:00) (62 - 99)  BP: 150/70 (27 May 2022 05:00) (94/53 - 150/70)  BP(mean): 94 (27 May 2022 05:00) (67 - 94)  ABP: --  ABP(mean): --  RR: 32 (27 May 2022 05:00) (0 - 32)  SpO2: 95% (27 May 2022 05:00) (92% - 100%)          I&O's Detail    25 May 2022 07:01  -  26 May 2022 07:00  --------------------------------------------------------  IN:    IV PiggyBack: 950 mL    Jevity 1.5: 420 mL  Total IN: 1370 mL    OUT:    Voided (mL): 480 mL  Total OUT: 480 mL    Total NET: 890 mL      26 May 2022 07:01  -  27 May 2022 06:07  --------------------------------------------------------  IN:    Free Water: 200 mL    Glucerna 1.5: 80 mL    Jevity 1.5: 160 mL  Total IN: 440 mL    OUT:    Blood Loss (mL): 1 mL    Voided (mL): 600 mL  Total OUT: 601 mL    Total NET: -161 mL          Mode: AC/ CMV (Assist Control/ Continuous Mandatory Ventilation)  RR (machine): 26  TV (machine): 400  FiO2: 30  PEEP: 5  ITime: 0.9  MAP: 14  PIP: 31      PE:    Constitutional:  NAD.   Respiratory: CTA B/L good BS B/L no W/R/R.  Cardiovascular: S1S2+ RRR no M/R/G.  Gastrointestinal: Soft, NTND.  Skin: No rashes, warm, moist.    LABS:    CBC Full  -  ( 26 May 2022 06:04 )  WBC Count : 11.28 K/uL  RBC Count : 3.04 M/uL  Hemoglobin : 7.5 g/dL  Hematocrit : 25.8 %  Platelet Count - Automated : 243 K/uL  Mean Cell Volume : 84.9 fl  Mean Cell Hemoglobin : 24.7 pg  Mean Cell Hemoglobin Concentration : 29.1 gm/dL  Auto Neutrophil # : 10.06 K/uL  Auto Lymphocyte # : 0.49 K/uL  Auto Monocyte # : 0.55 K/uL  Auto Eosinophil # : 0.14 K/uL  Auto Basophil # : 0.01 K/uL  Auto Neutrophil % : 89.2 %  Auto Lymphocyte % : 4.3 %  Auto Monocyte % : 4.9 %  Auto Eosinophil % : 1.2 %  Auto Basophil % : 0.1 %    05-26    142  |  106  |  28<H>  ----------------------------<  181<H>  3.4<L>   |  26  |  1.28    Ca    7.9<L>      26 May 2022 06:04    TPro  5.3<L>  /  Alb  1.7<L>  /  TBili  0.6  /  DBili  x   /  AST  23  /  ALT  65<H>  /  AlkPhos  120  05-26        CAPILLARY BLOOD GLUCOSE      POCT Blood Glucose.: 160 mg/dL (27 May 2022 05:52)  POCT Blood Glucose.: 129 mg/dL (26 May 2022 23:03)  POCT Blood Glucose.: 124 mg/dL (26 May 2022 17:26)  POCT Blood Glucose.: 111 mg/dL (26 May 2022 12:34)  POCT Blood Glucose.: 153 mg/dL (26 May 2022 08:30)        LIVER FUNCTIONS - ( 26 May 2022 06:04 )  Alb: 1.7 g/dL / Pro: 5.3 g/dL / ALK PHOS: 120 U/L / ALT: 65 U/L DA / AST: 23 U/L / GGT: x               A:    76yFemale  HD #    Here for:    1. Acute resp failure  2. hypotension  3. pleural effusion  4. pulmonary edema    P:    s/p thoracentesis with removal of 1500 cc on 5/25  Pulm edema improving  Cont ceftazadime. f/u ID reqs with culutres from thoracentesis  Cont trach to vent support. Weam to cpap when she can tolerate  Hold anti htn meds  ativan prn agitation  tube feeds to goal  SQH for dvt ppx  Insulin for hyperglycemia / DM    Dispo: Cont care.

## 2022-05-27 NOTE — PROGRESS NOTE ADULT - SUBJECTIVE AND OBJECTIVE BOX
[facility], Division of Infectious Diseases  MOIZ Lora Y. Patel, S. Shah, G. [facility]  [phone]    Name: BREA BECKHAM  Age: 76y  Gender: Female  MRN: [id number]    Interval History:  Patient seen and examined at bedside  No acute overnight events. Afebrile  Nonverbal, vented  Notes reviewed    Antibiotics:  ceftazidime/avibactam IVPB 1.25 Gram(s) IV Intermittent every 12 hours      Medications:  acetaminophen    Suspension .. 650 milliGRAM(s) Oral every 6 hours PRN  albuterol/ipratropium for Nebulization 3 milliLiter(s) Nebulizer every 6 hours PRN  ceftazidime/avibactam IVPB 1.25 Gram(s) IV Intermittent every 12 hours  chlorhexidine 0.12% Liquid 15 milliLiter(s) Oral Mucosa every 12 hours  chlorhexidine 2% Cloths 1 Application(s) Topical <User Schedule>  chlorhexidine 4% Liquid 1 Application(s) Topical <User Schedule>  collagenase Ointment 1 Application(s) Topical daily  dextrose 5%. 1000 milliLiter(s) IV Continuous <Continuous>  dextrose 5%. 1000 milliLiter(s) IV Continuous <Continuous>  dextrose 50% Injectable 25 Gram(s) IV Push once  dextrose 50% Injectable 12.5 Gram(s) IV Push once  dextrose 50% Injectable 25 Gram(s) IV Push once  dextrose Oral Gel 15 Gram(s) Oral once PRN  glucagon  Injectable 1 milliGRAM(s) IntraMuscular once  heparin   Injectable 5000 Unit(s) SubCutaneous every 8 hours  insulin glargine Injectable (LANTUS) 5 Unit(s) SubCutaneous every morning  insulin lispro (ADMELOG) corrective regimen sliding scale   SubCutaneous every 6 hours  lactobacillus acidophilus 1 Tablet(s) Oral daily  LORazepam     Tablet 1 milliGRAM(s) Oral every 4 hours  LORazepam   Injectable 1 milliGRAM(s) IV Push every 6 hours PRN  mineral oil/petrolatum Hydrophilic Ointment 1 Application(s) Topical daily  nystatin Powder 1 Application(s) Topical three times a day  pantoprazole  Injectable 40 milliGRAM(s) IV Push daily  povidone iodine 10% Solution 1 Application(s) Topical daily  sodium chloride 0.9% lock flush 10 milliLiter(s) IV Push every 1 hour PRN      Review of Systems:  unable to obtain    Allergies: codeine (Hives)    For details regarding the patient's past medical history, social history, family history, and other miscellaneous elements, please refer the initial infectious diseases consultation and/or the admitting history and physical examination for this admission.    Objective:  Vitals:   T(C): 37.1 (05-27-22 @ 08:47), Max: 37.1 (05-27-22 @ 03:22)  HR: 72 (05-27-22 @ 12:00) (62 - 99)  BP: 103/52 (05-27-22 @ 12:00) (94/53 - 150/70)  RR: 22 (05-27-22 @ 12:00) (0 - 38)  SpO2: 100% (05-27-22 @ 12:00) (92% - 100%)    Physical Examination:  General: chronically ill appearing W  HEENT: NC/AT  Neck: trach to vent  Cardio: S1, S2 heard, RRR, no murmurs  Resp: MV breath sounds  Abd: soft, NT, ND, PGT in place  Neuro: nonverbal  Ext: no edema or cyanosis  Skin: warm, dry    Laboratory Studies:  CBC:                       8.4    11.79 )-----------( 242      ( 27 May 2022 06:00 )             28.1     CMP: 05-27    139  |  106  |  26<H>  ----------------------------<  153<H>  4.0   |  25  |  1.32<H>    Ca    8.3<L>      27 May 2022 06:00    TPro  5.3<L>  /  Alb  1.7<L>  /  TBili  0.6  /  DBili  x   /  AST  23  /  ALT  65<H>  /  AlkPhos  120  05-26    LIVER FUNCTIONS - ( 26 May 2022 06:04 )  Alb: 1.7 g/dL / Pro: 5.3 g/dL / ALK PHOS: 120 U/L / ALT: 65 U/L DA / AST: 23 U/L / GGT: x               Microbiology: reviewed    Culture - Fungal, Body Fluid (collected 05-25-22 @ 10:39)  Source: .Body Fluid Pleural Fluid  Preliminary Report (05-26-22 @ 06:53):    Testing in progress    Culture - Body Fluid with Gram Stain (collected 05-25-22 @ 10:39)  Source: .Body Fluid Pleural Fluid  Gram Stain (05-25-22 @ 19:50):    polymorphonuclear leukocytes seen    No organisms seen    by cytocentrifuge    Culture - Acid Fast - Body Fluid w/Smear (collected 05-25-22 @ 10:39)  Source: .Body Fluid Pleural Fluid    Culture - Sputum (collected 05-23-22 @ 11:20)  Source: Trach Asp Tracheal Aspirate  Gram Stain (05-23-22 @ 21:29):    Moderate polymorphonuclear leukocytes per low power field    No Squamous epithelial cells per low power field    Moderate Gram Negative Rods per oil power field  Final Report (05-25-22 @ 16:10):    Numerous Mixed gram negative rods including    Numerous Pseudomonas aeruginosa (Carbapenem Resistant)    Normal Respiratory Elvira absent  Organism: Pseudomonas aeruginosa (Carbapenem Resistant) (05-25-22 @ 16:10)  Organism: Pseudomonas aeruginosa (Carbapenem Resistant) (05-25-22 @ 16:10)      -  Amikacin: S <=16      -  Aztreonam: S <=4      -  Cefepime: S 4      -  Ceftazidime: S 8      -  Ciprofloxacin: S <=0.25      -  Gentamicin: S <=2      -  Imipenem: R >8      -  Levofloxacin: S <=0.5      -  Meropenem: R 8      -  Piperacillin/Tazobactam: S 16      -  Tobramycin: S <=2      Method Type: PRATIK    Culture - Blood (collected 05-21-22 @ 21:57)  Source: .Blood Blood-Peripheral  Preliminary Report (05-23-22 @ 14:01):    No growth to date.    Culture - Blood (collected 05-21-22 @ 21:57)  Source: .Blood Blood-Peripheral  Preliminary Report (05-23-22 @ 14:01):    No growth to date.    Culture - Urine (collected 05-21-22 @ 20:51)  Source: Clean Catch Clean Catch (Midstream)  Final Report (05-23-22 @ 10:12):    <10,000 CFU/mL Normal Urogenital Elvira        Radiology: reviewed

## 2022-05-27 NOTE — PROGRESS NOTE ADULT - ASSESSMENT
This is an 80 y/o F with PMH of HTN, DM type 2, Cardiac Arrest, CVA, COPD, Chronic Respiratory Failure Vent Dependant s/p trach & PEG, Multiple Hospital Admissions for Aspiration & vent associated PNA, COVID-19 Infection, Anemia with GI blood loss, GERD, and Advanced Dementia who was sent from [facility] facility for acute respiratory distress.     Sepsis 2/2 VAP/PNA c/b pleural effusions  Pleural Effusions: parapneumonic vs. empyema  Acute on Chronic RF, vented   - pt w/ hx of multiple admissions  - pt p/w acute RF, likely recurrent VAP  - prior sputum cx reviewed. 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  - s/p IR guided thoracentesis on 5/25    --pleural fluid cx NGTD  - repeat 5/25 CT chest w/ improved aeration  Plan:  C/w avycaz for now, plan for x7 day course w/ last day tomorrow  S/p linezolid  C/w vent management per ICU  Pulm toilet  Maintain aspiration precautions  Trend temps/WBC    Dr. Cliff Olivas covering weekend service  Infectious Diseases will continue to follow. Please call with any questions.   Andressa Brantley M.D.  [facility], Division of Infectious Diseases [phone]

## 2022-05-27 NOTE — PROGRESS NOTE ADULT - ASSESSMENT
79F HTN, DM2, COPD, Chronic Respiratory Failure on Trach to Vent admitted for Acute on Chronic Respiratory Failure.     Acute on Chronic Respiratory Failure / Septic Shock   Aspiration vs. VAP vs ; History of CRE and Psuedomonas; Remains Afebrile;   S/P IR Thoracentesis (L) 1500cc and will follow up cultures; MRSA negative  Continue IV Ceftazidime and ID to guide therapy  ID and Pulmonary consults appreciated     Anemia of Chronic Disease with Iron Deficiency  Has been evaluated by GI and Hematology on prior admissions  She has Anemia of Chronic Disease but could also have intermittent GI Bleeding; Occult Blood Negative  S/P 1U PRBC Transfusion and IV Venofer    Acute Renal Failure on CKD 3  Baseline Cr around 1.2  Improving - Most likely due to Sepsis   Will continue gentle hydration and maintain BP   Renally dose and monitor BMP and electrolytes     HTN  Hold all BP lower agents    DM2  FS and on ISS to maintain BS <180    Diet  Tube Feeds    DVT Prophylaxis  Heparin    Disposition  Full Code   Discharge planning pending culture results and antibiotic regimen

## 2022-05-27 NOTE — PROGRESS NOTE ADULT - ASSESSMENT
REVIEW OF SYMPTOMS      Able to give (reliable) ROS  NO     PHYSICAL EXAM    HEENT Unremarkable  atraumatic   RESP Fair air entry EXP prolonged    Harsh breath sound Resp distres mild   CARDIAC S1 S2 No S3     NO JVD    ABDOMEN SOFT BS PRESENT NOT DISTENDED No hepatosplenomegaly   PEDAL EDEMA present No calf tenderness  NO rash       AGE/SEX.   76 f    DOA.  5/21/2022     CC .  5/21/2022 AOC RESP FAILURE     PMH .  pmh Hosp stay given zosyn 4/21  pmh Pneumonia    pmh HTN,   pmh DM,   pmh CVA,   pmh  chronic respiratory failure   pmh s/p trach, PEG,   pmh cardiac arrest        MAIN ISSUES  .  TRACH PERF REPORTED BY RADIO 5/25 RULED OUT ON CT    SEVERE HYPOXIC RESP  FAILURE poa   VAP poa   SHOCK poa   BL PL EFFSNS   5/25/2022 Thoracentesis  CHF  PULM HYTN   ANEMIA 5/23/2022 Hb 6.8   ELEVATED LFTS       COVID/ICU/CODE STATUS.                       COVID  STATUS. 5/21/2022 scv2 (-)           ICU STAY. 5/21  GOC.  5/24/2022 full code    BEST PRACTICE ISSUES.                                                  HEAD OF BED ELEVATION. Yes  DVT PROPHYLAXIS.     5/21/2022 hpsc    SQUIRES PROPHYLAXIS.5/22/2022 protonix 40   INFECTION PROPHYLAXIS.   5/21/2022 Ch;lorhexidine .12%   5/21/2022 Chlorhexidine 2%                                                                                         DIET.   5/22/2022 jevity 1.5 1200 gt     VITALS/PO/IO/VENT/DRIPS.   5/27/2022 afeb 73 100/50  5/27/2022 26/400/5/.3      PROBLEM DATA/ASSESSMENT RECOMMENDATIONS (A/R).    HEMODYNAMICS.   Monitor and optimize bp   Target MAP to miguel  65 (+)    RESP.   Monitor and optimize  po   Target po to remain 90-95%    ABG.  5/23/2022 5a ac 26/400/10/70%  755/34/81   5/22/2022 5a vent 90% 744/41/117   5/21/2022 8p 100% vent 739/50/48    PMH/PSH PROBLEMS.  Management continued/modified as indicated      VENT MANAGEMENT.   HOB elevation  Target Pplat 30 (-)  Target PO 90-95%  Target pH 730 (+)  Daily spontaneous breathing trials   Daily sedation vacation         DIARRHEA.  5/23/2022 c diff (-)     PICC poa   5/23/2022 Ordered to dc picc    INFECTION SOURCE DD.   INFECTION A/R.   Has ho crp   Has ho iv abio within last 90 d  Is on bsab   id eval Dr BRITTANY Brantley appreciated   Started on ceftazidime avibactam 1.25x2 5/22/2022      SEVERE HYPOXIC RESP FAILURE poa.   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  V duplx 5/23/2022 (-)     COPD.  5/22/2022 duoneb     PLEURAL EFFSNS.  echo 9/8/2021   n lvsf  mild dd  n rvsf  rvsp 51   ct 4/22/2022 ct ch 4/21/2022   bl effsns increased in size cw 1/2022 5/25/2022 1.5 l clear pl effsn removed left side IR  5/25/2022 g 183 l 144/381 .37 p 3.4/5.3 .64 p 23 l 36   5/25/2022l pl fluid  lymph pred exudate   cyto (-)         MI.  5/23-5/24/2022 Tr 258-155     CHF.  echo 9/8/2021   n lvsf   mild dd   n rvsf   rvsp 51   bnp 5/21/2022 bnp [id number]   5/22/2022 cardio consulted      ANEMIA.   Hb 5/21-5/22-5/23-5/24-5/25-5/26-5/27/2022   Hb 8 - 7.2 - 6.8 - 8 - 7.8-7.5 - 8.4   monitor  target hb 7 (+)     TRANSFUSION.  5/23/2022 1 u prbc    RYAN.  Na 5/21/2022 Na 140  CO2 5/21/2022 CO2 30   Cr 5/21-5/22-5/23-5/24-5/25-5/26-5/27/2022   Cr 1.6 - 1.5- 1.4 - 1.4-1.4 - 1.2 - 1.3    monitor     ELEVATED LFTS.  LFTS 5/22-5/23-5/24-5/26/2022  - 136-124-120   - 104-47-23   - 149 - 105-65   5/22/2022 us abd fibrofatty liver dis borderline gbw thick tr perichole fluid   5/22/2022  hep panel (-)         TIME SPENT   Over 36  minutes aggregate critical care time spent on encounter; activities included   direct patient care, counseling and/or coordinating care reviewing notes, lab data/ imaging , discussion with multidisciplinary team/ patient  /family and explaining in detail risks, benefits, alternatives  of the recommendations     CHAPINCITO GUIDRY 76 f [facility] S 5/21/2022

## 2022-05-27 NOTE — PROGRESS NOTE ADULT - SUBJECTIVE AND OBJECTIVE BOX
Chief Complaint: Respiratory distress    Interval Events: No events overnight.    Review of Systems:  Unable to obtain    Physical Exam:  Vital Signs Last 24 Hrs  T(C): 37.1 (27 May 2022 08:47), Max: 37.1 (27 May 2022 03:22)  T(F): 98.8 (27 May 2022 08:47), Max: 98.8 (27 May 2022 08:47)  HR: 82 (27 May 2022 08:04) (62 - 99)  BP: 101/50 (27 May 2022 08:00) (94/53 - 150/70)  BP(mean): 66 (27 May 2022 08:00) (66 - 94)  RR: 26 (27 May 2022 08:00) (0 - 32)  SpO2: 96% (27 May 2022 08:04) (92% - 100%)  General: NAD  HEENT: Trach  Neck: No JVD, no carotid bruit  Lungs: Coarse bilaterally  CV: RRR, nl S1/S2, no M/R/G  Abdomen: S/NT/ND, +BS  Extremities: No LE edema, no cyanosis  Neuro: AAOx0  Skin: No rash    Labs:    05-27    139  |  106  |  26<H>  ----------------------------<  153<H>  4.0   |  25  |  1.32<H>    Ca    8.3<L>      27 May 2022 06:00    TPro  5.3<L>  /  Alb  1.7<L>  /  TBili  0.6  /  DBili  x   /  AST  23  /  ALT  65<H>  /  AlkPhos  120  05-26                        8.4    11.79 )-----------( 242      ( 27 May 2022 06:00 )             28.1         Telemetry: Sinus rhythm

## 2022-05-27 NOTE — PROGRESS NOTE ADULT - SUBJECTIVE AND OBJECTIVE BOX
Patient underwent septoplasty/grafting and resection of nasal turbinate on February 11.  Patient states she had a lot of bleeding the night of surgery, and had to call Dr. Pendleton the morning of February 13 because she had some of the surgicel come out of her nose.  She has been using saline spray for irrigation and still sees some old blood come out.  She describes ongoing nasal congestion that is about the same, not worse as the days progress and no increase in pain.  Patient wants to be sure the swelling is normal.      Discussed with Dr. Pendleton as well.  Congestion at this point is normal, she may continue to elevate and irrigate her nose as directed.  Instructed patient on signs of infection: pain, increased swelling congestion.  Postop appointment on  2/18.  Follow up sooner with concerns.  All questions answered.        Date/Time Patient Seen:  		  Referring MD:   Data Reviewed	       Patient is a 76y old  Female who presents with a chief complaint of Acute respiratory distress. (27 May 2022 06:07)      Subjective/HPI     PAST MEDICAL & SURGICAL HISTORY:  Dementia of frontal lobe type    Aphasic stroke    Diabetes mellitus    Respiratory failure    Hypertension    GERD (gastroesophageal reflux disease)    Constipation    Respiratory failure    CVA (cerebral vascular accident)    HTN (hypertension)    DM (diabetes mellitus)    Advanced dementia    COVID-19 virus detected    Quadriplegia    Pneumonia    Type II diabetes mellitus    Hx of appendectomy    Gastrostomy in place    Tracheostomy in place    Tracheostomy tube present    Feeding by G-tube          Medication list         MEDICATIONS  (STANDING):  ceftazidime/avibactam IVPB 1.25 Gram(s) IV Intermittent every 12 hours  chlorhexidine 0.12% Liquid 15 milliLiter(s) Oral Mucosa every 12 hours  chlorhexidine 2% Cloths 1 Application(s) Topical <User Schedule>  chlorhexidine 4% Liquid 1 Application(s) Topical <User Schedule>  collagenase Ointment 1 Application(s) Topical daily  dextrose 5%. 1000 milliLiter(s) (50 mL/Hr) IV Continuous <Continuous>  dextrose 5%. 1000 milliLiter(s) (100 mL/Hr) IV Continuous <Continuous>  dextrose 50% Injectable 25 Gram(s) IV Push once  dextrose 50% Injectable 12.5 Gram(s) IV Push once  dextrose 50% Injectable 25 Gram(s) IV Push once  glucagon  Injectable 1 milliGRAM(s) IntraMuscular once  heparin   Injectable 5000 Unit(s) SubCutaneous every 8 hours  insulin glargine Injectable (LANTUS) 5 Unit(s) SubCutaneous every morning  insulin lispro (ADMELOG) corrective regimen sliding scale   SubCutaneous every 6 hours  iron sucrose IVPB 200 milliGRAM(s) IV Intermittent every 24 hours  lactobacillus acidophilus 1 Tablet(s) Oral daily  LORazepam     Tablet 1 milliGRAM(s) Oral every 4 hours  mineral oil/petrolatum Hydrophilic Ointment 1 Application(s) Topical daily  nystatin Powder 1 Application(s) Topical three times a day  pantoprazole  Injectable 40 milliGRAM(s) IV Push daily  povidone iodine 10% Solution 1 Application(s) Topical daily    MEDICATIONS  (PRN):  acetaminophen    Suspension .. 650 milliGRAM(s) Oral every 6 hours PRN Temp greater or equal to 38C (100.4F), Mild Pain (1 - 3)  albuterol/ipratropium for Nebulization 3 milliLiter(s) Nebulizer every 6 hours PRN Shortness of Breath and/or Wheezing  dextrose Oral Gel 15 Gram(s) Oral once PRN Blood Glucose LESS THAN 70 milliGRAM(s)/deciliter  LORazepam   Injectable 1 milliGRAM(s) IV Push every 6 hours PRN agitation/vent dyssynchrony  sodium chloride 0.9% lock flush 10 milliLiter(s) IV Push every 1 hour PRN Pre/post blood products, medications, blood draw, and to maintain line patency         Vitals log        ICU Vital Signs Last 24 Hrs  T(C): 37.1 (27 May 2022 03:22), Max: 37.1 (27 May 2022 03:22)  T(F): 98.7 (27 May 2022 03:22), Max: 98.7 (27 May 2022 03:22)  HR: 77 (27 May 2022 06:00) (62 - 99)  BP: 107/61 (27 May 2022 06:00) (94/53 - 150/70)  BP(mean): 75 (27 May 2022 06:00) (67 - 94)  ABP: --  ABP(mean): --  RR: 18 (27 May 2022 06:00) (0 - 32)  SpO2: 95% (27 May 2022 06:00) (92% - 100%)       Mode: AC/ CMV (Assist Control/ Continuous Mandatory Ventilation)  RR (machine): 26  TV (machine): 400  FiO2: 30  PEEP: 5  ITime: 0.9  MAP: 14  PIP: 32      Input and Output:  I&O's Detail    25 May 2022 07:01  -  26 May 2022 07:00  --------------------------------------------------------  IN:    IV PiggyBack: 950 mL    Jevity 1.5: 420 mL  Total IN: 1370 mL    OUT:    Voided (mL): 480 mL  Total OUT: 480 mL    Total NET: 890 mL      26 May 2022 07:01  -  27 May 2022 06:45  --------------------------------------------------------  IN:    Free Water: 200 mL    Glucerna 1.5: 400 mL    Jevity 1.5: 160 mL  Total IN: 760 mL    OUT:    Blood Loss (mL): 1 mL    Voided (mL): 600 mL  Total OUT: 601 mL    Total NET: 159 mL          Lab Data                        8.4    11.79 )-----------( 242      ( 27 May 2022 06:00 )             28.1     05-27    139  |  106  |  26<H>  ----------------------------<  153<H>  4.0   |  25  |  1.32<H>    Ca    8.3<L>      27 May 2022 06:00    TPro  5.3<L>  /  Alb  1.7<L>  /  TBili  0.6  /  DBili  x   /  AST  23  /  ALT  65<H>  /  AlkPhos  120  05-26            Review of Systems	      Objective     Physical Examination    heart s1s2  lung dec BS  abd soft      Pertinent Lab findings & Imaging      Annalise:  NO   Adequate UO     I&O's Detail    25 May 2022 07:01  -  26 May 2022 07:00  --------------------------------------------------------  IN:    IV PiggyBack: 950 mL    Jevity 1.5: 420 mL  Total IN: 1370 mL    OUT:    Voided (mL): 480 mL  Total OUT: 480 mL    Total NET: 890 mL      26 May 2022 07:01  -  27 May 2022 06:45  --------------------------------------------------------  IN:    Free Water: 200 mL    Glucerna 1.5: 400 mL    Jevity 1.5: 160 mL  Total IN: 760 mL    OUT:    Blood Loss (mL): 1 mL    Voided (mL): 600 mL  Total OUT: 601 mL    Total NET: 159 mL               Discussed with:     Cultures:	        Radiology

## 2022-05-27 NOTE — PROGRESS NOTE ADULT - SUBJECTIVE AND OBJECTIVE BOX
BREA BECKHAM     [facility] 04    Allergies    codeine (Hives)    Intolerances        PAST MEDICAL & SURGICAL HISTORY:  Dementia of frontal lobe type      Aphasic stroke      Diabetes mellitus      Respiratory failure      Hypertension      GERD (gastroesophageal reflux disease)      Constipation      Respiratory failure      CVA (cerebral vascular accident)      HTN (hypertension)      DM (diabetes mellitus)      Advanced dementia      COVID-19 virus detected      Quadriplegia      Pneumonia      Type II diabetes mellitus      Hx of appendectomy      Gastrostomy in place      Tracheostomy in place      Tracheostomy tube present      Feeding by G-tube          FAMILY HISTORY:  No pertinent family history in first degree relatives        Home Medications:  albuterol 90 mcg/inh inhalation aerosol with adapter: 2  inhaled every 6 hours (21 May 2022 21:16)  Bacid (LAC) oral tablet: 2 tab(s) by gastrostomy tube once a day (21 May 2022 21:16)  Basaglar KwikPen 100 units/mL subcutaneous solution: 36 unit(s) subcutaneous once a day (at bedtime) (21 May 2022 21:16)  Betadine 10% topical swab: cleanse right and left great toes (21 May 2022 21:16)  Carafate 1 g/10 mL oral suspension: 10 milliliter(s) by gastrostomy tube 4 times a day (before meals and at bedtime) for 14 days (Started 6/4/21) (21 May 2022 21:16)  chlorhexidine 0.12% mucous membrane liquid: 15 milliliter(s) mucous membrane 2 times a day (21 May 2022 21:16)  Eucerin topical cream: Apply topically to affected area once a day bilateral feet (21 May 2022 21:16)  folic acid 1 mg oral tablet: 1 tab(s) orally once a day (21 May 2022 21:16)  ipratropium-albuterol 0.5 mg-2.5 mg/3 mL inhalation solution: 3 milliliter(s) inhaled 4 times a day (21 May 2022 21:16)  LORazepam 1 mg oral tablet: 1 tab(s) by gastrostomy tube every 4 hours (21 May 2022 21:16)  methocarbamol 500 mg oral tablet: 1 tab(s) by gastrostomy tube 2 times a day (21 May 2022 21:16)  MiraLax oral powder for reconstitution:  (21 May 2022 23:14)  Multiple Vitamins oral tablet: 1 tab(s) orally once a day (21 May 2022 21:16)  omeprazole 20 mg oral delayed release capsule: orally 2 times a day (21 May 2022 23:14)  polyethylene glycol 3350 oral powder for reconstitution: 17 gram(s) by gastrostomy tube every 12 hours (21 May 2022 21:16)  senna 8.6 mg oral tablet: 3 tab(s) by gastrostomy tube once a day (at bedtime) (21 May 2022 21:16)  simethicone 80 mg oral tablet, chewable: 1 tab(s) by gastrostomy tube every 6 hours (21 May 2022 21:16)  Tylenol 325 mg oral tablet: 2 tab(s) by gastrostomy tube once a day; 60 minutes prior to dressing change  (21 May 2022 21:16)  Tylenol 325 mg oral tablet: 2 tab(s) by gastrostomy tube every 6 hours, As Needed (21 May 2022 21:16)  Zosyn:  (21 May 2022 23:14)      MEDICATIONS  (STANDING):  ceftazidime/avibactam IVPB 1.25 Gram(s) IV Intermittent every 12 hours  chlorhexidine 0.12% Liquid 15 milliLiter(s) Oral Mucosa every 12 hours  chlorhexidine 2% Cloths 1 Application(s) Topical <User Schedule>  chlorhexidine 4% Liquid 1 Application(s) Topical <User Schedule>  collagenase Ointment 1 Application(s) Topical daily  dextrose 5%. 1000 milliLiter(s) (50 mL/Hr) IV Continuous <Continuous>  dextrose 5%. 1000 milliLiter(s) (100 mL/Hr) IV Continuous <Continuous>  dextrose 50% Injectable 25 Gram(s) IV Push once  dextrose 50% Injectable 12.5 Gram(s) IV Push once  dextrose 50% Injectable 25 Gram(s) IV Push once  glucagon  Injectable 1 milliGRAM(s) IntraMuscular once  heparin   Injectable 5000 Unit(s) SubCutaneous every 8 hours  insulin glargine Injectable (LANTUS) 5 Unit(s) SubCutaneous every morning  insulin lispro (ADMELOG) corrective regimen sliding scale   SubCutaneous every 6 hours  iron sucrose IVPB 200 milliGRAM(s) IV Intermittent every 24 hours  lactobacillus acidophilus 1 Tablet(s) Oral daily  LORazepam     Tablet 1 milliGRAM(s) Oral every 4 hours  mineral oil/petrolatum Hydrophilic Ointment 1 Application(s) Topical daily  nystatin Powder 1 Application(s) Topical three times a day  pantoprazole  Injectable 40 milliGRAM(s) IV Push daily  povidone iodine 10% Solution 1 Application(s) Topical daily    MEDICATIONS  (PRN):  acetaminophen    Suspension .. 650 milliGRAM(s) Oral every 6 hours PRN Temp greater or equal to 38C (100.4F), Mild Pain (1 - 3)  albuterol/ipratropium for Nebulization 3 milliLiter(s) Nebulizer every 6 hours PRN Shortness of Breath and/or Wheezing  dextrose Oral Gel 15 Gram(s) Oral once PRN Blood Glucose LESS THAN 70 milliGRAM(s)/deciliter  LORazepam   Injectable 1 milliGRAM(s) IV Push every 6 hours PRN agitation/vent dyssynchrony  sodium chloride 0.9% lock flush 10 milliLiter(s) IV Push every 1 hour PRN Pre/post blood products, medications, blood draw, and to maintain line patency      Diet, NPO with Tube Feed:   Tube Feeding Modality: Gastrostomy  Glucerna 1.5 Horacio  Total Volume for 24 Hours (mL): 1200  Continuous  Starting Tube Feed Rate mL per Hour: 20  Increase Tube Feed Rate by (mL): 10     Every 6 hours  Until Goal Tube Feed Rate (mL per Hour): 60  Tube Feed Duration (in Hours): 20  Tube Feed Start Time: 00:00 (05-22-22 @ 11:42) [Active]          Vital Signs Last 24 Hrs  T(C): 37.1 (27 May 2022 03:22), Max: 37.1 (27 May 2022 03:22)  T(F): 98.7 (27 May 2022 03:22), Max: 98.7 (27 May 2022 03:22)  HR: 68 (27 May 2022 06:32) (62 - 99)  BP: 107/61 (27 May 2022 06:00) (94/53 - 150/70)  BP(mean): 75 (27 May 2022 06:00) (67 - 94)  RR: 18 (27 May 2022 06:00) (0 - 32)  SpO2: 97% (27 May 2022 06:32) (92% - 100%)      05-26-22 @ 07:01  -  05-27-22 @ 07:00  --------------------------------------------------------  IN: 760 mL / OUT: 601 mL / NET: 159 mL        Mode: AC/ CMV (Assist Control/ Continuous Mandatory Ventilation), RR (machine): 26, TV (machine): 400, FiO2: 30, PEEP: 5, ITime: 0.9, MAP: 14, PIP: 31      LABS:                        8.4    11.79 )-----------( 242      ( 27 May 2022 06:00 )             28.1     05-27    139  |  106  |  26<H>  ----------------------------<  153<H>  4.0   |  25  |  1.32<H>    Ca    8.3<L>      27 May 2022 06:00    TPro  5.3<L>  /  Alb  1.7<L>  /  TBili  0.6  /  DBili  x   /  AST  23  /  ALT  65<H>  /  AlkPhos  120  05-26              WBC:  WBC Count: 11.79 K/uL (05-27 @ 06:00)  WBC Count: 11.28 K/uL (05-26 @ 06:04)  WBC Count: 11.83 K/uL (05-25 @ 06:00)  WBC Count: 14.23 K/uL (05-24 @ 06:56)      MICROBIOLOGY:  RECENT CULTURES:  05-25 .Body Fluid Pleural Fluid XXXX   polymorphonuclear leukocytes seen  No organisms seen  by cytocentrifuge   Testing in progress    05-23 Trach Asp Tracheal Aspirate Pseudomonas aeruginosa (Carbapenem Resistant)   Moderate polymorphonuclear leukocytes per low power field  No Squamous epithelial cells per low power field  Moderate Gram Negative Rods per oil power field   Numerous Mixed gram negative rods including  Numerous Pseudomonas aeruginosa (Carbapenem Resistant)  Normal Respiratory Elvira absent    05-21 .Blood Blood-Peripheral XXXX XXXX   No growth to date.    05-21 Clean Catch Clean Catch (Midstream) XXXX XXXX   <10,000 CFU/mL Normal Urogenital Elvira                    Sodium:  Sodium, Serum: 139 mmol/L (05-27 @ 06:00)  Sodium, Serum: 142 mmol/L (05-26 @ 06:04)  Sodium, Serum: 143 mmol/L (05-25 @ 06:00)  Sodium, Serum: 144 mmol/L (05-24 @ 06:56)      1.32 mg/dL 05-27 @ 06:00  1.28 mg/dL 05-26 @ 06:04  1.41 mg/dL 05-25 @ 06:00  1.48 mg/dL 05-24 @ 06:56      Hemoglobin:  Hemoglobin: 8.4 g/dL (05-27 @ 06:00)  Hemoglobin: 7.5 g/dL (05-26 @ 06:04)  Hemoglobin: 7.8 g/dL (05-25 @ 06:00)  Hemoglobin: 8.0 g/dL (05-24 @ 06:56)  Hemoglobin: 8.2 g/dL (05-23 @ 20:09)      Platelets: Platelet Count - Automated: 242 K/uL (05-27 @ 06:00)  Platelet Count - Automated: 243 K/uL (05-26 @ 06:04)  Platelet Count - Automated: 255 K/uL (05-25 @ 06:00)  Platelet Count - Automated: 291 K/uL (05-24 @ 06:56)      LIVER FUNCTIONS - ( 26 May 2022 06:04 )  Alb: 1.7 g/dL / Pro: 5.3 g/dL / ALK PHOS: 120 U/L / ALT: 65 U/L DA / AST: 23 U/L / GGT: x                 RADIOLOGY & ADDITIONAL STUDIES:      MICROBIOLOGY:  RECENT CULTURES:  05-25 .Body Fluid Pleural Fluid XXXX   polymorphonuclear leukocytes seen  No organisms seen  by cytocentrifuge   Testing in progress    05-23 Trach Asp Tracheal Aspirate Pseudomonas aeruginosa (Carbapenem Resistant)   Moderate polymorphonuclear leukocytes per low power field  No Squamous epithelial cells per low power field  Moderate Gram Negative Rods per oil power field   Numerous Mixed gram negative rods including  Numerous Pseudomonas aeruginosa (Carbapenem Resistant)  Normal Respiratory Elvira absent    05-21 .Blood Blood-Peripheral XXXX XXXX   No growth to date.    05-21 Clean Catch Clean Catch (Midstream) XXXX XXXX   <10,000 CFU/mL Normal Urogenital Elvira

## 2022-05-28 LAB
ADENOSINE DEAMINASE PLR-CCNC: 3 U/L — SIGNIFICANT CHANGE UP (ref 0–30)
ALBUMIN SERPL ELPH-MCNC: 1.7 G/DL — LOW (ref 3.3–5)
ALP SERPL-CCNC: 127 U/L — HIGH (ref 30–120)
ALT FLD-CCNC: 34 U/L DA — SIGNIFICANT CHANGE UP (ref 10–60)
ANION GAP SERPL CALC-SCNC: 9 MMOL/L — SIGNIFICANT CHANGE UP (ref 5–17)
AST SERPL-CCNC: 16 U/L — SIGNIFICANT CHANGE UP (ref 10–40)
BASOPHILS # BLD AUTO: 0.02 K/UL — SIGNIFICANT CHANGE UP (ref 0–0.2)
BASOPHILS NFR BLD AUTO: 0.2 % — SIGNIFICANT CHANGE UP (ref 0–2)
BILIRUB SERPL-MCNC: 0.6 MG/DL — SIGNIFICANT CHANGE UP (ref 0.2–1.2)
BUN SERPL-MCNC: 26 MG/DL — HIGH (ref 7–23)
CALCIUM SERPL-MCNC: 8.3 MG/DL — LOW (ref 8.4–10.5)
CHLORIDE SERPL-SCNC: 108 MMOL/L — SIGNIFICANT CHANGE UP (ref 96–108)
CO2 SERPL-SCNC: 25 MMOL/L — SIGNIFICANT CHANGE UP (ref 22–31)
CREAT SERPL-MCNC: 1.26 MG/DL — SIGNIFICANT CHANGE UP (ref 0.5–1.3)
EGFR: 44 ML/MIN/1.73M2 — LOW
EOSINOPHIL # BLD AUTO: 0.06 K/UL — SIGNIFICANT CHANGE UP (ref 0–0.5)
EOSINOPHIL NFR BLD AUTO: 0.6 % — SIGNIFICANT CHANGE UP (ref 0–6)
GLUCOSE SERPL-MCNC: 177 MG/DL — HIGH (ref 70–99)
HCT VFR BLD CALC: 28 % — LOW (ref 34.5–45)
HGB BLD-MCNC: 8.3 G/DL — LOW (ref 11.5–15.5)
IMM GRANULOCYTES NFR BLD AUTO: 0.3 % — SIGNIFICANT CHANGE UP (ref 0–1.5)
LYMPHOCYTES # BLD AUTO: 0.78 K/UL — LOW (ref 1–3.3)
LYMPHOCYTES # BLD AUTO: 8.3 % — LOW (ref 13–44)
MCHC RBC-ENTMCNC: 25.7 PG — LOW (ref 27–34)
MCHC RBC-ENTMCNC: 29.6 GM/DL — LOW (ref 32–36)
MCV RBC AUTO: 86.7 FL — SIGNIFICANT CHANGE UP (ref 80–100)
MONOCYTES # BLD AUTO: 0.56 K/UL — SIGNIFICANT CHANGE UP (ref 0–0.9)
MONOCYTES NFR BLD AUTO: 5.9 % — SIGNIFICANT CHANGE UP (ref 2–14)
NEUTROPHILS # BLD AUTO: 8 K/UL — HIGH (ref 1.8–7.4)
NEUTROPHILS NFR BLD AUTO: 84.7 % — HIGH (ref 43–77)
NRBC # BLD: 0 /100 WBCS — SIGNIFICANT CHANGE UP (ref 0–0)
PLATELET # BLD AUTO: 246 K/UL — SIGNIFICANT CHANGE UP (ref 150–400)
POTASSIUM SERPL-MCNC: 4.1 MMOL/L — SIGNIFICANT CHANGE UP (ref 3.5–5.3)
POTASSIUM SERPL-SCNC: 4.1 MMOL/L — SIGNIFICANT CHANGE UP (ref 3.5–5.3)
PROT SERPL-MCNC: 6.2 G/DL — SIGNIFICANT CHANGE UP (ref 6–8.3)
RBC # BLD: 3.23 M/UL — LOW (ref 3.8–5.2)
RBC # FLD: 21 % — HIGH (ref 10.3–14.5)
SARS-COV-2 RNA SPEC QL NAA+PROBE: SIGNIFICANT CHANGE UP
SODIUM SERPL-SCNC: 142 MMOL/L — SIGNIFICANT CHANGE UP (ref 135–145)
WBC # BLD: 9.45 K/UL — SIGNIFICANT CHANGE UP (ref 3.8–10.5)
WBC # FLD AUTO: 9.45 K/UL — SIGNIFICANT CHANGE UP (ref 3.8–10.5)

## 2022-05-28 PROCEDURE — 99233 SBSQ HOSP IP/OBS HIGH 50: CPT

## 2022-05-28 PROCEDURE — 92933 PRQ TRLML C ATHRC ST ANGIOP1: CPT

## 2022-05-28 RX ORDER — FOLIC ACID 0.8 MG
1 TABLET ORAL DAILY
Refills: 0 | Status: DISCONTINUED | OUTPATIENT
Start: 2022-05-28 | End: 2022-06-01

## 2022-05-28 RX ADMIN — CHLORHEXIDINE GLUCONATE 1 APPLICATION(S): 213 SOLUTION TOPICAL at 06:41

## 2022-05-28 RX ADMIN — Medication 1 APPLICATION(S): at 14:54

## 2022-05-28 RX ADMIN — PANTOPRAZOLE SODIUM 40 MILLIGRAM(S): 20 TABLET, DELAYED RELEASE ORAL at 11:53

## 2022-05-28 RX ADMIN — Medication 1 TABLET(S): at 11:53

## 2022-05-28 RX ADMIN — Medication 1 MILLIGRAM(S): at 21:24

## 2022-05-28 RX ADMIN — Medication 2: at 06:12

## 2022-05-28 RX ADMIN — INSULIN GLARGINE 5 UNIT(S): 100 INJECTION, SOLUTION SUBCUTANEOUS at 08:44

## 2022-05-28 RX ADMIN — Medication 1 TABLET(S): at 17:03

## 2022-05-28 RX ADMIN — NYSTATIN CREAM 1 APPLICATION(S): 100000 CREAM TOPICAL at 06:41

## 2022-05-28 RX ADMIN — Medication 1 MILLIGRAM(S): at 02:09

## 2022-05-28 RX ADMIN — NYSTATIN CREAM 1 APPLICATION(S): 100000 CREAM TOPICAL at 14:55

## 2022-05-28 RX ADMIN — CHLORHEXIDINE GLUCONATE 15 MILLILITER(S): 213 SOLUTION TOPICAL at 17:03

## 2022-05-28 RX ADMIN — HEPARIN SODIUM 5000 UNIT(S): 5000 INJECTION INTRAVENOUS; SUBCUTANEOUS at 14:56

## 2022-05-28 RX ADMIN — Medication 1 MILLIGRAM(S): at 06:12

## 2022-05-28 RX ADMIN — HEPARIN SODIUM 5000 UNIT(S): 5000 INJECTION INTRAVENOUS; SUBCUTANEOUS at 21:24

## 2022-05-28 RX ADMIN — CHLORHEXIDINE GLUCONATE 15 MILLILITER(S): 213 SOLUTION TOPICAL at 06:40

## 2022-05-28 RX ADMIN — Medication 1 MILLIGRAM(S): at 17:03

## 2022-05-28 RX ADMIN — Medication 2: at 17:08

## 2022-05-28 RX ADMIN — Medication 1 MILLIGRAM(S): at 14:56

## 2022-05-28 RX ADMIN — Medication 1 APPLICATION(S): at 11:54

## 2022-05-28 RX ADMIN — Medication 1 MILLIGRAM(S): at 11:53

## 2022-05-28 RX ADMIN — HEPARIN SODIUM 5000 UNIT(S): 5000 INJECTION INTRAVENOUS; SUBCUTANEOUS at 06:13

## 2022-05-28 NOTE — PROGRESS NOTE ADULT - SUBJECTIVE AND OBJECTIVE BOX
Chief Complaint: Respiratory distress    Interval Events: No events overnight.    Review of Systems:  Unable to obtain    Physical Exam:  Vital Signs Last 24 Hrs  T(C): 37.8 (28 May 2022 08:14), Max: 37.8 (28 May 2022 08:14)  T(F): 100 (28 May 2022 08:14), Max: 100 (28 May 2022 08:14)  HR: 78 (28 May 2022 08:14) (72 - 99)  BP: 107/49 (28 May 2022 08:00) (97/64 - 125/61)  BP(mean): 67 (28 May 2022 08:00) (66 - 86)  RR: 26 (28 May 2022 08:00) (21 - 38)  SpO2: 96% (28 May 2022 08:14) (92% - 100%)  General: NAD  HEENT: Trach  Neck: No JVD, no carotid bruit  Lungs: Coarse bilaterally  CV: RRR, nl S1/S2, no M/R/G  Abdomen: S/NT/ND, +BS  Extremities: No LE edema, no cyanosis  Neuro: AAOx0  Skin: No rash    Labs:    05-28    142  |  108  |  26<H>  ----------------------------<  177<H>  4.1   |  25  |  1.26    Ca    8.3<L>      28 May 2022 07:05    TPro  6.2  /  Alb  1.7<L>  /  TBili  0.6  /  DBili  x   /  AST  16  /  ALT  34  /  AlkPhos  127<H>  05-28                        8.3    9.45  )-----------( 246      ( 28 May 2022 07:05 )             28.0       Telemetry: Sinus rhythm

## 2022-05-28 NOTE — PROGRESS NOTE ADULT - ASSESSMENT
This is an 78 y/o F with PMH of HTN, DM type 2, Cardiac Arrest, CVA, COPD, Chronic Respiratory Failure Vent Dependant s/p trach & PEG, Multiple Hospital Admissions for Aspiration & vent associated PNA, COVID-19 Infection, Anemia with GI blood loss, GERD, and Advanced Dementia who was sent from [facility] facility for acute respiratory distress.     Sepsis 2/2 VAP/PNA c/b pleural effusions  Pleural Effusions: parapneumonic vs. empyema    - prior sputum 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  - s/p IR guided thoracentesis on 5/25    --pleural fluid cx NGTD  - repeat 5/25 CT chest w/ improved aeration    C/w avycaz for now, plan for x7 day course w/ last day 5/28  S/p linezolid  C/w vent management per ICU    Thank you for consulting us and involving us in the management of this most interesting and challenging case.  We will follow along in the care of this patient. Please call us at [phone] or text me directly on my cell# at [phone] with any concerns.

## 2022-05-28 NOTE — PROGRESS NOTE ADULT - SUBJECTIVE AND OBJECTIVE BOX
Date/Time Patient Seen:  		  Referring MD:   Data Reviewed	       Patient is a 76y old  Female who presents with a chief complaint of Acute respiratory distress. (27 May 2022 13:38)      Subjective/HPI     PAST MEDICAL & SURGICAL HISTORY:  Dementia of frontal lobe type    Aphasic stroke    Diabetes mellitus    Respiratory failure    Hypertension    GERD (gastroesophageal reflux disease)    Constipation    Respiratory failure    CVA (cerebral vascular accident)    HTN (hypertension)    DM (diabetes mellitus)    Advanced dementia    COVID-19 virus detected    Quadriplegia    Pneumonia    Type II diabetes mellitus    Hx of appendectomy    Gastrostomy in place    Tracheostomy in place    Tracheostomy tube present    Feeding by G-tube          Medication list         MEDICATIONS  (STANDING):  ceftazidime/avibactam IVPB 1.25 Gram(s) IV Intermittent every 12 hours  chlorhexidine 0.12% Liquid 15 milliLiter(s) Oral Mucosa every 12 hours  chlorhexidine 2% Cloths 1 Application(s) Topical <User Schedule>  chlorhexidine 4% Liquid 1 Application(s) Topical <User Schedule>  collagenase Ointment 1 Application(s) Topical daily  dextrose 5%. 1000 milliLiter(s) (100 mL/Hr) IV Continuous <Continuous>  dextrose 5%. 1000 milliLiter(s) (50 mL/Hr) IV Continuous <Continuous>  dextrose 50% Injectable 25 Gram(s) IV Push once  dextrose 50% Injectable 12.5 Gram(s) IV Push once  dextrose 50% Injectable 25 Gram(s) IV Push once  glucagon  Injectable 1 milliGRAM(s) IntraMuscular once  heparin   Injectable 5000 Unit(s) SubCutaneous every 8 hours  insulin glargine Injectable (LANTUS) 5 Unit(s) SubCutaneous every morning  insulin lispro (ADMELOG) corrective regimen sliding scale   SubCutaneous every 6 hours  lactobacillus acidophilus 1 Tablet(s) Oral daily  LORazepam     Tablet 1 milliGRAM(s) Oral every 4 hours  mineral oil/petrolatum Hydrophilic Ointment 1 Application(s) Topical daily  nystatin Powder 1 Application(s) Topical three times a day  pantoprazole  Injectable 40 milliGRAM(s) IV Push daily  povidone iodine 10% Solution 1 Application(s) Topical daily    MEDICATIONS  (PRN):  acetaminophen    Suspension .. 650 milliGRAM(s) Oral every 6 hours PRN Temp greater or equal to 38C (100.4F), Mild Pain (1 - 3)  albuterol/ipratropium for Nebulization 3 milliLiter(s) Nebulizer every 6 hours PRN Shortness of Breath and/or Wheezing  dextrose Oral Gel 15 Gram(s) Oral once PRN Blood Glucose LESS THAN 70 milliGRAM(s)/deciliter  LORazepam   Injectable 1 milliGRAM(s) IV Push every 6 hours PRN agitation/vent dyssynchrony  sodium chloride 0.9% lock flush 10 milliLiter(s) IV Push every 1 hour PRN Pre/post blood products, medications, blood draw, and to maintain line patency         Vitals log        ICU Vital Signs Last 24 Hrs  T(C): 36.7 (28 May 2022 06:09), Max: 37.1 (27 May 2022 08:47)  T(F): 98 (28 May 2022 06:09), Max: 98.8 (27 May 2022 08:47)  HR: 80 (28 May 2022 06:00) (72 - 99)  BP: 108/52 (28 May 2022 06:00) (97/64 - 125/61)  BP(mean): 70 (28 May 2022 06:00) (66 - 86)  ABP: --  ABP(mean): --  RR: 26 (28 May 2022 06:00) (21 - 38)  SpO2: 97% (28 May 2022 06:00) (92% - 100%)       Mode: AC/ CMV (Assist Control/ Continuous Mandatory Ventilation)  RR (machine): 26  TV (machine): 400  FiO2: 30  PEEP: 5  ITime: 1  MAP: 17  PIP: 36      Input and Output:  I&O's Detail    27 May 2022 07:01  -  28 May 2022 07:00  --------------------------------------------------------  IN:    Glucerna 1.5: 1300 mL    IV PiggyBack: 200 mL  Total IN: 1500 mL    OUT:    Voided (mL): 1000 mL  Total OUT: 1000 mL    Total NET: 500 mL          Lab Data                        8.3    9.45  )-----------( 246      ( 28 May 2022 07:05 )             28.0     05-28    142  |  108  |  26<H>  ----------------------------<  177<H>  4.1   |  25  |  1.26    Ca    8.3<L>      28 May 2022 07:05    TPro  6.2  /  Alb  x   /  TBili  0.6  /  DBili  x   /  AST  16  /  ALT  34  /  AlkPhos  127<H>  05-28            Review of Systems	      Objective     Physical Examination    heart s1s2  lung dec BS      Pertinent Lab findings & Imaging      Annalise:  NO   Adequate UO     I&O's Detail    27 May 2022 07:01  -  28 May 2022 07:00  --------------------------------------------------------  IN:    Glucerna 1.5: 1300 mL    IV PiggyBack: 200 mL  Total IN: 1500 mL    OUT:    Voided (mL): 1000 mL  Total OUT: 1000 mL    Total NET: 500 mL               Discussed with:     Cultures:	        Radiology

## 2022-05-28 NOTE — PROGRESS NOTE ADULT - ASSESSMENT
Pt is a 77 y/o F pmhx of HTN, DM2, cardiac arrest resulting in chronic respiratory failure requiring trach and peg, CVA, COPD admitted to [facility] w/ acute hypoxic respiratory failure, septic shock secondary to a suspected VAP.     1. Acute on chronic hypoxic respiratory failure   2. Pleural effusion    Plan:   - On full vent support 26/400/30/5, repeat ABG in AM.   - s/p Thoracentesis w/ removal of 1500cc on 5/25   - Finished course of Ceftazadime, no signs of infection, WBC wnl, ID for further recommendations.   - tube feeds to goal  - Heparin SQ for DVT ppx   - Lantus and ISS for glucose control   - Ativan PRN for agitation  - Protonix for GI PPX

## 2022-05-28 NOTE — PROGRESS NOTE ADULT - SUBJECTIVE AND OBJECTIVE BOX
Patient is a 76y old  Female who presents with a chief complaint of Acute respiratory distress. (28 May 2022 08:49)      INTERVAL HPI/OVERNIGHT EVENTS: Patient seen and examined at bedside. No overnight events    MEDICATIONS  (STANDING):  ceftazidime/avibactam IVPB 1.25 Gram(s) IV Intermittent every 12 hours  chlorhexidine 0.12% Liquid 15 milliLiter(s) Oral Mucosa every 12 hours  chlorhexidine 2% Cloths 1 Application(s) Topical <User Schedule>  chlorhexidine 4% Liquid 1 Application(s) Topical <User Schedule>  collagenase Ointment 1 Application(s) Topical daily  dextrose 5%. 1000 milliLiter(s) (100 mL/Hr) IV Continuous <Continuous>  dextrose 5%. 1000 milliLiter(s) (50 mL/Hr) IV Continuous <Continuous>  dextrose 50% Injectable 25 Gram(s) IV Push once  dextrose 50% Injectable 12.5 Gram(s) IV Push once  dextrose 50% Injectable 25 Gram(s) IV Push once  glucagon  Injectable 1 milliGRAM(s) IntraMuscular once  heparin   Injectable 5000 Unit(s) SubCutaneous every 8 hours  insulin glargine Injectable (LANTUS) 5 Unit(s) SubCutaneous every morning  insulin lispro (ADMELOG) corrective regimen sliding scale   SubCutaneous every 6 hours  lactobacillus acidophilus 1 Tablet(s) Oral daily  LORazepam     Tablet 1 milliGRAM(s) Oral every 4 hours  mineral oil/petrolatum Hydrophilic Ointment 1 Application(s) Topical daily  nystatin Powder 1 Application(s) Topical three times a day  pantoprazole  Injectable 40 milliGRAM(s) IV Push daily  povidone iodine 10% Solution 1 Application(s) Topical daily    MEDICATIONS  (PRN):  acetaminophen    Suspension .. 650 milliGRAM(s) Oral every 6 hours PRN Temp greater or equal to 38C (100.4F), Mild Pain (1 - 3)  albuterol/ipratropium for Nebulization 3 milliLiter(s) Nebulizer every 6 hours PRN Shortness of Breath and/or Wheezing  dextrose Oral Gel 15 Gram(s) Oral once PRN Blood Glucose LESS THAN 70 milliGRAM(s)/deciliter  LORazepam   Injectable 1 milliGRAM(s) IV Push every 6 hours PRN agitation/vent dyssynchrony  sodium chloride 0.9% lock flush 10 milliLiter(s) IV Push every 1 hour PRN Pre/post blood products, medications, blood draw, and to maintain line patency      Allergies    codeine (Hives)    Intolerances        REVIEW OF SYSTEMS:  Unable to obtain meaningful ROS due to mental status      Vital Signs Last 24 Hrs  T(C): 37.8 (28 May 2022 08:14), Max: 37.8 (28 May 2022 08:14)  T(F): 100 (28 May 2022 08:14), Max: 100 (28 May 2022 08:14)  HR: 78 (28 May 2022 11:01) (73 - 80)  BP: 109/61 (28 May 2022 11:01) (97/64 - 125/61)  BP(mean): 67 (28 May 2022 08:00) (66 - 81)  RR: 26 (28 May 2022 08:00) (21 - 27)  SpO2: 100% (28 May 2022 11:01) (93% - 100%)    PHYSICAL EXAM:  GENERAL: chronically ill appearing, emaciated, NAD, obtunded  HEAD:  atraumatic  EYES: conjunctiva clear  NECK: (+)trach in place, clean  RESPIRATORY:   coarse mechanical breath sounds, vent in place, no gross crackles or rales  CARDIOVASCULAR:  regular rate and rhythm, no murmurs or rubs or gallops, 2+ peripheral pulses  GASTROINTESTINAL:  soft, +PEG in place, clean and dry as well, nondistended, bowel sounds present  EXTREMITIES:     no clubbing or cyanosis or edema  MUSCULOSKELETAL:  (+)diffuse contractures in all joints  NERVOUS SYSTEM: does not respond to pain  SKIN:  necrotic tips of bilateral 1st toes    LABS:                        8.3    9.45  )-----------( 246      ( 28 May 2022 07:05 )             28.0     CBC Full  -  ( 28 May 2022 07:05 )  WBC Count : 9.45 K/uL  Hemoglobin : 8.3 g/dL  Hematocrit : 28.0 %  Platelet Count - Automated : 246 K/uL  Mean Cell Volume : 86.7 fl  Mean Cell Hemoglobin : 25.7 pg  Mean Cell Hemoglobin Concentration : 29.6 gm/dL  Auto Neutrophil # : 8.00 K/uL  Auto Lymphocyte # : 0.78 K/uL  Auto Monocyte # : 0.56 K/uL  Auto Eosinophil # : 0.06 K/uL  Auto Basophil # : 0.02 K/uL  Auto Neutrophil % : 84.7 %  Auto Lymphocyte % : 8.3 %  Auto Monocyte % : 5.9 %  Auto Eosinophil % : 0.6 %  Auto Basophil % : 0.2 %    28 May 2022 07:05    142    |  108    |  26     ----------------------------<  177    4.1     |  25     |  1.26     Ca    8.3        28 May 2022 07:05    TPro  6.2    /  Alb  1.7    /  TBili  0.6    /  DBili  x      /  AST  16     /  ALT  34     /  AlkPhos  127    28 May 2022 07:05        CAPILLARY BLOOD GLUCOSE      POCT Blood Glucose.: 167 mg/dL (28 May 2022 11:51)  POCT Blood Glucose.: 161 mg/dL (28 May 2022 08:41)  POCT Blood Glucose.: 188 mg/dL (28 May 2022 06:05)  POCT Blood Glucose.: 151 mg/dL (27 May 2022 23:06)  POCT Blood Glucose.: 160 mg/dL (27 May 2022 17:16)        Culture - Fungal, Body Fluid (collected 05-25-22 @ 10:39)  Source: .Body Fluid Pleural Fluid  Preliminary Report (05-26-22 @ 06:53):    Testing in progress    Culture - Body Fluid with Gram Stain (collected 05-25-22 @ 10:39)  Source: .Body Fluid Pleural Fluid  Gram Stain (05-25-22 @ 19:50):    polymorphonuclear leukocytes seen    No organisms seen    by cytocentrifuge    Culture - Acid Fast - Body Fluid w/Smear (collected 05-25-22 @ 10:39)  Source: .Body Fluid Pleural Fluid    Culture - Sputum (collected 05-23-22 @ 11:20)  Source: Trach Asp Tracheal Aspirate  Gram Stain (05-23-22 @ 21:29):    Moderate polymorphonuclear leukocytes per low power field    No Squamous epithelial cells per low power field    Moderate Gram Negative Rods per oil power field  Final Report (05-25-22 @ 16:10):    Numerous Mixed gram negative rods including    Numerous Pseudomonas aeruginosa (Carbapenem Resistant)    Normal Respiratory Elvira absent  Organism: Pseudomonas aeruginosa (Carbapenem Resistant) (05-25-22 @ 16:10)  Organism: Pseudomonas aeruginosa (Carbapenem Resistant) (05-25-22 @ 16:10)      -  Amikacin: S <=16      -  Aztreonam: S <=4      -  Cefepime: S 4      -  Ceftazidime: S 8      -  Ciprofloxacin: S <=0.25      -  Gentamicin: S <=2      -  Imipenem: R >8      -  Levofloxacin: S <=0.5      -  Meropenem: R 8      -  Piperacillin/Tazobactam: S 16      -  Tobramycin: S <=2      Method Type: PRATIK    Culture - Blood (collected 05-21-22 @ 21:57)  Source: .Blood Blood-Peripheral  Final Report (05-27-22 @ 14:01):    No Growth Final    Culture - Blood (collected 05-21-22 @ 21:57)  Source: .Blood Blood-Peripheral  Final Report (05-27-22 @ 14:01):    No Growth Final    Culture - Urine (collected 05-21-22 @ 20:51)  Source: Clean Catch Clean Catch (Midstream)  Final Report (05-23-22 @ 10:12):    <10,000 CFU/mL Normal Urogenital Elvira        RADIOLOGY & ADDITIONAL TESTS: < from: CT Chest No Cont (05.25.22 @ 16:23) >    ACC: [id number] EXAM:  CT CHEST                          PROCEDURE DATE:  05/25/2022          INTERPRETATION:  HISTORY: Admitting Dxs: J18.9 SHORTNESS OF BREATH    EXAMINATION: CT CHEST was performed without IV contrast.    COMPARISON: 5/21/2022.    FINDINGS:    Image quality degraded due to extensive patient motion.    AIRWAYS, LUNGS, PLEURA: Tracheostomy tube in place.    Interval decrease in size of left pleural effusion, now small. Moderate   right pleural effusion without significant change.Interval improvement   of left lower lobe aeration. Left lower lobe patchy opacities. Bilateral   upper lobe ground-glass opacities appear slightly improved. Interlobular   septal thickening.    No pneumothorax.    MEDIASTINUM: Normal heart size. Coronary atherosclerosis. No pericardial   effusion. Thoracic aorta normal caliber.  No large mediastinal lymph   nodes. Right IJ central venous catheter terminates in the lower SVC. No   pneumomediastinum. The esophagus is diffusely patulous.    IMAGED ABDOMEN: Unremarkable.    SOFT TISSUES: Unremarkable.    BONES: L2 compression fracture as on prior exam.      IMPRESSION:.    Image quality degraded due to extensive patient motion. No evidence of   tracheal injury on this limited examination.    No evidence of pneumomediastinum or pneumothorax.    Pulmonary edema appears improved compared to 5/21/2022 on the basis of   decreased left pleural effusion and bilateral upper lobe ground-glass   opacities. Unchanged moderate right pleural effusion.    Interval improvement in aeration of the left lower lobe. Persistent   patchy left lower lobe opacities may be on the basis of edema or   infection.    --- End of Report ---             SHAI KENT MD; Attending Radiologist  This document has been electronically signed. May 25 2022  4:42PM    < end of copied text >      Consultant(s) Notes Reviewed:  [x] YES  [ ] NO    Care Discussed with [x] Consultants  [x] Patient  [ ] Family  [ ]      [ x] Other; RN  DVT ppx

## 2022-05-28 NOTE — PROGRESS NOTE ADULT - ASSESSMENT
80 y/o F with PMH of HTN, DM type 2, Cardiac Arrest, CVA, COPD, Chronic Respiratory Failure Vent Dependant s/p trach & PEG, Multiple Hospital Admissions for Aspiration & vent associated PNA, COVID-19 Infection, Anemia with GI blood loss, GERD, and Advanced Dementia presented with acute respiratory distress.    ct chest reviewed  vs noted  labs reviewed    HCP Marciano -  - pt is full code  on emp ABX - broad spectrum for poss PNA  cvs rx regimen  bronchodilators  oral and skin care  assist with needs - ADL  monitor VS and HD  CT imaging reviewed  Old records reviewed  suction PRN  trach care  peg care  nutritional optimization  decubitus prevention

## 2022-05-28 NOTE — PROGRESS NOTE ADULT - ASSESSMENT
REVIEW OF SYMPTOMS      Able to give (reliable) ROS  NO     PHYSICAL EXAM    HEENT Unremarkable  atraumatic   RESP Fair air entry EXP prolonged    Harsh breath sound Resp distres mild   CARDIAC S1 S2 No S3     NO JVD    ABDOMEN SOFT BS PRESENT NOT DISTENDED No hepatosplenomegaly   PEDAL EDEMA present No calf tenderness  NO rash       AGE/SEX.   76 f    DOA.  5/21/2022     CC .  5/21/2022 AOC RESP FAILURE     PMH .  pmh Hosp stay given zosyn 4/21  pmh Pneumonia    pmh HTN,   pmh DM,   pmh CVA,   pmh  chronic respiratory failure   pmh s/p trach, PEG,   pmh cardiac arrest        MAIN ISSUES  .  TRACH PERF REPORTED BY RADIO 5/25 RULED OUT ON CT    SEVERE HYPOXIC RESP  FAILURE poa   VAP poa   SHOCK poa   BL PL EFFSNS   5/25/2022 Thoracentesis  CHF  PULM HYTN   ANEMIA 5/23/2022 Hb 6.8   ELEVATED LFTS       COVID/ICU/CODE STATUS.                       COVID  STATUS. 5/21/2022 scv2 (-)           ICU STAY. 5/21  GOC.  5/24/2022 full code    BEST PRACTICE ISSUES.                                                  HEAD OF BED ELEVATION. Yes  DVT PROPHYLAXIS.     5/21/2022 hpsc    SQUIRES PROPHYLAXIS.5/22/2022 protonix 40   INFECTION PROPHYLAXIS.   5/21/2022 Ch;lorhexidine .12%   5/21/2022 Chlorhexidine 2%                                                                                         DIET.   5/22/2022 jevity 1.5 1200 gt     VITALS/PO/IO/VENT/DRIPS.   5/28/2022 100 78 100/60   5/28/2022 2p 26/400/5/.3      PROBLEM DATA/ASSESSMENT RECOMMENDATIONS (A/R).    HEMODYNAMICS.   Monitor and optimize bp   Target MAP to miguel  65 (+)    RESP.   Monitor and optimize  po   Target po to remain 90-95%    ABG.  5/23/2022 5a ac 26/400/10/70%  755/34/81   5/22/2022 5a vent 90% 744/41/117   5/21/2022 8p 100% vent 739/50/48    PMH/PSH PROBLEMS.  Management continued/modified as indicated      VENT MANAGEMENT.   HOB elevation  Target Pplat 30 (-)  Target PO 90-95%  Target pH 730 (+)  Daily spontaneous breathing trials   Daily sedation vacation         DIARRHEA.  5/23/2022 c diff (-)     PICC poa   5/23/2022 Ordered to dc picc    INFECTION SOURCE DD.   INFECTION A/R.   Has ho crp   Has ho iv abio within last 90 d  Is on bsab   id eval Dr BRITTANY Brantley appreciated   Started on ceftazidime avibactam 1.25x2 5/22/2022      INFECTION AUGUST.  W 5/21-5/22-5/23-5/24-5/25-5/26-5/28/2022  w 20 - 13 - 13-14 - 11-11.2 - 9.4   ua 5/21/2022 w 3-5   ct ch 5/21/2022 new bl ggo patchy cpnsolidatnand septal thickening of uncertain etiology   No change bl effsns l greater   Near complete consolidatn both lower loobes       MICROBIO.  Rvp 5/21/2022 (-)   urine c 5/21 (-)   blod c 5/21 (-)   sputum 5/23 numerous pseudomonas   c diff 5/23/2022 c diff (-)   mrsa 5/24 (-)   legn 5/26 (-)   PAST MICROBIO.  4/24/2022 sputum CRABAPENEM RESISTANT PSEUDOMONAS  ABIO.  5/22/2022 ceftazidime avibactam 1.25x2     SEVERE HYPOXIC RESP FAILURE poa.   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  V duplx 5/23/2022 (-)     COPD.  5/22/2022 duoneb     PLEURAL EFFSNS.  echo 9/8/2021   n lvsf  mild dd  n rvsf  rvsp 51   ct 4/22/2022 ct ch 4/21/2022   bl effsns increased in size cw 1/2022 5/25/2022 1.5 l clear pl effsn removed left side IR  5/25/2022 g 183 l 144/381 .37 p 3.4/5.3 .64 p 23 l 36   5/25/2022l pl fluid  lymph pred exudate   cyto (-)         MI.  5/23-5/24/2022 Tr 258-155     CHF.  echo 9/8/2021   n lvsf   mild dd   n rvsf   rvsp 51   bnp 5/21/2022 bnp [id number]   5/22/2022 cardio consulted      ANEMIA.   Hb 5/21-5/22-5/23-5/24-5/25-5/26-5/27-5/28/2022   Hb 8 - 7.2 - 6.8 - 8 - 7.8-7.5 - 8.4 - 8.3   monitor  target hb 7 (+)     TRANSFUSION.  5/23/2022 1 u prbc    RYAN.  Na 5/21/2022 Na 140  CO2 5/21/2022 CO2 30   Cr 5/21-5/22-5/23-5/24-5/25-5/26-5/27-5/28/2022   Cr 1.6 - 1.5- 1.4 - 1.4-1.4 - 1.2 - 1.3 - 1.2    monitor     ELEVATED LFTS.  LFTS 5/22-5/23-5/24-5/26/2022  - 136-124-120   - 104-47-23   - 149 - 105-65   5/22/2022 us abd fibrofatty liver dis borderline gbw thick tr perichole fluid   5/22/2022  hep panel (-)       TIME SPENT   Over 36  minutes aggregate critical care time spent on encounter; activities included   direct patient care, counseling and/or coordinating care reviewing notes, lab data/ imaging , discussion with multidisciplinary team/ patient  /family and explaining in detail risks, benefits, alternatives  of the recommendations     CHAPINCITO GUIDRY 76 f NW S 5/21/2022

## 2022-05-28 NOTE — PROGRESS NOTE ADULT - SUBJECTIVE AND OBJECTIVE BOX
Patient is a 76y old  Female who presents with a chief complaint of Acute respiratory distress. (28 May 2022 14:16)      BRIEF HOSPITAL COURSE: Pt is a 77 y/o F pmhx of HTN, DM2, cardiac arrest resulting in chronic respiratory failure requiring trach and peg, CVA, COPD admitted to [facility] w/ acute hypoxic respiratory failure, septic shock secondary to a suspected VAP.     Events last 24 hours: Thoracentesis on 5/25, last day of ceftazadime as per ID.     PAST MEDICAL & SURGICAL HISTORY:  Dementia of frontal lobe type      Aphasic stroke      Diabetes mellitus      Respiratory failure      Hypertension      GERD (gastroesophageal reflux disease)      Constipation      Respiratory failure      CVA (cerebral vascular accident)      HTN (hypertension)      DM (diabetes mellitus)      Advanced dementia      COVID-19 virus detected      Quadriplegia      Pneumonia      Type II diabetes mellitus      Hx of appendectomy      Gastrostomy in place      Tracheostomy in place      Tracheostomy tube present      Feeding by G-tube          Review of Systems:  Unable to assess secondary to mentation       Medications:  albuterol/ipratropium for Nebulization 3 milliLiter(s) Nebulizer every 6 hours PRN    acetaminophen    Suspension .. 650 milliGRAM(s) Oral every 6 hours PRN  LORazepam     Tablet 1 milliGRAM(s) Oral every 4 hours  LORazepam   Injectable 1 milliGRAM(s) IV Push every 6 hours PRN      heparin   Injectable 5000 Unit(s) SubCutaneous every 8 hours    pantoprazole  Injectable 40 milliGRAM(s) IV Push daily      dextrose 50% Injectable 25 Gram(s) IV Push once  dextrose 50% Injectable 12.5 Gram(s) IV Push once  dextrose 50% Injectable 25 Gram(s) IV Push once  dextrose Oral Gel 15 Gram(s) Oral once PRN  glucagon  Injectable 1 milliGRAM(s) IntraMuscular once  insulin glargine Injectable (LANTUS) 5 Unit(s) SubCutaneous every morning  insulin lispro (ADMELOG) corrective regimen sliding scale   SubCutaneous every 6 hours    dextrose 5%. 1000 milliLiter(s) IV Continuous <Continuous>  dextrose 5%. 1000 milliLiter(s) IV Continuous <Continuous>  folic acid 1 milliGRAM(s) Oral daily  multivitamin 1 Tablet(s) Oral daily  sodium chloride 0.9% lock flush 10 milliLiter(s) IV Push every 1 hour PRN      chlorhexidine 0.12% Liquid 15 milliLiter(s) Oral Mucosa every 12 hours  chlorhexidine 2% Cloths 1 Application(s) Topical <User Schedule>  chlorhexidine 4% Liquid 1 Application(s) Topical <User Schedule>  collagenase Ointment 1 Application(s) Topical daily  mineral oil/petrolatum Hydrophilic Ointment 1 Application(s) Topical daily  nystatin Powder 1 Application(s) Topical three times a day  povidone iodine 10% Solution 1 Application(s) Topical daily    lactobacillus acidophilus 1 Tablet(s) Oral daily      Mode: AC/ CMV (Assist Control/ Continuous Mandatory Ventilation)  RR (machine): 26  TV (machine): 400  FiO2: 30  PEEP: 5  ITime: 1  MAP: 15  PIP: 31      ICU Vital Signs Last 24 Hrs  T(C): 36.4 (28 May 2022 20:10), Max: 37.8 (28 May 2022 08:14)  T(F): 97.6 (28 May 2022 20:10), Max: 100 (28 May 2022 08:14)  HR: 85 (28 May 2022 19:25) (74 - 103)  BP: 113/55 (28 May 2022 17:49) (97/64 - 122/57)  BP(mean): 73 (28 May 2022 17:49) (67 - 77)  ABP: --  ABP(mean): --  RR: 27 (28 May 2022 17:49) (26 - 27)  SpO2: 93% (28 May 2022 19:25) (92% - 100%)          I&O's Detail    27 May 2022 07:01  -  28 May 2022 07:00  --------------------------------------------------------  IN:    Glucerna 1.5: 1300 mL    IV PiggyBack: 200 mL  Total IN: 1500 mL    OUT:    Voided (mL): 1000 mL  Total OUT: 1000 mL    Total NET: 500 mL      28 May 2022 07:01  -  28 May 2022 21:25  --------------------------------------------------------  IN:    Free Water: 350 mL    Glucerna 1.5: 660 mL  Total IN: 1010 mL    OUT:    Voided (mL): 500 mL  Total OUT: 500 mL    Total NET: 510 mL            LABS:                        8.3    9.45  )-----------( 246      ( 28 May 2022 07:05 )             28.0     05-28    142  |  108  |  26<H>  ----------------------------<  177<H>  4.1   |  25  |  1.26    Ca    8.3<L>      28 May 2022 07:05    TPro  6.2  /  Alb  1.7<L>  /  TBili  0.6  /  DBili  x   /  AST  16  /  ALT  34  /  AlkPhos  127<H>  05-28          CAPILLARY BLOOD GLUCOSE      POCT Blood Glucose.: 176 mg/dL (28 May 2022 17:06)        CULTURES:  Culture Results:   Culture is being performed. (05-25-22 @ 10:39)  Culture Results:   Testing in progress (05-25-22 @ 10:39)  C Diff by PCR Result: NotDetec (05-23-22 @ 12:22)  Culture Results:   Numerous Mixed gram negative rods including  Numerous Pseudomonas aeruginosa (Carbapenem Resistant)  Normal Respiratory Elvira absent (05-23-22 @ 11:20)  Culture Results:   No Growth Final (05-21-22 @ 21:57)  Culture Results:   No Growth Final (05-21-22 @ 21:57)      Physical Examination:    General: Laying in hospital bed in no acute distress.  Unresponsive on vent, contracted on all 4 extremities.     HEENT: Pupils equal, reactive to light.  Symmetric.    PULM: Clear to auscultation bilaterally, no significant sputum production    CVS: Regular rate and rhythm.     ABD: Soft, nondistended, nontender, normoactive bowel sounds, no masses    EXT: No edema, nontender, contracted     SKIN: Warm and well perfused    NEURO: Unresponsive on vent, responsive to painful stimuli       RADIOLOGY:   < from: CT Chest No Cont (05.25.22 @ 16:23) >  ACC: [id number] EXAM:  CT CHEST                          PROCEDURE DATE:  05/25/2022          INTERPRETATION:  HISTORY: Admitting Dxs: J18.9 SHORTNESS OF BREATH    EXAMINATION: CT CHEST was performed without IV contrast.    COMPARISON: 5/21/2022.    FINDINGS:    Image quality degraded due to extensive patient motion.    AIRWAYS, LUNGS, PLEURA: Tracheostomy tube in place.    Interval decrease in size of left pleural effusion, now small. Moderate   right pleural effusion without significant change.Interval improvement   of left lower lobe aeration. Left lower lobe patchy opacities. Bilateral   upper lobe ground-glass opacities appear slightly improved. Interlobular   septal thickening.    No pneumothorax.    MEDIASTINUM: Normal heart size. Coronary atherosclerosis. No pericardial   effusion. Thoracic aorta normal caliber.  No large mediastinal lymph   nodes. Right IJ central venous catheter terminates in the lower SVC. No   pneumomediastinum. The esophagus is diffusely patulous.    IMAGED ABDOMEN: Unremarkable.    SOFT TISSUES: Unremarkable.    BONES: L2 compression fracture as on prior exam.      IMPRESSION:.    Image quality degraded due to extensive patient motion. No evidence of   tracheal injury on this limited examination.    No evidence of pneumomediastinum or pneumothorax.    Pulmonary edema appears improved compared to 5/21/2022 on the basis of   decreased left pleural effusion and bilateral upper lobe ground-glass   opacities. Unchanged moderate right pleural effusion.    Interval improvement in aeration of the left lower lobe. Persistent   patchy left lower lobe opacities may be on the basis of edema or   infection.    --- End of Report ---

## 2022-05-28 NOTE — PROGRESS NOTE ADULT - SUBJECTIVE AND OBJECTIVE BOX
[facility] DIVISION of INFECTIOUS DISEASE  Arturo Olivas MD PhD, Haylee Umanzor MD, Andressa Brantley MD, Billy Valenzuela MD, Emil Medellin MD  and providing coverage with Eusebio Chairez MD  Providing Infectious Disease Consultations at [facility], [facility], [facility]'s    Office# [phone] to schedule follow up appointments  Answering Service for urgent calls or New Consults [phone]  Cell# to text for urgent issues Arturo Olivas [phone]     infectious diseases progress note:    BREA BECKHAM is a 76y y. o. Female patient    No concerning overnight events    Allergies    codeine (Hives)    Intolerances        ANTIBIOTICS/RELEVANT:  antimicrobials  ceftazidime/avibactam IVPB 1.25 Gram(s) IV Intermittent every 12 hours    immunologic:    OTHER:  acetaminophen    Suspension .. 650 milliGRAM(s) Oral every 6 hours PRN  albuterol/ipratropium for Nebulization 3 milliLiter(s) Nebulizer every 6 hours PRN  chlorhexidine 0.12% Liquid 15 milliLiter(s) Oral Mucosa every 12 hours  chlorhexidine 2% Cloths 1 Application(s) Topical <User Schedule>  chlorhexidine 4% Liquid 1 Application(s) Topical <User Schedule>  collagenase Ointment 1 Application(s) Topical daily  dextrose 5%. 1000 milliLiter(s) IV Continuous <Continuous>  dextrose 5%. 1000 milliLiter(s) IV Continuous <Continuous>  dextrose 50% Injectable 25 Gram(s) IV Push once  dextrose 50% Injectable 12.5 Gram(s) IV Push once  dextrose 50% Injectable 25 Gram(s) IV Push once  dextrose Oral Gel 15 Gram(s) Oral once PRN  glucagon  Injectable 1 milliGRAM(s) IntraMuscular once  heparin   Injectable 5000 Unit(s) SubCutaneous every 8 hours  insulin glargine Injectable (LANTUS) 5 Unit(s) SubCutaneous every morning  insulin lispro (ADMELOG) corrective regimen sliding scale   SubCutaneous every 6 hours  lactobacillus acidophilus 1 Tablet(s) Oral daily  LORazepam     Tablet 1 milliGRAM(s) Oral every 4 hours  LORazepam   Injectable 1 milliGRAM(s) IV Push every 6 hours PRN  mineral oil/petrolatum Hydrophilic Ointment 1 Application(s) Topical daily  nystatin Powder 1 Application(s) Topical three times a day  pantoprazole  Injectable 40 milliGRAM(s) IV Push daily  povidone iodine 10% Solution 1 Application(s) Topical daily  sodium chloride 0.9% lock flush 10 milliLiter(s) IV Push every 1 hour PRN      Objective:  Vital Signs Last 24 Hrs  T(C): 37.8 (28 May 2022 08:14), Max: 37.8 (28 May 2022 08:14)  T(F): 100 (28 May 2022 08:14), Max: 100 (28 May 2022 08:14)  HR: 78 (28 May 2022 11:01) (73 - 80)  BP: 109/61 (28 May 2022 11:01) (97/64 - 125/61)  BP(mean): 67 (28 May 2022 08:00) (66 - 81)  RR: 26 (28 May 2022 08:00) (21 - 27)  SpO2: 100% (28 May 2022 11:01) (93% - 100%)    T(C): 37.8 (05-28-22 @ 08:14), Max: 37.8 (05-28-22 @ 08:14)  T(C): 37.8 (05-28-22 @ 08:14), Max: 37.8 (05-28-22 @ 08:14)  T(C): 37.8 (05-28-22 @ 08:14), Max: 37.8 (05-28-22 @ 08:14)    PHYSICAL EXAM:  HEENT: NC atraumatic  Neck: supple, intubated via trach  Respiratory: no accessory muscle use, breathing comfortably  Cardiovascular: distant  Gastrointestinal: normal appearing, nondistended  Extremities: no clubbing, no cyanosis,    Mode: AC/ CMV (Assist Control/ Continuous Mandatory Ventilation), RR (machine): 26, TV (machine): 400, FiO2: 30, PEEP: 5, ITime: 1, MAP: 16, PIP: 32    LABS:                          8.3    9.45  )-----------( 246      ( 28 May 2022 07:05 )             28.0       WBC  9.45 05-28 @ 07:05  11.79 05-27 @ 06:00  11.28 05-26 @ 06:04  11.83 05-25 @ 06:00  14.23 05-24 @ 06:56  13.05 05-23 @ 06:44  13.09 05-22 @ 06:23  21.57 05-22 @ 01:22  20.49 05-21 @ 22:30  22.94 05-21 @ 20:28      05-28    142  |  108  |  26<H>  ----------------------------<  177<H>  4.1   |  25  |  1.26    Ca    8.3<L>      28 May 2022 07:05    TPro  6.2  /  Alb  1.7<L>  /  TBili  0.6  /  DBili  x   /  AST  16  /  ALT  34  /  AlkPhos  127<H>  05-28      Creatinine, Serum: 1.26 mg/dL (05-28-22 @ 07:05)  Creatinine, Serum: 1.32 mg/dL (05-27-22 @ 06:00)  Creatinine, Serum: 1.28 mg/dL (05-26-22 @ 06:04)  Creatinine, Serum: 1.41 mg/dL (05-25-22 @ 06:00)  Creatinine, Serum: 1.48 mg/dL (05-24-22 @ 06:56)  Creatinine, Serum: 1.48 mg/dL (05-23-22 @ 06:44)  Creatinine, Serum: 1.59 mg/dL (05-22-22 @ 06:23)  Creatinine, Serum: 1.72 mg/dL (05-22-22 @ 01:22)  Creatinine, Serum: 1.65 mg/dL (05-21-22 @ 22:30)  Creatinine, Serum: 1.62 mg/dL (05-21-22 @ 20:28)                INFLAMMATORY MARKERS  Auto Neutrophil #: 8.00 K/uL (05-28-22 @ 07:05)  Auto Lymphocyte #: 0.78 K/uL (05-28-22 @ 07:05)  Auto Lymphocyte #: 0.49 K/uL (05-26-22 @ 06:04)  Auto Neutrophil #: 10.06 K/uL (05-26-22 @ 06:04)  Auto Neutrophil #: 11.08 K/uL (05-23-22 @ 06:44)  Auto Lymphocyte #: 1.20 K/uL (05-23-22 @ 06:44)  Auto Neutrophil #: 10.55 K/uL (05-22-22 @ 06:23)  Auto Lymphocyte #: 1.67 K/uL (05-22-22 @ 06:23)  Auto Neutrophil #: 18.42 K/uL (05-21-22 @ 22:30)  Auto Lymphocyte #: 0.82 K/uL (05-21-22 @ 22:30)  Auto Neutrophil #: 20.65 K/uL (05-21-22 @ 20:28)  Auto Lymphocyte #: 1.84 K/uL (05-21-22 @ 20:28)    Lactate, Blood: 1.3 mmol/L (05-23-22 @ 06:44)  Lactate, Blood: 1.6 mmol/L (05-22-22 @ 15:05)  Lactate, Blood: 3.6 mmol/L (05-22-22 @ 10:01)  Lactate, Blood: 3.4 mmol/L (05-22-22 @ 06:23)  Lactate, Blood: 1.9 mmol/L (05-21-22 @ 20:28)    Auto Eosinophil #: 0.06 K/uL (05-28-22 @ 07:05)  Auto Eosinophil #: 0.14 K/uL (05-26-22 @ 06:04)  Auto Eosinophil #: 0.12 K/uL (05-23-22 @ 06:44)  Auto Eosinophil #: 0.04 K/uL (05-22-22 @ 06:23)  Auto Eosinophil #: 0.09 K/uL (05-21-22 @ 22:30)  Auto Eosinophil #: 0.00 K/uL (05-21-22 @ 20:28)    Lactate Dehydrogenase, Serum: 381 U/L (05-24-22 @ 06:56)      Procalcitonin, Serum: 1.40 ng/mL (05-23-22 @ 06:44)            Ferritin, Serum: 565 ng/mL (05-23-22 @ 11:49)        INR: 1.20 ratio (05-24-22 @ 06:56)  INR: 1.29 ratio (05-23-22 @ 06:44)  Activated Partial Thromboplastin Time: 34.5 sec (05-22-22 @ 01:22)  INR: 1.23 ratio (05-22-22 @ 01:22)  Activated Partial Thromboplastin Time: 37.4 sec (05-21-22 @ 20:28)  INR: 1.17 ratio (05-21-22 @ 20:28)          MICROBIOLOGY:              RADIOLOGY & ADDITIONAL STUDIES:

## 2022-05-28 NOTE — PROGRESS NOTE ADULT - SUBJECTIVE AND OBJECTIVE BOX
BREA BECKHAM     [facility] 04    Allergies    codeine (Hives)    Intolerances        PAST MEDICAL & SURGICAL HISTORY:  Dementia of frontal lobe type      Aphasic stroke      Diabetes mellitus      Respiratory failure      Hypertension      GERD (gastroesophageal reflux disease)      Constipation      Respiratory failure      CVA (cerebral vascular accident)      HTN (hypertension)      DM (diabetes mellitus)      Advanced dementia      COVID-19 virus detected      Quadriplegia      Pneumonia      Type II diabetes mellitus      Hx of appendectomy      Gastrostomy in place      Tracheostomy in place      Tracheostomy tube present      Feeding by G-tube          FAMILY HISTORY:  No pertinent family history in first degree relatives        Home Medications:  albuterol 90 mcg/inh inhalation aerosol with adapter: 2  inhaled every 6 hours (21 May 2022 21:16)  Bacid (LAC) oral tablet: 2 tab(s) by gastrostomy tube once a day (21 May 2022 21:16)  Basaglar KwikPen 100 units/mL subcutaneous solution: 36 unit(s) subcutaneous once a day (at bedtime) (21 May 2022 21:16)  Betadine 10% topical swab: cleanse right and left great toes (21 May 2022 21:16)  Carafate 1 g/10 mL oral suspension: 10 milliliter(s) by gastrostomy tube 4 times a day (before meals and at bedtime) for 14 days (Started 6/4/21) (21 May 2022 21:16)  chlorhexidine 0.12% mucous membrane liquid: 15 milliliter(s) mucous membrane 2 times a day (21 May 2022 21:16)  Eucerin topical cream: Apply topically to affected area once a day bilateral feet (21 May 2022 21:16)  folic acid 1 mg oral tablet: 1 tab(s) orally once a day (21 May 2022 21:16)  ipratropium-albuterol 0.5 mg-2.5 mg/3 mL inhalation solution: 3 milliliter(s) inhaled 4 times a day (21 May 2022 21:16)  LORazepam 1 mg oral tablet: 1 tab(s) by gastrostomy tube every 4 hours (21 May 2022 21:16)  methocarbamol 500 mg oral tablet: 1 tab(s) by gastrostomy tube 2 times a day (21 May 2022 21:16)  MiraLax oral powder for reconstitution:  (21 May 2022 23:14)  Multiple Vitamins oral tablet: 1 tab(s) orally once a day (21 May 2022 21:16)  omeprazole 20 mg oral delayed release capsule: orally 2 times a day (21 May 2022 23:14)  polyethylene glycol 3350 oral powder for reconstitution: 17 gram(s) by gastrostomy tube every 12 hours (21 May 2022 21:16)  senna 8.6 mg oral tablet: 3 tab(s) by gastrostomy tube once a day (at bedtime) (21 May 2022 21:16)  simethicone 80 mg oral tablet, chewable: 1 tab(s) by gastrostomy tube every 6 hours (21 May 2022 21:16)  Tylenol 325 mg oral tablet: 2 tab(s) by gastrostomy tube once a day; 60 minutes prior to dressing change  (21 May 2022 21:16)  Tylenol 325 mg oral tablet: 2 tab(s) by gastrostomy tube every 6 hours, As Needed (21 May 2022 21:16)  Zosyn:  (21 May 2022 23:14)      MEDICATIONS  (STANDING):  ceftazidime/avibactam IVPB 1.25 Gram(s) IV Intermittent every 12 hours  chlorhexidine 0.12% Liquid 15 milliLiter(s) Oral Mucosa every 12 hours  chlorhexidine 2% Cloths 1 Application(s) Topical <User Schedule>  chlorhexidine 4% Liquid 1 Application(s) Topical <User Schedule>  collagenase Ointment 1 Application(s) Topical daily  dextrose 5%. 1000 milliLiter(s) (100 mL/Hr) IV Continuous <Continuous>  dextrose 5%. 1000 milliLiter(s) (50 mL/Hr) IV Continuous <Continuous>  dextrose 50% Injectable 25 Gram(s) IV Push once  dextrose 50% Injectable 12.5 Gram(s) IV Push once  dextrose 50% Injectable 25 Gram(s) IV Push once  glucagon  Injectable 1 milliGRAM(s) IntraMuscular once  heparin   Injectable 5000 Unit(s) SubCutaneous every 8 hours  insulin glargine Injectable (LANTUS) 5 Unit(s) SubCutaneous every morning  insulin lispro (ADMELOG) corrective regimen sliding scale   SubCutaneous every 6 hours  lactobacillus acidophilus 1 Tablet(s) Oral daily  LORazepam     Tablet 1 milliGRAM(s) Oral every 4 hours  mineral oil/petrolatum Hydrophilic Ointment 1 Application(s) Topical daily  nystatin Powder 1 Application(s) Topical three times a day  pantoprazole  Injectable 40 milliGRAM(s) IV Push daily  povidone iodine 10% Solution 1 Application(s) Topical daily    MEDICATIONS  (PRN):  acetaminophen    Suspension .. 650 milliGRAM(s) Oral every 6 hours PRN Temp greater or equal to 38C (100.4F), Mild Pain (1 - 3)  albuterol/ipratropium for Nebulization 3 milliLiter(s) Nebulizer every 6 hours PRN Shortness of Breath and/or Wheezing  dextrose Oral Gel 15 Gram(s) Oral once PRN Blood Glucose LESS THAN 70 milliGRAM(s)/deciliter  LORazepam   Injectable 1 milliGRAM(s) IV Push every 6 hours PRN agitation/vent dyssynchrony  sodium chloride 0.9% lock flush 10 milliLiter(s) IV Push every 1 hour PRN Pre/post blood products, medications, blood draw, and to maintain line patency      Diet, NPO with Tube Feed:   Tube Feeding Modality: Gastrostomy  Glucerna 1.5 Horacio  Total Volume for 24 Hours (mL): 1200  Continuous  Starting Tube Feed Rate mL per Hour: 20  Increase Tube Feed Rate by (mL): 10     Every 6 hours  Until Goal Tube Feed Rate (mL per Hour): 60  Tube Feed Duration (in Hours): 20  Tube Feed Start Time: 00:00 (05-22-22 @ 11:42) [Active]          Vital Signs Last 24 Hrs  T(C): 36.7 (28 May 2022 06:09), Max: 37 (27 May 2022 21:06)  T(F): 98 (28 May 2022 06:09), Max: 98.6 (27 May 2022 21:06)  HR: 78 (28 May 2022 08:14) (72 - 99)  BP: 107/49 (28 May 2022 08:00) (97/64 - 125/61)  BP(mean): 67 (28 May 2022 08:00) (66 - 86)  RR: 26 (28 May 2022 08:00) (21 - 38)  SpO2: 96% (28 May 2022 08:14) (92% - 100%)      05-27-22 @ 07:01  -  05-28-22 @ 07:00  --------------------------------------------------------  IN: 1500 mL / OUT: 1000 mL / NET: 500 mL        Mode: AC/ CMV (Assist Control/ Continuous Mandatory Ventilation), RR (machine): 26, TV (machine): 400, FiO2: 30, PEEP: 5, ITime: 1, MAP: 17, PIP: 36      LABS:                        8.3    9.45  )-----------( 246      ( 28 May 2022 07:05 )             28.0     05-28    142  |  108  |  26<H>  ----------------------------<  177<H>  4.1   |  25  |  1.26    Ca    8.3<L>      28 May 2022 07:05    TPro  6.2  /  Alb  1.7<L>  /  TBili  0.6  /  DBili  x   /  AST  16  /  ALT  34  /  AlkPhos  127<H>  05-28              WBC:  WBC Count: 9.45 K/uL (05-28 @ 07:05)  WBC Count: 11.79 K/uL (05-27 @ 06:00)  WBC Count: 11.28 K/uL (05-26 @ 06:04)  WBC Count: 11.83 K/uL (05-25 @ 06:00)      MICROBIOLOGY:  RECENT CULTURES:  05-25 .Body Fluid Pleural Fluid XXXX   polymorphonuclear leukocytes seen  No organisms seen  by cytocentrifuge   Testing in progress    05-23 Trach Asp Tracheal Aspirate Pseudomonas aeruginosa (Carbapenem Resistant)   Moderate polymorphonuclear leukocytes per low power field  No Squamous epithelial cells per low power field  Moderate Gram Negative Rods per oil power field   Numerous Mixed gram negative rods including  Numerous Pseudomonas aeruginosa (Carbapenem Resistant)  Normal Respiratory Elvira absent    05-21 .Blood Blood-Peripheral XXXX XXXX   No Growth Final    05-21 Clean Catch Clean Catch (Midstream) XXXX XXXX   <10,000 CFU/mL Normal Urogenital Elvira                    Sodium:  Sodium, Serum: 142 mmol/L (05-28 @ 07:05)  Sodium, Serum: 139 mmol/L (05-27 @ 06:00)  Sodium, Serum: 142 mmol/L (05-26 @ 06:04)  Sodium, Serum: 143 mmol/L (05-25 @ 06:00)      1.26 mg/dL 05-28 @ 07:05  1.32 mg/dL 05-27 @ 06:00  1.28 mg/dL 05-26 @ 06:04  1.41 mg/dL 05-25 @ 06:00      Hemoglobin:  Hemoglobin: 8.3 g/dL (05-28 @ 07:05)  Hemoglobin: 8.4 g/dL (05-27 @ 06:00)  Hemoglobin: 7.5 g/dL (05-26 @ 06:04)  Hemoglobin: 7.8 g/dL (05-25 @ 06:00)      Platelets: Platelet Count - Automated: 246 K/uL (05-28 @ 07:05)  Platelet Count - Automated: 242 K/uL (05-27 @ 06:00)  Platelet Count - Automated: 243 K/uL (05-26 @ 06:04)  Platelet Count - Automated: 255 K/uL (05-25 @ 06:00)      LIVER FUNCTIONS - ( 28 May 2022 07:05 )  Alb: 1.7 g/dL / Pro: 6.2 g/dL / ALK PHOS: 127 U/L / ALT: 34 U/L DA / AST: 16 U/L / GGT: x                 RADIOLOGY & ADDITIONAL STUDIES:      MICROBIOLOGY:  RECENT CULTURES:  05-25 .Body Fluid Pleural Fluid XXXX   polymorphonuclear leukocytes seen  No organisms seen  by cytocentrifuge   Testing in progress    05-23 Trach Asp Tracheal Aspirate Pseudomonas aeruginosa (Carbapenem Resistant)   Moderate polymorphonuclear leukocytes per low power field  No Squamous epithelial cells per low power field  Moderate Gram Negative Rods per oil power field   Numerous Mixed gram negative rods including  Numerous Pseudomonas aeruginosa (Carbapenem Resistant)  Normal Respiratory Elvira absent    05-21 .Blood Blood-Peripheral XXXX XXXX   No Growth Final    05-21 Clean Catch Clean Catch (Midstream) XXXX XXXX   <10,000 CFU/mL Normal Urogenital Elvira

## 2022-05-29 ENCOUNTER — TRANSCRIPTION ENCOUNTER (OUTPATIENT)
Age: 77
End: 2022-05-29

## 2022-05-29 LAB
ALBUMIN SERPL ELPH-MCNC: 1.8 G/DL — LOW (ref 3.3–5)
ALP SERPL-CCNC: 150 U/L — HIGH (ref 30–120)
ALT FLD-CCNC: 27 U/L DA — SIGNIFICANT CHANGE UP (ref 10–60)
ANION GAP SERPL CALC-SCNC: 10 MMOL/L — SIGNIFICANT CHANGE UP (ref 5–17)
AST SERPL-CCNC: 12 U/L — SIGNIFICANT CHANGE UP (ref 10–40)
BILIRUB SERPL-MCNC: 0.4 MG/DL — SIGNIFICANT CHANGE UP (ref 0.2–1.2)
BUN SERPL-MCNC: 28 MG/DL — HIGH (ref 7–23)
CALCIUM SERPL-MCNC: 8.3 MG/DL — LOW (ref 8.4–10.5)
CHLORIDE SERPL-SCNC: 109 MMOL/L — HIGH (ref 96–108)
CO2 SERPL-SCNC: 24 MMOL/L — SIGNIFICANT CHANGE UP (ref 22–31)
CREAT SERPL-MCNC: 1.13 MG/DL — SIGNIFICANT CHANGE UP (ref 0.5–1.3)
EGFR: 50 ML/MIN/1.73M2 — LOW
GLUCOSE SERPL-MCNC: 150 MG/DL — HIGH (ref 70–99)
HCT VFR BLD CALC: 29.5 % — LOW (ref 34.5–45)
HGB BLD-MCNC: 8.4 G/DL — LOW (ref 11.5–15.5)
INR BLD: 1.23 RATIO — HIGH (ref 0.88–1.16)
LACTATE SERPL-SCNC: 1.6 MMOL/L — SIGNIFICANT CHANGE UP (ref 0.7–2)
MAGNESIUM SERPL-MCNC: 1.6 MG/DL — SIGNIFICANT CHANGE UP (ref 1.6–2.6)
MCHC RBC-ENTMCNC: 25.1 PG — LOW (ref 27–34)
MCHC RBC-ENTMCNC: 28.5 GM/DL — LOW (ref 32–36)
MCV RBC AUTO: 88.3 FL — SIGNIFICANT CHANGE UP (ref 80–100)
NRBC # BLD: 0 /100 WBCS — SIGNIFICANT CHANGE UP (ref 0–0)
PHOSPHATE SERPL-MCNC: 2.9 MG/DL — SIGNIFICANT CHANGE UP (ref 2.5–4.5)
PLATELET # BLD AUTO: 227 K/UL — SIGNIFICANT CHANGE UP (ref 150–400)
POTASSIUM SERPL-MCNC: 4 MMOL/L — SIGNIFICANT CHANGE UP (ref 3.5–5.3)
POTASSIUM SERPL-SCNC: 4 MMOL/L — SIGNIFICANT CHANGE UP (ref 3.5–5.3)
PROCALCITONIN SERPL-MCNC: 0.67 NG/ML — HIGH (ref 0.02–0.1)
PROT SERPL-MCNC: 6.7 G/DL — SIGNIFICANT CHANGE UP (ref 6–8.3)
PROTHROM AB SERPL-ACNC: 14.2 SEC — HIGH (ref 10.5–13.4)
RBC # BLD: 3.34 M/UL — LOW (ref 3.8–5.2)
RBC # FLD: 21.2 % — HIGH (ref 10.3–14.5)
SODIUM SERPL-SCNC: 143 MMOL/L — SIGNIFICANT CHANGE UP (ref 135–145)
WBC # BLD: 11.87 K/UL — HIGH (ref 3.8–10.5)
WBC # FLD AUTO: 11.87 K/UL — HIGH (ref 3.8–10.5)

## 2022-05-29 PROCEDURE — 99233 SBSQ HOSP IP/OBS HIGH 50: CPT

## 2022-05-29 PROCEDURE — 71045 X-RAY EXAM CHEST 1 VIEW: CPT | Mod: 26

## 2022-05-29 RX ORDER — INSULIN GLARGINE 100 [IU]/ML
5 INJECTION, SOLUTION SUBCUTANEOUS
Qty: 0 | Refills: 0 | DISCHARGE
Start: 2022-05-29

## 2022-05-29 RX ORDER — INSULIN GLARGINE 100 [IU]/ML
36 INJECTION, SOLUTION SUBCUTANEOUS
Qty: 0 | Refills: 0 | DISCHARGE

## 2022-05-29 RX ORDER — NYSTATIN CREAM 100000 [USP'U]/G
1 CREAM TOPICAL
Qty: 0 | Refills: 0 | DISCHARGE
Start: 2022-05-29

## 2022-05-29 RX ORDER — PIPERACILLIN AND TAZOBACTAM 4; .5 G/20ML; G/20ML
0 INJECTION, POWDER, LYOPHILIZED, FOR SOLUTION INTRAVENOUS
Qty: 0 | Refills: 0 | DISCHARGE

## 2022-05-29 RX ADMIN — Medication 1 TABLET(S): at 11:23

## 2022-05-29 RX ADMIN — NYSTATIN CREAM 1 APPLICATION(S): 100000 CREAM TOPICAL at 01:42

## 2022-05-29 RX ADMIN — Medication 2: at 05:20

## 2022-05-29 RX ADMIN — Medication 2: at 01:47

## 2022-05-29 RX ADMIN — Medication 1 MILLIGRAM(S): at 13:09

## 2022-05-29 RX ADMIN — Medication 1 MILLIGRAM(S): at 01:43

## 2022-05-29 RX ADMIN — PANTOPRAZOLE SODIUM 40 MILLIGRAM(S): 20 TABLET, DELAYED RELEASE ORAL at 11:23

## 2022-05-29 RX ADMIN — Medication 2: at 17:19

## 2022-05-29 RX ADMIN — INSULIN GLARGINE 5 UNIT(S): 100 INJECTION, SOLUTION SUBCUTANEOUS at 07:31

## 2022-05-29 RX ADMIN — HEPARIN SODIUM 5000 UNIT(S): 5000 INJECTION INTRAVENOUS; SUBCUTANEOUS at 21:11

## 2022-05-29 RX ADMIN — Medication 1 MILLIGRAM(S): at 17:18

## 2022-05-29 RX ADMIN — CHLORHEXIDINE GLUCONATE 1 APPLICATION(S): 213 SOLUTION TOPICAL at 05:15

## 2022-05-29 RX ADMIN — Medication 2: at 23:42

## 2022-05-29 RX ADMIN — NYSTATIN CREAM 1 APPLICATION(S): 100000 CREAM TOPICAL at 05:15

## 2022-05-29 RX ADMIN — Medication 1 MILLIGRAM(S): at 23:58

## 2022-05-29 RX ADMIN — Medication 1 TABLET(S): at 11:24

## 2022-05-29 RX ADMIN — HEPARIN SODIUM 5000 UNIT(S): 5000 INJECTION INTRAVENOUS; SUBCUTANEOUS at 05:19

## 2022-05-29 RX ADMIN — Medication 1 APPLICATION(S): at 11:39

## 2022-05-29 RX ADMIN — Medication 1 APPLICATION(S): at 11:38

## 2022-05-29 RX ADMIN — NYSTATIN CREAM 1 APPLICATION(S): 100000 CREAM TOPICAL at 21:12

## 2022-05-29 RX ADMIN — Medication 1 MILLIGRAM(S): at 19:20

## 2022-05-29 RX ADMIN — Medication 1 MILLIGRAM(S): at 05:20

## 2022-05-29 RX ADMIN — Medication 1 MILLIGRAM(S): at 05:06

## 2022-05-29 RX ADMIN — HEPARIN SODIUM 5000 UNIT(S): 5000 INJECTION INTRAVENOUS; SUBCUTANEOUS at 13:09

## 2022-05-29 RX ADMIN — Medication 1 APPLICATION(S): at 11:26

## 2022-05-29 RX ADMIN — Medication 2: at 11:34

## 2022-05-29 RX ADMIN — CHLORHEXIDINE GLUCONATE 15 MILLILITER(S): 213 SOLUTION TOPICAL at 05:15

## 2022-05-29 RX ADMIN — Medication 1 MILLIGRAM(S): at 11:23

## 2022-05-29 RX ADMIN — CHLORHEXIDINE GLUCONATE 15 MILLILITER(S): 213 SOLUTION TOPICAL at 17:18

## 2022-05-29 RX ADMIN — NYSTATIN CREAM 1 APPLICATION(S): 100000 CREAM TOPICAL at 13:09

## 2022-05-29 RX ADMIN — Medication 1 MILLIGRAM(S): at 09:19

## 2022-05-29 NOTE — DISCHARGE NOTE PROVIDER - HOSPITAL COURSE
FROM ADMISSION H+P:   HPI:  This is an 80 y/o F with PMH of HTN, DM type 2, Cardiac Arrest, CVA, COPD, Chronic Respiratory Failure Vent Dependant s/p trach & PEG, Multiple Hospital Admissions for Aspiration & vent associated PNA, COVID-19 Infection, Anemia with GI blood loss, GERD, and Advanced Dementia who was sent from [facility] facility for acute respiratory distress with unknown onset, no reported fever or chills. On arrival to ED she was found with tachypnea & dropping of her SPO2 on same vent settings, CT chest without contrast revealed worse B/L PNA & B/L pleural effusion compared with her CT one month ago. Patient was discharged from hospital 3 weeks ago after admission for a similar condition, No history could obtained given her status. (21 May 2022 23:41)      ---  HOSPITAL COURSE:   Acute on Chronic Respiratory Failure / Septic Shock   Aspiration vs. VAP vs ; History of CRE and Psuedomonas; Remains Afebrile;   S/P IR Thoracentesis (L) 1500cc; MRSA negative  Completed course of abx inpatient   ID and Pulmonary consults appreciated     Anemia of Chronic Disease with Iron Deficiency  Has been evaluated by GI and Hematology on prior admissions  She has Anemia of Chronic Disease but could also have intermittent GI Bleeding; Occult Blood Negative  S/P 1U PRBC Transfusion and IV Venofer  Multivitamin daily    Acute Renal Failure on CKD 3  Baseline Cr around 1.2  Improved - Most likely due to Sepsis     HTN  Continue home meds on discharge   ---  TIME SPENT:  I, the attending physician, was physically present for the key portions of the evaluation and management (E/M) service provided. The total amount of time spent reviewing the hospital notes, laboratory values, imaging findings, assessing/counseling the patient, discussing with consultant physicians, social work, nursing staff was 53 minutes 79F HTN, DM2, COPD, Chronic Respiratory Failure on Trach to Vent admitted for Acute on Chronic Respiratory Failure.     Acute on Chronic Respiratory Failure / Septic Shock   Aspiration vs. VAP vs ; History of CRE and Psuedomonas; Remains Afebrile;   S/P IR Thoracentesis (L) 1500cc and all cultures are negative; Completed course of abx   Monitor fluid status   TTE- Normal LV, dilated RV, mod pulm htn, small pericardial effusion  ID and Pulmonary Input appreciated    Anemia of Chronic Disease with Iron Deficiency  Has been evaluated by GI and Hematology on prior admissions  She has Anemia of Chronic Disease but could also have intermittent GI Bleeding; Occult Blood Negative  S/P 2U PRBC Transfusion and IV Venofer  Multivitamin daily    Acute Renal Failure on CKD 3  Baseline Cr around 1.2  Renally dose and monitor BMP and electrolytes     HTN  Hold all BP lower agents    DM2  FS and on ISS to maintain BS <180    Diet  Tube Feeds    Disposition  Full Code   Patient medically optimized for discharge home if cleared by PT and Ortho.

## 2022-05-29 NOTE — PROGRESS NOTE ADULT - SUBJECTIVE AND OBJECTIVE BOX
Chief Complaint: Respiratory distress    Interval Events: No events overnight.    Review of Systems:  Unable to obtain    Physical Exam:  Vital Signs Last 24 Hrs  T(C): 36.3 (29 May 2022 08:49), Max: 36.9 (28 May 2022 23:40)  T(F): 97.3 (29 May 2022 08:49), Max: 98.4 (28 May 2022 23:40)  HR: 71 (29 May 2022 07:01) (71 - 103)  BP: 124/57 (29 May 2022 07:01) (106/55 - 131/58)  BP(mean): 77 (29 May 2022 07:01) (71 - 103)  RR: 26 (29 May 2022 07:01) (23 - 27)  SpO2: 100% (29 May 2022 07:01) (89% - 100%)  General: NAD  HEENT: Trach  Neck: No JVD, no carotid bruit  Lungs: Coarse bilaterally  CV: RRR, nl S1/S2, no M/R/G  Abdomen: S/NT/ND, +BS  Extremities: No LE edema, no cyanosis  Neuro: AAOx0  Skin: No rash    Labs:    05-29    143  |  109<H>  |  28<H>  ----------------------------<  150<H>  4.0   |  24  |  1.13    Ca    8.3<L>      29 May 2022 06:43  Phos  2.9     05-29  Mg     1.6     05-29    TPro  6.7  /  Alb  1.8<L>  /  TBili  0.4  /  DBili  x   /  AST  12  /  ALT  27  /  AlkPhos  150<H>  05-29                        8.4    11.87 )-----------( 227      ( 29 May 2022 06:43 )             29.5       Telemetry: Sinus rhythm

## 2022-05-29 NOTE — PROGRESS NOTE ADULT - ASSESSMENT
This is an 80 y/o F with PMH of HTN, DM type 2, Cardiac Arrest, CVA, COPD, Chronic Respiratory Failure Vent Dependant s/p trach & PEG, Multiple Hospital Admissions for Aspiration & vent associated PNA, COVID-19 Infection, Anemia with GI blood loss, GERD, and Advanced Dementia who was sent from [facility] facility for acute respiratory distress.     Sepsis 2/2 VAP/PNA c/b pleural effusions  Pleural Effusions: parapneumonic vs. empyema    - prior sputum 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  - s/p IR guided thoracentesis on 5/25    --pleural fluid cx NGTD  - repeat 5/25 CT chest w/ improved aeration    s/p avycaz  x7 day course w/ last day 5/28  S/p linezolid  C/w vent management per ICU    -obs off abx  DC planning    Thank you for consulting us and involving us in the management of this most interesting and challenging case.  We will follow along in the care of this patient. Please call us at [phone] or text me directly on my cell# at [phone] with any concerns.

## 2022-05-29 NOTE — PROGRESS NOTE ADULT - ASSESSMENT
The patient is a 76 year old female with a history of HTN, DM, CVA, dementia, chronic respiratory failure s/p trach, PEG, cardiac arrest, anemia who presents with respiratory distress.    Plan:  - ECG with no evidence of ischemia or infarction  - Troponin elevated at 257 in the setting of respiratory failure and septic shock. No need to trend further.  - Echo 9/21 with normal LV systolic function, mod pulm HTN  - Completed ceftazidime  - Pulm and ID follow-up

## 2022-05-29 NOTE — DISCHARGE NOTE PROVIDER - CARE PROVIDER_API CALL
Paul Merrill  INTERNAL MEDICINE  [address] [place] [address], [address]  [place] [address]  Phone: [phone]  Fax: [phone]  Follow Up Time: 1-3 days

## 2022-05-29 NOTE — PROGRESS NOTE ADULT - ASSESSMENT
REVIEW OF SYMPTOMS      Able to give (reliable) ROS  NO     PHYSICAL EXAM    HEENT Unremarkable  atraumatic   RESP Fair air entry EXP prolonged    Harsh breath sound Resp distres mild   CARDIAC S1 S2 No S3     NO JVD    ABDOMEN SOFT BS PRESENT NOT DISTENDED No hepatosplenomegaly   PEDAL EDEMA present No calf tenderness  NO rash       AGE/SEX.   76 f    DOA.  5/21/2022     CC .  5/21/2022 AOC RESP FAILURE     PMH .  pmh Hosp stay given zosyn 4/21  pmh Pneumonia    pmh HTN,   pmh DM,   pmh CVA,   pmh  chronic respiratory failure   pmh s/p trach, PEG,   pmh cardiac arrest        MAIN ISSUES  .  TRACH PERF REPORTED BY RADIO 5/25 RULED OUT ON CT    SEVERE HYPOXIC RESP  FAILURE poa   VAP poa   SHOCK poa   BL PL EFFSNS   5/25/2022 Thoracentesis  CHF  PULM HYTN   ANEMIA 5/23/2022 Hb 6.8   ELEVATED LFTS       COVID/ICU/CODE STATUS.                       COVID  STATUS. 5/21/2022 scv2 (-)           ICU STAY. 5/21  GOC.  5/24/2022 full code    BEST PRACTICE ISSUES.                                                  HEAD OF BED ELEVATION. Yes  DVT PROPHYLAXIS.     5/21/2022 hpsc    SQUIRES PROPHYLAXIS.5/22/2022 protonix 40   INFECTION PROPHYLAXIS.   5/21/2022 Ch;lorhexidine .12%   5/21/2022 Chlorhexidine 2%                                                                                         DIET.  5/22 glucerna 1.5 1200 gt      VITALS/PO/IO/VENT/DRIPS.   5/29/2022 79 110/40   5/29/2022 ac 26/400/5/.35      PROBLEM DATA/ASSESSMENT RECOMMENDATIONS (A/R).    HEMODYNAMICS.   Monitor and optimize bp   Target MAP to miguel  65 (+)    RESP.   Monitor and optimize  po   Target po to remain 90-95%    ABG.  5/23/2022 5a ac 26/400/10/70%  755/34/81   5/22/2022 5a vent 90% 744/41/117   5/21/2022 8p 100% vent 739/50/48    PMH/PSH PROBLEMS.  Management continued/modified as indicated      VENT MANAGEMENT.   HOB elevation  Target Pplat 30 (-)  Target PO 90-95%  Target pH 730 (+)  Daily spontaneous breathing trials   Daily sedation vacation         DIARRHEA.  5/23/2022 c diff (-)     PICC poa   5/23/2022 Ordered to dc picc    INFECTION SOURCE DD.   INFECTION A/R.   Has ho crp   Has ho iv abio within last 90 d  Is on bsab   id eval Dr BRITTANY Brantley appreciated   Started on ceftazidime avibactam 1.25x2 5/22/2022        MICROBIO.  Rvp 5/21/2022 (-)   urine c 5/21 (-)   blod c 5/21 (-)   sputum 5/23 numerous pseudomonas   c diff 5/23/2022 c diff (-)   mrsa 5/24 (-)   legn 5/26 (-)   PAST MICROBIO.  4/24/2022 sputum CRABAPENEM RESISTANT PSEUDOMONAS  ABIO.  5/22/2022 ceftazidime avibactam 1.25x2     SEVERE HYPOXIC RESP FAILURE poa.   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  V duplx 5/23/2022 (-)     COPD.  5/22/2022 duoneb     PLEURAL EFFSNS.  echo 9/8/2021   n lvsf  mild dd  n rvsf  rvsp 51   ct 4/22/2022 ct ch 4/21/2022   bl effsns increased in size cw 1/2022 5/25/2022 1.5 l clear pl effsn removed left side IR  5/25/2022 g 183 l 144/381 .37 p 3.4/5.3 .64 p 23 l 36   5/25/2022l pl fluid  lymph pred exudate   cyto (-)         ANEMIA.   Hb 5/21-5/22-5/23-5/24-5/25-5/26-5/27-5/28-5/29/2022   Hb 8 - 7.2 - 6.8 - 8 - 7.8-7.5 - 8.4 - 8.3 - 8.4   monitor  target hb 7 (+)     TRANSFUSION.  5/23/2022 1 u prbc    RYAN.  Na 5/21/2022 Na 140  CO2 5/21/2022 CO2 30   Cr 5/21-5/22-5/23-5/24-5/25-5/26-5/27-5/28/2022   Cr 1.6 - 1.5- 1.4 - 1.4-1.4 - 1.2 - 1.3 - 1.2    monitor     ELEVATED LFTS.  LFTS 5/22-5/23-5/24-5/26/2022  - 136-124-120   - 104-47-23   - 149 - 105-65   5/22/2022 us abd fibrofatty liver dis borderline gbw thick tr perichole fluid   5/22/2022  hep panel (-)       TIME SPENT   Over 36  minutes aggregate critical care time spent on encounter; activities included   direct patient care, counseling and/or coordinating care reviewing notes, lab data/ imaging , discussion with multidisciplinary team/ patient  /family and explaining in detail risks, benefits, alternatives  of the recommendations     CHAPINCITO GUIDRY 76 f [facility] S 5/21/2022

## 2022-05-29 NOTE — DISCHARGE NOTE PROVIDER - NSDCCPCAREPLAN_GEN_ALL_CORE_FT
PRINCIPAL DISCHARGE DIAGNOSIS  Diagnosis: Pneumonia  Assessment and Plan of Treatment: Seen by pulmonary and ID and treated with IV antibiotics. Thoracentesis was done with 1500cc fluid drainage. Please follow up with PCP or pulmonary outpatient for further management      SECONDARY DISCHARGE DIAGNOSES  Diagnosis: RYAN (acute kidney injury)  Assessment and Plan of Treatment: Improved with hydration, recommend monitoring BMP periodically and continued hydration as needed    Diagnosis: Anemia of chronic disease  Assessment and Plan of Treatment: Evaluated by GI and hematology on previous admissions and treated with IV Venofer this admission. Recommend monitoring HGB with periodic labs and consider IV venofer as clinically indicated. Take multivitamin and folic acid daily

## 2022-05-29 NOTE — DISCHARGE NOTE PROVIDER - NSDCFUADDINST_GEN_ALL_CORE_FT
- Please make an appointment for follow up with your primary doctor within 1-3 days of discharge  - It is important to see your primary physician as well as the physicians listed below to perform a comprehensive medical review.  Call their offices for an appointment as soon as you leave the hospital.  You will also need to see them for renewal of your medications.  If you do not have a primary physician, contact the [facility] Physician Referral Service at [phone].  To obtain your results, you can access the [facility] Patient Portal at [email or web address].   - Your medical issues appear to be stable at this time, but if your symptoms recur or worsen, contact your physicians and/or return to the hospital if necessary.    -If you encounter any issues or questions with your medication, call your physicians before stopping the medication.    - Patient or caretaker is responsible for making all appointments

## 2022-05-29 NOTE — DISCHARGE NOTE PROVIDER - NSDCMRMEDTOKEN_GEN_ALL_CORE_FT
albuterol 90 mcg/inh inhalation aerosol with adapter: 2  inhaled every 6 hours  Bacid (LAC) oral tablet: 2 tab(s) by gastrostomy tube once a day  Betadine 10% topical swab: cleanse right and left great toes  Carafate 1 g/10 mL oral suspension: 10 milliliter(s) by gastrostomy tube 4 times a day (before meals and at bedtime) for 14 days (Started 6/4/21)  chlorhexidine 0.12% mucous membrane liquid: 15 milliliter(s) mucous membrane 2 times a day  Eucerin topical cream: Apply topically to affected area once a day bilateral feet  folic acid 1 mg oral tablet: 1 tab(s) orally once a day  insulin glargine 100 units/mL subcutaneous solution: 5 unit(s) subcutaneous once a day (at bedtime)  ipratropium-albuterol 0.5 mg-2.5 mg/3 mL inhalation solution: 3 milliliter(s) inhaled 4 times a day  LORazepam 1 mg oral tablet: 1 tab(s) by gastrostomy tube every 4 hours  methocarbamol 500 mg oral tablet: 1 tab(s) by gastrostomy tube 2 times a day  MiraLax oral powder for reconstitution:   Multiple Vitamins oral tablet: 1 tab(s) orally once a day  nystatin 100,000 units/g topical powder: 1 application topically 3 times a day  omeprazole 20 mg oral delayed release capsule: orally 2 times a day  polyethylene glycol 3350 oral powder for reconstitution: 17 gram(s) by gastrostomy tube every 12 hours  senna 8.6 mg oral tablet: 3 tab(s) by gastrostomy tube once a day (at bedtime)  simethicone 80 mg oral tablet, chewable: 1 tab(s) by gastrostomy tube every 6 hours  Tylenol 325 mg oral tablet: 2 tab(s) by gastrostomy tube once a day; 60 minutes prior to dressing change   Tylenol 325 mg oral tablet: 2 tab(s) by gastrostomy tube every 6 hours, As Needed   albuterol 90 mcg/inh inhalation aerosol with adapter: 2  inhaled every 6 hours  Bacid (LAC) oral tablet: 2 tab(s) by gastrostomy tube once a day  Betadine 10% topical swab: cleanse right and left great toes  Carafate 1 g/10 mL oral suspension: 10 milliliter(s) by gastrostomy tube 4 times a day (before meals and at bedtime) for 14 days (Started 6/4/21)  chlorhexidine 0.12% mucous membrane liquid: 15 milliliter(s) mucous membrane 2 times a day  Eucerin topical cream: Apply topically to affected area once a day bilateral feet  folic acid 1 mg oral tablet: 1 tab(s) orally once a day  insulin glargine 100 units/mL subcutaneous solution: 8 unit(s) subcutaneous once a day (in the morning)  ipratropium-albuterol 0.5 mg-2.5 mg/3 mL inhalation solution: 3 milliliter(s) inhaled 4 times a day  LORazepam 1 mg oral tablet: 1 tab(s) by gastrostomy tube every 4 hours  methocarbamol 500 mg oral tablet: 1 tab(s) by gastrostomy tube 2 times a day  MiraLax oral powder for reconstitution:   Multiple Vitamins oral tablet: 1 tab(s) orally once a day  nystatin 100,000 units/g topical powder: 1 application topically 3 times a day  omeprazole 20 mg oral delayed release capsule: orally 2 times a day  polyethylene glycol 3350 oral powder for reconstitution: 17 gram(s) by gastrostomy tube every 12 hours  senna 8.6 mg oral tablet: 3 tab(s) by gastrostomy tube once a day (at bedtime)  simethicone 80 mg oral tablet, chewable: 1 tab(s) by gastrostomy tube every 6 hours  Tylenol 325 mg oral tablet: 2 tab(s) by gastrostomy tube once a day; 60 minutes prior to dressing change   Tylenol 325 mg oral tablet: 2 tab(s) by gastrostomy tube every 6 hours, As Needed

## 2022-05-29 NOTE — PROGRESS NOTE ADULT - SUBJECTIVE AND OBJECTIVE BOX
Date/Time Patient Seen:  		  Referring MD:   Data Reviewed	       Patient is a 76y old  Female who presents with a chief complaint of Acute respiratory distress. (28 May 2022 21:24)      Subjective/HPI     PAST MEDICAL & SURGICAL HISTORY:  Dementia of frontal lobe type    Aphasic stroke    Diabetes mellitus    Respiratory failure    Hypertension    GERD (gastroesophageal reflux disease)    Constipation    Respiratory failure    CVA (cerebral vascular accident)    HTN (hypertension)    DM (diabetes mellitus)    Advanced dementia    COVID-19 virus detected    Quadriplegia    Pneumonia    Type II diabetes mellitus    Hx of appendectomy    Gastrostomy in place    Tracheostomy in place    Tracheostomy tube present    Feeding by G-tube          Medication list         MEDICATIONS  (STANDING):  chlorhexidine 0.12% Liquid 15 milliLiter(s) Oral Mucosa every 12 hours  chlorhexidine 2% Cloths 1 Application(s) Topical <User Schedule>  chlorhexidine 4% Liquid 1 Application(s) Topical <User Schedule>  collagenase Ointment 1 Application(s) Topical daily  dextrose 5%. 1000 milliLiter(s) (50 mL/Hr) IV Continuous <Continuous>  dextrose 5%. 1000 milliLiter(s) (100 mL/Hr) IV Continuous <Continuous>  dextrose 50% Injectable 25 Gram(s) IV Push once  dextrose 50% Injectable 12.5 Gram(s) IV Push once  dextrose 50% Injectable 25 Gram(s) IV Push once  folic acid 1 milliGRAM(s) Oral daily  glucagon  Injectable 1 milliGRAM(s) IntraMuscular once  heparin   Injectable 5000 Unit(s) SubCutaneous every 8 hours  insulin glargine Injectable (LANTUS) 5 Unit(s) SubCutaneous every morning  insulin lispro (ADMELOG) corrective regimen sliding scale   SubCutaneous every 6 hours  lactobacillus acidophilus 1 Tablet(s) Oral daily  LORazepam     Tablet 1 milliGRAM(s) Oral every 4 hours  mineral oil/petrolatum Hydrophilic Ointment 1 Application(s) Topical daily  multivitamin 1 Tablet(s) Oral daily  nystatin Powder 1 Application(s) Topical three times a day  pantoprazole  Injectable 40 milliGRAM(s) IV Push daily  povidone iodine 10% Solution 1 Application(s) Topical daily    MEDICATIONS  (PRN):  acetaminophen    Suspension .. 650 milliGRAM(s) Oral every 6 hours PRN Temp greater or equal to 38C (100.4F), Mild Pain (1 - 3)  albuterol/ipratropium for Nebulization 3 milliLiter(s) Nebulizer every 6 hours PRN Shortness of Breath and/or Wheezing  dextrose Oral Gel 15 Gram(s) Oral once PRN Blood Glucose LESS THAN 70 milliGRAM(s)/deciliter  LORazepam   Injectable 1 milliGRAM(s) IV Push every 6 hours PRN agitation/vent dyssynchrony  sodium chloride 0.9% lock flush 10 milliLiter(s) IV Push every 1 hour PRN Pre/post blood products, medications, blood draw, and to maintain line patency         Vitals log        ICU Vital Signs Last 24 Hrs  T(C): 36.9 (29 May 2022 05:32), Max: 37.8 (28 May 2022 08:14)  T(F): 98.4 (29 May 2022 05:32), Max: 100 (28 May 2022 08:14)  HR: 79 (29 May 2022 06:00) (76 - 103)  BP: 106/55 (29 May 2022 06:00) (106/55 - 131/58)  BP(mean): 71 (29 May 2022 06:00) (67 - 103)  ABP: --  ABP(mean): --  RR: 26 (29 May 2022 06:00) (23 - 27)  SpO2: 96% (29 May 2022 06:00) (89% - 100%)       Mode: AC/ CMV (Assist Control/ Continuous Mandatory Ventilation)  RR (machine): 26  TV (machine): 400  FiO2: 35  PEEP: 5  ITime: 1  MAP: 17  PIP: 36      Input and Output:  I&O's Detail    27 May 2022 07:01  -  28 May 2022 07:00  --------------------------------------------------------  IN:    Glucerna 1.5: 1300 mL    IV PiggyBack: 200 mL  Total IN: 1500 mL    OUT:    Voided (mL): 1000 mL  Total OUT: 1000 mL    Total NET: 500 mL      28 May 2022 07:01  -  29 May 2022 06:16  --------------------------------------------------------  IN:    Free Water: 350 mL    Glucerna 1.5: 1380 mL  Total IN: 1730 mL    OUT:    Voided (mL): 850 mL  Total OUT: 850 mL    Total NET: 880 mL          Lab Data                        8.3    9.45  )-----------( 246      ( 28 May 2022 07:05 )             28.0     05-28    142  |  108  |  26<H>  ----------------------------<  177<H>  4.1   |  25  |  1.26    Ca    8.3<L>      28 May 2022 07:05    TPro  6.2  /  Alb  1.7<L>  /  TBili  0.6  /  DBili  x   /  AST  16  /  ALT  34  /  AlkPhos  127<H>  05-28            Review of Systems	      Objective     Physical Examination    heart s1s2  lung dec BS  abd soft      Pertinent Lab findings & Imaging      Annalise:  NO   Adequate UO     I&O's Detail    27 May 2022 07:01  -  28 May 2022 07:00  --------------------------------------------------------  IN:    Glucerna 1.5: 1300 mL    IV PiggyBack: 200 mL  Total IN: 1500 mL    OUT:    Voided (mL): 1000 mL  Total OUT: 1000 mL    Total NET: 500 mL      28 May 2022 07:01  -  29 May 2022 06:16  --------------------------------------------------------  IN:    Free Water: 350 mL    Glucerna 1.5: 1380 mL  Total IN: 1730 mL    OUT:    Voided (mL): 850 mL  Total OUT: 850 mL    Total NET: 880 mL               Discussed with:     Cultures:	        Radiology

## 2022-05-29 NOTE — PROGRESS NOTE ADULT - SUBJECTIVE AND OBJECTIVE BOX
BREA BECKHAM     [facility] 04    Allergies    codeine (Hives)    Intolerances        PAST MEDICAL & SURGICAL HISTORY:  Dementia of frontal lobe type      Aphasic stroke      Diabetes mellitus      Respiratory failure      Hypertension      GERD (gastroesophageal reflux disease)      Constipation      Respiratory failure      CVA (cerebral vascular accident)      HTN (hypertension)      DM (diabetes mellitus)      Advanced dementia      COVID-19 virus detected      Quadriplegia      Pneumonia      Type II diabetes mellitus      Hx of appendectomy      Gastrostomy in place      Tracheostomy in place      Tracheostomy tube present      Feeding by G-tube          FAMILY HISTORY:  No pertinent family history in first degree relatives        Home Medications:  albuterol 90 mcg/inh inhalation aerosol with adapter: 2  inhaled every 6 hours (21 May 2022 21:16)  Bacid (LAC) oral tablet: 2 tab(s) by gastrostomy tube once a day (21 May 2022 21:16)  Basaglar KwikPen 100 units/mL subcutaneous solution: 36 unit(s) subcutaneous once a day (at bedtime) (21 May 2022 21:16)  Betadine 10% topical swab: cleanse right and left great toes (21 May 2022 21:16)  Carafate 1 g/10 mL oral suspension: 10 milliliter(s) by gastrostomy tube 4 times a day (before meals and at bedtime) for 14 days (Started 6/4/21) (21 May 2022 21:16)  chlorhexidine 0.12% mucous membrane liquid: 15 milliliter(s) mucous membrane 2 times a day (21 May 2022 21:16)  Eucerin topical cream: Apply topically to affected area once a day bilateral feet (21 May 2022 21:16)  folic acid 1 mg oral tablet: 1 tab(s) orally once a day (21 May 2022 21:16)  ipratropium-albuterol 0.5 mg-2.5 mg/3 mL inhalation solution: 3 milliliter(s) inhaled 4 times a day (21 May 2022 21:16)  LORazepam 1 mg oral tablet: 1 tab(s) by gastrostomy tube every 4 hours (21 May 2022 21:16)  methocarbamol 500 mg oral tablet: 1 tab(s) by gastrostomy tube 2 times a day (21 May 2022 21:16)  MiraLax oral powder for reconstitution:  (21 May 2022 23:14)  Multiple Vitamins oral tablet: 1 tab(s) orally once a day (21 May 2022 21:16)  omeprazole 20 mg oral delayed release capsule: orally 2 times a day (21 May 2022 23:14)  polyethylene glycol 3350 oral powder for reconstitution: 17 gram(s) by gastrostomy tube every 12 hours (21 May 2022 21:16)  senna 8.6 mg oral tablet: 3 tab(s) by gastrostomy tube once a day (at bedtime) (21 May 2022 21:16)  simethicone 80 mg oral tablet, chewable: 1 tab(s) by gastrostomy tube every 6 hours (21 May 2022 21:16)  Tylenol 325 mg oral tablet: 2 tab(s) by gastrostomy tube once a day; 60 minutes prior to dressing change  (21 May 2022 21:16)  Tylenol 325 mg oral tablet: 2 tab(s) by gastrostomy tube every 6 hours, As Needed (21 May 2022 21:16)  Zosyn:  (21 May 2022 23:14)      MEDICATIONS  (STANDING):  chlorhexidine 0.12% Liquid 15 milliLiter(s) Oral Mucosa every 12 hours  chlorhexidine 2% Cloths 1 Application(s) Topical <User Schedule>  chlorhexidine 4% Liquid 1 Application(s) Topical <User Schedule>  collagenase Ointment 1 Application(s) Topical daily  dextrose 5%. 1000 milliLiter(s) (50 mL/Hr) IV Continuous <Continuous>  dextrose 5%. 1000 milliLiter(s) (100 mL/Hr) IV Continuous <Continuous>  dextrose 50% Injectable 25 Gram(s) IV Push once  dextrose 50% Injectable 12.5 Gram(s) IV Push once  dextrose 50% Injectable 25 Gram(s) IV Push once  folic acid 1 milliGRAM(s) Oral daily  glucagon  Injectable 1 milliGRAM(s) IntraMuscular once  heparin   Injectable 5000 Unit(s) SubCutaneous every 8 hours  insulin glargine Injectable (LANTUS) 5 Unit(s) SubCutaneous every morning  insulin lispro (ADMELOG) corrective regimen sliding scale   SubCutaneous every 6 hours  lactobacillus acidophilus 1 Tablet(s) Oral daily  LORazepam     Tablet 1 milliGRAM(s) Oral every 4 hours  mineral oil/petrolatum Hydrophilic Ointment 1 Application(s) Topical daily  multivitamin 1 Tablet(s) Oral daily  nystatin Powder 1 Application(s) Topical three times a day  pantoprazole  Injectable 40 milliGRAM(s) IV Push daily  povidone iodine 10% Solution 1 Application(s) Topical daily    MEDICATIONS  (PRN):  acetaminophen    Suspension .. 650 milliGRAM(s) Oral every 6 hours PRN Temp greater or equal to 38C (100.4F), Mild Pain (1 - 3)  albuterol/ipratropium for Nebulization 3 milliLiter(s) Nebulizer every 6 hours PRN Shortness of Breath and/or Wheezing  dextrose Oral Gel 15 Gram(s) Oral once PRN Blood Glucose LESS THAN 70 milliGRAM(s)/deciliter  LORazepam   Injectable 1 milliGRAM(s) IV Push every 6 hours PRN agitation/vent dyssynchrony  sodium chloride 0.9% lock flush 10 milliLiter(s) IV Push every 1 hour PRN Pre/post blood products, medications, blood draw, and to maintain line patency      Diet, NPO with Tube Feed:   Tube Feeding Modality: Gastrostomy  Glucerna 1.5 Horacio  Total Volume for 24 Hours (mL): 1200  Continuous  Starting Tube Feed Rate mL per Hour: 20  Increase Tube Feed Rate by (mL): 10     Every 6 hours  Until Goal Tube Feed Rate (mL per Hour): 60  Tube Feed Duration (in Hours): 20  Tube Feed Start Time: 00:00 (05-22-22 @ 11:42) [Active]          Vital Signs Last 24 Hrs  T(C): 36.3 (29 May 2022 08:49), Max: 36.9 (28 May 2022 23:40)  T(F): 97.3 (29 May 2022 08:49), Max: 98.4 (28 May 2022 23:40)  HR: 74 (29 May 2022 11:31) (71 - 105)  BP: 133/61 (29 May 2022 11:00) (94/49 - 133/61)  BP(mean): 83 (29 May 2022 11:00) (64 - 103)  RR: 30 (29 May 2022 11:00) (23 - 30)  SpO2: 99% (29 May 2022 11:31) (89% - 100%)      05-28-22 @ 07:01  -  05-29-22 @ 07:00  --------------------------------------------------------  IN: 1790 mL / OUT: 850 mL / NET: 940 mL    05-29-22 @ 07:01  -  05-29-22 @ 11:44  --------------------------------------------------------  IN: 300 mL / OUT: 0 mL / NET: 300 mL        Mode: AC/ CMV (Assist Control/ Continuous Mandatory Ventilation), RR (machine): 26, TV (machine): 400, FiO2: 35, PEEP: 5, ITime: 0.9, MAP: 17, PIP: 34      LABS:                        8.4    11.87 )-----------( 227      ( 29 May 2022 06:43 )             29.5     05-29    143  |  109<H>  |  28<H>  ----------------------------<  150<H>  4.0   |  24  |  1.13    Ca    8.3<L>      29 May 2022 06:43  Phos  2.9     05-29  Mg     1.6     05-29    TPro  6.7  /  Alb  1.8<L>  /  TBili  0.4  /  DBili  x   /  AST  12  /  ALT  27  /  AlkPhos  150<H>  05-29    PT/INR - ( 29 May 2022 06:43 )   PT: 14.2 sec;   INR: 1.23 ratio                   WBC:  WBC Count: 11.87 K/uL (05-29 @ 06:43)  WBC Count: 9.45 K/uL (05-28 @ 07:05)  WBC Count: 11.79 K/uL (05-27 @ 06:00)  WBC Count: 11.28 K/uL (05-26 @ 06:04)      MICROBIOLOGY:  RECENT CULTURES:  05-25 .Body Fluid Pleural Fluid XXXX   polymorphonuclear leukocytes seen  No organisms seen  by cytocentrifuge   Culture is being performed.    05-23 Trach Asp Tracheal Aspirate Pseudomonas aeruginosa (Carbapenem Resistant)   Moderate polymorphonuclear leukocytes per low power field  No Squamous epithelial cells per low power field  Moderate Gram Negative Rods per oil power field   Numerous Mixed gram negative rods including  Numerous Pseudomonas aeruginosa (Carbapenem Resistant)  Normal Respiratory Elvira absent                PT/INR - ( 29 May 2022 06:43 )   PT: 14.2 sec;   INR: 1.23 ratio             Sodium:  Sodium, Serum: 143 mmol/L (05-29 @ 06:43)  Sodium, Serum: 142 mmol/L (05-28 @ 07:05)  Sodium, Serum: 139 mmol/L (05-27 @ 06:00)  Sodium, Serum: 142 mmol/L (05-26 @ 06:04)      1.13 mg/dL 05-29 @ 06:43  1.26 mg/dL 05-28 @ 07:05  1.32 mg/dL 05-27 @ 06:00  1.28 mg/dL 05-26 @ 06:04      Hemoglobin:  Hemoglobin: 8.4 g/dL (05-29 @ 06:43)  Hemoglobin: 8.3 g/dL (05-28 @ 07:05)  Hemoglobin: 8.4 g/dL (05-27 @ 06:00)  Hemoglobin: 7.5 g/dL (05-26 @ 06:04)      Platelets: Platelet Count - Automated: 227 K/uL (05-29 @ 06:43)  Platelet Count - Automated: 246 K/uL (05-28 @ 07:05)  Platelet Count - Automated: 242 K/uL (05-27 @ 06:00)  Platelet Count - Automated: 243 K/uL (05-26 @ 06:04)      LIVER FUNCTIONS - ( 29 May 2022 06:43 )  Alb: 1.8 g/dL / Pro: 6.7 g/dL / ALK PHOS: 150 U/L / ALT: 27 U/L DA / AST: 12 U/L / GGT: x                 RADIOLOGY & ADDITIONAL STUDIES:      MICROBIOLOGY:  RECENT CULTURES:  05-25 .Body Fluid Pleural Fluid XXXX   polymorphonuclear leukocytes seen  No organisms seen  by cytocentrifuge   Culture is being performed.    05-23 Trach Asp Tracheal Aspirate Pseudomonas aeruginosa (Carbapenem Resistant)   Moderate polymorphonuclear leukocytes per low power field  No Squamous epithelial cells per low power field  Moderate Gram Negative Rods per oil power field   Numerous Mixed gram negative rods including  Numerous Pseudomonas aeruginosa (Carbapenem Resistant)  Normal Respiratory Elvira absent

## 2022-05-29 NOTE — PROGRESS NOTE ADULT - ASSESSMENT
79F HTN, DM2, COPD, Chronic Respiratory Failure on Trach to Vent admitted for Acute on Chronic Respiratory Failure.     Acute on Chronic Respiratory Failure / Septic Shock   Aspiration vs. VAP vs ; History of CRE and Psuedomonas; Remains Afebrile;   S/P IR Thoracentesis (L) 1500cc and will follow up cultures; MRSA negative  Continue IV Ceftazidime and ID to guide therapy  ID and Pulmonary consults appreciated     Anemia of Chronic Disease with Iron Deficiency  Has been evaluated by GI and Hematology on prior admissions  She has Anemia of Chronic Disease but could also have intermittent GI Bleeding; Occult Blood Negative  S/P 1U PRBC Transfusion and IV Venofer  Multivitamin daily    Acute Renal Failure on CKD 3  Baseline Cr around 1.2  Improving - Most likely due to Sepsis   Will continue gentle hydration and maintain BP   Renally dose and monitor BMP and electrolytes     HTN  Hold all BP lower agents    DM2  FS and on ISS to maintain BS <180    Diet  Tube Feeds    DVT Prophylaxis  Heparin    Disposition  Full Code   Discharge planning pending culture results and antibiotic regimen   79F HTN, DM2, COPD, Chronic Respiratory Failure on Trach to Vent admitted for Acute on Chronic Respiratory Failure.     Acute on Chronic Respiratory Failure / Septic Shock   Aspiration vs. VAP vs ; History of CRE and Psuedomonas; Remains Afebrile;   S/P IR Thoracentesis (L) 1500cc and will follow up cultures; MRSA negative  Completed course of abx   ID and Pulmonary consults appreciated     Anemia of Chronic Disease with Iron Deficiency  Has been evaluated by GI and Hematology on prior admissions  She has Anemia of Chronic Disease but could also have intermittent GI Bleeding; Occult Blood Negative  S/P 1U PRBC Transfusion and IV Venofer  Multivitamin daily    Acute Renal Failure on CKD 3  Baseline Cr around 1.2  Improving - Most likely due to Sepsis   Will continue gentle hydration and maintain BP   Renally dose and monitor BMP and electrolytes     HTN  Hold all BP lower agents    DM2  FS and on ISS to maintain BS <180    Diet  Tube Feeds    DVT Prophylaxis  Heparin    Disposition  Full Code   Discharge planning pending culture results and antibiotic regimen

## 2022-05-29 NOTE — PROGRESS NOTE ADULT - SUBJECTIVE AND OBJECTIVE BOX
[facility] DIVISION of INFECTIOUS DISEASE  Arturo Olivas MD PhD, Haylee Umanzor MD, Andressa Brantley MD, Billy Valenzuela MD, Emil Medellin MD  and providing coverage with Eusebio Chairez MD  Providing Infectious Disease Consultations at [facility], [facility], [facility]'s    Office# [phone] to schedule follow up appointments  Answering Service for urgent calls or New Consults [phone]  Cell# to text for urgent issues Arturo Olivas [phone]     infectious diseases progress note:    BREA BECKHAM is a 76y y. o. Female patient    No concerning overnight events    Allergies    codeine (Hives)    Intolerances        ANTIBIOTICS/RELEVANT:  antimicrobials    immunologic:    OTHER:  acetaminophen    Suspension .. 650 milliGRAM(s) Oral every 6 hours PRN  albuterol/ipratropium for Nebulization 3 milliLiter(s) Nebulizer every 6 hours PRN  chlorhexidine 0.12% Liquid 15 milliLiter(s) Oral Mucosa every 12 hours  chlorhexidine 2% Cloths 1 Application(s) Topical <User Schedule>  chlorhexidine 4% Liquid 1 Application(s) Topical <User Schedule>  collagenase Ointment 1 Application(s) Topical daily  dextrose 5%. 1000 milliLiter(s) IV Continuous <Continuous>  dextrose 5%. 1000 milliLiter(s) IV Continuous <Continuous>  dextrose 50% Injectable 25 Gram(s) IV Push once  dextrose 50% Injectable 12.5 Gram(s) IV Push once  dextrose 50% Injectable 25 Gram(s) IV Push once  dextrose Oral Gel 15 Gram(s) Oral once PRN  folic acid 1 milliGRAM(s) Oral daily  glucagon  Injectable 1 milliGRAM(s) IntraMuscular once  heparin   Injectable 5000 Unit(s) SubCutaneous every 8 hours  insulin glargine Injectable (LANTUS) 5 Unit(s) SubCutaneous every morning  insulin lispro (ADMELOG) corrective regimen sliding scale   SubCutaneous every 6 hours  lactobacillus acidophilus 1 Tablet(s) Oral daily  LORazepam     Tablet 1 milliGRAM(s) Oral every 4 hours  LORazepam   Injectable 1 milliGRAM(s) IV Push every 6 hours PRN  mineral oil/petrolatum Hydrophilic Ointment 1 Application(s) Topical daily  multivitamin 1 Tablet(s) Oral daily  nystatin Powder 1 Application(s) Topical three times a day  pantoprazole  Injectable 40 milliGRAM(s) IV Push daily  povidone iodine 10% Solution 1 Application(s) Topical daily  sodium chloride 0.9% lock flush 10 milliLiter(s) IV Push every 1 hour PRN      Objective:  Vital Signs Last 24 Hrs  T(C): 37.7 (29 May 2022 12:10), Max: 37.7 (29 May 2022 12:10)  T(F): 99.9 (29 May 2022 12:10), Max: 99.9 (29 May 2022 12:10)  HR: 77 (29 May 2022 14:19) (71 - 105)  BP: 118/50 (29 May 2022 14:00) (94/49 - 133/61)  BP(mean): 71 (29 May 2022 14:00) (61 - 103)  RR: 26 (29 May 2022 14:00) (23 - 30)  SpO2: 100% (29 May 2022 14:19) (89% - 100%)    T(C): 37.7 (05-29-22 @ 12:10), Max: 37.8 (05-28-22 @ 08:14)  T(C): 37.7 (05-29-22 @ 12:10), Max: 37.8 (05-28-22 @ 08:14)  T(C): 37.7 (05-29-22 @ 12:10), Max: 37.8 (05-28-22 @ 08:14)    PHYSICAL EXAM:  HEENT: NC atraumatic  Neck: supple, intubated via trach  Respiratory: no accessory muscle use, breathing comfortably  Cardiovascular: distant  Gastrointestinal: normal appearing, nondistended  Extremities: no clubbing, no cyanosis,    Mode: AC/ CMV (Assist Control/ Continuous Mandatory Ventilation), RR (machine): 26, TV (machine): 400, FiO2: 35, PEEP: 5, ITime: 1, MAP: 15, PIP: 31    LABS:                          8.4    11.87 )-----------( 227      ( 29 May 2022 06:43 )             29.5       WBC  11.87 05-29 @ 06:43  9.45 05-28 @ 07:05  11.79 05-27 @ 06:00  11.28 05-26 @ 06:04  11.83 05-25 @ 06:00  14.23 05-24 @ 06:56  13.05 05-23 @ 06:44      05-29    143  |  109<H>  |  28<H>  ----------------------------<  150<H>  4.0   |  24  |  1.13    Ca    8.3<L>      29 May 2022 06:43  Phos  2.9     05-29  Mg     1.6     05-29    TPro  6.7  /  Alb  1.8<L>  /  TBili  0.4  /  DBili  x   /  AST  12  /  ALT  27  /  AlkPhos  150<H>  05-29      Creatinine, Serum: 1.13 mg/dL (05-29-22 @ 06:43)  Creatinine, Serum: 1.26 mg/dL (05-28-22 @ 07:05)  Creatinine, Serum: 1.32 mg/dL (05-27-22 @ 06:00)  Creatinine, Serum: 1.28 mg/dL (05-26-22 @ 06:04)  Creatinine, Serum: 1.41 mg/dL (05-25-22 @ 06:00)  Creatinine, Serum: 1.48 mg/dL (05-24-22 @ 06:56)  Creatinine, Serum: 1.48 mg/dL (05-23-22 @ 06:44)      PT/INR - ( 29 May 2022 06:43 )   PT: 14.2 sec;   INR: 1.23 ratio                   INFLAMMATORY MARKERS  Auto Neutrophil #: 8.00 K/uL (05-28-22 @ 07:05)  Auto Lymphocyte #: 0.78 K/uL (05-28-22 @ 07:05)  Auto Lymphocyte #: 0.49 K/uL (05-26-22 @ 06:04)  Auto Neutrophil #: 10.06 K/uL (05-26-22 @ 06:04)  Auto Neutrophil #: 11.08 K/uL (05-23-22 @ 06:44)  Auto Lymphocyte #: 1.20 K/uL (05-23-22 @ 06:44)  Auto Neutrophil #: 10.55 K/uL (05-22-22 @ 06:23)  Auto Lymphocyte #: 1.67 K/uL (05-22-22 @ 06:23)  Auto Neutrophil #: 18.42 K/uL (05-21-22 @ 22:30)  Auto Lymphocyte #: 0.82 K/uL (05-21-22 @ 22:30)  Auto Neutrophil #: 20.65 K/uL (05-21-22 @ 20:28)  Auto Lymphocyte #: 1.84 K/uL (05-21-22 @ 20:28)    Lactate, Blood: 1.6 mmol/L (05-29-22 @ 06:43)  Lactate, Blood: 1.3 mmol/L (05-23-22 @ 06:44)  Lactate, Blood: 1.6 mmol/L (05-22-22 @ 15:05)  Lactate, Blood: 3.6 mmol/L (05-22-22 @ 10:01)  Lactate, Blood: 3.4 mmol/L (05-22-22 @ 06:23)  Lactate, Blood: 1.9 mmol/L (05-21-22 @ 20:28)    Auto Eosinophil #: 0.06 K/uL (05-28-22 @ 07:05)  Auto Eosinophil #: 0.14 K/uL (05-26-22 @ 06:04)  Auto Eosinophil #: 0.12 K/uL (05-23-22 @ 06:44)  Auto Eosinophil #: 0.04 K/uL (05-22-22 @ 06:23)  Auto Eosinophil #: 0.09 K/uL (05-21-22 @ 22:30)  Auto Eosinophil #: 0.00 K/uL (05-21-22 @ 20:28)    Lactate Dehydrogenase, Serum: 381 U/L (05-24-22 @ 06:56)      Procalcitonin, Serum: 0.67 ng/mL (05-29-22 @ 06:43)  Procalcitonin, Serum: 1.40 ng/mL (05-23-22 @ 06:44)            Ferritin, Serum: 565 ng/mL (05-23-22 @ 11:49)        INR: 1.23 ratio (05-29-22 @ 06:43)  INR: 1.20 ratio (05-24-22 @ 06:56)  INR: 1.29 ratio (05-23-22 @ 06:44)  Activated Partial Thromboplastin Time: 34.5 sec (05-22-22 @ 01:22)  INR: 1.23 ratio (05-22-22 @ 01:22)  Activated Partial Thromboplastin Time: 37.4 sec (05-21-22 @ 20:28)  INR: 1.17 ratio (05-21-22 @ 20:28)          MICROBIOLOGY:              RADIOLOGY & ADDITIONAL STUDIES:

## 2022-05-29 NOTE — PROGRESS NOTE ADULT - ASSESSMENT
80 y/o F with PMH of HTN, DM type 2, Cardiac Arrest, CVA, COPD, Chronic Respiratory Failure Vent Dependant s/p trach & PEG, Multiple Hospital Admissions for Aspiration & vent associated PNA, COVID-19 Infection, Anemia with GI blood loss, GERD, and Advanced Dementia presented with acute respiratory distress.    ct chest reviewed  vs noted  labs reviewed    HCP Marciano -  - pt is full code  s/p emp ABX - broad spectrum for poss PNA  cvs rx regimen  bronchodilators  oral and skin care  assist with needs - ADL  monitor VS and HD  CT imaging reviewed  Old records reviewed  suction PRN  trach care  peg care  nutritional optimization  decubitus prevention

## 2022-05-29 NOTE — PROGRESS NOTE ADULT - SUBJECTIVE AND OBJECTIVE BOX
Patient is a 76y old  Female who presents with a chief complaint of Acute respiratory distress. (29 May 2022 11:43)      INTERVAL HPI/OVERNIGHT EVENTS: Patient seen and examined at bedside. No overnight events    MEDICATIONS  (STANDING):  chlorhexidine 0.12% Liquid 15 milliLiter(s) Oral Mucosa every 12 hours  chlorhexidine 2% Cloths 1 Application(s) Topical <User Schedule>  chlorhexidine 4% Liquid 1 Application(s) Topical <User Schedule>  collagenase Ointment 1 Application(s) Topical daily  dextrose 5%. 1000 milliLiter(s) (50 mL/Hr) IV Continuous <Continuous>  dextrose 5%. 1000 milliLiter(s) (100 mL/Hr) IV Continuous <Continuous>  dextrose 50% Injectable 25 Gram(s) IV Push once  dextrose 50% Injectable 12.5 Gram(s) IV Push once  dextrose 50% Injectable 25 Gram(s) IV Push once  folic acid 1 milliGRAM(s) Oral daily  glucagon  Injectable 1 milliGRAM(s) IntraMuscular once  heparin   Injectable 5000 Unit(s) SubCutaneous every 8 hours  insulin glargine Injectable (LANTUS) 5 Unit(s) SubCutaneous every morning  insulin lispro (ADMELOG) corrective regimen sliding scale   SubCutaneous every 6 hours  lactobacillus acidophilus 1 Tablet(s) Oral daily  LORazepam     Tablet 1 milliGRAM(s) Oral every 4 hours  mineral oil/petrolatum Hydrophilic Ointment 1 Application(s) Topical daily  multivitamin 1 Tablet(s) Oral daily  nystatin Powder 1 Application(s) Topical three times a day  pantoprazole  Injectable 40 milliGRAM(s) IV Push daily  povidone iodine 10% Solution 1 Application(s) Topical daily    MEDICATIONS  (PRN):  acetaminophen    Suspension .. 650 milliGRAM(s) Oral every 6 hours PRN Temp greater or equal to 38C (100.4F), Mild Pain (1 - 3)  albuterol/ipratropium for Nebulization 3 milliLiter(s) Nebulizer every 6 hours PRN Shortness of Breath and/or Wheezing  dextrose Oral Gel 15 Gram(s) Oral once PRN Blood Glucose LESS THAN 70 milliGRAM(s)/deciliter  LORazepam   Injectable 1 milliGRAM(s) IV Push every 6 hours PRN agitation/vent dyssynchrony  sodium chloride 0.9% lock flush 10 milliLiter(s) IV Push every 1 hour PRN Pre/post blood products, medications, blood draw, and to maintain line patency      Allergies    codeine (Hives)    Intolerances        REVIEW OF SYSTEMS:  Unable to obtain meaningful ROS due to mental status      Vital Signs Last 24 Hrs  T(C): 37.7 (29 May 2022 12:10), Max: 37.7 (29 May 2022 12:10)  T(F): 99.9 (29 May 2022 12:10), Max: 99.9 (29 May 2022 12:10)  HR: 73 (29 May 2022 12:11) (71 - 105)  BP: 97/45 (29 May 2022 12:11) (94/49 - 133/61)  BP(mean): 61 (29 May 2022 12:11) (61 - 103)  RR: 24 (29 May 2022 12:11) (23 - 30)  SpO2: 98% (29 May 2022 12:11) (89% - 100%)    PHYSICAL EXAM:  GENERAL: chronically ill appearing, emaciated, NAD, obtunded  HEAD:  atraumatic  EYES: conjunctiva clear  NECK: (+)trach in place, clean  RESPIRATORY: coarse mechanical breath sounds, vent in place, no gross crackles or rales  CARDIOVASCULAR:  regular rate and rhythm, no murmurs or rubs or gallops, 2+ peripheral pulses  GASTROINTESTINAL:  soft, +PEG in place, clean and dry as well, nondistended, bowel sounds present  EXTREMITIES: no clubbing or cyanosis or edema  MUSCULOSKELETAL:  (+)diffuse contractures in all joints  NERVOUS SYSTEM: does not respond to pain    LABS:                        8.4    11.87 )-----------( 227      ( 29 May 2022 06:43 )             29.5     CBC Full  -  ( 29 May 2022 06:43 )  WBC Count : 11.87 K/uL  Hemoglobin : 8.4 g/dL  Hematocrit : 29.5 %  Platelet Count - Automated : 227 K/uL  Mean Cell Volume : 88.3 fl  Mean Cell Hemoglobin : 25.1 pg  Mean Cell Hemoglobin Concentration : 28.5 gm/dL  Auto Neutrophil # : x  Auto Lymphocyte # : x  Auto Monocyte # : x  Auto Eosinophil # : x  Auto Basophil # : x  Auto Neutrophil % : x  Auto Lymphocyte % : x  Auto Monocyte % : x  Auto Eosinophil % : x  Auto Basophil % : x    29 May 2022 06:43    143    |  109    |  28     ----------------------------<  150    4.0     |  24     |  1.13     Ca    8.3        29 May 2022 06:43  Phos  2.9       29 May 2022 06:43  Mg     1.6       29 May 2022 06:43    TPro  6.7    /  Alb  1.8    /  TBili  0.4    /  DBili  x      /  AST  12     /  ALT  27     /  AlkPhos  150    29 May 2022 06:43    PT/INR - ( 29 May 2022 06:43 )   PT: 14.2 sec;   INR: 1.23 ratio             CAPILLARY BLOOD GLUCOSE      POCT Blood Glucose.: 180 mg/dL (29 May 2022 11:30)  POCT Blood Glucose.: 158 mg/dL (29 May 2022 05:19)  POCT Blood Glucose.: 181 mg/dL (29 May 2022 01:41)  POCT Blood Glucose.: 176 mg/dL (28 May 2022 17:06)        Culture - Fungal, Body Fluid (collected 05-25-22 @ 10:39)  Source: .Body Fluid Pleural Fluid  Preliminary Report (05-26-22 @ 06:53):    Testing in progress    Culture - Body Fluid with Gram Stain (collected 05-25-22 @ 10:39)  Source: .Body Fluid Pleural Fluid  Gram Stain (05-25-22 @ 19:50):    polymorphonuclear leukocytes seen    No organisms seen    by cytocentrifuge    Culture - Acid Fast - Body Fluid w/Smear (collected 05-25-22 @ 10:39)  Source: .Body Fluid Pleural Fluid  Preliminary Report (05-28-22 @ 15:04):    Culture is being performed.    Culture - Sputum (collected 05-23-22 @ 11:20)  Source: Trach Asp Tracheal Aspirate  Gram Stain (05-23-22 @ 21:29):    Moderate polymorphonuclear leukocytes per low power field    No Squamous epithelial cells per low power field    Moderate Gram Negative Rods per oil power field  Final Report (05-25-22 @ 16:10):    Numerous Mixed gram negative rods including    Numerous Pseudomonas aeruginosa (Carbapenem Resistant)    Normal Respiratory Elvira absent  Organism: Pseudomonas aeruginosa (Carbapenem Resistant) (05-25-22 @ 16:10)  Organism: Pseudomonas aeruginosa (Carbapenem Resistant) (05-25-22 @ 16:10)      -  Amikacin: S <=16      -  Aztreonam: S <=4      -  Cefepime: S 4      -  Ceftazidime: S 8      -  Ciprofloxacin: S <=0.25      -  Gentamicin: S <=2      -  Imipenem: R >8      -  Levofloxacin: S <=0.5      -  Meropenem: R 8      -  Piperacillin/Tazobactam: S 16      -  Tobramycin: S <=2      Method Type: PRATIK        RADIOLOGY & ADDITIONAL TESTS:     Consultant(s) Notes Reviewed:  [x] YES  [ ] NO    Care Discussed with [x] Consultants  [x] Patient  [ ] Family  [ ]      [ x] Other; RN  DVT ppx

## 2022-05-30 LAB
ANION GAP SERPL CALC-SCNC: 7 MMOL/L — SIGNIFICANT CHANGE UP (ref 5–17)
BLD GP AB SCN SERPL QL: SIGNIFICANT CHANGE UP
BUN SERPL-MCNC: 28 MG/DL — HIGH (ref 7–23)
CALCIUM SERPL-MCNC: 8.4 MG/DL — SIGNIFICANT CHANGE UP (ref 8.4–10.5)
CHLORIDE SERPL-SCNC: 112 MMOL/L — HIGH (ref 96–108)
CO2 SERPL-SCNC: 26 MMOL/L — SIGNIFICANT CHANGE UP (ref 22–31)
CREAT SERPL-MCNC: 1.21 MG/DL — SIGNIFICANT CHANGE UP (ref 0.5–1.3)
CULTURE RESULTS: NO GROWTH — SIGNIFICANT CHANGE UP
EGFR: 46 ML/MIN/1.73M2 — LOW
GLUCOSE SERPL-MCNC: 183 MG/DL — HIGH (ref 70–99)
HCT VFR BLD CALC: 25.8 % — LOW (ref 34.5–45)
HCT VFR BLD CALC: 26.2 % — LOW (ref 34.5–45)
HGB BLD-MCNC: 7.5 G/DL — LOW (ref 11.5–15.5)
HGB BLD-MCNC: 7.7 G/DL — LOW (ref 11.5–15.5)
MCHC RBC-ENTMCNC: 25.5 PG — LOW (ref 27–34)
MCHC RBC-ENTMCNC: 29.1 GM/DL — LOW (ref 32–36)
MCV RBC AUTO: 87.8 FL — SIGNIFICANT CHANGE UP (ref 80–100)
NRBC # BLD: 0 /100 WBCS — SIGNIFICANT CHANGE UP (ref 0–0)
PLATELET # BLD AUTO: 198 K/UL — SIGNIFICANT CHANGE UP (ref 150–400)
POTASSIUM SERPL-MCNC: 4.9 MMOL/L — SIGNIFICANT CHANGE UP (ref 3.5–5.3)
POTASSIUM SERPL-SCNC: 4.9 MMOL/L — SIGNIFICANT CHANGE UP (ref 3.5–5.3)
RBC # BLD: 2.94 M/UL — LOW (ref 3.8–5.2)
RBC # FLD: 21.2 % — HIGH (ref 10.3–14.5)
SODIUM SERPL-SCNC: 145 MMOL/L — SIGNIFICANT CHANGE UP (ref 135–145)
VIT B12 SERPL-MCNC: 1716 PG/ML — HIGH (ref 232–1245)
WBC # BLD: 9.68 K/UL — SIGNIFICANT CHANGE UP (ref 3.8–10.5)
WBC # FLD AUTO: 9.68 K/UL — SIGNIFICANT CHANGE UP (ref 3.8–10.5)

## 2022-05-30 PROCEDURE — 99233 SBSQ HOSP IP/OBS HIGH 50: CPT

## 2022-05-30 RX ADMIN — Medication 2: at 17:18

## 2022-05-30 RX ADMIN — Medication 1 TABLET(S): at 11:02

## 2022-05-30 RX ADMIN — Medication 1 MILLIGRAM(S): at 09:21

## 2022-05-30 RX ADMIN — HEPARIN SODIUM 5000 UNIT(S): 5000 INJECTION INTRAVENOUS; SUBCUTANEOUS at 05:13

## 2022-05-30 RX ADMIN — NYSTATIN CREAM 1 APPLICATION(S): 100000 CREAM TOPICAL at 13:09

## 2022-05-30 RX ADMIN — Medication 2: at 11:14

## 2022-05-30 RX ADMIN — Medication 1 APPLICATION(S): at 11:01

## 2022-05-30 RX ADMIN — HEPARIN SODIUM 5000 UNIT(S): 5000 INJECTION INTRAVENOUS; SUBCUTANEOUS at 13:08

## 2022-05-30 RX ADMIN — Medication 1 MILLIGRAM(S): at 11:02

## 2022-05-30 RX ADMIN — Medication 1 MILLIGRAM(S): at 13:09

## 2022-05-30 RX ADMIN — NYSTATIN CREAM 1 APPLICATION(S): 100000 CREAM TOPICAL at 22:05

## 2022-05-30 RX ADMIN — Medication 1 APPLICATION(S): at 12:01

## 2022-05-30 RX ADMIN — Medication 2: at 05:14

## 2022-05-30 RX ADMIN — PANTOPRAZOLE SODIUM 40 MILLIGRAM(S): 20 TABLET, DELAYED RELEASE ORAL at 11:02

## 2022-05-30 RX ADMIN — Medication 1 MILLIGRAM(S): at 17:08

## 2022-05-30 RX ADMIN — CHLORHEXIDINE GLUCONATE 1 APPLICATION(S): 213 SOLUTION TOPICAL at 05:13

## 2022-05-30 RX ADMIN — NYSTATIN CREAM 1 APPLICATION(S): 100000 CREAM TOPICAL at 05:13

## 2022-05-30 RX ADMIN — INSULIN GLARGINE 5 UNIT(S): 100 INJECTION, SOLUTION SUBCUTANEOUS at 08:01

## 2022-05-30 RX ADMIN — Medication 2: at 23:18

## 2022-05-30 RX ADMIN — CHLORHEXIDINE GLUCONATE 15 MILLILITER(S): 213 SOLUTION TOPICAL at 05:13

## 2022-05-30 RX ADMIN — Medication 1 MILLIGRAM(S): at 04:37

## 2022-05-30 RX ADMIN — CHLORHEXIDINE GLUCONATE 15 MILLILITER(S): 213 SOLUTION TOPICAL at 17:08

## 2022-05-30 RX ADMIN — Medication 1 MILLIGRAM(S): at 06:12

## 2022-05-30 RX ADMIN — HEPARIN SODIUM 5000 UNIT(S): 5000 INJECTION INTRAVENOUS; SUBCUTANEOUS at 21:40

## 2022-05-30 RX ADMIN — Medication 1 MILLIGRAM(S): at 21:40

## 2022-05-30 NOTE — PROGRESS NOTE ADULT - SUBJECTIVE AND OBJECTIVE BOX
Patient is a 76y old  Female who presents with a chief complaint of Acute respiratory distress. (30 May 2022 08:49)      INTERVAL HPI/OVERNIGHT EVENTS: Patient seen and examined at bedside. No overnight events    MEDICATIONS  (STANDING):  chlorhexidine 0.12% Liquid 15 milliLiter(s) Oral Mucosa every 12 hours  chlorhexidine 2% Cloths 1 Application(s) Topical <User Schedule>  chlorhexidine 4% Liquid 1 Application(s) Topical <User Schedule>  collagenase Ointment 1 Application(s) Topical daily  dextrose 5%. 1000 milliLiter(s) (100 mL/Hr) IV Continuous <Continuous>  dextrose 5%. 1000 milliLiter(s) (50 mL/Hr) IV Continuous <Continuous>  dextrose 50% Injectable 25 Gram(s) IV Push once  dextrose 50% Injectable 12.5 Gram(s) IV Push once  dextrose 50% Injectable 25 Gram(s) IV Push once  folic acid 1 milliGRAM(s) Oral daily  glucagon  Injectable 1 milliGRAM(s) IntraMuscular once  heparin   Injectable 5000 Unit(s) SubCutaneous every 8 hours  insulin glargine Injectable (LANTUS) 5 Unit(s) SubCutaneous every morning  insulin lispro (ADMELOG) corrective regimen sliding scale   SubCutaneous every 6 hours  lactobacillus acidophilus 1 Tablet(s) Oral daily  LORazepam     Tablet 1 milliGRAM(s) Oral every 4 hours  mineral oil/petrolatum Hydrophilic Ointment 1 Application(s) Topical daily  multivitamin 1 Tablet(s) Oral daily  nystatin Powder 1 Application(s) Topical three times a day  pantoprazole  Injectable 40 milliGRAM(s) IV Push daily  povidone iodine 10% Solution 1 Application(s) Topical daily    MEDICATIONS  (PRN):  acetaminophen    Suspension .. 650 milliGRAM(s) Oral every 6 hours PRN Temp greater or equal to 38C (100.4F), Mild Pain (1 - 3)  albuterol/ipratropium for Nebulization 3 milliLiter(s) Nebulizer every 6 hours PRN Shortness of Breath and/or Wheezing  dextrose Oral Gel 15 Gram(s) Oral once PRN Blood Glucose LESS THAN 70 milliGRAM(s)/deciliter  LORazepam   Injectable 1 milliGRAM(s) IV Push every 6 hours PRN agitation/vent dyssynchrony  sodium chloride 0.9% lock flush 10 milliLiter(s) IV Push every 1 hour PRN Pre/post blood products, medications, blood draw, and to maintain line patency      Allergies    codeine (Hives)    Intolerances        REVIEW OF SYSTEMS:  Unable to obtain meaningful ROS due to mental status      Vital Signs Last 24 Hrs  T(C): 36.2 (30 May 2022 08:45), Max: 37.7 (29 May 2022 12:10)  T(F): 97.1 (30 May 2022 08:45), Max: 99.9 (29 May 2022 12:10)  HR: 82 (30 May 2022 10:00) (64 - 82)  BP: 122/60 (30 May 2022 10:00) (91/47 - 124/59)  BP(mean): 79 (30 May 2022 10:00) (61 - 82)  RR: 26 (30 May 2022 10:00) (14 - 26)  SpO2: 100% (30 May 2022 10:00) (93% - 100%)      PHYSICAL EXAM:  GENERAL: chronically ill appearing, emaciated, NAD, obtunded  HEAD:  atraumatic  EYES: conjunctiva clear  NECK: (+)trach in place, clean  RESPIRATORY: coarse mechanical breath sounds, vent in place, no gross crackles or rales  CARDIOVASCULAR:  regular rate and rhythm, no murmurs or rubs or gallops, 2+ peripheral pulses  GASTROINTESTINAL:  soft, +PEG in place, clean and dry as well, nondistended, bowel sounds present  EXTREMITIES: no clubbing or cyanosis or edema  MUSCULOSKELETAL:  (+)diffuse contractures in all joints  NERVOUS SYSTEM: does not respond to pain    LABS:                        7.5    9.68  )-----------( 198      ( 30 May 2022 06:28 )             25.8     CBC Full  -  ( 30 May 2022 06:28 )  WBC Count : 9.68 K/uL  Hemoglobin : 7.5 g/dL  Hematocrit : 25.8 %  Platelet Count - Automated : 198 K/uL  Mean Cell Volume : 87.8 fl  Mean Cell Hemoglobin : 25.5 pg  Mean Cell Hemoglobin Concentration : 29.1 gm/dL  Auto Neutrophil # : x  Auto Lymphocyte # : x  Auto Monocyte # : x  Auto Eosinophil # : x  Auto Basophil # : x  Auto Neutrophil % : x  Auto Lymphocyte % : x  Auto Monocyte % : x  Auto Eosinophil % : x  Auto Basophil % : x    30 May 2022 06:28    145    |  112    |  28     ----------------------------<  183    4.9     |  26     |  1.21     Ca    8.4        30 May 2022 06:28      PT/INR - ( 29 May 2022 06:43 )   PT: 14.2 sec;   INR: 1.23 ratio             CAPILLARY BLOOD GLUCOSE      POCT Blood Glucose.: 187 mg/dL (30 May 2022 11:12)  POCT Blood Glucose.: 181 mg/dL (30 May 2022 05:10)  POCT Blood Glucose.: 185 mg/dL (29 May 2022 23:39)  POCT Blood Glucose.: 169 mg/dL (29 May 2022 17:17)        Culture - Fungal, Body Fluid (collected 05-25-22 @ 10:39)  Source: .Body Fluid Pleural Fluid  Preliminary Report (05-26-22 @ 06:53):    Testing in progress    Culture - Body Fluid with Gram Stain (collected 05-25-22 @ 10:39)  Source: .Body Fluid Pleural Fluid  Gram Stain (05-25-22 @ 19:50):    polymorphonuclear leukocytes seen    No organisms seen    by cytocentrifuge  Final Report (05-30-22 @ 09:04):    No growth    Culture - Acid Fast - Body Fluid w/Smear (collected 05-25-22 @ 10:39)  Source: .Body Fluid Pleural Fluid  Preliminary Report (05-28-22 @ 15:04):    Culture is being performed.        RADIOLOGY & ADDITIONAL TESTS:     Consultant(s) Notes Reviewed:  [x] YES  [ ] NO    Care Discussed with [x] Consultants  [x] Patient  [ ] Family  [ ]      [ x] Other; RN  DVT ppx

## 2022-05-30 NOTE — PROGRESS NOTE ADULT - SUBJECTIVE AND OBJECTIVE BOX
BREA BECKHAM     [facility]    Allergies    codeine (Hives)    Intolerances        PAST MEDICAL & SURGICAL HISTORY:  Dementia of frontal lobe type      Aphasic stroke      Diabetes mellitus      Respiratory failure      Hypertension      GERD (gastroesophageal reflux disease)      Constipation      Respiratory failure      CVA (cerebral vascular accident)      HTN (hypertension)      DM (diabetes mellitus)      Advanced dementia      COVID-19 virus detected      Quadriplegia      Pneumonia      Type II diabetes mellitus      Hx of appendectomy      Gastrostomy in place      Tracheostomy in place      Tracheostomy tube present      Feeding by G-tube          FAMILY HISTORY:  No pertinent family history in first degree relatives        Home Medications:  albuterol 90 mcg/inh inhalation aerosol with adapter: 2  inhaled every 6 hours (21 May 2022 21:16)  Bacid (LAC) oral tablet: 2 tab(s) by gastrostomy tube once a day (21 May 2022 21:16)  Betadine 10% topical swab: cleanse right and left great toes (21 May 2022 21:16)  Carafate 1 g/10 mL oral suspension: 10 milliliter(s) by gastrostomy tube 4 times a day (before meals and at bedtime) for 14 days (Started 6/4/21) (21 May 2022 21:16)  chlorhexidine 0.12% mucous membrane liquid: 15 milliliter(s) mucous membrane 2 times a day (21 May 2022 21:16)  Eucerin topical cream: Apply topically to affected area once a day bilateral feet (21 May 2022 21:16)  folic acid 1 mg oral tablet: 1 tab(s) orally once a day (21 May 2022 21:16)  insulin glargine 100 units/mL subcutaneous solution: 5 unit(s) subcutaneous once a day (at bedtime) (29 May 2022 16:35)  ipratropium-albuterol 0.5 mg-2.5 mg/3 mL inhalation solution: 3 milliliter(s) inhaled 4 times a day (21 May 2022 21:16)  LORazepam 1 mg oral tablet: 1 tab(s) by gastrostomy tube every 4 hours (21 May 2022 21:16)  methocarbamol 500 mg oral tablet: 1 tab(s) by gastrostomy tube 2 times a day (21 May 2022 21:16)  MiraLax oral powder for reconstitution:  (21 May 2022 23:14)  Multiple Vitamins oral tablet: 1 tab(s) orally once a day (21 May 2022 21:16)  nystatin 100,000 units/g topical powder: 1 application topically 3 times a day (29 May 2022 16:35)  omeprazole 20 mg oral delayed release capsule: orally 2 times a day (21 May 2022 23:14)  polyethylene glycol 3350 oral powder for reconstitution: 17 gram(s) by gastrostomy tube every 12 hours (21 May 2022 21:16)  senna 8.6 mg oral tablet: 3 tab(s) by gastrostomy tube once a day (at bedtime) (21 May 2022 21:16)  simethicone 80 mg oral tablet, chewable: 1 tab(s) by gastrostomy tube every 6 hours (21 May 2022 21:16)  Tylenol 325 mg oral tablet: 2 tab(s) by gastrostomy tube once a day; 60 minutes prior to dressing change  (21 May 2022 21:16)  Tylenol 325 mg oral tablet: 2 tab(s) by gastrostomy tube every 6 hours, As Needed (21 May 2022 21:16)      MEDICATIONS  (STANDING):  chlorhexidine 0.12% Liquid 15 milliLiter(s) Oral Mucosa every 12 hours  chlorhexidine 2% Cloths 1 Application(s) Topical <User Schedule>  chlorhexidine 4% Liquid 1 Application(s) Topical <User Schedule>  collagenase Ointment 1 Application(s) Topical daily  dextrose 5%. 1000 milliLiter(s) (50 mL/Hr) IV Continuous <Continuous>  dextrose 5%. 1000 milliLiter(s) (100 mL/Hr) IV Continuous <Continuous>  dextrose 50% Injectable 25 Gram(s) IV Push once  dextrose 50% Injectable 12.5 Gram(s) IV Push once  dextrose 50% Injectable 25 Gram(s) IV Push once  folic acid 1 milliGRAM(s) Oral daily  glucagon  Injectable 1 milliGRAM(s) IntraMuscular once  heparin   Injectable 5000 Unit(s) SubCutaneous every 8 hours  insulin glargine Injectable (LANTUS) 5 Unit(s) SubCutaneous every morning  insulin lispro (ADMELOG) corrective regimen sliding scale   SubCutaneous every 6 hours  lactobacillus acidophilus 1 Tablet(s) Oral daily  LORazepam     Tablet 1 milliGRAM(s) Oral every 4 hours  mineral oil/petrolatum Hydrophilic Ointment 1 Application(s) Topical daily  multivitamin 1 Tablet(s) Oral daily  nystatin Powder 1 Application(s) Topical three times a day  pantoprazole  Injectable 40 milliGRAM(s) IV Push daily  povidone iodine 10% Solution 1 Application(s) Topical daily    MEDICATIONS  (PRN):  acetaminophen    Suspension .. 650 milliGRAM(s) Oral every 6 hours PRN Temp greater or equal to 38C (100.4F), Mild Pain (1 - 3)  albuterol/ipratropium for Nebulization 3 milliLiter(s) Nebulizer every 6 hours PRN Shortness of Breath and/or Wheezing  dextrose Oral Gel 15 Gram(s) Oral once PRN Blood Glucose LESS THAN 70 milliGRAM(s)/deciliter  LORazepam   Injectable 1 milliGRAM(s) IV Push every 6 hours PRN agitation/vent dyssynchrony  sodium chloride 0.9% lock flush 10 milliLiter(s) IV Push every 1 hour PRN Pre/post blood products, medications, blood draw, and to maintain line patency      Diet, NPO with Tube Feed:   Tube Feeding Modality: Gastrostomy  Glucerna 1.5 Horacio  Total Volume for 24 Hours (mL): 1200  Continuous  Starting Tube Feed Rate mL per Hour: 20  Increase Tube Feed Rate by (mL): 10     Every 6 hours  Until Goal Tube Feed Rate (mL per Hour): 60  Tube Feed Duration (in Hours): 20  Tube Feed Start Time: 00:00 (05-22-22 @ 11:42) [Active]          Vital Signs Last 24 Hrs  T(C): 36.6 (30 May 2022 04:10), Max: 37.7 (29 May 2022 12:10)  T(F): 97.8 (30 May 2022 04:10), Max: 99.9 (29 May 2022 12:10)  HR: 82 (30 May 2022 08:00) (64 - 105)  BP: 122/64 (30 May 2022 08:00) (91/47 - 133/61)  BP(mean): 82 (30 May 2022 08:00) (61 - 83)  RR: 26 (30 May 2022 08:00) (14 - 30)  SpO2: 100% (30 May 2022 08:00) (93% - 100%)      05-29-22 @ 07:01  -  05-30-22 @ 07:00  --------------------------------------------------------  IN: 1380 mL / OUT: 1 mL / NET: 1379 mL    05-30-22 @ 07:01  -  05-30-22 @ 08:49  --------------------------------------------------------  IN: 120 mL / OUT: 0 mL / NET: 120 mL        Mode: AC/ CMV (Assist Control/ Continuous Mandatory Ventilation), RR (machine): 26, TV (machine): 400, FiO2: 40, PEEP: 5, ITime: 0.9, MAP: 17, PIP: 33      LABS:                        7.5    9.68  )-----------( 198      ( 30 May 2022 06:28 )             25.8     05-30    145  |  112<H>  |  28<H>  ----------------------------<  183<H>  4.9   |  26  |  1.21    Ca    8.4      30 May 2022 06:28  Phos  2.9     05-29  Mg     1.6     05-29    TPro  6.7  /  Alb  1.8<L>  /  TBili  0.4  /  DBili  x   /  AST  12  /  ALT  27  /  AlkPhos  150<H>  05-29    PT/INR - ( 29 May 2022 06:43 )   PT: 14.2 sec;   INR: 1.23 ratio                   WBC:  WBC Count: 9.68 K/uL (05-30 @ 06:28)  WBC Count: 11.87 K/uL (05-29 @ 06:43)  WBC Count: 9.45 K/uL (05-28 @ 07:05)  WBC Count: 11.79 K/uL (05-27 @ 06:00)      MICROBIOLOGY:  RECENT CULTURES:  05-25 .Body Fluid Pleural Fluid XXXX   polymorphonuclear leukocytes seen  No organisms seen  by cytocentrifuge   Culture is being performed.    05-23 Trach Asp Tracheal Aspirate Pseudomonas aeruginosa (Carbapenem Resistant)   Moderate polymorphonuclear leukocytes per low power field  No Squamous epithelial cells per low power field  Moderate Gram Negative Rods per oil power field   Numerous Mixed gram negative rods including  Numerous Pseudomonas aeruginosa (Carbapenem Resistant)  Normal Respiratory Elvira absent                PT/INR - ( 29 May 2022 06:43 )   PT: 14.2 sec;   INR: 1.23 ratio             Sodium:  Sodium, Serum: 145 mmol/L (05-30 @ 06:28)  Sodium, Serum: 143 mmol/L (05-29 @ 06:43)  Sodium, Serum: 142 mmol/L (05-28 @ 07:05)  Sodium, Serum: 139 mmol/L (05-27 @ 06:00)      1.21 mg/dL 05-30 @ 06:28  1.13 mg/dL 05-29 @ 06:43  1.26 mg/dL 05-28 @ 07:05  1.32 mg/dL 05-27 @ 06:00      Hemoglobin:  Hemoglobin: 7.5 g/dL (05-30 @ 06:28)  Hemoglobin: 8.4 g/dL (05-29 @ 06:43)  Hemoglobin: 8.3 g/dL (05-28 @ 07:05)  Hemoglobin: 8.4 g/dL (05-27 @ 06:00)      Platelets: Platelet Count - Automated: 198 K/uL (05-30 @ 06:28)  Platelet Count - Automated: 227 K/uL (05-29 @ 06:43)  Platelet Count - Automated: 246 K/uL (05-28 @ 07:05)  Platelet Count - Automated: 242 K/uL (05-27 @ 06:00)      LIVER FUNCTIONS - ( 29 May 2022 06:43 )  Alb: 1.8 g/dL / Pro: 6.7 g/dL / ALK PHOS: 150 U/L / ALT: 27 U/L DA / AST: 12 U/L / GGT: x                 RADIOLOGY & ADDITIONAL STUDIES:      MICROBIOLOGY:  RECENT CULTURES:  05-25 .Body Fluid Pleural Fluid XXXX   polymorphonuclear leukocytes seen  No organisms seen  by cytocentrifuge   Culture is being performed.    05-23 Trach Asp Tracheal Aspirate Pseudomonas aeruginosa (Carbapenem Resistant)   Moderate polymorphonuclear leukocytes per low power field  No Squamous epithelial cells per low power field  Moderate Gram Negative Rods per oil power field   Numerous Mixed gram negative rods including  Numerous Pseudomonas aeruginosa (Carbapenem Resistant)  Normal Respiratory Elvira absent

## 2022-05-30 NOTE — PROGRESS NOTE ADULT - ASSESSMENT
The patient is a 76 year old female with a history of HTN, DM, CVA, dementia, chronic respiratory failure s/p trach, PEG, cardiac arrest, anemia who presents with respiratory distress.    Plan:  - ECG with no evidence of ischemia or infarction  - Troponin elevated at 257 in the setting of respiratory failure and septic shock. No need to trend further.  - Echo 9/21 with normal LV systolic function, mod pulm HTN  - Completed ceftazidime  - Pleural effusion - consistent with exudate - likely parapneumonic  - Pulm and ID follow-up

## 2022-05-30 NOTE — PROGRESS NOTE ADULT - SUBJECTIVE AND OBJECTIVE BOX
Chief Complaint: Respiratory distress    Interval Events: No events overnight.    Review of Systems:  Unable to obtain    Physical Exam:  Vital Signs Last 24 Hrs  T(C): 36.6 (30 May 2022 04:10), Max: 37.7 (29 May 2022 12:10)  T(F): 97.8 (30 May 2022 04:10), Max: 99.9 (29 May 2022 12:10)  HR: 82 (30 May 2022 08:00) (64 - 105)  BP: 122/64 (30 May 2022 08:00) (91/47 - 133/61)  BP(mean): 82 (30 May 2022 08:00) (61 - 83)  RR: 26 (30 May 2022 08:00) (14 - 30)  SpO2: 100% (30 May 2022 08:00) (93% - 100%)  General: NAD  HEENT: Trach  Neck: No JVD, no carotid bruit  Lungs: Coarse bilaterally  CV: RRR, nl S1/S2, no M/R/G  Abdomen: S/NT/ND, +BS  Extremities: No LE edema, no cyanosis  Neuro: AAOx0  Skin: No rash    Labs:    05-30    145  |  112<H>  |  28<H>  ----------------------------<  183<H>  4.9   |  26  |  1.21    Ca    8.4      30 May 2022 06:28  Phos  2.9     05-29  Mg     1.6     05-29    TPro  6.7  /  Alb  1.8<L>  /  TBili  0.4  /  DBili  x   /  AST  12  /  ALT  27  /  AlkPhos  150<H>  05-29                        7.5    9.68  )-----------( 198      ( 30 May 2022 06:28 )             25.8       Telemetry: Sinus rhythm

## 2022-05-30 NOTE — PROGRESS NOTE ADULT - ASSESSMENT
79F HTN, DM2, COPD, Chronic Respiratory Failure on Trach to Vent admitted for Acute on Chronic Respiratory Failure.     Acute on Chronic Respiratory Failure / Septic Shock   Aspiration vs. VAP vs ; History of CRE and Psuedomonas; Remains Afebrile;   S/P IR Thoracentesis (L) 1500cc and will follow up cultures; MRSA negative  Completed course of abx   ID and Pulmonary consults appreciated   Discussed with pulm and cardio, fluid studies suggestive of exudate but may be from CHF  TTE- Normal LV, dilated RV, mod pulm htn, small pericardial effusion    Anemia of Chronic Disease with Iron Deficiency  Has been evaluated by GI and Hematology on prior admissions  She has Anemia of Chronic Disease but could also have intermittent GI Bleeding; Occult Blood Negative  S/P 1U PRBC Transfusion and IV Venofer  Multivitamin daily    Acute Renal Failure on CKD 3  Baseline Cr around 1.2  Improving - Most likely due to Sepsis   Will continue gentle hydration and maintain BP   Renally dose and monitor BMP and electrolytes     HTN  Hold all BP lower agents    DM2  FS and on ISS to maintain BS <180    Diet  Tube Feeds    DVT Prophylaxis  Heparin    Disposition  Full Code   Discharge planning pending culture results and antibiotic regimen

## 2022-05-30 NOTE — CHART NOTE - NSCHARTNOTEFT_GEN_A_CORE
Assessment: Pt seen for nutrition follow-up. Per H&P, pt is a "78 y/o F with PMH of HTN, DM type 2, Cardiac Arrest, CVA, COPD, Chronic Respiratory Failure Vent Dependant s/p trach & PEG, Multiple Hospital Admissions for Aspiration & vent associated PNA, COVID-19 Infection, Anemia with GI blood loss, GERD, and Advanced Dementia who was sent from [facility] facility for acute respiratory distress with unknown onset, no reported fever or chills. On arrival to ED she was found with tachypnea & dropping of her SPO2 on same vent settings, CT chest without contrast revealed worse B/L PNA & B/L pleural effusion compared with her CT one month ago. Patient was discharged from hospital 3 weeks ago after admission for a similar condition, No history could obtained given her status."    5/30  Nutrition consult previously ordered for pressure ulcer stage II or >. Pt admitted with stage II to sacrum, stage III to R buttock, SDTI to R lateral foot and L under foot.  Pt with hx trach/PEG.  Per transfer documents, pt was receiving Glucerna 1.5 to goal 60ml/hr x 20hrs (was providing 1200ml TV formula, 1800kcal, 99g protein; also receiving 250ml free water q 6hrs, +25ml hourly flushes). Pt previously with abdominal distention earlier in admission; was receiving tube feeds at low rate of 20ml/hr. Pt s/p thoracentesis with removal of 1500 cc on 5/25. Tube feed now currently active for Glucerna 1.5, starting at 20ml/hr increase by 10ml every 6 hours until goal 60ml/hr x 20hrs is reached (provides 1200 ml TV formula, 1800kcal based on 31kcal/kg IBW, 99g protein based on 1.7g/kg IBW, 912ml free water from formula). Pt currently tolerating TF well at goal rate (60ml/hr). Recommend addition of standard 25ml/hr free water flushes; labs reviewed - Na trending upwards.  Currently not on IVF.  +BM 5/30.  RD to follow closely and will continue to monitor pt's nutrition status.    Factors impacting intake: [ ] none [ ] nausea  [ ] vomiting [ ] diarrhea [ ] constipation  [ ]chewing problems [ ] swallowing issues  [ x] other: trach, peg    Diet Prescription: Diet, NPO with Tube Feed:   Tube Feeding Modality: Gastrostomy  Glucerna 1.5 Horacio  Total Volume for 24 Hours (mL): 1200  Continuous  Starting Tube Feed Rate {mL per Hour}: 20  Increase Tube Feed Rate by (mL): 10     Every 6 hours  Until Goal Tube Feed Rate (mL per Hour): 60  Tube Feed Duration (in Hours): 20  Tube Feed Start Time: 00:00 (05-22-22 @ 11:42)    Intake: tolerating TF well at 60ml/hr    Current Weight:   % Weight Change  adm wt 117#  5/30 116.4#    Pertinent Medications: MEDICATIONS  (STANDING):  chlorhexidine 0.12% Liquid 15 milliLiter(s) Oral Mucosa every 12 hours  chlorhexidine 2% Cloths 1 Application(s) Topical <User Schedule>  chlorhexidine 4% Liquid 1 Application(s) Topical <User Schedule>  collagenase Ointment 1 Application(s) Topical daily  dextrose 5%. 1000 milliLiter(s) (50 mL/Hr) IV Continuous <Continuous>  dextrose 5%. 1000 milliLiter(s) (100 mL/Hr) IV Continuous <Continuous>  dextrose 50% Injectable 25 Gram(s) IV Push once  dextrose 50% Injectable 12.5 Gram(s) IV Push once  dextrose 50% Injectable 25 Gram(s) IV Push once  folic acid 1 milliGRAM(s) Oral daily  glucagon  Injectable 1 milliGRAM(s) IntraMuscular once  heparin   Injectable 5000 Unit(s) SubCutaneous every 8 hours  insulin glargine Injectable (LANTUS) 5 Unit(s) SubCutaneous every morning  insulin lispro (ADMELOG) corrective regimen sliding scale   SubCutaneous every 6 hours  lactobacillus acidophilus 1 Tablet(s) Oral daily  LORazepam     Tablet 1 milliGRAM(s) Oral every 4 hours  mineral oil/petrolatum Hydrophilic Ointment 1 Application(s) Topical daily  multivitamin 1 Tablet(s) Oral daily  nystatin Powder 1 Application(s) Topical three times a day  pantoprazole  Injectable 40 milliGRAM(s) IV Push daily  povidone iodine 10% Solution 1 Application(s) Topical daily    MEDICATIONS  (PRN):  acetaminophen    Suspension .. 650 milliGRAM(s) Oral every 6 hours PRN Temp greater or equal to 38C (100.4F), Mild Pain (1 - 3)  albuterol/ipratropium for Nebulization 3 milliLiter(s) Nebulizer every 6 hours PRN Shortness of Breath and/or Wheezing  dextrose Oral Gel 15 Gram(s) Oral once PRN Blood Glucose LESS THAN 70 milliGRAM(s)/deciliter  LORazepam   Injectable 1 milliGRAM(s) IV Push every 6 hours PRN agitation/vent dyssynchrony  sodium chloride 0.9% lock flush 10 milliLiter(s) IV Push every 1 hour PRN Pre/post blood products, medications, blood draw, and to maintain line patency    Pertinent Labs: 05-30 Na145 mmol/L Glu 183 mg/dL<H> K+ 4.9 mmol/L Cr  1.21 mg/dL BUN 28 mg/dL<H> 05-29 Phos 2.9 mg/dL 05-29 Alb 1.8 g/dL<L>     CAPILLARY BLOOD GLUCOSE      POCT Blood Glucose.: 187 mg/dL (30 May 2022 11:12)  POCT Blood Glucose.: 181 mg/dL (30 May 2022 05:10)  POCT Blood Glucose.: 185 mg/dL (29 May 2022 23:39)  POCT Blood Glucose.: 169 mg/dL (29 May 2022 17:17)    Skin:  pressure ulcers; stage II to sacrum, stage III to R buttock, SDTI to R lateral foot and L under foot    Estimated Needs:   [x ] no change since previous assessment  [ ] recalculated:     Previous Nutrition Diagnosis:   [ ] Inadequate Energy Intake [ ]Inadequate Oral Intake [ ] Excessive Energy Intake   [ ] Underweight [ x] Increased Nutrient Needs [ ] Overweight/Obesity   [ ] Altered GI Function [ ] Unintended Weight Loss [ ] Food & Nutrition Related Knowledge Deficit [ ] Malnutrition     Nutrition Diagnosis is [x ] ongoing  [ ] resolved [ ] not applicable     New Nutrition Diagnosis: [ ] not applicable       Interventions: Continue nutrition care plan, tube feed via peg of Glucerna 1.5; increase until goal rate of 60ml/hr x 20hrs is reached; add 25ml/hr free water flushes  Recommend  [ ] Change Diet To:  [ ] Nutrition Supplement  [ ] Nutrition Support  [ ] Other:     Monitoring and Evaluation:   [ ] PO intake [ x ] Tolerance to EN prescription [ x ] weights [ x ] labs[ x ] follow up per protocol  [ ] other:

## 2022-05-30 NOTE — PROGRESS NOTE ADULT - ASSESSMENT
REVIEW OF SYMPTOMS      Able to give (reliable) ROS  NO     PHYSICAL EXAM    HEENT Unremarkable  atraumatic   RESP Fair air entry EXP prolonged    Harsh breath sound Resp distres mild   CARDIAC S1 S2 No S3     NO JVD    ABDOMEN SOFT BS PRESENT NOT DISTENDED No hepatosplenomegaly   PEDAL EDEMA present No calf tenderness  NO rash       AGE/SEX.   76 f    DOA.  5/21/2022     CC .  5/21/2022 AOC RESP FAILURE     PMH .  pmh Hosp stay given zosyn 4/21  pmh Pneumonia    pmh HTN,   pmh DM,   pmh CVA,   pmh  chronic respiratory failure   pmh s/p trach, PEG,   pmh cardiac arrest      MAIN ISSUES  .  TRACH PERF REPORTED BY RADIO 5/25 RULED OUT ON CT    SEVERE HYPOXIC RESP  FAILURE poa   VAP poa   SHOCK poa   BL PL EFFSNS   5/25/2022 Thoracentesis  CHF  PULM HYTN   ANEMIA 5/23/2022 Hb 6.8   ELEVATED LFTS       COVID/ICU/CODE STATUS.                       COVID  STATUS. 5/21/2022 scv2 (-)           ICU STAY. 5/21  GOC.  5/24/2022 full code    BEST PRACTICE ISSUES.                                                  HEAD OF BED ELEVATION. Yes  DVT PROPHYLAXIS.     5/21/2022 hpsc    SQUIRES PROPHYLAXIS.5/22/2022 protonix 40   INFECTION PROPHYLAXIS.   5/21/2022 Ch;lorhexidine .12%   5/21/2022 Chlorhexidine 2%                                                                                         DIET.  5/22 glucerna 1.5 1200 gt      VITALS/PO/IO/VENT/DRIPS.   5/30/2022 afeb 85 110/60   5/30/2022 ac 26/400/5/.4      PROBLEM DATA/ASSESSMENT RECOMMENDATIONS (A/R).    HEMODYNAMICS.   Monitor and optimize bp   Target MAP to miguel  65 (+)    RESP.   Monitor and optimize  po   Target po to remain 90-95%    ABG.  5/23/2022 5a ac 26/400/10/70%  755/34/81   5/22/2022 5a vent 90% 744/41/117   5/21/2022 8p 100% vent 739/50/48    PMH/PSH PROBLEMS.  Management continued/modified as indicated      VENT MANAGEMENT.   HOB elevation  Target Pplat 30 (-)  Target PO 90-95%  Target pH 730 (+)  Daily spontaneous breathing trials   Daily sedation vacation         DIARRHEA.  5/23/2022 c diff (-)     PICC poa   5/23/2022 Ordered to dc picc    INFECTION SOURCE DD.   INFECTION A/R.   Has ho crp   Has ho iv abio within last 90 d  Is on bsab   id eval Dr BRITTANY Brantley appreciated   Started on ceftazidime avibactam 1.25x2 5/22/2022  completd 5/30/2022         INFECTION AUGUST.  W 5/21-5/22-5/23-5/24-5/25-5/26-5/28-5/29/2022  w 20 - 13 - 13-14 - 11-11.2 - 9.4 - 11.8  pr 5/29/2022 .67   ua 5/21/2022 w 3-5   ct ch 5/21/2022 new bl ggo patchy cpnsolidatnand septal thickening of uncertain etiology   No change bl effsns l greater   Near complete consolidatn both lower loobes       MICROBIO.  Rvp 5/21/2022 (-)   urine c 5/21 (-)   blod c 5/21 (-)   sputum 5/23 numerous pseudomonas   c diff 5/23/2022 c diff (-)   mrsa 5/24 (-)   legn 5/26 (-)   PAST MICROBIO.  4/24/2022 sputum CRABAPENEM RESISTANT PSEUDOMONAS  ABIO.  5/22/2022 ceftazidime avibactam 1.25x2   completd    SEVERE HYPOXIC RESP FAILURE poa.   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  V duplx 5/23/2022 (-)     COPD.  5/22/2022 duoneb     PLEURAL EFFSNS.  echo 9/8/2021   n lvsf  mild dd  n rvsf  rvsp 51   ct 4/22/2022 ct ch 4/21/2022   bl effsns increased in size cw 1/2022 5/25/2022 1.5 l clear pl effsn removed left side IR  5/25/2022 g 183 l 144/381 .37 p 3.4/5.3 .64 p 23 l 36   5/25/2022l pl fluid  lymph pred exudate   cyto (-)         ANEMIA.   Hb 5/21-5/22-5/23-5/24-5/25-5/26-5/27-5/28-5/29-5/30/2022   Hb 8 - 7.2 - 6.8 - 8 - 7.8-7.5 - 8.4 - 8.3 - 8.4-7.7    monitor  target hb 7 (+)     TRANSFUSION.  5/23/2022 1 u prbc    RYAN.  Na 5/21/2022 Na 140  CO2 5/21/2022 CO2 30   Cr 5/21-5/22-5/23-5/24-5/25-5/26-5/27-5/28/2022   Cr 1.6 - 1.5- 1.4 - 1.4-1.4 - 1.2 - 1.3 - 1.2    monitor     ELEVATED LFTS.  LFTS 5/22-5/23-5/24-5/26/2022  - 136-124-120   - 104-47-23   - 149 - 105-65   5/22/2022 us abd fibrofatty liver dis borderline gbw thick tr perichole fluid   5/22/2022  hep panel (-)       TIME SPENT   Over 36  minutes aggregate critical care time spent on encounter; activities included   direct patient care, counseling and/or coordinating care reviewing notes, lab data/ imaging , discussion with multidisciplinary team/ patient  /family and explaining in detail risks, benefits, alternatives  of the recommendations     CHAPINCITO GUIDRY 76 f [facility] S 5/21/2022

## 2022-05-30 NOTE — PROGRESS NOTE ADULT - ASSESSMENT
78 y/o F with PMH of HTN, DM type 2, Cardiac Arrest, CVA, COPD, Chronic Respiratory Failure Vent Dependant s/p trach & PEG, Multiple Hospital Admissions for Aspiration & vent associated PNA, COVID-19 Infection, Anemia with GI blood loss, GERD, and Advanced Dementia presented with acute respiratory distress.    ct chest reviewed  vs noted  labs reviewed  Ativan titration and dosing in progress    HCP Marciano -  - pt is full code  s/p emp ABX - broad spectrum for poss PNA  cvs rx regimen  bronchodilators  oral and skin care  assist with needs - ADL  monitor VS and HD  CT imaging reviewed  Old records reviewed  suction PRN  trach care  peg care  nutritional optimization  decubitus prevention

## 2022-05-30 NOTE — PROGRESS NOTE ADULT - SUBJECTIVE AND OBJECTIVE BOX
Date/Time Patient Seen:  		  Referring MD:   Data Reviewed	       Patient is a 76y old  Female who presents with a chief complaint of Acute respiratory distress. (29 May 2022 16:31)      Subjective/HPI     PAST MEDICAL & SURGICAL HISTORY:  Dementia of frontal lobe type    Aphasic stroke    Diabetes mellitus    Respiratory failure    Hypertension    GERD (gastroesophageal reflux disease)    Constipation    Respiratory failure    CVA (cerebral vascular accident)    HTN (hypertension)    DM (diabetes mellitus)    Advanced dementia    COVID-19 virus detected    Quadriplegia    Pneumonia    Type II diabetes mellitus    Hx of appendectomy    Gastrostomy in place    Tracheostomy in place    Tracheostomy tube present    Feeding by G-tube          Medication list         MEDICATIONS  (STANDING):  chlorhexidine 0.12% Liquid 15 milliLiter(s) Oral Mucosa every 12 hours  chlorhexidine 2% Cloths 1 Application(s) Topical <User Schedule>  chlorhexidine 4% Liquid 1 Application(s) Topical <User Schedule>  collagenase Ointment 1 Application(s) Topical daily  dextrose 5%. 1000 milliLiter(s) (50 mL/Hr) IV Continuous <Continuous>  dextrose 5%. 1000 milliLiter(s) (100 mL/Hr) IV Continuous <Continuous>  dextrose 50% Injectable 25 Gram(s) IV Push once  dextrose 50% Injectable 12.5 Gram(s) IV Push once  dextrose 50% Injectable 25 Gram(s) IV Push once  folic acid 1 milliGRAM(s) Oral daily  glucagon  Injectable 1 milliGRAM(s) IntraMuscular once  heparin   Injectable 5000 Unit(s) SubCutaneous every 8 hours  insulin glargine Injectable (LANTUS) 5 Unit(s) SubCutaneous every morning  insulin lispro (ADMELOG) corrective regimen sliding scale   SubCutaneous every 6 hours  lactobacillus acidophilus 1 Tablet(s) Oral daily  LORazepam     Tablet 1 milliGRAM(s) Oral every 4 hours  mineral oil/petrolatum Hydrophilic Ointment 1 Application(s) Topical daily  multivitamin 1 Tablet(s) Oral daily  nystatin Powder 1 Application(s) Topical three times a day  pantoprazole  Injectable 40 milliGRAM(s) IV Push daily  povidone iodine 10% Solution 1 Application(s) Topical daily    MEDICATIONS  (PRN):  acetaminophen    Suspension .. 650 milliGRAM(s) Oral every 6 hours PRN Temp greater or equal to 38C (100.4F), Mild Pain (1 - 3)  albuterol/ipratropium for Nebulization 3 milliLiter(s) Nebulizer every 6 hours PRN Shortness of Breath and/or Wheezing  dextrose Oral Gel 15 Gram(s) Oral once PRN Blood Glucose LESS THAN 70 milliGRAM(s)/deciliter  LORazepam   Injectable 1 milliGRAM(s) IV Push every 6 hours PRN agitation/vent dyssynchrony  sodium chloride 0.9% lock flush 10 milliLiter(s) IV Push every 1 hour PRN Pre/post blood products, medications, blood draw, and to maintain line patency         Vitals log        ICU Vital Signs Last 24 Hrs  T(C): 36.6 (30 May 2022 04:10), Max: 37.7 (29 May 2022 12:10)  T(F): 97.8 (30 May 2022 04:10), Max: 99.9 (29 May 2022 12:10)  HR: 75 (30 May 2022 06:00) (64 - 105)  BP: 108/62 (30 May 2022 06:00) (91/47 - 133/61)  BP(mean): 77 (30 May 2022 06:00) (61 - 83)  ABP: --  ABP(mean): --  RR: 25 (30 May 2022 06:00) (14 - 30)  SpO2: 100% (30 May 2022 06:00) (93% - 100%)       Mode: AC/ CMV (Assist Control/ Continuous Mandatory Ventilation)  RR (machine): 26  TV (machine): 400  FiO2: 40  PEEP: 5  ITime: 0.9  MAP: 17  PIP: 33      Input and Output:  I&O's Detail    29 May 2022 07:01  -  30 May 2022 07:00  --------------------------------------------------------  IN:    Glucerna 1.5: 1320 mL  Total IN: 1320 mL    OUT:    Indwelling Catheter - Urethral (mL): 1 mL  Total OUT: 1 mL    Total NET: 1319 mL          Lab Data                        7.5    9.68  )-----------( 198      ( 30 May 2022 06:28 )             25.8     05-30    145  |  112<H>  |  28<H>  ----------------------------<  183<H>  4.9   |  26  |  1.21    Ca    8.4      30 May 2022 06:28  Phos  2.9     05-29  Mg     1.6     05-29    TPro  6.7  /  Alb  1.8<L>  /  TBili  0.4  /  DBili  x   /  AST  12  /  ALT  27  /  AlkPhos  150<H>  05-29            Review of Systems	      Objective     Physical Examination    heart s1s2  lung dec BS  abd soft      Pertinent Lab findings & Imaging      Annalise:  NO   Adequate UO     I&O's Detail    29 May 2022 07:01  -  30 May 2022 07:00  --------------------------------------------------------  IN:    Glucerna 1.5: 1320 mL  Total IN: 1320 mL    OUT:    Indwelling Catheter - Urethral (mL): 1 mL  Total OUT: 1 mL    Total NET: 1319 mL               Discussed with:     Cultures:	        Radiology

## 2022-05-30 NOTE — CHART NOTE - NSCHARTNOTESELECT_GEN_ALL_CORE
Nutrition Services
EICU Review/Event Note
Event Note
Midline Removal/Event Note
Nutrition Services
Nutrition Services

## 2022-05-31 LAB
ANION GAP SERPL CALC-SCNC: 4 MMOL/L — LOW (ref 5–17)
BASOPHILS # BLD AUTO: 0.02 K/UL — SIGNIFICANT CHANGE UP (ref 0–0.2)
BASOPHILS NFR BLD AUTO: 0.3 % — SIGNIFICANT CHANGE UP (ref 0–2)
BUN SERPL-MCNC: 32 MG/DL — HIGH (ref 7–23)
CALCIUM SERPL-MCNC: 8.4 MG/DL — SIGNIFICANT CHANGE UP (ref 8.4–10.5)
CHLORIDE SERPL-SCNC: 110 MMOL/L — HIGH (ref 96–108)
CO2 SERPL-SCNC: 26 MMOL/L — SIGNIFICANT CHANGE UP (ref 22–31)
CREAT SERPL-MCNC: 1.07 MG/DL — SIGNIFICANT CHANGE UP (ref 0.5–1.3)
EGFR: 53 ML/MIN/1.73M2 — LOW
EOSINOPHIL # BLD AUTO: 0.15 K/UL — SIGNIFICANT CHANGE UP (ref 0–0.5)
EOSINOPHIL NFR BLD AUTO: 2 % — SIGNIFICANT CHANGE UP (ref 0–6)
GLUCOSE SERPL-MCNC: 209 MG/DL — HIGH (ref 70–99)
HCT VFR BLD CALC: 25.8 % — LOW (ref 34.5–45)
HGB BLD-MCNC: 7.6 G/DL — LOW (ref 11.5–15.5)
IMM GRANULOCYTES NFR BLD AUTO: 0.7 % — SIGNIFICANT CHANGE UP (ref 0–1.5)
LYMPHOCYTES # BLD AUTO: 0.76 K/UL — LOW (ref 1–3.3)
LYMPHOCYTES # BLD AUTO: 10.1 % — LOW (ref 13–44)
MAGNESIUM SERPL-MCNC: 2.4 MG/DL — SIGNIFICANT CHANGE UP (ref 1.6–2.6)
MCHC RBC-ENTMCNC: 26 PG — LOW (ref 27–34)
MCHC RBC-ENTMCNC: 29.5 GM/DL — LOW (ref 32–36)
MCV RBC AUTO: 88.4 FL — SIGNIFICANT CHANGE UP (ref 80–100)
MONOCYTES # BLD AUTO: 0.73 K/UL — SIGNIFICANT CHANGE UP (ref 0–0.9)
MONOCYTES NFR BLD AUTO: 9.7 % — SIGNIFICANT CHANGE UP (ref 2–14)
NEUTROPHILS # BLD AUTO: 5.79 K/UL — SIGNIFICANT CHANGE UP (ref 1.8–7.4)
NEUTROPHILS NFR BLD AUTO: 77.2 % — HIGH (ref 43–77)
NRBC # BLD: 0 /100 WBCS — SIGNIFICANT CHANGE UP (ref 0–0)
PHOSPHATE SERPL-MCNC: 2.7 MG/DL — SIGNIFICANT CHANGE UP (ref 2.5–4.5)
PLATELET # BLD AUTO: 187 K/UL — SIGNIFICANT CHANGE UP (ref 150–400)
POTASSIUM SERPL-MCNC: 5 MMOL/L — SIGNIFICANT CHANGE UP (ref 3.5–5.3)
POTASSIUM SERPL-SCNC: 5 MMOL/L — SIGNIFICANT CHANGE UP (ref 3.5–5.3)
RBC # BLD: 2.92 M/UL — LOW (ref 3.8–5.2)
RBC # FLD: 21.4 % — HIGH (ref 10.3–14.5)
SODIUM SERPL-SCNC: 140 MMOL/L — SIGNIFICANT CHANGE UP (ref 135–145)
WBC # BLD: 7.5 K/UL — SIGNIFICANT CHANGE UP (ref 3.8–10.5)
WBC # FLD AUTO: 7.5 K/UL — SIGNIFICANT CHANGE UP (ref 3.8–10.5)

## 2022-05-31 PROCEDURE — 99233 SBSQ HOSP IP/OBS HIGH 50: CPT

## 2022-05-31 PROCEDURE — 71045 X-RAY EXAM CHEST 1 VIEW: CPT | Mod: 26

## 2022-05-31 RX ORDER — FUROSEMIDE 40 MG
20 TABLET ORAL ONCE
Refills: 0 | Status: COMPLETED | OUTPATIENT
Start: 2022-05-31 | End: 2022-05-31

## 2022-05-31 RX ADMIN — HEPARIN SODIUM 5000 UNIT(S): 5000 INJECTION INTRAVENOUS; SUBCUTANEOUS at 05:51

## 2022-05-31 RX ADMIN — HEPARIN SODIUM 5000 UNIT(S): 5000 INJECTION INTRAVENOUS; SUBCUTANEOUS at 21:14

## 2022-05-31 RX ADMIN — Medication 20 MILLIGRAM(S): at 20:22

## 2022-05-31 RX ADMIN — Medication 1 MILLIGRAM(S): at 15:21

## 2022-05-31 RX ADMIN — CHLORHEXIDINE GLUCONATE 1 APPLICATION(S): 213 SOLUTION TOPICAL at 06:04

## 2022-05-31 RX ADMIN — NYSTATIN CREAM 1 APPLICATION(S): 100000 CREAM TOPICAL at 05:52

## 2022-05-31 RX ADMIN — Medication 4: at 11:50

## 2022-05-31 RX ADMIN — Medication 1 APPLICATION(S): at 11:35

## 2022-05-31 RX ADMIN — Medication 1 TABLET(S): at 11:34

## 2022-05-31 RX ADMIN — NYSTATIN CREAM 1 APPLICATION(S): 100000 CREAM TOPICAL at 14:02

## 2022-05-31 RX ADMIN — Medication 1 APPLICATION(S): at 11:36

## 2022-05-31 RX ADMIN — INSULIN GLARGINE 5 UNIT(S): 100 INJECTION, SOLUTION SUBCUTANEOUS at 08:02

## 2022-05-31 RX ADMIN — Medication 1 MILLIGRAM(S): at 17:42

## 2022-05-31 RX ADMIN — NYSTATIN CREAM 1 APPLICATION(S): 100000 CREAM TOPICAL at 21:13

## 2022-05-31 RX ADMIN — HEPARIN SODIUM 5000 UNIT(S): 5000 INJECTION INTRAVENOUS; SUBCUTANEOUS at 14:01

## 2022-05-31 RX ADMIN — CHLORHEXIDINE GLUCONATE 15 MILLILITER(S): 213 SOLUTION TOPICAL at 17:43

## 2022-05-31 RX ADMIN — Medication 1 MILLIGRAM(S): at 05:48

## 2022-05-31 RX ADMIN — CHLORHEXIDINE GLUCONATE 15 MILLILITER(S): 213 SOLUTION TOPICAL at 06:04

## 2022-05-31 RX ADMIN — Medication 1 MILLIGRAM(S): at 05:52

## 2022-05-31 RX ADMIN — Medication 1 MILLIGRAM(S): at 11:35

## 2022-05-31 RX ADMIN — Medication 1 TABLET(S): at 11:44

## 2022-05-31 RX ADMIN — PANTOPRAZOLE SODIUM 40 MILLIGRAM(S): 20 TABLET, DELAYED RELEASE ORAL at 11:34

## 2022-05-31 RX ADMIN — Medication 2: at 17:53

## 2022-05-31 RX ADMIN — Medication 1 MILLIGRAM(S): at 02:27

## 2022-05-31 RX ADMIN — Medication 1 MILLIGRAM(S): at 21:13

## 2022-05-31 RX ADMIN — Medication 1 MILLIGRAM(S): at 09:43

## 2022-05-31 RX ADMIN — Medication 1 APPLICATION(S): at 11:37

## 2022-05-31 RX ADMIN — Medication 1 MILLIGRAM(S): at 14:01

## 2022-05-31 RX ADMIN — Medication 4: at 05:51

## 2022-05-31 NOTE — PROGRESS NOTE ADULT - SUBJECTIVE AND OBJECTIVE BOX
Chief Complaint: Respiratory distress    Interval Events: No events overnight.    Review of Systems:  Unable to obtain    Physical Exam:  Vital Signs Last 24 Hrs  T(C): 37.5 (31 May 2022 08:31), Max: 37.5 (31 May 2022 08:31)  T(F): 99.5 (31 May 2022 08:31), Max: 99.5 (31 May 2022 08:31)  HR: 80 (31 May 2022 08:08) (63 - 85)  BP: 106/57 (31 May 2022 06:00) (92/55 - 122/60)  BP(mean): 72 (31 May 2022 06:00) (66 - 85)  RR: 26 (31 May 2022 06:00) (23 - 26)  SpO2: 95% (31 May 2022 08:08) (95% - 100%)  General: NAD  HEENT: Trach  Neck: No JVD, no carotid bruit  Lungs: Coarse bilaterally  CV: RRR, nl S1/S2, no M/R/G  Abdomen: S/NT/ND, +BS  Extremities: No LE edema, no cyanosis  Neuro: AAOx0  Skin: No rash    Labs:    05-31    140  |  110<H>  |  32<H>  ----------------------------<  209<H>  5.0   |  26  |  1.07    Ca    8.4      31 May 2022 06:19  Phos  2.7     05-31  Mg     2.4     05-31                          7.6    7.50  )-----------( 187      ( 31 May 2022 06:19 )             25.8     Telemetry: Sinus rhythm

## 2022-05-31 NOTE — PROGRESS NOTE ADULT - ASSESSMENT
79F HTN, DM2, COPD, Chronic Respiratory Failure on Trach to Vent admitted for Acute on Chronic Respiratory Failure.     Acute on Chronic Respiratory Failure / Septic Shock   Aspiration vs. VAP vs ; History of CRE and Psuedomonas; Remains Afebrile;   S/P IR Thoracentesis (L) 1500cc and will follow up cultures; MRSA negative  Completed course of abx   ID and Pulmonary consults appreciated   Discussed with pulm and cardio, fluid studies suggestive of exudate but may be from ? CHF  Monitor fluid status   TTE- Normal LV, dilated RV, mod pulm htn, small pericardial effusion    Anemia of Chronic Disease with Iron Deficiency  Has been evaluated by GI and Hematology on prior admissions  She has Anemia of Chronic Disease but could also have intermittent GI Bleeding; Occult Blood Negative  S/P 1U PRBC Transfusion and IV Venofer  Multivitamin daily    Acute Renal Failure on CKD 3  Baseline Cr around 1.2  Improving - Most likely due to Sepsis   Will continue gentle hydration and maintain BP   Renally dose and monitor BMP and electrolytes     HTN  Hold all BP lower agents    DM2  FS and on ISS to maintain BS <180    Diet  Tube Feeds    DVT Prophylaxis  Heparin    Disposition  Full Code     Discharge planning pending arrangements with [facility]

## 2022-05-31 NOTE — PROGRESS NOTE ADULT - SUBJECTIVE AND OBJECTIVE BOX
ICU Progress Note    HPI:    S:    Pt seen and examined  HD #  Pt here for      Allergies    codeine (Hives)    Intolerances        MEDICATIONS  (STANDING):  chlorhexidine 0.12% Liquid 15 milliLiter(s) Oral Mucosa every 12 hours  chlorhexidine 2% Cloths 1 Application(s) Topical <User Schedule>  chlorhexidine 4% Liquid 1 Application(s) Topical <User Schedule>  collagenase Ointment 1 Application(s) Topical daily  dextrose 5%. 1000 milliLiter(s) (50 mL/Hr) IV Continuous <Continuous>  dextrose 5%. 1000 milliLiter(s) (100 mL/Hr) IV Continuous <Continuous>  dextrose 50% Injectable 25 Gram(s) IV Push once  dextrose 50% Injectable 12.5 Gram(s) IV Push once  dextrose 50% Injectable 25 Gram(s) IV Push once  folic acid 1 milliGRAM(s) Oral daily  glucagon  Injectable 1 milliGRAM(s) IntraMuscular once  heparin   Injectable 5000 Unit(s) SubCutaneous every 8 hours  insulin glargine Injectable (LANTUS) 5 Unit(s) SubCutaneous every morning  insulin lispro (ADMELOG) corrective regimen sliding scale   SubCutaneous every 6 hours  lactobacillus acidophilus 1 Tablet(s) Oral daily  LORazepam     Tablet 1 milliGRAM(s) Oral every 4 hours  mineral oil/petrolatum Hydrophilic Ointment 1 Application(s) Topical daily  multivitamin 1 Tablet(s) Oral daily  nystatin Powder 1 Application(s) Topical three times a day  pantoprazole  Injectable 40 milliGRAM(s) IV Push daily  povidone iodine 10% Solution 1 Application(s) Topical daily    MEDICATIONS  (PRN):  acetaminophen    Suspension .. 650 milliGRAM(s) Oral every 6 hours PRN Temp greater or equal to 38C (100.4F), Mild Pain (1 - 3)  albuterol/ipratropium for Nebulization 3 milliLiter(s) Nebulizer every 6 hours PRN Shortness of Breath and/or Wheezing  dextrose Oral Gel 15 Gram(s) Oral once PRN Blood Glucose LESS THAN 70 milliGRAM(s)/deciliter  LORazepam   Injectable 1 milliGRAM(s) IV Push every 6 hours PRN agitation/vent dyssynchrony  sodium chloride 0.9% lock flush 10 milliLiter(s) IV Push every 1 hour PRN Pre/post blood products, medications, blood draw, and to maintain line patency      Drug Dosing Weight  Height (cm): 165.1 (21 May 2022 19:59)  Weight (kg): 53.5 (21 May 2022 20:03)  BMI (kg/m2): 19.6 (21 May 2022 20:03)  BSA (m2): 1.58 (21 May 2022 20:03)    PAST MEDICAL & SURGICAL HISTORY:  Dementia of frontal lobe type      Aphasic stroke      Diabetes mellitus      Respiratory failure      Hypertension      GERD (gastroesophageal reflux disease)      Constipation      Respiratory failure      CVA (cerebral vascular accident)      HTN (hypertension)      DM (diabetes mellitus)      Advanced dementia      COVID-19 virus detected      Quadriplegia      Pneumonia      Type II diabetes mellitus      Hx of appendectomy      Gastrostomy in place      Tracheostomy in place      Tracheostomy tube present      Feeding by G-tube          FAMILY HISTORY:  No pertinent family history in first degree relatives        ROS: See HPI; otherwise, all systems reviewed and negative.    O:    ICU Vital Signs Last 24 Hrs  T(C): 37.4 (31 May 2022 22:00), Max: 37.7 (31 May 2022 10:03)  T(F): 99.4 (31 May 2022 22:00), Max: 99.9 (31 May 2022 10:03)  HR: 75 (31 May 2022 22:00) (63 - 88)  BP: 114/57 (31 May 2022 22:00) (89/55 - 180/73)  BP(mean): 73 (31 May 2022 22:00) (66 - 102)  ABP: --  ABP(mean): --  RR: 21 (31 May 2022 22:00) (21 - 30)  SpO2: 97% (31 May 2022 22:00) (95% - 100%)          I&O's Detail    30 May 2022 07:01  -  31 May 2022 07:00  --------------------------------------------------------  IN:    Free Water: 350 mL    Glucerna 1.5: 1380 mL  Total IN: 1730 mL    OUT:    Voided (mL): 650 mL  Total OUT: 650 mL    Total NET: 1080 mL      31 May 2022 07:01  -  31 May 2022 23:28  --------------------------------------------------------  IN:    Free Water: 400 mL    Glucerna 1.5: 960 mL  Total IN: 1360 mL    OUT:    Voided (mL): 600 mL  Total OUT: 600 mL    Total NET: 760 mL          Mode: AC/ CMV (Assist Control/ Continuous Mandatory Ventilation)  RR (machine): 26  TV (machine): 400  FiO2: 35  PEEP: 5  ITime: 0.9  MAP: 16  PIP: 37      PE:    Constitutional: Healthy M lying in bed in NAD.   Neck: No JVD, trachea midline.   Respiratory: CTA B/L good BS B/L no W/R/R.  Cardiovascular: S1S2+ RRR no M/R/G.  Gastrointestinal: Soft, NTND.  Extremities: No peripheral edema, No cyanosis, clubbing.  Neurological: Awake, conversive, alert, no gross deficits.  Skin: No rashes, warm, moist.    LABS:    CBC Full  -  ( 31 May 2022 06:19 )  WBC Count : 7.50 K/uL  RBC Count : 2.92 M/uL  Hemoglobin : 7.6 g/dL  Hematocrit : 25.8 %  Platelet Count - Automated : 187 K/uL  Mean Cell Volume : 88.4 fl  Mean Cell Hemoglobin : 26.0 pg  Mean Cell Hemoglobin Concentration : 29.5 gm/dL  Auto Neutrophil # : 5.79 K/uL  Auto Lymphocyte # : 0.76 K/uL  Auto Monocyte # : 0.73 K/uL  Auto Eosinophil # : 0.15 K/uL  Auto Basophil # : 0.02 K/uL  Auto Neutrophil % : 77.2 %  Auto Lymphocyte % : 10.1 %  Auto Monocyte % : 9.7 %  Auto Eosinophil % : 2.0 %  Auto Basophil % : 0.3 %    05-31    140  |  110<H>  |  32<H>  ----------------------------<  209<H>  5.0   |  26  |  1.07    Ca    8.4      31 May 2022 06:19  Phos  2.7     05-31  Mg     2.4     05-31          CAPILLARY BLOOD GLUCOSE      POCT Blood Glucose.: 177 mg/dL (31 May 2022 23:17)  POCT Blood Glucose.: 157 mg/dL (31 May 2022 17:44)  POCT Blood Glucose.: 202 mg/dL (31 May 2022 11:39)  POCT Blood Glucose.: 205 mg/dL (31 May 2022 05:50)              A:    76yFemale  HD #    Here for:    1.    P:    Neuro: GCS 15. Monitor for delirium.  Continue to optimize pain control. Serial Neurologic assessments.    HEENT: No issues.    CV: Continue hemodynamic monitoring    Pulm: Pulmonary toilet.  Continue incentive spirometer.  Chest PT.  Encourage OOB to chair and ambulation. Nebs. f/u ABG, CXR.    GI/Nutrition: Cont diet, bowel regimen.    /Renal: Monitor UOP. Monitor BMP.  Replete Lytes as needed.    HEME- Chemical and mechanical DVT ppx. f/u CBC. f/u coags as needed.    ID:  Cont abx. f/u Cx's.    Lines/Tubes:     Endo: Maintain euglycemia.    Skin:  Cont skin care, pressure ulcer prevention.    Dispo: Cont care.       ICU Progress Note    S: Remains on full vent support. ABX completed. DC planning?    Pt seen and examined  HD #  Pt here for      Allergies    codeine (Hives)    Intolerances        MEDICATIONS  (STANDING):  chlorhexidine 0.12% Liquid 15 milliLiter(s) Oral Mucosa every 12 hours  chlorhexidine 2% Cloths 1 Application(s) Topical <User Schedule>  chlorhexidine 4% Liquid 1 Application(s) Topical <User Schedule>  collagenase Ointment 1 Application(s) Topical daily  dextrose 5%. 1000 milliLiter(s) (50 mL/Hr) IV Continuous <Continuous>  dextrose 5%. 1000 milliLiter(s) (100 mL/Hr) IV Continuous <Continuous>  dextrose 50% Injectable 25 Gram(s) IV Push once  dextrose 50% Injectable 12.5 Gram(s) IV Push once  dextrose 50% Injectable 25 Gram(s) IV Push once  folic acid 1 milliGRAM(s) Oral daily  glucagon  Injectable 1 milliGRAM(s) IntraMuscular once  heparin   Injectable 5000 Unit(s) SubCutaneous every 8 hours  insulin glargine Injectable (LANTUS) 5 Unit(s) SubCutaneous every morning  insulin lispro (ADMELOG) corrective regimen sliding scale   SubCutaneous every 6 hours  lactobacillus acidophilus 1 Tablet(s) Oral daily  LORazepam     Tablet 1 milliGRAM(s) Oral every 4 hours  mineral oil/petrolatum Hydrophilic Ointment 1 Application(s) Topical daily  multivitamin 1 Tablet(s) Oral daily  nystatin Powder 1 Application(s) Topical three times a day  pantoprazole  Injectable 40 milliGRAM(s) IV Push daily  povidone iodine 10% Solution 1 Application(s) Topical daily    MEDICATIONS  (PRN):  acetaminophen    Suspension .. 650 milliGRAM(s) Oral every 6 hours PRN Temp greater or equal to 38C (100.4F), Mild Pain (1 - 3)  albuterol/ipratropium for Nebulization 3 milliLiter(s) Nebulizer every 6 hours PRN Shortness of Breath and/or Wheezing  dextrose Oral Gel 15 Gram(s) Oral once PRN Blood Glucose LESS THAN 70 milliGRAM(s)/deciliter  LORazepam   Injectable 1 milliGRAM(s) IV Push every 6 hours PRN agitation/vent dyssynchrony  sodium chloride 0.9% lock flush 10 milliLiter(s) IV Push every 1 hour PRN Pre/post blood products, medications, blood draw, and to maintain line patency      Drug Dosing Weight  Height (cm): 165.1 (21 May 2022 19:59)  Weight (kg): 53.5 (21 May 2022 20:03)  BMI (kg/m2): 19.6 (21 May 2022 20:03)  BSA (m2): 1.58 (21 May 2022 20:03)    PAST MEDICAL & SURGICAL HISTORY:  Dementia of frontal lobe type      Aphasic stroke      Diabetes mellitus      Respiratory failure      Hypertension      GERD (gastroesophageal reflux disease)      Constipation      Respiratory failure      CVA (cerebral vascular accident)      HTN (hypertension)      DM (diabetes mellitus)      Advanced dementia      COVID-19 virus detected      Quadriplegia      Pneumonia      Type II diabetes mellitus      Hx of appendectomy      Gastrostomy in place      Tracheostomy in place      Tracheostomy tube present      Feeding by G-tube          FAMILY HISTORY:  No pertinent family history in first degree relatives        ROS: See HPI; otherwise, all systems reviewed and negative.    O:    ICU Vital Signs Last 24 Hrs  T(C): 37.4 (31 May 2022 22:00), Max: 37.7 (31 May 2022 10:03)  T(F): 99.4 (31 May 2022 22:00), Max: 99.9 (31 May 2022 10:03)  HR: 75 (31 May 2022 22:00) (63 - 88)  BP: 114/57 (31 May 2022 22:00) (89/55 - 180/73)  BP(mean): 73 (31 May 2022 22:00) (66 - 102)  ABP: --  ABP(mean): --  RR: 21 (31 May 2022 22:00) (21 - 30)  SpO2: 97% (31 May 2022 22:00) (95% - 100%)          I&O's Detail    30 May 2022 07:01  -  31 May 2022 07:00  --------------------------------------------------------  IN:    Free Water: 350 mL    Glucerna 1.5: 1380 mL  Total IN: 1730 mL    OUT:    Voided (mL): 650 mL  Total OUT: 650 mL    Total NET: 1080 mL      31 May 2022 07:01  -  31 May 2022 23:28  --------------------------------------------------------  IN:    Free Water: 400 mL    Glucerna 1.5: 960 mL  Total IN: 1360 mL    OUT:    Voided (mL): 600 mL  Total OUT: 600 mL    Total NET: 760 mL          Mode: AC/ CMV (Assist Control/ Continuous Mandatory Ventilation)  RR (machine): 26  TV (machine): 400  FiO2: 35  PEEP: 5  ITime: 0.9  MAP: 16  PIP: 37      PE:    Constitutional: Healthy M lying in bed in NAD.   Neck: No JVD, trachea midline.   Respiratory: CTA B/L good BS B/L no W/R/R.  Cardiovascular: S1S2+ RRR no M/R/G.  Gastrointestinal: Soft, NTND.  Extremities: No peripheral edema, No cyanosis, clubbing.  Neurological: Awake, conversive, alert, no gross deficits.  Skin: No rashes, warm, moist.    LABS:    CBC Full  -  ( 31 May 2022 06:19 )  WBC Count : 7.50 K/uL  RBC Count : 2.92 M/uL  Hemoglobin : 7.6 g/dL  Hematocrit : 25.8 %  Platelet Count - Automated : 187 K/uL  Mean Cell Volume : 88.4 fl  Mean Cell Hemoglobin : 26.0 pg  Mean Cell Hemoglobin Concentration : 29.5 gm/dL  Auto Neutrophil # : 5.79 K/uL  Auto Lymphocyte # : 0.76 K/uL  Auto Monocyte # : 0.73 K/uL  Auto Eosinophil # : 0.15 K/uL  Auto Basophil # : 0.02 K/uL  Auto Neutrophil % : 77.2 %  Auto Lymphocyte % : 10.1 %  Auto Monocyte % : 9.7 %  Auto Eosinophil % : 2.0 %  Auto Basophil % : 0.3 %    05-31    140  |  110<H>  |  32<H>  ----------------------------<  209<H>  5.0   |  26  |  1.07    Ca    8.4      31 May 2022 06:19  Phos  2.7     05-31  Mg     2.4     05-31          CAPILLARY BLOOD GLUCOSE      POCT Blood Glucose.: 177 mg/dL (31 May 2022 23:17)  POCT Blood Glucose.: 157 mg/dL (31 May 2022 17:44)  POCT Blood Glucose.: 202 mg/dL (31 May 2022 11:39)  POCT Blood Glucose.: 205 mg/dL (31 May 2022 05:50)              A:    76yFemale  HD #    Here for:    1. Acute on chronic hypoxic respiratory failure   2. Pleural effusion    Plan:   - On full vent support  - s/p Thoracentesis w/ removal of 1500cc on 5/25   - No abx for now  - tube feeds to goal  - Heparin SQ for DVT ppx   - Lantus and ISS for glucose control   - Ativan PRN for agitation  - Protonix for GI PPX

## 2022-05-31 NOTE — PROGRESS NOTE ADULT - SUBJECTIVE AND OBJECTIVE BOX
Date/Time Patient Seen:  		  Referring MD:   Data Reviewed	       Patient is a 76y old  Female who presents with a chief complaint of Acute respiratory distress. (30 May 2022 11:45)      Subjective/HPI     PAST MEDICAL & SURGICAL HISTORY:  Dementia of frontal lobe type    Aphasic stroke    Diabetes mellitus    Respiratory failure    Hypertension    GERD (gastroesophageal reflux disease)    Constipation    Respiratory failure    CVA (cerebral vascular accident)    HTN (hypertension)    DM (diabetes mellitus)    Advanced dementia    COVID-19 virus detected    Quadriplegia    Pneumonia    Type II diabetes mellitus    Hx of appendectomy    Gastrostomy in place    Tracheostomy in place    Tracheostomy tube present    Feeding by G-tube          Medication list         MEDICATIONS  (STANDING):  chlorhexidine 0.12% Liquid 15 milliLiter(s) Oral Mucosa every 12 hours  chlorhexidine 2% Cloths 1 Application(s) Topical <User Schedule>  chlorhexidine 4% Liquid 1 Application(s) Topical <User Schedule>  collagenase Ointment 1 Application(s) Topical daily  dextrose 5%. 1000 milliLiter(s) (50 mL/Hr) IV Continuous <Continuous>  dextrose 5%. 1000 milliLiter(s) (100 mL/Hr) IV Continuous <Continuous>  dextrose 50% Injectable 25 Gram(s) IV Push once  dextrose 50% Injectable 12.5 Gram(s) IV Push once  dextrose 50% Injectable 25 Gram(s) IV Push once  folic acid 1 milliGRAM(s) Oral daily  glucagon  Injectable 1 milliGRAM(s) IntraMuscular once  heparin   Injectable 5000 Unit(s) SubCutaneous every 8 hours  insulin glargine Injectable (LANTUS) 5 Unit(s) SubCutaneous every morning  insulin lispro (ADMELOG) corrective regimen sliding scale   SubCutaneous every 6 hours  lactobacillus acidophilus 1 Tablet(s) Oral daily  LORazepam     Tablet 1 milliGRAM(s) Oral every 4 hours  mineral oil/petrolatum Hydrophilic Ointment 1 Application(s) Topical daily  multivitamin 1 Tablet(s) Oral daily  nystatin Powder 1 Application(s) Topical three times a day  pantoprazole  Injectable 40 milliGRAM(s) IV Push daily  povidone iodine 10% Solution 1 Application(s) Topical daily    MEDICATIONS  (PRN):  acetaminophen    Suspension .. 650 milliGRAM(s) Oral every 6 hours PRN Temp greater or equal to 38C (100.4F), Mild Pain (1 - 3)  albuterol/ipratropium for Nebulization 3 milliLiter(s) Nebulizer every 6 hours PRN Shortness of Breath and/or Wheezing  dextrose Oral Gel 15 Gram(s) Oral once PRN Blood Glucose LESS THAN 70 milliGRAM(s)/deciliter  LORazepam   Injectable 1 milliGRAM(s) IV Push every 6 hours PRN agitation/vent dyssynchrony  sodium chloride 0.9% lock flush 10 milliLiter(s) IV Push every 1 hour PRN Pre/post blood products, medications, blood draw, and to maintain line patency         Vitals log        ICU Vital Signs Last 24 Hrs  T(C): 36.6 (31 May 2022 03:38), Max: 37.2 (30 May 2022 16:01)  T(F): 97.9 (31 May 2022 03:38), Max: 99 (30 May 2022 16:01)  HR: 72 (31 May 2022 06:00) (63 - 85)  BP: 106/57 (31 May 2022 06:00) (92/55 - 122/64)  BP(mean): 72 (31 May 2022 06:00) (66 - 85)  ABP: --  ABP(mean): --  RR: 26 (31 May 2022 06:00) (23 - 26)  SpO2: 100% (31 May 2022 06:00) (96% - 100%)       Mode: AC/ CMV (Assist Control/ Continuous Mandatory Ventilation)  RR (machine): 26  TV (machine): 400  FiO2: 35  PEEP: 5  ITime: 0.9  MAP: 15  PIP: 31      Input and Output:  I&O's Detail    29 May 2022 07:01  -  30 May 2022 07:00  --------------------------------------------------------  IN:    Glucerna 1.5: 1380 mL  Total IN: 1380 mL    OUT:    Indwelling Catheter - Urethral (mL): 1 mL  Total OUT: 1 mL    Total NET: 1379 mL      30 May 2022 07:01  -  31 May 2022 06:42  --------------------------------------------------------  IN:    Free Water: 75 mL    Glucerna 1.5: 720 mL  Total IN: 795 mL    OUT:    Voided (mL): 650 mL  Total OUT: 650 mL    Total NET: 145 mL          Lab Data                        7.6    7.50  )-----------( 187      ( 31 May 2022 06:19 )             25.8     05-30    145  |  112<H>  |  28<H>  ----------------------------<  183<H>  4.9   |  26  |  1.21    Ca    8.4      30 May 2022 06:28  Phos  2.9     05-29  Mg     1.6     05-29    TPro  6.7  /  Alb  1.8<L>  /  TBili  0.4  /  DBili  x   /  AST  12  /  ALT  27  /  AlkPhos  150<H>  05-29            Review of Systems	      Objective     Physical Examination    heart s1s2  lung dec BS  abd soft      Pertinent Lab findings & Imaging      Annalise:  NO   Adequate UO     I&O's Detail    29 May 2022 07:01  -  30 May 2022 07:00  --------------------------------------------------------  IN:    Glucerna 1.5: 1380 mL  Total IN: 1380 mL    OUT:    Indwelling Catheter - Urethral (mL): 1 mL  Total OUT: 1 mL    Total NET: 1379 mL      30 May 2022 07:01  -  31 May 2022 06:42  --------------------------------------------------------  IN:    Free Water: 75 mL    Glucerna 1.5: 720 mL  Total IN: 795 mL    OUT:    Voided (mL): 650 mL  Total OUT: 650 mL    Total NET: 145 mL               Discussed with:     Cultures:	        Radiology

## 2022-05-31 NOTE — PROGRESS NOTE ADULT - ASSESSMENT
This is an 78 y/o F with PMH of HTN, DM type 2, Cardiac Arrest, CVA, COPD, Chronic Respiratory Failure Vent Dependant s/p trach & PEG, Multiple Hospital Admissions for Aspiration & vent associated PNA, COVID-19 Infection, Anemia with GI blood loss, GERD, and Advanced Dementia who was sent from [facility] facility for acute respiratory distress.     Sepsis 2/2 VAP/PNA c/b pleural effusions  Pleural Effusions: parapneumonic vs. empyema  - prior sputum 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  - s/p IR guided thoracentesis on 5/25    --pleural fluid cx NGTD  - repeat 5/25 CT chest w/ improved aeration  Plan:  s/p avycaz  x7 day completed 5/28  S/p linezolid  C/w vent management per ICU  C/w monitoring off Abx    Infectious Diseases will continue to follow. Please call with any questions.   Andressa Brantley M.D.  [facility], Division of Infectious Diseases [phone]

## 2022-05-31 NOTE — PROGRESS NOTE ADULT - TIME BILLING
The total amount of time listed was spent reviewing the hospital notes, laboratory values, imaging findings, assessing/counseling the patient, discussing with consultant physicians, case management, social work, and nursing staff.

## 2022-05-31 NOTE — PROGRESS NOTE ADULT - SUBJECTIVE AND OBJECTIVE BOX
Patient is a 76y old  Female who presents with a chief complaint of Acute respiratory distress. (31 May 2022 06:42)      INTERVAL HPI/OVERNIGHT EVENTS: Patient seen and examined at bedside. No overnight events     MEDICATIONS  (STANDING):  chlorhexidine 0.12% Liquid 15 milliLiter(s) Oral Mucosa every 12 hours  chlorhexidine 2% Cloths 1 Application(s) Topical <User Schedule>  chlorhexidine 4% Liquid 1 Application(s) Topical <User Schedule>  collagenase Ointment 1 Application(s) Topical daily  dextrose 5%. 1000 milliLiter(s) (50 mL/Hr) IV Continuous <Continuous>  dextrose 5%. 1000 milliLiter(s) (100 mL/Hr) IV Continuous <Continuous>  dextrose 50% Injectable 25 Gram(s) IV Push once  dextrose 50% Injectable 12.5 Gram(s) IV Push once  dextrose 50% Injectable 25 Gram(s) IV Push once  folic acid 1 milliGRAM(s) Oral daily  glucagon  Injectable 1 milliGRAM(s) IntraMuscular once  heparin   Injectable 5000 Unit(s) SubCutaneous every 8 hours  insulin glargine Injectable (LANTUS) 5 Unit(s) SubCutaneous every morning  insulin lispro (ADMELOG) corrective regimen sliding scale   SubCutaneous every 6 hours  lactobacillus acidophilus 1 Tablet(s) Oral daily  LORazepam     Tablet 1 milliGRAM(s) Oral every 4 hours  mineral oil/petrolatum Hydrophilic Ointment 1 Application(s) Topical daily  multivitamin 1 Tablet(s) Oral daily  nystatin Powder 1 Application(s) Topical three times a day  pantoprazole  Injectable 40 milliGRAM(s) IV Push daily  povidone iodine 10% Solution 1 Application(s) Topical daily    MEDICATIONS  (PRN):  acetaminophen    Suspension .. 650 milliGRAM(s) Oral every 6 hours PRN Temp greater or equal to 38C (100.4F), Mild Pain (1 - 3)  albuterol/ipratropium for Nebulization 3 milliLiter(s) Nebulizer every 6 hours PRN Shortness of Breath and/or Wheezing  dextrose Oral Gel 15 Gram(s) Oral once PRN Blood Glucose LESS THAN 70 milliGRAM(s)/deciliter  LORazepam   Injectable 1 milliGRAM(s) IV Push every 6 hours PRN agitation/vent dyssynchrony  sodium chloride 0.9% lock flush 10 milliLiter(s) IV Push every 1 hour PRN Pre/post blood products, medications, blood draw, and to maintain line patency      Allergies    codeine (Hives)    Intolerances        REVIEW OF SYSTEMS:  Unable to obtain meaningful ROS due to mental status      Vital Signs Last 24 Hrs  T(C): 36.6 (31 May 2022 03:38), Max: 37.2 (30 May 2022 16:01)  T(F): 97.9 (31 May 2022 03:38), Max: 99 (30 May 2022 16:01)  HR: 72 (31 May 2022 06:00) (63 - 85)  BP: 106/57 (31 May 2022 06:00) (92/55 - 122/64)  BP(mean): 72 (31 May 2022 06:00) (66 - 85)  RR: 26 (31 May 2022 06:00) (23 - 26)  SpO2: 100% (31 May 2022 06:00) (96% - 100%)    PHYSICAL EXAM:  GENERAL: chronically ill appearing, emaciated, NAD, obtunded  HEAD:  atraumatic  EYES: conjunctiva clear  NECK: (+)trach in place, clean  RESPIRATORY: mechanical breath sounds, vent in place, no gross crackles or rales  CARDIOVASCULAR:  regular rate and rhythm, no murmurs or rubs or gallops, 2+ peripheral pulses  GASTROINTESTINAL:  soft, +PEG in place, clean and dry as well, nondistended, bowel sounds present  EXTREMITIES: no clubbing or cyanosis or edema  MUSCULOSKELETAL:  (+)diffuse contractures in all joints  NERVOUS SYSTEM: does not respond to pain      LABS:                        7.6    7.50  )-----------( 187      ( 31 May 2022 06:19 )             25.8     CBC Full  -  ( 31 May 2022 06:19 )  WBC Count : 7.50 K/uL  Hemoglobin : 7.6 g/dL  Hematocrit : 25.8 %  Platelet Count - Automated : 187 K/uL  Mean Cell Volume : 88.4 fl  Mean Cell Hemoglobin : 26.0 pg  Mean Cell Hemoglobin Concentration : 29.5 gm/dL  Auto Neutrophil # : 5.79 K/uL  Auto Lymphocyte # : 0.76 K/uL  Auto Monocyte # : 0.73 K/uL  Auto Eosinophil # : 0.15 K/uL  Auto Basophil # : 0.02 K/uL  Auto Neutrophil % : 77.2 %  Auto Lymphocyte % : 10.1 %  Auto Monocyte % : 9.7 %  Auto Eosinophil % : 2.0 %  Auto Basophil % : 0.3 %    31 May 2022 06:19    140    |  110    |  32     ----------------------------<  209    5.0     |  26     |  1.07     Ca    8.4        31 May 2022 06:19  Phos  2.7       31 May 2022 06:19  Mg     2.4       31 May 2022 06:19          CAPILLARY BLOOD GLUCOSE      POCT Blood Glucose.: 205 mg/dL (31 May 2022 05:50)  POCT Blood Glucose.: 190 mg/dL (30 May 2022 23:11)  POCT Blood Glucose.: 191 mg/dL (30 May 2022 17:14)  POCT Blood Glucose.: 187 mg/dL (30 May 2022 11:12)        Culture - Fungal, Body Fluid (collected 05-25-22 @ 10:39)  Source: .Body Fluid Pleural Fluid  Preliminary Report (05-26-22 @ 06:53):    Testing in progress    Culture - Body Fluid with Gram Stain (collected 05-25-22 @ 10:39)  Source: .Body Fluid Pleural Fluid  Gram Stain (05-25-22 @ 19:50):    polymorphonuclear leukocytes seen    No organisms seen    by cytocentrifuge  Final Report (05-30-22 @ 09:04):    No growth    Culture - Acid Fast - Body Fluid w/Smear (collected 05-25-22 @ 10:39)  Source: .Body Fluid Pleural Fluid  Preliminary Report (05-28-22 @ 15:04):    Culture is being performed.        RADIOLOGY & ADDITIONAL TESTS:     Consultant(s) Notes Reviewed:  [x] YES  [ ] NO    Care Discussed with [x] Consultants  [x] Patient  [ ] Family  [ ]      [ x] Other; RN  DVT ppx

## 2022-05-31 NOTE — PROGRESS NOTE ADULT - ASSESSMENT
REVIEW OF SYMPTOMS      Able to give (reliable) ROS  NO     PHYSICAL EXAM    HEENT Unremarkable  atraumatic   RESP Fair air entry EXP prolonged    Harsh breath sound Resp distres mild   CARDIAC S1 S2 No S3     NO JVD    ABDOMEN SOFT BS PRESENT NOT DISTENDED No hepatosplenomegaly   PEDAL EDEMA present No calf tenderness  NO rash       AGE/SEX.   76 f    DOA.  5/21/2022     CC .  5/21/2022 AOC RESP FAILURE     PMH .  pmh Hosp stay given zosyn 4/21  pmh Pneumonia    pmh HTN,   pmh DM,   pmh CVA,   pmh  chronic respiratory failure   pmh s/p trach, PEG,   pmh cardiac arrest        MAIN ISSUES  .    SEVERE HYPOXIC RESP  FAILURE poa   VAP poa   SHOCK poa   BL PL EFFSNS   5/25/2022 Thoracentesis  CHF  PULM HYTN   ANEMIA 5/23/2022 Hb 6.8   ELEVATED LFTS     COVID/ICU/CODE STATUS.                       COVID  STATUS. 5/21/2022 scv2 (-)           ICU STAY. 5/21  GOC.  5/24/2022 full code    BEST PRACTICE ISSUES.                                                  HEAD OF BED ELEVATION. Yes  DVT PROPHYLAXIS.     5/21/2022 hpsc    SQUIRES PROPHYLAXIS.5/22/2022 protonix 40   INFECTION PROPHYLAXIS.   5/21/2022 Ch;lorhexidine .12%   5/21/2022 Chlorhexidine 2%                                                                                         DIET.  5/22 glucerna 1.5 1200 gt      VITALS/PO/IO/VENT/DRIPS.   5/31/2022 99f 69 115/50   5/31/2022 ac 26/400/5/.35      PROBLEM DATA/ASSESSMENT RECOMMENDATIONS (A/R).    HEMODYNAMICS.   Monitor and optimize bp   Target MAP to miguel  65 (+)    RESP.   Monitor and optimize  po   Target po to remain 90-95%    ABG.  5/23/2022 5a ac 26/400/10/70%  755/34/81   5/22/2022 5a vent 90% 744/41/117   5/21/2022 8p 100% vent 739/50/48    PMH/PSH PROBLEMS.  Management continued/modified as indicated      VENT MANAGEMENT.   HOB elevation  Target Pplat 30 (-)  Target PO 90-95%  Target pH 730 (+)  Daily spontaneous breathing trials   Daily sedation vacation         DIARRHEA.  5/23/2022 c diff (-)     PICC poa   5/23/2022 Ordered to dc picc    INFECTION SOURCE DD.   INFECTION A/R.   Has ho crp   Has ho iv abio within last 90 d  Is on bsab   id eval Dr BRITTANY Brantley appreciated   Started on ceftazidime avibactam 1.25x2 5/22/2022  completd 5/30/2022           INFECTION AUGUST.  W 5/21-5/22-5/23-5/24-5/25-5/26-5/28-5/29-5/31/2022  w 20 - 13 - 13-14 - 11-11.2 - 9.4 - 11.8- 7.5   pr 5/29/2022 .67   ua 5/21/2022 w 3-5   ct ch 5/21/2022 new bl ggo patchy cpnsolidatnand septal thickening of uncertain etiology   No change bl effsns l greater   Near complete consolidatn both lower loobes     MICROBIO.  Rvp 5/21/2022 (-)   urine c 5/21 (-)   blod c 5/21 (-)   sputum 5/23 numerous pseudomonas   c diff 5/23/2022 c diff (-)   mrsa 5/24 (-)   legn 5/26 (-)   PAST MICROBIO.  4/24/2022 sputum CRABAPENEM RESISTANT PSEUDOMONAS  ABIO.  5/22/2022 ceftazidime avibactam 1.25x2   completd    RO VTE.  V duplx 5/23/2022 (-)     COPD.  5/22/2022 duoneb     PLEURAL EFFSNS.  echo 5/30/2022 ef 65% pasp 60 dialted rv   echo 9/8/2021   n lvsf  mild dd  n rvsf  rvsp 51   ct 4/22/2022 ct ch 4/21/2022   bl effsns increased in size cw 1/2022 5/25/2022 1.5 l clear pl effsn removed left side IR  5/25/2022 g 183 l 144/381 .37 p 3.4/5.3 .64 p 23 l 36   5/25/2022l pl fluid  lymph pred exudate   cyto (-)         ANEMIA.   Hb 5/21-5/22-5/23-5/24-5/25-5/26-5/27-5/28-5/29-5/30/2022   Hb 8 - 7.2 - 6.8 - 8 - 7.8-7.5 - 8.4 - 8.3 - 8.4-7.7    monitor  target hb 7 (+)     TRANSFUSION.  5/23/2022 1 u prbc    RYAN.  Na 5/21/2022 Na 140  CO2 5/21/2022 CO2 30   Cr 5/21-5/22-5/23-5/24-5/25-5/26-5/27-5/28/2022   Cr 1.6 - 1.5- 1.4 - 1.4-1.4 - 1.2 - 1.3 - 1.2    monitor     ELEVATED LFTS.  LFTS 5/22-5/23-5/24-5/26/2022  - 136-124-120   - 104-47-23   - 149 - 105-65   5/22/2022 us abd fibrofatty liver dis borderline gbw thick tr perichole fluid   5/22/2022  hep panel (-)       TIME SPENT   Over 36  minutes aggregate critical care time spent on encounter; activities included   direct patient care, counseling and/or coordinating care reviewing notes, lab data/ imaging , discussion with multidisciplinary team/ patient  /family and explaining in detail risks, benefits, alternatives  of the recommendations     CHAPINCITO GUIDRY 76 f [facility] S 5/21/2022

## 2022-05-31 NOTE — PROGRESS NOTE ADULT - SUBJECTIVE AND OBJECTIVE BOX
[facility], Division of Infectious Diseases  MOIZ Lora Y. Patel, S. Shah, G. [facility]  [phone]    Name: BREA BECKHAM  Age: 76y  Gender: Female  MRN: [id number]    Interval History:  Patient seen and examined  No acute overnight events.  Low grade temp to 99F noted  Notes reviewed    Antibiotics:      Medications:  acetaminophen    Suspension .. 650 milliGRAM(s) Oral every 6 hours PRN  albuterol/ipratropium for Nebulization 3 milliLiter(s) Nebulizer every 6 hours PRN  chlorhexidine 0.12% Liquid 15 milliLiter(s) Oral Mucosa every 12 hours  chlorhexidine 2% Cloths 1 Application(s) Topical <User Schedule>  chlorhexidine 4% Liquid 1 Application(s) Topical <User Schedule>  collagenase Ointment 1 Application(s) Topical daily  dextrose 5%. 1000 milliLiter(s) IV Continuous <Continuous>  dextrose 5%. 1000 milliLiter(s) IV Continuous <Continuous>  dextrose 50% Injectable 25 Gram(s) IV Push once  dextrose 50% Injectable 12.5 Gram(s) IV Push once  dextrose 50% Injectable 25 Gram(s) IV Push once  dextrose Oral Gel 15 Gram(s) Oral once PRN  folic acid 1 milliGRAM(s) Oral daily  glucagon  Injectable 1 milliGRAM(s) IntraMuscular once  heparin   Injectable 5000 Unit(s) SubCutaneous every 8 hours  insulin glargine Injectable (LANTUS) 5 Unit(s) SubCutaneous every morning  insulin lispro (ADMELOG) corrective regimen sliding scale   SubCutaneous every 6 hours  lactobacillus acidophilus 1 Tablet(s) Oral daily  LORazepam     Tablet 1 milliGRAM(s) Oral every 4 hours  LORazepam   Injectable 1 milliGRAM(s) IV Push every 6 hours PRN  mineral oil/petrolatum Hydrophilic Ointment 1 Application(s) Topical daily  multivitamin 1 Tablet(s) Oral daily  nystatin Powder 1 Application(s) Topical three times a day  pantoprazole  Injectable 40 milliGRAM(s) IV Push daily  povidone iodine 10% Solution 1 Application(s) Topical daily  sodium chloride 0.9% lock flush 10 milliLiter(s) IV Push every 1 hour PRN      Review of Systems:  unable to obtain    Allergies: codeine (Hives)    For details regarding the patient's past medical history, social history, family history, and other miscellaneous elements, please refer the initial infectious diseases consultation and/or the admitting history and physical examination for this admission.    Objective:  Vitals:   T(C): 37.7 (05-31-22 @ 10:03), Max: 37.7 (05-31-22 @ 10:03)  HR: 75 (05-31-22 @ 10:49) (63 - 88)  BP: 180/73 (05-31-22 @ 10:00) (92/55 - 180/73)  RR: 30 (05-31-22 @ 10:00) (23 - 30)  SpO2: 99% (05-31-22 @ 10:49) (95% - 100%)    Mode: AC/ CMV (Assist Control/ Continuous Mandatory Ventilation)  RR (machine): 26  TV (machine): 400  FiO2: 35  PEEP: 5  ITime: 0.9  MAP: 16  PIP: 30      Physical Examination:  General: no acute distress  HEENT: NC/AT  Neck: trach  Cardio: S1, S2 heard, RRR, no murmurs  Resp: MV breath sounds  Abd: soft, NT, N  Ext: no edema or cyanosis      Laboratory Studies:  CBC:                       7.6    7.50  )-----------( 187      ( 31 May 2022 06:19 )             25.8     CMP: 05-31    140  |  110<H>  |  32<H>  ----------------------------<  209<H>  5.0   |  26  |  1.07    Ca    8.4      31 May 2022 06:19  Phos  2.7     05-31  Mg     2.4     05-31            Microbiology: reviewed    Culture - Fungal, Body Fluid (collected 05-25-22 @ 10:39)  Source: .Body Fluid Pleural Fluid  Preliminary Report (05-26-22 @ 06:53):    Testing in progress    Culture - Body Fluid with Gram Stain (collected 05-25-22 @ 10:39)  Source: .Body Fluid Pleural Fluid  Gram Stain (05-25-22 @ 19:50):    polymorphonuclear leukocytes seen    No organisms seen    by cytocentrifuge  Final Report (05-30-22 @ 09:04):    No growth    Culture - Acid Fast - Body Fluid w/Smear (collected 05-25-22 @ 10:39)  Source: .Body Fluid Pleural Fluid  Preliminary Report (05-28-22 @ 15:04):    Culture is being performed.    Culture - Sputum (collected 05-23-22 @ 11:20)  Source: Trach Asp Tracheal Aspirate  Gram Stain (05-23-22 @ 21:29):    Moderate polymorphonuclear leukocytes per low power field    No Squamous epithelial cells per low power field    Moderate Gram Negative Rods per oil power field  Final Report (05-25-22 @ 16:10):    Numerous Mixed gram negative rods including    Numerous Pseudomonas aeruginosa (Carbapenem Resistant)    Normal Respiratory Elvira absent  Organism: Pseudomonas aeruginosa (Carbapenem Resistant) (05-25-22 @ 16:10)  Organism: Pseudomonas aeruginosa (Carbapenem Resistant) (05-25-22 @ 16:10)      -  Amikacin: S <=16      -  Aztreonam: S <=4      -  Cefepime: S 4      -  Ceftazidime: S 8      -  Ciprofloxacin: S <=0.25      -  Gentamicin: S <=2      -  Imipenem: R >8      -  Levofloxacin: S <=0.5      -  Meropenem: R 8      -  Piperacillin/Tazobactam: S 16      -  Tobramycin: S <=2      Method Type: PRATIK    Culture - Blood (collected 05-21-22 @ 21:57)  Source: .Blood Blood-Peripheral  Final Report (05-27-22 @ 14:01):    No Growth Final    Culture - Blood (collected 05-21-22 @ 21:57)  Source: .Blood Blood-Peripheral  Final Report (05-27-22 @ 14:01):    No Growth Final    Culture - Urine (collected 05-21-22 @ 20:51)  Source: Clean Catch Clean Catch (Midstream)  Final Report (05-23-22 @ 10:12):    <10,000 CFU/mL Normal Urogenital Elvira        Radiology: reviewed

## 2022-05-31 NOTE — PROGRESS NOTE ADULT - SUBJECTIVE AND OBJECTIVE BOX
BREA BECKHAM     [facility] 04    Allergies    codeine (Hives)    Intolerances        PAST MEDICAL & SURGICAL HISTORY:  Dementia of frontal lobe type      Aphasic stroke      Diabetes mellitus      Respiratory failure      Hypertension      GERD (gastroesophageal reflux disease)      Constipation      Respiratory failure      CVA (cerebral vascular accident)      HTN (hypertension)      DM (diabetes mellitus)      Advanced dementia      COVID-19 virus detected      Quadriplegia      Pneumonia      Type II diabetes mellitus      Hx of appendectomy      Gastrostomy in place      Tracheostomy in place      Tracheostomy tube present      Feeding by G-tube          FAMILY HISTORY:  No pertinent family history in first degree relatives        Home Medications:  albuterol 90 mcg/inh inhalation aerosol with adapter: 2  inhaled every 6 hours (21 May 2022 21:16)  Bacid (LAC) oral tablet: 2 tab(s) by gastrostomy tube once a day (21 May 2022 21:16)  Betadine 10% topical swab: cleanse right and left great toes (21 May 2022 21:16)  Carafate 1 g/10 mL oral suspension: 10 milliliter(s) by gastrostomy tube 4 times a day (before meals and at bedtime) for 14 days (Started 6/4/21) (21 May 2022 21:16)  chlorhexidine 0.12% mucous membrane liquid: 15 milliliter(s) mucous membrane 2 times a day (21 May 2022 21:16)  Eucerin topical cream: Apply topically to affected area once a day bilateral feet (21 May 2022 21:16)  folic acid 1 mg oral tablet: 1 tab(s) orally once a day (21 May 2022 21:16)  insulin glargine 100 units/mL subcutaneous solution: 5 unit(s) subcutaneous once a day (at bedtime) (29 May 2022 16:35)  ipratropium-albuterol 0.5 mg-2.5 mg/3 mL inhalation solution: 3 milliliter(s) inhaled 4 times a day (21 May 2022 21:16)  LORazepam 1 mg oral tablet: 1 tab(s) by gastrostomy tube every 4 hours (21 May 2022 21:16)  methocarbamol 500 mg oral tablet: 1 tab(s) by gastrostomy tube 2 times a day (21 May 2022 21:16)  MiraLax oral powder for reconstitution:  (21 May 2022 23:14)  Multiple Vitamins oral tablet: 1 tab(s) orally once a day (21 May 2022 21:16)  nystatin 100,000 units/g topical powder: 1 application topically 3 times a day (29 May 2022 16:35)  omeprazole 20 mg oral delayed release capsule: orally 2 times a day (21 May 2022 23:14)  polyethylene glycol 3350 oral powder for reconstitution: 17 gram(s) by gastrostomy tube every 12 hours (21 May 2022 21:16)  senna 8.6 mg oral tablet: 3 tab(s) by gastrostomy tube once a day (at bedtime) (21 May 2022 21:16)  simethicone 80 mg oral tablet, chewable: 1 tab(s) by gastrostomy tube every 6 hours (21 May 2022 21:16)  Tylenol 325 mg oral tablet: 2 tab(s) by gastrostomy tube once a day; 60 minutes prior to dressing change  (21 May 2022 21:16)  Tylenol 325 mg oral tablet: 2 tab(s) by gastrostomy tube every 6 hours, As Needed (21 May 2022 21:16)      MEDICATIONS  (STANDING):  chlorhexidine 0.12% Liquid 15 milliLiter(s) Oral Mucosa every 12 hours  chlorhexidine 2% Cloths 1 Application(s) Topical <User Schedule>  chlorhexidine 4% Liquid 1 Application(s) Topical <User Schedule>  collagenase Ointment 1 Application(s) Topical daily  dextrose 5%. 1000 milliLiter(s) (50 mL/Hr) IV Continuous <Continuous>  dextrose 5%. 1000 milliLiter(s) (100 mL/Hr) IV Continuous <Continuous>  dextrose 50% Injectable 25 Gram(s) IV Push once  dextrose 50% Injectable 12.5 Gram(s) IV Push once  dextrose 50% Injectable 25 Gram(s) IV Push once  folic acid 1 milliGRAM(s) Oral daily  glucagon  Injectable 1 milliGRAM(s) IntraMuscular once  heparin   Injectable 5000 Unit(s) SubCutaneous every 8 hours  insulin glargine Injectable (LANTUS) 5 Unit(s) SubCutaneous every morning  insulin lispro (ADMELOG) corrective regimen sliding scale   SubCutaneous every 6 hours  lactobacillus acidophilus 1 Tablet(s) Oral daily  LORazepam     Tablet 1 milliGRAM(s) Oral every 4 hours  mineral oil/petrolatum Hydrophilic Ointment 1 Application(s) Topical daily  multivitamin 1 Tablet(s) Oral daily  nystatin Powder 1 Application(s) Topical three times a day  pantoprazole  Injectable 40 milliGRAM(s) IV Push daily  povidone iodine 10% Solution 1 Application(s) Topical daily    MEDICATIONS  (PRN):  acetaminophen    Suspension .. 650 milliGRAM(s) Oral every 6 hours PRN Temp greater or equal to 38C (100.4F), Mild Pain (1 - 3)  albuterol/ipratropium for Nebulization 3 milliLiter(s) Nebulizer every 6 hours PRN Shortness of Breath and/or Wheezing  dextrose Oral Gel 15 Gram(s) Oral once PRN Blood Glucose LESS THAN 70 milliGRAM(s)/deciliter  LORazepam   Injectable 1 milliGRAM(s) IV Push every 6 hours PRN agitation/vent dyssynchrony  sodium chloride 0.9% lock flush 10 milliLiter(s) IV Push every 1 hour PRN Pre/post blood products, medications, blood draw, and to maintain line patency      Diet, NPO with Tube Feed:   Tube Feeding Modality: Gastrostomy  Glucerna 1.5 Horacio  Total Volume for 24 Hours (mL): 1200  Continuous  Starting Tube Feed Rate mL per Hour: 20  Increase Tube Feed Rate by (mL): 10     Every 6 hours  Until Goal Tube Feed Rate (mL per Hour): 60  Tube Feed Duration (in Hours): 20  Tube Feed Start Time: 00:00  Free Water Flush  Pump   Rate (mL per Hour): 25   Frequency: Every Hour    Duration (Hours): 24 (05-30-22 @ 15:26) [Active]          Vital Signs Last 24 Hrs  T(C): 36.6 (31 May 2022 03:38), Max: 37.2 (30 May 2022 16:01)  T(F): 97.9 (31 May 2022 03:38), Max: 99 (30 May 2022 16:01)  HR: 72 (31 May 2022 06:00) (63 - 85)  BP: 106/57 (31 May 2022 06:00) (92/55 - 122/64)  BP(mean): 72 (31 May 2022 06:00) (66 - 85)  RR: 26 (31 May 2022 06:00) (23 - 26)  SpO2: 100% (31 May 2022 06:00) (96% - 100%)      05-30-22 @ 07:01  -  05-31-22 @ 07:00  --------------------------------------------------------  IN: 1730 mL / OUT: 650 mL / NET: 1080 mL        Mode: AC/ CMV (Assist Control/ Continuous Mandatory Ventilation), RR (machine): 26, TV (machine): 400, FiO2: 35, PEEP: 5, ITime: 0.9, MAP: 15, PIP: 31      LABS:                        7.6    7.50  )-----------( 187      ( 31 May 2022 06:19 )             25.8     05-31    140  |  110<H>  |  32<H>  ----------------------------<  209<H>  5.0   |  26  |  1.07    Ca    8.4      31 May 2022 06:19  Phos  2.7     05-31  Mg     2.4     05-31                WBC:  WBC Count: 7.50 K/uL (05-31 @ 06:19)  WBC Count: 9.68 K/uL (05-30 @ 06:28)  WBC Count: 11.87 K/uL (05-29 @ 06:43)  WBC Count: 9.45 K/uL (05-28 @ 07:05)      MICROBIOLOGY:  RECENT CULTURES:  05-25 .Body Fluid Pleural Fluid XXXX   polymorphonuclear leukocytes seen  No organisms seen  by cytocentrifuge   Culture is being performed.                    Sodium:  Sodium, Serum: 140 mmol/L (05-31 @ 06:19)  Sodium, Serum: 145 mmol/L (05-30 @ 06:28)  Sodium, Serum: 143 mmol/L (05-29 @ 06:43)  Sodium, Serum: 142 mmol/L (05-28 @ 07:05)      1.07 mg/dL 05-31 @ 06:19  1.21 mg/dL 05-30 @ 06:28  1.13 mg/dL 05-29 @ 06:43  1.26 mg/dL 05-28 @ 07:05      Hemoglobin:  Hemoglobin: 7.6 g/dL (05-31 @ 06:19)  Hemoglobin: 7.7 g/dL (05-30 @ 16:31)  Hemoglobin: 7.5 g/dL (05-30 @ 06:28)  Hemoglobin: 8.4 g/dL (05-29 @ 06:43)  Hemoglobin: 8.3 g/dL (05-28 @ 07:05)      Platelets: Platelet Count - Automated: 187 K/uL (05-31 @ 06:19)  Platelet Count - Automated: 198 K/uL (05-30 @ 06:28)  Platelet Count - Automated: 227 K/uL (05-29 @ 06:43)  Platelet Count - Automated: 246 K/uL (05-28 @ 07:05)              RADIOLOGY & ADDITIONAL STUDIES:      MICROBIOLOGY:  RECENT CULTURES:  05-25 .Body Fluid Pleural Fluid XXXX   polymorphonuclear leukocytes seen  No organisms seen  by cytocentrifuge   Culture is being performed.

## 2022-05-31 NOTE — PROGRESS NOTE ADULT - ASSESSMENT
The patient is a 76 year old female with a history of HTN, DM, CVA, dementia, chronic respiratory failure s/p trach, PEG, cardiac arrest, anemia who presents with respiratory distress.    Plan:  - ECG with no evidence of ischemia or infarction  - Troponin elevated at 257 in the setting of respiratory failure and septic shock. No need to trend further.  - Echo with normal LV systolic function, mod pulm HTN  - Completed ceftazidime  - Pleural effusion - consistent with exudate - likely parapneumonic  - Pulm and ID follow-up  - Overall prognosis remains poor

## 2022-06-01 ENCOUNTER — TRANSCRIPTION ENCOUNTER (OUTPATIENT)
Age: 77
End: 2022-06-01

## 2022-06-01 ENCOUNTER — INPATIENT (INPATIENT)
Facility: HOSPITAL | Age: 77
LOS: 8 days | Discharge: NURSING, REHAB OR LONG-TERM CARE | DRG: 207 | End: 2022-06-10
Attending: INTERNAL MEDICINE | Admitting: INTERNAL MEDICINE
Payer: MEDICARE

## 2022-06-01 VITALS
SYSTOLIC BLOOD PRESSURE: 123 MMHG | HEIGHT: 65 IN | DIASTOLIC BLOOD PRESSURE: 72 MMHG | WEIGHT: 119.05 LBS | RESPIRATION RATE: 18 BRPM | TEMPERATURE: 98 F | HEART RATE: 81 BPM | OXYGEN SATURATION: 100 %

## 2022-06-01 VITALS
TEMPERATURE: 98 F | RESPIRATION RATE: 26 BRPM | OXYGEN SATURATION: 100 % | DIASTOLIC BLOOD PRESSURE: 65 MMHG | SYSTOLIC BLOOD PRESSURE: 130 MMHG | HEART RATE: 79 BPM

## 2022-06-01 DIAGNOSIS — Z93.1 GASTROSTOMY STATUS: Chronic | ICD-10-CM

## 2022-06-01 DIAGNOSIS — Z93.0 TRACHEOSTOMY STATUS: Chronic | ICD-10-CM

## 2022-06-01 DIAGNOSIS — J96.01 ACUTE RESPIRATORY FAILURE WITH HYPOXIA: ICD-10-CM

## 2022-06-01 LAB
ALBUMIN SERPL ELPH-MCNC: 2.2 G/DL — LOW (ref 3.3–5)
ALP SERPL-CCNC: 139 U/L — HIGH (ref 30–120)
ALT FLD-CCNC: 18 U/L DA — SIGNIFICANT CHANGE UP (ref 10–60)
ANION GAP SERPL CALC-SCNC: 6 MMOL/L — SIGNIFICANT CHANGE UP (ref 5–17)
ANION GAP SERPL CALC-SCNC: 7 MMOL/L — SIGNIFICANT CHANGE UP (ref 5–17)
AST SERPL-CCNC: 17 U/L — SIGNIFICANT CHANGE UP (ref 10–40)
BASE EXCESS BLDA CALC-SCNC: 2.5 MMOL/L — SIGNIFICANT CHANGE UP (ref -2–3)
BASOPHILS # BLD AUTO: 0.03 K/UL — SIGNIFICANT CHANGE UP (ref 0–0.2)
BASOPHILS NFR BLD AUTO: 0.3 % — SIGNIFICANT CHANGE UP (ref 0–2)
BILIRUB SERPL-MCNC: 0.6 MG/DL — SIGNIFICANT CHANGE UP (ref 0.2–1.2)
BUN SERPL-MCNC: 34 MG/DL — HIGH (ref 7–23)
BUN SERPL-MCNC: 37 MG/DL — HIGH (ref 7–23)
CALCIUM SERPL-MCNC: 8.6 MG/DL — SIGNIFICANT CHANGE UP (ref 8.4–10.5)
CALCIUM SERPL-MCNC: 9.3 MG/DL — SIGNIFICANT CHANGE UP (ref 8.4–10.5)
CHLORIDE SERPL-SCNC: 109 MMOL/L — HIGH (ref 96–108)
CHLORIDE SERPL-SCNC: 111 MMOL/L — HIGH (ref 96–108)
CO2 SERPL-SCNC: 25 MMOL/L — SIGNIFICANT CHANGE UP (ref 22–31)
CO2 SERPL-SCNC: 26 MMOL/L — SIGNIFICANT CHANGE UP (ref 22–31)
CREAT SERPL-MCNC: 1.08 MG/DL — SIGNIFICANT CHANGE UP (ref 0.5–1.3)
CREAT SERPL-MCNC: 1.13 MG/DL — SIGNIFICANT CHANGE UP (ref 0.5–1.3)
EGFR: 50 ML/MIN/1.73M2 — LOW
EGFR: 53 ML/MIN/1.73M2 — LOW
EOSINOPHIL # BLD AUTO: 0.04 K/UL — SIGNIFICANT CHANGE UP (ref 0–0.5)
EOSINOPHIL NFR BLD AUTO: 0.4 % — SIGNIFICANT CHANGE UP (ref 0–6)
GAS PNL BLDA: SIGNIFICANT CHANGE UP
GLUCOSE SERPL-MCNC: 184 MG/DL — HIGH (ref 70–99)
GLUCOSE SERPL-MCNC: 204 MG/DL — HIGH (ref 70–99)
HCO3 BLDA-SCNC: 26 MMOL/L — SIGNIFICANT CHANGE UP (ref 21–28)
HCT VFR BLD CALC: 29.8 % — LOW (ref 34.5–45)
HCT VFR BLD CALC: 34.7 % — SIGNIFICANT CHANGE UP (ref 34.5–45)
HGB BLD-MCNC: 10.2 G/DL — LOW (ref 11.5–15.5)
HGB BLD-MCNC: 8.8 G/DL — LOW (ref 11.5–15.5)
HOROWITZ INDEX BLDA+IHG-RTO: 100 — SIGNIFICANT CHANGE UP
IMM GRANULOCYTES NFR BLD AUTO: 0.7 % — SIGNIFICANT CHANGE UP (ref 0–1.5)
LACTATE SERPL-SCNC: 1.3 MMOL/L — SIGNIFICANT CHANGE UP (ref 0.7–2)
LYMPHOCYTES # BLD AUTO: 0.65 K/UL — LOW (ref 1–3.3)
LYMPHOCYTES # BLD AUTO: 6.7 % — LOW (ref 13–44)
MCHC RBC-ENTMCNC: 26.2 PG — LOW (ref 27–34)
MCHC RBC-ENTMCNC: 26.4 PG — LOW (ref 27–34)
MCHC RBC-ENTMCNC: 29.4 GM/DL — LOW (ref 32–36)
MCHC RBC-ENTMCNC: 29.5 GM/DL — LOW (ref 32–36)
MCV RBC AUTO: 88.7 FL — SIGNIFICANT CHANGE UP (ref 80–100)
MCV RBC AUTO: 89.9 FL — SIGNIFICANT CHANGE UP (ref 80–100)
MONOCYTES # BLD AUTO: 0.88 K/UL — SIGNIFICANT CHANGE UP (ref 0–0.9)
MONOCYTES NFR BLD AUTO: 9 % — SIGNIFICANT CHANGE UP (ref 2–14)
NEUTROPHILS # BLD AUTO: 8.07 K/UL — HIGH (ref 1.8–7.4)
NEUTROPHILS NFR BLD AUTO: 82.9 % — HIGH (ref 43–77)
NRBC # BLD: 0 /100 WBCS — SIGNIFICANT CHANGE UP (ref 0–0)
NRBC # BLD: 0 /100 WBCS — SIGNIFICANT CHANGE UP (ref 0–0)
NT-PROBNP SERPL-SCNC: 7630 PG/ML — HIGH (ref 0–450)
PCO2 BLDA: 36 MMHG — HIGH (ref 32–35)
PH BLDA: 7.47 — HIGH (ref 7.35–7.45)
PLATELET # BLD AUTO: 197 K/UL — SIGNIFICANT CHANGE UP (ref 150–400)
PLATELET # BLD AUTO: 218 K/UL — SIGNIFICANT CHANGE UP (ref 150–400)
PO2 BLDA: 261 MMHG — HIGH (ref 83–108)
POTASSIUM SERPL-MCNC: 4.7 MMOL/L — SIGNIFICANT CHANGE UP (ref 3.5–5.3)
POTASSIUM SERPL-MCNC: 5.4 MMOL/L — HIGH (ref 3.5–5.3)
POTASSIUM SERPL-SCNC: 4.7 MMOL/L — SIGNIFICANT CHANGE UP (ref 3.5–5.3)
POTASSIUM SERPL-SCNC: 5.4 MMOL/L — HIGH (ref 3.5–5.3)
PROT SERPL-MCNC: 7.6 G/DL — SIGNIFICANT CHANGE UP (ref 6–8.3)
RBC # BLD: 3.36 M/UL — LOW (ref 3.8–5.2)
RBC # BLD: 3.86 M/UL — SIGNIFICANT CHANGE UP (ref 3.8–5.2)
RBC # FLD: 20.7 % — HIGH (ref 10.3–14.5)
RBC # FLD: 21.2 % — HIGH (ref 10.3–14.5)
SAO2 % BLDA: 100 % — HIGH (ref 94–98)
SARS-COV-2 RNA SPEC QL NAA+PROBE: SIGNIFICANT CHANGE UP
SARS-COV-2 RNA SPEC QL NAA+PROBE: SIGNIFICANT CHANGE UP
SODIUM SERPL-SCNC: 141 MMOL/L — SIGNIFICANT CHANGE UP (ref 135–145)
SODIUM SERPL-SCNC: 143 MMOL/L — SIGNIFICANT CHANGE UP (ref 135–145)
TROPONIN I, HIGH SENSITIVITY RESULT: 32 NG/L — SIGNIFICANT CHANGE UP
WBC # BLD: 7.53 K/UL — SIGNIFICANT CHANGE UP (ref 3.8–10.5)
WBC # BLD: 9.74 K/UL — SIGNIFICANT CHANGE UP (ref 3.8–10.5)
WBC # FLD AUTO: 7.53 K/UL — SIGNIFICANT CHANGE UP (ref 3.8–10.5)
WBC # FLD AUTO: 9.74 K/UL — SIGNIFICANT CHANGE UP (ref 3.8–10.5)

## 2022-06-01 PROCEDURE — 71045 X-RAY EXAM CHEST 1 VIEW: CPT | Mod: 26

## 2022-06-01 PROCEDURE — 93970 EXTREMITY STUDY: CPT

## 2022-06-01 PROCEDURE — 83540 ASSAY OF IRON: CPT

## 2022-06-01 PROCEDURE — 80074 ACUTE HEPATITIS PANEL: CPT

## 2022-06-01 PROCEDURE — 87070 CULTURE OTHR SPECIMN AEROBIC: CPT

## 2022-06-01 PROCEDURE — 83615 LACTATE (LD) (LDH) ENZYME: CPT

## 2022-06-01 PROCEDURE — 88305 TISSUE EXAM BY PATHOLOGIST: CPT

## 2022-06-01 PROCEDURE — 76705 ECHO EXAM OF ABDOMEN: CPT

## 2022-06-01 PROCEDURE — P9016: CPT

## 2022-06-01 PROCEDURE — 96365 THER/PROPH/DIAG IV INF INIT: CPT

## 2022-06-01 PROCEDURE — 88108 CYTOPATH CONCENTRATE TECH: CPT

## 2022-06-01 PROCEDURE — 71250 CT THORAX DX C-: CPT | Mod: 26

## 2022-06-01 PROCEDURE — 85014 HEMATOCRIT: CPT

## 2022-06-01 PROCEDURE — 32555 ASPIRATE PLEURA W/ IMAGING: CPT

## 2022-06-01 PROCEDURE — 36430 TRANSFUSION BLD/BLD COMPNT: CPT

## 2022-06-01 PROCEDURE — 84311 SPECTROPHOTOMETRY: CPT

## 2022-06-01 PROCEDURE — 94760 N-INVAS EAR/PLS OXIMETRY 1: CPT

## 2022-06-01 PROCEDURE — 96375 TX/PRO/DX INJ NEW DRUG ADDON: CPT

## 2022-06-01 PROCEDURE — 99223 1ST HOSP IP/OBS HIGH 75: CPT | Mod: AI

## 2022-06-01 PROCEDURE — 84100 ASSAY OF PHOSPHORUS: CPT

## 2022-06-01 PROCEDURE — 81001 URINALYSIS AUTO W/SCOPE: CPT

## 2022-06-01 PROCEDURE — 71045 X-RAY EXAM CHEST 1 VIEW: CPT

## 2022-06-01 PROCEDURE — 84157 ASSAY OF PROTEIN OTHER: CPT

## 2022-06-01 PROCEDURE — 83986 ASSAY PH BODY FLUID NOS: CPT

## 2022-06-01 PROCEDURE — 86850 RBC ANTIBODY SCREEN: CPT

## 2022-06-01 PROCEDURE — 94799 UNLISTED PULMONARY SVC/PX: CPT

## 2022-06-01 PROCEDURE — 93010 ELECTROCARDIOGRAM REPORT: CPT

## 2022-06-01 PROCEDURE — 36600 WITHDRAWAL OF ARTERIAL BLOOD: CPT

## 2022-06-01 PROCEDURE — 87635 SARS-COV-2 COVID-19 AMP PRB: CPT

## 2022-06-01 PROCEDURE — 94640 AIRWAY INHALATION TREATMENT: CPT

## 2022-06-01 PROCEDURE — 84145 PROCALCITONIN (PCT): CPT

## 2022-06-01 PROCEDURE — 83605 ASSAY OF LACTIC ACID: CPT

## 2022-06-01 PROCEDURE — 87205 SMEAR GRAM STAIN: CPT

## 2022-06-01 PROCEDURE — 87641 MR-STAPH DNA AMP PROBE: CPT

## 2022-06-01 PROCEDURE — 87449 NOS EACH ORGANISM AG IA: CPT

## 2022-06-01 PROCEDURE — 87116 MYCOBACTERIA CULTURE: CPT

## 2022-06-01 PROCEDURE — 93306 TTE W/DOPPLER COMPLETE: CPT

## 2022-06-01 PROCEDURE — 87040 BLOOD CULTURE FOR BACTERIA: CPT

## 2022-06-01 PROCEDURE — 82607 VITAMIN B-12: CPT

## 2022-06-01 PROCEDURE — 82272 OCCULT BLD FECES 1-3 TESTS: CPT

## 2022-06-01 PROCEDURE — 89051 BODY FLUID CELL COUNT: CPT

## 2022-06-01 PROCEDURE — 86923 COMPATIBILITY TEST ELECTRIC: CPT

## 2022-06-01 PROCEDURE — 85018 HEMOGLOBIN: CPT

## 2022-06-01 PROCEDURE — 82803 BLOOD GASES ANY COMBINATION: CPT

## 2022-06-01 PROCEDURE — 87086 URINE CULTURE/COLONY COUNT: CPT

## 2022-06-01 PROCEDURE — C1729: CPT

## 2022-06-01 PROCEDURE — 82042 OTHER SOURCE ALBUMIN QUAN EA: CPT

## 2022-06-01 PROCEDURE — 94003 VENT MGMT INPAT SUBQ DAY: CPT

## 2022-06-01 PROCEDURE — 85730 THROMBOPLASTIN TIME PARTIAL: CPT

## 2022-06-01 PROCEDURE — 86900 BLOOD TYPING SEROLOGIC ABO: CPT

## 2022-06-01 PROCEDURE — 86901 BLOOD TYPING SEROLOGIC RH(D): CPT

## 2022-06-01 PROCEDURE — 99291 CRITICAL CARE FIRST HOUR: CPT

## 2022-06-01 PROCEDURE — 83550 IRON BINDING TEST: CPT

## 2022-06-01 PROCEDURE — 93005 ELECTROCARDIOGRAM TRACING: CPT

## 2022-06-01 PROCEDURE — 80048 BASIC METABOLIC PNL TOTAL CA: CPT

## 2022-06-01 PROCEDURE — 82962 GLUCOSE BLOOD TEST: CPT

## 2022-06-01 PROCEDURE — 94002 VENT MGMT INPAT INIT DAY: CPT

## 2022-06-01 PROCEDURE — 85025 COMPLETE CBC W/AUTO DIFF WBC: CPT

## 2022-06-01 PROCEDURE — 0225U NFCT DS DNA&RNA 21 SARSCOV2: CPT

## 2022-06-01 PROCEDURE — 99285 EMERGENCY DEPT VISIT HI MDM: CPT | Mod: CS

## 2022-06-01 PROCEDURE — 87493 C DIFF AMPLIFIED PROBE: CPT

## 2022-06-01 PROCEDURE — 87206 SMEAR FLUORESCENT/ACID STAI: CPT

## 2022-06-01 PROCEDURE — 87186 SC STD MICRODIL/AGAR DIL: CPT

## 2022-06-01 PROCEDURE — 83735 ASSAY OF MAGNESIUM: CPT

## 2022-06-01 PROCEDURE — 80053 COMPREHEN METABOLIC PANEL: CPT

## 2022-06-01 PROCEDURE — 87075 CULTR BACTERIA EXCEPT BLOOD: CPT

## 2022-06-01 PROCEDURE — 82945 GLUCOSE OTHER FLUID: CPT

## 2022-06-01 PROCEDURE — 71250 CT THORAX DX C-: CPT

## 2022-06-01 PROCEDURE — 36415 COLL VENOUS BLD VENIPUNCTURE: CPT

## 2022-06-01 PROCEDURE — 83880 ASSAY OF NATRIURETIC PEPTIDE: CPT

## 2022-06-01 PROCEDURE — 85027 COMPLETE CBC AUTOMATED: CPT

## 2022-06-01 PROCEDURE — 96367 TX/PROPH/DG ADDL SEQ IV INF: CPT

## 2022-06-01 PROCEDURE — 87640 STAPH A DNA AMP PROBE: CPT

## 2022-06-01 PROCEDURE — 82728 ASSAY OF FERRITIN: CPT

## 2022-06-01 PROCEDURE — 87015 SPECIMEN INFECT AGNT CONCNTJ: CPT

## 2022-06-01 PROCEDURE — 85610 PROTHROMBIN TIME: CPT

## 2022-06-01 PROCEDURE — 87102 FUNGUS ISOLATION CULTURE: CPT

## 2022-06-01 PROCEDURE — 84484 ASSAY OF TROPONIN QUANT: CPT

## 2022-06-01 PROCEDURE — 83690 ASSAY OF LIPASE: CPT

## 2022-06-01 RX ORDER — DEXTROSE 50 % IN WATER 50 %
25 SYRINGE (ML) INTRAVENOUS ONCE
Refills: 0 | Status: DISCONTINUED | OUTPATIENT
Start: 2022-06-01 | End: 2022-06-10

## 2022-06-01 RX ORDER — IPRATROPIUM/ALBUTEROL SULFATE 18-103MCG
3 AEROSOL WITH ADAPTER (GRAM) INHALATION EVERY 6 HOURS
Refills: 0 | Status: DISCONTINUED | OUTPATIENT
Start: 2022-06-01 | End: 2022-06-10

## 2022-06-01 RX ORDER — SODIUM CHLORIDE 9 MG/ML
1000 INJECTION, SOLUTION INTRAVENOUS
Refills: 0 | Status: DISCONTINUED | OUTPATIENT
Start: 2022-06-01 | End: 2022-06-10

## 2022-06-01 RX ORDER — INSULIN GLARGINE 100 [IU]/ML
8 INJECTION, SOLUTION SUBCUTANEOUS
Qty: 0 | Refills: 0 | DISCHARGE
Start: 2022-06-01

## 2022-06-01 RX ORDER — NYSTATIN CREAM 100000 [USP'U]/G
1 CREAM TOPICAL EVERY 12 HOURS
Refills: 0 | Status: DISCONTINUED | OUTPATIENT
Start: 2022-06-01 | End: 2022-06-10

## 2022-06-01 RX ORDER — FOLIC ACID 0.8 MG
1 TABLET ORAL DAILY
Refills: 0 | Status: DISCONTINUED | OUTPATIENT
Start: 2022-06-01 | End: 2022-06-10

## 2022-06-01 RX ORDER — CHLORHEXIDINE GLUCONATE 213 G/1000ML
15 SOLUTION TOPICAL EVERY 12 HOURS
Refills: 0 | Status: DISCONTINUED | OUTPATIENT
Start: 2022-06-01 | End: 2022-06-10

## 2022-06-01 RX ORDER — INSULIN GLARGINE 100 [IU]/ML
8 INJECTION, SOLUTION SUBCUTANEOUS EVERY MORNING
Refills: 0 | Status: DISCONTINUED | OUTPATIENT
Start: 2022-06-01 | End: 2022-06-01

## 2022-06-01 RX ORDER — HEPARIN SODIUM 5000 [USP'U]/ML
5000 INJECTION INTRAVENOUS; SUBCUTANEOUS EVERY 8 HOURS
Refills: 0 | Status: DISCONTINUED | OUTPATIENT
Start: 2022-06-01 | End: 2022-06-07

## 2022-06-01 RX ORDER — DEXTROSE 50 % IN WATER 50 %
15 SYRINGE (ML) INTRAVENOUS ONCE
Refills: 0 | Status: DISCONTINUED | OUTPATIENT
Start: 2022-06-01 | End: 2022-06-10

## 2022-06-01 RX ORDER — INSULIN GLARGINE 100 [IU]/ML
8 INJECTION, SOLUTION SUBCUTANEOUS EVERY MORNING
Refills: 0 | Status: DISCONTINUED | OUTPATIENT
Start: 2022-06-02 | End: 2022-06-10

## 2022-06-01 RX ORDER — DEXTROSE 50 % IN WATER 50 %
12.5 SYRINGE (ML) INTRAVENOUS ONCE
Refills: 0 | Status: DISCONTINUED | OUTPATIENT
Start: 2022-06-01 | End: 2022-06-10

## 2022-06-01 RX ORDER — LACTOBACILLUS ACIDOPHILUS 100MM CELL
1 CAPSULE ORAL DAILY
Refills: 0 | Status: DISCONTINUED | OUTPATIENT
Start: 2022-06-01 | End: 2022-06-10

## 2022-06-01 RX ORDER — CHLORHEXIDINE GLUCONATE 213 G/1000ML
1 SOLUTION TOPICAL
Refills: 0 | Status: DISCONTINUED | OUTPATIENT
Start: 2022-06-01 | End: 2022-06-02

## 2022-06-01 RX ORDER — ACETAMINOPHEN 500 MG
650 TABLET ORAL EVERY 6 HOURS
Refills: 0 | Status: DISCONTINUED | OUTPATIENT
Start: 2022-06-01 | End: 2022-06-03

## 2022-06-01 RX ORDER — GLUCAGON INJECTION, SOLUTION 0.5 MG/.1ML
1 INJECTION, SOLUTION SUBCUTANEOUS ONCE
Refills: 0 | Status: DISCONTINUED | OUTPATIENT
Start: 2022-06-01 | End: 2022-06-10

## 2022-06-01 RX ORDER — INSULIN GLARGINE 100 [IU]/ML
3 INJECTION, SOLUTION SUBCUTANEOUS ONCE
Refills: 0 | Status: COMPLETED | OUTPATIENT
Start: 2022-06-01 | End: 2022-06-01

## 2022-06-01 RX ORDER — COLLAGENASE CLOSTRIDIUM HIST. 250 UNIT/G
1 OINTMENT (GRAM) TOPICAL DAILY
Refills: 0 | Status: DISCONTINUED | OUTPATIENT
Start: 2022-06-01 | End: 2022-06-10

## 2022-06-01 RX ORDER — POVIDONE-IODINE 5 %
1 AEROSOL (ML) TOPICAL DAILY
Refills: 0 | Status: DISCONTINUED | OUTPATIENT
Start: 2022-06-01 | End: 2022-06-10

## 2022-06-01 RX ORDER — INSULIN LISPRO 100/ML
VIAL (ML) SUBCUTANEOUS EVERY 6 HOURS
Refills: 0 | Status: DISCONTINUED | OUTPATIENT
Start: 2022-06-01 | End: 2022-06-10

## 2022-06-01 RX ORDER — PANTOPRAZOLE SODIUM 20 MG/1
40 TABLET, DELAYED RELEASE ORAL DAILY
Refills: 0 | Status: DISCONTINUED | OUTPATIENT
Start: 2022-06-01 | End: 2022-06-10

## 2022-06-01 RX ORDER — FUROSEMIDE 40 MG
40 TABLET ORAL ONCE
Refills: 0 | Status: COMPLETED | OUTPATIENT
Start: 2022-06-01 | End: 2022-06-01

## 2022-06-01 RX ADMIN — Medication 1 TABLET(S): at 11:04

## 2022-06-01 RX ADMIN — CHLORHEXIDINE GLUCONATE 1 APPLICATION(S): 213 SOLUTION TOPICAL at 05:30

## 2022-06-01 RX ADMIN — Medication 1 MILLIGRAM(S): at 11:04

## 2022-06-01 RX ADMIN — PANTOPRAZOLE SODIUM 40 MILLIGRAM(S): 20 TABLET, DELAYED RELEASE ORAL at 11:04

## 2022-06-01 RX ADMIN — Medication 1 MILLIGRAM(S): at 11:05

## 2022-06-01 RX ADMIN — NYSTATIN CREAM 1 APPLICATION(S): 100000 CREAM TOPICAL at 05:09

## 2022-06-01 RX ADMIN — Medication 2: at 00:07

## 2022-06-01 RX ADMIN — Medication 2 MILLIGRAM(S): at 23:50

## 2022-06-01 RX ADMIN — INSULIN GLARGINE 3 UNIT(S): 100 INJECTION, SOLUTION SUBCUTANEOUS at 08:34

## 2022-06-01 RX ADMIN — Medication 2: at 05:10

## 2022-06-01 RX ADMIN — Medication 1 MILLIGRAM(S): at 05:09

## 2022-06-01 RX ADMIN — Medication 1 MILLIGRAM(S): at 01:22

## 2022-06-01 RX ADMIN — Medication 1 TABLET(S): at 11:05

## 2022-06-01 RX ADMIN — HEPARIN SODIUM 5000 UNIT(S): 5000 INJECTION INTRAVENOUS; SUBCUTANEOUS at 05:08

## 2022-06-01 RX ADMIN — INSULIN GLARGINE 5 UNIT(S): 100 INJECTION, SOLUTION SUBCUTANEOUS at 07:20

## 2022-06-01 RX ADMIN — CHLORHEXIDINE GLUCONATE 15 MILLILITER(S): 213 SOLUTION TOPICAL at 05:08

## 2022-06-01 RX ADMIN — CHLORHEXIDINE GLUCONATE 1 APPLICATION(S): 213 SOLUTION TOPICAL at 05:09

## 2022-06-01 NOTE — ED ADULT NURSE NOTE - SUICIDE SCREENING QUESTION 1
Health Maintenance Due   Topic Date Due   • Shingles Vaccine (2 of 3) 02/03/2015   • Depression Screening  05/16/2019   • Diabetes Eye Exam  07/03/2019       Patient is due for topics listed above, he wishes to proceed with Immunization(s) Shingles, Depression Screening  and Diabetes Eye Exam, but is not proceeding with Immunization(s) Shingles, Depression Screening  and Diabetes Eye Exam at this time. The following has occured: Education provided for Immunization(s) Shingles, Depression Screening  and Diabetes Eye Exam             Patient unable to complete

## 2022-06-01 NOTE — H&P ADULT - NSHPLABSRESULTS_GEN_ALL_CORE
-                     10.2   9.74   )----------(  218       ( 01 Jun 2022 22:18 )               34.7          143    |  111    |  37     ----------------------------<  184        ( 01 Jun 2022 22:18 )  5.4     |  25     |  1.13     Ca    9.3        ( 01 Jun 2022 22:18 )    TPro  7.6    /  Alb  2.2    /  TBili  0.6    /  DBili  x      /  AST  17     /  ALT  18     /  AlkPhos  139    ( 01 Jun 2022 22:18 )      LIVER FUNCTIONS - ( 01 Jun 2022 22:18 )  Alb: 2.2 g/dL / Pro: 7.6 g/dL / ALK PHOS: 139 U/L / ALT: 18 U/L DA / AST: 17 U/L / GGT: x               COVID-19 PCR: NotDetec (01 Jun 2022 21:59)  COVID-19 PCR: NotDetec (01 Jun 2022 10:45)  COVID-19 PCR: NotDetec (28 May 2022 07:10)  SARS-CoV-2: NotDetec (21 May 2022 20:51)  COVID-19 PCR: NotDetec (28 Apr 2022 08:35)  SARS-CoV-2: NotDetec (21 Apr 2022 13:04)  COVID-19 PCR: NotDetec (11 Jan 2022 12:03)  SARS-CoV-2: NotDetec (06 Jan 2022 21:21)  COVID-19 PCR: NotDetec (20 Dec 2021 07:28)  COVID-19 PCR: NotDetec (13 Dec 2021 19:12)  SARS-CoV-2: NotDetec (08 Dec 2021 11:59)    Lactate, Blood (06.01.22 @ 22:46)   Lactate, Blood: 1.3 mmol/L     Troponin I, High Sensitivity (06.01.22 @ 22:46)   Troponin I, High Sensitivity Result: 32.0    Serum Pro-Brain Natriuretic Peptide (06.01.22 @ 22:46)   Serum Pro-Brain Natriuretic Peptide: 7630 pg/mL       Blood Gas Profile - Arterial (06.01.22 @ 22:14)   pH, Arterial: 7.47   pCO2, Arterial: 36 mmHg   pO2, Arterial: 261 mmHg   HCO3, Arterial: 26 mmol/L   Base Excess, Arterial: 2.5 mmol/L   Oxygen Saturation, Arterial: 100.0 %   FIO2, Arterial: 100.0   Blood Gas Source Arterial: Arterial       CT CHEST:                          PROCEDURE DATE:  06/01/2022    *****PRELIMINARY REPORT******   INTERPRETATION:  Moderate bilateral pleural effsions; increased on left   c/w 5/25 exam.  Pulmonary edema.  Personally reviewed by me.      CXR:    As per my review shows tracheostomy tube in place, normal cardiac shadow size, pulmonary edema, increased B/L pleural effusion compared with yesterday CXR. Pending official report.         -

## 2022-06-01 NOTE — H&P ADULT - PROBLEM SELECTOR PLAN 6
controlled, continue on insulin Glargine 8 units daily in addition to corrective insulin Lispro scale coverage Q 6h.

## 2022-06-01 NOTE — ED PROVIDER NOTE - OBJECTIVE STATEMENT
76 y.o. F CARLENE from [facility] nursing home for respiratory distress - pt was discharged today from [place], pt is vent dependent, was admitted from 5/21/22, just discharged this afternoon - has pna - aspiration vs VAP, cultures have grown CRE and pseudomonas, also with pleural effusions (s/p thoracentesis), completed abx in hospital, per  was supposed to leave yesterday, but spiked fever, today on return to NH, pt's  reports she appeared in respiratory distress, on return to NH, states they were having trouble with the vent and getting her on the right settings, was hypoxic until they increased the FiO2 to 100%

## 2022-06-01 NOTE — H&P ADULT - HISTORY OF PRESENT ILLNESS
This is an 78 y/o F with PMH of HTN, DM type 2, Cardiac Arrest, Core Pulmonale, CVA, COPD, Chronic Respiratory Failure Vent Dependant s/p trach & PEG, COVID-19 Infection, CKD, Anemia, GERD, and Dementia who was sent from [facility] facility for acute respiratory distress. Patient was discharged this afternoon from [facility] after 10 days admission for acute on chronic respiratory failure 2ry to [facility] with parapneumonic pleural effusion, had thoracentesis on May 25th, and completed a course of Avycaz 2 days prior to discharge, then monitored off antibiotics till discharge without spiking any fever. Patient is non communicating, no further history could be obtained at this time.

## 2022-06-01 NOTE — PROGRESS NOTE ADULT - SUBJECTIVE AND OBJECTIVE BOX
BREA BECKHAM     [facility]    Allergies    codeine (Hives)    Intolerances        PAST MEDICAL & SURGICAL HISTORY:  Dementia of frontal lobe type      Aphasic stroke      Diabetes mellitus      Respiratory failure      Hypertension      GERD (gastroesophageal reflux disease)      Constipation      Respiratory failure      CVA (cerebral vascular accident)      HTN (hypertension)      DM (diabetes mellitus)      Advanced dementia      COVID-19 virus detected      Quadriplegia      Pneumonia      Type II diabetes mellitus      Hx of appendectomy      Gastrostomy in place      Tracheostomy in place      Tracheostomy tube present      Feeding by G-tube          FAMILY HISTORY:  No pertinent family history in first degree relatives        Home Medications:  albuterol 90 mcg/inh inhalation aerosol with adapter: 2  inhaled every 6 hours (21 May 2022 21:16)  Bacid (LAC) oral tablet: 2 tab(s) by gastrostomy tube once a day (21 May 2022 21:16)  Betadine 10% topical swab: cleanse right and left great toes (21 May 2022 21:16)  Carafate 1 g/10 mL oral suspension: 10 milliliter(s) by gastrostomy tube 4 times a day (before meals and at bedtime) for 14 days (Started 6/4/21) (21 May 2022 21:16)  chlorhexidine 0.12% mucous membrane liquid: 15 milliliter(s) mucous membrane 2 times a day (21 May 2022 21:16)  Eucerin topical cream: Apply topically to affected area once a day bilateral feet (21 May 2022 21:16)  folic acid 1 mg oral tablet: 1 tab(s) orally once a day (21 May 2022 21:16)  insulin glargine 100 units/mL subcutaneous solution: 5 unit(s) subcutaneous once a day (at bedtime) (29 May 2022 16:35)  ipratropium-albuterol 0.5 mg-2.5 mg/3 mL inhalation solution: 3 milliliter(s) inhaled 4 times a day (21 May 2022 21:16)  LORazepam 1 mg oral tablet: 1 tab(s) by gastrostomy tube every 4 hours (21 May 2022 21:16)  methocarbamol 500 mg oral tablet: 1 tab(s) by gastrostomy tube 2 times a day (21 May 2022 21:16)  MiraLax oral powder for reconstitution:  (21 May 2022 23:14)  Multiple Vitamins oral tablet: 1 tab(s) orally once a day (21 May 2022 21:16)  nystatin 100,000 units/g topical powder: 1 application topically 3 times a day (29 May 2022 16:35)  omeprazole 20 mg oral delayed release capsule: orally 2 times a day (21 May 2022 23:14)  polyethylene glycol 3350 oral powder for reconstitution: 17 gram(s) by gastrostomy tube every 12 hours (21 May 2022 21:16)  senna 8.6 mg oral tablet: 3 tab(s) by gastrostomy tube once a day (at bedtime) (21 May 2022 21:16)  simethicone 80 mg oral tablet, chewable: 1 tab(s) by gastrostomy tube every 6 hours (21 May 2022 21:16)  Tylenol 325 mg oral tablet: 2 tab(s) by gastrostomy tube once a day; 60 minutes prior to dressing change  (21 May 2022 21:16)  Tylenol 325 mg oral tablet: 2 tab(s) by gastrostomy tube every 6 hours, As Needed (21 May 2022 21:16)      MEDICATIONS  (STANDING):  chlorhexidine 0.12% Liquid 15 milliLiter(s) Oral Mucosa every 12 hours  chlorhexidine 2% Cloths 1 Application(s) Topical <User Schedule>  chlorhexidine 4% Liquid 1 Application(s) Topical <User Schedule>  collagenase Ointment 1 Application(s) Topical daily  dextrose 5%. 1000 milliLiter(s) (50 mL/Hr) IV Continuous <Continuous>  dextrose 5%. 1000 milliLiter(s) (100 mL/Hr) IV Continuous <Continuous>  dextrose 50% Injectable 25 Gram(s) IV Push once  dextrose 50% Injectable 12.5 Gram(s) IV Push once  dextrose 50% Injectable 25 Gram(s) IV Push once  folic acid 1 milliGRAM(s) Oral daily  glucagon  Injectable 1 milliGRAM(s) IntraMuscular once  heparin   Injectable 5000 Unit(s) SubCutaneous every 8 hours  insulin glargine Injectable (LANTUS) 5 Unit(s) SubCutaneous every morning  insulin lispro (ADMELOG) corrective regimen sliding scale   SubCutaneous every 6 hours  lactobacillus acidophilus 1 Tablet(s) Oral daily  LORazepam     Tablet 1 milliGRAM(s) Oral every 4 hours  mineral oil/petrolatum Hydrophilic Ointment 1 Application(s) Topical daily  multivitamin 1 Tablet(s) Oral daily  nystatin Powder 1 Application(s) Topical three times a day  pantoprazole  Injectable 40 milliGRAM(s) IV Push daily  povidone iodine 10% Solution 1 Application(s) Topical daily    MEDICATIONS  (PRN):  acetaminophen    Suspension .. 650 milliGRAM(s) Oral every 6 hours PRN Temp greater or equal to 38C (100.4F), Mild Pain (1 - 3)  albuterol/ipratropium for Nebulization 3 milliLiter(s) Nebulizer every 6 hours PRN Shortness of Breath and/or Wheezing  dextrose Oral Gel 15 Gram(s) Oral once PRN Blood Glucose LESS THAN 70 milliGRAM(s)/deciliter  LORazepam   Injectable 1 milliGRAM(s) IV Push every 6 hours PRN agitation/vent dyssynchrony  sodium chloride 0.9% lock flush 10 milliLiter(s) IV Push every 1 hour PRN Pre/post blood products, medications, blood draw, and to maintain line patency      Diet, NPO with Tube Feed:   Tube Feeding Modality: Gastrostomy  Glucerna 1.5 Horacio  Total Volume for 24 Hours (mL): 1200  Continuous  Starting Tube Feed Rate mL per Hour: 20  Increase Tube Feed Rate by (mL): 10     Every 6 hours  Until Goal Tube Feed Rate (mL per Hour): 60  Tube Feed Duration (in Hours): 20  Tube Feed Start Time: 00:00  Free Water Flush  Pump   Rate (mL per Hour): 25   Frequency: Every Hour    Duration (Hours): 24 (05-30-22 @ 15:26) [Active]          Vital Signs Last 24 Hrs  T(C): 37.1 (01 Jun 2022 03:42), Max: 37.7 (31 May 2022 10:03)  T(F): 98.7 (01 Jun 2022 03:42), Max: 99.9 (31 May 2022 10:03)  HR: 65 (01 Jun 2022 06:00) (63 - 88)  BP: 112/62 (01 Jun 2022 06:00) (89/55 - 180/73)  BP(mean): 79 (01 Jun 2022 06:00) (66 - 102)  RR: 26 (01 Jun 2022 06:00) (21 - 30)  SpO2: 97% (01 Jun 2022 06:00) (95% - 100%)      05-30-22 @ 07:01 - 05-31-22 @ 07:00  --------------------------------------------------------  IN: 1730 mL / OUT: 650 mL / NET: 1080 mL    05-31-22 @ 07:01  -  06-01-22 @ 06:31  --------------------------------------------------------  IN: 2040 mL / OUT: 1000 mL / NET: 1040 mL        Mode: AC/ CMV (Assist Control/ Continuous Mandatory Ventilation), RR (machine): 26, TV (machine): 400, FiO2: 35, PEEP: 5, ITime: 0.9, MAP: 17, PIP: 34      LABS:                        7.6    7.50  )-----------( 187      ( 31 May 2022 06:19 )             25.8     05-31    140  |  110<H>  |  32<H>  ----------------------------<  209<H>  5.0   |  26  |  1.07    Ca    8.4      31 May 2022 06:19  Phos  2.7     05-31  Mg     2.4     05-31                WBC:  WBC Count: 7.50 K/uL (05-31 @ 06:19)  WBC Count: 9.68 K/uL (05-30 @ 06:28)  WBC Count: 11.87 K/uL (05-29 @ 06:43)  WBC Count: 9.45 K/uL (05-28 @ 07:05)      MICROBIOLOGY:  RECENT CULTURES:  05-25 .Body Fluid Pleural Fluid XXXX   polymorphonuclear leukocytes seen  No organisms seen  by cytocentrifuge   Culture is being performed.                    Sodium:  Sodium, Serum: 140 mmol/L (05-31 @ 06:19)  Sodium, Serum: 145 mmol/L (05-30 @ 06:28)  Sodium, Serum: 143 mmol/L (05-29 @ 06:43)  Sodium, Serum: 142 mmol/L (05-28 @ 07:05)      1.07 mg/dL 05-31 @ 06:19  1.21 mg/dL 05-30 @ 06:28  1.13 mg/dL 05-29 @ 06:43  1.26 mg/dL 05-28 @ 07:05      Hemoglobin:  Hemoglobin: 7.6 g/dL (05-31 @ 06:19)  Hemoglobin: 7.7 g/dL (05-30 @ 16:31)  Hemoglobin: 7.5 g/dL (05-30 @ 06:28)  Hemoglobin: 8.4 g/dL (05-29 @ 06:43)  Hemoglobin: 8.3 g/dL (05-28 @ 07:05)      Platelets: Platelet Count - Automated: 187 K/uL (05-31 @ 06:19)  Platelet Count - Automated: 198 K/uL (05-30 @ 06:28)  Platelet Count - Automated: 227 K/uL (05-29 @ 06:43)  Platelet Count - Automated: 246 K/uL (05-28 @ 07:05)              RADIOLOGY & ADDITIONAL STUDIES:      MICROBIOLOGY:  RECENT CULTURES:  05-25 .Body Fluid Pleural Fluid XXXX   polymorphonuclear leukocytes seen  No organisms seen  by cytocentrifuge   Culture is being performed.

## 2022-06-01 NOTE — DISCHARGE NOTE NURSING/CASE MANAGEMENT/SOCIAL WORK - NSDCVIVACCINE_GEN_ALL_CORE_FT
COVID-19, mRNA, LNP-S, PF, 30 mcg/0.3 mL dose (Pfizer); 20-Dec-2021 16:02; Cat Ramirez); Pf[employer] Inc; [id number] (Exp. Date: 30-Dec-2021); IntraMuscular; Deltoid Left.; 0.3 milliLiter(s);

## 2022-06-01 NOTE — PROGRESS NOTE ADULT - ASSESSMENT
79F HTN, DM2, COPD, Chronic Respiratory Failure on Trach to Vent admitted for Acute on Chronic Respiratory Failure.     Acute on Chronic Respiratory Failure / Septic Shock   Aspiration vs. VAP vs ; History of CRE and Psuedomonas; Remains Afebrile;   S/P IR Thoracentesis (L) 1500cc and all cultures are negative; Completed course of abx   Monitor fluid status   TTE- Normal LV, dilated RV, mod pulm htn, small pericardial effusion  ID and Pulmonary Input appreciated    Anemia of Chronic Disease with Iron Deficiency  Has been evaluated by GI and Hematology on prior admissions  She has Anemia of Chronic Disease but could also have intermittent GI Bleeding; Occult Blood Negative  S/P 2U PRBC Transfusion and IV Venofer  Multivitamin daily    Acute Renal Failure on CKD 3  Baseline Cr around 1.2  Will continue gentle hydration and maintain BP   Renally dose and monitor BMP and electrolytes     HTN  Hold all BP lower agents    DM2  FS and on ISS to maintain BS <180    Diet  Tube Feeds    DVT Prophylaxis  Heparin    Disposition  Full Code   Patient medically optimized for discharge home if cleared by PT and Ortho.

## 2022-06-01 NOTE — ED ADULT NURSE NOTE - OBJECTIVE STATEMENT
Pt was just discharged this morning from ICU, per , few hours after pt was discharged, pt was panting and grasping for air, "the nursing home cant take care of her, she might be looking good now, but that's false alarm" stated . Pt placed on vent with FIO2 of 100%, pt sating at 100 on RA without any respiratory distress. awaiting for MD orders

## 2022-06-01 NOTE — PROGRESS NOTE ADULT - ASSESSMENT
80 y/o F with PMH of HTN, DM type 2, Cardiac Arrest, CVA, COPD, Chronic Respiratory Failure Vent Dependant s/p trach & PEG, Multiple Hospital Admissions for Aspiration & vent associated PNA, COVID-19 Infection, Anemia with GI blood loss, GERD, and Advanced Dementia presented with acute respiratory distress.    ct chest reviewed  vs noted  labs reviewed  Ativan titration and dosing in progress  ID follow up noted    HCP Marciano -  - pt is full code  s/p emp ABX - broad spectrum for poss PNA  cvs rx regimen  bronchodilators  oral and skin care  assist with needs - ADL  monitor VS and HD  CT imaging reviewed  Old records reviewed  suction PRN  trach care  peg care  nutritional optimization  decubitus prevention

## 2022-06-01 NOTE — PROGRESS NOTE ADULT - SUBJECTIVE AND OBJECTIVE BOX
[facility] DIVISION of INFECTIOUS DISEASE  Arturo Olivas MD PhD, Haylee Umanzor MD, Andressa Brantley MD, Billy Valenzuela MD, Emil Medellin MD  and providing coverage with Eusebio Chairez MD  Providing Infectious Disease Consultations at [facility], [facility], [facility]'s    Office# [phone] to schedule follow up appointments  Answering Service for urgent calls or New Consults [phone]  Cell# to text for urgent issues Arturo Olivas [phone]     infectious diseases progress note:    BREA BECKHAM is a 76y y. o. Female patient    No concerning overnight events    Allergies    codeine (Hives)    Intolerances        ANTIBIOTICS/RELEVANT:  antimicrobials    immunologic:    OTHER:  acetaminophen    Suspension .. 650 milliGRAM(s) Oral every 6 hours PRN  albuterol/ipratropium for Nebulization 3 milliLiter(s) Nebulizer every 6 hours PRN  chlorhexidine 0.12% Liquid 15 milliLiter(s) Oral Mucosa every 12 hours  chlorhexidine 2% Cloths 1 Application(s) Topical <User Schedule>  chlorhexidine 4% Liquid 1 Application(s) Topical <User Schedule>  collagenase Ointment 1 Application(s) Topical daily  dextrose 5%. 1000 milliLiter(s) IV Continuous <Continuous>  dextrose 5%. 1000 milliLiter(s) IV Continuous <Continuous>  dextrose 50% Injectable 25 Gram(s) IV Push once  dextrose 50% Injectable 12.5 Gram(s) IV Push once  dextrose 50% Injectable 25 Gram(s) IV Push once  dextrose Oral Gel 15 Gram(s) Oral once PRN  folic acid 1 milliGRAM(s) Oral daily  glucagon  Injectable 1 milliGRAM(s) IntraMuscular once  heparin   Injectable 5000 Unit(s) SubCutaneous every 8 hours  insulin glargine Injectable (LANTUS) 8 Unit(s) SubCutaneous every morning  insulin lispro (ADMELOG) corrective regimen sliding scale   SubCutaneous every 6 hours  lactobacillus acidophilus 1 Tablet(s) Oral daily  LORazepam     Tablet 1 milliGRAM(s) Oral every 4 hours  LORazepam   Injectable 1 milliGRAM(s) IV Push every 6 hours PRN  mineral oil/petrolatum Hydrophilic Ointment 1 Application(s) Topical daily  multivitamin 1 Tablet(s) Oral daily  nystatin Powder 1 Application(s) Topical three times a day  pantoprazole  Injectable 40 milliGRAM(s) IV Push daily  povidone iodine 10% Solution 1 Application(s) Topical daily  sodium chloride 0.9% lock flush 10 milliLiter(s) IV Push every 1 hour PRN      Objective:  Vital Signs Last 24 Hrs  T(C): 37.1 (01 Jun 2022 08:45), Max: 37.6 (31 May 2022 18:00)  T(F): 98.7 (01 Jun 2022 08:45), Max: 99.6 (31 May 2022 18:00)  HR: 68 (01 Jun 2022 08:00) (63 - 86)  BP: 110/67 (01 Jun 2022 08:00) (89/55 - 126/61)  BP(mean): 82 (01 Jun 2022 08:00) (66 - 84)  RR: 26 (01 Jun 2022 08:00) (21 - 26)  SpO2: 99% (01 Jun 2022 08:00) (95% - 100%)    T(C): 37.1 (06-01-22 @ 08:45), Max: 37.7 (05-31-22 @ 10:03)  T(C): 37.1 (06-01-22 @ 08:45), Max: 37.7 (05-29-22 @ 12:10)  T(C): 37.1 (06-01-22 @ 08:45), Max: 37.7 (05-29-22 @ 12:10)    PHYSICAL EXAM:  HEENT: NC atraumatic  Neck: supple, remains on vent via trach  Respiratory: no accessory muscle use, breathing comfortably  Cardiovascular: distant  Gastrointestinal: normal appearing, nondistended  Extremities: no clubbing, no cyanosis,  Neuro: more alert today    Mode: AC/ CMV (Assist Control/ Continuous Mandatory Ventilation), RR (machine): 26, TV (machine): 400, FiO2: 35, PEEP: 5, ITime: 0.9, MAP: 18, PIP: 31    LABS:                          8.8    7.53  )-----------( 197      ( 01 Jun 2022 06:34 )             29.8       WBC  7.53 06-01 @ 06:34  7.50 05-31 @ 06:19  9.68 05-30 @ 06:28  11.87 05-29 @ 06:43  9.45 05-28 @ 07:05  11.79 05-27 @ 06:00  11.28 05-26 @ 06:04      06-01    141  |  109<H>  |  34<H>  ----------------------------<  204<H>  4.7   |  26  |  1.08    Ca    8.6      01 Jun 2022 06:34  Phos  2.7     05-31  Mg     2.4     05-31        Creatinine, Serum: 1.08 mg/dL (06-01-22 @ 06:34)  Creatinine, Serum: 1.07 mg/dL (05-31-22 @ 06:19)  Creatinine, Serum: 1.21 mg/dL (05-30-22 @ 06:28)  Creatinine, Serum: 1.13 mg/dL (05-29-22 @ 06:43)  Creatinine, Serum: 1.26 mg/dL (05-28-22 @ 07:05)  Creatinine, Serum: 1.32 mg/dL (05-27-22 @ 06:00)  Creatinine, Serum: 1.28 mg/dL (05-26-22 @ 06:04)                INFLAMMATORY MARKERS  Auto Neutrophil #: 5.79 K/uL (05-31-22 @ 06:19)  Auto Lymphocyte #: 0.76 K/uL (05-31-22 @ 06:19)  Auto Neutrophil #: 8.00 K/uL (05-28-22 @ 07:05)  Auto Lymphocyte #: 0.78 K/uL (05-28-22 @ 07:05)  Auto Lymphocyte #: 0.49 K/uL (05-26-22 @ 06:04)  Auto Neutrophil #: 10.06 K/uL (05-26-22 @ 06:04)  Auto Neutrophil #: 11.08 K/uL (05-23-22 @ 06:44)  Auto Lymphocyte #: 1.20 K/uL (05-23-22 @ 06:44)  Auto Neutrophil #: 10.55 K/uL (05-22-22 @ 06:23)  Auto Lymphocyte #: 1.67 K/uL (05-22-22 @ 06:23)  Auto Neutrophil #: 18.42 K/uL (05-21-22 @ 22:30)  Auto Lymphocyte #: 0.82 K/uL (05-21-22 @ 22:30)  Auto Neutrophil #: 20.65 K/uL (05-21-22 @ 20:28)  Auto Lymphocyte #: 1.84 K/uL (05-21-22 @ 20:28)    Lactate, Blood: 1.6 mmol/L (05-29-22 @ 06:43)  Lactate, Blood: 1.3 mmol/L (05-23-22 @ 06:44)  Lactate, Blood: 1.6 mmol/L (05-22-22 @ 15:05)  Lactate, Blood: 3.6 mmol/L (05-22-22 @ 10:01)  Lactate, Blood: 3.4 mmol/L (05-22-22 @ 06:23)  Lactate, Blood: 1.9 mmol/L (05-21-22 @ 20:28)    Auto Eosinophil #: 0.15 K/uL (05-31-22 @ 06:19)  Auto Eosinophil #: 0.06 K/uL (05-28-22 @ 07:05)  Auto Eosinophil #: 0.14 K/uL (05-26-22 @ 06:04)  Auto Eosinophil #: 0.12 K/uL (05-23-22 @ 06:44)  Auto Eosinophil #: 0.04 K/uL (05-22-22 @ 06:23)  Auto Eosinophil #: 0.09 K/uL (05-21-22 @ 22:30)  Auto Eosinophil #: 0.00 K/uL (05-21-22 @ 20:28)    Lactate Dehydrogenase, Serum: 381 U/L (05-24-22 @ 06:56)      Procalcitonin, Serum: 0.67 ng/mL (05-29-22 @ 06:43)  Procalcitonin, Serum: 1.40 ng/mL (05-23-22 @ 06:44)            Ferritin, Serum: 565 ng/mL (05-23-22 @ 11:49)        INR: 1.23 ratio (05-29-22 @ 06:43)  INR: 1.20 ratio (05-24-22 @ 06:56)  INR: 1.29 ratio (05-23-22 @ 06:44)  Activated Partial Thromboplastin Time: 34.5 sec (05-22-22 @ 01:22)  INR: 1.23 ratio (05-22-22 @ 01:22)  Activated Partial Thromboplastin Time: 37.4 sec (05-21-22 @ 20:28)  INR: 1.17 ratio (05-21-22 @ 20:28)          MICROBIOLOGY:              RADIOLOGY & ADDITIONAL STUDIES:

## 2022-06-01 NOTE — DISCHARGE NOTE NURSING/CASE MANAGEMENT/SOCIAL WORK - PATIENT PORTAL LINK FT
You can access the FollowMyHealth Patient Portal offered by [facility] by registering at the following website: [email or web address]. By joining [employer]’s FollowMyHealth portal, you will also be able to view your health information using other applications (apps) compatible with our system.

## 2022-06-01 NOTE — ED PROVIDER NOTE - CLINICAL SUMMARY MEDICAL DECISION MAKING FREE TEXT BOX
76 y.o. F vent dependent from [facility], was only discharged a few hours ago from [facility], per , nursing facility had issues setting the vent and pt was in respiratory distress, here in ED does not appear in distress, will d/w pulm/crit care attg

## 2022-06-01 NOTE — H&P ADULT - PROBLEM SELECTOR PLAN 7
was started on UFH 5000 units sub Q every 8h for DVT prophylaxis, also daily IVP Pantoprazole for GI prophylaxis. was started on UFH 5000 units sub Q every 8h for DVT prophylaxis, also daily IVP Pantoprazole for GI prophylaxis. Started patient on speciality bed with low air loss mattress given her quadriplegia.

## 2022-06-01 NOTE — H&P ADULT - PROBLEM SELECTOR PLAN 3
mild, expected to normalize with IVP furosemide, repeat in am. mild, expected to normalize with IVP Furosemide, repeat in am.

## 2022-06-01 NOTE — PROGRESS NOTE ADULT - REASON FOR ADMISSION
Acute respiratory distress.

## 2022-06-01 NOTE — ED ADULT NURSE NOTE - CHIEF COMPLAINT QUOTE
PT BIB EMS from [facility] for respiratory distress; vent dependent

## 2022-06-01 NOTE — H&P ADULT - PROBLEM SELECTOR PLAN 1
as evident from CXR & CT chest without contrast, with worsening pleural effusion, started on Lasix 40 mg IVP daily, daily body weight, monitor I & O & HD, Check TSH, cardiology consult with Dr. Arndt was called.

## 2022-06-01 NOTE — PROGRESS NOTE ADULT - ASSESSMENT
REVIEW OF SYMPTOMS      Able to give (reliable) ROS  NO     PHYSICAL EXAM    HEENT Unremarkable  atraumatic   RESP Fair air entry EXP prolonged    Harsh breath sound Resp distres mild   CARDIAC S1 S2 No S3     NO JVD    ABDOMEN SOFT BS PRESENT NOT DISTENDED No hepatosplenomegaly   PEDAL EDEMA present No calf tenderness  NO rash       AGE/SEX.   76 f    DOA.  5/21/2022     CC .  5/21/2022 AOC RESP FAILURE     PMH .  pmh Hosp stay given zosyn 4/21  pmh Pneumonia    pmh HTN,   pmh DM,   pmh CVA,   pmh  chronic respiratory failure   pmh s/p trach, PEG,   pmh cardiac arrest          MAIN ISSUES  .    SEVERE HYPOXIC RESP  FAILURE poa   VAP poa   SHOCK poa   BL PL EFFSNS   5/25/2022 Thoracentesis  CHF  PULM HYTN   ANEMIA 5/23/2022 Hb 6.8   ELEVATED LFTS     COVID/ICU/CODE STATUS.                       COVID  STATUS. 5/21/2022 scv2 (-)           ICU STAY. 5/21  GOC.  5/24/2022 full code    BEST PRACTICE ISSUES.                                                  HEAD OF BED ELEVATION. Yes  DVT PROPHYLAXIS.     5/21/2022 hpsc    SQUIRES PROPHYLAXIS.5/22/2022 protonix 40   INFECTION PROPHYLAXIS.   5/21/2022 Ch;lorhexidine .12%   5/21/2022 Chlorhexidine 2%                                                                                         DIET.  5/22 glucerna 1.5 1200 gt      VITALS/PO/IO/VENT/DRIPS.   6/1/2022 afeb 68 120/50   6/1/2022 ac 26/400/5/.35b     PROBLEM DATA/ASSESSMENT RECOMMENDATIONS (A/R).    HEMODYNAMICS.   Monitor and optimize bp   Target MAP to miguel  65 (+)    RESP.   Monitor and optimize  po   Target po to remain 90-95%    ABG.  5/23/2022 5a ac 26/400/10/70%  755/34/81   5/22/2022 5a vent 90% 744/41/117   5/21/2022 8p 100% vent 739/50/48    PMH/PSH PROBLEMS.  Management continued/modified as indicated      VENT MANAGEMENT.   HOB elevation  Target Pplat 30 (-)  Target PO 90-95%  Target pH 730 (+)  Daily spontaneous breathing trials   Daily sedation vacation       DIARRHEA.  5/23/2022 c diff (-)     PICC poa   5/23/2022 Ordered to dc picc    INFECTION SOURCE DD.   INFECTION A/R.   Has ho crp   Has ho iv abio within last 90 d  Is on bsab   id eval Dr BRITTANY Brantley appreciated   Started on ceftazidime avibactam 1.25x2 5/22/2022  completd 5/30/2022         INFECTION AUGUST.  W 5/21-5/22-5/23-5/24-5/25-5/26-5/28-5/29-5/31/2022  w 20 - 13 - 13-14 - 11-11.2 - 9.4 - 11.8- 7.5   pr 5/29/2022 .67   ua 5/21/2022 w 3-5   ct ch 5/21/2022 new bl ggo patchy cpnsolidatnand septal thickening of uncertain etiology   No change bl effsns l greater   Near complete consolidatn both lower loobes       SEVERE HYPOXIC RESP FAILURE poa.   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  V duplx 5/23/2022 (-)     COPD.  5/22/2022 duoneb     PLEURAL EFFSNS.  echo 5/30/2022 ef 65% pasp 60 dialted rv   echo 9/8/2021   n lvsf  mild dd  n rvsf  rvsp 51   ct 4/22/2022 ct ch 4/21/2022   bl effsns increased in size cw 1/2022 5/25/2022 1.5 l clear pl effsn removed left side IR  5/25/2022 g 183 l 144/381 .37 p 3.4/5.3 .64 p 23 l 36   5/25/2022l pl fluid  lymph pred exudate   cyto (-)         ANEMIA.   Hb 5/21-5/22-5/23-5/24-5/25-5/26-5/27-5/28-5/29-5/30-6/1/2022   Hb 8 - 7.2 - 6.8 - 8 - 7.8-7.5 - 8.4 - 8.3 - 8.4-7.7- 8.8     monitor  target hb 7 (+)     TRANSFUSION.  5/23/2022 1 u prbc    RYAN.  Na 5/21/2022 Na 140  CO2 5/21/2022 CO2 30   Cr 5/21-5/22-5/23-5/24-5/25-5/26-5/27-5/28-6/1/2022   Cr 1.6 - 1.5- 1.4 - 1.4-1.4 - 1.2 - 1.3 - 1.2- 1    monitor     ELEVATED LFTS.  LFTS 5/22-5/23-5/24-5/26/2022  - 136-124-120   - 104-47-23   - 149 - 105-65   5/22/2022 us abd fibrofatty liver dis borderline gbw thick tr perichole fluid   5/22/2022  hep panel (-)         TIME SPENT   Over 36  minutes aggregate critical care time spent on encounter; activities included   direct patient care, counseling and/or coordinating care reviewing notes, lab data/ imaging , discussion with multidisciplinary team/ patient  /family and explaining in detail risks, benefits, alternatives  of the recommendations     CHAPINCITO GUIDRY 76 f [facility] S 5/21/2022

## 2022-06-01 NOTE — H&P ADULT - PROBLEM SELECTOR PLAN 4
ML related to malnutrition with multiple comorbidities & recurrent frequent hospitalizations, nutritional services consult in am.

## 2022-06-01 NOTE — H&P ADULT - PROBLEM SELECTOR PLAN 2
ML 2ry to the above, no evidence suggestive of an infection process so far, improving, titrate vent settings, pulmonary/CC consult with Dr. Sandoval was called earlier by ED team.

## 2022-06-01 NOTE — DISCHARGE NOTE NURSING/CASE MANAGEMENT/SOCIAL WORK - NSDCPEFALRISK_GEN_ALL_CORE
For information on Fall & Injury Prevention, visit: [email or web address] OR  [email or web address] OR  https://www.cdc.gov/steadi/patient.html

## 2022-06-01 NOTE — H&P ADULT - NSHPPHYSICALEXAM_GEN_ALL_CORE
-    Vital Signs Last 24 Hrs  T(C): 36.3 (01 Jun 2022 23:45), Max: 37.1 (01 Jun 2022 03:42)  T(F): 97.4 (01 Jun 2022 23:45), Max: 98.7 (01 Jun 2022 03:42)  HR: 62 (02 Jun 2022 01:00) (60 - 81)  BP: 105/53 (02 Jun 2022 01:00) (101/61 - 135/78)  BP(mean): 70 (02 Jun 2022 01:00) (70 - 87)  RR: 21 (02 Jun 2022 01:00) (18 - 26)  SpO2: 100% (02 Jun 2022 01:00) (97% - 100%)          PHYSICAL EXAM:  		  GENERAL: Contracted, not in distress currently.  HEAD:  Atraumatic, Norm cephalic.  EYES: PERRLA, conjunctiva clear.  ENMT: no nasal discharge, MMM, unremarkable tracheostomy tube.   NECK: Supple, mild JVD.  NERVOUS SYSTEM: Quadriplegic & spastic, cerebral palsy posture.  CHEST/LUNG: Diminished air entry B/L middle & lower lung zones, (+) rales B/L, (+) conducted upper airway sounds, no rhonchi, or wheezing.  HEART: Normal S1 & acc S2, no murmurs, or extra sounds.  ABDOMEN: Soft, non-distended; bowel sounds present, no palpable masses or organomegaly, (+) PEG tube with unremarkable stoma.  EXTREMITIES:  No clubbing or cyanosis, (+) minimal soft pitting edema.  VASCULAR: 2+ radial, brachial pulses B/L.  SKIN: (+) stage 3 gluteal decubitus.  PSYCH: Calm, no agitation.

## 2022-06-01 NOTE — PROGRESS NOTE ADULT - SUBJECTIVE AND OBJECTIVE BOX
Chief Complaint: Respiratory distress    Interval Events: No events overnight.    Review of Systems:  Unable to obtain    Physical Exam:  Vital Signs Last 24 Hrs  T(C): 37.1 (01 Jun 2022 08:45), Max: 37.7 (31 May 2022 10:03)  T(F): 98.7 (01 Jun 2022 08:45), Max: 99.9 (31 May 2022 10:03)  HR: 77 (01 Jun 2022 06:46) (63 - 88)  BP: 112/62 (01 Jun 2022 06:00) (89/55 - 180/73)  BP(mean): 79 (01 Jun 2022 06:00) (66 - 102)  RR: 26 (01 Jun 2022 06:00) (21 - 30)  SpO2: 97% (01 Jun 2022 06:46) (95% - 100%)  General: NAD  HEENT: Trach  Neck: No JVD, no carotid bruit  Lungs: Coarse bilaterally  CV: RRR, nl S1/S2, no M/R/G  Abdomen: S/NT/ND, +BS  Extremities: No LE edema, no cyanosis  Neuro: AAOx0  Skin: No rash    Labs:    06-01    141  |  109<H>  |  34<H>  ----------------------------<  204<H>  4.7   |  26  |  1.08    Ca    8.6      01 Jun 2022 06:34  Phos  2.7     05-31  Mg     2.4     05-31                          8.8    7.53  )-----------( 197      ( 01 Jun 2022 06:34 )             29.8       Telemetry: Sinus rhythm

## 2022-06-01 NOTE — CONSULT NOTE ADULT - ASSESSMENT
79F HTN, DM2, COPD, Chronic Respiratory Failure on Trach to Vent admitted just discharged earlier today at 2pm now back at 7pm for respiratory distress and tachypnea,    1. Acute hypoxic resp failure   2. Chronic resp failure   3. B/l pleural effusions  4. CHF    Plan  Neuro: Restart home ativan PO and IV prn for agitation and vent compliance   Cardio: HD stable, check trop, BNP, lactate   Pulm: Ct chest with worsening b/l pleural effusions. Will dose 40mg lasix stat. Unclear if this was cause of hypoxia earlier today or if it was 2/2 agitation and her "bearing down.". Vent bundle in place . Increase RR to 24 from 18 based on ABG of 7.47/36/261/26. Decrease fi02 from 100% to 60% and as tolerated to sp02 above 92%. Pt recently had thoracentesis now with reaccumulation of effusion. She did receive blood products the other day. Pleurx?   GI: restart tube feeds, PPI  Renal: strict I/Os, renally dose meds prns  ID: no obvious source of infx, recently finished prolonged course of abx a few days ago during prior hospital admission . monitor off for now ., afebrile, no WBC   Heme: sq heparin +SCDs for dvt ppx  Endo: ISS q6, Lantus 8 units in morning   Pt has extensive wounds in sacral region and DTIs to toes and feet. lotions and powders have been reordered.     Dispo: Admit to ICU, full code   79F HTN, DM2, COPD, Chronic Respiratory Failure on Trach to Vent admitted just discharged earlier today at 2pm now back at 7pm for respiratory distress and tachypnea,    1. Acute hypoxic resp failure   2. Chronic resp failure   3. B/l pleural effusions  4. CHF  5. Hyperkalemia     Plan  Neuro: Restart home ativan PO and IV prn for agitation and vent compliance   Cardio: HD stable, check trop, BNP, lactate   Pulm: Ct chest with worsening b/l pleural effusions. Will dose 40mg lasix stat. Unclear if this was cause of hypoxia earlier today or if it was 2/2 agitation and her "bearing down.". Vent bundle in place . Increase RR to 24 from 18 based on ABG of 7.47/36/261/26. Decrease fi02 from 100% to 60% and as tolerated to sp02 above 92%. Pt recently had thoracentesis now with reaccumulation of effusion. She did receive blood products the other day. Pleurx?   GI: restart tube feeds, PPI  Renal: Hyperkalemia to improve with lasix,  strict I/Os, renally dose meds prns  ID: no obvious source of infx, recently finished prolonged course of abx a few days ago during prior hospital admission . monitor off for now ., afebrile, no WBC   Heme: sq heparin +SCDs for dvt ppx  Endo: ISS q6, Lantus 8 units in morning   Pt has extensive wounds in sacral region and DTIs to toes and feet. lotions and powders have been reordered.     Dispo: Admit to ICU, full code

## 2022-06-01 NOTE — PROGRESS NOTE ADULT - ASSESSMENT
This is an 80 y/o F with PMH of HTN, DM type 2, Cardiac Arrest, CVA, COPD, Chronic Respiratory Failure Vent Dependant s/p trach & PEG, Multiple Hospital Admissions for Aspiration & vent associated PNA, COVID-19 Infection, Anemia with GI blood loss, GERD, and Advanced Dementia who was sent from [facility] facility for acute respiratory distress.     Sepsis 2/2 VAP/PNA c/b pleural effusions  Pleural Effusions: parapneumonic vs. empyema  - prior sputum 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  - s/p IR guided thoracentesis on 5/25    --pleural fluid cx NGTD  - repeat 5/25 CT chest w/ improved aeration  Plan:  s/p avycaz  x7 day completed 5/28  S/p linezolid  C/w vent management per ICU  C/w monitoring off Abx    Thank you for consulting us and involving us in the management of this most interesting and challenging case.  We will follow along in the care of this patient. Please call us at [phone] or text me directly on my cell# at [phone] with any concerns.

## 2022-06-01 NOTE — ED PROVIDER NOTE - COVID-19  TEST TYPE
"[facility]  [phone]          Assessment/Recommendations   Patient with known history of atrial flutter, status post flutter ablation in 2018 without recurrence.  He also has some mild elevation in calcium score but is been intolerant of statins and his insurance will not pay for PCSK9 inhibitors.  His LDL cholesterol is not at goal.    He exercises on a regular basis, generally 3-5 times a week.  We will try him on Zetia 10 mg a day and repeat a lipid panel in about 3 months.    Encouraged him to continue his exercise routine and to call if he has changes in his functional capacity.    He remains stable we will see him back in a year and a half.    Thank you for allowing us to participate in his care.       History of Present Illness/Subjective    Mr. Juan Carlos Huang is a 65 year old male with known elevated calcium score, and atrial flutter status post flutter ablation.  His fibrillation is in 2018 and he is not had recurrence.  He maintains an active lifestyle.  He lifts weights 3 times a week and walks on the other 2 days.  He has not had any chest discomfort, unusual shortness of breath with activity, orthopnea, paroxysmal nocturnal dyspnea, peripheral edema, syncope, near syncope or palpitations.  LDL cholesterol is not at goal and he does not tolerate statins other than insurance company will not pay for PCSK9 inhibitors.       Physical Examination Review of Systems   BP (!) 144/70 (BP Location: Right arm, Patient Position: Sitting, Cuff Size: Adult Large)   Pulse 75   Resp 18   Ht 1.778 m (5' 10\")   Wt 102.3 kg (225 lb 9.6 oz)   BMI 32.37 kg/m    Body mass index is 32.37 kg/m .  Wt Readings from Last 3 Encounters:   04/21/22 102.3 kg (225 lb 9.6 oz)   10/21/20 101.2 kg (223 lb)   10/25/19 100.4 kg (221 lb 6.4 oz)     General Appearance:   Alert, cooperative and in no acute distress.   ENT/Mouth: Patient wearing a mask.      EYES:  no scleral icterus, normal conjunctivae   Neck: " "JVP normal. No Hepatojugular reflux. Thyroid not visualized.   Chest/Lungs:   Lungs are clear to auscultation, equal chest wall expansion.   Cardiovascular:   S1, S2 without murmur ,clicks or rubs. Brachial, radial and posterior tibial pulses are intact and symetric. No carotid bruits noted   Abdomen:  Nontender. BS+.    Extremities: No cyanosis, clubbing or edema   Skin: no xanthelasma, warm.    Neurologic: normal arm movement bilateral, no tremors     Psychiatric: Appropriate affect.      Enc Vitals  BP: (!) 144/70  Pulse: 75  Resp: 18  Weight: 102.3 kg (225 lb 9.6 oz)  Height: 177.8 cm (5' 10\")                                           Medical History  Surgical History Family History Social History   Past Medical History:   Diagnosis Date     Atrial flutter (H) 12/2015    new atrial flutter 12/2015     Heartburn unknown     History of cardioversion 12/2015     HLD (hyperlipidemia) 12/13/2015     HTN (hypertension) 12/13/2015     Pre-diabetes unknown     Status post catheter ablation of atrial flutter 6/26/2018    RA CTI ablation line March 23, 2018    Past Surgical History:   Procedure Laterality Date     ARTHROSCOPY SHOULDER ROTATOR CUFF REPAIR Bilateral      CARDIOVERSION  12/15     EP ABLATION AFLUTTER Right 3/23/2018    Procedure: EP Ablation Atrial Flutter;  Surgeon: Gustavo Padron MD;  Location: [facility];  Service:      KNEE SURGERY Bilateral     arthoscopic and open, ?meniscal repair     LAPAROTOMY EXPLORATORY      x 2     OTHER SURGICAL HISTORY  01/08/2018    right total hip     RELEASE CARPAL TUNNEL Bilateral     Family History   Problem Relation Age of Onset     Diabetes Type 2  Mother      Kidney Cancer Mother 86.00     Other - See Comments Father 57.00        MVA     Dyslipidemia Brother      No Known Problems Sister      Chronic Obstructive Pulmonary Disease Sister 60.00     Liver Disease Sister 58.00     Diabetes Type 2  Brother      Hepatitis Brother      Alcoholism Brother 50.00    " Social History     Socioeconomic History     Marital status:      Spouse name: Not on file     Number of children: 2     Years of education: Not on file     Highest education level: Not on file   Occupational History     Not on file   Tobacco Use     Smoking status: Never Smoker     Smokeless tobacco: Never Used   Substance and Sexual Activity     Alcohol use: Yes     Comment: Alcoholic Drinks/day: 1-2 cans of beer per day     Drug use: No     Sexual activity: Never   Other Topics Concern     Not on file   Social History Narrative     Not on file     Social Determinants of Health     Financial Resource Strain: Not on file   Food Insecurity: Not on file   Transportation Needs: Not on file   Physical Activity: Not on file   Stress: Not on file   Social Connections: Not on file   Intimate Partner Violence: Not on file   Housing Stability: Not on file          Medications  Allergies   Current Outpatient Medications   Medication Sig Dispense Refill     aspirin 81 MG EC tablet [ASPIRIN 81 MG EC TABLET] Take 81 mg by mouth daily.       cholecalciferol, vitamin D3, 5,000 unit Tab [CHOLECALCIFEROL, VITAMIN D3, 5,000 UNIT TAB] Take 5,000 Units by mouth daily.       ezetimibe (ZETIA) 10 MG tablet Take 1 tablet (10 mg) by mouth daily 90 tablet 3     metoprolol succinate ER (TOPROL-XL) 50 MG 24 hr tablet Take 1 tablet (50 mg) by mouth daily 90 tablet 0     multivitamin therapeutic tablet [MULTIVITAMIN THERAPEUTIC TABLET] Take 1 tablet by mouth daily.       ranitidine (ZANTAC) 150 MG tablet [RANITIDINE (ZANTAC) 150 MG TABLET] Take 150 mg by mouth as needed.      Allergies   Allergen Reactions     Atorvastatin Muscle Pain (Myalgia)     Statins-Hmg-Coa Reductase Inhibitors [Hmg-Coa-R Inhibitors] Unknown     Other reaction(s): Myalgias, Statin intolerance         Lab Results    Chemistry/lipid CBC Cardiac Enzymes/BNP/TSH/INR   Lab Results   Component Value Date    CHOL 200 (H) 10/10/2019    HDL 56 10/10/2019    TRIG 137  10/10/2019    BUN 19 03/23/2018     03/23/2018    CO2 29 03/23/2018    Lab Results   Component Value Date    WBC 6.6 03/23/2018    HGB 12.8 (L) 03/23/2018    HCT 39.2 (L) 03/23/2018    MCV 91 03/23/2018     03/23/2018    No results found for: CKTOTAL, CKMB, TROPONINI, BNP, TSH, INR                                         MOLECULAR PCR

## 2022-06-01 NOTE — H&P ADULT - ASSESSMENT
78 y/o F with PMH of HTN, DM type 2, Cardiac Arrest, Core Pulmonale, CVA, COPD, Chronic Respiratory Failure Vent Dependant s/p trach & PEG, COVID-19 Infection, CKD, Anemia, GERD, and Severe Dementia sent back for acute respiratory distress.

## 2022-06-01 NOTE — PROGRESS NOTE ADULT - SUBJECTIVE AND OBJECTIVE BOX
Date/Time Patient Seen:  		  Referring MD:   Data Reviewed	       Patient is a 76y old  Female who presents with a chief complaint of Acute respiratory distress. (01 Jun 2022 06:31)      Subjective/HPI     PAST MEDICAL & SURGICAL HISTORY:  Dementia of frontal lobe type    Aphasic stroke    Diabetes mellitus    Respiratory failure    Hypertension    GERD (gastroesophageal reflux disease)    Constipation    Respiratory failure    CVA (cerebral vascular accident)    HTN (hypertension)    DM (diabetes mellitus)    Advanced dementia    COVID-19 virus detected    Quadriplegia    Pneumonia    Type II diabetes mellitus    Hx of appendectomy    Gastrostomy in place    Tracheostomy in place    Tracheostomy tube present    Feeding by G-tube          Medication list         MEDICATIONS  (STANDING):  chlorhexidine 0.12% Liquid 15 milliLiter(s) Oral Mucosa every 12 hours  chlorhexidine 2% Cloths 1 Application(s) Topical <User Schedule>  chlorhexidine 4% Liquid 1 Application(s) Topical <User Schedule>  collagenase Ointment 1 Application(s) Topical daily  dextrose 5%. 1000 milliLiter(s) (100 mL/Hr) IV Continuous <Continuous>  dextrose 5%. 1000 milliLiter(s) (50 mL/Hr) IV Continuous <Continuous>  dextrose 50% Injectable 25 Gram(s) IV Push once  dextrose 50% Injectable 12.5 Gram(s) IV Push once  dextrose 50% Injectable 25 Gram(s) IV Push once  folic acid 1 milliGRAM(s) Oral daily  glucagon  Injectable 1 milliGRAM(s) IntraMuscular once  heparin   Injectable 5000 Unit(s) SubCutaneous every 8 hours  insulin glargine Injectable (LANTUS) 5 Unit(s) SubCutaneous every morning  insulin lispro (ADMELOG) corrective regimen sliding scale   SubCutaneous every 6 hours  lactobacillus acidophilus 1 Tablet(s) Oral daily  LORazepam     Tablet 1 milliGRAM(s) Oral every 4 hours  mineral oil/petrolatum Hydrophilic Ointment 1 Application(s) Topical daily  multivitamin 1 Tablet(s) Oral daily  nystatin Powder 1 Application(s) Topical three times a day  pantoprazole  Injectable 40 milliGRAM(s) IV Push daily  povidone iodine 10% Solution 1 Application(s) Topical daily    MEDICATIONS  (PRN):  acetaminophen    Suspension .. 650 milliGRAM(s) Oral every 6 hours PRN Temp greater or equal to 38C (100.4F), Mild Pain (1 - 3)  albuterol/ipratropium for Nebulization 3 milliLiter(s) Nebulizer every 6 hours PRN Shortness of Breath and/or Wheezing  dextrose Oral Gel 15 Gram(s) Oral once PRN Blood Glucose LESS THAN 70 milliGRAM(s)/deciliter  LORazepam   Injectable 1 milliGRAM(s) IV Push every 6 hours PRN agitation/vent dyssynchrony  sodium chloride 0.9% lock flush 10 milliLiter(s) IV Push every 1 hour PRN Pre/post blood products, medications, blood draw, and to maintain line patency         Vitals log        ICU Vital Signs Last 24 Hrs  T(C): 37.1 (01 Jun 2022 03:42), Max: 37.7 (31 May 2022 10:03)  T(F): 98.7 (01 Jun 2022 03:42), Max: 99.9 (31 May 2022 10:03)  HR: 65 (01 Jun 2022 06:00) (63 - 88)  BP: 112/62 (01 Jun 2022 06:00) (89/55 - 180/73)  BP(mean): 79 (01 Jun 2022 06:00) (66 - 102)  ABP: --  ABP(mean): --  RR: 26 (01 Jun 2022 06:00) (21 - 30)  SpO2: 97% (01 Jun 2022 06:00) (95% - 100%)       Mode: AC/ CMV (Assist Control/ Continuous Mandatory Ventilation)  RR (machine): 26  TV (machine): 400  FiO2: 35  PEEP: 5  ITime: 0.9  MAP: 15  PIP: 36      Input and Output:  I&O's Detail    30 May 2022 07:01  -  31 May 2022 07:00  --------------------------------------------------------  IN:    Free Water: 350 mL    Glucerna 1.5: 1380 mL  Total IN: 1730 mL    OUT:    Voided (mL): 650 mL  Total OUT: 650 mL    Total NET: 1080 mL      31 May 2022 07:01  -  01 Jun 2022 06:37  --------------------------------------------------------  IN:    Free Water: 600 mL    Glucerna 1.5: 1440 mL  Total IN: 2040 mL    OUT:    Voided (mL): 1000 mL  Total OUT: 1000 mL    Total NET: 1040 mL          Lab Data                        8.8    7.53  )-----------( 197      ( 01 Jun 2022 06:34 )             29.8     05-31    140  |  110<H>  |  32<H>  ----------------------------<  209<H>  5.0   |  26  |  1.07    Ca    8.4      31 May 2022 06:19  Phos  2.7     05-31  Mg     2.4     05-31              Review of Systems	      Objective     Physical Examination    heart s1s2  lung dc BS  abd soft      Pertinent Lab findings & Imaging      Annalise:  NO   Adequate UO     I&O's Detail    30 May 2022 07:01  -  31 May 2022 07:00  --------------------------------------------------------  IN:    Free Water: 350 mL    Glucerna 1.5: 1380 mL  Total IN: 1730 mL    OUT:    Voided (mL): 650 mL  Total OUT: 650 mL    Total NET: 1080 mL      31 May 2022 07:01  -  01 Jun 2022 06:37  --------------------------------------------------------  IN:    Free Water: 600 mL    Glucerna 1.5: 1440 mL  Total IN: 2040 mL    OUT:    Voided (mL): 1000 mL  Total OUT: 1000 mL    Total NET: 1040 mL               Discussed with:     Cultures:	        Radiology

## 2022-06-01 NOTE — PROGRESS NOTE ADULT - SUBJECTIVE AND OBJECTIVE BOX
Subjective: Patient seen and examined.  No overnight events.    MEDICATIONS  (STANDING):  chlorhexidine 0.12% Liquid 15 milliLiter(s) Oral Mucosa every 12 hours  chlorhexidine 2% Cloths 1 Application(s) Topical <User Schedule>  chlorhexidine 4% Liquid 1 Application(s) Topical <User Schedule>  collagenase Ointment 1 Application(s) Topical daily  dextrose 5%. 1000 milliLiter(s) (100 mL/Hr) IV Continuous <Continuous>  dextrose 5%. 1000 milliLiter(s) (50 mL/Hr) IV Continuous <Continuous>  dextrose 50% Injectable 25 Gram(s) IV Push once  dextrose 50% Injectable 12.5 Gram(s) IV Push once  dextrose 50% Injectable 25 Gram(s) IV Push once  folic acid 1 milliGRAM(s) Oral daily  glucagon  Injectable 1 milliGRAM(s) IntraMuscular once  heparin   Injectable 5000 Unit(s) SubCutaneous every 8 hours  insulin glargine Injectable (LANTUS) 3 Unit(s) SubCutaneous once  insulin glargine Injectable (LANTUS) 8 Unit(s) SubCutaneous every morning  insulin lispro (ADMELOG) corrective regimen sliding scale   SubCutaneous every 6 hours  lactobacillus acidophilus 1 Tablet(s) Oral daily  LORazepam     Tablet 1 milliGRAM(s) Oral every 4 hours  mineral oil/petrolatum Hydrophilic Ointment 1 Application(s) Topical daily  multivitamin 1 Tablet(s) Oral daily  nystatin Powder 1 Application(s) Topical three times a day  pantoprazole  Injectable 40 milliGRAM(s) IV Push daily  povidone iodine 10% Solution 1 Application(s) Topical daily    MEDICATIONS  (PRN):  acetaminophen    Suspension .. 650 milliGRAM(s) Oral every 6 hours PRN Temp greater or equal to 38C (100.4F), Mild Pain (1 - 3)  albuterol/ipratropium for Nebulization 3 milliLiter(s) Nebulizer every 6 hours PRN Shortness of Breath and/or Wheezing  dextrose Oral Gel 15 Gram(s) Oral once PRN Blood Glucose LESS THAN 70 milliGRAM(s)/deciliter  LORazepam   Injectable 1 milliGRAM(s) IV Push every 6 hours PRN agitation/vent dyssynchrony  sodium chloride 0.9% lock flush 10 milliLiter(s) IV Push every 1 hour PRN Pre/post blood products, medications, blood draw, and to maintain line patency      Allergies    codeine (Hives)    Intolerances        Vital Signs Last 24 Hrs  T(C): 37.1 (01 Jun 2022 03:42), Max: 37.7 (31 May 2022 10:03)  T(F): 98.7 (01 Jun 2022 03:42), Max: 99.9 (31 May 2022 10:03)  HR: 77 (01 Jun 2022 06:46) (63 - 88)  BP: 112/62 (01 Jun 2022 06:00) (89/55 - 180/73)  BP(mean): 79 (01 Jun 2022 06:00) (66 - 102)  RR: 26 (01 Jun 2022 06:00) (21 - 30)  SpO2: 97% (01 Jun 2022 06:46) (95% - 100%)    PHYSICAL EXAM:  GENERAL: chronically ill appearing, emaciated, NAD, obtunded  HEAD:  atraumatic  EYES: conjunctiva clear  NECK: (+)trach in place, clean  RESPIRATORY: mechanical breath sounds, vent in place, no gross crackles or rales  CARDIOVASCULAR:  regular rate and rhythm, no murmurs or rubs or gallops, 2+ peripheral pulses  GASTROINTESTINAL:  soft, +PEG in place, clean and dry as well, nondistended, bowel sounds present  EXTREMITIES: no clubbing or cyanosis or edema  MUSCULOSKELETAL:  (+)diffuse contractures in all joints  NERVOUS SYSTEM: does not respond to pain      LABS:                        8.8    7.53  )-----------( 197      ( 01 Jun 2022 06:34 )             29.8     01 Jun 2022 06:34    141    |  109    |  34     ----------------------------<  204    4.7     |  26     |  1.08     Ca    8.6        01 Jun 2022 06:34          CAPILLARY BLOOD GLUCOSE      POCT Blood Glucose.: 196 mg/dL (01 Jun 2022 05:07)  POCT Blood Glucose.: 177 mg/dL (31 May 2022 23:17)  POCT Blood Glucose.: 157 mg/dL (31 May 2022 17:44)  POCT Blood Glucose.: 202 mg/dL (31 May 2022 11:39)      RADIOLOGY & ADDITIONAL TESTS:    Imaging Personally Reviewed:  [ ] YES     Consultant(s) Notes Reviewed:      Care Discussed with Consultants/Other Providers:    Advanced Directives: [ ] DNR  [ ] No feeding tube  [ ] MOLST in chart  [ ] MOLST completed today  [ ] Unknown

## 2022-06-02 DIAGNOSIS — E88.09 OTHER DISORDERS OF PLASMA-PROTEIN METABOLISM, NOT ELSEWHERE CLASSIFIED: ICD-10-CM

## 2022-06-02 DIAGNOSIS — I50.9 HEART FAILURE, UNSPECIFIED: ICD-10-CM

## 2022-06-02 DIAGNOSIS — Z29.9 ENCOUNTER FOR PROPHYLACTIC MEASURES, UNSPECIFIED: ICD-10-CM

## 2022-06-02 DIAGNOSIS — D64.9 ANEMIA, UNSPECIFIED: ICD-10-CM

## 2022-06-02 DIAGNOSIS — J96.21 ACUTE AND CHRONIC RESPIRATORY FAILURE WITH HYPOXIA: ICD-10-CM

## 2022-06-02 DIAGNOSIS — E11.9 TYPE 2 DIABETES MELLITUS WITHOUT COMPLICATIONS: ICD-10-CM

## 2022-06-02 DIAGNOSIS — E87.5 HYPERKALEMIA: ICD-10-CM

## 2022-06-02 LAB
ANION GAP SERPL CALC-SCNC: 8 MMOL/L — SIGNIFICANT CHANGE UP (ref 5–17)
BUN SERPL-MCNC: 37 MG/DL — HIGH (ref 7–23)
CALCIUM SERPL-MCNC: 8.7 MG/DL — SIGNIFICANT CHANGE UP (ref 8.4–10.5)
CHLORIDE SERPL-SCNC: 110 MMOL/L — HIGH (ref 96–108)
CO2 SERPL-SCNC: 27 MMOL/L — SIGNIFICANT CHANGE UP (ref 22–31)
CREAT SERPL-MCNC: 1.15 MG/DL — SIGNIFICANT CHANGE UP (ref 0.5–1.3)
EGFR: 49 ML/MIN/1.73M2 — LOW
GLUCOSE SERPL-MCNC: 205 MG/DL — HIGH (ref 70–99)
GRAM STN FLD: SIGNIFICANT CHANGE UP
HCT VFR BLD CALC: 35.7 % — SIGNIFICANT CHANGE UP (ref 34.5–45)
HGB BLD-MCNC: 10.6 G/DL — LOW (ref 11.5–15.5)
MAGNESIUM SERPL-MCNC: 2.7 MG/DL — HIGH (ref 1.6–2.6)
MCHC RBC-ENTMCNC: 26.5 PG — LOW (ref 27–34)
MCHC RBC-ENTMCNC: 29.7 GM/DL — LOW (ref 32–36)
MCV RBC AUTO: 89.3 FL — SIGNIFICANT CHANGE UP (ref 80–100)
NRBC # BLD: 0 /100 WBCS — SIGNIFICANT CHANGE UP (ref 0–0)
PHOSPHATE SERPL-MCNC: 5.2 MG/DL — HIGH (ref 2.5–4.5)
PLATELET # BLD AUTO: 220 K/UL — SIGNIFICANT CHANGE UP (ref 150–400)
POTASSIUM SERPL-MCNC: 4.9 MMOL/L — SIGNIFICANT CHANGE UP (ref 3.5–5.3)
POTASSIUM SERPL-SCNC: 4.9 MMOL/L — SIGNIFICANT CHANGE UP (ref 3.5–5.3)
RAPID RVP RESULT: SIGNIFICANT CHANGE UP
RBC # BLD: 4 M/UL — SIGNIFICANT CHANGE UP (ref 3.8–5.2)
RBC # FLD: 21.1 % — HIGH (ref 10.3–14.5)
SARS-COV-2 RNA SPEC QL NAA+PROBE: SIGNIFICANT CHANGE UP
SODIUM SERPL-SCNC: 145 MMOL/L — SIGNIFICANT CHANGE UP (ref 135–145)
SPECIMEN SOURCE: SIGNIFICANT CHANGE UP
TSH SERPL-MCNC: 3.03 UIU/ML — SIGNIFICANT CHANGE UP (ref 0.27–4.2)
WBC # BLD: 7.59 K/UL — SIGNIFICANT CHANGE UP (ref 3.8–10.5)
WBC # FLD AUTO: 7.59 K/UL — SIGNIFICANT CHANGE UP (ref 3.8–10.5)

## 2022-06-02 PROCEDURE — 99233 SBSQ HOSP IP/OBS HIGH 50: CPT

## 2022-06-02 RX ORDER — SIMETHICONE 80 MG/1
80 TABLET, CHEWABLE ORAL EVERY 6 HOURS
Refills: 0 | Status: DISCONTINUED | OUTPATIENT
Start: 2022-06-02 | End: 2022-06-10

## 2022-06-02 RX ORDER — SENNA PLUS 8.6 MG/1
3 TABLET ORAL AT BEDTIME
Refills: 0 | Status: DISCONTINUED | OUTPATIENT
Start: 2022-06-02 | End: 2022-06-10

## 2022-06-02 RX ORDER — POLYETHYLENE GLYCOL 3350 17 G/17G
17 POWDER, FOR SOLUTION ORAL EVERY 12 HOURS
Refills: 0 | Status: DISCONTINUED | OUTPATIENT
Start: 2022-06-02 | End: 2022-06-02

## 2022-06-02 RX ORDER — FUROSEMIDE 40 MG
40 TABLET ORAL DAILY
Refills: 0 | Status: DISCONTINUED | OUTPATIENT
Start: 2022-06-02 | End: 2022-06-04

## 2022-06-02 RX ORDER — METHOCARBAMOL 500 MG/1
500 TABLET, FILM COATED ORAL
Refills: 0 | Status: DISCONTINUED | OUTPATIENT
Start: 2022-06-02 | End: 2022-06-10

## 2022-06-02 RX ADMIN — METHOCARBAMOL 500 MILLIGRAM(S): 500 TABLET, FILM COATED ORAL at 05:18

## 2022-06-02 RX ADMIN — Medication 1 TABLET(S): at 11:55

## 2022-06-02 RX ADMIN — Medication 2: at 18:05

## 2022-06-02 RX ADMIN — Medication 2: at 05:21

## 2022-06-02 RX ADMIN — PANTOPRAZOLE SODIUM 40 MILLIGRAM(S): 20 TABLET, DELAYED RELEASE ORAL at 11:54

## 2022-06-02 RX ADMIN — HEPARIN SODIUM 5000 UNIT(S): 5000 INJECTION INTRAVENOUS; SUBCUTANEOUS at 21:43

## 2022-06-02 RX ADMIN — SIMETHICONE 80 MILLIGRAM(S): 80 TABLET, CHEWABLE ORAL at 18:40

## 2022-06-02 RX ADMIN — Medication 2 MILLIGRAM(S): at 14:42

## 2022-06-02 RX ADMIN — Medication 1 MILLIGRAM(S): at 05:18

## 2022-06-02 RX ADMIN — Medication 40 MILLIGRAM(S): at 00:01

## 2022-06-02 RX ADMIN — Medication 2 MILLIGRAM(S): at 11:04

## 2022-06-02 RX ADMIN — Medication 1 TABLET(S): at 12:06

## 2022-06-02 RX ADMIN — HEPARIN SODIUM 5000 UNIT(S): 5000 INJECTION INTRAVENOUS; SUBCUTANEOUS at 08:37

## 2022-06-02 RX ADMIN — CHLORHEXIDINE GLUCONATE 15 MILLILITER(S): 213 SOLUTION TOPICAL at 18:04

## 2022-06-02 RX ADMIN — Medication 2 MILLIGRAM(S): at 18:04

## 2022-06-02 RX ADMIN — Medication 1 MILLIGRAM(S): at 01:57

## 2022-06-02 RX ADMIN — Medication 1 APPLICATION(S): at 11:56

## 2022-06-02 RX ADMIN — INSULIN GLARGINE 8 UNIT(S): 100 INJECTION, SOLUTION SUBCUTANEOUS at 09:49

## 2022-06-02 RX ADMIN — HEPARIN SODIUM 5000 UNIT(S): 5000 INJECTION INTRAVENOUS; SUBCUTANEOUS at 00:01

## 2022-06-02 RX ADMIN — Medication 1 APPLICATION(S): at 00:01

## 2022-06-02 RX ADMIN — Medication 2 MILLIGRAM(S): at 06:44

## 2022-06-02 RX ADMIN — Medication 1 MILLIGRAM(S): at 11:55

## 2022-06-02 RX ADMIN — Medication 2 MILLIGRAM(S): at 21:43

## 2022-06-02 RX ADMIN — SENNA PLUS 3 TABLET(S): 8.6 TABLET ORAL at 21:43

## 2022-06-02 RX ADMIN — HEPARIN SODIUM 5000 UNIT(S): 5000 INJECTION INTRAVENOUS; SUBCUTANEOUS at 14:43

## 2022-06-02 RX ADMIN — Medication 1 APPLICATION(S): at 11:55

## 2022-06-02 RX ADMIN — NYSTATIN CREAM 1 APPLICATION(S): 100000 CREAM TOPICAL at 18:03

## 2022-06-02 RX ADMIN — METHOCARBAMOL 500 MILLIGRAM(S): 500 TABLET, FILM COATED ORAL at 18:04

## 2022-06-02 RX ADMIN — Medication 2: at 11:57

## 2022-06-02 RX ADMIN — NYSTATIN CREAM 1 APPLICATION(S): 100000 CREAM TOPICAL at 05:19

## 2022-06-02 RX ADMIN — Medication 40 MILLIGRAM(S): at 09:42

## 2022-06-02 RX ADMIN — Medication 1 MILLIGRAM(S): at 09:42

## 2022-06-02 RX ADMIN — SIMETHICONE 80 MILLIGRAM(S): 80 TABLET, CHEWABLE ORAL at 05:18

## 2022-06-02 RX ADMIN — Medication 2 MILLIGRAM(S): at 16:02

## 2022-06-02 RX ADMIN — SIMETHICONE 80 MILLIGRAM(S): 80 TABLET, CHEWABLE ORAL at 11:55

## 2022-06-02 RX ADMIN — CHLORHEXIDINE GLUCONATE 15 MILLILITER(S): 213 SOLUTION TOPICAL at 05:18

## 2022-06-02 NOTE — PATIENT PROFILE ADULT - FALL HARM RISK - HARM RISK INTERVENTIONS
Assistance with ambulation/Assistance OOB with selected safe patient handling equipment/Communicate Risk of Fall with Harm to all staff/Discuss with provider need for PT consult/Monitor gait and stability/Reinforce activity limits and safety measures with patient and family/Tailored Fall Risk Interventions/Visual Cue: Yellow wristband and red socks/Bed in lowest position, wheels locked, appropriate side rails in place/Call bell, personal items and telephone in reach/Instruct patient to call for assistance before getting out of bed or chair/Non-slip footwear when patient is out of bed/[place] to call system/Physically safe environment - no spills, clutter or unnecessary equipment/Purposeful Proactive Rounding/Room/bathroom lighting operational, light cord in reach

## 2022-06-02 NOTE — PROGRESS NOTE ADULT - ASSESSMENT
80 y/o F with PMH of HTN, DM type 2, Cardiac Arrest, CVA, COPD, Chronic Respiratory Failure Vent Dependant s/p trach & PEG, Multiple Hospital Admissions for Aspiration & vent associated PNA, COVID-19 Infection, Anemia with GI blood loss, GERD, and Advanced Dementia presented with acute respiratory distress.    on lasix  readmitted after less than 24 hr dc back to [facility]  I and O  monitor VS and HD      HCP Marciano -  - pt is full code  s/p emp ABX - broad spectrum for poss PNA  [facility] rx regimen  bronchodilators  oral and skin care  assist with needs - ADL  monitor VS and HD  CT imaging reviewed  Old records reviewed  suction PRN  trach care  peg care  nutritional optimization  decubitus prevention

## 2022-06-02 NOTE — PROVIDER CONTACT NOTE (EICU) - BACKGROUND
Vital Signs Last 24 Hrs  T(C): 36.3 (01 Jun 2022 23:45), Max: 37.1 (01 Jun 2022 03:42)  T(F): 97.4 (01 Jun 2022 23:45), Max: 98.7 (01 Jun 2022 03:42)  HR: 67 (02 Jun 2022 00:16) (60 - 81)  BP: 101/61 (02 Jun 2022 00:06) (101/61 - 135/78)  BP(mean): 74 (02 Jun 2022 00:06) (72 - 87)  RR: 22 (02 Jun 2022 00:06) (18 - 26)  SpO2: 100% (02 Jun 2022 00:06) (97% - 100%)

## 2022-06-02 NOTE — DIETITIAN INITIAL EVALUATION ADULT - OTHER INFO
Per H&P, pt is a "80 y/o F with PMH of HTN, DM type 2, Cardiac Arrest, Core Pulmonale, CVA, COPD, Chronic Respiratory Failure Vent Dependant s/p trach & PEG, COVID-19 Infection, CKD, Anemia, GERD, and Dementia who was sent from [facility] facility for acute respiratory distress. Patient was discharged this afternoon from [facility] after 10 days admission for acute on chronic respiratory failure 2ry to [facility] with parapneumonic pleural effusion, had thoracentesis on May 25th, and completed a course of Avycaz 2 days prior to discharge, then monitored off antibiotics till discharge without spiking any fever. Patient is non communicating, no further history could be obtained at this time."    Nutrition consult ordered for pressure ulcer stage II or >, tube feeding PTA. Pt admitted with unstageable to R gluteal fold, BL great toes, and R lateral foot, SDTI to L posterior foot, stage I to BL heels.  Pt with hx trach/PEG.  Per transfer documents, receiving Glucerna 1.5 to goal 60ml/hr x 20hrs (provides 1200ml TV formula, 1800kcal, 99g protein; also receiving 250ml free water q 6hrs, +25ml hourly flushes). Pt currently receiving tube feeds per MD order of Glucerna 1.5 via peg at goal rate of 60ml/hr x 24 hours (providing 1,440ml TV formula, 2,160kcal, and 118.8g protein); exceeding pt's nutritional needs. Recommend Glucerna 1.5 at goal rate of 60ml/hr x 20hrs (to provide 1200 ml TV formula, 1800kcal based on 31kcal/kg IBW, 99g protein based on 1.7g/kg IBW, 912ml free water from formula); recommend standard 25ml/hr free water flushes. CBW on admission 119#. Transfer wt of 119#; previous adm weight of 117#. No edema noted. +BM 6/1. RD to follow-up and will continue to monitor pt's nutrition status/tolerance to EN prescription.

## 2022-06-02 NOTE — PROGRESS NOTE ADULT - SUBJECTIVE AND OBJECTIVE BOX
Subjective: Patient seen and examined.  Re-admitted due to respiratory distress.      MEDICATIONS  (STANDING):  chlorhexidine 0.12% Liquid 15 milliLiter(s) Oral Mucosa every 12 hours  collagenase Ointment 1 Application(s) Topical daily  dextrose 5%. 1000 milliLiter(s) (50 mL/Hr) IV Continuous <Continuous>  dextrose 5%. 1000 milliLiter(s) (100 mL/Hr) IV Continuous <Continuous>  dextrose 50% Injectable 25 Gram(s) IV Push once  dextrose 50% Injectable 12.5 Gram(s) IV Push once  dextrose 50% Injectable 25 Gram(s) IV Push once  folic acid 1 milliGRAM(s) Oral daily  furosemide   Injectable 40 milliGRAM(s) IV Push daily  glucagon  Injectable 1 milliGRAM(s) IntraMuscular once  heparin   Injectable 5000 Unit(s) SubCutaneous every 8 hours  insulin glargine Injectable (LANTUS) 8 Unit(s) SubCutaneous every morning  insulin lispro (ADMELOG) corrective regimen sliding scale   SubCutaneous every 6 hours  lactobacillus acidophilus 1 Tablet(s) Oral daily  LORazepam     Tablet 1 milliGRAM(s) Oral every 4 hours  methocarbamol 500 milliGRAM(s) Oral two times a day  multivitamin 1 Tablet(s) Oral daily  nystatin Powder 1 Application(s) Topical every 12 hours  pantoprazole  Injectable 40 milliGRAM(s) IV Push daily  povidone iodine 10% Solution 1 Application(s) Topical daily  senna 3 Tablet(s) Oral at bedtime  simethicone 80 milliGRAM(s) Chew every 6 hours    MEDICATIONS  (PRN):  acetaminophen    Suspension .. 650 milliGRAM(s) Oral every 6 hours PRN Mild Pain (1 - 3), Moderate Pain (4 - 6)  albuterol/ipratropium for Nebulization 3 milliLiter(s) Nebulizer every 6 hours PRN Shortness of Breath and/or Wheezing  dextrose Oral Gel 15 Gram(s) Oral once PRN Blood Glucose LESS THAN 70 milliGRAM(s)/deciliter  LORazepam   Injectable 2 milliGRAM(s) IV Push every 4 hours PRN Agitation      Allergies    codeine (Hives)    Intolerances        Vital Signs Last 24 Hrs  T(C): 36.5 (02 Jun 2022 03:55), Max: 36.9 (01 Jun 2022 12:00)  T(F): 97.7 (02 Jun 2022 03:55), Max: 98.4 (01 Jun 2022 12:00)  HR: 84 (02 Jun 2022 07:45) (60 - 85)  BP: 134/62 (02 Jun 2022 06:39) (101/61 - 135/78)  BP(mean): 83 (02 Jun 2022 06:39) (70 - 90)  RR: 29 (02 Jun 2022 06:39) (18 - 29)  SpO2: 99% (02 Jun 2022 07:45) (90% - 100%)    PHYSICAL EXAM:  GENERAL: chronically ill appearing, emaciated, NAD, obtunded  HEAD:  atraumatic  EYES: conjunctiva clear  NECK: (+)trach in place, clean  RESPIRATORY: mechanical breath sounds, vent in place, no gross crackles or rales  CARDIOVASCULAR:  regular rate and rhythm, no murmurs or rubs or gallops, 2+ peripheral pulses  GASTROINTESTINAL:  soft, +PEG in place, clean and dry as well, nondistended, bowel sounds present  EXTREMITIES: no clubbing or cyanosis or edema  MUSCULOSKELETAL:  (+)diffuse contractures in all joints  NERVOUS SYSTEM: does not respond to pain      LABS:                        10.6   7.59  )-----------( 220      ( 02 Jun 2022 06:44 )             35.7     02 Jun 2022 06:44    145    |  110    |  37     ----------------------------<  205    4.9     |  27     |  1.15     Ca    8.7        02 Jun 2022 06:44  Phos  5.2       02 Jun 2022 06:44  Mg     2.7       02 Jun 2022 06:44    TPro  7.6    /  Alb  2.2    /  TBili  0.6    /  DBili  x      /  AST  17     /  ALT  18     /  AlkPhos  139    01 Jun 2022 22:18        CAPILLARY BLOOD GLUCOSE      POCT Blood Glucose.: 193 mg/dL (02 Jun 2022 08:34)  POCT Blood Glucose.: 159 mg/dL (02 Jun 2022 05:09)  POCT Blood Glucose.: 140 mg/dL (02 Jun 2022 00:04)  POCT Blood Glucose.: 196 mg/dL (01 Jun 2022 11:14)      RADIOLOGY & ADDITIONAL TESTS:    Imaging Personally Reviewed:  [ ] YES     Consultant(s) Notes Reviewed:      Care Discussed with Consultants/Other Providers:    Advanced Directives: [ ] DNR  [ ] No feeding tube  [ ] MOLST in chart  [ ] MOLST completed today  [ ] Unknown

## 2022-06-02 NOTE — PROGRESS NOTE ADULT - SUBJECTIVE AND OBJECTIVE BOX
[facility] DIVISION of INFECTIOUS DISEASE  Arturo Olivas MD PhD, Haylee Umanzor MD, Andressa Brantley MD, Billy Valenzuela MD, Emil Medellin MD  and providing coverage with Eusebio Chairez MD  Providing Infectious Disease Consultations at [facility], [facility], [facility]'s    Office# [phone] to schedule follow up appointments  Answering Service for urgent calls or New Consults [phone]  Cell# to text for urgent issues Arturo Olivas [phone]     infectious diseases progress note:    BREA BECKHAM is a 76y y. o. Female patient    No concerning overnight events    Allergies    codeine (Hives)    Intolerances        ANTIBIOTICS/RELEVANT:  antimicrobials    immunologic:    OTHER:  acetaminophen    Suspension .. 650 milliGRAM(s) Oral every 6 hours PRN  albuterol/ipratropium for Nebulization 3 milliLiter(s) Nebulizer every 6 hours PRN  chlorhexidine 0.12% Liquid 15 milliLiter(s) Oral Mucosa every 12 hours  collagenase Ointment 1 Application(s) Topical daily  dextrose 5%. 1000 milliLiter(s) IV Continuous <Continuous>  dextrose 5%. 1000 milliLiter(s) IV Continuous <Continuous>  dextrose 50% Injectable 25 Gram(s) IV Push once  dextrose 50% Injectable 12.5 Gram(s) IV Push once  dextrose 50% Injectable 25 Gram(s) IV Push once  dextrose Oral Gel 15 Gram(s) Oral once PRN  folic acid 1 milliGRAM(s) Oral daily  furosemide   Injectable 40 milliGRAM(s) IV Push daily  glucagon  Injectable 1 milliGRAM(s) IntraMuscular once  heparin   Injectable 5000 Unit(s) SubCutaneous every 8 hours  insulin glargine Injectable (LANTUS) 8 Unit(s) SubCutaneous every morning  insulin lispro (ADMELOG) corrective regimen sliding scale   SubCutaneous every 6 hours  lactobacillus acidophilus 1 Tablet(s) Oral daily  LORazepam     Tablet 2 milliGRAM(s) Oral every 4 hours  LORazepam   Injectable 2 milliGRAM(s) IV Push every 4 hours PRN  methocarbamol 500 milliGRAM(s) Oral two times a day  multivitamin 1 Tablet(s) Oral daily  nystatin Powder 1 Application(s) Topical every 12 hours  pantoprazole  Injectable 40 milliGRAM(s) IV Push daily  povidone iodine 10% Solution 1 Application(s) Topical daily  senna 3 Tablet(s) Oral at bedtime  simethicone 80 milliGRAM(s) Chew every 6 hours      Objective:  Vital Signs Last 24 Hrs  T(C): 36.6 (02 Jun 2022 12:15), Max: 36.8 (01 Jun 2022 20:16)  T(F): 97.8 (02 Jun 2022 12:15), Max: 98.3 (01 Jun 2022 20:16)  HR: 83 (02 Jun 2022 14:16) (60 - 87)  BP: 136/68 (02 Jun 2022 12:00) (101/61 - 140/64)  BP(mean): 88 (02 Jun 2022 12:00) (70 - 90)  RR: 35 (02 Jun 2022 12:00) (18 - 36)  SpO2: 100% (02 Jun 2022 14:16) (90% - 100%)    T(C): 36.6 (06-02-22 @ 12:15), Max: 37.6 (05-31-22 @ 18:00)  T(C): 36.6 (06-02-22 @ 12:15), Max: 37.7 (05-31-22 @ 10:03)  T(C): 36.6 (06-02-22 @ 12:15), Max: 37.7 (05-31-22 @ 10:03)    PHYSICAL EXAM:  HEENT: NC atraumatic  Neck: supple  Respiratory: no accessory muscle use, breathing comfortably  Cardiovascular: distant  Gastrointestinal: normal appearing, nondistended  Extremities: no clubbing, no cyanosis,    Mode: AC/ CMV (Assist Control/ Continuous Mandatory Ventilation), RR (machine): 24, TV (machine): 400, FiO2: 40, PEEP: 5, ITime: 0.9, MAP: 16, PIP: 30    LABS:                          10.6   7.59  )-----------( 220      ( 02 Jun 2022 06:44 )             35.7       WBC  7.59 06-02 @ 06:44  9.74 06-01 @ 22:18  7.53 06-01 @ 06:34  7.50 05-31 @ 06:19  9.68 05-30 @ 06:28  11.87 05-29 @ 06:43  9.45 05-28 @ 07:05  11.79 05-27 @ 06:00      06-02    145  |  110<H>  |  37<H>  ----------------------------<  205<H>  4.9   |  27  |  1.15    Ca    8.7      02 Jun 2022 06:44  Phos  5.2     06-02  Mg     2.7     06-02    TPro  7.6  /  Alb  2.2<L>  /  TBili  0.6  /  DBili  x   /  AST  17  /  ALT  18  /  AlkPhos  139<H>  06-01      Creatinine, Serum: 1.15 mg/dL (06-02-22 @ 06:44)  Creatinine, Serum: 1.13 mg/dL (06-01-22 @ 22:18)  Creatinine, Serum: 1.08 mg/dL (06-01-22 @ 06:34)  Creatinine, Serum: 1.07 mg/dL (05-31-22 @ 06:19)  Creatinine, Serum: 1.21 mg/dL (05-30-22 @ 06:28)  Creatinine, Serum: 1.13 mg/dL (05-29-22 @ 06:43)  Creatinine, Serum: 1.26 mg/dL (05-28-22 @ 07:05)  Creatinine, Serum: 1.32 mg/dL (05-27-22 @ 06:00)                INFLAMMATORY MARKERS  Auto Neutrophil #: 8.07 K/uL (06-01-22 @ 22:18)  Auto Lymphocyte #: 0.65 K/uL (06-01-22 @ 22:18)  Auto Neutrophil #: 5.79 K/uL (05-31-22 @ 06:19)  Auto Lymphocyte #: 0.76 K/uL (05-31-22 @ 06:19)  Auto Neutrophil #: 8.00 K/uL (05-28-22 @ 07:05)  Auto Lymphocyte #: 0.78 K/uL (05-28-22 @ 07:05)  Auto Lymphocyte #: 0.49 K/uL (05-26-22 @ 06:04)  Auto Neutrophil #: 10.06 K/uL (05-26-22 @ 06:04)  Auto Neutrophil #: 11.08 K/uL (05-23-22 @ 06:44)  Auto Lymphocyte #: 1.20 K/uL (05-23-22 @ 06:44)  Auto Neutrophil #: 10.55 K/uL (05-22-22 @ 06:23)  Auto Lymphocyte #: 1.67 K/uL (05-22-22 @ 06:23)  Auto Neutrophil #: 18.42 K/uL (05-21-22 @ 22:30)  Auto Lymphocyte #: 0.82 K/uL (05-21-22 @ 22:30)  Auto Neutrophil #: 20.65 K/uL (05-21-22 @ 20:28)  Auto Lymphocyte #: 1.84 K/uL (05-21-22 @ 20:28)    Lactate, Blood: 1.3 mmol/L (06-01-22 @ 22:46)  Lactate, Blood: 1.6 mmol/L (05-29-22 @ 06:43)  Lactate, Blood: 1.3 mmol/L (05-23-22 @ 06:44)  Lactate, Blood: 1.6 mmol/L (05-22-22 @ 15:05)  Lactate, Blood: 3.6 mmol/L (05-22-22 @ 10:01)  Lactate, Blood: 3.4 mmol/L (05-22-22 @ 06:23)  Lactate, Blood: 1.9 mmol/L (05-21-22 @ 20:28)    Auto Eosinophil #: 0.04 K/uL (06-01-22 @ 22:18)  Auto Eosinophil #: 0.15 K/uL (05-31-22 @ 06:19)  Auto Eosinophil #: 0.06 K/uL (05-28-22 @ 07:05)  Auto Eosinophil #: 0.14 K/uL (05-26-22 @ 06:04)  Auto Eosinophil #: 0.12 K/uL (05-23-22 @ 06:44)  Auto Eosinophil #: 0.04 K/uL (05-22-22 @ 06:23)  Auto Eosinophil #: 0.09 K/uL (05-21-22 @ 22:30)  Auto Eosinophil #: 0.00 K/uL (05-21-22 @ 20:28)    Lactate Dehydrogenase, Serum: 381 U/L (05-24-22 @ 06:56)      Procalcitonin, Serum: 0.67 ng/mL (05-29-22 @ 06:43)  Procalcitonin, Serum: 1.40 ng/mL (05-23-22 @ 06:44)            Ferritin, Serum: 565 ng/mL (05-23-22 @ 11:49)        INR: 1.23 ratio (05-29-22 @ 06:43)  INR: 1.20 ratio (05-24-22 @ 06:56)  INR: 1.29 ratio (05-23-22 @ 06:44)  Activated Partial Thromboplastin Time: 34.5 sec (05-22-22 @ 01:22)  INR: 1.23 ratio (05-22-22 @ 01:22)  Activated Partial Thromboplastin Time: 37.4 sec (05-21-22 @ 20:28)  INR: 1.17 ratio (05-21-22 @ 20:28)          MICROBIOLOGY:              RADIOLOGY & ADDITIONAL STUDIES:

## 2022-06-02 NOTE — DIETITIAN INITIAL EVALUATION ADULT - NS FNS DIET ORDER
Diet, NPO with Tube Feed:   Tube Feeding Modality: Gastrostomy  Glucerna 1.5 Horacio  Total Volume for 24 Hours (mL): 1440  Continuous  Starting Tube Feed Rate {mL per Hour}: 60  Until Goal Tube Feed Rate (mL per Hour): 60  Tube Feed Duration (in Hours): 24  Tube Feed Start Time: 00:00 (06-02-22 @ 10:31)

## 2022-06-02 NOTE — DIETITIAN INITIAL EVALUATION ADULT - PERTINENT LABORATORY DATA
06-02    145  |  110<H>  |  37<H>  ----------------------------<  205<H>  4.9   |  27  |  1.15    Ca    8.7      02 Jun 2022 06:44  Phos  5.2     06-02  Mg     2.7     06-02    TPro  7.6  /  Alb  2.2<L>  /  TBili  0.6  /  DBili  x   /  AST  17  /  ALT  18  /  AlkPhos  139<H>  06-01  POCT Blood Glucose.: 180 mg/dL (06-02-22 @ 09:48)  A1C with Estimated Average Glucose Result: 6.4 % (04-22-22 @ 12:14)  A1C with Estimated Average Glucose Result: 6.2 % (12-09-21 @ 14:24)  A1C with Estimated Average Glucose Result: 6.6 % (09-29-21 @ 21:06)

## 2022-06-02 NOTE — PROGRESS NOTE ADULT - ASSESSMENT
This is an 80 y/o F with PMH of HTN, DM type 2, Cardiac Arrest, CVA, COPD, Chronic Respiratory Failure Vent Dependant s/p trach & PEG, Multiple Hospital Admissions for Aspiration & vent associated PNA, COVID-19 Infection, Anemia with GI blood loss, GERD, and Advanced Dementia who was sent from [facility] facility for acute respiratory distress.     Sepsis 2/2 VAP/PNA c/b pleural effusions  Pleural Effusions: parapneumonic vs. empyema  - prior sputum 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  - s/p IR guided thoracentesis on 5/25    --pleural fluid cx NGTD  - repeat 5/25 CT chest w/ improved aeration  Plan:  s/p avycaz  x7 day completed 5/28  S/p linezolid    6/1-discharged but then readmitted w resp distress  no sig change noted  C/w vent management per ICU  C/w monitoring off Abx    Thank you for consulting us and involving us in the management of this most interesting and challenging case.  We will follow along in the care of this patient. Please call us at [phone] or text me directly on my cell# at [phone] with any concerns.

## 2022-06-02 NOTE — CONSULT NOTE ADULT - ASSESSMENT
Initial evaluation/Pulmonary Critical Care consultation requested on  6/2/2022 by Dr ELISE   from Dr Sandoval   Patient examined chart reviewed    HOSPITAL ADMISSION   PATIENT CAME  FROM (if information available)      REVIEW OF SYMPTOMS      Able to give (reliable) ROS  NO     PHYSICAL EXAM    HEENT Unremarkable  atraumatic   RESP Fair air entry EXP prolonged    Harsh breath sound Resp distres mild   CARDIAC S1 S2 No S3     NO JVD    ABDOMEN SOFT BS PRESENT NOT DISTENDED No hepatosplenomegaly   PEDAL EDEMA present No calf tenderness  NO rash     PATIENT PRESENTATION.  79F HTN, DM2, COPD, Chronic Respiratory Failure on Trach to Vent admitted just discharged 6/1/2022 at 2pm  back at 7pm for respiratory distress and tachypnea. Pt recently admitted on 5/21 -6/1 for chronic hypoxic resp failure. At nursing home when transferring vent to theri vent pt was hypoxic despite 100% fi02. Now sent to SY. in the Ed vitals normal, labs w/n/l. CXr with slight worsening of b/l infiltrates.   Pulm Critical care consulted 6/2/2022                                            AGE/SEX.   76 f  DOA.  6/2/2022  CC .  6/2/2022 Resp distress at [facility]  PMH .  pmh Hosp stay 5/21-6/1/2022 ceftazidime avibactam 1.25x2 5/22- 5/30/2022    pmh Hosp stay given zosyn 4/21  pmh Pleural effsn   5/25/2022 1.5 l clear pl effsn removed left side IR  5/25/2022 g 183 l 144/381 .37 p 3.4/5.3 .64 p 23 l 36   5/25/2022l pl fluid  lymph pred exudate   cyto (-)     pmh TRANSFUSION.  5/23/2022 1 u prbc  pmh Pneumonia    pmh HTN,   pmh DM,   pmh CVA,   pmh  chronic respiratory failure   pmh s/p trach, PEG,   pmh cardiac arrest      MAIN ISSUES .  CHF HFPEF AOC poa 6/1   VDRF pmh     COVID/ICU/CODE STATUS.                       COVID  STATUS.           ICU STAY. 6/2/2022  GOC.  6/2/2022 full code     BEST PRACTICE ISSUES.                                                  HEAD OF BED ELEVATION. Yes  DVT PROPHYLAXIS.   6/1 hpsc    SQUIRES PROPHYLAXIS. 6/1 protonix 40                                                                                        DIET.  6/2/2022 glucerna 1.5 1440              VITALS/PO/IO/VENT/DRIPS.     6/2/2022 afeb 86 130/60   6/2/2022 ac 24/400/5/.4        GENERAL ISSUES .                                       ALLERGY.  codeine                          WEIGHT.     6/2/2022 54                               BMI.              6/2/2022 19             PROBLEM DATA/ASSESSMENT RECOMMENDATIONS (A/R).    HEMODYNAMICS.   Monitor and optimize bp   Target MAP to miguel  65 (+)    RESP.   Monitor and optimize  po   Target po to remain 90-95%    PMH/PSH PROBLEMS.  Management continued/modified as indicated    VENT MANAGEMENT.   HOB elevation  Target Pplat 30 (-)  Target PO 90-95%  Target pH 730 (+)  Daily spontaneous breathing trials   Daily sedation vacation         INFECTION SOURCE DD.   INFECTION A/R.   No obvious new infection  Shje is status post recent course of abio ceftazidime avibactam 1.25x2 5/22- 5/30/2022    defer abio  ID eval         COPD.  6/1/2022 duoneb.4  CHF.  echo 5/30/2022 ef 65% pasp 60 dialted rv   6/2/2022 lasix 40  ANEMIA.  Hb 6/2/2022 Hb 10.6   RENAL.  Na 6/2/2022 Na 145  Cr 6/2/2022 Cr 1.1  DM.   6/1 riss   6/1 lantus 8  ANXIETY.  6/2/2022 lorazepam 2.6   DECUB. 6/1 collagenase r gluteal wound       TIME SPENT   Over 39 minutes aggregate critical care time spent on encounter; activities included   direct patient care, counseling and/or coordinating care reviewing notes, lab data/ imaging , discussion with multidisciplinary team/ patient  /family and explaining in detail risks, benefits, alternatives  of the recommendations     CHAPINCITO Cooley f       Initial evaluation/Pulmonary Critical Care consultation requested on  6/2/2022 by Dr ELISE   from Dr Sandoval   Patient examined chart reviewed    HOSPITAL ADMISSION   PATIENT CAME  FROM (if information available)      REVIEW OF SYMPTOMS      Able to give (reliable) ROS  NO     PHYSICAL EXAM    HEENT Unremarkable  atraumatic   RESP Fair air entry EXP prolonged    Harsh breath sound Resp distres mild   CARDIAC S1 S2 No S3     NO JVD    ABDOMEN SOFT BS PRESENT NOT DISTENDED No hepatosplenomegaly   PEDAL EDEMA present No calf tenderness  NO rash     PATIENT PRESENTATION.  79F HTN, DM2, COPD, Chronic Respiratory Failure on Trach to Vent admitted just discharged 6/1/2022 at 2pm  back at 7pm for respiratory distress and tachypnea. Pt recently admitted on 5/21 -6/1 for chronic hypoxic resp failure. At nursing home when transferring vent to theri vent pt was hypoxic despite 100% fi02. Now sent to [facility]. in the Ed vitals normal, labs w/n/l. CXr with slight worsening of b/l infiltrates.   Pulm Critical care consulted 6/2/2022                                            AGE/SEX.   76 f  DOA.  6/2/2022  CC .  6/2/2022 Resp distress at [facility]  PMH .  pmh Hosp stay 5/21-6/1/2022 ceftazidime avibactam 1.25x2 5/22- 5/30/2022    pmh Hosp stay given zosyn 4/21  pmh Pleural effsn   5/25/2022 1.5 l clear pl effsn removed left side IR  5/25/2022 g 183 l 144/381 .37 p 3.4/5.3 .64 p 23 l 36   5/25/2022l pl fluid  lymph pred exudate   cyto (-)     pmh TRANSFUSION.  5/23/2022 1 u prbc  pmh Pneumonia    pmh HTN,   pmh DM,   pmh CVA,   pmh  chronic respiratory failure   pmh s/p trach, PEG,   pmh cardiac arrest      MAIN ISSUES .  CHF HFPEF AOC poa 6/1   VDRF pmh     COVID/ICU/CODE STATUS.                       COVID  STATUS.           ICU STAY. 6/2/2022  GOC.  6/2/2022 full code     BEST PRACTICE ISSUES.                                                  HEAD OF BED ELEVATION. Yes  DVT PROPHYLAXIS.   6/1 hpsc    SQUIRES PROPHYLAXIS. 6/1 protonix 40                                                                                        DIET.  6/2/2022 glucerna 1.5 1440              VITALS/PO/IO/VENT/DRIPS.     6/2/2022 afeb 86 130/60   6/2/2022 ac 24/400/5/.4        GENERAL ISSUES .                                       ALLERGY.  codeine                          WEIGHT.     6/2/2022 54                               BMI.              6/2/2022 19             PROBLEM DATA/ASSESSMENT RECOMMENDATIONS (A/R).    HEMODYNAMICS.   Monitor and optimize bp   Target MAP to miguel  65 (+)    RESP.   Monitor and optimize  po   Target po to remain 90-95%    PMH/PSH PROBLEMS.  Management continued/modified as indicated    VENT MANAGEMENT.   HOB elevation  Target Pplat 30 (-)  Target PO 90-95%  Target pH 730 (+)  Daily spontaneous breathing trials   Daily sedation vacation         INFECTION SOURCE DD.   INFECTION A/R.   No obvious new infection  Shje is status post recent course of abio ceftazidime avibactam 1.25x2 5/22- 5/30/2022    defer abio  ID eval         COPD.  6/1/2022 duoneb.4  CHF.  echo 5/30/2022 ef 65% pasp 60 dialted rv   6/2/2022 lasix 40  ANEMIA.  Hb 6/2/2022 Hb 10.6   RENAL.  Na 6/2/2022 Na 145  Cr 6/2/2022 Cr 1.1  DM.   6/1 riss   6/1 lantus 8  ANXIETY.  6/2/2022 lorazepam 2.6     DECUB.   6/1 collagenase r gluteal wound       TIME SPENT   Over 55 minutes aggregate critical care time spent on encounter; activities included   direct patient care, counseling and/or coordinating care reviewing notes, lab data/ imaging , discussion with multidisciplinary team/ patient  /family and explaining in detail risks, benefits, alternatives  of the recommendations     CHAPINCITO Cooley f

## 2022-06-02 NOTE — CONSULT NOTE ADULT - ASSESSMENT
The patient is a 76 year old female with a history of HTN, DM, CVA, dementia, chronic respiratory failure s/p trach, PEG, cardiac arrest, anemia who presents with respiratory distress.    Plan:  - Echo 5/30/22 with normal LV systolic function, mod pulm HTN  - Cardiac enzymes negative  - CT chest with moderate bilateral pleural effusions  - Pleural effusions last admission consistent with exudate  - Vent settings weaned down to normal overnight  - On furosemide 40 mg IV daily. Ok to continue for now.  - May need increased sedation  - Likely needs repeat thoracentesis  - Pulm eval

## 2022-06-02 NOTE — DIETITIAN INITIAL EVALUATION ADULT - NSFNSGIIOFT_GEN_A_CORE
06-01-22 @ 07:01  -  06-02-22 @ 07:00  --------------------------------------------------------  OUT:  Total OUT: 0 mL    Total NET: 420 mL

## 2022-06-02 NOTE — DIETITIAN INITIAL EVALUATION ADULT - ORAL INTAKE PTA/DIET HISTORY
Pt admitted from [facility]. Reviewed transfer documents; pt was receiving tube feeds of Glucerna 1.5 via peg to goal 60ml/hr x 20hrs (provides 1200ml TV formula, 1800kcal, 99g protein; also receiving 250ml free water q 6hrs, +25ml hourly flushes).

## 2022-06-02 NOTE — PROGRESS NOTE ADULT - ASSESSMENT
79F HTN, DM2, COPD, Chronic Respiratory Failure on Trach to Vent re-admitted for Acute Respiratory Distress    Acute Respiratory Distress  Patient discharged 6/1/22 and repeat CT showing increase pleural effusions  Distress could be related to anxiety component as well  Treated with Abx for PNA on prior admission and had Thoracentesis done 1500cc on Left SIde   TTE- Normal LV, dilated RV, mod pulm htn, small pericardial effusion  Continue Lasix Daily  Pulmonary and Cardiology Input appreciated    Anemia of Chronic Disease with Iron Deficiency  Has been evaluated by GI and Hematology on prior admissions  She has Anemia of Chronic Disease but could also have intermittent GI Bleeding; Occult Blood Negative  S/P 2U PRBC Transfusion and IV Venofer  Multivitamin daily    Acute Renal Failure on CKD 3  Baseline Cr around 1.2  Will continue gentle hydration and maintain BP   Renally dose and monitor BMP and electrolytes     HTN  Hold all BP lower agents    DM2  FS and on ISS to maintain BS <180    Diet  Tube Feeds    DVT Prophylaxis  Heparin    Disposition  Full Code

## 2022-06-02 NOTE — PATIENT PROFILE ADULT - TRANSPORTATION
Lovenox 40 mg daily ordered for patient. This medication is renally eliminated. Will change to Lovenox 30 mg daily per renal dose adjustment policy. Estimated Creatinine Clearance: 26 mL/min (A) (based on SCr of 2.1 mg/dL (H)). Pharmacy will continue to monitor renal function and adjust dose as necessary. Please call with any questions. Thanks!   Annabelle Monroe PharmD, [facility] 9/13/2019 4:58 PM
Pt admitted to room 4324 from ED. Pt alert and oriented X4. Full assessment and vitals obtained (see doc flow sheets). Pt oriented to call light, room, and plan of care. Call light and belongings left within reach. No needs expressed at this time. Will continue to monitor and report changes in condition to physician.  Electronically signed by Jennifer Larkin RN on 9/13/2019 at 4:33 PM
sodium chloride flush 0.9 % injection 10 mL PRN   ondansetron (ZOFRAN) injection 4 mg Q6H PRN   enoxaparin (LOVENOX) injection 30 mg Daily   allopurinol (ZYLOPRIM) tablet 300 mg Daily   aspirin chewable tablet 81 mg Daily   atenolol (TENORMIN) tablet 100 mg Daily   latanoprost (XALATAN) 0.005 % ophthalmic solution 1 drop Nightly   losartan (COZAAR) tablet 100 mg Daily   traZODone (DESYREL) tablet 50 mg Nightly   fluticasone (FLONASE) 50 MCG/ACT nasal spray 2 spray Daily   hydrALAZINE (APRESOLINE) injection 10 mg Q4H PRN   labetalol (NORMODYNE;TRANDATE) injection syringe 5 mg Q4H PRN   doxazosin (CARDURA) tablet 4 mg 2 times per day       Review of Systems:   14 point ROS obtained but were negative except mentioned in HPI      Physical exam:     Vitals:  BP (!) 173/88   Pulse 68   Temp 97.9 °F (36.6 °C) (Oral)   Resp 16   Ht 5' 4\" (1.626 m)   Wt 179 lb 7.3 oz (81.4 kg)   LMP  (LMP Unknown)   SpO2 96%   BMI 30.80 kg/m²   Constitutional:  OAA X3 NAD  Skin: no rash, turgor wnl  Heent:  eomi, mmm  Neck: no bruits or jvd noted  Cardiovascular:  S1, S2 reg  Respiratory: CTA B without w/r/r  Abdomen:  +bs, soft, nt, nd  Ext: + lower extremity edema  Psychiatric: mood and affect appropriate  Musculoskeletal:  Rom, muscular strength intact    Data:   Labs:  CBC:   Recent Labs     09/13/19  1317 09/14/19  0451   WBC 9.3 9.2   HGB 13.4 12.6    256     BMP:    Recent Labs     09/13/19  1317 09/14/19  0451    140   K 4.0 4.3   CL 99 102   CO2 25 25   BUN 34* 34*   CREATININE 2.1* 2.4*   GLUCOSE 94 98     Ca/Mg/Phos:   Recent Labs     09/13/19  1317 09/14/19  0451   CALCIUM 9.4 9.5     Hepatic: No results for input(s): AST, ALT, ALB, BILITOT, ALKPHOS in the last 72 hours. Troponin:   Recent Labs     09/13/19  1317   TROPONINI <0.01             IMAGING:  CT Head WO Contrast   Final Result      No acute intracranial findings.          XR CHEST STANDARD (2 VW)   Final Result   Impression: No acute
no

## 2022-06-02 NOTE — PATIENT PROFILE ADULT - LEGAL HELP
My signature below certifies that the above stated patient is homebound and upon completion of the Face-To-Face encounter, has the need for intermittent skilled nursing, physical therapy and/or speech or occupational therapy services in their home for their current diagnosis as outlined in their initial plan of care. These services will continue to be monitored by myself or another physician.
no

## 2022-06-02 NOTE — DIETITIAN INITIAL EVALUATION ADULT - PERTINENT MEDS FT
MEDICATIONS  (STANDING):  chlorhexidine 0.12% Liquid 15 milliLiter(s) Oral Mucosa every 12 hours  collagenase Ointment 1 Application(s) Topical daily  dextrose 5%. 1000 milliLiter(s) (50 mL/Hr) IV Continuous <Continuous>  dextrose 5%. 1000 milliLiter(s) (100 mL/Hr) IV Continuous <Continuous>  dextrose 50% Injectable 25 Gram(s) IV Push once  dextrose 50% Injectable 12.5 Gram(s) IV Push once  dextrose 50% Injectable 25 Gram(s) IV Push once  folic acid 1 milliGRAM(s) Oral daily  furosemide   Injectable 40 milliGRAM(s) IV Push daily  glucagon  Injectable 1 milliGRAM(s) IntraMuscular once  heparin   Injectable 5000 Unit(s) SubCutaneous every 8 hours  insulin glargine Injectable (LANTUS) 8 Unit(s) SubCutaneous every morning  insulin lispro (ADMELOG) corrective regimen sliding scale   SubCutaneous every 6 hours  lactobacillus acidophilus 1 Tablet(s) Oral daily  LORazepam     Tablet 2 milliGRAM(s) Oral every 4 hours  methocarbamol 500 milliGRAM(s) Oral two times a day  multivitamin 1 Tablet(s) Oral daily  nystatin Powder 1 Application(s) Topical every 12 hours  pantoprazole  Injectable 40 milliGRAM(s) IV Push daily  povidone iodine 10% Solution 1 Application(s) Topical daily  senna 3 Tablet(s) Oral at bedtime  simethicone 80 milliGRAM(s) Chew every 6 hours    MEDICATIONS  (PRN):  acetaminophen    Suspension .. 650 milliGRAM(s) Oral every 6 hours PRN Mild Pain (1 - 3), Moderate Pain (4 - 6)  albuterol/ipratropium for Nebulization 3 milliLiter(s) Nebulizer every 6 hours PRN Shortness of Breath and/or Wheezing  dextrose Oral Gel 15 Gram(s) Oral once PRN Blood Glucose LESS THAN 70 milliGRAM(s)/deciliter  LORazepam   Injectable 2 milliGRAM(s) IV Push every 4 hours PRN Agitation

## 2022-06-02 NOTE — DIETITIAN INITIAL EVALUATION ADULT - NSFNSPHYEXAMSKINFT_GEN_A_CORE
Pressure Injury 1: Right:,gluteal fold, Unstageable  Pressure Injury 2: Left:,first toe, Unstageable  Pressure Injury 3: Right:,first toe, Unstageable  Pressure Injury 4: Right:,lateral aspect,lateral, Unstageable  Pressure Injury 5: Left:,posterior,under foot, Suspected deep tissue injury  Pressure Injury 6: Right:,heel, Stage I  Pressure Injury 7: Left:,heel, Stage I  Pressure Injury 8: none, none  Pressure Injury 9: none, none  Pressure Injury 10: none, none  Pressure Injury 11: none, none

## 2022-06-02 NOTE — PROGRESS NOTE ADULT - SUBJECTIVE AND OBJECTIVE BOX
Patient is a 76y old  Female who presents with a chief complaint of Respiratory distress. (02 Jun 2022 15:02)    PAST MEDICAL & SURGICAL HISTORY:  Dementia of frontal lobe type      Aphasic stroke      Diabetes mellitus      Respiratory failure      Hypertension      GERD (gastroesophageal reflux disease)      Constipation      Respiratory failure      CVA (cerebral vascular accident)      HTN (hypertension)      DM (diabetes mellitus)      Advanced dementia      COVID-19 virus detected      Quadriplegia      Pneumonia      Type II diabetes mellitus      Hx of appendectomy      Gastrostomy in place      Tracheostomy in place      Tracheostomy tube present      Feeding by G-tube        BREA BECKHAM   76y    Female    BRIEF HOSPITAL COURSE:    Review of Systems:                       All other ROS are negative.    Allergies    codeine (Hives)    Intolerances          ICU Vital Signs Last 24 Hrs  T(C): 37.3 (02 Jun 2022 20:09), Max: 37.3 (02 Jun 2022 16:15)  T(F): 99.2 (02 Jun 2022 20:09), Max: 99.2 (02 Jun 2022 16:15)  HR: 92 (02 Jun 2022 18:03) (60 - 92)  BP: 141/93 (02 Jun 2022 16:00) (101/61 - 141/93)  BP(mean): 106 (02 Jun 2022 16:00) (70 - 106)  ABP: --  ABP(mean): --  RR: 47 (02 Jun 2022 16:00) (18 - 47)  SpO2: 100% (02 Jun 2022 18:03) (90% - 100%)    Physical Examination:    General:     HEENT:     PULM:     CVS:     ABD:     EXT:     SKIN:     Neuro:    ABG - ( 01 Jun 2022 22:14 )  pH, Arterial: 7.47  pH, Blood: x     /  pCO2: 36    /  pO2: 261   / HCO3: 26    / Base Excess: 2.5   /  SaO2: 100.0             Mode: AC/ CMV (Assist Control/ Continuous Mandatory Ventilation)  RR (machine): 24  TV (machine): 400  FiO2: 40  PEEP: 5  ITime: 0.9  MAP: 20  PIP: 40    Mode: AC/ CMV (Assist Control/ Continuous Mandatory Ventilation), RR (machine): 24, TV (machine): 400, FiO2: 40, PEEP: 5, ITime: 0.9, MAP: 20, PIP: 40  LABS:                        10.6   7.59  )-----------( 220      ( 02 Jun 2022 06:44 )             35.7     06-02    145  |  110<H>  |  37<H>  ----------------------------<  205<H>  4.9   |  27  |  1.15    Ca    8.7      02 Jun 2022 06:44  Phos  5.2     06-02  Mg     2.7     06-02    TPro  7.6  /  Alb  2.2<L>  /  TBili  0.6  /  DBili  x   /  AST  17  /  ALT  18  /  AlkPhos  139<H>  06-01          CAPILLARY BLOOD GLUCOSE      POCT Blood Glucose.: 180 mg/dL (02 Jun 2022 18:02)  POCT Blood Glucose.: 178 mg/dL (02 Jun 2022 11:53)  POCT Blood Glucose.: 180 mg/dL (02 Jun 2022 09:48)  POCT Blood Glucose.: 193 mg/dL (02 Jun 2022 08:34)  POCT Blood Glucose.: 159 mg/dL (02 Jun 2022 05:09)  POCT Blood Glucose.: 140 mg/dL (02 Jun 2022 00:04)        CULTURES:  Rapid RVP Result: NotDetec (06-02 @ 14:56)      Medications:  MEDICATIONS  (STANDING):  chlorhexidine 0.12% Liquid 15 milliLiter(s) Oral Mucosa every 12 hours  collagenase Ointment 1 Application(s) Topical daily  dextrose 5%. 1000 milliLiter(s) (50 mL/Hr) IV Continuous <Continuous>  dextrose 5%. 1000 milliLiter(s) (100 mL/Hr) IV Continuous <Continuous>  dextrose 50% Injectable 25 Gram(s) IV Push once  dextrose 50% Injectable 12.5 Gram(s) IV Push once  dextrose 50% Injectable 25 Gram(s) IV Push once  folic acid 1 milliGRAM(s) Oral daily  furosemide   Injectable 40 milliGRAM(s) IV Push daily  glucagon  Injectable 1 milliGRAM(s) IntraMuscular once  heparin   Injectable 5000 Unit(s) SubCutaneous every 8 hours  insulin glargine Injectable (LANTUS) 8 Unit(s) SubCutaneous every morning  insulin lispro (ADMELOG) corrective regimen sliding scale   SubCutaneous every 6 hours  lactobacillus acidophilus 1 Tablet(s) Oral daily  LORazepam     Tablet 2 milliGRAM(s) Oral every 4 hours  methocarbamol 500 milliGRAM(s) Oral two times a day  multivitamin 1 Tablet(s) Oral daily  nystatin Powder 1 Application(s) Topical every 12 hours  pantoprazole  Injectable 40 milliGRAM(s) IV Push daily  povidone iodine 10% Solution 1 Application(s) Topical daily  senna 3 Tablet(s) Oral at bedtime  simethicone 80 milliGRAM(s) Chew every 6 hours    MEDICATIONS  (PRN):  acetaminophen    Suspension .. 650 milliGRAM(s) Oral every 6 hours PRN Mild Pain (1 - 3), Moderate Pain (4 - 6)  albuterol/ipratropium for Nebulization 3 milliLiter(s) Nebulizer every 6 hours PRN Shortness of Breath and/or Wheezing  dextrose Oral Gel 15 Gram(s) Oral once PRN Blood Glucose LESS THAN 70 milliGRAM(s)/deciliter  LORazepam   Injectable 2 milliGRAM(s) IV Push every 4 hours PRN Agitation        06-01 @ 07:01  -  06-02 @ 07:00  --------------------------------------------------------  IN: 420 mL / OUT: 0 mL / NET: 420 mL    06-02 @ 07:01  -  06-02 @ 21:23  --------------------------------------------------------  IN: 0 mL / OUT: 1000 mL / NET: -1000 mL        RADIOLOGY/IMAGING/ECHO    < from: US Transthoracic Echocardiogram w/Doppler Complete (05.30.22 @ 10:54) >  IMPRESSION:  1. Normal left ventricular systolic function.  2. Dilated right ventricle.  3. Moderate pulmonary hypertension.    < end of copied text >    < from: CT Chest No Cont (06.01.22 @ 23:29) >    IMPRESSION:  Pulmonary edema. Worsening of bilateral pleural effusions likely   reflecting progressive CHF.    Increase in bilateral patchy airspace opacities concerning for   superimposed multifocal pneumonia          Assessment/Plan:     75 yo female with PMHx chronic respiratory failure s/p trach and peg, HTN, T2DM, CVA, GERD, and dementia                HFpEF     CRITICAL CARE TIME SPENT: 37 minutes assessing presenting problems of acute illness, which pose high probability of life threatening deterioration or end organ damage/dysfunction, as well as medical decision making including initiating plan of care, reviewing data, reviewing radiologic exams, discussing with multidisciplinary team,  discussing goals of care with patient/family, and writing this note.  Non-inclusive of procedures performed,         Patient is a 76y old  Female who presents with a chief complaint of Respiratory distress. (02 Jun 2022 15:02)    PAST MEDICAL & SURGICAL HISTORY:  Dementia of frontal lobe type      Aphasic stroke      Diabetes mellitus      Respiratory failure      Hypertension      GERD (gastroesophageal reflux disease)      Constipation      Respiratory failure      CVA (cerebral vascular accident)      HTN (hypertension)      DM (diabetes mellitus)      Advanced dementia      COVID-19 virus detected      Quadriplegia      Pneumonia      Type II diabetes mellitus      Hx of appendectomy      Gastrostomy in place      Tracheostomy in place      Tracheostomy tube present      Feeding by G-tube        BREA BECKHAM   76y    Female    BRIEF HOSPITAL COURSE:    Review of Systems:    UATO                   All other ROS are negative.    Allergies    codeine (Hives)    Intolerances          ICU Vital Signs Last 24 Hrs  T(C): 37.3 (02 Jun 2022 20:09), Max: 37.3 (02 Jun 2022 16:15)  T(F): 99.2 (02 Jun 2022 20:09), Max: 99.2 (02 Jun 2022 16:15)  HR: 92 (02 Jun 2022 18:03) (60 - 92)  BP: 141/93 (02 Jun 2022 16:00) (101/61 - 141/93)  BP(mean): 106 (02 Jun 2022 16:00) (70 - 106)  ABP: --  ABP(mean): --  RR: 47 (02 Jun 2022 16:00) (18 - 47)  SpO2: 100% (02 Jun 2022 18:03) (90% - 100%)    Physical Examination:    General: unresponsive     HEENT: trach     PULM: bilateral BS     CVS: s1 s2 reg    ABD:  peg soft     EXT: _ edema     SKIN: warm     Neuro: as above     ABG - ( 01 Jun 2022 22:14 )  pH, Arterial: 7.47  pH, Blood: x     /  pCO2: 36    /  pO2: 261   / HCO3: 26    / Base Excess: 2.5   /  SaO2: 100.0             Mode: AC/ CMV (Assist Control/ Continuous Mandatory Ventilation)  RR (machine): 24  TV (machine): 400  FiO2: 40  PEEP: 5  ITime: 0.9  MAP: 20  PIP: 40    Mode: AC/ CMV (Assist Control/ Continuous Mandatory Ventilation), RR (machine): 24, TV (machine): 400, FiO2: 40, PEEP: 5, ITime: 0.9, MAP: 20, PIP: 40  LABS:                        10.6   7.59  )-----------( 220      ( 02 Jun 2022 06:44 )             35.7     06-02    145  |  110<H>  |  37<H>  ----------------------------<  205<H>  4.9   |  27  |  1.15    Ca    8.7      02 Jun 2022 06:44  Phos  5.2     06-02  Mg     2.7     06-02    TPro  7.6  /  Alb  2.2<L>  /  TBili  0.6  /  DBili  x   /  AST  17  /  ALT  18  /  AlkPhos  139<H>  06-01          CAPILLARY BLOOD GLUCOSE      POCT Blood Glucose.: 180 mg/dL (02 Jun 2022 18:02)  POCT Blood Glucose.: 178 mg/dL (02 Jun 2022 11:53)  POCT Blood Glucose.: 180 mg/dL (02 Jun 2022 09:48)  POCT Blood Glucose.: 193 mg/dL (02 Jun 2022 08:34)  POCT Blood Glucose.: 159 mg/dL (02 Jun 2022 05:09)  POCT Blood Glucose.: 140 mg/dL (02 Jun 2022 00:04)        CULTURES:  Rapid RVP Result: NotDetec (06-02 @ 14:56)      Medications:  MEDICATIONS  (STANDING):  chlorhexidine 0.12% Liquid 15 milliLiter(s) Oral Mucosa every 12 hours  collagenase Ointment 1 Application(s) Topical daily  dextrose 5%. 1000 milliLiter(s) (50 mL/Hr) IV Continuous <Continuous>  dextrose 5%. 1000 milliLiter(s) (100 mL/Hr) IV Continuous <Continuous>  dextrose 50% Injectable 25 Gram(s) IV Push once  dextrose 50% Injectable 12.5 Gram(s) IV Push once  dextrose 50% Injectable 25 Gram(s) IV Push once  folic acid 1 milliGRAM(s) Oral daily  furosemide   Injectable 40 milliGRAM(s) IV Push daily  glucagon  Injectable 1 milliGRAM(s) IntraMuscular once  heparin   Injectable 5000 Unit(s) SubCutaneous every 8 hours  insulin glargine Injectable (LANTUS) 8 Unit(s) SubCutaneous every morning  insulin lispro (ADMELOG) corrective regimen sliding scale   SubCutaneous every 6 hours  lactobacillus acidophilus 1 Tablet(s) Oral daily  LORazepam     Tablet 2 milliGRAM(s) Oral every 4 hours  methocarbamol 500 milliGRAM(s) Oral two times a day  multivitamin 1 Tablet(s) Oral daily  nystatin Powder 1 Application(s) Topical every 12 hours  pantoprazole  Injectable 40 milliGRAM(s) IV Push daily  povidone iodine 10% Solution 1 Application(s) Topical daily  senna 3 Tablet(s) Oral at bedtime  simethicone 80 milliGRAM(s) Chew every 6 hours    MEDICATIONS  (PRN):  acetaminophen    Suspension .. 650 milliGRAM(s) Oral every 6 hours PRN Mild Pain (1 - 3), Moderate Pain (4 - 6)  albuterol/ipratropium for Nebulization 3 milliLiter(s) Nebulizer every 6 hours PRN Shortness of Breath and/or Wheezing  dextrose Oral Gel 15 Gram(s) Oral once PRN Blood Glucose LESS THAN 70 milliGRAM(s)/deciliter  LORazepam   Injectable 2 milliGRAM(s) IV Push every 4 hours PRN Agitation        06-01 @ 07:01  -  06-02 @ 07:00  --------------------------------------------------------  IN: 420 mL / OUT: 0 mL / NET: 420 mL    06-02 @ 07:01  -  06-02 @ 21:23  --------------------------------------------------------  IN: 0 mL / OUT: 1000 mL / NET: -1000 mL        RADIOLOGY/IMAGING/ECHO    < from: US Transthoracic Echocardiogram w/Doppler Complete (05.30.22 @ 10:54) >  IMPRESSION:  1. Normal left ventricular systolic function.  2. Dilated right ventricle.  3. Moderate pulmonary hypertension.    < end of copied text >    < from: CT Chest No Cont (06.01.22 @ 23:29) >    IMPRESSION:  Pulmonary edema. Worsening of bilateral pleural effusions likely   reflecting progressive CHF.    Increase in bilateral patchy airspace opacities concerning for   superimposed multifocal pneumonia          Assessment/Plan:    77 yo female with PMHx chronic respiratory failure s/p trach and peg, HTN, T2DM, CVA, GERD, and dementia    Has spent a bulk of the last 2 years in and out of the hospital>SNF.  D/C yesterday after rx for PNA and was then readmitted a few hours later due to hypoxemia in the SNF.      I was told by he RN that she is set for thoracentesis tomorrow for pleural effusions  that are the result of severe malnutrition and diastolic HF.     These effusions are chronic and will likely be recurrent.  She should probably have Pleurx caths placed bilaterally to prevent unneeded readmissions due to vent asynchrony and transient hypoxemia.     No sepsis criteria she  just completed therapy for  PNA.    She has an IV in her thumb.  Will try to obtain additional access.

## 2022-06-02 NOTE — PROVIDER CONTACT NOTE (EICU) - SITUATION
79F HTN, DM2, COPD, Chronic Respiratory Failure on Trach to Vent admitted just discharged earlier today at 2pm now back at 7pm for respiratory distress and tachypnea. Pt recently admitted on 5/21 -6/1 for chronic hypoxic resp failure. At nursing home when transferring vent to theri vent pt was hypoxic despite 100% fi02. Now sent to [facility]. in the Ed vitals normal, labs w/n/l. CXr with slight worsening of b/l infiltrates.  Case discussed, appeared hypervolemic, CHF.  Clinically impving with Lasix

## 2022-06-02 NOTE — PROGRESS NOTE ADULT - SUBJECTIVE AND OBJECTIVE BOX
Date/Time Patient Seen:  		  Referring MD:   Data Reviewed	       Patient is a 76y old  Female who presents with a chief complaint of Respiratory distress. (01 Jun 2022 23:29)      Subjective/HPI     PAST MEDICAL & SURGICAL HISTORY:  Dementia of frontal lobe type    Aphasic stroke    Diabetes mellitus    Respiratory failure    Hypertension    GERD (gastroesophageal reflux disease)    Constipation    Respiratory failure    CVA (cerebral vascular accident)    HTN (hypertension)    DM (diabetes mellitus)    Advanced dementia    COVID-19 virus detected    Quadriplegia    Pneumonia    Type II diabetes mellitus    Hx of appendectomy    Gastrostomy in place    Tracheostomy in place    Tracheostomy tube present    Feeding by G-tube          Medication list         MEDICATIONS  (STANDING):  chlorhexidine 0.12% Liquid 15 milliLiter(s) Oral Mucosa every 12 hours  collagenase Ointment 1 Application(s) Topical daily  dextrose 5%. 1000 milliLiter(s) (50 mL/Hr) IV Continuous <Continuous>  dextrose 5%. 1000 milliLiter(s) (100 mL/Hr) IV Continuous <Continuous>  dextrose 50% Injectable 25 Gram(s) IV Push once  dextrose 50% Injectable 12.5 Gram(s) IV Push once  dextrose 50% Injectable 25 Gram(s) IV Push once  folic acid 1 milliGRAM(s) Oral daily  furosemide   Injectable 40 milliGRAM(s) IV Push daily  glucagon  Injectable 1 milliGRAM(s) IntraMuscular once  heparin   Injectable 5000 Unit(s) SubCutaneous every 8 hours  insulin glargine Injectable (LANTUS) 8 Unit(s) SubCutaneous every morning  insulin lispro (ADMELOG) corrective regimen sliding scale   SubCutaneous every 6 hours  lactobacillus acidophilus 1 Tablet(s) Oral daily  LORazepam     Tablet 1 milliGRAM(s) Oral every 4 hours  methocarbamol 500 milliGRAM(s) Oral two times a day  multivitamin 1 Tablet(s) Oral daily  nystatin Powder 1 Application(s) Topical every 12 hours  pantoprazole  Injectable 40 milliGRAM(s) IV Push daily  povidone iodine 10% Solution 1 Application(s) Topical daily  senna 3 Tablet(s) Oral at bedtime  simethicone 80 milliGRAM(s) Chew every 6 hours    MEDICATIONS  (PRN):  acetaminophen    Suspension .. 650 milliGRAM(s) Oral every 6 hours PRN Mild Pain (1 - 3), Moderate Pain (4 - 6)  albuterol/ipratropium for Nebulization 3 milliLiter(s) Nebulizer every 6 hours PRN Shortness of Breath and/or Wheezing  dextrose Oral Gel 15 Gram(s) Oral once PRN Blood Glucose LESS THAN 70 milliGRAM(s)/deciliter  LORazepam   Injectable 2 milliGRAM(s) IV Push every 4 hours PRN Agitation         Vitals log        ICU Vital Signs Last 24 Hrs  T(C): 36.5 (02 Jun 2022 03:55), Max: 37.1 (01 Jun 2022 08:45)  T(F): 97.7 (02 Jun 2022 03:55), Max: 98.7 (01 Jun 2022 08:45)  HR: 85 (02 Jun 2022 06:39) (60 - 85)  BP: 134/62 (02 Jun 2022 06:39) (101/61 - 135/78)  BP(mean): 83 (02 Jun 2022 06:39) (70 - 90)  ABP: --  ABP(mean): --  RR: 29 (02 Jun 2022 06:39) (18 - 29)  SpO2: 90% (02 Jun 2022 06:39) (90% - 100%)       Mode: AC/ CMV (Assist Control/ Continuous Mandatory Ventilation)  RR (machine): 24  TV (machine): 400  FiO2: 40  PEEP: 5  ITime: 0.9  MAP: 16  PIP: 34      Input and Output:  I&O's Detail    01 Jun 2022 07:01  -  02 Jun 2022 06:42  --------------------------------------------------------  IN:    Glucerna 1.5: 360 mL  Total IN: 360 mL    OUT:  Total OUT: 0 mL    Total NET: 360 mL          Lab Data                        10.2   9.74  )-----------( 218      ( 01 Jun 2022 22:18 )             34.7     06-01    143  |  111<H>  |  37<H>  ----------------------------<  184<H>  5.4<H>   |  25  |  1.13    Ca    9.3      01 Jun 2022 22:18    TPro  7.6  /  Alb  2.2<L>  /  TBili  0.6  /  DBili  x   /  AST  17  /  ALT  18  /  AlkPhos  139<H>  06-01    ABG - ( 01 Jun 2022 22:14 )  pH, Arterial: 7.47  pH, Blood: x     /  pCO2: 36    /  pO2: 261   / HCO3: 26    / Base Excess: 2.5   /  SaO2: 100.0                   Review of Systems	      Objective     Physical Examination    heart s1s2  lung dec BS  abd soft      Pertinent Lab findings & Imaging      Annalise:  NO   Adequate UO     I&O's Detail    01 Jun 2022 07:01  -  02 Jun 2022 06:42  --------------------------------------------------------  IN:    Glucerna 1.5: 360 mL  Total IN: 360 mL    OUT:  Total OUT: 0 mL    Total NET: 360 mL               Discussed with:     Cultures:	        Radiology

## 2022-06-03 DIAGNOSIS — L89.154 PRESSURE ULCER OF SACRAL REGION, STAGE 4: ICD-10-CM

## 2022-06-03 LAB
ALBUMIN SERPL ELPH-MCNC: 2.2 G/DL — LOW (ref 3.3–5)
ALP SERPL-CCNC: 145 U/L — HIGH (ref 30–120)
ALT FLD-CCNC: 16 U/L DA — SIGNIFICANT CHANGE UP (ref 10–60)
ANION GAP SERPL CALC-SCNC: 7 MMOL/L — SIGNIFICANT CHANGE UP (ref 5–17)
APPEARANCE UR: CLEAR — SIGNIFICANT CHANGE UP
AST SERPL-CCNC: 12 U/L — SIGNIFICANT CHANGE UP (ref 10–40)
BASE EXCESS BLDA CALC-SCNC: 8.8 MMOL/L — HIGH (ref -2–3)
BILIRUB SERPL-MCNC: 0.7 MG/DL — SIGNIFICANT CHANGE UP (ref 0.2–1.2)
BILIRUB UR-MCNC: NEGATIVE — SIGNIFICANT CHANGE UP
BUN SERPL-MCNC: 39 MG/DL — HIGH (ref 7–23)
CALCIUM SERPL-MCNC: 8.9 MG/DL — SIGNIFICANT CHANGE UP (ref 8.4–10.5)
CHLORIDE SERPL-SCNC: 108 MMOL/L — SIGNIFICANT CHANGE UP (ref 96–108)
CO2 SERPL-SCNC: 29 MMOL/L — SIGNIFICANT CHANGE UP (ref 22–31)
COLOR SPEC: YELLOW — SIGNIFICANT CHANGE UP
CREAT SERPL-MCNC: 1.27 MG/DL — SIGNIFICANT CHANGE UP (ref 0.5–1.3)
DIFF PNL FLD: ABNORMAL
EGFR: 43 ML/MIN/1.73M2 — LOW
GLUCOSE SERPL-MCNC: 189 MG/DL — HIGH (ref 70–99)
GLUCOSE UR QL: NEGATIVE MG/DL — SIGNIFICANT CHANGE UP
HCO3 BLDA-SCNC: 32 MMOL/L — HIGH (ref 21–28)
HCT VFR BLD CALC: 38.1 % — SIGNIFICANT CHANGE UP (ref 34.5–45)
HGB BLD-MCNC: 11.3 G/DL — LOW (ref 11.5–15.5)
HOROWITZ INDEX BLDA+IHG-RTO: 40 — SIGNIFICANT CHANGE UP
INR BLD: 1.28 RATIO — HIGH (ref 0.88–1.16)
KETONES UR-MCNC: NEGATIVE — SIGNIFICANT CHANGE UP
LACTATE SERPL-SCNC: 1.2 MMOL/L — SIGNIFICANT CHANGE UP (ref 0.7–2)
LACTATE SERPL-SCNC: 1.5 MMOL/L — SIGNIFICANT CHANGE UP (ref 0.7–2)
LEUKOCYTE ESTERASE UR-ACNC: ABNORMAL
MCHC RBC-ENTMCNC: 26.3 PG — LOW (ref 27–34)
MCHC RBC-ENTMCNC: 29.7 GM/DL — LOW (ref 32–36)
MCV RBC AUTO: 88.6 FL — SIGNIFICANT CHANGE UP (ref 80–100)
NITRITE UR-MCNC: NEGATIVE — SIGNIFICANT CHANGE UP
NRBC # BLD: 0 /100 WBCS — SIGNIFICANT CHANGE UP (ref 0–0)
NT-PROBNP SERPL-SCNC: 4591 PG/ML — HIGH (ref 0–450)
PCO2 BLDA: 41 MMHG — HIGH (ref 32–35)
PH BLDA: 7.5 — HIGH (ref 7.35–7.45)
PH UR: 7 — SIGNIFICANT CHANGE UP (ref 5–8)
PHOSPHATE SERPL-MCNC: 3.9 MG/DL — SIGNIFICANT CHANGE UP (ref 2.5–4.5)
PLATELET # BLD AUTO: 259 K/UL — SIGNIFICANT CHANGE UP (ref 150–400)
PO2 BLDA: 62 MMHG — LOW (ref 83–108)
POTASSIUM SERPL-MCNC: 5.1 MMOL/L — SIGNIFICANT CHANGE UP (ref 3.5–5.3)
POTASSIUM SERPL-SCNC: 5.1 MMOL/L — SIGNIFICANT CHANGE UP (ref 3.5–5.3)
PROCALCITONIN SERPL-MCNC: 0.43 NG/ML — HIGH (ref 0.02–0.1)
PROT SERPL-MCNC: 7.8 G/DL — SIGNIFICANT CHANGE UP (ref 6–8.3)
PROT UR-MCNC: 30 MG/DL
PROTHROM AB SERPL-ACNC: 14.8 SEC — HIGH (ref 10.5–13.4)
RBC # BLD: 4.3 M/UL — SIGNIFICANT CHANGE UP (ref 3.8–5.2)
RBC # FLD: 21.2 % — HIGH (ref 10.3–14.5)
SAO2 % BLDA: 93.1 % — LOW (ref 94–98)
SODIUM SERPL-SCNC: 144 MMOL/L — SIGNIFICANT CHANGE UP (ref 135–145)
SP GR SPEC: 1 — LOW (ref 1.01–1.02)
TROPONIN I, HIGH SENSITIVITY RESULT: 12.3 NG/L — SIGNIFICANT CHANGE UP
UROBILINOGEN FLD QL: NEGATIVE MG/DL — SIGNIFICANT CHANGE UP
WBC # BLD: 11.99 K/UL — HIGH (ref 3.8–10.5)
WBC # FLD AUTO: 11.99 K/UL — HIGH (ref 3.8–10.5)

## 2022-06-03 PROCEDURE — 99232 SBSQ HOSP IP/OBS MODERATE 35: CPT

## 2022-06-03 PROCEDURE — 99222 1ST HOSP IP/OBS MODERATE 55: CPT

## 2022-06-03 RX ORDER — IBUPROFEN 200 MG
800 TABLET ORAL ONCE
Refills: 0 | Status: DISCONTINUED | OUTPATIENT
Start: 2022-06-03 | End: 2022-06-03

## 2022-06-03 RX ORDER — PIPERACILLIN AND TAZOBACTAM 4; .5 G/20ML; G/20ML
3.38 INJECTION, POWDER, LYOPHILIZED, FOR SOLUTION INTRAVENOUS ONCE
Refills: 0 | Status: COMPLETED | OUTPATIENT
Start: 2022-06-03 | End: 2022-06-03

## 2022-06-03 RX ORDER — KETOROLAC TROMETHAMINE 30 MG/ML
15 SYRINGE (ML) INJECTION ONCE
Refills: 0 | Status: DISCONTINUED | OUTPATIENT
Start: 2022-06-03 | End: 2022-06-03

## 2022-06-03 RX ORDER — ACETAMINOPHEN 500 MG
650 TABLET ORAL EVERY 6 HOURS
Refills: 0 | Status: DISCONTINUED | OUTPATIENT
Start: 2022-06-03 | End: 2022-06-10

## 2022-06-03 RX ORDER — PIPERACILLIN AND TAZOBACTAM 4; .5 G/20ML; G/20ML
3.38 INJECTION, POWDER, LYOPHILIZED, FOR SOLUTION INTRAVENOUS EVERY 8 HOURS
Refills: 0 | Status: COMPLETED | OUTPATIENT
Start: 2022-06-03 | End: 2022-06-10

## 2022-06-03 RX ADMIN — Medication 650 MILLIGRAM(S): at 09:21

## 2022-06-03 RX ADMIN — Medication 15 MILLIGRAM(S): at 18:11

## 2022-06-03 RX ADMIN — SIMETHICONE 80 MILLIGRAM(S): 80 TABLET, CHEWABLE ORAL at 00:34

## 2022-06-03 RX ADMIN — SIMETHICONE 80 MILLIGRAM(S): 80 TABLET, CHEWABLE ORAL at 05:34

## 2022-06-03 RX ADMIN — INSULIN GLARGINE 8 UNIT(S): 100 INJECTION, SOLUTION SUBCUTANEOUS at 08:28

## 2022-06-03 RX ADMIN — Medication 40 MILLIGRAM(S): at 05:34

## 2022-06-03 RX ADMIN — Medication 2 MILLIGRAM(S): at 02:28

## 2022-06-03 RX ADMIN — Medication 2 MILLIGRAM(S): at 14:59

## 2022-06-03 RX ADMIN — Medication 1 TABLET(S): at 12:38

## 2022-06-03 RX ADMIN — HEPARIN SODIUM 5000 UNIT(S): 5000 INJECTION INTRAVENOUS; SUBCUTANEOUS at 21:33

## 2022-06-03 RX ADMIN — Medication 2 MILLIGRAM(S): at 09:45

## 2022-06-03 RX ADMIN — NYSTATIN CREAM 1 APPLICATION(S): 100000 CREAM TOPICAL at 05:35

## 2022-06-03 RX ADMIN — PIPERACILLIN AND TAZOBACTAM 200 GRAM(S): 4; .5 INJECTION, POWDER, LYOPHILIZED, FOR SOLUTION INTRAVENOUS at 17:42

## 2022-06-03 RX ADMIN — Medication 1 APPLICATION(S): at 12:45

## 2022-06-03 RX ADMIN — Medication 1 TABLET(S): at 13:01

## 2022-06-03 RX ADMIN — HEPARIN SODIUM 5000 UNIT(S): 5000 INJECTION INTRAVENOUS; SUBCUTANEOUS at 05:35

## 2022-06-03 RX ADMIN — Medication 2 MILLIGRAM(S): at 07:05

## 2022-06-03 RX ADMIN — HEPARIN SODIUM 5000 UNIT(S): 5000 INJECTION INTRAVENOUS; SUBCUTANEOUS at 14:58

## 2022-06-03 RX ADMIN — Medication 15 MILLIGRAM(S): at 17:42

## 2022-06-03 RX ADMIN — PANTOPRAZOLE SODIUM 40 MILLIGRAM(S): 20 TABLET, DELAYED RELEASE ORAL at 12:38

## 2022-06-03 RX ADMIN — Medication 650 MILLIGRAM(S): at 08:30

## 2022-06-03 RX ADMIN — CHLORHEXIDINE GLUCONATE 15 MILLILITER(S): 213 SOLUTION TOPICAL at 05:33

## 2022-06-03 RX ADMIN — Medication 650 MILLIGRAM(S): at 16:17

## 2022-06-03 RX ADMIN — Medication 2: at 01:13

## 2022-06-03 RX ADMIN — Medication 2: at 05:32

## 2022-06-03 RX ADMIN — Medication 2: at 12:39

## 2022-06-03 RX ADMIN — Medication 2 MILLIGRAM(S): at 21:34

## 2022-06-03 RX ADMIN — Medication 1 APPLICATION(S): at 12:57

## 2022-06-03 RX ADMIN — Medication 2: at 17:51

## 2022-06-03 RX ADMIN — CHLORHEXIDINE GLUCONATE 15 MILLILITER(S): 213 SOLUTION TOPICAL at 17:46

## 2022-06-03 RX ADMIN — SIMETHICONE 80 MILLIGRAM(S): 80 TABLET, CHEWABLE ORAL at 12:38

## 2022-06-03 RX ADMIN — NYSTATIN CREAM 1 APPLICATION(S): 100000 CREAM TOPICAL at 17:46

## 2022-06-03 RX ADMIN — SENNA PLUS 3 TABLET(S): 8.6 TABLET ORAL at 21:34

## 2022-06-03 RX ADMIN — METHOCARBAMOL 500 MILLIGRAM(S): 500 TABLET, FILM COATED ORAL at 05:35

## 2022-06-03 RX ADMIN — Medication 1 MILLIGRAM(S): at 12:38

## 2022-06-03 NOTE — PROGRESS NOTE ADULT - SUBJECTIVE AND OBJECTIVE BOX
[facility] DIVISION of INFECTIOUS DISEASE  Arturo Olivas MD PhD, Haylee Umanzor MD, Andressa Brantley MD, Billy Valenzuela MD, Emil Medellin MD  and providing coverage with Eusebio Chairez MD  Providing Infectious Disease Consultations at [facility], [facility], [facility]'s    Office# [phone] to schedule follow up appointments  Answering Service for urgent calls or New Consults [phone]  Cell# to text for urgent issues Arturo Olivas [phone]     infectious diseases progress note:    BREA BECKHAM is a 76y y. o. Female patient    Fever to 101 this am    Allergies    codeine (Hives)    Intolerances        ANTIBIOTICS/RELEVANT:  antimicrobials    immunologic:    OTHER:  acetaminophen    Suspension .. 650 milliGRAM(s) Oral every 6 hours PRN  albuterol/ipratropium for Nebulization 3 milliLiter(s) Nebulizer every 6 hours PRN  chlorhexidine 0.12% Liquid 15 milliLiter(s) Oral Mucosa every 12 hours  collagenase Ointment 1 Application(s) Topical daily  dextrose 5%. 1000 milliLiter(s) IV Continuous <Continuous>  dextrose 5%. 1000 milliLiter(s) IV Continuous <Continuous>  dextrose 50% Injectable 25 Gram(s) IV Push once  dextrose 50% Injectable 12.5 Gram(s) IV Push once  dextrose 50% Injectable 25 Gram(s) IV Push once  dextrose Oral Gel 15 Gram(s) Oral once PRN  folic acid 1 milliGRAM(s) Oral daily  furosemide   Injectable 40 milliGRAM(s) IV Push daily  glucagon  Injectable 1 milliGRAM(s) IntraMuscular once  heparin   Injectable 5000 Unit(s) SubCutaneous every 8 hours  insulin glargine Injectable (LANTUS) 8 Unit(s) SubCutaneous every morning  insulin lispro (ADMELOG) corrective regimen sliding scale   SubCutaneous every 6 hours  lactobacillus acidophilus 1 Tablet(s) Oral daily  LORazepam     Tablet 2 milliGRAM(s) Oral every 4 hours  LORazepam   Injectable 2 milliGRAM(s) IV Push every 4 hours PRN  methocarbamol 500 milliGRAM(s) Oral two times a day  multivitamin 1 Tablet(s) Oral daily  nystatin Powder 1 Application(s) Topical every 12 hours  pantoprazole  Injectable 40 milliGRAM(s) IV Push daily  povidone iodine 10% Solution 1 Application(s) Topical daily  senna 3 Tablet(s) Oral at bedtime  simethicone 80 milliGRAM(s) Chew every 6 hours      Objective:  Vital Signs Last 24 Hrs  T(C): 38.3 (03 Jun 2022 08:28), Max: 38.3 (03 Jun 2022 08:28)  T(F): 101 (03 Jun 2022 08:28), Max: 101 (03 Jun 2022 08:28)  HR: 87 (03 Jun 2022 08:00) (76 - 92)  BP: 97/52 (03 Jun 2022 08:00) (97/52 - 141/93)  BP(mean): 67 (03 Jun 2022 08:00) (67 - 106)  RR: 24 (03 Jun 2022 08:00) (19 - 47)  SpO2: 95% (03 Jun 2022 08:00) (89% - 100%)    T(C): 38.3 (06-03-22 @ 08:28), Max: 38.3 (06-03-22 @ 08:28)  T(C): 38.3 (06-03-22 @ 08:28), Max: 38.3 (06-03-22 @ 08:28)  T(C): 38.3 (06-03-22 @ 08:28), Max: 38.3 (06-03-22 @ 08:28)    PHYSICAL EXAM:  HEENT: NC atraumatic  Neck: supple, on vent via trach  Respiratory: no accessory muscle use, breathing comfortably  Cardiovascular: distant  Gastrointestinal: normal appearing, nondistended  Extremities: no clubbing, no cyanosis,    Mode: AC/ CMV (Assist Control/ Continuous Mandatory Ventilation), RR (machine): 24, TV (machine): 400, FiO2: 40, PEEP: 5, ITime: 0.9, MAP: 13, PIP: 26    LABS:                          11.3   11.99 )-----------( 259      ( 03 Jun 2022 06:06 )             38.1       WBC  11.99 06-03 @ 06:06  7.59 06-02 @ 06:44  9.74 06-01 @ 22:18  7.53 06-01 @ 06:34  7.50 05-31 @ 06:19  9.68 05-30 @ 06:28  11.87 05-29 @ 06:43  9.45 05-28 @ 07:05      06-03    144  |  108  |  39<H>  ----------------------------<  189<H>  5.1   |  29  |  1.27    Ca    8.9      03 Jun 2022 06:06  Phos  3.9     06-03  Mg     2.7     06-02    TPro  7.8  /  Alb  2.2<L>  /  TBili  0.7  /  DBili  x   /  AST  12  /  ALT  16  /  AlkPhos  145<H>  06-03      Creatinine, Serum: 1.27 mg/dL (06-03-22 @ 06:06)  Creatinine, Serum: 1.15 mg/dL (06-02-22 @ 06:44)  Creatinine, Serum: 1.13 mg/dL (06-01-22 @ 22:18)  Creatinine, Serum: 1.08 mg/dL (06-01-22 @ 06:34)  Creatinine, Serum: 1.07 mg/dL (05-31-22 @ 06:19)  Creatinine, Serum: 1.21 mg/dL (05-30-22 @ 06:28)  Creatinine, Serum: 1.13 mg/dL (05-29-22 @ 06:43)  Creatinine, Serum: 1.26 mg/dL (05-28-22 @ 07:05)      PT/INR - ( 03 Jun 2022 06:06 )   PT: 14.8 sec;   INR: 1.28 ratio                   INFLAMMATORY MARKERS  Auto Neutrophil #: 8.07 K/uL (06-01-22 @ 22:18)  Auto Lymphocyte #: 0.65 K/uL (06-01-22 @ 22:18)  Auto Neutrophil #: 5.79 K/uL (05-31-22 @ 06:19)  Auto Lymphocyte #: 0.76 K/uL (05-31-22 @ 06:19)  Auto Neutrophil #: 8.00 K/uL (05-28-22 @ 07:05)  Auto Lymphocyte #: 0.78 K/uL (05-28-22 @ 07:05)  Auto Lymphocyte #: 0.49 K/uL (05-26-22 @ 06:04)  Auto Neutrophil #: 10.06 K/uL (05-26-22 @ 06:04)  Auto Neutrophil #: 11.08 K/uL (05-23-22 @ 06:44)  Auto Lymphocyte #: 1.20 K/uL (05-23-22 @ 06:44)  Auto Neutrophil #: 10.55 K/uL (05-22-22 @ 06:23)  Auto Lymphocyte #: 1.67 K/uL (05-22-22 @ 06:23)  Auto Neutrophil #: 18.42 K/uL (05-21-22 @ 22:30)  Auto Lymphocyte #: 0.82 K/uL (05-21-22 @ 22:30)  Auto Neutrophil #: 20.65 K/uL (05-21-22 @ 20:28)  Auto Lymphocyte #: 1.84 K/uL (05-21-22 @ 20:28)    Lactate, Blood: 1.2 mmol/L (06-03-22 @ 06:06)  Lactate, Blood: 1.3 mmol/L (06-01-22 @ 22:46)  Lactate, Blood: 1.6 mmol/L (05-29-22 @ 06:43)  Lactate, Blood: 1.3 mmol/L (05-23-22 @ 06:44)  Lactate, Blood: 1.6 mmol/L (05-22-22 @ 15:05)  Lactate, Blood: 3.6 mmol/L (05-22-22 @ 10:01)  Lactate, Blood: 3.4 mmol/L (05-22-22 @ 06:23)  Lactate, Blood: 1.9 mmol/L (05-21-22 @ 20:28)    Auto Eosinophil #: 0.04 K/uL (06-01-22 @ 22:18)  Auto Eosinophil #: 0.15 K/uL (05-31-22 @ 06:19)  Auto Eosinophil #: 0.06 K/uL (05-28-22 @ 07:05)  Auto Eosinophil #: 0.14 K/uL (05-26-22 @ 06:04)  Auto Eosinophil #: 0.12 K/uL (05-23-22 @ 06:44)  Auto Eosinophil #: 0.04 K/uL (05-22-22 @ 06:23)  Auto Eosinophil #: 0.09 K/uL (05-21-22 @ 22:30)  Auto Eosinophil #: 0.00 K/uL (05-21-22 @ 20:28)    Lactate Dehydrogenase, Serum: 381 U/L (05-24-22 @ 06:56)      Procalcitonin, Serum: 0.67 ng/mL (05-29-22 @ 06:43)  Procalcitonin, Serum: 1.40 ng/mL (05-23-22 @ 06:44)            Ferritin, Serum: 565 ng/mL (05-23-22 @ 11:49)        INR: 1.28 ratio (06-03-22 @ 06:06)  INR: 1.23 ratio (05-29-22 @ 06:43)  INR: 1.20 ratio (05-24-22 @ 06:56)  INR: 1.29 ratio (05-23-22 @ 06:44)  Activated Partial Thromboplastin Time: 34.5 sec (05-22-22 @ 01:22)  INR: 1.23 ratio (05-22-22 @ 01:22)  Activated Partial Thromboplastin Time: 37.4 sec (05-21-22 @ 20:28)  INR: 1.17 ratio (05-21-22 @ 20:28)          MICROBIOLOGY:    Culture - Sputum . (06.02.22 @ 14:57)    Gram Stain:   Rare polymorphonuclear leukocytes per low power field  Rare Squamous epithelial cells per low power field  Moderate Gram Negative Rods per oil power field  Moderate Gram Negative Coccobacilli per oil power field    Specimen Source: Trach Asp Tracheal Aspirate    Culture - Sputum (05.23.22 @ 11:20)    -  Levofloxacin: S <=0.5    -  Meropenem: R 8    -  Piperacillin/Tazobactam: S 16    -  Tobramycin: S <=2    -  Cefepime: S 4    -  Ceftazidime: S 8    -  Ciprofloxacin: S <=0.25    -  Gentamicin: S <=2    -  Imipenem: R >8    Gram Stain:   Moderate polymorphonuclear leukocytes per low power field  No Squamous epithelial cells per low power field  Moderate Gram Negative Rods per oil power field    -  Amikacin: S <=16    -  Aztreonam: S <=4    Specimen Source: Trach Asp Tracheal Aspirate    Culture Results:   Numerous Mixed gram negative rods including  Numerous Pseudomonas aeruginosa (Carbapenem Resistant)  Normal Respiratory Elvira absent    Organism Identification: Pseudomonas aeruginosa (Carbapenem Resistant)    Organism: Pseudomonas aeruginosa (Carbapenem Resistant)    Method Type: PRATIK        RADIOLOGY & ADDITIONAL STUDIES:

## 2022-06-03 NOTE — PROGRESS NOTE ADULT - SUBJECTIVE AND OBJECTIVE BOX
Subjective: Patient seen and examined. Had fever.     MEDICATIONS  (STANDING):  chlorhexidine 0.12% Liquid 15 milliLiter(s) Oral Mucosa every 12 hours  collagenase Ointment 1 Application(s) Topical daily  dextrose 5%. 1000 milliLiter(s) (50 mL/Hr) IV Continuous <Continuous>  dextrose 5%. 1000 milliLiter(s) (100 mL/Hr) IV Continuous <Continuous>  dextrose 50% Injectable 25 Gram(s) IV Push once  dextrose 50% Injectable 12.5 Gram(s) IV Push once  dextrose 50% Injectable 25 Gram(s) IV Push once  folic acid 1 milliGRAM(s) Oral daily  furosemide   Injectable 40 milliGRAM(s) IV Push daily  glucagon  Injectable 1 milliGRAM(s) IntraMuscular once  heparin   Injectable 5000 Unit(s) SubCutaneous every 8 hours  insulin glargine Injectable (LANTUS) 8 Unit(s) SubCutaneous every morning  insulin lispro (ADMELOG) corrective regimen sliding scale   SubCutaneous every 6 hours  lactobacillus acidophilus 1 Tablet(s) Oral daily  LORazepam     Tablet 2 milliGRAM(s) Oral every 4 hours  methocarbamol 500 milliGRAM(s) Oral two times a day  multivitamin 1 Tablet(s) Oral daily  nystatin Powder 1 Application(s) Topical every 12 hours  pantoprazole  Injectable 40 milliGRAM(s) IV Push daily  povidone iodine 10% Solution 1 Application(s) Topical daily  senna 3 Tablet(s) Oral at bedtime  simethicone 80 milliGRAM(s) Chew every 6 hours    MEDICATIONS  (PRN):  acetaminophen    Suspension .. 650 milliGRAM(s) Oral every 6 hours PRN Mild Pain (1 - 3), Moderate Pain (4 - 6)  albuterol/ipratropium for Nebulization 3 milliLiter(s) Nebulizer every 6 hours PRN Shortness of Breath and/or Wheezing  dextrose Oral Gel 15 Gram(s) Oral once PRN Blood Glucose LESS THAN 70 milliGRAM(s)/deciliter  LORazepam   Injectable 2 milliGRAM(s) IV Push every 4 hours PRN Agitation      Allergies    codeine (Hives)    Intolerances        Vital Signs Last 24 Hrs  T(C): 38.3 (03 Jun 2022 08:28), Max: 38.3 (03 Jun 2022 08:28)  T(F): 101 (03 Jun 2022 08:28), Max: 101 (03 Jun 2022 08:28)  HR: 78 (03 Jun 2022 10:28) (77 - 92)  BP: 97/52 (03 Jun 2022 08:00) (97/52 - 141/93)  BP(mean): 67 (03 Jun 2022 08:00) (67 - 106)  RR: 24 (03 Jun 2022 08:00) (19 - 47)  SpO2: 97% (03 Jun 2022 10:28) (89% - 100%)    PHYSICAL EXAM:  GENERAL: chronically ill appearing, emaciated, NAD, obtunded  HEAD:  atraumatic  EYES: conjunctiva clear  NECK: (+)trach in place, clean  RESPIRATORY: mechanical breath sounds, vent in place, no gross crackles or rales  CARDIOVASCULAR:  regular rate and rhythm, no murmurs or rubs or gallops, 2+ peripheral pulses  GASTROINTESTINAL:  soft, +PEG in place, clean and dry as well, nondistended, bowel sounds present  EXTREMITIES: no clubbing or cyanosis or edema  MUSCULOSKELETAL:  (+)diffuse contractures in all joints  NERVOUS SYSTEM: does not respond to lesia      LABS:                        11.3   11.99 )-----------( 259      ( 03 Jun 2022 06:06 )             38.1     03 Jun 2022 06:06    144    |  108    |  39     ----------------------------<  189    5.1     |  29     |  1.27     Ca    8.9        03 Jun 2022 06:06  Phos  3.9       03 Jun 2022 06:06    TPro  7.8    /  Alb  2.2    /  TBili  0.7    /  DBili  x      /  AST  12     /  ALT  16     /  AlkPhos  145    03 Jun 2022 06:06    PT/INR - ( 03 Jun 2022 06:06 )   PT: 14.8 sec;   INR: 1.28 ratio             CAPILLARY BLOOD GLUCOSE      POCT Blood Glucose.: 186 mg/dL (03 Jun 2022 08:15)  POCT Blood Glucose.: 190 mg/dL (03 Jun 2022 05:30)  POCT Blood Glucose.: 171 mg/dL (03 Jun 2022 00:37)  POCT Blood Glucose.: 180 mg/dL (02 Jun 2022 18:02)  POCT Blood Glucose.: 178 mg/dL (02 Jun 2022 11:53)      RADIOLOGY & ADDITIONAL TESTS:    Imaging Personally Reviewed:  [ ] YES     Consultant(s) Notes Reviewed:      Care Discussed with Consultants/Other Providers:    Advanced Directives: [ ] DNR  [ ] No feeding tube  [ ] MOLST in chart  [ ] MOLST completed today  [ ] Unknown

## 2022-06-03 NOTE — PROGRESS NOTE ADULT - ASSESSMENT
The patient is a 76 year old female with a history of HTN, DM, CVA, dementia, chronic respiratory failure s/p trach, PEG, cardiac arrest, anemia who presents with respiratory distress.    Plan:  - Echo 5/30/22 with normal LV systolic function, mod pulm HTN  - Cardiac enzymes negative  - CT chest with moderate bilateral pleural effusions  - Pleural effusions last admission consistent with exudate  - Vent settings weaned down to normal overnight  - Lower furosemide to via PEG or discontinue  - Plan for repeat thoracentesis  - Pulm follow-up

## 2022-06-03 NOTE — PROGRESS NOTE ADULT - ASSESSMENT
REVIEW OF SYMPTOMS      Able to give (reliable) ROS  NO     PHYSICAL EXAM    HEENT Unremarkable  atraumatic   RESP Fair air entry EXP prolonged    Harsh breath sound Resp distres mild   CARDIAC S1 S2 No S3     NO JVD    ABDOMEN SOFT BS PRESENT NOT DISTENDED No hepatosplenomegaly   PEDAL EDEMA present No calf tenderness  NO rash       AGE/SEX.   76 f  DOA.  6/2/2022  CC .  6/2/2022 Resp distress at [facility]  PMH .  [facility] Hosp stay 5/21-6/1/2022 ceftazidime avibactam 1.25x2 5/22- 5/30/2022    [facility] Hosp stay given zosyn 4/21  pmh Pleural effsn   5/25/2022 1.5 l clear pl effsn removed left side IR  5/25/2022 g 183 l 144/381 .37 p 3.4/5.3 .64 p 23 l 36   5/25/2022l pl fluid  lymph pred exudate   cyto (-)     pmh TRANSFUSION.  5/23/2022 1 u prbc  pmh Pneumonia    pmh HTN,   pmh DM,   pmh CVA,   pmh  chronic respiratory failure   pmh s/p trach, PEG,   pmh cardiac arrest      MAIN ISSUES .  CHF HFPEF AOC poa 6/1   VDRF pmh   FEVER 6/3/2022    COVID/ICU/CODE STATUS.                       COVID  STATUS.    6/2/2022 scv2 (-)        ICU STAY. 6/2/2022  GOC.  6/2/2022 full code     BEST PRACTICE ISSUES.                                                  HEAD OF BED ELEVATION. Yes  DVT PROPHYLAXIS.   6/1 hpsc    SQUIRES PROPHYLAXIS. 6/1 protonix 40                                                                                        DIET.  6/2/2022 glucerna 1.5 1440              VITALS/PO/IO/VENT/DRIPS.    6/3/2022 101 87 97/52   6/3/2022 id aware of fever   6/3/903606/400/5/.4      PROBLEM DATA/ASSESSMENT RECOMMENDATIONS (A/R).    HEMODYNAMICS.   Monitor and optimize bp   Target MAP to miguel  65 (+)    RESP.   Monitor and optimize  po   Target po to remain 90-95%    ABG.   6/3/2022  6a 24/400/5/.4 750/41/62     PMH/PSH PROBLEMS.  Management continued/modified as indicated    VENT MANAGEMENT.   HOB elevation  Target Pplat 30 (-)  Target PO 90-95%  Target pH 730 (+)  Daily spontaneous breathing trials   Daily sedation vacation         INFECTION SOURCE DD.   INFECTION A/R.   No obvious new infection  Shje is status post recent course of abio ceftazidime avibactam 1.25x2 5/22- 5/30/2022    defer abio  ID eval     INFECTION AUGUST.  W 6/2-6/3/2022 w 7.5 - 11.9   IAMGING.   Cxr 6/1/2022 diffuse advanced bl infiltrates with moderate basal effsns increased from 5/31   ct chest 6/1/2022 Pulm edema Worsening bl effsns likely reflecting progressive chf Increased bl patchy airspace opac concerning for superimposed mf pneum   MICROBIO.  rvp 6/2 (-)   Trach 6/2 1) Mod gnr 2) Mod gn coccobacilli   ABIO.  6/3/2022 ID on case abio deferred     COPD.  6/1/2022 duoneb.4  CHF.  echo 5/30/2022 ef 65% pasp 60 dialted rv   6/2/2022 lasix 40  ANEMIA.  Hb 6/2-6/3/2022 Hb 10.6 - 11.3   RENAL.  Na 6/2-6/3/2022 Na 145 - 144  Cr 6/2-6/3/2022 Cr 1.1- 1.2   DM.   6/1 riss   6/1 lantus 8  ANXIETY.  6/2/2022 lorazepam 2.6   DECUB.   6/1 collagenase r gluteal wound     TIME SPENT   Over 39 minutes aggregate critical care time spent on encounter; activities included   direct patient care, counseling and/or coordinating care reviewing notes, lab data/ imaging , discussion with multidisciplinary team/ patient  /family and explaining in detail risks, benefits, alternatives  of the recommendations     CHAPINCITO Cooley f

## 2022-06-03 NOTE — PROGRESS NOTE ADULT - SUBJECTIVE AND OBJECTIVE BOX
Date/Time Patient Seen:  		  Referring MD:   Data Reviewed	       Patient is a 76y old  Female who presents with a chief complaint of Respiratory distress. (02 Jun 2022 21:23)      Subjective/HPI     PAST MEDICAL & SURGICAL HISTORY:  Dementia of frontal lobe type    Aphasic stroke    Diabetes mellitus    Respiratory failure    Hypertension    GERD (gastroesophageal reflux disease)    Constipation    Respiratory failure    CVA (cerebral vascular accident)    HTN (hypertension)    DM (diabetes mellitus)    Advanced dementia    COVID-19 virus detected    Quadriplegia    Pneumonia    Type II diabetes mellitus    Hx of appendectomy    Gastrostomy in place    Tracheostomy in place    Tracheostomy tube present    Feeding by G-tube          Medication list         MEDICATIONS  (STANDING):  chlorhexidine 0.12% Liquid 15 milliLiter(s) Oral Mucosa every 12 hours  collagenase Ointment 1 Application(s) Topical daily  dextrose 5%. 1000 milliLiter(s) (50 mL/Hr) IV Continuous <Continuous>  dextrose 5%. 1000 milliLiter(s) (100 mL/Hr) IV Continuous <Continuous>  dextrose 50% Injectable 25 Gram(s) IV Push once  dextrose 50% Injectable 12.5 Gram(s) IV Push once  dextrose 50% Injectable 25 Gram(s) IV Push once  folic acid 1 milliGRAM(s) Oral daily  furosemide   Injectable 40 milliGRAM(s) IV Push daily  glucagon  Injectable 1 milliGRAM(s) IntraMuscular once  heparin   Injectable 5000 Unit(s) SubCutaneous every 8 hours  insulin glargine Injectable (LANTUS) 8 Unit(s) SubCutaneous every morning  insulin lispro (ADMELOG) corrective regimen sliding scale   SubCutaneous every 6 hours  lactobacillus acidophilus 1 Tablet(s) Oral daily  LORazepam     Tablet 2 milliGRAM(s) Oral every 4 hours  methocarbamol 500 milliGRAM(s) Oral two times a day  multivitamin 1 Tablet(s) Oral daily  nystatin Powder 1 Application(s) Topical every 12 hours  pantoprazole  Injectable 40 milliGRAM(s) IV Push daily  povidone iodine 10% Solution 1 Application(s) Topical daily  senna 3 Tablet(s) Oral at bedtime  simethicone 80 milliGRAM(s) Chew every 6 hours    MEDICATIONS  (PRN):  acetaminophen    Suspension .. 650 milliGRAM(s) Oral every 6 hours PRN Mild Pain (1 - 3), Moderate Pain (4 - 6)  albuterol/ipratropium for Nebulization 3 milliLiter(s) Nebulizer every 6 hours PRN Shortness of Breath and/or Wheezing  dextrose Oral Gel 15 Gram(s) Oral once PRN Blood Glucose LESS THAN 70 milliGRAM(s)/deciliter  LORazepam   Injectable 2 milliGRAM(s) IV Push every 4 hours PRN Agitation         Vitals log        ICU Vital Signs Last 24 Hrs  T(C): 37.7 (03 Jun 2022 04:10), Max: 37.7 (03 Jun 2022 00:04)  T(F): 99.8 (03 Jun 2022 04:10), Max: 99.9 (03 Jun 2022 00:04)  HR: 83 (03 Jun 2022 05:20) (76 - 92)  BP: 114/55 (03 Jun 2022 02:00) (111/61 - 141/93)  BP(mean): 73 (03 Jun 2022 02:00) (73 - 106)  ABP: --  ABP(mean): --  RR: 24 (03 Jun 2022 02:00) (19 - 47)  SpO2: 97% (03 Jun 2022 05:20) (90% - 100%)       Mode: AC/ CMV (Assist Control/ Continuous Mandatory Ventilation)  RR (machine): 24  TV (machine): 400  FiO2: 40  PEEP: 5  ITime: 0.9  MAP: 15  PIP: 27      Input and Output:  I&O's Detail    01 Jun 2022 07:01  -  02 Jun 2022 07:00  --------------------------------------------------------  IN:    Glucerna 1.5: 420 mL  Total IN: 420 mL    OUT:  Total OUT: 0 mL    Total NET: 420 mL      02 Jun 2022 07:01  -  03 Jun 2022 06:30  --------------------------------------------------------  IN:    Glucerna 1.5: 420 mL  Total IN: 420 mL    OUT:    Voided (mL): 1000 mL  Total OUT: 1000 mL    Total NET: -580 mL          Lab Data                        11.3   11.99 )-----------( 259      ( 03 Jun 2022 06:06 )             38.1     06-02    145  |  110<H>  |  37<H>  ----------------------------<  205<H>  4.9   |  27  |  1.15    Ca    8.7      02 Jun 2022 06:44  Phos  5.2     06-02  Mg     2.7     06-02    TPro  7.6  /  Alb  2.2<L>  /  TBili  0.6  /  DBili  x   /  AST  17  /  ALT  18  /  AlkPhos  139<H>  06-01    ABG - ( 03 Jun 2022 06:24 )  pH, Arterial: 7.50  pH, Blood: x     /  pCO2: 41    /  pO2: 62    / HCO3: 32    / Base Excess: 8.8   /  SaO2: 93.1                    Review of Systems	      Objective     Physical Examination    heart s1s2  lung dec BS  abd soft      Pertinent Lab findings & Imaging      Annalise:  NO   Adequate UO     I&O's Detail    01 Jun 2022 07:01  -  02 Jun 2022 07:00  --------------------------------------------------------  IN:    Glucerna 1.5: 420 mL  Total IN: 420 mL    OUT:  Total OUT: 0 mL    Total NET: 420 mL      02 Jun 2022 07:01  -  03 Jun 2022 06:30  --------------------------------------------------------  IN:    Glucerna 1.5: 420 mL  Total IN: 420 mL    OUT:    Voided (mL): 1000 mL  Total OUT: 1000 mL    Total NET: -580 mL               Discussed with:     Cultures:	        Radiology

## 2022-06-03 NOTE — PROGRESS NOTE ADULT - ASSESSMENT
79F HTN, DM2, COPD, Chronic Respiratory Failure on Trach to Vent re-admitted for Acute Respiratory Distress    Acute Respiratory Distress  Patient discharged 6/1/22 and repeat CT showing increase pleural effusions  Going for repeat thoracentesis; Distress could be exacerbated by anxiety component as well  Treated with Abx for PNA on prior admission and had Thoracentesis done 1500cc on Left SIde   TTE- Normal LV, dilated RV, mod pulm htn, small pericardial effusion  Continue Lasix Daily  Pulmonary and Cardiology Input appreciated    Anemia of Chronic Disease with Iron Deficiency  Has been evaluated by GI and Hematology on prior admissions  She has Anemia of Chronic Disease but could also have intermittent GI Bleeding; Occult Blood Negative  S/P 2U PRBC Transfusion and IV Venofer on prior admission  Multivitamin daily    Acute Renal Failure on CKD 3  Baseline Cr around 1.2  Will continue gentle hydration and maintain BP   Renally dose and monitor BMP and electrolytes     HTN  Hold all BP lower agents    DM2  FS and on ISS to maintain BS <180    Diet  Tube Feeds    DVT Prophylaxis  Heparin    Disposition  Full Code

## 2022-06-03 NOTE — CONSULT NOTE ADULT - ASSESSMENT
peg dysfunction    plan  opeg is in stomach replaced  ppi once a day  gerd precautions  check electrolytes and supplement    Advanced care planning was discussed with patient and family.  Advanced care planning forms were reviewed and discussed.  Risks, benefits and alternatives of gastroenterologic procedures were discussed in detail and all questions were answered.    30 minutes spent.

## 2022-06-03 NOTE — CONSULT NOTE ADULT - ASSESSMENT
Patient presents with   1. Gluteal fold unstageable pressure injury 3 x 2 yellow slough 100% scant/moderate serosanguinous drainage no odor, periwound erythema  recommendation:   -Santyl daily  2. Sacral area scr tissue likley resolved stage 3/4 pressure injury  -as per prevention protocol  3. Left hallux unstageable pressure injury 1.5 x 1.5 dry, periwound dry mild erythema  recommendation:   -Continue Betadine daily  4. Right hallux unstageable pressure injury 1.5 x 1.5 dry, periwound dry mild erythema  recommendation:   -Continue Betadine daily  5. Right lateral foot unstageable pressure injury 1 x 1 dry, periwound dry mild erythema  recommendation:  -Continue Betadine daily  6. Right lateral/plantar foot unstageable pressure injury 1 x 1 dry, periwound dry mild erythema  recommendation:  -Continue Betadine daily  Patient is on a low air loss mattress  for the critically ill patient experiencing multiorgan failure, impaired tissue oxygenation, and perfusion can contribute to skin failure thus the development of a pressure related injury may be unavoidable    This pressure injury is community acquired/YES  At risk for altered tissue perfusion /YES  Impaired perfusion of peripheral tissue /YES  Continue  Nutrition (as tolerated)  Continue  Offloading   Continue Pericare  Apply cair boots at all times while in bed.   Provide skin checks and foot placement q8h.  Care as per medicine will follow w/ you  Follow [facility] as outpatient [facility]   Findings and recommendations discussed with BRANDEN Madrid  Thank you for this consult  Ana Luisa Cantu NP, [facility] [phone]

## 2022-06-03 NOTE — PROGRESS NOTE ADULT - SUBJECTIVE AND OBJECTIVE BOX
Chief Complaint: Respiratory distress    Interval Events: No events overnight.    Review of Systems:  Unable to obtain    Physical Exam:  Vitals:        Vital Signs Last 24 Hrs  T(C): 37.7 (03 Jun 2022 04:10), Max: 37.7 (03 Jun 2022 00:04)  T(F): 99.8 (03 Jun 2022 04:10), Max: 99.9 (03 Jun 2022 00:04)  HR: 87 (03 Jun 2022 08:00) (76 - 92)  BP: 97/52 (03 Jun 2022 08:00) (97/52 - 141/93)  BP(mean): 67 (03 Jun 2022 08:00) (67 - 106)  RR: 24 (03 Jun 2022 08:00) (19 - 47)  SpO2: 95% (03 Jun 2022 08:00) (89% - 100%)  General: NAD  HEENT: Trach  Neck: No JVD, no carotid bruit  Lungs: Coarse bilaterally  CV: RRR, nl S1/S2, no M/R/G  Abdomen: S/NT/ND, +BS  Extremities: No LE edema, no cyanosis  Neuro: AAOx0  Skin: No rash    Labs:                        11.3   11.99 )-----------( 259      ( 03 Jun 2022 06:06 )             38.1     06-03    144  |  108  |  39<H>  ----------------------------<  189<H>  5.1   |  29  |  1.27    Ca    8.9      03 Jun 2022 06:06  Phos  3.9     06-03  Mg     2.7     06-02    TPro  7.8  /  Alb  2.2<L>  /  TBili  0.7  /  DBili  x   /  AST  12  /  ALT  16  /  AlkPhos  145<H>  06-03        PT/INR - ( 03 Jun 2022 06:06 )   PT: 14.8 sec;   INR: 1.28 ratio             Telemetry: Sinus rhythm

## 2022-06-03 NOTE — PROVIDER CONTACT NOTE (OTHER) - BACKGROUND
Patient is chronic trach to vent patient, with tube feeding via peg tube. Patient obtunded, nonverbal

## 2022-06-03 NOTE — PROGRESS NOTE ADULT - ASSESSMENT
80 y/o F with PMH of HTN, DM type 2, Cardiac Arrest, CVA, COPD, Chronic Respiratory Failure Vent Dependant s/p trach & PEG, Multiple Hospital Admissions for Aspiration & vent associated PNA, COVID-19 Infection, Anemia with GI blood loss, GERD, and Advanced Dementia presented with acute respiratory distress.    on lasix  readmitted after less than 24 hr dc back to [facility]  I and O  monitor VS and HD  Cardio follow up    HCP Marciano -  - pt is full code  s/p emp ABX - broad spectrum for poss PNA  cvs rx regimen  bronchodilators  oral and skin care  assist with needs - ADL  monitor VS and HD  CT imaging reviewed  Old records reviewed  suction PRN  trach care  peg care  nutritional optimization  decubitus prevention

## 2022-06-03 NOTE — PROGRESS NOTE ADULT - ASSESSMENT
This is an 78 y/o F with PMH of HTN, DM type 2, Cardiac Arrest, CVA, COPD, Chronic Respiratory Failure Vent Dependant s/p trach & PEG, Multiple Hospital Admissions for Aspiration & vent associated PNA, COVID-19 Infection, Anemia with GI blood loss, GERD, and Advanced Dementia who was sent from [facility] facility for acute respiratory distress.     Sepsis 2/2 VAP/PNA c/b pleural effusions  Pleural Effusions: parapneumonic vs. empyema  - prior sputum 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  - s/p IR guided thoracentesis on 5/25    --pleural fluid cx NGTD  - repeat 5/25 CT chest w/ improved aeration  s/p avycaz  x7 day completed 5/28  S/p linezolid    6/1-discharged but then readmitted w resp distress  6/3-am noted fever and slight rise in wbc, sputum in lab  -will follow for any role of abx based on clinical course and sputum micro  C/w vent management per ICU  C/w monitoring off Abx    Thank you for consulting us and involving us in the management of this most interesting and challenging case.  We will follow along in the care of this patient. Please call us at [phone] or text me directly on my cell# at [phone] with any concerns.    Over the weekend Dr Chairez will be covering this patient so please contact him with any questions, concerns or new micro data.

## 2022-06-03 NOTE — PROGRESS NOTE ADULT - SUBJECTIVE AND OBJECTIVE BOX
ICU Progress Note    HPI:    S:    Pt seen and examined  HD #  Pt here for      Allergies    codeine (Hives)    Intolerances        MEDICATIONS  (STANDING):  chlorhexidine 0.12% Liquid 15 milliLiter(s) Oral Mucosa every 12 hours  collagenase Ointment 1 Application(s) Topical daily  dextrose 5%. 1000 milliLiter(s) (50 mL/Hr) IV Continuous <Continuous>  dextrose 5%. 1000 milliLiter(s) (100 mL/Hr) IV Continuous <Continuous>  dextrose 50% Injectable 25 Gram(s) IV Push once  dextrose 50% Injectable 12.5 Gram(s) IV Push once  dextrose 50% Injectable 25 Gram(s) IV Push once  folic acid 1 milliGRAM(s) Oral daily  furosemide   Injectable 40 milliGRAM(s) IV Push daily  glucagon  Injectable 1 milliGRAM(s) IntraMuscular once  heparin   Injectable 5000 Unit(s) SubCutaneous every 8 hours  insulin glargine Injectable (LANTUS) 8 Unit(s) SubCutaneous every morning  insulin lispro (ADMELOG) corrective regimen sliding scale   SubCutaneous every 6 hours  lactobacillus acidophilus 1 Tablet(s) Oral daily  LORazepam     Tablet 2 milliGRAM(s) Oral every 4 hours  methocarbamol 500 milliGRAM(s) Oral two times a day  multivitamin 1 Tablet(s) Oral daily  nystatin Powder 1 Application(s) Topical every 12 hours  pantoprazole  Injectable 40 milliGRAM(s) IV Push daily  piperacillin/tazobactam IVPB.. 3.375 Gram(s) IV Intermittent every 8 hours  povidone iodine 10% Solution 1 Application(s) Topical daily  senna 3 Tablet(s) Oral at bedtime  simethicone 80 milliGRAM(s) Chew every 6 hours    MEDICATIONS  (PRN):  acetaminophen    Suspension .. 650 milliGRAM(s) Oral every 6 hours PRN Temp greater or equal to 38C (100.4F), Mild Pain (1 - 3)  albuterol/ipratropium for Nebulization 3 milliLiter(s) Nebulizer every 6 hours PRN Shortness of Breath and/or Wheezing  dextrose Oral Gel 15 Gram(s) Oral once PRN Blood Glucose LESS THAN 70 milliGRAM(s)/deciliter  LORazepam   Injectable 2 milliGRAM(s) IV Push every 4 hours PRN Agitation      Drug Dosing Weight  Height (cm): 165.1 (01 Jun 2022 20:16)  Weight (kg): 54 (01 Jun 2022 20:16)  BMI (kg/m2): 19.8 (01 Jun 2022 20:16)  BSA (m2): 1.59 (01 Jun 2022 20:16)    PAST MEDICAL & SURGICAL HISTORY:  Dementia of frontal lobe type      Aphasic stroke      Diabetes mellitus      Respiratory failure      Hypertension      GERD (gastroesophageal reflux disease)      Constipation      Respiratory failure      CVA (cerebral vascular accident)      HTN (hypertension)      DM (diabetes mellitus)      Advanced dementia      COVID-19 virus detected      Quadriplegia      Pneumonia      Type II diabetes mellitus      Hx of appendectomy      Gastrostomy in place      Tracheostomy in place      Tracheostomy tube present      Feeding by G-tube          FAMILY HISTORY:  No pertinent family history in first degree relatives        ROS: See HPI; otherwise, all systems reviewed and negative.    O:    ICU Vital Signs Last 24 Hrs  T(C): 38.3 (03 Jun 2022 15:38), Max: 38.4 (03 Jun 2022 12:39)  T(F): 101 (03 Jun 2022 15:38), Max: 101.1 (03 Jun 2022 12:39)  HR: 65 (03 Jun 2022 19:00) (65 - 91)  BP: 107/48 (03 Jun 2022 18:00) (93/52 - 140/74)  BP(mean): 66 (03 Jun 2022 18:00) (60 - 95)  ABP: --  ABP(mean): --  RR: 24 (03 Jun 2022 19:00) (21 - 24)  SpO2: 96% (03 Jun 2022 19:00) (89% - 100%)      ABG - ( 03 Jun 2022 06:24 )  pH, Arterial: 7.50  pH, Blood: x     /  pCO2: 41    /  pO2: 62    / HCO3: 32    / Base Excess: 8.8   /  SaO2: 93.1                I&O's Detail    02 Jun 2022 07:01  -  03 Jun 2022 07:00  --------------------------------------------------------  IN:    Glucerna 1.5: 780 mL  Total IN: 780 mL    OUT:    Voided (mL): 1500 mL  Total OUT: 1500 mL    Total NET: -720 mL      03 Jun 2022 07:01  -  03 Jun 2022 19:54  --------------------------------------------------------  IN:    Glucerna 1.5: 480 mL  Total IN: 480 mL    OUT:  Total OUT: 0 mL    Total NET: 480 mL          Mode: AC/ CMV (Assist Control/ Continuous Mandatory Ventilation)  RR (machine): 24  TV (machine): 400  FiO2: 40  PEEP: 5  ITime: 0.9  MAP: 13  PIP: 26      PE:    Constitutional: Healthy M lying in bed in NAD.   Neck: No JVD, trachea midline.   Respiratory: CTA B/L good BS B/L no W/R/R.  Cardiovascular: S1S2+ RRR no M/R/G.  Gastrointestinal: Soft, NTND.  Extremities: No peripheral edema, No cyanosis, clubbing.  Neurological: Awake, conversive, alert, no gross deficits.  Skin: No rashes, warm, moist.    LABS:    CBC Full  -  ( 03 Jun 2022 06:06 )  WBC Count : 11.99 K/uL  RBC Count : 4.30 M/uL  Hemoglobin : 11.3 g/dL  Hematocrit : 38.1 %  Platelet Count - Automated : 259 K/uL  Mean Cell Volume : 88.6 fl  Mean Cell Hemoglobin : 26.3 pg  Mean Cell Hemoglobin Concentration : 29.7 gm/dL  Auto Neutrophil # : x  Auto Lymphocyte # : x  Auto Monocyte # : x  Auto Eosinophil # : x  Auto Basophil # : x  Auto Neutrophil % : x  Auto Lymphocyte % : x  Auto Monocyte % : x  Auto Eosinophil % : x  Auto Basophil % : x    06-03    144  |  108  |  39<H>  ----------------------------<  189<H>  5.1   |  29  |  1.27    Ca    8.9      03 Jun 2022 06:06  Phos  3.9     06-03  Mg     2.7     06-02    TPro  7.8  /  Alb  2.2<L>  /  TBili  0.7  /  DBili  x   /  AST  12  /  ALT  16  /  AlkPhos  145<H>  06-03    PT/INR - ( 03 Jun 2022 06:06 )   PT: 14.8 sec;   INR: 1.28 ratio             CAPILLARY BLOOD GLUCOSE      POCT Blood Glucose.: 172 mg/dL (03 Jun 2022 17:49)  POCT Blood Glucose.: 162 mg/dL (03 Jun 2022 12:37)  POCT Blood Glucose.: 186 mg/dL (03 Jun 2022 08:15)  POCT Blood Glucose.: 190 mg/dL (03 Jun 2022 05:30)  POCT Blood Glucose.: 171 mg/dL (03 Jun 2022 00:37)        LIVER FUNCTIONS - ( 03 Jun 2022 06:06 )  Alb: 2.2 g/dL / Pro: 7.8 g/dL / ALK PHOS: 145 U/L / ALT: 16 U/L DA / AST: 12 U/L / GGT: x               A:    76yFemale  HD #    Here for:    1.    P:    Neuro: GCS 15. Monitor for delirium.  Continue to optimize pain control. Serial Neurologic assessments.    HEENT: No issues.    CV: Continue hemodynamic monitoring    Pulm: Pulmonary toilet.  Continue incentive spirometer.  Chest PT.  Encourage OOB to chair and ambulation. Nebs. f/u ABG, CXR.    GI/Nutrition: Cont diet, bowel regimen.    /Renal: Monitor UOP. Monitor BMP.  Replete Lytes as needed.    HEME- Chemical and mechanical DVT ppx. f/u CBC. f/u coags as needed.    ID:  Cont abx. f/u Cx's.    Lines/Tubes:     Endo: Maintain euglycemia.    Skin:  Cont skin care, pressure ulcer prevention.    Dispo: Cont care.       ICU Progress Note    S: pending IR drainage of pleural effusions. Stable otherwise.     Allergies    codeine (Hives)    Intolerances        MEDICATIONS  (STANDING):  chlorhexidine 0.12% Liquid 15 milliLiter(s) Oral Mucosa every 12 hours  collagenase Ointment 1 Application(s) Topical daily  dextrose 5%. 1000 milliLiter(s) (50 mL/Hr) IV Continuous <Continuous>  dextrose 5%. 1000 milliLiter(s) (100 mL/Hr) IV Continuous <Continuous>  dextrose 50% Injectable 25 Gram(s) IV Push once  dextrose 50% Injectable 12.5 Gram(s) IV Push once  dextrose 50% Injectable 25 Gram(s) IV Push once  folic acid 1 milliGRAM(s) Oral daily  furosemide   Injectable 40 milliGRAM(s) IV Push daily  glucagon  Injectable 1 milliGRAM(s) IntraMuscular once  heparin   Injectable 5000 Unit(s) SubCutaneous every 8 hours  insulin glargine Injectable (LANTUS) 8 Unit(s) SubCutaneous every morning  insulin lispro (ADMELOG) corrective regimen sliding scale   SubCutaneous every 6 hours  lactobacillus acidophilus 1 Tablet(s) Oral daily  LORazepam     Tablet 2 milliGRAM(s) Oral every 4 hours  methocarbamol 500 milliGRAM(s) Oral two times a day  multivitamin 1 Tablet(s) Oral daily  nystatin Powder 1 Application(s) Topical every 12 hours  pantoprazole  Injectable 40 milliGRAM(s) IV Push daily  piperacillin/tazobactam IVPB.. 3.375 Gram(s) IV Intermittent every 8 hours  povidone iodine 10% Solution 1 Application(s) Topical daily  senna 3 Tablet(s) Oral at bedtime  simethicone 80 milliGRAM(s) Chew every 6 hours    MEDICATIONS  (PRN):  acetaminophen    Suspension .. 650 milliGRAM(s) Oral every 6 hours PRN Temp greater or equal to 38C (100.4F), Mild Pain (1 - 3)  albuterol/ipratropium for Nebulization 3 milliLiter(s) Nebulizer every 6 hours PRN Shortness of Breath and/or Wheezing  dextrose Oral Gel 15 Gram(s) Oral once PRN Blood Glucose LESS THAN 70 milliGRAM(s)/deciliter  LORazepam   Injectable 2 milliGRAM(s) IV Push every 4 hours PRN Agitation      Drug Dosing Weight  Height (cm): 165.1 (01 Jun 2022 20:16)  Weight (kg): 54 (01 Jun 2022 20:16)  BMI (kg/m2): 19.8 (01 Jun 2022 20:16)  BSA (m2): 1.59 (01 Jun 2022 20:16)    PAST MEDICAL & SURGICAL HISTORY:  Dementia of frontal lobe type      Aphasic stroke      Diabetes mellitus      Respiratory failure      Hypertension      GERD (gastroesophageal reflux disease)      Constipation      Respiratory failure      CVA (cerebral vascular accident)      HTN (hypertension)      DM (diabetes mellitus)      Advanced dementia      COVID-19 virus detected      Quadriplegia      Pneumonia      Type II diabetes mellitus      Hx of appendectomy      Gastrostomy in place      Tracheostomy in place      Tracheostomy tube present      Feeding by G-tube          FAMILY HISTORY:  No pertinent family history in first degree relatives        ROS: See HPI; otherwise, all systems reviewed and negative.    O:    ICU Vital Signs Last 24 Hrs  T(C): 38.3 (03 Jun 2022 15:38), Max: 38.4 (03 Jun 2022 12:39)  T(F): 101 (03 Jun 2022 15:38), Max: 101.1 (03 Jun 2022 12:39)  HR: 65 (03 Jun 2022 19:00) (65 - 91)  BP: 107/48 (03 Jun 2022 18:00) (93/52 - 140/74)  BP(mean): 66 (03 Jun 2022 18:00) (60 - 95)  ABP: --  ABP(mean): --  RR: 24 (03 Jun 2022 19:00) (21 - 24)  SpO2: 96% (03 Jun 2022 19:00) (89% - 100%)      ABG - ( 03 Jun 2022 06:24 )  pH, Arterial: 7.50  pH, Blood: x     /  pCO2: 41    /  pO2: 62    / HCO3: 32    / Base Excess: 8.8   /  SaO2: 93.1                I&O's Detail    02 Jun 2022 07:01  -  03 Jun 2022 07:00  --------------------------------------------------------  IN:    Glucerna 1.5: 780 mL  Total IN: 780 mL    OUT:    Voided (mL): 1500 mL  Total OUT: 1500 mL    Total NET: -720 mL      03 Jun 2022 07:01  -  03 Jun 2022 19:54  --------------------------------------------------------  IN:    Glucerna 1.5: 480 mL  Total IN: 480 mL    OUT:  Total OUT: 0 mL    Total NET: 480 mL          Mode: AC/ CMV (Assist Control/ Continuous Mandatory Ventilation)  RR (machine): 24  TV (machine): 400  FiO2: 40  PEEP: 5  ITime: 0.9  MAP: 13  PIP: 26    LABS:    CBC Full  -  ( 03 Jun 2022 06:06 )  WBC Count : 11.99 K/uL  RBC Count : 4.30 M/uL  Hemoglobin : 11.3 g/dL  Hematocrit : 38.1 %  Platelet Count - Automated : 259 K/uL  Mean Cell Volume : 88.6 fl  Mean Cell Hemoglobin : 26.3 pg  Mean Cell Hemoglobin Concentration : 29.7 gm/dL  Auto Neutrophil # : x  Auto Lymphocyte # : x  Auto Monocyte # : x  Auto Eosinophil # : x  Auto Basophil # : x  Auto Neutrophil % : x  Auto Lymphocyte % : x  Auto Monocyte % : x  Auto Eosinophil % : x  Auto Basophil % : x    06-03    144  |  108  |  39<H>  ----------------------------<  189<H>  5.1   |  29  |  1.27    Ca    8.9      03 Jun 2022 06:06  Phos  3.9     06-03  Mg     2.7     06-02    TPro  7.8  /  Alb  2.2<L>  /  TBili  0.7  /  DBili  x   /  AST  12  /  ALT  16  /  AlkPhos  145<H>  06-03    PT/INR - ( 03 Jun 2022 06:06 )   PT: 14.8 sec;   INR: 1.28 ratio             CAPILLARY BLOOD GLUCOSE      POCT Blood Glucose.: 172 mg/dL (03 Jun 2022 17:49)  POCT Blood Glucose.: 162 mg/dL (03 Jun 2022 12:37)  POCT Blood Glucose.: 186 mg/dL (03 Jun 2022 08:15)  POCT Blood Glucose.: 190 mg/dL (03 Jun 2022 05:30)  POCT Blood Glucose.: 171 mg/dL (03 Jun 2022 00:37)        LIVER FUNCTIONS - ( 03 Jun 2022 06:06 )  Alb: 2.2 g/dL / Pro: 7.8 g/dL / ALK PHOS: 145 U/L / ALT: 16 U/L DA / AST: 12 U/L / GGT: x               A:    76yFemale  HD #    Here for:    1. hypoxemic resp failure  2. pleural effusions  3. diastolic heart failure  4. severe protein calorie malnutrition    P:    Pending repeat IR drainage   Pleurx catheters?  Cont aggressive diuresis with lasix  Diet as tolerated  PPI  Zosyn prophylactically   SQH for dvt ppx  Insulin for hyperglycemia

## 2022-06-03 NOTE — PROGRESS NOTE ADULT - SUBJECTIVE AND OBJECTIVE BOX
BREA BECKHAM    [facility]    Allergies    codeine (Hives)    Intolerances        PAST MEDICAL & SURGICAL HISTORY:  Dementia of frontal lobe type      Aphasic stroke      Diabetes mellitus      Respiratory failure      Hypertension      GERD (gastroesophageal reflux disease)      Constipation      Respiratory failure      CVA (cerebral vascular accident)      HTN (hypertension)      DM (diabetes mellitus)      Advanced dementia      COVID-19 virus detected      Quadriplegia      Pneumonia      Type II diabetes mellitus      Hx of appendectomy      Gastrostomy in place      Tracheostomy in place      Tracheostomy tube present      Feeding by G-tube          FAMILY HISTORY:  No pertinent family history in first degree relatives        Home Medications:  albuterol 90 mcg/inh inhalation aerosol with adapter: 2  inhaled every 6 hours (21 May 2022 21:16)  Bacid (LAC) oral tablet: 2 tab(s) by gastrostomy tube once a day (21 May 2022 21:16)  Betadine 10% topical swab: cleanse right and left great toes (21 May 2022 21:16)  Carafate 1 g/10 mL oral suspension: 10 milliliter(s) by gastrostomy tube 4 times a day (before meals and at bedtime) for 14 days (Started 6/4/21) (21 May 2022 21:16)  chlorhexidine 0.12% mucous membrane liquid: 15 milliliter(s) mucous membrane 2 times a day (21 May 2022 21:16)  Eucerin topical cream: Apply topically to affected area once a day bilateral feet (21 May 2022 21:16)  folic acid 1 mg oral tablet: 1 tab(s) orally once a day (21 May 2022 21:16)  insulin glargine 100 units/mL subcutaneous solution: 8 unit(s) subcutaneous once a day (in the morning) (01 Jun 2022 08:24)  ipratropium-albuterol 0.5 mg-2.5 mg/3 mL inhalation solution: 3 milliliter(s) inhaled 4 times a day (21 May 2022 21:16)  LORazepam 1 mg oral tablet: 1 tab(s) by gastrostomy tube every 4 hours (21 May 2022 21:16)  methocarbamol 500 mg oral tablet: 1 tab(s) by gastrostomy tube 2 times a day (21 May 2022 21:16)  MiraLax oral powder for reconstitution:  (21 May 2022 23:14)  Multiple Vitamins oral tablet: 1 tab(s) orally once a day (21 May 2022 21:16)  nystatin 100,000 units/g topical powder: 1 application topically 3 times a day (29 May 2022 16:35)  omeprazole 20 mg oral delayed release capsule: orally 2 times a day (21 May 2022 23:14)  polyethylene glycol 3350 oral powder for reconstitution: 17 gram(s) by gastrostomy tube every 12 hours (21 May 2022 21:16)  senna 8.6 mg oral tablet: 3 tab(s) by gastrostomy tube once a day (at bedtime) (21 May 2022 21:16)  simethicone 80 mg oral tablet, chewable: 1 tab(s) by gastrostomy tube every 6 hours (21 May 2022 21:16)  Tylenol 325 mg oral tablet: 2 tab(s) by gastrostomy tube once a day; 60 minutes prior to dressing change  (21 May 2022 21:16)  Tylenol 325 mg oral tablet: 2 tab(s) by gastrostomy tube every 6 hours, As Needed (21 May 2022 21:16)      MEDICATIONS  (STANDING):  chlorhexidine 0.12% Liquid 15 milliLiter(s) Oral Mucosa every 12 hours  collagenase Ointment 1 Application(s) Topical daily  dextrose 5%. 1000 milliLiter(s) (50 mL/Hr) IV Continuous <Continuous>  dextrose 5%. 1000 milliLiter(s) (100 mL/Hr) IV Continuous <Continuous>  dextrose 50% Injectable 25 Gram(s) IV Push once  dextrose 50% Injectable 12.5 Gram(s) IV Push once  dextrose 50% Injectable 25 Gram(s) IV Push once  folic acid 1 milliGRAM(s) Oral daily  furosemide   Injectable 40 milliGRAM(s) IV Push daily  glucagon  Injectable 1 milliGRAM(s) IntraMuscular once  heparin   Injectable 5000 Unit(s) SubCutaneous every 8 hours  insulin glargine Injectable (LANTUS) 8 Unit(s) SubCutaneous every morning  insulin lispro (ADMELOG) corrective regimen sliding scale   SubCutaneous every 6 hours  lactobacillus acidophilus 1 Tablet(s) Oral daily  LORazepam     Tablet 2 milliGRAM(s) Oral every 4 hours  methocarbamol 500 milliGRAM(s) Oral two times a day  multivitamin 1 Tablet(s) Oral daily  nystatin Powder 1 Application(s) Topical every 12 hours  pantoprazole  Injectable 40 milliGRAM(s) IV Push daily  povidone iodine 10% Solution 1 Application(s) Topical daily  senna 3 Tablet(s) Oral at bedtime  simethicone 80 milliGRAM(s) Chew every 6 hours    MEDICATIONS  (PRN):  acetaminophen    Suspension .. 650 milliGRAM(s) Oral every 6 hours PRN Mild Pain (1 - 3), Moderate Pain (4 - 6)  albuterol/ipratropium for Nebulization 3 milliLiter(s) Nebulizer every 6 hours PRN Shortness of Breath and/or Wheezing  dextrose Oral Gel 15 Gram(s) Oral once PRN Blood Glucose LESS THAN 70 milliGRAM(s)/deciliter  LORazepam   Injectable 2 milliGRAM(s) IV Push every 4 hours PRN Agitation      Diet, NPO with Tube Feed:   Tube Feeding Modality: Gastrostomy  Glucerna 1.5 Horacio  Total Volume for 24 Hours (mL): 1200  Continuous  Until Goal Tube Feed Rate (mL per Hour): 60  Tube Feed Duration (in Hours): 20  Tube Feed Start Time: 00:00  Free Water Flush  Pump   Rate (mL per Hour): 25   Frequency: Every Hour    Duration (Hours): 24 (06-02-22 @ 11:46) [Pending Verification By Attending]  Diet, NPO with Tube Feed:   Tube Feeding Modality: Gastrostomy  Glucerna 1.5 Horacio  Total Volume for 24 Hours (mL): 1440  Continuous  Starting Tube Feed Rate mL per Hour: 60  Until Goal Tube Feed Rate (mL per Hour): 60  Tube Feed Duration (in Hours): 24  Tube Feed Start Time: 00:00 (06-02-22 @ 10:31) [Active]          Vital Signs Last 24 Hrs  T(C): 37.7 (03 Jun 2022 04:10), Max: 37.7 (03 Jun 2022 00:04)  T(F): 99.8 (03 Jun 2022 04:10), Max: 99.9 (03 Jun 2022 00:04)  HR: 83 (03 Jun 2022 05:20) (76 - 92)  BP: 114/55 (03 Jun 2022 02:00) (111/61 - 141/93)  BP(mean): 73 (03 Jun 2022 02:00) (73 - 106)  RR: 24 (03 Jun 2022 02:00) (19 - 47)  SpO2: 97% (03 Jun 2022 05:20) (91% - 100%)      06-01-22 @ 07:01  -  06-02-22 @ 07:00  --------------------------------------------------------  IN: 420 mL / OUT: 0 mL / NET: 420 mL    06-02-22 @ 07:01  -  06-03-22 @ 06:40  --------------------------------------------------------  IN: 420 mL / OUT: 1000 mL / NET: -580 mL        Mode: AC/ CMV (Assist Control/ Continuous Mandatory Ventilation), RR (machine): 24, TV (machine): 400, FiO2: 40, PEEP: 5, ITime: 0.9, MAP: 15, PIP: 27      LABS:                        11.3   11.99 )-----------( 259      ( 03 Jun 2022 06:06 )             38.1     06-02    145  |  110<H>  |  37<H>  ----------------------------<  205<H>  4.9   |  27  |  1.15    Ca    8.7      02 Jun 2022 06:44  Phos  5.2     06-02  Mg     2.7     06-02    TPro  7.6  /  Alb  2.2<L>  /  TBili  0.6  /  DBili  x   /  AST  17  /  ALT  18  /  AlkPhos  139<H>  06-01    PT/INR - ( 03 Jun 2022 06:06 )   PT: 14.8 sec;   INR: 1.28 ratio               ABG - ( 03 Jun 2022 06:24 )  pH, Arterial: 7.50  pH, Blood: x     /  pCO2: 41    /  pO2: 62    / HCO3: 32    / Base Excess: 8.8   /  SaO2: 93.1                WBC:  WBC Count: 11.99 K/uL (06-03 @ 06:06)  WBC Count: 7.59 K/uL (06-02 @ 06:44)  WBC Count: 9.74 K/uL (06-01 @ 22:18)  WBC Count: 7.53 K/uL (06-01 @ 06:34)  WBC Count: 7.50 K/uL (05-31 @ 06:19)      MICROBIOLOGY:  RECENT CULTURES:  06-02 Trach Asp Tracheal Aspirate XXXX   Rare polymorphonuclear leukocytes per low power field  Rare Squamous epithelial cells per low power field  Moderate Gram Negative Rods per oil power field  Moderate Gram Negative Coccobacilli per oil power field XXXX                PT/INR - ( 03 Jun 2022 06:06 )   PT: 14.8 sec;   INR: 1.28 ratio             Sodium:  Sodium, Serum: 145 mmol/L (06-02 @ 06:44)  Sodium, Serum: 143 mmol/L (06-01 @ 22:18)  Sodium, Serum: 141 mmol/L (06-01 @ 06:34)  Sodium, Serum: 140 mmol/L (05-31 @ 06:19)      1.15 mg/dL 06-02 @ 06:44  1.13 mg/dL 06-01 @ 22:18  1.08 mg/dL 06-01 @ 06:34  1.07 mg/dL 05-31 @ 06:19      Hemoglobin:  Hemoglobin: 11.3 g/dL (06-03 @ 06:06)  Hemoglobin: 10.6 g/dL (06-02 @ 06:44)  Hemoglobin: 10.2 g/dL (06-01 @ 22:18)  Hemoglobin: 8.8 g/dL (06-01 @ 06:34)  Hemoglobin: 7.6 g/dL (05-31 @ 06:19)  Hemoglobin: 7.7 g/dL (05-30 @ 16:31)      Platelets: Platelet Count - Automated: 259 K/uL (06-03 @ 06:06)  Platelet Count - Automated: 220 K/uL (06-02 @ 06:44)  Platelet Count - Automated: 218 K/uL (06-01 @ 22:18)  Platelet Count - Automated: 197 K/uL (06-01 @ 06:34)  Platelet Count - Automated: 187 K/uL (05-31 @ 06:19)      LIVER FUNCTIONS - ( 01 Jun 2022 22:18 )  Alb: 2.2 g/dL / Pro: 7.6 g/dL / ALK PHOS: 139 U/L / ALT: 18 U/L DA / AST: 17 U/L / GGT: x                 RADIOLOGY & ADDITIONAL STUDIES:      MICROBIOLOGY:  RECENT CULTURES:  06-02 Trach Asp Tracheal Aspirate XXXX   Rare polymorphonuclear leukocytes per low power field  Rare Squamous epithelial cells per low power field  Moderate Gram Negative Rods per oil power field  Moderate Gram Negative Coccobacilli per oil power field XXXX

## 2022-06-04 LAB
ALBUMIN SERPL ELPH-MCNC: 1.9 G/DL — LOW (ref 3.3–5)
ALP SERPL-CCNC: 125 U/L — HIGH (ref 30–120)
ALT FLD-CCNC: 18 U/L DA — SIGNIFICANT CHANGE UP (ref 10–60)
ANION GAP SERPL CALC-SCNC: 11 MMOL/L — SIGNIFICANT CHANGE UP (ref 5–17)
AST SERPL-CCNC: 15 U/L — SIGNIFICANT CHANGE UP (ref 10–40)
BASOPHILS # BLD AUTO: 0.02 K/UL — SIGNIFICANT CHANGE UP (ref 0–0.2)
BASOPHILS NFR BLD AUTO: 0.3 % — SIGNIFICANT CHANGE UP (ref 0–2)
BILIRUB SERPL-MCNC: 0.8 MG/DL — SIGNIFICANT CHANGE UP (ref 0.2–1.2)
BUN SERPL-MCNC: 55 MG/DL — HIGH (ref 7–23)
CALCIUM SERPL-MCNC: 8.8 MG/DL — SIGNIFICANT CHANGE UP (ref 8.4–10.5)
CHLORIDE SERPL-SCNC: 108 MMOL/L — SIGNIFICANT CHANGE UP (ref 96–108)
CO2 SERPL-SCNC: 27 MMOL/L — SIGNIFICANT CHANGE UP (ref 22–31)
CREAT SERPL-MCNC: 1.46 MG/DL — HIGH (ref 0.5–1.3)
EGFR: 37 ML/MIN/1.73M2 — LOW
EOSINOPHIL # BLD AUTO: 0.23 K/UL — SIGNIFICANT CHANGE UP (ref 0–0.5)
EOSINOPHIL NFR BLD AUTO: 3.8 % — SIGNIFICANT CHANGE UP (ref 0–6)
GLUCOSE SERPL-MCNC: 210 MG/DL — HIGH (ref 70–99)
HCT VFR BLD CALC: 36.5 % — SIGNIFICANT CHANGE UP (ref 34.5–45)
HGB BLD-MCNC: 10.7 G/DL — LOW (ref 11.5–15.5)
IMM GRANULOCYTES NFR BLD AUTO: 0.3 % — SIGNIFICANT CHANGE UP (ref 0–1.5)
LYMPHOCYTES # BLD AUTO: 0.98 K/UL — LOW (ref 1–3.3)
LYMPHOCYTES # BLD AUTO: 16.3 % — SIGNIFICANT CHANGE UP (ref 13–44)
MCHC RBC-ENTMCNC: 26 PG — LOW (ref 27–34)
MCHC RBC-ENTMCNC: 29.3 GM/DL — LOW (ref 32–36)
MCV RBC AUTO: 88.6 FL — SIGNIFICANT CHANGE UP (ref 80–100)
MONOCYTES # BLD AUTO: 0.42 K/UL — SIGNIFICANT CHANGE UP (ref 0–0.9)
MONOCYTES NFR BLD AUTO: 7 % — SIGNIFICANT CHANGE UP (ref 2–14)
MRSA PCR RESULT.: SIGNIFICANT CHANGE UP
NEUTROPHILS # BLD AUTO: 4.33 K/UL — SIGNIFICANT CHANGE UP (ref 1.8–7.4)
NEUTROPHILS NFR BLD AUTO: 72.3 % — SIGNIFICANT CHANGE UP (ref 43–77)
NRBC # BLD: 0 /100 WBCS — SIGNIFICANT CHANGE UP (ref 0–0)
PLATELET # BLD AUTO: 232 K/UL — SIGNIFICANT CHANGE UP (ref 150–400)
POTASSIUM SERPL-MCNC: 4.7 MMOL/L — SIGNIFICANT CHANGE UP (ref 3.5–5.3)
POTASSIUM SERPL-SCNC: 4.7 MMOL/L — SIGNIFICANT CHANGE UP (ref 3.5–5.3)
PROCALCITONIN SERPL-MCNC: 0.55 NG/ML — HIGH (ref 0.02–0.1)
PROT SERPL-MCNC: 7.1 G/DL — SIGNIFICANT CHANGE UP (ref 6–8.3)
RBC # BLD: 4.12 M/UL — SIGNIFICANT CHANGE UP (ref 3.8–5.2)
RBC # FLD: 21 % — HIGH (ref 10.3–14.5)
S AUREUS DNA NOSE QL NAA+PROBE: SIGNIFICANT CHANGE UP
SODIUM SERPL-SCNC: 146 MMOL/L — HIGH (ref 135–145)
WBC # BLD: 6 K/UL — SIGNIFICANT CHANGE UP (ref 3.8–10.5)
WBC # FLD AUTO: 6 K/UL — SIGNIFICANT CHANGE UP (ref 3.8–10.5)

## 2022-06-04 PROCEDURE — 99233 SBSQ HOSP IP/OBS HIGH 50: CPT

## 2022-06-04 RX ADMIN — CHLORHEXIDINE GLUCONATE 15 MILLILITER(S): 213 SOLUTION TOPICAL at 17:44

## 2022-06-04 RX ADMIN — PIPERACILLIN AND TAZOBACTAM 25 GRAM(S): 4; .5 INJECTION, POWDER, LYOPHILIZED, FOR SOLUTION INTRAVENOUS at 01:59

## 2022-06-04 RX ADMIN — METHOCARBAMOL 500 MILLIGRAM(S): 500 TABLET, FILM COATED ORAL at 17:44

## 2022-06-04 RX ADMIN — Medication 1 APPLICATION(S): at 11:59

## 2022-06-04 RX ADMIN — PIPERACILLIN AND TAZOBACTAM 25 GRAM(S): 4; .5 INJECTION, POWDER, LYOPHILIZED, FOR SOLUTION INTRAVENOUS at 17:46

## 2022-06-04 RX ADMIN — HEPARIN SODIUM 5000 UNIT(S): 5000 INJECTION INTRAVENOUS; SUBCUTANEOUS at 21:21

## 2022-06-04 RX ADMIN — CHLORHEXIDINE GLUCONATE 15 MILLILITER(S): 213 SOLUTION TOPICAL at 05:31

## 2022-06-04 RX ADMIN — SIMETHICONE 80 MILLIGRAM(S): 80 TABLET, CHEWABLE ORAL at 05:30

## 2022-06-04 RX ADMIN — PIPERACILLIN AND TAZOBACTAM 25 GRAM(S): 4; .5 INJECTION, POWDER, LYOPHILIZED, FOR SOLUTION INTRAVENOUS at 09:26

## 2022-06-04 RX ADMIN — PANTOPRAZOLE SODIUM 40 MILLIGRAM(S): 20 TABLET, DELAYED RELEASE ORAL at 11:57

## 2022-06-04 RX ADMIN — HEPARIN SODIUM 5000 UNIT(S): 5000 INJECTION INTRAVENOUS; SUBCUTANEOUS at 05:31

## 2022-06-04 RX ADMIN — HEPARIN SODIUM 5000 UNIT(S): 5000 INJECTION INTRAVENOUS; SUBCUTANEOUS at 13:23

## 2022-06-04 RX ADMIN — Medication 2 MILLIGRAM(S): at 21:22

## 2022-06-04 RX ADMIN — Medication 2 MILLIGRAM(S): at 17:44

## 2022-06-04 RX ADMIN — NYSTATIN CREAM 1 APPLICATION(S): 100000 CREAM TOPICAL at 17:44

## 2022-06-04 RX ADMIN — Medication 1 MILLIGRAM(S): at 11:58

## 2022-06-04 RX ADMIN — Medication 1 TABLET(S): at 11:58

## 2022-06-04 RX ADMIN — Medication 2 MILLIGRAM(S): at 13:23

## 2022-06-04 RX ADMIN — METHOCARBAMOL 500 MILLIGRAM(S): 500 TABLET, FILM COATED ORAL at 05:31

## 2022-06-04 RX ADMIN — NYSTATIN CREAM 1 APPLICATION(S): 100000 CREAM TOPICAL at 05:31

## 2022-06-04 RX ADMIN — Medication 2: at 05:32

## 2022-06-04 RX ADMIN — SIMETHICONE 80 MILLIGRAM(S): 80 TABLET, CHEWABLE ORAL at 23:24

## 2022-06-04 RX ADMIN — Medication 2 MILLIGRAM(S): at 05:31

## 2022-06-04 RX ADMIN — SIMETHICONE 80 MILLIGRAM(S): 80 TABLET, CHEWABLE ORAL at 17:44

## 2022-06-04 RX ADMIN — Medication 2 MILLIGRAM(S): at 02:09

## 2022-06-04 RX ADMIN — Medication 2: at 23:24

## 2022-06-04 RX ADMIN — Medication 2: at 17:45

## 2022-06-04 RX ADMIN — SIMETHICONE 80 MILLIGRAM(S): 80 TABLET, CHEWABLE ORAL at 00:36

## 2022-06-04 RX ADMIN — SENNA PLUS 3 TABLET(S): 8.6 TABLET ORAL at 21:22

## 2022-06-04 RX ADMIN — Medication 2: at 12:00

## 2022-06-04 RX ADMIN — Medication 2 MILLIGRAM(S): at 09:25

## 2022-06-04 RX ADMIN — INSULIN GLARGINE 8 UNIT(S): 100 INJECTION, SOLUTION SUBCUTANEOUS at 08:04

## 2022-06-04 RX ADMIN — SIMETHICONE 80 MILLIGRAM(S): 80 TABLET, CHEWABLE ORAL at 11:57

## 2022-06-04 NOTE — PROGRESS NOTE ADULT - SUBJECTIVE AND OBJECTIVE BOX
Subjective: Patient seen and examined. No overnight events. PEG tube was leaking yesterday and replaced.     MEDICATIONS  (STANDING):  chlorhexidine 0.12% Liquid 15 milliLiter(s) Oral Mucosa every 12 hours  collagenase Ointment 1 Application(s) Topical daily  dextrose 5%. 1000 milliLiter(s) (50 mL/Hr) IV Continuous <Continuous>  dextrose 5%. 1000 milliLiter(s) (100 mL/Hr) IV Continuous <Continuous>  dextrose 50% Injectable 25 Gram(s) IV Push once  dextrose 50% Injectable 12.5 Gram(s) IV Push once  dextrose 50% Injectable 25 Gram(s) IV Push once  folic acid 1 milliGRAM(s) Oral daily  furosemide   Injectable 40 milliGRAM(s) IV Push daily  glucagon  Injectable 1 milliGRAM(s) IntraMuscular once  heparin   Injectable 5000 Unit(s) SubCutaneous every 8 hours  insulin glargine Injectable (LANTUS) 8 Unit(s) SubCutaneous every morning  insulin lispro (ADMELOG) corrective regimen sliding scale   SubCutaneous every 6 hours  lactobacillus acidophilus 1 Tablet(s) Oral daily  LORazepam     Tablet 2 milliGRAM(s) Oral every 4 hours  methocarbamol 500 milliGRAM(s) Oral two times a day  multivitamin 1 Tablet(s) Oral daily  nystatin Powder 1 Application(s) Topical every 12 hours  pantoprazole  Injectable 40 milliGRAM(s) IV Push daily  piperacillin/tazobactam IVPB.. 3.375 Gram(s) IV Intermittent every 8 hours  povidone iodine 10% Solution 1 Application(s) Topical daily  senna 3 Tablet(s) Oral at bedtime  simethicone 80 milliGRAM(s) Chew every 6 hours    MEDICATIONS  (PRN):  acetaminophen    Suspension .. 650 milliGRAM(s) Oral every 6 hours PRN Temp greater or equal to 38C (100.4F), Mild Pain (1 - 3)  albuterol/ipratropium for Nebulization 3 milliLiter(s) Nebulizer every 6 hours PRN Shortness of Breath and/or Wheezing  dextrose Oral Gel 15 Gram(s) Oral once PRN Blood Glucose LESS THAN 70 milliGRAM(s)/deciliter  LORazepam   Injectable 2 milliGRAM(s) IV Push every 4 hours PRN Agitation      Allergies    codeine (Hives)    Intolerances        Vital Signs Last 24 Hrs  T(C): 36.6 (2022 05:11), Max: 38.4 (2022 12:39)  T(F): 97.8 (2022 05:11), Max: 101.1 (2022 12:39)  HR: 72 (2022 10:00) (60 - 78)  BP: 108/51 (2022 10:00) (90/46 - 123/63)  BP(mean): 69 (2022 10:00) (60 - 81)  RR: 24 (2022 10:00) (21 - 24)  SpO2: 97% (2022 10:00) (94% - 99%)    PHYSICAL EXAM:  GENERAL: chronically ill appearing, emaciated, NAD, obtunded  HEAD:  atraumatic  EYES: conjunctiva clear  NECK: (+)trach in place, clean  RESPIRATORY: mechanical breath sounds, vent in place, no gross crackles or rales  CARDIOVASCULAR:  regular rate and rhythm, no murmurs or rubs or gallops, 2+ peripheral pulses  GASTROINTESTINAL:  soft, +PEG in place, clean and dry as well, nondistended, bowel sounds present  EXTREMITIES: no clubbing or cyanosis or edema  MUSCULOSKELETAL:  (+)diffuse contractures in all joints  NERVOUS SYSTEM: does not respond to lesia      LABS:                        10.7   6.00  )-----------( 232      ( 2022 06:28 )             36.5     2022 06:28    146    |  108    |  55     ----------------------------<  210    4.7     |  27     |  1.46     Ca    8.8        2022 06:28    TPro  7.1    /  Alb  1.9    /  TBili  0.8    /  DBili  x      /  AST  15     /  ALT  18     /  AlkPhos  125    2022 06:28    PT/INR - ( 2022 06:06 )   PT: 14.8 sec;   INR: 1.28 ratio           Urinalysis Basic - ( 2022 21:53 )    Color: Yellow / Appearance: Clear / S.005 / pH: x  Gluc: x / Ketone: Negative  / Bili: Negative / Urobili: Negative mg/dL   Blood: x / Protein: 30 mg/dL / Nitrite: Negative   Leuk Esterase: Small / RBC: 0-2 /HPF / WBC 6-10   Sq Epi: x / Non Sq Epi: Negative / Bacteria: Moderate      CAPILLARY BLOOD GLUCOSE      POCT Blood Glucose.: 161 mg/dL (2022 11:54)  POCT Blood Glucose.: 175 mg/dL (2022 05:26)  POCT Blood Glucose.: 231 mg/dL (2022 05:24)  POCT Blood Glucose.: 121 mg/dL (2022 00:39)  POCT Blood Glucose.: 172 mg/dL (2022 17:49)  POCT Blood Glucose.: 162 mg/dL (2022 12:37)      RADIOLOGY & ADDITIONAL TESTS:    Imaging Personally Reviewed:  [ ] YES     Consultant(s) Notes Reviewed:      Care Discussed with Consultants/Other Providers:    Advanced Directives: [ ] DNR  [ ] No feeding tube  [ ] MOLST in chart  [ ] MOLST completed today  [ ] Unknown

## 2022-06-04 NOTE — PROGRESS NOTE ADULT - SUBJECTIVE AND OBJECTIVE BOX
Date/Time Patient Seen:  		  Referring MD:   Data Reviewed	       Patient is a 76y old  Female who presents with a chief complaint of Respiratory distress. (03 Jun 2022 19:54)      Subjective/HPI     PAST MEDICAL & SURGICAL HISTORY:  Dementia of frontal lobe type    Aphasic stroke    Diabetes mellitus    Respiratory failure    Hypertension    GERD (gastroesophageal reflux disease)    Constipation    Respiratory failure    CVA (cerebral vascular accident)    HTN (hypertension)    DM (diabetes mellitus)    Advanced dementia    COVID-19 virus detected    Quadriplegia    Pneumonia    Type II diabetes mellitus    Hx of appendectomy    Gastrostomy in place    Tracheostomy in place    Tracheostomy tube present    Feeding by G-tube          Medication list         MEDICATIONS  (STANDING):  chlorhexidine 0.12% Liquid 15 milliLiter(s) Oral Mucosa every 12 hours  collagenase Ointment 1 Application(s) Topical daily  dextrose 5%. 1000 milliLiter(s) (50 mL/Hr) IV Continuous <Continuous>  dextrose 5%. 1000 milliLiter(s) (100 mL/Hr) IV Continuous <Continuous>  dextrose 50% Injectable 25 Gram(s) IV Push once  dextrose 50% Injectable 12.5 Gram(s) IV Push once  dextrose 50% Injectable 25 Gram(s) IV Push once  folic acid 1 milliGRAM(s) Oral daily  furosemide   Injectable 40 milliGRAM(s) IV Push daily  glucagon  Injectable 1 milliGRAM(s) IntraMuscular once  heparin   Injectable 5000 Unit(s) SubCutaneous every 8 hours  insulin glargine Injectable (LANTUS) 8 Unit(s) SubCutaneous every morning  insulin lispro (ADMELOG) corrective regimen sliding scale   SubCutaneous every 6 hours  lactobacillus acidophilus 1 Tablet(s) Oral daily  LORazepam     Tablet 2 milliGRAM(s) Oral every 4 hours  methocarbamol 500 milliGRAM(s) Oral two times a day  multivitamin 1 Tablet(s) Oral daily  nystatin Powder 1 Application(s) Topical every 12 hours  pantoprazole  Injectable 40 milliGRAM(s) IV Push daily  piperacillin/tazobactam IVPB.. 3.375 Gram(s) IV Intermittent every 8 hours  povidone iodine 10% Solution 1 Application(s) Topical daily  senna 3 Tablet(s) Oral at bedtime  simethicone 80 milliGRAM(s) Chew every 6 hours    MEDICATIONS  (PRN):  acetaminophen    Suspension .. 650 milliGRAM(s) Oral every 6 hours PRN Temp greater or equal to 38C (100.4F), Mild Pain (1 - 3)  albuterol/ipratropium for Nebulization 3 milliLiter(s) Nebulizer every 6 hours PRN Shortness of Breath and/or Wheezing  dextrose Oral Gel 15 Gram(s) Oral once PRN Blood Glucose LESS THAN 70 milliGRAM(s)/deciliter  LORazepam   Injectable 2 milliGRAM(s) IV Push every 4 hours PRN Agitation         Vitals log        ICU Vital Signs Last 24 Hrs  T(C): 36.6 (04 Jun 2022 05:11), Max: 38.4 (03 Jun 2022 12:39)  T(F): 97.8 (04 Jun 2022 05:11), Max: 101.1 (03 Jun 2022 12:39)  HR: 66 (04 Jun 2022 06:00) (60 - 87)  BP: 106/48 (04 Jun 2022 06:00) (90/46 - 123/63)  BP(mean): 66 (04 Jun 2022 06:00) (60 - 81)  ABP: --  ABP(mean): --  RR: 24 (04 Jun 2022 06:00) (21 - 24)  SpO2: 97% (04 Jun 2022 06:00) (94% - 99%)       Mode: AC/ CMV (Assist Control/ Continuous Mandatory Ventilation)  RR (machine): 24  TV (machine): 400  FiO2: 40  PEEP: 5  ITime: 0.9  MAP: 14  PIP: 27      Input and Output:  I&O's Detail    03 Jun 2022 07:01  -  04 Jun 2022 07:00  --------------------------------------------------------  IN:    Glucerna 1.5: 1020 mL    IV PiggyBack: 100 mL  Total IN: 1120 mL    OUT:    Incontinent per Collection Bag (mL): 100 mL  Total OUT: 100 mL    Total NET: 1020 mL          Lab Data                        10.7   6.00  )-----------( 232      ( 04 Jun 2022 06:28 )             36.5     06-04    146<H>  |  108  |  x   ----------------------------<  210<H>  4.7   |  27  |  1.46<H>    Ca    8.8      04 Jun 2022 06:28  Phos  3.9     06-03    TPro  7.1  /  Alb  1.9<L>  /  TBili  0.8  /  DBili  x   /  AST  15  /  ALT  18  /  AlkPhos  125<H>  06-04    ABG - ( 03 Jun 2022 06:24 )  pH, Arterial: 7.50  pH, Blood: x     /  pCO2: 41    /  pO2: 62    / HCO3: 32    / Base Excess: 8.8   /  SaO2: 93.1                    Review of Systems	      Objective     Physical Examination    heart s1s2  lung dec BS  abd soft      Pertinent Lab findings & Imaging      Annalise:  NO   Adequate UO     I&O's Detail    03 Jun 2022 07:01  -  04 Jun 2022 07:00  --------------------------------------------------------  IN:    Glucerna 1.5: 1020 mL    IV PiggyBack: 100 mL  Total IN: 1120 mL    OUT:    Incontinent per Collection Bag (mL): 100 mL  Total OUT: 100 mL    Total NET: 1020 mL               Discussed with:     Cultures:	        Radiology

## 2022-06-04 NOTE — PROGRESS NOTE ADULT - ASSESSMENT
peg leakage    plan  in and out  ppi once a day  gerd precautions  dressing changes to the peg site    Advanced care planning was discussed with patient and family.  Advanced care planning forms were reviewed and discussed.  Risks, benefits and alternatives of gastroenterologic procedures were discussed in detail and all questions were answered.    30 minutes spent.

## 2022-06-04 NOTE — PROGRESS NOTE ADULT - ASSESSMENT
78 y/o F with PMH of HTN, DM type 2, Cardiac Arrest, CVA, COPD, Chronic Respiratory Failure Vent Dependant s/p trach & PEG, Multiple Hospital Admissions for Aspiration & vent associated PNA, COVID-19 Infection, Anemia with GI blood loss, GERD, and Advanced Dementia presented with acute respiratory distress.    LASIX  ABX  GI eval noted  Pulm follow up  I and O  pleurocentesis - pleurx discussion    HCP Marciano -  - pt is full code  s/p emp ABX - broad spectrum for poss PNA  [facility] rx regimen  bronchodilators  oral and skin care  assist with needs - ADL  monitor VS and HD  CT imaging reviewed  Old records reviewed  suction PRN  trach care  peg care  nutritional optimization  decubitus prevention

## 2022-06-04 NOTE — PROGRESS NOTE ADULT - SUBJECTIVE AND OBJECTIVE BOX
BREA BECKHAM    [facility]    Allergies    codeine (Hives)    Intolerances        PAST MEDICAL & SURGICAL HISTORY:  Dementia of frontal lobe type      Aphasic stroke      Diabetes mellitus      Respiratory failure      Hypertension      GERD (gastroesophageal reflux disease)      Constipation      Respiratory failure      CVA (cerebral vascular accident)      HTN (hypertension)      DM (diabetes mellitus)      Advanced dementia      COVID-19 virus detected      Quadriplegia      Pneumonia      Type II diabetes mellitus      Hx of appendectomy      Gastrostomy in place      Tracheostomy in place      Tracheostomy tube present      Feeding by G-tube          FAMILY HISTORY:  No pertinent family history in first degree relatives        Home Medications:  albuterol 90 mcg/inh inhalation aerosol with adapter: 2  inhaled every 6 hours (21 May 2022 21:16)  Bacid (LAC) oral tablet: 2 tab(s) by gastrostomy tube once a day (21 May 2022 21:16)  Betadine 10% topical swab: cleanse right and left great toes (21 May 2022 21:16)  Carafate 1 g/10 mL oral suspension: 10 milliliter(s) by gastrostomy tube 4 times a day (before meals and at bedtime) for 14 days (Started 21) (21 May 2022 21:16)  chlorhexidine 0.12% mucous membrane liquid: 15 milliliter(s) mucous membrane 2 times a day (21 May 2022 21:16)  Eucerin topical cream: Apply topically to affected area once a day bilateral feet (21 May 2022 21:16)  folic acid 1 mg oral tablet: 1 tab(s) orally once a day (21 May 2022 21:16)  insulin glargine 100 units/mL subcutaneous solution: 8 unit(s) subcutaneous once a day (in the morning) (2022 08:24)  ipratropium-albuterol 0.5 mg-2.5 mg/3 mL inhalation solution: 3 milliliter(s) inhaled 4 times a day (21 May 2022 21:16)  LORazepam 1 mg oral tablet: 1 tab(s) by gastrostomy tube every 4 hours (21 May 2022 21:16)  methocarbamol 500 mg oral tablet: 1 tab(s) by gastrostomy tube 2 times a day (21 May 2022 21:16)  MiraLax oral powder for reconstitution:  (21 May 2022 23:14)  Multiple Vitamins oral tablet: 1 tab(s) orally once a day (21 May 2022 21:16)  nystatin 100,000 units/g topical powder: 1 application topically 3 times a day (29 May 2022 16:35)  omeprazole 20 mg oral delayed release capsule: orally 2 times a day (21 May 2022 23:14)  polyethylene glycol 3350 oral powder for reconstitution: 17 gram(s) by gastrostomy tube every 12 hours (21 May 2022 21:16)  senna 8.6 mg oral tablet: 3 tab(s) by gastrostomy tube once a day (at bedtime) (21 May 2022 21:16)  simethicone 80 mg oral tablet, chewable: 1 tab(s) by gastrostomy tube every 6 hours (21 May 2022 21:16)  Tylenol 325 mg oral tablet: 2 tab(s) by gastrostomy tube once a day; 60 minutes prior to dressing change  (21 May 2022 21:16)  Tylenol 325 mg oral tablet: 2 tab(s) by gastrostomy tube every 6 hours, As Needed (21 May 2022 21:16)      MEDICATIONS  (STANDING):  chlorhexidine 0.12% Liquid 15 milliLiter(s) Oral Mucosa every 12 hours  collagenase Ointment 1 Application(s) Topical daily  dextrose 5%. 1000 milliLiter(s) (50 mL/Hr) IV Continuous <Continuous>  dextrose 5%. 1000 milliLiter(s) (100 mL/Hr) IV Continuous <Continuous>  dextrose 50% Injectable 25 Gram(s) IV Push once  dextrose 50% Injectable 12.5 Gram(s) IV Push once  dextrose 50% Injectable 25 Gram(s) IV Push once  folic acid 1 milliGRAM(s) Oral daily  furosemide   Injectable 40 milliGRAM(s) IV Push daily  glucagon  Injectable 1 milliGRAM(s) IntraMuscular once  heparin   Injectable 5000 Unit(s) SubCutaneous every 8 hours  insulin glargine Injectable (LANTUS) 8 Unit(s) SubCutaneous every morning  insulin lispro (ADMELOG) corrective regimen sliding scale   SubCutaneous every 6 hours  lactobacillus acidophilus 1 Tablet(s) Oral daily  LORazepam     Tablet 2 milliGRAM(s) Oral every 4 hours  methocarbamol 500 milliGRAM(s) Oral two times a day  multivitamin 1 Tablet(s) Oral daily  nystatin Powder 1 Application(s) Topical every 12 hours  pantoprazole  Injectable 40 milliGRAM(s) IV Push daily  piperacillin/tazobactam IVPB.. 3.375 Gram(s) IV Intermittent every 8 hours  povidone iodine 10% Solution 1 Application(s) Topical daily  senna 3 Tablet(s) Oral at bedtime  simethicone 80 milliGRAM(s) Chew every 6 hours    MEDICATIONS  (PRN):  acetaminophen    Suspension .. 650 milliGRAM(s) Oral every 6 hours PRN Temp greater or equal to 38C (100.4F), Mild Pain (1 - 3)  albuterol/ipratropium for Nebulization 3 milliLiter(s) Nebulizer every 6 hours PRN Shortness of Breath and/or Wheezing  dextrose Oral Gel 15 Gram(s) Oral once PRN Blood Glucose LESS THAN 70 milliGRAM(s)/deciliter  LORazepam   Injectable 2 milliGRAM(s) IV Push every 4 hours PRN Agitation      Diet, NPO with Tube Feed:   Tube Feeding Modality: Gastrostomy  Glucerna 1.5 Horacio  Total Volume for 24 Hours (mL): 1200  Continuous  Until Goal Tube Feed Rate (mL per Hour): 60  Tube Feed Duration (in Hours): 20  Tube Feed Start Time: 00:00  Free Water Flush  Pump   Rate (mL per Hour): 25   Frequency: Every Hour    Duration (Hours): 24 (22 @ 11:46) [Pending Verification By Attending]  Diet, NPO with Tube Feed:   Tube Feeding Modality: Gastrostomy  Glucerna 1.5 Horacio  Total Volume for 24 Hours (mL): 1440  Continuous  Starting Tube Feed Rate mL per Hour: 60  Until Goal Tube Feed Rate (mL per Hour): 60  Tube Feed Duration (in Hours): 24  Tube Feed Start Time: 00:00 (22 @ 10:31) [Active]          Vital Signs Last 24 Hrs  T(C): 36.6 (2022 05:11), Max: 38.4 (2022 12:39)  T(F): 97.8 (2022 05:11), Max: 101.1 (2022 12:39)  HR: 72 (2022 10:00) (60 - 79)  BP: 108/51 (2022 10:00) (90/46 - 123/63)  BP(mean): 69 (2022 10:00) (60 - 81)  RR: 24 (2022 10:00) (21 - 24)  SpO2: 97% (2022 10:00) (94% - 99%)      22 @ 07:01  -  22 @ 07:00  --------------------------------------------------------  IN: 1120 mL / OUT: 100 mL / NET: 1020 mL        Mode: AC/ CMV (Assist Control/ Continuous Mandatory Ventilation), RR (machine): 24, TV (machine): 400, FiO2: 40, PEEP: 5, PS: 5, ITime: 0.9, MAP: 14, PIP: 28      LABS:                        10.7   6.00  )-----------( 232      ( 2022 06:28 )             36.5     06-04    146<H>  |  108  |  55<H>  ----------------------------<  210<H>  4.7   |  27  |  1.46<H>    Ca    8.8      2022 06:28  Phos  3.9     06-03    TPro  7.1  /  Alb  1.9<L>  /  TBili  0.8  /  DBili  x   /  AST  15  /  ALT  18  /  AlkPhos  125<H>  06-04    PT/INR - ( 2022 06:06 )   PT: 14.8 sec;   INR: 1.28 ratio           Urinalysis Basic - ( 2022 21:53 )    Color: Yellow / Appearance: Clear / S.005 / pH: x  Gluc: x / Ketone: Negative  / Bili: Negative / Urobili: Negative mg/dL   Blood: x / Protein: 30 mg/dL / Nitrite: Negative   Leuk Esterase: Small / RBC: 0-2 /HPF / WBC 6-10   Sq Epi: x / Non Sq Epi: Negative / Bacteria: Moderate        ABG - ( 2022 06:24 )  pH, Arterial: 7.50  pH, Blood: x     /  pCO2: 41    /  pO2: 62    / HCO3: 32    / Base Excess: 8.8   /  SaO2: 93.1                WBC:  WBC Count: 6.00 K/uL ( @ 06:28)  WBC Count: 11.99 K/uL ( @ 06:06)  WBC Count: 7.59 K/uL ( @ 06:44)  WBC Count: 9.74 K/uL ( @ 22:18)  WBC Count: 7.53 K/uL ( @ 06:34)      MICROBIOLOGY:  RECENT CULTURES:   Trach Asp Tracheal Aspirate XXXX   Rare polymorphonuclear leukocytes per low power field  Rare Squamous epithelial cells per low power field  Moderate Gram Negative Rods per oil power field  Moderate Gram Negative Coccobacilli per oil power field   Numerous Serratia marcescens                PT/INR - ( 2022 06:06 )   PT: 14.8 sec;   INR: 1.28 ratio             Sodium:  Sodium, Serum: 146 mmol/L ( @ 06:28)  Sodium, Serum: 144 mmol/L ( @ 06:06)  Sodium, Serum: 145 mmol/L ( @ 06:44)  Sodium, Serum: 143 mmol/L ( @ 22:18)  Sodium, Serum: 141 mmol/L ( @ 06:34)      1.46 mg/dL  @ 06:28  1.27 mg/dL  @ 06:06  1.15 mg/dL  @ 06:44  1.13 mg/dL  @ 22:18  1.08 mg/dL  @ 06:34      Hemoglobin:  Hemoglobin: 10.7 g/dL ( @ 06:28)  Hemoglobin: 11.3 g/dL ( @ 06:06)  Hemoglobin: 10.6 g/dL ( @ 06:44)  Hemoglobin: 10.2 g/dL ( @ 22:18)  Hemoglobin: 8.8 g/dL ( @ 06:34)      Platelets: Platelet Count - Automated: 232 K/uL ( @ 06:28)  Platelet Count - Automated: 259 K/uL ( @ 06:06)  Platelet Count - Automated: 220 K/uL ( @ 06:44)  Platelet Count - Automated: 218 K/uL ( @ 22:18)  Platelet Count - Automated: 197 K/uL ( @ 06:34)      LIVER FUNCTIONS - ( 2022 06:28 )  Alb: 1.9 g/dL / Pro: 7.1 g/dL / ALK PHOS: 125 U/L / ALT: 18 U/L DA / AST: 15 U/L / GGT: x             Urinalysis Basic - ( 2022 21:53 )    Color: Yellow / Appearance: Clear / S.005 / pH: x  Gluc: x / Ketone: Negative  / Bili: Negative / Urobili: Negative mg/dL   Blood: x / Protein: 30 mg/dL / Nitrite: Negative   Leuk Esterase: Small / RBC: 0-2 /HPF / WBC 6-10   Sq Epi: x / Non Sq Epi: Negative / Bacteria: Moderate        RADIOLOGY & ADDITIONAL STUDIES:      MICROBIOLOGY:  RECENT CULTURES:   Trach Asp Tracheal Aspirate XXXX   Rare polymorphonuclear leukocytes per low power field  Rare Squamous epithelial cells per low power field  Moderate Gram Negative Rods per oil power field  Moderate Gram Negative Coccobacilli per oil power field   Numerous Serratia marcescens

## 2022-06-04 NOTE — PROGRESS NOTE ADULT - SUBJECTIVE AND OBJECTIVE BOX
Patient is a 76y old  Female who presents with a chief complaint of Respiratory distress. (2022 12:40)    PAST MEDICAL & SURGICAL HISTORY:  Dementia of frontal lobe type      Aphasic stroke      Diabetes mellitus      Respiratory failure      Hypertension      GERD (gastroesophageal reflux disease)      Constipation      Respiratory failure      CVA (cerebral vascular accident)      HTN (hypertension)      DM (diabetes mellitus)      Advanced dementia      COVID-19 virus detected      Quadriplegia      Pneumonia      Type II diabetes mellitus      Hx of appendectomy      Gastrostomy in place      Tracheostomy in place      Tracheostomy tube present      Feeding by G-tube        BREA BECKHAM   76y    Female    BRIEF HOSPITAL COURSE:    Review of Systems:   UATO                    All other ROS are negative.    Allergies    codeine (Hives)    Intolerances          ICU Vital Signs Last 24 Hrs  T(C): 37.2 (2022 15:53), Max: 37.3 (2022 21:14)  T(F): 99 (2022 15:53), Max: 99.1 (2022 21:14)  HR: 77 (2022 20:00) (60 - 78)  BP: 116/53 (2022 20:00) (90/46 - 125/57)  BP(mean): 73 (2022 20:00) (60 - 81)  ABP: --  ABP(mean): --  RR: 24 (2022 20:00) (22 - 25)  SpO2: 100% (2022 20:00) (95% - 100%)      Physical Examination:    General: unresponsive     HEENT: trach     PULM: bilateral BS     CVS: s1 s2 reg    ABD:  peg soft     EXT: _ edema     SKIN: warm     Neuro: as above       ABG - ( 2022 06:24 )  pH, Arterial: 7.50  pH, Blood: x     /  pCO2: 41    /  pO2: 62    / HCO3: 32    / Base Excess: 8.8   /  SaO2: 93.1              Mode: AC/ CMV (Assist Control/ Continuous Mandatory Ventilation)  RR (machine): 24  TV (machine): 400  FiO2: 40  PEEP: 5  PS: 5  ITime: 0.9  MAP: 13  PIP: 27    Mode: AC/ CMV (Assist Control/ Continuous Mandatory Ventilation), RR (machine): 24, TV (machine): 400, FiO2: 40, PEEP: 5, PS: 5, ITime: 0.9, MAP: 13, PIP: 27  LABS:                        10.7   6.00  )-----------( 232      ( 2022 06:28 )             36.5     06-04    146<H>  |  108  |  55<H>  ----------------------------<  210<H>  4.7   |  27  |  1.46<H>    Ca    8.8      2022 06:28  Phos  3.9     06-03    TPro  7.1  /  Alb  1.9<L>  /  TBili  0.8  /  DBili  x   /  AST  15  /  ALT  18  /  AlkPhos  125<H>  06-04          CAPILLARY BLOOD GLUCOSE      POCT Blood Glucose.: 168 mg/dL (2022 17:42)  POCT Blood Glucose.: 161 mg/dL (2022 11:54)  POCT Blood Glucose.: 175 mg/dL (2022 05:26)  POCT Blood Glucose.: 231 mg/dL (2022 05:24)  POCT Blood Glucose.: 121 mg/dL (2022 00:39)    PT/INR - ( 2022 06:06 )   PT: 14.8 sec;   INR: 1.28 ratio           Urinalysis Basic - ( 2022 21:53 )    Color: Yellow / Appearance: Clear / S.005 / pH: x  Gluc: x / Ketone: Negative  / Bili: Negative / Urobili: Negative mg/dL   Blood: x / Protein: 30 mg/dL / Nitrite: Negative   Leuk Esterase: Small / RBC: 0-2 /HPF / WBC 6-10   Sq Epi: x / Non Sq Epi: Negative / Bacteria: Moderate      CULTURES:  Culture Results:   Numerous Serratia marcescens ( @ 14:57)  Rapid RVP Result: NotDetec ( @ 14:56)      Medications:  MEDICATIONS  (STANDING):  chlorhexidine 0.12% Liquid 15 milliLiter(s) Oral Mucosa every 12 hours  collagenase Ointment 1 Application(s) Topical daily  dextrose 5%. 1000 milliLiter(s) (50 mL/Hr) IV Continuous <Continuous>  dextrose 5%. 1000 milliLiter(s) (100 mL/Hr) IV Continuous <Continuous>  dextrose 50% Injectable 25 Gram(s) IV Push once  dextrose 50% Injectable 12.5 Gram(s) IV Push once  dextrose 50% Injectable 25 Gram(s) IV Push once  folic acid 1 milliGRAM(s) Oral daily  furosemide   Injectable 40 milliGRAM(s) IV Push daily  glucagon  Injectable 1 milliGRAM(s) IntraMuscular once  heparin   Injectable 5000 Unit(s) SubCutaneous every 8 hours  insulin glargine Injectable (LANTUS) 8 Unit(s) SubCutaneous every morning  insulin lispro (ADMELOG) corrective regimen sliding scale   SubCutaneous every 6 hours  lactobacillus acidophilus 1 Tablet(s) Oral daily  LORazepam     Tablet 2 milliGRAM(s) Oral every 4 hours  methocarbamol 500 milliGRAM(s) Oral two times a day  multivitamin 1 Tablet(s) Oral daily  nystatin Powder 1 Application(s) Topical every 12 hours  pantoprazole  Injectable 40 milliGRAM(s) IV Push daily  piperacillin/tazobactam IVPB.. 3.375 Gram(s) IV Intermittent every 8 hours  povidone iodine 10% Solution 1 Application(s) Topical daily  senna 3 Tablet(s) Oral at bedtime  simethicone 80 milliGRAM(s) Chew every 6 hours    MEDICATIONS  (PRN):  acetaminophen    Suspension .. 650 milliGRAM(s) Oral every 6 hours PRN Temp greater or equal to 38C (100.4F), Mild Pain (1 - 3)  albuterol/ipratropium for Nebulization 3 milliLiter(s) Nebulizer every 6 hours PRN Shortness of Breath and/or Wheezing  dextrose Oral Gel 15 Gram(s) Oral once PRN Blood Glucose LESS THAN 70 milliGRAM(s)/deciliter  LORazepam   Injectable 2 milliGRAM(s) IV Push every 4 hours PRN Agitation         @ : @ 07:00  --------------------------------------------------------  IN: 1120 mL / OUT: 100 mL / NET: 1020 mL     @ 07:01  -  04 @ 20:17  --------------------------------------------------------  IN: 840 mL / OUT: 0 mL / NET: 840 mL        RADIOLOGY/IMAGING/ECHO      < from: CT Chest No Cont (22 @ 23:29) >  IMPRESSION:  Pulmonary edema. Worsening of bilateral pleural effusions likely   reflecting progressive CHF.    Increase in bilateral patchy airspace opacities concerning for   superimposed multifocal pneumonia      < from: US Transthoracic Echocardiogram w/Doppler Complete (22 @ 10:54) >  IMPRESSION:  1. Normal left ventricular systolic function.  2. Dilated right ventricle.  3. Moderate pulmonary hypertension.      < end of copied text >      Assessment/Plan:    77 yo female with PMHx chronic respiratory failure s/p trach and peg, HTN, T2DM, CVA, GERD, and dementia    Recent admission, was here until  after rx for    VAP/PNA    Admit again  after 5 hrs  after with hypoxemia due to recurrent pleural effusions.       When the HOB is 0 degrees she desaturates.  This due to the sizeable pleural effusions which have recurrent due to hypooncotic state         Has developed pre-renal RYAN due to diuresis which has been stopped.      DVTP

## 2022-06-04 NOTE — PROGRESS NOTE ADULT - SUBJECTIVE AND OBJECTIVE BOX
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 Patient was discharged this afternoon from [facility] after 10 days admission for acute on chronic respiratory failure 2ry to [facility] with parapneumonic pleural effusion, had thoracentesis on May 25th, and completed a course of Avycaz 2 days prior to discharge, then monitored off antibiotics till discharge without spiking any fever. Patient is non communicating, no further history could be obtained at this time. (01 Jun 2022 23:29)      REVIEW OF SYSTEMS:    CONSTITUTIONAL: No fever, weight loss, or fatigue  EYES: No eye pain, visual disturbances, or discharge  ENMT:  No difficulty hearing, tinnitus, vertigo; No sinus or throat pain  NECK: No pain or stiffness  BREASTS: No pain, masses, or nipple discharge  RESPIRATORY: No cough, wheezing, chills or hemoptysis; No shortness of breath  CARDIOVASCULAR: No chest pain, palpitations, dizziness, or leg swelling  GASTROINTESTINAL: No abdominal or epigastric pain. No nausea, vomiting, or hematemesis; No diarrhea or constipation. No melena or hematochezia.  GENITOURINARY: No dysuria, frequency, hematuria, or incontinence  NEUROLOGICAL: No headaches, memory loss, loss of strength, numbness, or tremors  SKIN: No itching, burning, rashes, or lesions   LYMPH NODES: No enlarged glands  ENDOCRINE: No heat or cold intolerance; No hair loss  MUSCULOSKELETAL: No joint pain or swelling; No muscle, back, or extremity pain  PSYCHIATRIC: No depression, anxiety, mood swings, or difficulty sleeping  HEME/LYMPH: No easy bruising, or bleeding gums  ALLERGY AND IMMUNOLOGIC: No hives or eczema    MEDICATIONS  (STANDING):  chlorhexidine 0.12% Liquid 15 milliLiter(s) Oral Mucosa every 12 hours  collagenase Ointment 1 Application(s) Topical daily  dextrose 5%. 1000 milliLiter(s) (50 mL/Hr) IV Continuous <Continuous>  dextrose 5%. 1000 milliLiter(s) (100 mL/Hr) IV Continuous <Continuous>  dextrose 50% Injectable 25 Gram(s) IV Push once  dextrose 50% Injectable 12.5 Gram(s) IV Push once  dextrose 50% Injectable 25 Gram(s) IV Push once  folic acid 1 milliGRAM(s) Oral daily  furosemide   Injectable 40 milliGRAM(s) IV Push daily  glucagon  Injectable 1 milliGRAM(s) IntraMuscular once  heparin   Injectable 5000 Unit(s) SubCutaneous every 8 hours  insulin glargine Injectable (LANTUS) 8 Unit(s) SubCutaneous every morning  insulin lispro (ADMELOG) corrective regimen sliding scale   SubCutaneous every 6 hours  lactobacillus acidophilus 1 Tablet(s) Oral daily  LORazepam     Tablet 2 milliGRAM(s) Oral every 4 hours  methocarbamol 500 milliGRAM(s) Oral two times a day  multivitamin 1 Tablet(s) Oral daily  nystatin Powder 1 Application(s) Topical every 12 hours  pantoprazole  Injectable 40 milliGRAM(s) IV Push daily  piperacillin/tazobactam IVPB.. 3.375 Gram(s) IV Intermittent every 8 hours  povidone iodine 10% Solution 1 Application(s) Topical daily  senna 3 Tablet(s) Oral at bedtime  simethicone 80 milliGRAM(s) Chew every 6 hours    MEDICATIONS  (PRN):  acetaminophen    Suspension .. 650 milliGRAM(s) Oral every 6 hours PRN Temp greater or equal to 38C (100.4F), Mild Pain (1 - 3)  albuterol/ipratropium for Nebulization 3 milliLiter(s) Nebulizer every 6 hours PRN Shortness of Breath and/or Wheezing  dextrose Oral Gel 15 Gram(s) Oral once PRN Blood Glucose LESS THAN 70 milliGRAM(s)/deciliter  LORazepam   Injectable 2 milliGRAM(s) IV Push every 4 hours PRN Agitation      ALLERGIES: codeine (Hives)      FAMILY HISTORY:  No pertinent family history in first degree relatives        Social history:  Alochol:   Smoking:   Drug Use:   Marital Status:     PHYSICAL EXAMINATION:  -----------------------------  T(C): 36.6 (06-04-22 @ 05:11), Max: 38.4 (06-03-22 @ 12:39)  HR: 72 (06-04-22 @ 10:00) (60 - 78)  BP: 108/51 (06-04-22 @ 10:00) (90/46 - 123/63)  RR: 24 (06-04-22 @ 10:00) (21 - 24)  SpO2: 97% (06-04-22 @ 10:00) (94% - 99%)  Wt(kg): --    06-03 @ 07:01  -  06-04 @ 07:00  --------------------------------------------------------  IN:    Glucerna 1.5: 1020 mL    IV PiggyBack: 100 mL  Total IN: 1120 mL    OUT:    Incontinent per Collection Bag (mL): 100 mL  Total OUT: 100 mL    Total NET: 1020 mL            Constitutional: well developed, normal appearance, well groomed, well nourished, no deformities and no acute distress.   Eyes: the conjunctiva exhibited no abnormalities and the eyelids demonstrated no xanthelasmas.   HEENT: normal oral mucosa, no oral pallor and no oral cyanosis.   Neck: normal jugular venous A waves present, normal jugular venous V waves present and no jugular venous obregon A waves.   Pulmonary: no respiratory distress, normal respiratory rhythm and effort, no accessory muscle use and lungs were clear to auscultation bilaterally. Anteriorly  Cardiovascular: heart rate and rhythm were normal, normal S1 and S2 and no murmur, gallop, rub, heave or thrill are present.    Musculoskeletal: the gait could not be assessed.  Extremities: no clubbing of the fingernails, no localized cyanosis, no petechial hemorrhages and no ischemic changes.   Skin: normal skin color and pigmentation, no rash, no venous stasis, no skin lesions, no skin ulcer and no xanthoma was observed.     LABS:   --------  06-04    146<H>  |  108  |  55<H>  ----------------------------<  210<H>  4.7   |  27  |  1.46<H>    Ca    8.8      04 Jun 2022 06:28  Phos  3.9     06-03    TPro  7.1  /  Alb  1.9<L>  /  TBili  0.8  /  DBili  x   /  AST  15  /  ALT  18  /  AlkPhos  125<H>  06-04                         10.7   6.00  )-----------( 232      ( 04 Jun 2022 06:28 )             36.5     PT/INR - ( 03 Jun 2022 06:06 )   PT: 14.8 sec;   INR: 1.28 ratio                 Culture Results:   Numerous Serratia marcescens (06-02 @ 14:57)    06-02 @ 14:57    Organism --   Gram Stain Blood -- Gram Stain   Rare polymorphonuclear leukocytes per low power field  Rare Squamous epithelial cells per low power field  Moderate Gram Negative Rods per oil power field  Moderate Gram Negative Coccobacilli per oil power field  Specimen Source Trach Asp Tracheal Aspirate  Culture-Blood --        Radiology:    < from: US Transthoracic Echocardiogram w/Doppler Complete (05.30.22 @ 10:54) >    ACC: [id number] EXAM:  US TTE W DOPPLER COMPLETE                          PROCEDURE DATE:  05/30/2022          INTERPRETATION:  Ordering Physician: BISI BENNETT [id number]    Indication: Acute respiratory failure    Technician: LAZARUS    Study Quality: Technically difficult  A complete echocardiographic study was performed utilizing standard   protocol including spectral and color Doppler in all echocardiographic   windows.    Height: 165 cm  Weight: 54 kg  BSA: 1.58 m2  Blood Pressure: 122/60 mmHg    MEASUREMENTS  IVS: 0.9 cm  PWT: 0.7 cm  LA: 3.4 cm  AO: 2.5 cm  LVIDd: 4.1 cm  LVIDs: 2.8 cm    LVEF: 65%  RVSP: At least 60 mmHg  RA Pressure: N/A    FINDINGS  General: The patient was mechanically ventilated during the study period.  Left Ventricle:The left ventricle is normal in size, wall thickness, and   systolic function. No wall motion abnormalities. Estimated EF is 65%.  Right Ventricle: The right ventricle is dilated with normal function.  Left Atrium: The left atrium is normal in size.  Right Atrium: The right atrium is normal in size.  Mitral Valve: Mitral annular calcification. Calcified papillary muscles.   Mild mitral regurgitation.  Aortic Valve: Aortic valve sclerosis. No aortic regurgitation.  Tricuspid Valve: The tricuspid valve is structurally normal. Mild   tricuspid regurgitation. Estimated pulmonary artery systolic pressure is   at least 60 mmHg.  Pulmonic Valve: The pulmonic valve is not well visualized.  Diastolic Function: Impaired diastolic relaxation secondary to age.  Pericardium/Pleura: Small pericardial effusion visualized. Pleural   effusions present.  Aorta: The aortic root is normal in size.    IMPRESSION:  1. Normal left ventricular systolic function.  2. Dilated right ventricle.  3. Moderate pulmonary hypertension.    --- End of Report ---            CRYSTAL KERNS MD; Attending Cardiologist  This document has been electronically signed. May 31 2022  8:27AM    < end of copied text >

## 2022-06-04 NOTE — PROGRESS NOTE ADULT - SUBJECTIVE AND OBJECTIVE BOX
INTERVAL HPI/OVERNIGHT EVENTS:  No new overnight event.  No N/V/D.  Tolerating diet.  no leakage from peg    Allergies    codeine (Hives)    Intolerances    General:  No wt loss, fevers, chills, night sweats, fatigue,   Eyes:  Good vision, no reported pain  ENT:  No sore throat, pain, runny nose, dysphagia  CV:  No pain, palpitations, hypo/hypertension  Resp:  No dyspnea, cough, tachypnea, wheezing  GI:  No pain, No nausea, No vomiting, No diarrhea, No constipation, No weight loss, No fever, No pruritis, No rectal bleeding, No tarry stools, No dysphagia,  :  No pain, bleeding, incontinence, nocturia  Muscle:  No pain, weakness  Neuro:  No weakness, tingling, memory problems  Psych:  No fatigue, insomnia, mood problems, depression  Endocrine:  No polyuria, polydipsia, cold/heat intolerance  Heme:  No petechiae, ecchymosis, easy bruisability  Skin:  No rash, tattoos, scars, edema      PHYSICAL EXAM:   Vital Signs:  Vital Signs Last 24 Hrs  T(C): 36.6 (2022 05:11), Max: 38.3 (2022 15:38)  T(F): 97.8 (2022 05:11), Max: 101 (2022 15:38)  HR: 72 (2022 10:00) (60 - 78)  BP: 108/51 (2022 10:00) (90/46 - 123/63)  BP(mean): 69 (2022 10:00) (60 - 81)  RR: 24 (2022 10:00) (21 - 24)  SpO2: 97% (2022 10:00) (94% - 99%)  Daily     Daily Weight in k (2022 05:11)I&O's Summary    2022 07:01  -  2022 07:00  --------------------------------------------------------  IN: 1120 mL / OUT: 100 mL / NET: 1020 mL        GENERAL:  Appears stated age, well-groomed, well-nourished, no distress  HEENT:  NC/AT,  conjunctivae clear and pink, no thyromegaly, nodules, adenopathy, no JVD, sclera -anicteric  CHEST:  Full & symmetric excursion, no increased effort, breath sounds clear  HEART:  Regular rhythm, S1, S2, no murmur/rub/S3/S4, no abdominal bruit, no edema  ABDOMEN:  Soft, non-tender, non-distended, normoactive bowel sounds,  no masses ,no hepato-splenomegaly, no signs of chronic liver disease  EXTEREMITIES:  no cyanosis,clubbing or edema  SKIN:  No rash/erythema/ecchymoses/petechiae/wounds/abscess/warm/dry  NEURO:  Alert, oriented, no asterixis, no tremor, no encephalopathy      LABS:                        10.7   6.00  )-----------( 232      ( 2022 06:28 )             36.5     06-04    146<H>  |  108  |  55<H>  ----------------------------<  210<H>  4.7   |  27  |  1.46<H>    Ca    8.8      2022 06:28  Phos  3.9     06-03    TPro  7.1  /  Alb  1.9<L>  /  TBili  0.8  /  DBili  x   /  AST  15  /  ALT  18  /  AlkPhos  125<H>  06-04    PT/INR - ( 2022 06:06 )   PT: 14.8 sec;   INR: 1.28 ratio           Urinalysis Basic - ( 2022 21:53 )    Color: Yellow / Appearance: Clear / S.005 / pH: x  Gluc: x / Ketone: Negative  / Bili: Negative / Urobili: Negative mg/dL   Blood: x / Protein: 30 mg/dL / Nitrite: Negative   Leuk Esterase: Small / RBC: 0-2 /HPF / WBC 6-10   Sq Epi: x / Non Sq Epi: Negative / Bacteria: Moderate      amylase   lipase  RADIOLOGY & ADDITIONAL TESTS:

## 2022-06-04 NOTE — PROGRESS NOTE ADULT - ASSESSMENT
79F HTN, DM2, COPD, Chronic Respiratory Failure on Trach to Vent re-admitted for Acute Respiratory Distress    Acute Respiratory Distress  Patient discharged 6/1/22 and repeat CT showing increase pleural effusions; Continue IV Zosyn  Going for repeat thoracentesis; Distress could be exacerbated by anxiety component as well  Treated with Abx for PNA on prior admission and had Thoracentesis done 1500cc on Left SIde   TTE- Normal LV, dilated RV, mod pulm htn, small pericardial effusion  Continue Lasix Daily  Pulmonary and Cardiology Input appreciated    Anemia of Chronic Disease with Iron Deficiency  Has been evaluated by GI and Hematology on prior admissions  She has Anemia of Chronic Disease but could also have intermittent GI Bleeding; Occult Blood Negative  S/P 2U PRBC Transfusion and IV Venofer on prior admission  Multivitamin daily    Acute Renal Failure on CKD 3  Baseline Cr around 1.2  Will continue gentle hydration and maintain BP   Renally dose and monitor BMP and electrolytes     HTN  Hold all BP lower agents    DM2  FS and on ISS to maintain BS <180    Diet  Tube Feeds via PEG  PEG was leaking and replaced on 6/3/22    DVT Prophylaxis  Heparin    Disposition  Full Code

## 2022-06-04 NOTE — PROGRESS NOTE ADULT - ASSESSMENT
REVIEW OF SYMPTOMS      Able to give (reliable) ROS  NO     PHYSICAL EXAM    HEENT Unremarkable  atraumatic   RESP Fair air entry EXP prolonged    Harsh breath sound Resp distres mild   CARDIAC S1 S2 No S3     NO JVD    ABDOMEN SOFT BS PRESENT NOT DISTENDED No hepatosplenomegaly   PEDAL EDEMA present No calf tenderness  NO rash       AGE/SEX.   76 f  DOA.  6/2/2022  CC .  6/2/2022 Resp distress at [facility]  PMH .  pmh Hosp stay 5/21-6/1/2022 ceftazidime avibactam 1.25x2 5/22- 5/30/2022    pmh Hosp stay given zosyn 4/21  pmh Pleural effsn   5/25/2022 1.5 l clear pl effsn removed left side IR  5/25/2022 g 183 l 144/381 .37 p 3.4/5.3 .64 p 23 l 36   5/25/2022l pl fluid  lymph pred exudate   cyto (-)     pmh TRANSFUSION.  5/23/2022 1 u prbc  pmh Pneumonia    pmh HTN,   pmh DM,   pmh CVA,   pmh  chronic respiratory failure   pmh s/p trach, PEG,   pmh cardiac arrest      MAIN ISSUES .  CHF HFPEF AOC poa 6/1   VDRF pmh   FEVER 6/3/2022    COVID/ICU/CODE STATUS.                       COVID  STATUS.    6/2/2022 scv2 (-)        ICU STAY. 6/2/2022  GOC.  6/2/2022 full code     BEST PRACTICE ISSUES.                                                  HEAD OF BED ELEVATION. Yes  DVT PROPHYLAXIS.   6/1 hpsc    SQUIRES PROPHYLAXIS. 6/1 protonix 40                                                                                        DIET.  6/2/2022 glucerna 1.5 1440     VITALS/PO/IO/VENT/DRIPS.    6/4/2022 afeb 68 100/50   6/4/2022 ac 24/400/5/.4      PROBLEM DATA/ASSESSMENT RECOMMENDATIONS (A/R).    HEMODYNAMICS.   Monitor and optimize bp   Target MAP to miguel  65 (+)    RESP.   Monitor and optimize  po   Target po to remain 90-95%    ABG.   6/3/2022  6a 24/400/5/.4 750/41/62     PMH/PSH PROBLEMS.  Management continued/modified as indicated    VENT MANAGEMENT.   HOB elevation  Target Pplat 30 (-)  Target PO 90-95%  Target pH 730 (+)  Daily spontaneous breathing trials   Daily sedation vacation       INFECTION SOURCE DD.   INFECTION A/R.   No obvious new infection  Shje is status post recent course of abio ceftazidime avibactam 1.25x2 5/22- 5/30/2022    defer abio  ID eval     INFECTION AUGUST.  W 6/2-6/3/2022 w 7.5 - 11.9   IAMGING.   Cxr 6/1/2022 diffuse advanced bl infiltrates with moderate basal effsns increased from 5/31   ct chest 6/1/2022 Pulm edema Worsening bl effsns likely reflecting progressive chf Increased bl patchy airspace opac concerning for superimposed mf pneum   MICROBIO.  rvp 6/2 (-)   Trach 6/2 1) Mod gnr 2) Mod gn coccobacilli   ABIO.  6/3/2022 ID on case abio deferred     COPD.  6/1/2022 duoneb.4  CHF.  echo 5/30/2022 ef 65% pasp 60 dialted rv   6/2/2022 lasix 40  ANEMIA.  Hb 6/2-6/3/2022 Hb 10.6 - 11.3   RENAL.  Na 6/2-6/3/2022 Na 145 - 144  Cr 6/2-6/3-6/4/2022 Cr 1.1- 1.2 - 1.4   DM.   6/1 riss   6/1 lantus 8  ANXIETY.  6/2/2022 lorazepam 2.6   DECUB.   6/1 collagenase r gluteal wound       TIME SPENT   Over 39 minutes aggregate critical care time spent on encounter; activities included   direct patient care, counseling and/or coordinating care reviewing notes, lab data/ imaging , discussion with multidisciplinary team/ patient  /family and explaining in detail risks, benefits, alternatives  of the recommendations     CHAPINCITO Cooley f

## 2022-06-04 NOTE — PROGRESS NOTE ADULT - ASSESSMENT
The patient is a 76 year old female with a history of HTN, DM, CVA, dementia, chronic respiratory failure s/p trach, PEG, cardiac arrest, anemia who presents with respiratory distress.    6/4/22  Seen at [facility] ICU  Essentially unresponsive  Eye open, gazing to the right    Plan:  - Echo 5/30/22 with normal LV systolic function, mod pulm HTN-see above  - Cardiac enzymes negative  - CT chest with moderate bilateral pleural effusions  - Pleural effusions last admission consistent with exudate  - Vent settings weaned down to normal overnight  - Lower furosemide to via PEG or discontinue  - Plan for repeat thoracentesis  - Being followed by pulmonary, palliative care, ID, GI

## 2022-06-05 LAB
-  AMIKACIN: SIGNIFICANT CHANGE UP
-  AMIKACIN: SIGNIFICANT CHANGE UP
-  AMOXICILLIN/CLAVULANIC ACID: SIGNIFICANT CHANGE UP
-  AMPICILLIN/SULBACTAM: SIGNIFICANT CHANGE UP
-  AMPICILLIN: SIGNIFICANT CHANGE UP
-  AZTREONAM: SIGNIFICANT CHANGE UP
-  AZTREONAM: SIGNIFICANT CHANGE UP
-  CEFAZOLIN: SIGNIFICANT CHANGE UP
-  CEFEPIME: SIGNIFICANT CHANGE UP
-  CEFEPIME: SIGNIFICANT CHANGE UP
-  CEFOXITIN: SIGNIFICANT CHANGE UP
-  CEFTAZIDIME: SIGNIFICANT CHANGE UP
-  CEFTRIAXONE: SIGNIFICANT CHANGE UP
-  CIPROFLOXACIN: SIGNIFICANT CHANGE UP
-  CIPROFLOXACIN: SIGNIFICANT CHANGE UP
-  ERTAPENEM: SIGNIFICANT CHANGE UP
-  GENTAMICIN: SIGNIFICANT CHANGE UP
-  GENTAMICIN: SIGNIFICANT CHANGE UP
-  IMIPENEM: SIGNIFICANT CHANGE UP
-  LEVOFLOXACIN: SIGNIFICANT CHANGE UP
-  LEVOFLOXACIN: SIGNIFICANT CHANGE UP
-  MEROPENEM: SIGNIFICANT CHANGE UP
-  MEROPENEM: SIGNIFICANT CHANGE UP
-  PIPERACILLIN/TAZOBACTAM: SIGNIFICANT CHANGE UP
-  PIPERACILLIN/TAZOBACTAM: SIGNIFICANT CHANGE UP
-  TOBRAMYCIN: SIGNIFICANT CHANGE UP
-  TOBRAMYCIN: SIGNIFICANT CHANGE UP
-  TRIMETHOPRIM/SULFAMETHOXAZOLE: SIGNIFICANT CHANGE UP
ALBUMIN SERPL ELPH-MCNC: 2.1 G/DL — LOW (ref 3.3–5)
ALP SERPL-CCNC: 127 U/L — HIGH (ref 30–120)
ALT FLD-CCNC: 13 U/L DA — SIGNIFICANT CHANGE UP (ref 10–60)
ANION GAP SERPL CALC-SCNC: 6 MMOL/L — SIGNIFICANT CHANGE UP (ref 5–17)
AST SERPL-CCNC: 16 U/L — SIGNIFICANT CHANGE UP (ref 10–40)
BASOPHILS # BLD AUTO: 0.02 K/UL — SIGNIFICANT CHANGE UP (ref 0–0.2)
BASOPHILS NFR BLD AUTO: 0.4 % — SIGNIFICANT CHANGE UP (ref 0–2)
BILIRUB SERPL-MCNC: 0.5 MG/DL — SIGNIFICANT CHANGE UP (ref 0.2–1.2)
BUN SERPL-MCNC: 47 MG/DL — HIGH (ref 7–23)
CALCIUM SERPL-MCNC: 9.2 MG/DL — SIGNIFICANT CHANGE UP (ref 8.4–10.5)
CHLORIDE SERPL-SCNC: 108 MMOL/L — SIGNIFICANT CHANGE UP (ref 96–108)
CO2 SERPL-SCNC: 29 MMOL/L — SIGNIFICANT CHANGE UP (ref 22–31)
CREAT SERPL-MCNC: 1.22 MG/DL — SIGNIFICANT CHANGE UP (ref 0.5–1.3)
CULTURE RESULTS: SIGNIFICANT CHANGE UP
CULTURE RESULTS: SIGNIFICANT CHANGE UP
EGFR: 45 ML/MIN/1.73M2 — LOW
EOSINOPHIL # BLD AUTO: 0.18 K/UL — SIGNIFICANT CHANGE UP (ref 0–0.5)
EOSINOPHIL NFR BLD AUTO: 3.3 % — SIGNIFICANT CHANGE UP (ref 0–6)
GLUCOSE SERPL-MCNC: 202 MG/DL — HIGH (ref 70–99)
HCT VFR BLD CALC: 39.7 % — SIGNIFICANT CHANGE UP (ref 34.5–45)
HGB BLD-MCNC: 11.3 G/DL — LOW (ref 11.5–15.5)
IMM GRANULOCYTES NFR BLD AUTO: 0.6 % — SIGNIFICANT CHANGE UP (ref 0–1.5)
LYMPHOCYTES # BLD AUTO: 0.84 K/UL — LOW (ref 1–3.3)
LYMPHOCYTES # BLD AUTO: 15.4 % — SIGNIFICANT CHANGE UP (ref 13–44)
MCHC RBC-ENTMCNC: 25.7 PG — LOW (ref 27–34)
MCHC RBC-ENTMCNC: 28.5 GM/DL — LOW (ref 32–36)
MCV RBC AUTO: 90.4 FL — SIGNIFICANT CHANGE UP (ref 80–100)
METHOD TYPE: SIGNIFICANT CHANGE UP
METHOD TYPE: SIGNIFICANT CHANGE UP
MONOCYTES # BLD AUTO: 0.45 K/UL — SIGNIFICANT CHANGE UP (ref 0–0.9)
MONOCYTES NFR BLD AUTO: 8.3 % — SIGNIFICANT CHANGE UP (ref 2–14)
NEUTROPHILS # BLD AUTO: 3.92 K/UL — SIGNIFICANT CHANGE UP (ref 1.8–7.4)
NEUTROPHILS NFR BLD AUTO: 72 % — SIGNIFICANT CHANGE UP (ref 43–77)
NRBC # BLD: 0 /100 WBCS — SIGNIFICANT CHANGE UP (ref 0–0)
ORGANISM # SPEC MICROSCOPIC CNT: SIGNIFICANT CHANGE UP
PLATELET # BLD AUTO: 165 K/UL — SIGNIFICANT CHANGE UP (ref 150–400)
POTASSIUM SERPL-MCNC: 5.3 MMOL/L — SIGNIFICANT CHANGE UP (ref 3.5–5.3)
POTASSIUM SERPL-SCNC: 5.3 MMOL/L — SIGNIFICANT CHANGE UP (ref 3.5–5.3)
PROT SERPL-MCNC: 7.7 G/DL — SIGNIFICANT CHANGE UP (ref 6–8.3)
RBC # BLD: 4.39 M/UL — SIGNIFICANT CHANGE UP (ref 3.8–5.2)
RBC # FLD: 20.2 % — HIGH (ref 10.3–14.5)
SODIUM SERPL-SCNC: 143 MMOL/L — SIGNIFICANT CHANGE UP (ref 135–145)
SPECIMEN SOURCE: SIGNIFICANT CHANGE UP
SPECIMEN SOURCE: SIGNIFICANT CHANGE UP
WBC # BLD: 5.44 K/UL — SIGNIFICANT CHANGE UP (ref 3.8–10.5)
WBC # FLD AUTO: 5.44 K/UL — SIGNIFICANT CHANGE UP (ref 3.8–10.5)

## 2022-06-05 PROCEDURE — 99233 SBSQ HOSP IP/OBS HIGH 50: CPT

## 2022-06-05 RX ADMIN — Medication 2 MILLIGRAM(S): at 13:29

## 2022-06-05 RX ADMIN — HEPARIN SODIUM 5000 UNIT(S): 5000 INJECTION INTRAVENOUS; SUBCUTANEOUS at 13:29

## 2022-06-05 RX ADMIN — NYSTATIN CREAM 1 APPLICATION(S): 100000 CREAM TOPICAL at 17:42

## 2022-06-05 RX ADMIN — PIPERACILLIN AND TAZOBACTAM 25 GRAM(S): 4; .5 INJECTION, POWDER, LYOPHILIZED, FOR SOLUTION INTRAVENOUS at 17:42

## 2022-06-05 RX ADMIN — SIMETHICONE 80 MILLIGRAM(S): 80 TABLET, CHEWABLE ORAL at 05:44

## 2022-06-05 RX ADMIN — METHOCARBAMOL 500 MILLIGRAM(S): 500 TABLET, FILM COATED ORAL at 05:46

## 2022-06-05 RX ADMIN — Medication 1 TABLET(S): at 12:27

## 2022-06-05 RX ADMIN — SIMETHICONE 80 MILLIGRAM(S): 80 TABLET, CHEWABLE ORAL at 17:39

## 2022-06-05 RX ADMIN — CHLORHEXIDINE GLUCONATE 15 MILLILITER(S): 213 SOLUTION TOPICAL at 05:45

## 2022-06-05 RX ADMIN — Medication 2 MILLIGRAM(S): at 05:44

## 2022-06-05 RX ADMIN — Medication 1 APPLICATION(S): at 12:28

## 2022-06-05 RX ADMIN — SIMETHICONE 80 MILLIGRAM(S): 80 TABLET, CHEWABLE ORAL at 23:24

## 2022-06-05 RX ADMIN — Medication 2 MILLIGRAM(S): at 09:57

## 2022-06-05 RX ADMIN — HEPARIN SODIUM 5000 UNIT(S): 5000 INJECTION INTRAVENOUS; SUBCUTANEOUS at 21:37

## 2022-06-05 RX ADMIN — PIPERACILLIN AND TAZOBACTAM 25 GRAM(S): 4; .5 INJECTION, POWDER, LYOPHILIZED, FOR SOLUTION INTRAVENOUS at 08:06

## 2022-06-05 RX ADMIN — Medication 2 MILLIGRAM(S): at 20:23

## 2022-06-05 RX ADMIN — INSULIN GLARGINE 8 UNIT(S): 100 INJECTION, SOLUTION SUBCUTANEOUS at 08:06

## 2022-06-05 RX ADMIN — Medication 2: at 18:07

## 2022-06-05 RX ADMIN — Medication 2: at 12:48

## 2022-06-05 RX ADMIN — SIMETHICONE 80 MILLIGRAM(S): 80 TABLET, CHEWABLE ORAL at 12:27

## 2022-06-05 RX ADMIN — CHLORHEXIDINE GLUCONATE 15 MILLILITER(S): 213 SOLUTION TOPICAL at 17:38

## 2022-06-05 RX ADMIN — SENNA PLUS 3 TABLET(S): 8.6 TABLET ORAL at 21:38

## 2022-06-05 RX ADMIN — PANTOPRAZOLE SODIUM 40 MILLIGRAM(S): 20 TABLET, DELAYED RELEASE ORAL at 12:24

## 2022-06-05 RX ADMIN — Medication 1 MILLIGRAM(S): at 12:27

## 2022-06-05 RX ADMIN — Medication 2 MILLIGRAM(S): at 01:39

## 2022-06-05 RX ADMIN — Medication 1 APPLICATION(S): at 12:49

## 2022-06-05 RX ADMIN — Medication 1 TABLET(S): at 12:28

## 2022-06-05 RX ADMIN — NYSTATIN CREAM 1 APPLICATION(S): 100000 CREAM TOPICAL at 05:45

## 2022-06-05 RX ADMIN — METHOCARBAMOL 500 MILLIGRAM(S): 500 TABLET, FILM COATED ORAL at 17:41

## 2022-06-05 RX ADMIN — Medication 2 MILLIGRAM(S): at 17:41

## 2022-06-05 RX ADMIN — Medication 2 MILLIGRAM(S): at 21:37

## 2022-06-05 RX ADMIN — Medication 2: at 05:43

## 2022-06-05 RX ADMIN — HEPARIN SODIUM 5000 UNIT(S): 5000 INJECTION INTRAVENOUS; SUBCUTANEOUS at 05:46

## 2022-06-05 RX ADMIN — PIPERACILLIN AND TAZOBACTAM 25 GRAM(S): 4; .5 INJECTION, POWDER, LYOPHILIZED, FOR SOLUTION INTRAVENOUS at 01:39

## 2022-06-05 NOTE — PROGRESS NOTE ADULT - SUBJECTIVE AND OBJECTIVE BOX
Date/Time Patient Seen:  		  Referring MD:   Data Reviewed	       Patient is a 76y old  Female who presents with a chief complaint of Respiratory distress. (04 Jun 2022 20:17)      Subjective/HPI     PAST MEDICAL & SURGICAL HISTORY:  Dementia of frontal lobe type    Aphasic stroke    Diabetes mellitus    Respiratory failure    Hypertension    GERD (gastroesophageal reflux disease)    Constipation    Respiratory failure    CVA (cerebral vascular accident)    HTN (hypertension)    DM (diabetes mellitus)    Advanced dementia    COVID-19 virus detected    Quadriplegia    Pneumonia    Type II diabetes mellitus    Hx of appendectomy    Gastrostomy in place    Tracheostomy in place    Tracheostomy tube present    Feeding by G-tube          Medication list         MEDICATIONS  (STANDING):  chlorhexidine 0.12% Liquid 15 milliLiter(s) Oral Mucosa every 12 hours  collagenase Ointment 1 Application(s) Topical daily  dextrose 5%. 1000 milliLiter(s) (50 mL/Hr) IV Continuous <Continuous>  dextrose 5%. 1000 milliLiter(s) (100 mL/Hr) IV Continuous <Continuous>  dextrose 50% Injectable 25 Gram(s) IV Push once  dextrose 50% Injectable 12.5 Gram(s) IV Push once  dextrose 50% Injectable 25 Gram(s) IV Push once  folic acid 1 milliGRAM(s) Oral daily  glucagon  Injectable 1 milliGRAM(s) IntraMuscular once  heparin   Injectable 5000 Unit(s) SubCutaneous every 8 hours  insulin glargine Injectable (LANTUS) 8 Unit(s) SubCutaneous every morning  insulin lispro (ADMELOG) corrective regimen sliding scale   SubCutaneous every 6 hours  lactobacillus acidophilus 1 Tablet(s) Oral daily  LORazepam     Tablet 2 milliGRAM(s) Oral every 4 hours  methocarbamol 500 milliGRAM(s) Oral two times a day  multivitamin 1 Tablet(s) Oral daily  nystatin Powder 1 Application(s) Topical every 12 hours  pantoprazole  Injectable 40 milliGRAM(s) IV Push daily  piperacillin/tazobactam IVPB.. 3.375 Gram(s) IV Intermittent every 8 hours  povidone iodine 10% Solution 1 Application(s) Topical daily  senna 3 Tablet(s) Oral at bedtime  simethicone 80 milliGRAM(s) Chew every 6 hours    MEDICATIONS  (PRN):  acetaminophen    Suspension .. 650 milliGRAM(s) Oral every 6 hours PRN Temp greater or equal to 38C (100.4F), Mild Pain (1 - 3)  albuterol/ipratropium for Nebulization 3 milliLiter(s) Nebulizer every 6 hours PRN Shortness of Breath and/or Wheezing  dextrose Oral Gel 15 Gram(s) Oral once PRN Blood Glucose LESS THAN 70 milliGRAM(s)/deciliter  LORazepam   Injectable 2 milliGRAM(s) IV Push every 4 hours PRN Agitation         Vitals log        ICU Vital Signs Last 24 Hrs  T(C): 36.9 (05 Jun 2022 03:58), Max: 37.2 (04 Jun 2022 15:53)  T(F): 98.5 (05 Jun 2022 03:58), Max: 99 (04 Jun 2022 15:53)  HR: 69 (05 Jun 2022 06:00) (59 - 88)  BP: 111/51 (05 Jun 2022 06:00) (104/49 - 143/67)  BP(mean): 71 (05 Jun 2022 06:00) (67 - 89)  ABP: --  ABP(mean): --  RR: 24 (05 Jun 2022 06:00) (22 - 29)  SpO2: 100% (05 Jun 2022 06:00) (97% - 100%)       Mode: AC/ CMV (Assist Control/ Continuous Mandatory Ventilation)  RR (machine): 24  TV (machine): 400  FiO2: 40  PEEP: 5  PS: 5  ITime: 0.9  MAP: 12  PIP: 24      Input and Output:  I&O's Detail    04 Jun 2022 07:01  -  05 Jun 2022 07:00  --------------------------------------------------------  IN:    Glucerna 1.5: 1320 mL    IV PiggyBack: 100 mL  Total IN: 1420 mL    OUT:    Incontinent per Collection Bag (mL): 700 mL  Total OUT: 700 mL    Total NET: 720 mL          Lab Data                        11.3   5.44  )-----------( 165      ( 05 Jun 2022 06:37 )             39.7     06-04    146<H>  |  108  |  55<H>  ----------------------------<  210<H>  4.7   |  27  |  1.46<H>    Ca    8.8      04 Jun 2022 06:28    TPro  7.1  /  Alb  1.9<L>  /  TBili  0.8  /  DBili  x   /  AST  15  /  ALT  18  /  AlkPhos  125<H>  06-04            Review of Systems	      Objective     Physical Examination    heart s1s2  lung dec BS  abd soft      Pertinent Lab findings & Imaging      Annalise:  NO   Adequate UO     I&O's Detail    04 Jun 2022 07:01  -  05 Jun 2022 07:00  --------------------------------------------------------  IN:    Glucerna 1.5: 1320 mL    IV PiggyBack: 100 mL  Total IN: 1420 mL    OUT:    Incontinent per Collection Bag (mL): 700 mL  Total OUT: 700 mL    Total NET: 720 mL               Discussed with:     Cultures:	        Radiology

## 2022-06-05 NOTE — PROGRESS NOTE ADULT - SUBJECTIVE AND OBJECTIVE BOX
Subjective: No overnight events.    MEDICATIONS  (STANDING):  chlorhexidine 0.12% Liquid 15 milliLiter(s) Oral Mucosa every 12 hours  collagenase Ointment 1 Application(s) Topical daily  dextrose 5%. 1000 milliLiter(s) (50 mL/Hr) IV Continuous <Continuous>  dextrose 5%. 1000 milliLiter(s) (100 mL/Hr) IV Continuous <Continuous>  dextrose 50% Injectable 25 Gram(s) IV Push once  dextrose 50% Injectable 12.5 Gram(s) IV Push once  dextrose 50% Injectable 25 Gram(s) IV Push once  folic acid 1 milliGRAM(s) Oral daily  glucagon  Injectable 1 milliGRAM(s) IntraMuscular once  heparin   Injectable 5000 Unit(s) SubCutaneous every 8 hours  insulin glargine Injectable (LANTUS) 8 Unit(s) SubCutaneous every morning  insulin lispro (ADMELOG) corrective regimen sliding scale   SubCutaneous every 6 hours  lactobacillus acidophilus 1 Tablet(s) Oral daily  LORazepam     Tablet 2 milliGRAM(s) Oral every 4 hours  methocarbamol 500 milliGRAM(s) Oral two times a day  multivitamin 1 Tablet(s) Oral daily  nystatin Powder 1 Application(s) Topical every 12 hours  pantoprazole  Injectable 40 milliGRAM(s) IV Push daily  piperacillin/tazobactam IVPB.. 3.375 Gram(s) IV Intermittent every 8 hours  povidone iodine 10% Solution 1 Application(s) Topical daily  senna 3 Tablet(s) Oral at bedtime  simethicone 80 milliGRAM(s) Chew every 6 hours    MEDICATIONS  (PRN):  acetaminophen    Suspension .. 650 milliGRAM(s) Oral every 6 hours PRN Temp greater or equal to 38C (100.4F), Mild Pain (1 - 3)  albuterol/ipratropium for Nebulization 3 milliLiter(s) Nebulizer every 6 hours PRN Shortness of Breath and/or Wheezing  dextrose Oral Gel 15 Gram(s) Oral once PRN Blood Glucose LESS THAN 70 milliGRAM(s)/deciliter  LORazepam   Injectable 2 milliGRAM(s) IV Push every 4 hours PRN Agitation      Allergies    codeine (Hives)    Intolerances        Vital Signs Last 24 Hrs  T(C): 36.9 (2022 03:58), Max: 37.2 (2022 15:53)  T(F): 98.5 (2022 03:58), Max: 99 (2022 15:53)  HR: 68 (2022 07:47) (59 - 88)  BP: 111/51 (2022 06:00) (106/53 - 143/67)  BP(mean): 71 (2022 06:00) (69 - 89)  RR: 24 (2022 06:00) (22 - 29)  SpO2: 100% (2022 07:47) (97% - 100%)    PHYSICAL EXAM:  GENERAL: chronically ill appearing, emaciated, NAD, obtunded  HEAD:  atraumatic  EYES: conjunctiva clear  NECK: (+)trach in place, clean  RESPIRATORY: mechanical breath sounds, vent in place, no gross crackles or rales  CARDIOVASCULAR:  regular rate and rhythm, no murmurs or rubs or gallops, 2+ peripheral pulses  GASTROINTESTINAL:  soft, +PEG in place, clean and dry as well, nondistended, bowel sounds present  EXTREMITIES: no clubbing or cyanosis or edema  MUSCULOSKELETAL:  (+)diffuse contractures in all joints  NERVOUS SYSTEM: does not respond to lesia    LABS:                        11.3   5.44  )-----------( 165      ( 2022 06:37 )             39.7     2022 06:37    143    |  108    |  47     ----------------------------<  202    5.3     |  29     |  1.22     Ca    9.2        2022 06:37    TPro  7.7    /  Alb  2.1    /  TBili  0.5    /  DBili  x      /  AST  16     /  ALT  13     /  AlkPhos  127    2022 06:37      Urinalysis Basic - ( 2022 21:53 )    Color: Yellow / Appearance: Clear / S.005 / pH: x  Gluc: x / Ketone: Negative  / Bili: Negative / Urobili: Negative mg/dL   Blood: x / Protein: 30 mg/dL / Nitrite: Negative   Leuk Esterase: Small / RBC: 0-2 /HPF / WBC 6-10   Sq Epi: x / Non Sq Epi: Negative / Bacteria: Moderate      CAPILLARY BLOOD GLUCOSE      POCT Blood Glucose.: 192 mg/dL (2022 05:40)  POCT Blood Glucose.: 168 mg/dL (2022 23:15)  POCT Blood Glucose.: 168 mg/dL (2022 17:42)  POCT Blood Glucose.: 161 mg/dL (2022 11:54)      RADIOLOGY & ADDITIONAL TESTS:    Imaging Personally Reviewed:  [ ] YES     Consultant(s) Notes Reviewed:      Care Discussed with Consultants/Other Providers:    Advanced Directives: [ ] DNR  [ ] No feeding tube  [ ] MOLST in chart  [ ] MOLST completed today  [ ] Unknown

## 2022-06-05 NOTE — PROGRESS NOTE ADULT - SUBJECTIVE AND OBJECTIVE BOX
Patient is a 76y old  Female who presents with a chief complaint of Respiratory distress. (2022 13:56)      BRIEF HOSPITAL COURSE: 77 yo female with PMHx chronic respiratory failure s/p trach and peg, HTN, T2DM, CVA, GERD, and dementia    Recent admission, was here until  after rx for    VAP/PNA    Admit again  after 5 hrs  after with hypoxemia due to recurrent pleural effusions.     Events last 24 hours: afebrile, episodes of tachypnea with low TV, unclear etiology, short lived,  has been doing this for longest time, resolves with time and +/- Ativan IV. Thick white secretions on suctioning.     PAST MEDICAL & SURGICAL HISTORY:  Dementia of frontal lobe type      Aphasic stroke      Diabetes mellitus      Respiratory failure      Hypertension      GERD (gastroesophageal reflux disease)      Constipation      Respiratory failure      CVA (cerebral vascular accident)      HTN (hypertension)      DM (diabetes mellitus)      Advanced dementia      COVID-19 virus detected      Quadriplegia      Pneumonia      Type II diabetes mellitus      Hx of appendectomy      Gastrostomy in place      Tracheostomy in place      Tracheostomy tube present      Feeding by G-tube          Review of Systems:  unable to obtain       Medications:  piperacillin/tazobactam IVPB.. 3.375 Gram(s) IV Intermittent every 8 hours      albuterol/ipratropium for Nebulization 3 milliLiter(s) Nebulizer every 6 hours PRN    acetaminophen    Suspension .. 650 milliGRAM(s) Oral every 6 hours PRN  LORazepam     Tablet 2 milliGRAM(s) Oral every 4 hours  LORazepam   Injectable 2 milliGRAM(s) IV Push every 4 hours PRN  methocarbamol 500 milliGRAM(s) Oral two times a day      heparin   Injectable 5000 Unit(s) SubCutaneous every 8 hours    pantoprazole  Injectable 40 milliGRAM(s) IV Push daily  senna 3 Tablet(s) Oral at bedtime  simethicone 80 milliGRAM(s) Chew every 6 hours      dextrose 50% Injectable 25 Gram(s) IV Push once  dextrose 50% Injectable 12.5 Gram(s) IV Push once  dextrose 50% Injectable 25 Gram(s) IV Push once  dextrose Oral Gel 15 Gram(s) Oral once PRN  glucagon  Injectable 1 milliGRAM(s) IntraMuscular once  insulin glargine Injectable (LANTUS) 8 Unit(s) SubCutaneous every morning  insulin lispro (ADMELOG) corrective regimen sliding scale   SubCutaneous every 6 hours    dextrose 5%. 1000 milliLiter(s) IV Continuous <Continuous>  dextrose 5%. 1000 milliLiter(s) IV Continuous <Continuous>  folic acid 1 milliGRAM(s) Oral daily  multivitamin 1 Tablet(s) Oral daily      chlorhexidine 0.12% Liquid 15 milliLiter(s) Oral Mucosa every 12 hours  collagenase Ointment 1 Application(s) Topical daily  nystatin Powder 1 Application(s) Topical every 12 hours  povidone iodine 10% Solution 1 Application(s) Topical daily    lactobacillus acidophilus 1 Tablet(s) Oral daily      Mode: AC/ CMV (Assist Control/ Continuous Mandatory Ventilation)  RR (machine): 24  TV (machine): 400  FiO2: 40  PEEP: 5  PS: 5  ITime: 0.9  MAP: 14  PIP: 26      ICU Vital Signs Last 24 Hrs  T(C): 37.1 (2022 20:00), Max: 37.2 (2022 14:00)  T(F): 98.8 (2022 20:00), Max: 98.9 (2022 14:00)  HR: 69 (2022 20:00) (59 - 88)  BP: 127/56 (2022 20:00) (106/53 - 143/67)  BP(mean): 77 (2022 20:00) (69 - 89)  ABP: --  ABP(mean): --  RR: 26 (2022 20:00) (21 - 32)  SpO2: 100% (2022 20:00) (99% - 100%)          I&O's Detail    2022 07:01  -  2022 07:00  --------------------------------------------------------  IN:    Glucerna 1.5: 1320 mL    IV PiggyBack: 100 mL  Total IN: 1420 mL    OUT:    Incontinent per Collection Bag (mL): 700 mL  Total OUT: 700 mL    Total NET: 720 mL      2022 07:01  -  2022 20:43  --------------------------------------------------------  IN:    Free Water: 225 mL    Glucerna 1.5: 780 mL    IV PiggyBack: 200 mL  Total IN: 1205 mL    OUT:    Voided (mL): 800 mL  Total OUT: 800 mL    Total NET: 405 mL            LABS:                        11.3   5.44  )-----------( 165      ( 2022 06:37 )             39.7         143  |  108  |  47<H>  ----------------------------<  202<H>  5.3   |  29  |  1.22    Ca    9.2      2022 06:37    TPro  7.7  /  Alb  2.1<L>  /  TBili  0.5  /  DBili  x   /  AST  16  /  ALT  13  /  AlkPhos  127<H>            CAPILLARY BLOOD GLUCOSE      POCT Blood Glucose.: 159 mg/dL (2022 18:06)      Urinalysis Basic - ( 2022 21:53 )    Color: Yellow / Appearance: Clear / S.005 / pH: x  Gluc: x / Ketone: Negative  / Bili: Negative / Urobili: Negative mg/dL   Blood: x / Protein: 30 mg/dL / Nitrite: Negative   Leuk Esterase: Small / RBC: 0-2 /HPF / WBC 6-10   Sq Epi: x / Non Sq Epi: Negative / Bacteria: Moderate      CULTURES:  Culture Results:   <10,000 CFU/mL Normal Urogenital Elvira (22 @ 21:53)  Culture Results:   No growth to date. (22 @ 18:12)  Culture Results:   No growth to date. (22 @ 18:12)  Culture Results:   Numerous Serratia marcescens  Moderate Pseudomonas aeruginosa (Carbapenem Resistant)  Normal Respiratory Elvira present (22 @ 14:57)  Rapid RVP Result: NotDetec (22 @ 14:56)      Physical Examination:    General: ill appearing female trach to vent    HEENT: Pupils equal, reactive to light.  Symmetric.    PULM: thick white sputum on suctioning, rhonchi     CVS: Regular rate and rhythm, no murmurs, rubs, or gallops    ABD: Soft, distended, nontender, normoactive bowel sounds    EXT: edema    SKIN: Warm     .

## 2022-06-05 NOTE — PROGRESS NOTE ADULT - ASSESSMENT
75 yo female with PMHx chronic respiratory failure s/p trach and peg, HTN, T2DM, CVA, GERD, and dementia    Recent admission, was here until 6/1 after rx for    VAP/PNA    Admit again 6/1 after 5 hrs  after with hypoxemia due to recurrent pleural effusions.     Tachypnea  PNA?  Pleural effusions    On zosyn right now as spiked a temp of 101 on 6/3, sputum with CRE pseudomonas and Serratia MDR, just completed course of avycaz less than 1 week ago. Spoke to ID Dr. [place] no need to change abx at this time, will see from ID in AM  Patient on 40% Fio2, peep 5, no shock evidence   drainage of effusion  episode of tachypnea with low TV on vent, history of this, resolves with time and +/- Ativan, most likely neurologic   Home meds  Tube feeds  DVT-P

## 2022-06-05 NOTE — PROGRESS NOTE ADULT - SUBJECTIVE AND OBJECTIVE BOX
[facility] BECKH[facility]    Allergies    codeine (Hives)    Intolerances        PAST MEDICAL & SURGICAL HISTORY:  Dementia of frontal lobe type      Aphasic stroke      Diabetes mellitus      Respiratory failure      Hypertension      GERD (gastroesophageal reflux disease)      Constipation      Respiratory failure      CVA (cerebral vascular accident)      HTN (hypertension)      DM (diabetes mellitus)      Advanced dementia      COVID-19 virus detected      Quadriplegia      Pneumonia      Type II diabetes mellitus      Hx of appendectomy      Gastrostomy in place      Tracheostomy in place      Tracheostomy tube present      Feeding by G-tube          FAMILY HISTORY:  No pertinent family history in first degree relatives        Home Medications:  albuterol 90 mcg/inh inhalation aerosol with adapter: 2  inhaled every 6 hours (21 May 2022 21:16)  Bacid (LAC) oral tablet: 2 tab(s) by gastrostomy tube once a day (21 May 2022 21:16)  Betadine 10% topical swab: cleanse right and left great toes (21 May 2022 21:16)  Carafate 1 g/10 mL oral suspension: 10 milliliter(s) by gastrostomy tube 4 times a day (before meals and at bedtime) for 14 days (Started 21) (21 May 2022 21:16)  chlorhexidine 0.12% mucous membrane liquid: 15 milliliter(s) mucous membrane 2 times a day (21 May 2022 21:16)  Eucerin topical cream: Apply topically to affected area once a day bilateral feet (21 May 2022 21:16)  folic acid 1 mg oral tablet: 1 tab(s) orally once a day (21 May 2022 21:16)  insulin glargine 100 units/mL subcutaneous solution: 8 unit(s) subcutaneous once a day (in the morning) (2022 08:24)  ipratropium-albuterol 0.5 mg-2.5 mg/3 mL inhalation solution: 3 milliliter(s) inhaled 4 times a day (21 May 2022 21:16)  LORazepam 1 mg oral tablet: 1 tab(s) by gastrostomy tube every 4 hours (21 May 2022 21:16)  methocarbamol 500 mg oral tablet: 1 tab(s) by gastrostomy tube 2 times a day (21 May 2022 21:16)  MiraLax oral powder for reconstitution:  (21 May 2022 23:14)  Multiple Vitamins oral tablet: 1 tab(s) orally once a day (21 May 2022 21:16)  nystatin 100,000 units/g topical powder: 1 application topically 3 times a day (29 May 2022 16:35)  omeprazole 20 mg oral delayed release capsule: orally 2 times a day (21 May 2022 23:14)  polyethylene glycol 3350 oral powder for reconstitution: 17 gram(s) by gastrostomy tube every 12 hours (21 May 2022 21:16)  senna 8.6 mg oral tablet: 3 tab(s) by gastrostomy tube once a day (at bedtime) (21 May 2022 21:16)  simethicone 80 mg oral tablet, chewable: 1 tab(s) by gastrostomy tube every 6 hours (21 May 2022 21:16)  Tylenol 325 mg oral tablet: 2 tab(s) by gastrostomy tube once a day; 60 minutes prior to dressing change  (21 May 2022 21:16)  Tylenol 325 mg oral tablet: 2 tab(s) by gastrostomy tube every 6 hours, As Needed (21 May 2022 21:16)      MEDICATIONS  (STANDING):  chlorhexidine 0.12% Liquid 15 milliLiter(s) Oral Mucosa every 12 hours  collagenase Ointment 1 Application(s) Topical daily  dextrose 5%. 1000 milliLiter(s) (50 mL/Hr) IV Continuous <Continuous>  dextrose 5%. 1000 milliLiter(s) (100 mL/Hr) IV Continuous <Continuous>  dextrose 50% Injectable 25 Gram(s) IV Push once  dextrose 50% Injectable 12.5 Gram(s) IV Push once  dextrose 50% Injectable 25 Gram(s) IV Push once  folic acid 1 milliGRAM(s) Oral daily  glucagon  Injectable 1 milliGRAM(s) IntraMuscular once  heparin   Injectable 5000 Unit(s) SubCutaneous every 8 hours  insulin glargine Injectable (LANTUS) 8 Unit(s) SubCutaneous every morning  insulin lispro (ADMELOG) corrective regimen sliding scale   SubCutaneous every 6 hours  lactobacillus acidophilus 1 Tablet(s) Oral daily  LORazepam     Tablet 2 milliGRAM(s) Oral every 4 hours  methocarbamol 500 milliGRAM(s) Oral two times a day  multivitamin 1 Tablet(s) Oral daily  nystatin Powder 1 Application(s) Topical every 12 hours  pantoprazole  Injectable 40 milliGRAM(s) IV Push daily  piperacillin/tazobactam IVPB.. 3.375 Gram(s) IV Intermittent every 8 hours  povidone iodine 10% Solution 1 Application(s) Topical daily  senna 3 Tablet(s) Oral at bedtime  simethicone 80 milliGRAM(s) Chew every 6 hours    MEDICATIONS  (PRN):  acetaminophen    Suspension .. 650 milliGRAM(s) Oral every 6 hours PRN Temp greater or equal to 38C (100.4F), Mild Pain (1 - 3)  albuterol/ipratropium for Nebulization 3 milliLiter(s) Nebulizer every 6 hours PRN Shortness of Breath and/or Wheezing  dextrose Oral Gel 15 Gram(s) Oral once PRN Blood Glucose LESS THAN 70 milliGRAM(s)/deciliter  LORazepam   Injectable 2 milliGRAM(s) IV Push every 4 hours PRN Agitation      Diet, NPO with Tube Feed:   Tube Feeding Modality: Gastrostomy  Glucerna 1.5 Horacio  Total Volume for 24 Hours (mL): 1200  Continuous  Until Goal Tube Feed Rate (mL per Hour): 60  Tube Feed Duration (in Hours): 20  Tube Feed Start Time: 00:00  Free Water Flush  Pump   Rate (mL per Hour): 25   Frequency: Every Hour    Duration (Hours): 24 (22 @ 11:46) [Pending Verification By Attending]  Diet, NPO with Tube Feed:   Tube Feeding Modality: Gastrostomy  Glucerna 1.5 Horacio  Total Volume for 24 Hours (mL): 1440  Continuous  Starting Tube Feed Rate mL per Hour: 60  Until Goal Tube Feed Rate (mL per Hour): 60  Tube Feed Duration (in Hours): 24  Tube Feed Start Time: 00:00 (22 @ 10:31) [Active]          Vital Signs Last 24 Hrs  T(C): 36.9 (2022 08:33), Max: 37.2 (2022 15:53)  T(F): 98.4 (2022 08:33), Max: 99 (2022 15:53)  HR: 63 (2022 08:00) (59 - 88)  BP: 117/52 (2022 08:00) (106/53 - 143/67)  BP(mean): 73 (2022 08:00) (70 - 89)  RR: 24 (2022 08:00) (22 - 29)  SpO2: 100% (2022 08:00) (98% - 100%)      22 @ 07:01  -  22 @ 07:00  --------------------------------------------------------  IN: 1420 mL / OUT: 700 mL / NET: 720 mL        Mode: AC/ CMV (Assist Control/ Continuous Mandatory Ventilation), RR (machine): 24, TV (machine): 400, FiO2: 40, PEEP: 5, PS: 5, ITime: 0.9, MAP: 12, PIP: 25      LABS:                        11.3   5.44  )-----------( 165      ( 2022 06:37 )             39.7         143  |  108  |  47<H>  ----------------------------<  202<H>  5.3   |  29  |  1.22    Ca    9.2      2022 06:37    TPro  7.7  /  Alb  2.1<L>  /  TBili  0.5  /  DBili  x   /  AST  16  /  ALT  13  /  AlkPhos  127<H>  06-      Urinalysis Basic - ( 2022 21:53 )    Color: Yellow / Appearance: Clear / S.005 / pH: x  Gluc: x / Ketone: Negative  / Bili: Negative / Urobili: Negative mg/dL   Blood: x / Protein: 30 mg/dL / Nitrite: Negative   Leuk Esterase: Small / RBC: 0-2 /HPF / WBC 6-10   Sq Epi: x / Non Sq Epi: Negative / Bacteria: Moderate            WBC:  WBC Count: 5.44 K/uL ( @ 06:37)  WBC Count: 6.00 K/uL ( @ 06:28)  WBC Count: 11.99 K/uL ( @ 06:06)  WBC Count: 7.59 K/uL ( @ 06:44)  WBC Count: 9.74 K/uL ( @ 22:18)      MICROBIOLOGY:  RECENT CULTURES:   .Blood Blood-Peripheral XXXX XXXX   No growth to date.     Trach Asp Tracheal Aspirate XXXX   Rare polymorphonuclear leukocytes per low power field  Rare Squamous epithelial cells per low power field  Moderate Gram Negative Rods per oil power field  Moderate Gram Negative Coccobacilli per oil power field   Numerous Serratia marcescens  Moderate Pseudomonas aeruginosa                    Sodium:  Sodium, Serum: 143 mmol/L ( @ 06:37)  Sodium, Serum: 146 mmol/L ( @ 06:28)  Sodium, Serum: 144 mmol/L ( @ 06:06)  Sodium, Serum: 145 mmol/L ( @ 06:44)  Sodium, Serum: 143 mmol/L ( @ 22:18)      1.22 mg/dL  @ :37  1.46 mg/dL  @ 06:28  1.27 mg/dL  @ 06:06  1.15 mg/dL  @ 06:44  1.13 mg/dL  @ 22:18      Hemoglobin:  Hemoglobin: 11.3 g/dL ( @ 06:37)  Hemoglobin: 10.7 g/dL ( @ 06:28)  Hemoglobin: 11.3 g/dL ( @ 06:06)  Hemoglobin: 10.6 g/dL ( @ 06:44)  Hemoglobin: 10.2 g/dL ( @ 22:18)      Platelets: Platelet Count - Automated: 165 K/uL ( @ 06:37)  Platelet Count - Automated: 232 K/uL ( @ 06:28)  Platelet Count - Automated: 259 K/uL ( @ 06:06)  Platelet Count - Automated: 220 K/uL ( @ 06:44)  Platelet Count - Automated: 218 K/uL ( @ 22:18)      LIVER FUNCTIONS - ( 2022 06:37 )  Alb: 2.1 g/dL / Pro: 7.7 g/dL / ALK PHOS: 127 U/L / ALT: 13 U/L DA / AST: 16 U/L / GGT: x             Urinalysis Basic - ( 2022 21:53 )    Color: Yellow / Appearance: Clear / S.005 / pH: x  Gluc: x / Ketone: Negative  / Bili: Negative / Urobili: Negative mg/dL   Blood: x / Protein: 30 mg/dL / Nitrite: Negative   Leuk Esterase: Small / RBC: 0-2 /HPF / WBC 6-10   Sq Epi: x / Non Sq Epi: Negative / Bacteria: Moderate        RADIOLOGY & ADDITIONAL STUDIES:      MICROBIOLOGY:  RECENT CULTURES:   .Blood Blood-Peripheral XXXX XXXX   No growth to date.     Trach Asp Tracheal Aspirate XXXX   Rare polymorphonuclear leukocytes per low power field  Rare Squamous epithelial cells per low power field  Moderate Gram Negative Rods per oil power field  Moderate Gram Negative Coccobacilli per oil power field   Numerous Serratia marcescens  Moderate Pseudomonas aeruginosa

## 2022-06-05 NOTE — PROGRESS NOTE ADULT - SUBJECTIVE AND OBJECTIVE BOX
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 Patient was discharged this afternoon from [facility] after 10 days admission for acute on chronic respiratory failure 2ry to [facility] with parapneumonic pleural effusion, had thoracentesis on May 25th, and completed a course of Avycaz 2 days prior to discharge, then monitored off antibiotics till discharge without spiking any fever. Patient is non communicating, no further history could be obtained at this time. (01 Jun 2022 23:29)      REVIEW OF SYSTEMS:    CONSTITUTIONAL: No fever, weight loss, or fatigue  EYES: No eye pain, visual disturbances, or discharge  ENMT:  No difficulty hearing, tinnitus, vertigo; No sinus or throat pain  NECK: No pain or stiffness  BREASTS: No pain, masses, or nipple discharge  RESPIRATORY: No cough, wheezing, chills or hemoptysis; No shortness of breath  CARDIOVASCULAR: No chest pain, palpitations, dizziness, or leg swelling  GASTROINTESTINAL: No abdominal or epigastric pain. No nausea, vomiting, or hematemesis; No diarrhea or constipation. No melena or hematochezia.  GENITOURINARY: No dysuria, frequency, hematuria, or incontinence  NEUROLOGICAL: No headaches, memory loss, loss of strength, numbness, or tremors  SKIN: No itching, burning, rashes, or lesions   LYMPH NODES: No enlarged glands  ENDOCRINE: No heat or cold intolerance; No hair loss  MUSCULOSKELETAL: No joint pain or swelling; No muscle, back, or extremity pain  PSYCHIATRIC: No depression, anxiety, mood swings, or difficulty sleeping  HEME/LYMPH: No easy bruising, or bleeding gums  ALLERGY AND IMMUNOLOGIC: No hives or eczema    MEDICATIONS  (STANDING):  chlorhexidine 0.12% Liquid 15 milliLiter(s) Oral Mucosa every 12 hours  collagenase Ointment 1 Application(s) Topical daily  dextrose 5%. 1000 milliLiter(s) (50 mL/Hr) IV Continuous <Continuous>  dextrose 5%. 1000 milliLiter(s) (100 mL/Hr) IV Continuous <Continuous>  dextrose 50% Injectable 25 Gram(s) IV Push once  dextrose 50% Injectable 12.5 Gram(s) IV Push once  dextrose 50% Injectable 25 Gram(s) IV Push once  folic acid 1 milliGRAM(s) Oral daily  glucagon  Injectable 1 milliGRAM(s) IntraMuscular once  heparin   Injectable 5000 Unit(s) SubCutaneous every 8 hours  insulin glargine Injectable (LANTUS) 8 Unit(s) SubCutaneous every morning  insulin lispro (ADMELOG) corrective regimen sliding scale   SubCutaneous every 6 hours  lactobacillus acidophilus 1 Tablet(s) Oral daily  LORazepam     Tablet 2 milliGRAM(s) Oral every 4 hours  methocarbamol 500 milliGRAM(s) Oral two times a day  multivitamin 1 Tablet(s) Oral daily  nystatin Powder 1 Application(s) Topical every 12 hours  pantoprazole  Injectable 40 milliGRAM(s) IV Push daily  piperacillin/tazobactam IVPB.. 3.375 Gram(s) IV Intermittent every 8 hours  povidone iodine 10% Solution 1 Application(s) Topical daily  senna 3 Tablet(s) Oral at bedtime  simethicone 80 milliGRAM(s) Chew every 6 hours    MEDICATIONS  (PRN):  acetaminophen    Suspension .. 650 milliGRAM(s) Oral every 6 hours PRN Temp greater or equal to 38C (100.4F), Mild Pain (1 - 3)  albuterol/ipratropium for Nebulization 3 milliLiter(s) Nebulizer every 6 hours PRN Shortness of Breath and/or Wheezing  dextrose Oral Gel 15 Gram(s) Oral once PRN Blood Glucose LESS THAN 70 milliGRAM(s)/deciliter  LORazepam   Injectable 2 milliGRAM(s) IV Push every 4 hours PRN Agitation      ALLERGIES: codeine (Hives)      FAMILY HISTORY:  No pertinent family history in first degree relatives        Social history:  Alochol:   Smoking:   Drug Use:   Marital Status:     PHYSICAL EXAMINATION:  -----------------------------  T(C): 36.9 (06-05-22 @ 08:33), Max: 37.2 (06-04-22 @ 15:53)  HR: 68 (06-05-22 @ 07:47) (59 - 88)  BP: 111/51 (06-05-22 @ 06:00) (106/53 - 143/67)  RR: 24 (06-05-22 @ 06:00) (22 - 29)  SpO2: 100% (06-05-22 @ 07:47) (97% - 100%)  Wt(kg): --    06-04 @ 07:01  -  06-05 @ 07:00  --------------------------------------------------------  IN:    Glucerna 1.5: 1320 mL    IV PiggyBack: 100 mL  Total IN: 1420 mL    OUT:    Incontinent per Collection Bag (mL): 700 mL  Total OUT: 700 mL    Total NET: 720 mL            Constitutional: well developed, normal appearance, well groomed, well nourished, no deformities and no acute distress.   Eyes: the conjunctiva exhibited no abnormalities and the eyelids demonstrated no xanthelasmas.   HEENT: normal oral mucosa, no oral pallor and no oral cyanosis.   Neck: normal jugular venous A waves present, normal jugular venous V waves present and no jugular venous obregon A waves.   Pulmonary: no respiratory distress, normal respiratory rhythm and effort, no accessory muscle use and lungs were clear to auscultation bilaterally. Anteriorly  Cardiovascular: heart rate and rhythm were normal, normal S1 and S2 and no murmur, gallop, rub, heave or thrill are present.    Musculoskeletal: the gait could not be assessed.   Extremities: no clubbing of the fingernails, no localized cyanosis, no petechial hemorrhages and no ischemic changes.   Skin: normal skin color and pigmentation, no rash, no venous stasis, no skin lesions, no skin ulcer and no xanthoma was observed.       LABS:   --------  06-05    143  |  108  |  47<H>  ----------------------------<  202<H>  5.3   |  29  |  1.22    Ca    9.2      05 Jun 2022 06:37    TPro  7.7  /  Alb  2.1<L>  /  TBili  0.5  /  DBili  x   /  AST  16  /  ALT  13  /  AlkPhos  127<H>  06-05                         11.3   5.44  )-----------( 165      ( 05 Jun 2022 06:37 )             39.7             Culture Results:   No growth to date. (06-03 @ 18:12)  Culture Results:   No growth to date. (06-03 @ 18:12)    06-03 @ 18:12    Organism --   Gram Stain Blood -- Gram Stain --  Specimen Source .Blood Blood-Peripheral  Culture-Blood --        Radiology:

## 2022-06-05 NOTE — PROGRESS NOTE ADULT - ASSESSMENT
The patient is a 76 year old female with a history of HTN, DM, CVA, dementia, chronic respiratory failure s/p trach, PEG, cardiac arrest, anemia who presents with respiratory distress.    6/5/22  Seen at [facility]  Essentially unresponsive  Lying flat, sleeping comfortably    Plan:  - Echo 5/30/22 with normal LV systolic function, mod pulm HTN-see above  - Cardiac enzymes negative  - CT chest with moderate bilateral pleural effusions  - Pleural effusions last admission consistent with exudate  - Vent settings weaned down to normal overnight  - Lower furosemide to via PEG or discontinue  - Plan for repeat thoracentesis  - Being followed by pulmonary, palliative care, ID, GI

## 2022-06-05 NOTE — PROGRESS NOTE ADULT - ASSESSMENT
79F HTN, DM2, COPD, Chronic Respiratory Failure on Trach to Vent re-admitted for Acute Respiratory Distress    Acute Respiratory Distress  Patient discharged 6/1/22 and repeat CT showing increase pleural effusions; Continue IV Zosyn  Going for repeat thoracentesis; Distress could be exacerbated by anxiety component as well  Treated with Abx for PNA on prior admission and had Thoracentesis done 1500cc on Left SIde   TTE- Normal LV, dilated RV, mod pulm htn, small pericardial effusion  Continue Lasix Daily  Pulmonary and Cardiology Input appreciated    Anemia of Chronic Disease with Iron Deficiency  Has been evaluated by GI and Hematology on prior admissions  She has Anemia of Chronic Disease but could also have intermittent GI Bleeding; Occult Blood Negative  S/P 2U PRBC Transfusion and IV Venofer on prior admission  Multivitamin daily    Acute Renal Failure on CKD 3  Baseline Cr around 1.2  Renally dose and monitor BMP and electrolytes     HTN  Hold all BP lower agents    DM2  FS and on ISS to maintain BS <180    Diet  Tube Feeds via PEG  PEG was leaking and replaced on 6/3/22    DVT Prophylaxis  Heparin    Disposition  Full Code

## 2022-06-05 NOTE — PROGRESS NOTE ADULT - ASSESSMENT
AGE/SEX.   76 f  DOA.  6/2/2022  CC .  6/2/2022 Resp distress at [facility]  PMH .  pmh Hosp stay 5/21-6/1/2022 ceftazidime avibactam 1.25x2 5/22- 5/30/2022    pmh Hosp stay given zosyn 4/21  pmh Pleural effsn   5/25/2022 1.5 l clear pl effsn removed left side IR  5/25/2022 g 183 l 144/381 .37 p 3.4/5.3 .64 p 23 l 36   5/25/2022l pl fluid  lymph pred exudate   cyto (-)     pmh TRANSFUSION.  5/23/2022 1 u prbc  pmh Pneumonia    pmh HTN,   pmh DM,   pmh CVA,   pmh  chronic respiratory failure   pmh s/p trach, PEG,   pmh cardiac arrest  REVIEW OF SYMPTOMS      Able to give (reliable) ROS  NO    PHYSICAL EXAM    Has trach  peg    HEENT Unremarkable  atraumatic   RESP Fair air entry EXP prolonged    Harsh breath sound Resp distres mild   CARDIAC S1 S2 No S3     NO JVD    ABDOMEN SOFT BS PRESENT NOT DISTENDED No hepatosplenomegaly   PEDAL EDEMA present No calf tenderness  NO rash     MAIN ISSUES .  CHF HFPEF AOC poa 6/1   VDRF pmh   FEVER 6/3/2022    COVID/ICU/CODE STATUS.                       COVID  STATUS.    6/2/2022 scv2 (-)        ICU STAY. 6/2/2022  GOC.  6/2/2022 full code     BEST PRACTICE ISSUES.                                                  HEAD OF BED ELEVATION. Yes  DVT PROPHYLAXIS.   6/1 hpsc    SQUIRES PROPHYLAXIS. 6/1 protonix 40                                                                                        DIET.  6/2/2022 glucerna 1.5 1440              VITALS/PO/IO/VENT/DRIPS.    6/5/2022 afeb 76 130/60   6/5/2022 ac 24/400/5/.4      PROBLEM DATA/ASSESSMENT RECOMMENDATIONS (A/R).    HEMODYNAMICS.   Monitor and optimize bp   Target MAP to miguel  65 (+)    RESP.   Monitor and optimize  po   Target po to remain 90-95%    ABG.   6/3/2022  6a 24/400/5/.4 750/41/62     PMH/PSH PROBLEMS.  Management continued/modified as indicated    VENT MANAGEMENT.   HOB elevation  Target Pplat 30 (-)  Target PO 90-95%  Target pH 730 (+)  Daily spontaneous breathing trials   Daily sedation vacation         INFECTION SOURCE DD.   INFECTION A/R.   Shje is status post recent course of abio ceftazidime avibactam 1.25x2 5/22- 5/30/2022    6/3 now on zosyn  Pt has crp in sputum   will follow with id ama    INFECTION AUGUST.  W 6/2-6/3-6/5/2022 w 7.5 - 11.9 - 5.4   IAMGING.   Cxr 6/1/2022 diffuse advanced bl infiltrates with moderate basal effsns increased from 5/31   ct chest 6/1/2022 Pulm edema Worsening bl effsns likely reflecting progressive chf Increased bl patchy airspace opac concerning for superimposed mf pneum   MICROBIO.  rvp 6/2 (-)   Trach 6/2 1) Mod gnr 2) Mod gn coccobacilli  SERRATIA CARBAPENEM RESISTANT PSEUDOMONAS   6/3 urine c (-)   6/3 blod c (-)   ABIO.  6/3 zosyn     COPD.  6/1/2022 duoneb.4  CHF.  echo 5/30/2022 ef 65% pasp 60 dialted rv   6/2/2022 lasix 40  ANEMIA.  Hb 6/2-6/3/2022 Hb 10.6 - 11.3   RENAL.  Na 6/2-6/3/2022 Na 145 - 144  Cr 6/2-6/3-6/4-6/5/2022 Cr 1.1- 1.2 - 1.4 - 1.2   DM.   6/1 riss   6/1 lantus 8  ANXIETY.  6/2/2022 lorazepam 2.6   DECUB.   6/1 collagenase r gluteal wound     TIME SPENT   Over 39 minutes aggregate critical care time spent on encounter; activities included   direct patient care, counseling and/or coordinating care reviewing notes, lab data/ imaging , discussion with multidisciplinary team/ patient  /family and explaining in detail risks, benefits, alternatives  of the recommendations     CHAPINCITO Cooley f

## 2022-06-05 NOTE — PROGRESS NOTE ADULT - SUBJECTIVE AND OBJECTIVE BOX
INTERVAL HPI/OVERNIGHT EVENTS:  No new overnight event.  No N/V/D.  Tolerating diet.  no leaking peg    Allergies    codeine (Hives)    Intolerances      General:  No wt loss, fevers, chills, night sweats, fatigue,   Eyes:  Good vision, no reported pain  ENT:  No sore throat, pain, runny nose, dysphagia  CV:  No pain, palpitations, hypo/hypertension  Resp:  No dyspnea, cough, tachypnea, wheezing  GI:  No pain, No nausea, No vomiting, No diarrhea, No constipation, No weight loss, No fever, No pruritis, No rectal bleeding, No tarry stools, No dysphagia,  :  No pain, bleeding, incontinence, nocturia  Muscle:  No pain, weakness  Neuro:  No weakness, tingling, memory problems  Psych:  No fatigue, insomnia, mood problems, depression  Endocrine:  No polyuria, polydipsia, cold/heat intolerance  Heme:  No petechiae, ecchymosis, easy bruisability  Skin:  No rash, tattoos, scars, edema      PHYSICAL EXAM:   Vital Signs:  Vital Signs Last 24 Hrs  T(C): 36.7 (2022 12:35), Max: 37.2 (2022 15:53)  T(F): 98.1 (2022 12:35), Max: 99 (2022 15:53)  HR: 73 (2022 11:09) (59 - 88)  BP: 122/58 (2022 10:00) (106/53 - 143/67)  BP(mean): 78 (2022 10:00) (70 - 89)  RR: 24 (2022 10:00) (24 - 29)  SpO2: 100% (2022 11:09) (100% - 100%)  Daily     Daily Weight in k.7 (2022 03:58)I&O's Summary    2022 07:01  -  2022 07:00  --------------------------------------------------------  IN: 1420 mL / OUT: 700 mL / NET: 720 mL        GENERAL:  Appears stated age, well-groomed, well-nourished, no distress  HEENT:  NC/AT,  conjunctivae clear and pink, no thyromegaly, nodules, adenopathy, no JVD, sclera -anicteric  CHEST:  Full & symmetric excursion, no increased effort, breath sounds clear  HEART:  Regular rhythm, S1, S2, no murmur/rub/S3/S4, no abdominal bruit, no edema  ABDOMEN:  Soft, non-tender, non-distended, normoactive bowel sounds,  no masses ,no hepato-splenomegaly, no signs of chronic liver disease  EXTEREMITIES:  no cyanosis,clubbing or edema  SKIN:  No rash/erythema/ecchymoses/petechiae/wounds/abscess/warm/dry  NEURO:  Alert, oriented, no asterixis, no tremor, no encephalopathy      LABS:                        11.3   5.44  )-----------( 165      ( 2022 06:37 )             39.7     06-05    143  |  108  |  47<H>  ----------------------------<  202<H>  5.3   |  29  |  1.22    Ca    9.2      2022 06:37    TPro  7.7  /  Alb  2.1<L>  /  TBili  0.5  /  DBili  x   /  AST  16  /  ALT  13  /  AlkPhos  127<H>  06-05      Urinalysis Basic - ( 2022 21:53 )    Color: Yellow / Appearance: Clear / S.005 / pH: x  Gluc: x / Ketone: Negative  / Bili: Negative / Urobili: Negative mg/dL   Blood: x / Protein: 30 mg/dL / Nitrite: Negative   Leuk Esterase: Small / RBC: 0-2 /HPF / WBC 6-10   Sq Epi: x / Non Sq Epi: Negative / Bacteria: Moderate      amylase   lipase  RADIOLOGY & ADDITIONAL TESTS:

## 2022-06-05 NOTE — PROGRESS NOTE ADULT - ASSESSMENT
80 y/o F with PMH of HTN, DM type 2, Cardiac Arrest, CVA, COPD, Chronic Respiratory Failure Vent Dependant s/p trach & PEG, Multiple Hospital Admissions for Aspiration & vent associated PNA, COVID-19 Infection, Anemia with GI blood loss, GERD, and Advanced Dementia presented with acute respiratory distress.      ABX  GI eval noted  Pulm follow up  I and O  pleurocentesis - pleurx discussion    HCP Marciano -  - pt is full code  s/p emp ABX - broad spectrum for poss PNA  cvs rx regimen  bronchodilators  oral and skin care  assist with needs - ADL  monitor VS and HD  CT imaging reviewed  Old records reviewed  suction PRN  trach care  peg care  nutritional optimization  decubitus prevention

## 2022-06-06 LAB
ALBUMIN SERPL ELPH-MCNC: 2.1 G/DL — LOW (ref 3.3–5)
ALP SERPL-CCNC: 122 U/L — HIGH (ref 30–120)
ALT FLD-CCNC: 18 U/L DA — SIGNIFICANT CHANGE UP (ref 10–60)
ANION GAP SERPL CALC-SCNC: 9 MMOL/L — SIGNIFICANT CHANGE UP (ref 5–17)
AST SERPL-CCNC: 16 U/L — SIGNIFICANT CHANGE UP (ref 10–40)
BASOPHILS # BLD AUTO: 0.02 K/UL — SIGNIFICANT CHANGE UP (ref 0–0.2)
BASOPHILS NFR BLD AUTO: 0.4 % — SIGNIFICANT CHANGE UP (ref 0–2)
BILIRUB SERPL-MCNC: 0.6 MG/DL — SIGNIFICANT CHANGE UP (ref 0.2–1.2)
BUN SERPL-MCNC: 40 MG/DL — HIGH (ref 7–23)
CALCIUM SERPL-MCNC: 9.2 MG/DL — SIGNIFICANT CHANGE UP (ref 8.4–10.5)
CHLORIDE SERPL-SCNC: 107 MMOL/L — SIGNIFICANT CHANGE UP (ref 96–108)
CO2 SERPL-SCNC: 26 MMOL/L — SIGNIFICANT CHANGE UP (ref 22–31)
CREAT SERPL-MCNC: 1.14 MG/DL — SIGNIFICANT CHANGE UP (ref 0.5–1.3)
EGFR: 49 ML/MIN/1.73M2 — LOW
EOSINOPHIL # BLD AUTO: 0.2 K/UL — SIGNIFICANT CHANGE UP (ref 0–0.5)
EOSINOPHIL NFR BLD AUTO: 4 % — SIGNIFICANT CHANGE UP (ref 0–6)
GLUCOSE SERPL-MCNC: 149 MG/DL — HIGH (ref 70–99)
HCT VFR BLD CALC: 35.5 % — SIGNIFICANT CHANGE UP (ref 34.5–45)
HGB BLD-MCNC: 10.4 G/DL — LOW (ref 11.5–15.5)
IMM GRANULOCYTES NFR BLD AUTO: 0.4 % — SIGNIFICANT CHANGE UP (ref 0–1.5)
LYMPHOCYTES # BLD AUTO: 1.08 K/UL — SIGNIFICANT CHANGE UP (ref 1–3.3)
LYMPHOCYTES # BLD AUTO: 21.7 % — SIGNIFICANT CHANGE UP (ref 13–44)
MCHC RBC-ENTMCNC: 26.1 PG — LOW (ref 27–34)
MCHC RBC-ENTMCNC: 29.3 GM/DL — LOW (ref 32–36)
MCV RBC AUTO: 89 FL — SIGNIFICANT CHANGE UP (ref 80–100)
MONOCYTES # BLD AUTO: 0.45 K/UL — SIGNIFICANT CHANGE UP (ref 0–0.9)
MONOCYTES NFR BLD AUTO: 9 % — SIGNIFICANT CHANGE UP (ref 2–14)
NEUTROPHILS # BLD AUTO: 3.21 K/UL — SIGNIFICANT CHANGE UP (ref 1.8–7.4)
NEUTROPHILS NFR BLD AUTO: 64.5 % — SIGNIFICANT CHANGE UP (ref 43–77)
NRBC # BLD: 0 /100 WBCS — SIGNIFICANT CHANGE UP (ref 0–0)
PLATELET # BLD AUTO: 252 K/UL — SIGNIFICANT CHANGE UP (ref 150–400)
POTASSIUM SERPL-MCNC: 5.1 MMOL/L — SIGNIFICANT CHANGE UP (ref 3.5–5.3)
POTASSIUM SERPL-SCNC: 5.1 MMOL/L — SIGNIFICANT CHANGE UP (ref 3.5–5.3)
PROT SERPL-MCNC: 6.8 G/DL — SIGNIFICANT CHANGE UP (ref 6–8.3)
RBC # BLD: 3.99 M/UL — SIGNIFICANT CHANGE UP (ref 3.8–5.2)
RBC # FLD: 19.6 % — HIGH (ref 10.3–14.5)
SODIUM SERPL-SCNC: 142 MMOL/L — SIGNIFICANT CHANGE UP (ref 135–145)
WBC # BLD: 4.98 K/UL — SIGNIFICANT CHANGE UP (ref 3.8–10.5)
WBC # FLD AUTO: 4.98 K/UL — SIGNIFICANT CHANGE UP (ref 3.8–10.5)

## 2022-06-06 PROCEDURE — 99233 SBSQ HOSP IP/OBS HIGH 50: CPT

## 2022-06-06 RX ORDER — FUROSEMIDE 40 MG
40 TABLET ORAL DAILY
Refills: 0 | Status: DISCONTINUED | OUTPATIENT
Start: 2022-06-06 | End: 2022-06-08

## 2022-06-06 RX ADMIN — PIPERACILLIN AND TAZOBACTAM 25 GRAM(S): 4; .5 INJECTION, POWDER, LYOPHILIZED, FOR SOLUTION INTRAVENOUS at 16:04

## 2022-06-06 RX ADMIN — SENNA PLUS 3 TABLET(S): 8.6 TABLET ORAL at 21:43

## 2022-06-06 RX ADMIN — Medication 1 MILLIGRAM(S): at 11:41

## 2022-06-06 RX ADMIN — PIPERACILLIN AND TAZOBACTAM 25 GRAM(S): 4; .5 INJECTION, POWDER, LYOPHILIZED, FOR SOLUTION INTRAVENOUS at 08:29

## 2022-06-06 RX ADMIN — Medication 2 MILLIGRAM(S): at 16:45

## 2022-06-06 RX ADMIN — NYSTATIN CREAM 1 APPLICATION(S): 100000 CREAM TOPICAL at 06:03

## 2022-06-06 RX ADMIN — Medication 4: at 11:42

## 2022-06-06 RX ADMIN — CHLORHEXIDINE GLUCONATE 15 MILLILITER(S): 213 SOLUTION TOPICAL at 18:30

## 2022-06-06 RX ADMIN — INSULIN GLARGINE 8 UNIT(S): 100 INJECTION, SOLUTION SUBCUTANEOUS at 08:29

## 2022-06-06 RX ADMIN — PANTOPRAZOLE SODIUM 40 MILLIGRAM(S): 20 TABLET, DELAYED RELEASE ORAL at 11:41

## 2022-06-06 RX ADMIN — Medication 1 APPLICATION(S): at 11:45

## 2022-06-06 RX ADMIN — HEPARIN SODIUM 5000 UNIT(S): 5000 INJECTION INTRAVENOUS; SUBCUTANEOUS at 06:03

## 2022-06-06 RX ADMIN — METHOCARBAMOL 500 MILLIGRAM(S): 500 TABLET, FILM COATED ORAL at 06:04

## 2022-06-06 RX ADMIN — Medication 2 MILLIGRAM(S): at 01:20

## 2022-06-06 RX ADMIN — NYSTATIN CREAM 1 APPLICATION(S): 100000 CREAM TOPICAL at 18:43

## 2022-06-06 RX ADMIN — Medication 2 MILLIGRAM(S): at 21:43

## 2022-06-06 RX ADMIN — METHOCARBAMOL 500 MILLIGRAM(S): 500 TABLET, FILM COATED ORAL at 18:30

## 2022-06-06 RX ADMIN — PIPERACILLIN AND TAZOBACTAM 25 GRAM(S): 4; .5 INJECTION, POWDER, LYOPHILIZED, FOR SOLUTION INTRAVENOUS at 01:21

## 2022-06-06 RX ADMIN — Medication 2 MILLIGRAM(S): at 18:30

## 2022-06-06 RX ADMIN — Medication 1 TABLET(S): at 11:41

## 2022-06-06 RX ADMIN — SIMETHICONE 80 MILLIGRAM(S): 80 TABLET, CHEWABLE ORAL at 06:02

## 2022-06-06 RX ADMIN — Medication 2 MILLIGRAM(S): at 10:35

## 2022-06-06 RX ADMIN — Medication 40 MILLIGRAM(S): at 16:04

## 2022-06-06 RX ADMIN — HEPARIN SODIUM 5000 UNIT(S): 5000 INJECTION INTRAVENOUS; SUBCUTANEOUS at 21:44

## 2022-06-06 RX ADMIN — Medication 1 APPLICATION(S): at 14:33

## 2022-06-06 RX ADMIN — CHLORHEXIDINE GLUCONATE 15 MILLILITER(S): 213 SOLUTION TOPICAL at 06:03

## 2022-06-06 RX ADMIN — Medication 2 MILLIGRAM(S): at 15:05

## 2022-06-06 RX ADMIN — SIMETHICONE 80 MILLIGRAM(S): 80 TABLET, CHEWABLE ORAL at 11:42

## 2022-06-06 RX ADMIN — Medication 2 MILLIGRAM(S): at 06:02

## 2022-06-06 RX ADMIN — HEPARIN SODIUM 5000 UNIT(S): 5000 INJECTION INTRAVENOUS; SUBCUTANEOUS at 15:04

## 2022-06-06 NOTE — PROGRESS NOTE ADULT - ASSESSMENT
This is an 80 y/o F with PMH of HTN, DM type 2, Cardiac Arrest, CVA, COPD, Chronic Respiratory Failure Vent Dependant s/p trach & PEG, Multiple Hospital Admissions for Aspiration & vent associated PNA, COVID-19 Infection, Anemia with GI blood loss, GERD, and Advanced Dementia who was sent from [facility] facility for acute respiratory distress.     Sepsis 2/2 VAP/PNA c/b pleural effusions  Pleural Effusions: parapneumonic vs. empyema  - prior sputum CRE P. aeruginosa and MDRO S. marascens  - 5/23 sputum cx w/ moderate CRE Pseudomonas again and mixed GNR alejandro   - s/p avycaz  x7 day completed 5/28  Pt was discharged on 6/1 and then re-admitted again for recurrent pleural effusions.   Restarted on zosyn.   - 6/2 sputum cx again w/ CRE Pseudomonas and MDRO S. marascens  Plan:  C/w zosyn--may need to escalate in setting of serratia susceptibilities  C/w vent management per ICU      Infectious Diseases will continue to follow. Please call with any questions.   Andressa Brantley M.D.  [facility], Division of Infectious Diseases [phone]

## 2022-06-06 NOTE — PROGRESS NOTE ADULT - ASSESSMENT
79F HTN, DM2, COPD, Chronic Respiratory Failure on Trach to Vent re-admitted for Acute Respiratory Distress    Acute Respiratory Distress  Patient discharged 6/1/22 and repeat CT showing increase pleural effusions; Continue IV Zosyn  Going for repeat thoracentesis but discussing pleurex; Distress could be exacerbated by anxiety component as well  Treated with Abx for PNA on prior admission and had Thoracentesis done 1500cc on Left Side   TTE- Normal LV, dilated RV, mod pulm htn, small pericardial effusion  Continue Lasix Daily  Pulmonary and Cardiology Input appreciated    Anemia of Chronic Disease with Iron Deficiency  Has been evaluated by GI and Hematology on prior admissions  She has Anemia of Chronic Disease but could also have intermittent GI Bleeding; Occult Blood Negative  S/P 2U PRBC Transfusion and IV Venofer on prior admission  Multivitamin daily    Acute Renal Failure on CKD 3  Baseline Cr around 1.2  Renally dose and monitor BMP and electrolytes     HTN  Hold all BP lower agents    DM2  FS and on ISS to maintain BS <180    Diet  Tube Feeds via PEG  PEG was leaking and replaced on 6/3/22    DVT Prophylaxis  Heparin    Disposition  Full Code

## 2022-06-06 NOTE — PROGRESS NOTE ADULT - SUBJECTIVE AND OBJECTIVE BOX
Subjective: Patient seen and examined. No overnight events.     MEDICATIONS  (STANDING):  chlorhexidine 0.12% Liquid 15 milliLiter(s) Oral Mucosa every 12 hours  collagenase Ointment 1 Application(s) Topical daily  dextrose 5%. 1000 milliLiter(s) (50 mL/Hr) IV Continuous <Continuous>  dextrose 5%. 1000 milliLiter(s) (100 mL/Hr) IV Continuous <Continuous>  dextrose 50% Injectable 25 Gram(s) IV Push once  dextrose 50% Injectable 12.5 Gram(s) IV Push once  dextrose 50% Injectable 25 Gram(s) IV Push once  folic acid 1 milliGRAM(s) Oral daily  glucagon  Injectable 1 milliGRAM(s) IntraMuscular once  heparin   Injectable 5000 Unit(s) SubCutaneous every 8 hours  insulin glargine Injectable (LANTUS) 8 Unit(s) SubCutaneous every morning  insulin lispro (ADMELOG) corrective regimen sliding scale   SubCutaneous every 6 hours  lactobacillus acidophilus 1 Tablet(s) Oral daily  LORazepam     Tablet 2 milliGRAM(s) Oral every 4 hours  methocarbamol 500 milliGRAM(s) Oral two times a day  multivitamin 1 Tablet(s) Oral daily  nystatin Powder 1 Application(s) Topical every 12 hours  pantoprazole  Injectable 40 milliGRAM(s) IV Push daily  piperacillin/tazobactam IVPB.. 3.375 Gram(s) IV Intermittent every 8 hours  povidone iodine 10% Solution 1 Application(s) Topical daily  senna 3 Tablet(s) Oral at bedtime  simethicone 80 milliGRAM(s) Chew every 6 hours    MEDICATIONS  (PRN):  acetaminophen    Suspension .. 650 milliGRAM(s) Oral every 6 hours PRN Temp greater or equal to 38C (100.4F), Mild Pain (1 - 3)  albuterol/ipratropium for Nebulization 3 milliLiter(s) Nebulizer every 6 hours PRN Shortness of Breath and/or Wheezing  dextrose Oral Gel 15 Gram(s) Oral once PRN Blood Glucose LESS THAN 70 milliGRAM(s)/deciliter  LORazepam   Injectable 2 milliGRAM(s) IV Push every 4 hours PRN Agitation      Allergies    codeine (Hives)    Intolerances        Vital Signs Last 24 Hrs  T(C): 36.6 (06 Jun 2022 08:15), Max: 37.2 (05 Jun 2022 14:00)  T(F): 97.9 (06 Jun 2022 08:15), Max: 98.9 (05 Jun 2022 14:00)  HR: 57 (06 Jun 2022 08:00) (53 - 76)  BP: 105/52 (06 Jun 2022 08:00) (105/52 - 135/61)  BP(mean): 69 (06 Jun 2022 08:00) (69 - 84)  RR: 24 (06 Jun 2022 08:00) (21 - 32)  SpO2: 100% (06 Jun 2022 08:00) (99% - 100%)    PHYSICAL EXAM:  GENERAL: chronically ill appearing, emaciated, NAD, obtunded  HEAD:  atraumatic  EYES: conjunctiva clear  NECK: (+)trach in place, clean  RESPIRATORY: mechanical breath sounds, vent in place, no gross crackles or rales  CARDIOVASCULAR:  regular rate and rhythm, no murmurs or rubs or gallops, 2+ peripheral pulses  GASTROINTESTINAL:  soft, +PEG in place, clean and dry as well, nondistended, bowel sounds present  EXTREMITIES: no clubbing or cyanosis or edema  MUSCULOSKELETAL:  (+)diffuse contractures in all joints  NERVOUS SYSTEM: does not respond to lesia      LABS:                        10.4   4.98  )-----------( 252      ( 06 Jun 2022 06:40 )             35.5     06 Jun 2022 06:40    142    |  107    |  40     ----------------------------<  149    5.1     |  26     |  1.14     Ca    9.2        06 Jun 2022 06:40    TPro  6.8    /  Alb  2.1    /  TBili  0.6    /  DBili  x      /  AST  16     /  ALT  18     /  AlkPhos  122    06 Jun 2022 06:40        CAPILLARY BLOOD GLUCOSE      POCT Blood Glucose.: 166 mg/dL (06 Jun 2022 08:27)  POCT Blood Glucose.: 134 mg/dL (06 Jun 2022 05:59)  POCT Blood Glucose.: 139 mg/dL (05 Jun 2022 23:22)  POCT Blood Glucose.: 159 mg/dL (05 Jun 2022 18:06)  POCT Blood Glucose.: 191 mg/dL (05 Jun 2022 12:46)      RADIOLOGY & ADDITIONAL TESTS:    Imaging Personally Reviewed:  [ ] YES     Consultant(s) Notes Reviewed:      Care Discussed with Consultants/Other Providers:    Advanced Directives: [ ] DNR  [ ] No feeding tube  [ ] MOLST in chart  [ ] MOLST completed today  [ ] Unknown

## 2022-06-06 NOTE — CONSULT NOTE ADULT - ASSESSMENT
Prerenal azotemia, CKD 3: on diuretics  Pneumonia, Pleural effusions  Chronic respiratory failure, on vent  Diabetes    On IV diuresis. Will follow renal indices. On IV abx. Monitor blood sugar levels. Insulin coverage as needed.  Avoid nephrotoxic meds as possible. Will follow electrolytes and renal function trend.   Further recommendations pending clinical course. Thank you for the courtesy of this referral.

## 2022-06-06 NOTE — PROGRESS NOTE ADULT - SUBJECTIVE AND OBJECTIVE BOX
Patient is a 76y old  Female who presents with a chief complaint of Respiratory distress. (06 Jun 2022 16:20)      BRIEF HOSPITAL COURSE: 75 yo female with PMHx chronic respiratory failure s/p trach and peg, HTN, T2DM, CVA, GERD, and dementia with recent admission, was here until 6/1 after rx for VAP/PNA. Admitted again 6/1 after 5 hrs  after with hypoxemia due to recurrent pleural effusions.     Events last 24 hours: Hemodynamically stable on minimal vent settings. Continues to have thick white secretions      PAST MEDICAL & SURGICAL HISTORY:  Dementia of frontal lobe type      Aphasic stroke      Diabetes mellitus      Respiratory failure      Hypertension      GERD (gastroesophageal reflux disease)      Constipation      Respiratory failure      CVA (cerebral vascular accident)      HTN (hypertension)      DM (diabetes mellitus)      Advanced dementia      COVID-19 virus detected      Quadriplegia      Pneumonia      Type II diabetes mellitus      Hx of appendectomy      Gastrostomy in place      Tracheostomy in place      Tracheostomy tube present      Feeding by G-tube          Review of Systems:  YULIYA due to clinical condition    Medications:  piperacillin/tazobactam IVPB.. 3.375 Gram(s) IV Intermittent every 8 hours    furosemide   Injectable 40 milliGRAM(s) IV Push daily    albuterol/ipratropium for Nebulization 3 milliLiter(s) Nebulizer every 6 hours PRN    acetaminophen    Suspension .. 650 milliGRAM(s) Oral every 6 hours PRN  LORazepam     Tablet 2 milliGRAM(s) Oral every 4 hours  LORazepam   Injectable 2 milliGRAM(s) IV Push every 4 hours PRN  methocarbamol 500 milliGRAM(s) Oral two times a day      heparin   Injectable 5000 Unit(s) SubCutaneous every 8 hours    pantoprazole  Injectable 40 milliGRAM(s) IV Push daily  senna 3 Tablet(s) Oral at bedtime  simethicone 80 milliGRAM(s) Chew every 6 hours      dextrose 50% Injectable 25 Gram(s) IV Push once  dextrose 50% Injectable 12.5 Gram(s) IV Push once  dextrose 50% Injectable 25 Gram(s) IV Push once  dextrose Oral Gel 15 Gram(s) Oral once PRN  glucagon  Injectable 1 milliGRAM(s) IntraMuscular once  insulin glargine Injectable (LANTUS) 8 Unit(s) SubCutaneous every morning  insulin lispro (ADMELOG) corrective regimen sliding scale   SubCutaneous every 6 hours    dextrose 5%. 1000 milliLiter(s) IV Continuous <Continuous>  dextrose 5%. 1000 milliLiter(s) IV Continuous <Continuous>  folic acid 1 milliGRAM(s) Oral daily  multivitamin 1 Tablet(s) Oral daily      chlorhexidine 0.12% Liquid 15 milliLiter(s) Oral Mucosa every 12 hours  collagenase Ointment 1 Application(s) Topical daily  nystatin Powder 1 Application(s) Topical every 12 hours  povidone iodine 10% Solution 1 Application(s) Topical daily    lactobacillus acidophilus 1 Tablet(s) Oral daily      Mode: AC/ CMV (Assist Control/ Continuous Mandatory Ventilation)  RR (machine): 24  TV (machine): 400  FiO2: 30  PEEP: 5  ITime: 0.9  MAP: 15  PIP: 33      ICU Vital Signs Last 24 Hrs  T(C): 37 (06 Jun 2022 17:15), Max: 37.2 (06 Jun 2022 00:03)  T(F): 98.6 (06 Jun 2022 17:15), Max: 98.9 (06 Jun 2022 00:03)  HR: 61 (06 Jun 2022 21:13) (53 - 70)  BP: 100/59 (06 Jun 2022 20:00) (100/59 - 136/62)  BP(mean): 72 (06 Jun 2022 20:00) (69 - 85)  ABP: --  ABP(mean): --  RR: 24 (06 Jun 2022 20:00) (21 - 24)  SpO2: 99% (06 Jun 2022 21:13) (99% - 100%)          I&O's Detail    05 Jun 2022 07:01  -  06 Jun 2022 07:00  --------------------------------------------------------  IN:    Free Water: 500 mL    Glucerna 1.5: 1440 mL    IV PiggyBack: 300 mL  Total IN: 2240 mL    OUT:    Voided (mL): 800 mL  Total OUT: 800 mL    Total NET: 1440 mL      06 Jun 2022 07:01  -  06 Jun 2022 23:04  --------------------------------------------------------  IN:    Free Water: 375 mL    Glucerna 1.5: 900 mL    IV PiggyBack: 100 mL  Total IN: 1375 mL    OUT:  Total OUT: 0 mL    Total NET: 1375 mL          LABS:                        10.4   4.98  )-----------( 252      ( 06 Jun 2022 06:40 )             35.5     06-06    142  |  107  |  40<H>  ----------------------------<  149<H>  5.1   |  26  |  1.14    Ca    9.2      06 Jun 2022 06:40    TPro  6.8  /  Alb  2.1<L>  /  TBili  0.6  /  DBili  x   /  AST  16  /  ALT  18  /  AlkPhos  122<H>  06-06          CAPILLARY BLOOD GLUCOSE      POCT Blood Glucose.: 144 mg/dL (06 Jun 2022 18:35)        CULTURES:  Culture Results:   <10,000 CFU/mL Normal Urogenital Elvira (06-03 @ 21:53)  Culture Results:   No growth to date. (06-03 @ 18:12)  Culture Results:   No growth to date. (06-03 @ 18:12)  Culture Results:   Numerous Serratia marcescens  Moderate Pseudomonas aeruginosa (Carbapenem Resistant)  Normal Respiratory Elvira present (06-02 @ 14:57)  Rapid RVP Result: NotDetec (06-02 @ 14:56)      Physical Examination:      General: Ill appearing, trach to vent    HEENT: Pupils equal, reactive to light. Symmetric. No scleral icterus or injection.    PULM: Clear/diminshed to auscultation B/L. No wheezes, rales, or rhonchi apprecaited.  significant white sputum production, no increased respiratory effort.    NECK: Supple, no lymphadenopathy, trachea midline.    CVS: Regular rate and rhythm, no murmurs appreciated, +s1/s2.    ABD: Soft, nondistended, nontender, normoactive bowel sounds.    EXT: No edema, nontender.    SKIN: Warm and well perfused, no rashes noted.    NEURO: YULIYA due to clinical condition and baseline dementia    RADIOLOGY: < from: CT Chest No Cont (06.01.22 @ 23:29) >  PROCEDURE DATE:  06/01/2022          INTERPRETATION:  CLINICAL INFORMATION: Respiratory distress    COMPARISON: 5/25/2022.    CONTRAST/COMPLICATIONS:  IV Contrast: NONE  Oral Contrast: NONE  Complications: None reported at time of study completion    PROCEDURE:  CT of the Chest was performed.  Sagittal and coronal reformats were performed.    FINDINGS:  Moderate bilateral pleural effusions noted with interval increase in size   as well as increase in bibasilar dependent compressive atelectasis.   Widespread groundglass parenchymal opacification and  interlobular septal   thickening similar to prior most compatible with pulmonary edema.  Increase in bilateral patchy scattered airspace opacities with peripheral   predominance suspicious for  superimposed multifocal pneumonia.   Differential diagnosis includes Covid 19 infection.  Retained secretions in the right mainstem bronchus. Tracheostomy cannula   reidentified in situ. Diffuselypatulous thoracic esophagus similar to   prior.  Gastrostomy tube in position.  The remainder study unchanged.    IMPRESSION:  Pulmonary edema. Worsening of bilateral pleural effusions likely   reflecting progressive CHF.    Increase in bilateral patchy airspace opacities concerning for   superimposed multifocal pneumonia    --- End of Report ---    < end of copied text >

## 2022-06-06 NOTE — PROGRESS NOTE ADULT - ASSESSMENT
The patient is a 76 year old female with a history of HTN, DM, CVA, dementia, chronic respiratory failure s/p trach, PEG, cardiac arrest, anemia who presents with respiratory distress.    Plan:  - Echo 5/30/22 with normal LV systolic function, mod pulm HTN  - Cardiac enzymes negative  - CT chest with moderate bilateral pleural effusions  - Pleural effusions last admission consistent with exudate  - Vent settings weaned down to normal overnight  - On zosyn  - Pulm and ID follow-up

## 2022-06-06 NOTE — PROGRESS NOTE ADULT - ASSESSMENT
78 y/o F with PMH of HTN, DM type 2, Cardiac Arrest, CVA, COPD, Chronic Respiratory Failure Vent Dependant s/p trach & PEG, Multiple Hospital Admissions for Aspiration & vent associated PNA, COVID-19 Infection, Anemia with GI blood loss, GERD, and Advanced Dementia presented with acute respiratory distress.      ABX  GI eval noted  Pulm follow up  I and O  pleurocentesis - pleurx discussion    HCP Macriano -  - pt is full code  s/p emp ABX - broad spectrum for poss PNA  cvs rx regimen  bronchodilators  oral and skin care  assist with needs - ADL  monitor VS and HD  CT imaging reviewed  Old records reviewed  suction PRN  trach care  peg care  nutritional optimization  decubitus prevention

## 2022-06-06 NOTE — PROGRESS NOTE ADULT - ASSESSMENT
75 yo female with PMHx chronic respiratory failure s/p trach and peg, HTN, T2DM, CVA, GERD, and dementia    1. Acute on chronic Respiratory failure  2. Pulmonary edema  3. Bilateral pleural effusion  4. CHF HFPEF  5. RYAN on CKD    Neuro:  - Avoid Neuro Deliriogenic / sedative medications  - HOB > 30 degrees    CV:  - Optimize BP and target MAP 65 or greater  - Avoiding fluid overload  - IV diuresis, LAsix 40 mg daily    Pulm:  - Low TV lung protective strategies 4-6mL/kg,  - will utilize PEEP for alveolar recruitment is pt desats  - Actively titrating ventilator settings to maintain SpO2 > 92%,  - Daily spontaneous breathing trial if clinical condition warrants  - Vent Bundle in SITU  - End Tidal CO2 monitoring  - No indication for Pleurex at this time may ne re thoracentesis                  GI:  - Cont IV Protonix 40mg IVP Daily   - Enteral feeds as tolerated to avoid Stress ulceration and optimize nutritional state    Renal:  - Even to net negative fluid balance as tolerated by hemodynamics and renal fx.    - Imprvong Renal Function Continue to monitor Bun/Cr and UOP  - Replacing electrolytes as needed with Goal K> 4, PO> 3, Mg> 2               - Strict I&O's  - Avoid Nephro toxic medication    Heme:  - Heparin for DVT prophylaxis    ID:  - 6/2 sputum cx again w/ CRE Pseudomonas and MDRO S. marascens  C/w zosyn, ID following  - Microbiology and Radiology reviewed   - trend CBC with diff, CMP  and fever curve    Endo:  - ISS for aggressive glycemic control to limit FS glucose to < 180mg/dl.    Critical Care Time:  40 minutes were spent assessing the patient's presenting problems of acute illness that pose a high probability of life threatening  deterioration or end organ damage / dysfunction.  Medical desicion making includes initiation / continuation of plan or care review data/ labwork/ radiographic study, direct patient care bedside ,  discussions with  consultants regarding care,  evaluation and interpretation of vital signs, any necessary ventilator management,   NIV or BIPAP changes  or initiation,    discussions with multidisipliary team,  am or pm rounds, discussions of goals of care with patient and family all non-inclusive of procedures.   77 yo female with PMHx chronic respiratory failure s/p trach and peg, HTN, T2DM, CVA, GERD, and dementia    1. Acute on chronic Respiratory failure  2. Pulmonary edema  3. Bilateral pleural effusion  4. CHF HFPEF  5. RYAN on CKD    Neuro:  - Avoid Neuro Deliriogenic / sedative medications  - HOB > 30 degrees    CV:  - Optimize BP and target MAP 65 or greater  - Avoiding fluid overload  - IV diuresis, LAsix 40 mg daily    Pulm:  - Low TV lung protective strategies 4-6mL/kg,  - will utilize PEEP for alveolar recruitment is pt desats  - Actively titrating ventilator settings to maintain SpO2 > 92%,  - Daily spontaneous breathing trial if clinical condition warrants  - Vent Bundle in SITU  - End Tidal CO2 monitoring  - No indication for Pleurex at this time may need re thoracentesis                  GI:  - Cont IV Protonix 40mg IVP Daily   - Enteral feeds as tolerated to avoid Stress ulceration and optimize nutritional state    Renal:  - Even to net negative fluid balance as tolerated by hemodynamics and renal fx.    - Improving Renal Function Continue to monitor Bun/Cr and UOP  - Replacing electrolytes as needed with Goal K> 4, PO> 3, Mg> 2               - Strict I&O's  - Avoid Nephro toxic medication    Heme:  - Heparin for DVT prophylaxis    ID:  - 6/2 sputum cx again w/ CRE Pseudomonas and MDRO S. marascens  C/w zosyn, ID following  - Microbiology and Radiology reviewed   - trend CBC with diff, CMP  and fever curve    Endo:  - ISS for aggressive glycemic control to limit FS glucose to < 180mg/dl.    Critical Care Time:  40 minutes were spent assessing the patient's presenting problems of acute illness that pose a high probability of life threatening  deterioration or end organ damage / dysfunction.  Medical desicion making includes initiation / continuation of plan or care review data/ labwork/ radiographic study, direct patient care bedside ,  discussions with  consultants regarding care,  evaluation and interpretation of vital signs, any necessary ventilator management,   NIV or BIPAP changes  or initiation,    discussions with multidisipliary team,  am or pm rounds, discussions of goals of care with patient and family all non-inclusive of procedures.

## 2022-06-06 NOTE — PROGRESS NOTE ADULT - SUBJECTIVE AND OBJECTIVE BOX
[facility], Division of Infectious Diseases  MOIZ Lora Y. Patel, S. Shah, G. [facility]  [phone]    Name: BREA BECKHAM  Age: 76y  Gender: Female  MRN: [id number]    Interval History:  Patient seen and examined at bedside this morning in SPCU  No acute overnight events. Afebrile  Notes reviewed    Antibiotics:  piperacillin/tazobactam IVPB.. 3.375 Gram(s) IV Intermittent every 8 hours      Medications:  acetaminophen    Suspension .. 650 milliGRAM(s) Oral every 6 hours PRN  albuterol/ipratropium for Nebulization 3 milliLiter(s) Nebulizer every 6 hours PRN  chlorhexidine 0.12% Liquid 15 milliLiter(s) Oral Mucosa every 12 hours  collagenase Ointment 1 Application(s) Topical daily  dextrose 5%. 1000 milliLiter(s) IV Continuous <Continuous>  dextrose 5%. 1000 milliLiter(s) IV Continuous <Continuous>  dextrose 50% Injectable 25 Gram(s) IV Push once  dextrose 50% Injectable 12.5 Gram(s) IV Push once  dextrose 50% Injectable 25 Gram(s) IV Push once  dextrose Oral Gel 15 Gram(s) Oral once PRN  folic acid 1 milliGRAM(s) Oral daily  glucagon  Injectable 1 milliGRAM(s) IntraMuscular once  heparin   Injectable 5000 Unit(s) SubCutaneous every 8 hours  insulin glargine Injectable (LANTUS) 8 Unit(s) SubCutaneous every morning  insulin lispro (ADMELOG) corrective regimen sliding scale   SubCutaneous every 6 hours  lactobacillus acidophilus 1 Tablet(s) Oral daily  LORazepam     Tablet 2 milliGRAM(s) Oral every 4 hours  LORazepam   Injectable 2 milliGRAM(s) IV Push every 4 hours PRN  methocarbamol 500 milliGRAM(s) Oral two times a day  multivitamin 1 Tablet(s) Oral daily  nystatin Powder 1 Application(s) Topical every 12 hours  pantoprazole  Injectable 40 milliGRAM(s) IV Push daily  piperacillin/tazobactam IVPB.. 3.375 Gram(s) IV Intermittent every 8 hours  povidone iodine 10% Solution 1 Application(s) Topical daily  senna 3 Tablet(s) Oral at bedtime  simethicone 80 milliGRAM(s) Chew every 6 hours      Review of Systems:  unable to obtain    Allergies: codeine (Hives)    For details regarding the patient's past medical history, social history, family history, and other miscellaneous elements, please refer the initial infectious diseases consultation and/or the admitting history and physical examination for this admission.    Objective:  Vitals:   T(C): 37.1 (06-06-22 @ 04:05), Max: 37.2 (06-05-22 @ 14:00)  HR: 57 (06-06-22 @ 08:00) (53 - 76)  BP: 105/52 (06-06-22 @ 08:00) (105/52 - 135/61)  RR: 24 (06-06-22 @ 08:00) (21 - 32)  SpO2: 100% (06-06-22 @ 08:00) (99% - 100%)    Mode: AC/ CMV (Assist Control/ Continuous Mandatory Ventilation)  RR (machine): 24  TV (machine): 400  FiO2: 40  PEEP: 5  ITime: 0.9  MAP: 14  PIP: 28      Physical Examination:  General: no acute distress  HEENT: NC/AT, EOMI  Neck: trach to vent  Cardio: S1, S2 heard, RRR, no murmurs  Resp: MV breath sounds  Abd: soft, NT, ND, PGT in place  Ext: no edema or cyanosis  Skin: warm, dry, no visible rash      Laboratory Studies:  CBC:                       10.4   4.98  )-----------( 252      ( 06 Jun 2022 06:40 )             35.5     CMP: 06-06    142  |  107  |  40<H>  ----------------------------<  149<H>  5.1   |  26  |  1.14    Ca    9.2      06 Jun 2022 06:40    TPro  6.8  /  Alb  2.1<L>  /  TBili  0.6  /  DBili  x   /  AST  16  /  ALT  18  /  AlkPhos  122<H>  06-06    LIVER FUNCTIONS - ( 06 Jun 2022 06:40 )  Alb: 2.1 g/dL / Pro: 6.8 g/dL / ALK PHOS: 122 U/L / ALT: 18 U/L DA / AST: 16 U/L / GGT: x               Microbiology: reviewed    Culture - Urine (collected 06-03-22 @ 21:53)  Source: Catheterized Catheterized  Final Report (06-05-22 @ 17:10):    <10,000 CFU/mL Normal Urogenital Elvira    Culture - Blood (collected 06-03-22 @ 18:12)  Source: .Blood Blood-Peripheral  Preliminary Report (06-05-22 @ 01:01):    No growth to date.    Culture - Blood (collected 06-03-22 @ 18:12)  Source: .Blood Blood-Peripheral  Preliminary Report (06-05-22 @ 01:01):    No growth to date.    Culture - Sputum (collected 06-02-22 @ 14:57)  Source: Trach Asp Tracheal Aspirate  Gram Stain (06-02-22 @ 23:35):    Rare polymorphonuclear leukocytes per low power field    Rare Squamous epithelial cells per low power field    Moderate Gram Negative Rods per oil power field    Moderate Gram Negative Coccobacilli per oil power field  Final Report (06-05-22 @ 17:27):    Numerous Serratia marcescens    Moderate Pseudomonas aeruginosa (Carbapenem Resistant)    Normal Respiratory Elvira present  Organism: Serratia marcescens  Pseudomonas aeruginosa (Carbapenem Resistant) (06-05-22 @ 17:27)  Organism: Pseudomonas aeruginosa (Carbapenem Resistant) (06-05-22 @ 17:27)      -  Amikacin: S <=16      -  Aztreonam: S <=4      -  Cefepime: S 4      -  Ceftazidime: S 4      -  Ciprofloxacin: S <=0.25      -  Gentamicin: S <=2      -  Imipenem: R >8      -  Levofloxacin: S <=0.5      -  Meropenem: R 8      -  Piperacillin/Tazobactam: S <=8      -  Tobramycin: S <=2      Method Type: PRATIK  Organism: Serratia marcescens (06-05-22 @ 17:27)      -  Amikacin: S <=16      -  Amoxicillin/Clavulanic Acid: R >16/8      -  Ampicillin: R >16 These ampicillin results predict results for amoxicillin      -  Ampicillin/Sulbactam: R >16/8 Enterobacter, Klebsiella aerogenes, Citrobacter, and Serratia may develop resistance during prolonged therapy (3-4 days)      -  Aztreonam: R >16      -  Cefazolin: R >16 Enterobacter, Klebsiella aerogenes, Citrobacter, and Serratia may develop resistance during prolonged therapy (3-4 days)      -  Cefepime: R >16      -  Cefoxitin: R >16      -  Ceftriaxone: R >32 Enterobacter, Klebsiella aerogenes, Citrobacter, and Serratia may develop resistance during prolonged therapy      -  Ciprofloxacin: R >2      -  Ertapenem: S <=0.5      -  Gentamicin: S <=2      -  Levofloxacin: R 2      -  Meropenem: S <=1      -  Piperacillin/Tazobactam: I 32      -  Tobramycin: S 4      -  Trimethoprim/Sulfamethoxazole: S <=0.5/9.5      Method Type: PRATIK    Culture - Fungal, Body Fluid (collected 05-25-22 @ 10:39)  Source: .Body Fluid Pleural Fluid  Preliminary Report (06-04-22 @ 15:02):    No growth    Culture - Body Fluid with Gram Stain (collected 05-25-22 @ 10:39)  Source: .Body Fluid Pleural Fluid  Gram Stain (05-25-22 @ 19:50):    polymorphonuclear leukocytes seen    No organisms seen    by cytocentrifuge  Final Report (05-30-22 @ 09:04):    No growth    Culture - Acid Fast - Body Fluid w/Smear (collected 05-25-22 @ 10:39)  Source: .Body Fluid Pleural Fluid  Preliminary Report (06-04-22 @ 15:04):    No growth at 1 week.    Culture - Sputum (collected 05-23-22 @ 11:20)  Source: Trach Asp Tracheal Aspirate  Gram Stain (05-23-22 @ 21:29):    Moderate polymorphonuclear leukocytes per low power field    No Squamous epithelial cells per low power field    Moderate Gram Negative Rods per oil power field  Final Report (05-25-22 @ 16:10):    Numerous Mixed gram negative rods including    Numerous Pseudomonas aeruginosa (Carbapenem Resistant)    Normal Respiratory Elvira absent  Organism: Pseudomonas aeruginosa (Carbapenem Resistant) (05-25-22 @ 16:10)  Organism: Pseudomonas aeruginosa (Carbapenem Resistant) (05-25-22 @ 16:10)      -  Amikacin: S <=16      -  Aztreonam: S <=4      -  Cefepime: S 4      -  Ceftazidime: S 8      -  Ciprofloxacin: S <=0.25      -  Gentamicin: S <=2      -  Imipenem: R >8      -  Levofloxacin: S <=0.5      -  Meropenem: R 8      -  Piperacillin/Tazobactam: S 16      -  Tobramycin: S <=2      Method Type: PRATIK        Radiology: reviewed

## 2022-06-06 NOTE — PROGRESS NOTE ADULT - SUBJECTIVE AND OBJECTIVE BOX
Date/Time Patient Seen:  		  Referring MD:   Data Reviewed	       Patient is a 76y old  Female who presents with a chief complaint of Respiratory distress. (05 Jun 2022 20:42)      Subjective/HPI     PAST MEDICAL & SURGICAL HISTORY:  Dementia of frontal lobe type    Aphasic stroke    Diabetes mellitus    Respiratory failure    Hypertension    GERD (gastroesophageal reflux disease)    Constipation    Respiratory failure    CVA (cerebral vascular accident)    HTN (hypertension)    DM (diabetes mellitus)    Advanced dementia    COVID-19 virus detected    Quadriplegia    Pneumonia    Type II diabetes mellitus    Hx of appendectomy    Gastrostomy in place    Tracheostomy in place    Tracheostomy tube present    Feeding by G-tube          Medication list         MEDICATIONS  (STANDING):  chlorhexidine 0.12% Liquid 15 milliLiter(s) Oral Mucosa every 12 hours  collagenase Ointment 1 Application(s) Topical daily  dextrose 5%. 1000 milliLiter(s) (50 mL/Hr) IV Continuous <Continuous>  dextrose 5%. 1000 milliLiter(s) (100 mL/Hr) IV Continuous <Continuous>  dextrose 50% Injectable 25 Gram(s) IV Push once  dextrose 50% Injectable 12.5 Gram(s) IV Push once  dextrose 50% Injectable 25 Gram(s) IV Push once  folic acid 1 milliGRAM(s) Oral daily  glucagon  Injectable 1 milliGRAM(s) IntraMuscular once  heparin   Injectable 5000 Unit(s) SubCutaneous every 8 hours  insulin glargine Injectable (LANTUS) 8 Unit(s) SubCutaneous every morning  insulin lispro (ADMELOG) corrective regimen sliding scale   SubCutaneous every 6 hours  lactobacillus acidophilus 1 Tablet(s) Oral daily  LORazepam     Tablet 2 milliGRAM(s) Oral every 4 hours  methocarbamol 500 milliGRAM(s) Oral two times a day  multivitamin 1 Tablet(s) Oral daily  nystatin Powder 1 Application(s) Topical every 12 hours  pantoprazole  Injectable 40 milliGRAM(s) IV Push daily  piperacillin/tazobactam IVPB.. 3.375 Gram(s) IV Intermittent every 8 hours  povidone iodine 10% Solution 1 Application(s) Topical daily  senna 3 Tablet(s) Oral at bedtime  simethicone 80 milliGRAM(s) Chew every 6 hours    MEDICATIONS  (PRN):  acetaminophen    Suspension .. 650 milliGRAM(s) Oral every 6 hours PRN Temp greater or equal to 38C (100.4F), Mild Pain (1 - 3)  albuterol/ipratropium for Nebulization 3 milliLiter(s) Nebulizer every 6 hours PRN Shortness of Breath and/or Wheezing  dextrose Oral Gel 15 Gram(s) Oral once PRN Blood Glucose LESS THAN 70 milliGRAM(s)/deciliter  LORazepam   Injectable 2 milliGRAM(s) IV Push every 4 hours PRN Agitation         Vitals log        ICU Vital Signs Last 24 Hrs  T(C): 37.1 (06 Jun 2022 04:05), Max: 37.2 (05 Jun 2022 14:00)  T(F): 98.7 (06 Jun 2022 04:05), Max: 98.9 (05 Jun 2022 14:00)  HR: 55 (06 Jun 2022 06:00) (53 - 76)  BP: 115/56 (06 Jun 2022 06:00) (105/52 - 135/61)  BP(mean): 75 (06 Jun 2022 06:00) (69 - 84)  ABP: --  ABP(mean): --  RR: 21 (06 Jun 2022 06:00) (21 - 32)  SpO2: 100% (06 Jun 2022 06:00) (99% - 100%)       Mode: AC/ CMV (Assist Control/ Continuous Mandatory Ventilation)  RR (machine): 24  TV (machine): 400  FiO2: 40  PEEP: 5  ITime: 0.9  MAP: 13  PIP: 27      Input and Output:  I&O's Detail    04 Jun 2022 07:01  -  05 Jun 2022 07:00  --------------------------------------------------------  IN:    Glucerna 1.5: 1320 mL    IV PiggyBack: 100 mL  Total IN: 1420 mL    OUT:    Incontinent per Collection Bag (mL): 700 mL  Total OUT: 700 mL    Total NET: 720 mL      05 Jun 2022 07:01  -  06 Jun 2022 06:28  --------------------------------------------------------  IN:    Free Water: 500 mL    Glucerna 1.5: 1440 mL    IV PiggyBack: 300 mL  Total IN: 2240 mL    OUT:    Voided (mL): 800 mL  Total OUT: 800 mL    Total NET: 1440 mL          Lab Data                        11.3   5.44  )-----------( 165      ( 05 Jun 2022 06:37 )             39.7     06-05    143  |  108  |  47<H>  ----------------------------<  202<H>  5.3   |  29  |  1.22    Ca    9.2      05 Jun 2022 06:37    TPro  7.7  /  Alb  2.1<L>  /  TBili  0.5  /  DBili  x   /  AST  16  /  ALT  13  /  AlkPhos  127<H>  06-05            Review of Systems	      Objective     Physical Examination    heart s1s2  lung dc BS  abd soft      Pertinent Lab findings & Imaging      Annalise:  NO   Adequate UO     I&O's Detail    04 Jun 2022 07:01  -  05 Jun 2022 07:00  --------------------------------------------------------  IN:    Glucerna 1.5: 1320 mL    IV PiggyBack: 100 mL  Total IN: 1420 mL    OUT:    Incontinent per Collection Bag (mL): 700 mL  Total OUT: 700 mL    Total NET: 720 mL      05 Jun 2022 07:01  -  06 Jun 2022 06:28  --------------------------------------------------------  IN:    Free Water: 500 mL    Glucerna 1.5: 1440 mL    IV PiggyBack: 300 mL  Total IN: 2240 mL    OUT:    Voided (mL): 800 mL  Total OUT: 800 mL    Total NET: 1440 mL               Discussed with:     Cultures:	        Radiology

## 2022-06-06 NOTE — PROGRESS NOTE ADULT - SUBJECTIVE AND OBJECTIVE BOX
INTERVAL HPI/OVERNIGHT EVENTS:  No new overnight event.  No N/V/D.  Tolerating diet.    Allergies    codeine (Hives)    Intolerances    General:  No wt loss, fevers, chills, night sweats, fatigue,   Eyes:  Good vision, no reported pain  ENT:  No sore throat, pain, runny nose, dysphagia  CV:  No pain, palpitations, hypo/hypertension  Resp:  No dyspnea, cough, tachypnea, wheezing  GI:  No pain, No nausea, No vomiting, No diarrhea, No constipation, No weight loss, No fever, No pruritis, No rectal bleeding, No tarry stools, No dysphagia,  :  No pain, bleeding, incontinence, nocturia  Muscle:  No pain, weakness  Neuro:  No weakness, tingling, memory problems  Psych:  No fatigue, insomnia, mood problems, depression  Endocrine:  No polyuria, polydipsia, cold/heat intolerance  Heme:  No petechiae, ecchymosis, easy bruisability  Skin:  No rash, tattoos, scars, edema      PHYSICAL EXAM:   Vital Signs:  Vital Signs Last 24 Hrs  T(C): 36.6 (2022 08:15), Max: 37.2 (2022 14:00)  T(F): 97.9 (2022 08:15), Max: 98.9 (2022 14:00)  HR: 68 (2022 10:41) (53 - 76)  BP: 105/52 (2022 08:00) (105/52 - 135/61)  BP(mean): 69 (2022 08:00) (69 - 84)  RR: 24 (2022 08:00) (21 - 32)  SpO2: 100% (2022 10:41) (99% - 100%)  Daily     Daily Weight in k.3 (2022 04:05)I&O's Summary    2022 07:01  -  2022 07:00  --------------------------------------------------------  IN: 2240 mL / OUT: 800 mL / NET: 1440 mL        GENERAL:  Appears stated age, well-groomed, well-nourished, no distress  HEENT:  NC/AT,  conjunctivae clear and pink, no thyromegaly, nodules, adenopathy, no JVD, sclera -anicteric  CHEST:  Full & symmetric excursion, no increased effort, breath sounds clear  HEART:  Regular rhythm, S1, S2, no murmur/rub/S3/S4, no abdominal bruit, no edema  ABDOMEN:  Soft, non-tender, non-distended, normoactive bowel sounds,  no masses ,no hepato-splenomegaly, no signs of chronic liver disease  EXTEREMITIES:  no cyanosis,clubbing or edema  SKIN:  No rash/erythema/ecchymoses/petechiae/wounds/abscess/warm/dry  NEURO:  Alert, oriented, no asterixis, no tremor, no encephalopathy      LABS:                        10.4   4.98  )-----------( 252      ( 2022 06:40 )             35.5     06-06    142  |  107  |  40<H>  ----------------------------<  149<H>  5.1   |  26  |  1.14    Ca    9.2      2022 06:40    TPro  6.8  /  Alb  2.1<L>  /  TBili  0.6  /  DBili  x   /  AST  16  /  ALT  18  /  AlkPhos  122<H>  06-06        amylase   lipase  RADIOLOGY & ADDITIONAL TESTS:

## 2022-06-06 NOTE — CONSULT NOTE ADULT - SUBJECTIVE AND OBJECTIVE BOX
Chief Complaint:  Patient is a 76y old  Female who presents with a chief complaint of Respiratory distress.   This is an 78 y/o F with PMH of HTN, DM type 2, Cardiac Arrest, Core Pulmonale, CVA, COPD, Chronic Respiratory Failure Vent Dependant s/p trach & PEG, COVID-19 Infection, CKD, Anemia, GERD, and Dementia who was sent from [facility] facility for acute respiratory distress. Patient was discharged this afternoon from [facility] after 10 days admission for acute on chronic respiratory failure 2ry to [facility] with parapneumonic pleural effusion, had thoracentesis on May 25th, and completed a course of Avycaz 2 days prior to discharge, then monitored off antibiotics till discharge without spiking any fever. Patient is non communicating, no further history could be obtained at this time.     Review of Systems:  Review of Systems: -    Unable to obtain as stated above.    Allergies:  codeine (Hives)      Medications:  acetaminophen    Suspension .. 650 milliGRAM(s) Oral every 6 hours PRN  albuterol/ipratropium for Nebulization 3 milliLiter(s) Nebulizer every 6 hours PRN  chlorhexidine 0.12% Liquid 15 milliLiter(s) Oral Mucosa every 12 hours  collagenase Ointment 1 Application(s) Topical daily  dextrose 5%. 1000 milliLiter(s) IV Continuous <Continuous>  dextrose 5%. 1000 milliLiter(s) IV Continuous <Continuous>  dextrose 50% Injectable 25 Gram(s) IV Push once  dextrose 50% Injectable 12.5 Gram(s) IV Push once  dextrose 50% Injectable 25 Gram(s) IV Push once  dextrose Oral Gel 15 Gram(s) Oral once PRN  folic acid 1 milliGRAM(s) Oral daily  furosemide   Injectable 40 milliGRAM(s) IV Push daily  glucagon  Injectable 1 milliGRAM(s) IntraMuscular once  heparin   Injectable 5000 Unit(s) SubCutaneous every 8 hours  insulin glargine Injectable (LANTUS) 8 Unit(s) SubCutaneous every morning  insulin lispro (ADMELOG) corrective regimen sliding scale   SubCutaneous every 6 hours  lactobacillus acidophilus 1 Tablet(s) Oral daily  LORazepam     Tablet 2 milliGRAM(s) Oral every 4 hours  LORazepam   Injectable 2 milliGRAM(s) IV Push every 4 hours PRN  methocarbamol 500 milliGRAM(s) Oral two times a day  multivitamin 1 Tablet(s) Oral daily  nystatin Powder 1 Application(s) Topical every 12 hours  pantoprazole  Injectable 40 milliGRAM(s) IV Push daily  piperacillin/tazobactam IVPB.. 3.375 Gram(s) IV Intermittent every 8 hours  povidone iodine 10% Solution 1 Application(s) Topical daily  senna 3 Tablet(s) Oral at bedtime  simethicone 80 milliGRAM(s) Chew every 6 hours      PMHX/PSHX:  Dementia of frontal lobe type    Aphasic stroke    Diabetes mellitus    Respiratory failure    Hypertension    GERD (gastroesophageal reflux disease)    Constipation    Respiratory failure    CVA (cerebral vascular accident)    HTN (hypertension)    DM (diabetes mellitus)    Advanced dementia    COVID-19 virus detected    Quadriplegia    Pneumonia    Type II diabetes mellitus    Hx of appendectomy    Gastrostomy in place    Tracheostomy in place    Tracheostomy tube present    Feeding by G-tube        Family history:  No pertinent family history in first degree relatives    No pertinent family history in first degree relatives    No pertinent family history in first degree relatives    No pertinent family history in first degree relatives        Social History:   no etoh no cigs  no ivda no prbc    ROS:     General:  No wt loss, fevers, chills, night sweats, fatigue,   Eyes:  Good vision, no reported pain  ENT:  No sore throat, pain, runny nose, dysphagia  CV:  No pain, palpitations, hypo/hypertension  Resp:  No dyspnea, cough, tachypnea, wheezing  GI:  No pain, No nausea, No vomiting, No diarrhea, No constipation, No weight loss, No fever, No pruritis, No rectal bleeding, No tarry stools, No dysphagia,  :  No pain, bleeding, incontinence, nocturia  Muscle:  No pain, weakness  Neuro:  No weakness, tingling, memory problems  Psych:  No fatigue, insomnia, mood problems, depression  Endocrine:  No polyuria, polydipsia, cold/heat intolerance  Heme:  No petechiae, ecchymosis, easy bruisability  Skin:  No rash, tattoos, scars, edema      PHYSICAL EXAM:   Vital Signs:  Vital Signs Last 24 Hrs  T(C): 38.3 (2022 15:38), Max: 38.4 (2022 12:39)  T(F): 101 (2022 15:38), Max: 101.1 (2022 12:39)  HR: 70 (2022 18:00) (70 - 91)  BP: 107/48 (2022 18:00) (93/52 - 140/74)  BP(mean): 66 (2022 18:00) (60 - 95)  RR: 21 (2022 18:00) (21 - 24)  SpO2: 96% (2022 18:00) (89% - 100%)  Daily     Daily Weight in k (2022 07:47)    GENERAL:  Appears stated age, well-groomed, well-nourished, no distress  HEENT:  NC/AT,  conjunctivae clear and pink, no thyromegaly, nodules, adenopathy, no JVD, sclera -anicteric  CHEST:  Full & symmetric excursion, no increased effort, breath sounds clear  HEART:  Regular rhythm, S1, S2, no murmur/rub/S3/S4, no abdominal bruit, no edema  ABDOMEN:  Soft, non-tender, non-distended, normoactive bowel sounds,  no masses ,no hepato-splenomegaly, no signs of chronic liver disease  EXTEREMITIES:  no cyanosis,clubbing or edema  SKIN:  No rash/erythema/ecchymoses/petechiae/wounds/abscess/warm/dry  NEURO:  Alert, oriented, no asterixis, no tremor, no encephalopathy    LABS:                        11.3   11.99 )-----------( 259      ( 2022 06:06 )             38.1     06-03    144  |  108  |  39<H>  ----------------------------<  189<H>  5.1   |  29  |  1.27    Ca    8.9      2022 06:06  Phos  3.9     06-03  Mg     2.7     06-02    TPro  7.8  /  Alb  2.2<L>  /  TBili  0.7  /  DBili  x   /  AST  12  /  ALT  16  /  AlkPhos  145<H>  06-03    LIVER FUNCTIONS - ( 2022 06:06 )  Alb: 2.2 g/dL / Pro: 7.8 g/dL / ALK PHOS: 145 U/L / ALT: 16 U/L DA / AST: 12 U/L / GGT: x           PT/INR - ( 2022 06:06 )   PT: 14.8 sec;   INR: 1.28 ratio                 Imaging:          
  HPI:  This is an 78 y/o F with PMH of HTN, DM type 2, Cardiac Arrest, Core Pulmonale, CVA, COPD, Chronic Respiratory Failure Vent Dependant s/p trach & PEG, COVID-19 Infection, CKD, Anemia, GERD, and Dementia who was sent from [facility] facility for acute respiratory distress. Patient was discharged June 1st from [facility] and returned a few hours later after 10 days admission for acute on chronic respiratory failure 2ry to [facility] with parapneumonic pleural effusion, had thoracentesis on May 25th, and completed a course of Avycaz 2 days prior to discharge, then monitored off antibiotics till discharge without spiking any fever. Patient is non communicating, no further history could be obtained at this time.        PAST MEDICAL & SURGICAL HISTORY:  Dementia of frontal lobe type      Aphasic stroke      Diabetes mellitus      Respiratory failure      Hypertension      GERD (gastroesophageal reflux disease)      Constipation      Respiratory failure      CVA (cerebral vascular accident)      HTN (hypertension)      DM (diabetes mellitus)      Advanced dementia      COVID-19 virus detected      Quadriplegia      Pneumonia      Type II diabetes mellitus      Hx of appendectomy      Gastrostomy in place      Tracheostomy in place      Tracheostomy tube present      Feeding by G-tube    REVIEW OF SYSTEMS  Unable to obtain ROS  2/2 non-verbal status    MEDICATIONS  (STANDING):  chlorhexidine 0.12% Liquid 15 milliLiter(s) Oral Mucosa every 12 hours  collagenase Ointment 1 Application(s) Topical daily  dextrose 5%. 1000 milliLiter(s) (50 mL/Hr) IV Continuous <Continuous>  dextrose 5%. 1000 milliLiter(s) (100 mL/Hr) IV Continuous <Continuous>  dextrose 50% Injectable 25 Gram(s) IV Push once  dextrose 50% Injectable 12.5 Gram(s) IV Push once  dextrose 50% Injectable 25 Gram(s) IV Push once  folic acid 1 milliGRAM(s) Oral daily  furosemide   Injectable 40 milliGRAM(s) IV Push daily  glucagon  Injectable 1 milliGRAM(s) IntraMuscular once  heparin   Injectable 5000 Unit(s) SubCutaneous every 8 hours  insulin glargine Injectable (LANTUS) 8 Unit(s) SubCutaneous every morning  insulin lispro (ADMELOG) corrective regimen sliding scale   SubCutaneous every 6 hours  lactobacillus acidophilus 1 Tablet(s) Oral daily  LORazepam     Tablet 2 milliGRAM(s) Oral every 4 hours  methocarbamol 500 milliGRAM(s) Oral two times a day  multivitamin 1 Tablet(s) Oral daily  nystatin Powder 1 Application(s) Topical every 12 hours  pantoprazole  Injectable 40 milliGRAM(s) IV Push daily  povidone iodine 10% Solution 1 Application(s) Topical daily  senna 3 Tablet(s) Oral at bedtime  simethicone 80 milliGRAM(s) Chew every 6 hours    MEDICATIONS  (PRN):  acetaminophen    Suspension .. 650 milliGRAM(s) Oral every 6 hours PRN Mild Pain (1 - 3), Moderate Pain (4 - 6)  albuterol/ipratropium for Nebulization 3 milliLiter(s) Nebulizer every 6 hours PRN Shortness of Breath and/or Wheezing  dextrose Oral Gel 15 Gram(s) Oral once PRN Blood Glucose LESS THAN 70 milliGRAM(s)/deciliter  LORazepam   Injectable 2 milliGRAM(s) IV Push every 4 hours PRN Agitation        Allergies    codeine (Hives)    Intolerances        SOCIAL HISTORY:      FAMILY HISTORY:  No pertinent family history in first degree relatives      Vital Signs Last 24 Hrs  T(C): 38.3 (03 Jun 2022 08:28), Max: 38.3 (03 Jun 2022 08:28)  T(F): 101 (03 Jun 2022 08:28), Max: 101 (03 Jun 2022 08:28)  HR: 87 (03 Jun 2022 08:00) (77 - 92)  BP: 97/52 (03 Jun 2022 08:00) (97/52 - 141/93)  BP(mean): 67 (03 Jun 2022 08:00) (67 - 106)  RR: 24 (03 Jun 2022 08:00) (19 - 47)  SpO2: 95% (03 Jun 2022 08:00) (89% - 100%)                                        11.3   11.99 )-----------( 259      ( 03 Jun 2022 06:06 )             38.1   06-03    144  |  108  |  39<H>  ----------------------------<  189<H>  5.1   |  29  |  1.27    Ca    8.9      03 Jun 2022 06:06  Phos  3.9     06-03  Mg     2.7     06-02    TPro  7.8  /  Alb  2.2<L>  /  TBili  0.7  /  DBili  x   /  AST  12  /  ALT  16  /  AlkPhos  145<H>  06-03        A1C with Estimated Average Glucose Result: 6.4 % (04-22-22 @ 12:14)      PHYSICAL EXAM:    General: resting comfortably in bed; NAD  ENT: no nasal discharge;  Neck: trach in place/vented  Extremities: Bilateral drop foot: necrotic ulcerations bilateral feet  Neurologic:  Can not follow commands,      
    BREA BECKHAM    [facility]    Allergies    codeine (Hives)    Intolerances        PAST MEDICAL & SURGICAL HISTORY:  Dementia of frontal lobe type      Aphasic stroke      Diabetes mellitus      Respiratory failure      Hypertension      GERD (gastroesophageal reflux disease)      Constipation      Respiratory failure      CVA (cerebral vascular accident)      HTN (hypertension)      DM (diabetes mellitus)      Advanced dementia      COVID-19 virus detected      Quadriplegia      Pneumonia      Type II diabetes mellitus      Hx of appendectomy      Gastrostomy in place      Tracheostomy in place      Tracheostomy tube present      Feeding by G-tube          FAMILY HISTORY:  No pertinent family history in first degree relatives        Home Medications:  albuterol 90 mcg/inh inhalation aerosol with adapter: 2  inhaled every 6 hours (21 May 2022 21:16)  Bacid (LAC) oral tablet: 2 tab(s) by gastrostomy tube once a day (21 May 2022 21:16)  Betadine 10% topical swab: cleanse right and left great toes (21 May 2022 21:16)  Carafate 1 g/10 mL oral suspension: 10 milliliter(s) by gastrostomy tube 4 times a day (before meals and at bedtime) for 14 days (Started 6/4/21) (21 May 2022 21:16)  chlorhexidine 0.12% mucous membrane liquid: 15 milliliter(s) mucous membrane 2 times a day (21 May 2022 21:16)  Eucerin topical cream: Apply topically to affected area once a day bilateral feet (21 May 2022 21:16)  folic acid 1 mg oral tablet: 1 tab(s) orally once a day (21 May 2022 21:16)  insulin glargine 100 units/mL subcutaneous solution: 8 unit(s) subcutaneous once a day (in the morning) (01 Jun 2022 08:24)  ipratropium-albuterol 0.5 mg-2.5 mg/3 mL inhalation solution: 3 milliliter(s) inhaled 4 times a day (21 May 2022 21:16)  LORazepam 1 mg oral tablet: 1 tab(s) by gastrostomy tube every 4 hours (21 May 2022 21:16)  methocarbamol 500 mg oral tablet: 1 tab(s) by gastrostomy tube 2 times a day (21 May 2022 21:16)  MiraLax oral powder for reconstitution:  (21 May 2022 23:14)  Multiple Vitamins oral tablet: 1 tab(s) orally once a day (21 May 2022 21:16)  nystatin 100,000 units/g topical powder: 1 application topically 3 times a day (29 May 2022 16:35)  omeprazole 20 mg oral delayed release capsule: orally 2 times a day (21 May 2022 23:14)  polyethylene glycol 3350 oral powder for reconstitution: 17 gram(s) by gastrostomy tube every 12 hours (21 May 2022 21:16)  senna 8.6 mg oral tablet: 3 tab(s) by gastrostomy tube once a day (at bedtime) (21 May 2022 21:16)  simethicone 80 mg oral tablet, chewable: 1 tab(s) by gastrostomy tube every 6 hours (21 May 2022 21:16)  Tylenol 325 mg oral tablet: 2 tab(s) by gastrostomy tube once a day; 60 minutes prior to dressing change  (21 May 2022 21:16)  Tylenol 325 mg oral tablet: 2 tab(s) by gastrostomy tube every 6 hours, As Needed (21 May 2022 21:16)      MEDICATIONS  (STANDING):  chlorhexidine 0.12% Liquid 15 milliLiter(s) Oral Mucosa every 12 hours  collagenase Ointment 1 Application(s) Topical daily  dextrose 5%. 1000 milliLiter(s) (50 mL/Hr) IV Continuous <Continuous>  dextrose 5%. 1000 milliLiter(s) (100 mL/Hr) IV Continuous <Continuous>  dextrose 50% Injectable 25 Gram(s) IV Push once  dextrose 50% Injectable 12.5 Gram(s) IV Push once  dextrose 50% Injectable 25 Gram(s) IV Push once  folic acid 1 milliGRAM(s) Oral daily  furosemide   Injectable 40 milliGRAM(s) IV Push daily  glucagon  Injectable 1 milliGRAM(s) IntraMuscular once  heparin   Injectable 5000 Unit(s) SubCutaneous every 8 hours  insulin glargine Injectable (LANTUS) 8 Unit(s) SubCutaneous every morning  insulin lispro (ADMELOG) corrective regimen sliding scale   SubCutaneous every 6 hours  lactobacillus acidophilus 1 Tablet(s) Oral daily  LORazepam     Tablet 2 milliGRAM(s) Oral every 4 hours  methocarbamol 500 milliGRAM(s) Oral two times a day  multivitamin 1 Tablet(s) Oral daily  nystatin Powder 1 Application(s) Topical every 12 hours  pantoprazole  Injectable 40 milliGRAM(s) IV Push daily  povidone iodine 10% Solution 1 Application(s) Topical daily  senna 3 Tablet(s) Oral at bedtime  simethicone 80 milliGRAM(s) Chew every 6 hours    MEDICATIONS  (PRN):  acetaminophen    Suspension .. 650 milliGRAM(s) Oral every 6 hours PRN Mild Pain (1 - 3), Moderate Pain (4 - 6)  albuterol/ipratropium for Nebulization 3 milliLiter(s) Nebulizer every 6 hours PRN Shortness of Breath and/or Wheezing  dextrose Oral Gel 15 Gram(s) Oral once PRN Blood Glucose LESS THAN 70 milliGRAM(s)/deciliter  LORazepam   Injectable 2 milliGRAM(s) IV Push every 4 hours PRN Agitation      Diet, NPO with Tube Feed:   Tube Feeding Modality: Gastrostomy  Glucerna 1.5 Horacio  Total Volume for 24 Hours (mL): 1440  Continuous  Starting Tube Feed Rate mL per Hour: 60  Until Goal Tube Feed Rate (mL per Hour): 60  Tube Feed Duration (in Hours): 24  Tube Feed Start Time: 00:00 (06-02-22 @ 10:31) [Active]          Vital Signs Last 24 Hrs  T(C): 36.6 (02 Jun 2022 08:50), Max: 36.9 (01 Jun 2022 12:00)  T(F): 97.9 (02 Jun 2022 08:50), Max: 98.4 (01 Jun 2022 12:00)  HR: 86 (02 Jun 2022 10:29) (60 - 86)  BP: 122/65 (02 Jun 2022 08:00) (101/61 - 135/78)  BP(mean): 82 (02 Jun 2022 08:00) (70 - 90)  RR: 32 (02 Jun 2022 08:00) (18 - 32)  SpO2: 96% (02 Jun 2022 10:29) (90% - 100%)      06-01-22 @ 07:01  -  06-02-22 @ 07:00  --------------------------------------------------------  IN: 420 mL / OUT: 0 mL / NET: 420 mL        Mode: AC/ CMV (Assist Control/ Continuous Mandatory Ventilation), RR (machine): 24, TV (machine): 400, FiO2: 40, PEEP: 5, ITime: 0.9, MAP: 22, PIP: 39      LABS:                        10.6   7.59  )-----------( 220      ( 02 Jun 2022 06:44 )             35.7     06-02    145  |  110<H>  |  37<H>  ----------------------------<  205<H>  4.9   |  27  |  1.15    Ca    8.7      02 Jun 2022 06:44  Phos  5.2     06-02  Mg     2.7     06-02    TPro  7.6  /  Alb  2.2<L>  /  TBili  0.6  /  DBili  x   /  AST  17  /  ALT  18  /  AlkPhos  139<H>  06-01          ABG - ( 01 Jun 2022 22:14 )  pH, Arterial: 7.47  pH, Blood: x     /  pCO2: 36    /  pO2: 261   / HCO3: 26    / Base Excess: 2.5   /  SaO2: 100.0               WBC:  WBC Count: 7.59 K/uL (06-02 @ 06:44)  WBC Count: 9.74 K/uL (06-01 @ 22:18)  WBC Count: 7.53 K/uL (06-01 @ 06:34)  WBC Count: 7.50 K/uL (05-31 @ 06:19)  WBC Count: 9.68 K/uL (05-30 @ 06:28)      MICROBIOLOGY:  RECENT CULTURES:                  Sodium:  Sodium, Serum: 145 mmol/L (06-02 @ 06:44)  Sodium, Serum: 143 mmol/L (06-01 @ 22:18)  Sodium, Serum: 141 mmol/L (06-01 @ 06:34)  Sodium, Serum: 140 mmol/L (05-31 @ 06:19)  Sodium, Serum: 145 mmol/L (05-30 @ 06:28)      1.15 mg/dL 06-02 @ 06:44  1.13 mg/dL 06-01 @ 22:18  1.08 mg/dL 06-01 @ 06:34  1.07 mg/dL 05-31 @ 06:19  1.21 mg/dL 05-30 @ 06:28      Hemoglobin:  Hemoglobin: 10.6 g/dL (06-02 @ 06:44)  Hemoglobin: 10.2 g/dL (06-01 @ 22:18)  Hemoglobin: 8.8 g/dL (06-01 @ 06:34)  Hemoglobin: 7.6 g/dL (05-31 @ 06:19)  Hemoglobin: 7.7 g/dL (05-30 @ 16:31)  Hemoglobin: 7.5 g/dL (05-30 @ 06:28)      Platelets: Platelet Count - Automated: 220 K/uL (06-02 @ 06:44)  Platelet Count - Automated: 218 K/uL (06-01 @ 22:18)  Platelet Count - Automated: 197 K/uL (06-01 @ 06:34)  Platelet Count - Automated: 187 K/uL (05-31 @ 06:19)  Platelet Count - Automated: 198 K/uL (05-30 @ 06:28)      LIVER FUNCTIONS - ( 01 Jun 2022 22:18 )  Alb: 2.2 g/dL / Pro: 7.6 g/dL / ALK PHOS: 139 U/L / ALT: 18 U/L DA / AST: 17 U/L / GGT: x                 RADIOLOGY & ADDITIONAL STUDIES:      MICROBIOLOGY:  RECENT CULTURES:          
History of Present Illness: The patient is a 76 year old female with a history of HTN, DM, CVA, dementia, chronic respiratory failure s/p trach, PEG, cardiac arrest, anemia who presents with respiratory distress. The patient is unable to provide additional history. She was discharged yesterday after a hospital course where she was treated for PNA, septic shock, parapneumonic effusions.    Past Medical/Surgical History:  HTN, DM, CVA, dementia, chronic respiratory failure s/p trach, PEG, cardiac arrest, anemia    Medications:  Home Medications:  albuterol 90 mcg/inh inhalation aerosol with adapter: 2  inhaled every 6 hours (21 May 2022 21:16)  Bacid (LAC) oral tablet: 2 tab(s) by gastrostomy tube once a day (21 May 2022 21:16)  Betadine 10% topical swab: cleanse right and left great toes (21 May 2022 21:16)  Carafate 1 g/10 mL oral suspension: 10 milliliter(s) by gastrostomy tube 4 times a day (before meals and at bedtime) for 14 days (Started 6/4/21) (21 May 2022 21:16)  chlorhexidine 0.12% mucous membrane liquid: 15 milliliter(s) mucous membrane 2 times a day (21 May 2022 21:16)  Eucerin topical cream: Apply topically to affected area once a day bilateral feet (21 May 2022 21:16)  folic acid 1 mg oral tablet: 1 tab(s) orally once a day (21 May 2022 21:16)  insulin glargine 100 units/mL subcutaneous solution: 8 unit(s) subcutaneous once a day (in the morning) (01 Jun 2022 08:24)  ipratropium-albuterol 0.5 mg-2.5 mg/3 mL inhalation solution: 3 milliliter(s) inhaled 4 times a day (21 May 2022 21:16)  LORazepam 1 mg oral tablet: 1 tab(s) by gastrostomy tube every 4 hours (21 May 2022 21:16)  methocarbamol 500 mg oral tablet: 1 tab(s) by gastrostomy tube 2 times a day (21 May 2022 21:16)  MiraLax oral powder for reconstitution:  (21 May 2022 23:14)  Multiple Vitamins oral tablet: 1 tab(s) orally once a day (21 May 2022 21:16)  nystatin 100,000 units/g topical powder: 1 application topically 3 times a day (29 May 2022 16:35)  omeprazole 20 mg oral delayed release capsule: orally 2 times a day (21 May 2022 23:14)  polyethylene glycol 3350 oral powder for reconstitution: 17 gram(s) by gastrostomy tube every 12 hours (21 May 2022 21:16)  senna 8.6 mg oral tablet: 3 tab(s) by gastrostomy tube once a day (at bedtime) (21 May 2022 21:16)  simethicone 80 mg oral tablet, chewable: 1 tab(s) by gastrostomy tube every 6 hours (21 May 2022 21:16)  Tylenol 325 mg oral tablet: 2 tab(s) by gastrostomy tube once a day; 60 minutes prior to dressing change  (21 May 2022 21:16)  Tylenol 325 mg oral tablet: 2 tab(s) by gastrostomy tube every 6 hours, As Needed (21 May 2022 21:16)      Family History: Non-contributory family history of premature cardiovascular atherosclerotic disease    Social History: No tobacco, alcohol or drug use    Review of Systems:  General: No fevers, chills, weight gain  Skin: No rashes, color changes  Cardiovascular: No chest pain, orthopnea  Respiratory: No shortness of breath, cough  Gastrointestinal: No nausea, abdominal pain  Genitourinary: No incontinence, pain with urination  Musculoskeletal: No pain, swelling, decreased range of motion  Neurological: No headache, weakness  Psychiatric: No depression, anxiety  Endocrine: No weight gain, increased thirst  All other systems are comprehensively negative.    Physical Exam:  Vitals:        Vital Signs Last 24 Hrs  T(C): 36.5 (02 Jun 2022 03:55), Max: 36.9 (01 Jun 2022 12:00)  T(F): 97.7 (02 Jun 2022 03:55), Max: 98.4 (01 Jun 2022 12:00)  HR: 84 (02 Jun 2022 07:45) (60 - 85)  BP: 134/62 (02 Jun 2022 06:39) (101/61 - 135/78)  BP(mean): 83 (02 Jun 2022 06:39) (70 - 90)  RR: 29 (02 Jun 2022 06:39) (18 - 29)  SpO2: 99% (02 Jun 2022 07:45) (90% - 100%)  General: NAD  HEENT: Trach  Neck: No JVD, no carotid bruit  Lungs: Diminished breath sounds  CV: RRR, nl S1/S2, no M/R/G  Abdomen: S/NT/ND, +BS  Extremities: No LE edema, no cyanosis  Neuro: AAOx0  Skin: No rash    Labs:                        10.6   7.59  )-----------( 220      ( 02 Jun 2022 06:44 )             35.7     06-02    145  |  110<H>  |  37<H>  ----------------------------<  205<H>  4.9   |  27  |  1.15    Ca    8.7      02 Jun 2022 06:44  Phos  5.2     06-02  Mg     2.7     06-02    TPro  7.6  /  Alb  2.2<L>  /  TBili  0.6  /  DBili  x   /  AST  17  /  ALT  18  /  AlkPhos  139<H>  06-01            ECG: NSR, nonspecific ST abnormality    Telemetry: Sinus rhythm    
Patient is a 76y old  Female who presents with a chief complaint of     BRIEF HOSPITAL COURSE:   79F HTN, DM2, COPD, Chronic Respiratory Failure on Trach to Vent admitted just discharged earlier today at 2pm now back at 7pm for respiratory distress and tachypnea. Pt recently admitted on 5/21 -6/1 for chronic hypoxic resp failure. At nursing home when transferring vent to theri vent pt was hypoxic despite 100% fi02. Now sent to [facility]. in the Ed vitals normal, labs w/n/l. CXr with slight worsening of b/l infiltrates. MICU consulted           PAST MEDICAL & SURGICAL HISTORY:  Dementia of frontal lobe type      Aphasic stroke      Diabetes mellitus      Respiratory failure      Hypertension      GERD (gastroesophageal reflux disease)      Constipation      Respiratory failure      CVA (cerebral vascular accident)      HTN (hypertension)      DM (diabetes mellitus)      Advanced dementia      COVID-19 virus detected      Quadriplegia      Pneumonia      Type II diabetes mellitus      Hx of appendectomy      Gastrostomy in place      Tracheostomy in place      Tracheostomy tube present      Feeding by G-tube        Allergies    codeine (Hives)    Intolerances      FAMILY HISTORY:  No pertinent family history in first degree relatives        Review of Systems:  unable to assess 2.2 non verbal     Medications:                        chlorhexidine 0.12% Liquid 15 milliLiter(s) Oral Mucosa every 12 hours  chlorhexidine 2% Cloths 1 Application(s) Topical <User Schedule>        Mode: AC/ CMV (Assist Control/ Continuous Mandatory Ventilation)  RR (machine): 18  TV (machine): 400  FiO2: 100  PEEP: 5  MAP: 15  PIP: 45      ICU Vital Signs Last 24 Hrs  T(C): 36.8 (01 Jun 2022 20:16), Max: 37.1 (01 Jun 2022 03:42)  T(F): 98.3 (01 Jun 2022 20:16), Max: 98.7 (01 Jun 2022 03:42)  HR: 60 (01 Jun 2022 22:16) (60 - 81)  BP: 126/70 (01 Jun 2022 22:16) (108/57 - 130/65)  BP(mean): 84 (01 Jun 2022 12:00) (72 - 84)  ABP: --  ABP(mean): --  RR: 18 (01 Jun 2022 22:16) (18 - 26)  SpO2: 100% (01 Jun 2022 22:16) (97% - 100%)    Vital Signs Last 24 Hrs  T(C): 36.8 (01 Jun 2022 20:16), Max: 37.1 (01 Jun 2022 03:42)  T(F): 98.3 (01 Jun 2022 20:16), Max: 98.7 (01 Jun 2022 03:42)  HR: 60 (01 Jun 2022 22:16) (60 - 81)  BP: 126/70 (01 Jun 2022 22:16) (108/57 - 130/65)  BP(mean): 84 (01 Jun 2022 12:00) (72 - 84)  RR: 18 (01 Jun 2022 22:16) (18 - 26)  SpO2: 100% (01 Jun 2022 22:16) (97% - 100%)    ABG - ( 01 Jun 2022 22:14 )  pH, Arterial: 7.47  pH, Blood: x     /  pCO2: 36    /  pO2: 261   / HCO3: 26    / Base Excess: 2.5   /  SaO2: 100.0               I&O's Detail        LABS:                        10.2   9.74  )-----------( 218      ( 01 Jun 2022 22:18 )             34.7     06-01    143  |  111<H>  |  37<H>  ----------------------------<  184<H>  5.4<H>   |  25  |  1.13    Ca    9.3      01 Jun 2022 22:18  Phos  2.7     05-31  Mg     2.4     05-31    TPro  7.6  /  Alb  2.2<L>  /  TBili  0.6  /  DBili  x   /  AST  17  /  ALT  18  /  AlkPhos  139<H>  06-01          CAPILLARY BLOOD GLUCOSE      POCT Blood Glucose.: 196 mg/dL (01 Jun 2022 11:14)        CULTURES:      Physical Examination:    General: elderly female in bed, ill appearing on vent    HEENT: Pupils equal, reactive to light.  Symmetric.    PULM: b/l air entry     CVS: +s1/s2    ABD: Soft, mildly distended + peg tube     EXT: No eedema    SKIN: Warm     Neuro: awake, does not follow commands     RADIOLOGY:   chest xray with b/l chf     Critical Care time: 35 mins assessing presenting problems of acute illness that poses high probability of life threatening deterioration or end organ damage/dysfunction.  Medical decision making inculding Initiating plan of care, reviewing data, reviewing radiology,direct patient bedside evaluation and interpretation of vital signs, any necessary ventilator management , discusion with multidisciplinary team, discussing goals of care with patient/family, all non inclusive of procedures    
  Patient is a 76y old  Female who presents with a chief complaint of Respiratory distress. (06 Jun 2022 10:55)    HPI:  This is an 78 y/o F with PMH of HTN, DM type 2, Cardiac Arrest, Core Pulmonale, CVA, COPD, Chronic Respiratory Failure Vent Dependant s/p trach & PEG, COVID-19 Infection, CKD, Anemia, GERD, and Dementia who was sent from [facility] facility for acute respiratory distress. Patient was discharged this afternoon from [facility] after 10 days admission for acute on chronic respiratory failure 2ry to [facility] with parapneumonic pleural effusion, had thoracentesis on May 25th, and completed a course of Avycaz 2 days prior to discharge, then monitored off antibiotics till discharge without spiking any fever. Patient is non communicating, no further history could be obtained at this time. (01 Jun 2022 23:29)    Renal consult called for chronic kidney disease stage 3. History obtained from chart.       PAST MEDICAL HISTORY:  Dementia of frontal lobe type    Aphasic stroke    Diabetes mellitus    Respiratory failure    Hypertension    GERD (gastroesophageal reflux disease)    Constipation    Respiratory failure    CVA (cerebral vascular accident)    HTN (hypertension)    DM (diabetes mellitus)    Advanced dementia    COVID-19 virus detected    Quadriplegia    Pneumonia    Type II diabetes mellitus        PAST SURGICAL HISTORY:  Hx of appendectomy    Gastrostomy in place    Tracheostomy in place    Tracheostomy tube present    Feeding by G-tube        FAMILY HISTORY:  No pertinent family history in first degree relatives        SOCIAL HISTORY: No smoking or alcohol use     Allergies    codeine (Hives)    Intolerances      Home Medications:  albuterol 90 mcg/inh inhalation aerosol with adapter: 2  inhaled every 6 hours (21 May 2022 21:16)  Bacid (LAC) oral tablet: 2 tab(s) by gastrostomy tube once a day (21 May 2022 21:16)  Betadine 10% topical swab: cleanse right and left great toes (21 May 2022 21:16)  Carafate 1 g/10 mL oral suspension: 10 milliliter(s) by gastrostomy tube 4 times a day (before meals and at bedtime) for 14 days (Started 6/4/21) (21 May 2022 21:16)  chlorhexidine 0.12% mucous membrane liquid: 15 milliliter(s) mucous membrane 2 times a day (21 May 2022 21:16)  Eucerin topical cream: Apply topically to affected area once a day bilateral feet (21 May 2022 21:16)  folic acid 1 mg oral tablet: 1 tab(s) orally once a day (21 May 2022 21:16)  insulin glargine 100 units/mL subcutaneous solution: 8 unit(s) subcutaneous once a day (in the morning) (01 Jun 2022 08:24)  ipratropium-albuterol 0.5 mg-2.5 mg/3 mL inhalation solution: 3 milliliter(s) inhaled 4 times a day (21 May 2022 21:16)  LORazepam 1 mg oral tablet: 1 tab(s) by gastrostomy tube every 4 hours (21 May 2022 21:16)  methocarbamol 500 mg oral tablet: 1 tab(s) by gastrostomy tube 2 times a day (21 May 2022 21:16)  MiraLax oral powder for reconstitution:  (21 May 2022 23:14)  Multiple Vitamins oral tablet: 1 tab(s) orally once a day (21 May 2022 21:16)  nystatin 100,000 units/g topical powder: 1 application topically 3 times a day (29 May 2022 16:35)  omeprazole 20 mg oral delayed release capsule: orally 2 times a day (21 May 2022 23:14)  polyethylene glycol 3350 oral powder for reconstitution: 17 gram(s) by gastrostomy tube every 12 hours (21 May 2022 21:16)  senna 8.6 mg oral tablet: 3 tab(s) by gastrostomy tube once a day (at bedtime) (21 May 2022 21:16)  simethicone 80 mg oral tablet, chewable: 1 tab(s) by gastrostomy tube every 6 hours (21 May 2022 21:16)  Tylenol 325 mg oral tablet: 2 tab(s) by gastrostomy tube once a day; 60 minutes prior to dressing change  (21 May 2022 21:16)  Tylenol 325 mg oral tablet: 2 tab(s) by gastrostomy tube every 6 hours, As Needed (21 May 2022 21:16)    MEDICATIONS  (STANDING):  chlorhexidine 0.12% Liquid 15 milliLiter(s) Oral Mucosa every 12 hours  collagenase Ointment 1 Application(s) Topical daily  dextrose 5%. 1000 milliLiter(s) (50 mL/Hr) IV Continuous <Continuous>  dextrose 5%. 1000 milliLiter(s) (100 mL/Hr) IV Continuous <Continuous>  dextrose 50% Injectable 25 Gram(s) IV Push once  dextrose 50% Injectable 12.5 Gram(s) IV Push once  dextrose 50% Injectable 25 Gram(s) IV Push once  folic acid 1 milliGRAM(s) Oral daily  furosemide   Injectable 40 milliGRAM(s) IV Push daily  glucagon  Injectable 1 milliGRAM(s) IntraMuscular once  heparin   Injectable 5000 Unit(s) SubCutaneous every 8 hours  insulin glargine Injectable (LANTUS) 8 Unit(s) SubCutaneous every morning  insulin lispro (ADMELOG) corrective regimen sliding scale   SubCutaneous every 6 hours  lactobacillus acidophilus 1 Tablet(s) Oral daily  LORazepam     Tablet 2 milliGRAM(s) Oral every 4 hours  methocarbamol 500 milliGRAM(s) Oral two times a day  multivitamin 1 Tablet(s) Oral daily  nystatin Powder 1 Application(s) Topical every 12 hours  pantoprazole  Injectable 40 milliGRAM(s) IV Push daily  piperacillin/tazobactam IVPB.. 3.375 Gram(s) IV Intermittent every 8 hours  povidone iodine 10% Solution 1 Application(s) Topical daily  senna 3 Tablet(s) Oral at bedtime  simethicone 80 milliGRAM(s) Chew every 6 hours    MEDICATIONS  (PRN):  acetaminophen    Suspension .. 650 milliGRAM(s) Oral every 6 hours PRN Temp greater or equal to 38C (100.4F), Mild Pain (1 - 3)  albuterol/ipratropium for Nebulization 3 milliLiter(s) Nebulizer every 6 hours PRN Shortness of Breath and/or Wheezing  dextrose Oral Gel 15 Gram(s) Oral once PRN Blood Glucose LESS THAN 70 milliGRAM(s)/deciliter  LORazepam   Injectable 2 milliGRAM(s) IV Push every 4 hours PRN Agitation      REVIEW OF SYSTEMS:  General: on vent    T(F): 97.6 (06-06-22 @ 12:45), Max: 98.9 (06-06-22 @ 00:03)  HR: 58 (06-06-22 @ 14:00) (53 - 70)  BP: 113/59 (06-06-22 @ 14:00) (105/52 - 136/62)  RR: 24 (06-06-22 @ 14:00) (21 - 26)  SpO2: 100% (06-06-22 @ 14:00) (99% - 100%)  Wt(kg): --    PHYSICAL EXAM:  General: trach, vent  Respiratory: b/l air entry  Cardiovascular: S1 S2  Gastrointestinal: soft  Extremities: edema    Mode: AC/ CMV (Assist Control/ Continuous Mandatory Ventilation)  RR (machine): 24  TV (machine): 400  FiO2: 40  PEEP: 5  ITime: 0.9  MAP: 13  PIP: 27      06-06    142  |  107  |  40<H>  ----------------------------<  149<H>  5.1   |  26  |  1.14    Ca    9.2      06 Jun 2022 06:40    TPro  6.8  /  Alb  2.1<L>  /  TBili  0.6  /  DBili  x   /  AST  16  /  ALT  18  /  AlkPhos  122<H>  06-06                          10.4   4.98  )-----------( 252      ( 06 Jun 2022 06:40 )             35.5       Hemoglobin: 10.4 g/dL (06-06 @ 06:40)  Hematocrit: 35.5 % (06-06 @ 06:40)  Calcium, Total Serum: 9.2 mg/dL (06-06 @ 06:40)  Blood Urea Nitrogen, Serum: 40 mg/dL (06-06 @ 06:40)      Creatinine, Serum: 1.14 (06-06 @ 06:40)  Creatinine, Serum: 1.22 (06-05 @ 06:37)  Creatinine, Serum: 1.46 (06-04 @ 06:28)        LIVER FUNCTIONS - ( 06 Jun 2022 06:40 )  Alb: 2.1 g/dL / Pro: 6.8 g/dL / ALK PHOS: 122 U/L / ALT: 18 U/L DA / AST: 16 U/L / GGT: x                 I&O's Detail    05 Jun 2022 07:01  -  06 Jun 2022 07:00  --------------------------------------------------------  IN:    Free Water: 500 mL    Glucerna 1.5: 1440 mL    IV PiggyBack: 300 mL  Total IN: 2240 mL    OUT:    Voided (mL): 800 mL  Total OUT: 800 mL    Total NET: 1440 mL      06 Jun 2022 07:01  -  06 Jun 2022 16:21  --------------------------------------------------------  IN:    Free Water: 200 mL    Glucerna 1.5: 420 mL  Total IN: 620 mL    OUT:  Total OUT: 0 mL    Total NET: 620 mL            Culture - Urine (collected 03 Jun 2022 21:53)  Source: Catheterized Catheterized  Final Report (05 Jun 2022 17:10):    <10,000 CFU/mL Normal Urogenital Elvira    Culture - Blood (collected 03 Jun 2022 18:12)  Source: .Blood Blood-Peripheral  Preliminary Report (05 Jun 2022 01:01):    No growth to date.    Culture - Blood (collected 03 Jun 2022 18:12)  Source: .Blood Blood-Peripheral  Preliminary Report (05 Jun 2022 01:01):    No growth to date.    Culture - Sputum (collected 02 Jun 2022 14:57)  Source: Trach Asp Tracheal Aspirate  Gram Stain (02 Jun 2022 23:35):    Rare polymorphonuclear leukocytes per low power field    Rare Squamous epithelial cells per low power field    Moderate Gram Negative Rods per oil power field    Moderate Gram Negative Coccobacilli per oil power field  Final Report (05 Jun 2022 17:27):    Numerous Serratia marcescens    Moderate Pseudomonas aeruginosa (Carbapenem Resistant)    Normal Respiratory Elvira present  Organism: Serratia marcescens  Pseudomonas aeruginosa (Carbapenem Resistant) (05 Jun 2022 17:27)  Organism: Pseudomonas aeruginosa (Carbapenem Resistant) (05 Jun 2022 17:27)    Sensitivities:      -  Amikacin: S <=16      -  Aztreonam: S <=4      -  Cefepime: S 4      -  Ceftazidime: S 4      -  Ciprofloxacin: S <=0.25      -  Gentamicin: S <=2      -  Imipenem: R >8      -  Levofloxacin: S <=0.5      -  Meropenem: R 8      -  Piperacillin/Tazobactam: S <=8      -  Tobramycin: S <=2      Method Type: PRATIK  Organism: Serratia marcescens (05 Jun 2022 17:27)    Sensitivities:      -  Amikacin: S <=16      -  Amoxicillin/Clavulanic Acid: R >16/8      -  Ampicillin: R >16 These ampicillin results predict results for amoxicillin      -  Ampicillin/Sulbactam: R >16/8 Enterobacter, Klebsiella aerogenes, Citrobacter, and Serratia may develop resistance during prolonged therapy (3-4 days)      -  Aztreonam: R >16      -  Cefazolin: R >16 Enterobacter, Klebsiella aerogenes, Citrobacter, and Serratia may develop resistance during prolonged therapy (3-4 days)      -  Cefepime: R >16      -  Cefoxitin: R >16      -  Ceftriaxone: R >32 Enterobacter, Klebsiella aerogenes, Citrobacter, and Serratia may develop resistance during prolonged therapy      -  Ciprofloxacin: R >2      -  Ertapenem: S <=0.5      -  Gentamicin: S <=2      -  Levofloxacin: R 2      -  Meropenem: S <=1      -  Piperacillin/Tazobactam: I 32      -  Tobramycin: S 4      -  Trimethoprim/Sulfamethoxazole: S <=0.5/9.5      Method Type: PRATIK        < from: CT Chest No Cont (06.01.22 @ 23:29) >    ACC: [id number] EXAM:  CT CHEST                          PROCEDURE DATE:  06/01/2022          INTERPRETATION:  CLINICAL INFORMATION: Respiratory distress    COMPARISON: 5/25/2022.    CONTRAST/COMPLICATIONS:  IV Contrast: NONE  Oral Contrast: NONE  Complications: None reported at time of study completion    PROCEDURE:  CT of the Chest was performed.  Sagittal and coronal reformats were performed.    FINDINGS:  Moderate bilateral pleural effusions noted with interval increase in size   as well as increase in bibasilar dependent compressive atelectasis.   Widespread groundglass parenchymal opacification and  interlobular septal   thickening similar to prior most compatible with pulmonary edema.  Increase in bilateral patchy scattered airspace opacities with peripheral   predominance suspicious for  superimposed multifocal pneumonia.   Differential diagnosis includes Covid 19 infection.  Retained secretions in the right mainstem bronchus. Tracheostomy cannula   reidentified in situ. Diffuselypatulous thoracic esophagus similar to   prior.  Gastrostomy tube in position.  The remainder study unchanged.    IMPRESSION:  Pulmonary edema. Worsening of bilateral pleural effusions likely   reflecting progressive CHF.    Increase in bilateral patchy airspace opacities concerning for   superimposed multifocal pneumonia    --- End of Report ---            NA LIMA MD; Attending Radiologist  This document has been electronically signed. Jun 2 2022  8:52AM    < end of copied text >

## 2022-06-06 NOTE — PROGRESS NOTE ADULT - ASSESSMENT
peg leakage    plan  in and out  ppi once a day  gerd precautions  dressing changes to the peg site  check electrolytes and supplement accordingly    Advanced care planning was discussed with patient and family.  Advanced care planning forms were reviewed and discussed.  Risks, benefits and alternatives of gastroenterologic procedures were discussed in detail and all questions were answered.    30 minutes spent.

## 2022-06-06 NOTE — PROGRESS NOTE ADULT - SUBJECTIVE AND OBJECTIVE BOX
Chief Complaint: Respiratory distress    Interval Events: No events overnight.    Review of Systems:  Unable to obtain    Physical Exam:  Vital Signs Last 24 Hrs  T(C): 36.6 (06 Jun 2022 08:15), Max: 37.2 (05 Jun 2022 14:00)  T(F): 97.9 (06 Jun 2022 08:15), Max: 98.9 (05 Jun 2022 14:00)  HR: 57 (06 Jun 2022 08:00) (53 - 76)  BP: 105/52 (06 Jun 2022 08:00) (105/52 - 135/61)  BP(mean): 69 (06 Jun 2022 08:00) (69 - 84)  RR: 24 (06 Jun 2022 08:00) (21 - 32)  SpO2: 100% (06 Jun 2022 08:00) (99% - 100%)  General: NAD  HEENT: Trach  Neck: No JVD, no carotid bruit  Lungs: Coarse bilaterally  CV: RRR, nl S1/S2, no M/R/G  Abdomen: S/NT/ND, +BS  Extremities: No LE edema, no cyanosis  Neuro: AAOx0  Skin: No rash    Labs:    06-06    142  |  107  |  40<H>  ----------------------------<  149<H>  5.1   |  26  |  1.14    Ca    9.2      06 Jun 2022 06:40    TPro  6.8  /  Alb  2.1<L>  /  TBili  0.6  /  DBili  x   /  AST  16  /  ALT  18  /  AlkPhos  122<H>  06-06                        10.4   4.98  )-----------( 252      ( 06 Jun 2022 06:40 )             35.5       Telemetry: Sinus rhythm

## 2022-06-06 NOTE — PROGRESS NOTE ADULT - SUBJECTIVE AND OBJECTIVE BOX
[facility]EC[facility]    Allergies    codeine (Hives)    Intolerances        PAST MEDICAL & SURGICAL HISTORY:  Dementia of frontal lobe type      Aphasic stroke      Diabetes mellitus      Respiratory failure      Hypertension      GERD (gastroesophageal reflux disease)      Constipation      Respiratory failure      CVA (cerebral vascular accident)      HTN (hypertension)      DM (diabetes mellitus)      Advanced dementia      COVID-19 virus detected      Quadriplegia      Pneumonia      Type II diabetes mellitus      Hx of appendectomy      Gastrostomy in place      Tracheostomy in place      Tracheostomy tube present      Feeding by G-tube          FAMILY HISTORY:  No pertinent family history in first degree relatives        Home Medications:  albuterol 90 mcg/inh inhalation aerosol with adapter: 2  inhaled every 6 hours (21 May 2022 21:16)  Bacid (LAC) oral tablet: 2 tab(s) by gastrostomy tube once a day (21 May 2022 21:16)  Betadine 10% topical swab: cleanse right and left great toes (21 May 2022 21:16)  Carafate 1 g/10 mL oral suspension: 10 milliliter(s) by gastrostomy tube 4 times a day (before meals and at bedtime) for 14 days (Started 6/4/21) (21 May 2022 21:16)  chlorhexidine 0.12% mucous membrane liquid: 15 milliliter(s) mucous membrane 2 times a day (21 May 2022 21:16)  Eucerin topical cream: Apply topically to affected area once a day bilateral feet (21 May 2022 21:16)  folic acid 1 mg oral tablet: 1 tab(s) orally once a day (21 May 2022 21:16)  insulin glargine 100 units/mL subcutaneous solution: 8 unit(s) subcutaneous once a day (in the morning) (01 Jun 2022 08:24)  ipratropium-albuterol 0.5 mg-2.5 mg/3 mL inhalation solution: 3 milliliter(s) inhaled 4 times a day (21 May 2022 21:16)  LORazepam 1 mg oral tablet: 1 tab(s) by gastrostomy tube every 4 hours (21 May 2022 21:16)  methocarbamol 500 mg oral tablet: 1 tab(s) by gastrostomy tube 2 times a day (21 May 2022 21:16)  MiraLax oral powder for reconstitution:  (21 May 2022 23:14)  Multiple Vitamins oral tablet: 1 tab(s) orally once a day (21 May 2022 21:16)  nystatin 100,000 units/g topical powder: 1 application topically 3 times a day (29 May 2022 16:35)  omeprazole 20 mg oral delayed release capsule: orally 2 times a day (21 May 2022 23:14)  polyethylene glycol 3350 oral powder for reconstitution: 17 gram(s) by gastrostomy tube every 12 hours (21 May 2022 21:16)  senna 8.6 mg oral tablet: 3 tab(s) by gastrostomy tube once a day (at bedtime) (21 May 2022 21:16)  simethicone 80 mg oral tablet, chewable: 1 tab(s) by gastrostomy tube every 6 hours (21 May 2022 21:16)  Tylenol 325 mg oral tablet: 2 tab(s) by gastrostomy tube once a day; 60 minutes prior to dressing change  (21 May 2022 21:16)  Tylenol 325 mg oral tablet: 2 tab(s) by gastrostomy tube every 6 hours, As Needed (21 May 2022 21:16)      MEDICATIONS  (STANDING):  chlorhexidine 0.12% Liquid 15 milliLiter(s) Oral Mucosa every 12 hours  collagenase Ointment 1 Application(s) Topical daily  dextrose 5%. 1000 milliLiter(s) (50 mL/Hr) IV Continuous <Continuous>  dextrose 5%. 1000 milliLiter(s) (100 mL/Hr) IV Continuous <Continuous>  dextrose 50% Injectable 25 Gram(s) IV Push once  dextrose 50% Injectable 12.5 Gram(s) IV Push once  dextrose 50% Injectable 25 Gram(s) IV Push once  folic acid 1 milliGRAM(s) Oral daily  glucagon  Injectable 1 milliGRAM(s) IntraMuscular once  heparin   Injectable 5000 Unit(s) SubCutaneous every 8 hours  insulin glargine Injectable (LANTUS) 8 Unit(s) SubCutaneous every morning  insulin lispro (ADMELOG) corrective regimen sliding scale   SubCutaneous every 6 hours  lactobacillus acidophilus 1 Tablet(s) Oral daily  LORazepam     Tablet 2 milliGRAM(s) Oral every 4 hours  methocarbamol 500 milliGRAM(s) Oral two times a day  multivitamin 1 Tablet(s) Oral daily  nystatin Powder 1 Application(s) Topical every 12 hours  pantoprazole  Injectable 40 milliGRAM(s) IV Push daily  piperacillin/tazobactam IVPB.. 3.375 Gram(s) IV Intermittent every 8 hours  povidone iodine 10% Solution 1 Application(s) Topical daily  senna 3 Tablet(s) Oral at bedtime  simethicone 80 milliGRAM(s) Chew every 6 hours    MEDICATIONS  (PRN):  acetaminophen    Suspension .. 650 milliGRAM(s) Oral every 6 hours PRN Temp greater or equal to 38C (100.4F), Mild Pain (1 - 3)  albuterol/ipratropium for Nebulization 3 milliLiter(s) Nebulizer every 6 hours PRN Shortness of Breath and/or Wheezing  dextrose Oral Gel 15 Gram(s) Oral once PRN Blood Glucose LESS THAN 70 milliGRAM(s)/deciliter  LORazepam   Injectable 2 milliGRAM(s) IV Push every 4 hours PRN Agitation      Diet, NPO with Tube Feed:   Tube Feeding Modality: Gastrostomy  Glucerna 1.5 Horacio  Total Volume for 24 Hours (mL): 1200  Continuous  Until Goal Tube Feed Rate (mL per Hour): 60  Tube Feed Duration (in Hours): 20  Tube Feed Start Time: 00:00  Free Water Flush  Pump   Rate (mL per Hour): 25   Frequency: Every Hour    Duration (Hours): 24 (06-02-22 @ 11:46) [Active]          Vital Signs Last 24 Hrs  T(C): 36.6 (06 Jun 2022 08:15), Max: 37.2 (05 Jun 2022 14:00)  T(F): 97.9 (06 Jun 2022 08:15), Max: 98.9 (05 Jun 2022 14:00)  HR: 57 (06 Jun 2022 08:00) (53 - 76)  BP: 105/52 (06 Jun 2022 08:00) (105/52 - 135/61)  BP(mean): 69 (06 Jun 2022 08:00) (69 - 84)  RR: 24 (06 Jun 2022 08:00) (21 - 32)  SpO2: 100% (06 Jun 2022 08:00) (99% - 100%)      06-05-22 @ 07:01  -  06-06-22 @ 07:00  --------------------------------------------------------  IN: 2240 mL / OUT: 800 mL / NET: 1440 mL        Mode: AC/ CMV (Assist Control/ Continuous Mandatory Ventilation), RR (machine): 24, TV (machine): 400, FiO2: 40, PEEP: 5, ITime: 0.9, MAP: 14, PIP: 28      LABS:                        10.4   4.98  )-----------( 252      ( 06 Jun 2022 06:40 )             35.5     06-06    142  |  107  |  40<H>  ----------------------------<  149<H>  5.1   |  26  |  1.14    Ca    9.2      06 Jun 2022 06:40    TPro  6.8  /  Alb  2.1<L>  /  TBili  0.6  /  DBili  x   /  AST  16  /  ALT  18  /  AlkPhos  122<H>  06-06              WBC:  WBC Count: 4.98 K/uL (06-06 @ 06:40)  WBC Count: 5.44 K/uL (06-05 @ 06:37)  WBC Count: 6.00 K/uL (06-04 @ 06:28)  WBC Count: 11.99 K/uL (06-03 @ 06:06)      MICROBIOLOGY:  RECENT CULTURES:  06-03 Catheterized Catheterized XXXX XXXX   <10,000 CFU/mL Normal Urogenital Elvira    06-03 .Blood Blood-Peripheral XXXX XXXX   No growth to date.    06-02 Trach Asp Tracheal Aspirate Serratia marcescens  Pseudomonas aeruginosa (Carbapenem Resistant)   Rare polymorphonuclear leukocytes per low power field  Rare Squamous epithelial cells per low power field  Moderate Gram Negative Rods per oil power field  Moderate Gram Negative Coccobacilli per oil power field   Numerous Serratia marcescens  Moderate Pseudomonas aeruginosa (Carbapenem Resistant)  Normal Respiratory Elvira present                    Sodium:  Sodium, Serum: 142 mmol/L (06-06 @ 06:40)  Sodium, Serum: 143 mmol/L (06-05 @ 06:37)  Sodium, Serum: 146 mmol/L (06-04 @ 06:28)  Sodium, Serum: 144 mmol/L (06-03 @ 06:06)      1.14 mg/dL 06-06 @ 06:40  1.22 mg/dL 06-05 @ 06:37  1.46 mg/dL 06-04 @ 06:28  1.27 mg/dL 06-03 @ 06:06      Hemoglobin:  Hemoglobin: 10.4 g/dL (06-06 @ 06:40)  Hemoglobin: 11.3 g/dL (06-05 @ 06:37)  Hemoglobin: 10.7 g/dL (06-04 @ 06:28)  Hemoglobin: 11.3 g/dL (06-03 @ 06:06)      Platelets: Platelet Count - Automated: 252 K/uL (06-06 @ 06:40)  Platelet Count - Automated: 165 K/uL (06-05 @ 06:37)  Platelet Count - Automated: 232 K/uL (06-04 @ 06:28)  Platelet Count - Automated: 259 K/uL (06-03 @ 06:06)      LIVER FUNCTIONS - ( 06 Jun 2022 06:40 )  Alb: 2.1 g/dL / Pro: 6.8 g/dL / ALK PHOS: 122 U/L / ALT: 18 U/L DA / AST: 16 U/L / GGT: x                 RADIOLOGY & ADDITIONAL STUDIES:      MICROBIOLOGY:  RECENT CULTURES:  06-03 Catheterized Catheterized XXXX XXXX   <10,000 CFU/mL Normal Urogenital Elvira    06-03 .Blood Blood-Peripheral XXXX XXXX   No growth to date.    06-02 Trach Asp Tracheal Aspirate Serratia marcescens  Pseudomonas aeruginosa (Carbapenem Resistant)   Rare polymorphonuclear leukocytes per low power field  Rare Squamous epithelial cells per low power field  Moderate Gram Negative Rods per oil power field  Moderate Gram Negative Coccobacilli per oil power field   Numerous Serratia marcescens  Moderate Pseudomonas aeruginosa (Carbapenem Resistant)  Normal Respiratory Elvira present

## 2022-06-06 NOTE — PROGRESS NOTE ADULT - ASSESSMENT
AGE/SEX.   76 f  DOA.  6/2/2022  CC .  6/2/2022 Resp distress at [facility]  PMH .  pmh Hosp stay 5/21-6/1/2022 ceftazidime avibactam 1.25x2 5/22- 5/30/2022    pmh Hosp stay given zosyn 4/21  pmh Pleural effsn   5/25/2022 1.5 l clear pl effsn removed left side IR  5/25/2022 g 183 l 144/381 .37 p 3.4/5.3 .64 p 23 l 36   5/25/2022l pl fluid  lymph pred exudate   cyto (-)     pmh TRANSFUSION.  5/23/2022 1 u prbc  pmh Pneumonia    pmh HTN,   pmh DM,   pmh CVA,   pmh  chronic respiratory failure   pmh s/p trach, PEG,   pmh cardiac arrest  REVIEW OF SYMPTOMS      Able to give (reliable) ROS  NO    PHYSICAL EXAM    Has trach  peg    HEENT Unremarkable  atraumatic   RESP Fair air entry EXP prolonged    Harsh breath sound Resp distres mild   CARDIAC S1 S2 No S3     NO JVD    ABDOMEN SOFT BS PRESENT NOT DISTENDED No hepatosplenomegaly   PEDAL EDEMA present No calf tenderness  NO rash     MAIN ISSUES .  CHF HFPEF AOC poa 6/1   VDRF pmh   FEVER 6/3/2022    COVID/ICU/CODE STATUS.                       COVID  STATUS.    6/2/2022 scv2 (-)        ICU STAY. 6/2/2022  GOC.  6/2/2022 full code     BEST PRACTICE ISSUES.                                                  HEAD OF BED ELEVATION. Yes  DVT PROPHYLAXIS.   6/1 hpsc    SQUIRES PROPHYLAXIS. 6/1 protonix 40                                                                                        DIET.  6/2/2022 glucerna 1.5 1440              VITALS/PO/IO/VENT/DRIPS.    6/6/2022 afeb 64 112/60   6/6/2022 ac 24/400/5/.4      PROBLEM DATA/ASSESSMENT RECOMMENDATIONS (A/R).    HEMODYNAMICS.   Monitor and optimize bp   Target MAP to miguel  65 (+)    RESP.   Monitor and optimize  po   Target po to remain 90-95%    ABG.   6/3/2022  6a 24/400/5/.4 750/41/62     PMH/PSH PROBLEMS.  Management continued/modified as indicated    VENT MANAGEMENT.   HOB elevation  Target Pplat 30 (-)  Target PO 90-95%  Target pH 730 (+)  Daily spontaneous breathing trials   Daily sedation vacation         INFECTION SOURCE DD.   INFECTION A/R.   Shje is status post recent course of abio ceftazidime avibactam 1.25x2 5/22- 5/30/2022    6/3 now on zosyn  Trach 6/2 1) Mod gnr 2) Mod gn coccobacilli  SERRATIA CARBAPENEM RESISTANT PSEUDOMONAS   Pt has crp in sputum   will follow with id ama    COPD.  6/1/2022 duoneb.4  PLEURAL EFFUSIONS  ct chest 6/1/2022 Pulm edema Worsening bl effsns likely reflecting progressive chf   Case dw cts Dr JOSE Kellogg and he feels that at this point pleurex is NOT indicated   will dw ir re thoracentesis   CHF.  echo 5/30/2022 ef 65% pasp 60 dialted rv   6/2/2022 lasix 40  ANEMIA.  Hb 6/2-6/3/2022 Hb 10.6 - 11.3   RENAL.  Na 6/2-6/3/2022 Na 145 - 144  Cr 6/2-6/3-6/4-6/5/2022 Cr 1.1- 1.2 - 1.4 - 1.2   DM.   6/1 riss   6/1 lantus 8  ANXIETY.  6/2/2022 lorazepam 2.6   DECUB.   6/1 collagenase r gluteal wound       TIME SPENT   Over 39 minutes aggregate critical care time spent on encounter; activities included   direct patient care, counseling and/or coordinating care reviewing notes, lab data/ imaging , discussion with multidisciplinary team/ patient  /family and explaining in detail risks, benefits, alternatives  of the recommendations     CHAPINCITO Cooley f

## 2022-06-06 NOTE — CONSULT NOTE ADULT - CONSULT REQUESTED DATE/TIME
02-Jun-2022
01-Jun-2022 23:17
03-Jun-2022 18:16
06-Jun-2022 16:20
02-Jun-2022 08:57
03-Jun-2022 09:04

## 2022-06-07 LAB
-  CEFTAZIDIME/AVIBACTAM: SIGNIFICANT CHANGE UP
-  CEFTAZIDIME/AVIBACTAM: SIGNIFICANT CHANGE UP
-  CEFTOLOZANE/TAZOBACTAM: SIGNIFICANT CHANGE UP
-  CEFTOLOZANE/TAZOBACTAM: SIGNIFICANT CHANGE UP
ALBUMIN SERPL ELPH-MCNC: 2.3 G/DL — LOW (ref 3.3–5)
ALP SERPL-CCNC: 137 U/L — HIGH (ref 30–120)
ALT FLD-CCNC: 17 U/L DA — SIGNIFICANT CHANGE UP (ref 10–60)
ANION GAP SERPL CALC-SCNC: 9 MMOL/L — SIGNIFICANT CHANGE UP (ref 5–17)
AST SERPL-CCNC: 15 U/L — SIGNIFICANT CHANGE UP (ref 10–40)
BASOPHILS # BLD AUTO: 0.04 K/UL — SIGNIFICANT CHANGE UP (ref 0–0.2)
BASOPHILS NFR BLD AUTO: 0.7 % — SIGNIFICANT CHANGE UP (ref 0–2)
BILIRUB SERPL-MCNC: 0.5 MG/DL — SIGNIFICANT CHANGE UP (ref 0.2–1.2)
BUN SERPL-MCNC: 43 MG/DL — HIGH (ref 7–23)
CALCIUM SERPL-MCNC: 9.7 MG/DL — SIGNIFICANT CHANGE UP (ref 8.4–10.5)
CHLORIDE SERPL-SCNC: 100 MMOL/L — SIGNIFICANT CHANGE UP (ref 96–108)
CO2 SERPL-SCNC: 28 MMOL/L — SIGNIFICANT CHANGE UP (ref 22–31)
CREAT SERPL-MCNC: 1.3 MG/DL — SIGNIFICANT CHANGE UP (ref 0.5–1.3)
EGFR: 42 ML/MIN/1.73M2 — LOW
EOSINOPHIL # BLD AUTO: 0.24 K/UL — SIGNIFICANT CHANGE UP (ref 0–0.5)
EOSINOPHIL NFR BLD AUTO: 4.2 % — SIGNIFICANT CHANGE UP (ref 0–6)
GLUCOSE SERPL-MCNC: 177 MG/DL — HIGH (ref 70–99)
HCT VFR BLD CALC: 41.2 % — SIGNIFICANT CHANGE UP (ref 34.5–45)
HGB BLD-MCNC: 12.2 G/DL — SIGNIFICANT CHANGE UP (ref 11.5–15.5)
IMM GRANULOCYTES NFR BLD AUTO: 0.5 % — SIGNIFICANT CHANGE UP (ref 0–1.5)
LYMPHOCYTES # BLD AUTO: 1.52 K/UL — SIGNIFICANT CHANGE UP (ref 1–3.3)
LYMPHOCYTES # BLD AUTO: 26.5 % — SIGNIFICANT CHANGE UP (ref 13–44)
MCHC RBC-ENTMCNC: 26.1 PG — LOW (ref 27–34)
MCHC RBC-ENTMCNC: 29.6 GM/DL — LOW (ref 32–36)
MCV RBC AUTO: 88 FL — SIGNIFICANT CHANGE UP (ref 80–100)
MONOCYTES # BLD AUTO: 0.51 K/UL — SIGNIFICANT CHANGE UP (ref 0–0.9)
MONOCYTES NFR BLD AUTO: 8.9 % — SIGNIFICANT CHANGE UP (ref 2–14)
NEUTROPHILS # BLD AUTO: 3.4 K/UL — SIGNIFICANT CHANGE UP (ref 1.8–7.4)
NEUTROPHILS NFR BLD AUTO: 59.2 % — SIGNIFICANT CHANGE UP (ref 43–77)
NRBC # BLD: 0 /100 WBCS — SIGNIFICANT CHANGE UP (ref 0–0)
PLATELET # BLD AUTO: 284 K/UL — SIGNIFICANT CHANGE UP (ref 150–400)
POTASSIUM SERPL-MCNC: 4.7 MMOL/L — SIGNIFICANT CHANGE UP (ref 3.5–5.3)
POTASSIUM SERPL-SCNC: 4.7 MMOL/L — SIGNIFICANT CHANGE UP (ref 3.5–5.3)
PROT SERPL-MCNC: 8.5 G/DL — HIGH (ref 6–8.3)
RBC # BLD: 4.68 M/UL — SIGNIFICANT CHANGE UP (ref 3.8–5.2)
RBC # FLD: 19.8 % — HIGH (ref 10.3–14.5)
SODIUM SERPL-SCNC: 137 MMOL/L — SIGNIFICANT CHANGE UP (ref 135–145)
WBC # BLD: 5.74 K/UL — SIGNIFICANT CHANGE UP (ref 3.8–10.5)
WBC # FLD AUTO: 5.74 K/UL — SIGNIFICANT CHANGE UP (ref 3.8–10.5)

## 2022-06-07 PROCEDURE — 99233 SBSQ HOSP IP/OBS HIGH 50: CPT

## 2022-06-07 RX ORDER — ERTAPENEM SODIUM 1 G/1
1000 INJECTION, POWDER, LYOPHILIZED, FOR SOLUTION INTRAMUSCULAR; INTRAVENOUS ONCE
Refills: 0 | Status: COMPLETED | OUTPATIENT
Start: 2022-06-07 | End: 2022-06-07

## 2022-06-07 RX ORDER — ERTAPENEM SODIUM 1 G/1
INJECTION, POWDER, LYOPHILIZED, FOR SOLUTION INTRAMUSCULAR; INTRAVENOUS
Refills: 0 | Status: DISCONTINUED | OUTPATIENT
Start: 2022-06-07 | End: 2022-06-10

## 2022-06-07 RX ORDER — ERTAPENEM SODIUM 1 G/1
1000 INJECTION, POWDER, LYOPHILIZED, FOR SOLUTION INTRAMUSCULAR; INTRAVENOUS EVERY 24 HOURS
Refills: 0 | Status: DISCONTINUED | OUTPATIENT
Start: 2022-06-08 | End: 2022-06-10

## 2022-06-07 RX ADMIN — PIPERACILLIN AND TAZOBACTAM 25 GRAM(S): 4; .5 INJECTION, POWDER, LYOPHILIZED, FOR SOLUTION INTRAVENOUS at 09:50

## 2022-06-07 RX ADMIN — NYSTATIN CREAM 1 APPLICATION(S): 100000 CREAM TOPICAL at 06:01

## 2022-06-07 RX ADMIN — Medication 1 TABLET(S): at 11:40

## 2022-06-07 RX ADMIN — SIMETHICONE 80 MILLIGRAM(S): 80 TABLET, CHEWABLE ORAL at 06:01

## 2022-06-07 RX ADMIN — SIMETHICONE 80 MILLIGRAM(S): 80 TABLET, CHEWABLE ORAL at 17:23

## 2022-06-07 RX ADMIN — Medication 1 APPLICATION(S): at 11:42

## 2022-06-07 RX ADMIN — Medication 2: at 06:07

## 2022-06-07 RX ADMIN — METHOCARBAMOL 500 MILLIGRAM(S): 500 TABLET, FILM COATED ORAL at 17:24

## 2022-06-07 RX ADMIN — Medication 2: at 11:41

## 2022-06-07 RX ADMIN — Medication 2: at 18:00

## 2022-06-07 RX ADMIN — Medication 40 MILLIGRAM(S): at 06:00

## 2022-06-07 RX ADMIN — PIPERACILLIN AND TAZOBACTAM 25 GRAM(S): 4; .5 INJECTION, POWDER, LYOPHILIZED, FOR SOLUTION INTRAVENOUS at 00:43

## 2022-06-07 RX ADMIN — Medication 1 APPLICATION(S): at 11:41

## 2022-06-07 RX ADMIN — CHLORHEXIDINE GLUCONATE 15 MILLILITER(S): 213 SOLUTION TOPICAL at 06:01

## 2022-06-07 RX ADMIN — PIPERACILLIN AND TAZOBACTAM 25 GRAM(S): 4; .5 INJECTION, POWDER, LYOPHILIZED, FOR SOLUTION INTRAVENOUS at 17:23

## 2022-06-07 RX ADMIN — ERTAPENEM SODIUM 1000 MILLIGRAM(S): 1 INJECTION, POWDER, LYOPHILIZED, FOR SOLUTION INTRAMUSCULAR; INTRAVENOUS at 12:25

## 2022-06-07 RX ADMIN — PANTOPRAZOLE SODIUM 40 MILLIGRAM(S): 20 TABLET, DELAYED RELEASE ORAL at 11:40

## 2022-06-07 RX ADMIN — Medication 2 MILLIGRAM(S): at 14:24

## 2022-06-07 RX ADMIN — Medication 2 MILLIGRAM(S): at 06:01

## 2022-06-07 RX ADMIN — METHOCARBAMOL 500 MILLIGRAM(S): 500 TABLET, FILM COATED ORAL at 06:01

## 2022-06-07 RX ADMIN — Medication 2 MILLIGRAM(S): at 17:24

## 2022-06-07 RX ADMIN — NYSTATIN CREAM 1 APPLICATION(S): 100000 CREAM TOPICAL at 17:24

## 2022-06-07 RX ADMIN — Medication 2 MILLIGRAM(S): at 11:40

## 2022-06-07 RX ADMIN — Medication 2 MILLIGRAM(S): at 21:49

## 2022-06-07 RX ADMIN — SIMETHICONE 80 MILLIGRAM(S): 80 TABLET, CHEWABLE ORAL at 00:43

## 2022-06-07 RX ADMIN — SIMETHICONE 80 MILLIGRAM(S): 80 TABLET, CHEWABLE ORAL at 11:40

## 2022-06-07 RX ADMIN — HEPARIN SODIUM 5000 UNIT(S): 5000 INJECTION INTRAVENOUS; SUBCUTANEOUS at 06:00

## 2022-06-07 RX ADMIN — Medication 1 MILLIGRAM(S): at 11:41

## 2022-06-07 RX ADMIN — INSULIN GLARGINE 8 UNIT(S): 100 INJECTION, SOLUTION SUBCUTANEOUS at 08:15

## 2022-06-07 RX ADMIN — Medication 2: at 00:50

## 2022-06-07 RX ADMIN — Medication 2 MILLIGRAM(S): at 01:01

## 2022-06-07 RX ADMIN — CHLORHEXIDINE GLUCONATE 15 MILLILITER(S): 213 SOLUTION TOPICAL at 17:23

## 2022-06-07 NOTE — PROGRESS NOTE ADULT - ASSESSMENT
The patient is a 76 year old female with a history of HTN, DM, CVA, dementia, chronic respiratory failure s/p trach, PEG, cardiac arrest, anemia who presents with respiratory distress.    Plan:  - Echo 5/30/22 with normal LV systolic function, mod pulm HTN  - Cardiac enzymes negative  - CT chest with moderate bilateral pleural effusions  - Pleural effusions last admission consistent with exudate  - IV diuretics unlikely to help with exudative pleural effusions  - On zosyn  - Pulm and ID follow-up

## 2022-06-07 NOTE — PROGRESS NOTE ADULT - ASSESSMENT
This is an 80 y/o F with PMH of HTN, DM type 2, Cardiac Arrest, CVA, COPD, Chronic Respiratory Failure Vent Dependant s/p trach & PEG, Multiple Hospital Admissions for Aspiration & vent associated PNA, COVID-19 Infection, Anemia with GI blood loss, GERD, and Advanced Dementia who was sent from [facility] facility for acute respiratory distress.     Sepsis 2/2 VAP/PNA c/b pleural effusions  Pleural Effusions: parapneumonic vs. empyema  - prior sputum CRE P. aeruginosa and MDRO S. marascens  - 5/23 sputum cx w/ moderate CRE Pseudomonas again and mixed GNR alejandro   - s/p avycaz  x7 day completed 5/28  Pt was discharged on 6/1 and then re-admitted again for recurrent pleural effusions.   Restarted on zosyn.   - 6/2 sputum cx again w/ CRE Pseudomonas and MDRO S. marascens  Reviewed susceptibilities for above organisms w/ micro lab.   Serratia not susceptible to avycaz/zerbaxa  Plan:  C/w zosyn for CRE pseudomonas and anaeorbic coverage in setting of pleural effusions  Adding ertapenem 1gm q24h for serratia coverage  C/w vent management per ICU    Infectious Diseases will continue to follow. Please call with any questions.   Andressa Brantley M.D.  [facility], Division of Infectious Diseases [phone]

## 2022-06-07 NOTE — PROGRESS NOTE ADULT - ASSESSMENT
78 y/o F with PMH of HTN, DM type 2, Cardiac Arrest, CVA, COPD, Chronic Respiratory Failure Vent Dependant s/p trach & PEG, Multiple Hospital Admissions for Aspiration & vent associated PNA, COVID-19 Infection, Anemia with GI blood loss, GERD, and Advanced Dementia presented with acute respiratory distress.      on ABX  on LASIX  GI eval noted  Pulm follow up  I and O  pleurocentesis - pleurx discussion    HCP Marciano -  - pt is full code  s/p emp ABX - broad spectrum for poss PNA  cvs rx regimen  bronchodilators  oral and skin care  assist with needs - ADL  monitor VS and HD  CT imaging reviewed  Old records reviewed  suction PRN  trach care  peg care  nutritional optimization  decubitus prevention

## 2022-06-07 NOTE — PROGRESS NOTE ADULT - SUBJECTIVE AND OBJECTIVE BOX
Chief Complaint: Respiratory distress    Interval Events: No events overnight.    Review of Systems:  Unable to obtain    Physical Exam:  Vital Signs Last 24 Hrs  T(C): 37 (07 Jun 2022 03:37), Max: 37 (06 Jun 2022 17:15)  T(F): 98.6 (07 Jun 2022 03:37), Max: 98.6 (06 Jun 2022 17:15)  HR: 66 (07 Jun 2022 08:00) (57 - 71)  BP: 97/55 (07 Jun 2022 08:00) (95/58 - 141/52)  BP(mean): 68 (07 Jun 2022 08:00) (68 - 85)  RR: 24 (07 Jun 2022 08:00) (21 - 24)  SpO2: 100% (07 Jun 2022 08:00) (98% - 100%)  General: NAD  HEENT: Trach  Neck: No JVD, no carotid bruit  Lungs: Coarse bilaterally  CV: RRR, nl S1/S2, no M/R/G  Abdomen: S/NT/ND, +BS  Extremities: No LE edema, no cyanosis  Neuro: AAOx0  Skin: No rash    Labs:    06-07    137  |  100  |  43<H>  ----------------------------<  177<H>  4.7   |  28  |  1.30    Ca    9.7      07 Jun 2022 06:00    TPro  8.5<H>  /  Alb  2.3<L>  /  TBili  0.5  /  DBili  x   /  AST  15  /  ALT  17  /  AlkPhos  137<H>  06-07                        12.2   5.74  )-----------( 284      ( 07 Jun 2022 06:00 )             41.2       Telemetry: Sinus rhythm

## 2022-06-07 NOTE — PROGRESS NOTE ADULT - SUBJECTIVE AND OBJECTIVE BOX
Date/Time Patient Seen:  		  Referring MD:   Data Reviewed	       Patient is a 76y old  Female who presents with a chief complaint of Respiratory distress. (06 Jun 2022 23:03)      Subjective/HPI     PAST MEDICAL & SURGICAL HISTORY:  Dementia of frontal lobe type    Aphasic stroke    Diabetes mellitus    Respiratory failure    Hypertension    GERD (gastroesophageal reflux disease)    Constipation    Respiratory failure    CVA (cerebral vascular accident)    HTN (hypertension)    DM (diabetes mellitus)    Advanced dementia    COVID-19 virus detected    Quadriplegia    Pneumonia    Type II diabetes mellitus    Hx of appendectomy    Gastrostomy in place    Tracheostomy in place    Tracheostomy tube present    Feeding by G-tube          Medication list         MEDICATIONS  (STANDING):  chlorhexidine 0.12% Liquid 15 milliLiter(s) Oral Mucosa every 12 hours  collagenase Ointment 1 Application(s) Topical daily  dextrose 5%. 1000 milliLiter(s) (50 mL/Hr) IV Continuous <Continuous>  dextrose 5%. 1000 milliLiter(s) (100 mL/Hr) IV Continuous <Continuous>  dextrose 50% Injectable 25 Gram(s) IV Push once  dextrose 50% Injectable 12.5 Gram(s) IV Push once  dextrose 50% Injectable 25 Gram(s) IV Push once  folic acid 1 milliGRAM(s) Oral daily  furosemide   Injectable 40 milliGRAM(s) IV Push daily  glucagon  Injectable 1 milliGRAM(s) IntraMuscular once  heparin   Injectable 5000 Unit(s) SubCutaneous every 8 hours  insulin glargine Injectable (LANTUS) 8 Unit(s) SubCutaneous every morning  insulin lispro (ADMELOG) corrective regimen sliding scale   SubCutaneous every 6 hours  lactobacillus acidophilus 1 Tablet(s) Oral daily  LORazepam     Tablet 2 milliGRAM(s) Oral every 4 hours  methocarbamol 500 milliGRAM(s) Oral two times a day  multivitamin 1 Tablet(s) Oral daily  nystatin Powder 1 Application(s) Topical every 12 hours  pantoprazole  Injectable 40 milliGRAM(s) IV Push daily  piperacillin/tazobactam IVPB.. 3.375 Gram(s) IV Intermittent every 8 hours  povidone iodine 10% Solution 1 Application(s) Topical daily  senna 3 Tablet(s) Oral at bedtime  simethicone 80 milliGRAM(s) Chew every 6 hours    MEDICATIONS  (PRN):  acetaminophen    Suspension .. 650 milliGRAM(s) Oral every 6 hours PRN Temp greater or equal to 38C (100.4F), Mild Pain (1 - 3)  albuterol/ipratropium for Nebulization 3 milliLiter(s) Nebulizer every 6 hours PRN Shortness of Breath and/or Wheezing  dextrose Oral Gel 15 Gram(s) Oral once PRN Blood Glucose LESS THAN 70 milliGRAM(s)/deciliter  LORazepam   Injectable 2 milliGRAM(s) IV Push every 4 hours PRN Agitation         Vitals log        ICU Vital Signs Last 24 Hrs  T(C): 37 (07 Jun 2022 03:37), Max: 37 (06 Jun 2022 17:15)  T(F): 98.6 (07 Jun 2022 03:37), Max: 98.6 (06 Jun 2022 17:15)  HR: 66 (07 Jun 2022 05:51) (57 - 70)  BP: 141/52 (07 Jun 2022 02:00) (100/59 - 141/52)  BP(mean): 78 (07 Jun 2022 02:00) (69 - 85)  ABP: --  ABP(mean): --  RR: 24 (07 Jun 2022 02:00) (21 - 24)  SpO2: 98% (07 Jun 2022 05:51) (98% - 100%)       Mode: AC/ CMV (Assist Control/ Continuous Mandatory Ventilation)  RR (machine): 24  TV (machine): 400  FiO2: 30  PEEP: 5  ITime: 0.9  MAP: 16  PIP: 34      Input and Output:  I&O's Detail    05 Jun 2022 07:01  -  06 Jun 2022 07:00  --------------------------------------------------------  IN:    Free Water: 500 mL    Glucerna 1.5: 1440 mL    IV PiggyBack: 300 mL  Total IN: 2240 mL    OUT:    Voided (mL): 800 mL  Total OUT: 800 mL    Total NET: 1440 mL      06 Jun 2022 07:01  -  07 Jun 2022 06:35  --------------------------------------------------------  IN:    Free Water: 500 mL    Glucerna 1.5: 1200 mL    IV PiggyBack: 175 mL  Total IN: 1875 mL    OUT:  Total OUT: 0 mL    Total NET: 1875 mL          Lab Data                        12.2   5.74  )-----------( 284      ( 07 Jun 2022 06:00 )             41.2     06-06    142  |  107  |  40<H>  ----------------------------<  149<H>  5.1   |  26  |  1.14    Ca    9.2      06 Jun 2022 06:40    TPro  6.8  /  Alb  2.1<L>  /  TBili  0.6  /  DBili  x   /  AST  16  /  ALT  18  /  AlkPhos  122<H>  06-06            Review of Systems	      Objective     Physical Examination    heart s1s2  lung dec BS  abd soft      Pertinent Lab findings & Imaging      Annalise:  NO   Adequate UO     I&O's Detail    05 Jun 2022 07:01  -  06 Jun 2022 07:00  --------------------------------------------------------  IN:    Free Water: 500 mL    Glucerna 1.5: 1440 mL    IV PiggyBack: 300 mL  Total IN: 2240 mL    OUT:    Voided (mL): 800 mL  Total OUT: 800 mL    Total NET: 1440 mL      06 Jun 2022 07:01  -  07 Jun 2022 06:35  --------------------------------------------------------  IN:    Free Water: 500 mL    Glucerna 1.5: 1200 mL    IV PiggyBack: 175 mL  Total IN: 1875 mL    OUT:  Total OUT: 0 mL    Total NET: 1875 mL               Discussed with:     Cultures:	        Radiology

## 2022-06-07 NOTE — PROGRESS NOTE ADULT - SUBJECTIVE AND OBJECTIVE BOX
[facility], Division of Infectious Diseases  MOIZ Lora Y. Patel, S. Shah, G. [facility]  [phone]    Name: BREA BECKHAM  Age: 76y  Gender: Female  MRN: [id number]    Interval History:  Patient seen and examined at bedside this morning in [facility]  Afebrile  Notes reviewed    Antibiotics:  piperacillin/tazobactam IVPB.. 3.375 Gram(s) IV Intermittent every 8 hours      Medications:  acetaminophen    Suspension .. 650 milliGRAM(s) Oral every 6 hours PRN  albuterol/ipratropium for Nebulization 3 milliLiter(s) Nebulizer every 6 hours PRN  chlorhexidine 0.12% Liquid 15 milliLiter(s) Oral Mucosa every 12 hours  collagenase Ointment 1 Application(s) Topical daily  dextrose 5%. 1000 milliLiter(s) IV Continuous <Continuous>  dextrose 5%. 1000 milliLiter(s) IV Continuous <Continuous>  dextrose 50% Injectable 25 Gram(s) IV Push once  dextrose 50% Injectable 12.5 Gram(s) IV Push once  dextrose 50% Injectable 25 Gram(s) IV Push once  dextrose Oral Gel 15 Gram(s) Oral once PRN  folic acid 1 milliGRAM(s) Oral daily  furosemide   Injectable 40 milliGRAM(s) IV Push daily  glucagon  Injectable 1 milliGRAM(s) IntraMuscular once  heparin   Injectable 5000 Unit(s) SubCutaneous every 8 hours  insulin glargine Injectable (LANTUS) 8 Unit(s) SubCutaneous every morning  insulin lispro (ADMELOG) corrective regimen sliding scale   SubCutaneous every 6 hours  lactobacillus acidophilus 1 Tablet(s) Oral daily  LORazepam     Tablet 2 milliGRAM(s) Oral every 4 hours  LORazepam   Injectable 2 milliGRAM(s) IV Push every 4 hours PRN  methocarbamol 500 milliGRAM(s) Oral two times a day  multivitamin 1 Tablet(s) Oral daily  nystatin Powder 1 Application(s) Topical every 12 hours  pantoprazole  Injectable 40 milliGRAM(s) IV Push daily  piperacillin/tazobactam IVPB.. 3.375 Gram(s) IV Intermittent every 8 hours  povidone iodine 10% Solution 1 Application(s) Topical daily  senna 3 Tablet(s) Oral at bedtime  simethicone 80 milliGRAM(s) Chew every 6 hours      Review of Systems:  unable to obtain    Allergies: codeine (Hives)    For details regarding the patient's past medical history, social history, family history, and other miscellaneous elements, please refer the initial infectious diseases consultation and/or the admitting history and physical examination for this admission.    Objective:  Vitals:   T(C): 37 (06-07-22 @ 03:37), Max: 37 (06-06-22 @ 17:15)  HR: 66 (06-07-22 @ 08:00) (57 - 71)  BP: 97/55 (06-07-22 @ 08:00) (95/58 - 141/52)  RR: 24 (06-07-22 @ 08:00) (21 - 24)  SpO2: 100% (06-07-22 @ 08:00) (98% - 100%)    Physical Examination:  General: no acute distress  HEENT: NC/AT, EOMI  Neck: trach to vent  Cardio: S1, S2 heard, RRR, no murmurs  Resp: MV breath sounds  Abd: soft, NT, ND, PGT in place  Ext: no edema or cyanosis  Skin: warm, dry, no visible rash    Laboratory Studies:  CBC:                       12.2   5.74  )-----------( 284      ( 07 Jun 2022 06:00 )             41.2     CMP: 06-07    137  |  100  |  43<H>  ----------------------------<  177<H>  4.7   |  28  |  1.30    Ca    9.7      07 Jun 2022 06:00    TPro  8.5<H>  /  Alb  2.3<L>  /  TBili  0.5  /  DBili  x   /  AST  15  /  ALT  17  /  AlkPhos  137<H>  06-07    LIVER FUNCTIONS - ( 07 Jun 2022 06:00 )  Alb: 2.3 g/dL / Pro: 8.5 g/dL / ALK PHOS: 137 U/L / ALT: 17 U/L DA / AST: 15 U/L / GGT: x               Microbiology: reviewed    Culture - Urine (collected 06-03-22 @ 21:53)  Source: Catheterized Catheterized  Final Report (06-05-22 @ 17:10):    <10,000 CFU/mL Normal Urogenital Elvira    Culture - Blood (collected 06-03-22 @ 18:12)  Source: .Blood Blood-Peripheral  Preliminary Report (06-05-22 @ 01:01):    No growth to date.    Culture - Blood (collected 06-03-22 @ 18:12)  Source: .Blood Blood-Peripheral  Preliminary Report (06-05-22 @ 01:01):    No growth to date.    Culture - Sputum (collected 06-02-22 @ 14:57)  Source: Trach Asp Tracheal Aspirate  Gram Stain (06-02-22 @ 23:35):    Rare polymorphonuclear leukocytes per low power field    Rare Squamous epithelial cells per low power field    Moderate Gram Negative Rods per oil power field    Moderate Gram Negative Coccobacilli per oil power field  Final Report (06-05-22 @ 17:27):    Numerous Serratia marcescens    Moderate Pseudomonas aeruginosa (Carbapenem Resistant)    Normal Respiratory Elvira present  Organism: Serratia marcescens  Pseudomonas aeruginosa (Carbapenem Resistant) (06-05-22 @ 17:27)  Organism: Pseudomonas aeruginosa (Carbapenem Resistant) (06-05-22 @ 17:27)      -  Amikacin: S <=16      -  Aztreonam: S <=4      -  Cefepime: S 4      -  Ceftazidime: S 4      -  Ciprofloxacin: S <=0.25      -  Gentamicin: S <=2      -  Imipenem: R >8      -  Levofloxacin: S <=0.5      -  Meropenem: R 8      -  Piperacillin/Tazobactam: S <=8      -  Tobramycin: S <=2      Method Type: PRATIK  Organism: Serratia marcescens (06-05-22 @ 17:27)      -  Amikacin: S <=16      -  Amoxicillin/Clavulanic Acid: R >16/8      -  Ampicillin: R >16 These ampicillin results predict results for amoxicillin      -  Ampicillin/Sulbactam: R >16/8 Enterobacter, Klebsiella aerogenes, Citrobacter, and Serratia may develop resistance during prolonged therapy (3-4 days)      -  Aztreonam: R >16      -  Cefazolin: R >16 Enterobacter, Klebsiella aerogenes, Citrobacter, and Serratia may develop resistance during prolonged therapy (3-4 days)      -  Cefepime: R >16      -  Cefoxitin: R >16      -  Ceftriaxone: R >32 Enterobacter, Klebsiella aerogenes, Citrobacter, and Serratia may develop resistance during prolonged therapy      -  Ciprofloxacin: R >2      -  Ertapenem: S <=0.5      -  Gentamicin: S <=2      -  Levofloxacin: R 2      -  Meropenem: S <=1      -  Piperacillin/Tazobactam: I 32      -  Tobramycin: S 4      -  Trimethoprim/Sulfamethoxazole: S <=0.5/9.5      Method Type: PRATIK    Culture - Fungal, Body Fluid (collected 05-25-22 @ 10:39)  Source: .Body Fluid Pleural Fluid  Preliminary Report (06-04-22 @ 15:02):    No growth    Culture - Body Fluid with Gram Stain (collected 05-25-22 @ 10:39)  Source: .Body Fluid Pleural Fluid  Gram Stain (05-25-22 @ 19:50):    polymorphonuclear leukocytes seen    No organisms seen    by cytocentrifuge  Final Report (05-30-22 @ 09:04):    No growth    Culture - Acid Fast - Body Fluid w/Smear (collected 05-25-22 @ 10:39)  Source: .Body Fluid Pleural Fluid  Preliminary Report (06-04-22 @ 15:04):    No growth at 1 week.        Radiology: reviewed

## 2022-06-07 NOTE — PROGRESS NOTE ADULT - ASSESSMENT
Pt is a 75 y/o F pmhx of HTN, DM2, cardiac arrest resulting in chronic respiratory failure requiring trach and peg, CVA, COPD admitted to [facility] w/ acute hypoxic respiratory failure, septic shock secondary to a suspected VAP.     1. Acute on chronic respiratory failure requiring trach   2. Pulmonary edema  3. RYAN on CKD.   4. HFpEF     Plan:     - HOB >30 degrees to prevent aspiration   - Lasix daily for IV diuresis, avoid fluid overload  - Continue to titrate FiO2 as tolerated to maintain SpO2>92% w/ Low TV lung protective strategies.   - GI PPX w/ protonix 40mg IVP daily.   - Renal function improving, continue to monitor U/O and replace lytes as needed.   - Continue on zosyn and ertapenem as per ID for previous cultures on 6/1 for moderate CRE Pseudomonas.

## 2022-06-07 NOTE — PROGRESS NOTE ADULT - SUBJECTIVE AND OBJECTIVE BOX
Patient is a 76y old  Female who presents with a chief complaint of Respiratory distress. (07 Jun 2022 10:21)      BRIEF HOSPITAL COURSE: Pt is a 77 y/o F pmhx of HTN, DM2, cardiac arrest resulting in chronic respiratory failure requiring trach and peg, CVA, COPD admitted to [facility] w/ acute hypoxic respiratory failure, septic shock secondary to a suspected VAP.     Events last 24 hours: Remains HD stable on minimal vent settings.     PAST MEDICAL & SURGICAL HISTORY:  Dementia of frontal lobe type      Aphasic stroke      Diabetes mellitus      Respiratory failure      Hypertension      GERD (gastroesophageal reflux disease)      Constipation      Respiratory failure      CVA (cerebral vascular accident)      HTN (hypertension)      DM (diabetes mellitus)      Advanced dementia      COVID-19 virus detected      Quadriplegia      Pneumonia      Type II diabetes mellitus      Hx of appendectomy      Gastrostomy in place      Tracheostomy in place      Tracheostomy tube present      Feeding by G-tube          Review of Systems:  Unable to assess secondary to pt on vent     Medications:  ertapenem  IVPB      piperacillin/tazobactam IVPB.. 3.375 Gram(s) IV Intermittent every 8 hours    furosemide   Injectable 40 milliGRAM(s) IV Push daily    albuterol/ipratropium for Nebulization 3 milliLiter(s) Nebulizer every 6 hours PRN    acetaminophen    Suspension .. 650 milliGRAM(s) Oral every 6 hours PRN  LORazepam     Tablet 2 milliGRAM(s) Oral every 4 hours  LORazepam   Injectable 2 milliGRAM(s) IV Push every 4 hours PRN  methocarbamol 500 milliGRAM(s) Oral two times a day        pantoprazole  Injectable 40 milliGRAM(s) IV Push daily  senna 3 Tablet(s) Oral at bedtime  simethicone 80 milliGRAM(s) Chew every 6 hours      dextrose 50% Injectable 25 Gram(s) IV Push once  dextrose 50% Injectable 12.5 Gram(s) IV Push once  dextrose 50% Injectable 25 Gram(s) IV Push once  dextrose Oral Gel 15 Gram(s) Oral once PRN  glucagon  Injectable 1 milliGRAM(s) IntraMuscular once  insulin glargine Injectable (LANTUS) 8 Unit(s) SubCutaneous every morning  insulin lispro (ADMELOG) corrective regimen sliding scale   SubCutaneous every 6 hours    dextrose 5%. 1000 milliLiter(s) IV Continuous <Continuous>  dextrose 5%. 1000 milliLiter(s) IV Continuous <Continuous>  folic acid 1 milliGRAM(s) Oral daily  multivitamin 1 Tablet(s) Oral daily      chlorhexidine 0.12% Liquid 15 milliLiter(s) Oral Mucosa every 12 hours  collagenase Ointment 1 Application(s) Topical daily  nystatin Powder 1 Application(s) Topical every 12 hours  povidone iodine 10% Solution 1 Application(s) Topical daily    lactobacillus acidophilus 1 Tablet(s) Oral daily      Mode: AC/ CMV (Assist Control/ Continuous Mandatory Ventilation)  RR (machine): 24  TV (machine): 400  FiO2: 30  PEEP: 5  ITime: 0.9  MAP: 15  PIP: 32      ICU Vital Signs Last 24 Hrs  T(C): 36.4 (07 Jun 2022 20:09), Max: 37 (07 Jun 2022 03:37)  T(F): 97.6 (07 Jun 2022 20:09), Max: 98.6 (07 Jun 2022 03:37)  HR: 70 (07 Jun 2022 20:45) (57 - 73)  BP: 117/60 (07 Jun 2022 18:00) (95/58 - 141/52)  BP(mean): 78 (07 Jun 2022 18:00) (67 - 83)  ABP: --  ABP(mean): --  RR: 24 (07 Jun 2022 18:00) (18 - 24)  SpO2: 99% (07 Jun 2022 20:45) (98% - 100%)          I&O's Detail    06 Jun 2022 07:01  -  07 Jun 2022 07:00  --------------------------------------------------------  IN:    Free Water: 600 mL    Glucerna 1.5: 1440 mL    IV PiggyBack: 200 mL  Total IN: 2240 mL    OUT:  Total OUT: 0 mL    Total NET: 2240 mL      07 Jun 2022 07:01  -  07 Jun 2022 21:32  --------------------------------------------------------  IN:    Free Water: 200 mL    Glucerna 1.5: 480 mL    IV PiggyBack: 150 mL  Total IN: 830 mL    OUT:  Total OUT: 0 mL    Total NET: 830 mL            LABS:                        12.2   5.74  )-----------( 284      ( 07 Jun 2022 06:00 )             41.2     06-07    137  |  100  |  43<H>  ----------------------------<  177<H>  4.7   |  28  |  1.30    Ca    9.7      07 Jun 2022 06:00    TPro  8.5<H>  /  Alb  2.3<L>  /  TBili  0.5  /  DBili  x   /  AST  15  /  ALT  17  /  AlkPhos  137<H>  06-07          CAPILLARY BLOOD GLUCOSE      POCT Blood Glucose.: 171 mg/dL (07 Jun 2022 17:22)        CULTURES:  Culture Results:   <10,000 CFU/mL Normal Urogenital Elvira (06-03-22 @ 21:53)  Culture Results:   No growth to date. (06-03-22 @ 18:12)  Culture Results:   No growth to date. (06-03-22 @ 18:12)  Culture Results:   Numerous Serratia marcescens  Moderate Pseudomonas aeruginosa (Carbapenem Resistant)  Normal Respiratory Elvira present (06-02-22 @ 14:57)  Rapid RVP Result: NotDetec (06-02-22 @ 14:56)      Physical Examination:    General: Pt laying in hospital bed in no acute distress, on vent via trach.     HEENT: Pupils equal, reactive to light.  Symmetric.    PULM: Clear to auscultation bilaterally, no significant sputum production    CVS: Regular rate and rhythm, no murmurs, rubs, or gallops    ABD: Soft, nondistended, nontender, normoactive bowel sounds, no masses    EXT: No edema, nontender    SKIN: Warm and well perfused.     NEURO: Baseline demented, unable to assess for changes due to trach.       RADIOLOGY: < from: CT Chest No Cont (06.01.22 @ 23:29) >  ACC: [id number] EXAM:  CT CHEST                          PROCEDURE DATE:  06/01/2022          INTERPRETATION:  CLINICAL INFORMATION: Respiratory distress    COMPARISON: 5/25/2022.    CONTRAST/COMPLICATIONS:  IV Contrast: NONE  Oral Contrast: NONE  Complications: None reported at time of study completion    PROCEDURE:  CT of the Chest was performed.  Sagittal and coronal reformats were performed.    FINDINGS:  Moderate bilateral pleural effusions noted with interval increase in size   as well as increase in bibasilar dependent compressive atelectasis.   Widespread groundglass parenchymal opacification and  interlobular septal   thickening similar to prior most compatible with pulmonary edema.  Increase in bilateral patchy scattered airspace opacities with peripheral   predominance suspicious for  superimposed multifocal pneumonia.   Differential diagnosis includes Covid 19 infection.  Retained secretions in the right mainstem bronchus. Tracheostomy cannula   reidentified in situ. Diffuselypatulous thoracic esophagus similar to   prior.  Gastrostomy tube in position.  The remainder study unchanged.    IMPRESSION:  Pulmonary edema. Worsening of bilateral pleural effusions likely   reflecting progressive CHF.    Increase in bilateral patchy airspace opacities concerning for   superimposed multifocal pneumonia    --- End of Report ---            NA LIMA MD; Attending Radiologist  This document has been electronically signed. Jun 2 2022  8:52AM

## 2022-06-07 NOTE — PROGRESS NOTE ADULT - ASSESSMENT
AGE/SEX.   76 f  DOA.  6/2/2022  CC .  6/2/2022 Resp distress at [facility]  PMH .  pmh Hosp stay 5/21-6/1/2022 ceftazidime avibactam 1.25x2 5/22- 5/30/2022    pmh Hosp stay given zosyn 4/21  pmh Pleural effsn   5/25/2022 1.5 l clear pl effsn removed left side IR  5/25/2022 g 183 l 144/381 .37 p 3.4/5.3 .64 p 23 l 36   5/25/2022l pl fluid  lymph pred exudate   cyto (-)     pmh TRANSFUSION.  5/23/2022 1 u prbc  pmh Pneumonia    pmh HTN,   pmh DM,   pmh CVA,   pmh  chronic respiratory failure   pmh s/p trach, PEG,   pmh cardiac arrest  REVIEW OF SYMPTOMS      Able to give (reliable) ROS  NO    PHYSICAL EXAM    Has trach  peg    HEENT Unremarkable  atraumatic   RESP Fair air entry EXP prolonged    Harsh breath sound Resp distres mild   CARDIAC S1 S2 No S3     NO JVD    ABDOMEN SOFT BS PRESENT NOT DISTENDED No hepatosplenomegaly   PEDAL EDEMA present No calf tenderness  NO rash     MAIN ISSUES .  CHF HFPEF AOC poa 6/1   VDRF pmh   FEVER 6/3/2022  r PL EFFSN Thorac requested 6/7/2022     COVID/ICU/CODE STATUS.                       COVID  STATUS.    6/2/2022 scv2 (-)        ICU STAY. 6/2/2022  GOC.  6/2/2022 full code     BEST PRACTICE ISSUES.                                                  HEAD OF BED ELEVATION. Yes  DVT PROPHYLAXIS.   6/1 [facility]    SQUIRES PROPHYLAXIS. 6/1 protonix 40                                                                                        DIET.  6/2/2022 glucerna 1.5 1440              VITALS/PO/IO/VENT/DRIPS.   6/7/2022 afeb 60 97/55   6/7/2022 ac 24/400/5/.3       PROBLEM DATA/ASSESSMENT RECOMMENDATIONS (A/R).    HEMODYNAMICS.   Monitor and optimize bp   Target MAP to miguel  65 (+)    RESP.   Monitor and optimize  po   Target po to remain 90-95%    ABG.   6/3/2022  6a 24/400/5/.4 750/41/62     PMH/PSH PROBLEMS.  Management continued/modified as indicated    VENT MANAGEMENT.   HOB elevation  Target Pplat 30 (-)  Target PO 90-95%  Target pH 730 (+)  Daily spontaneous breathing trials   Daily sedation vacation         INFECTION SOURCE DD.   INFECTION A/R.   Genna is status post recent course of abio ceftazidime avibactam 1.25x2 5/22- 5/30/2022    6/3 now on zosyn  Trach 6/2 1) Mod gnr 2) Mod gn coccobacilli  SERRATIA CARBAPENEM RESISTANT PSEUDOMONAS   Pt has crp in sputum   will follow with id ama    INFECTION AUGUST.  W 6/2-6/3-6/5/2022 w 7.5 - 11.9 - 5.4   IAMGING.   Cxr 6/1/2022 diffuse advanced bl infiltrates with moderate basal effsns increased from 5/31   ct chest 6/1/2022 Pulm edema Worsening bl effsns likely reflecting progressive chf Increased bl patchy airspace opac concerning for superimposed mf pneum   MICROBIO.  rvp 6/2 (-)   Trach 6/2 1) Mod gnr 2) Mod gn coccobacilli  SERRATIA CARBAPENEM RESISTANT PSEUDOMONAS   6/3 urine c (-)   6/3 blod c (-)   ABIO.  6/7/2022 ertapenem 1x 7d SERRATIA   6/3 zosyn CARBAPENEM RESISTANT  pseudomonas     COPD.  6/1/2022 duoneb.4  PLEURAL EFFUSIONS  ct chest 6/1/2022 Pulm edema Worsening bl effsns likely reflecting progressive chf   Case dw cts Dr JOSE Kellogg and he feels that at this point pleurex is NOT indicated   will dw ir re thoracentesis   6/7/2022 Thorac requested   CHF.  echo 5/30/2022 ef 65% pasp 60 dialted rv   6/2/2022 lasix 40  ANEMIA.  Hb 6/2-6/3/2022 Hb 10.6 - 11.3   RENAL.  Na 6/2-6/3/2022 Na 145 - 144  Cr 6/2-6/3-6/4-6/5-6/7/2022   Cr 1.1- 1.2 - 1.4 - 1.2 -1.3  DM.   6/1 riss   6/1 lantus 8  ANXIETY.  6/2/2022 lorazepam 2.6   DECUB.   6/1 collagenase r gluteal wound     TIME SPENT   Over 39 minutes aggregate critical care time spent on encounter; activities included   direct patient care, counseling and/or coordinating care reviewing notes, lab data/ imaging , discussion with multidisciplinary team/ patient  /family and explaining in detail risks, benefits, alternatives  of the recommendations     CHAPINCITO Cooley f

## 2022-06-08 ENCOUNTER — RESULT REVIEW (OUTPATIENT)
Age: 77
End: 2022-06-08

## 2022-06-08 ENCOUNTER — TRANSCRIPTION ENCOUNTER (OUTPATIENT)
Age: 77
End: 2022-06-08

## 2022-06-08 LAB
ALBUMIN FLD-MCNC: 2.4 G/DL — SIGNIFICANT CHANGE UP
ALBUMIN SERPL ELPH-MCNC: 2.5 G/DL — LOW (ref 3.3–5)
ALP SERPL-CCNC: 144 U/L — HIGH (ref 30–120)
ALT FLD-CCNC: 17 U/L DA — SIGNIFICANT CHANGE UP (ref 10–60)
ANION GAP SERPL CALC-SCNC: 11 MMOL/L — SIGNIFICANT CHANGE UP (ref 5–17)
AST SERPL-CCNC: 14 U/L — SIGNIFICANT CHANGE UP (ref 10–40)
B PERT IGG+IGM PNL SER: CLEAR — SIGNIFICANT CHANGE UP
BILIRUB SERPL-MCNC: 0.6 MG/DL — SIGNIFICANT CHANGE UP (ref 0.2–1.2)
BUN SERPL-MCNC: 45 MG/DL — HIGH (ref 7–23)
CALCIUM SERPL-MCNC: 9.6 MG/DL — SIGNIFICANT CHANGE UP (ref 8.4–10.5)
CHLORIDE SERPL-SCNC: 98 MMOL/L — SIGNIFICANT CHANGE UP (ref 96–108)
CO2 SERPL-SCNC: 27 MMOL/L — SIGNIFICANT CHANGE UP (ref 22–31)
COLOR FLD: YELLOW — SIGNIFICANT CHANGE UP
CREAT SERPL-MCNC: 1.25 MG/DL — SIGNIFICANT CHANGE UP (ref 0.5–1.3)
EGFR: 44 ML/MIN/1.73M2 — LOW
EOSINOPHIL # FLD: 0 % — SIGNIFICANT CHANGE UP
FLUID INTAKE SUBSTANCE CLASS: SIGNIFICANT CHANGE UP
FOLATE+VIT B12 SERBLD-IMP: 0 % — SIGNIFICANT CHANGE UP
GLUCOSE FLD-MCNC: 161 MG/DL — SIGNIFICANT CHANGE UP
GLUCOSE SERPL-MCNC: 144 MG/DL — HIGH (ref 70–99)
GRAM STN FLD: SIGNIFICANT CHANGE UP
HCT VFR BLD CALC: 40.8 % — SIGNIFICANT CHANGE UP (ref 34.5–45)
HGB BLD-MCNC: 12.4 G/DL — SIGNIFICANT CHANGE UP (ref 11.5–15.5)
INR BLD: 1.13 RATIO — SIGNIFICANT CHANGE UP (ref 0.88–1.16)
LDH SERPL L TO P-CCNC: 222 U/L — SIGNIFICANT CHANGE UP
LDH SERPL L TO P-CCNC: 259 U/L — HIGH (ref 50–242)
LYMPHOCYTES # FLD: 16 % — SIGNIFICANT CHANGE UP
MCHC RBC-ENTMCNC: 25.9 PG — LOW (ref 27–34)
MCHC RBC-ENTMCNC: 30.4 GM/DL — LOW (ref 32–36)
MCV RBC AUTO: 85.4 FL — SIGNIFICANT CHANGE UP (ref 80–100)
MESOTHL CELL # FLD: 6 % — SIGNIFICANT CHANGE UP
MONOS+MACROS # FLD: 68 % — SIGNIFICANT CHANGE UP
NEUTROPHILS-BODY FLUID: 10 % — SIGNIFICANT CHANGE UP
NIGHT BLUE STAIN TISS: SIGNIFICANT CHANGE UP
NRBC # BLD: 0 /100 WBCS — SIGNIFICANT CHANGE UP (ref 0–0)
NRBC # FLD: 0 % — SIGNIFICANT CHANGE UP
OTHER CELLS FLD MANUAL: 0 % — SIGNIFICANT CHANGE UP
PLATELET # BLD AUTO: 265 K/UL — SIGNIFICANT CHANGE UP (ref 150–400)
POTASSIUM SERPL-MCNC: 5.3 MMOL/L — SIGNIFICANT CHANGE UP (ref 3.5–5.3)
POTASSIUM SERPL-SCNC: 5.3 MMOL/L — SIGNIFICANT CHANGE UP (ref 3.5–5.3)
PROT FLD-MCNC: 4.9 G/DL — SIGNIFICANT CHANGE UP
PROT SERPL-MCNC: 7.8 G/DL — SIGNIFICANT CHANGE UP (ref 6–8.3)
PROTHROM AB SERPL-ACNC: 13.4 SEC — SIGNIFICANT CHANGE UP (ref 10.5–13.4)
RBC # BLD: 4.78 M/UL — SIGNIFICANT CHANGE UP (ref 3.8–5.2)
RBC # FLD: 19.5 % — HIGH (ref 10.3–14.5)
RCV VOL RI: 2000 /UL — HIGH (ref 0–0)
SARS-COV-2 RNA SPEC QL NAA+PROBE: SIGNIFICANT CHANGE UP
SODIUM SERPL-SCNC: 136 MMOL/L — SIGNIFICANT CHANGE UP (ref 135–145)
SPECIMEN SOURCE: SIGNIFICANT CHANGE UP
SPECIMEN SOURCE: SIGNIFICANT CHANGE UP
TOTAL NUCLEATED CELL COUNT, BODY FLUID: 697 /UL — SIGNIFICANT CHANGE UP
TUBE TYPE: SIGNIFICANT CHANGE UP
WBC # BLD: 7.9 K/UL — SIGNIFICANT CHANGE UP (ref 3.8–10.5)
WBC # FLD AUTO: 7.9 K/UL — SIGNIFICANT CHANGE UP (ref 3.8–10.5)

## 2022-06-08 PROCEDURE — 88305 TISSUE EXAM BY PATHOLOGIST: CPT | Mod: 26

## 2022-06-08 PROCEDURE — 32555 ASPIRATE PLEURA W/ IMAGING: CPT | Mod: RT

## 2022-06-08 PROCEDURE — 71045 X-RAY EXAM CHEST 1 VIEW: CPT | Mod: 26

## 2022-06-08 PROCEDURE — 88108 CYTOPATH CONCENTRATE TECH: CPT | Mod: 26

## 2022-06-08 PROCEDURE — 99233 SBSQ HOSP IP/OBS HIGH 50: CPT

## 2022-06-08 RX ORDER — HEPARIN SODIUM 5000 [USP'U]/ML
5000 INJECTION INTRAVENOUS; SUBCUTANEOUS EVERY 12 HOURS
Refills: 0 | Status: DISCONTINUED | OUTPATIENT
Start: 2022-06-08 | End: 2022-06-10

## 2022-06-08 RX ORDER — FUROSEMIDE 40 MG
20 TABLET ORAL DAILY
Refills: 0 | Status: DISCONTINUED | OUTPATIENT
Start: 2022-06-09 | End: 2022-06-10

## 2022-06-08 RX ADMIN — PIPERACILLIN AND TAZOBACTAM 25 GRAM(S): 4; .5 INJECTION, POWDER, LYOPHILIZED, FOR SOLUTION INTRAVENOUS at 00:34

## 2022-06-08 RX ADMIN — Medication 2 MILLIGRAM(S): at 01:31

## 2022-06-08 RX ADMIN — Medication 2 MILLIGRAM(S): at 06:54

## 2022-06-08 RX ADMIN — HEPARIN SODIUM 5000 UNIT(S): 5000 INJECTION INTRAVENOUS; SUBCUTANEOUS at 19:51

## 2022-06-08 RX ADMIN — Medication 1 TABLET(S): at 11:48

## 2022-06-08 RX ADMIN — SIMETHICONE 80 MILLIGRAM(S): 80 TABLET, CHEWABLE ORAL at 23:39

## 2022-06-08 RX ADMIN — CHLORHEXIDINE GLUCONATE 15 MILLILITER(S): 213 SOLUTION TOPICAL at 06:56

## 2022-06-08 RX ADMIN — Medication 2 MILLIGRAM(S): at 19:50

## 2022-06-08 RX ADMIN — NYSTATIN CREAM 1 APPLICATION(S): 100000 CREAM TOPICAL at 18:15

## 2022-06-08 RX ADMIN — PANTOPRAZOLE SODIUM 40 MILLIGRAM(S): 20 TABLET, DELAYED RELEASE ORAL at 11:49

## 2022-06-08 RX ADMIN — PIPERACILLIN AND TAZOBACTAM 25 GRAM(S): 4; .5 INJECTION, POWDER, LYOPHILIZED, FOR SOLUTION INTRAVENOUS at 08:54

## 2022-06-08 RX ADMIN — Medication 40 MILLIGRAM(S): at 06:57

## 2022-06-08 RX ADMIN — Medication 1 TABLET(S): at 11:49

## 2022-06-08 RX ADMIN — METHOCARBAMOL 500 MILLIGRAM(S): 500 TABLET, FILM COATED ORAL at 06:56

## 2022-06-08 RX ADMIN — NYSTATIN CREAM 1 APPLICATION(S): 100000 CREAM TOPICAL at 06:57

## 2022-06-08 RX ADMIN — Medication 2 MILLIGRAM(S): at 23:39

## 2022-06-08 RX ADMIN — ERTAPENEM SODIUM 120 MILLIGRAM(S): 1 INJECTION, POWDER, LYOPHILIZED, FOR SOLUTION INTRAMUSCULAR; INTRAVENOUS at 08:21

## 2022-06-08 RX ADMIN — Medication 4: at 23:45

## 2022-06-08 RX ADMIN — Medication 1 MILLIGRAM(S): at 11:49

## 2022-06-08 RX ADMIN — CHLORHEXIDINE GLUCONATE 15 MILLILITER(S): 213 SOLUTION TOPICAL at 17:28

## 2022-06-08 RX ADMIN — Medication 1 APPLICATION(S): at 11:50

## 2022-06-08 RX ADMIN — Medication 2: at 17:23

## 2022-06-08 RX ADMIN — SIMETHICONE 80 MILLIGRAM(S): 80 TABLET, CHEWABLE ORAL at 17:28

## 2022-06-08 RX ADMIN — SIMETHICONE 80 MILLIGRAM(S): 80 TABLET, CHEWABLE ORAL at 11:49

## 2022-06-08 RX ADMIN — METHOCARBAMOL 500 MILLIGRAM(S): 500 TABLET, FILM COATED ORAL at 17:29

## 2022-06-08 RX ADMIN — Medication 2 MILLIGRAM(S): at 15:35

## 2022-06-08 RX ADMIN — Medication 2: at 12:07

## 2022-06-08 RX ADMIN — Medication 2 MILLIGRAM(S): at 10:45

## 2022-06-08 RX ADMIN — PIPERACILLIN AND TAZOBACTAM 25 GRAM(S): 4; .5 INJECTION, POWDER, LYOPHILIZED, FOR SOLUTION INTRAVENOUS at 17:29

## 2022-06-08 RX ADMIN — SIMETHICONE 80 MILLIGRAM(S): 80 TABLET, CHEWABLE ORAL at 00:34

## 2022-06-08 RX ADMIN — SIMETHICONE 80 MILLIGRAM(S): 80 TABLET, CHEWABLE ORAL at 06:56

## 2022-06-08 RX ADMIN — SENNA PLUS 3 TABLET(S): 8.6 TABLET ORAL at 21:37

## 2022-06-08 RX ADMIN — Medication 1 APPLICATION(S): at 11:49

## 2022-06-08 NOTE — DISCHARGE NOTE NURSING/CASE MANAGEMENT/SOCIAL WORK - PATIENT PORTAL LINK FT
You can access the FollowMyHealth Patient Portal offered by [facility] by registering at the following website: [email or web address]. By joining [facility]’s FollowMyHealth portal, you will also be able to view your health information using other applications (apps) compatible with our system.

## 2022-06-08 NOTE — PROCEDURE NOTE - GENERAL PROCEDURE NAME
Patient Information     Patient Name MRN Sex Juliette Kelley [id number] Female 1953      Nursing Note by Ranjana Delong at 10/23/2017  1:00 PM     Author:  Ranjana Delong Service:  (none) Author Type:  (none)     Filed:  10/23/2017  1:33 PM Encounter Date:  10/23/2017 Status:  Signed     :  Ranjana Delong            Juliette Colin is a 64 y.o. female presenting for a physical. She has been having urination issues. She has urinary frequency, but no pain or burning.  Ranjana Delong LPN 10/23/2017 1:23 PM          Thoracentesis

## 2022-06-08 NOTE — PROGRESS NOTE ADULT - ASSESSMENT
The patient is a 76 year old female with a history of HTN, DM, CVA, dementia, chronic respiratory failure s/p trach, PEG, cardiac arrest, anemia who presents with respiratory distress.    Plan:  - Echo 5/30/22 with normal LV systolic function, mod pulm HTN  - Cardiac enzymes negative  - CT chest with moderate bilateral pleural effusions  - Pleural effusions last admission consistent with exudate  - IV diuretics unlikely to help with exudative pleural effusions  - On ertapenem  - Pulm and ID follow-up

## 2022-06-08 NOTE — PROGRESS NOTE ADULT - SUBJECTIVE AND OBJECTIVE BOX
[facility]EC[facility]    [facility]ergies    codeine (Hives)    Intolerances        PAST MEDICAL & SURGICAL HISTORY:  Dementia of frontal lobe type      Aphasic stroke      Diabetes mellitus      Respiratory failure      Hypertension      GERD (gastroesophageal reflux disease)      Constipation      Respiratory failure      CVA (cerebral vascular accident)      HTN (hypertension)      DM (diabetes mellitus)      Advanced dementia      COVID-19 virus detected      Quadriplegia      Pneumonia      Type II diabetes mellitus      Hx of appendectomy      Gastrostomy in place      Tracheostomy in place      Tracheostomy tube present      Feeding by G-tube          FAMILY HISTORY:  No pertinent family history in first degree relatives        Home Medications:  albuterol 90 mcg/inh inhalation aerosol with adapter: 2  inhaled every 6 hours (21 May 2022 21:16)  Bacid (LAC) oral tablet: 2 tab(s) by gastrostomy tube once a day (21 May 2022 21:16)  Betadine 10% topical swab: cleanse right and left great toes (21 May 2022 21:16)  Carafate 1 g/10 mL oral suspension: 10 milliliter(s) by gastrostomy tube 4 times a day (before meals and at bedtime) for 14 days (Started 6/4/21) (21 May 2022 21:16)  chlorhexidine 0.12% mucous membrane liquid: 15 milliliter(s) mucous membrane 2 times a day (21 May 2022 21:16)  Eucerin topical cream: Apply topically to affected area once a day bilateral feet (21 May 2022 21:16)  folic acid 1 mg oral tablet: 1 tab(s) orally once a day (21 May 2022 21:16)  insulin glargine 100 units/mL subcutaneous solution: 8 unit(s) subcutaneous once a day (in the morning) (01 Jun 2022 08:24)  ipratropium-albuterol 0.5 mg-2.5 mg/3 mL inhalation solution: 3 milliliter(s) inhaled 4 times a day (21 May 2022 21:16)  LORazepam 1 mg oral tablet: 1 tab(s) by gastrostomy tube every 4 hours (21 May 2022 21:16)  methocarbamol 500 mg oral tablet: 1 tab(s) by gastrostomy tube 2 times a day (21 May 2022 21:16)  MiraLax oral powder for reconstitution:  (21 May 2022 23:14)  Multiple Vitamins oral tablet: 1 tab(s) orally once a day (21 May 2022 21:16)  nystatin 100,000 units/g topical powder: 1 application topically 3 times a day (29 May 2022 16:35)  omeprazole 20 mg oral delayed release capsule: orally 2 times a day (21 May 2022 23:14)  polyethylene glycol 3350 oral powder for reconstitution: 17 gram(s) by gastrostomy tube every 12 hours (21 May 2022 21:16)  senna 8.6 mg oral tablet: 3 tab(s) by gastrostomy tube once a day (at bedtime) (21 May 2022 21:16)  simethicone 80 mg oral tablet, chewable: 1 tab(s) by gastrostomy tube every 6 hours (21 May 2022 21:16)  Tylenol 325 mg oral tablet: 2 tab(s) by gastrostomy tube once a day; 60 minutes prior to dressing change  (21 May 2022 21:16)  Tylenol 325 mg oral tablet: 2 tab(s) by gastrostomy tube every 6 hours, As Needed (21 May 2022 21:16)      MEDICATIONS  (STANDING):  chlorhexidine 0.12% Liquid 15 milliLiter(s) Oral Mucosa every 12 hours  collagenase Ointment 1 Application(s) Topical daily  dextrose 5%. 1000 milliLiter(s) (100 mL/Hr) IV Continuous <Continuous>  dextrose 5%. 1000 milliLiter(s) (50 mL/Hr) IV Continuous <Continuous>  dextrose 50% Injectable 25 Gram(s) IV Push once  dextrose 50% Injectable 12.5 Gram(s) IV Push once  dextrose 50% Injectable 25 Gram(s) IV Push once  ertapenem  IVPB 1000 milliGRAM(s) IV Intermittent every 24 hours  ertapenem  IVPB      folic acid 1 milliGRAM(s) Oral daily  furosemide   Injectable 40 milliGRAM(s) IV Push daily  glucagon  Injectable 1 milliGRAM(s) IntraMuscular once  insulin glargine Injectable (LANTUS) 8 Unit(s) SubCutaneous every morning  insulin lispro (ADMELOG) corrective regimen sliding scale   SubCutaneous every 6 hours  lactobacillus acidophilus 1 Tablet(s) Oral daily  LORazepam     Tablet 2 milliGRAM(s) Oral every 4 hours  methocarbamol 500 milliGRAM(s) Oral two times a day  multivitamin 1 Tablet(s) Oral daily  nystatin Powder 1 Application(s) Topical every 12 hours  pantoprazole  Injectable 40 milliGRAM(s) IV Push daily  piperacillin/tazobactam IVPB.. 3.375 Gram(s) IV Intermittent every 8 hours  povidone iodine 10% Solution 1 Application(s) Topical daily  senna 3 Tablet(s) Oral at bedtime  simethicone 80 milliGRAM(s) Chew every 6 hours    MEDICATIONS  (PRN):  acetaminophen    Suspension .. 650 milliGRAM(s) Oral every 6 hours PRN Temp greater or equal to 38C (100.4F), Mild Pain (1 - 3)  albuterol/ipratropium for Nebulization 3 milliLiter(s) Nebulizer every 6 hours PRN Shortness of Breath and/or Wheezing  dextrose Oral Gel 15 Gram(s) Oral once PRN Blood Glucose LESS THAN 70 milliGRAM(s)/deciliter  LORazepam   Injectable 2 milliGRAM(s) IV Push every 4 hours PRN Agitation      Diet, NPO with Tube Feed:   Tube Feeding Modality: Gastrostomy  Glucerna 1.5 Horacio  Total Volume for 24 Hours (mL): 1200  Continuous  Until Goal Tube Feed Rate (mL per Hour): 60  Tube Feed Duration (in Hours): 20  Tube Feed Start Time: 00:00  Free Water Flush  Pump   Rate (mL per Hour): 25   Frequency: Every Hour    Duration (Hours): 24 (06-02-22 @ 11:46) [Active]          Vital Signs Last 24 Hrs  T(C): 37 (08 Jun 2022 03:22), Max: 37.2 (08 Jun 2022 00:05)  T(F): 98.6 (08 Jun 2022 03:22), Max: 98.9 (08 Jun 2022 00:05)  HR: 69 (08 Jun 2022 05:42) (60 - 85)  BP: 117/48 (08 Jun 2022 04:00) (96/54 - 128/45)  BP(mean): 69 (08 Jun 2022 04:00) (67 - 88)  RR: 21 (08 Jun 2022 04:00) (18 - 24)  SpO2: 99% (08 Jun 2022 05:42) (99% - 100%)      06-06-22 @ 07:01  -  06-07-22 @ 07:00  --------------------------------------------------------  IN: 2240 mL / OUT: 0 mL / NET: 2240 mL    06-07-22 @ 07:01  -  06-08-22 @ 06:56  --------------------------------------------------------  IN: 1385 mL / OUT: 0 mL / NET: 1385 mL        Mode: AC/ CMV (Assist Control/ Continuous Mandatory Ventilation), RR (machine): 24, TV (machine): 400, FiO2: 30, PEEP: 5, ITime: 0.9, MAP: 12, PIP: 28      LABS:                        12.4   7.90  )-----------( 265      ( 08 Jun 2022 06:00 )             40.8     06-07    137  |  100  |  43<H>  ----------------------------<  177<H>  4.7   |  28  |  1.30    Ca    9.7      07 Jun 2022 06:00    TPro  8.5<H>  /  Alb  2.3<L>  /  TBili  0.5  /  DBili  x   /  AST  15  /  ALT  17  /  AlkPhos  137<H>  06-07              WBC:  WBC Count: 7.90 K/uL (06-08 @ 06:00)  WBC Count: 5.74 K/uL (06-07 @ 06:00)  WBC Count: 4.98 K/uL (06-06 @ 06:40)  WBC Count: 5.44 K/uL (06-05 @ 06:37)      MICROBIOLOGY:  RECENT CULTURES:  06-03 Catheterized Catheterized XXXX XXXX   <10,000 CFU/mL Normal Urogenital Elvira    06-03 .Blood Blood-Peripheral XXXX XXXX   No growth to date.    06-02 Trach Asp Tracheal Aspirate Serratia marcescens  Pseudomonas aeruginosa (Carbapenem Resistant)   Rare polymorphonuclear leukocytes per low power field  Rare Squamous epithelial cells per low power field  Moderate Gram Negative Rods per oil power field  Moderate Gram Negative Coccobacilli per oil power field   Numerous Serratia marcescens  Moderate Pseudomonas aeruginosa (Carbapenem Resistant)  Normal Respiratory Elvira present                    Sodium:  Sodium, Serum: 137 mmol/L (06-07 @ 06:00)  Sodium, Serum: 142 mmol/L (06-06 @ 06:40)  Sodium, Serum: 143 mmol/L (06-05 @ 06:37)      1.30 mg/dL 06-07 @ 06:00  1.14 mg/dL 06-06 @ 06:40  1.22 mg/dL 06-05 @ 06:37      Hemoglobin:  Hemoglobin: 12.4 g/dL (06-08 @ 06:00)  Hemoglobin: 12.2 g/dL (06-07 @ 06:00)  Hemoglobin: 10.4 g/dL (06-06 @ 06:40)  Hemoglobin: 11.3 g/dL (06-05 @ 06:37)      Platelets: Platelet Count - Automated: 265 K/uL (06-08 @ 06:00)  Platelet Count - Automated: 284 K/uL (06-07 @ 06:00)  Platelet Count - Automated: 252 K/uL (06-06 @ 06:40)  Platelet Count - Automated: 165 K/uL (06-05 @ 06:37)      LIVER FUNCTIONS - ( 07 Jun 2022 06:00 )  Alb: 2.3 g/dL / Pro: 8.5 g/dL / ALK PHOS: 137 U/L / ALT: 17 U/L DA / AST: 15 U/L / GGT: x                 RADIOLOGY & ADDITIONAL STUDIES:      MICROBIOLOGY:  RECENT CULTURES:  06-03 Catheterized Catheterized XXXX XXXX   <10,000 CFU/mL Normal Urogenital Elvira    06-03 .Blood Blood-Peripheral XXXX XXXX   No growth to date.    06-02 Trach Asp Tracheal Aspirate Serratia marcescens  Pseudomonas aeruginosa (Carbapenem Resistant)   Rare polymorphonuclear leukocytes per low power field  Rare Squamous epithelial cells per low power field  Moderate Gram Negative Rods per oil power field  Moderate Gram Negative Coccobacilli per oil power field   Numerous Serratia marcescens  Moderate Pseudomonas aeruginosa (Carbapenem Resistant)  Normal Respiratory Elvira present

## 2022-06-08 NOTE — PROGRESS NOTE ADULT - ASSESSMENT
This is an 78 y/o F with PMH of HTN, DM type 2, Cardiac Arrest, CVA, COPD, Chronic Respiratory Failure Vent Dependant s/p trach & PEG, Multiple Hospital Admissions for Aspiration & vent associated PNA, COVID-19 Infection, Anemia with GI blood loss, GERD, and Advanced Dementia who was sent from [facility] facility for acute respiratory distress.     Sepsis 2/2 VAP/PNA c/b pleural effusions  Pleural Effusions: parapneumonic vs. empyema  - prior sputum CRE P. aeruginosa and MDRO S. marascens  - 5/23 sputum cx w/ moderate CRE Pseudomonas again and mixed GNR alejandro   - s/p avycaz  x7 day completed 5/28  Pt was discharged on 6/1 and then re-admitted again for recurrent pleural effusions.   Restarted on zosyn.   - 6/2 sputum cx again w/ CRE Pseudomonas and MDRO S. marascens  Reviewed susceptibilities for above organisms w/ micro lab.   Serratia not susceptible to avycaz/zerbaxa  Plan:  C/w zosyn for CRE pseudomonas and anaeorbic coverage in setting of pleural effusions  Adding ertapenem 1gm q24h for serratia coverage  C/w vent management per ICU  Role for thoracentesis vs placement of catheter--per pulm/ICU    Infectious Diseases will continue to follow. Please call with any questions.   Andressa Brantley M.D.  [facility], Division of Infectious Diseases [phone]       This is an 80 y/o F with PMH of HTN, DM type 2, Cardiac Arrest, CVA, COPD, Chronic Respiratory Failure Vent Dependant s/p trach & PEG, Multiple Hospital Admissions for Aspiration & vent associated PNA, COVID-19 Infection, Anemia with GI blood loss, GERD, and Advanced Dementia who was sent from [facility] facility for acute respiratory distress.     Sepsis 2/2 VAP/PNA c/b pleural effusions  Pleural Effusions: parapneumonic vs. empyema  - prior sputum CRE P. aeruginosa and MDRO S. marascens  - 5/23 sputum cx w/ moderate CRE Pseudomonas again and mixed GNR alejandro   - s/p avycaz  x7 day completed 5/28  Pt was discharged on 6/1 and then re-admitted again for recurrent pleural effusions.   Restarted on zosyn.   - 6/2 sputum cx again w/ CRE Pseudomonas and MDRO S. marascens  Reviewed susceptibilities for above organisms w/ micro lab.   Serratia not susceptible to avycaz/zerbaxa  Plan:  C/w zosyn for CRE pseudomonas and anaeorbic coverage in setting of pleural effusions  C/w ertapenem 1gm q24h for serratia coverage  C/w vent management per ICU  Role for thoracentesis vs placement of catheter--per pulm/ICU    Infectious Diseases will continue to follow. Please call with any questions.   Andressa Brantley M.D.  [facility], Division of Infectious Diseases [phone]

## 2022-06-08 NOTE — PROGRESS NOTE ADULT - ASSESSMENT
80 y/o F with PMH of HTN, DM type 2, Cardiac Arrest, CVA, COPD, Chronic Respiratory Failure Vent Dependant s/p trach & PEG, Multiple Hospital Admissions for Aspiration & vent associated PNA, COVID-19 Infection, Anemia with GI blood loss, GERD, and Advanced Dementia presented with acute respiratory distress.      on ABX  on LASIX  GI eval noted  Pulm follow up  I and O  pleurocentesis - pleurx discussion    HCP Marciano -  - pt is full code  s/p emp ABX - broad spectrum for poss PNA  cvs rx regimen  bronchodilators  oral and skin care  assist with needs - ADL  monitor VS and HD  CT imaging reviewed  Old records reviewed  suction PRN  trach care  peg care  nutritional optimization  decubitus prevention

## 2022-06-08 NOTE — PROCEDURE NOTE - PROCEDURE FINDINGS AND DETAILS
385cc of yellow pleuritic fluid removed from the right thorax under sterile conditions and ultrasound guidance.

## 2022-06-08 NOTE — PROGRESS NOTE ADULT - SUBJECTIVE AND OBJECTIVE BOX
Subjective: contracted, trached. FiO2 30%      MEDICATIONS  (STANDING):  chlorhexidine 0.12% Liquid 15 milliLiter(s) Oral Mucosa every 12 hours  collagenase Ointment 1 Application(s) Topical daily  dextrose 5%. 1000 milliLiter(s) (50 mL/Hr) IV Continuous <Continuous>  dextrose 5%. 1000 milliLiter(s) (100 mL/Hr) IV Continuous <Continuous>  dextrose 50% Injectable 25 Gram(s) IV Push once  dextrose 50% Injectable 12.5 Gram(s) IV Push once  dextrose 50% Injectable 25 Gram(s) IV Push once  ertapenem  IVPB 1000 milliGRAM(s) IV Intermittent every 24 hours  ertapenem  IVPB      folic acid 1 milliGRAM(s) Oral daily  furosemide   Injectable 40 milliGRAM(s) IV Push daily  glucagon  Injectable 1 milliGRAM(s) IntraMuscular once  insulin glargine Injectable (LANTUS) 8 Unit(s) SubCutaneous every morning  insulin lispro (ADMELOG) corrective regimen sliding scale   SubCutaneous every 6 hours  lactobacillus acidophilus 1 Tablet(s) Oral daily  LORazepam     Tablet 2 milliGRAM(s) Oral every 4 hours  methocarbamol 500 milliGRAM(s) Oral two times a day  multivitamin 1 Tablet(s) Oral daily  nystatin Powder 1 Application(s) Topical every 12 hours  pantoprazole  Injectable 40 milliGRAM(s) IV Push daily  piperacillin/tazobactam IVPB.. 3.375 Gram(s) IV Intermittent every 8 hours  povidone iodine 10% Solution 1 Application(s) Topical daily  senna 3 Tablet(s) Oral at bedtime  simethicone 80 milliGRAM(s) Chew every 6 hours    MEDICATIONS  (PRN):  acetaminophen    Suspension .. 650 milliGRAM(s) Oral every 6 hours PRN Temp greater or equal to 38C (100.4F), Mild Pain (1 - 3)  albuterol/ipratropium for Nebulization 3 milliLiter(s) Nebulizer every 6 hours PRN Shortness of Breath and/or Wheezing  dextrose Oral Gel 15 Gram(s) Oral once PRN Blood Glucose LESS THAN 70 milliGRAM(s)/deciliter  LORazepam   Injectable 2 milliGRAM(s) IV Push every 4 hours PRN Agitation          T(C): 36.6 (06-08-22 @ 08:48), Max: 37.2 (06-08-22 @ 00:05)  HR: 69 (06-08-22 @ 10:20) (66 - 85)  BP: 91/54 (06-08-22 @ 10:00) (88/53 - 139/78)  RR: 24 (06-08-22 @ 10:00) (18 - 27)  SpO2: 100% (06-08-22 @ 10:20) (97% - 100%)  Wt(kg): --        I&O's Detail    07 Jun 2022 07:01  -  08 Jun 2022 07:00  --------------------------------------------------------  IN:    Free Water: 355 mL    Glucerna 1.5: 780 mL    IV PiggyBack: 250 mL  Total IN: 1385 mL    OUT:  Total OUT: 0 mL    Total NET: 1385 mL          Mode: AC/ CMV (Assist Control/ Continuous Mandatory Ventilation)  RR (machine): 21  TV (machine): 400  FiO2: 30  PEEP: 5  ITime: 0.9  MAP: 14  PIP: 30       PHYSICAL EXAM:    NECK: trach  CHEST/LUNG: Clear  HEART: S1S2  ABDOMEN: Soft, G-tube  EXTREMITIES:  no edema      LABS:  CBC Full  -  ( 08 Jun 2022 06:00 )  WBC Count : 7.90 K/uL  RBC Count : 4.78 M/uL  Hemoglobin : 12.4 g/dL  Hematocrit : 40.8 %  Platelet Count - Automated : 265 K/uL  Mean Cell Volume : 85.4 fl  Mean Cell Hemoglobin : 25.9 pg  Mean Cell Hemoglobin Concentration : 30.4 gm/dL  Auto Neutrophil # : x  Auto Lymphocyte # : x  Auto Monocyte # : x  Auto Eosinophil # : x  Auto Basophil # : x  Auto Neutrophil % : x  Auto Lymphocyte % : x  Auto Monocyte % : x  Auto Eosinophil % : x  Auto Basophil % : x    06-08    136  |  98  |  45<H>  ----------------------------<  144<H>  5.3   |  27  |  1.25    Ca    9.6      08 Jun 2022 06:00    TPro  7.8  /  Alb  2.5<L>  /  TBili  0.6  /  DBili  x   /  AST  14  /  ALT  17  /  AlkPhos  144<H>  06-08    PT/INR - ( 08 Jun 2022 06:00 )   PT: 13.4 sec;   INR: 1.13 ratio             Impression:  * CKD3. Mild pre-renal flux.   * HyperK -- mild  * PNA, pl eff  * Chronic vent dependent resp failure    Recommendations:  * Stable from renal POV  * Monitor Cr  * Low K TFs

## 2022-06-08 NOTE — DISCHARGE NOTE NURSING/CASE MANAGEMENT/SOCIAL WORK - NSDCPEFALRISK_GEN_ALL_CORE
For information on Fall & Injury Prevention, visit: [email or web address] OR  [email or web address] OR  https://www.cdc.gov/steadi/patient.html

## 2022-06-08 NOTE — PROGRESS NOTE ADULT - SUBJECTIVE AND OBJECTIVE BOX
Patient is a 76y old  Female who presents with a chief complaint of Respiratory distress. (08 Jun 2022 12:15)      INTERVAL HPI/OVERNIGHT EVENTS: Patient seen and examined at bedside. No overnight events. S/p thoracentesis today    MEDICATIONS  (STANDING):  chlorhexidine 0.12% Liquid 15 milliLiter(s) Oral Mucosa every 12 hours  collagenase Ointment 1 Application(s) Topical daily  dextrose 5%. 1000 milliLiter(s) (50 mL/Hr) IV Continuous <Continuous>  dextrose 5%. 1000 milliLiter(s) (100 mL/Hr) IV Continuous <Continuous>  dextrose 50% Injectable 25 Gram(s) IV Push once  dextrose 50% Injectable 12.5 Gram(s) IV Push once  dextrose 50% Injectable 25 Gram(s) IV Push once  ertapenem  IVPB 1000 milliGRAM(s) IV Intermittent every 24 hours  ertapenem  IVPB      folic acid 1 milliGRAM(s) Oral daily  furosemide   Injectable 40 milliGRAM(s) IV Push daily  glucagon  Injectable 1 milliGRAM(s) IntraMuscular once  insulin glargine Injectable (LANTUS) 8 Unit(s) SubCutaneous every morning  insulin lispro (ADMELOG) corrective regimen sliding scale   SubCutaneous every 6 hours  lactobacillus acidophilus 1 Tablet(s) Oral daily  LORazepam     Tablet 2 milliGRAM(s) Oral every 4 hours  methocarbamol 500 milliGRAM(s) Oral two times a day  multivitamin 1 Tablet(s) Oral daily  nystatin Powder 1 Application(s) Topical every 12 hours  pantoprazole  Injectable 40 milliGRAM(s) IV Push daily  piperacillin/tazobactam IVPB.. 3.375 Gram(s) IV Intermittent every 8 hours  povidone iodine 10% Solution 1 Application(s) Topical daily  senna 3 Tablet(s) Oral at bedtime  simethicone 80 milliGRAM(s) Chew every 6 hours    MEDICATIONS  (PRN):  acetaminophen    Suspension .. 650 milliGRAM(s) Oral every 6 hours PRN Temp greater or equal to 38C (100.4F), Mild Pain (1 - 3)  albuterol/ipratropium for Nebulization 3 milliLiter(s) Nebulizer every 6 hours PRN Shortness of Breath and/or Wheezing  dextrose Oral Gel 15 Gram(s) Oral once PRN Blood Glucose LESS THAN 70 milliGRAM(s)/deciliter  LORazepam   Injectable 2 milliGRAM(s) IV Push every 4 hours PRN Agitation      Allergies    codeine (Hives)    Intolerances        REVIEW OF SYSTEMS:  Unable to obtain meaningful ROS due to mental status      Vital Signs Last 24 Hrs  T(C): 36.4 (08 Jun 2022 12:53), Max: 37.2 (08 Jun 2022 00:05)  T(F): 97.6 (08 Jun 2022 12:53), Max: 98.9 (08 Jun 2022 00:05)  HR: 75 (08 Jun 2022 12:00) (66 - 85)  BP: 112/65 (08 Jun 2022 12:00) (88/53 - 139/78)  BP(mean): 78 (08 Jun 2022 12:00) (65 - 97)  RR: 24 (08 Jun 2022 12:00) (18 - 27)  SpO2: 100% (08 Jun 2022 12:00) (97% - 100%)    PHYSICAL EXAM:  GENERAL: chronically ill appearing, emaciated, NAD, obtunded  HEAD:  atraumatic  EYES: conjunctiva clear  NECK: (+)trach in place, clean  RESPIRATORY: mechanical breath sounds, diminished, vent in place, no gross crackles or rales  CARDIOVASCULAR:  regular rate and rhythm, no murmurs or rubs or gallops, 2+ peripheral pulses  GASTROINTESTINAL:  soft, +PEG in place, clean and dry as well, nondistended, bowel sounds present  EXTREMITIES: no clubbing or cyanosis or edema  MUSCULOSKELETAL:  (+)diffuse contractures in all joints  NERVOUS SYSTEM: does not respond to pain    LABS:                        12.4   7.90  )-----------( 265      ( 08 Jun 2022 06:00 )             40.8     CBC Full  -  ( 08 Jun 2022 06:00 )  WBC Count : 7.90 K/uL  Hemoglobin : 12.4 g/dL  Hematocrit : 40.8 %  Platelet Count - Automated : 265 K/uL  Mean Cell Volume : 85.4 fl  Mean Cell Hemoglobin : 25.9 pg  Mean Cell Hemoglobin Concentration : 30.4 gm/dL  Auto Neutrophil # : x  Auto Lymphocyte # : x  Auto Monocyte # : x  Auto Eosinophil # : x  Auto Basophil # : x  Auto Neutrophil % : x  Auto Lymphocyte % : x  Auto Monocyte % : x  Auto Eosinophil % : x  Auto Basophil % : x    08 Jun 2022 06:00    136    |  98     |  45     ----------------------------<  144    5.3     |  27     |  1.25     Ca    9.6        08 Jun 2022 06:00    TPro  7.8    /  Alb  2.5    /  TBili  0.6    /  DBili  x      /  AST  14     /  ALT  17     /  AlkPhos  144    08 Jun 2022 06:00    PT/INR - ( 08 Jun 2022 06:00 )   PT: 13.4 sec;   INR: 1.13 ratio             CAPILLARY BLOOD GLUCOSE      POCT Blood Glucose.: 198 mg/dL (08 Jun 2022 11:52)  POCT Blood Glucose.: 127 mg/dL (08 Jun 2022 06:26)  POCT Blood Glucose.: 182 mg/dL (08 Jun 2022 00:33)  POCT Blood Glucose.: 171 mg/dL (07 Jun 2022 17:22)        Culture - Urine (collected 06-03-22 @ 21:53)  Source: Catheterized Catheterized  Final Report (06-05-22 @ 17:10):    <10,000 CFU/mL Normal Urogenital Elvira    Culture - Blood (collected 06-03-22 @ 18:12)  Source: .Blood Blood-Peripheral  Preliminary Report (06-05-22 @ 01:01):    No growth to date.    Culture - Blood (collected 06-03-22 @ 18:12)  Source: .Blood Blood-Peripheral  Preliminary Report (06-05-22 @ 01:01):    No growth to date.    Culture - Sputum (collected 06-02-22 @ 14:57)  Source: Trach Asp Tracheal Aspirate  Gram Stain (06-02-22 @ 23:35):    Rare polymorphonuclear leukocytes per low power field    Rare Squamous epithelial cells per low power field    Moderate Gram Negative Rods per oil power field    Moderate Gram Negative Coccobacilli per oil power field  Final Report (06-05-22 @ 17:27):    Numerous Serratia marcescens    Moderate Pseudomonas aeruginosa (Carbapenem Resistant)    Normal Respiratory Elvira present  Organism: Pseudomonas aeruginosa (Carbapenem Resistant) (06-07-22 @ 09:35)      -  Amikacin: S <=16      -  Aztreonam: S <=4      -  Cefepime: S 4      -  Ceftazidime: S 4      -  Ceftazidime/Avibactam: S <=4      -  Ceftolozane/tazobactam: S <=2      -  Ciprofloxacin: S <=0.25      -  Gentamicin: S <=2      -  Imipenem: R >8      -  Levofloxacin: S <=0.5      -  Meropenem: R 8      -  Piperacillin/Tazobactam: S <=8      -  Tobramycin: S <=2      Method Type: PRATIK  Organism: Serratia marcescens (06-07-22 @ 09:35)      -  Amikacin: S <=16      -  Amoxicillin/Clavulanic Acid: R >16/8      -  Ampicillin: R >16 These ampicillin results predict results for amoxicillin      -  Ampicillin/Sulbactam: R >16/8 Enterobacter, Klebsiella aerogenes, Citrobacter, and Serratia may develop resistance during prolonged therapy (3-4 days)      -  Aztreonam: R >16      -  Cefazolin: R >16 Enterobacter, Klebsiella aerogenes, Citrobacter, and Serratia may develop resistance during prolonged therapy (3-4 days)      -  Cefepime: R >16      -  Cefoxitin: R >16      -  Ceftazidime/Avibactam: R >16      -  Ceftolozane/tazobactam: R >8      -  Ceftriaxone: R >32 Enterobacter, Klebsiella aerogenes, Citrobacter, and Serratia may develop resistance during prolonged therapy      -  Ciprofloxacin: R >2      -  Ertapenem: S <=0.5      -  Gentamicin: S <=2      -  Levofloxacin: R 2      -  Meropenem: S <=1      -  Piperacillin/Tazobactam: I 32      -  Tobramycin: S 4      -  Trimethoprim/Sulfamethoxazole: S <=0.5/9.5      Method Type: PRATIK  Organism: Serratia marcescens  Pseudomonas aeruginosa (Carbapenem Resistant) (06-05-22 @ 17:27)        RADIOLOGY & ADDITIONAL TESTS:     Consultant(s) Notes Reviewed:  [x] YES  [ ] NO    Care Discussed with [x] Consultants  [x] Patient  [ ] Family  [ ]      [ x] Other; RN  DVT ppx

## 2022-06-08 NOTE — PROGRESS NOTE ADULT - SUBJECTIVE AND OBJECTIVE BOX
Date/Time Patient Seen:  		  Referring MD:   Data Reviewed	       Patient is a 76y old  Female who presents with a chief complaint of Respiratory distress. (07 Jun 2022 21:30)      Subjective/HPI     PAST MEDICAL & SURGICAL HISTORY:  Dementia of frontal lobe type    Aphasic stroke    Diabetes mellitus    Respiratory failure    Hypertension    GERD (gastroesophageal reflux disease)    Constipation    Respiratory failure    CVA (cerebral vascular accident)    HTN (hypertension)    DM (diabetes mellitus)    Advanced dementia    COVID-19 virus detected    Quadriplegia    Pneumonia    Type II diabetes mellitus    Hx of appendectomy    Gastrostomy in place    Tracheostomy in place    Tracheostomy tube present    Feeding by G-tube          Medication list         MEDICATIONS  (STANDING):  chlorhexidine 0.12% Liquid 15 milliLiter(s) Oral Mucosa every 12 hours  collagenase Ointment 1 Application(s) Topical daily  dextrose 5%. 1000 milliLiter(s) (100 mL/Hr) IV Continuous <Continuous>  dextrose 5%. 1000 milliLiter(s) (50 mL/Hr) IV Continuous <Continuous>  dextrose 50% Injectable 25 Gram(s) IV Push once  dextrose 50% Injectable 12.5 Gram(s) IV Push once  dextrose 50% Injectable 25 Gram(s) IV Push once  ertapenem  IVPB 1000 milliGRAM(s) IV Intermittent every 24 hours  ertapenem  IVPB      folic acid 1 milliGRAM(s) Oral daily  furosemide   Injectable 40 milliGRAM(s) IV Push daily  glucagon  Injectable 1 milliGRAM(s) IntraMuscular once  insulin glargine Injectable (LANTUS) 8 Unit(s) SubCutaneous every morning  insulin lispro (ADMELOG) corrective regimen sliding scale   SubCutaneous every 6 hours  lactobacillus acidophilus 1 Tablet(s) Oral daily  LORazepam     Tablet 2 milliGRAM(s) Oral every 4 hours  methocarbamol 500 milliGRAM(s) Oral two times a day  multivitamin 1 Tablet(s) Oral daily  nystatin Powder 1 Application(s) Topical every 12 hours  pantoprazole  Injectable 40 milliGRAM(s) IV Push daily  piperacillin/tazobactam IVPB.. 3.375 Gram(s) IV Intermittent every 8 hours  povidone iodine 10% Solution 1 Application(s) Topical daily  senna 3 Tablet(s) Oral at bedtime  simethicone 80 milliGRAM(s) Chew every 6 hours    MEDICATIONS  (PRN):  acetaminophen    Suspension .. 650 milliGRAM(s) Oral every 6 hours PRN Temp greater or equal to 38C (100.4F), Mild Pain (1 - 3)  albuterol/ipratropium for Nebulization 3 milliLiter(s) Nebulizer every 6 hours PRN Shortness of Breath and/or Wheezing  dextrose Oral Gel 15 Gram(s) Oral once PRN Blood Glucose LESS THAN 70 milliGRAM(s)/deciliter  LORazepam   Injectable 2 milliGRAM(s) IV Push every 4 hours PRN Agitation         Vitals log        ICU Vital Signs Last 24 Hrs  T(C): 37 (08 Jun 2022 03:22), Max: 37.2 (08 Jun 2022 00:05)  T(F): 98.6 (08 Jun 2022 03:22), Max: 98.9 (08 Jun 2022 00:05)  HR: 69 (08 Jun 2022 05:42) (60 - 85)  BP: 117/48 (08 Jun 2022 04:00) (96/54 - 128/45)  BP(mean): 69 (08 Jun 2022 04:00) (67 - 88)  ABP: --  ABP(mean): --  RR: 21 (08 Jun 2022 04:00) (18 - 24)  SpO2: 99% (08 Jun 2022 05:42) (99% - 100%)       Mode: AC/ CMV (Assist Control/ Continuous Mandatory Ventilation)  RR (machine): 24  TV (machine): 400  FiO2: 30  PEEP: 5  ITime: 0.9  MAP: 12  PIP: 28      Input and Output:  I&O's Detail    06 Jun 2022 07:01  -  07 Jun 2022 07:00  --------------------------------------------------------  IN:    Free Water: 600 mL    Glucerna 1.5: 1440 mL    IV PiggyBack: 200 mL  Total IN: 2240 mL    OUT:  Total OUT: 0 mL    Total NET: 2240 mL      07 Jun 2022 07:01  -  08 Jun 2022 06:30  --------------------------------------------------------  IN:    Free Water: 355 mL    Glucerna 1.5: 780 mL    IV PiggyBack: 250 mL  Total IN: 1385 mL    OUT:  Total OUT: 0 mL    Total NET: 1385 mL          Lab Data                        12.2   5.74  )-----------( 284      ( 07 Jun 2022 06:00 )             41.2     06-07    137  |  100  |  43<H>  ----------------------------<  177<H>  4.7   |  28  |  1.30    Ca    9.7      07 Jun 2022 06:00    TPro  8.5<H>  /  Alb  2.3<L>  /  TBili  0.5  /  DBili  x   /  AST  15  /  ALT  17  /  AlkPhos  137<H>  06-07            Review of Systems	      Objective     Physical Examination    heart s1s2  lung dec BS  abd soft      Pertinent Lab findings & Imaging      Annalise:  NO   Adequate UO     I&O's Detail    06 Jun 2022 07:01  -  07 Jun 2022 07:00  --------------------------------------------------------  IN:    Free Water: 600 mL    Glucerna 1.5: 1440 mL    IV PiggyBack: 200 mL  Total IN: 2240 mL    OUT:  Total OUT: 0 mL    Total NET: 2240 mL      07 Jun 2022 07:01  -  08 Jun 2022 06:30  --------------------------------------------------------  IN:    Free Water: 355 mL    Glucerna 1.5: 780 mL    IV PiggyBack: 250 mL  Total IN: 1385 mL    OUT:  Total OUT: 0 mL    Total NET: 1385 mL               Discussed with:     Cultures:	        Radiology

## 2022-06-08 NOTE — PROGRESS NOTE ADULT - ASSESSMENT
79F HTN, DM2, COPD, Chronic Respiratory Failure on Trach to Vent re-admitted for Acute Respiratory Distress    Acute Respiratory Distress  Patient discharged 6/1/22 and repeat CT showing increase pleural effusions; Continue IV Zosyn, Ertapenem added  Disucssed with ID, plan for 5-7 days of Ertapenem. May need midline   Treated with Abx for PNA on prior admission and had Thoracentesis done 1500cc on Left Side   Had thoracentesis today 6/8  TTE- Normal LV, dilated RV, mod pulm htn, small pericardial effusion  Continue Lasix Daily, changed to PO  Pulmonary and Cardiology Input appreciated    Anemia of Chronic Disease with Iron Deficiency  Has been evaluated by GI and Hematology on prior admissions  She has Anemia of Chronic Disease but could also have intermittent GI Bleeding; Occult Blood Negative  S/P 2U PRBC Transfusion and IV Venofer on prior admission  Multivitamin daily    Acute Renal Failure on CKD 3  Baseline Cr around 1.2  Renally dose and monitor BMP and electrolytes     HTN  Hold all BP lower agents    DM2  FS and on ISS to maintain BS <180    Diet  Tube Feeds via PEG  PEG was leaking and replaced on 6/3/22    DVT Prophylaxis  Heparin on hold post procedure, restart per pulm    Disposition  Full Code

## 2022-06-08 NOTE — PROGRESS NOTE ADULT - ASSESSMENT
AGE/SEX.   76 f  DOA.  6/2/2022  CC .  6/2/2022 Resp distress at [facility]  PMH .  [facility] Hosp stay 5/21-6/1/2022 ceftazidime avibactam 1.25x2 5/22- 5/30/2022    [facility]h Hosp stay given zosyn 4/21  pmh Pleural effsn   5/25/2022 1.5 l clear pl effsn removed left side IR  5/25/2022 g 183 l 144/381 .37 p 3.4/5.3 .64 p 23 l 36   5/25/2022l pl fluid  lymph pred exudate   cyto (-)     pmh TRANSFUSION.  5/23/2022 1 u prbc  pmh Pneumonia    pmh HTN,   pmh DM,   pmh CVA,   pmh  chronic respiratory failure   pmh s/p trach, PEG,   pmh cardiac arrest  REVIEW OF SYMPTOMS      Able to give (reliable) ROS  NO    PHYSICAL EXAM    Has trach  peg    HEENT Unremarkable  atraumatic   RESP Fair air entry EXP prolonged    Harsh breath sound Resp distres mild   CARDIAC S1 S2 No S3     NO JVD    ABDOMEN SOFT BS PRESENT NOT DISTENDED No hepatosplenomegaly   PEDAL EDEMA present No calf tenderness  NO rash     MAIN ISSUES .  CHF HFPEF AOC poa 6/1   VDRF pmh   FEVER 6/3/2022  r PL EFFSN Thorac requested 6/7/2022 6/8/2022 r thorac g 161 l 222 p 4.9 p 10 l 16 .38      COVID/ICU/CODE STATUS.                       COVID  STATUS.    6/2/2022 scv2 (-)        ICU STAY. 6/2/2022  GOC.  6/2/2022 full code     BEST PRACTICE ISSUES.                                                  HEAD OF BED ELEVATION. Yes  DVT PROPHYLAXIS.   6/1 hpsc    SQUIRES PROPHYLAXIS. 6/1 protonix 40                                                                                        DIET.  6/2/2022 glucerna 1.5 1440              VITALS/PO/IO/VENT/DRIPS.   6/8/2022 afeb 70 120/60   6/8/2022 24/400/5/.3      PROBLEM DATA/ASSESSMENT RECOMMENDATIONS (A/R).    HEMODYNAMICS.   Monitor and optimize bp   Target MAP to miguel  65 (+)    RESP.   Monitor and optimize  po   Target po to remain 90-95%    ABG.   6/3/2022  6a 24/400/5/.4 750/41/62     PMH/PSH PROBLEMS.  Management continued/modified as indicated    VENT MANAGEMENT.   HOB elevation  Target Pplat 30 (-)  Target PO 90-95%  Target pH 730 (+)  Daily spontaneous breathing trials   Daily sedation vacation       INFECTION SOURCE DD.   INFECTION A/R.   Genna is status post recent course of abio ceftazidime avibactam 1.25x2 5/22- 5/30/2022    6/3 now on zosyn  Trach 6/2 1) Mod gnr 2) Mod gn coccobacilli  SERRATIA CARBAPENEM RESISTANT PSEUDOMONAS   Pt has crp in sputum   will follow with id ama    COPD.  6/1/2022 duoneb.4  PLEURAL EFFUSIONS  ct chest 6/1/2022 Pulm edema Worsening bl effsns likely reflecting progressive chf   Case dw cts Dr JOSE Kellogg and he feels that at this point pleurex is NOT indicated   will dw ir re thoracentesis   6/8/2022 r thorac g 161 l 222 p 4.9 p 10 l 16 .38    await cyto    CHF.  echo 5/30/2022 ef 65% pasp 60 dialted rv   6/2/2022 lasix 40  ANEMIA.  Hb 6/2-6/3/2022 Hb 10.6 - 11.3   RENAL.  Na 6/2-6/3/2022 Na 145 - 144  Cr 6/2-6/3-6/4-6/5-6/7/2022   Cr 1.1- 1.2 - 1.4 - 1.2 -1.3  DM.   6/1 riss   6/1 lantus 8  ANXIETY.  6/2/2022 lorazepam 2.6   DECUB.   6/1 collagenase r gluteal wound       TIME SPENT   Over 39 minutes aggregate critical care time spent on encounter; activities included   direct patient care, counseling and/or coordinating care reviewing notes, lab data/ imaging , discussion with multidisciplinary team/ patient  /family and explaining in detail risks, benefits, alternatives  of the recommendations     CHAPINCITO Cooley f

## 2022-06-08 NOTE — PHARMACOTHERAPY INTERVENTION NOTE - COMMENTS
Antimicrobial Stewardship Program  ASP: restricted antibiotic   CrCl = 31.3ml/min  Ertapenem 1gm IVPB q24h  ID physician on case: Dr. Andressa Brantley

## 2022-06-08 NOTE — DISCHARGE NOTE NURSING/CASE MANAGEMENT/SOCIAL WORK - NSDCVIVACCINE_GEN_ALL_CORE_FT
COVID-19, mRNA, LNP-S, PF, 30 mcg/0.3 mL dose (Pf[employer]); 20-Dec-2021 16:02; Cat Ramirez); [employer]; [id number] (Exp. Date: 30-Dec-2021); IntraMuscular; Deltoid Left.; 0.3 milliLiter(s);

## 2022-06-08 NOTE — PROGRESS NOTE ADULT - SUBJECTIVE AND OBJECTIVE BOX
Chief Complaint: Respiratory distress    Interval Events: No events overnight.    Review of Systems:  Unable to obtain    Physical Exam:  Vital Signs Last 24 Hrs  T(C): 36.6 (08 Jun 2022 08:48), Max: 37.2 (08 Jun 2022 00:05)  T(F): 97.9 (08 Jun 2022 08:48), Max: 98.9 (08 Jun 2022 00:05)  HR: 71 (08 Jun 2022 07:45) (60 - 85)  BP: 117/48 (08 Jun 2022 04:00) (96/54 - 128/45)  BP(mean): 69 (08 Jun 2022 04:00) (67 - 88)  RR: 21 (08 Jun 2022 04:00) (18 - 24)  SpO2: 100% (08 Jun 2022 07:45) (99% - 100%)  General: NAD  HEENT: Trach  Neck: No JVD, no carotid bruit  Lungs: Coarse bilaterally  CV: RRR, nl S1/S2, no M/R/G  Abdomen: S/NT/ND, +BS  Extremities: No LE edema, no cyanosis  Neuro: AAOx0  Skin: No rash    Labs:    06-08    136  |  98  |  45<H>  ----------------------------<  144<H>  5.3   |  27  |  1.25    Ca    9.6      08 Jun 2022 06:00    TPro  7.8  /  Alb  2.5<L>  /  TBili  0.6  /  DBili  x   /  AST  14  /  ALT  17  /  AlkPhos  144<H>  06-08                        12.4   7.90  )-----------( 265      ( 08 Jun 2022 06:00 )             40.8         Telemetry: Sinus rhythm

## 2022-06-09 ENCOUNTER — TRANSCRIPTION ENCOUNTER (OUTPATIENT)
Age: 77
End: 2022-06-09

## 2022-06-09 LAB
ALBUMIN SERPL ELPH-MCNC: 2.5 G/DL — LOW (ref 3.3–5)
ALP SERPL-CCNC: 144 U/L — HIGH (ref 30–120)
ALT FLD-CCNC: 16 U/L DA — SIGNIFICANT CHANGE UP (ref 10–60)
ANION GAP SERPL CALC-SCNC: 10 MMOL/L — SIGNIFICANT CHANGE UP (ref 5–17)
ANION GAP SERPL CALC-SCNC: 11 MMOL/L — SIGNIFICANT CHANGE UP (ref 5–17)
AST SERPL-CCNC: 13 U/L — SIGNIFICANT CHANGE UP (ref 10–40)
BASOPHILS # BLD AUTO: 0.03 K/UL — SIGNIFICANT CHANGE UP (ref 0–0.2)
BASOPHILS NFR BLD AUTO: 0.3 % — SIGNIFICANT CHANGE UP (ref 0–2)
BILIRUB SERPL-MCNC: 0.5 MG/DL — SIGNIFICANT CHANGE UP (ref 0.2–1.2)
BUN SERPL-MCNC: 50 MG/DL — HIGH (ref 7–23)
BUN SERPL-MCNC: 51 MG/DL — HIGH (ref 7–23)
CALCIUM SERPL-MCNC: 9.3 MG/DL — SIGNIFICANT CHANGE UP (ref 8.4–10.5)
CALCIUM SERPL-MCNC: 9.4 MG/DL — SIGNIFICANT CHANGE UP (ref 8.4–10.5)
CHLORIDE SERPL-SCNC: 97 MMOL/L — SIGNIFICANT CHANGE UP (ref 96–108)
CHLORIDE SERPL-SCNC: 98 MMOL/L — SIGNIFICANT CHANGE UP (ref 96–108)
CO2 SERPL-SCNC: 28 MMOL/L — SIGNIFICANT CHANGE UP (ref 22–31)
CO2 SERPL-SCNC: 30 MMOL/L — SIGNIFICANT CHANGE UP (ref 22–31)
CREAT SERPL-MCNC: 1.4 MG/DL — HIGH (ref 0.5–1.3)
CREAT SERPL-MCNC: 1.46 MG/DL — HIGH (ref 0.5–1.3)
CULTURE RESULTS: SIGNIFICANT CHANGE UP
CULTURE RESULTS: SIGNIFICANT CHANGE UP
EGFR: 37 ML/MIN/1.73M2 — LOW
EGFR: 39 ML/MIN/1.73M2 — LOW
EOSINOPHIL # BLD AUTO: 0.06 K/UL — SIGNIFICANT CHANGE UP (ref 0–0.5)
EOSINOPHIL NFR BLD AUTO: 0.5 % — SIGNIFICANT CHANGE UP (ref 0–6)
GLUCOSE SERPL-MCNC: 215 MG/DL — HIGH (ref 70–99)
GLUCOSE SERPL-MCNC: 244 MG/DL — HIGH (ref 70–99)
HCT VFR BLD CALC: 38.4 % — SIGNIFICANT CHANGE UP (ref 34.5–45)
HGB BLD-MCNC: 11.7 G/DL — SIGNIFICANT CHANGE UP (ref 11.5–15.5)
IMM GRANULOCYTES NFR BLD AUTO: 0.4 % — SIGNIFICANT CHANGE UP (ref 0–1.5)
LDH SERPL L TO P-CCNC: 251 U/L — HIGH (ref 50–242)
LYMPHOCYTES # BLD AUTO: 0.81 K/UL — LOW (ref 1–3.3)
LYMPHOCYTES # BLD AUTO: 7 % — LOW (ref 13–44)
MAGNESIUM SERPL-MCNC: 2.4 MG/DL — SIGNIFICANT CHANGE UP (ref 1.6–2.6)
MCHC RBC-ENTMCNC: 26.1 PG — LOW (ref 27–34)
MCHC RBC-ENTMCNC: 30.5 GM/DL — LOW (ref 32–36)
MCV RBC AUTO: 85.5 FL — SIGNIFICANT CHANGE UP (ref 80–100)
MONOCYTES # BLD AUTO: 0.72 K/UL — SIGNIFICANT CHANGE UP (ref 0–0.9)
MONOCYTES NFR BLD AUTO: 6.2 % — SIGNIFICANT CHANGE UP (ref 2–14)
NEUTROPHILS # BLD AUTO: 9.98 K/UL — HIGH (ref 1.8–7.4)
NEUTROPHILS NFR BLD AUTO: 85.6 % — HIGH (ref 43–77)
NRBC # BLD: 0 /100 WBCS — SIGNIFICANT CHANGE UP (ref 0–0)
NT-PROBNP SERPL-SCNC: 893 PG/ML — HIGH (ref 0–450)
PHOSPHATE SERPL-MCNC: 4.5 MG/DL — SIGNIFICANT CHANGE UP (ref 2.5–4.5)
PLATELET # BLD AUTO: 318 K/UL — SIGNIFICANT CHANGE UP (ref 150–400)
POTASSIUM SERPL-MCNC: 4.6 MMOL/L — SIGNIFICANT CHANGE UP (ref 3.5–5.3)
POTASSIUM SERPL-MCNC: 4.7 MMOL/L — SIGNIFICANT CHANGE UP (ref 3.5–5.3)
POTASSIUM SERPL-SCNC: 4.6 MMOL/L — SIGNIFICANT CHANGE UP (ref 3.5–5.3)
POTASSIUM SERPL-SCNC: 4.7 MMOL/L — SIGNIFICANT CHANGE UP (ref 3.5–5.3)
PROT SERPL-MCNC: 7.8 G/DL — SIGNIFICANT CHANGE UP (ref 6–8.3)
RBC # BLD: 4.49 M/UL — SIGNIFICANT CHANGE UP (ref 3.8–5.2)
RBC # FLD: 19.5 % — HIGH (ref 10.3–14.5)
SODIUM SERPL-SCNC: 136 MMOL/L — SIGNIFICANT CHANGE UP (ref 135–145)
SODIUM SERPL-SCNC: 138 MMOL/L — SIGNIFICANT CHANGE UP (ref 135–145)
SPECIMEN SOURCE: SIGNIFICANT CHANGE UP
SPECIMEN SOURCE: SIGNIFICANT CHANGE UP
WBC # BLD: 11.65 K/UL — HIGH (ref 3.8–10.5)
WBC # FLD AUTO: 11.65 K/UL — HIGH (ref 3.8–10.5)

## 2022-06-09 PROCEDURE — 99233 SBSQ HOSP IP/OBS HIGH 50: CPT

## 2022-06-09 PROCEDURE — 71045 X-RAY EXAM CHEST 1 VIEW: CPT | Mod: 26

## 2022-06-09 RX ADMIN — Medication 1 APPLICATION(S): at 11:56

## 2022-06-09 RX ADMIN — Medication 1 APPLICATION(S): at 11:54

## 2022-06-09 RX ADMIN — CHLORHEXIDINE GLUCONATE 15 MILLILITER(S): 213 SOLUTION TOPICAL at 17:30

## 2022-06-09 RX ADMIN — HEPARIN SODIUM 5000 UNIT(S): 5000 INJECTION INTRAVENOUS; SUBCUTANEOUS at 06:31

## 2022-06-09 RX ADMIN — Medication 4: at 06:31

## 2022-06-09 RX ADMIN — INSULIN GLARGINE 8 UNIT(S): 100 INJECTION, SOLUTION SUBCUTANEOUS at 07:58

## 2022-06-09 RX ADMIN — Medication 1 MILLIGRAM(S): at 11:55

## 2022-06-09 RX ADMIN — NYSTATIN CREAM 1 APPLICATION(S): 100000 CREAM TOPICAL at 06:32

## 2022-06-09 RX ADMIN — Medication 2: at 17:55

## 2022-06-09 RX ADMIN — SIMETHICONE 80 MILLIGRAM(S): 80 TABLET, CHEWABLE ORAL at 11:55

## 2022-06-09 RX ADMIN — METHOCARBAMOL 500 MILLIGRAM(S): 500 TABLET, FILM COATED ORAL at 06:31

## 2022-06-09 RX ADMIN — ERTAPENEM SODIUM 120 MILLIGRAM(S): 1 INJECTION, POWDER, LYOPHILIZED, FOR SOLUTION INTRAMUSCULAR; INTRAVENOUS at 08:39

## 2022-06-09 RX ADMIN — Medication 1 TABLET(S): at 11:55

## 2022-06-09 RX ADMIN — METHOCARBAMOL 500 MILLIGRAM(S): 500 TABLET, FILM COATED ORAL at 17:30

## 2022-06-09 RX ADMIN — Medication 20 MILLIGRAM(S): at 06:30

## 2022-06-09 RX ADMIN — HEPARIN SODIUM 5000 UNIT(S): 5000 INJECTION INTRAVENOUS; SUBCUTANEOUS at 17:30

## 2022-06-09 RX ADMIN — Medication 650 MILLIGRAM(S): at 04:48

## 2022-06-09 RX ADMIN — PIPERACILLIN AND TAZOBACTAM 25 GRAM(S): 4; .5 INJECTION, POWDER, LYOPHILIZED, FOR SOLUTION INTRAVENOUS at 09:57

## 2022-06-09 RX ADMIN — Medication 2 MILLIGRAM(S): at 22:35

## 2022-06-09 RX ADMIN — NYSTATIN CREAM 1 APPLICATION(S): 100000 CREAM TOPICAL at 17:31

## 2022-06-09 RX ADMIN — Medication 4: at 11:59

## 2022-06-09 RX ADMIN — CHLORHEXIDINE GLUCONATE 15 MILLILITER(S): 213 SOLUTION TOPICAL at 06:30

## 2022-06-09 RX ADMIN — Medication 2 MILLIGRAM(S): at 09:56

## 2022-06-09 RX ADMIN — PIPERACILLIN AND TAZOBACTAM 25 GRAM(S): 4; .5 INJECTION, POWDER, LYOPHILIZED, FOR SOLUTION INTRAVENOUS at 00:35

## 2022-06-09 RX ADMIN — Medication 2 MILLIGRAM(S): at 06:30

## 2022-06-09 RX ADMIN — PANTOPRAZOLE SODIUM 40 MILLIGRAM(S): 20 TABLET, DELAYED RELEASE ORAL at 11:55

## 2022-06-09 RX ADMIN — Medication 2 MILLIGRAM(S): at 13:57

## 2022-06-09 RX ADMIN — Medication 650 MILLIGRAM(S): at 05:45

## 2022-06-09 RX ADMIN — Medication 2 MILLIGRAM(S): at 17:30

## 2022-06-09 RX ADMIN — Medication 2 MILLIGRAM(S): at 03:13

## 2022-06-09 RX ADMIN — SIMETHICONE 80 MILLIGRAM(S): 80 TABLET, CHEWABLE ORAL at 06:30

## 2022-06-09 RX ADMIN — PIPERACILLIN AND TAZOBACTAM 25 GRAM(S): 4; .5 INJECTION, POWDER, LYOPHILIZED, FOR SOLUTION INTRAVENOUS at 17:29

## 2022-06-09 RX ADMIN — SIMETHICONE 80 MILLIGRAM(S): 80 TABLET, CHEWABLE ORAL at 17:31

## 2022-06-09 NOTE — DISCHARGE NOTE PROVIDER - ATTENDING DISCHARGE PHYSICAL EXAMINATION:
PHYSICAL EXAM:  GENERAL: chronically ill appearing, emaciated, NAD, obtunded  HEAD: atraumatic  EYES: conjunctiva clear  NECK: (+)trach in place, clean  RESPIRATORY: mechanical breath sounds, vent in place, no gross crackles or rales  CARDIOVASCULAR:  regular rate and rhythm, no murmurs or rubs or gallops, 2+ peripheral pulses  GASTROINTESTINAL:  soft, +PEG in place, clean and dry as well, nondistended, bowel sounds present  EXTREMITIES: no clubbing or cyanosis or edema  MUSCULOSKELETAL: (+)diffuse contractures in all joints  NERVOUS SYSTEM: does not respond to pain

## 2022-06-09 NOTE — PROGRESS NOTE ADULT - SUBJECTIVE AND OBJECTIVE BOX
Patient is a 76y old  Female who presents with a chief complaint of Respiratory distress. (09 Jun 2022 10:24)      INTERVAL HPI/OVERNIGHT EVENTS: Patient seen and examined at bedside. No overnight events. Cr trending slightly higher today, Lasix stopped    MEDICATIONS  (STANDING):  chlorhexidine 0.12% Liquid 15 milliLiter(s) Oral Mucosa every 12 hours  collagenase Ointment 1 Application(s) Topical daily  dextrose 5%. 1000 milliLiter(s) (50 mL/Hr) IV Continuous <Continuous>  dextrose 5%. 1000 milliLiter(s) (100 mL/Hr) IV Continuous <Continuous>  dextrose 50% Injectable 25 Gram(s) IV Push once  dextrose 50% Injectable 12.5 Gram(s) IV Push once  dextrose 50% Injectable 25 Gram(s) IV Push once  ertapenem  IVPB 1000 milliGRAM(s) IV Intermittent every 24 hours  ertapenem  IVPB      folic acid 1 milliGRAM(s) Oral daily  furosemide    Tablet 20 milliGRAM(s) Oral daily  glucagon  Injectable 1 milliGRAM(s) IntraMuscular once  heparin   Injectable 5000 Unit(s) SubCutaneous every 12 hours  insulin glargine Injectable (LANTUS) 8 Unit(s) SubCutaneous every morning  insulin lispro (ADMELOG) corrective regimen sliding scale   SubCutaneous every 6 hours  lactobacillus acidophilus 1 Tablet(s) Oral daily  LORazepam     Tablet 2 milliGRAM(s) Oral every 4 hours  methocarbamol 500 milliGRAM(s) Oral two times a day  multivitamin 1 Tablet(s) Oral daily  nystatin Powder 1 Application(s) Topical every 12 hours  pantoprazole  Injectable 40 milliGRAM(s) IV Push daily  piperacillin/tazobactam IVPB.. 3.375 Gram(s) IV Intermittent every 8 hours  povidone iodine 10% Solution 1 Application(s) Topical daily  senna 3 Tablet(s) Oral at bedtime  simethicone 80 milliGRAM(s) Chew every 6 hours    MEDICATIONS  (PRN):  acetaminophen    Suspension .. 650 milliGRAM(s) Oral every 6 hours PRN Temp greater or equal to 38C (100.4F), Mild Pain (1 - 3)  albuterol/ipratropium for Nebulization 3 milliLiter(s) Nebulizer every 6 hours PRN Shortness of Breath and/or Wheezing  dextrose Oral Gel 15 Gram(s) Oral once PRN Blood Glucose LESS THAN 70 milliGRAM(s)/deciliter  LORazepam   Injectable 2 milliGRAM(s) IV Push every 4 hours PRN Agitation      Allergies    codeine (Hives)    Intolerances        REVIEW OF SYSTEMS:  Unable to obtain meaningful ROS due to mental status      Vital Signs Last 24 Hrs  T(C): 36.4 (09 Jun 2022 12:39), Max: 37.6 (09 Jun 2022 04:43)  T(F): 97.6 (09 Jun 2022 12:39), Max: 99.6 (09 Jun 2022 04:43)  HR: 88 (09 Jun 2022 12:00) (64 - 111)  BP: 125/63 (09 Jun 2022 12:00) (89/59 - 129/70)  BP(mean): 81 (09 Jun 2022 12:00) (67 - 88)  RR: 22 (09 Jun 2022 12:00) (21 - 29)  SpO2: 100% (09 Jun 2022 12:00) (99% - 100%)    PHYSICAL EXAM:  GENERAL: chronically ill appearing, emaciated, NAD, obtunded  HEAD: atraumatic  EYES: conjunctiva clear  NECK: (+)trach in place, clean  RESPIRATORY: mechanical breath sounds, diminished, vent in place, no gross crackles or rales  CARDIOVASCULAR:  regular rate and rhythm, no murmurs or rubs or gallops, 2+ peripheral pulses  GASTROINTESTINAL:  soft, +PEG in place, clean and dry as well, nondistended, bowel sounds present  EXTREMITIES: no clubbing or cyanosis or edema  MUSCULOSKELETAL: (+)diffuse contractures in all joints  NERVOUS SYSTEM: does not respond to pain    LABS:                        11.7   11.65 )-----------( 318      ( 09 Jun 2022 06:00 )             38.4     CBC Full  -  ( 09 Jun 2022 06:00 )  WBC Count : 11.65 K/uL  Hemoglobin : 11.7 g/dL  Hematocrit : 38.4 %  Platelet Count - Automated : 318 K/uL  Mean Cell Volume : 85.5 fl  Mean Cell Hemoglobin : 26.1 pg  Mean Cell Hemoglobin Concentration : 30.5 gm/dL  Auto Neutrophil # : 9.98 K/uL  Auto Lymphocyte # : 0.81 K/uL  Auto Monocyte # : 0.72 K/uL  Auto Eosinophil # : 0.06 K/uL  Auto Basophil # : 0.03 K/uL  Auto Neutrophil % : 85.6 %  Auto Lymphocyte % : 7.0 %  Auto Monocyte % : 6.2 %  Auto Eosinophil % : 0.5 %  Auto Basophil % : 0.3 %    09 Jun 2022 12:05    138    |  98     |  50     ----------------------------<  215    4.6     |  30     |  1.40     Ca    9.3        09 Jun 2022 12:05  Phos  4.5       09 Jun 2022 06:00  Mg     2.4       09 Jun 2022 06:00    TPro  7.8    /  Alb  2.5    /  TBili  0.5    /  DBili  x      /  AST  13     /  ALT  16     /  AlkPhos  144    09 Jun 2022 06:00    PT/INR - ( 08 Jun 2022 06:00 )   PT: 13.4 sec;   INR: 1.13 ratio             CAPILLARY BLOOD GLUCOSE      POCT Blood Glucose.: 201 mg/dL (09 Jun 2022 11:58)  POCT Blood Glucose.: 241 mg/dL (09 Jun 2022 06:29)  POCT Blood Glucose.: 211 mg/dL (08 Jun 2022 23:43)  POCT Blood Glucose.: 200 mg/dL (08 Jun 2022 17:19)        Culture - Fungal, Body Fluid (collected 06-08-22 @ 11:41)  Source: .Body Fluid Pleural Fluid  Preliminary Report (06-09-22 @ 11:02):    Testing in progress    Culture - Body Fluid with Gram Stain (collected 06-08-22 @ 11:41)  Source: .Body Fluid Pleural Fluid  Gram Stain (06-08-22 @ 22:22):    polymorphonuclear leukocytes seen    No organisms seen    by cytocentrifuge    Culture - Acid Fast - Body Fluid w/Smear (collected 06-08-22 @ 11:41)  Source: .Body Fluid Pleural Fluid    Culture - Urine (collected 06-03-22 @ 21:53)  Source: Catheterized Catheterized  Final Report (06-05-22 @ 17:10):    <10,000 CFU/mL Normal Urogenital Elvira    Culture - Blood (collected 06-03-22 @ 18:12)  Source: .Blood Blood-Peripheral  Final Report (06-09-22 @ 01:01):    No Growth Final    Culture - Blood (collected 06-03-22 @ 18:12)  Source: .Blood Blood-Peripheral  Final Report (06-09-22 @ 01:01):    No Growth Final    Culture - Sputum (collected 06-02-22 @ 14:57)  Source: Trach Asp Tracheal Aspirate  Gram Stain (06-02-22 @ 23:35):    Rare polymorphonuclear leukocytes per low power field    Rare Squamous epithelial cells per low power field    Moderate Gram Negative Rods per oil power field    Moderate Gram Negative Coccobacilli per oil power field  Final Report (06-05-22 @ 17:27):    Numerous Serratia marcescens    Moderate Pseudomonas aeruginosa (Carbapenem Resistant)    Normal Respiratory Elvira present  Organism: Pseudomonas aeruginosa (Carbapenem Resistant) (06-07-22 @ 09:35)      -  Amikacin: S <=16      -  Aztreonam: S <=4      -  Cefepime: S 4      -  Ceftazidime: S 4      -  Ceftazidime/Avibactam: S <=4      -  Ceftolozane/tazobactam: S <=2      -  Ciprofloxacin: S <=0.25      -  Gentamicin: S <=2      -  Imipenem: R >8      -  Levofloxacin: S <=0.5      -  Meropenem: R 8      -  Piperacillin/Tazobactam: S <=8      -  Tobramycin: S <=2      Method Type: PRATIK  Organism: Serratia marcescens (06-07-22 @ 09:35)      -  Amikacin: S <=16      -  Amoxicillin/Clavulanic Acid: R >16/8      -  Ampicillin: R >16 These ampicillin results predict results for amoxicillin      -  Ampicillin/Sulbactam: R >16/8 Enterobacter, Klebsiella aerogenes, Citrobacter, and Serratia may develop resistance during prolonged therapy (3-4 days)      -  Aztreonam: R >16      -  Cefazolin: R >16 Enterobacter, Klebsiella aerogenes, Citrobacter, and Serratia may develop resistance during prolonged therapy (3-4 days)      -  Cefepime: R >16      -  Cefoxitin: R >16      -  Ceftazidime/Avibactam: R >16      -  Ceftolozane/tazobactam: R >8      -  Ceftriaxone: R >32 Enterobacter, Klebsiella aerogenes, Citrobacter, and Serratia may develop resistance during prolonged therapy      -  Ciprofloxacin: R >2      -  Ertapenem: S <=0.5      -  Gentamicin: S <=2      -  Levofloxacin: R 2      -  Meropenem: S <=1      -  Piperacillin/Tazobactam: I 32      -  Tobramycin: S 4      -  Trimethoprim/Sulfamethoxazole: S <=0.5/9.5      Method Type: PRATIK  Organism: Serratia marcescens  Pseudomonas aeruginosa (Carbapenem Resistant) (06-05-22 @ 17:27)        RADIOLOGY & ADDITIONAL TESTS: < from: Xray Chest 1 View- PORTABLE-Routine (Xray Chest 1 View- PORTABLE-Routine in AM.) (06.09.22 @ 05:46) >  ACC: [id number] EXAM:  XR CHEST PORTABLE ROUTINE 1V                          PROCEDURE DATE:  06/09/2022          INTERPRETATION:  TIME OF EXAM: June 9, 2022 at 5:23 AM.    CLINICAL INFORMATION: Post thoracentesis.    COMPARISON:  June 8, 2022.    TECHNIQUE:   AP Portable chest x-ray.    INTERPRETATION:    The heart is not enlarged. A tracheostomy tube is in place.  Distended air-filled esophagus again seen in the upper chest.  Unchanged small right perihilar opacity.  No right pleural effusion. Small left pleural effusion with likely   associated passive atelectasis, not significantly changed.  No pneumothorax noted.  No acute bony abnormality.      IMPRESSION:  No right pleural effusion seen. Small left pleural effusion   with likely associated passive atelectasis, not significantly changed.    Unchanged small right perihilar opacity, of indeterminate nature.    Tracheostomy tube in place. After distended air-filled esophagus again   seen in the upper chest.    --- End of Report ---            ASHLEY PARSON MD; Attending Radiologist  This document has been electronically signed. Jun 9 2022 10:39AM    < end of copied text >      Consultant(s) Notes Reviewed:  [x] YES  [ ] NO    Care Discussed with [x] Consultants  [x] Patient  [ ] Family  [ ]      [ x] Other; RN  DVT ppx   Patient is a 76y old  Female who presents with a chief complaint of Respiratory distress. (09 Jun 2022 10:24)      INTERVAL HPI/OVERNIGHT EVENTS: Patient seen and examined at bedside. No overnight events. Cr trending slightly higher today, Lasix changed to PO    MEDICATIONS  (STANDING):  chlorhexidine 0.12% Liquid 15 milliLiter(s) Oral Mucosa every 12 hours  collagenase Ointment 1 Application(s) Topical daily  dextrose 5%. 1000 milliLiter(s) (50 mL/Hr) IV Continuous <Continuous>  dextrose 5%. 1000 milliLiter(s) (100 mL/Hr) IV Continuous <Continuous>  dextrose 50% Injectable 25 Gram(s) IV Push once  dextrose 50% Injectable 12.5 Gram(s) IV Push once  dextrose 50% Injectable 25 Gram(s) IV Push once  ertapenem  IVPB 1000 milliGRAM(s) IV Intermittent every 24 hours  ertapenem  IVPB      folic acid 1 milliGRAM(s) Oral daily  furosemide    Tablet 20 milliGRAM(s) Oral daily  glucagon  Injectable 1 milliGRAM(s) IntraMuscular once  heparin   Injectable 5000 Unit(s) SubCutaneous every 12 hours  insulin glargine Injectable (LANTUS) 8 Unit(s) SubCutaneous every morning  insulin lispro (ADMELOG) corrective regimen sliding scale   SubCutaneous every 6 hours  lactobacillus acidophilus 1 Tablet(s) Oral daily  LORazepam     Tablet 2 milliGRAM(s) Oral every 4 hours  methocarbamol 500 milliGRAM(s) Oral two times a day  multivitamin 1 Tablet(s) Oral daily  nystatin Powder 1 Application(s) Topical every 12 hours  pantoprazole  Injectable 40 milliGRAM(s) IV Push daily  piperacillin/tazobactam IVPB.. 3.375 Gram(s) IV Intermittent every 8 hours  povidone iodine 10% Solution 1 Application(s) Topical daily  senna 3 Tablet(s) Oral at bedtime  simethicone 80 milliGRAM(s) Chew every 6 hours    MEDICATIONS  (PRN):  acetaminophen    Suspension .. 650 milliGRAM(s) Oral every 6 hours PRN Temp greater or equal to 38C (100.4F), Mild Pain (1 - 3)  albuterol/ipratropium for Nebulization 3 milliLiter(s) Nebulizer every 6 hours PRN Shortness of Breath and/or Wheezing  dextrose Oral Gel 15 Gram(s) Oral once PRN Blood Glucose LESS THAN 70 milliGRAM(s)/deciliter  LORazepam   Injectable 2 milliGRAM(s) IV Push every 4 hours PRN Agitation      Allergies    codeine (Hives)    Intolerances        REVIEW OF SYSTEMS:  Unable to obtain meaningful ROS due to mental status      Vital Signs Last 24 Hrs  T(C): 36.4 (09 Jun 2022 12:39), Max: 37.6 (09 Jun 2022 04:43)  T(F): 97.6 (09 Jun 2022 12:39), Max: 99.6 (09 Jun 2022 04:43)  HR: 88 (09 Jun 2022 12:00) (64 - 111)  BP: 125/63 (09 Jun 2022 12:00) (89/59 - 129/70)  BP(mean): 81 (09 Jun 2022 12:00) (67 - 88)  RR: 22 (09 Jun 2022 12:00) (21 - 29)  SpO2: 100% (09 Jun 2022 12:00) (99% - 100%)    PHYSICAL EXAM:  GENERAL: chronically ill appearing, emaciated, NAD, obtunded  HEAD: atraumatic  EYES: conjunctiva clear  NECK: (+)trach in place, clean  RESPIRATORY: mechanical breath sounds, diminished, vent in place, no gross crackles or rales  CARDIOVASCULAR:  regular rate and rhythm, no murmurs or rubs or gallops, 2+ peripheral pulses  GASTROINTESTINAL:  soft, +PEG in place, clean and dry as well, nondistended, bowel sounds present  EXTREMITIES: no clubbing or cyanosis or edema  MUSCULOSKELETAL: (+)diffuse contractures in all joints  NERVOUS SYSTEM: does not respond to pain    LABS:                        11.7   11.65 )-----------( 318      ( 09 Jun 2022 06:00 )             38.4     CBC Full  -  ( 09 Jun 2022 06:00 )  WBC Count : 11.65 K/uL  Hemoglobin : 11.7 g/dL  Hematocrit : 38.4 %  Platelet Count - Automated : 318 K/uL  Mean Cell Volume : 85.5 fl  Mean Cell Hemoglobin : 26.1 pg  Mean Cell Hemoglobin Concentration : 30.5 gm/dL  Auto Neutrophil # : 9.98 K/uL  Auto Lymphocyte # : 0.81 K/uL  Auto Monocyte # : 0.72 K/uL  Auto Eosinophil # : 0.06 K/uL  Auto Basophil # : 0.03 K/uL  Auto Neutrophil % : 85.6 %  Auto Lymphocyte % : 7.0 %  Auto Monocyte % : 6.2 %  Auto Eosinophil % : 0.5 %  Auto Basophil % : 0.3 %    09 Jun 2022 12:05    138    |  98     |  50     ----------------------------<  215    4.6     |  30     |  1.40     Ca    9.3        09 Jun 2022 12:05  Phos  4.5       09 Jun 2022 06:00  Mg     2.4       09 Jun 2022 06:00    TPro  7.8    /  Alb  2.5    /  TBili  0.5    /  DBili  x      /  AST  13     /  ALT  16     /  AlkPhos  144    09 Jun 2022 06:00    PT/INR - ( 08 Jun 2022 06:00 )   PT: 13.4 sec;   INR: 1.13 ratio             CAPILLARY BLOOD GLUCOSE      POCT Blood Glucose.: 201 mg/dL (09 Jun 2022 11:58)  POCT Blood Glucose.: 241 mg/dL (09 Jun 2022 06:29)  POCT Blood Glucose.: 211 mg/dL (08 Jun 2022 23:43)  POCT Blood Glucose.: 200 mg/dL (08 Jun 2022 17:19)        Culture - Fungal, Body Fluid (collected 06-08-22 @ 11:41)  Source: .Body Fluid Pleural Fluid  Preliminary Report (06-09-22 @ 11:02):    Testing in progress    Culture - Body Fluid with Gram Stain (collected 06-08-22 @ 11:41)  Source: .Body Fluid Pleural Fluid  Gram Stain (06-08-22 @ 22:22):    polymorphonuclear leukocytes seen    No organisms seen    by cytocentrifuge    Culture - Acid Fast - Body Fluid w/Smear (collected 06-08-22 @ 11:41)  Source: .Body Fluid Pleural Fluid    Culture - Urine (collected 06-03-22 @ 21:53)  Source: Catheterized Catheterized  Final Report (06-05-22 @ 17:10):    <10,000 CFU/mL Normal Urogenital Elvira    Culture - Blood (collected 06-03-22 @ 18:12)  Source: .Blood Blood-Peripheral  Final Report (06-09-22 @ 01:01):    No Growth Final    Culture - Blood (collected 06-03-22 @ 18:12)  Source: .Blood Blood-Peripheral  Final Report (06-09-22 @ 01:01):    No Growth Final    Culture - Sputum (collected 06-02-22 @ 14:57)  Source: Trach Asp Tracheal Aspirate  Gram Stain (06-02-22 @ 23:35):    Rare polymorphonuclear leukocytes per low power field    Rare Squamous epithelial cells per low power field    Moderate Gram Negative Rods per oil power field    Moderate Gram Negative Coccobacilli per oil power field  Final Report (06-05-22 @ 17:27):    Numerous Serratia marcescens    Moderate Pseudomonas aeruginosa (Carbapenem Resistant)    Normal Respiratory Elvira present  Organism: Pseudomonas aeruginosa (Carbapenem Resistant) (06-07-22 @ 09:35)      -  Amikacin: S <=16      -  Aztreonam: S <=4      -  Cefepime: S 4      -  Ceftazidime: S 4      -  Ceftazidime/Avibactam: S <=4      -  Ceftolozane/tazobactam: S <=2      -  Ciprofloxacin: S <=0.25      -  Gentamicin: S <=2      -  Imipenem: R >8      -  Levofloxacin: S <=0.5      -  Meropenem: R 8      -  Piperacillin/Tazobactam: S <=8      -  Tobramycin: S <=2      Method Type: PRATIK  Organism: Serratia marcescens (06-07-22 @ 09:35)      -  Amikacin: S <=16      -  Amoxicillin/Clavulanic Acid: R >16/8      -  Ampicillin: R >16 These ampicillin results predict results for amoxicillin      -  Ampicillin/Sulbactam: R >16/8 Enterobacter, Klebsiella aerogenes, Citrobacter, and Serratia may develop resistance during prolonged therapy (3-4 days)      -  Aztreonam: R >16      -  Cefazolin: R >16 Enterobacter, Klebsiella aerogenes, Citrobacter, and Serratia may develop resistance during prolonged therapy (3-4 days)      -  Cefepime: R >16      -  Cefoxitin: R >16      -  Ceftazidime/Avibactam: R >16      -  Ceftolozane/tazobactam: R >8      -  Ceftriaxone: R >32 Enterobacter, Klebsiella aerogenes, Citrobacter, and Serratia may develop resistance during prolonged therapy      -  Ciprofloxacin: R >2      -  Ertapenem: S <=0.5      -  Gentamicin: S <=2      -  Levofloxacin: R 2      -  Meropenem: S <=1      -  Piperacillin/Tazobactam: I 32      -  Tobramycin: S 4      -  Trimethoprim/Sulfamethoxazole: S <=0.5/9.5      Method Type: PRATIK  Organism: Serratia marcescens  Pseudomonas aeruginosa (Carbapenem Resistant) (06-05-22 @ 17:27)        RADIOLOGY & ADDITIONAL TESTS: < from: Xray Chest 1 View- PORTABLE-Routine (Xray Chest 1 View- PORTABLE-Routine in AM.) (06.09.22 @ 05:46) >  ACC: [id number] EXAM:  XR CHEST PORTABLE ROUTINE 1V                          PROCEDURE DATE:  06/09/2022          INTERPRETATION:  TIME OF EXAM: June 9, 2022 at 5:23 AM.    CLINICAL INFORMATION: Post thoracentesis.    COMPARISON:  June 8, 2022.    TECHNIQUE:   AP Portable chest x-ray.    INTERPRETATION:    The heart is not enlarged. A tracheostomy tube is in place.  Distended air-filled esophagus again seen in the upper chest.  Unchanged small right perihilar opacity.  No right pleural effusion. Small left pleural effusion with likely   associated passive atelectasis, not significantly changed.  No pneumothorax noted.  No acute bony abnormality.      IMPRESSION:  No right pleural effusion seen. Small left pleural effusion   with likely associated passive atelectasis, not significantly changed.    Unchanged small right perihilar opacity, of indeterminate nature.    Tracheostomy tube in place. After distended air-filled esophagus again   seen in the upper chest.    --- End of Report ---            ASHLEY PARSON MD; Attending Radiologist  This document has been electronically signed. Jun 9 2022 10:39AM    < end of copied text >      Consultant(s) Notes Reviewed:  [x] YES  [ ] NO    Care Discussed with [x] Consultants  [x] Patient  [ ] Family  [ ]      [ x] Other; RN  DVT ppx

## 2022-06-09 NOTE — DISCHARGE NOTE PROVIDER - NSDCFUADDINST_GEN_ALL_CORE_FT
- Please make an appointment for follow up with your primary doctor within 1-3 days of discharge  - It is important to see your primary physician as well as the physicians listed below to perform a comprehensive medical review.  Call their offices for an appointment as soon as you leave the hospital.  You will also need to see them for renewal of your medications.  If you do not have a primary physician, contact the [facility] Physician Referral Service at [phone].  To obtain your results, you can access the [facility] Patient Portal at [email or web address].   - Your medical issues appear to be stable at this time, but if your symptoms recur or worsen, contact your physicians and/or return to the hospital if necessary.    -If you encounter any issues or questions with your medication, call your physicians before stopping the medication.    - Limit your diet to 2 grams of sodium daily.  - Patient or caretaker is responsible for making all appointments

## 2022-06-09 NOTE — PROGRESS NOTE ADULT - ASSESSMENT
This is an 78 y/o F with PMH of HTN, DM type 2, Cardiac Arrest, CVA, COPD, Chronic Respiratory Failure Vent Dependant s/p trach & PEG, Multiple Hospital Admissions for Aspiration & vent associated PNA, COVID-19 Infection, Anemia with GI blood loss, GERD, and Advanced Dementia who was sent from [facility] facility for acute respiratory distress.     Sepsis 2/2 VAP/PNA c/b pleural effusions  Pleural Effusions: parapneumonic vs. empyema  - prior sputum CRE P. aeruginosa and MDRO S. marascens  - 5/23 sputum cx w/ moderate CRE Pseudomonas again and mixed GNR alejandro   - s/p avycaz  x7 day completed 5/28  Pt was discharged on 6/1 and then re-admitted again for recurrent pleural effusions. Restarted on zosyn.   - 6/2 sputum cx again w/ CRE Pseudomonas and MDRO S. marascens  Reviewed susceptibilities for above organisms w/ micro lab. Serratia not susceptible to avycaz/zerbaxa   - s/p thoracentesis on 6/8; pleural fluid cx NGTD  Plan:  C/w zosyn for CRE pseudomonas and anaerobic coverage in setting of pleural effusions, x7 day course w/ last day today 6/9  C/w ertapenem 1gm q24h for serratia coverage x5 day course w/ last day 6/11  C/w vent management per ICU    D/c planning per primary team--midline placement to complete ertapenem if pending d/c or if facility can give last dose via peripheral IV.     Infectious Diseases will continue to follow. Please call with any questions.   Andressa Brantley M.D.  [facility], Division of Infectious Diseases [phone]

## 2022-06-09 NOTE — PROGRESS NOTE ADULT - SUBJECTIVE AND OBJECTIVE BOX
BREA BECKHAM    [facility]    Allergies    codeine (Hives)    Intolerances        PAST MEDICAL & SURGICAL HISTORY:  Dementia of frontal lobe type      Aphasic stroke      Diabetes mellitus      Respiratory failure      Hypertension      GERD (gastroesophageal reflux disease)      Constipation      Respiratory failure      CVA (cerebral vascular accident)      HTN (hypertension)      DM (diabetes mellitus)      Advanced dementia      COVID-19 virus detected      Quadriplegia      Pneumonia      Type II diabetes mellitus      Hx of appendectomy      Gastrostomy in place      Tracheostomy in place      Tracheostomy tube present      Feeding by G-tube          FAMILY HISTORY:  No pertinent family history in first degree relatives        Home Medications:  albuterol 90 mcg/inh inhalation aerosol with adapter: 2  inhaled every 6 hours (21 May 2022 21:16)  Bacid (LAC) oral tablet: 2 tab(s) by gastrostomy tube once a day (21 May 2022 21:16)  Betadine 10% topical swab: cleanse right and left great toes (21 May 2022 21:16)  Carafate 1 g/10 mL oral suspension: 10 milliliter(s) by gastrostomy tube 4 times a day (before meals and at bedtime) for 14 days (Started 6/4/21) (21 May 2022 21:16)  chlorhexidine 0.12% mucous membrane liquid: 15 milliliter(s) mucous membrane 2 times a day (21 May 2022 21:16)  Eucerin topical cream: Apply topically to affected area once a day bilateral feet (21 May 2022 21:16)  folic acid 1 mg oral tablet: 1 tab(s) orally once a day (21 May 2022 21:16)  insulin glargine 100 units/mL subcutaneous solution: 8 unit(s) subcutaneous once a day (in the morning) (01 Jun 2022 08:24)  ipratropium-albuterol 0.5 mg-2.5 mg/3 mL inhalation solution: 3 milliliter(s) inhaled 4 times a day (21 May 2022 21:16)  LORazepam 1 mg oral tablet: 1 tab(s) by gastrostomy tube every 4 hours (21 May 2022 21:16)  methocarbamol 500 mg oral tablet: 1 tab(s) by gastrostomy tube 2 times a day (21 May 2022 21:16)  MiraLax oral powder for reconstitution:  (21 May 2022 23:14)  Multiple Vitamins oral tablet: 1 tab(s) orally once a day (21 May 2022 21:16)  nystatin 100,000 units/g topical powder: 1 application topically 3 times a day (29 May 2022 16:35)  omeprazole 20 mg oral delayed release capsule: orally 2 times a day (21 May 2022 23:14)  polyethylene glycol 3350 oral powder for reconstitution: 17 gram(s) by gastrostomy tube every 12 hours (21 May 2022 21:16)  senna 8.6 mg oral tablet: 3 tab(s) by gastrostomy tube once a day (at bedtime) (21 May 2022 21:16)  simethicone 80 mg oral tablet, chewable: 1 tab(s) by gastrostomy tube every 6 hours (21 May 2022 21:16)  Tylenol 325 mg oral tablet: 2 tab(s) by gastrostomy tube once a day; 60 minutes prior to dressing change  (21 May 2022 21:16)  Tylenol 325 mg oral tablet: 2 tab(s) by gastrostomy tube every 6 hours, As Needed (21 May 2022 21:16)      MEDICATIONS  (STANDING):  chlorhexidine 0.12% Liquid 15 milliLiter(s) Oral Mucosa every 12 hours  collagenase Ointment 1 Application(s) Topical daily  dextrose 5%. 1000 milliLiter(s) (50 mL/Hr) IV Continuous <Continuous>  dextrose 5%. 1000 milliLiter(s) (100 mL/Hr) IV Continuous <Continuous>  dextrose 50% Injectable 25 Gram(s) IV Push once  dextrose 50% Injectable 12.5 Gram(s) IV Push once  dextrose 50% Injectable 25 Gram(s) IV Push once  ertapenem  IVPB 1000 milliGRAM(s) IV Intermittent every 24 hours  ertapenem  IVPB      folic acid 1 milliGRAM(s) Oral daily  furosemide    Tablet 20 milliGRAM(s) Oral daily  glucagon  Injectable 1 milliGRAM(s) IntraMuscular once  heparin   Injectable 5000 Unit(s) SubCutaneous every 12 hours  insulin glargine Injectable (LANTUS) 8 Unit(s) SubCutaneous every morning  insulin lispro (ADMELOG) corrective regimen sliding scale   SubCutaneous every 6 hours  lactobacillus acidophilus 1 Tablet(s) Oral daily  LORazepam     Tablet 2 milliGRAM(s) Oral every 4 hours  methocarbamol 500 milliGRAM(s) Oral two times a day  multivitamin 1 Tablet(s) Oral daily  nystatin Powder 1 Application(s) Topical every 12 hours  pantoprazole  Injectable 40 milliGRAM(s) IV Push daily  piperacillin/tazobactam IVPB.. 3.375 Gram(s) IV Intermittent every 8 hours  povidone iodine 10% Solution 1 Application(s) Topical daily  senna 3 Tablet(s) Oral at bedtime  simethicone 80 milliGRAM(s) Chew every 6 hours    MEDICATIONS  (PRN):  acetaminophen    Suspension .. 650 milliGRAM(s) Oral every 6 hours PRN Temp greater or equal to 38C (100.4F), Mild Pain (1 - 3)  albuterol/ipratropium for Nebulization 3 milliLiter(s) Nebulizer every 6 hours PRN Shortness of Breath and/or Wheezing  dextrose Oral Gel 15 Gram(s) Oral once PRN Blood Glucose LESS THAN 70 milliGRAM(s)/deciliter  LORazepam   Injectable 2 milliGRAM(s) IV Push every 4 hours PRN Agitation      Diet, NPO with Tube Feed:   Tube Feeding Modality: Gastrostomy  Glucerna 1.5 Horacio  Total Volume for 24 Hours (mL): 1200  Continuous  Until Goal Tube Feed Rate (mL per Hour): 60  Tube Feed Duration (in Hours): 20  Tube Feed Start Time: 00:00  Free Water Flush  Pump   Rate (mL per Hour): 25   Frequency: Every Hour    Duration (Hours): 24 (06-02-22 @ 11:46) [Active]          Vital Signs Last 24 Hrs  T(C): 36.2 (09 Jun 2022 08:43), Max: 37.6 (09 Jun 2022 04:43)  T(F): 97.1 (09 Jun 2022 08:43), Max: 99.6 (09 Jun 2022 04:43)  HR: 84 (09 Jun 2022 10:00) (64 - 111)  BP: 117/62 (09 Jun 2022 10:00) (89/59 - 129/70)  BP(mean): 78 (09 Jun 2022 10:00) (67 - 88)  RR: 25 (09 Jun 2022 10:00) (21 - 29)  SpO2: 100% (09 Jun 2022 10:00) (99% - 100%)      06-08-22 @ 07:01  -  06-09-22 @ 07:00  --------------------------------------------------------  IN: 1120 mL / OUT: 500 mL / NET: 620 mL        Mode: AC/ CMV (Assist Control/ Continuous Mandatory Ventilation),PRVC, RR (machine): 24, TV (machine): 400, FiO2: 30, PEEP: 5, ITime: 0.9, MAP: 12, PIP: 23      LABS:                        11.7   11.65 )-----------( 318      ( 09 Jun 2022 06:00 )             38.4     06-09    136  |  97  |  51<H>  ----------------------------<  244<H>  4.7   |  28  |  1.46<H>    Ca    9.4      09 Jun 2022 06:00  Phos  4.5     06-09  Mg     2.4     06-09    TPro  7.8  /  Alb  2.5<L>  /  TBili  0.5  /  DBili  x   /  AST  13  /  ALT  16  /  AlkPhos  144<H>  06-09    PT/INR - ( 08 Jun 2022 06:00 )   PT: 13.4 sec;   INR: 1.13 ratio                   WBC:  WBC Count: 11.65 K/uL (06-09 @ 06:00)  WBC Count: 7.90 K/uL (06-08 @ 06:00)  WBC Count: 5.74 K/uL (06-07 @ 06:00)  WBC Count: 4.98 K/uL (06-06 @ 06:40)      MICROBIOLOGY:  RECENT CULTURES:  06-08 .Body Fluid Pleural Fluid XXXX   polymorphonuclear leukocytes seen  No organisms seen  by cytocentrifuge XXXX    06-03 Catheterized Catheterized XXXX XXXX   <10,000 CFU/mL Normal Urogenital Elvira    06-03 .Blood Blood-Peripheral XXXX XXXX   No Growth Final    06-02 Trach Asp Tracheal Aspirate Serratia marcescens  Pseudomonas aeruginosa (Carbapenem Resistant)   Rare polymorphonuclear leukocytes per low power field  Rare Squamous epithelial cells per low power field  Moderate Gram Negative Rods per oil power field  Moderate Gram Negative Coccobacilli per oil power field   Numerous Serratia marcescens  Moderate Pseudomonas aeruginosa (Carbapenem Resistant)  Normal Respiratory Elvira present                PT/INR - ( 08 Jun 2022 06:00 )   PT: 13.4 sec;   INR: 1.13 ratio             Sodium:  Sodium, Serum: 136 mmol/L (06-09 @ 06:00)  Sodium, Serum: 136 mmol/L (06-08 @ 06:00)  Sodium, Serum: 137 mmol/L (06-07 @ 06:00)  Sodium, Serum: 142 mmol/L (06-06 @ 06:40)      1.46 mg/dL 06-09 @ 06:00  1.25 mg/dL 06-08 @ 06:00  1.30 mg/dL 06-07 @ 06:00  1.14 mg/dL 06-06 @ 06:40      Hemoglobin:  Hemoglobin: 11.7 g/dL (06-09 @ 06:00)  Hemoglobin: 12.4 g/dL (06-08 @ 06:00)  Hemoglobin: 12.2 g/dL (06-07 @ 06:00)  Hemoglobin: 10.4 g/dL (06-06 @ 06:40)      Platelets: Platelet Count - Automated: 318 K/uL (06-09 @ 06:00)  Platelet Count - Automated: 265 K/uL (06-08 @ 06:00)  Platelet Count - Automated: 284 K/uL (06-07 @ 06:00)  Platelet Count - Automated: 252 K/uL (06-06 @ 06:40)      LIVER FUNCTIONS - ( 09 Jun 2022 06:00 )  Alb: 2.5 g/dL / Pro: 7.8 g/dL / ALK PHOS: 144 U/L / ALT: 16 U/L DA / AST: 13 U/L / GGT: x                 RADIOLOGY & ADDITIONAL STUDIES:      MICROBIOLOGY:  RECENT CULTURES:  06-08 .Body Fluid Pleural Fluid XXXX   polymorphonuclear leukocytes seen  No organisms seen  by cytocentrifuge XXXX    06-03 Catheterized Catheterized XXXX XXXX   <10,000 CFU/mL Normal Urogenital Elvira    06-03 .Blood Blood-Peripheral XXXX XXXX   No Growth Final    06-02 Trach Asp Tracheal Aspirate Serratia marcescens  Pseudomonas aeruginosa (Carbapenem Resistant)   Rare polymorphonuclear leukocytes per low power field  Rare Squamous epithelial cells per low power field  Moderate Gram Negative Rods per oil power field  Moderate Gram Negative Coccobacilli per oil power field   Numerous Serratia marcescens  Moderate Pseudomonas aeruginosa (Carbapenem Resistant)  Normal Respiratory Elvira present

## 2022-06-09 NOTE — PROGRESS NOTE ADULT - ASSESSMENT
78 y/o F with PMH of HTN, DM type 2, Cardiac Arrest, CVA, COPD, Chronic Respiratory Failure Vent Dependant s/p trach & PEG, Multiple Hospital Admissions for Aspiration & vent associated PNA, COVID-19 Infection, Anemia with GI blood loss, GERD, and Advanced Dementia presented with acute respiratory distress.      on ABX  on LASIX  GI eval noted  Pulm follow up  I and O  pleurocentesis - pleurx discussion - s/p thoracentesis    HCP Marciano -  - pt is full code  s/p emp ABX - broad spectrum for poss PNA  [facility] rx regimen  bronchodilators  oral and skin care  assist with needs - ADL  monitor VS and HD  CT imaging reviewed  Old records reviewed  suction PRN  trach care  peg care  nutritional optimization  decubitus prevention

## 2022-06-09 NOTE — DISCHARGE NOTE PROVIDER - NSDCCPCAREPLAN_GEN_ALL_CORE_FT
PRINCIPAL DISCHARGE DIAGNOSIS  Diagnosis: Acute respiratory failure with hypoxia  Assessment and Plan of Treatment: Recommend to continue Lasix daily and monitor renal function closely. Repeat BMP within 1-3 days of discharge       PRINCIPAL DISCHARGE DIAGNOSIS  Diagnosis: Acute respiratory failure with hypoxia  Assessment and Plan of Treatment: Recommend to continue Lasix daily and monitor renal function closely. Repeat BMP within 1-3 days of discharge. Continue Ertapenem for 1 more day until tomorrow 6/11      SECONDARY DISCHARGE DIAGNOSES  Diagnosis: Acute CHF  Assessment and Plan of Treatment: Continue low dose lasix daily

## 2022-06-09 NOTE — DISCHARGE NOTE PROVIDER - NSDCMRMEDTOKEN_GEN_ALL_CORE_FT
albuterol 90 mcg/inh inhalation aerosol with adapter: 2  inhaled every 6 hours  Bacid (LAC) oral tablet: 2 tab(s) by gastrostomy tube once a day  Betadine 10% topical swab: cleanse right and left great toes  Carafate 1 g/10 mL oral suspension: 10 milliliter(s) by gastrostomy tube 4 times a day (before meals and at bedtime) for 14 days (Started 6/4/21)  chlorhexidine 0.12% mucous membrane liquid: 15 milliliter(s) mucous membrane 2 times a day  Eucerin topical cream: Apply topically to affected area once a day bilateral feet  folic acid 1 mg oral tablet: 1 tab(s) orally once a day  insulin glargine 100 units/mL subcutaneous solution: 8 unit(s) subcutaneous once a day (in the morning)  ipratropium-albuterol 0.5 mg-2.5 mg/3 mL inhalation solution: 3 milliliter(s) inhaled 4 times a day  LORazepam 1 mg oral tablet: 1 tab(s) by gastrostomy tube every 4 hours  methocarbamol 500 mg oral tablet: 1 tab(s) by gastrostomy tube 2 times a day  MiraLax oral powder for reconstitution:   Multiple Vitamins oral tablet: 1 tab(s) orally once a day  nystatin 100,000 units/g topical powder: 1 application topically 3 times a day  omeprazole 20 mg oral delayed release capsule: orally 2 times a day  polyethylene glycol 3350 oral powder for reconstitution: 17 gram(s) by gastrostomy tube every 12 hours  senna 8.6 mg oral tablet: 3 tab(s) by gastrostomy tube once a day (at bedtime)  simethicone 80 mg oral tablet, chewable: 1 tab(s) by gastrostomy tube every 6 hours  Tylenol 325 mg oral tablet: 2 tab(s) by gastrostomy tube once a day; 60 minutes prior to dressing change   Tylenol 325 mg oral tablet: 2 tab(s) by gastrostomy tube every 6 hours, As Needed   albuterol 90 mcg/inh inhalation aerosol with adapter: 2  inhaled every 6 hours  Bacid (LAC) oral tablet: 2 tab(s) by gastrostomy tube once a day  Betadine 10% topical swab: cleanse right and left great toes  Carafate 1 g/10 mL oral suspension: 10 milliliter(s) by gastrostomy tube 4 times a day (before meals and at bedtime) for 14 days (Started 6/4/21)  chlorhexidine 0.12% mucous membrane liquid: 15 milliliter(s) mucous membrane 2 times a day  ertapenem 1 g injection: 1 gram(s) injectable once a day until 6/11  Eucerin topical cream: Apply topically to affected area once a day bilateral feet  folic acid 1 mg oral tablet: 1 tab(s) orally once a day  furosemide 20 mg oral tablet: 1 tab(s) orally once a day  insulin glargine 100 units/mL subcutaneous solution: 8 unit(s) subcutaneous once a day (in the morning)  ipratropium-albuterol 0.5 mg-2.5 mg/3 mL inhalation solution: 3 milliliter(s) inhaled 4 times a day  LORazepam 1 mg oral tablet: 1 tab(s) by gastrostomy tube every 4 hours  methocarbamol 500 mg oral tablet: 1 tab(s) by gastrostomy tube 2 times a day  MiraLax oral powder for reconstitution:   Multiple Vitamins oral tablet: 1 tab(s) orally once a day  nystatin 100,000 units/g topical powder: 1 application topically 3 times a day  omeprazole 20 mg oral delayed release capsule: orally 2 times a day  polyethylene glycol 3350 oral powder for reconstitution: 17 gram(s) by gastrostomy tube every 12 hours  senna 8.6 mg oral tablet: 3 tab(s) by gastrostomy tube once a day (at bedtime)  simethicone 80 mg oral tablet, chewable: 1 tab(s) by gastrostomy tube every 6 hours  Tylenol 325 mg oral tablet: 2 tab(s) by gastrostomy tube once a day; 60 minutes prior to dressing change   Tylenol 325 mg oral tablet: 2 tab(s) by gastrostomy tube every 6 hours, As Needed

## 2022-06-09 NOTE — PROGRESS NOTE ADULT - ASSESSMENT
AGE/SEX.   76 f  DOA.  6/2/2022  CC .  6/2/2022 Resp distress at [facility]  PMH .  [facility] Hosp stay 5/21-6/1/2022 ceftazidime avibactam 1.25x2 5/22- 5/30/2022    [facility] Hosp stay given zosyn 4/21  pmh Pleural effsn   5/25/2022 1.5 l clear pl effsn removed left side IR  5/25/2022 g 183 l 144/381 .37 p 3.4/5.3 .64 p 23 l 36   5/25/2022l pl fluid  lymph pred exudate   cyto (-)     pmh TRANSFUSION.  5/23/2022 1 u prbc  pmh Pneumonia    pmh HTN,   pmh DM,   pmh CVA,   pmh  chronic respiratory failure   pmh s/p trach, PEG,   pmh cardiac arrest  REVIEW OF SYMPTOMS      Able to give (reliable) ROS  NO    PHYSICAL EXAM    Has trach  peg    HEENT Unremarkable  atraumatic   RESP Fair air entry EXP prolonged    Harsh breath sound Resp distres mild   CARDIAC S1 S2 No S3     NO JVD    ABDOMEN SOFT BS PRESENT NOT DISTENDED No hepatosplenomegaly   PEDAL EDEMA present No calf tenderness  NO rash     MAIN ISSUES .  CHF HFPEF AOC poa 6/1   VDRF pmh   FEVER 6/3/2022  r PL EFFSN Thorac requested 6/7/2022 6/8/2022 r thorac g 161 l 222 p 4.9 p 10 l 16 .38      COVID/ICU/CODE STATUS.                       COVID  STATUS.    6/2/2022 scv2 (-)        ICU STAY. 6/2/2022  GOC.  6/2/2022 full code     BEST PRACTICE ISSUES.                                                  HEAD OF BED ELEVATION. Yes  DVT PROPHYLAXIS.   6/1 hpsc    SQUIRES PROPHYLAXIS. 6/1 protonix 40                                                                                        DIET.  6/2/2022 glucerna 1.5 1440     VITALS/PO/IO/VENT/DRIPS.   6/9/2022 76 110/70   6/9/2022 ac 24/400/5/.3      PROBLEM DATA/ASSESSMENT RECOMMENDATIONS (A/R).    HEMODYNAMICS.   Monitor and optimize bp   Target MAP to miguel  65 (+)    RESP.   Monitor and optimize  po   Target po to remain 90-95%    ABG.   6/3/2022  6a 24/400/5/.4 750/41/62     PMH/PSH PROBLEMS.  Management continued/modified as indicated    VENT MANAGEMENT.   HOB elevation  Target Pplat 30 (-)  Target PO 90-95%  Target pH 730 (+)  Daily spontaneous breathing trials   Daily sedation vacation         INFECTION SOURCE DD.   INFECTION A/R.   Genna is status post recent course of abio ceftazidime avibactam 1.25x2 5/22- 5/30/2022    Trach 6/2 1) Mod gnr 2) Mod gn coccobacilli  SERRATIA CARBAPENEM RESISTANT PSEUDOMONAS   Pt has crp in sputum   will follow with id ama  6/7/2022 ertapenem 1x 7d SERRATIA   6/3 zosyn CARBAPENEM RESISTANT  pseudomonas     COPD.  6/1/2022 duoneb.4  PLEURAL EFFUSIONS  ct chest 6/1/2022 Pulm edema Worsening bl effsns likely reflecting progressive chf   Case dw cts Dr JOSE Kellogg and he feels that at this point pleurex is NOT indicated   will dw ir re thoracentesis   6/8/2022 r thorac g 161 l 222 p 4.9 p 10 l 16 .38    await cyto    CHF.  echo 5/30/2022 ef 65% pasp 60 dialted rv   6/2/2022 lasix 40 -> 6/8 lasix 20   ANEMIA.  Hb 6/2-6/3/2022 Hb 10.6 - 11.3   RENAL.  Na 6/2-6/3/2022 Na 145 - 144  Cr 6/2-6/3-6/4-6/5-6/7-6/9/2022   Cr 1.1- 1.2 - 1.4 - 1.2 -1.3- 1.4   DM.   6/1 riss   6/1 lantus 8  ANXIETY.  6/2/2022 lorazepam 2.6   DECUB.   6/1 collagenase r gluteal wound       TIME SPENT   Over 39 minutes aggregate critical care time spent on encounter; activities included   direct patient care, counseling and/or coordinating care reviewing notes, lab data/ imaging , discussion with multidisciplinary team/ patient  /family and explaining in detail risks, benefits, alternatives  of the recommendations     CHAPINCITO GUIDRY 76 f

## 2022-06-09 NOTE — PROGRESS NOTE ADULT - ASSESSMENT
The patient is a 76 year old female with a history of HTN, DM, CVA, dementia, chronic respiratory failure s/p trach, PEG, cardiac arrest, anemia who presents with respiratory distress.    Plan:  - Echo 5/30/22 with normal LV systolic function, mod pulm HTN  - Cardiac enzymes negative  - CT chest with moderate bilateral pleural effusions  - Pleural effusions last admission consistent with exudate  - Furosemide lowered to 20 mg PEG daily  - On ertapenem  - Pulm and ID follow-up

## 2022-06-09 NOTE — PROGRESS NOTE ADULT - SUBJECTIVE AND OBJECTIVE BOX
[facility], Division of Infectious Diseases  MOIZ Lora Y. Patel, S. Shah, G. [facility]  [phone]    Name: BREA BECKHAM  Age: 76y  Gender: Female  MRN: [id number]    Interval History:  Patient seen and examined at bedside this morning in SPCU  No acute overnight events. Afebrile  Notes reviewed. S/p thoracentesis yesterday AM w/ removal of 385 cc of fluid    Antibiotics:  ertapenem  IVPB 1000 milliGRAM(s) IV Intermittent every 24 hours  ertapenem  IVPB      piperacillin/tazobactam IVPB.. 3.375 Gram(s) IV Intermittent every 8 hours      Medications:  acetaminophen    Suspension .. 650 milliGRAM(s) Oral every 6 hours PRN  albuterol/ipratropium for Nebulization 3 milliLiter(s) Nebulizer every 6 hours PRN  chlorhexidine 0.12% Liquid 15 milliLiter(s) Oral Mucosa every 12 hours  collagenase Ointment 1 Application(s) Topical daily  dextrose 5%. 1000 milliLiter(s) IV Continuous <Continuous>  dextrose 5%. 1000 milliLiter(s) IV Continuous <Continuous>  dextrose 50% Injectable 25 Gram(s) IV Push once  dextrose 50% Injectable 12.5 Gram(s) IV Push once  dextrose 50% Injectable 25 Gram(s) IV Push once  dextrose Oral Gel 15 Gram(s) Oral once PRN  ertapenem  IVPB 1000 milliGRAM(s) IV Intermittent every 24 hours  ertapenem  IVPB      folic acid 1 milliGRAM(s) Oral daily  furosemide    Tablet 20 milliGRAM(s) Oral daily  glucagon  Injectable 1 milliGRAM(s) IntraMuscular once  heparin   Injectable 5000 Unit(s) SubCutaneous every 12 hours  insulin glargine Injectable (LANTUS) 8 Unit(s) SubCutaneous every morning  insulin lispro (ADMELOG) corrective regimen sliding scale   SubCutaneous every 6 hours  lactobacillus acidophilus 1 Tablet(s) Oral daily  LORazepam     Tablet 2 milliGRAM(s) Oral every 4 hours  LORazepam   Injectable 2 milliGRAM(s) IV Push every 4 hours PRN  methocarbamol 500 milliGRAM(s) Oral two times a day  multivitamin 1 Tablet(s) Oral daily  nystatin Powder 1 Application(s) Topical every 12 hours  pantoprazole  Injectable 40 milliGRAM(s) IV Push daily  piperacillin/tazobactam IVPB.. 3.375 Gram(s) IV Intermittent every 8 hours  povidone iodine 10% Solution 1 Application(s) Topical daily  senna 3 Tablet(s) Oral at bedtime  simethicone 80 milliGRAM(s) Chew every 6 hours      Review of Systems:  unable to obtain    Allergies: codeine (Hives)    For details regarding the patient's past medical history, social history, family history, and other miscellaneous elements, please refer the initial infectious diseases consultation and/or the admitting history and physical examination for this admission.    Objective:  Vitals:   T(C): 36.2 (06-09-22 @ 08:43), Max: 37.6 (06-09-22 @ 04:43)  HR: 79 (06-09-22 @ 08:00) (64 - 111)  BP: 104/61 (06-09-22 @ 08:00) (88/53 - 139/78)  RR: 24 (06-09-22 @ 08:00) (21 - 29)  SpO2: 100% (06-09-22 @ 08:00) (97% - 100%)    Mode: AC/ CMV (Assist Control/ Continuous Mandatory Ventilation),PRVC  RR (machine): 24  TV (machine): 400  FiO2: 30  PEEP: 5  ITime: 0.9  MAP: 12  PIP: 23      Physical Examination:  General: no acute distress  HEENT: NC/AT, EOMI  Neck: trach to vent  Cardio: S1, S2 heard, RRR, no murmurs  Resp: MV breath sounds  Abd: soft, NT, ND, PGT in place  Ext: no edema or cyanosis  Skin: warm, dry, no visible rash    Laboratory Studies:  CBC:                       11.7   11.65 )-----------( 318      ( 09 Jun 2022 06:00 )             38.4     CMP: 06-09    136  |  97  |  51<H>  ----------------------------<  244<H>  4.7   |  28  |  1.46<H>    Ca    9.4      09 Jun 2022 06:00  Phos  4.5     06-09  Mg     2.4     06-09    TPro  7.8  /  Alb  2.5<L>  /  TBili  0.5  /  DBili  x   /  AST  13  /  ALT  16  /  AlkPhos  144<H>  06-09    LIVER FUNCTIONS - ( 09 Jun 2022 06:00 )  Alb: 2.5 g/dL / Pro: 7.8 g/dL / ALK PHOS: 144 U/L / ALT: 16 U/L DA / AST: 13 U/L / GGT: x               Microbiology: reviewed    Culture - Body Fluid with Gram Stain (collected 06-08-22 @ 11:41)  Source: .Body Fluid Pleural Fluid  Gram Stain (06-08-22 @ 22:22):    polymorphonuclear leukocytes seen    No organisms seen    by cytocentrifuge    Culture - Acid Fast - Body Fluid w/Smear (collected 06-08-22 @ 11:41)  Source: .Body Fluid Pleural Fluid    Culture - Urine (collected 06-03-22 @ 21:53)  Source: Catheterized Catheterized  Final Report (06-05-22 @ 17:10):    <10,000 CFU/mL Normal Urogenital Elvira    Culture - Blood (collected 06-03-22 @ 18:12)  Source: .Blood Blood-Peripheral  Final Report (06-09-22 @ 01:01):    No Growth Final    Culture - Blood (collected 06-03-22 @ 18:12)  Source: .Blood Blood-Peripheral  Final Report (06-09-22 @ 01:01):    No Growth Final    Culture - Sputum (collected 06-02-22 @ 14:57)  Source: Trach Asp Tracheal Aspirate  Gram Stain (06-02-22 @ 23:35):    Rare polymorphonuclear leukocytes per low power field    Rare Squamous epithelial cells per low power field    Moderate Gram Negative Rods per oil power field    Moderate Gram Negative Coccobacilli per oil power field  Final Report (06-05-22 @ 17:27):    Numerous Serratia marcescens    Moderate Pseudomonas aeruginosa (Carbapenem Resistant)    Normal Respiratory Elvira present  Organism: Pseudomonas aeruginosa (Carbapenem Resistant) (06-07-22 @ 09:35)      -  Amikacin: S <=16      -  Aztreonam: S <=4      -  Cefepime: S 4      -  Ceftazidime: S 4      -  Ceftazidime/Avibactam: S <=4      -  Ceftolozane/tazobactam: S <=2      -  Ciprofloxacin: S <=0.25      -  Gentamicin: S <=2      -  Imipenem: R >8      -  Levofloxacin: S <=0.5      -  Meropenem: R 8      -  Piperacillin/Tazobactam: S <=8      -  Tobramycin: S <=2      Method Type: PRATIK  Organism: Serratia marcescens (06-07-22 @ 09:35)      -  Amikacin: S <=16      -  Amoxicillin/Clavulanic Acid: R >16/8      -  Ampicillin: R >16 These ampicillin results predict results for amoxicillin      -  Ampicillin/Sulbactam: R >16/8 Enterobacter, Klebsiella aerogenes, Citrobacter, and Serratia may develop resistance during prolonged therapy (3-4 days)      -  Aztreonam: R >16      -  Cefazolin: R >16 Enterobacter, Klebsiella aerogenes, Citrobacter, and Serratia may develop resistance during prolonged therapy (3-4 days)      -  Cefepime: R >16      -  Cefoxitin: R >16      -  Ceftazidime/Avibactam: R >16      -  Ceftolozane/tazobactam: R >8      -  Ceftriaxone: R >32 Enterobacter, Klebsiella aerogenes, Citrobacter, and Serratia may develop resistance during prolonged therapy      -  Ciprofloxacin: R >2      -  Ertapenem: S <=0.5      -  Gentamicin: S <=2      -  Levofloxacin: R 2      -  Meropenem: S <=1      -  Piperacillin/Tazobactam: I 32      -  Tobramycin: S 4      -  Trimethoprim/Sulfamethoxazole: S <=0.5/9.5      Method Type: PRATIK  Organism: Serratia marcescens  Pseudomonas aeruginosa (Carbapenem Resistant) (06-05-22 @ 17:27)        Radiology: reviewed

## 2022-06-09 NOTE — PROGRESS NOTE ADULT - ASSESSMENT
79F HTN, DM2, COPD, Chronic Respiratory Failure on Trach to Vent re-admitted for Acute Respiratory Distress    Acute Respiratory Distress  Patient discharged 6/1/22 and repeat CT showing increase pleural effusions; Continue IV Zosyn, Ertapenem added  Disucssed with ID, plan for 5-7 days of Ertapenem  CS able to administer abx to complete the course   Treated with Abx for PNA on prior admission and had Thoracentesis done 1500cc on Left Side   Had thoracentesis with 350cc  TTE- Normal LV, dilated RV, mod pulm htn, small pericardial effusion  Continue Lasix Daily, changed to PO  Pulmonary and Cardiology Input appreciated    Anemia of Chronic Disease with Iron Deficiency  Has been evaluated by GI and Hematology on prior admissions  She has Anemia of Chronic Disease but could also have intermittent GI Bleeding; Occult Blood Negative  S/P 2U PRBC Transfusion and IV Venofer on prior admission  Multivitamin daily    Acute Renal Failure on CKD 3  Baseline Cr around 1.2  Renally dose and monitor BMP and electrolytes     HTN  Hold all BP lower agents    DM2  FS and on ISS to maintain BS <180    Diet  Tube Feeds via PEG  PEG was leaking and replaced on 6/3/22    DVT Prophylaxis  Heparin restarted    Disposition  Full Code     Patient medically stable for discharge to vent facility but at high readmission risk. She remains at high risk for abrupt decompensation and death. Comfort care would be appropriate but  would like all aggressive measures.

## 2022-06-09 NOTE — PROGRESS NOTE ADULT - SUBJECTIVE AND OBJECTIVE BOX
Subjective:  No change in status. Vented via trach.     MEDICATIONS  (STANDING):  chlorhexidine 0.12% Liquid 15 milliLiter(s) Oral Mucosa every 12 hours  collagenase Ointment 1 Application(s) Topical daily  dextrose 5%. 1000 milliLiter(s) (50 mL/Hr) IV Continuous <Continuous>  dextrose 5%. 1000 milliLiter(s) (100 mL/Hr) IV Continuous <Continuous>  dextrose 50% Injectable 25 Gram(s) IV Push once  dextrose 50% Injectable 12.5 Gram(s) IV Push once  dextrose 50% Injectable 25 Gram(s) IV Push once  ertapenem  IVPB 1000 milliGRAM(s) IV Intermittent every 24 hours  ertapenem  IVPB      folic acid 1 milliGRAM(s) Oral daily  furosemide    Tablet 20 milliGRAM(s) Oral daily  glucagon  Injectable 1 milliGRAM(s) IntraMuscular once  heparin   Injectable 5000 Unit(s) SubCutaneous every 12 hours  insulin glargine Injectable (LANTUS) 8 Unit(s) SubCutaneous every morning  insulin lispro (ADMELOG) corrective regimen sliding scale   SubCutaneous every 6 hours  lactobacillus acidophilus 1 Tablet(s) Oral daily  LORazepam     Tablet 2 milliGRAM(s) Oral every 4 hours  methocarbamol 500 milliGRAM(s) Oral two times a day  multivitamin 1 Tablet(s) Oral daily  nystatin Powder 1 Application(s) Topical every 12 hours  pantoprazole  Injectable 40 milliGRAM(s) IV Push daily  piperacillin/tazobactam IVPB.. 3.375 Gram(s) IV Intermittent every 8 hours  povidone iodine 10% Solution 1 Application(s) Topical daily  senna 3 Tablet(s) Oral at bedtime  simethicone 80 milliGRAM(s) Chew every 6 hours    MEDICATIONS  (PRN):  acetaminophen    Suspension .. 650 milliGRAM(s) Oral every 6 hours PRN Temp greater or equal to 38C (100.4F), Mild Pain (1 - 3)  albuterol/ipratropium for Nebulization 3 milliLiter(s) Nebulizer every 6 hours PRN Shortness of Breath and/or Wheezing  dextrose Oral Gel 15 Gram(s) Oral once PRN Blood Glucose LESS THAN 70 milliGRAM(s)/deciliter  LORazepam   Injectable 2 milliGRAM(s) IV Push every 4 hours PRN Agitation          T(C): 36.2 (06-09-22 @ 08:43), Max: 37.6 (06-09-22 @ 04:43)  HR: 79 (06-09-22 @ 08:00) (64 - 111)  BP: 104/61 (06-09-22 @ 08:00) (88/53 - 129/70)  RR: 24 (06-09-22 @ 08:00) (21 - 29)  SpO2: 100% (06-09-22 @ 08:00) (99% - 100%)  Wt(kg): --        I&O's Detail    08 Jun 2022 07:01  -  09 Jun 2022 07:00  --------------------------------------------------------  IN:    Free Water: 300 mL    Glucerna 1.5: 720 mL    IV PiggyBack: 100 mL  Total IN: 1120 mL    OUT:    Incontinent per Collection Bag (mL): 500 mL  Total OUT: 500 mL    Total NET: 620 mL          Mode: AC/ CMV (Assist Control/ Continuous Mandatory Ventilation),PRVC  RR (machine): 24  TV (machine): 400  FiO2: 30  PEEP: 5  ITime: 0.9  MAP: 12  PIP: 23       PHYSICAL EXAM:    NECK: trach  CHEST/LUNG: Clear  HEART: S1S2  ABDOMEN: Soft, G-tube  EXTREMITIES:  no edema        LABS:  CBC Full  -  ( 09 Jun 2022 06:00 )  WBC Count : 11.65 K/uL  RBC Count : 4.49 M/uL  Hemoglobin : 11.7 g/dL  Hematocrit : 38.4 %  Platelet Count - Automated : 318 K/uL  Mean Cell Volume : 85.5 fl  Mean Cell Hemoglobin : 26.1 pg  Mean Cell Hemoglobin Concentration : 30.5 gm/dL  Auto Neutrophil # : 9.98 K/uL  Auto Lymphocyte # : 0.81 K/uL  Auto Monocyte # : 0.72 K/uL  Auto Eosinophil # : 0.06 K/uL  Auto Basophil # : 0.03 K/uL  Auto Neutrophil % : 85.6 %  Auto Lymphocyte % : 7.0 %  Auto Monocyte % : 6.2 %  Auto Eosinophil % : 0.5 %  Auto Basophil % : 0.3 %    06-09    136  |  97  |  51<H>  ----------------------------<  244<H>  4.7   |  28  |  1.46<H>    Ca    9.4      09 Jun 2022 06:00  Phos  4.5     06-09  Mg     2.4     06-09    TPro  7.8  /  Alb  2.5<L>  /  TBili  0.5  /  DBili  x   /  AST  13  /  ALT  16  /  AlkPhos  144<H>  06-09    PT/INR - ( 08 Jun 2022 06:00 )   PT: 13.4 sec;   INR: 1.13 ratio             Impression:  * CKD3. Mild pre-renal flux.   * HyperK -- mild. Improved.   * PNA, pl eff  * Chronic vent dependent resp failure    Recommendations:  * Stable from renal POV  * Monitor Cr on low dose loop diuretic.   * Low K TFs

## 2022-06-09 NOTE — PROGRESS NOTE ADULT - SUBJECTIVE AND OBJECTIVE BOX
Chief Complaint: Respiratory distress    Interval Events: No events overnight.    Review of Systems:  Unable to obtain    Physical Exam:  Vital Signs Last 24 Hrs  T(C): 36.2 (09 Jun 2022 08:43), Max: 37.6 (09 Jun 2022 04:43)  T(F): 97.1 (09 Jun 2022 08:43), Max: 99.6 (09 Jun 2022 04:43)  HR: 83 (09 Jun 2022 10:44) (64 - 111)  BP: 117/62 (09 Jun 2022 10:00) (89/59 - 129/70)  BP(mean): 78 (09 Jun 2022 10:00) (67 - 88)  RR: 25 (09 Jun 2022 10:00) (21 - 29)  SpO2: 100% (09 Jun 2022 10:44) (99% - 100%)  General: NAD  HEENT: Trach  Neck: No JVD, no carotid bruit  Lungs: Coarse bilaterally  CV: RRR, nl S1/S2, no M/R/G  Abdomen: S/NT/ND, +BS  Extremities: No LE edema, no cyanosis  Neuro: AAOx0  Skin: No rash    Labs:  06-09    136  |  97  |  51<H>  ----------------------------<  244<H>  4.7   |  28  |  1.46<H>    Ca    9.4      09 Jun 2022 06:00  Phos  4.5     06-09  Mg     2.4     06-09    TPro  7.8  /  Alb  2.5<L>  /  TBili  0.5  /  DBili  x   /  AST  13  /  ALT  16  /  AlkPhos  144<H>  06-09                          11.7   11.65 )-----------( 318      ( 09 Jun 2022 06:00 )             38.4     Telemetry: Sinus rhythm

## 2022-06-09 NOTE — PROGRESS NOTE ADULT - SUBJECTIVE AND OBJECTIVE BOX
Date/Time Patient Seen:  		  Referring MD:   Data Reviewed	       Patient is a 76y old  Female who presents with a chief complaint of Respiratory distress. (08 Jun 2022 13:43)      Subjective/HPI     PAST MEDICAL & SURGICAL HISTORY:  Dementia of frontal lobe type    Aphasic stroke    Diabetes mellitus    Respiratory failure    Hypertension    GERD (gastroesophageal reflux disease)    Constipation    Respiratory failure    CVA (cerebral vascular accident)    HTN (hypertension)    DM (diabetes mellitus)    Advanced dementia    COVID-19 virus detected    Quadriplegia    Pneumonia    Type II diabetes mellitus    Hx of appendectomy    Gastrostomy in place    Tracheostomy in place    Tracheostomy tube present    Feeding by G-tube          Medication list         MEDICATIONS  (STANDING):  chlorhexidine 0.12% Liquid 15 milliLiter(s) Oral Mucosa every 12 hours  collagenase Ointment 1 Application(s) Topical daily  dextrose 5%. 1000 milliLiter(s) (100 mL/Hr) IV Continuous <Continuous>  dextrose 5%. 1000 milliLiter(s) (50 mL/Hr) IV Continuous <Continuous>  dextrose 50% Injectable 25 Gram(s) IV Push once  dextrose 50% Injectable 12.5 Gram(s) IV Push once  dextrose 50% Injectable 25 Gram(s) IV Push once  ertapenem  IVPB 1000 milliGRAM(s) IV Intermittent every 24 hours  ertapenem  IVPB      folic acid 1 milliGRAM(s) Oral daily  furosemide    Tablet 20 milliGRAM(s) Oral daily  glucagon  Injectable 1 milliGRAM(s) IntraMuscular once  heparin   Injectable 5000 Unit(s) SubCutaneous every 12 hours  insulin glargine Injectable (LANTUS) 8 Unit(s) SubCutaneous every morning  insulin lispro (ADMELOG) corrective regimen sliding scale   SubCutaneous every 6 hours  lactobacillus acidophilus 1 Tablet(s) Oral daily  LORazepam     Tablet 2 milliGRAM(s) Oral every 4 hours  methocarbamol 500 milliGRAM(s) Oral two times a day  multivitamin 1 Tablet(s) Oral daily  nystatin Powder 1 Application(s) Topical every 12 hours  pantoprazole  Injectable 40 milliGRAM(s) IV Push daily  piperacillin/tazobactam IVPB.. 3.375 Gram(s) IV Intermittent every 8 hours  povidone iodine 10% Solution 1 Application(s) Topical daily  senna 3 Tablet(s) Oral at bedtime  simethicone 80 milliGRAM(s) Chew every 6 hours    MEDICATIONS  (PRN):  acetaminophen    Suspension .. 650 milliGRAM(s) Oral every 6 hours PRN Temp greater or equal to 38C (100.4F), Mild Pain (1 - 3)  albuterol/ipratropium for Nebulization 3 milliLiter(s) Nebulizer every 6 hours PRN Shortness of Breath and/or Wheezing  dextrose Oral Gel 15 Gram(s) Oral once PRN Blood Glucose LESS THAN 70 milliGRAM(s)/deciliter  LORazepam   Injectable 2 milliGRAM(s) IV Push every 4 hours PRN Agitation         Vitals log        ICU Vital Signs Last 24 Hrs  T(C): 37.6 (09 Jun 2022 04:43), Max: 37.6 (09 Jun 2022 04:43)  T(F): 99.6 (09 Jun 2022 04:43), Max: 99.6 (09 Jun 2022 04:43)  HR: 106 (09 Jun 2022 05:33) (64 - 111)  BP: 122/79 (09 Jun 2022 04:00) (88/53 - 139/78)  BP(mean): 88 (09 Jun 2022 04:00) (65 - 97)  ABP: --  ABP(mean): --  RR: 29 (09 Jun 2022 04:43) (21 - 29)  SpO2: 99% (09 Jun 2022 05:33) (97% - 100%)       Mode: AC/ CMV (Assist Control/ Continuous Mandatory Ventilation)  RR (machine): 24  TV (machine): 400  FiO2: 30  PEEP: 5  ITime: 0.9  MAP: 15  PIP: 27      Input and Output:  I&O's Detail    07 Jun 2022 07:01  -  08 Jun 2022 07:00  --------------------------------------------------------  IN:    Free Water: 355 mL    Glucerna 1.5: 780 mL    IV PiggyBack: 250 mL  Total IN: 1385 mL    OUT:  Total OUT: 0 mL    Total NET: 1385 mL      08 Jun 2022 07:01  -  09 Jun 2022 06:42  --------------------------------------------------------  IN:    Free Water: 225 mL    Glucerna 1.5: 540 mL    IV PiggyBack: 100 mL  Total IN: 865 mL    OUT:    Incontinent per Collection Bag (mL): 500 mL  Total OUT: 500 mL    Total NET: 365 mL          Lab Data                        11.7   11.65 )-----------( 318      ( 09 Jun 2022 06:00 )             38.4     06-08    136  |  98  |  45<H>  ----------------------------<  144<H>  5.3   |  27  |  1.25    Ca    9.6      08 Jun 2022 06:00    TPro  7.8  /  Alb  2.5<L>  /  TBili  0.6  /  DBili  x   /  AST  14  /  ALT  17  /  AlkPhos  144<H>  06-08            Review of Systems	      Objective     Physical Examination    heart s1s2  lung dec BS  abd soft      Pertinent Lab findings & Imaging      Annalise:  NO   Adequate UO     I&O's Detail    07 Jun 2022 07:01  -  08 Jun 2022 07:00  --------------------------------------------------------  IN:    Free Water: 355 mL    Glucerna 1.5: 780 mL    IV PiggyBack: 250 mL  Total IN: 1385 mL    OUT:  Total OUT: 0 mL    Total NET: 1385 mL      08 Jun 2022 07:01  -  09 Jun 2022 06:42  --------------------------------------------------------  IN:    Free Water: 225 mL    Glucerna 1.5: 540 mL    IV PiggyBack: 100 mL  Total IN: 865 mL    OUT:    Incontinent per Collection Bag (mL): 500 mL  Total OUT: 500 mL    Total NET: 365 mL               Discussed with:     Cultures:	        Radiology

## 2022-06-09 NOTE — DISCHARGE NOTE PROVIDER - CARE PROVIDER_API CALL
Paul Merrill  INTERNAL MEDICINE  [address] [place] [address], [address]  [place] [address]  Phone: [phone]  Fax: [phone]  Follow Up Time: 1-3 days

## 2022-06-09 NOTE — DISCHARGE NOTE PROVIDER - HOSPITAL COURSE
FROM ADMISSION H+P:   HPI:  This is an 80 y/o F with PMH of HTN, DM type 2, Cardiac Arrest, Core Pulmonale, CVA, COPD, Chronic Respiratory Failure Vent Dependant s/p trach & PEG, COVID-19 Infection, CKD, Anemia, GERD, and Dementia who was sent from [facility] facility for acute respiratory distress. Patient was discharged this afternoon from [facility] after 10 days admission for acute on chronic respiratory failure 2ry to PNA with parapneumonic pleural effusion, had thoracentesis on May 25th, and completed a course of Avycaz 2 days prior to discharge, then monitored off antibiotics till discharge without spiking any fever. Patient is non communicating, no further history could be obtained at this time. (01 Jun 2022 23:29)      ---  HOSPITAL COURSE:   Acute Respiratory Distress  Patient discharged 6/1/22 and repeat CT showing increase pleural effusions; Continue IV Zosyn, Ertapenem added  Disucssed with ID, plan for 5-7 days of Ertapenem  [facility] able to administer abx to complete the course, will send with Hep Lock   Treated with Abx for PNA on prior admission and had Thoracentesis done 1500cc on Left Side, suggestive of exudative effusion   Had thoracentesis with 350cc removed this admission  TTE- Normal LV, dilated RV, mod pulm htn, small pericardial effusion  PO Lasix daily   Pulmonary and Cardiology Input appreciated    Anemia of Chronic Disease with Iron Deficiency  Has been evaluated by GI and Hematology on prior admissions  She has Anemia of Chronic Disease but could also have intermittent GI Bleeding; Occult Blood Negative  S/P 2U PRBC Transfusion and IV Venofer on prior admission  Multivitamin daily    Acute Renal Failure on CKD 3  Baseline Cr around 1.2  Renally dose and monitor BMP and electrolytes     HTN  Resume home medications on discharge    Diet  Tube Feeds via PEG  PEG was leaking and replaced on 6/3/22    ---  TIME SPENT:  I, the attending physician, was physically present for the key portions of the evaluation and management (E/M) service provided. The total amount of time spent reviewing the hospital notes, laboratory values, imaging findings, assessing/counseling the patient, discussing with consultant physicians, social work, nursing staff was 45 minutes

## 2022-06-10 VITALS
DIASTOLIC BLOOD PRESSURE: 62 MMHG | SYSTOLIC BLOOD PRESSURE: 124 MMHG | RESPIRATION RATE: 19 BRPM | OXYGEN SATURATION: 100 % | HEART RATE: 68 BPM

## 2022-06-10 LAB
ANION GAP SERPL CALC-SCNC: 8 MMOL/L — SIGNIFICANT CHANGE UP (ref 5–17)
BASOPHILS # BLD AUTO: 0.03 K/UL — SIGNIFICANT CHANGE UP (ref 0–0.2)
BASOPHILS NFR BLD AUTO: 0.4 % — SIGNIFICANT CHANGE UP (ref 0–2)
BUN SERPL-MCNC: 47 MG/DL — HIGH (ref 7–23)
CALCIUM SERPL-MCNC: 9.1 MG/DL — SIGNIFICANT CHANGE UP (ref 8.4–10.5)
CHLORIDE SERPL-SCNC: 99 MMOL/L — SIGNIFICANT CHANGE UP (ref 96–108)
CO2 SERPL-SCNC: 26 MMOL/L — SIGNIFICANT CHANGE UP (ref 22–31)
CREAT SERPL-MCNC: 1.35 MG/DL — HIGH (ref 0.5–1.3)
EGFR: 40 ML/MIN/1.73M2 — LOW
EOSINOPHIL # BLD AUTO: 0.28 K/UL — SIGNIFICANT CHANGE UP (ref 0–0.5)
EOSINOPHIL NFR BLD AUTO: 4.1 % — SIGNIFICANT CHANGE UP (ref 0–6)
GLUCOSE SERPL-MCNC: 192 MG/DL — HIGH (ref 70–99)
HCT VFR BLD CALC: 37 % — SIGNIFICANT CHANGE UP (ref 34.5–45)
HGB BLD-MCNC: 11.3 G/DL — LOW (ref 11.5–15.5)
IMM GRANULOCYTES NFR BLD AUTO: 0.7 % — SIGNIFICANT CHANGE UP (ref 0–1.5)
LYMPHOCYTES # BLD AUTO: 1.7 K/UL — SIGNIFICANT CHANGE UP (ref 1–3.3)
LYMPHOCYTES # BLD AUTO: 24.8 % — SIGNIFICANT CHANGE UP (ref 13–44)
MCHC RBC-ENTMCNC: 26.5 PG — LOW (ref 27–34)
MCHC RBC-ENTMCNC: 30.5 GM/DL — LOW (ref 32–36)
MCV RBC AUTO: 86.9 FL — SIGNIFICANT CHANGE UP (ref 80–100)
MONOCYTES # BLD AUTO: 0.67 K/UL — SIGNIFICANT CHANGE UP (ref 0–0.9)
MONOCYTES NFR BLD AUTO: 9.8 % — SIGNIFICANT CHANGE UP (ref 2–14)
NEUTROPHILS # BLD AUTO: 4.12 K/UL — SIGNIFICANT CHANGE UP (ref 1.8–7.4)
NEUTROPHILS NFR BLD AUTO: 60.2 % — SIGNIFICANT CHANGE UP (ref 43–77)
NRBC # BLD: 0 /100 WBCS — SIGNIFICANT CHANGE UP (ref 0–0)
PLATELET # BLD AUTO: 264 K/UL — SIGNIFICANT CHANGE UP (ref 150–400)
POTASSIUM SERPL-MCNC: 4.3 MMOL/L — SIGNIFICANT CHANGE UP (ref 3.5–5.3)
POTASSIUM SERPL-SCNC: 4.3 MMOL/L — SIGNIFICANT CHANGE UP (ref 3.5–5.3)
RBC # BLD: 4.26 M/UL — SIGNIFICANT CHANGE UP (ref 3.8–5.2)
RBC # FLD: 19.2 % — HIGH (ref 10.3–14.5)
SODIUM SERPL-SCNC: 133 MMOL/L — LOW (ref 135–145)
WBC # BLD: 6.85 K/UL — SIGNIFICANT CHANGE UP (ref 3.8–10.5)
WBC # FLD AUTO: 6.85 K/UL — SIGNIFICANT CHANGE UP (ref 3.8–10.5)

## 2022-06-10 PROCEDURE — 99291 CRITICAL CARE FIRST HOUR: CPT | Mod: 25

## 2022-06-10 PROCEDURE — 94002 VENT MGMT INPAT INIT DAY: CPT

## 2022-06-10 PROCEDURE — 94799 UNLISTED PULMONARY SVC/PX: CPT

## 2022-06-10 PROCEDURE — 83735 ASSAY OF MAGNESIUM: CPT

## 2022-06-10 PROCEDURE — 84145 PROCALCITONIN (PCT): CPT

## 2022-06-10 PROCEDURE — 36415 COLL VENOUS BLD VENIPUNCTURE: CPT

## 2022-06-10 PROCEDURE — 85610 PROTHROMBIN TIME: CPT

## 2022-06-10 PROCEDURE — 83605 ASSAY OF LACTIC ACID: CPT

## 2022-06-10 PROCEDURE — 84311 SPECTROPHOTOMETRY: CPT

## 2022-06-10 PROCEDURE — 0225U NFCT DS DNA&RNA 21 SARSCOV2: CPT

## 2022-06-10 PROCEDURE — 87075 CULTR BACTERIA EXCEPT BLOOD: CPT

## 2022-06-10 PROCEDURE — 89051 BODY FLUID CELL COUNT: CPT

## 2022-06-10 PROCEDURE — 71250 CT THORAX DX C-: CPT | Mod: MG

## 2022-06-10 PROCEDURE — 82803 BLOOD GASES ANY COMBINATION: CPT

## 2022-06-10 PROCEDURE — 81001 URINALYSIS AUTO W/SCOPE: CPT

## 2022-06-10 PROCEDURE — 32555 ASPIRATE PLEURA W/ IMAGING: CPT

## 2022-06-10 PROCEDURE — 84443 ASSAY THYROID STIM HORMONE: CPT

## 2022-06-10 PROCEDURE — 93005 ELECTROCARDIOGRAM TRACING: CPT

## 2022-06-10 PROCEDURE — 36600 WITHDRAWAL OF ARTERIAL BLOOD: CPT

## 2022-06-10 PROCEDURE — 87015 SPECIMEN INFECT AGNT CONCNTJ: CPT

## 2022-06-10 PROCEDURE — 87640 STAPH A DNA AMP PROBE: CPT

## 2022-06-10 PROCEDURE — 87186 SC STD MICRODIL/AGAR DIL: CPT

## 2022-06-10 PROCEDURE — 82945 GLUCOSE OTHER FLUID: CPT

## 2022-06-10 PROCEDURE — C1729: CPT

## 2022-06-10 PROCEDURE — 87116 MYCOBACTERIA CULTURE: CPT

## 2022-06-10 PROCEDURE — 82962 GLUCOSE BLOOD TEST: CPT

## 2022-06-10 PROCEDURE — 87641 MR-STAPH DNA AMP PROBE: CPT

## 2022-06-10 PROCEDURE — G1004: CPT

## 2022-06-10 PROCEDURE — 83880 ASSAY OF NATRIURETIC PEPTIDE: CPT

## 2022-06-10 PROCEDURE — 99239 HOSP IP/OBS DSCHRG MGMT >30: CPT

## 2022-06-10 PROCEDURE — 87102 FUNGUS ISOLATION CULTURE: CPT

## 2022-06-10 PROCEDURE — 85027 COMPLETE CBC AUTOMATED: CPT

## 2022-06-10 PROCEDURE — 82042 OTHER SOURCE ALBUMIN QUAN EA: CPT

## 2022-06-10 PROCEDURE — 94760 N-INVAS EAR/PLS OXIMETRY 1: CPT

## 2022-06-10 PROCEDURE — 84157 ASSAY OF PROTEIN OTHER: CPT

## 2022-06-10 PROCEDURE — 83615 LACTATE (LD) (LDH) ENZYME: CPT

## 2022-06-10 PROCEDURE — 71045 X-RAY EXAM CHEST 1 VIEW: CPT

## 2022-06-10 PROCEDURE — 85025 COMPLETE CBC W/AUTO DIFF WBC: CPT

## 2022-06-10 PROCEDURE — 87635 SARS-COV-2 COVID-19 AMP PRB: CPT

## 2022-06-10 PROCEDURE — 94003 VENT MGMT INPAT SUBQ DAY: CPT

## 2022-06-10 PROCEDURE — 87206 SMEAR FLUORESCENT/ACID STAI: CPT

## 2022-06-10 PROCEDURE — 84484 ASSAY OF TROPONIN QUANT: CPT

## 2022-06-10 PROCEDURE — 80048 BASIC METABOLIC PNL TOTAL CA: CPT

## 2022-06-10 PROCEDURE — 88108 CYTOPATH CONCENTRATE TECH: CPT

## 2022-06-10 PROCEDURE — 87086 URINE CULTURE/COLONY COUNT: CPT

## 2022-06-10 PROCEDURE — 88305 TISSUE EXAM BY PATHOLOGIST: CPT

## 2022-06-10 PROCEDURE — 84100 ASSAY OF PHOSPHORUS: CPT

## 2022-06-10 PROCEDURE — 87040 BLOOD CULTURE FOR BACTERIA: CPT

## 2022-06-10 PROCEDURE — 83986 ASSAY PH BODY FLUID NOS: CPT

## 2022-06-10 PROCEDURE — 87205 SMEAR GRAM STAIN: CPT

## 2022-06-10 PROCEDURE — 80053 COMPREHEN METABOLIC PANEL: CPT

## 2022-06-10 PROCEDURE — 87077 CULTURE AEROBIC IDENTIFY: CPT

## 2022-06-10 PROCEDURE — 87070 CULTURE OTHR SPECIMN AEROBIC: CPT

## 2022-06-10 RX ORDER — ERTAPENEM SODIUM 1 G/1
1 INJECTION, POWDER, LYOPHILIZED, FOR SOLUTION INTRAMUSCULAR; INTRAVENOUS
Qty: 0 | Refills: 0 | DISCHARGE
Start: 2022-06-10

## 2022-06-10 RX ORDER — FUROSEMIDE 40 MG
1 TABLET ORAL
Qty: 0 | Refills: 0 | DISCHARGE
Start: 2022-06-10

## 2022-06-10 RX ADMIN — Medication 1 APPLICATION(S): at 11:23

## 2022-06-10 RX ADMIN — SIMETHICONE 80 MILLIGRAM(S): 80 TABLET, CHEWABLE ORAL at 01:17

## 2022-06-10 RX ADMIN — METHOCARBAMOL 500 MILLIGRAM(S): 500 TABLET, FILM COATED ORAL at 05:55

## 2022-06-10 RX ADMIN — INSULIN GLARGINE 8 UNIT(S): 100 INJECTION, SOLUTION SUBCUTANEOUS at 05:54

## 2022-06-10 RX ADMIN — Medication 2: at 01:18

## 2022-06-10 RX ADMIN — Medication 2 MILLIGRAM(S): at 13:12

## 2022-06-10 RX ADMIN — Medication 2 MILLIGRAM(S): at 01:17

## 2022-06-10 RX ADMIN — CHLORHEXIDINE GLUCONATE 15 MILLILITER(S): 213 SOLUTION TOPICAL at 05:55

## 2022-06-10 RX ADMIN — Medication 1 APPLICATION(S): at 11:24

## 2022-06-10 RX ADMIN — Medication 1 TABLET(S): at 11:23

## 2022-06-10 RX ADMIN — SIMETHICONE 80 MILLIGRAM(S): 80 TABLET, CHEWABLE ORAL at 11:23

## 2022-06-10 RX ADMIN — Medication 2 MILLIGRAM(S): at 09:10

## 2022-06-10 RX ADMIN — Medication 1 MILLIGRAM(S): at 11:23

## 2022-06-10 RX ADMIN — Medication 2 MILLIGRAM(S): at 05:55

## 2022-06-10 RX ADMIN — PIPERACILLIN AND TAZOBACTAM 25 GRAM(S): 4; .5 INJECTION, POWDER, LYOPHILIZED, FOR SOLUTION INTRAVENOUS at 09:10

## 2022-06-10 RX ADMIN — SIMETHICONE 80 MILLIGRAM(S): 80 TABLET, CHEWABLE ORAL at 05:54

## 2022-06-10 RX ADMIN — PANTOPRAZOLE SODIUM 40 MILLIGRAM(S): 20 TABLET, DELAYED RELEASE ORAL at 11:23

## 2022-06-10 RX ADMIN — NYSTATIN CREAM 1 APPLICATION(S): 100000 CREAM TOPICAL at 05:55

## 2022-06-10 RX ADMIN — Medication 20 MILLIGRAM(S): at 05:55

## 2022-06-10 RX ADMIN — HEPARIN SODIUM 5000 UNIT(S): 5000 INJECTION INTRAVENOUS; SUBCUTANEOUS at 05:55

## 2022-06-10 RX ADMIN — Medication 2: at 05:54

## 2022-06-10 RX ADMIN — PIPERACILLIN AND TAZOBACTAM 25 GRAM(S): 4; .5 INJECTION, POWDER, LYOPHILIZED, FOR SOLUTION INTRAVENOUS at 01:17

## 2022-06-10 RX ADMIN — ERTAPENEM SODIUM 120 MILLIGRAM(S): 1 INJECTION, POWDER, LYOPHILIZED, FOR SOLUTION INTRAMUSCULAR; INTRAVENOUS at 09:10

## 2022-06-10 RX ADMIN — Medication 4: at 11:49

## 2022-06-10 NOTE — PROGRESS NOTE ADULT - ASSESSMENT
The patient is a 76 year old female with a history of HTN, DM, CVA, dementia, chronic respiratory failure s/p trach, PEG, cardiac arrest, anemia who presents with respiratory distress.    Plan:  - Echo 5/30/22 with normal LV systolic function, mod pulm HTN  - Cardiac enzymes negative  - CT chest with moderate bilateral pleural effusions  - Pleural effusions last admission consistent with exudate  - Continue furosemide 20 mg daily  - On ertapenem  - Pulm and ID follow-up

## 2022-06-10 NOTE — PROGRESS NOTE ADULT - ASSESSMENT
AGE/SEX.   76 f  DOA.  6/2/2022  CC .  6/2/2022 Resp distress at [facility]  PMH .  pmh Hosp stay 5/21-6/1/2022 ceftazidime avibactam 1.25x2 5/22- 5/30/2022    pmh Hosp stay given zosyn 4/21  pmh Pleural effsn   5/25/2022 1.5 l clear pl effsn removed left side IR  5/25/2022 g 183 l 144/381 .37 p 3.4/5.3 .64 p 23 l 36   5/25/2022l pl fluid  lymph pred exudate   cyto (-)     pmh TRANSFUSION.  5/23/2022 1 u prbc  pmh Pneumonia    pmh HTN,   pmh DM,   pmh CVA,   pmh  chronic respiratory failure   pmh s/p trach, PEG,   pmh cardiac arrest  REVIEW OF SYMPTOMS      Able to give (reliable) ROS  NO    PHYSICAL EXAM    Has trach  peg    HEENT Unremarkable  atraumatic   RESP Fair air entry EXP prolonged    Harsh breath sound Resp distres mild   CARDIAC S1 S2 No S3     NO JVD    ABDOMEN SOFT BS PRESENT NOT DISTENDED No hepatosplenomegaly   PEDAL EDEMA present No calf tenderness  NO rash     MAIN ISSUES .  CHF HFPEF AOC poa 6/1   VDRF pmh   FEVER 6/3/2022  r PL EFFSN Thorac requested 6/7/2022 6/8/2022 r thorac g 161 l 222 p 4.9 p 10 l 16 .38      COVID/ICU/CODE STATUS.                       COVID  STATUS.    6/2/2022 scv2 (-)        ICU STAY. 6/2/2022  GOC.  6/2/2022 full code     BEST PRACTICE ISSUES.                                                  HEAD OF BED ELEVATION. Yes  DVT PROPHYLAXIS.   6/1 hpsc    SQUIRES PROPHYLAXIS. 6/1 protonix 40                                                                                        DIET.  6/2/2022 glucerna 1.5 1440     VITALS/PO/IO/VENT/DRIPS.   6/10/2022 afeb 67 110/50   6/10/2022 ac 24/400/5/.3      PROBLEM DATA/ASSESSMENT RECOMMENDATIONS (A/R).    HEMODYNAMICS.   Monitor and optimize bp   Target MAP to miguel  65 (+)    RESP.   Monitor and optimize  po   Target po to remain 90-95%    ABG.   6/3/2022  6a 24/400/5/.4 750/41/62     PMH/PSH PROBLEMS.  Management continued/modified as indicated    VENT MANAGEMENT.   HOB elevation  Target Pplat 30 (-)  Target PO 90-95%  Target pH 730 (+)  Daily spontaneous breathing trials   Daily sedation vacation       INFECTION SOURCE DD.   INFECTION A/R.   Genna is status post recent course of abio ceftazidime avibactam 1.25x2 5/22- 5/30/2022    Trach 6/2 1) Mod gnr 2) Mod gn coccobacilli  SERRATIA CARBAPENEM RESISTANT PSEUDOMONAS   Pt has crp in sputum   will follow with id ama  6/7/2022 ertapenem 1x 7d SERRATIA   6/3 zosyn CARBAPENEM RESISTANT  pseudomonas     INFECTION AUGUST.  W 6/2-6/3-6/5-6/9/2022 w 7.5 - 11.9 - 5.4 -  11.6   IAMGING.   Cxr 6/1/2022 diffuse advanced bl infiltrates with moderate basal effsns increased from 5/31   ct chest 6/1/2022 Pulm edema Worsening bl effsns likely reflecting progressive chf Increased bl patchy airspace opac concerning for superimposed mf pneum   MICROBIO.  rvp 6/2 (-)   Trach 6/2 1) Mod gnr 2) Mod gn coccobacilli  SERRATIA CARBAPENEM RESISTANT PSEUDOMONAS   6/3 urine c (-)   6/3 blod c (-)   ABIO.  6/7/2022 ertapenem 1x 7d SERRATIA   6/3 zosyn CARBAPENEM RESISTANT  pseudomonas     COPD.  6/1/2022 duoneb.4  PLEURAL EFFUSIONS  ct chest 6/1/2022 Pulm edema Worsening bl effsns likely reflecting progressive chf   Case dw cts Dr JOSE Kellogg and he feels that at this point pleurex is NOT indicated   will dw ir re thoracentesis   6/8/2022 r thorac g 161 l 222 p 4.9 p 10 l 16 .38    await cyto    CHF.  echo 5/30/2022 ef 65% pasp 60 dialted rv   6/2/2022 lasix 40 -> 6/8 lasix 20   ANEMIA.  Hb 6/2-6/3/2022 Hb 10.6 - 11.3   RENAL.  Na 6/2-6/3/2022 Na 145 - 144  Cr 6/2-6/3-6/4-6/5-6/7-6/9/2022   Cr 1.1- 1.2 - 1.4 - 1.2 -1.3- 1.4   DM.   6/1 riss   6/1 lantus 8  ANXIETY.  6/2/2022 lorazepam 2.6   DECUB.   6/1 collagenase r gluteal wound       TIME SPENT   Over 39 minutes aggregate critical care time spent on encounter; activities included   direct patient care, counseling and/or coordinating care reviewing notes, lab data/ imaging , discussion with multidisciplinary team/ patient  /family and explaining in detail risks, benefits, alternatives  of the recommendations     CHAPINCITO GUIDRY 76 f

## 2022-06-10 NOTE — PROGRESS NOTE ADULT - ASSESSMENT
This is an 80 y/o F with PMH of HTN, DM type 2, Cardiac Arrest, CVA, COPD, Chronic Respiratory Failure Vent Dependant s/p trach & PEG, Multiple Hospital Admissions for Aspiration & vent associated PNA, COVID-19 Infection, Anemia with GI blood loss, GERD, and Advanced Dementia who was sent from [facility] facility for acute respiratory distress.     Sepsis 2/2 VAP/PNA c/b pleural effusions  Pleural Effusions: parapneumonic vs. empyema  - prior sputum CRE P. aeruginosa and MDRO S. marascens  - 5/23 sputum cx w/ moderate CRE Pseudomonas again and mixed GNR alejandro   - s/p avycaz  x7 day completed 5/28  Pt was discharged on 6/1 and then re-admitted again for recurrent pleural effusions. Restarted on zosyn.   - 6/2 sputum cx again w/ CRE Pseudomonas and MDRO S. marascens  Reviewed susceptibilities for above organisms w/ micro lab. Serratia not susceptible to avycaz/zerbaxa   - s/p thoracentesis on 6/8; pleural fluid cx NGTD  Plan:  S/p zosyn for CRE pseudomonas and anaerobic coverage in setting of pleural effusions  C/w ertapenem 1gm q24h for serratia coverage x5 day course w/ last day 6/11, tomorrow  C/w vent management per ICU    Last dose Abx at facility tomorrow  D/c planning per primary team    Infectious Diseases will continue to follow. Please call with any questions.   Andressa Brantley M.D.  [facility], Division of Infectious Diseases [phone]

## 2022-06-10 NOTE — PROGRESS NOTE ADULT - SUBJECTIVE AND OBJECTIVE BOX
Date/Time Patient Seen:  		  Referring MD:   Data Reviewed	       Patient is a 76y old  Female who presents with a chief complaint of Respiratory distress. (09 Jun 2022 17:05)      Subjective/HPI     PAST MEDICAL & SURGICAL HISTORY:  Dementia of frontal lobe type    Aphasic stroke    Diabetes mellitus    Respiratory failure    Hypertension    GERD (gastroesophageal reflux disease)    Constipation    Respiratory failure    CVA (cerebral vascular accident)    HTN (hypertension)    DM (diabetes mellitus)    Advanced dementia    COVID-19 virus detected    Quadriplegia    Pneumonia    Type II diabetes mellitus    Hx of appendectomy    Gastrostomy in place    Tracheostomy in place    Tracheostomy tube present    Feeding by G-tube          Medication list         MEDICATIONS  (STANDING):  chlorhexidine 0.12% Liquid 15 milliLiter(s) Oral Mucosa every 12 hours  collagenase Ointment 1 Application(s) Topical daily  dextrose 5%. 1000 milliLiter(s) (100 mL/Hr) IV Continuous <Continuous>  dextrose 5%. 1000 milliLiter(s) (50 mL/Hr) IV Continuous <Continuous>  dextrose 50% Injectable 25 Gram(s) IV Push once  dextrose 50% Injectable 12.5 Gram(s) IV Push once  dextrose 50% Injectable 25 Gram(s) IV Push once  ertapenem  IVPB 1000 milliGRAM(s) IV Intermittent every 24 hours  ertapenem  IVPB      folic acid 1 milliGRAM(s) Oral daily  furosemide    Tablet 20 milliGRAM(s) Oral daily  glucagon  Injectable 1 milliGRAM(s) IntraMuscular once  heparin   Injectable 5000 Unit(s) SubCutaneous every 12 hours  insulin glargine Injectable (LANTUS) 8 Unit(s) SubCutaneous every morning  insulin lispro (ADMELOG) corrective regimen sliding scale   SubCutaneous every 6 hours  lactobacillus acidophilus 1 Tablet(s) Oral daily  LORazepam     Tablet 2 milliGRAM(s) Oral every 4 hours  methocarbamol 500 milliGRAM(s) Oral two times a day  multivitamin 1 Tablet(s) Oral daily  nystatin Powder 1 Application(s) Topical every 12 hours  pantoprazole  Injectable 40 milliGRAM(s) IV Push daily  piperacillin/tazobactam IVPB.. 3.375 Gram(s) IV Intermittent every 8 hours  povidone iodine 10% Solution 1 Application(s) Topical daily  senna 3 Tablet(s) Oral at bedtime  simethicone 80 milliGRAM(s) Chew every 6 hours    MEDICATIONS  (PRN):  acetaminophen    Suspension .. 650 milliGRAM(s) Oral every 6 hours PRN Temp greater or equal to 38C (100.4F), Mild Pain (1 - 3)  albuterol/ipratropium for Nebulization 3 milliLiter(s) Nebulizer every 6 hours PRN Shortness of Breath and/or Wheezing  dextrose Oral Gel 15 Gram(s) Oral once PRN Blood Glucose LESS THAN 70 milliGRAM(s)/deciliter  LORazepam   Injectable 2 milliGRAM(s) IV Push every 4 hours PRN Agitation         Vitals log        ICU Vital Signs Last 24 Hrs  T(C): 37 (10 Zay 2022 04:36), Max: 37.2 (10 Zay 2022 00:11)  T(F): 98.6 (10 Zay 2022 04:36), Max: 99 (10 Zay 2022 00:11)  HR: 74 (10 Zay 2022 06:00) (70 - 93)  BP: 118/57 (10 Zay 2022 06:00) (89/43 - 139/57)  BP(mean): 75 (10 Zay 2022 06:00) (57 - 89)  ABP: --  ABP(mean): --  RR: 24 (10 Zay 2022 06:00) (21 - 32)  SpO2: 100% (10 Zay 2022 06:00) (100% - 100%)       Mode: AC/ CMV (Assist Control/ Continuous Mandatory Ventilation)  RR (machine): 24  TV (machine): 400  FiO2: 30  PEEP: 5  ITime: 0.9  MAP: 12  PIP: 24      Input and Output:  I&O's Detail    08 Jun 2022 07:01  -  09 Jun 2022 07:00  --------------------------------------------------------  IN:    Free Water: 300 mL    Glucerna 1.5: 720 mL    IV PiggyBack: 100 mL  Total IN: 1120 mL    OUT:    Incontinent per Collection Bag (mL): 500 mL  Total OUT: 500 mL    Total NET: 620 mL      09 Jun 2022 07:01  -  10 Zay 2022 06:41  --------------------------------------------------------  IN:    Free Water: 550 mL    Glucerna 1.5: 1320 mL    IV PiggyBack: 100 mL  Total IN: 1970 mL    OUT:    Voided (mL): 700 mL  Total OUT: 700 mL    Total NET: 1270 mL          Lab Data                        11.3   6.85  )-----------( 264      ( 10 Zay 2022 06:00 )             37.0     06-10    133<L>  |  99  |  47<H>  ----------------------------<  192<H>  4.3   |  26  |  1.35<H>    Ca    9.1      10 Zay 2022 06:00  Phos  4.5     06-09  Mg     2.4     06-09    TPro  7.8  /  Alb  2.5<L>  /  TBili  0.5  /  DBili  x   /  AST  13  /  ALT  16  /  AlkPhos  144<H>  06-09            Review of Systems	      Objective     Physical Examination    heart s1s2  lung dec BS  abd soft      Pertinent Lab findings & Imaging      Annalise:  NO   Adequate UO     I&O's Detail    08 Jun 2022 07:01  -  09 Jun 2022 07:00  --------------------------------------------------------  IN:    Free Water: 300 mL    Glucerna 1.5: 720 mL    IV PiggyBack: 100 mL  Total IN: 1120 mL    OUT:    Incontinent per Collection Bag (mL): 500 mL  Total OUT: 500 mL    Total NET: 620 mL      09 Jun 2022 07:01  -  10 Jun 2022 06:41  --------------------------------------------------------  IN:    Free Water: 550 mL    Glucerna 1.5: 1320 mL    IV PiggyBack: 100 mL  Total IN: 1970 mL    OUT:    Voided (mL): 700 mL  Total OUT: 700 mL    Total NET: 1270 mL               Discussed with:     Cultures:	        Radiology

## 2022-06-10 NOTE — PROGRESS NOTE ADULT - SUBJECTIVE AND OBJECTIVE BOX
[facility], Division of Infectious Diseases  MOIZ Lora Y. Patel, S. Shah, [facility]  [phone]    Name: BREA BECKHAM  Age: 76y  Gender: Female  MRN: [id number]    Interval History:  Patient seen and examined at bedside this afternoon  No acute overnight events. Afebrile  Notes reviewed.     Antibiotics:  ertapenem  IVPB 1000 milliGRAM(s) IV Intermittent every 24 hours  ertapenem  IVPB      piperacillin/tazobactam IVPB.. 3.375 Gram(s) IV Intermittent every 8 hours      Medications:  acetaminophen    Suspension .. 650 milliGRAM(s) Oral every 6 hours PRN  albuterol/ipratropium for Nebulization 3 milliLiter(s) Nebulizer every 6 hours PRN  chlorhexidine 0.12% Liquid 15 milliLiter(s) Oral Mucosa every 12 hours  collagenase Ointment 1 Application(s) Topical daily  dextrose 5%. 1000 milliLiter(s) IV Continuous <Continuous>  dextrose 5%. 1000 milliLiter(s) IV Continuous <Continuous>  dextrose 50% Injectable 25 Gram(s) IV Push once  dextrose 50% Injectable 12.5 Gram(s) IV Push once  dextrose 50% Injectable 25 Gram(s) IV Push once  dextrose Oral Gel 15 Gram(s) Oral once PRN  ertapenem  IVPB 1000 milliGRAM(s) IV Intermittent every 24 hours  ertapenem  IVPB      folic acid 1 milliGRAM(s) Oral daily  furosemide    Tablet 20 milliGRAM(s) Oral daily  glucagon  Injectable 1 milliGRAM(s) IntraMuscular once  heparin   Injectable 5000 Unit(s) SubCutaneous every 12 hours  insulin glargine Injectable (LANTUS) 8 Unit(s) SubCutaneous every morning  insulin lispro (ADMELOG) corrective regimen sliding scale   SubCutaneous every 6 hours  lactobacillus acidophilus 1 Tablet(s) Oral daily  LORazepam     Tablet 2 milliGRAM(s) Oral every 4 hours  LORazepam   Injectable 2 milliGRAM(s) IV Push every 4 hours PRN  methocarbamol 500 milliGRAM(s) Oral two times a day  multivitamin 1 Tablet(s) Oral daily  nystatin Powder 1 Application(s) Topical every 12 hours  pantoprazole  Injectable 40 milliGRAM(s) IV Push daily  piperacillin/tazobactam IVPB.. 3.375 Gram(s) IV Intermittent every 8 hours  povidone iodine 10% Solution 1 Application(s) Topical daily  senna 3 Tablet(s) Oral at bedtime  simethicone 80 milliGRAM(s) Chew every 6 hours      Review of Systems:  unable to obtain    Allergies: codeine (Hives)    For details regarding the patient's past medical history, social history, family history, and other miscellaneous elements, please refer the initial infectious diseases consultation and/or the admitting history and physical examination for this admission.    Objective:  Vitals:   T(C): 36.2 (06-09-22 @ 08:43), Max: 37.6 (06-09-22 @ 04:43)  HR: 79 (06-09-22 @ 08:00) (64 - 111)  BP: 104/61 (06-09-22 @ 08:00) (88/53 - 139/78)  RR: 24 (06-09-22 @ 08:00) (21 - 29)  SpO2: 100% (06-09-22 @ 08:00) (97% - 100%)    Mode: prvc  RR (machine): 24  TV (machine): 400  FiO2: 30  PEEP: 5  MAP: 11  PIP: 21    Physical Examination:  General: no acute distress  HEENT: NC/AT, EOMI  Neck: trach to vent  Cardio: S1, S2 heard, RRR, no murmurs  Resp: MV breath sounds  Abd: soft, NT, ND, PGT in place  Ext: no edema or cyanosis  Skin: warm, dry, no visible rash    Laboratory Studies:                        11.3   6.85  )-----------( 264      ( 10 Zay 2022 06:00 )             37.0   06-10    133<L>  |  99  |  47<H>  ----------------------------<  192<H>  4.3   |  26  |  1.35<H>    Ca    9.1      10 Zay 2022 06:00  Phos  4.5     06-09  Mg     2.4     06-09    TPro  7.8  /  Alb  2.5<L>  /  TBili  0.5  /  DBili  x   /  AST  13  /  ALT  16  /  AlkPhos  144<H>  06-09      Microbiology: reviewed    Culture - Body Fluid with Gram Stain (collected 06-08-22 @ 11:41)  Source: .Body Fluid Pleural Fluid  Gram Stain (06-08-22 @ 22:22):    polymorphonuclear leukocytes seen    No organisms seen    by cytocentrifuge    Culture - Acid Fast - Body Fluid w/Smear (collected 06-08-22 @ 11:41)  Source: .Body Fluid Pleural Fluid    Culture - Urine (collected 06-03-22 @ 21:53)  Source: Catheterized Catheterized  Final Report (06-05-22 @ 17:10):    <10,000 CFU/mL Normal Urogenital Elvira    Culture - Blood (collected 06-03-22 @ 18:12)  Source: .Blood Blood-Peripheral  Final Report (06-09-22 @ 01:01):    No Growth Final    Culture - Blood (collected 06-03-22 @ 18:12)  Source: .Blood Blood-Peripheral  Final Report (06-09-22 @ 01:01):    No Growth Final    Culture - Sputum (collected 06-02-22 @ 14:57)  Source: Trach Asp Tracheal Aspirate  Gram Stain (06-02-22 @ 23:35):    Rare polymorphonuclear leukocytes per low power field    Rare Squamous epithelial cells per low power field    Moderate Gram Negative Rods per oil power field    Moderate Gram Negative Coccobacilli per oil power field  Final Report (06-05-22 @ 17:27):    Numerous Serratia marcescens    Moderate Pseudomonas aeruginosa (Carbapenem Resistant)    Normal Respiratory Elvira present  Organism: Pseudomonas aeruginosa (Carbapenem Resistant) (06-07-22 @ 09:35)      -  Amikacin: S <=16      -  Aztreonam: S <=4      -  Cefepime: S 4      -  Ceftazidime: S 4      -  Ceftazidime/Avibactam: S <=4      -  Ceftolozane/tazobactam: S <=2      -  Ciprofloxacin: S <=0.25      -  Gentamicin: S <=2      -  Imipenem: R >8      -  Levofloxacin: S <=0.5      -  Meropenem: R 8      -  Piperacillin/Tazobactam: S <=8      -  Tobramycin: S <=2      Method Type: PRATIK  Organism: Serratia marcescens (06-07-22 @ 09:35)      -  Amikacin: S <=16      -  Amoxicillin/Clavulanic Acid: R >16/8      -  Ampicillin: R >16 These ampicillin results predict results for amoxicillin      -  Ampicillin/Sulbactam: R >16/8 Enterobacter, Klebsiella aerogenes, Citrobacter, and Serratia may develop resistance during prolonged therapy (3-4 days)      -  Aztreonam: R >16      -  Cefazolin: R >16 Enterobacter, Klebsiella aerogenes, Citrobacter, and Serratia may develop resistance during prolonged therapy (3-4 days)      -  Cefepime: R >16      -  Cefoxitin: R >16      -  Ceftazidime/Avibactam: R >16      -  Ceftolozane/tazobactam: R >8      -  Ceftriaxone: R >32 Enterobacter, Klebsiella aerogenes, Citrobacter, and Serratia may develop resistance during prolonged therapy      -  Ciprofloxacin: R >2      -  Ertapenem: S <=0.5      -  Gentamicin: S <=2      -  Levofloxacin: R 2      -  Meropenem: S <=1      -  Piperacillin/Tazobactam: I 32      -  Tobramycin: S 4      -  Trimethoprim/Sulfamethoxazole: S <=0.5/9.5      Method Type: PRATIK  Organism: Serratia marcescens  Pseudomonas aeruginosa (Carbapenem Resistant) (06-05-22 @ 17:27)        Radiology: reviewed

## 2022-06-10 NOTE — PROGRESS NOTE ADULT - SUBJECTIVE AND OBJECTIVE BOX
Chief Complaint: Respiratory distress    Interval Events: No events overnight.    Review of Systems:  Unable to obtain    Physical Exam:  Vital Signs Last 24 Hrs  T(C): 37 (10 Zay 2022 04:36), Max: 37.2 (10 Zay 2022 00:11)  T(F): 98.6 (10 Zay 2022 04:36), Max: 99 (10 Zay 2022 00:11)  HR: 77 (10 Zay 2022 10:49) (70 - 92)  BP: 129/56 (10 Zay 2022 10:49) (89/43 - 139/57)  BP(mean): 78 (10 Zay 2022 10:49) (57 - 89)  RR: 26 (10 Zay 2022 10:49) (21 - 32)  SpO2: 100% (10 Zay 2022 10:49) (100% - 100%)  General: NAD  HEENT: Trach  Neck: No JVD, no carotid bruit  Lungs: Coarse bilaterally  CV: RRR, nl S1/S2, no M/R/G  Abdomen: S/NT/ND, +BS  Extremities: No LE edema, no cyanosis  Neuro: AAOx0  Skin: No rash    Labs:    06-10    133<L>  |  99  |  47<H>  ----------------------------<  192<H>  4.3   |  26  |  1.35<H>    Ca    9.1      10 Zay 2022 06:00  Phos  4.5     06-09  Mg     2.4     06-09    TPro  7.8  /  Alb  2.5<L>  /  TBili  0.5  /  DBili  x   /  AST  13  /  ALT  16  /  AlkPhos  144<H>  06-09                        11.3   6.85  )-----------( 264      ( 10 Zay 2022 06:00 )             37.0     Telemetry: Sinus rhythm

## 2022-06-10 NOTE — PROGRESS NOTE ADULT - SUBJECTIVE AND OBJECTIVE BOX
BREA BECKHAM    [facility]    Allergies    codeine (Hives)    Intolerances        PAST MEDICAL & SURGICAL HISTORY:  Dementia of frontal lobe type      Aphasic stroke      Diabetes mellitus      Respiratory failure      Hypertension      GERD (gastroesophageal reflux disease)      Constipation      Respiratory failure      CVA (cerebral vascular accident)      HTN (hypertension)      DM (diabetes mellitus)      Advanced dementia      COVID-19 virus detected      Quadriplegia      Pneumonia      Type II diabetes mellitus      Hx of appendectomy      Gastrostomy in place      Tracheostomy in place      Tracheostomy tube present      Feeding by G-tube          FAMILY HISTORY:  No pertinent family history in first degree relatives        Home Medications:  albuterol 90 mcg/inh inhalation aerosol with adapter: 2  inhaled every 6 hours (21 May 2022 21:16)  Bacid (LAC) oral tablet: 2 tab(s) by gastrostomy tube once a day (21 May 2022 21:16)  Betadine 10% topical swab: cleanse right and left great toes (21 May 2022 21:16)  Carafate 1 g/10 mL oral suspension: 10 milliliter(s) by gastrostomy tube 4 times a day (before meals and at bedtime) for 14 days (Started 6/4/21) (21 May 2022 21:16)  chlorhexidine 0.12% mucous membrane liquid: 15 milliliter(s) mucous membrane 2 times a day (21 May 2022 21:16)  Eucerin topical cream: Apply topically to affected area once a day bilateral feet (21 May 2022 21:16)  folic acid 1 mg oral tablet: 1 tab(s) orally once a day (21 May 2022 21:16)  insulin glargine 100 units/mL subcutaneous solution: 8 unit(s) subcutaneous once a day (in the morning) (01 Jun 2022 08:24)  ipratropium-albuterol 0.5 mg-2.5 mg/3 mL inhalation solution: 3 milliliter(s) inhaled 4 times a day (21 May 2022 21:16)  LORazepam 1 mg oral tablet: 1 tab(s) by gastrostomy tube every 4 hours (21 May 2022 21:16)  methocarbamol 500 mg oral tablet: 1 tab(s) by gastrostomy tube 2 times a day (21 May 2022 21:16)  MiraLax oral powder for reconstitution:  (21 May 2022 23:14)  Multiple Vitamins oral tablet: 1 tab(s) orally once a day (21 May 2022 21:16)  nystatin 100,000 units/g topical powder: 1 application topically 3 times a day (29 May 2022 16:35)  omeprazole 20 mg oral delayed release capsule: orally 2 times a day (21 May 2022 23:14)  polyethylene glycol 3350 oral powder for reconstitution: 17 gram(s) by gastrostomy tube every 12 hours (21 May 2022 21:16)  senna 8.6 mg oral tablet: 3 tab(s) by gastrostomy tube once a day (at bedtime) (21 May 2022 21:16)  simethicone 80 mg oral tablet, chewable: 1 tab(s) by gastrostomy tube every 6 hours (21 May 2022 21:16)  Tylenol 325 mg oral tablet: 2 tab(s) by gastrostomy tube once a day; 60 minutes prior to dressing change  (21 May 2022 21:16)  Tylenol 325 mg oral tablet: 2 tab(s) by gastrostomy tube every 6 hours, As Needed (21 May 2022 21:16)      MEDICATIONS  (STANDING):  chlorhexidine 0.12% Liquid 15 milliLiter(s) Oral Mucosa every 12 hours  collagenase Ointment 1 Application(s) Topical daily  dextrose 5%. 1000 milliLiter(s) (100 mL/Hr) IV Continuous <Continuous>  dextrose 5%. 1000 milliLiter(s) (50 mL/Hr) IV Continuous <Continuous>  dextrose 50% Injectable 25 Gram(s) IV Push once  dextrose 50% Injectable 12.5 Gram(s) IV Push once  dextrose 50% Injectable 25 Gram(s) IV Push once  ertapenem  IVPB 1000 milliGRAM(s) IV Intermittent every 24 hours  ertapenem  IVPB      folic acid 1 milliGRAM(s) Oral daily  furosemide    Tablet 20 milliGRAM(s) Oral daily  glucagon  Injectable 1 milliGRAM(s) IntraMuscular once  heparin   Injectable 5000 Unit(s) SubCutaneous every 12 hours  insulin glargine Injectable (LANTUS) 8 Unit(s) SubCutaneous every morning  insulin lispro (ADMELOG) corrective regimen sliding scale   SubCutaneous every 6 hours  lactobacillus acidophilus 1 Tablet(s) Oral daily  LORazepam     Tablet 2 milliGRAM(s) Oral every 4 hours  methocarbamol 500 milliGRAM(s) Oral two times a day  multivitamin 1 Tablet(s) Oral daily  nystatin Powder 1 Application(s) Topical every 12 hours  pantoprazole  Injectable 40 milliGRAM(s) IV Push daily  piperacillin/tazobactam IVPB.. 3.375 Gram(s) IV Intermittent every 8 hours  povidone iodine 10% Solution 1 Application(s) Topical daily  senna 3 Tablet(s) Oral at bedtime  simethicone 80 milliGRAM(s) Chew every 6 hours    MEDICATIONS  (PRN):  acetaminophen    Suspension .. 650 milliGRAM(s) Oral every 6 hours PRN Temp greater or equal to 38C (100.4F), Mild Pain (1 - 3)  albuterol/ipratropium for Nebulization 3 milliLiter(s) Nebulizer every 6 hours PRN Shortness of Breath and/or Wheezing  dextrose Oral Gel 15 Gram(s) Oral once PRN Blood Glucose LESS THAN 70 milliGRAM(s)/deciliter  LORazepam   Injectable 2 milliGRAM(s) IV Push every 4 hours PRN Agitation      Diet, NPO with Tube Feed:   Tube Feeding Modality: Gastrostomy  Glucerna 1.5 Horacio  Total Volume for 24 Hours (mL): 1200  Continuous  Until Goal Tube Feed Rate (mL per Hour): 60  Tube Feed Duration (in Hours): 20  Tube Feed Start Time: 00:00  Free Water Flush  Pump   Rate (mL per Hour): 25   Frequency: Every Hour    Duration (Hours): 24 (06-02-22 @ 11:46) [Active]          Vital Signs Last 24 Hrs  T(C): 37 (10 Zay 2022 04:36), Max: 37.2 (10 Zay 2022 00:11)  T(F): 98.6 (10 Zay 2022 04:36), Max: 99 (10 Zay 2022 00:11)  HR: 72 (10 Zay 2022 08:00) (70 - 92)  BP: 116/48 (10 Zay 2022 08:00) (89/43 - 139/57)  BP(mean): 69 (10 Zay 2022 08:00) (57 - 89)  RR: 24 (10 Zay 2022 08:00) (21 - 32)  SpO2: 100% (10 Zay 2022 08:00) (100% - 100%)      06-09-22 @ 07:01  -  06-10-22 @ 07:00  --------------------------------------------------------  IN: 1970 mL / OUT: 700 mL / NET: 1270 mL        Mode: prvc, RR (machine): 24, TV (machine): 400, FiO2: 30, PEEP: 5, MAP: 11, PIP: 21      LABS:                        11.3   6.85  )-----------( 264      ( 10 Zay 2022 06:00 )             37.0     06-10    133<L>  |  99  |  47<H>  ----------------------------<  192<H>  4.3   |  26  |  1.35<H>    Ca    9.1      10 Zay 2022 06:00  Phos  4.5     06-09  Mg     2.4     06-09    TPro  7.8  /  Alb  2.5<L>  /  TBili  0.5  /  DBili  x   /  AST  13  /  ALT  16  /  AlkPhos  144<H>  06-09              WBC:  WBC Count: 6.85 K/uL (06-10 @ 06:00)  WBC Count: 11.65 K/uL (06-09 @ 06:00)  WBC Count: 7.90 K/uL (06-08 @ 06:00)  WBC Count: 5.74 K/uL (06-07 @ 06:00)      MICROBIOLOGY:  RECENT CULTURES:  06-08 .Body Fluid Pleural Fluid XXXX   polymorphonuclear leukocytes seen  No organisms seen  by cytocentrifuge   No growth to date.    06-03 Catheterized Catheterized XXXX XXXX   <10,000 CFU/mL Normal Urogenital Elvira    06-03 .Blood Blood-Peripheral XXXX XXXX   No Growth Final                    Sodium:  Sodium, Serum: 133 mmol/L (06-10 @ 06:00)  Sodium, Serum: 138 mmol/L (06-09 @ 12:05)  Sodium, Serum: 136 mmol/L (06-09 @ 06:00)  Sodium, Serum: 136 mmol/L (06-08 @ 06:00)  Sodium, Serum: 137 mmol/L (06-07 @ 06:00)      1.35 mg/dL 06-10 @ 06:00  1.40 mg/dL 06-09 @ 12:05  1.46 mg/dL 06-09 @ 06:00  1.25 mg/dL 06-08 @ 06:00  1.30 mg/dL 06-07 @ 06:00      Hemoglobin:  Hemoglobin: 11.3 g/dL (06-10 @ 06:00)  Hemoglobin: 11.7 g/dL (06-09 @ 06:00)  Hemoglobin: 12.4 g/dL (06-08 @ 06:00)  Hemoglobin: 12.2 g/dL (06-07 @ 06:00)      Platelets: Platelet Count - Automated: 264 K/uL (06-10 @ 06:00)  Platelet Count - Automated: 318 K/uL (06-09 @ 06:00)  Platelet Count - Automated: 265 K/uL (06-08 @ 06:00)  Platelet Count - Automated: 284 K/uL (06-07 @ 06:00)      LIVER FUNCTIONS - ( 09 Jun 2022 06:00 )  Alb: 2.5 g/dL / Pro: 7.8 g/dL / ALK PHOS: 144 U/L / ALT: 16 U/L DA / AST: 13 U/L / GGT: x                 RADIOLOGY & ADDITIONAL STUDIES:      MICROBIOLOGY:  RECENT CULTURES:  06-08 .Body Fluid Pleural Fluid XXXX   polymorphonuclear leukocytes seen  No organisms seen  by cytocentrifuge   No growth to date.    06-03 Catheterized Catheterized XXXX XXXX   <10,000 CFU/mL Normal Urogenital Elvira    06-03 .Blood Blood-Peripheral XXXX XXXX   No Growth Final

## 2022-06-10 NOTE — PROGRESS NOTE ADULT - ASSESSMENT
80 y/o F with PMH of HTN, DM type 2, Cardiac Arrest, CVA, COPD, Chronic Respiratory Failure Vent Dependant s/p trach & PEG, Multiple Hospital Admissions for Aspiration & vent associated PNA, COVID-19 Infection, Anemia with GI blood loss, GERD, and Advanced Dementia presented with acute respiratory distress.      on ABX  on LASIX  GI eval noted  Pulm follow up  I and O  pleurocentesis - pleurx discussion - s/p thoracentesis    HCP Marciano -  - pt is full code  s/p emp ABX - broad spectrum for poss PNA  [facility] rx regimen  bronchodilators  oral and skin care  assist with needs - ADL  monitor VS and HD  CT imaging reviewed  Old records reviewed  suction PRN  trach care  peg care  nutritional optimization  decubitus prevention

## 2022-06-10 NOTE — CHART NOTE - NSCHARTNOTEFT_GEN_A_CORE
Patient is medically optimized for discharge. Please see discharge note for additional information regarding the hospital course and the day of discharge.
Assessment:     Pt seen for nutrition follow-up.  Per H&P, pt is a "78 y/o F with PMH of HTN, DM type 2, Cardiac Arrest, Core Pulmonale, CVA, COPD, Chronic Respiratory Failure Vent Dependant s/p trach & PEG, COVID-19 Infection, CKD, Anemia, GERD, and Dementia who was sent from [facility] facility for acute respiratory distress. Patient was discharged this afternoon from [facility] after 10 days admission for acute on chronic respiratory failure 2ry to [facility] with parapneumonic pleural effusion, had thoracentesis on May 25th, and completed a course of Avycaz 2 days prior to discharge, then monitored off antibiotics till discharge without spiking any fever. Patient is non communicating, no further history could be obtained at this time."    Nutrition consult previously ordered for pressure ulcer stage II or >, tube feeding PTA. Pt admitted with unstageable to R gluteal fold, BL great toes, and R lateral foot, SDTI to L posterior foot, stage I to BL heels.  Pt with hx trach/PEG.  Per transfer documents, receiving Glucerna 1.5 to goal 60ml/hr x 20hrs (provides 1200ml TV formula, 1800kcal, 99g protein; also receiving 250ml free water q 6hrs, +25ml hourly flushes). Pt previously receiving tube feeds per MD order of Glucerna 1.5 via peg at goal rate of 60ml/hr x 24 hours (providing 1,440ml TV formula, 2,160kcal, and 118.8g protein); exceeding pt's nutritional needs. As of 6/5, diet order now currently active for Glucerna 1.5 at goal rate of 60ml/hr x 20hrs (to provide 1200 ml TV formula, 1800kcal based on 31kcal/kg IBW, 99g protein based on 1.7g/kg IBW, 912ml free water from formula) and receiving  standard 25ml/hr free water flushes. +BM 6/9. RD to follow-up and will continue to monitor pt's nutrition status/tolerance to EN prescription.    Factors impacting intake: [ ] none [ ] nausea  [ ] vomiting [ ] diarrhea [ ] constipation  [ ]chewing problems [ ] swallowing issues  [X] other: trach/peg    Diet Presciption: Diet, NPO with Tube Feed:   Tube Feeding Modality: Gastrostomy  Glucerna 1.5 Horacio  Total Volume for 24 Hours (mL): 1200  Continuous  Until Goal Tube Feed Rate (mL per Hour): 60  Tube Feed Duration (in Hours): 20  Tube Feed Start Time: 00:00  Free Water Flush  Pump   Rate (mL per Hour): 25   Frequency: Every Hour    Duration (Hours): 24 (06-02-22 @ 11:46)    Intake: TF running at goal rate of 60ml/hr    Current Weight: Weight (kg): 54 (01 Jun 2022 20:16)  % Weight Change  CBW on admission 119#  Transfer wt of 119#  previous adm weight of 117#  6/8 109.5#- ? accuracy of bed scale wts as pt likely did not have ~10# wt loss; on continuous TF; no edema  6/5 109.5#   6/4 110.2#    Pertinent Medications: MEDICATIONS  (STANDING):  chlorhexidine 0.12% Liquid 15 milliLiter(s) Oral Mucosa every 12 hours  collagenase Ointment 1 Application(s) Topical daily  dextrose 5%. 1000 milliLiter(s) (50 mL/Hr) IV Continuous <Continuous>  dextrose 5%. 1000 milliLiter(s) (100 mL/Hr) IV Continuous <Continuous>  dextrose 50% Injectable 25 Gram(s) IV Push once  dextrose 50% Injectable 12.5 Gram(s) IV Push once  dextrose 50% Injectable 25 Gram(s) IV Push once  ertapenem  IVPB 1000 milliGRAM(s) IV Intermittent every 24 hours  ertapenem  IVPB      folic acid 1 milliGRAM(s) Oral daily  furosemide    Tablet 20 milliGRAM(s) Oral daily  glucagon  Injectable 1 milliGRAM(s) IntraMuscular once  heparin   Injectable 5000 Unit(s) SubCutaneous every 12 hours  insulin glargine Injectable (LANTUS) 8 Unit(s) SubCutaneous every morning  insulin lispro (ADMELOG) corrective regimen sliding scale   SubCutaneous every 6 hours  lactobacillus acidophilus 1 Tablet(s) Oral daily  LORazepam     Tablet 2 milliGRAM(s) Oral every 4 hours  methocarbamol 500 milliGRAM(s) Oral two times a day  multivitamin 1 Tablet(s) Oral daily  nystatin Powder 1 Application(s) Topical every 12 hours  pantoprazole  Injectable 40 milliGRAM(s) IV Push daily  piperacillin/tazobactam IVPB.. 3.375 Gram(s) IV Intermittent every 8 hours  povidone iodine 10% Solution 1 Application(s) Topical daily  senna 3 Tablet(s) Oral at bedtime  simethicone 80 milliGRAM(s) Chew every 6 hours    MEDICATIONS  (PRN):  acetaminophen    Suspension .. 650 milliGRAM(s) Oral every 6 hours PRN Temp greater or equal to 38C (100.4F), Mild Pain (1 - 3)  albuterol/ipratropium for Nebulization 3 milliLiter(s) Nebulizer every 6 hours PRN Shortness of Breath and/or Wheezing  dextrose Oral Gel 15 Gram(s) Oral once PRN Blood Glucose LESS THAN 70 milliGRAM(s)/deciliter  LORazepam   Injectable 2 milliGRAM(s) IV Push every 4 hours PRN Agitation    Pertinent Labs: 06-09 Na138 mmol/L Glu 215 mg/dL<H> K+ 4.6 mmol/L Cr  1.40 mg/dL<H> BUN 50 mg/dL<H> 06-09 Phos 4.5 mg/dL 06-09 Alb 2.5 g/dL<L>    CAPILLARY BLOOD GLUCOSE  POCT Blood Glucose.: 201 mg/dL (09 Jun 2022 11:58)  POCT Blood Glucose.: 241 mg/dL (09 Jun 2022 06:29)  POCT Blood Glucose.: 211 mg/dL (08 Jun 2022 23:43)  POCT Blood Glucose.: 200 mg/dL (08 Jun 2022 17:19)    Skin: unstageable to R gluteal fold, BL great toes, and R lateral foot, SDTI to L posterior foot, stage I to BL heels    Estimated Needs:   [X] no change since previous assessment  [ ] recalculated:     Previous Nutrition Diagnosis:   [ ] Inadequate Energy Intake [ ]Inadequate Oral Intake [ ] Excessive Energy Intake   [ ] Underweight [X] Increased Nutrient Needs [ ] Overweight/Obesity   [ ] Altered GI Function [ ] Unintended Weight Loss [ ] Food & Nutrition Related Knowledge Deficit [ ] Malnutrition     Nutrition Diagnosis is [X] ongoing  [ ] resolved [ ] not applicable     New Nutrition Diagnosis: [ ] not applicable     Interventions: Continue nutrition care plan, Glucerna 1.5 via PEG at goal rate of 60ml/hr x 20hrs, standard free water flushes   Recommend  [ ] Change Diet To:  [ ] Nutrition Supplement  [ ] Nutrition Support  [ ] Other:     Monitoring and Evaluation:   [ ] PO intake [ x ] Tolerance to EN prescription [ x ] weights [ x ] labs[ x ] follow up per protocol  [ ] other:
Assessment: Pt seen for nutrition follow-up.  Per H&P, pt is a "78 y/o F with PMH of HTN, DM type 2, Cardiac Arrest, Core Pulmonale, CVA, COPD, Chronic Respiratory Failure Vent Dependant s/p trach & PEG, COVID-19 Infection, CKD, Anemia, GERD, and Dementia who was sent from [facility] facility for acute respiratory distress. Patient was discharged this afternoon from [facility] after 10 days admission for acute on chronic respiratory failure 2ry to PNA with parapneumonic pleural effusion, had thoracentesis on May 25th, and completed a course of Avycaz 2 days prior to discharge, then monitored off antibiotics till discharge without spiking any fever. Patient is non communicating, no further history could be obtained at this time."    6/5  Nutrition consult previously ordered for pressure ulcer stage II or >, tube feeding PTA. Pt admitted with unstageable to R gluteal fold, BL great toes, and R lateral foot, SDTI to L posterior foot, stage I to BL heels.  Pt with hx trach/PEG.  Per transfer documents, receiving Glucerna 1.5 to goal 60ml/hr x 20hrs (provides 1200ml TV formula, 1800kcal, 99g protein; also receiving 250ml free water q 6hrs, +25ml hourly flushes). Pt currently receiving tube feeds per MD order of Glucerna 1.5 via peg at goal rate of 60ml/hr x 24 hours (providing 1,440ml TV formula, 2,160kcal, and 118.8g protein); exceeding pt's nutritional needs. Recommend Glucerna 1.5 at goal rate of 60ml/hr x 20hrs (to provide 1200 ml TV formula, 1800kcal based on 31kcal/kg IBW, 99g protein based on 1.7g/kg IBW, 912ml free water from formula); recommend standard 25ml/hr free water flushes- discussed with MD, TF order changed 6/5.  +BM 6/5. RD to follow-up and will continue to monitor pt's nutrition status/tolerance to EN prescription.    Factors impacting intake: [ ] none [ ] nausea  [ ] vomiting [ ] diarrhea [ ] constipation  [ ]chewing problems [ ] swallowing issues  [x ] other: trach/peg    Diet Prescription: Diet, NPO with Tube Feed:   Tube Feeding Modality: Gastrostomy  Glucerna 1.5 Horacio  Total Volume for 24 Hours (mL): 1200  Continuous  Until Goal Tube Feed Rate (mL per Hour): 60  Tube Feed Duration (in Hours): 20  Tube Feed Start Time: 00:00  Free Water Flush  Pump   Rate (mL per Hour): 25   Frequency: Every Hour    Duration (Hours): 24 (06-02-22 @ 11:46)    Intake: TF @goal 60ml/hr    Current Weight: Weight (kg): 54 (06-01 @ 20:16)  % Weight Change  CBW on admission 119#  Transfer wt of 119#  previous adm weight of 117#  6/5 109.5# - ? accuracy of bed scale wts as pt likely did not have ~10# wt loss; on continuous TF; no edema  6/4 110.2#    Pertinent Medications: MEDICATIONS  (STANDING):  chlorhexidine 0.12% Liquid 15 milliLiter(s) Oral Mucosa every 12 hours  collagenase Ointment 1 Application(s) Topical daily  dextrose 5%. 1000 milliLiter(s) (50 mL/Hr) IV Continuous <Continuous>  dextrose 5%. 1000 milliLiter(s) (100 mL/Hr) IV Continuous <Continuous>  dextrose 50% Injectable 25 Gram(s) IV Push once  dextrose 50% Injectable 12.5 Gram(s) IV Push once  dextrose 50% Injectable 25 Gram(s) IV Push once  folic acid 1 milliGRAM(s) Oral daily  glucagon  Injectable 1 milliGRAM(s) IntraMuscular once  heparin   Injectable 5000 Unit(s) SubCutaneous every 8 hours  insulin glargine Injectable (LANTUS) 8 Unit(s) SubCutaneous every morning  insulin lispro (ADMELOG) corrective regimen sliding scale   SubCutaneous every 6 hours  lactobacillus acidophilus 1 Tablet(s) Oral daily  LORazepam     Tablet 2 milliGRAM(s) Oral every 4 hours  methocarbamol 500 milliGRAM(s) Oral two times a day  multivitamin 1 Tablet(s) Oral daily  nystatin Powder 1 Application(s) Topical every 12 hours  pantoprazole  Injectable 40 milliGRAM(s) IV Push daily  piperacillin/tazobactam IVPB.. 3.375 Gram(s) IV Intermittent every 8 hours  povidone iodine 10% Solution 1 Application(s) Topical daily  senna 3 Tablet(s) Oral at bedtime  simethicone 80 milliGRAM(s) Chew every 6 hours    MEDICATIONS  (PRN):  acetaminophen    Suspension .. 650 milliGRAM(s) Oral every 6 hours PRN Temp greater or equal to 38C (100.4F), Mild Pain (1 - 3)  albuterol/ipratropium for Nebulization 3 milliLiter(s) Nebulizer every 6 hours PRN Shortness of Breath and/or Wheezing  dextrose Oral Gel 15 Gram(s) Oral once PRN Blood Glucose LESS THAN 70 milliGRAM(s)/deciliter  LORazepam   Injectable 2 milliGRAM(s) IV Push every 4 hours PRN Agitation    Pertinent Labs: 06-05 Na143 mmol/L Glu 202 mg/dL<H> K+ 5.3 mmol/L Cr  1.22 mg/dL BUN 47 mg/dL<H> 06-03 Phos 3.9 mg/dL 06-05 Alb 2.1 g/dL<L>     CAPILLARY BLOOD GLUCOSE      POCT Blood Glucose.: 192 mg/dL (05 Jun 2022 05:40)  POCT Blood Glucose.: 168 mg/dL (04 Jun 2022 23:15)  POCT Blood Glucose.: 168 mg/dL (04 Jun 2022 17:42)  POCT Blood Glucose.: 161 mg/dL (04 Jun 2022 11:54)    Skin: unstageable to R gluteal fold, BL great toes, and R lateral foot, SDTI to L posterior foot, stage I to BL heels    Estimated Needs:   [ x] no change since previous assessment  [ ] recalculated:     Previous Nutrition Diagnosis:   [ ] Inadequate Energy Intake [ ]Inadequate Oral Intake [ ] Excessive Energy Intake   [ ] Underweight [ x] Increased Nutrient Needs [ ] Overweight/Obesity   [ ] Altered GI Function [ ] Unintended Weight Loss [ ] Food & Nutrition Related Knowledge Deficit [ ] Malnutrition     Nutrition Diagnosis is [ x] ongoing  [ ] resolved [ ] not applicable     New Nutrition Diagnosis: [ ] not applicable       Interventions: Glucerna 1.5 via PEG, free water flushes   Recommend  [ ] Change Diet To:  [ ] Nutrition Supplement  [ ] Nutrition Support  [ ] Other:     Monitoring and Evaluation:   [ ] PO intake [ x ] Tolerance to EN prescription [ x ] weights [ x ] labs[ x ] follow up per protocol  [ ] other:
Discussed with radiology, ok to restart DVT ppx heparin

## 2022-06-10 NOTE — PROGRESS NOTE ADULT - REASON FOR ADMISSION
Respiratory distress.

## 2022-06-13 LAB
CULTURE RESULTS: SIGNIFICANT CHANGE UP
PH FLD: 8.1 — SIGNIFICANT CHANGE UP
SPECIMEN SOURCE: SIGNIFICANT CHANGE UP

## 2022-06-16 LAB — ADENOSINE DEAMINASE PLR-CCNC: 12 U/L — SIGNIFICANT CHANGE UP (ref 0–30)

## 2022-06-25 LAB
CULTURE RESULTS: SIGNIFICANT CHANGE UP
SPECIMEN SOURCE: SIGNIFICANT CHANGE UP

## 2022-07-09 LAB
CULTURE RESULTS: SIGNIFICANT CHANGE UP
SPECIMEN SOURCE: SIGNIFICANT CHANGE UP

## 2022-07-12 NOTE — PROGRESS NOTE ADULT - ASSESSMENT
[facility] Consult Note    Date of Service: July 12, 2022     Requesting Provider: Dr. Gerry Juares    Chief Complaint: BLE leg swelling and L leg wound    History of Present Illness: Bradley Liz is being seen at [facility] today regarding BLE swelling and L leg wound. They arrive to the clinic today with daughter in law. The patient reports that 3 1/2 weeks ago he noticed swelling, and weeping to his legs. He went to the ER 6/21/22 and they prescribed him Lasix for 10 days. Pt has a history of heart failure. Was currently/previously using gauze, rolled gauze. Reports pain of 0/10; currently using nothing for pain. Has used nothing as compression in the past, is currently using nothing for compression. Denies any fevers, chills, or generalized ill feeling. Denies history of cancer. Sleeps in a bed with legs flat in bed. Denies history of DVT, Joint Replacement, Cellulitis and Vein Procedures. I personally reviewed outside imaging, lab work, and progress noted through Care Everywhere and outside records.      Review of Systems:   Constitutional:  Negative   EENTM: negative for glasses;  positive [place]  GI:  negative for nausea/vomiting;   negative for constipation   negative diarrhea;   negative for fecal incontinence  negative weight loss  :   negative dysuria,  positive incontinence  MS: patient is ambulatory;  does not use assistive devices  Cardiac: positive; a fib  Respiratory:  negative SOB  Endocrine:  negative  diabetes  Psych: negative  depression/anxiety    Past Medical History:   Past Medical History:   Diagnosis Date     Acute kidney injury (H) 8/5/2019        Surgical History:   Past Surgical History:   Procedure Laterality Date     LAPAROSCOPIC CHOLECYSTECTOMY N/A 7/9/2019    Procedure: Laparoscopic cholecystectomy;  Surgeon: Ad Peck MD;  Location:  OR        Medications:   Current Outpatient Medications   Medication Sig      76 y/o female with a PMHx of DM2, pna, quadriplegia, advanced dementia, DM, CVA, GERD, HTN presents to the ED c/o recent admitted to [facility] 9/7-9/15 after being found unresponsive at nursing home, concerned about post operative distress, hypotensive signs of aspirational pna, with volume dissipated, treated with levosa, zosyn. Admitted to ICU for aspirational pna. Urinary culture proteus, which was also sensitive to Zosyn. Also appears to have paronychia of toe which she had Zyvox during hospital stay. She was found to be anemic so she was transfused of 1 L RBC, no signs of active bleeding and pt stabilized. Pt was stabilized and d/c back to rehab. Patient reported for reported respiratory distress. Pt found to be on normal vent settings, does have increased  respiratory rate. No further hx can be obtained at this time as pt is nonverbal.  sepsis - shock - chr resp failure - anemia - acute infection - pna - dysphagia - anoxia - dementia      remains concerned about clinical status and dc planning -   wound care eval noted  CM notes reviewed    on ABX  HOB elev  oral hygiene  skin care  assist with ADL  full code - spoke with  - HCP  cx in progress  ABG noted  vent support  monitor VS and HD and Sat  CCM notes reviewed  vent support in progress - not a candidate for weaning  old records reviewed   amoxicillin-clavulanate (AUGMENTIN) 875-125 MG tablet Take 1 tablet by mouth 2 times daily for 10 days     ASPIRIN NOT PRESCRIBED, INTENTIONAL, by Other route continuous prn.     digoxin (LANOXIN) 125 MCG tablet every 24 hours     furosemide (LASIX) 20 MG tablet every 24 hours     furosemide (LASIX) 20 MG tablet Take 1 tablet (20 mg) by mouth daily for 30 days     metoprolol succinate ER (TOPROL-XL) 200 MG 24 hr tablet Take 1 tablet (200 mg) by mouth daily     metoprolol tartrate (LOPRESSOR) 100 MG tablet metoprolol tartrate 100 mg tablet   Take 1 tablet twice a day by oral route.     order for DME Please draw INR as ordered and as needed. Call results to [facility] at (p) [phone] or fax them to (f) [phone]     potassium chloride ER (K-TAB/KLOR-CON) 10 MEQ CR tablet every 24 hours     rivaroxaban ANTICOAGULANT (XARELTO ANTICOAGULANT) 20 MG TABS tablet Take 1 tablet (20 mg) by mouth daily (with dinner)     rosuvastatin (CRESTOR) 5 MG tablet Take 1 tablet (5 mg) by mouth daily     No current facility-administered medications for this visit.       Allergies: No Known Allergies    Family history:   Family History   Problem Relation Age of Onset     Unknown/Adopted Mother      Prostate Cancer Father      C.A.D. Father         age 77        Social History:   Social History     Socioeconomic History     Marital status:      Spouse name: Not on file     Number of children: Not on file     Years of education: Not on file     Highest education level: Not on file   Occupational History     Not on file   Tobacco Use     Smoking status: Former Smoker     Years: 50.00     Quit date: 2008     Years since quittin.6     Smokeless tobacco: Never Used   Vaping Use     Vaping Use: Never used   Substance and Sexual Activity     Alcohol use: Yes     Comment: couple beers occ     Drug use: No     Sexual activity: Yes     Partners: Female   Other Topics Concern     Parent/sibling w/ CABG, MI  or angioplasty before 65F 55M? No   Social History Narrative     Not on file     Social Determinants of Health     Financial Resource Strain: Not on file   Food Insecurity: Not on file   Transportation Needs: Not on file   Physical Activity: Not on file   Stress: Not on file   Social Connections: Not on file   Intimate Partner Violence: Not on file   Housing Stability: Not on file        Physical Exam  Vitals: /82   Pulse 88   Temp 98.1  F (36.7  C)   Resp 24   Weight is 0 lbs 0 oz          There is no height or weight on file to calculate BMI.  General: This is a 79 year old male who appears their reported age, not in acute distress  Head: normocephalic, Atraumatic; wearing glasses; non-icteric sclera; no exudate; mild hearing loss  Respiratory: Clear throughout all lung fields; unlabored breathing; no cough   Cardiac: Regularly irregular  Skin: Uniformly warm and dry  Psych: Alert and oriented x4; calm and cooperative throughout exam  Abdomen: Normal bowel sounds. Soft, symmetric, no guarding or rigidity, nontender with palpation.  No organomegaly or masses palpated.   Extremities: BLE strength testing revealed 3/4 to BLEs, 2 blisters intact to right leg, +2 pitting edema BLE     Peripheral Vascular: +1 dorsalis pedis, +1 posterior tibial pulses to BLE , using a handheld doppler these were monophasic in nature.  Good capillary refill. No unusual venous distention. Positive for hemosiderin deposition or hyperpigmentation  Negative for spider veins, telangiectasias and fibrosis or scarring      Circumferential volume measures:        No flowsheet data found.    Ulceration(s)/Wound(s):   Wound #1 Location: L leg scattered  Size: 9L x 13W x 0.2depth.  No sinus tract present, Wound base: slough covered  No undermining present. Wound is full thickness. There is moderate drainage. Periwound: no denudement, + mild erythema, induration, maceration or warmth.       VASC Wound left leg (scattered) (Active)   Pre  Size Length 9 07/12/22 1300   Pre Size Width 13 07/12/22 1300   Pre Size Depth 0.2 07/12/22 1300   Pre Total Sq cm 117 07/12/22 1300       Laboratory studies:     I personally reviewed the following lab results today and those on care everywhere, if indicated     No results found for: CRP   Sed Rate   Date Value Ref Range Status   01/07/2009 10 0 - 20 mm/h Final      Last Renal Panel:  Sodium   Date Value Ref Range Status   07/16/2021 144 133 - 144 mmol/L Final   07/01/2020 139 133 - 144 mmol/L Final     Potassium   Date Value Ref Range Status   07/16/2021 4.3 3.4 - 5.3 mmol/L Final   07/01/2020 4.1 3.4 - 5.3 mmol/L Final     Chloride   Date Value Ref Range Status   07/16/2021 110 (H) 94 - 109 mmol/L Final   07/01/2020 107 94 - 109 mmol/L Final     Carbon Dioxide   Date Value Ref Range Status   07/01/2020 31 20 - 32 mmol/L Final     Carbon Dioxide (CO2)   Date Value Ref Range Status   07/16/2021 28 20 - 32 mmol/L Final     Anion Gap   Date Value Ref Range Status   07/16/2021 6 3 - 14 mmol/L Final   07/01/2020 1 (L) 3 - 14 mmol/L Final     Glucose   Date Value Ref Range Status   07/16/2021 129 (H) 70 - 99 mg/dL Final   07/01/2020 73 70 - 99 mg/dL Final     Comment:     Non Fasting     Urea Nitrogen   Date Value Ref Range Status   07/16/2021 20 7 - 30 mg/dL Final   07/01/2020 13 7 - 30 mg/dL Final     Creatinine   Date Value Ref Range Status   07/16/2021 1.14 0.66 - 1.25 mg/dL Final   07/01/2020 1.15 0.66 - 1.25 mg/dL Final     GFR Estimate   Date Value Ref Range Status   07/16/2021 60 (L) >60 mL/min/1.73m2 Final     Comment:     As of July 11, 2021, eGFR is calculated by the CKD-EPI creatinine equation, without race adjustment. eGFR can be influenced by muscle mass, exercise, and diet. The reported eGFR is an estimation only and is only applicable if the renal function is stable.   07/01/2020 60 (L) >60 mL/min/[1.73_m2] Final     Comment:     Non  GFR Calc  Starting 12/18/2018, serum creatinine based  estimated GFR (eGFR) will be   calculated using the Chronic Kidney Disease Epidemiology Collaboration   (CKD-EPI) equation.       Calcium   Date Value Ref Range Status   07/16/2021 8.8 8.5 - 10.1 mg/dL Final   07/01/2020 8.4 (L) 8.5 - 10.1 mg/dL Final     Albumin   Date Value Ref Range Status   07/01/2020 3.4 3.4 - 5.0 g/dL Final      Lab Results   Component Value Date    WBC 8.5 07/16/2021    WBC 9.5 07/01/2020     Lab Results   Component Value Date    RBC 5.26 07/16/2021    RBC 5.09 07/01/2020     Lab Results   Component Value Date    HGB 16.3 07/16/2021    HGB 16.2 07/01/2020     Lab Results   Component Value Date    HCT 51.2 07/16/2021    HCT 50.9 07/01/2020     No components found for: MCT  Lab Results   Component Value Date    MCV 97 07/16/2021     07/01/2020     Lab Results   Component Value Date    MCH 31.0 07/16/2021    MCH 31.8 07/01/2020     Lab Results   Component Value Date    MCHC 31.8 07/16/2021    MCHC 31.8 07/01/2020     Lab Results   Component Value Date    RDW 14.9 07/16/2021    RDW 15.0 07/01/2020     Lab Results   Component Value Date     07/16/2021     07/01/2020      Lab Results   Component Value Date    A1C 5.5 07/11/2019    A1C 5.7 01/29/2018    A1C 6.0 01/07/2009      TSH   Date Value Ref Range Status   07/16/2021 2.13 0.40 - 4.00 mU/L Final   01/27/2018 1.334 0.340 - 5.600 uIU/ml Final      No results found for: VITDT       Impression:    Encounter Diagnoses   Name Primary?     Open wound of left lower extremity, initial encounter Yes     PAD (peripheral artery disease) (H)      Localized swelling of both lower legs        Assessment/Plan:  1. Debridement: na  2. Treatment: wound treatment will include irrigation and dressings to promote autolytic debridement which will include: Cleanse with dilute hibiclens 30cc in 500cc NS. Apply medihoney to wound and cover with gauze, gauze roll to be changed three times a week. Blister to right leg opened up during visit. Will  cover with gauze, gauze roll. R leg warmth, erythema, erythema traveling above knee. L leg with mild erythema. No fever, chills, or pain. Wound cultured both legs. Started on Augmentin 875-125 mg PO BID x 10 days. ADDENDUM:  Wound culture positive for pseudomonas aeruginosa, klebsiella oxytoca, and enterococcus faecalis. Sensitivity found to Penicillin and Ciprofloxacin. Pt already started on Augmentin, will add Ciprofloxacin 500mg PO BID x 10 days.      If for some reason the patient is not able to get your dressing(s) changed as outlined above (due to illness, lack of supplies, lack of help) please do the following: remove old, soiled dressings; wash the wounds with saline; pat dry; apply ABD pad or other absorbant pad and secure with rolled gauze; avoid tape directly on your skin; Patient instructed to call the clinic as soon as possible to let us know what the current issues are in receiving wound care.    3. Edema. +2 pitting edema to BLE. ABIs not adequate for compression. Toe pressures 0mmhg in right foot and 15mmhg in left foot. No compression at this time. Will ask Vascular to see pt with me during next visit for any recommendations.     4. Offloading: keep pressure off the area    5. Nutrition: Low na diet    6. Leg swelling: Pt with history of heart failure, next PCP appointment 8/3/22. Pt reported improvement in swelling with Lasix that was provided in ER. Started pt on Lasix 20mg daily until able to see PCP next month.    Patient to return to clinic in 1 week(s) for re-evaluation. They were instructed to call the clinic sooner with any further questions or concerns. Answered all questions.      60 minutes spent on the date of the encounter doing chart review, history and exam, documentation and further activities per the note    PALOMO Chand CNP   [facility]  [phone]      This note was electronically signed by PALOMO Chand CNP

## 2022-07-16 LAB
CULTURE RESULTS: SIGNIFICANT CHANGE UP
SPECIMEN SOURCE: SIGNIFICANT CHANGE UP

## 2022-07-20 NOTE — PATIENT PROFILE ADULT - NSTOBACCOUNKNOWNSMK_GEN_A_CORE_RD
Anesthesia Start and Stop Event Times     Date Time Event    7/19/2022 1425 Ready for Procedure     1523 Anesthesia Start     1817 Anesthesia Stop        Responsible Staff  07/19/22    Name Role Begin End    Matthieu Curtis M.D. Anesth 1523 1817        Overtime Reason:  no overtime (within assigned shift)    Comments:                                                       Cognitively Impaired

## 2022-07-30 LAB
CULTURE RESULTS: SIGNIFICANT CHANGE UP
SPECIMEN SOURCE: SIGNIFICANT CHANGE UP

## 2022-08-18 ENCOUNTER — INPATIENT (INPATIENT)
Facility: HOSPITAL | Age: 77
LOS: 7 days | Discharge: NURSING, REHAB OR LONG-TERM CARE | DRG: 870 | End: 2022-08-26
Attending: HOSPITALIST | Admitting: HOSPITALIST
Payer: MEDICARE

## 2022-08-18 VITALS
SYSTOLIC BLOOD PRESSURE: 161 MMHG | HEIGHT: 65 IN | OXYGEN SATURATION: 100 % | WEIGHT: 139.99 LBS | HEART RATE: 114 BPM | RESPIRATION RATE: 24 BRPM | DIASTOLIC BLOOD PRESSURE: 72 MMHG

## 2022-08-18 DIAGNOSIS — Z93.0 TRACHEOSTOMY STATUS: Chronic | ICD-10-CM

## 2022-08-18 DIAGNOSIS — J96.01 ACUTE RESPIRATORY FAILURE WITH HYPOXIA: ICD-10-CM

## 2022-08-18 DIAGNOSIS — Z93.1 GASTROSTOMY STATUS: Chronic | ICD-10-CM

## 2022-08-18 LAB
ALBUMIN SERPL ELPH-MCNC: 2.3 G/DL — LOW (ref 3.3–5)
ALP SERPL-CCNC: 145 U/L — HIGH (ref 30–120)
ALT FLD-CCNC: 16 U/L DA — SIGNIFICANT CHANGE UP (ref 10–60)
ANION GAP SERPL CALC-SCNC: 10 MMOL/L — SIGNIFICANT CHANGE UP (ref 5–17)
APPEARANCE UR: CLEAR — SIGNIFICANT CHANGE UP
APTT BLD: 45.9 SEC — HIGH (ref 27.5–35.5)
AST SERPL-CCNC: 21 U/L — SIGNIFICANT CHANGE UP (ref 10–40)
BASE EXCESS BLDA CALC-SCNC: 1.8 MMOL/L — SIGNIFICANT CHANGE UP (ref -2–3)
BASE EXCESS BLDV CALC-SCNC: -1 MMOL/L — SIGNIFICANT CHANGE UP (ref -2–3)
BASOPHILS # BLD AUTO: 0 K/UL — SIGNIFICANT CHANGE UP (ref 0–0.2)
BASOPHILS NFR BLD AUTO: 0 % — SIGNIFICANT CHANGE UP (ref 0–2)
BILIRUB SERPL-MCNC: 0.4 MG/DL — SIGNIFICANT CHANGE UP (ref 0.2–1.2)
BILIRUB UR-MCNC: NEGATIVE — SIGNIFICANT CHANGE UP
BUN SERPL-MCNC: 50 MG/DL — HIGH (ref 7–23)
CALCIUM SERPL-MCNC: 9.6 MG/DL — SIGNIFICANT CHANGE UP (ref 8.4–10.5)
CHLORIDE SERPL-SCNC: 101 MMOL/L — SIGNIFICANT CHANGE UP (ref 96–108)
CO2 SERPL-SCNC: 24 MMOL/L — SIGNIFICANT CHANGE UP (ref 22–31)
COLOR SPEC: YELLOW — SIGNIFICANT CHANGE UP
CREAT SERPL-MCNC: 1.26 MG/DL — SIGNIFICANT CHANGE UP (ref 0.5–1.3)
DIFF PNL FLD: ABNORMAL
EGFR: 44 ML/MIN/1.73M2 — LOW
EOSINOPHIL # BLD AUTO: 0.25 K/UL — SIGNIFICANT CHANGE UP (ref 0–0.5)
EOSINOPHIL NFR BLD AUTO: 1 % — SIGNIFICANT CHANGE UP (ref 0–6)
GLUCOSE SERPL-MCNC: 278 MG/DL — HIGH (ref 70–99)
GLUCOSE UR QL: NEGATIVE MG/DL — SIGNIFICANT CHANGE UP
HCO3 BLDA-SCNC: 25 MMOL/L — SIGNIFICANT CHANGE UP (ref 21–28)
HCO3 BLDV-SCNC: 24 MMOL/L — SIGNIFICANT CHANGE UP (ref 22–29)
HCT VFR BLD CALC: 32.9 % — LOW (ref 34.5–45)
HGB BLD-MCNC: 9.8 G/DL — LOW (ref 11.5–15.5)
HOROWITZ INDEX BLDA+IHG-RTO: 100 — SIGNIFICANT CHANGE UP
HOROWITZ INDEX BLDV+IHG-RTO: 21 — SIGNIFICANT CHANGE UP
INR BLD: 1.12 RATIO — SIGNIFICANT CHANGE UP (ref 0.88–1.16)
KETONES UR-MCNC: ABNORMAL
LACTATE SERPL-SCNC: 3.7 MMOL/L — HIGH (ref 0.7–2)
LEUKOCYTE ESTERASE UR-ACNC: ABNORMAL
LYMPHOCYTES # BLD AUTO: 13 % — SIGNIFICANT CHANGE UP (ref 13–44)
LYMPHOCYTES # BLD AUTO: 3.26 K/UL — SIGNIFICANT CHANGE UP (ref 1–3.3)
MCHC RBC-ENTMCNC: 26.3 PG — LOW (ref 27–34)
MCHC RBC-ENTMCNC: 29.8 GM/DL — LOW (ref 32–36)
MCV RBC AUTO: 88.2 FL — SIGNIFICANT CHANGE UP (ref 80–100)
MONOCYTES # BLD AUTO: 1.25 K/UL — HIGH (ref 0–0.9)
MONOCYTES NFR BLD AUTO: 5 % — SIGNIFICANT CHANGE UP (ref 2–14)
NEUTROPHILS # BLD AUTO: 20.05 K/UL — HIGH (ref 1.8–7.4)
NEUTROPHILS NFR BLD AUTO: 79 % — HIGH (ref 43–77)
NITRITE UR-MCNC: NEGATIVE — SIGNIFICANT CHANGE UP
NRBC # BLD: SIGNIFICANT CHANGE UP /100 WBCS (ref 0–0)
PCO2 BLDA: 34 MMHG — SIGNIFICANT CHANGE UP (ref 32–35)
PCO2 BLDV: 42 MMHG — SIGNIFICANT CHANGE UP (ref 39–42)
PH BLDA: 7.48 — HIGH (ref 7.35–7.45)
PH BLDV: 7.37 — SIGNIFICANT CHANGE UP (ref 7.32–7.43)
PH UR: 5 — SIGNIFICANT CHANGE UP (ref 5–8)
PLATELET # BLD AUTO: 466 K/UL — HIGH (ref 150–400)
PO2 BLDA: 168 MMHG — HIGH (ref 83–108)
PO2 BLDV: 72 MMHG — HIGH (ref 25–45)
POTASSIUM SERPL-MCNC: 5.7 MMOL/L — HIGH (ref 3.5–5.3)
POTASSIUM SERPL-SCNC: 5.7 MMOL/L — HIGH (ref 3.5–5.3)
PROT SERPL-MCNC: 8.9 G/DL — HIGH (ref 6–8.3)
PROT UR-MCNC: 100 MG/DL
PROTHROM AB SERPL-ACNC: 13.2 SEC — SIGNIFICANT CHANGE UP (ref 10.5–13.4)
RAPID RVP RESULT: SIGNIFICANT CHANGE UP
RBC # BLD: 3.73 M/UL — LOW (ref 3.8–5.2)
RBC # FLD: 15.4 % — HIGH (ref 10.3–14.5)
SAO2 % BLDA: 98.8 % — HIGH (ref 94–98)
SAO2 % BLDV: 94.5 % — HIGH (ref 67–88)
SARS-COV-2 RNA SPEC QL NAA+PROBE: SIGNIFICANT CHANGE UP
SODIUM SERPL-SCNC: 135 MMOL/L — SIGNIFICANT CHANGE UP (ref 135–145)
SP GR SPEC: 1.01 — SIGNIFICANT CHANGE UP (ref 1.01–1.02)
UROBILINOGEN FLD QL: NEGATIVE MG/DL — SIGNIFICANT CHANGE UP
WBC # BLD: 25.06 K/UL — HIGH (ref 3.8–10.5)
WBC # FLD AUTO: 25.06 K/UL — HIGH (ref 3.8–10.5)

## 2022-08-18 PROCEDURE — 71045 X-RAY EXAM CHEST 1 VIEW: CPT | Mod: 26

## 2022-08-18 PROCEDURE — 99291 CRITICAL CARE FIRST HOUR: CPT | Mod: CS

## 2022-08-18 PROCEDURE — 71250 CT THORAX DX C-: CPT | Mod: 26

## 2022-08-18 PROCEDURE — 99223 1ST HOSP IP/OBS HIGH 75: CPT | Mod: AI

## 2022-08-18 RX ORDER — ACETAMINOPHEN 500 MG
650 TABLET ORAL EVERY 6 HOURS
Refills: 0 | Status: DISCONTINUED | OUTPATIENT
Start: 2022-08-18 | End: 2022-08-26

## 2022-08-18 RX ORDER — DEXTROSE 50 % IN WATER 50 %
25 SYRINGE (ML) INTRAVENOUS ONCE
Refills: 0 | Status: DISCONTINUED | OUTPATIENT
Start: 2022-08-18 | End: 2022-08-26

## 2022-08-18 RX ORDER — DEXTROSE 50 % IN WATER 50 %
15 SYRINGE (ML) INTRAVENOUS ONCE
Refills: 0 | Status: DISCONTINUED | OUTPATIENT
Start: 2022-08-18 | End: 2022-08-26

## 2022-08-18 RX ORDER — DEXTROSE 50 % IN WATER 50 %
12.5 SYRINGE (ML) INTRAVENOUS ONCE
Refills: 0 | Status: DISCONTINUED | OUTPATIENT
Start: 2022-08-18 | End: 2022-08-26

## 2022-08-18 RX ORDER — IPRATROPIUM/ALBUTEROL SULFATE 18-103MCG
3 AEROSOL WITH ADAPTER (GRAM) INHALATION EVERY 6 HOURS
Refills: 0 | Status: DISCONTINUED | OUTPATIENT
Start: 2022-08-18 | End: 2022-08-26

## 2022-08-18 RX ORDER — INSULIN GLARGINE 100 [IU]/ML
8 INJECTION, SOLUTION SUBCUTANEOUS EVERY MORNING
Refills: 0 | Status: DISCONTINUED | OUTPATIENT
Start: 2022-08-19 | End: 2022-08-26

## 2022-08-18 RX ORDER — FUROSEMIDE 40 MG
40 TABLET ORAL ONCE
Refills: 0 | Status: COMPLETED | OUTPATIENT
Start: 2022-08-18 | End: 2022-08-19

## 2022-08-18 RX ORDER — MIDAZOLAM HYDROCHLORIDE 1 MG/ML
2 INJECTION, SOLUTION INTRAMUSCULAR; INTRAVENOUS ONCE
Refills: 0 | Status: DISCONTINUED | OUTPATIENT
Start: 2022-08-18 | End: 2022-08-18

## 2022-08-18 RX ORDER — DIAZEPAM 5 MG
5 TABLET ORAL ONCE
Refills: 0 | Status: DISCONTINUED | OUTPATIENT
Start: 2022-08-18 | End: 2022-08-18

## 2022-08-18 RX ORDER — LANOLIN ALCOHOL/MO/W.PET/CERES
3 CREAM (GRAM) TOPICAL AT BEDTIME
Refills: 0 | Status: DISCONTINUED | OUTPATIENT
Start: 2022-08-18 | End: 2022-08-26

## 2022-08-18 RX ORDER — ERTAPENEM SODIUM 1 G/1
1000 INJECTION, POWDER, LYOPHILIZED, FOR SOLUTION INTRAMUSCULAR; INTRAVENOUS EVERY 24 HOURS
Refills: 0 | Status: DISCONTINUED | OUTPATIENT
Start: 2022-08-18 | End: 2022-08-19

## 2022-08-18 RX ORDER — METHOCARBAMOL 500 MG/1
500 TABLET, FILM COATED ORAL
Refills: 0 | Status: DISCONTINUED | OUTPATIENT
Start: 2022-08-18 | End: 2022-08-26

## 2022-08-18 RX ORDER — GLUCAGON INJECTION, SOLUTION 0.5 MG/.1ML
1 INJECTION, SOLUTION SUBCUTANEOUS ONCE
Refills: 0 | Status: DISCONTINUED | OUTPATIENT
Start: 2022-08-18 | End: 2022-08-26

## 2022-08-18 RX ORDER — SENNA PLUS 8.6 MG/1
3 TABLET ORAL AT BEDTIME
Refills: 0 | Status: DISCONTINUED | OUTPATIENT
Start: 2022-08-18 | End: 2022-08-26

## 2022-08-18 RX ORDER — PIPERACILLIN AND TAZOBACTAM 4; .5 G/20ML; G/20ML
3.38 INJECTION, POWDER, LYOPHILIZED, FOR SOLUTION INTRAVENOUS ONCE
Refills: 0 | Status: COMPLETED | OUTPATIENT
Start: 2022-08-18 | End: 2022-08-18

## 2022-08-18 RX ORDER — ONDANSETRON 8 MG/1
4 TABLET, FILM COATED ORAL EVERY 8 HOURS
Refills: 0 | Status: DISCONTINUED | OUTPATIENT
Start: 2022-08-18 | End: 2022-08-26

## 2022-08-18 RX ORDER — IPRATROPIUM/ALBUTEROL SULFATE 18-103MCG
3 AEROSOL WITH ADAPTER (GRAM) INHALATION EVERY 6 HOURS
Refills: 0 | Status: DISCONTINUED | OUTPATIENT
Start: 2022-08-18 | End: 2022-08-18

## 2022-08-18 RX ORDER — SIMETHICONE 80 MG/1
80 TABLET, CHEWABLE ORAL EVERY 6 HOURS
Refills: 0 | Status: DISCONTINUED | OUTPATIENT
Start: 2022-08-18 | End: 2022-08-26

## 2022-08-18 RX ORDER — CHLORHEXIDINE GLUCONATE 213 G/1000ML
15 SOLUTION TOPICAL EVERY 12 HOURS
Refills: 0 | Status: DISCONTINUED | OUTPATIENT
Start: 2022-08-18 | End: 2022-08-19

## 2022-08-18 RX ORDER — HEPARIN SODIUM 5000 [USP'U]/ML
5000 INJECTION INTRAVENOUS; SUBCUTANEOUS EVERY 12 HOURS
Refills: 0 | Status: DISCONTINUED | OUTPATIENT
Start: 2022-08-18 | End: 2022-08-26

## 2022-08-18 RX ORDER — PANTOPRAZOLE SODIUM 20 MG/1
40 TABLET, DELAYED RELEASE ORAL DAILY
Refills: 0 | Status: DISCONTINUED | OUTPATIENT
Start: 2022-08-18 | End: 2022-08-26

## 2022-08-18 RX ORDER — LACTOBACILLUS ACIDOPHILUS 100MM CELL
1 CAPSULE ORAL DAILY
Refills: 0 | Status: DISCONTINUED | OUTPATIENT
Start: 2022-08-18 | End: 2022-08-26

## 2022-08-18 RX ORDER — FENTANYL CITRATE 50 UG/ML
50 INJECTION INTRAVENOUS ONCE
Refills: 0 | Status: DISCONTINUED | OUTPATIENT
Start: 2022-08-18 | End: 2022-08-18

## 2022-08-18 RX ORDER — SODIUM CHLORIDE 9 MG/ML
1000 INJECTION, SOLUTION INTRAVENOUS
Refills: 0 | Status: DISCONTINUED | OUTPATIENT
Start: 2022-08-18 | End: 2022-08-26

## 2022-08-18 RX ORDER — POVIDONE-IODINE 5 %
1 AEROSOL (ML) TOPICAL DAILY
Refills: 0 | Status: DISCONTINUED | OUTPATIENT
Start: 2022-08-18 | End: 2022-08-26

## 2022-08-18 RX ORDER — DEXTROSE 50 % IN WATER 50 %
25 SYRINGE (ML) INTRAVENOUS ONCE
Refills: 0 | Status: COMPLETED | OUTPATIENT
Start: 2022-08-18 | End: 2022-08-18

## 2022-08-18 RX ORDER — INSULIN LISPRO 100/ML
VIAL (ML) SUBCUTANEOUS EVERY 6 HOURS
Refills: 0 | Status: DISCONTINUED | OUTPATIENT
Start: 2022-08-18 | End: 2022-08-26

## 2022-08-18 RX ORDER — ALBUMIN HUMAN 25 %
50 VIAL (ML) INTRAVENOUS ONCE
Refills: 0 | Status: COMPLETED | OUTPATIENT
Start: 2022-08-18 | End: 2022-08-19

## 2022-08-18 RX ORDER — VANCOMYCIN HCL 1 G
1000 VIAL (EA) INTRAVENOUS ONCE
Refills: 0 | Status: COMPLETED | OUTPATIENT
Start: 2022-08-18 | End: 2022-08-18

## 2022-08-18 RX ORDER — NYSTATIN CREAM 100000 [USP'U]/G
1 CREAM TOPICAL THREE TIMES A DAY
Refills: 0 | Status: DISCONTINUED | OUTPATIENT
Start: 2022-08-18 | End: 2022-08-26

## 2022-08-18 RX ORDER — INSULIN HUMAN 100 [IU]/ML
10 INJECTION, SOLUTION SUBCUTANEOUS ONCE
Refills: 0 | Status: COMPLETED | OUTPATIENT
Start: 2022-08-18 | End: 2022-08-18

## 2022-08-18 RX ORDER — POLYETHYLENE GLYCOL 3350 17 G/17G
17 POWDER, FOR SOLUTION ORAL EVERY 12 HOURS
Refills: 0 | Status: DISCONTINUED | OUTPATIENT
Start: 2022-08-18 | End: 2022-08-26

## 2022-08-18 RX ORDER — COLLAGENASE CLOSTRIDIUM HIST. 250 UNIT/G
1 OINTMENT (GRAM) TOPICAL DAILY
Refills: 0 | Status: DISCONTINUED | OUTPATIENT
Start: 2022-08-18 | End: 2022-08-26

## 2022-08-18 RX ORDER — FOLIC ACID 0.8 MG
1 TABLET ORAL DAILY
Refills: 0 | Status: DISCONTINUED | OUTPATIENT
Start: 2022-08-18 | End: 2022-08-26

## 2022-08-18 RX ORDER — SODIUM CHLORIDE 9 MG/ML
1750 INJECTION INTRAMUSCULAR; INTRAVENOUS; SUBCUTANEOUS ONCE
Refills: 0 | Status: COMPLETED | OUTPATIENT
Start: 2022-08-18 | End: 2022-08-18

## 2022-08-18 RX ADMIN — METHOCARBAMOL 500 MILLIGRAM(S): 500 TABLET, FILM COATED ORAL at 21:53

## 2022-08-18 RX ADMIN — Medication 25 MILLILITER(S): at 23:03

## 2022-08-18 RX ADMIN — NYSTATIN CREAM 1 APPLICATION(S): 100000 CREAM TOPICAL at 21:52

## 2022-08-18 RX ADMIN — ERTAPENEM SODIUM 120 MILLIGRAM(S): 1 INJECTION, POWDER, LYOPHILIZED, FOR SOLUTION INTRAMUSCULAR; INTRAVENOUS at 21:52

## 2022-08-18 RX ADMIN — MIDAZOLAM HYDROCHLORIDE 2 MILLIGRAM(S): 1 INJECTION, SOLUTION INTRAMUSCULAR; INTRAVENOUS at 21:43

## 2022-08-18 RX ADMIN — Medication 250 MILLIGRAM(S): at 19:01

## 2022-08-18 RX ADMIN — INSULIN HUMAN 10 UNIT(S): 100 INJECTION, SOLUTION SUBCUTANEOUS at 23:10

## 2022-08-18 RX ADMIN — PIPERACILLIN AND TAZOBACTAM 200 GRAM(S): 4; .5 INJECTION, POWDER, LYOPHILIZED, FOR SOLUTION INTRAVENOUS at 19:01

## 2022-08-18 RX ADMIN — Medication 2 MILLIGRAM(S): at 21:37

## 2022-08-18 RX ADMIN — Medication 5 MILLIGRAM(S): at 19:02

## 2022-08-18 RX ADMIN — SODIUM CHLORIDE 1750 MILLILITER(S): 9 INJECTION INTRAMUSCULAR; INTRAVENOUS; SUBCUTANEOUS at 19:02

## 2022-08-18 NOTE — ED PROVIDER NOTE - ENMT, MLM
Airway patent, Nasal mucosa clear. Mouth with normal mucosa. Throat has no vesicles, no oropharyngeal exudates and uvula is midline. neck supple. trach in place, no edema,

## 2022-08-18 NOTE — ED ADULT NURSE NOTE - OBJECTIVE STATEMENT
77 y/o female received via stretcher bibems for respiratory distress eval. pt is chronically vented, bag valved by EMS, RT at bedside, changed to ventilator, placed on cardiac monitor, pt noted very restless, labored breathing, heavily diaphoretic. IVL inserted successfully after several attempts due to contractions and very poor venous access. medicated as ordered. versed IVP given, pt was able to calm down and fall asleep, HR improved from 138bpm to 103bpm. O2 sat also improved from 69% to 100% after sedation. pt's trach suctioned with white secretions noted.
28-Oct-2017

## 2022-08-18 NOTE — ED PROVIDER NOTE - CLINICAL SUMMARY MEDICAL DECISION MAKING FREE TEXT BOX
Acute resp distress, chroincally trach vented - inc oxygen requirement . check labs, xr, ABG, inc O2, TBA

## 2022-08-18 NOTE — ED PROVIDER NOTE - PROGRESS NOTE DETAILS
Pt with some improvement, O2 sat ~ 92 on current vent settings Pt doing wel on vent - no acute changes, will monitor. Dw Dr GERALDO Christina, will see pt to admit, SPCU

## 2022-08-18 NOTE — ED PROVIDER NOTE - OBJECTIVE STATEMENT
76-year-old female with history of diabetes, GERD, COPD, history of chronic respiratory failure, history of chronically trached vented, acute pulmonary edema, osteoporosis, prior COVID infection, asthma, history of sepsis infections, anemia, dementia presents with increased dyspnea and hypoxia noted at facility today.  Patient with no acute pain.  Patient has had similar issues with similar hypoxia in the past.  No known fevers or chills.  No known COVID exposures.  No other acute history available.

## 2022-08-18 NOTE — H&P ADULT - NSHPPHYSICALEXAM_GEN_ALL_CORE
PHYSICAL EXAM:  Vital Signs Last 24 Hrs  T(C): --  T(F): --  HR: 98 (18 Aug 2022 20:18) (98 - 138)  BP: 161/72 (18 Aug 2022 18:06) (161/72 - 161/72)  BP(mean): --  RR: 40 (18 Aug 2022 19:14) (24 - 40)  SpO2: 100% (18 Aug 2022 20:18) (91% - 100%)    Parameters below as of 18 Aug 2022 19:14  Patient On (Oxygen Delivery Method): tracheostomy collar  O2 Flow (L/min): 15      GENERAL:     NAD, well-groomed, well-developed  HEAD:     atraumatic, normocephalic  EYES:     EOMI, conjunctiva and sclera clear  ENMT:     no tonsillar erythema or exudates or enlargement, no oral lesions, moist mucous membranes, good dentition  NECK:     supple, no JVD  RESPIRATORY:     clear to auscultation bilaterally, no rales or rhonchi or wheezing or rubs  CARDIOVASCULAR:     regular rate and rhythm, no murmurs or rubs or gallops, 2+ peripheral pulses  GASTROINTESTINAL:     soft, nontender, nondistended, no hepatosplenomegaly palpated, bowel sounds present  EXTREMITIES:     no clubbing or cyanosis or edema  MUSCULOSKELETAL:     no joint pain or swelling or deformities  NERVOUS SYSTEM:     motor strength intact with 5/5 B/L upper and lower extremities, no gross sensory deficits  SKIN:     no rashes or lesions  PSYCH:     appropriate, alert and orientated x3, good concentration PHYSICAL EXAM:  Vital Signs Last 24 Hrs  T(C): --  T(F): --  HR: 98 (18 Aug 2022 20:18) (98 - 138)  BP: 161/72 (18 Aug 2022 18:06) (161/72 - 161/72)  BP(mean): --  RR: 40 (18 Aug 2022 19:14) (24 - 40)  SpO2: 100% (18 Aug 2022 20:18) (91% - 100%)    Parameters below as of 18 Aug 2022 19:14  Patient On (Oxygen Delivery Method): tracheostomy collar  O2 Flow (L/min): 15      GENERAL:     cachectic appearing, thin, chronically contracted female, non-verbal, seeminly in NAD  HEAD:     atraumatic  NECK:    RESPIRATORY:     diminished throughout with crackles  CARDIOVASCULAR:     regular rate and rhythm, no murmurs or rubs or gallops  GASTROINTESTINAL:     soft, nontender, nondistended, +PEG in place, bowel sounds present  EXTREMITIES:     no clubbing or cyanosis or edema  MUSCULOSKELETAL:     no joint pain or swelling or deformities  NERVOUS SYSTEM:     motor strength intact with 5/5 B/L upper and lower extremities, no gross sensory deficits  SKIN:     no rashes or lesions  PSYCH:     appropriate, alert and orientated x3, good concentration PHYSICAL EXAM:  Vital Signs Last 24 Hrs  T(C): --  T(F): --  HR: 98 (18 Aug 2022 20:18) (98 - 138)  BP: 161/72 (18 Aug 2022 18:06) (161/72 - 161/72)  BP(mean): --  RR: 40 (18 Aug 2022 19:14) (24 - 40)  SpO2: 100% (18 Aug 2022 20:18) (91% - 100%)    Parameters below as of 18 Aug 2022 19:14  Patient On (Oxygen Delivery Method): tracheostomy collar  O2 Flow (L/min): 15      GENERAL:     cachectic appearing, thin, chronically contracted female, non-verbal, seemingly in NAD  HEAD:     atraumatic  NECK:   trach->vent in place  RESPIRATORY:     diminished throughout with crackles  CARDIOVASCULAR:     regular rate and rhythm, no murmurs or rubs or gallops  GASTROINTESTINAL:     soft, nontender, nondistended, +PEG in place, hypoactive bowel sounds  EXTREMITIES:     no clubbing or cyanosis or edema  MUSCULOSKELETAL:     contractures throughout  NEURO:  quadripelgic with rigidity  SKIN:  unstageable decub ulcer of left buttocks, necrotic ulcer of bilateral great toe   PSYCH:   nonverbal, AOx0

## 2022-08-18 NOTE — ED PROVIDER NOTE - IV ALTEPLASE ADMIN OUTSIDE HIDDEN
show Emotional support was provided to pt. and family. This CCLS engaged pt. in medical play for familiarization of materials for day of procedure. Psychological preparation for procedure was provided through pictures and medical materials. Parental support and preparation was provided.

## 2022-08-18 NOTE — H&P ADULT - NSHPLABSRESULTS_GEN_ALL_CORE
LABS:                        9.8<L>  25.06<H> )-----------( 466<H>    ( 18 Aug 2022 18:45 )             32.9<L>    135    |  101    |  50<H>  ----------------------------<  278<H>    18 Aug 2022 18:45  5.7<H>   |  24     |  1.26         Ca 9.6           18 Aug 2022 18:45        TPro  8.9<H>  /  Alb  2.3<L>  /  TBili  0.4    /  DBili  x      /  AST  21     /  ALT  16     /  AlkPhos  145<H>  18 Aug 2022 18:45    PT/INR - ( 18 Aug 2022 18:45 )   PT: 13.2 ;   INR: 1.12          PTT - ( 18 Aug 2022 18:45 )  PTT:45.9<H>    Urinalysis Basic - ( 18 Aug 2022 20:00 )    Color: Yellow / Appearance: Clear / S.015 / pH: x  Gluc: x / Ketone: Trace  / Bili: Negative / Urobili: Negative mg/dL   Blood: x / Protein: 100 mg/dL / Nitrite: Negative   Leuk Esterase: Trace / RBC: 3-5 /HPF / WBC 3-5   Sq Epi: x / Non Sq Epi: Moderate / Bacteria: Few        Troponin trend:      Lactate, Blood: 3.7 mmol/L (22 @ 18:45)      EKG:  Radiology:  CXR - prelim read by me - consolidation of RUL  CT chest results pending

## 2022-08-18 NOTE — PROGRESS NOTE ADULT - SUBJECTIVE AND OBJECTIVE BOX
Patient is a 76y old  Female who presents with a chief complaint of respiratory distress (18 Aug 2022 20:18)      BRIEF HOSPITAL COURSE: Patient is a 75 yo female with a pmh of dementia, COPD with chronic resp failure;  vent dependent, s/p PEG, cardiac arrest, CHF, cor pulmonale, CVA, COVID-19, CKD, anemia, multiple admissions for respiratory distress (last admission from - diagnosed with PNA with parapneumonic pleural effusion s/p thoracentesis and Avycaz) who presented to  ED on 22 from [facility] with respiratory distress. In Ed patient was noted to have dyspnea and hypoxia. Work up revealed WBC 25.06, platelet 466, K 5.7, lactate 3.7, ABG 7.48/34/168. Patient was given empiric abx with vanco and zosyn, along with 1750 cc of IVF. Of note, patient admitted with outpatient midline. Patient admitted to SPCU.  Upon arrival to SPCU, patient breath stacking with ventilator desaturating requiring escalation of oxygen and further sedation.    Events last 24 hours: Admitted to SPCU with resp distress, sepsis, pna and ? CHF exacerbation.     PAST MEDICAL & SURGICAL HISTORY:  Dementia of frontal lobe type  Aphasic stroke  Diabetes mellitus  Respiratory failure  Hypertension  GERD (gastroesophageal reflux disease)  Constipation  Respiratory failure  CVA (cerebral vascular accident)  HTN (hypertension)  DM (diabetes mellitus)  Advanced dementia  COVID-19 virus detected  Quadriplegia  Pneumonia  Type II diabetes mellitus  Hx of appendectomy  Gastrostomy in place  Tracheostomy in place  Tracheostomy tube present  Feeding by G-tube      Review of Systems:  Unable to obtain d/t clinical condition       Medications:  ertapenem  IVPB 1000 milliGRAM(s) IV Intermittent every 24 hours  albuterol/ipratropium for Nebulization 3 milliLiter(s) Nebulizer every 6 hours PRN  acetaminophen     Tablet .. 650 milliGRAM(s) Oral every 6 hours PRN  fentaNYL    Injectable 50 MICROGram(s) IV Push once  LORazepam     Tablet 2 milliGRAM(s) Oral every 4 hours  LORazepam   Injectable 2 milliGRAM(s) IV Push every 4 hours PRN  melatonin 3 milliGRAM(s) Oral at bedtime PRN  methocarbamol 500 milliGRAM(s) Oral two times a day  ondansetron Injectable 4 milliGRAM(s) IV Push every 8 hours PRN  heparin   Injectable 5000 Unit(s) SubCutaneous every 12 hours  aluminum hydroxide/magnesium hydroxide/simethicone Suspension 30 milliLiter(s) Oral every 4 hours PRN  pantoprazole  Injectable 40 milliGRAM(s) IV Push daily  polyethylene glycol 3350 17 Gram(s) Oral every 12 hours  senna 3 Tablet(s) Oral at bedtime  simethicone 80 milliGRAM(s) Chew every 6 hours  dextrose 50% Injectable 25 Gram(s) IV Push once  dextrose 50% Injectable 12.5 Gram(s) IV Push once  dextrose 50% Injectable 25 Gram(s) IV Push once  dextrose Oral Gel 15 Gram(s) Oral once PRN  glucagon  Injectable 1 milliGRAM(s) IntraMuscular once  insulin lispro (ADMELOG) corrective regimen sliding scale   SubCutaneous every 6 hours  dextrose 5%. 1000 milliLiter(s) IV Continuous <Continuous>  dextrose 5%. 1000 milliLiter(s) IV Continuous <Continuous>  folic acid 1 milliGRAM(s) Oral daily  multivitamin 1 Tablet(s) Oral daily  chlorhexidine 0.12% Liquid 15 milliLiter(s) Oral Mucosa every 12 hours  chlorhexidine 0.12% Liquid 15 milliLiter(s) Oral Mucosa every 12 hours  collagenase Ointment 1 Application(s) Topical daily  nystatin Powder 1 Application(s) Topical three times a day  povidone iodine 10% Solution 1 Application(s) Topical daily  lactobacillus acidophilus 1 Tablet(s) Oral daily    Mode: AC/ CMV (Assist Control/ Continuous Mandatory Ventilation)  RR (machine): 16  TV (machine): 400  FiO2: 100  PEEP: 5  ITime: 1  MAP: 11  PIP: 28    ICU Vital Signs Last 24 Hrs  T(C): 37.6 (18 Aug 2022 20:18), Max: 37.6 (18 Aug 2022 20:18)  T(F): 99.7 (18 Aug 2022 20:18), Max: 99.7 (18 Aug 2022 20:18)  HR: 98 (18 Aug 2022 20:18) (98 - 138)  BP: 104/58 (18 Aug 2022 20:18) (104/58 - 161/72)  BP(mean): --  ABP: --  ABP(mean): --  RR: 35 (18 Aug 2022 20:18) (24 - 40)  SpO2: 100% (18 Aug 2022 20:18) (91% - 100%)    O2 Parameters below as of 18 Aug 2022 19:14  Patient On (Oxygen Delivery Method): tracheostomy collar  O2 Flow (L/min): 15    ABG - ( 18 Aug 2022 20:06 )  pH, Arterial: 7.48  pH, Blood: x     /  pCO2: 34    /  pO2: 168   / HCO3: 25    / Base Excess: 1.8   /  SaO2: 98.8      I&O's Detail    LABS:                        9.8    25.06 )-----------( 466      ( 18 Aug 2022 18:45 )             32.9         135  |  101  |  50<H>  ----------------------------<  278<H>  5.7<H>   |  24  |  1.26    Ca    9.6      18 Aug 2022 18:45    TPro  8.9<H>  /  Alb  2.3<L>  /  TBili  0.4  /  DBili  x   /  AST  21  /  ALT  16  /  AlkPhos  145<H>      CAPILLARY BLOOD GLUCOSE    POCT Blood Glucose.: 286 mg/dL (18 Aug 2022 22:02)    PT/INR - ( 18 Aug 2022 18:45 )   PT: 13.2 sec;   INR: 1.12 ratio         PTT - ( 18 Aug 2022 18:45 )  PTT:45.9 sec  Urinalysis Basic - ( 18 Aug 2022 20:00 )    Color: Yellow / Appearance: Clear / S.015 / pH: x  Gluc: x / Ketone: Trace  / Bili: Negative / Urobili: Negative mg/dL   Blood: x / Protein: 100 mg/dL / Nitrite: Negative   Leuk Esterase: Trace / RBC: 3-5 /HPF / WBC 3-5   Sq Epi: x / Non Sq Epi: Moderate / Bacteria: Few    CULTURES:  Rapid RVP Result: NotDetec (22 @ 19:14)      Physical Examination:    General: Eyes open, though unresponsive, ill appearing    HEENT: Pupils equal, reactive to light.  Symmetric.    PULM: Expiratory wheezes, diminished breath sounds b/l, no significant sputum production    CVS: Regular rate and rhythm, no murmurs, rubs, or gallops    ABD: Soft, nondistended, normoactive bowel sounds, PEG    EXT: No edema    SKIN: Pressure injury to coccyx, b/l first toes with ? DTI.    NEURO: Unresponsive, does not follow commands, rigidity, quadriplegic       CRITICAL CARE TIME SPENT: 52 minutes  Evaluating/treating patient, reviewing data/labs/imaging, discussing case with multidisciplinary team, discussing plan/goals of care with patient/family. Non-inclusive of procedure time.

## 2022-08-18 NOTE — ED PROVIDER NOTE - CARE PLAN
1 Principal Discharge DX:	Acute respiratory failure with hypoxia  Secondary Diagnosis:	Leukocytosis, unspecified type   Principal Discharge DX:	Acute respiratory failure with hypoxia  Secondary Diagnosis:	Leukocytosis, unspecified type  Secondary Diagnosis:	Pneumonia involving right lung

## 2022-08-18 NOTE — PROGRESS NOTE ADULT - ASSESSMENT
Patient is a 77 yo female with a pmh of dementia, COPD with chronic resp failure;  vent dependent, s/p PEG, cardiac arrest, CHF, cor pulmonale, CVA, COVID-19, CKD, anemia, who presented to  ED with resp distress, now admitted to SPCU.    Problem:  - Acute on chronic hypoxic resp failure  - Severe sepsis  - Systolic HF exacerbation / pulm edema  - Hyperkalemia  - Anemia  - CKD  - Acidosis     Plan:  Neuro: On ativan PO outpatient for comfort / vent compliance, will c/w in hospital. Will add PRN versed as needed for vent compliance d/t noted breath stacking and high peak pressures.  Respiratory: Patient currently on Full vent support, vent setting 16/400/5/60 (previously on 100%). Titrate settings to maintain SaO2 >90%, and pH >7.25, consider low tidal volume ventilation strategy w/ goal Tv 4-8 cc/kg of ideal body weight, plateau pressure goal <30. Peridex oral care. Noted to have pleural effusions -? if cardiac component. Will add duonebs for wheezing and hx of COPD.   Cardiac: RSR with HTN, likely related to pain / discomfort as resolved with sedation. Will give x1 40 iv lasix given concern of pulm edema / volume overload - may assist with diffusion of pleural effusions. Pending TTE  and cardiology work up in am.  GI: NPO at this time. PEG in place.   /Renal: Noted to have hx of CKD, avoid nephrotoxic agents. Hyperkalemic at 5.7, will give lasix per above, insulin and d50. Will place Woody catheter for accurate I&O while diuresis. Trend cmp, replace electrolytes as needed.  ID: Patient noted to have midline on admission, will remove and culture. Urine and blood cultures obtained, will add sputum. Prior hospitalization patient requiring ertapenem, c/w as per hospitalist ordered, will need ID consult in am. Trend CBC, repeat lactate. Assess for fevers.  Hem/Onc: Anemia, though no s/s of bleeding, continue to monitor at this time. VTE ppx with sub q heparin.  Endo: Goal glucose 100-180. q6 blood glucose monitoring.   Dispo: Full code, SPCU.    Patient is a 77 yo female with a pmh of dementia, COPD with chronic resp failure;  vent dependent, s/p PEG, cardiac arrest, CHF, cor pulmonale, CVA, COVID-19, CKD, anemia, who presented to  ED with resp distress, now admitted to SPCU.    Problem:  - Acute on chronic hypoxic resp failure  - Severe sepsis  - Systolic HF exacerbation / pulm edema  - Hyperkalemia  - Anemia  - CKD  - Acidosis     Plan:  Neuro: On ativan PO outpatient for comfort / vent compliance, will c/w in hospital. Will add PRN versed as needed for vent compliance d/t noted breath stacking and high peak pressures.  Respiratory: Patient currently on Full vent support, vent setting 16/400/5/60 (previously on 100%). Titrate settings to maintain SaO2 >90%, and pH >7.25, consider low tidal volume ventilation strategy w/ goal Tv 4-8 cc/kg of ideal body weight, plateau pressure goal <30. Peridex oral care. Noted to have pleural effusions -? if cardiac component. Will add duonebs for wheezing and hx of COPD.   Cardiac: RSR with HTN, likely related to pain / discomfort as resolved with sedation. Will give x1 40 iv lasix given concern of pulm edema / volume overload - may assist with diffusion of pleural effusions, consider albumin followed by diuresis. Pending TTE  and cardiology work up in am.  GI: NPO at this time. PEG in place.   /Renal: Noted to have hx of CKD, avoid nephrotoxic agents. Hyperkalemic at 5.7, will give lasix per above, insulin and d50. Will place Woody catheter for accurate I&O while diuresis. Trend cmp, replace electrolytes as needed.  ID: Patient noted to have midline on admission, will remove and culture. Urine and blood cultures obtained, will add sputum. Prior hospitalization patient requiring ertapenem, c/w as per hospitalist ordered, will need ID consult in am. Trend CBC, repeat lactate. Assess for fevers.  Hem/Onc: Anemia, though no s/s of bleeding, continue to monitor at this time. VTE ppx with sub q heparin.  Endo: Goal glucose 100-180. q6 blood glucose monitoring.   Dispo: Full code, SPCU.     Case discussed with EICU attending Dr. Buck

## 2022-08-18 NOTE — PROCEDURE NOTE - ADDITIONAL PROCEDURE DETAILS
Patient is a 75 yo female admitted with  - Acute on chronic hypoxic resp failure  - Severe sepsis  - Systolic HF exacerbation / pulm edema  - Hyperkalemia  - Anemia  - CKD  - Acidosis     CVC placed due to loss of IV access and and poor vasculature for further peripherals with contracted ext.  CVC placed under ultrasound, vessel visualized, access obtained, wire passed easily and visualized in R IJ, catheter passed and wire removed. All ports aspirated and flushed.

## 2022-08-18 NOTE — PATIENT PROFILE ADULT - FALL HARM RISK - HARM RISK INTERVENTIONS
Assistance with ambulation/Assistance OOB with selected safe patient handling equipment/Communicate Risk of Fall with Harm to all staff/Discuss with provider need for PT consult/Monitor gait and stability/Reinforce activity limits and safety measures with patient and family/Tailored Fall Risk Interventions/Visual Cue: Yellow wristband and red socks/Bed in lowest position, wheels locked, appropriate side rails in place/Call bell, personal items and telephone in reach/Instruct patient to call for assistance before getting out of bed or chair/Non-slip footwear when patient is out of bed/[place] to call system/Physically safe environment - no spills, clutter or unnecessary equipment/Purposeful Proactive Rounding/Room/bathroom lighting operational, light cord in reach

## 2022-08-18 NOTE — H&P ADULT - HISTORY OF PRESENT ILLNESS
***Patient with dementia and is non-verbal and is chronically trach/vented.  Patient is from facility and therefore information is obtained from chart.***    76F with dementia, COPD with chronic resp failure and is vent dependent, s/p PEG, hx of cardiac arrest, CHF, cor pulmonale, CVA, COVID-19 infxn, CKD, anemia, multiple admissions for respiratory distress (last admission from 6/1-6/9 diagnosed with PNA with parapneumonic pleural effusion s/p thoracentesis and Avycaz) who presents from [facility] for respiratory distress again.  [facility] transfer note only mention respiratory distress.  As per ED note, patient was noted to have increased dyspnea and worsening hypoxia.  No noted pain or reported fevers/chills.  In the ED, patient's triage vitals were /72  , RR 24, 100% on vent, T 99.7F.  Labs showed leukocytosis 25.06 (up from 6.85 on 6/10), anemia 9.8 (from 11.3), thrombocytosis 466 (from 264), hyperkalemia 5.7, lactic acid 3.7.  ABG was 7.48/34/168.  CT chest pending.  CXR showed possible consolidation on right lobe.  Patient started empirically on vancomycin and zosyn by ED.  Patient is being admitted for respiratory distress 2/2 PNA.  Currently patient is non-verbal and does not seem to be in any distress.

## 2022-08-18 NOTE — CONSULT NOTE ADULT - ASSESSMENT
Initial evaluation/Pulmonary Critical Care consultation requested on   by Dr     from Dr Sandoval   Patient examined chart reviewed    HOSPITAL ADMISSION   PATIENT CAME  FROM (if information available)      REVIEW OF SYMPTOMS      Able to give (reliable) ROS  NO     PHYSICAL EXAM    HEENT Unremarkable  atraumatic   RESP Fair air entry EXP prolonged    Harsh breath sound Resp distres mild   CARDIAC S1 S2 No S3     NO JVD    ABDOMEN SOFT BS PRESENT NOT DISTENDED No hepatosplenomegaly   PEDAL EDEMA present No calf tenderness  NO rash       PATIENT PRESENTATION.  76-year-old female with history of diabetes, GERD, COPD, history of chronic respiratory failure, history of chronically trached vented, acute pulmonary edema, osteoporosis, prior COVID infection, asthma, history of sepsis infections, anemia, dementia presents with increased dyspnea and hypoxia noted at facility today.  Patient with no acute pain.  Patient has had similar issues with similar hypoxia in the past.  No known fevers or chills.  No known COVID exposures.  No other acute history available.  Pulm critical care consulted 8/18/2022                                            AGE/SEX.   76 f  DOA.  8/18/2022  CC .  8/18/2022 respiratory failure, chronic trach vent, full code, recent Pneumonia/pleural effusion  shortness of breath    HOSPITAL COURSE.   WOUND CARE 8/18/2022   PNEUMONIA 8/18/2022   LACTICEMIA 8/18/2022  VENT DEPENDENT  RESP FAILURE 8/18/2022    ANEMIA 8/18/2022   HYPERKALEMIA 8/18/2022   RYAN 8/18/2022   DM     PMH .  pmh Pneumonia    pmh 5/2/2022 SERRATIA CARBAPENEM RESISTANT PSEUDOMONAS   pmh HTN,   pmh DM,   pmh CVA,   pmh  chronic respiratory failure   pmh s/p trach, PEG,   pmh cardiac arrest  pmh Pl effsn  r PL EFFSN   6/8/2022 r thorac g 161 l 222 p 4.9 p 10 l 16 .38    l pl effsn  5/25/2022 g 183 l 144/381 .37 p 3.4/5.3 .64 p 23 l 36   5/25/2022l pl fluid  lymph pred exudate   cyto (-)         GENERAL DATA .   GOC.  8/18/2022 full code      ALLGY.   codeine                          WT.     8/18/2022 63                               BMI.     8/18/2022 23                         ICU STAY. 8/18/2022  COVID. 8/18/2022 scv2 (-)       BEST PRACTICE ISSUES.    HOB ELEVATN. Yes  DVT PPLX.  8/18/2022 hpsc     SQUIRES PPLX.  8/18/2022 protonix 40     INFN PPLX. 8/18/2022 chlorhexidine .12%     SP SW EM.        DIET. 8/18/2022 gflucerna 1200 gt                 GENERAL DATA .   GOC.  8/18/2022 full code      ALLGY.   codeine                          WT.     8/18/2022 63                               BMI.     8/18/2022 23                         ICU STAY. 8/18/2022  COVID. 8/18/2022 scv2 (-)       BEST PRACTICE ISSUES.    HOB ELEVATN. Yes  DVT PPLX.  8/18/2022 hpsc     SQUIRES PPLX.  8/18/2022 protonix 40     INFN PPLX. 8/18/2022 chlorhexidine .12%     SP SW EM.        DIET. 8/18/2022 gflucerna 1200 gt                   VS/ PO/IO/ VENT/ DRIPS.   8/18/2022 99f 98 100/50         ASSESSMENT/RECOMMENDATIONS .   RESP.  VENT MANAGEMENT.  HOB elevation  Target Pplat 30 (-)  Target PO 90-95%  Target pH 730 (+)  Daily spontaneous breathing trials   Daily sedation vacation   GAS EXCHANGE.  vbg 8/18/2022 vbg pH 737   COPD.   8/18/2022 duoneb   INFECTION.  WOUND CARE  8/18/2022 povidone   PNEUMONIA .  w 8/18/2022 w 25   ua 8/18/2022 w 3-5   rvp 8/18/2022 (-0   8/18/2022 ertapenem   CARDIAC.  LACTICEMIA.  la 8/18/2022 la 3.7   GI.  HEMAT.  ANEMIA.   Hb 8/18/2022 Hb 9.8   RENAL.  HYPERKALEMIA.  K 8/18/2022 k 5.7   RYAN.  Cr 8/18/2022 Cr 1.2   IV fl.  ENDOCRINE.   DM.  8/18/2022 Insulin     TIME SPENT   Over 55 minutes aggregate critical  care time spent on encounter; activities included   direct patient care, counseling and/or coordinating care reviewing notes, lab data/ imaging , discussion with multidisciplinary team/ patient  /family and explaining in detail risks, benefits, alternatives  of the recommendations     CHAPINCITO GUIDRY 76 f [facility] S 8/18/2022   DR JOSEPH DU

## 2022-08-18 NOTE — H&P ADULT - ASSESSMENT
76F with dementia, COPD with chronic resp failure and is vent dependent, s/p PEG, hx of cardiac arrest, CHF, cor pulmonale, CVA, COVID-19 infxn, CKD, anemia, multiple admissions for respiratory distress (last admission from 6/1-6/9 diagnosed with PNA with parapneumonic pleural effusion s/p thoracentesis and Avycaz) who presents from [facility] for respiratory distress again.   Likely with repeat PNA especially given new CXR findings.   Meets severe sepsis criteria with tachypnea + leukocytosis + lactic acid + source of infection.       Severe sepsis 2/2 acute hypoxic respiratory failure 2/2 PNA   - admitted to SPCU   - continue current vent settings (on AC with RR 16, Tv 500, PEEP 5, FiO2 100%) maintain O2 sat >92%  - repeat ABG  - will change abx to ertapenem (was on it during last admission)  - repeat lactate  - judicious use of fluids given hx of CHF (received 1750cc of NS in ED)  - f/u blood cultures  - has hx of respiratory distress driven by vent asynchrony and would require IV benzos  - cc/pulm consult  - ID consult    Hx of CHF/possible pulmonary edema  - likely is a component of pulmonary edema given hx of CHF -> will consider lasix 40mg IV x1   - last TTE was 5/30 with EF 65% with normal LVSF, dilated RV, and moderate pHTN -> repeat TTE  - cardiology consult  - may need repeat thoracentesis (had 1.5L drained two admissions ago)      Anemia of chronic disease  - has hx of transfusion (received 1 units of pRBC for hgb 7.6 on 5/30)  - has been evaluated by GI and hematology in the past -> dx with ACD with intermittent GI bleeding  - can recheck occult blood     Hyperkalemia  - will be given both insulin and lasix -> monitor and repeat    Hx of CKD stage 3 - baseline is around 1.2, currently at 1.26  - renally dose and monitor BMP and electrolytes     HTN  - not on any BP meds at facility  - may need HTN agents if continues to be hypertensive    DM2  - NPO with TF  - continue with insulin 8units qAM as per last admission  - start moderate insulin coverage scale with FS q6h while NPO  - maintain FS <180    Diet  - continue with same TF from last admission    Preventive measures  - cont with heparin subq for DVT ppx  - Full Code

## 2022-08-18 NOTE — ED ADULT NURSE NOTE - INTERVENTIONS DEFINITIONS
Stretcher in lowest position, wheels locked, appropriate side rails in place/Monitor for mental status changes and reorient to person, place, and time

## 2022-08-19 LAB
A1C WITH ESTIMATED AVERAGE GLUCOSE RESULT: 7.3 % — HIGH (ref 4–5.6)
ALBUMIN SERPL ELPH-MCNC: 2 G/DL — LOW (ref 3.3–5)
ALP SERPL-CCNC: 99 U/L — SIGNIFICANT CHANGE UP (ref 30–120)
ALT FLD-CCNC: 11 U/L DA — SIGNIFICANT CHANGE UP (ref 10–60)
ANION GAP SERPL CALC-SCNC: 7 MMOL/L — SIGNIFICANT CHANGE UP (ref 5–17)
ANION GAP SERPL CALC-SCNC: 9 MMOL/L — SIGNIFICANT CHANGE UP (ref 5–17)
AST SERPL-CCNC: 11 U/L — SIGNIFICANT CHANGE UP (ref 10–40)
BASOPHILS # BLD AUTO: 0.02 K/UL — SIGNIFICANT CHANGE UP (ref 0–0.2)
BASOPHILS NFR BLD AUTO: 0.3 % — SIGNIFICANT CHANGE UP (ref 0–2)
BILIRUB SERPL-MCNC: 0.3 MG/DL — SIGNIFICANT CHANGE UP (ref 0.2–1.2)
BLD GP AB SCN SERPL QL: SIGNIFICANT CHANGE UP
BUN SERPL-MCNC: 46 MG/DL — HIGH (ref 7–23)
BUN SERPL-MCNC: 48 MG/DL — HIGH (ref 7–23)
CALCIUM SERPL-MCNC: 8.3 MG/DL — LOW (ref 8.4–10.5)
CALCIUM SERPL-MCNC: 8.5 MG/DL — SIGNIFICANT CHANGE UP (ref 8.4–10.5)
CHLORIDE SERPL-SCNC: 102 MMOL/L — SIGNIFICANT CHANGE UP (ref 96–108)
CHLORIDE SERPL-SCNC: 104 MMOL/L — SIGNIFICANT CHANGE UP (ref 96–108)
CO2 SERPL-SCNC: 27 MMOL/L — SIGNIFICANT CHANGE UP (ref 22–31)
CO2 SERPL-SCNC: 28 MMOL/L — SIGNIFICANT CHANGE UP (ref 22–31)
CREAT SERPL-MCNC: 1.1 MG/DL — SIGNIFICANT CHANGE UP (ref 0.5–1.3)
CREAT SERPL-MCNC: 1.27 MG/DL — SIGNIFICANT CHANGE UP (ref 0.5–1.3)
EGFR: 43 ML/MIN/1.73M2 — LOW
EGFR: 51 ML/MIN/1.73M2 — LOW
EOSINOPHIL # BLD AUTO: 0.2 K/UL — SIGNIFICANT CHANGE UP (ref 0–0.5)
EOSINOPHIL NFR BLD AUTO: 2.9 % — SIGNIFICANT CHANGE UP (ref 0–6)
ESTIMATED AVERAGE GLUCOSE: 163 MG/DL — HIGH (ref 68–114)
GLUCOSE SERPL-MCNC: 143 MG/DL — HIGH (ref 70–99)
GLUCOSE SERPL-MCNC: 146 MG/DL — HIGH (ref 70–99)
HCT VFR BLD CALC: 23.2 % — LOW (ref 34.5–45)
HCT VFR BLD CALC: 24.6 % — LOW (ref 34.5–45)
HGB BLD-MCNC: 7 G/DL — CRITICAL LOW (ref 11.5–15.5)
HGB BLD-MCNC: 7.5 G/DL — LOW (ref 11.5–15.5)
IMM GRANULOCYTES NFR BLD AUTO: 0.9 % — SIGNIFICANT CHANGE UP (ref 0–1.5)
IRON SATN MFR SERPL: 11 % — LOW (ref 14–50)
IRON SATN MFR SERPL: 16 UG/DL — LOW (ref 30–160)
LACTATE SERPL-SCNC: 1.2 MMOL/L — SIGNIFICANT CHANGE UP (ref 0.7–2)
LYMPHOCYTES # BLD AUTO: 1.2 K/UL — SIGNIFICANT CHANGE UP (ref 1–3.3)
LYMPHOCYTES # BLD AUTO: 17.7 % — SIGNIFICANT CHANGE UP (ref 13–44)
MAGNESIUM SERPL-MCNC: 2.7 MG/DL — HIGH (ref 1.6–2.6)
MCHC RBC-ENTMCNC: 26.3 PG — LOW (ref 27–34)
MCHC RBC-ENTMCNC: 26.5 PG — LOW (ref 27–34)
MCHC RBC-ENTMCNC: 30.2 GM/DL — LOW (ref 32–36)
MCHC RBC-ENTMCNC: 30.5 GM/DL — LOW (ref 32–36)
MCV RBC AUTO: 86.3 FL — SIGNIFICANT CHANGE UP (ref 80–100)
MCV RBC AUTO: 87.9 FL — SIGNIFICANT CHANGE UP (ref 80–100)
MONOCYTES # BLD AUTO: 0.73 K/UL — SIGNIFICANT CHANGE UP (ref 0–0.9)
MONOCYTES NFR BLD AUTO: 10.8 % — SIGNIFICANT CHANGE UP (ref 2–14)
MRSA PCR RESULT.: SIGNIFICANT CHANGE UP
NEUTROPHILS # BLD AUTO: 4.57 K/UL — SIGNIFICANT CHANGE UP (ref 1.8–7.4)
NEUTROPHILS NFR BLD AUTO: 67.4 % — SIGNIFICANT CHANGE UP (ref 43–77)
NRBC # BLD: 0 /100 WBCS — SIGNIFICANT CHANGE UP (ref 0–0)
NRBC # BLD: 0 /100 WBCS — SIGNIFICANT CHANGE UP (ref 0–0)
NT-PROBNP SERPL-SCNC: HIGH PG/ML (ref 0–450)
OB PNL STL: NEGATIVE — SIGNIFICANT CHANGE UP
PHOSPHATE SERPL-MCNC: 3.1 MG/DL — SIGNIFICANT CHANGE UP (ref 2.5–4.5)
PLATELET # BLD AUTO: 272 K/UL — SIGNIFICANT CHANGE UP (ref 150–400)
PLATELET # BLD AUTO: 297 K/UL — SIGNIFICANT CHANGE UP (ref 150–400)
POTASSIUM SERPL-MCNC: 4.4 MMOL/L — SIGNIFICANT CHANGE UP (ref 3.5–5.3)
POTASSIUM SERPL-MCNC: 4.5 MMOL/L — SIGNIFICANT CHANGE UP (ref 3.5–5.3)
POTASSIUM SERPL-SCNC: 4.4 MMOL/L — SIGNIFICANT CHANGE UP (ref 3.5–5.3)
POTASSIUM SERPL-SCNC: 4.5 MMOL/L — SIGNIFICANT CHANGE UP (ref 3.5–5.3)
PROT SERPL-MCNC: 7 G/DL — SIGNIFICANT CHANGE UP (ref 6–8.3)
RBC # BLD: 2.64 M/UL — LOW (ref 3.8–5.2)
RBC # BLD: 2.85 M/UL — LOW (ref 3.8–5.2)
RBC # FLD: 15 % — HIGH (ref 10.3–14.5)
RBC # FLD: 15.1 % — HIGH (ref 10.3–14.5)
S AUREUS DNA NOSE QL NAA+PROBE: SIGNIFICANT CHANGE UP
SODIUM SERPL-SCNC: 138 MMOL/L — SIGNIFICANT CHANGE UP (ref 135–145)
SODIUM SERPL-SCNC: 139 MMOL/L — SIGNIFICANT CHANGE UP (ref 135–145)
TIBC SERPL-MCNC: 147 UG/DL — LOW (ref 220–430)
UIBC SERPL-MCNC: 131 UG/DL — SIGNIFICANT CHANGE UP (ref 110–370)
WBC # BLD: 11.21 K/UL — HIGH (ref 3.8–10.5)
WBC # BLD: 6.78 K/UL — SIGNIFICANT CHANGE UP (ref 3.8–10.5)
WBC # FLD AUTO: 11.21 K/UL — HIGH (ref 3.8–10.5)
WBC # FLD AUTO: 6.78 K/UL — SIGNIFICANT CHANGE UP (ref 3.8–10.5)

## 2022-08-19 PROCEDURE — 99291 CRITICAL CARE FIRST HOUR: CPT

## 2022-08-19 PROCEDURE — 93306 TTE W/DOPPLER COMPLETE: CPT | Mod: 26

## 2022-08-19 PROCEDURE — 71045 X-RAY EXAM CHEST 1 VIEW: CPT | Mod: 26

## 2022-08-19 RX ORDER — FENTANYL CITRATE 50 UG/ML
50 INJECTION INTRAVENOUS ONCE
Refills: 0 | Status: DISCONTINUED | OUTPATIENT
Start: 2022-08-19 | End: 2022-08-19

## 2022-08-19 RX ORDER — CHLORHEXIDINE GLUCONATE 213 G/1000ML
1 SOLUTION TOPICAL
Refills: 0 | Status: DISCONTINUED | OUTPATIENT
Start: 2022-08-19 | End: 2022-08-19

## 2022-08-19 RX ORDER — SODIUM CHLORIDE 9 MG/ML
10 INJECTION INTRAMUSCULAR; INTRAVENOUS; SUBCUTANEOUS
Refills: 0 | Status: DISCONTINUED | OUTPATIENT
Start: 2022-08-19 | End: 2022-08-26

## 2022-08-19 RX ORDER — PIPERACILLIN AND TAZOBACTAM 4; .5 G/20ML; G/20ML
3.38 INJECTION, POWDER, LYOPHILIZED, FOR SOLUTION INTRAVENOUS ONCE
Refills: 0 | Status: COMPLETED | OUTPATIENT
Start: 2022-08-19 | End: 2022-08-19

## 2022-08-19 RX ORDER — FUROSEMIDE 40 MG
40 TABLET ORAL ONCE
Refills: 0 | Status: DISCONTINUED | OUTPATIENT
Start: 2022-08-19 | End: 2022-08-19

## 2022-08-19 RX ORDER — PIPERACILLIN AND TAZOBACTAM 4; .5 G/20ML; G/20ML
3.38 INJECTION, POWDER, LYOPHILIZED, FOR SOLUTION INTRAVENOUS EVERY 8 HOURS
Refills: 0 | Status: COMPLETED | OUTPATIENT
Start: 2022-08-19 | End: 2022-08-26

## 2022-08-19 RX ORDER — FUROSEMIDE 40 MG
20 TABLET ORAL ONCE
Refills: 0 | Status: COMPLETED | OUTPATIENT
Start: 2022-08-19 | End: 2022-08-19

## 2022-08-19 RX ORDER — CHLORHEXIDINE GLUCONATE 213 G/1000ML
15 SOLUTION TOPICAL EVERY 12 HOURS
Refills: 0 | Status: DISCONTINUED | OUTPATIENT
Start: 2022-08-19 | End: 2022-08-26

## 2022-08-19 RX ORDER — SODIUM CHLORIDE 9 MG/ML
4 INJECTION INTRAMUSCULAR; INTRAVENOUS; SUBCUTANEOUS EVERY 12 HOURS
Refills: 0 | Status: DISCONTINUED | OUTPATIENT
Start: 2022-08-19 | End: 2022-08-26

## 2022-08-19 RX ADMIN — Medication 3 MILLILITER(S): at 12:57

## 2022-08-19 RX ADMIN — Medication 2 MILLIGRAM(S): at 21:22

## 2022-08-19 RX ADMIN — CHLORHEXIDINE GLUCONATE 1 APPLICATION(S): 213 SOLUTION TOPICAL at 05:01

## 2022-08-19 RX ADMIN — Medication 1 APPLICATION(S): at 12:03

## 2022-08-19 RX ADMIN — FENTANYL CITRATE 50 MICROGRAM(S): 50 INJECTION INTRAVENOUS at 06:04

## 2022-08-19 RX ADMIN — PANTOPRAZOLE SODIUM 40 MILLIGRAM(S): 20 TABLET, DELAYED RELEASE ORAL at 11:45

## 2022-08-19 RX ADMIN — Medication 3 MILLILITER(S): at 19:54

## 2022-08-19 RX ADMIN — Medication 50 MILLILITER(S): at 00:16

## 2022-08-19 RX ADMIN — Medication 2: at 05:11

## 2022-08-19 RX ADMIN — Medication 2 MILLIGRAM(S): at 17:44

## 2022-08-19 RX ADMIN — Medication 2 MILLIGRAM(S): at 05:10

## 2022-08-19 RX ADMIN — SODIUM CHLORIDE 4 MILLILITER(S): 9 INJECTION INTRAMUSCULAR; INTRAVENOUS; SUBCUTANEOUS at 20:56

## 2022-08-19 RX ADMIN — Medication 2: at 23:29

## 2022-08-19 RX ADMIN — Medication 1 APPLICATION(S): at 18:14

## 2022-08-19 RX ADMIN — Medication 3 MILLILITER(S): at 01:41

## 2022-08-19 RX ADMIN — Medication 3 MILLILITER(S): at 06:54

## 2022-08-19 RX ADMIN — Medication 2 MILLIGRAM(S): at 01:14

## 2022-08-19 RX ADMIN — METHOCARBAMOL 500 MILLIGRAM(S): 500 TABLET, FILM COATED ORAL at 05:12

## 2022-08-19 RX ADMIN — PIPERACILLIN AND TAZOBACTAM 200 GRAM(S): 4; .5 INJECTION, POWDER, LYOPHILIZED, FOR SOLUTION INTRAVENOUS at 18:46

## 2022-08-19 RX ADMIN — Medication 2 MILLIGRAM(S): at 13:02

## 2022-08-19 RX ADMIN — Medication 1 MILLIGRAM(S): at 11:44

## 2022-08-19 RX ADMIN — HEPARIN SODIUM 5000 UNIT(S): 5000 INJECTION INTRAVENOUS; SUBCUTANEOUS at 05:13

## 2022-08-19 RX ADMIN — NYSTATIN CREAM 1 APPLICATION(S): 100000 CREAM TOPICAL at 05:12

## 2022-08-19 RX ADMIN — SIMETHICONE 80 MILLIGRAM(S): 80 TABLET, CHEWABLE ORAL at 05:10

## 2022-08-19 RX ADMIN — SENNA PLUS 3 TABLET(S): 8.6 TABLET ORAL at 21:22

## 2022-08-19 RX ADMIN — Medication 2 MILLIGRAM(S): at 10:53

## 2022-08-19 RX ADMIN — HEPARIN SODIUM 5000 UNIT(S): 5000 INJECTION INTRAVENOUS; SUBCUTANEOUS at 17:44

## 2022-08-19 RX ADMIN — NYSTATIN CREAM 1 APPLICATION(S): 100000 CREAM TOPICAL at 13:11

## 2022-08-19 RX ADMIN — INSULIN GLARGINE 8 UNIT(S): 100 INJECTION, SOLUTION SUBCUTANEOUS at 08:49

## 2022-08-19 RX ADMIN — SIMETHICONE 80 MILLIGRAM(S): 80 TABLET, CHEWABLE ORAL at 23:30

## 2022-08-19 RX ADMIN — Medication 4: at 17:45

## 2022-08-19 RX ADMIN — CHLORHEXIDINE GLUCONATE 15 MILLILITER(S): 213 SOLUTION TOPICAL at 05:12

## 2022-08-19 RX ADMIN — NYSTATIN CREAM 1 APPLICATION(S): 100000 CREAM TOPICAL at 21:22

## 2022-08-19 RX ADMIN — Medication 1 TABLET(S): at 11:45

## 2022-08-19 RX ADMIN — METHOCARBAMOL 500 MILLIGRAM(S): 500 TABLET, FILM COATED ORAL at 17:44

## 2022-08-19 RX ADMIN — SIMETHICONE 80 MILLIGRAM(S): 80 TABLET, CHEWABLE ORAL at 11:44

## 2022-08-19 RX ADMIN — Medication 4: at 00:24

## 2022-08-19 RX ADMIN — Medication 2 TABLET(S): at 18:46

## 2022-08-19 RX ADMIN — SIMETHICONE 80 MILLIGRAM(S): 80 TABLET, CHEWABLE ORAL at 00:18

## 2022-08-19 RX ADMIN — SIMETHICONE 80 MILLIGRAM(S): 80 TABLET, CHEWABLE ORAL at 17:44

## 2022-08-19 RX ADMIN — Medication 40 MILLIGRAM(S): at 01:09

## 2022-08-19 RX ADMIN — FENTANYL CITRATE 50 MICROGRAM(S): 50 INJECTION INTRAVENOUS at 06:19

## 2022-08-19 RX ADMIN — CHLORHEXIDINE GLUCONATE 15 MILLILITER(S): 213 SOLUTION TOPICAL at 05:13

## 2022-08-19 RX ADMIN — Medication 20 MILLIGRAM(S): at 23:30

## 2022-08-19 RX ADMIN — CHLORHEXIDINE GLUCONATE 15 MILLILITER(S): 213 SOLUTION TOPICAL at 17:45

## 2022-08-19 RX ADMIN — POLYETHYLENE GLYCOL 3350 17 GRAM(S): 17 POWDER, FOR SOLUTION ORAL at 05:11

## 2022-08-19 RX ADMIN — POLYETHYLENE GLYCOL 3350 17 GRAM(S): 17 POWDER, FOR SOLUTION ORAL at 18:14

## 2022-08-19 RX ADMIN — Medication 2: at 11:48

## 2022-08-19 NOTE — PROGRESS NOTE ADULT - ASSESSMENT
76F with dementia, COPD with chronic resp failure and is vent dependent, s/p PEG, hx of cardiac arrest, CHF, cor pulmonale, CVA, COVID-19 infxn, CKD, anemia, multiple admissions for respiratory distress (last admission from 6/1-6/9 diagnosed with PNA with parapneumonic pleural effusion s/p thoracentesis and Avycaz) who presents from [facility] for respiratory distress again.   Likely with repeat PNA especially given new CXR findings.   Meets severe sepsis criteria with tachypnea + leukocytosis + lactic acid + source of infection.       Severe sepsis 2/2 acute hypoxic respiratory failure 2/2 PNA   - admitted to [facility]   - continue current vent settings (on AC with RR 16, Tv 500, PEEP 5, FiO2 100%) maintain O2 sat >92%  - repeat ABG  - will change abx to ertapenem (was on it during last admission)  - repeat lactate  - judicious use of fluids given hx of CHF (received 1750cc of NS in ED)  - f/u blood cultures  - has hx of respiratory distress driven by vent asynchrony and would require IV benzos  - cc/pulm consult  - ID consult    Hx of CHF/possible pulmonary edema  - likely is a component of pulmonary edema given hx of CHF -> will consider lasix 40mg IV x1   - last TTE was 5/30 with EF 65% with normal LVSF, dilated RV, and moderate pHTN -> repeat TTE  - cardiology consult  - may need repeat thoracentesis (had 1.5L drained two admissions ago)      Anemia of chronic disease  - has hx of transfusion (received 1 units of pRBC for hgb 7.6 on 5/30)  - has been evaluated by GI and hematology in the past -> dx with ACD with intermittent GI bleeding  - can recheck occult blood     Hyperkalemia  - will be given both insulin and lasix -> monitor and repeat    Hx of CKD stage 3 - baseline is around 1.2, currently at 1.26  - renally dose and monitor BMP and electrolytes     HTN  - not on any BP meds at facility  - may need HTN agents if continues to be hypertensive    DM2  - NPO with TF  - continue with insulin 8units qAM as per last admission  - start moderate insulin coverage scale with FS q6h while NPO  - maintain FS <180    Diet  - continue with same TF from last admission    Preventive measures  - cont with heparin subq for DVT ppx  - Full Code  76F with dementia, COPD with chronic resp failure and is vent dependent, s/p PEG, hx of cardiac arrest, CHF, cor pulmonale, CVA, COVID-19 infxn, CKD, anemia, multiple admissions for respiratory distress (last admission from 6/1-6/9 diagnosed with PNA with parapneumonic pleural effusion s/p thoracentesis and Avycaz) who presents from [facility] for respiratory distress again.   Likely with repeat PNA especially given new CXR findings.   Meets severe sepsis criteria with tachypnea + leukocytosis + lactic acid + source of infection.       Severe sepsis 2/2 acute hypoxic respiratory failure 2/2 PNA   - continue current vent settings (on AC with RR 16, Tv 500, PEEP 5, FiO2 100%) maintain O2 sat >92%  - on ertapenem, as per ID changed to zosyn+bactrim for coverage of prior cultures  - lactate downtrending  - cautious use of fluids given hx of CHF (received 1750cc of NS in ED)  - f/u blood cultures  - has hx of respiratory distress driven by vent asynchrony and would require IV benzos  - cc/pulm consult  - ID consult    Hx of CHF/possible pulmonary edema  - likely is a component of pulmonary edema given hx of CHF, ordered lasix 40mg IV x1 post-transfusion  - last TTE was 5/30 with EF 65% with normal LVSF, dilated RV, and moderate pHTN -> repeat TTE appears unchanged  - cardiology consult  - may need repeat thoracentesis (had 1.5L drained two admissions ago), pulmonology consulted    Anemia of chronic disease  - has hx of transfusion (received 1 units of pRBC for hgb 7.6 on 5/30)  - has been evaluated by GI and hematology in the past -> dx with ACD with intermittent GI bleeding  - negative occult blood   - ordered 1u pRBC, will monitor repeat labs    Hyperkalemia  -given both insulin and lasix -> will monitor     Hx of CKD stage 3 - baseline is around 1.2, currently at 1.26  - renally dose and monitor BMP and electrolytes     HTN  - not on any BP meds at facility  - may need HTN agents if continues to be hypertensive    DM2  - NPO with TF  - continue with insulin 8units qAM as per last admission  - start moderate insulin coverage scale with FS q6h while NPO  - maintain FS <180    Diet  - continue with same TF from last admission    Preventive measures  - cont with heparin subq for DVT ppx  - Full Code  76F with dementia, COPD with chronic resp failure and is vent dependent, s/p PEG, hx of cardiac arrest, CHF, cor pulmonale, CVA, COVID-19 infxn, CKD, anemia, multiple admissions for respiratory distress (last admission from 6/1-6/9 diagnosed with PNA with parapneumonic pleural effusion s/p thoracentesis and Avycaz) who presents from [facility] for respiratory distress again.   Likely with repeat PNA especially given new CXR findings.   Meets severe sepsis criteria with tachypnea + leukocytosis + lactic acid + source of infection.       Severe sepsis 2/2 acute hypoxic respiratory failure 2/2 PNA   - continue current vent settings (on AC with RR 16, Tv 500, PEEP 5, FiO2 100%) maintain O2 sat >92%  - on ertapenem, as per ID changed to zosyn+bactrim for coverage of prior cultures  - lactate downtrending  - cautious use of fluids given hx of CHF (received 1750cc of NS in ED)  - f/u blood cultures, urine culture  - has hx of respiratory distress driven by vent asynchrony and would require IV benzos  - cc/pulm consult  - ID consult    Hx of CHF/possible pulmonary edema  - likely is a component of pulmonary edema given hx of CHF, ordered lasix 40mg IV x1 post-transfusion  - last TTE was 5/30 with EF 65% with normal LVSF, dilated RV, and moderate pHTN -> repeat TTE appears unchanged  - cardiology consult  - may need repeat thoracentesis (had 1.5L drained two admissions ago), pulmonology consulted    Anemia of chronic disease  - has hx of transfusion (received 1 units of pRBC for hgb 7.6 on 5/30)  - has been evaluated by GI and hematology in the past -> dx with ACD with intermittent GI bleeding  - negative occult blood   - ordered 1u pRBC, will monitor repeat labs    Hyperkalemia  -given both insulin and lasix -> will monitor     Hx of CKD stage 3 - baseline is around 1.2, currently at 1.26  - renally dose and monitor BMP and electrolytes     HTN  - not on any BP meds at facility  - may need HTN agents if continues to be hypertensive    DM2  - NPO with TF  - continue with insulin 8units qAM as per last admission  - start moderate insulin coverage scale with FS q6h while NPO  - maintain FS <180    Diet  - continue with same TF from last admission    Preventive measures  - cont with heparin subq for DVT ppx  - Full Code

## 2022-08-19 NOTE — PROGRESS NOTE ADULT - SUBJECTIVE AND OBJECTIVE BOX
Patient is a 76y old  Female who presents with a chief complaint of respiratory distress (18 Aug 2022 20:18)      BRIEF HOSPITAL COURSE: Patient is a 75 yo female with a pmh of dementia, COPD with chronic resp failure;  vent dependent, s/p PEG, cardiac arrest, CHF, cor pulmonale, CVA, COVID-19, CKD, anemia, multiple admissions for respiratory distress (last admission from - diagnosed with PNA with parapneumonic pleural effusion s/p thoracentesis and Avycaz) who presented to  ED on 22 from [facility] with respiratory distress. In Ed patient was noted to have dyspnea and hypoxia. Work up revealed WBC 25.06, platelet 466, K 5.7, lactate 3.7, ABG 7.48/34/168. Patient was given empiric abx with vanco and zosyn, along with 1750 cc of IVF. Of note, patient admitted with outpatient midline. Patient admitted to [facility].      Events last 24 hours: Patient noted to have breath stacking and dysnchrony on admission requiring benzo IVP. Today patient with decline in hgb with no known source - ? anemia of chronic disease. x1 PRBC and lasix.    PAST MEDICAL & SURGICAL HISTORY:  Dementia of frontal lobe type  Aphasic stroke  Diabetes mellitus  Respiratory failure  Hypertension  GERD (gastroesophageal reflux disease)  Constipation  Respiratory failure  CVA (cerebral vascular accident)  HTN (hypertension)  DM (diabetes mellitus)  Advanced dementia  COVID-19 virus detected  Quadriplegia  Pneumonia  Type II diabetes mellitus  Hx of appendectomy  Gastrostomy in place  Tracheostomy in place  Tracheostomy tube present  Feeding by G-tube      Review of Systems:  Unable to obtain d/t clinical condition       Medications:  ertapenem  IVPB 1000 milliGRAM(s) IV Intermittent every 24 hours  albuterol/ipratropium for Nebulization 3 milliLiter(s) Nebulizer every 6 hours PRN  acetaminophen     Tablet .. 650 milliGRAM(s) Oral every 6 hours PRN  fentaNYL    Injectable 50 MICROGram(s) IV Push once  LORazepam     Tablet 2 milliGRAM(s) Oral every 4 hours  LORazepam   Injectable 2 milliGRAM(s) IV Push every 4 hours PRN  melatonin 3 milliGRAM(s) Oral at bedtime PRN  methocarbamol 500 milliGRAM(s) Oral two times a day  ondansetron Injectable 4 milliGRAM(s) IV Push every 8 hours PRN  heparin   Injectable 5000 Unit(s) SubCutaneous every 12 hours  aluminum hydroxide/magnesium hydroxide/simethicone Suspension 30 milliLiter(s) Oral every 4 hours PRN  pantoprazole  Injectable 40 milliGRAM(s) IV Push daily  polyethylene glycol 3350 17 Gram(s) Oral every 12 hours  senna 3 Tablet(s) Oral at bedtime  simethicone 80 milliGRAM(s) Chew every 6 hours  dextrose 50% Injectable 25 Gram(s) IV Push once  dextrose 50% Injectable 12.5 Gram(s) IV Push once  dextrose 50% Injectable 25 Gram(s) IV Push once  dextrose Oral Gel 15 Gram(s) Oral once PRN  glucagon  Injectable 1 milliGRAM(s) IntraMuscular once  insulin lispro (ADMELOG) corrective regimen sliding scale   SubCutaneous every 6 hours  dextrose 5%. 1000 milliLiter(s) IV Continuous <Continuous>  dextrose 5%. 1000 milliLiter(s) IV Continuous <Continuous>  folic acid 1 milliGRAM(s) Oral daily  multivitamin 1 Tablet(s) Oral daily  chlorhexidine 0.12% Liquid 15 milliLiter(s) Oral Mucosa every 12 hours  chlorhexidine 0.12% Liquid 15 milliLiter(s) Oral Mucosa every 12 hours  collagenase Ointment 1 Application(s) Topical daily  nystatin Powder 1 Application(s) Topical three times a day  povidone iodine 10% Solution 1 Application(s) Topical daily  lactobacillus acidophilus 1 Tablet(s) Oral daily    Mode: AC/ CMV (Assist Control/ Continuous Mandatory Ventilation)  RR (machine): 16  TV (machine): 400  FiO2: 100  PEEP: 5  ITime: 1  MAP: 11  PIP: 28    ICU Vital Signs Last 24 Hrs  T(C): 37.6 (18 Aug 2022 20:18), Max: 37.6 (18 Aug 2022 20:18)  T(F): 99.7 (18 Aug 2022 20:18), Max: 99.7 (18 Aug 2022 20:18)  HR: 98 (18 Aug 2022 20:18) (98 - 138)  BP: 104/58 (18 Aug 2022 20:18) (104/58 - 161/72)  BP(mean): --  ABP: --  ABP(mean): --  RR: 35 (18 Aug 2022 20:18) (24 - 40)  SpO2: 100% (18 Aug 2022 20:18) (91% - 100%)    O2 Parameters below as of 18 Aug 2022 19:14  Patient On (Oxygen Delivery Method): tracheostomy collar  O2 Flow (L/min): 15    ABG - ( 18 Aug 2022 20:06 )  pH, Arterial: 7.48  pH, Blood: x     /  pCO2: 34    /  pO2: 168   / HCO3: 25    / Base Excess: 1.8   /  SaO2: 98.8      I&O's Detail    LABS:                        9.8    25.06 )-----------( 466      ( 18 Aug 2022 18:45 )             32.9         135  |  101  |  50<H>  ----------------------------<  278<H>  5.7<H>   |  24  |  1.26    Ca    9.6      18 Aug 2022 18:45    TPro  8.9<H>  /  Alb  2.3<L>  /  TBili  0.4  /  DBili  x   /  AST  21  /  ALT  16  /  AlkPhos  145<H>      CAPILLARY BLOOD GLUCOSE    POCT Blood Glucose.: 286 mg/dL (18 Aug 2022 22:02)    PT/INR - ( 18 Aug 2022 18:45 )   PT: 13.2 sec;   INR: 1.12 ratio         PTT - ( 18 Aug 2022 18:45 )  PTT:45.9 sec  Urinalysis Basic - ( 18 Aug 2022 20:00 )    Color: Yellow / Appearance: Clear / S.015 / pH: x  Gluc: x / Ketone: Trace  / Bili: Negative / Urobili: Negative mg/dL   Blood: x / Protein: 100 mg/dL / Nitrite: Negative   Leuk Esterase: Trace / RBC: 3-5 /HPF / WBC 3-5   Sq Epi: x / Non Sq Epi: Moderate / Bacteria: Few    CULTURES:  Rapid RVP Result: NotDetec (22 @ 19:14)      Physical Examination:    General: Unresponsive, not following commands, ill appearing.    HEENT: Pupils equal, reactive to light.  Symmetric.    PULM: B/l crackles noted, diminished breath sounds in b/l lower bases. Thick sputum production.     CVS: Regular rate and rhythm, no murmurs, rubs, or gallops    ABD: Soft, nondistended, normoactive bowel sounds, PEG    EXT: No edema    SKIN: Pressure injury to coccyx, b/l first toes with ? DTI.    NEURO: Unresponsive, does not follow commands, rigidity, quadriplegic       CRITICAL CARE TIME SPENT: 38 minutes  Evaluating/treating patient, reviewing data/labs/imaging, discussing case with multidisciplinary team, discussing plan/goals of care with patient/family. Non-inclusive of procedure time.

## 2022-08-19 NOTE — CONSULT NOTE ADULT - ASSESSMENT
76F with dementia, COPD with chronic resp failure and is vent dependent, s/p PEG, hx of cardiac arrest, CHF, cor pulmonale, CVA, COVID-19 infxn, CKD, anemia, multiple admissions for respiratory distress (last admission from 6/1-6/9 diagnosed with PNA with parapneumonic pleural effusion s/p thoracentesis and Avycaz) who presents from [facility] for respiratory distress

## 2022-08-19 NOTE — DIETITIAN INITIAL EVALUATION ADULT - ETIOLOGY
related to inability to consume sufficient protein/energy secondary to increased demands in setting of wound healing needs and illness related to inability to endocrine dysfunction, acute illness related to endocrine dysfunction, acute illness

## 2022-08-19 NOTE — DIETITIAN INITIAL EVALUATION ADULT - NS FNS DIET ORDER
Diet, NPO with Tube Feed:   Tube Feeding Modality: Gastrostomy  Glucerna 1.5 Horacio  Total Volume for 24 Hours (mL): 1200  Continuous  Until Goal Tube Feed Rate (mL per Hour): 60  Tube Feed Duration (in Hours): 20  Tube Feed Start Time: 00:00  Free Water Flush  Pump   Rate (mL per Hour): 25   Frequency: Every Hour    Duration (Hours): 24 (08-18-22 @ 20:02)  Diet, NPO (08-18-22 @ 20:02)

## 2022-08-19 NOTE — PROGRESS NOTE ADULT - SUBJECTIVE AND OBJECTIVE BOX
BREA BECKHAM     [facility]    Allergies    codeine (Hives)    Intolerances        PAST MEDICAL & SURGICAL HISTORY:  Dementia of frontal lobe type      Aphasic stroke      Diabetes mellitus      Respiratory failure      Hypertension      GERD (gastroesophageal reflux disease)      Constipation      Respiratory failure      CVA (cerebral vascular accident)      HTN (hypertension)      DM (diabetes mellitus)      Advanced dementia      COVID-19 virus detected      Quadriplegia      Pneumonia      Type II diabetes mellitus      Hx of appendectomy      Gastrostomy in place      Tracheostomy in place      Tracheostomy tube present      Feeding by G-tube          FAMILY HISTORY:  No pertinent family history in first degree relatives        Home Medications:  albuterol 90 mcg/inh inhalation aerosol with adapter: 2  inhaled every 6 hours (21 May 2022 21:16)  Bacid (LAC) oral tablet: 2 tab(s) by gastrostomy tube once a day (21 May 2022 21:16)  Betadine 10% topical swab: cleanse right and left great toes (21 May 2022 21:16)  Carafate 1 g/10 mL oral suspension: 10 milliliter(s) by gastrostomy tube 4 times a day (before meals and at bedtime) for 14 days (Started 21) (21 May 2022 21:16)  chlorhexidine 0.12% mucous membrane liquid: 15 milliliter(s) mucous membrane 2 times a day (21 May 2022 21:16)  ertapenem 1 g injection: 1 gram(s) injectable once a day until  (10 Zay 2022 12:12)  Eucerin topical cream: Apply topically to affected area once a day bilateral feet (21 May 2022 21:16)  folic acid 1 mg oral tablet: 1 tab(s) orally once a day (21 May 2022 21:16)  furosemide 20 mg oral tablet: 1 tab(s) orally once a day (10 Zay 2022 12:12)  insulin glargine 100 units/mL subcutaneous solution: 8 unit(s) subcutaneous once a day (in the morning) (2022 08:24)  ipratropium-albuterol 0.5 mg-2.5 mg/3 mL inhalation solution: 3 milliliter(s) inhaled 4 times a day (21 May 2022 21:16)  LORazepam 1 mg oral tablet: 1 tab(s) by gastrostomy tube every 4 hours (21 May 2022 21:16)  methocarbamol 500 mg oral tablet: 1 tab(s) by gastrostomy tube 2 times a day (21 May 2022 21:16)  MiraLax oral powder for reconstitution:  (21 May 2022 23:14)  Multiple Vitamins oral tablet: 1 tab(s) orally once a day (21 May 2022 21:16)  nystatin 100,000 units/g topical powder: 1 application topically 3 times a day (29 May 2022 16:35)  omeprazole 20 mg oral delayed release capsule: orally 2 times a day (21 May 2022 23:14)  polyethylene glycol 3350 oral powder for reconstitution: 17 gram(s) by gastrostomy tube every 12 hours (21 May 2022 21:16)  senna 8.6 mg oral tablet: 3 tab(s) by gastrostomy tube once a day (at bedtime) (21 May 2022 21:16)  simethicone 80 mg oral tablet, chewable: 1 tab(s) by gastrostomy tube every 6 hours (21 May 2022 21:16)  Tylenol 325 mg oral tablet: 2 tab(s) by gastrostomy tube once a day; 60 minutes prior to dressing change  (21 May 2022 21:16)  Tylenol 325 mg oral tablet: 2 tab(s) by gastrostomy tube every 6 hours, As Needed (21 May 2022 21:16)      MEDICATIONS  (STANDING):  albuterol/ipratropium for Nebulization 3 milliLiter(s) Nebulizer every 6 hours  chlorhexidine 0.12% Liquid 15 milliLiter(s) Oral Mucosa every 12 hours  chlorhexidine 0.12% Liquid 15 milliLiter(s) Oral Mucosa every 12 hours  chlorhexidine 4% Liquid 1 Application(s) Topical <User Schedule>  collagenase Ointment 1 Application(s) Topical daily  dextrose 5%. 1000 milliLiter(s) (100 mL/Hr) IV Continuous <Continuous>  dextrose 5%. 1000 milliLiter(s) (50 mL/Hr) IV Continuous <Continuous>  dextrose 50% Injectable 25 Gram(s) IV Push once  dextrose 50% Injectable 12.5 Gram(s) IV Push once  dextrose 50% Injectable 25 Gram(s) IV Push once  ertapenem  IVPB 1000 milliGRAM(s) IV Intermittent every 24 hours  folic acid 1 milliGRAM(s) Oral daily  glucagon  Injectable 1 milliGRAM(s) IntraMuscular once  heparin   Injectable 5000 Unit(s) SubCutaneous every 12 hours  insulin glargine Injectable (LANTUS) 8 Unit(s) SubCutaneous every morning  insulin lispro (ADMELOG) corrective regimen sliding scale   SubCutaneous every 6 hours  lactobacillus acidophilus 1 Tablet(s) Oral daily  LORazepam     Tablet 2 milliGRAM(s) Oral every 4 hours  methocarbamol 500 milliGRAM(s) Oral two times a day  multivitamin 1 Tablet(s) Oral daily  nystatin Powder 1 Application(s) Topical three times a day  pantoprazole  Injectable 40 milliGRAM(s) IV Push daily  polyethylene glycol 3350 17 Gram(s) Oral every 12 hours  povidone iodine 10% Solution 1 Application(s) Topical daily  senna 3 Tablet(s) Oral at bedtime  simethicone 80 milliGRAM(s) Chew every 6 hours    MEDICATIONS  (PRN):  acetaminophen     Tablet .. 650 milliGRAM(s) Oral every 6 hours PRN Temp greater or equal to 38C (100.4F), Mild Pain (1 - 3)  aluminum hydroxide/magnesium hydroxide/simethicone Suspension 30 milliLiter(s) Oral every 4 hours PRN Dyspepsia  dextrose Oral Gel 15 Gram(s) Oral once PRN Blood Glucose LESS THAN 70 milliGRAM(s)/deciliter  melatonin 3 milliGRAM(s) Oral at bedtime PRN Insomnia  ondansetron Injectable 4 milliGRAM(s) IV Push every 8 hours PRN Nausea and/or Vomiting  sodium chloride 0.9% lock flush 10 milliLiter(s) IV Push every 1 hour PRN Pre/post blood products, medications, blood draw, and to maintain line patency      Diet, NPO with Tube Feed:   Tube Feeding Modality: Gastrostomy  Glucerna 1.5 Horacio  Total Volume for 24 Hours (mL): 1200  Continuous  Until Goal Tube Feed Rate (mL per Hour): 60  Tube Feed Duration (in Hours): 20  Tube Feed Start Time: 00:00  Free Water Flush  Pump   Rate (mL per Hour): 25   Frequency: Every Hour    Duration (Hours): 24 (22 @ 20:02) [Active]  Diet, NPO (22 @ 20:02) [Active]          Vital Signs Last 24 Hrs  T(C): 36.7 (19 Aug 2022 07:33), Max: 37.6 (18 Aug 2022 20:18)  T(F): 98.1 (19 Aug 2022 07:33), Max: 99.7 (18 Aug 2022 20:18)  HR: 88 (19 Aug 2022 08:01) (77 - 138)  BP: 128/72 (19 Aug 2022 08:01) (81/65 - 181/88)  BP(mean): 88 (19 Aug 2022 08:01) (63 - 113)  RR: 27 (19 Aug 2022 08:01) (18 - 40)  SpO2: 100% (19 Aug 2022 08:01) (91% - 100%)    Parameters below as of 19 Aug 2022 07:10  Patient On (Oxygen Delivery Method): ventilator          22 @ 07:01  -  22 @ 07:00  --------------------------------------------------------  IN: 0 mL / OUT: 900 mL / NET: -900 mL        Mode: AC/ CMV (Assist Control/ Continuous Mandatory Ventilation), RR (machine): 16, TV (machine): 400, FiO2: 60, PEEP: 5, ITime: 1, MAP: 18, PIP: 38      LABS:                        7.5    6.78  )-----------( 272      ( 19 Aug 2022 06:36 )             24.6     08-19    138  |  102  |  46<H>  ----------------------------<  146<H>  4.5   |  27  |  1.10    Ca    8.5      19 Aug 2022 06:36  Phos  3.1     08-19  Mg     2.7     08-19    TPro  7.0  /  Alb  2.0<L>  /  TBili  0.3  /  DBili  x   /  AST  11  /  ALT  11  /  AlkPhos  99  08-19    PT/INR - ( 18 Aug 2022 18:45 )   PT: 13.2 sec;   INR: 1.12 ratio         PTT - ( 18 Aug 2022 18:45 )  PTT:45.9 sec  Urinalysis Basic - ( 18 Aug 2022 20:00 )    Color: Yellow / Appearance: Clear / S.015 / pH: x  Gluc: x / Ketone: Trace  / Bili: Negative / Urobili: Negative mg/dL   Blood: x / Protein: 100 mg/dL / Nitrite: Negative   Leuk Esterase: Trace / RBC: 3-5 /HPF / WBC 3-5   Sq Epi: x / Non Sq Epi: Moderate / Bacteria: Few        ABG - ( 18 Aug 2022 20:06 )  pH, Arterial: 7.48  pH, Blood: x     /  pCO2: 34    /  pO2: 168   / HCO3: 25    / Base Excess: 1.8   /  SaO2: 98.8                WBC:  WBC Count: 6.78 K/uL ( @ 06:36)  WBC Count: 25.06 K/uL ( @ 18:45)      MICROBIOLOGY:  RECENT CULTURES:              PT/INR - ( 18 Aug 2022 18:45 )   PT: 13.2 sec;   INR: 1.12 ratio         PTT - ( 18 Aug 2022 18:45 )  PTT:45.9 sec    Sodium:  Sodium, Serum: 138 mmol/L ( @ 06:36)  Sodium, Serum: 139 mmol/L ( @ 02:46)  Sodium, Serum: 135 mmol/L ( @ 18:45)      1.10 mg/dL  @ 06:36  1.27 mg/dL  @ 02:46  1.26 mg/dL  @ 18:45      Hemoglobin:  Hemoglobin: 7.5 g/dL ( @ 06:36)  Hemoglobin: 9.8 g/dL ( @ 18:45)      Platelets: Platelet Count - Automated: 272 K/uL ( @ 06:36)  Platelet Count - Automated: 466 K/uL ( @ 18:45)      LIVER FUNCTIONS - ( 19 Aug 2022 06:36 )  Alb: 2.0 g/dL / Pro: 7.0 g/dL / ALK PHOS: 99 U/L / ALT: 11 U/L DA / AST: 11 U/L / GGT: x             Urinalysis Basic - ( 18 Aug 2022 20:00 )    Color: Yellow / Appearance: Clear / S.015 / pH: x  Gluc: x / Ketone: Trace  / Bili: Negative / Urobili: Negative mg/dL   Blood: x / Protein: 100 mg/dL / Nitrite: Negative   Leuk Esterase: Trace / RBC: 3-5 /HPF / WBC 3-5   Sq Epi: x / Non Sq Epi: Moderate / Bacteria: Few        RADIOLOGY & ADDITIONAL STUDIES:      MICROBIOLOGY:  RECENT CULTURES:

## 2022-08-19 NOTE — CONSULT NOTE ADULT - ASSESSMENT
The patient is a 76 year old female with a history of HTN, DM, CVA, dementia, chronic respiratory failure s/p trach, PEG, cardiac arrest, anemia, pleural effusions who presents with respiratory distress.    Plan:  - Echo 5/30/22 with normal LV systolic function, mod pulm HTN  - Repeat echo similar. IVC small/collapsible, especially in a patient receiving positive pressure ventilation suggestive of hypovolemia.  - CT chest with moderate bilateral pleural effusions and upper lobe PNA  - Pleural effusions previously were exudative, likely parapneumonic  - Check BNP  - Received furosemide 40 mg IV; hold off on additional IV diuretics for now - can resume furosemide 20 mg PO daily  - IV antibiotics  - Pulm eval

## 2022-08-19 NOTE — CONSULT NOTE ADULT - ASSESSMENT
76F with dementia, COPD with chronic resp failure and is vent dependent, s/p PEG, hx of cardiac arrest, CHF, cor pulmonale, CVA, anemia, multiple admissions for respiratory distress who presents from [facility] for respiratory distress again.     acute on chronic resp failure  vent dependence  PNA, sepsis  CHF  most recent cultures w/ serratia and CRe pseudomonas  switch ertapenem to zosyn and bactrim 2DS tabs bid  monitor O2 requirements  suction/ trach care per ICU team  obtain cultures via mini-BAL is possible  f/u blood cultures  trend temps/ wbc  pulm evaluation  BNP elevated, diuresis per primary team    anemia  transfusion per primary team    Emil Medellin M.D.  [facility], Division of Infectious Diseases  [phone]  After 5pm on weekdays and all day on weekends - please call [phone]  76F with dementia, COPD with chronic resp failure and is vent dependent, s/p PEG, hx of cardiac arrest, CHF, cor pulmonale, CVA, anemia, multiple admissions for respiratory distress who presents from [facility] for respiratory distress again.     acute on chronic resp failure  vent dependence  PNA, sepsis  CHF  most recent cultures w/ serratia and CRe pseudomonas  switch ertapenem to zosyn and bactrim 2DS tabs bid  monitor O2 requirements  suction/ trach care per ICU team  obtain cultures via mini-BAL is possible  f/u blood cultures  trend temps/ wbc  pulm evaluation  BNP elevated, diuresis per primary team    anemia  transfusion per primary team    Dr. Haylee Umanzor will be covering 8/20-8/21     Emil Medellin M.D.  [facility], Division of Infectious Diseases  [phone]  After 5pm on weekdays and all day on weekends - please call [phone]

## 2022-08-19 NOTE — DIETITIAN INITIAL EVALUATION ADULT - NSFNSPHYEXAMSKINFT_GEN_A_CORE
Pressure Injury 1: right buttock, none  Pressure Injury 2: Left:,first toe, Unstageable  Pressure Injury 3: first toe, Unstageable

## 2022-08-19 NOTE — DIETITIAN INITIAL EVALUATION ADULT - NUTRITIONGOAL OUTCOME1
Pt to consume >80% of estimated protein/energy needs during hospital stay  Pt will show no s/s of hypo/hyperglycemia
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
## 2022-08-19 NOTE — DIETITIAN INITIAL EVALUATION ADULT - SIGNS/SYMPTOMS
as evidenced by >7.5% wt loss in 3 month, moderate-severe muscle loss as evidenced by elevated fingersticks 147-286 x24 hr; A1c 6.4% (4/22/22)

## 2022-08-19 NOTE — PROGRESS NOTE ADULT - ASSESSMENT
Patient is a 75 yo female with a pmh of dementia, COPD with chronic resp failure;  vent dependent, s/p PEG, cardiac arrest, CHF, cor pulmonale, CVA, COVID-19, CKD, anemia, who presented to  ED with resp distress, now admitted to SPCU.    Problem:  - Acute on chronic hypoxic resp failure  - Severe sepsis  - Systolic HF exacerbation / pulm edema  - Hyperkalemia -> resolved  - Anemia -? blood loss  - CKD  - Acidosis -> resolved    Plan:  Neuro: On ativan PO outpatient for comfort / vent compliance, will c/w in hospital. Previously had PRN versed, though noted to have hypotension. Utilize PRN fentanyl for comfort/pain if needed.   Respiratory: Patient currently on Full vent support, vent setting 16/400/5/50 -> titrated fio2 to 40%. Titrate settings to maintain SaO2 >90%, and pH >7.25, consider low tidal volume ventilation strategy w/ goal Tv 4-8 cc/kg of ideal body weight, plateau pressure goal <30. Peridex oral care. Noted to have pleural effusions -? if cardiac component. Will add duonebs for wheezing and hx of COPD. Increased thick secretions, will add hypersal to assist in thinning.   Cardiac: RSR. Prior hx of CHF exacerbation, TTE showing collapsable IVC, - ? hypovolemia. Planned to receive lasix s/p PRBC transfusion, will give 20mg instead of 40mg.  GI: NPO except TF at this time. PEG in place.   /Renal: Noted to have hx of CKD, avoid nephrotoxic agents. Previously with hyperkalemic at 5.7, now 4.5. Woody catheter for accurate I&O while diuresis. Trend cmp and K, replace electrolytes as needed.  ID: Patient noted to have midline on admission, removed. Urine and blood cultures obtained, pending sputum. ID following, abx changed from ertapenem to zosyn and bactrim based on prior cultures. Adjust abx on new cultures growth and sensitivity. Trend CBC, repeat lactate. Assess for fevers.  Hem/Onc: Anemia, though no s/s of bleeding, FOBT negative.  Continue to monitor for s/s of bleeding at this time. VTE ppx with sub q heparin. Currently receiving x1 PRBC, assess hgb x2 hour post infusion. Transfuse if hgb <7.  Endo: Goal glucose 100-180. q6 blood glucose monitoring.   Dispo: Full code, SPCU.     Case discussed with EICU attending Dr. Buck

## 2022-08-19 NOTE — PROVIDER CONTACT NOTE (CRITICAL VALUE NOTIFICATION) - ASSESSMENT
The patient's assessment was reviewed with Dr. Naranjo and a collaborative plan of care was established. Pt is at baseline.

## 2022-08-19 NOTE — DIETITIAN INITIAL EVALUATION ADULT - ORAL INTAKE PTA/DIET HISTORY
Per transfer documents, pt from [facility], was NPO with tube feed Glucerna 1.5, goal rate at 60ml/hr (provides 1200ml TV formula, 1800kcal, 99g protein), 250ml free water q6hr, LPS 30ml BID.

## 2022-08-19 NOTE — DIETITIAN INITIAL EVALUATION ADULT - PERTINENT LABORATORY DATA
08-19    138  |  102  |  46<H>  ----------------------------<  146<H>  4.5   |  27  |  1.10    Ca    8.5      19 Aug 2022 06:36  Phos  3.1     08-19  Mg     2.7     08-19    TPro  7.0  /  Alb  2.0<L>  /  TBili  0.3  /  DBili  x   /  AST  11  /  ALT  11  /  AlkPhos  99  08-19  POCT Blood Glucose.: 199 mg/dL (08-19-22 @ 11:47)  A1C with Estimated Average Glucose Result: 6.4 % (04-22-22 @ 12:14)  A1C with Estimated Average Glucose Result: 6.2 % (12-09-21 @ 14:24)  A1C with Estimated Average Glucose Result: 6.6 % (09-29-21 @ 21:06)

## 2022-08-19 NOTE — DIETITIAN INITIAL EVALUATION ADULT - OTHER INFO
Per H&P, "76F with dementia, COPD with chronic resp failure and is vent dependent, s/p PEG, hx of cardiac arrest, CHF, cor pulmonale, CVA, COVID-19 infxn, CKD, anemia, multiple admissions for respiratory distress (last admission from 6/1-6/9 diagnosed with PNA with parapneumonic pleural effusion s/p thoracentesis and Avycaz) who presents from [facility] for respiratory distress again.   Likely with repeat PNA especially given new CXR findings.   Meets severe sepsis criteria with tachypnea + leukocytosis + lactic acid + source of infection."    Pt admitted with unstageable to R buttock and left 1st toe. Pt with hx trach/PEG. Per transfer documents, receiving Glucerna 1.5 to goal 60ml/hr x 20hrs (provides 1200ml TV formula, 1800kcal, 99g protein; also receiving 250ml free water q 6hrs, +25ml hourly flushes). Pt currently receiving tube feeds per MD order of Glucerna 1.5 via peg at same goal rate pta; observed feeds running at 50ml/hr, noted initiated yesterday. Pt currently trached, on vent. Pertinent medications/nutrition labs reviewed; -286 x24hr, elevated BUN, A1c 6.4% (4/22/22); ordered for lantus, admelog, multivitamin and folic acid. Completed IV albumin, NaCl in house.     Current dosing wt 139.9#, daily wt 100.9# (8-19). Previous adm weight of 119#. 18#/15% weight loss in 2 month is clinically significant. No edema noted. +BM 8/18, bowel regimen in place, noted on multiple antibiotics. RD to follow-up and will continue to monitor pt's nutrition status/tolerance to EN prescription.   Per H&P, "76F with dementia, COPD with chronic resp failure and is vent dependent, s/p PEG, hx of cardiac arrest, CHF, cor pulmonale, CVA, COVID-19 infxn, CKD, anemia, multiple admissions for respiratory distress (last admission from 6/1-6/9 diagnosed with PNA with parapneumonic pleural effusion s/p thoracentesis and Avycaz) who presents from [facility] for respiratory distress again.   Likely with repeat PNA especially given new CXR findings.   Meets severe sepsis criteria with tachypnea + leukocytosis + lactic acid + source of infection."    Pt admitted with unstageable to R buttock and left 1st toe. Pt with hx trach/PEG. Pt currently receiving tube feeds per MD order of Glucerna 1.5 via peg at same goal rate pta; observed feeds running at 50ml/hr, noted initiated yesterday. Recommend decrease current tube feed order goal rate from 60ml/hr to 45ml/hr as pt vent dependent at this time. New goal rate will provide 900ml total volume, 1350kcal, 74g pro, 683ml free water. Recommend additional free water 200ml q 6hr. Pt currently trached, on vent. Pertinent medications/nutrition labs reviewed; -286 x24hr, elevated BUN, A1c 6.4% (4/22/22); ordered for lantus, admelog, multivitamin and folic acid. Completed IV albumin, NaCl in house.     Current dosing wt 139.9#, daily wt 100.9# ?  accuracy(8-19). Previous adm weight of 119#, transfer wt 118.6#. Wt appears to be stable x2 month. No edema noted. +BM 8/18, bowel regimen in place, noted on multiple antibiotics. RD to follow-up and will continue to monitor pt's nutrition status/tolerance to EN prescription.

## 2022-08-19 NOTE — PROGRESS NOTE ADULT - ASSESSMENT
REVIEW OF SYMPTOMS      Able to give (reliable) ROS  NO     PHYSICAL EXAM    HEENT Unremarkable  atraumatic   RESP Fair air entry EXP prolonged    Harsh breath sound Resp distres mild   CARDIAC S1 S2 No S3     NO JVD    ABDOMEN SOFT BS PRESENT NOT DISTENDED No hepatosplenomegaly   PEDAL EDEMA present No calf tenderness  NO rash       AGE/SEX.   76 f  DOA.  8/18/2022  CC .  8/18/2022 respiratory failure, chronic trach vent, full code, recent Pneumonia/pleural effusion  shortness of breath    PROCEDURE  8/19/2022 [facility]     HOSPITAL COURSE.   WOUND CARE 8/18/2022   PNEUMONIA 8/18/2022   LACTICEMIA 8/18/2022  VENT DEPENDENT  RESP FAILURE 8/18/2022    ANEMIA 8/18-8/19/2022 Hb 9.8 - 7    HYPERKALEMIA 8/18/2022   RYAN 8/18/2022   DM     PMH .  pmh Pneumonia    pmh 5/2/2022 SERRATIA CARBAPENEM RESISTANT PSEUDOMONAS   pmh HTN,   pmh DM,   pmh CVA,   pmh  chronic respiratory failure   pmh s/p trach, PEG,   pmh cardiac arrest  pmh Pl effsn  r PL EFFSN   6/8/2022 r thorac g 161 l 222 p 4.9 p 10 l 16 .38    l pl effsn  5/25/2022 g 183 l 144/381 .37 p 3.4/5.3 .64 p 23 l 36   5/25/2022l pl fluid  lymph pred exudate   cyto (-)         GENERAL DATA .   GOC.  8/18/2022 full code      ALLGY.   codeine                          WT.     8/18/2022 63                               BMI.     8/18/2022 23                         ICU STAY. 8/18/2022  COVID. 8/18/2022 scv2 (-)       BEST PRACTICE ISSUES.    HOB ELEVATN. Yes  DVT PPLX.  8/18/2022 hpsc     SQUIRES PPLX.  8/18/2022 protonix 40     INFN PPLX. 8/18/2022 chlorhexidine .12%     8/19/2022 chlorhexidine 4%   SP SW EM.        DIET. 8/18/2022 gflucerna 1200 gt                 VS/ PO/IO/ VENT/ DRIPS.   8/19/2022 afeb 120/60   8/19/2022 ac 16/400/6/.4     ASSESSMENT/RECOMMENDATIONS .   RESP.  VENT MANAGEMENT.  HOB elevation  Target Pplat 30 (-)  Target PO 90-95%  Target pH 730 (+)  Daily spontaneous breathing trials   Daily sedation vacation     GAS EXCHANGE.  vbg 8/18/2022 vbg pH 737   8/18/2022 8p   vent 100% 16/400 748/34/168     COPD.   8/18/2022 duoneb     INFECTION.  WOUND CARE  8/18/2022 povidone l and r great toe   8/19/2022 collagenase buttocks     PNEUMONIA .  w 8/18-8/19/2022 w 25 - 11   ua 8/18/2022 w 3-5   ct ch 8/18   persistent mod bl effs   underlying dependent airspace consolidation   septal thickening and patchy ground glass opacities maddie r lung   rvp 8/18/2022 (-)  8/19/2022  zosyn Se Vignesh  8/19/2022 bactrim ds 2 bid   a/r.  PAST MICROBIO.   5/2/2022 SERRATIA CARBAPENEM RESISTANT PSEUDOMONAS     CARDIAC.  LACTICEMIA.  la 8/18-8/19/2022 la 3.7- 1.2     CHF.  bnp 8/19/2022 bnp [id number]    echo 8/19/2022 n lvsf dd1 pasp 58   8/19/2022 lasix 40 one dose     GI.  HEMAT.  ANEMIA.   Hb 8/18-8/19/2022 Hb 9.8 - 7    8/19/2022 7:21 PM recheck Hb   target Hb 7 (+)     RENAL.  HYPERKALEMIA.  K 8/18-8/19/2022 k 5.7- 4.5     RYAN.  Cr 8/18-8/19/2022 Cr 1.2- 1.1    monitor    IV fl.  none     ENDOCRINE.   DM.  8/18/2022 Insulin     NEURO.  8/19/2022 lorazepam 2.6   8/19/2022 methocarbamol 500.2     TIME SPENT   Over 39 minutes aggregate critical  care time spent on encounter; activities included   direct patient care, counseling and/or coordinating care reviewing notes, lab data/ imaging , discussion with multidisciplinary team/ patient  /family and explaining in detail risks, benefits, alternatives  of the recommendations     CHAPINCITO GUIDRY 76 f NW S 8/18/2022   DR JOSEPH DU

## 2022-08-19 NOTE — DIETITIAN INITIAL EVALUATION ADULT - PERTINENT MEDS FT
MEDICATIONS  (STANDING):  albuterol/ipratropium for Nebulization 3 milliLiter(s) Nebulizer every 6 hours  chlorhexidine 0.12% Liquid 15 milliLiter(s) Oral Mucosa every 12 hours  chlorhexidine 0.12% Liquid 15 milliLiter(s) Oral Mucosa every 12 hours  chlorhexidine 4% Liquid 1 Application(s) Topical <User Schedule>  collagenase Ointment 1 Application(s) Topical daily  dextrose 5%. 1000 milliLiter(s) (100 mL/Hr) IV Continuous <Continuous>  dextrose 5%. 1000 milliLiter(s) (50 mL/Hr) IV Continuous <Continuous>  dextrose 50% Injectable 25 Gram(s) IV Push once  dextrose 50% Injectable 12.5 Gram(s) IV Push once  dextrose 50% Injectable 25 Gram(s) IV Push once  ertapenem  IVPB 1000 milliGRAM(s) IV Intermittent every 24 hours  folic acid 1 milliGRAM(s) Oral daily  glucagon  Injectable 1 milliGRAM(s) IntraMuscular once  heparin   Injectable 5000 Unit(s) SubCutaneous every 12 hours  insulin glargine Injectable (LANTUS) 8 Unit(s) SubCutaneous every morning  insulin lispro (ADMELOG) corrective regimen sliding scale   SubCutaneous every 6 hours  lactobacillus acidophilus 1 Tablet(s) Oral daily  LORazepam     Tablet 2 milliGRAM(s) Oral every 4 hours  methocarbamol 500 milliGRAM(s) Oral two times a day  multivitamin 1 Tablet(s) Oral daily  nystatin Powder 1 Application(s) Topical three times a day  pantoprazole  Injectable 40 milliGRAM(s) IV Push daily  polyethylene glycol 3350 17 Gram(s) Oral every 12 hours  povidone iodine 10% Solution 1 Application(s) Topical daily  senna 3 Tablet(s) Oral at bedtime  simethicone 80 milliGRAM(s) Chew every 6 hours    MEDICATIONS  (PRN):  acetaminophen     Tablet .. 650 milliGRAM(s) Oral every 6 hours PRN Temp greater or equal to 38C (100.4F), Mild Pain (1 - 3)  aluminum hydroxide/magnesium hydroxide/simethicone Suspension 30 milliLiter(s) Oral every 4 hours PRN Dyspepsia  dextrose Oral Gel 15 Gram(s) Oral once PRN Blood Glucose LESS THAN 70 milliGRAM(s)/deciliter  melatonin 3 milliGRAM(s) Oral at bedtime PRN Insomnia  ondansetron Injectable 4 milliGRAM(s) IV Push every 8 hours PRN Nausea and/or Vomiting  sodium chloride 0.9% lock flush 10 milliLiter(s) IV Push every 1 hour PRN Pre/post blood products, medications, blood draw, and to maintain line patency

## 2022-08-19 NOTE — DIETITIAN INITIAL EVALUATION ADULT - ENTERAL
Decreased feed rate to 45ml/hr to provide 900ml total volume, 1350kcal, 74g pro, 683ml free water. Recommend additional free water 200ml q 6hr.

## 2022-08-19 NOTE — PROGRESS NOTE ADULT - SUBJECTIVE AND OBJECTIVE BOX
Patient is a 76y old  Female who presents with a chief complaint of respiratory distress (19 Aug 2022 09:52)      INTERVAL HPI/OVERNIGHT EVENTS:    MEDICATIONS  (STANDING):  albuterol/ipratropium for Nebulization 3 milliLiter(s) Nebulizer every 6 hours  chlorhexidine 0.12% Liquid 15 milliLiter(s) Oral Mucosa every 12 hours  chlorhexidine 0.12% Liquid 15 milliLiter(s) Oral Mucosa every 12 hours  chlorhexidine 4% Liquid 1 Application(s) Topical <User Schedule>  collagenase Ointment 1 Application(s) Topical daily  dextrose 5%. 1000 milliLiter(s) (100 mL/Hr) IV Continuous <Continuous>  dextrose 5%. 1000 milliLiter(s) (50 mL/Hr) IV Continuous <Continuous>  dextrose 50% Injectable 25 Gram(s) IV Push once  dextrose 50% Injectable 12.5 Gram(s) IV Push once  dextrose 50% Injectable 25 Gram(s) IV Push once  ertapenem  IVPB 1000 milliGRAM(s) IV Intermittent every 24 hours  folic acid 1 milliGRAM(s) Oral daily  glucagon  Injectable 1 milliGRAM(s) IntraMuscular once  heparin   Injectable 5000 Unit(s) SubCutaneous every 12 hours  insulin glargine Injectable (LANTUS) 8 Unit(s) SubCutaneous every morning  insulin lispro (ADMELOG) corrective regimen sliding scale   SubCutaneous every 6 hours  lactobacillus acidophilus 1 Tablet(s) Oral daily  LORazepam     Tablet 2 milliGRAM(s) Oral every 4 hours  methocarbamol 500 milliGRAM(s) Oral two times a day  multivitamin 1 Tablet(s) Oral daily  nystatin Powder 1 Application(s) Topical three times a day  pantoprazole  Injectable 40 milliGRAM(s) IV Push daily  polyethylene glycol 3350 17 Gram(s) Oral every 12 hours  povidone iodine 10% Solution 1 Application(s) Topical daily  senna 3 Tablet(s) Oral at bedtime  simethicone 80 milliGRAM(s) Chew every 6 hours    MEDICATIONS  (PRN):  acetaminophen     Tablet .. 650 milliGRAM(s) Oral every 6 hours PRN Temp greater or equal to 38C (100.4F), Mild Pain (1 - 3)  aluminum hydroxide/magnesium hydroxide/simethicone Suspension 30 milliLiter(s) Oral every 4 hours PRN Dyspepsia  dextrose Oral Gel 15 Gram(s) Oral once PRN Blood Glucose LESS THAN 70 milliGRAM(s)/deciliter  melatonin 3 milliGRAM(s) Oral at bedtime PRN Insomnia  ondansetron Injectable 4 milliGRAM(s) IV Push every 8 hours PRN Nausea and/or Vomiting  sodium chloride 0.9% lock flush 10 milliLiter(s) IV Push every 1 hour PRN Pre/post blood products, medications, blood draw, and to maintain line patency      Allergies    codeine (Hives)    Intolerances        REVIEW OF SYSTEMS:  CONSTITUTIONAL: No fever, weight loss, or fatigue  EYES: No eye pain, visual disturbances, or discharge  ENMT:  No difficulty hearing, tinnitus, vertigo; No sinus or throat pain  NECK: No pain or stiffness  RESPIRATORY: No cough, wheezing, chills or hemoptysis; No shortness of breath  CARDIOVASCULAR: No chest pain, palpitations, lightheadedness, or leg swelling  GASTROINTESTINAL: No abdominal or epigastric pain. No nausea, vomiting, or hematemesis; No diarrhea or constipation. No melena or hematochezia.  GENITOURINARY: No dysuria, frequency, hematuria, or incontinence  NEUROLOGICAL: No headaches, memory loss, vertigo, loss of strength, numbness, or tremors  SKIN: No itching, burning, rashes, or lesions   LYMPH NODES: No enlarged glands  ENDOCRINE: No heat or cold intolerance; No hair loss; No polydipsia or polyuria  MUSCULOSKELETAL: No joint pain or swelling; No muscle, back, or extremity pain  PSYCHIATRIC: No depression, anxiety, or mood swings  HEME/LYMPH: No easy bruising, or bleeding gums  ALLERGY AND IMMUNOLOGIC: No hives or eczema    PHYSICAL EXAM:  GENERAL: NAD, well-groomed, well-developed  HEAD:  Atraumatic, Normocephalic  EYES: EOMI, PERRLA, conjunctiva and sclera clear  ENMT: Moist mucous membranes, Good dentition, No lesions  NECK: Supple, No JVD appreciated  NERVOUS SYSTEM:  Alert & Oriented X3, Good concentration; All 4 extremities mobile, no gross sensory deficits.   CHEST/LUNG: Clear to auscultation bilaterally; No rales, rhonchi, wheezing, or rubs appreciated  HEART: Regular rate and rhythm; No murmurs, rubs, or gallops  ABDOMEN: Soft, Nontender, Nondistended; Bowel sounds present  EXTREMITIES:  No clubbing, cyanosis, or edema appreciated  LYMPH: No lymphadenopathy noted  SKIN: No rashes or lesions appreciated    Vital Signs Last 24 Hrs  T(C): 36.7 (19 Aug 2022 07:33), Max: 37.6 (18 Aug 2022 20:18)  T(F): 98.1 (19 Aug 2022 07:33), Max: 99.7 (18 Aug 2022 20:18)  HR: 88 (19 Aug 2022 10:46) (77 - 138)  BP: 113/62 (19 Aug 2022 09:00) (81/65 - 181/88)  BP(mean): 75 (19 Aug 2022 09:00) (63 - 113)  RR: 29 (19 Aug 2022 09:00) (18 - 40)  SpO2: 100% (19 Aug 2022 10:46) (91% - 100%)    Parameters below as of 19 Aug 2022 07:10  Patient On (Oxygen Delivery Method): ventilator        LABS:                        7.5    6.78  )-----------( 272      ( 19 Aug 2022 06:36 )             24.6     19 Aug 2022 06:36    138    |  102    |  46     ----------------------------<  146    4.5     |  27     |  1.10     Ca    8.5        19 Aug 2022 06:36  Phos  3.1       19 Aug 2022 06:36  Mg     2.7       19 Aug 2022 06:36    TPro  7.0    /  Alb  2.0    /  TBili  0.3    /  DBili  x      /  AST  11     /  ALT  11     /  AlkPhos  99     19 Aug 2022 06:36    PT/INR - ( 18 Aug 2022 18:45 )   PT: 13.2 sec;   INR: 1.12 ratio         PTT - ( 18 Aug 2022 18:45 )  PTT:45.9 sec  Urinalysis Basic - ( 18 Aug 2022 20:00 )    Color: Yellow / Appearance: Clear / S.015 / pH: x  Gluc: x / Ketone: Trace  / Bili: Negative / Urobili: Negative mg/dL   Blood: x / Protein: 100 mg/dL / Nitrite: Negative   Leuk Esterase: Trace / RBC: 3-5 /HPF / WBC 3-5   Sq Epi: x / Non Sq Epi: Moderate / Bacteria: Few      CAPILLARY BLOOD GLUCOSE      POCT Blood Glucose.: 147 mg/dL (19 Aug 2022 08:44)  POCT Blood Glucose.: 156 mg/dL (19 Aug 2022 05:09)  POCT Blood Glucose.: 244 mg/dL (19 Aug 2022 00:22)  POCT Blood Glucose.: 286 mg/dL (18 Aug 2022 22:02)      RADIOLOGY & ADDITIONAL TESTS:    Imaging Personally Reviewed:  [ ] YES     Consultant(s) Notes Reviewed:      Care Discussed with Consultants/Other Providers:    Advanced Directives: [ ] DNR  [ ] No feeding tube  [ ] MOLST in chart  [ ] MOLST completed today  [ ] Unknown   Patient is a 76y old  Female who presents with a chief complaint of respiratory distress (19 Aug 2022 09:52)      INTERVAL HPI/OVERNIGHT EVENTS:  Patient seen awake in bed, not responding to commands or questions.  came to bedside and consented to blood transfusion.       MEDICATIONS  (STANDING):  albuterol/ipratropium for Nebulization 3 milliLiter(s) Nebulizer every 6 hours  chlorhexidine 0.12% Liquid 15 milliLiter(s) Oral Mucosa every 12 hours  chlorhexidine 0.12% Liquid 15 milliLiter(s) Oral Mucosa every 12 hours  chlorhexidine 4% Liquid 1 Application(s) Topical <User Schedule>  collagenase Ointment 1 Application(s) Topical daily  dextrose 5%. 1000 milliLiter(s) (100 mL/Hr) IV Continuous <Continuous>  dextrose 5%. 1000 milliLiter(s) (50 mL/Hr) IV Continuous <Continuous>  dextrose 50% Injectable 25 Gram(s) IV Push once  dextrose 50% Injectable 12.5 Gram(s) IV Push once  dextrose 50% Injectable 25 Gram(s) IV Push once  ertapenem  IVPB 1000 milliGRAM(s) IV Intermittent every 24 hours  folic acid 1 milliGRAM(s) Oral daily  glucagon  Injectable 1 milliGRAM(s) IntraMuscular once  heparin   Injectable 5000 Unit(s) SubCutaneous every 12 hours  insulin glargine Injectable (LANTUS) 8 Unit(s) SubCutaneous every morning  insulin lispro (ADMELOG) corrective regimen sliding scale   SubCutaneous every 6 hours  lactobacillus acidophilus 1 Tablet(s) Oral daily  LORazepam     Tablet 2 milliGRAM(s) Oral every 4 hours  methocarbamol 500 milliGRAM(s) Oral two times a day  multivitamin 1 Tablet(s) Oral daily  nystatin Powder 1 Application(s) Topical three times a day  pantoprazole  Injectable 40 milliGRAM(s) IV Push daily  polyethylene glycol 3350 17 Gram(s) Oral every 12 hours  povidone iodine 10% Solution 1 Application(s) Topical daily  senna 3 Tablet(s) Oral at bedtime  simethicone 80 milliGRAM(s) Chew every 6 hours    MEDICATIONS  (PRN):  acetaminophen     Tablet .. 650 milliGRAM(s) Oral every 6 hours PRN Temp greater or equal to 38C (100.4F), Mild Pain (1 - 3)  aluminum hydroxide/magnesium hydroxide/simethicone Suspension 30 milliLiter(s) Oral every 4 hours PRN Dyspepsia  dextrose Oral Gel 15 Gram(s) Oral once PRN Blood Glucose LESS THAN 70 milliGRAM(s)/deciliter  melatonin 3 milliGRAM(s) Oral at bedtime PRN Insomnia  ondansetron Injectable 4 milliGRAM(s) IV Push every 8 hours PRN Nausea and/or Vomiting  sodium chloride 0.9% lock flush 10 milliLiter(s) IV Push every 1 hour PRN Pre/post blood products, medications, blood draw, and to maintain line patency      Allergies    codeine (Hives)    Intolerances        REVIEW OF SYSTEMS unable to be performed given altered mental status.    PHYSICAL EXAM:  GENERAL: Adult female, NAD  HEAD:  Atraumatic, Normocephalic  ENMT: Moist mucous membranes  NECK: Trachostomy tube in place, midline  NERVOUS SYSTEM:  Stuporous.   CHEST/LUNG: diminished to auscultation bilaterally; No rales, rhonchi, wheezing, or rubs appreciated  HEART: Regular rate and rhythm; No murmurs, rubs, or gallops  ABDOMEN: Soft, Nontender, Nondistended  EXTREMITIES: Contractures of hands. No clubbing, cyanosis, or edema appreciated  SKIN: No rashes or lesions appreciated    Vital Signs Last 24 Hrs  T(C): 36.7 (19 Aug 2022 07:33), Max: 37.6 (18 Aug 2022 20:18)  T(F): 98.1 (19 Aug 2022 07:33), Max: 99.7 (18 Aug 2022 20:18)  HR: 88 (19 Aug 2022 10:46) (77 - 138)  BP: 113/62 (19 Aug 2022 09:00) (81/65 - 181/88)  BP(mean): 75 (19 Aug 2022 09:00) (63 - 113)  RR: 29 (19 Aug 2022 09:00) (18 - 40)  SpO2: 100% (19 Aug 2022 10:46) (91% - 100%)    Parameters below as of 19 Aug 2022 07:10  Patient On (Oxygen Delivery Method): ventilator        LABS:                        7.5    6.78  )-----------( 272      ( 19 Aug 2022 06:36 )             24.6     19 Aug 2022 06:36    138    |  102    |  46     ----------------------------<  146    4.5     |  27     |  1.10     Ca    8.5        19 Aug 2022 06:36  Phos  3.1       19 Aug 2022 06:36  Mg     2.7       19 Aug 2022 06:36    TPro  7.0    /  Alb  2.0    /  TBili  0.3    /  DBili  x      /  AST  11     /  ALT  11     /  AlkPhos  99     19 Aug 2022 06:36    PT/INR - ( 18 Aug 2022 18:45 )   PT: 13.2 sec;   INR: 1.12 ratio         PTT - ( 18 Aug 2022 18:45 )  PTT:45.9 sec  Urinalysis Basic - ( 18 Aug 2022 20:00 )    Color: Yellow / Appearance: Clear / S.015 / pH: x  Gluc: x / Ketone: Trace  / Bili: Negative / Urobili: Negative mg/dL   Blood: x / Protein: 100 mg/dL / Nitrite: Negative   Leuk Esterase: Trace / RBC: 3-5 /HPF / WBC 3-5   Sq Epi: x / Non Sq Epi: Moderate / Bacteria: Few      CAPILLARY BLOOD GLUCOSE      POCT Blood Glucose.: 147 mg/dL (19 Aug 2022 08:44)  POCT Blood Glucose.: 156 mg/dL (19 Aug 2022 05:09)  POCT Blood Glucose.: 244 mg/dL (19 Aug 2022 00:22)  POCT Blood Glucose.: 286 mg/dL (18 Aug 2022 22:02)     Patient is a 76y old  Female who presents with a chief complaint of respiratory distress (19 Aug 2022 09:52)      INTERVAL HPI/OVERNIGHT EVENTS:  Patient seen awake in bed, not responding to commands or questions.  came to bedside and consented to blood transfusion.       MEDICATIONS  (STANDING):  albuterol/ipratropium for Nebulization 3 milliLiter(s) Nebulizer every 6 hours  chlorhexidine 0.12% Liquid 15 milliLiter(s) Oral Mucosa every 12 hours  chlorhexidine 0.12% Liquid 15 milliLiter(s) Oral Mucosa every 12 hours  chlorhexidine 4% Liquid 1 Application(s) Topical <User Schedule>  collagenase Ointment 1 Application(s) Topical daily  dextrose 5%. 1000 milliLiter(s) (100 mL/Hr) IV Continuous <Continuous>  dextrose 5%. 1000 milliLiter(s) (50 mL/Hr) IV Continuous <Continuous>  dextrose 50% Injectable 25 Gram(s) IV Push once  dextrose 50% Injectable 12.5 Gram(s) IV Push once  dextrose 50% Injectable 25 Gram(s) IV Push once  ertapenem  IVPB 1000 milliGRAM(s) IV Intermittent every 24 hours  folic acid 1 milliGRAM(s) Oral daily  glucagon  Injectable 1 milliGRAM(s) IntraMuscular once  heparin   Injectable 5000 Unit(s) SubCutaneous every 12 hours  insulin glargine Injectable (LANTUS) 8 Unit(s) SubCutaneous every morning  insulin lispro (ADMELOG) corrective regimen sliding scale   SubCutaneous every 6 hours  lactobacillus acidophilus 1 Tablet(s) Oral daily  LORazepam     Tablet 2 milliGRAM(s) Oral every 4 hours  methocarbamol 500 milliGRAM(s) Oral two times a day  multivitamin 1 Tablet(s) Oral daily  nystatin Powder 1 Application(s) Topical three times a day  pantoprazole  Injectable 40 milliGRAM(s) IV Push daily  polyethylene glycol 3350 17 Gram(s) Oral every 12 hours  povidone iodine 10% Solution 1 Application(s) Topical daily  senna 3 Tablet(s) Oral at bedtime  simethicone 80 milliGRAM(s) Chew every 6 hours    MEDICATIONS  (PRN):  acetaminophen     Tablet .. 650 milliGRAM(s) Oral every 6 hours PRN Temp greater or equal to 38C (100.4F), Mild Pain (1 - 3)  aluminum hydroxide/magnesium hydroxide/simethicone Suspension 30 milliLiter(s) Oral every 4 hours PRN Dyspepsia  dextrose Oral Gel 15 Gram(s) Oral once PRN Blood Glucose LESS THAN 70 milliGRAM(s)/deciliter  melatonin 3 milliGRAM(s) Oral at bedtime PRN Insomnia  ondansetron Injectable 4 milliGRAM(s) IV Push every 8 hours PRN Nausea and/or Vomiting  sodium chloride 0.9% lock flush 10 milliLiter(s) IV Push every 1 hour PRN Pre/post blood products, medications, blood draw, and to maintain line patency      Allergies    codeine (Hives)    Intolerances        REVIEW OF SYSTEMS unable to be performed given altered mental status.    PHYSICAL EXAM:  GENERAL: Adult female, NAD  HEAD:  Atraumatic, Normocephalic  ENMT: Moist mucous membranes  NECK: Trachostomy tube in place, midline  NERVOUS SYSTEM:  Stuporous.   CHEST/LUNG: diminished to auscultation bilaterally; No rales, rhonchi, wheezing, or rubs appreciated  HEART: Regular rate and rhythm; No murmurs, rubs, or gallops  ABDOMEN: Soft, Nontender, Nondistended. Cloudy urine in vaughn bag  EXTREMITIES: Contractures of hands. No clubbing, cyanosis, or edema appreciated  SKIN: No rashes or lesions appreciated    Vital Signs Last 24 Hrs  T(C): 36.7 (19 Aug 2022 07:33), Max: 37.6 (18 Aug 2022 20:18)  T(F): 98.1 (19 Aug 2022 07:33), Max: 99.7 (18 Aug 2022 20:18)  HR: 88 (19 Aug 2022 10:46) (77 - 138)  BP: 113/62 (19 Aug 2022 09:00) (81/65 - 181/88)  BP(mean): 75 (19 Aug 2022 09:00) (63 - 113)  RR: 29 (19 Aug 2022 09:00) (18 - 40)  SpO2: 100% (19 Aug 2022 10:46) (91% - 100%)    Parameters below as of 19 Aug 2022 07:10  Patient On (Oxygen Delivery Method): ventilator        LABS:                        7.5    6.78  )-----------( 272      ( 19 Aug 2022 06:36 )             24.6     19 Aug 2022 06:36    138    |  102    |  46     ----------------------------<  146    4.5     |  27     |  1.10     Ca    8.5        19 Aug 2022 06:36  Phos  3.1       19 Aug 2022 06:36  Mg     2.7       19 Aug 2022 06:36    TPro  7.0    /  Alb  2.0    /  TBili  0.3    /  DBili  x      /  AST  11     /  ALT  11     /  AlkPhos  99     19 Aug 2022 06:36    PT/INR - ( 18 Aug 2022 18:45 )   PT: 13.2 sec;   INR: 1.12 ratio         PTT - ( 18 Aug 2022 18:45 )  PTT:45.9 sec  Urinalysis Basic - ( 18 Aug 2022 20:00 )    Color: Yellow / Appearance: Clear / S.015 / pH: x  Gluc: x / Ketone: Trace  / Bili: Negative / Urobili: Negative mg/dL   Blood: x / Protein: 100 mg/dL / Nitrite: Negative   Leuk Esterase: Trace / RBC: 3-5 /HPF / WBC 3-5   Sq Epi: x / Non Sq Epi: Moderate / Bacteria: Few      CAPILLARY BLOOD GLUCOSE      POCT Blood Glucose.: 147 mg/dL (19 Aug 2022 08:44)  POCT Blood Glucose.: 156 mg/dL (19 Aug 2022 05:09)  POCT Blood Glucose.: 244 mg/dL (19 Aug 2022 00:22)  POCT Blood Glucose.: 286 mg/dL (18 Aug 2022 22:02)

## 2022-08-19 NOTE — DIETITIAN INITIAL EVALUATION ADULT - NSFNSGIIOFT_GEN_A_CORE
room air   08-22-22 @ 07:01  -  08-23-22 @ 07:00  --------------------------------------------------------  OUT:  Total OUT: 0 mL    Total NET: 585 mL

## 2022-08-20 LAB
ALBUMIN SERPL ELPH-MCNC: 1.9 G/DL — LOW (ref 3.3–5)
ALP SERPL-CCNC: 104 U/L — SIGNIFICANT CHANGE UP (ref 30–120)
ALT FLD-CCNC: 11 U/L DA — SIGNIFICANT CHANGE UP (ref 10–60)
ANION GAP SERPL CALC-SCNC: 5 MMOL/L — SIGNIFICANT CHANGE UP (ref 5–17)
AST SERPL-CCNC: 11 U/L — SIGNIFICANT CHANGE UP (ref 10–40)
BILIRUB SERPL-MCNC: 0.6 MG/DL — SIGNIFICANT CHANGE UP (ref 0.2–1.2)
BUN SERPL-MCNC: 43 MG/DL — HIGH (ref 7–23)
CALCIUM SERPL-MCNC: 8.6 MG/DL — SIGNIFICANT CHANGE UP (ref 8.4–10.5)
CHLORIDE SERPL-SCNC: 102 MMOL/L — SIGNIFICANT CHANGE UP (ref 96–108)
CO2 SERPL-SCNC: 30 MMOL/L — SIGNIFICANT CHANGE UP (ref 22–31)
CREAT SERPL-MCNC: 1.31 MG/DL — HIGH (ref 0.5–1.3)
EGFR: 42 ML/MIN/1.73M2 — LOW
GLUCOSE SERPL-MCNC: 244 MG/DL — HIGH (ref 70–99)
GRAM STN FLD: SIGNIFICANT CHANGE UP
HCT VFR BLD CALC: 26.7 % — LOW (ref 34.5–45)
HCT VFR BLD CALC: 28.4 % — LOW (ref 34.5–45)
HGB BLD-MCNC: 8.3 G/DL — LOW (ref 11.5–15.5)
HGB BLD-MCNC: 8.9 G/DL — LOW (ref 11.5–15.5)
INR BLD: 1.27 RATIO — HIGH (ref 0.88–1.16)
MAGNESIUM SERPL-MCNC: 2.5 MG/DL — SIGNIFICANT CHANGE UP (ref 1.6–2.6)
MCHC RBC-ENTMCNC: 26.9 PG — LOW (ref 27–34)
MCHC RBC-ENTMCNC: 27 PG — SIGNIFICANT CHANGE UP (ref 27–34)
MCHC RBC-ENTMCNC: 31.1 GM/DL — LOW (ref 32–36)
MCHC RBC-ENTMCNC: 31.3 GM/DL — LOW (ref 32–36)
MCV RBC AUTO: 86.1 FL — SIGNIFICANT CHANGE UP (ref 80–100)
MCV RBC AUTO: 86.7 FL — SIGNIFICANT CHANGE UP (ref 80–100)
NRBC # BLD: 0 /100 WBCS — SIGNIFICANT CHANGE UP (ref 0–0)
NRBC # BLD: 0 /100 WBCS — SIGNIFICANT CHANGE UP (ref 0–0)
PLATELET # BLD AUTO: 292 K/UL — SIGNIFICANT CHANGE UP (ref 150–400)
PLATELET # BLD AUTO: 304 K/UL — SIGNIFICANT CHANGE UP (ref 150–400)
POTASSIUM SERPL-MCNC: 4.5 MMOL/L — SIGNIFICANT CHANGE UP (ref 3.5–5.3)
POTASSIUM SERPL-SCNC: 4.5 MMOL/L — SIGNIFICANT CHANGE UP (ref 3.5–5.3)
PROCALCITONIN SERPL-MCNC: 3.34 NG/ML — HIGH (ref 0.02–0.1)
PROCALCITONIN SERPL-MCNC: 4.76 NG/ML — HIGH (ref 0.02–0.1)
PROT SERPL-MCNC: 7.1 G/DL — SIGNIFICANT CHANGE UP (ref 6–8.3)
PROTHROM AB SERPL-ACNC: 14.6 SEC — HIGH (ref 10.5–13.4)
RBC # BLD: 3.08 M/UL — LOW (ref 3.8–5.2)
RBC # BLD: 3.3 M/UL — LOW (ref 3.8–5.2)
RBC # FLD: 15.4 % — HIGH (ref 10.3–14.5)
RBC # FLD: 15.6 % — HIGH (ref 10.3–14.5)
SODIUM SERPL-SCNC: 137 MMOL/L — SIGNIFICANT CHANGE UP (ref 135–145)
SPECIMEN SOURCE: SIGNIFICANT CHANGE UP
WBC # BLD: 14.5 K/UL — HIGH (ref 3.8–10.5)
WBC # BLD: 9.88 K/UL — SIGNIFICANT CHANGE UP (ref 3.8–10.5)
WBC # FLD AUTO: 14.5 K/UL — HIGH (ref 3.8–10.5)
WBC # FLD AUTO: 9.88 K/UL — SIGNIFICANT CHANGE UP (ref 3.8–10.5)

## 2022-08-20 PROCEDURE — 74176 CT ABD & PELVIS W/O CONTRAST: CPT | Mod: 26

## 2022-08-20 PROCEDURE — 99233 SBSQ HOSP IP/OBS HIGH 50: CPT

## 2022-08-20 RX ORDER — FENTANYL CITRATE 50 UG/ML
25 INJECTION INTRAVENOUS EVERY 4 HOURS
Refills: 0 | Status: DISCONTINUED | OUTPATIENT
Start: 2022-08-20 | End: 2022-08-22

## 2022-08-20 RX ORDER — FENTANYL CITRATE 50 UG/ML
50 INJECTION INTRAVENOUS ONCE
Refills: 0 | Status: DISCONTINUED | OUTPATIENT
Start: 2022-08-20 | End: 2022-08-20

## 2022-08-20 RX ADMIN — PANTOPRAZOLE SODIUM 40 MILLIGRAM(S): 20 TABLET, DELAYED RELEASE ORAL at 11:08

## 2022-08-20 RX ADMIN — Medication 2 MILLIGRAM(S): at 17:45

## 2022-08-20 RX ADMIN — Medication 3 MILLILITER(S): at 00:41

## 2022-08-20 RX ADMIN — Medication 1 APPLICATION(S): at 11:08

## 2022-08-20 RX ADMIN — Medication 650 MILLIGRAM(S): at 11:39

## 2022-08-20 RX ADMIN — Medication 2 TABLET(S): at 17:34

## 2022-08-20 RX ADMIN — Medication 2: at 17:31

## 2022-08-20 RX ADMIN — Medication 3 MILLILITER(S): at 20:38

## 2022-08-20 RX ADMIN — Medication 1 TABLET(S): at 11:10

## 2022-08-20 RX ADMIN — FENTANYL CITRATE 50 MICROGRAM(S): 50 INJECTION INTRAVENOUS at 03:57

## 2022-08-20 RX ADMIN — NYSTATIN CREAM 1 APPLICATION(S): 100000 CREAM TOPICAL at 14:25

## 2022-08-20 RX ADMIN — Medication 6: at 05:48

## 2022-08-20 RX ADMIN — Medication 3 MILLILITER(S): at 14:39

## 2022-08-20 RX ADMIN — INSULIN GLARGINE 8 UNIT(S): 100 INJECTION, SOLUTION SUBCUTANEOUS at 08:29

## 2022-08-20 RX ADMIN — FENTANYL CITRATE 25 MICROGRAM(S): 50 INJECTION INTRAVENOUS at 12:50

## 2022-08-20 RX ADMIN — POLYETHYLENE GLYCOL 3350 17 GRAM(S): 17 POWDER, FOR SOLUTION ORAL at 05:47

## 2022-08-20 RX ADMIN — CHLORHEXIDINE GLUCONATE 15 MILLILITER(S): 213 SOLUTION TOPICAL at 05:49

## 2022-08-20 RX ADMIN — Medication 2 MILLIGRAM(S): at 05:47

## 2022-08-20 RX ADMIN — Medication 2: at 23:37

## 2022-08-20 RX ADMIN — SIMETHICONE 80 MILLIGRAM(S): 80 TABLET, CHEWABLE ORAL at 23:37

## 2022-08-20 RX ADMIN — SIMETHICONE 80 MILLIGRAM(S): 80 TABLET, CHEWABLE ORAL at 11:09

## 2022-08-20 RX ADMIN — Medication 2 MILLIGRAM(S): at 21:57

## 2022-08-20 RX ADMIN — Medication 2 MILLIGRAM(S): at 01:23

## 2022-08-20 RX ADMIN — SENNA PLUS 3 TABLET(S): 8.6 TABLET ORAL at 21:57

## 2022-08-20 RX ADMIN — SODIUM CHLORIDE 4 MILLILITER(S): 9 INJECTION INTRAMUSCULAR; INTRAVENOUS; SUBCUTANEOUS at 20:38

## 2022-08-20 RX ADMIN — PIPERACILLIN AND TAZOBACTAM 25 GRAM(S): 4; .5 INJECTION, POWDER, LYOPHILIZED, FOR SOLUTION INTRAVENOUS at 23:37

## 2022-08-20 RX ADMIN — Medication 1 APPLICATION(S): at 11:22

## 2022-08-20 RX ADMIN — CHLORHEXIDINE GLUCONATE 15 MILLILITER(S): 213 SOLUTION TOPICAL at 17:33

## 2022-08-20 RX ADMIN — PIPERACILLIN AND TAZOBACTAM 25 GRAM(S): 4; .5 INJECTION, POWDER, LYOPHILIZED, FOR SOLUTION INTRAVENOUS at 15:21

## 2022-08-20 RX ADMIN — NYSTATIN CREAM 1 APPLICATION(S): 100000 CREAM TOPICAL at 08:28

## 2022-08-20 RX ADMIN — METHOCARBAMOL 500 MILLIGRAM(S): 500 TABLET, FILM COATED ORAL at 17:34

## 2022-08-20 RX ADMIN — HEPARIN SODIUM 5000 UNIT(S): 5000 INJECTION INTRAVENOUS; SUBCUTANEOUS at 05:46

## 2022-08-20 RX ADMIN — Medication 2 MILLIGRAM(S): at 14:25

## 2022-08-20 RX ADMIN — FENTANYL CITRATE 25 MICROGRAM(S): 50 INJECTION INTRAVENOUS at 20:51

## 2022-08-20 RX ADMIN — METHOCARBAMOL 500 MILLIGRAM(S): 500 TABLET, FILM COATED ORAL at 05:47

## 2022-08-20 RX ADMIN — Medication 650 MILLIGRAM(S): at 11:09

## 2022-08-20 RX ADMIN — SODIUM CHLORIDE 4 MILLILITER(S): 9 INJECTION INTRAMUSCULAR; INTRAVENOUS; SUBCUTANEOUS at 07:44

## 2022-08-20 RX ADMIN — HEPARIN SODIUM 5000 UNIT(S): 5000 INJECTION INTRAVENOUS; SUBCUTANEOUS at 17:32

## 2022-08-20 RX ADMIN — FENTANYL CITRATE 25 MICROGRAM(S): 50 INJECTION INTRAVENOUS at 20:36

## 2022-08-20 RX ADMIN — SIMETHICONE 80 MILLIGRAM(S): 80 TABLET, CHEWABLE ORAL at 17:31

## 2022-08-20 RX ADMIN — Medication 2 TABLET(S): at 05:47

## 2022-08-20 RX ADMIN — FENTANYL CITRATE 25 MICROGRAM(S): 50 INJECTION INTRAVENOUS at 12:35

## 2022-08-20 RX ADMIN — Medication 1 MILLIGRAM(S): at 11:10

## 2022-08-20 RX ADMIN — PIPERACILLIN AND TAZOBACTAM 25 GRAM(S): 4; .5 INJECTION, POWDER, LYOPHILIZED, FOR SOLUTION INTRAVENOUS at 01:22

## 2022-08-20 RX ADMIN — Medication 3 MILLILITER(S): at 07:44

## 2022-08-20 RX ADMIN — POLYETHYLENE GLYCOL 3350 17 GRAM(S): 17 POWDER, FOR SOLUTION ORAL at 17:36

## 2022-08-20 RX ADMIN — FENTANYL CITRATE 50 MICROGRAM(S): 50 INJECTION INTRAVENOUS at 04:15

## 2022-08-20 RX ADMIN — Medication 2 MILLIGRAM(S): at 10:15

## 2022-08-20 RX ADMIN — NYSTATIN CREAM 1 APPLICATION(S): 100000 CREAM TOPICAL at 21:57

## 2022-08-20 RX ADMIN — SIMETHICONE 80 MILLIGRAM(S): 80 TABLET, CHEWABLE ORAL at 05:47

## 2022-08-20 RX ADMIN — Medication 1 TABLET(S): at 11:08

## 2022-08-20 RX ADMIN — Medication 4: at 11:22

## 2022-08-20 RX ADMIN — PIPERACILLIN AND TAZOBACTAM 25 GRAM(S): 4; .5 INJECTION, POWDER, LYOPHILIZED, FOR SOLUTION INTRAVENOUS at 10:15

## 2022-08-20 NOTE — PROGRESS NOTE ADULT - ASSESSMENT
REVIEW OF SYMPTOMS      Able to give (reliable) ROS  NO     PHYSICAL EXAM    HEENT Unremarkable  atraumatic   RESP Fair air entry EXP prolonged    Harsh breath sound Resp distres mild   CARDIAC S1 S2 No S3     NO JVD    ABDOMEN SOFT BS PRESENT NOT DISTENDED No hepatosplenomegaly   PEDAL EDEMA present No calf tenderness  NO rash       AGE/SEX.   76 f  DOA.  8/18/2022  CC .  8/18/2022 respiratory failure, chronic trach vent, full code, recent Pneumonia/pleural effusion  shortness of breath    PROCEDURE  8/19/2022 [facility]     HOSPITAL COURSE.   WOUND CARE 8/18/2022   PNEUMONIA 8/18/2022 8/19 Tmax 101  LACTICEMIA 8/18/2022  VENT DEPENDENT  RESP FAILURE 8/18/2022    ANEMIA 8/18-8/19/2022 Hb 9.8 - 7    HYPERKALEMIA 8/18/2022   RYAN 8/18/2022   DM     PMH .  pmh Pneumonia    pmh 5/2/2022 SERRATIA CARBAPENEM RESISTANT PSEUDOMONAS   pmh HTN,   pmh DM,   pmh CVA,   pmh  chronic respiratory failure   pmh s/p trach, PEG,   pmh cardiac arrest  pmh Pl effsn  r PL EFFSN   6/8/2022 r thorac g 161 l 222 p 4.9 p 10 l 16 .38    l pl effsn  5/25/2022 g 183 l 144/381 .37 p 3.4/5.3 .64 p 23 l 36   5/25/2022l pl fluid  lymph pred exudate   cyto (-)             GENERAL DATA .   GOC.  8/18/2022 full code      ALLGY.   codeine                          WT.     8/18/2022 63                               BMI.     8/18/2022 23                         ICU STAY. 8/18/2022  COVID. 8/18/2022 scv2 (-)       BEST PRACTICE ISSUES.    HOB ELEVATN. Yes  DVT PPLX.  8/18/2022 hpsc     SQUIRES PPLX.  8/18/2022 protonix 40     INFN PPLX. 8/18/2022 chlorhexidine .12%     8/19/2022 chlorhexidine 4%   SP SW EM.        DIET. 8/18/2022 gflucerna 1200 gt                      VS/ PO/IO/ VENT/ DRIPS.   8/20/2022 99f 83 100/60  8/20/2022 Tmax 101 on 8/19 8/20/2022 16/400/5/.3      ASSESSMENT/RECOMMENDATIONS .   RESP.  VENT MANAGEMENT.  HOB elevation  Target Pplat 30 (-)  Target PO 90-95%  Target pH 730 (+)  Daily spontaneous breathing trials   Daily sedation vacation     GAS EXCHANGE.  vbg 8/18/2022 vbg pH 737   8/18/2022 8p   vent 100% 16/400 748/34/168     SEDATION.  8/20/2022 fentanyl 25.6 p    COPD.   8/18/2022 duoneb   8/19 nacl 7% bid     INFECTION.  WOUND CARE  8/18/2022 povidone l and r great toe   8/19/2022 collagenase buttocks     PNEUMONIA .  w 8/18-8/19-8/20/2022 w 25 - 11 - 14   ua 8/18/2022 w 3-5   pr 8/20/2022 pr 3.3   ct ch 8/18   persistent mod bl effs   underlying dependent airspace consolidation   septal thickening and patchy ground glass opacities maddie r lung   ctap nc 8/20/2022 (-)   rvp 8/18/2022 (-)  bc 8/18/2022 (-)   mrsa   8/19/2022  zosyn Se Vignesh  8/19/2022 bactrim ds 2 bid   a/r.    PAST MICROBIO.   5/2/2022 SERRATIA CARBAPENEM RESISTANT PSEUDOMONAS     PLEURAL EFFSN.  ct ch 8/18   persistent mod bl effs     CARDIAC.  LACTICEMIA.  la 8/18-8/19/2022 la 3.7- 1.2     CHF.  bnp 8/19/2022 bnp 14822    echo 8/19/2022 n lvsf dd1 pasp 58   8/19/2022 lasix 40 one dose     GI.  HEMAT.  ANEMIA.   Hb 8/18-8/19-8/20/2022 Hb 9.8 - 7-8.9     8/19/2022 7:21 PM recheck Hb   target Hb 7 (+)       TRANSFUSION   8/19 1 u prbc    RENAL.  RYAN.  Cr 8/18-8/19- 8/20/2022 Cr 1.2- 1.1 - 1.3   monitor    IV fl.  none     ENDOCRINE.   DM.  8/18/2022 Insulin     NEURO.  8/19/2022 lorazepam 2.6   8/19/2022 methocarbamol 500.2       TIME SPENT   Over 39 minutes aggregate critical  care time spent on encounter; activities included   direct patient care, counseling and/or coordinating care reviewing notes, lab data/ imaging , discussion with multidisciplinary team/ patient  /family and explaining in detail risks, benefits, alternatives  of the recommendations     CHAPINCITO GUIDRY 76 f [facility] S 8/18/2022   DR JOSEPH DU

## 2022-08-20 NOTE — PROGRESS NOTE ADULT - ASSESSMENT
76F with dementia, COPD with chronic resp failure and is vent dependent, s/p PEG, hx of cardiac arrest, CHF, cor pulmonale, CVA, COVID-19 infxn, CKD, anemia, multiple admissions for respiratory distress (last admission from 6/1-6/9 diagnosed with PNA with parapneumonic pleural effusion s/p thoracentesis and Avycaz) who presents from [facility] for respiratory distress    ID eval noted  emp ABX in progress  cardio eval noted  vent support  GOC documented   is the HCP  pt is full code  old records reviewed  [facility] supportive care in progress  Fentanyl PRN on order for pain - sedation management

## 2022-08-20 NOTE — PROGRESS NOTE ADULT - ASSESSMENT
The patient is a 76 year old female with a history of HTN, DM, CVA, dementia, chronic respiratory failure s/p trach, PEG, cardiac arrest, anemia, pleural effusions who presents with respiratory distress.    8/20/22  Seen at [facility] ICU  Lying flat, sleeping comfortably  Intubated    Plan:  - Echo 5/30/22 with normal LV systolic function, mod pulm HTN  - Repeat echo similar. IVC small/collapsible, especially in a patient receiving positive pressure ventilation suggestive of hypovolemia.  - CT chest with moderate bilateral pleural effusions and upper lobe PNA  - Pleural effusions previously were exudative, likely parapneumonic  - BNP-12,604  - Received furosemide 40 mg IV; hold off on additional IV diuretics for now - can resume furosemide 20 mg PO daily PRN  - On Zosyn, Bactrim  - Being followed by ID, Pulmonary, Palliative care

## 2022-08-20 NOTE — PROGRESS NOTE ADULT - ASSESSMENT
75yo F with PMH of dementia, COPD with chronic resp failure and is vent dependent, s/p PEG, hx of cardiac arrest, diastolic CHF, cor pulmonale, hx of CVA, COVID-19 infxn, CKD, anemia, multiple admissions for respiratory distress (recent admission from 6/1-6/9 diagnosed with PNA with parapneumonic pleural effusion s/p thoracentesis and Avycaz) who presents from [facility] for respiratory distress again a/w sepsis and respiratory failure due to suspected recurrent PNA     Severe sepsis and acute hypoxic respiratory failure 2/2 gram-negative PNA   - continue current vent settings (on AC with RR 16, , PEEP 5, FiO2 100%) maintain O2 sat >92%  - was on ertapenem, as per ID changed to zosyn+bactrim for coverage of prior admissions cultures of MDR Serratia and CRE Pseudomonas  - lactate downtrending, procalcitonin high (4.76 -> 3.34)  - cautious use of fluids given hx of CHF (received 1750cc of NS in ED)  - f/u blood cultures (NGTD), urine culture pending  - f/u trach sputum culture - has some GNR growing awaiting speciation   - has hx of respiratory distress driven by vent asynchrony and would require IV benzos  - cc/pulm consult (Jaime), recs appreciated  - ID ([facility]), recs appreciated  - palliative care (Emre), recs appreciated - pt is full code    Diastolic CHF  B/L pleural effusions  - likely is a component of pulmonary edema given hx of CHF, given lasix 40mg IV x1 post-transfusion  - last TTE was 5/30 with EF 65% with normal LVSF, dilated RV, and moderate pHTN -> repeat TTE appears unchanged  - cardiology consult (Deepak), recs appreciated   - may need repeat thoracentesis (had 1.5L drained two admissions ago), pulmonology consulted; was parapneumonic on that admission as opposed to transudative and thus less likely related to CHF    Anemia of chronic disease  - received 1 units of pRBC on 8/19 for Hgb of 7.0 with appropriate response in Hgb -- now in the 8s  - has been evaluated by GI and hematology in the past -> dx with ACD with intermittent GI bleeding  - negative occult blood   - monitor H&H    Hyperkalemia  - given both insulin and lasix   - resolved; monitor BMP    Hx of CKD stage 3 - near baseline of 1.2-1.3  - renally dose medications and monitor BMP and electrolytes     HTN  - not on any BP meds at facility  - monitor VS    DM2  - NPO with TF  - continue with insulin 8units qAM as per last admission  - start moderate insulin coverage scale  - maintain FS <180    Diet  - continue with same TF from last admission    Preventive measures  - cont with heparin subq for DVT ppx  - Full Code

## 2022-08-20 NOTE — PROGRESS NOTE ADULT - SUBJECTIVE AND OBJECTIVE BOX
Patient is a 76y old  Female who presents with a chief complaint of respiratory distress.      INTERVAL HPI/OVERNIGHT EVENTS: Pt with trach/vent / nonverbal, cannot provide ROS. Pt continued on Bactrim / zosyn to cover bacteria growing on last admission.       MEDICATIONS  (STANDING):  albuterol/ipratropium for Nebulization 3 milliLiter(s) Nebulizer every 6 hours  chlorhexidine 0.12% Liquid 15 milliLiter(s) Oral Mucosa every 12 hours  collagenase Ointment 1 Application(s) Topical daily  dextrose 5%. 1000 milliLiter(s) (100 mL/Hr) IV Continuous <Continuous>  dextrose 5%. 1000 milliLiter(s) (50 mL/Hr) IV Continuous <Continuous>  dextrose 50% Injectable 25 Gram(s) IV Push once  dextrose 50% Injectable 12.5 Gram(s) IV Push once  dextrose 50% Injectable 25 Gram(s) IV Push once  folic acid 1 milliGRAM(s) Oral daily  glucagon  Injectable 1 milliGRAM(s) IntraMuscular once  heparin   Injectable 5000 Unit(s) SubCutaneous every 12 hours  insulin glargine Injectable (LANTUS) 8 Unit(s) SubCutaneous every morning  insulin lispro (ADMELOG) corrective regimen sliding scale   SubCutaneous every 6 hours  lactobacillus acidophilus 1 Tablet(s) Oral daily  LORazepam     Tablet 2 milliGRAM(s) Oral every 4 hours  methocarbamol 500 milliGRAM(s) Oral two times a day  multivitamin 1 Tablet(s) Oral daily  nystatin Powder 1 Application(s) Topical three times a day  pantoprazole  Injectable 40 milliGRAM(s) IV Push daily  piperacillin/tazobactam IVPB.. 3.375 Gram(s) IV Intermittent every 8 hours  polyethylene glycol 3350 17 Gram(s) Oral every 12 hours  povidone iodine 10% Solution 1 Application(s) Topical daily  senna 3 Tablet(s) Oral at bedtime  simethicone 80 milliGRAM(s) Chew every 6 hours  sodium chloride 7% Inhalation 4 milliLiter(s) Inhalation every 12 hours  trimethoprim  160 mG/sulfamethoxazole 800 mG 2 Tablet(s) Oral two times a day    MEDICATIONS  (PRN):  acetaminophen     Tablet .. 650 milliGRAM(s) Oral every 6 hours PRN Temp greater or equal to 38C (100.4F), Mild Pain (1 - 3)  aluminum hydroxide/magnesium hydroxide/simethicone Suspension 30 milliLiter(s) Oral every 4 hours PRN Dyspepsia  dextrose Oral Gel 15 Gram(s) Oral once PRN Blood Glucose LESS THAN 70 milliGRAM(s)/deciliter  fentaNYL    Injectable 25 MICROGram(s) IV Push every 4 hours PRN Moderate Pain (4 - 6)  melatonin 3 milliGRAM(s) Oral at bedtime PRN Insomnia  ondansetron Injectable 4 milliGRAM(s) IV Push every 8 hours PRN Nausea and/or Vomiting  sodium chloride 0.9% lock flush 10 milliLiter(s) IV Push every 1 hour PRN Pre/post blood products, medications, blood draw, and to maintain line patency      Allergies    codeine (Hives)    Intolerances        REVIEW OF SYSTEMS:  Pt with trach/vent / nonverbal, cannot provide ROS.     Vital Signs Last 24 Hrs  T(C): 37.4 (20 Aug 2022 16:26), Max: 37.9 (19 Aug 2022 20:00)  T(F): 99.3 (20 Aug 2022 16:26), Max: 100.2 (19 Aug 2022 20:00)  HR: 84 (20 Aug 2022 18:00) (81 - 102)  BP: 109/50 (20 Aug 2022 18:00) (75/62 - 127/69)  BP(mean): 68 (20 Aug 2022 18:00) (64 - 87)  RR: 18 (20 Aug 2022 18:00) (14 - 35)  SpO2: 96% (20 Aug 2022 18:00) (95% - 100%)    Parameters below as of 20 Aug 2022 18:00  Patient On (Oxygen Delivery Method): ventilator    O2 Concentration (%): 30    PHYSICAL EXAM:  GENERAL: chronically ill-appearing  HEENT:  anicteric, dry mucous membranes ; trach/vent  CHEST/LUNG:  decreased breath sounds b/l   HEART:  RRR, S1, S2  ABDOMEN:  BS+, soft, no apparent tenderness, somewhat distended; G-tube in place  EXTREMITIES: no cyanosis or apparent calf tenderness  NERVOUS SYSTEM: nonverbal    LABS:                        8.9    14.50 )-----------( 304      ( 20 Aug 2022 06:19 )             28.4     CBC Full  -  ( 20 Aug 2022 06:19 )  WBC Count : 14.50 K/uL  Hemoglobin : 8.9 g/dL  Hematocrit : 28.4 %  Platelet Count - Automated : 304 K/uL  Mean Cell Volume : 86.1 fl  Mean Cell Hemoglobin : 27.0 pg  Mean Cell Hemoglobin Concentration : 31.3 gm/dL  Auto Neutrophil # : x  Auto Lymphocyte # : x  Auto Monocyte # : x  Auto Eosinophil # : x  Auto Basophil # : x  Auto Neutrophil % : x  Auto Lymphocyte % : x  Auto Monocyte % : x  Auto Eosinophil % : x  Auto Basophil % : x    20 Aug 2022 06:19    137    |  102    |  43     ----------------------------<  244    4.5     |  30     |  1.31     Ca    8.6        20 Aug 2022 06:19  Mg     2.5       20 Aug 2022 06:19    TPro  7.1    /  Alb  1.9    /  TBili  0.6    /  DBili  x      /  AST  11     /  ALT  11     /  AlkPhos  104    20 Aug 2022 06:19    PT/INR - ( 20 Aug 2022 06:19 )   PT: 14.6 sec;   INR: 1.27 ratio           Urinalysis Basic - ( 18 Aug 2022 20:00 )    Color: Yellow / Appearance: Clear / S.015 / pH: x  Gluc: x / Ketone: Trace  / Bili: Negative / Urobili: Negative mg/dL   Blood: x / Protein: 100 mg/dL / Nitrite: Negative   Leuk Esterase: Trace / RBC: 3-5 /HPF / WBC 3-5   Sq Epi: x / Non Sq Epi: Moderate / Bacteria: Few      CAPILLARY BLOOD GLUCOSE      POCT Blood Glucose.: 180 mg/dL (20 Aug 2022 17:28)  POCT Blood Glucose.: 234 mg/dL (20 Aug 2022 11:07)  POCT Blood Glucose.: 261 mg/dL (20 Aug 2022 05:45)  POCT Blood Glucose.: 187 mg/dL (19 Aug 2022 23:27)        Culture - Sputum (collected 22 @ 20:45)  Source: ET Tube ET Tube  Gram Stain (22 @ 03:35):    Few polymorphonuclear leukocytes per low power field    Rare Squamous epithelial cells per low power field    Few Gram Negative Rods per oil power field  Preliminary Report (22 @ 19:12):    Normal Respiratory Elvira present    Culture - Urine (collected 22 @ 20:00)  Source: Clean Catch Clean Catch (Midstream)  Preliminary Report (22 @ 12:13):    Culture in progress    Culture - Blood (collected 22 @ 19:14)  Source: .Blood Blood-Peripheral  Preliminary Report (22 @ 01:02):    No growth to date.    Culture - Blood (collected 22 @ 18:45)  Source: .Blood Blood-Peripheral  Preliminary Report (22 @ 01:02):    No growth to date.        RADIOLOGY & ADDITIONAL TESTS:    Personally reviewed.     Consultant(s) Notes Reviewed:  [x] YES  [ ] NO

## 2022-08-20 NOTE — PROGRESS NOTE ADULT - SUBJECTIVE AND OBJECTIVE BOX
Patient is a 76y Female with a known history of :    HPI:  ***Patient with dementia and is non-verbal and is chronically trach/vented.  Patient is from facility and therefore information is obtained from chart.***    76F with dementia, COPD with chronic resp failure and is vent dependent, s/p PEG, hx of cardiac arrest, CHF, cor pulmonale, CVA, COVID-19 infxn, CKD, anemia, multiple admissions for respiratory distress (last admission from 6/1-6/9 diagnosed with PNA with parapneumonic pleural effusion s/p thoracentesis and Avycaz) who presents from [facility] for respiratory distress again.  [facility] transfer note only mention respiratory distress.  As per ED note, patient was noted to have increased dyspnea and worsening hypoxia.  No noted pain or reported fevers/chills.  In the ED, patient's triage vitals were /72  , RR 24, 100% on vent, T 99.7F.  Labs showed leukocytosis 25.06 (up from 6.85 on 6/10), anemia 9.8 (from 11.3), thrombocytosis 466 (from 264), hyperkalemia 5.7, lactic acid 3.7.  ABG was 7.48/34/168.  CT chest pending.  CXR showed possible consolidation on right lobe.  Patient started empirically on vancomycin and zosyn by ED.  Patient is being admitted for respiratory distress 2/2 PNA.  Currently patient is non-verbal and does not seem to be in any distress.   (18 Aug 2022 20:18)      REVIEW OF SYSTEMS:    CONSTITUTIONAL: No fever, weight loss, or fatigue  EYES: No eye pain, visual disturbances, or discharge  ENMT:  No difficulty hearing, tinnitus, vertigo; No sinus or throat pain  NECK: No pain or stiffness  BREASTS: No pain, masses, or nipple discharge  RESPIRATORY: No cough, wheezing, chills or hemoptysis; No shortness of breath  CARDIOVASCULAR: No chest pain, palpitations, dizziness, or leg swelling  GASTROINTESTINAL: No abdominal or epigastric pain. No nausea, vomiting, or hematemesis; No diarrhea or constipation. No melena or hematochezia.  GENITOURINARY: No dysuria, frequency, hematuria, or incontinence  NEUROLOGICAL: No headaches, memory loss, loss of strength, numbness, or tremors  SKIN: No itching, burning, rashes, or lesions   LYMPH NODES: No enlarged glands  ENDOCRINE: No heat or cold intolerance; No hair loss  MUSCULOSKELETAL: No joint pain or swelling; No muscle, back, or extremity pain  PSYCHIATRIC: No depression, anxiety, mood swings, or difficulty sleeping  HEME/LYMPH: No easy bruising, or bleeding gums  ALLERGY AND IMMUNOLOGIC: No hives or eczema    MEDICATIONS  (STANDING):  albuterol/ipratropium for Nebulization 3 milliLiter(s) Nebulizer every 6 hours  chlorhexidine 0.12% Liquid 15 milliLiter(s) Oral Mucosa every 12 hours  collagenase Ointment 1 Application(s) Topical daily  dextrose 5%. 1000 milliLiter(s) (100 mL/Hr) IV Continuous <Continuous>  dextrose 5%. 1000 milliLiter(s) (50 mL/Hr) IV Continuous <Continuous>  dextrose 50% Injectable 25 Gram(s) IV Push once  dextrose 50% Injectable 12.5 Gram(s) IV Push once  dextrose 50% Injectable 25 Gram(s) IV Push once  folic acid 1 milliGRAM(s) Oral daily  glucagon  Injectable 1 milliGRAM(s) IntraMuscular once  heparin   Injectable 5000 Unit(s) SubCutaneous every 12 hours  insulin glargine Injectable (LANTUS) 8 Unit(s) SubCutaneous every morning  insulin lispro (ADMELOG) corrective regimen sliding scale   SubCutaneous every 6 hours  lactobacillus acidophilus 1 Tablet(s) Oral daily  LORazepam     Tablet 2 milliGRAM(s) Oral every 4 hours  methocarbamol 500 milliGRAM(s) Oral two times a day  multivitamin 1 Tablet(s) Oral daily  nystatin Powder 1 Application(s) Topical three times a day  pantoprazole  Injectable 40 milliGRAM(s) IV Push daily  piperacillin/tazobactam IVPB.. 3.375 Gram(s) IV Intermittent every 8 hours  polyethylene glycol 3350 17 Gram(s) Oral every 12 hours  povidone iodine 10% Solution 1 Application(s) Topical daily  senna 3 Tablet(s) Oral at bedtime  simethicone 80 milliGRAM(s) Chew every 6 hours  sodium chloride 7% Inhalation 4 milliLiter(s) Inhalation every 12 hours  trimethoprim  160 mG/sulfamethoxazole 800 mG 2 Tablet(s) Oral two times a day    MEDICATIONS  (PRN):  acetaminophen     Tablet .. 650 milliGRAM(s) Oral every 6 hours PRN Temp greater or equal to 38C (100.4F), Mild Pain (1 - 3)  aluminum hydroxide/magnesium hydroxide/simethicone Suspension 30 milliLiter(s) Oral every 4 hours PRN Dyspepsia  dextrose Oral Gel 15 Gram(s) Oral once PRN Blood Glucose LESS THAN 70 milliGRAM(s)/deciliter  fentaNYL    Injectable 25 MICROGram(s) IV Push every 4 hours PRN Moderate Pain (4 - 6)  melatonin 3 milliGRAM(s) Oral at bedtime PRN Insomnia  ondansetron Injectable 4 milliGRAM(s) IV Push every 8 hours PRN Nausea and/or Vomiting  sodium chloride 0.9% lock flush 10 milliLiter(s) IV Push every 1 hour PRN Pre/post blood products, medications, blood draw, and to maintain line patency      ALLERGIES: codeine (Hives)      FAMILY HISTORY:  No pertinent family history in first degree relatives        Social history:  Alochol:   Smoking:   Drug Use:   Marital Status:     PHYSICAL EXAMINATION:  -----------------------------  T(C): 37.3 (08-20-22 @ 08:44), Max: 38.8 (08-19-22 @ 18:45)  HR: 97 (08-20-22 @ 12:00) (81 - 102)  BP: 103/60 (08-20-22 @ 12:00) (91/50 - 127/69)  RR: 35 (08-20-22 @ 12:00) (14 - 38)  SpO2: 96% (08-20-22 @ 12:00) (95% - 100%)  Wt(kg): --    08-19 @ 07:01  -  08-20 @ 07:00  --------------------------------------------------------  IN:    Free Water: 400 mL    IV PiggyBack: 100 mL    Jevity 1.5: 1125 mL    PRBCs (Packed Red Blood Cells): 336 mL  Total IN: 1961 mL    OUT:    Indwelling Catheter - Urethral (mL): 1075 mL    Voided (mL): 1100 mL  Total OUT: 2175 mL    Total NET: -214 mL      08-20 @ 07:01  -  08-20 @ 13:04  --------------------------------------------------------  IN:    IV PiggyBack: 100 mL    Jevity 1.5: 135 mL  Total IN: 235 mL    OUT:    Indwelling Catheter - Urethral (mL): 185 mL  Total OUT: 185 mL    Total NET: 50 mL            Constitutional: well developed, normal appearance, well groomed, well nourished, no deformities and no acute distress.   Eyes: the conjunctiva exhibited no abnormalities and the eyelids demonstrated no xanthelasmas.   HEENT: normal oral mucosa, no oral pallor and no oral cyanosis.   Neck: normal jugular venous A waves present, normal jugular venous V waves present and no jugular venous obregon A waves.   Pulmonary: no respiratory distress, normal respiratory rhythm and effort, no accessory muscle use and lungs were clear to auscultation bilaterally. Anteriorly  Cardiovascular: heart rate and rhythm were normal, normal S1 and S2 and no murmur, gallop, rub, heave or thrill are present.   Musculoskeletal: the gait could not be assessed.  Extremities: no clubbing of the fingernails, no localized cyanosis, no petechial hemorrhages and no ischemic changes.   Skin: normal skin color and pigmentation, no rash, no venous stasis, no skin lesions, no skin ulcer and no xanthoma was observed.       LABS:   --------  08-20    137  |  102  |  43<H>  ----------------------------<  244<H>  4.5   |  30  |  1.31<H>    Ca    8.6      20 Aug 2022 06:19  Phos  3.1     08-19  Mg     2.5     08-20    TPro  7.1  /  Alb  1.9<L>  /  TBili  0.6  /  DBili  x   /  AST  11  /  ALT  11  /  AlkPhos  104  08-20                         8.9    14.50 )-----------( 304      ( 20 Aug 2022 06:19 )             28.4     PT/INR - ( 20 Aug 2022 06:19 )   PT: 14.6 sec;   INR: 1.27 ratio         PTT - ( 18 Aug 2022 18:45 )  PTT:45.9 sec        Culture Results:   Culture in progress (08-18 @ 20:00)  Culture Results:   No growth to date. (08-18 @ 19:14)  Culture Results:   No growth to date. (08-18 @ 18:45)    08-19 @ 20:45    Organism --   Gram Stain Blood -- Gram Stain   Few polymorphonuclear leukocytes per low power field  Rare Squamous epithelial cells per low power field  Few Gram Negative Rods per oil power field  Specimen Source ET Tube ET Tube  Culture-Blood --    08-18 @ 20:00    Organism --   Gram Stain Blood -- Gram Stain --  Specimen Source Clean Catch Clean Catch (Midstream)  Culture-Blood --    08-18 @ 19:14    Organism --   Gram Stain Blood -- Gram Stain --  Specimen Source .Blood Blood-Peripheral  Culture-Blood --    08-18 @ 18:45    Organism --   Gram Stain Blood -- Gram Stain --  Specimen Source .Blood Blood-Peripheral  Culture-Blood --        Radiology:

## 2022-08-20 NOTE — PROGRESS NOTE ADULT - SUBJECTIVE AND OBJECTIVE BOX
Date/Time Patient Seen:  		  Referring MD:   Data Reviewed	       Patient is a 76y old  Female who presents with a chief complaint of respiratory distress (19 Aug 2022 20:04)      Subjective/HPI     PAST MEDICAL & SURGICAL HISTORY:  Dementia of frontal lobe type    Aphasic stroke    Diabetes mellitus    Respiratory failure    Hypertension    GERD (gastroesophageal reflux disease)    Constipation    Respiratory failure    CVA (cerebral vascular accident)    HTN (hypertension)    DM (diabetes mellitus)    Advanced dementia    COVID-19 virus detected    Quadriplegia    Pneumonia    Type II diabetes mellitus    Hx of appendectomy    Gastrostomy in place    Tracheostomy in place    Tracheostomy tube present    Feeding by G-tube          Medication list         MEDICATIONS  (STANDING):  albuterol/ipratropium for Nebulization 3 milliLiter(s) Nebulizer every 6 hours  chlorhexidine 0.12% Liquid 15 milliLiter(s) Oral Mucosa every 12 hours  collagenase Ointment 1 Application(s) Topical daily  dextrose 5%. 1000 milliLiter(s) (100 mL/Hr) IV Continuous <Continuous>  dextrose 5%. 1000 milliLiter(s) (50 mL/Hr) IV Continuous <Continuous>  dextrose 50% Injectable 25 Gram(s) IV Push once  dextrose 50% Injectable 12.5 Gram(s) IV Push once  dextrose 50% Injectable 25 Gram(s) IV Push once  folic acid 1 milliGRAM(s) Oral daily  glucagon  Injectable 1 milliGRAM(s) IntraMuscular once  heparin   Injectable 5000 Unit(s) SubCutaneous every 12 hours  insulin glargine Injectable (LANTUS) 8 Unit(s) SubCutaneous every morning  insulin lispro (ADMELOG) corrective regimen sliding scale   SubCutaneous every 6 hours  lactobacillus acidophilus 1 Tablet(s) Oral daily  LORazepam     Tablet 2 milliGRAM(s) Oral every 4 hours  methocarbamol 500 milliGRAM(s) Oral two times a day  multivitamin 1 Tablet(s) Oral daily  nystatin Powder 1 Application(s) Topical three times a day  pantoprazole  Injectable 40 milliGRAM(s) IV Push daily  piperacillin/tazobactam IVPB.. 3.375 Gram(s) IV Intermittent every 8 hours  polyethylene glycol 3350 17 Gram(s) Oral every 12 hours  povidone iodine 10% Solution 1 Application(s) Topical daily  senna 3 Tablet(s) Oral at bedtime  simethicone 80 milliGRAM(s) Chew every 6 hours  sodium chloride 7% Inhalation 4 milliLiter(s) Inhalation every 12 hours  trimethoprim  160 mG/sulfamethoxazole 800 mG 2 Tablet(s) Oral two times a day    MEDICATIONS  (PRN):  acetaminophen     Tablet .. 650 milliGRAM(s) Oral every 6 hours PRN Temp greater or equal to 38C (100.4F), Mild Pain (1 - 3)  aluminum hydroxide/magnesium hydroxide/simethicone Suspension 30 milliLiter(s) Oral every 4 hours PRN Dyspepsia  dextrose Oral Gel 15 Gram(s) Oral once PRN Blood Glucose LESS THAN 70 milliGRAM(s)/deciliter  fentaNYL    Injectable 25 MICROGram(s) IV Push every 4 hours PRN Moderate Pain (4 - 6)  melatonin 3 milliGRAM(s) Oral at bedtime PRN Insomnia  ondansetron Injectable 4 milliGRAM(s) IV Push every 8 hours PRN Nausea and/or Vomiting  sodium chloride 0.9% lock flush 10 milliLiter(s) IV Push every 1 hour PRN Pre/post blood products, medications, blood draw, and to maintain line patency         Vitals log        ICU Vital Signs Last 24 Hrs  T(C): 37.3 (20 Aug 2022 04:21), Max: 38.8 (19 Aug 2022 18:45)  T(F): 99.1 (20 Aug 2022 04:21), Max: 101.8 (19 Aug 2022 18:45)  HR: 99 (20 Aug 2022 06:58) (77 - 102)  BP: 112/63 (20 Aug 2022 06:58) (91/50 - 128/72)  BP(mean): 78 (20 Aug 2022 06:58) (64 - 88)  ABP: --  ABP(mean): --  RR: 21 (20 Aug 2022 06:58) (14 - 38)  SpO2: 98% (20 Aug 2022 06:58) (95% - 100%)    O2 Parameters below as of 20 Aug 2022 06:58  Patient On (Oxygen Delivery Method): ventilator    O2 Concentration (%): 40         Mode: AC/ CMV (Assist Control/ Continuous Mandatory Ventilation)  RR (machine): 16  TV (machine): 400  FiO2: 30  PEEP: 5  ITime: 1  MAP: 17  PIP: 32      Input and Output:  I&O's Detail    19 Aug 2022 07:01  -  20 Aug 2022 07:00  --------------------------------------------------------  IN:    Free Water: 400 mL    IV PiggyBack: 100 mL    Jevity 1.5: 1125 mL    PRBCs (Packed Red Blood Cells): 336 mL  Total IN: 1961 mL    OUT:    Indwelling Catheter - Urethral (mL): 1000 mL    Voided (mL): 1100 mL  Total OUT: 2100 mL    Total NET: -139 mL          Lab Data                        8.9    14.50 )-----------( 304      ( 20 Aug 2022 06:19 )             28.4     08-20    137  |  102  |  43<H>  ----------------------------<  244<H>  4.5   |  30  |  1.31<H>    Ca    8.6      20 Aug 2022 06:19  Phos  3.1     08-19  Mg     2.5     08-20    TPro  7.1  /  Alb  1.9<L>  /  TBili  0.6  /  DBili  x   /  AST  11  /  ALT  11  /  AlkPhos  104  08-20    ABG - ( 18 Aug 2022 20:06 )  pH, Arterial: 7.48  pH, Blood: x     /  pCO2: 34    /  pO2: 168   / HCO3: 25    / Base Excess: 1.8   /  SaO2: 98.8                    Review of Systems	      Objective     Physical Examination    heart s1s2  lung dec BS  abd soft      Pertinent Lab findings & Imaging      Annalise:  NO   Adequate UO     I&O's Detail    19 Aug 2022 07:01  -  20 Aug 2022 07:00  --------------------------------------------------------  IN:    Free Water: 400 mL    IV PiggyBack: 100 mL    Jevity 1.5: 1125 mL    PRBCs (Packed Red Blood Cells): 336 mL  Total IN: 1961 mL    OUT:    Indwelling Catheter - Urethral (mL): 1000 mL    Voided (mL): 1100 mL  Total OUT: 2100 mL    Total NET: -139 mL               Discussed with:     Cultures:	        Radiology

## 2022-08-20 NOTE — PROGRESS NOTE ADULT - SUBJECTIVE AND OBJECTIVE BOX
BREA BECKHAM     [facility]    Allergies    codeine (Hives)    Intolerances        PAST MEDICAL & SURGICAL HISTORY:  Dementia of frontal lobe type      Aphasic stroke      Diabetes mellitus      Respiratory failure      Hypertension      GERD (gastroesophageal reflux disease)      Constipation      Respiratory failure      CVA (cerebral vascular accident)      HTN (hypertension)      DM (diabetes mellitus)      Advanced dementia      COVID-19 virus detected      Quadriplegia      Pneumonia      Type II diabetes mellitus      Hx of appendectomy      Gastrostomy in place      Tracheostomy in place      Tracheostomy tube present      Feeding by G-tube          FAMILY HISTORY:  No pertinent family history in first degree relatives        Home Medications:  albuterol 90 mcg/inh inhalation aerosol with adapter: 2  inhaled every 6 hours (21 May 2022 21:16)  Bacid (LAC) oral tablet: 2 tab(s) by gastrostomy tube once a day (21 May 2022 21:16)  Betadine 10% topical swab: cleanse right and left great toes (21 May 2022 21:16)  Carafate 1 g/10 mL oral suspension: 10 milliliter(s) by gastrostomy tube 4 times a day (before meals and at bedtime) for 14 days (Started 21) (21 May 2022 21:16)  chlorhexidine 0.12% mucous membrane liquid: 15 milliliter(s) mucous membrane 2 times a day (21 May 2022 21:16)  ertapenem 1 g injection: 1 gram(s) injectable once a day until  (10 Zay 2022 12:12)  Eucerin topical cream: Apply topically to affected area once a day bilateral feet (21 May 2022 21:16)  folic acid 1 mg oral tablet: 1 tab(s) orally once a day (21 May 2022 21:16)  furosemide 20 mg oral tablet: 1 tab(s) orally once a day (10 Zay 2022 12:12)  insulin glargine 100 units/mL subcutaneous solution: 8 unit(s) subcutaneous once a day (in the morning) (2022 08:24)  ipratropium-albuterol 0.5 mg-2.5 mg/3 mL inhalation solution: 3 milliliter(s) inhaled 4 times a day (21 May 2022 21:16)  LORazepam 1 mg oral tablet: 1 tab(s) by gastrostomy tube every 4 hours (21 May 2022 21:16)  methocarbamol 500 mg oral tablet: 1 tab(s) by gastrostomy tube 2 times a day (21 May 2022 21:16)  MiraLax oral powder for reconstitution:  (21 May 2022 23:14)  Multiple Vitamins oral tablet: 1 tab(s) orally once a day (21 May 2022 21:16)  nystatin 100,000 units/g topical powder: 1 application topically 3 times a day (29 May 2022 16:35)  omeprazole 20 mg oral delayed release capsule: orally 2 times a day (21 May 2022 23:14)  polyethylene glycol 3350 oral powder for reconstitution: 17 gram(s) by gastrostomy tube every 12 hours (21 May 2022 21:16)  senna 8.6 mg oral tablet: 3 tab(s) by gastrostomy tube once a day (at bedtime) (21 May 2022 21:16)  simethicone 80 mg oral tablet, chewable: 1 tab(s) by gastrostomy tube every 6 hours (21 May 2022 21:16)  Tylenol 325 mg oral tablet: 2 tab(s) by gastrostomy tube once a day; 60 minutes prior to dressing change  (21 May 2022 21:16)  Tylenol 325 mg oral tablet: 2 tab(s) by gastrostomy tube every 6 hours, As Needed (21 May 2022 21:16)      MEDICATIONS  (STANDING):  albuterol/ipratropium for Nebulization 3 milliLiter(s) Nebulizer every 6 hours  chlorhexidine 0.12% Liquid 15 milliLiter(s) Oral Mucosa every 12 hours  collagenase Ointment 1 Application(s) Topical daily  dextrose 5%. 1000 milliLiter(s) (100 mL/Hr) IV Continuous <Continuous>  dextrose 5%. 1000 milliLiter(s) (50 mL/Hr) IV Continuous <Continuous>  dextrose 50% Injectable 25 Gram(s) IV Push once  dextrose 50% Injectable 12.5 Gram(s) IV Push once  dextrose 50% Injectable 25 Gram(s) IV Push once  folic acid 1 milliGRAM(s) Oral daily  glucagon  Injectable 1 milliGRAM(s) IntraMuscular once  heparin   Injectable 5000 Unit(s) SubCutaneous every 12 hours  insulin glargine Injectable (LANTUS) 8 Unit(s) SubCutaneous every morning  insulin lispro (ADMELOG) corrective regimen sliding scale   SubCutaneous every 6 hours  lactobacillus acidophilus 1 Tablet(s) Oral daily  LORazepam     Tablet 2 milliGRAM(s) Oral every 4 hours  methocarbamol 500 milliGRAM(s) Oral two times a day  multivitamin 1 Tablet(s) Oral daily  nystatin Powder 1 Application(s) Topical three times a day  pantoprazole  Injectable 40 milliGRAM(s) IV Push daily  piperacillin/tazobactam IVPB.. 3.375 Gram(s) IV Intermittent every 8 hours  polyethylene glycol 3350 17 Gram(s) Oral every 12 hours  povidone iodine 10% Solution 1 Application(s) Topical daily  senna 3 Tablet(s) Oral at bedtime  simethicone 80 milliGRAM(s) Chew every 6 hours  sodium chloride 7% Inhalation 4 milliLiter(s) Inhalation every 12 hours  trimethoprim  160 mG/sulfamethoxazole 800 mG 2 Tablet(s) Oral two times a day    MEDICATIONS  (PRN):  acetaminophen     Tablet .. 650 milliGRAM(s) Oral every 6 hours PRN Temp greater or equal to 38C (100.4F), Mild Pain (1 - 3)  aluminum hydroxide/magnesium hydroxide/simethicone Suspension 30 milliLiter(s) Oral every 4 hours PRN Dyspepsia  dextrose Oral Gel 15 Gram(s) Oral once PRN Blood Glucose LESS THAN 70 milliGRAM(s)/deciliter  fentaNYL    Injectable 25 MICROGram(s) IV Push every 4 hours PRN Moderate Pain (4 - 6)  melatonin 3 milliGRAM(s) Oral at bedtime PRN Insomnia  ondansetron Injectable 4 milliGRAM(s) IV Push every 8 hours PRN Nausea and/or Vomiting  sodium chloride 0.9% lock flush 10 milliLiter(s) IV Push every 1 hour PRN Pre/post blood products, medications, blood draw, and to maintain line patency      Diet, NPO with Tube Feed:   Tube Feeding Modality: Gastrostomy  Glucerna 1.5 Horacio  Total Volume for 24 Hours (mL): 900  Continuous  Starting Tube Feed Rate mL per Hour: 45  Until Goal Tube Feed Rate (mL per Hour): 45  Tube Feed Duration (in Hours): 20  Tube Feed Start Time: 00:00  Free Water Flush  Bolus   Total Volume per Flush (mL): 200   Frequency: Every 6 Hours   Total Daily Volume of Flush (mL): 800    Start Time: 00:00 (22 @ 13:25) [Active]  Diet, NPO (22 @ 20:02) [Active]          Vital Signs Last 24 Hrs  T(C): 37.3 (20 Aug 2022 08:44), Max: 38.8 (19 Aug 2022 18:45)  T(F): 99.1 (20 Aug 2022 08:44), Max: 101.8 (19 Aug 2022 18:45)  HR: 95 (20 Aug 2022 09:00) (81 - 102)  BP: 109/57 (20 Aug 2022 09:00) (91/50 - 127/69)  BP(mean): 72 (20 Aug 2022 09:00) (64 - 87)  RR: 21 (20 Aug 2022 09:00) (14 - 38)  SpO2: 95% (20 Aug 2022 09:00) (95% - 100%)    Parameters below as of 20 Aug 2022 09:00  Patient On (Oxygen Delivery Method): ventilator    O2 Concentration (%): 30      22 @ 07:01  -  22 @ 07:00  --------------------------------------------------------  IN: 1961 mL / OUT: 2175 mL / NET: -214 mL    22 @ 07:01  -  22 @ 10:00  --------------------------------------------------------  IN: 190 mL / OUT: 125 mL / NET: 65 mL        Mode: AC/ CMV (Assist Control/ Continuous Mandatory Ventilation), RR (machine): 16, TV (machine): 400, FiO2: 30, PEEP: 5, ITime: 1, MAP: 15, PIP: 26      LABS:                        8.9    14.50 )-----------( 304      ( 20 Aug 2022 06:19 )             28.4     08-    137  |  102  |  43<H>  ----------------------------<  244<H>  4.5   |  30  |  1.31<H>    Ca    8.6      20 Aug 2022 06:19  Phos  3.1       Mg     2.5         TPro  7.1  /  Alb  1.9<L>  /  TBili  0.6  /  DBili  x   /  AST  11  /  ALT  11  /  AlkPhos  104      PT/INR - ( 20 Aug 2022 06:19 )   PT: 14.6 sec;   INR: 1.27 ratio         PTT - ( 18 Aug 2022 18:45 )  PTT:45.9 sec  Urinalysis Basic - ( 18 Aug 2022 20:00 )    Color: Yellow / Appearance: Clear / S.015 / pH: x  Gluc: x / Ketone: Trace  / Bili: Negative / Urobili: Negative mg/dL   Blood: x / Protein: 100 mg/dL / Nitrite: Negative   Leuk Esterase: Trace / RBC: 3-5 /HPF / WBC 3-5   Sq Epi: x / Non Sq Epi: Moderate / Bacteria: Few        ABG - ( 18 Aug 2022 20:06 )  pH, Arterial: 7.48  pH, Blood: x     /  pCO2: 34    /  pO2: 168   / HCO3: 25    / Base Excess: 1.8   /  SaO2: 98.8                WBC:  WBC Count: 14.50 K/uL ( @ 06:19)  WBC Count: 9.88 K/uL ( @ 01:08)  WBC Count: 11.21 K/uL ( @ 15:21)  WBC Count: 6.78 K/uL ( @ 06:36)  WBC Count: 25.06 K/uL ( @ 18:45)      MICROBIOLOGY:  RECENT CULTURES:   ET Tube ET Tube XXXX   Few polymorphonuclear leukocytes per low power field  Rare Squamous epithelial cells per low power field  Few Gram Negative Rods per oil power field XXXX     .Blood Blood-Peripheral XXXX XXXX   No growth to date.     .Blood Blood-Peripheral XXXX XXXX   No growth to date.                PT/INR - ( 20 Aug 2022 06:19 )   PT: 14.6 sec;   INR: 1.27 ratio         PTT - ( 18 Aug 2022 18:45 )  PTT:45.9 sec    Sodium:  Sodium, Serum: 137 mmol/L ( @ 06:19)  Sodium, Serum: 138 mmol/L ( @ 06:36)  Sodium, Serum: 139 mmol/L ( @ 02:46)  Sodium, Serum: 135 mmol/L ( @ 18:45)      1.31 mg/dL  @ 06:19  1.10 mg/dL  @ 06:36  1.27 mg/dL  02:46  1.26 mg/dL  18:45      Hemoglobin:  Hemoglobin: 8.9 g/dL ( @ :19)  Hemoglobin: 8.3 g/dL ( @ 01:08)  Hemoglobin: 7.0 g/dL ( @ 15:21)  Hemoglobin: 7.5 g/dL ( @ 06:36)  Hemoglobin: 9.8 g/dL ( @ 18:45)      Platelets: Platelet Count - Automated: 304 K/uL ( @ 06:19)  Platelet Count - Automated: 292 K/uL ( @ 01:08)  Platelet Count - Automated: 297 K/uL ( @ 15:21)  Platelet Count - Automated: 272 K/uL ( @ 06:36)  Platelet Count - Automated: 466 K/uL ( @ 18:45)      LIVER FUNCTIONS - ( 20 Aug 2022 06:19 )  Alb: 1.9 g/dL / Pro: 7.1 g/dL / ALK PHOS: 104 U/L / ALT: 11 U/L DA / AST: 11 U/L / GGT: x             Urinalysis Basic - ( 18 Aug 2022 20:00 )    Color: Yellow / Appearance: Clear / S.015 / pH: x  Gluc: x / Ketone: Trace  / Bili: Negative / Urobili: Negative mg/dL   Blood: x / Protein: 100 mg/dL / Nitrite: Negative   Leuk Esterase: Trace / RBC: 3-5 /HPF / WBC 3-5   Sq Epi: x / Non Sq Epi: Moderate / Bacteria: Few        RADIOLOGY & ADDITIONAL STUDIES:      MICROBIOLOGY:  RECENT CULTURES:   ET Tube ET Tube XXXX   Few polymorphonuclear leukocytes per low power field  Rare Squamous epithelial cells per low power field  Few Gram Negative Rods per oil power field XXXX     .Blood Blood-Peripheral XXXX XXXX   No growth to date.    08-18 .Blood Blood-Peripheral XXXX XXXX   No growth to date.

## 2022-08-21 LAB
ALBUMIN SERPL ELPH-MCNC: 1.8 G/DL — LOW (ref 3.3–5)
ALP SERPL-CCNC: 109 U/L — SIGNIFICANT CHANGE UP (ref 30–120)
ALT FLD-CCNC: 11 U/L DA — SIGNIFICANT CHANGE UP (ref 10–60)
ANION GAP SERPL CALC-SCNC: 6 MMOL/L — SIGNIFICANT CHANGE UP (ref 5–17)
AST SERPL-CCNC: 11 U/L — SIGNIFICANT CHANGE UP (ref 10–40)
BILIRUB SERPL-MCNC: 0.3 MG/DL — SIGNIFICANT CHANGE UP (ref 0.2–1.2)
BUN SERPL-MCNC: 41 MG/DL — HIGH (ref 7–23)
CALCIUM SERPL-MCNC: 8.1 MG/DL — LOW (ref 8.4–10.5)
CHLORIDE SERPL-SCNC: 101 MMOL/L — SIGNIFICANT CHANGE UP (ref 96–108)
CO2 SERPL-SCNC: 30 MMOL/L — SIGNIFICANT CHANGE UP (ref 22–31)
CREAT SERPL-MCNC: 1.39 MG/DL — HIGH (ref 0.5–1.3)
CULTURE RESULTS: SIGNIFICANT CHANGE UP
EGFR: 39 ML/MIN/1.73M2 — LOW
GLUCOSE SERPL-MCNC: 159 MG/DL — HIGH (ref 70–99)
HCT VFR BLD CALC: 25.7 % — LOW (ref 34.5–45)
HGB BLD-MCNC: 7.9 G/DL — LOW (ref 11.5–15.5)
INR BLD: 1.22 RATIO — HIGH (ref 0.88–1.16)
MCHC RBC-ENTMCNC: 26.6 PG — LOW (ref 27–34)
MCHC RBC-ENTMCNC: 30.7 GM/DL — LOW (ref 32–36)
MCV RBC AUTO: 86.5 FL — SIGNIFICANT CHANGE UP (ref 80–100)
NRBC # BLD: 0 /100 WBCS — SIGNIFICANT CHANGE UP (ref 0–0)
PLATELET # BLD AUTO: 293 K/UL — SIGNIFICANT CHANGE UP (ref 150–400)
POTASSIUM SERPL-MCNC: 5.2 MMOL/L — SIGNIFICANT CHANGE UP (ref 3.5–5.3)
POTASSIUM SERPL-SCNC: 5.2 MMOL/L — SIGNIFICANT CHANGE UP (ref 3.5–5.3)
PROCALCITONIN SERPL-MCNC: 1.77 NG/ML — HIGH (ref 0.02–0.1)
PROT SERPL-MCNC: 5.8 G/DL — LOW (ref 6–8.3)
PROTHROM AB SERPL-ACNC: 14.4 SEC — HIGH (ref 10.5–13.4)
RBC # BLD: 2.97 M/UL — LOW (ref 3.8–5.2)
RBC # FLD: 16.2 % — HIGH (ref 10.3–14.5)
SODIUM SERPL-SCNC: 137 MMOL/L — SIGNIFICANT CHANGE UP (ref 135–145)
SPECIMEN SOURCE: SIGNIFICANT CHANGE UP
WBC # BLD: 9.46 K/UL — SIGNIFICANT CHANGE UP (ref 3.8–10.5)
WBC # FLD AUTO: 9.46 K/UL — SIGNIFICANT CHANGE UP (ref 3.8–10.5)

## 2022-08-21 PROCEDURE — 99233 SBSQ HOSP IP/OBS HIGH 50: CPT

## 2022-08-21 RX ORDER — FENTANYL CITRATE 50 UG/ML
50 INJECTION INTRAVENOUS ONCE
Refills: 0 | Status: DISCONTINUED | OUTPATIENT
Start: 2022-08-21 | End: 2022-08-21

## 2022-08-21 RX ORDER — CHLORHEXIDINE GLUCONATE 213 G/1000ML
1 SOLUTION TOPICAL DAILY
Refills: 0 | Status: DISCONTINUED | OUTPATIENT
Start: 2022-08-21 | End: 2022-08-26

## 2022-08-21 RX ADMIN — Medication 3 MILLILITER(S): at 20:56

## 2022-08-21 RX ADMIN — PIPERACILLIN AND TAZOBACTAM 25 GRAM(S): 4; .5 INJECTION, POWDER, LYOPHILIZED, FOR SOLUTION INTRAVENOUS at 08:20

## 2022-08-21 RX ADMIN — CHLORHEXIDINE GLUCONATE 15 MILLILITER(S): 213 SOLUTION TOPICAL at 05:08

## 2022-08-21 RX ADMIN — NYSTATIN CREAM 1 APPLICATION(S): 100000 CREAM TOPICAL at 14:16

## 2022-08-21 RX ADMIN — Medication 3 MILLILITER(S): at 07:42

## 2022-08-21 RX ADMIN — INSULIN GLARGINE 8 UNIT(S): 100 INJECTION, SOLUTION SUBCUTANEOUS at 08:21

## 2022-08-21 RX ADMIN — SIMETHICONE 80 MILLIGRAM(S): 80 TABLET, CHEWABLE ORAL at 17:41

## 2022-08-21 RX ADMIN — SIMETHICONE 80 MILLIGRAM(S): 80 TABLET, CHEWABLE ORAL at 23:03

## 2022-08-21 RX ADMIN — FENTANYL CITRATE 50 MICROGRAM(S): 50 INJECTION INTRAVENOUS at 23:55

## 2022-08-21 RX ADMIN — PIPERACILLIN AND TAZOBACTAM 25 GRAM(S): 4; .5 INJECTION, POWDER, LYOPHILIZED, FOR SOLUTION INTRAVENOUS at 17:39

## 2022-08-21 RX ADMIN — NYSTATIN CREAM 1 APPLICATION(S): 100000 CREAM TOPICAL at 06:21

## 2022-08-21 RX ADMIN — PANTOPRAZOLE SODIUM 40 MILLIGRAM(S): 20 TABLET, DELAYED RELEASE ORAL at 12:05

## 2022-08-21 RX ADMIN — CHLORHEXIDINE GLUCONATE 15 MILLILITER(S): 213 SOLUTION TOPICAL at 17:42

## 2022-08-21 RX ADMIN — Medication 2 MILLIGRAM(S): at 21:50

## 2022-08-21 RX ADMIN — Medication 2: at 05:09

## 2022-08-21 RX ADMIN — CHLORHEXIDINE GLUCONATE 1 APPLICATION(S): 213 SOLUTION TOPICAL at 05:07

## 2022-08-21 RX ADMIN — SIMETHICONE 80 MILLIGRAM(S): 80 TABLET, CHEWABLE ORAL at 05:08

## 2022-08-21 RX ADMIN — FENTANYL CITRATE 25 MICROGRAM(S): 50 INJECTION INTRAVENOUS at 01:36

## 2022-08-21 RX ADMIN — POLYETHYLENE GLYCOL 3350 17 GRAM(S): 17 POWDER, FOR SOLUTION ORAL at 05:07

## 2022-08-21 RX ADMIN — FENTANYL CITRATE 25 MICROGRAM(S): 50 INJECTION INTRAVENOUS at 16:50

## 2022-08-21 RX ADMIN — FENTANYL CITRATE 25 MICROGRAM(S): 50 INJECTION INTRAVENOUS at 12:09

## 2022-08-21 RX ADMIN — METHOCARBAMOL 500 MILLIGRAM(S): 500 TABLET, FILM COATED ORAL at 17:44

## 2022-08-21 RX ADMIN — Medication 1 APPLICATION(S): at 12:06

## 2022-08-21 RX ADMIN — Medication 1 APPLICATION(S): at 12:10

## 2022-08-21 RX ADMIN — Medication 2 TABLET(S): at 17:41

## 2022-08-21 RX ADMIN — METHOCARBAMOL 500 MILLIGRAM(S): 500 TABLET, FILM COATED ORAL at 05:08

## 2022-08-21 RX ADMIN — NYSTATIN CREAM 1 APPLICATION(S): 100000 CREAM TOPICAL at 21:50

## 2022-08-21 RX ADMIN — Medication 3 MILLILITER(S): at 14:21

## 2022-08-21 RX ADMIN — SODIUM CHLORIDE 4 MILLILITER(S): 9 INJECTION INTRAMUSCULAR; INTRAVENOUS; SUBCUTANEOUS at 07:41

## 2022-08-21 RX ADMIN — PIPERACILLIN AND TAZOBACTAM 25 GRAM(S): 4; .5 INJECTION, POWDER, LYOPHILIZED, FOR SOLUTION INTRAVENOUS at 23:04

## 2022-08-21 RX ADMIN — Medication 2 MILLIGRAM(S): at 10:54

## 2022-08-21 RX ADMIN — Medication 2 MILLIGRAM(S): at 14:16

## 2022-08-21 RX ADMIN — Medication 2 TABLET(S): at 05:08

## 2022-08-21 RX ADMIN — Medication 2: at 23:18

## 2022-08-21 RX ADMIN — FENTANYL CITRATE 25 MICROGRAM(S): 50 INJECTION INTRAVENOUS at 16:35

## 2022-08-21 RX ADMIN — FENTANYL CITRATE 25 MICROGRAM(S): 50 INJECTION INTRAVENOUS at 02:00

## 2022-08-21 RX ADMIN — Medication 2 MILLIGRAM(S): at 17:41

## 2022-08-21 RX ADMIN — FENTANYL CITRATE 25 MICROGRAM(S): 50 INJECTION INTRAVENOUS at 12:29

## 2022-08-21 RX ADMIN — Medication 1 MILLIGRAM(S): at 12:05

## 2022-08-21 RX ADMIN — HEPARIN SODIUM 5000 UNIT(S): 5000 INJECTION INTRAVENOUS; SUBCUTANEOUS at 05:08

## 2022-08-21 RX ADMIN — Medication 1 TABLET(S): at 12:05

## 2022-08-21 RX ADMIN — Medication 2 MILLIGRAM(S): at 03:45

## 2022-08-21 RX ADMIN — HEPARIN SODIUM 5000 UNIT(S): 5000 INJECTION INTRAVENOUS; SUBCUTANEOUS at 17:42

## 2022-08-21 RX ADMIN — FENTANYL CITRATE 25 MICROGRAM(S): 50 INJECTION INTRAVENOUS at 21:50

## 2022-08-21 RX ADMIN — Medication 3 MILLILITER(S): at 01:43

## 2022-08-21 RX ADMIN — Medication 4: at 12:18

## 2022-08-21 RX ADMIN — Medication 2 MILLIGRAM(S): at 06:55

## 2022-08-21 RX ADMIN — FENTANYL CITRATE 25 MICROGRAM(S): 50 INJECTION INTRAVENOUS at 22:50

## 2022-08-21 RX ADMIN — SIMETHICONE 80 MILLIGRAM(S): 80 TABLET, CHEWABLE ORAL at 12:04

## 2022-08-21 NOTE — PROGRESS NOTE ADULT - SUBJECTIVE AND OBJECTIVE BOX
Date/Time Patient Seen:  		  Referring MD:   Data Reviewed	       Patient is a 76y old  Female who presents with a chief complaint of respiratory failure and sepsis due to suspected PNA (20 Aug 2022 19:13)      Subjective/HPI     PAST MEDICAL & SURGICAL HISTORY:  Dementia of frontal lobe type    Aphasic stroke    Diabetes mellitus    Respiratory failure    Hypertension    GERD (gastroesophageal reflux disease)    Constipation    Respiratory failure    CVA (cerebral vascular accident)    HTN (hypertension)    DM (diabetes mellitus)    Advanced dementia    COVID-19 virus detected    Quadriplegia    Pneumonia    Type II diabetes mellitus    Hx of appendectomy    Gastrostomy in place    Tracheostomy in place    Tracheostomy tube present    Feeding by G-tube          Medication list         MEDICATIONS  (STANDING):  albuterol/ipratropium for Nebulization 3 milliLiter(s) Nebulizer every 6 hours  chlorhexidine 0.12% Liquid 15 milliLiter(s) Oral Mucosa every 12 hours  chlorhexidine 2% Cloths 1 Application(s) Topical daily  collagenase Ointment 1 Application(s) Topical daily  dextrose 5%. 1000 milliLiter(s) (100 mL/Hr) IV Continuous <Continuous>  dextrose 5%. 1000 milliLiter(s) (50 mL/Hr) IV Continuous <Continuous>  dextrose 50% Injectable 25 Gram(s) IV Push once  dextrose 50% Injectable 12.5 Gram(s) IV Push once  dextrose 50% Injectable 25 Gram(s) IV Push once  folic acid 1 milliGRAM(s) Oral daily  glucagon  Injectable 1 milliGRAM(s) IntraMuscular once  heparin   Injectable 5000 Unit(s) SubCutaneous every 12 hours  insulin glargine Injectable (LANTUS) 8 Unit(s) SubCutaneous every morning  insulin lispro (ADMELOG) corrective regimen sliding scale   SubCutaneous every 6 hours  lactobacillus acidophilus 1 Tablet(s) Oral daily  LORazepam     Tablet 2 milliGRAM(s) Oral every 4 hours  methocarbamol 500 milliGRAM(s) Oral two times a day  multivitamin 1 Tablet(s) Oral daily  nystatin Powder 1 Application(s) Topical three times a day  pantoprazole  Injectable 40 milliGRAM(s) IV Push daily  piperacillin/tazobactam IVPB.. 3.375 Gram(s) IV Intermittent every 8 hours  polyethylene glycol 3350 17 Gram(s) Oral every 12 hours  povidone iodine 10% Solution 1 Application(s) Topical daily  senna 3 Tablet(s) Oral at bedtime  simethicone 80 milliGRAM(s) Chew every 6 hours  sodium chloride 7% Inhalation 4 milliLiter(s) Inhalation every 12 hours  trimethoprim  160 mG/sulfamethoxazole 800 mG 2 Tablet(s) Oral two times a day    MEDICATIONS  (PRN):  acetaminophen     Tablet .. 650 milliGRAM(s) Oral every 6 hours PRN Temp greater or equal to 38C (100.4F), Mild Pain (1 - 3)  aluminum hydroxide/magnesium hydroxide/simethicone Suspension 30 milliLiter(s) Oral every 4 hours PRN Dyspepsia  dextrose Oral Gel 15 Gram(s) Oral once PRN Blood Glucose LESS THAN 70 milliGRAM(s)/deciliter  fentaNYL    Injectable 25 MICROGram(s) IV Push every 4 hours PRN Moderate Pain (4 - 6)  melatonin 3 milliGRAM(s) Oral at bedtime PRN Insomnia  ondansetron Injectable 4 milliGRAM(s) IV Push every 8 hours PRN Nausea and/or Vomiting  sodium chloride 0.9% lock flush 10 milliLiter(s) IV Push every 1 hour PRN Pre/post blood products, medications, blood draw, and to maintain line patency         Vitals log        ICU Vital Signs Last 24 Hrs  T(C): 37 (21 Aug 2022 05:20), Max: 37.6 (20 Aug 2022 08:00)  T(F): 98.6 (21 Aug 2022 05:20), Max: 99.7 (20 Aug 2022 08:00)  HR: 81 (21 Aug 2022 07:00) (76 - 98)  BP: 113/60 (21 Aug 2022 07:00) (75/62 - 127/69)  BP(mean): 76 (21 Aug 2022 07:00) (64 - 87)  ABP: --  ABP(mean): --  RR: 19 (21 Aug 2022 07:00) (15 - 35)  SpO2: 100% (21 Aug 2022 07:00) (94% - 100%)    O2 Parameters below as of 21 Aug 2022 07:00  Patient On (Oxygen Delivery Method): ventilator    O2 Concentration (%): 40         Mode: AC/ CMV (Assist Control/ Continuous Mandatory Ventilation)  RR (machine): 16  TV (machine): 400  FiO2: 40  PEEP: 5  ITime: 1  MAP: 14  PIP: 40      Input and Output:  I&O's Detail    20 Aug 2022 07:01  -  21 Aug 2022 07:00  --------------------------------------------------------  IN:    Enteral Tube Flush: 200 mL    Free Water: 800 mL    IV PiggyBack: 300 mL    Jevity 1.5: 1035 mL  Total IN: 2335 mL    OUT:    Indwelling Catheter - Urethral (mL): 830 mL  Total OUT: 830 mL    Total NET: 1505 mL          Lab Data                        7.9    9.46  )-----------( 293      ( 21 Aug 2022 06:32 )             25.7     08-21    137  |  101  |  41<H>  ----------------------------<  159<H>  5.2   |  30  |  1.39<H>    Ca    8.1<L>      21 Aug 2022 06:32  Mg     2.5     08-20    TPro  5.8<L>  /  Alb  1.8<L>  /  TBili  0.3  /  DBili  x   /  AST  11  /  ALT  11  /  AlkPhos  109  08-21            Review of Systems	      Objective     Physical Examination    heart s1s2  lung dec BS  abd soft      Pertinent Lab findings & Imaging      Annalise:  NO   Adequate UO     I&O's Detail    20 Aug 2022 07:01  -  21 Aug 2022 07:00  --------------------------------------------------------  IN:    Enteral Tube Flush: 200 mL    Free Water: 800 mL    IV PiggyBack: 300 mL    Jevity 1.5: 1035 mL  Total IN: 2335 mL    OUT:    Indwelling Catheter - Urethral (mL): 830 mL  Total OUT: 830 mL    Total NET: 1505 mL               Discussed with:     Cultures:	        Radiology

## 2022-08-21 NOTE — PROGRESS NOTE ADULT - ASSESSMENT
76F with dementia, COPD with chronic resp failure and is vent dependent, s/p PEG, hx of cardiac arrest, CHF, cor pulmonale, CVA, COVID-19 infxn, CKD, anemia, multiple admissions for respiratory distress (last admission from 6/1-6/9 diagnosed with PNA with parapneumonic pleural effusion s/p thoracentesis and Avycaz) who presents from [facility] for respiratory distress    ID eval noted - emp ABX in progress  cardio eval noted  vent support  GOC documented   is the HCP  pt is full code  old records reviewed  [facility] supportive care in progress  Fentanyl PRN on order for pain - sedation management

## 2022-08-21 NOTE — PROGRESS NOTE ADULT - SUBJECTIVE AND OBJECTIVE BOX
Patient is a 76y old  Female who presents with a chief complaint of respiratory distress (21 Aug 2022 11:48)      BRIEF HOSPITAL COURSE:   Patient is a 75 yo female with a pmh of dementia, COPD with chronic resp failure;  vent dependent, s/p PEG, cardiac arrest, CHF, cor pulmonale, CVA, COVID-19, CKD, anemia, multiple admissions for respiratory distress (last admission from 6/1-6/9 diagnosed with PNA with parapneumonic pleural effusion s/p thoracentesis and Avycaz) who presented to  ED on 08/18/22 from [facility] with respiratory distress. In Ed patient was noted to have dyspnea and hypoxia. Work up revealed WBC 25.06, platelet 466, K 5.7, lactate 3.7, ABG 7.48/34/168. Patient was given empiric abx with vanco and zosyn, along with 1750 cc of IVF. Of note, patient admitted with outpatient midline. Patient admitted to [facility].      Events last 24 hours:   pt requiring frequent sedative to control work of breathing     PAST MEDICAL & SURGICAL HISTORY:  Dementia of frontal lobe type      Aphasic stroke      Diabetes mellitus      Respiratory failure      Hypertension      GERD (gastroesophageal reflux disease)      Constipation      Respiratory failure      CVA (cerebral vascular accident)      HTN (hypertension)      DM (diabetes mellitus)      Advanced dementia      COVID-19 virus detected      Quadriplegia      Pneumonia      Type II diabetes mellitus      Hx of appendectomy      Gastrostomy in place      Tracheostomy in place      Tracheostomy tube present      Feeding by G-tube        Allergies    codeine (Hives)    Intolerances      FAMILY HISTORY:  No pertinent family history in first degree relatives    social hx: unable to obtain 2/2 trach     Review of Systems:  unable to obtain 2/2 trach     Medications:  piperacillin/tazobactam IVPB.. 3.375 Gram(s) IV Intermittent every 8 hours  trimethoprim  160 mG/sulfamethoxazole 800 mG 2 Tablet(s) Oral two times a day      albuterol/ipratropium for Nebulization 3 milliLiter(s) Nebulizer every 6 hours  sodium chloride 7% Inhalation 4 milliLiter(s) Inhalation every 12 hours    acetaminophen     Tablet .. 650 milliGRAM(s) Oral every 6 hours PRN  fentaNYL    Injectable 25 MICROGram(s) IV Push every 4 hours PRN  LORazepam     Tablet 2 milliGRAM(s) Oral every 4 hours  melatonin 3 milliGRAM(s) Oral at bedtime PRN  methocarbamol 500 milliGRAM(s) Oral two times a day  ondansetron Injectable 4 milliGRAM(s) IV Push every 8 hours PRN      heparin   Injectable 5000 Unit(s) SubCutaneous every 12 hours    aluminum hydroxide/magnesium hydroxide/simethicone Suspension 30 milliLiter(s) Oral every 4 hours PRN  pantoprazole  Injectable 40 milliGRAM(s) IV Push daily  polyethylene glycol 3350 17 Gram(s) Oral every 12 hours  senna 3 Tablet(s) Oral at bedtime  simethicone 80 milliGRAM(s) Chew every 6 hours      dextrose 50% Injectable 25 Gram(s) IV Push once  dextrose 50% Injectable 12.5 Gram(s) IV Push once  dextrose 50% Injectable 25 Gram(s) IV Push once  dextrose Oral Gel 15 Gram(s) Oral once PRN  glucagon  Injectable 1 milliGRAM(s) IntraMuscular once  insulin glargine Injectable (LANTUS) 8 Unit(s) SubCutaneous every morning  insulin lispro (ADMELOG) corrective regimen sliding scale   SubCutaneous every 6 hours    dextrose 5%. 1000 milliLiter(s) IV Continuous <Continuous>  dextrose 5%. 1000 milliLiter(s) IV Continuous <Continuous>  folic acid 1 milliGRAM(s) Oral daily  multivitamin 1 Tablet(s) Oral daily  sodium chloride 0.9% lock flush 10 milliLiter(s) IV Push every 1 hour PRN      chlorhexidine 0.12% Liquid 15 milliLiter(s) Oral Mucosa every 12 hours  chlorhexidine 2% Cloths 1 Application(s) Topical daily  collagenase Ointment 1 Application(s) Topical daily  nystatin Powder 1 Application(s) Topical three times a day  povidone iodine 10% Solution 1 Application(s) Topical daily    lactobacillus acidophilus 1 Tablet(s) Oral daily      Mode: AC/ CMV (Assist Control/ Continuous Mandatory Ventilation)  RR (machine): 16  TV (machine): 400  FiO2: 35  PEEP: 5  ITime: 1  MAP: 16  PIP: 30      ICU Vital Signs Last 24 Hrs  T(C): 36.8 (21 Aug 2022 19:38), Max: 37.6 (21 Aug 2022 00:08)  T(F): 98.3 (21 Aug 2022 19:38), Max: 99.7 (21 Aug 2022 00:08)  HR: 76 (21 Aug 2022 18:12) (70 - 87)  BP: 120/58 (21 Aug 2022 18:12) (89/57 - 160/78)  BP(mean): 76 (21 Aug 2022 18:12) (64 - 105)  ABP: --  ABP(mean): --  RR: 28 (21 Aug 2022 18:12) (15 - 28)  SpO2: 100% (21 Aug 2022 18:12) (94% - 100%)    O2 Parameters below as of 21 Aug 2022 07:00  Patient On (Oxygen Delivery Method): ventilator    O2 Concentration (%): 40      Vital Signs Last 24 Hrs  T(C): 36.8 (21 Aug 2022 19:38), Max: 37.6 (21 Aug 2022 00:08)  T(F): 98.3 (21 Aug 2022 19:38), Max: 99.7 (21 Aug 2022 00:08)  HR: 76 (21 Aug 2022 18:12) (70 - 87)  BP: 120/58 (21 Aug 2022 18:12) (89/57 - 160/78)  BP(mean): 76 (21 Aug 2022 18:12) (64 - 105)  RR: 28 (21 Aug 2022 18:12) (15 - 28)  SpO2: 100% (21 Aug 2022 18:12) (94% - 100%)    Parameters below as of 21 Aug 2022 07:00  Patient On (Oxygen Delivery Method): ventilator    O2 Concentration (%): 40        I&O's Detail    20 Aug 2022 07:01  -  21 Aug 2022 07:00  --------------------------------------------------------  IN:    Enteral Tube Flush: 200 mL    Free Water: 800 mL    IV PiggyBack: 300 mL    Jevity 1.5: 1035 mL  Total IN: 2335 mL    OUT:    Indwelling Catheter - Urethral (mL): 830 mL  Total OUT: 830 mL    Total NET: 1505 mL      21 Aug 2022 07:01  -  21 Aug 2022 20:21  --------------------------------------------------------  IN:    Enteral Tube Flush: 100 mL    Free Water: 400 mL    Jevity 1.5: 495 mL  Total IN: 995 mL    OUT:    Indwelling Catheter - Urethral (mL): 300 mL    Voided (mL): 150 mL  Total OUT: 450 mL    Total NET: 545 mL            LABS:                        7.9    9.46  )-----------( 293      ( 21 Aug 2022 06:32 )             25.7     08-21    137  |  101  |  41<H>  ----------------------------<  159<H>  5.2   |  30  |  1.39<H>    Ca    8.1<L>      21 Aug 2022 06:32  Mg     2.5     08-20    TPro  5.8<L>  /  Alb  1.8<L>  /  TBili  0.3  /  DBili  x   /  AST  11  /  ALT  11  /  AlkPhos  109  08-21          CAPILLARY BLOOD GLUCOSE      POCT Blood Glucose.: 154 mg/dL (21 Aug 2022 17:48)    PT/INR - ( 21 Aug 2022 06:32 )   PT: 14.4 sec;   INR: 1.22 ratio             CULTURES:  Culture Results:   Normal Respiratory Elvira present (08-19 @ 20:45)  Culture Results:   Culture in progress (08-18 @ 20:00)  Culture Results:   No growth to date. (08-18 @ 19:14)  Culture Results:   No growth to date. (08-18 @ 18:45)      Physical Examination:    General: elderly female, chronic trach, mild distress     HEENT: Pupils equal, reactive to light.  Symmetric.    PULM: b/l air entry     CVS: +s1/s2    ABD: Soft,  + peg     extr: mild LE edema     SKIN: Warm     RADIOLOGY:   < from: Xray Chest 1 View- PORTABLE-Urgent (Xray Chest 1 View- PORTABLE-Urgent .) (08.19.22 @ 00:22) >  INTERPRETATION:  AP chest on August 18, 2022 at 7:10 PM. Patient requires   tracheostomy. Patient is also short of breath and hypoxic.    Heart size normal. Tracheostomy again noted.    Present film shows a large right upper lobe infiltrate and a significant   left perihilar infiltrate. Infiltrates are new since June 9 of this year.    Follow-up AP chest on August 19, 2022 at 12:12 AM.    Right jugular line is been inserted in good position.    Slight extension of infiltrates.    IMPRESSION: There are significant bilateral infiltrates. Latest film   shows rightjugular line inserted.

## 2022-08-21 NOTE — PROGRESS NOTE ADULT - ASSESSMENT
REVIEW OF SYMPTOMS      Able to give (reliable) ROS  NO     PHYSICAL EXAM    HEENT Unremarkable  atraumatic   RESP Fair air entry EXP prolonged    Harsh breath sound Resp distres mild   CARDIAC S1 S2 No S3     NO JVD    ABDOMEN SOFT BS PRESENT NOT DISTENDED No hepatosplenomegaly   PEDAL EDEMA present No calf tenderness  NO rash       AGE/SEX.   76 f  DOA.  8/18/2022  CC .  8/18/2022 respiratory failure, chronic trach vent, full code, recent Pneumonia/pleural effusion  shortness of breath    PROCEDURE  8/19/2022 [facility]     HOSPITAL COURSE.   WOUND CARE 8/18/2022   PNEUMONIA 8/18/2022 8/19 Tmax 101  LACTICEMIA 8/18/2022  VENT DEPENDENT  RESP FAILURE 8/18/2022    ANEMIA 8/18-8/19/2022 Hb 9.8 - 7    HYPERKALEMIA 8/18/2022   RYAN 8/18/2022   DM     PMH .  pmh Pneumonia    pmh 5/2/2022 SERRATIA CARBAPENEM RESISTANT PSEUDOMONAS   pmh HTN,   pmh DM,   pmh CVA,   pmh  chronic respiratory failure   pmh s/p trach, PEG,   pmh cardiac arrest  pmh Pl effsn  r PL EFFSN   6/8/2022 r thorac g 161 l 222 p 4.9 p 10 l 16 .38    l pl effsn  5/25/2022 g 183 l 144/381 .37 p 3.4/5.3 .64 p 23 l 36   5/25/2022l pl fluid  lymph pred exudate   cyto (-)           GENERAL DATA .   GOC.  8/18/2022 full code      ALLGY.   codeine                          WT.     8/18/2022 63                               BMI.     8/18/2022 23                         ICU STAY. 8/18/2022  COVID. 8/18/2022 scv2 (-)       BEST PRACTICE ISSUES.    HOB ELEVATN. Yes  DVT PPLX.  8/18/2022 hpsc     SQUIRES PPLX.  8/18/2022 protonix 40     INFN PPLX. 8/18/2022 chlorhexidine .12%     8/19/2022 chlorhexidine 4%   SP SW EM.        DIET. 8/18/2022 gflucerna 1200 gt        VS/ PO/IO/ VENT/ DRIPS.   8/21/2022 afeb 73 160/78   8/21/2022 ac 16/400/5/.4     ASSESSMENT/RECOMMENDATIONS .   RESP.  VENT MANAGEMENT.  HOB elevation  Target Pplat 30 (-)  Target PO 90-95%  Target pH 730 (+)  Daily spontaneous breathing trials   Daily sedation vacation     GAS EXCHANGE.  vbg 8/18/2022 vbg pH 737   8/18/2022 8p   vent 100% 16/400 748/34/168     SEDATION.  8/20/2022 fentanyl 25.6 p    COPD.   8/18/2022 duoneb   8/19 nacl 7% bid     INFECTION.  WOUND CARE  8/18/2022 povidone l and r great toe   8/19/2022 collagenase buttocks     PNEUMONIA .  w 8/18-8/19-8/20/2022 w 25 - 11 - 14   ua 8/18/2022 w 3-5   pr 8/20-8/21/2022 pr 3.3 - 1.7   ct  8/18   persistent mod bl effs   underlying dependent airspace consolidation   septal thickening and patchy ground glass opacities maddie r lung   ctap nc 8/20/2022 (-)   rvp 8/18/2022 (-)  bc 8/18/2022 (-)   mrsa 8/19/2022 (-)   8/19/2022  zosyn Se Vignesh  8/19/2022 bactrim ds 2 bid   a/r.    PAST MICROBIO.   5/2/2022 SERRATIA CARBAPENEM RESISTANT PSEUDOMONAS     PLEURAL EFFSN.  ct  8/18   persistent mod bl effs     CARDIAC.  LACTICEMIA.  la 8/18-8/19/2022 la 3.7- 1.2     CHF.  bnp 8/19/2022 bnp [id number]    echo 8/19/2022 n lvsf dd1 pasp 58   8/19/2022 lasix 40 one dose     GI.  HEMAT.  ANEMIA.   Hb 8/18-8/19-8/20-8/21/2022 Hb 9.8 - 7-8.9   - 7.9   8/19/2022 7:21 PM recheck Hb   target Hb 7 (+)       TRANSFUSION   8/19 1 u prbc    RENAL.  RYAN.  Cr 8/18-8/19- 8/20-8/21/2022 Cr 1.2- 1.1 - 1.3 - 1.3   monitor    IV fl.  none     ENDOCRINE.   DM.  8/18/2022 Insulin     NEURO.  8/19/2022 lorazepam 2.6   8/19/2022 methocarbamol 500.2       TIME SPENT   Over 39 minutes aggregate critical  care time spent on encounter; activities included   direct patient care, counseling and/or coordinating care reviewing notes, lab data/ imaging , discussion with multidisciplinary team/ patient  /family and explaining in detail risks, benefits, alternatives  of the recommendations     CHAPINCITO GUIDRY 76 f [facility] S 8/18/2022   DR JOSEPH DU

## 2022-08-21 NOTE — PROGRESS NOTE ADULT - ASSESSMENT
Patient is a 77 yo female with a pmh of dementia, COPD with chronic resp failure;  vent dependent, s/p PEG, cardiac arrest, CHF, cor pulmonale, CVA, COVID-19, CKD, anemia, who presented to  ED with resp distress, now admitted to [facility].    Problem:  - Acute on chronic hypoxic resp failure  - Severe sepsis  - Systolic HF exacerbation / pulm edema  - Hyperkalemia -> resolved  - Anemia -? blood loss  - CKD  - Acidosis -> resolved    Plan  Neuro: fentanyl , robaxin and ativan as needed to control vent compliance   Cardio: keep MAP above 65, lasix on hold  Pulm: vent dependent resp failure, not candidate for further weaning given mental status , keep sp02 above 92%, nebs q6  GI: tube feeds as tolerated , ppi  Renal: strict i/o, renally dose meds   ID: empiric zosyn and bactrim for lobar pna, ID guiding abx   HEme: heparin sq for dvt ppx  Endo: ISS q6 with lantus 8 units in morning

## 2022-08-21 NOTE — PROGRESS NOTE ADULT - SUBJECTIVE AND OBJECTIVE BOX
Patient is a 76y Female with a known history of :    HPI:  ***Patient with dementia and is non-verbal and is chronically trach/vented.  Patient is from facility and therefore information is obtained from chart.***    76F with dementia, COPD with chronic resp failure and is vent dependent, s/p PEG, hx of cardiac arrest, CHF, cor pulmonale, CVA, COVID-19 infxn, CKD, anemia, multiple admissions for respiratory distress (last admission from 6/1-6/9 diagnosed with PNA with parapneumonic pleural effusion s/p thoracentesis and Avycaz) who presents from [facility] for respiratory distress again.  [facility] transfer note only mention respiratory distress.  As per ED note, patient was noted to have increased dyspnea and worsening hypoxia.  No noted pain or reported fevers/chills.  In the ED, patient's triage vitals were /72  , RR 24, 100% on vent, T 99.7F.  Labs showed leukocytosis 25.06 (up from 6.85 on 6/10), anemia 9.8 (from 11.3), thrombocytosis 466 (from 264), hyperkalemia 5.7, lactic acid 3.7.  ABG was 7.48/34/168.  CT chest pending.  CXR showed possible consolidation on right lobe.  Patient started empirically on vancomycin and zosyn by ED.  Patient is being admitted for respiratory distress 2/2 PNA.  Currently patient is non-verbal and does not seem to be in any distress.   (18 Aug 2022 20:18)      REVIEW OF SYSTEMS:    CONSTITUTIONAL: No fever, weight loss, or fatigue  EYES: No eye pain, visual disturbances, or discharge  ENMT:  No difficulty hearing, tinnitus, vertigo; No sinus or throat pain  NECK: No pain or stiffness  BREASTS: No pain, masses, or nipple discharge  RESPIRATORY: No cough, wheezing, chills or hemoptysis; No shortness of breath  CARDIOVASCULAR: No chest pain, palpitations, dizziness, or leg swelling  GASTROINTESTINAL: No abdominal or epigastric pain. No nausea, vomiting, or hematemesis; No diarrhea or constipation. No melena or hematochezia.  GENITOURINARY: No dysuria, frequency, hematuria, or incontinence  NEUROLOGICAL: No headaches, memory loss, loss of strength, numbness, or tremors  SKIN: No itching, burning, rashes, or lesions   LYMPH NODES: No enlarged glands  ENDOCRINE: No heat or cold intolerance; No hair loss  MUSCULOSKELETAL: No joint pain or swelling; No muscle, back, or extremity pain  PSYCHIATRIC: No depression, anxiety, mood swings, or difficulty sleeping  HEME/LYMPH: No easy bruising, or bleeding gums  ALLERGY AND IMMUNOLOGIC: No hives or eczema    MEDICATIONS  (STANDING):  albuterol/ipratropium for Nebulization 3 milliLiter(s) Nebulizer every 6 hours  chlorhexidine 0.12% Liquid 15 milliLiter(s) Oral Mucosa every 12 hours  chlorhexidine 2% Cloths 1 Application(s) Topical daily  collagenase Ointment 1 Application(s) Topical daily  dextrose 5%. 1000 milliLiter(s) (100 mL/Hr) IV Continuous <Continuous>  dextrose 5%. 1000 milliLiter(s) (50 mL/Hr) IV Continuous <Continuous>  dextrose 50% Injectable 25 Gram(s) IV Push once  dextrose 50% Injectable 12.5 Gram(s) IV Push once  dextrose 50% Injectable 25 Gram(s) IV Push once  folic acid 1 milliGRAM(s) Oral daily  glucagon  Injectable 1 milliGRAM(s) IntraMuscular once  heparin   Injectable 5000 Unit(s) SubCutaneous every 12 hours  insulin glargine Injectable (LANTUS) 8 Unit(s) SubCutaneous every morning  insulin lispro (ADMELOG) corrective regimen sliding scale   SubCutaneous every 6 hours  lactobacillus acidophilus 1 Tablet(s) Oral daily  LORazepam     Tablet 2 milliGRAM(s) Oral every 4 hours  methocarbamol 500 milliGRAM(s) Oral two times a day  multivitamin 1 Tablet(s) Oral daily  nystatin Powder 1 Application(s) Topical three times a day  pantoprazole  Injectable 40 milliGRAM(s) IV Push daily  piperacillin/tazobactam IVPB.. 3.375 Gram(s) IV Intermittent every 8 hours  polyethylene glycol 3350 17 Gram(s) Oral every 12 hours  povidone iodine 10% Solution 1 Application(s) Topical daily  senna 3 Tablet(s) Oral at bedtime  simethicone 80 milliGRAM(s) Chew every 6 hours  sodium chloride 7% Inhalation 4 milliLiter(s) Inhalation every 12 hours  trimethoprim  160 mG/sulfamethoxazole 800 mG 2 Tablet(s) Oral two times a day    MEDICATIONS  (PRN):  acetaminophen     Tablet .. 650 milliGRAM(s) Oral every 6 hours PRN Temp greater or equal to 38C (100.4F), Mild Pain (1 - 3)  aluminum hydroxide/magnesium hydroxide/simethicone Suspension 30 milliLiter(s) Oral every 4 hours PRN Dyspepsia  dextrose Oral Gel 15 Gram(s) Oral once PRN Blood Glucose LESS THAN 70 milliGRAM(s)/deciliter  fentaNYL    Injectable 25 MICROGram(s) IV Push every 4 hours PRN Moderate Pain (4 - 6)  melatonin 3 milliGRAM(s) Oral at bedtime PRN Insomnia  ondansetron Injectable 4 milliGRAM(s) IV Push every 8 hours PRN Nausea and/or Vomiting  sodium chloride 0.9% lock flush 10 milliLiter(s) IV Push every 1 hour PRN Pre/post blood products, medications, blood draw, and to maintain line patency      ALLERGIES: codeine (Hives)      FAMILY HISTORY:  No pertinent family history in first degree relatives        Social history:  Alochol:   Smoking:   Drug Use:   Marital Status:     PHYSICAL EXAMINATION:  -----------------------------  T(C): 37.3 (08-21-22 @ 08:10), Max: 37.6 (08-21-22 @ 00:08)  HR: 75 (08-21-22 @ 10:00) (75 - 97)  BP: 97/55 (08-21-22 @ 10:00) (75/62 - 113/60)  RR: 19 (08-21-22 @ 10:00) (15 - 35)  SpO2: 100% (08-21-22 @ 10:00) (94% - 100%)  Wt(kg): --    08-20 @ 07:01  -  08-21 @ 07:00  --------------------------------------------------------  IN:    Enteral Tube Flush: 200 mL    Free Water: 800 mL    IV PiggyBack: 300 mL    Jevity 1.5: 1035 mL  Total IN: 2335 mL    OUT:    Indwelling Catheter - Urethral (mL): 830 mL  Total OUT: 830 mL    Total NET: 1505 mL      08-21 @ 07:01  -  08-21 @ 11:48  --------------------------------------------------------  IN:    Free Water: 200 mL    Jevity 1.5: 225 mL  Total IN: 425 mL    OUT:    Voided (mL): 150 mL  Total OUT: 150 mL    Total NET: 275 mL            Constitutional: well developed, normal appearance, well groomed, well nourished, no deformities and no acute distress.   Eyes: the conjunctiva exhibited no abnormalities and the eyelids demonstrated no xanthelasmas.   HEENT: normal oral mucosa, no oral pallor and no oral cyanosis.   Neck: normal jugular venous A waves present, normal jugular venous V waves present and no jugular venous obregon A waves.   Pulmonary: no respiratory distress, normal respiratory rhythm and effort, no accessory muscle use and lungs were clear to auscultation bilaterally. Anteriorly  Cardiovascular: heart rate and rhythm were normal, normal S1 and S2 and no murmur, gallop, rub, heave or thrill are present. .   Musculoskeletal: the gait could not be assessed.   Extremities: no clubbing of the fingernails, no localized cyanosis, no petechial hemorrhages and no ischemic changes.   Skin: normal skin color and pigmentation, no rash, no venous stasis, no skin lesions, no skin ulcer and no xanthoma was observed.       LABS:   --------  08-21    137  |  101  |  41<H>  ----------------------------<  159<H>  5.2   |  30  |  1.39<H>    Ca    8.1<L>      21 Aug 2022 06:32  Mg     2.5     08-20    TPro  5.8<L>  /  Alb  1.8<L>  /  TBili  0.3  /  DBili  x   /  AST  11  /  ALT  11  /  AlkPhos  109  08-21                         7.9    9.46  )-----------( 293      ( 21 Aug 2022 06:32 )             25.7     PT/INR - ( 21 Aug 2022 06:32 )   PT: 14.4 sec;   INR: 1.22 ratio                 Culture Results:   Normal Respiratory Elvira present (08-19 @ 20:45)    08-19 @ 20:45    Organism --   Gram Stain Blood -- Gram Stain   Few polymorphonuclear leukocytes per low power field  Rare Squamous epithelial cells per low power field  Few Gram Negative Rods per oil power field  Specimen Source ET Tube ET Tube  Culture-Blood --        Radiology:

## 2022-08-21 NOTE — PROGRESS NOTE ADULT - SUBJECTIVE AND OBJECTIVE BOX
Patient is a 76y old  Female who presents with a chief complaint of respiratory distress.      INTERVAL HPI/OVERNIGHT EVENTS: Pt with trach/vent / nonverbal, cannot provide ROS. No acute events overnight.       MEDICATIONS  (STANDING):  albuterol/ipratropium for Nebulization 3 milliLiter(s) Nebulizer every 6 hours  chlorhexidine 0.12% Liquid 15 milliLiter(s) Oral Mucosa every 12 hours  chlorhexidine 2% Cloths 1 Application(s) Topical daily  collagenase Ointment 1 Application(s) Topical daily  dextrose 5%. 1000 milliLiter(s) (100 mL/Hr) IV Continuous <Continuous>  dextrose 5%. 1000 milliLiter(s) (50 mL/Hr) IV Continuous <Continuous>  dextrose 50% Injectable 25 Gram(s) IV Push once  dextrose 50% Injectable 12.5 Gram(s) IV Push once  dextrose 50% Injectable 25 Gram(s) IV Push once  folic acid 1 milliGRAM(s) Oral daily  glucagon  Injectable 1 milliGRAM(s) IntraMuscular once  heparin   Injectable 5000 Unit(s) SubCutaneous every 12 hours  insulin glargine Injectable (LANTUS) 8 Unit(s) SubCutaneous every morning  insulin lispro (ADMELOG) corrective regimen sliding scale   SubCutaneous every 6 hours  lactobacillus acidophilus 1 Tablet(s) Oral daily  LORazepam     Tablet 2 milliGRAM(s) Oral every 4 hours  methocarbamol 500 milliGRAM(s) Oral two times a day  multivitamin 1 Tablet(s) Oral daily  nystatin Powder 1 Application(s) Topical three times a day  pantoprazole  Injectable 40 milliGRAM(s) IV Push daily  piperacillin/tazobactam IVPB.. 3.375 Gram(s) IV Intermittent every 8 hours  polyethylene glycol 3350 17 Gram(s) Oral every 12 hours  povidone iodine 10% Solution 1 Application(s) Topical daily  senna 3 Tablet(s) Oral at bedtime  simethicone 80 milliGRAM(s) Chew every 6 hours  sodium chloride 7% Inhalation 4 milliLiter(s) Inhalation every 12 hours  trimethoprim  160 mG/sulfamethoxazole 800 mG 2 Tablet(s) Oral two times a day    MEDICATIONS  (PRN):  acetaminophen     Tablet .. 650 milliGRAM(s) Oral every 6 hours PRN Temp greater or equal to 38C (100.4F), Mild Pain (1 - 3)  aluminum hydroxide/magnesium hydroxide/simethicone Suspension 30 milliLiter(s) Oral every 4 hours PRN Dyspepsia  dextrose Oral Gel 15 Gram(s) Oral once PRN Blood Glucose LESS THAN 70 milliGRAM(s)/deciliter  fentaNYL    Injectable 50 MICROGram(s) IV Push once PRN agitation  fentaNYL    Injectable 25 MICROGram(s) IV Push every 4 hours PRN Moderate Pain (4 - 6)  melatonin 3 milliGRAM(s) Oral at bedtime PRN Insomnia  ondansetron Injectable 4 milliGRAM(s) IV Push every 8 hours PRN Nausea and/or Vomiting  sodium chloride 0.9% lock flush 10 milliLiter(s) IV Push every 1 hour PRN Pre/post blood products, medications, blood draw, and to maintain line patency        Allergies    codeine (Hives)    Intolerances        REVIEW OF SYSTEMS:  Pt with trach/vent / nonverbal, cannot provide ROS.     Vital Signs Last 24 Hrs  T(C): 36.8 (21 Aug 2022 19:38), Max: 37.6 (21 Aug 2022 00:08)  T(F): 98.3 (21 Aug 2022 19:38), Max: 99.7 (21 Aug 2022 00:08)  HR: 76 (21 Aug 2022 18:12) (70 - 86)  BP: 120/58 (21 Aug 2022 18:12) (89/57 - 160/78)  BP(mean): 76 (21 Aug 2022 18:12) (65 - 105)  RR: 28 (21 Aug 2022 18:12) (15 - 28)  SpO2: 100% (21 Aug 2022 18:12) (94% - 100%)    Parameters below as of 21 Aug 2022 21:29  Patient On (Oxygen Delivery Method): ventilator        O2 Concentration (%): 30    PHYSICAL EXAM:  GENERAL: chronically ill-appearing  HEENT:  anicteric, dry mucous membranes ; trach/vent  CHEST/LUNG:  decreased breath sounds b/l   HEART:  RRR, S1, S2  ABDOMEN:  BS+, soft, no apparent tenderness, distended; G-tube in place with some maceration at entry point  EXTREMITIES: no cyanosis or apparent calf tenderness  NERVOUS SYSTEM: nonverbal, does not follow commands     LABS:                                   7.9    9.46  )-----------( 293      ( 21 Aug 2022 06:32 )             25.7     CBC Full  -  ( 21 Aug 2022 06:32 )  WBC Count : 9.46 K/uL  Hemoglobin : 7.9 g/dL  Hematocrit : 25.7 %  Platelet Count - Automated : 293 K/uL  Mean Cell Volume : 86.5 fl  Mean Cell Hemoglobin : 26.6 pg  Mean Cell Hemoglobin Concentration : 30.7 gm/dL  Auto Neutrophil # : x  Auto Lymphocyte # : x  Auto Monocyte # : x  Auto Eosinophil # : x  Auto Basophil # : x  Auto Neutrophil % : x  Auto Lymphocyte % : x  Auto Monocyte % : x  Auto Eosinophil % : x  Auto Basophil % : x        137  |  101  |  41<H>  ----------------------------<  159<H>  5.2   |  30  |  1.39<H>    Ca    8.1<L>      21 Aug 2022 06:32  Mg     2.5         TPro  5.8<L>  /  Alb  1.8<L>  /  TBili  0.3  /  DBili  x   /  AST  11  /  ALT  11  /  AlkPhos  109           Urinalysis Basic - ( 18 Aug 2022 20:00 )    Color: Yellow / Appearance: Clear / S.015 / pH: x  Gluc: x / Ketone: Trace  / Bili: Negative / Urobili: Negative mg/dL   Blood: x / Protein: 100 mg/dL / Nitrite: Negative   Leuk Esterase: Trace / RBC: 3-5 /HPF / WBC 3-5   Sq Epi: x / Non Sq Epi: Moderate / Bacteria: Few      CAPILLARY BLOOD GLUCOSE      POCT Blood Glucose.: 154 mg/dL (21 Aug 2022 17:48)  POCT Blood Glucose.: 205 mg/dL (21 Aug 2022 12:17)  POCT Blood Glucose.: 169 mg/dL (21 Aug 2022 08:16)  POCT Blood Glucose.: 180 mg/dL (21 Aug 2022 05:03)  POCT Blood Glucose.: 198 mg/dL (20 Aug 2022 23:34)          Culture - Sputum (collected 22 @ 20:45)  Source: ET Tube ET Tube  Gram Stain (22 @ 03:35):    Few polymorphonuclear leukocytes per low power field    Rare Squamous epithelial cells per low power field    Few Gram Negative Rods per oil power field  Preliminary Report (22 @ 19:12):    Normal Respiratory Elvira present    Culture - Urine (collected 22 @ 20:00)  Source: Clean Catch Clean Catch (Midstream)  Preliminary Report (22 @ 12:13):    Culture in progress    Culture - Blood (collected 22 @ 19:14)  Source: .Blood Blood-Peripheral  Preliminary Report (22 @ 01:02):    No growth to date.    Culture - Blood (collected 22 @ 18:45)  Source: .Blood Blood-Peripheral  Preliminary Report (22 @ 01:02):    No growth to date.        RADIOLOGY & ADDITIONAL TESTS:    Personally reviewed.     Consultant(s) Notes Reviewed:  [x] YES  [ ] NO     Patient is a 76y old  Female who presents with a chief complaint of respiratory distress.       INTERVAL HPI/OVERNIGHT EVENTS: Pt with trach/vent / nonverbal, cannot provide ROS. No acute events overnight.       MEDICATIONS  (STANDING):  albuterol/ipratropium for Nebulization 3 milliLiter(s) Nebulizer every 6 hours  chlorhexidine 0.12% Liquid 15 milliLiter(s) Oral Mucosa every 12 hours  chlorhexidine 2% Cloths 1 Application(s) Topical daily  collagenase Ointment 1 Application(s) Topical daily  dextrose 5%. 1000 milliLiter(s) (100 mL/Hr) IV Continuous <Continuous>  dextrose 5%. 1000 milliLiter(s) (50 mL/Hr) IV Continuous <Continuous>  dextrose 50% Injectable 25 Gram(s) IV Push once  dextrose 50% Injectable 12.5 Gram(s) IV Push once  dextrose 50% Injectable 25 Gram(s) IV Push once  folic acid 1 milliGRAM(s) Oral daily  glucagon  Injectable 1 milliGRAM(s) IntraMuscular once  heparin   Injectable 5000 Unit(s) SubCutaneous every 12 hours  insulin glargine Injectable (LANTUS) 8 Unit(s) SubCutaneous every morning  insulin lispro (ADMELOG) corrective regimen sliding scale   SubCutaneous every 6 hours  lactobacillus acidophilus 1 Tablet(s) Oral daily  LORazepam     Tablet 2 milliGRAM(s) Oral every 4 hours  methocarbamol 500 milliGRAM(s) Oral two times a day  multivitamin 1 Tablet(s) Oral daily  nystatin Powder 1 Application(s) Topical three times a day  pantoprazole  Injectable 40 milliGRAM(s) IV Push daily  piperacillin/tazobactam IVPB.. 3.375 Gram(s) IV Intermittent every 8 hours  polyethylene glycol 3350 17 Gram(s) Oral every 12 hours  povidone iodine 10% Solution 1 Application(s) Topical daily  senna 3 Tablet(s) Oral at bedtime  simethicone 80 milliGRAM(s) Chew every 6 hours  sodium chloride 7% Inhalation 4 milliLiter(s) Inhalation every 12 hours  trimethoprim  160 mG/sulfamethoxazole 800 mG 2 Tablet(s) Oral two times a day    MEDICATIONS  (PRN):  acetaminophen     Tablet .. 650 milliGRAM(s) Oral every 6 hours PRN Temp greater or equal to 38C (100.4F), Mild Pain (1 - 3)  aluminum hydroxide/magnesium hydroxide/simethicone Suspension 30 milliLiter(s) Oral every 4 hours PRN Dyspepsia  dextrose Oral Gel 15 Gram(s) Oral once PRN Blood Glucose LESS THAN 70 milliGRAM(s)/deciliter  fentaNYL    Injectable 50 MICROGram(s) IV Push once PRN agitation  fentaNYL    Injectable 25 MICROGram(s) IV Push every 4 hours PRN Moderate Pain (4 - 6)  melatonin 3 milliGRAM(s) Oral at bedtime PRN Insomnia  ondansetron Injectable 4 milliGRAM(s) IV Push every 8 hours PRN Nausea and/or Vomiting  sodium chloride 0.9% lock flush 10 milliLiter(s) IV Push every 1 hour PRN Pre/post blood products, medications, blood draw, and to maintain line patency        Allergies    codeine (Hives)    Intolerances        REVIEW OF SYSTEMS:  Pt with trach/vent / nonverbal, cannot provide ROS.     Vital Signs Last 24 Hrs  T(C): 36.8 (21 Aug 2022 19:38), Max: 37.6 (21 Aug 2022 00:08)  T(F): 98.3 (21 Aug 2022 19:38), Max: 99.7 (21 Aug 2022 00:08)  HR: 76 (21 Aug 2022 18:12) (70 - 86)  BP: 120/58 (21 Aug 2022 18:12) (89/57 - 160/78)  BP(mean): 76 (21 Aug 2022 18:12) (65 - 105)  RR: 28 (21 Aug 2022 18:12) (15 - 28)  SpO2: 100% (21 Aug 2022 18:12) (94% - 100%)    Parameters below as of 21 Aug 2022 21:29  Patient On (Oxygen Delivery Method): ventilator        O2 Concentration (%): 30    PHYSICAL EXAM:  GENERAL: chronically ill-appearing  HEENT:  anicteric, dry mucous membranes ; trach/vent  CHEST/LUNG:  decreased breath sounds b/l   HEART:  RRR, S1, S2  ABDOMEN:  BS+, soft, no apparent tenderness, distended; G-tube in place with some maceration at entry point  EXTREMITIES: no cyanosis or apparent calf tenderness  NERVOUS SYSTEM: nonverbal, does not follow commands     LABS:                                   7.9    9.46  )-----------( 293      ( 21 Aug 2022 06:32 )             25.7     CBC Full  -  ( 21 Aug 2022 06:32 )  WBC Count : 9.46 K/uL  Hemoglobin : 7.9 g/dL  Hematocrit : 25.7 %  Platelet Count - Automated : 293 K/uL  Mean Cell Volume : 86.5 fl  Mean Cell Hemoglobin : 26.6 pg  Mean Cell Hemoglobin Concentration : 30.7 gm/dL  Auto Neutrophil # : x  Auto Lymphocyte # : x  Auto Monocyte # : x  Auto Eosinophil # : x  Auto Basophil # : x  Auto Neutrophil % : x  Auto Lymphocyte % : x  Auto Monocyte % : x  Auto Eosinophil % : x  Auto Basophil % : x        137  |  101  |  41<H>  ----------------------------<  159<H>  5.2   |  30  |  1.39<H>    Ca    8.1<L>      21 Aug 2022 06:32  Mg     2.5         TPro  5.8<L>  /  Alb  1.8<L>  /  TBili  0.3  /  DBili  x   /  AST  11  /  ALT  11  /  AlkPhos  109           Urinalysis Basic - ( 18 Aug 2022 20:00 )    Color: Yellow / Appearance: Clear / S.015 / pH: x  Gluc: x / Ketone: Trace  / Bili: Negative / Urobili: Negative mg/dL   Blood: x / Protein: 100 mg/dL / Nitrite: Negative   Leuk Esterase: Trace / RBC: 3-5 /HPF / WBC 3-5   Sq Epi: x / Non Sq Epi: Moderate / Bacteria: Few      CAPILLARY BLOOD GLUCOSE      POCT Blood Glucose.: 154 mg/dL (21 Aug 2022 17:48)  POCT Blood Glucose.: 205 mg/dL (21 Aug 2022 12:17)  POCT Blood Glucose.: 169 mg/dL (21 Aug 2022 08:16)  POCT Blood Glucose.: 180 mg/dL (21 Aug 2022 05:03)  POCT Blood Glucose.: 198 mg/dL (20 Aug 2022 23:34)          Culture - Sputum (collected 22 @ 20:45)  Source: ET Tube ET Tube  Gram Stain (22 @ 03:35):    Few polymorphonuclear leukocytes per low power field    Rare Squamous epithelial cells per low power field    Few Gram Negative Rods per oil power field  Preliminary Report (22 @ 19:12):    Normal Respiratory Elvira present    Culture - Urine (collected 22 @ 20:00)  Source: Clean Catch Clean Catch (Midstream)  Preliminary Report (22 @ 12:13):    Culture in progress    Culture - Blood (collected 22 @ 19:14)  Source: .Blood Blood-Peripheral  Preliminary Report (22 @ 01:02):    No growth to date.    Culture - Blood (collected 22 @ 18:45)  Source: .Blood Blood-Peripheral  Preliminary Report (22 @ 01:02):    No growth to date.        RADIOLOGY & ADDITIONAL TESTS:    Personally reviewed.     Consultant(s) Notes Reviewed:  [x] YES  [ ] NO

## 2022-08-21 NOTE — PROGRESS NOTE ADULT - ASSESSMENT
The patient is a 76 year old female with a history of HTN, DM, CVA, dementia, chronic respiratory failure s/p trach, PEG, cardiac arrest, anemia, pleural effusions who presents with respiratory distress.    8/21/22  Seen at [facility] ICU  Lying flat, sleeping comfortably  Intubated    Plan:  - Echo 5/30/22 with normal LV systolic function, mod pulm HTN  - Repeat echo similar. IVC small/collapsible, especially in a patient receiving positive pressure ventilation suggestive of hypovolemia.  - CT chest with moderate bilateral pleural effusions and upper lobe PNA  - Pleural effusions previously were exudative, likely parapneumonic  - BNP-12,604  - Received furosemide 40 mg IV; hold off on additional IV diuretics for now - can resume furosemide 20 mg PO daily PRN  - On Zosyn, Bactrim  - Being followed by ID, Pulmonary, Palliative care

## 2022-08-21 NOTE — PROGRESS NOTE ADULT - ASSESSMENT
77yo F with PMH of dementia, COPD with chronic resp failure and is vent dependent, s/p PEG, hx of cardiac arrest, diastolic CHF, cor pulmonale, hx of CVA, COVID-19 infxn, CKD, anemia, multiple admissions for respiratory distress (recent admission from 6/1-6/9 diagnosed with PNA with parapneumonic pleural effusion s/p thoracentesis and Avycaz) who presents from [facility] for respiratory distress again a/w sepsis and respiratory failure due to suspected recurrent gram-negative PNA.    Severe sepsis and acute hypoxic respiratory failure 2/2 gram-negative PNA   - continue current vent settings (on AC with RR 16, , PEEP 5, FiO2 100%) maintain O2 sat >92%  - was on ertapenem, as per ID changed to zosyn+bactrim for coverage of prior admissions cultures of MDR Serratia and CRE Pseudomonas  - lactate downtrending, procalcitonin high but improving with treatment (4.76 -> 3.34 -> 1.77)  - cautious use of fluids given hx of CHF (received 1750cc of NS in ED)  - f/u blood cultures (NGTD), urine culture pending   - f/u trach sputum culture - has some GNR growing awaiting speciation   - has hx of respiratory distress driven by vent asynchrony and would require IV benzos ; trialed IV fentanyl with adequate effect this evening  - Checked CT Abd/P to eval for any other potential source sign of infection and found no significant sign of intra-abdominal infection on CT  - cc/pulm consult (Jaime), recs appreciated  - ID ([facility]), recs appreciated  - palliative care (Emre), recs appreciated - pt is full code    Diastolic CHF  B/L pleural effusions  - likely is a component of pulmonary edema given hx of CHF, given lasix 40mg IV x1 post-transfusion  - last TTE was 5/30 with EF 65% with normal LVSF, dilated RV, and moderate pHTN -> repeat TTE appears unchanged  - cardiology consult (Deepak), recs appreciated -rec to hold diuretic for today  - may need repeat thoracentesis (had 1.5L drained two admissions ago), pulmonology consulted; was parapneumonic on that admission as opposed to transudative and thus less likely related to CHF    Anemia of chronic disease  - received 1 units of pRBC on 8/19 for Hgb of 7.0 with appropriate response in Hgb -- now ~8  - has been evaluated by GI and hematology in the past -> dx with ACD with intermittent GI bleeding  - negative occult blood   - monitor H&H    Hyperkalemia  - given both insulin and lasix   - resolved; monitor BMP    Hx of CKD stage 3 - near baseline of 1.2-1.3  - renally dose medications and monitor BMP and electrolytes     HTN  - not on any BP meds at facility  - monitor VS    DM2  - NPO with TF  - continue with insulin 8units qAM as per last admission  - start moderate insulin coverage scale  - goal -180    Diet  - continue with same TF from last admission    Preventive measures  - cont with heparin subq for DVT ppx  - Full Code

## 2022-08-21 NOTE — PROGRESS NOTE ADULT - SUBJECTIVE AND OBJECTIVE BOX
BREA BECKHAM     [facility]    Allergies    codeine (Hives)    Intolerances        PAST MEDICAL & SURGICAL HISTORY:  Dementia of frontal lobe type      Aphasic stroke      Diabetes mellitus      Respiratory failure      Hypertension      GERD (gastroesophageal reflux disease)      Constipation      Respiratory failure      CVA (cerebral vascular accident)      HTN (hypertension)      DM (diabetes mellitus)      Advanced dementia      COVID-19 virus detected      Quadriplegia      Pneumonia      Type II diabetes mellitus      Hx of appendectomy      Gastrostomy in place      Tracheostomy in place      Tracheostomy tube present      Feeding by G-tube          FAMILY HISTORY:  No pertinent family history in first degree relatives        Home Medications:  albuterol 90 mcg/inh inhalation aerosol with adapter: 2  inhaled every 6 hours (21 May 2022 21:16)  Bacid (LAC) oral tablet: 2 tab(s) by gastrostomy tube once a day (21 May 2022 21:16)  Betadine 10% topical swab: cleanse right and left great toes (21 May 2022 21:16)  Carafate 1 g/10 mL oral suspension: 10 milliliter(s) by gastrostomy tube 4 times a day (before meals and at bedtime) for 14 days (Started 6/4/21) (21 May 2022 21:16)  chlorhexidine 0.12% mucous membrane liquid: 15 milliliter(s) mucous membrane 2 times a day (21 May 2022 21:16)  ertapenem 1 g injection: 1 gram(s) injectable once a day until 6/11 (10 Zay 2022 12:12)  Eucerin topical cream: Apply topically to affected area once a day bilateral feet (21 May 2022 21:16)  folic acid 1 mg oral tablet: 1 tab(s) orally once a day (21 May 2022 21:16)  furosemide 20 mg oral tablet: 1 tab(s) orally once a day (10 Zay 2022 12:12)  insulin glargine 100 units/mL subcutaneous solution: 8 unit(s) subcutaneous once a day (in the morning) (01 Jun 2022 08:24)  ipratropium-albuterol 0.5 mg-2.5 mg/3 mL inhalation solution: 3 milliliter(s) inhaled 4 times a day (21 May 2022 21:16)  LORazepam 1 mg oral tablet: 1 tab(s) by gastrostomy tube every 4 hours (21 May 2022 21:16)  methocarbamol 500 mg oral tablet: 1 tab(s) by gastrostomy tube 2 times a day (21 May 2022 21:16)  MiraLax oral powder for reconstitution:  (21 May 2022 23:14)  Multiple Vitamins oral tablet: 1 tab(s) orally once a day (21 May 2022 21:16)  nystatin 100,000 units/g topical powder: 1 application topically 3 times a day (29 May 2022 16:35)  omeprazole 20 mg oral delayed release capsule: orally 2 times a day (21 May 2022 23:14)  polyethylene glycol 3350 oral powder for reconstitution: 17 gram(s) by gastrostomy tube every 12 hours (21 May 2022 21:16)  senna 8.6 mg oral tablet: 3 tab(s) by gastrostomy tube once a day (at bedtime) (21 May 2022 21:16)  simethicone 80 mg oral tablet, chewable: 1 tab(s) by gastrostomy tube every 6 hours (21 May 2022 21:16)  Tylenol 325 mg oral tablet: 2 tab(s) by gastrostomy tube once a day; 60 minutes prior to dressing change  (21 May 2022 21:16)  Tylenol 325 mg oral tablet: 2 tab(s) by gastrostomy tube every 6 hours, As Needed (21 May 2022 21:16)      MEDICATIONS  (STANDING):  albuterol/ipratropium for Nebulization 3 milliLiter(s) Nebulizer every 6 hours  chlorhexidine 0.12% Liquid 15 milliLiter(s) Oral Mucosa every 12 hours  chlorhexidine 2% Cloths 1 Application(s) Topical daily  collagenase Ointment 1 Application(s) Topical daily  dextrose 5%. 1000 milliLiter(s) (100 mL/Hr) IV Continuous <Continuous>  dextrose 5%. 1000 milliLiter(s) (50 mL/Hr) IV Continuous <Continuous>  dextrose 50% Injectable 25 Gram(s) IV Push once  dextrose 50% Injectable 12.5 Gram(s) IV Push once  dextrose 50% Injectable 25 Gram(s) IV Push once  folic acid 1 milliGRAM(s) Oral daily  glucagon  Injectable 1 milliGRAM(s) IntraMuscular once  heparin   Injectable 5000 Unit(s) SubCutaneous every 12 hours  insulin glargine Injectable (LANTUS) 8 Unit(s) SubCutaneous every morning  insulin lispro (ADMELOG) corrective regimen sliding scale   SubCutaneous every 6 hours  lactobacillus acidophilus 1 Tablet(s) Oral daily  LORazepam     Tablet 2 milliGRAM(s) Oral every 4 hours  methocarbamol 500 milliGRAM(s) Oral two times a day  multivitamin 1 Tablet(s) Oral daily  nystatin Powder 1 Application(s) Topical three times a day  pantoprazole  Injectable 40 milliGRAM(s) IV Push daily  piperacillin/tazobactam IVPB.. 3.375 Gram(s) IV Intermittent every 8 hours  polyethylene glycol 3350 17 Gram(s) Oral every 12 hours  povidone iodine 10% Solution 1 Application(s) Topical daily  senna 3 Tablet(s) Oral at bedtime  simethicone 80 milliGRAM(s) Chew every 6 hours  sodium chloride 7% Inhalation 4 milliLiter(s) Inhalation every 12 hours  trimethoprim  160 mG/sulfamethoxazole 800 mG 2 Tablet(s) Oral two times a day    MEDICATIONS  (PRN):  acetaminophen     Tablet .. 650 milliGRAM(s) Oral every 6 hours PRN Temp greater or equal to 38C (100.4F), Mild Pain (1 - 3)  aluminum hydroxide/magnesium hydroxide/simethicone Suspension 30 milliLiter(s) Oral every 4 hours PRN Dyspepsia  dextrose Oral Gel 15 Gram(s) Oral once PRN Blood Glucose LESS THAN 70 milliGRAM(s)/deciliter  fentaNYL    Injectable 25 MICROGram(s) IV Push every 4 hours PRN Moderate Pain (4 - 6)  melatonin 3 milliGRAM(s) Oral at bedtime PRN Insomnia  ondansetron Injectable 4 milliGRAM(s) IV Push every 8 hours PRN Nausea and/or Vomiting  sodium chloride 0.9% lock flush 10 milliLiter(s) IV Push every 1 hour PRN Pre/post blood products, medications, blood draw, and to maintain line patency      Diet, NPO with Tube Feed:   Tube Feeding Modality: Gastrostomy  Glucerna 1.5 Horacio  Total Volume for 24 Hours (mL): 900  Continuous  Starting Tube Feed Rate mL per Hour: 45  Until Goal Tube Feed Rate (mL per Hour): 45  Tube Feed Duration (in Hours): 20  Tube Feed Start Time: 00:00  Free Water Flush  Bolus   Total Volume per Flush (mL): 200   Frequency: Every 6 Hours   Total Daily Volume of Flush (mL): 800    Start Time: 00:00 (08-19-22 @ 13:25) [Active]          Vital Signs Last 24 Hrs  T(C): 37.3 (21 Aug 2022 08:10), Max: 37.6 (21 Aug 2022 00:08)  T(F): 99.1 (21 Aug 2022 08:10), Max: 99.7 (21 Aug 2022 00:08)  HR: 80 (21 Aug 2022 08:06) (76 - 97)  BP: 104/63 (21 Aug 2022 08:06) (75/62 - 113/60)  BP(mean): 77 (21 Aug 2022 08:06) (64 - 82)  RR: 22 (21 Aug 2022 08:06) (15 - 35)  SpO2: 99% (21 Aug 2022 08:06) (94% - 100%)    Parameters below as of 21 Aug 2022 07:00  Patient On (Oxygen Delivery Method): ventilator    O2 Concentration (%): 40      08-20-22 @ 07:01  -  08-21-22 @ 07:00  --------------------------------------------------------  IN: 2335 mL / OUT: 830 mL / NET: 1505 mL        Mode: AC/ CMV (Assist Control/ Continuous Mandatory Ventilation), RR (machine): 16, TV (machine): 400, FiO2: 40, PEEP: 5, ITime: 1, MAP: 22, PIP: 35      LABS:                        7.9    9.46  )-----------( 293      ( 21 Aug 2022 06:32 )             25.7     08-21    137  |  101  |  41<H>  ----------------------------<  159<H>  5.2   |  30  |  1.39<H>    Ca    8.1<L>      21 Aug 2022 06:32  Mg     2.5     08-20    TPro  5.8<L>  /  Alb  1.8<L>  /  TBili  0.3  /  DBili  x   /  AST  11  /  ALT  11  /  AlkPhos  109  08-21    PT/INR - ( 21 Aug 2022 06:32 )   PT: 14.4 sec;   INR: 1.22 ratio                   WBC:  WBC Count: 9.46 K/uL (08-21 @ 06:32)  WBC Count: 14.50 K/uL (08-20 @ 06:19)  WBC Count: 9.88 K/uL (08-20 @ 01:08)  WBC Count: 11.21 K/uL (08-19 @ 15:21)  WBC Count: 6.78 K/uL (08-19 @ 06:36)  WBC Count: 25.06 K/uL (08-18 @ 18:45)      MICROBIOLOGY:  RECENT CULTURES:  08-19 ET Tube ET Tube XXXX   Few polymorphonuclear leukocytes per low power field  Rare Squamous epithelial cells per low power field  Few Gram Negative Rods per oil power field   Normal Respiratory Elvira present    08-18 Clean Catch Clean Catch (Midstream) XXXX XXXX   Culture in progress    08-18 .Blood Blood-Peripheral XXXX XXXX   No growth to date.    08-18 .Blood Blood-Peripheral XXXX XXXX   No growth to date.                PT/INR - ( 21 Aug 2022 06:32 )   PT: 14.4 sec;   INR: 1.22 ratio             Sodium:  Sodium, Serum: 137 mmol/L (08-21 @ 06:32)  Sodium, Serum: 137 mmol/L (08-20 @ 06:19)  Sodium, Serum: 138 mmol/L (08-19 @ 06:36)  Sodium, Serum: 139 mmol/L (08-19 @ 02:46)  Sodium, Serum: 135 mmol/L (08-18 @ 18:45)      1.39 mg/dL 08-21 @ 06:32  1.31 mg/dL 08-20 @ 06:19  1.10 mg/dL 08-19 @ 06:36  1.27 mg/dL 08-19 @ 02:46  1.26 mg/dL 08-18 @ 18:45      Hemoglobin:  Hemoglobin: 7.9 g/dL (08-21 @ 06:32)  Hemoglobin: 8.9 g/dL (08-20 @ 06:19)  Hemoglobin: 8.3 g/dL (08-20 @ 01:08)  Hemoglobin: 7.0 g/dL (08-19 @ 15:21)  Hemoglobin: 7.5 g/dL (08-19 @ 06:36)  Hemoglobin: 9.8 g/dL (08-18 @ 18:45)      Platelets: Platelet Count - Automated: 293 K/uL (08-21 @ 06:32)  Platelet Count - Automated: 304 K/uL (08-20 @ 06:19)  Platelet Count - Automated: 292 K/uL (08-20 @ 01:08)  Platelet Count - Automated: 297 K/uL (08-19 @ 15:21)  Platelet Count - Automated: 272 K/uL (08-19 @ 06:36)  Platelet Count - Automated: 466 K/uL (08-18 @ 18:45)      LIVER FUNCTIONS - ( 21 Aug 2022 06:32 )  Alb: 1.8 g/dL / Pro: 5.8 g/dL / ALK PHOS: 109 U/L / ALT: 11 U/L DA / AST: 11 U/L / GGT: x                 RADIOLOGY & ADDITIONAL STUDIES:      MICROBIOLOGY:  RECENT CULTURES:  08-19 ET Tube ET Tube XXXX   Few polymorphonuclear leukocytes per low power field  Rare Squamous epithelial cells per low power field  Few Gram Negative Rods per oil power field   Normal Respiratory Elvira present    08-18 Clean Catch Clean Catch (Midstream) XXXX XXXX   Culture in progress    08-18 .Blood Blood-Peripheral XXXX XXXX   No growth to date.    08-18 .Blood Blood-Peripheral XXXX XXXX   No growth to date.

## 2022-08-22 LAB
-  AMIKACIN: SIGNIFICANT CHANGE UP
-  AZTREONAM: SIGNIFICANT CHANGE UP
-  CEFEPIME: SIGNIFICANT CHANGE UP
-  CEFTAZIDIME/AVIBACTAM: SIGNIFICANT CHANGE UP
-  CEFTAZIDIME: SIGNIFICANT CHANGE UP
-  CEFTOLOZANE/TAZOBACTAM: SIGNIFICANT CHANGE UP
-  CIPROFLOXACIN: SIGNIFICANT CHANGE UP
-  GENTAMICIN: SIGNIFICANT CHANGE UP
-  IMIPENEM: SIGNIFICANT CHANGE UP
-  LEVOFLOXACIN: SIGNIFICANT CHANGE UP
-  LEVOFLOXACIN: SIGNIFICANT CHANGE UP
-  MEROPENEM: SIGNIFICANT CHANGE UP
-  PIPERACILLIN/TAZOBACTAM: SIGNIFICANT CHANGE UP
-  TOBRAMYCIN: SIGNIFICANT CHANGE UP
-  TRIMETHOPRIM/SULFAMETHOXAZOLE: SIGNIFICANT CHANGE UP
ALBUMIN SERPL ELPH-MCNC: 1.7 G/DL — LOW (ref 3.3–5)
ALP SERPL-CCNC: 105 U/L — SIGNIFICANT CHANGE UP (ref 30–120)
ALT FLD-CCNC: 12 U/L DA — SIGNIFICANT CHANGE UP (ref 10–60)
ANION GAP SERPL CALC-SCNC: 3 MMOL/L — LOW (ref 5–17)
AST SERPL-CCNC: 13 U/L — SIGNIFICANT CHANGE UP (ref 10–40)
BASOPHILS # BLD AUTO: 0.02 K/UL — SIGNIFICANT CHANGE UP (ref 0–0.2)
BASOPHILS NFR BLD AUTO: 0.3 % — SIGNIFICANT CHANGE UP (ref 0–2)
BILIRUB SERPL-MCNC: 0.2 MG/DL — SIGNIFICANT CHANGE UP (ref 0.2–1.2)
BUN SERPL-MCNC: 37 MG/DL — HIGH (ref 7–23)
CALCIUM SERPL-MCNC: 8.3 MG/DL — LOW (ref 8.4–10.5)
CHLORIDE SERPL-SCNC: 102 MMOL/L — SIGNIFICANT CHANGE UP (ref 96–108)
CO2 SERPL-SCNC: 30 MMOL/L — SIGNIFICANT CHANGE UP (ref 22–31)
CREAT SERPL-MCNC: 1.33 MG/DL — HIGH (ref 0.5–1.3)
CULTURE RESULTS: SIGNIFICANT CHANGE UP
EGFR: 41 ML/MIN/1.73M2 — LOW
EOSINOPHIL # BLD AUTO: 0.31 K/UL — SIGNIFICANT CHANGE UP (ref 0–0.5)
EOSINOPHIL NFR BLD AUTO: 4.1 % — SIGNIFICANT CHANGE UP (ref 0–6)
GLUCOSE SERPL-MCNC: 143 MG/DL — HIGH (ref 70–99)
HCT VFR BLD CALC: 24.5 % — LOW (ref 34.5–45)
HGB BLD-MCNC: 7.5 G/DL — LOW (ref 11.5–15.5)
IMM GRANULOCYTES NFR BLD AUTO: 1.7 % — HIGH (ref 0–1.5)
INR BLD: 1.21 RATIO — HIGH (ref 0.88–1.16)
LYMPHOCYTES # BLD AUTO: 1.13 K/UL — SIGNIFICANT CHANGE UP (ref 1–3.3)
LYMPHOCYTES # BLD AUTO: 15 % — SIGNIFICANT CHANGE UP (ref 13–44)
MCHC RBC-ENTMCNC: 26.7 PG — LOW (ref 27–34)
MCHC RBC-ENTMCNC: 30.6 GM/DL — LOW (ref 32–36)
MCV RBC AUTO: 87.2 FL — SIGNIFICANT CHANGE UP (ref 80–100)
METHOD TYPE: SIGNIFICANT CHANGE UP
METHOD TYPE: SIGNIFICANT CHANGE UP
MONOCYTES # BLD AUTO: 0.53 K/UL — SIGNIFICANT CHANGE UP (ref 0–0.9)
MONOCYTES NFR BLD AUTO: 7 % — SIGNIFICANT CHANGE UP (ref 2–14)
NEUTROPHILS # BLD AUTO: 5.41 K/UL — SIGNIFICANT CHANGE UP (ref 1.8–7.4)
NEUTROPHILS NFR BLD AUTO: 71.9 % — SIGNIFICANT CHANGE UP (ref 43–77)
NRBC # BLD: 0 /100 WBCS — SIGNIFICANT CHANGE UP (ref 0–0)
NT-PROBNP SERPL-SCNC: 3505 PG/ML — HIGH (ref 0–450)
ORGANISM # SPEC MICROSCOPIC CNT: SIGNIFICANT CHANGE UP
PLATELET # BLD AUTO: 285 K/UL — SIGNIFICANT CHANGE UP (ref 150–400)
POTASSIUM SERPL-MCNC: 5.2 MMOL/L — SIGNIFICANT CHANGE UP (ref 3.5–5.3)
POTASSIUM SERPL-SCNC: 5.2 MMOL/L — SIGNIFICANT CHANGE UP (ref 3.5–5.3)
PROCALCITONIN SERPL-MCNC: 1.21 NG/ML — HIGH (ref 0.02–0.1)
PROT SERPL-MCNC: 6.4 G/DL — SIGNIFICANT CHANGE UP (ref 6–8.3)
PROTHROM AB SERPL-ACNC: 14 SEC — HIGH (ref 10.5–13.4)
RBC # BLD: 2.81 M/UL — LOW (ref 3.8–5.2)
RBC # FLD: 16.4 % — HIGH (ref 10.3–14.5)
SODIUM SERPL-SCNC: 135 MMOL/L — SIGNIFICANT CHANGE UP (ref 135–145)
SPECIMEN SOURCE: SIGNIFICANT CHANGE UP
WBC # BLD: 7.53 K/UL — SIGNIFICANT CHANGE UP (ref 3.8–10.5)
WBC # FLD AUTO: 7.53 K/UL — SIGNIFICANT CHANGE UP (ref 3.8–10.5)

## 2022-08-22 PROCEDURE — 99233 SBSQ HOSP IP/OBS HIGH 50: CPT

## 2022-08-22 RX ORDER — FENTANYL CITRATE 50 UG/ML
25 INJECTION INTRAVENOUS EVERY 4 HOURS
Refills: 0 | Status: DISCONTINUED | OUTPATIENT
Start: 2022-08-22 | End: 2022-08-26

## 2022-08-22 RX ADMIN — Medication 2 TABLET(S): at 06:10

## 2022-08-22 RX ADMIN — PANTOPRAZOLE SODIUM 40 MILLIGRAM(S): 20 TABLET, DELAYED RELEASE ORAL at 11:31

## 2022-08-22 RX ADMIN — Medication 2 MILLIGRAM(S): at 01:47

## 2022-08-22 RX ADMIN — Medication 2: at 11:32

## 2022-08-22 RX ADMIN — PIPERACILLIN AND TAZOBACTAM 25 GRAM(S): 4; .5 INJECTION, POWDER, LYOPHILIZED, FOR SOLUTION INTRAVENOUS at 17:58

## 2022-08-22 RX ADMIN — FENTANYL CITRATE 25 MICROGRAM(S): 50 INJECTION INTRAVENOUS at 19:57

## 2022-08-22 RX ADMIN — Medication 1 APPLICATION(S): at 11:32

## 2022-08-22 RX ADMIN — INSULIN GLARGINE 8 UNIT(S): 100 INJECTION, SOLUTION SUBCUTANEOUS at 08:22

## 2022-08-22 RX ADMIN — FENTANYL CITRATE 25 MICROGRAM(S): 50 INJECTION INTRAVENOUS at 19:43

## 2022-08-22 RX ADMIN — CHLORHEXIDINE GLUCONATE 15 MILLILITER(S): 213 SOLUTION TOPICAL at 18:02

## 2022-08-22 RX ADMIN — Medication 2: at 18:04

## 2022-08-22 RX ADMIN — Medication 1 TABLET(S): at 11:31

## 2022-08-22 RX ADMIN — METHOCARBAMOL 500 MILLIGRAM(S): 500 TABLET, FILM COATED ORAL at 18:03

## 2022-08-22 RX ADMIN — Medication 2: at 23:59

## 2022-08-22 RX ADMIN — SODIUM CHLORIDE 4 MILLILITER(S): 9 INJECTION INTRAMUSCULAR; INTRAVENOUS; SUBCUTANEOUS at 07:16

## 2022-08-22 RX ADMIN — SIMETHICONE 80 MILLIGRAM(S): 80 TABLET, CHEWABLE ORAL at 06:11

## 2022-08-22 RX ADMIN — Medication 2 TABLET(S): at 18:03

## 2022-08-22 RX ADMIN — Medication 3 MILLILITER(S): at 13:57

## 2022-08-22 RX ADMIN — SIMETHICONE 80 MILLIGRAM(S): 80 TABLET, CHEWABLE ORAL at 18:03

## 2022-08-22 RX ADMIN — HEPARIN SODIUM 5000 UNIT(S): 5000 INJECTION INTRAVENOUS; SUBCUTANEOUS at 06:11

## 2022-08-22 RX ADMIN — SIMETHICONE 80 MILLIGRAM(S): 80 TABLET, CHEWABLE ORAL at 11:31

## 2022-08-22 RX ADMIN — Medication 1 APPLICATION(S): at 11:31

## 2022-08-22 RX ADMIN — CHLORHEXIDINE GLUCONATE 15 MILLILITER(S): 213 SOLUTION TOPICAL at 06:10

## 2022-08-22 RX ADMIN — NYSTATIN CREAM 1 APPLICATION(S): 100000 CREAM TOPICAL at 06:50

## 2022-08-22 RX ADMIN — CHLORHEXIDINE GLUCONATE 1 APPLICATION(S): 213 SOLUTION TOPICAL at 12:15

## 2022-08-22 RX ADMIN — PIPERACILLIN AND TAZOBACTAM 25 GRAM(S): 4; .5 INJECTION, POWDER, LYOPHILIZED, FOR SOLUTION INTRAVENOUS at 23:05

## 2022-08-22 RX ADMIN — HEPARIN SODIUM 5000 UNIT(S): 5000 INJECTION INTRAVENOUS; SUBCUTANEOUS at 18:03

## 2022-08-22 RX ADMIN — Medication 3 MILLIGRAM(S): at 21:28

## 2022-08-22 RX ADMIN — SODIUM CHLORIDE 4 MILLILITER(S): 9 INJECTION INTRAMUSCULAR; INTRAVENOUS; SUBCUTANEOUS at 20:47

## 2022-08-22 RX ADMIN — Medication 2 MILLIGRAM(S): at 14:34

## 2022-08-22 RX ADMIN — Medication 2 MILLIGRAM(S): at 18:02

## 2022-08-22 RX ADMIN — Medication 1 MILLIGRAM(S): at 11:31

## 2022-08-22 RX ADMIN — FENTANYL CITRATE 25 MICROGRAM(S): 50 INJECTION INTRAVENOUS at 06:10

## 2022-08-22 RX ADMIN — Medication 2: at 06:09

## 2022-08-22 RX ADMIN — Medication 3 MILLILITER(S): at 06:53

## 2022-08-22 RX ADMIN — Medication 2 MILLIGRAM(S): at 06:11

## 2022-08-22 RX ADMIN — METHOCARBAMOL 500 MILLIGRAM(S): 500 TABLET, FILM COATED ORAL at 06:11

## 2022-08-22 RX ADMIN — POLYETHYLENE GLYCOL 3350 17 GRAM(S): 17 POWDER, FOR SOLUTION ORAL at 18:03

## 2022-08-22 RX ADMIN — PIPERACILLIN AND TAZOBACTAM 25 GRAM(S): 4; .5 INJECTION, POWDER, LYOPHILIZED, FOR SOLUTION INTRAVENOUS at 08:35

## 2022-08-22 RX ADMIN — Medication 2 MILLIGRAM(S): at 10:19

## 2022-08-22 RX ADMIN — SIMETHICONE 80 MILLIGRAM(S): 80 TABLET, CHEWABLE ORAL at 23:05

## 2022-08-22 RX ADMIN — NYSTATIN CREAM 1 APPLICATION(S): 100000 CREAM TOPICAL at 21:28

## 2022-08-22 RX ADMIN — Medication 3 MILLILITER(S): at 20:47

## 2022-08-22 RX ADMIN — NYSTATIN CREAM 1 APPLICATION(S): 100000 CREAM TOPICAL at 14:34

## 2022-08-22 RX ADMIN — Medication 2 MILLIGRAM(S): at 21:28

## 2022-08-22 RX ADMIN — FENTANYL CITRATE 25 MICROGRAM(S): 50 INJECTION INTRAVENOUS at 07:10

## 2022-08-22 RX ADMIN — Medication 3 MILLILITER(S): at 01:55

## 2022-08-22 NOTE — PROGRESS NOTE ADULT - ASSESSMENT
76F with dementia, COPD with chronic resp failure and is vent dependent, s/p PEG, hx of cardiac arrest, CHF, cor pulmonale, CVA, COVID-19 infxn, CKD, anemia, multiple admissions for respiratory distress (last admission from 6/1-6/9 diagnosed with PNA with parapneumonic pleural effusion s/p thoracentesis and Avycaz) who presents from [facility] for respiratory distress    ID eval noted - emp ABX in progress  cardio eval noted  vent support  GOC documented   is the HCP  pt is full code  old records reviewed  [facility] supportive care in progress  Fentanyl PRN on order for pain - sedation management

## 2022-08-22 NOTE — PROGRESS NOTE ADULT - SUBJECTIVE AND OBJECTIVE BOX
Patient is a 76y old  Female who presents with a chief complaint of respiratory distress (22 Aug 2022 17:24)      BRIEF HOSPITAL COURSE:   Patient is a 77 yo female with a pmh of dementia, COPD with chronic resp failure;  vent dependent, s/p PEG, cardiac arrest, CHF, cor pulmonale, CVA, COVID-19, CKD, anemia, multiple admissions for respiratory distress (last admission from 6/1-6/9 diagnosed with PNA with parapneumonic pleural effusion s/p thoracentesis and Avycaz) who presented to  ED on 08/18/22 from [facility] with respiratory distress. In Ed patient was noted to have dyspnea and hypoxia. Work up revealed WBC 25.06, platelet 466, K 5.7, lactate 3.7, ABG 7.48/34/168. Patient was given empiric abx with vanco and zosyn, along with 1750 cc of IVF. Of note, patient admitted with outpatient midline. Patient admitted to [facility].      Events last 24 hours:   remains intermittently   pt requiring frequent sedative to control work of breathing     PAST MEDICAL & SURGICAL HISTORY:  Dementia of frontal lobe type      Aphasic stroke      Diabetes mellitus      Respiratory failure      Hypertension      GERD (gastroesophageal reflux disease)      Constipation      Respiratory failure      CVA (cerebral vascular accident)      HTN (hypertension)      DM (diabetes mellitus)      Advanced dementia      COVID-19 virus detected      Quadriplegia      Pneumonia      Type II diabetes mellitus      Hx of appendectomy      Gastrostomy in place      Tracheostomy in place      Tracheostomy tube present      Feeding by G-tube        Allergies    codeine (Hives)    Intolerances      FAMILY HISTORY:  No pertinent family history in first degree relatives        social hx: unable to obtain 2/2 trach     Review of Systems:  unable to obtain 2/2 trach     Medications:  piperacillin/tazobactam IVPB.. 3.375 Gram(s) IV Intermittent every 8 hours  trimethoprim  160 mG/sulfamethoxazole 800 mG 2 Tablet(s) Oral two times a day      albuterol/ipratropium for Nebulization 3 milliLiter(s) Nebulizer every 6 hours  sodium chloride 7% Inhalation 4 milliLiter(s) Inhalation every 12 hours    acetaminophen     Tablet .. 650 milliGRAM(s) Oral every 6 hours PRN  fentaNYL    Injectable 25 MICROGram(s) IV Push every 4 hours PRN  LORazepam     Tablet 2 milliGRAM(s) Oral every 4 hours  melatonin 3 milliGRAM(s) Oral at bedtime PRN  methocarbamol 500 milliGRAM(s) Oral two times a day  ondansetron Injectable 4 milliGRAM(s) IV Push every 8 hours PRN      heparin   Injectable 5000 Unit(s) SubCutaneous every 12 hours    aluminum hydroxide/magnesium hydroxide/simethicone Suspension 30 milliLiter(s) Oral every 4 hours PRN  pantoprazole  Injectable 40 milliGRAM(s) IV Push daily  polyethylene glycol 3350 17 Gram(s) Oral every 12 hours  senna 3 Tablet(s) Oral at bedtime  simethicone 80 milliGRAM(s) Chew every 6 hours      dextrose 50% Injectable 25 Gram(s) IV Push once  dextrose 50% Injectable 12.5 Gram(s) IV Push once  dextrose 50% Injectable 25 Gram(s) IV Push once  dextrose Oral Gel 15 Gram(s) Oral once PRN  glucagon  Injectable 1 milliGRAM(s) IntraMuscular once  insulin glargine Injectable (LANTUS) 8 Unit(s) SubCutaneous every morning  insulin lispro (ADMELOG) corrective regimen sliding scale   SubCutaneous every 6 hours    dextrose 5%. 1000 milliLiter(s) IV Continuous <Continuous>  dextrose 5%. 1000 milliLiter(s) IV Continuous <Continuous>  folic acid 1 milliGRAM(s) Oral daily  multivitamin 1 Tablet(s) Oral daily  sodium chloride 0.9% lock flush 10 milliLiter(s) IV Push every 1 hour PRN      chlorhexidine 0.12% Liquid 15 milliLiter(s) Oral Mucosa every 12 hours  chlorhexidine 2% Cloths 1 Application(s) Topical daily  collagenase Ointment 1 Application(s) Topical daily  nystatin Powder 1 Application(s) Topical three times a day  povidone iodine 10% Solution 1 Application(s) Topical daily    lactobacillus acidophilus 1 Tablet(s) Oral daily      Mode: AC/ CMV (Assist Control/ Continuous Mandatory Ventilation)  RR (machine): 16  TV (machine): 400  FiO2: 35  PEEP: 5  ITime: 1  MAP: 17  PIP: 40      ICU Vital Signs Last 24 Hrs  T(C): 36.8 (22 Aug 2022 16:14), Max: 36.8 (22 Aug 2022 16:14)  T(F): 98.2 (22 Aug 2022 16:14), Max: 98.2 (22 Aug 2022 16:14)  HR: 79 (22 Aug 2022 19:00) (64 - 93)  BP: 121/65 (22 Aug 2022 19:00) (87/49 - 121/65)  BP(mean): 82 (22 Aug 2022 19:00) (61 - 85)  ABP: --  ABP(mean): --  RR: 16 (22 Aug 2022 19:00) (14 - 36)  SpO2: 100% (22 Aug 2022 19:00) (78% - 100%)    O2 Parameters below as of 21 Aug 2022 21:29  Patient On (Oxygen Delivery Method): ventilator          Vital Signs Last 24 Hrs  T(C): 36.8 (22 Aug 2022 16:14), Max: 36.8 (22 Aug 2022 16:14)  T(F): 98.2 (22 Aug 2022 16:14), Max: 98.2 (22 Aug 2022 16:14)  HR: 79 (22 Aug 2022 19:00) (64 - 93)  BP: 121/65 (22 Aug 2022 19:00) (87/49 - 121/65)  BP(mean): 82 (22 Aug 2022 19:00) (61 - 85)  RR: 16 (22 Aug 2022 19:00) (14 - 36)  SpO2: 100% (22 Aug 2022 19:00) (78% - 100%)    Parameters below as of 21 Aug 2022 21:29  Patient On (Oxygen Delivery Method): ventilator            I&O's Detail    21 Aug 2022 07:01  -  22 Aug 2022 07:00  --------------------------------------------------------  IN:    Enteral Tube Flush: 100 mL    Free Water: 400 mL    IV PiggyBack: 100 mL    Jevity 1.5: 1035 mL  Total IN: 1635 mL    OUT:    Indwelling Catheter - Urethral (mL): 300 mL    Voided (mL): 150 mL  Total OUT: 450 mL    Total NET: 1185 mL      22 Aug 2022 07:01  -  22 Aug 2022 19:58  --------------------------------------------------------  IN:  Total IN: 0 mL    OUT:    Indwelling Catheter - Urethral (mL): 1000 mL  Total OUT: 1000 mL    Total NET: -1000 mL            LABS:                        7.5    7.53  )-----------( 285      ( 22 Aug 2022 06:35 )             24.5     08-22    135  |  102  |  37<H>  ----------------------------<  143<H>  5.2   |  30  |  1.33<H>    Ca    8.3<L>      22 Aug 2022 06:35    TPro  6.4  /  Alb  1.7<L>  /  TBili  0.2  /  DBili  x   /  AST  13  /  ALT  12  /  AlkPhos  105  08-22          CAPILLARY BLOOD GLUCOSE      POCT Blood Glucose.: 161 mg/dL (22 Aug 2022 18:00)    PT/INR - ( 22 Aug 2022 06:35 )   PT: 14.0 sec;   INR: 1.21 ratio             CULTURES:  Culture Results:   Few Pseudomonas aeruginosa (Carbapenem Resistant)  Moderate Stenotrophomonas maltophilia  Normal Respiratory Elvira present (08-19 @ 20:45)  Culture Results:   10,000 - 49,000 CFU/mL Candida albicans "Susceptibilities not performed" (08-18 @ 20:00)  Culture Results:   No growth to date. (08-18 @ 19:14)  Culture Results:   No growth to date. (08-18 @ 18:45)      Physical Examination:    General: elderly female, chronic trach, mild distress     HEENT: Pupils equal, reactive to light.  Symmetric.    PULM: b/l air entry     CVS: +s1/s2    ABD: Soft,  + peg     extr: mild LE edema     SKIN: Warm     RADIOLOGY:   < from: Xray Chest 1 View- PORTABLE-Urgent (Xray Chest 1 View- PORTABLE-Urgent .) (08.19.22 @ 00:22) >  INTERPRETATION:  AP chest on August 18, 2022 at 7:10 PM. Patient requires   tracheostomy. Patient is also short of breath and hypoxic.    Heart size normal. Tracheostomy again noted.    Present film shows a large right upper lobe infiltrate and a significant   left perihilar infiltrate. Infiltrates are new since June 9 of this year.    Follow-up AP chest on August 19, 2022 at 12:12 AM.    Right jugular line is been inserted in good position.    Slight extension of infiltrates.    IMPRESSION: There are significant bilateral infiltrates. Latest film   shows rightjugular line inserted.

## 2022-08-22 NOTE — PROGRESS NOTE ADULT - ASSESSMENT
HISTORY OF PRESENT ILLNESS:  Ruben Conway is a 70 year old male who is following up today on his obstructive sleep apnea. He was diagnosed with sleep apnea after undergoing a polysomnogram in 2012. He had a baseline apnea/hyponea index of 85.7 events per hour. He has been using CPAP at 12 cm. In addition, the patient has a history of a chronic cough. He was recommended to follow-up with his allergist regarding this issue.    Today the patient returns to the clinic for a one-year follow-up visit. He states things have been \"good\" with his sleep up until the last few months. His wife now has dementia. She has been falling out of bed at night. He has been working with Dr. Basurto to get her medications more stable to allow her to sleep more through the night. The patient states he has not been using his CPAP machine since July. He was having problems with his chronic cough at night and it was often productive of sputum. He would cough sputum into his mask and he felt it was gross to continue to use it. He gave his machine to his son who has severe sleep apnea to use it in the meantime. The patient does not snore nor is he noticing any gasping events during the night. He goes to bed at 9:30 PM. He has no problems falling asleep. He has been awakening at least 4 times a night to go the bathroom. He feels he often is nervous listening for his wife. He still occasionally has a dry cough during the night. He gets out of bed at 6 in the morning and he feels rested. When watching TV during the day, he may doze when resting his eyes. After 30 minutes he doesn't notice much difference. He has no drowsiness while driving. His weight is down 16 pounds in the last year.       ALLERGIES:   Allergen Reactions   • Pollen    • Allergy      Feathers/Down   • Cat Dander      Sneezy, puffy eyes   • Dog Dander    • Mold    • Rabbit      dander   • Dust    • Grass    • Trees      Current Outpatient Prescriptions   Medication Sig  Dispense Refill   • allopurinol (ZYLOPRIM) 100 MG tablet Take 1 tablet by mouth daily. 90 tablet 1   • atorvastatin (LIPITOR) 20 MG tablet Take 1 tablet by mouth daily. 90 tablet 1   • terazosin (HYTRIN) 2 MG capsule Take 2 capsules by mouth nightly. 180 capsule 1   • diltiazem (CARDIZEM CD) 240 MG 24 hr capsule Take 1 capsule by mouth daily. 90 capsule 1   • Aclidinium Bromide (TUDORZA PRESSAIR) 400 MCG/ACT inhaler 1 puff at night 3 Inhaler 3   • fluticasone-salmeterol (ADVAIR) 500-50 MCG/DOSE AEROSOL POWDER, BREATH ACTIVATED Inhale 1 puff into the lungs two times daily. 3 each 3   • FREESTYLE TEST STRIPS TESTING ONCE DAILY DX CODE: E11.9 100 strip 1   • Lancets (FREESTYLE) Misc USE ONE LANCET AS DIRECTED DAILY 100 each 1   • ipratropium (ATROVENT) 0.06 % nasal spray 1-2 sprays each nostril up to 3 times daily. May increase or decrease frequency based on need. 3 Bottle 3   • Calcium-Vitamin D 600-125 MG-UNIT TABS Take 1 tablet by mouth daily.     • Cetirizine HCl (ZYRTEC ALLERGY PO) Take 1 tablet by mouth daily.     • albuterol 108 (90 BASE) MCG/ACT inhaler Inhale 2 puffs into the lungs every 4 hours as needed (as needed for breathing difficulties). 1 Inhaler 1     No current facility-administered medications for this visit.        PHYSICAL EXAMINATION:  Visit Vitals   • /68 (BP Location: [facility], Patient Position: Sitting, Cuff Size: Regular)   • Pulse 76   • Resp 16   • Ht 5' 8\" (1.727 m)   • Wt 100.2 kg   • BMI 33.6 kg/m2     GENERAL: This is an alert, pleasant male who appears in no acute respiratory distress at rest.    IMPRESSION:  1. Severe obstructive sleep apnea. The patient had an apnea/hyponea index of 85.7 events per hour noted on his polysomnogram in 2012. He was recommended to use CPAP at 12 cm. He had been using his machine up until July but then he was coughing so much he felt he did not want to resume it. He recently loaned his machine to his son. He does not feel he is having any issues with his  Patient is a 75 yo female with a pmh of dementia, COPD with chronic resp failure;  vent dependent, s/p PEG, cardiac arrest, CHF, cor pulmonale, CVA, COVID-19, CKD, anemia, who presented to  ED with resp distress, now admitted to [facility].    Problem:  - Acute on chronic hypoxic resp failure  - Severe sepsis  - Systolic HF exacerbation / pulm edema  - Hyperkalemia -> resolved  - Anemia -? blood loss  - CKD  - Acidosis -> resolved    Plan  Neuro: fentanyl , robaxin and ativan as needed to control vent compliance   Cardio: keep MAP above 65, lasix on hold  Pulm: vent dependent resp failure, not candidate for further weaning given mental status , keep sp02 above 92%, nebs q6, decrease fi02 from 50% from 40%  GI: tube feeds as tolerated , ppi  Renal: strict i/o, renally dose meds   ID: empiric zosyn and bactrim for lobar pna, ID guiding abx   HEme: heparin sq for dvt ppx  Endo: ISS q6 with lantus 8 units in morning  sleep now. He is having to care for his wife who has dementia.  2. Obesity.    RECOMMENDATIONS:  1. I recommend the patient resume using his CPAP at 12 cm. He was encouraged to obtain a new mask and supplies once he decides to resume it. He is going to consider the information.  2. The patient would benefit from additional weight loss.  3. I would like the patient to tentatively follow-up with me in one year. He was advised to call if he has any problems in the meantime. All of his questions and concerns were addressed.

## 2022-08-22 NOTE — PROGRESS NOTE ADULT - SUBJECTIVE AND OBJECTIVE BOX
Patient is a 76y old  Female who presents with a chief complaint of respiratory distress.       INTERVAL HPI/OVERNIGHT EVENTS: Pt with trach/vent / nonverbal, cannot provide ROS. Nursing reported small amount of tube feed leak around the PEG tube entry site. Consulted GI.    Nursing reported small amount of tube feed leak around the PEG tube entry site. Consulted GI  MEDICATIONS  (STANDING):  albuterol/ipratropium for Nebulization 3 milliLiter(s) Nebulizer every 6 hours  chlorhexidine 0.12% Liquid 15 milliLiter(s) Oral Mucosa every 12 hours  chlorhexidine 2% Cloths 1 Application(s) Topical daily  collagenase Ointment 1 Application(s) Topical daily  dextrose 5%. 1000 milliLiter(s) (50 mL/Hr) IV Continuous <Continuous>  dextrose 5%. 1000 milliLiter(s) (100 mL/Hr) IV Continuous <Continuous>  dextrose 50% Injectable 25 Gram(s) IV Push once  dextrose 50% Injectable 12.5 Gram(s) IV Push once  dextrose 50% Injectable 25 Gram(s) IV Push once  folic acid 1 milliGRAM(s) Oral daily  glucagon  Injectable 1 milliGRAM(s) IntraMuscular once  heparin   Injectable 5000 Unit(s) SubCutaneous every 12 hours  insulin glargine Injectable (LANTUS) 8 Unit(s) SubCutaneous every morning  insulin lispro (ADMELOG) corrective regimen sliding scale   SubCutaneous every 6 hours  lactobacillus acidophilus 1 Tablet(s) Oral daily  LORazepam     Tablet 2 milliGRAM(s) Oral every 4 hours  methocarbamol 500 milliGRAM(s) Oral two times a day  multivitamin 1 Tablet(s) Oral daily  nystatin Powder 1 Application(s) Topical three times a day  pantoprazole  Injectable 40 milliGRAM(s) IV Push daily  piperacillin/tazobactam IVPB.. 3.375 Gram(s) IV Intermittent every 8 hours  polyethylene glycol 3350 17 Gram(s) Oral every 12 hours  povidone iodine 10% Solution 1 Application(s) Topical daily  senna 3 Tablet(s) Oral at bedtime  simethicone 80 milliGRAM(s) Chew every 6 hours  sodium chloride 7% Inhalation 4 milliLiter(s) Inhalation every 12 hours  trimethoprim  160 mG/sulfamethoxazole 800 mG 2 Tablet(s) Oral two times a day    MEDICATIONS  (PRN):  acetaminophen     Tablet .. 650 milliGRAM(s) Oral every 6 hours PRN Temp greater or equal to 38C (100.4F), Mild Pain (1 - 3)  aluminum hydroxide/magnesium hydroxide/simethicone Suspension 30 milliLiter(s) Oral every 4 hours PRN Dyspepsia  dextrose Oral Gel 15 Gram(s) Oral once PRN Blood Glucose LESS THAN 70 milliGRAM(s)/deciliter  fentaNYL    Injectable 25 MICROGram(s) IV Push every 4 hours PRN Moderate Pain (4 - 6), agitation  melatonin 3 milliGRAM(s) Oral at bedtime PRN Insomnia  ondansetron Injectable 4 milliGRAM(s) IV Push every 8 hours PRN Nausea and/or Vomiting  sodium chloride 0.9% lock flush 10 milliLiter(s) IV Push every 1 hour PRN Pre/post blood products, medications, blood draw, and to maintain line patency          Allergies    codeine (Hives)    Intolerances        REVIEW OF SYSTEMS:  Pt with trach/vent / nonverbal, cannot provide ROS.     Vital Signs Last 24 Hrs  T(C): 36.8 (22 Aug 2022 16:14), Max: 36.8 (21 Aug 2022 19:38)  T(F): 98.2 (22 Aug 2022 16:14), Max: 98.3 (21 Aug 2022 19:38)  HR: 72 (22 Aug 2022 17:06) (64 - 93)  BP: 108/74 (22 Aug 2022 15:00) (87/49 - 121/55)  BP(mean): 85 (22 Aug 2022 15:00) (61 - 85)  RR: 16 (22 Aug 2022 15:00) (14 - 36)  SpO2: 100% (22 Aug 2022 17:06) (78% - 100%)    Parameters below as of 21 Aug 2022 21:29  Patient On (Oxygen Delivery Method): ventilator        O2 Concentration (%): 30    PHYSICAL EXAM:  GENERAL: chronically ill-appearing  HEENT:  anicteric, dry mucous membranes ; trach/vent  CHEST/LUNG:  decreased breath sounds b/l   HEART:  RRR, S1, S2  ABDOMEN:  BS+, soft, no apparent tenderness, distended; G-tube in place with some maceration at entry point  EXTREMITIES: no cyanosis or apparent calf tenderness  NERVOUS SYSTEM: nonverbal, does not follow commands     LABS:                                   7.5    7.53  )-----------( 285      ( 22 Aug 2022 06:35 )             24.5         135  |  102  |  37<H>  ----------------------------<  143<H>  5.2   |  30  |  1.33<H>    Ca    8.3<L>      22 Aug 2022 06:35    TPro  6.4  /  Alb  1.7<L>  /  TBili  0.2  /  DBili  x   /  AST  13  /  ALT  12  /  AlkPhos  105      LIVER FUNCTIONS - ( 22 Aug 2022 06:35 )  Alb: 1.7 g/dL / Pro: 6.4 g/dL / ALK PHOS: 105 U/L / ALT: 12 U/L DA / AST: 13 U/L / GGT: x           PT/INR - ( 22 Aug 2022 06:35 )   PT: 14.0 sec;   INR: 1.21 ratio           Urinalysis Basic - ( 18 Aug 2022 20:00 )    Color: Yellow / Appearance: Clear / S.015 / pH: x  Gluc: x / Ketone: Trace  / Bili: Negative / Urobili: Negative mg/dL   Blood: x / Protein: 100 mg/dL / Nitrite: Negative   Leuk Esterase: Trace / RBC: 3-5 /HPF / WBC 3-5   Sq Epi: x / Non Sq Epi: Moderate / Bacteria: Few      CAPILLARY BLOOD GLUCOSE      POCT Blood Glucose.: 161 mg/dL (22 Aug 2022 18:00)  POCT Blood Glucose.: 185 mg/dL (22 Aug 2022 11:30)  POCT Blood Glucose.: 153 mg/dL (22 Aug 2022 08:20)            Culture - Sputum (collected 22 @ 20:45)  Source: ET Tube ET Tube  Gram Stain (22 @ 03:35):    Few polymorphonuclear leukocytes per low power field    Rare Squamous epithelial cells per low power field    Few Gram Negative Rods per oil power field  Preliminary Report (22 @ 19:12):    Normal Respiratory Elvira present    Culture - Urine (collected 22 @ 20:00)  Source: Clean Catch Clean Catch (Midstream)  Preliminary Report (22 @ 12:13):    Culture in progress    Culture - Blood (collected 22 @ 19:14)  Source: .Blood Blood-Peripheral  Preliminary Report (22 @ 01:02):    No growth to date.    Culture - Blood (collected 22 @ 18:45)  Source: .Blood Blood-Peripheral  Preliminary Report (22 @ 01:02):    No growth to date.        RADIOLOGY & ADDITIONAL TESTS:    Personally reviewed.     Consultant(s) Notes Reviewed:  [x] YES  [ ] NO     Patient is a 76y old  Female who presents with a chief complaint of respiratory distress.        INTERVAL HPI/OVERNIGHT EVENTS: Pt with trach/vent / nonverbal, cannot provide ROS. Nursing reported small amount of tube feed leak around the PEG tube entry site. Consulted GI.    Nursing reported small amount of tube feed leak around the PEG tube entry site. Consulted GI  MEDICATIONS  (STANDING):  albuterol/ipratropium for Nebulization 3 milliLiter(s) Nebulizer every 6 hours  chlorhexidine 0.12% Liquid 15 milliLiter(s) Oral Mucosa every 12 hours  chlorhexidine 2% Cloths 1 Application(s) Topical daily  collagenase Ointment 1 Application(s) Topical daily  dextrose 5%. 1000 milliLiter(s) (50 mL/Hr) IV Continuous <Continuous>  dextrose 5%. 1000 milliLiter(s) (100 mL/Hr) IV Continuous <Continuous>  dextrose 50% Injectable 25 Gram(s) IV Push once  dextrose 50% Injectable 12.5 Gram(s) IV Push once  dextrose 50% Injectable 25 Gram(s) IV Push once  folic acid 1 milliGRAM(s) Oral daily  glucagon  Injectable 1 milliGRAM(s) IntraMuscular once  heparin   Injectable 5000 Unit(s) SubCutaneous every 12 hours  insulin glargine Injectable (LANTUS) 8 Unit(s) SubCutaneous every morning  insulin lispro (ADMELOG) corrective regimen sliding scale   SubCutaneous every 6 hours  lactobacillus acidophilus 1 Tablet(s) Oral daily  LORazepam     Tablet 2 milliGRAM(s) Oral every 4 hours  methocarbamol 500 milliGRAM(s) Oral two times a day  multivitamin 1 Tablet(s) Oral daily  nystatin Powder 1 Application(s) Topical three times a day  pantoprazole  Injectable 40 milliGRAM(s) IV Push daily  piperacillin/tazobactam IVPB.. 3.375 Gram(s) IV Intermittent every 8 hours  polyethylene glycol 3350 17 Gram(s) Oral every 12 hours  povidone iodine 10% Solution 1 Application(s) Topical daily  senna 3 Tablet(s) Oral at bedtime  simethicone 80 milliGRAM(s) Chew every 6 hours  sodium chloride 7% Inhalation 4 milliLiter(s) Inhalation every 12 hours  trimethoprim  160 mG/sulfamethoxazole 800 mG 2 Tablet(s) Oral two times a day    MEDICATIONS  (PRN):  acetaminophen     Tablet .. 650 milliGRAM(s) Oral every 6 hours PRN Temp greater or equal to 38C (100.4F), Mild Pain (1 - 3)  aluminum hydroxide/magnesium hydroxide/simethicone Suspension 30 milliLiter(s) Oral every 4 hours PRN Dyspepsia  dextrose Oral Gel 15 Gram(s) Oral once PRN Blood Glucose LESS THAN 70 milliGRAM(s)/deciliter  fentaNYL    Injectable 25 MICROGram(s) IV Push every 4 hours PRN Moderate Pain (4 - 6), agitation  melatonin 3 milliGRAM(s) Oral at bedtime PRN Insomnia  ondansetron Injectable 4 milliGRAM(s) IV Push every 8 hours PRN Nausea and/or Vomiting  sodium chloride 0.9% lock flush 10 milliLiter(s) IV Push every 1 hour PRN Pre/post blood products, medications, blood draw, and to maintain line patency          Allergies    codeine (Hives)    Intolerances        REVIEW OF SYSTEMS:  Pt with trach/vent / nonverbal, cannot provide ROS.     Vital Signs Last 24 Hrs  T(C): 36.8 (22 Aug 2022 16:14), Max: 36.8 (21 Aug 2022 19:38)  T(F): 98.2 (22 Aug 2022 16:14), Max: 98.3 (21 Aug 2022 19:38)  HR: 72 (22 Aug 2022 17:06) (64 - 93)  BP: 108/74 (22 Aug 2022 15:00) (87/49 - 121/55)  BP(mean): 85 (22 Aug 2022 15:00) (61 - 85)  RR: 16 (22 Aug 2022 15:00) (14 - 36)  SpO2: 100% (22 Aug 2022 17:06) (78% - 100%)    Parameters below as of 21 Aug 2022 21:29  Patient On (Oxygen Delivery Method): ventilator        O2 Concentration (%): 30    PHYSICAL EXAM:  GENERAL: chronically ill-appearing  HEENT:  anicteric, dry mucous membranes ; trach/vent  CHEST/LUNG:  decreased breath sounds b/l   HEART:  RRR, S1, S2  ABDOMEN:  BS+, soft, no apparent tenderness, distended; G-tube in place with some maceration at entry point  EXTREMITIES: no cyanosis or apparent calf tenderness  NERVOUS SYSTEM: nonverbal, does not follow commands     LABS:                                   7.5    7.53  )-----------( 285      ( 22 Aug 2022 06:35 )             24.5         135  |  102  |  37<H>  ----------------------------<  143<H>  5.2   |  30  |  1.33<H>    Ca    8.3<L>      22 Aug 2022 06:35    TPro  6.4  /  Alb  1.7<L>  /  TBili  0.2  /  DBili  x   /  AST  13  /  ALT  12  /  AlkPhos  105      LIVER FUNCTIONS - ( 22 Aug 2022 06:35 )  Alb: 1.7 g/dL / Pro: 6.4 g/dL / ALK PHOS: 105 U/L / ALT: 12 U/L DA / AST: 13 U/L / GGT: x           PT/INR - ( 22 Aug 2022 06:35 )   PT: 14.0 sec;   INR: 1.21 ratio           Urinalysis Basic - ( 18 Aug 2022 20:00 )    Color: Yellow / Appearance: Clear / S.015 / pH: x  Gluc: x / Ketone: Trace  / Bili: Negative / Urobili: Negative mg/dL   Blood: x / Protein: 100 mg/dL / Nitrite: Negative   Leuk Esterase: Trace / RBC: 3-5 /HPF / WBC 3-5   Sq Epi: x / Non Sq Epi: Moderate / Bacteria: Few      CAPILLARY BLOOD GLUCOSE      POCT Blood Glucose.: 161 mg/dL (22 Aug 2022 18:00)  POCT Blood Glucose.: 185 mg/dL (22 Aug 2022 11:30)  POCT Blood Glucose.: 153 mg/dL (22 Aug 2022 08:20)            Culture - Sputum (collected 22 @ 20:45)  Source: ET Tube ET Tube  Gram Stain (22 @ 03:35):    Few polymorphonuclear leukocytes per low power field    Rare Squamous epithelial cells per low power field    Few Gram Negative Rods per oil power field  Preliminary Report (22 @ 19:12):    Normal Respiratory Elvira present    Culture - Urine (collected 22 @ 20:00)  Source: Clean Catch Clean Catch (Midstream)  Preliminary Report (22 @ 12:13):    Culture in progress    Culture - Blood (collected 22 @ 19:14)  Source: .Blood Blood-Peripheral  Preliminary Report (22 @ 01:02):    No growth to date.    Culture - Blood (collected 22 @ 18:45)  Source: .Blood Blood-Peripheral  Preliminary Report (22 @ 01:02):    No growth to date.        RADIOLOGY & ADDITIONAL TESTS:    Personally reviewed.     Consultant(s) Notes Reviewed:  [x] YES  [ ] NO

## 2022-08-22 NOTE — PROGRESS NOTE ADULT - SUBJECTIVE AND OBJECTIVE BOX
Date/Time Patient Seen:  		  Referring MD:   Data Reviewed	       Patient is a 76y old  Female who presents with a chief complaint of respiratory distress (21 Aug 2022 20:20)      Subjective/HPI     PAST MEDICAL & SURGICAL HISTORY:  Dementia of frontal lobe type    Aphasic stroke    Diabetes mellitus    Respiratory failure    Hypertension    GERD (gastroesophageal reflux disease)    Constipation    Respiratory failure    CVA (cerebral vascular accident)    HTN (hypertension)    DM (diabetes mellitus)    Advanced dementia    COVID-19 virus detected    Quadriplegia    Pneumonia    Type II diabetes mellitus    Hx of appendectomy    Gastrostomy in place    Tracheostomy in place    Tracheostomy tube present    Feeding by G-tube          Medication list         MEDICATIONS  (STANDING):  albuterol/ipratropium for Nebulization 3 milliLiter(s) Nebulizer every 6 hours  chlorhexidine 0.12% Liquid 15 milliLiter(s) Oral Mucosa every 12 hours  chlorhexidine 2% Cloths 1 Application(s) Topical daily  collagenase Ointment 1 Application(s) Topical daily  dextrose 5%. 1000 milliLiter(s) (100 mL/Hr) IV Continuous <Continuous>  dextrose 5%. 1000 milliLiter(s) (50 mL/Hr) IV Continuous <Continuous>  dextrose 50% Injectable 25 Gram(s) IV Push once  dextrose 50% Injectable 12.5 Gram(s) IV Push once  dextrose 50% Injectable 25 Gram(s) IV Push once  folic acid 1 milliGRAM(s) Oral daily  glucagon  Injectable 1 milliGRAM(s) IntraMuscular once  heparin   Injectable 5000 Unit(s) SubCutaneous every 12 hours  insulin glargine Injectable (LANTUS) 8 Unit(s) SubCutaneous every morning  insulin lispro (ADMELOG) corrective regimen sliding scale   SubCutaneous every 6 hours  lactobacillus acidophilus 1 Tablet(s) Oral daily  LORazepam     Tablet 2 milliGRAM(s) Oral every 4 hours  methocarbamol 500 milliGRAM(s) Oral two times a day  multivitamin 1 Tablet(s) Oral daily  nystatin Powder 1 Application(s) Topical three times a day  pantoprazole  Injectable 40 milliGRAM(s) IV Push daily  piperacillin/tazobactam IVPB.. 3.375 Gram(s) IV Intermittent every 8 hours  polyethylene glycol 3350 17 Gram(s) Oral every 12 hours  povidone iodine 10% Solution 1 Application(s) Topical daily  senna 3 Tablet(s) Oral at bedtime  simethicone 80 milliGRAM(s) Chew every 6 hours  sodium chloride 7% Inhalation 4 milliLiter(s) Inhalation every 12 hours  trimethoprim  160 mG/sulfamethoxazole 800 mG 2 Tablet(s) Oral two times a day    MEDICATIONS  (PRN):  acetaminophen     Tablet .. 650 milliGRAM(s) Oral every 6 hours PRN Temp greater or equal to 38C (100.4F), Mild Pain (1 - 3)  aluminum hydroxide/magnesium hydroxide/simethicone Suspension 30 milliLiter(s) Oral every 4 hours PRN Dyspepsia  dextrose Oral Gel 15 Gram(s) Oral once PRN Blood Glucose LESS THAN 70 milliGRAM(s)/deciliter  fentaNYL    Injectable 25 MICROGram(s) IV Push every 4 hours PRN Moderate Pain (4 - 6)  melatonin 3 milliGRAM(s) Oral at bedtime PRN Insomnia  ondansetron Injectable 4 milliGRAM(s) IV Push every 8 hours PRN Nausea and/or Vomiting  sodium chloride 0.9% lock flush 10 milliLiter(s) IV Push every 1 hour PRN Pre/post blood products, medications, blood draw, and to maintain line patency         Vitals log        ICU Vital Signs Last 24 Hrs  T(C): 36.1 (22 Aug 2022 06:00), Max: 37.3 (21 Aug 2022 08:10)  T(F): 96.9 (22 Aug 2022 06:00), Max: 99.1 (21 Aug 2022 08:10)  HR: 64 (22 Aug 2022 06:59) (64 - 86)  BP: 117/57 (22 Aug 2022 05:00) (87/49 - 160/78)  BP(mean): 75 (22 Aug 2022 05:00) (61 - 105)  ABP: --  ABP(mean): --  RR: 36 (22 Aug 2022 05:00) (15 - 36)  SpO2: 98% (22 Aug 2022 06:59) (92% - 100%)    O2 Parameters below as of 21 Aug 2022 21:29  Patient On (Oxygen Delivery Method): ventilator             Mode: AC/ CMV (Assist Control/ Continuous Mandatory Ventilation)  RR (machine): 16  TV (machine): 400  FiO2: 30  PEEP: 5  ITime: 1  MAP: 13  PIP: 27      Input and Output:  I&O's Detail    21 Aug 2022 07:01  -  22 Aug 2022 07:00  --------------------------------------------------------  IN:    Enteral Tube Flush: 100 mL    Free Water: 400 mL    IV PiggyBack: 100 mL    Jevity 1.5: 1035 mL  Total IN: 1635 mL    OUT:    Indwelling Catheter - Urethral (mL): 300 mL    Voided (mL): 150 mL  Total OUT: 450 mL    Total NET: 1185 mL          Lab Data                        7.5    7.53  )-----------( 285      ( 22 Aug 2022 06:35 )             24.5     08-21    137  |  101  |  41<H>  ----------------------------<  159<H>  5.2   |  30  |  1.39<H>    Ca    8.1<L>      21 Aug 2022 06:32    TPro  5.8<L>  /  Alb  1.8<L>  /  TBili  0.3  /  DBili  x   /  AST  11  /  ALT  11  /  AlkPhos  109  08-21            Review of Systems	      Objective     Physical Examination    heart s1s2  lung dec BS  abd soft      Pertinent Lab findings & Imaging      Annalise:  NO   Adequate UO     I&O's Detail    21 Aug 2022 07:01  -  22 Aug 2022 07:00  --------------------------------------------------------  IN:    Enteral Tube Flush: 100 mL    Free Water: 400 mL    IV PiggyBack: 100 mL    Jevity 1.5: 1035 mL  Total IN: 1635 mL    OUT:    Indwelling Catheter - Urethral (mL): 300 mL    Voided (mL): 150 mL  Total OUT: 450 mL    Total NET: 1185 mL               Discussed with:     Cultures:	        Radiology

## 2022-08-22 NOTE — PROGRESS NOTE ADULT - TIME BILLING
Note written by attending, see above.  Time spent: 55min. More than 50% of the visit was spent counseling the patient's  on medical condition - sepsis and acute hypoxic resp failure due to gram-negative PNA.
Note written by attending, see above.  Time spent: 37min.
Note written by attending, see above.  Time spent: 37min. More than 50% of the visit was spent counseling the patient's  on medical condition - sepsis and acute hypoxic resp failure due to suspected gram-negative PNA.

## 2022-08-22 NOTE — PROGRESS NOTE ADULT - ASSESSMENT
The patient is a 76 year old female with a history of HTN, DM, CVA, dementia, chronic respiratory failure s/p trach, PEG, cardiac arrest, anemia, pleural effusions who presents with respiratory distress.    8/22/22  Seen at [facility] ICU  Lying flat, sleeping comfortably  Intubated    Plan:  - Echo 5/30/22 with normal LV systolic function, mod pulm HTN  - Repeat echo similar. IVC small/collapsible, especially in a patient receiving positive pressure ventilation suggestive of hypovolemia.  - CT chest with moderate bilateral pleural effusions and upper lobe PNA  - Pleural effusions previously were exudative, likely parapneumonic  - BNP-12,604  - Received furosemide 40 mg IV; hold off on additional IV diuretics for now - can resume furosemide 20 mg PO daily PRN  - On Zosyn, Bactrim  - Being followed by ID, Pulmonary, Palliative care

## 2022-08-22 NOTE — PROGRESS NOTE ADULT - ASSESSMENT
76F with dementia, COPD with chronic resp failure and is vent dependent, s/p PEG, hx of cardiac arrest, CHF, cor pulmonale, CVA, anemia, multiple admissions for respiratory distress who presents from [facility] for respiratory distress again.     Sepsis 2/2 PNA  Acute on chronic RF  B/l pleural effusions  - most recent cultures w/ serratia and CRE pseudomonas  - imaging reviewed  - cx reviewed. BCx NGTD. UCx Candida. RCx NOF  Plan  C/w Zosyn  C/w bactrim  Supportive care, O2 and pulm toilet  trend temps/ wbc    Infectious Diseases will continue to follow. Please call with any questions.   Andressa Brantley M.D.  [facility], Division of Infectious Diseases [phone]

## 2022-08-22 NOTE — PROGRESS NOTE ADULT - ASSESSMENT
REVIEW OF SYMPTOMS      Able to give (reliable) ROS  NO     PHYSICAL EXAM    HEENT Unremarkable  atraumatic   RESP Fair air entry EXP prolonged    Harsh breath sound Resp distres mild   CARDIAC S1 S2 No S3     NO JVD    ABDOMEN SOFT BS PRESENT NOT DISTENDED No hepatosplenomegaly   PEDAL EDEMA present No calf tenderness  NO rash       AGE/SEX.   76 f  DOA.  8/18/2022  CC .  8/18/2022 respiratory failure, chronic trach vent, full code, recent Pneumonia/pleural effusion  shortness of breath    PROCEDURE  8/19/2022 [facility]     HOSPITAL COURSE.   WOUND CARE 8/18/2022   PNEUMONIA 8/18/2022 8/19 Tmax 101  LACTICEMIA 8/18/2022  VENT DEPENDENT  RESP FAILURE 8/18/2022    ANEMIA 8/18-8/19/2022 Hb 9.8 - 7    HYPERKALEMIA 8/18/2022   RYAN 8/18/2022   DM     PMH .  pmh Pneumonia    pmh 5/2/2022 SERRATIA CARBAPENEM RESISTANT PSEUDOMONAS   pmh HTN,   pmh DM,   pmh CVA,   pmh  chronic respiratory failure   pmh s/p trach, PEG,   pmh cardiac arrest  pmh Pl effsn  r PL EFFSN   6/8/2022 r thorac g 161 l 222 p 4.9 p 10 l 16 .38    l pl effsn  5/25/2022 g 183 l 144/381 .37 p 3.4/5.3 .64 p 23 l 36   5/25/2022l pl fluid  lymph pred exudate   cyto (-)           GENERAL DATA .   GOC.  8/18/2022 full code      ALLGY.   codeine                          WT.     8/18/2022 63                               BMI.     8/18/2022 23                         ICU STAY. 8/18/2022  COVID. 8/18/2022 scv2 (-)       BEST PRACTICE ISSUES.    HOB ELEVATN. Yes  DVT PPLX.  8/18/2022 hpsc     SQUIRES PPLX.  8/18/2022 protonix 40     INFN PPLX. 8/18/2022 chlorhexidine .12%     8/19/2022 chlorhexidine 4%   SP SW EM.        DIET. 8/18/2022 gflucerna 1200 gt                 VS/ PO/IO/ VENT/ DRIPS.   8/22/2022 69 118/59   8/22/2022 ac 16/400/5/.3    ASSESSMENT/RECOMMENDATIONS .   RESP.  VENT MANAGEMENT.  HOB elevation  Target Pplat 30 (-)  Target PO 90-95%  Target pH 730 (+)  Daily spontaneous breathing trials   Daily sedation vacation     GAS EXCHANGE.  vbg 8/18/2022 vbg pH 737   8/18/2022 8p   vent 100% 16/400 748/34/168     SEDATION.  8/20/2022 fentanyl 25.6 p    COPD.   8/18/2022 duoneb   8/19 nacl 7% bid     INFECTION.  WOUND CARE  8/18/2022 povidone l and r great toe   8/19/2022 collagenase buttocks     PNEUMONIA .  w 8/18-8/19-8/20-8/22/2022 w 25 - 11 - 14 - 7.5  ua 8/18/2022 w 3-5   pr 8/20-8/21-8/22/2022 pr 3.3 - 1.7 - 1.2   ct  8/18   persistent mod bl effs   underlying dependent airspace consolidation   septal thickening and patchy ground glass opacities maddie r lung   ctap nc 8/20/2022 (-)   rvp 8/18/2022 (-)  bc 8/18/2022 (-)   mrsa 8/19/2022 (-)   8/19/2022  zosyn Se Vignesh  8/19/2022 bactrim ds 2 bid   a/r.    PAST MICROBIO.   5/2/2022 SERRATIA CARBAPENEM RESISTANT PSEUDOMONAS     PLEURAL EFFSN.  ct  8/18   persistent mod bl effs     CARDIAC.  LACTICEMIA.  la 8/18-8/19/2022 la 3.7- 1.2     CHF.  bnp 8/19-8/22/2022 bnp [id number]  -  3505   echo 8/19/2022 n lvsf dd1 pasp 58   8/19/2022 lasix 40 one dose     GI.  HEMAT.  ANEMIA.   Hb 8/18-8/19-8/20-8/21-7/22/2022   Hb 9.8 - 7-8.9   - 7.9 - 7.5   8/19/2022 7:21 PM recheck Hb   target Hb 7 (+)       TRANSFUSION   8/19 1 u prbc    RENAL.  RYAN.  Cr 8/18-8/19- 8/20-8/21-8/22/2022   Cr 1.2- 1.1 - 1.3 - 1.3 - 1.3   monitor    IV fl.  none     ENDOCRINE.   DM.  8/18/2022 Insulin     NEURO.  8/19/2022 lorazepam 2.6   8/19/2022 methocarbamol 500.2       TIME SPENT   Over 39 minutes aggregate critical  care time spent on encounter; activities included   direct patient care, counseling and/or coordinating care reviewing notes, lab data/ imaging , discussion with multidisciplinary team/ patient  /family and explaining in detail risks, benefits, alternatives  of the recommendations     CHAPINCITO GUIDYR 76 f [facility] S 8/18/2022   DR JOSEPH DU

## 2022-08-22 NOTE — PROGRESS NOTE ADULT - ASSESSMENT
77yo F with PMH of dementia, COPD with chronic resp failure and is vent dependent, s/p PEG, hx of cardiac arrest, diastolic CHF, cor pulmonale, hx of CVA, COVID-19 infxn, CKD, anemia, multiple admissions for respiratory distress (recent admission from 6/1-6/9 diagnosed with PNA with parapneumonic pleural effusion s/p thoracentesis and Avycaz) who presents from [facility] for respiratory distress again a/w sepsis and respiratory failure due to suspected recurrent gram-negative PNA.    Severe sepsis and acute hypoxic respiratory failure 2/2 gram-negative PNA   - continue current vent settings (on AC with RR 16, , PEEP 5, FiO2 100%) maintain O2 sat >92%  - was on ertapenem, as per ID changed to zosyn+bactrim for coverage of prior admissions cultures of MDR Serratia and CRE Pseudomonas  - lactate downtrending, procalcitonin high but improving with treatment (4.76 -> 3.34 -> 1.77)  - cautious use of fluids given hx of CHF (received 1750cc of NS in ED)  - f/u blood cultures (NGTD), urine culture pending   - f/u trach sputum culture - has GNR growing awaiting speciation / sensitivities  - has hx of respiratory distress driven by vent asynchrony and would require IV benzos ; trialed IV fentanyl with adequate effect this evening  - Checked CT Abd/P to eval for any other potential source sign of infection and found no significant sign of intra-abdominal infection on CT  - cc/pulm consult (Jaime), recs appreciated  - ID ([facility]), recs appreciated  - palliative care (Emre), recs appreciated - pt is full code    Diastolic CHF  B/L pleural effusions  - likely is a component of pulmonary edema given hx of CHF, given lasix 40mg IV x1 post-transfusion  - last TTE was 5/30 with EF 65% with normal LVSF, dilated RV, and moderate pHTN -> repeat TTE appears unchanged  - cardiology consult (Deepak), recs appreciated -rec to hold diuretic for today  - may need repeat thoracentesis (had 1.5L drained two admissions ago), pulmonology consulted; was parapneumonic on that admission as opposed to transudative and thus less likely related to CHF    PEG tube leak  - Nursing reported small amount of tube feed leak around the PEG tube entry site.   - Consulted GI (Coral), recs appreciated  - pt did have tube replaced on a recent admission    Anemia of chronic disease  - received 1 unit of pRBC on 8/19 for Hgb of 7.0 with appropriate response in Hgb -- now Hgb with downtrend to 7.5  - has been evaluated by GI and hematology in the past -> dx with ACD with intermittent GI bleeding  - negative occult blood   - monitor H&H, if continues to trend down - will give another transfusion  - pt may need Retacrit -- Cr is 1.3 with eGFR of 41, but given pt has low muscle mass from being bedbound for years, this GFR estimate is likely falsely high  - consider nephro eval to assess if appropriate to give Retacrit    Hyperkalemia  - given both insulin and lasix   - resolved; monitor BMP    Hx of CKD stage 3 - near baseline of 1.2-1.3  - renally dose medications and monitor BMP and electrolytes   - Cr is 1.3 with eGFR of 41, but given pt has low muscle mass from being bedbound for years, this GFR estimate is likely falsely high  - consider nephro eval     HTN  - not on any BP meds at facility  - monitor VS    DM2  - NPO with TF  - continue with insulin 8units qAM as per last admission  - c/w moderate insulin coverage scale  - goal -180    Diet  - continue with same TF from last admission    Preventive measures  - cont with heparin subq for DVT ppx  - Full Code

## 2022-08-22 NOTE — CONSULT NOTE ADULT - ASSESSMENT
peg   distention  peg tube    plan  restart feedings  [peg plan  axr in am  check electrolytes    dressing changes

## 2022-08-22 NOTE — PROGRESS NOTE ADULT - SUBJECTIVE AND OBJECTIVE BOX
[facility], Division of Infectious Diseases  MOIZ Lora Y. Patel, S. Shah, G. [facility]  [phone]    Name: BREA BECKHAM  Age: 76y  Gender: Female  MRN: [id number]    Interval History:  Patient seen and examined at bedside this morning in [facility]  No acute overnight events. Afebrile  Notes reviewed    Antibiotics:  piperacillin/tazobactam IVPB.. 3.375 Gram(s) IV Intermittent every 8 hours  trimethoprim  160 mG/sulfamethoxazole 800 mG 2 Tablet(s) Oral two times a day      Medications:  acetaminophen     Tablet .. 650 milliGRAM(s) Oral every 6 hours PRN  albuterol/ipratropium for Nebulization 3 milliLiter(s) Nebulizer every 6 hours  aluminum hydroxide/magnesium hydroxide/simethicone Suspension 30 milliLiter(s) Oral every 4 hours PRN  chlorhexidine 0.12% Liquid 15 milliLiter(s) Oral Mucosa every 12 hours  chlorhexidine 2% Cloths 1 Application(s) Topical daily  collagenase Ointment 1 Application(s) Topical daily  dextrose 5%. 1000 milliLiter(s) IV Continuous <Continuous>  dextrose 5%. 1000 milliLiter(s) IV Continuous <Continuous>  dextrose 50% Injectable 25 Gram(s) IV Push once  dextrose 50% Injectable 12.5 Gram(s) IV Push once  dextrose 50% Injectable 25 Gram(s) IV Push once  dextrose Oral Gel 15 Gram(s) Oral once PRN  fentaNYL    Injectable 25 MICROGram(s) IV Push every 4 hours PRN  folic acid 1 milliGRAM(s) Oral daily  glucagon  Injectable 1 milliGRAM(s) IntraMuscular once  heparin   Injectable 5000 Unit(s) SubCutaneous every 12 hours  insulin glargine Injectable (LANTUS) 8 Unit(s) SubCutaneous every morning  insulin lispro (ADMELOG) corrective regimen sliding scale   SubCutaneous every 6 hours  lactobacillus acidophilus 1 Tablet(s) Oral daily  LORazepam     Tablet 2 milliGRAM(s) Oral every 4 hours  melatonin 3 milliGRAM(s) Oral at bedtime PRN  methocarbamol 500 milliGRAM(s) Oral two times a day  multivitamin 1 Tablet(s) Oral daily  nystatin Powder 1 Application(s) Topical three times a day  ondansetron Injectable 4 milliGRAM(s) IV Push every 8 hours PRN  pantoprazole  Injectable 40 milliGRAM(s) IV Push daily  piperacillin/tazobactam IVPB.. 3.375 Gram(s) IV Intermittent every 8 hours  polyethylene glycol 3350 17 Gram(s) Oral every 12 hours  povidone iodine 10% Solution 1 Application(s) Topical daily  senna 3 Tablet(s) Oral at bedtime  simethicone 80 milliGRAM(s) Chew every 6 hours  sodium chloride 0.9% lock flush 10 milliLiter(s) IV Push every 1 hour PRN  sodium chloride 7% Inhalation 4 milliLiter(s) Inhalation every 12 hours  trimethoprim  160 mG/sulfamethoxazole 800 mG 2 Tablet(s) Oral two times a day      Review of Systems:  unable to obtain    Allergies: codeine (Hives)    For details regarding the patient's past medical history, social history, family history, and other miscellaneous elements, please refer the initial infectious diseases consultation and/or the admitting history and physical examination for this admission.    Objective:  Vitals:   T(C): 36.1 (08-22-22 @ 08:23), Max: 37.1 (08-21-22 @ 12:27)  HR: 93 (08-22-22 @ 10:21) (64 - 93)  BP: 119/63 (08-22-22 @ 10:00) (87/49 - 160/78)  RR: 28 (08-22-22 @ 10:00) (14 - 36)  SpO2: 94% (08-22-22 @ 10:21) (78% - 100%)    Physical Examination:  General: no acute distress  HEENT: NC/AT  Neck: trach  Cardio: RRR  Resp: MV  breath sounds  Abd: soft, NT, ND  Neuro: no obvious focal deficits  Ext: no edema or cyanosis  Skin: warm, dry, no visible rash      Laboratory Studies:  CBC:                       7.5    7.53  )-----------( 285      ( 22 Aug 2022 06:35 )             24.5     CMP: 08-22    135  |  102  |  37<H>  ----------------------------<  143<H>  5.2   |  30  |  1.33<H>    Ca    8.3<L>      22 Aug 2022 06:35    TPro  6.4  /  Alb  1.7<L>  /  TBili  0.2  /  DBili  x   /  AST  13  /  ALT  12  /  AlkPhos  105  08-22    LIVER FUNCTIONS - ( 22 Aug 2022 06:35 )  Alb: 1.7 g/dL / Pro: 6.4 g/dL / ALK PHOS: 105 U/L / ALT: 12 U/L DA / AST: 13 U/L / GGT: x               Microbiology: reviewed    Culture - Sputum (collected 08-19-22 @ 20:45)  Source: ET Tube ET Tube  Gram Stain (08-20-22 @ 03:35):    Few polymorphonuclear leukocytes per low power field    Rare Squamous epithelial cells per low power field    Few Gram Negative Rods per oil power field  Preliminary Report (08-20-22 @ 19:12):    Normal Respiratory Elvira present    Culture - Urine (collected 08-18-22 @ 20:00)  Source: Clean Catch Clean Catch (Midstream)  Final Report (08-21-22 @ 21:14):    10,000 - 49,000 CFU/mL Candida albicans "Susceptibilities not performed"    Culture - Blood (collected 08-18-22 @ 19:14)  Source: .Blood Blood-Peripheral  Preliminary Report (08-20-22 @ 01:02):    No growth to date.    Culture - Blood (collected 08-18-22 @ 18:45)  Source: .Blood Blood-Peripheral  Preliminary Report (08-20-22 @ 01:02):    No growth to date.        Radiology: reviewed  < from: CT Abdomen and Pelvis No Cont (08.20.22 @ 12:17) >    ACC: [id number] EXAM:  CT ABDOMEN AND PELVIS                          PROCEDURE DATE:  08/20/2022          INTERPRETATION:  CLINICAL INFORMATION: Sepsis, possible pneumonia,   evaluate intra-abdominal pathology.    COMPARISON: CT chest 8/18/2022. CT abdomen pelvis 4/21/2022.    CONTRAST/COMPLICATIONS:  IV Contrast: None  Oral Contrast: None  Complications: None reported    PROCEDURE:  CT of the Abdomen and Pelvis was performed.  Sagittal and coronal reformats were performed.    FINDINGS:  Evaluation of the solid organs and vasculature is limited without   intravenous contrast. Motion degraded exam.    LOWER CHEST: Redemonstration moderate bilateral pleural effusions, left   greater than right.    LIVER: Within normal limits.  BILE DUCTS: Normal caliber.  GALLBLADDER: Within normal limits.  SPLEEN: Within normal limits.  PANCREAS: Within normal limits.  ADRENALS: Within normal limits.  KIDNEYS/URETERS: Within normal limits.    BLADDER: Woody catheter.  REPRODUCTIVE ORGANS: Uterus and adnexa within normal limits.    BOWEL: No bowel obstruction. Appendix is not visualized. Scattered   colonic diverticula without evidence of acute diverticulitis. Gastrostomy   tube in the stomach.  PERITONEUM: No ascites.  VESSELS: Atherosclerotic changes.  RETROPERITONEUM/LYMPH NODES: No lymphadenopathy.  ABDOMINAL WALL: Within normal limits.  BONES: Degenerative changes. Chronic fracture deformity of the partially   imaged left femur, unchanged.    IMPRESSION:  No acute intra-abdominal pathology within limitations of noncontrast exam.    Redemonstrated moderate bilateral pleural effusions.        --- End of Report ---            ROBERT BLOUNT MD; Attending Radiologist  This document has been electronically signed. Aug 20 2022 12:59PM    < end of copied text >

## 2022-08-23 LAB
ANION GAP SERPL CALC-SCNC: 8 MMOL/L — SIGNIFICANT CHANGE UP (ref 5–17)
BUN SERPL-MCNC: 30 MG/DL — HIGH (ref 7–23)
CALCIUM SERPL-MCNC: 8.8 MG/DL — SIGNIFICANT CHANGE UP (ref 8.4–10.5)
CHLORIDE SERPL-SCNC: 101 MMOL/L — SIGNIFICANT CHANGE UP (ref 96–108)
CO2 SERPL-SCNC: 26 MMOL/L — SIGNIFICANT CHANGE UP (ref 22–31)
CREAT SERPL-MCNC: 1.32 MG/DL — HIGH (ref 0.5–1.3)
EGFR: 41 ML/MIN/1.73M2 — LOW
GLUCOSE SERPL-MCNC: 158 MG/DL — HIGH (ref 70–99)
HCT VFR BLD CALC: 29.9 % — LOW (ref 34.5–45)
HGB BLD-MCNC: 8.9 G/DL — LOW (ref 11.5–15.5)
MCHC RBC-ENTMCNC: 26.2 PG — LOW (ref 27–34)
MCHC RBC-ENTMCNC: 29.8 GM/DL — LOW (ref 32–36)
MCV RBC AUTO: 87.9 FL — SIGNIFICANT CHANGE UP (ref 80–100)
NRBC # BLD: 0 /100 WBCS — SIGNIFICANT CHANGE UP (ref 0–0)
PLATELET # BLD AUTO: 391 K/UL — SIGNIFICANT CHANGE UP (ref 150–400)
POTASSIUM SERPL-MCNC: 5.4 MMOL/L — HIGH (ref 3.5–5.3)
POTASSIUM SERPL-SCNC: 5.4 MMOL/L — HIGH (ref 3.5–5.3)
RBC # BLD: 3.4 M/UL — LOW (ref 3.8–5.2)
RBC # FLD: 16.7 % — HIGH (ref 10.3–14.5)
SODIUM SERPL-SCNC: 135 MMOL/L — SIGNIFICANT CHANGE UP (ref 135–145)
WBC # BLD: 10.48 K/UL — SIGNIFICANT CHANGE UP (ref 3.8–10.5)
WBC # FLD AUTO: 10.48 K/UL — SIGNIFICANT CHANGE UP (ref 3.8–10.5)

## 2022-08-23 PROCEDURE — 99233 SBSQ HOSP IP/OBS HIGH 50: CPT

## 2022-08-23 PROCEDURE — 74018 RADEX ABDOMEN 1 VIEW: CPT | Mod: 26

## 2022-08-23 RX ORDER — FUROSEMIDE 40 MG
20 TABLET ORAL DAILY
Refills: 0 | Status: DISCONTINUED | OUTPATIENT
Start: 2022-08-24 | End: 2022-08-26

## 2022-08-23 RX ORDER — FENTANYL CITRATE 50 UG/ML
50 INJECTION INTRAVENOUS ONCE
Refills: 0 | Status: DISCONTINUED | OUTPATIENT
Start: 2022-08-23 | End: 2022-08-23

## 2022-08-23 RX ADMIN — Medication 1 TABLET(S): at 12:41

## 2022-08-23 RX ADMIN — Medication 2 MILLIGRAM(S): at 05:22

## 2022-08-23 RX ADMIN — NYSTATIN CREAM 1 APPLICATION(S): 100000 CREAM TOPICAL at 05:22

## 2022-08-23 RX ADMIN — INSULIN GLARGINE 8 UNIT(S): 100 INJECTION, SOLUTION SUBCUTANEOUS at 08:40

## 2022-08-23 RX ADMIN — HEPARIN SODIUM 5000 UNIT(S): 5000 INJECTION INTRAVENOUS; SUBCUTANEOUS at 05:22

## 2022-08-23 RX ADMIN — Medication 1 APPLICATION(S): at 12:42

## 2022-08-23 RX ADMIN — PIPERACILLIN AND TAZOBACTAM 25 GRAM(S): 4; .5 INJECTION, POWDER, LYOPHILIZED, FOR SOLUTION INTRAVENOUS at 08:36

## 2022-08-23 RX ADMIN — SIMETHICONE 80 MILLIGRAM(S): 80 TABLET, CHEWABLE ORAL at 12:41

## 2022-08-23 RX ADMIN — FENTANYL CITRATE 25 MICROGRAM(S): 50 INJECTION INTRAVENOUS at 06:31

## 2022-08-23 RX ADMIN — Medication 3 MILLILITER(S): at 20:31

## 2022-08-23 RX ADMIN — METHOCARBAMOL 500 MILLIGRAM(S): 500 TABLET, FILM COATED ORAL at 05:22

## 2022-08-23 RX ADMIN — METHOCARBAMOL 500 MILLIGRAM(S): 500 TABLET, FILM COATED ORAL at 17:07

## 2022-08-23 RX ADMIN — Medication 2: at 12:55

## 2022-08-23 RX ADMIN — Medication 3 MILLILITER(S): at 13:42

## 2022-08-23 RX ADMIN — SENNA PLUS 3 TABLET(S): 8.6 TABLET ORAL at 21:54

## 2022-08-23 RX ADMIN — PANTOPRAZOLE SODIUM 40 MILLIGRAM(S): 20 TABLET, DELAYED RELEASE ORAL at 12:41

## 2022-08-23 RX ADMIN — SODIUM CHLORIDE 4 MILLILITER(S): 9 INJECTION INTRAMUSCULAR; INTRAVENOUS; SUBCUTANEOUS at 20:32

## 2022-08-23 RX ADMIN — Medication 4: at 18:01

## 2022-08-23 RX ADMIN — NYSTATIN CREAM 1 APPLICATION(S): 100000 CREAM TOPICAL at 21:54

## 2022-08-23 RX ADMIN — CHLORHEXIDINE GLUCONATE 15 MILLILITER(S): 213 SOLUTION TOPICAL at 05:22

## 2022-08-23 RX ADMIN — SODIUM CHLORIDE 4 MILLILITER(S): 9 INJECTION INTRAMUSCULAR; INTRAVENOUS; SUBCUTANEOUS at 07:59

## 2022-08-23 RX ADMIN — Medication 2 TABLET(S): at 05:22

## 2022-08-23 RX ADMIN — Medication 3 MILLILITER(S): at 01:14

## 2022-08-23 RX ADMIN — Medication 2 MILLIGRAM(S): at 17:07

## 2022-08-23 RX ADMIN — SIMETHICONE 80 MILLIGRAM(S): 80 TABLET, CHEWABLE ORAL at 05:22

## 2022-08-23 RX ADMIN — Medication 2 MILLIGRAM(S): at 21:54

## 2022-08-23 RX ADMIN — SIMETHICONE 80 MILLIGRAM(S): 80 TABLET, CHEWABLE ORAL at 17:08

## 2022-08-23 RX ADMIN — FENTANYL CITRATE 50 MICROGRAM(S): 50 INJECTION INTRAVENOUS at 17:07

## 2022-08-23 RX ADMIN — Medication 3 MILLILITER(S): at 07:59

## 2022-08-23 RX ADMIN — Medication 2 MILLIGRAM(S): at 09:06

## 2022-08-23 RX ADMIN — CHLORHEXIDINE GLUCONATE 15 MILLILITER(S): 213 SOLUTION TOPICAL at 17:08

## 2022-08-23 RX ADMIN — Medication 1 MILLIGRAM(S): at 12:41

## 2022-08-23 RX ADMIN — HEPARIN SODIUM 5000 UNIT(S): 5000 INJECTION INTRAVENOUS; SUBCUTANEOUS at 17:07

## 2022-08-23 RX ADMIN — Medication 2 MILLIGRAM(S): at 01:02

## 2022-08-23 RX ADMIN — Medication 2 MILLIGRAM(S): at 13:25

## 2022-08-23 RX ADMIN — PIPERACILLIN AND TAZOBACTAM 25 GRAM(S): 4; .5 INJECTION, POWDER, LYOPHILIZED, FOR SOLUTION INTRAVENOUS at 15:46

## 2022-08-23 RX ADMIN — Medication 2 TABLET(S): at 17:08

## 2022-08-23 RX ADMIN — NYSTATIN CREAM 1 APPLICATION(S): 100000 CREAM TOPICAL at 13:25

## 2022-08-23 RX ADMIN — CHLORHEXIDINE GLUCONATE 1 APPLICATION(S): 213 SOLUTION TOPICAL at 12:42

## 2022-08-23 RX ADMIN — Medication 2: at 05:23

## 2022-08-23 RX ADMIN — FENTANYL CITRATE 25 MICROGRAM(S): 50 INJECTION INTRAVENOUS at 16:18

## 2022-08-23 RX ADMIN — Medication 1 APPLICATION(S): at 12:43

## 2022-08-23 NOTE — CHART NOTE - NSCHARTNOTEFT_GEN_A_CORE
Assessment: Per H&P, "76F with dementia, COPD with chronic resp failure and is vent dependent, s/p PEG, hx of cardiac arrest, CHF, cor pulmonale, CVA, COVID-19 infxn, CKD, anemia, multiple admissions for respiratory distress (last admission from 6/1-6/9 diagnosed with PNA with parapneumonic pleural effusion s/p thoracentesis and Avycaz) who presents from [facility] for respiratory distress again.   Likely with repeat PNA especially given new CXR findings.   Meets severe sepsis criteria with tachypnea + leukocytosis + lactic acid + source of infection."    8/23  Pt remains on vent, due for follow-up. Observed tube feed Glucerna 1.5 at goal rate of 45ml/hr. No GI issue reported, last BM today, bowel regimen in place. Pertinent medications/nutrition labs reviewed; elevated BUN, Cr, potassium. glucose, -161 (improved). Noted ordered for diuretic, continues with multiple antibiotic and fentanyl, ordered for lantus, admelog, multivitamin and folic acid. Recommend to continue with current diet order while remains on ventilator. Will recommend to increase feeding rate when pt off ventilator, RD remains available.     Factors impacting intake: [ ] none [ ] nausea  [ ] vomiting [ ] diarrhea [ ] constipation  [ ]chewing problems [ ] swallowing issues  [ x] other: NPO with tube feed    Diet Prescription: Diet, NPO with Tube Feed:   Tube Feeding Modality: Gastrostomy  Glucerna 1.5 Horacio  Total Volume for 24 Hours (mL): 900  Continuous  Starting Tube Feed Rate {mL per Hour}: 45  Until Goal Tube Feed Rate (mL per Hour): 45  Tube Feed Duration (in Hours): 20  Tube Feed Start Time: 00:00  Free Water Flush  Bolus   Total Volume per Flush (mL): 200   Frequency: Every 6 Hours   Total Daily Volume of Flush (mL): 800    Start Time: 00:00 (08-19-22 @ 13:25)    Intake: pt received 111% estimated needs past 4 days, exceed estimated needs. Per flowsheets, feeds running 24hr, ordered for 20hr. Made RN aware.  (8/22): 1080 ml (120% EN volume)   (8/21): 1080 ml (120% EN volume)  (8/20): 1080 ml (120% EN volume)  (8/19): 760 ml (84% EN volume)    Current Weight: Weight (kg): 54.8 (8-18); 48.3 (8-21); 51.8 (8-22)  5.5% Weight loss in 1 week is clinically significant, wt fluctuated, noted received diuretic, IVF in house. Previous adm weight of 54 kg x2 month.    Pertinent Medications: MEDICATIONS  (STANDING):  albuterol/ipratropium for Nebulization 3 milliLiter(s) Nebulizer every 6 hours  chlorhexidine 0.12% Liquid 15 milliLiter(s) Oral Mucosa every 12 hours  chlorhexidine 2% Cloths 1 Application(s) Topical daily  collagenase Ointment 1 Application(s) Topical daily  dextrose 5%. 1000 milliLiter(s) (50 mL/Hr) IV Continuous <Continuous>  dextrose 5%. 1000 milliLiter(s) (100 mL/Hr) IV Continuous <Continuous>  dextrose 50% Injectable 25 Gram(s) IV Push once  dextrose 50% Injectable 12.5 Gram(s) IV Push once  dextrose 50% Injectable 25 Gram(s) IV Push once  folic acid 1 milliGRAM(s) Oral daily  glucagon  Injectable 1 milliGRAM(s) IntraMuscular once  heparin   Injectable 5000 Unit(s) SubCutaneous every 12 hours  insulin glargine Injectable (LANTUS) 8 Unit(s) SubCutaneous every morning  insulin lispro (ADMELOG) corrective regimen sliding scale   SubCutaneous every 6 hours  lactobacillus acidophilus 1 Tablet(s) Oral daily  LORazepam     Tablet 2 milliGRAM(s) Oral every 4 hours  methocarbamol 500 milliGRAM(s) Oral two times a day  multivitamin 1 Tablet(s) Oral daily  nystatin Powder 1 Application(s) Topical three times a day  pantoprazole  Injectable 40 milliGRAM(s) IV Push daily  piperacillin/tazobactam IVPB.. 3.375 Gram(s) IV Intermittent every 8 hours  polyethylene glycol 3350 17 Gram(s) Oral every 12 hours  povidone iodine 10% Solution 1 Application(s) Topical daily  senna 3 Tablet(s) Oral at bedtime  simethicone 80 milliGRAM(s) Chew every 6 hours  sodium chloride 7% Inhalation 4 milliLiter(s) Inhalation every 12 hours  trimethoprim  160 mG/sulfamethoxazole 800 mG 2 Tablet(s) Oral two times a day    MEDICATIONS  (PRN):  acetaminophen     Tablet .. 650 milliGRAM(s) Oral every 6 hours PRN Temp greater or equal to 38C (100.4F), Mild Pain (1 - 3)  aluminum hydroxide/magnesium hydroxide/simethicone Suspension 30 milliLiter(s) Oral every 4 hours PRN Dyspepsia  dextrose Oral Gel 15 Gram(s) Oral once PRN Blood Glucose LESS THAN 70 milliGRAM(s)/deciliter  fentaNYL    Injectable 25 MICROGram(s) IV Push every 4 hours PRN Moderate Pain (4 - 6), agitation  melatonin 3 milliGRAM(s) Oral at bedtime PRN Insomnia  ondansetron Injectable 4 milliGRAM(s) IV Push every 8 hours PRN Nausea and/or Vomiting  sodium chloride 0.9% lock flush 10 milliLiter(s) IV Push every 1 hour PRN Pre/post blood products, medications, blood draw, and to maintain line patency    Pertinent Labs: 08-23 Na135 mmol/L Glu 158 mg/dL<H> K+ 5.4 mmol/L<H> Cr  1.32 mg/dL<H> BUN 30 mg/dL<H> 08-19 Phos 3.1 mg/dL 08-22 Alb 1.7 g/dL<L>     CAPILLARY BLOOD GLUCOSE      POCT Blood Glucose.: 161 mg/dL (23 Aug 2022 05:17)  POCT Blood Glucose.: 162 mg/dL (22 Aug 2022 23:56)  POCT Blood Glucose.: 161 mg/dL (22 Aug 2022 18:00)    Skin: no edema noted,   Pressure Injury 1: right buttock, none  Pressure Injury 2: Left:,first toe, Unstageable  Pressure Injury 3: first toe, Unstageable    Estimated Needs: The Ziyad State Equation (PSU) 2003b was used to calculate resting energy expenditure: 1116kcal/day  [x ] no change since previous assessment  [ ] recalculated:     Previous Nutrition Diagnosis:   [ ] Inadequate Energy Intake [ ]Inadequate Oral Intake [ ] Excessive Energy Intake   [ ] Underweight [ ] Increased Nutrient Needs [ ] Overweight/Obesity   [ x] Altered nutrition related lab value [ ] Unintended Weight Loss [ ] Food & Nutrition Related Knowledge Deficit [ ] Malnutrition     Nutrition Diagnosis is [ x] ongoing  [ ] resolved [ ] not applicable     New Nutrition Diagnosis: Acute severe malnutrition related to inability to consume sufficient protein/energy secondary to catabolism as evidenced by >2% wt loss in 1 week, moderate-severe muscle loss    Interventions: if remains on ventilator, continue feed rate of 45ml/hr x20hrs to provide 900ml total volume, 1350kcal, 74g pro, 683ml free water. Recommend additional free water 200ml q 6hr.  Recommend  [ ] Change Diet To:  [ ] Nutrition Supplement  [ x] Nutrition Support  [x ] Other: if off ventilator, increase feed rate to 60ml/hr x20hrs to provide 1200ml total volume, 1800kcal, 99g pro, 911ml free water. Recommend additional free water 200ml q 6hr.    Monitoring and Evaluation:   [X ] PO intake [ x ] Tolerance to diet prescription [ x ] weights [ x ] labs[ x ] follow up per protocol  [ ] other: Assessment: Per H&P, "76F with dementia, COPD with chronic resp failure and is vent dependent, s/p PEG, hx of cardiac arrest, CHF, cor pulmonale, CVA, COVID-19 infxn, CKD, anemia, multiple admissions for respiratory distress (last admission from 6/1-6/9 diagnosed with PNA with parapneumonic pleural effusion s/p thoracentesis and Avycaz) who presents from [facility] for respiratory distress again.   Likely with repeat PNA especially given new CXR findings.   Meets severe sepsis criteria with tachypnea + leukocytosis + lactic acid + source of infection."    8/23  Pt remains on vent, due for follow-up. Observed tube feed Glucerna 1.5 at goal rate of 45ml/hr. No GI issue reported, last BM today, bowel regimen in place. Pertinent medications/nutrition labs reviewed; elevated BUN, Cr, potassium. glucose, -161 (improved). Noted ordered for diuretic, continues with multiple antibiotic and fentanyl, ordered for lantus, admelog, multivitamin and folic acid. Recommend to continue with current diet order while remains on ventilator. Will recommend to increase feeding rate when pt off ventilator, RD remains available.     Factors impacting intake: [ ] none [ ] nausea  [ ] vomiting [ ] diarrhea [ ] constipation  [ ]chewing problems [ ] swallowing issues  [ x] other: NPO with tube feed    Diet Prescription: Diet, NPO with Tube Feed:   Tube Feeding Modality: Gastrostomy  Glucerna 1.5 Horacio  Total Volume for 24 Hours (mL): 900  Continuous  Starting Tube Feed Rate {mL per Hour}: 45  Until Goal Tube Feed Rate (mL per Hour): 45  Tube Feed Duration (in Hours): 20  Tube Feed Start Time: 00:00  Free Water Flush  Bolus   Total Volume per Flush (mL): 200   Frequency: Every 6 Hours   Total Daily Volume of Flush (mL): 800    Start Time: 00:00 (08-19-22 @ 13:25)    Intake: pt received 111% estimated needs past 4 days, exceed estimated needs. Per flowsheets, feeds running 24hr, ordered for 20hr. Made RN aware. Per discussion with RN, feeds held when 200cc water flush q6hr. Advised to run free water 200cc in duration of 1 hr, feeds duration will be 20hrs.   (8/22): 1080 ml (120% EN volume)   (8/21): 1080 ml (120% EN volume)  (8/20): 1080 ml (120% EN volume)  (8/19): 760 ml (84% EN volume)    Current Weight: Weight (kg): 54.8 (8-18); 48.3 (8-21); 51.8 (8-22)  5.5% Weight loss in 1 week is clinically significant, wt fluctuated, noted received diuretic, IVF in house. Previous adm weight of 54 kg x2 month.    Pertinent Medications: MEDICATIONS  (STANDING):  albuterol/ipratropium for Nebulization 3 milliLiter(s) Nebulizer every 6 hours  chlorhexidine 0.12% Liquid 15 milliLiter(s) Oral Mucosa every 12 hours  chlorhexidine 2% Cloths 1 Application(s) Topical daily  collagenase Ointment 1 Application(s) Topical daily  dextrose 5%. 1000 milliLiter(s) (50 mL/Hr) IV Continuous <Continuous>  dextrose 5%. 1000 milliLiter(s) (100 mL/Hr) IV Continuous <Continuous>  dextrose 50% Injectable 25 Gram(s) IV Push once  dextrose 50% Injectable 12.5 Gram(s) IV Push once  dextrose 50% Injectable 25 Gram(s) IV Push once  folic acid 1 milliGRAM(s) Oral daily  glucagon  Injectable 1 milliGRAM(s) IntraMuscular once  heparin   Injectable 5000 Unit(s) SubCutaneous every 12 hours  insulin glargine Injectable (LANTUS) 8 Unit(s) SubCutaneous every morning  insulin lispro (ADMELOG) corrective regimen sliding scale   SubCutaneous every 6 hours  lactobacillus acidophilus 1 Tablet(s) Oral daily  LORazepam     Tablet 2 milliGRAM(s) Oral every 4 hours  methocarbamol 500 milliGRAM(s) Oral two times a day  multivitamin 1 Tablet(s) Oral daily  nystatin Powder 1 Application(s) Topical three times a day  pantoprazole  Injectable 40 milliGRAM(s) IV Push daily  piperacillin/tazobactam IVPB.. 3.375 Gram(s) IV Intermittent every 8 hours  polyethylene glycol 3350 17 Gram(s) Oral every 12 hours  povidone iodine 10% Solution 1 Application(s) Topical daily  senna 3 Tablet(s) Oral at bedtime  simethicone 80 milliGRAM(s) Chew every 6 hours  sodium chloride 7% Inhalation 4 milliLiter(s) Inhalation every 12 hours  trimethoprim  160 mG/sulfamethoxazole 800 mG 2 Tablet(s) Oral two times a day    MEDICATIONS  (PRN):  acetaminophen     Tablet .. 650 milliGRAM(s) Oral every 6 hours PRN Temp greater or equal to 38C (100.4F), Mild Pain (1 - 3)  aluminum hydroxide/magnesium hydroxide/simethicone Suspension 30 milliLiter(s) Oral every 4 hours PRN Dyspepsia  dextrose Oral Gel 15 Gram(s) Oral once PRN Blood Glucose LESS THAN 70 milliGRAM(s)/deciliter  fentaNYL    Injectable 25 MICROGram(s) IV Push every 4 hours PRN Moderate Pain (4 - 6), agitation  melatonin 3 milliGRAM(s) Oral at bedtime PRN Insomnia  ondansetron Injectable 4 milliGRAM(s) IV Push every 8 hours PRN Nausea and/or Vomiting  sodium chloride 0.9% lock flush 10 milliLiter(s) IV Push every 1 hour PRN Pre/post blood products, medications, blood draw, and to maintain line patency    Pertinent Labs: 08-23 Na135 mmol/L Glu 158 mg/dL<H> K+ 5.4 mmol/L<H> Cr  1.32 mg/dL<H> BUN 30 mg/dL<H> 08-19 Phos 3.1 mg/dL 08-22 Alb 1.7 g/dL<L>     CAPILLARY BLOOD GLUCOSE      POCT Blood Glucose.: 161 mg/dL (23 Aug 2022 05:17)  POCT Blood Glucose.: 162 mg/dL (22 Aug 2022 23:56)  POCT Blood Glucose.: 161 mg/dL (22 Aug 2022 18:00)    Skin: no edema noted,   Pressure Injury 1: right buttock, none  Pressure Injury 2: Left:,first toe, Unstageable  Pressure Injury 3: first toe, Unstageable    Estimated Needs: The Ziyad State Equation (PSU) 2003b was used to calculate resting energy expenditure: 1116kcal/day  [x ] no change since previous assessment  [ ] recalculated:     Previous Nutrition Diagnosis:   [ ] Inadequate Energy Intake [ ]Inadequate Oral Intake [ ] Excessive Energy Intake   [ ] Underweight [ ] Increased Nutrient Needs [ ] Overweight/Obesity   [ x] Altered nutrition related lab value [ ] Unintended Weight Loss [ ] Food & Nutrition Related Knowledge Deficit [ ] Malnutrition     Nutrition Diagnosis is [ x] ongoing  [ ] resolved [ ] not applicable     New Nutrition Diagnosis: Acute severe malnutrition related to inability to consume sufficient protein/energy secondary to catabolism as evidenced by >2% wt loss in 1 week, moderate-severe muscle loss    Interventions: if remains on ventilator, continue feed rate of 45ml/hr x20hrs to provide 900ml total volume, 1350kcal, 74g pro, 683ml free water. Recommend additional free water 200ml q 6hr.  Recommend  [ ] Change Diet To:  [ ] Nutrition Supplement  [ x] Nutrition Support  [x ] Other: if off ventilator, increase feed rate to 60ml/hr x20hrs to provide 1200ml total volume, 1800kcal, 99g pro, 911ml free water. Recommend additional free water 200ml q 6hr.    Monitoring and Evaluation:   [X ] PO intake [ x ] Tolerance to diet prescription [ x ] weights [ x ] labs[ x ] follow up per protocol  [ ] other:

## 2022-08-23 NOTE — PROGRESS NOTE ADULT - SUBJECTIVE AND OBJECTIVE BOX
Chief Complaint: Respiratory distress    Interval Events: No events overnight.    Review of Systems:  Unable to obtain    Physical Exam:  Vitals:        Vital Signs Last 24 Hrs  T(C): 36.6 (23 Aug 2022 06:14), Max: 36.8 (22 Aug 2022 16:14)  T(F): 97.8 (23 Aug 2022 06:14), Max: 98.2 (22 Aug 2022 16:14)  HR: 72 (23 Aug 2022 11:00) (65 - 94)  BP: 96/58 (23 Aug 2022 11:00) (84/48 - 149/66)  BP(mean): 70 (23 Aug 2022 11:00) (61 - 89)  RR: 22 (23 Aug 2022 11:00) (16 - 33)  SpO2: 100% (23 Aug 2022 11:00) (97% - 100%)  Parameters below as of 23 Aug 2022 08:00  Patient On (Oxygen Delivery Method): ventilator  O2 Concentration (%): 60  General: Unresponsive  HEENT: Intubated  Neck: No JVD, no carotid bruit  Lungs: CTAB  CV: RRR, nl S1/S2, no M/R/G  Abdomen: S/NT/ND, +BS  Extremities: No LE edema, no cyanosis  Neuro: AAOx0  Skin: No rash    Labs:                        8.9    10.48 )-----------( 391      ( 23 Aug 2022 06:00 )             29.9     08-23    135  |  101  |  30<H>  ----------------------------<  158<H>  5.4<H>   |  26  |  1.32<H>    Ca    8.8      23 Aug 2022 06:00    TPro  6.4  /  Alb  1.7<L>  /  TBili  0.2  /  DBili  x   /  AST  13  /  ALT  12  /  AlkPhos  105  08-22        PT/INR - ( 22 Aug 2022 06:35 )   PT: 14.0 sec;   INR: 1.21 ratio             Telemetry: Sinus rhythm

## 2022-08-23 NOTE — DIETITIAN NUTRITION RISK NOTIFICATION - TREATMENT: THE FOLLOWING DIET HAS BEEN RECOMMENDED
Diet, NPO with Tube Feed:   Tube Feeding Modality: Gastrostomy  Glucerna 1.5 Horacio  Total Volume for 24 Hours (mL): 900  Continuous  Starting Tube Feed Rate {mL per Hour}: 45  Until Goal Tube Feed Rate (mL per Hour): 45  Tube Feed Duration (in Hours): 20  Tube Feed Start Time: 00:00  Free Water Flush  Bolus   Total Volume per Flush (mL): 200   Frequency: Every 6 Hours   Total Daily Volume of Flush (mL): 800    Start Time: 00:00 (08-19-22 @ 13:25) [Active]

## 2022-08-23 NOTE — PROGRESS NOTE ADULT - ASSESSMENT
a/p distention  abd pain  peg    plan  peg plan and dressing changes  in and out  electrolytes  check  f/u axr  continue peg feedings    to follow up    Advanced care planning was discussed with patient and family.  Advanced care planning forms were reviewed and discussed.  Risks, benefits and alternatives of gastroenterologic procedures were discussed in detail and all questions were answered.    30 minutes spent.

## 2022-08-23 NOTE — PROGRESS NOTE ADULT - ASSESSMENT
76F with dementia, COPD with chronic resp failure and is vent dependent, s/p PEG, hx of cardiac arrest, CHF, cor pulmonale, CVA, anemia, multiple admissions for respiratory distress who presents from [facility] for respiratory distress again.     Sepsis 2/2 PNA  Acute on chronic RF  B/l pleural effusions  - most recent cultures w/ serratia and CRE pseudomonas  - imaging reviewed  - cx reviewed. BCx NGTD. UCx Candida. RCx NOF  Plan  C/w Zosyn  C/w bactrim  Plan for x7 day course  Supportive care, O2 and pulm toilet  trend temps/ wbc    Infectious Diseases will continue to follow. Please call with any questions.   Andressa Brantley M.D.  [facility], Division of Infectious Diseases [phone]

## 2022-08-23 NOTE — PROGRESS NOTE ADULT - ASSESSMENT
75yo F with PMH of dementia, COPD with chronic resp failure and is vent dependent, s/p PEG, hx of cardiac arrest, diastolic CHF, cor pulmonale, hx of CVA, COVID-19 infxn, CKD, anemia, multiple admissions for respiratory distress (recent admission from 6/1-6/9 diagnosed with PNA with parapneumonic pleural effusion s/p thoracentesis and Avycaz) who presents from [facility] for respiratory distress again a/w sepsis and respiratory failure due to suspected recurrent gram-negative PNA.    Severe sepsis and acute hypoxic respiratory failure 2/2 gram-negative PNA   - continue current vent settings (on AC with RR 16, , PEEP 5, FiO2 100%) maintain O2 sat >92%  - was on ertapenem, as per ID changed to zosyn+bactrim for coverage of prior admissions cultures of MDR Serratia and CRE Pseudomonas  - lactate downtrending, procalcitonin high but improving with treatment (4.76 -> 3.34 -> 1.77)  - cautious use of fluids given hx of CHF (received 1750cc of NS in ED)  - f/u blood cultures (NGTD), urine culture pending   - f/u trach sputum culture - has GNR growing awaiting speciation / sensitivities  - has hx of respiratory distress driven by vent asynchrony and would require IV benzos ; trialed IV fentanyl with adequate effect this evening  - Checked CT Abd/P to eval for any other potential source sign of infection and found no significant sign of intra-abdominal infection on CT  - cc/pulm consult (Jaime), recs appreciated  - ID ([facility]), recs appreciated  - palliative care (Emre), recs appreciated - pt is full code    Diastolic CHF  B/L pleural effusions  - likely is a component of pulmonary edema given hx of CHF, given lasix 40mg IV x1 post-transfusion  - last TTE was 5/30 with EF 65% with normal LVSF, dilated RV, and moderate pHTN -> repeat TTE appears unchanged  - cardiology consult (Deepak), recs appreciated -rec to hold diuretic for today  - may need repeat thoracentesis (had 1.5L drained two admissions ago), pulmonology consulted; was parapneumonic on that admission as opposed to transudative and thus less likely related to CHF    PEG tube leak  - Nursing reported small amount of tube feed leak around the PEG tube entry site.   - Consulted GI (Coral), recs appreciated  - pt did have tube replaced on a recent admission    Anemia of chronic disease  - received 1 unit of pRBC on 8/19 for Hgb of 7.0 with appropriate response in Hgb -- now Hgb with downtrend to 7.5  - has been evaluated by GI and hematology in the past -> dx with ACD with intermittent GI bleeding  - negative occult blood   - monitor H&H, if continues to trend down - will give another transfusion  - pt may need Retacrit -- Cr is 1.3 with eGFR of 41, but given pt has low muscle mass from being bedbound for years, this GFR estimate is likely falsely high  - consider nephro eval to assess if appropriate to give Retacrit    Hyperkalemia  - given both insulin and lasix   - resolved; monitor BMP    Hx of CKD stage 3 - near baseline of 1.2-1.3  - renally dose medications and monitor BMP and electrolytes   - Cr is 1.3 with eGFR of 41, but given pt has low muscle mass from being bedbound for years, this GFR estimate is likely falsely high  - consider nephro eval     HTN  - not on any BP meds at facility  - monitor VS    DM2  - NPO with TF  - continue with insulin 8units qAM as per last admission  - c/w moderate insulin coverage scale  - goal -180    Diet  - continue with same TF from last admission    Preventive measures  - cont with heparin subq for DVT ppx  - Full Code

## 2022-08-23 NOTE — PROGRESS NOTE ADULT - SUBJECTIVE AND OBJECTIVE BOX
Date/Time Patient Seen:  		  Referring MD:   Data Reviewed	       Patient is a 76y old  Female who presents with a chief complaint of respiratory distress (22 Aug 2022 19:57)      Subjective/HPI     PAST MEDICAL & SURGICAL HISTORY:  Dementia of frontal lobe type    Aphasic stroke    Diabetes mellitus    Respiratory failure    Hypertension    GERD (gastroesophageal reflux disease)    Constipation    Respiratory failure    CVA (cerebral vascular accident)    HTN (hypertension)    DM (diabetes mellitus)    Advanced dementia    COVID-19 virus detected    Quadriplegia    Pneumonia    Type II diabetes mellitus    Hx of appendectomy    Gastrostomy in place    Tracheostomy in place    Tracheostomy tube present    Feeding by G-tube          Medication list         MEDICATIONS  (STANDING):  albuterol/ipratropium for Nebulization 3 milliLiter(s) Nebulizer every 6 hours  chlorhexidine 0.12% Liquid 15 milliLiter(s) Oral Mucosa every 12 hours  chlorhexidine 2% Cloths 1 Application(s) Topical daily  collagenase Ointment 1 Application(s) Topical daily  dextrose 5%. 1000 milliLiter(s) (50 mL/Hr) IV Continuous <Continuous>  dextrose 5%. 1000 milliLiter(s) (100 mL/Hr) IV Continuous <Continuous>  dextrose 50% Injectable 25 Gram(s) IV Push once  dextrose 50% Injectable 12.5 Gram(s) IV Push once  dextrose 50% Injectable 25 Gram(s) IV Push once  folic acid 1 milliGRAM(s) Oral daily  glucagon  Injectable 1 milliGRAM(s) IntraMuscular once  heparin   Injectable 5000 Unit(s) SubCutaneous every 12 hours  insulin glargine Injectable (LANTUS) 8 Unit(s) SubCutaneous every morning  insulin lispro (ADMELOG) corrective regimen sliding scale   SubCutaneous every 6 hours  lactobacillus acidophilus 1 Tablet(s) Oral daily  LORazepam     Tablet 2 milliGRAM(s) Oral every 4 hours  methocarbamol 500 milliGRAM(s) Oral two times a day  multivitamin 1 Tablet(s) Oral daily  nystatin Powder 1 Application(s) Topical three times a day  pantoprazole  Injectable 40 milliGRAM(s) IV Push daily  piperacillin/tazobactam IVPB.. 3.375 Gram(s) IV Intermittent every 8 hours  polyethylene glycol 3350 17 Gram(s) Oral every 12 hours  povidone iodine 10% Solution 1 Application(s) Topical daily  senna 3 Tablet(s) Oral at bedtime  simethicone 80 milliGRAM(s) Chew every 6 hours  sodium chloride 7% Inhalation 4 milliLiter(s) Inhalation every 12 hours  trimethoprim  160 mG/sulfamethoxazole 800 mG 2 Tablet(s) Oral two times a day    MEDICATIONS  (PRN):  acetaminophen     Tablet .. 650 milliGRAM(s) Oral every 6 hours PRN Temp greater or equal to 38C (100.4F), Mild Pain (1 - 3)  aluminum hydroxide/magnesium hydroxide/simethicone Suspension 30 milliLiter(s) Oral every 4 hours PRN Dyspepsia  dextrose Oral Gel 15 Gram(s) Oral once PRN Blood Glucose LESS THAN 70 milliGRAM(s)/deciliter  fentaNYL    Injectable 25 MICROGram(s) IV Push every 4 hours PRN Moderate Pain (4 - 6), agitation  melatonin 3 milliGRAM(s) Oral at bedtime PRN Insomnia  ondansetron Injectable 4 milliGRAM(s) IV Push every 8 hours PRN Nausea and/or Vomiting  sodium chloride 0.9% lock flush 10 milliLiter(s) IV Push every 1 hour PRN Pre/post blood products, medications, blood draw, and to maintain line patency         Vitals log        ICU Vital Signs Last 24 Hrs  T(C): 36.6 (23 Aug 2022 06:14), Max: 36.8 (22 Aug 2022 16:14)  T(F): 97.8 (23 Aug 2022 06:14), Max: 98.2 (22 Aug 2022 16:14)  HR: 89 (23 Aug 2022 06:00) (65 - 93)  BP: 133/64 (23 Aug 2022 06:00) (84/48 - 133/64)  BP(mean): 83 (23 Aug 2022 06:00) (61 - 85)  ABP: --  ABP(mean): --  RR: 32 (23 Aug 2022 06:00) (16 - 33)  SpO2: 99% (23 Aug 2022 06:00) (78% - 100%)    O2 Parameters below as of 22 Aug 2022 20:52  Patient On (Oxygen Delivery Method): ventilator             Mode: AC/ CMV (Assist Control/ Continuous Mandatory Ventilation)  RR (machine): 16  TV (machine): 400  FiO2: 40  PEEP: 5  ITime: 1  MAP: 15  PIP: 22      Input and Output:  I&O's Detail    22 Aug 2022 07:01  -  23 Aug 2022 07:00  --------------------------------------------------------  IN:    IV PiggyBack: 100 mL    Jevity 1.5: 585 mL  Total IN: 685 mL    OUT:    Indwelling Catheter - Urethral (mL): 1620 mL  Total OUT: 1620 mL    Total NET: -935 mL          Lab Data                        8.9    10.48 )-----------( 391      ( 23 Aug 2022 06:00 )             29.9     08-23    135  |  101  |  30<H>  ----------------------------<  158<H>  5.4<H>   |  26  |  1.32<H>    Ca    8.8      23 Aug 2022 06:00    TPro  6.4  /  Alb  1.7<L>  /  TBili  0.2  /  DBili  x   /  AST  13  /  ALT  12  /  AlkPhos  105  08-22            Review of Systems	      Objective     Physical Examination    heart s1s2  lung dec BS  abd soft      Pertinent Lab findings & Imaging      Annalise:  NO   Adequate UO     I&O's Detail    22 Aug 2022 07:01  -  23 Aug 2022 07:00  --------------------------------------------------------  IN:    IV PiggyBack: 100 mL    Jevity 1.5: 585 mL  Total IN: 685 mL    OUT:    Indwelling Catheter - Urethral (mL): 1620 mL  Total OUT: 1620 mL    Total NET: -935 mL               Discussed with:     Cultures:	        Radiology

## 2022-08-23 NOTE — PROGRESS NOTE ADULT - ASSESSMENT
77 yo f pmhx cardiac arrest, vdrf, dementia, cva, covid 19, ckd, anemia recurrent admissions for resp fx/pna/pleural effusion admitted with pna, acute on chronic hypoxic resp failure, pulm edema, chf exacerbation    NEURO: fent prn for vent synchrony, conitnue ativan  CV: continue lasix  RESP: acute on chornic hypoxic resp fx, 4-6 cc/kg tv lung protective strategies, actively titrating fio2/peep for spo2 >92%, chest pt, pulm lavage/suctioning, inh bronchodilators prn  RENAL: manasa avoid nephrotoxic meds, renally dose meds, trend urine output, bun/cr and electrolytes.  replace lytes as needed  GI: npo with tf  ENDO: lantus/iss for glycemic control   ID: zoysn and bactrim for pna  HEME: heparin for vte ppx   DISPO: full code.

## 2022-08-23 NOTE — PROGRESS NOTE ADULT - SUBJECTIVE AND OBJECTIVE BOX
[facility], Division of Infectious Diseases  MOIZ Lora Y. Patel, S. Shah, G. [facility]  [phone]    Name: BREA BECKHAM  Age: 76y  Gender: Female  MRN: [id number]    Interval History:  Patient seen and examined at bedside  No acute overnight events. Afebrile  Notes reviewed    Antibiotics:  piperacillin/tazobactam IVPB.. 3.375 Gram(s) IV Intermittent every 8 hours  trimethoprim  160 mG/sulfamethoxazole 800 mG 2 Tablet(s) Oral two times a day      Medications:  acetaminophen     Tablet .. 650 milliGRAM(s) Oral every 6 hours PRN  albuterol/ipratropium for Nebulization 3 milliLiter(s) Nebulizer every 6 hours  aluminum hydroxide/magnesium hydroxide/simethicone Suspension 30 milliLiter(s) Oral every 4 hours PRN  chlorhexidine 0.12% Liquid 15 milliLiter(s) Oral Mucosa every 12 hours  chlorhexidine 2% Cloths 1 Application(s) Topical daily  collagenase Ointment 1 Application(s) Topical daily  dextrose 5%. 1000 milliLiter(s) IV Continuous <Continuous>  dextrose 5%. 1000 milliLiter(s) IV Continuous <Continuous>  dextrose 50% Injectable 25 Gram(s) IV Push once  dextrose 50% Injectable 12.5 Gram(s) IV Push once  dextrose 50% Injectable 25 Gram(s) IV Push once  dextrose Oral Gel 15 Gram(s) Oral once PRN  fentaNYL    Injectable 25 MICROGram(s) IV Push every 4 hours PRN  folic acid 1 milliGRAM(s) Oral daily  glucagon  Injectable 1 milliGRAM(s) IntraMuscular once  heparin   Injectable 5000 Unit(s) SubCutaneous every 12 hours  insulin glargine Injectable (LANTUS) 8 Unit(s) SubCutaneous every morning  insulin lispro (ADMELOG) corrective regimen sliding scale   SubCutaneous every 6 hours  lactobacillus acidophilus 1 Tablet(s) Oral daily  LORazepam     Tablet 2 milliGRAM(s) Oral every 4 hours  melatonin 3 milliGRAM(s) Oral at bedtime PRN  methocarbamol 500 milliGRAM(s) Oral two times a day  multivitamin 1 Tablet(s) Oral daily  nystatin Powder 1 Application(s) Topical three times a day  ondansetron Injectable 4 milliGRAM(s) IV Push every 8 hours PRN  pantoprazole  Injectable 40 milliGRAM(s) IV Push daily  piperacillin/tazobactam IVPB.. 3.375 Gram(s) IV Intermittent every 8 hours  polyethylene glycol 3350 17 Gram(s) Oral every 12 hours  povidone iodine 10% Solution 1 Application(s) Topical daily  senna 3 Tablet(s) Oral at bedtime  simethicone 80 milliGRAM(s) Chew every 6 hours  sodium chloride 0.9% lock flush 10 milliLiter(s) IV Push every 1 hour PRN  sodium chloride 7% Inhalation 4 milliLiter(s) Inhalation every 12 hours  trimethoprim  160 mG/sulfamethoxazole 800 mG 2 Tablet(s) Oral two times a day      Review of Systems:  unable to obtain    Allergies: codeine (Hives)    For details regarding the patient's past medical history, social history, family history, and other miscellaneous elements, please refer the initial infectious diseases consultation and/or the admitting history and physical examination for this admission.    Objective:  Vital Signs Last 24 Hrs  T(C): 36.7 (23 Aug 2022 12:00), Max: 36.8 (22 Aug 2022 16:14)  T(F): 98 (23 Aug 2022 12:00), Max: 98.2 (22 Aug 2022 16:14)  HR: 72 (23 Aug 2022 11:00) (65 - 94)  BP: 96/58 (23 Aug 2022 11:00) (84/48 - 149/66)  BP(mean): 70 (23 Aug 2022 11:00) (61 - 89)  RR: 22 (23 Aug 2022 11:00) (16 - 33)  SpO2: 100% (23 Aug 2022 11:00) (97% - 100%)    Parameters below as of 23 Aug 2022 08:00  Patient On (Oxygen Delivery Method): ventilator    O2 Concentration (%): 60    Physical Examination:  General: no acute distress  HEENT: NC/AT  Neck: trach  Cardio: RRR  Resp: MV  breath sounds  Abd: soft, NT, ND  Neuro: no obvious focal deficits  Ext: no edema or cyanosis  Skin: warm, dry, no visible rash      Laboratory Studies:                        8.9    10.48 )-----------( 391      ( 23 Aug 2022 06:00 )             29.9   08-23    135  |  101  |  30<H>  ----------------------------<  158<H>  5.4<H>   |  26  |  1.32<H>    Ca    8.8      23 Aug 2022 06:00    TPro  6.4  /  Alb  1.7<L>  /  TBili  0.2  /  DBili  x   /  AST  13  /  ALT  12  /  AlkPhos  105  08-22        Microbiology: reviewed    Culture - Sputum (collected 08-19-22 @ 20:45)  Source: ET Tube ET Tube  Gram Stain (08-20-22 @ 03:35):    Few polymorphonuclear leukocytes per low power field    Rare Squamous epithelial cells per low power field    Few Gram Negative Rods per oil power field  Preliminary Report (08-20-22 @ 19:12):    Normal Respiratory Elvira present    Culture - Urine (collected 08-18-22 @ 20:00)  Source: Clean Catch Clean Catch (Midstream)  Final Report (08-21-22 @ 21:14):    10,000 - 49,000 CFU/mL Candida albicans "Susceptibilities not performed"    Culture - Blood (collected 08-18-22 @ 19:14)  Source: .Blood Blood-Peripheral  Preliminary Report (08-20-22 @ 01:02):    No growth to date.    Culture - Blood (collected 08-18-22 @ 18:45)  Source: .Blood Blood-Peripheral  Preliminary Report (08-20-22 @ 01:02):    No growth to date.        Radiology: reviewed  < from: CT Abdomen and Pelvis No Cont (08.20.22 @ 12:17) >    ACC: [id number] EXAM:  CT ABDOMEN AND PELVIS                          PROCEDURE DATE:  08/20/2022          INTERPRETATION:  CLINICAL INFORMATION: Sepsis, possible pneumonia,   evaluate intra-abdominal pathology.    COMPARISON: CT chest 8/18/2022. CT abdomen pelvis 4/21/2022.    CONTRAST/COMPLICATIONS:  IV Contrast: None  Oral Contrast: None  Complications: None reported    PROCEDURE:  CT of the Abdomen and Pelvis was performed.  Sagittal and coronal reformats were performed.    FINDINGS:  Evaluation of the solid organs and vasculature is limited without   intravenous contrast. Motion degraded exam.    LOWER CHEST: Redemonstration moderate bilateral pleural effusions, left   greater than right.    LIVER: Within normal limits.  BILE DUCTS: Normal caliber.  GALLBLADDER: Within normal limits.  SPLEEN: Within normal limits.  PANCREAS: Within normal limits.  ADRENALS: Within normal limits.  KIDNEYS/URETERS: Within normal limits.    BLADDER: Woody catheter.  REPRODUCTIVE ORGANS: Uterus and adnexa within normal limits.    BOWEL: No bowel obstruction. Appendix is not visualized. Scattered   colonic diverticula without evidence of acute diverticulitis. Gastrostomy   tube in the stomach.  PERITONEUM: No ascites.  VESSELS: Atherosclerotic changes.  RETROPERITONEUM/LYMPH NODES: No lymphadenopathy.  ABDOMINAL WALL: Within normal limits.  BONES: Degenerative changes. Chronic fracture deformity of the partially   imaged left femur, unchanged.    IMPRESSION:  No acute intra-abdominal pathology within limitations of noncontrast exam.    Redemonstrated moderate bilateral pleural effusions.        --- End of Report ---            ROBERT BLOUNT MD; Attending Radiologist  This document has been electronically signed. Aug 20 2022 12:59PM    < end of copied text >

## 2022-08-23 NOTE — PROGRESS NOTE ADULT - SUBJECTIVE AND OBJECTIVE BOX
INTERVAL HPI/OVERNIGHT EVENTS:  No new overnight event.  No N/V/D.  Tolerating diet.  less leakage around the peg  less fdistended    Allergies    codeine (Hives)    Intolerances    General:  No wt loss, fevers, chills, night sweats, fatigue,   Eyes:  Good vision, no reported pain  ENT:  No sore throat, pain, runny nose, dysphagia  CV:  No pain, palpitations, hypo/hypertension  Resp:  No dyspnea, cough, tachypnea, wheezing  GI:  No pain, No nausea, No vomiting, No diarrhea, No constipation, No weight loss, No fever, No pruritis, No rectal bleeding, No tarry stools, No dysphagia,  :  No pain, bleeding, incontinence, nocturia  Muscle:  No pain, weakness  Neuro:  No weakness, tingling, memory problems  Psych:  No fatigue, insomnia, mood problems, depression  Endocrine:  No polyuria, polydipsia, cold/heat intolerance  Heme:  No petechiae, ecchymosis, easy bruisability  Skin:  No rash, tattoos, scars, edema      PHYSICAL EXAM:   Vital Signs:  Vital Signs Last 24 Hrs  T(C): 36.6 (23 Aug 2022 06:14), Max: 36.8 (22 Aug 2022 16:14)  T(F): 97.8 (23 Aug 2022 06:14), Max: 98.2 (22 Aug 2022 16:14)  HR: 73 (23 Aug 2022 08:19) (65 - 93)  BP: 114/60 (23 Aug 2022 08:00) (84/48 - 133/64)  BP(mean): 77 (23 Aug 2022 08:00) (61 - 85)  RR: 19 (23 Aug 2022 08:00) (16 - 33)  SpO2: 100% (23 Aug 2022 08:19) (78% - 100%)    Parameters below as of 23 Aug 2022 08:00  Patient On (Oxygen Delivery Method): ventilator    O2 Concentration (%): 60  Daily     Daily I&O's Summary    22 Aug 2022 07:01  -  23 Aug 2022 07:00  --------------------------------------------------------  IN: 685 mL / OUT: 1620 mL / NET: -935 mL        GENERAL:  Appears stated age, well-groomed, well-nourished, no distress  HEENT:  NC/AT,  conjunctivae clear and pink, no thyromegaly, nodules, adenopathy, no JVD, sclera -anicteric  CHEST:  Full & symmetric excursion, no increased effort, breath sounds clear  HEART:  Regular rhythm, S1, S2, no murmur/rub/S3/S4, no abdominal bruit, no edema  ABDOMEN:  Soft, non-tender, non-distended, normoactive bowel sounds,  no masses ,no hepato-splenomegaly, no signs of chronic liver disease  EXTEREMITIES:  no cyanosis,clubbing or edema  SKIN:  No rash/erythema/ecchymoses/petechiae/wounds/abscess/warm/dry  NEURO:  Alert, oriented, no asterixis, no tremor, no encephalopathy      LABS:                        8.9    10.48 )-----------( 391      ( 23 Aug 2022 06:00 )             29.9     08-23    135  |  101  |  30<H>  ----------------------------<  158<H>  5.4<H>   |  26  |  1.32<H>    Ca    8.8      23 Aug 2022 06:00    TPro  6.4  /  Alb  1.7<L>  /  TBili  0.2  /  DBili  x   /  AST  13  /  ALT  12  /  AlkPhos  105  08-22    PT/INR - ( 22 Aug 2022 06:35 )   PT: 14.0 sec;   INR: 1.21 ratio             amylase   lipase  RADIOLOGY & ADDITIONAL TESTS:

## 2022-08-23 NOTE — PROGRESS NOTE ADULT - SUBJECTIVE AND OBJECTIVE BOX
Patient is a 76y old  Female who presents with a chief complaint of respiratory distress (23 Aug 2022 13:35)      BRIEF HOSPITAL COURSE:   75 yo f pmhx cardiac arrest, vdrf, dementia, cva, covid 19, ckd, anemia recurrent admissions for resp fx/pna/pleural effusion admitted with pna, acute on chronic hypoxic resp failure, pulm edema, chf exacerbation    Events last 24 hours:   no events today    PAST MEDICAL & SURGICAL HISTORY:  Dementia of frontal lobe type      Aphasic stroke      Diabetes mellitus      Respiratory failure      Hypertension      GERD (gastroesophageal reflux disease)      Constipation      Respiratory failure      CVA (cerebral vascular accident)      HTN (hypertension)      DM (diabetes mellitus)      Advanced dementia      COVID-19 virus detected      Quadriplegia      Pneumonia      Type II diabetes mellitus      Hx of appendectomy      Gastrostomy in place      Tracheostomy in place      Tracheostomy tube present      Feeding by G-tube        Allergies    codeine (Hives)    Intolerances      FAMILY HISTORY:  No pertinent family history in first degree relatives        Social History:   from facility       Review of Systems:  unable to obtain 2/2 baseline mental status       Physical Examination:    General: elderly female lying in bed, nad    HEENT: trach to vent    PULM: coarse b/l    CVS: Regular rate and rhythm, no murmurs, rubs, or gallops    ABD: Soft, nondistended, nontender, normoactive bowel sound, peg in place    EXT: + edema, contracted    SKIN: Warm     NEURO: unresponsive      Medications:  piperacillin/tazobactam IVPB.. 3.375 Gram(s) IV Intermittent every 8 hours  trimethoprim  160 mG/sulfamethoxazole 800 mG 2 Tablet(s) Oral two times a day      albuterol/ipratropium for Nebulization 3 milliLiter(s) Nebulizer every 6 hours  sodium chloride 7% Inhalation 4 milliLiter(s) Inhalation every 12 hours    acetaminophen     Tablet .. 650 milliGRAM(s) Oral every 6 hours PRN  fentaNYL    Injectable 25 MICROGram(s) IV Push every 4 hours PRN  LORazepam     Tablet 2 milliGRAM(s) Oral every 4 hours  melatonin 3 milliGRAM(s) Oral at bedtime PRN  methocarbamol 500 milliGRAM(s) Oral two times a day  ondansetron Injectable 4 milliGRAM(s) IV Push every 8 hours PRN      heparin   Injectable 5000 Unit(s) SubCutaneous every 12 hours    aluminum hydroxide/magnesium hydroxide/simethicone Suspension 30 milliLiter(s) Oral every 4 hours PRN  pantoprazole  Injectable 40 milliGRAM(s) IV Push daily  polyethylene glycol 3350 17 Gram(s) Oral every 12 hours  senna 3 Tablet(s) Oral at bedtime  simethicone 80 milliGRAM(s) Chew every 6 hours      dextrose 50% Injectable 25 Gram(s) IV Push once  dextrose 50% Injectable 12.5 Gram(s) IV Push once  dextrose 50% Injectable 25 Gram(s) IV Push once  dextrose Oral Gel 15 Gram(s) Oral once PRN  glucagon  Injectable 1 milliGRAM(s) IntraMuscular once  insulin glargine Injectable (LANTUS) 8 Unit(s) SubCutaneous every morning  insulin lispro (ADMELOG) corrective regimen sliding scale   SubCutaneous every 6 hours    dextrose 5%. 1000 milliLiter(s) IV Continuous <Continuous>  dextrose 5%. 1000 milliLiter(s) IV Continuous <Continuous>  folic acid 1 milliGRAM(s) Oral daily  multivitamin 1 Tablet(s) Oral daily  sodium chloride 0.9% lock flush 10 milliLiter(s) IV Push every 1 hour PRN      chlorhexidine 0.12% Liquid 15 milliLiter(s) Oral Mucosa every 12 hours  chlorhexidine 2% Cloths 1 Application(s) Topical daily  collagenase Ointment 1 Application(s) Topical daily  nystatin Powder 1 Application(s) Topical three times a day  povidone iodine 10% Solution 1 Application(s) Topical daily    lactobacillus acidophilus 1 Tablet(s) Oral daily      Mode: AC/ CMV (Assist Control/ Continuous Mandatory Ventilation)  RR (machine): 16  TV (machine): 400  FiO2: 40  PEEP: 5  ITime: 1  MAP: 14  PIP: 27      ICU Vital Signs Last 24 Hrs  T(C): 36.9 (23 Aug 2022 19:14), Max: 36.9 (23 Aug 2022 15:55)  T(F): 98.4 (23 Aug 2022 19:14), Max: 98.4 (23 Aug 2022 15:55)  HR: 69 (23 Aug 2022 20:56) (66 - 116)  BP: 149/66 (23 Aug 2022 18:00) (84/48 - 195/61)  BP(mean): 89 (23 Aug 2022 18:00) (61 - 128)  ABP: --  ABP(mean): --  RR: 36 (23 Aug 2022 18:00) (16 - 37)  SpO2: 99% (23 Aug 2022 20:56) (91% - 100%)    O2 Parameters below as of 23 Aug 2022 08:00  Patient On (Oxygen Delivery Method): ventilator    O2 Concentration (%): 60      Vital Signs Last 24 Hrs  T(C): 36.9 (23 Aug 2022 19:14), Max: 36.9 (23 Aug 2022 15:55)  T(F): 98.4 (23 Aug 2022 19:14), Max: 98.4 (23 Aug 2022 15:55)  HR: 69 (23 Aug 2022 20:56) (66 - 116)  BP: 149/66 (23 Aug 2022 18:00) (84/48 - 195/61)  BP(mean): 89 (23 Aug 2022 18:00) (61 - 128)  RR: 36 (23 Aug 2022 18:00) (16 - 37)  SpO2: 99% (23 Aug 2022 20:56) (91% - 100%)    Parameters below as of 23 Aug 2022 08:00  Patient On (Oxygen Delivery Method): ventilator    O2 Concentration (%): 60        I&O's Detail    22 Aug 2022 07:01  -  23 Aug 2022 07:00  --------------------------------------------------------  IN:    IV PiggyBack: 100 mL    Jevity 1.5: 585 mL  Total IN: 685 mL    OUT:    Indwelling Catheter - Urethral (mL): 1620 mL  Total OUT: 1620 mL    Total NET: -935 mL      23 Aug 2022 07:01  -  23 Aug 2022 22:46  --------------------------------------------------------  IN:  Total IN: 0 mL    OUT:    Indwelling Catheter - Urethral (mL): 600 mL  Total OUT: 600 mL    Total NET: -600 mL            LABS:                        8.9    10.48 )-----------( 391      ( 23 Aug 2022 06:00 )             29.9     08-23    135  |  101  |  30<H>  ----------------------------<  158<H>  5.4<H>   |  26  |  1.32<H>    Ca    8.8      23 Aug 2022 06:00    TPro  6.4  /  Alb  1.7<L>  /  TBili  0.2  /  DBili  x   /  AST  13  /  ALT  12  /  AlkPhos  105  08-22          CAPILLARY BLOOD GLUCOSE      POCT Blood Glucose.: 202 mg/dL (23 Aug 2022 17:58)    PT/INR - ( 22 Aug 2022 06:35 )   PT: 14.0 sec;   INR: 1.21 ratio             CULTURES:  Culture Results:   Few Pseudomonas aeruginosa (Carbapenem Resistant)  Moderate Stenotrophomonas maltophilia  Normal Respiratory Elvira present (08-19 @ 20:45)  Culture Results:   10,000 - 49,000 CFU/mL Candida albicans "Susceptibilities not performed" (08-18 @ 20:00)  Culture Results:   No growth to date. (08-18 @ 19:14)  Culture Results:   No growth to date. (08-18 @ 18:45)        RADIOLOGY: ***      SUPPLEMENTAL O2:   LINES:  MEENAKSHI:  FRANKLIN:   PPx:   CONTACT:

## 2022-08-23 NOTE — PROGRESS NOTE ADULT - ASSESSMENT
76F with dementia, COPD with chronic resp failure and is vent dependent, s/p PEG, hx of cardiac arrest, CHF, cor pulmonale, CVA, COVID-19 infxn, CKD, anemia, multiple admissions for respiratory distress (last admission from 6/1-6/9 diagnosed with PNA with parapneumonic pleural effusion s/p thoracentesis and Avycaz) who presents from [facility] for respiratory distress    GI cx noted  labs reviewed    ID eval noted - emp ABX in progress  cardio eval noted  vent support  GOC documented   is the HCP  pt is full code  old records reviewed  [facility] supportive care in progress  Fentanyl PRN on order for pain - sedation management

## 2022-08-23 NOTE — PROGRESS NOTE ADULT - ASSESSMENT
The patient is a 76 year old female with a history of HTN, DM, CVA, dementia, chronic respiratory failure s/p trach, PEG, cardiac arrest, anemia, pleural effusions who presents with respiratory distress.    Plan:  - Echo 5/30/22 with normal LV systolic function, mod pulm HTN  - Repeat echo similar. IVC small/collapsible, especially in a patient receiving positive pressure ventilation suggestive of hypovolemia.  - CT chest with moderate bilateral pleural effusions and upper lobe PNA  - Pleural effusions previously were exudative, likely parapneumonic  - BNP mildly elevated at 3505  - Resume furosemide 20 mg PO daily  - IV antibiotics

## 2022-08-23 NOTE — PROGRESS NOTE ADULT - SUBJECTIVE AND OBJECTIVE BOX
BREA BECKHAM     [facility]    Allergies    codeine (Hives)    Intolerances        PAST MEDICAL & SURGICAL HISTORY:  Dementia of frontal lobe type      Aphasic stroke      Diabetes mellitus      Respiratory failure      Hypertension      GERD (gastroesophageal reflux disease)      Constipation      Respiratory failure      CVA (cerebral vascular accident)      HTN (hypertension)      DM (diabetes mellitus)      Advanced dementia      COVID-19 virus detected      Quadriplegia      Pneumonia      Type II diabetes mellitus      Hx of appendectomy      Gastrostomy in place      Tracheostomy in place      Tracheostomy tube present      Feeding by G-tube          FAMILY HISTORY:  No pertinent family history in first degree relatives        Home Medications:  albuterol 90 mcg/inh inhalation aerosol with adapter: 2  inhaled every 6 hours (21 May 2022 21:16)  Bacid (LAC) oral tablet: 2 tab(s) by gastrostomy tube once a day (21 May 2022 21:16)  Betadine 10% topical swab: cleanse right and left great toes (21 May 2022 21:16)  Carafate 1 g/10 mL oral suspension: 10 milliliter(s) by gastrostomy tube 4 times a day (before meals and at bedtime) for 14 days (Started 6/4/21) (21 May 2022 21:16)  chlorhexidine 0.12% mucous membrane liquid: 15 milliliter(s) mucous membrane 2 times a day (21 May 2022 21:16)  ertapenem 1 g injection: 1 gram(s) injectable once a day until 6/11 (10 Zay 2022 12:12)  Eucerin topical cream: Apply topically to affected area once a day bilateral feet (21 May 2022 21:16)  folic acid 1 mg oral tablet: 1 tab(s) orally once a day (21 May 2022 21:16)  furosemide 20 mg oral tablet: 1 tab(s) orally once a day (10 Zay 2022 12:12)  insulin glargine 100 units/mL subcutaneous solution: 8 unit(s) subcutaneous once a day (in the morning) (01 Jun 2022 08:24)  ipratropium-albuterol 0.5 mg-2.5 mg/3 mL inhalation solution: 3 milliliter(s) inhaled 4 times a day (21 May 2022 21:16)  LORazepam 1 mg oral tablet: 1 tab(s) by gastrostomy tube every 4 hours (21 May 2022 21:16)  methocarbamol 500 mg oral tablet: 1 tab(s) by gastrostomy tube 2 times a day (21 May 2022 21:16)  MiraLax oral powder for reconstitution:  (21 May 2022 23:14)  Multiple Vitamins oral tablet: 1 tab(s) orally once a day (21 May 2022 21:16)  nystatin 100,000 units/g topical powder: 1 application topically 3 times a day (29 May 2022 16:35)  omeprazole 20 mg oral delayed release capsule: orally 2 times a day (21 May 2022 23:14)  polyethylene glycol 3350 oral powder for reconstitution: 17 gram(s) by gastrostomy tube every 12 hours (21 May 2022 21:16)  senna 8.6 mg oral tablet: 3 tab(s) by gastrostomy tube once a day (at bedtime) (21 May 2022 21:16)  simethicone 80 mg oral tablet, chewable: 1 tab(s) by gastrostomy tube every 6 hours (21 May 2022 21:16)  Tylenol 325 mg oral tablet: 2 tab(s) by gastrostomy tube once a day; 60 minutes prior to dressing change  (21 May 2022 21:16)  Tylenol 325 mg oral tablet: 2 tab(s) by gastrostomy tube every 6 hours, As Needed (21 May 2022 21:16)      MEDICATIONS  (STANDING):  albuterol/ipratropium for Nebulization 3 milliLiter(s) Nebulizer every 6 hours  chlorhexidine 0.12% Liquid 15 milliLiter(s) Oral Mucosa every 12 hours  chlorhexidine 2% Cloths 1 Application(s) Topical daily  collagenase Ointment 1 Application(s) Topical daily  dextrose 5%. 1000 milliLiter(s) (50 mL/Hr) IV Continuous <Continuous>  dextrose 5%. 1000 milliLiter(s) (100 mL/Hr) IV Continuous <Continuous>  dextrose 50% Injectable 25 Gram(s) IV Push once  dextrose 50% Injectable 12.5 Gram(s) IV Push once  dextrose 50% Injectable 25 Gram(s) IV Push once  folic acid 1 milliGRAM(s) Oral daily  glucagon  Injectable 1 milliGRAM(s) IntraMuscular once  heparin   Injectable 5000 Unit(s) SubCutaneous every 12 hours  insulin glargine Injectable (LANTUS) 8 Unit(s) SubCutaneous every morning  insulin lispro (ADMELOG) corrective regimen sliding scale   SubCutaneous every 6 hours  lactobacillus acidophilus 1 Tablet(s) Oral daily  LORazepam     Tablet 2 milliGRAM(s) Oral every 4 hours  methocarbamol 500 milliGRAM(s) Oral two times a day  multivitamin 1 Tablet(s) Oral daily  nystatin Powder 1 Application(s) Topical three times a day  pantoprazole  Injectable 40 milliGRAM(s) IV Push daily  piperacillin/tazobactam IVPB.. 3.375 Gram(s) IV Intermittent every 8 hours  polyethylene glycol 3350 17 Gram(s) Oral every 12 hours  povidone iodine 10% Solution 1 Application(s) Topical daily  senna 3 Tablet(s) Oral at bedtime  simethicone 80 milliGRAM(s) Chew every 6 hours  sodium chloride 7% Inhalation 4 milliLiter(s) Inhalation every 12 hours  trimethoprim  160 mG/sulfamethoxazole 800 mG 2 Tablet(s) Oral two times a day    MEDICATIONS  (PRN):  acetaminophen     Tablet .. 650 milliGRAM(s) Oral every 6 hours PRN Temp greater or equal to 38C (100.4F), Mild Pain (1 - 3)  aluminum hydroxide/magnesium hydroxide/simethicone Suspension 30 milliLiter(s) Oral every 4 hours PRN Dyspepsia  dextrose Oral Gel 15 Gram(s) Oral once PRN Blood Glucose LESS THAN 70 milliGRAM(s)/deciliter  fentaNYL    Injectable 25 MICROGram(s) IV Push every 4 hours PRN Moderate Pain (4 - 6), agitation  melatonin 3 milliGRAM(s) Oral at bedtime PRN Insomnia  ondansetron Injectable 4 milliGRAM(s) IV Push every 8 hours PRN Nausea and/or Vomiting  sodium chloride 0.9% lock flush 10 milliLiter(s) IV Push every 1 hour PRN Pre/post blood products, medications, blood draw, and to maintain line patency      Diet, NPO with Tube Feed:   Tube Feeding Modality: Gastrostomy  Glucerna 1.5 Horacio  Total Volume for 24 Hours (mL): 900  Continuous  Starting Tube Feed Rate mL per Hour: 45  Until Goal Tube Feed Rate (mL per Hour): 45  Tube Feed Duration (in Hours): 20  Tube Feed Start Time: 00:00  Free Water Flush  Bolus   Total Volume per Flush (mL): 200   Frequency: Every 6 Hours   Total Daily Volume of Flush (mL): 800    Start Time: 00:00 (08-19-22 @ 13:25) [Active]          Vital Signs Last 24 Hrs  T(C): 36.6 (23 Aug 2022 06:14), Max: 36.8 (22 Aug 2022 16:14)  T(F): 97.8 (23 Aug 2022 06:14), Max: 98.2 (22 Aug 2022 16:14)  HR: 73 (23 Aug 2022 08:19) (65 - 89)  BP: 114/60 (23 Aug 2022 08:00) (84/48 - 133/64)  BP(mean): 77 (23 Aug 2022 08:00) (61 - 85)  RR: 19 (23 Aug 2022 08:00) (16 - 33)  SpO2: 100% (23 Aug 2022 08:19) (98% - 100%)    Parameters below as of 23 Aug 2022 08:00  Patient On (Oxygen Delivery Method): ventilator    O2 Concentration (%): 60      08-22-22 @ 07:01  -  08-23-22 @ 07:00  --------------------------------------------------------  IN: 685 mL / OUT: 1620 mL / NET: -935 mL        Mode: PRVC, RR (machine): 16, TV (machine): 400, FiO2: 40, PEEP: 5, ITime: 1, MAP: 12, PIP: 28      LABS:                        8.9    10.48 )-----------( 391      ( 23 Aug 2022 06:00 )             29.9     08-23    135  |  101  |  30<H>  ----------------------------<  158<H>  5.4<H>   |  26  |  1.32<H>    Ca    8.8      23 Aug 2022 06:00    TPro  6.4  /  Alb  1.7<L>  /  TBili  0.2  /  DBili  x   /  AST  13  /  ALT  12  /  AlkPhos  105  08-22    PT/INR - ( 22 Aug 2022 06:35 )   PT: 14.0 sec;   INR: 1.21 ratio                   WBC:  WBC Count: 10.48 K/uL (08-23 @ 06:00)  WBC Count: 7.53 K/uL (08-22 @ 06:35)  WBC Count: 9.46 K/uL (08-21 @ 06:32)  WBC Count: 14.50 K/uL (08-20 @ 06:19)  WBC Count: 9.88 K/uL (08-20 @ 01:08)  WBC Count: 11.21 K/uL (08-19 @ 15:21)      MICROBIOLOGY:  RECENT CULTURES:  08-19 ET Tube ET Tube Pseudomonas aeruginosa (Carbapenem Resistant)  Stenotrophomonas maltophilia   Few polymorphonuclear leukocytes per low power field  Rare Squamous epithelial cells per low power field  Few Gram Negative Rods per oil power field   Few Pseudomonas aeruginosa (Carbapenem Resistant)  Moderate Stenotrophomonas maltophilia  Normal Respiratory Elvira present    08-18 Clean Catch Clean Catch (Midstream) XXXX XXXX   10,000 - 49,000 CFU/mL Candida albicans "Susceptibilities not performed"    08-18 .Blood Blood-Peripheral XXXX XXXX   No growth to date.    08-18 .Blood Blood-Peripheral XXXX XXXX   No growth to date.                PT/INR - ( 22 Aug 2022 06:35 )   PT: 14.0 sec;   INR: 1.21 ratio             Sodium:  Sodium, Serum: 135 mmol/L (08-23 @ 06:00)  Sodium, Serum: 135 mmol/L (08-22 @ 06:35)  Sodium, Serum: 137 mmol/L (08-21 @ 06:32)  Sodium, Serum: 137 mmol/L (08-20 @ 06:19)      1.32 mg/dL 08-23 @ 06:00  1.33 mg/dL 08-22 @ 06:35  1.39 mg/dL 08-21 @ 06:32  1.31 mg/dL 08-20 @ 06:19      Hemoglobin:  Hemoglobin: 8.9 g/dL (08-23 @ 06:00)  Hemoglobin: 7.5 g/dL (08-22 @ 06:35)  Hemoglobin: 7.9 g/dL (08-21 @ 06:32)  Hemoglobin: 8.9 g/dL (08-20 @ 06:19)  Hemoglobin: 8.3 g/dL (08-20 @ 01:08)  Hemoglobin: 7.0 g/dL (08-19 @ 15:21)      Platelets: Platelet Count - Automated: 391 K/uL (08-23 @ 06:00)  Platelet Count - Automated: 285 K/uL (08-22 @ 06:35)  Platelet Count - Automated: 293 K/uL (08-21 @ 06:32)  Platelet Count - Automated: 304 K/uL (08-20 @ 06:19)  Platelet Count - Automated: 292 K/uL (08-20 @ 01:08)  Platelet Count - Automated: 297 K/uL (08-19 @ 15:21)      LIVER FUNCTIONS - ( 22 Aug 2022 06:35 )  Alb: 1.7 g/dL / Pro: 6.4 g/dL / ALK PHOS: 105 U/L / ALT: 12 U/L DA / AST: 13 U/L / GGT: x                 RADIOLOGY & ADDITIONAL STUDIES:      MICROBIOLOGY:  RECENT CULTURES:  08-19 ET Tube ET Tube Pseudomonas aeruginosa (Carbapenem Resistant)  Stenotrophomonas maltophilia   Few polymorphonuclear leukocytes per low power field  Rare Squamous epithelial cells per low power field  Few Gram Negative Rods per oil power field   Few Pseudomonas aeruginosa (Carbapenem Resistant)  Moderate Stenotrophomonas maltophilia  Normal Respiratory Elvira present    08-18 Clean Catch Clean Catch (Midstream) XXXX XXXX   10,000 - 49,000 CFU/mL Candida albicans "Susceptibilities not performed"    08-18 .Blood Blood-Peripheral XXXX XXXX   No growth to date.    08-18 .Blood Blood-Peripheral XXXX XXXX   No growth to date.

## 2022-08-23 NOTE — PROGRESS NOTE ADULT - SUBJECTIVE AND OBJECTIVE BOX
Patient seen and examined  ICU events noted overnight  Trach/vent nonverbal unable to obtain meaningful ROS    Review of Systems:  unable to obtain due to mental status  Objective:  Vitals  T(C): 36.6 (08-23-22 @ 06:14), Max: 36.8 (08-22-22 @ 16:14)  HR: 73 (08-23-22 @ 08:19) (65 - 93)  BP: 114/60 (08-23-22 @ 08:00) (84/48 - 133/64)  RR: 19 (08-23-22 @ 08:00) (16 - 33)  SpO2: 100% (08-23-22 @ 08:19) (78% - 100%)    Physical Exam:  General: chronically ill appearing  HEENT: Atraumatic, no LAD, trachea midline, PERRLA  Cardiovascular: normal s1s2, no murmurs, gallops or fricition rubs  Pulmonary: clear to ausculation Bilaterally, no wheezing , rhonchi  Gastrointestinal: soft non tender non distended, no masses felt, no organomegally  Muscloskeletal: no lower extremity edema, intact bilateral lower extremity pulses  Neurological: CN II-12 intact. No focal weakness  Psychiatrical: normal mood, cooperative  SKIN: no rash, lesions or ulcers    Labs:                          8.9    10.48 )-----------( 391      ( 23 Aug 2022 06:00 )             29.9     08-23    135  |  101  |  30<H>  ----------------------------<  158<H>  5.4<H>   |  26  |  1.32<H>    Ca    8.8      23 Aug 2022 06:00    TPro  6.4  /  Alb  1.7<L>  /  TBili  0.2  /  DBili  x   /  AST  13  /  ALT  12  /  AlkPhos  105  08-22    LIVER FUNCTIONS - ( 22 Aug 2022 06:35 )  Alb: 1.7 g/dL / Pro: 6.4 g/dL / ALK PHOS: 105 U/L / ALT: 12 U/L DA / AST: 13 U/L / GGT: x           PT/INR - ( 22 Aug 2022 06:35 )   PT: 14.0 sec;   INR: 1.21 ratio               Active Medications  MEDICATIONS  (STANDING):  albuterol/ipratropium for Nebulization 3 milliLiter(s) Nebulizer every 6 hours  chlorhexidine 0.12% Liquid 15 milliLiter(s) Oral Mucosa every 12 hours  chlorhexidine 2% Cloths 1 Application(s) Topical daily  collagenase Ointment 1 Application(s) Topical daily  dextrose 5%. 1000 milliLiter(s) (50 mL/Hr) IV Continuous <Continuous>  dextrose 5%. 1000 milliLiter(s) (100 mL/Hr) IV Continuous <Continuous>  dextrose 50% Injectable 25 Gram(s) IV Push once  dextrose 50% Injectable 12.5 Gram(s) IV Push once  dextrose 50% Injectable 25 Gram(s) IV Push once  folic acid 1 milliGRAM(s) Oral daily  glucagon  Injectable 1 milliGRAM(s) IntraMuscular once  heparin   Injectable 5000 Unit(s) SubCutaneous every 12 hours  insulin glargine Injectable (LANTUS) 8 Unit(s) SubCutaneous every morning  insulin lispro (ADMELOG) corrective regimen sliding scale   SubCutaneous every 6 hours  lactobacillus acidophilus 1 Tablet(s) Oral daily  LORazepam     Tablet 2 milliGRAM(s) Oral every 4 hours  methocarbamol 500 milliGRAM(s) Oral two times a day  multivitamin 1 Tablet(s) Oral daily  nystatin Powder 1 Application(s) Topical three times a day  pantoprazole  Injectable 40 milliGRAM(s) IV Push daily  piperacillin/tazobactam IVPB.. 3.375 Gram(s) IV Intermittent every 8 hours  polyethylene glycol 3350 17 Gram(s) Oral every 12 hours  povidone iodine 10% Solution 1 Application(s) Topical daily  senna 3 Tablet(s) Oral at bedtime  simethicone 80 milliGRAM(s) Chew every 6 hours  sodium chloride 7% Inhalation 4 milliLiter(s) Inhalation every 12 hours  trimethoprim  160 mG/sulfamethoxazole 800 mG 2 Tablet(s) Oral two times a day    MEDICATIONS  (PRN):  acetaminophen     Tablet .. 650 milliGRAM(s) Oral every 6 hours PRN Temp greater or equal to 38C (100.4F), Mild Pain (1 - 3)  aluminum hydroxide/magnesium hydroxide/simethicone Suspension 30 milliLiter(s) Oral every 4 hours PRN Dyspepsia  dextrose Oral Gel 15 Gram(s) Oral once PRN Blood Glucose LESS THAN 70 milliGRAM(s)/deciliter  fentaNYL    Injectable 25 MICROGram(s) IV Push every 4 hours PRN Moderate Pain (4 - 6), agitation  melatonin 3 milliGRAM(s) Oral at bedtime PRN Insomnia  ondansetron Injectable 4 milliGRAM(s) IV Push every 8 hours PRN Nausea and/or Vomiting  sodium chloride 0.9% lock flush 10 milliLiter(s) IV Push every 1 hour PRN Pre/post blood products, medications, blood draw, and to maintain line patency

## 2022-08-23 NOTE — PROGRESS NOTE ADULT - ASSESSMENT
REVIEW OF SYMPTOMS      Able to give (reliable) ROS  NO     PHYSICAL EXAM    HEENT Unremarkable  atraumatic   RESP Fair air entry EXP prolonged    Harsh breath sound Resp distres mild   CARDIAC S1 S2 No S3     NO JVD    ABDOMEN SOFT BS PRESENT NOT DISTENDED No hepatosplenomegaly   PEDAL EDEMA present No calf tenderness  NO rash       AGE/SEX.   76 f  DOA.  8/18/2022  CC .  8/18/2022 respiratory failure, chronic trach vent, full code, recent Pneumonia/pleural effusion  shortness of breath    PROCEDURE  8/19/2022 [facility]     HOSPITAL COURSE.   WOUND CARE 8/18/2022   PNEUMONIA 8/18/2022 8/19 Tmax 101  LACTICEMIA 8/18/2022  VENT DEPENDENT  RESP FAILURE 8/18/2022    ANEMIA 8/18-8/19/2022 Hb 9.8 - 7    HYPERKALEMIA 8/18/2022   RYAN 8/18/2022   DM     PMH .  pmh Pneumonia    pmh 5/2/2022 SERRATIA CARBAPENEM RESISTANT PSEUDOMONAS   pmh HTN,   pmh DM,   pmh CVA,   pmh  chronic respiratory failure   pmh s/p trach, PEG,   pmh cardiac arrest  pmh Pl effsn  r PL EFFSN   6/8/2022 r thorac g 161 l 222 p 4.9 p 10 l 16 .38    l pl effsn  5/25/2022 g 183 l 144/381 .37 p 3.4/5.3 .64 p 23 l 36   5/25/2022l pl fluid  lymph pred exudate   cyto (-)             GENERAL DATA .   GOC.  8/18/2022 full code      ALLGY.   codeine                          WT.     8/18/2022 63                               BMI.     8/18/2022 23                         ICU STAY. 8/18/2022  COVID. 8/18/2022 scv2 (-)       BEST PRACTICE ISSUES.    HOB ELEVATN. Yes  DVT PPLX.  8/18/2022 hpsc     SQUIRES PPLX.  8/18/2022 protonix 40     INFN PPLX. 8/18/2022 chlorhexidine .12%     8/19/2022 chlorhexidine 4%   SP SW EM.        DIET. 8/18/2022 gflucerna 1200 gt                      VS/ PO/IO/ VENT/ DRIPS.   8/23/2022 afeb 110 140/60   8/23/2022 ac 16/400/5/.4     ASSESSMENT/RECOMMENDATIONS .   RESP.  VENT MANAGEMENT.  HOB elevation  Target Pplat 30 (-)  Target PO 90-95%  Target pH 730 (+)  Daily spontaneous breathing trials   Daily sedation vacation     GAS EXCHANGE.  vbg 8/18/2022 vbg pH 737   8/18/2022 8p   vent 100% 16/400 748/34/168     SEDATION.  8/20/2022 fentanyl 25.6 p    COPD.   8/18/2022 duoneb   8/19 nacl 7% bid     INFECTION.  WOUND CARE  8/18/2022 povidone l and r great toe   8/19/2022 collagenase buttocks     PNEUMONIA .  w 8/18-8/19-8/20-8/22/2022 w 25 - 11 - 14 - 7.5  ua 8/18/2022 w 3-5   pr 8/20-8/21-8/22/2022 pr 3.3 - 1.7 - 1.2   ct  8/18   persistent mod bl effs   underlying dependent airspace consolidation   septal thickening and patchy ground glass opacities maddie r lung   ctap nc 8/20/2022 (-)   rvp 8/18/2022 (-)  bc 8/18/2022 (-)   mrsa 8/19/2022 (-)   8/19/2022  zosyn Se Vignesh  8/19/2022 bactrim ds 2 bid   a/r.    PAST MICROBIO.   5/2/2022 SERRATIA CARBAPENEM RESISTANT PSEUDOMONAS     PLEURAL EFFSN.  ct  8/18   persistent mod bl effs     CARDIAC.  LACTICEMIA.  la 8/18-8/19/2022 la 3.7- 1.2     CHF.  bnp 8/19-8/22/2022 bnp 00935  -  3505   echo 8/19/2022 n lvsf dd1 pasp 58   8/19/2022 lasix 40 one dose     GI.  HEMAT.  ANEMIA.   Hb 8/18-8/19-8/20-8/21-7/22-8/23/2022   Hb 9.8 - 7-8.9   - 7.9 - 7.5 - 8.9   8/19/2022 7:21 PM recheck Hb   target Hb 7 (+)       TRANSFUSION   8/19 1 u prbc    RENAL.  RYAN.  Cr 8/18-8/19- 8/20-8/21-8/22/2022   Cr 1.2- 1.1 - 1.3 - 1.3 - 1.3   monitor    IV fl.  none     ENDOCRINE.   DM.  8/18/2022 Insulin     NEURO.  8/19/2022 lorazepam 2.6   8/19/2022 methocarbamol 500.2     TIME SPENT   Over 39 minutes aggregate critical  care time spent on encounter; activities included   direct patient care, counseling and/or coordinating care reviewing notes, lab data/ imaging , discussion with multidisciplinary team/ patient  /family and explaining in detail risks, benefits, alternatives  of the recommendations     CHAPINCITO GUIDRY 76 f [facility] S 8/18/2022   DR JOSEPH DU

## 2022-08-24 LAB
ANION GAP SERPL CALC-SCNC: 4 MMOL/L — LOW (ref 5–17)
BUN SERPL-MCNC: 31 MG/DL — HIGH (ref 7–23)
CALCIUM SERPL-MCNC: 8.6 MG/DL — SIGNIFICANT CHANGE UP (ref 8.4–10.5)
CHLORIDE SERPL-SCNC: 101 MMOL/L — SIGNIFICANT CHANGE UP (ref 96–108)
CO2 SERPL-SCNC: 27 MMOL/L — SIGNIFICANT CHANGE UP (ref 22–31)
CREAT SERPL-MCNC: 1.41 MG/DL — HIGH (ref 0.5–1.3)
CULTURE RESULTS: SIGNIFICANT CHANGE UP
CULTURE RESULTS: SIGNIFICANT CHANGE UP
EGFR: 38 ML/MIN/1.73M2 — LOW
GLUCOSE SERPL-MCNC: 144 MG/DL — HIGH (ref 70–99)
HCT VFR BLD CALC: 26 % — LOW (ref 34.5–45)
HGB BLD-MCNC: 7.9 G/DL — LOW (ref 11.5–15.5)
MCHC RBC-ENTMCNC: 27.1 PG — SIGNIFICANT CHANGE UP (ref 27–34)
MCHC RBC-ENTMCNC: 30.4 GM/DL — LOW (ref 32–36)
MCV RBC AUTO: 89.3 FL — SIGNIFICANT CHANGE UP (ref 80–100)
NRBC # BLD: 0 /100 WBCS — SIGNIFICANT CHANGE UP (ref 0–0)
PLATELET # BLD AUTO: 321 K/UL — SIGNIFICANT CHANGE UP (ref 150–400)
POTASSIUM SERPL-MCNC: 5.9 MMOL/L — HIGH (ref 3.5–5.3)
POTASSIUM SERPL-SCNC: 5.9 MMOL/L — HIGH (ref 3.5–5.3)
RBC # BLD: 2.91 M/UL — LOW (ref 3.8–5.2)
RBC # FLD: 16.6 % — HIGH (ref 10.3–14.5)
SODIUM SERPL-SCNC: 132 MMOL/L — LOW (ref 135–145)
SPECIMEN SOURCE: SIGNIFICANT CHANGE UP
SPECIMEN SOURCE: SIGNIFICANT CHANGE UP
WBC # BLD: 7.31 K/UL — SIGNIFICANT CHANGE UP (ref 3.8–10.5)
WBC # FLD AUTO: 7.31 K/UL — SIGNIFICANT CHANGE UP (ref 3.8–10.5)

## 2022-08-24 PROCEDURE — 99233 SBSQ HOSP IP/OBS HIGH 50: CPT

## 2022-08-24 RX ORDER — DEXTROSE 50 % IN WATER 50 %
50 SYRINGE (ML) INTRAVENOUS ONCE
Refills: 0 | Status: COMPLETED | OUTPATIENT
Start: 2022-08-24 | End: 2022-08-24

## 2022-08-24 RX ORDER — FENTANYL CITRATE 50 UG/ML
50 INJECTION INTRAVENOUS ONCE
Refills: 0 | Status: DISCONTINUED | OUTPATIENT
Start: 2022-08-24 | End: 2022-08-24

## 2022-08-24 RX ORDER — INSULIN HUMAN 100 [IU]/ML
10 INJECTION, SOLUTION SUBCUTANEOUS ONCE
Refills: 0 | Status: COMPLETED | OUTPATIENT
Start: 2022-08-24 | End: 2022-08-24

## 2022-08-24 RX ADMIN — METHOCARBAMOL 500 MILLIGRAM(S): 500 TABLET, FILM COATED ORAL at 17:34

## 2022-08-24 RX ADMIN — CHLORHEXIDINE GLUCONATE 1 APPLICATION(S): 213 SOLUTION TOPICAL at 12:31

## 2022-08-24 RX ADMIN — Medication 3 MILLILITER(S): at 01:26

## 2022-08-24 RX ADMIN — NYSTATIN CREAM 1 APPLICATION(S): 100000 CREAM TOPICAL at 06:02

## 2022-08-24 RX ADMIN — Medication 1 APPLICATION(S): at 12:32

## 2022-08-24 RX ADMIN — Medication 50 MILLILITER(S): at 21:02

## 2022-08-24 RX ADMIN — PANTOPRAZOLE SODIUM 40 MILLIGRAM(S): 20 TABLET, DELAYED RELEASE ORAL at 12:29

## 2022-08-24 RX ADMIN — Medication 2: at 12:30

## 2022-08-24 RX ADMIN — SIMETHICONE 80 MILLIGRAM(S): 80 TABLET, CHEWABLE ORAL at 17:35

## 2022-08-24 RX ADMIN — Medication 2 MILLIGRAM(S): at 01:45

## 2022-08-24 RX ADMIN — INSULIN GLARGINE 8 UNIT(S): 100 INJECTION, SOLUTION SUBCUTANEOUS at 07:54

## 2022-08-24 RX ADMIN — Medication 2 MILLIGRAM(S): at 14:47

## 2022-08-24 RX ADMIN — PIPERACILLIN AND TAZOBACTAM 25 GRAM(S): 4; .5 INJECTION, POWDER, LYOPHILIZED, FOR SOLUTION INTRAVENOUS at 23:09

## 2022-08-24 RX ADMIN — HEPARIN SODIUM 5000 UNIT(S): 5000 INJECTION INTRAVENOUS; SUBCUTANEOUS at 17:35

## 2022-08-24 RX ADMIN — SODIUM CHLORIDE 4 MILLILITER(S): 9 INJECTION INTRAMUSCULAR; INTRAVENOUS; SUBCUTANEOUS at 07:50

## 2022-08-24 RX ADMIN — SIMETHICONE 80 MILLIGRAM(S): 80 TABLET, CHEWABLE ORAL at 12:30

## 2022-08-24 RX ADMIN — FENTANYL CITRATE 25 MICROGRAM(S): 50 INJECTION INTRAVENOUS at 01:49

## 2022-08-24 RX ADMIN — FENTANYL CITRATE 25 MICROGRAM(S): 50 INJECTION INTRAVENOUS at 12:53

## 2022-08-24 RX ADMIN — Medication 2 MILLIGRAM(S): at 17:35

## 2022-08-24 RX ADMIN — HEPARIN SODIUM 5000 UNIT(S): 5000 INJECTION INTRAVENOUS; SUBCUTANEOUS at 06:02

## 2022-08-24 RX ADMIN — SIMETHICONE 80 MILLIGRAM(S): 80 TABLET, CHEWABLE ORAL at 00:17

## 2022-08-24 RX ADMIN — NYSTATIN CREAM 1 APPLICATION(S): 100000 CREAM TOPICAL at 15:07

## 2022-08-24 RX ADMIN — FENTANYL CITRATE 25 MICROGRAM(S): 50 INJECTION INTRAVENOUS at 02:19

## 2022-08-24 RX ADMIN — PIPERACILLIN AND TAZOBACTAM 25 GRAM(S): 4; .5 INJECTION, POWDER, LYOPHILIZED, FOR SOLUTION INTRAVENOUS at 07:54

## 2022-08-24 RX ADMIN — CHLORHEXIDINE GLUCONATE 15 MILLILITER(S): 213 SOLUTION TOPICAL at 06:00

## 2022-08-24 RX ADMIN — FENTANYL CITRATE 25 MICROGRAM(S): 50 INJECTION INTRAVENOUS at 18:02

## 2022-08-24 RX ADMIN — Medication 3 MILLILITER(S): at 07:50

## 2022-08-24 RX ADMIN — Medication 2 MILLIGRAM(S): at 06:01

## 2022-08-24 RX ADMIN — Medication 3 MILLIGRAM(S): at 21:07

## 2022-08-24 RX ADMIN — PIPERACILLIN AND TAZOBACTAM 25 GRAM(S): 4; .5 INJECTION, POWDER, LYOPHILIZED, FOR SOLUTION INTRAVENOUS at 00:17

## 2022-08-24 RX ADMIN — Medication 1 MILLIGRAM(S): at 12:30

## 2022-08-24 RX ADMIN — INSULIN HUMAN 10 UNIT(S): 100 INJECTION, SOLUTION SUBCUTANEOUS at 21:04

## 2022-08-24 RX ADMIN — METHOCARBAMOL 500 MILLIGRAM(S): 500 TABLET, FILM COATED ORAL at 06:01

## 2022-08-24 RX ADMIN — Medication 1 TABLET(S): at 12:30

## 2022-08-24 RX ADMIN — SIMETHICONE 80 MILLIGRAM(S): 80 TABLET, CHEWABLE ORAL at 23:09

## 2022-08-24 RX ADMIN — Medication 20 MILLIGRAM(S): at 06:01

## 2022-08-24 RX ADMIN — Medication 2 MILLIGRAM(S): at 09:58

## 2022-08-24 RX ADMIN — SODIUM CHLORIDE 4 MILLILITER(S): 9 INJECTION INTRAMUSCULAR; INTRAVENOUS; SUBCUTANEOUS at 20:20

## 2022-08-24 RX ADMIN — FENTANYL CITRATE 50 MICROGRAM(S): 50 INJECTION INTRAVENOUS at 06:01

## 2022-08-24 RX ADMIN — CHLORHEXIDINE GLUCONATE 15 MILLILITER(S): 213 SOLUTION TOPICAL at 17:35

## 2022-08-24 RX ADMIN — Medication 2 TABLET(S): at 17:35

## 2022-08-24 RX ADMIN — Medication 2 TABLET(S): at 06:01

## 2022-08-24 RX ADMIN — NYSTATIN CREAM 1 APPLICATION(S): 100000 CREAM TOPICAL at 21:13

## 2022-08-24 RX ADMIN — Medication 3 MILLILITER(S): at 13:44

## 2022-08-24 RX ADMIN — POLYETHYLENE GLYCOL 3350 17 GRAM(S): 17 POWDER, FOR SOLUTION ORAL at 06:01

## 2022-08-24 RX ADMIN — SIMETHICONE 80 MILLIGRAM(S): 80 TABLET, CHEWABLE ORAL at 06:01

## 2022-08-24 RX ADMIN — Medication 2 MILLIGRAM(S): at 21:07

## 2022-08-24 RX ADMIN — Medication 3 MILLILITER(S): at 20:19

## 2022-08-24 RX ADMIN — FENTANYL CITRATE 50 MICROGRAM(S): 50 INJECTION INTRAVENOUS at 06:10

## 2022-08-24 RX ADMIN — PIPERACILLIN AND TAZOBACTAM 25 GRAM(S): 4; .5 INJECTION, POWDER, LYOPHILIZED, FOR SOLUTION INTRAVENOUS at 15:06

## 2022-08-24 NOTE — PROGRESS NOTE ADULT - SUBJECTIVE AND OBJECTIVE BOX
BREA BECKHAM     [facility] 04    Allergies    codeine (Hives)    Intolerances        PAST MEDICAL & SURGICAL HISTORY:  Dementia of frontal lobe type      Aphasic stroke      Diabetes mellitus      Respiratory failure      Hypertension      GERD (gastroesophageal reflux disease)      Constipation      Respiratory failure      CVA (cerebral vascular accident)      HTN (hypertension)      DM (diabetes mellitus)      Advanced dementia      COVID-19 virus detected      Quadriplegia      Pneumonia      Type II diabetes mellitus      Hx of appendectomy      Gastrostomy in place      Tracheostomy in place      Tracheostomy tube present      Feeding by G-tube          FAMILY HISTORY:  No pertinent family history in first degree relatives        Home Medications:  albuterol 90 mcg/inh inhalation aerosol with adapter: 2  inhaled every 6 hours (21 May 2022 21:16)  Bacid (LAC) oral tablet: 2 tab(s) by gastrostomy tube once a day (21 May 2022 21:16)  Betadine 10% topical swab: cleanse right and left great toes (21 May 2022 21:16)  Carafate 1 g/10 mL oral suspension: 10 milliliter(s) by gastrostomy tube 4 times a day (before meals and at bedtime) for 14 days (Started 6/4/21) (21 May 2022 21:16)  chlorhexidine 0.12% mucous membrane liquid: 15 milliliter(s) mucous membrane 2 times a day (21 May 2022 21:16)  ertapenem 1 g injection: 1 gram(s) injectable once a day until 6/11 (10 Zay 2022 12:12)  Eucerin topical cream: Apply topically to affected area once a day bilateral feet (21 May 2022 21:16)  folic acid 1 mg oral tablet: 1 tab(s) orally once a day (21 May 2022 21:16)  furosemide 20 mg oral tablet: 1 tab(s) orally once a day (10 Zay 2022 12:12)  insulin glargine 100 units/mL subcutaneous solution: 8 unit(s) subcutaneous once a day (in the morning) (01 Jun 2022 08:24)  ipratropium-albuterol 0.5 mg-2.5 mg/3 mL inhalation solution: 3 milliliter(s) inhaled 4 times a day (21 May 2022 21:16)  LORazepam 1 mg oral tablet: 1 tab(s) by gastrostomy tube every 4 hours (21 May 2022 21:16)  methocarbamol 500 mg oral tablet: 1 tab(s) by gastrostomy tube 2 times a day (21 May 2022 21:16)  MiraLax oral powder for reconstitution:  (21 May 2022 23:14)  Multiple Vitamins oral tablet: 1 tab(s) orally once a day (21 May 2022 21:16)  nystatin 100,000 units/g topical powder: 1 application topically 3 times a day (29 May 2022 16:35)  omeprazole 20 mg oral delayed release capsule: orally 2 times a day (21 May 2022 23:14)  polyethylene glycol 3350 oral powder for reconstitution: 17 gram(s) by gastrostomy tube every 12 hours (21 May 2022 21:16)  senna 8.6 mg oral tablet: 3 tab(s) by gastrostomy tube once a day (at bedtime) (21 May 2022 21:16)  simethicone 80 mg oral tablet, chewable: 1 tab(s) by gastrostomy tube every 6 hours (21 May 2022 21:16)  Tylenol 325 mg oral tablet: 2 tab(s) by gastrostomy tube once a day; 60 minutes prior to dressing change  (21 May 2022 21:16)  Tylenol 325 mg oral tablet: 2 tab(s) by gastrostomy tube every 6 hours, As Needed (21 May 2022 21:16)      MEDICATIONS  (STANDING):  albuterol/ipratropium for Nebulization 3 milliLiter(s) Nebulizer every 6 hours  chlorhexidine 0.12% Liquid 15 milliLiter(s) Oral Mucosa every 12 hours  chlorhexidine 2% Cloths 1 Application(s) Topical daily  collagenase Ointment 1 Application(s) Topical daily  dextrose 5%. 1000 milliLiter(s) (50 mL/Hr) IV Continuous <Continuous>  dextrose 5%. 1000 milliLiter(s) (100 mL/Hr) IV Continuous <Continuous>  dextrose 50% Injectable 25 Gram(s) IV Push once  dextrose 50% Injectable 12.5 Gram(s) IV Push once  dextrose 50% Injectable 25 Gram(s) IV Push once  folic acid 1 milliGRAM(s) Oral daily  furosemide    Tablet 20 milliGRAM(s) Oral daily  glucagon  Injectable 1 milliGRAM(s) IntraMuscular once  heparin   Injectable 5000 Unit(s) SubCutaneous every 12 hours  insulin glargine Injectable (LANTUS) 8 Unit(s) SubCutaneous every morning  insulin lispro (ADMELOG) corrective regimen sliding scale   SubCutaneous every 6 hours  lactobacillus acidophilus 1 Tablet(s) Oral daily  LORazepam     Tablet 2 milliGRAM(s) Oral every 4 hours  methocarbamol 500 milliGRAM(s) Oral two times a day  multivitamin 1 Tablet(s) Oral daily  nystatin Powder 1 Application(s) Topical three times a day  pantoprazole  Injectable 40 milliGRAM(s) IV Push daily  piperacillin/tazobactam IVPB.. 3.375 Gram(s) IV Intermittent every 8 hours  polyethylene glycol 3350 17 Gram(s) Oral every 12 hours  povidone iodine 10% Solution 1 Application(s) Topical daily  senna 3 Tablet(s) Oral at bedtime  simethicone 80 milliGRAM(s) Chew every 6 hours  sodium chloride 7% Inhalation 4 milliLiter(s) Inhalation every 12 hours  trimethoprim  160 mG/sulfamethoxazole 800 mG 2 Tablet(s) Oral two times a day    MEDICATIONS  (PRN):  acetaminophen     Tablet .. 650 milliGRAM(s) Oral every 6 hours PRN Temp greater or equal to 38C (100.4F), Mild Pain (1 - 3)  aluminum hydroxide/magnesium hydroxide/simethicone Suspension 30 milliLiter(s) Oral every 4 hours PRN Dyspepsia  dextrose Oral Gel 15 Gram(s) Oral once PRN Blood Glucose LESS THAN 70 milliGRAM(s)/deciliter  fentaNYL    Injectable 25 MICROGram(s) IV Push every 4 hours PRN Moderate Pain (4 - 6), agitation  melatonin 3 milliGRAM(s) Oral at bedtime PRN Insomnia  ondansetron Injectable 4 milliGRAM(s) IV Push every 8 hours PRN Nausea and/or Vomiting  sodium chloride 0.9% lock flush 10 milliLiter(s) IV Push every 1 hour PRN Pre/post blood products, medications, blood draw, and to maintain line patency      Diet, NPO with Tube Feed:   Tube Feeding Modality: Gastrostomy  Glucerna 1.5 Horacio  Total Volume for 24 Hours (mL): 900  Continuous  Starting Tube Feed Rate mL per Hour: 45  Until Goal Tube Feed Rate (mL per Hour): 45  Tube Feed Duration (in Hours): 20  Tube Feed Start Time: 00:00  Free Water Flush  Bolus   Total Volume per Flush (mL): 200   Frequency: Every 6 Hours   Total Daily Volume of Flush (mL): 800    Start Time: 00:00 (08-19-22 @ 13:25) [Active]          Vital Signs Last 24 Hrs  T(C): 36.9 (24 Aug 2022 04:02), Max: 36.9 (23 Aug 2022 15:55)  T(F): 98.5 (24 Aug 2022 04:02), Max: 98.5 (24 Aug 2022 04:02)  HR: 63 (24 Aug 2022 07:00) (58 - 116)  BP: 98/59 (24 Aug 2022 07:00) (85/53 - 195/61)  BP(mean): 71 (24 Aug 2022 07:00) (61 - 128)  RR: 16 (24 Aug 2022 07:00) (14 - 37)  SpO2: 99% (24 Aug 2022 07:00) (91% - 100%)    Parameters below as of 24 Aug 2022 06:00  Patient On (Oxygen Delivery Method): ventilator          08-23-22 @ 07:01  -  08-24-22 @ 07:00  --------------------------------------------------------  IN: 1085 mL / OUT: 1350 mL / NET: -265 mL        Mode: AC/ CMV (Assist Control/ Continuous Mandatory Ventilation), RR (machine): 16, TV (machine): 400, FiO2: 40, PEEP: 5, ITime: 1, MAP: 17, PIP: 38      LABS:                        8.9    10.48 )-----------( 391      ( 23 Aug 2022 06:00 )             29.9     08-23    135  |  101  |  30<H>  ----------------------------<  158<H>  5.4<H>   |  26  |  1.32<H>    Ca    8.8      23 Aug 2022 06:00                WBC:  WBC Count: 10.48 K/uL (08-23 @ 06:00)  WBC Count: 7.53 K/uL (08-22 @ 06:35)  WBC Count: 9.46 K/uL (08-21 @ 06:32)      MICROBIOLOGY:  RECENT CULTURES:  08-19 ET Tube ET Tube Pseudomonas aeruginosa (Carbapenem Resistant)  Stenotrophomonas maltophilia   Few polymorphonuclear leukocytes per low power field  Rare Squamous epithelial cells per low power field  Few Gram Negative Rods per oil power field   Few Pseudomonas aeruginosa (Carbapenem Resistant)  Moderate Stenotrophomonas maltophilia  Normal Respiratory Elvira present    08-18 Clean Catch Clean Catch (Midstream) XXXX XXXX   10,000 - 49,000 CFU/mL Candida albicans "Susceptibilities not performed"    08-18 .Blood Blood-Peripheral XXXX XXXX   No Growth Final    08-18 .Blood Blood-Peripheral XXXX XXXX   No Growth Final                    Sodium:  Sodium, Serum: 135 mmol/L (08-23 @ 06:00)  Sodium, Serum: 135 mmol/L (08-22 @ 06:35)  Sodium, Serum: 137 mmol/L (08-21 @ 06:32)      1.32 mg/dL 08-23 @ 06:00  1.33 mg/dL 08-22 @ 06:35  1.39 mg/dL 08-21 @ 06:32      Hemoglobin:  Hemoglobin: 8.9 g/dL (08-23 @ 06:00)  Hemoglobin: 7.5 g/dL (08-22 @ 06:35)  Hemoglobin: 7.9 g/dL (08-21 @ 06:32)      Platelets: Platelet Count - Automated: 391 K/uL (08-23 @ 06:00)  Platelet Count - Automated: 285 K/uL (08-22 @ 06:35)  Platelet Count - Automated: 293 K/uL (08-21 @ 06:32)              RADIOLOGY & ADDITIONAL STUDIES:      MICROBIOLOGY:  RECENT CULTURES:  08-19 ET Tube ET Tube Pseudomonas aeruginosa (Carbapenem Resistant)  Stenotrophomonas maltophilia   Few polymorphonuclear leukocytes per low power field  Rare Squamous epithelial cells per low power field  Few Gram Negative Rods per oil power field   Few Pseudomonas aeruginosa (Carbapenem Resistant)  Moderate Stenotrophomonas maltophilia  Normal Respiratory Elvira present    08-18 Clean Catch Clean Catch (Midstream) XXXX XXXX   10,000 - 49,000 CFU/mL Candida albicans "Susceptibilities not performed"    08-18 .Blood Blood-Peripheral XXXX XXXX   No Growth Final    08-18 .Blood Blood-Peripheral XXXX XXXX   No Growth Final

## 2022-08-24 NOTE — PROGRESS NOTE ADULT - ASSESSMENT
76F with dementia, COPD with chronic resp failure and is vent dependent, s/p PEG, hx of cardiac arrest, CHF, cor pulmonale, CVA, anemia, multiple admissions for respiratory distress who presents from [facility] for respiratory distress again.     Sepsis 2/2 PNA  Acute on chronic RF  B/l pleural effusions  - most recent cultures w/ serratia and CRE pseudomonas  - imaging reviewed  - cx reviewed. BCx NGTD. UCx Candida. RCx NOF  Plan  C/w Zosyn  C/w bactrim  Plan for x7 day course through 8/25, last day tomorrow  Supportive care, O2 and pulm toilet  trend temps/ wbc    Infectious Diseases will continue to follow. Please call with any questions.   Andressa Brantley M.D.  [facility], Division of Infectious Diseases [phone]

## 2022-08-24 NOTE — PROGRESS NOTE ADULT - ASSESSMENT
76F with dementia, COPD with chronic resp failure and is vent dependent, s/p PEG, hx of cardiac arrest, CHF, cor pulmonale, CVA, COVID-19 infxn, CKD, anemia, multiple admissions for respiratory distress (last admission from 6/1-6/9 diagnosed with PNA with parapneumonic pleural effusion s/p thoracentesis and Avycaz) who presents from [facility] for respiratory distress    GI cx noted  labs reviewed  PO Lasix added  cm follow up noted    ID eval noted - emp ABX in progress  cardio eval noted  vent support  GOC documented   is the HCP  pt is full code  old records reviewed  [facility] supportive care in progress  Fentanyl PRN on order for pain - sedation management

## 2022-08-24 NOTE — CONSULT NOTE ADULT - SUBJECTIVE AND OBJECTIVE BOX
Chief Complaint:  Patient is a 76y old  Female who presents with a chief complaint of respiratory distress peg is leaking and the abdomen is distended  History of Present Illness:   ***Patient with dementia and is non-verbal and is chronically trach/vented.  Patient is from facility and therefore information is obtained from chart.***    76F with dementia, COPD with chronic resp failure and is vent dependent, s/p PEG, hx of cardiac arrest, CHF, cor pulmonale, CVA, COVID-19 infxn, CKD, anemia, multiple admissions for respiratory distress (last admission from - diagnosed with PNA with parapneumonic pleural effusion s/p thoracentesis and Avycaz) who presents from [facility] for respiratory distress again.  [facility] transfer note only mention respiratory distress.  As per ED note, patient was noted to have increased dyspnea and worsening hypoxia.  No noted pain or reported fevers/chills.  In the ED, patient's triage vitals were /72  , RR 24, 100% on vent, T 99.7F.  Labs showed leukocytosis 25.06 (up from 6.85 on 6/10), anemia 9.8 (from 11.3), thrombocytosis 466 (from 264), hyperkalemia 5.7, lactic acid 3.7.  ABG was 7.48/34/168.  CT chest pending.  CXR showed possible consolidation on right lobe.  Patient started empirically on vancomycin and zosyn by ED.  Patient is being admitted for respiratory distress 2/2 PNA.  Currently patient is non-verbal and does not seem to be in any distress.       Review of Systems:  Unable to obtain due to: Dementia    Allergies:  codeine (Hives)      Medications:  acetaminophen     Tablet .. 650 milliGRAM(s) Oral every 6 hours PRN  albuterol/ipratropium for Nebulization 3 milliLiter(s) Nebulizer every 6 hours  aluminum hydroxide/magnesium hydroxide/simethicone Suspension 30 milliLiter(s) Oral every 4 hours PRN  chlorhexidine 0.12% Liquid 15 milliLiter(s) Oral Mucosa every 12 hours  chlorhexidine 2% Cloths 1 Application(s) Topical daily  collagenase Ointment 1 Application(s) Topical daily  dextrose 5%. 1000 milliLiter(s) IV Continuous <Continuous>  dextrose 5%. 1000 milliLiter(s) IV Continuous <Continuous>  dextrose 50% Injectable 25 Gram(s) IV Push once  dextrose 50% Injectable 12.5 Gram(s) IV Push once  dextrose 50% Injectable 25 Gram(s) IV Push once  dextrose Oral Gel 15 Gram(s) Oral once PRN  fentaNYL    Injectable 25 MICROGram(s) IV Push every 4 hours PRN  folic acid 1 milliGRAM(s) Oral daily  glucagon  Injectable 1 milliGRAM(s) IntraMuscular once  heparin   Injectable 5000 Unit(s) SubCutaneous every 12 hours  insulin glargine Injectable (LANTUS) 8 Unit(s) SubCutaneous every morning  insulin lispro (ADMELOG) corrective regimen sliding scale   SubCutaneous every 6 hours  lactobacillus acidophilus 1 Tablet(s) Oral daily  LORazepam     Tablet 2 milliGRAM(s) Oral every 4 hours  melatonin 3 milliGRAM(s) Oral at bedtime PRN  methocarbamol 500 milliGRAM(s) Oral two times a day  multivitamin 1 Tablet(s) Oral daily  nystatin Powder 1 Application(s) Topical three times a day  ondansetron Injectable 4 milliGRAM(s) IV Push every 8 hours PRN  pantoprazole  Injectable 40 milliGRAM(s) IV Push daily  piperacillin/tazobactam IVPB.. 3.375 Gram(s) IV Intermittent every 8 hours  polyethylene glycol 3350 17 Gram(s) Oral every 12 hours  povidone iodine 10% Solution 1 Application(s) Topical daily  senna 3 Tablet(s) Oral at bedtime  simethicone 80 milliGRAM(s) Chew every 6 hours  sodium chloride 0.9% lock flush 10 milliLiter(s) IV Push every 1 hour PRN  sodium chloride 7% Inhalation 4 milliLiter(s) Inhalation every 12 hours  trimethoprim  160 mG/sulfamethoxazole 800 mG 2 Tablet(s) Oral two times a day      PMHX/PSHX:  Dementia of frontal lobe type    Aphasic stroke    Diabetes mellitus    Respiratory failure    Hypertension    GERD (gastroesophageal reflux disease)    Constipation    Respiratory failure    CVA (cerebral vascular accident)    HTN (hypertension)    DM (diabetes mellitus)    Advanced dementia    COVID-19 virus detected    Quadriplegia    Pneumonia    Type II diabetes mellitus    Hx of appendectomy    Gastrostomy in place    Tracheostomy in place    Tracheostomy tube present    Feeding by G-tube        Family history:  No pertinent family history in first degree relatives    No pertinent family history in first degree relatives    No pertinent family history in first degree relatives    No pertinent family history in first degree relatives        Social History:     ROS:     General:  No wt loss, fevers, chills, night sweats, fatigue,   Eyes:  Good vision, no reported pain  ENT:  No sore throat, pain, runny nose, dysphagia  CV:  No pain, palpitations, hypo/hypertension  Resp:  No dyspnea, cough, tachypnea, wheezing  GI:  No pain, No nausea, No vomiting, No diarrhea, No constipation, No weight loss, No fever, No pruritis, No rectal bleeding, No tarry stools, No dysphagia,  :  No pain, bleeding, incontinence, nocturia  Muscle:  No pain, weakness  Neuro:  No weakness, tingling, memory problems  Psych:  No fatigue, insomnia, mood problems, depression  Endocrine:  No polyuria, polydipsia, cold/heat intolerance  Heme:  No petechiae, ecchymosis, easy bruisability  Skin:  No rash, tattoos, scars, edema      PHYSICAL EXAM:   Vital Signs:  Vital Signs Last 24 Hrs  T(C): 36.8 (22 Aug 2022 16:14), Max: 36.8 (21 Aug 2022 19:38)  T(F): 98.2 (22 Aug 2022 16:14), Max: 98.3 (21 Aug 2022 19:38)  HR: 72 (22 Aug 2022 17:06) (64 - 93)  BP: 108/74 (22 Aug 2022 15:00) (87/49 - 121/55)  BP(mean): 85 (22 Aug 2022 15:00) (61 - 85)  RR: 16 (22 Aug 2022 15:00) (14 - 36)  SpO2: 100% (22 Aug 2022 17:06) (78% - 100%)    Parameters below as of 21 Aug 2022 21:29  Patient On (Oxygen Delivery Method): ventilator      Daily     Daily Weight in k.8 (22 Aug 2022 08:23)    GENERAL:  Appears stated age, well-groomed, well-nourished, no distress  HEENT:  NC/AT,  conjunctivae clear and pink, no thyromegaly, nodules, adenopathy, no JVD, sclera -anicteric  CHEST:  Full & symmetric excursion, no increased effort, breath sounds clear  HEART:  Regular rhythm, S1, S2, no murmur/rub/S3/S4, no abdominal bruit, no edema  ABDOMEN:  Soft, non-tender, non-distended, normoactive bowel sounds,  no masses ,no hepato-splenomegaly, no signs of chronic liver disease  EXTEREMITIES:  no cyanosis,clubbing or edema  SKIN:  No rash/erythema/ecchymoses/petechiae/wounds/abscess/warm/dry  NEURO:  Alert, oriented, no asterixis, no tremor, no encephalopathy    LABS:                        7.5    7.53  )-----------( 285      ( 22 Aug 2022 06:35 )             24.5     08-    135  |  102  |  37<H>  ----------------------------<  143<H>  5.2   |  30  |  1.33<H>    Ca    8.3<L>      22 Aug 2022 06:35    TPro  6.4  /  Alb  1.7<L>  /  TBili  0.2  /  DBili  x   /  AST  13  /  ALT  12  /  AlkPhos  105  08-22    LIVER FUNCTIONS - ( 22 Aug 2022 06:35 )  Alb: 1.7 g/dL / Pro: 6.4 g/dL / ALK PHOS: 105 U/L / ALT: 12 U/L DA / AST: 13 U/L / GGT: x           PT/INR - ( 22 Aug 2022 06:35 )   PT: 14.0 sec;   INR: 1.21 ratio                 Imaging:          
  HPI:  ***Patient with dementia and is non-verbal and is chronically trach/vented.  Patient is from facility and therefore information is obtained from chart.***    76F with dementia, COPD with chronic resp failure and is vent dependent, s/p PEG, hx of cardiac arrest, CHF, cor pulmonale, CVA, COVID-19 infxn, CKD, anemia, multiple admissions for respiratory distress (last admission from 6/1-6/9 diagnosed with PNA with parapneumonic pleural effusion s/p thoracentesis and Avycaz) who presents from [facility] for respiratory distress again.  H transfer note only mention respiratory distress.  As per ED note, patient was noted to have increased dyspnea and worsening hypoxia.  No noted pain or reported fevers/chills. On Admission presents with multiple chronic pressure injuries  at left buttock and bilateral hallux    PAST MEDICAL & SURGICAL HISTORY:  Dementia of frontal lobe type      Aphasic stroke      Diabetes mellitus      Respiratory failure      Hypertension      GERD (gastroesophageal reflux disease)      Constipation      Respiratory failure      CVA (cerebral vascular accident)      HTN (hypertension)      DM (diabetes mellitus)      Advanced dementia      COVID-19 virus detected      Quadriplegia      Pneumonia      Type II diabetes mellitus      Hx of appendectomy      Gastrostomy in place      Tracheostomy in place      Tracheostomy tube present      Feeding by G-tube      REVIEW OF SYSTEMS  Unable to obtain ROS  2/2 non-verbal status        MEDICATIONS  (STANDING):  albuterol/ipratropium for Nebulization 3 milliLiter(s) Nebulizer every 6 hours  chlorhexidine 0.12% Liquid 15 milliLiter(s) Oral Mucosa every 12 hours  chlorhexidine 2% Cloths 1 Application(s) Topical daily  collagenase Ointment 1 Application(s) Topical daily  dextrose 5%. 1000 milliLiter(s) (50 mL/Hr) IV Continuous <Continuous>  dextrose 5%. 1000 milliLiter(s) (100 mL/Hr) IV Continuous <Continuous>  dextrose 50% Injectable 25 Gram(s) IV Push once  dextrose 50% Injectable 12.5 Gram(s) IV Push once  dextrose 50% Injectable 25 Gram(s) IV Push once  folic acid 1 milliGRAM(s) Oral daily  furosemide    Tablet 20 milliGRAM(s) Oral daily  glucagon  Injectable 1 milliGRAM(s) IntraMuscular once  heparin   Injectable 5000 Unit(s) SubCutaneous every 12 hours  insulin glargine Injectable (LANTUS) 8 Unit(s) SubCutaneous every morning  insulin lispro (ADMELOG) corrective regimen sliding scale   SubCutaneous every 6 hours  lactobacillus acidophilus 1 Tablet(s) Oral daily  LORazepam     Tablet 2 milliGRAM(s) Oral every 4 hours  methocarbamol 500 milliGRAM(s) Oral two times a day  multivitamin 1 Tablet(s) Oral daily  nystatin Powder 1 Application(s) Topical three times a day  pantoprazole  Injectable 40 milliGRAM(s) IV Push daily  piperacillin/tazobactam IVPB.. 3.375 Gram(s) IV Intermittent every 8 hours  polyethylene glycol 3350 17 Gram(s) Oral every 12 hours  povidone iodine 10% Solution 1 Application(s) Topical daily  senna 3 Tablet(s) Oral at bedtime  simethicone 80 milliGRAM(s) Chew every 6 hours  sodium chloride 7% Inhalation 4 milliLiter(s) Inhalation every 12 hours  trimethoprim  160 mG/sulfamethoxazole 800 mG 2 Tablet(s) Oral two times a day    MEDICATIONS  (PRN):  acetaminophen     Tablet .. 650 milliGRAM(s) Oral every 6 hours PRN Temp greater or equal to 38C (100.4F), Mild Pain (1 - 3)  aluminum hydroxide/magnesium hydroxide/simethicone Suspension 30 milliLiter(s) Oral every 4 hours PRN Dyspepsia  dextrose Oral Gel 15 Gram(s) Oral once PRN Blood Glucose LESS THAN 70 milliGRAM(s)/deciliter  fentaNYL    Injectable 25 MICROGram(s) IV Push every 4 hours PRN Moderate Pain (4 - 6), agitation  melatonin 3 milliGRAM(s) Oral at bedtime PRN Insomnia  ondansetron Injectable 4 milliGRAM(s) IV Push every 8 hours PRN Nausea and/or Vomiting  sodium chloride 0.9% lock flush 10 milliLiter(s) IV Push every 1 hour PRN Pre/post blood products, medications, blood draw, and to maintain line patency      Allergies    codeine (Hives)    Intolerances    SOCIAL HISTORY:      FAMILY HISTORY:  No pertinent family history in first degree relatives      Vital Signs Last 24 Hrs  T(C): 36.6 (24 Aug 2022 08:30), Max: 36.9 (23 Aug 2022 15:55)  T(F): 97.8 (24 Aug 2022 08:30), Max: 98.5 (24 Aug 2022 04:02)  HR: 65 (24 Aug 2022 10:00) (58 - 116)  BP: 117/56 (24 Aug 2022 10:00) (85/53 - 195/61)  BP(mean): 75 (24 Aug 2022 10:00) (61 - 128)  RR: 22 (24 Aug 2022 10:00) (14 - 37)  SpO2: 100% (24 Aug 2022 10:00) (91% - 100%)    Parameters below as of 24 Aug 2022 08:00  Patient On (Oxygen Delivery Method): ventilator    O2 Concentration (%): 40                          8.9    10.48 )-----------( 391      ( 23 Aug 2022 06:00 )             29.9     08-23    135  |  101  |  30<H>  ----------------------------<  158<H>  5.4<H>   |  26  |  1.32<H>    Ca    8.8      23 Aug 2022 06:00        A1C with Estimated Average Glucose Result: 7.3 % (08-19-22 @ 06:36)      PHYSICAL EXAM:    General: resting comfortably in bed; NAD  ENT: no nasal discharge;   Neck: trach in place/vented  Extremities: no gross deformities  Dermatologic: left buttock unstageable pressure injury 100% tan slough mild serosanguinous drainage No odor,  warmth, tenderness, induration, fluctuance  , bilateral hallux unstageable pressure injuries stable necrotic eschar no odor no warmth, no drainage, + erythema  Musculoskeletal/Vascular: no deformities/ contractures                        
Date/Time Patient Seen:  		  Referring MD:   Data Reviewed	       Patient is a 76y old  Female who presents with a chief complaint of respiratory distress (18 Aug 2022 22:09)      Subjective/HPI   76F with dementia, COPD with chronic resp failure and is vent dependent, s/p PEG, hx of cardiac arrest, CHF, cor pulmonale, CVA, COVID-19 infxn, CKD, anemia, multiple admissions for respiratory distress (last admission from 6/1-6/9 diagnosed with PNA with parapneumonic pleural effusion s/p thoracentesis and Avycaz) who presents from [facility] for respiratory distress  PAST MEDICAL & SURGICAL HISTORY:  Dementia of frontal lobe type    Aphasic stroke    Diabetes mellitus    Respiratory failure    Hypertension    GERD (gastroesophageal reflux disease)    Constipation    Respiratory failure    CVA (cerebral vascular accident)    HTN (hypertension)    DM (diabetes mellitus)    Advanced dementia    COVID-19 virus detected    Quadriplegia    Pneumonia    Type II diabetes mellitus    Hx of appendectomy    Gastrostomy in place    Tracheostomy in place    Tracheostomy tube present    Feeding by G-tube          Medication list         MEDICATIONS  (STANDING):  albuterol/ipratropium for Nebulization 3 milliLiter(s) Nebulizer every 6 hours  chlorhexidine 0.12% Liquid 15 milliLiter(s) Oral Mucosa every 12 hours  chlorhexidine 0.12% Liquid 15 milliLiter(s) Oral Mucosa every 12 hours  chlorhexidine 4% Liquid 1 Application(s) Topical <User Schedule>  collagenase Ointment 1 Application(s) Topical daily  dextrose 5%. 1000 milliLiter(s) (100 mL/Hr) IV Continuous <Continuous>  dextrose 5%. 1000 milliLiter(s) (50 mL/Hr) IV Continuous <Continuous>  dextrose 50% Injectable 25 Gram(s) IV Push once  dextrose 50% Injectable 12.5 Gram(s) IV Push once  dextrose 50% Injectable 25 Gram(s) IV Push once  ertapenem  IVPB 1000 milliGRAM(s) IV Intermittent every 24 hours  folic acid 1 milliGRAM(s) Oral daily  glucagon  Injectable 1 milliGRAM(s) IntraMuscular once  heparin   Injectable 5000 Unit(s) SubCutaneous every 12 hours  insulin glargine Injectable (LANTUS) 8 Unit(s) SubCutaneous every morning  insulin lispro (ADMELOG) corrective regimen sliding scale   SubCutaneous every 6 hours  lactobacillus acidophilus 1 Tablet(s) Oral daily  LORazepam     Tablet 2 milliGRAM(s) Oral every 4 hours  methocarbamol 500 milliGRAM(s) Oral two times a day  multivitamin 1 Tablet(s) Oral daily  nystatin Powder 1 Application(s) Topical three times a day  pantoprazole  Injectable 40 milliGRAM(s) IV Push daily  polyethylene glycol 3350 17 Gram(s) Oral every 12 hours  povidone iodine 10% Solution 1 Application(s) Topical daily  senna 3 Tablet(s) Oral at bedtime  simethicone 80 milliGRAM(s) Chew every 6 hours    MEDICATIONS  (PRN):  acetaminophen     Tablet .. 650 milliGRAM(s) Oral every 6 hours PRN Temp greater or equal to 38C (100.4F), Mild Pain (1 - 3)  aluminum hydroxide/magnesium hydroxide/simethicone Suspension 30 milliLiter(s) Oral every 4 hours PRN Dyspepsia  dextrose Oral Gel 15 Gram(s) Oral once PRN Blood Glucose LESS THAN 70 milliGRAM(s)/deciliter  melatonin 3 milliGRAM(s) Oral at bedtime PRN Insomnia  ondansetron Injectable 4 milliGRAM(s) IV Push every 8 hours PRN Nausea and/or Vomiting  sodium chloride 0.9% lock flush 10 milliLiter(s) IV Push every 1 hour PRN Pre/post blood products, medications, blood draw, and to maintain line patency         Vitals log        ICU Vital Signs Last 24 Hrs  T(C): 36.6 (19 Aug 2022 04:29), Max: 37.6 (18 Aug 2022 20:18)  T(F): 97.9 (19 Aug 2022 04:29), Max: 99.7 (18 Aug 2022 20:18)  HR: 82 (19 Aug 2022 06:58) (79 - 138)  BP: 89/51 (19 Aug 2022 03:00) (89/51 - 181/88)  BP(mean): 63 (19 Aug 2022 03:00) (63 - 113)  ABP: --  ABP(mean): --  RR: 23 (19 Aug 2022 03:00) (22 - 40)  SpO2: 95% (19 Aug 2022 06:58) (91% - 100%)    O2 Parameters below as of 19 Aug 2022 01:46  Patient On (Oxygen Delivery Method): ventilator             Mode: AC/ CMV (Assist Control/ Continuous Mandatory Ventilation)  RR (machine): 16  TV (machine): 400  FiO2: 60  PEEP: 5  ITime: 1  MAP: 18  PIP: 38      Input and Output:  I&O's Detail    18 Aug 2022 07:01  -  19 Aug 2022 07:00  --------------------------------------------------------  IN:  Total IN: 0 mL    OUT:    Voided (mL): 900 mL  Total OUT: 900 mL    Total NET: -900 mL          Lab Data                        9.8    25.06 )-----------( 466      ( 18 Aug 2022 18:45 )             32.9     08-19    138  |  102  |  46<H>  ----------------------------<  146<H>  4.5   |  27  |  1.10    Ca    8.5      19 Aug 2022 06:36    TPro  7.0  /  Alb  2.0<L>  /  TBili  0.3  /  DBili  x   /  AST  11  /  ALT  11  /  AlkPhos  99  08-19    ABG - ( 18 Aug 2022 20:06 )  pH, Arterial: 7.48  pH, Blood: x     /  pCO2: 34    /  pO2: 168   / HCO3: 25    / Base Excess: 1.8   /  SaO2: 98.8                    Review of Systems	  ventilated    Objective     Physical Examination        Pertinent Lab findings & Imaging      Annalise:  NO   Adequate UO     I&O's Detail    18 Aug 2022 07:01  -  19 Aug 2022 07:00  --------------------------------------------------------  IN:  Total IN: 0 mL    OUT:    Voided (mL): 900 mL  Total OUT: 900 mL    Total NET: -900 mL               Discussed with:     Cultures:	        Radiology  INTERPRETATION:  AP chest on June 8, 2022 at 10:05 AM. Is taken after   right thoracentesis.    Heart magnified by technique.    Sizable right effusion on June 1 markedly diminished. No pneumothorax.   Tracheostomy remains.    There is also diminished left effusion with mild residual and some   adjacent left lower lobe infiltrate.    Irregular atelectatic area of right lower hilum is unchanged.    IMPRESSION: Diminished right effusion after thoracentesis.                          
    BREA CHAPINCITO     EDIP 01    Allergies    codeine (Hives)    Intolerances        PAST MEDICAL & SURGICAL HISTORY:  Dementia of frontal lobe type      Aphasic stroke      Diabetes mellitus      Respiratory failure      Hypertension      GERD (gastroesophageal reflux disease)      Constipation      Respiratory failure      CVA (cerebral vascular accident)      HTN (hypertension)      DM (diabetes mellitus)      Advanced dementia      COVID-19 virus detected      Quadriplegia      Pneumonia      Type II diabetes mellitus      Hx of appendectomy      Gastrostomy in place      Tracheostomy in place      Tracheostomy tube present      Feeding by G-tube          FAMILY HISTORY:  No pertinent family history in first degree relatives        Home Medications:  albuterol 90 mcg/inh inhalation aerosol with adapter: 2  inhaled every 6 hours (21 May 2022 21:16)  Bacid (LAC) oral tablet: 2 tab(s) by gastrostomy tube once a day (21 May 2022 21:16)  Betadine 10% topical swab: cleanse right and left great toes (21 May 2022 21:16)  Carafate 1 g/10 mL oral suspension: 10 milliliter(s) by gastrostomy tube 4 times a day (before meals and at bedtime) for 14 days (Started 6/4/21) (21 May 2022 21:16)  chlorhexidine 0.12% mucous membrane liquid: 15 milliliter(s) mucous membrane 2 times a day (21 May 2022 21:16)  ertapenem 1 g injection: 1 gram(s) injectable once a day until 6/11 (10 Zay 2022 12:12)  Eucerin topical cream: Apply topically to affected area once a day bilateral feet (21 May 2022 21:16)  folic acid 1 mg oral tablet: 1 tab(s) orally once a day (21 May 2022 21:16)  furosemide 20 mg oral tablet: 1 tab(s) orally once a day (10 Zay 2022 12:12)  insulin glargine 100 units/mL subcutaneous solution: 8 unit(s) subcutaneous once a day (in the morning) (01 Jun 2022 08:24)  ipratropium-albuterol 0.5 mg-2.5 mg/3 mL inhalation solution: 3 milliliter(s) inhaled 4 times a day (21 May 2022 21:16)  LORazepam 1 mg oral tablet: 1 tab(s) by gastrostomy tube every 4 hours (21 May 2022 21:16)  methocarbamol 500 mg oral tablet: 1 tab(s) by gastrostomy tube 2 times a day (21 May 2022 21:16)  MiraLax oral powder for reconstitution:  (21 May 2022 23:14)  Multiple Vitamins oral tablet: 1 tab(s) orally once a day (21 May 2022 21:16)  nystatin 100,000 units/g topical powder: 1 application topically 3 times a day (29 May 2022 16:35)  omeprazole 20 mg oral delayed release capsule: orally 2 times a day (21 May 2022 23:14)  polyethylene glycol 3350 oral powder for reconstitution: 17 gram(s) by gastrostomy tube every 12 hours (21 May 2022 21:16)  senna 8.6 mg oral tablet: 3 tab(s) by gastrostomy tube once a day (at bedtime) (21 May 2022 21:16)  simethicone 80 mg oral tablet, chewable: 1 tab(s) by gastrostomy tube every 6 hours (21 May 2022 21:16)  Tylenol 325 mg oral tablet: 2 tab(s) by gastrostomy tube once a day; 60 minutes prior to dressing change  (21 May 2022 21:16)  Tylenol 325 mg oral tablet: 2 tab(s) by gastrostomy tube every 6 hours, As Needed (21 May 2022 21:16)      MEDICATIONS  (STANDING):    MEDICATIONS  (PRN):              Vital Signs Last 24 Hrs  T(C): --  T(F): --  HR: 102 (18 Aug 2022 19:14) (102 - 138)  BP: 161/72 (18 Aug 2022 18:06) (161/72 - 161/72)  BP(mean): --  RR: 40 (18 Aug 2022 19:14) (24 - 40)  SpO2: 100% (18 Aug 2022 19:14) (91% - 100%)    Parameters below as of 18 Aug 2022 19:14  Patient On (Oxygen Delivery Method): tracheostomy collar  O2 Flow (L/min): 15          Mode: AC/ CMV (Assist Control/ Continuous Mandatory Ventilation), RR (machine): 16, TV (machine): 400, FiO2: 100, PEEP: 5, ITime: 0.8, MAP: 9, PIP: 25      LABS:                        9.8    25.06 )-----------( 466      ( 18 Aug 2022 18:45 )             32.9     08-18    135  |  101  |  50<H>  ----------------------------<  278<H>  5.7<H>   |  24  |  1.26    Ca    9.6      18 Aug 2022 18:45    TPro  8.9<H>  /  Alb  x   /  TBili  0.4  /  DBili  x   /  AST  21  /  ALT  16  /  AlkPhos  145<H>  08-18    PT/INR - ( 18 Aug 2022 18:45 )   PT: 13.2 sec;   INR: 1.12 ratio         PTT - ( 18 Aug 2022 18:45 )  PTT:45.9 sec          WBC:  WBC Count: 25.06 K/uL (08-18 @ 18:45)      MICROBIOLOGY:  RECENT CULTURES:              PT/INR - ( 18 Aug 2022 18:45 )   PT: 13.2 sec;   INR: 1.12 ratio         PTT - ( 18 Aug 2022 18:45 )  PTT:45.9 sec    Sodium:  Sodium, Serum: 135 mmol/L (08-18 @ 18:45)      1.26 mg/dL 08-18 @ 18:45      Hemoglobin:  Hemoglobin: 9.8 g/dL (08-18 @ 18:45)      Platelets: Platelet Count - Automated: 466 K/uL (08-18 @ 18:45)      LIVER FUNCTIONS - ( 18 Aug 2022 18:45 )  Alb: x     / Pro: 8.9 g/dL / ALK PHOS: 145 U/L / ALT: 16 U/L DA / AST: 21 U/L / GGT: x                 RADIOLOGY & ADDITIONAL STUDIES:      MICROBIOLOGY:  RECENT CULTURES:          
History of Present Illness: The patient is a 76 year old female with a history of HTN, DM, CVA, dementia, chronic respiratory failure s/p trach, PEG, cardiac arrest, anemia, pleural effusions who presents with respiratory distress. The patient is unable to provide additional history.    Past Medical/Surgical History:  HTN, DM, CVA, dementia, chronic respiratory failure s/p trach, PEG, cardiac arrest, anemia, pleural effusions    Medications:  Home Medications:  albuterol 90 mcg/inh inhalation aerosol with adapter: 2  inhaled every 6 hours (21 May 2022 21:16)  Bacid (LAC) oral tablet: 2 tab(s) by gastrostomy tube once a day (21 May 2022 21:16)  Betadine 10% topical swab: cleanse right and left great toes (21 May 2022 21:16)  Carafate 1 g/10 mL oral suspension: 10 milliliter(s) by gastrostomy tube 4 times a day (before meals and at bedtime) for 14 days (Started 6/4/21) (21 May 2022 21:16)  chlorhexidine 0.12% mucous membrane liquid: 15 milliliter(s) mucous membrane 2 times a day (21 May 2022 21:16)  ertapenem 1 g injection: 1 gram(s) injectable once a day until 6/11 (10 Zay 2022 12:12)  Eucerin topical cream: Apply topically to affected area once a day bilateral feet (21 May 2022 21:16)  folic acid 1 mg oral tablet: 1 tab(s) orally once a day (21 May 2022 21:16)  furosemide 20 mg oral tablet: 1 tab(s) orally once a day (10 Zay 2022 12:12)  insulin glargine 100 units/mL subcutaneous solution: 8 unit(s) subcutaneous once a day (in the morning) (01 Jun 2022 08:24)  ipratropium-albuterol 0.5 mg-2.5 mg/3 mL inhalation solution: 3 milliliter(s) inhaled 4 times a day (21 May 2022 21:16)  LORazepam 1 mg oral tablet: 1 tab(s) by gastrostomy tube every 4 hours (21 May 2022 21:16)  methocarbamol 500 mg oral tablet: 1 tab(s) by gastrostomy tube 2 times a day (21 May 2022 21:16)  MiraLax oral powder for reconstitution:  (21 May 2022 23:14)  Multiple Vitamins oral tablet: 1 tab(s) orally once a day (21 May 2022 21:16)  nystatin 100,000 units/g topical powder: 1 application topically 3 times a day (29 May 2022 16:35)  omeprazole 20 mg oral delayed release capsule: orally 2 times a day (21 May 2022 23:14)  polyethylene glycol 3350 oral powder for reconstitution: 17 gram(s) by gastrostomy tube every 12 hours (21 May 2022 21:16)  senna 8.6 mg oral tablet: 3 tab(s) by gastrostomy tube once a day (at bedtime) (21 May 2022 21:16)  simethicone 80 mg oral tablet, chewable: 1 tab(s) by gastrostomy tube every 6 hours (21 May 2022 21:16)  Tylenol 325 mg oral tablet: 2 tab(s) by gastrostomy tube once a day; 60 minutes prior to dressing change  (21 May 2022 21:16)  Tylenol 325 mg oral tablet: 2 tab(s) by gastrostomy tube every 6 hours, As Needed (21 May 2022 21:16)      Family History: Non-contributory family history of premature cardiovascular atherosclerotic disease    Social History: No tobacco, alcohol or drug use    Review of Systems:  Unable to obtain    Physical Exam:  Vitals:        Vital Signs Last 24 Hrs  T(C): 36.7 (19 Aug 2022 07:33), Max: 37.6 (18 Aug 2022 20:18)  T(F): 98.1 (19 Aug 2022 07:33), Max: 99.7 (18 Aug 2022 20:18)  HR: 88 (19 Aug 2022 08:01) (77 - 138)  BP: 128/72 (19 Aug 2022 08:01) (81/65 - 181/88)  BP(mean): 88 (19 Aug 2022 08:01) (63 - 113)  RR: 27 (19 Aug 2022 08:01) (18 - 40)  SpO2: 100% (19 Aug 2022 08:01) (91% - 100%)  Parameters below as of 19 Aug 2022 07:10  Patient On (Oxygen Delivery Method): ventilator  General: Unresponsive  HEENT: Trach  Neck: No JVD, no carotid bruit  Lungs: Coarse bilaterally  CV: RRR, nl S1/S2, no M/R/G  Abdomen: S/NT/ND, +BS  Extremities: No LE edema, no cyanosis  Neuro: AAOx0  Skin: No rash    Labs:                        7.5    6.78  )-----------( 272      ( 19 Aug 2022 06:36 )             24.6     08-19    138  |  102  |  46<H>  ----------------------------<  146<H>  4.5   |  27  |  1.10    Ca    8.5      19 Aug 2022 06:36  Phos  3.1     08-19  Mg     2.7     08-19    TPro  7.0  /  Alb  2.0<L>  /  TBili  0.3  /  DBili  x   /  AST  11  /  ALT  11  /  AlkPhos  99  08-19        PT/INR - ( 18 Aug 2022 18:45 )   PT: 13.2 sec;   INR: 1.12 ratio         PTT - ( 18 Aug 2022 18:45 )  PTT:45.9 sec    Telemetry: Sinus rhythm    
[facility], Division of Infectious Diseases  MOIZ Lora S. Shah, Y. Patel, G. Casimir  [phone]  ([phone] - weekdays after 5pm and weekends)    BREA BECKHAM  76y, Female  [id number]    HPI--  HPI:  ***Patient with dementia and is non-verbal and is chronically trach/vented.  Patient is from facility and therefore information is obtained from chart.***  76F with dementia, COPD with chronic resp failure and is vent dependent, s/p PEG, hx of cardiac arrest, CHF, cor pulmonale, CVA, COVID-19 infxn, CKD, anemia, multiple admissions for respiratory distress (last admission from - diagnosed with PNA with parapneumonic pleural effusion s/p thoracentesis and Avycaz) who presents from [facility] for respiratory distress again.  [facility] transfer note only mention respiratory distress.  As per ED note, patient was noted to have increased dyspnea and worsening hypoxia.  No noted pain or reported fevers/chills.  In the ED, patient's triage vitals were /72  , RR 24, 100% on vent, T 99.7F.  Labs showed leukocytosis 25.06 (up from 6.85 on 6/10), anemia 9.8 (from 11.3), thrombocytosis 466 (from 264), hyperkalemia 5.7, lactic acid 3.7.  ABG was 7.48/34/168.  CT chest pending.  CXR showed possible consolidation on right lobe.  Patient started empirically on vancomycin and zosyn by ED.  Patient is being admitted for respiratory distress 2/2 PNA.  Currently patient is non-verbal and does not seem to be in any distress.   (18 Aug 2022 20:18)   at bedside states patient has had recurring bouts of PNA. Reports recent b/l thoracentesis.    ROS: Unable to obtain ROS 2/2 patient's mentation    Allergies: codeine (Hives)    PMH -- Dementia of frontal lobe type    Aphasic stroke    Diabetes mellitus    Respiratory failure    Hypertension    GERD (gastroesophageal reflux disease)    Constipation    Respiratory failure    CVA (cerebral vascular accident)    HTN (hypertension)    DM (diabetes mellitus)    Advanced dementia    COVID-19 virus detected    Quadriplegia    Pneumonia    Type II diabetes mellitus      PSH -- Hx of appendectomy    Gastrostomy in place    Tracheostomy in place    Tracheostomy tube present    Feeding by G-tube      FH -- No pertinent family history in first degree relatives    No pertinent family history in first degree relatives    No pertinent family history in first degree relatives    No pertinent family history in first degree relatives      Social History -- denies tobacco, alcohol or illicit drug use  Travel/Environmental/Occupational History: ***    Physical Exam--  Vital Signs Last 24 Hrs  T(F): 97.6 (19 Aug 2022 15:50), Max: 99.7 (18 Aug 2022 20:18)  HR: 97 (19 Aug 2022 17:33) (77 - 138)  BP: 107/58 (19 Aug 2022 17:00) (81/65 - 181/88)  RR: 25 (19 Aug 2022 17:00) (18 - 40)  SpO2: 100% (19 Aug 2022 17:33) (91% - 100%)  General: chronically ill appearing  HEENT: anicteric  Neck: trach, on ventilator  Lungs: decreased breath sounds b/l   Heart: S1, S2, normal rate. No murmur, rub or gallop.  Abdomen: Soft. Nondistended  Neuro: nonverbal  Extremities: No LE edema.   Skin: Warm. Dry. No rash    Laboratory & Imaging Data--  CBC:                       7.0    11.21 )-----------( 297      ( 19 Aug 2022 15:21 )             23.2     WBC Count: 11.21 K/uL (22 @ 15:21)  WBC Count: 6.78 K/uL (22 @ 06:36)  WBC Count: 25.06 K/uL (22 @ 18:45)    CMP:     138  |  102  |  46<H>  ----------------------------<  146<H>  4.5   |  27  |  1.10    Ca    8.5      19 Aug 2022 06:36  Phos  3.1       Mg     2.7         TPro  7.0  /  Alb  2.0<L>  /  TBili  0.3  /  DBili  x   /  AST  11  /  ALT  11  /  AlkPhos  99      LIVER FUNCTIONS - ( 19 Aug 2022 06:36 )  Alb: 2.0 g/dL / Pro: 7.0 g/dL / ALK PHOS: 99 U/L / ALT: 11 U/L DA / AST: 11 U/L / GGT: x           Urinalysis Basic - ( 18 Aug 2022 20:00 )    Color: Yellow / Appearance: Clear / S.015 / pH: x  Gluc: x / Ketone: Trace  / Bili: Negative / Urobili: Negative mg/dL   Blood: x / Protein: 100 mg/dL / Nitrite: Negative   Leuk Esterase: Trace / RBC: 3-5 /HPF / WBC 3-5   Sq Epi: x / Non Sq Epi: Moderate / Bacteria: Few      Microbiology:     Radiology--  ***  Active Medications--  acetaminophen     Tablet .. 650 milliGRAM(s) Oral every 6 hours PRN  albuterol/ipratropium for Nebulization 3 milliLiter(s) Nebulizer every 6 hours  aluminum hydroxide/magnesium hydroxide/simethicone Suspension 30 milliLiter(s) Oral every 4 hours PRN  chlorhexidine 0.12% Liquid 15 milliLiter(s) Oral Mucosa every 12 hours  chlorhexidine 0.12% Liquid 15 milliLiter(s) Oral Mucosa every 12 hours  chlorhexidine 4% Liquid 1 Application(s) Topical <User Schedule>  collagenase Ointment 1 Application(s) Topical daily  dextrose 5%. 1000 milliLiter(s) IV Continuous <Continuous>  dextrose 5%. 1000 milliLiter(s) IV Continuous <Continuous>  dextrose 50% Injectable 25 Gram(s) IV Push once  dextrose 50% Injectable 12.5 Gram(s) IV Push once  dextrose 50% Injectable 25 Gram(s) IV Push once  dextrose Oral Gel 15 Gram(s) Oral once PRN  folic acid 1 milliGRAM(s) Oral daily  furosemide   Injectable 40 milliGRAM(s) IV Push once  glucagon  Injectable 1 milliGRAM(s) IntraMuscular once  heparin   Injectable 5000 Unit(s) SubCutaneous every 12 hours  insulin glargine Injectable (LANTUS) 8 Unit(s) SubCutaneous every morning  insulin lispro (ADMELOG) corrective regimen sliding scale   SubCutaneous every 6 hours  lactobacillus acidophilus 1 Tablet(s) Oral daily  LORazepam     Tablet 2 milliGRAM(s) Oral every 4 hours  melatonin 3 milliGRAM(s) Oral at bedtime PRN  methocarbamol 500 milliGRAM(s) Oral two times a day  multivitamin 1 Tablet(s) Oral daily  nystatin Powder 1 Application(s) Topical three times a day  ondansetron Injectable 4 milliGRAM(s) IV Push every 8 hours PRN  pantoprazole  Injectable 40 milliGRAM(s) IV Push daily  piperacillin/tazobactam IVPB. 3.375 Gram(s) IV Intermittent once  piperacillin/tazobactam IVPB.. 3.375 Gram(s) IV Intermittent every 8 hours  polyethylene glycol 3350 17 Gram(s) Oral every 12 hours  povidone iodine 10% Solution 1 Application(s) Topical daily  senna 3 Tablet(s) Oral at bedtime  simethicone 80 milliGRAM(s) Chew every 6 hours  sodium chloride 0.9% lock flush 10 milliLiter(s) IV Push every 1 hour PRN  trimethoprim  160 mG/sulfamethoxazole 800 mG 2 Tablet(s) Oral two times a day    Antimicrobials:   piperacillin/tazobactam IVPB. 3.375 Gram(s) IV Intermittent once  piperacillin/tazobactam IVPB.. 3.375 Gram(s) IV Intermittent every 8 hours  trimethoprim  160 mG/sulfamethoxazole 800 mG 2 Tablet(s) Oral two times a day    Immunologic:

## 2022-08-24 NOTE — PROGRESS NOTE ADULT - ASSESSMENT
77yo F with PMH of dementia, COPD with chronic resp failure and is vent dependent, s/p PEG, hx of cardiac arrest, diastolic CHF, cor pulmonale, hx of CVA, COVID-19 infxn, CKD, anemia, multiple admissions for respiratory distress (recent admission from 6/1-6/9 diagnosed with PNA with parapneumonic pleural effusion s/p thoracentesis and Avycaz) who presents from [facility] for respiratory distress again a/w sepsis and respiratory failure due to suspected recurrent gram-negative PNA.    Severe sepsis and acute hypoxic respiratory failure 2/2 gram-negative PNA   - continue current vent settings (on AC with RR 16, , PEEP 5, FiO2 100%) maintain O2 sat >92%  - was on ertapenem, as per ID changed to zosyn+bactrim for coverage of prior admissions cultures of MDR Serratia and CRE Pseudomonas  - lactate downtrending, procalcitonin high but improving with treatment (4.76 -> 3.34 -> 1.77)  - cautious use of fluids given hx of CHF (received 1750cc of NS in ED)  - f/u blood cultures (NGTD), urine culture pending   - f/u trach sputum culture - has GNR growing awaiting speciation / sensitivities  - has hx of respiratory distress driven by vent asynchrony and would require IV benzos ; trialed IV fentanyl with adequate effect this evening  - Checked CT Abd/P to eval for any other potential source sign of infection and found no significant sign of intra-abdominal infection on CT  - cc/pulm consult (Jaime), recs appreciated  - ID ([facility]), recs appreciated  - palliative care (Emre), recs appreciated - pt is full code    Diastolic CHF  B/L pleural effusions  - likely is a component of pulmonary edema given hx of CHF, given lasix 40mg IV x1 post-transfusion  - last TTE was 5/30 with EF 65% with normal LVSF, dilated RV, and moderate pHTN -> repeat TTE appears unchanged  - cardiology consult (Deepak), recs appreciated -rec to hold diuretic for today  - may need repeat thoracentesis (had 1.5L drained two admissions ago), pulmonology consulted; was parapneumonic on that admission as opposed to transudative and thus less likely related to CHF    PEG tube leak  - Nursing reported small amount of tube feed leak around the PEG tube entry site.   - Consulted GI (Coral), recs appreciated  - pt did have tube replaced on a recent admission    Anemia of chronic disease  - received 1 unit of pRBC on 8/19 for Hgb of 7.0 with appropriate response in Hgb -- now Hgb with downtrend to 7.5  - has been evaluated by GI and hematology in the past -> dx with ACD with intermittent GI bleeding  - negative occult blood   - monitor H&H, if continues to trend down - will give another transfusion  - pt may need Retacrit -- Cr is 1.3 with eGFR of 41, but given pt has low muscle mass from being bedbound for years, this GFR estimate is likely falsely high  - consider nephro eval to assess if appropriate to give Retacrit    Hyperkalemia  - given both insulin and lasix   - resolved; monitor BMP    Hx of CKD stage 3 - near baseline of 1.2-1.3  - renally dose medications and monitor BMP and electrolytes   - Cr is 1.3 with eGFR of 41, but given pt has low muscle mass from being bedbound for years, this GFR estimate is likely falsely high  - consider nephro eval     HTN  - not on any BP meds at facility  - monitor VS    DM2  - NPO with TF  - continue with insulin 8units qAM as per last admission  - c/w moderate insulin coverage scale  - goal -180    Diet  - continue with same TF from last admission    Preventive measures  - cont with heparin subq for DVT ppx  - Full Code

## 2022-08-24 NOTE — PROGRESS NOTE ADULT - ASSESSMENT
REVIEW OF SYMPTOMS      Able to give (reliable) ROS  NO     PHYSICAL EXAM    HEENT Unremarkable  atraumatic   RESP Fair air entry EXP prolonged    Harsh breath sound Resp distres mild   CARDIAC S1 S2 No S3     NO JVD    ABDOMEN SOFT BS PRESENT NOT DISTENDED No hepatosplenomegaly   PEDAL EDEMA present No calf tenderness  NO rash       AGE/SEX.   76 f  DOA.  8/18/2022  CC .  8/18/2022 respiratory failure, chronic trach vent, full code, recent Pneumonia/pleural effusion  shortness of breath    PROCEDURE  8/19/2022 [facility]     HOSPITAL COURSE.   WOUND CARE 8/18/2022   PNEUMONIA 8/18/2022 8/19 Tmax 101  LACTICEMIA 8/18/2022  VENT DEPENDENT  RESP FAILURE 8/18/2022    ANEMIA 8/18-8/19/2022 Hb 9.8 - 7    HYPERKALEMIA 8/18/2022   RYAN 8/18/2022   DM     PMH .  pmh Pneumonia    pmh 5/2/2022 SERRATIA CARBAPENEM RESISTANT PSEUDOMONAS   pmh HTN,   pmh DM,   pmh CVA,   pmh  chronic respiratory failure   pmh s/p trach, PEG,   pmh cardiac arrest  pmh Pl effsn  r PL EFFSN   6/8/2022 r thorac g 161 l 222 p 4.9 p 10 l 16 .38    l pl effsn  5/25/2022 g 183 l 144/381 .37 p 3.4/5.3 .64 p 23 l 36   5/25/2022l pl fluid  lymph pred exudate   cyto (-)         GENERAL DATA .   GOC.  8/18/2022 full code      ALLGY.   codeine                          WT.     8/18/2022 63                               BMI.     8/18/2022 23                         ICU STAY. 8/18/2022  COVID. 8/18/2022 scv2 (-)       BEST PRACTICE ISSUES.    HOB ELEVATN. Yes  DVT PPLX.  8/18/2022 hpsc     SQUIRES PPLX.  8/18/2022 protonix 40     INFN PPLX. 8/18/2022 chlorhexidine .12%     8/19/2022 chlorhexidine 4%   SP SW EM.        DIET. 8/18/2022 gflucerna 1200 gt                 VS/ PO/IO/ VENT/ DRIPS.   8/24/2022 afeb 68 100/50   8/24/2022 ac 16/400/40/5     ASSESSMENT/RECOMMENDATIONS .   RESP.  VENT MANAGEMENT.  HOB elevation  Target Pplat 30 (-)  Target PO 90-95%  Target pH 730 (+)  Daily spontaneous breathing trials   Daily sedation vacation     GAS EXCHANGE.  vbg 8/18/2022 vbg pH 737   8/18/2022 8p   vent 100% 16/400 748/34/168     SEDATION.  8/20/2022 fentanyl 25.6 p    COPD.   8/18/2022 duoneb   8/19 nacl 7% bid     INFECTION.  WOUND CARE  8/18/2022 povidone l and r great toe   8/19/2022 collagenase buttocks     PNEUMONIA .  w 8/18-8/19-8/20-8/22/2022 w 25 - 11 - 14 - 7.5  ua 8/18/2022 w 3-5   pr 8/20-8/21-8/22/2022 pr 3.3 - 1.7 - 1.2   ct  8/18   persistent mod bl effs   underlying dependent airspace consolidation   septal thickening and patchy ground glass opacities maddie r lung   ctap nc 8/20/2022 (-)   rvp 8/18/2022 (-)  bc 8/18/2022 (-)   mrsa 8/19/2022 (-)   8/19/2022  zosyn Se Vignesh  8/19/2022 bactrim ds 2 bid   a/r.    PAST MICROBIO.   5/2/2022 SERRATIA CARBAPENEM RESISTANT PSEUDOMONAS     PLEURAL EFFSN.  ct  8/18   persistent mod bl effs     CARDIAC.  LACTICEMIA.  la 8/18-8/19/2022 la 3.7- 1.2     CHF.  bnp 8/19-8/22/2022 bnp 14691  -  3505   echo 8/19/2022 n lvsf dd1 pasp 58   8/19/2022 lasix 40 one dose   8/24/2022 lasix 20     GI.  HEMAT.  ANEMIA.   Hb 8/18-8/19-8/20-8/21-7/22-8/23/2022   Hb 9.8 - 7-8.9   - 7.9 - 7.5 - 8.9   8/19/2022 7:21 PM recheck Hb   target Hb 7 (+)       TRANSFUSION   8/19 1 u prbc    RENAL.  RYAN.  Cr 8/18-8/19- 8/20-8/21-8/22/2022   Cr 1.2- 1.1 - 1.3 - 1.3 - 1.3   monitor    IV fl.  none     ENDOCRINE.   DM.  8/18/2022 Insulin     NEURO.  8/19/2022 lorazepam 2.6   8/19/2022 methocarbamol 500.2       TIME SPENT   Over 39 minutes aggregate critical  care time spent on encounter; activities included   direct patient care, counseling and/or coordinating care reviewing notes, lab data/ imaging , discussion with multidisciplinary team/ patient  /family and explaining in detail risks, benefits, alternatives  of the recommendations     CHAPINCITO GUIDRY 76 f [facility] S 8/18/2022   DR JOSEPH DU

## 2022-08-24 NOTE — CONSULT NOTE ADULT - ASSESSMENT
Patient presents with   1. Gluteal fold unstageable pressure injury 5 x 5 x 2 yellow slough no probe to the esfa150% scant/moderate serosanguinous drainage no odor, periwound erythema  recommendation:   -Santyl daily  2. Sacral area scar tissue likely a  resolved stage 3/4 pressure injury  -as per prevention protocol  3. Left hallux unstageable pressure injury 1.5 x 1.5 dry, periwound dry mild erythema  recommendation:   -Continue Betadine daily  4. Right hallux unstageable pressure injury 1.5 x 1.5 dry, periwound dry mild erythema  recommendation:   -Continue Betadine daily  5. Right lateral foot unstageable pressure injury resolved callous, periwound dry mild erythema  recommendation:  -Continue Betadine daily  6. Right lateral/plantar foot unstageable pressure injury resolved with callous dry, periwound dry mild erythema  recommendation:  -Continue Betadine daily  Patient is on a low air loss mattress  for the critically ill patient experiencing multiorgan failure, impaired tissue oxygenation, and perfusion can contribute to skin failure thus the development of a pressure related injury may be unavoidable    This pressure injury is community acquired/YES  At risk for altered tissue perfusion /YES  Impaired perfusion of peripheral tissue /YES  Continue  Nutrition (as tolerated)  Continue  Offloading   Continue Pericare  Apply cair boots at all times while in bed.   Provide skin checks and foot placement q8h.  Care as per medicine will follow w/ you  Follow up as outpatient [facility]   Findings and recommendations discussed with BRANDEN Madrid  Thank you for this consult  Ana Luisa Cantu NP, [facility] [phone]

## 2022-08-24 NOTE — PROGRESS NOTE ADULT - SUBJECTIVE AND OBJECTIVE BOX
INTERVAL HPI/OVERNIGHT EVENTS:  No new overnight event.  No N/V/D.  Tolerating diet.  distended abd     Allergies    codeine (Hives)    Intolerances    General:  No wt loss, fevers, chills, night sweats, fatigue,   Eyes:  Good vision, no reported pain  ENT:  No sore throat, pain, runny nose, dysphagia  CV:  No pain, palpitations, hypo/hypertension  Resp:  No dyspnea, cough, tachypnea, wheezing  GI:  No pain, No nausea, No vomiting, No diarrhea, No constipation, No weight loss, No fever, No pruritis, No rectal bleeding, No tarry stools, No dysphagia,  :  No pain, bleeding, incontinence, nocturia  Muscle:  No pain, weakness  Neuro:  No weakness, tingling, memory problems  Psych:  No fatigue, insomnia, mood problems, depression  Endocrine:  No polyuria, polydipsia, cold/heat intolerance  Heme:  No petechiae, ecchymosis, easy bruisability  Skin:  No rash, tattoos, scars, edema      PHYSICAL EXAM:   Vital Signs:  Vital Signs Last 24 Hrs  T(C): 36.9 (24 Aug 2022 04:02), Max: 36.9 (23 Aug 2022 15:55)  T(F): 98.5 (24 Aug 2022 04:02), Max: 98.5 (24 Aug 2022 04:02)  HR: 63 (24 Aug 2022 07:00) (58 - 116)  BP: 98/59 (24 Aug 2022 07:00) (85/53 - 195/61)  BP(mean): 71 (24 Aug 2022 07:00) (61 - 128)  RR: 16 (24 Aug 2022 07:00) (14 - 37)  SpO2: 99% (24 Aug 2022 07:00) (91% - 100%)    Parameters below as of 24 Aug 2022 06:00  Patient On (Oxygen Delivery Method): ventilator      Daily     Daily Weight in k.9 (24 Aug 2022 04:02)I&O's Summary    23 Aug 2022 07:01  -  24 Aug 2022 07:00  --------------------------------------------------------  IN: 1085 mL / OUT: 1350 mL / NET: -265 mL        GENERAL:  Appears stated age, well-groomed, well-nourished, no distress  HEENT:  NC/AT,  conjunctivae clear and pink, no thyromegaly, nodules, adenopathy, no JVD, sclera -anicteric  CHEST:  Full & symmetric excursion, no increased effort, breath sounds clear  HEART:  Regular rhythm, S1, S2, no murmur/rub/S3/S4, no abdominal bruit, no edema  ABDOMEN:  Soft, non-tender, non-distended, normoactive bowel sounds,  no masses ,no hepato-splenomegaly, no signs of chronic liver disease  EXTEREMITIES:  no cyanosis,clubbing or edema  SKIN:  No rash/erythema/ecchymoses/petechiae/wounds/abscess/warm/dry  NEURO:  Alert, oriented, no asterixis, no tremor, no encephalopathy      LABS:                        8.9    10.48 )-----------( 391      ( 23 Aug 2022 06:00 )             29.9     08-    135  |  101  |  30<H>  ----------------------------<  158<H>  5.4<H>   |  26  |  1.32<H>    Ca    8.8      23 Aug 2022 06:00          amylase   lipase  RADIOLOGY & ADDITIONAL TESTS:

## 2022-08-24 NOTE — PROGRESS NOTE ADULT - SUBJECTIVE AND OBJECTIVE BOX
Patient is a 76y old  Female who presents with a chief complaint of respiratory distress (18 Aug 2022 20:18)      BRIEF HOSPITAL COURSE: Patient is a 75 yo female with a pmh of dementia, COPD with chronic resp failure;  vent dependent, s/p PEG, cardiac arrest, CHF, cor pulmonale, CVA, COVID-19, CKD, anemia, multiple admissions for respiratory distress (last admission from - diagnosed with PNA with parapneumonic pleural effusion s/p thoracentesis and Avycaz) who presented to  ED on 22 from [facility] with respiratory distress. In Ed patient was noted to have dyspnea and hypoxia. Work up revealed WBC 25.06, platelet 466, K 5.7, lactate 3.7, ABG 7.48/34/168. Patient was given empiric abx with vanco and zosyn, along with 1750 cc of IVF. Of note, patient admitted with outpatient midline. Patient admitted to SPCU.      Events last 24 hours: Patient remains on mechanical ventilation. Now with hyperkalemia.    PAST MEDICAL & SURGICAL HISTORY:  Dementia of frontal lobe type  Aphasic stroke  Diabetes mellitus  Respiratory failure  Hypertension  GERD (gastroesophageal reflux disease)  Constipation  Respiratory failure  CVA (cerebral vascular accident)  HTN (hypertension)  DM (diabetes mellitus)  Advanced dementia  COVID-19 virus detected  Quadriplegia  Pneumonia  Type II diabetes mellitus  Hx of appendectomy  Gastrostomy in place  Tracheostomy in place  Tracheostomy tube present  Feeding by G-tube      Review of Systems:  Unable to obtain d/t clinical condition       Medications:  ertapenem  IVPB 1000 milliGRAM(s) IV Intermittent every 24 hours  albuterol/ipratropium for Nebulization 3 milliLiter(s) Nebulizer every 6 hours PRN  acetaminophen     Tablet .. 650 milliGRAM(s) Oral every 6 hours PRN  fentaNYL    Injectable 50 MICROGram(s) IV Push once  LORazepam     Tablet 2 milliGRAM(s) Oral every 4 hours  LORazepam   Injectable 2 milliGRAM(s) IV Push every 4 hours PRN  melatonin 3 milliGRAM(s) Oral at bedtime PRN  methocarbamol 500 milliGRAM(s) Oral two times a day  ondansetron Injectable 4 milliGRAM(s) IV Push every 8 hours PRN  heparin   Injectable 5000 Unit(s) SubCutaneous every 12 hours  aluminum hydroxide/magnesium hydroxide/simethicone Suspension 30 milliLiter(s) Oral every 4 hours PRN  pantoprazole  Injectable 40 milliGRAM(s) IV Push daily  polyethylene glycol 3350 17 Gram(s) Oral every 12 hours  senna 3 Tablet(s) Oral at bedtime  simethicone 80 milliGRAM(s) Chew every 6 hours  dextrose 50% Injectable 25 Gram(s) IV Push once  dextrose 50% Injectable 12.5 Gram(s) IV Push once  dextrose 50% Injectable 25 Gram(s) IV Push once  dextrose Oral Gel 15 Gram(s) Oral once PRN  glucagon  Injectable 1 milliGRAM(s) IntraMuscular once  insulin lispro (ADMELOG) corrective regimen sliding scale   SubCutaneous every 6 hours  dextrose 5%. 1000 milliLiter(s) IV Continuous <Continuous>  dextrose 5%. 1000 milliLiter(s) IV Continuous <Continuous>  folic acid 1 milliGRAM(s) Oral daily  multivitamin 1 Tablet(s) Oral daily  chlorhexidine 0.12% Liquid 15 milliLiter(s) Oral Mucosa every 12 hours  chlorhexidine 0.12% Liquid 15 milliLiter(s) Oral Mucosa every 12 hours  collagenase Ointment 1 Application(s) Topical daily  nystatin Powder 1 Application(s) Topical three times a day  povidone iodine 10% Solution 1 Application(s) Topical daily  lactobacillus acidophilus 1 Tablet(s) Oral daily    Mode: AC/ CMV (Assist Control/ Continuous Mandatory Ventilation)  RR (machine): 16  TV (machine): 400  FiO2: 100  PEEP: 5  ITime: 1  MAP: 11  PIP: 28    ICU Vital Signs Last 24 Hrs  T(C): 37.6 (18 Aug 2022 20:18), Max: 37.6 (18 Aug 2022 20:18)  T(F): 99.7 (18 Aug 2022 20:18), Max: 99.7 (18 Aug 2022 20:18)  HR: 98 (18 Aug 2022 20:18) (98 - 138)  BP: 104/58 (18 Aug 2022 20:18) (104/58 - 161/72)  BP(mean): --  ABP: --  ABP(mean): --  RR: 35 (18 Aug 2022 20:18) (24 - 40)  SpO2: 100% (18 Aug 2022 20:18) (91% - 100%)    O2 Parameters below as of 18 Aug 2022 19:14  Patient On (Oxygen Delivery Method): tracheostomy collar  O2 Flow (L/min): 15    ABG - ( 18 Aug 2022 20:06 )  pH, Arterial: 7.48  pH, Blood: x     /  pCO2: 34    /  pO2: 168   / HCO3: 25    / Base Excess: 1.8   /  SaO2: 98.8      I&O's Detail    LABS:                        9.8    25.06 )-----------( 466      ( 18 Aug 2022 18:45 )             32.9         135  |  101  |  50<H>  ----------------------------<  278<H>  5.7<H>   |  24  |  1.26    Ca    9.6      18 Aug 2022 18:45    TPro  8.9<H>  /  Alb  2.3<L>  /  TBili  0.4  /  DBili  x   /  AST  21  /  ALT  16  /  AlkPhos  145<H>      CAPILLARY BLOOD GLUCOSE    POCT Blood Glucose.: 286 mg/dL (18 Aug 2022 22:02)    PT/INR - ( 18 Aug 2022 18:45 )   PT: 13.2 sec;   INR: 1.12 ratio         PTT - ( 18 Aug 2022 18:45 )  PTT:45.9 sec  Urinalysis Basic - ( 18 Aug 2022 20:00 )    Color: Yellow / Appearance: Clear / S.015 / pH: x  Gluc: x / Ketone: Trace  / Bili: Negative / Urobili: Negative mg/dL   Blood: x / Protein: 100 mg/dL / Nitrite: Negative   Leuk Esterase: Trace / RBC: 3-5 /HPF / WBC 3-5   Sq Epi: x / Non Sq Epi: Moderate / Bacteria: Few    CULTURES:  Rapid RVP Result: NotDetec (22 @ 19:14)      Physical Examination:    General: Unresponsive, not following commands, ill appearing.    HEENT: Pupils equal, reactive to light.  Symmetric.    PULM: B/l diminished breath sounds, fine crackles.     CVS: Regular rate and rhythm, no murmurs, rubs, or gallops    ABD: Soft, mild distension, present bowel sounds, PEG    EXT: trace edema    SKIN: Pressure injury to coccyx, b/l first toes with ? DTI.    NEURO: Unresponsive, does not follow commands, rigidity, quadriplegic       TIME SPENT: 38 minutes  Evaluating/treating patient, reviewing data/labs/imaging, discussing case with multidisciplinary team, discussing plan/goals of care with patient/family. Non-inclusive of procedure time.

## 2022-08-24 NOTE — PROGRESS NOTE ADULT - SUBJECTIVE AND OBJECTIVE BOX
[facility], Division of Infectious Diseases  MOIZ Lora Y. Patel, S. Shah, [facility]. [facility]  [phone]    Name: BREA BECKHAM  Age: 76y  Gender: Female  MRN: [id number]    Interval History:  Patient seen and examined at bedside this morning  No acute overnight events. Afebrile  Notes reviewed    Antibiotics:  piperacillin/tazobactam IVPB.. 3.375 Gram(s) IV Intermittent every 8 hours  trimethoprim  160 mG/sulfamethoxazole 800 mG 2 Tablet(s) Oral two times a day      Medications:  acetaminophen     Tablet .. 650 milliGRAM(s) Oral every 6 hours PRN  albuterol/ipratropium for Nebulization 3 milliLiter(s) Nebulizer every 6 hours  aluminum hydroxide/magnesium hydroxide/simethicone Suspension 30 milliLiter(s) Oral every 4 hours PRN  chlorhexidine 0.12% Liquid 15 milliLiter(s) Oral Mucosa every 12 hours  chlorhexidine 2% Cloths 1 Application(s) Topical daily  collagenase Ointment 1 Application(s) Topical daily  dextrose 5%. 1000 milliLiter(s) IV Continuous <Continuous>  dextrose 5%. 1000 milliLiter(s) IV Continuous <Continuous>  dextrose 50% Injectable 25 Gram(s) IV Push once  dextrose 50% Injectable 12.5 Gram(s) IV Push once  dextrose 50% Injectable 25 Gram(s) IV Push once  dextrose Oral Gel 15 Gram(s) Oral once PRN  fentaNYL    Injectable 25 MICROGram(s) IV Push every 4 hours PRN  folic acid 1 milliGRAM(s) Oral daily  furosemide    Tablet 20 milliGRAM(s) Oral daily  glucagon  Injectable 1 milliGRAM(s) IntraMuscular once  heparin   Injectable 5000 Unit(s) SubCutaneous every 12 hours  insulin glargine Injectable (LANTUS) 8 Unit(s) SubCutaneous every morning  insulin lispro (ADMELOG) corrective regimen sliding scale   SubCutaneous every 6 hours  lactobacillus acidophilus 1 Tablet(s) Oral daily  LORazepam     Tablet 2 milliGRAM(s) Oral every 4 hours  melatonin 3 milliGRAM(s) Oral at bedtime PRN  methocarbamol 500 milliGRAM(s) Oral two times a day  multivitamin 1 Tablet(s) Oral daily  nystatin Powder 1 Application(s) Topical three times a day  ondansetron Injectable 4 milliGRAM(s) IV Push every 8 hours PRN  pantoprazole  Injectable 40 milliGRAM(s) IV Push daily  piperacillin/tazobactam IVPB.. 3.375 Gram(s) IV Intermittent every 8 hours  polyethylene glycol 3350 17 Gram(s) Oral every 12 hours  povidone iodine 10% Solution 1 Application(s) Topical daily  senna 3 Tablet(s) Oral at bedtime  simethicone 80 milliGRAM(s) Chew every 6 hours  sodium chloride 0.9% lock flush 10 milliLiter(s) IV Push every 1 hour PRN  sodium chloride 7% Inhalation 4 milliLiter(s) Inhalation every 12 hours  trimethoprim  160 mG/sulfamethoxazole 800 mG 2 Tablet(s) Oral two times a day      Review of Systems:  unable to obtain    Allergies: codeine (Hives)    For details regarding the patient's past medical history, social history, family history, and other miscellaneous elements, please refer the initial infectious diseases consultation and/or the admitting history and physical examination for this admission.    Objective:  Vitals:   T(C): 36.6 (08-24-22 @ 08:30), Max: 36.9 (08-23-22 @ 15:55)  HR: 83 (08-24-22 @ 11:10) (58 - 116)  BP: 117/56 (08-24-22 @ 10:00) (85/53 - 195/61)  RR: 22 (08-24-22 @ 10:00) (14 - 37)  SpO2: 100% (08-24-22 @ 11:10) (91% - 100%)    Mode: AC/ CMV (Assist Control/ Continuous Mandatory Ventilation)  RR (machine): 16  TV (machine): 400  FiO2: 40  PEEP: 5  ITime: 1  MAP: 12  PIP: 28      Physical Examination:  General: no acute distress  HEENT: NC/AT  Neck: trach  Cardio: RRR  Resp: MV  breath sounds  Abd: soft, NT, ND  Neuro: no obvious focal deficits  Ext: no edema or cyanosis  Skin: warm, dry, no visible rash    Laboratory Studies:  CBC:                       8.9    10.48 )-----------( 391      ( 23 Aug 2022 06:00 )             29.9     CMP: 08-23    135  |  101  |  30<H>  ----------------------------<  158<H>  5.4<H>   |  26  |  1.32<H>    Ca    8.8      23 Aug 2022 06:00            Microbiology: reviewed    Culture - Sputum (collected 08-19-22 @ 20:45)  Source: ET Tube ET Tube  Gram Stain (08-20-22 @ 03:35):    Few polymorphonuclear leukocytes per low power field    Rare Squamous epithelial cells per low power field    Few Gram Negative Rods per oil power field  Final Report (08-22-22 @ 17:12):    Few Pseudomonas aeruginosa (Carbapenem Resistant)    Moderate Stenotrophomonas maltophilia    Normal Respiratory Elvira present  Organism: Pseudomonas aeruginosa (Carbapenem Resistant)  Stenotrophomonas maltophilia (08-22-22 @ 17:12)  Organism: Stenotrophomonas maltophilia (08-22-22 @ 17:12)      -  Levofloxacin: S <=0.5      -  Trimethoprim/Sulfamethoxazole: S <=0.5/9.5      Method Type: PRATIK  Organism: Pseudomonas aeruginosa (Carbapenem Resistant) (08-22-22 @ 17:12)      -  Amikacin: S <=16      -  Aztreonam: R >16      -  Cefepime: R >16      -  Ceftazidime: R >16      -  Ceftazidime/Avibactam: R >16      -  Ceftolozane/tazobactam: S <=2      -  Ciprofloxacin: I 1      -  Gentamicin: S <=2      -  Imipenem: R >8      -  Levofloxacin: S <=0.5      -  Meropenem: R >8      -  Piperacillin/Tazobactam: I 64      -  Tobramycin: I 8      Method Type: PRATIK    Culture - Urine (collected 08-18-22 @ 20:00)  Source: Clean Catch Clean Catch (Midstream)  Final Report (08-21-22 @ 21:14):    10,000 - 49,000 CFU/mL Candida albicans "Susceptibilities not performed"    Culture - Blood (collected 08-18-22 @ 19:14)  Source: .Blood Blood-Peripheral  Final Report (08-24-22 @ 01:01):    No Growth Final    Culture - Blood (collected 08-18-22 @ 18:45)  Source: .Blood Blood-Peripheral  Final Report (08-24-22 @ 01:01):    No Growth Final        Radiology: reviewed

## 2022-08-24 NOTE — PROGRESS NOTE ADULT - ASSESSMENT
Patient is a 77 yo female with a pmh of dementia, COPD with chronic resp failure;  vent dependent, s/p PEG, cardiac arrest, CHF, cor pulmonale, CVA, COVID-19, CKD, anemia, who presented to  ED with resp distress, admitted to [facility].    Problem:  - Acute on chronic hypoxic resp failure  - Sepsis  - Pleural effusions   - Systolic HF exacerbation / pulm edema  - Hyperkalemia   - Anemia   - RYAN on CKD      Plan:  Neuro: On ativan PO outpatient for comfort / vent compliance. PRN Fent.  Respiratory: Patient currently on Full vent support, vent setting 16/400/5/40 -> titrated fio2 to 40%. Titrate settings to maintain SaO2 >90%, and pH >7.25, consider low tidal volume ventilation strategy w/ goal Tv 4-8 cc/kg of ideal body weight, plateau pressure goal <30. Peridex oral care. B/l pleural effusions.  c/w duonebs for wheezing and hx of COPD. Hypersal to assist in thinning secretions.   Cardiac: RSR. Prior hx of CHF exacerbation. C/w Lasix.  GI: NPO except TF. Mild distension noted on exam. Assess residuals. CT a/p on 8/20 with no acute findings.  /Renal: Noted to have hx of CKD, avoid nephrotoxic agents. Hyperkalemic at 5.9. - has been up trending, will give insulin and dextrose. Woody catheter for accurate I&O while diuresis. Trend cmp and K, replace electrolytes as needed.  ID: D following, abx zosyn and bactrim. Adjust abx on new cultures growth and sensitivity. Trend CBC, repeat lactate. Assess for fevers.  Hem/Onc: Anemia, though no s/s of bleeding, FOBT negative.  Continue to monitor for s/s of bleeding at this time. VTE ppx with sub q heparin. Received PRBC during hospital course. Transfuse if hgb <7.  Endo: Goal glucose 100-180. q6 blood glucose monitoring.   Dispo: Full code, [facility].

## 2022-08-24 NOTE — PROGRESS NOTE ADULT - SUBJECTIVE AND OBJECTIVE BOX
Date/Time Patient Seen:  		  Referring MD:   Data Reviewed	       Patient is a 76y old  Female who presents with a chief complaint of respiratory distress (23 Aug 2022 22:45)      Subjective/HPI     PAST MEDICAL & SURGICAL HISTORY:  Dementia of frontal lobe type    Aphasic stroke    Diabetes mellitus    Respiratory failure    Hypertension    GERD (gastroesophageal reflux disease)    Constipation    Respiratory failure    CVA (cerebral vascular accident)    HTN (hypertension)    DM (diabetes mellitus)    Advanced dementia    COVID-19 virus detected    Quadriplegia    Pneumonia    Type II diabetes mellitus    Hx of appendectomy    Gastrostomy in place    Tracheostomy in place    Tracheostomy tube present    Feeding by G-tube          Medication list         MEDICATIONS  (STANDING):  albuterol/ipratropium for Nebulization 3 milliLiter(s) Nebulizer every 6 hours  chlorhexidine 0.12% Liquid 15 milliLiter(s) Oral Mucosa every 12 hours  chlorhexidine 2% Cloths 1 Application(s) Topical daily  collagenase Ointment 1 Application(s) Topical daily  dextrose 5%. 1000 milliLiter(s) (50 mL/Hr) IV Continuous <Continuous>  dextrose 5%. 1000 milliLiter(s) (100 mL/Hr) IV Continuous <Continuous>  dextrose 50% Injectable 25 Gram(s) IV Push once  dextrose 50% Injectable 12.5 Gram(s) IV Push once  dextrose 50% Injectable 25 Gram(s) IV Push once  folic acid 1 milliGRAM(s) Oral daily  furosemide    Tablet 20 milliGRAM(s) Oral daily  glucagon  Injectable 1 milliGRAM(s) IntraMuscular once  heparin   Injectable 5000 Unit(s) SubCutaneous every 12 hours  insulin glargine Injectable (LANTUS) 8 Unit(s) SubCutaneous every morning  insulin lispro (ADMELOG) corrective regimen sliding scale   SubCutaneous every 6 hours  lactobacillus acidophilus 1 Tablet(s) Oral daily  LORazepam     Tablet 2 milliGRAM(s) Oral every 4 hours  methocarbamol 500 milliGRAM(s) Oral two times a day  multivitamin 1 Tablet(s) Oral daily  nystatin Powder 1 Application(s) Topical three times a day  pantoprazole  Injectable 40 milliGRAM(s) IV Push daily  piperacillin/tazobactam IVPB.. 3.375 Gram(s) IV Intermittent every 8 hours  polyethylene glycol 3350 17 Gram(s) Oral every 12 hours  povidone iodine 10% Solution 1 Application(s) Topical daily  senna 3 Tablet(s) Oral at bedtime  simethicone 80 milliGRAM(s) Chew every 6 hours  sodium chloride 7% Inhalation 4 milliLiter(s) Inhalation every 12 hours  trimethoprim  160 mG/sulfamethoxazole 800 mG 2 Tablet(s) Oral two times a day    MEDICATIONS  (PRN):  acetaminophen     Tablet .. 650 milliGRAM(s) Oral every 6 hours PRN Temp greater or equal to 38C (100.4F), Mild Pain (1 - 3)  aluminum hydroxide/magnesium hydroxide/simethicone Suspension 30 milliLiter(s) Oral every 4 hours PRN Dyspepsia  dextrose Oral Gel 15 Gram(s) Oral once PRN Blood Glucose LESS THAN 70 milliGRAM(s)/deciliter  fentaNYL    Injectable 25 MICROGram(s) IV Push every 4 hours PRN Moderate Pain (4 - 6), agitation  melatonin 3 milliGRAM(s) Oral at bedtime PRN Insomnia  ondansetron Injectable 4 milliGRAM(s) IV Push every 8 hours PRN Nausea and/or Vomiting  sodium chloride 0.9% lock flush 10 milliLiter(s) IV Push every 1 hour PRN Pre/post blood products, medications, blood draw, and to maintain line patency         Vitals log        ICU Vital Signs Last 24 Hrs  T(C): 36.9 (24 Aug 2022 04:02), Max: 36.9 (23 Aug 2022 15:55)  T(F): 98.5 (24 Aug 2022 04:02), Max: 98.5 (24 Aug 2022 04:02)  HR: 63 (24 Aug 2022 07:00) (58 - 116)  BP: 98/59 (24 Aug 2022 07:00) (85/53 - 195/61)  BP(mean): 71 (24 Aug 2022 07:00) (61 - 128)  ABP: --  ABP(mean): --  RR: 16 (24 Aug 2022 07:00) (14 - 37)  SpO2: 99% (24 Aug 2022 07:00) (91% - 100%)    O2 Parameters below as of 24 Aug 2022 06:00  Patient On (Oxygen Delivery Method): ventilator             Mode: AC/ CMV (Assist Control/ Continuous Mandatory Ventilation)  RR (machine): 16  TV (machine): 400  FiO2: 40  PEEP: 5  ITime: 1  MAP: 17  PIP: 38      Input and Output:  I&O's Detail    23 Aug 2022 07:01  -  24 Aug 2022 07:00  --------------------------------------------------------  IN:    Enteral Tube Flush: 400 mL    Glucerna: 585 mL    IV PiggyBack: 100 mL  Total IN: 1085 mL    OUT:    Indwelling Catheter - Urethral (mL): 1350 mL  Total OUT: 1350 mL    Total NET: -265 mL          Lab Data                        8.9    10.48 )-----------( 391      ( 23 Aug 2022 06:00 )             29.9     08-23    135  |  101  |  30<H>  ----------------------------<  158<H>  5.4<H>   |  26  |  1.32<H>    Ca    8.8      23 Aug 2022 06:00              Review of Systems	      Objective     Physical Examination    heart s1s2  lung dec BS  abd soft  trach      Pertinent Lab findings & Imaging      Annalise:  NO   Adequate UO     I&O's Detail    23 Aug 2022 07:01  -  24 Aug 2022 07:00  --------------------------------------------------------  IN:    Enteral Tube Flush: 400 mL    Glucerna: 585 mL    IV PiggyBack: 100 mL  Total IN: 1085 mL    OUT:    Indwelling Catheter - Urethral (mL): 1350 mL  Total OUT: 1350 mL    Total NET: -265 mL               Discussed with:     Cultures:	        Radiology

## 2022-08-24 NOTE — PROGRESS NOTE ADULT - SUBJECTIVE AND OBJECTIVE BOX
Patient is a 76y old  Female who presents with a chief complaint of respiratory distress (24 Aug 2022 11:57)      INTERVAL HPI/OVERNIGHT EVENTS:    chart reviewed  no issues per nursing      MEDICATIONS  (STANDING):  albuterol/ipratropium for Nebulization 3 milliLiter(s) Nebulizer every 6 hours  chlorhexidine 0.12% Liquid 15 milliLiter(s) Oral Mucosa every 12 hours  chlorhexidine 2% Cloths 1 Application(s) Topical daily  collagenase Ointment 1 Application(s) Topical daily  dextrose 5%. 1000 milliLiter(s) (50 mL/Hr) IV Continuous <Continuous>  dextrose 5%. 1000 milliLiter(s) (100 mL/Hr) IV Continuous <Continuous>  dextrose 50% Injectable 25 Gram(s) IV Push once  dextrose 50% Injectable 12.5 Gram(s) IV Push once  dextrose 50% Injectable 25 Gram(s) IV Push once  folic acid 1 milliGRAM(s) Oral daily  furosemide    Tablet 20 milliGRAM(s) Oral daily  glucagon  Injectable 1 milliGRAM(s) IntraMuscular once  heparin   Injectable 5000 Unit(s) SubCutaneous every 12 hours  insulin glargine Injectable (LANTUS) 8 Unit(s) SubCutaneous every morning  insulin lispro (ADMELOG) corrective regimen sliding scale   SubCutaneous every 6 hours  lactobacillus acidophilus 1 Tablet(s) Oral daily  LORazepam     Tablet 2 milliGRAM(s) Oral every 4 hours  methocarbamol 500 milliGRAM(s) Oral two times a day  multivitamin 1 Tablet(s) Oral daily  nystatin Powder 1 Application(s) Topical three times a day  pantoprazole  Injectable 40 milliGRAM(s) IV Push daily  piperacillin/tazobactam IVPB.. 3.375 Gram(s) IV Intermittent every 8 hours  polyethylene glycol 3350 17 Gram(s) Oral every 12 hours  povidone iodine 10% Solution 1 Application(s) Topical daily  senna 3 Tablet(s) Oral at bedtime  simethicone 80 milliGRAM(s) Chew every 6 hours  sodium chloride 7% Inhalation 4 milliLiter(s) Inhalation every 12 hours  trimethoprim  160 mG/sulfamethoxazole 800 mG 2 Tablet(s) Oral two times a day    MEDICATIONS  (PRN):  acetaminophen     Tablet .. 650 milliGRAM(s) Oral every 6 hours PRN Temp greater or equal to 38C (100.4F), Mild Pain (1 - 3)  aluminum hydroxide/magnesium hydroxide/simethicone Suspension 30 milliLiter(s) Oral every 4 hours PRN Dyspepsia  dextrose Oral Gel 15 Gram(s) Oral once PRN Blood Glucose LESS THAN 70 milliGRAM(s)/deciliter  fentaNYL    Injectable 25 MICROGram(s) IV Push every 4 hours PRN Moderate Pain (4 - 6), agitation  melatonin 3 milliGRAM(s) Oral at bedtime PRN Insomnia  ondansetron Injectable 4 milliGRAM(s) IV Push every 8 hours PRN Nausea and/or Vomiting  sodium chloride 0.9% lock flush 10 milliLiter(s) IV Push every 1 hour PRN Pre/post blood products, medications, blood draw, and to maintain line patency      Allergies    codeine (Hives)    Intolerances        REVIEW OF SYSTEMS:  Pt with trach/vent / nonverbal, cannot provide ROS.     Vital Signs Last 24 Hrs  T(C): 36.6 (24 Aug 2022 08:30), Max: 36.9 (23 Aug 2022 15:55)  T(F): 97.8 (24 Aug 2022 08:30), Max: 98.5 (24 Aug 2022 04:02)  HR: 83 (24 Aug 2022 11:10) (58 - 116)  BP: 117/56 (24 Aug 2022 10:00) (85/53 - 195/61)  BP(mean): 75 (24 Aug 2022 10:00) (61 - 128)  RR: 22 (24 Aug 2022 10:00) (14 - 37)  SpO2: 100% (24 Aug 2022 11:10) (91% - 100%)    Parameters below as of 24 Aug 2022 08:00  Patient On (Oxygen Delivery Method): ventilator    O2 Concentration (%): 40    PHYSICAL EXAM:  GENERAL: chronically ill-appearing  HEENT:  anicteric, dry mucous membranes ; trach/vent  CHEST/LUNG:  decreased breath sounds b/l   HEART:  RRR, S1, S2  ABDOMEN:  BS+, soft, no apparent tenderness, distended; G-tube in place with some maceration at entry point  EXTREMITIES: no cyanosis or apparent calf tenderness  NERVOUS SYSTEM: nonverbal, does not follow commands     LABS:      Ca    8.8        23 Aug 2022 06:00          CAPILLARY BLOOD GLUCOSE      POCT Blood Glucose.: 153 mg/dL (24 Aug 2022 12:27)  POCT Blood Glucose.: 136 mg/dL (24 Aug 2022 06:00)  POCT Blood Glucose.: 92 mg/dL (24 Aug 2022 00:16)  POCT Blood Glucose.: 202 mg/dL (23 Aug 2022 17:58)  POCT Blood Glucose.: 189 mg/dL (23 Aug 2022 12:49)      RADIOLOGY & ADDITIONAL TESTS:

## 2022-08-24 NOTE — PROGRESS NOTE ADULT - SUBJECTIVE AND OBJECTIVE BOX
Chief Complaint: Respiratory distress    Interval Events: No events overnight.    Review of Systems:  Unable to obtain    Physical Exam:  Vital Signs Last 24 Hrs  T(C): 36.9 (24 Aug 2022 04:02), Max: 36.9 (23 Aug 2022 15:55)  T(F): 98.5 (24 Aug 2022 04:02), Max: 98.5 (24 Aug 2022 04:02)  HR: 62 (24 Aug 2022 08:00) (58 - 116)  BP: 104/60 (24 Aug 2022 08:00) (85/53 - 195/61)  BP(mean): 75 (24 Aug 2022 08:00) (61 - 128)  RR: 16 (24 Aug 2022 08:00) (14 - 37)  SpO2: 100% (24 Aug 2022 08:00) (91% - 100%)  Parameters below as of 24 Aug 2022 08:00  Patient On (Oxygen Delivery Method): ventilator  O2 Concentration (%): 40  General: Unresponsive  HEENT: Intubated  Neck: No JVD, no carotid bruit  Lungs: CTAB  CV: RRR, nl S1/S2, no M/R/G  Abdomen: S/NT/ND, +BS  Extremities: No LE edema, no cyanosis  Neuro: AAOx0  Skin: No rash    Labs:             08-23    135  |  101  |  30<H>  ----------------------------<  158<H>  5.4<H>   |  26  |  1.32<H>    Ca    8.8      23 Aug 2022 06:00                          8.9    10.48 )-----------( 391      ( 23 Aug 2022 06:00 )             29.9     Telemetry: Sinus rhythm

## 2022-08-24 NOTE — CONSULT NOTE ADULT - CONSULT REQUESTED DATE/TIME
19-Aug-2022 07:07
19-Aug-2022 08:21
18-Aug-2022
19-Aug-2022 17:56
24-Aug-2022 11:25
22-Aug-2022 17:26

## 2022-08-24 NOTE — PROGRESS NOTE ADULT - ASSESSMENT
The patient is a 76 year old female with a history of HTN, DM, CVA, dementia, chronic respiratory failure s/p trach, PEG, cardiac arrest, anemia, pleural effusions who presents with respiratory distress.    Plan:  - Echo 5/30/22 with normal LV systolic function, mod pulm HTN  - Repeat echo similar. IVC small/collapsible, especially in a patient receiving positive pressure ventilation suggestive of hypovolemia.  - CT chest with moderate bilateral pleural effusions and upper lobe PNA  - Pleural effusions previously were exudative, likely parapneumonic  - BNP mildly elevated at 3505  - Continue furosemide 20 mg PO daily  - IV antibiotics

## 2022-08-25 LAB
ALBUMIN SERPL ELPH-MCNC: 2.1 G/DL — LOW (ref 3.3–5)
ALP SERPL-CCNC: 110 U/L — SIGNIFICANT CHANGE UP (ref 30–120)
ALT FLD-CCNC: 17 U/L DA — SIGNIFICANT CHANGE UP (ref 10–60)
ANION GAP SERPL CALC-SCNC: 5 MMOL/L — SIGNIFICANT CHANGE UP (ref 5–17)
AST SERPL-CCNC: 16 U/L — SIGNIFICANT CHANGE UP (ref 10–40)
BILIRUB SERPL-MCNC: 0.2 MG/DL — SIGNIFICANT CHANGE UP (ref 0.2–1.2)
BUN SERPL-MCNC: 29 MG/DL — HIGH (ref 7–23)
CALCIUM SERPL-MCNC: 8.7 MG/DL — SIGNIFICANT CHANGE UP (ref 8.4–10.5)
CHLORIDE SERPL-SCNC: 101 MMOL/L — SIGNIFICANT CHANGE UP (ref 96–108)
CO2 SERPL-SCNC: 27 MMOL/L — SIGNIFICANT CHANGE UP (ref 22–31)
CREAT SERPL-MCNC: 1.53 MG/DL — HIGH (ref 0.5–1.3)
EGFR: 35 ML/MIN/1.73M2 — LOW
GLUCOSE SERPL-MCNC: 155 MG/DL — HIGH (ref 70–99)
HCT VFR BLD CALC: 28.7 % — LOW (ref 34.5–45)
HGB BLD-MCNC: 8.7 G/DL — LOW (ref 11.5–15.5)
MCHC RBC-ENTMCNC: 26.9 PG — LOW (ref 27–34)
MCHC RBC-ENTMCNC: 30.3 GM/DL — LOW (ref 32–36)
MCV RBC AUTO: 88.9 FL — SIGNIFICANT CHANGE UP (ref 80–100)
NRBC # BLD: 0 /100 WBCS — SIGNIFICANT CHANGE UP (ref 0–0)
PLATELET # BLD AUTO: 364 K/UL — SIGNIFICANT CHANGE UP (ref 150–400)
POTASSIUM SERPL-MCNC: 6 MMOL/L — HIGH (ref 3.5–5.3)
POTASSIUM SERPL-SCNC: 6 MMOL/L — HIGH (ref 3.5–5.3)
PROT SERPL-MCNC: 7.5 G/DL — SIGNIFICANT CHANGE UP (ref 6–8.3)
RBC # BLD: 3.23 M/UL — LOW (ref 3.8–5.2)
RBC # FLD: 16.7 % — HIGH (ref 10.3–14.5)
SODIUM SERPL-SCNC: 133 MMOL/L — LOW (ref 135–145)
WBC # BLD: 9.25 K/UL — SIGNIFICANT CHANGE UP (ref 3.8–10.5)
WBC # FLD AUTO: 9.25 K/UL — SIGNIFICANT CHANGE UP (ref 3.8–10.5)

## 2022-08-25 PROCEDURE — 99233 SBSQ HOSP IP/OBS HIGH 50: CPT

## 2022-08-25 RX ORDER — SODIUM ZIRCONIUM CYCLOSILICATE 10 G/10G
10 POWDER, FOR SUSPENSION ORAL
Refills: 0 | Status: COMPLETED | OUTPATIENT
Start: 2022-08-25 | End: 2022-08-25

## 2022-08-25 RX ORDER — SODIUM CHLORIDE 9 MG/ML
1000 INJECTION INTRAMUSCULAR; INTRAVENOUS; SUBCUTANEOUS
Refills: 0 | Status: DISCONTINUED | OUTPATIENT
Start: 2022-08-25 | End: 2022-08-26

## 2022-08-25 RX ADMIN — Medication 1 MILLIGRAM(S): at 11:54

## 2022-08-25 RX ADMIN — Medication 20 MILLIGRAM(S): at 05:41

## 2022-08-25 RX ADMIN — Medication 2: at 05:40

## 2022-08-25 RX ADMIN — Medication 2 MILLIGRAM(S): at 01:45

## 2022-08-25 RX ADMIN — SODIUM ZIRCONIUM CYCLOSILICATE 10 GRAM(S): 10 POWDER, FOR SUSPENSION ORAL at 09:27

## 2022-08-25 RX ADMIN — SIMETHICONE 80 MILLIGRAM(S): 80 TABLET, CHEWABLE ORAL at 11:54

## 2022-08-25 RX ADMIN — Medication 3 MILLILITER(S): at 00:38

## 2022-08-25 RX ADMIN — PIPERACILLIN AND TAZOBACTAM 25 GRAM(S): 4; .5 INJECTION, POWDER, LYOPHILIZED, FOR SOLUTION INTRAVENOUS at 09:27

## 2022-08-25 RX ADMIN — Medication 2 TABLET(S): at 17:12

## 2022-08-25 RX ADMIN — Medication 3 MILLILITER(S): at 13:40

## 2022-08-25 RX ADMIN — Medication 3 MILLILITER(S): at 06:31

## 2022-08-25 RX ADMIN — Medication 2: at 11:55

## 2022-08-25 RX ADMIN — CHLORHEXIDINE GLUCONATE 1 APPLICATION(S): 213 SOLUTION TOPICAL at 11:55

## 2022-08-25 RX ADMIN — Medication 2 MILLIGRAM(S): at 10:04

## 2022-08-25 RX ADMIN — NYSTATIN CREAM 1 APPLICATION(S): 100000 CREAM TOPICAL at 05:41

## 2022-08-25 RX ADMIN — NYSTATIN CREAM 1 APPLICATION(S): 100000 CREAM TOPICAL at 21:13

## 2022-08-25 RX ADMIN — Medication 2 MILLIGRAM(S): at 17:13

## 2022-08-25 RX ADMIN — SODIUM CHLORIDE 4 MILLILITER(S): 9 INJECTION INTRAMUSCULAR; INTRAVENOUS; SUBCUTANEOUS at 06:51

## 2022-08-25 RX ADMIN — HEPARIN SODIUM 5000 UNIT(S): 5000 INJECTION INTRAVENOUS; SUBCUTANEOUS at 17:12

## 2022-08-25 RX ADMIN — SODIUM CHLORIDE 4 MILLILITER(S): 9 INJECTION INTRAMUSCULAR; INTRAVENOUS; SUBCUTANEOUS at 20:47

## 2022-08-25 RX ADMIN — SODIUM CHLORIDE 75 MILLILITER(S): 9 INJECTION INTRAMUSCULAR; INTRAVENOUS; SUBCUTANEOUS at 09:28

## 2022-08-25 RX ADMIN — METHOCARBAMOL 500 MILLIGRAM(S): 500 TABLET, FILM COATED ORAL at 17:58

## 2022-08-25 RX ADMIN — SIMETHICONE 80 MILLIGRAM(S): 80 TABLET, CHEWABLE ORAL at 17:12

## 2022-08-25 RX ADMIN — Medication 1 APPLICATION(S): at 11:53

## 2022-08-25 RX ADMIN — FENTANYL CITRATE 25 MICROGRAM(S): 50 INJECTION INTRAVENOUS at 02:16

## 2022-08-25 RX ADMIN — FENTANYL CITRATE 25 MICROGRAM(S): 50 INJECTION INTRAVENOUS at 17:11

## 2022-08-25 RX ADMIN — Medication 3 MILLILITER(S): at 20:50

## 2022-08-25 RX ADMIN — FENTANYL CITRATE 25 MICROGRAM(S): 50 INJECTION INTRAVENOUS at 22:13

## 2022-08-25 RX ADMIN — Medication 2 MILLIGRAM(S): at 05:41

## 2022-08-25 RX ADMIN — Medication 2 TABLET(S): at 05:41

## 2022-08-25 RX ADMIN — NYSTATIN CREAM 1 APPLICATION(S): 100000 CREAM TOPICAL at 13:56

## 2022-08-25 RX ADMIN — Medication 2 MILLIGRAM(S): at 21:12

## 2022-08-25 RX ADMIN — SIMETHICONE 80 MILLIGRAM(S): 80 TABLET, CHEWABLE ORAL at 23:52

## 2022-08-25 RX ADMIN — CHLORHEXIDINE GLUCONATE 15 MILLILITER(S): 213 SOLUTION TOPICAL at 17:12

## 2022-08-25 RX ADMIN — INSULIN GLARGINE 8 UNIT(S): 100 INJECTION, SOLUTION SUBCUTANEOUS at 08:35

## 2022-08-25 RX ADMIN — Medication 1 TABLET(S): at 11:54

## 2022-08-25 RX ADMIN — HEPARIN SODIUM 5000 UNIT(S): 5000 INJECTION INTRAVENOUS; SUBCUTANEOUS at 05:40

## 2022-08-25 RX ADMIN — Medication 2 MILLIGRAM(S): at 14:01

## 2022-08-25 RX ADMIN — SODIUM ZIRCONIUM CYCLOSILICATE 10 GRAM(S): 10 POWDER, FOR SUSPENSION ORAL at 17:58

## 2022-08-25 RX ADMIN — Medication 1 APPLICATION(S): at 11:55

## 2022-08-25 RX ADMIN — PIPERACILLIN AND TAZOBACTAM 25 GRAM(S): 4; .5 INJECTION, POWDER, LYOPHILIZED, FOR SOLUTION INTRAVENOUS at 23:52

## 2022-08-25 RX ADMIN — SIMETHICONE 80 MILLIGRAM(S): 80 TABLET, CHEWABLE ORAL at 05:41

## 2022-08-25 RX ADMIN — FENTANYL CITRATE 25 MICROGRAM(S): 50 INJECTION INTRAVENOUS at 21:30

## 2022-08-25 RX ADMIN — METHOCARBAMOL 500 MILLIGRAM(S): 500 TABLET, FILM COATED ORAL at 05:41

## 2022-08-25 RX ADMIN — FENTANYL CITRATE 25 MICROGRAM(S): 50 INJECTION INTRAVENOUS at 01:46

## 2022-08-25 RX ADMIN — PIPERACILLIN AND TAZOBACTAM 25 GRAM(S): 4; .5 INJECTION, POWDER, LYOPHILIZED, FOR SOLUTION INTRAVENOUS at 17:17

## 2022-08-25 RX ADMIN — CHLORHEXIDINE GLUCONATE 15 MILLILITER(S): 213 SOLUTION TOPICAL at 05:41

## 2022-08-25 RX ADMIN — PANTOPRAZOLE SODIUM 40 MILLIGRAM(S): 20 TABLET, DELAYED RELEASE ORAL at 11:54

## 2022-08-25 NOTE — PROGRESS NOTE ADULT - ASSESSMENT
REVIEW OF SYMPTOMS      Able to give (reliable) ROS  NO     PHYSICAL EXAM    HEENT Unremarkable  atraumatic   RESP Fair air entry EXP prolonged    Harsh breath sound Resp distres mild   CARDIAC S1 S2 No S3     NO JVD    ABDOMEN SOFT BS PRESENT NOT DISTENDED No hepatosplenomegaly   PEDAL EDEMA present No calf tenderness  NO rash       AGE/SEX.   76 f  DOA.  8/18/2022  CC .  8/18/2022 respiratory failure, chronic trach vent, full code, recent Pneumonia/pleural effusion  shortness of breath    PROCEDURE  8/19/2022 [facility]     HOSPITAL COURSE.   WOUND CARE 8/18/2022   PNEUMONIA 8/18/2022 8/19 Tmax 101  LACTICEMIA 8/18/2022  VENT DEPENDENT  RESP FAILURE 8/18/2022    ANEMIA 8/18-8/19/2022 Hb 9.8 - 7    HYPERKALEMIA 8/18/2022   RYAN 8/18/2022   DM     PMH .  pmh Pneumonia    pmh 5/2/2022 SERRATIA CARBAPENEM RESISTANT PSEUDOMONAS   pmh HTN,   pmh DM,   pmh CVA,   pmh  chronic respiratory failure   pmh s/p trach, PEG,   pmh cardiac arrest  pmh Pl effsn  r PL EFFSN   6/8/2022 r thorac g 161 l 222 p 4.9 p 10 l 16 .38    l pl effsn  5/25/2022 g 183 l 144/381 .37 p 3.4/5.3 .64 p 23 l 36   5/25/2022l pl fluid  lymph pred exudate   cyto (-)         GENERAL DATA .   GOC.  8/18/2022 full code      ALLGY.   codeine                          WT.     8/18/2022 63                               BMI.     8/18/2022 23                         ICU STAY. 8/18/2022  COVID. 8/18/2022 scv2 (-)       BEST PRACTICE ISSUES.    HOB ELEVATN. Yes  DVT PPLX.  8/18/2022 hpsc     SQUIRES PPLX.  8/18/2022 protonix 40     INFN PPLX. 8/18/2022 chlorhexidine .12%     8/19/2022 chlorhexidine 4%   SP SW EM.        DIET. 8/18/2022 gflucerna 1200 gt                   VS/ PO/IO/ VENT/ DRIPS.   8/25/2022 80 110/50   8/25/2022 ac 16 400 5 0.4     ASSESSMENT/RECOMMENDATIONS .   RESP.  VENT MANAGEMENT.  HOB elevation  Target Pplat 30 (-)  Target PO 90-95%  Target pH 730 (+)  Daily spontaneous breathing trials   Daily sedation vacation     GAS EXCHANGE.  vbg 8/18/2022 vbg pH 737   8/18/2022 8p   vent 100% 16/400 748/34/168     SEDATION.  8/20/2022 fentanyl 25.6 p    COPD.   8/18/2022 duoneb   8/19 nacl 7% bid     INFECTION.  WOUND CARE  8/18/2022 povidone l and r great toe   8/19/2022 collagenase buttocks     PNEUMONIA .  w 8/18-8/19-8/20-8/22-8/25/2022 w 25 - 11 - 14 - 7.5 - 9.2   ua 8/18/2022 w 3-5   pr 8/20-8/21-8/22/2022 pr 3.3 - 1.7 - 1.2   ct ch 8/18   persistent mod bl effs   underlying dependent airspace consolidation   septal thickening and patchy ground glass opacities maddie r lung   ctap nc 8/20/2022 (-)   rvp 8/18/2022 (-)  bc 8/18/2022 (-)   mrsa 8/19/2022 (-)   8/19/2022  zosyn Se Vignesh  8/19/2022 bactrim ds 2 bid   a/r.    PAST MICROBIO.   5/2/2022 SERRATIA CARBAPENEM RESISTANT PSEUDOMONAS     PLEURAL EFFSN.  ct  8/18   persistent mod bl effs     CARDIAC.  LACTICEMIA.  la 8/18-8/19/2022 la 3.7- 1.2     CHF.  bnp 8/19-8/22/2022 bnp [id number]  -  3505   echo 8/19/2022 n lvsf dd1 pasp 58   8/19/2022 lasix 40 one dose   8/24/2022 lasix 20     GI.  HEMAT.  ANEMIA.   Hb 8/18-8/19-8/20-8/21-7/22-8/23-8/25/2022   Hb 9.8 - 7-8.9   - 7.9 - 7.5 - 8.9 - 8.7   8/19/2022 7:21 PM recheck Hb   target Hb 7 (+)       TRANSFUSION   8/19 1 u prbc    RENAL.  RYAN.  Cr 8/18-8/19- 8/20-8/21-8/22-8/25/2022   Cr 1.2- 1.1 - 1.3 - 1.3 - 1.3 - 1.5   monitor    HYPONATREMIA.  Na 8/25/2022 na 133     IV fl.  8/25/2022 ns 75      ENDOCRINE.   DM.  8/18/2022 Insulin     NEURO.  8/19/2022 lorazepam 2.6   8/19/2022 methocarbamol 500.2       TIME SPENT   Over 39 minutes aggregate critical  care time spent on encounter; activities included   direct patient care, counseling and/or coordinating care reviewing notes, lab data/ imaging , discussion with multidisciplinary team/ patient  /family and explaining in detail risks, benefits, alternatives  of the recommendations     CHAPINCITO GUIDRY 76 f [facility] S 8/18/2022   DR JOSEPH DU

## 2022-08-25 NOTE — PROGRESS NOTE ADULT - SUBJECTIVE AND OBJECTIVE BOX
BREA BECKHAM     [facility] 04    Allergies    codeine (Hives)    Intolerances        PAST MEDICAL & SURGICAL HISTORY:  Dementia of frontal lobe type      Aphasic stroke      Diabetes mellitus      Respiratory failure      Hypertension      GERD (gastroesophageal reflux disease)      Constipation      Respiratory failure      CVA (cerebral vascular accident)      HTN (hypertension)      DM (diabetes mellitus)      Advanced dementia      COVID-19 virus detected      Quadriplegia      Pneumonia      Type II diabetes mellitus      Hx of appendectomy      Gastrostomy in place      Tracheostomy in place      Tracheostomy tube present      Feeding by G-tube          FAMILY HISTORY:  No pertinent family history in first degree relatives        Home Medications:  albuterol 90 mcg/inh inhalation aerosol with adapter: 2  inhaled every 6 hours (21 May 2022 21:16)  Bacid (LAC) oral tablet: 2 tab(s) by gastrostomy tube once a day (21 May 2022 21:16)  Betadine 10% topical swab: cleanse right and left great toes (21 May 2022 21:16)  Carafate 1 g/10 mL oral suspension: 10 milliliter(s) by gastrostomy tube 4 times a day (before meals and at bedtime) for 14 days (Started 6/4/21) (21 May 2022 21:16)  chlorhexidine 0.12% mucous membrane liquid: 15 milliliter(s) mucous membrane 2 times a day (21 May 2022 21:16)  ertapenem 1 g injection: 1 gram(s) injectable once a day until 6/11 (10 Zay 2022 12:12)  Eucerin topical cream: Apply topically to affected area once a day bilateral feet (21 May 2022 21:16)  folic acid 1 mg oral tablet: 1 tab(s) orally once a day (21 May 2022 21:16)  furosemide 20 mg oral tablet: 1 tab(s) orally once a day (10 Zay 2022 12:12)  insulin glargine 100 units/mL subcutaneous solution: 8 unit(s) subcutaneous once a day (in the morning) (01 Jun 2022 08:24)  ipratropium-albuterol 0.5 mg-2.5 mg/3 mL inhalation solution: 3 milliliter(s) inhaled 4 times a day (21 May 2022 21:16)  LORazepam 1 mg oral tablet: 1 tab(s) by gastrostomy tube every 4 hours (21 May 2022 21:16)  methocarbamol 500 mg oral tablet: 1 tab(s) by gastrostomy tube 2 times a day (21 May 2022 21:16)  MiraLax oral powder for reconstitution:  (21 May 2022 23:14)  Multiple Vitamins oral tablet: 1 tab(s) orally once a day (21 May 2022 21:16)  nystatin 100,000 units/g topical powder: 1 application topically 3 times a day (29 May 2022 16:35)  omeprazole 20 mg oral delayed release capsule: orally 2 times a day (21 May 2022 23:14)  polyethylene glycol 3350 oral powder for reconstitution: 17 gram(s) by gastrostomy tube every 12 hours (21 May 2022 21:16)  senna 8.6 mg oral tablet: 3 tab(s) by gastrostomy tube once a day (at bedtime) (21 May 2022 21:16)  simethicone 80 mg oral tablet, chewable: 1 tab(s) by gastrostomy tube every 6 hours (21 May 2022 21:16)  Tylenol 325 mg oral tablet: 2 tab(s) by gastrostomy tube once a day; 60 minutes prior to dressing change  (21 May 2022 21:16)  Tylenol 325 mg oral tablet: 2 tab(s) by gastrostomy tube every 6 hours, As Needed (21 May 2022 21:16)      MEDICATIONS  (STANDING):  albuterol/ipratropium for Nebulization 3 milliLiter(s) Nebulizer every 6 hours  chlorhexidine 0.12% Liquid 15 milliLiter(s) Oral Mucosa every 12 hours  chlorhexidine 2% Cloths 1 Application(s) Topical daily  collagenase Ointment 1 Application(s) Topical daily  dextrose 5%. 1000 milliLiter(s) (50 mL/Hr) IV Continuous <Continuous>  dextrose 5%. 1000 milliLiter(s) (100 mL/Hr) IV Continuous <Continuous>  dextrose 50% Injectable 25 Gram(s) IV Push once  dextrose 50% Injectable 25 Gram(s) IV Push once  dextrose 50% Injectable 12.5 Gram(s) IV Push once  folic acid 1 milliGRAM(s) Oral daily  furosemide    Tablet 20 milliGRAM(s) Oral daily  glucagon  Injectable 1 milliGRAM(s) IntraMuscular once  heparin   Injectable 5000 Unit(s) SubCutaneous every 12 hours  insulin glargine Injectable (LANTUS) 8 Unit(s) SubCutaneous every morning  insulin lispro (ADMELOG) corrective regimen sliding scale   SubCutaneous every 6 hours  lactobacillus acidophilus 1 Tablet(s) Oral daily  LORazepam     Tablet 2 milliGRAM(s) Oral every 4 hours  methocarbamol 500 milliGRAM(s) Oral two times a day  multivitamin 1 Tablet(s) Oral daily  nystatin Powder 1 Application(s) Topical three times a day  pantoprazole  Injectable 40 milliGRAM(s) IV Push daily  piperacillin/tazobactam IVPB.. 3.375 Gram(s) IV Intermittent every 8 hours  polyethylene glycol 3350 17 Gram(s) Oral every 12 hours  povidone iodine 10% Solution 1 Application(s) Topical daily  senna 3 Tablet(s) Oral at bedtime  simethicone 80 milliGRAM(s) Chew every 6 hours  sodium chloride 0.9%. 1000 milliLiter(s) (75 mL/Hr) IV Continuous <Continuous>  sodium chloride 7% Inhalation 4 milliLiter(s) Inhalation every 12 hours  sodium zirconium cyclosilicate 10 Gram(s) Oral two times a day  trimethoprim  160 mG/sulfamethoxazole 800 mG 2 Tablet(s) Oral two times a day    MEDICATIONS  (PRN):  acetaminophen     Tablet .. 650 milliGRAM(s) Oral every 6 hours PRN Temp greater or equal to 38C (100.4F), Mild Pain (1 - 3)  aluminum hydroxide/magnesium hydroxide/simethicone Suspension 30 milliLiter(s) Oral every 4 hours PRN Dyspepsia  dextrose Oral Gel 15 Gram(s) Oral once PRN Blood Glucose LESS THAN 70 milliGRAM(s)/deciliter  fentaNYL    Injectable 25 MICROGram(s) IV Push every 4 hours PRN Moderate Pain (4 - 6), agitation  melatonin 3 milliGRAM(s) Oral at bedtime PRN Insomnia  ondansetron Injectable 4 milliGRAM(s) IV Push every 8 hours PRN Nausea and/or Vomiting  sodium chloride 0.9% lock flush 10 milliLiter(s) IV Push every 1 hour PRN Pre/post blood products, medications, blood draw, and to maintain line patency      Diet, NPO with Tube Feed:   Tube Feeding Modality: Gastrostomy  Glucerna 1.5 Horacio  Total Volume for 24 Hours (mL): 900  Continuous  Starting Tube Feed Rate mL per Hour: 45  Until Goal Tube Feed Rate (mL per Hour): 45  Tube Feed Duration (in Hours): 20  Tube Feed Start Time: 00:00  Free Water Flush  Bolus   Total Volume per Flush (mL): 200   Frequency: Every 6 Hours   Total Daily Volume of Flush (mL): 800    Start Time: 00:00 (08-19-22 @ 13:25) [Active]          Vital Signs Last 24 Hrs  T(C): 36.8 (25 Aug 2022 04:00), Max: 37 (25 Aug 2022 00:16)  T(F): 98.2 (25 Aug 2022 04:00), Max: 98.6 (25 Aug 2022 00:16)  HR: 68 (25 Aug 2022 08:00) (55 - 98)  BP: 105/75 (25 Aug 2022 08:00) (86/54 - 128/65)  BP(mean): 86 (25 Aug 2022 08:00) (61 - 90)  RR: 23 (25 Aug 2022 08:00) (16 - 33)  SpO2: 100% (25 Aug 2022 08:00) (74% - 100%)    Parameters below as of 25 Aug 2022 08:00  Patient On (Oxygen Delivery Method): ventilator    O2 Concentration (%): 40      08-24-22 @ 07:01  -  08-25-22 @ 07:00  --------------------------------------------------------  IN: 1580 mL / OUT: 2900 mL / NET: -1320 mL        Mode: AC/ CMV (Assist Control/ Continuous Mandatory Ventilation), RR (machine): 16, TV (machine): 400, FiO2: 40, PEEP: 5, ITime: 1, MAP: 18, PIP: 30      LABS:                        8.7    9.25  )-----------( 364      ( 25 Aug 2022 06:00 )             28.7     08-25    133<L>  |  101  |  29<H>  ----------------------------<  155<H>  6.0<H>   |  27  |  1.53<H>    Ca    8.7      25 Aug 2022 06:00    TPro  7.5  /  Alb  2.1<L>  /  TBili  0.2  /  DBili  x   /  AST  16  /  ALT  17  /  AlkPhos  110  08-25              WBC:  WBC Count: 9.25 K/uL (08-25 @ 06:00)  WBC Count: 7.31 K/uL (08-24 @ 19:41)  WBC Count: 10.48 K/uL (08-23 @ 06:00)  WBC Count: 7.53 K/uL (08-22 @ 06:35)      MICROBIOLOGY:  RECENT CULTURES:  08-19 ET Tube ET Tube Pseudomonas aeruginosa (Carbapenem Resistant)  Stenotrophomonas maltophilia   Few polymorphonuclear leukocytes per low power field  Rare Squamous epithelial cells per low power field  Few Gram Negative Rods per oil power field   Few Pseudomonas aeruginosa (Carbapenem Resistant)  Moderate Stenotrophomonas maltophilia  Normal Respiratory Elvira present    08-18 Clean Catch Clean Catch (Midstream) XXXX XXXX   10,000 - 49,000 CFU/mL Candida albicans "Susceptibilities not performed"    08-18 .Blood Blood-Peripheral XXXX XXXX   No Growth Final    08-18 .Blood Blood-Peripheral XXXX XXXX   No Growth Final                    Sodium:  Sodium, Serum: 133 mmol/L (08-25 @ 06:00)  Sodium, Serum: 132 mmol/L (08-24 @ 19:41)  Sodium, Serum: 135 mmol/L (08-23 @ 06:00)  Sodium, Serum: 135 mmol/L (08-22 @ 06:35)      1.53 mg/dL 08-25 @ 06:00  1.41 mg/dL 08-24 @ 19:41  1.32 mg/dL 08-23 @ 06:00  1.33 mg/dL 08-22 @ 06:35      Hemoglobin:  Hemoglobin: 8.7 g/dL (08-25 @ 06:00)  Hemoglobin: 7.9 g/dL (08-24 @ 19:41)  Hemoglobin: 8.9 g/dL (08-23 @ 06:00)  Hemoglobin: 7.5 g/dL (08-22 @ 06:35)      Platelets: Platelet Count - Automated: 364 K/uL (08-25 @ 06:00)  Platelet Count - Automated: 321 K/uL (08-24 @ 19:41)  Platelet Count - Automated: 391 K/uL (08-23 @ 06:00)  Platelet Count - Automated: 285 K/uL (08-22 @ 06:35)      LIVER FUNCTIONS - ( 25 Aug 2022 06:00 )  Alb: 2.1 g/dL / Pro: 7.5 g/dL / ALK PHOS: 110 U/L / ALT: 17 U/L DA / AST: 16 U/L / GGT: x                 RADIOLOGY & ADDITIONAL STUDIES:      MICROBIOLOGY:  RECENT CULTURES:  08-19 ET Tube ET Tube Pseudomonas aeruginosa (Carbapenem Resistant)  Stenotrophomonas maltophilia   Few polymorphonuclear leukocytes per low power field  Rare Squamous epithelial cells per low power field  Few Gram Negative Rods per oil power field   Few Pseudomonas aeruginosa (Carbapenem Resistant)  Moderate Stenotrophomonas maltophilia  Normal Respiratory Elvira present    08-18 Clean Catch Clean Catch (Midstream) XXXX XXXX   10,000 - 49,000 CFU/mL Candida albicans "Susceptibilities not performed"    08-18 .Blood Blood-Peripheral XXXX XXXX   No Growth Final    08-18 .Blood Blood-Peripheral XXXX XXXX   No Growth Final

## 2022-08-25 NOTE — PROGRESS NOTE ADULT - ASSESSMENT
77yo F with PMH of dementia, COPD with chronic resp failure and is vent dependent, s/p PEG, hx of cardiac arrest, diastolic CHF, cor pulmonale, hx of CVA, COVID-19 infxn, CKD, anemia, multiple admissions for respiratory distress (recent admission from 6/1-6/9 diagnosed with PNA with parapneumonic pleural effusion s/p thoracentesis and Avycaz) who presents from [facility] for respiratory distress again a/w sepsis and respiratory failure due to suspected recurrent gram-negative PNA.    Severe sepsis and acute hypoxic respiratory failure 2/2 gram-negative PNA   - continue current vent settings (on AC with RR 16, , PEEP 5, FiO2 100%) maintain O2 sat >92%  - was on ertapenem, as per ID changed to zosyn+bactrim for coverage of prior admissions cultures of MDR Serratia and CRE Pseudomonas  - lactate downtrending, procalcitonin high but improving with treatment (4.76 -> 3.34 -> 1.77)  - cautious use of fluids given hx of CHF (received 1750cc of NS in ED)  - f/u blood cultures (NGTD), urine culture pending   - f/u trach sputum culture - has GNR growing awaiting speciation / sensitivities  - has hx of respiratory distress driven by vent asynchrony and would require IV benzos ; trialed IV fentanyl with adequate effect this evening  - Checked CT Abd/P to eval for any other potential source sign of infection and found no significant sign of intra-abdominal infection on CT  - cc/pulm consult (Jaime), recs appreciated  - ID ([facility]), recs appreciated  - palliative care (Emre), recs appreciated - pt is full code    Diastolic CHF  B/L pleural effusions  - likely is a component of pulmonary edema given hx of CHF, given lasix 40mg IV x1 post-transfusion  - last TTE was 5/30 with EF 65% with normal LVSF, dilated RV, and moderate pHTN -> repeat TTE appears unchanged  - cardiology consult (Deepak), recs appreciated -rec to hold diuretic for today  - may need repeat thoracentesis (had 1.5L drained two admissions ago), pulmonology consulted; was parapneumonic on that admission as opposed to transudative and thus less likely related to CHF    PEG tube leak  - Nursing reported small amount of tube feed leak around the PEG tube entry site.   - Consulted GI (Coral), recs appreciated  - pt did have tube replaced on a recent admission    Anemia of chronic disease  - received 1 unit of pRBC on 8/19 for Hgb of 7.0 with appropriate response in Hgb -- now Hgb with downtrend to 7.5  - has been evaluated by GI and hematology in the past -> dx with ACD with intermittent GI bleeding  - negative occult blood   - monitor H&H, if continues to trend down - will give another transfusion  - pt may need Retacrit -- Cr is 1.3 with eGFR of 41, but given pt has low muscle mass from being bedbound for years, this GFR estimate is likely falsely high  - consider nephro eval to assess if appropriate to give Retacrit    Hyperkalemia  - given both insulin and lasix   still remains elevated  nutrition consulted    Hx of CKD stage 3 - near baseline of 1.2-1.3  - renally dose medications and monitor BMP and electrolytes   - Cr is 1.3 with eGFR of 41, but given pt has low muscle mass from being bedbound for years, this GFR estimate is likely falsely high  - consider nephro eval   nutrition consulted    HTN  - not on any BP meds at facility  - monitor VS    DM2  - NPO with TF  - continue with insulin 8units qAM as per last admission  - c/w moderate insulin coverage scale  - goal -180    Diet  - continue with same TF from last admission    Preventive measures  - cont with heparin subq for DVT ppx  - Full Code

## 2022-08-25 NOTE — PROGRESS NOTE ADULT - SUBJECTIVE AND OBJECTIVE BOX
Chief Complaint: Respiratory distress    Interval Events: No events overnight.    Review of Systems:  Unable to obtain    Physical Exam:  Vital Signs Last 24 Hrs  T(C): 36.8 (25 Aug 2022 04:00), Max: 37 (25 Aug 2022 00:16)  T(F): 98.2 (25 Aug 2022 04:00), Max: 98.6 (25 Aug 2022 00:16)  HR: 68 (25 Aug 2022 07:08) (55 - 98)  BP: 108/61 (25 Aug 2022 07:00) (86/54 - 128/65)  BP(mean): 77 (25 Aug 2022 07:00) (61 - 90)  RR: 21 (25 Aug 2022 07:00) (16 - 33)  SpO2: 100% (25 Aug 2022 07:08) (74% - 100%)  Parameters below as of 25 Aug 2022 07:08  Patient On (Oxygen Delivery Method): ventilator,.50  General: Unresponsive  HEENT: Intubated  Neck: No JVD, no carotid bruit  Lungs: CTAB  CV: RRR, nl S1/S2, no M/R/G  Abdomen: S/NT/ND, +BS  Extremities: No LE edema, no cyanosis  Neuro: AAOx0  Skin: No rash    Labs:             08-25    133<L>  |  101  |  29<H>  ----------------------------<  155<H>  6.0<H>   |  27  |  1.53<H>    Ca    8.7      25 Aug 2022 06:00    TPro  7.5  /  Alb  2.1<L>  /  TBili  0.2  /  DBili  x   /  AST  16  /  ALT  17  /  AlkPhos  110  08-25                        8.7    9.25  )-----------( 364      ( 25 Aug 2022 06:00 )             28.7       Telemetry: Sinus rhythm

## 2022-08-25 NOTE — PROGRESS NOTE ADULT - SUBJECTIVE AND OBJECTIVE BOX
Date/Time Patient Seen:  		  Referring MD:   Data Reviewed	       Patient is a 76y old  Female who presents with a chief complaint of respiratory distress (25 Aug 2022 09:32)      Subjective/HPI     PAST MEDICAL & SURGICAL HISTORY:  Dementia of frontal lobe type    Aphasic stroke    Diabetes mellitus    Respiratory failure    Hypertension    GERD (gastroesophageal reflux disease)    Constipation    Respiratory failure    CVA (cerebral vascular accident)    HTN (hypertension)    DM (diabetes mellitus)    Advanced dementia    COVID-19 virus detected    Quadriplegia    Pneumonia    Type II diabetes mellitus    Hx of appendectomy    Gastrostomy in place    Tracheostomy in place    Tracheostomy tube present    Feeding by G-tube          Medication list         MEDICATIONS  (STANDING):  albuterol/ipratropium for Nebulization 3 milliLiter(s) Nebulizer every 6 hours  chlorhexidine 0.12% Liquid 15 milliLiter(s) Oral Mucosa every 12 hours  chlorhexidine 2% Cloths 1 Application(s) Topical daily  collagenase Ointment 1 Application(s) Topical daily  dextrose 5%. 1000 milliLiter(s) (50 mL/Hr) IV Continuous <Continuous>  dextrose 5%. 1000 milliLiter(s) (100 mL/Hr) IV Continuous <Continuous>  dextrose 50% Injectable 25 Gram(s) IV Push once  dextrose 50% Injectable 25 Gram(s) IV Push once  dextrose 50% Injectable 12.5 Gram(s) IV Push once  folic acid 1 milliGRAM(s) Oral daily  furosemide    Tablet 20 milliGRAM(s) Oral daily  glucagon  Injectable 1 milliGRAM(s) IntraMuscular once  heparin   Injectable 5000 Unit(s) SubCutaneous every 12 hours  insulin glargine Injectable (LANTUS) 8 Unit(s) SubCutaneous every morning  insulin lispro (ADMELOG) corrective regimen sliding scale   SubCutaneous every 6 hours  lactobacillus acidophilus 1 Tablet(s) Oral daily  LORazepam     Tablet 2 milliGRAM(s) Oral every 4 hours  methocarbamol 500 milliGRAM(s) Oral two times a day  multivitamin 1 Tablet(s) Oral daily  nystatin Powder 1 Application(s) Topical three times a day  pantoprazole  Injectable 40 milliGRAM(s) IV Push daily  piperacillin/tazobactam IVPB.. 3.375 Gram(s) IV Intermittent every 8 hours  polyethylene glycol 3350 17 Gram(s) Oral every 12 hours  povidone iodine 10% Solution 1 Application(s) Topical daily  senna 3 Tablet(s) Oral at bedtime  simethicone 80 milliGRAM(s) Chew every 6 hours  sodium chloride 0.9%. 1000 milliLiter(s) (75 mL/Hr) IV Continuous <Continuous>  sodium chloride 7% Inhalation 4 milliLiter(s) Inhalation every 12 hours  sodium zirconium cyclosilicate 10 Gram(s) Oral two times a day  trimethoprim  160 mG/sulfamethoxazole 800 mG 2 Tablet(s) Oral two times a day    MEDICATIONS  (PRN):  acetaminophen     Tablet .. 650 milliGRAM(s) Oral every 6 hours PRN Temp greater or equal to 38C (100.4F), Mild Pain (1 - 3)  aluminum hydroxide/magnesium hydroxide/simethicone Suspension 30 milliLiter(s) Oral every 4 hours PRN Dyspepsia  dextrose Oral Gel 15 Gram(s) Oral once PRN Blood Glucose LESS THAN 70 milliGRAM(s)/deciliter  fentaNYL    Injectable 25 MICROGram(s) IV Push every 4 hours PRN Moderate Pain (4 - 6), agitation  melatonin 3 milliGRAM(s) Oral at bedtime PRN Insomnia  ondansetron Injectable 4 milliGRAM(s) IV Push every 8 hours PRN Nausea and/or Vomiting  sodium chloride 0.9% lock flush 10 milliLiter(s) IV Push every 1 hour PRN Pre/post blood products, medications, blood draw, and to maintain line patency         Vitals log        ICU Vital Signs Last 24 Hrs  T(C): 36.8 (25 Aug 2022 04:00), Max: 37 (25 Aug 2022 00:16)  T(F): 98.2 (25 Aug 2022 04:00), Max: 98.6 (25 Aug 2022 00:16)  HR: 68 (25 Aug 2022 10:00) (55 - 98)  BP: 111/61 (25 Aug 2022 10:00) (86/54 - 128/65)  BP(mean): 77 (25 Aug 2022 10:00) (61 - 90)  ABP: --  ABP(mean): --  RR: 21 (25 Aug 2022 10:00) (16 - 33)  SpO2: 100% (25 Aug 2022 10:00) (74% - 100%)    O2 Parameters below as of 25 Aug 2022 10:00  Patient On (Oxygen Delivery Method): ventilator             Mode: AC/ CMV (Assist Control/ Continuous Mandatory Ventilation)  RR (machine): 16  TV (machine): 400  FiO2: 40  PEEP: 5  ITime: 1  MAP: 18  PIP: 30      Input and Output:  I&O's Detail    24 Aug 2022 07:01  -  25 Aug 2022 07:00  --------------------------------------------------------  IN:    Free Water: 400 mL    Glucerna: 1080 mL    IV PiggyBack: 100 mL  Total IN: 1580 mL    OUT:    Indwelling Catheter - Urethral (mL): 2000 mL    Voided (mL): 900 mL  Total OUT: 2900 mL    Total NET: -1320 mL          Lab Data                        8.7    9.25  )-----------( 364      ( 25 Aug 2022 06:00 )             28.7     08-25    133<L>  |  101  |  29<H>  ----------------------------<  155<H>  6.0<H>   |  27  |  1.53<H>    Ca    8.7      25 Aug 2022 06:00    TPro  7.5  /  Alb  2.1<L>  /  TBili  0.2  /  DBili  x   /  AST  16  /  ALT  17  /  AlkPhos  110  08-25            Review of Systems	      Objective     Physical Examination    heart s1s2  lung dec BS  abd soft      Pertinent Lab findings & Imaging      Annalise:  NO   Adequate UO     I&O's Detail    24 Aug 2022 07:01  -  25 Aug 2022 07:00  --------------------------------------------------------  IN:    Free Water: 400 mL    Glucerna: 1080 mL    IV PiggyBack: 100 mL  Total IN: 1580 mL    OUT:    Indwelling Catheter - Urethral (mL): 2000 mL    Voided (mL): 900 mL  Total OUT: 2900 mL    Total NET: -1320 mL               Discussed with:     Cultures:	        Radiology

## 2022-08-25 NOTE — PROGRESS NOTE ADULT - SUBJECTIVE AND OBJECTIVE BOX
Patient is a 76y old  Female who presents with a chief complaint of respiratory distress (25 Aug 2022 13:16)    BRIEF HOSPITAL COURSE: Patient is a 75 yo female with a pmh of dementia, COPD with chronic resp failure;  vent dependent, s/p PEG, cardiac arrest, CHF, cor pulmonale, CVA, COVID-19, CKD, anemia, multiple admissions for respiratory distress (last admission from 6/1-6/9 diagnosed with PNA with parapneumonic pleural effusion s/p thoracentesis and Avycaz) who presented to  ED on 08/18/22 from [facility] with respiratory distress. In Ed patient was noted to have dyspnea and hypoxia. Work up revealed WBC 25.06, platelet 466, K 5.7, lactate 3.7, ABG 7.48/34/168. Patient was given empiric abx with vanco and zosyn, along with 1750 cc of IVF. Of note, patient admitted with outpatient midline. Patient admitted to [facility].      Events last 24 hours:   - Patient w/hyperkalemia to 6.0, ordered STAT BMP to reassess     Review of Systems:  Unable to assess given clinical condition     PAST MEDICAL & SURGICAL HISTORY:  Dementia of frontal lobe type  Aphasic stroke  Diabetes mellitus  Respiratory failure  Hypertension  GERD (gastroesophageal reflux disease)  Constipation  Respiratory failure  CVA (cerebral vascular accident)  HTN (hypertension)  DM (diabetes mellitus)  Advanced dementia  COVID-19 virus detected  Quadriplegia  Pneumonia  Type II diabetes mellitus  Hx of appendectomy  Gastrostomy in place  Tracheostomy in place  Tracheostomy tube present  Feeding by G-tube    Medications:  piperacillin/tazobactam IVPB.. 3.375 Gram(s) IV Intermittent every 8 hours  trimethoprim  160 mG/sulfamethoxazole 800 mG 2 Tablet(s) Oral two times a day  furosemide    Tablet 20 milliGRAM(s) Oral daily  albuterol/ipratropium for Nebulization 3 milliLiter(s) Nebulizer every 6 hours  sodium chloride 7% Inhalation 4 milliLiter(s) Inhalation every 12 hours  acetaminophen     Tablet .. 650 milliGRAM(s) Oral every 6 hours PRN  fentaNYL    Injectable 25 MICROGram(s) IV Push every 4 hours PRN  LORazepam     Tablet 2 milliGRAM(s) Oral every 4 hours  melatonin 3 milliGRAM(s) Oral at bedtime PRN  methocarbamol 500 milliGRAM(s) Oral two times a day  ondansetron Injectable 4 milliGRAM(s) IV Push every 8 hours PRN  heparin   Injectable 5000 Unit(s) SubCutaneous every 12 hours  aluminum hydroxide/magnesium hydroxide/simethicone Suspension 30 milliLiter(s) Oral every 4 hours PRN  pantoprazole  Injectable 40 milliGRAM(s) IV Push daily  polyethylene glycol 3350 17 Gram(s) Oral every 12 hours  senna 3 Tablet(s) Oral at bedtime  simethicone 80 milliGRAM(s) Chew every 6 hours  dextrose 50% Injectable 25 Gram(s) IV Push once  dextrose 50% Injectable 12.5 Gram(s) IV Push once  dextrose 50% Injectable 25 Gram(s) IV Push once  dextrose Oral Gel 15 Gram(s) Oral once PRN  glucagon  Injectable 1 milliGRAM(s) IntraMuscular once  insulin glargine Injectable (LANTUS) 8 Unit(s) SubCutaneous every morning  insulin lispro (ADMELOG) corrective regimen sliding scale   SubCutaneous every 6 hours  dextrose 5%. 1000 milliLiter(s) IV Continuous <Continuous>  dextrose 5%. 1000 milliLiter(s) IV Continuous <Continuous>  folic acid 1 milliGRAM(s) Oral daily  multivitamin 1 Tablet(s) Oral daily  sodium chloride 0.9% lock flush 10 milliLiter(s) IV Push every 1 hour PRN  sodium chloride 0.9%. 1000 milliLiter(s) IV Continuous <Continuous>  chlorhexidine 0.12% Liquid 15 milliLiter(s) Oral Mucosa every 12 hours  chlorhexidine 2% Cloths 1 Application(s) Topical daily  collagenase Ointment 1 Application(s) Topical daily  nystatin Powder 1 Application(s) Topical three times a day  povidone iodine 10% Solution 1 Application(s) Topical daily  lactobacillus acidophilus 1 Tablet(s) Oral daily    Mode: AC/ CMV (Assist Control/ Continuous Mandatory Ventilation)  RR (machine): 16  TV (machine): 400  FiO2: 40  PEEP: 5  ITime: 1  MAP: 14  PIP: 26    ICU Vital Signs Last 24 Hrs  T(C): 36.8 (25 Aug 2022 19:25), Max: 37 (25 Aug 2022 00:16)  T(F): 98.3 (25 Aug 2022 19:25), Max: 98.6 (25 Aug 2022 00:16)  HR: 81 (25 Aug 2022 22:00) (64 - 83)  BP: 113/50 (25 Aug 2022 22:00) (100/57 - 128/65)  BP(mean): 69 (25 Aug 2022 22:00) (69 - 90)  RR: 21 (25 Aug 2022 22:00) (18 - 33)  SpO2: 95% (25 Aug 2022 22:00) (94% - 100%)    O2 Parameters below as of 25 Aug 2022 22:00  Patient On (Oxygen Delivery Method): ventilator    I&O's Detail    24 Aug 2022 07:01  -  25 Aug 2022 07:00  --------------------------------------------------------  IN:    Free Water: 400 mL    Glucerna: 1080 mL    IV PiggyBack: 100 mL  Total IN: 1580 mL    OUT:    Indwelling Catheter - Urethral (mL): 2000 mL    Voided (mL): 900 mL  Total OUT: 2900 mL    Total NET: -1320 mL  25 Aug 2022 07:01  -  25 Aug 2022 22:12  -----------------------  ---------------------------------  IN:    Free Water: 400 mL    Glucerna: 450 mL    IV PiggyBack: 200 mL    sodium chloride 0.9%: 750 mL  Total IN: 1800 mL    OUT:    Indwelling Catheter - Urethral (mL): 500 mL  Total OUT: 500 mL    Total NET: 1300 mL    LABS:                        8.7    9.25  )-----------( 364      ( 25 Aug 2022 06:00 )             28.7     08-25    133<L>  |  101  |  29<H>  ----------------------------<  155<H>  6.0<H>   |  27  |  1.53<H>    Ca    8.7      25 Aug 2022 06:00    TPro  7.5  /  Alb  2.1<L>  /  TBili  0.2  /  DBili  x   /  AST  16  /  ALT  17  /  AlkPhos  110  08-25      POCT Blood Glucose.: 145 mg/dL (25 Aug 2022 17:19)    CULTURES:  Culture Results:   Few Pseudomonas aeruginosa (Carbapenem Resistant)  Moderate Stenotrophomonas maltophilia  Normal Respiratory Elvira present (08-19 @ 20:45)    Physical Examination:  General: No acute distress.    HEENT: Pupils equal, reactive to light.  Symmetric.  PULM: Clear to auscultation bilaterally, no significant sputum production  NECK: Supple, no lymphadenopathy, trachea midline  CVS: Regular rate and rhythm, no murmurs, rubs, or gallops  ABD: Soft, nondistended, nontender, normoactive bowel sounds, no masses  EXT: No edema, nontender  SKIN: Warm and well perfused, no rashes noted.  NEURO: Alert, oriented, interactive, nonfocal  RADIOLOGY:   Patient is a 76y old  Female who presents with a chief complaint of respiratory distress (25 Aug 2022 13:16)    BRIEF HOSPITAL COURSE: Patient is a 75 yo female with a pmh of dementia, COPD with chronic resp failure;  vent dependent, s/p PEG, cardiac arrest, CHF, cor pulmonale, CVA, COVID-19, CKD, anemia, multiple admissions for respiratory distress (last admission from 6/1-6/9 diagnosed with PNA with parapneumonic pleural effusion s/p thoracentesis and Avycaz) who presented to  ED on 08/18/22 from [facility] with respiratory distress. In Ed patient was noted to have dyspnea and hypoxia. Work up revealed WBC 25.06, platelet 466, K 5.7, lactate 3.7, ABG 7.48/34/168. Patient was given empiric abx with vanco and zosyn, along with 1750 cc of IVF. Of note, patient admitted with outpatient midline. Patient admitted to [facility].      Events last 24 hours:   - Patient w/hyperkalemia to 6.0, ordered STAT BMP to reassess     Review of Systems:  Unable to assess given clinical condition     PAST MEDICAL & SURGICAL HISTORY:  Dementia of frontal lobe type  Aphasic stroke  Diabetes mellitus  Respiratory failure  Hypertension  GERD (gastroesophageal reflux disease)  Constipation  Respiratory failure  CVA (cerebral vascular accident)  HTN (hypertension)  DM (diabetes mellitus)  Advanced dementia  COVID-19 virus detected  Quadriplegia  Pneumonia  Type II diabetes mellitus  Hx of appendectomy  Gastrostomy in place  Tracheostomy in place  Tracheostomy tube present  Feeding by G-tube    Medications:  piperacillin/tazobactam IVPB.. 3.375 Gram(s) IV Intermittent every 8 hours  trimethoprim  160 mG/sulfamethoxazole 800 mG 2 Tablet(s) Oral two times a day  furosemide    Tablet 20 milliGRAM(s) Oral daily  albuterol/ipratropium for Nebulization 3 milliLiter(s) Nebulizer every 6 hours  sodium chloride 7% Inhalation 4 milliLiter(s) Inhalation every 12 hours  acetaminophen     Tablet .. 650 milliGRAM(s) Oral every 6 hours PRN  fentaNYL    Injectable 25 MICROGram(s) IV Push every 4 hours PRN  LORazepam     Tablet 2 milliGRAM(s) Oral every 4 hours  melatonin 3 milliGRAM(s) Oral at bedtime PRN  methocarbamol 500 milliGRAM(s) Oral two times a day  ondansetron Injectable 4 milliGRAM(s) IV Push every 8 hours PRN  heparin   Injectable 5000 Unit(s) SubCutaneous every 12 hours  aluminum hydroxide/magnesium hydroxide/simethicone Suspension 30 milliLiter(s) Oral every 4 hours PRN  pantoprazole  Injectable 40 milliGRAM(s) IV Push daily  polyethylene glycol 3350 17 Gram(s) Oral every 12 hours  senna 3 Tablet(s) Oral at bedtime  simethicone 80 milliGRAM(s) Chew every 6 hours  dextrose 50% Injectable 25 Gram(s) IV Push once  dextrose 50% Injectable 12.5 Gram(s) IV Push once  dextrose 50% Injectable 25 Gram(s) IV Push once  dextrose Oral Gel 15 Gram(s) Oral once PRN  glucagon  Injectable 1 milliGRAM(s) IntraMuscular once  insulin glargine Injectable (LANTUS) 8 Unit(s) SubCutaneous every morning  insulin lispro (ADMELOG) corrective regimen sliding scale   SubCutaneous every 6 hours  dextrose 5%. 1000 milliLiter(s) IV Continuous <Continuous>  dextrose 5%. 1000 milliLiter(s) IV Continuous <Continuous>  folic acid 1 milliGRAM(s) Oral daily  multivitamin 1 Tablet(s) Oral daily  sodium chloride 0.9% lock flush 10 milliLiter(s) IV Push every 1 hour PRN  sodium chloride 0.9%. 1000 milliLiter(s) IV Continuous <Continuous>  chlorhexidine 0.12% Liquid 15 milliLiter(s) Oral Mucosa every 12 hours  chlorhexidine 2% Cloths 1 Application(s) Topical daily  collagenase Ointment 1 Application(s) Topical daily  nystatin Powder 1 Application(s) Topical three times a day  povidone iodine 10% Solution 1 Application(s) Topical daily  lactobacillus acidophilus 1 Tablet(s) Oral daily    Mode: AC/ CMV (Assist Control/ Continuous Mandatory Ventilation)  RR (machine): 16  TV (machine): 400  FiO2: 40  PEEP: 5  ITime: 1  MAP: 14  PIP: 26    ICU Vital Signs Last 24 Hrs  T(C): 36.8 (25 Aug 2022 19:25), Max: 37 (25 Aug 2022 00:16)  T(F): 98.3 (25 Aug 2022 19:25), Max: 98.6 (25 Aug 2022 00:16)  HR: 81 (25 Aug 2022 22:00) (64 - 83)  BP: 113/50 (25 Aug 2022 22:00) (100/57 - 128/65)  BP(mean): 69 (25 Aug 2022 22:00) (69 - 90)  RR: 21 (25 Aug 2022 22:00) (18 - 33)  SpO2: 95% (25 Aug 2022 22:00) (94% - 100%)    O2 Parameters below as of 25 Aug 2022 22:00  Patient On (Oxygen Delivery Method): ventilator    I&O's Detail    24 Aug 2022 07:01  -  25 Aug 2022 07:00  --------------------------------------------------------  IN:    Free Water: 400 mL    Glucerna: 1080 mL    IV PiggyBack: 100 mL  Total IN: 1580 mL    OUT:    Indwelling Catheter - Urethral (mL): 2000 mL    Voided (mL): 900 mL  Total OUT: 2900 mL    Total NET: -1320 mL  25 Aug 2022 07:01  -  25 Aug 2022 22:12  -----------------------  ---------------------------------  IN:    Free Water: 400 mL    Glucerna: 450 mL    IV PiggyBack: 200 mL    sodium chloride 0.9%: 750 mL  Total IN: 1800 mL    OUT:    Indwelling Catheter - Urethral (mL): 500 mL  Total OUT: 500 mL    Total NET: 1300 mL    LABS:                        8.7    9.25  )-----------( 364      ( 25 Aug 2022 06:00 )             28.7     08-25    133<L>  |  101  |  29<H>  ----------------------------<  155<H>  6.0<H>   |  27  |  1.53<H>    Ca    8.7      25 Aug 2022 06:00    TPro  7.5  /  Alb  2.1<L>  /  TBili  0.2  /  DBili  x   /  AST  16  /  ALT  17  /  AlkPhos  110  08-25      POCT Blood Glucose.: 145 mg/dL (25 Aug 2022 17:19)    CULTURES:  Culture Results:   Few Pseudomonas aeruginosa (Carbapenem Resistant)  Moderate Stenotrophomonas maltophilia  Normal Respiratory Elvira present (08-19 @ 20:45)    Physical Examination:  General: No acute distress.    HEENT: Pupils equal, reactive to light.  Symmetric.  PULM: Diminished breath sounds b/l  NECK: Supple, no lymphadenopathy, trachea midline  CVS: Regular rate and rhythm, no murmurs, rubs, or gallops  ABD: Soft, nondistended, nontender, normoactive bowel sounds, no masses  EXT: No edema, nontender  SKIN: Warm and well perfused  RADIOLOGY:  < from: Xray Abdomen 1 View PORTABLE -Routine (Xray Abdomen 1 View PORTABLE -Routine in AM.) (08.23.22 @ 06:41) >    ACC: [id number] EXAM:  XR ABDOMEN PORTABLE ROUTINE 1V                          PROCEDURE DATE:  08/23/2022     INTERPRETATION:  KUB: AP    COMPARISON: CT abdomen 8/20/2022.    CLINICAL INFORMATION: Abdominal distention.    FINDINGS:  Patient's arms obscure diaphragms.  There is a mild diffuse bowel ileus..  No free intra-abdominal air.  No obvious intra-abdominal masses.  No abnormal calcifications.  The osseous structures are intact.    IMPRESSION:    Mild diffuse bowel ileus.    --- End ofReport ---    HANSEL LOJA MD; Attending Radiologist  This document has been electronically signed. Aug 25 2022  9:05AM    < end of copied text >

## 2022-08-25 NOTE — PROGRESS NOTE ADULT - SUBJECTIVE AND OBJECTIVE BOX
[facility], Division of Infectious Diseases  MOIZ Lora Y. Patel, S. Shah, G. [facility]  [phone]    Name: BREA BECKHAM  Age: 76y  Gender: Female  MRN: [id number]    Interval History:  Patient seen and examined at bedside  No acute overnight events. Afebrile  Notes reviewed    Antibiotics:  piperacillin/tazobactam IVPB.. 3.375 Gram(s) IV Intermittent every 8 hours  trimethoprim  160 mG/sulfamethoxazole 800 mG 2 Tablet(s) Oral two times a day      Medications:  acetaminophen     Tablet .. 650 milliGRAM(s) Oral every 6 hours PRN  albuterol/ipratropium for Nebulization 3 milliLiter(s) Nebulizer every 6 hours  aluminum hydroxide/magnesium hydroxide/simethicone Suspension 30 milliLiter(s) Oral every 4 hours PRN  chlorhexidine 0.12% Liquid 15 milliLiter(s) Oral Mucosa every 12 hours  chlorhexidine 2% Cloths 1 Application(s) Topical daily  collagenase Ointment 1 Application(s) Topical daily  dextrose 5%. 1000 milliLiter(s) IV Continuous <Continuous>  dextrose 5%. 1000 milliLiter(s) IV Continuous <Continuous>  dextrose 50% Injectable 25 Gram(s) IV Push once  dextrose 50% Injectable 12.5 Gram(s) IV Push once  dextrose 50% Injectable 25 Gram(s) IV Push once  dextrose Oral Gel 15 Gram(s) Oral once PRN  fentaNYL    Injectable 25 MICROGram(s) IV Push every 4 hours PRN  folic acid 1 milliGRAM(s) Oral daily  furosemide    Tablet 20 milliGRAM(s) Oral daily  glucagon  Injectable 1 milliGRAM(s) IntraMuscular once  heparin   Injectable 5000 Unit(s) SubCutaneous every 12 hours  insulin glargine Injectable (LANTUS) 8 Unit(s) SubCutaneous every morning  insulin lispro (ADMELOG) corrective regimen sliding scale   SubCutaneous every 6 hours  lactobacillus acidophilus 1 Tablet(s) Oral daily  LORazepam     Tablet 2 milliGRAM(s) Oral every 4 hours  melatonin 3 milliGRAM(s) Oral at bedtime PRN  methocarbamol 500 milliGRAM(s) Oral two times a day  multivitamin 1 Tablet(s) Oral daily  nystatin Powder 1 Application(s) Topical three times a day  ondansetron Injectable 4 milliGRAM(s) IV Push every 8 hours PRN  pantoprazole  Injectable 40 milliGRAM(s) IV Push daily  piperacillin/tazobactam IVPB.. 3.375 Gram(s) IV Intermittent every 8 hours  polyethylene glycol 3350 17 Gram(s) Oral every 12 hours  povidone iodine 10% Solution 1 Application(s) Topical daily  senna 3 Tablet(s) Oral at bedtime  simethicone 80 milliGRAM(s) Chew every 6 hours  sodium chloride 0.9% lock flush 10 milliLiter(s) IV Push every 1 hour PRN  sodium chloride 0.9%. 1000 milliLiter(s) IV Continuous <Continuous>  sodium chloride 7% Inhalation 4 milliLiter(s) Inhalation every 12 hours  sodium zirconium cyclosilicate 10 Gram(s) Oral two times a day  trimethoprim  160 mG/sulfamethoxazole 800 mG 2 Tablet(s) Oral two times a day      Review of Systems:  unable to obtain    Allergies: codeine (Hives)    For details regarding the patient's past medical history, social history, family history, and other miscellaneous elements, please refer the initial infectious diseases consultation and/or the admitting history and physical examination for this admission.    Objective:  Vitals:   T(C): 36.9 (08-25-22 @ 12:36), Max: 37 (08-25-22 @ 00:16)  HR: 66 (08-25-22 @ 10:32) (58 - 98)  BP: 111/61 (08-25-22 @ 10:00) (89/53 - 128/65)  RR: 21 (08-25-22 @ 10:00) (16 - 33)  SpO2: 100% (08-25-22 @ 10:32) (94% - 100%)    Mode: PRVC  RR (machine): 16  TV (machine): 400  FiO2: 40  PEEP: 5  ITime: 1  MAP: 17  PIP: 34      Physical Examination:  General: no acute distress  HEENT: NC/AT  Neck: trach  Cardio: RRR  Resp: MV  breath sounds  Abd: soft, NT, ND  Neuro: no obvious focal deficits  Ext: no edema or cyanosis  Skin: warm, dry, no visible rash      Laboratory Studies:  CBC:                       8.7    9.25  )-----------( 364      ( 25 Aug 2022 06:00 )             28.7     CMP: 08-25    133<L>  |  101  |  29<H>  ----------------------------<  155<H>  6.0<H>   |  27  |  1.53<H>    Ca    8.7      25 Aug 2022 06:00    TPro  7.5  /  Alb  2.1<L>  /  TBili  0.2  /  DBili  x   /  AST  16  /  ALT  17  /  AlkPhos  110  08-25    LIVER FUNCTIONS - ( 25 Aug 2022 06:00 )  Alb: 2.1 g/dL / Pro: 7.5 g/dL / ALK PHOS: 110 U/L / ALT: 17 U/L DA / AST: 16 U/L / GGT: x               Microbiology: reviewed    Culture - Sputum (collected 08-19-22 @ 20:45)  Source: ET Tube ET Tube  Gram Stain (08-20-22 @ 03:35):    Few polymorphonuclear leukocytes per low power field    Rare Squamous epithelial cells per low power field    Few Gram Negative Rods per oil power field  Final Report (08-22-22 @ 17:12):    Few Pseudomonas aeruginosa (Carbapenem Resistant)    Moderate Stenotrophomonas maltophilia    Normal Respiratory Elvira present  Organism: Pseudomonas aeruginosa (Carbapenem Resistant)  Stenotrophomonas maltophilia (08-22-22 @ 17:12)  Organism: Stenotrophomonas maltophilia (08-22-22 @ 17:12)      -  Levofloxacin: S <=0.5      -  Trimethoprim/Sulfamethoxazole: S <=0.5/9.5      Method Type: PRATIK  Organism: Pseudomonas aeruginosa (Carbapenem Resistant) (08-22-22 @ 17:12)      -  Amikacin: S <=16      -  Aztreonam: R >16      -  Cefepime: R >16      -  Ceftazidime: R >16      -  Ceftazidime/Avibactam: R >16      -  Ceftolozane/tazobactam: S <=2      -  Ciprofloxacin: I 1      -  Gentamicin: S <=2      -  Imipenem: R >8      -  Levofloxacin: S <=0.5      -  Meropenem: R >8      -  Piperacillin/Tazobactam: I 64      -  Tobramycin: I 8      Method Type: PRATIK    Culture - Urine (collected 08-18-22 @ 20:00)  Source: Clean Catch Clean Catch (Midstream)  Final Report (08-21-22 @ 21:14):    10,000 - 49,000 CFU/mL Candida albicans "Susceptibilities not performed"    Culture - Blood (collected 08-18-22 @ 19:14)  Source: .Blood Blood-Peripheral  Final Report (08-24-22 @ 01:01):    No Growth Final    Culture - Blood (collected 08-18-22 @ 18:45)  Source: .Blood Blood-Peripheral  Final Report (08-24-22 @ 01:01):    No Growth Final        Radiology: reviewed

## 2022-08-25 NOTE — PROGRESS NOTE ADULT - ASSESSMENT
76F with dementia, COPD with chronic resp failure and is vent dependent, s/p PEG, hx of cardiac arrest, CHF, cor pulmonale, CVA, COVID-19 infxn, CKD, anemia, multiple admissions for respiratory distress (last admission from 6/1-6/9 diagnosed with PNA with parapneumonic pleural effusion s/p thoracentesis and Avycaz) who presents from [facility] for respiratory distress    GI cx noted  labs reviewed  PO Lasix added  cm follow up noted    ID eval noted - emp ABX in progress  cardio eval noted  vent support  GOC documented   is the HCP  pt is full code  old records reviewed  [facility] supportive care in progress  Fentanyl PRN on order for pain - sedation management

## 2022-08-25 NOTE — PROGRESS NOTE ADULT - ASSESSMENT
76F with dementia, COPD with chronic resp failure and is vent dependent, s/p PEG, hx of cardiac arrest, CHF, cor pulmonale, CVA, anemia, multiple admissions for respiratory distress who presents from [facility] for respiratory distress again.     Sepsis 2/2 PNA  Acute on chronic RF  B/l pleural effusions  - most recent cultures w/ serratia and CRE pseudomonas  - imaging reviewed  - cx reviewed. BCx NGTD. UCx Candida. RCx NOF  Plan  C/w Zosyn  C/w bactrim  Last day Abx today  Supportive care, O2 and pulm toilet  trend temps/ wbc    Infectious Diseases will continue to follow. Please call with any questions.   Andressa Brantley M.D.  [facility], Division of Infectious Diseases [phone]

## 2022-08-25 NOTE — CHART NOTE - NSCHARTNOTEFT_GEN_A_CORE
Nutrition Follow Up Note  Patient seen for: Nutrition consult received for nutrition assessment 2/2 to increased potassium levels    Chart reviewed, events noted: As per chart "The pt is a 76F with dementia, COPD with chronic resp failure and is vent dependent, s/p PEG, hx of cardiac arrest, CHF, cor pulmonale, CVA, COVID-19 infxn, CKD, anemia, multiple admissions for respiratory distress (last admission from 6/1-6/9 diagnosed with PNA with parapneumonic pleural effusion s/p thoracentesis and Avycaz) who presents from [facility] for respiratory distress again.   Likely with repeat PNA especially given new CXR findings.   Meets severe sepsis criteria with tachypnea + leukocytosis + lactic acid + source of infection."    8/25  Pt seen for nutrition consult secondary to elevated K levels. Pt remains intubated, on tube feeds receiving Glucerna 1.5 @ goal rate 45ml/hr, per RN pt with loose BM's, last BM today (8/25) per nursing flow sheets, noted pt on senna. Potassium levels trending upward, will trial Nepro 1.8, initiate at 20 ml/hr and advance 10 ml/hr q4 hrs until goal rate of 40 ml/hr (x20 hrs) to provide 800 ml, 1440 kcal, 65g protein, 582 ml free water. Additional free water per MD discretion. Continue to monitor renal indices, per hospitalist "consider nephro consult". RD to remains available to adjust EN formulary, volume/rate as needed.     Source: [] Patient       [x] EMR        [x] RN        [] Family at bedside       [] Other:    -If unable to interview patient: [x] Trach/Vent/BiPAP  [] Disoriented/confused/inappropriate to interview    Diet Order:   Diet, NPO with Tube Feed:   Tube Feeding Modality: Gastrostomy  Glucerna 1.5 Horacio  Total Volume for 24 Hours (mL): 900  Continuous  Starting Tube Feed Rate {mL per Hour}: 45  Until Goal Tube Feed Rate (mL per Hour): 45  Tube Feed Duration (in Hours): 20  Tube Feed Start Time: 00:00  Free Water Flush  Bolus   Total Volume per Flush (mL): 200   Frequency: Every 6 Hours   Total Daily Volume of Flush (mL): 800    Start Time: 00:00 (08-19-22)    - Is current order appropriate/adequate? [x] Yes  []  No:     Intake:   (8/24) 1080 ml (100% EN)  (8/25) currently running at goal rate of 45ml/hr     Weights:   Daily Weight in lbs: 105.6 lbs(8/25), 105.6 lbs (8/24), 114.1 lbs (8/22). Of note, pt receiving diuretics in-house. Weight changes likely secondary to fluid shifts. Will continue to monitor weights and trend as able.    Nutritionally Pertinent MEDICATIONS  (STANDING):  dextrose 5%.  dextrose 5%.  dextrose 50% Injectable  dextrose 50% Injectable  dextrose 50% Injectable  folic acid  furosemide    Tablet  glucagon  Injectable  insulin glargine Injectable (LANTUS)  insulin lispro (ADMELOG) corrective regimen sliding scale  multivitamin  pantoprazole  Injectable  piperacillin/tazobactam IVPB..  polyethylene glycol 3350  senna  simethicone  sodium chloride 0.9%.  trimethoprim  160 mG/sulfamethoxazole 800 mG    Pertinent Labs: 08-25 @ 06:00: Na 133<L>, BUN 29<H>, Cr 1.53<H>, <H>, K+ 6.0<H>, Phos --, Mg --, Alk Phos 110, ALT/SGPT 17, AST/SGOT 16, HbA1c --  08-24 @ 19:41: Na 132<L>, BUN 31<H>, Cr 1.41<H>, <H>, K+ 5.9<H>    A1C with Estimated Average Glucose Result: 7.3 % (08-19-22 @ 06:36)  A1C with Estimated Average Glucose Result: 6.4 % (04-22-22 @ 12:14)    Finger Sticks:  POCT Blood Glucose.: 164 mg/dL (08-25 @ 11:52)  POCT Blood Glucose.: 152 mg/dL (08-25 @ 08:32)  POCT Blood Glucose.: 157 mg/dL (08-25 @ 05:37)  POCT Blood Glucose.: 132 mg/dL (08-24 @ 23:07)  POCT Blood Glucose.: 137 mg/dL (08-24 @ 20:37)  POCT Blood Glucose.: 139 mg/dL (08-24 @ 17:45)      Skin per nursing documentation: Pressure Injury 1: right buttock, Pressure Injury 2: Left:,first toe, Unstageable, Pressure Injury 3: first toe, Unstageable  Edema: no edema noted per nursing flow sheets     Estimated Needs: The Ziyad State Equation (PSU) 2003b was used to calculate resting energy expenditure: 1106kcal/day (8/25) based on most current wt of 105.6 lbs (8/24).  [x] no change since previous assessment  [] recalculated:     Previous Nutrition Diagnosis:   Nutrition Diagnosis is: [x] ongoing  [] resolved [] not applicable     New Nutrition Diagnosis: [x] Not applicable    Nutrition Care Plan:  [x] In Progress  [] Achieved  [] Not applicable    Nutrition Interventions:     Education Provided:       [] Yes:  [x] No:        Recommendations:      1) Change TF formulary to Nepro 1.8 @ goal rate 40ml/hr and monitor renal indices   2) Continue to monitor BG  3) RD to remain available.     Monitoring and Evaluation:   Continue to monitor nutritional intake, tolerance to diet prescription, weights, labs, skin integrity      RD remains available upon request and will follow up per protocol

## 2022-08-25 NOTE — PROGRESS NOTE ADULT - ASSESSMENT
Assessment:  Patient is a 75 yo female with a pmh of dementia, COPD with chronic resp failure;  vent dependent, s/p PEG, cardiac arrest, CHF, cor pulmonale, CVA, COVID-19, CKD, anemia, who presented to  ED with resp distress, admitted to [facility].    Problem List:  - Acute on chronic hypoxic resp failure  - Sepsis  - Pleural effusions   - Systolic HF exacerbation / pulm edema  - Hyperkalemia   - Anemia   - RYAN on CKD    Plan:  Neuro: Monitor mental status, avoid deliriogenic medications. Pain & fever control as needed. Ativan & fentanyl   CV: HD stable. Monitor HR and BP. C/w Lasix   Pulm: Chronic trache to vent, c/w ventilator. Duonebs   Renal: Hyperkalemia, repeat labs. c/w lokelma and lasix, may require another cocktail. Strict I/Os, goal UOP >0.5cc/kg/hr. Trend renal function and electrolytes, replete as needed. Avoid nephrotoxic agents  GI: PPI & tube feeds   ID: Growing Stenotrophomonas from ETT. c/w Zosyn & Bactrim. Trend WBC/fevers/procalcitonin   Heme: HSQ for DVT prophylaxis   Endo: Goal blood glucose <180. C/w Lantus and ISS

## 2022-08-25 NOTE — PROGRESS NOTE ADULT - SUBJECTIVE AND OBJECTIVE BOX
INTERVAL HPI/OVERNIGHT EVENTS:  No new overnight event.  No N/V/D.  Tolerating diet.    Allergies    codeine (Hives)    Intolerances    General:  No wt loss, fevers, chills, night sweats, fatigue,   Eyes:  Good vision, no reported pain  ENT:  No sore throat, pain, runny nose, dysphagia  CV:  No pain, palpitations, hypo/hypertension  Resp:  No dyspnea, cough, tachypnea, wheezing  GI:  No pain, No nausea, No vomiting, No diarrhea, No constipation, No weight loss, No fever, No pruritis, No rectal bleeding, No tarry stools, No dysphagia,  :  No pain, bleeding, incontinence, nocturia  Muscle:  No pain, weakness  Neuro:  No weakness, tingling, memory problems  Psych:  No fatigue, insomnia, mood problems, depression  Endocrine:  No polyuria, polydipsia, cold/heat intolerance  Heme:  No petechiae, ecchymosis, easy bruisability  Skin:  No rash, tattoos, scars, edema      PHYSICAL EXAM:   Vital Signs:  Vital Signs Last 24 Hrs  T(C): 36.8 (25 Aug 2022 04:00), Max: 37 (25 Aug 2022 00:16)  T(F): 98.2 (25 Aug 2022 04:00), Max: 98.6 (25 Aug 2022 00:16)  HR: 68 (25 Aug 2022 07:08) (55 - 98)  BP: 108/61 (25 Aug 2022 07:00) (86/54 - 128/65)  BP(mean): 77 (25 Aug 2022 07:00) (61 - 90)  RR: 21 (25 Aug 2022 07:00) (16 - 33)  SpO2: 100% (25 Aug 2022 07:08) (74% - 100%)    Parameters below as of 25 Aug 2022 07:08  Patient On (Oxygen Delivery Method): ventilator,.50      Daily     Daily I&O's Summary    24 Aug 2022 07:01  -  25 Aug 2022 07:00  --------------------------------------------------------  IN: 1580 mL / OUT: 2900 mL / NET: -1320 mL        GENERAL:  Appears stated age, well-groomed, well-nourished, no distress  HEENT:  NC/AT,  conjunctivae clear and pink, no thyromegaly, nodules, adenopathy, no JVD, sclera -anicteric  CHEST:  Full & symmetric excursion, no increased effort, breath sounds clear  HEART:  Regular rhythm, S1, S2, no murmur/rub/S3/S4, no abdominal bruit, no edema  ABDOMEN:  Soft, non-tender, non-distended, normoactive bowel sounds,  no masses ,no hepato-splenomegaly, no signs of chronic liver disease  EXTEREMITIES:  no cyanosis,clubbing or edema  SKIN:  No rash/erythema/ecchymoses/petechiae/wounds/abscess/warm/dry  NEURO:  Alert, oriented, no asterixis, no tremor, no encephalopathy      LABS:                        8.7    9.25  )-----------( 364      ( 25 Aug 2022 06:00 )             28.7     08-25    133<L>  |  101  |  29<H>  ----------------------------<  155<H>  6.0<H>   |  27  |  1.53<H>    Ca    8.7      25 Aug 2022 06:00    TPro  7.5  /  Alb  2.1<L>  /  TBili  0.2  /  DBili  x   /  AST  16  /  ALT  17  /  AlkPhos  110  08-25        amylase   lipase  RADIOLOGY & ADDITIONAL TESTS:

## 2022-08-25 NOTE — PROGRESS NOTE ADULT - NUTRITIONAL ASSESSMENT
This patient has been assessed with a concern for Malnutrition and has been determined to have a diagnosis/diagnoses of Severe protein-calorie malnutrition.    This patient is being managed with:   Diet NPO with Tube Feed-  Tube Feeding Modality: Gastrostomy  Glucerna 1.5 Horacio  Total Volume for 24 Hours (mL): 900  Continuous  Starting Tube Feed Rate {mL per Hour}: 45  Until Goal Tube Feed Rate (mL per Hour): 45  Tube Feed Duration (in Hours): 20  Tube Feed Start Time: 00:00  Free Water Flush  Bolus   Total Volume per Flush (mL): 200   Frequency: Every 6 Hours   Total Daily Volume of Flush (mL): 800    Start Time: 00:00  Entered: Aug 19 2022  1:25PM    
This patient has been assessed with a concern for Malnutrition and has been determined to have a diagnosis/diagnoses of Severe protein-calorie malnutrition.    This patient is being managed with:   Diet NPO with Tube Feed-  Tube Feeding Modality: Gastrostomy  Nepro with Carb Steady  Total Volume for 24 Hours (mL): 800  Continuous  Starting Tube Feed Rate {mL per Hour}: 20  Increase Tube Feed Rate by (mL): 10     Every 4 hours  Until Goal Tube Feed Rate (mL per Hour): 40  Tube Feed Duration (in Hours): 20  Tube Feed Start Time: 00:00  Entered: Aug 25 2022  4:39PM    Diet NPO with Tube Feed-  Tube Feeding Modality: Gastrostomy  Glucerna 1.5 Horacio  Total Volume for 24 Hours (mL): 900  Continuous  Starting Tube Feed Rate {mL per Hour}: 45  Until Goal Tube Feed Rate (mL per Hour): 45  Tube Feed Duration (in Hours): 20  Tube Feed Start Time: 00:00  Free Water Flush  Bolus   Total Volume per Flush (mL): 200   Frequency: Every 6 Hours   Total Daily Volume of Flush (mL): 800    Start Time: 00:00  Entered: Aug 19 2022  1:25PM    The following pending diet order is being considered for treatment of Severe protein-calorie malnutrition:null
This patient has been assessed with a concern for Malnutrition and has been determined to have a diagnosis/diagnoses of Severe protein-calorie malnutrition.    This patient is being managed with:   Diet NPO with Tube Feed-  Tube Feeding Modality: Gastrostomy  Glucerna 1.5 Horacio  Total Volume for 24 Hours (mL): 900  Continuous  Starting Tube Feed Rate {mL per Hour}: 45  Until Goal Tube Feed Rate (mL per Hour): 45  Tube Feed Duration (in Hours): 20  Tube Feed Start Time: 00:00  Free Water Flush  Bolus   Total Volume per Flush (mL): 200   Frequency: Every 6 Hours   Total Daily Volume of Flush (mL): 800    Start Time: 00:00  Entered: Aug 19 2022  1:25PM    
This patient has been assessed with a concern for Malnutrition and has been determined to have a diagnosis/diagnoses of Severe protein-calorie malnutrition.    This patient is being managed with:   Diet NPO with Tube Feed-  Tube Feeding Modality: Gastrostomy  Glucerna 1.5 Horacio  Total Volume for 24 Hours (mL): 900  Continuous  Starting Tube Feed Rate {mL per Hour}: 45  Until Goal Tube Feed Rate (mL per Hour): 45  Tube Feed Duration (in Hours): 20  Tube Feed Start Time: 00:00  Free Water Flush  Bolus   Total Volume per Flush (mL): 200   Frequency: Every 6 Hours   Total Daily Volume of Flush (mL): 800    Start Time: 00:00  Entered: Aug 19 2022  1:25PM

## 2022-08-25 NOTE — PROGRESS NOTE ADULT - SUBJECTIVE AND OBJECTIVE BOX
Patient is a 76y old  Female who presents with a chief complaint of respiratory distress (25 Aug 2022 10:08)      INTERVAL HPI/OVERNIGHT EVENTS:    Potassium remains elevated  creatinine elevated      MEDICATIONS  (STANDING):  albuterol/ipratropium for Nebulization 3 milliLiter(s) Nebulizer every 6 hours  chlorhexidine 0.12% Liquid 15 milliLiter(s) Oral Mucosa every 12 hours  chlorhexidine 2% Cloths 1 Application(s) Topical daily  collagenase Ointment 1 Application(s) Topical daily  dextrose 5%. 1000 milliLiter(s) (50 mL/Hr) IV Continuous <Continuous>  dextrose 5%. 1000 milliLiter(s) (100 mL/Hr) IV Continuous <Continuous>  dextrose 50% Injectable 25 Gram(s) IV Push once  dextrose 50% Injectable 25 Gram(s) IV Push once  dextrose 50% Injectable 12.5 Gram(s) IV Push once  folic acid 1 milliGRAM(s) Oral daily  furosemide    Tablet 20 milliGRAM(s) Oral daily  glucagon  Injectable 1 milliGRAM(s) IntraMuscular once  heparin   Injectable 5000 Unit(s) SubCutaneous every 12 hours  insulin glargine Injectable (LANTUS) 8 Unit(s) SubCutaneous every morning  insulin lispro (ADMELOG) corrective regimen sliding scale   SubCutaneous every 6 hours  lactobacillus acidophilus 1 Tablet(s) Oral daily  LORazepam     Tablet 2 milliGRAM(s) Oral every 4 hours  methocarbamol 500 milliGRAM(s) Oral two times a day  multivitamin 1 Tablet(s) Oral daily  nystatin Powder 1 Application(s) Topical three times a day  pantoprazole  Injectable 40 milliGRAM(s) IV Push daily  piperacillin/tazobactam IVPB.. 3.375 Gram(s) IV Intermittent every 8 hours  polyethylene glycol 3350 17 Gram(s) Oral every 12 hours  povidone iodine 10% Solution 1 Application(s) Topical daily  senna 3 Tablet(s) Oral at bedtime  simethicone 80 milliGRAM(s) Chew every 6 hours  sodium chloride 0.9%. 1000 milliLiter(s) (75 mL/Hr) IV Continuous <Continuous>  sodium chloride 7% Inhalation 4 milliLiter(s) Inhalation every 12 hours  sodium zirconium cyclosilicate 10 Gram(s) Oral two times a day  trimethoprim  160 mG/sulfamethoxazole 800 mG 2 Tablet(s) Oral two times a day    MEDICATIONS  (PRN):  acetaminophen     Tablet .. 650 milliGRAM(s) Oral every 6 hours PRN Temp greater or equal to 38C (100.4F), Mild Pain (1 - 3)  aluminum hydroxide/magnesium hydroxide/simethicone Suspension 30 milliLiter(s) Oral every 4 hours PRN Dyspepsia  dextrose Oral Gel 15 Gram(s) Oral once PRN Blood Glucose LESS THAN 70 milliGRAM(s)/deciliter  fentaNYL    Injectable 25 MICROGram(s) IV Push every 4 hours PRN Moderate Pain (4 - 6), agitation  melatonin 3 milliGRAM(s) Oral at bedtime PRN Insomnia  ondansetron Injectable 4 milliGRAM(s) IV Push every 8 hours PRN Nausea and/or Vomiting  sodium chloride 0.9% lock flush 10 milliLiter(s) IV Push every 1 hour PRN Pre/post blood products, medications, blood draw, and to maintain line patency      Allergies    codeine (Hives)    Intolerances        REVIEW OF SYSTEMS:  unable to obtain 2/2 mental state    Vital Signs Last 24 Hrs  T(C): 36.8 (25 Aug 2022 04:00), Max: 37 (25 Aug 2022 00:16)  T(F): 98.2 (25 Aug 2022 04:00), Max: 98.6 (25 Aug 2022 00:16)  HR: 66 (25 Aug 2022 10:32) (55 - 98)  BP: 111/61 (25 Aug 2022 10:00) (86/54 - 128/65)  BP(mean): 77 (25 Aug 2022 10:00) (65 - 90)  RR: 21 (25 Aug 2022 10:00) (16 - 33)  SpO2: 100% (25 Aug 2022 10:32) (74% - 100%)    Parameters below as of 25 Aug 2022 10:00  Patient On (Oxygen Delivery Method): ventilator        PHYSICAL EXAM:  GENERAL: chronically ill-appearing  HEENT:  anicteric, dry mucous membranes ; trach/vent  CHEST/LUNG:  decreased breath sounds b/l   HEART:  RRR, S1, S2  ABDOMEN:  BS+, soft, no apparent tenderness, distended; G-tube in place with some maceration at entry point  EXTREMITIES: no cyanosis or apparent calf tenderness  NERVOUS SYSTEM: nonverbal, does not follow commands   LABS:                        8.7    9.25  )-----------( 364      ( 25 Aug 2022 06:00 )             28.7     25 Aug 2022 06:00    133    |  101    |  29     ----------------------------<  155    6.0     |  27     |  1.53     Ca    8.7        25 Aug 2022 06:00    TPro  7.5    /  Alb  2.1    /  TBili  0.2    /  DBili  x      /  AST  16     /  ALT  17     /  AlkPhos  110    25 Aug 2022 06:00        CAPILLARY BLOOD GLUCOSE      POCT Blood Glucose.: 152 mg/dL (25 Aug 2022 08:32)  POCT Blood Glucose.: 157 mg/dL (25 Aug 2022 05:37)  POCT Blood Glucose.: 132 mg/dL (24 Aug 2022 23:07)  POCT Blood Glucose.: 137 mg/dL (24 Aug 2022 20:37)  POCT Blood Glucose.: 139 mg/dL (24 Aug 2022 17:45)  POCT Blood Glucose.: 153 mg/dL (24 Aug 2022 12:27)      RADIOLOGY & ADDITIONAL TESTS:

## 2022-08-26 ENCOUNTER — TRANSCRIPTION ENCOUNTER (OUTPATIENT)
Age: 77
End: 2022-08-26

## 2022-08-26 VITALS — HEART RATE: 93 BPM | RESPIRATION RATE: 21 BRPM | OXYGEN SATURATION: 99 %

## 2022-08-26 LAB
ALBUMIN SERPL ELPH-MCNC: 2 G/DL — LOW (ref 3.3–5)
ALP SERPL-CCNC: 94 U/L — SIGNIFICANT CHANGE UP (ref 30–120)
ALT FLD-CCNC: 16 U/L DA — SIGNIFICANT CHANGE UP (ref 10–60)
ANION GAP SERPL CALC-SCNC: 5 MMOL/L — SIGNIFICANT CHANGE UP (ref 5–17)
AST SERPL-CCNC: 15 U/L — SIGNIFICANT CHANGE UP (ref 10–40)
BILIRUB SERPL-MCNC: 0.3 MG/DL — SIGNIFICANT CHANGE UP (ref 0.2–1.2)
BUN SERPL-MCNC: 25 MG/DL — HIGH (ref 7–23)
BUN SERPL-MCNC: 26 MG/DL — HIGH (ref 7–23)
BUN SERPL-MCNC: 27 MG/DL — HIGH (ref 7–23)
CALCIUM SERPL-MCNC: 8.5 MG/DL — SIGNIFICANT CHANGE UP (ref 8.4–10.5)
CALCIUM SERPL-MCNC: 8.7 MG/DL — SIGNIFICANT CHANGE UP (ref 8.4–10.5)
CALCIUM SERPL-MCNC: 8.8 MG/DL — SIGNIFICANT CHANGE UP (ref 8.4–10.5)
CHLORIDE SERPL-SCNC: 103 MMOL/L — SIGNIFICANT CHANGE UP (ref 96–108)
CHLORIDE SERPL-SCNC: 103 MMOL/L — SIGNIFICANT CHANGE UP (ref 96–108)
CHLORIDE SERPL-SCNC: 104 MMOL/L — SIGNIFICANT CHANGE UP (ref 96–108)
CO2 SERPL-SCNC: 27 MMOL/L — SIGNIFICANT CHANGE UP (ref 22–31)
CO2 SERPL-SCNC: 28 MMOL/L — SIGNIFICANT CHANGE UP (ref 22–31)
CO2 SERPL-SCNC: 29 MMOL/L — SIGNIFICANT CHANGE UP (ref 22–31)
CREAT SERPL-MCNC: 1.41 MG/DL — HIGH (ref 0.5–1.3)
CREAT SERPL-MCNC: 1.43 MG/DL — HIGH (ref 0.5–1.3)
CREAT SERPL-MCNC: 1.47 MG/DL — HIGH (ref 0.5–1.3)
EGFR: 36 ML/MIN/1.73M2 — LOW
EGFR: 38 ML/MIN/1.73M2 — LOW
EGFR: 38 ML/MIN/1.73M2 — LOW
GLUCOSE SERPL-MCNC: 104 MG/DL — HIGH (ref 70–99)
GLUCOSE SERPL-MCNC: 133 MG/DL — HIGH (ref 70–99)
GLUCOSE SERPL-MCNC: 161 MG/DL — HIGH (ref 70–99)
HCT VFR BLD CALC: 27.8 % — LOW (ref 34.5–45)
HGB BLD-MCNC: 8.4 G/DL — LOW (ref 11.5–15.5)
MAGNESIUM SERPL-MCNC: 2.3 MG/DL — SIGNIFICANT CHANGE UP (ref 1.6–2.6)
MAGNESIUM SERPL-MCNC: 2.4 MG/DL — SIGNIFICANT CHANGE UP (ref 1.6–2.6)
MCHC RBC-ENTMCNC: 26.8 PG — LOW (ref 27–34)
MCHC RBC-ENTMCNC: 30.2 GM/DL — LOW (ref 32–36)
MCV RBC AUTO: 88.5 FL — SIGNIFICANT CHANGE UP (ref 80–100)
NRBC # BLD: 0 /100 WBCS — SIGNIFICANT CHANGE UP (ref 0–0)
PHOSPHATE SERPL-MCNC: 4.4 MG/DL — SIGNIFICANT CHANGE UP (ref 2.5–4.5)
PHOSPHATE SERPL-MCNC: 4.5 MG/DL — SIGNIFICANT CHANGE UP (ref 2.5–4.5)
PLATELET # BLD AUTO: 333 K/UL — SIGNIFICANT CHANGE UP (ref 150–400)
POTASSIUM SERPL-MCNC: 5 MMOL/L — SIGNIFICANT CHANGE UP (ref 3.5–5.3)
POTASSIUM SERPL-MCNC: 5.3 MMOL/L — SIGNIFICANT CHANGE UP (ref 3.5–5.3)
POTASSIUM SERPL-MCNC: 5.4 MMOL/L — HIGH (ref 3.5–5.3)
POTASSIUM SERPL-SCNC: 5 MMOL/L — SIGNIFICANT CHANGE UP (ref 3.5–5.3)
POTASSIUM SERPL-SCNC: 5.3 MMOL/L — SIGNIFICANT CHANGE UP (ref 3.5–5.3)
POTASSIUM SERPL-SCNC: 5.4 MMOL/L — HIGH (ref 3.5–5.3)
PROT SERPL-MCNC: 7.2 G/DL — SIGNIFICANT CHANGE UP (ref 6–8.3)
RBC # BLD: 3.14 M/UL — LOW (ref 3.8–5.2)
RBC # FLD: 17.1 % — HIGH (ref 10.3–14.5)
SARS-COV-2 RNA SPEC QL NAA+PROBE: SIGNIFICANT CHANGE UP
SODIUM SERPL-SCNC: 136 MMOL/L — SIGNIFICANT CHANGE UP (ref 135–145)
SODIUM SERPL-SCNC: 136 MMOL/L — SIGNIFICANT CHANGE UP (ref 135–145)
SODIUM SERPL-SCNC: 137 MMOL/L — SIGNIFICANT CHANGE UP (ref 135–145)
WBC # BLD: 8.89 K/UL — SIGNIFICANT CHANGE UP (ref 3.8–10.5)
WBC # FLD AUTO: 8.89 K/UL — SIGNIFICANT CHANGE UP (ref 3.8–10.5)

## 2022-08-26 PROCEDURE — 96375 TX/PRO/DX INJ NEW DRUG ADDON: CPT

## 2022-08-26 PROCEDURE — 85027 COMPLETE CBC AUTOMATED: CPT

## 2022-08-26 PROCEDURE — 93306 TTE W/DOPPLER COMPLETE: CPT

## 2022-08-26 PROCEDURE — 83550 IRON BINDING TEST: CPT

## 2022-08-26 PROCEDURE — 87070 CULTURE OTHR SPECIMN AEROBIC: CPT

## 2022-08-26 PROCEDURE — 84100 ASSAY OF PHOSPHORUS: CPT

## 2022-08-26 PROCEDURE — 83036 HEMOGLOBIN GLYCOSYLATED A1C: CPT

## 2022-08-26 PROCEDURE — 71250 CT THORAX DX C-: CPT | Mod: MA

## 2022-08-26 PROCEDURE — 87040 BLOOD CULTURE FOR BACTERIA: CPT

## 2022-08-26 PROCEDURE — 71045 X-RAY EXAM CHEST 1 VIEW: CPT

## 2022-08-26 PROCEDURE — 94760 N-INVAS EAR/PLS OXIMETRY 1: CPT

## 2022-08-26 PROCEDURE — 80053 COMPREHEN METABOLIC PANEL: CPT

## 2022-08-26 PROCEDURE — P9047: CPT

## 2022-08-26 PROCEDURE — 94640 AIRWAY INHALATION TREATMENT: CPT

## 2022-08-26 PROCEDURE — 36415 COLL VENOUS BLD VENIPUNCTURE: CPT

## 2022-08-26 PROCEDURE — 87077 CULTURE AEROBIC IDENTIFY: CPT

## 2022-08-26 PROCEDURE — 82272 OCCULT BLD FECES 1-3 TESTS: CPT

## 2022-08-26 PROCEDURE — 36569 INSJ PICC 5 YR+ W/O IMAGING: CPT

## 2022-08-26 PROCEDURE — 86901 BLOOD TYPING SEROLOGIC RH(D): CPT

## 2022-08-26 PROCEDURE — 80048 BASIC METABOLIC PNL TOTAL CA: CPT

## 2022-08-26 PROCEDURE — 83605 ASSAY OF LACTIC ACID: CPT

## 2022-08-26 PROCEDURE — 82803 BLOOD GASES ANY COMBINATION: CPT

## 2022-08-26 PROCEDURE — 36430 TRANSFUSION BLD/BLD COMPNT: CPT

## 2022-08-26 PROCEDURE — 83540 ASSAY OF IRON: CPT

## 2022-08-26 PROCEDURE — 94002 VENT MGMT INPAT INIT DAY: CPT

## 2022-08-26 PROCEDURE — 86850 RBC ANTIBODY SCREEN: CPT

## 2022-08-26 PROCEDURE — 85610 PROTHROMBIN TIME: CPT

## 2022-08-26 PROCEDURE — 83735 ASSAY OF MAGNESIUM: CPT

## 2022-08-26 PROCEDURE — 87641 MR-STAPH DNA AMP PROBE: CPT

## 2022-08-26 PROCEDURE — 99285 EMERGENCY DEPT VISIT HI MDM: CPT | Mod: 25

## 2022-08-26 PROCEDURE — 87186 SC STD MICRODIL/AGAR DIL: CPT

## 2022-08-26 PROCEDURE — C1751: CPT

## 2022-08-26 PROCEDURE — 83880 ASSAY OF NATRIURETIC PEPTIDE: CPT

## 2022-08-26 PROCEDURE — 84145 PROCALCITONIN (PCT): CPT

## 2022-08-26 PROCEDURE — 0225U NFCT DS DNA&RNA 21 SARSCOV2: CPT

## 2022-08-26 PROCEDURE — 99233 SBSQ HOSP IP/OBS HIGH 50: CPT

## 2022-08-26 PROCEDURE — 94799 UNLISTED PULMONARY SVC/PX: CPT

## 2022-08-26 PROCEDURE — 86923 COMPATIBILITY TEST ELECTRIC: CPT

## 2022-08-26 PROCEDURE — 85730 THROMBOPLASTIN TIME PARTIAL: CPT

## 2022-08-26 PROCEDURE — 96374 THER/PROPH/DIAG INJ IV PUSH: CPT

## 2022-08-26 PROCEDURE — 87086 URINE CULTURE/COLONY COUNT: CPT

## 2022-08-26 PROCEDURE — 87635 SARS-COV-2 COVID-19 AMP PRB: CPT

## 2022-08-26 PROCEDURE — 82962 GLUCOSE BLOOD TEST: CPT

## 2022-08-26 PROCEDURE — P9016: CPT

## 2022-08-26 PROCEDURE — 81001 URINALYSIS AUTO W/SCOPE: CPT

## 2022-08-26 PROCEDURE — 94003 VENT MGMT INPAT SUBQ DAY: CPT

## 2022-08-26 PROCEDURE — 74176 CT ABD & PELVIS W/O CONTRAST: CPT

## 2022-08-26 PROCEDURE — 87640 STAPH A DNA AMP PROBE: CPT

## 2022-08-26 PROCEDURE — 86900 BLOOD TYPING SEROLOGIC ABO: CPT

## 2022-08-26 PROCEDURE — 74018 RADEX ABDOMEN 1 VIEW: CPT

## 2022-08-26 PROCEDURE — 85025 COMPLETE CBC W/AUTO DIFF WBC: CPT

## 2022-08-26 RX ORDER — SODIUM ZIRCONIUM CYCLOSILICATE 10 G/10G
10 POWDER, FOR SUSPENSION ORAL ONCE
Refills: 0 | Status: COMPLETED | OUTPATIENT
Start: 2022-08-26 | End: 2022-08-26

## 2022-08-26 RX ORDER — POLYETHYLENE GLYCOL 3350 17 G/17G
0 POWDER, FOR SOLUTION ORAL
Qty: 0 | Refills: 0 | DISCHARGE

## 2022-08-26 RX ADMIN — Medication 1 TABLET(S): at 11:33

## 2022-08-26 RX ADMIN — Medication 2 MILLIGRAM(S): at 11:32

## 2022-08-26 RX ADMIN — FENTANYL CITRATE 25 MICROGRAM(S): 50 INJECTION INTRAVENOUS at 06:23

## 2022-08-26 RX ADMIN — Medication 2 MILLIGRAM(S): at 17:55

## 2022-08-26 RX ADMIN — INSULIN GLARGINE 8 UNIT(S): 100 INJECTION, SOLUTION SUBCUTANEOUS at 05:08

## 2022-08-26 RX ADMIN — Medication 3 MILLILITER(S): at 02:28

## 2022-08-26 RX ADMIN — METHOCARBAMOL 500 MILLIGRAM(S): 500 TABLET, FILM COATED ORAL at 17:55

## 2022-08-26 RX ADMIN — Medication 1 TABLET(S): at 11:32

## 2022-08-26 RX ADMIN — CHLORHEXIDINE GLUCONATE 1 APPLICATION(S): 213 SOLUTION TOPICAL at 11:49

## 2022-08-26 RX ADMIN — NYSTATIN CREAM 1 APPLICATION(S): 100000 CREAM TOPICAL at 15:27

## 2022-08-26 RX ADMIN — PIPERACILLIN AND TAZOBACTAM 25 GRAM(S): 4; .5 INJECTION, POWDER, LYOPHILIZED, FOR SOLUTION INTRAVENOUS at 15:26

## 2022-08-26 RX ADMIN — Medication 20 MILLIGRAM(S): at 05:05

## 2022-08-26 RX ADMIN — CHLORHEXIDINE GLUCONATE 15 MILLILITER(S): 213 SOLUTION TOPICAL at 05:06

## 2022-08-26 RX ADMIN — Medication 2 TABLET(S): at 05:05

## 2022-08-26 RX ADMIN — CHLORHEXIDINE GLUCONATE 15 MILLILITER(S): 213 SOLUTION TOPICAL at 17:55

## 2022-08-26 RX ADMIN — FENTANYL CITRATE 25 MICROGRAM(S): 50 INJECTION INTRAVENOUS at 06:43

## 2022-08-26 RX ADMIN — PIPERACILLIN AND TAZOBACTAM 25 GRAM(S): 4; .5 INJECTION, POWDER, LYOPHILIZED, FOR SOLUTION INTRAVENOUS at 08:25

## 2022-08-26 RX ADMIN — Medication 3 MILLILITER(S): at 06:36

## 2022-08-26 RX ADMIN — METHOCARBAMOL 500 MILLIGRAM(S): 500 TABLET, FILM COATED ORAL at 05:05

## 2022-08-26 RX ADMIN — Medication 2 MILLIGRAM(S): at 05:05

## 2022-08-26 RX ADMIN — HEPARIN SODIUM 5000 UNIT(S): 5000 INJECTION INTRAVENOUS; SUBCUTANEOUS at 05:05

## 2022-08-26 RX ADMIN — SIMETHICONE 80 MILLIGRAM(S): 80 TABLET, CHEWABLE ORAL at 11:32

## 2022-08-26 RX ADMIN — Medication 1 APPLICATION(S): at 11:48

## 2022-08-26 RX ADMIN — SIMETHICONE 80 MILLIGRAM(S): 80 TABLET, CHEWABLE ORAL at 05:05

## 2022-08-26 RX ADMIN — NYSTATIN CREAM 1 APPLICATION(S): 100000 CREAM TOPICAL at 05:06

## 2022-08-26 RX ADMIN — HEPARIN SODIUM 5000 UNIT(S): 5000 INJECTION INTRAVENOUS; SUBCUTANEOUS at 17:56

## 2022-08-26 RX ADMIN — SIMETHICONE 80 MILLIGRAM(S): 80 TABLET, CHEWABLE ORAL at 17:55

## 2022-08-26 RX ADMIN — Medication 1 MILLIGRAM(S): at 11:32

## 2022-08-26 RX ADMIN — Medication 2 MILLIGRAM(S): at 02:21

## 2022-08-26 RX ADMIN — SODIUM ZIRCONIUM CYCLOSILICATE 10 GRAM(S): 10 POWDER, FOR SUSPENSION ORAL at 09:24

## 2022-08-26 RX ADMIN — SODIUM CHLORIDE 4 MILLILITER(S): 9 INJECTION INTRAMUSCULAR; INTRAVENOUS; SUBCUTANEOUS at 06:36

## 2022-08-26 RX ADMIN — PANTOPRAZOLE SODIUM 40 MILLIGRAM(S): 20 TABLET, DELAYED RELEASE ORAL at 11:33

## 2022-08-26 RX ADMIN — Medication 2: at 18:06

## 2022-08-26 RX ADMIN — Medication 3 MILLILITER(S): at 12:51

## 2022-08-26 NOTE — PROGRESS NOTE ADULT - ASSESSMENT
76F with dementia, COPD with chronic resp failure and is vent dependent, s/p PEG, hx of cardiac arrest, CHF, cor pulmonale, CVA, COVID-19 infxn, CKD, anemia, multiple admissions for respiratory distress (last admission from 6/1-6/9 diagnosed with PNA with parapneumonic pleural effusion s/p thoracentesis and Avycaz) who presents from [facility] for respiratory distress    am K pending  vs noted  on ABX  cm follow up reviewed    ID eval noted - emp ABX in progress  cardio eval noted  vent support  GOC documented   is the HCP  pt is full code  old records reviewed  [facility] supportive care in progress  Fentanyl PRN on order for pain - sedation management

## 2022-08-26 NOTE — DISCHARGE NOTE PROVIDER - DETAILS OF MALNUTRITION DIAGNOSIS/DIAGNOSES
This patient has been assessed with a concern for Malnutrition and was treated during this hospitalization for the following Nutrition diagnosis/diagnoses:     -  08/23/2022: Severe protein-calorie malnutrition

## 2022-08-26 NOTE — PROGRESS NOTE ADULT - REASON FOR ADMISSION
respiratory distress
respiratory failure and sepsis due to suspected PNA
respiratory distress

## 2022-08-26 NOTE — DISCHARGE NOTE PROVIDER - HOSPITAL COURSE
HPI:  ***Patient with dementia and is non-verbal and is chronically trach/vented.  Patient is from facility and therefore information is obtained from chart.***    76F with dementia, COPD with chronic resp failure and is vent dependent, s/p PEG, hx of cardiac arrest, CHF, cor pulmonale, CVA, COVID-19 infxn, CKD, anemia, multiple admissions for respiratory distress (last admission from 6/1-6/9 diagnosed with PNA with parapneumonic pleural effusion s/p thoracentesis and Avycaz) who presents from [facility] for respiratory distress again.  H transfer note only mention respiratory distress.  As per ED note, patient was noted to have increased dyspnea and worsening hypoxia.  No noted pain or reported fevers/chills.  In the ED, patient's triage vitals were /72  , RR 24, 100% on vent, T 99.7F.  Labs showed leukocytosis 25.06 (up from 6.85 on 6/10), anemia 9.8 (from 11.3), thrombocytosis 466 (from 264), hyperkalemia 5.7, lactic acid 3.7.  ABG was 7.48/34/168.  CT chest pending.  CXR showed possible consolidation on right lobe.  Patient started empirically on vancomycin and zosyn by ED.  Patient is being admitted for respiratory distress 2/2 PNA.  Currently patient is non-verbal and does not seem to be in any distress.   (18 Aug 2022 20:18)      77yo F with PMH of dementia, COPD with chronic resp failure and is vent dependent, s/p PEG, hx of cardiac arrest, diastolic CHF, cor pulmonale, hx of CVA, COVID-19 infxn, CKD, anemia, multiple admissions for respiratory distress (recent admission from 6/1-6/9 diagnosed with PNA with parapneumonic pleural effusion s/p thoracentesis and Avycaz) who presents from [facility] for respiratory distress again a/w sepsis and respiratory failure due to suspected recurrent gram-negative PNA.    Severe sepsis and acute hypoxic respiratory failure 2/2 gram-negative PNA   - continue current vent settings (on AC with RR 16, , PEEP 5, FiO2 100%) maintain O2 sat >92%  - was on ertapenem, as per ID changed to zosyn+bactrim for coverage of prior admissions cultures of MDR Serratia and CRE Pseudomonas  - lactate downtrending, procalcitonin high but improving with treatment (4.76 -> 3.34 -> 1.77)  - cautious use of fluids given hx of CHF (received 1750cc of NS in ED)  - f/u blood cultures (NGTD), urine culture pending   - f/u trach sputum culture - has GNR growing awaiting speciation / sensitivities  - has hx of respiratory distress driven by vent asynchrony and would require IV benzos ; trialed IV fentanyl with adequate effect this evening  - Checked CT Abd/P to eval for any other potential source sign of infection and found no significant sign of intra-abdominal infection on CT  - cc/pulm consult (Jaime), recs appreciated  - ID ([facility]), recs appreciated  - palliative care (Emre), recs appreciated - pt is full code    Diastolic CHF  B/L pleural effusions  - likely is a component of pulmonary edema given hx of CHF, given lasix 40mg IV x1 post-transfusion  - last TTE was 5/30 with EF 65% with normal LVSF, dilated RV, and moderate pHTN -> repeat TTE appears unchanged  - cardiology consult (Deepak), recs appreciated   No current plans for thoracentesis    PEG tube leak  - Nursing reported small amount of tube feed leak around the PEG tube entry site.   - Consulted GI (Coral), recs appreciated  - pt did have tube replaced on a recent admission    Anemia of chronic disease  - received 1 unit of pRBC on 8/19 for Hgb of 7.0 with appropriate response in Hgb -- HB stable  - has been evaluated by GI and hematology in the past -> dx with ACD with intermittent GI bleeding  - negative occult blood   GI recs appreciated      Hyperkalemia  - given both insulin and lasix   still remains elevated  nutrition consulted  improved after lokelma and switching to Nepro diet    Hx of CKD stage 3 - near baseline of 1.2-1.3  - renally dose medications and monitor BMP and electrolytes   nutrition consulted  changed TF to nepro    HTN  - not on any BP meds at facility  - monitor VS    DM2  - NPO with TF  - continue with insulin 8units qAM as per last admission  - c/w moderate insulin coverage scale  - goal -180    Diet  - changed diet to nepro 2/2 elevated K

## 2022-08-26 NOTE — PROGRESS NOTE ADULT - SUBJECTIVE AND OBJECTIVE BOX
BREA BECKHAM     [facility]    Allergies    codeine (Hives)    Intolerances        PAST MEDICAL & SURGICAL HISTORY:  Dementia of frontal lobe type      Aphasic stroke      Diabetes mellitus      Respiratory failure      Hypertension      GERD (gastroesophageal reflux disease)      Constipation      Respiratory failure      CVA (cerebral vascular accident)      HTN (hypertension)      DM (diabetes mellitus)      Advanced dementia      COVID-19 virus detected      Quadriplegia      Pneumonia      Type II diabetes mellitus      Hx of appendectomy      Gastrostomy in place      Tracheostomy in place      Tracheostomy tube present      Feeding by G-tube          FAMILY HISTORY:  No pertinent family history in first degree relatives        Home Medications:  albuterol 90 mcg/inh inhalation aerosol with adapter: 2  inhaled every 6 hours (21 May 2022 21:16)  Bacid (LAC) oral tablet: 2 tab(s) by gastrostomy tube once a day (21 May 2022 21:16)  Betadine 10% topical swab: cleanse right and left great toes (21 May 2022 21:16)  Carafate 1 g/10 mL oral suspension: 10 milliliter(s) by gastrostomy tube 4 times a day (before meals and at bedtime) for 14 days (Started 6/4/21) (21 May 2022 21:16)  chlorhexidine 0.12% mucous membrane liquid: 15 milliliter(s) mucous membrane 2 times a day (21 May 2022 21:16)  ertapenem 1 g injection: 1 gram(s) injectable once a day until 6/11 (10 Zay 2022 12:12)  Eucerin topical cream: Apply topically to affected area once a day bilateral feet (21 May 2022 21:16)  folic acid 1 mg oral tablet: 1 tab(s) orally once a day (21 May 2022 21:16)  furosemide 20 mg oral tablet: 1 tab(s) orally once a day (10 Zay 2022 12:12)  insulin glargine 100 units/mL subcutaneous solution: 8 unit(s) subcutaneous once a day (in the morning) (01 Jun 2022 08:24)  ipratropium-albuterol 0.5 mg-2.5 mg/3 mL inhalation solution: 3 milliliter(s) inhaled 4 times a day (21 May 2022 21:16)  LORazepam 1 mg oral tablet: 1 tab(s) by gastrostomy tube every 4 hours (21 May 2022 21:16)  methocarbamol 500 mg oral tablet: 1 tab(s) by gastrostomy tube 2 times a day (21 May 2022 21:16)  MiraLax oral powder for reconstitution:  (21 May 2022 23:14)  Multiple Vitamins oral tablet: 1 tab(s) orally once a day (21 May 2022 21:16)  nystatin 100,000 units/g topical powder: 1 application topically 3 times a day (29 May 2022 16:35)  omeprazole 20 mg oral delayed release capsule: orally 2 times a day (21 May 2022 23:14)  polyethylene glycol 3350 oral powder for reconstitution: 17 gram(s) by gastrostomy tube every 12 hours (21 May 2022 21:16)  senna 8.6 mg oral tablet: 3 tab(s) by gastrostomy tube once a day (at bedtime) (21 May 2022 21:16)  simethicone 80 mg oral tablet, chewable: 1 tab(s) by gastrostomy tube every 6 hours (21 May 2022 21:16)  Tylenol 325 mg oral tablet: 2 tab(s) by gastrostomy tube once a day; 60 minutes prior to dressing change  (21 May 2022 21:16)  Tylenol 325 mg oral tablet: 2 tab(s) by gastrostomy tube every 6 hours, As Needed (21 May 2022 21:16)      MEDICATIONS  (STANDING):  albuterol/ipratropium for Nebulization 3 milliLiter(s) Nebulizer every 6 hours  chlorhexidine 0.12% Liquid 15 milliLiter(s) Oral Mucosa every 12 hours  chlorhexidine 2% Cloths 1 Application(s) Topical daily  collagenase Ointment 1 Application(s) Topical daily  dextrose 5%. 1000 milliLiter(s) (100 mL/Hr) IV Continuous <Continuous>  dextrose 5%. 1000 milliLiter(s) (50 mL/Hr) IV Continuous <Continuous>  dextrose 50% Injectable 25 Gram(s) IV Push once  dextrose 50% Injectable 12.5 Gram(s) IV Push once  dextrose 50% Injectable 25 Gram(s) IV Push once  folic acid 1 milliGRAM(s) Oral daily  furosemide    Tablet 20 milliGRAM(s) Oral daily  glucagon  Injectable 1 milliGRAM(s) IntraMuscular once  heparin   Injectable 5000 Unit(s) SubCutaneous every 12 hours  insulin glargine Injectable (LANTUS) 8 Unit(s) SubCutaneous every morning  insulin lispro (ADMELOG) corrective regimen sliding scale   SubCutaneous every 6 hours  lactobacillus acidophilus 1 Tablet(s) Oral daily  LORazepam     Tablet 2 milliGRAM(s) Oral every 4 hours  methocarbamol 500 milliGRAM(s) Oral two times a day  multivitamin 1 Tablet(s) Oral daily  nystatin Powder 1 Application(s) Topical three times a day  pantoprazole  Injectable 40 milliGRAM(s) IV Push daily  piperacillin/tazobactam IVPB.. 3.375 Gram(s) IV Intermittent every 8 hours  polyethylene glycol 3350 17 Gram(s) Oral every 12 hours  povidone iodine 10% Solution 1 Application(s) Topical daily  senna 3 Tablet(s) Oral at bedtime  simethicone 80 milliGRAM(s) Chew every 6 hours  sodium chloride 0.9%. 1000 milliLiter(s) (75 mL/Hr) IV Continuous <Continuous>  sodium chloride 7% Inhalation 4 milliLiter(s) Inhalation every 12 hours  trimethoprim  160 mG/sulfamethoxazole 800 mG 2 Tablet(s) Oral two times a day    MEDICATIONS  (PRN):  acetaminophen     Tablet .. 650 milliGRAM(s) Oral every 6 hours PRN Temp greater or equal to 38C (100.4F), Mild Pain (1 - 3)  aluminum hydroxide/magnesium hydroxide/simethicone Suspension 30 milliLiter(s) Oral every 4 hours PRN Dyspepsia  dextrose Oral Gel 15 Gram(s) Oral once PRN Blood Glucose LESS THAN 70 milliGRAM(s)/deciliter  fentaNYL    Injectable 25 MICROGram(s) IV Push every 4 hours PRN Moderate Pain (4 - 6), agitation  melatonin 3 milliGRAM(s) Oral at bedtime PRN Insomnia  ondansetron Injectable 4 milliGRAM(s) IV Push every 8 hours PRN Nausea and/or Vomiting  sodium chloride 0.9% lock flush 10 milliLiter(s) IV Push every 1 hour PRN Pre/post blood products, medications, blood draw, and to maintain line patency      Diet, NPO with Tube Feed:   Tube Feeding Modality: Gastrostomy  Nepro with Carb Steady  Total Volume for 24 Hours (mL): 800  Continuous  Starting Tube Feed Rate mL per Hour: 20  Increase Tube Feed Rate by (mL): 10     Every 4 hours  Until Goal Tube Feed Rate (mL per Hour): 40  Tube Feed Duration (in Hours): 20  Tube Feed Start Time: 00:00 (08-25-22 @ 16:40) [Pending Verification By Attending]  Diet, NPO with Tube Feed:   Tube Feeding Modality: Gastrostomy  Glucerna 1.5 Horacio  Total Volume for 24 Hours (mL): 900  Continuous  Starting Tube Feed Rate mL per Hour: 45  Until Goal Tube Feed Rate (mL per Hour): 45  Tube Feed Duration (in Hours): 20  Tube Feed Start Time: 00:00  Free Water Flush  Bolus   Total Volume per Flush (mL): 200   Frequency: Every 6 Hours   Total Daily Volume of Flush (mL): 800    Start Time: 00:00 (08-19-22 @ 13:25) [Active]          Vital Signs Last 24 Hrs  T(C): 36.8 (26 Aug 2022 04:00), Max: 36.9 (25 Aug 2022 12:36)  T(F): 98.3 (26 Aug 2022 04:00), Max: 98.4 (25 Aug 2022 12:36)  HR: 77 (26 Aug 2022 06:52) (64 - 83)  BP: 110/56 (26 Aug 2022 06:00) (100/52 - 122/66)  BP(mean): 74 (26 Aug 2022 06:00) (68 - 88)  RR: 23 (26 Aug 2022 06:00) (13 - 27)  SpO2: 97% (26 Aug 2022 06:52) (95% - 100%)    Parameters below as of 26 Aug 2022 06:52  Patient On (Oxygen Delivery Method): ventilator,.40          08-25-22 @ 07:01  -  08-26-22 @ 07:00  --------------------------------------------------------  IN: 1800 mL / OUT: 1600 mL / NET: 200 mL        Mode: AC/ CMV (Assist Control/ Continuous Mandatory Ventilation), RR (machine): 16, TV (machine): 16, FiO2: 40, PEEP: 5, ITime: 1, MAP: 14, PIP: 32      LABS:                        8.4    8.89  )-----------( 333      ( 26 Aug 2022 06:00 )             27.8     08-26    136  |  104  |  25<H>  ----------------------------<  104<H>  5.4<H>   |  27  |  1.43<H>    Ca    8.7      26 Aug 2022 06:00  Phos  4.5     08-26  Mg     2.4     08-26    TPro  7.2  /  Alb  2.0<L>  /  TBili  0.3  /  DBili  x   /  AST  15  /  ALT  16  /  AlkPhos  94  08-26              WBC:  WBC Count: 8.89 K/uL (08-26 @ 06:00)  WBC Count: 9.25 K/uL (08-25 @ 06:00)  WBC Count: 7.31 K/uL (08-24 @ 19:41)  WBC Count: 10.48 K/uL (08-23 @ 06:00)      MICROBIOLOGY:  RECENT CULTURES:  08-19 ET Tube ET Tube Pseudomonas aeruginosa (Carbapenem Resistant)  Stenotrophomonas maltophilia   Few polymorphonuclear leukocytes per low power field  Rare Squamous epithelial cells per low power field  Few Gram Negative Rods per oil power field   Few Pseudomonas aeruginosa (Carbapenem Resistant)  Moderate Stenotrophomonas maltophilia  Normal Respiratory Elvira present                    Sodium:  Sodium, Serum: 136 mmol/L (08-26 @ 06:00)  Sodium, Serum: 136 mmol/L (08-26 @ 00:05)  Sodium, Serum: 133 mmol/L (08-25 @ 06:00)  Sodium, Serum: 132 mmol/L (08-24 @ 19:41)  Sodium, Serum: 135 mmol/L (08-23 @ 06:00)      1.43 mg/dL 08-26 @ 06:00  1.41 mg/dL 08-26 @ 00:05  1.53 mg/dL 08-25 @ 06:00  1.41 mg/dL 08-24 @ 19:41  1.32 mg/dL 08-23 @ 06:00      Hemoglobin:  Hemoglobin: 8.4 g/dL (08-26 @ 06:00)  Hemoglobin: 8.7 g/dL (08-25 @ 06:00)  Hemoglobin: 7.9 g/dL (08-24 @ 19:41)  Hemoglobin: 8.9 g/dL (08-23 @ 06:00)      Platelets: Platelet Count - Automated: 333 K/uL (08-26 @ 06:00)  Platelet Count - Automated: 364 K/uL (08-25 @ 06:00)  Platelet Count - Automated: 321 K/uL (08-24 @ 19:41)  Platelet Count - Automated: 391 K/uL (08-23 @ 06:00)      LIVER FUNCTIONS - ( 26 Aug 2022 06:00 )  Alb: 2.0 g/dL / Pro: 7.2 g/dL / ALK PHOS: 94 U/L / ALT: 16 U/L DA / AST: 15 U/L / GGT: x                 RADIOLOGY & ADDITIONAL STUDIES:      MICROBIOLOGY:  RECENT CULTURES:  08-19 ET Tube ET Tube Pseudomonas aeruginosa (Carbapenem Resistant)  Stenotrophomonas maltophilia   Few polymorphonuclear leukocytes per low power field  Rare Squamous epithelial cells per low power field  Few Gram Negative Rods per oil power field   Few Pseudomonas aeruginosa (Carbapenem Resistant)  Moderate Stenotrophomonas maltophilia  Normal Respiratory Elvira present

## 2022-08-26 NOTE — DISCHARGE NOTE PROVIDER - CARE PROVIDER_API CALL
Paul Merrill  INTERNAL MEDICINE  [address] [place] [address], [address]  [place] [address]  Phone: [phone]  Fax: [phone]  Follow Up Time:

## 2022-08-26 NOTE — DISCHARGE NOTE NURSING/CASE MANAGEMENT/SOCIAL WORK - NSDCPEFALRISK_GEN_ALL_CORE
For information on Fall & Injury Prevention, visit: [email or web address] OR  [email or web address] OR  https://www.cdc.gov/steadi/patient.html

## 2022-08-26 NOTE — DISCHARGE NOTE PROVIDER - NSDCCPCAREPLAN_GEN_ALL_CORE_FT
PRINCIPAL DISCHARGE DIAGNOSIS  Diagnosis: Acute respiratory failure with hypoxia  Assessment and Plan of Treatment: likely from pneumonia, treated with IV abx, completed course, WBC improved, afebrile.

## 2022-08-26 NOTE — DISCHARGE NOTE NURSING/CASE MANAGEMENT/SOCIAL WORK - PATIENT PORTAL LINK FT
You can access the FollowMyHealth Patient Portal offered by [facility] by registering at the following website: [email or web address]. By joining [employer]’s FollowMyHealth portal, you will also be able to view your health information using other applications (apps) compatible with our system.

## 2022-08-26 NOTE — DISCHARGE NOTE NURSING/CASE MANAGEMENT/SOCIAL WORK - NSDCVIVACCINE_GEN_ALL_CORE_FT
COVID-19, mRNA, LNP-S, PF, 30 mcg/0.3 mL dose (Pfizer); 20-Dec-2021 16:02; Cat Ramirez); [employer]; [id number] (Exp. Date: 30-Dec-2021); IntraMuscular; Deltoid Left.; 0.3 milliLiter(s);

## 2022-08-26 NOTE — DISCHARGE NOTE PROVIDER - ATTENDING DISCHARGE PHYSICAL EXAMINATION:
Vital Signs Last 24 Hrs  T(C): 36.8 (26 Aug 2022 04:00), Max: 36.9 (25 Aug 2022 12:36)  T(F): 98.3 (26 Aug 2022 04:00), Max: 98.4 (25 Aug 2022 12:36)  HR: 69 (26 Aug 2022 08:00) (64 - 83)  BP: 108/56 (26 Aug 2022 08:00) (100/52 - 122/66)  BP(mean): 72 (26 Aug 2022 08:00) (68 - 88)  RR: 20 (26 Aug 2022 08:00) (13 - 27)  SpO2: 100% (26 Aug 2022 08:00) (95% - 100%)    Parameters below as of 26 Aug 2022 08:00  Patient On (Oxygen Delivery Method): ventilator    GENERAL: chronically ill-appearing  HEENT:  anicteric, dry mucous membranes ; trach/vent  CHEST/LUNG:  decreased breath sounds b/l   HEART:  RRR, S1, S2  ABDOMEN:  BS+, soft, no apparent tenderness, distended; G-tube in place with some maceration at entry point  EXTREMITIES: no cyanosis or apparent calf tenderness  NERVOUS SYSTEM: nonverbal, does not follow commands

## 2022-08-26 NOTE — PROGRESS NOTE ADULT - SUBJECTIVE AND OBJECTIVE BOX
Date/Time Patient Seen:  		  Referring MD:   Data Reviewed	       Patient is a 76y old  Female who presents with a chief complaint of respiratory distress (25 Aug 2022 22:11)      Subjective/HPI     PAST MEDICAL & SURGICAL HISTORY:  Dementia of frontal lobe type    Aphasic stroke    Diabetes mellitus    Respiratory failure    Hypertension    GERD (gastroesophageal reflux disease)    Constipation    Respiratory failure    CVA (cerebral vascular accident)    HTN (hypertension)    DM (diabetes mellitus)    Advanced dementia    COVID-19 virus detected    Quadriplegia    Pneumonia    Type II diabetes mellitus    Hx of appendectomy    Gastrostomy in place    Tracheostomy in place    Tracheostomy tube present    Feeding by G-tube          Medication list         MEDICATIONS  (STANDING):  albuterol/ipratropium for Nebulization 3 milliLiter(s) Nebulizer every 6 hours  chlorhexidine 0.12% Liquid 15 milliLiter(s) Oral Mucosa every 12 hours  chlorhexidine 2% Cloths 1 Application(s) Topical daily  collagenase Ointment 1 Application(s) Topical daily  dextrose 5%. 1000 milliLiter(s) (50 mL/Hr) IV Continuous <Continuous>  dextrose 5%. 1000 milliLiter(s) (100 mL/Hr) IV Continuous <Continuous>  dextrose 50% Injectable 25 Gram(s) IV Push once  dextrose 50% Injectable 12.5 Gram(s) IV Push once  dextrose 50% Injectable 25 Gram(s) IV Push once  folic acid 1 milliGRAM(s) Oral daily  furosemide    Tablet 20 milliGRAM(s) Oral daily  glucagon  Injectable 1 milliGRAM(s) IntraMuscular once  heparin   Injectable 5000 Unit(s) SubCutaneous every 12 hours  insulin glargine Injectable (LANTUS) 8 Unit(s) SubCutaneous every morning  insulin lispro (ADMELOG) corrective regimen sliding scale   SubCutaneous every 6 hours  lactobacillus acidophilus 1 Tablet(s) Oral daily  LORazepam     Tablet 2 milliGRAM(s) Oral every 4 hours  methocarbamol 500 milliGRAM(s) Oral two times a day  multivitamin 1 Tablet(s) Oral daily  nystatin Powder 1 Application(s) Topical three times a day  pantoprazole  Injectable 40 milliGRAM(s) IV Push daily  piperacillin/tazobactam IVPB.. 3.375 Gram(s) IV Intermittent every 8 hours  polyethylene glycol 3350 17 Gram(s) Oral every 12 hours  povidone iodine 10% Solution 1 Application(s) Topical daily  senna 3 Tablet(s) Oral at bedtime  simethicone 80 milliGRAM(s) Chew every 6 hours  sodium chloride 0.9%. 1000 milliLiter(s) (75 mL/Hr) IV Continuous <Continuous>  sodium chloride 7% Inhalation 4 milliLiter(s) Inhalation every 12 hours  trimethoprim  160 mG/sulfamethoxazole 800 mG 2 Tablet(s) Oral two times a day    MEDICATIONS  (PRN):  acetaminophen     Tablet .. 650 milliGRAM(s) Oral every 6 hours PRN Temp greater or equal to 38C (100.4F), Mild Pain (1 - 3)  aluminum hydroxide/magnesium hydroxide/simethicone Suspension 30 milliLiter(s) Oral every 4 hours PRN Dyspepsia  dextrose Oral Gel 15 Gram(s) Oral once PRN Blood Glucose LESS THAN 70 milliGRAM(s)/deciliter  fentaNYL    Injectable 25 MICROGram(s) IV Push every 4 hours PRN Moderate Pain (4 - 6), agitation  melatonin 3 milliGRAM(s) Oral at bedtime PRN Insomnia  ondansetron Injectable 4 milliGRAM(s) IV Push every 8 hours PRN Nausea and/or Vomiting  sodium chloride 0.9% lock flush 10 milliLiter(s) IV Push every 1 hour PRN Pre/post blood products, medications, blood draw, and to maintain line patency         Vitals log        ICU Vital Signs Last 24 Hrs  T(C): 36.8 (26 Aug 2022 04:00), Max: 36.9 (25 Aug 2022 12:36)  T(F): 98.3 (26 Aug 2022 04:00), Max: 98.4 (25 Aug 2022 12:36)  HR: 77 (26 Aug 2022 06:52) (64 - 83)  BP: 110/56 (26 Aug 2022 06:00) (100/52 - 122/66)  BP(mean): 74 (26 Aug 2022 06:00) (68 - 88)  ABP: --  ABP(mean): --  RR: 23 (26 Aug 2022 06:00) (13 - 27)  SpO2: 97% (26 Aug 2022 06:52) (95% - 100%)    O2 Parameters below as of 26 Aug 2022 06:52  Patient On (Oxygen Delivery Method): ventilator,.40             Mode: AC/ CMV (Assist Control/ Continuous Mandatory Ventilation)  RR (machine): 16  TV (machine): 16  FiO2: 40  PEEP: 5  ITime: 1  MAP: 14  PIP: 32      Input and Output:  I&O's Detail    24 Aug 2022 07:01  -  25 Aug 2022 07:00  --------------------------------------------------------  IN:    Free Water: 400 mL    Glucerna: 1080 mL    IV PiggyBack: 100 mL  Total IN: 1580 mL    OUT:    Indwelling Catheter - Urethral (mL): 2000 mL    Voided (mL): 900 mL  Total OUT: 2900 mL    Total NET: -1320 mL      25 Aug 2022 07:01  -  26 Aug 2022 06:57  --------------------------------------------------------  IN:    Free Water: 400 mL    Glucerna: 450 mL    IV PiggyBack: 200 mL    sodium chloride 0.9%: 750 mL  Total IN: 1800 mL    OUT:    Indwelling Catheter - Urethral (mL): 500 mL    Voided (mL): 1100 mL  Total OUT: 1600 mL    Total NET: 200 mL          Lab Data                        8.4    8.89  )-----------( 333      ( 26 Aug 2022 06:00 )             27.8     08-26    136  |  103  |  27<H>  ----------------------------<  133<H>  5.3   |  28  |  1.41<H>    Ca    8.5      26 Aug 2022 00:05  Phos  4.4     08-26  Mg     2.3     08-26    TPro  7.5  /  Alb  2.1<L>  /  TBili  0.2  /  DBili  x   /  AST  16  /  ALT  17  /  AlkPhos  110  08-25            Review of Systems	      Objective     Physical Examination    heart s1s2  lung dc bS  abd soft  head nc      Pertinent Lab findings & Imaging      Annalise:  NO   Adequate UO     I&O's Detail    24 Aug 2022 07:01  -  25 Aug 2022 07:00  --------------------------------------------------------  IN:    Free Water: 400 mL    Glucerna: 1080 mL    IV PiggyBack: 100 mL  Total IN: 1580 mL    OUT:    Indwelling Catheter - Urethral (mL): 2000 mL    Voided (mL): 900 mL  Total OUT: 2900 mL    Total NET: -1320 mL      25 Aug 2022 07:01  -  26 Aug 2022 06:57  --------------------------------------------------------  IN:    Free Water: 400 mL    Glucerna: 450 mL    IV PiggyBack: 200 mL    sodium chloride 0.9%: 750 mL  Total IN: 1800 mL    OUT:    Indwelling Catheter - Urethral (mL): 500 mL    Voided (mL): 1100 mL  Total OUT: 1600 mL    Total NET: 200 mL               Discussed with:     Cultures:	        Radiology

## 2022-08-26 NOTE — PROGRESS NOTE ADULT - SUBJECTIVE AND OBJECTIVE BOX
INTERVAL HPI/OVERNIGHT EVENTS:  still distended tolerating po via tube    Allergies    codeine (Hives)    Intolerances    General:  No wt loss, fevers, chills, night sweats, fatigue,   Eyes:  Good vision, no reported pain  ENT:  No sore throat, pain, runny nose, dysphagia  CV:  No pain, palpitations, hypo/hypertension  Resp:  No dyspnea, cough, tachypnea, wheezing  GI:  No pain, No nausea, No vomiting, No diarrhea, No constipation, No weight loss, No fever, No pruritis, No rectal bleeding, No tarry stools, No dysphagia,  :  No pain, bleeding, incontinence, nocturia  Muscle:  No pain, weakness  Neuro:  No weakness, tingling, memory problems  Psych:  No fatigue, insomnia, mood problems, depression  Endocrine:  No polyuria, polydipsia, cold/heat intolerance  Heme:  No petechiae, ecchymosis, easy bruisability  Skin:  No rash, tattoos, scars, edema      PHYSICAL EXAM:   Vital Signs:  Vital Signs Last 24 Hrs  T(C): 36.8 (26 Aug 2022 04:00), Max: 36.9 (25 Aug 2022 12:36)  T(F): 98.3 (26 Aug 2022 04:00), Max: 98.4 (25 Aug 2022 12:36)  HR: 77 (26 Aug 2022 06:52) (64 - 83)  BP: 110/56 (26 Aug 2022 06:00) (100/52 - 122/66)  BP(mean): 74 (26 Aug 2022 06:00) (68 - 88)  RR: 23 (26 Aug 2022 06:00) (13 - 27)  SpO2: 97% (26 Aug 2022 06:52) (95% - 100%)    Parameters below as of 26 Aug 2022 06:52  Patient On (Oxygen Delivery Method): ventilator,.40      Daily     Daily Weight in k.3 (26 Aug 2022 06:30)I&O's Summary    25 Aug 2022 07:01  -  26 Aug 2022 07:00  --------------------------------------------------------  IN: 1800 mL / OUT: 1600 mL / NET: 200 mL        GENERAL:  Appears stated age, well-groomed, well-nourished, no distress  HEENT:  NC/AT,  conjunctivae clear and pink, no thyromegaly, nodules, adenopathy, no JVD, sclera -anicteric  CHEST:  Full & symmetric excursion, no increased effort, breath sounds clear  HEART:  Regular rhythm, S1, S2, no murmur/rub/S3/S4, no abdominal bruit, no edema  ABDOMEN:  Soft, non-tender, non-distended, normoactive bowel sounds,  no masses ,no hepato-splenomegaly, no signs of chronic liver disease  EXTEREMITIES:  no cyanosis,clubbing or edema  SKIN:  No rash/erythema/ecchymoses/petechiae/wounds/abscess/warm/dry  NEURO:  Alert, oriented, no asterixis, no tremor, no encephalopathy      LABS:                        8.4    8.89  )-----------( 333      ( 26 Aug 2022 06:00 )             27.8     08    136  |  104  |  25<H>  ----------------------------<  104<H>  5.4<H>   |  27  |  1.43<H>    Ca    8.7      26 Aug 2022 06:00  Phos  4.5       Mg     2.4         TPro  7.2  /  Alb  2.0<L>  /  TBili  0.3  /  DBili  x   /  AST  15  /  ALT  16  /  AlkPhos  94          amylase   lipase  RADIOLOGY & ADDITIONAL TESTS:

## 2022-08-26 NOTE — DISCHARGE NOTE PROVIDER - INSTRUCTIONS
Diet, NPO with Tube Feed:   Tube Feeding Modality: Gastrostomy  Nepro with Carb Steady  Total Volume for 24 Hours (mL): 800  Continuous  Starting Tube Feed Rate {mL per Hour}: 20  Increase Tube Feed Rate by (mL): 10     Every 4 hours  Until Goal Tube Feed Rate (mL per Hour): 40  Tube Feed Duration (in Hours): 20  Tube Feed Start Time: 00:00 (08-25-22 @ 16:40) [Active]

## 2022-08-26 NOTE — DISCHARGE NOTE PROVIDER - NSDCMRMEDTOKEN_GEN_ALL_CORE_FT
albuterol 90 mcg/inh inhalation aerosol with adapter: 2  inhaled every 6 hours  Bacid (LAC) oral tablet: 2 tab(s) by gastrostomy tube once a day  Betadine 10% topical swab: cleanse right and left great toes  Carafate 1 g/10 mL oral suspension: 10 milliliter(s) by gastrostomy tube 4 times a day (before meals and at bedtime) for 14 days (Started 6/4/21)  chlorhexidine 0.12% mucous membrane liquid: 15 milliliter(s) mucous membrane 2 times a day  Eucerin topical cream: Apply topically to affected area once a day bilateral feet  folic acid 1 mg oral tablet: 1 tab(s) orally once a day  furosemide 20 mg oral tablet: 1 tab(s) orally once a day  insulin glargine 100 units/mL subcutaneous solution: 8 unit(s) subcutaneous once a day (in the morning)  ipratropium-albuterol 0.5 mg-2.5 mg/3 mL inhalation solution: 3 milliliter(s) inhaled 4 times a day  LORazepam 1 mg oral tablet: 1 tab(s) by gastrostomy tube every 4 hours  methocarbamol 500 mg oral tablet: 1 tab(s) by gastrostomy tube 2 times a day  Multiple Vitamins oral tablet: 1 tab(s) orally once a day  nystatin 100,000 units/g topical powder: 1 application topically 3 times a day  omeprazole 20 mg oral delayed release capsule: orally 2 times a day  polyethylene glycol 3350 oral powder for reconstitution: 17 gram(s) by gastrostomy tube every 12 hours  senna 8.6 mg oral tablet: 3 tab(s) by gastrostomy tube once a day (at bedtime)  simethicone 80 mg oral tablet, chewable: 1 tab(s) by gastrostomy tube every 6 hours  Tylenol 325 mg oral tablet: 2 tab(s) by gastrostomy tube once a day; 60 minutes prior to dressing change   Tylenol 325 mg oral tablet: 2 tab(s) by gastrostomy tube every 6 hours, As Needed

## 2022-08-26 NOTE — PROGRESS NOTE ADULT - SUBJECTIVE AND OBJECTIVE BOX
Chief Complaint: Respiratory distress    Interval Events: No events overnight.    Review of Systems:  Unable to obtain    Physical Exam:  Vital Signs Last 24 Hrs  T(C): 36.8 (26 Aug 2022 04:00), Max: 36.9 (25 Aug 2022 12:36)  T(F): 98.3 (26 Aug 2022 04:00), Max: 98.4 (25 Aug 2022 12:36)  HR: 69 (26 Aug 2022 08:00) (64 - 83)  BP: 108/56 (26 Aug 2022 08:00) (100/52 - 122/66)  BP(mean): 72 (26 Aug 2022 08:00) (68 - 88)  RR: 20 (26 Aug 2022 08:00) (13 - 27)  SpO2: 100% (26 Aug 2022 08:00) (95% - 100%)  Parameters below as of 26 Aug 2022 08:00  Patient On (Oxygen Delivery Method): ventilator  General: Unresponsive  HEENT: Intubated  Neck: No JVD, no carotid bruit  Lungs: CTAB  CV: RRR, nl S1/S2, no M/R/G  Abdomen: S/NT/ND, +BS  Extremities: No LE edema, no cyanosis  Neuro: AAOx0  Skin: No rash    Labs:             08-26    136  |  104  |  25<H>  ----------------------------<  104<H>  5.4<H>   |  27  |  1.43<H>    Ca    8.7      26 Aug 2022 06:00  Phos  4.5     08-26  Mg     2.4     08-26    TPro  7.2  /  Alb  2.0<L>  /  TBili  0.3  /  DBili  x   /  AST  15  /  ALT  16  /  AlkPhos  94  08-26                        8.4    8.89  )-----------( 333      ( 26 Aug 2022 06:00 )             27.8     Telemetry: Sinus rhythm Chief Complaint: Respiratory distress    Interval Events: No events overnight.    Review of Systems:  Unable to obtain    Physical Exam:  Vital Signs Last 24 Hrs  T(C): 36.8 (26 Aug 2022 04:00), Max: 36.9 (25 Aug 2022 12:36)  T(F): 98.3 (26 Aug 2022 04:00), Max: 98.4 (25 Aug 2022 12:36)  HR: 69 (26 Aug 2022 08:00) (64 - 83)  BP: 108/56 (26 Aug 2022 08:00) (100/52 - 122/66)  BP(mean): 72 (26 Aug 2022 08:00) (68 - 88)  RR: 20 (26 Aug 2022 08:00) (13 - 27)  SpO2: 100% (26 Aug 2022 08:00) (95% - 100%)  Parameters below as of 26 Aug 2022 08:00  Patient On (Oxygen Delivery Method): ventilator  General: Unresponsive  HEENT: Trach  Neck: No JVD, no carotid bruit  Lungs: CTAB  CV: RRR, nl S1/S2, no M/R/G  Abdomen: S/NT/ND, +BS  Extremities: No LE edema, no cyanosis  Neuro: AAOx0  Skin: No rash    Labs:             08-26    136  |  104  |  25<H>  ----------------------------<  104<H>  5.4<H>   |  27  |  1.43<H>    Ca    8.7      26 Aug 2022 06:00  Phos  4.5     08-26  Mg     2.4     08-26    TPro  7.2  /  Alb  2.0<L>  /  TBili  0.3  /  DBili  x   /  AST  15  /  ALT  16  /  AlkPhos  94  08-26                        8.4    8.89  )-----------( 333      ( 26 Aug 2022 06:00 )             27.8     Telemetry: Sinus rhythm

## 2022-08-26 NOTE — CHART NOTE - NSCHARTNOTEFT_GEN_A_CORE
peg changed at bedside returned gastric contents  no need for tube study pt can be discharge to snf    Advanced care planning was discussed with patient and family.  Advanced care planning forms were reviewed and discussed.  Risks, benefits and alternatives of gastroenterologic procedures were discussed in detail and all questions were answered.    30 minutes spent.

## 2022-08-26 NOTE — PROGRESS NOTE ADULT - SUBJECTIVE AND OBJECTIVE BOX
[facility], Division of Infectious Diseases  MOIZ Lora Y. Patel, S. Shah, G. [facility]  [phone]    Name: BREA BEKCHAM  Age: 76y  Gender: Female  MRN: [id number]    Interval History:  Patient seen and examined at bedside  No acute overnight events. Afebrile    Notes reviewed    Antibiotics:  piperacillin/tazobactam IVPB.. 3.375 Gram(s) IV Intermittent every 8 hours      Medications:  acetaminophen     Tablet .. 650 milliGRAM(s) Oral every 6 hours PRN  albuterol/ipratropium for Nebulization 3 milliLiter(s) Nebulizer every 6 hours  aluminum hydroxide/magnesium hydroxide/simethicone Suspension 30 milliLiter(s) Oral every 4 hours PRN  chlorhexidine 0.12% Liquid 15 milliLiter(s) Oral Mucosa every 12 hours  chlorhexidine 2% Cloths 1 Application(s) Topical daily  collagenase Ointment 1 Application(s) Topical daily  dextrose 5%. 1000 milliLiter(s) IV Continuous <Continuous>  dextrose 5%. 1000 milliLiter(s) IV Continuous <Continuous>  dextrose 50% Injectable 25 Gram(s) IV Push once  dextrose 50% Injectable 12.5 Gram(s) IV Push once  dextrose 50% Injectable 25 Gram(s) IV Push once  dextrose Oral Gel 15 Gram(s) Oral once PRN  fentaNYL    Injectable 25 MICROGram(s) IV Push every 4 hours PRN  folic acid 1 milliGRAM(s) Oral daily  furosemide    Tablet 20 milliGRAM(s) Oral daily  glucagon  Injectable 1 milliGRAM(s) IntraMuscular once  heparin   Injectable 5000 Unit(s) SubCutaneous every 12 hours  insulin glargine Injectable (LANTUS) 8 Unit(s) SubCutaneous every morning  insulin lispro (ADMELOG) corrective regimen sliding scale   SubCutaneous every 6 hours  lactobacillus acidophilus 1 Tablet(s) Oral daily  LORazepam     Tablet 2 milliGRAM(s) Oral every 4 hours  melatonin 3 milliGRAM(s) Oral at bedtime PRN  methocarbamol 500 milliGRAM(s) Oral two times a day  multivitamin 1 Tablet(s) Oral daily  nystatin Powder 1 Application(s) Topical three times a day  ondansetron Injectable 4 milliGRAM(s) IV Push every 8 hours PRN  pantoprazole  Injectable 40 milliGRAM(s) IV Push daily  piperacillin/tazobactam IVPB.. 3.375 Gram(s) IV Intermittent every 8 hours  polyethylene glycol 3350 17 Gram(s) Oral every 12 hours  povidone iodine 10% Solution 1 Application(s) Topical daily  senna 3 Tablet(s) Oral at bedtime  simethicone 80 milliGRAM(s) Chew every 6 hours  sodium chloride 0.9% lock flush 10 milliLiter(s) IV Push every 1 hour PRN  sodium chloride 0.9%. 1000 milliLiter(s) IV Continuous <Continuous>  sodium chloride 7% Inhalation 4 milliLiter(s) Inhalation every 12 hours      Review of Systems:  unable to obtain  Allergies: codeine (Hives)    For details regarding the patient's past medical history, social history, family history, and other miscellaneous elements, please refer the initial infectious diseases consultation and/or the admitting history and physical examination for this admission.    Objective:  Vitals:   T(C): 36.8 (08-26-22 @ 04:00), Max: 36.9 (08-25-22 @ 12:36)  HR: 69 (08-26-22 @ 08:00) (64 - 83)  BP: 108/56 (08-26-22 @ 08:00) (100/52 - 122/66)  RR: 20 (08-26-22 @ 08:00) (13 - 27)  SpO2: 100% (08-26-22 @ 08:00) (95% - 100%)    Physical Examination:  General: no acute distress  HEENT: NC/AT  Neck: trach  Cardio: S1, S2 heard, RRR, no murmurs  Resp: MV breath sounds  Abd: soft, NT, ND  Ext: no edema or cyanosis  Skin: warm, dry, no visible rash      Laboratory Studies:  CBC:                       8.4    8.89  )-----------( 333      ( 26 Aug 2022 06:00 )             27.8     CMP: 08-26    136  |  104  |  25<H>  ----------------------------<  104<H>  5.4<H>   |  27  |  1.43<H>    Ca    8.7      26 Aug 2022 06:00  Phos  4.5     08-26  Mg     2.4     08-26    TPro  7.2  /  Alb  2.0<L>  /  TBili  0.3  /  DBili  x   /  AST  15  /  ALT  16  /  AlkPhos  94  08-26    LIVER FUNCTIONS - ( 26 Aug 2022 06:00 )  Alb: 2.0 g/dL / Pro: 7.2 g/dL / ALK PHOS: 94 U/L / ALT: 16 U/L DA / AST: 15 U/L / GGT: x               Microbiology: reviewed    Culture - Sputum (collected 08-19-22 @ 20:45)  Source: ET Tube ET Tube  Gram Stain (08-20-22 @ 03:35):    Few polymorphonuclear leukocytes per low power field    Rare Squamous epithelial cells per low power field    Few Gram Negative Rods per oil power field  Final Report (08-22-22 @ 17:12):    Few Pseudomonas aeruginosa (Carbapenem Resistant)    Moderate Stenotrophomonas maltophilia    Normal Respiratory Elvira present  Organism: Pseudomonas aeruginosa (Carbapenem Resistant)  Stenotrophomonas maltophilia (08-22-22 @ 17:12)  Organism: Stenotrophomonas maltophilia (08-22-22 @ 17:12)      -  Levofloxacin: S <=0.5      -  Trimethoprim/Sulfamethoxazole: S <=0.5/9.5      Method Type: PRATIK  Organism: Pseudomonas aeruginosa (Carbapenem Resistant) (08-22-22 @ 17:12)      -  Amikacin: S <=16      -  Aztreonam: R >16      -  Cefepime: R >16      -  Ceftazidime: R >16      -  Ceftazidime/Avibactam: R >16      -  Ceftolozane/tazobactam: S <=2      -  Ciprofloxacin: I 1      -  Gentamicin: S <=2      -  Imipenem: R >8      -  Levofloxacin: S <=0.5      -  Meropenem: R >8      -  Piperacillin/Tazobactam: I 64      -  Tobramycin: I 8      Method Type: PRATIK    Culture - Urine (collected 08-18-22 @ 20:00)  Source: Clean Catch Clean Catch (Midstream)  Final Report (08-21-22 @ 21:14):    10,000 - 49,000 CFU/mL Candida albicans "Susceptibilities not performed"    Culture - Blood (collected 08-18-22 @ 19:14)  Source: .Blood Blood-Peripheral  Final Report (08-24-22 @ 01:01):    No Growth Final    Culture - Blood (collected 08-18-22 @ 18:45)  Source: .Blood Blood-Peripheral  Final Report (08-24-22 @ 01:01):    No Growth Final        Radiology: reviewed

## 2022-08-26 NOTE — PROGRESS NOTE ADULT - ASSESSMENT
76F with dementia, COPD with chronic resp failure and is vent dependent, s/p PEG, hx of cardiac arrest, CHF, cor pulmonale, CVA, anemia, multiple admissions for respiratory distress who presents from [facility] for respiratory distress again.     Sepsis 2/2 PNA  Acute on chronic RF  B/l pleural effusions  - most recent cultures w/ serratia and CRE pseudomonas  - imaging reviewed  - cx reviewed. BCx NGTD. UCx Candida. RCx NOF  Plan  D/c Abx  Monitor off ABx  Supportive care, O2 and pulm toilet  trend temps/ wbc    Infectious Diseases will continue to follow. Please call with any questions.   Andressa Brantley M.D.  [facility], Division of Infectious Diseases [phone]

## 2022-08-26 NOTE — PROGRESS NOTE ADULT - ASSESSMENT
REVIEW OF SYMPTOMS      Able to give (reliable) ROS  NO     PHYSICAL EXAM    HEENT Unremarkable  atraumatic   RESP Fair air entry EXP prolonged    Harsh breath sound Resp distres mild   CARDIAC S1 S2 No S3     NO JVD    ABDOMEN SOFT BS PRESENT NOT DISTENDED No hepatosplenomegaly   PEDAL EDEMA present No calf tenderness  NO rash       AGE/SEX.   76 f  DOA.  8/18/2022  CC .  8/18/2022 respiratory failure, chronic trach vent, full code, recent Pneumonia/pleural effusion  shortness of breath    PROCEDURE  8/19/2022 [facility]     HOSPITAL COURSE.   WOUND CARE 8/18/2022   PNEUMONIA 8/18/2022 8/19 Tmax 101  LACTICEMIA 8/18/2022  VENT DEPENDENT  RESP FAILURE 8/18/2022    ANEMIA 8/18-8/19/2022 Hb 9.8 - 7    HYPERKALEMIA 8/18/2022   RYAN 8/18/2022   DM     PMH .  pmh Pneumonia    pmh 5/2/2022 SERRATIA CARBAPENEM RESISTANT PSEUDOMONAS   pmh HTN,   pmh DM,   pmh CVA,   pmh  chronic respiratory failure   pmh s/p trach, PEG,   pmh cardiac arrest  pmh Pl effsn  r PL EFFSN   6/8/2022 r thorac g 161 l 222 p 4.9 p 10 l 16 .38    l pl effsn  5/25/2022 g 183 l 144/381 .37 p 3.4/5.3 .64 p 23 l 36   5/25/2022l pl fluid  lymph pred exudate   cyto (-)           AGE/SEX.   76 f  DOA.  8/18/2022  CC .  8/18/2022 respiratory failure, chronic trach vent, full code, recent Pneumonia/pleural effusion  shortness of breath    PROCEDURE  8/19/2022 [facility]     HOSPITAL COURSE.   WOUND CARE 8/18/2022   PNEUMONIA 8/18/2022 8/19 Tmax 101  LACTICEMIA 8/18/2022  VENT DEPENDENT  RESP FAILURE 8/18/2022    ANEMIA 8/18-8/19/2022 Hb 9.8 - 7    HYPERKALEMIA 8/18/2022   RYAN 8/18/2022   DM     PMH .  pmh Pneumonia    pmh 5/2/2022 SERRATIA CARBAPENEM RESISTANT PSEUDOMONAS   pmh HTN,   pmh DM,   pmh CVA,   pmh  chronic respiratory failure   pmh s/p trach, PEG,   pmh cardiac arrest  pmh Pl effsn  r PL EFFSN   6/8/2022 r thorac g 161 l 222 p 4.9 p 10 l 16 .38    l pl effsn  5/25/2022 g 183 l 144/381 .37 p 3.4/5.3 .64 p 23 l 36   5/25/2022l pl fluid  lymph pred exudate   cyto (-)       GENERAL DATA .   GOC.  8/18/2022 full code      ALLGY.   codeine                          WT.     8/18/2022 63                               BMI.     8/18/2022 23                         ICU STAY. 8/18/2022  COVID. 8/18/2022 scv2 (-)       BEST PRACTICE ISSUES.    HOB ELEVATN. Yes  DVT PPLX.  8/18/2022 hpsc     SQUIRES PPLX.  8/18/2022 protonix 40     INFN PPLX. 8/18/2022 chlorhexidine .12%     8/19/2022 chlorhexidine 4%   SP SW EM.        DIET. 8/18/2022 gflucerna 1200 gt        VS/ PO/IO/ VENT/ DRIPS.   8/26/2022 afeb 71   8/26/2022 ac 16/400/5/.4     ASSESSMENT/RECOMMENDATIONS .   RESP.  VENT MANAGEMENT.  HOB elevation  Target Pplat 30 (-)  Target PO 90-95%  Target pH 730 (+)  Daily spontaneous breathing trials   Daily sedation vacation     GAS EXCHANGE.  vbg 8/18/2022 vbg pH 737   8/18/2022 8p   vent 100% 16/400 748/34/168     SEDATION.  8/20/2022 fentanyl 25.6 p    COPD.   8/18/2022 duoneb   8/19 nacl 7% bid     INFECTION.  WOUND CARE  8/18/2022 povidone l and r great toe   8/19/2022 collagenase buttocks     PNEUMONIA .  w 8/18-8/19-8/20-8/22-8/25-8/26/2022 w 25 - 11 - 14 - 7.5 - 9.2 -8.8  ua 8/18/2022 w 3-5   pr 8/20-8/21-8/22/2022 pr 3.3 - 1.7 - 1.2   ct ch 8/18   persistent mod bl effs   underlying dependent airspace consolidation   septal thickening and patchy ground glass opacities maddie r lung   ctap nc 8/20/2022 (-)   rvp 8/18/2022 (-)  bc 8/18/2022 (-)   mrsa 8/19/2022 (-)   8/19/2022  zosyn Se Vignesh  8/19/2022 bactrim ds 2 bid   a/r.    PAST MICROBIO.   5/2/2022 SERRATIA CARBAPENEM RESISTANT PSEUDOMONAS     PLEURAL EFFSN.  ct ch 8/18   persistent mod bl effs     CARDIAC.  LACTICEMIA.  la 8/18-8/19/2022 la 3.7- 1.2     CHF.  bnp 8/19-8/22/2022 bnp 54972  -  3505   echo 8/19/2022 n lvsf dd1 pasp 58   8/19/2022 lasix 40 one dose   8/24/2022 lasix 20     GI.  HEMAT.  ANEMIA.   Hb 8/18-8/19-8/20-8/21-7/22-8/23-8/25-8/26/2022   Hb 9.8 - 7-8.9   - 7.9 - 7.5 - 8.9 - 8.7 - 8.4   8/19/2022 7:21 PM recheck Hb   target Hb 7 (+)       TRANSFUSION   8/19 1 u prbc    RENAL.  RYAN.  Cr 8/18-8/19- 8/20-8/21-8/22-8/25-8/26/2022   Cr 1.2- 1.1 - 1.3 - 1.3 - 1.3 - 1.5 - 1.4   monitor    HYPONATREMIA.  Na 8/25/2022 na 133     IV fl.  8/25/2022 ns 75      ENDOCRINE.   DM.  8/18/2022 Insulin     NEURO.  8/19/2022 lorazepam 2.6   8/19/2022 methocarbamol 500.2       TIME SPENT   Over 39 minutes aggregate critical  care time spent on encounter; activities included   direct patient care, counseling and/or coordinating care reviewing notes, lab data/ imaging , discussion with multidisciplinary team/ patient  /family and explaining in detail risks, benefits, alternatives  of the recommendations     CHAPINCITO GUIDRY 76 f [facility] S 8/18/2022   DR JOSEPH DU

## 2022-09-01 NOTE — ED PROVIDER NOTE - NS ED MD DISPO DIVISION
[facility] NOTE    Drug Name: Prolia    Patient is being discontinued from [facility] Patient Management Program due to:   · Patient decision - patient switching providers    Goals of care: Met   1. Ensure adherence  2. Minimize side effects  3. Maximize patient’s response to therapy    Follow up plan: No further follow up needed     Trevon Duong [facility]  Phone: [phone]  [email or web address]      
[facility]

## 2022-09-09 NOTE — PROGRESS NOTE ADULT - PROVIDER SPECIALTY LIST ADULT
[facility]  Pediatric Cardiology  Progress Note    Patient Name: James Helm  MRN: [id number]  Admission Date: 9/6/2022  Hospital Length of Stay: 3 days  Code Status: Full Code   Attending Physician: Nitza Ellington MD   Primary Care Physician: Cruzito Ann MD  Expected Discharge Date:   Principal Problem:<principal problem not specified>    Subjective:     Interval History: Right chest tube was migrating out so removed this morning. Negative almost 3 L.      Objective:     Vital Signs (Most Recent):  Temp: 97.7 °F (36.5 °C) (09/08/22 0800)  Pulse: (!) 129 (09/08/22 1000)  Resp: (!) 21 (09/08/22 1000)  BP: (!) 95/53 (09/08/22 1000)  SpO2: 96 % (09/08/22 1000)   Vital Signs (24h Range):  Temp:  [97.7 °F (36.5 °C)-98.4 °F (36.9 °C)] 97.7 °F (36.5 °C)  Pulse:  [116-129] 129  Resp:  [15-33] 21  SpO2:  [93 %-100 %] 96 %  BP: ()/(50-65) 95/53     Weight: 59 kg (130 lb)  Body mass index is 19.2 kg/m².     SpO2: 96 %  O2 Device (Oxygen Therapy): room air    Intake/Output - Last 3 Shifts         09/06 0700 09/07 0659 09/07 0700  09/08 0659 09/08 0700  09/09 0659    P.O. 360 1013 240    I.V. (mL/kg) 176.9 (3) 384.5 (6.5) 59.1 (1)    IV Piggyback 175.1 117.3 15.6    Total Intake(mL/kg) 712 (12.1) 1514.8 (25.7) 314.7 (5.3)    Urine (mL/kg/hr) 1410 4240 (3) 1020 (4.2)    Emesis/NG output 0      Drains 2053 90 10    Other 155      Total Output 3618 4330 1030    Net -2906 -2815.3 -715.3           Urine Occurrence 1 x      Emesis Occurrence 1 x              Lines/Drains/Airways       Peripherally Inserted Central Catheter Line  Duration             PICC Double Lumen 06/15/22 1031 right brachial 85 days              Drain  Duration                  Closed/Suction Drain 09/06/22 1607 Inferior;Lateral;Right Pleural 1 day              Peripheral Intravenous Line  Duration                  Peripheral IV - Single Lumen 09/06/22 0915 20 G Anterior;Distal;Left Forearm 2 days                     Scheduled Medications:    amiodarone  200 mg Oral Daily    aspirin  81 mg Oral Daily    chlorothiazide (DIURIL) IV syringe (NICU/PICU/PEDS)  501.2 mg Intravenous Q12H    DULoxetine  60 mg Oral Daily    famotidine  20 mg Oral BID    furosemide (LASIX) injection  40 mg Intravenous Q6H    mycophenolate  1,000 mg Oral BID    pravastatin  20 mg Oral QAM    sirolimus  6 mg Oral Daily AM    spironolactone  25 mg Oral Daily    tacrolimus  3 mg Oral BID       Continuous Medications:    heparin in 0.9% NaCl 3 mL/hr (09/08/22 1000)    heparin in 0.9% NaCl 3 mL/hr (09/08/22 1000)    milrinone 20mg/100ml D5W (200mcg/ml) 0.5 mcg/kg/min (09/08/22 1008)       PRN Medications: morphine, oxyCODONE    Physical Exam  Constitutional: Appears well-developed but thin. He overall appears unwell with noted pallor. Actively vomiting during time of exam.    HENT:   Nose: Nose normal.   Mouth/Throat: Mucous membranes are moist. No oral lesions. No thrush. No tonsillar hypertrophy.   Eyes: Conjunctivae and EOM are normal.    Cardiovascular: +JVD. Mildly tachycardic, regular rhythm, S1 normal and split S2  2+ peripheral pulses.  Normal first and sec heart sound.  Grade 2/6 somewhat high-pitched systolic murmur at the left lower sternal border.  No gallop today.  Pulmonary/Chest: Effort normal and air entry decreased left side at the base. Improved tachypnea. Well healed median sternotomy and chest tube sites.  The left thoracotomy site is well-healed. No wheezes or rales.  Abdominal: Soft. Bowel sounds are normal.  Increased distension. Liver is down about less than 1 cm below the subcostal margin. There is no tenderness.   Neurological: Alert. Exhibits normal muscle tone.   Skin: Skin is warm and dry. Capillary refill takes less than 2 seconds. Turgor is normal. No cyanosis.   Extremities:  Left leg: No significant tenderness, edema, or deformity.  The knees are not swollen.  There is no erythema or warmth.  In the right leg  incisions are completely healed. Right calf smaller than left. No tenderness or significant erythema. There is no increased warmth.  Excellent distal pulses are noted.  There is no edema in the feet.  Extensive scarring on the right calf noted.  No evidence of infection. Multiple warts noted to both knees.  Significant Labs: All pertinent lab results from the last 24 hours have been reviewed. and   Recent Lab Results         09/08/22  0738   09/08/22  0342        Albumin   3.9       Alkaline Phosphatase   125       ALT   6       Anion Gap   14       AST   21       BILIRUBIN TOTAL   1.0  Comment: For infants and newborns, interpretation of results should be based  on gestational age, weight and in agreement with clinical  observations.    Premature Infant recommended reference ranges:  Up to 24 hours.............<8.0 mg/dL  Up to 48 hours............<12.0 mg/dL  3-5 days..................<15.0 mg/dL  6-29 days.................<15.0 mg/dL         BUN   15       Calcium   9.5       Chloride   94       CO2   28       Creatinine   1.3       eGFR   SEE COMMENT  Comment: Test not performed. GFR calculation is only valid for patients   19 and older.         Glucose   81       Magnesium   1.4       Phosphorus   5.4       Potassium   3.4       PROTEIN TOTAL   7.2       Sodium   136       Tacrolimus Lvl 15.5  Comment: Testing performed by a chemiluminescent microparticle   immunoassay on the [employer] System.                   Significant Imaging: Personally reviewed  CXR: Interval improvement of left effusion.     Echocardiogram:  Infradiaphragmatic TAPVR s/p repair with patent vertical vein and chronic dilated cardiomyopathy with severely depressed biventricular systolic function. - s/p orthotopic heart transplant with a biatrial anastomosis and ligation of the vertical vein at the diaphragm (2/3/19). - s/p severe cellular rejection with hemodynamic compromise needing ECMO (9/21-9/30/2020). IVC dilated There are  Critical Care multiple jets of tricuspid valve regurgitation, mild to moderate Moderate left atrial enlargement. Moderate right atrial enlargement. Right ventricle systolic pressure estimate normal. Right ventricle is mildly hypertrophied. Moderately decreased right ventricular systolic function. Moderate to large right pleural effusion. Septal hypokinesis with fair posterior wall contractility. Overall mild to moderately reduced left ventricular systolic function with an ejection fraction modified biplane of 41%. Abormal global longitudinal strain of -8% Large right pleural effusion. Moderate left pleural effusion. No pericardial effusion      Assessment and Plan:     Cardiac/Vascular  S/P orthotopic heart transplant  James Helm is a 15 y.o. male with:  1.  History of TAPVR s/p repair as a baby  2.  Orthotopic heart transplant on February 3, 2019 due to dilated cardiomyopathy  3.  Post transplant diabetes mellitus  4.  Acute systolic heart failure, severe cell mediated rejection, grade 3R (9/22/20) with hemodynamic compromise, repeat biopsy negative (10/6/20).   - V-A ECMO 9/23 (right foot perfusion catheter)  - LV vent 9/24, removed 9/27  - s/p ECMO decannulation (9/30)  - much improved ventricular function  5. AMR on cath 5/19/21 on steroid course. Repeat biopsy on 7/1/21, negative for rejection.  Biopsy negative rejection 10/24/21- treated with steroids.  Repeat Biopsy 2/23/22 negative for rejection.  6. Severe small vessel coronary disease noted on cath 11/30/21.  - chronic systolic and diastolic heart failure  7. History of atrial tachycardia  8. Compartment syndrome of right lower leg- s/p fasciotomy 10/3, closure 10/9.  Subsequent abscess necessitating drainage.  9. S/p bedside wound debridement and wound vac placement to left thoracotomy site (10/11/20) - pseudomonas.  Resolved.   10. Peripheral neuropathy per PMR (secondary to tacrolimus)  11. Worsening Pleural effusion on CXR 9/6/22.      Acute on chronic heart  failure. Echocardiogram looks relatively unchanged, the RV function may be a little decreased, but has a huge right sided effusion and moderate left sided effusion. This is likely from progression of his heart failure and I do not think it's related to allograft rejection. In the past when he rejected his troponin has bumped quite a bit, it's very mildly elevated today, not consistent with rejection. We will diurese and continue Milrinone. He remains a suitable transplant candidate and is listed status 1B.     Plan:  Neuro:  - Pain control per CICU    Respiratory  - RA  - Monitor CT drainage  - Daily CXR    CV:  - Lasix Q6, Diuril Q12  - Decrease Tacrolimus to 2mg- goal 5-8  - Continue Sirolimus- recheck level   - Continue Mycophenolate  - Repeat echocardiogram Friday  - Daily tacrolimus levels    FEN/GI:  - Regular diet  - Consult dietician for adequate calories  - Monitor renal function and lytes daily     Heme:  - ASA and pravastatin for CAV    ID:  - Discuss Hep B surface Ab- grayzone  - will discuss with ID             Ventura Armenta MD  Pediatric Cardiology  [facility]

## 2022-09-30 NOTE — ED ADULT NURSE NOTE - OBJECTIVE STATEMENT
pt is nonverbal, alert, et to vent(vent settings in order set) o2 98%, resp even and unlabored, iv placed on the foot according to md orders, presented to the Er for abnormal lab results, labs drawn, pending results, nad noted, will continue to monitor. .

## 2022-10-18 ENCOUNTER — INPATIENT (INPATIENT)
Facility: HOSPITAL | Age: 77
LOS: 14 days | Discharge: NURSING, REHAB OR LONG-TERM CARE | DRG: 870 | End: 2022-11-02
Attending: HOSPITALIST | Admitting: HOSPITALIST
Payer: MEDICARE

## 2022-10-18 VITALS
WEIGHT: 106.26 LBS | TEMPERATURE: 97 F | HEIGHT: 65 IN | HEART RATE: 94 BPM | DIASTOLIC BLOOD PRESSURE: 49 MMHG | SYSTOLIC BLOOD PRESSURE: 80 MMHG | RESPIRATION RATE: 15 BRPM

## 2022-10-18 DIAGNOSIS — Z93.0 TRACHEOSTOMY STATUS: Chronic | ICD-10-CM

## 2022-10-18 DIAGNOSIS — Z93.1 GASTROSTOMY STATUS: Chronic | ICD-10-CM

## 2022-10-18 DIAGNOSIS — J18.9 PNEUMONIA, UNSPECIFIED ORGANISM: ICD-10-CM

## 2022-10-18 LAB
ALBUMIN SERPL ELPH-MCNC: 1.5 G/DL — LOW (ref 3.3–5)
ALP SERPL-CCNC: 140 U/L — HIGH (ref 30–120)
ALT FLD-CCNC: 21 U/L DA — SIGNIFICANT CHANGE UP (ref 10–60)
ANION GAP SERPL CALC-SCNC: 6 MMOL/L — SIGNIFICANT CHANGE UP (ref 5–17)
APPEARANCE UR: CLEAR — SIGNIFICANT CHANGE UP
APTT BLD: 38 SEC — HIGH (ref 27.5–35.5)
AST SERPL-CCNC: 25 U/L — SIGNIFICANT CHANGE UP (ref 10–40)
BACTERIA # UR AUTO: ABNORMAL
BASE EXCESS BLDA CALC-SCNC: 6.5 MMOL/L — HIGH (ref -2–3)
BASOPHILS # BLD AUTO: 0.03 K/UL — SIGNIFICANT CHANGE UP (ref 0–0.2)
BASOPHILS NFR BLD AUTO: 0.2 % — SIGNIFICANT CHANGE UP (ref 0–2)
BILIRUB SERPL-MCNC: 0.4 MG/DL — SIGNIFICANT CHANGE UP (ref 0.2–1.2)
BILIRUB UR-MCNC: NEGATIVE — SIGNIFICANT CHANGE UP
BLD GP AB SCN SERPL QL: SIGNIFICANT CHANGE UP
BUN SERPL-MCNC: 127 MG/DL — HIGH (ref 7–23)
CALCIUM SERPL-MCNC: 8.8 MG/DL — SIGNIFICANT CHANGE UP (ref 8.4–10.5)
CHLORIDE SERPL-SCNC: 95 MMOL/L — LOW (ref 96–108)
CO2 SERPL-SCNC: 32 MMOL/L — HIGH (ref 22–31)
COLOR SPEC: YELLOW — SIGNIFICANT CHANGE UP
COMMENT - URINE: SIGNIFICANT CHANGE UP
CREAT SERPL-MCNC: 2.42 MG/DL — HIGH (ref 0.5–1.3)
DIFF PNL FLD: ABNORMAL
EGFR: 20 ML/MIN/1.73M2 — LOW
EOSINOPHIL # BLD AUTO: 0.08 K/UL — SIGNIFICANT CHANGE UP (ref 0–0.5)
EOSINOPHIL NFR BLD AUTO: 0.6 % — SIGNIFICANT CHANGE UP (ref 0–6)
EPI CELLS # UR: SIGNIFICANT CHANGE UP
GLUCOSE SERPL-MCNC: 412 MG/DL — HIGH (ref 70–99)
GLUCOSE UR QL: NEGATIVE MG/DL — SIGNIFICANT CHANGE UP
HCO3 BLDA-SCNC: 30 MMOL/L — HIGH (ref 21–28)
HCT VFR BLD CALC: 24.7 % — LOW (ref 34.5–45)
HGB BLD-MCNC: 7.4 G/DL — LOW (ref 11.5–15.5)
HOROWITZ INDEX BLDA+IHG-RTO: 100 — SIGNIFICANT CHANGE UP
IMM GRANULOCYTES NFR BLD AUTO: 1.5 % — HIGH (ref 0–0.9)
INR BLD: 1.23 RATIO — HIGH (ref 0.88–1.16)
KETONES UR-MCNC: NEGATIVE — SIGNIFICANT CHANGE UP
LACTATE SERPL-SCNC: 2 MMOL/L — SIGNIFICANT CHANGE UP (ref 0.7–2)
LEUKOCYTE ESTERASE UR-ACNC: ABNORMAL
LYMPHOCYTES # BLD AUTO: 1.45 K/UL — SIGNIFICANT CHANGE UP (ref 1–3.3)
LYMPHOCYTES # BLD AUTO: 11.1 % — LOW (ref 13–44)
MCHC RBC-ENTMCNC: 26.8 PG — LOW (ref 27–34)
MCHC RBC-ENTMCNC: 30 GM/DL — LOW (ref 32–36)
MCV RBC AUTO: 89.5 FL — SIGNIFICANT CHANGE UP (ref 80–100)
MONOCYTES # BLD AUTO: 0.84 K/UL — SIGNIFICANT CHANGE UP (ref 0–0.9)
MONOCYTES NFR BLD AUTO: 6.5 % — SIGNIFICANT CHANGE UP (ref 2–14)
NEUTROPHILS # BLD AUTO: 10.41 K/UL — HIGH (ref 1.8–7.4)
NEUTROPHILS NFR BLD AUTO: 80.1 % — HIGH (ref 43–77)
NITRITE UR-MCNC: NEGATIVE — SIGNIFICANT CHANGE UP
NRBC # BLD: 0 /100 WBCS — SIGNIFICANT CHANGE UP (ref 0–0)
PCO2 BLDA: 37 MMHG — HIGH (ref 32–35)
PH BLDA: 7.51 — HIGH (ref 7.35–7.45)
PH UR: 5 — SIGNIFICANT CHANGE UP (ref 5–8)
PLATELET # BLD AUTO: 168 K/UL — SIGNIFICANT CHANGE UP (ref 150–400)
PO2 BLDA: 257 MMHG — HIGH (ref 83–108)
POTASSIUM SERPL-MCNC: 5.6 MMOL/L — HIGH (ref 3.5–5.3)
POTASSIUM SERPL-SCNC: 5.6 MMOL/L — HIGH (ref 3.5–5.3)
PROCALCITONIN SERPL-MCNC: 23 NG/ML — HIGH (ref 0.02–0.1)
PROT SERPL-MCNC: 7.6 G/DL — SIGNIFICANT CHANGE UP (ref 6–8.3)
PROT UR-MCNC: 15 MG/DL
PROTHROM AB SERPL-ACNC: 14.2 SEC — HIGH (ref 10.5–13.4)
RAPID RVP RESULT: SIGNIFICANT CHANGE UP
RBC # BLD: 2.76 M/UL — LOW (ref 3.8–5.2)
RBC # FLD: 16.2 % — HIGH (ref 10.3–14.5)
RBC CASTS # UR COMP ASSIST: SIGNIFICANT CHANGE UP /HPF (ref 0–4)
SAO2 % BLDA: 99.8 % — HIGH (ref 94–98)
SARS-COV-2 RNA SPEC QL NAA+PROBE: SIGNIFICANT CHANGE UP
SODIUM SERPL-SCNC: 133 MMOL/L — LOW (ref 135–145)
SP GR SPEC: 1.01 — SIGNIFICANT CHANGE UP (ref 1.01–1.02)
UROBILINOGEN FLD QL: NEGATIVE MG/DL — SIGNIFICANT CHANGE UP
WBC # BLD: 13.01 K/UL — HIGH (ref 3.8–10.5)
WBC # FLD AUTO: 13.01 K/UL — HIGH (ref 3.8–10.5)
WBC UR QL: ABNORMAL

## 2022-10-18 PROCEDURE — 71250 CT THORAX DX C-: CPT | Mod: 26

## 2022-10-18 PROCEDURE — 99285 EMERGENCY DEPT VISIT HI MDM: CPT | Mod: FS

## 2022-10-18 PROCEDURE — 99223 1ST HOSP IP/OBS HIGH 75: CPT | Mod: AI

## 2022-10-18 PROCEDURE — 71045 X-RAY EXAM CHEST 1 VIEW: CPT | Mod: 26

## 2022-10-18 PROCEDURE — 93010 ELECTROCARDIOGRAM REPORT: CPT

## 2022-10-18 RX ORDER — SODIUM CHLORIDE 9 MG/ML
1000 INJECTION INTRAMUSCULAR; INTRAVENOUS; SUBCUTANEOUS ONCE
Refills: 0 | Status: COMPLETED | OUTPATIENT
Start: 2022-10-18 | End: 2022-10-18

## 2022-10-18 RX ORDER — IPRATROPIUM/ALBUTEROL SULFATE 18-103MCG
3 AEROSOL WITH ADAPTER (GRAM) INHALATION EVERY 6 HOURS
Refills: 0 | Status: DISCONTINUED | OUTPATIENT
Start: 2022-10-18 | End: 2022-10-18

## 2022-10-18 RX ORDER — ALBUTEROL 90 UG/1
2 AEROSOL, METERED ORAL EVERY 6 HOURS
Refills: 0 | Status: DISCONTINUED | OUTPATIENT
Start: 2022-10-18 | End: 2022-11-02

## 2022-10-18 RX ORDER — ONDANSETRON 8 MG/1
4 TABLET, FILM COATED ORAL EVERY 8 HOURS
Refills: 0 | Status: DISCONTINUED | OUTPATIENT
Start: 2022-10-18 | End: 2022-11-02

## 2022-10-18 RX ORDER — MEROPENEM 1 G/30ML
500 INJECTION INTRAVENOUS EVERY 12 HOURS
Refills: 0 | Status: DISCONTINUED | OUTPATIENT
Start: 2022-10-18 | End: 2022-10-19

## 2022-10-18 RX ORDER — IPRATROPIUM BROMIDE 0.2 MG/ML
1 SOLUTION, NON-ORAL INHALATION EVERY 6 HOURS
Refills: 0 | Status: DISCONTINUED | OUTPATIENT
Start: 2022-10-18 | End: 2022-11-02

## 2022-10-18 RX ORDER — HEPARIN SODIUM 5000 [USP'U]/ML
5000 INJECTION INTRAVENOUS; SUBCUTANEOUS EVERY 12 HOURS
Refills: 0 | Status: DISCONTINUED | OUTPATIENT
Start: 2022-10-18 | End: 2022-11-02

## 2022-10-18 RX ORDER — ACETAMINOPHEN 500 MG
650 TABLET ORAL EVERY 6 HOURS
Refills: 0 | Status: DISCONTINUED | OUTPATIENT
Start: 2022-10-18 | End: 2022-11-02

## 2022-10-18 RX ORDER — LANOLIN ALCOHOL/MO/W.PET/CERES
3 CREAM (GRAM) TOPICAL AT BEDTIME
Refills: 0 | Status: DISCONTINUED | OUTPATIENT
Start: 2022-10-18 | End: 2022-11-02

## 2022-10-18 RX ORDER — SIMETHICONE 80 MG/1
80 TABLET, CHEWABLE ORAL EVERY 12 HOURS
Refills: 0 | Status: DISCONTINUED | OUTPATIENT
Start: 2022-10-18 | End: 2022-11-02

## 2022-10-18 RX ORDER — SODIUM CHLORIDE 9 MG/ML
1000 INJECTION INTRAMUSCULAR; INTRAVENOUS; SUBCUTANEOUS
Refills: 0 | Status: DISCONTINUED | OUTPATIENT
Start: 2022-10-18 | End: 2022-10-22

## 2022-10-18 RX ORDER — PANTOPRAZOLE SODIUM 20 MG/1
40 TABLET, DELAYED RELEASE ORAL
Refills: 0 | Status: DISCONTINUED | OUTPATIENT
Start: 2022-10-18 | End: 2022-11-02

## 2022-10-18 RX ORDER — CHLORHEXIDINE GLUCONATE 213 G/1000ML
15 SOLUTION TOPICAL EVERY 12 HOURS
Refills: 0 | Status: DISCONTINUED | OUTPATIENT
Start: 2022-10-18 | End: 2022-11-02

## 2022-10-18 RX ADMIN — SODIUM CHLORIDE 1000 MILLILITER(S): 9 INJECTION INTRAMUSCULAR; INTRAVENOUS; SUBCUTANEOUS at 12:34

## 2022-10-18 RX ADMIN — SODIUM CHLORIDE 80 MILLILITER(S): 9 INJECTION INTRAMUSCULAR; INTRAVENOUS; SUBCUTANEOUS at 17:14

## 2022-10-18 RX ADMIN — SODIUM CHLORIDE 2000 MILLILITER(S): 9 INJECTION INTRAMUSCULAR; INTRAVENOUS; SUBCUTANEOUS at 15:16

## 2022-10-18 RX ADMIN — SODIUM CHLORIDE 1000 MILLILITER(S): 9 INJECTION INTRAMUSCULAR; INTRAVENOUS; SUBCUTANEOUS at 14:06

## 2022-10-18 RX ADMIN — HEPARIN SODIUM 5000 UNIT(S): 5000 INJECTION INTRAVENOUS; SUBCUTANEOUS at 17:14

## 2022-10-18 RX ADMIN — MEROPENEM 100 MILLIGRAM(S): 1 INJECTION INTRAVENOUS at 17:14

## 2022-10-18 RX ADMIN — SIMETHICONE 80 MILLIGRAM(S): 80 TABLET, CHEWABLE ORAL at 17:14

## 2022-10-18 RX ADMIN — SODIUM CHLORIDE 1000 MILLILITER(S): 9 INJECTION INTRAMUSCULAR; INTRAVENOUS; SUBCUTANEOUS at 11:40

## 2022-10-18 RX ADMIN — CHLORHEXIDINE GLUCONATE 15 MILLILITER(S): 213 SOLUTION TOPICAL at 17:14

## 2022-10-18 RX ADMIN — SODIUM CHLORIDE 1000 MILLILITER(S): 9 INJECTION INTRAMUSCULAR; INTRAVENOUS; SUBCUTANEOUS at 12:49

## 2022-10-18 NOTE — H&P ADULT - HISTORY OF PRESENT ILLNESS
76F with dementia, COPD with chronic resp failure and is vent dependent, s/p PEG, hx of cardiac arrest, CHF, cor pulmonale, CVA, COVID-19 infxn, CKD, anemia, multiple admissions for respiratory distress (last admission from 6/1-6/9  and now august diagnosed with PNA with parapneumonic pleural effusion s/p course of ertapenam, who presents from [facility] for respiratory distress . Patient not verbal, demented unable to obtain any hx    ER course:  Vitals stable afebrile  Imaging:  labs noted:hb 7.4., wbc 13.,  Bun 122, Cr 2.4

## 2022-10-18 NOTE — ED ADULT NURSE NOTE - NSIMPLEMENTINTERV_GEN_ALL_ED
Implemented All Fall with Harm Risk Interventions:  [place] to call system. Call bell, personal items and telephone within reach. Instruct patient to call for assistance. Room bathroom lighting operational. Non-slip footwear when patient is off stretcher. Physically safe environment: no spills, clutter or unnecessary equipment. Stretcher in lowest position, wheels locked, appropriate side rails in place. Provide visual cue, wrist band, yellow gown, etc. Monitor gait and stability. Monitor for mental status changes and reorient to person, place, and time. Review medications for side effects contributing to fall risk. Reinforce activity limits and safety measures with patient and family. Provide visual clues: red socks.

## 2022-10-18 NOTE — PATIENT PROFILE ADULT - FALL HARM RISK - HARM RISK INTERVENTIONS
Communicate Risk of Fall with Harm to all staff/Reinforce activity limits and safety measures with patient and family/Tailored Fall Risk Interventions/Visual Cue: Yellow wristband and red socks/Bed in lowest position, wheels locked, appropriate side rails in place/Call bell, personal items and telephone in reach/Instruct patient to call for assistance before getting out of bed or chair/Non-slip footwear when patient is out of bed/[place] to call system/Physically safe environment - no spills, clutter or unnecessary equipment/Purposeful Proactive Rounding/Room/bathroom lighting operational, light cord in reach Assistance with ambulation/Assistance OOB with selected safe patient handling equipment/Communicate Risk of Fall with Harm to all staff/Discuss with provider need for PT consult/Monitor gait and stability/Reinforce activity limits and safety measures with patient and family/Tailored Fall Risk Interventions/Visual Cue: Yellow wristband and red socks/Bed in lowest position, wheels locked, appropriate side rails in place/Call bell, personal items and telephone in reach/Instruct patient to call for assistance before getting out of bed or chair/Non-slip footwear when patient is out of bed/[place] to call system/Physically safe environment - no spills, clutter or unnecessary equipment/Purposeful Proactive Rounding/Room/bathroom lighting operational, light cord in reach

## 2022-10-18 NOTE — PROGRESS NOTE ADULT - SUBJECTIVE AND OBJECTIVE BOX
HPI: 77 y/o F w/ pmh of dementia, copd w/ chronic resp failure now vent dependent s/p trach/peg, diastolic HF, cor pulmonale, CVA, CKD, anemia of chronic dz, multiple prior admission for pna/sepsis, now presented from SNF earlier today for resp distress suspected to be 2/2 to pna, pt was admitted to the SPCU for acute on chronic hypoxic resp failure 2/2 to pna, sepsis, chronic anemia, RYAN, hyperK, hyperglycemia and mild hypoNa     24 hour events:     Review of Systems: Unable to obtain 2/2 to trach/peg    T(F): 96 (10-18-22 @ 17:00), Max: 97.2 (10-18-22 @ 11:15)  HR: 94 (10-18-22 @ 18:00) (82 - 95)  BP: 94/58 (10-18-22 @ 18:00) (80/49 - 111/53)  RR: 16 (10-18-22 @ 18:00) (15 - 26)  SpO2: 100% (10-18-22 @ 18:00) (96% - 100%)  Wt(kg): --    Mode: AC/ CMV (Assist Control/ Continuous Mandatory Ventilation), RR (machine): 18, TV (machine): 400, FiO2: 60, PEEP: 5  10-18-22 @ 07:01  -  10-18-22 @ 19:51  --------------------------------------------------------  IN: 2290 mL / OUT: 350 mL / NET: 1940 mL        CAPILLARY BLOOD GLUCOSE          I&O's Summary    18 Oct 2022 07:01  -  18 Oct 2022 19:51  --------------------------------------------------------  IN: 2290 mL / OUT: 350 mL / NET: 1940 mL        Physical Exam:   Gen:  Neuro:  HEENT:  Resp:  CVS:  Abd:  Ext:  Skin:    Meds:  meropenem  IVPB IV Intermittent          acetaminophen     Tablet .. Oral PRN  melatonin Oral PRN  ondansetron Injectable IV Push PRN      heparin   Injectable SubCutaneous    aluminum hydroxide/magnesium hydroxide/simethicone Suspension Oral PRN  pantoprazole    Tablet Oral  simethicone Chew      sodium chloride 0.9%. IV Continuous      chlorhexidine 0.12% Liquid Oral Mucosa                              7.4    13.01 )-----------( 168      ( 18 Oct 2022 11:30 )             24.7       10-18    133<L>  |  95<L>  |  127<H>  ----------------------------<  412<H>  5.6<H>   |  32<H>  |  2.42<H>    Ca    8.8      18 Oct 2022 11:30    TPro  7.6  /  Alb  1.5<L>  /  TBili  0.4  /  DBili  x   /  AST  25  /  ALT  21  /  AlkPhos  140<H>  10-18    Lactate 2.0           10-18 @ 12:00          PT/INR - ( 18 Oct 2022 12:00 )   PT: 14.2 sec;   INR: 1.23 ratio         PTT - ( 18 Oct 2022 12:00 )  PTT:38.0 sec  Urinalysis Basic - ( 18 Oct 2022 12:27 )    Color: Yellow / Appearance: Clear / S.015 / pH: x  Gluc: x / Ketone: Negative  / Bili: Negative / Urobili: Negative mg/dL   Blood: x / Protein: 15 mg/dL / Nitrite: Negative   Leuk Esterase: Moderate / RBC: 0-2 /HPF / WBC 11-25   Sq Epi: x / Non Sq Epi: Occasional / Bacteria: Many      Rapid RVP Result: NotDetec (10-18 @ 11:30)        Radiology: ***  Bedside ultrasound: ***    CENTRAL LINE: N/Y          DATE INSERTED:              REMOVE: Y/N  REID: N/Y                       DATE INSERTED:              REMOVE: Y/N  A-LINE: N/Y                       DATE INSERTED:              REMOVE: Y/N    GLOBAL ISSUE/BEST PRACTICE:  Analgesia:  Sedation:  CAM-ICU:   HOB elevation: yes  Stress ulcer prophylaxis:  VTE prophylaxis:  Glycemic control:  Nutrition:    CODE STATUS: ***    CRITICAL CARE TIME SPENT:  (Assessing presenting problems of acute illness, which pose high probability of life threatening deterioration or end organ damage/dysfunction, as well as medical decision making including initiating plan of care, reviewing data, reviewing radiologic exams, discussing with multidisciplinary team,  discussing goals of care with patient/family, and writing this note.  Non-inclusive of procedures performed)     HPI: 77 y/o F w/ pmh of dementia, copd w/ chronic resp failure now vent dependent s/p trach/peg, diastolic HF, cor pulmonale, CVA, CKD, anemia of chronic dz, multiple prior admission for pna/sepsis, now presented from SNF earlier today for resp distress suspected to be 2/2 to pna, pt was admitted to the SPCU for acute on chronic hypoxic resp failure 2/2 to pna, sepsis, chronic anemia, RYAN, hyperK, hyperglycemia and mild hypoNa.    24 hour events: Tonight pt planned for 1U of PRBC, currently awaiting Type and screen confirmation    Review of Systems: Unable to obtain 2/2 to trach/peg    T(F): 96 (10-18-22 @ 17:00), Max: 97.2 (10-18-22 @ 11:15)  HR: 94 (10-18-22 @ 18:00) (82 - 95)  BP: 94/58 (10-18-22 @ 18:00) (80/49 - 111/53)  RR: 16 (10-18-22 @ 18:00) (15 - 26)  SpO2: 100% (10-18-22 @ 18:00) (96% - 100%)  Wt(kg): --    Mode: AC/ CMV (Assist Control/ Continuous Mandatory Ventilation), RR (machine): 18, TV (machine): 400, FiO2: 60, PEEP: 5  10-18-22 @ 07:01  -  10-18-22 @ 19:51  --------------------------------------------------------  IN: 2290 mL / OUT: 350 mL / NET: 1940 mL        CAPILLARY BLOOD GLUCOSE          I&O's Summary    18 Oct 2022 07:01  -  18 Oct 2022 19:51  --------------------------------------------------------  IN: 2290 mL / OUT: 350 mL / NET: 1940 mL        Physical Exam:   Gen: Comfortable in bed in NAD  Neuro: awake but non-verbal at baseline  HEENT: NC/AT  Resp: good air entry b/l  CVS: +RRR  Abd: BSx4, soft, nt/nd  Ext: no edema   Skin: warm/dry    Meds:  meropenem  IVPB IV Intermittent          acetaminophen     Tablet .. Oral PRN  melatonin Oral PRN  ondansetron Injectable IV Push PRN      heparin   Injectable SubCutaneous    aluminum hydroxide/magnesium hydroxide/simethicone Suspension Oral PRN  pantoprazole    Tablet Oral  simethicone Chew      sodium chloride 0.9%. IV Continuous      chlorhexidine 0.12% Liquid Oral Mucosa                              7.4    13.01 )-----------( 168      ( 18 Oct 2022 11:30 )             24.7       10-18    133<L>  |  95<L>  |  127<H>  ----------------------------<  412<H>  5.6<H>   |  32<H>  |  2.42<H>    Ca    8.8      18 Oct 2022 11:30    TPro  7.6  /  Alb  1.5<L>  /  TBili  0.4  /  DBili  x   /  AST  25  /  ALT  21  /  AlkPhos  140<H>  10-18    Lactate 2.0           10-18 @ 12:00          PT/INR - ( 18 Oct 2022 12:00 )   PT: 14.2 sec;   INR: 1.23 ratio         PTT - ( 18 Oct 2022 12:00 )  PTT:38.0 sec  Urinalysis Basic - ( 18 Oct 2022 12:27 )    Color: Yellow / Appearance: Clear / S.015 / pH: x  Gluc: x / Ketone: Negative  / Bili: Negative / Urobili: Negative mg/dL   Blood: x / Protein: 15 mg/dL / Nitrite: Negative   Leuk Esterase: Moderate / RBC: 0-2 /HPF / WBC 11-25   Sq Epi: x / Non Sq Epi: Occasional / Bacteria: Many      Rapid RVP Result: NotDetec (10-18 @ 11:30)    Radiology:     < from: CT Chest No Cont (10.18.22 @ 13:13) >    ACC: [id number] EXAM:  CT CHEST                          PROCEDURE DATE:  10/18/2022          INTERPRETATION:  CLINICAL INFORMATION: Hypoxia    COMPARISON: 2022    CONTRAST/COMPLICATIONS:  IV Contrast: NONE  Oral Contrast: NONE  Complications: None reported at time of study completion    PROCEDURE:  CT of the Chest was performed.  Sagittal and coronal reformats were performed.    FINDINGS:    LUNGS AND AIRWAYS: Patent central airways.  Lungs are remarkable for   groundglass opacities in the posterior upper lobes with interlobular   septal thickening. Infiltrate or atelectasis is seen in the lower lobes   left more than right. Patchy densities are also seen in the posterior   upper lobes. Tracheostomy is again appreciated  PLEURA: Small right and small to moderate left pleural effusion with a   large subpulmonic component.  MEDIASTINUM AND JHONNY: Limited for evaluation of adenopathy without IV   contrast. However there are again enlarged mediastinal lymph nodes likely   reactive. The upper esophagus is air filled  VESSELS: Atherosclerotic calcification of the aorta and coronary arteries.  HEART: Heart size is normal. No pericardial effusion.  CHEST WALL AND LOWER NECK: Within normal limits.  VISUALIZED UPPER ABDOMEN: PEG tube is seen. Adrenal glands are thickened.  BONES: Degenerative changes. Again seen is compression fracture of the L3   vertebral body    IMPRESSION: Groundglass opacities and interlobular septal thickening   suggestive of CHF. There are also some more confluent areas of density   which may represent atelectasis versus infiltrates.  Bilateral pleural effusions have decreased from the prior exam  Tracheostomy and gastrostomy    --- End of Report ---    MINOR STEWART MD; Attending Radiologist  This document has been electronically signed. Oct 18 2022  2:45PM    < end of copied text >      GLOBAL ISSUE/BEST PRACTICE:  Analgesia: N  Sedation: N  CAM-ICU: n/a  HOB elevation: Y  Stress ulcer prophylaxis: Y  VTE prophylaxis: Y (SCD)  Glycemic control: Y  Nutrition: Y    CODE STATUS: FULL CODE

## 2022-10-18 NOTE — ED PROVIDER NOTE - OBJECTIVE STATEMENT
76-year-old female with history of diabetes mellitus, GERD, COPD, history of respiratory failure, hypertension, dementia, history of subarachnoid hemorrhage, and history of chronic trach brought in by ambulance for evaluation of low CO2 and cyanotic appearance.  Per EMS report, staff noticed patient had a low CO2 around 20 and appeared cyanotic so called ambulance to send to hospital for evaluation.  Patient is nonverbal, chronically trached.  Limited history.  Per EMS, patient was being treated for pneumonia but is not sure when and what antibiotic.  No antibiotics listed on medical reconciliation from [facility].  PCP Paul Merrill  resident at [facility]

## 2022-10-18 NOTE — ED PROVIDER NOTE - PROGRESS NOTE DETAILS
levaquin given. IVF infusing. was seen by Dr. Sandoval and Emre. will admit to SPCU. Spoke with Dr. Rios, accepts patient for admission

## 2022-10-18 NOTE — ED ADULT NURSE NOTE - CHIEF COMPLAINT QUOTE
patient is brought in by EMS from [facility], c/o respiratory distress. patient is vent dependant patient

## 2022-10-18 NOTE — PROGRESS NOTE ADULT - ASSESSMENT
75 y/o F w/ pmh of dementia, copd w/ chronic resp failure now vent dependent s/p trach/peg, diastolic HF, cor pulmonale, CVA, CKD, anemia of chronic dz, multiple prior admission for pna/sepsis, now presented from SNF earlier today for resp distress suspected to be 2/2 to pna, pt was admitted to the SPCU for acute on chronic hypoxic resp failure 2/2 to pna, sepsis, chronic anemia, RYAN, hyperK, hyperglycemia and mild hypoNa.    -Neuro: No acute issues, MS appears to be at baseline  -Cardiac: Mild hypotension planned for 1U of PRBC goal is to maintain MAP >65  -Resp: Acute on chronic hypoxic resp failure maintain lung protective ventilation, will titrate vent settings as needed to maintain o2 >90%, cont Chest PT/pulm zurdo, b/l pleural effusion improved cont to monitor   -GI: NPO for now, GI ppx w/ protonix  -Renal: RYAN on CKD now w/ mild hyperK was tx w/ Dextrose and insulin, currently on IVF, will monitor renal function and lytes closely, mild HypoNa cont to monitor  -ID: Pna currently on IV merro, f/u pm final cx data  -Endo: DM2 currently w/ hyperglycemia possible from dextrose, will start pt on POC q6h FS and tx with ISS as needed to maintain MS FS b/w 140-180 and check A1C levels  -Heme: Anemia planned for 1U of PRBC doubt active GIB at this time  given no bloody stool likely 2/2 to anemia of chronic dz, will trend CBC post transfusion and check FOBT ,DVT ppx w/ heparin  -Dispo: Pt is full code and remains in the SPCU 75 y/o F w/ pmh of dementia, copd w/ chronic resp failure now vent dependent s/p trach/peg, diastolic HF, cor pulmonale, CVA, CKD, anemia of chronic dz, multiple prior admission for pna/sepsis, now presented from SNF earlier today for resp distress suspected to be 2/2 to pna, pt was admitted to the SPCU for acute on chronic hypoxic resp failure 2/2 to pna, sepsis, chronic anemia, RYAN, hyperK, hyperglycemia and mild hypoNa.    -Neuro: No acute issues, MS appears to be at baseline, Anxiety cont home dose of lorazepam 1mg q4h prn for anxiety/agitation  -Cardiac: Mild hypotension planned for 1U of PRBC goal is to maintain MAP >65  -Resp: Acute on chronic hypoxic resp failure maintain lung protective ventilation, will titrate vent settings as needed to maintain o2 >90%, cont Chest PT/pulm zurdo, b/l pleural effusion improved cont to monitor   -GI: NPO for now, GI ppx w/ protonix  -Renal: RYAN on CKD now w/ mild hyperK was tx w/ Dextrose and insulin, currently on IVF, will monitor renal function and lytes closely, mild HypoNa cont to monitor  -ID: Pna currently on IV merro, f/u pm final cx data  -Endo: DM2 currently w/ hyperglycemia possible from dextrose, will start pt on POC q6h FS and tx with ISS as needed to maintain MS FS b/w 140-180 and check A1C levels  -Heme: Anemia planned for 1U of PRBC doubt active GIB at this time  given no bloody stool likely 2/2 to anemia of chronic dz, will trend CBC post transfusion and check FOBT ,DVT ppx w/ heparin  -Dispo: Pt is full code and remains in the SPCU

## 2022-10-18 NOTE — CONSULT NOTE ADULT - ASSESSMENT
Pt is a 76W w/ PMHx of DM2, COPD, HTN, dementia, hx of subarachnoid hemorrhage, and history of chronic trach brought in by ambulance for evaluation of low CO2 and cyanotic appearance.   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 Please call with any questions.   Andressa Brantley M.D.  [facility] Division of Infectious Diseases [phone]

## 2022-10-18 NOTE — H&P ADULT - NSHPLABSRESULTS_GEN_ALL_CORE
Labs:                          7.4    13.01 )-----------( 168      ( 18 Oct 2022 11:30 )             24.7     10-18    133<L>  |  95<L>  |  127<H>  ----------------------------<  412<H>  5.6<H>   |  32<H>  |  2.42<H>    Ca    8.8      18 Oct 2022 11:30    TPro  7.6  /  Alb  1.5<L>  /  TBili  0.4  /  DBili  x   /  AST  25  /  ALT  21  /  AlkPhos  140<H>  10-18    LIVER FUNCTIONS - ( 18 Oct 2022 11:30 )  Alb: 1.5 g/dL / Pro: 7.6 g/dL / ALK PHOS: 140 U/L / ALT: 21 U/L DA / AST: 25 U/L / GGT: x           PT/INR - ( 18 Oct 2022 12:00 )   PT: 14.2 sec;   INR: 1.23 ratio         PTT - ( 18 Oct 2022 12:00 )  PTT:38.0 sec      Active Medications  MEDICATIONS  (STANDING):  chlorhexidine 0.12% Liquid 15 milliLiter(s) Oral Mucosa every 12 hours  heparin   Injectable 5000 Unit(s) SubCutaneous every 12 hours  meropenem  IVPB 500 milliGRAM(s) IV Intermittent every 12 hours  pantoprazole    Tablet 40 milliGRAM(s) Oral before breakfast  simethicone 80 milliGRAM(s) Chew every 12 hours  sodium chloride 0.9% Bolus 1000 milliLiter(s) IV Bolus once  sodium chloride 0.9% Bolus 1000 milliLiter(s) IV Bolus once    MEDICATIONS  (PRN):  acetaminophen     Tablet .. 650 milliGRAM(s) Oral every 6 hours PRN Temp greater or equal to 38C (100.4F), Mild Pain (1 - 3)  aluminum hydroxide/magnesium hydroxide/simethicone Suspension 30 milliLiter(s) Oral every 4 hours PRN Dyspepsia  melatonin 3 milliGRAM(s) Oral at bedtime PRN Insomnia  ondansetron Injectable 4 milliGRAM(s) IV Push every 8 hours PRN Nausea and/or Vomiting

## 2022-10-18 NOTE — CONSULT NOTE ADULT - SUBJECTIVE AND OBJECTIVE BOX
BREA BECKHAM    [place]  ED    Allergies    codeine (Hives)    Intolerances        PAST MEDICAL & SURGICAL HISTORY:  Dementia of frontal lobe type      Aphasic stroke      Diabetes mellitus      Respiratory failure      Hypertension      GERD (gastroesophageal reflux disease)      Constipation      Respiratory failure      CVA (cerebral vascular accident)      HTN (hypertension)      DM (diabetes mellitus)      Advanced dementia      COVID-19 virus detected      Quadriplegia      Pneumonia      Type II diabetes mellitus      Hx of appendectomy      Gastrostomy in place      Tracheostomy in place      Tracheostomy tube present      Feeding by G-tube          FAMILY HISTORY:      Home Medications:  albuterol 90 mcg/inh inhalation aerosol with adapter: 2  inhaled every 6 hours (21 May 2022 21:16)  Bacid (LAC) oral tablet: 2 tab(s) by gastrostomy tube once a day (21 May 2022 21:16)  Betadine 10% topical swab: cleanse right and left great toes (21 May 2022 21:16)  Carafate 1 g/10 mL oral suspension: 10 milliliter(s) by gastrostomy tube 4 times a day (before meals and at bedtime) for 14 days (Started 6/4/21) (21 May 2022 21:16)  chlorhexidine 0.12% mucous membrane liquid: 15 milliliter(s) mucous membrane 2 times a day (21 May 2022 21:16)  Eucerin topical cream: Apply topically to affected area once a day bilateral feet (21 May 2022 21:16)  folic acid 1 mg oral tablet: 1 tab(s) orally once a day (21 May 2022 21:16)  furosemide 20 mg oral tablet: 1 tab(s) orally once a day (10 Zay 2022 12:12)  insulin glargine 100 units/mL subcutaneous solution: 8 unit(s) subcutaneous once a day (in the morning) (01 Jun 2022 08:24)  ipratropium-albuterol 0.5 mg-2.5 mg/3 mL inhalation solution: 3 milliliter(s) inhaled 4 times a day (21 May 2022 21:16)  LORazepam 1 mg oral tablet: 1 tab(s) by gastrostomy tube every 4 hours (21 May 2022 21:16)  methocarbamol 500 mg oral tablet: 1 tab(s) by gastrostomy tube 2 times a day (21 May 2022 21:16)  Multiple Vitamins oral tablet: 1 tab(s) orally once a day (21 May 2022 21:16)  nystatin 100,000 units/g topical powder: 1 application topically 3 times a day (29 May 2022 16:35)  omeprazole 20 mg oral delayed release capsule: orally 2 times a day (21 May 2022 23:14)  polyethylene glycol 3350 oral powder for reconstitution: 17 gram(s) by gastrostomy tube every 12 hours (21 May 2022 21:16)  senna 8.6 mg oral tablet: 3 tab(s) by gastrostomy tube once a day (at bedtime) (21 May 2022 21:16)  simethicone 80 mg oral tablet, chewable: 1 tab(s) by gastrostomy tube every 6 hours (21 May 2022 21:16)  Tylenol 325 mg oral tablet: 2 tab(s) by gastrostomy tube once a day; 60 minutes prior to dressing change  (21 May 2022 21:16)  Tylenol 325 mg oral tablet: 2 tab(s) by gastrostomy tube every 6 hours, As Needed (21 May 2022 21:16)      MEDICATIONS  (STANDING):  chlorhexidine 0.12% Liquid 15 milliLiter(s) Oral Mucosa every 12 hours  levoFLOXacin IVPB 750 milliGRAM(s) IV Intermittent once  sodium chloride 0.9% Bolus 1000 milliLiter(s) IV Bolus once    MEDICATIONS  (PRN):              Vital Signs Last 24 Hrs  T(C): 36.2 (18 Oct 2022 11:15), Max: 36.2 (18 Oct 2022 11:15)  T(F): 97.2 (18 Oct 2022 11:15), Max: 97.2 (18 Oct 2022 11:15)  HR: 94 (18 Oct 2022 11:30) (94 - 94)  BP: 80/49 (18 Oct 2022 11:15) (80/49 - 80/49)  BP(mean): --  RR: 15 (18 Oct 2022 11:15) (15 - 15)  SpO2: 96% (18 Oct 2022 11:30) (96% - 96%)    Parameters below as of 18 Oct 2022 11:15  Patient On (Oxygen Delivery Method): ventilator            Mode: AC/ CMV (Assist Control/ Continuous Mandatory Ventilation), RR (machine): 18, TV (machine): 400, FiO2: 100, PEEP: 5, ITime: 1, MAP: 11, PIP: 28      LABS:                    WBC:      MICROBIOLOGY:  RECENT CULTURES:                  Sodium:          Hemoglobin:      Platelets:             RADIOLOGY & ADDITIONAL STUDIES:      MICROBIOLOGY:  RECENT CULTURES:

## 2022-10-18 NOTE — ED PROVIDER NOTE - CLINICAL SUMMARY MEDICAL DECISION MAKING FREE TEXT BOX
Chronically vented patient brought in by EMS from [facility] for respiratory distress.  Per EMS staff at [facility] noted patient's end-tidal CO2 was low and that patient appeared cyanotic so they called EMS to bring to ER for evaluation.  EMS relates patient recently treated for pneumonia unsure when.  No further history available at this time.    Plan EKG chest x-ray labs antibiotics IV fluid

## 2022-10-18 NOTE — ED ADULT NURSE NOTE - OBJECTIVE STATEMENT
Patient BIBA from [facility] for respiratory distress.  Patient is vent-dependant (tracheostomy in place).  PEG tube present on arrival.  PICC line in place on arrival; flushes without difficulty, area without redness, dressing clean, dry and intact.  History of DM, Quadriplegia, Dementia, HTN, CVA and aphasia.  Patient is non-verbal at baseline and on assessment.  Multiple un-stageable wounds to buttocks and one on right anterior knee.  Areas are dressed and dry. Patient BIBA from [facility] for respiratory distress.  Patient is vent-dependant (tracheostomy in place).  PEG tube present on arrival.  PICC line in place on arrival; flushes without difficulty, area without redness, dressing clean, dry and intact.  History of DM, Quadriplegia, Dementia, HTN, CVA and aphasia.  Patient is non-verbal at baseline and on assessment.  Multiple un-stageable wounds to buttocks and one on right anterior knee.  Areas are dressed and dry.  Patient is cachectic and contracted.

## 2022-10-18 NOTE — ED ADULT TRIAGE NOTE - CHIEF COMPLAINT QUOTE
patient is brought in by EMS from [facility] ER, c/o respiratory distress. patient is vent dependant patient

## 2022-10-18 NOTE — CONSULT NOTE ADULT - SUBJECTIVE AND OBJECTIVE BOX
[facility], Division of Infectious Diseases  MOIZ Lora S. Shah, Y. Patel, G. [facility]  [phone]    BREA BECKHAM  76y, Female  [id number]    HPI--  HPI: Pt is a 76W w/ PMHx of DM2, COPD, HTN, dementia, hx of subarachnoid hemorrhage, and history of chronic trach brought in by ambulance for evaluation of low CO2 and cyanotic appearance.  Per EMS report, staff noticed patient had a low CO2 around 20 and appeared cyanotic so called ambulance to send to hospital for evaluation.    Patient is nonverbal, chronically trached.  Limited history.  Per EMS, patient was being treated for pneumonia but is not sure when and what antibiotic.    Pt seen and examined at bedside in the ED  Well-known and has had multiple admissions for Acute on chronic RF and PNA w/ MDROs  Most recent admission in end of August, most recent cx w/ Pseudomonas and Stenotrophomonas    Labs notable for leukocytosis to 13, CXR w/ infiltrate, official read pending      Active Medications--  acetaminophen     Tablet .. 650 milliGRAM(s) Oral every 6 hours PRN  aluminum hydroxide/magnesium hydroxide/simethicone Suspension 30 milliLiter(s) Oral every 4 hours PRN  chlorhexidine 0.12% Liquid 15 milliLiter(s) Oral Mucosa every 12 hours  melatonin 3 milliGRAM(s) Oral at bedtime PRN  ondansetron Injectable 4 milliGRAM(s) IV Push every 8 hours PRN  sodium chloride 0.9% Bolus 1000 milliLiter(s) IV Bolus once    Antimicrobials:     Immunologic:     ROS:  unable to obtain    Allergies: codeine (Hives)    PMH -- Dementia of frontal lobe type    Aphasic stroke    Diabetes mellitus    Respiratory failure    Hypertension    GERD (gastroesophageal reflux disease)    Constipation    Respiratory failure    CVA (cerebral vascular accident)    HTN (hypertension)    DM (diabetes mellitus)    Advanced dementia    COVID-19 virus detected    Quadriplegia    Pneumonia    Type II diabetes mellitus      PSH -- Hx of appendectomy    Gastrostomy in place    Tracheostomy in place    Tracheostomy tube present    Feeding by G-tube      FH -- No pertinent family history in first degree relatives    No pertinent family history in first degree relatives    No pertinent family history in first degree relatives    No pertinent family history in first degree relatives      Social History --  EtOH: denies   Tobacco: denies   Drug Use: denies     Travel/Environmental/Occupational History:    Physical Exam--  Vital Signs Last 24 Hrs  T(F): 97.2 (18 Oct 2022 11:15), Max: 97.2 (18 Oct 2022 11:15)  HR: 82 (18 Oct 2022 12:31) (82 - 94)  BP: 103/49 (18 Oct 2022 12:31) (80/49 - 103/49)  RR: 18 (18 Oct 2022 12:31) (15 - 18)  SpO2: 100% (18 Oct 2022 12:31) (96% - 100%)  General: NAD  HEENT: NC/AT, EOMI,   Neck: trach to vent  Lungs: MV breath sounds  Heart: Regular rate and rhythm. No murmur, rub or gallop.  Abdomen: Soft. Nondistended. Nontender. B  Skin: Warm. Dry.      Laboratory & Imaging Data:  CBC:                       7.4    13.01 )-----------( 168      ( 18 Oct 2022 11:30 )             24.7     CMP: 10-18    133<L>  |  95<L>  |  127<H>  ----------------------------<  412<H>  5.6<H>   |  32<H>  |  2.42<H>    Ca    8.8      18 Oct 2022 11:30    TPro  7.6  /  Alb  1.5<L>  /  TBili  0.4  /  DBili  x   /  AST  25  /  ALT  21  /  AlkPhos  140<H>  10-18    LIVER FUNCTIONS - ( 18 Oct 2022 11:30 )  Alb: 1.5 g/dL / Pro: 7.6 g/dL / ALK PHOS: 140 U/L / ALT: 21 U/L DA / AST: 25 U/L / GGT: x           Urinalysis Basic - ( 18 Oct 2022 12:27 )    Color: Yellow / Appearance: Clear / S.015 / pH: x  Gluc: x / Ketone: Negative  / Bili: Negative / Urobili: Negative mg/dL   Blood: x / Protein: 15 mg/dL / Nitrite: Negative   Leuk Esterase: Moderate / RBC: 0-2 /HPF / WBC 11-25   Sq Epi: x / Non Sq Epi: Occasional / Bacteria: Many        Microbiology: reviewed        Radiology: reviewed

## 2022-10-18 NOTE — CONSULT NOTE ADULT - SUBJECTIVE AND OBJECTIVE BOX
Date/Time Patient Seen:  		  Referring MD:   Data Reviewed	       Patient is a 76y old  Female who presents with a chief complaint of Acute on Hypoxemic respiratory failure (18 Oct 2022 13:07)      Subjective/HPI  vs noted  labs reviewed  h and p reviewed  er provider note reviewed  imaging reviewed  known to me well   at bedside       76F with dementia, COPD with chronic resp failure and is vent dependent, s/p PEG, hx of cardiac arrest, CHF, cor pulmonale, CVA, COVID-19 infxn, CKD, anemia, multiple admissions  PAST MEDICAL & SURGICAL HISTORY:  Dementia of frontal lobe type    Aphasic stroke    Diabetes mellitus    Respiratory failure    Hypertension    GERD (gastroesophageal reflux disease)    Constipation    Respiratory failure    CVA (cerebral vascular accident)    HTN (hypertension)    DM (diabetes mellitus)    Advanced dementia    COVID-19 virus detected    Quadriplegia    Pneumonia    Type II diabetes mellitus    Hx of appendectomy    Gastrostomy in place    Tracheostomy in place    Tracheostomy tube present    Feeding by G-tube          Medication list         MEDICATIONS  (STANDING):  chlorhexidine 0.12% Liquid 15 milliLiter(s) Oral Mucosa every 12 hours  heparin   Injectable 5000 Unit(s) SubCutaneous every 12 hours  meropenem  IVPB 500 milliGRAM(s) IV Intermittent every 12 hours  pantoprazole    Tablet 40 milliGRAM(s) Oral before breakfast  simethicone 80 milliGRAM(s) Chew every 12 hours    MEDICATIONS  (PRN):  acetaminophen     Tablet .. 650 milliGRAM(s) Oral every 6 hours PRN Temp greater or equal to 38C (100.4F), Mild Pain (1 - 3)  aluminum hydroxide/magnesium hydroxide/simethicone Suspension 30 milliLiter(s) Oral every 4 hours PRN Dyspepsia  melatonin 3 milliGRAM(s) Oral at bedtime PRN Insomnia  ondansetron Injectable 4 milliGRAM(s) IV Push every 8 hours PRN Nausea and/or Vomiting         Vitals log        ICU Vital Signs Last 24 Hrs  T(C): 35.6 (18 Oct 2022 17:00), Max: 36.2 (18 Oct 2022 11:15)  T(F): 96 (18 Oct 2022 17:00), Max: 97.2 (18 Oct 2022 11:15)  HR: 95 (18 Oct 2022 15:00) (82 - 95)  BP: 88/48 (18 Oct 2022 15:00) (80/49 - 111/53)  BP(mean): 60 (18 Oct 2022 15:00) (60 - 60)  ABP: --  ABP(mean): --  RR: 21 (18 Oct 2022 15:00) (15 - 21)  SpO2: 100% (18 Oct 2022 15:00) (96% - 100%)    O2 Parameters below as of 18 Oct 2022 13:21  Patient On (Oxygen Delivery Method): ventilator             Mode: AC/ CMV (Assist Control/ Continuous Mandatory Ventilation)  RR (machine): 18  TV (machine): 400  FiO2: 60  PEEP: 5  ITime: 1  MAP: 10  PIP: 24      Input and Output:  I&O's Detail      Lab Data                        7.4    13.01 )-----------( 168      ( 18 Oct 2022 11:30 )             24.7     10-18    133<L>  |  95<L>  |  127<H>  ----------------------------<  412<H>  5.6<H>   |  32<H>  |  2.42<H>    Ca    8.8      18 Oct 2022 11:30    TPro  7.6  /  Alb  1.5<L>  /  TBili  0.4  /  DBili  x   /  AST  25  /  ALT  21  /  AlkPhos  140<H>  10-18    ABG - ( 18 Oct 2022 11:40 )  pH, Arterial: 7.51  pH, Blood: x     /  pCO2: 37    /  pO2: 257   / HCO3: 30    / Base Excess: 6.5   /  SaO2: 99.8                    Review of Systems	  ventilated      Objective     Physical Examination    heart s1s2  lung dec BS  head nc  trach      Pertinent Lab findings & Imaging      Annalise:  NO   Adequate UO     I&O's Detail           Discussed with:     Cultures:	        Radiology

## 2022-10-18 NOTE — H&P ADULT - ASSESSMENT
75yo F with PMH of dementia, COPD with chronic resp failure and is vent dependent, s/p PEG, hx of cardiac arrest, diastolic CHF, cor pulmonale, hx of CVA, COVID-19 infxn, CKD, anemia, multiple admissions for respiratory distress (recent admission from 6/1-6/9 diagnosed with PNA with parapneumonic pleural effusion s/p thoracentesis and Avycaz) who presents from [facility] for respiratory distress again a/w sepsis and respiratory failure due to suspected recurrent gram-negative PNA.    Severe sepsis and acute hypoxic respiratory failure 2/2 gram-negative PNA   - continue current vent settings (on AC with RR 16, , PEEP 5, FiO2 100%) maintain O2 sat >92%  - was on ertapenem, as per ID changed to zosyn+bactrim for coverage of prior admissions cultures of MDR Serratia and CRE Pseudomonas  - lactate downtrending, procalcitonin high but improving with treatment (4.76 -> 3.34 -> 1.77)  - cautious use of fluids given hx of CHF (received 1750cc of NS in ED)  - f/u blood cultures (NGTD), urine culture pending   - f/u trach sputum culture - has GNR growing awaiting speciation / sensitivities  - has hx of respiratory distress driven by vent asynchrony and would require IV benzos ; trialed IV fentanyl with adequate effect this evening  - Checked CT Abd/P to eval for any other potential source sign of infection and found no significant sign of intra-abdominal infection on CT  - cc/pulm consult (Jaime), recs appreciated  - palliative care (Emre), recs appreciated - pt is full code    Diastolic CHF  Hx of Pleural effusions  - last TTE was 5/30 with EF 65% with normal LVSF, dilated   RV, and moderate pHTN -> repeat TTE appears unchanged  - may need repeat thoracentesis (had 1.5L drained three admissions ago), pulmonology consulted; was parapneumonic on that admission as opposed to transudative and thus less likely related to CHF    hyperkalemia RYAN likely due to sepsis  will give bolus of fluids: careful use going forward.  repeat Cr and K    Anemia of chronic disease  - has been evaluated by GI and hematology in the past -> dx with ACD with intermittent GI bleeding  - monitor H&H, if continues to trend down - will give another transfusion  -giving one unit   - consider nephro eval to assess if appropriate to give Retacrit    Hyperkalemia  - given both insulin and lasix   still remains elevated  nutrition consulted    Hx of CKD stage 3 - near baseline of 1.2-1.3  - renally dose medications and monitor BMP and electrolytes   - Cr is 1.3 with eGFR of 41, but given pt has low muscle mass from being bedbound for years, this GFR estimate is likely falsely high  - consider nephro eval   nutrition consulted    HTN  - not on any BP meds at facility  - monitor VS    DM2  - NPO with TF  - continue with insulin 8units qAM as per last admission  - c/w moderate insulin coverage scale  - goal -180    Diet  - continue with same TF from last admission    Preventive measures  - cont with heparin subq for DVT ppx  - Full Code

## 2022-10-18 NOTE — H&P ADULT - NSHPPHYSICALEXAM_GEN_ALL_CORE
Objective:    Vitals:  T(C): 36.2 (10-18-22 @ 11:15), Max: 36.2 (10-18-22 @ 11:15)  HR: 86 (10-18-22 @ 13:21) (82 - 94)  BP: 111/53 (10-18-22 @ 13:21) (80/49 - 111/53)  RR: 18 (10-18-22 @ 13:21) (15 - 18)  SpO2: 96% (10-18-22 @ 13:21) (96% - 100%)    Physical Exam:  General: chronically ill appearing  HEENT: Atraumatic, no LAD, trachea midline, PERRLA  Cardiovascular: normal s1s2, no murmurs, gallops or fricition rubs  Pulmonary: decreased no wheezing , rhonchi  Gastrointestinal: soft non tender non distended, no masses felt, no organomegally  Muscloskeletal: no lower extremity edema, intact bilateral lower extremity pulses  Neurological: CN II-12 intact. No focal weakness  Psychiatrical: normal mood, cooperative  SKIN: no rash, lesions or ulcers

## 2022-10-18 NOTE — CONSULT NOTE ADULT - ASSESSMENT
76F with dementia, COPD with chronic resp failure and is vent dependent, s/p PEG, hx of cardiac arrest, CHF, cor pulmonale, CVA, COVID-19 infxn, CKD, anemia, multiple admissions    dementia  COPD  chr resp failure  vegetative state  dysphagia  peg  hx of card arrest - anoxic brain  CHF  cva

## 2022-10-18 NOTE — CONSULT NOTE ADULT - ASSESSMENT
Initial evaluation/Pulmonary Critical Care consultation requested on  10/18/2022 by Dr Alex    from Dr Sandoval   Patient examined chart reviewed    HOSPITAL ADMISSION   PATIENT CAME  FROM (if information available)      REVIEW OF SYMPTOMS      Able to give (reliable) ROS  NO     PHYSICAL EXAM    HEENT Unremarkable  atraumatic   RESP Fair air entry EXP prolonged    Harsh breath sound Resp distres mild   CARDIAC S1 S2 No S3     NO JVD    ABDOMEN SOFT BS PRESENT NOT DISTENDED No hepatosplenomegaly   PEDAL EDEMA present No calf tenderness  NO rash       PATIENT PRESENTATION.  76W w/ PMHx of DM2, COPD, HTN, dementia, hx of subarachnoid hemorrhage, and history of chronic trach brought in by ambulance for evaluation of low CO2 and cyanotic appearance.  Per EMS report, staff noticed patient had a low CO2 around 20 and appeared cyanotic so called ambulance to send to hospital for evaluation.    Patient is nonverbal, chronically trached.  Limited history.  Per EMS, patient was being treated for pneumonia but is not sure when and what antibiotic.    Pt seen and examined at bedside in the ED  Well-known and has had multiple admissions for Acute on chronic RF and PNA w/ MDROs  Most recent admission in end of August, most recent cx w/ Pseudomonas and Stenotrophomonas  Pulm crit care consulted 10/18/2022                                            AGE/SEX.   76 f  DOA.  10/18/2022  CC .  10/18/2022 resp distress  PRESENTING PROBLEMS .   RESP DISTRESS 10/18/2022  VAP 10/18/2022   ANEMIA 10/18/2022   HYPONATREMIA 10/18/2022   HYPERKALEMIA 10/18/2022   RYAN 10/18/2022   COURSE.     PAST ID ISSUES   8/19/2022  zosyn Se Vignesh  8/19/2022 bactrim ds 2 bid   pmh 5/2/2022 SERRATIA CARBAPENEM RESISTANT PSEUDOMONAS   PMH .  pmh Trach  pmh peg  pmh NO idf  pmh Pneumonia    pmh HTN,   pmh DM,   pmh CVA,   pmh  chronic respiratory failure   pmh s/p trach, PEG,   pmh cardiac arrest  pmh pulm hytn echo 8/19/2022 n lvsf dd1 pasp 58    pmh Pl effsn  r PL EFFSN   6/8/2022 r thorac g 161 l 222 p 4.9 p 10 l 16 .38    l pl effsn  5/25/2022 g 183 l 144/381 .37 p 3.4/5.3 .64 p 23 l 36   5/25/2022l pl fluid  lymph pred exudate   cyto (-)         GENERAL DATA .   GOC.  10/18/2022 full code       ALLGY. codeine                            WT.          10/18/2022 48  BMI.          10/18/2022 17                     ICU STAY. 10/18/2022  COVID.  10/18/2022 scv2 (-)     BEST PRACTICE ISSUES.    HOB ELEVATN. Yes  DVT PPLX.   10/18/2022 hpsc    SQUIRES PPLX.  10/18/2022 protonix 40     INFN PPLX.    10/18/2022 ch;lorhexidine .12%     ASSESSMENT/RECOMMENDATIONS .   RESP.  -- Gas exchange.   10/18/2022 ac 18/400/100/5 751/37/257  -- VENT MANAGEMENT.   HOB elevation  Target Pplat 30 (-)  Target PO 90-95%  Target pH 730 (+)  Daily spontaneous breathing trials   Daily sedation vacation   -- Pleuralk effsn  Past ritchie   R THORAC   6/8/2022  g 161 l 222 p 4.9 p 10 l 16 .38    L THORAC   5/25/2022 g 183 l 144/381 .37 p 3.4/5.3 .64 p 23 l 36   5/25/2022l pl fluid  lymph pred exudate   cyto (-)     Ct ch 10/18/2022 sm r mod l effs large subpulmonic component   a/r No thoracentesis plannd at this point risk prohibitive in vent pt   -- Mediastinal lne.  Ct ch 10/18/2022 again enlarged mediastinal lns likely reactive   a/r will need followup with ct in 2m and consider biopsy if persists   INFECTION.  PAST ID ISSUES   8/19/2022  zosyn Se Vignesh  8/19/2022 bactrim ds 2 bid   pmh 5/2/2022 SERRATIA CARBAPENEM RESISTANT PSEUDOMONAS  -- VAP 10/18/2022.  -- UTI 10/18/2022   w 10/18/2022 w 13   ua 10/18/2022 w 11-25   ct ch 10/18/2022 cw 8/18/20232 ggo post upper lobes with interlobular septal thickening infiltrate or atelectasis lower lobes l more than   sm r and sm to mod l effsn  rvp 10/18/2022 (-)   10/18/2022 meropenem Dr NEWTON   10/18/2022 ID Dr Brantley on case      CARDIAC.  -- Hypotension.  10/18/2022 88/48   echo 8/19/2022 n lvsf dd1 pasp 58   Target MAP 65 (+)   GI.  -- ELEVATED LFTS.  LFTS 10/18/2022   ap 140  ast 25  alt 21   monitor  HEMAT.  -- Anemia.  Hb 10/18/2022 Hb 7.4   inr 10/18/2022 inr 1.23   Monitor  target Hb 7 (+)   RENAL.  -- Hyponatremia.  Na 10/18/2022 Na 133   monitor correct  -- Hyperkalemia.  K 10/18/2022 K 5.6   monitor correct  -- RYAN.  Cr 10/18/2022 Cr 2.42  Cr 8/26/2022 Cr 1.4   Fluid renal us monitor  IV fl.  -- 10/18/2022 ns 70     TIME SPENT   Over 55 minutes aggregate critical care time spent on encounter; activities included   direct patient care, counseling and/or coordinating care reviewing notes, lab data/ imaging , discussion with multidisciplinary team/ patient  /family and explaining in detail risks, benefits, alternatives  of the recommendations     CHAPINCITO GUIDRY 76 f [place] 10/18/2022   DR ANG PADGETT

## 2022-10-18 NOTE — CONSULT NOTE ADULT - SUBJECTIVE AND OBJECTIVE BOX
History of Present Illness: The patient is a 76 year old female with a history of HTN, DM, CVA, dementia, chronic respiratory failure s/p trach, PEG, cardiac arrest, anemia, pleural effusions who presents with hypoxia. The patient is unable to provide additional history. She has had multiple recent admissions for acute on chronic respiratory failure.    Past Medical/Surgical History:  HTN, DM, CVA, dementia, chronic respiratory failure s/p trach, PEG, cardiac arrest, anemia, pleural effusions     Medications:  Home Medications:  albuterol 90 mcg/inh inhalation aerosol with adapter: 2  inhaled every 6 hours (18 Oct 2022 11:42)  Bacid (LAC) oral tablet: 2 tab(s) by gastrostomy tube once a day (18 Oct 2022 11:42)  Betadine 10% topical swab: cleanse right and left great toes (18 Oct 2022 11:42)  chlorhexidine 0.12% mucous membrane liquid: 15 milliliter(s) mucous membrane 2 times a day (18 Oct 2022 11:42)  Eucerin topical cream: Apply topically to affected area once a day bilateral feet (18 Oct 2022 11:42)  insulin glargine 100 units/mL subcutaneous solution: 8 unit(s) subcutaneous once a day (in the morning) (18 Oct 2022 11:42)  ipratropium-albuterol 0.5 mg-2.5 mg/3 mL inhalation solution: 3 milliliter(s) inhaled 4 times a day (18 Oct 2022 11:42)  LORazepam 1 mg oral tablet: 1 tab(s) by gastrostomy tube every 4 hours (18 Oct 2022 11:42)  methocarbamol 500 mg oral tablet: 1 tab(s) by gastrostomy tube 2 times a day (18 Oct 2022 11:42)  MiraLax oral powder for reconstitution: g-tube (18 Oct 2022 13:04)  Multiple Vitamins oral tablet: 1 tab(s) orally once a day (18 Oct 2022 11:42)  nystatin 100,000 units/g topical powder: 1 application topically 3 times a day (18 Oct 2022 11:42)  omeprazole 20 mg oral delayed release capsule: orally 2 times a day (18 Oct 2022 11:42)  polyethylene glycol 3350 oral powder for reconstitution: 17 gram(s) by gastrostomy tube every 12 hours (18 Oct 2022 11:42)  senna 8.6 mg oral tablet: 3 tab(s) by gastrostomy tube once a day (at bedtime) (18 Oct 2022 11:42)  simethicone 80 mg oral tablet: g-tube (18 Oct 2022 13:04)  simethicone 80 mg oral tablet, chewable: 1 tab(s) by gastrostomy tube every 6 hours (18 Oct 2022 11:42)  sucralfate 1 g/10 mL oral suspension: 10 milliliter(s) g-tube 4 times a day (before meals and at bedtime) (18 Oct 2022 13:04)  Tylenol 325 mg oral tablet: 2 tab(s) by gastrostomy tube once a day; 60 minutes prior to dressing change  (18 Oct 2022 11:42)  Tylenol 325 mg oral tablet: 2 tab(s) by gastrostomy tube every 6 hours, As Needed (18 Oct 2022 11:42)      Family History: Non-contributory family history of premature cardiovascular atherosclerotic disease    Social History: Unable to obtain    Review of Systems:  Unable to obtain    Physical Exam:  Vitals:        Vital Signs Last 24 Hrs  T(C): 35.6 (18 Oct 2022 17:00), Max: 36.2 (18 Oct 2022 11:15)  T(F): 96 (18 Oct 2022 17:00), Max: 97.2 (18 Oct 2022 11:15)  HR: 92 (18 Oct 2022 17:00) (82 - 95)  BP: 93/51 (18 Oct 2022 17:00) (80/49 - 111/53)  BP(mean): 64 (18 Oct 2022 17:00) (60 - 65)  RR: 19 (18 Oct 2022 17:00) (15 - 26)  SpO2: 100% (18 Oct 2022 17:00) (96% - 100%)  Parameters below as of 18 Oct 2022 17:00  Patient On (Oxygen Delivery Method): ventilator  O2 Concentration (%): 60  General: Unresponsive  HEENT: Trach  Neck: No JVD, no carotid bruit  Lungs: CTAB  CV: RRR, nl S1/S2, no M/R/G  Abdomen: S/NT/ND, +BS  Extremities: No LE edema, no cyanosis  Neuro: AAOx30  Skin: No rash    Labs:                        7.4    13.01 )-----------( 168      ( 18 Oct 2022 11:30 )             24.7     10-18    133<L>  |  95<L>  |  127<H>  ----------------------------<  412<H>  5.6<H>   |  32<H>  |  2.42<H>    Ca    8.8      18 Oct 2022 11:30    TPro  7.6  /  Alb  1.5<L>  /  TBili  0.4  /  DBili  x   /  AST  25  /  ALT  21  /  AlkPhos  140<H>  10-18        PT/INR - ( 18 Oct 2022 12:00 )   PT: 14.2 sec;   INR: 1.23 ratio         PTT - ( 18 Oct 2022 12:00 )  PTT:38.0 sec    ECG:      History of Present Illness: The patient is a 76 year old female with a history of HTN, DM, CVA, dementia, chronic respiratory failure s/p trach, PEG, cardiac arrest, anemia, pleural effusions who presents with hypoxia. The patient is unable to provide additional history. She has had multiple recent admissions for acute on chronic respiratory failure.    Past Medical/Surgical History:  HTN, DM, CVA, dementia, chronic respiratory failure s/p trach, PEG, cardiac arrest, anemia, pleural effusions     Medications:  Home Medications:  albuterol 90 mcg/inh inhalation aerosol with adapter: 2  inhaled every 6 hours (18 Oct 2022 11:42)  Bacid (LAC) oral tablet: 2 tab(s) by gastrostomy tube once a day (18 Oct 2022 11:42)  Betadine 10% topical swab: cleanse right and left great toes (18 Oct 2022 11:42)  chlorhexidine 0.12% mucous membrane liquid: 15 milliliter(s) mucous membrane 2 times a day (18 Oct 2022 11:42)  Eucerin topical cream: Apply topically to affected area once a day bilateral feet (18 Oct 2022 11:42)  insulin glargine 100 units/mL subcutaneous solution: 8 unit(s) subcutaneous once a day (in the morning) (18 Oct 2022 11:42)  ipratropium-albuterol 0.5 mg-2.5 mg/3 mL inhalation solution: 3 milliliter(s) inhaled 4 times a day (18 Oct 2022 11:42)  LORazepam 1 mg oral tablet: 1 tab(s) by gastrostomy tube every 4 hours (18 Oct 2022 11:42)  methocarbamol 500 mg oral tablet: 1 tab(s) by gastrostomy tube 2 times a day (18 Oct 2022 11:42)  MiraLax oral powder for reconstitution: g-tube (18 Oct 2022 13:04)  Multiple Vitamins oral tablet: 1 tab(s) orally once a day (18 Oct 2022 11:42)  nystatin 100,000 units/g topical powder: 1 application topically 3 times a day (18 Oct 2022 11:42)  omeprazole 20 mg oral delayed release capsule: orally 2 times a day (18 Oct 2022 11:42)  polyethylene glycol 3350 oral powder for reconstitution: 17 gram(s) by gastrostomy tube every 12 hours (18 Oct 2022 11:42)  senna 8.6 mg oral tablet: 3 tab(s) by gastrostomy tube once a day (at bedtime) (18 Oct 2022 11:42)  simethicone 80 mg oral tablet: g-tube (18 Oct 2022 13:04)  simethicone 80 mg oral tablet, chewable: 1 tab(s) by gastrostomy tube every 6 hours (18 Oct 2022 11:42)  sucralfate 1 g/10 mL oral suspension: 10 milliliter(s) g-tube 4 times a day (before meals and at bedtime) (18 Oct 2022 13:04)  Tylenol 325 mg oral tablet: 2 tab(s) by gastrostomy tube once a day; 60 minutes prior to dressing change  (18 Oct 2022 11:42)  Tylenol 325 mg oral tablet: 2 tab(s) by gastrostomy tube every 6 hours, As Needed (18 Oct 2022 11:42)      Family History: Non-contributory family history of premature cardiovascular atherosclerotic disease    Social History: Unable to obtain    Review of Systems:  Unable to obtain    Physical Exam:  Vitals:        Vital Signs Last 24 Hrs  T(C): 35.6 (18 Oct 2022 17:00), Max: 36.2 (18 Oct 2022 11:15)  T(F): 96 (18 Oct 2022 17:00), Max: 97.2 (18 Oct 2022 11:15)  HR: 92 (18 Oct 2022 17:00) (82 - 95)  BP: 93/51 (18 Oct 2022 17:00) (80/49 - 111/53)  BP(mean): 64 (18 Oct 2022 17:00) (60 - 65)  RR: 19 (18 Oct 2022 17:00) (15 - 26)  SpO2: 100% (18 Oct 2022 17:00) (96% - 100%)  Parameters below as of 18 Oct 2022 17:00  Patient On (Oxygen Delivery Method): ventilator  O2 Concentration (%): 60  General: Unresponsive  HEENT: Trach  Neck: No JVD, no carotid bruit  Lungs: CTAB  CV: RRR, nl S1/S2, no M/R/G  Abdomen: S/NT/ND, +BS  Extremities: No LE edema, no cyanosis  Neuro: AAOx30  Skin: No rash    Labs:                        7.4    13.01 )-----------( 168      ( 18 Oct 2022 11:30 )             24.7     10-18    133<L>  |  95<L>  |  127<H>  ----------------------------<  412<H>  5.6<H>   |  32<H>  |  2.42<H>    Ca    8.8      18 Oct 2022 11:30    TPro  7.6  /  Alb  1.5<L>  /  TBili  0.4  /  DBili  x   /  AST  25  /  ALT  21  /  AlkPhos  140<H>  10-18        PT/INR - ( 18 Oct 2022 12:00 )   PT: 14.2 sec;   INR: 1.23 ratio         PTT - ( 18 Oct 2022 12:00 )  PTT:38.0 sec    ECG: NSR, RBBB

## 2022-10-18 NOTE — CONSULT NOTE ADULT - ASSESSMENT
The patient is a 76 year old female with a history of HTN, DM, CVA, dementia, chronic respiratory failure s/p trach, PEG, cardiac arrest, anemia, pleural effusions who presents with hypoxia.  The patient is a 76 year old female with a history of HTN, DM, CVA, dementia, chronic respiratory failure s/p trach, PEG, cardiac arrest, anemia, pleural effusions who presents with hypoxia.     Plan:  - ECG with no evidence of ischemia or infarction  - Echo 8/22 with normal LV systolic function, mod pulm HTN, IVC small/collapsible  - CT chest with small bilateral pleural effusions, GGO, and infiltrates  - Pleural effusions previously were exudative, likely parapneumonic  - Hold furosemide  - Procalcitonin significantly elevated  - IV antibiotics

## 2022-10-19 LAB
A1C WITH ESTIMATED AVERAGE GLUCOSE RESULT: 8.5 % — HIGH (ref 4–5.6)
ALBUMIN SERPL ELPH-MCNC: 1.7 G/DL — LOW (ref 3.3–5)
ALP SERPL-CCNC: 131 U/L — HIGH (ref 30–120)
ALT FLD-CCNC: 103 U/L DA — HIGH (ref 10–60)
ANION GAP SERPL CALC-SCNC: 16 MMOL/L — SIGNIFICANT CHANGE UP (ref 5–17)
AST SERPL-CCNC: 179 U/L — HIGH (ref 10–40)
BASOPHILS # BLD AUTO: 0.01 K/UL — SIGNIFICANT CHANGE UP (ref 0–0.2)
BASOPHILS NFR BLD AUTO: 0.1 % — SIGNIFICANT CHANGE UP (ref 0–2)
BILIRUB SERPL-MCNC: 0.5 MG/DL — SIGNIFICANT CHANGE UP (ref 0.2–1.2)
BUN SERPL-MCNC: 102 MG/DL — HIGH (ref 7–23)
CALCIUM SERPL-MCNC: 7.5 MG/DL — LOW (ref 8.4–10.5)
CHLORIDE SERPL-SCNC: 103 MMOL/L — SIGNIFICANT CHANGE UP (ref 96–108)
CO2 SERPL-SCNC: 22 MMOL/L — SIGNIFICANT CHANGE UP (ref 22–31)
CREAT SERPL-MCNC: 2.24 MG/DL — HIGH (ref 0.5–1.3)
EGFR: 22 ML/MIN/1.73M2 — LOW
EOSINOPHIL # BLD AUTO: 0.06 K/UL — SIGNIFICANT CHANGE UP (ref 0–0.5)
EOSINOPHIL NFR BLD AUTO: 0.7 % — SIGNIFICANT CHANGE UP (ref 0–6)
ESTIMATED AVERAGE GLUCOSE: 197 MG/DL — HIGH (ref 68–114)
GLUCOSE BLDC GLUCOMTR-MCNC: 168 MG/DL — HIGH (ref 70–99)
GLUCOSE BLDC GLUCOMTR-MCNC: 197 MG/DL — HIGH (ref 70–99)
GLUCOSE BLDC GLUCOMTR-MCNC: 261 MG/DL — HIGH (ref 70–99)
GLUCOSE BLDC GLUCOMTR-MCNC: 314 MG/DL — HIGH (ref 70–99)
GLUCOSE BLDC GLUCOMTR-MCNC: 371 MG/DL — HIGH (ref 70–99)
GLUCOSE SERPL-MCNC: 306 MG/DL — HIGH (ref 70–99)
GRAM STN FLD: SIGNIFICANT CHANGE UP
HCT VFR BLD CALC: 21 % — CRITICAL LOW (ref 34.5–45)
HCT VFR BLD CALC: 24.1 % — LOW (ref 34.5–45)
HGB BLD-MCNC: 6.3 G/DL — CRITICAL LOW (ref 11.5–15.5)
HGB BLD-MCNC: 7.5 G/DL — LOW (ref 11.5–15.5)
IMM GRANULOCYTES NFR BLD AUTO: 2.4 % — HIGH (ref 0–0.9)
LYMPHOCYTES # BLD AUTO: 1.04 K/UL — SIGNIFICANT CHANGE UP (ref 1–3.3)
LYMPHOCYTES # BLD AUTO: 11.8 % — LOW (ref 13–44)
MAGNESIUM SERPL-MCNC: 3 MG/DL — HIGH (ref 1.6–2.6)
MCHC RBC-ENTMCNC: 26.4 PG — LOW (ref 27–34)
MCHC RBC-ENTMCNC: 26.8 PG — LOW (ref 27–34)
MCHC RBC-ENTMCNC: 30 GM/DL — LOW (ref 32–36)
MCHC RBC-ENTMCNC: 31.1 GM/DL — LOW (ref 32–36)
MCV RBC AUTO: 86.1 FL — SIGNIFICANT CHANGE UP (ref 80–100)
MCV RBC AUTO: 87.9 FL — SIGNIFICANT CHANGE UP (ref 80–100)
MONOCYTES # BLD AUTO: 0.42 K/UL — SIGNIFICANT CHANGE UP (ref 0–0.9)
MONOCYTES NFR BLD AUTO: 4.8 % — SIGNIFICANT CHANGE UP (ref 2–14)
NEUTROPHILS # BLD AUTO: 7.07 K/UL — SIGNIFICANT CHANGE UP (ref 1.8–7.4)
NEUTROPHILS NFR BLD AUTO: 80.2 % — HIGH (ref 43–77)
NRBC # BLD: 0 /100 WBCS — SIGNIFICANT CHANGE UP (ref 0–0)
NRBC # BLD: 0 /100 WBCS — SIGNIFICANT CHANGE UP (ref 0–0)
OB PNL STL: NEGATIVE — SIGNIFICANT CHANGE UP
PHOSPHATE SERPL-MCNC: 4.9 MG/DL — HIGH (ref 2.5–4.5)
PLATELET # BLD AUTO: 115 K/UL — LOW (ref 150–400)
PLATELET # BLD AUTO: 204 K/UL — SIGNIFICANT CHANGE UP (ref 150–400)
POTASSIUM SERPL-MCNC: 5.8 MMOL/L — HIGH (ref 3.5–5.3)
POTASSIUM SERPL-SCNC: 5.8 MMOL/L — HIGH (ref 3.5–5.3)
PROT SERPL-MCNC: 6.2 G/DL — SIGNIFICANT CHANGE UP (ref 6–8.3)
RBC # BLD: 2.39 M/UL — LOW (ref 3.8–5.2)
RBC # BLD: 2.8 M/UL — LOW (ref 3.8–5.2)
RBC # FLD: 16.4 % — HIGH (ref 10.3–14.5)
RBC # FLD: 17.4 % — HIGH (ref 10.3–14.5)
SODIUM SERPL-SCNC: 141 MMOL/L — SIGNIFICANT CHANGE UP (ref 135–145)
SPECIMEN SOURCE: SIGNIFICANT CHANGE UP
WBC # BLD: 11.58 K/UL — HIGH (ref 3.8–10.5)
WBC # BLD: 8.81 K/UL — SIGNIFICANT CHANGE UP (ref 3.8–10.5)
WBC # FLD AUTO: 11.58 K/UL — HIGH (ref 3.8–10.5)
WBC # FLD AUTO: 8.81 K/UL — SIGNIFICANT CHANGE UP (ref 3.8–10.5)

## 2022-10-19 PROCEDURE — 99233 SBSQ HOSP IP/OBS HIGH 50: CPT

## 2022-10-19 PROCEDURE — 71045 X-RAY EXAM CHEST 1 VIEW: CPT | Mod: 26

## 2022-10-19 RX ORDER — DEXTROSE 50 % IN WATER 50 %
15 SYRINGE (ML) INTRAVENOUS ONCE
Refills: 0 | Status: DISCONTINUED | OUTPATIENT
Start: 2022-10-19 | End: 2022-10-27

## 2022-10-19 RX ORDER — SODIUM ZIRCONIUM CYCLOSILICATE 10 G/10G
10 POWDER, FOR SUSPENSION ORAL ONCE
Refills: 0 | Status: COMPLETED | OUTPATIENT
Start: 2022-10-19 | End: 2022-10-19

## 2022-10-19 RX ORDER — SODIUM CHLORIDE 9 MG/ML
1000 INJECTION, SOLUTION INTRAVENOUS
Refills: 0 | Status: DISCONTINUED | OUTPATIENT
Start: 2022-10-19 | End: 2022-10-27

## 2022-10-19 RX ORDER — CHLORHEXIDINE GLUCONATE 213 G/1000ML
1 SOLUTION TOPICAL
Refills: 0 | Status: DISCONTINUED | OUTPATIENT
Start: 2022-10-19 | End: 2022-11-02

## 2022-10-19 RX ORDER — DEXMEDETOMIDINE HYDROCHLORIDE IN 0.9% SODIUM CHLORIDE 4 UG/ML
0.5 INJECTION INTRAVENOUS
Qty: 200 | Refills: 0 | Status: DISCONTINUED | OUTPATIENT
Start: 2022-10-19 | End: 2022-10-25

## 2022-10-19 RX ORDER — GLUCAGON INJECTION, SOLUTION 0.5 MG/.1ML
1 INJECTION, SOLUTION SUBCUTANEOUS ONCE
Refills: 0 | Status: DISCONTINUED | OUTPATIENT
Start: 2022-10-19 | End: 2022-10-27

## 2022-10-19 RX ORDER — DEXTROSE 50 % IN WATER 50 %
25 SYRINGE (ML) INTRAVENOUS ONCE
Refills: 0 | Status: DISCONTINUED | OUTPATIENT
Start: 2022-10-19 | End: 2022-10-27

## 2022-10-19 RX ORDER — DEXTROSE 50 % IN WATER 50 %
12.5 SYRINGE (ML) INTRAVENOUS ONCE
Refills: 0 | Status: DISCONTINUED | OUTPATIENT
Start: 2022-10-19 | End: 2022-10-27

## 2022-10-19 RX ORDER — INSULIN LISPRO 100/ML
VIAL (ML) SUBCUTANEOUS EVERY 6 HOURS
Refills: 0 | Status: DISCONTINUED | OUTPATIENT
Start: 2022-10-19 | End: 2022-10-20

## 2022-10-19 RX ADMIN — Medication 1 MILLIGRAM(S): at 02:24

## 2022-10-19 RX ADMIN — Medication 1 PUFF(S): at 14:53

## 2022-10-19 RX ADMIN — Medication 1 MILLIGRAM(S): at 11:04

## 2022-10-19 RX ADMIN — Medication 1 PUFF(S): at 07:35

## 2022-10-19 RX ADMIN — Medication 1: at 17:07

## 2022-10-19 RX ADMIN — Medication 1 MILLIGRAM(S): at 01:14

## 2022-10-19 RX ADMIN — Medication 3: at 11:27

## 2022-10-19 RX ADMIN — SODIUM CHLORIDE 80 MILLILITER(S): 9 INJECTION INTRAMUSCULAR; INTRAVENOUS; SUBCUTANEOUS at 05:42

## 2022-10-19 RX ADMIN — MEROPENEM 100 MILLIGRAM(S): 1 INJECTION INTRAVENOUS at 05:42

## 2022-10-19 RX ADMIN — SIMETHICONE 80 MILLIGRAM(S): 80 TABLET, CHEWABLE ORAL at 05:42

## 2022-10-19 RX ADMIN — HEPARIN SODIUM 5000 UNIT(S): 5000 INJECTION INTRAVENOUS; SUBCUTANEOUS at 17:06

## 2022-10-19 RX ADMIN — Medication 1 PUFF(S): at 20:17

## 2022-10-19 RX ADMIN — Medication 5: at 00:44

## 2022-10-19 RX ADMIN — ALBUTEROL 2 PUFF(S): 90 AEROSOL, METERED ORAL at 07:35

## 2022-10-19 RX ADMIN — ALBUTEROL 2 PUFF(S): 90 AEROSOL, METERED ORAL at 14:53

## 2022-10-19 RX ADMIN — CHLORHEXIDINE GLUCONATE 15 MILLILITER(S): 213 SOLUTION TOPICAL at 17:05

## 2022-10-19 RX ADMIN — HEPARIN SODIUM 5000 UNIT(S): 5000 INJECTION INTRAVENOUS; SUBCUTANEOUS at 05:16

## 2022-10-19 RX ADMIN — ALBUTEROL 2 PUFF(S): 90 AEROSOL, METERED ORAL at 20:18

## 2022-10-19 RX ADMIN — CHLORHEXIDINE GLUCONATE 15 MILLILITER(S): 213 SOLUTION TOPICAL at 05:42

## 2022-10-19 RX ADMIN — Medication 1: at 23:10

## 2022-10-19 RX ADMIN — ALBUTEROL 2 PUFF(S): 90 AEROSOL, METERED ORAL at 01:54

## 2022-10-19 RX ADMIN — SIMETHICONE 80 MILLIGRAM(S): 80 TABLET, CHEWABLE ORAL at 17:06

## 2022-10-19 RX ADMIN — SODIUM ZIRCONIUM CYCLOSILICATE 10 GRAM(S): 10 POWDER, FOR SUSPENSION ORAL at 11:04

## 2022-10-19 RX ADMIN — CHLORHEXIDINE GLUCONATE 1 APPLICATION(S): 213 SOLUTION TOPICAL at 17:05

## 2022-10-19 RX ADMIN — Medication 1 MILLIGRAM(S): at 21:38

## 2022-10-19 RX ADMIN — Medication 4: at 05:43

## 2022-10-19 RX ADMIN — Medication 1 MILLIGRAM(S): at 05:42

## 2022-10-19 RX ADMIN — Medication 1 PUFF(S): at 01:54

## 2022-10-19 RX ADMIN — PANTOPRAZOLE SODIUM 40 MILLIGRAM(S): 20 TABLET, DELAYED RELEASE ORAL at 06:09

## 2022-10-19 NOTE — PROGRESS NOTE ADULT - ASSESSMENT
REVIEW OF SYMPTOMS      Able to give (reliable) ROS  NO     PHYSICAL EXAM    HEENT Unremarkable  atraumatic   RESP Fair air entry EXP prolonged    Harsh breath sound Resp distres mild   CARDIAC S1 S2 No S3     NO JVD    ABDOMEN SOFT BS PRESENT NOT DISTENDED No hepatosplenomegaly   PEDAL EDEMA present No calf tenderness  NO rash       AGE/SEX.   76 f  DOA.  10/18/2022  CC .  10/18/2022 resp distress  PRESENTING PROBLEMS .   RESP DISTRESS 10/18/2022  VAP 10/18/2022   ANEMIA 10/18/2022   HYPONATREMIA 10/18/2022   HYPERKALEMIA 10/18/2022   RYAN 10/18/2022   COURSE.     PAST ID ISSUES   8/19/2022  zosyn Se Vignesh  8/19/2022 bactrim ds 2 bid   pmh 5/2/2022 SERRATIA CARBAPENEM RESISTANT PSEUDOMONAS   PMH .  pmh Trach  pmh peg  pmh NO idf  pmh Pneumonia    pmh HTN,   pmh DM,   pmh CVA,   pmh  chronic respiratory failure   pmh s/p trach, PEG,   pmh cardiac arrest  pmh pulm hytn echo 8/19/2022 n lvsf dd1 pasp 58    pmh Pl effsn  r PL EFFSN   6/8/2022 r thorac g 161 l 222 p 4.9 p 10 l 16 .38    l pl effsn  5/25/2022 g 183 l 144/381 .37 p 3.4/5.3 .64 p 23 l 36   5/25/2022l pl fluid  lymph pred exudate   cyto (-)           GENERAL DATA .   GOC.  10/18/2022 full code       ALLGY. codeine                            WT.          10/18/2022 48  BMI.          10/18/2022 17                     ICU STAY. 10/18/2022  COVID.  10/18/2022 scv2 (-)     BEST PRACTICE ISSUES.    HOB ELEVATN. Yes  DVT PPLX.   10/18/2022 hpsc    SQUIRES PPLX.  10/18/2022 protonix 40     INFN PPLX.    10/18/2022 ch;lorhexidine .12%  10/19/2022 chlorhexidine 2%      VS/ PO/IO/ VENT/ DRIPS.  10/19/2022 afeb 87 110/80   10/19/2022 ac 18/400/5/.5      ASSESSMENT/RECOMMENDATIONS .   RESP.  -- Gas exchange.   10/18/2022 ac 18/400/100/5 751/37/257  -- VENT MANAGEMENT.   HOB elevation  Target Pplat 30 (-)  Target PO 90-95%  Target pH 730 (+)  Daily spontaneous breathing trials   Daily sedation vacation   -- Pleuralk effsn  Past ritchie   R THORAC   6/8/2022  g 161 l 222 p 4.9 p 10 l 16 .38    L THORAC   5/25/2022 g 183 l 144/381 .37 p 3.4/5.3 .64 p 23 l 36   5/25/2022l pl fluid  lymph pred exudate   cyto (-)     Ct ch 10/18/2022 sm r mod l effs large subpulmonic component   a/r No thoracentesis plannd at this point risk prohibitive in vent pt   -- Mediastinal lne.  Ct  10/18/2022 again enlarged mediastinal lns likely reactive   a/r will need followup with ct in 2m and consider biopsy if persists   -- wheeze.  10/18 albuterol hf  10/18 atrovent hf   INFECTION.  PAST ID ISSUES   8/19/2022  zosyn Se Vignesh  8/19/2022 bactrim ds 2 bid   pmh 5/2/2022 SERRATIA CARBAPENEM RESISTANT PSEUDOMONAS  -- VAP 10/18/2022.  -- UTI 10/18/2022   w 10/18-10/19/2022 w 13 - 8.8   ua 10/18/2022 w 11-25   ct ch 10/18/2022 cw 8/18/20232 ggo post upper lobes with interlobular septal thickening infiltrate or atelectasis lower lobes l more than   sm r and sm to mod l effsn  rvp 10/18/2022 (-)   uc 10/18 100K E coli   bc 10/18 (-)   10/18/2022 meropenem Dr NEWTON   10/18/2022 ID Dr Brantley on case   10/19/2022 levaquin 750      CARDIAC.  -- Hypotension.  10/18/2022 88/48   echo 8/19/2022 n lvsf dd1 pasp 58   Target MAP 65 (+)   GI.  -- ELEVATED LFTS.  LFTS 10/18-10/19/2022   ap 140-131  ast   alt 21 - 103   monitor  HEMAT.  -- Anemia.  Hb 10/18-10/19/2022 Hb 7.4 -  6.3   inr 10/18/2022 inr 1.23   Monitor  target Hb 7 (+)   -- TRANSFUSION  10/19/2022 1 U PRBC   RENAL.  -- Hyponatremia.  Na 10/18-10/19/2022 Na 133 - 141  monitor correct  -- Hyperkalemia.  K 10/18-10/19/2022 K 5.6 - 5.8   10/19/2022 Na zirconium 10 g once given   monitor correct  -- RYAN.  Cr 10/18-10/19/2022 Cr 2.42-2.2   Cr 8/26/2022 Cr 1.4   Fluid renal us monitor  IV fl.  -- 10/18/2022 ns 70     TIME SPENT   Over 39 minutes aggregate critical care time spent on encounter; activities included   direct patient care, counseling and/or coordinating care reviewing notes, lab data/ imaging , discussion with multidisciplinary team/ patient  /family and explaining in detail risks, benefits, alternatives  of the recommendations     CHAPINCITO GUIDRY 76 f [facility] S 10/18/2022   DR ANG PADGETT

## 2022-10-19 NOTE — DIETITIAN INITIAL EVALUATION ADULT - REASON FOR ADMISSION
Per H&P "77yo F with PMH of dementia, COPD with chronic resp failure and is vent dependent, s/p PEG, hx of cardiac arrest, diastolic CHF, cor pulmonale, hx of CVA, COVID-19 infxn, CKD, anemia, multiple admissions for respiratory distress (recent admission from 6/1-6/9 diagnosed with PNA with parapneumonic pleural effusion s/p thoracentesis and Avycaz) who presents from [facility] for respiratory distress again a/w sepsis and respiratory failure due to suspected recurrent gram-negative PNA."

## 2022-10-19 NOTE — DIETITIAN INITIAL EVALUATION ADULT - ETIOLOGY
related to increased demands in setting of wound healing needs related to endocrine dysfunction vs RYAN

## 2022-10-19 NOTE — PROGRESS NOTE ADULT - ASSESSMENT
76F with dementia, COPD with chronic resp failure and is vent dependent, s/p PEG, hx of cardiac arrest, CHF, cor pulmonale, CVA, COVID-19 infxn, CKD, anemia, multiple admissions    dementia  COPD  chr resp failure  vegetative state  dysphagia  peg  hx of card arrest - anoxic brain  CHF  cva      Diagnosis; Prognosis; MOLST Discussed  Marciano -   HCP  pt is full code  spoke with  -

## 2022-10-19 NOTE — PROGRESS NOTE ADULT - ASSESSMENT
75yo F with PMH of dementia, COPD with chronic resp failure and is vent dependent, s/p PEG, hx of cardiac arrest, diastolic CHF, cor pulmonale, hx of CVA, COVID-19 infxn, CKD, anemia, multiple admissions for respiratory distress (recent admission from 6/1-6/9 diagnosed with PNA with parapneumonic pleural effusion s/p thoracentesis and Avycaz) who presents from [facility] for respiratory distress again a/w sepsis and respiratory failure due to suspected recurrent gram-negative PNA.    Severe sepsis and acute hypoxic respiratory failure 2/2 gram-negative PNA   - continue current vent settings (on AC with RR 16, , PEEP 5, FiO2 100%) maintain O2 sat >92%  - was on ertapenem, as per ID changed to zosyn+bactrim for coverage of prior admissions cultures of MDR Serratia and CRE Pseudomonas  - lactate downtrending, procalcitonin high but improving with treatment (4.76 -> 3.34 -> 1.77)  - cautious use of fluids given hx of CHF (received 1750cc of NS in ED)  - f/u blood cultures (NGTD), urine culture pending   - f/u trach sputum culture - has GNR growing awaiting speciation / sensitivities  - has hx of respiratory distress driven by vent asynchrony and would require IV benzos ; trialed IV fentanyl with adequate effect this evening  - Checked CT Abd/P to eval for any other potential source sign of infection and found no significant sign of intra-abdominal infection on CT  - cc/pulm consult (Jaime), recs appreciated  - palliative care (Emre), recs appreciated - pt is full code    Diastolic CHF  Hx of Pleural effusions  - last TTE was 5/30 with EF 65% with normal LVSF, dilated   RV, and moderate pHTN -> repeat TTE appears unchanged  - may need repeat thoracentesis (had 1.5L drained three admissions ago), pulmonology consulted; was parapneumonic on that admission as opposed to transudative and thus less likely related to CHF    hyperkalemia RYAN likely due to sepsis  will give bolus of fluids: careful use going forward.  repeat Cr and K    Anemia of chronic disease  - has been evaluated by GI and hematology in the past -> dx with ACD with intermittent GI bleeding  - s/p 1 unit yesterday, another unit ordered today 2/2 today's Hb being 6.3    Hyperkalemia  - given both insulin and lasix   still remains elevated  nutrition consulted    Hx of CKD stage 3 - near baseline of 1.2-1.3  - renally dose medications and monitor BMP and electrolytes   - Cr is 1.3 with eGFR of 41, but given pt has low muscle mass from being bedbound for years, this GFR estimate is likely falsely high  - consider nephro eval   nutrition consulted    HTN  - not on any BP meds at facility  - monitor VS    DM2  - NPO with TF  - continue with insulin 8units qAM as per last admission  - c/w moderate insulin coverage scale  - goal -180    Diet  - continue with same TF from last admission    Preventive measures  - cont with heparin subq for DVT ppx  - Full Code

## 2022-10-19 NOTE — DIETITIAN INITIAL EVALUATION ADULT - ORAL INTAKE PTA/DIET HISTORY
Pt vent dependent, s/p PEG, unable to patriciate in interview. Per transfer document, pt was NPO with tube feed of Glucerna 1.5 @60 ml/hr, total volume over 24hr is 1200 ml, 1800 kcal, 99 g protein, free water 250 ml q6hr (1000ml H2o); .2#.   Pt vent dependent, s/p PEG, unable to patriciate in interview. Pt known to RD, previous seen <2 months ago with dx of malnutrition. Per transfer document, pt was NPO with tube feed of Glucerna 1.5 @60 ml/hr, total volume over 24hr is 1200 ml, 1800 kcal, 99 g protein, free water 250 ml q6hr (1000ml H2o); .2#.

## 2022-10-19 NOTE — PROGRESS NOTE ADULT - SUBJECTIVE AND OBJECTIVE BOX
[facility], Division of Infectious Diseases  MO[facility] Lora Y. Patel, S. Shah, G[facility]  [phone]    Name: BREA BECKHAM  Age: 76y  Gender: Female  MRN: [id number]    Interval History:  Patient seen and examined at bedside  No acute overnight events. Afebrile  Notes reviewed    Antibiotics:  meropenem  IVPB 500 milliGRAM(s) IV Intermittent every 12 hours      Medications:  acetaminophen     Tablet .. 650 milliGRAM(s) Oral every 6 hours PRN  ALBUTerol    90 MICROgram(s) HFA Inhaler 2 Puff(s) Inhalation every 6 hours  aluminum hydroxide/magnesium hydroxide/simethicone Suspension 30 milliLiter(s) Oral every 4 hours PRN  chlorhexidine 0.12% Liquid 15 milliLiter(s) Oral Mucosa every 12 hours  dextrose 5%. 1000 milliLiter(s) IV Continuous <Continuous>  dextrose 5%. 1000 milliLiter(s) IV Continuous <Continuous>  dextrose 50% Injectable 25 Gram(s) IV Push once  dextrose 50% Injectable 12.5 Gram(s) IV Push once  dextrose 50% Injectable 25 Gram(s) IV Push once  dextrose Oral Gel 15 Gram(s) Oral once PRN  glucagon  Injectable 1 milliGRAM(s) IntraMuscular once  heparin   Injectable 5000 Unit(s) SubCutaneous every 12 hours  insulin lispro (ADMELOG) corrective regimen sliding scale   SubCutaneous every 6 hours  ipratropium 17 MICROgram(s) HFA Inhaler 1 Puff(s) Inhalation every 6 hours  LORazepam     Tablet 1 milliGRAM(s) Oral every 4 hours PRN  melatonin 3 milliGRAM(s) Oral at bedtime PRN  meropenem  IVPB 500 milliGRAM(s) IV Intermittent every 12 hours  ondansetron Injectable 4 milliGRAM(s) IV Push every 8 hours PRN  pantoprazole    Tablet 40 milliGRAM(s) Oral before breakfast  simethicone 80 milliGRAM(s) Chew every 12 hours  sodium chloride 0.9%. 1000 milliLiter(s) IV Continuous <Continuous>      Review of Systems:  unable to obtain    Allergies: codeine (Hives)    For details regarding the patient's past medical history, social history, family history, and other miscellaneous elements, please refer the initial infectious diseases consultation and/or the admitting history and physical examination for this admission.    Objective:  Vitals:   T(C): 36.4 (10-19-22 @ 13:00), Max: 37.5 (10-19-22 @ 01:31)  HR: 87 (10-19-22 @ 12:00) (81 - 113)  BP: 109/67 (10-19-22 @ 12:00) (73/43 - 159/59)  RR: 26 (10-19-22 @ 12:00) (9 - 42)  SpO2: 93% (10-19-22 @ 12:00) (88% - 100%)    Physical Examination:  General: NAD  HEENT: NC/AT, EOMI,   Neck: trach to vent  Lungs: MV breath sounds  Heart: Regular rate and rhythm. No murmur, rub or gallop.  Abdomen: Soft. Nondistended. Nontender. B  Skin: Warm. Dry.    Laboratory Studies:  CBC:                       6.3    8.81  )-----------( 115      ( 19 Oct 2022 06:32 )             21.0     CMP: 10-19    141  |  103  |  102<H>  ----------------------------<  306<H>  5.8<H>   |  22  |  2.24<H>    Ca    7.5<L>      19 Oct 2022 06:32  Phos  4.9     10-19  Mg     3.0     10-19    TPro  6.2  /  Alb  1.7<L>  /  TBili  0.5  /  DBili  x   /  AST  179<H>  /  ALT  103<H>  /  AlkPhos  131<H>  10-19    LIVER FUNCTIONS - ( 19 Oct 2022 06:32 )  Alb: 1.7 g/dL / Pro: 6.2 g/dL / ALK PHOS: 131 U/L / ALT: 103 U/L DA / AST: 179 U/L / GGT: x           Urinalysis Basic - ( 18 Oct 2022 12:27 )    Color: Yellow / Appearance: Clear / S.015 / pH: x  Gluc: x / Ketone: Negative  / Bili: Negative / Urobili: Negative mg/dL   Blood: x / Protein: 15 mg/dL / Nitrite: Negative   Leuk Esterase: Moderate / RBC: 0-2 /HPF / WBC 11-25   Sq Epi: x / Non Sq Epi: Occasional / Bacteria: Many        Microbiology: reviewed        Radiology: reviewed    < from: CT Chest No Cont (10.18.22 @ 13:13) >    ACC: [id number] EXAM:  CT CHEST                          PROCEDURE DATE:  10/18/2022          INTERPRETATION:  CLINICAL INFORMATION: Hypoxia    COMPARISON: 2022    CONTRAST/COMPLICATIONS:  IV Contrast: NONE  Oral Contrast: NONE  Complications: None reported at time of study completion    PROCEDURE:  CT of the Chest was performed.  Sagittal and coronal reformats were performed.    FINDINGS:    LUNGS AND AIRWAYS: Patent central airways.  Lungs are remarkable for   groundglass opacities in the posterior upper lobes with interlobular   septal thickening. Infiltrate or atelectasis is seen in the lower lobes   left more than right. Patchy densities are also seen in the posterior   upper lobes. Tracheostomy is again appreciated  PLEURA: Small right and small to moderate left pleural effusion with a   large subpulmonic component.  MEDIASTINUM AND JHONNY: Limited for evaluation of adenopathy without IV   contrast. However there are again enlarged mediastinal lymph nodes likely   reactive. The upper esophagus is air filled  VESSELS: Atherosclerotic calcification of the aorta and coronary arteries.  HEART: Heart size is normal. No pericardial effusion.  CHEST WALL AND LOWER NECK: Within normal limits.  VISUALIZED UPPER ABDOMEN: PEG tube is seen. Adrenal glands are thickened.  BONES: Degenerative changes. Again seen is compression fracture of the L3   vertebral body    IMPRESSION: Groundglass opacities and interlobular septal thickening   suggestive of CHF. There are also some more confluent areas of density   which may represent atelectasis versus infiltrates.  Bilateral pleural effusions have decreased from the prior exam  Tracheostomy and gastrostomy    --- End of Report ---            MINOR STEWART MD; Attending Radiologist  This document has been electronically signed. Oct 18 2022  2:45PM    < end of copied text >

## 2022-10-19 NOTE — PROGRESS NOTE ADULT - SUBJECTIVE AND OBJECTIVE BOX
ICU Progress Note    HPI:    S:    Pt seen and examined  HD #  Pt here for      Allergies    codeine (Hives)    Intolerances        MEDICATIONS  (STANDING):  ALBUTerol    90 MICROgram(s) HFA Inhaler 2 Puff(s) Inhalation every 6 hours  chlorhexidine 0.12% Liquid 15 milliLiter(s) Oral Mucosa every 12 hours  chlorhexidine 2% Cloths 1 Application(s) Topical two times a day  dexMEDEtomidine Infusion 0.5 MICROgram(s)/kG/Hr (6.14 mL/Hr) IV Continuous <Continuous>  dextrose 5%. 1000 milliLiter(s) (100 mL/Hr) IV Continuous <Continuous>  dextrose 5%. 1000 milliLiter(s) (50 mL/Hr) IV Continuous <Continuous>  dextrose 50% Injectable 25 Gram(s) IV Push once  dextrose 50% Injectable 12.5 Gram(s) IV Push once  dextrose 50% Injectable 25 Gram(s) IV Push once  glucagon  Injectable 1 milliGRAM(s) IntraMuscular once  heparin   Injectable 5000 Unit(s) SubCutaneous every 12 hours  insulin lispro (ADMELOG) corrective regimen sliding scale   SubCutaneous every 6 hours  ipratropium 17 MICROgram(s) HFA Inhaler 1 Puff(s) Inhalation every 6 hours  pantoprazole    Tablet 40 milliGRAM(s) Oral before breakfast  simethicone 80 milliGRAM(s) Chew every 12 hours  sodium chloride 0.9%. 1000 milliLiter(s) (80 mL/Hr) IV Continuous <Continuous>    MEDICATIONS  (PRN):  acetaminophen     Tablet .. 650 milliGRAM(s) Oral every 6 hours PRN Temp greater or equal to 38C (100.4F), Mild Pain (1 - 3)  aluminum hydroxide/magnesium hydroxide/simethicone Suspension 30 milliLiter(s) Oral every 4 hours PRN Dyspepsia  dextrose Oral Gel 15 Gram(s) Oral once PRN Blood Glucose LESS THAN 70 milliGRAM(s)/deciliter  LORazepam     Tablet 1 milliGRAM(s) Oral every 4 hours PRN Anxiety  melatonin 3 milliGRAM(s) Oral at bedtime PRN Insomnia  ondansetron Injectable 4 milliGRAM(s) IV Push every 8 hours PRN Nausea and/or Vomiting      Drug Dosing Weight  Height (cm): 165.1 (18 Oct 2022 11:15)  Weight (kg): 49.1 (18 Oct 2022 15:00)  BMI (kg/m2): 18 (18 Oct 2022 15:00)  BSA (m2): 1.52 (18 Oct 2022 15:00)    PAST MEDICAL & SURGICAL HISTORY:  Dementia of frontal lobe type      Aphasic stroke      Diabetes mellitus      Respiratory failure      Hypertension      GERD (gastroesophageal reflux disease)      Constipation      Respiratory failure      CVA (cerebral vascular accident)      HTN (hypertension)      DM (diabetes mellitus)      Advanced dementia      COVID-19 virus detected      Quadriplegia      Pneumonia      Type II diabetes mellitus      Hx of appendectomy      Gastrostomy in place      Tracheostomy in place      Tracheostomy tube present      Feeding by G-tube          FAMILY HISTORY:      ROS: See HPI; otherwise, all systems reviewed and negative.    O:    ICU Vital Signs Last 24 Hrs  T(C): 36.2 (19 Oct 2022 16:06), Max: 37.5 (19 Oct 2022 01:31)  T(F): 97.2 (19 Oct 2022 16:06), Max: 99.5 (19 Oct 2022 01:31)  HR: 83 (19 Oct 2022 19:00) (81 - 102)  BP: 96/71 (19 Oct 2022 19:00) (73/43 - 149/58)  BP(mean): 80 (19 Oct 2022 19:00) (53 - 90)  ABP: --  ABP(mean): --  RR: 23 (19 Oct 2022 19:00) (9 - 42)  SpO2: 100% (19 Oct 2022 19:00) (89% - 100%)    O2 Parameters below as of 19 Oct 2022 19:00  Patient On (Oxygen Delivery Method): ventilator    O2 Concentration (%): 60        ABG - ( 18 Oct 2022 11:40 )  pH, Arterial: 7.51  pH, Blood: x     /  pCO2: 37    /  pO2: 257   / HCO3: 30    / Base Excess: 6.5   /  SaO2: 99.8                I&O's Detail    18 Oct 2022 07:01  -  19 Oct 2022 07:00  --------------------------------------------------------  IN:    IV PiggyBack: 50 mL    PRBCs (Packed Red Blood Cells): 330 mL    sodium chloride 0.9%: 1040 mL    Sodium Chloride 0.9% Bolus: 2000 mL  Total IN: 3420 mL    OUT:    Indwelling Catheter - Urethral (mL): 850 mL  Total OUT: 850 mL    Total NET: 2570 mL      19 Oct 2022 07:01  -  19 Oct 2022 20:44  --------------------------------------------------------  IN:    PRBCs (Packed Red Blood Cells): 273 mL    sodium chloride 0.9%: 640 mL  Total IN: 913 mL    OUT:    Indwelling Catheter - Urethral (mL): 625 mL  Total OUT: 625 mL    Total NET: 288 mL          Mode: AC/ CMV (Assist Control/ Continuous Mandatory Ventilation)  RR (machine): 18  TV (machine): 400  FiO2: 60  PEEP: 5  ITime: 1  MAP: 12  PIP: 33      PE:    Constitutional: Healthy M lying in bed in NAD.   Neck: No JVD, trachea midline.   Respiratory: CTA B/L good BS B/L no W/R/R.  Cardiovascular: S1S2+ RRR no M/R/G.  Gastrointestinal: Soft, NTND.  Extremities: No peripheral edema, No cyanosis, clubbing.  Neurological: Awake, conversive, alert, no gross deficits.  Skin: No rashes, warm, moist.    LABS:    CBC Full  -  ( 19 Oct 2022 06:32 )  WBC Count : 8.81 K/uL  RBC Count : 2.39 M/uL  Hemoglobin : 6.3 g/dL  Hematocrit : 21.0 %  Platelet Count - Automated : 115 K/uL  Mean Cell Volume : 87.9 fl  Mean Cell Hemoglobin : 26.4 pg  Mean Cell Hemoglobin Concentration : 30.0 gm/dL  Auto Neutrophil # : 7.07 K/uL  Auto Lymphocyte # : 1.04 K/uL  Auto Monocyte # : 0.42 K/uL  Auto Eosinophil # : 0.06 K/uL  Auto Basophil # : 0.01 K/uL  Auto Neutrophil % : 80.2 %  Auto Lymphocyte % : 11.8 %  Auto Monocyte % : 4.8 %  Auto Eosinophil % : 0.7 %  Auto Basophil % : 0.1 %    10-19    141  |  103  |  102<H>  ----------------------------<  306<H>  5.8<H>   |  22  |  2.24<H>    Ca    7.5<L>      19 Oct 2022 06:32  Phos  4.9     10-19  Mg     3.0     10-19    TPro  6.2  /  Alb  1.7<L>  /  TBili  0.5  /  DBili  x   /  AST  179<H>  /  ALT  103<H>  /  AlkPhos  131<H>  10-19    PT/INR - ( 18 Oct 2022 12:00 )   PT: 14.2 sec;   INR: 1.23 ratio         PTT - ( 18 Oct 2022 12:00 )  PTT:38.0 sec  Urinalysis Basic - ( 18 Oct 2022 12:27 )    Color: Yellow / Appearance: Clear / S.015 / pH: x  Gluc: x / Ketone: Negative  / Bili: Negative / Urobili: Negative mg/dL   Blood: x / Protein: 15 mg/dL / Nitrite: Negative   Leuk Esterase: Moderate / RBC: 0-2 /HPF / WBC 11-25   Sq Epi: x / Non Sq Epi: Occasional / Bacteria: Many      CAPILLARY BLOOD GLUCOSE      POCT Blood Glucose.: 197 mg/dL (19 Oct 2022 16:22)  POCT Blood Glucose.: 261 mg/dL (19 Oct 2022 11:19)  POCT Blood Glucose.: 314 mg/dL (19 Oct 2022 05:37)  POCT Blood Glucose.: 371 mg/dL (19 Oct 2022 00:12)        LIVER FUNCTIONS - ( 19 Oct 2022 06:32 )  Alb: 1.7 g/dL / Pro: 6.2 g/dL / ALK PHOS: 131 U/L / ALT: 103 U/L DA / AST: 179 U/L / GGT: x               A:    76yFemale  HD #    Here for:    1.    P:    Neuro: GCS 15. Monitor for delirium.  Continue to optimize pain control. Serial Neurologic assessments.    HEENT: No issues.    CV: Continue hemodynamic monitoring    Pulm: Pulmonary toilet.  Continue incentive spirometer.  Chest PT.  Encourage OOB to chair and ambulation. Nebs. f/u ABG, CXR.    GI/Nutrition: Cont diet, bowel regimen.    /Renal: Monitor UOP. Monitor BMP.  Replete Lytes as needed.    HEME- Chemical and mechanical DVT ppx. f/u CBC. f/u coags as needed.    ID:  Cont abx. f/u Cx's.    Lines/Tubes:     Endo: Maintain euglycemia.    Skin:  Cont skin care, pressure ulcer prevention.    Dispo: Cont care.       ICU Progress Note    S: Patient due to get CT AP tonight to eval for possible source of bleeding. She is become acutely hypoxemic with agitation. We are going to hold CT scan for now since she is hemonyamically stable and evaluate / optimize the acute hypoxemia and agitation.     Allergies    codeine (Hives)    Intolerances        MEDICATIONS  (STANDING):  ALBUTerol    90 MICROgram(s) HFA Inhaler 2 Puff(s) Inhalation every 6 hours  chlorhexidine 0.12% Liquid 15 milliLiter(s) Oral Mucosa every 12 hours  chlorhexidine 2% Cloths 1 Application(s) Topical two times a day  dexMEDEtomidine Infusion 0.5 MICROgram(s)/kG/Hr (6.14 mL/Hr) IV Continuous <Continuous>  dextrose 5%. 1000 milliLiter(s) (100 mL/Hr) IV Continuous <Continuous>  dextrose 5%. 1000 milliLiter(s) (50 mL/Hr) IV Continuous <Continuous>  dextrose 50% Injectable 25 Gram(s) IV Push once  dextrose 50% Injectable 12.5 Gram(s) IV Push once  dextrose 50% Injectable 25 Gram(s) IV Push once  glucagon  Injectable 1 milliGRAM(s) IntraMuscular once  heparin   Injectable 5000 Unit(s) SubCutaneous every 12 hours  insulin lispro (ADMELOG) corrective regimen sliding scale   SubCutaneous every 6 hours  ipratropium 17 MICROgram(s) HFA Inhaler 1 Puff(s) Inhalation every 6 hours  pantoprazole    Tablet 40 milliGRAM(s) Oral before breakfast  simethicone 80 milliGRAM(s) Chew every 12 hours  sodium chloride 0.9%. 1000 milliLiter(s) (80 mL/Hr) IV Continuous <Continuous>    MEDICATIONS  (PRN):  acetaminophen     Tablet .. 650 milliGRAM(s) Oral every 6 hours PRN Temp greater or equal to 38C (100.4F), Mild Pain (1 - 3)  aluminum hydroxide/magnesium hydroxide/simethicone Suspension 30 milliLiter(s) Oral every 4 hours PRN Dyspepsia  dextrose Oral Gel 15 Gram(s) Oral once PRN Blood Glucose LESS THAN 70 milliGRAM(s)/deciliter  LORazepam     Tablet 1 milliGRAM(s) Oral every 4 hours PRN Anxiety  melatonin 3 milliGRAM(s) Oral at bedtime PRN Insomnia  ondansetron Injectable 4 milliGRAM(s) IV Push every 8 hours PRN Nausea and/or Vomiting      Drug Dosing Weight  Height (cm): 165.1 (18 Oct 2022 11:15)  Weight (kg): 49.1 (18 Oct 2022 15:00)  BMI (kg/m2): 18 (18 Oct 2022 15:00)  BSA (m2): 1.52 (18 Oct 2022 15:00)    PAST MEDICAL & SURGICAL HISTORY:  Dementia of frontal lobe type      Aphasic stroke      Diabetes mellitus      Respiratory failure      Hypertension      GERD (gastroesophageal reflux disease)      Constipation      Respiratory failure      CVA (cerebral vascular accident)      HTN (hypertension)      DM (diabetes mellitus)      Advanced dementia      COVID-19 virus detected      Quadriplegia      Pneumonia      Type II diabetes mellitus      Hx of appendectomy      Gastrostomy in place      Tracheostomy in place      Tracheostomy tube present      Feeding by G-tube          FAMILY HISTORY:      ROS: See HPI; otherwise, all systems reviewed and negative.    O:    ICU Vital Signs Last 24 Hrs  T(C): 36.2 (19 Oct 2022 16:06), Max: 37.5 (19 Oct 2022 01:31)  T(F): 97.2 (19 Oct 2022 16:06), Max: 99.5 (19 Oct 2022 01:31)  HR: 83 (19 Oct 2022 19:00) (81 - 102)  BP: 96/71 (19 Oct 2022 19:00) (73/43 - 149/58)  BP(mean): 80 (19 Oct 2022 19:00) (53 - 90)  ABP: --  ABP(mean): --  RR: 23 (19 Oct 2022 19:00) (9 - 42)  SpO2: 100% (19 Oct 2022 19:00) (89% - 100%)    O2 Parameters below as of 19 Oct 2022 19:00  Patient On (Oxygen Delivery Method): ventilator    O2 Concentration (%): 60        ABG - ( 18 Oct 2022 11:40 )  pH, Arterial: 7.51  pH, Blood: x     /  pCO2: 37    /  pO2: 257   / HCO3: 30    / Base Excess: 6.5   /  SaO2: 99.8                I&O's Detail    18 Oct 2022 07:01  -  19 Oct 2022 07:00  --------------------------------------------------------  IN:    IV PiggyBack: 50 mL    PRBCs (Packed Red Blood Cells): 330 mL    sodium chloride 0.9%: 1040 mL    Sodium Chloride 0.9% Bolus: 2000 mL  Total IN: 3420 mL    OUT:    Indwelling Catheter - Urethral (mL): 850 mL  Total OUT: 850 mL    Total NET: 2570 mL      19 Oct 2022 07:01  -  19 Oct 2022 20:44  --------------------------------------------------------  IN:    PRBCs (Packed Red Blood Cells): 273 mL    sodium chloride 0.9%: 640 mL  Total IN: 913 mL    OUT:    Indwelling Catheter - Urethral (mL): 625 mL  Total OUT: 625 mL    Total NET: 288 mL          Mode: AC/ CMV (Assist Control/ Continuous Mandatory Ventilation)  RR (machine): 18  TV (machine): 400  FiO2: 60  PEEP: 5  ITime: 1  MAP: 12  PIP: 33    LABS:    CBC Full  -  ( 19 Oct 2022 06:32 )  WBC Count : 8.81 K/uL  RBC Count : 2.39 M/uL  Hemoglobin : 6.3 g/dL  Hematocrit : 21.0 %  Platelet Count - Automated : 115 K/uL  Mean Cell Volume : 87.9 fl  Mean Cell Hemoglobin : 26.4 pg  Mean Cell Hemoglobin Concentration : 30.0 gm/dL  Auto Neutrophil # : 7.07 K/uL  Auto Lymphocyte # : 1.04 K/uL  Auto Monocyte # : 0.42 K/uL  Auto Eosinophil # : 0.06 K/uL  Auto Basophil # : 0.01 K/uL  Auto Neutrophil % : 80.2 %  Auto Lymphocyte % : 11.8 %  Auto Monocyte % : 4.8 %  Auto Eosinophil % : 0.7 %  Auto Basophil % : 0.1 %    10-19    141  |  103  |  102<H>  ----------------------------<  306<H>  5.8<H>   |  22  |  2.24<H>    Ca    7.5<L>      19 Oct 2022 06:32  Phos  4.9     10-19  Mg     3.0     10-19    TPro  6.2  /  Alb  1.7<L>  /  TBili  0.5  /  DBili  x   /  AST  179<H>  /  ALT  103<H>  /  AlkPhos  131<H>  10-    PT/INR - ( 18 Oct 2022 12:00 )   PT: 14.2 sec;   INR: 1.23 ratio         PTT - ( 18 Oct 2022 12:00 )  PTT:38.0 sec  Urinalysis Basic - ( 18 Oct 2022 12:27 )    Color: Yellow / Appearance: Clear / S.015 / pH: x  Gluc: x / Ketone: Negative  / Bili: Negative / Urobili: Negative mg/dL   Blood: x / Protein: 15 mg/dL / Nitrite: Negative   Leuk Esterase: Moderate / RBC: 0-2 /HPF / WBC 11-25   Sq Epi: x / Non Sq Epi: Occasional / Bacteria: Many      CAPILLARY BLOOD GLUCOSE      POCT Blood Glucose.: 197 mg/dL (19 Oct 2022 16:22)  POCT Blood Glucose.: 261 mg/dL (19 Oct 2022 11:19)  POCT Blood Glucose.: 314 mg/dL (19 Oct 2022 05:37)  POCT Blood Glucose.: 371 mg/dL (19 Oct 2022 00:12)        LIVER FUNCTIONS - ( 19 Oct 2022 06:32 )  Alb: 1.7 g/dL / Pro: 6.2 g/dL / ALK PHOS: 131 U/L / ALT: 103 U/L DA / AST: 179 U/L / GGT: x               A:    76yFemale  HD #    Here for:    1. Acute on chronic hypoxemic resp failure  2. Acute and chronic anemia  3. RYAN with CKD  4. Hyperkalemia  5. DM  6. PNA  7. Sepsis    P:    -Neuro: Receiving ativan TID. Starting precedex for acute agitation. When she becomes agitated, she then is tachypneic and the vent dyssynchrony causes her to derecruit and become acutely hypoxemic.  -Cardiac: HD Stable  -Resp: Remains on assist control. Evaluating to change settings or mode to aid with aveolar recruitment and minimize hypoxia events. CT AP held tonight due to instability.    -GI: NPO, PPI  -Renal: Strict Is and Os. Renal Dose meds  -ID: cultures pending. presumed PNA and Sepsis  -Endo: glucose control goal FSG <180  -Heme:  received transfusion. Remains anemia. Concern for GI bleed for other source of bleeding. Pending CT when more stable.   -Dispo: Pt is full code and remains in the [facility]    I spend 32 minutes of critical care time managing this patients acute hypoxemia/acute respiratory failure and acute agitation rendering her unstable at the time for further testing. On top of these new acute events she also has acute renal failure and gi vs retroperitoneal bleed.

## 2022-10-19 NOTE — DIETITIAN INITIAL EVALUATION ADULT - PERTINENT MEDS FT
MEDICATIONS  (STANDING):  ALBUTerol    90 MICROgram(s) HFA Inhaler 2 Puff(s) Inhalation every 6 hours  chlorhexidine 0.12% Liquid 15 milliLiter(s) Oral Mucosa every 12 hours  dextrose 5%. 1000 milliLiter(s) (50 mL/Hr) IV Continuous <Continuous>  dextrose 5%. 1000 milliLiter(s) (100 mL/Hr) IV Continuous <Continuous>  dextrose 50% Injectable 25 Gram(s) IV Push once  dextrose 50% Injectable 12.5 Gram(s) IV Push once  dextrose 50% Injectable 25 Gram(s) IV Push once  glucagon  Injectable 1 milliGRAM(s) IntraMuscular once  heparin   Injectable 5000 Unit(s) SubCutaneous every 12 hours  insulin lispro (ADMELOG) corrective regimen sliding scale   SubCutaneous every 6 hours  ipratropium 17 MICROgram(s) HFA Inhaler 1 Puff(s) Inhalation every 6 hours  meropenem  IVPB 500 milliGRAM(s) IV Intermittent every 12 hours  pantoprazole    Tablet 40 milliGRAM(s) Oral before breakfast  simethicone 80 milliGRAM(s) Chew every 12 hours  sodium chloride 0.9%. 1000 milliLiter(s) (80 mL/Hr) IV Continuous <Continuous>    MEDICATIONS  (PRN):  acetaminophen     Tablet .. 650 milliGRAM(s) Oral every 6 hours PRN Temp greater or equal to 38C (100.4F), Mild Pain (1 - 3)  aluminum hydroxide/magnesium hydroxide/simethicone Suspension 30 milliLiter(s) Oral every 4 hours PRN Dyspepsia  dextrose Oral Gel 15 Gram(s) Oral once PRN Blood Glucose LESS THAN 70 milliGRAM(s)/deciliter  LORazepam     Tablet 1 milliGRAM(s) Oral every 4 hours PRN Anxiety  melatonin 3 milliGRAM(s) Oral at bedtime PRN Insomnia  ondansetron Injectable 4 milliGRAM(s) IV Push every 8 hours PRN Nausea and/or Vomiting

## 2022-10-19 NOTE — PROVIDER CONTACT NOTE (EICU) - BACKGROUND
77yo F with PMH of dementia, COPD with chronic resp failure and is vent dependent, s/p PEG, hx of cardiac arrest, diastolic CHF, cor pulmonale, hx of CVA, COVID-19 infxn, CKD, anemia, multiple admissions for respiratory distress (recent admission from 6/1-6/9 diagnosed with PNA with parapneumonic pleural effusion s/p thoracentesis and Avycaz) who presents w/ respiratory distress, sepsis/PNA

## 2022-10-19 NOTE — PROGRESS NOTE ADULT - SUBJECTIVE AND OBJECTIVE BOX
Patient is a 76y old  Female who presents with a chief complaint of Acute on Hypoxemic respiratory failure (19 Oct 2022 08:23)      INTERVAL HPI/OVERNIGHT EVENTS:    repeat hb was 6.3.  Unclear reason.     MEDICATIONS  (STANDING):  ALBUTerol    90 MICROgram(s) HFA Inhaler 2 Puff(s) Inhalation every 6 hours  chlorhexidine 0.12% Liquid 15 milliLiter(s) Oral Mucosa every 12 hours  dextrose 5%. 1000 milliLiter(s) (50 mL/Hr) IV Continuous <Continuous>  dextrose 5%. 1000 milliLiter(s) (100 mL/Hr) IV Continuous <Continuous>  dextrose 50% Injectable 25 Gram(s) IV Push once  dextrose 50% Injectable 12.5 Gram(s) IV Push once  dextrose 50% Injectable 25 Gram(s) IV Push once  glucagon  Injectable 1 milliGRAM(s) IntraMuscular once  heparin   Injectable 5000 Unit(s) SubCutaneous every 12 hours  insulin lispro (ADMELOG) corrective regimen sliding scale   SubCutaneous every 6 hours  ipratropium 17 MICROgram(s) HFA Inhaler 1 Puff(s) Inhalation every 6 hours  meropenem  IVPB 500 milliGRAM(s) IV Intermittent every 12 hours  pantoprazole    Tablet 40 milliGRAM(s) Oral before breakfast  simethicone 80 milliGRAM(s) Chew every 12 hours  sodium chloride 0.9%. 1000 milliLiter(s) (80 mL/Hr) IV Continuous <Continuous>    MEDICATIONS  (PRN):  acetaminophen     Tablet .. 650 milliGRAM(s) Oral every 6 hours PRN Temp greater or equal to 38C (100.4F), Mild Pain (1 - 3)  aluminum hydroxide/magnesium hydroxide/simethicone Suspension 30 milliLiter(s) Oral every 4 hours PRN Dyspepsia  dextrose Oral Gel 15 Gram(s) Oral once PRN Blood Glucose LESS THAN 70 milliGRAM(s)/deciliter  LORazepam     Tablet 1 milliGRAM(s) Oral every 4 hours PRN Anxiety  melatonin 3 milliGRAM(s) Oral at bedtime PRN Insomnia  ondansetron Injectable 4 milliGRAM(s) IV Push every 8 hours PRN Nausea and/or Vomiting      Allergies    codeine (Hives)    Intolerances        REVIEW OF SYSTEMS:  unable to obtain 2/2 nonverbal ventilated state    Vital Signs Last 24 Hrs  T(C): 36.6 (19 Oct 2022 12:00), Max: 37.5 (19 Oct 2022 01:31)  T(F): 97.9 (19 Oct 2022 12:00), Max: 99.5 (19 Oct 2022 01:31)  HR: 87 (19 Oct 2022 12:00) (81 - 113)  BP: 109/67 (19 Oct 2022 12:00) (73/43 - 159/59)  BP(mean): 80 (19 Oct 2022 12:00) (53 - 88)  RR: 26 (19 Oct 2022 12:00) (9 - 42)  SpO2: 93% (19 Oct 2022 12:00) (88% - 100%)    Parameters below as of 19 Oct 2022 12:00  Patient On (Oxygen Delivery Method): ventilator    O2 Concentration (%): 50    PHYSICAL EXAM:  General: NAD  HEENT: NC/AT, EOMI,   Neck: trach to vent  Lungs: MV breath sounds  Heart: Regular rate and rhythm. No murmur, rub or gallop.  Abdomen: Soft. Nondistended. Nontender.   Skin: Warm. Dry.    LABS:                        6.3    8.81  )-----------( 115      ( 19 Oct 2022 06:32 )             21.0     19 Oct 2022 06:32    141    |  103    |  102    ----------------------------<  306    5.8     |  22     |  2.24     Ca    7.5        19 Oct 2022 06:32  Phos  4.9       19 Oct 2022 06:32  Mg     3.0       19 Oct 2022 06:32    TPro  6.2    /  Alb  1.7    /  TBili  0.5    /  DBili  x      /  AST  179    /  ALT  103    /  AlkPhos  131    19 Oct 2022 06:32    PT/INR - ( 18 Oct 2022 12:00 )   PT: 14.2 sec;   INR: 1.23 ratio         PTT - ( 18 Oct 2022 12:00 )  PTT:38.0 sec  Urinalysis Basic - ( 18 Oct 2022 12:27 )    Color: Yellow / Appearance: Clear / S.015 / pH: x  Gluc: x / Ketone: Negative  / Bili: Negative / Urobili: Negative mg/dL   Blood: x / Protein: 15 mg/dL / Nitrite: Negative   Leuk Esterase: Moderate / RBC: 0-2 /HPF / WBC 11-25   Sq Epi: x / Non Sq Epi: Occasional / Bacteria: Many      CAPILLARY BLOOD GLUCOSE      POCT Blood Glucose.: 261 mg/dL (19 Oct 2022 11:19)  POCT Blood Glucose.: 314 mg/dL (19 Oct 2022 05:37)  POCT Blood Glucose.: 371 mg/dL (19 Oct 2022 00:12)      RADIOLOGY & ADDITIONAL TESTS:

## 2022-10-19 NOTE — DIETITIAN INITIAL EVALUATION ADULT - OTHER INFO
Visited patient in room, presently NPO, no GI issue reported, last BM 10/19. NKFA. Reported .2#, current adm weight 108.2#, weight appears to be stable, will continue to monitor weight trends as able. Per rounds today, to be resume feeds today. Pertinent medications/nutrition labs reviewed; recommend to change formula to Nepro given elevated potassium; also noted elevated glucose, -371, noted Nepro is carb steady formula, appropriate. A1c 7.3% (8/19/22), in house ordered for lispro sliding scale to aid in glycemic control; noted elevated BUN, CR, receiving IVF in house.     Education is not appropriate at this time. RD to continue to monitor nutrition status per protocol.  Visited patient in room, presently NPO, no GI issue reported, last BM 10/19. NKFA. Reported .2#, current adm weight 108.2#, weight appears to be stable x2 month, will continue to monitor weight trends as able. Per rounds today, to be resume feeds today. Pertinent medications/nutrition labs reviewed; recommend to change formula to Nepro given elevated potassium; also noted elevated glucose, -371, noted Nepro is carb steady formula, appropriate. A1c 7.3% (8/19/22), in house ordered for lispro sliding scale to aid in glycemic control; noted elevated BUN, CR, receiving IVF in house.     Education is not appropriate at this time. RD to continue to monitor nutrition status per protocol.

## 2022-10-19 NOTE — PROGRESS NOTE ADULT - SUBJECTIVE AND OBJECTIVE BOX
BREA BECKHAM     [facility]    Allergies    codeine (Hives)    Intolerances        PAST MEDICAL & SURGICAL HISTORY:  Dementia of frontal lobe type      Aphasic stroke      Diabetes mellitus      Respiratory failure      Hypertension      GERD (gastroesophageal reflux disease)      Constipation      Respiratory failure      CVA (cerebral vascular accident)      HTN (hypertension)      DM (diabetes mellitus)      Advanced dementia      COVID-19 virus detected      Quadriplegia      Pneumonia      Type II diabetes mellitus      Hx of appendectomy      Gastrostomy in place      Tracheostomy in place      Tracheostomy tube present      Feeding by G-tube          FAMILY HISTORY:      Home Medications:  albuterol 90 mcg/inh inhalation aerosol with adapter: 2  inhaled every 6 hours (18 Oct 2022 11:42)  Bacid (LAC) oral tablet: 2 tab(s) by gastrostomy tube once a day (18 Oct 2022 11:42)  Betadine 10% topical swab: cleanse right and left great toes (18 Oct 2022 11:42)  chlorhexidine 0.12% mucous membrane liquid: 15 milliliter(s) mucous membrane 2 times a day (18 Oct 2022 11:42)  Eucerin topical cream: Apply topically to affected area once a day bilateral feet (18 Oct 2022 11:42)  insulin glargine 100 units/mL subcutaneous solution: 8 unit(s) subcutaneous once a day (in the morning) (18 Oct 2022 11:42)  ipratropium-albuterol 0.5 mg-2.5 mg/3 mL inhalation solution: 3 milliliter(s) inhaled 4 times a day (18 Oct 2022 11:42)  LORazepam 1 mg oral tablet: 1 tab(s) by gastrostomy tube every 4 hours (18 Oct 2022 11:42)  methocarbamol 500 mg oral tablet: 1 tab(s) by gastrostomy tube 2 times a day (18 Oct 2022 11:42)  MiraLax oral powder for reconstitution: g-tube (18 Oct 2022 13:04)  Multiple Vitamins oral tablet: 1 tab(s) orally once a day (18 Oct 2022 11:42)  nystatin 100,000 units/g topical powder: 1 application topically 3 times a day (18 Oct 2022 11:42)  omeprazole 20 mg oral delayed release capsule: orally 2 times a day (18 Oct 2022 11:42)  polyethylene glycol 3350 oral powder for reconstitution: 17 gram(s) by gastrostomy tube every 12 hours (18 Oct 2022 11:42)  senna 8.6 mg oral tablet: 3 tab(s) by gastrostomy tube once a day (at bedtime) (18 Oct 2022 11:42)  simethicone 80 mg oral tablet: g-tube (18 Oct 2022 13:04)  simethicone 80 mg oral tablet, chewable: 1 tab(s) by gastrostomy tube every 6 hours (18 Oct 2022 11:42)  sucralfate 1 g/10 mL oral suspension: 10 milliliter(s) g-tube 4 times a day (before meals and at bedtime) (18 Oct 2022 13:04)  Tylenol 325 mg oral tablet: 2 tab(s) by gastrostomy tube once a day; 60 minutes prior to dressing change  (18 Oct 2022 11:42)  Tylenol 325 mg oral tablet: 2 tab(s) by gastrostomy tube every 6 hours, As Needed (18 Oct 2022 11:42)      MEDICATIONS  (STANDING):  ALBUTerol    90 MICROgram(s) HFA Inhaler 2 Puff(s) Inhalation every 6 hours  chlorhexidine 0.12% Liquid 15 milliLiter(s) Oral Mucosa every 12 hours  dextrose 5%. 1000 milliLiter(s) (50 mL/Hr) IV Continuous <Continuous>  dextrose 5%. 1000 milliLiter(s) (100 mL/Hr) IV Continuous <Continuous>  dextrose 50% Injectable 25 Gram(s) IV Push once  dextrose 50% Injectable 12.5 Gram(s) IV Push once  dextrose 50% Injectable 25 Gram(s) IV Push once  glucagon  Injectable 1 milliGRAM(s) IntraMuscular once  heparin   Injectable 5000 Unit(s) SubCutaneous every 12 hours  insulin lispro (ADMELOG) corrective regimen sliding scale   SubCutaneous every 6 hours  ipratropium 17 MICROgram(s) HFA Inhaler 1 Puff(s) Inhalation every 6 hours  meropenem  IVPB 500 milliGRAM(s) IV Intermittent every 12 hours  pantoprazole    Tablet 40 milliGRAM(s) Oral before breakfast  simethicone 80 milliGRAM(s) Chew every 12 hours  sodium chloride 0.9%. 1000 milliLiter(s) (80 mL/Hr) IV Continuous <Continuous>    MEDICATIONS  (PRN):  acetaminophen     Tablet .. 650 milliGRAM(s) Oral every 6 hours PRN Temp greater or equal to 38C (100.4F), Mild Pain (1 - 3)  aluminum hydroxide/magnesium hydroxide/simethicone Suspension 30 milliLiter(s) Oral every 4 hours PRN Dyspepsia  dextrose Oral Gel 15 Gram(s) Oral once PRN Blood Glucose LESS THAN 70 milliGRAM(s)/deciliter  LORazepam     Tablet 1 milliGRAM(s) Oral every 4 hours PRN Anxiety  melatonin 3 milliGRAM(s) Oral at bedtime PRN Insomnia  ondansetron Injectable 4 milliGRAM(s) IV Push every 8 hours PRN Nausea and/or Vomiting              Vital Signs Last 24 Hrs  T(C): 36.4 (19 Oct 2022 05:35), Max: 37.5 (19 Oct 2022 01:31)  T(F): 97.6 (19 Oct 2022 05:35), Max: 99.5 (19 Oct 2022 01:31)  HR: 86 (19 Oct 2022 07:35) (81 - 113)  BP: 94/58 (19 Oct 2022 07:00) (73/43 - 159/59)  BP(mean): 68 (19 Oct 2022 07:00) (53 - 88)  RR: 9 (19 Oct 2022 07:00) (9 - 42)  SpO2: 100% (19 Oct 2022 07:35) (88% - 100%)    Parameters below as of 19 Oct 2022 07:35  Patient On (Oxygen Delivery Method): ventilator          10-18-22 @ 07:01  -  10-19-22 @ 07:00  --------------------------------------------------------  IN: 3420 mL / OUT: 850 mL / NET: 2570 mL        Mode: AC/ CMV (Assist Control/ Continuous Mandatory Ventilation), RR (machine): 18, TV (machine): 400, FiO2: 60, PEEP: 5, ITime: 1, MAP: 14, PIP: 37      LABS:                        6.3    8.81  )-----------( 115      ( 19 Oct 2022 06:32 )             21.0     10-    141  |  103  |  102<H>  ----------------------------<  306<H>  5.8<H>   |  22  |  2.24<H>    Ca    7.5<L>      19 Oct 2022 06:32  Phos  4.9     10-19  Mg     3.0     10-19    TPro  6.2  /  Alb  1.7<L>  /  TBili  0.5  /  DBili  x   /  AST  179<H>  /  ALT  103<H>  /  AlkPhos  131<H>  10-19    PT/INR - ( 18 Oct 2022 12:00 )   PT: 14.2 sec;   INR: 1.23 ratio         PTT - ( 18 Oct 2022 12:00 )  PTT:38.0 sec  Urinalysis Basic - ( 18 Oct 2022 12:27 )    Color: Yellow / Appearance: Clear / S.015 / pH: x  Gluc: x / Ketone: Negative  / Bili: Negative / Urobili: Negative mg/dL   Blood: x / Protein: 15 mg/dL / Nitrite: Negative   Leuk Esterase: Moderate / RBC: 0-2 /HPF / WBC 11-25   Sq Epi: x / Non Sq Epi: Occasional / Bacteria: Many        ABG - ( 18 Oct 2022 11:40 )  pH, Arterial: 7.51  pH, Blood: x     /  pCO2: 37    /  pO2: 257   / HCO3: 30    / Base Excess: 6.5   /  SaO2: 99.8                WBC:  WBC Count: 8.81 K/uL (10-19 @ 06:32)  WBC Count: 13.01 K/uL (10-18 @ 11:30)      MICROBIOLOGY:  RECENT CULTURES:              PT/INR - ( 18 Oct 2022 12:00 )   PT: 14.2 sec;   INR: 1.23 ratio         PTT - ( 18 Oct 2022 12:00 )  PTT:38.0 sec    Sodium:  Sodium, Serum: 141 mmol/L (10-19 @ 06:32)  Sodium, Serum: 133 mmol/L (10-18 @ 11:30)      2.24 mg/dL 10-19 @ 06:32  2.42 mg/dL 10-18 @ 11:30      Hemoglobin:  Hemoglobin: 6.3 g/dL (10-19 @ 06:32)  Hemoglobin: 7.4 g/dL (10-18 @ 11:30)      Platelets: Platelet Count - Automated: 115 K/uL (10-19 @ 06:32)  Platelet Count - Automated: 168 K/uL (10-18 @ 11:30)      LIVER FUNCTIONS - ( 19 Oct 2022 06:32 )  Alb: 1.7 g/dL / Pro: 6.2 g/dL / ALK PHOS: 131 U/L / ALT: 103 U/L DA / AST: 179 U/L / GGT: x             Urinalysis Basic - ( 18 Oct 2022 12:27 )    Color: Yellow / Appearance: Clear / S.015 / pH: x  Gluc: x / Ketone: Negative  / Bili: Negative / Urobili: Negative mg/dL   Blood: x / Protein: 15 mg/dL / Nitrite: Negative   Leuk Esterase: Moderate / RBC: 0-2 /HPF / WBC 11-25   Sq Epi: x / Non Sq Epi: Occasional / Bacteria: Many        RADIOLOGY & ADDITIONAL STUDIES:      MICROBIOLOGY:  RECENT CULTURES:

## 2022-10-19 NOTE — PROGRESS NOTE ADULT - SUBJECTIVE AND OBJECTIVE BOX
Chief Complaint: Hypoxia    Interval Events: No significant changes.    Review of Systems:  Unable to obtain    Physical Exam:  Vitals:        Vital Signs Last 24 Hrs  T(C): 36.4 (19 Oct 2022 05:35), Max: 37.5 (19 Oct 2022 01:31)  T(F): 97.6 (19 Oct 2022 05:35), Max: 99.5 (19 Oct 2022 01:31)  HR: 92 (19 Oct 2022 09:00) (81 - 113)  BP: 104/57 (19 Oct 2022 09:00) (73/43 - 159/59)  BP(mean): 72 (19 Oct 2022 09:00) (53 - 88)  RR: 16 (19 Oct 2022 09:00) (9 - 42)  SpO2: 100% (19 Oct 2022 09:00) (88% - 100%)  Parameters below as of 19 Oct 2022 09:00  Patient On (Oxygen Delivery Method): ventilator  O2 Concentration (%): 60  General: Unresponsive  HEENT: Trach  Neck: No JVD, no carotid bruit  Lungs: CTAB  CV: RRR, nl S1/S2, no M/R/G  Abdomen: S/NT/ND, +BS  Extremities: No LE edema, no cyanosis  Neuro: AAOx0  Skin: No rash    Labs:                        6.3    8.81  )-----------( 115      ( 19 Oct 2022 06:32 )             21.0     10-19    141  |  103  |  102<H>  ----------------------------<  306<H>  5.8<H>   |  22  |  2.24<H>    Ca    7.5<L>      19 Oct 2022 06:32  Phos  4.9     10-19  Mg     3.0     10-19    TPro  6.2  /  Alb  1.7<L>  /  TBili  0.5  /  DBili  x   /  AST  179<H>  /  ALT  103<H>  /  AlkPhos  131<H>  10-19        PT/INR - ( 18 Oct 2022 12:00 )   PT: 14.2 sec;   INR: 1.23 ratio         PTT - ( 18 Oct 2022 12:00 )  PTT:38.0 sec    Telemetry: Sinus rhythm

## 2022-10-19 NOTE — DIETITIAN INITIAL EVALUATION ADULT - NSFNSPHYEXAMSKINFT_GEN_A_CORE
Pressure Injury 1: none, none  Pressure Injury 2: Left:,gluteal; fold, Stage IV  Pressure Injury 11: none, none, Pressure Injury 1: sacrum, Stage II

## 2022-10-19 NOTE — DIETITIAN INITIAL EVALUATION ADULT - ENTERAL
When medically feasible, recommend to initiate feeds of Nepro @20ml/hr, increase by 10ml q4hr till goal rate of . Feeds at goal rate will provide ml total volume, kcal, g protein, ml free water. Defer free water to team.  When medically feasible, recommend to initiate feeds of Nepro @20ml/hr, increase by 10ml q4hr till goal rate of 40hr x20hrs. Feeds at goal rate will provide 800 ml total volume, 1440 kcal, 65 g protein, 582 ml free water. Add Lucas (7 gm arginine/7 gm glutamine/1.5 gm hmb) x1 packet via PEG. 250ml free water flush q6hr to provide 1000ml H2O.

## 2022-10-19 NOTE — CHART NOTE - NSCHARTNOTEFT_GEN_A_CORE
patient repeat CBC showed Hb of 6.3, I am cross covering ICU this am, ordered 1 unit of PRBCs, patient hemodynamically stable

## 2022-10-19 NOTE — PROGRESS NOTE ADULT - ASSESSMENT
Pt is a 76W w/ PMHx of DM2, COPD, HTN, dementia, hx of subarachnoid hemorrhage, and history of chronic trach brought in by ambulance for evaluation of low CO2 and cyanotic appearance.   Well-known and has had multiple admissions for Acute on chronic RF and PNA w/ MDROs    Acute VAP/PNA  Acute on chronic HF  - pt p/w cyanosis  - most recent admission in end of August, most recent cx w/ Pseudomonas and Stenotrophomonas  - labs notable for leukocytosis to 13  - CT chest Groundglass opacities and interlobular septal thickening suggestive of CHF. There are also some more confluent areas of density   which may represent atelectasis versus infiltrates.Bilateral pleural effusions have decreased from the prior exam  - s/p levofloxacin in the ED  Plan:   F/u cx  D/c meropenem, pt w/ CRE (carbapenem-resistant organisms)  C/w levofloxacin 750mg q24h based on most recent cx  Will use above to guide additional therapy  Isolation per infection control policy given hx of CRE  Supportive care/vent management per [facility]    Infectious Diseases will continue to follow. Please call with any questions.   Andressa Brantley M.D.  [facility] of Infectious [facility] [phone]

## 2022-10-19 NOTE — PROGRESS NOTE ADULT - SUBJECTIVE AND OBJECTIVE BOX
Date/Time Patient Seen:  		  Referring MD:   Data Reviewed	       Patient is a 76y old  Female who presents with a chief complaint of Acute on Hypoxemic respiratory failure (18 Oct 2022 19:50)      Subjective/HPI     PAST MEDICAL & SURGICAL HISTORY:  Dementia of frontal lobe type    Aphasic stroke    Diabetes mellitus    Respiratory failure    Hypertension    GERD (gastroesophageal reflux disease)    Constipation    Respiratory failure    CVA (cerebral vascular accident)    HTN (hypertension)    DM (diabetes mellitus)    Advanced dementia    COVID-19 virus detected    Quadriplegia    Pneumonia    Type II diabetes mellitus    Hx of appendectomy    Gastrostomy in place    Tracheostomy in place    Tracheostomy tube present    Feeding by G-tube          Medication list         MEDICATIONS  (STANDING):  ALBUTerol    90 MICROgram(s) HFA Inhaler 2 Puff(s) Inhalation every 6 hours  chlorhexidine 0.12% Liquid 15 milliLiter(s) Oral Mucosa every 12 hours  dextrose 5%. 1000 milliLiter(s) (50 mL/Hr) IV Continuous <Continuous>  dextrose 5%. 1000 milliLiter(s) (100 mL/Hr) IV Continuous <Continuous>  dextrose 50% Injectable 25 Gram(s) IV Push once  dextrose 50% Injectable 12.5 Gram(s) IV Push once  dextrose 50% Injectable 25 Gram(s) IV Push once  glucagon  Injectable 1 milliGRAM(s) IntraMuscular once  heparin   Injectable 5000 Unit(s) SubCutaneous every 12 hours  insulin lispro (ADMELOG) corrective regimen sliding scale   SubCutaneous every 6 hours  ipratropium 17 MICROgram(s) HFA Inhaler 1 Puff(s) Inhalation every 6 hours  meropenem  IVPB 500 milliGRAM(s) IV Intermittent every 12 hours  pantoprazole    Tablet 40 milliGRAM(s) Oral before breakfast  simethicone 80 milliGRAM(s) Chew every 12 hours  sodium chloride 0.9%. 1000 milliLiter(s) (80 mL/Hr) IV Continuous <Continuous>    MEDICATIONS  (PRN):  acetaminophen     Tablet .. 650 milliGRAM(s) Oral every 6 hours PRN Temp greater or equal to 38C (100.4F), Mild Pain (1 - 3)  aluminum hydroxide/magnesium hydroxide/simethicone Suspension 30 milliLiter(s) Oral every 4 hours PRN Dyspepsia  dextrose Oral Gel 15 Gram(s) Oral once PRN Blood Glucose LESS THAN 70 milliGRAM(s)/deciliter  LORazepam     Tablet 1 milliGRAM(s) Oral every 4 hours PRN Anxiety  melatonin 3 milliGRAM(s) Oral at bedtime PRN Insomnia  ondansetron Injectable 4 milliGRAM(s) IV Push every 8 hours PRN Nausea and/or Vomiting         Vitals log        ICU Vital Signs Last 24 Hrs  T(C): 36.4 (19 Oct 2022 05:35), Max: 37.5 (19 Oct 2022 01:31)  T(F): 97.6 (19 Oct 2022 05:35), Max: 99.5 (19 Oct 2022 01:31)  HR: 84 (19 Oct 2022 05:56) (81 - 113)  BP: 103/65 (19 Oct 2022 05:00) (73/43 - 159/59)  BP(mean): 77 (19 Oct 2022 05:00) (53 - 88)  ABP: --  ABP(mean): --  RR: 17 (19 Oct 2022 05:00) (15 - 42)  SpO2: 99% (19 Oct 2022 05:56) (88% - 100%)    O2 Parameters below as of 18 Oct 2022 19:00  Patient On (Oxygen Delivery Method): ventilator    O2 Concentration (%): 60         Mode: AC/ CMV (Assist Control/ Continuous Mandatory Ventilation)  RR (machine): 18  TV (machine): 400  FiO2: 60  PEEP: 5  ITime: 1  MAP: 12  PIP: 37      Input and Output:  I&O's Detail    18 Oct 2022 07:01  -  19 Oct 2022 06:40  --------------------------------------------------------  IN:    IV PiggyBack: 50 mL    PRBCs (Packed Red Blood Cells): 330 mL    sodium chloride 0.9%: 1040 mL    Sodium Chloride 0.9% Bolus: 2000 mL  Total IN: 3420 mL    OUT:    Indwelling Catheter - Urethral (mL): 350 mL  Total OUT: 350 mL    Total NET: 3070 mL          Lab Data                        7.4    13.01 )-----------( 168      ( 18 Oct 2022 11:30 )             24.7     10-18    133<L>  |  95<L>  |  127<H>  ----------------------------<  412<H>  5.6<H>   |  32<H>  |  2.42<H>    Ca    8.8      18 Oct 2022 11:30    TPro  7.6  /  Alb  1.5<L>  /  TBili  0.4  /  DBili  x   /  AST  25  /  ALT  21  /  AlkPhos  140<H>  10-18    ABG - ( 18 Oct 2022 11:40 )  pH, Arterial: 7.51  pH, Blood: x     /  pCO2: 37    /  pO2: 257   / HCO3: 30    / Base Excess: 6.5   /  SaO2: 99.8                    Review of Systems	      Objective     Physical Examination    heart s1s2  lung dec bS  head nc      Pertinent Lab findings & Imaging      Annalise:  NO   Adequate UO     I&O's Detail    18 Oct 2022 07:01  -  19 Oct 2022 06:40  --------------------------------------------------------  IN:    IV PiggyBack: 50 mL    PRBCs (Packed Red Blood Cells): 330 mL    sodium chloride 0.9%: 1040 mL    Sodium Chloride 0.9% Bolus: 2000 mL  Total IN: 3420 mL    OUT:    Indwelling Catheter - Urethral (mL): 350 mL  Total OUT: 350 mL    Total NET: 3070 mL               Discussed with:     Cultures:	        Radiology

## 2022-10-19 NOTE — DIETITIAN INITIAL EVALUATION ADULT - PERTINENT LABORATORY DATA
10-19    141  |  103  |  102<H>  ----------------------------<  306<H>  5.8<H>   |  22  |  2.24<H>    Ca    7.5<L>      19 Oct 2022 06:32  Phos  4.9     10-19  Mg     3.0     10-19    TPro  6.2  /  Alb  1.7<L>  /  TBili  0.5  /  DBili  x   /  AST  179<H>  /  ALT  103<H>  /  AlkPhos  131<H>  10-19  POCT Blood Glucose.: 314 mg/dL (10-19-22 @ 05:37)  A1C with Estimated Average Glucose Result: 7.3 % (08-19-22 @ 06:36)  A1C with Estimated Average Glucose Result: 6.4 % (04-22-22 @ 12:14)  A1C with Estimated Average Glucose Result: 6.2 % (12-09-21 @ 14:24)

## 2022-10-19 NOTE — PROGRESS NOTE ADULT - ASSESSMENT
The patient is a 76 year old female with a history of HTN, DM, CVA, dementia, chronic respiratory failure s/p trach, PEG, cardiac arrest, anemia, pleural effusions who presents with hypoxia.     Plan:  - ECG with no evidence of ischemia or infarction  - Echo 8/22 with normal LV systolic function, mod pulm HTN, IVC small/collapsible  - CT chest with small bilateral pleural effusions, GGO, and infiltrates  - Pleural effusions previously were exudative, likely parapneumonic  - Hold furosemide  - Procalcitonin significantly elevated  - IV antibiotics  - Hemoglobin low - to be transfused

## 2022-10-20 LAB
ALBUMIN SERPL ELPH-MCNC: 1.6 G/DL — LOW (ref 3.3–5)
ALP SERPL-CCNC: 104 U/L — SIGNIFICANT CHANGE UP (ref 30–120)
ALT FLD-CCNC: 64 U/L DA — HIGH (ref 10–60)
ANION GAP SERPL CALC-SCNC: 16 MMOL/L — SIGNIFICANT CHANGE UP (ref 5–17)
AST SERPL-CCNC: 44 U/L — HIGH (ref 10–40)
BILIRUB SERPL-MCNC: 0.5 MG/DL — SIGNIFICANT CHANGE UP (ref 0.2–1.2)
BUN SERPL-MCNC: 87 MG/DL — HIGH (ref 7–23)
CALCIUM SERPL-MCNC: 8.3 MG/DL — LOW (ref 8.4–10.5)
CHLORIDE SERPL-SCNC: 109 MMOL/L — HIGH (ref 96–108)
CO2 SERPL-SCNC: 22 MMOL/L — SIGNIFICANT CHANGE UP (ref 22–31)
CREAT SERPL-MCNC: 1.57 MG/DL — HIGH (ref 0.5–1.3)
EGFR: 34 ML/MIN/1.73M2 — LOW
GLUCOSE BLDC GLUCOMTR-MCNC: 185 MG/DL — HIGH (ref 70–99)
GLUCOSE BLDC GLUCOMTR-MCNC: 235 MG/DL — HIGH (ref 70–99)
GLUCOSE BLDC GLUCOMTR-MCNC: 248 MG/DL — HIGH (ref 70–99)
GLUCOSE SERPL-MCNC: 236 MG/DL — HIGH (ref 70–99)
HCT VFR BLD CALC: 26.6 % — LOW (ref 34.5–45)
HGB BLD-MCNC: 8.2 G/DL — LOW (ref 11.5–15.5)
INR BLD: 1.28 RATIO — HIGH (ref 0.88–1.16)
MCHC RBC-ENTMCNC: 26.9 PG — LOW (ref 27–34)
MCHC RBC-ENTMCNC: 30.8 GM/DL — LOW (ref 32–36)
MCV RBC AUTO: 87.2 FL — SIGNIFICANT CHANGE UP (ref 80–100)
NRBC # BLD: 0 /100 WBCS — SIGNIFICANT CHANGE UP (ref 0–0)
PHOSPHATE SERPL-MCNC: 3.4 MG/DL — SIGNIFICANT CHANGE UP (ref 2.5–4.5)
PLATELET # BLD AUTO: 226 K/UL — SIGNIFICANT CHANGE UP (ref 150–400)
POTASSIUM SERPL-MCNC: 4 MMOL/L — SIGNIFICANT CHANGE UP (ref 3.5–5.3)
POTASSIUM SERPL-SCNC: 4 MMOL/L — SIGNIFICANT CHANGE UP (ref 3.5–5.3)
PROCALCITONIN SERPL-MCNC: 6.65 NG/ML — HIGH (ref 0.02–0.1)
PROT SERPL-MCNC: 6.6 G/DL — SIGNIFICANT CHANGE UP (ref 6–8.3)
PROTHROM AB SERPL-ACNC: 14.8 SEC — HIGH (ref 10.5–13.4)
RBC # BLD: 3.05 M/UL — LOW (ref 3.8–5.2)
RBC # FLD: 16.8 % — HIGH (ref 10.3–14.5)
SODIUM SERPL-SCNC: 147 MMOL/L — HIGH (ref 135–145)
WBC # BLD: 15.38 K/UL — HIGH (ref 3.8–10.5)
WBC # FLD AUTO: 15.38 K/UL — HIGH (ref 3.8–10.5)

## 2022-10-20 PROCEDURE — 74176 CT ABD & PELVIS W/O CONTRAST: CPT | Mod: 26

## 2022-10-20 PROCEDURE — 99233 SBSQ HOSP IP/OBS HIGH 50: CPT

## 2022-10-20 PROCEDURE — 99232 SBSQ HOSP IP/OBS MODERATE 35: CPT

## 2022-10-20 RX ORDER — CADEXOMER IODINE 0.9 %
1 PADS, MEDICATED (EA) TOPICAL
Refills: 0 | Status: DISCONTINUED | OUTPATIENT
Start: 2022-10-20 | End: 2022-11-02

## 2022-10-20 RX ORDER — POVIDONE-IODINE 5 %
1 AEROSOL (ML) TOPICAL DAILY
Refills: 0 | Status: DISCONTINUED | OUTPATIENT
Start: 2022-10-20 | End: 2022-11-02

## 2022-10-20 RX ORDER — MIDODRINE HYDROCHLORIDE 2.5 MG/1
10 TABLET ORAL EVERY 8 HOURS
Refills: 0 | Status: DISCONTINUED | OUTPATIENT
Start: 2022-10-20 | End: 2022-11-02

## 2022-10-20 RX ORDER — INSULIN LISPRO 100/ML
VIAL (ML) SUBCUTANEOUS EVERY 6 HOURS
Refills: 0 | Status: DISCONTINUED | OUTPATIENT
Start: 2022-10-20 | End: 2022-10-27

## 2022-10-20 RX ADMIN — Medication 3 MILLIGRAM(S): at 20:59

## 2022-10-20 RX ADMIN — Medication 1 MILLIGRAM(S): at 17:00

## 2022-10-20 RX ADMIN — Medication 1 PUFF(S): at 13:09

## 2022-10-20 RX ADMIN — SIMETHICONE 80 MILLIGRAM(S): 80 TABLET, CHEWABLE ORAL at 06:14

## 2022-10-20 RX ADMIN — Medication 1 PUFF(S): at 01:41

## 2022-10-20 RX ADMIN — Medication 1 APPLICATION(S): at 12:42

## 2022-10-20 RX ADMIN — ALBUTEROL 2 PUFF(S): 90 AEROSOL, METERED ORAL at 07:03

## 2022-10-20 RX ADMIN — Medication 2: at 06:13

## 2022-10-20 RX ADMIN — Medication 1 MILLIGRAM(S): at 21:57

## 2022-10-20 RX ADMIN — Medication 4: at 12:36

## 2022-10-20 RX ADMIN — Medication 1 PUFF(S): at 07:02

## 2022-10-20 RX ADMIN — PANTOPRAZOLE SODIUM 40 MILLIGRAM(S): 20 TABLET, DELAYED RELEASE ORAL at 06:32

## 2022-10-20 RX ADMIN — CHLORHEXIDINE GLUCONATE 15 MILLILITER(S): 213 SOLUTION TOPICAL at 17:09

## 2022-10-20 RX ADMIN — SODIUM CHLORIDE 80 MILLILITER(S): 9 INJECTION INTRAMUSCULAR; INTRAVENOUS; SUBCUTANEOUS at 17:00

## 2022-10-20 RX ADMIN — Medication 1 MILLIGRAM(S): at 20:59

## 2022-10-20 RX ADMIN — HEPARIN SODIUM 5000 UNIT(S): 5000 INJECTION INTRAVENOUS; SUBCUTANEOUS at 17:10

## 2022-10-20 RX ADMIN — HEPARIN SODIUM 5000 UNIT(S): 5000 INJECTION INTRAVENOUS; SUBCUTANEOUS at 06:14

## 2022-10-20 RX ADMIN — Medication 1 MILLIGRAM(S): at 03:20

## 2022-10-20 RX ADMIN — Medication 2: at 17:09

## 2022-10-20 RX ADMIN — ALBUTEROL 2 PUFF(S): 90 AEROSOL, METERED ORAL at 19:38

## 2022-10-20 RX ADMIN — ALBUTEROL 2 PUFF(S): 90 AEROSOL, METERED ORAL at 13:08

## 2022-10-20 RX ADMIN — CHLORHEXIDINE GLUCONATE 1 APPLICATION(S): 213 SOLUTION TOPICAL at 06:19

## 2022-10-20 RX ADMIN — Medication 1 MILLIGRAM(S): at 08:54

## 2022-10-20 RX ADMIN — CHLORHEXIDINE GLUCONATE 15 MILLILITER(S): 213 SOLUTION TOPICAL at 06:14

## 2022-10-20 RX ADMIN — SIMETHICONE 80 MILLIGRAM(S): 80 TABLET, CHEWABLE ORAL at 17:09

## 2022-10-20 RX ADMIN — MIDODRINE HYDROCHLORIDE 10 MILLIGRAM(S): 2.5 TABLET ORAL at 21:57

## 2022-10-20 RX ADMIN — Medication 1 APPLICATION(S): at 12:41

## 2022-10-20 RX ADMIN — ALBUTEROL 2 PUFF(S): 90 AEROSOL, METERED ORAL at 01:41

## 2022-10-20 RX ADMIN — Medication 1 PUFF(S): at 19:39

## 2022-10-20 RX ADMIN — CHLORHEXIDINE GLUCONATE 1 APPLICATION(S): 213 SOLUTION TOPICAL at 17:10

## 2022-10-20 RX ADMIN — Medication 1 MILLIGRAM(S): at 12:36

## 2022-10-20 NOTE — PROGRESS NOTE ADULT - SUBJECTIVE AND OBJECTIVE BOX
Date/Time Patient Seen:  		  Referring MD:   Data Reviewed	       Patient is a 76y old  Female who presents with a chief complaint of Acute on Hypoxemic respiratory failure (19 Oct 2022 20:43)      Subjective/HPI     PAST MEDICAL & SURGICAL HISTORY:  Dementia of frontal lobe type    Aphasic stroke    Diabetes mellitus    Respiratory failure    Hypertension    GERD (gastroesophageal reflux disease)    Constipation    Respiratory failure    CVA (cerebral vascular accident)    HTN (hypertension)    DM (diabetes mellitus)    Advanced dementia    COVID-19 virus detected    Quadriplegia    Pneumonia    Type II diabetes mellitus    Hx of appendectomy    Gastrostomy in place    Tracheostomy in place    Tracheostomy tube present    Feeding by G-tube          Medication list         MEDICATIONS  (STANDING):  ALBUTerol    90 MICROgram(s) HFA Inhaler 2 Puff(s) Inhalation every 6 hours  chlorhexidine 0.12% Liquid 15 milliLiter(s) Oral Mucosa every 12 hours  chlorhexidine 2% Cloths 1 Application(s) Topical two times a day  dexMEDEtomidine Infusion 0.5 MICROgram(s)/kG/Hr (6.14 mL/Hr) IV Continuous <Continuous>  dextrose 5%. 1000 milliLiter(s) (50 mL/Hr) IV Continuous <Continuous>  dextrose 5%. 1000 milliLiter(s) (100 mL/Hr) IV Continuous <Continuous>  dextrose 50% Injectable 25 Gram(s) IV Push once  dextrose 50% Injectable 12.5 Gram(s) IV Push once  dextrose 50% Injectable 25 Gram(s) IV Push once  glucagon  Injectable 1 milliGRAM(s) IntraMuscular once  heparin   Injectable 5000 Unit(s) SubCutaneous every 12 hours  insulin lispro (ADMELOG) corrective regimen sliding scale   SubCutaneous every 6 hours  ipratropium 17 MICROgram(s) HFA Inhaler 1 Puff(s) Inhalation every 6 hours  levoFLOXacin IVPB 750 milliGRAM(s) IV Intermittent every 48 hours  pantoprazole    Tablet 40 milliGRAM(s) Oral before breakfast  simethicone 80 milliGRAM(s) Chew every 12 hours  sodium chloride 0.9%. 1000 milliLiter(s) (80 mL/Hr) IV Continuous <Continuous>    MEDICATIONS  (PRN):  acetaminophen     Tablet .. 650 milliGRAM(s) Oral every 6 hours PRN Temp greater or equal to 38C (100.4F), Mild Pain (1 - 3)  aluminum hydroxide/magnesium hydroxide/simethicone Suspension 30 milliLiter(s) Oral every 4 hours PRN Dyspepsia  dextrose Oral Gel 15 Gram(s) Oral once PRN Blood Glucose LESS THAN 70 milliGRAM(s)/deciliter  LORazepam     Tablet 1 milliGRAM(s) Oral every 4 hours PRN Anxiety  melatonin 3 milliGRAM(s) Oral at bedtime PRN Insomnia  ondansetron Injectable 4 milliGRAM(s) IV Push every 8 hours PRN Nausea and/or Vomiting         Vitals log        ICU Vital Signs Last 24 Hrs  T(C): 37.2 (20 Oct 2022 04:00), Max: 37.2 (20 Oct 2022 04:00)  T(F): 98.9 (20 Oct 2022 04:00), Max: 98.9 (20 Oct 2022 04:00)  HR: 93 (20 Oct 2022 05:56) (83 - 105)  BP: 122/65 (20 Oct 2022 04:00) (93/59 - 135/62)  BP(mean): 82 (20 Oct 2022 04:00) (68 - 90)  ABP: --  ABP(mean): --  RR: 32 (20 Oct 2022 04:00) (9 - 35)  SpO2: 95% (20 Oct 2022 05:56) (91% - 100%)    O2 Parameters below as of 19 Oct 2022 20:00  Patient On (Oxygen Delivery Method): ventilator    O2 Concentration (%): 60         Mode: AC/ CMV (Assist Control/ Continuous Mandatory Ventilation)  RR (machine): 18  TV (machine): 400  FiO2: 60  PEEP: 5  ITime: 1  MAP: 20  PIP: 34      Input and Output:  I&O's Detail    18 Oct 2022 07:01  -  19 Oct 2022 07:00  --------------------------------------------------------  IN:    IV PiggyBack: 50 mL    PRBCs (Packed Red Blood Cells): 330 mL    sodium chloride 0.9%: 1040 mL    Sodium Chloride 0.9% Bolus: 2000 mL  Total IN: 3420 mL    OUT:    Indwelling Catheter - Urethral (mL): 850 mL  Total OUT: 850 mL    Total NET: 2570 mL      19 Oct 2022 07:01  -  20 Oct 2022 06:51  --------------------------------------------------------  IN:    IV PiggyBack: 50 mL    PRBCs (Packed Red Blood Cells): 273 mL    sodium chloride 0.9%: 1520 mL  Total IN: 1843 mL    OUT:    Indwelling Catheter - Urethral (mL): 1125 mL  Total OUT: 1125 mL    Total NET: 718 mL          Lab Data                        8.2    15.38 )-----------( 226      ( 20 Oct 2022 06:21 )             26.6     10-19    141  |  103  |  102<H>  ----------------------------<  306<H>  5.8<H>   |  22  |  2.24<H>    Ca    7.5<L>      19 Oct 2022 06:32  Phos  4.9     10-19  Mg     3.0     10-19    TPro  6.2  /  Alb  1.7<L>  /  TBili  0.5  /  DBili  x   /  AST  179<H>  /  ALT  103<H>  /  AlkPhos  131<H>  10-19    ABG - ( 18 Oct 2022 11:40 )  pH, Arterial: 7.51  pH, Blood: x     /  pCO2: 37    /  pO2: 257   / HCO3: 30    / Base Excess: 6.5   /  SaO2: 99.8                    Review of Systems	      Objective     Physical Examination    heart s1s2  lung dec bS  head nc      Pertinent Lab findings & Imaging      Annalise:  NO   Adequate UO     I&O's Detail    18 Oct 2022 07:01  -  19 Oct 2022 07:00  --------------------------------------------------------  IN:    IV PiggyBack: 50 mL    PRBCs (Packed Red Blood Cells): 330 mL    sodium chloride 0.9%: 1040 mL    Sodium Chloride 0.9% Bolus: 2000 mL  Total IN: 3420 mL    OUT:    Indwelling Catheter - Urethral (mL): 850 mL  Total OUT: 850 mL    Total NET: 2570 mL      19 Oct 2022 07:01  -  20 Oct 2022 06:51  --------------------------------------------------------  IN:    IV PiggyBack: 50 mL    PRBCs (Packed Red Blood Cells): 273 mL    sodium chloride 0.9%: 1520 mL  Total IN: 1843 mL    OUT:    Indwelling Catheter - Urethral (mL): 1125 mL  Total OUT: 1125 mL    Total NET: 718 mL               Discussed with:     Cultures:	        Radiology

## 2022-10-20 NOTE — PROGRESS NOTE ADULT - SUBJECTIVE AND OBJECTIVE BOX
Patient is a 76y old  Female who presents with a chief complaint of Acute on Hypoxemic respiratory failure (20 Oct 2022 13:02)      INTERVAL HPI/OVERNIGHT EVENTS:    central line placed overnight  seen by wound care      MEDICATIONS  (STANDING):  ALBUTerol    90 MICROgram(s) HFA Inhaler 2 Puff(s) Inhalation every 6 hours  cadexomer iodine 0.9% Gel 1 Application(s) Topical <User Schedule>  chlorhexidine 0.12% Liquid 15 milliLiter(s) Oral Mucosa every 12 hours  chlorhexidine 2% Cloths 1 Application(s) Topical two times a day  dexMEDEtomidine Infusion 0.5 MICROgram(s)/kG/Hr (6.14 mL/Hr) IV Continuous <Continuous>  dextrose 5%. 1000 milliLiter(s) (50 mL/Hr) IV Continuous <Continuous>  dextrose 5%. 1000 milliLiter(s) (100 mL/Hr) IV Continuous <Continuous>  dextrose 50% Injectable 25 Gram(s) IV Push once  dextrose 50% Injectable 12.5 Gram(s) IV Push once  dextrose 50% Injectable 25 Gram(s) IV Push once  glucagon  Injectable 1 milliGRAM(s) IntraMuscular once  heparin   Injectable 5000 Unit(s) SubCutaneous every 12 hours  insulin lispro (ADMELOG) corrective regimen sliding scale   SubCutaneous every 6 hours  ipratropium 17 MICROgram(s) HFA Inhaler 1 Puff(s) Inhalation every 6 hours  levoFLOXacin IVPB 750 milliGRAM(s) IV Intermittent every 48 hours  pantoprazole    Tablet 40 milliGRAM(s) Oral before breakfast  povidone iodine 10% Solution 1 Application(s) Topical daily  simethicone 80 milliGRAM(s) Chew every 12 hours  sodium chloride 0.9%. 1000 milliLiter(s) (80 mL/Hr) IV Continuous <Continuous>    MEDICATIONS  (PRN):  acetaminophen     Tablet .. 650 milliGRAM(s) Oral every 6 hours PRN Temp greater or equal to 38C (100.4F), Mild Pain (1 - 3)  aluminum hydroxide/magnesium hydroxide/simethicone Suspension 30 milliLiter(s) Oral every 4 hours PRN Dyspepsia  dextrose Oral Gel 15 Gram(s) Oral once PRN Blood Glucose LESS THAN 70 milliGRAM(s)/deciliter  LORazepam     Tablet 1 milliGRAM(s) Oral every 4 hours PRN Anxiety  melatonin 3 milliGRAM(s) Oral at bedtime PRN Insomnia  ondansetron Injectable 4 milliGRAM(s) IV Push every 8 hours PRN Nausea and/or Vomiting      Allergies    codeine (Hives)    Intolerances        REVIEW OF SYSTEMS:  unable to obtain 2/2 mental status    Vital Signs Last 24 Hrs  T(C): 37.3 (20 Oct 2022 12:46), Max: 37.3 (20 Oct 2022 12:46)  T(F): 99.1 (20 Oct 2022 12:46), Max: 99.1 (20 Oct 2022 12:46)  HR: 93 (20 Oct 2022 13:14) (83 - 124)  BP: 112/60 (20 Oct 2022 12:00) (93/59 - 143/65)  BP(mean): 75 (20 Oct 2022 12:00) (70 - 92)  RR: 18 (20 Oct 2022 12:00) (18 - 34)  SpO2: 94% (20 Oct 2022 13:14) (91% - 100%)    Parameters below as of 20 Oct 2022 13:14  Patient On (Oxygen Delivery Method): ventilator        PHYSICAL EXAM:  General: NAD  HEENT: NC/AT, EOMI,   Neck: trach to vent  Lungs: MV breath sounds  Heart: Regular rate and rhythm. No murmur, rub or gallop.  Abdomen: Soft. Nondistended. Nontender.   Skin: Warm. Dry.      LABS:                        8.2    15.38 )-----------( 226      ( 20 Oct 2022 06:21 )             26.6     20 Oct 2022 06:21    147    |  109    |  87     ----------------------------<  236    4.0     |  22     |  1.57     Ca    8.3        20 Oct 2022 06:21  Phos  3.4       20 Oct 2022 06:21    TPro  6.6    /  Alb  1.6    /  TBili  0.5    /  DBili  x      /  AST  44     /  ALT  64     /  AlkPhos  104    20 Oct 2022 06:21    PT/INR - ( 20 Oct 2022 06:21 )   PT: 14.8 sec;   INR: 1.28 ratio             CAPILLARY BLOOD GLUCOSE      POCT Blood Glucose.: 248 mg/dL (20 Oct 2022 12:34)  POCT Blood Glucose.: 235 mg/dL (20 Oct 2022 06:07)  POCT Blood Glucose.: 168 mg/dL (19 Oct 2022 23:07)  POCT Blood Glucose.: 197 mg/dL (19 Oct 2022 16:22)      RADIOLOGY & ADDITIONAL TESTS:

## 2022-10-20 NOTE — PROGRESS NOTE ADULT - ASSESSMENT
75yo F with PMH of dementia, COPD with chronic resp failure and is vent dependent, s/p PEG, hx of cardiac arrest, diastolic CHF, cor pulmonale, hx of CVA, COVID-19 infxn, CKD, anemia, multiple admissions for respiratory distress (recent admission from 6/1-6/9 diagnosed with PNA with parapneumonic pleural effusion s/p thoracentesis and Avycaz) who presents from [facility] for respiratory distress again a/w sepsis and respiratory failure due to suspected recurrent gram-negative PNA.    Severe sepsis and acute hypoxic respiratory failure 2/2 gram-negative PNA   - continue current vent settings (on AC with RR 16, , PEEP 5, FiO2 100%) maintain O2 sat >92%  - was on ertapenem,  zosyn+bactrim , now on levaquin  - lactate downtrending, procalcitonin high but improving with treatment (4.76 -> 3.34 -> 1.77)  - cautious use of fluids given hx of CHF (received 1750cc of NS in ED)  - f/u blood cultures (NGTD), urine culture pending   - f/u trach sputum culture - has GNR growing awaiting speciation / sensitivities  - has hx of respiratory distress driven by vent asynchrony and would require IV benzos ; trialed IV fentanyl with adequate effect this evening  - Checked CT Abd/P to eval for any other potential source sign of infection and found no significant sign of intra-abdominal infection on CT  - cc/pulm consult (Jaime), recs appreciated  - palliative care (Emre), recs appreciated - pt is full code  C/w levofloxacin 750mg q48h based on most recent cx per ID recs    Diastolic CHF  Hx of Pleural effusions  - last TTE was 5/30 with EF 65% with normal LVSF, dilated   RV, and moderate pHTN -> repeat TTE appears unchanged  - may need repeat thoracentesis (had 1.5L drained three admissions ago), pulmonology consulted; was parapneumonic on that admission as opposed to transudative and thus less likely related to CHF    hyperkalemia RYAN likely due to sepsis  will give bolus of fluids: careful use going forward.  repeat Cr and K    Anemia of chronic disease  - has been evaluated by GI and hematology in the past -> dx with ACD with intermittent GI bleeding  - s/p 1 unit yesterday, another unit ordered today 2/2 today's Hb being 6.3    Hyperkalemia  - given both insulin and lasix   still remains elevated  nutrition consulted    Hx of CKD stage 3 - near baseline of 1.2-1.3  - renally dose medications and monitor BMP and electrolytes   - Cr is 1.3 with eGFR of 41, but given pt has low muscle mass from being bedbound for years, this GFR estimate is likely falsely high  - consider nephro eval   nutrition consulted    HTN  - not on any BP meds at facility  - monitor VS    DM2  - NPO with TF  - continue with insulin 8units qAM as per last admission  - c/w moderate insulin coverage scale  - goal -180    Diet  - continue with same TF from last admission    Preventive measures  - cont with heparin subq for DVT ppx  - Full Code

## 2022-10-20 NOTE — PROGRESS NOTE ADULT - SUBJECTIVE AND OBJECTIVE BOX
[facility], Division of Infectious Diseases  MOIZ Lora Y. Patel, S. Shah, G. [facility]  [phone]    Name: BREA BECKHAM  Age: 76y  Gender: Female  MRN: [id number]    Interval History:  Patient seen and examined at bedside in [facility]  No acute overnight events. Afebrile  Notes reviewed    Antibiotics:  levoFLOXacin IVPB 750 milliGRAM(s) IV Intermittent every 48 hours      Medications:  acetaminophen     Tablet .. 650 milliGRAM(s) Oral every 6 hours PRN  ALBUTerol    90 MICROgram(s) HFA Inhaler 2 Puff(s) Inhalation every 6 hours  aluminum hydroxide/magnesium hydroxide/simethicone Suspension 30 milliLiter(s) Oral every 4 hours PRN  cadexomer iodine 0.9% Gel 1 Application(s) Topical <User Schedule>  chlorhexidine 0.12% Liquid 15 milliLiter(s) Oral Mucosa every 12 hours  chlorhexidine 2% Cloths 1 Application(s) Topical two times a day  dexMEDEtomidine Infusion 0.5 MICROgram(s)/kG/Hr IV Continuous <Continuous>  dextrose 5%. 1000 milliLiter(s) IV Continuous <Continuous>  dextrose 5%. 1000 milliLiter(s) IV Continuous <Continuous>  dextrose 50% Injectable 25 Gram(s) IV Push once  dextrose 50% Injectable 12.5 Gram(s) IV Push once  dextrose 50% Injectable 25 Gram(s) IV Push once  dextrose Oral Gel 15 Gram(s) Oral once PRN  glucagon  Injectable 1 milliGRAM(s) IntraMuscular once  heparin   Injectable 5000 Unit(s) SubCutaneous every 12 hours  insulin lispro (ADMELOG) corrective regimen sliding scale   SubCutaneous every 6 hours  ipratropium 17 MICROgram(s) HFA Inhaler 1 Puff(s) Inhalation every 6 hours  levoFLOXacin IVPB 750 milliGRAM(s) IV Intermittent every 48 hours  LORazepam     Tablet 1 milliGRAM(s) Oral every 4 hours PRN  melatonin 3 milliGRAM(s) Oral at bedtime PRN  ondansetron Injectable 4 milliGRAM(s) IV Push every 8 hours PRN  pantoprazole    Tablet 40 milliGRAM(s) Oral before breakfast  povidone iodine 10% Solution 1 Application(s) Topical daily  simethicone 80 milliGRAM(s) Chew every 12 hours  sodium chloride 0.9%. 1000 milliLiter(s) IV Continuous <Continuous>      Review of Systems:  unable to obtain    Allergies: codeine (Hives)    For details regarding the patient's past medical history, social history, family history, and other miscellaneous elements, please refer the initial infectious diseases consultation and/or the admitting history and physical examination for this admission.    Objective:  Vitals:   T(C): 37.3 (10-20-22 @ 12:46), Max: 37.3 (10-20-22 @ 12:46)  HR: 91 (10-20-22 @ 12:00) (83 - 124)  BP: 112/60 (10-20-22 @ 12:00) (93/59 - 143/65)  RR: 18 (10-20-22 @ 12:00) (18 - 34)  SpO2: 99% (10-20-22 @ 12:00) (91% - 100%)    Physical Examination:  General: NAD  HEENT: NC/AT, EOMI,   Neck: trach to vent  Lungs: MV breath sounds  Heart: Regular rate and rhythm. No murmur, rub or gallop.  Abdomen: Soft. Nondistended. Nontender. B  Skin: Warm. Dry.      Laboratory Studies:  CBC:                       8.2    15.38 )-----------( 226      ( 20 Oct 2022 06:21 )             26.6     CMP: 10-20    147<H>  |  109<H>  |  87<H>  ----------------------------<  236<H>  4.0   |  22  |  1.57<H>    Ca    8.3<L>      20 Oct 2022 06:21  Phos  3.4     10-20  Mg     3.0     10-19    TPro  6.6  /  Alb  1.6<L>  /  TBili  0.5  /  DBili  x   /  AST  44<H>  /  ALT  64<H>  /  AlkPhos  104  10-20    LIVER FUNCTIONS - ( 20 Oct 2022 06:21 )  Alb: 1.6 g/dL / Pro: 6.6 g/dL / ALK PHOS: 104 U/L / ALT: 64 U/L DA / AST: 44 U/L / GGT: x               Microbiology: reviewed    Culture - Sputum (collected 10-19-22 @ 00:58)  Source: .Sputum Sputum trap  Gram Stain (10-19-22 @ 19:24):    Few Squamous epithelial cells per low power field    Few polymorphonuclear leukocytes per low power field    Few Gram positive cocci in pairs per oil power field    Culture - Urine (collected 10-18-22 @ 12:27)  Source: Clean Catch Clean Catch (Midstream)  Preliminary Report (10-19-22 @ 15:47):    >100,000 CFU/ml Escherichia coli    Culture - Blood (collected 10-18-22 @ 12:00)  Source: .Blood Blood-Peripheral  Preliminary Report (10-19-22 @ 17:01):    No growth to date.    Culture - Blood (collected 10-18-22 @ 12:00)  Source: .Blood Blood-Peripheral  Preliminary Report (10-19-22 @ 17:01):    No growth to date.          Radiology: reviewed

## 2022-10-20 NOTE — PROGRESS NOTE ADULT - ASSESSMENT
Pt is a 76W w/ PMHx of DM2, COPD, HTN, dementia, hx of subarachnoid hemorrhage, and history of chronic trach brought in by ambulance for evaluation of low CO2 and cyanotic appearance.   Well-known and has had multiple admissions for Acute on chronic RF and PNA w/ MDROs    Acute VAP/PNA  Acute on chronic HF  - pt p/w cyanosis  - most recent admission in end of August, most recent cx w/ Pseudomonas and Stenotrophomonas  - labs notable for leukocytosis to 13  - CT chest Groundglass opacities and interlobular septal thickening suggestive of CHF. 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 Please call with any questions.   Andressa Brantley M.D.  [facility] Division of Infectious Diseases [phone]

## 2022-10-20 NOTE — PROGRESS NOTE ADULT - SUBJECTIVE AND OBJECTIVE BOX
BREA BECKHAM     [facility] 01    Allergies    codeine (Hives)    Intolerances        PAST MEDICAL & SURGICAL HISTORY:  Dementia of frontal lobe type      Aphasic stroke      Diabetes mellitus      Respiratory failure      Hypertension      GERD (gastroesophageal reflux disease)      Constipation      Respiratory failure      CVA (cerebral vascular accident)      HTN (hypertension)      DM (diabetes mellitus)      Advanced dementia      COVID-19 virus detected      Quadriplegia      Pneumonia      Type II diabetes mellitus      Hx of appendectomy      Gastrostomy in place      Tracheostomy in place      Tracheostomy tube present      Feeding by G-tube          FAMILY HISTORY:      Home Medications:  albuterol 90 mcg/inh inhalation aerosol with adapter: 2  inhaled every 6 hours (18 Oct 2022 11:42)  Bacid (LAC) oral tablet: 2 tab(s) by gastrostomy tube once a day (18 Oct 2022 11:42)  Betadine 10% topical swab: cleanse right and left great toes (18 Oct 2022 11:42)  chlorhexidine 0.12% mucous membrane liquid: 15 milliliter(s) mucous membrane 2 times a day (18 Oct 2022 11:42)  Eucerin topical cream: Apply topically to affected area once a day bilateral feet (18 Oct 2022 11:42)  insulin glargine 100 units/mL subcutaneous solution: 8 unit(s) subcutaneous once a day (in the morning) (18 Oct 2022 11:42)  ipratropium-albuterol 0.5 mg-2.5 mg/3 mL inhalation solution: 3 milliliter(s) inhaled 4 times a day (18 Oct 2022 11:42)  LORazepam 1 mg oral tablet: 1 tab(s) by gastrostomy tube every 4 hours (18 Oct 2022 11:42)  methocarbamol 500 mg oral tablet: 1 tab(s) by gastrostomy tube 2 times a day (18 Oct 2022 11:42)  MiraLax oral powder for reconstitution: g-tube (18 Oct 2022 13:04)  Multiple Vitamins oral tablet: 1 tab(s) orally once a day (18 Oct 2022 11:42)  nystatin 100,000 units/g topical powder: 1 application topically 3 times a day (18 Oct 2022 11:42)  omeprazole 20 mg oral delayed release capsule: orally 2 times a day (18 Oct 2022 11:42)  polyethylene glycol 3350 oral powder for reconstitution: 17 gram(s) by gastrostomy tube every 12 hours (18 Oct 2022 11:42)  senna 8.6 mg oral tablet: 3 tab(s) by gastrostomy tube once a day (at bedtime) (18 Oct 2022 11:42)  simethicone 80 mg oral tablet: g-tube (18 Oct 2022 13:04)  simethicone 80 mg oral tablet, chewable: 1 tab(s) by gastrostomy tube every 6 hours (18 Oct 2022 11:42)  sucralfate 1 g/10 mL oral suspension: 10 milliliter(s) g-tube 4 times a day (before meals and at bedtime) (18 Oct 2022 13:04)  Tylenol 325 mg oral tablet: 2 tab(s) by gastrostomy tube once a day; 60 minutes prior to dressing change  (18 Oct 2022 11:42)  Tylenol 325 mg oral tablet: 2 tab(s) by gastrostomy tube every 6 hours, As Needed (18 Oct 2022 11:42)      MEDICATIONS  (STANDING):  ALBUTerol    90 MICROgram(s) HFA Inhaler 2 Puff(s) Inhalation every 6 hours  chlorhexidine 0.12% Liquid 15 milliLiter(s) Oral Mucosa every 12 hours  chlorhexidine 2% Cloths 1 Application(s) Topical two times a day  dexMEDEtomidine Infusion 0.5 MICROgram(s)/kG/Hr (6.14 mL/Hr) IV Continuous <Continuous>  dextrose 5%. 1000 milliLiter(s) (50 mL/Hr) IV Continuous <Continuous>  dextrose 5%. 1000 milliLiter(s) (100 mL/Hr) IV Continuous <Continuous>  dextrose 50% Injectable 25 Gram(s) IV Push once  dextrose 50% Injectable 12.5 Gram(s) IV Push once  dextrose 50% Injectable 25 Gram(s) IV Push once  glucagon  Injectable 1 milliGRAM(s) IntraMuscular once  heparin   Injectable 5000 Unit(s) SubCutaneous every 12 hours  insulin lispro (ADMELOG) corrective regimen sliding scale   SubCutaneous every 6 hours  ipratropium 17 MICROgram(s) HFA Inhaler 1 Puff(s) Inhalation every 6 hours  levoFLOXacin IVPB 750 milliGRAM(s) IV Intermittent every 48 hours  pantoprazole    Tablet 40 milliGRAM(s) Oral before breakfast  simethicone 80 milliGRAM(s) Chew every 12 hours  sodium chloride 0.9%. 1000 milliLiter(s) (80 mL/Hr) IV Continuous <Continuous>    MEDICATIONS  (PRN):  acetaminophen     Tablet .. 650 milliGRAM(s) Oral every 6 hours PRN Temp greater or equal to 38C (100.4F), Mild Pain (1 - 3)  aluminum hydroxide/magnesium hydroxide/simethicone Suspension 30 milliLiter(s) Oral every 4 hours PRN Dyspepsia  dextrose Oral Gel 15 Gram(s) Oral once PRN Blood Glucose LESS THAN 70 milliGRAM(s)/deciliter  LORazepam     Tablet 1 milliGRAM(s) Oral every 4 hours PRN Anxiety  melatonin 3 milliGRAM(s) Oral at bedtime PRN Insomnia  ondansetron Injectable 4 milliGRAM(s) IV Push every 8 hours PRN Nausea and/or Vomiting      Diet, NPO with Tube Feed:   Tube Feeding Modality: Gastrostomy  Nepro with Carb Steady  Total Volume for 24 Hours (mL): 800  Continuous  Starting Tube Feed Rate mL per Hour: 20  Increase Tube Feed Rate by (mL): 10     Every 4 hours  Until Goal Tube Feed Rate (mL per Hour): 40  Tube Feed Duration (in Hours): 20  Tube Feed Start Time: 16:00  Tube Feed Stop Time: 12:00  Free Water Flush  Bolus   Total Volume per Flush (mL): 250   Frequency: Every 6 Hours  Lucas(7 Gm Arginine/7 Gm Glut/1.2 Gm HMB     Qty per Day:  1 (10-19-22 @ 13:57) [Pending Verification By Attending]          Vital Signs Last 24 Hrs  T(C): 37.2 (20 Oct 2022 09:00), Max: 37.2 (20 Oct 2022 04:00)  T(F): 99 (20 Oct 2022 09:00), Max: 99 (20 Oct 2022 09:00)  HR: 98 (20 Oct 2022 10:41) (83 - 124)  BP: 142/75 (20 Oct 2022 10:00) (93/59 - 143/65)  BP(mean): 92 (20 Oct 2022 10:00) (70 - 92)  RR: 20 (20 Oct 2022 10:00) (18 - 34)  SpO2: 100% (20 Oct 2022 10:41) (91% - 100%)    Parameters below as of 20 Oct 2022 10:00  Patient On (Oxygen Delivery Method): ventilator          10-19-22 @ 07:01  -  10-20-22 @ 07:00  --------------------------------------------------------  IN: 1923 mL / OUT: 1125 mL / NET: 798 mL    10-20-22 @ 07:01  -  10-20-22 @ 10:55  --------------------------------------------------------  IN: 160 mL / OUT: 0 mL / NET: 160 mL        Mode: AC/ CMV (Assist Control/ Continuous Mandatory Ventilation), RR (machine): 18, TV (machine): 400, FiO2: 60, PEEP: 5, ITime: 1, MAP: 17, PIP: 31      LABS:                        8.2    15.38 )-----------( 226      ( 20 Oct 2022 06:21 )             26.6     10-20    147<H>  |  109<H>  |  87<H>  ----------------------------<  236<H>  4.0   |  22  |  1.57<H>    Ca    8.3<L>      20 Oct 2022 06:21  Phos  3.4     10-20  Mg     3.0     10-19    TPro  6.6  /  Alb  1.6<L>  /  TBili  0.5  /  DBili  x   /  AST  44<H>  /  ALT  64<H>  /  AlkPhos  104  10-20    PT/INR - ( 20 Oct 2022 06:21 )   PT: 14.8 sec;   INR: 1.28 ratio         PTT - ( 18 Oct 2022 12:00 )  PTT:38.0 sec  Urinalysis Basic - ( 18 Oct 2022 12:27 )    Color: Yellow / Appearance: Clear / S.015 / pH: x  Gluc: x / Ketone: Negative  / Bili: Negative / Urobili: Negative mg/dL   Blood: x / Protein: 15 mg/dL / Nitrite: Negative   Leuk Esterase: Moderate / RBC: 0-2 /HPF / WBC    Sq Epi: x / Non Sq Epi: Occasional / Bacteria: Many        ABG - ( 18 Oct 2022 11:40 )  pH, Arterial: 7.51  pH, Blood: x     /  pCO2: 37    /  pO2: 257   / HCO3: 30    / Base Excess: 6.5   /  SaO2: 99.8                WBC:  WBC Count: 15.38 K/uL (10-20 @ 06:21)  WBC Count: 11.58 K/uL (10-19 @ 20:43)  WBC Count: 8.81 K/uL (10-19 @ 06:32)  WBC Count: 13.01 K/uL (10-18 @ 11:30)      MICROBIOLOGY:  RECENT CULTURES:  10-19 .Sputum Sputum trap XXXX   Few Squamous epithelial cells per low power field  Few polymorphonuclear leukocytes per low power field  Few Gram positive cocci in pairs per oil power field XXXX    10-18 Clean Catch Clean Catch (Midstream) XXXX XXXX   >100,000 CFU/ml Escherichia coli    10-18 .Blood Blood-Peripheral XXXX XXXX   No growth to date.                PT/INR - ( 20 Oct 2022 06:21 )   PT: 14.8 sec;   INR: 1.28 ratio         PTT - ( 18 Oct 2022 12:00 )  PTT:38.0 sec    Sodium:  Sodium, Serum: 147 mmol/L (10-20 @ 06:21)  Sodium, Serum: 141 mmol/L (10-19 @ 06:32)  Sodium, Serum: 133 mmol/L (10-18 @ 11:30)      1.57 mg/dL 10-20 @ 06:21  2.24 mg/dL 10-19 @ 06:32  2.42 mg/dL 10-18 @ 11:30      Hemoglobin:  Hemoglobin: 8.2 g/dL (10-20 @ 06:21)  Hemoglobin: 7.5 g/dL (10-19 @ 20:43)  Hemoglobin: 6.3 g/dL (10-19 @ 06:32)  Hemoglobin: 7.4 g/dL (10-18 @ 11:30)      Platelets: Platelet Count - Automated: 226 K/uL (10-20 @ 06:21)  Platelet Count - Automated: 204 K/uL (10-19 @ 20:43)  Platelet Count - Automated: 115 K/uL (10-19 @ 06:32)  Platelet Count - Automated: 168 K/uL (10-18 @ 11:30)      LIVER FUNCTIONS - ( 20 Oct 2022 06:21 )  Alb: 1.6 g/dL / Pro: 6.6 g/dL / ALK PHOS: 104 U/L / ALT: 64 U/L DA / AST: 44 U/L / GGT: x             Urinalysis Basic - ( 18 Oct 2022 12:27 )    Color: Yellow / Appearance: Clear / S.015 / pH: x  Gluc: x / Ketone: Negative  / Bili: Negative / Urobili: Negative mg/dL   Blood: x / Protein: 15 mg/dL / Nitrite: Negative   Leuk Esterase: Moderate / RBC: 0-2 /HPF / WBC 11-25   Sq Epi: x / Non Sq Epi: Occasional / Bacteria: Many        RADIOLOGY & ADDITIONAL STUDIES:      MICROBIOLOGY:  RECENT CULTURES:  10-19 .Sputum Sputum trap XXXX   Few Squamous epithelial cells per low power field  Few polymorphonuclear leukocytes per low power field  Few Gram positive cocci in pairs per oil power field XXXX    10-18 Clean Catch Clean Catch (Midstream) XXXX XXXX   >100,000 CFU/ml Escherichia coli    10-18 .Blood Blood-Peripheral XXXX XXXX   No growth to date.

## 2022-10-20 NOTE — PROGRESS NOTE ADULT - ASSESSMENT
76F with dementia, COPD with chronic resp failure and is vent dependent, s/p PEG, hx of cardiac arrest, CHF, cor pulmonale, CVA, COVID-19 infxn, CKD, anemia, multiple admissions    dementia  COPD  chr resp failure  vegetative state  dysphagia  peg  hx of card arrest - anoxic brain  CHF  cva    TLC placed  on emp ABX  CCM notes reviewed  vs noted  labs reviewed    Diagnosis; Prognosis; MOLST Discussed  Marciano -   HCP  pt is full code  spoke with  -

## 2022-10-20 NOTE — CONSULT NOTE ADULT - ASSESSMENT
Patient presents with   1. Gluteal fold unstageable pressure injury 3 x 5 x 3 dark/tan slough  probe to the [id number]% scant/moderate serosanguinous drainage no odor, periwound erythema: periwound dark, friable to 3cm 4-9:00  recommendation:   iodosorb QOD  2. Sacral area scar tissue likely a  resolved stage 3/4 pressure injury  -as per prevention protocol  3. Left hallux unstageable pressure injury 1.5 x 1.5 dry, periwound dry mild erythema  recommendation:   -Continue Betadine daily  4. Right hallux unstageable pressure injury 1.5 x 1.5 dry, periwound dry mild erythema  recommendation:   -Continue Betadine daily  5. Right lateral foot unstageable pressure injury resolved callous, periwound dry mild erythema  recommendation:  -Continue Betadine daily  6. Right lateral/plantar foot unstageable pressure injury resolved with callous dry, periwound dry mild erythema  recommendation:  -Continue Betadine daily  Patient is on a low air loss mattress  for the critically ill patient experiencing multiorgan failure, impaired tissue oxygenation, and perfusion can contribute to skin failure thus the development of a pressure related injury may be unavoidable    This pressure injury is community acquired/YES  At risk for altered tissue perfusion /YES  Impaired perfusion of peripheral tissue /YES  Continue  Nutrition (as tolerated)  Continue  Offloading   Continue Pericare  Apply cair boots at all times while in bed.   Provide skin checks and foot placement q8h.  Care as per medicine will follow w/ you  [facility]   Findings and recommendations discussed with BRANDEN Madrid  Thank you for this consult  Ana Luisa Cantu NP, [facility] [phone]

## 2022-10-20 NOTE — PROGRESS NOTE ADULT - ASSESSMENT
REVIEW OF SYMPTOMS      Able to give (reliable) ROS  NO     PHYSICAL EXAM    HEENT Unremarkable  atraumatic   RESP Fair air entry EXP prolonged    Harsh breath sound Resp distres mild   CARDIAC S1 S2 No S3     NO JVD    ABDOMEN SOFT BS PRESENT NOT DISTENDED No hepatosplenomegaly   PEDAL EDEMA present No calf tenderness  NO rash       AGE/SEX.   76 f  DOA.  10/18/2022  CC .  10/18/2022 resp distress  PRESENTING PROBLEMS .   RESP DISTRESS 10/18/2022  VAP 10/18/2022   ANEMIA 10/18/2022   HYPONATREMIA 10/18/2022   HYPERKALEMIA 10/18/2022   RYAN 10/18/2022   COURSE.     PROCEDURES.  10/19/2022 rij ccpa   PAST ID ISSUES   8/19/2022  zosyn Se Vignesh  8/19/2022 bactrim ds 2 bid   pmh 5/2/2022 SERRATIA CARBAPENEM RESISTANT PSEUDOMONAS   PMH .  pmh Trach  pmh peg  pmh NO idf  pmh Pneumonia    pmh HTN,   pmh DM,   pmh CVA,   pmh  chronic respiratory failure   pmh s/p trach, PEG,   pmh cardiac arrest  pmh pulm hytn echo 8/19/2022 n lvsf dd1 pasp 58    pmh Pl effsn  r PL EFFSN   6/8/2022 r thorac g 161 l 222 p 4.9 p 10 l 16 .38    l pl effsn  5/25/2022 g 183 l 144/381 .37 p 3.4/5.3 .64 p 23 l 36   5/25/2022l pl fluid  lymph pred exudate   cyto (-)           GENERAL DATA .   GOC.  10/18/2022 full code       ALLGY. codeine                            WT.          10/18/2022 48  BMI.          10/18/2022 17                     ICU STAY. 10/18/2022  COVID.  10/18/2022 scv2 (-)     BEST PRACTICE ISSUES.    HOB ELEVATN. Yes  DVT PPLX.   10/18/2022 hpsc    SQUIRES PPLX.  10/18/2022 protonix 40     INFN PPLX.    10/18/2022 ch;lorhexidine .12%  10/19/2022 chlorhexidine 2%    SP SW EM.        DIET.   10/19/2022 nepro 800    VS/ PO/IO/ VENT/ DRIPS.  10/20/2022 afeb 90 120/60  10/20/2022 ac 18/400/5/.6     ASSESSMENT/RECOMMENDATIONS .   RESP.  -- Gas exchange.   10/18/2022 ac 18/400/100/5 751/37/257  -- VENT MANAGEMENT.   HOB elevation  Target Pplat 30 (-)  Target PO 90-95%  Target pH 730 (+)  Daily spontaneous breathing trials   Daily sedation vacation   -- Pleuralk effsn  Past ritchie   R THORAC   6/8/2022  g 161 l 222 p 4.9 p 10 l 16 .38    L THORAC   5/25/2022 g 183 l 144/381 .37 p 3.4/5.3 .64 p 23 l 36   5/25/2022l pl fluid  lymph pred exudate   cyto (-)     Ct ch 10/18/2022 sm r mod l effs large subpulmonic component   a/r No thoracentesis plannd at this point risk prohibitive in vent pt   -- Mediastinal lne.  Ct ch 10/18/2022 again enlarged mediastinal lns likely reactive   a/r will need followup with ct in 2m and consider biopsy if persists   -- wheeze.  10/18 albuterol hf  10/18 atrovent hf   INFECTION.  PAST ID ISSUES   8/19/2022  zosyn Se Vignesh  8/19/2022 bactrim ds 2 bid   pmh 5/2/2022 SERRATIA CARBAPENEM RESISTANT PSEUDOMONAS  -- VAP 10/18/2022.  -- UTI 10/18/2022   w 10/18-10/19-10/20/2022 w 13 - 8.8 - 15  pr 10/20/2022 6.6    ua 10/18/2022 w 11-25   ct ch 10/18/2022 cw 8/18/20232 ggo post upper lobes with interlobular septal thickening infiltrate or atelectasis lower lobes l more than   sm r and sm to mod l effsn  rvp 10/18/2022 (-)   uc 10/18 100K E coli   bc 10/18 (-)   10/18/2022 meropenem Dr NEWTON  dc  10/18/2022 ID Dr Brantley on case   10/19/2022 levaquin 750      CARDIAC.  -- Hypotension.  10/18/2022 88/48   echo 8/19/2022 n lvsf dd1 pasp 58   Target MAP 65 (+)   resolvd   GI.  -- ELEVATED LFTS.  LFTS 10/18-10/19-10/20/2022   ap 140-131- 104  ast - 44  alt 21 - 103 - 64   monitor  HEMAT.  -- Anemia.  Hb 10/18-10/19-10/20/2022 Hb 7.4 -  6.3 - 8.2    inr 10/18/2022 inr 1.23   Monitor  target Hb 7 (+)   -- TRANSFUSION  10/19/2022 1 U PRBC   RENAL.  -- Hyponatremia.  Na 10/18-10/19-10/20/2022 Na 133 - 141 - 147   monitor correct  -- Hyperkalemia.  K 10/18-10/19/2022 K 5.6 - 5.8   10/19/2022 Na zirconium 10 g once given   monitor correct  -- RYAN.  Cr 10/18-10/19-10/20/2022 Cr 2.42-2.2 - 1.5  Cr 8/26/2022 Cr 1.4   Fluid renal us monitor  IV fl.  -- 10/18/2022 ns 70     TIME SPENT   Over 39 minutes aggregate critical care time spent on encounter; activities included   direct patient care, counseling and/or coordinating care reviewing notes, lab data/ imaging , discussion with multidisciplinary team/ patient  /family and explaining in detail risks, benefits, alternatives  of the recommendations     CHAPINCITO GUIDRY 76 f [facility] S 10/18/2022   DR ANG PADGETT

## 2022-10-20 NOTE — PROGRESS NOTE ADULT - SUBJECTIVE AND OBJECTIVE BOX
Chief Complaint: Hypoxia    Interval Events: No events overnight.    Review of Systems:  Unable to obtain    Physical Exam:  Vital Signs Last 24 Hrs  T(C): 37.2 (20 Oct 2022 09:00), Max: 37.2 (20 Oct 2022 04:00)  T(F): 99 (20 Oct 2022 09:00), Max: 99 (20 Oct 2022 09:00)  HR: 100 (20 Oct 2022 09:00) (83 - 124)  BP: 143/65 (20 Oct 2022 09:00) (93/59 - 143/65)  BP(mean): 87 (20 Oct 2022 09:00) (70 - 90)  RR: 34 (20 Oct 2022 09:00) (18 - 34)  SpO2: 95% (20 Oct 2022 09:00) (91% - 100%)  Parameters below as of 20 Oct 2022 09:00  Patient On (Oxygen Delivery Method): ventilator  General: Unresponsive  HEENT: Trach  Neck: No JVD, no carotid bruit  Lungs: CTAB  CV: RRR, nl S1/S2, no M/R/G  Abdomen: S/NT/ND, +BS  Extremities: No LE edema, no cyanosis  Neuro: AAOx0  Skin: No rash    Labs:    10-20    147<H>  |  109<H>  |  87<H>  ----------------------------<  236<H>  4.0   |  22  |  1.57<H>    Ca    8.3<L>      20 Oct 2022 06:21  Phos  3.4     10-20  Mg     3.0     10-19    TPro  6.6  /  Alb  1.6<L>  /  TBili  0.5  /  DBili  x   /  AST  44<H>  /  ALT  64<H>  /  AlkPhos  104  10-20                        8.2    15.38 )-----------( 226      ( 20 Oct 2022 06:21 )             26.6       Telemetry: Sinus rhythm

## 2022-10-20 NOTE — CONSULT NOTE ADULT - SUBJECTIVE AND OBJECTIVE BOX
HPI  location, quality, severity, duration, timing, context, modifying factors, associated signs/symptoms  HPI:  76F with dementia, COPD with chronic resp failure and is vent dependent, s/p PEG, hx of cardiac arrest, CHF, cor pulmonale, CVA, COVID-19 infxn, CKD, anemia, multiple admissions for respiratory distress (last admission from 6/1-6/9  and now august diagnosed with PNA with parapneumonic pleural effusion s/p course of ertapenam, who [facility] distress . Patient not verbal, demented unable to obtain any hx: On admission patient presents with:  1. Gluteal fold unstageable pressure injury 3.5 x 5 x 3 dark/tan slough  scant/moderate serosanguinous drainage no odor, periwound erythema  2. Sacral area scar tissue likely a  resolved stage 3/4 pressure injury  -as per prevention protocol  3. Left hallux unstageable pressure injury 1.5 x 1.5 dry, periwound dry mild erythema  4. Right hallux unstageable pressure injury 1.5 x 1.5 dry, periwound dry mild erythema  5. Right lateral foot unstageable pressure injury resolved callous, periwound dry mild erythema  6. Right lateral/plantar foot unstageable pressure injury resolved with callous dry, periwound dry mild erythema        PAST MEDICAL & SURGICAL HISTORY:  Dementia of frontal lobe type      Aphasic stroke      Diabetes mellitus      Respiratory failure      Hypertension      GERD (gastroesophageal reflux disease)      Constipation      Respiratory failure      CVA (cerebral vascular accident)      HTN (hypertension)      DM (diabetes mellitus)      Advanced dementia      COVID-19 virus detected      Quadriplegia      Pneumonia      Type II diabetes mellitus      Hx of appendectomy      Gastrostomy in place      Tracheostomy in place      Tracheostomy tube present      Feeding by G-tube      REVIEW OF SYSTEMS  Unable to obtain ROS  2/2 non-verbal status      MEDICATIONS  (STANDING):  ALBUTerol    90 MICROgram(s) HFA Inhaler 2 Puff(s) Inhalation every 6 hours  chlorhexidine 0.12% Liquid 15 milliLiter(s) Oral Mucosa every 12 hours  chlorhexidine 2% Cloths 1 Application(s) Topical two times a day  dexMEDEtomidine Infusion 0.5 MICROgram(s)/kG/Hr (6.14 mL/Hr) IV Continuous <Continuous>  dextrose 5%. 1000 milliLiter(s) (50 mL/Hr) IV Continuous <Continuous>  dextrose 5%. 1000 milliLiter(s) (100 mL/Hr) IV Continuous <Continuous>  dextrose 50% Injectable 25 Gram(s) IV Push once  dextrose 50% Injectable 12.5 Gram(s) IV Push once  dextrose 50% Injectable 25 Gram(s) IV Push once  glucagon  Injectable 1 milliGRAM(s) IntraMuscular once  heparin   Injectable 5000 Unit(s) SubCutaneous every 12 hours  insulin lispro (ADMELOG) corrective regimen sliding scale   SubCutaneous every 6 hours  ipratropium 17 MICROgram(s) HFA Inhaler 1 Puff(s) Inhalation every 6 hours  levoFLOXacin IVPB 750 milliGRAM(s) IV Intermittent every 48 hours  pantoprazole    Tablet 40 milliGRAM(s) Oral before breakfast  simethicone 80 milliGRAM(s) Chew every 12 hours  sodium chloride 0.9%. 1000 milliLiter(s) (80 mL/Hr) IV Continuous <Continuous>    MEDICATIONS  (PRN):  acetaminophen     Tablet .. 650 milliGRAM(s) Oral every 6 hours PRN Temp greater or equal to 38C (100.4F), Mild Pain (1 - 3)  aluminum hydroxide/magnesium hydroxide/simethicone Suspension 30 milliLiter(s) Oral every 4 hours PRN Dyspepsia  dextrose Oral Gel 15 Gram(s) Oral once PRN Blood Glucose LESS THAN 70 milliGRAM(s)/deciliter  LORazepam     Tablet 1 milliGRAM(s) Oral every 4 hours PRN Anxiety  melatonin 3 milliGRAM(s) Oral at bedtime PRN Insomnia  ondansetron Injectable 4 milliGRAM(s) IV Push every 8 hours PRN Nausea and/or Vomiting        Allergies    codeine (Hives)    Intolerances        SOCIAL HISTORY:      FAMILY HISTORY:      Vital Signs Last 24 Hrs  T(C): 37.2 (20 Oct 2022 09:00), Max: 37.2 (20 Oct 2022 04:00)  T(F): 99 (20 Oct 2022 09:00), Max: 99 (20 Oct 2022 09:00)  HR: 98 (20 Oct 2022 10:41) (83 - 124)  BP: 142/75 (20 Oct 2022 10:00) (93/59 - 143/65)  BP(mean): 92 (20 Oct 2022 10:00) (70 - 92)  RR: 20 (20 Oct 2022 10:00) (18 - 34)  SpO2: 100% (20 Oct 2022 10:41) (91% - 100%)    Parameters below as of 20 Oct 2022 10:00  Patient On (Oxygen Delivery Method): ventilator                            8.2    15.38 )-----------( 226      ( 20 Oct 2022 06:21 )             26.6     10-20    147<H>  |  109<H>  |  87<H>  ----------------------------<  236<H>  4.0   |  22  |  1.57<H>    Ca    8.3<L>      20 Oct 2022 06:21  Phos  3.4     10-20  Mg     3.0     10-19    TPro  6.6  /  Alb  1.6<L>  /  TBili  0.5  /  DBili  x   /  AST  44<H>  /  ALT  64<H>  /  AlkPhos  104  10-20      A1C with Estimated Average Glucose Result: 8.5 % (10-19-22 @ 09:05)  A1C with Estimated Average Glucose Result: 7.3 % (08-19-22 @ 06:36)       General: resting comfortably in bed; NAD  ENT: no nasal discharge;   Neck: trach in place/vented  Extremities: no gross deformities  Dermatologic: left buttock stage 4 palpable bone pressure injury 30%dark/ tan slough mild serosanguinous drainage No odor,  warmth, tenderness, induration, fluctuance  , bilateral hallux unstageable pressure injuries stable necrotic eschar no odor no warmth, no drainage, + erythema  Musculoskeletal/Vascular: foot drop/ contractured hands

## 2022-10-20 NOTE — PROGRESS NOTE ADULT - ASSESSMENT
-- DO NOT REPLY / DO NOT REPLY ALL --  -- Message is from the [facility]--    Referral Request  Name of Specialist: Can Silverman  Provider's specialty: Urology    Medical condition for referral:  Follow up    Is this a NEW request?: yes      Referral ordered by:       Insurance type: HMO      Payor: [employer] - [employer] / Plan: [employer] MEDICARE [employer]VANTAGE [employer] - [employer] / Product Type: [employer] Risk      Preferred Delivery Method   Fax - number to send to: [phone]    Caller Information       Type Contact Phone    11/02/2020 10:40 AM CST Phone (Incoming) Ross Mcallister (Self) [phone] (H)          Alternative phone number:[phone]      Turnaround time given to caller:   \"This message will be sent to [state Provider's full name]. The clinical team will return your call as soon as they review your message. Typically, it takes 3 business days to process referral requests.\"   The patient is a 76 year old female with a history of HTN, DM, CVA, dementia, chronic respiratory failure s/p trach, PEG, cardiac arrest, anemia, pleural effusions who presents with hypoxia.     Plan:  - ECG with no evidence of ischemia or infarction  - Echo 8/22 with normal LV systolic function, mod pulm HTN, IVC small/collapsible  - CT chest with small bilateral pleural effusions, GGO, and infiltrates  - Pleural effusions previously were exudative, likely parapneumonic  - Hold furosemide  - Procalcitonin significantly elevated  - IV antibiotics  - Hemoglobin improved after transfusion

## 2022-10-21 LAB
-  AMIKACIN: SIGNIFICANT CHANGE UP
-  AMOXICILLIN/CLAVULANIC ACID: SIGNIFICANT CHANGE UP
-  AMPICILLIN/SULBACTAM: SIGNIFICANT CHANGE UP
-  AMPICILLIN: SIGNIFICANT CHANGE UP
-  AZTREONAM: SIGNIFICANT CHANGE UP
-  CEFAZOLIN: SIGNIFICANT CHANGE UP
-  CEFEPIME: SIGNIFICANT CHANGE UP
-  CEFOXITIN: SIGNIFICANT CHANGE UP
-  CEFTRIAXONE: SIGNIFICANT CHANGE UP
-  CIPROFLOXACIN: SIGNIFICANT CHANGE UP
-  ERTAPENEM: SIGNIFICANT CHANGE UP
-  GENTAMICIN: SIGNIFICANT CHANGE UP
-  IMIPENEM: SIGNIFICANT CHANGE UP
-  LEVOFLOXACIN: SIGNIFICANT CHANGE UP
-  MEROPENEM: SIGNIFICANT CHANGE UP
-  NITROFURANTOIN: SIGNIFICANT CHANGE UP
-  PIPERACILLIN/TAZOBACTAM: SIGNIFICANT CHANGE UP
-  TIGECYCLINE: SIGNIFICANT CHANGE UP
-  TOBRAMYCIN: SIGNIFICANT CHANGE UP
-  TRIMETHOPRIM/SULFAMETHOXAZOLE: SIGNIFICANT CHANGE UP
ALBUMIN SERPL ELPH-MCNC: 1.4 G/DL — LOW (ref 3.3–5)
ALP SERPL-CCNC: 89 U/L — SIGNIFICANT CHANGE UP (ref 30–120)
ALT FLD-CCNC: 74 U/L DA — HIGH (ref 10–60)
ANION GAP SERPL CALC-SCNC: 10 MMOL/L — SIGNIFICANT CHANGE UP (ref 5–17)
AST SERPL-CCNC: 68 U/L — HIGH (ref 10–40)
BILIRUB SERPL-MCNC: 0.4 MG/DL — SIGNIFICANT CHANGE UP (ref 0.2–1.2)
BUN SERPL-MCNC: 69 MG/DL — HIGH (ref 7–23)
CALCIUM SERPL-MCNC: 8.1 MG/DL — LOW (ref 8.4–10.5)
CHLORIDE SERPL-SCNC: 112 MMOL/L — HIGH (ref 96–108)
CO2 SERPL-SCNC: 23 MMOL/L — SIGNIFICANT CHANGE UP (ref 22–31)
CREAT SERPL-MCNC: 1.37 MG/DL — HIGH (ref 0.5–1.3)
CULTURE RESULTS: SIGNIFICANT CHANGE UP
EGFR: 40 ML/MIN/1.73M2 — LOW
GLUCOSE BLDC GLUCOMTR-MCNC: 169 MG/DL — HIGH (ref 70–99)
GLUCOSE BLDC GLUCOMTR-MCNC: 174 MG/DL — HIGH (ref 70–99)
GLUCOSE BLDC GLUCOMTR-MCNC: 176 MG/DL — HIGH (ref 70–99)
GLUCOSE BLDC GLUCOMTR-MCNC: 188 MG/DL — HIGH (ref 70–99)
GLUCOSE BLDC GLUCOMTR-MCNC: 260 MG/DL — HIGH (ref 70–99)
GLUCOSE SERPL-MCNC: 153 MG/DL — HIGH (ref 70–99)
HCT VFR BLD CALC: 24.9 % — LOW (ref 34.5–45)
HGB BLD-MCNC: 7.4 G/DL — LOW (ref 11.5–15.5)
MCHC RBC-ENTMCNC: 27 PG — SIGNIFICANT CHANGE UP (ref 27–34)
MCHC RBC-ENTMCNC: 29.7 GM/DL — LOW (ref 32–36)
MCV RBC AUTO: 90.9 FL — SIGNIFICANT CHANGE UP (ref 80–100)
METHOD TYPE: SIGNIFICANT CHANGE UP
NRBC # BLD: 0 /100 WBCS — SIGNIFICANT CHANGE UP (ref 0–0)
ORGANISM # SPEC MICROSCOPIC CNT: SIGNIFICANT CHANGE UP
ORGANISM # SPEC MICROSCOPIC CNT: SIGNIFICANT CHANGE UP
PLATELET # BLD AUTO: 242 K/UL — SIGNIFICANT CHANGE UP (ref 150–400)
POTASSIUM SERPL-MCNC: 3.5 MMOL/L — SIGNIFICANT CHANGE UP (ref 3.5–5.3)
POTASSIUM SERPL-SCNC: 3.5 MMOL/L — SIGNIFICANT CHANGE UP (ref 3.5–5.3)
PROT SERPL-MCNC: 6.2 G/DL — SIGNIFICANT CHANGE UP (ref 6–8.3)
RBC # BLD: 2.74 M/UL — LOW (ref 3.8–5.2)
RBC # FLD: 17.2 % — HIGH (ref 10.3–14.5)
SODIUM SERPL-SCNC: 145 MMOL/L — SIGNIFICANT CHANGE UP (ref 135–145)
SPECIMEN SOURCE: SIGNIFICANT CHANGE UP
WBC # BLD: 10.36 K/UL — SIGNIFICANT CHANGE UP (ref 3.8–10.5)
WBC # FLD AUTO: 10.36 K/UL — SIGNIFICANT CHANGE UP (ref 3.8–10.5)

## 2022-10-21 PROCEDURE — 99233 SBSQ HOSP IP/OBS HIGH 50: CPT

## 2022-10-21 RX ORDER — CEFTRIAXONE 500 MG/1
1000 INJECTION, POWDER, FOR SOLUTION INTRAMUSCULAR; INTRAVENOUS ONCE
Refills: 0 | Status: COMPLETED | OUTPATIENT
Start: 2022-10-21 | End: 2022-10-21

## 2022-10-21 RX ORDER — CEFTRIAXONE 500 MG/1
1000 INJECTION, POWDER, FOR SOLUTION INTRAMUSCULAR; INTRAVENOUS EVERY 24 HOURS
Refills: 0 | Status: DISCONTINUED | OUTPATIENT
Start: 2022-10-22 | End: 2022-10-26

## 2022-10-21 RX ORDER — CEFTRIAXONE 500 MG/1
INJECTION, POWDER, FOR SOLUTION INTRAMUSCULAR; INTRAVENOUS
Refills: 0 | Status: DISCONTINUED | OUTPATIENT
Start: 2022-10-21 | End: 2022-10-26

## 2022-10-21 RX ADMIN — Medication 2: at 17:59

## 2022-10-21 RX ADMIN — SIMETHICONE 80 MILLIGRAM(S): 80 TABLET, CHEWABLE ORAL at 17:58

## 2022-10-21 RX ADMIN — HEPARIN SODIUM 5000 UNIT(S): 5000 INJECTION INTRAVENOUS; SUBCUTANEOUS at 17:58

## 2022-10-21 RX ADMIN — SODIUM CHLORIDE 80 MILLILITER(S): 9 INJECTION INTRAMUSCULAR; INTRAVENOUS; SUBCUTANEOUS at 17:59

## 2022-10-21 RX ADMIN — Medication 1 MILLIGRAM(S): at 17:58

## 2022-10-21 RX ADMIN — CHLORHEXIDINE GLUCONATE 1 APPLICATION(S): 213 SOLUTION TOPICAL at 17:59

## 2022-10-21 RX ADMIN — Medication 1 PUFF(S): at 20:24

## 2022-10-21 RX ADMIN — SODIUM CHLORIDE 80 MILLILITER(S): 9 INJECTION INTRAMUSCULAR; INTRAVENOUS; SUBCUTANEOUS at 05:06

## 2022-10-21 RX ADMIN — Medication 1 PUFF(S): at 07:03

## 2022-10-21 RX ADMIN — Medication 1 MILLIGRAM(S): at 08:08

## 2022-10-21 RX ADMIN — Medication 6: at 00:32

## 2022-10-21 RX ADMIN — Medication 1 MILLIGRAM(S): at 12:44

## 2022-10-21 RX ADMIN — Medication 1 PUFF(S): at 14:30

## 2022-10-21 RX ADMIN — Medication 3 MILLIGRAM(S): at 21:34

## 2022-10-21 RX ADMIN — Medication 1 MILLIGRAM(S): at 18:41

## 2022-10-21 RX ADMIN — ALBUTEROL 2 PUFF(S): 90 AEROSOL, METERED ORAL at 07:02

## 2022-10-21 RX ADMIN — SIMETHICONE 80 MILLIGRAM(S): 80 TABLET, CHEWABLE ORAL at 05:06

## 2022-10-21 RX ADMIN — Medication 1 PUFF(S): at 02:03

## 2022-10-21 RX ADMIN — Medication 1 APPLICATION(S): at 12:42

## 2022-10-21 RX ADMIN — Medication 2: at 05:06

## 2022-10-21 RX ADMIN — ALBUTEROL 2 PUFF(S): 90 AEROSOL, METERED ORAL at 20:24

## 2022-10-21 RX ADMIN — CEFTRIAXONE 100 MILLIGRAM(S): 500 INJECTION, POWDER, FOR SOLUTION INTRAMUSCULAR; INTRAVENOUS at 14:07

## 2022-10-21 RX ADMIN — MIDODRINE HYDROCHLORIDE 10 MILLIGRAM(S): 2.5 TABLET ORAL at 21:34

## 2022-10-21 RX ADMIN — Medication 1 MILLIGRAM(S): at 23:50

## 2022-10-21 RX ADMIN — Medication 1 MILLIGRAM(S): at 03:00

## 2022-10-21 RX ADMIN — CHLORHEXIDINE GLUCONATE 15 MILLILITER(S): 213 SOLUTION TOPICAL at 17:59

## 2022-10-21 RX ADMIN — ALBUTEROL 2 PUFF(S): 90 AEROSOL, METERED ORAL at 02:03

## 2022-10-21 RX ADMIN — Medication 1 MILLIGRAM(S): at 23:13

## 2022-10-21 RX ADMIN — CHLORHEXIDINE GLUCONATE 15 MILLILITER(S): 213 SOLUTION TOPICAL at 05:06

## 2022-10-21 RX ADMIN — Medication 2: at 12:42

## 2022-10-21 RX ADMIN — MIDODRINE HYDROCHLORIDE 10 MILLIGRAM(S): 2.5 TABLET ORAL at 14:06

## 2022-10-21 RX ADMIN — ALBUTEROL 2 PUFF(S): 90 AEROSOL, METERED ORAL at 14:30

## 2022-10-21 RX ADMIN — MIDODRINE HYDROCHLORIDE 10 MILLIGRAM(S): 2.5 TABLET ORAL at 05:06

## 2022-10-21 RX ADMIN — HEPARIN SODIUM 5000 UNIT(S): 5000 INJECTION INTRAVENOUS; SUBCUTANEOUS at 05:05

## 2022-10-21 RX ADMIN — PANTOPRAZOLE SODIUM 40 MILLIGRAM(S): 20 TABLET, DELAYED RELEASE ORAL at 05:06

## 2022-10-21 RX ADMIN — CHLORHEXIDINE GLUCONATE 1 APPLICATION(S): 213 SOLUTION TOPICAL at 05:06

## 2022-10-21 RX ADMIN — Medication 2: at 23:39

## 2022-10-21 NOTE — PROGRESS NOTE ADULT - ASSESSMENT
Impression - 75 y/o female, FULL CODE, with dementia, COPD with chronic resp failure and is vent dependent, s/p Trach/PEG, hx of cardiac arrest, CHF, cor pulmonale, CVA, COVID-19 infxn, CKD, anemia, multiple admissions for respiratory distress (last admission from 6/1-6/9  and now august diagnosed with PNA with parapneumonic pleural effusion s/p course of Ertapenem who presents from [facility] for respiratory distress . Patient not verbal, demented unable to obtain any hx. Patient was admitted to the SPCU for acute on chronic hypoxic respiratory failure 2/2 to pna, sepsis, chronic anemia, RYAN, hyperK, hyperglycemia and mild hypoNa. Transfused PRBC x 2. 10/21 - Patient's Hgb 7.4<--8.2, CT ABD/Pel significant for moderate B/L pleural effusions with atelectasis, but did not reveal retroperitoneal hemorrhage.    1. Acute on chronic hypoxemic resp failure  2. Acute and chronic anemia  3. RYAN on CKD, improving  4. Hyperkalemia, improving  5. PNA  6. Sepsis  7. UTI    -Neuro: AAOx0 at baseline/non-verbal, continue ativan TID. Starting precedex for acute agitation. When she becomes agitated, she then is tachypneic and the vent dyssynchrony causes her to derecruit and become acutely hypoxemic.  -Cardiac: HD Stable  -Resp: Remains on assist control. Evaluating to change settings or mode to aid with aveolar recruitment and minimize hypoxia events. CT AP held tonight due to instability.    -GI: NPO, PPI  -Renal: Strict Is and Os. Renal Dose meds  -ID: cultures pending. presumed PNA and Sepsis  -Endo: glucose control goal FSG <180  -Heme:  received transfusion. Remains anemia. Concern for GI bleed for other source of bleeding. Pending CT when more stable.   -Dispo: Pt is full code and remains in the SPCU   Impression - 75 y/o female, FULL CODE, with dementia, COPD with chronic resp failure and is vent dependent, s/p Trach/PEG, hx of cardiac arrest, CHF, cor pulmonale, CVA, COVID-19 infxn, CKD, anemia, multiple admissions for respiratory distress (last admission from 6/1-6/9  and now august diagnosed with PNA with parapneumonic pleural effusion s/p course of Ertapenem who presents from [facility] for respiratory distress . Patient not verbal, demented unable to obtain any hx. Patient was admitted to the SPCU for acute on chronic hypoxic respiratory failure 2/2 to pna, sepsis, chronic anemia, RYAN, hyperK, hyperglycemia and mild hypoNa. Transfused PRBC x 2. 10/21 - Patient's Hgb 7.4<--8.2, CT ABD/Pel significant for moderate B/L pleural effusions with atelectasis, but did not reveal retroperitoneal hemorrhage.    1. Acute on chronic hypoxemic resp failure  2. Acute and chronic anemia  3. RYAN on CKD, improving  4. Hyperkalemia, improving  5. PNA  6. Sepsis  7. UTI    Neuro - AAOx0 at baseline/non-verbal, continue ativan TID. D/C precedex for now, will continue to monitor closely and consider PRN IVP and/or gtt sedation for vent synchrony.  Cardiac - Hgb 7.4<--8.2, trend cbc, transfuse PRN, trend lytes, replete PRN, hyper K+ improved now on lower end, prior TTE from 8/22 normal LV systolic function w/moderate PAH, cardiology following and input appreciated   Resp - Continues on full vent support w/6cc/kg of IBW per ARDS net protocol, actively titrating FIO2 w/goal SPO2>90%, utilization of PEEP for alveolar recruitment, trend ABG, chest PT, likely parapneumonic B/L pleural effusions    GI - Keep NPO except meds, daily PPI, PRN Zofran for N/V, PRN bowel regimen  Renal - RYAN improving, strict I/Os, +vaughn cath, IVF, goal UOP .5cc/kg, avoid nephrotoxic agents, renal dose meds  Endo - ISS protocol initiated, strict glucose control w/goal BG <180, A1c 8.5  Heme - DVT PPx w/UFH SQ, SCDs  ID - Leukocytosis improved, continue to trend CBC/fevers, antipyretics PRN, UCx+ E coli - start ceftriaxone 1G Q24/hrs, BCx NGT, continue levofloxacin 750 IVPB Q48/hrs for PNA, ID following and input appreciated      Impression - 75 y/o female, FULL CODE, with dementia, COPD with chronic resp failure and is vent dependent, s/p Trach/PEG, hx of cardiac arrest, CHF, cor pulmonale, CVA, COVID-19 infxn, CKD, anemia, multiple admissions for respiratory distress (last admission from 6/1-6/9  and now august diagnosed with PNA with parapneumonic pleural effusion s/p course of Ertapenem who presents from [facility] for respiratory distress . Patient not verbal, demented unable to obtain any hx. Patient was admitted to the SPCU for acute on chronic hypoxic respiratory failure 2/2 to pna, sepsis, chronic anemia, RYAN, hyperK, hyperglycemia and mild hypoNa. Transfused PRBC x 2. 10/21 - Patient's Hgb 7.4<--8.2, CT ABD/Pel significant for moderate B/L pleural effusions with atelectasis, but did not reveal retroperitoneal hemorrhage.    1. Acute on chronic hypoxemic resp failure  2. Acute and chronic anemia  3. RYAN on CKD, improving  4. Hyperkalemia, improving  5. PNA/VAP  6. Sepsis  7. UTI    Neuro - AAOx0 at baseline/non-verbal, continue ativan TID. D/C precedex for now, will continue to monitor closely and consider PRN IVP and/or gtt sedation for vent synchrony.  Cardiac - Hgb 7.4<--8.2, trend cbc, transfuse PRN, trend lytes, replete PRN, hyper K+ improved now on lower end, prior TTE from 8/22 normal LV systolic function w/moderate PAH, cardiology following and input appreciated   Resp - Continues on full vent support w/6cc/kg of IBW per ARDS net protocol, actively titrating FIO2 w/goal SPO2>90%, utilization of PEEP for alveolar recruitment, trend ABG, chest PT, likely parapneumonic B/L pleural effusions    GI - Keep NPO except meds, daily PPI, PRN Zofran for N/V, PRN bowel regimen  Renal - RYAN improving, strict I/Os, +vaughn cath, IVF, goal UOP .5cc/kg, avoid nephrotoxic agents, renal dose meds  Endo - ISS protocol initiated, strict glucose control w/goal BG <180, A1c 8.5  Heme - DVT PPx w/UFH SQ, SCDs  ID - Leukocytosis improved, continue to trend CBC/fevers, antipyretics PRN, UCx+ E coli - start ceftriaxone 1G Q24/hrs, BCx NGT, continue levofloxacin 750 IVPB Q48/hrs for PNA, ID following and input appreciated

## 2022-10-21 NOTE — PROGRESS NOTE ADULT - ASSESSMENT
76F with dementia, COPD with chronic resp failure and is vent dependent, s/p PEG, hx of cardiac arrest, CHF, cor pulmonale, CVA, COVID-19 infxn, CKD, anemia, multiple admissions    dementia  COPD  chr resp failure  vegetative state  dysphagia  peg  hx of card arrest - anoxic brain  CHF  cva    TLC placed  on emp ABX  CCM notes reviewed  vs noted  labs reviewed  Wound care in progress -     Diagnosis; Prognosis; MOLST Discussed  Marciano -   HCP  pt is full code  spoke with  -

## 2022-10-21 NOTE — PROGRESS NOTE ADULT - SUBJECTIVE AND OBJECTIVE BOX
[facility], Division of Infectious Diseases  MOIZ Lora Y. Patel, S. Shah, G. [facility]  [phone]    Name: BREA BECKHAM  Age: 76y  Gender: Female  MRN: [id number]    Interval History:  Patient seen and examined at bedside in SPCU  No acute overnight events. Afebrile  Continues to remain vented  Notes reviewed    Antibiotics:  levoFLOXacin IVPB 750 milliGRAM(s) IV Intermittent every 48 hours      Medications:  acetaminophen     Tablet .. 650 milliGRAM(s) Oral every 6 hours PRN  ALBUTerol    90 MICROgram(s) HFA Inhaler 2 Puff(s) Inhalation every 6 hours  aluminum hydroxide/magnesium hydroxide/simethicone Suspension 30 milliLiter(s) Oral every 4 hours PRN  cadexomer iodine 0.9% Gel 1 Application(s) Topical <User Schedule>  chlorhexidine 0.12% Liquid 15 milliLiter(s) Oral Mucosa every 12 hours  chlorhexidine 2% Cloths 1 Application(s) Topical two times a day  dexMEDEtomidine Infusion 0.5 MICROgram(s)/kG/Hr IV Continuous <Continuous>  dextrose 5%. 1000 milliLiter(s) IV Continuous <Continuous>  dextrose 5%. 1000 milliLiter(s) IV Continuous <Continuous>  dextrose 50% Injectable 25 Gram(s) IV Push once  dextrose 50% Injectable 12.5 Gram(s) IV Push once  dextrose 50% Injectable 25 Gram(s) IV Push once  dextrose Oral Gel 15 Gram(s) Oral once PRN  glucagon  Injectable 1 milliGRAM(s) IntraMuscular once  heparin   Injectable 5000 Unit(s) SubCutaneous every 12 hours  insulin lispro (ADMELOG) corrective regimen sliding scale   SubCutaneous every 6 hours  ipratropium 17 MICROgram(s) HFA Inhaler 1 Puff(s) Inhalation every 6 hours  levoFLOXacin IVPB 750 milliGRAM(s) IV Intermittent every 48 hours  LORazepam     Tablet 1 milliGRAM(s) Oral every 6 hours  LORazepam   Injectable 1 milliGRAM(s) IV Push every 4 hours PRN  melatonin 3 milliGRAM(s) Oral at bedtime PRN  midodrine 10 milliGRAM(s) Oral every 8 hours  ondansetron Injectable 4 milliGRAM(s) IV Push every 8 hours PRN  pantoprazole    Tablet 40 milliGRAM(s) Oral before breakfast  povidone iodine 10% Solution 1 Application(s) Topical daily  simethicone 80 milliGRAM(s) Chew every 12 hours  sodium chloride 0.9%. 1000 milliLiter(s) IV Continuous <Continuous>      Review of Systems:  unable to obtain    Allergies: codeine (Hives)    For details regarding the patient's past medical history, social history, family history, and other miscellaneous elements, please refer the initial infectious diseases consultation and/or the admitting history and physical examination for this admission.    Objective:  Vitals:   T(C): 36.5 (10-21-22 @ 12:00), Max: 37.8 (10-21-22 @ 08:05)  HR: 78 (10-21-22 @ 11:05) (68 - 95)  BP: 140/74 (10-21-22 @ 10:00) (88/55 - 140/74)  RR: 29 (10-21-22 @ 10:00) (16 - 36)  SpO2: 96% (10-21-22 @ 11:05) (91% - 100%)    Physical Examination:  General: NAD  HEENT: NC/AT, EOMI,   Neck: trach to vent  Lungs: MV breath sounds  Heart: Regular rate and rhythm. No murmur, rub or gallop.  Abdomen: Soft. Nondistended. Nontender. B  Skin: Warm. Dry.        Laboratory Studies:  CBC:                       7.4    10.36 )-----------( 242      ( 21 Oct 2022 06:30 )             24.9     CMP: 10-21    145  |  112<H>  |  69<H>  ----------------------------<  153<H>  3.5   |  23  |  1.37<H>    Ca    8.1<L>      21 Oct 2022 06:30  Phos  3.4     10-20    TPro  6.2  /  Alb  1.4<L>  /  TBili  0.4  /  DBili  x   /  AST  68<H>  /  ALT  74<H>  /  AlkPhos  89  10-21    LIVER FUNCTIONS - ( 21 Oct 2022 06:30 )  Alb: 1.4 g/dL / Pro: 6.2 g/dL / ALK PHOS: 89 U/L / ALT: 74 U/L DA / AST: 68 U/L / GGT: x               Microbiology: reviewed    Culture - Sputum (collected 10-19-22 @ 00:58)  Source: .Sputum Sputum trap  Gram Stain (10-19-22 @ 19:24):    Few Squamous epithelial cells per low power field    Few polymorphonuclear leukocytes per low power field    Few Gram positive cocci in pairs per oil power field  Preliminary Report (10-20-22 @ 20:02):    Moderate Mixed gram negative rods including    Moderate Stenotrophomonas maltophilia    Culture - Urine (collected 10-18-22 @ 12:27)  Source: Clean Catch Clean Catch (Midstream)  Final Report (10-21-22 @ 08:18):    >100,000 CFU/ml Escherichia coli  Organism: Escherichia coli (10-21-22 @ 08:18)  Organism: Escherichia coli (10-21-22 @ 08:18)      -  Amikacin: S <=16      -  Amoxicillin/Clavulanic Acid: S <=8/4      -  Ampicillin: R >16 These ampicillin results predict results for amoxicillin      -  Ampicillin/Sulbactam: I 16/8 Enterobacter, Klebsiella aerogenes, Citrobacter, and Serratia may develop resistance during prolonged therapy (3-4 days)      -  Aztreonam: S <=4      -  Cefazolin: S <=2 For uncomplicated UTI with K. pneumoniae, E. coli, or P. mirablis: PRATIK <=16 is sensitive and PRATIK >=32 is resistant. This also predicts results for oral agents cefaclor, cefdinir, cefpodoxime, cefprozil, cefuroxime axetil, cephalexin and locarbef for uncomplicated UTI. Note that some isolates may be susceptible to these agents while testing resistant to cefazolin.      -  Cefepime: S <=2      -  Cefoxitin: S <=8      -  Ceftriaxone: S <=1 Enterobacter, Klebsiella aerogenes, Citrobacter, and Serratia may develop resistance during prolonged therapy      -  Ciprofloxacin: R >2      -  Ertapenem: I 1      -  Gentamicin: R >8      -  Imipenem: S <=1      -  Levofloxacin: R >4      -  Meropenem: S <=1      -  Nitrofurantoin: S <=32 Should not be used to treat pyelonephritis      -  Piperacillin/Tazobactam: S <=8      -  Tigecycline: S <=2      -  Tobramycin: I 8      -  Trimethoprim/Sulfamethoxazole: R >2/38      Method Type: PRATIK    Culture - Blood (collected 10-18-22 @ 12:00)  Source: .Blood Blood-Peripheral  Preliminary Report (10-19-22 @ 17:01):    No growth to date.    Culture - Blood (collected 10-18-22 @ 12:00)  Source: .Blood Blood-Peripheral  Preliminary Report (10-19-22 @ 17:01):    No growth to date.          Radiology: reviewed

## 2022-10-21 NOTE — PROGRESS NOTE ADULT - SUBJECTIVE AND OBJECTIVE BOX
Patient is a 76y old  Female who presents with a chief complaint of Acute on Hypoxemic respiratory failure (21 Oct 2022 07:06)      INTERVAL HPI/OVERNIGHT EVENTS:    no overnight events    MEDICATIONS  (STANDING):  ALBUTerol    90 MICROgram(s) HFA Inhaler 2 Puff(s) Inhalation every 6 hours  cadexomer iodine 0.9% Gel 1 Application(s) Topical <User Schedule>  chlorhexidine 0.12% Liquid 15 milliLiter(s) Oral Mucosa every 12 hours  chlorhexidine 2% Cloths 1 Application(s) Topical two times a day  dexMEDEtomidine Infusion 0.5 MICROgram(s)/kG/Hr (6.14 mL/Hr) IV Continuous <Continuous>  dextrose 5%. 1000 milliLiter(s) (50 mL/Hr) IV Continuous <Continuous>  dextrose 5%. 1000 milliLiter(s) (100 mL/Hr) IV Continuous <Continuous>  dextrose 50% Injectable 25 Gram(s) IV Push once  dextrose 50% Injectable 12.5 Gram(s) IV Push once  dextrose 50% Injectable 25 Gram(s) IV Push once  glucagon  Injectable 1 milliGRAM(s) IntraMuscular once  heparin   Injectable 5000 Unit(s) SubCutaneous every 12 hours  insulin lispro (ADMELOG) corrective regimen sliding scale   SubCutaneous every 6 hours  ipratropium 17 MICROgram(s) HFA Inhaler 1 Puff(s) Inhalation every 6 hours  levoFLOXacin IVPB 750 milliGRAM(s) IV Intermittent every 48 hours  LORazepam     Tablet 1 milliGRAM(s) Oral every 6 hours  midodrine 10 milliGRAM(s) Oral every 8 hours  pantoprazole    Tablet 40 milliGRAM(s) Oral before breakfast  povidone iodine 10% Solution 1 Application(s) Topical daily  simethicone 80 milliGRAM(s) Chew every 12 hours  sodium chloride 0.9%. 1000 milliLiter(s) (80 mL/Hr) IV Continuous <Continuous>    MEDICATIONS  (PRN):  acetaminophen     Tablet .. 650 milliGRAM(s) Oral every 6 hours PRN Temp greater or equal to 38C (100.4F), Mild Pain (1 - 3)  aluminum hydroxide/magnesium hydroxide/simethicone Suspension 30 milliLiter(s) Oral every 4 hours PRN Dyspepsia  dextrose Oral Gel 15 Gram(s) Oral once PRN Blood Glucose LESS THAN 70 milliGRAM(s)/deciliter  LORazepam   Injectable 1 milliGRAM(s) IV Push every 4 hours PRN Agitation  melatonin 3 milliGRAM(s) Oral at bedtime PRN Insomnia  ondansetron Injectable 4 milliGRAM(s) IV Push every 8 hours PRN Nausea and/or Vomiting      Allergies    codeine (Hives)    Intolerances        REVIEW OF SYSTEMS:  unable to obtain 2/2 mental status    Vital Signs Last 24 Hrs  T(C): 36.1 (21 Oct 2022 04:23), Max: 37.3 (20 Oct 2022 12:46)  T(F): 97 (21 Oct 2022 04:23), Max: 99.1 (20 Oct 2022 12:46)  HR: 72 (21 Oct 2022 07:11) (68 - 103)  BP: 105/61 (21 Oct 2022 06:00) (88/55 - 142/75)  BP(mean): 76 (21 Oct 2022 06:00) (66 - 92)  RR: 24 (21 Oct 2022 06:00) (16 - 34)  SpO2: 99% (21 Oct 2022 07:11) (94% - 100%)    Parameters below as of 21 Oct 2022 07:11  Patient On (Oxygen Delivery Method): ventilator,.60        PHYSICAL EXAM:  General: NAD  HEENT: NC/AT, EOMI,   Neck: trach to vent  Lungs: MV breath sounds  Heart: Regular rate and rhythm. No murmur, rub or gallop.  Abdomen: Soft. Nondistended. Nontender.   Skin: Warm. Dry.    LABS:                        7.4    10.36 )-----------( 242      ( 21 Oct 2022 06:30 )             24.9     21 Oct 2022 06:30    145    |  112    |  69     ----------------------------<  153    3.5     |  23     |  1.37     Ca    8.1        21 Oct 2022 06:30    TPro  6.2    /  Alb  1.4    /  TBili  0.4    /  DBili  x      /  AST  68     /  ALT  74     /  AlkPhos  89     21 Oct 2022 06:30    PT/INR - ( 20 Oct 2022 06:21 )   PT: 14.8 sec;   INR: 1.28 ratio             CAPILLARY BLOOD GLUCOSE      POCT Blood Glucose.: 176 mg/dL (21 Oct 2022 05:05)  POCT Blood Glucose.: 260 mg/dL (21 Oct 2022 00:29)  POCT Blood Glucose.: 185 mg/dL (20 Oct 2022 16:58)  POCT Blood Glucose.: 248 mg/dL (20 Oct 2022 12:34)      RADIOLOGY & ADDITIONAL TESTS:

## 2022-10-21 NOTE — PROGRESS NOTE ADULT - SUBJECTIVE AND OBJECTIVE BOX
Patient is a 76y old  Female who presents with a chief complaint of Acute on Hypoxemic respiratory failure (21 Oct 2022 13:07)      BRIEF HOSPITAL COURSE: 76F with dementia, COPD with chronic resp failure and is vent dependent, s/p Trach/PEG, hx of cardiac arrest, CHF, cor pulmonale, CVA, COVID-19 infxn, CKD, anemia, multiple admissions for respiratory distress (last admission from 6/1-6/9  and now august diagnosed with PNA with parapneumonic pleural effusion s/p course of Ertapenem who presents from [facility] for respiratory distress . Patient not verbal, demented unable to obtain any hx. Patient was admitted to the [facility] for acute on chronic hypoxic respiratory failure 2/2 to pna, sepsis, chronic anemia, RYAN, hyperK, hyperglycemia and mild hypoNa. Transfused PRBC x 2.      Events last 24 hours: Patient's Hgb 7.4<--8.2, CT ABD/Pel significant for moderate B/L pleural effusions with atelectasis, but did not reveal retroperitoneal hemorrhage. No additional events reported throughout the day.    ROS unable to perform 2/2 to patient's current state     PAST MEDICAL & SURGICAL HISTORY:  Dementia of frontal lobe type      Aphasic stroke      Diabetes mellitus      Respiratory failure      Hypertension      GERD (gastroesophageal reflux disease)      Constipation      Respiratory failure      CVA (cerebral vascular accident)      HTN (hypertension)      DM (diabetes mellitus)      Advanced dementia      COVID-19 virus detected      Quadriplegia      Pneumonia      Type II diabetes mellitus      Hx of appendectomy      Gastrostomy in place      Tracheostomy in place      Tracheostomy tube present      Feeding by G-tube            Medications:  cefTRIAXone   IVPB      levoFLOXacin IVPB 750 milliGRAM(s) IV Intermittent every 48 hours    midodrine 10 milliGRAM(s) Oral every 8 hours    ALBUTerol    90 MICROgram(s) HFA Inhaler 2 Puff(s) Inhalation every 6 hours  ipratropium 17 MICROgram(s) HFA Inhaler 1 Puff(s) Inhalation every 6 hours    acetaminophen     Tablet .. 650 milliGRAM(s) Oral every 6 hours PRN  dexMEDEtomidine Infusion 0.5 MICROgram(s)/kG/Hr IV Continuous <Continuous>  LORazepam     Tablet 1 milliGRAM(s) Oral every 6 hours  LORazepam   Injectable 1 milliGRAM(s) IV Push every 4 hours PRN  melatonin 3 milliGRAM(s) Oral at bedtime PRN  ondansetron Injectable 4 milliGRAM(s) IV Push every 8 hours PRN      heparin   Injectable 5000 Unit(s) SubCutaneous every 12 hours    aluminum hydroxide/magnesium hydroxide/simethicone Suspension 30 milliLiter(s) Oral every 4 hours PRN  pantoprazole    Tablet 40 milliGRAM(s) Oral before breakfast  simethicone 80 milliGRAM(s) Chew every 12 hours      dextrose 50% Injectable 25 Gram(s) IV Push once  dextrose 50% Injectable 12.5 Gram(s) IV Push once  dextrose 50% Injectable 25 Gram(s) IV Push once  dextrose Oral Gel 15 Gram(s) Oral once PRN  glucagon  Injectable 1 milliGRAM(s) IntraMuscular once  insulin lispro (ADMELOG) corrective regimen sliding scale   SubCutaneous every 6 hours    dextrose 5%. 1000 milliLiter(s) IV Continuous <Continuous>  dextrose 5%. 1000 milliLiter(s) IV Continuous <Continuous>  sodium chloride 0.9%. 1000 milliLiter(s) IV Continuous <Continuous>      cadexomer iodine 0.9% Gel 1 Application(s) Topical <User Schedule>  chlorhexidine 0.12% Liquid 15 milliLiter(s) Oral Mucosa every 12 hours  chlorhexidine 2% Cloths 1 Application(s) Topical two times a day  povidone iodine 10% Solution 1 Application(s) Topical daily        Mode: AC/ CMV (Assist Control/ Continuous Mandatory Ventilation)  RR (machine): 18  TV (machine): 400  FiO2: 60  PEEP: 5  ITime: 1  MAP: 18  PIP: 30      ICU Vital Signs Last 24 Hrs  T(C): 36.7 (21 Oct 2022 16:00), Max: 37.8 (21 Oct 2022 08:05)  T(F): 98 (21 Oct 2022 16:00), Max: 100 (21 Oct 2022 08:05)  HR: 87 (21 Oct 2022 18:00) (68 - 88)  BP: 126/77 (21 Oct 2022 18:00) (88/55 - 141/77)  BP(mean): 91 (21 Oct 2022 18:00) (66 - 97)  ABP: --  ABP(mean): --  RR: 26 (21 Oct 2022 18:00) (16 - 36)  SpO2: 100% (21 Oct 2022 18:00) (91% - 100%)    O2 Parameters below as of 21 Oct 2022 18:00  Patient On (Oxygen Delivery Method): ventilator                I&O's Detail    20 Oct 2022 07:01  -  21 Oct 2022 07:00  --------------------------------------------------------  IN:    IV PiggyBack: 250 mL    sodium chloride 0.9%: 1920 mL  Total IN: 2170 mL    OUT:    Dexmedetomidine: 0 mL    Indwelling Catheter - Urethral (mL): 1300 mL  Total OUT: 1300 mL    Total NET: 870 mL      21 Oct 2022 07:01  -  21 Oct 2022 19:19  --------------------------------------------------------  IN:    IV PiggyBack: 50 mL    sodium chloride 0.9%: 480 mL  Total IN: 530 mL    OUT:    Indwelling Catheter - Urethral (mL): 400 mL  Total OUT: 400 mL    Total NET: 130 mL            LABS:                        7.4    10.36 )-----------( 242      ( 21 Oct 2022 06:30 )             24.9     10-21    145  |  112<H>  |  69<H>  ----------------------------<  153<H>  3.5   |  23  |  1.37<H>    Ca    8.1<L>      21 Oct 2022 06:30  Phos  3.4     10-20    TPro  6.2  /  Alb  1.4<L>  /  TBili  0.4  /  DBili  x   /  AST  68<H>  /  ALT  74<H>  /  AlkPhos  89  10-21          CAPILLARY BLOOD GLUCOSE      POCT Blood Glucose.: 174 mg/dL (21 Oct 2022 17:57)    PT/INR - ( 20 Oct 2022 06:21 )   PT: 14.8 sec;   INR: 1.28 ratio             CULTURES:  Culture Results:   Moderate Mixed gram negative rods including  Moderate Stenotrophomonas maltophilia (10-19 @ 00:58)  Culture Results:   >100,000 CFU/ml Escherichia coli (10-18 @ 12:27)  Culture Results:   No growth to date. (10-18 @ 12:00)  Culture Results:   No growth to date. (10-18 @ 12:00)  Rapid RVP Result: NotDetec (10-18 @ 11:30)      Physical Examination:    General: No acute distress. +Trach/PEG    HEENT: Atraumatic, Normocephalic, pupils equal, reactive to light.  Symmetric.    PULM: Diminished / coarse breath sounds B/L    NECK: Supple, +Trach, RT IJ CVC    CVS: Regular rate and rhythm, no murmurs, rubs, or gallops    ABD: Soft, nondistended, nontender, no rebound or guarding, +PEG     EXT: No edema, nontender    SKIN: Warm and well perfused    NEURO: Demented / non-verbal     DEVICES:     RADIOLOGY:   < from: CT Abdomen and Pelvis No Cont (10.20.22 @ 22:43) >  ACC: [id number] EXAM:  CT ABDOMEN AND PELVIS                          PROCEDURE DATE:  10/20/2022          INTERPRETATION:  CLINICAL INFORMATION: 76 years  Female with ro rp bleed.    COMPARISON: 8/20/2022    CONTRAST/COMPLICATIONS:  IV Contrast: NONE  Oral Contrast: NONE  Complications: None reported at time of study completion    PROCEDURE:  CT of the Abdomen and Pelvis was performed.  Sagittal and coronal reformats were performed.    FINDINGS:  LOWER CHEST: Moderate bilateral pleural effusionswith underlying passive   atelectasis, unchanged. Right central line tip in the superior vena cava.    LIVER: Within normal limits.  BILE DUCTS: Normal caliber.  GALLBLADDER: Within normal limits.  SPLEEN: Within normal limits.  PANCREAS: Within normal limits.  ADRENALS: Within normal limits.  KIDNEYS/URETERS: Within normal limits.    BLADDER: Collapsed around Woody catheter.  REPRODUCTIVE ORGANS: Unremarkable uterus.    BOWEL: No bowel obstruction. Appendix is normal.. Gastrostomy tube   balloonin the stomach. Mildly distended rectum with liquid stool  PERITONEUM: Trace ascites.  VESSELS: Within normal limits.  RETROPERITONEUM/LYMPH NODES: No lymphadenopathy. No retroperitoneal   hemorrhage.  ABDOMINAL WALL: Within normal limits.  BONES: Moderate chronic L2 compression fracture deformity. Stable   deformity of the left proximal femur.    IMPRESSION:    No retroperitoneal hemorrhage.    Stable moderate bilateral pleural effusions with underlying compressive   atelectasis.    Small ascites.    Mildly distended rectum with liquid stool.        A preliminary report was provided by Dr. José Magaña. I agree with   the interpretation.    --- End of Report ---    ALISON ANDERSON MD; Attending Radiologist  This document has been electronically signed. Oct 21 2022  9:57AM      Critical Care time: 35 mins assessing presenting problems of acute illness that poses high probability of life threatening deterioration or end organ damage/dysfunction.  Medical decision making including initiating plan of care, reviewing data, reviewing radiology, direct patient bedside evaluation and interpretation of vital signs, any necessary ventilator management, discussion with multidisciplinary team, discussing goals of care with patient/family, all non inclusive of procedures Patient is a 76y old  Female who presents with a chief complaint of Acute on Hypoxemic respiratory failure (21 Oct 2022 13:07)      BRIEF HOSPITAL COURSE: 76F with dementia, COPD with chronic resp failure and is vent dependent, s/p Trach/PEG, hx of cardiac arrest, CHF, cor pulmonale, CVA, COVID-19 infxn, CKD, anemia, multiple admissions for respiratory distress (last admission from 6/1-6/9  and now august diagnosed with PNA with parapneumonic pleural effusion s/p course of Ertapenem who presents from [facility] for respiratory distress . Patient not verbal, demented unable to obtain any hx. Patient was admitted to the SPCU for acute on chronic hypoxic respiratory failure 2/2 to pna, sepsis, chronic anemia, RYAN, hyperK, hyperglycemia and mild hypoNa. Transfused PRBC x 2.      Events last 24 hours: Patient's Hgb 7.4<--8.2, CT ABD/Pel significant for moderate B/L pleural effusions with atelectasis, but did not reveal retroperitoneal hemorrhage. No additional events reported throughout the day.    ROS unable to perform 2/2 to patient's current state     PAST MEDICAL & SURGICAL HISTORY:  Dementia of frontal lobe type      Aphasic stroke      Diabetes mellitus      Respiratory failure      Hypertension      GERD (gastroesophageal reflux disease)      Constipation      Respiratory failure      CVA (cerebral vascular accident)      HTN (hypertension)      DM (diabetes mellitus)      Advanced dementia      COVID-19 virus detected      Quadriplegia      Pneumonia      Type II diabetes mellitus      Hx of appendectomy      Gastrostomy in place      Tracheostomy in place      Tracheostomy tube present      Feeding by G-tube            Medications:  cefTRIAXone   IVPB      levoFLOXacin IVPB 750 milliGRAM(s) IV Intermittent every 48 hours    midodrine 10 milliGRAM(s) Oral every 8 hours    ALBUTerol    90 MICROgram(s) HFA Inhaler 2 Puff(s) Inhalation every 6 hours  ipratropium 17 MICROgram(s) HFA Inhaler 1 Puff(s) Inhalation every 6 hours    acetaminophen     Tablet .. 650 milliGRAM(s) Oral every 6 hours PRN  dexMEDEtomidine Infusion 0.5 MICROgram(s)/kG/Hr IV Continuous <Continuous>  LORazepam     Tablet 1 milliGRAM(s) Oral every 6 hours  LORazepam   Injectable 1 milliGRAM(s) IV Push every 4 hours PRN  melatonin 3 milliGRAM(s) Oral at bedtime PRN  ondansetron Injectable 4 milliGRAM(s) IV Push every 8 hours PRN      heparin   Injectable 5000 Unit(s) SubCutaneous every 12 hours    aluminum hydroxide/magnesium hydroxide/simethicone Suspension 30 milliLiter(s) Oral every 4 hours PRN  pantoprazole    Tablet 40 milliGRAM(s) Oral before breakfast  simethicone 80 milliGRAM(s) Chew every 12 hours      dextrose 50% Injectable 25 Gram(s) IV Push once  dextrose 50% Injectable 12.5 Gram(s) IV Push once  dextrose 50% Injectable 25 Gram(s) IV Push once  dextrose Oral Gel 15 Gram(s) Oral once PRN  glucagon  Injectable 1 milliGRAM(s) IntraMuscular once  insulin lispro (ADMELOG) corrective regimen sliding scale   SubCutaneous every 6 hours    dextrose 5%. 1000 milliLiter(s) IV Continuous <Continuous>  dextrose 5%. 1000 milliLiter(s) IV Continuous <Continuous>  sodium chloride 0.9%. 1000 milliLiter(s) IV Continuous <Continuous>      cadexomer iodine 0.9% Gel 1 Application(s) Topical <User Schedule>  chlorhexidine 0.12% Liquid 15 milliLiter(s) Oral Mucosa every 12 hours  chlorhexidine 2% Cloths 1 Application(s) Topical two times a day  povidone iodine 10% Solution 1 Application(s) Topical daily        Mode: AC/ CMV (Assist Control/ Continuous Mandatory Ventilation)  RR (machine): 18  TV (machine): 400  FiO2: 60  PEEP: 5  ITime: 1  MAP: 18  PIP: 30      ICU Vital Signs Last 24 Hrs  T(C): 36.7 (21 Oct 2022 16:00), Max: 37.8 (21 Oct 2022 08:05)  T(F): 98 (21 Oct 2022 16:00), Max: 100 (21 Oct 2022 08:05)  HR: 87 (21 Oct 2022 18:00) (68 - 88)  BP: 126/77 (21 Oct 2022 18:00) (88/55 - 141/77)  BP(mean): 91 (21 Oct 2022 18:00) (66 - 97)  ABP: --  ABP(mean): --  RR: 26 (21 Oct 2022 18:00) (16 - 36)  SpO2: 100% (21 Oct 2022 18:00) (91% - 100%)    O2 Parameters below as of 21 Oct 2022 18:00  Patient On (Oxygen Delivery Method): ventilator                I&O's Detail    20 Oct 2022 07:01  -  21 Oct 2022 07:00  --------------------------------------------------------  IN:    IV PiggyBack: 250 mL    sodium chloride 0.9%: 1920 mL  Total IN: 2170 mL    OUT:    Dexmedetomidine: 0 mL    Indwelling Catheter - Urethral (mL): 1300 mL  Total OUT: 1300 mL    Total NET: 870 mL      21 Oct 2022 07:01  -  21 Oct 2022 19:19  --------------------------------------------------------  IN:    IV PiggyBack: 50 mL    sodium chloride 0.9%: 480 mL  Total IN: 530 mL    OUT:    Indwelling Catheter - Urethral (mL): 400 mL  Total OUT: 400 mL    Total NET: 130 mL            LABS:                        7.4    10.36 )-----------( 242      ( 21 Oct 2022 06:30 )             24.9     10-21    145  |  112<H>  |  69<H>  ----------------------------<  153<H>  3.5   |  23  |  1.37<H>    Ca    8.1<L>      21 Oct 2022 06:30  Phos  3.4     10-20    TPro  6.2  /  Alb  1.4<L>  /  TBili  0.4  /  DBili  x   /  AST  68<H>  /  ALT  74<H>  /  AlkPhos  89  10-21          CAPILLARY BLOOD GLUCOSE      POCT Blood Glucose.: 174 mg/dL (21 Oct 2022 17:57)    PT/INR - ( 20 Oct 2022 06:21 )   PT: 14.8 sec;   INR: 1.28 ratio             CULTURES:  Culture Results:   Moderate Mixed gram negative rods including  Moderate Stenotrophomonas maltophilia (10-19 @ 00:58)  Culture Results:   >100,000 CFU/ml Escherichia coli (10-18 @ 12:27)  Culture Results:   No growth to date. (10-18 @ 12:00)  Culture Results:   No growth to date. (10-18 @ 12:00)  Rapid RVP Result: NotDetec (10-18 @ 11:30)      Physical Examination:    General: No acute distress. +Trach/PEG    HEENT: Atraumatic, Normocephalic, pupils equal, reactive to light.  Symmetric.    PULM: Diminished / coarse breath sounds B/L    NECK: Supple, +Trach, RT IJ CVC    CVS: Regular rate and rhythm, no murmurs, rubs, or gallops    ABD: Soft, nondistended, nontender, no rebound or guarding, +PEG     EXT: No edema, nontender    SKIN: Warm and well perfused, left gluteal ulcer    NEURO: Demented / non-verbal     DEVICES:     RADIOLOGY:   < from: CT Abdomen and Pelvis No Cont (10.20.22 @ 22:43) >  ACC: [id number] EXAM:  CT ABDOMEN AND PELVIS                          PROCEDURE DATE:  10/20/2022          INTERPRETATION:  CLINICAL INFORMATION: 76 years  Female with ro rp bleed.    COMPARISON: 8/20/2022    CONTRAST/COMPLICATIONS:  IV Contrast: NONE  Oral Contrast: NONE  Complications: None reported at time of study completion    PROCEDURE:  CT of the Abdomen and Pelvis was performed.  Sagittal and coronal reformats were performed.    FINDINGS:  LOWER CHEST: Moderate bilateral pleural effusionswith underlying passive   atelectasis, unchanged. Right central line tip in the superior vena cava.    LIVER: Within normal limits.  BILE DUCTS: Normal caliber.  GALLBLADDER: Within normal limits.  SPLEEN: Within normal limits.  PANCREAS: Within normal limits.  ADRENALS: Within normal limits.  KIDNEYS/URETERS: Within normal limits.    BLADDER: Collapsed around Woody catheter.  REPRODUCTIVE ORGANS: Unremarkable uterus.    BOWEL: No bowel obstruction. Appendix is normal.. Gastrostomy tube   balloonin the stomach. Mildly distended rectum with liquid stool  PERITONEUM: Trace ascites.  VESSELS: Within normal limits.  RETROPERITONEUM/LYMPH NODES: No lymphadenopathy. No retroperitoneal   hemorrhage.  ABDOMINAL WALL: Within normal limits.  BONES: Moderate chronic L2 compression fracture deformity. Stable   deformity of the left proximal femur.    IMPRESSION:    No retroperitoneal hemorrhage.    Stable moderate bilateral pleural effusions with underlying compressive   atelectasis.    Small ascites.    Mildly distended rectum with liquid stool.        A preliminary report was provided by Dr. José Magaña. I agree with   the interpretation.    --- End of Report ---    ALISON ANDERSON MD; Attending Radiologist  This document has been electronically signed. Oct 21 2022  9:57AM      Critical Care time: 35 mins assessing presenting problems of acute illness that poses high probability of life threatening deterioration or end organ damage/dysfunction.  Medical decision making including initiating plan of care, reviewing data, reviewing radiology, direct patient bedside evaluation and interpretation of vital signs, any necessary ventilator management, discussion with multidisciplinary team, discussing goals of care with patient/family, all non inclusive of procedures

## 2022-10-21 NOTE — PROGRESS NOTE ADULT - ASSESSMENT
Pt is a 76W w/ PMHx of DM2, COPD, HTN, dementia, hx of subarachnoid hemorrhage, and history of chronic trach brought in by ambulance for evaluation of low CO2 and cyanotic appearance.   Well-known and has had multiple admissions for Acute on chronic RF and PNA w/ MDROs    Acute VAP/PNA  Acute on chronic HF  CAUTI/UTI  - pt p/w cyanosis  - most recent admission in end of August, most recent cx w/ Pseudomonas and Stenotrophomonas  - labs notable for leukocytosis to 13  - CT chest Groundglass opacities and interlobular septal thickening suggestive of CHF. There are also some more confluent areas of density   which may represent atelectasis versus infiltrates.Bilateral pleural effusions have decreased from the prior exam  - s/p levofloxacin in the ED  - UCx E.coli   - BCx NGTD  - sputum cx w/ Stenotrophomonas maltophilia  Plan:   F/u pending cx and susceptibilities  C/w levofloxacin 750mg q48h  Adding ceftriaxone 1gm q24h for possible UTI/CAUTI  Will use above to guide additional therapy  Isolation per infection control policy given hx of CRE  Supportive care/vent management per [facility]    Infectious Diseases will continue to follow. Please call with any questions.   Andressa Brantley M.D.  [facility] Division of Infectious Diseases [phone]   Pt is a 76W w/ PMHx of DM2, COPD, HTN, dementia, hx of subarachnoid hemorrhage, and history of chronic trach brought in by ambulance for evaluation of low CO2 and cyanotic appearance.   Well-known and has had multiple admissions for Acute on chronic RF and PNA w/ MDROs    Acute VAP/PNA  Acute on chronic HF  CAUTI/UTI  - pt p/w cyanosis  - most recent admission in end of August, most recent cx w/ Pseudomonas and Stenotrophomonas  - labs notable for leukocytosis to 13  - CT chest Groundglass opacities and interlobular septal thickening suggestive of CHF. There are also some more confluent areas of density   which may represent atelectasis versus infiltrates.Bilateral pleural effusions have decreased from the prior exam  - s/p levofloxacin in the ED  - UCx E.coli   - BCx NGTD  - sputum cx w/ Stenotrophomonas maltophilia  Plan:   F/u pending cx and susceptibilities  C/w levofloxacin 750mg q48h  Adding ceftriaxone 1gm q24h for possible UTI/CAUTI  Will use above to guide additional therapy  Isolation per infection control policy given hx of CRE  Supportive care/vent management per [facility]    Dr. Chairez covering weekend service    Infectious Diseases will continue to follow. Please call with any questions.   Andressa Brantely M.D.  [facility] Division of Infectious Diseases [phone]

## 2022-10-21 NOTE — PROGRESS NOTE ADULT - ASSESSMENT
The patient is a 76 year old female with a history of HTN, DM, CVA, dementia, chronic respiratory failure s/p trach, PEG, cardiac arrest, anemia, pleural effusions who presents with hypoxia.     Plan:  - ECG with no evidence of ischemia or infarction  - Echo 8/22 with normal LV systolic function, mod pulm HTN, IVC small/collapsible  - CT chest with small bilateral pleural effusions, GGO, and infiltrates  - Pleural effusions previously were exudative, likely parapneumonic  - Hold furosemide  - Procalcitonin significantly elevated  - IV antibiotics  - Monitor hemoglobin

## 2022-10-21 NOTE — PROGRESS NOTE ADULT - SUBJECTIVE AND OBJECTIVE BOX
BREA BECKHAM     [facility] 01    Allergies    codeine (Hives)    Intolerances        PAST MEDICAL & SURGICAL HISTORY:  Dementia of frontal lobe type      Aphasic stroke      Diabetes mellitus      Respiratory failure      Hypertension      GERD (gastroesophageal reflux disease)      Constipation      Respiratory failure      CVA (cerebral vascular accident)      HTN (hypertension)      DM (diabetes mellitus)      Advanced dementia      COVID-19 virus detected      Quadriplegia      Pneumonia      Type II diabetes mellitus      Hx of appendectomy      Gastrostomy in place      Tracheostomy in place      Tracheostomy tube present      Feeding by G-tube          FAMILY HISTORY:      Home Medications:  albuterol 90 mcg/inh inhalation aerosol with adapter: 2  inhaled every 6 hours (18 Oct 2022 11:42)  Bacid (LAC) oral tablet: 2 tab(s) by gastrostomy tube once a day (18 Oct 2022 11:42)  Betadine 10% topical swab: cleanse right and left great toes (18 Oct 2022 11:42)  chlorhexidine 0.12% mucous membrane liquid: 15 milliliter(s) mucous membrane 2 times a day (18 Oct 2022 11:42)  Eucerin topical cream: Apply topically to affected area once a day bilateral feet (18 Oct 2022 11:42)  insulin glargine 100 units/mL subcutaneous solution: 8 unit(s) subcutaneous once a day (in the morning) (18 Oct 2022 11:42)  ipratropium-albuterol 0.5 mg-2.5 mg/3 mL inhalation solution: 3 milliliter(s) inhaled 4 times a day (18 Oct 2022 11:42)  LORazepam 1 mg oral tablet: 1 tab(s) by gastrostomy tube every 4 hours (18 Oct 2022 11:42)  methocarbamol 500 mg oral tablet: 1 tab(s) by gastrostomy tube 2 times a day (18 Oct 2022 11:42)  MiraLax oral powder for reconstitution: g-tube (18 Oct 2022 13:04)  Multiple Vitamins oral tablet: 1 tab(s) orally once a day (18 Oct 2022 11:42)  nystatin 100,000 units/g topical powder: 1 application topically 3 times a day (18 Oct 2022 11:42)  omeprazole 20 mg oral delayed release capsule: orally 2 times a day (18 Oct 2022 11:42)  polyethylene glycol 3350 oral powder for reconstitution: 17 gram(s) by gastrostomy tube every 12 hours (18 Oct 2022 11:42)  senna 8.6 mg oral tablet: 3 tab(s) by gastrostomy tube once a day (at bedtime) (18 Oct 2022 11:42)  simethicone 80 mg oral tablet: g-tube (18 Oct 2022 13:04)  simethicone 80 mg oral tablet, chewable: 1 tab(s) by gastrostomy tube every 6 hours (18 Oct 2022 11:42)  sucralfate 1 g/10 mL oral suspension: 10 milliliter(s) g-tube 4 times a day (before meals and at bedtime) (18 Oct 2022 13:04)  Tylenol 325 mg oral tablet: 2 tab(s) by gastrostomy tube once a day; 60 minutes prior to dressing change  (18 Oct 2022 11:42)  Tylenol 325 mg oral tablet: 2 tab(s) by gastrostomy tube every 6 hours, As Needed (18 Oct 2022 11:42)      MEDICATIONS  (STANDING):  ALBUTerol    90 MICROgram(s) HFA Inhaler 2 Puff(s) Inhalation every 6 hours  cadexomer iodine 0.9% Gel 1 Application(s) Topical <User Schedule>  cefTRIAXone   IVPB      chlorhexidine 0.12% Liquid 15 milliLiter(s) Oral Mucosa every 12 hours  chlorhexidine 2% Cloths 1 Application(s) Topical two times a day  dexMEDEtomidine Infusion 0.5 MICROgram(s)/kG/Hr (6.14 mL/Hr) IV Continuous <Continuous>  dextrose 5%. 1000 milliLiter(s) (100 mL/Hr) IV Continuous <Continuous>  dextrose 5%. 1000 milliLiter(s) (50 mL/Hr) IV Continuous <Continuous>  dextrose 50% Injectable 25 Gram(s) IV Push once  dextrose 50% Injectable 12.5 Gram(s) IV Push once  dextrose 50% Injectable 25 Gram(s) IV Push once  glucagon  Injectable 1 milliGRAM(s) IntraMuscular once  heparin   Injectable 5000 Unit(s) SubCutaneous every 12 hours  insulin lispro (ADMELOG) corrective regimen sliding scale   SubCutaneous every 6 hours  ipratropium 17 MICROgram(s) HFA Inhaler 1 Puff(s) Inhalation every 6 hours  levoFLOXacin IVPB 750 milliGRAM(s) IV Intermittent every 48 hours  LORazepam     Tablet 1 milliGRAM(s) Oral every 6 hours  midodrine 10 milliGRAM(s) Oral every 8 hours  pantoprazole    Tablet 40 milliGRAM(s) Oral before breakfast  povidone iodine 10% Solution 1 Application(s) Topical daily  simethicone 80 milliGRAM(s) Chew every 12 hours  sodium chloride 0.9%. 1000 milliLiter(s) (80 mL/Hr) IV Continuous <Continuous>    MEDICATIONS  (PRN):  acetaminophen     Tablet .. 650 milliGRAM(s) Oral every 6 hours PRN Temp greater or equal to 38C (100.4F), Mild Pain (1 - 3)  aluminum hydroxide/magnesium hydroxide/simethicone Suspension 30 milliLiter(s) Oral every 4 hours PRN Dyspepsia  dextrose Oral Gel 15 Gram(s) Oral once PRN Blood Glucose LESS THAN 70 milliGRAM(s)/deciliter  LORazepam   Injectable 1 milliGRAM(s) IV Push every 4 hours PRN Agitation  melatonin 3 milliGRAM(s) Oral at bedtime PRN Insomnia  ondansetron Injectable 4 milliGRAM(s) IV Push every 8 hours PRN Nausea and/or Vomiting      Diet, NPO:   Except Medications (10-20-22 @ 21:10) [Active]  Diet, NPO with Tube Feed:   Tube Feeding Modality: Gastrostomy  Nepro with Carb Steady  Total Volume for 24 Hours (mL): 800  Continuous  Starting Tube Feed Rate mL per Hour: 20  Increase Tube Feed Rate by (mL): 10     Every 4 hours  Until Goal Tube Feed Rate (mL per Hour): 40  Tube Feed Duration (in Hours): 20  Tube Feed Start Time: 16:00  Tube Feed Stop Time: 12:00  Free Water Flush  Bolus   Total Volume per Flush (mL): 250   Frequency: Every 6 Hours  Lucas(7 Gm Arginine/7 Gm Glut/1.2 Gm HMB     Qty per Day:  1 (10-19-22 @ 13:57) [Pending Verification By Attending]          Vital Signs Last 24 Hrs  T(C): 36.7 (21 Oct 2022 16:00), Max: 37.8 (21 Oct 2022 08:05)  T(F): 98 (21 Oct 2022 16:00), Max: 100 (21 Oct 2022 08:05)  HR: 75 (21 Oct 2022 20:00) (68 - 88)  BP: 108/63 (21 Oct 2022 20:00) (88/55 - 141/77)  BP(mean): 77 (21 Oct 2022 20:00) (66 - 97)  RR: 24 (21 Oct 2022 20:00) (16 - 36)  SpO2: 97% (21 Oct 2022 20:00) (91% - 100%)    Parameters below as of 21 Oct 2022 20:00  Patient On (Oxygen Delivery Method): ventilator    O2 Concentration (%): 60      10-20-22 @ 07:01  -  10-21-22 @ 07:00  --------------------------------------------------------  IN: 2170 mL / OUT: 1300 mL / NET: 870 mL    10-21-22 @ 07:01  -  10-21-22 @ 20:28  --------------------------------------------------------  IN: 690 mL / OUT: 400 mL / NET: 290 mL        Mode: AC/ CMV (Assist Control/ Continuous Mandatory Ventilation), RR (machine): 18, TV (machine): 400, FiO2: 60, PEEP: 5, ITime: 1, MAP: 18, PIP: 30      LABS:                        7.4    10.36 )-----------( 242      ( 21 Oct 2022 06:30 )             24.9     10-21    145  |  112<H>  |  69<H>  ----------------------------<  153<H>  3.5   |  23  |  1.37<H>    Ca    8.1<L>      21 Oct 2022 06:30  Phos  3.4     10-20    TPro  6.2  /  Alb  1.4<L>  /  TBili  0.4  /  DBili  x   /  AST  68<H>  /  ALT  74<H>  /  AlkPhos  89  10-21    PT/INR - ( 20 Oct 2022 06:21 )   PT: 14.8 sec;   INR: 1.28 ratio                   WBC:  WBC Count: 10.36 K/uL (10-21 @ 06:30)  WBC Count: 15.38 K/uL (10-20 @ 06:21)  WBC Count: 11.58 K/uL (10-19 @ 20:43)  WBC Count: 8.81 K/uL (10-19 @ 06:32)  WBC Count: 13.01 K/uL (10-18 @ 11:30)      MICROBIOLOGY:  RECENT CULTURES:  10-19 .Sputum Sputum trap XXXX   Few Squamous epithelial cells per low power field  Few polymorphonuclear leukocytes per low power field  Few Gram positive cocci in pairs per oil power field   Moderate Mixed gram negative rods including  Moderate Stenotrophomonas maltophilia    10-18 Clean Catch Clean Catch (Midstream) Escherichia coli XXXX   >100,000 CFU/ml Escherichia coli    10-18 .Blood Blood-Peripheral XXXX XXXX   No growth to date.                PT/INR - ( 20 Oct 2022 06:21 )   PT: 14.8 sec;   INR: 1.28 ratio             Sodium:  Sodium, Serum: 145 mmol/L (10-21 @ 06:30)  Sodium, Serum: 147 mmol/L (10-20 @ 06:21)  Sodium, Serum: 141 mmol/L (10-19 @ 06:32)  Sodium, Serum: 133 mmol/L (10-18 @ 11:30)      1.37 mg/dL 10-21 @ 06:30  1.57 mg/dL 10-20 @ 06:21  2.24 mg/dL 10-19 @ 06:32  2.42 mg/dL 10-18 @ 11:30      Hemoglobin:  Hemoglobin: 7.4 g/dL (10-21 @ 06:30)  Hemoglobin: 8.2 g/dL (10-20 @ 06:21)  Hemoglobin: 7.5 g/dL (10-19 @ 20:43)  Hemoglobin: 6.3 g/dL (10-19 @ 06:32)  Hemoglobin: 7.4 g/dL (10-18 @ 11:30)      Platelets: Platelet Count - Automated: 242 K/uL (10-21 @ 06:30)  Platelet Count - Automated: 226 K/uL (10-20 @ 06:21)  Platelet Count - Automated: 204 K/uL (10-19 @ 20:43)  Platelet Count - Automated: 115 K/uL (10-19 @ 06:32)  Platelet Count - Automated: 168 K/uL (10-18 @ 11:30)      LIVER FUNCTIONS - ( 21 Oct 2022 06:30 )  Alb: 1.4 g/dL / Pro: 6.2 g/dL / ALK PHOS: 89 U/L / ALT: 74 U/L DA / AST: 68 U/L / GGT: x                 RADIOLOGY & ADDITIONAL STUDIES:      MICROBIOLOGY:  RECENT CULTURES:  10-19 .Sputum Sputum trap XXXX   Few Squamous epithelial cells per low power field  Few polymorphonuclear leukocytes per low power field  Few Gram positive cocci in pairs per oil power field   Moderate Mixed gram negative rods including  Moderate Stenotrophomonas maltophilia    10-18 Clean Catch Clean Catch (Midstream) Escherichia coli XXXX   >100,000 CFU/ml Escherichia coli    10-18 .Blood Blood-Peripheral XXXX XXXX   No growth to date.

## 2022-10-21 NOTE — PROGRESS NOTE ADULT - SUBJECTIVE AND OBJECTIVE BOX
Date/Time Patient Seen:  		  Referring MD:   Data Reviewed	       Patient is a 76y old  Female who presents with a chief complaint of Acute on Hypoxemic respiratory failure (20 Oct 2022 13:29)      Subjective/HPI     PAST MEDICAL & SURGICAL HISTORY:  Dementia of frontal lobe type    Aphasic stroke    Diabetes mellitus    Respiratory failure    Hypertension    GERD (gastroesophageal reflux disease)    Constipation    Respiratory failure    CVA (cerebral vascular accident)    HTN (hypertension)    DM (diabetes mellitus)    Advanced dementia    COVID-19 virus detected    Quadriplegia    Pneumonia    Type II diabetes mellitus    Hx of appendectomy    Gastrostomy in place    Tracheostomy in place    Tracheostomy tube present    Feeding by G-tube          Medication list         MEDICATIONS  (STANDING):  ALBUTerol    90 MICROgram(s) HFA Inhaler 2 Puff(s) Inhalation every 6 hours  cadexomer iodine 0.9% Gel 1 Application(s) Topical <User Schedule>  chlorhexidine 0.12% Liquid 15 milliLiter(s) Oral Mucosa every 12 hours  chlorhexidine 2% Cloths 1 Application(s) Topical two times a day  dexMEDEtomidine Infusion 0.5 MICROgram(s)/kG/Hr (6.14 mL/Hr) IV Continuous <Continuous>  dextrose 5%. 1000 milliLiter(s) (50 mL/Hr) IV Continuous <Continuous>  dextrose 5%. 1000 milliLiter(s) (100 mL/Hr) IV Continuous <Continuous>  dextrose 50% Injectable 25 Gram(s) IV Push once  dextrose 50% Injectable 12.5 Gram(s) IV Push once  dextrose 50% Injectable 25 Gram(s) IV Push once  glucagon  Injectable 1 milliGRAM(s) IntraMuscular once  heparin   Injectable 5000 Unit(s) SubCutaneous every 12 hours  insulin lispro (ADMELOG) corrective regimen sliding scale   SubCutaneous every 6 hours  ipratropium 17 MICROgram(s) HFA Inhaler 1 Puff(s) Inhalation every 6 hours  levoFLOXacin IVPB 750 milliGRAM(s) IV Intermittent every 48 hours  LORazepam     Tablet 1 milliGRAM(s) Oral every 6 hours  midodrine 10 milliGRAM(s) Oral every 8 hours  pantoprazole    Tablet 40 milliGRAM(s) Oral before breakfast  povidone iodine 10% Solution 1 Application(s) Topical daily  simethicone 80 milliGRAM(s) Chew every 12 hours  sodium chloride 0.9%. 1000 milliLiter(s) (80 mL/Hr) IV Continuous <Continuous>    MEDICATIONS  (PRN):  acetaminophen     Tablet .. 650 milliGRAM(s) Oral every 6 hours PRN Temp greater or equal to 38C (100.4F), Mild Pain (1 - 3)  aluminum hydroxide/magnesium hydroxide/simethicone Suspension 30 milliLiter(s) Oral every 4 hours PRN Dyspepsia  dextrose Oral Gel 15 Gram(s) Oral once PRN Blood Glucose LESS THAN 70 milliGRAM(s)/deciliter  LORazepam   Injectable 1 milliGRAM(s) IV Push every 4 hours PRN Agitation  melatonin 3 milliGRAM(s) Oral at bedtime PRN Insomnia  ondansetron Injectable 4 milliGRAM(s) IV Push every 8 hours PRN Nausea and/or Vomiting         Vitals log        ICU Vital Signs Last 24 Hrs  T(C): 36.1 (21 Oct 2022 04:23), Max: 37.3 (20 Oct 2022 12:46)  T(F): 97 (21 Oct 2022 04:23), Max: 99.1 (20 Oct 2022 12:46)  HR: 68 (21 Oct 2022 05:51) (68 - 124)  BP: 96/60 (21 Oct 2022 05:00) (88/55 - 143/65)  BP(mean): 72 (21 Oct 2022 05:00) (66 - 92)  ABP: --  ABP(mean): --  RR: 16 (21 Oct 2022 05:00) (16 - 34)  SpO2: 100% (21 Oct 2022 05:51) (94% - 100%)    O2 Parameters below as of 21 Oct 2022 05:00  Patient On (Oxygen Delivery Method): ventilator  O2 Flow (L/min): 70           Mode: AC/ CMV (Assist Control/ Continuous Mandatory Ventilation)  RR (machine): 18  TV (machine): 400  FiO2: 60  PEEP: 5  ITime: 1  MAP: 13  PIP: 29      Input and Output:  I&O's Detail    20 Oct 2022 07:01  -  21 Oct 2022 07:00  --------------------------------------------------------  IN:    IV PiggyBack: 250 mL    sodium chloride 0.9%: 1840 mL  Total IN: 2090 mL    OUT:    Dexmedetomidine: 0 mL    Indwelling Catheter - Urethral (mL): 1300 mL  Total OUT: 1300 mL    Total NET: 790 mL          Lab Data                        7.4    10.36 )-----------( 242      ( 21 Oct 2022 06:30 )             24.9     10-20    147<H>  |  109<H>  |  87<H>  ----------------------------<  236<H>  4.0   |  22  |  1.57<H>    Ca    8.3<L>      20 Oct 2022 06:21  Phos  3.4     10-20    TPro  6.6  /  Alb  1.6<L>  /  TBili  0.5  /  DBili  x   /  AST  44<H>  /  ALT  64<H>  /  AlkPhos  104  10-20            Review of Systems	      Objective     Physical Examination  trach  heart s1s2  lung dc BS        Pertinent Lab findings & Imaging      Annalise:  NO   Adequate UO     I&O's Detail    20 Oct 2022 07:01  -  21 Oct 2022 07:00  --------------------------------------------------------  IN:    IV PiggyBack: 250 mL    sodium chloride 0.9%: 1840 mL  Total IN: 2090 mL    OUT:    Dexmedetomidine: 0 mL    Indwelling Catheter - Urethral (mL): 1300 mL  Total OUT: 1300 mL    Total NET: 790 mL               Discussed with:     Cultures:	        Radiology

## 2022-10-21 NOTE — PROGRESS NOTE ADULT - SUBJECTIVE AND OBJECTIVE BOX
Chief Complaint: Hypoxia    Interval Events: No events overnight.    Review of Systems:  Unable to obtain    Physical Exam:  Vital Signs Last 24 Hrs  T(C): 36.1 (21 Oct 2022 04:23), Max: 37.3 (20 Oct 2022 12:46)  T(F): 97 (21 Oct 2022 04:23), Max: 99.1 (20 Oct 2022 12:46)  HR: 72 (21 Oct 2022 07:11) (68 - 98)  BP: 105/61 (21 Oct 2022 06:00) (88/55 - 134/70)  BP(mean): 76 (21 Oct 2022 06:00) (66 - 88)  RR: 24 (21 Oct 2022 06:00) (16 - 34)  SpO2: 99% (21 Oct 2022 07:11) (94% - 100%)  Parameters below as of 21 Oct 2022 07:11  Patient On (Oxygen Delivery Method): ventilator,.60  General: Unresponsive  HEENT: Trach  Neck: No JVD, no carotid bruit  Lungs: CTAB  CV: RRR, nl S1/S2, no M/R/G  Abdomen: S/NT/ND, +BS  Extremities: No LE edema, no cyanosis  Neuro: AAOx0  Skin: No rash    Labs:    10-21    145  |  112<H>  |  69<H>  ----------------------------<  153<H>  3.5   |  23  |  1.37<H>    Ca    8.1<L>      21 Oct 2022 06:30  Phos  3.4     10-20    TPro  6.2  /  Alb  1.4<L>  /  TBili  0.4  /  DBili  x   /  AST  68<H>  /  ALT  74<H>  /  AlkPhos  89  10-21                        7.4    10.36 )-----------( 242      ( 21 Oct 2022 06:30 )             24.9       Telemetry: Sinus rhythm

## 2022-10-21 NOTE — PROGRESS NOTE ADULT - ASSESSMENT
77yo F with PMH of dementia, COPD with chronic resp failure and is vent dependent, s/p PEG, hx of cardiac arrest, diastolic CHF, cor pulmonale, hx of CVA, COVID-19 infxn, CKD, anemia, multiple admissions for respiratory distress (recent admission from 6/1-6/9 diagnosed with PNA with parapneumonic pleural effusion s/p thoracentesis and Avycaz) who presents from [facility] for respiratory distress again a/w sepsis and respiratory failure due to suspected recurrent gram-negative PNA.    Severe sepsis and acute hypoxic respiratory failure 2/2 gram-negative PNA   - continue current vent settings (on AC with RR 16, , PEEP 5, FiO2 100%) maintain O2 sat >92%  - was on ertapenem,  zosyn+bactrim , now on levaquin  - lactate downtrending, procalcitonin high but improving with treatment (4.76 -> 3.34 -> 1.77)  - cautious use of fluids given hx of CHF (received 1750cc of NS in ED)  - f/u blood cultures (NGTD), urine culture pending   - f/u trach sputum culture - has GNR growing awaiting speciation / sensitivities  - has hx of respiratory distress driven by vent asynchrony and would require IV benzos ; trialed IV fentanyl with adequate effect this evening  - Checked CT Abd/P to eval for any other potential source sign of infection and found no significant sign of intra-abdominal infection on CT  - cc/pulm consult (Jaime), recs appreciated  - palliative care (Emre), recs appreciated - pt is full code  C/w levofloxacin 750mg q48h based on most recent cx per ID recs    Diastolic CHF  Hx of Pleural effusions  - last TTE was 5/30 with EF 65% with normal LVSF, dilated   RV, and moderate pHTN -> repeat TTE appears unchanged  - may need repeat thoracentesis (had 1.5L drained three admissions ago), pulmonology consulted; was parapneumonic on that admission as opposed to transudative and thus less likely related to CHF    hyperkalemia RYAN likely due to sepsis  will give bolus of fluids: careful use going forward.  repeat Cr and K    Anemia of chronic disease  - has been evaluated by GI and hematology in the past -> dx with ACD with intermittent GI bleeding  - s/p 1 unit yesterday, another unit ordered today 2/2 today's Hb being 6.3    Hyperkalemia  - given both insulin and lasix   still remains elevated  nutrition consulted    Hx of CKD stage 3 - near baseline of 1.2-1.3  - renally dose medications and monitor BMP and electrolytes   - Cr is 1.3 with eGFR of 41, but given pt has low muscle mass from being bedbound for years, this GFR estimate is likely falsely high  - consider nephro eval   nutrition consulted    HTN  - not on any BP meds at facility  - monitor VS    DM2  - NPO with TF  - continue with insulin 8units qAM as per last admission  - c/w moderate insulin coverage scale  - goal -180    Diet  - continue with same TF from last admission    Preventive measures  - cont with heparin subq for DVT ppx  - Full Code

## 2022-10-21 NOTE — PROGRESS NOTE ADULT - ASSESSMENT
REVIEW OF SYMPTOMS      Able to give (reliable) ROS  NO     PHYSICAL EXAM    HEENT Unremarkable  atraumatic   RESP Fair air entry EXP prolonged    Harsh breath sound Resp distres mild   CARDIAC S1 S2 No S3     NO JVD    ABDOMEN SOFT BS PRESENT NOT DISTENDED No hepatosplenomegaly   PEDAL EDEMA present No calf tenderness  NO rash       AGE/SEX.   76 f  DOA.  10/18/2022  CC .  10/18/2022 resp distress  PRESENTING PROBLEMS .   RESP DISTRESS 10/18/2022  VAP 10/18/2022   ANEMIA 10/18/2022   HYPONATREMIA 10/18/2022   HYPERKALEMIA 10/18/2022   RYAN 10/18/2022   COURSE.     PROCEDURES.  10/19/2022 rij ccpa   PAST ID ISSUES   8/19/2022  zosyn Se Vignesh  8/19/2022 bactrim ds 2 bid   pmh 5/2/2022 SERRATIA CARBAPENEM RESISTANT PSEUDOMONAS   PMH .  pmh Trach  pmh peg  pmh NO idf  pmh Pneumonia    pmh HTN,   pmh DM,   pmh CVA,   pmh  chronic respiratory failure   pmh s/p trach, PEG,   pmh cardiac arrest  pmh pulm hytn echo 8/19/2022 n lvsf dd1 pasp 58    pmh Pl effsn  r PL EFFSN   6/8/2022 r thorac g 161 l 222 p 4.9 p 10 l 16 .38    l pl effsn  5/25/2022 g 183 l 144/381 .37 p 3.4/5.3 .64 p 23 l 36   5/25/2022l pl fluid  lymph pred exudate   cyto (-)             GENERAL DATA .   GOC.  10/18/2022 full code       ALLGY. codeine                            WT.          10/18/2022 48  BMI.          10/18/2022 17                     ICU STAY. 10/18/2022  COVID.  10/18/2022 scv2 (-)     BEST PRACTICE ISSUES.    HOB ELEVATN. Yes  DVT PPLX.   10/18/2022 hpsc    SQUIRES PPLX.  10/18/2022 protonix 40     INFN PPLX.    10/18/2022 ch;lorhexidine .12%  10/19/2022 chlorhexidine 2%    SP SW EM.        DIET.  10/20 npo    VS/ PO/IO/ VENT/ DRIPS.  10/21/2022 75 100/60   10/21/2022 ac 18/400/5/.6     ASSESSMENT/RECOMMENDATIONS .   RESP.  -- Gas exchange.   10/18/2022 ac 18/400/100/5 751/37/257  -- VENT MANAGEMENT.   HOB elevation  Target Pplat 30 (-)  Target PO 90-95%  Target pH 730 (+)  Daily spontaneous breathing trials   Daily sedation vacation   -- Pleuralk effsn  Past ritchie   R THORAC   6/8/2022  g 161 l 222 p 4.9 p 10 l 16 .38    L THORAC   5/25/2022 g 183 l 144/381 .37 p 3.4/5.3 .64 p 23 l 36   5/25/2022l pl fluid  lymph pred exudate   cyto (-)     Ct ch 10/18/2022 sm r mod l effs large subpulmonic component   a/r No thoracentesis plannd at this point risk prohibitive in vent pt   -- Mediastinal lne.  Ct ch 10/18/2022 again enlarged mediastinal lns likely reactive   a/r will need followup with ct in 2m and consider biopsy if persists   -- wheeze.  10/18 albuterol hf  10/18 atrovent hf   INFECTION.  PAST ID ISSUES   8/19/2022  zosyn Se Vignesh  8/19/2022 bactrim ds 2 bid   pmh 5/2/2022 SERRATIA CARBAPENEM RESISTANT PSEUDOMONAS  -- VAP 10/18/2022.  -- UTI 10/18/2022   w 10/18-10/19-10/20-10/21/2022 w 13 - 8.8 - 15-10.3   pr 10/20/2022 6.6    ua 10/18/2022 w 11-25   ct ch 10/18/2022 cw 8/18/20232 ggo post upper lobes with interlobular septal thickening infiltrate or atelectasis lower lobes l more than   sm r and sm to mod l effsn  rvp 10/18/2022 (-)   uc 10/18 100K E coli   sp 10/19 Stenotrophomonas   bc 10/18 (-)   10/18/2022 meropenem Dr NEWTON  dc  10/18/2022 ID Dr Brantley on case   10/19/2022 levaquin 750   10/21/2022 rocephin x 7d Dr BRITTANY Brantley      CARDIAC.  -- Hypotension.  10/18/2022 88/48   echo 8/19/2022 n lvsf dd1 pasp 58   10/20 midocrine 10.3   Target MAP 65 (+)   resolvd   GI.  -- ELEVATED LFTS.  LFTS 10/18-10/19-10/20/2022   ap 140-131- 104  ast - 44  alt 21 - 103 - 64   monitor  HEMAT.  -- Anemia.  Hb 10/18-10/19-10/20-10/21/2022 Hb 7.4 -  6.3 - 8.2  - 7.4   inr 10/18/2022 inr 1.23   ctap 10/20 No rp bl  Monitor  target Hb 7 (+)   -- TRANSFUSION  10/19/2022 2 U PRBC   RENAL.  -- Hyponatremia.  Na 10/18-10/19-10/20-10/21/2022 Na 133 - 141 - 147 - 145   monitor correct    -- RYAN.  Cr 10/18-10/19-10/20-10/21/2022 Cr 2.42-2.2 - 1.5 - 1.3   Cr 8/26/2022 Cr 1.4   Fluid renal us monitor  IV fl.  -- 10/18/2022 ns 70     TIME SPENT   Over 39 minutes aggregate critical care time spent on encounter; activities included   direct patient care, counseling and/or coordinating care reviewing notes, lab data/ imaging , discussion with multidisciplinary team/ patient  /family and explaining in detail risks, benefits, alternatives  of the recommendations     CHAPINCITO GUIDRY 76 f [facility] S 10/18/2022   DR ANG PADGETT

## 2022-10-22 LAB
-  LEVOFLOXACIN: SIGNIFICANT CHANGE UP
-  MINOCYCLINE: SIGNIFICANT CHANGE UP
-  TRIMETHOPRIM/SULFAMETHOXAZOLE: SIGNIFICANT CHANGE UP
ALBUMIN SERPL ELPH-MCNC: 1.7 G/DL — LOW (ref 3.3–5)
ALP SERPL-CCNC: 89 U/L — SIGNIFICANT CHANGE UP (ref 30–120)
ALT FLD-CCNC: 81 U/L DA — HIGH (ref 10–60)
ANION GAP SERPL CALC-SCNC: 10 MMOL/L — SIGNIFICANT CHANGE UP (ref 5–17)
AST SERPL-CCNC: 53 U/L — HIGH (ref 10–40)
BILIRUB SERPL-MCNC: 0.4 MG/DL — SIGNIFICANT CHANGE UP (ref 0.2–1.2)
BUN SERPL-MCNC: 57 MG/DL — HIGH (ref 7–23)
CALCIUM SERPL-MCNC: 7.7 MG/DL — LOW (ref 8.4–10.5)
CHLORIDE SERPL-SCNC: 115 MMOL/L — HIGH (ref 96–108)
CO2 SERPL-SCNC: 23 MMOL/L — SIGNIFICANT CHANGE UP (ref 22–31)
CREAT SERPL-MCNC: 1.18 MG/DL — SIGNIFICANT CHANGE UP (ref 0.5–1.3)
CULTURE RESULTS: SIGNIFICANT CHANGE UP
EGFR: 47 ML/MIN/1.73M2 — LOW
GLUCOSE BLDC GLUCOMTR-MCNC: 126 MG/DL — HIGH (ref 70–99)
GLUCOSE BLDC GLUCOMTR-MCNC: 144 MG/DL — HIGH (ref 70–99)
GLUCOSE BLDC GLUCOMTR-MCNC: 174 MG/DL — HIGH (ref 70–99)
GLUCOSE BLDC GLUCOMTR-MCNC: 246 MG/DL — HIGH (ref 70–99)
GLUCOSE SERPL-MCNC: 129 MG/DL — HIGH (ref 70–99)
HCT VFR BLD CALC: 27.1 % — LOW (ref 34.5–45)
HGB BLD-MCNC: 8 G/DL — LOW (ref 11.5–15.5)
MAGNESIUM SERPL-MCNC: 2.9 MG/DL — HIGH (ref 1.6–2.6)
MCHC RBC-ENTMCNC: 26.9 PG — LOW (ref 27–34)
MCHC RBC-ENTMCNC: 29.5 GM/DL — LOW (ref 32–36)
MCV RBC AUTO: 91.2 FL — SIGNIFICANT CHANGE UP (ref 80–100)
METHOD TYPE: SIGNIFICANT CHANGE UP
METHOD TYPE: SIGNIFICANT CHANGE UP
NRBC # BLD: 1 /100 WBCS — HIGH (ref 0–0)
ORGANISM # SPEC MICROSCOPIC CNT: SIGNIFICANT CHANGE UP
PHOSPHATE SERPL-MCNC: 3.9 MG/DL — SIGNIFICANT CHANGE UP (ref 2.5–4.5)
PLATELET # BLD AUTO: 235 K/UL — SIGNIFICANT CHANGE UP (ref 150–400)
POTASSIUM SERPL-MCNC: 3.9 MMOL/L — SIGNIFICANT CHANGE UP (ref 3.5–5.3)
POTASSIUM SERPL-SCNC: 3.9 MMOL/L — SIGNIFICANT CHANGE UP (ref 3.5–5.3)
PROT SERPL-MCNC: 5.7 G/DL — LOW (ref 6–8.3)
RBC # BLD: 2.97 M/UL — LOW (ref 3.8–5.2)
RBC # FLD: 17.4 % — HIGH (ref 10.3–14.5)
SODIUM SERPL-SCNC: 148 MMOL/L — HIGH (ref 135–145)
SPECIMEN SOURCE: SIGNIFICANT CHANGE UP
WBC # BLD: 9.4 K/UL — SIGNIFICANT CHANGE UP (ref 3.8–10.5)
WBC # FLD AUTO: 9.4 K/UL — SIGNIFICANT CHANGE UP (ref 3.8–10.5)

## 2022-10-22 PROCEDURE — 99233 SBSQ HOSP IP/OBS HIGH 50: CPT

## 2022-10-22 RX ADMIN — Medication 1 MILLIGRAM(S): at 23:16

## 2022-10-22 RX ADMIN — Medication 4: at 23:16

## 2022-10-22 RX ADMIN — SIMETHICONE 80 MILLIGRAM(S): 80 TABLET, CHEWABLE ORAL at 17:17

## 2022-10-22 RX ADMIN — MIDODRINE HYDROCHLORIDE 10 MILLIGRAM(S): 2.5 TABLET ORAL at 05:21

## 2022-10-22 RX ADMIN — Medication 1 PUFF(S): at 00:38

## 2022-10-22 RX ADMIN — HEPARIN SODIUM 5000 UNIT(S): 5000 INJECTION INTRAVENOUS; SUBCUTANEOUS at 17:17

## 2022-10-22 RX ADMIN — CHLORHEXIDINE GLUCONATE 15 MILLILITER(S): 213 SOLUTION TOPICAL at 17:16

## 2022-10-22 RX ADMIN — PANTOPRAZOLE SODIUM 40 MILLIGRAM(S): 20 TABLET, DELAYED RELEASE ORAL at 06:04

## 2022-10-22 RX ADMIN — Medication 1 MILLIGRAM(S): at 05:21

## 2022-10-22 RX ADMIN — ALBUTEROL 2 PUFF(S): 90 AEROSOL, METERED ORAL at 00:38

## 2022-10-22 RX ADMIN — Medication 1 PUFF(S): at 14:36

## 2022-10-22 RX ADMIN — ALBUTEROL 2 PUFF(S): 90 AEROSOL, METERED ORAL at 18:43

## 2022-10-22 RX ADMIN — MIDODRINE HYDROCHLORIDE 10 MILLIGRAM(S): 2.5 TABLET ORAL at 13:32

## 2022-10-22 RX ADMIN — Medication 1 MILLIGRAM(S): at 17:17

## 2022-10-22 RX ADMIN — Medication 1 APPLICATION(S): at 11:24

## 2022-10-22 RX ADMIN — Medication 1 MILLIGRAM(S): at 11:25

## 2022-10-22 RX ADMIN — CEFTRIAXONE 100 MILLIGRAM(S): 500 INJECTION, POWDER, FOR SOLUTION INTRAMUSCULAR; INTRAVENOUS at 12:39

## 2022-10-22 RX ADMIN — CHLORHEXIDINE GLUCONATE 1 APPLICATION(S): 213 SOLUTION TOPICAL at 06:04

## 2022-10-22 RX ADMIN — CHLORHEXIDINE GLUCONATE 15 MILLILITER(S): 213 SOLUTION TOPICAL at 05:22

## 2022-10-22 RX ADMIN — HEPARIN SODIUM 5000 UNIT(S): 5000 INJECTION INTRAVENOUS; SUBCUTANEOUS at 05:21

## 2022-10-22 RX ADMIN — Medication 1 MILLIGRAM(S): at 06:37

## 2022-10-22 RX ADMIN — SIMETHICONE 80 MILLIGRAM(S): 80 TABLET, CHEWABLE ORAL at 05:21

## 2022-10-22 RX ADMIN — ALBUTEROL 2 PUFF(S): 90 AEROSOL, METERED ORAL at 14:36

## 2022-10-22 RX ADMIN — CHLORHEXIDINE GLUCONATE 1 APPLICATION(S): 213 SOLUTION TOPICAL at 17:36

## 2022-10-22 RX ADMIN — Medication 1 APPLICATION(S): at 11:23

## 2022-10-22 RX ADMIN — Medication 2: at 17:37

## 2022-10-22 RX ADMIN — Medication 1 PUFF(S): at 18:43

## 2022-10-22 RX ADMIN — MIDODRINE HYDROCHLORIDE 10 MILLIGRAM(S): 2.5 TABLET ORAL at 21:08

## 2022-10-22 RX ADMIN — SODIUM CHLORIDE 80 MILLILITER(S): 9 INJECTION INTRAMUSCULAR; INTRAVENOUS; SUBCUTANEOUS at 05:22

## 2022-10-22 NOTE — PROGRESS NOTE ADULT - ASSESSMENT
The patient is a 76 year old female with a history of HTN, DM, CVA, dementia, chronic respiratory failure s/p trach, PEG, cardiac arrest, anemia, pleural effusions who presents with hypoxia.     Plan:  - ECG with no evidence of ischemia or infarction  - Echo 8/22 with normal LV systolic function, mod pulm HTN, IVC small/collapsible  - CT chest with small bilateral pleural effusions, GGO, and infiltrates  - Pleural effusions previously were exudative, likely parapneumonic  - Hold furosemide  - Continue midodrine 10 mg tid  - Procalcitonin significantly elevated  - IV antibiotics  - Monitor hemoglobin

## 2022-10-22 NOTE — PROGRESS NOTE ADULT - ASSESSMENT
REVIEW OF SYMPTOMS      Able to give (reliable) ROS  NO     PHYSICAL EXAM    HEENT Unremarkable  atraumatic   RESP Fair air entry EXP prolonged    Harsh breath sound Resp distres mild   CARDIAC S1 S2 No S3     NO JVD    ABDOMEN SOFT BS PRESENT NOT DISTENDED No hepatosplenomegaly   PEDAL EDEMA present No calf tenderness  NO rash       AGE/SEX.   76 f  DOA.  10/18/2022  CC .  10/18/2022 resp distress  PRESENTING PROBLEMS .   RESP DISTRESS 10/18/2022  VAP 10/18/2022   ANEMIA 10/18/2022   HYPONATREMIA 10/18/2022   HYPERKALEMIA 10/18/2022   RYAN 10/18/2022   COURSE.     PROCEDURES.  10/19/2022 ri ccpa     GENERAL DATA .   GOC.  10/18/2022 full code       ALLGY. codeine                            WT.          10/18/2022 48  BMI.          10/18/2022 17                     ICU STAY. 10/18/2022  COVID.  10/18/2022 scv2 (-)     BEST PRACTICE ISSUES.    HOB ELEVATN. Yes  DVT PPLX.   10/18/2022 hpsc    SQUIRES PPLX.  10/18/2022 protonix 40     INFN PPLX.    10/18/2022 ch;lorhexidine .12%  10/19/2022 chlorhexidine 2%    SP SW EM.        DIET.  10/20 npo    VS/ PO/IO/ VENT/ DRIPS.  10/22/2022 80 120/60   10/22/2022 ac 16/400/5/.7     ASSESSMENT/RECOMMENDATIONS .   RESP.  -- Gas exchange.   10/18/2022 ac 18/400/100/5 751/37/257  -- VENT MANAGEMENT.   HOB elevation  Target Pplat 30 (-)  Target PO 90-95%  Target pH 730 (+)  Daily spontaneous breathing trials   Daily sedation vacation   -- Pleuralk effsn  Past ritchie   R THORAC   6/8/2022  g 161 l 222 p 4.9 p 10 l 16 .38    L THORAC   5/25/2022 g 183 l 144/381 .37 p 3.4/5.3 .64 p 23 l 36   5/25/2022l pl fluid  lymph pred exudate   cyto (-)     Ct ch 10/18/2022 sm r mod l effs large subpulmonic component   a/r No thoracentesis plannd at this point risk prohibitive in vent pt   -- Mediastinal lne.  Ct ch 10/18/2022 again enlarged mediastinal lns likely reactive   a/r will need followup with ct in 2m and consider biopsy if persists   -- wheeze.  10/18 albuterol hf  10/18 atrovent hf   INFECTION.  PAST ID ISSUES   8/19/2022  zosyn Se Vignesh  8/19/2022 bactrim ds 2 bid   pmh 5/2/2022 SERRATIA CARBAPENEM RESISTANT PSEUDOMONAS  -- VAP 10/18/2022.  -- UTI 10/18/2022   w 10/18-10/19-10/20-10/21-10/23/2022 w 13 - 8.8 - 15-10.3 - 9.4  pr 10/20/2022 6.6    ua 10/18/2022 w 11-25   ct ch 10/18/2022 cw 8/18/20232 ggo post upper lobes with interlobular septal thickening infiltrate or atelectasis lower lobes l more than   sm r and sm to mod l effsn  rvp 10/18/2022 (-)   uc 10/18 100K E coli   sp 10/19 Stenotrophomonas   bc 10/18 (-)   10/18/2022 meropenem Dr NEWTON  dc  10/18/2022 ID Dr Brantley on case   10/19/2022 levaquin 750   10/21/2022 rocephin x 7d Dr BRITTANY Brantley      CARDIAC.  -- Hypotension.  10/18/2022 88/48   echo 8/19/2022 n lvsf dd1 pasp 58   10/20 midocrine 10.3   Target MAP 65 (+)   resolvd   GI.  -- ELEVATED LFTS.  LFTS 10/18-10/19-10/20/2022   ap 140-131- 104  ast - 44  alt 21 - 103 - 64   monitor  HEMAT.  -- Anemia.  Hb 10/18-10/19-10/20-10/21-10/22/2022 Hb 7.4 -  6.3 - 8.2  - 7.4 - 8   inr 10/18/2022 inr 1.23   ctap 10/20 No rp bl  Monitor  target Hb 7 (+)   -- TRANSFUSION  10/19/2022 2 U PRBC   RENAL.  -- Hyponatremia.  Na 10/18-10/19-10/20-10/21-10/22/2022 Na 133 - 141 - 147 - 145 - 148   monitor correct    -- RYAN.  Cr 10/18-10/19-10/20-10/21-10/22/2022 Cr 2.42-2.2 - 1.5 - 1.3 - 1.1  Cr 8/26/2022 Cr 1.4   Fluid renal us monitor    TIME SPENT   Over 39 minutes aggregate critical care time spent on encounter; activities included   direct patient care, counseling and/or coordinating care reviewing notes, lab data/ imaging , discussion with multidisciplinary team/ patient  /family and explaining in detail risks, benefits, alternatives  of the recommendations     CHAPINCITO GUIDRY 76 f [facility] S 10/18/2022   DR ANG PADGETT

## 2022-10-22 NOTE — PROGRESS NOTE ADULT - SUBJECTIVE AND OBJECTIVE BOX
BREA BECKHAM     [facility]    Allergies    codeine (Hives)    Intolerances        PAST MEDICAL & SURGICAL HISTORY:  Dementia of frontal lobe type      Aphasic stroke      Diabetes mellitus      Respiratory failure      Hypertension      GERD (gastroesophageal reflux disease)      Constipation      Respiratory failure      CVA (cerebral vascular accident)      HTN (hypertension)      DM (diabetes mellitus)      Advanced dementia      COVID-19 virus detected      Quadriplegia      Pneumonia      Type II diabetes mellitus      Hx of appendectomy      Gastrostomy in place      Tracheostomy in place      Tracheostomy tube present      Feeding by G-tube          FAMILY HISTORY:      Home Medications:  albuterol 90 mcg/inh inhalation aerosol with adapter: 2  inhaled every 6 hours (18 Oct 2022 11:42)  Bacid (LAC) oral tablet: 2 tab(s) by gastrostomy tube once a day (18 Oct 2022 11:42)  Betadine 10% topical swab: cleanse right and left great toes (18 Oct 2022 11:42)  chlorhexidine 0.12% mucous membrane liquid: 15 milliliter(s) mucous membrane 2 times a day (18 Oct 2022 11:42)  Eucerin topical cream: Apply topically to affected area once a day bilateral feet (18 Oct 2022 11:42)  insulin glargine 100 units/mL subcutaneous solution: 8 unit(s) subcutaneous once a day (in the morning) (18 Oct 2022 11:42)  ipratropium-albuterol 0.5 mg-2.5 mg/3 mL inhalation solution: 3 milliliter(s) inhaled 4 times a day (18 Oct 2022 11:42)  LORazepam 1 mg oral tablet: 1 tab(s) by gastrostomy tube every 4 hours (18 Oct 2022 11:42)  methocarbamol 500 mg oral tablet: 1 tab(s) by gastrostomy tube 2 times a day (18 Oct 2022 11:42)  MiraLax oral powder for reconstitution: g-tube (18 Oct 2022 13:04)  Multiple Vitamins oral tablet: 1 tab(s) orally once a day (18 Oct 2022 11:42)  nystatin 100,000 units/g topical powder: 1 application topically 3 times a day (18 Oct 2022 11:42)  omeprazole 20 mg oral delayed release capsule: orally 2 times a day (18 Oct 2022 11:42)  polyethylene glycol 3350 oral powder for reconstitution: 17 gram(s) by gastrostomy tube every 12 hours (18 Oct 2022 11:42)  senna 8.6 mg oral tablet: 3 tab(s) by gastrostomy tube once a day (at bedtime) (18 Oct 2022 11:42)  simethicone 80 mg oral tablet: g-tube (18 Oct 2022 13:04)  simethicone 80 mg oral tablet, chewable: 1 tab(s) by gastrostomy tube every 6 hours (18 Oct 2022 11:42)  sucralfate 1 g/10 mL oral suspension: 10 milliliter(s) g-tube 4 times a day (before meals and at bedtime) (18 Oct 2022 13:04)  Tylenol 325 mg oral tablet: 2 tab(s) by gastrostomy tube once a day; 60 minutes prior to dressing change  (18 Oct 2022 11:42)  Tylenol 325 mg oral tablet: 2 tab(s) by gastrostomy tube every 6 hours, As Needed (18 Oct 2022 11:42)      MEDICATIONS  (STANDING):  ALBUTerol    90 MICROgram(s) HFA Inhaler 2 Puff(s) Inhalation every 6 hours  cadexomer iodine 0.9% Gel 1 Application(s) Topical <User Schedule>  cefTRIAXone   IVPB 1000 milliGRAM(s) IV Intermittent every 24 hours  cefTRIAXone   IVPB      chlorhexidine 0.12% Liquid 15 milliLiter(s) Oral Mucosa every 12 hours  chlorhexidine 2% Cloths 1 Application(s) Topical two times a day  dexMEDEtomidine Infusion 0.5 MICROgram(s)/kG/Hr (6.14 mL/Hr) IV Continuous <Continuous>  dextrose 5%. 1000 milliLiter(s) (50 mL/Hr) IV Continuous <Continuous>  dextrose 5%. 1000 milliLiter(s) (100 mL/Hr) IV Continuous <Continuous>  dextrose 50% Injectable 25 Gram(s) IV Push once  dextrose 50% Injectable 12.5 Gram(s) IV Push once  dextrose 50% Injectable 25 Gram(s) IV Push once  glucagon  Injectable 1 milliGRAM(s) IntraMuscular once  heparin   Injectable 5000 Unit(s) SubCutaneous every 12 hours  insulin lispro (ADMELOG) corrective regimen sliding scale   SubCutaneous every 6 hours  ipratropium 17 MICROgram(s) HFA Inhaler 1 Puff(s) Inhalation every 6 hours  levoFLOXacin IVPB 750 milliGRAM(s) IV Intermittent every 48 hours  LORazepam     Tablet 1 milliGRAM(s) Oral every 6 hours  midodrine 10 milliGRAM(s) Oral every 8 hours  pantoprazole    Tablet 40 milliGRAM(s) Oral before breakfast  povidone iodine 10% Solution 1 Application(s) Topical daily  simethicone 80 milliGRAM(s) Chew every 12 hours    MEDICATIONS  (PRN):  acetaminophen     Tablet .. 650 milliGRAM(s) Oral every 6 hours PRN Temp greater or equal to 38C (100.4F), Mild Pain (1 - 3)  aluminum hydroxide/magnesium hydroxide/simethicone Suspension 30 milliLiter(s) Oral every 4 hours PRN Dyspepsia  dextrose Oral Gel 15 Gram(s) Oral once PRN Blood Glucose LESS THAN 70 milliGRAM(s)/deciliter  LORazepam   Injectable 1 milliGRAM(s) IV Push every 4 hours PRN Agitation  melatonin 3 milliGRAM(s) Oral at bedtime PRN Insomnia  ondansetron Injectable 4 milliGRAM(s) IV Push every 8 hours PRN Nausea and/or Vomiting      Diet, NPO with Tube Feed:   Tube Feeding Modality: Gastrostomy  Nepro with Carb Steady  Total Volume for 24 Hours (mL): 800  Continuous  Starting Tube Feed Rate mL per Hour: 20  Increase Tube Feed Rate by (mL): 10     Every 4 hours  Until Goal Tube Feed Rate (mL per Hour): 40  Tube Feed Duration (in Hours): 20  Tube Feed Start Time: 16:00  Tube Feed Stop Time: 12:00  Free Water Flush  Bolus   Total Volume per Flush (mL): 250   Frequency: Every 6 Hours  Lucas(7 Gm Arginine/7 Gm Glut/1.2 Gm HMB     Qty per Day:  1 (10-19-22 @ 13:57) [Active]          Vital Signs Last 24 Hrs  T(C): 36.1 (22 Oct 2022 16:04), Max: 36.6 (22 Oct 2022 05:09)  T(F): 97 (22 Oct 2022 16:04), Max: 97.8 (22 Oct 2022 05:09)  HR: 73 (22 Oct 2022 15:00) (63 - 87)  BP: 120/75 (22 Oct 2022 15:00) (99/63 - 133/72)  BP(mean): 90 (22 Oct 2022 15:00) (74 - 100)  RR: 22 (22 Oct 2022 15:00) (17 - 35)  SpO2: 100% (22 Oct 2022 15:00) (96% - 100%)    Parameters below as of 22 Oct 2022 15:00  Patient On (Oxygen Delivery Method): ventilator          10-21-22 @ 07:01  -  10-22-22 @ 07:00  --------------------------------------------------------  IN: 1570 mL / OUT: 901 mL / NET: 669 mL        Mode: AC/ CMV (Assist Control/ Continuous Mandatory Ventilation), RR (machine): 18, TV (machine): 400, FiO2: 70, PEEP: 5, ITime: 1, MAP: 11, PIP: 22      LABS:                        8.0    9.40  )-----------( 235      ( 22 Oct 2022 06:07 )             27.1     10-22    148<H>  |  115<H>  |  57<H>  ----------------------------<  129<H>  3.9   |  23  |  1.18    Ca    7.7<L>      22 Oct 2022 06:07  Phos  3.9     10-22  Mg     2.9     10-22    TPro  5.7<L>  /  Alb  1.7<L>  /  TBili  0.4  /  DBili  x   /  AST  53<H>  /  ALT  81<H>  /  AlkPhos  89  10-22              WBC:  WBC Count: 9.40 K/uL (10-22 @ 06:07)  WBC Count: 10.36 K/uL (10-21 @ 06:30)  WBC Count: 15.38 K/uL (10-20 @ 06:21)  WBC Count: 11.58 K/uL (10-19 @ 20:43)  WBC Count: 8.81 K/uL (10-19 @ 06:32)      MICROBIOLOGY:  RECENT CULTURES:  10-19 .Sputum Sputum trap XXXX   Few Squamous epithelial cells per low power field  Few polymorphonuclear leukocytes per low power field  Few Gram positive cocci in pairs per oil power field   Moderate Mixed gram negative rods including  Moderate Stenotrophomonas maltophilia    10-18 Clean Catch Clean Catch (Midstream) Escherichia coli XXXX   >100,000 CFU/ml Escherichia coli    10-18 .Blood Blood-Peripheral XXXX XXXX   No growth to date.                    Sodium:  Sodium, Serum: 148 mmol/L (10-22 @ 06:07)  Sodium, Serum: 145 mmol/L (10-21 @ 06:30)  Sodium, Serum: 147 mmol/L (10-20 @ 06:21)  Sodium, Serum: 141 mmol/L (10-19 @ 06:32)      1.18 mg/dL 10-22 @ 06:07  1.37 mg/dL 10-21 @ 06:30  1.57 mg/dL 10-20 @ 06:21  2.24 mg/dL 10-19 @ 06:32      Hemoglobin:  Hemoglobin: 8.0 g/dL (10-22 @ 06:07)  Hemoglobin: 7.4 g/dL (10-21 @ 06:30)  Hemoglobin: 8.2 g/dL (10-20 @ 06:21)  Hemoglobin: 7.5 g/dL (10-19 @ 20:43)  Hemoglobin: 6.3 g/dL (10-19 @ 06:32)      Platelets: Platelet Count - Automated: 235 K/uL (10-22 @ 06:07)  Platelet Count - Automated: 242 K/uL (10-21 @ 06:30)  Platelet Count - Automated: 226 K/uL (10-20 @ 06:21)  Platelet Count - Automated: 204 K/uL (10-19 @ 20:43)  Platelet Count - Automated: 115 K/uL (10-19 @ 06:32)      LIVER FUNCTIONS - ( 22 Oct 2022 06:07 )  Alb: 1.7 g/dL / Pro: 5.7 g/dL / ALK PHOS: 89 U/L / ALT: 81 U/L DA / AST: 53 U/L / GGT: x                 RADIOLOGY & ADDITIONAL STUDIES:      MICROBIOLOGY:  RECENT CULTURES:  10-19 .Sputum Sputum trap XXXX   Few Squamous epithelial cells per low power field  Few polymorphonuclear leukocytes per low power field  Few Gram positive cocci in pairs per oil power field   Moderate Mixed gram negative rods including  Moderate Stenotrophomonas maltophilia    10-18 Clean Catch Clean Catch (Midstream) Escherichia coli XXXX   >100,000 CFU/ml Escherichia coli    10-18 .Blood Blood-Peripheral XXXX XXXX   No growth to date.

## 2022-10-22 NOTE — PROGRESS NOTE ADULT - SUBJECTIVE AND OBJECTIVE BOX
Patient is a 76y old  Female who presents with a chief complaint of Acute on Hypoxemic respiratory failure (22 Oct 2022 16:20)      BRIEF HOSPITAL COURSE: 76F with dementia, COPD with chronic resp failure and is vent dependent, s/p Trach/PEG, hx of cardiac arrest, CHF, cor pulmonale, CVA, COVID-19 infxn, CKD, anemia, multiple admissions for respiratory distress (last admission from 6/1-6/9  and now august diagnosed with PNA with parapneumonic pleural effusion s/p course of Ertapenem who presents from [facility] for respiratory distress . Patient not verbal, demented unable to obtain any hx. Patient was admitted to the [facility] for acute on chronic hypoxic respiratory failure 2/2 to pna, sepsis, chronic anemia, RYAN, hyperK, hyperglycemia and mild hypoNa. Transfused PRBC x 2. Patient's Hgb 7.4<--8.2, CT ABD/Pel significant for moderate B/L pleural effusions with atelectasis, but did not reveal retroperitoneal hemorrhage. No additional events reported throughout the day.      Events last 24 hours: Repeat Hgb up-trending 8.0<--7.4, CTN downtrending, BG well controlled <180, and magnesium noted to be slightly elevated.       ROS unable to perform 2/2 patient's current state      PAST MEDICAL & SURGICAL HISTORY:  Dementia of frontal lobe type      Aphasic stroke      Diabetes mellitus      Respiratory failure      Hypertension      GERD (gastroesophageal reflux disease)      Constipation      Respiratory failure      CVA (cerebral vascular accident)      HTN (hypertension)      DM (diabetes mellitus)      Advanced dementia      COVID-19 virus detected      Quadriplegia      Pneumonia      Type II diabetes mellitus      Hx of appendectomy      Gastrostomy in place      Tracheostomy in place      Tracheostomy tube present      Feeding by G-tube            Medications:  cefTRIAXone   IVPB 1000 milliGRAM(s) IV Intermittent every 24 hours  cefTRIAXone   IVPB      levoFLOXacin IVPB 750 milliGRAM(s) IV Intermittent every 48 hours    midodrine 10 milliGRAM(s) Oral every 8 hours    ALBUTerol    90 MICROgram(s) HFA Inhaler 2 Puff(s) Inhalation every 6 hours  ipratropium 17 MICROgram(s) HFA Inhaler 1 Puff(s) Inhalation every 6 hours    acetaminophen     Tablet .. 650 milliGRAM(s) Oral every 6 hours PRN  dexMEDEtomidine Infusion 0.5 MICROgram(s)/kG/Hr IV Continuous <Continuous>  LORazepam     Tablet 1 milliGRAM(s) Oral every 6 hours  LORazepam   Injectable 1 milliGRAM(s) IV Push every 4 hours PRN  melatonin 3 milliGRAM(s) Oral at bedtime PRN  ondansetron Injectable 4 milliGRAM(s) IV Push every 8 hours PRN      heparin   Injectable 5000 Unit(s) SubCutaneous every 12 hours    aluminum hydroxide/magnesium hydroxide/simethicone Suspension 30 milliLiter(s) Oral every 4 hours PRN  pantoprazole    Tablet 40 milliGRAM(s) Oral before breakfast  simethicone 80 milliGRAM(s) Chew every 12 hours      dextrose 50% Injectable 25 Gram(s) IV Push once  dextrose 50% Injectable 12.5 Gram(s) IV Push once  dextrose 50% Injectable 25 Gram(s) IV Push once  dextrose Oral Gel 15 Gram(s) Oral once PRN  glucagon  Injectable 1 milliGRAM(s) IntraMuscular once  insulin lispro (ADMELOG) corrective regimen sliding scale   SubCutaneous every 6 hours    dextrose 5%. 1000 milliLiter(s) IV Continuous <Continuous>  dextrose 5%. 1000 milliLiter(s) IV Continuous <Continuous>      cadexomer iodine 0.9% Gel 1 Application(s) Topical <User Schedule>  chlorhexidine 0.12% Liquid 15 milliLiter(s) Oral Mucosa every 12 hours  chlorhexidine 2% Cloths 1 Application(s) Topical two times a day  povidone iodine 10% Solution 1 Application(s) Topical daily        Mode: AC/ CMV (Assist Control/ Continuous Mandatory Ventilation)  RR (machine): 18  TV (machine): 400  FiO2: 70  PEEP: 5  ITime: 1  MAP: 15  PIP: 32      ICU Vital Signs Last 24 Hrs  T(C): 36.7 (22 Oct 2022 16:07), Max: 36.7 (22 Oct 2022 16:07)  T(F): 98 (22 Oct 2022 16:07), Max: 98 (22 Oct 2022 16:07)  HR: 82 (22 Oct 2022 19:00) (63 - 83)  BP: 126/66 (22 Oct 2022 19:00) (99/63 - 133/72)  BP(mean): 84 (22 Oct 2022 19:00) (74 - 100)  ABP: --  ABP(mean): --  RR: 32 (22 Oct 2022 19:00) (17 - 35)  SpO2: 99% (22 Oct 2022 19:00) (96% - 100%)    O2 Parameters below as of 22 Oct 2022 19:00  Patient On (Oxygen Delivery Method): ventilator    O2 Concentration (%): 70            I&O's Detail    21 Oct 2022 07:01  -  22 Oct 2022 07:00  --------------------------------------------------------  IN:    IV PiggyBack: 50 mL    sodium chloride 0.9%: 1520 mL  Total IN: 1570 mL    OUT:    Blood Loss (mL): 1 mL    Indwelling Catheter - Urethral (mL): 900 mL  Total OUT: 901 mL    Total NET: 669 mL      22 Oct 2022 07:01  -  22 Oct 2022 19:40  --------------------------------------------------------  IN:    Free Water: 500 mL    IV PiggyBack: 600 mL    Nepro with Carb Steady: 260 mL    sodium chloride 0.9%: 320 mL  Total IN: 1680 mL    OUT:    Indwelling Catheter - Urethral (mL): 350 mL  Total OUT: 350 mL    Total NET: 1330 mL            LABS:                        8.0    9.40  )-----------( 235      ( 22 Oct 2022 06:07 )             27.1     10-22    148<H>  |  115<H>  |  57<H>  ----------------------------<  129<H>  3.9   |  23  |  1.18    Ca    7.7<L>      22 Oct 2022 06:07  Phos  3.9     10-22  Mg     2.9     10-22    TPro  5.7<L>  /  Alb  1.7<L>  /  TBili  0.4  /  DBili  x   /  AST  53<H>  /  ALT  81<H>  /  AlkPhos  89  10-22          CAPILLARY BLOOD GLUCOSE      POCT Blood Glucose.: 174 mg/dL (22 Oct 2022 17:14)        CULTURES:  Culture Results:   Moderate Mixed gram negative rods including  Moderate Stenotrophomonas maltophilia (10-19 @ 00:58)  Culture Results:   >100,000 CFU/ml Escherichia coli (10-18 @ 12:27)  Culture Results:   No growth to date. (10-18 @ 12:00)  Culture Results:   No growth to date. (10-18 @ 12:00)  Rapid RVP Result: NotDetec (10-18 @ 11:30)      Physical Examination:    General: No acute distress.      HEENT: Pupils equal, reactive to light.  Symmetric.    PULM: Clear to auscultation bilaterally, no significant sputum production    NECK: Supple, no lymphadenopathy, trachea midline    CVS: Regular rate and rhythm, no murmurs, rubs, or gallops    ABD: Soft, nondistended, nontender, normoactive bowel sounds, no masses    EXT: No edema, nontender    SKIN: Warm and well perfused, no rashes noted.    NEURO: Alert, oriented, interactive, nonfocal    DEVICES:     RADIOLOGY: ***    CRITICAL CARE TIME SPENT: ***   Patient is a 76y old  Female who presents with a chief complaint of Acute on Hypoxemic respiratory failure (22 Oct 2022 16:20)      BRIEF HOSPITAL COURSE: 76F with dementia, COPD with chronic resp failure and is vent dependent, s/p Trach/PEG, hx of cardiac arrest, CHF, cor pulmonale, CVA, COVID-19 infxn, CKD, anemia, multiple admissions for respiratory distress (last admission from 6/1-6/9  and now august diagnosed with PNA with parapneumonic pleural effusion s/p course of Ertapenem who presents from [facility] for respiratory distress . Patient not verbal, demented unable to obtain any hx. Patient was admitted to the [facility] for acute on chronic hypoxic respiratory failure 2/2 to pna, sepsis, chronic anemia, RYAN, hyperK, hyperglycemia and mild hypoNa. Transfused PRBC x 2. Patient's Hgb 7.4<--8.2, CT ABD/Pel significant for moderate B/L pleural effusions with atelectasis, but did not reveal retroperitoneal hemorrhage. No additional events reported throughout the day.      Events last 24 hours: Repeat Hgb up-trending 8.0<--7.4, CTN downtrending, BG well controlled <180, and magnesium noted to be slightly elevated.       ROS unable to perform 2/2 patient's current state      PAST MEDICAL & SURGICAL HISTORY:  Dementia of frontal lobe type      Aphasic stroke      Diabetes mellitus      Respiratory failure      Hypertension      GERD (gastroesophageal reflux disease)      Constipation      Respiratory failure      CVA (cerebral vascular accident)      HTN (hypertension)      DM (diabetes mellitus)      Advanced dementia      COVID-19 virus detected      Quadriplegia      Pneumonia      Type II diabetes mellitus      Hx of appendectomy      Gastrostomy in place      Tracheostomy in place      Tracheostomy tube present      Feeding by G-tube            Medications:  cefTRIAXone   IVPB 1000 milliGRAM(s) IV Intermittent every 24 hours  cefTRIAXone   IVPB      levoFLOXacin IVPB 750 milliGRAM(s) IV Intermittent every 48 hours    midodrine 10 milliGRAM(s) Oral every 8 hours    ALBUTerol    90 MICROgram(s) HFA Inhaler 2 Puff(s) Inhalation every 6 hours  ipratropium 17 MICROgram(s) HFA Inhaler 1 Puff(s) Inhalation every 6 hours    acetaminophen     Tablet .. 650 milliGRAM(s) Oral every 6 hours PRN  dexMEDEtomidine Infusion 0.5 MICROgram(s)/kG/Hr IV Continuous <Continuous>  LORazepam     Tablet 1 milliGRAM(s) Oral every 6 hours  LORazepam   Injectable 1 milliGRAM(s) IV Push every 4 hours PRN  melatonin 3 milliGRAM(s) Oral at bedtime PRN  ondansetron Injectable 4 milliGRAM(s) IV Push every 8 hours PRN      heparin   Injectable 5000 Unit(s) SubCutaneous every 12 hours    aluminum hydroxide/magnesium hydroxide/simethicone Suspension 30 milliLiter(s) Oral every 4 hours PRN  pantoprazole    Tablet 40 milliGRAM(s) Oral before breakfast  simethicone 80 milliGRAM(s) Chew every 12 hours      dextrose 50% Injectable 25 Gram(s) IV Push once  dextrose 50% Injectable 12.5 Gram(s) IV Push once  dextrose 50% Injectable 25 Gram(s) IV Push once  dextrose Oral Gel 15 Gram(s) Oral once PRN  glucagon  Injectable 1 milliGRAM(s) IntraMuscular once  insulin lispro (ADMELOG) corrective regimen sliding scale   SubCutaneous every 6 hours    dextrose 5%. 1000 milliLiter(s) IV Continuous <Continuous>  dextrose 5%. 1000 milliLiter(s) IV Continuous <Continuous>      cadexomer iodine 0.9% Gel 1 Application(s) Topical <User Schedule>  chlorhexidine 0.12% Liquid 15 milliLiter(s) Oral Mucosa every 12 hours  chlorhexidine 2% Cloths 1 Application(s) Topical two times a day  povidone iodine 10% Solution 1 Application(s) Topical daily        Mode: AC/ CMV (Assist Control/ Continuous Mandatory Ventilation)  RR (machine): 18  TV (machine): 400  FiO2: 70  PEEP: 5  ITime: 1  MAP: 15  PIP: 32      ICU Vital Signs Last 24 Hrs  T(C): 36.7 (22 Oct 2022 16:07), Max: 36.7 (22 Oct 2022 16:07)  T(F): 98 (22 Oct 2022 16:07), Max: 98 (22 Oct 2022 16:07)  HR: 82 (22 Oct 2022 19:00) (63 - 83)  BP: 126/66 (22 Oct 2022 19:00) (99/63 - 133/72)  BP(mean): 84 (22 Oct 2022 19:00) (74 - 100)  ABP: --  ABP(mean): --  RR: 32 (22 Oct 2022 19:00) (17 - 35)  SpO2: 99% (22 Oct 2022 19:00) (96% - 100%)    O2 Parameters below as of 22 Oct 2022 19:00  Patient On (Oxygen Delivery Method): ventilator    O2 Concentration (%): 70            I&O's Detail    21 Oct 2022 07:01  -  22 Oct 2022 07:00  --------------------------------------------------------  IN:    IV PiggyBack: 50 mL    sodium chloride 0.9%: 1520 mL  Total IN: 1570 mL    OUT:    Blood Loss (mL): 1 mL    Indwelling Catheter - Urethral (mL): 900 mL  Total OUT: 901 mL    Total NET: 669 mL      22 Oct 2022 07:01  -  22 Oct 2022 19:40  --------------------------------------------------------  IN:    Free Water: 500 mL    IV PiggyBack: 600 mL    Nepro with Carb Steady: 260 mL    sodium chloride 0.9%: 320 mL  Total IN: 1680 mL    OUT:    Indwelling Catheter - Urethral (mL): 350 mL  Total OUT: 350 mL    Total NET: 1330 mL            LABS:                        8.0    9.40  )-----------( 235      ( 22 Oct 2022 06:07 )             27.1     10-22    148<H>  |  115<H>  |  57<H>  ----------------------------<  129<H>  3.9   |  23  |  1.18    Ca    7.7<L>      22 Oct 2022 06:07  Phos  3.9     10-22  Mg     2.9     10-22    TPro  5.7<L>  /  Alb  1.7<L>  /  TBili  0.4  /  DBili  x   /  AST  53<H>  /  ALT  81<H>  /  AlkPhos  89  10-22          CAPILLARY BLOOD GLUCOSE      POCT Blood Glucose.: 174 mg/dL (22 Oct 2022 17:14)        CULTURES:  Culture Results:   Moderate Mixed gram negative rods including  Moderate Stenotrophomonas maltophilia (10-19 @ 00:58)  Culture Results:   >100,000 CFU/ml Escherichia coli (10-18 @ 12:27)  Culture Results:   No growth to date. (10-18 @ 12:00)  Culture Results:   No growth to date. (10-18 @ 12:00)  Rapid RVP Result: NotDetec (10-18 @ 11:30)      Physical Examination:    General: No acute distress. +Trach/PEG    HEENT: Atraumatic, Normocephalic, pupils equal, reactive to light.  Symmetric.    PULM: Diminished / coarse breath sounds B/L    NECK: Supple, +Trach, RT IJ CVC    CVS: Regular rate and rhythm, no murmurs, rubs, or gallops    ABD: Soft, nondistended, nontender, no rebound or guarding, +PEG, site C/D/I     EXT: No edema, nontender    SKIN: Warm and well perfused, left gluteal ulcer    NEURO: Demented / non-verbal     DEVICES:     RADIOLOGY:   < from: CT Abdomen and Pelvis No Cont (10.20.22 @ 22:43) >  ACC: [id number] EXAM:  CT ABDOMEN AND PELVIS                          PROCEDURE DATE:  10/20/2022          INTERPRETATION:  CLINICAL INFORMATION: 76 years  Female with ro rp bleed.    COMPARISON: 8/20/2022    CONTRAST/COMPLICATIONS:  IV Contrast: NONE  Oral Contrast: NONE  Complications: None reported at time of study completion    PROCEDURE:  CT of the Abdomen and Pelvis was performed.  Sagittal and coronal reformats were performed.    FINDINGS:  LOWER CHEST: Moderate bilateral pleural effusionswith underlying passive   atelectasis, unchanged. Right central line tip in the superior vena cava.    LIVER: Within normal limits.  BILE DUCTS: Normal caliber.  GALLBLADDER: Within normal limits.  SPLEEN: Within normal limits.  PANCREAS: Within normal limits.  ADRENALS: Within normal limits.  KIDNEYS/URETERS: Within normal limits.    BLADDER: Collapsed around Woody catheter.  REPRODUCTIVE ORGANS: Unremarkable uterus.    BOWEL: No bowel obstruction. Appendix is normal.. Gastrostomy tube   balloonin the stomach. Mildly distended rectum with liquid stool  PERITONEUM: Trace ascites.  VESSELS: Within normal limits.  RETROPERITONEUM/LYMPH NODES: No lymphadenopathy. No retroperitoneal   hemorrhage.  ABDOMINAL WALL: Within normal limits.  BONES: Moderate chronic L2 compression fracture deformity. Stable   deformity of the left proximal femur.    IMPRESSION:    No retroperitoneal hemorrhage.    Stable moderate bilateral pleural effusions with underlying compressive   atelectasis.    Small ascites.    Mildly distended rectum with liquid stool.        A preliminary report was provided by Dr. José Magaña. I agree with   the interpretation.    --- End of Report ---    ALISON ANDERSON MD; Attending Radiologist  This document has been electronically signed. Oct 21 2022  9:57AM     Patient is a 76y old  Female who presents with a chief complaint of Acute on Hypoxemic respiratory failure (22 Oct 2022 16:20)      BRIEF HOSPITAL COURSE: 76F with dementia, COPD with chronic resp failure and is vent dependent, s/p Trach/PEG, hx of cardiac arrest, CHF, cor pulmonale, CVA, COVID-19 infxn, CKD, anemia, multiple admissions for respiratory distress (last admission from 6/1-6/9  and now august diagnosed with PNA with parapneumonic pleural effusion s/p course of Ertapenem who presents from [facility] for respiratory distress . Patient not verbal, demented unable to obtain any hx. Patient was admitted to the [facility] for acute on chronic hypoxic respiratory failure 2/2 to pna, sepsis, chronic anemia, RYAN, hyperK, hyperglycemia and mild hypoNa. Transfused PRBC x 2. Patient's Hgb 7.4<--8.2, CT ABD/Pel significant for moderate B/L pleural effusions with atelectasis, but did not reveal retroperitoneal hemorrhage. No additional events reported throughout the day.      Events last 24 hours: Repeat Hgb up-trending 8.0<--7.4, CTN downtrending, BG well controlled <180, and magnesium noted to be slightly elevated. Unable to wean FIO2 requirements on vent.      ROS unable to perform 2/2 patient's current state      PAST MEDICAL & SURGICAL HISTORY:  Dementia of frontal lobe type      Aphasic stroke      Diabetes mellitus      Respiratory failure      Hypertension      GERD (gastroesophageal reflux disease)      Constipation      Respiratory failure      CVA (cerebral vascular accident)      HTN (hypertension)      DM (diabetes mellitus)      Advanced dementia      COVID-19 virus detected      Quadriplegia      Pneumonia      Type II diabetes mellitus      Hx of appendectomy      Gastrostomy in place      Tracheostomy in place      Tracheostomy tube present      Feeding by G-tube            Medications:  cefTRIAXone   IVPB 1000 milliGRAM(s) IV Intermittent every 24 hours  cefTRIAXone   IVPB      levoFLOXacin IVPB 750 milliGRAM(s) IV Intermittent every 48 hours    midodrine 10 milliGRAM(s) Oral every 8 hours    ALBUTerol    90 MICROgram(s) HFA Inhaler 2 Puff(s) Inhalation every 6 hours  ipratropium 17 MICROgram(s) HFA Inhaler 1 Puff(s) Inhalation every 6 hours    acetaminophen     Tablet .. 650 milliGRAM(s) Oral every 6 hours PRN  dexMEDEtomidine Infusion 0.5 MICROgram(s)/kG/Hr IV Continuous <Continuous>  LORazepam     Tablet 1 milliGRAM(s) Oral every 6 hours  LORazepam   Injectable 1 milliGRAM(s) IV Push every 4 hours PRN  melatonin 3 milliGRAM(s) Oral at bedtime PRN  ondansetron Injectable 4 milliGRAM(s) IV Push every 8 hours PRN      heparin   Injectable 5000 Unit(s) SubCutaneous every 12 hours    aluminum hydroxide/magnesium hydroxide/simethicone Suspension 30 milliLiter(s) Oral every 4 hours PRN  pantoprazole    Tablet 40 milliGRAM(s) Oral before breakfast  simethicone 80 milliGRAM(s) Chew every 12 hours      dextrose 50% Injectable 25 Gram(s) IV Push once  dextrose 50% Injectable 12.5 Gram(s) IV Push once  dextrose 50% Injectable 25 Gram(s) IV Push once  dextrose Oral Gel 15 Gram(s) Oral once PRN  glucagon  Injectable 1 milliGRAM(s) IntraMuscular once  insulin lispro (ADMELOG) corrective regimen sliding scale   SubCutaneous every 6 hours    dextrose 5%. 1000 milliLiter(s) IV Continuous <Continuous>  dextrose 5%. 1000 milliLiter(s) IV Continuous <Continuous>      cadexomer iodine 0.9% Gel 1 Application(s) Topical <User Schedule>  chlorhexidine 0.12% Liquid 15 milliLiter(s) Oral Mucosa every 12 hours  chlorhexidine 2% Cloths 1 Application(s) Topical two times a day  povidone iodine 10% Solution 1 Application(s) Topical daily        Mode: AC/ CMV (Assist Control/ Continuous Mandatory Ventilation)  RR (machine): 18  TV (machine): 400  FiO2: 70  PEEP: 5  ITime: 1  MAP: 15  PIP: 32      ICU Vital Signs Last 24 Hrs  T(C): 36.7 (22 Oct 2022 16:07), Max: 36.7 (22 Oct 2022 16:07)  T(F): 98 (22 Oct 2022 16:07), Max: 98 (22 Oct 2022 16:07)  HR: 82 (22 Oct 2022 19:00) (63 - 83)  BP: 126/66 (22 Oct 2022 19:00) (99/63 - 133/72)  BP(mean): 84 (22 Oct 2022 19:00) (74 - 100)  ABP: --  ABP(mean): --  RR: 32 (22 Oct 2022 19:00) (17 - 35)  SpO2: 99% (22 Oct 2022 19:00) (96% - 100%)    O2 Parameters below as of 22 Oct 2022 19:00  Patient On (Oxygen Delivery Method): ventilator    O2 Concentration (%): 70            I&O's Detail    21 Oct 2022 07:01  -  22 Oct 2022 07:00  --------------------------------------------------------  IN:    IV PiggyBack: 50 mL    sodium chloride 0.9%: 1520 mL  Total IN: 1570 mL    OUT:    Blood Loss (mL): 1 mL    Indwelling Catheter - Urethral (mL): 900 mL  Total OUT: 901 mL    Total NET: 669 mL      22 Oct 2022 07:01  -  22 Oct 2022 19:40  --------------------------------------------------------  IN:    Free Water: 500 mL    IV PiggyBack: 600 mL    Nepro with Carb Steady: 260 mL    sodium chloride 0.9%: 320 mL  Total IN: 1680 mL    OUT:    Indwelling Catheter - Urethral (mL): 350 mL  Total OUT: 350 mL    Total NET: 1330 mL            LABS:                        8.0    9.40  )-----------( 235      ( 22 Oct 2022 06:07 )             27.1     10-22    148<H>  |  115<H>  |  57<H>  ----------------------------<  129<H>  3.9   |  23  |  1.18    Ca    7.7<L>      22 Oct 2022 06:07  Phos  3.9     10-22  Mg     2.9     10-22    TPro  5.7<L>  /  Alb  1.7<L>  /  TBili  0.4  /  DBili  x   /  AST  53<H>  /  ALT  81<H>  /  AlkPhos  89  10-22          CAPILLARY BLOOD GLUCOSE      POCT Blood Glucose.: 174 mg/dL (22 Oct 2022 17:14)        CULTURES:  Culture Results:   Moderate Mixed gram negative rods including  Moderate Stenotrophomonas maltophilia (10-19 @ 00:58)  Culture Results:   >100,000 CFU/ml Escherichia coli (10-18 @ 12:27)  Culture Results:   No growth to date. (10-18 @ 12:00)  Culture Results:   No growth to date. (10-18 @ 12:00)  Rapid RVP Result: NotDetec (10-18 @ 11:30)      Physical Examination:    General: No acute distress. +Trach/PEG    HEENT: Atraumatic, Normocephalic, pupils equal, reactive to light.  Symmetric.    PULM: Diminished / coarse breath sounds B/L    NECK: Supple, +Trach, RT IJ CVC    CVS: Regular rate and rhythm, no murmurs, rubs, or gallops    ABD: Soft, nondistended, nontender, no rebound or guarding, +PEG, site C/D/I     EXT: No edema, nontender    SKIN: Warm and well perfused, left gluteal ulcer    NEURO: Demented / non-verbal     DEVICES:     RADIOLOGY:   < from: CT Abdomen and Pelvis No Cont (10.20.22 @ 22:43) >  ACC: [id number] EXAM:  CT ABDOMEN AND PELVIS                          PROCEDURE DATE:  10/20/2022          INTERPRETATION:  CLINICAL INFORMATION: 76 years  Female with ro rp bleed.    COMPARISON: 8/20/2022    CONTRAST/COMPLICATIONS:  IV Contrast: NONE  Oral Contrast: NONE  Complications: None reported at time of study completion    PROCEDURE:  CT of the Abdomen and Pelvis was performed.  Sagittal and coronal reformats were performed.    FINDINGS:  LOWER CHEST: Moderate bilateral pleural effusionswith underlying passive   atelectasis, unchanged. Right central line tip in the superior vena cava.    LIVER: Within normal limits.  BILE DUCTS: Normal caliber.  GALLBLADDER: Within normal limits.  SPLEEN: Within normal limits.  PANCREAS: Within normal limits.  ADRENALS: Within normal limits.  KIDNEYS/URETERS: Within normal limits.    BLADDER: Collapsed around Woody catheter.  REPRODUCTIVE ORGANS: Unremarkable uterus.    BOWEL: No bowel obstruction. Appendix is normal.. Gastrostomy tube   balloonin the stomach. Mildly distended rectum with liquid stool  PERITONEUM: Trace ascites.  VESSELS: Within normal limits.  RETROPERITONEUM/LYMPH NODES: No lymphadenopathy. No retroperitoneal   hemorrhage.  ABDOMINAL WALL: Within normal limits.  BONES: Moderate chronic L2 compression fracture deformity. Stable   deformity of the left proximal femur.    IMPRESSION:    No retroperitoneal hemorrhage.    Stable moderate bilateral pleural effusions with underlying compressive   atelectasis.    Small ascites.    Mildly distended rectum with liquid stool.        A preliminary report was provided by Dr. José Magaña. I agree with   the interpretation.    --- End of Report ---    ALISON ANDERSON MD; Attending Radiologist  This document has been electronically signed. Oct 21 2022  9:57AM

## 2022-10-22 NOTE — PROGRESS NOTE ADULT - SUBJECTIVE AND OBJECTIVE BOX
Date/Time Patient Seen:  		  Referring MD:   Data Reviewed	       Patient is a 76y old  Female who presents with a chief complaint of Acute on Hypoxemic respiratory failure (21 Oct 2022 20:27)      Subjective/HPI     PAST MEDICAL & SURGICAL HISTORY:  Dementia of frontal lobe type    Aphasic stroke    Diabetes mellitus    Respiratory failure    Hypertension    GERD (gastroesophageal reflux disease)    Constipation    Respiratory failure    CVA (cerebral vascular accident)    HTN (hypertension)    DM (diabetes mellitus)    Advanced dementia    COVID-19 virus detected    Quadriplegia    Pneumonia    Type II diabetes mellitus    Hx of appendectomy    Gastrostomy in place    Tracheostomy in place    Tracheostomy tube present    Feeding by G-tube          Medication list         MEDICATIONS  (STANDING):  ALBUTerol    90 MICROgram(s) HFA Inhaler 2 Puff(s) Inhalation every 6 hours  cadexomer iodine 0.9% Gel 1 Application(s) Topical <User Schedule>  cefTRIAXone   IVPB 1000 milliGRAM(s) IV Intermittent every 24 hours  cefTRIAXone   IVPB      chlorhexidine 0.12% Liquid 15 milliLiter(s) Oral Mucosa every 12 hours  chlorhexidine 2% Cloths 1 Application(s) Topical two times a day  dexMEDEtomidine Infusion 0.5 MICROgram(s)/kG/Hr (6.14 mL/Hr) IV Continuous <Continuous>  dextrose 5%. 1000 milliLiter(s) (50 mL/Hr) IV Continuous <Continuous>  dextrose 5%. 1000 milliLiter(s) (100 mL/Hr) IV Continuous <Continuous>  dextrose 50% Injectable 25 Gram(s) IV Push once  dextrose 50% Injectable 12.5 Gram(s) IV Push once  dextrose 50% Injectable 25 Gram(s) IV Push once  glucagon  Injectable 1 milliGRAM(s) IntraMuscular once  heparin   Injectable 5000 Unit(s) SubCutaneous every 12 hours  insulin lispro (ADMELOG) corrective regimen sliding scale   SubCutaneous every 6 hours  ipratropium 17 MICROgram(s) HFA Inhaler 1 Puff(s) Inhalation every 6 hours  levoFLOXacin IVPB 750 milliGRAM(s) IV Intermittent every 48 hours  LORazepam     Tablet 1 milliGRAM(s) Oral every 6 hours  midodrine 10 milliGRAM(s) Oral every 8 hours  pantoprazole    Tablet 40 milliGRAM(s) Oral before breakfast  povidone iodine 10% Solution 1 Application(s) Topical daily  simethicone 80 milliGRAM(s) Chew every 12 hours  sodium chloride 0.9%. 1000 milliLiter(s) (80 mL/Hr) IV Continuous <Continuous>    MEDICATIONS  (PRN):  acetaminophen     Tablet .. 650 milliGRAM(s) Oral every 6 hours PRN Temp greater or equal to 38C (100.4F), Mild Pain (1 - 3)  aluminum hydroxide/magnesium hydroxide/simethicone Suspension 30 milliLiter(s) Oral every 4 hours PRN Dyspepsia  dextrose Oral Gel 15 Gram(s) Oral once PRN Blood Glucose LESS THAN 70 milliGRAM(s)/deciliter  LORazepam   Injectable 1 milliGRAM(s) IV Push every 4 hours PRN Agitation  melatonin 3 milliGRAM(s) Oral at bedtime PRN Insomnia  ondansetron Injectable 4 milliGRAM(s) IV Push every 8 hours PRN Nausea and/or Vomiting         Vitals log        ICU Vital Signs Last 24 Hrs  T(C): 36.6 (22 Oct 2022 05:09), Max: 36.7 (21 Oct 2022 16:00)  T(F): 97.8 (22 Oct 2022 05:09), Max: 98 (21 Oct 2022 16:00)  HR: 63 (22 Oct 2022 07:53) (63 - 87)  BP: 122/72 (22 Oct 2022 06:00) (99/63 - 141/77)  BP(mean): 87 (22 Oct 2022 06:00) (74 - 97)  ABP: --  ABP(mean): --  RR: 28 (22 Oct 2022 06:00) (18 - 36)  SpO2: 98% (22 Oct 2022 07:53) (91% - 100%)    O2 Parameters below as of 22 Oct 2022 07:53  Patient On (Oxygen Delivery Method): ventilator             Mode: AC/ CMV (Assist Control/ Continuous Mandatory Ventilation)  RR (machine): 18  TV (machine): 400  FiO2: 70  PEEP: 5  ITime: 1  MAP: 13  PIP: 25      Input and Output:  I&O's Detail    21 Oct 2022 07:01  -  22 Oct 2022 07:00  --------------------------------------------------------  IN:    IV PiggyBack: 50 mL    sodium chloride 0.9%: 1520 mL  Total IN: 1570 mL    OUT:    Blood Loss (mL): 1 mL    Indwelling Catheter - Urethral (mL): 900 mL  Total OUT: 901 mL    Total NET: 669 mL          Lab Data                        8.0    9.40  )-----------( 235      ( 22 Oct 2022 06:07 )             27.1     10-22    148<H>  |  115<H>  |  57<H>  ----------------------------<  129<H>  3.9   |  23  |  1.18    Ca    7.7<L>      22 Oct 2022 06:07  Phos  3.9     10-22  Mg     2.9     10-22    TPro  5.7<L>  /  Alb  1.7<L>  /  TBili  0.4  /  DBili  x   /  AST  53<H>  /  ALT  81<H>  /  AlkPhos  89  10-22            Review of Systems	      Objective     Physical Examination    heart s1s2  lung dec bS  head nc      Pertinent Lab findings & Imaging      Annalise:  NO   Adequate UO     I&O's Detail    21 Oct 2022 07:01  -  22 Oct 2022 07:00  --------------------------------------------------------  IN:    IV PiggyBack: 50 mL    sodium chloride 0.9%: 1520 mL  Total IN: 1570 mL    OUT:    Blood Loss (mL): 1 mL    Indwelling Catheter - Urethral (mL): 900 mL  Total OUT: 901 mL    Total NET: 669 mL               Discussed with:     Cultures:	        Radiology

## 2022-10-22 NOTE — PROGRESS NOTE ADULT - SUBJECTIVE AND OBJECTIVE BOX
Patient is a 76y old  Female who presents with a chief complaint of Acute on Hypoxemic respiratory failure (22 Oct 2022 08:36)      INTERVAL HPI/OVERNIGHT EVENTS:    temp is low this morning, warming blanket to be placed.       MEDICATIONS  (STANDING):  ALBUTerol    90 MICROgram(s) HFA Inhaler 2 Puff(s) Inhalation every 6 hours  cadexomer iodine 0.9% Gel 1 Application(s) Topical <User Schedule>  cefTRIAXone   IVPB 1000 milliGRAM(s) IV Intermittent every 24 hours  cefTRIAXone   IVPB      chlorhexidine 0.12% Liquid 15 milliLiter(s) Oral Mucosa every 12 hours  chlorhexidine 2% Cloths 1 Application(s) Topical two times a day  dexMEDEtomidine Infusion 0.5 MICROgram(s)/kG/Hr (6.14 mL/Hr) IV Continuous <Continuous>  dextrose 5%. 1000 milliLiter(s) (50 mL/Hr) IV Continuous <Continuous>  dextrose 5%. 1000 milliLiter(s) (100 mL/Hr) IV Continuous <Continuous>  dextrose 50% Injectable 25 Gram(s) IV Push once  dextrose 50% Injectable 12.5 Gram(s) IV Push once  dextrose 50% Injectable 25 Gram(s) IV Push once  glucagon  Injectable 1 milliGRAM(s) IntraMuscular once  heparin   Injectable 5000 Unit(s) SubCutaneous every 12 hours  insulin lispro (ADMELOG) corrective regimen sliding scale   SubCutaneous every 6 hours  ipratropium 17 MICROgram(s) HFA Inhaler 1 Puff(s) Inhalation every 6 hours  levoFLOXacin IVPB 750 milliGRAM(s) IV Intermittent every 48 hours  LORazepam     Tablet 1 milliGRAM(s) Oral every 6 hours  midodrine 10 milliGRAM(s) Oral every 8 hours  pantoprazole    Tablet 40 milliGRAM(s) Oral before breakfast  povidone iodine 10% Solution 1 Application(s) Topical daily  simethicone 80 milliGRAM(s) Chew every 12 hours  sodium chloride 0.9%. 1000 milliLiter(s) (80 mL/Hr) IV Continuous <Continuous>    MEDICATIONS  (PRN):  acetaminophen     Tablet .. 650 milliGRAM(s) Oral every 6 hours PRN Temp greater or equal to 38C (100.4F), Mild Pain (1 - 3)  aluminum hydroxide/magnesium hydroxide/simethicone Suspension 30 milliLiter(s) Oral every 4 hours PRN Dyspepsia  dextrose Oral Gel 15 Gram(s) Oral once PRN Blood Glucose LESS THAN 70 milliGRAM(s)/deciliter  LORazepam   Injectable 1 milliGRAM(s) IV Push every 4 hours PRN Agitation  melatonin 3 milliGRAM(s) Oral at bedtime PRN Insomnia  ondansetron Injectable 4 milliGRAM(s) IV Push every 8 hours PRN Nausea and/or Vomiting      Allergies    codeine (Hives)    Intolerances        REVIEW OF SYSTEMS:  unable to obtain 2/2 mental status    Vital Signs Last 24 Hrs  T(C): 35.2 (22 Oct 2022 09:00), Max: 36.7 (21 Oct 2022 16:00)  T(F): 95.3 (22 Oct 2022 09:00), Max: 98 (21 Oct 2022 16:00)  HR: 67 (22 Oct 2022 10:00) (63 - 87)  BP: 120/76 (22 Oct 2022 10:00) (99/63 - 141/77)  BP(mean): 90 (22 Oct 2022 10:00) (74 - 100)  RR: 17 (22 Oct 2022 10:00) (17 - 35)  SpO2: 100% (22 Oct 2022 10:00) (96% - 100%)    Parameters below as of 22 Oct 2022 10:00  Patient On (Oxygen Delivery Method): ventilator        PHYSICAL EXAM:  General: NAD  HEENT: NC/AT, EOMI,   Neck: trach to vent  Lungs: MV breath sounds  Heart: Regular rate and rhythm. No murmur, rub or gallop.  Abdomen: Soft. Nondistended. Nontender.   Skin: Warm. Dry.    LABS:                        8.0    9.40  )-----------( 235      ( 22 Oct 2022 06:07 )             27.1     22 Oct 2022 06:07    148    |  115    |  57     ----------------------------<  129    3.9     |  23     |  1.18     Ca    7.7        22 Oct 2022 06:07  Phos  3.9       22 Oct 2022 06:07  Mg     2.9       22 Oct 2022 06:07    TPro  5.7    /  Alb  1.7    /  TBili  0.4    /  DBili  x      /  AST  53     /  ALT  81     /  AlkPhos  89     22 Oct 2022 06:07        CAPILLARY BLOOD GLUCOSE      POCT Blood Glucose.: 126 mg/dL (22 Oct 2022 05:27)  POCT Blood Glucose.: 188 mg/dL (21 Oct 2022 23:34)  POCT Blood Glucose.: 174 mg/dL (21 Oct 2022 17:57)  POCT Blood Glucose.: 169 mg/dL (21 Oct 2022 12:40)      RADIOLOGY & ADDITIONAL TESTS:

## 2022-10-22 NOTE — PROGRESS NOTE ADULT - ASSESSMENT
75yo F with PMH of dementia, COPD with chronic resp failure and is vent dependent, s/p PEG, hx of cardiac arrest, diastolic CHF, cor pulmonale, hx of CVA, COVID-19 infxn, CKD, anemia, multiple admissions for respiratory distress (recent admission from 6/1-6/9 diagnosed with PNA with parapneumonic pleural effusion s/p thoracentesis and Avycaz) who presents from [facility] for respiratory distress again a/w sepsis and respiratory failure due to suspected recurrent gram-negative PNA.    Severe sepsis and acute hypoxic respiratory failure 2/2 gram-negative PNA   - continue current vent settings (on AC with RR 16, , PEEP 5, FiO2 100%) maintain O2 sat >92%  - was on ertapenem,  zosyn+bactrim , now on levaquin  - lactate downtrending, procalcitonin high but improving with treatment (4.76 -> 3.34 -> 1.77)  - cautious use of fluids given hx of CHF (received 1750cc of NS in ED)  - f/u blood cultures (NGTD), urine culture pending   - f/u trach sputum culture - has GNR growing awaiting speciation / sensitivities  - has hx of respiratory distress driven by vent asynchrony and would require IV benzos ; trialed IV fentanyl with adequate effect this evening  - Checked CT Abd/P to eval for any other potential source sign of infection and found no significant sign of intra-abdominal infection on CT  - cc/pulm consult (Jaime), recs appreciated  - palliative care (Emre), recs appreciated - pt is full code  C/w levofloxacin 750mg q48h based on most recent cx per ID recs  ID added ceftriaxone 1gm q24h for possible UTI/CAUTI      Diastolic CHF  Hx of Pleural effusions  - last TTE was 5/30 with EF 65% with normal LVSF, dilated   RV, and moderate pHTN -> repeat TTE appears unchanged  - may need repeat thoracentesis (had 1.5L drained three admissions ago), pulmonology consulted; was parapneumonic on that admission as opposed to transudative and thus less likely related to CHF    hyperkalemia RYAN likely due to sepsis  will give bolus of fluids: careful use going forward.  repeat Cr and K    Anemia of chronic disease  - has been evaluated by GI and hematology in the past -> dx with ACD with intermittent GI bleeding  - s/p 1 unit yesterday, another unit ordered today 2/2 today's Hb being 6.3    Hyperkalemia  - given both insulin and lasix   still remains elevated  nutrition consulted    Hx of CKD stage 3 - near baseline of 1.2-1.3  - renally dose medications and monitor BMP and electrolytes   - Cr is 1.3 with eGFR of 41, but given pt has low muscle mass from being bedbound for years, this GFR estimate is likely falsely high  - consider nephro eval   nutrition consulted    HTN  - not on any BP meds at facility  - monitor VS    DM2  - NPO with TF  - continue with insulin 8units qAM as per last admission  - c/w moderate insulin coverage scale  - goal -180    Diet  - continue with same TF from last admission    Preventive measures  - cont with heparin subq for DVT ppx  - Full Code

## 2022-10-22 NOTE — PROGRESS NOTE ADULT - SUBJECTIVE AND OBJECTIVE BOX
Covering [facility] DIVISION of INFECTIOUS DISEASE  MOIZ Chun, SOLANGE Higgins G. Casimir PARASHARAMI BREA is a 76yFemale , patient examined and chart reviewed.      INTERVAL HPI/ OVERNIGHT EVENTS:  Afebrile Vegetative state.  Chronic vent.    Past Medical History--  PAST MEDICAL & SURGICAL HISTORY:  Dementia of frontal lobe type  Aphasic stroke  Diabetes mellitus  Respiratory failure  Hypertension  GERD (gastroesophageal reflux disease)  Constipation  Respiratory failure  CVA (cerebral vascular accident)  HTN (hypertension)  DM (diabetes mellitus)  Advanced dementia  COVID-19 virus detected  Quadriplegia  Pneumonia  Type II diabetes mellitus  Hx of appendectomy  Gastrostomy in place  Tracheostomy in place        For details regarding the patient's social history, family history, and other miscellaneous elements, please refer the initial infectious diseases consultation and/or the admitting history and physical examination for this admission.      ROS:  Unable to obtain due to : Pt's condition    ALLERGIES  codeine (Hives)      Current inpatient medications :    ANTIBIOTICS/RELEVANT:  cefTRIAXone   IVPB 1000 milliGRAM(s) IV Intermittent every 24 hours  cefTRIAXone   IVPB      levoFLOXacin IVPB 750 milliGRAM(s) IV Intermittent every 48 hours      acetaminophen     Tablet .. 650 milliGRAM(s) Oral every 6 hours PRN  ALBUTerol    90 MICROgram(s) HFA Inhaler 2 Puff(s) Inhalation every 6 hours  aluminum hydroxide/magnesium hydroxide/simethicone Suspension 30 milliLiter(s) Oral every 4 hours PRN  cadexomer iodine 0.9% Gel 1 Application(s) Topical <User Schedule>  chlorhexidine 0.12% Liquid 15 milliLiter(s) Oral Mucosa every 12 hours  chlorhexidine 2% Cloths 1 Application(s) Topical two times a day  dexMEDEtomidine Infusion 0.5 MICROgram(s)/kG/Hr IV Continuous <Continuous>  dextrose 5%. 1000 milliLiter(s) IV Continuous <Continuous>  dextrose 5%. 1000 milliLiter(s) IV Continuous <Continuous>  dextrose 50% Injectable 25 Gram(s) IV Push once  dextrose 50% Injectable 12.5 Gram(s) IV Push once  dextrose 50% Injectable 25 Gram(s) IV Push once  dextrose Oral Gel 15 Gram(s) Oral once PRN  glucagon  Injectable 1 milliGRAM(s) IntraMuscular once  heparin   Injectable 5000 Unit(s) SubCutaneous every 12 hours  insulin lispro (ADMELOG) corrective regimen sliding scale   SubCutaneous every 6 hours  ipratropium 17 MICROgram(s) HFA Inhaler 1 Puff(s) Inhalation every 6 hours  LORazepam     Tablet 1 milliGRAM(s) Oral every 6 hours  LORazepam   Injectable 1 milliGRAM(s) IV Push every 4 hours PRN  melatonin 3 milliGRAM(s) Oral at bedtime PRN  midodrine 10 milliGRAM(s) Oral every 8 hours  ondansetron Injectable 4 milliGRAM(s) IV Push every 8 hours PRN  pantoprazole    Tablet 40 milliGRAM(s) Oral before breakfast  povidone iodine 10% Solution 1 Application(s) Topical daily  simethicone 80 milliGRAM(s) Chew every 12 hours      Objective:    10-21 @ 07:01  -  10-22 @ 07:00  --------------------------------------------------------  IN: 1570 mL / OUT: 901 mL / NET: 669 mL    10-22 @ 07:01  -  10-23 @ 00:00  --------------------------------------------------------  IN: 1950 mL / OUT: 350 mL / NET: 1600 mL      T(C): 37.4 (10-22-22 @ 21:20), Max: 37.4 (10-22-22 @ 21:20)  HR: 87 (10-22-22 @ 22:02) (63 - 87)  BP: 111/64 (10-22-22 @ 22:00) (99/63 - 133/72)  RR: 24 (10-22-22 @ 22:00) (17 - 35)  SpO2: 100% (10-22-22 @ 22:02) (96% - 100%)  Mode: AC/ CMV (Assist Control/ Continuous Mandatory Ventilation), RR (machine): 18, TV (machine): 400, FiO2: 70, PEEP: 5, ITime: 1, MAP: 17, PIP: 29    Physical Exam:  GEN: Vegetative state chronic vent  HEENT: normocephalic and atraumatic.   NECK: Supple.   LUNGS: Decreased to auscultation.  HEART: Regular rate and rhythm without murmur.  ABDOMEN: Soft, nontender, and nondistended.  Positive bowel sounds.  +G tube  EXTREMITIES: + edema.  NEUROLOGIC: Vegetative state unresponsive  SKIN: No rash    LABS:                        8.0    9.40  )-----------( 235      ( 22 Oct 2022 06:07 )             27.1       10-22    148<H>  |  115<H>  |  57<H>  ----------------------------<  129<H>  3.9   |  23  |  1.18    Ca    7.7<L>      22 Oct 2022 06:07  Phos  3.9     10-22  Mg     2.9     10-22    TPro  5.7<L>  /  Alb  1.7<L>  /  TBili  0.4  /  DBili  x   /  AST  53<H>  /  ALT  81<H>  /  AlkPhos  89  10-22    MICROBIOLOGY:  Culture - Sputum (collected 10-19-22 @ 00:58)  Source: .Sputum Sputum trap  Gram Stain (10-19-22 @ 19:24):    Few Squamous epithelial cells per low power field    Few polymorphonuclear leukocytes per low power field    Few Gram positive cocci in pairs per oil power field  Preliminary Report (10-20-22 @ 20:02):    Moderate Mixed gram negative rods including    Moderate Stenotrophomonas maltophilia    Culture - Urine (collected 10-18-22 @ 12:27)  Source: Clean Catch Clean Catch (Midstream)  Final Report (10-21-22 @ 08:18):    >100,000 CFU/ml Escherichia coli  Organism: Escherichia coli (10-21-22 @ 08:18)  Organism: Escherichia coli (10-21-22 @ 08:18)      -  Amikacin: S <=16      -  Amoxicillin/Clavulanic Acid: S <=8/4      -  Ampicillin: R >16 These ampicillin results predict results for amoxicillin      -  Ampicillin/Sulbactam: I 16/8 Enterobacter, Klebsiella aerogenes, Citrobacter, and Serratia may develop resistance during prolonged therapy (3-4 days)      -  Aztreonam: S <=4      -  Cefazolin: S <=2 For uncomplicated UTI with K. pneumoniae, E. coli, or P. mirablis: PRATIK <=16 is sensitive and PRATIK >=32 is resistant. This also predicts results for oral agents cefaclor, cefdinir, cefpodoxime, cefprozil, cefuroxime axetil, cephalexin and locarbef for uncomplicated UTI. Note that some isolates may be susceptible to these agents while testing resistant to cefazolin.      -  Cefepime: S <=2      -  Cefoxitin: S <=8      -  Ceftriaxone: S <=1 Enterobacter, Klebsiella aerogenes, Citrobacter, and Serratia may develop resistance during prolonged therapy      -  Ciprofloxacin: R >2      -  Ertapenem: I 1      -  Gentamicin: R >8      -  Imipenem: S <=1      -  Levofloxacin: R >4      -  Meropenem: S <=1      -  Nitrofurantoin: S <=32 Should not be used to treat pyelonephritis      -  Piperacillin/Tazobactam: S <=8      -  Tigecycline: S <=2      -  Tobramycin: I 8      -  Trimethoprim/Sulfamethoxazole: R >2/38      Method Type: PRATIK    Culture - Blood (collected 10-18-22 @ 12:00)  Source: .Blood Blood-Peripheral  Preliminary Report (10-19-22 @ 17:01):    No growth to date.    Culture - Blood (collected 10-18-22 @ 12:00)  Source: .Blood Blood-Peripheral  Preliminary Report (10-19-22 @ 17:01):    No growth to date.      Radiology:   ACC: [id number] EXAM:  CT CHEST                          PROCEDURE DATE:  10/18/2022          INTERPRETATION:  CLINICAL INFORMATION: Hypoxia    COMPARISON: 8/18/2022    CONTRAST/COMPLICATIONS:  IV Contrast: NONE  Oral Contrast: NONE  Complications: None reported at time of study completion    PROCEDURE:  CT of the Chest was performed.  Sagittal and coronal reformats were performed.    FINDINGS:    LUNGS AND AIRWAYS: Patent central airways.  Lungs are remarkable for   groundglass opacities in the posterior upper lobes with interlobular   septal thickening. Infiltrate or atelectasis is seen in the lower lobes   left more than right. Patchy densities are also seen in the posterior   upper lobes. Tracheostomy is again appreciated  PLEURA: Small right and small to moderate left pleural effusion with a   large subpulmonic component.  MEDIASTINUM AND JHONNY: Limited for evaluation of adenopathy without IV   contrast. However there are again enlarged mediastinal lymph nodes likely   reactive. The upper esophagus is air filled  VESSELS: Atherosclerotic calcification of the aorta and coronary arteries.  HEART: Heart size is normal. No pericardial effusion.  CHEST WALL AND LOWER NECK: Within normal limits.  VISUALIZED UPPER ABDOMEN: PEG tube is seen. Adrenal glands are thickened.  BONES: Degenerative changes. Again seen is compression fracture of the L3   vertebral body    IMPRESSION: Groundglass opacities and interlobular septal thickening   suggestive of CHF. There are also some more confluent areas of density   which may represent atelectasis versus infiltrates.  Bilateral pleural effusions have decreased from the prior exam  Tracheostomy and gastrostomy      ACC: [id number] EXAM:  CT ABDOMEN AND PELVIS                          PROCEDURE DATE:  10/20/2022          INTERPRETATION:  CLINICAL INFORMATION: 76 years  Female with ro rp bleed.    COMPARISON: 8/20/2022    CONTRAST/COMPLICATIONS:  IV Contrast: NONE  Oral Contrast: NONE  Complications: None reported at time of study completion    PROCEDURE:  CT of the Abdomen and Pelvis was performed.  Sagittal and coronal reformats were performed.    FINDINGS:  LOWER CHEST: Moderate bilateral pleural effusionswith underlying passive   atelectasis, unchanged. Right central line tip in the superior vena cava.    LIVER: Within normal limits.  BILE DUCTS: Normal caliber.  GALLBLADDER: Within normal limits.  SPLEEN: Within normal limits.  PANCREAS: Within normal limits.  ADRENALS: Within normal limits.  KIDNEYS/URETERS: Within normal limits.    BLADDER: Collapsed around Woody catheter.  REPRODUCTIVE ORGANS: Unremarkable uterus.    BOWEL: No bowel obstruction. Appendix is normal.. Gastrostomy tube   balloonin the stomach. Mildly distended rectum with liquid stool  PERITONEUM: Trace ascites.  VESSELS: Within normal limits.  RETROPERITONEUM/LYMPH NODES: No lymphadenopathy. No retroperitoneal   hemorrhage.  ABDOMINAL WALL: Within normal limits.  BONES: Moderate chronic L2 compression fracture deformity. Stable   deformity of the left proximal femur.    IMPRESSION:    No retroperitoneal hemorrhage.    Stable moderate bilateral pleural effusions with underlying compressive   atelectasis.    Small ascites.    Mildly distended rectum with liquid stool.      Pt is a 76W w/ PMHx of DM2, COPD, HTN, dementia, hx of subarachnoid hemorrhage, and history of chronic trach brought in by ambulance for evaluation of low CO2 and cyanotic appearance.   Well-known and has had multiple admissions for Acute on chronic RF and PNA w/ MDROs    Acute VAP/PNA  Acute on chronic HF  CAUTI/UTI  - pt p/w cyanosis  - most recent admission in end of August, most recent cx w/ Pseudomonas and Stenotrophomonas  - labs notable for leukocytosis to 13  - CT chest Groundglass opacities and interlobular septal thickening suggestive of CHF. There are also some more confluent areas of density   which may represent atelectasis versus infiltrates.Bilateral pleural effusions have decreased from the prior exam  - s/p levofloxacin in the ED  - UCx E.coli   - BCx NGTD  - sputum cx w/ Stenotrophomonas maltophilia/GNR      Plan:   F/u pending cx and susceptibilities  C/w levofloxacin 750mg q48h  Cont ceftriaxone 1gm q24h for possible UTI/CAUTI  Isolation per infection control policy given hx of CRE  Supportive care/vent management per CCM      Continue with present regiment.  Appropriate use of antibiotics and adverse effects reviewed.      Critical care time greater then 35 minutes reviewing notes, labs data/ imaging , discussion with multidisciplinary team.    Thank you for allowing me to participate in care of your patient .        Eusebio Chairez MD  Infectious Disease  [phone]

## 2022-10-22 NOTE — PROGRESS NOTE ADULT - SUBJECTIVE AND OBJECTIVE BOX
Chief Complaint: Hypoxia    Interval Events: No events overnight.    Review of Systems:  Unable to obtain    Physical Exam:  Vital Signs Last 24 Hrs  T(C): 35.2 (22 Oct 2022 09:00), Max: 36.7 (21 Oct 2022 16:00)  T(F): 95.3 (22 Oct 2022 09:00), Max: 98 (21 Oct 2022 16:00)  HR: 67 (22 Oct 2022 10:00) (63 - 87)  BP: 120/76 (22 Oct 2022 10:00) (99/63 - 141/77)  BP(mean): 90 (22 Oct 2022 10:00) (74 - 100)  RR: 17 (22 Oct 2022 10:00) (17 - 35)  SpO2: 100% (22 Oct 2022 10:00) (96% - 100%)  Parameters below as of 22 Oct 2022 10:00  Patient On (Oxygen Delivery Method): ventilator  General: Unresponsive  HEENT: Trach  Neck: No JVD, no carotid bruit  Lungs: CTAB  CV: RRR, nl S1/S2, no M/R/G  Abdomen: S/NT/ND, +BS  Extremities: No LE edema, no cyanosis  Neuro: AAOx0  Skin: No rash    Labs:    10-22    148<H>  |  115<H>  |  57<H>  ----------------------------<  129<H>  3.9   |  23  |  1.18    Ca    7.7<L>      22 Oct 2022 06:07  Phos  3.9     10-22  Mg     2.9     10-22    TPro  5.7<L>  /  Alb  1.7<L>  /  TBili  0.4  /  DBili  x   /  AST  53<H>  /  ALT  81<H>  /  AlkPhos  89  10-22                        8.0    9.40  )-----------( 235      ( 22 Oct 2022 06:07 )             27.1       Telemetry: Sinus rhythm

## 2022-10-22 NOTE — PROGRESS NOTE ADULT - ASSESSMENT
Impression - 77 y/o female, FULL CODE, with PMHx of dementia, COPD with chronic resp failure and is vent dependent, s/p Trach/PEG, hx of cardiac arrest, CHF, cor pulmonale, CVA, COVID-19 infxn, CKD, anemia, multiple admissions for respiratory distress (last admission from 6/1-6/9  and now august diagnosed with PNA with parapneumonic pleural effusion s/p course of Ertapenem who presents from [facility] for respiratory distress . Patient not verbal, demented unable to obtain any hx. Patient was admitted to the S[facility] for acute on chronic hypoxic respiratory failure 2/2 to pna, sepsis, chronic anemia, RYAN, hyperK, hyperglycemia and mild hypoNa. Transfused PRBC x 2. Patient's Hgb 7.4<--8.2, CT ABD/Pel significant for moderate B/L pleural effusions with atelectasis, but did not reveal retroperitoneal hemorrhage. No additional events reported throughout the day. Repeat Hgb up-trending 8.0<--7.4, CTN downtrending, BG well controlled <180, and magnesium noted to be slightly elevated.    1. Acute on chronic hypoxemic resp failure  2. Acute and chronic anemia  3. RYAN on CKD, improving  4. Hyperkalemia, improving  5. PNA/VAP  6. Sepsis  7. UTI  8. Hypermagnesemia     Neuro - AAOx0 at baseline/non-verbal, continue ativan TID. Remains off precedex, will continue to monitor closely and consider PRN IVP and/or gtt sedation for vent synchrony.  Cardiac - BP stable MAP>65, Hgb up-trending 8.0<--7.4<--8.2, trend cbc, transfuse PRN, trend lytes, replete PRN, hyper K+ improved, magnesium elevated, will repeat in AM, prior TTE from 8/22 normal LV systolic function w/moderate PAH, cardiology following and input appreciated   Resp - Continues on full vent support w/6cc/kg of IBW per ARDS net protocol, actively titrating FIO2 w/goal SPO2>90%, utilization of PEEP for alveolar recruitment, trend ABG, chest PT, likely parapneumonic B/L pleural effusions    GI - Con, daily PPI, PRN Zofran for N/V, PRN bowel regimen  Renal - RYAN improving, strict I/Os, +vaughn cath, IVF, goal UOP .5cc/kg, avoid nephrotoxic agents, renal dose meds  Endo - ISS protocol initiated, strict glucose control w/goal BG <180, A1c 8.5  Heme - DVT PPx w/UFH SQ, SCDs  ID - Leukocytosis improved, continue to trend CBC/fevers, antipyretics PRN, UCx+ E coli - start ceftriaxone 1G Q24/hrs, BCx NGT, continue levofloxacin 750 IVPB Q48/hrs for PNA, ID following and input appreciated  Impression - 77 y/o female, FULL CODE, with PMHx of dementia, COPD with chronic resp failure and is vent dependent, s/p Trach/PEG, hx of cardiac arrest, CHF, cor pulmonale, CVA, COVID-19 infxn, CKD, anemia, multiple admissions for respiratory distress (last admission from 6/1-6/9  and now august diagnosed with PNA with parapneumonic pleural effusion s/p course of Ertapenem who presents from [facility] for respiratory distress . Patient not verbal, demented unable to obtain any hx. Patient was admitted to the SPCU for acute on chronic hypoxic respiratory failure 2/2 to pna, sepsis, chronic anemia, RAYN, hyperK, hyperglycemia and mild hypoNa. Transfused PRBC x 2. Patient's Hgb 7.4<--8.2, CT ABD/Pel significant for moderate B/L pleural effusions with atelectasis, but did not reveal retroperitoneal hemorrhage. No additional events reported throughout the day. Repeat Hgb up-trending 8.0<--7.4, CTN downtrending, BG well controlled <180, and magnesium noted to be slightly elevated.    1. Acute on chronic hypoxemic resp failure  2. Acute and chronic anemia  3. RYAN on CKD, improving  4. Hyperkalemia, improving  5. PNA/VAP  6. Sepsis  7. UTI  8. Hypermagnesemia     Neuro - AAOx0 at baseline/non-verbal, continue ativan TID. Remains off precedex, will continue to monitor closely and consider PRN IVP and/or gtt sedation for vent synchrony.  Cardiac - BP stable MAP>65 on midodrine TID, Hgb up-trending 8.0<--7.4<--8.2, trend cbc, transfuse PRN, trend lytes, replete PRN, hyper K+ improved, magnesium elevated, will repeat in AM, prior TTE from 8/22 normal LV systolic function w/moderate PAH, cardiology following and input appreciated   Resp - Continues on full vent support w/6cc/kg of IBW per ARDS net protocol, actively titrating FIO2 w/goal SPO2>90%, utilization of PEEP for alveolar recruitment, trend ABG, chest PT, likely parapneumonic B/L pleural effusions    GI - Continue tube feed 20ml/hr w/H2O flushes Q6/hrs, daily PPI, PRN Zofran for N/V, PRN bowel regimen  Renal - RYAN improving, strict I/Os, +vaughn cath, IVF, goal UOP .5cc/kg, avoid nephrotoxic agents, renal dose meds  Endo - ISS protocol initiated, BG has been stable, strict glucose control w/goal BG <180, A1c 8.5  Heme - DVT PPx w/UFH SQ, SCDs  ID - Leukocytosis improved, continue to trend CBC/fevers, antipyretics PRN, UCx+ E coli - continue ceftriaxone 1G Q24/hrs, BCx NGT, continue levofloxacin 750 IVPB Q48/hrs for PNA, ID following and input appreciated  Impression - 75 y/o female, FULL CODE, with PMHx of dementia, COPD with chronic resp failure and is vent dependent, s/p Trach/PEG, hx of cardiac arrest, CHF, cor pulmonale, CVA, COVID-19 infxn, CKD, anemia, multiple admissions for respiratory distress (last admission from 6/1-6/9  and now august diagnosed with PNA with parapneumonic pleural effusion s/p course of Ertapenem who presents from [facility] for respiratory distress . Patient not verbal, demented unable to obtain any hx. Patient was admitted to the SPCU for acute on chronic hypoxic respiratory failure 2/2 to pna, sepsis, chronic anemia, RYAN, hyperK, hyperglycemia and mild hypoNa. Transfused PRBC x 2. Patient's Hgb 7.4<--8.2, CT ABD/Pel significant for moderate B/L pleural effusions with atelectasis, but did not reveal retroperitoneal hemorrhage. No additional events reported throughout the day. Repeat Hgb up-trending 8.0<--7.4, CTN downtrending, BG well controlled <180, and magnesium noted to be slightly elevated. Unable to wean FIO2 requirements on vent.    1. Acute on chronic hypoxemic resp failure  2. Acute and chronic anemia  3. RYAN on CKD, improving  4. Hyperkalemia, improving  5. PNA/VAP  6. Sepsis  7. UTI  8. Hypermagnesemia     Neuro - AAOx0 at baseline/non-verbal, continue ativan TID. Remains off precedex, will continue to monitor closely and consider PRN IVP and/or gtt sedation for vent synchrony.  Cardiac - BP stable MAP>65 on midodrine TID, Hgb up-trending 8.0<--7.4<--8.2, trend cbc, transfuse PRN, trend lytes, replete PRN, hyper K+ improved, magnesium elevated, will repeat in AM, prior TTE from 8/22 normal LV systolic function w/moderate PAH, cardiology following and input appreciated   Resp - Continues on full vent support w/6cc/kg of IBW per ARDS net protocol, actively titrating FIO2 w/goal SPO2>90%, utilization of PEEP for alveolar recruitment, trend ABG, chest PT, likely parapneumonic B/L pleural effusions    GI - Continue tube feed 20ml/hr w/H2O flushes Q6/hrs, daily PPI, PRN Zofran for N/V, PRN bowel regimen  Renal - RYAN improving, strict I/Os, +vaughn cath, IVF, goal UOP .5cc/kg, avoid nephrotoxic agents, renal dose meds  Endo - ISS protocol initiated, BG has been stable, strict glucose control w/goal BG <180, A1c 8.5  Heme - DVT PPx w/UFH SQ, SCDs  ID - Leukocytosis improved, continue to trend CBC/fevers, antipyretics PRN, UCx+ E coli - continue ceftriaxone 1G Q24/hrs, BCx NGT, continue levofloxacin 750 IVPB Q48/hrs for PNA, ID following and input appreciated       Critical Care time: 35 mins assessing presenting problems of acute illness that poses high probability of life threatening deterioration or end organ damage/dysfunction.  Medical decision making including initiating plan of care, reviewing data, reviewing radiology, direct patient bedside evaluation and interpretation of vital signs, any necessary ventilator management, discussion with multidisciplinary team, discussing goals of care with patient/family, all non inclusive of procedures

## 2022-10-23 LAB
ANION GAP SERPL CALC-SCNC: 9 MMOL/L — SIGNIFICANT CHANGE UP (ref 5–17)
BUN SERPL-MCNC: 57 MG/DL — HIGH (ref 7–23)
CALCIUM SERPL-MCNC: 7.8 MG/DL — LOW (ref 8.4–10.5)
CHLORIDE SERPL-SCNC: 113 MMOL/L — HIGH (ref 96–108)
CO2 SERPL-SCNC: 24 MMOL/L — SIGNIFICANT CHANGE UP (ref 22–31)
CREAT SERPL-MCNC: 1.25 MG/DL — SIGNIFICANT CHANGE UP (ref 0.5–1.3)
CULTURE RESULTS: SIGNIFICANT CHANGE UP
CULTURE RESULTS: SIGNIFICANT CHANGE UP
EGFR: 44 ML/MIN/1.73M2 — LOW
GLUCOSE BLDC GLUCOMTR-MCNC: 172 MG/DL — HIGH (ref 70–99)
GLUCOSE BLDC GLUCOMTR-MCNC: 190 MG/DL — HIGH (ref 70–99)
GLUCOSE BLDC GLUCOMTR-MCNC: 197 MG/DL — HIGH (ref 70–99)
GLUCOSE SERPL-MCNC: 194 MG/DL — HIGH (ref 70–99)
HCT VFR BLD CALC: 29.5 % — LOW (ref 34.5–45)
HGB BLD-MCNC: 8.7 G/DL — LOW (ref 11.5–15.5)
MAGNESIUM SERPL-MCNC: 2.6 MG/DL — SIGNIFICANT CHANGE UP (ref 1.6–2.6)
MCHC RBC-ENTMCNC: 26.9 PG — LOW (ref 27–34)
MCHC RBC-ENTMCNC: 29.5 GM/DL — LOW (ref 32–36)
MCV RBC AUTO: 91 FL — SIGNIFICANT CHANGE UP (ref 80–100)
NRBC # BLD: 2 /100 WBCS — HIGH (ref 0–0)
PHOSPHATE SERPL-MCNC: 3.8 MG/DL — SIGNIFICANT CHANGE UP (ref 2.5–4.5)
PLATELET # BLD AUTO: 283 K/UL — SIGNIFICANT CHANGE UP (ref 150–400)
POTASSIUM SERPL-MCNC: 4 MMOL/L — SIGNIFICANT CHANGE UP (ref 3.5–5.3)
POTASSIUM SERPL-SCNC: 4 MMOL/L — SIGNIFICANT CHANGE UP (ref 3.5–5.3)
RBC # BLD: 3.24 M/UL — LOW (ref 3.8–5.2)
RBC # FLD: 18.1 % — HIGH (ref 10.3–14.5)
SODIUM SERPL-SCNC: 146 MMOL/L — HIGH (ref 135–145)
SPECIMEN SOURCE: SIGNIFICANT CHANGE UP
SPECIMEN SOURCE: SIGNIFICANT CHANGE UP
WBC # BLD: 12.38 K/UL — HIGH (ref 3.8–10.5)
WBC # FLD AUTO: 12.38 K/UL — HIGH (ref 3.8–10.5)

## 2022-10-23 PROCEDURE — 99233 SBSQ HOSP IP/OBS HIGH 50: CPT

## 2022-10-23 RX ORDER — FENTANYL CITRATE 50 UG/ML
25 INJECTION INTRAVENOUS ONCE
Refills: 0 | Status: DISCONTINUED | OUTPATIENT
Start: 2022-10-23 | End: 2022-10-23

## 2022-10-23 RX ADMIN — Medication 1 PUFF(S): at 08:15

## 2022-10-23 RX ADMIN — HEPARIN SODIUM 5000 UNIT(S): 5000 INJECTION INTRAVENOUS; SUBCUTANEOUS at 17:37

## 2022-10-23 RX ADMIN — CHLORHEXIDINE GLUCONATE 15 MILLILITER(S): 213 SOLUTION TOPICAL at 17:35

## 2022-10-23 RX ADMIN — ALBUTEROL 2 PUFF(S): 90 AEROSOL, METERED ORAL at 08:15

## 2022-10-23 RX ADMIN — Medication 1 MILLIGRAM(S): at 11:40

## 2022-10-23 RX ADMIN — Medication 1 PUFF(S): at 14:52

## 2022-10-23 RX ADMIN — Medication 2: at 06:01

## 2022-10-23 RX ADMIN — Medication 2: at 11:41

## 2022-10-23 RX ADMIN — Medication 1 PUFF(S): at 00:16

## 2022-10-23 RX ADMIN — ALBUTEROL 2 PUFF(S): 90 AEROSOL, METERED ORAL at 14:51

## 2022-10-23 RX ADMIN — CHLORHEXIDINE GLUCONATE 1 APPLICATION(S): 213 SOLUTION TOPICAL at 17:36

## 2022-10-23 RX ADMIN — HEPARIN SODIUM 5000 UNIT(S): 5000 INJECTION INTRAVENOUS; SUBCUTANEOUS at 05:06

## 2022-10-23 RX ADMIN — MIDODRINE HYDROCHLORIDE 10 MILLIGRAM(S): 2.5 TABLET ORAL at 05:06

## 2022-10-23 RX ADMIN — Medication 1 MILLIGRAM(S): at 06:19

## 2022-10-23 RX ADMIN — Medication 1 MILLIGRAM(S): at 17:37

## 2022-10-23 RX ADMIN — SIMETHICONE 80 MILLIGRAM(S): 80 TABLET, CHEWABLE ORAL at 05:06

## 2022-10-23 RX ADMIN — Medication 2: at 17:43

## 2022-10-23 RX ADMIN — Medication 1 PUFF(S): at 20:23

## 2022-10-23 RX ADMIN — MIDODRINE HYDROCHLORIDE 10 MILLIGRAM(S): 2.5 TABLET ORAL at 22:09

## 2022-10-23 RX ADMIN — CHLORHEXIDINE GLUCONATE 15 MILLILITER(S): 213 SOLUTION TOPICAL at 05:07

## 2022-10-23 RX ADMIN — Medication 1 MILLIGRAM(S): at 22:12

## 2022-10-23 RX ADMIN — ALBUTEROL 2 PUFF(S): 90 AEROSOL, METERED ORAL at 00:16

## 2022-10-23 RX ADMIN — ALBUTEROL 2 PUFF(S): 90 AEROSOL, METERED ORAL at 20:23

## 2022-10-23 RX ADMIN — SIMETHICONE 80 MILLIGRAM(S): 80 TABLET, CHEWABLE ORAL at 17:36

## 2022-10-23 RX ADMIN — PANTOPRAZOLE SODIUM 40 MILLIGRAM(S): 20 TABLET, DELAYED RELEASE ORAL at 06:22

## 2022-10-23 RX ADMIN — Medication 1 MILLIGRAM(S): at 04:55

## 2022-10-23 RX ADMIN — MIDODRINE HYDROCHLORIDE 10 MILLIGRAM(S): 2.5 TABLET ORAL at 14:12

## 2022-10-23 RX ADMIN — Medication 1 APPLICATION(S): at 11:40

## 2022-10-23 RX ADMIN — Medication 1 MILLIGRAM(S): at 00:06

## 2022-10-23 RX ADMIN — CHLORHEXIDINE GLUCONATE 1 APPLICATION(S): 213 SOLUTION TOPICAL at 05:07

## 2022-10-23 RX ADMIN — CEFTRIAXONE 100 MILLIGRAM(S): 500 INJECTION, POWDER, FOR SOLUTION INTRAMUSCULAR; INTRAVENOUS at 14:11

## 2022-10-23 NOTE — PROGRESS NOTE ADULT - SUBJECTIVE AND OBJECTIVE BOX
BREA BECKHAM     [facility]    Allergies    codeine (Hives)    Intolerances        PAST MEDICAL & SURGICAL HISTORY:  Dementia of frontal lobe type      Aphasic stroke      Diabetes mellitus      Respiratory failure      Hypertension      GERD (gastroesophageal reflux disease)      Constipation      Respiratory failure      CVA (cerebral vascular accident)      HTN (hypertension)      DM (diabetes mellitus)      Advanced dementia      COVID-19 virus detected      Quadriplegia      Pneumonia      Type II diabetes mellitus      Hx of appendectomy      Gastrostomy in place      Tracheostomy in place      Tracheostomy tube present      Feeding by G-tube          FAMILY HISTORY:      Home Medications:  albuterol 90 mcg/inh inhalation aerosol with adapter: 2  inhaled every 6 hours (18 Oct 2022 11:42)  Bacid (LAC) oral tablet: 2 tab(s) by gastrostomy tube once a day (18 Oct 2022 11:42)  Betadine 10% topical swab: cleanse right and left great toes (18 Oct 2022 11:42)  chlorhexidine 0.12% mucous membrane liquid: 15 milliliter(s) mucous membrane 2 times a day (18 Oct 2022 11:42)  Eucerin topical cream: Apply topically to affected area once a day bilateral feet (18 Oct 2022 11:42)  insulin glargine 100 units/mL subcutaneous solution: 8 unit(s) subcutaneous once a day (in the morning) (18 Oct 2022 11:42)  ipratropium-albuterol 0.5 mg-2.5 mg/3 mL inhalation solution: 3 milliliter(s) inhaled 4 times a day (18 Oct 2022 11:42)  LORazepam 1 mg oral tablet: 1 tab(s) by gastrostomy tube every 4 hours (18 Oct 2022 11:42)  methocarbamol 500 mg oral tablet: 1 tab(s) by gastrostomy tube 2 times a day (18 Oct 2022 11:42)  MiraLax oral powder for reconstitution: g-tube (18 Oct 2022 13:04)  Multiple Vitamins oral tablet: 1 tab(s) orally once a day (18 Oct 2022 11:42)  nystatin 100,000 units/g topical powder: 1 application topically 3 times a day (18 Oct 2022 11:42)  omeprazole 20 mg oral delayed release capsule: orally 2 times a day (18 Oct 2022 11:42)  polyethylene glycol 3350 oral powder for reconstitution: 17 gram(s) by gastrostomy tube every 12 hours (18 Oct 2022 11:42)  senna 8.6 mg oral tablet: 3 tab(s) by gastrostomy tube once a day (at bedtime) (18 Oct 2022 11:42)  simethicone 80 mg oral tablet: g-tube (18 Oct 2022 13:04)  simethicone 80 mg oral tablet, chewable: 1 tab(s) by gastrostomy tube every 6 hours (18 Oct 2022 11:42)  sucralfate 1 g/10 mL oral suspension: 10 milliliter(s) g-tube 4 times a day (before meals and at bedtime) (18 Oct 2022 13:04)  Tylenol 325 mg oral tablet: 2 tab(s) by gastrostomy tube once a day; 60 minutes prior to dressing change  (18 Oct 2022 11:42)  Tylenol 325 mg oral tablet: 2 tab(s) by gastrostomy tube every 6 hours, As Needed (18 Oct 2022 11:42)      MEDICATIONS  (STANDING):  ALBUTerol    90 MICROgram(s) HFA Inhaler 2 Puff(s) Inhalation every 6 hours  cadexomer iodine 0.9% Gel 1 Application(s) Topical <User Schedule>  cefTRIAXone   IVPB 1000 milliGRAM(s) IV Intermittent every 24 hours  cefTRIAXone   IVPB      chlorhexidine 0.12% Liquid 15 milliLiter(s) Oral Mucosa every 12 hours  chlorhexidine 2% Cloths 1 Application(s) Topical two times a day  dexMEDEtomidine Infusion 0.5 MICROgram(s)/kG/Hr (6.14 mL/Hr) IV Continuous <Continuous>  dextrose 5%. 1000 milliLiter(s) (50 mL/Hr) IV Continuous <Continuous>  dextrose 5%. 1000 milliLiter(s) (100 mL/Hr) IV Continuous <Continuous>  dextrose 50% Injectable 25 Gram(s) IV Push once  dextrose 50% Injectable 12.5 Gram(s) IV Push once  dextrose 50% Injectable 25 Gram(s) IV Push once  glucagon  Injectable 1 milliGRAM(s) IntraMuscular once  heparin   Injectable 5000 Unit(s) SubCutaneous every 12 hours  insulin lispro (ADMELOG) corrective regimen sliding scale   SubCutaneous every 6 hours  ipratropium 17 MICROgram(s) HFA Inhaler 1 Puff(s) Inhalation every 6 hours  levoFLOXacin IVPB 750 milliGRAM(s) IV Intermittent every 48 hours  LORazepam     Tablet 1 milliGRAM(s) Oral every 6 hours  midodrine 10 milliGRAM(s) Oral every 8 hours  pantoprazole    Tablet 40 milliGRAM(s) Oral before breakfast  povidone iodine 10% Solution 1 Application(s) Topical daily  simethicone 80 milliGRAM(s) Chew every 12 hours    MEDICATIONS  (PRN):  acetaminophen     Tablet .. 650 milliGRAM(s) Oral every 6 hours PRN Temp greater or equal to 38C (100.4F), Mild Pain (1 - 3)  aluminum hydroxide/magnesium hydroxide/simethicone Suspension 30 milliLiter(s) Oral every 4 hours PRN Dyspepsia  dextrose Oral Gel 15 Gram(s) Oral once PRN Blood Glucose LESS THAN 70 milliGRAM(s)/deciliter  LORazepam   Injectable 1 milliGRAM(s) IV Push every 4 hours PRN Agitation  melatonin 3 milliGRAM(s) Oral at bedtime PRN Insomnia  ondansetron Injectable 4 milliGRAM(s) IV Push every 8 hours PRN Nausea and/or Vomiting      Diet, NPO with Tube Feed:   Tube Feeding Modality: Gastrostomy  Nepro with Carb Steady  Total Volume for 24 Hours (mL): 800  Continuous  Starting Tube Feed Rate mL per Hour: 20  Increase Tube Feed Rate by (mL): 10     Every 4 hours  Until Goal Tube Feed Rate (mL per Hour): 40  Tube Feed Duration (in Hours): 20  Tube Feed Start Time: 16:00  Tube Feed Stop Time: 12:00  Free Water Flush  Bolus   Total Volume per Flush (mL): 250   Frequency: Every 6 Hours  Lucas(7 Gm Arginine/7 Gm Glut/1.2 Gm HMB     Qty per Day:  1 (10-19-22 @ 13:57) [Active]          Vital Signs Last 24 Hrs  T(C): 36.8 (23 Oct 2022 07:00), Max: 37.4 (22 Oct 2022 21:20)  T(F): 98.3 (23 Oct 2022 07:00), Max: 99.4 (23 Oct 2022 02:00)  HR: 81 (23 Oct 2022 09:00) (67 - 130)  BP: 116/60 (23 Oct 2022 09:00) (101/66 - 178/92)  BP(mean): 77 (23 Oct 2022 09:00) (76 - 111)  RR: 25 (23 Oct 2022 09:00) (21 - 36)  SpO2: 96% (23 Oct 2022 09:00) (95% - 100%)    Parameters below as of 23 Oct 2022 09:00  Patient On (Oxygen Delivery Method): ventilator    O2 Concentration (%): 45      10-22-22 @ 07:01  -  10-23-22 @ 07:00  --------------------------------------------------------  IN: 2440 mL / OUT: 750 mL / NET: 1690 mL        Mode: AC/ CMV (Assist Control/ Continuous Mandatory Ventilation), RR (machine): 18, TV (machine): 400, FiO2: 45, PEEP: 5, ITime: 1, MAP: 14, PIP: 28      LABS:                        8.7    12.38 )-----------( 283      ( 23 Oct 2022 06:00 )             29.5     10-23    146<H>  |  113<H>  |  57<H>  ----------------------------<  194<H>  4.0   |  24  |  1.25    Ca    7.8<L>      23 Oct 2022 06:00  Phos  3.8     10-23  Mg     2.6     10-23    TPro  5.7<L>  /  Alb  1.7<L>  /  TBili  0.4  /  DBili  x   /  AST  53<H>  /  ALT  81<H>  /  AlkPhos  89  10-22              WBC:  WBC Count: 12.38 K/uL (10-23 @ 06:00)  WBC Count: 9.40 K/uL (10-22 @ 06:07)  WBC Count: 10.36 K/uL (10-21 @ 06:30)  WBC Count: 15.38 K/uL (10-20 @ 06:21)  WBC Count: 11.58 K/uL (10-19 @ 20:43)      MICROBIOLOGY:  RECENT CULTURES:  10-19 .Sputum Sputum trap Stenotrophomonas maltophilia   Few Squamous epithelial cells per low power field  Few polymorphonuclear leukocytes per low power field  Few Gram positive cocci in pairs per oil power field   Moderate Mixed gram negative rods including  Moderate Stenotrophomonas maltophilia    10-18 Clean Catch Clean Catch (Midstream) Escherichia coli XXXX   >100,000 CFU/ml Escherichia coli    10-18 .Blood Blood-Peripheral XXXX XXXX   No growth to date.                    Sodium:  Sodium, Serum: 146 mmol/L (10-23 @ 06:00)  Sodium, Serum: 148 mmol/L (10-22 @ 06:07)  Sodium, Serum: 145 mmol/L (10-21 @ 06:30)  Sodium, Serum: 147 mmol/L (10-20 @ 06:21)      1.25 mg/dL 10-23 @ 06:00  1.18 mg/dL 10-22 @ 06:07  1.37 mg/dL 10-21 @ 06:30  1.57 mg/dL 10-20 @ 06:21      Hemoglobin:  Hemoglobin: 8.7 g/dL (10-23 @ 06:00)  Hemoglobin: 8.0 g/dL (10-22 @ 06:07)  Hemoglobin: 7.4 g/dL (10-21 @ 06:30)  Hemoglobin: 8.2 g/dL (10-20 @ 06:21)  Hemoglobin: 7.5 g/dL (10-19 @ 20:43)      Platelets: Platelet Count - Automated: 283 K/uL (10-23 @ 06:00)  Platelet Count - Automated: 235 K/uL (10-22 @ 06:07)  Platelet Count - Automated: 242 K/uL (10-21 @ 06:30)  Platelet Count - Automated: 226 K/uL (10-20 @ 06:21)  Platelet Count - Automated: 204 K/uL (10-19 @ 20:43)      LIVER FUNCTIONS - ( 22 Oct 2022 06:07 )  Alb: 1.7 g/dL / Pro: 5.7 g/dL / ALK PHOS: 89 U/L / ALT: 81 U/L DA / AST: 53 U/L / GGT: x                 RADIOLOGY & ADDITIONAL STUDIES:      MICROBIOLOGY:  RECENT CULTURES:  10-19 .Sputum Sputum trap Stenotrophomonas maltophilia   Few Squamous epithelial cells per low power field  Few polymorphonuclear leukocytes per low power field  Few Gram positive cocci in pairs per oil power field   Moderate Mixed gram negative rods including  Moderate Stenotrophomonas maltophilia    10-18 Clean Catch Clean Catch (Midstream) Escherichia coli XXXX   >100,000 CFU/ml Escherichia coli    10-18 .Blood Blood-Peripheral XXXX XXXX   No growth to date.

## 2022-10-23 NOTE — PROGRESS NOTE ADULT - SUBJECTIVE AND OBJECTIVE BOX
Patient is a 76y old  Female who presents with a chief complaint of Acute on Hypoxemic respiratory failure (23 Oct 2022 14:44)      BRIEF HOSPITAL COURSE: BRIEF HOSPITAL COURSE: 76F with dementia, COPD with chronic resp failure and is vent dependent, s/p Trach/PEG, hx of cardiac arrest, CHF, cor pulmonale, CVA, COVID-19 infxn, CKD, anemia, multiple admissions for respiratory distress (last admission from 6/1-6/9  and now august diagnosed with PNA with parapneumonic pleural effusion s/p course of Ertapenem who presents from [facility] for respiratory distress . Patient not verbal, demented unable to obtain any hx. Patient was admitted to the [facility] for acute on chronic hypoxic respiratory failure 2/2 to pna, sepsis, chronic anemia, RYAN, hyperK, hyperglycemia and mild hypoNa. Transfused PRBC x 2. Patient's Hgb 7.4<--8.2, CT ABD/Pel significant for moderate B/L pleural effusions with atelectasis, but did not reveal retroperitoneal hemorrhage. No additional events reported throughout the day. Repeat Hgb up-trending 8.0<--7.4, CTN downtrending, BG well controlled <180, and magnesium noted to be slightly elevated. Unable to wean FIO2 requirements on vent.    Events last 24 hours: Patient's continues on full vent support, but able to start weaning FIO2 requirements today.     ROS unable to perform 2/2    PAST MEDICAL & SURGICAL HISTORY:  Dementia of frontal lobe type      Aphasic stroke      Diabetes mellitus      Respiratory failure      Hypertension      GERD (gastroesophageal reflux disease)      Constipation      Respiratory failure      CVA (cerebral vascular accident)      HTN (hypertension)      DM (diabetes mellitus)      Advanced dementia      COVID-19 virus detected      Quadriplegia      Pneumonia      Type II diabetes mellitus      Hx of appendectomy      Gastrostomy in place      Tracheostomy in place      Tracheostomy tube present      Feeding by G-tube            Medications:  cefTRIAXone   IVPB 1000 milliGRAM(s) IV Intermittent every 24 hours  cefTRIAXone   IVPB      levoFLOXacin IVPB 750 milliGRAM(s) IV Intermittent every 48 hours    midodrine 10 milliGRAM(s) Oral every 8 hours    ALBUTerol    90 MICROgram(s) HFA Inhaler 2 Puff(s) Inhalation every 6 hours  ipratropium 17 MICROgram(s) HFA Inhaler 1 Puff(s) Inhalation every 6 hours    acetaminophen     Tablet .. 650 milliGRAM(s) Oral every 6 hours PRN  dexMEDEtomidine Infusion 0.5 MICROgram(s)/kG/Hr IV Continuous <Continuous>  LORazepam     Tablet 1 milliGRAM(s) Oral every 6 hours  LORazepam   Injectable 1 milliGRAM(s) IV Push every 4 hours PRN  melatonin 3 milliGRAM(s) Oral at bedtime PRN  ondansetron Injectable 4 milliGRAM(s) IV Push every 8 hours PRN      heparin   Injectable 5000 Unit(s) SubCutaneous every 12 hours    aluminum hydroxide/magnesium hydroxide/simethicone Suspension 30 milliLiter(s) Oral every 4 hours PRN  pantoprazole    Tablet 40 milliGRAM(s) Oral before breakfast  simethicone 80 milliGRAM(s) Chew every 12 hours      dextrose 50% Injectable 25 Gram(s) IV Push once  dextrose 50% Injectable 12.5 Gram(s) IV Push once  dextrose 50% Injectable 25 Gram(s) IV Push once  dextrose Oral Gel 15 Gram(s) Oral once PRN  glucagon  Injectable 1 milliGRAM(s) IntraMuscular once  insulin lispro (ADMELOG) corrective regimen sliding scale   SubCutaneous every 6 hours    dextrose 5%. 1000 milliLiter(s) IV Continuous <Continuous>  dextrose 5%. 1000 milliLiter(s) IV Continuous <Continuous>      cadexomer iodine 0.9% Gel 1 Application(s) Topical <User Schedule>  chlorhexidine 0.12% Liquid 15 milliLiter(s) Oral Mucosa every 12 hours  chlorhexidine 2% Cloths 1 Application(s) Topical two times a day  povidone iodine 10% Solution 1 Application(s) Topical daily        Mode: AC/ CMV (Assist Control/ Continuous Mandatory Ventilation)  RR (machine): 18  TV (machine): 400  FiO2: 40  PEEP: 5  ITime: 1  MAP: 11  PIP: 22      ICU Vital Signs Last 24 Hrs  T(C): 37.3 (23 Oct 2022 17:00), Max: 37.4 (22 Oct 2022 21:20)  T(F): 99.1 (23 Oct 2022 17:00), Max: 99.4 (23 Oct 2022 02:00)  HR: 80 (23 Oct 2022 19:00) (74 - 130)  BP: 123/81 (23 Oct 2022 19:00) (101/66 - 178/92)  BP(mean): 95 (23 Oct 2022 19:00) (76 - 111)  ABP: --  ABP(mean): --  RR: 27 (23 Oct 2022 19:00) (20 - 36)  SpO2: 98% (23 Oct 2022 19:00) (95% - 100%)    O2 Parameters below as of 23 Oct 2022 19:00  Patient On (Oxygen Delivery Method): ventilator    O2 Concentration (%): 45            I&O's Detail    22 Oct 2022 07:01  -  23 Oct 2022 07:00  --------------------------------------------------------  IN:    Free Water: 750 mL    IV PiggyBack: 750 mL    Nepro with Carb Steady: 620 mL    sodium chloride 0.9%: 320 mL  Total IN: 2440 mL    OUT:    Indwelling Catheter - Urethral (mL): 750 mL  Total OUT: 750 mL    Total NET: 1690 mL      23 Oct 2022 07:01  -  23 Oct 2022 19:56  --------------------------------------------------------  IN:    Free Water: 250 mL    Nepro with Carb Steady: 360 mL  Total IN: 610 mL    OUT:    Indwelling Catheter - Urethral (mL): 400 mL  Total OUT: 400 mL    Total NET: 210 mL            LABS:                        8.7    12.38 )-----------( 283      ( 23 Oct 2022 06:00 )             29.5     10-23    146<H>  |  113<H>  |  57<H>  ----------------------------<  194<H>  4.0   |  24  |  1.25    Ca    7.8<L>      23 Oct 2022 06:00  Phos  3.8     10-23  Mg     2.6     10-23    TPro  5.7<L>  /  Alb  1.7<L>  /  TBili  0.4  /  DBili  x   /  AST  53<H>  /  ALT  81<H>  /  AlkPhos  89  10-22          CAPILLARY BLOOD GLUCOSE      POCT Blood Glucose.: 197 mg/dL (23 Oct 2022 17:31)        CULTURES:  Culture Results:   Moderate Mixed gram negative rods including  Moderate Stenotrophomonas maltophilia (10-19 @ 00:58)  Culture Results:   >100,000 CFU/ml Escherichia coli (10-18 @ 12:27)  Culture Results:   No Growth Final (10-18 @ 12:00)  Culture Results:   No Growth Final (10-18 @ 12:00)  Rapid RVP Result: NotDetec (10-18 @ 11:30)      Physical Examination:    General: No acute distress.      HEENT: Pupils equal, reactive to light.  Symmetric.    PULM: Clear to auscultation bilaterally, no significant sputum production    NECK: Supple, no lymphadenopathy, trachea midline    CVS: Regular rate and rhythm, no murmurs, rubs, or gallops    ABD: Soft, nondistended, nontender, normoactive bowel sounds, no masses    EXT: No edema, nontender    SKIN: Warm and well perfused, no rashes noted.    NEURO: Alert, oriented, interactive, nonfocal    DEVICES:     RADIOLOGY: ***    CRITICAL CARE TIME SPENT: ***   Patient is a 76y old  Female who presents with a chief complaint of Acute on Hypoxemic respiratory failure (23 Oct 2022 14:44)      BRIEF HOSPITAL COURSE: BRIEF HOSPITAL COURSE: 76F with dementia, COPD with chronic resp failure and is vent dependent, s/p Trach/PEG, hx of cardiac arrest, CHF, cor pulmonale, CVA, COVID-19 infxn, CKD, anemia, multiple admissions for respiratory distress (last admission from 6/1-6/9  and now august diagnosed with PNA with parapneumonic pleural effusion s/p course of Ertapenem who presents from [facility] for respiratory distress . Patient not verbal, demented unable to obtain any hx. Patient was admitted to the SPCU for acute on chronic hypoxic respiratory failure 2/2 to pna, sepsis, chronic anemia, RYAN, hyperK, hyperglycemia and mild hypoNa. Transfused PRBC x 2. Patient's Hgb 7.4<--8.2, CT ABD/Pel significant for moderate B/L pleural effusions with atelectasis, but did not reveal retroperitoneal hemorrhage. No additional events reported throughout the day. Repeat Hgb up-trending 8.0<--7.4, CTN downtrending, BG well controlled <180, and magnesium noted to be slightly elevated. Unable to wean FIO2 requirements on vent.    Events last 24 hours: Patient's continues on full vent support, but able to start weaning FIO2 requirements today. Leukocytosis, Hgb stable, and CTN up-trending.    ROS unable to perform 2/2 patient's current state    PAST MEDICAL & SURGICAL HISTORY:  Dementia of frontal lobe type      Aphasic stroke      Diabetes mellitus      Respiratory failure      Hypertension      GERD (gastroesophageal reflux disease)      Constipation      Respiratory failure      CVA (cerebral vascular accident)      HTN (hypertension)      DM (diabetes mellitus)      Advanced dementia      COVID-19 virus detected      Quadriplegia      Pneumonia      Type II diabetes mellitus      Hx of appendectomy      Gastrostomy in place      Tracheostomy in place      Tracheostomy tube present      Feeding by G-tube            Medications:  cefTRIAXone   IVPB 1000 milliGRAM(s) IV Intermittent every 24 hours  cefTRIAXone   IVPB      levoFLOXacin IVPB 750 milliGRAM(s) IV Intermittent every 48 hours    midodrine 10 milliGRAM(s) Oral every 8 hours    ALBUTerol    90 MICROgram(s) HFA Inhaler 2 Puff(s) Inhalation every 6 hours  ipratropium 17 MICROgram(s) HFA Inhaler 1 Puff(s) Inhalation every 6 hours    acetaminophen     Tablet .. 650 milliGRAM(s) Oral every 6 hours PRN  dexMEDEtomidine Infusion 0.5 MICROgram(s)/kG/Hr IV Continuous <Continuous>  LORazepam     Tablet 1 milliGRAM(s) Oral every 6 hours  LORazepam   Injectable 1 milliGRAM(s) IV Push every 4 hours PRN  melatonin 3 milliGRAM(s) Oral at bedtime PRN  ondansetron Injectable 4 milliGRAM(s) IV Push every 8 hours PRN      heparin   Injectable 5000 Unit(s) SubCutaneous every 12 hours    aluminum hydroxide/magnesium hydroxide/simethicone Suspension 30 milliLiter(s) Oral every 4 hours PRN  pantoprazole    Tablet 40 milliGRAM(s) Oral before breakfast  simethicone 80 milliGRAM(s) Chew every 12 hours      dextrose 50% Injectable 25 Gram(s) IV Push once  dextrose 50% Injectable 12.5 Gram(s) IV Push once  dextrose 50% Injectable 25 Gram(s) IV Push once  dextrose Oral Gel 15 Gram(s) Oral once PRN  glucagon  Injectable 1 milliGRAM(s) IntraMuscular once  insulin lispro (ADMELOG) corrective regimen sliding scale   SubCutaneous every 6 hours    dextrose 5%. 1000 milliLiter(s) IV Continuous <Continuous>  dextrose 5%. 1000 milliLiter(s) IV Continuous <Continuous>      cadexomer iodine 0.9% Gel 1 Application(s) Topical <User Schedule>  chlorhexidine 0.12% Liquid 15 milliLiter(s) Oral Mucosa every 12 hours  chlorhexidine 2% Cloths 1 Application(s) Topical two times a day  povidone iodine 10% Solution 1 Application(s) Topical daily        Mode: AC/ CMV (Assist Control/ Continuous Mandatory Ventilation)  RR (machine): 18  TV (machine): 400  FiO2: 40  PEEP: 5  ITime: 1  MAP: 11  PIP: 22      ICU Vital Signs Last 24 Hrs  T(C): 37.3 (23 Oct 2022 17:00), Max: 37.4 (22 Oct 2022 21:20)  T(F): 99.1 (23 Oct 2022 17:00), Max: 99.4 (23 Oct 2022 02:00)  HR: 80 (23 Oct 2022 19:00) (74 - 130)  BP: 123/81 (23 Oct 2022 19:00) (101/66 - 178/92)  BP(mean): 95 (23 Oct 2022 19:00) (76 - 111)  ABP: --  ABP(mean): --  RR: 27 (23 Oct 2022 19:00) (20 - 36)  SpO2: 98% (23 Oct 2022 19:00) (95% - 100%)    O2 Parameters below as of 23 Oct 2022 19:00  Patient On (Oxygen Delivery Method): ventilator    O2 Concentration (%): 45            I&O's Detail    22 Oct 2022 07:01  -  23 Oct 2022 07:00  --------------------------------------------------------  IN:    Free Water: 750 mL    IV PiggyBack: 750 mL    Nepro with Carb Steady: 620 mL    sodium chloride 0.9%: 320 mL  Total IN: 2440 mL    OUT:    Indwelling Catheter - Urethral (mL): 750 mL  Total OUT: 750 mL    Total NET: 1690 mL      23 Oct 2022 07:01  -  23 Oct 2022 19:56  --------------------------------------------------------  IN:    Free Water: 250 mL    Nepro with Carb Steady: 360 mL  Total IN: 610 mL    OUT:    Indwelling Catheter - Urethral (mL): 400 mL  Total OUT: 400 mL    Total NET: 210 mL            LABS:                        8.7    12.38 )-----------( 283      ( 23 Oct 2022 06:00 )             29.5     10-23    146<H>  |  113<H>  |  57<H>  ----------------------------<  194<H>  4.0   |  24  |  1.25    Ca    7.8<L>      23 Oct 2022 06:00  Phos  3.8     10-23  Mg     2.6     10-23    TPro  5.7<L>  /  Alb  1.7<L>  /  TBili  0.4  /  DBili  x   /  AST  53<H>  /  ALT  81<H>  /  AlkPhos  89  10-22          CAPILLARY BLOOD GLUCOSE      POCT Blood Glucose.: 197 mg/dL (23 Oct 2022 17:31)        CULTURES:  Culture Results:   Moderate Mixed gram negative rods including  Moderate Stenotrophomonas maltophilia (10-19 @ 00:58)  Culture Results:   >100,000 CFU/ml Escherichia coli (10-18 @ 12:27)  Culture Results:   No Growth Final (10-18 @ 12:00)  Culture Results:   No Growth Final (10-18 @ 12:00)  Rapid RVP Result: NotDetec (10-18 @ 11:30)      Physical Examination:    General: No acute distress. +Trach/PEG    HEENT: Atraumatic, Normocephalic, pupils equal, reactive to light.  Symmetric.    PULM: Diminished / coarse breath sounds B/L    NECK: Supple, +Trach, RT IJ CVC    CVS: Regular rate and rhythm, no murmurs, rubs, or gallops    ABD: Soft, nondistended, nontender, no rebound or guarding, +PEG, site C/D/I     EXT: No edema, nontender    SKIN: Warm and well perfused, left gluteal ulcer    NEURO: Demented / non-verbal    DEVICES:     Critical Care time: 35 mins assessing presenting problems of acute illness that poses high probability of life threatening deterioration or end organ damage/dysfunction.  Medical decision making including initiating plan of care, reviewing data, reviewing radiology, direct patient bedside evaluation and interpretation of vital signs, any necessary ventilator management, discussion with multidisciplinary team, discussing goals of care with patient/family, all non inclusive of procedures Patient is a 76y old  Female who presents with a chief complaint of Acute on Hypoxemic respiratory failure (23 Oct 2022 14:44)      BRIEF HOSPITAL COURSE: BRIEF HOSPITAL COURSE: 76F with dementia, COPD with chronic resp failure and is vent dependent, s/p Trach/PEG, hx of cardiac arrest, CHF, cor pulmonale, CVA, COVID-19 infxn, CKD, anemia, multiple admissions for respiratory distress (last admission from 6/1-6/9  and now august diagnosed with PNA with parapneumonic pleural effusion s/p course of Ertapenem who presents from [facility] for respiratory distress . Patient not verbal, demented unable to obtain any hx. Patient was admitted to the SPCU for acute on chronic hypoxic respiratory failure 2/2 to pna, sepsis, chronic anemia, RYAN, hyperK, hyperglycemia and mild hypoNa. Transfused PRBC x 2. Patient's Hgb 7.4<--8.2, CT ABD/Pel significant for moderate B/L pleural effusions with atelectasis, but did not reveal retroperitoneal hemorrhage. No additional events reported throughout the day. Repeat Hgb up-trending 8.0<--7.4, CTN downtrending, BG well controlled <180, and magnesium noted to be slightly elevated. Unable to wean FIO2 requirements on vent.    Events last 24 hours: Patient's continues on full vent support, unable to wean FIO2, leukocytosis, Hgb stable, and CTN up-trending.    ROS unable to perform 2/2 patient's current state    PAST MEDICAL & SURGICAL HISTORY:  Dementia of frontal lobe type      Aphasic stroke      Diabetes mellitus      Respiratory failure      Hypertension      GERD (gastroesophageal reflux disease)      Constipation      Respiratory failure      CVA (cerebral vascular accident)      HTN (hypertension)      DM (diabetes mellitus)      Advanced dementia      COVID-19 virus detected      Quadriplegia      Pneumonia      Type II diabetes mellitus      Hx of appendectomy      Gastrostomy in place      Tracheostomy in place      Tracheostomy tube present      Feeding by G-tube            Medications:  cefTRIAXone   IVPB 1000 milliGRAM(s) IV Intermittent every 24 hours  cefTRIAXone   IVPB      levoFLOXacin IVPB 750 milliGRAM(s) IV Intermittent every 48 hours    midodrine 10 milliGRAM(s) Oral every 8 hours    ALBUTerol    90 MICROgram(s) HFA Inhaler 2 Puff(s) Inhalation every 6 hours  ipratropium 17 MICROgram(s) HFA Inhaler 1 Puff(s) Inhalation every 6 hours    acetaminophen     Tablet .. 650 milliGRAM(s) Oral every 6 hours PRN  dexMEDEtomidine Infusion 0.5 MICROgram(s)/kG/Hr IV Continuous <Continuous>  LORazepam     Tablet 1 milliGRAM(s) Oral every 6 hours  LORazepam   Injectable 1 milliGRAM(s) IV Push every 4 hours PRN  melatonin 3 milliGRAM(s) Oral at bedtime PRN  ondansetron Injectable 4 milliGRAM(s) IV Push every 8 hours PRN      heparin   Injectable 5000 Unit(s) SubCutaneous every 12 hours    aluminum hydroxide/magnesium hydroxide/simethicone Suspension 30 milliLiter(s) Oral every 4 hours PRN  pantoprazole    Tablet 40 milliGRAM(s) Oral before breakfast  simethicone 80 milliGRAM(s) Chew every 12 hours      dextrose 50% Injectable 25 Gram(s) IV Push once  dextrose 50% Injectable 12.5 Gram(s) IV Push once  dextrose 50% Injectable 25 Gram(s) IV Push once  dextrose Oral Gel 15 Gram(s) Oral once PRN  glucagon  Injectable 1 milliGRAM(s) IntraMuscular once  insulin lispro (ADMELOG) corrective regimen sliding scale   SubCutaneous every 6 hours    dextrose 5%. 1000 milliLiter(s) IV Continuous <Continuous>  dextrose 5%. 1000 milliLiter(s) IV Continuous <Continuous>      cadexomer iodine 0.9% Gel 1 Application(s) Topical <User Schedule>  chlorhexidine 0.12% Liquid 15 milliLiter(s) Oral Mucosa every 12 hours  chlorhexidine 2% Cloths 1 Application(s) Topical two times a day  povidone iodine 10% Solution 1 Application(s) Topical daily        Mode: AC/ CMV (Assist Control/ Continuous Mandatory Ventilation)  RR (machine): 18  TV (machine): 400  FiO2: 40  PEEP: 5  ITime: 1  MAP: 11  PIP: 22      ICU Vital Signs Last 24 Hrs  T(C): 37.3 (23 Oct 2022 17:00), Max: 37.4 (22 Oct 2022 21:20)  T(F): 99.1 (23 Oct 2022 17:00), Max: 99.4 (23 Oct 2022 02:00)  HR: 80 (23 Oct 2022 19:00) (74 - 130)  BP: 123/81 (23 Oct 2022 19:00) (101/66 - 178/92)  BP(mean): 95 (23 Oct 2022 19:00) (76 - 111)  ABP: --  ABP(mean): --  RR: 27 (23 Oct 2022 19:00) (20 - 36)  SpO2: 98% (23 Oct 2022 19:00) (95% - 100%)    O2 Parameters below as of 23 Oct 2022 19:00  Patient On (Oxygen Delivery Method): ventilator    O2 Concentration (%): 45            I&O's Detail    22 Oct 2022 07:01  -  23 Oct 2022 07:00  --------------------------------------------------------  IN:    Free Water: 750 mL    IV PiggyBack: 750 mL    Nepro with Carb Steady: 620 mL    sodium chloride 0.9%: 320 mL  Total IN: 2440 mL    OUT:    Indwelling Catheter - Urethral (mL): 750 mL  Total OUT: 750 mL    Total NET: 1690 mL      23 Oct 2022 07:01  -  23 Oct 2022 19:56  --------------------------------------------------------  IN:    Free Water: 250 mL    Nepro with Carb Steady: 360 mL  Total IN: 610 mL    OUT:    Indwelling Catheter - Urethral (mL): 400 mL  Total OUT: 400 mL    Total NET: 210 mL            LABS:                        8.7    12.38 )-----------( 283      ( 23 Oct 2022 06:00 )             29.5     10-23    146<H>  |  113<H>  |  57<H>  ----------------------------<  194<H>  4.0   |  24  |  1.25    Ca    7.8<L>      23 Oct 2022 06:00  Phos  3.8     10-23  Mg     2.6     10-23    TPro  5.7<L>  /  Alb  1.7<L>  /  TBili  0.4  /  DBili  x   /  AST  53<H>  /  ALT  81<H>  /  AlkPhos  89  10-22          CAPILLARY BLOOD GLUCOSE      POCT Blood Glucose.: 197 mg/dL (23 Oct 2022 17:31)        CULTURES:  Culture Results:   Moderate Mixed gram negative rods including  Moderate Stenotrophomonas maltophilia (10-19 @ 00:58)  Culture Results:   >100,000 CFU/ml Escherichia coli (10-18 @ 12:27)  Culture Results:   No Growth Final (10-18 @ 12:00)  Culture Results:   No Growth Final (10-18 @ 12:00)  Rapid RVP Result: NotDetec (10-18 @ 11:30)      Physical Examination:    General: No acute distress. +Trach/PEG    HEENT: Atraumatic, Normocephalic, pupils equal, reactive to light.  Symmetric.    PULM: Diminished / coarse breath sounds B/L    NECK: Supple, +Trach, RT IJ CVC    CVS: Regular rate and rhythm, no murmurs, rubs, or gallops    ABD: Soft, nondistended, nontender, no rebound or guarding, +PEG, site C/D/I     EXT: No edema, nontender    SKIN: Warm and well perfused, left gluteal ulcer    NEURO: Demented / non-verbal    DEVICES:     Critical Care time: 35 mins assessing presenting problems of acute illness that poses high probability of life threatening deterioration or end organ damage/dysfunction.  Medical decision making including initiating plan of care, reviewing data, reviewing radiology, direct patient bedside evaluation and interpretation of vital signs, any necessary ventilator management, discussion with multidisciplinary team, discussing goals of care with patient/family, all non inclusive of procedures

## 2022-10-23 NOTE — CHART NOTE - NSCHARTNOTEFT_GEN_A_CORE
Nutrition Follow Up Note  Patient seen for: Nutrition follow up     Chart reviewed, events noted: As per chart "The pt is a 75yo F with PMH of dementia, COPD with chronic resp failure and is vent dependent, s/p PEG, hx of cardiac arrest, diastolic CHF, cor pulmonale, hx of CVA, COVID-19 infxn, CKD, anemia, multiple admissions for respiratory distress (recent admission from 6/1-6/9 diagnosed with PNA with parapneumonic pleural effusion s/p thoracentesis and Avycaz) who presents from [facility] for respiratory distress again a/w sepsis and respiratory failure due to suspected recurrent gram-negative PNA."    10/23  Pt seen at bedside for nutrition follow up. Pt previously NPO from (10/18-10/22)        Source: [] Patient       [x] EMR        [] RN        [] Family at bedside       [] Other:    -If unable to interview patient: [] Trach/Vent/BiPAP  [x] Disoriented/confused/inappropriate to interview    Diet Order:   Diet, NPO with Tube Feed:   Tube Feeding Modality: Gastrostomy  Nepro with Carb Steady  Total Volume for 24 Hours (mL): 800  Continuous  Starting Tube Feed Rate {mL per Hour}: 20  Increase Tube Feed Rate by (mL): 10     Every 4 hours  Until Goal Tube Feed Rate (mL per Hour): 40  Tube Feed Duration (in Hours): 20  Tube Feed Start Time: 16:00  Tube Feed Stop Time: 12:00  Free Water Flush  Bolus   Total Volume per Flush (mL): 250   Frequency: Every 6 Hours  Lucas(7 Gm Arginine/7 Gm Glut/1.2 Gm HMB     Qty per Day:  1 (10-19-22)    - Is current order appropriate/adequate? [x] Yes  []  No:     Weights:   Daily Weight in lbs: 124.3 lbs (10/23), 118.8 lbs (10/22), 116.1 lbs (10/21), 115.3 lbs (10/20), 115.5 lbs (10/19). Will continue to monitor weights and trend as available.     Nutritionally Pertinent MEDICATIONS  (STANDING):  cefTRIAXone   IVPB  cefTRIAXone   IVPB  dextrose 5%.  dextrose 5%.  dextrose 50% Injectable  dextrose 50% Injectable  dextrose 50% Injectable  glucagon  Injectable  insulin lispro (ADMELOG) corrective regimen sliding scale  levoFLOXacin IVPB  midodrine  pantoprazole    Tablet  simethicone    Pertinent Labs: 10-23 @ 06:00: Na 146<H>, BUN 57<H>, Cr 1.25, <H>, K+ 4.0, Phos 3.8, Mg 2.6, Alk Phos --, ALT/SGPT --, AST/SGOT --, HbA1c --    A1C with Estimated Average Glucose Result: 8.5 % (10-19-22 @ 09:05)  A1C with Estimated Average Glucose Result: 7.3 % (08-19-22 @ 06:36)    Finger Sticks:  POCT Blood Glucose.: 190 mg/dL (10-23 @ 11:34)  POCT Blood Glucose.: 172 mg/dL (10-23 @ 05:09)  POCT Blood Glucose.: 246 mg/dL (10-22 @ 23:14)  POCT Blood Glucose.: 174 mg/dL (10-22 @ 17:14)    Skin per nursing documentation: Sacral Stage 2, Left Glutea Fold Stage IV  Edema: No edema noted, per nursing flow sheets     Estimated Needs:   [x] no change since previous assessment  [] recalculated:     Previous Nutrition Diagnosis: Increased Nutrient Needs and Altered Nutrition Related Lab Values  Nutrition Diagnosis is: [x] ongoing  [] resolved [] not applicable     New Nutrition Diagnosis: [x] Not applicable    Nutrition Care Plan:  [x] In Progress  [] Achieved  [] Not applicable    Nutrition Interventions:     Education Provided:       [] Yes:  [x] No:        Recommendations:         [x] Recommend changing TF order               [] Add oral nutrition supplement:     [] Discontinue current diet order. Recommend:      [] Add micronutrient supplementation:      [] Continue current micronutrient supplementation:      [] Other:     Monitoring and Evaluation:   Continue to monitor nutritional intake, tolerance to diet prescription, weights, labs, skin integrity    RD remains available upon request and will follow up per protocol Nutrition Follow Up Note  Patient seen for: Nutrition follow up     Chart reviewed, events noted: As per chart "The pt is a 75yo F with PMH of dementia, COPD with chronic resp failure and is vent dependent, s/p PEG, hx of cardiac arrest, diastolic CHF, cor pulmonale, hx of CVA, COVID-19 infxn, CKD, anemia, multiple admissions for respiratory distress (recent admission from 6/1-6/9 diagnosed with PNA with parapneumonic pleural effusion s/p thoracentesis and Avycaz) who presents from [facility] for respiratory distress again a/w sepsis and respiratory failure due to suspected recurrent gram-negative PNA."    10/23  Pt seen at bedside for nutrition follow up, vented with trachea on Precedex. Pt previously NPO from (10/18-10/22), TF initiated on (10/22), presently on Nepro @40ml/hr x 20 hrs provides 800ml of formula, 1440 kcal, 65 gm protein and 582 ml free water + Lucas provides 90kcal and 14 gm AA. TF placed on hold today secondary to leak around site, GI consulted and readjusted PEG. Per RN tube feed re-initiated,  tolerating current regimen with no acute GI distress, last BM on (10/23) per flow sheets.  Renal indices (K+, Mg, Phos) currently with in normals limits; recommend changing TF formula to Glucerna 1.5 -- initiate at 20 ml/hr and advance 10 ml/hr q4 hrs until goal rate of 50 ml/hr (x20 hrs) to provide 1000 ml, 1500 kcal (26kcal/kg), 83g protein (1.4 gm of protein/kg), 759 ml free water, based on IBW of 56.6 kg. Additional free water per MD discretion. Continue to monitor Renal indices (K+, Mg, Phos) for need for Nepro formula. RD to remain available to adjust EN formulary, volume/rate as needed and will follow up to continue to monitor pt's nutrition status.     Source: [] Patient       [x] EMR        [] RN        [] Family at bedside       [] Other:    -If unable to interview patient: [] Trach/Vent/BiPAP  [x] Disoriented/confused/inappropriate to interview    Diet Order:   Diet, NPO with Tube Feed:   Tube Feeding Modality: Gastrostomy  Nepro with Carb Steady  Total Volume for 24 Hours (mL): 800  Continuous  Starting Tube Feed Rate {mL per Hour}: 20  Increase Tube Feed Rate by (mL): 10     Every 4 hours  Until Goal Tube Feed Rate (mL per Hour): 40  Tube Feed Duration (in Hours): 20  Tube Feed Start Time: 16:00  Tube Feed Stop Time: 12:00  Free Water Flush  Bolus   Total Volume per Flush (mL): 250   Frequency: Every 6 Hours  Lucas(7 Gm Arginine/7 Gm Glut/1.2 Gm HMB     Qty per Day:  1 (10-19-22)    - Is current order appropriate/adequate? [] Yes  [x]  No: See recommendations below:     Weights:   Daily Weight in lbs: 124.3 lbs (10/23), 118.8 lbs (10/22), 116.1 lbs (10/21), 115.3 lbs (10/20), 115.5 lbs (10/19). Will continue to monitor weights and trend as available.     Nutritionally Pertinent MEDICATIONS  (STANDING):  cefTRIAXone   IVPB  cefTRIAXone   IVPB  dextrose 5%.  dextrose 5%.  dextrose 50% Injectable  dextrose 50% Injectable  dextrose 50% Injectable  glucagon  Injectable  insulin lispro (ADMELOG) corrective regimen sliding scale  levoFLOXacin IVPB  midodrine  pantoprazole    Tablet  simethicone    Pertinent Labs: 10-23 @ 06:00: Na 146<H>, BUN 57<H>, Cr 1.25, <H>, K+ 4.0, Phos 3.8, Mg 2.6, Alk Phos --, ALT/SGPT --, AST/SGOT --, HbA1c --    A1C with Estimated Average Glucose Result: 8.5 % (10-19-22 @ 09:05)  A1C with Estimated Average Glucose Result: 7.3 % (08-19-22 @ 06:36)    Finger Sticks:  POCT Blood Glucose.: 190 mg/dL (10-23 @ 11:34)  POCT Blood Glucose.: 172 mg/dL (10-23 @ 05:09)  POCT Blood Glucose.: 246 mg/dL (10-22 @ 23:14)  POCT Blood Glucose.: 174 mg/dL (10-22 @ 17:14)    Skin per nursing documentation: Sacral Stage 2, Left Glutea Fold Stage IV  Edema: No edema noted, per nursing flow sheets     Estimated Needs:   [x] no change since previous assessment  [] recalculated:     Previous Nutrition Diagnosis: Increased Nutrient Needs and Altered Nutrition Related Lab Values  Nutrition Diagnosis is: [x] ongoing  [] resolved [] not applicable     New Nutrition Diagnosis: [x] Not applicable    Nutrition Care Plan:  [x] In Progress  [] Achieved  [] Not applicable    Nutrition Interventions:     Education Provided:       [] Yes:  [x] No:        Recommendations:         [x] Recommend changing TF formula to Glucerna 1.5 -- initiate at 20 ml/hr and advance 10 ml/hr q4 hrs until goal rate of 50 ml/hr (x20 hrs) to provide 1000 ml, 1500 kcal (26kcal/kg), 83g protein (1.4 gm of protein/kg), 759 ml free water, based on IBW of 56.6 kg. Additional free water per MD discretion.      [x] Continue to monitor Renal indices (K+, Mg, Phos) for need of Nepro formula      [x] RD to remain available    Monitoring and Evaluation:   Continue to monitor nutritional intake, tolerance to diet prescription, weights, labs, skin integrity    RD remains available upon request and will follow up per protocol

## 2022-10-23 NOTE — CONSULT NOTE ADULT - SUBJECTIVE AND OBJECTIVE BOX
Chief Complaint:  Patient is a 76y old  Female who presents with a chief complaint of Acute on Hypoxemic respiratory failure   76F with dementia, COPD with chronic resp failure and is vent dependent, s/p PEG, hx of cardiac arrest, CHF, cor pulmonale, CVA, COVID-19 infxn, CKD, anemia, multiple admissions for respiratory distress (last admission from -  and now august diagnosed with PNA with parapneumonic pleural effusion s/p course of ertapenam, who presents from [facility] for respiratory distress . Patient not verbal, demented unable to obtain any hx    ER course:  Vitals stable afebrile  Imaging:  labs noted:hb 7.4., wbc 13.,  Bun 122, Cr 2.4  Allergies:  codeine (Hives)      Medications:  acetaminophen     Tablet .. 650 milliGRAM(s) Oral every 6 hours PRN  ALBUTerol    90 MICROgram(s) HFA Inhaler 2 Puff(s) Inhalation every 6 hours  aluminum hydroxide/magnesium hydroxide/simethicone Suspension 30 milliLiter(s) Oral every 4 hours PRN  cadexomer iodine 0.9% Gel 1 Application(s) Topical <User Schedule>  cefTRIAXone   IVPB 1000 milliGRAM(s) IV Intermittent every 24 hours  cefTRIAXone   IVPB      chlorhexidine 0.12% Liquid 15 milliLiter(s) Oral Mucosa every 12 hours  chlorhexidine 2% Cloths 1 Application(s) Topical two times a day  dexMEDEtomidine Infusion 0.5 MICROgram(s)/kG/Hr IV Continuous <Continuous>  dextrose 5%. 1000 milliLiter(s) IV Continuous <Continuous>  dextrose 5%. 1000 milliLiter(s) IV Continuous <Continuous>  dextrose 50% Injectable 25 Gram(s) IV Push once  dextrose 50% Injectable 12.5 Gram(s) IV Push once  dextrose 50% Injectable 25 Gram(s) IV Push once  dextrose Oral Gel 15 Gram(s) Oral once PRN  glucagon  Injectable 1 milliGRAM(s) IntraMuscular once  heparin   Injectable 5000 Unit(s) SubCutaneous every 12 hours  insulin lispro (ADMELOG) corrective regimen sliding scale   SubCutaneous every 6 hours  ipratropium 17 MICROgram(s) HFA Inhaler 1 Puff(s) Inhalation every 6 hours  levoFLOXacin IVPB 750 milliGRAM(s) IV Intermittent every 48 hours  LORazepam     Tablet 1 milliGRAM(s) Oral every 6 hours  LORazepam   Injectable 1 milliGRAM(s) IV Push every 4 hours PRN  melatonin 3 milliGRAM(s) Oral at bedtime PRN  midodrine 10 milliGRAM(s) Oral every 8 hours  ondansetron Injectable 4 milliGRAM(s) IV Push every 8 hours PRN  pantoprazole    Tablet 40 milliGRAM(s) Oral before breakfast  povidone iodine 10% Solution 1 Application(s) Topical daily  simethicone 80 milliGRAM(s) Chew every 12 hours      PMHX/PSHX:  Dementia of frontal lobe type    Aphasic stroke    Diabetes mellitus    Respiratory failure    Hypertension    GERD (gastroesophageal reflux disease)    Constipation    Respiratory failure    CVA (cerebral vascular accident)    HTN (hypertension)    DM (diabetes mellitus)    Advanced dementia    COVID-19 virus detected    Quadriplegia    Pneumonia    Type II diabetes mellitus    Hx of appendectomy    Gastrostomy in place    Tracheostomy in place    Tracheostomy tube present    Feeding by G-tube        Family history:  No pertinent family history in first degree relatives    No pertinent family history in first degree relatives    No pertinent family history in first degree relatives    No pertinent family history in first degree relatives        Social History:   no etoh no cigs no ivda  snf resident    ROS:     General:  No wt loss, fevers, chills, night sweats, fatigue,   Eyes:  Good vision, no reported pain  ENT:  No sore throat, pain, runny nose, dysphagia  CV:  No pain, palpitations, hypo/hypertension  Resp:  No dyspnea, cough, tachypnea, wheezing  GI:  No pain, No nausea, No vomiting, No diarrhea, No constipation, No weight loss, No fever, No pruritis, No rectal bleeding, No tarry stools, No dysphagia,  :  No pain, bleeding, incontinence, nocturia  Muscle:  No pain, weakness  Neuro:  No weakness, tingling, memory problems  Psych:  No fatigue, insomnia, mood problems, depression  Endocrine:  No polyuria, polydipsia, cold/heat intolerance  Heme:  No petechiae, ecchymosis, easy bruisability  Skin:  No rash, tattoos, scars, edema      PHYSICAL EXAM:   Vital Signs:  Vital Signs Last 24 Hrs  T(C): 36.8 (23 Oct 2022 07:00), Max: 37.4 (22 Oct 2022 21:20)  T(F): 98.3 (23 Oct 2022 07:00), Max: 99.4 (23 Oct 2022 02:00)  HR: 80 (23 Oct 2022 12:00) (68 - 130)  BP: 110/61 (23 Oct 2022 12:00) (101/66 - 178/92)  BP(mean): 76 (23 Oct 2022 12:00) (76 - 111)  RR: 24 (23 Oct 2022 12:00) (21 - 36)  SpO2: 97% (23 Oct 2022 12:00) (95% - 100%)    Parameters below as of 23 Oct 2022 12:00  Patient On (Oxygen Delivery Method): ventilator    O2 Concentration (%): 45  Daily     Daily Weight in k.4 (23 Oct 2022 04:02)    GENERAL:  Appears stated age, well-groomed, well-nourished, no distress  HEENT:  NC/AT,  conjunctivae clear and pink, no thyromegaly, nodules, adenopathy, no JVD, sclera -anicteric  CHEST:  Full & symmetric excursion, no increased effort, breath sounds clear  HEART:  Regular rhythm, S1, S2, no murmur/rub/S3/S4, no abdominal bruit, no edema  ABDOMEN:  Soft, non-tender, non-distended, normoactive bowel sounds,  no masses ,no hepato-splenomegaly, no signs of chronic liver disease  EXTEREMITIES:  no cyanosis,clubbing or edema  SKIN:  No rash/erythema/ecchymoses/petechiae/wounds/abscess/warm/dry  NEURO:  Alert, oriented, no asterixis, no tremor, no encephalopathy    LABS:                        8.7    12.38 )-----------( 283      ( 23 Oct 2022 06:00 )             29.5     10-23    146<H>  |  113<H>  |  57<H>  ----------------------------<  194<H>  4.0   |  24  |  1.25    Ca    7.8<L>      23 Oct 2022 06:00  Phos  3.8     10-23  Mg     2.6     10-23    TPro  5.7<L>  /  Alb  1.7<L>  /  TBili  0.4  /  DBili  x   /  AST  53<H>  /  ALT  81<H>  /  AlkPhos  89  10-22    LIVER FUNCTIONS - ( 22 Oct 2022 06:07 )  Alb: 1.7 g/dL / Pro: 5.7 g/dL / ALK PHOS: 89 U/L / ALT: 81 U/L DA / AST: 53 U/L / GGT: x                   Imaging:

## 2022-10-23 NOTE — PROGRESS NOTE ADULT - SUBJECTIVE AND OBJECTIVE BOX
Covering [facility] DIVISION of INFECTIOUS DISEASE  MOIZ Chun, SOLANGE Higgins G. Casimir PARASHARAMI BREA is a 76yFemale , patient examined and chart reviewed.      INTERVAL HPI/ OVERNIGHT EVENTS:  Afebrile Vegetative state.  Chronic vent. No events.    Past Medical History--  PAST MEDICAL & SURGICAL HISTORY:  Dementia of frontal lobe type  Aphasic stroke  Diabetes mellitus  Respiratory failure  Hypertension  GERD (gastroesophageal reflux disease)  Constipation  Respiratory failure  CVA (cerebral vascular accident)  HTN (hypertension)  DM (diabetes mellitus)  Advanced dementia  COVID-19 virus detected  Quadriplegia  Pneumonia  Type II diabetes mellitus  Hx of appendectomy  Gastrostomy in place  Tracheostomy in place        For details regarding the patient's social history, family history, and other miscellaneous elements, please refer the initial infectious diseases consultation and/or the admitting history and physical examination for this admission.      ROS:  Unable to obtain due to : Pt's condition    ALLERGIES  codeine (Hives)      Current inpatient medications :    ANTIBIOTICS/RELEVANT:  cefTRIAXone   IVPB 1000 milliGRAM(s) IV Intermittent every 24 hours  cefTRIAXone   IVPB      levoFLOXacin IVPB 750 milliGRAM(s) IV Intermittent every 48 hours    MEDICATIONS  (STANDING):  ALBUTerol    90 MICROgram(s) HFA Inhaler 2 Puff(s) Inhalation every 6 hours  cadexomer iodine 0.9% Gel 1 Application(s) Topical <User Schedule>  chlorhexidine 0.12% Liquid 15 milliLiter(s) Oral Mucosa every 12 hours  chlorhexidine 2% Cloths 1 Application(s) Topical two times a day  dexMEDEtomidine Infusion 0.5 MICROgram(s)/kG/Hr (6.14 mL/Hr) IV Continuous <Continuous>  dextrose 5%. 1000 milliLiter(s) (100 mL/Hr) IV Continuous <Continuous>  dextrose 5%. 1000 milliLiter(s) (50 mL/Hr) IV Continuous <Continuous>  dextrose 50% Injectable 25 Gram(s) IV Push once  dextrose 50% Injectable 12.5 Gram(s) IV Push once  dextrose 50% Injectable 25 Gram(s) IV Push once  glucagon  Injectable 1 milliGRAM(s) IntraMuscular once  heparin   Injectable 5000 Unit(s) SubCutaneous every 12 hours  insulin lispro (ADMELOG) corrective regimen sliding scale   SubCutaneous every 6 hours  ipratropium 17 MICROgram(s) HFA Inhaler 1 Puff(s) Inhalation every 6 hours  LORazepam     Tablet 1 milliGRAM(s) Oral every 6 hours  midodrine 10 milliGRAM(s) Oral every 8 hours  pantoprazole    Tablet 40 milliGRAM(s) Oral before breakfast  povidone iodine 10% Solution 1 Application(s) Topical daily  simethicone 80 milliGRAM(s) Chew every 12 hours    MEDICATIONS  (PRN):  acetaminophen     Tablet .. 650 milliGRAM(s) Oral every 6 hours PRN Temp greater or equal to 38C (100.4F), Mild Pain (1 - 3)  aluminum hydroxide/magnesium hydroxide/simethicone Suspension 30 milliLiter(s) Oral every 4 hours PRN Dyspepsia  dextrose Oral Gel 15 Gram(s) Oral once PRN Blood Glucose LESS THAN 70 milliGRAM(s)/deciliter  LORazepam   Injectable 1 milliGRAM(s) IV Push every 4 hours PRN Agitation  melatonin 3 milliGRAM(s) Oral at bedtime PRN Insomnia  ondansetron Injectable 4 milliGRAM(s) IV Push every 8 hours PRN Nausea and/or Vomiting      Objective:  ICU Vital Signs Last 24 Hrs  T(C): 36.8 (23 Oct 2022 07:00), Max: 37.4 (22 Oct 2022 21:20)  T(F): 98.3 (23 Oct 2022 07:00), Max: 99.4 (23 Oct 2022 02:00)  HR: 80 (23 Oct 2022 12:00) (73 - 130)  BP: 110/61 (23 Oct 2022 12:00) (101/66 - 178/92)  BP(mean): 76 (23 Oct 2022 12:00) (76 - 111)  RR: 24 (23 Oct 2022 12:00) (21 - 36)  SpO2: 97% (23 Oct 2022 12:00) (95% - 100%)    O2 Parameters below as of 23 Oct 2022 12:00  Patient On (Oxygen Delivery Method): ventilator    O2 Concentration (%): 45        Mode: AC/ CMV (Assist Control/ Continuous Mandatory Ventilation), RR (machine): 18, TV (machine): 400, FiO2: 70, PEEP: 5, ITime: 1, MAP: 17, PIP: 29    Physical Exam:  GEN: Vegetative state chronic vent  HEENT: normocephalic and atraumatic.   NECK: Supple.   LUNGS: Decreased to auscultation.  HEART: Regular rate and rhythm without murmur.  ABDOMEN: Soft, nontender, and nondistended.  Positive bowel sounds.  +G tube  EXTREMITIES: + edema.  NEUROLOGIC: Vegetative state unresponsive  SKIN: No rash    LABS:                        8.7    12.38 )-----------( 283      ( 23 Oct 2022 06:00 )             29.5   10-23    146<H>  |  113<H>  |  57<H>  ----------------------------<  194<H>  4.0   |  24  |  1.25    Ca    7.8<L>      23 Oct 2022 06:00  Phos  3.8     10-23  Mg     2.6     10-23    TPro  5.7<L>  /  Alb  1.7<L>  /  TBili  0.4  /  DBili  x   /  AST  53<H>  /  ALT  81<H>  /  AlkPhos  89  10-22    MICROBIOLOGY:    Culture - Sputum . (10.19.22 @ 00:58)    -  Minocycline: S [id number]    -  Trimethoprim/Sulfamethoxazole: S <=0.5/9.5    Gram Stain:   Few Squamous epithelial cells per low power field  Few polymorphonuclear leukocytes per low power field  Few Gram positive cocci in pairs per oil power field    -  Levofloxacin: S 2    Specimen Source: .Sputum Sputum trap    Culture Results:   Moderate Mixed gram negative rods including  Moderate Stenotrophomonas maltophilia    Organism Identification: Stenotrophomonas maltophilia    Organism: Stenotrophomonas maltophilia    Organism: Stenotrophomonas maltophilia    Method Type: KB    Method Type: PRATIK    Culture - Urine (collected 10-18-22 @ 12:27)  Source: Clean Catch Clean Catch (Midstream)  Final Report (10-21-22 @ 08:18):    >100,000 CFU/ml Escherichia coli  Organism: Escherichia coli (10-21-22 @ 08:18)  Organism: Escherichia coli (10-21-22 @ 08:18)      -  Amikacin: S <=16      -  Amoxicillin/Clavulanic Acid: S <=8/4      -  Ampicillin: R >16 These ampicillin results predict results for amoxicillin      -  Ampicillin/Sulbactam: I 16/8 Enterobacter, Klebsiella aerogenes, Citrobacter, and Serratia may develop resistance during prolonged therapy (3-4 days)      -  Aztreonam: S <=4      -  Cefazolin: S <=2 For uncomplicated UTI with K. pneumoniae, E. coli, or P. mirablis: PRATIK <=16 is sensitive and PRATIK >=32 is resistant. This also predicts results for oral agents cefaclor, cefdinir, cefpodoxime, cefprozil, cefuroxime axetil, cephalexin and locarbef for uncomplicated UTI. Note that some isolates may be susceptible to these agents while testing resistant to cefazolin.      -  Cefepime: S <=2      -  Cefoxitin: S <=8      -  Ceftriaxone: S <=1 Enterobacter, Klebsiella aerogenes, Citrobacter, and Serratia may develop resistance during prolonged therapy      -  Ciprofloxacin: R >2      -  Ertapenem: I 1      -  Gentamicin: R >8      -  Imipenem: S <=1      -  Levofloxacin: R >4      -  Meropenem: S <=1      -  Nitrofurantoin: S <=32 Should not be used to treat pyelonephritis      -  Piperacillin/Tazobactam: S <=8      -  Tigecycline: S <=2      -  Tobramycin: I 8      -  Trimethoprim/Sulfamethoxazole: R >2/38      Method Type: PRATIK    Culture - Blood (collected 10-18-22 @ 12:00)  Source: .Blood Blood-Peripheral  Preliminary Report (10-19-22 @ 17:01):    No growth to date.    Culture - Blood (collected 10-18-22 @ 12:00)  Source: .Blood Blood-Peripheral  Preliminary Report (10-19-22 @ 17:01):    No growth to date.      Radiology:   ACC: [id number] EXAM:  CT CHEST                          PROCEDURE DATE:  10/18/2022          INTERPRETATION:  CLINICAL INFORMATION: Hypoxia    COMPARISON: 8/18/2022    CONTRAST/COMPLICATIONS:  IV Contrast: NONE  Oral Contrast: NONE  Complications: None reported at time of study completion    PROCEDURE:  CT of the Chest was performed.  Sagittal and coronal reformats were performed.    FINDINGS:    LUNGS AND AIRWAYS: Patent central airways.  Lungs are remarkable for   groundglass opacities in the posterior upper lobes with interlobular   septal thickening. Infiltrate or atelectasis is seen in the lower lobes   left more than right. Patchy densities are also seen in the posterior   upper lobes. Tracheostomy is again appreciated  PLEURA: Small right and small to moderate left pleural effusion with a   large subpulmonic component.  MEDIASTINUM AND JHONNY: Limited for evaluation of adenopathy without IV   contrast. However there are again enlarged mediastinal lymph nodes likely   reactive. The upper esophagus is air filled  VESSELS: Atherosclerotic calcification of the aorta and coronary arteries.  HEART: Heart size is normal. No pericardial effusion.  CHEST WALL AND LOWER NECK: Within normal limits.  VISUALIZED UPPER ABDOMEN: PEG tube is seen. Adrenal glands are thickened.  BONES: Degenerative changes. Again seen is compression fracture of the L3   vertebral body    IMPRESSION: Groundglass opacities and interlobular septal thickening   suggestive of CHF. There are also some more confluent areas of density   which may represent atelectasis versus infiltrates.  Bilateral pleural effusions have decreased from the prior exam  Tracheostomy and gastrostomy      ACC: [id number] EXAM:  CT ABDOMEN AND PELVIS                          PROCEDURE DATE:  10/20/2022          INTERPRETATION:  CLINICAL INFORMATION: 76 years  Female with ro rp bleed.    COMPARISON: 8/20/2022    CONTRAST/COMPLICATIONS:  IV Contrast: NONE  Oral Contrast: NONE  Complications: None reported at time of study completion    PROCEDURE:  CT of the Abdomen and Pelvis was performed.  Sagittal and coronal reformats were performed.    FINDINGS:  LOWER CHEST: Moderate bilateral pleural effusionswith underlying passive   atelectasis, unchanged. Right central line tip in the superior vena cava.    LIVER: Within normal limits.  BILE DUCTS: Normal caliber.  GALLBLADDER: Within normal limits.  SPLEEN: Within normal limits.  PANCREAS: Within normal limits.  ADRENALS: Within normal limits.  KIDNEYS/URETERS: Within normal limits.    BLADDER: Collapsed around Woody catheter.  REPRODUCTIVE ORGANS: Unremarkable uterus.    BOWEL: No bowel obstruction. Appendix is normal.. Gastrostomy tube   balloonin the stomach. Mildly distended rectum with liquid stool  PERITONEUM: Trace ascites.  VESSELS: Within normal limits.  RETROPERITONEUM/LYMPH NODES: No lymphadenopathy. No retroperitoneal   hemorrhage.  ABDOMINAL WALL: Within normal limits.  BONES: Moderate chronic L2 compression fracture deformity. Stable   deformity of the left proximal femur.    IMPRESSION:    No retroperitoneal hemorrhage.    Stable moderate bilateral pleural effusions with underlying compressive   atelectasis.    Small ascites.    Mildly distended rectum with liquid stool.      Pt is a 76W w/ PMHx of DM2, COPD, HTN, dementia, hx of subarachnoid hemorrhage, and history of chronic trach brought in by ambulance for evaluation of low CO2 and cyanotic appearance.   Well-known and has had multiple admissions for Acute on chronic RF and PNA w/ MDROs    Acute VAP/PNA  Acute on chronic HF  CAUTI/UTI  - pt p/w cyanosis  - most recent admission in end of August, most recent cx w/ Pseudomonas and Stenotrophomonas  - labs notable for leukocytosis to 13  - CT chest Groundglass opacities and interlobular septal thickening suggestive of CHF. There are also some more confluent areas of density   which may represent atelectasis versus infiltrates.Bilateral pleural effusions have decreased from the prior exam  - s/p levofloxacin in the ED  - UCx E.coli   - BCx NGTD  - sputum cx w/ Stenotrophomonas maltophilia      Plan:   C/w levofloxacin 750mg q48h  Cont ceftriaxone 1gm q24h for possible UTI/CAUTI  Trend temps and cbc  Isolation per infection control policy given hx of CRE  Supportive care/vent management per CCM      Continue with present regiment.  Appropriate use of antibiotics and adverse effects reviewed.      Critical care time greater then 35 minutes reviewing notes, labs data/ imaging , discussion with multidisciplinary team.    Thank you for allowing me to participate in care of your patient .        Eusebio Chairez MD  Infectious Disease  [phone]

## 2022-10-23 NOTE — PROGRESS NOTE ADULT - ASSESSMENT
75yo F with PMH of dementia, COPD with chronic resp failure and is vent dependent, s/p PEG, hx of cardiac arrest, diastolic CHF, cor pulmonale, hx of CVA, COVID-19 infxn, CKD, anemia, multiple admissions for respiratory distress (recent admission from 6/1-6/9 diagnosed with PNA with parapneumonic pleural effusion s/p thoracentesis and Avycaz) who presents from [facility] for respiratory distress again a/w sepsis and respiratory failure due to suspected recurrent gram-negative PNA.    Severe sepsis and acute hypoxic respiratory failure 2/2 gram-negative PNA   - continue current vent settings (on AC with RR 16, , PEEP 5, FiO2 100%) maintain O2 sat >92%  - was on ertapenem,  zosyn+bactrim , now on levaquin  - lactate downtrending, procalcitonin high but improving with treatment (4.76 -> 3.34 -> 1.77)  - cautious use of fluids given hx of CHF (received 1750cc of NS in ED)  - f/u blood cultures (NGTD), urine culture pending   - f/u trach sputum culture - has GNR growing awaiting speciation / sensitivities  - has hx of respiratory distress driven by vent asynchrony and would require IV benzos ; trialed IV fentanyl with adequate effect this evening  - Checked CT Abd/P to eval for any other potential source sign of infection and found no significant sign of intra-abdominal infection on CT  - cc/pulm consult (Jaime), recs appreciated  - palliative care (Emre), recs appreciated - pt is full code  C/w levofloxacin 750mg q48h based on most recent cx per ID recs  ID added ceftriaxone 1gm q24h for possible UTI/CAUTI  WBC elevated today, but afebrile overnight    Diastolic CHF  Hx of Pleural effusions  - last TTE was 5/30 with EF 65% with normal LVSF, dilated   RV, and moderate pHTN -> repeat TTE appears unchanged  - may need repeat thoracentesis (had 1.5L drained three admissions ago), pulmonology consulted; was parapneumonic on that admission as opposed to transudative and thus less likely related to CHF    hyperkalemia RYAN likely due to sepsis  will give bolus of fluids: careful use going forward.  repeat Cr and K    Anemia of chronic disease  - has been evaluated by GI and hematology in the past -> dx with ACD with intermittent GI bleeding  - s/p 1 unit yesterday, another unit ordered today 2/2 today's Hb being 6.3    Hyperkalemia  - given both insulin and lasix   still remains elevated  nutrition consulted    Hx of CKD stage 3 - near baseline of 1.2-1.3  - renally dose medications and monitor BMP and electrolytes   - Cr is 1.3 with eGFR of 41, but given pt has low muscle mass from being bedbound for years, this GFR estimate is likely falsely high  - consider nephro eval   nutrition consulted    HTN  - not on any BP meds at facility  - monitor VS    DM2  - NPO with TF  - continue with insulin 8units qAM as per last admission  - c/w moderate insulin coverage scale  - goal -180    Diet  - continue with same TF from last admission    Preventive measures  - cont with heparin subq for DVT ppx  - Full Code    75yo F with PMH of dementia, COPD with chronic resp failure and is vent dependent, s/p PEG, hx of cardiac arrest, diastolic CHF, cor pulmonale, hx of CVA, COVID-19 infxn, CKD, anemia, multiple admissions for respiratory distress (recent admission from 6/1-6/9 diagnosed with PNA with parapneumonic pleural effusion s/p thoracentesis and Avycaz) who presents from [facility] for respiratory distress again a/w sepsis and respiratory failure due to suspected recurrent gram-negative PNA.    Severe sepsis and acute hypoxic respiratory failure 2/2 gram-negative PNA   - continue current vent settings (on AC with RR 16, , PEEP 5, FiO2 100%) maintain O2 sat >92%  - was on ertapenem,  zosyn+bactrim , now on levaquin  - lactate downtrending, procalcitonin high but improving with treatment (4.76 -> 3.34 -> 1.77)  - cautious use of fluids given hx of CHF (received 1750cc of NS in ED)  - f/u blood cultures (NGTD), urine culture pending   - f/u trach sputum culture - has GNR growing awaiting speciation / sensitivities  - has hx of respiratory distress driven by vent asynchrony and would require IV benzos ; trialed IV fentanyl with adequate effect this evening  - Checked CT Abd/P to eval for any other potential source sign of infection and found no significant sign of intra-abdominal infection on CT  - cc/pulm consult (Jaime), recs appreciated  - palliative care (Emre), recs appreciated - pt is full code  C/w levofloxacin 750mg q48h based on most recent cx per ID recs  ID added ceftriaxone 1gm q24h for possible UTI/CAUTI  WBC elevated today, but afebrile overnight    Diastolic CHF  Hx of Pleural effusions  - last TTE was 5/30 with EF 65% with normal LVSF, dilated   RV, and moderate pHTN -> repeat TTE appears unchanged  - may need repeat thoracentesis (had 1.5L drained three admissions ago), pulmonology consulted; was parapneumonic on that admission as opposed to transudative and thus less likely related to CHF    hyperkalemia RYAN likely due to sepsis  will give bolus of fluids: careful use going forward.  repeat Cr and K    Anemia of chronic disease  - has been evaluated by GI and hematology in the past -> dx with ACD with intermittent GI bleeding  - s/p 2 unit PRBC   - current HB stable    Hyperkalemia  - given both insulin and lasix   still remains elevated  nutrition consulted    Hx of CKD stage 3 - near baseline of 1.2-1.3  - renally dose medications and monitor BMP and electrolytes   - Cr is 1.3 with eGFR of 41, but given pt has low muscle mass from being bedbound for years, this GFR estimate is likely falsely high  - consider nephro eval   nutrition consulted    HTN  - not on any BP meds at facility  - monitor VS    DM2  - NPO with TF  - continue with insulin 8units qAM as per last admission  - c/w moderate insulin coverage scale  - goal -180    Diet  - continue TF, tolerating    Preventive measures  - cont with heparin subq for DVT ppx  - Full Code    77yo F with PMH of dementia, COPD with chronic resp failure and is vent dependent, s/p PEG, hx of cardiac arrest, diastolic CHF, cor pulmonale, hx of CVA, COVID-19 infxn, CKD, anemia, multiple admissions for respiratory distress (recent admission from 6/1-6/9 diagnosed with PNA with parapneumonic pleural effusion s/p thoracentesis and Avycaz) who presents from [facility] for respiratory distress again a/w sepsis and respiratory failure due to suspected recurrent gram-negative PNA.    Severe sepsis and acute hypoxic respiratory failure 2/2 gram-negative PNA   - continue current vent settings (on AC with RR 16, , PEEP 5, FiO2 100%) maintain O2 sat >92%  - was on ertapenem,  zosyn+bactrim , now on levaquin  - lactate downtrending, procalcitonin high but improving with treatment (4.76 -> 3.34 -> 1.77)  - cautious use of fluids given hx of CHF (received 1750cc of NS in ED)  - f/u blood cultures (NGTD), urine culture pending   - f/u trach sputum culture - has GNR growing awaiting speciation / sensitivities  - has hx of respiratory distress driven by vent asynchrony and would require IV benzos ; trialed IV fentanyl with adequate effect this evening  - Checked CT Abd/P to eval for any other potential source sign of infection and found no significant sign of intra-abdominal infection on CT  - cc/pulm consult (Jaime), recs appreciated  - palliative care (Emre), recs appreciated - pt is full code  C/w levofloxacin 750mg q48h based on most recent cx per ID recs  ID added ceftriaxone 1gm q24h for possible UTI/CAUTI  WBC elevated today, but afebrile overnight    Diastolic CHF  Hx of Pleural effusions  - last TTE was 5/30 with EF 65% with normal LVSF, dilated   RV, and moderate pHTN -> repeat TTE appears unchanged  - may need repeat thoracentesis (had 1.5L drained three admissions ago), pulmonology consulted; was parapneumonic on that admission as opposed to transudative and thus less likely related to CHF    hyperkalemia RYAN likely due to sepsis  will give bolus of fluids: careful use going forward.  repeat Cr and K    Anemia of chronic disease  - has been evaluated by GI and hematology in the past -> dx with ACD with intermittent GI bleeding  - s/p 2 unit PRBC   - current HB stable    Hyperkalemia  - given both insulin and lasix   still remains elevated  nutrition consulted    Hx of CKD stage 3 - near baseline of 1.2-1.3  - renally dose medications and monitor BMP and electrolytes   - Cr is 1.3 with eGFR of 41, but given pt has low muscle mass from being bedbound for years, this GFR estimate is likely falsely high  - consider nephro eval   nutrition consulted    HTN  - not on any BP meds at facility  - monitor VS    DM2  - NPO with TF  - continue with insulin 8units qAM as per last admission  - c/w moderate insulin coverage scale  - goal -180      Diet  - TF on hold 2/2 leak around site, will need GI to eval before restarting    Preventive measures  - cont with heparin subq for DVT ppx  - Full Code

## 2022-10-23 NOTE — PROGRESS NOTE ADULT - SUBJECTIVE AND OBJECTIVE BOX
Date/Time Patient Seen:  		  Referring MD:   Data Reviewed	       Patient is a 76y old  Female who presents with a chief complaint of Acute on Hypoxemic respiratory failure (22 Oct 2022 22:00)      Subjective/HPI     PAST MEDICAL & SURGICAL HISTORY:  Dementia of frontal lobe type    Aphasic stroke    Diabetes mellitus    Respiratory failure    Hypertension    GERD (gastroesophageal reflux disease)    Constipation    Respiratory failure    CVA (cerebral vascular accident)    HTN (hypertension)    DM (diabetes mellitus)    Advanced dementia    COVID-19 virus detected    Quadriplegia    Pneumonia    Type II diabetes mellitus    Hx of appendectomy    Gastrostomy in place    Tracheostomy in place    Tracheostomy tube present    Feeding by G-tube          Medication list         MEDICATIONS  (STANDING):  ALBUTerol    90 MICROgram(s) HFA Inhaler 2 Puff(s) Inhalation every 6 hours  cadexomer iodine 0.9% Gel 1 Application(s) Topical <User Schedule>  cefTRIAXone   IVPB 1000 milliGRAM(s) IV Intermittent every 24 hours  cefTRIAXone   IVPB      chlorhexidine 0.12% Liquid 15 milliLiter(s) Oral Mucosa every 12 hours  chlorhexidine 2% Cloths 1 Application(s) Topical two times a day  dexMEDEtomidine Infusion 0.5 MICROgram(s)/kG/Hr (6.14 mL/Hr) IV Continuous <Continuous>  dextrose 5%. 1000 milliLiter(s) (50 mL/Hr) IV Continuous <Continuous>  dextrose 5%. 1000 milliLiter(s) (100 mL/Hr) IV Continuous <Continuous>  dextrose 50% Injectable 25 Gram(s) IV Push once  dextrose 50% Injectable 12.5 Gram(s) IV Push once  dextrose 50% Injectable 25 Gram(s) IV Push once  glucagon  Injectable 1 milliGRAM(s) IntraMuscular once  heparin   Injectable 5000 Unit(s) SubCutaneous every 12 hours  insulin lispro (ADMELOG) corrective regimen sliding scale   SubCutaneous every 6 hours  ipratropium 17 MICROgram(s) HFA Inhaler 1 Puff(s) Inhalation every 6 hours  levoFLOXacin IVPB 750 milliGRAM(s) IV Intermittent every 48 hours  LORazepam     Tablet 1 milliGRAM(s) Oral every 6 hours  midodrine 10 milliGRAM(s) Oral every 8 hours  pantoprazole    Tablet 40 milliGRAM(s) Oral before breakfast  povidone iodine 10% Solution 1 Application(s) Topical daily  simethicone 80 milliGRAM(s) Chew every 12 hours    MEDICATIONS  (PRN):  acetaminophen     Tablet .. 650 milliGRAM(s) Oral every 6 hours PRN Temp greater or equal to 38C (100.4F), Mild Pain (1 - 3)  aluminum hydroxide/magnesium hydroxide/simethicone Suspension 30 milliLiter(s) Oral every 4 hours PRN Dyspepsia  dextrose Oral Gel 15 Gram(s) Oral once PRN Blood Glucose LESS THAN 70 milliGRAM(s)/deciliter  LORazepam   Injectable 1 milliGRAM(s) IV Push every 4 hours PRN Agitation  melatonin 3 milliGRAM(s) Oral at bedtime PRN Insomnia  ondansetron Injectable 4 milliGRAM(s) IV Push every 8 hours PRN Nausea and/or Vomiting         Vitals log        ICU Vital Signs Last 24 Hrs  T(C): 37.2 (23 Oct 2022 04:02), Max: 37.4 (22 Oct 2022 21:20)  T(F): 98.9 (23 Oct 2022 04:02), Max: 99.4 (23 Oct 2022 02:00)  HR: 80 (23 Oct 2022 06:39) (63 - 130)  BP: 109/60 (23 Oct 2022 06:00) (101/66 - 178/92)  BP(mean): 76 (23 Oct 2022 06:00) (76 - 111)  ABP: --  ABP(mean): --  RR: 28 (23 Oct 2022 06:39) (17 - 36)  SpO2: 100% (23 Oct 2022 06:39) (95% - 100%)    O2 Parameters below as of 23 Oct 2022 06:39  Patient On (Oxygen Delivery Method): ventilator    O2 Concentration (%): 70         Mode: AC/ CMV (Assist Control/ Continuous Mandatory Ventilation)  RR (machine): 18  TV (machine): 400  FiO2: 70  PEEP: 5  ITime: 1  MAP: 17  PIP: 28      Input and Output:  I&O's Detail    21 Oct 2022 07:01  -  22 Oct 2022 07:00  --------------------------------------------------------  IN:    IV PiggyBack: 50 mL    sodium chloride 0.9%: 1520 mL  Total IN: 1570 mL    OUT:    Blood Loss (mL): 1 mL    Indwelling Catheter - Urethral (mL): 900 mL  Total OUT: 901 mL    Total NET: 669 mL      22 Oct 2022 07:01  -  23 Oct 2022 06:53  --------------------------------------------------------  IN:    Free Water: 750 mL    IV PiggyBack: 750 mL    Nepro with Carb Steady: 620 mL    sodium chloride 0.9%: 320 mL  Total IN: 2440 mL    OUT:    Indwelling Catheter - Urethral (mL): 750 mL  Total OUT: 750 mL    Total NET: 1690 mL          Lab Data                        8.7    12.38 )-----------( 283      ( 23 Oct 2022 06:00 )             29.5     10-22    148<H>  |  115<H>  |  57<H>  ----------------------------<  129<H>  3.9   |  23  |  1.18    Ca    7.7<L>      22 Oct 2022 06:07  Phos  3.9     10-22  Mg     2.9     10-22    TPro  5.7<L>  /  Alb  1.7<L>  /  TBili  0.4  /  DBili  x   /  AST  53<H>  /  ALT  81<H>  /  AlkPhos  89  10-22            Review of Systems	      Objective     Physical Examination  heart s1s2  lung dec BS  head nc        Pertinent Lab findings & Imaging      Annalise:  NO   Adequate UO     I&O's Detail    21 Oct 2022 07:01  -  22 Oct 2022 07:00  --------------------------------------------------------  IN:    IV PiggyBack: 50 mL    sodium chloride 0.9%: 1520 mL  Total IN: 1570 mL    OUT:    Blood Loss (mL): 1 mL    Indwelling Catheter - Urethral (mL): 900 mL  Total OUT: 901 mL    Total NET: 669 mL      22 Oct 2022 07:01  -  23 Oct 2022 06:53  --------------------------------------------------------  IN:    Free Water: 750 mL    IV PiggyBack: 750 mL    Nepro with Carb Steady: 620 mL    sodium chloride 0.9%: 320 mL  Total IN: 2440 mL    OUT:    Indwelling Catheter - Urethral (mL): 750 mL  Total OUT: 750 mL    Total NET: 1690 mL               Discussed with:     Cultures:	        Radiology

## 2022-10-23 NOTE — CONSULT NOTE ADULT - CONSULT REQUESTED DATE/TIME
18-Oct-2022
23-Oct-2022 12:51
18-Oct-2022 13:13
18-Oct-2022 18:03
24-Aug-2022 11:25
18-Oct-2022 16:19

## 2022-10-23 NOTE — PROGRESS NOTE ADULT - SUBJECTIVE AND OBJECTIVE BOX
Patient is a 76y old  Female who presents with a chief complaint of Acute on Hypoxemic respiratory failure (23 Oct 2022 06:53)      INTERVAL HPI/OVERNIGHT EVENTS:    no overnight events  tolerating TF.     WBC elevated today    MEDICATIONS  (STANDING):  ALBUTerol    90 MICROgram(s) HFA Inhaler 2 Puff(s) Inhalation every 6 hours  cadexomer iodine 0.9% Gel 1 Application(s) Topical <User Schedule>  cefTRIAXone   IVPB 1000 milliGRAM(s) IV Intermittent every 24 hours  cefTRIAXone   IVPB      chlorhexidine 0.12% Liquid 15 milliLiter(s) Oral Mucosa every 12 hours  chlorhexidine 2% Cloths 1 Application(s) Topical two times a day  dexMEDEtomidine Infusion 0.5 MICROgram(s)/kG/Hr (6.14 mL/Hr) IV Continuous <Continuous>  dextrose 5%. 1000 milliLiter(s) (50 mL/Hr) IV Continuous <Continuous>  dextrose 5%. 1000 milliLiter(s) (100 mL/Hr) IV Continuous <Continuous>  dextrose 50% Injectable 25 Gram(s) IV Push once  dextrose 50% Injectable 12.5 Gram(s) IV Push once  dextrose 50% Injectable 25 Gram(s) IV Push once  glucagon  Injectable 1 milliGRAM(s) IntraMuscular once  heparin   Injectable 5000 Unit(s) SubCutaneous every 12 hours  insulin lispro (ADMELOG) corrective regimen sliding scale   SubCutaneous every 6 hours  ipratropium 17 MICROgram(s) HFA Inhaler 1 Puff(s) Inhalation every 6 hours  levoFLOXacin IVPB 750 milliGRAM(s) IV Intermittent every 48 hours  LORazepam     Tablet 1 milliGRAM(s) Oral every 6 hours  midodrine 10 milliGRAM(s) Oral every 8 hours  pantoprazole    Tablet 40 milliGRAM(s) Oral before breakfast  povidone iodine 10% Solution 1 Application(s) Topical daily  simethicone 80 milliGRAM(s) Chew every 12 hours    MEDICATIONS  (PRN):  acetaminophen     Tablet .. 650 milliGRAM(s) Oral every 6 hours PRN Temp greater or equal to 38C (100.4F), Mild Pain (1 - 3)  aluminum hydroxide/magnesium hydroxide/simethicone Suspension 30 milliLiter(s) Oral every 4 hours PRN Dyspepsia  dextrose Oral Gel 15 Gram(s) Oral once PRN Blood Glucose LESS THAN 70 milliGRAM(s)/deciliter  LORazepam   Injectable 1 milliGRAM(s) IV Push every 4 hours PRN Agitation  melatonin 3 milliGRAM(s) Oral at bedtime PRN Insomnia  ondansetron Injectable 4 milliGRAM(s) IV Push every 8 hours PRN Nausea and/or Vomiting      Allergies    codeine (Hives)    Intolerances        REVIEW OF SYSTEMS:  unable to obtain    Vital Signs Last 24 Hrs  T(C): 37.2 (23 Oct 2022 04:02), Max: 37.4 (22 Oct 2022 21:20)  T(F): 98.9 (23 Oct 2022 04:02), Max: 99.4 (23 Oct 2022 02:00)  HR: 80 (23 Oct 2022 06:39) (67 - 130)  BP: 109/60 (23 Oct 2022 06:00) (101/66 - 178/92)  BP(mean): 76 (23 Oct 2022 06:00) (76 - 111)  RR: 28 (23 Oct 2022 06:39) (17 - 36)  SpO2: 100% (23 Oct 2022 06:39) (95% - 100%)    Parameters below as of 23 Oct 2022 06:39  Patient On (Oxygen Delivery Method): ventilator    O2 Concentration (%): 70    PHYSICAL EXAM:  General: NAD  HEENT: NC/AT, EOMI,   Neck: trach to vent  Lungs: MV breath sounds  Heart: Regular rate and rhythm. No murmur, rub or gallop.  Abdomen: Soft. Nondistended. Nontender.   Skin: Warm. Dry.    LABS:                        8.7    12.38 )-----------( 283      ( 23 Oct 2022 06:00 )             29.5     23 Oct 2022 06:00    146    |  113    |  57     ----------------------------<  194    4.0     |  24     |  1.25     Ca    7.8        23 Oct 2022 06:00  Phos  3.8       23 Oct 2022 06:00  Mg     2.6       23 Oct 2022 06:00          CAPILLARY BLOOD GLUCOSE      POCT Blood Glucose.: 172 mg/dL (23 Oct 2022 05:09)  POCT Blood Glucose.: 246 mg/dL (22 Oct 2022 23:14)  POCT Blood Glucose.: 174 mg/dL (22 Oct 2022 17:14)  POCT Blood Glucose.: 144 mg/dL (22 Oct 2022 11:04)      RADIOLOGY & ADDITIONAL TESTS:     Patient is a 76y old  Female who presents with a chief complaint of Acute on Hypoxemic respiratory failure (23 Oct 2022 06:53)      INTERVAL HPI/OVERNIGHT EVENTS:    overnight ICU PA noticed leakage around peg site, consulted GI to eval in the morning    WBC elevated today    MEDICATIONS  (STANDING):  ALBUTerol    90 MICROgram(s) HFA Inhaler 2 Puff(s) Inhalation every 6 hours  cadexomer iodine 0.9% Gel 1 Application(s) Topical <User Schedule>  cefTRIAXone   IVPB 1000 milliGRAM(s) IV Intermittent every 24 hours  cefTRIAXone   IVPB      chlorhexidine 0.12% Liquid 15 milliLiter(s) Oral Mucosa every 12 hours  chlorhexidine 2% Cloths 1 Application(s) Topical two times a day  dexMEDEtomidine Infusion 0.5 MICROgram(s)/kG/Hr (6.14 mL/Hr) IV Continuous <Continuous>  dextrose 5%. 1000 milliLiter(s) (50 mL/Hr) IV Continuous <Continuous>  dextrose 5%. 1000 milliLiter(s) (100 mL/Hr) IV Continuous <Continuous>  dextrose 50% Injectable 25 Gram(s) IV Push once  dextrose 50% Injectable 12.5 Gram(s) IV Push once  dextrose 50% Injectable 25 Gram(s) IV Push once  glucagon  Injectable 1 milliGRAM(s) IntraMuscular once  heparin   Injectable 5000 Unit(s) SubCutaneous every 12 hours  insulin lispro (ADMELOG) corrective regimen sliding scale   SubCutaneous every 6 hours  ipratropium 17 MICROgram(s) HFA Inhaler 1 Puff(s) Inhalation every 6 hours  levoFLOXacin IVPB 750 milliGRAM(s) IV Intermittent every 48 hours  LORazepam     Tablet 1 milliGRAM(s) Oral every 6 hours  midodrine 10 milliGRAM(s) Oral every 8 hours  pantoprazole    Tablet 40 milliGRAM(s) Oral before breakfast  povidone iodine 10% Solution 1 Application(s) Topical daily  simethicone 80 milliGRAM(s) Chew every 12 hours    MEDICATIONS  (PRN):  acetaminophen     Tablet .. 650 milliGRAM(s) Oral every 6 hours PRN Temp greater or equal to 38C (100.4F), Mild Pain (1 - 3)  aluminum hydroxide/magnesium hydroxide/simethicone Suspension 30 milliLiter(s) Oral every 4 hours PRN Dyspepsia  dextrose Oral Gel 15 Gram(s) Oral once PRN Blood Glucose LESS THAN 70 milliGRAM(s)/deciliter  LORazepam   Injectable 1 milliGRAM(s) IV Push every 4 hours PRN Agitation  melatonin 3 milliGRAM(s) Oral at bedtime PRN Insomnia  ondansetron Injectable 4 milliGRAM(s) IV Push every 8 hours PRN Nausea and/or Vomiting      Allergies    codeine (Hives)    Intolerances        REVIEW OF SYSTEMS:  unable to obtain    Vital Signs Last 24 Hrs  T(C): 37.2 (23 Oct 2022 04:02), Max: 37.4 (22 Oct 2022 21:20)  T(F): 98.9 (23 Oct 2022 04:02), Max: 99.4 (23 Oct 2022 02:00)  HR: 80 (23 Oct 2022 06:39) (67 - 130)  BP: 109/60 (23 Oct 2022 06:00) (101/66 - 178/92)  BP(mean): 76 (23 Oct 2022 06:00) (76 - 111)  RR: 28 (23 Oct 2022 06:39) (17 - 36)  SpO2: 100% (23 Oct 2022 06:39) (95% - 100%)    Parameters below as of 23 Oct 2022 06:39  Patient On (Oxygen Delivery Method): ventilator    O2 Concentration (%): 70    PHYSICAL EXAM:  General: NAD  HEENT: NC/AT, EOMI,   Neck: trach to vent  Lungs: MV breath sounds  Heart: Regular rate and rhythm. No murmur, rub or gallop.  Abdomen: Soft. Nondistended. Nontender. +PEG  Skin: Warm. Dry.    LABS:                        8.7    12.38 )-----------( 283      ( 23 Oct 2022 06:00 )             29.5     23 Oct 2022 06:00    146    |  113    |  57     ----------------------------<  194    4.0     |  24     |  1.25     Ca    7.8        23 Oct 2022 06:00  Phos  3.8       23 Oct 2022 06:00  Mg     2.6       23 Oct 2022 06:00          CAPILLARY BLOOD GLUCOSE      POCT Blood Glucose.: 172 mg/dL (23 Oct 2022 05:09)  POCT Blood Glucose.: 246 mg/dL (22 Oct 2022 23:14)  POCT Blood Glucose.: 174 mg/dL (22 Oct 2022 17:14)  POCT Blood Glucose.: 144 mg/dL (22 Oct 2022 11:04)      RADIOLOGY & ADDITIONAL TESTS:

## 2022-10-23 NOTE — PROGRESS NOTE ADULT - SUBJECTIVE AND OBJECTIVE BOX
Chief Complaint: Hypoxia    Interval Events: No events overnight.    Review of Systems:  Unable to obtain    Physical Exam:  Vital Signs Last 24 Hrs  T(C): 36.8 (23 Oct 2022 07:00), Max: 37.4 (22 Oct 2022 21:20)  T(F): 98.3 (23 Oct 2022 07:00), Max: 99.4 (23 Oct 2022 02:00)  HR: 83 (23 Oct 2022 11:00) (67 - 130)  BP: 111/63 (23 Oct 2022 11:00) (101/66 - 178/92)  BP(mean): 77 (23 Oct 2022 11:00) (76 - 111)  RR: 22 (23 Oct 2022 11:00) (21 - 36)  SpO2: 98% (23 Oct 2022 11:00) (95% - 100%)  Parameters below as of 23 Oct 2022 11:00  Patient On (Oxygen Delivery Method): ventilator  O2 Concentration (%): 45  General: Unresponsive  HEENT: Trach  Neck: No JVD, no carotid bruit  Lungs: CTAB  CV: RRR, nl S1/S2, no M/R/G  Abdomen: S/NT/ND, +BS  Extremities: No LE edema, no cyanosis  Neuro: AAOx0  Skin: No rash    Labs:    10-23    146<H>  |  113<H>  |  57<H>  ----------------------------<  194<H>  4.0   |  24  |  1.25    Ca    7.8<L>      23 Oct 2022 06:00  Phos  3.8     10-23  Mg     2.6     10-23    TPro  5.7<L>  /  Alb  1.7<L>  /  TBili  0.4  /  DBili  x   /  AST  53<H>  /  ALT  81<H>  /  AlkPhos  89  10-22                        8.7    12.38 )-----------( 283      ( 23 Oct 2022 06:00 )             29.5       Telemetry: Sinus rhythm

## 2022-10-23 NOTE — CONSULT NOTE ADULT - REASON FOR ADMISSION
Acute on Hypoxemic respiratory failure

## 2022-10-23 NOTE — PROGRESS NOTE ADULT - ASSESSMENT
76F with dementia, COPD with chronic resp failure and is vent dependent, s/p PEG, hx of cardiac arrest, CHF, cor pulmonale, CVA, COVID-19 infxn, CKD, anemia, multiple admissions    dementia  COPD  chr resp failure  vegetative state  dysphagia  peg  hx of card arrest - anoxic brain  CHF  cva      on emp ABX  CCM notes reviewed  vs noted  labs reviewed  Wound care in progress -     Diagnosis; Prognosis; MOLST Discussed  Marciano -   HCP  pt is full code  spoke with  -

## 2022-10-23 NOTE — CONSULT NOTE ADULT - ASSESSMENT
leaky peg    plan    readjusted peg  in and out  ppi once a day  gerd precautions    Advanced care planning was discussed with patient and family.  Advanced care planning forms were reviewed and discussed.  Risks, benefits and alternatives of gastroenterologic procedures were discussed in detail and all questions were answered.    30 minutes spent.

## 2022-10-23 NOTE — PROGRESS NOTE ADULT - ASSESSMENT
Impression - 75 y/o female, FULL CODE, with PMHx of dementia, COPD with chronic resp failure and is vent dependent, s/p Trach/PEG, hx of cardiac arrest, CHF, cor pulmonale, CVA, COVID-19 infxn, CKD, anemia, multiple admissions for respiratory distress (last admission from 6/1-6/9  and now august diagnosed with PNA with parapneumonic pleural effusion s/p course of Ertapenem who presents from [facility] for respiratory distress . Patient not verbal, demented unable to obtain any hx. Patient was admitted to the [facility] for acute on chronic hypoxic respiratory failure 2/2 to pna, sepsis, chronic anemia, RYAN, hyperK, hyperglycemia and mild hypoNa. Transfused PRBC x 2. Patient's Hgb 7.4<--8.2, CT ABD/Pel significant for moderate B/L pleural effusions with atelectasis, but did not reveal retroperitoneal hemorrhage. No additional events reported throughout the day. Repeat Hgb up-trending 8.0<--7.4, CTN downtrending, BG well controlled <180, and magnesium noted to be slightly elevated. Unable to wean FIO2 requirements on vent.  Patient's continues on full vent support, but able to start weaning FIO2 requirements today. Leukocytosis, Hgb stable, and CTN up-trending.    1. Acute on chronic hypoxemic resp failure, improving  2. Acute and chronic anemia, Hgb up-trending  3. RYAN on CKD, CTN up-trending  4. Leukocytosis  5. PNA/VAP  6. Sepsis, improving  7. UTI  8. Hypermagnesemia, improving    Neuro - AAOx0 at baseline/non-verbal, continue ativan TID. Remains off precedex, will continue to monitor closely and consider PRN IVP and/or gtt sedation for vent synchrony.  Cardiac - BP stable MAP>65 on midodrine TID, Hgb up-trending 8.7<--8.0<--7.4, trend cbc, transfuse PRN, trend lytes, replete PRN, magnesium level WNL today, will repeat in AM, prior TTE from 8/22 normal LV systolic function w/moderate PAH, cardiology following and input appreciated   Resp - Continues on full vent support w/6cc/kg of IBW per ARDS net protocol, actively titrating FIO2 w/goal SPO2>90%, able to start weaning patient's FIO2 requirements, utilization of PEEP for alveolar recruitment, trend ABG, chest PT, likely parapneumonic B/L pleural effusions    GI - Tube feed increased from 20ml/hr to 40ml/hr, continue H2O flushes Q6/hrs, daily PPI, PRN Zofran for N/V, PRN bowel regimen  Renal - RYAN improving, but CTN slightly higher today, strict I/Os, +vaughn cath, IVF, goal UOP .5cc/kg, avoid nephrotoxic agents, renal dose meds  Endo - ISS protocol initiated, BG slightly elevated, strict glucose control w/goal BG <180, A1c 8.5  Heme - DVT PPx w/UFH SQ, SCDs  ID - Leukocytosis worsening, continue to trend CBC/fevers, antipyretics PRN, UCx+ E coli - continue ceftriaxone 1G Q24/hrs, BCx NGT, continue levofloxacin 750 IVPB Q48/hrs for PNA, ID following and input appreciated    Impression - 77 y/o female, FULL CODE, with PMHx of dementia, COPD with chronic resp failure and is vent dependent, s/p Trach/PEG, hx of cardiac arrest, CHF, cor pulmonale, CVA, COVID-19 infxn, CKD, anemia, multiple admissions for respiratory distress (last admission from 6/1-6/9  and now august diagnosed with PNA with parapneumonic pleural effusion s/p course of Ertapenem who presents from [facility] for respiratory distress . Patient not verbal, demented unable to obtain any hx. Patient was admitted to the SPCU for acute on chronic hypoxic respiratory failure 2/2 to pna, sepsis, chronic anemia, RYAN, hyperK, hyperglycemia and mild hypoNa. Transfused PRBC x 2. Patient's Hgb 7.4<--8.2, CT ABD/Pel significant for moderate B/L pleural effusions with atelectasis, but did not reveal retroperitoneal hemorrhage. No additional events reported throughout the day. Repeat Hgb up-trending 8.0<--7.4, CTN downtrending, BG well controlled <180, and magnesium noted to be slightly elevated. Unable to wean FIO2 requirements on vent.  Patient's continues on full vent support, unable to wean FIO2, leukocytosis, Hgb stable, and CTN up-trending.    1. Acute on chronic hypoxemic resp failure  2. Acute and chronic anemia, Hgb up-trending  3. RYAN on CKD, CTN up-trending  4. Leukocytosis  5. PNA/VAP  6. Sepsis, improving  7. UTI  8. Hypermagnesemia, improving    Neuro - AAOx0 at baseline/non-verbal, continue ativan TID. Remains off precedex, will continue to monitor closely and consider PRN IVP and/or gtt sedation for vent synchrony.  Cardiac - BP stable MAP>65 on midodrine TID, Hgb up-trending 8.7<--8.0<--7.4, trend cbc, transfuse PRN, trend lytes, replete PRN, magnesium level WNL today, will repeat in AM, prior TTE from 8/22 normal LV systolic function w/moderate PAH, cardiology following and input appreciated   Resp - Continues on full vent support w/6cc/kg of IBW per ARDS net protocol, actively titrating FIO2 w/goal SPO2>90%, unable to wean patient's FIO2 requirements, currently at 100% again, utilization of PEEP for alveolar recruitment, trend ABG, chest PT, likely parapneumonic B/L pleural effusions    GI - Tube feed increased from 20ml/hr to 40ml/hr, continue H2O flushes Q6/hrs, daily PPI, PRN Zofran for N/V, PRN bowel regimen  Renal - RYAN improving, but CTN slightly higher today, strict I/Os, +vaughn cath, IVF, goal UOP .5cc/kg, avoid nephrotoxic agents, renal dose meds  Endo - ISS protocol initiated, BG slightly elevated, strict glucose control w/goal BG <180, A1c 8.5  Heme - DVT PPx w/UFH SQ, SCDs  ID - Leukocytosis worsening, continue to trend CBC/fevers, antipyretics PRN, UCx+ E coli - continue ceftriaxone 1G Q24/hrs, BCx NGT, continue levofloxacin 750 IVPB Q48/hrs for PNA, ID following and input appreciated

## 2022-10-23 NOTE — PROGRESS NOTE ADULT - ASSESSMENT
REVIEW OF SYMPTOMS      Able to give (reliable) ROS  NO     PHYSICAL EXAM    HEENT Unremarkable  atraumatic   RESP Fair air entry EXP prolonged    Harsh breath sound Resp distres mild   CARDIAC S1 S2 No S3     NO JVD    ABDOMEN SOFT BS PRESENT NOT DISTENDED No hepatosplenomegaly   PEDAL EDEMA present No calf tenderness  NO rash       GENERAL DATA .   GOC.  10/18/2022 full code       ALLGY. codeine                            WT.          10/18/2022 48  BMI.          10/18/2022 17                     ICU STAY. 10/18/2022  COVID.  10/18/2022 scv2 (-)     BEST PRACTICE ISSUES.    HOB ELEVATN. Yes  DVT PPLX.   10/18/2022 hpsc    SQUIRES PPLX.  10/18/2022 protonix 40     INFN PPLX.    10/18/2022 ch;lorhexidine .12%  10/19/2022 chlorhexidine 2%    SP SW EM.        DIET.     10/19/2022 nepro 800    VS/ PO/IO/ VENT/ DRIPS.  10/23/2022 80 114/80   10/23/2022 ac 18/400/5/.7     ASSESSMENT/RECOMMENDATIONS .   RESP.  -- Gas exchange.   10/18/2022 ac 18/400/100/5 751/37/257  -- VENT MANAGEMENT.   HOB elevation  Target Pplat 30 (-)  Target PO 90-95%  Target pH 730 (+)  Daily spontaneous breathing trials   Daily sedation vacation   -- Pleuralk effsn  Past ritchie   R THORAC   6/8/2022  g 161 l 222 p 4.9 p 10 l 16 .38    L THORAC   5/25/2022 g 183 l 144/381 .37 p 3.4/5.3 .64 p 23 l 36   5/25/2022l pl fluid  lymph pred exudate   cyto (-)     Ct  10/18/2022 sm r mod l effs large subpulmonic component   a/r No thoracentesis plannd at this point risk prohibitive in vent pt   -- Mediastinal lne.  Ct  10/18/2022 again enlarged mediastinal lns likely reactive   a/r will need followup with ct in 2m and consider biopsy if persists   -- wheeze.  10/18 albuterol hf  10/18 atrovent hf   INFECTION.  PAST ID ISSUES   8/19/2022  zosyn Se Vignesh  8/19/2022 bactrim ds 2 bid   pmh 5/2/2022 SERRATIA CARBAPENEM RESISTANT PSEUDOMONAS  -- VAP 10/18/2022.  -- UTI 10/18/2022   w 10/18-10/19-10/20-10/21-10/23/2022 w 13 - 8.8 - 15-10.3 - 9.4- 12.3   pr 10/20/2022 6.6    ua 10/18/2022 w 11-25   ct ch 10/18/2022 cw 8/18/20232 ggo post upper lobes with interlobular septal thickening infiltrate or atelectasis lower lobes l more than   sm r and sm to mod l effsn  rvp 10/18/2022 (-)   uc 10/18 100K E coli   sp 10/19 Stenotrophomonas   bc 10/18 (-)   10/18/2022 meropenem Dr NEWTON  dc  10/18/2022 ID Dr Brantley on case   10/19/2022 levaquin 750   10/21/2022 rocephin x 7d Dr BRITTANY Brantley      CARDIAC.  -- Hypotension.  10/18/2022 88/48   echo 8/19/2022 n lvsf dd1 pasp 58   10/20 midocrine 10.3   Target MAP 65 (+)   resolvd   GI.  -- ELEVATED LFTS.  LFTS 10/18-10/19-10/20/2022   ap 140-131- 104  ast - 44  alt 21 - 103 - 64   monitor  HEMAT.  -- Anemia.  Hb 10/18-10/19-10/20-10/21-10/22-10/23/2022 Hb 7.4 -  6.3 - 8.2  - 7.4 - 8 - 8.7   inr 10/18/2022 inr 1.23   ctap 10/20 No rp bl  Monitor  target Hb 7 (+)   -- TRANSFUSION  10/19/2022 2 U PRBC     OVERALL ASSESSMENT/DISPOSITION.  76 f PMH trach peg cva cac anoxic encephalo PH 8/19/2022 pasp 58 bl pl effs  r thora 6/8/2022 and l thora 5/25/2022 were lp exudates  recurrent hospitalizations HO CR psuedomonas 5/2/2022 zosyn 8/19/2022 admitted 10/18/2022 with resp distress     VAP   UTI   uc 10/18 100K E coli   sp 10/19 Stenotrophomonas   10/19/2022 levaquin 750   10/21/2022 rocephin x 7d Dr BRITTANY Brantley   Vent mgmt   Hypotension  10/20 midocrine 10.3  Anemia  10/19/2022 2 U PRBC   Woody 10/23/2022      TIME SPENT   Over 39 minutes aggregate critical care time spent on encounter; activities included   direct patient care, counseling and/or coordinating care reviewing notes, lab data/ imaging , discussion with multidisciplinary team/ patient  /family and explaining in detail risks, benefits, alternatives  of the recommendations     CHAPINCITO GUIDRY 76 f [facility] S 10/18/2022   DR ANG PADGETT

## 2022-10-24 LAB
ALBUMIN SERPL ELPH-MCNC: 1.6 G/DL — LOW (ref 3.3–5)
ALP SERPL-CCNC: 104 U/L — SIGNIFICANT CHANGE UP (ref 30–120)
ALT FLD-CCNC: 18 U/L DA — SIGNIFICANT CHANGE UP (ref 10–60)
ANION GAP SERPL CALC-SCNC: 9 MMOL/L — SIGNIFICANT CHANGE UP (ref 5–17)
AST SERPL-CCNC: 12 U/L — SIGNIFICANT CHANGE UP (ref 10–40)
BASOPHILS # BLD AUTO: 0.03 K/UL — SIGNIFICANT CHANGE UP (ref 0–0.2)
BASOPHILS NFR BLD AUTO: 0.2 % — SIGNIFICANT CHANGE UP (ref 0–2)
BILIRUB SERPL-MCNC: 0.2 MG/DL — SIGNIFICANT CHANGE UP (ref 0.2–1.2)
BUN SERPL-MCNC: 41 MG/DL — HIGH (ref 7–23)
CALCIUM SERPL-MCNC: 8.5 MG/DL — SIGNIFICANT CHANGE UP (ref 8.4–10.5)
CHLORIDE SERPL-SCNC: 114 MMOL/L — HIGH (ref 96–108)
CO2 SERPL-SCNC: 23 MMOL/L — SIGNIFICANT CHANGE UP (ref 22–31)
CREAT SERPL-MCNC: 1.13 MG/DL — SIGNIFICANT CHANGE UP (ref 0.5–1.3)
EGFR: 50 ML/MIN/1.73M2 — LOW
EOSINOPHIL # BLD AUTO: 0.07 K/UL — SIGNIFICANT CHANGE UP (ref 0–0.5)
EOSINOPHIL NFR BLD AUTO: 0.5 % — SIGNIFICANT CHANGE UP (ref 0–6)
GLUCOSE BLDC GLUCOMTR-MCNC: 196 MG/DL — HIGH (ref 70–99)
GLUCOSE BLDC GLUCOMTR-MCNC: 220 MG/DL — HIGH (ref 70–99)
GLUCOSE BLDC GLUCOMTR-MCNC: 234 MG/DL — HIGH (ref 70–99)
GLUCOSE BLDC GLUCOMTR-MCNC: 247 MG/DL — HIGH (ref 70–99)
GLUCOSE BLDC GLUCOMTR-MCNC: 250 MG/DL — HIGH (ref 70–99)
GLUCOSE SERPL-MCNC: 230 MG/DL — HIGH (ref 70–99)
HCT VFR BLD CALC: 28.8 % — LOW (ref 34.5–45)
HGB BLD-MCNC: 8.4 G/DL — LOW (ref 11.5–15.5)
IMM GRANULOCYTES NFR BLD AUTO: 1.2 % — HIGH (ref 0–0.9)
LYMPHOCYTES # BLD AUTO: 1.39 K/UL — SIGNIFICANT CHANGE UP (ref 1–3.3)
LYMPHOCYTES # BLD AUTO: 10.7 % — LOW (ref 13–44)
MAGNESIUM SERPL-MCNC: 2.4 MG/DL — SIGNIFICANT CHANGE UP (ref 1.6–2.6)
MCHC RBC-ENTMCNC: 27.3 PG — SIGNIFICANT CHANGE UP (ref 27–34)
MCHC RBC-ENTMCNC: 29.2 GM/DL — LOW (ref 32–36)
MCV RBC AUTO: 93.5 FL — SIGNIFICANT CHANGE UP (ref 80–100)
MONOCYTES # BLD AUTO: 0.85 K/UL — SIGNIFICANT CHANGE UP (ref 0–0.9)
MONOCYTES NFR BLD AUTO: 6.5 % — SIGNIFICANT CHANGE UP (ref 2–14)
NEUTROPHILS # BLD AUTO: 10.55 K/UL — HIGH (ref 1.8–7.4)
NEUTROPHILS NFR BLD AUTO: 80.9 % — HIGH (ref 43–77)
NRBC # BLD: 0 /100 WBCS — SIGNIFICANT CHANGE UP (ref 0–0)
PHOSPHATE SERPL-MCNC: 3.6 MG/DL — SIGNIFICANT CHANGE UP (ref 2.5–4.5)
PLATELET # BLD AUTO: 224 K/UL — SIGNIFICANT CHANGE UP (ref 150–400)
POTASSIUM SERPL-MCNC: 3.7 MMOL/L — SIGNIFICANT CHANGE UP (ref 3.5–5.3)
POTASSIUM SERPL-SCNC: 3.7 MMOL/L — SIGNIFICANT CHANGE UP (ref 3.5–5.3)
PROT SERPL-MCNC: 6.4 G/DL — SIGNIFICANT CHANGE UP (ref 6–8.3)
RBC # BLD: 3.08 M/UL — LOW (ref 3.8–5.2)
RBC # FLD: 19 % — HIGH (ref 10.3–14.5)
SODIUM SERPL-SCNC: 146 MMOL/L — HIGH (ref 135–145)
WBC # BLD: 13.05 K/UL — HIGH (ref 3.8–10.5)
WBC # FLD AUTO: 13.05 K/UL — HIGH (ref 3.8–10.5)

## 2022-10-24 PROCEDURE — 99233 SBSQ HOSP IP/OBS HIGH 50: CPT

## 2022-10-24 RX ORDER — FENTANYL CITRATE 50 UG/ML
25 INJECTION INTRAVENOUS
Refills: 0 | Status: DISCONTINUED | OUTPATIENT
Start: 2022-10-24 | End: 2022-10-26

## 2022-10-24 RX ADMIN — Medication 1 PUFF(S): at 00:33

## 2022-10-24 RX ADMIN — Medication 1 MILLIGRAM(S): at 11:56

## 2022-10-24 RX ADMIN — Medication 1 MILLIGRAM(S): at 00:02

## 2022-10-24 RX ADMIN — ALBUTEROL 2 PUFF(S): 90 AEROSOL, METERED ORAL at 07:56

## 2022-10-24 RX ADMIN — CHLORHEXIDINE GLUCONATE 1 APPLICATION(S): 213 SOLUTION TOPICAL at 05:05

## 2022-10-24 RX ADMIN — Medication 1 PUFF(S): at 07:57

## 2022-10-24 RX ADMIN — ALBUTEROL 2 PUFF(S): 90 AEROSOL, METERED ORAL at 20:50

## 2022-10-24 RX ADMIN — Medication 1 PUFF(S): at 15:15

## 2022-10-24 RX ADMIN — MIDODRINE HYDROCHLORIDE 10 MILLIGRAM(S): 2.5 TABLET ORAL at 05:07

## 2022-10-24 RX ADMIN — Medication 4: at 17:38

## 2022-10-24 RX ADMIN — CEFTRIAXONE 100 MILLIGRAM(S): 500 INJECTION, POWDER, FOR SOLUTION INTRAMUSCULAR; INTRAVENOUS at 12:28

## 2022-10-24 RX ADMIN — Medication 1 MILLIGRAM(S): at 17:25

## 2022-10-24 RX ADMIN — Medication 1 APPLICATION(S): at 12:28

## 2022-10-24 RX ADMIN — SIMETHICONE 80 MILLIGRAM(S): 80 TABLET, CHEWABLE ORAL at 17:26

## 2022-10-24 RX ADMIN — Medication 2: at 00:03

## 2022-10-24 RX ADMIN — Medication 1 MILLIGRAM(S): at 23:28

## 2022-10-24 RX ADMIN — Medication 1 MILLIGRAM(S): at 05:07

## 2022-10-24 RX ADMIN — PANTOPRAZOLE SODIUM 40 MILLIGRAM(S): 20 TABLET, DELAYED RELEASE ORAL at 05:07

## 2022-10-24 RX ADMIN — FENTANYL CITRATE 25 MICROGRAM(S): 50 INJECTION INTRAVENOUS at 23:45

## 2022-10-24 RX ADMIN — MIDODRINE HYDROCHLORIDE 10 MILLIGRAM(S): 2.5 TABLET ORAL at 21:29

## 2022-10-24 RX ADMIN — Medication 1 PUFF(S): at 20:50

## 2022-10-24 RX ADMIN — Medication 4: at 12:02

## 2022-10-24 RX ADMIN — CHLORHEXIDINE GLUCONATE 1 APPLICATION(S): 213 SOLUTION TOPICAL at 17:27

## 2022-10-24 RX ADMIN — ALBUTEROL 2 PUFF(S): 90 AEROSOL, METERED ORAL at 15:15

## 2022-10-24 RX ADMIN — Medication 1 MILLIGRAM(S): at 03:57

## 2022-10-24 RX ADMIN — Medication 4: at 23:29

## 2022-10-24 RX ADMIN — HEPARIN SODIUM 5000 UNIT(S): 5000 INJECTION INTRAVENOUS; SUBCUTANEOUS at 05:06

## 2022-10-24 RX ADMIN — Medication 3 MILLIGRAM(S): at 21:29

## 2022-10-24 RX ADMIN — ALBUTEROL 2 PUFF(S): 90 AEROSOL, METERED ORAL at 00:33

## 2022-10-24 RX ADMIN — MIDODRINE HYDROCHLORIDE 10 MILLIGRAM(S): 2.5 TABLET ORAL at 13:30

## 2022-10-24 RX ADMIN — Medication 1 MILLIGRAM(S): at 21:29

## 2022-10-24 RX ADMIN — CHLORHEXIDINE GLUCONATE 15 MILLILITER(S): 213 SOLUTION TOPICAL at 05:05

## 2022-10-24 RX ADMIN — Medication 4: at 05:06

## 2022-10-24 RX ADMIN — SIMETHICONE 80 MILLIGRAM(S): 80 TABLET, CHEWABLE ORAL at 05:07

## 2022-10-24 RX ADMIN — HEPARIN SODIUM 5000 UNIT(S): 5000 INJECTION INTRAVENOUS; SUBCUTANEOUS at 17:26

## 2022-10-24 RX ADMIN — CHLORHEXIDINE GLUCONATE 15 MILLILITER(S): 213 SOLUTION TOPICAL at 17:25

## 2022-10-24 RX ADMIN — FENTANYL CITRATE 25 MICROGRAM(S): 50 INJECTION INTRAVENOUS at 23:32

## 2022-10-24 RX ADMIN — Medication 1 APPLICATION(S): at 11:56

## 2022-10-24 NOTE — PROGRESS NOTE ADULT - ASSESSMENT
REVIEW OF SYMPTOMS      Able to give (reliable) ROS  NO     PHYSICAL EXAM    HEENT Unremarkable  atraumatic   RESP Fair air entry EXP prolonged    Harsh breath sound Resp distres mild   CARDIAC S1 S2 No S3     NO JVD    ABDOMEN SOFT BS PRESENT NOT DISTENDED No hepatosplenomegaly   PEDAL EDEMA present No calf tenderness  NO rash       GENERAL DATA .   GOC.  10/18/2022 full code       ALLGY. codeine                            WT.          10/18/2022 48  BMI.          10/18/2022 17                     ICU STAY. 10/18/2022  COVID.  10/18/2022 scv2 (-)     BEST PRACTICE ISSUES.    HOB ELEVATN. Yes  DVT PPLX.   10/18/2022 hpsc    SQUIRES PPLX.  10/18/2022 protonix 40     INFN PPLX.    10/18/2022 ch;lorhexidine .12%  10/19/2022 chlorhexidine 2%    SP SW EM.        DIET.     10/19/2022 nepro 800 gt    VS/ PO/IO/ VENT/ DRIPS.  10/24/2022 70 114/60   10/24/2022 ac 16/400/5/.5       ASSESSMENT/RECOMMENDATIONS .   RESP.  -- Gas exchange.   10/18/2022 ac 18/400/100/5 751/37/257  -- VENT MANAGEMENT.   HOB elevation  Target Pplat 30 (-)  Target PO 90-95%  Target pH 730 (+)  Daily spontaneous breathing trials   Daily sedation vacation   -- Pleuralk effsn  Past ritchie   R THORAC   6/8/2022  g 161 l 222 p 4.9 p 10 l 16 .38    L THORAC   5/25/2022 g 183 l 144/381 .37 p 3.4/5.3 .64 p 23 l 36   5/25/2022l pl fluid  lymph pred exudate   cyto (-)     Ct  10/18/2022 sm r mod l effs large subpulmonic component   a/r No thoracentesis plannd at this point risk prohibitive in vent pt   -- Mediastinal lne.  Ct  10/18/2022 again enlarged mediastinal lns likely reactive   a/r will need followup with ct in 2m and consider biopsy if persists   -- wheeze.  10/18 albuterol hf  10/18 atrovent hf   INFECTION.  PAST ID ISSUES   8/19/2022  zosyn Se Vignesh  8/19/2022 bactrim ds 2 bid   pmh 5/2/2022 SERRATIA CARBAPENEM RESISTANT PSEUDOMONAS  -- VAP 10/18/2022.  -- UTI 10/18/2022   w 10/18-10/19-10/20-10/21-10/23/2022 w 13 - 8.8 - 15-10.3 - 9.4- 12.3   pr 10/20/2022 6.6    ua 10/18/2022 w 11-25   ct ch 10/18/2022 cw 8/18/20232 ggo post upper lobes with interlobular septal thickening infiltrate or atelectasis lower lobes l more than   sm r and sm to mod l effsn  rvp 10/18/2022 (-)   uc 10/18 100K E coli   sp 10/19 Stenotrophomonas   bc 10/18 (-)   10/18/2022 meropenem Dr NEWTON  dc  10/18/2022 ID Dr Brantley on case   10/19/2022 levaquin 750   10/21/2022 rocephin x 7d Dr BRITTANY Brantley      CARDIAC.  -- Hypotension.  10/18/2022 88/48   echo 8/19/2022 n lvsf dd1 pasp 58   10/20 midocrine 10.3   Target MAP 65 (+)   resolvd   GI.  -- ELEVATED LFTS.  LFTS 10/18-10/19-10/20/2022   ap 140-131- 104  ast - 44  alt 21 - 103 - 64   monitor  HEMAT.  -- Anemia.  Hb 10/18-10/19-10/20-10/21-10/22-10/23-10/24/2022 Hb 7.4 -  6.3 - 8.2  - 7.4 - 8 - 8.7 - 8.4   inr 10/18/2022 inr 1.23   ctap 10/20 No rp bl  Monitor  target Hb 7 (+)   -- TRANSFUSION  10/19/2022 2 U PRBC     OVERALL ASSESSMENT/DISPOSITION.  76 f PMH trach peg cva cac anoxic encephalo PH 8/19/2022 pasp 58 bl pl effs  r thora 6/8/2022 and l thora 5/25/2022 were lp exudates  recurrent hospitalizations HO CR psuedomonas 5/2/2022 zosyn 8/19/2022 admitted 10/18/2022 with resp distress     VAP   UTI   uc 10/18 100K E coli   sp 10/19 Stenotrophomonas   10/19/2022 levaquin 750   10/21/2022 janetephin x 7d Dr BRITTANY Brantley   Vent mgmt   Hypotension  10/20 midocrine 10.3  Anemia  10/19/2022 2 U PRBC   Woody 10/23/2022      TIME SPENT   Over 39 minutes aggregate critical care time spent on encounter; activities included   direct patient care, counseling and/or coordinating care reviewing notes, lab data/ imaging , discussion with multidisciplinary team/ patient  /family and explaining in detail risks, benefits, alternatives  of the recommendations     CHAPINCITO GUIDRY 76 f [facility] S 10/18/2022   DR ANG PADGETT

## 2022-10-24 NOTE — PROGRESS NOTE ADULT - ASSESSMENT
Pt is a 76W w/ PMHx of DM2, COPD, HTN, dementia, hx of subarachnoid hemorrhage, and history of chronic trach brought in by ambulance for evaluation of low CO2 and cyanotic appearance.   Well-known and has had multiple admissions for Acute on chronic RF and PNA w/ MDROs    Acute VAP/PNA  Acute on chronic HF  CAUTI/UTI  - pt p/w cyanosis  - most recent admission in end of August, most recent cx w/ Pseudomonas and Stenotrophomonas  - labs notable for leukocytosis to 13  - CT chest Groundglass opacities and interlobular septal thickening suggestive of CHF. There are also some more confluent areas of density   which may represent atelectasis versus infiltrates.Bilateral pleural effusions have decreased from the prior exam  - s/p levofloxacin in the ED  - UCx E.coli   - BCx NGTD  - sputum cx w/ Stenotrophomonas maltophilia  Plan:   C/w levofloxacin 750mg q48h  C/w ceftriaxone 1gm q24h for possible UTI/CAUTI  Trend temps and cbc--slight leukocytosis, will c/w monitoring  Isolation per infection control policy given hx of CRE  Supportive care/vent management per [facility]    Infectious Diseases will continue to follow. Please call with any questions.   Andressa Brantley M.D.  [facility] Division of Infectious Diseases [phone]

## 2022-10-24 NOTE — PROGRESS NOTE ADULT - SUBJECTIVE AND OBJECTIVE BOX
Patient is a 76y old  Female who presents with a chief complaint of Acute on Hypoxemic respiratory failure (24 Oct 2022 10:19)      INTERVAL HPI/OVERNIGHT EVENTS:    no overnight events     MEDICATIONS  (STANDING):  ALBUTerol    90 MICROgram(s) HFA Inhaler 2 Puff(s) Inhalation every 6 hours  cadexomer iodine 0.9% Gel 1 Application(s) Topical <User Schedule>  cefTRIAXone   IVPB 1000 milliGRAM(s) IV Intermittent every 24 hours  cefTRIAXone   IVPB      chlorhexidine 0.12% Liquid 15 milliLiter(s) Oral Mucosa every 12 hours  chlorhexidine 2% Cloths 1 Application(s) Topical two times a day  dexMEDEtomidine Infusion 0.5 MICROgram(s)/kG/Hr (6.14 mL/Hr) IV Continuous <Continuous>  dextrose 5%. 1000 milliLiter(s) (50 mL/Hr) IV Continuous <Continuous>  dextrose 5%. 1000 milliLiter(s) (100 mL/Hr) IV Continuous <Continuous>  dextrose 50% Injectable 25 Gram(s) IV Push once  dextrose 50% Injectable 12.5 Gram(s) IV Push once  dextrose 50% Injectable 25 Gram(s) IV Push once  glucagon  Injectable 1 milliGRAM(s) IntraMuscular once  heparin   Injectable 5000 Unit(s) SubCutaneous every 12 hours  insulin lispro (ADMELOG) corrective regimen sliding scale   SubCutaneous every 6 hours  ipratropium 17 MICROgram(s) HFA Inhaler 1 Puff(s) Inhalation every 6 hours  levoFLOXacin IVPB 750 milliGRAM(s) IV Intermittent every 48 hours  LORazepam     Tablet 1 milliGRAM(s) Oral every 6 hours  midodrine 10 milliGRAM(s) Oral every 8 hours  pantoprazole    Tablet 40 milliGRAM(s) Oral before breakfast  povidone iodine 10% Solution 1 Application(s) Topical daily  simethicone 80 milliGRAM(s) Chew every 12 hours    MEDICATIONS  (PRN):  acetaminophen     Tablet .. 650 milliGRAM(s) Oral every 6 hours PRN Temp greater or equal to 38C (100.4F), Mild Pain (1 - 3)  aluminum hydroxide/magnesium hydroxide/simethicone Suspension 30 milliLiter(s) Oral every 4 hours PRN Dyspepsia  dextrose Oral Gel 15 Gram(s) Oral once PRN Blood Glucose LESS THAN 70 milliGRAM(s)/deciliter  fentaNYL    Injectable 25 MICROGram(s) IV Push every 2 hours PRN Severe Pain (7 - 10)  LORazepam   Injectable 1 milliGRAM(s) IV Push every 4 hours PRN Agitation  melatonin 3 milliGRAM(s) Oral at bedtime PRN Insomnia  ondansetron Injectable 4 milliGRAM(s) IV Push every 8 hours PRN Nausea and/or Vomiting      Allergies    codeine (Hives)    Intolerances        REVIEW OF SYSTEMS:  unable to obtain    Vital Signs Last 24 Hrs  T(C): 37.1 (24 Oct 2022 05:00), Max: 37.9 (23 Oct 2022 11:02)  T(F): 98.8 (24 Oct 2022 05:00), Max: 100.2 (23 Oct 2022 11:02)  HR: 74 (24 Oct 2022 10:00) (69 - 115)  BP: 112/67 (24 Oct 2022 10:00) (88/55 - 183/79)  BP(mean): 81 (24 Oct 2022 10:00) (67 - 113)  RR: 29 (24 Oct 2022 10:00) (19 - 36)  SpO2: 100% (24 Oct 2022 10:00) (86% - 100%)    Parameters below as of 24 Oct 2022 10:00  Patient On (Oxygen Delivery Method): ventilator    O2 Concentration (%): 100    PHYSICAL EXAM:  General: NAD  HEENT: NC/AT, EOMI,   Neck: trach to vent  Lungs: MV breath sounds  Heart: Regular rate and rhythm. No murmur, rub or gallop.  Abdomen: Soft. Nondistended. Nontender. +PEG  Skin: Warm. Dry.    LABS:                        8.4    13.05 )-----------( 224      ( 24 Oct 2022 07:04 )             28.8     24 Oct 2022 07:04    146    |  114    |  41     ----------------------------<  230    3.7     |  23     |  1.13     Ca    8.5        24 Oct 2022 07:04  Phos  3.6       24 Oct 2022 07:04  Mg     2.4       24 Oct 2022 07:04    TPro  6.4    /  Alb  1.6    /  TBili  0.2    /  DBili  x      /  AST  12     /  ALT  18     /  AlkPhos  104    24 Oct 2022 07:04        CAPILLARY BLOOD GLUCOSE      POCT Blood Glucose.: 234 mg/dL (24 Oct 2022 04:55)  POCT Blood Glucose.: 196 mg/dL (24 Oct 2022 00:01)  POCT Blood Glucose.: 197 mg/dL (23 Oct 2022 17:31)  POCT Blood Glucose.: 190 mg/dL (23 Oct 2022 11:34)      RADIOLOGY & ADDITIONAL TESTS:

## 2022-10-24 NOTE — PROGRESS NOTE ADULT - ASSESSMENT
75 y/o female with pmhx of dementia, COPD, VDRF s/p trach peg, cardiac arrest, CVA, CKD, prior covid, nonverbal at baseline admitted on 10/18 for acute on chronic hypoxic respiratory failure due to pna with hospital course complicated by chronic anemia, RYAN, hyperkalemia, hyperglycemia, hyponatremia s/p 2 prbc. CT with bilateral moderate pleural effusions.    1. acute on chronic hypoxic respiratory failure  2. pleural effusion  3. RYAN, resolved  4. multifocal VAP  5. UTI      - precedex weaned off. utilize prn benzos if needed for sedation  - on levaquin and rocephin. stenotrophomonas in sputum blood NGTD and ecoli in urine  - lung protective ventilatory strategy at ~6 cc/kg IBW. actively titrating fio2 now lowered to 50%. if continues fail to ocnitnue to improve will need thoracentesis  - h/h stable

## 2022-10-24 NOTE — PROGRESS NOTE ADULT - SUBJECTIVE AND OBJECTIVE BOX
Date/Time Patient Seen:  		  Referring MD:   Data Reviewed	       Patient is a 76y old  Female who presents with a chief complaint of Acute on Hypoxemic respiratory failure (23 Oct 2022 19:44)      Subjective/HPI     PAST MEDICAL & SURGICAL HISTORY:  Dementia of frontal lobe type    Aphasic stroke    Diabetes mellitus    Respiratory failure    Hypertension    GERD (gastroesophageal reflux disease)    Constipation    Respiratory failure    CVA (cerebral vascular accident)    HTN (hypertension)    DM (diabetes mellitus)    Advanced dementia    COVID-19 virus detected    Quadriplegia    Pneumonia    Type II diabetes mellitus    Hx of appendectomy    Gastrostomy in place    Tracheostomy in place    Tracheostomy tube present    Feeding by G-tube          Medication list         MEDICATIONS  (STANDING):  ALBUTerol    90 MICROgram(s) HFA Inhaler 2 Puff(s) Inhalation every 6 hours  cadexomer iodine 0.9% Gel 1 Application(s) Topical <User Schedule>  cefTRIAXone   IVPB 1000 milliGRAM(s) IV Intermittent every 24 hours  cefTRIAXone   IVPB      chlorhexidine 0.12% Liquid 15 milliLiter(s) Oral Mucosa every 12 hours  chlorhexidine 2% Cloths 1 Application(s) Topical two times a day  dexMEDEtomidine Infusion 0.5 MICROgram(s)/kG/Hr (6.14 mL/Hr) IV Continuous <Continuous>  dextrose 5%. 1000 milliLiter(s) (50 mL/Hr) IV Continuous <Continuous>  dextrose 5%. 1000 milliLiter(s) (100 mL/Hr) IV Continuous <Continuous>  dextrose 50% Injectable 25 Gram(s) IV Push once  dextrose 50% Injectable 12.5 Gram(s) IV Push once  dextrose 50% Injectable 25 Gram(s) IV Push once  glucagon  Injectable 1 milliGRAM(s) IntraMuscular once  heparin   Injectable 5000 Unit(s) SubCutaneous every 12 hours  insulin lispro (ADMELOG) corrective regimen sliding scale   SubCutaneous every 6 hours  ipratropium 17 MICROgram(s) HFA Inhaler 1 Puff(s) Inhalation every 6 hours  levoFLOXacin IVPB 750 milliGRAM(s) IV Intermittent every 48 hours  LORazepam     Tablet 1 milliGRAM(s) Oral every 6 hours  midodrine 10 milliGRAM(s) Oral every 8 hours  pantoprazole    Tablet 40 milliGRAM(s) Oral before breakfast  povidone iodine 10% Solution 1 Application(s) Topical daily  simethicone 80 milliGRAM(s) Chew every 12 hours    MEDICATIONS  (PRN):  acetaminophen     Tablet .. 650 milliGRAM(s) Oral every 6 hours PRN Temp greater or equal to 38C (100.4F), Mild Pain (1 - 3)  aluminum hydroxide/magnesium hydroxide/simethicone Suspension 30 milliLiter(s) Oral every 4 hours PRN Dyspepsia  dextrose Oral Gel 15 Gram(s) Oral once PRN Blood Glucose LESS THAN 70 milliGRAM(s)/deciliter  fentaNYL    Injectable 25 MICROGram(s) IV Push every 2 hours PRN Severe Pain (7 - 10)  LORazepam   Injectable 1 milliGRAM(s) IV Push every 4 hours PRN Agitation  melatonin 3 milliGRAM(s) Oral at bedtime PRN Insomnia  ondansetron Injectable 4 milliGRAM(s) IV Push every 8 hours PRN Nausea and/or Vomiting         Vitals log        ICU Vital Signs Last 24 Hrs  T(C): 37.1 (24 Oct 2022 05:00), Max: 37.9 (23 Oct 2022 11:02)  T(F): 98.8 (24 Oct 2022 05:00), Max: 100.2 (23 Oct 2022 11:02)  HR: 77 (24 Oct 2022 06:00) (69 - 104)  BP: 124/71 (24 Oct 2022 06:00) (88/55 - 134/80)  BP(mean): 87 (24 Oct 2022 06:00) (67 - 113)  ABP: --  ABP(mean): --  RR: 26 (24 Oct 2022 06:00) (19 - 36)  SpO2: 99% (24 Oct 2022 06:00) (96% - 100%)    O2 Parameters below as of 24 Oct 2022 06:00  Patient On (Oxygen Delivery Method): ventilator    O2 Concentration (%): 45         Mode: AC/ CMV (Assist Control/ Continuous Mandatory Ventilation)  RR (machine): 16  TV (machine): 400  FiO2: 70  PEEP: 5  ITime: 1  MAP: 17  PIP: 31      Input and Output:  I&O's Detail    23 Oct 2022 07:01  -  24 Oct 2022 07:00  --------------------------------------------------------  IN:    Free Water: 750 mL    Nepro with Carb Steady: 840 mL  Total IN: 1590 mL    OUT:    Indwelling Catheter - Urethral (mL): 850 mL    Voided (mL): 350 mL  Total OUT: 1200 mL    Total NET: 390 mL          Lab Data                        8.7    12.38 )-----------( 283      ( 23 Oct 2022 06:00 )             29.5     10-23    146<H>  |  113<H>  |  57<H>  ----------------------------<  194<H>  4.0   |  24  |  1.25    Ca    7.8<L>      23 Oct 2022 06:00  Phos  3.8     10-23  Mg     2.6     10-23              Review of Systems	      Objective     Physical Examination    heart s1s2  lung dec BS  head nc      Pertinent Lab findings & Imaging      Annalise:  NO   Adequate UO     I&O's Detail    23 Oct 2022 07:01  -  24 Oct 2022 07:00  --------------------------------------------------------  IN:    Free Water: 750 mL    Nepro with Carb Steady: 840 mL  Total IN: 1590 mL    OUT:    Indwelling Catheter - Urethral (mL): 850 mL    Voided (mL): 350 mL  Total OUT: 1200 mL    Total NET: 390 mL               Discussed with:     Cultures:	        Radiology

## 2022-10-24 NOTE — PROGRESS NOTE ADULT - SUBJECTIVE AND OBJECTIVE BOX
Patient is a 76y old  Female who presents with a chief complaint of Acute on Hypoxemic respiratory failure (24 Oct 2022 15:47)      BRIEF HOSPITAL COURSE: 75 y/o female with pmhx of dementia, COPD, VDRF s/p trach peg, cardiac arrest, CVA, CKD, prior covid, nonverbal at baseline admitted on 10/18 for acute on chronic hypoxic respiratory failure due to pna with hospital course complicated by chronic anemia, RYAN, hyperkalemia, hyperglycemia, hyponatremia s/p 2 prbc. CT with bilateral moderate pleural effusions.    Events last 24 hours: on 60% fio2     PAST MEDICAL & SURGICAL HISTORY:  Dementia of frontal lobe type      Aphasic stroke      Diabetes mellitus      Respiratory failure      Hypertension      GERD (gastroesophageal reflux disease)      Constipation      Respiratory failure      CVA (cerebral vascular accident)      HTN (hypertension)      DM (diabetes mellitus)      Advanced dementia      COVID-19 virus detected      Quadriplegia      Pneumonia      Type II diabetes mellitus      Hx of appendectomy      Gastrostomy in place      Tracheostomy in place      Tracheostomy tube present      Feeding by G-tube          Review of Systems:  unable to obtain, nonverbal      Medications:  cefTRIAXone   IVPB 1000 milliGRAM(s) IV Intermittent every 24 hours  cefTRIAXone   IVPB      levoFLOXacin IVPB 750 milliGRAM(s) IV Intermittent every 48 hours    midodrine 10 milliGRAM(s) Oral every 8 hours    ALBUTerol    90 MICROgram(s) HFA Inhaler 2 Puff(s) Inhalation every 6 hours  ipratropium 17 MICROgram(s) HFA Inhaler 1 Puff(s) Inhalation every 6 hours    acetaminophen     Tablet .. 650 milliGRAM(s) Oral every 6 hours PRN  dexMEDEtomidine Infusion 0.5 MICROgram(s)/kG/Hr IV Continuous <Continuous>  fentaNYL    Injectable 25 MICROGram(s) IV Push every 2 hours PRN  LORazepam     Tablet 1 milliGRAM(s) Oral every 6 hours  LORazepam   Injectable 1 milliGRAM(s) IV Push every 4 hours PRN  melatonin 3 milliGRAM(s) Oral at bedtime PRN  ondansetron Injectable 4 milliGRAM(s) IV Push every 8 hours PRN      heparin   Injectable 5000 Unit(s) SubCutaneous every 12 hours    aluminum hydroxide/magnesium hydroxide/simethicone Suspension 30 milliLiter(s) Oral every 4 hours PRN  pantoprazole    Tablet 40 milliGRAM(s) Oral before breakfast  simethicone 80 milliGRAM(s) Chew every 12 hours      dextrose 50% Injectable 25 Gram(s) IV Push once  dextrose 50% Injectable 12.5 Gram(s) IV Push once  dextrose 50% Injectable 25 Gram(s) IV Push once  dextrose Oral Gel 15 Gram(s) Oral once PRN  glucagon  Injectable 1 milliGRAM(s) IntraMuscular once  insulin lispro (ADMELOG) corrective regimen sliding scale   SubCutaneous every 6 hours    dextrose 5%. 1000 milliLiter(s) IV Continuous <Continuous>  dextrose 5%. 1000 milliLiter(s) IV Continuous <Continuous>      cadexomer iodine 0.9% Gel 1 Application(s) Topical <User Schedule>  chlorhexidine 0.12% Liquid 15 milliLiter(s) Oral Mucosa every 12 hours  chlorhexidine 2% Cloths 1 Application(s) Topical two times a day  povidone iodine 10% Solution 1 Application(s) Topical daily        Mode: AC/ CMV (Assist Control/ Continuous Mandatory Ventilation)  RR (machine): 16  TV (machine): 400  FiO2: 50  PEEP: 5  ITime: 1  MAP: 10  PIP: 32      ICU Vital Signs Last 24 Hrs  T(C): 37.1 (24 Oct 2022 05:00), Max: 37.7 (23 Oct 2022 21:00)  T(F): 98.8 (24 Oct 2022 05:00), Max: 99.8 (23 Oct 2022 21:00)  HR: 79 (24 Oct 2022 20:00) (69 - 115)  BP: 121/67 (24 Oct 2022 20:00) (88/55 - 183/79)  BP(mean): 83 (24 Oct 2022 20:00) (67 - 113)  ABP: --  ABP(mean): --  RR: 27 (24 Oct 2022 20:00) (19 - 35)  SpO2: 100% (24 Oct 2022 20:00) (84% - 100%)    O2 Parameters below as of 24 Oct 2022 20:00  Patient On (Oxygen Delivery Method): ventilator                I&O's Detail    23 Oct 2022 07:01  -  24 Oct 2022 07:00  --------------------------------------------------------  IN:    Free Water: 750 mL    Nepro with Carb Steady: 840 mL  Total IN: 1590 mL    OUT:    Indwelling Catheter - Urethral (mL): 850 mL    Voided (mL): 350 mL  Total OUT: 1200 mL    Total NET: 390 mL      24 Oct 2022 07:01  -  24 Oct 2022 20:43  --------------------------------------------------------  IN:    Free Water: 500 mL    Nepro with Carb Steady: 480 mL  Total IN: 980 mL    OUT:    Indwelling Catheter - Urethral (mL): 500 mL  Total OUT: 500 mL    Total NET: 480 mL            LABS:                        8.4    13.05 )-----------( 224      ( 24 Oct 2022 07:04 )             28.8     10-24    146<H>  |  114<H>  |  41<H>  ----------------------------<  230<H>  3.7   |  23  |  1.13    Ca    8.5      24 Oct 2022 07:04  Phos  3.6     10-24  Mg     2.4     10-24    TPro  6.4  /  Alb  1.6<L>  /  TBili  0.2  /  DBili  x   /  AST  12  /  ALT  18  /  AlkPhos  104  10-24          CAPILLARY BLOOD GLUCOSE      POCT Blood Glucose.: 220 mg/dL (24 Oct 2022 17:30)        CULTURES:  Culture Results:   Moderate Mixed gram negative rods including  Moderate Stenotrophomonas maltophilia (10-19 @ 00:58)  Culture Results:   >100,000 CFU/ml Escherichia coli (10-18 @ 12:27)  Culture Results:   No Growth Final (10-18 @ 12:00)  Culture Results:   No Growth Final (10-18 @ 12:00)  Rapid RVP Result: NotDetec (10-18 @ 11:30)      Physical Examination:    General: No acute distress.      HEENT: Pupils equal, reactive to light.  Symmetric.    PULM: trach to vent. Clear to auscultation bilaterally, no significant sputum production    NECK: Supple, no lymphadenopathy, trachea midline    CVS: Regular rate and rhythm, no murmurs, rubs, or gallops    ABD: Soft, nondistended, nontender, normoactive bowel sounds, no masses    EXT: No edema, nontender    SKIN: Warm and well perfused, no rashes noted.    NEURO: nonverbal .does not follow commands    RADIOLOGY:     IMPRESSION:    No retroperitoneal hemorrhage.    Stable moderate bilateral pleural effusions with underlying compressive   atelectasis.    Small ascites.    Mildly distended rectum with liquid stool.        A preliminary report was provided by Dr. José Magaña. I agree with   the interpretation.    --- End of Report ---            ALISON ANDERSON MD; Attending Radiologist  This document has been electronically signed. Oct 21 2022  9:57AM    IMPRESSION: Groundglass opacities and interlobular septal thickening   suggestive of CHF. There are also some more confluent areas of density   which may represent atelectasis versus infiltrates.  Bilateral pleural effusions have decreased from the prior exam  Tracheostomy and gastrostomy    --- End of Report ---            MINOR STEWART MD; Attending Radiologist  This document has been electronically signed. Oct 18 2022  2:45PM      CRITICAL CARE TIME SPENT: 33 minutes of critical care time spent providing medical care for patient's acute illness/conditions that impairs at least one vital organ system and/or poses a high risk of imminent or life threatening deterioration in the patient's condition. It includes time spent evaluating and treating the patient's acute illness as well as time spent reviewing labs, radiology, discussing goals of care with patient and/or patient's family, and discussing the case with a multidisciplinary team in an effort to prevent further life threatening deterioration or end organ damage. This time is independent of any procedures performed.

## 2022-10-24 NOTE — PROGRESS NOTE ADULT - ASSESSMENT
76F with dementia, COPD with chronic resp failure and is vent dependent, s/p PEG, hx of cardiac arrest, CHF, cor pulmonale, CVA, COVID-19 infxn, CKD, anemia, multiple admissions    dementia  COPD  chr resp failure  vegetative state  dysphagia  peg  hx of card arrest - anoxic brain  CHF  cva      on emp ABX  CCM notes reviewed  vs noted  labs reviewed  Wound care in progress -   GI consult noted    Diagnosis; Prognosis; MOLST Discussed  Marciano -   HCP  pt is full code  spoke with  -

## 2022-10-24 NOTE — PROGRESS NOTE ADULT - SUBJECTIVE AND OBJECTIVE BOX
Chief Complaint: Hypoxia    Interval Events: No events overnight.    Review of Systems:  Unable to obtain    Physical Exam:  Vital Signs Last 24 Hrs  T(C): 37.1 (24 Oct 2022 05:00), Max: 37.9 (23 Oct 2022 11:02)  T(F): 98.8 (24 Oct 2022 05:00), Max: 100.2 (23 Oct 2022 11:02)  HR: 75 (24 Oct 2022 08:21) (69 - 115)  BP: 183/79 (24 Oct 2022 08:00) (88/55 - 183/79)  BP(mean): 109 (24 Oct 2022 08:00) (67 - 113)  RR: 26 (24 Oct 2022 08:00) (19 - 36)  SpO2: 98% (24 Oct 2022 08:21) (86% - 100%)  Parameters below as of 24 Oct 2022 08:00  Patient On (Oxygen Delivery Method): ventilator  O2 Concentration (%): 100  General: Unresponsive  HEENT: Trach  Neck: No JVD, no carotid bruit  Lungs: CTAB  CV: RRR, nl S1/S2, no M/R/G  Abdomen: S/NT/ND, +BS  Extremities: No LE edema, no cyanosis  Neuro: AAOx0  Skin: No rash    Labs:    10-24    146<H>  |  114<H>  |  41<H>  ----------------------------<  230<H>  3.7   |  23  |  1.13    Ca    8.5      24 Oct 2022 07:04  Phos  3.6     10-24  Mg     2.4     10-24    TPro  6.4  /  Alb  1.6<L>  /  TBili  0.2  /  DBili  x   /  AST  12  /  ALT  18  /  AlkPhos  104  10-24                        8.4    13.05 )-----------( 224      ( 24 Oct 2022 07:04 )             28.8       Telemetry: Sinus rhythm

## 2022-10-24 NOTE — PROGRESS NOTE ADULT - SUBJECTIVE AND OBJECTIVE BOX
INTERVAL HPI/OVERNIGHT EVENTS:  events noted  MEDICATIONS  (STANDING):  ALBUTerol    90 MICROgram(s) HFA Inhaler 2 Puff(s) Inhalation every 6 hours  cadexomer iodine 0.9% Gel 1 Application(s) Topical <User Schedule>  cefTRIAXone   IVPB 1000 milliGRAM(s) IV Intermittent every 24 hours  cefTRIAXone   IVPB      chlorhexidine 0.12% Liquid 15 milliLiter(s) Oral Mucosa every 12 hours  chlorhexidine 2% Cloths 1 Application(s) Topical two times a day  dexMEDEtomidine Infusion 0.5 MICROgram(s)/kG/Hr (6.14 mL/Hr) IV Continuous <Continuous>  dextrose 5%. 1000 milliLiter(s) (50 mL/Hr) IV Continuous <Continuous>  dextrose 5%. 1000 milliLiter(s) (100 mL/Hr) IV Continuous <Continuous>  dextrose 50% Injectable 25 Gram(s) IV Push once  dextrose 50% Injectable 12.5 Gram(s) IV Push once  dextrose 50% Injectable 25 Gram(s) IV Push once  glucagon  Injectable 1 milliGRAM(s) IntraMuscular once  heparin   Injectable 5000 Unit(s) SubCutaneous every 12 hours  insulin lispro (ADMELOG) corrective regimen sliding scale   SubCutaneous every 6 hours  ipratropium 17 MICROgram(s) HFA Inhaler 1 Puff(s) Inhalation every 6 hours  levoFLOXacin IVPB 750 milliGRAM(s) IV Intermittent every 48 hours  LORazepam     Tablet 1 milliGRAM(s) Oral every 6 hours  midodrine 10 milliGRAM(s) Oral every 8 hours  pantoprazole    Tablet 40 milliGRAM(s) Oral before breakfast  povidone iodine 10% Solution 1 Application(s) Topical daily  simethicone 80 milliGRAM(s) Chew every 12 hours    MEDICATIONS  (PRN):  acetaminophen     Tablet .. 650 milliGRAM(s) Oral every 6 hours PRN Temp greater or equal to 38C (100.4F), Mild Pain (1 - 3)  aluminum hydroxide/magnesium hydroxide/simethicone Suspension 30 milliLiter(s) Oral every 4 hours PRN Dyspepsia  dextrose Oral Gel 15 Gram(s) Oral once PRN Blood Glucose LESS THAN 70 milliGRAM(s)/deciliter  fentaNYL    Injectable 25 MICROGram(s) IV Push every 2 hours PRN Severe Pain (7 - 10)  LORazepam   Injectable 1 milliGRAM(s) IV Push every 4 hours PRN Agitation  melatonin 3 milliGRAM(s) Oral at bedtime PRN Insomnia  ondansetron Injectable 4 milliGRAM(s) IV Push every 8 hours PRN Nausea and/or Vomiting      Allergies    codeine (Hives)    Intolerances        Review of Systems:    General:  No wt loss, fevers, chills, night sweats,fatigue,   Eyes:  Good vision, no reported pain  ENT:  No sore throat, pain, runny nose, dysphagia  CV:  No pain, palpitatioins, hypo/hypertension  Resp:  No dyspnea, cough, tachypnea, wheezing  GI:  No pain, No nausea, No vomiting, No diarrhea, No constipatiion, No weight loss, No fever, No pruritis, No rectal bleeding, No tarry stools, No dysphagia,  :  No pain, bleeding, incontinence, nocturia  Muscle:  No pain, weakness  Neuro:  No weakness, tingling, memory problems  Psych:  No fatigue, insomnia, mood problems, depression  Endocrine:  No polyuria, polydypsia, cold/heat intolerance  Heme:  No petechiae, ecchymosis, easy bruisability  Skin:  No rash, tattoos, scars, edema      Vital Signs Last 24 Hrs  T(C): 37.1 (24 Oct 2022 05:00), Max: 37.7 (23 Oct 2022 21:00)  T(F): 98.8 (24 Oct 2022 05:00), Max: 99.8 (23 Oct 2022 21:00)  HR: 85 (24 Oct 2022 15:30) (69 - 115)  BP: 114/66 (24 Oct 2022 15:00) (88/55 - 183/79)  BP(mean): 82 (24 Oct 2022 15:00) (67 - 113)  RR: 27 (24 Oct 2022 15:00) (19 - 35)  SpO2: 100% (24 Oct 2022 15:00) (84% - 100%)    Parameters below as of 24 Oct 2022 15:30  Patient On (Oxygen Delivery Method): ventilator        PHYSICAL EXAM:    Constitutional: NAD, well-developed  HEENT: EOMI, throat clear  Neck: No LAD, supple  Respiratory: CTA and P  Cardiovascular: S1 and S2, RRR, no M  Gastrointestinal: BS+, soft, NT/ND, neg HSM,  Extremities: No peripheral edema, neg clubing, cyanosis  Vascular: 2+ peripheral pulses  Neurological: A/O x 3, no focal deficits  Psychiatric: Normal mood, normal affect  Skin: No rashes      LABS:                        8.4    13.05 )-----------( 224      ( 24 Oct 2022 07:04 )             28.8     10-24    146<H>  |  114<H>  |  41<H>  ----------------------------<  230<H>  3.7   |  23  |  1.13    Ca    8.5      24 Oct 2022 07:04  Phos  3.6     10-24  Mg     2.4     10-24    TPro  6.4  /  Alb  1.6<L>  /  TBili  0.2  /  DBili  x   /  AST  12  /  ALT  18  /  AlkPhos  104  10-24          RADIOLOGY & ADDITIONAL TESTS:

## 2022-10-24 NOTE — PROGRESS NOTE ADULT - ASSESSMENT
75yo F with PMH of dementia, COPD with chronic resp failure and is vent dependent, s/p PEG, hx of cardiac arrest, diastolic CHF, cor pulmonale, hx of CVA, COVID-19 infxn, CKD, anemia, multiple admissions for respiratory distress (recent admission from 6/1-6/9 diagnosed with PNA with parapneumonic pleural effusion s/p thoracentesis and Avycaz) who presents from [facility] for respiratory distress again a/w sepsis and respiratory failure due to suspected recurrent gram-negative PNA.    Severe sepsis and acute hypoxic respiratory failure 2/2 gram-negative PNA   - continue current vent settings (on AC with RR 16, , PEEP 5, FiO2 100%) maintain O2 sat >92%  - was on ertapenem,  zosyn+bactrim , now on levaquin  - lactate downtrending, procalcitonin high but improving with treatment (4.76 -> 3.34 -> 1.77)  - cautious use of fluids given hx of CHF (received 1750cc of NS in ED)  - f/u blood cultures (NGTD), urine culture pending   - f/u trach sputum culture - has GNR growing awaiting speciation / sensitivities  - has hx of respiratory distress driven by vent asynchrony and would require IV benzos ; trialed IV fentanyl with adequate effect this evening  - Checked CT Abd/P to eval for any other potential source sign of infection and found no significant sign of intra-abdominal infection on CT  - cc/pulm consult (Jaime), recs appreciated  - palliative care (Emre), recs appreciated - pt is full code  C/w levofloxacin 750mg q48h based on most recent cx per ID recs  ID added ceftriaxone 1gm q24h for possible UTI/CAUTI  WBC worsening today, no overnight events    Diastolic CHF  Hx of Pleural effusions  - last TTE was 5/30 with EF 65% with normal LVSF, dilated   RV, and moderate pHTN -> repeat TTE appears unchanged  - may need repeat thoracentesis (had 1.5L drained three admissions ago), pulmonology consulted; was parapneumonic on that admission as opposed to transudative and thus less likely related to CHF    hyperkalemia RYAN likely due to sepsis  will give bolus of fluids: careful use going forward.  repeat Cr and K    Anemia of chronic disease  - has been evaluated by GI and hematology in the past -> dx with ACD with intermittent GI bleeding  - s/p 2 unit PRBC   - current HB stable    Hyperkalemia  - given both insulin and lasix   still remains elevated  nutrition consulted    Hx of CKD stage 3 - near baseline of 1.2-1.3  - renally dose medications and monitor BMP and electrolytes   - Cr is 1.3 with eGFR of 41, but given pt has low muscle mass from being bedbound for years, this GFR estimate is likely falsely high  - consider nephro eval   nutrition consulted    HTN  - not on any BP meds at facility  - monitor VS    DM2  - NPO with TF  - continue with insulin 8units qAM as per last admission  - c/w moderate insulin coverage scale  - goal -180      Diet  - TF on hold 2/2 leak around site, will need GI to eval before restarting    Preventive measures  - cont with heparin subq for DVT ppx  - Full Code

## 2022-10-24 NOTE — PROGRESS NOTE ADULT - SUBJECTIVE AND OBJECTIVE BOX
[facility], Division of Infectious Diseases  [facility] Y. Patel, S. [facility], [facility]  [phone]    Name: BREA BECKHAM  Age: 76y  Gender: Female  MRN: [id number]    Interval History:  Patient seen and examined at bedside in SPCU  No acute overnight events. Afebrile  Continues to remain vented  Notes reviewed    Antibiotics:  cefTRIAXone   IVPB 1000 milliGRAM(s) IV Intermittent every 24 hours  cefTRIAXone   IVPB      levoFLOXacin IVPB 750 milliGRAM(s) IV Intermittent every 48 hours      Medications:  acetaminophen     Tablet .. 650 milliGRAM(s) Oral every 6 hours PRN  ALBUTerol    90 MICROgram(s) HFA Inhaler 2 Puff(s) Inhalation every 6 hours  aluminum hydroxide/magnesium hydroxide/simethicone Suspension 30 milliLiter(s) Oral every 4 hours PRN  cadexomer iodine 0.9% Gel 1 Application(s) Topical <User Schedule>  cefTRIAXone   IVPB 1000 milliGRAM(s) IV Intermittent every 24 hours  cefTRIAXone   IVPB      chlorhexidine 0.12% Liquid 15 milliLiter(s) Oral Mucosa every 12 hours  chlorhexidine 2% Cloths 1 Application(s) Topical two times a day  dexMEDEtomidine Infusion 0.5 MICROgram(s)/kG/Hr IV Continuous <Continuous>  dextrose 5%. 1000 milliLiter(s) IV Continuous <Continuous>  dextrose 5%. 1000 milliLiter(s) IV Continuous <Continuous>  dextrose 50% Injectable 25 Gram(s) IV Push once  dextrose 50% Injectable 12.5 Gram(s) IV Push once  dextrose 50% Injectable 25 Gram(s) IV Push once  dextrose Oral Gel 15 Gram(s) Oral once PRN  fentaNYL    Injectable 25 MICROGram(s) IV Push every 2 hours PRN  glucagon  Injectable 1 milliGRAM(s) IntraMuscular once  heparin   Injectable 5000 Unit(s) SubCutaneous every 12 hours  insulin lispro (ADMELOG) corrective regimen sliding scale   SubCutaneous every 6 hours  ipratropium 17 MICROgram(s) HFA Inhaler 1 Puff(s) Inhalation every 6 hours  levoFLOXacin IVPB 750 milliGRAM(s) IV Intermittent every 48 hours  LORazepam     Tablet 1 milliGRAM(s) Oral every 6 hours  LORazepam   Injectable 1 milliGRAM(s) IV Push every 4 hours PRN  melatonin 3 milliGRAM(s) Oral at bedtime PRN  midodrine 10 milliGRAM(s) Oral every 8 hours  ondansetron Injectable 4 milliGRAM(s) IV Push every 8 hours PRN  pantoprazole    Tablet 40 milliGRAM(s) Oral before breakfast  povidone iodine 10% Solution 1 Application(s) Topical daily  simethicone 80 milliGRAM(s) Chew every 12 hours      Review of Systems:  unable to obtain    Allergies: codeine (Hives)    For details regarding the patient's past medical history, social history, family history, and other miscellaneous elements, please refer the initial infectious diseases consultation and/or the admitting history and physical examination for this admission.    Objective:  Vitals:   T(C): 37.1 (10-24-22 @ 05:00), Max: 37.7 (10-23-22 @ 21:00)  HR: 77 (10-24-22 @ 12:00) (69 - 115)  BP: 112/59 (10-24-22 @ 12:00) (88/55 - 183/79)  RR: 21 (10-24-22 @ 12:00) (19 - 36)  SpO2: 100% (10-24-22 @ 12:00) (86% - 100%)    Physical Examination:  General: NAD  HEENT: NC/AT, EOMI,   Neck: trach to vent  Lungs: MV breath sounds  Heart: Regular rate and rhythm. No murmur, rub or gallop.  Abdomen: Soft. Nondistended. Nontender.  Skin: Warm. Dry.      Laboratory Studies:  CBC:                       8.4    13.05 )-----------( 224      ( 24 Oct 2022 07:04 )             28.8     CMP: 10-24    146<H>  |  114<H>  |  41<H>  ----------------------------<  230<H>  3.7   |  23  |  1.13    Ca    8.5      24 Oct 2022 07:04  Phos  3.6     10-24  Mg     2.4     10-24    TPro  6.4  /  Alb  1.6<L>  /  TBili  0.2  /  DBili  x   /  AST  12  /  ALT  18  /  AlkPhos  104  10-24    LIVER FUNCTIONS - ( 24 Oct 2022 07:04 )  Alb: 1.6 g/dL / Pro: 6.4 g/dL / ALK PHOS: 104 U/L / ALT: 18 U/L DA / AST: 12 U/L / GGT: x               Microbiology: reviewed    Culture - Sputum (collected 10-19-22 @ 00:58)  Source: .Sputum Sputum trap  Gram Stain (10-19-22 @ 19:24):    Few Squamous epithelial cells per low power field    Few polymorphonuclear leukocytes per low power field    Few Gram positive cocci in pairs per oil power field  Final Report (10-22-22 @ 17:55):    Moderate Mixed gram negative rods including    Moderate Stenotrophomonas maltophilia  Organism: Stenotrophomonas maltophilia (10-22-22 @ 17:56)      -  Minocycline: S [id number]      Method Type: KB  Organism: Stenotrophomonas maltophilia (10-22-22 @ 17:56)      -  Levofloxacin: S 2      -  Trimethoprim/Sulfamethoxazole: S <=0.5/9.5      Method Type: PRATIK  Organism: Stenotrophomonas maltophilia (10-22-22 @ 17:55)    Culture - Urine (collected 10-18-22 @ 12:27)  Source: Clean Catch Clean Catch (Midstream)  Final Report (10-21-22 @ 08:18):    >100,000 CFU/ml Escherichia coli  Organism: Escherichia coli (10-21-22 @ 08:18)  Organism: Escherichia coli (10-21-22 @ 08:18)      -  Amikacin: S <=16      -  Amoxicillin/Clavulanic Acid: S <=8/4      -  Ampicillin: R >16 These ampicillin results predict results for amoxicillin      -  Ampicillin/Sulbactam: I 16/8 Enterobacter, Klebsiella aerogenes, Citrobacter, and Serratia may develop resistance during prolonged therapy (3-4 days)      -  Aztreonam: S <=4      -  Cefazolin: S <=2 For uncomplicated UTI with K. pneumoniae, E. coli, or P. mirablis: PRATIK <=16 is sensitive and PRATIK >=32 is resistant. This also predicts results for oral agents cefaclor, cefdinir, cefpodoxime, cefprozil, cefuroxime axetil, cephalexin and locarbef for uncomplicated UTI. Note that some isolates may be susceptible to these agents while testing resistant to cefazolin.      -  Cefepime: S <=2      -  Cefoxitin: S <=8      -  Ceftriaxone: S <=1 Enterobacter, Klebsiella aerogenes, Citrobacter, and Serratia may develop resistance during prolonged therapy      -  Ciprofloxacin: R >2      -  Ertapenem: I 1      -  Gentamicin: R >8      -  Imipenem: S <=1      -  Levofloxacin: R >4      -  Meropenem: S <=1      -  Nitrofurantoin: S <=32 Should not be used to treat pyelonephritis      -  Piperacillin/Tazobactam: S <=8      -  Tigecycline: S <=2      -  Tobramycin: I 8      -  Trimethoprim/Sulfamethoxazole: R >2/38      Method Type: PRATIK    Culture - Blood (collected 10-18-22 @ 12:00)  Source: .Blood Blood-Peripheral  Final Report (10-23-22 @ 17:00):    No Growth Final    Culture - Blood (collected 10-18-22 @ 12:00)  Source: .Blood Blood-Peripheral  Final Report (10-23-22 @ 17:00):    No Growth Final          Radiology: reviewed

## 2022-10-25 LAB
ALBUMIN SERPL ELPH-MCNC: 1.7 G/DL — LOW (ref 3.3–5)
ALP SERPL-CCNC: 104 U/L — SIGNIFICANT CHANGE UP (ref 30–120)
ALT FLD-CCNC: 25 U/L DA — SIGNIFICANT CHANGE UP (ref 10–60)
ANION GAP SERPL CALC-SCNC: 10 MMOL/L — SIGNIFICANT CHANGE UP (ref 5–17)
AST SERPL-CCNC: 14 U/L — SIGNIFICANT CHANGE UP (ref 10–40)
BASOPHILS # BLD AUTO: 0.03 K/UL — SIGNIFICANT CHANGE UP (ref 0–0.2)
BASOPHILS NFR BLD AUTO: 0.2 % — SIGNIFICANT CHANGE UP (ref 0–2)
BILIRUB SERPL-MCNC: 0.4 MG/DL — SIGNIFICANT CHANGE UP (ref 0.2–1.2)
BUN SERPL-MCNC: 32 MG/DL — HIGH (ref 7–23)
CALCIUM SERPL-MCNC: 8.7 MG/DL — SIGNIFICANT CHANGE UP (ref 8.4–10.5)
CHLORIDE SERPL-SCNC: 113 MMOL/L — HIGH (ref 96–108)
CO2 SERPL-SCNC: 23 MMOL/L — SIGNIFICANT CHANGE UP (ref 22–31)
CREAT SERPL-MCNC: 1.01 MG/DL — SIGNIFICANT CHANGE UP (ref 0.5–1.3)
EGFR: 57 ML/MIN/1.73M2 — LOW
EOSINOPHIL # BLD AUTO: 0.17 K/UL — SIGNIFICANT CHANGE UP (ref 0–0.5)
EOSINOPHIL NFR BLD AUTO: 1.1 % — SIGNIFICANT CHANGE UP (ref 0–6)
GLUCOSE BLDC GLUCOMTR-MCNC: 170 MG/DL — HIGH (ref 70–99)
GLUCOSE BLDC GLUCOMTR-MCNC: 171 MG/DL — HIGH (ref 70–99)
GLUCOSE BLDC GLUCOMTR-MCNC: 181 MG/DL — HIGH (ref 70–99)
GLUCOSE BLDC GLUCOMTR-MCNC: 229 MG/DL — HIGH (ref 70–99)
GLUCOSE SERPL-MCNC: 173 MG/DL — HIGH (ref 70–99)
HCT VFR BLD CALC: 29.2 % — LOW (ref 34.5–45)
HGB BLD-MCNC: 8.6 G/DL — LOW (ref 11.5–15.5)
IMM GRANULOCYTES NFR BLD AUTO: 1.5 % — HIGH (ref 0–0.9)
LYMPHOCYTES # BLD AUTO: 18.9 % — SIGNIFICANT CHANGE UP (ref 13–44)
LYMPHOCYTES # BLD AUTO: 2.95 K/UL — SIGNIFICANT CHANGE UP (ref 1–3.3)
MAGNESIUM SERPL-MCNC: 2.1 MG/DL — SIGNIFICANT CHANGE UP (ref 1.6–2.6)
MCHC RBC-ENTMCNC: 27.7 PG — SIGNIFICANT CHANGE UP (ref 27–34)
MCHC RBC-ENTMCNC: 29.5 GM/DL — LOW (ref 32–36)
MCV RBC AUTO: 93.9 FL — SIGNIFICANT CHANGE UP (ref 80–100)
MONOCYTES # BLD AUTO: 1.08 K/UL — HIGH (ref 0–0.9)
MONOCYTES NFR BLD AUTO: 6.9 % — SIGNIFICANT CHANGE UP (ref 2–14)
NEUTROPHILS # BLD AUTO: 11.17 K/UL — HIGH (ref 1.8–7.4)
NEUTROPHILS NFR BLD AUTO: 71.4 % — SIGNIFICANT CHANGE UP (ref 43–77)
NRBC # BLD: 0 /100 WBCS — SIGNIFICANT CHANGE UP (ref 0–0)
PHOSPHATE SERPL-MCNC: 4 MG/DL — SIGNIFICANT CHANGE UP (ref 2.5–4.5)
PLATELET # BLD AUTO: 210 K/UL — SIGNIFICANT CHANGE UP (ref 150–400)
POTASSIUM SERPL-MCNC: 3.8 MMOL/L — SIGNIFICANT CHANGE UP (ref 3.5–5.3)
POTASSIUM SERPL-SCNC: 3.8 MMOL/L — SIGNIFICANT CHANGE UP (ref 3.5–5.3)
PROT SERPL-MCNC: 6.1 G/DL — SIGNIFICANT CHANGE UP (ref 6–8.3)
RBC # BLD: 3.11 M/UL — LOW (ref 3.8–5.2)
RBC # FLD: 19.4 % — HIGH (ref 10.3–14.5)
SARS-COV-2 RNA SPEC QL NAA+PROBE: SIGNIFICANT CHANGE UP
SODIUM SERPL-SCNC: 146 MMOL/L — HIGH (ref 135–145)
WBC # BLD: 15.63 K/UL — HIGH (ref 3.8–10.5)
WBC # FLD AUTO: 15.63 K/UL — HIGH (ref 3.8–10.5)

## 2022-10-25 PROCEDURE — 99233 SBSQ HOSP IP/OBS HIGH 50: CPT

## 2022-10-25 RX ADMIN — Medication 2: at 05:08

## 2022-10-25 RX ADMIN — Medication 1 MILLIGRAM(S): at 11:44

## 2022-10-25 RX ADMIN — Medication 1 MILLIGRAM(S): at 17:25

## 2022-10-25 RX ADMIN — HEPARIN SODIUM 5000 UNIT(S): 5000 INJECTION INTRAVENOUS; SUBCUTANEOUS at 05:07

## 2022-10-25 RX ADMIN — SIMETHICONE 80 MILLIGRAM(S): 80 TABLET, CHEWABLE ORAL at 05:08

## 2022-10-25 RX ADMIN — Medication 3 MILLIGRAM(S): at 21:04

## 2022-10-25 RX ADMIN — CHLORHEXIDINE GLUCONATE 1 APPLICATION(S): 213 SOLUTION TOPICAL at 17:26

## 2022-10-25 RX ADMIN — Medication 1 PUFF(S): at 02:13

## 2022-10-25 RX ADMIN — Medication 1 MILLIGRAM(S): at 23:16

## 2022-10-25 RX ADMIN — Medication 1 PUFF(S): at 14:46

## 2022-10-25 RX ADMIN — SIMETHICONE 80 MILLIGRAM(S): 80 TABLET, CHEWABLE ORAL at 17:24

## 2022-10-25 RX ADMIN — Medication 1 PUFF(S): at 06:42

## 2022-10-25 RX ADMIN — ALBUTEROL 2 PUFF(S): 90 AEROSOL, METERED ORAL at 14:46

## 2022-10-25 RX ADMIN — CHLORHEXIDINE GLUCONATE 1 APPLICATION(S): 213 SOLUTION TOPICAL at 05:07

## 2022-10-25 RX ADMIN — Medication 1 APPLICATION(S): at 11:44

## 2022-10-25 RX ADMIN — CEFTRIAXONE 100 MILLIGRAM(S): 500 INJECTION, POWDER, FOR SOLUTION INTRAMUSCULAR; INTRAVENOUS at 14:22

## 2022-10-25 RX ADMIN — HEPARIN SODIUM 5000 UNIT(S): 5000 INJECTION INTRAVENOUS; SUBCUTANEOUS at 17:25

## 2022-10-25 RX ADMIN — Medication 1 MILLIGRAM(S): at 05:07

## 2022-10-25 RX ADMIN — Medication 2: at 17:26

## 2022-10-25 RX ADMIN — MIDODRINE HYDROCHLORIDE 10 MILLIGRAM(S): 2.5 TABLET ORAL at 14:23

## 2022-10-25 RX ADMIN — MIDODRINE HYDROCHLORIDE 10 MILLIGRAM(S): 2.5 TABLET ORAL at 05:08

## 2022-10-25 RX ADMIN — Medication 1 MILLIGRAM(S): at 14:50

## 2022-10-25 RX ADMIN — Medication 4: at 11:57

## 2022-10-25 RX ADMIN — MIDODRINE HYDROCHLORIDE 10 MILLIGRAM(S): 2.5 TABLET ORAL at 21:04

## 2022-10-25 RX ADMIN — CHLORHEXIDINE GLUCONATE 15 MILLILITER(S): 213 SOLUTION TOPICAL at 17:24

## 2022-10-25 RX ADMIN — Medication 1 MILLIGRAM(S): at 06:19

## 2022-10-25 RX ADMIN — ALBUTEROL 2 PUFF(S): 90 AEROSOL, METERED ORAL at 06:41

## 2022-10-25 RX ADMIN — Medication 2: at 23:22

## 2022-10-25 RX ADMIN — ALBUTEROL 2 PUFF(S): 90 AEROSOL, METERED ORAL at 20:32

## 2022-10-25 RX ADMIN — FENTANYL CITRATE 25 MICROGRAM(S): 50 INJECTION INTRAVENOUS at 08:05

## 2022-10-25 RX ADMIN — Medication 1 PUFF(S): at 20:32

## 2022-10-25 RX ADMIN — FENTANYL CITRATE 25 MICROGRAM(S): 50 INJECTION INTRAVENOUS at 08:20

## 2022-10-25 RX ADMIN — CHLORHEXIDINE GLUCONATE 15 MILLILITER(S): 213 SOLUTION TOPICAL at 05:07

## 2022-10-25 RX ADMIN — PANTOPRAZOLE SODIUM 40 MILLIGRAM(S): 20 TABLET, DELAYED RELEASE ORAL at 05:07

## 2022-10-25 RX ADMIN — ALBUTEROL 2 PUFF(S): 90 AEROSOL, METERED ORAL at 02:14

## 2022-10-25 NOTE — PROGRESS NOTE ADULT - SUBJECTIVE AND OBJECTIVE BOX
BREA BECKHAM     [facility]    Allergies    codeine (Hives)    Intolerances        PAST MEDICAL & SURGICAL HISTORY:  Dementia of frontal lobe type      Aphasic stroke      Diabetes mellitus      Respiratory failure      Hypertension      GERD (gastroesophageal reflux disease)      Constipation      Respiratory failure      CVA (cerebral vascular accident)      HTN (hypertension)      DM (diabetes mellitus)      Advanced dementia      COVID-19 virus detected      Quadriplegia      Pneumonia      Type II diabetes mellitus      Hx of appendectomy      Gastrostomy in place      Tracheostomy in place      Tracheostomy tube present      Feeding by G-tube          FAMILY HISTORY:      Home Medications:  albuterol 90 mcg/inh inhalation aerosol with adapter: 2  inhaled every 6 hours (18 Oct 2022 11:42)  Bacid (LAC) oral tablet: 2 tab(s) by gastrostomy tube once a day (18 Oct 2022 11:42)  Betadine 10% topical swab: cleanse right and left great toes (18 Oct 2022 11:42)  chlorhexidine 0.12% mucous membrane liquid: 15 milliliter(s) mucous membrane 2 times a day (18 Oct 2022 11:42)  Eucerin topical cream: Apply topically to affected area once a day bilateral feet (18 Oct 2022 11:42)  insulin glargine 100 units/mL subcutaneous solution: 8 unit(s) subcutaneous once a day (in the morning) (18 Oct 2022 11:42)  ipratropium-albuterol 0.5 mg-2.5 mg/3 mL inhalation solution: 3 milliliter(s) inhaled 4 times a day (18 Oct 2022 11:42)  LORazepam 1 mg oral tablet: 1 tab(s) by gastrostomy tube every 4 hours (18 Oct 2022 11:42)  methocarbamol 500 mg oral tablet: 1 tab(s) by gastrostomy tube 2 times a day (18 Oct 2022 11:42)  MiraLax oral powder for reconstitution: g-tube (18 Oct 2022 13:04)  Multiple Vitamins oral tablet: 1 tab(s) orally once a day (18 Oct 2022 11:42)  nystatin 100,000 units/g topical powder: 1 application topically 3 times a day (18 Oct 2022 11:42)  omeprazole 20 mg oral delayed release capsule: orally 2 times a day (18 Oct 2022 11:42)  polyethylene glycol 3350 oral powder for reconstitution: 17 gram(s) by gastrostomy tube every 12 hours (18 Oct 2022 11:42)  senna 8.6 mg oral tablet: 3 tab(s) by gastrostomy tube once a day (at bedtime) (18 Oct 2022 11:42)  simethicone 80 mg oral tablet: g-tube (18 Oct 2022 13:04)  simethicone 80 mg oral tablet, chewable: 1 tab(s) by gastrostomy tube every 6 hours (18 Oct 2022 11:42)  sucralfate 1 g/10 mL oral suspension: 10 milliliter(s) g-tube 4 times a day (before meals and at bedtime) (18 Oct 2022 13:04)  Tylenol 325 mg oral tablet: 2 tab(s) by gastrostomy tube once a day; 60 minutes prior to dressing change  (18 Oct 2022 11:42)  Tylenol 325 mg oral tablet: 2 tab(s) by gastrostomy tube every 6 hours, As Needed (18 Oct 2022 11:42)      MEDICATIONS  (STANDING):  ALBUTerol    90 MICROgram(s) HFA Inhaler 2 Puff(s) Inhalation every 6 hours  cadexomer iodine 0.9% Gel 1 Application(s) Topical <User Schedule>  cefTRIAXone   IVPB 1000 milliGRAM(s) IV Intermittent every 24 hours  cefTRIAXone   IVPB      chlorhexidine 0.12% Liquid 15 milliLiter(s) Oral Mucosa every 12 hours  chlorhexidine 2% Cloths 1 Application(s) Topical two times a day  dexMEDEtomidine Infusion 0.5 MICROgram(s)/kG/Hr (6.14 mL/Hr) IV Continuous <Continuous>  dextrose 5%. 1000 milliLiter(s) (50 mL/Hr) IV Continuous <Continuous>  dextrose 5%. 1000 milliLiter(s) (100 mL/Hr) IV Continuous <Continuous>  dextrose 50% Injectable 25 Gram(s) IV Push once  dextrose 50% Injectable 12.5 Gram(s) IV Push once  dextrose 50% Injectable 25 Gram(s) IV Push once  glucagon  Injectable 1 milliGRAM(s) IntraMuscular once  heparin   Injectable 5000 Unit(s) SubCutaneous every 12 hours  insulin lispro (ADMELOG) corrective regimen sliding scale   SubCutaneous every 6 hours  ipratropium 17 MICROgram(s) HFA Inhaler 1 Puff(s) Inhalation every 6 hours  levoFLOXacin IVPB 750 milliGRAM(s) IV Intermittent every 48 hours  LORazepam     Tablet 1 milliGRAM(s) Oral every 6 hours  midodrine 10 milliGRAM(s) Oral every 8 hours  pantoprazole    Tablet 40 milliGRAM(s) Oral before breakfast  povidone iodine 10% Solution 1 Application(s) Topical daily  simethicone 80 milliGRAM(s) Chew every 12 hours    MEDICATIONS  (PRN):  acetaminophen     Tablet .. 650 milliGRAM(s) Oral every 6 hours PRN Temp greater or equal to 38C (100.4F), Mild Pain (1 - 3)  aluminum hydroxide/magnesium hydroxide/simethicone Suspension 30 milliLiter(s) Oral every 4 hours PRN Dyspepsia  dextrose Oral Gel 15 Gram(s) Oral once PRN Blood Glucose LESS THAN 70 milliGRAM(s)/deciliter  fentaNYL    Injectable 25 MICROGram(s) IV Push every 2 hours PRN Severe Pain (7 - 10)  LORazepam   Injectable 1 milliGRAM(s) IV Push every 4 hours PRN Agitation  melatonin 3 milliGRAM(s) Oral at bedtime PRN Insomnia  ondansetron Injectable 4 milliGRAM(s) IV Push every 8 hours PRN Nausea and/or Vomiting      Diet, NPO with Tube Feed:   Tube Feeding Modality: Gastrostomy  Nepro with Carb Steady  Total Volume for 24 Hours (mL): 800  Continuous  Starting Tube Feed Rate mL per Hour: 20  Increase Tube Feed Rate by (mL): 10     Every 4 hours  Until Goal Tube Feed Rate (mL per Hour): 40  Tube Feed Duration (in Hours): 20  Tube Feed Start Time: 16:00  Tube Feed Stop Time: 12:00  Free Water Flush  Bolus   Total Volume per Flush (mL): 250   Frequency: Every 6 Hours  Lucas(7 Gm Arginine/7 Gm Glut/1.2 Gm HMB     Qty per Day:  1 (10-19-22 @ 13:57) [Active]          Vital Signs Last 24 Hrs  T(C): 36.7 (25 Oct 2022 08:00), Max: 37.2 (24 Oct 2022 21:00)  T(F): 98 (25 Oct 2022 08:00), Max: 98.9 (24 Oct 2022 21:00)  HR: 72 (25 Oct 2022 09:00) (66 - 100)  BP: 102/64 (25 Oct 2022 09:00) (99/55 - 141/75)  BP(mean): 75 (25 Oct 2022 09:00) (68 - 96)  RR: 18 (25 Oct 2022 09:00) (18 - 32)  SpO2: 100% (25 Oct 2022 09:00) (84% - 100%)    Parameters below as of 25 Oct 2022 09:00  Patient On (Oxygen Delivery Method): ventilator    O2 Concentration (%): 60      10-24-22 @ 07:01  -  10-25-22 @ 07:00  --------------------------------------------------------  IN: 2110 mL / OUT: 750 mL / NET: 1360 mL        Mode: AC/ CMV (Assist Control/ Continuous Mandatory Ventilation), RR (machine): 18, TV (machine): 400, FiO2: 60, PEEP: 5, ITime: 1, MAP: 17, PIP: 34      LABS:                        8.6    15.63 )-----------( 210      ( 25 Oct 2022 06:37 )             29.2     10-25    146<H>  |  113<H>  |  32<H>  ----------------------------<  173<H>  3.8   |  23  |  1.01    Ca    8.7      25 Oct 2022 06:37  Phos  4.0     10-25  Mg     2.1     10-25    TPro  6.1  /  Alb  1.7<L>  /  TBili  0.4  /  DBili  x   /  AST  14  /  ALT  25  /  AlkPhos  104  10-25              WBC:  WBC Count: 15.63 K/uL (10-25 @ 06:37)  WBC Count: 13.05 K/uL (10-24 @ 07:04)  WBC Count: 12.38 K/uL (10-23 @ 06:00)  WBC Count: 9.40 K/uL (10-22 @ 06:07)      MICROBIOLOGY:  RECENT CULTURES:  10-19 .Sputum Sputum trap Stenotrophomonas maltophilia   Few Squamous epithelial cells per low power field  Few polymorphonuclear leukocytes per low power field  Few Gram positive cocci in pairs per oil power field   Moderate Mixed gram negative rods including  Moderate Stenotrophomonas maltophilia    10-18 Clean Catch Clean Catch (Midstream) Escherichia coli XXXX   >100,000 CFU/ml Escherichia coli    10-18 .Blood Blood-Peripheral XXXX XXXX   No Growth Final                    Sodium:  Sodium, Serum: 146 mmol/L (10-25 @ 06:37)  Sodium, Serum: 146 mmol/L (10-24 @ 07:04)  Sodium, Serum: 146 mmol/L (10-23 @ 06:00)  Sodium, Serum: 148 mmol/L (10-22 @ 06:07)      1.01 mg/dL 10-25 @ 06:37  1.13 mg/dL 10-24 @ 07:04  1.25 mg/dL 10-23 @ 06:00  1.18 mg/dL 10-22 @ 06:07      Hemoglobin:  Hemoglobin: 8.6 g/dL (10-25 @ 06:37)  Hemoglobin: 8.4 g/dL (10-24 @ 07:04)  Hemoglobin: 8.7 g/dL (10-23 @ 06:00)  Hemoglobin: 8.0 g/dL (10-22 @ 06:07)      Platelets: Platelet Count - Automated: 210 K/uL (10-25 @ 06:37)  Platelet Count - Automated: 224 K/uL (10-24 @ 07:04)  Platelet Count - Automated: 283 K/uL (10-23 @ 06:00)  Platelet Count - Automated: 235 K/uL (10-22 @ 06:07)      LIVER FUNCTIONS - ( 25 Oct 2022 06:37 )  Alb: 1.7 g/dL / Pro: 6.1 g/dL / ALK PHOS: 104 U/L / ALT: 25 U/L DA / AST: 14 U/L / GGT: x                 RADIOLOGY & ADDITIONAL STUDIES:      MICROBIOLOGY:  RECENT CULTURES:  10-19 .Sputum Sputum trap Stenotrophomonas maltophilia   Few Squamous epithelial cells per low power field  Few polymorphonuclear leukocytes per low power field  Few Gram positive cocci in pairs per oil power field   Moderate Mixed gram negative rods including  Moderate Stenotrophomonas maltophilia    10-18 Clean Catch Clean Catch (Midstream) Escherichia coli XXXX   >100,000 CFU/ml Escherichia coli    10-18 .Blood Blood-Peripheral XXXX XXXX   No Growth Final

## 2022-10-25 NOTE — PROGRESS NOTE ADULT - ASSESSMENT
Pt is a 76W w/ PMHx of DM2, COPD, HTN, dementia, hx of subarachnoid hemorrhage, and history of chronic trach brought in by ambulance for evaluation of low CO2 and cyanotic appearance.   Well-known and has had multiple admissions for Acute on chronic RF and PNA w/ MDROs    Acute VAP/PNA  Acute on chronic HF  CAUTI/UTI  - pt p/w cyanosis  - most recent admission in end of August, most recent cx w/ Pseudomonas and Stenotrophomonas  - labs notable for leukocytosis to 13  - CT chest Groundglass opacities and interlobular septal thickening suggestive of CHF. There are also some more confluent areas of density   which may represent atelectasis versus infiltrates.Bilateral pleural effusions have decreased from the prior exam  - CT A/P w/o acute findings  - s/p levofloxacin in the ED  - UCx E.coli   - BCx NGTD  - sputum cx w/ Stenotrophomonas maltophilia  Plan:   C/w levofloxacin 750mg q48h x10 day course, last day 10/27  C/w ceftriaxone 1gm q24h for possible UTI/CAUTI, plan for x7-10 day course with switch to PO  Trend temps and cbc--leukocytosis uptrending  --unclear cause  --may need repeat CT C/A/P given persistely uptrending leukocytosis  Pulmonary toilet  Isolation per infection control policy given hx of CRE  Supportive care/vent management per [facility]    Infectious Diseases will continue to follow. Please call with any questions.   Anrdessa Brantley M.D.  [facility] Division of Infectious Diseases [phone]

## 2022-10-25 NOTE — PROGRESS NOTE ADULT - ASSESSMENT
77yo F with PMH of dementia, COPD with chronic resp failure and is vent dependent, s/p PEG, hx of cardiac arrest, diastolic CHF, cor pulmonale, hx of CVA, COVID-19 infxn, CKD, anemia, multiple admissions for respiratory distress (recent admission from 6/1-6/9 diagnosed with PNA with parapneumonic pleural effusion s/p thoracentesis and Avycaz) who presents from [facility] for respiratory distress again a/w sepsis and respiratory failure due to suspected recurrent gram-negative PNA.    Severe sepsis and acute hypoxic respiratory failure 2/2 gram-negative PNA   - continue current vent settings (on AC with RR 16, , PEEP 5, FiO2 100%) maintain O2 sat >92%  - was on ertapenem,  zosyn+bactrim , now on levaquin  - lactate downtrending, procalcitonin high but improving with treatment (4.76 -> 3.34 -> 1.77)  - cautious use of fluids given hx of CHF (received 1750cc of NS in ED)  - f/u blood cultures (NGTD), urine culture pending   - f/u trach sputum culture - has GNR growing awaiting speciation / sensitivities  - has hx of respiratory distress driven by vent asynchrony and would require IV benzos ; trialed IV fentanyl with adequate effect this evening  - Checked CT Abd/P to eval for any other potential source sign of infection and found no significant sign of intra-abdominal infection on CT  - cc/pulm consult (Jaime), recs appreciated  - palliative care (Emre), recs appreciated - pt is full code  C/w levofloxacin 750mg q48h based on most recent cx per ID recs  ID added ceftriaxone 1gm q24h for possible UTI/CAUTI  WBC worsening today, no overnight events    Diastolic CHF  Hx of Pleural effusions  - last TTE was 5/30 with EF 65% with normal LVSF, dilated   RV, and moderate pHTN -> repeat TTE appears unchanged  - may need repeat thoracentesis (had 1.5L drained three admissions ago), pulmonology consulted; was parapneumonic on that admission as opposed to transudative and thus less likely related to CHF    hyperkalemia RYAN likely due to sepsis  will give bolus of fluids: careful use going forward.  repeat Cr and K    Anemia of chronic disease  - has been evaluated by GI and hematology in the past -> dx with ACD with intermittent GI bleeding  - s/p 2 unit PRBC   - current HB stable    Hyperkalemia  - given both insulin and lasix   still remains elevated  nutrition consulted    Hx of CKD stage 3 - near baseline of 1.2-1.3  - renally dose medications and monitor BMP and electrolytes   - Cr is 1.3 with eGFR of 41, but given pt has low muscle mass from being bedbound for years, this GFR estimate is likely falsely high  - consider nephro eval   nutrition consulted    HTN  - not on any BP meds at facility  - monitor VS    DM2  - NPO with TF  - continue with insulin 8units qAM as per last admission  - c/w moderate insulin coverage scale  - goal -180      Diet  - TF on hold 2/2 leak around site, will need GI to eval before restarting  contacted Dr. Dupree today, he will evaluate    Preventive measures  - cont with heparin subq for DVT ppx  - Full Code

## 2022-10-25 NOTE — PROGRESS NOTE ADULT - ASSESSMENT
REVIEW OF SYMPTOMS      Able to give (reliable) ROS  NO     PHYSICAL EXAM    HEENT Unremarkable  atraumatic   RESP Fair air entry EXP prolonged    Harsh breath sound Resp distres mild   CARDIAC S1 S2 No S3     NO JVD    ABDOMEN SOFT BS PRESENT NOT DISTENDED No hepatosplenomegaly   PEDAL EDEMA present No calf tenderness  NO rash       GENERAL DATA .   GOC.  10/18/2022 full code       ALLGY. codeine                            WT.          10/18/2022 48  BMI.          10/18/2022 17                     ICU STAY. 10/18/2022  COVID.  10/18/2022 scv2 (-)     BEST PRACTICE ISSUES.    HOB ELEVATN. Yes  DVT PPLX.   10/18/2022 hpsc    SQUIRES PPLX.  10/18/2022 protonix 40     INFN PPLX.    10/18/2022 ch;lorhexidine .12%  10/19/2022 chlorhexidine 2%    SP SW EM.        DIET.     10/19/2022 nepro 800 gt    VS/ PO/IO/ VENT/ DRIPS.  10/25/2022 72 110/60   10/25/2022 ac 18/400 5 60%     ASSESSMENT/RECOMMENDATIONS .   RESP.  -- Gas exchange.   10/18/2022 ac 18/400/100/5 751/37/257  -- VENT MANAGEMENT.   HOB elevation  Target Pplat 30 (-)  Target PO 90-95%  Target pH 730 (+)  Daily spontaneous breathing trials   Daily sedation vacation   -- Pleuralk effsn  Past ritchie   R THORAC   6/8/2022  g 161 l 222 p 4.9 p 10 l 16 .38    L THORAC   5/25/2022 g 183 l 144/381 .37 p 3.4/5.3 .64 p 23 l 36   5/25/2022l pl fluid  lymph pred exudate   cyto (-)     Ct  10/18/2022 sm r mod l effs large subpulmonic component   a/r No thoracentesis plannd at this point risk prohibitive in vent pt   -- Mediastinal lne.  Ct  10/18/2022 again enlarged mediastinal lns likely reactive   a/r will need followup with ct in 2m and consider biopsy if persists   -- wheeze.  10/18 albuterol hf  10/18 atrovent hf   INFECTION.  PAST ID ISSUES   8/19/2022  zosyn Se Vignesh  8/19/2022 bactrim ds 2 bid   pmh 5/2/2022 SERRATIA CARBAPENEM RESISTANT PSEUDOMONAS  -- VAP 10/18/2022.  -- UTI 10/18/2022   w 10/18-10/19-10/20-10/21-10/23-10/25/2022 w 13 - 8.8 - 15-10.3 - 9.4- 12.3 - 15.6   pr 10/20/2022 6.6    ua 10/18/2022 w 11-25   ct ch 10/18/2022 cw 8/18/20232 ggo post upper lobes with interlobular septal thickening infiltrate or atelectasis lower lobes l more than   sm r and sm to mod l effsn  rvp 10/18/2022 (-)   uc 10/18 100K E coli   sp 10/19 Stenotrophomonas   bc 10/18 (-)   10/18/2022 meropenem Dr NEWTON  dc  10/18/2022 ID Dr Brantley on case   10/19/2022 levaquin 750   10/21/2022 rocephin x 7d Dr BRITTANY Brantley      CARDIAC.  -- Hypotension.  10/18/2022 88/48   echo 8/19/2022 n lvsf dd1 pasp 58   10/20 midocrine 10.3   Target MAP 65 (+)   resolvd   GI.  -- ELEVATED LFTS.  LFTS 10/18-10/19-10/20/2022   ap 140-131- 104  ast - 44  alt 21 - 103 - 64   monitor  HEMAT.  -- Anemia.  Hb 10/18-10/19-10/20-10/21-10/22-10/23-10/24-10/25/2022 Hb 7.4 -  6.3 - 8.2  - 7.4 - 8 - 8.7 - 8.4 - 8.8   inr 10/18/2022 inr 1.23   ctap 10/20 No rp bl  Monitor  target Hb 7 (+)   -- TRANSFUSION  10/19/2022 2 U PRBC     OVERALL ASSESSMENT/DISPOSITION.  76 f PMH trach peg cva cac anoxic encephalo PH 8/19/2022 pasp 58 bl pl effs  r thora 6/8/2022 and l thora 5/25/2022 were lp exudates  recurrent hospitalizations HO CR psuedomonas 5/2/2022 zosyn 8/19/2022 admitted 10/18/2022 with resp distress     VAP   UTI   uc 10/18 100K E coli   sp 10/19 Stenotrophomonas   10/19/2022 levaquin 750   10/21/2022 rocephin x 7d Dr BRITTANY Brantley   Vent mgmt   Hypotension  10/20 midocrine 10.3  Anemia  10/19/2022 2 U PRBC   Woody 10/23/2022      TIME SPENT   Over 39 minutes aggregate critical care time spent on encounter; activities included   direct patient care, counseling and/or coordinating care reviewing notes, lab data/ imaging , discussion with multidisciplinary team/ patient  /family and explaining in detail risks, benefits, alternatives  of the recommendations     CHAPINCITO GUIDRY 76 f [place] S 10/18/2022   DR ANG PADGETT

## 2022-10-25 NOTE — PROGRESS NOTE ADULT - SUBJECTIVE AND OBJECTIVE BOX
Patient is a 76y old  Female who presents with a chief complaint of Acute on Hypoxemic respiratory failure (25 Oct 2022 06:36)      INTERVAL HPI/OVERNIGHT EVENTS:    no overnight events,   site around peg still leaking  still distended    MEDICATIONS  (STANDING):  ALBUTerol    90 MICROgram(s) HFA Inhaler 2 Puff(s) Inhalation every 6 hours  cadexomer iodine 0.9% Gel 1 Application(s) Topical <User Schedule>  cefTRIAXone   IVPB 1000 milliGRAM(s) IV Intermittent every 24 hours  cefTRIAXone   IVPB      chlorhexidine 0.12% Liquid 15 milliLiter(s) Oral Mucosa every 12 hours  chlorhexidine 2% Cloths 1 Application(s) Topical two times a day  dexMEDEtomidine Infusion 0.5 MICROgram(s)/kG/Hr (6.14 mL/Hr) IV Continuous <Continuous>  dextrose 5%. 1000 milliLiter(s) (50 mL/Hr) IV Continuous <Continuous>  dextrose 5%. 1000 milliLiter(s) (100 mL/Hr) IV Continuous <Continuous>  dextrose 50% Injectable 25 Gram(s) IV Push once  dextrose 50% Injectable 12.5 Gram(s) IV Push once  dextrose 50% Injectable 25 Gram(s) IV Push once  glucagon  Injectable 1 milliGRAM(s) IntraMuscular once  heparin   Injectable 5000 Unit(s) SubCutaneous every 12 hours  insulin lispro (ADMELOG) corrective regimen sliding scale   SubCutaneous every 6 hours  ipratropium 17 MICROgram(s) HFA Inhaler 1 Puff(s) Inhalation every 6 hours  levoFLOXacin IVPB 750 milliGRAM(s) IV Intermittent every 48 hours  LORazepam     Tablet 1 milliGRAM(s) Oral every 6 hours  midodrine 10 milliGRAM(s) Oral every 8 hours  pantoprazole    Tablet 40 milliGRAM(s) Oral before breakfast  povidone iodine 10% Solution 1 Application(s) Topical daily  simethicone 80 milliGRAM(s) Chew every 12 hours    MEDICATIONS  (PRN):  acetaminophen     Tablet .. 650 milliGRAM(s) Oral every 6 hours PRN Temp greater or equal to 38C (100.4F), Mild Pain (1 - 3)  aluminum hydroxide/magnesium hydroxide/simethicone Suspension 30 milliLiter(s) Oral every 4 hours PRN Dyspepsia  dextrose Oral Gel 15 Gram(s) Oral once PRN Blood Glucose LESS THAN 70 milliGRAM(s)/deciliter  fentaNYL    Injectable 25 MICROGram(s) IV Push every 2 hours PRN Severe Pain (7 - 10)  LORazepam   Injectable 1 milliGRAM(s) IV Push every 4 hours PRN Agitation  melatonin 3 milliGRAM(s) Oral at bedtime PRN Insomnia  ondansetron Injectable 4 milliGRAM(s) IV Push every 8 hours PRN Nausea and/or Vomiting      Allergies    codeine (Hives)    Intolerances        REVIEW OF SYSTEMS:  unable to obtain    Vital Signs Last 24 Hrs  T(C): 36.7 (25 Oct 2022 08:00), Max: 37.2 (24 Oct 2022 21:00)  T(F): 98 (25 Oct 2022 08:00), Max: 98.9 (24 Oct 2022 21:00)  HR: 72 (25 Oct 2022 09:00) (66 - 100)  BP: 102/64 (25 Oct 2022 09:00) (99/55 - 141/75)  BP(mean): 75 (25 Oct 2022 09:00) (68 - 96)  RR: 18 (25 Oct 2022 09:00) (18 - 32)  SpO2: 100% (25 Oct 2022 09:00) (84% - 100%)    Parameters below as of 25 Oct 2022 09:00  Patient On (Oxygen Delivery Method): ventilator    O2 Concentration (%): 60    PHYSICAL EXAM:  General: NAD  HEENT: NC/AT, EOMI,   Neck: trach to vent  Lungs: MV breath sounds  Heart: Regular rate and rhythm. No murmur, rub or gallop.  Abdomen: Soft. Nondistended. Nontender. +PEG  Skin: Warm. Dry.    LABS:                        8.6    15.63 )-----------( 210      ( 25 Oct 2022 06:37 )             29.2     25 Oct 2022 06:37    146    |  113    |  32     ----------------------------<  173    3.8     |  23     |  1.01     Ca    8.7        25 Oct 2022 06:37  Phos  4.0       25 Oct 2022 06:37  Mg     2.1       25 Oct 2022 06:37    TPro  6.1    /  Alb  1.7    /  TBili  0.4    /  DBili  x      /  AST  14     /  ALT  25     /  AlkPhos  104    25 Oct 2022 06:37        CAPILLARY BLOOD GLUCOSE      POCT Blood Glucose.: 171 mg/dL (25 Oct 2022 05:05)  POCT Blood Glucose.: 250 mg/dL (24 Oct 2022 23:27)  POCT Blood Glucose.: 220 mg/dL (24 Oct 2022 17:30)  POCT Blood Glucose.: 247 mg/dL (24 Oct 2022 11:57)      RADIOLOGY & ADDITIONAL TESTS:

## 2022-10-25 NOTE — PROGRESS NOTE ADULT - ASSESSMENT
76F with dementia, COPD with chronic resp failure and is vent dependent, s/p PEG, hx of cardiac arrest, CHF, cor pulmonale, CVA, COVID-19 infxn, CKD, anemia, multiple admissions    dementia  COPD  chr resp failure  vegetative state  dysphagia  peg  hx of card arrest - anoxic brain  CHF  cva      on emp ABX  CCM notes reviewed  vs noted  labs reviewed  Wound care in progress -   GI consult noted  Fentanyl added for pain - vent synchrony    Diagnosis; Prognosis; MOLST Discussed  Marciano -   HCP  pt is full code  spoke with  -

## 2022-10-25 NOTE — PROGRESS NOTE ADULT - SUBJECTIVE AND OBJECTIVE BOX
Chief Complaint: Hypoxia    Interval Events: No events overnight.    Review of Systems:  Unable to obtain    Physical Exam:  Vital Signs Last 24 Hrs  T(C): 36.7 (25 Oct 2022 08:00), Max: 37.2 (24 Oct 2022 21:00)  T(F): 98 (25 Oct 2022 08:00), Max: 98.9 (24 Oct 2022 21:00)  HR: 72 (25 Oct 2022 09:00) (66 - 100)  BP: 102/64 (25 Oct 2022 09:00) (99/55 - 141/75)  BP(mean): 75 (25 Oct 2022 09:00) (68 - 96)  RR: 18 (25 Oct 2022 09:00) (18 - 32)  SpO2: 100% (25 Oct 2022 09:00) (84% - 100%)  Parameters below as of 25 Oct 2022 09:00  Patient On (Oxygen Delivery Method): ventilator  O2 Concentration (%): 60  General: Unresponsive  HEENT: Trach  Neck: No JVD, no carotid bruit  Lungs: CTAB  CV: RRR, nl S1/S2, no M/R/G  Abdomen: S/NT/ND, +BS  Extremities: No LE edema, no cyanosis  Neuro: AAOx0  Skin: No rash    Labs:    10-25    146<H>  |  113<H>  |  32<H>  ----------------------------<  173<H>  3.8   |  23  |  1.01    Ca    8.7      25 Oct 2022 06:37  Phos  4.0     10-25  Mg     2.1     10-25    TPro  6.1  /  Alb  1.7<L>  /  TBili  0.4  /  DBili  x   /  AST  14  /  ALT  25  /  AlkPhos  104  10-25                        8.6    15.63 )-----------( 210      ( 25 Oct 2022 06:37 )             29.2       Telemetry: Sinus rhythm

## 2022-10-25 NOTE — CHART NOTE - NSCHARTNOTEFT_GEN_A_CORE
Assessment: Chart reviewed, events noted: As per chart "The pt is a 77yo F with PMH of dementia, COPD with chronic resp failure and is vent dependent, s/p PEG, hx of cardiac arrest, diastolic CHF, cor pulmonale, hx of CVA, COVID-19 infxn, CKD, anemia, multiple admissions for respiratory distress (recent admission from - diagnosed with PNA with parapneumonic pleural effusion s/p thoracentesis and Avycaz) who presents from [facility] for respiratory distress again a/w sepsis and respiratory failure due to suspected recurrent gram-negative PNA."    10/25  Pt seen at bedside for nutrition follow up, vented with trachea. Pt previously NPO from (10/18-10/22), TF initiated on (10/22), observed feeds at goal. Previous GI consulted and readjusted PEG. Per rounds today, reported leaks likely 2/2 bolus water flush, will change to pump water flush 35ml/hr x24 hr to provide 840 ml free H2O. Continue with Nepro @40ml/hr x 20 hrs + Lucas provides 90kcal and 14 gm AA. No reported acute GI distress, last BM on (10/25) per flow sheets. Pertinent medications/nutrition labs reviewed; noted elevated BUN. Monitor new tube feed regimen tolerance and needs adjust hydration method. Will reassess needs to change formula to Glucerna 1.5 pending hospital course. RD to remain available to adjust EN formulary, volume/rate as needed and will follow up to continue to monitor pt's nutrition status.     Factors impacting intake: [ ] none [ ] nausea  [ ] vomiting [ ] diarrhea [ ] constipation  [ ]chewing problems [ ] swallowing issues  [x ] other: NPO with tube feed    Diet Prescription: Diet, NPO with Tube Feed:   Tube Feeding Modality: Gastrostomy  Nepro with Carb Steady  Total Volume for 24 Hours (mL): 800  Continuous  Starting Tube Feed Rate {mL per Hour}: 40  Until Goal Tube Feed Rate (mL per Hour): 40  Tube Feed Duration (in Hours): 20  Tube Feed Start Time: 11:00  Free Water Flush  Pump   Rate (mL per Hour): 35   Frequency: Every Hour    Duration (Hours): 24    Start Time: 11:00  Lucas(7 Gm Arginine/7 Gm Glut/1.2 Gm HMB     Qty per Day:  1 (10-25-22 @ 10:53)    Intake: feeds at goal    Daily Weight in k.7 (25 Oct 2022 03:36)  Weight in k.5 (24 Oct 2022 05:00)  Weight in k.4 (23 Oct 2022 04:02)  Weight in k.9 (22 Oct 2022 06:00)  Weight in k.7 (21 Oct 2022 05:00)  Weight in k.3 (20 Oct 2022 05:00)  Weight in k.4 (19 Oct 2022 05:35)    -- Pt demonstrates a gradual weight after tube feed initiation, weight gain favorable/planned, will continue to monitor weight trends as able    Pertinent Medications: MEDICATIONS  (STANDING):  ALBUTerol    90 MICROgram(s) HFA Inhaler 2 Puff(s) Inhalation every 6 hours  cadexomer iodine 0.9% Gel 1 Application(s) Topical <User Schedule>  cefTRIAXone   IVPB 1000 milliGRAM(s) IV Intermittent every 24 hours  cefTRIAXone   IVPB      chlorhexidine 0.12% Liquid 15 milliLiter(s) Oral Mucosa every 12 hours  chlorhexidine 2% Cloths 1 Application(s) Topical two times a day  dexMEDEtomidine Infusion 0.5 MICROgram(s)/kG/Hr (6.14 mL/Hr) IV Continuous <Continuous>  dextrose 5%. 1000 milliLiter(s) (50 mL/Hr) IV Continuous <Continuous>  dextrose 5%. 1000 milliLiter(s) (100 mL/Hr) IV Continuous <Continuous>  dextrose 50% Injectable 25 Gram(s) IV Push once  dextrose 50% Injectable 12.5 Gram(s) IV Push once  dextrose 50% Injectable 25 Gram(s) IV Push once  glucagon  Injectable 1 milliGRAM(s) IntraMuscular once  heparin   Injectable 5000 Unit(s) SubCutaneous every 12 hours  insulin lispro (ADMELOG) corrective regimen sliding scale   SubCutaneous every 6 hours  ipratropium 17 MICROgram(s) HFA Inhaler 1 Puff(s) Inhalation every 6 hours  levoFLOXacin IVPB 750 milliGRAM(s) IV Intermittent every 48 hours  LORazepam     Tablet 1 milliGRAM(s) Oral every 6 hours  midodrine 10 milliGRAM(s) Oral every 8 hours  pantoprazole    Tablet 40 milliGRAM(s) Oral before breakfast  povidone iodine 10% Solution 1 Application(s) Topical daily  simethicone 80 milliGRAM(s) Chew every 12 hours    MEDICATIONS  (PRN):  acetaminophen     Tablet .. 650 milliGRAM(s) Oral every 6 hours PRN Temp greater or equal to 38C (100.4F), Mild Pain (1 - 3)  aluminum hydroxide/magnesium hydroxide/simethicone Suspension 30 milliLiter(s) Oral every 4 hours PRN Dyspepsia  dextrose Oral Gel 15 Gram(s) Oral once PRN Blood Glucose LESS THAN 70 milliGRAM(s)/deciliter  fentaNYL    Injectable 25 MICROGram(s) IV Push every 2 hours PRN Severe Pain (7 - 10)  LORazepam   Injectable 1 milliGRAM(s) IV Push every 4 hours PRN Agitation  melatonin 3 milliGRAM(s) Oral at bedtime PRN Insomnia  ondansetron Injectable 4 milliGRAM(s) IV Push every 8 hours PRN Nausea and/or Vomiting    Pertinent Labs: 10-25 Na146 mmol/L<H> Glu 173 mg/dL<H> K+ 3.8 mmol/L Cr  1.01 mg/dL BUN 32 mg/dL<H> 10-25 Phos 4.0 mg/dL 10-25 Alb 1.7 g/dL<L>     CAPILLARY BLOOD GLUCOSE      POCT Blood Glucose.: 171 mg/dL (25 Oct 2022 05:05)  POCT Blood Glucose.: 250 mg/dL (24 Oct 2022 23:27)  POCT Blood Glucose.: 220 mg/dL (24 Oct 2022 17:30)  POCT Blood Glucose.: 247 mg/dL (24 Oct 2022 11:57)    Skin per nursing documentation: Sacral Stage 2, Left Glutea Fold Stage IV  Edema: No edema noted, per nursing flow sheets     Estimated Needs:   [ x] no change since previous assessment  [ ] recalculated:     Previous Nutrition Diagnosis:   [ ] Inadequate Energy Intake [ ]Inadequate Oral Intake [ ] Excessive Energy Intake   [ ] Underweight [x ] Increased Nutrient Needs [ ] Overweight/Obesity [x ] Altered Nutrition related lab values  [ ] Altered GI Function [ ] Unintended Weight Loss [ ] Food & Nutrition Related Knowledge Deficit [ ] Malnutrition     Nutrition Diagnosis is [ x] ongoing  [ ] resolved [ ] not applicable     New Nutrition Diagnosis: [x ] not applicable       Interventions:   Recommend  [x ] Change Diet To:  Nepro @40ml/hr x 20 hrs provides 800ml of formula, 1440 kcal, 65 gm protein and 582 ml free water + Lucas provides 90kcal and 14 gm AA; pump water flush 35ml/hr x24 hr to provide 840 ml free H2O  [ ] Nutrition Supplement  [ ] Nutrition Support  [ ] Other: monitor tolerance    Monitoring and Evaluation:   [X ] Intake [ x ] Tolerance to diet prescription [ x ] weights [ x ] labs[ x ] follow up per protocol  [ ] other:

## 2022-10-25 NOTE — PROGRESS NOTE ADULT - SUBJECTIVE AND OBJECTIVE BOX
Date/Time Patient Seen:  		  Referring MD:   Data Reviewed	       Patient is a 76y old  Female who presents with a chief complaint of Acute on Hypoxemic respiratory failure (24 Oct 2022 20:43)      Subjective/HPI     PAST MEDICAL & SURGICAL HISTORY:  Dementia of frontal lobe type    Aphasic stroke    Diabetes mellitus    Respiratory failure    Hypertension    GERD (gastroesophageal reflux disease)    Constipation    Respiratory failure    CVA (cerebral vascular accident)    HTN (hypertension)    DM (diabetes mellitus)    Advanced dementia    COVID-19 virus detected    Quadriplegia    Pneumonia    Type II diabetes mellitus    Hx of appendectomy    Gastrostomy in place    Tracheostomy in place    Tracheostomy tube present    Feeding by G-tube          Medication list         MEDICATIONS  (STANDING):  ALBUTerol    90 MICROgram(s) HFA Inhaler 2 Puff(s) Inhalation every 6 hours  cadexomer iodine 0.9% Gel 1 Application(s) Topical <User Schedule>  cefTRIAXone   IVPB 1000 milliGRAM(s) IV Intermittent every 24 hours  cefTRIAXone   IVPB      chlorhexidine 0.12% Liquid 15 milliLiter(s) Oral Mucosa every 12 hours  chlorhexidine 2% Cloths 1 Application(s) Topical two times a day  dexMEDEtomidine Infusion 0.5 MICROgram(s)/kG/Hr (6.14 mL/Hr) IV Continuous <Continuous>  dextrose 5%. 1000 milliLiter(s) (50 mL/Hr) IV Continuous <Continuous>  dextrose 5%. 1000 milliLiter(s) (100 mL/Hr) IV Continuous <Continuous>  dextrose 50% Injectable 25 Gram(s) IV Push once  dextrose 50% Injectable 12.5 Gram(s) IV Push once  dextrose 50% Injectable 25 Gram(s) IV Push once  glucagon  Injectable 1 milliGRAM(s) IntraMuscular once  heparin   Injectable 5000 Unit(s) SubCutaneous every 12 hours  insulin lispro (ADMELOG) corrective regimen sliding scale   SubCutaneous every 6 hours  ipratropium 17 MICROgram(s) HFA Inhaler 1 Puff(s) Inhalation every 6 hours  levoFLOXacin IVPB 750 milliGRAM(s) IV Intermittent every 48 hours  LORazepam     Tablet 1 milliGRAM(s) Oral every 6 hours  midodrine 10 milliGRAM(s) Oral every 8 hours  pantoprazole    Tablet 40 milliGRAM(s) Oral before breakfast  povidone iodine 10% Solution 1 Application(s) Topical daily  simethicone 80 milliGRAM(s) Chew every 12 hours    MEDICATIONS  (PRN):  acetaminophen     Tablet .. 650 milliGRAM(s) Oral every 6 hours PRN Temp greater or equal to 38C (100.4F), Mild Pain (1 - 3)  aluminum hydroxide/magnesium hydroxide/simethicone Suspension 30 milliLiter(s) Oral every 4 hours PRN Dyspepsia  dextrose Oral Gel 15 Gram(s) Oral once PRN Blood Glucose LESS THAN 70 milliGRAM(s)/deciliter  fentaNYL    Injectable 25 MICROGram(s) IV Push every 2 hours PRN Severe Pain (7 - 10)  LORazepam   Injectable 1 milliGRAM(s) IV Push every 4 hours PRN Agitation  melatonin 3 milliGRAM(s) Oral at bedtime PRN Insomnia  ondansetron Injectable 4 milliGRAM(s) IV Push every 8 hours PRN Nausea and/or Vomiting         Vitals log        ICU Vital Signs Last 24 Hrs  T(C): 36.7 (25 Oct 2022 03:36), Max: 37.2 (24 Oct 2022 21:00)  T(F): 98.1 (25 Oct 2022 03:36), Max: 98.9 (24 Oct 2022 21:00)  HR: 66 (25 Oct 2022 06:00) (66 - 115)  BP: 110/64 (25 Oct 2022 06:00) (99/55 - 183/79)  BP(mean): 79 (25 Oct 2022 06:00) (68 - 109)  ABP: --  ABP(mean): --  RR: 18 (25 Oct 2022 06:00) (18 - 32)  SpO2: 99% (25 Oct 2022 06:00) (84% - 100%)    O2 Parameters below as of 25 Oct 2022 06:00  Patient On (Oxygen Delivery Method): ventilator  O2 Flow (L/min): 60           Mode: AC/ CMV (Assist Control/ Continuous Mandatory Ventilation)  RR (machine): 18  TV (machine): 400  FiO2: 60  PEEP: 5  ITime: 1  MAP: 13  PIP: 30      Input and Output:  I&O's Detail    23 Oct 2022 07:01  -  24 Oct 2022 07:00  --------------------------------------------------------  IN:    Free Water: 750 mL    Nepro with Carb Steady: 840 mL  Total IN: 1590 mL    OUT:    Indwelling Catheter - Urethral (mL): 850 mL    Voided (mL): 350 mL  Total OUT: 1200 mL    Total NET: 390 mL      24 Oct 2022 07:01  -  25 Oct 2022 06:36  --------------------------------------------------------  IN:    Free Water: 1000 mL    IV PiggyBack: 150 mL    Nepro with Carb Steady: 960 mL  Total IN: 2110 mL    OUT:    Indwelling Catheter - Urethral (mL): 750 mL  Total OUT: 750 mL    Total NET: 1360 mL          Lab Data                        8.4    13.05 )-----------( 224      ( 24 Oct 2022 07:04 )             28.8     10-24    146<H>  |  114<H>  |  41<H>  ----------------------------<  230<H>  3.7   |  23  |  1.13    Ca    8.5      24 Oct 2022 07:04  Phos  3.6     10-24  Mg     2.4     10-24    TPro  6.4  /  Alb  1.6<L>  /  TBili  0.2  /  DBili  x   /  AST  12  /  ALT  18  /  AlkPhos  104  10-24            Review of Systems	      Objective     Physical Examination    heart s1s2  lung dc BS  head nc      Pertinent Lab findings & Imaging      Annalise:  NO   Adequate UO     I&O's Detail    23 Oct 2022 07:01  -  24 Oct 2022 07:00  --------------------------------------------------------  IN:    Free Water: 750 mL    Nepro with Carb Steady: 840 mL  Total IN: 1590 mL    OUT:    Indwelling Catheter - Urethral (mL): 850 mL    Voided (mL): 350 mL  Total OUT: 1200 mL    Total NET: 390 mL      24 Oct 2022 07:01  -  25 Oct 2022 06:36  --------------------------------------------------------  IN:    Free Water: 1000 mL    IV PiggyBack: 150 mL    Nepro with Carb Steady: 960 mL  Total IN: 2110 mL    OUT:    Indwelling Catheter - Urethral (mL): 750 mL  Total OUT: 750 mL    Total NET: 1360 mL               Discussed with:     Cultures:	        Radiology

## 2022-10-25 NOTE — PROGRESS NOTE ADULT - SUBJECTIVE AND OBJECTIVE BOX
INTERVAL HPI/OVERNIGHT EVENTS:  peg tube adjusted  MEDICATIONS  (STANDING):  ALBUTerol    90 MICROgram(s) HFA Inhaler 2 Puff(s) Inhalation every 6 hours  cadexomer iodine 0.9% Gel 1 Application(s) Topical <User Schedule>  cefTRIAXone   IVPB 1000 milliGRAM(s) IV Intermittent every 24 hours  cefTRIAXone   IVPB      chlorhexidine 0.12% Liquid 15 milliLiter(s) Oral Mucosa every 12 hours  chlorhexidine 2% Cloths 1 Application(s) Topical two times a day  dexMEDEtomidine Infusion 0.5 MICROgram(s)/kG/Hr (6.14 mL/Hr) IV Continuous <Continuous>  dextrose 5%. 1000 milliLiter(s) (50 mL/Hr) IV Continuous <Continuous>  dextrose 5%. 1000 milliLiter(s) (100 mL/Hr) IV Continuous <Continuous>  dextrose 50% Injectable 25 Gram(s) IV Push once  dextrose 50% Injectable 12.5 Gram(s) IV Push once  dextrose 50% Injectable 25 Gram(s) IV Push once  glucagon  Injectable 1 milliGRAM(s) IntraMuscular once  heparin   Injectable 5000 Unit(s) SubCutaneous every 12 hours  insulin lispro (ADMELOG) corrective regimen sliding scale   SubCutaneous every 6 hours  ipratropium 17 MICROgram(s) HFA Inhaler 1 Puff(s) Inhalation every 6 hours  levoFLOXacin IVPB 750 milliGRAM(s) IV Intermittent every 48 hours  LORazepam     Tablet 1 milliGRAM(s) Oral every 6 hours  midodrine 10 milliGRAM(s) Oral every 8 hours  pantoprazole    Tablet 40 milliGRAM(s) Oral before breakfast  povidone iodine 10% Solution 1 Application(s) Topical daily  simethicone 80 milliGRAM(s) Chew every 12 hours    MEDICATIONS  (PRN):  acetaminophen     Tablet .. 650 milliGRAM(s) Oral every 6 hours PRN Temp greater or equal to 38C (100.4F), Mild Pain (1 - 3)  aluminum hydroxide/magnesium hydroxide/simethicone Suspension 30 milliLiter(s) Oral every 4 hours PRN Dyspepsia  dextrose Oral Gel 15 Gram(s) Oral once PRN Blood Glucose LESS THAN 70 milliGRAM(s)/deciliter  fentaNYL    Injectable 25 MICROGram(s) IV Push every 2 hours PRN Severe Pain (7 - 10)  LORazepam   Injectable 1 milliGRAM(s) IV Push every 4 hours PRN Agitation  melatonin 3 milliGRAM(s) Oral at bedtime PRN Insomnia  ondansetron Injectable 4 milliGRAM(s) IV Push every 8 hours PRN Nausea and/or Vomiting      Allergies    codeine (Hives)    Intolerances        Review of Systems:    General:  No wt loss, fevers, chills, night sweats,fatigue,   Eyes:  Good vision, no reported pain  ENT:  No sore throat, pain, runny nose, dysphagia  CV:  No pain, palpitatioins, hypo/hypertension  Resp:  No dyspnea, cough, tachypnea, wheezing  GI:  No pain, No nausea, No vomiting, No diarrhea, No constipatiion, No weight loss, No fever, No pruritis, No rectal bleeding, No tarry stools, No dysphagia,  :  No pain, bleeding, incontinence, nocturia  Muscle:  No pain, weakness  Neuro:  No weakness, tingling, memory problems  Psych:  No fatigue, insomnia, mood problems, depression  Endocrine:  No polyuria, polydypsia, cold/heat intolerance  Heme:  No petechiae, ecchymosis, easy bruisability  Skin:  No rash, tattoos, scars, edema      Vital Signs Last 24 Hrs  T(C): 36.7 (25 Oct 2022 08:00), Max: 37.2 (24 Oct 2022 21:00)  T(F): 98 (25 Oct 2022 08:00), Max: 98.9 (24 Oct 2022 21:00)  HR: 75 (25 Oct 2022 10:37) (66 - 100)  BP: 105/88 (25 Oct 2022 10:00) (99/55 - 141/75)  BP(mean): 93 (25 Oct 2022 10:00) (68 - 96)  RR: 25 (25 Oct 2022 10:00) (18 - 32)  SpO2: 99% (25 Oct 2022 10:37) (84% - 100%)    Parameters below as of 25 Oct 2022 10:00  Patient On (Oxygen Delivery Method): ventilator    O2 Concentration (%): 60    PHYSICAL EXAM:    Constitutional: NAD, well-developed  HEENT: EOMI, throat clear  Neck: No LAD, supple  Respiratory: CTA and P  Cardiovascular: S1 and S2, RRR, no M  Gastrointestinal: BS+, soft, NT/ND, neg HSM,  Extremities: No peripheral edema, neg clubing, cyanosis  Vascular: 2+ peripheral pulses  Neurological: A/O x 3, no focal deficits  Psychiatric: Normal mood, normal affect  Skin: No rashes      LABS:                        8.6    15.63 )-----------( 210      ( 25 Oct 2022 06:37 )             29.2     10-25    146<H>  |  113<H>  |  32<H>  ----------------------------<  173<H>  3.8   |  23  |  1.01    Ca    8.7      25 Oct 2022 06:37  Phos  4.0     10-25  Mg     2.1     10-25    TPro  6.1  /  Alb  1.7<L>  /  TBili  0.4  /  DBili  x   /  AST  14  /  ALT  25  /  AlkPhos  104  10-25          RADIOLOGY & ADDITIONAL TESTS:

## 2022-10-25 NOTE — PROGRESS NOTE ADULT - SUBJECTIVE AND OBJECTIVE BOX
ICU Progress Note    HPI:    S:    Pt seen and examined  HD #  Pt here for    PRESSORS: [ ] YES [ ] NO  WHICH:  DOSE:    Allergies    codeine (Hives)    Intolerances        MEDICATIONS  (STANDING):  ALBUTerol    90 MICROgram(s) HFA Inhaler 2 Puff(s) Inhalation every 6 hours  cadexomer iodine 0.9% Gel 1 Application(s) Topical <User Schedule>  cefTRIAXone   IVPB 1000 milliGRAM(s) IV Intermittent every 24 hours  cefTRIAXone   IVPB      chlorhexidine 0.12% Liquid 15 milliLiter(s) Oral Mucosa every 12 hours  chlorhexidine 2% Cloths 1 Application(s) Topical two times a day  dexMEDEtomidine Infusion 0.5 MICROgram(s)/kG/Hr (6.14 mL/Hr) IV Continuous <Continuous>  dextrose 5%. 1000 milliLiter(s) (100 mL/Hr) IV Continuous <Continuous>  dextrose 5%. 1000 milliLiter(s) (50 mL/Hr) IV Continuous <Continuous>  dextrose 50% Injectable 25 Gram(s) IV Push once  dextrose 50% Injectable 12.5 Gram(s) IV Push once  dextrose 50% Injectable 25 Gram(s) IV Push once  glucagon  Injectable 1 milliGRAM(s) IntraMuscular once  heparin   Injectable 5000 Unit(s) SubCutaneous every 12 hours  insulin lispro (ADMELOG) corrective regimen sliding scale   SubCutaneous every 6 hours  ipratropium 17 MICROgram(s) HFA Inhaler 1 Puff(s) Inhalation every 6 hours  levoFLOXacin IVPB 750 milliGRAM(s) IV Intermittent every 48 hours  LORazepam     Tablet 1 milliGRAM(s) Oral every 6 hours  midodrine 10 milliGRAM(s) Oral every 8 hours  pantoprazole    Tablet 40 milliGRAM(s) Oral before breakfast  povidone iodine 10% Solution 1 Application(s) Topical daily  simethicone 80 milliGRAM(s) Chew every 12 hours    MEDICATIONS  (PRN):  acetaminophen     Tablet .. 650 milliGRAM(s) Oral every 6 hours PRN Temp greater or equal to 38C (100.4F), Mild Pain (1 - 3)  aluminum hydroxide/magnesium hydroxide/simethicone Suspension 30 milliLiter(s) Oral every 4 hours PRN Dyspepsia  dextrose Oral Gel 15 Gram(s) Oral once PRN Blood Glucose LESS THAN 70 milliGRAM(s)/deciliter  fentaNYL    Injectable 25 MICROGram(s) IV Push every 2 hours PRN Severe Pain (7 - 10)  LORazepam   Injectable 1 milliGRAM(s) IV Push every 4 hours PRN Agitation  melatonin 3 milliGRAM(s) Oral at bedtime PRN Insomnia  ondansetron Injectable 4 milliGRAM(s) IV Push every 8 hours PRN Nausea and/or Vomiting      Drug Dosing Weight  Height (cm): 165.1 (18 Oct 2022 11:15)  Weight (kg): 49.1 (18 Oct 2022 15:00)  BMI (kg/m2): 18 (18 Oct 2022 15:00)  BSA (m2): 1.52 (18 Oct 2022 15:00)    CENTRAL LINE: [ ] YES [ ] NO  LOCATION:   DATE INSERTED:  REMOVE: [ ] YES [ ] NO  EXPLAIN:    REID: [ ] YES [ ] NO    DATE INSERTED:  REMOVE: [ ] YES [ ] NO  EXPLAIN:    A-LINE: [ ] YES [ ] NO  LOCATION:   DATE INSERTED:  REMOVE: [ ] YES [ ] NO  EXPLAIN:    PAST MEDICAL & SURGICAL HISTORY:  Dementia of frontal lobe type      Aphasic stroke      Diabetes mellitus      Respiratory failure      Hypertension      GERD (gastroesophageal reflux disease)      Constipation      Respiratory failure      CVA (cerebral vascular accident)      HTN (hypertension)      DM (diabetes mellitus)      Advanced dementia      COVID-19 virus detected      Quadriplegia      Pneumonia      Type II diabetes mellitus      Hx of appendectomy      Gastrostomy in place      Tracheostomy in place      Tracheostomy tube present      Feeding by G-tube          FAMILY HISTORY:          ROS: See HPI; otherwise, all systems reviewed and negative.    O:    ICU Vital Signs Last 24 Hrs  T(C): 37 (25 Oct 2022 16:25), Max: 37.2 (24 Oct 2022 21:00)  T(F): 98.6 (25 Oct 2022 16:25), Max: 98.9 (24 Oct 2022 21:00)  HR: 71 (25 Oct 2022 19:00) (66 - 100)  BP: 115/69 (25 Oct 2022 19:00) (99/60 - 141/75)  BP(mean): 84 (25 Oct 2022 19:00) (71 - 96)  ABP: --  ABP(mean): --  RR: 17 (25 Oct 2022 19:00) (16 - 32)  SpO2: 100% (25 Oct 2022 19:00) (94% - 100%)    O2 Parameters below as of 25 Oct 2022 19:00  Patient On (Oxygen Delivery Method): ventilator    O2 Concentration (%): 60            I&O's Detail    24 Oct 2022 07:01  -  25 Oct 2022 07:00  --------------------------------------------------------  IN:    Free Water: 1000 mL    IV PiggyBack: 150 mL    Nepro with Carb Steady: 960 mL  Total IN: 2110 mL    OUT:    Indwelling Catheter - Urethral (mL): 750 mL  Total OUT: 750 mL    Total NET: 1360 mL      25 Oct 2022 07:01  -  25 Oct 2022 19:24  --------------------------------------------------------  IN:    Free Water: 250 mL    IV PiggyBack: 50 mL    Nepro with Carb Steady: 200 mL  Total IN: 500 mL    OUT:    Indwelling Catheter - Urethral (mL): 300 mL  Total OUT: 300 mL    Total NET: 200 mL          Mode: AC/ CMV (Assist Control/ Continuous Mandatory Ventilation)  RR (machine): 18  TV (machine): 400  FiO2: 60  PEEP: 5  ITime: 1  MAP: 18  PIP: 35      PE:    Constitutional: Healthy M lying in bed in NAD.   Neck: No JVD, trachea midline.   Respiratory: CTA B/L good BS B/L no W/R/R.  Cardiovascular: S1S2+ RRR no M/R/G.  Gastrointestinal: Soft, NTND.  Extremities: No peripheral edema, No cyanosis, clubbing.  Neurological: Awake, conversive, alert, no gross deficits.  Skin: No rashes, warm, moist.    LABS:    CBC Full  -  ( 25 Oct 2022 06:37 )  WBC Count : 15.63 K/uL  RBC Count : 3.11 M/uL  Hemoglobin : 8.6 g/dL  Hematocrit : 29.2 %  Platelet Count - Automated : 210 K/uL  Mean Cell Volume : 93.9 fl  Mean Cell Hemoglobin : 27.7 pg  Mean Cell Hemoglobin Concentration : 29.5 gm/dL  Auto Neutrophil # : 11.17 K/uL  Auto Lymphocyte # : 2.95 K/uL  Auto Monocyte # : 1.08 K/uL  Auto Eosinophil # : 0.17 K/uL  Auto Basophil # : 0.03 K/uL  Auto Neutrophil % : 71.4 %  Auto Lymphocyte % : 18.9 %  Auto Monocyte % : 6.9 %  Auto Eosinophil % : 1.1 %  Auto Basophil % : 0.2 %    10-25    146<H>  |  113<H>  |  32<H>  ----------------------------<  173<H>  3.8   |  23  |  1.01    Ca    8.7      25 Oct 2022 06:37  Phos  4.0     10-25  Mg     2.1     10-25    TPro  6.1  /  Alb  1.7<L>  /  TBili  0.4  /  DBili  x   /  AST  14  /  ALT  25  /  AlkPhos  104  10-25        CAPILLARY BLOOD GLUCOSE      POCT Blood Glucose.: 181 mg/dL (25 Oct 2022 17:22)  POCT Blood Glucose.: 229 mg/dL (25 Oct 2022 11:42)  POCT Blood Glucose.: 171 mg/dL (25 Oct 2022 05:05)  POCT Blood Glucose.: 250 mg/dL (24 Oct 2022 23:27)        LIVER FUNCTIONS - ( 25 Oct 2022 06:37 )  Alb: 1.7 g/dL / Pro: 6.1 g/dL / ALK PHOS: 104 U/L / ALT: 25 U/L DA / AST: 14 U/L / GGT: x                  ICU Progress Note    HPI:    S:    Pt seen and examined  HD # 7  FULL CODE  PMHx of dementia, COPD, VDRF s/p trach peg, cardiac arrest, CVA, CKD, prior covid, nonverbal at baseline admitted on 10/18 for acute on chronic hypoxic respiratory failure due to pna with hospital course complicated by chronic anemia, RYAN, hyperkalemia, hyperglycemia, hyponatremia s/p 2 prbc. CT with bilateral moderate pleural effusions.    10/25 PM: Remains on 60% Fio2. TF remain held.    ROS: Unable to obtain 2/2 condition (trached)    Allergies    codeine (Hives)    Intolerances        MEDICATIONS  (STANDING):  ALBUTerol    90 MICROgram(s) HFA Inhaler 2 Puff(s) Inhalation every 6 hours  cadexomer iodine 0.9% Gel 1 Application(s) Topical <User Schedule>  cefTRIAXone   IVPB 1000 milliGRAM(s) IV Intermittent every 24 hours  cefTRIAXone   IVPB      chlorhexidine 0.12% Liquid 15 milliLiter(s) Oral Mucosa every 12 hours  chlorhexidine 2% Cloths 1 Application(s) Topical two times a day  dexMEDEtomidine Infusion 0.5 MICROgram(s)/kG/Hr (6.14 mL/Hr) IV Continuous <Continuous>  dextrose 5%. 1000 milliLiter(s) (100 mL/Hr) IV Continuous <Continuous>  dextrose 5%. 1000 milliLiter(s) (50 mL/Hr) IV Continuous <Continuous>  dextrose 50% Injectable 25 Gram(s) IV Push once  dextrose 50% Injectable 12.5 Gram(s) IV Push once  dextrose 50% Injectable 25 Gram(s) IV Push once  glucagon  Injectable 1 milliGRAM(s) IntraMuscular once  heparin   Injectable 5000 Unit(s) SubCutaneous every 12 hours  insulin lispro (ADMELOG) corrective regimen sliding scale   SubCutaneous every 6 hours  ipratropium 17 MICROgram(s) HFA Inhaler 1 Puff(s) Inhalation every 6 hours  levoFLOXacin IVPB 750 milliGRAM(s) IV Intermittent every 48 hours  LORazepam     Tablet 1 milliGRAM(s) Oral every 6 hours  midodrine 10 milliGRAM(s) Oral every 8 hours  pantoprazole    Tablet 40 milliGRAM(s) Oral before breakfast  povidone iodine 10% Solution 1 Application(s) Topical daily  simethicone 80 milliGRAM(s) Chew every 12 hours    MEDICATIONS  (PRN):  acetaminophen     Tablet .. 650 milliGRAM(s) Oral every 6 hours PRN Temp greater or equal to 38C (100.4F), Mild Pain (1 - 3)  aluminum hydroxide/magnesium hydroxide/simethicone Suspension 30 milliLiter(s) Oral every 4 hours PRN Dyspepsia  dextrose Oral Gel 15 Gram(s) Oral once PRN Blood Glucose LESS THAN 70 milliGRAM(s)/deciliter  fentaNYL    Injectable 25 MICROGram(s) IV Push every 2 hours PRN Severe Pain (7 - 10)  LORazepam   Injectable 1 milliGRAM(s) IV Push every 4 hours PRN Agitation  melatonin 3 milliGRAM(s) Oral at bedtime PRN Insomnia  ondansetron Injectable 4 milliGRAM(s) IV Push every 8 hours PRN Nausea and/or Vomiting      Drug Dosing Weight  Height (cm): 165.1 (18 Oct 2022 11:15)  Weight (kg): 49.1 (18 Oct 2022 15:00)  BMI (kg/m2): 18 (18 Oct 2022 15:00)  BSA (m2): 1.52 (18 Oct 2022 15:00)      PAST MEDICAL & SURGICAL HISTORY:  Dementia of frontal lobe type      Aphasic stroke      Diabetes mellitus      Respiratory failure      Hypertension      GERD (gastroesophageal reflux disease)      Constipation      Respiratory failure      CVA (cerebral vascular accident)      HTN (hypertension)      DM (diabetes mellitus)      Advanced dementia      COVID-19 virus detected      Quadriplegia      Pneumonia      Type II diabetes mellitus      Hx of appendectomy      Gastrostomy in place      Tracheostomy in place      Tracheostomy tube present      Feeding by G-tube          FAMILY HISTORY:          ROS: See HPI; otherwise, all systems reviewed and negative.    O:    ICU Vital Signs Last 24 Hrs  T(C): 37 (25 Oct 2022 16:25), Max: 37.2 (24 Oct 2022 21:00)  T(F): 98.6 (25 Oct 2022 16:25), Max: 98.9 (24 Oct 2022 21:00)  HR: 71 (25 Oct 2022 19:00) (66 - 100)  BP: 115/69 (25 Oct 2022 19:00) (99/60 - 141/75)  BP(mean): 84 (25 Oct 2022 19:00) (71 - 96)  ABP: --  ABP(mean): --  RR: 17 (25 Oct 2022 19:00) (16 - 32)  SpO2: 100% (25 Oct 2022 19:00) (94% - 100%)    O2 Parameters below as of 25 Oct 2022 19:00  Patient On (Oxygen Delivery Method): ventilator    O2 Concentration (%): 60            I&O's Detail    24 Oct 2022 07:01  -  25 Oct 2022 07:00  --------------------------------------------------------  IN:    Free Water: 1000 mL    IV PiggyBack: 150 mL    Nepro with Carb Steady: 960 mL  Total IN: 2110 mL    OUT:    Indwelling Catheter - Urethral (mL): 750 mL  Total OUT: 750 mL    Total NET: 1360 mL      25 Oct 2022 07:01  -  25 Oct 2022 19:24  --------------------------------------------------------  IN:    Free Water: 250 mL    IV PiggyBack: 50 mL    Nepro with Carb Steady: 200 mL  Total IN: 500 mL    OUT:    Indwelling Catheter - Urethral (mL): 300 mL  Total OUT: 300 mL    Total NET: 200 mL          Mode: AC/ CMV (Assist Control/ Continuous Mandatory Ventilation)  RR (machine): 18  TV (machine): 400  FiO2: 60  PEEP: 5  ITime: 1  MAP: 18  PIP: 35      PE:    Elderly F lying in bed  No JVD + trach midline  S1S2+  Coarse BS B/L  Abd softly distended + PEG in place  Some contracture to UE's  No LE edema/swelling noted  Non verbal, not following commands      LABS:    CBC Full  -  ( 25 Oct 2022 06:37 )  WBC Count : 15.63 K/uL  RBC Count : 3.11 M/uL  Hemoglobin : 8.6 g/dL  Hematocrit : 29.2 %  Platelet Count - Automated : 210 K/uL  Mean Cell Volume : 93.9 fl  Mean Cell Hemoglobin : 27.7 pg  Mean Cell Hemoglobin Concentration : 29.5 gm/dL  Auto Neutrophil # : 11.17 K/uL  Auto Lymphocyte # : 2.95 K/uL  Auto Monocyte # : 1.08 K/uL  Auto Eosinophil # : 0.17 K/uL  Auto Basophil # : 0.03 K/uL  Auto Neutrophil % : 71.4 %  Auto Lymphocyte % : 18.9 %  Auto Monocyte % : 6.9 %  Auto Eosinophil % : 1.1 %  Auto Basophil % : 0.2 %    10-25    146<H>  |  113<H>  |  32<H>  ----------------------------<  173<H>  3.8   |  23  |  1.01    Ca    8.7      25 Oct 2022 06:37  Phos  4.0     10-25  Mg     2.1     10-25    TPro  6.1  /  Alb  1.7<L>  /  TBili  0.4  /  DBili  x   /  AST  14  /  ALT  25  /  AlkPhos  104  10-25        CAPILLARY BLOOD GLUCOSE      POCT Blood Glucose.: 181 mg/dL (25 Oct 2022 17:22)  POCT Blood Glucose.: 229 mg/dL (25 Oct 2022 11:42)  POCT Blood Glucose.: 171 mg/dL (25 Oct 2022 05:05)  POCT Blood Glucose.: 250 mg/dL (24 Oct 2022 23:27)        LIVER FUNCTIONS - ( 25 Oct 2022 06:37 )  Alb: 1.7 g/dL / Pro: 6.1 g/dL / ALK PHOS: 104 U/L / ALT: 25 U/L DA / AST: 14 U/L / GGT: x

## 2022-10-25 NOTE — PROGRESS NOTE ADULT - ASSESSMENT
A:    76yFemale  HD #    Here for:    1.    This patient requires critical care for support of one or more vital organ systems with a high probability of imminent or life threatening deterioration in his/her condition    P:    Neuro: GCS 15. Monitor for delirium.  Continue to optimize pain control. Serial Neurologic assessments.    HEENT: No issues.    CV: Continue hemodynamic monitoring    Pulm: Pulmonary toilet.  Continue incentive spirometer.  Chest PT.  Encourage OOB to chair and ambulation. Nebs. f/u ABG, CXR.    GI/Nutrition: Cont diet, bowel regimen.    /Renal: Monitor UOP. Monitor BMP.  Replete Lytes as needed.    HEME- Chemical and mechanical DVT ppx. f/u CBC. f/u coags as needed.    ID:  Cont abx. f/u Cx's.    Lines/Tubes:     Endo: Maintain euglycemia.    Skin:  Cont skin care, pressure ulcer prevention.    Dispo: Cont critical care.    TOTAL CRITICAL CARE TIME:   (EXCLUSIVE of any non bundled procedures)    Note: This time spent INCLUDES time spent directly as this patient's bedside with evaluation, review of chart including review of laboratory and imaging studies, interpretation of vital signs and cardiac output measurements, any necessary ventilator management, and time spent discussing plan of care with patient and family, including goals of care discussion.   A:    76F  HD # 7  FULL CODE    Here for:    1. Acute on hypoxic resp failue 2/2  2. PNA  3. RYAN  4. Pleural effusions  5. UTI  6. Protein calorie malnutrition    This patient requires critical care for support of one or more vital organ systems with a high probability of imminent or life threatening deterioration in his/her condition    P:    PRN analgosedation, comfortable at this time, d/c precedex  HD monitoring, PRN pressors as needed to maintain MAP > 65, wean midodrine as able  On vent, VAC 18/400/5/.60; titrate FiO2 down to 50% now, continue titrating as able. CT chest 10/25 B/L pleural effusions, seem to be decreasing in size.  Abx CTX 1g IV qd  + levaquin 750mg IV q48hTx for PNA; + stenotrophomonas + psuedomonas last admission; WBC 15k, afebrile  Restart tube feeds, may benefit from pro kinetic agents  VTE ppx SQH PUD ppx PPI  Hgb 8.6, Plt 210, no s/s active bleeding  RYAN improved, no indication for emergent HD  f/u labs, replete lytes PRN  No central vascular access, maintain trach, PEG, vaughn  Optimize glucose control, goal BG < 180    Dispo: Cont critical care.    TOTAL CRITICAL CARE TIME: 36 minutes  (EXCLUSIVE of any non bundled procedures)    Note: This time spent INCLUDES time spent directly as this patient's bedside with evaluation, review of chart including review of laboratory and imaging studies, interpretation of vital signs and cardiac output measurements, any necessary ventilator management, and time spent discussing plan of care with patient and family, including goals of care discussion.

## 2022-10-25 NOTE — PROGRESS NOTE ADULT - ASSESSMENT
PEG dysfunction  adjusted again  likely has gastroparesis due to overall medical state  continue feeds

## 2022-10-25 NOTE — PROGRESS NOTE ADULT - SUBJECTIVE AND OBJECTIVE BOX
[facility], Division of Infectious Diseases  MO[facility] Lora Y. Patel, S. Shah, [facility]  [phone]    Name: BREA BECKHAM  Age: 76y  Gender: Female  MRN: [id number]    Interval History:  Patient seen and examined at bedside in [facility]  Notes reviewed    Antibiotics:  cefTRIAXone   IVPB 1000 milliGRAM(s) IV Intermittent every 24 hours  cefTRIAXone   IVPB      levoFLOXacin IVPB 750 milliGRAM(s) IV Intermittent every 48 hours      Medications:  acetaminophen     Tablet .. 650 milliGRAM(s) Oral every 6 hours PRN  ALBUTerol    90 MICROgram(s) HFA Inhaler 2 Puff(s) Inhalation every 6 hours  aluminum hydroxide/magnesium hydroxide/simethicone Suspension 30 milliLiter(s) Oral every 4 hours PRN  cadexomer iodine 0.9% Gel 1 Application(s) Topical <User Schedule>  cefTRIAXone   IVPB 1000 milliGRAM(s) IV Intermittent every 24 hours  cefTRIAXone   IVPB      chlorhexidine 0.12% Liquid 15 milliLiter(s) Oral Mucosa every 12 hours  chlorhexidine 2% Cloths 1 Application(s) Topical two times a day  dexMEDEtomidine Infusion 0.5 MICROgram(s)/kG/Hr IV Continuous <Continuous>  dextrose 5%. 1000 milliLiter(s) IV Continuous <Continuous>  dextrose 5%. 1000 milliLiter(s) IV Continuous <Continuous>  dextrose 50% Injectable 25 Gram(s) IV Push once  dextrose 50% Injectable 12.5 Gram(s) IV Push once  dextrose 50% Injectable 25 Gram(s) IV Push once  dextrose Oral Gel 15 Gram(s) Oral once PRN  fentaNYL    Injectable 25 MICROGram(s) IV Push every 2 hours PRN  glucagon  Injectable 1 milliGRAM(s) IntraMuscular once  heparin   Injectable 5000 Unit(s) SubCutaneous every 12 hours  insulin lispro (ADMELOG) corrective regimen sliding scale   SubCutaneous every 6 hours  ipratropium 17 MICROgram(s) HFA Inhaler 1 Puff(s) Inhalation every 6 hours  levoFLOXacin IVPB 750 milliGRAM(s) IV Intermittent every 48 hours  LORazepam     Tablet 1 milliGRAM(s) Oral every 6 hours  LORazepam   Injectable 1 milliGRAM(s) IV Push every 4 hours PRN  melatonin 3 milliGRAM(s) Oral at bedtime PRN  midodrine 10 milliGRAM(s) Oral every 8 hours  ondansetron Injectable 4 milliGRAM(s) IV Push every 8 hours PRN  pantoprazole    Tablet 40 milliGRAM(s) Oral before breakfast  povidone iodine 10% Solution 1 Application(s) Topical daily  simethicone 80 milliGRAM(s) Chew every 12 hours      Review of Systems:  unable to obtain    Allergies: codeine (Hives)    For details regarding the patient's past medical history, social history, family history, and other miscellaneous elements, please refer the initial infectious diseases consultation and/or the admitting history and physical examination for this admission.    Objective:  Vitals:   T(C): 36.7 (10-25-22 @ 08:00), Max: 37.2 (10-24-22 @ 21:00)  HR: 75 (10-25-22 @ 10:37) (66 - 100)  BP: 105/88 (10-25-22 @ 10:00) (99/55 - 141/75)  RR: 25 (10-25-22 @ 10:00) (18 - 32)  SpO2: 99% (10-25-22 @ 10:37) (84% - 100%)    Physical Examination:  General: NAD  HEENT: NC/AT, EOMI,   Neck: trach to vent  Lungs: MV breath sounds  Heart: Regular rate and rhythm. No murmur, rub or gallop.  Abdomen: Soft. Nondistended. Nontender.  Skin: Warm. Dry.    Laboratory Studies:  CBC:                       8.6    15.63 )-----------( 210      ( 25 Oct 2022 06:37 )             29.2     CMP: 10-25    146<H>  |  113<H>  |  32<H>  ----------------------------<  173<H>  3.8   |  23  |  1.01    Ca    8.7      25 Oct 2022 06:37  Phos  4.0     10-25  Mg     2.1     10-25    TPro  6.1  /  Alb  1.7<L>  /  TBili  0.4  /  DBili  x   /  AST  14  /  ALT  25  /  AlkPhos  104  10-25    LIVER FUNCTIONS - ( 25 Oct 2022 06:37 )  Alb: 1.7 g/dL / Pro: 6.1 g/dL / ALK PHOS: 104 U/L / ALT: 25 U/L DA / AST: 14 U/L / GGT: x               Microbiology: reviewed    Culture - Sputum (collected 10-19-22 @ 00:58)  Source: .Sputum Sputum trap  Gram Stain (10-19-22 @ 19:24):    Few Squamous epithelial cells per low power field    Few polymorphonuclear leukocytes per low power field    Few Gram positive cocci in pairs per oil power field  Final Report (10-22-22 @ 17:55):    Moderate Mixed gram negative rods including    Moderate Stenotrophomonas maltophilia  Organism: Stenotrophomonas maltophilia (10-22-22 @ 17:56)      -  Minocycline: S [id number]      Method Type: KB  Organism: Stenotrophomonas maltophilia (10-22-22 @ 17:56)      -  Levofloxacin: S 2      -  Trimethoprim/Sulfamethoxazole: S <=0.5/9.5      Method Type: PRATIK  Organism: Stenotrophomonas maltophilia (10-22-22 @ 17:55)    Culture - Urine (collected 10-18-22 @ 12:27)  Source: Clean Catch Clean Catch (Midstream)  Final Report (10-21-22 @ 08:18):    >100,000 CFU/ml Escherichia coli  Organism: Escherichia coli (10-21-22 @ 08:18)  Organism: Escherichia coli (10-21-22 @ 08:18)      -  Amikacin: S <=16      -  Amoxicillin/Clavulanic Acid: S <=8/4      -  Ampicillin: R >16 These ampicillin results predict results for amoxicillin      -  Ampicillin/Sulbactam: I 16/8 Enterobacter, Klebsiella aerogenes, Citrobacter, and Serratia may develop resistance during prolonged therapy (3-4 days)      -  Aztreonam: S <=4      -  Cefazolin: S <=2 For uncomplicated UTI with K. pneumoniae, E. coli, or P. mirablis: PRATIK <=16 is sensitive and PRATIK >=32 is resistant. This also predicts results for oral agents cefaclor, cefdinir, cefpodoxime, cefprozil, cefuroxime axetil, cephalexin and locarbef for uncomplicated UTI. Note that some isolates may be susceptible to these agents while testing resistant to cefazolin.      -  Cefepime: S <=2      -  Cefoxitin: S <=8      -  Ceftriaxone: S <=1 Enterobacter, Klebsiella aerogenes, Citrobacter, and Serratia may develop resistance during prolonged therapy      -  Ciprofloxacin: R >2      -  Ertapenem: I 1      -  Gentamicin: R >8      -  Imipenem: S <=1      -  Levofloxacin: R >4      -  Meropenem: S <=1      -  Nitrofurantoin: S <=32 Should not be used to treat pyelonephritis      -  Piperacillin/Tazobactam: S <=8      -  Tigecycline: S <=2      -  Tobramycin: I 8      -  Trimethoprim/Sulfamethoxazole: R >2/38      Method Type: PRATIK    Culture - Blood (collected 10-18-22 @ 12:00)  Source: .Blood Blood-Peripheral  Final Report (10-23-22 @ 17:00):    No Growth Final    Culture - Blood (collected 10-18-22 @ 12:00)  Source: .Blood Blood-Peripheral  Final Report (10-23-22 @ 17:00):    No Growth Final          Radiology: reviewed

## 2022-10-26 LAB
ALBUMIN SERPL ELPH-MCNC: 1.5 G/DL — LOW (ref 3.3–5)
ALP SERPL-CCNC: 96 U/L — SIGNIFICANT CHANGE UP (ref 30–120)
ALT FLD-CCNC: 18 U/L DA — SIGNIFICANT CHANGE UP (ref 10–60)
ANION GAP SERPL CALC-SCNC: 5 MMOL/L — SIGNIFICANT CHANGE UP (ref 5–17)
AST SERPL-CCNC: 12 U/L — SIGNIFICANT CHANGE UP (ref 10–40)
BILIRUB SERPL-MCNC: 0.2 MG/DL — SIGNIFICANT CHANGE UP (ref 0.2–1.2)
BUN SERPL-MCNC: 32 MG/DL — HIGH (ref 7–23)
CALCIUM SERPL-MCNC: 8.3 MG/DL — LOW (ref 8.4–10.5)
CHLORIDE SERPL-SCNC: 112 MMOL/L — HIGH (ref 96–108)
CO2 SERPL-SCNC: 26 MMOL/L — SIGNIFICANT CHANGE UP (ref 22–31)
CREAT SERPL-MCNC: 0.94 MG/DL — SIGNIFICANT CHANGE UP (ref 0.5–1.3)
EGFR: 62 ML/MIN/1.73M2 — SIGNIFICANT CHANGE UP
GLUCOSE BLDC GLUCOMTR-MCNC: 187 MG/DL — HIGH (ref 70–99)
GLUCOSE BLDC GLUCOMTR-MCNC: 200 MG/DL — HIGH (ref 70–99)
GLUCOSE BLDC GLUCOMTR-MCNC: 216 MG/DL — HIGH (ref 70–99)
GLUCOSE BLDC GLUCOMTR-MCNC: 224 MG/DL — HIGH (ref 70–99)
GLUCOSE SERPL-MCNC: 206 MG/DL — HIGH (ref 70–99)
HCT VFR BLD CALC: 26.8 % — LOW (ref 34.5–45)
HGB BLD-MCNC: 7.7 G/DL — LOW (ref 11.5–15.5)
MCHC RBC-ENTMCNC: 27 PG — SIGNIFICANT CHANGE UP (ref 27–34)
MCHC RBC-ENTMCNC: 28.7 GM/DL — LOW (ref 32–36)
MCV RBC AUTO: 94 FL — SIGNIFICANT CHANGE UP (ref 80–100)
NRBC # BLD: 0 /100 WBCS — SIGNIFICANT CHANGE UP (ref 0–0)
PLATELET # BLD AUTO: 162 K/UL — SIGNIFICANT CHANGE UP (ref 150–400)
POTASSIUM SERPL-MCNC: 3.9 MMOL/L — SIGNIFICANT CHANGE UP (ref 3.5–5.3)
POTASSIUM SERPL-SCNC: 3.9 MMOL/L — SIGNIFICANT CHANGE UP (ref 3.5–5.3)
PROT SERPL-MCNC: 5.7 G/DL — LOW (ref 6–8.3)
RBC # BLD: 2.85 M/UL — LOW (ref 3.8–5.2)
RBC # FLD: 19 % — HIGH (ref 10.3–14.5)
SODIUM SERPL-SCNC: 143 MMOL/L — SIGNIFICANT CHANGE UP (ref 135–145)
WBC # BLD: 11.65 K/UL — HIGH (ref 3.8–10.5)
WBC # FLD AUTO: 11.65 K/UL — HIGH (ref 3.8–10.5)

## 2022-10-26 PROCEDURE — 99233 SBSQ HOSP IP/OBS HIGH 50: CPT

## 2022-10-26 RX ORDER — FUROSEMIDE 40 MG
20 TABLET ORAL ONCE
Refills: 0 | Status: COMPLETED | OUTPATIENT
Start: 2022-10-26 | End: 2022-10-26

## 2022-10-26 RX ORDER — CEFTRIAXONE 500 MG/1
INJECTION, POWDER, FOR SOLUTION INTRAMUSCULAR; INTRAVENOUS
Refills: 0 | Status: DISCONTINUED | OUTPATIENT
Start: 2022-10-26 | End: 2022-10-30

## 2022-10-26 RX ORDER — CEFTRIAXONE 500 MG/1
1000 INJECTION, POWDER, FOR SOLUTION INTRAMUSCULAR; INTRAVENOUS ONCE
Refills: 0 | Status: COMPLETED | OUTPATIENT
Start: 2022-10-26 | End: 2022-10-26

## 2022-10-26 RX ORDER — CEFTRIAXONE 500 MG/1
1000 INJECTION, POWDER, FOR SOLUTION INTRAMUSCULAR; INTRAVENOUS EVERY 24 HOURS
Refills: 0 | Status: DISCONTINUED | OUTPATIENT
Start: 2022-10-27 | End: 2022-10-30

## 2022-10-26 RX ADMIN — Medication 1 MILLIGRAM(S): at 22:35

## 2022-10-26 RX ADMIN — Medication 4: at 17:13

## 2022-10-26 RX ADMIN — Medication 1 PUFF(S): at 19:59

## 2022-10-26 RX ADMIN — Medication 1 MILLIGRAM(S): at 17:09

## 2022-10-26 RX ADMIN — MIDODRINE HYDROCHLORIDE 10 MILLIGRAM(S): 2.5 TABLET ORAL at 05:23

## 2022-10-26 RX ADMIN — SIMETHICONE 80 MILLIGRAM(S): 80 TABLET, CHEWABLE ORAL at 17:08

## 2022-10-26 RX ADMIN — Medication 1 PUFF(S): at 02:45

## 2022-10-26 RX ADMIN — ALBUTEROL 2 PUFF(S): 90 AEROSOL, METERED ORAL at 19:59

## 2022-10-26 RX ADMIN — MIDODRINE HYDROCHLORIDE 10 MILLIGRAM(S): 2.5 TABLET ORAL at 21:28

## 2022-10-26 RX ADMIN — Medication 1 MILLIGRAM(S): at 09:58

## 2022-10-26 RX ADMIN — FENTANYL CITRATE 25 MICROGRAM(S): 50 INJECTION INTRAVENOUS at 15:50

## 2022-10-26 RX ADMIN — Medication 1 MILLIGRAM(S): at 11:19

## 2022-10-26 RX ADMIN — ALBUTEROL 2 PUFF(S): 90 AEROSOL, METERED ORAL at 13:10

## 2022-10-26 RX ADMIN — Medication 3 MILLIGRAM(S): at 21:27

## 2022-10-26 RX ADMIN — CHLORHEXIDINE GLUCONATE 1 APPLICATION(S): 213 SOLUTION TOPICAL at 05:22

## 2022-10-26 RX ADMIN — MIDODRINE HYDROCHLORIDE 10 MILLIGRAM(S): 2.5 TABLET ORAL at 14:10

## 2022-10-26 RX ADMIN — Medication 1 MILLIGRAM(S): at 05:23

## 2022-10-26 RX ADMIN — FENTANYL CITRATE 25 MICROGRAM(S): 50 INJECTION INTRAVENOUS at 15:35

## 2022-10-26 RX ADMIN — Medication 1 MILLIGRAM(S): at 03:21

## 2022-10-26 RX ADMIN — CHLORHEXIDINE GLUCONATE 1 APPLICATION(S): 213 SOLUTION TOPICAL at 17:09

## 2022-10-26 RX ADMIN — PANTOPRAZOLE SODIUM 40 MILLIGRAM(S): 20 TABLET, DELAYED RELEASE ORAL at 06:04

## 2022-10-26 RX ADMIN — CHLORHEXIDINE GLUCONATE 15 MILLILITER(S): 213 SOLUTION TOPICAL at 17:08

## 2022-10-26 RX ADMIN — ALBUTEROL 2 PUFF(S): 90 AEROSOL, METERED ORAL at 07:53

## 2022-10-26 RX ADMIN — Medication 1 PUFF(S): at 13:10

## 2022-10-26 RX ADMIN — Medication 1 PUFF(S): at 07:53

## 2022-10-26 RX ADMIN — HEPARIN SODIUM 5000 UNIT(S): 5000 INJECTION INTRAVENOUS; SUBCUTANEOUS at 17:08

## 2022-10-26 RX ADMIN — Medication 20 MILLIGRAM(S): at 20:46

## 2022-10-26 RX ADMIN — ALBUTEROL 2 PUFF(S): 90 AEROSOL, METERED ORAL at 02:45

## 2022-10-26 RX ADMIN — CEFTRIAXONE 1000 MILLIGRAM(S): 500 INJECTION, POWDER, FOR SOLUTION INTRAMUSCULAR; INTRAVENOUS at 15:39

## 2022-10-26 RX ADMIN — HEPARIN SODIUM 5000 UNIT(S): 5000 INJECTION INTRAVENOUS; SUBCUTANEOUS at 05:22

## 2022-10-26 RX ADMIN — Medication 2: at 06:05

## 2022-10-26 RX ADMIN — Medication 1 APPLICATION(S): at 11:19

## 2022-10-26 RX ADMIN — CHLORHEXIDINE GLUCONATE 15 MILLILITER(S): 213 SOLUTION TOPICAL at 05:22

## 2022-10-26 RX ADMIN — Medication 4: at 11:20

## 2022-10-26 RX ADMIN — Medication 1 APPLICATION(S): at 11:20

## 2022-10-26 RX ADMIN — SIMETHICONE 80 MILLIGRAM(S): 80 TABLET, CHEWABLE ORAL at 05:23

## 2022-10-26 NOTE — PROGRESS NOTE ADULT - ASSESSMENT
The patient is a 76 year old female with a history of HTN, DM, CVA, dementia, chronic respiratory failure s/p trach, PEG, cardiac arrest, anemia, pleural effusions who presents with hypoxia.     Plan:  - ECG with no evidence of ischemia or infarction  - Echo 8/22 with normal LV systolic function, mod pulm HTN, IVC small/collapsible  - CT chest with small bilateral pleural effusions, GGO, and infiltrates  - Pleural effusions previously were exudative, likely parapneumonic  - Continue midodrine 10 mg tid  - Procalcitonin significantly elevated  - IV antibiotics  - Monitor hemoglobin

## 2022-10-26 NOTE — PROGRESS NOTE ADULT - SUBJECTIVE AND OBJECTIVE BOX
Patient is a 76y old  Female who presents with a chief complaint of Acute on chronic hypoxemic respiratory failure.      INTERVAL HPI/OVERNIGHT EVENTS: Pt is nonverbal and cannot provide ROS. No acute events overnight.     MEDICATIONS  (STANDING):  ALBUTerol    90 MICROgram(s) HFA Inhaler 2 Puff(s) Inhalation every 6 hours  cadexomer iodine 0.9% Gel 1 Application(s) Topical <User Schedule>  cefTRIAXone   IVPB      chlorhexidine 0.12% Liquid 15 milliLiter(s) Oral Mucosa every 12 hours  chlorhexidine 2% Cloths 1 Application(s) Topical two times a day  dextrose 5%. 1000 milliLiter(s) (50 mL/Hr) IV Continuous <Continuous>  dextrose 5%. 1000 milliLiter(s) (100 mL/Hr) IV Continuous <Continuous>  dextrose 50% Injectable 25 Gram(s) IV Push once  dextrose 50% Injectable 12.5 Gram(s) IV Push once  dextrose 50% Injectable 25 Gram(s) IV Push once  glucagon  Injectable 1 milliGRAM(s) IntraMuscular once  heparin   Injectable 5000 Unit(s) SubCutaneous every 12 hours  insulin lispro (ADMELOG) corrective regimen sliding scale   SubCutaneous every 6 hours  ipratropium 17 MICROgram(s) HFA Inhaler 1 Puff(s) Inhalation every 6 hours  levoFLOXacin IVPB 750 milliGRAM(s) IV Intermittent every 48 hours  LORazepam     Tablet 1 milliGRAM(s) Oral every 6 hours  midodrine 10 milliGRAM(s) Oral every 8 hours  pantoprazole    Tablet 40 milliGRAM(s) Oral before breakfast  povidone iodine 10% Solution 1 Application(s) Topical daily  simethicone 80 milliGRAM(s) Chew every 12 hours    MEDICATIONS  (PRN):  acetaminophen     Tablet .. 650 milliGRAM(s) Oral every 6 hours PRN Temp greater or equal to 38C (100.4F), Mild Pain (1 - 3)  aluminum hydroxide/magnesium hydroxide/simethicone Suspension 30 milliLiter(s) Oral every 4 hours PRN Dyspepsia  dextrose Oral Gel 15 Gram(s) Oral once PRN Blood Glucose LESS THAN 70 milliGRAM(s)/deciliter  LORazepam   Injectable 1 milliGRAM(s) IV Push every 4 hours PRN Agitation  melatonin 3 milliGRAM(s) Oral at bedtime PRN Insomnia  ondansetron Injectable 4 milliGRAM(s) IV Push every 8 hours PRN Nausea and/or Vomiting      Allergies    codeine (Hives)    Intolerances        REVIEW OF SYSTEMS:  Pt is nonverbal and cannot provide ROS.    Vital Signs Last 24 Hrs  T(C): 36.7 (10-26-22 @ 12:30), Max: 37.6 (10-25-22 @ 21:10)  HR: 64 (10-26-22 @ 13:12) (63 - 90)  BP: 121/75 (10-26-22 @ 12:00) (109/60 - 137/81)  RR: 29 (10-26-22 @ 12:00) (17 - 31)  SpO2: 100% (10-26-22 @ 13:12) (86% - 100%)    Parameters below as of 26 Oct 2022 12:00  Patient On (Oxygen Delivery Method): ventilator    O2 Concentration (%): 60    PHYSICAL EXAM:  GENERAL: chronically ill-appearing, contracted  HEENT:  anicteric, dry mucous membranes ; trach with vent  CHEST/LUNG:  coarse breath sounds  HEART:  RRR, S1, S2  ABDOMEN:  +BS, soft, no apparent tenderness, distended, PEG in place  EXTREMITIES: limb contractures; no cyanosis or apparent calf tenderness  NERVOUS SYSTEM: answers questions and follows commands appropriately    LABS:                        7.7    11.65 )-----------( 162      ( 26 Oct 2022 06:23 )             26.8     CBC Full  -  ( 26 Oct 2022 06:23 )  WBC Count : 11.65 K/uL  Hemoglobin : 7.7 g/dL  Hematocrit : 26.8 %  Platelet Count - Automated : 162 K/uL  Mean Cell Volume : 94.0 fl  Mean Cell Hemoglobin : 27.0 pg  Mean Cell Hemoglobin Concentration : 28.7 gm/dL  Auto Neutrophil # : x  Auto Lymphocyte # : x  Auto Monocyte # : x  Auto Eosinophil # : x  Auto Basophil # : x  Auto Neutrophil % : x  Auto Lymphocyte % : x  Auto Monocyte % : x  Auto Eosinophil % : x  Auto Basophil % : x    26 Oct 2022 06:23    143    |  112    |  32     ----------------------------<  206    3.9     |  26     |  0.94     Ca    8.3        26 Oct 2022 06:23    TPro  5.7    /  Alb  1.5    /  TBili  0.2    /  DBili  x      /  AST  12     /  ALT  18     /  AlkPhos  96     26 Oct 2022 06:23        CAPILLARY BLOOD GLUCOSE      POCT Blood Glucose.: 224 mg/dL (26 Oct 2022 17:06)  POCT Blood Glucose.: 216 mg/dL (26 Oct 2022 11:09)  POCT Blood Glucose.: 187 mg/dL (26 Oct 2022 05:30)  POCT Blood Glucose.: 170 mg/dL (25 Oct 2022 23:20)          RADIOLOGY & ADDITIONAL TESTS:    Personally reviewed.     Consultant(s) Notes Reviewed:  [x] YES  [ ] NO     Patient is a 76y old  Female who presents with a chief complaint of Acute on chronic hypoxemic respiratory failure.      INTERVAL HPI/OVERNIGHT EVENTS: Pt is nonverbal and cannot provide ROS. No acute events overnight.     MEDICATIONS  (STANDING):  ALBUTerol    90 MICROgram(s) HFA Inhaler 2 Puff(s) Inhalation every 6 hours  cadexomer iodine 0.9% Gel 1 Application(s) Topical <User Schedule>  cefTRIAXone   IVPB      chlorhexidine 0.12% Liquid 15 milliLiter(s) Oral Mucosa every 12 hours  chlorhexidine 2% Cloths 1 Application(s) Topical two times a day  dextrose 5%. 1000 milliLiter(s) (50 mL/Hr) IV Continuous <Continuous>  dextrose 5%. 1000 milliLiter(s) (100 mL/Hr) IV Continuous <Continuous>  dextrose 50% Injectable 25 Gram(s) IV Push once  dextrose 50% Injectable 12.5 Gram(s) IV Push once  dextrose 50% Injectable 25 Gram(s) IV Push once  glucagon  Injectable 1 milliGRAM(s) IntraMuscular once  heparin   Injectable 5000 Unit(s) SubCutaneous every 12 hours  insulin lispro (ADMELOG) corrective regimen sliding scale   SubCutaneous every 6 hours  ipratropium 17 MICROgram(s) HFA Inhaler 1 Puff(s) Inhalation every 6 hours  levoFLOXacin IVPB 750 milliGRAM(s) IV Intermittent every 48 hours  LORazepam     Tablet 1 milliGRAM(s) Oral every 6 hours  midodrine 10 milliGRAM(s) Oral every 8 hours  pantoprazole    Tablet 40 milliGRAM(s) Oral before breakfast  povidone iodine 10% Solution 1 Application(s) Topical daily  simethicone 80 milliGRAM(s) Chew every 12 hours    MEDICATIONS  (PRN):  acetaminophen     Tablet .. 650 milliGRAM(s) Oral every 6 hours PRN Temp greater or equal to 38C (100.4F), Mild Pain (1 - 3)  aluminum hydroxide/magnesium hydroxide/simethicone Suspension 30 milliLiter(s) Oral every 4 hours PRN Dyspepsia  dextrose Oral Gel 15 Gram(s) Oral once PRN Blood Glucose LESS THAN 70 milliGRAM(s)/deciliter  LORazepam   Injectable 1 milliGRAM(s) IV Push every 4 hours PRN Agitation  melatonin 3 milliGRAM(s) Oral at bedtime PRN Insomnia  ondansetron Injectable 4 milliGRAM(s) IV Push every 8 hours PRN Nausea and/or Vomiting      Allergies    codeine (Hives)    Intolerances        REVIEW OF SYSTEMS:  Pt is nonverbal and cannot provide ROS.    Vital Signs Last 24 Hrs  T(C): 36.7 (10-26-22 @ 12:30), Max: 37.6 (10-25-22 @ 21:10)  HR: 64 (10-26-22 @ 13:12) (63 - 90)  BP: 121/75 (10-26-22 @ 12:00) (109/60 - 137/81)  RR: 29 (10-26-22 @ 12:00) (17 - 31)  SpO2: 100% (10-26-22 @ 13:12) (86% - 100%)    Parameters below as of 26 Oct 2022 12:00  Patient On (Oxygen Delivery Method): ventilator    O2 Concentration (%): 60    PHYSICAL EXAM:  GENERAL: chronically ill-appearing, contracted  HEENT:  anicteric, dry mucous membranes ; trach with vent  CHEST/LUNG:  decreased breath sounds in lower lung fields  HEART:  RRR, S1, S2  ABDOMEN:  +BS, soft, no apparent tenderness, distended, PEG in place  EXTREMITIES: limb contractures; no cyanosis or apparent calf tenderness  NERVOUS SYSTEM: answers questions and follows commands appropriately    LABS:                        7.7    11.65 )-----------( 162      ( 26 Oct 2022 06:23 )             26.8     CBC Full  -  ( 26 Oct 2022 06:23 )  WBC Count : 11.65 K/uL  Hemoglobin : 7.7 g/dL  Hematocrit : 26.8 %  Platelet Count - Automated : 162 K/uL  Mean Cell Volume : 94.0 fl  Mean Cell Hemoglobin : 27.0 pg  Mean Cell Hemoglobin Concentration : 28.7 gm/dL  Auto Neutrophil # : x  Auto Lymphocyte # : x  Auto Monocyte # : x  Auto Eosinophil # : x  Auto Basophil # : x  Auto Neutrophil % : x  Auto Lymphocyte % : x  Auto Monocyte % : x  Auto Eosinophil % : x  Auto Basophil % : x    26 Oct 2022 06:23    143    |  112    |  32     ----------------------------<  206    3.9     |  26     |  0.94     Ca    8.3        26 Oct 2022 06:23    TPro  5.7    /  Alb  1.5    /  TBili  0.2    /  DBili  x      /  AST  12     /  ALT  18     /  AlkPhos  96     26 Oct 2022 06:23        CAPILLARY BLOOD GLUCOSE      POCT Blood Glucose.: 216 mg/dL (26 Oct 2022 11:09)  POCT Blood Glucose.: 187 mg/dL (26 Oct 2022 05:30)  POCT Blood Glucose.: 170 mg/dL (25 Oct 2022 23:20)          RADIOLOGY & ADDITIONAL TESTS:    Personally reviewed.     Consultant(s) Notes Reviewed:  [x] YES  [ ] NO

## 2022-10-26 NOTE — PROGRESS NOTE ADULT - SUBJECTIVE AND OBJECTIVE BOX
[facility], Division of Infectious Diseases  [facility] Lora Y. Patel, S. Shah, G. [facility]  [phone]    Name: BREA BECKHAM  Age: 76y  Gender: Female  MRN: [id number]    Interval History:  Patient seen and examined at bedside   No acute overnight events. Afebrile  WBC trending down  Notes reviewed    Antibiotics:  cefTRIAXone   IVPB 1000 milliGRAM(s) IV Intermittent every 24 hours  cefTRIAXone   IVPB      levoFLOXacin IVPB 750 milliGRAM(s) IV Intermittent every 48 hours      Medications:  acetaminophen     Tablet .. 650 milliGRAM(s) Oral every 6 hours PRN  ALBUTerol    90 MICROgram(s) HFA Inhaler 2 Puff(s) Inhalation every 6 hours  aluminum hydroxide/magnesium hydroxide/simethicone Suspension 30 milliLiter(s) Oral every 4 hours PRN  cadexomer iodine 0.9% Gel 1 Application(s) Topical <User Schedule>  cefTRIAXone   IVPB 1000 milliGRAM(s) IV Intermittent every 24 hours  cefTRIAXone   IVPB      chlorhexidine 0.12% Liquid 15 milliLiter(s) Oral Mucosa every 12 hours  chlorhexidine 2% Cloths 1 Application(s) Topical two times a day  dextrose 5%. 1000 milliLiter(s) IV Continuous <Continuous>  dextrose 5%. 1000 milliLiter(s) IV Continuous <Continuous>  dextrose 50% Injectable 25 Gram(s) IV Push once  dextrose 50% Injectable 12.5 Gram(s) IV Push once  dextrose 50% Injectable 25 Gram(s) IV Push once  dextrose Oral Gel 15 Gram(s) Oral once PRN  fentaNYL    Injectable 25 MICROGram(s) IV Push every 2 hours PRN  glucagon  Injectable 1 milliGRAM(s) IntraMuscular once  heparin   Injectable 5000 Unit(s) SubCutaneous every 12 hours  insulin lispro (ADMELOG) corrective regimen sliding scale   SubCutaneous every 6 hours  ipratropium 17 MICROgram(s) HFA Inhaler 1 Puff(s) Inhalation every 6 hours  levoFLOXacin IVPB 750 milliGRAM(s) IV Intermittent every 48 hours  LORazepam     Tablet 1 milliGRAM(s) Oral every 6 hours  LORazepam   Injectable 1 milliGRAM(s) IV Push every 4 hours PRN  melatonin 3 milliGRAM(s) Oral at bedtime PRN  midodrine 10 milliGRAM(s) Oral every 8 hours  ondansetron Injectable 4 milliGRAM(s) IV Push every 8 hours PRN  pantoprazole    Tablet 40 milliGRAM(s) Oral before breakfast  povidone iodine 10% Solution 1 Application(s) Topical daily  simethicone 80 milliGRAM(s) Chew every 12 hours      Review of Systems:  unable to obtain    Allergies: codeine (Hives)    For details regarding the patient's past medical history, social history, family history, and other miscellaneous elements, please refer the initial infectious diseases consultation and/or the admitting history and physical examination for this admission.    Objective:  Vitals:   T(C): 36.7 (10-26-22 @ 12:30), Max: 37.6 (10-25-22 @ 21:10)  HR: 64 (10-26-22 @ 13:12) (63 - 90)  BP: 121/75 (10-26-22 @ 12:00) (109/60 - 137/81)  RR: 29 (10-26-22 @ 12:00) (17 - 31)  SpO2: 100% (10-26-22 @ 13:12) (86% - 100%)    Physical Examination:  General: NAD  HEENT: NC/AT, EOMI,   Neck: trach to vent  Lungs: MV breath sounds  Heart: Regular rate and rhythm. No murmur, rub or gallop.  Abdomen: Soft. Nondistended. Nontender.  Skin: Warm. Dry.      Laboratory Studies:  CBC:                       7.7    11.65 )-----------( 162      ( 26 Oct 2022 06:23 )             26.8     CMP: 10-26    143  |  112<H>  |  32<H>  ----------------------------<  206<H>  3.9   |  26  |  0.94    Ca    8.3<L>      26 Oct 2022 06:23  Phos  4.0     10-25  Mg     2.1     10-25    TPro  5.7<L>  /  Alb  1.5<L>  /  TBili  0.2  /  DBili  x   /  AST  12  /  ALT  18  /  AlkPhos  96  10-26    LIVER FUNCTIONS - ( 26 Oct 2022 06:23 )  Alb: 1.5 g/dL / Pro: 5.7 g/dL / ALK PHOS: 96 U/L / ALT: 18 U/L DA / AST: 12 U/L / GGT: x               Microbiology: reviewed    Culture - Sputum (collected 10-19-22 @ 00:58)  Source: .Sputum Sputum trap  Gram Stain (10-19-22 @ 19:24):    Few Squamous epithelial cells per low power field    Few polymorphonuclear leukocytes per low power field    Few Gram positive cocci in pairs per oil power field  Final Report (10-22-22 @ 17:55):    Moderate Mixed gram negative rods including    Moderate Stenotrophomonas maltophilia  Organism: Stenotrophomonas maltophilia (10-22-22 @ 17:56)      -  Minocycline: S [id number]      Method Type: KB  Organism: Stenotrophomonas maltophilia (10-22-22 @ 17:56)      -  Levofloxacin: S 2      -  Trimethoprim/Sulfamethoxazole: S <=0.5/9.5      Method Type: PRATIK  Organism: Stenotrophomonas maltophilia (10-22-22 @ 17:55)    Culture - Urine (collected 10-18-22 @ 12:27)  Source: Clean Catch Clean Catch (Midstream)  Final Report (10-21-22 @ 08:18):    >100,000 CFU/ml Escherichia coli  Organism: Escherichia coli (10-21-22 @ 08:18)  Organism: Escherichia coli (10-21-22 @ 08:18)      -  Amikacin: S <=16      -  Amoxicillin/Clavulanic Acid: S <=8/4      -  Ampicillin: R >16 These ampicillin results predict results for amoxicillin      -  Ampicillin/Sulbactam: I 16/8 Enterobacter, Klebsiella aerogenes, Citrobacter, and Serratia may develop resistance during prolonged therapy (3-4 days)      -  Aztreonam: S <=4      -  Cefazolin: S <=2 For uncomplicated UTI with K. pneumoniae, E. coli, or P. mirablis: PRATIK <=16 is sensitive and PRATIK >=32 is resistant. This also predicts results for oral agents cefaclor, cefdinir, cefpodoxime, cefprozil, cefuroxime axetil, cephalexin and locarbef for uncomplicated UTI. Note that some isolates may be susceptible to these agents while testing resistant to cefazolin.      -  Cefepime: S <=2      -  Cefoxitin: S <=8      -  Ceftriaxone: S <=1 Enterobacter, Klebsiella aerogenes, Citrobacter, and Serratia may develop resistance during prolonged therapy      -  Ciprofloxacin: R >2      -  Ertapenem: I 1      -  Gentamicin: R >8      -  Imipenem: S <=1      -  Levofloxacin: R >4      -  Meropenem: S <=1      -  Nitrofurantoin: S <=32 Should not be used to treat pyelonephritis      -  Piperacillin/Tazobactam: S <=8      -  Tigecycline: S <=2      -  Tobramycin: I 8      -  Trimethoprim/Sulfamethoxazole: R >2/38      Method Type: PRATIK    Culture - Blood (collected 10-18-22 @ 12:00)  Source: .Blood Blood-Peripheral  Final Report (10-23-22 @ 17:00):    No Growth Final    Culture - Blood (collected 10-18-22 @ 12:00)  Source: .Blood Blood-Peripheral  Final Report (10-23-22 @ 17:00):    No Growth Final          Radiology: reviewed

## 2022-10-26 NOTE — PROGRESS NOTE ADULT - SUBJECTIVE AND OBJECTIVE BOX
Chief Complaint: Hypoxia    Interval Events: No events overnight.    Review of Systems:  Unable to obtain    Physical Exam:  Vital Signs Last 24 Hrs  T(C): 36.7 (26 Oct 2022 06:00), Max: 37.6 (25 Oct 2022 21:10)  T(F): 98.1 (26 Oct 2022 06:00), Max: 99.6 (25 Oct 2022 21:10)  HR: 67 (26 Oct 2022 07:49) (63 - 85)  BP: 113/61 (26 Oct 2022 06:00) (105/88 - 124/82)  BP(mean): 75 (26 Oct 2022 06:00) (74 - 96)  RR: 31 (26 Oct 2022 06:00) (16 - 31)  SpO2: 99% (26 Oct 2022 07:49) (86% - 100%)  Parameters below as of 26 Oct 2022 07:45  Patient On (Oxygen Delivery Method): ventilator,50  General: Unresponsive  HEENT: Trach  Neck: No JVD, no carotid bruit  Lungs: CTAB  CV: RRR, nl S1/S2, no M/R/G  Abdomen: S/NT/ND, +BS  Extremities: No LE edema, no cyanosis  Neuro: AAOx0  Skin: No rash    Labs:    10-26    143  |  112<H>  |  32<H>  ----------------------------<  206<H>  3.9   |  26  |  0.94    Ca    8.3<L>      26 Oct 2022 06:23  Phos  4.0     10-25  Mg     2.1     10-25    TPro  5.7<L>  /  Alb  1.5<L>  /  TBili  0.2  /  DBili  x   /  AST  12  /  ALT  18  /  AlkPhos  96  10-26                        7.7    11.65 )-----------( 162      ( 26 Oct 2022 06:23 )             26.8       Telemetry: Sinus rhythm

## 2022-10-26 NOTE — PROGRESS NOTE ADULT - ASSESSMENT
REVIEW OF SYMPTOMS      Able to give (reliable) ROS  NO     PHYSICAL EXAM    HEENT Unremarkable  atraumatic   RESP Fair air entry EXP prolonged    Harsh breath sound Resp distres mild   CARDIAC S1 S2 No S3     NO JVD    ABDOMEN SOFT BS PRESENT NOT DISTENDED No hepatosplenomegaly   PEDAL EDEMA present No calf tenderness  NO rash       GENERAL DATA .   GOC.  10/18/2022 full code       ALLGY. codeine                            WT.          10/18/2022 48  BMI.          10/18/2022 17                     ICU STAY. 10/18/2022  COVID.  10/18/2022 scv2 (-)     BEST PRACTICE ISSUES.    HOB ELEVATN. Yes  DVT PPLX.   10/18/2022 hpsc    SQUIRES PPLX.  10/18/2022 protonix 40     INFN PPLX.    10/18/2022 ch;lorhexidine .12%  10/19/2022 chlorhexidine 2%    SP SW EM.        DIET.     10/19/2022 nepro 800 gt    VS/ PO/IO/ VENT/ DRIPS.  10/26/2022 afebr 76 110/70   10/26/2022 ac 18/400/60/5     ASSESSMENT/RECOMMENDATIONS .   RESP.  -- Gas exchange.   10/18/2022 ac 18/400/100/5 751/37/257  -- VENT MANAGEMENT.   HOB elevation  Target Pplat 30 (-)  Target PO 90-95%  Target pH 730 (+)  Daily spontaneous breathing trials   Daily sedation vacation   -- Pleuralk effsn  Past ritchie   R THORAC   6/8/2022  g 161 l 222 p 4.9 p 10 l 16 .38    L THORAC   5/25/2022 g 183 l 144/381 .37 p 3.4/5.3 .64 p 23 l 36   5/25/2022l pl fluid  lymph pred exudate   cyto (-)     Ct  10/18/2022 sm r mod l effs large subpulmonic component   a/r No thoracentesis plannd at this point risk prohibitive in vent pt   -- Mediastinal lne.  Ct  10/18/2022 again enlarged mediastinal lns likely reactive   a/r will need followup with ct in 2m and consider biopsy if persists   -- wheeze.  10/18 albuterol hf  10/18 atrovent hf   INFECTION.  PAST ID ISSUES   8/19/2022  zosyn Se Vignesh  8/19/2022 bactrim ds 2 bid   pmh 5/2/2022 SERRATIA CARBAPENEM RESISTANT PSEUDOMONAS  -- VAP 10/18/2022.  -- UTI 10/18/2022   w 10/18-10/19-10/20-10/21-10/23-10/25-10/512597 w 13 - 8.8 - 15-10.3 - 9.4- 12.3 - 15.6- 11.6    pr 10/20/2022 6.6    ua 10/18/2022 w 11-25   ct ch 10/18/2022 cw 8/18/20232 ggo post upper lobes with interlobular septal thickening infiltrate or atelectasis lower lobes l more than   sm r and sm to mod l effsn  rvp 10/18/2022 (-)   uc 10/18 100K E coli   sp 10/19 Stenotrophomonas   bc 10/18 (-)   10/18/2022 meropenem Dr NEWTON  dc  10/18/2022 ID Dr Brantley on case   10/19/2022 levaquin 750   10/21/2022 rocephin x 7d Dr BRITTANY Brantley      CARDIAC.  -- Hypotension.  10/18/2022 88/48   echo 8/19/2022 n lvsf dd1 pasp 58   10/20 midocrine 10.3   Target MAP 65 (+)   resolvd   GI.  -- ELEVATED LFTS.  LFTS 10/18-10/19-10/20/2022   ap 140-131- 104  ast - 44  alt 21 - 103 - 64   monitor  HEMAT.  -- Anemia.  Hb 10/18-10/19-10/20-10/21-10/22-10/23-10/24-10/25-10/26/2022 Hb 7.4 -  6.3 - 8.2  - 7.4 - 8 - 8.7 - 8.4 - 8.8 - 7.7   inr 10/18/2022 inr 1.23   ctap 10/20 No rp bl  Monitor  target Hb 7 (+)   -- TRANSFUSION  10/19/2022 2 U PRBC     OVERALL ASSESSMENT/DISPOSITION.  76 f PMH trach peg cva cac anoxic encephalo PH 8/19/2022 pasp 58 bl pl effs  r thora 6/8/2022 and l thora 5/25/2022 were lp exudates  recurrent hospitalizations HO CR psuedomonas 5/2/2022 zosyn 8/19/2022 admitted 10/18/2022 with resp distress     VAP   UTI   uc 10/18 100K E coli   sp 10/19 Stenotrophomonas   10/19/2022 levaquin 750   10/21/2022 rocephin x 7d Dr BRITTANY Brantley   Vent mgmt   Hypotension  10/20 midocrine 10.3  Anemia  10/19/2022 2 U PRBC   Woody 10/23/2022      TIME SPENT   Over 39 minutes aggregate critical care time spent on encounter; activities included   direct patient care, counseling and/or coordinating care reviewing notes, lab data/ imaging , discussion with multidisciplinary team/ patient  /family and explaining in detail risks, benefits, alternatives  of the recommendations     CHAPINCITO GUIDRY 76 f [facility] S 10/18/2022   DR ANG PADGETT

## 2022-10-26 NOTE — PROGRESS NOTE ADULT - SUBJECTIVE AND OBJECTIVE BOX
Date/Time Patient Seen:  		  Referring MD:   Data Reviewed	       Patient is a 76y old  Female who presents with a chief complaint of Acute on Hypoxemic respiratory failure (25 Oct 2022 19:24)      Subjective/HPI     PAST MEDICAL & SURGICAL HISTORY:  Dementia of frontal lobe type    Aphasic stroke    Diabetes mellitus    Respiratory failure    Hypertension    GERD (gastroesophageal reflux disease)    Constipation    Respiratory failure    CVA (cerebral vascular accident)    HTN (hypertension)    DM (diabetes mellitus)    Advanced dementia    COVID-19 virus detected    Quadriplegia    Pneumonia    Type II diabetes mellitus    Hx of appendectomy    Gastrostomy in place    Tracheostomy in place    Tracheostomy tube present    Feeding by G-tube          Medication list         MEDICATIONS  (STANDING):  ALBUTerol    90 MICROgram(s) HFA Inhaler 2 Puff(s) Inhalation every 6 hours  cadexomer iodine 0.9% Gel 1 Application(s) Topical <User Schedule>  cefTRIAXone   IVPB 1000 milliGRAM(s) IV Intermittent every 24 hours  cefTRIAXone   IVPB      chlorhexidine 0.12% Liquid 15 milliLiter(s) Oral Mucosa every 12 hours  chlorhexidine 2% Cloths 1 Application(s) Topical two times a day  dextrose 5%. 1000 milliLiter(s) (50 mL/Hr) IV Continuous <Continuous>  dextrose 5%. 1000 milliLiter(s) (100 mL/Hr) IV Continuous <Continuous>  dextrose 50% Injectable 25 Gram(s) IV Push once  dextrose 50% Injectable 12.5 Gram(s) IV Push once  dextrose 50% Injectable 25 Gram(s) IV Push once  glucagon  Injectable 1 milliGRAM(s) IntraMuscular once  heparin   Injectable 5000 Unit(s) SubCutaneous every 12 hours  insulin lispro (ADMELOG) corrective regimen sliding scale   SubCutaneous every 6 hours  ipratropium 17 MICROgram(s) HFA Inhaler 1 Puff(s) Inhalation every 6 hours  levoFLOXacin IVPB 750 milliGRAM(s) IV Intermittent every 48 hours  LORazepam     Tablet 1 milliGRAM(s) Oral every 6 hours  midodrine 10 milliGRAM(s) Oral every 8 hours  pantoprazole    Tablet 40 milliGRAM(s) Oral before breakfast  povidone iodine 10% Solution 1 Application(s) Topical daily  simethicone 80 milliGRAM(s) Chew every 12 hours    MEDICATIONS  (PRN):  acetaminophen     Tablet .. 650 milliGRAM(s) Oral every 6 hours PRN Temp greater or equal to 38C (100.4F), Mild Pain (1 - 3)  aluminum hydroxide/magnesium hydroxide/simethicone Suspension 30 milliLiter(s) Oral every 4 hours PRN Dyspepsia  dextrose Oral Gel 15 Gram(s) Oral once PRN Blood Glucose LESS THAN 70 milliGRAM(s)/deciliter  fentaNYL    Injectable 25 MICROGram(s) IV Push every 2 hours PRN Severe Pain (7 - 10)  LORazepam   Injectable 1 milliGRAM(s) IV Push every 4 hours PRN Agitation  melatonin 3 milliGRAM(s) Oral at bedtime PRN Insomnia  ondansetron Injectable 4 milliGRAM(s) IV Push every 8 hours PRN Nausea and/or Vomiting         Vitals log        ICU Vital Signs Last 24 Hrs  T(C): 36.7 (26 Oct 2022 06:00), Max: 37.6 (25 Oct 2022 21:10)  T(F): 98.1 (26 Oct 2022 06:00), Max: 99.6 (25 Oct 2022 21:10)  HR: 66 (26 Oct 2022 05:28) (63 - 100)  BP: 122/60 (26 Oct 2022 02:00) (102/64 - 141/75)  BP(mean): 79 (26 Oct 2022 02:00) (74 - 96)  ABP: --  ABP(mean): --  RR: 18 (26 Oct 2022 02:00) (16 - 28)  SpO2: 95% (26 Oct 2022 05:28) (86% - 100%)    O2 Parameters below as of 26 Oct 2022 02:00  Patient On (Oxygen Delivery Method): ventilator    O2 Concentration (%): 70         Mode: AC/ CMV (Assist Control/ Continuous Mandatory Ventilation)  RR (machine): 18  TV (machine): 400  FiO2: 50  PEEP: 5  ITime: 1  MAP: 15  PIP: 33      Input and Output:  I&O's Detail    24 Oct 2022 07:01  -  25 Oct 2022 07:00  --------------------------------------------------------  IN:    Free Water: 1000 mL    IV PiggyBack: 150 mL    Nepro with Carb Steady: 960 mL  Total IN: 2110 mL    OUT:    Indwelling Catheter - Urethral (mL): 750 mL  Total OUT: 750 mL    Total NET: 1360 mL      25 Oct 2022 07:01  -  26 Oct 2022 06:30  --------------------------------------------------------  IN:    Free Water: 530 mL    IV PiggyBack: 50 mL    Nepro with Carb Steady: 520 mL  Total IN: 1100 mL    OUT:    Indwelling Catheter - Urethral (mL): 300 mL  Total OUT: 300 mL    Total NET: 800 mL          Lab Data                        7.7    11.65 )-----------( 162      ( 26 Oct 2022 06:23 )             26.8     10-25    146<H>  |  113<H>  |  32<H>  ----------------------------<  173<H>  3.8   |  23  |  1.01    Ca    8.7      25 Oct 2022 06:37  Phos  4.0     10-25  Mg     2.1     10-25    TPro  6.1  /  Alb  1.7<L>  /  TBili  0.4  /  DBili  x   /  AST  14  /  ALT  25  /  AlkPhos  104  10-25            Review of Systems	      Objective     Physical Examination    heart s1s2  lung dec BS  head nc      Pertinent Lab findings & Imaging      Annalise:  NO   Adequate UO     I&O's Detail    24 Oct 2022 07:01  -  25 Oct 2022 07:00  --------------------------------------------------------  IN:    Free Water: 1000 mL    IV PiggyBack: 150 mL    Nepro with Carb Steady: 960 mL  Total IN: 2110 mL    OUT:    Indwelling Catheter - Urethral (mL): 750 mL  Total OUT: 750 mL    Total NET: 1360 mL      25 Oct 2022 07:01  -  26 Oct 2022 06:30  --------------------------------------------------------  IN:    Free Water: 530 mL    IV PiggyBack: 50 mL    Nepro with Carb Steady: 520 mL  Total IN: 1100 mL    OUT:    Indwelling Catheter - Urethral (mL): 300 mL  Total OUT: 300 mL    Total NET: 800 mL               Discussed with:     Cultures:	        Radiology

## 2022-10-26 NOTE — PROGRESS NOTE ADULT - ASSESSMENT
75yo F with PMH of dementia, COPD with chronic resp failure and is vent dependent, s/p PEG, hx of cardiac arrest, diastolic CHF, cor pulmonale, hx of CVA, COVID-19 infxn, CKD, anemia, multiple admissions for respiratory distress (recent admission from 6/1-6/9 diagnosed with PNA with parapneumonic pleural effusion s/p thoracentesis and Avycaz) who presents from [facility] for respiratory distress again a/w sepsis and respiratory failure due to suspected recurrent gram-negative PNA.    Severe sepsis and acute hypoxic respiratory failure 2/2 gram-negative PNA   - continue current vent settings (on AC with RR 16, , PEEP 5, FiO2 100%) maintain O2 sat >92%  - was on ertapenem,  zosyn+bactrim , now on levaquin  - lactate downtrending, procalcitonin high but improving with treatment (4.76 -> 3.34 -> 1.77)  - cautious use of fluids given hx of CHF (received 1750cc of NS in ED)  - f/u blood cultures (NGTD), urine culture pending   - f/u trach sputum culture - has GNR growing awaiting speciation / sensitivities  - has hx of respiratory distress driven by vent asynchrony and would require IV benzos ; trialed IV fentanyl with adequate effect this evening  - Checked CT Abd/P to eval for any other potential source sign of infection and found no significant sign of intra-abdominal infection on CT  - cc/pulm consult (Jaime), recs appreciated  - palliative care (Emre), recs appreciated - pt is full code  C/w levofloxacin 750mg q48h based on most recent cx per ID recs  ID added ceftriaxone 1gm q24h for possible UTI/CAUTI  WBC worsening today, no overnight events    Diastolic CHF  Hx of Pleural effusions  - last TTE was 5/30 with EF 65% with normal LVSF, dilated   RV, and moderate pHTN -> repeat TTE appears unchanged  - may need repeat thoracentesis (had 1.5L drained three admissions ago), pulmonology consulted; was parapneumonic on that admission as opposed to transudative and thus less likely related to CHF    hyperkalemia RYAN likely due to sepsis  will give bolus of fluids: careful use going forward.  repeat Cr and K    Anemia of chronic disease  - has been evaluated by GI and hematology in the past -> dx with ACD with intermittent GI bleeding  - s/p 2 unit PRBC   - current HB stable    Hyperkalemia  - given both insulin and lasix   still remains elevated  nutrition consulted    Hx of CKD stage 3 - near baseline of 1.2-1.3  - renally dose medications and monitor BMP and electrolytes   - Cr is 1.3 with eGFR of 41, but given pt has low muscle mass from being bedbound for years, this GFR estimate is likely falsely high  - consider nephro eval   nutrition consulted    HTN  - not on any BP meds at facility  - monitor VS    DM2  - NPO with TF  - continue with insulin 8units qAM as per last admission  - c/w moderate insulin coverage scale  - goal -180      Diet  - TF on hold 2/2 leak around site, will need GI to eval before restarting  contacted Dr. Dupree today, he will evaluate    Preventive measures  - cont with heparin subq for DVT ppx  - Full Code    75yo F with PMH of dementia, COPD with chronic resp failure and is vent dependent, s/p PEG, hx of cardiac arrest, diastolic CHF, cor pulmonale, hx of CVA, COVID-19 infxn, CKD, anemia, multiple admissions for respiratory failure (recent admissions in 06/2022 and 08/2022 diagnosed with PNA with parapneumonic pleural effusion s/p thoracentesis and Avycaz) who presents from [facility] for respiratory distress again a/w sepsis and acute on chronic hypoxic respiratory failure due to suspected recurrent VAP.    Severe sepsis and acute hypoxic respiratory failure 2/2 gram-negative PNA   - titrating down FiO2 on vent as able - on 60% now  - had multiple broad spectrum Abx regimens; now completing levaquin per ID and will c/w just rocephin  - ID ([facility]), recs appreciated   - procalcitonin high but improving 23 ->6.6 within a couple of days of starting Abx  - cautious use of fluids given hx of CHF and severe hypoalbuminemia (received 1750cc of NS in ED)  - f/u blood cultures (NGTD), urine culture growing E coli sensitive to rocephin  - f/u trach sputum culture - grew Stenotrophomonas sensitive to levaquin  - Checked CT Abd/P to eval for any other potential source sign of infection and found no significant sign of intra-abdominal infection on CT  - c/w midodrine  - cc/pulm consult (Jaime), recs appreciated  - palliative care (Emre), recs appreciated - pt is full code  - leukocytosis improving    Diastolic CHF  Hx of Pleural effusions  - last TTE was 5/30 with EF 65% with normal LVSF, dilated RV, and moderate pHTN -> repeat TTE appears unchanged  - pt also with severe hypoalbuminemia   - may need repeat thoracentesis (had 1.5L drained few admissions ago), pulmonology consulted; pl effusion was parapneumonic on past admission as opposed to transudative and thus less likely related to CHF    hyperkalemia / RYAN on CKD 3  - likely in part ATN due to sepsis and in part prerenal   - renal function recovered to baseline CKD 3  - monitor BMP    Anemia of chronic disease  - has been evaluated by GI and hematology in the past -> dx with anemia of chronic disease with intermittent GI bleeding  - s/p 2 unit PRBC   - current Hgb trended down to 7.6; monitor for possible need for another PRBC  - no jacqueline GI bleed per nursing     Hx of CKD stage 3 - near baseline of 1.2-1.3  - renally dose medications and monitor BMP and electrolytes   - given pt has low muscle mass from being bedbound for years, this GFR estimate is likely falsely high  - nutrition consulted    HTN  - not on any BP meds at facility  - monitor VS    DM2  - NPO with TF  - c/w moderate dose lispro coverage sliding scale q6h  - goal -180 - mildly above goal - consider starting low dose lantus tomorrow morning if FSG remain elevated      Diet  - TF was on hold 2/2 leak around site, GI readjusted and not leaking currently  - GI (Leia/ group), recs appreciated     Preventive measures  - cont with heparin subq for DVT ppx  - Full Code

## 2022-10-26 NOTE — PROGRESS NOTE ADULT - SUBJECTIVE AND OBJECTIVE BOX
BREA BECKHAM     [facility]    Allergies    codeine (Hives)    Intolerances        PAST MEDICAL & SURGICAL HISTORY:  Dementia of frontal lobe type      Aphasic stroke      Diabetes mellitus      Respiratory failure      Hypertension      GERD (gastroesophageal reflux disease)      Constipation      Respiratory failure      CVA (cerebral vascular accident)      HTN (hypertension)      DM (diabetes mellitus)      Advanced dementia      COVID-19 virus detected      Quadriplegia      Pneumonia      Type II diabetes mellitus      Hx of appendectomy      Gastrostomy in place      Tracheostomy in place      Tracheostomy tube present      Feeding by G-tube          FAMILY HISTORY:      Home Medications:  albuterol 90 mcg/inh inhalation aerosol with adapter: 2  inhaled every 6 hours (18 Oct 2022 11:42)  Bacid (LAC) oral tablet: 2 tab(s) by gastrostomy tube once a day (18 Oct 2022 11:42)  Betadine 10% topical swab: cleanse right and left great toes (18 Oct 2022 11:42)  chlorhexidine 0.12% mucous membrane liquid: 15 milliliter(s) mucous membrane 2 times a day (18 Oct 2022 11:42)  Eucerin topical cream: Apply topically to affected area once a day bilateral feet (18 Oct 2022 11:42)  insulin glargine 100 units/mL subcutaneous solution: 8 unit(s) subcutaneous once a day (in the morning) (18 Oct 2022 11:42)  ipratropium-albuterol 0.5 mg-2.5 mg/3 mL inhalation solution: 3 milliliter(s) inhaled 4 times a day (18 Oct 2022 11:42)  LORazepam 1 mg oral tablet: 1 tab(s) by gastrostomy tube every 4 hours (18 Oct 2022 11:42)  methocarbamol 500 mg oral tablet: 1 tab(s) by gastrostomy tube 2 times a day (18 Oct 2022 11:42)  MiraLax oral powder for reconstitution: g-tube (18 Oct 2022 13:04)  Multiple Vitamins oral tablet: 1 tab(s) orally once a day (18 Oct 2022 11:42)  nystatin 100,000 units/g topical powder: 1 application topically 3 times a day (18 Oct 2022 11:42)  omeprazole 20 mg oral delayed release capsule: orally 2 times a day (18 Oct 2022 11:42)  polyethylene glycol 3350 oral powder for reconstitution: 17 gram(s) by gastrostomy tube every 12 hours (18 Oct 2022 11:42)  senna 8.6 mg oral tablet: 3 tab(s) by gastrostomy tube once a day (at bedtime) (18 Oct 2022 11:42)  simethicone 80 mg oral tablet: g-tube (18 Oct 2022 13:04)  simethicone 80 mg oral tablet, chewable: 1 tab(s) by gastrostomy tube every 6 hours (18 Oct 2022 11:42)  sucralfate 1 g/10 mL oral suspension: 10 milliliter(s) g-tube 4 times a day (before meals and at bedtime) (18 Oct 2022 13:04)  Tylenol 325 mg oral tablet: 2 tab(s) by gastrostomy tube once a day; 60 minutes prior to dressing change  (18 Oct 2022 11:42)  Tylenol 325 mg oral tablet: 2 tab(s) by gastrostomy tube every 6 hours, As Needed (18 Oct 2022 11:42)      MEDICATIONS  (STANDING):  ALBUTerol    90 MICROgram(s) HFA Inhaler 2 Puff(s) Inhalation every 6 hours  cadexomer iodine 0.9% Gel 1 Application(s) Topical <User Schedule>  cefTRIAXone   IVPB 1000 milliGRAM(s) IV Intermittent every 24 hours  cefTRIAXone   IVPB      chlorhexidine 0.12% Liquid 15 milliLiter(s) Oral Mucosa every 12 hours  chlorhexidine 2% Cloths 1 Application(s) Topical two times a day  dextrose 5%. 1000 milliLiter(s) (50 mL/Hr) IV Continuous <Continuous>  dextrose 5%. 1000 milliLiter(s) (100 mL/Hr) IV Continuous <Continuous>  dextrose 50% Injectable 25 Gram(s) IV Push once  dextrose 50% Injectable 12.5 Gram(s) IV Push once  dextrose 50% Injectable 25 Gram(s) IV Push once  glucagon  Injectable 1 milliGRAM(s) IntraMuscular once  heparin   Injectable 5000 Unit(s) SubCutaneous every 12 hours  insulin lispro (ADMELOG) corrective regimen sliding scale   SubCutaneous every 6 hours  ipratropium 17 MICROgram(s) HFA Inhaler 1 Puff(s) Inhalation every 6 hours  levoFLOXacin IVPB 750 milliGRAM(s) IV Intermittent every 48 hours  LORazepam     Tablet 1 milliGRAM(s) Oral every 6 hours  midodrine 10 milliGRAM(s) Oral every 8 hours  pantoprazole    Tablet 40 milliGRAM(s) Oral before breakfast  povidone iodine 10% Solution 1 Application(s) Topical daily  simethicone 80 milliGRAM(s) Chew every 12 hours    MEDICATIONS  (PRN):  acetaminophen     Tablet .. 650 milliGRAM(s) Oral every 6 hours PRN Temp greater or equal to 38C (100.4F), Mild Pain (1 - 3)  aluminum hydroxide/magnesium hydroxide/simethicone Suspension 30 milliLiter(s) Oral every 4 hours PRN Dyspepsia  dextrose Oral Gel 15 Gram(s) Oral once PRN Blood Glucose LESS THAN 70 milliGRAM(s)/deciliter  fentaNYL    Injectable 25 MICROGram(s) IV Push every 2 hours PRN Severe Pain (7 - 10)  LORazepam   Injectable 1 milliGRAM(s) IV Push every 4 hours PRN Agitation  melatonin 3 milliGRAM(s) Oral at bedtime PRN Insomnia  ondansetron Injectable 4 milliGRAM(s) IV Push every 8 hours PRN Nausea and/or Vomiting      Diet, NPO with Tube Feed:   Tube Feeding Modality: Gastrostomy  Nepro with Carb Steady  Total Volume for 24 Hours (mL): 800  Continuous  Starting Tube Feed Rate mL per Hour: 40  Until Goal Tube Feed Rate (mL per Hour): 40  Tube Feed Duration (in Hours): 20  Tube Feed Start Time: 11:00  Free Water Flush  Pump   Rate (mL per Hour): 35   Frequency: Every Hour    Duration (Hours): 24    Start Time: 11:00  Lucas(7 Gm Arginine/7 Gm Glut/1.2 Gm HMB     Qty per Day:  1 (10-25-22 @ 10:53) [Active]          Vital Signs Last 24 Hrs  T(C): 36.7 (26 Oct 2022 06:00), Max: 37.6 (25 Oct 2022 21:10)  T(F): 98.1 (26 Oct 2022 06:00), Max: 99.6 (25 Oct 2022 21:10)  HR: 73 (26 Oct 2022 06:00) (63 - 100)  BP: 113/61 (26 Oct 2022 06:00) (102/64 - 141/75)  BP(mean): 75 (26 Oct 2022 06:00) (74 - 96)  RR: 31 (26 Oct 2022 06:00) (16 - 31)  SpO2: 98% (26 Oct 2022 06:00) (86% - 100%)    Parameters below as of 26 Oct 2022 06:00  Patient On (Oxygen Delivery Method): ventilator    O2 Concentration (%): 70      10-25-22 @ 07:01  -  10-26-22 @ 07:00  --------------------------------------------------------  IN: 1175 mL / OUT: 700 mL / NET: 475 mL        Mode: AC/ CMV (Assist Control/ Continuous Mandatory Ventilation), RR (machine): 18, TV (machine): 400, FiO2: 50, PEEP: 5, ITime: 1, MAP: 15, PIP: 33      LABS:                        7.7    11.65 )-----------( 162      ( 26 Oct 2022 06:23 )             26.8     10-26    143  |  112<H>  |  32<H>  ----------------------------<  206<H>  3.9   |  26  |  0.94    Ca    8.3<L>      26 Oct 2022 06:23  Phos  4.0     10-25  Mg     2.1     10-25    TPro  5.7<L>  /  Alb  1.5<L>  /  TBili  0.2  /  DBili  x   /  AST  12  /  ALT  18  /  AlkPhos  96  10-26              WBC:  WBC Count: 11.65 K/uL (10-26 @ 06:23)  WBC Count: 15.63 K/uL (10-25 @ 06:37)  WBC Count: 13.05 K/uL (10-24 @ 07:04)  WBC Count: 12.38 K/uL (10-23 @ 06:00)      MICROBIOLOGY:  RECENT CULTURES:                  Sodium:  Sodium, Serum: 143 mmol/L (10-26 @ 06:23)  Sodium, Serum: 146 mmol/L (10-25 @ 06:37)  Sodium, Serum: 146 mmol/L (10-24 @ 07:04)  Sodium, Serum: 146 mmol/L (10-23 @ 06:00)      0.94 mg/dL 10-26 @ 06:23  1.01 mg/dL 10-25 @ 06:37  1.13 mg/dL 10-24 @ 07:04  1.25 mg/dL 10-23 @ 06:00      Hemoglobin:  Hemoglobin: 7.7 g/dL (10-26 @ 06:23)  Hemoglobin: 8.6 g/dL (10-25 @ 06:37)  Hemoglobin: 8.4 g/dL (10-24 @ 07:04)  Hemoglobin: 8.7 g/dL (10-23 @ 06:00)      Platelets: Platelet Count - Automated: 162 K/uL (10-26 @ 06:23)  Platelet Count - Automated: 210 K/uL (10-25 @ 06:37)  Platelet Count - Automated: 224 K/uL (10-24 @ 07:04)  Platelet Count - Automated: 283 K/uL (10-23 @ 06:00)      LIVER FUNCTIONS - ( 26 Oct 2022 06:23 )  Alb: 1.5 g/dL / Pro: 5.7 g/dL / ALK PHOS: 96 U/L / ALT: 18 U/L DA / AST: 12 U/L / GGT: x                 RADIOLOGY & ADDITIONAL STUDIES:      MICROBIOLOGY:  RECENT CULTURES:

## 2022-10-26 NOTE — PROGRESS NOTE ADULT - ASSESSMENT
Pt is a 76W w/ PMHx of DM2, COPD, HTN, dementia, hx of subarachnoid hemorrhage, and history of chronic trach brought in by ambulance for evaluation of low CO2 and cyanotic appearance.   Well-known and has had multiple admissions for Acute on chronic RF and PNA w/ MDROs    Acute VAP/PNA  Acute on chronic HF  CAUTI/UTI  - pt p/w cyanosis  - most recent admission in end of August, most recent cx w/ Pseudomonas and Stenotrophomonas  - labs notable for leukocytosis to 13  - CT chest Groundglass opacities and interlobular septal thickening suggestive of CHF. 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    D/w Dr. Irwin    Infectious Diseases will continue to follow. Please call with any questions.   Andressa Brantley M.D.  [facility] Division of Infectious Diseases [phone]

## 2022-10-26 NOTE — PROGRESS NOTE ADULT - SUBJECTIVE AND OBJECTIVE BOX
CC:  Patient is a 76y old  Female who presents with a chief complaint of Acute on Hypoxemic respiratory failure (26 Oct 2022 14:50)      HPI/BRIEF HOSPITAL COURSE:   75 y/o F with PMHx of dementia, COPD, VDRF s/p trach peg, cardiac arrest, CVA, CKD, prior covid, nonverbal at baseline admitted on 10/18 for acute on chronic hypoxic respiratory failure due to pna with hospital course complicated by chronic anemia, RYAN, hyperkalemia, hyperglycemia, hyponatremia s/p 2 prbc. CT with bilateral moderate pleural effusions. Repeat CT showed stable/ slightly smaller pleural effusions, no indication for thoracentesis at this time.       Events last 24 hours:   Wean to 50 % Fio2, Continues to be HD stable. POCUS of lung fields at bedside showed diffuse B lines in all lung fields, possible CHF component vs PNA.   PAST MEDICAL & SURGICAL HISTORY:  Dementia of frontal lobe type      Aphasic stroke      Diabetes mellitus      Respiratory failure      Hypertension      GERD (gastroesophageal reflux disease)      Constipation      Respiratory failure      CVA (cerebral vascular accident)      HTN (hypertension)      DM (diabetes mellitus)      Advanced dementia      COVID-19 virus detected      Quadriplegia      Pneumonia      Type II diabetes mellitus      Hx of appendectomy      Gastrostomy in place      Tracheostomy in place      Tracheostomy tube present      Feeding by G-tube        Allergies    codeine (Hives)    Intolerances      FAMILY HISTORY:      Review of Systems:  Negative 2/2 to vent     Medications:  cefTRIAXone   IVPB        midodrine 10 milliGRAM(s) Oral every 8 hours    ALBUTerol    90 MICROgram(s) HFA Inhaler 2 Puff(s) Inhalation every 6 hours  ipratropium 17 MICROgram(s) HFA Inhaler 1 Puff(s) Inhalation every 6 hours    acetaminophen     Tablet .. 650 milliGRAM(s) Oral every 6 hours PRN  LORazepam     Tablet 1 milliGRAM(s) Oral every 6 hours  LORazepam   Injectable 1 milliGRAM(s) IV Push every 4 hours PRN  melatonin 3 milliGRAM(s) Oral at bedtime PRN  ondansetron Injectable 4 milliGRAM(s) IV Push every 8 hours PRN      heparin   Injectable 5000 Unit(s) SubCutaneous every 12 hours    aluminum hydroxide/magnesium hydroxide/simethicone Suspension 30 milliLiter(s) Oral every 4 hours PRN  pantoprazole    Tablet 40 milliGRAM(s) Oral before breakfast  simethicone 80 milliGRAM(s) Chew every 12 hours      dextrose 50% Injectable 25 Gram(s) IV Push once  dextrose 50% Injectable 12.5 Gram(s) IV Push once  dextrose 50% Injectable 25 Gram(s) IV Push once  dextrose Oral Gel 15 Gram(s) Oral once PRN  glucagon  Injectable 1 milliGRAM(s) IntraMuscular once  insulin lispro (ADMELOG) corrective regimen sliding scale   SubCutaneous every 6 hours    dextrose 5%. 1000 milliLiter(s) IV Continuous <Continuous>  dextrose 5%. 1000 milliLiter(s) IV Continuous <Continuous>      cadexomer iodine 0.9% Gel 1 Application(s) Topical <User Schedule>  chlorhexidine 0.12% Liquid 15 milliLiter(s) Oral Mucosa every 12 hours  chlorhexidine 2% Cloths 1 Application(s) Topical two times a day  povidone iodine 10% Solution 1 Application(s) Topical daily        Mode: prvc  RR (machine): 18  TV (machine): 400  FiO2: 50  PEEP: 5  ITime: 1  MAP: 17  PIP: 30      ICU Vital Signs Last 24 Hrs  T(C): 37.4 (26 Oct 2022 20:00), Max: 37.6 (25 Oct 2022 21:10)  T(F): 99.4 (26 Oct 2022 20:00), Max: 99.6 (25 Oct 2022 21:10)  HR: 76 (26 Oct 2022 20:00) (63 - 90)  BP: 118/66 (26 Oct 2022 20:00) (109/60 - 137/81)  BP(mean): 83 (26 Oct 2022 20:00) (74 - 99)  ABP: --  ABP(mean): --  RR: 25 (26 Oct 2022 20:00) (18 - 31)  SpO2: 99% (26 Oct 2022 20:00) (86% - 100%)    O2 Parameters below as of 26 Oct 2022 20:00  Patient On (Oxygen Delivery Method): ventilator    O2 Concentration (%): 50      Vital Signs Last 24 Hrs  T(C): 37.4 (26 Oct 2022 20:00), Max: 37.6 (25 Oct 2022 21:10)  T(F): 99.4 (26 Oct 2022 20:00), Max: 99.6 (25 Oct 2022 21:10)  HR: 76 (26 Oct 2022 20:00) (63 - 90)  BP: 118/66 (26 Oct 2022 20:00) (109/60 - 137/81)  BP(mean): 83 (26 Oct 2022 20:00) (74 - 99)  RR: 25 (26 Oct 2022 20:00) (18 - 31)  SpO2: 99% (26 Oct 2022 20:00) (86% - 100%)    Parameters below as of 26 Oct 2022 20:00  Patient On (Oxygen Delivery Method): ventilator    O2 Concentration (%): 50        I&O's Detail    25 Oct 2022 07:01  -  26 Oct 2022 07:00  --------------------------------------------------------  IN:    Free Water: 565 mL    IV PiggyBack: 50 mL    Nepro with Carb Steady: 560 mL  Total IN: 1175 mL    OUT:    Indwelling Catheter - Urethral (mL): 700 mL  Total OUT: 700 mL    Total NET: 475 mL      26 Oct 2022 07:01  -  26 Oct 2022 20:14  --------------------------------------------------------  IN:    Free Water: 455 mL    IV PiggyBack: 50 mL    Nepro with Carb Steady: 520 mL  Total IN: 1025 mL    OUT:    Indwelling Catheter - Urethral (mL): 300 mL  Total OUT: 300 mL    Total NET: 725 mL            LABS:                        7.7    11.65 )-----------( 162      ( 26 Oct 2022 06:23 )             26.8     10-26    143  |  112<H>  |  32<H>  ----------------------------<  206<H>  3.9   |  26  |  0.94    Ca    8.3<L>      26 Oct 2022 06:23  Phos  4.0     10-25  Mg     2.1     10-25    TPro  5.7<L>  /  Alb  1.5<L>  /  TBili  0.2  /  DBili  x   /  AST  12  /  ALT  18  /  AlkPhos  96  10-26          CAPILLARY BLOOD GLUCOSE      POCT Blood Glucose.: 224 mg/dL (26 Oct 2022 17:06)        CULTURES:    cefTRIAXone   IVPB          Physical Examination:  General: Vented /Sedated Alert, oriented, interactive, nonfocal  NEURO: Vented. Cn2-12 intact.   HEENT: Pupils equal, reactive to light.  Symmetric.  PULM: Dimished b/l. no significant sputum production, no wheezes, rales, rhonchi  CVS: Regular rate and rhythm, no murmurs, rubs, or gallops  ABD: Soft, nondistended, nontender, normoactive bowel sounds, no masses  EXT: Mild + 1  edema. , nontender  SKIN: Warm and well perfused, no rashes noted      RADIOLOGY:   < from: CT Abdomen and Pelvis No Cont (10.20.22 @ 22:43) >  IMPRESSION:    No retroperitoneal hemorrhage.    Stable moderate bilateral pleural effusions with underlying compressive   atelectasis.    Small ascites.    Mildly distended rectum with liquid stool.        A preliminary report was provided by Dr. José Magaña. I agree with   the interpretation.    --- End of Report ---    < end of copied text >    < from: CT Chest No Cont (10.18.22 @ 13:13) >  IMPRESSION: Groundglass opacities and interlobular septal thickening   suggestive of CHF. There are also some more confluent areas of density   which may represent atelectasis versus infiltrates.  Bilateral pleural effusions have decreased from the prior exam  Tracheostomy and gastrostomy    --- End of Report ---    < end of copied text >          ASSESSMENT     75 y/o F with PMHx of dementia, COPD, VDRF s/p trach peg, cardiac arrest, CVA, CKD, prior covid, nonverbal at baseline admitted on 10/18 for acute on chronic hypoxic respiratory failure due to pna with hospital course complicated by chronic anemia, RYAN, hyperkalemia, hyperglycemia, hyponatremia s/p 2 prbc. CT with bilateral moderate pleural effusions. Repeat CT showed stable/ slightly smaller pleural effusions, no indication for thoracentesis at this time. CT abd/pevlis negative for active bleed, RP bleed, BUN normal, low suspicion for active bleed, Hb down to 7.7 will repeat in AM and tranfuse for goal Hb > 7.0   Weaned to 50 % Fio2,  HD stable on midodrine 10 mg q 8. . POCUS of lung fields at bedside showed diffuse B lines in all lung fields, possible CHF component vs PNA, Cardiac pocus limited to poor windows + pt rigidity. Will add 20 of lasix to help with diuresis, strict I/o's. Continue ceftriaxone for CAUTI, WBC downtrending to 11. Pt is critically ill with high risk for acute deterioration and is in ICU for treatment of   1. Acute on Chronic O2 RF  2. PNA  3. Septic Shock --- Resolved  4. Cannot Exclude CHF exac component  5. UTI  6. Acute on chornic anemia.    Plan  Neuro; PRN ativan for sedation/ vent compliance.   Pulm: 400/ 18/ 50%/5, actively titrating down FIo2. POCUS of lungs showed diffuse B lines in all fields, possible CHF component, will trial 20 of lasix to try and facilitate further oxygenation wean. Repeat CT showed stable pleural effusions, no indication for thoracentesis at this time. Asp precautions   Cardio: HD stable at this time. Continue midodrine 10 mg Q 8, will increase if needed to maintain MAP > 65. Cardiology note appreciated  GI: NPO with tube feeds. PEG dysfunction, adjusted by GI team on 10/25, possible gastroparesis will monitor. GI consult appreciated   Renal: Scr downtrending, stable at 0.94. Trend Scr and lytes and replete as needed. Trial lasix 20 for possible CHF component Woody with strict I/O;s.   Heme; Hep SubQ for DVT prophylaxis. Hb down trending from 8.6 to 7.7, no imaging suggestive of active bleed, non-tachycardic, BUN normal, no reports of bloody BM. Will trend HB, transfuse for goal Hb > 7.0.   ID: S/p Levofloxacin in ED. Ceftriaxone for CAUTI with urine culture growing ECOLI. BC NGTD. Will switch to Cefpodoxime on discharge. WBC downtrending to 11, afebrile. ID consult appreciated.  Endo: ISS. Goal -180  Dispo: Critically ill patient with acute hypoxic RF actively trying to wean down oxygenation.       Critical Care time: 35 mins assessing presenting problems of acute illness that poses high probability of life threatening deterioration or end organ damage/dysfunction.  Medical decision making including Initiating plan of care, reviewing data, reviewing radiology, direct patient bedside evaluation and interpretation of vital signs, any necessary ventilator management , discussion with multidisciplinary team, discussing goals of care with patient/family, all non inclusive of procedures

## 2022-10-27 LAB
ALBUMIN SERPL ELPH-MCNC: 1.5 G/DL — LOW (ref 3.3–5)
ALP SERPL-CCNC: 108 U/L — SIGNIFICANT CHANGE UP (ref 30–120)
ALT FLD-CCNC: 15 U/L DA — SIGNIFICANT CHANGE UP (ref 10–60)
ANION GAP SERPL CALC-SCNC: 8 MMOL/L — SIGNIFICANT CHANGE UP (ref 5–17)
AST SERPL-CCNC: 12 U/L — SIGNIFICANT CHANGE UP (ref 10–40)
BILIRUB SERPL-MCNC: 0.3 MG/DL — SIGNIFICANT CHANGE UP (ref 0.2–1.2)
BUN SERPL-MCNC: 30 MG/DL — HIGH (ref 7–23)
CALCIUM SERPL-MCNC: 8.6 MG/DL — SIGNIFICANT CHANGE UP (ref 8.4–10.5)
CHLORIDE SERPL-SCNC: 107 MMOL/L — SIGNIFICANT CHANGE UP (ref 96–108)
CO2 SERPL-SCNC: 26 MMOL/L — SIGNIFICANT CHANGE UP (ref 22–31)
CREAT SERPL-MCNC: 0.97 MG/DL — SIGNIFICANT CHANGE UP (ref 0.5–1.3)
EGFR: 60 ML/MIN/1.73M2 — SIGNIFICANT CHANGE UP
GLUCOSE BLDC GLUCOMTR-MCNC: 158 MG/DL — HIGH (ref 70–99)
GLUCOSE BLDC GLUCOMTR-MCNC: 213 MG/DL — HIGH (ref 70–99)
GLUCOSE BLDC GLUCOMTR-MCNC: 215 MG/DL — HIGH (ref 70–99)
GLUCOSE BLDC GLUCOMTR-MCNC: 236 MG/DL — HIGH (ref 70–99)
GLUCOSE SERPL-MCNC: 165 MG/DL — HIGH (ref 70–99)
HCT VFR BLD CALC: 27.4 % — LOW (ref 34.5–45)
HGB BLD-MCNC: 8 G/DL — LOW (ref 11.5–15.5)
MCHC RBC-ENTMCNC: 27 PG — SIGNIFICANT CHANGE UP (ref 27–34)
MCHC RBC-ENTMCNC: 29.2 GM/DL — LOW (ref 32–36)
MCV RBC AUTO: 92.6 FL — SIGNIFICANT CHANGE UP (ref 80–100)
NRBC # BLD: 0 /100 WBCS — SIGNIFICANT CHANGE UP (ref 0–0)
PLATELET # BLD AUTO: 178 K/UL — SIGNIFICANT CHANGE UP (ref 150–400)
POTASSIUM SERPL-MCNC: 3.4 MMOL/L — LOW (ref 3.5–5.3)
POTASSIUM SERPL-SCNC: 3.4 MMOL/L — LOW (ref 3.5–5.3)
PROCALCITONIN SERPL-MCNC: 0.47 NG/ML — HIGH (ref 0.02–0.1)
PROT SERPL-MCNC: 6.8 G/DL — SIGNIFICANT CHANGE UP (ref 6–8.3)
RBC # BLD: 2.96 M/UL — LOW (ref 3.8–5.2)
RBC # FLD: 19 % — HIGH (ref 10.3–14.5)
SODIUM SERPL-SCNC: 141 MMOL/L — SIGNIFICANT CHANGE UP (ref 135–145)
WBC # BLD: 10.05 K/UL — SIGNIFICANT CHANGE UP (ref 3.8–10.5)
WBC # FLD AUTO: 10.05 K/UL — SIGNIFICANT CHANGE UP (ref 3.8–10.5)

## 2022-10-27 PROCEDURE — 99233 SBSQ HOSP IP/OBS HIGH 50: CPT

## 2022-10-27 RX ORDER — SODIUM CHLORIDE 9 MG/ML
1000 INJECTION, SOLUTION INTRAVENOUS
Refills: 0 | Status: DISCONTINUED | OUTPATIENT
Start: 2022-10-27 | End: 2022-11-02

## 2022-10-27 RX ORDER — GLUCAGON INJECTION, SOLUTION 0.5 MG/.1ML
1 INJECTION, SOLUTION SUBCUTANEOUS ONCE
Refills: 0 | Status: DISCONTINUED | OUTPATIENT
Start: 2022-10-27 | End: 2022-11-02

## 2022-10-27 RX ORDER — DEXTROSE 50 % IN WATER 50 %
25 SYRINGE (ML) INTRAVENOUS ONCE
Refills: 0 | Status: DISCONTINUED | OUTPATIENT
Start: 2022-10-27 | End: 2022-11-02

## 2022-10-27 RX ORDER — INSULIN LISPRO 100/ML
VIAL (ML) SUBCUTANEOUS EVERY 6 HOURS
Refills: 0 | Status: DISCONTINUED | OUTPATIENT
Start: 2022-10-27 | End: 2022-11-02

## 2022-10-27 RX ORDER — DEXTROSE 50 % IN WATER 50 %
15 SYRINGE (ML) INTRAVENOUS ONCE
Refills: 0 | Status: DISCONTINUED | OUTPATIENT
Start: 2022-10-27 | End: 2022-11-02

## 2022-10-27 RX ORDER — POTASSIUM CHLORIDE 20 MEQ
40 PACKET (EA) ORAL ONCE
Refills: 0 | Status: COMPLETED | OUTPATIENT
Start: 2022-10-27 | End: 2022-10-27

## 2022-10-27 RX ORDER — INSULIN GLARGINE 100 [IU]/ML
5 INJECTION, SOLUTION SUBCUTANEOUS EVERY MORNING
Refills: 0 | Status: DISCONTINUED | OUTPATIENT
Start: 2022-10-28 | End: 2022-11-02

## 2022-10-27 RX ORDER — DEXTROSE 50 % IN WATER 50 %
12.5 SYRINGE (ML) INTRAVENOUS ONCE
Refills: 0 | Status: DISCONTINUED | OUTPATIENT
Start: 2022-10-27 | End: 2022-11-02

## 2022-10-27 RX ADMIN — CHLORHEXIDINE GLUCONATE 1 APPLICATION(S): 213 SOLUTION TOPICAL at 17:47

## 2022-10-27 RX ADMIN — CHLORHEXIDINE GLUCONATE 1 APPLICATION(S): 213 SOLUTION TOPICAL at 05:02

## 2022-10-27 RX ADMIN — MIDODRINE HYDROCHLORIDE 10 MILLIGRAM(S): 2.5 TABLET ORAL at 05:13

## 2022-10-27 RX ADMIN — Medication 1 MILLIGRAM(S): at 23:21

## 2022-10-27 RX ADMIN — ALBUTEROL 2 PUFF(S): 90 AEROSOL, METERED ORAL at 06:34

## 2022-10-27 RX ADMIN — Medication 1 MILLIGRAM(S): at 00:09

## 2022-10-27 RX ADMIN — SIMETHICONE 80 MILLIGRAM(S): 80 TABLET, CHEWABLE ORAL at 17:48

## 2022-10-27 RX ADMIN — CEFTRIAXONE 100 MILLIGRAM(S): 500 INJECTION, POWDER, FOR SOLUTION INTRAMUSCULAR; INTRAVENOUS at 15:18

## 2022-10-27 RX ADMIN — Medication 4: at 17:48

## 2022-10-27 RX ADMIN — CHLORHEXIDINE GLUCONATE 15 MILLILITER(S): 213 SOLUTION TOPICAL at 05:01

## 2022-10-27 RX ADMIN — MIDODRINE HYDROCHLORIDE 10 MILLIGRAM(S): 2.5 TABLET ORAL at 21:49

## 2022-10-27 RX ADMIN — SIMETHICONE 80 MILLIGRAM(S): 80 TABLET, CHEWABLE ORAL at 05:02

## 2022-10-27 RX ADMIN — Medication 1 MILLIGRAM(S): at 05:01

## 2022-10-27 RX ADMIN — Medication 1 MILLIGRAM(S): at 17:47

## 2022-10-27 RX ADMIN — Medication 1 MILLIGRAM(S): at 20:37

## 2022-10-27 RX ADMIN — Medication 4: at 11:33

## 2022-10-27 RX ADMIN — ALBUTEROL 2 PUFF(S): 90 AEROSOL, METERED ORAL at 20:47

## 2022-10-27 RX ADMIN — Medication 1 MILLIGRAM(S): at 11:33

## 2022-10-27 RX ADMIN — Medication 1 PUFF(S): at 00:37

## 2022-10-27 RX ADMIN — Medication 3 MILLIGRAM(S): at 21:49

## 2022-10-27 RX ADMIN — HEPARIN SODIUM 5000 UNIT(S): 5000 INJECTION INTRAVENOUS; SUBCUTANEOUS at 17:48

## 2022-10-27 RX ADMIN — Medication 2: at 00:09

## 2022-10-27 RX ADMIN — PANTOPRAZOLE SODIUM 40 MILLIGRAM(S): 20 TABLET, DELAYED RELEASE ORAL at 06:09

## 2022-10-27 RX ADMIN — CHLORHEXIDINE GLUCONATE 15 MILLILITER(S): 213 SOLUTION TOPICAL at 17:48

## 2022-10-27 RX ADMIN — Medication 2: at 06:09

## 2022-10-27 RX ADMIN — Medication 1 PUFF(S): at 20:48

## 2022-10-27 RX ADMIN — Medication 4: at 23:52

## 2022-10-27 RX ADMIN — MIDODRINE HYDROCHLORIDE 10 MILLIGRAM(S): 2.5 TABLET ORAL at 15:18

## 2022-10-27 RX ADMIN — Medication 40 MILLIEQUIVALENT(S): at 09:42

## 2022-10-27 RX ADMIN — Medication 1 PUFF(S): at 06:34

## 2022-10-27 RX ADMIN — Medication 1 APPLICATION(S): at 11:33

## 2022-10-27 RX ADMIN — HEPARIN SODIUM 5000 UNIT(S): 5000 INJECTION INTRAVENOUS; SUBCUTANEOUS at 05:01

## 2022-10-27 RX ADMIN — ALBUTEROL 2 PUFF(S): 90 AEROSOL, METERED ORAL at 00:37

## 2022-10-27 NOTE — PROGRESS NOTE ADULT - ASSESSMENT
75yo F with PMH of dementia, COPD with chronic resp failure and is vent dependent, s/p PEG, hx of cardiac arrest, diastolic CHF, cor pulmonale, hx of CVA, COVID-19 infxn, CKD, anemia, multiple admissions for respiratory failure (recent admissions in 06/2022 and 08/2022 diagnosed with PNA with parapneumonic pleural effusion s/p thoracentesis and Avycaz) who presents from [facility] for respiratory distress again a/w sepsis and acute on chronic hypoxic respiratory failure due to suspected recurrent VAP.    Severe sepsis and acute hypoxic respiratory failure 2/2 gram-negative PNA   - titrating down FiO2 on vent as able - has been on 50-60% with difficulty going down lower without desaturation   - had 7-day course of levaquin per ID for PNA (Stenotrophomonas on culture) ; and will c/w just rocephin now for potential CAUTI (10-day course to be completed with dose on 10/30)  - ID ([facility]), recs appreciated   - procalcitonin high but improving 23 ->6.6 within a couple of days of starting Abx and now down to 0.47 on 10/27/22  - cautious use of fluids given hx of CHF and severe hypoalbuminemia (received 1750cc of NS in ED)  - blood cultures (NGTD), urine culture growing E coli sensitive to rocephin  - trach sputum culture - grew Stenotrophomonas sensitive to levaquin  - Checked CT Abd/P to eval for any other potential source sign of infection and found no significant sign of intra-abdominal infection on CT  - c/w midodrine with hold parameters -- consider titrating dose if BP elevated  - cc/pulm consult (Jaime), recs appreciated  - palliative care (Emre), recs appreciated - pt is full code  - leukocytosis resolving    Diastolic CHF  Hx of Pleural effusions  - last TTE was 5/30 with EF 65% with normal LVSF, dilated RV, and moderate pHTN -> repeat TTE appears unchanged  - pt also with severe hypoalbuminemia   - may need repeat thoracentesis (had 1.5L drained few admissions ago), pulmonology consulted; pl effusion was parapneumonic on past admission as opposed to transudative and thus less likely related to CHF    RYAN on CKD 3  - likely in part ATN due to sepsis and in part prerenal   - renal function recovered to baseline CKD 3  - renally dose medications and monitor BMP and electrolytes   - given pt has low muscle mass from being bedbound for years, this GFR estimate is likely falsely high  - monitor BMP  - mild hypokalemia today repleted with KCl    Anemia of chronic disease  - has been evaluated by GI and hematology in the past -> dx with anemia of chronic disease with intermittent GI bleeding  - s/p 2 unit PRBC   - current Hgb stable ~8; monitor for possible need for another PRBC  - no jacqueline GI bleed per nursing     hx of HTN  - not on any anti-hypertensive meds at facility  - monitor VS    DM2  - NPO with TF  - c/w moderate dose lispro coverage sliding scale q6h  - goal -180 - mildly above goal - will starting lantus 5un sq QAM tomorrow morning     Diet  - TF was on hold 2/2 leak around PEG site, GI readjusted PEG and not leaking currently  - GI (Leia/ group), recs appreciated     Preventive measures  - cont with heparin subq for DVT ppx  - Full Code

## 2022-10-27 NOTE — PROGRESS NOTE ADULT - SUBJECTIVE AND OBJECTIVE BOX
Date/Time Patient Seen:  		  Referring MD:   Data Reviewed	       Patient is a 76y old  Female who presents with a chief complaint of Acute on Hypoxemic respiratory failure (26 Oct 2022 20:14)      Subjective/HPI     PAST MEDICAL & SURGICAL HISTORY:  Dementia of frontal lobe type    Aphasic stroke    Diabetes mellitus    Respiratory failure    Hypertension    GERD (gastroesophageal reflux disease)    Constipation    Respiratory failure    CVA (cerebral vascular accident)    HTN (hypertension)    DM (diabetes mellitus)    Advanced dementia    COVID-19 virus detected    Quadriplegia    Pneumonia    Type II diabetes mellitus    Hx of appendectomy    Gastrostomy in place    Tracheostomy in place    Tracheostomy tube present    Feeding by G-tube          Medication list         MEDICATIONS  (STANDING):  ALBUTerol    90 MICROgram(s) HFA Inhaler 2 Puff(s) Inhalation every 6 hours  cadexomer iodine 0.9% Gel 1 Application(s) Topical <User Schedule>  cefTRIAXone   IVPB      cefTRIAXone   IVPB 1000 milliGRAM(s) IV Intermittent every 24 hours  chlorhexidine 0.12% Liquid 15 milliLiter(s) Oral Mucosa every 12 hours  chlorhexidine 2% Cloths 1 Application(s) Topical two times a day  dextrose 5%. 1000 milliLiter(s) (50 mL/Hr) IV Continuous <Continuous>  dextrose 5%. 1000 milliLiter(s) (100 mL/Hr) IV Continuous <Continuous>  dextrose 50% Injectable 25 Gram(s) IV Push once  dextrose 50% Injectable 12.5 Gram(s) IV Push once  dextrose 50% Injectable 25 Gram(s) IV Push once  glucagon  Injectable 1 milliGRAM(s) IntraMuscular once  heparin   Injectable 5000 Unit(s) SubCutaneous every 12 hours  insulin lispro (ADMELOG) corrective regimen sliding scale   SubCutaneous every 6 hours  ipratropium 17 MICROgram(s) HFA Inhaler 1 Puff(s) Inhalation every 6 hours  LORazepam     Tablet 1 milliGRAM(s) Oral every 6 hours  midodrine 10 milliGRAM(s) Oral every 8 hours  pantoprazole    Tablet 40 milliGRAM(s) Oral before breakfast  povidone iodine 10% Solution 1 Application(s) Topical daily  simethicone 80 milliGRAM(s) Chew every 12 hours    MEDICATIONS  (PRN):  acetaminophen     Tablet .. 650 milliGRAM(s) Oral every 6 hours PRN Temp greater or equal to 38C (100.4F), Mild Pain (1 - 3)  aluminum hydroxide/magnesium hydroxide/simethicone Suspension 30 milliLiter(s) Oral every 4 hours PRN Dyspepsia  dextrose Oral Gel 15 Gram(s) Oral once PRN Blood Glucose LESS THAN 70 milliGRAM(s)/deciliter  LORazepam   Injectable 1 milliGRAM(s) IV Push every 4 hours PRN Agitation  melatonin 3 milliGRAM(s) Oral at bedtime PRN Insomnia  ondansetron Injectable 4 milliGRAM(s) IV Push every 8 hours PRN Nausea and/or Vomiting         Vitals log        ICU Vital Signs Last 24 Hrs  T(C): 37.1 (27 Oct 2022 04:00), Max: 37.4 (26 Oct 2022 20:00)  T(F): 98.8 (27 Oct 2022 04:00), Max: 99.4 (26 Oct 2022 20:00)  HR: 69 (27 Oct 2022 06:00) (64 - 90)  BP: 115/64 (27 Oct 2022 06:00) (110/63 - 137/81)  BP(mean): 79 (27 Oct 2022 06:00) (76 - 99)  ABP: --  ABP(mean): --  RR: 20 (27 Oct 2022 06:00) (19 - 30)  SpO2: 96% (27 Oct 2022 06:00) (95% - 100%)    O2 Parameters below as of 27 Oct 2022 06:00  Patient On (Oxygen Delivery Method): ventilator    O2 Concentration (%): 40         Mode: AC/ CMV (Assist Control/ Continuous Mandatory Ventilation)  RR (machine): 18  TV (machine): 400  FiO2: 50  PEEP: 5  ITime: 1  MAP: 15  PIP: 27      Input and Output:  I&O's Detail    25 Oct 2022 07:01  -  26 Oct 2022 07:00  --------------------------------------------------------  IN:    Free Water: 565 mL    IV PiggyBack: 50 mL    Nepro with Carb Steady: 560 mL  Total IN: 1175 mL    OUT:    Indwelling Catheter - Urethral (mL): 700 mL  Total OUT: 700 mL    Total NET: 475 mL      26 Oct 2022 07:01  -  27 Oct 2022 06:32  --------------------------------------------------------  IN:    Free Water: 735 mL    IV PiggyBack: 50 mL    Nepro with Carb Steady: 840 mL  Total IN: 1625 mL    OUT:    Indwelling Catheter - Urethral (mL): 1200 mL  Total OUT: 1200 mL    Total NET: 425 mL          Lab Data                        7.7    11.65 )-----------( 162      ( 26 Oct 2022 06:23 )             26.8     10-26    143  |  112<H>  |  32<H>  ----------------------------<  206<H>  3.9   |  26  |  0.94    Ca    8.3<L>      26 Oct 2022 06:23  Phos  4.0     10-25  Mg     2.1     10-25    TPro  5.7<L>  /  Alb  1.5<L>  /  TBili  0.2  /  DBili  x   /  AST  12  /  ALT  18  /  AlkPhos  96  10-26            Review of Systems	      Objective     Physical Examination  heart s1s2  lung dec BS  head nc        Pertinent Lab findings & Imaging      Annalise:  NO   Adequate UO     I&O's Detail    25 Oct 2022 07:01  -  26 Oct 2022 07:00  --------------------------------------------------------  IN:    Free Water: 565 mL    IV PiggyBack: 50 mL    Nepro with Carb Steady: 560 mL  Total IN: 1175 mL    OUT:    Indwelling Catheter - Urethral (mL): 700 mL  Total OUT: 700 mL    Total NET: 475 mL      26 Oct 2022 07:01  -  27 Oct 2022 06:32  --------------------------------------------------------  IN:    Free Water: 735 mL    IV PiggyBack: 50 mL    Nepro with Carb Steady: 840 mL  Total IN: 1625 mL    OUT:    Indwelling Catheter - Urethral (mL): 1200 mL  Total OUT: 1200 mL    Total NET: 425 mL               Discussed with:     Cultures:	        Radiology

## 2022-10-27 NOTE — PROGRESS NOTE ADULT - SUBJECTIVE AND OBJECTIVE BOX
[facility], Division of Infectious Diseases  MOIZ Lora Y. Patel, S. Shah, G. [facility]  [phone]    Name: BREA BECKHAM  Age: 76y  Gender: Female  MRN: [id number]    Interval History:  Patient seen and examined at bedside   No acute overnight events. Afebrile  Notes reviewed    Antibiotics:  cefTRIAXone   IVPB 1000 milliGRAM(s) IV Intermittent every 24 hours  cefTRIAXone   IVPB        Medications:  acetaminophen     Tablet .. 650 milliGRAM(s) Oral every 6 hours PRN  ALBUTerol    90 MICROgram(s) HFA Inhaler 2 Puff(s) Inhalation every 6 hours  aluminum hydroxide/magnesium hydroxide/simethicone Suspension 30 milliLiter(s) Oral every 4 hours PRN  cadexomer iodine 0.9% Gel 1 Application(s) Topical <User Schedule>  cefTRIAXone   IVPB 1000 milliGRAM(s) IV Intermittent every 24 hours  cefTRIAXone   IVPB      chlorhexidine 0.12% Liquid 15 milliLiter(s) Oral Mucosa every 12 hours  chlorhexidine 2% Cloths 1 Application(s) Topical two times a day  dextrose 5%. 1000 milliLiter(s) IV Continuous <Continuous>  dextrose 5%. 1000 milliLiter(s) IV Continuous <Continuous>  dextrose 50% Injectable 25 Gram(s) IV Push once  dextrose 50% Injectable 12.5 Gram(s) IV Push once  dextrose 50% Injectable 25 Gram(s) IV Push once  dextrose Oral Gel 15 Gram(s) Oral once PRN  fentaNYL    Injectable 25 MICROGram(s) IV Push every 2 hours PRN  glucagon  Injectable 1 milliGRAM(s) IntraMuscular once  heparin   Injectable 5000 Unit(s) SubCutaneous every 12 hours  insulin lispro (ADMELOG) corrective regimen sliding scale   SubCutaneous every 6 hours  ipratropium 17 MICROgram(s) HFA Inhaler 1 Puff(s) Inhalation every 6 hours  levoFLOXacin IVPB 750 milliGRAM(s) IV Intermittent every 48 hours  LORazepam     Tablet 1 milliGRAM(s) Oral every 6 hours  LORazepam   Injectable 1 milliGRAM(s) IV Push every 4 hours PRN  melatonin 3 milliGRAM(s) Oral at bedtime PRN  midodrine 10 milliGRAM(s) Oral every 8 hours  ondansetron Injectable 4 milliGRAM(s) IV Push every 8 hours PRN  pantoprazole    Tablet 40 milliGRAM(s) Oral before breakfast  povidone iodine 10% Solution 1 Application(s) Topical daily  simethicone 80 milliGRAM(s) Chew every 12 hours      Review of Systems:  unable to obtain    Allergies: codeine (Hives)    For details regarding the patient's past medical history, social history, family history, and other miscellaneous elements, please refer the initial infectious diseases consultation and/or the admitting history and physical examination for this admission.    Objective:  Vital Signs Last 24 Hrs  T(C): 37.7 (27 Oct 2022 12:44), Max: 37.7 (27 Oct 2022 12:44)  T(F): 99.8 (27 Oct 2022 12:44), Max: 99.8 (27 Oct 2022 12:44)  HR: 67 (27 Oct 2022 10:58) (61 - 82)  BP: 124/61 (27 Oct 2022 10:00) (110/63 - 124/61)  BP(mean): 81 (27 Oct 2022 10:00) (76 - 89)  RR: 16 (27 Oct 2022 10:00) (16 - 33)  SpO2: 99% (27 Oct 2022 10:58) (95% - 100%)    Parameters below as of 27 Oct 2022 10:00  Patient On (Oxygen Delivery Method): ventilator    O2 Concentration (%): 50    Physical Examination:  General: NAD  HEENT: NC/AT, EOMI,   Neck: trach to vent  Lungs: MV breath sounds  Heart: Regular rate and rhythm. No murmur, rub or gallop.  Abdomen: Soft. Nondistended. Nontender.  Skin: Warm. Dry.      Laboratory Studies:                        8.0    10.05 )-----------( 178      ( 27 Oct 2022 06:00 )             27.4   10-27    141  |  107  |  30<H>  ----------------------------<  165<H>  3.4<L>   |  26  |  0.97    Ca    8.6      27 Oct 2022 06:00    TPro  6.8  /  Alb  1.5<L>  /  TBili  0.3  /  DBili  x   /  AST  12  /  ALT  15  /  AlkPhos  108  10-27        Microbiology: reviewed    Culture - Sputum (collected 10-19-22 @ 00:58)  Source: .Sputum Sputum trap  Gram Stain (10-19-22 @ 19:24):    Few Squamous epithelial cells per low power field    Few polymorphonuclear leukocytes per low power field    Few Gram positive cocci in pairs per oil power field  Final Report (10-22-22 @ 17:55):    Moderate Mixed gram negative rods including    Moderate Stenotrophomonas maltophilia  Organism: Stenotrophomonas maltophilia (10-22-22 @ 17:56)      -  Minocycline: S [id number]      Method Type: KB  Organism: Stenotrophomonas maltophilia (10-22-22 @ 17:56)      -  Levofloxacin: S 2      -  Trimethoprim/Sulfamethoxazole: S <=0.5/9.5      Method Type: PRATIK  Organism: Stenotrophomonas maltophilia (10-22-22 @ 17:55)    Culture - Urine (collected 10-18-22 @ 12:27)  Source: Clean Catch Clean Catch (Midstream)  Final Report (10-21-22 @ 08:18):    >100,000 CFU/ml Escherichia coli  Organism: Escherichia coli (10-21-22 @ 08:18)  Organism: Escherichia coli (10-21-22 @ 08:18)      -  Amikacin: S <=16      -  Amoxicillin/Clavulanic Acid: S <=8/4      -  Ampicillin: R >16 These ampicillin results predict results for amoxicillin      -  Ampicillin/Sulbactam: I 16/8 Enterobacter, Klebsiella aerogenes, Citrobacter, and Serratia may develop resistance during prolonged therapy (3-4 days)      -  Aztreonam: S <=4      -  Cefazolin: S <=2 For uncomplicated UTI with K. pneumoniae, E. coli, or P. mirablis: PRATIK <=16 is sensitive and PRATIK >=32 is resistant. This also predicts results for oral agents cefaclor, cefdinir, cefpodoxime, cefprozil, cefuroxime axetil, cephalexin and locarbef for uncomplicated UTI. Note that some isolates may be susceptible to these agents while testing resistant to cefazolin.      -  Cefepime: S <=2      -  Cefoxitin: S <=8      -  Ceftriaxone: S <=1 Enterobacter, Klebsiella aerogenes, Citrobacter, and Serratia may develop resistance during prolonged therapy      -  Ciprofloxacin: R >2      -  Ertapenem: I 1      -  Gentamicin: R >8      -  Imipenem: S <=1      -  Levofloxacin: R >4      -  Meropenem: S <=1      -  Nitrofurantoin: S <=32 Should not be used to treat pyelonephritis      -  Piperacillin/Tazobactam: S <=8      -  Tigecycline: S <=2      -  Tobramycin: I 8      -  Trimethoprim/Sulfamethoxazole: R >2/38      Method Type: PRATIK    Culture - Blood (collected 10-18-22 @ 12:00)  Source: .Blood Blood-Peripheral  Final Report (10-23-22 @ 17:00):    No Growth Final    Culture - Blood (collected 10-18-22 @ 12:00)  Source: .Blood Blood-Peripheral  Final Report (10-23-22 @ 17:00):    No Growth Final          Radiology: reviewed

## 2022-10-27 NOTE — PROGRESS NOTE ADULT - SUBJECTIVE AND OBJECTIVE BOX
Chief Complaint: Hypoxia    Interval Events: No events overnight.    Review of Systems:  Unable to obtain    Physical Exam:  Vital Signs Last 24 Hrs  T(C): 37.4 (27 Oct 2022 08:19), Max: 37.4 (26 Oct 2022 20:00)  T(F): 99.4 (27 Oct 2022 08:19), Max: 99.4 (26 Oct 2022 20:00)  HR: 61 (27 Oct 2022 09:00) (61 - 90)  BP: 121/63 (27 Oct 2022 08:00) (110/63 - 137/81)  BP(mean): 81 (27 Oct 2022 08:00) (76 - 99)  RR: 18 (27 Oct 2022 09:00) (18 - 33)  SpO2: 100% (27 Oct 2022 09:00) (95% - 100%)  Parameters below as of 27 Oct 2022 09:00  Patient On (Oxygen Delivery Method): ventilator  O2 Concentration (%): 60  General: Unresponsive  HEENT: Trach  Neck: No JVD, no carotid bruit  Lungs: CTAB  CV: RRR, nl S1/S2, no M/R/G  Abdomen: S/NT/ND, +BS  Extremities: No LE edema, no cyanosis  Neuro: AAOx0  Skin: No rash    Labs:    10-27    141  |  107  |  30<H>  ----------------------------<  165<H>  3.4<L>   |  26  |  0.97    Ca    8.6      27 Oct 2022 06:00    TPro  6.8  /  Alb  1.5<L>  /  TBili  0.3  /  DBili  x   /  AST  12  /  ALT  15  /  AlkPhos  108  10-27                        8.0    10.05 )-----------( 178      ( 27 Oct 2022 06:00 )             27.4         Telemetry: Sinus rhythm

## 2022-10-27 NOTE — PROGRESS NOTE ADULT - SUBJECTIVE AND OBJECTIVE BOX
INTERVAL HPI/OVERNIGHT EVENTS:  tolerating feeds  MEDICATIONS  (STANDING):  ALBUTerol    90 MICROgram(s) HFA Inhaler 2 Puff(s) Inhalation every 6 hours  cadexomer iodine 0.9% Gel 1 Application(s) Topical <User Schedule>  cefTRIAXone   IVPB      cefTRIAXone   IVPB 1000 milliGRAM(s) IV Intermittent every 24 hours  chlorhexidine 0.12% Liquid 15 milliLiter(s) Oral Mucosa every 12 hours  chlorhexidine 2% Cloths 1 Application(s) Topical two times a day  dextrose 5%. 1000 milliLiter(s) (50 mL/Hr) IV Continuous <Continuous>  dextrose 5%. 1000 milliLiter(s) (100 mL/Hr) IV Continuous <Continuous>  dextrose 50% Injectable 25 Gram(s) IV Push once  dextrose 50% Injectable 12.5 Gram(s) IV Push once  dextrose 50% Injectable 25 Gram(s) IV Push once  glucagon  Injectable 1 milliGRAM(s) IntraMuscular once  heparin   Injectable 5000 Unit(s) SubCutaneous every 12 hours  insulin lispro (ADMELOG) corrective regimen sliding scale   SubCutaneous every 6 hours  ipratropium 17 MICROgram(s) HFA Inhaler 1 Puff(s) Inhalation every 6 hours  LORazepam     Tablet 1 milliGRAM(s) Oral every 6 hours  midodrine 10 milliGRAM(s) Oral every 8 hours  pantoprazole    Tablet 40 milliGRAM(s) Oral before breakfast  povidone iodine 10% Solution 1 Application(s) Topical daily  simethicone 80 milliGRAM(s) Chew every 12 hours    MEDICATIONS  (PRN):  acetaminophen     Tablet .. 650 milliGRAM(s) Oral every 6 hours PRN Temp greater or equal to 38C (100.4F), Mild Pain (1 - 3)  aluminum hydroxide/magnesium hydroxide/simethicone Suspension 30 milliLiter(s) Oral every 4 hours PRN Dyspepsia  dextrose Oral Gel 15 Gram(s) Oral once PRN Blood Glucose LESS THAN 70 milliGRAM(s)/deciliter  LORazepam   Injectable 1 milliGRAM(s) IV Push every 4 hours PRN Agitation  melatonin 3 milliGRAM(s) Oral at bedtime PRN Insomnia  ondansetron Injectable 4 milliGRAM(s) IV Push every 8 hours PRN Nausea and/or Vomiting      Allergies    codeine (Hives)    Intolerances        Review of Systems:    General:  No wt loss, fevers, chills, night sweats,fatigue,   Eyes:  Good vision, no reported pain  ENT:  No sore throat, pain, runny nose, dysphagia  CV:  No pain, palpitatioins, hypo/hypertension  Resp:  No dyspnea, cough, tachypnea, wheezing  GI:  No pain, No nausea, No vomiting, No diarrhea, No constipatiion, No weight loss, No fever, No pruritis, No rectal bleeding, No tarry stools, No dysphagia,  :  No pain, bleeding, incontinence, nocturia  Muscle:  No pain, weakness  Neuro:  No weakness, tingling, memory problems  Psych:  No fatigue, insomnia, mood problems, depression  Endocrine:  No polyuria, polydypsia, cold/heat intolerance  Heme:  No petechiae, ecchymosis, easy bruisability  Skin:  No rash, tattoos, scars, edema      Vital Signs Last 24 Hrs  T(C): 37.4 (27 Oct 2022 08:19), Max: 37.4 (26 Oct 2022 20:00)  T(F): 99.4 (27 Oct 2022 08:19), Max: 99.4 (26 Oct 2022 20:00)  HR: 67 (27 Oct 2022 10:58) (61 - 82)  BP: 124/61 (27 Oct 2022 10:00) (110/63 - 124/61)  BP(mean): 81 (27 Oct 2022 10:00) (76 - 89)  RR: 16 (27 Oct 2022 10:00) (16 - 33)  SpO2: 99% (27 Oct 2022 10:58) (95% - 100%)    Parameters below as of 27 Oct 2022 10:00  Patient On (Oxygen Delivery Method): ventilator    O2 Concentration (%): 50    PHYSICAL EXAM:    Constitutional: NAD, well-developed  HEENT: EOMI, throat clear  Neck: No LAD, supple  Respiratory: CTA and P  Cardiovascular: S1 and S2, RRR, no M  Gastrointestinal: BS+, soft, NT/ND, neg HSM,  Extremities: No peripheral edema, neg clubing, cyanosis  Vascular: 2+ peripheral pulses  Neurological: A/O x 3, no focal deficits  Psychiatric: Normal mood, normal affect  Skin: No rashes      LABS:                        8.0    10.05 )-----------( 178      ( 27 Oct 2022 06:00 )             27.4     10-27    141  |  107  |  30<H>  ----------------------------<  165<H>  3.4<L>   |  26  |  0.97    Ca    8.6      27 Oct 2022 06:00    TPro  6.8  /  Alb  1.5<L>  /  TBili  0.3  /  DBili  x   /  AST  12  /  ALT  15  /  AlkPhos  108  10-27          RADIOLOGY & ADDITIONAL TESTS:

## 2022-10-27 NOTE — PROGRESS NOTE ADULT - ASSESSMENT
76F with dementia, COPD with chronic resp failure and is vent dependent, s/p PEG, hx of cardiac arrest, CHF, cor pulmonale, CVA, COVID-19 infxn, CKD, anemia, multiple admissions    dementia  COPD  chr resp failure  vegetative state  dysphagia  peg  hx of card arrest - anoxic brain  CHF  cva    Fio2 titration in progress for preparation to DC back to SNF   on emp ABX  CCM notes reviewed  vs noted  labs reviewed  Wound care in progress -   GI consult noted  Fentanyl added for pain - vent synchrony    Diagnosis; Prognosis; MOLST Discussed  Marciano -   HCP  pt is full code  spoke with  -

## 2022-10-27 NOTE — PROGRESS NOTE ADULT - ASSESSMENT
Pt is a 76W w/ PMHx of DM2, COPD, HTN, dementia, hx of subarachnoid hemorrhage, and history of chronic trach brought in by ambulance for evaluation of low CO2 and cyanotic appearance.   Well-known and has had multiple admissions for Acute on chronic RF and PNA w/ MDROs    Acute VAP/PNA  Acute on chronic HF  CAUTI/UTI  - pt p/w cyanosis  - most recent admission in end of August, most recent cx w/ Pseudomonas and Stenotrophomonas  - labs notable for leukocytosis to 13  - CT chest Groundglass opacities and interlobular septal thickening suggestive of CHF. There are also some more confluent areas of density   which may represent atelectasis versus infiltrates.Bilateral pleural effusions have decreased from the prior exam  - CT A/P w/o acute findings  - s/p levofloxacin in the ED  - UCx E.coli   - BCx NGTD  - sputum cx w/ Stenotrophomonas maltophilia  Plan:   S/p levofloxacin x7 day course, completed PNA tx  C/w ceftriaxone 1gm q24h for  UTI/CAUTI, plan for x10 day course with switch to PO cefpodoxime on discharge  Trend temps and cbc--leukocytosis down  Pulmonary toilet  Isolation per infection control policy given hx of CRE  Supportive care/vent management per [facility]    Infectious Diseases will continue to follow. Please call with any questions.   Andressa Brantley M.D.  [facility] Division of Infectious Diseases [phone]

## 2022-10-27 NOTE — PROGRESS NOTE ADULT - SUBJECTIVE AND OBJECTIVE BOX
Patient is a 76y old  Female who presents with a chief complaint of Acute on chronic hypoxemic respiratory failure.      INTERVAL HPI/OVERNIGHT EVENTS: Pt is nonverbal and cannot provide ROS. No significant acute events overnight.     MEDICATIONS  (STANDING):  ALBUTerol    90 MICROgram(s) HFA Inhaler 2 Puff(s) Inhalation every 6 hours  cadexomer iodine 0.9% Gel 1 Application(s) Topical <User Schedule>  cefTRIAXone   IVPB      cefTRIAXone   IVPB 1000 milliGRAM(s) IV Intermittent every 24 hours  chlorhexidine 0.12% Liquid 15 milliLiter(s) Oral Mucosa every 12 hours  chlorhexidine 2% Cloths 1 Application(s) Topical two times a day  dextrose 5%. 1000 milliLiter(s) (100 mL/Hr) IV Continuous <Continuous>  dextrose 5%. 1000 milliLiter(s) (50 mL/Hr) IV Continuous <Continuous>  dextrose 50% Injectable 25 Gram(s) IV Push once  dextrose 50% Injectable 12.5 Gram(s) IV Push once  dextrose 50% Injectable 25 Gram(s) IV Push once  glucagon  Injectable 1 milliGRAM(s) IntraMuscular once  heparin   Injectable 5000 Unit(s) SubCutaneous every 12 hours  insulin lispro (ADMELOG) corrective regimen sliding scale   SubCutaneous every 6 hours  ipratropium 17 MICROgram(s) HFA Inhaler 1 Puff(s) Inhalation every 6 hours  LORazepam     Tablet 1 milliGRAM(s) Oral every 6 hours  midodrine 10 milliGRAM(s) Oral every 8 hours  pantoprazole    Tablet 40 milliGRAM(s) Oral before breakfast  povidone iodine 10% Solution 1 Application(s) Topical daily  simethicone 80 milliGRAM(s) Chew every 12 hours    MEDICATIONS  (PRN):  acetaminophen     Tablet .. 650 milliGRAM(s) Oral every 6 hours PRN Temp greater or equal to 38C (100.4F), Mild Pain (1 - 3)  aluminum hydroxide/magnesium hydroxide/simethicone Suspension 30 milliLiter(s) Oral every 4 hours PRN Dyspepsia  dextrose Oral Gel 15 Gram(s) Oral once PRN Blood Glucose LESS THAN 70 milliGRAM(s)/deciliter  LORazepam   Injectable 1 milliGRAM(s) IV Push every 4 hours PRN Agitation  melatonin 3 milliGRAM(s) Oral at bedtime PRN Insomnia  ondansetron Injectable 4 milliGRAM(s) IV Push every 8 hours PRN Nausea and/or Vomiting        Allergies    codeine (Hives)    Intolerances        REVIEW OF SYSTEMS:  Pt is nonverbal and cannot provide ROS.    ICU Vital Signs Last 24 Hrs  T(C): 37.1 (27 Oct 2022 16:00), Max: 37.7 (27 Oct 2022 12:44)  T(F): 98.7 (27 Oct 2022 16:00), Max: 99.8 (27 Oct 2022 12:44)  HR: 72 (27 Oct 2022 18:00) (61 - 125)  BP: 135/70 (27 Oct 2022 18:00) (100/57 - 173/78)  BP(mean): 89 (27 Oct 2022 18:00) (71 - 106)  ABP: --  ABP(mean): --  RR: 30 (27 Oct 2022 18:00) (16 - 33)  SpO2: 100% (27 Oct 2022 18:00) (93% - 100%)    O2 Parameters below as of 27 Oct 2022 18:00  Patient On (Oxygen Delivery Method): ventilator      PHYSICAL EXAM:  GENERAL: chronically ill-appearing, contracted  HEENT:  anicteric, dry mucous membranes, mouth agape at baseline ; trach with vent  CHEST/LUNG:  decreased breath sounds in lower lung fields  HEART:  RRR, S1, S2  ABDOMEN:  +BS, soft, no apparent tenderness, distended, PEG in place  EXTREMITIES: limb contractures; no cyanosis or apparent calf tenderness  NERVOUS SYSTEM: cannot answer questions and does not follow commands at baseline    LABS:                                   8.0    10.05 )-----------( 178      ( 27 Oct 2022 06:00 )             27.4     CBC Full  -  ( 27 Oct 2022 06:00 )  WBC Count : 10.05 K/uL  Hemoglobin : 8.0 g/dL  Hematocrit : 27.4 %  Platelet Count - Automated : 178 K/uL  Mean Cell Volume : 92.6 fl  Mean Cell Hemoglobin : 27.0 pg  Mean Cell Hemoglobin Concentration : 29.2 gm/dL  Auto Neutrophil # : x  Auto Lymphocyte # : x  Auto Monocyte # : x  Auto Eosinophil # : x  Auto Basophil # : x  Auto Neutrophil % : x  Auto Lymphocyte % : x  Auto Monocyte % : x  Auto Eosinophil % : x  Auto Basophil % : x    10-27    141  |  107  |  30<H>  ----------------------------<  165<H>  3.4<L>   |  26  |  0.97    Ca    8.6      27 Oct 2022 06:00    TPro  6.8  /  Alb  1.5<L>  /  TBili  0.3  /  DBili  x   /  AST  12  /  ALT  15  /  AlkPhos  108  10-27          CAPILLARY BLOOD GLUCOSE      POCT Blood Glucose.: 213 mg/dL (27 Oct 2022 17:45)  POCT Blood Glucose.: 236 mg/dL (27 Oct 2022 11:31)  POCT Blood Glucose.: 158 mg/dL (27 Oct 2022 05:33)  POCT Blood Glucose.: 200 mg/dL (26 Oct 2022 23:56)            RADIOLOGY & ADDITIONAL TESTS:    Personally reviewed.     Consultant(s) Notes Reviewed:  [x] YES  [ ] NO     Patient is a 76y old  Female who presents with a chief complaint of Acute on chronic hypoxemic respiratory failure.       INTERVAL HPI/OVERNIGHT EVENTS: Pt is nonverbal and cannot provide ROS. No significant acute events overnight.     MEDICATIONS  (STANDING):  ALBUTerol    90 MICROgram(s) HFA Inhaler 2 Puff(s) Inhalation every 6 hours  cadexomer iodine 0.9% Gel 1 Application(s) Topical <User Schedule>  cefTRIAXone   IVPB      cefTRIAXone   IVPB 1000 milliGRAM(s) IV Intermittent every 24 hours  chlorhexidine 0.12% Liquid 15 milliLiter(s) Oral Mucosa every 12 hours  chlorhexidine 2% Cloths 1 Application(s) Topical two times a day  dextrose 5%. 1000 milliLiter(s) (100 mL/Hr) IV Continuous <Continuous>  dextrose 5%. 1000 milliLiter(s) (50 mL/Hr) IV Continuous <Continuous>  dextrose 50% Injectable 25 Gram(s) IV Push once  dextrose 50% Injectable 12.5 Gram(s) IV Push once  dextrose 50% Injectable 25 Gram(s) IV Push once  glucagon  Injectable 1 milliGRAM(s) IntraMuscular once  heparin   Injectable 5000 Unit(s) SubCutaneous every 12 hours  insulin lispro (ADMELOG) corrective regimen sliding scale   SubCutaneous every 6 hours  ipratropium 17 MICROgram(s) HFA Inhaler 1 Puff(s) Inhalation every 6 hours  LORazepam     Tablet 1 milliGRAM(s) Oral every 6 hours  midodrine 10 milliGRAM(s) Oral every 8 hours  pantoprazole    Tablet 40 milliGRAM(s) Oral before breakfast  povidone iodine 10% Solution 1 Application(s) Topical daily  simethicone 80 milliGRAM(s) Chew every 12 hours    MEDICATIONS  (PRN):  acetaminophen     Tablet .. 650 milliGRAM(s) Oral every 6 hours PRN Temp greater or equal to 38C (100.4F), Mild Pain (1 - 3)  aluminum hydroxide/magnesium hydroxide/simethicone Suspension 30 milliLiter(s) Oral every 4 hours PRN Dyspepsia  dextrose Oral Gel 15 Gram(s) Oral once PRN Blood Glucose LESS THAN 70 milliGRAM(s)/deciliter  LORazepam   Injectable 1 milliGRAM(s) IV Push every 4 hours PRN Agitation  melatonin 3 milliGRAM(s) Oral at bedtime PRN Insomnia  ondansetron Injectable 4 milliGRAM(s) IV Push every 8 hours PRN Nausea and/or Vomiting        Allergies    codeine (Hives)    Intolerances        REVIEW OF SYSTEMS:  Pt is nonverbal and cannot provide ROS.    ICU Vital Signs Last 24 Hrs  T(C): 37.1 (27 Oct 2022 16:00), Max: 37.7 (27 Oct 2022 12:44)  T(F): 98.7 (27 Oct 2022 16:00), Max: 99.8 (27 Oct 2022 12:44)  HR: 72 (27 Oct 2022 18:00) (61 - 125)  BP: 135/70 (27 Oct 2022 18:00) (100/57 - 173/78)  BP(mean): 89 (27 Oct 2022 18:00) (71 - 106)  ABP: --  ABP(mean): --  RR: 30 (27 Oct 2022 18:00) (16 - 33)  SpO2: 100% (27 Oct 2022 18:00) (93% - 100%)    O2 Parameters below as of 27 Oct 2022 18:00  Patient On (Oxygen Delivery Method): ventilator      PHYSICAL EXAM:  GENERAL: chronically ill-appearing, contracted  HEENT:  anicteric, dry mucous membranes, mouth agape at baseline ; trach with vent  CHEST/LUNG:  decreased breath sounds in lower lung fields  HEART:  RRR, S1, S2  ABDOMEN:  +BS, soft, no apparent tenderness, distended, PEG in place  EXTREMITIES: limb contractures; no cyanosis or apparent calf tenderness  NERVOUS SYSTEM: cannot answer questions and does not follow commands at baseline    LABS:                                   8.0    10.05 )-----------( 178      ( 27 Oct 2022 06:00 )             27.4     CBC Full  -  ( 27 Oct 2022 06:00 )  WBC Count : 10.05 K/uL  Hemoglobin : 8.0 g/dL  Hematocrit : 27.4 %  Platelet Count - Automated : 178 K/uL  Mean Cell Volume : 92.6 fl  Mean Cell Hemoglobin : 27.0 pg  Mean Cell Hemoglobin Concentration : 29.2 gm/dL  Auto Neutrophil # : x  Auto Lymphocyte # : x  Auto Monocyte # : x  Auto Eosinophil # : x  Auto Basophil # : x  Auto Neutrophil % : x  Auto Lymphocyte % : x  Auto Monocyte % : x  Auto Eosinophil % : x  Auto Basophil % : x    10-27    141  |  107  |  30<H>  ----------------------------<  165<H>  3.4<L>   |  26  |  0.97    Ca    8.6      27 Oct 2022 06:00    TPro  6.8  /  Alb  1.5<L>  /  TBili  0.3  /  DBili  x   /  AST  12  /  ALT  15  /  AlkPhos  108  10-27          CAPILLARY BLOOD GLUCOSE      POCT Blood Glucose.: 213 mg/dL (27 Oct 2022 17:45)  POCT Blood Glucose.: 236 mg/dL (27 Oct 2022 11:31)  POCT Blood Glucose.: 158 mg/dL (27 Oct 2022 05:33)  POCT Blood Glucose.: 200 mg/dL (26 Oct 2022 23:56)            RADIOLOGY & ADDITIONAL TESTS:    Personally reviewed.     Consultant(s) Notes Reviewed:  [x] YES  [ ] NO

## 2022-10-27 NOTE — PROGRESS NOTE ADULT - SUBJECTIVE AND OBJECTIVE BOX
ICU Progress Note    HPI:    S:    Pt seen and examined  HD #  Pt here for      Allergies    codeine (Hives)    Intolerances        MEDICATIONS  (STANDING):  ALBUTerol    90 MICROgram(s) HFA Inhaler 2 Puff(s) Inhalation every 6 hours  cadexomer iodine 0.9% Gel 1 Application(s) Topical <User Schedule>  cefTRIAXone   IVPB      cefTRIAXone   IVPB 1000 milliGRAM(s) IV Intermittent every 24 hours  chlorhexidine 0.12% Liquid 15 milliLiter(s) Oral Mucosa every 12 hours  chlorhexidine 2% Cloths 1 Application(s) Topical two times a day  dextrose 5%. 1000 milliLiter(s) (100 mL/Hr) IV Continuous <Continuous>  dextrose 5%. 1000 milliLiter(s) (50 mL/Hr) IV Continuous <Continuous>  dextrose 50% Injectable 25 Gram(s) IV Push once  dextrose 50% Injectable 12.5 Gram(s) IV Push once  dextrose 50% Injectable 25 Gram(s) IV Push once  glucagon  Injectable 1 milliGRAM(s) IntraMuscular once  heparin   Injectable 5000 Unit(s) SubCutaneous every 12 hours  insulin lispro (ADMELOG) corrective regimen sliding scale   SubCutaneous every 6 hours  ipratropium 17 MICROgram(s) HFA Inhaler 1 Puff(s) Inhalation every 6 hours  LORazepam     Tablet 1 milliGRAM(s) Oral every 6 hours  midodrine 10 milliGRAM(s) Oral every 8 hours  pantoprazole    Tablet 40 milliGRAM(s) Oral before breakfast  povidone iodine 10% Solution 1 Application(s) Topical daily  simethicone 80 milliGRAM(s) Chew every 12 hours    MEDICATIONS  (PRN):  acetaminophen     Tablet .. 650 milliGRAM(s) Oral every 6 hours PRN Temp greater or equal to 38C (100.4F), Mild Pain (1 - 3)  aluminum hydroxide/magnesium hydroxide/simethicone Suspension 30 milliLiter(s) Oral every 4 hours PRN Dyspepsia  dextrose Oral Gel 15 Gram(s) Oral once PRN Blood Glucose LESS THAN 70 milliGRAM(s)/deciliter  LORazepam   Injectable 1 milliGRAM(s) IV Push every 4 hours PRN Agitation  melatonin 3 milliGRAM(s) Oral at bedtime PRN Insomnia  ondansetron Injectable 4 milliGRAM(s) IV Push every 8 hours PRN Nausea and/or Vomiting      Drug Dosing Weight  Height (cm): 165.1 (18 Oct 2022 11:15)  Weight (kg): 49.1 (18 Oct 2022 15:00)  BMI (kg/m2): 18 (18 Oct 2022 15:00)  BSA (m2): 1.52 (18 Oct 2022 15:00)    PAST MEDICAL & SURGICAL HISTORY:  Dementia of frontal lobe type      Aphasic stroke      Diabetes mellitus      Respiratory failure      Hypertension      GERD (gastroesophageal reflux disease)      Constipation      Respiratory failure      CVA (cerebral vascular accident)      HTN (hypertension)      DM (diabetes mellitus)      Advanced dementia      COVID-19 virus detected      Quadriplegia      Pneumonia      Type II diabetes mellitus      Hx of appendectomy      Gastrostomy in place      Tracheostomy in place      Tracheostomy tube present      Feeding by G-tube          FAMILY HISTORY:      ROS: See HPI; otherwise, all systems reviewed and negative.    O:    ICU Vital Signs Last 24 Hrs  T(C): 37.1 (27 Oct 2022 16:00), Max: 37.7 (27 Oct 2022 12:44)  T(F): 98.7 (27 Oct 2022 16:00), Max: 99.8 (27 Oct 2022 12:44)  HR: 72 (27 Oct 2022 18:00) (61 - 125)  BP: 135/70 (27 Oct 2022 18:00) (100/57 - 173/78)  BP(mean): 89 (27 Oct 2022 18:00) (71 - 106)  ABP: --  ABP(mean): --  RR: 30 (27 Oct 2022 18:00) (16 - 33)  SpO2: 100% (27 Oct 2022 18:00) (93% - 100%)    O2 Parameters below as of 27 Oct 2022 18:00  Patient On (Oxygen Delivery Method): ventilator                I&O's Detail    26 Oct 2022 07:01  -  27 Oct 2022 07:00  --------------------------------------------------------  IN:    Free Water: 735 mL    IV PiggyBack: 50 mL    Nepro with Carb Steady: 840 mL  Total IN: 1625 mL    OUT:    Indwelling Catheter - Urethral (mL): 1200 mL  Total OUT: 1200 mL    Total NET: 425 mL      27 Oct 2022 07:01  -  27 Oct 2022 19:48  --------------------------------------------------------  IN:    Free Water: 70 mL    IV PiggyBack: 50 mL    Nepro with Carb Steady: 440 mL  Total IN: 560 mL    OUT:  Total OUT: 0 mL    Total NET: 560 mL          Mode: AC/ CMV (Assist Control/ Continuous Mandatory Ventilation)  RR (machine): 18  TV (machine): 400  FiO2: 55  PEEP: 5  ITime: 1  MAP: 15  PIP: 28      PE:    Constitutional: Healthy M lying in bed in NAD.   Neck: No JVD, trachea midline.   Respiratory: CTA B/L good BS B/L no W/R/R.  Cardiovascular: S1S2+ RRR no M/R/G.  Gastrointestinal: Soft, NTND.  Extremities: No peripheral edema, No cyanosis, clubbing.  Neurological: Awake, conversive, alert, no gross deficits.  Skin: No rashes, warm, moist.    LABS:    CBC Full  -  ( 27 Oct 2022 06:00 )  WBC Count : 10.05 K/uL  RBC Count : 2.96 M/uL  Hemoglobin : 8.0 g/dL  Hematocrit : 27.4 %  Platelet Count - Automated : 178 K/uL  Mean Cell Volume : 92.6 fl  Mean Cell Hemoglobin : 27.0 pg  Mean Cell Hemoglobin Concentration : 29.2 gm/dL  Auto Neutrophil # : x  Auto Lymphocyte # : x  Auto Monocyte # : x  Auto Eosinophil # : x  Auto Basophil # : x  Auto Neutrophil % : x  Auto Lymphocyte % : x  Auto Monocyte % : x  Auto Eosinophil % : x  Auto Basophil % : x    10-27    141  |  107  |  30<H>  ----------------------------<  165<H>  3.4<L>   |  26  |  0.97    Ca    8.6      27 Oct 2022 06:00    TPro  6.8  /  Alb  1.5<L>  /  TBili  0.3  /  DBili  x   /  AST  12  /  ALT  15  /  AlkPhos  108  10-27        CAPILLARY BLOOD GLUCOSE      POCT Blood Glucose.: 213 mg/dL (27 Oct 2022 17:45)  POCT Blood Glucose.: 236 mg/dL (27 Oct 2022 11:31)  POCT Blood Glucose.: 158 mg/dL (27 Oct 2022 05:33)  POCT Blood Glucose.: 200 mg/dL (26 Oct 2022 23:56)        LIVER FUNCTIONS - ( 27 Oct 2022 06:00 )  Alb: 1.5 g/dL / Pro: 6.8 g/dL / ALK PHOS: 108 U/L / ALT: 15 U/L DA / AST: 12 U/L / GGT: x               A:    76yFemale  HD #    Here for:    1.    P:    Neuro: GCS 15. Monitor for delirium.  Continue to optimize pain control. Serial Neurologic assessments.    HEENT: No issues.    CV: Continue hemodynamic monitoring    Pulm: Pulmonary toilet.  Continue incentive spirometer.  Chest PT.  Encourage OOB to chair and ambulation. Nebs. f/u ABG, CXR.    GI/Nutrition: Cont diet, bowel regimen.    /Renal: Monitor UOP. Monitor BMP.  Replete Lytes as needed.    HEME- Chemical and mechanical DVT ppx. f/u CBC. f/u coags as needed.    ID:  Cont abx. f/u Cx's.    Lines/Tubes:     Endo: Maintain euglycemia.    Skin:  Cont skin care, pressure ulcer prevention.    Dispo: Cont care.       ICU Progress Note    S: Spoke to  at length about his wifes condition and laboratory values. Explained that the lactate level was not measured due to no risk factors posing worsening shock state. We also discussed the recent procalcitonin level result, and the fact that it has trended down from 23 since her admission. He is concerned about her having a pleural effusion.       Allergies    codeine (Hives)    Intolerances        MEDICATIONS  (STANDING):  ALBUTerol    90 MICROgram(s) HFA Inhaler 2 Puff(s) Inhalation every 6 hours  cadexomer iodine 0.9% Gel 1 Application(s) Topical <User Schedule>  cefTRIAXone   IVPB      cefTRIAXone   IVPB 1000 milliGRAM(s) IV Intermittent every 24 hours  chlorhexidine 0.12% Liquid 15 milliLiter(s) Oral Mucosa every 12 hours  chlorhexidine 2% Cloths 1 Application(s) Topical two times a day  dextrose 5%. 1000 milliLiter(s) (100 mL/Hr) IV Continuous <Continuous>  dextrose 5%. 1000 milliLiter(s) (50 mL/Hr) IV Continuous <Continuous>  dextrose 50% Injectable 25 Gram(s) IV Push once  dextrose 50% Injectable 12.5 Gram(s) IV Push once  dextrose 50% Injectable 25 Gram(s) IV Push once  glucagon  Injectable 1 milliGRAM(s) IntraMuscular once  heparin   Injectable 5000 Unit(s) SubCutaneous every 12 hours  insulin lispro (ADMELOG) corrective regimen sliding scale   SubCutaneous every 6 hours  ipratropium 17 MICROgram(s) HFA Inhaler 1 Puff(s) Inhalation every 6 hours  LORazepam     Tablet 1 milliGRAM(s) Oral every 6 hours  midodrine 10 milliGRAM(s) Oral every 8 hours  pantoprazole    Tablet 40 milliGRAM(s) Oral before breakfast  povidone iodine 10% Solution 1 Application(s) Topical daily  simethicone 80 milliGRAM(s) Chew every 12 hours    MEDICATIONS  (PRN):  acetaminophen     Tablet .. 650 milliGRAM(s) Oral every 6 hours PRN Temp greater or equal to 38C (100.4F), Mild Pain (1 - 3)  aluminum hydroxide/magnesium hydroxide/simethicone Suspension 30 milliLiter(s) Oral every 4 hours PRN Dyspepsia  dextrose Oral Gel 15 Gram(s) Oral once PRN Blood Glucose LESS THAN 70 milliGRAM(s)/deciliter  LORazepam   Injectable 1 milliGRAM(s) IV Push every 4 hours PRN Agitation  melatonin 3 milliGRAM(s) Oral at bedtime PRN Insomnia  ondansetron Injectable 4 milliGRAM(s) IV Push every 8 hours PRN Nausea and/or Vomiting      Drug Dosing Weight  Height (cm): 165.1 (18 Oct 2022 11:15)  Weight (kg): 49.1 (18 Oct 2022 15:00)  BMI (kg/m2): 18 (18 Oct 2022 15:00)  BSA (m2): 1.52 (18 Oct 2022 15:00)    PAST MEDICAL & SURGICAL HISTORY:  Dementia of frontal lobe type      Aphasic stroke      Diabetes mellitus      Respiratory failure      Hypertension      GERD (gastroesophageal reflux disease)      Constipation      Respiratory failure      CVA (cerebral vascular accident)      HTN (hypertension)      DM (diabetes mellitus)      Advanced dementia      COVID-19 virus detected      Quadriplegia      Pneumonia      Type II diabetes mellitus      Hx of appendectomy      Gastrostomy in place      Tracheostomy in place      Tracheostomy tube present      Feeding by G-tube          FAMILY HISTORY:      ROS: See HPI; otherwise, all systems reviewed and negative.    O:    ICU Vital Signs Last 24 Hrs  T(C): 37.1 (27 Oct 2022 16:00), Max: 37.7 (27 Oct 2022 12:44)  T(F): 98.7 (27 Oct 2022 16:00), Max: 99.8 (27 Oct 2022 12:44)  HR: 72 (27 Oct 2022 18:00) (61 - 125)  BP: 135/70 (27 Oct 2022 18:00) (100/57 - 173/78)  BP(mean): 89 (27 Oct 2022 18:00) (71 - 106)  ABP: --  ABP(mean): --  RR: 30 (27 Oct 2022 18:00) (16 - 33)  SpO2: 100% (27 Oct 2022 18:00) (93% - 100%)    O2 Parameters below as of 27 Oct 2022 18:00  Patient On (Oxygen Delivery Method): ventilator                I&O's Detail    26 Oct 2022 07:01  -  27 Oct 2022 07:00  --------------------------------------------------------  IN:    Free Water: 735 mL    IV PiggyBack: 50 mL    Nepro with Carb Steady: 840 mL  Total IN: 1625 mL    OUT:    Indwelling Catheter - Urethral (mL): 1200 mL  Total OUT: 1200 mL    Total NET: 425 mL      27 Oct 2022 07:01  -  27 Oct 2022 19:48  --------------------------------------------------------  IN:    Free Water: 70 mL    IV PiggyBack: 50 mL    Nepro with Carb Steady: 440 mL  Total IN: 560 mL    OUT:  Total OUT: 0 mL    Total NET: 560 mL          Mode: AC/ CMV (Assist Control/ Continuous Mandatory Ventilation)  RR (machine): 18  TV (machine): 400  FiO2: 55  PEEP: 5  ITime: 1  MAP: 15  PIP: 28      PE:    Constitutional: NAD.   Neck: No JVD, trachea midline.   Respiratory: CTA B/L good BS B/L no W/R/R.  Cardiovascular: S1S2+ RRR no M/R/G.  Gastrointestinal: Soft, NTND.  Extremities: wwp  Neurological:  no gross deficits.  Skin: No rashes, warm, moist.    LABS:    CBC Full  -  ( 27 Oct 2022 06:00 )  WBC Count : 10.05 K/uL  RBC Count : 2.96 M/uL  Hemoglobin : 8.0 g/dL  Hematocrit : 27.4 %  Platelet Count - Automated : 178 K/uL  Mean Cell Volume : 92.6 fl  Mean Cell Hemoglobin : 27.0 pg  Mean Cell Hemoglobin Concentration : 29.2 gm/dL  Auto Neutrophil # : x  Auto Lymphocyte # : x  Auto Monocyte # : x  Auto Eosinophil # : x  Auto Basophil # : x  Auto Neutrophil % : x  Auto Lymphocyte % : x  Auto Monocyte % : x  Auto Eosinophil % : x  Auto Basophil % : x    10-27    141  |  107  |  30<H>  ----------------------------<  165<H>  3.4<L>   |  26  |  0.97    Ca    8.6      27 Oct 2022 06:00    TPro  6.8  /  Alb  1.5<L>  /  TBili  0.3  /  DBili  x   /  AST  12  /  ALT  15  /  AlkPhos  108  10-27        CAPILLARY BLOOD GLUCOSE      POCT Blood Glucose.: 213 mg/dL (27 Oct 2022 17:45)  POCT Blood Glucose.: 236 mg/dL (27 Oct 2022 11:31)  POCT Blood Glucose.: 158 mg/dL (27 Oct 2022 05:33)  POCT Blood Glucose.: 200 mg/dL (26 Oct 2022 23:56)        LIVER FUNCTIONS - ( 27 Oct 2022 06:00 )  Alb: 1.5 g/dL / Pro: 6.8 g/dL / ALK PHOS: 108 U/L / ALT: 15 U/L DA / AST: 12 U/L / GGT: x               A:    76yFemale  HD #    1. Acute on Chronic O2 RF  2. PNA  3. Septic Shock --- Resolved  4. Cannot Exclude CHF exac component  5. UTI  6. Acute on chornic anemia.    Plan  Neuro; Sedation as needed. Limit narcotics.   Pulm: Will obtain CXR as needed to eval effusions. FIO2 continues to titrate up and down but otherwise stable.   Cardio: Stable no issues  GI: Tube feeds. PPI  Renal: Cr stabke. Trends Is and Is  Heme; SQH for DVT ppx  ID: Cont Ceftriaxone for CAUTI with urine culture growing ECOLI. BC NGTD. Will switch to Cefpodoxime on discharge.   Endo: ISS. Goal -180  Dispo: Critically ill patient with acute hypoxic RF actively trying to wean down oxygenation.

## 2022-10-27 NOTE — PROGRESS NOTE ADULT - ASSESSMENT
REVIEW OF SYMPTOMS      Able to give (reliable) ROS  NO     PHYSICAL EXAM    HEENT Unremarkable  atraumatic   RESP Fair air entry EXP prolonged    Harsh breath sound Resp distres mild   CARDIAC S1 S2 No S3     NO JVD    ABDOMEN SOFT BS PRESENT NOT DISTENDED No hepatosplenomegaly   PEDAL EDEMA present No calf tenderness  NO rash     GENERAL DATA .   GOC.  10/18/2022 full code       ALLGY. codeine                            WT.          10/18/2022 48  BMI.          10/18/2022 17                     ICU STAY. 10/18/2022  COVID.  10/18/2022 scv2 (-)     BEST PRACTICE ISSUES.    HOB ELEVATN. Yes  DVT PPLX.   10/18/2022 hpsc    SQUIRES PPLX.  10/18/2022 protonix 40     INFN PPLX.    10/18/2022 ch;lorhexidine .12%  10/19/2022 chlorhexidine 2%    SP SW EM.        DIET.     10/19/2022 nepro 800 gt    VS/ PO/IO/ VENT/ DRIPS.  10/27/2022 72 130/70   10/27/2022 ac 18/400/5/.5     ASSESSMENT/RECOMMENDATIONS .   RESP.  -- Gas exchange.   10/18/2022 ac 18/400/100/5 751/37/257  -- VENT MANAGEMENT.   HOB elevation  Target Pplat 30 (-)  Target PO 90-95%  Target pH 730 (+)  Daily spontaneous breathing trials   Daily sedation vacation   -- Pleuralk effsn  Past ritchie   R THORAC   6/8/2022  g 161 l 222 p 4.9 p 10 l 16 .38    L THORAC   5/25/2022 g 183 l 144/381 .37 p 3.4/5.3 .64 p 23 l 36   5/25/2022l pl fluid  lymph pred exudate   cyto (-)     Ct  10/18/2022 sm r mod l effs large subpulmonic component   a/r No thoracentesis plannd at this point risk prohibitive in vent pt   -- Mediastinal lne.  Ct  10/18/2022 again enlarged mediastinal lns likely reactive   a/r will need followup with ct in 2m and consider biopsy if persists   -- wheeze.  10/18 albuterol hf  10/18 atrovent hf   INFECTION.  PAST ID ISSUES   8/19/2022  zosyn Se Vignesh  8/19/2022 bactrim ds 2 bid   pmh 5/2/2022 SERRATIA CARBAPENEM RESISTANT PSEUDOMONAS  -- VAP 10/18/2022.  -- UTI 10/18/2022   w 10/18-10/19-10/20-10/21-10/23-10/25-10/26-10/27/2022 w 13 - 8.8 - 15-10.3 - 9.4- 12.3 - 15.6- 11.6  - 10   pr 10/20-10/27/2022 6.6  - .4   ua 10/18/2022 w 11-25   ct ch 10/18/2022 cw 8/18/20232 ggo post upper lobes with interlobular septal thickening infiltrate or atelectasis lower lobes l more than   sm r and sm to mod l effsn  rvp 10/18/2022 (-)   uc 10/18 100K E coli   sp 10/19 Stenotrophomonas   bc 10/18 (-)   10/18/2022 meropenem Dr NEWTON  dc  10/18/2022 ID Dr Brantley on case   10/19/2022 levaquin 750   10/21/2022 rocephin x 7d Dr BRITTANY Brantley      CARDIAC.  -- Hypotension.  10/18/2022 88/48   echo 8/19/2022 n lvsf dd1 pasp 58   10/20 midocrine 10.3   Target MAP 65 (+)   resolvd   GI.  -- ELEVATED LFTS.  LFTS 10/18-10/19-10/20/2022   ap 140-131- 104  ast - 44  alt 21 - 103 - 64   monitor  HEMAT.  -- Anemia.  Hb 10/18-10/19-10/20-10/21-10/22-10/23-10/24-10/25-10/26-10/27/2022 Hb 7.4 -  6.3 - 8.2  - 7.4 - 8 - 8.7 - 8.4 - 8.8 - 7.7 - 8   inr 10/18/2022 inr 1.23   ctap 10/20 No rp bl  Monitor  target Hb 7 (+)   -- TRANSFUSION  10/19/2022 2 U PRBC     OVERALL ASSESSMENT/DISPOSITION.  76 f PMH trach peg cva cac anoxic encephalo PH 8/19/2022 pasp 58 bl pl effs  r thora 6/8/2022 and l thora 5/25/2022 were lp exudates  recurrent hospitalizations HO CR psuedomonas 5/2/2022 zosyn 8/19/2022 admitted 10/18/2022 with resp distress     VAP   UTI   uc 10/18 100K E coli   sp 10/19 Stenotrophomonas   10/19/2022 levaquin 750   10/21/2022 rocephin x 7d Dr BRITTANY Brantley   Vent mgmt   Hypotension  10/20 midocrine 10.3  Anemia  10/19/2022 2 U PRBC   Woody 10/23/2022      TIME SPENT   Over 39 minutes aggregate critical care time spent on encounter; activities included   direct patient care, counseling and/or coordinating care reviewing notes, lab data/ imaging , discussion with multidisciplinary team/ patient  /family and explaining in detail risks, benefits, alternatives  of the recommendations     CHAPINCITO GUIDRY 76 f [facility] S 10/18/2022   DR ANG PADGETT

## 2022-10-27 NOTE — PROGRESS NOTE ADULT - SUBJECTIVE AND OBJECTIVE BOX
BREA BECKHAM     [facility]    Allergies    codeine (Hives)    Intolerances        PAST MEDICAL & SURGICAL HISTORY:  Dementia of frontal lobe type      Aphasic stroke      Diabetes mellitus      Respiratory failure      Hypertension      GERD (gastroesophageal reflux disease)      Constipation      Respiratory failure      CVA (cerebral vascular accident)      HTN (hypertension)      DM (diabetes mellitus)      Advanced dementia      COVID-19 virus detected      Quadriplegia      Pneumonia      Type II diabetes mellitus      Hx of appendectomy      Gastrostomy in place      Tracheostomy in place      Tracheostomy tube present      Feeding by G-tube          FAMILY HISTORY:      Home Medications:  albuterol 90 mcg/inh inhalation aerosol with adapter: 2  inhaled every 6 hours (18 Oct 2022 11:42)  Bacid (LAC) oral tablet: 2 tab(s) by gastrostomy tube once a day (18 Oct 2022 11:42)  Betadine 10% topical swab: cleanse right and left great toes (18 Oct 2022 11:42)  chlorhexidine 0.12% mucous membrane liquid: 15 milliliter(s) mucous membrane 2 times a day (18 Oct 2022 11:42)  Eucerin topical cream: Apply topically to affected area once a day bilateral feet (18 Oct 2022 11:42)  insulin glargine 100 units/mL subcutaneous solution: 8 unit(s) subcutaneous once a day (in the morning) (18 Oct 2022 11:42)  ipratropium-albuterol 0.5 mg-2.5 mg/3 mL inhalation solution: 3 milliliter(s) inhaled 4 times a day (18 Oct 2022 11:42)  LORazepam 1 mg oral tablet: 1 tab(s) by gastrostomy tube every 4 hours (18 Oct 2022 11:42)  methocarbamol 500 mg oral tablet: 1 tab(s) by gastrostomy tube 2 times a day (18 Oct 2022 11:42)  MiraLax oral powder for reconstitution: g-tube (18 Oct 2022 13:04)  Multiple Vitamins oral tablet: 1 tab(s) orally once a day (18 Oct 2022 11:42)  nystatin 100,000 units/g topical powder: 1 application topically 3 times a day (18 Oct 2022 11:42)  omeprazole 20 mg oral delayed release capsule: orally 2 times a day (18 Oct 2022 11:42)  polyethylene glycol 3350 oral powder for reconstitution: 17 gram(s) by gastrostomy tube every 12 hours (18 Oct 2022 11:42)  senna 8.6 mg oral tablet: 3 tab(s) by gastrostomy tube once a day (at bedtime) (18 Oct 2022 11:42)  simethicone 80 mg oral tablet: g-tube (18 Oct 2022 13:04)  simethicone 80 mg oral tablet, chewable: 1 tab(s) by gastrostomy tube every 6 hours (18 Oct 2022 11:42)  sucralfate 1 g/10 mL oral suspension: 10 milliliter(s) g-tube 4 times a day (before meals and at bedtime) (18 Oct 2022 13:04)  Tylenol 325 mg oral tablet: 2 tab(s) by gastrostomy tube once a day; 60 minutes prior to dressing change  (18 Oct 2022 11:42)  Tylenol 325 mg oral tablet: 2 tab(s) by gastrostomy tube every 6 hours, As Needed (18 Oct 2022 11:42)      MEDICATIONS  (STANDING):  ALBUTerol    90 MICROgram(s) HFA Inhaler 2 Puff(s) Inhalation every 6 hours  cadexomer iodine 0.9% Gel 1 Application(s) Topical <User Schedule>  cefTRIAXone   IVPB      cefTRIAXone   IVPB 1000 milliGRAM(s) IV Intermittent every 24 hours  chlorhexidine 0.12% Liquid 15 milliLiter(s) Oral Mucosa every 12 hours  chlorhexidine 2% Cloths 1 Application(s) Topical two times a day  dextrose 5%. 1000 milliLiter(s) (50 mL/Hr) IV Continuous <Continuous>  dextrose 5%. 1000 milliLiter(s) (100 mL/Hr) IV Continuous <Continuous>  dextrose 50% Injectable 25 Gram(s) IV Push once  dextrose 50% Injectable 12.5 Gram(s) IV Push once  dextrose 50% Injectable 25 Gram(s) IV Push once  glucagon  Injectable 1 milliGRAM(s) IntraMuscular once  heparin   Injectable 5000 Unit(s) SubCutaneous every 12 hours  insulin lispro (ADMELOG) corrective regimen sliding scale   SubCutaneous every 6 hours  ipratropium 17 MICROgram(s) HFA Inhaler 1 Puff(s) Inhalation every 6 hours  LORazepam     Tablet 1 milliGRAM(s) Oral every 6 hours  midodrine 10 milliGRAM(s) Oral every 8 hours  pantoprazole    Tablet 40 milliGRAM(s) Oral before breakfast  povidone iodine 10% Solution 1 Application(s) Topical daily  simethicone 80 milliGRAM(s) Chew every 12 hours    MEDICATIONS  (PRN):  acetaminophen     Tablet .. 650 milliGRAM(s) Oral every 6 hours PRN Temp greater or equal to 38C (100.4F), Mild Pain (1 - 3)  aluminum hydroxide/magnesium hydroxide/simethicone Suspension 30 milliLiter(s) Oral every 4 hours PRN Dyspepsia  dextrose Oral Gel 15 Gram(s) Oral once PRN Blood Glucose LESS THAN 70 milliGRAM(s)/deciliter  LORazepam   Injectable 1 milliGRAM(s) IV Push every 4 hours PRN Agitation  melatonin 3 milliGRAM(s) Oral at bedtime PRN Insomnia  ondansetron Injectable 4 milliGRAM(s) IV Push every 8 hours PRN Nausea and/or Vomiting      Diet, NPO with Tube Feed:   Tube Feeding Modality: Gastrostomy  Nepro with Carb Steady  Total Volume for 24 Hours (mL): 800  Continuous  Starting Tube Feed Rate mL per Hour: 40  Until Goal Tube Feed Rate (mL per Hour): 40  Tube Feed Duration (in Hours): 20  Tube Feed Start Time: 11:00  Free Water Flush  Pump   Rate (mL per Hour): 35   Frequency: Every Hour    Duration (Hours): 24    Start Time: 11:00  Lucas(7 Gm Arginine/7 Gm Glut/1.2 Gm HMB     Qty per Day:  1 (10-25-22 @ 10:53) [Active]          Vital Signs Last 24 Hrs  T(C): 37.1 (27 Oct 2022 04:00), Max: 37.4 (26 Oct 2022 20:00)  T(F): 98.8 (27 Oct 2022 04:00), Max: 99.4 (26 Oct 2022 20:00)  HR: 69 (27 Oct 2022 06:57) (64 - 90)  BP: 115/64 (27 Oct 2022 06:00) (110/63 - 137/81)  BP(mean): 79 (27 Oct 2022 06:00) (76 - 99)  RR: 20 (27 Oct 2022 06:00) (19 - 30)  SpO2: 98% (27 Oct 2022 06:57) (95% - 100%)    Parameters below as of 27 Oct 2022 06:57  Patient On (Oxygen Delivery Method): ventilator,.40          10-26-22 @ 07:01  -  10-27-22 @ 07:00  --------------------------------------------------------  IN: 1625 mL / OUT: 1200 mL / NET: 425 mL        Mode: AC/ CMV (Assist Control/ Continuous Mandatory Ventilation), RR (machine): 18, TV (machine): 400, FiO2: 50, PEEP: 5, ITime: 1, MAP: 14, PIP: 28      LABS:                        8.0    10.05 )-----------( 178      ( 27 Oct 2022 06:00 )             27.4     10-27    141  |  107  |  30<H>  ----------------------------<  165<H>  3.4<L>   |  26  |  0.97    Ca    8.6      27 Oct 2022 06:00    TPro  6.8  /  Alb  1.5<L>  /  TBili  0.3  /  DBili  x   /  AST  12  /  ALT  15  /  AlkPhos  108  10-27              WBC:  WBC Count: 10.05 K/uL (10-27 @ 06:00)  WBC Count: 11.65 K/uL (10-26 @ 06:23)  WBC Count: 15.63 K/uL (10-25 @ 06:37)  WBC Count: 13.05 K/uL (10-24 @ 07:04)      MICROBIOLOGY:  RECENT CULTURES:                  Sodium:  Sodium, Serum: 141 mmol/L (10-27 @ 06:00)  Sodium, Serum: 143 mmol/L (10-26 @ 06:23)  Sodium, Serum: 146 mmol/L (10-25 @ 06:37)  Sodium, Serum: 146 mmol/L (10-24 @ 07:04)      0.97 mg/dL 10-27 @ 06:00  0.94 mg/dL 10-26 @ 06:23  1.01 mg/dL 10-25 @ 06:37  1.13 mg/dL 10-24 @ 07:04      Hemoglobin:  Hemoglobin: 8.0 g/dL (10-27 @ 06:00)  Hemoglobin: 7.7 g/dL (10-26 @ 06:23)  Hemoglobin: 8.6 g/dL (10-25 @ 06:37)  Hemoglobin: 8.4 g/dL (10-24 @ 07:04)      Platelets: Platelet Count - Automated: 178 K/uL (10-27 @ 06:00)  Platelet Count - Automated: 162 K/uL (10-26 @ 06:23)  Platelet Count - Automated: 210 K/uL (10-25 @ 06:37)  Platelet Count - Automated: 224 K/uL (10-24 @ 07:04)      LIVER FUNCTIONS - ( 27 Oct 2022 06:00 )  Alb: 1.5 g/dL / Pro: 6.8 g/dL / ALK PHOS: 108 U/L / ALT: 15 U/L DA / AST: 12 U/L / GGT: x                 RADIOLOGY & ADDITIONAL STUDIES:      MICROBIOLOGY:  RECENT CULTURES:

## 2022-10-28 LAB
ANION GAP SERPL CALC-SCNC: 6 MMOL/L — SIGNIFICANT CHANGE UP (ref 5–17)
BUN SERPL-MCNC: 27 MG/DL — HIGH (ref 7–23)
CALCIUM SERPL-MCNC: 8.7 MG/DL — SIGNIFICANT CHANGE UP (ref 8.4–10.5)
CHLORIDE SERPL-SCNC: 106 MMOL/L — SIGNIFICANT CHANGE UP (ref 96–108)
CO2 SERPL-SCNC: 26 MMOL/L — SIGNIFICANT CHANGE UP (ref 22–31)
CREAT SERPL-MCNC: 0.96 MG/DL — SIGNIFICANT CHANGE UP (ref 0.5–1.3)
EGFR: 61 ML/MIN/1.73M2 — SIGNIFICANT CHANGE UP
GLUCOSE BLDC GLUCOMTR-MCNC: 176 MG/DL — HIGH (ref 70–99)
GLUCOSE BLDC GLUCOMTR-MCNC: 176 MG/DL — HIGH (ref 70–99)
GLUCOSE BLDC GLUCOMTR-MCNC: 185 MG/DL — HIGH (ref 70–99)
GLUCOSE BLDC GLUCOMTR-MCNC: 200 MG/DL — HIGH (ref 70–99)
GLUCOSE BLDC GLUCOMTR-MCNC: 211 MG/DL — HIGH (ref 70–99)
GLUCOSE SERPL-MCNC: 188 MG/DL — HIGH (ref 70–99)
HCT VFR BLD CALC: 26.4 % — LOW (ref 34.5–45)
HGB BLD-MCNC: 7.6 G/DL — LOW (ref 11.5–15.5)
MAGNESIUM SERPL-MCNC: 1.6 MG/DL — SIGNIFICANT CHANGE UP (ref 1.6–2.6)
MCHC RBC-ENTMCNC: 26.8 PG — LOW (ref 27–34)
MCHC RBC-ENTMCNC: 28.8 GM/DL — LOW (ref 32–36)
MCV RBC AUTO: 93 FL — SIGNIFICANT CHANGE UP (ref 80–100)
NRBC # BLD: 0 /100 WBCS — SIGNIFICANT CHANGE UP (ref 0–0)
PHOSPHATE SERPL-MCNC: 4.2 MG/DL — SIGNIFICANT CHANGE UP (ref 2.5–4.5)
PLATELET # BLD AUTO: 158 K/UL — SIGNIFICANT CHANGE UP (ref 150–400)
POTASSIUM SERPL-MCNC: 3.9 MMOL/L — SIGNIFICANT CHANGE UP (ref 3.5–5.3)
POTASSIUM SERPL-SCNC: 3.9 MMOL/L — SIGNIFICANT CHANGE UP (ref 3.5–5.3)
RBC # BLD: 2.84 M/UL — LOW (ref 3.8–5.2)
RBC # FLD: 18.6 % — HIGH (ref 10.3–14.5)
SODIUM SERPL-SCNC: 138 MMOL/L — SIGNIFICANT CHANGE UP (ref 135–145)
WBC # BLD: 8.11 K/UL — SIGNIFICANT CHANGE UP (ref 3.8–10.5)
WBC # FLD AUTO: 8.11 K/UL — SIGNIFICANT CHANGE UP (ref 3.8–10.5)

## 2022-10-28 PROCEDURE — 99233 SBSQ HOSP IP/OBS HIGH 50: CPT

## 2022-10-28 RX ORDER — MAGNESIUM SULFATE 500 MG/ML
1 VIAL (ML) INJECTION ONCE
Refills: 0 | Status: COMPLETED | OUTPATIENT
Start: 2022-10-28 | End: 2022-10-28

## 2022-10-28 RX ADMIN — ALBUTEROL 2 PUFF(S): 90 AEROSOL, METERED ORAL at 07:10

## 2022-10-28 RX ADMIN — MIDODRINE HYDROCHLORIDE 10 MILLIGRAM(S): 2.5 TABLET ORAL at 14:18

## 2022-10-28 RX ADMIN — Medication 1 PUFF(S): at 02:11

## 2022-10-28 RX ADMIN — CEFTRIAXONE 100 MILLIGRAM(S): 500 INJECTION, POWDER, FOR SOLUTION INTRAMUSCULAR; INTRAVENOUS at 15:17

## 2022-10-28 RX ADMIN — CHLORHEXIDINE GLUCONATE 1 APPLICATION(S): 213 SOLUTION TOPICAL at 05:10

## 2022-10-28 RX ADMIN — Medication 1 MILLIGRAM(S): at 21:55

## 2022-10-28 RX ADMIN — ALBUTEROL 2 PUFF(S): 90 AEROSOL, METERED ORAL at 02:12

## 2022-10-28 RX ADMIN — ALBUTEROL 2 PUFF(S): 90 AEROSOL, METERED ORAL at 13:31

## 2022-10-28 RX ADMIN — Medication 1 APPLICATION(S): at 11:35

## 2022-10-28 RX ADMIN — Medication 1 MILLIGRAM(S): at 05:11

## 2022-10-28 RX ADMIN — HEPARIN SODIUM 5000 UNIT(S): 5000 INJECTION INTRAVENOUS; SUBCUTANEOUS at 05:11

## 2022-10-28 RX ADMIN — Medication 2: at 06:25

## 2022-10-28 RX ADMIN — Medication 1 PUFF(S): at 07:10

## 2022-10-28 RX ADMIN — HEPARIN SODIUM 5000 UNIT(S): 5000 INJECTION INTRAVENOUS; SUBCUTANEOUS at 17:24

## 2022-10-28 RX ADMIN — SIMETHICONE 80 MILLIGRAM(S): 80 TABLET, CHEWABLE ORAL at 17:25

## 2022-10-28 RX ADMIN — Medication 2: at 23:41

## 2022-10-28 RX ADMIN — Medication 1 PUFF(S): at 20:09

## 2022-10-28 RX ADMIN — SIMETHICONE 80 MILLIGRAM(S): 80 TABLET, CHEWABLE ORAL at 05:10

## 2022-10-28 RX ADMIN — Medication 2: at 17:29

## 2022-10-28 RX ADMIN — Medication 1 MILLIGRAM(S): at 15:29

## 2022-10-28 RX ADMIN — PANTOPRAZOLE SODIUM 40 MILLIGRAM(S): 20 TABLET, DELAYED RELEASE ORAL at 06:24

## 2022-10-28 RX ADMIN — Medication 4: at 11:56

## 2022-10-28 RX ADMIN — CHLORHEXIDINE GLUCONATE 15 MILLILITER(S): 213 SOLUTION TOPICAL at 05:10

## 2022-10-28 RX ADMIN — ALBUTEROL 2 PUFF(S): 90 AEROSOL, METERED ORAL at 20:09

## 2022-10-28 RX ADMIN — Medication 1 PUFF(S): at 13:31

## 2022-10-28 RX ADMIN — MIDODRINE HYDROCHLORIDE 10 MILLIGRAM(S): 2.5 TABLET ORAL at 05:10

## 2022-10-28 RX ADMIN — Medication 100 GRAM(S): at 11:34

## 2022-10-28 RX ADMIN — CHLORHEXIDINE GLUCONATE 1 APPLICATION(S): 213 SOLUTION TOPICAL at 17:24

## 2022-10-28 RX ADMIN — CHLORHEXIDINE GLUCONATE 15 MILLILITER(S): 213 SOLUTION TOPICAL at 17:24

## 2022-10-28 RX ADMIN — Medication 1 APPLICATION(S): at 11:34

## 2022-10-28 RX ADMIN — Medication 1 MILLIGRAM(S): at 23:41

## 2022-10-28 RX ADMIN — Medication 1 MILLIGRAM(S): at 11:34

## 2022-10-28 RX ADMIN — MIDODRINE HYDROCHLORIDE 10 MILLIGRAM(S): 2.5 TABLET ORAL at 21:39

## 2022-10-28 RX ADMIN — INSULIN GLARGINE 5 UNIT(S): 100 INJECTION, SOLUTION SUBCUTANEOUS at 08:03

## 2022-10-28 RX ADMIN — Medication 1 MILLIGRAM(S): at 17:25

## 2022-10-28 NOTE — PROGRESS NOTE ADULT - SUBJECTIVE AND OBJECTIVE BOX
Significant recent/past 24 hr events:  - Tolerating tube feeds  - Magnesium supplemented  - No other events    Subjective:    Review of Systems         [ ] A ten-point review of systems was otherwise negative except as noted.  [x] Due to baseline cognitive dysfunction, subjective information were not able to be obtained from the patient. History was obtained, to the extent possible, from review of the chart and collateral sources of information.      Patient is a 76y old  Female who presents with a chief complaint of Acute on Hypoxemic respiratory failure (28 Oct 2022 16:29)    HPI:  76F with dementia, COPD with chronic resp failure and is vent dependent, s/p PEG, hx of cardiac arrest, CHF, cor pulmonale, CVA, COVID-19 infxn, CKD, anemia, multiple admissions for respiratory distress (last admission from 6/1-6/9  and now august diagnosed with PNA with parapneumonic pleural effusion s/p course of ertapenam, who presents from [facility] for respiratory distress . Patient not verbal, demented unable to obtain any hx    ER course:  Vitals stable afebrile  Imaging:  labs noted:hb 7.4., wbc 13.,  Bun 122, Cr 2.4   (18 Oct 2022 13:07)    PAST MEDICAL & SURGICAL HISTORY:  Dementia of frontal lobe type      Aphasic stroke      Diabetes mellitus      Respiratory failure      Hypertension      GERD (gastroesophageal reflux disease)      Constipation      Respiratory failure      CVA (cerebral vascular accident)      HTN (hypertension)      DM (diabetes mellitus)      Advanced dementia      COVID-19 virus detected      Quadriplegia      Pneumonia      Type II diabetes mellitus      Hx of appendectomy      Gastrostomy in place      Tracheostomy in place      Tracheostomy tube present      Feeding by G-tube        FAMILY HISTORY:      Vitals   ICU Vital Signs Last 24 Hrs  T(C): 36.7 (28 Oct 2022 18:00), Max: 37.4 (27 Oct 2022 20:00)  T(F): 98 (28 Oct 2022 18:00), Max: 99.3 (27 Oct 2022 20:00)  HR: 71 (28 Oct 2022 18:00) (63 - 79)  BP: 113/66 (28 Oct 2022 18:00) (106/60 - 133/91)  BP(mean): 82 (28 Oct 2022 18:00) (76 - 104)  ABP: --  ABP(mean): --  RR: 29 (28 Oct 2022 18:00) (17 - 38)  SpO2: 100% (28 Oct 2022 18:00) (92% - 100%)    O2 Parameters below as of 28 Oct 2022 18:00  Patient On (Oxygen Delivery Method): ventilator  O2 Flow (L/min): 40          Physical Exam:   Constitutional: NAD, chronically ill appearing  HEENT: PERRLA, no drainage or redness  Neck: No JVD, Trachea midline with trach site CDI  Respiratory: Breath Sounds coarse bilaterally to auscultation, no accessory muscle use noted  Cardiovascular: Regular rate, regular rhythm, normal S1, S2; no murmurs or rub  Gastrointestinal: Soft, non-tender, non distended, no hepatosplenomegaly, normal bowel sounds; PEG dressing CDI  Extremities:  no peripheral edema, no cyanosis, no clubbing   Neurological: Nonverbal; minimally interactive  Psychiatric: Nonverbal; minimally interactive  Skin: warm, dry, well perfused, no rashes    VENT SETTINGS   Mode: AC/ CMV (Assist Control/ Continuous Mandatory Ventilation)  RR (machine): 18  TV (machine): 400  FiO2: 40  PEEP: 5  ITime: 1  MAP: 19  PIP: 33        I&O's Detail    27 Oct 2022 07:01  -  28 Oct 2022 07:00  --------------------------------------------------------  IN:    Free Water: 490 mL    IV PiggyBack: 50 mL    Nepro with Carb Steady: 840 mL  Total IN: 1380 mL    OUT:    Indwelling Catheter - Urethral (mL): 800 mL  Total OUT: 800 mL    Total NET: 580 mL      28 Oct 2022 07:01  -  28 Oct 2022 19:45  --------------------------------------------------------  IN:    Free Water: 420 mL    Nepro with Carb Steady: 480 mL  Total IN: 900 mL    OUT:    Indwelling Catheter - Urethral (mL): 650 mL  Total OUT: 650 mL    Total NET: 250 mL          LABS                        7.6    8.11  )-----------( 158      ( 28 Oct 2022 06:15 )             26.4     10-28    138  |  106  |  27<H>  ----------------------------<  188<H>  3.9   |  26  |  0.96    Ca    8.7      28 Oct 2022 06:15  Phos  4.2     10-28  Mg     1.6     10-28    TPro  6.8  /  Alb  1.5<L>  /  TBili  0.3  /  DBili  x   /  AST  12  /  ALT  15  /  AlkPhos  108  10-27    LIVER FUNCTIONS - ( 27 Oct 2022 06:00 )  Alb: 1.5 g/dL / Pro: 6.8 g/dL / ALK PHOS: 108 U/L / ALT: 15 U/L DA / AST: 12 U/L / GGT: x                     POCT Blood Glucose.: 200 mg/dL *H* (10-28-22 @ 17:27)  POCT Blood Glucose.: 211 mg/dL *H* (10-28-22 @ 11:54)  POCT Blood Glucose.: 176 mg/dL *H* (10-28-22 @ 08:01)  POCT Blood Glucose.: 185 mg/dL *H* (10-28-22 @ 05:56)  POCT Blood Glucose.: 215 mg/dL *H* (10-27-22 @ 23:25)        MEDICATIONS  (STANDING):  ALBUTerol    90 MICROgram(s) HFA Inhaler 2 Puff(s) Inhalation every 6 hours  cadexomer iodine 0.9% Gel 1 Application(s) Topical <User Schedule>  cefTRIAXone   IVPB      cefTRIAXone   IVPB 1000 milliGRAM(s) IV Intermittent every 24 hours  chlorhexidine 0.12% Liquid 15 milliLiter(s) Oral Mucosa every 12 hours  chlorhexidine 2% Cloths 1 Application(s) Topical two times a day  dextrose 5%. 1000 milliLiter(s) (50 mL/Hr) IV Continuous <Continuous>  dextrose 5%. 1000 milliLiter(s) (100 mL/Hr) IV Continuous <Continuous>  dextrose 50% Injectable 25 Gram(s) IV Push once  dextrose 50% Injectable 12.5 Gram(s) IV Push once  dextrose 50% Injectable 25 Gram(s) IV Push once  glucagon  Injectable 1 milliGRAM(s) IntraMuscular once  heparin   Injectable 5000 Unit(s) SubCutaneous every 12 hours  insulin glargine Injectable (LANTUS) 5 Unit(s) SubCutaneous every morning  insulin lispro (ADMELOG) corrective regimen sliding scale   SubCutaneous every 6 hours  ipratropium 17 MICROgram(s) HFA Inhaler 1 Puff(s) Inhalation every 6 hours  LORazepam     Tablet 1 milliGRAM(s) Oral every 6 hours  midodrine 10 milliGRAM(s) Oral every 8 hours  pantoprazole    Tablet 40 milliGRAM(s) Oral before breakfast  povidone iodine 10% Solution 1 Application(s) Topical daily  simethicone 80 milliGRAM(s) Chew every 12 hours    MEDICATIONS  (PRN):  acetaminophen     Tablet .. 650 milliGRAM(s) Oral every 6 hours PRN Temp greater or equal to 38C (100.4F), Mild Pain (1 - 3)  aluminum hydroxide/magnesium hydroxide/simethicone Suspension 30 milliLiter(s) Oral every 4 hours PRN Dyspepsia  dextrose Oral Gel 15 Gram(s) Oral once PRN Blood Glucose LESS THAN 70 milliGRAM(s)/deciliter  LORazepam   Injectable 1 milliGRAM(s) IV Push every 4 hours PRN Agitation  melatonin 3 milliGRAM(s) Oral at bedtime PRN Insomnia  ondansetron Injectable 4 milliGRAM(s) IV Push every 8 hours PRN Nausea and/or Vomiting      Allergies:  codeine (Hives)

## 2022-10-28 NOTE — PROGRESS NOTE ADULT - SUBJECTIVE AND OBJECTIVE BOX
[facility], Division of Infectious Diseases  MOIZ Lora Y. Patel, S. Shah, G. [facility]  [phone]    Name: BREA BECKHAM  Age: 76y  Gender: Female  MRN: [id number]    Interval History:  Patient seen and examined at bedside.  No acute overnight events. Afebrile  Notes reviewed    Antibiotics:  cefTRIAXone   IVPB      cefTRIAXone   IVPB 1000 milliGRAM(s) IV Intermittent every 24 hours      Medications:  acetaminophen     Tablet .. 650 milliGRAM(s) Oral every 6 hours PRN  ALBUTerol    90 MICROgram(s) HFA Inhaler 2 Puff(s) Inhalation every 6 hours  aluminum hydroxide/magnesium hydroxide/simethicone Suspension 30 milliLiter(s) Oral every 4 hours PRN  cadexomer iodine 0.9% Gel 1 Application(s) Topical <User Schedule>  cefTRIAXone   IVPB      cefTRIAXone   IVPB 1000 milliGRAM(s) IV Intermittent every 24 hours  chlorhexidine 0.12% Liquid 15 milliLiter(s) Oral Mucosa every 12 hours  chlorhexidine 2% Cloths 1 Application(s) Topical two times a day  dextrose 5%. 1000 milliLiter(s) IV Continuous <Continuous>  dextrose 5%. 1000 milliLiter(s) IV Continuous <Continuous>  dextrose 50% Injectable 25 Gram(s) IV Push once  dextrose 50% Injectable 12.5 Gram(s) IV Push once  dextrose 50% Injectable 25 Gram(s) IV Push once  dextrose Oral Gel 15 Gram(s) Oral once PRN  glucagon  Injectable 1 milliGRAM(s) IntraMuscular once  heparin   Injectable 5000 Unit(s) SubCutaneous every 12 hours  insulin glargine Injectable (LANTUS) 5 Unit(s) SubCutaneous every morning  insulin lispro (ADMELOG) corrective regimen sliding scale   SubCutaneous every 6 hours  ipratropium 17 MICROgram(s) HFA Inhaler 1 Puff(s) Inhalation every 6 hours  LORazepam     Tablet 1 milliGRAM(s) Oral every 6 hours  LORazepam   Injectable 1 milliGRAM(s) IV Push every 4 hours PRN  melatonin 3 milliGRAM(s) Oral at bedtime PRN  midodrine 10 milliGRAM(s) Oral every 8 hours  ondansetron Injectable 4 milliGRAM(s) IV Push every 8 hours PRN  pantoprazole    Tablet 40 milliGRAM(s) Oral before breakfast  povidone iodine 10% Solution 1 Application(s) Topical daily  simethicone 80 milliGRAM(s) Chew every 12 hours      Review of Systems:  unable to obtain    Allergies: codeine (Hives)    For details regarding the patient's past medical history, social history, family history, and other miscellaneous elements, please refer the initial infectious diseases consultation and/or the admitting history and physical examination for this admission.    Objective:  Vitals:   T(C): 36.7 (10-28-22 @ 12:00), Max: 37.4 (10-27-22 @ 20:00)  HR: 72 (10-28-22 @ 12:00) (63 - 87)  BP: 112/61 (10-28-22 @ 12:00) (100/57 - 146/69)  RR: 27 (10-28-22 @ 12:00) (17 - 30)  SpO2: 100% (10-28-22 @ 12:00) (93% - 100%)    Physical Examination:  General: NAD  HEENT: NC/AT, EOMI,   Neck: trach to vent  Lungs: MV breath sounds  Heart: Regular rate and rhythm. No murmur, rub or gallop.  Abdomen: Soft. Nondistended. Nontender.  Skin: Warm. Dry.      Laboratory Studies:  CBC:                       7.6    8.11  )-----------( 158      ( 28 Oct 2022 06:15 )             26.4     CMP: 10-28    138  |  106  |  27<H>  ----------------------------<  188<H>  3.9   |  26  |  0.96    Ca    8.7      28 Oct 2022 06:15  Phos  4.2     10-28  Mg     1.6     10-28    TPro  6.8  /  Alb  1.5<L>  /  TBili  0.3  /  DBili  x   /  AST  12  /  ALT  15  /  AlkPhos  108  10-27    LIVER FUNCTIONS - ( 27 Oct 2022 06:00 )  Alb: 1.5 g/dL / Pro: 6.8 g/dL / ALK PHOS: 108 U/L / ALT: 15 U/L DA / AST: 12 U/L / GGT: x               Microbiology: reviewed    Culture - Sputum (collected 10-19-22 @ 00:58)  Source: .Sputum Sputum trap  Gram Stain (10-19-22 @ 19:24):    Few Squamous epithelial cells per low power field    Few polymorphonuclear leukocytes per low power field    Few Gram positive cocci in pairs per oil power field  Final Report (10-22-22 @ 17:55):    Moderate Mixed gram negative rods including    Moderate Stenotrophomonas maltophilia  Organism: Stenotrophomonas maltophilia (10-22-22 @ 17:56)      -  Minocycline: S [id number]      Method Type: KB  Organism: Stenotrophomonas maltophilia (10-22-22 @ 17:56)      -  Levofloxacin: S 2      -  Trimethoprim/Sulfamethoxazole: S <=0.5/9.5      Method Type: PRATIK  Organism: Stenotrophomonas maltophilia (10-22-22 @ 17:55)    Culture - Urine (collected 10-18-22 @ 12:27)  Source: Clean Catch Clean Catch (Midstream)  Final Report (10-21-22 @ 08:18):    >100,000 CFU/ml Escherichia coli  Organism: Escherichia coli (10-21-22 @ 08:18)  Organism: Escherichia coli (10-21-22 @ 08:18)      -  Amikacin: S <=16      -  Amoxicillin/Clavulanic Acid: S <=8/4      -  Ampicillin: R >16 These ampicillin results predict results for amoxicillin      -  Ampicillin/Sulbactam: I 16/8 Enterobacter, Klebsiella aerogenes, Citrobacter, and Serratia may develop resistance during prolonged therapy (3-4 days)      -  Aztreonam: S <=4      -  Cefazolin: S <=2 For uncomplicated UTI with K. pneumoniae, E. coli, or P. mirablis: PRATIK <=16 is sensitive and PRATIK >=32 is resistant. This also predicts results for oral agents cefaclor, cefdinir, cefpodoxime, cefprozil, cefuroxime axetil, cephalexin and locarbef for uncomplicated UTI. Note that some isolates may be susceptible to these agents while testing resistant to cefazolin.      -  Cefepime: S <=2      -  Cefoxitin: S <=8      -  Ceftriaxone: S <=1 Enterobacter, Klebsiella aerogenes, Citrobacter, and Serratia may develop resistance during prolonged therapy      -  Ciprofloxacin: R >2      -  Ertapenem: I 1      -  Gentamicin: R >8      -  Imipenem: S <=1      -  Levofloxacin: R >4      -  Meropenem: S <=1      -  Nitrofurantoin: S <=32 Should not be used to treat pyelonephritis      -  Piperacillin/Tazobactam: S <=8      -  Tigecycline: S <=2      -  Tobramycin: I 8      -  Trimethoprim/Sulfamethoxazole: R >2/38      Method Type: PRATIK    Culture - Blood (collected 10-18-22 @ 12:00)  Source: .Blood Blood-Peripheral  Final Report (10-23-22 @ 17:00):    No Growth Final    Culture - Blood (collected 10-18-22 @ 12:00)  Source: .Blood Blood-Peripheral  Final Report (10-23-22 @ 17:00):    No Growth Final          Radiology: reviewed

## 2022-10-28 NOTE — PROGRESS NOTE ADULT - ASSESSMENT
Pt is a 76W w/ PMHx of DM2, COPD, HTN, dementia, hx of subarachnoid hemorrhage, and history of chronic trach brought in by ambulance for evaluation of low CO2 and cyanotic appearance.   Well-known and has had multiple admissions for Acute on chronic RF and PNA w/ MDROs    Acute VAP/PNA  Acute on chronic HF  CAUTI/UTI  - pt p/w cyanosis  - most recent admission in end of August, most recent cx w/ Pseudomonas and Stenotrophomonas  - labs notable for leukocytosis to 13  - CT chest Groundglass opacities and interlobular septal thickening suggestive of CHF. There are also some more confluent areas of density   which may represent atelectasis versus infiltrates.Bilateral pleural effusions have decreased from the prior exam  - CT A/P w/o acute findings  - s/p levofloxacin in the ED  - UCx E.coli   - BCx NGTD  - sputum cx w/ Stenotrophomonas maltophilia  Plan:   S/p levofloxacin x7 day course, completed PNA tx  C/w ceftriaxone 1gm q24h for  UTI/CAUTI, plan for x10 day course with switch to PO cefpodoxime on discharge  Trend temps and cbc--leukocytosis resolved  Pulmonary toilet  Isolation per infection control policy given hx of CRE  Supportive care/vent management per [facility]    Infectious Diseases will continue to follow. Please call with any questions.   Andressa Brantley M.D.  [facility] Division of Infectious Diseases [phone]

## 2022-10-28 NOTE — PROGRESS NOTE ADULT - ASSESSMENT
75yo F with PMH of dementia, COPD with chronic resp failure and is vent dependent, s/p PEG, hx of cardiac arrest, diastolic CHF, cor pulmonale, hx of CVA, COVID-19 infxn, CKD, anemia, multiple admissions for respiratory failure (recent admissions in 06/2022 and 08/2022 diagnosed with PNA with parapneumonic pleural effusion s/p thoracentesis and Avycaz) who presents from [facility] for respiratory distress again a/w sepsis and acute on chronic hypoxic respiratory failure due to suspected recurrent VAP.    Acute on Chronic hypoxic RF  Steno PNA  UTI  RYAN    - Dementia with possible element of anoxic brain injury 2/2 cardiac arrest in past now chronic T/P  - Ativan standing and PRN for agitation  - Downtitrating Fi02 to maintain saturations > 92%  - Frequent suctioning and chest PT  - C/w Midodrine  - RYAN - resolved; likely hemodynamically mediated  - Maintain vaughn and strict I&Os  - Tolerating TF  - s/p Levaquin now on Ctx x 10D per ID  - c/w Lantus and CISS to maintain aggressive glycemic control < 180mg/dL  - Full code

## 2022-10-28 NOTE — PROGRESS NOTE ADULT - SUBJECTIVE AND OBJECTIVE BOX
BREA BECKHAM     [facility]    Allergies    codeine (Hives)    Intolerances        PAST MEDICAL & SURGICAL HISTORY:  Dementia of frontal lobe type      Aphasic stroke      Diabetes mellitus      Respiratory failure      Hypertension      GERD (gastroesophageal reflux disease)      Constipation      Respiratory failure      CVA (cerebral vascular accident)      HTN (hypertension)      DM (diabetes mellitus)      Advanced dementia      COVID-19 virus detected      Quadriplegia      Pneumonia      Type II diabetes mellitus      Hx of appendectomy      Gastrostomy in place      Tracheostomy in place      Tracheostomy tube present      Feeding by G-tube          FAMILY HISTORY:      Home Medications:  albuterol 90 mcg/inh inhalation aerosol with adapter: 2  inhaled every 6 hours (18 Oct 2022 11:42)  Bacid (LAC) oral tablet: 2 tab(s) by gastrostomy tube once a day (18 Oct 2022 11:42)  Betadine 10% topical swab: cleanse right and left great toes (18 Oct 2022 11:42)  chlorhexidine 0.12% mucous membrane liquid: 15 milliliter(s) mucous membrane 2 times a day (18 Oct 2022 11:42)  Eucerin topical cream: Apply topically to affected area once a day bilateral feet (18 Oct 2022 11:42)  insulin glargine 100 units/mL subcutaneous solution: 8 unit(s) subcutaneous once a day (in the morning) (18 Oct 2022 11:42)  ipratropium-albuterol 0.5 mg-2.5 mg/3 mL inhalation solution: 3 milliliter(s) inhaled 4 times a day (18 Oct 2022 11:42)  LORazepam 1 mg oral tablet: 1 tab(s) by gastrostomy tube every 4 hours (18 Oct 2022 11:42)  methocarbamol 500 mg oral tablet: 1 tab(s) by gastrostomy tube 2 times a day (18 Oct 2022 11:42)  MiraLax oral powder for reconstitution: g-tube (18 Oct 2022 13:04)  Multiple Vitamins oral tablet: 1 tab(s) orally once a day (18 Oct 2022 11:42)  nystatin 100,000 units/g topical powder: 1 application topically 3 times a day (18 Oct 2022 11:42)  omeprazole 20 mg oral delayed release capsule: orally 2 times a day (18 Oct 2022 11:42)  polyethylene glycol 3350 oral powder for reconstitution: 17 gram(s) by gastrostomy tube every 12 hours (18 Oct 2022 11:42)  senna 8.6 mg oral tablet: 3 tab(s) by gastrostomy tube once a day (at bedtime) (18 Oct 2022 11:42)  simethicone 80 mg oral tablet: g-tube (18 Oct 2022 13:04)  simethicone 80 mg oral tablet, chewable: 1 tab(s) by gastrostomy tube every 6 hours (18 Oct 2022 11:42)  sucralfate 1 g/10 mL oral suspension: 10 milliliter(s) g-tube 4 times a day (before meals and at bedtime) (18 Oct 2022 13:04)  Tylenol 325 mg oral tablet: 2 tab(s) by gastrostomy tube once a day; 60 minutes prior to dressing change  (18 Oct 2022 11:42)  Tylenol 325 mg oral tablet: 2 tab(s) by gastrostomy tube every 6 hours, As Needed (18 Oct 2022 11:42)      MEDICATIONS  (STANDING):  ALBUTerol    90 MICROgram(s) HFA Inhaler 2 Puff(s) Inhalation every 6 hours  cadexomer iodine 0.9% Gel 1 Application(s) Topical <User Schedule>  cefTRIAXone   IVPB      cefTRIAXone   IVPB 1000 milliGRAM(s) IV Intermittent every 24 hours  chlorhexidine 0.12% Liquid 15 milliLiter(s) Oral Mucosa every 12 hours  chlorhexidine 2% Cloths 1 Application(s) Topical two times a day  dextrose 5%. 1000 milliLiter(s) (100 mL/Hr) IV Continuous <Continuous>  dextrose 5%. 1000 milliLiter(s) (50 mL/Hr) IV Continuous <Continuous>  dextrose 50% Injectable 25 Gram(s) IV Push once  dextrose 50% Injectable 12.5 Gram(s) IV Push once  dextrose 50% Injectable 25 Gram(s) IV Push once  glucagon  Injectable 1 milliGRAM(s) IntraMuscular once  heparin   Injectable 5000 Unit(s) SubCutaneous every 12 hours  insulin glargine Injectable (LANTUS) 5 Unit(s) SubCutaneous every morning  insulin lispro (ADMELOG) corrective regimen sliding scale   SubCutaneous every 6 hours  ipratropium 17 MICROgram(s) HFA Inhaler 1 Puff(s) Inhalation every 6 hours  LORazepam     Tablet 1 milliGRAM(s) Oral every 6 hours  midodrine 10 milliGRAM(s) Oral every 8 hours  pantoprazole    Tablet 40 milliGRAM(s) Oral before breakfast  povidone iodine 10% Solution 1 Application(s) Topical daily  simethicone 80 milliGRAM(s) Chew every 12 hours    MEDICATIONS  (PRN):  acetaminophen     Tablet .. 650 milliGRAM(s) Oral every 6 hours PRN Temp greater or equal to 38C (100.4F), Mild Pain (1 - 3)  aluminum hydroxide/magnesium hydroxide/simethicone Suspension 30 milliLiter(s) Oral every 4 hours PRN Dyspepsia  dextrose Oral Gel 15 Gram(s) Oral once PRN Blood Glucose LESS THAN 70 milliGRAM(s)/deciliter  LORazepam   Injectable 1 milliGRAM(s) IV Push every 4 hours PRN Agitation  melatonin 3 milliGRAM(s) Oral at bedtime PRN Insomnia  ondansetron Injectable 4 milliGRAM(s) IV Push every 8 hours PRN Nausea and/or Vomiting      Diet, NPO with Tube Feed:   Tube Feeding Modality: Gastrostomy  Nepro with Carb Steady  Total Volume for 24 Hours (mL): 800  Continuous  Starting Tube Feed Rate mL per Hour: 40  Until Goal Tube Feed Rate (mL per Hour): 40  Tube Feed Duration (in Hours): 20  Tube Feed Start Time: 11:00  Free Water Flush  Pump   Rate (mL per Hour): 35   Frequency: Every Hour    Duration (Hours): 24    Start Time: 11:00  Lucas(7 Gm Arginine/7 Gm Glut/1.2 Gm HMB     Qty per Day:  1 (10-25-22 @ 10:53) [Active]          Vital Signs Last 24 Hrs  T(C): 37.1 (28 Oct 2022 04:00), Max: 37.7 (27 Oct 2022 12:44)  T(F): 98.8 (28 Oct 2022 04:00), Max: 99.8 (27 Oct 2022 12:44)  HR: 68 (28 Oct 2022 07:12) (61 - 125)  BP: 124/76 (28 Oct 2022 06:00) (100/57 - 173/78)  BP(mean): 87 (28 Oct 2022 06:00) (71 - 106)  RR: 27 (28 Oct 2022 06:00) (16 - 33)  SpO2: 100% (28 Oct 2022 07:12) (93% - 100%)    Parameters below as of 28 Oct 2022 07:12  Patient On (Oxygen Delivery Method): ventilator,50          10-27-22 @ 07:01  -  10-28-22 @ 07:00  --------------------------------------------------------  IN: 1380 mL / OUT: 800 mL / NET: 580 mL        Mode: AC/ CMV (Assist Control/ Continuous Mandatory Ventilation), RR (machine): 18, TV (machine): 400, FiO2: 50, PEEP: 5, ITime: 1, MAP: 17, PIP: 36      LABS:                        7.6    8.11  )-----------( 158      ( 28 Oct 2022 06:15 )             26.4     10-28    138  |  106  |  27<H>  ----------------------------<  188<H>  3.9   |  26  |  0.96    Ca    8.7      28 Oct 2022 06:15  Phos  4.2     10-28  Mg     1.6     10-28    TPro  6.8  /  Alb  1.5<L>  /  TBili  0.3  /  DBili  x   /  AST  12  /  ALT  15  /  AlkPhos  108  10-27              WBC:  WBC Count: 8.11 K/uL (10-28 @ 06:15)  WBC Count: 10.05 K/uL (10-27 @ 06:00)  WBC Count: 11.65 K/uL (10-26 @ 06:23)  WBC Count: 15.63 K/uL (10-25 @ 06:37)      MICROBIOLOGY:  RECENT CULTURES:                  Sodium:  Sodium, Serum: 138 mmol/L (10-28 @ 06:15)  Sodium, Serum: 141 mmol/L (10-27 @ 06:00)  Sodium, Serum: 143 mmol/L (10-26 @ 06:23)  Sodium, Serum: 146 mmol/L (10-25 @ 06:37)      0.96 mg/dL 10-28 @ 06:15  0.97 mg/dL 10-27 @ 06:00  0.94 mg/dL 10-26 @ 06:23  1.01 mg/dL 10-25 @ 06:37      Hemoglobin:  Hemoglobin: 7.6 g/dL (10-28 @ 06:15)  Hemoglobin: 8.0 g/dL (10-27 @ 06:00)  Hemoglobin: 7.7 g/dL (10-26 @ 06:23)  Hemoglobin: 8.6 g/dL (10-25 @ 06:37)      Platelets: Platelet Count - Automated: 158 K/uL (10-28 @ 06:15)  Platelet Count - Automated: 178 K/uL (10-27 @ 06:00)  Platelet Count - Automated: 162 K/uL (10-26 @ 06:23)  Platelet Count - Automated: 210 K/uL (10-25 @ 06:37)      LIVER FUNCTIONS - ( 27 Oct 2022 06:00 )  Alb: 1.5 g/dL / Pro: 6.8 g/dL / ALK PHOS: 108 U/L / ALT: 15 U/L DA / AST: 12 U/L / GGT: x                 RADIOLOGY & ADDITIONAL STUDIES:      MICROBIOLOGY:  RECENT CULTURES:

## 2022-10-28 NOTE — PROGRESS NOTE ADULT - ASSESSMENT
76F with dementia, COPD with chronic resp failure and is vent dependent, s/p PEG, hx of cardiac arrest, CHF, cor pulmonale, CVA, COVID-19 infxn, CKD, anemia, multiple admissions    dementia  COPD  chr resp failure  vegetative state  dysphagia  peg  hx of card arrest - anoxic brain  CHF  cva    Fio2 titration in progress for preparation to DC back to SNF   on emp ABX  CCM notes reviewed  vs noted  labs reviewed  Wound care in progress -   GI consult noted    Diagnosis; Prognosis; MOLST Discussed  Marciano -   HCP  pt is full code  spoke with  -

## 2022-10-28 NOTE — PROGRESS NOTE ADULT - ASSESSMENT
REVIEW OF SYMPTOMS      Able to give (reliable) ROS  NO     PHYSICAL EXAM    HEENT Unremarkable  atraumatic   RESP Fair air entry EXP prolonged    Harsh breath sound Resp distres mild   CARDIAC S1 S2 No S3     NO JVD    ABDOMEN SOFT BS PRESENT NOT DISTENDED No hepatosplenomegaly   PEDAL EDEMA present No calf tenderness  NO rash     GENERAL DATA .   GOC.  10/18/2022 full code       ALLGY. codeine                            WT.          10/18/2022 48  BMI.          10/18/2022 17                     ICU STAY. 10/18/2022  COVID.  10/18/2022 scv2 (-)     BEST PRACTICE ISSUES.    HOB ELEVATN. Yes  DVT PPLX.   10/18/2022 hpsc    SQUIRES PPLX.  10/18/2022 protonix 40     INFN PPLX.    10/18/2022 ch;lorhexidine .12%  10/19/2022 chlorhexidine 2%    SP SW EM.        DIET.     10/19/2022 nepro 800 gt    VS/ PO/IO/ VENT/ DRIPS.  10/28/2022 70 130/70   10/28/2022 ac 18/400/5/.4     ASSESSMENT/RECOMMENDATIONS .   RESP.  -- Gas exchange.   10/18/2022 ac 18/400/100/5 751/37/257  -- VENT MANAGEMENT.   HOB elevation  Target Pplat 30 (-)  Target PO 90-95%  Target pH 730 (+)  Daily spontaneous breathing trials   Daily sedation vacation   -- Pleuralk effsn  Past ritchie   R THORAC   6/8/2022  g 161 l 222 p 4.9 p 10 l 16 .38    L THORAC   5/25/2022 g 183 l 144/381 .37 p 3.4/5.3 .64 p 23 l 36   5/25/2022l pl fluid  lymph pred exudate   cyto (-)     Ct  10/18/2022 sm r mod l effs large subpulmonic component   a/r No thoracentesis plannd at this point risk prohibitive in vent pt   -- Mediastinal lne.  Ct  10/18/2022 again enlarged mediastinal lns likely reactive   a/r will need followup with ct in 2m and consider biopsy if persists   -- wheeze.  10/18 albuterol hf  10/18 atrovent hf   INFECTION.  PAST ID ISSUES   8/19/2022  zosyn Se Vignesh  8/19/2022 bactrim ds 2 bid   pmh 5/2/2022 SERRATIA CARBAPENEM RESISTANT PSEUDOMONAS  -- VAP 10/18/2022.  -- UTI 10/18/2022   w 10/18-10/19-10/20-10/21-10/23-10/25-10/26-10/27-10/28/2022 w 13 - 8.8 - 15-10.3 - 9.4- 12.3 - 15.6- 11.6  - 10 - 8   pr 10/20-10/27/2022 6.6  - .4   ua 10/18/2022 w 11-25   ct ch 10/18/2022 cw 8/18/20232 ggo post upper lobes with interlobular septal thickening infiltrate or atelectasis lower lobes l more than   sm r and sm to mod l effsn  rvp 10/18/2022 (-)   uc 10/18 100K E coli   sp 10/19 Stenotrophomonas   bc 10/18 (-)   10/18/2022 meropenem Dr NEWTON  dc  10/18/2022 ID Dr Brantley on case   10/19/2022 levaquin 750 x 7 d   10/21/2022 rocephin x 10d Dr BRITTANY Brantley      CARDIAC.  -- Hypotension.  10/18/2022 88/48   echo 8/19/2022 n lvsf dd1 pasp 58   10/20 midocrine 10.3   Target MAP 65 (+)   resolvd   GI.  -- ELEVATED LFTS.  LFTS 10/18-10/19-10/20/2022   ap 140-131- 104  ast - 44  alt 21 - 103 - 64   monitor  HEMAT.  -- Anemia.  Hb 10/18-10/19-10/20-10/21-10/22-10/23-10/24-10/25-10/26-10/27-10/28/2022 Hb 7.4 -  6.3 - 8.2  - 7.4 - 8 - 8.7 - 8.4 - 8.8 - 7.7 - 8 - 7.8   inr 10/18/2022 inr 1.23   ctap 10/20 No rp bl  Monitor  target Hb 7 (+)   -- TRANSFUSION  10/19/2022 2 U PRBC     OVERALL ASSESSMENT/DISPOSITION.  76 f PMH trach peg cva cac anoxic encephalo PH 8/19/2022 pasp 58 bl pl effs  r thora 6/8/2022 and l thora 5/25/2022 were lp exudates  recurrent hospitalizations HO CR psuedomonas 5/2/2022 zosyn 8/19/2022 admitted 10/18/2022 with resp distress     VAP   UTI   uc 10/18 100K E coli   sp 10/19 Stenotrophomonas   10/19/2022 levaquin 750   10/21/2022 rocephin x 7d Dr BRITTANY Brantley   Vent mgmt   Hypotension  10/20 midocrine 10.3  Anemia  10/19/2022 2 U PRBC   Woody 10/23/2022      TIME SPENT   Over 39 minutes aggregate critical care time spent on encounter; activities included   direct patient care, counseling and/or coordinating care reviewing notes, lab data/ imaging , discussion with multidisciplinary team/ patient  /family and explaining in detail risks, benefits, alternatives  of the recommendations     CHAPINCITO GUIDRY 76 f [facility] S 10/18/2022   DR ANG PADGETT

## 2022-10-28 NOTE — PROGRESS NOTE ADULT - SUBJECTIVE AND OBJECTIVE BOX
INTERVAL HPI/OVERNIGHT EVENTS:    MEDICATIONS  (STANDING):  ALBUTerol    90 MICROgram(s) HFA Inhaler 2 Puff(s) Inhalation every 6 hours  cadexomer iodine 0.9% Gel 1 Application(s) Topical <User Schedule>  cefTRIAXone   IVPB      cefTRIAXone   IVPB 1000 milliGRAM(s) IV Intermittent every 24 hours  chlorhexidine 0.12% Liquid 15 milliLiter(s) Oral Mucosa every 12 hours  chlorhexidine 2% Cloths 1 Application(s) Topical two times a day  dextrose 5%. 1000 milliLiter(s) (100 mL/Hr) IV Continuous <Continuous>  dextrose 5%. 1000 milliLiter(s) (50 mL/Hr) IV Continuous <Continuous>  dextrose 50% Injectable 25 Gram(s) IV Push once  dextrose 50% Injectable 12.5 Gram(s) IV Push once  dextrose 50% Injectable 25 Gram(s) IV Push once  glucagon  Injectable 1 milliGRAM(s) IntraMuscular once  heparin   Injectable 5000 Unit(s) SubCutaneous every 12 hours  insulin glargine Injectable (LANTUS) 5 Unit(s) SubCutaneous every morning  insulin lispro (ADMELOG) corrective regimen sliding scale   SubCutaneous every 6 hours  ipratropium 17 MICROgram(s) HFA Inhaler 1 Puff(s) Inhalation every 6 hours  LORazepam     Tablet 1 milliGRAM(s) Oral every 6 hours  midodrine 10 milliGRAM(s) Oral every 8 hours  pantoprazole    Tablet 40 milliGRAM(s) Oral before breakfast  povidone iodine 10% Solution 1 Application(s) Topical daily  simethicone 80 milliGRAM(s) Chew every 12 hours    MEDICATIONS  (PRN):  acetaminophen     Tablet .. 650 milliGRAM(s) Oral every 6 hours PRN Temp greater or equal to 38C (100.4F), Mild Pain (1 - 3)  aluminum hydroxide/magnesium hydroxide/simethicone Suspension 30 milliLiter(s) Oral every 4 hours PRN Dyspepsia  dextrose Oral Gel 15 Gram(s) Oral once PRN Blood Glucose LESS THAN 70 milliGRAM(s)/deciliter  LORazepam   Injectable 1 milliGRAM(s) IV Push every 4 hours PRN Agitation  melatonin 3 milliGRAM(s) Oral at bedtime PRN Insomnia  ondansetron Injectable 4 milliGRAM(s) IV Push every 8 hours PRN Nausea and/or Vomiting      Allergies    codeine (Hives)    Intolerances        Review of Systems:    General:  No wt loss, fevers, chills, night sweats,fatigue,   Eyes:  Good vision, no reported pain  ENT:  No sore throat, pain, runny nose, dysphagia  CV:  No pain, palpitatioins, hypo/hypertension  Resp:  No dyspnea, cough, tachypnea, wheezing  GI:  No pain, No nausea, No vomiting, No diarrhea, No constipatiion, No weight loss, No fever, No pruritis, No rectal bleeding, No tarry stools, No dysphagia,  :  No pain, bleeding, incontinence, nocturia  Muscle:  No pain, weakness  Neuro:  No weakness, tingling, memory problems  Psych:  No fatigue, insomnia, mood problems, depression  Endocrine:  No polyuria, polydypsia, cold/heat intolerance  Heme:  No petechiae, ecchymosis, easy bruisability  Skin:  No rash, tattoos, scars, edema      Vital Signs Last 24 Hrs  T(C): 36.7 (28 Oct 2022 16:00), Max: 37.4 (27 Oct 2022 20:00)  T(F): 98 (28 Oct 2022 16:00), Max: 99.3 (27 Oct 2022 20:00)  HR: 79 (28 Oct 2022 16:00) (63 - 79)  BP: 133/91 (28 Oct 2022 16:00) (106/60 - 135/70)  BP(mean): 103 (28 Oct 2022 16:00) (76 - 104)  RR: 38 (28 Oct 2022 16:00) (17 - 38)  SpO2: 92% (28 Oct 2022 16:00) (92% - 100%)    Parameters below as of 28 Oct 2022 16:00  Patient On (Oxygen Delivery Method): ventilator    O2 Concentration (%): 40    PHYSICAL EXAM:    Constitutional: NAD, well-developed  HEENT: EOMI, throat clear  Neck: No LAD, supple  Respiratory: CTA and P  Cardiovascular: S1 and S2, RRR, no M  Gastrointestinal: BS+, soft, NT/ND, neg HSM,  Extremities: No peripheral edema, neg clubing, cyanosis  Vascular: 2+ peripheral pulses  Neurological: A/O x 3, no focal deficits  Psychiatric: Normal mood, normal affect  Skin: No rashes      LABS:                        7.6    8.11  )-----------( 158      ( 28 Oct 2022 06:15 )             26.4     10-28    138  |  106  |  27<H>  ----------------------------<  188<H>  3.9   |  26  |  0.96    Ca    8.7      28 Oct 2022 06:15  Phos  4.2     10-28  Mg     1.6     10-28    TPro  6.8  /  Alb  1.5<L>  /  TBili  0.3  /  DBili  x   /  AST  12  /  ALT  15  /  AlkPhos  108  10-27          RADIOLOGY & ADDITIONAL TESTS:

## 2022-10-28 NOTE — PROGRESS NOTE ADULT - SUBJECTIVE AND OBJECTIVE BOX
Patient is a 76y old  Female who presents with a chief complaint of Acute on chronic hypoxemic respiratory failure.       INTERVAL HPI/OVERNIGHT EVENTS: Pt is nonverbal and cannot provide ROS. No significant acute events overnight.     MEDICATIONS  (STANDING):  ALBUTerol    90 MICROgram(s) HFA Inhaler 2 Puff(s) Inhalation every 6 hours  cadexomer iodine 0.9% Gel 1 Application(s) Topical <User Schedule>  cefTRIAXone   IVPB      cefTRIAXone   IVPB 1000 milliGRAM(s) IV Intermittent every 24 hours  chlorhexidine 0.12% Liquid 15 milliLiter(s) Oral Mucosa every 12 hours  chlorhexidine 2% Cloths 1 Application(s) Topical two times a day  dextrose 5%. 1000 milliLiter(s) (50 mL/Hr) IV Continuous <Continuous>  dextrose 5%. 1000 milliLiter(s) (100 mL/Hr) IV Continuous <Continuous>  dextrose 50% Injectable 25 Gram(s) IV Push once  dextrose 50% Injectable 12.5 Gram(s) IV Push once  dextrose 50% Injectable 25 Gram(s) IV Push once  glucagon  Injectable 1 milliGRAM(s) IntraMuscular once  heparin   Injectable 5000 Unit(s) SubCutaneous every 12 hours  insulin glargine Injectable (LANTUS) 5 Unit(s) SubCutaneous every morning  insulin lispro (ADMELOG) corrective regimen sliding scale   SubCutaneous every 6 hours  ipratropium 17 MICROgram(s) HFA Inhaler 1 Puff(s) Inhalation every 6 hours  LORazepam     Tablet 1 milliGRAM(s) Oral every 6 hours  midodrine 10 milliGRAM(s) Oral every 8 hours  pantoprazole    Tablet 40 milliGRAM(s) Oral before breakfast  povidone iodine 10% Solution 1 Application(s) Topical daily  simethicone 80 milliGRAM(s) Chew every 12 hours    MEDICATIONS  (PRN):  acetaminophen     Tablet .. 650 milliGRAM(s) Oral every 6 hours PRN Temp greater or equal to 38C (100.4F), Mild Pain (1 - 3)  aluminum hydroxide/magnesium hydroxide/simethicone Suspension 30 milliLiter(s) Oral every 4 hours PRN Dyspepsia  dextrose Oral Gel 15 Gram(s) Oral once PRN Blood Glucose LESS THAN 70 milliGRAM(s)/deciliter  LORazepam   Injectable 1 milliGRAM(s) IV Push every 4 hours PRN Agitation  melatonin 3 milliGRAM(s) Oral at bedtime PRN Insomnia  ondansetron Injectable 4 milliGRAM(s) IV Push every 8 hours PRN Nausea and/or Vomiting        Allergies    codeine (Hives)    Intolerances        REVIEW OF SYSTEMS:  Pt is nonverbal and cannot provide ROS.    ICU Vital Signs Last 24 Hrs  T(C): 36.7 (28 Oct 2022 18:00), Max: 37.4 (27 Oct 2022 20:00)  T(F): 98 (28 Oct 2022 18:00), Max: 99.3 (27 Oct 2022 20:00)  HR: 71 (28 Oct 2022 18:00) (63 - 79)  BP: 113/66 (28 Oct 2022 18:00) (106/60 - 133/91)  BP(mean): 82 (28 Oct 2022 18:00) (76 - 104)  ABP: --  ABP(mean): --  RR: 29 (28 Oct 2022 18:00) (17 - 38)  SpO2: 100% (28 Oct 2022 18:00) (92% - 100%)    O2 Parameters below as of 28 Oct 2022 18:00  Patient On (Oxygen Delivery Method): ventilator      PHYSICAL EXAM:  GENERAL: chronically ill-appearing, contracted  HEENT:  anicteric, dry mucous membranes, mouth agape at baseline ; trach with vent  CHEST/LUNG:  decreased breath sounds in lower lung fields  HEART:  RRR, S1, S2  ABDOMEN:  +BS, soft, no apparent tenderness, distended, PEG in place  EXTREMITIES: limb contractures; no cyanosis or apparent calf tenderness  NERVOUS SYSTEM: cannot answer questions and does not follow commands at baseline    LABS:                                              7.6    8.11  )-----------( 158      ( 28 Oct 2022 06:15 )             26.4     CBC Full  -  ( 28 Oct 2022 06:15 )  WBC Count : 8.11 K/uL  Hemoglobin : 7.6 g/dL  Hematocrit : 26.4 %  Platelet Count - Automated : 158 K/uL  Mean Cell Volume : 93.0 fl  Mean Cell Hemoglobin : 26.8 pg  Mean Cell Hemoglobin Concentration : 28.8 gm/dL  Auto Neutrophil # : x  Auto Lymphocyte # : x  Auto Monocyte # : x  Auto Eosinophil # : x  Auto Basophil # : x  Auto Neutrophil % : x  Auto Lymphocyte % : x  Auto Monocyte % : x  Auto Eosinophil % : x  Auto Basophil % : x    10-28    138  |  106  |  27<H>  ----------------------------<  188<H>  3.9   |  26  |  0.96    Ca    8.7      28 Oct 2022 06:15  Phos  4.2     10-28  Mg     1.6     10-28    TPro  6.8  /  Alb  1.5<L>  /  TBili  0.3  /  DBili  x   /  AST  12  /  ALT  15  /  AlkPhos  108  10-27      CAPILLARY BLOOD GLUCOSE      POCT Blood Glucose.: 200 mg/dL (28 Oct 2022 17:27)  POCT Blood Glucose.: 211 mg/dL (28 Oct 2022 11:54)  POCT Blood Glucose.: 176 mg/dL (28 Oct 2022 08:01)  POCT Blood Glucose.: 185 mg/dL (28 Oct 2022 05:56)          RADIOLOGY & ADDITIONAL TESTS:    Personally reviewed.     Consultant(s) Notes Reviewed:  [x] YES  [ ] NO     Patient is a 76y old  Female who presents with a chief complaint of Acute on chronic hypoxemic respiratory failure.        INTERVAL HPI/OVERNIGHT EVENTS: Pt is nonverbal and cannot provide ROS. No significant acute events overnight.     MEDICATIONS  (STANDING):  ALBUTerol    90 MICROgram(s) HFA Inhaler 2 Puff(s) Inhalation every 6 hours  cadexomer iodine 0.9% Gel 1 Application(s) Topical <User Schedule>  cefTRIAXone   IVPB      cefTRIAXone   IVPB 1000 milliGRAM(s) IV Intermittent every 24 hours  chlorhexidine 0.12% Liquid 15 milliLiter(s) Oral Mucosa every 12 hours  chlorhexidine 2% Cloths 1 Application(s) Topical two times a day  dextrose 5%. 1000 milliLiter(s) (50 mL/Hr) IV Continuous <Continuous>  dextrose 5%. 1000 milliLiter(s) (100 mL/Hr) IV Continuous <Continuous>  dextrose 50% Injectable 25 Gram(s) IV Push once  dextrose 50% Injectable 12.5 Gram(s) IV Push once  dextrose 50% Injectable 25 Gram(s) IV Push once  glucagon  Injectable 1 milliGRAM(s) IntraMuscular once  heparin   Injectable 5000 Unit(s) SubCutaneous every 12 hours  insulin glargine Injectable (LANTUS) 5 Unit(s) SubCutaneous every morning  insulin lispro (ADMELOG) corrective regimen sliding scale   SubCutaneous every 6 hours  ipratropium 17 MICROgram(s) HFA Inhaler 1 Puff(s) Inhalation every 6 hours  LORazepam     Tablet 1 milliGRAM(s) Oral every 6 hours  midodrine 10 milliGRAM(s) Oral every 8 hours  pantoprazole    Tablet 40 milliGRAM(s) Oral before breakfast  povidone iodine 10% Solution 1 Application(s) Topical daily  simethicone 80 milliGRAM(s) Chew every 12 hours    MEDICATIONS  (PRN):  acetaminophen     Tablet .. 650 milliGRAM(s) Oral every 6 hours PRN Temp greater or equal to 38C (100.4F), Mild Pain (1 - 3)  aluminum hydroxide/magnesium hydroxide/simethicone Suspension 30 milliLiter(s) Oral every 4 hours PRN Dyspepsia  dextrose Oral Gel 15 Gram(s) Oral once PRN Blood Glucose LESS THAN 70 milliGRAM(s)/deciliter  LORazepam   Injectable 1 milliGRAM(s) IV Push every 4 hours PRN Agitation  melatonin 3 milliGRAM(s) Oral at bedtime PRN Insomnia  ondansetron Injectable 4 milliGRAM(s) IV Push every 8 hours PRN Nausea and/or Vomiting        Allergies    codeine (Hives)    Intolerances        REVIEW OF SYSTEMS:  Pt is nonverbal and cannot provide ROS.    ICU Vital Signs Last 24 Hrs  T(C): 36.7 (28 Oct 2022 18:00), Max: 37.4 (27 Oct 2022 20:00)  T(F): 98 (28 Oct 2022 18:00), Max: 99.3 (27 Oct 2022 20:00)  HR: 71 (28 Oct 2022 18:00) (63 - 79)  BP: 113/66 (28 Oct 2022 18:00) (106/60 - 133/91)  BP(mean): 82 (28 Oct 2022 18:00) (76 - 104)  ABP: --  ABP(mean): --  RR: 29 (28 Oct 2022 18:00) (17 - 38)  SpO2: 100% (28 Oct 2022 18:00) (92% - 100%)    O2 Parameters below as of 28 Oct 2022 18:00  Patient On (Oxygen Delivery Method): ventilator      PHYSICAL EXAM:  GENERAL: chronically ill-appearing, contracted  HEENT:  anicteric, dry mucous membranes, mouth agape at baseline ; trach with vent  CHEST/LUNG:  decreased breath sounds in lower lung fields  HEART:  RRR, S1, S2  ABDOMEN:  +BS, soft, no apparent tenderness, distended, PEG in place  EXTREMITIES: limb contractures; no cyanosis or apparent calf tenderness  NERVOUS SYSTEM: cannot answer questions and does not follow commands at baseline    LABS:                                              7.6    8.11  )-----------( 158      ( 28 Oct 2022 06:15 )             26.4     CBC Full  -  ( 28 Oct 2022 06:15 )  WBC Count : 8.11 K/uL  Hemoglobin : 7.6 g/dL  Hematocrit : 26.4 %  Platelet Count - Automated : 158 K/uL  Mean Cell Volume : 93.0 fl  Mean Cell Hemoglobin : 26.8 pg  Mean Cell Hemoglobin Concentration : 28.8 gm/dL  Auto Neutrophil # : x  Auto Lymphocyte # : x  Auto Monocyte # : x  Auto Eosinophil # : x  Auto Basophil # : x  Auto Neutrophil % : x  Auto Lymphocyte % : x  Auto Monocyte % : x  Auto Eosinophil % : x  Auto Basophil % : x    10-28    138  |  106  |  27<H>  ----------------------------<  188<H>  3.9   |  26  |  0.96    Ca    8.7      28 Oct 2022 06:15  Phos  4.2     10-28  Mg     1.6     10-28    TPro  6.8  /  Alb  1.5<L>  /  TBili  0.3  /  DBili  x   /  AST  12  /  ALT  15  /  AlkPhos  108  10-27      CAPILLARY BLOOD GLUCOSE      POCT Blood Glucose.: 200 mg/dL (28 Oct 2022 17:27)  POCT Blood Glucose.: 211 mg/dL (28 Oct 2022 11:54)  POCT Blood Glucose.: 176 mg/dL (28 Oct 2022 08:01)  POCT Blood Glucose.: 185 mg/dL (28 Oct 2022 05:56)          RADIOLOGY & ADDITIONAL TESTS:    Personally reviewed.     Consultant(s) Notes Reviewed:  [x] YES  [ ] NO

## 2022-10-28 NOTE — PROGRESS NOTE ADULT - ASSESSMENT
77yo F with PMH of dementia, COPD with chronic resp failure and is vent dependent, s/p PEG, hx of cardiac arrest, diastolic CHF, cor pulmonale, hx of CVA, COVID-19 infxn, CKD, anemia, multiple admissions for respiratory failure (recent admissions in 06/2022 and 08/2022 diagnosed with PNA with parapneumonic pleural effusion s/p thoracentesis and Avycaz) who presents from [facility] for respiratory distress again a/w sepsis and acute on chronic hypoxic respiratory failure due to suspected recurrent VAP.    Severe sepsis and acute hypoxic respiratory failure 2/2 gram-negative PNA   - titrating down FiO2 on vent as able - has had some success titrating down FiO2 today to 40% -- continue to taper down as able  - had 7-day course of levaquin per ID for PNA (Stenotrophomonas on culture) ; and will c/w just rocephin now for potential CAUTI (10-day course to be completed with dose on 10/30)  - ID ([facility]), recs appreciated   - procalcitonin high but improving 23 ->6.6 within a couple of days of starting Abx and now down to 0.47 on 10/27/22  - cautious use of fluids given hx of CHF and severe hypoalbuminemia (received 1750cc of NS in ED)  - blood cultures (NGTD), urine culture growing E coli sensitive to rocephin  - trach sputum culture - grew Stenotrophomonas sensitive to levaquin  - Checked CT Abd/P to eval for any other potential source sign of infection and found no significant sign of intra-abdominal infection on CT  - c/w midodrine with hold parameters -- consider titrating dose if BP elevated  - cc/pulm consult (Jaime), recs appreciated  - palliative care (Emre), recs appreciated - pt is full code  - leukocytosis resolving    Diastolic CHF  Hx of Pleural effusions  - last TTE was 5/30 with EF 65% with normal LVSF, dilated RV, and moderate pHTN -> repeat TTE appears unchanged  - pt also with severe hypoalbuminemia   - may need repeat thoracentesis (had 1.5L drained few admissions ago), pulmonology consulted; pl effusion was parapneumonic on past admission as opposed to transudative and thus less likely related to CHF    RYAN on CKD 3  - likely in part ATN due to sepsis and in part prerenal   - renal function recovered to baseline CKD 3  - renally dose medications and monitor BMP and electrolytes   - given pt has low muscle mass from being bedbound for years, this GFR estimate is likely falsely high  - monitor BMP  - hypokalemia repleted with KCl    Anemia of chronic disease  - has been evaluated by GI and hematology in the past -> dx with anemia of chronic disease with intermittent GI bleeding  - s/p 2 unit PRBC   - current Hgb generally stable ~ 7.5-8; monitor for possible need for another PRBC  - no jacqueline GI bleed per nursing     hx of HTN  - not on any anti-hypertensive meds at facility  - monitor VS    DM2  - NPO with TF  - c/w moderate dose lispro coverage sliding scale q6h  - goal -180 - mildly above goal - started lantus 5un sq QAM     Diet  - TF was on hold 2/2 leak around PEG site, GI readjusted PEG and not leaking currently  - GI (Leia/ group), recs appreciated     Preventive measures  - cont with heparin subq for DVT ppx  - Full Code

## 2022-10-28 NOTE — PROGRESS NOTE ADULT - ASSESSMENT
The patient is a 76 year old female with a history of HTN, DM, CVA, dementia, chronic respiratory failure s/p trach, PEG, cardiac arrest, anemia, pleural effusions who presents with hypoxia.     Plan:  - ECG with no evidence of ischemia or infarction  - Echo 8/22 with normal LV systolic function, mod pulm HTN, IVC small/collapsible  - CT chest with small bilateral pleural effusions, GGO, and infiltrates  - Pleural effusions previously were exudative, likely parapneumonic  - Continue midodrine 10 mg tid  - IV antibiotics  - Monitor hemoglobin

## 2022-10-28 NOTE — PROGRESS NOTE ADULT - SUBJECTIVE AND OBJECTIVE BOX
Date/Time Patient Seen:  		  Referring MD:   Data Reviewed	       Patient is a 76y old  Female who presents with a chief complaint of Acute on Hypoxemic respiratory failure (27 Oct 2022 19:48)      Subjective/HPI     PAST MEDICAL & SURGICAL HISTORY:  Dementia of frontal lobe type    Aphasic stroke    Diabetes mellitus    Respiratory failure    Hypertension    GERD (gastroesophageal reflux disease)    Constipation    Respiratory failure    CVA (cerebral vascular accident)    HTN (hypertension)    DM (diabetes mellitus)    Advanced dementia    COVID-19 virus detected    Quadriplegia    Pneumonia    Type II diabetes mellitus    Hx of appendectomy    Gastrostomy in place    Tracheostomy in place    Tracheostomy tube present    Feeding by G-tube          Medication list         MEDICATIONS  (STANDING):  ALBUTerol    90 MICROgram(s) HFA Inhaler 2 Puff(s) Inhalation every 6 hours  cadexomer iodine 0.9% Gel 1 Application(s) Topical <User Schedule>  cefTRIAXone   IVPB      cefTRIAXone   IVPB 1000 milliGRAM(s) IV Intermittent every 24 hours  chlorhexidine 0.12% Liquid 15 milliLiter(s) Oral Mucosa every 12 hours  chlorhexidine 2% Cloths 1 Application(s) Topical two times a day  dextrose 5%. 1000 milliLiter(s) (100 mL/Hr) IV Continuous <Continuous>  dextrose 5%. 1000 milliLiter(s) (50 mL/Hr) IV Continuous <Continuous>  dextrose 50% Injectable 25 Gram(s) IV Push once  dextrose 50% Injectable 12.5 Gram(s) IV Push once  dextrose 50% Injectable 25 Gram(s) IV Push once  glucagon  Injectable 1 milliGRAM(s) IntraMuscular once  heparin   Injectable 5000 Unit(s) SubCutaneous every 12 hours  insulin glargine Injectable (LANTUS) 5 Unit(s) SubCutaneous every morning  insulin lispro (ADMELOG) corrective regimen sliding scale   SubCutaneous every 6 hours  ipratropium 17 MICROgram(s) HFA Inhaler 1 Puff(s) Inhalation every 6 hours  LORazepam     Tablet 1 milliGRAM(s) Oral every 6 hours  midodrine 10 milliGRAM(s) Oral every 8 hours  pantoprazole    Tablet 40 milliGRAM(s) Oral before breakfast  povidone iodine 10% Solution 1 Application(s) Topical daily  simethicone 80 milliGRAM(s) Chew every 12 hours    MEDICATIONS  (PRN):  acetaminophen     Tablet .. 650 milliGRAM(s) Oral every 6 hours PRN Temp greater or equal to 38C (100.4F), Mild Pain (1 - 3)  aluminum hydroxide/magnesium hydroxide/simethicone Suspension 30 milliLiter(s) Oral every 4 hours PRN Dyspepsia  dextrose Oral Gel 15 Gram(s) Oral once PRN Blood Glucose LESS THAN 70 milliGRAM(s)/deciliter  LORazepam   Injectable 1 milliGRAM(s) IV Push every 4 hours PRN Agitation  melatonin 3 milliGRAM(s) Oral at bedtime PRN Insomnia  ondansetron Injectable 4 milliGRAM(s) IV Push every 8 hours PRN Nausea and/or Vomiting         Vitals log        ICU Vital Signs Last 24 Hrs  T(C): 37.1 (28 Oct 2022 04:00), Max: 37.7 (27 Oct 2022 12:44)  T(F): 98.8 (28 Oct 2022 04:00), Max: 99.8 (27 Oct 2022 12:44)  HR: 75 (28 Oct 2022 06:00) (61 - 125)  BP: 124/76 (28 Oct 2022 06:00) (100/57 - 173/78)  BP(mean): 87 (28 Oct 2022 06:00) (71 - 106)  ABP: --  ABP(mean): --  RR: 27 (28 Oct 2022 06:00) (16 - 33)  SpO2: 95% (28 Oct 2022 06:00) (93% - 100%)    O2 Parameters below as of 28 Oct 2022 06:00  Patient On (Oxygen Delivery Method): ventilator    O2 Concentration (%): 50         Mode: AC/ CMV (Assist Control/ Continuous Mandatory Ventilation)  RR (machine): 18  TV (machine): 400  FiO2: 50  PEEP: 5  ITime: 1  MAP: 14  PIP: 31      Input and Output:  I&O's Detail    26 Oct 2022 07:01  -  27 Oct 2022 07:00  --------------------------------------------------------  IN:    Free Water: 735 mL    IV PiggyBack: 50 mL    Nepro with Carb Steady: 840 mL  Total IN: 1625 mL    OUT:    Indwelling Catheter - Urethral (mL): 1200 mL  Total OUT: 1200 mL    Total NET: 425 mL      27 Oct 2022 07:01  -  28 Oct 2022 06:33  --------------------------------------------------------  IN:    Free Water: 490 mL    IV PiggyBack: 50 mL    Nepro with Carb Steady: 840 mL  Total IN: 1380 mL    OUT:    Indwelling Catheter - Urethral (mL): 800 mL  Total OUT: 800 mL    Total NET: 580 mL          Lab Data                        8.0    10.05 )-----------( 178      ( 27 Oct 2022 06:00 )             27.4     10-27    141  |  107  |  30<H>  ----------------------------<  165<H>  3.4<L>   |  26  |  0.97    Ca    8.6      27 Oct 2022 06:00    TPro  6.8  /  Alb  1.5<L>  /  TBili  0.3  /  DBili  x   /  AST  12  /  ALT  15  /  AlkPhos  108  10-27            Review of Systems	      Objective     Physical Examination    heart s1s2  lung dec BS  head nc      Pertinent Lab findings & Imaging      Annalise:  NO   Adequate UO     I&O's Detail    26 Oct 2022 07:01  -  27 Oct 2022 07:00  --------------------------------------------------------  IN:    Free Water: 735 mL    IV PiggyBack: 50 mL    Nepro with Carb Steady: 840 mL  Total IN: 1625 mL    OUT:    Indwelling Catheter - Urethral (mL): 1200 mL  Total OUT: 1200 mL    Total NET: 425 mL      27 Oct 2022 07:01  -  28 Oct 2022 06:33  --------------------------------------------------------  IN:    Free Water: 490 mL    IV PiggyBack: 50 mL    Nepro with Carb Steady: 840 mL  Total IN: 1380 mL    OUT:    Indwelling Catheter - Urethral (mL): 800 mL  Total OUT: 800 mL    Total NET: 580 mL               Discussed with:     Cultures:	        Radiology

## 2022-10-28 NOTE — PROGRESS NOTE ADULT - SUBJECTIVE AND OBJECTIVE BOX
Chief Complaint: Hypoxia    Interval Events: No events overnight.    Review of Systems:  Unable to obtain    Physical Exam:  Vital Signs Last 24 Hrs  T(C): 37.1 (28 Oct 2022 04:00), Max: 37.7 (27 Oct 2022 12:44)  T(F): 98.8 (28 Oct 2022 04:00), Max: 99.8 (27 Oct 2022 12:44)  HR: 68 (28 Oct 2022 07:12) (63 - 125)  BP: 124/76 (28 Oct 2022 06:00) (100/57 - 173/78)  BP(mean): 87 (28 Oct 2022 06:00) (71 - 106)  RR: 27 (28 Oct 2022 06:00) (16 - 30)  SpO2: 100% (28 Oct 2022 07:12) (93% - 100%)  Parameters below as of 28 Oct 2022 07:12  Patient On (Oxygen Delivery Method): ventilator,50  General: Unresponsive  HEENT: Trach  Neck: No JVD, no carotid bruit  Lungs: CTAB  CV: RRR, nl S1/S2, no M/R/G  Abdomen: S/NT/ND, +BS  Extremities: No LE edema, no cyanosis  Neuro: AAOx0  Skin: No rash    Labs:    10-28    138  |  106  |  27<H>  ----------------------------<  188<H>  3.9   |  26  |  0.96    Ca    8.7      28 Oct 2022 06:15  Phos  4.2     10-28  Mg     1.6     10-28    TPro  6.8  /  Alb  1.5<L>  /  TBili  0.3  /  DBili  x   /  AST  12  /  ALT  15  /  AlkPhos  108  10-27                        7.6    8.11  )-----------( 158      ( 28 Oct 2022 06:15 )             26.4       Telemetry: Sinus rhythm

## 2022-10-29 LAB
ANION GAP SERPL CALC-SCNC: 6 MMOL/L — SIGNIFICANT CHANGE UP (ref 5–17)
BUN SERPL-MCNC: 24 MG/DL — HIGH (ref 7–23)
CALCIUM SERPL-MCNC: 8.9 MG/DL — SIGNIFICANT CHANGE UP (ref 8.4–10.5)
CHLORIDE SERPL-SCNC: 102 MMOL/L — SIGNIFICANT CHANGE UP (ref 96–108)
CO2 SERPL-SCNC: 26 MMOL/L — SIGNIFICANT CHANGE UP (ref 22–31)
CREAT SERPL-MCNC: 0.86 MG/DL — SIGNIFICANT CHANGE UP (ref 0.5–1.3)
EGFR: 69 ML/MIN/1.73M2 — SIGNIFICANT CHANGE UP
GLUCOSE BLDC GLUCOMTR-MCNC: 129 MG/DL — HIGH (ref 70–99)
GLUCOSE BLDC GLUCOMTR-MCNC: 165 MG/DL — HIGH (ref 70–99)
GLUCOSE BLDC GLUCOMTR-MCNC: 170 MG/DL — HIGH (ref 70–99)
GLUCOSE BLDC GLUCOMTR-MCNC: 199 MG/DL — HIGH (ref 70–99)
GLUCOSE SERPL-MCNC: 164 MG/DL — HIGH (ref 70–99)
HCT VFR BLD CALC: 28.7 % — LOW (ref 34.5–45)
HGB BLD-MCNC: 8.3 G/DL — LOW (ref 11.5–15.5)
MAGNESIUM SERPL-MCNC: 1.9 MG/DL — SIGNIFICANT CHANGE UP (ref 1.6–2.6)
MCHC RBC-ENTMCNC: 26.8 PG — LOW (ref 27–34)
MCHC RBC-ENTMCNC: 28.9 GM/DL — LOW (ref 32–36)
MCV RBC AUTO: 92.6 FL — SIGNIFICANT CHANGE UP (ref 80–100)
NRBC # BLD: 0 /100 WBCS — SIGNIFICANT CHANGE UP (ref 0–0)
PLATELET # BLD AUTO: 169 K/UL — SIGNIFICANT CHANGE UP (ref 150–400)
POTASSIUM SERPL-MCNC: 4.1 MMOL/L — SIGNIFICANT CHANGE UP (ref 3.5–5.3)
POTASSIUM SERPL-SCNC: 4.1 MMOL/L — SIGNIFICANT CHANGE UP (ref 3.5–5.3)
RBC # BLD: 3.1 M/UL — LOW (ref 3.8–5.2)
RBC # FLD: 18.6 % — HIGH (ref 10.3–14.5)
SODIUM SERPL-SCNC: 134 MMOL/L — LOW (ref 135–145)
WBC # BLD: 10.02 K/UL — SIGNIFICANT CHANGE UP (ref 3.8–10.5)
WBC # FLD AUTO: 10.02 K/UL — SIGNIFICANT CHANGE UP (ref 3.8–10.5)

## 2022-10-29 PROCEDURE — 99233 SBSQ HOSP IP/OBS HIGH 50: CPT

## 2022-10-29 RX ADMIN — Medication 3 MILLIGRAM(S): at 21:27

## 2022-10-29 RX ADMIN — Medication 1 PUFF(S): at 02:00

## 2022-10-29 RX ADMIN — HEPARIN SODIUM 5000 UNIT(S): 5000 INJECTION INTRAVENOUS; SUBCUTANEOUS at 17:04

## 2022-10-29 RX ADMIN — SIMETHICONE 80 MILLIGRAM(S): 80 TABLET, CHEWABLE ORAL at 05:32

## 2022-10-29 RX ADMIN — Medication 1 PUFF(S): at 06:42

## 2022-10-29 RX ADMIN — Medication 1 PUFF(S): at 13:13

## 2022-10-29 RX ADMIN — Medication 1 MILLIGRAM(S): at 12:00

## 2022-10-29 RX ADMIN — Medication 2: at 17:05

## 2022-10-29 RX ADMIN — INSULIN GLARGINE 5 UNIT(S): 100 INJECTION, SOLUTION SUBCUTANEOUS at 08:29

## 2022-10-29 RX ADMIN — MIDODRINE HYDROCHLORIDE 10 MILLIGRAM(S): 2.5 TABLET ORAL at 05:32

## 2022-10-29 RX ADMIN — ALBUTEROL 2 PUFF(S): 90 AEROSOL, METERED ORAL at 02:00

## 2022-10-29 RX ADMIN — ALBUTEROL 2 PUFF(S): 90 AEROSOL, METERED ORAL at 06:42

## 2022-10-29 RX ADMIN — Medication 1 PUFF(S): at 20:15

## 2022-10-29 RX ADMIN — CEFTRIAXONE 100 MILLIGRAM(S): 500 INJECTION, POWDER, FOR SOLUTION INTRAMUSCULAR; INTRAVENOUS at 13:46

## 2022-10-29 RX ADMIN — MIDODRINE HYDROCHLORIDE 10 MILLIGRAM(S): 2.5 TABLET ORAL at 13:45

## 2022-10-29 RX ADMIN — PANTOPRAZOLE SODIUM 40 MILLIGRAM(S): 20 TABLET, DELAYED RELEASE ORAL at 06:29

## 2022-10-29 RX ADMIN — ALBUTEROL 2 PUFF(S): 90 AEROSOL, METERED ORAL at 20:15

## 2022-10-29 RX ADMIN — CHLORHEXIDINE GLUCONATE 15 MILLILITER(S): 213 SOLUTION TOPICAL at 17:04

## 2022-10-29 RX ADMIN — Medication 1 MILLIGRAM(S): at 17:04

## 2022-10-29 RX ADMIN — Medication 1 MILLIGRAM(S): at 05:31

## 2022-10-29 RX ADMIN — CHLORHEXIDINE GLUCONATE 1 APPLICATION(S): 213 SOLUTION TOPICAL at 05:32

## 2022-10-29 RX ADMIN — ALBUTEROL 2 PUFF(S): 90 AEROSOL, METERED ORAL at 13:13

## 2022-10-29 RX ADMIN — Medication 2: at 12:00

## 2022-10-29 RX ADMIN — CHLORHEXIDINE GLUCONATE 15 MILLILITER(S): 213 SOLUTION TOPICAL at 05:32

## 2022-10-29 RX ADMIN — Medication 1 APPLICATION(S): at 12:00

## 2022-10-29 RX ADMIN — SIMETHICONE 80 MILLIGRAM(S): 80 TABLET, CHEWABLE ORAL at 17:04

## 2022-10-29 RX ADMIN — CHLORHEXIDINE GLUCONATE 1 APPLICATION(S): 213 SOLUTION TOPICAL at 17:04

## 2022-10-29 RX ADMIN — MIDODRINE HYDROCHLORIDE 10 MILLIGRAM(S): 2.5 TABLET ORAL at 21:27

## 2022-10-29 RX ADMIN — HEPARIN SODIUM 5000 UNIT(S): 5000 INJECTION INTRAVENOUS; SUBCUTANEOUS at 05:32

## 2022-10-29 RX ADMIN — Medication 1 MILLIGRAM(S): at 21:27

## 2022-10-29 NOTE — CHART NOTE - NSCHARTNOTEFT_GEN_A_CORE
Assessment: Chart reviewed, events noted: As per chart "The pt is a 75yo F with PMH of dementia, COPD with chronic resp failure and is vent dependent, s/p PEG, hx of cardiac arrest, diastolic CHF, cor pulmonale, hx of CVA, COVID-19 infxn, CKD, anemia, multiple admissions for respiratory distress (recent admission from - diagnosed with PNA with parapneumonic pleural effusion s/p thoracentesis and Avycaz) who presents from [facility] for respiratory distress again a/w sepsis and respiratory failure due to suspected recurrent gram-negative PNA."    10/29  Pt seen at bedside for nutrition follow up, vented with trachea. Pt previously NPO from (10/18-10/22), TF initiated on (10/22), observed feeds at goal. Previous GI consulted and readjusted PEG.  Tolerating well on current tube feed regimen:  Nepro @40ml/hr x 20 hrs + Lucas provides 90kcal and 14 gm AA; water flush 35ml/hr x24 hr to provide 840 ml free H2O. No reported acute GI distress, last BM on (10/25) per flow sheets. Pertinent medications/nutrition labs reviewed; noted elevated BUN, -199 x24 hrs, glucose 164; ordered for Lantus 5 units, lispro sliding scale to aid in glycemic control. Recommend to continue with same feeding volume but change feeding duration from 20hr to 24hrs for better glycemic control. RD to remain available to adjust EN formulary, volume/rate as needed and will follow up to continue to monitor pt's nutrition status.       Factors impacting intake: [ ] none [ ] nausea  [ ] vomiting [ ] diarrhea [ ] constipation  [ ]chewing problems [ ] swallowing issues  [ x] other: NPO with tube feed    Diet Prescription: Diet, NPO with Tube Feed:   Tube Feeding Modality: Gastrostomy  Nepro with Carb Steady  Total Volume for 24 Hours (mL): 800  Continuous  Starting Tube Feed Rate {mL per Hour}: 40  Until Goal Tube Feed Rate (mL per Hour): 40  Tube Feed Duration (in Hours): 20  Tube Feed Start Time: 11:00  Free Water Flush  Pump   Rate (mL per Hour): 35   Frequency: Every Hour    Duration (Hours): 24    Start Time: 11:00  Lucas(7 Gm Arginine/7 Gm Glut/1.2 Gm HMB     Qty per Day:  1 (10-25-22 @ 10:53)    Intake: feeds at goal    Daily Weight in k.6 (29 Oct 2022 03:58)  Weight in k.9 (28 Oct 2022 04:00)  Weight in k.9 (27 Oct 2022 08:19)  Weight in k.7 (26 Oct 2022 07:49)  Weight in k.7 (25 Oct 2022 03:36)  Weight in k.5 (24 Oct 2022 05:00)  Weight in k.4 (23 Oct 2022 04:02)    -- Pt demonstrates a gradual weight after tube feed initiation, weight gain favorable/planned, will continue to monitor weight trends as able    Pertinent Medications: MEDICATIONS  (STANDING):  ALBUTerol    90 MICROgram(s) HFA Inhaler 2 Puff(s) Inhalation every 6 hours  cadexomer iodine 0.9% Gel 1 Application(s) Topical <User Schedule>  cefTRIAXone   IVPB      cefTRIAXone   IVPB 1000 milliGRAM(s) IV Intermittent every 24 hours  chlorhexidine 0.12% Liquid 15 milliLiter(s) Oral Mucosa every 12 hours  chlorhexidine 2% Cloths 1 Application(s) Topical two times a day  dextrose 5%. 1000 milliLiter(s) (50 mL/Hr) IV Continuous <Continuous>  dextrose 5%. 1000 milliLiter(s) (100 mL/Hr) IV Continuous <Continuous>  dextrose 50% Injectable 25 Gram(s) IV Push once  dextrose 50% Injectable 12.5 Gram(s) IV Push once  dextrose 50% Injectable 25 Gram(s) IV Push once  glucagon  Injectable 1 milliGRAM(s) IntraMuscular once  heparin   Injectable 5000 Unit(s) SubCutaneous every 12 hours  insulin glargine Injectable (LANTUS) 5 Unit(s) SubCutaneous every morning  insulin lispro (ADMELOG) corrective regimen sliding scale   SubCutaneous every 6 hours  ipratropium 17 MICROgram(s) HFA Inhaler 1 Puff(s) Inhalation every 6 hours  LORazepam     Tablet 1 milliGRAM(s) Oral every 6 hours  midodrine 10 milliGRAM(s) Oral every 8 hours  pantoprazole    Tablet 40 milliGRAM(s) Oral before breakfast  povidone iodine 10% Solution 1 Application(s) Topical daily  simethicone 80 milliGRAM(s) Chew every 12 hours    MEDICATIONS  (PRN):  acetaminophen     Tablet .. 650 milliGRAM(s) Oral every 6 hours PRN Temp greater or equal to 38C (100.4F), Mild Pain (1 - 3)  aluminum hydroxide/magnesium hydroxide/simethicone Suspension 30 milliLiter(s) Oral every 4 hours PRN Dyspepsia  dextrose Oral Gel 15 Gram(s) Oral once PRN Blood Glucose LESS THAN 70 milliGRAM(s)/deciliter  LORazepam   Injectable 1 milliGRAM(s) IV Push every 4 hours PRN Agitation  melatonin 3 milliGRAM(s) Oral at bedtime PRN Insomnia  ondansetron Injectable 4 milliGRAM(s) IV Push every 8 hours PRN Nausea and/or Vomiting    Pertinent Labs: 10-29 Na134 mmol/L<L> Glu 164 mg/dL<H> K+ 4.1 mmol/L Cr  0.86 mg/dL BUN 24 mg/dL<H> 10-28 Phos 4.2 mg/dL 10-27 Alb 1.5 g/dL<L>     CAPILLARY BLOOD GLUCOSE      POCT Blood Glucose.: 199 mg/dL (29 Oct 2022 11:58)  POCT Blood Glucose.: 165 mg/dL (29 Oct 2022 08:27)  POCT Blood Glucose.: 129 mg/dL (29 Oct 2022 05:46)  POCT Blood Glucose.: 176 mg/dL (28 Oct 2022 23:33)  POCT Blood Glucose.: 200 mg/dL (28 Oct 2022 17:27)      Edema per flowsheets: none noted  Skin: Sacral Stage 2, Left Glutea Fold Stage IV      Estimated Needs:   [x ] no change since previous assessment  [ ] recalculated:     Previous Nutrition Diagnosis:   [ ] Inadequate Energy Intake [ ]Inadequate Oral Intake [ ] Excessive Energy Intake   [ ] Underweight [ x] Increased Nutrient Needs [ ] Overweight/Obesity [x ] Altered Nutrition related lab values  [ ] Altered GI Function [ ] Unintended Weight Loss [ ] Food & Nutrition Related Knowledge Deficit [ ] Malnutrition     Nutrition Diagnosis is [x ] ongoing  [ ] resolved [ ] not applicable     New Nutrition Diagnosis: [ x] not applicable       Interventions:   Recommend  [ ] Change Diet To:  [ ] Nutrition Supplement  [ ] Nutrition Support  [ ] Other:     Monitoring and Evaluation:   [X ] Intake [ x ] Tolerance to diet prescription [ x ] weights [ x ] labs[ x ] follow up per protocol  [ ] other: Assessment: Chart reviewed, events noted: As per chart "The pt is a 77yo F with PMH of dementia, COPD with chronic resp failure and is vent dependent, s/p PEG, hx of cardiac arrest, diastolic CHF, cor pulmonale, hx of CVA, COVID-19 infxn, CKD, anemia, multiple admissions for respiratory distress (recent admission from - diagnosed with PNA with parapneumonic pleural effusion s/p thoracentesis and Avycaz) who presents from [facility] for respiratory distress again a/w sepsis and respiratory failure due to suspected recurrent gram-negative PNA."    10/29  Pt seen at bedside for nutrition follow up, vented with trachea. Pt previously NPO from (10/18-10/22), TF initiated on (10/22), observed feeds at goal. Previous GI consulted and readjusted PEG.  Tolerating well on current tube feed regimen:  Nepro @40ml/hr x 20 hrs + Lucas provides 90kcal and 14 gm AA; water flush 35ml/hr x24 hr to provide 840 ml free H2O. No reported acute GI distress, last BM on (10/27) per flow sheets. Pertinent medications/nutrition labs reviewed; noted elevated BUN, -199 x24 hrs, glucose 164; ordered for Lantus 5 units, lispro sliding scale to aid in glycemic control. Recommend to change feeding to NEW recommended regimen: Glucerna @40ml/hr x 24 hrs + Lucas provides 90kcal and 14 gm AA; water flush 30ml/hr x24 hr to provide 720 ml free H2O. Feeds at goal rate will provide 960 ml total volume, 1440 kcal, 79 g protein, 729 ml free water. RD to remain available to adjust EN formulary, volume/rate as needed and will follow up to continue to monitor pt's nutrition status.       Factors impacting intake: [ ] none [ ] nausea  [ ] vomiting [ ] diarrhea [ ] constipation  [ ]chewing problems [ ] swallowing issues  [ x] other: NPO with tube feed    Diet Prescription: Diet, NPO with Tube Feed:   Tube Feeding Modality: Gastrostomy  Nepro with Carb Steady  Total Volume for 24 Hours (mL): 800  Continuous  Starting Tube Feed Rate {mL per Hour}: 40  Until Goal Tube Feed Rate (mL per Hour): 40  Tube Feed Duration (in Hours): 20  Tube Feed Start Time: 11:00  Free Water Flush  Pump   Rate (mL per Hour): 35   Frequency: Every Hour    Duration (Hours): 24    Start Time: 11:00  Lucas(7 Gm Arginine/7 Gm Glut/1.2 Gm HMB     Qty per Day:  1 (10-25-22 @ 10:53)    Intake: feeds at goal    Daily Weight in k.6 (29 Oct 2022 03:58)  Weight in k.9 (28 Oct 2022 04:00)  Weight in k.9 (27 Oct 2022 08:19)  Weight in k.7 (26 Oct 2022 07:49)  Weight in k.7 (25 Oct 2022 03:36)  Weight in k.5 (24 Oct 2022 05:00)  Weight in k.4 (23 Oct 2022 04:02)    -- Pt demonstrates a gradual weight after tube feed initiation, weight gain favorable/planned, will continue to monitor weight trends as able    Pertinent Medications: MEDICATIONS  (STANDING):  ALBUTerol    90 MICROgram(s) HFA Inhaler 2 Puff(s) Inhalation every 6 hours  cadexomer iodine 0.9% Gel 1 Application(s) Topical <User Schedule>  cefTRIAXone   IVPB      cefTRIAXone   IVPB 1000 milliGRAM(s) IV Intermittent every 24 hours  chlorhexidine 0.12% Liquid 15 milliLiter(s) Oral Mucosa every 12 hours  chlorhexidine 2% Cloths 1 Application(s) Topical two times a day  dextrose 5%. 1000 milliLiter(s) (50 mL/Hr) IV Continuous <Continuous>  dextrose 5%. 1000 milliLiter(s) (100 mL/Hr) IV Continuous <Continuous>  dextrose 50% Injectable 25 Gram(s) IV Push once  dextrose 50% Injectable 12.5 Gram(s) IV Push once  dextrose 50% Injectable 25 Gram(s) IV Push once  glucagon  Injectable 1 milliGRAM(s) IntraMuscular once  heparin   Injectable 5000 Unit(s) SubCutaneous every 12 hours  insulin glargine Injectable (LANTUS) 5 Unit(s) SubCutaneous every morning  insulin lispro (ADMELOG) corrective regimen sliding scale   SubCutaneous every 6 hours  ipratropium 17 MICROgram(s) HFA Inhaler 1 Puff(s) Inhalation every 6 hours  LORazepam     Tablet 1 milliGRAM(s) Oral every 6 hours  midodrine 10 milliGRAM(s) Oral every 8 hours  pantoprazole    Tablet 40 milliGRAM(s) Oral before breakfast  povidone iodine 10% Solution 1 Application(s) Topical daily  simethicone 80 milliGRAM(s) Chew every 12 hours    MEDICATIONS  (PRN):  acetaminophen     Tablet .. 650 milliGRAM(s) Oral every 6 hours PRN Temp greater or equal to 38C (100.4F), Mild Pain (1 - 3)  aluminum hydroxide/magnesium hydroxide/simethicone Suspension 30 milliLiter(s) Oral every 4 hours PRN Dyspepsia  dextrose Oral Gel 15 Gram(s) Oral once PRN Blood Glucose LESS THAN 70 milliGRAM(s)/deciliter  LORazepam   Injectable 1 milliGRAM(s) IV Push every 4 hours PRN Agitation  melatonin 3 milliGRAM(s) Oral at bedtime PRN Insomnia  ondansetron Injectable 4 milliGRAM(s) IV Push every 8 hours PRN Nausea and/or Vomiting    Pertinent Labs: 10-29 Na134 mmol/L<L> Glu 164 mg/dL<H> K+ 4.1 mmol/L Cr  0.86 mg/dL BUN 24 mg/dL<H> 10-28 Phos 4.2 mg/dL 10-27 Alb 1.5 g/dL<L>     CAPILLARY BLOOD GLUCOSE      POCT Blood Glucose.: 199 mg/dL (29 Oct 2022 11:58)  POCT Blood Glucose.: 165 mg/dL (29 Oct 2022 08:27)  POCT Blood Glucose.: 129 mg/dL (29 Oct 2022 05:46)  POCT Blood Glucose.: 176 mg/dL (28 Oct 2022 23:33)  POCT Blood Glucose.: 200 mg/dL (28 Oct 2022 17:27)      Edema per flowsheets: none noted  Skin: Sacral Stage 2, Left Glutea Fold Stage IV      Estimated Needs:   [x ] no change since previous assessment  [ ] recalculated:     Previous Nutrition Diagnosis:   [ ] Inadequate Energy Intake [ ]Inadequate Oral Intake [ ] Excessive Energy Intake   [ ] Underweight [ x] Increased Nutrient Needs [ ] Overweight/Obesity [x ] Altered Nutrition related lab values  [ ] Altered GI Function [ ] Unintended Weight Loss [ ] Food & Nutrition Related Knowledge Deficit [ ] Malnutrition     Nutrition Diagnosis is [x ] ongoing  [ ] resolved [ ] not applicable     New Nutrition Diagnosis: [ x] not applicable       Interventions:   Recommend  [ x] Change Diet To: NEW recommended regimen: Glucerna @40ml/hr x 24 hrs + Lucas provides 90kcal and 14 gm AA; water flush 30ml/hr x24 hr to provide 720 ml free H2O. Feeds at goal rate will provide 960 ml total volume, 1440 kcal, 79 g protein, 729 ml free water  [ ] Nutrition Supplement  [ ] Nutrition Support  [ ] Other:     Monitoring and Evaluation:   [X ] Intake [ x ] Tolerance to diet prescription [ x ] weights [ x ] labs[ x ] follow up per protocol  [ ] other:

## 2022-10-29 NOTE — PROGRESS NOTE ADULT - SUBJECTIVE AND OBJECTIVE BOX
Patient is a 76y old  Female who presents with a chief complaint of Acute on chronic hypoxemic respiratory failure.        INTERVAL HPI/OVERNIGHT EVENTS: Pt is nonverbal and cannot provide ROS. No significant acute events overnight.     MEDICATIONS  (STANDING):  ALBUTerol    90 MICROgram(s) HFA Inhaler 2 Puff(s) Inhalation every 6 hours  cadexomer iodine 0.9% Gel 1 Application(s) Topical <User Schedule>  cefTRIAXone   IVPB      cefTRIAXone   IVPB 1000 milliGRAM(s) IV Intermittent every 24 hours  chlorhexidine 0.12% Liquid 15 milliLiter(s) Oral Mucosa every 12 hours  chlorhexidine 2% Cloths 1 Application(s) Topical two times a day  dextrose 5%. 1000 milliLiter(s) (100 mL/Hr) IV Continuous <Continuous>  dextrose 5%. 1000 milliLiter(s) (50 mL/Hr) IV Continuous <Continuous>  dextrose 50% Injectable 25 Gram(s) IV Push once  dextrose 50% Injectable 12.5 Gram(s) IV Push once  dextrose 50% Injectable 25 Gram(s) IV Push once  glucagon  Injectable 1 milliGRAM(s) IntraMuscular once  heparin   Injectable 5000 Unit(s) SubCutaneous every 12 hours  insulin glargine Injectable (LANTUS) 5 Unit(s) SubCutaneous every morning  insulin lispro (ADMELOG) corrective regimen sliding scale   SubCutaneous every 6 hours  ipratropium 17 MICROgram(s) HFA Inhaler 1 Puff(s) Inhalation every 6 hours  LORazepam     Tablet 1 milliGRAM(s) Oral every 6 hours  midodrine 10 milliGRAM(s) Oral every 8 hours  pantoprazole    Tablet 40 milliGRAM(s) Oral before breakfast  povidone iodine 10% Solution 1 Application(s) Topical daily  simethicone 80 milliGRAM(s) Chew every 12 hours    MEDICATIONS  (PRN):  acetaminophen     Tablet .. 650 milliGRAM(s) Oral every 6 hours PRN Temp greater or equal to 38C (100.4F), Mild Pain (1 - 3)  aluminum hydroxide/magnesium hydroxide/simethicone Suspension 30 milliLiter(s) Oral every 4 hours PRN Dyspepsia  dextrose Oral Gel 15 Gram(s) Oral once PRN Blood Glucose LESS THAN 70 milliGRAM(s)/deciliter  LORazepam   Injectable 1 milliGRAM(s) IV Push every 4 hours PRN Agitation  melatonin 3 milliGRAM(s) Oral at bedtime PRN Insomnia  ondansetron Injectable 4 milliGRAM(s) IV Push every 8 hours PRN Nausea and/or Vomiting      Allergies    codeine (Hives)    Intolerances        REVIEW OF SYSTEMS:  Pt is nonverbal and cannot provide ROS.    Vital Signs Last 24 Hrs  T(C): 37.2 (29 Oct 2022 12:00), Max: 37.2 (29 Oct 2022 12:00)  T(F): 98.9 (29 Oct 2022 12:00), Max: 98.9 (29 Oct 2022 12:00)  HR: 77 (29 Oct 2022 18:00) (62 - 88)  BP: 113/70 (29 Oct 2022 18:00) (105/64 - 147/66)  BP(mean): 82 (29 Oct 2022 18:00) (76 - 96)  RR: 30 (29 Oct 2022 18:00) (18 - 34)  SpO2: 97% (29 Oct 2022 18:00) (93% - 100%)    Parameters below as of 29 Oct 2022 18:00  Patient On (Oxygen Delivery Method): ventilator        PHYSICAL EXAM:  GENERAL: chronically ill-appearing, contracted  HEENT:  anicteric, dry mucous membranes, mouth agape at baseline ; trach with vent  CHEST/LUNG:  decreased breath sounds in lower lung fields  HEART:  RRR, S1, S2  ABDOMEN:  +BS, soft, no apparent tenderness, distended, PEG in place  EXTREMITIES: UEs contracted; no cyanosis or apparent calf tenderness  NERVOUS SYSTEM: cannot answer questions and does not follow commands at baseline    LABS:                        8.3    10.02 )-----------( 169      ( 29 Oct 2022 06:44 )             28.7     CBC Full  -  ( 29 Oct 2022 06:44 )  WBC Count : 10.02 K/uL  Hemoglobin : 8.3 g/dL  Hematocrit : 28.7 %  Platelet Count - Automated : 169 K/uL  Mean Cell Volume : 92.6 fl  Mean Cell Hemoglobin : 26.8 pg  Mean Cell Hemoglobin Concentration : 28.9 gm/dL  Auto Neutrophil # : x  Auto Lymphocyte # : x  Auto Monocyte # : x  Auto Eosinophil # : x  Auto Basophil # : x  Auto Neutrophil % : x  Auto Lymphocyte % : x  Auto Monocyte % : x  Auto Eosinophil % : x  Auto Basophil % : x    29 Oct 2022 06:44    134    |  102    |  24     ----------------------------<  164    4.1     |  26     |  0.86     Ca    8.9        29 Oct 2022 06:44  Mg     1.9       29 Oct 2022 06:44          CAPILLARY BLOOD GLUCOSE      POCT Blood Glucose.: 170 mg/dL (29 Oct 2022 17:03)  POCT Blood Glucose.: 199 mg/dL (29 Oct 2022 11:58)  POCT Blood Glucose.: 165 mg/dL (29 Oct 2022 08:27)  POCT Blood Glucose.: 129 mg/dL (29 Oct 2022 05:46)  POCT Blood Glucose.: 176 mg/dL (28 Oct 2022 23:33)          RADIOLOGY & ADDITIONAL TESTS:    Personally reviewed.     Consultant(s) Notes Reviewed:  [x] YES  [ ] NO

## 2022-10-29 NOTE — PROGRESS NOTE ADULT - ASSESSMENT
75yo F with PMH of dementia, COPD with chronic resp failure and is vent dependent, s/p PEG, hx of cardiac arrest, diastolic CHF, cor pulmonale, hx of CVA, COVID-19 infxn, CKD, anemia, multiple admissions for respiratory failure (recent admissions in 06/2022 and 08/2022 diagnosed with PNA with parapneumonic pleural effusion s/p thoracentesis and Avycaz) who presents from [facility] for respiratory distress again a/w sepsis and acute on chronic hypoxic respiratory failure due to suspected recurrent VAP.    Acute on Chronic hypoxic RF  Steno PNA  UTI  RYAN    Plan  Ativan 1mg q6 PO and 1mg q4prn for agitation   Keep MAP above 65, midodrine 10mg q8  Vent bundle in place, trach care, titrate vent to spo2 of 92% and higher   nebs ATC  Tube feeds as tolerated  strict I/o  Rocephin for E.coli UTI   ISS q6 with lantus 5 units HS   Heparin SQ + SCDs  Maintain in ICU

## 2022-10-29 NOTE — PROGRESS NOTE ADULT - ASSESSMENT
75yo F with PMH of dementia, COPD with chronic resp failure and is vent dependent, s/p PEG, hx of cardiac arrest, diastolic CHF, cor pulmonale, hx of CVA, COVID-19 infxn, CKD, anemia, multiple admissions for respiratory failure (recent admissions in 06/2022 and 08/2022 diagnosed with PNA with parapneumonic pleural effusion s/p thoracentesis and Avycaz) who presents from [facility] for respiratory distress again a/w sepsis and acute on chronic hypoxic respiratory failure due to suspected recurrent VAP.    Severe sepsis and acute hypoxic respiratory failure 2/2 gram-negative PNA   - titrating down FiO2 on vent as able - has had some success maintaining saturation on 40% FiO2 now -- continue to taper down as able  - had 7-day course of levaquin per ID for PNA (Stenotrophomonas on culture) ; and will c/w just rocephin now for potential CAUTI (10-day course to be completed with dose on 10/30)  - ID ([facility]), recs appreciated   - procalcitonin high but improving 23 ->6.6 within a couple of days of starting Abx and now down to 0.47 on 10/27/22  - cautious use of fluids given hx of CHF and severe hypoalbuminemia (received 1750cc of NS in ED)  - blood cultures (NGTD), urine culture growing E coli sensitive to rocephin  - trach sputum culture - grew Stenotrophomonas sensitive to levaquin  - Checked CT Abd/P to eval for any other potential source sign of infection and found no significant sign of intra-abdominal infection on CT  - c/w midodrine with hold parameters -- consider titrating dose if BP elevated  - cc/pulm consult (Jaime), recs appreciated  - palliative care (Emre), recs appreciated - pt is full code  - leukocytosis resolving    Diastolic CHF  Hx of Pleural effusions  - last TTE was 5/30 with EF 65% with normal LVSF, dilated RV, and moderate pHTN -> repeat TTE appears unchanged  - pt also with severe hypoalbuminemia   - may need repeat thoracentesis (had 1.5L drained few admissions ago), pulmonology consulted; pl effusion was parapneumonic on past admission as opposed to transudative and thus less likely related to CHF    RYAN on CKD 3  - likely in part ATN due to sepsis and in part prerenal   - renal function recovered to baseline CKD 3  - renally dose medications and monitor BMP and electrolytes   - given pt has low muscle mass from being bedbound for years, this GFR estimate is likely falsely high  - monitor BMP  - hypokalemia repleted with KCl    Anemia of chronic disease  - has been evaluated by GI and hematology in the past -> dx with anemia of chronic disease with intermittent GI bleeding  - s/p 2 unit PRBC   - current Hgb generally stable ~ 7.5-8; monitor for possible need for another PRBC  - no jacqueline GI bleed per nursing     hx of HTN  - not on any anti-hypertensive meds at facility  - monitor VS    DM2  - NPO with TF  - c/w moderate dose lispro coverage sliding scale q6h  - goal -180 - mildly above goal - c/w lantus 5un sq QAM     Diet  - TF was on hold 2/2 leak around PEG site, GI readjusted PEG and not leaking currently -- c/w tube feeds per dietician recs  - GI (Leia/ group), recs appreciated     Preventive measures  - cont with heparin subq for DVT ppx  - Full Code

## 2022-10-29 NOTE — PROGRESS NOTE ADULT - ASSESSMENT
REVIEW OF SYMPTOMS      Able to give (reliable) ROS  NO     PHYSICAL EXAM    HEENT Unremarkable  atraumatic   RESP Fair air entry EXP prolonged    Harsh breath sound Resp distres mild   CARDIAC S1 S2 No S3     NO JVD    ABDOMEN SOFT BS PRESENT NOT DISTENDED No hepatosplenomegaly   PEDAL EDEMA present No calf tenderness  NO rash       GENERAL DATA .   GOC.  10/18/2022 full code       ALLGY. codeine                            WT.          10/18/2022 48  BMI.          10/18/2022 17                     ICU STAY. 10/18/2022  COVID.  10/18/2022 scv2 (-)     BEST PRACTICE ISSUES.    HOB ELEVATN. Yes  DVT PPLX.   10/18/2022 hpsc    SQUIRES PPLX.  10/18/2022 protonix 40     INFN PPLX.    10/18/2022 ch;lorhexidine .12%  10/19/2022 chlorhexidine 2%    SP SW EM.        DIET.     10/19/2022 nepro 800 gt    VS/ PO/IO/ VENT/ DRIPS.  10/29/2022 70 110/70   10/29/2022 ac 18/400/5/.4     ASSESSMENT/RECOMMENDATIONS .   RESP.  -- Gas exchange.   10/18/2022 ac 18/400/100/5 751/37/257  -- VENT MANAGEMENT.   HOB elevation  Target Pplat 30 (-)  Target PO 90-95%  Target pH 730 (+)  Daily spontaneous breathing trials   Daily sedation vacation   -- Pleuralk effsn  Past ritchie   R THORAC   6/8/2022  g 161 l 222 p 4.9 p 10 l 16 .38    L THORAC   5/25/2022 g 183 l 144/381 .37 p 3.4/5.3 .64 p 23 l 36   5/25/2022l pl fluid  lymph pred exudate   cyto (-)     Ct  10/18/2022 sm r mod l effs large subpulmonic component   a/r No thoracentesis plannd at this point risk prohibitive in vent pt   -- Mediastinal lne.  Ct  10/18/2022 again enlarged mediastinal lns likely reactive   a/r will need followup with ct in 2m and consider biopsy if persists   -- wheeze.  10/18 albuterol hf  10/18 atrovent hf   INFECTION.  PAST ID ISSUES   8/19/2022  zosyn Se Vignesh  8/19/2022 bactrim ds 2 bid   pmh 5/2/2022 SERRATIA CARBAPENEM RESISTANT PSEUDOMONAS  -- VAP 10/18/2022.  -- UTI 10/18/2022   w 10/18-10/19-10/20-10/21-10/23-10/25-10/26-10/27-10/28/2022 w 13 - 8.8 - 15-10.3 - 9.4- 12.3 - 15.6- 11.6  - 10 - 8   pr 10/20-10/27/2022 6.6  - .4   ua 10/18/2022 w 11-25   ct ch 10/18/2022 cw 8/18/20232 ggo post upper lobes with interlobular septal thickening infiltrate or atelectasis lower lobes l more than   sm r and sm to mod l effsn  rvp 10/18/2022 (-)   uc 10/18 100K E coli   sp 10/19 Stenotrophomonas   bc 10/18 (-)   10/18/2022 meropenem Dr NEWTON  dc  10/18/2022 ID Dr Brantley on case   10/19-10/26/2022 levaquin 750 x 7 d   10/21/2022 rocephin x 10d Dr BRITTANY Brantley      CARDIAC.  -- Hypotension.  10/18/2022 88/48   echo 8/19/2022 n lvsf dd1 pasp 58   10/20 midocrine 10.3   Target MAP 65 (+)   resolvd   GI.  -- ELEVATED LFTS.  LFTS 10/18-10/19-10/20/2022   ap 140-131- 104  ast - 44  alt 21 - 103 - 64   monitor  HEMAT.  -- Anemia.  Hb 10/18-10/19-10/20-10/21-10/22-10/23-10/24-10/25-10/26-10/27-10/28-10/29/2022 Hb 7.4 -  6.3 - 8.2  - 7.4 - 8 - 8.7 - 8.4 - 8.8 - 7.7 - 8 - 7.8 - 8.3  inr 10/18/2022 inr 1.23   ctap 10/20 No rp bl  Monitor  target Hb 7 (+)   -- TRANSFUSION  10/19/2022 2 U PRBC     OVERALL ASSESSMENT/DISPOSITION.  76 f PMH trach peg cva cac anoxic encephalo PH 8/19/2022 pasp 58 bl pl effs  r thora 6/8/2022 and l thora 5/25/2022 were lp exudates  recurrent hospitalizations HO CR psuedomonas 5/2/2022 zosyn 8/19/2022 admitted 10/18/2022 with resp distress     VAP   UTI   uc 10/18 100K E coli   sp 10/19 Stenotrophomonas   10/19-10/26/2022 levaquin 750   10/21/2022 rocephin x 7d Dr BRITTANY Brantley   Vent mgmt   Hypotension  10/20 midocrine 10.3  Anemia  10/19/2022 2 U PRBC   Woody 10/23/2022      TIME SPENT   Over 39 minutes aggregate critical care time spent on encounter; activities included   direct patient care, counseling and/or coordinating care reviewing notes, lab data/ imaging , discussion with multidisciplinary team/ patient  /family and explaining in detail risks, benefits, alternatives  of the recommendations     CHAPINCITO GUIDRY 76 f [facility] S 10/18/2022   DR ANG PADGETT

## 2022-10-29 NOTE — PROGRESS NOTE ADULT - SUBJECTIVE AND OBJECTIVE BOX
Date/Time Patient Seen:  		  Referring MD:   Data Reviewed	       Patient is a 76y old  Female who presents with a chief complaint of Acute on chronic hypoxemic respiratory failure (28 Oct 2022 19:55)      Subjective/HPI     PAST MEDICAL & SURGICAL HISTORY:  Dementia of frontal lobe type    Aphasic stroke    Diabetes mellitus    Respiratory failure    Hypertension    GERD (gastroesophageal reflux disease)    Constipation    Respiratory failure    CVA (cerebral vascular accident)    HTN (hypertension)    DM (diabetes mellitus)    Advanced dementia    COVID-19 virus detected    Quadriplegia    Pneumonia    Type II diabetes mellitus    Hx of appendectomy    Gastrostomy in place    Tracheostomy in place    Tracheostomy tube present    Feeding by G-tube          Medication list         MEDICATIONS  (STANDING):  ALBUTerol    90 MICROgram(s) HFA Inhaler 2 Puff(s) Inhalation every 6 hours  cadexomer iodine 0.9% Gel 1 Application(s) Topical <User Schedule>  cefTRIAXone   IVPB      cefTRIAXone   IVPB 1000 milliGRAM(s) IV Intermittent every 24 hours  chlorhexidine 0.12% Liquid 15 milliLiter(s) Oral Mucosa every 12 hours  chlorhexidine 2% Cloths 1 Application(s) Topical two times a day  dextrose 5%. 1000 milliLiter(s) (100 mL/Hr) IV Continuous <Continuous>  dextrose 5%. 1000 milliLiter(s) (50 mL/Hr) IV Continuous <Continuous>  dextrose 50% Injectable 25 Gram(s) IV Push once  dextrose 50% Injectable 12.5 Gram(s) IV Push once  dextrose 50% Injectable 25 Gram(s) IV Push once  glucagon  Injectable 1 milliGRAM(s) IntraMuscular once  heparin   Injectable 5000 Unit(s) SubCutaneous every 12 hours  insulin glargine Injectable (LANTUS) 5 Unit(s) SubCutaneous every morning  insulin lispro (ADMELOG) corrective regimen sliding scale   SubCutaneous every 6 hours  ipratropium 17 MICROgram(s) HFA Inhaler 1 Puff(s) Inhalation every 6 hours  LORazepam     Tablet 1 milliGRAM(s) Oral every 6 hours  midodrine 10 milliGRAM(s) Oral every 8 hours  pantoprazole    Tablet 40 milliGRAM(s) Oral before breakfast  povidone iodine 10% Solution 1 Application(s) Topical daily  simethicone 80 milliGRAM(s) Chew every 12 hours    MEDICATIONS  (PRN):  acetaminophen     Tablet .. 650 milliGRAM(s) Oral every 6 hours PRN Temp greater or equal to 38C (100.4F), Mild Pain (1 - 3)  aluminum hydroxide/magnesium hydroxide/simethicone Suspension 30 milliLiter(s) Oral every 4 hours PRN Dyspepsia  dextrose Oral Gel 15 Gram(s) Oral once PRN Blood Glucose LESS THAN 70 milliGRAM(s)/deciliter  LORazepam   Injectable 1 milliGRAM(s) IV Push every 4 hours PRN Agitation  melatonin 3 milliGRAM(s) Oral at bedtime PRN Insomnia  ondansetron Injectable 4 milliGRAM(s) IV Push every 8 hours PRN Nausea and/or Vomiting         Vitals log        ICU Vital Signs Last 24 Hrs  T(C): 36.2 (29 Oct 2022 03:58), Max: 37.2 (28 Oct 2022 08:30)  T(F): 97.1 (29 Oct 2022 03:58), Max: 98.9 (28 Oct 2022 08:30)  HR: 66 (29 Oct 2022 06:00) (62 - 88)  BP: 115/61 (29 Oct 2022 06:00) (106/60 - 147/66)  BP(mean): 76 (29 Oct 2022 06:00) (76 - 104)  ABP: --  ABP(mean): --  RR: 20 (29 Oct 2022 06:00) (18 - 38)  SpO2: 100% (29 Oct 2022 06:00) (92% - 100%)    O2 Parameters below as of 29 Oct 2022 02:01  Patient On (Oxygen Delivery Method): ventilator,40%                 Input and Output:  I&O's Detail    27 Oct 2022 07:01  -  28 Oct 2022 07:00  --------------------------------------------------------  IN:    Free Water: 490 mL    IV PiggyBack: 50 mL    Nepro with Carb Steady: 840 mL  Total IN: 1380 mL    OUT:    Indwelling Catheter - Urethral (mL): 800 mL  Total OUT: 800 mL    Total NET: 580 mL      28 Oct 2022 07:01  -  29 Oct 2022 06:04  --------------------------------------------------------  IN:    Free Water: 840 mL    Nepro with Carb Steady: 960 mL  Total IN: 1800 mL    OUT:    Indwelling Catheter - Urethral (mL): 1000 mL  Total OUT: 1000 mL    Total NET: 800 mL          Lab Data                        7.6    8.11  )-----------( 158      ( 28 Oct 2022 06:15 )             26.4     10-28    138  |  106  |  27<H>  ----------------------------<  188<H>  3.9   |  26  |  0.96    Ca    8.7      28 Oct 2022 06:15  Phos  4.2     10-28  Mg     1.6     10-28              Review of Systems	      Objective     Physical Examination    heart s1s2  lung dec BS  head nc      Pertinent Lab findings & Imaging      Annalise:  NO   Adequate UO     I&O's Detail    27 Oct 2022 07:01  -  28 Oct 2022 07:00  --------------------------------------------------------  IN:    Free Water: 490 mL    IV PiggyBack: 50 mL    Nepro with Carb Steady: 840 mL  Total IN: 1380 mL    OUT:    Indwelling Catheter - Urethral (mL): 800 mL  Total OUT: 800 mL    Total NET: 580 mL      28 Oct 2022 07:01  -  29 Oct 2022 06:04  --------------------------------------------------------  IN:    Free Water: 840 mL    Nepro with Carb Steady: 960 mL  Total IN: 1800 mL    OUT:    Indwelling Catheter - Urethral (mL): 1000 mL  Total OUT: 1000 mL    Total NET: 800 mL               Discussed with:     Cultures:	        Radiology

## 2022-10-29 NOTE — PROGRESS NOTE ADULT - SUBJECTIVE AND OBJECTIVE BOX
Patient is a 76y old  Female who presents with a chief complaint of Acute on Hypoxemic respiratory failure (29 Oct 2022 11:11)      BRIEF HOSPITAL COURSE:   75yo F with PMH of dementia, COPD with chronic resp failure and is vent dependent, s/p PEG, hx of cardiac arrest, diastolic CHF, cor pulmonale, hx of CVA, COVID-19 infxn, CKD, anemia, multiple admissions for respiratory failure (recent admissions in 06/2022 and 08/2022 diagnosed with PNA with parapneumonic pleural effusion s/p thoracentesis and Avycaz) who presents from [facility] for respiratory distress again a/w sepsis and acute on chronic hypoxic respiratory failure due to suspected recurrent VAP.      Events last 24 hours:   no issues       PAST MEDICAL & SURGICAL HISTORY:  Dementia of frontal lobe type      Aphasic stroke      Diabetes mellitus      Respiratory failure      Hypertension      GERD (gastroesophageal reflux disease)      Constipation      Respiratory failure      CVA (cerebral vascular accident)      HTN (hypertension)      DM (diabetes mellitus)      Advanced dementia      COVID-19 virus detected      Quadriplegia      Pneumonia      Type II diabetes mellitus      Hx of appendectomy      Gastrostomy in place      Tracheostomy in place      Tracheostomy tube present      Feeding by G-tube        Allergies    codeine (Hives)    Intolerances      FAMILY HISTORY:      social hx:  unable to assess 2.2 trach     Review of Systems:  unable to assess 2.2 trach       Medications:  cefTRIAXone   IVPB      cefTRIAXone   IVPB 1000 milliGRAM(s) IV Intermittent every 24 hours    midodrine 10 milliGRAM(s) Oral every 8 hours    ALBUTerol    90 MICROgram(s) HFA Inhaler 2 Puff(s) Inhalation every 6 hours  ipratropium 17 MICROgram(s) HFA Inhaler 1 Puff(s) Inhalation every 6 hours    acetaminophen     Tablet .. 650 milliGRAM(s) Oral every 6 hours PRN  LORazepam     Tablet 1 milliGRAM(s) Oral every 6 hours  LORazepam   Injectable 1 milliGRAM(s) IV Push every 4 hours PRN  melatonin 3 milliGRAM(s) Oral at bedtime PRN  ondansetron Injectable 4 milliGRAM(s) IV Push every 8 hours PRN      heparin   Injectable 5000 Unit(s) SubCutaneous every 12 hours    aluminum hydroxide/magnesium hydroxide/simethicone Suspension 30 milliLiter(s) Oral every 4 hours PRN  pantoprazole    Tablet 40 milliGRAM(s) Oral before breakfast  simethicone 80 milliGRAM(s) Chew every 12 hours      dextrose 50% Injectable 25 Gram(s) IV Push once  dextrose 50% Injectable 12.5 Gram(s) IV Push once  dextrose 50% Injectable 25 Gram(s) IV Push once  dextrose Oral Gel 15 Gram(s) Oral once PRN  glucagon  Injectable 1 milliGRAM(s) IntraMuscular once  insulin glargine Injectable (LANTUS) 5 Unit(s) SubCutaneous every morning  insulin lispro (ADMELOG) corrective regimen sliding scale   SubCutaneous every 6 hours    dextrose 5%. 1000 milliLiter(s) IV Continuous <Continuous>  dextrose 5%. 1000 milliLiter(s) IV Continuous <Continuous>      cadexomer iodine 0.9% Gel 1 Application(s) Topical <User Schedule>  chlorhexidine 0.12% Liquid 15 milliLiter(s) Oral Mucosa every 12 hours  chlorhexidine 2% Cloths 1 Application(s) Topical two times a day  povidone iodine 10% Solution 1 Application(s) Topical daily        Mode: AC/ CMV (Assist Control/ Continuous Mandatory Ventilation)  RR (machine): 18  TV (machine): 400  FiO2: 40  PEEP: 5  ITime: 1  MAP: 14  PIP: 32      ICU Vital Signs Last 24 Hrs  T(C): 37.2 (29 Oct 2022 12:00), Max: 37.2 (29 Oct 2022 12:00)  T(F): 98.9 (29 Oct 2022 12:00), Max: 98.9 (29 Oct 2022 12:00)  HR: 77 (29 Oct 2022 18:00) (62 - 88)  BP: 113/70 (29 Oct 2022 18:00) (105/64 - 147/66)  BP(mean): 82 (29 Oct 2022 18:00) (76 - 96)  ABP: --  ABP(mean): --  RR: 30 (29 Oct 2022 18:00) (18 - 34)  SpO2: 97% (29 Oct 2022 18:00) (93% - 100%)    O2 Parameters below as of 29 Oct 2022 18:00  Patient On (Oxygen Delivery Method): ventilator          Vital Signs Last 24 Hrs  T(C): 37.2 (29 Oct 2022 12:00), Max: 37.2 (29 Oct 2022 12:00)  T(F): 98.9 (29 Oct 2022 12:00), Max: 98.9 (29 Oct 2022 12:00)  HR: 77 (29 Oct 2022 18:00) (62 - 88)  BP: 113/70 (29 Oct 2022 18:00) (105/64 - 147/66)  BP(mean): 82 (29 Oct 2022 18:00) (76 - 96)  RR: 30 (29 Oct 2022 18:00) (18 - 34)  SpO2: 97% (29 Oct 2022 18:00) (93% - 100%)    Parameters below as of 29 Oct 2022 18:00  Patient On (Oxygen Delivery Method): ventilator            I&O's Detail    28 Oct 2022 07:01  -  29 Oct 2022 07:00  --------------------------------------------------------  IN:    Free Water: 840 mL    Nepro with Carb Steady: 960 mL  Total IN: 1800 mL    OUT:    Indwelling Catheter - Urethral (mL): 1000 mL  Total OUT: 1000 mL    Total NET: 800 mL      29 Oct 2022 07:01  -  29 Oct 2022 19:18  --------------------------------------------------------  IN:    Free Water: 70 mL    Glucerna 1.5: 200 mL    Nepro with Carb Steady: 240 mL  Total IN: 510 mL    OUT:  Total OUT: 0 mL    Total NET: 510 mL            LABS:                        8.3    10.02 )-----------( 169      ( 29 Oct 2022 06:44 )             28.7     10-29    134<L>  |  102  |  24<H>  ----------------------------<  164<H>  4.1   |  26  |  0.86    Ca    8.9      29 Oct 2022 06:44  Phos  4.2     10-28  Mg     1.9     10-29            CAPILLARY BLOOD GLUCOSE      POCT Blood Glucose.: 170 mg/dL (29 Oct 2022 17:03)        CULTURES:      Physical Examination:    General: elderly female ,trach to vent, chronic ill appearing     HEENT: Pupils equal, reactive to light.  Symmetric.    PULM: b/l air entry     CVS: +s1/s2    ABD: Soft, + peg    EXT: Ansarca     SKIN: Warm     Neuro: unresponsive     RADIOLOGY:   < from: Xray Chest 1 View- PORTABLE-Urgent (Xray Chest 1 View- PORTABLE-Urgent .) (10.19.22 @ 15:11) >  COMPARISON:8/19/2022    Portable chest 10/18/2022 at 11:27 AM  Portable chest 10/19/2022 at 2:32 PM    FINDINGS: Limited by patient's arms overlying the lower chest.  Heart/Vascular: The heart size, mediastinum, hilum and aorta are within   normal limits for projection.  Pulmonary: Midline trachea. There are diffuse airspace infiltrates. There   are small pleural effusions. There is no pneumothorax.   Esophagus appears distended.  Bones: There is no fracture.  Lines and catheter: Tracheostomy in place. Tip of right IJ catheter in   distal SVC. No pneumothorax.    Impression:    There are diffuse airspace infiltrates. There are small pleural effusions.    The esophagus appears distended.    Tracheostomy and right IJ catheters in place. No evidence of pneumothorax.    < end of copied text >

## 2022-10-29 NOTE — PROGRESS NOTE ADULT - SUBJECTIVE AND OBJECTIVE BOX
BREA BECKHAM     [facility]    Allergies    codeine (Hives)    Intolerances        PAST MEDICAL & SURGICAL HISTORY:  Dementia of frontal lobe type      Aphasic stroke      Diabetes mellitus      Respiratory failure      Hypertension      GERD (gastroesophageal reflux disease)      Constipation      Respiratory failure      CVA (cerebral vascular accident)      HTN (hypertension)      DM (diabetes mellitus)      Advanced dementia      COVID-19 virus detected      Quadriplegia      Pneumonia      Type II diabetes mellitus      Hx of appendectomy      Gastrostomy in place      Tracheostomy in place      Tracheostomy tube present      Feeding by G-tube          FAMILY HISTORY:      Home Medications:  albuterol 90 mcg/inh inhalation aerosol with adapter: 2  inhaled every 6 hours (18 Oct 2022 11:42)  Bacid (LAC) oral tablet: 2 tab(s) by gastrostomy tube once a day (18 Oct 2022 11:42)  Betadine 10% topical swab: cleanse right and left great toes (18 Oct 2022 11:42)  chlorhexidine 0.12% mucous membrane liquid: 15 milliliter(s) mucous membrane 2 times a day (18 Oct 2022 11:42)  Eucerin topical cream: Apply topically to affected area once a day bilateral feet (18 Oct 2022 11:42)  insulin glargine 100 units/mL subcutaneous solution: 8 unit(s) subcutaneous once a day (in the morning) (18 Oct 2022 11:42)  ipratropium-albuterol 0.5 mg-2.5 mg/3 mL inhalation solution: 3 milliliter(s) inhaled 4 times a day (18 Oct 2022 11:42)  LORazepam 1 mg oral tablet: 1 tab(s) by gastrostomy tube every 4 hours (18 Oct 2022 11:42)  methocarbamol 500 mg oral tablet: 1 tab(s) by gastrostomy tube 2 times a day (18 Oct 2022 11:42)  MiraLax oral powder for reconstitution: g-tube (18 Oct 2022 13:04)  Multiple Vitamins oral tablet: 1 tab(s) orally once a day (18 Oct 2022 11:42)  nystatin 100,000 units/g topical powder: 1 application topically 3 times a day (18 Oct 2022 11:42)  omeprazole 20 mg oral delayed release capsule: orally 2 times a day (18 Oct 2022 11:42)  polyethylene glycol 3350 oral powder for reconstitution: 17 gram(s) by gastrostomy tube every 12 hours (18 Oct 2022 11:42)  senna 8.6 mg oral tablet: 3 tab(s) by gastrostomy tube once a day (at bedtime) (18 Oct 2022 11:42)  simethicone 80 mg oral tablet: g-tube (18 Oct 2022 13:04)  simethicone 80 mg oral tablet, chewable: 1 tab(s) by gastrostomy tube every 6 hours (18 Oct 2022 11:42)  sucralfate 1 g/10 mL oral suspension: 10 milliliter(s) g-tube 4 times a day (before meals and at bedtime) (18 Oct 2022 13:04)  Tylenol 325 mg oral tablet: 2 tab(s) by gastrostomy tube once a day; 60 minutes prior to dressing change  (18 Oct 2022 11:42)  Tylenol 325 mg oral tablet: 2 tab(s) by gastrostomy tube every 6 hours, As Needed (18 Oct 2022 11:42)      MEDICATIONS  (STANDING):  ALBUTerol    90 MICROgram(s) HFA Inhaler 2 Puff(s) Inhalation every 6 hours  cadexomer iodine 0.9% Gel 1 Application(s) Topical <User Schedule>  cefTRIAXone   IVPB      cefTRIAXone   IVPB 1000 milliGRAM(s) IV Intermittent every 24 hours  chlorhexidine 0.12% Liquid 15 milliLiter(s) Oral Mucosa every 12 hours  chlorhexidine 2% Cloths 1 Application(s) Topical two times a day  dextrose 5%. 1000 milliLiter(s) (50 mL/Hr) IV Continuous <Continuous>  dextrose 5%. 1000 milliLiter(s) (100 mL/Hr) IV Continuous <Continuous>  dextrose 50% Injectable 25 Gram(s) IV Push once  dextrose 50% Injectable 12.5 Gram(s) IV Push once  dextrose 50% Injectable 25 Gram(s) IV Push once  glucagon  Injectable 1 milliGRAM(s) IntraMuscular once  heparin   Injectable 5000 Unit(s) SubCutaneous every 12 hours  insulin glargine Injectable (LANTUS) 5 Unit(s) SubCutaneous every morning  insulin lispro (ADMELOG) corrective regimen sliding scale   SubCutaneous every 6 hours  ipratropium 17 MICROgram(s) HFA Inhaler 1 Puff(s) Inhalation every 6 hours  LORazepam     Tablet 1 milliGRAM(s) Oral every 6 hours  midodrine 10 milliGRAM(s) Oral every 8 hours  pantoprazole    Tablet 40 milliGRAM(s) Oral before breakfast  povidone iodine 10% Solution 1 Application(s) Topical daily  simethicone 80 milliGRAM(s) Chew every 12 hours    MEDICATIONS  (PRN):  acetaminophen     Tablet .. 650 milliGRAM(s) Oral every 6 hours PRN Temp greater or equal to 38C (100.4F), Mild Pain (1 - 3)  aluminum hydroxide/magnesium hydroxide/simethicone Suspension 30 milliLiter(s) Oral every 4 hours PRN Dyspepsia  dextrose Oral Gel 15 Gram(s) Oral once PRN Blood Glucose LESS THAN 70 milliGRAM(s)/deciliter  LORazepam   Injectable 1 milliGRAM(s) IV Push every 4 hours PRN Agitation  melatonin 3 milliGRAM(s) Oral at bedtime PRN Insomnia  ondansetron Injectable 4 milliGRAM(s) IV Push every 8 hours PRN Nausea and/or Vomiting      Diet, NPO with Tube Feed:   Tube Feeding Modality: Gastrostomy  Nepro with Carb Steady  Total Volume for 24 Hours (mL): 800  Continuous  Starting Tube Feed Rate mL per Hour: 40  Until Goal Tube Feed Rate (mL per Hour): 40  Tube Feed Duration (in Hours): 20  Tube Feed Start Time: 11:00  Free Water Flush  Pump   Rate (mL per Hour): 35   Frequency: Every Hour    Duration (Hours): 24    Start Time: 11:00  Lucas(7 Gm Arginine/7 Gm Glut/1.2 Gm HMB     Qty per Day:  1 (10-25-22 @ 10:53) [Active]          Vital Signs Last 24 Hrs  T(C): 37.1 (29 Oct 2022 08:00), Max: 37.1 (29 Oct 2022 08:00)  T(F): 98.8 (29 Oct 2022 08:00), Max: 98.8 (29 Oct 2022 08:00)  HR: 71 (29 Oct 2022 10:51) (62 - 88)  BP: 107/64 (29 Oct 2022 08:00) (106/60 - 147/66)  BP(mean): 77 (29 Oct 2022 08:00) (76 - 103)  RR: 22 (29 Oct 2022 09:00) (18 - 38)  SpO2: 96% (29 Oct 2022 10:51) (92% - 100%)    Parameters below as of 29 Oct 2022 09:00  Patient On (Oxygen Delivery Method): ventilator          10-28-22 @ 07:01  -  10-29-22 @ 07:00  --------------------------------------------------------  IN: 1800 mL / OUT: 1000 mL / NET: 800 mL        Mode: AC/ CMV (Assist Control/ Continuous Mandatory Ventilation), RR (machine): 18, TV (machine): 400, FiO2: 40, PEEP: 5, ITime: 1, MAP: 23, PIP: 38      LABS:                        8.3    10.02 )-----------( 169      ( 29 Oct 2022 06:44 )             28.7     10-29    134<L>  |  102  |  24<H>  ----------------------------<  164<H>  4.1   |  26  |  0.86    Ca    8.9      29 Oct 2022 06:44  Phos  4.2     10-28  Mg     1.9     10-29                WBC:  WBC Count: 10.02 K/uL (10-29 @ 06:44)  WBC Count: 8.11 K/uL (10-28 @ 06:15)  WBC Count: 10.05 K/uL (10-27 @ 06:00)  WBC Count: 11.65 K/uL (10-26 @ 06:23)      MICROBIOLOGY:  RECENT CULTURES:                  Sodium:  Sodium, Serum: 134 mmol/L (10-29 @ 06:44)  Sodium, Serum: 138 mmol/L (10-28 @ 06:15)  Sodium, Serum: 141 mmol/L (10-27 @ 06:00)  Sodium, Serum: 143 mmol/L (10-26 @ 06:23)      0.86 mg/dL 10-29 @ 06:44  0.96 mg/dL 10-28 @ 06:15  0.97 mg/dL 10-27 @ 06:00  0.94 mg/dL 10-26 @ 06:23      Hemoglobin:  Hemoglobin: 8.3 g/dL (10-29 @ 06:44)  Hemoglobin: 7.6 g/dL (10-28 @ 06:15)  Hemoglobin: 8.0 g/dL (10-27 @ 06:00)  Hemoglobin: 7.7 g/dL (10-26 @ 06:23)      Platelets: Platelet Count - Automated: 169 K/uL (10-29 @ 06:44)  Platelet Count - Automated: 158 K/uL (10-28 @ 06:15)  Platelet Count - Automated: 178 K/uL (10-27 @ 06:00)  Platelet Count - Automated: 162 K/uL (10-26 @ 06:23)              RADIOLOGY & ADDITIONAL STUDIES:      MICROBIOLOGY:  RECENT CULTURES:

## 2022-10-29 NOTE — PROGRESS NOTE ADULT - ASSESSMENT
Pt is a 76W w/ PMHx of DM2, COPD, HTN, dementia, hx of subarachnoid hemorrhage, and history of chronic trach brought in by ambulance for evaluation of low CO2 and cyanotic appearance.   Well-known and has had multiple admissions for Acute on chronic RF and PNA w/ MDROs    Acute VAP/PNA/Acute on chronic HF/CAUTI/UTI  - pt p/w cyanosis  - most recent admission in end of August, most recent cx w/ Pseudomonas and Stenotrophomonas  - CT chest Groundglass opacities and interlobular septal thickening suggestive of CHF. 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  We will follow along in the care of this patient. Please call us at [phone] or text me directly on my cell# at [phone] with any concerns.

## 2022-10-29 NOTE — PROGRESS NOTE ADULT - SUBJECTIVE AND OBJECTIVE BOX
Chief Complaint: Hypoxia    Interval Events: No events overnight.    Review of Systems:  Unable to obtain    Physical Exam:  Vital Signs Last 24 Hrs  T(C): 36.2 (29 Oct 2022 03:58), Max: 36.7 (28 Oct 2022 12:00)  T(F): 97.1 (29 Oct 2022 03:58), Max: 98.1 (28 Oct 2022 12:00)  HR: 72 (29 Oct 2022 07:04) (62 - 88)  BP: 115/61 (29 Oct 2022 06:00) (106/60 - 147/66)  BP(mean): 76 (29 Oct 2022 06:00) (76 - 104)  RR: 20 (29 Oct 2022 06:00) (18 - 38)  SpO2: 96% (29 Oct 2022 07:04) (92% - 100%)  Parameters below as of 29 Oct 2022 07:02  Patient On (Oxygen Delivery Method): ventilator,.40  General: Unresponsive  HEENT: Trach  Neck: No JVD, no carotid bruit  Lungs: CTAB  CV: RRR, nl S1/S2, no M/R/G  Abdomen: S/NT/ND, +BS  Extremities: No LE edema, no cyanosis  Neuro: AAOx0  Skin: No rash    Labs:    10-29    134<L>  |  102  |  24<H>  ----------------------------<  164<H>  4.1   |  26  |  0.86    Ca    8.9      29 Oct 2022 06:44  Phos  4.2     10-28  Mg     1.9     10-29                          8.3    10.02 )-----------( 169      ( 29 Oct 2022 06:44 )             28.7       Telemetry: Sinus rhythm

## 2022-10-29 NOTE — PROGRESS NOTE ADULT - SUBJECTIVE AND OBJECTIVE BOX
[facility]  Arturo Olivas MD PhD, Haylee Umanzor MD, Andressa Brantley MD, Billy Valenzuela MD, Emil Medellin MD  and providing coverage with Eusebio Chairez MD  Providing Infectious Disease Consultations at [facility], [facility], [facility], [facility]'s    Office# [phone] to schedule follow up appointments  Answering Service for urgent calls or New Consults [phone]  Cell# to text for urgent issues Arturo Olivas [phone]     infectious diseases progress note:    BREA BECKHAM is a 76y y. o. Female patient    Overnight and events of the last 24hrs reviewed    Allergies    codeine (Hives)    Intolerances        ANTIBIOTICS/RELEVANT:  antimicrobials  cefTRIAXone   IVPB      cefTRIAXone   IVPB 1000 milliGRAM(s) IV Intermittent every 24 hours    immunologic:    OTHER:  acetaminophen     Tablet .. 650 milliGRAM(s) Oral every 6 hours PRN  ALBUTerol    90 MICROgram(s) HFA Inhaler 2 Puff(s) Inhalation every 6 hours  aluminum hydroxide/magnesium hydroxide/simethicone Suspension 30 milliLiter(s) Oral every 4 hours PRN  cadexomer iodine 0.9% Gel 1 Application(s) Topical <User Schedule>  chlorhexidine 0.12% Liquid 15 milliLiter(s) Oral Mucosa every 12 hours  chlorhexidine 2% Cloths 1 Application(s) Topical two times a day  dextrose 5%. 1000 milliLiter(s) IV Continuous <Continuous>  dextrose 5%. 1000 milliLiter(s) IV Continuous <Continuous>  dextrose 50% Injectable 25 Gram(s) IV Push once  dextrose 50% Injectable 12.5 Gram(s) IV Push once  dextrose 50% Injectable 25 Gram(s) IV Push once  dextrose Oral Gel 15 Gram(s) Oral once PRN  glucagon  Injectable 1 milliGRAM(s) IntraMuscular once  heparin   Injectable 5000 Unit(s) SubCutaneous every 12 hours  insulin glargine Injectable (LANTUS) 5 Unit(s) SubCutaneous every morning  insulin lispro (ADMELOG) corrective regimen sliding scale   SubCutaneous every 6 hours  ipratropium 17 MICROgram(s) HFA Inhaler 1 Puff(s) Inhalation every 6 hours  LORazepam     Tablet 1 milliGRAM(s) Oral every 6 hours  LORazepam   Injectable 1 milliGRAM(s) IV Push every 4 hours PRN  melatonin 3 milliGRAM(s) Oral at bedtime PRN  midodrine 10 milliGRAM(s) Oral every 8 hours  ondansetron Injectable 4 milliGRAM(s) IV Push every 8 hours PRN  pantoprazole    Tablet 40 milliGRAM(s) Oral before breakfast  povidone iodine 10% Solution 1 Application(s) Topical daily  simethicone 80 milliGRAM(s) Chew every 12 hours      Objective:  Vital Signs Last 24 Hrs  T(C): 37.1 (29 Oct 2022 08:00), Max: 37.1 (29 Oct 2022 08:00)  T(F): 98.8 (29 Oct 2022 08:00), Max: 98.8 (29 Oct 2022 08:00)  HR: 68 (29 Oct 2022 09:00) (62 - 88)  BP: 107/64 (29 Oct 2022 08:00) (106/60 - 147/66)  BP(mean): 77 (29 Oct 2022 08:00) (76 - 104)  RR: 22 (29 Oct 2022 09:00) (18 - 38)  SpO2: 100% (29 Oct 2022 09:00) (92% - 100%)    Parameters below as of 29 Oct 2022 09:00  Patient On (Oxygen Delivery Method): ventilator        T(C): 37.1 (10-29-22 @ 08:00), Max: 37.7 (10-27-22 @ 12:44)  T(C): 37.1 (10-29-22 @ 08:00), Max: 37.7 (10-27-22 @ 12:44)  T(C): 37.1 (10-29-22 @ 08:00), Max: 37.7 (10-27-22 @ 12:44)    PHYSICAL EXAM:  HEENT: NC atraumatic  Neck: supple, intubated via trach  Respiratory: no accessory muscle use, breathing comfortably  Cardiovascular: distant  Gastrointestinal: normal appearing, nondistended  Extremities: no clubbing, no cyanosis,    Mode: AC/ CMV (Assist Control/ Continuous Mandatory Ventilation), RR (machine): 18, TV (machine): 400, FiO2: 40, PEEP: 5, ITime: 1, MAP: 20, PIP: 38    LABS:                          8.3    10.02 )-----------( 169      ( 29 Oct 2022 06:44 )             28.7       WBC  10.02 10-29 @ 06:44  8.11 10-28 @ 06:15  10.05 10-27 @ 06:00  11.65 10-26 @ 06:23  15.63 10-25 @ 06:37  13.05 10-24 @ 07:04  12.38 10-23 @ 06:00      10-29    134<L>  |  102  |  24<H>  ----------------------------<  164<H>  4.1   |  26  |  0.86    Ca    8.9      29 Oct 2022 06:44  Phos  4.2     10-28  Mg     1.9     10-29        Creatinine, Serum: 0.86 mg/dL (10-29-22 @ 06:44)  Creatinine, Serum: 0.96 mg/dL (10-28-22 @ 06:15)  Creatinine, Serum: 0.97 mg/dL (10-27-22 @ 06:00)  Creatinine, Serum: 0.94 mg/dL (10-26-22 @ 06:23)  Creatinine, Serum: 1.01 mg/dL (10-25-22 @ 06:37)  Creatinine, Serum: 1.13 mg/dL (10-24-22 @ 07:04)  Creatinine, Serum: 1.25 mg/dL (10-23-22 @ 06:00)                INFLAMMATORY MARKERS      MICROBIOLOGY:              RADIOLOGY & ADDITIONAL STUDIES:

## 2022-10-30 LAB
ALBUMIN SERPL ELPH-MCNC: 1.5 G/DL — LOW (ref 3.3–5)
ALP SERPL-CCNC: 128 U/L — HIGH (ref 30–120)
ALT FLD-CCNC: 14 U/L DA — SIGNIFICANT CHANGE UP (ref 10–60)
ANION GAP SERPL CALC-SCNC: 7 MMOL/L — SIGNIFICANT CHANGE UP (ref 5–17)
AST SERPL-CCNC: 16 U/L — SIGNIFICANT CHANGE UP (ref 10–40)
BILIRUB SERPL-MCNC: 0.3 MG/DL — SIGNIFICANT CHANGE UP (ref 0.2–1.2)
BUN SERPL-MCNC: 24 MG/DL — HIGH (ref 7–23)
CALCIUM SERPL-MCNC: 8.4 MG/DL — SIGNIFICANT CHANGE UP (ref 8.4–10.5)
CHLORIDE SERPL-SCNC: 102 MMOL/L — SIGNIFICANT CHANGE UP (ref 96–108)
CO2 SERPL-SCNC: 26 MMOL/L — SIGNIFICANT CHANGE UP (ref 22–31)
CREAT SERPL-MCNC: 0.87 MG/DL — SIGNIFICANT CHANGE UP (ref 0.5–1.3)
EGFR: 68 ML/MIN/1.73M2 — SIGNIFICANT CHANGE UP
GLUCOSE BLDC GLUCOMTR-MCNC: 152 MG/DL — HIGH (ref 70–99)
GLUCOSE BLDC GLUCOMTR-MCNC: 154 MG/DL — HIGH (ref 70–99)
GLUCOSE BLDC GLUCOMTR-MCNC: 170 MG/DL — HIGH (ref 70–99)
GLUCOSE BLDC GLUCOMTR-MCNC: 202 MG/DL — HIGH (ref 70–99)
GLUCOSE SERPL-MCNC: 161 MG/DL — HIGH (ref 70–99)
HCT VFR BLD CALC: 25 % — LOW (ref 34.5–45)
HGB BLD-MCNC: 7.4 G/DL — LOW (ref 11.5–15.5)
MAGNESIUM SERPL-MCNC: 1.8 MG/DL — SIGNIFICANT CHANGE UP (ref 1.6–2.6)
MCHC RBC-ENTMCNC: 27.1 PG — SIGNIFICANT CHANGE UP (ref 27–34)
MCHC RBC-ENTMCNC: 29.6 GM/DL — LOW (ref 32–36)
MCV RBC AUTO: 91.6 FL — SIGNIFICANT CHANGE UP (ref 80–100)
NRBC # BLD: 0 /100 WBCS — SIGNIFICANT CHANGE UP (ref 0–0)
PLATELET # BLD AUTO: 157 K/UL — SIGNIFICANT CHANGE UP (ref 150–400)
POTASSIUM SERPL-MCNC: 4.2 MMOL/L — SIGNIFICANT CHANGE UP (ref 3.5–5.3)
POTASSIUM SERPL-SCNC: 4.2 MMOL/L — SIGNIFICANT CHANGE UP (ref 3.5–5.3)
PROT SERPL-MCNC: 6.4 G/DL — SIGNIFICANT CHANGE UP (ref 6–8.3)
RBC # BLD: 2.73 M/UL — LOW (ref 3.8–5.2)
RBC # FLD: 18.6 % — HIGH (ref 10.3–14.5)
SODIUM SERPL-SCNC: 135 MMOL/L — SIGNIFICANT CHANGE UP (ref 135–145)
WBC # BLD: 7.09 K/UL — SIGNIFICANT CHANGE UP (ref 3.8–10.5)
WBC # FLD AUTO: 7.09 K/UL — SIGNIFICANT CHANGE UP (ref 3.8–10.5)

## 2022-10-30 PROCEDURE — 99233 SBSQ HOSP IP/OBS HIGH 50: CPT

## 2022-10-30 RX ORDER — CEFTRIAXONE 500 MG/1
1000 INJECTION, POWDER, FOR SOLUTION INTRAMUSCULAR; INTRAVENOUS ONCE
Refills: 0 | Status: COMPLETED | OUTPATIENT
Start: 2022-10-30 | End: 2022-10-30

## 2022-10-30 RX ADMIN — MIDODRINE HYDROCHLORIDE 10 MILLIGRAM(S): 2.5 TABLET ORAL at 13:16

## 2022-10-30 RX ADMIN — Medication 2: at 11:23

## 2022-10-30 RX ADMIN — ALBUTEROL 2 PUFF(S): 90 AEROSOL, METERED ORAL at 01:30

## 2022-10-30 RX ADMIN — Medication 1 MILLIGRAM(S): at 00:04

## 2022-10-30 RX ADMIN — Medication 1 APPLICATION(S): at 11:22

## 2022-10-30 RX ADMIN — MIDODRINE HYDROCHLORIDE 10 MILLIGRAM(S): 2.5 TABLET ORAL at 06:32

## 2022-10-30 RX ADMIN — Medication 1 MILLIGRAM(S): at 11:22

## 2022-10-30 RX ADMIN — ALBUTEROL 2 PUFF(S): 90 AEROSOL, METERED ORAL at 19:31

## 2022-10-30 RX ADMIN — CHLORHEXIDINE GLUCONATE 1 APPLICATION(S): 213 SOLUTION TOPICAL at 06:34

## 2022-10-30 RX ADMIN — Medication 4: at 00:29

## 2022-10-30 RX ADMIN — CHLORHEXIDINE GLUCONATE 15 MILLILITER(S): 213 SOLUTION TOPICAL at 17:37

## 2022-10-30 RX ADMIN — CHLORHEXIDINE GLUCONATE 1 APPLICATION(S): 213 SOLUTION TOPICAL at 17:38

## 2022-10-30 RX ADMIN — Medication 1 MILLIGRAM(S): at 03:33

## 2022-10-30 RX ADMIN — SIMETHICONE 80 MILLIGRAM(S): 80 TABLET, CHEWABLE ORAL at 17:37

## 2022-10-30 RX ADMIN — CHLORHEXIDINE GLUCONATE 15 MILLILITER(S): 213 SOLUTION TOPICAL at 06:34

## 2022-10-30 RX ADMIN — PANTOPRAZOLE SODIUM 40 MILLIGRAM(S): 20 TABLET, DELAYED RELEASE ORAL at 06:33

## 2022-10-30 RX ADMIN — Medication 1 PUFF(S): at 19:31

## 2022-10-30 RX ADMIN — Medication 1 PUFF(S): at 13:02

## 2022-10-30 RX ADMIN — Medication 1 MILLIGRAM(S): at 17:37

## 2022-10-30 RX ADMIN — Medication 1 PUFF(S): at 07:37

## 2022-10-30 RX ADMIN — HEPARIN SODIUM 5000 UNIT(S): 5000 INJECTION INTRAVENOUS; SUBCUTANEOUS at 06:32

## 2022-10-30 RX ADMIN — ALBUTEROL 2 PUFF(S): 90 AEROSOL, METERED ORAL at 13:02

## 2022-10-30 RX ADMIN — CEFTRIAXONE 100 MILLIGRAM(S): 500 INJECTION, POWDER, FOR SOLUTION INTRAMUSCULAR; INTRAVENOUS at 13:17

## 2022-10-30 RX ADMIN — INSULIN GLARGINE 5 UNIT(S): 100 INJECTION, SOLUTION SUBCUTANEOUS at 06:35

## 2022-10-30 RX ADMIN — ALBUTEROL 2 PUFF(S): 90 AEROSOL, METERED ORAL at 07:36

## 2022-10-30 RX ADMIN — Medication 2: at 06:33

## 2022-10-30 RX ADMIN — Medication 1 PUFF(S): at 01:30

## 2022-10-30 RX ADMIN — HEPARIN SODIUM 5000 UNIT(S): 5000 INJECTION INTRAVENOUS; SUBCUTANEOUS at 17:37

## 2022-10-30 RX ADMIN — Medication 1 MILLIGRAM(S): at 06:32

## 2022-10-30 RX ADMIN — MIDODRINE HYDROCHLORIDE 10 MILLIGRAM(S): 2.5 TABLET ORAL at 21:42

## 2022-10-30 RX ADMIN — Medication 2: at 17:41

## 2022-10-30 RX ADMIN — SIMETHICONE 80 MILLIGRAM(S): 80 TABLET, CHEWABLE ORAL at 06:33

## 2022-10-30 RX ADMIN — Medication 1 MILLIGRAM(S): at 23:47

## 2022-10-30 RX ADMIN — Medication 3 MILLIGRAM(S): at 21:43

## 2022-10-30 NOTE — PROGRESS NOTE ADULT - SUBJECTIVE AND OBJECTIVE BOX
INTERVAL HPI/OVERNIGHT EVENTS:  No new overnight event.  No N/V/D.  Tolerating diet.   MEDICATIONS  (STANDING):  ALBUTerol    90 MICROgram(s) HFA Inhaler 2 Puff(s) Inhalation every 6 hours  cadexomer iodine 0.9% Gel 1 Application(s) Topical <User Schedule>  cefTRIAXone   IVPB 1000 milliGRAM(s) IV Intermittent once  chlorhexidine 0.12% Liquid 15 milliLiter(s) Oral Mucosa every 12 hours  chlorhexidine 2% Cloths 1 Application(s) Topical two times a day  dextrose 5%. 1000 milliLiter(s) (100 mL/Hr) IV Continuous <Continuous>  dextrose 5%. 1000 milliLiter(s) (50 mL/Hr) IV Continuous <Continuous>  dextrose 50% Injectable 25 Gram(s) IV Push once  dextrose 50% Injectable 12.5 Gram(s) IV Push once  dextrose 50% Injectable 25 Gram(s) IV Push once  glucagon  Injectable 1 milliGRAM(s) IntraMuscular once  heparin   Injectable 5000 Unit(s) SubCutaneous every 12 hours  insulin glargine Injectable (LANTUS) 5 Unit(s) SubCutaneous every morning  insulin lispro (ADMELOG) corrective regimen sliding scale   SubCutaneous every 6 hours  ipratropium 17 MICROgram(s) HFA Inhaler 1 Puff(s) Inhalation every 6 hours  LORazepam     Tablet 1 milliGRAM(s) Oral every 6 hours  midodrine 10 milliGRAM(s) Oral every 8 hours  pantoprazole    Tablet 40 milliGRAM(s) Oral before breakfast  povidone iodine 10% Solution 1 Application(s) Topical daily  simethicone 80 milliGRAM(s) Chew every 12 hours    MEDICATIONS  (PRN):  acetaminophen     Tablet .. 650 milliGRAM(s) Oral every 6 hours PRN Temp greater or equal to 38C (100.4F), Mild Pain (1 - 3)  aluminum hydroxide/magnesium hydroxide/simethicone Suspension 30 milliLiter(s) Oral every 4 hours PRN Dyspepsia  dextrose Oral Gel 15 Gram(s) Oral once PRN Blood Glucose LESS THAN 70 milliGRAM(s)/deciliter  LORazepam   Injectable 1 milliGRAM(s) IV Push every 4 hours PRN Agitation  melatonin 3 milliGRAM(s) Oral at bedtime PRN Insomnia  ondansetron Injectable 4 milliGRAM(s) IV Push every 8 hours PRN Nausea and/or Vomiting      Allergies    codeine (Hives)    Intolerances        Review of Systems:    General:  No wt loss, fevers, chills, night sweats,fatigue,   Eyes:  Good vision, no reported pain  ENT:  No sore throat, pain, runny nose, dysphagia  CV:  No pain, palpitatioins, hypo/hypertension  Resp:  No dyspnea, cough, tachypnea, wheezing  GI:  No pain, No nausea, No vomiting, No diarrhea, No constipatiion, No weight loss, No fever, No pruritis, No rectal bleeding, No tarry stools, No dysphagia,  :  No pain, bleeding, incontinence, nocturia  Muscle:  No pain, weakness  Neuro:  No weakness, tingling, memory problems  Psych:  No fatigue, insomnia, mood problems, depression  Endocrine:  No polyuria, polydypsia, cold/heat intolerance  Heme:  No petechiae, ecchymosis, easy bruisability  Skin:  No rash, tattoos, scars, edema      Vital Signs Last 24 Hrs  T(C): 36.7 (30 Oct 2022 08:00), Max: 37.3 (29 Oct 2022 20:00)  T(F): 98 (30 Oct 2022 08:00), Max: 99.1 (29 Oct 2022 20:00)  HR: 71 (30 Oct 2022 10:00) (64 - 112)  BP: 130/85 (30 Oct 2022 10:00) (100/76 - 168/81)  BP(mean): 101 (30 Oct 2022 10:00) (77 - 106)  RR: 31 (30 Oct 2022 10:00) (18 - 31)  SpO2: 94% (30 Oct 2022 10:00) (93% - 100%)    Parameters below as of 30 Oct 2022 10:00  Patient On (Oxygen Delivery Method): ventilator        PHYSICAL EXAM:    Constitutional: NAD, well-developed  HEENT: EOMI, throat clear  Neck: No LAD, supple  Respiratory: CTA and P  Cardiovascular: S1 and S2, RRR, no M  Gastrointestinal: BS+, soft, NT/ND, neg HSM,  Extremities: No peripheral edema, neg clubing, cyanosis  Vascular: 2+ peripheral pulses  Neurological: A/O x 3, no focal deficits  Psychiatric: Normal mood, normal affect  Skin: No rashes      LABS:                        7.4    7.09  )-----------( 157      ( 30 Oct 2022 07:02 )             25.0     10-30    135  |  102  |  24<H>  ----------------------------<  161<H>  4.2   |  26  |  0.87    Ca    8.4      30 Oct 2022 07:02  Mg     1.8     10-30    TPro  6.4  /  Alb  1.5<L>  /  TBili  0.3  /  DBili  x   /  AST  16  /  ALT  14  /  AlkPhos  128<H>  10-30          RADIOLOGY & ADDITIONAL TESTS:

## 2022-10-30 NOTE — PROGRESS NOTE ADULT - SUBJECTIVE AND OBJECTIVE BOX
Patient is a 76y Female with a known history of :    HPI:  76F with dementia, COPD with chronic resp failure and is vent dependent, s/p PEG, hx of cardiac arrest, CHF, cor pulmonale, CVA, COVID-19 infxn, CKD, anemia, multiple admissions for respiratory distress (last admission from 6/1-6/9  and now august diagnosed with PNA with parapneumonic pleural effusion s/p course of ertapenam, who presents from [facility] for respiratory distress . Patient not verbal, demented unable to obtain any hx    ER course:  Vitals stable afebrile  Imaging:  labs noted:hb 7.4., wbc 13.,  Bun 122, Cr 2.4   (18 Oct 2022 13:07)      REVIEW OF SYSTEMS:    CONSTITUTIONAL: No fever, weight loss, or fatigue  EYES: No eye pain, visual disturbances, or discharge  ENMT:  No difficulty hearing, tinnitus, vertigo; No sinus or throat pain  NECK: No pain or stiffness  BREASTS: No pain, masses, or nipple discharge  RESPIRATORY: No cough, wheezing, chills or hemoptysis; No shortness of breath  CARDIOVASCULAR: No chest pain, palpitations, dizziness, or leg swelling  GASTROINTESTINAL: No abdominal or epigastric pain. No nausea, vomiting, or hematemesis; No diarrhea or constipation. No melena or hematochezia.  GENITOURINARY: No dysuria, frequency, hematuria, or incontinence  NEUROLOGICAL: No headaches, memory loss, loss of strength, numbness, or tremors  SKIN: No itching, burning, rashes, or lesions   LYMPH NODES: No enlarged glands  ENDOCRINE: No heat or cold intolerance; No hair loss  MUSCULOSKELETAL: No joint pain or swelling; No muscle, back, or extremity pain  PSYCHIATRIC: No depression, anxiety, mood swings, or difficulty sleeping  HEME/LYMPH: No easy bruising, or bleeding gums  ALLERGY AND IMMUNOLOGIC: No hives or eczema    MEDICATIONS  (STANDING):  ALBUTerol    90 MICROgram(s) HFA Inhaler 2 Puff(s) Inhalation every 6 hours  cadexomer iodine 0.9% Gel 1 Application(s) Topical <User Schedule>  cefTRIAXone   IVPB      cefTRIAXone   IVPB 1000 milliGRAM(s) IV Intermittent every 24 hours  chlorhexidine 0.12% Liquid 15 milliLiter(s) Oral Mucosa every 12 hours  chlorhexidine 2% Cloths 1 Application(s) Topical two times a day  dextrose 5%. 1000 milliLiter(s) (50 mL/Hr) IV Continuous <Continuous>  dextrose 5%. 1000 milliLiter(s) (100 mL/Hr) IV Continuous <Continuous>  dextrose 50% Injectable 25 Gram(s) IV Push once  dextrose 50% Injectable 12.5 Gram(s) IV Push once  dextrose 50% Injectable 25 Gram(s) IV Push once  glucagon  Injectable 1 milliGRAM(s) IntraMuscular once  heparin   Injectable 5000 Unit(s) SubCutaneous every 12 hours  insulin glargine Injectable (LANTUS) 5 Unit(s) SubCutaneous every morning  insulin lispro (ADMELOG) corrective regimen sliding scale   SubCutaneous every 6 hours  ipratropium 17 MICROgram(s) HFA Inhaler 1 Puff(s) Inhalation every 6 hours  LORazepam     Tablet 1 milliGRAM(s) Oral every 6 hours  midodrine 10 milliGRAM(s) Oral every 8 hours  pantoprazole    Tablet 40 milliGRAM(s) Oral before breakfast  povidone iodine 10% Solution 1 Application(s) Topical daily  simethicone 80 milliGRAM(s) Chew every 12 hours    MEDICATIONS  (PRN):  acetaminophen     Tablet .. 650 milliGRAM(s) Oral every 6 hours PRN Temp greater or equal to 38C (100.4F), Mild Pain (1 - 3)  aluminum hydroxide/magnesium hydroxide/simethicone Suspension 30 milliLiter(s) Oral every 4 hours PRN Dyspepsia  dextrose Oral Gel 15 Gram(s) Oral once PRN Blood Glucose LESS THAN 70 milliGRAM(s)/deciliter  LORazepam   Injectable 1 milliGRAM(s) IV Push every 4 hours PRN Agitation  melatonin 3 milliGRAM(s) Oral at bedtime PRN Insomnia  ondansetron Injectable 4 milliGRAM(s) IV Push every 8 hours PRN Nausea and/or Vomiting      ALLERGIES: codeine (Hives)      FAMILY HISTORY:      Social history:  Alochol:   Smoking:   Drug Use:   Marital Status:     PHYSICAL EXAMINATION:  -----------------------------  T(C): 36.7 (10-30-22 @ 08:00), Max: 37.3 (10-29-22 @ 20:00)  HR: 70 (10-30-22 @ 08:00) (64 - 112)  BP: 120/80 (10-30-22 @ 08:00) (100/76 - 168/81)  RR: 18 (10-30-22 @ 08:00) (18 - 30)  SpO2: 97% (10-30-22 @ 08:00) (93% - 100%)  Wt(kg): --    10-29 @ 07:01  -  10-30 @ 07:00  --------------------------------------------------------  IN:    Free Water: 430 mL    Glucerna 1.5: 680 mL    Nepro with Carb Steady: 240 mL  Total IN: 1350 mL    OUT:    Indwelling Catheter - Urethral (mL): 450 mL  Total OUT: 450 mL    Total NET: 900 mL            Constitutional: well developed, normal appearance, well groomed, well nourished, no deformities and no acute distress.   Eyes: the conjunctiva exhibited no abnormalities and the eyelids demonstrated no xanthelasmas.   HEENT: normal oral mucosa, no oral pallor and no oral cyanosis.   Neck: normal jugular venous A waves present, normal jugular venous V waves present and no jugular venous obregon A waves.   Pulmonary: no respiratory distress, normal respiratory rhythm and effort, no accessory muscle use and lungs were clear to auscultation bilaterally. Anteriorly  Cardiovascular: heart rate and rhythm were normal, normal S1 and S2 and no murmur, gallop, rub, heave or thrill are present.    Musculoskeletal: the gait could not be assessed.   Extremities: no clubbing of the fingernails, no localized cyanosis, no petechial hemorrhages and no ischemic changes. B/L 1-Plus edema L>R  Skin: normal skin color and pigmentation, no rash, no venous stasis, no skin lesions, no skin ulcer and no xanthoma was observed.       LABS:   --------  10-30    135  |  102  |  24<H>  ----------------------------<  161<H>  4.2   |  26  |  0.87    Ca    8.4      30 Oct 2022 07:02  Mg     1.8     10-30    TPro  6.4  /  Alb  1.5<L>  /  TBili  0.3  /  DBili  x   /  AST  16  /  ALT  14  /  AlkPhos  128<H>  10-30                         7.4    7.09  )-----------( 157      ( 30 Oct 2022 07:02 )             25.0                   Radiology:

## 2022-10-30 NOTE — PROGRESS NOTE ADULT - SUBJECTIVE AND OBJECTIVE BOX
BREA BECKHAM     [facility]    Allergies    codeine (Hives)    Intolerances        PAST MEDICAL & SURGICAL HISTORY:  Dementia of frontal lobe type      Aphasic stroke      Diabetes mellitus      Respiratory failure      Hypertension      GERD (gastroesophageal reflux disease)      Constipation      Respiratory failure      CVA (cerebral vascular accident)      HTN (hypertension)      DM (diabetes mellitus)      Advanced dementia      COVID-19 virus detected      Quadriplegia      Pneumonia      Type II diabetes mellitus      Hx of appendectomy      Gastrostomy in place      Tracheostomy in place      Tracheostomy tube present      Feeding by G-tube          FAMILY HISTORY:      Home Medications:  albuterol 90 mcg/inh inhalation aerosol with adapter: 2  inhaled every 6 hours (18 Oct 2022 11:42)  Bacid (LAC) oral tablet: 2 tab(s) by gastrostomy tube once a day (18 Oct 2022 11:42)  Betadine 10% topical swab: cleanse right and left great toes (18 Oct 2022 11:42)  chlorhexidine 0.12% mucous membrane liquid: 15 milliliter(s) mucous membrane 2 times a day (18 Oct 2022 11:42)  Eucerin topical cream: Apply topically to affected area once a day bilateral feet (18 Oct 2022 11:42)  insulin glargine 100 units/mL subcutaneous solution: 8 unit(s) subcutaneous once a day (in the morning) (18 Oct 2022 11:42)  ipratropium-albuterol 0.5 mg-2.5 mg/3 mL inhalation solution: 3 milliliter(s) inhaled 4 times a day (18 Oct 2022 11:42)  LORazepam 1 mg oral tablet: 1 tab(s) by gastrostomy tube every 4 hours (18 Oct 2022 11:42)  methocarbamol 500 mg oral tablet: 1 tab(s) by gastrostomy tube 2 times a day (18 Oct 2022 11:42)  MiraLax oral powder for reconstitution: g-tube (18 Oct 2022 13:04)  Multiple Vitamins oral tablet: 1 tab(s) orally once a day (18 Oct 2022 11:42)  nystatin 100,000 units/g topical powder: 1 application topically 3 times a day (18 Oct 2022 11:42)  omeprazole 20 mg oral delayed release capsule: orally 2 times a day (18 Oct 2022 11:42)  polyethylene glycol 3350 oral powder for reconstitution: 17 gram(s) by gastrostomy tube every 12 hours (18 Oct 2022 11:42)  senna 8.6 mg oral tablet: 3 tab(s) by gastrostomy tube once a day (at bedtime) (18 Oct 2022 11:42)  simethicone 80 mg oral tablet: g-tube (18 Oct 2022 13:04)  simethicone 80 mg oral tablet, chewable: 1 tab(s) by gastrostomy tube every 6 hours (18 Oct 2022 11:42)  sucralfate 1 g/10 mL oral suspension: 10 milliliter(s) g-tube 4 times a day (before meals and at bedtime) (18 Oct 2022 13:04)  Tylenol 325 mg oral tablet: 2 tab(s) by gastrostomy tube once a day; 60 minutes prior to dressing change  (18 Oct 2022 11:42)  Tylenol 325 mg oral tablet: 2 tab(s) by gastrostomy tube every 6 hours, As Needed (18 Oct 2022 11:42)      MEDICATIONS  (STANDING):  ALBUTerol    90 MICROgram(s) HFA Inhaler 2 Puff(s) Inhalation every 6 hours  cadexomer iodine 0.9% Gel 1 Application(s) Topical <User Schedule>  cefTRIAXone   IVPB 1000 milliGRAM(s) IV Intermittent once  chlorhexidine 0.12% Liquid 15 milliLiter(s) Oral Mucosa every 12 hours  chlorhexidine 2% Cloths 1 Application(s) Topical two times a day  dextrose 5%. 1000 milliLiter(s) (100 mL/Hr) IV Continuous <Continuous>  dextrose 5%. 1000 milliLiter(s) (50 mL/Hr) IV Continuous <Continuous>  dextrose 50% Injectable 25 Gram(s) IV Push once  dextrose 50% Injectable 12.5 Gram(s) IV Push once  dextrose 50% Injectable 25 Gram(s) IV Push once  glucagon  Injectable 1 milliGRAM(s) IntraMuscular once  heparin   Injectable 5000 Unit(s) SubCutaneous every 12 hours  insulin glargine Injectable (LANTUS) 5 Unit(s) SubCutaneous every morning  insulin lispro (ADMELOG) corrective regimen sliding scale   SubCutaneous every 6 hours  ipratropium 17 MICROgram(s) HFA Inhaler 1 Puff(s) Inhalation every 6 hours  LORazepam     Tablet 1 milliGRAM(s) Oral every 6 hours  midodrine 10 milliGRAM(s) Oral every 8 hours  pantoprazole    Tablet 40 milliGRAM(s) Oral before breakfast  povidone iodine 10% Solution 1 Application(s) Topical daily  simethicone 80 milliGRAM(s) Chew every 12 hours    MEDICATIONS  (PRN):  acetaminophen     Tablet .. 650 milliGRAM(s) Oral every 6 hours PRN Temp greater or equal to 38C (100.4F), Mild Pain (1 - 3)  aluminum hydroxide/magnesium hydroxide/simethicone Suspension 30 milliLiter(s) Oral every 4 hours PRN Dyspepsia  dextrose Oral Gel 15 Gram(s) Oral once PRN Blood Glucose LESS THAN 70 milliGRAM(s)/deciliter  LORazepam   Injectable 1 milliGRAM(s) IV Push every 4 hours PRN Agitation  melatonin 3 milliGRAM(s) Oral at bedtime PRN Insomnia  ondansetron Injectable 4 milliGRAM(s) IV Push every 8 hours PRN Nausea and/or Vomiting      Diet, NPO with Tube Feed:   Tube Feeding Modality: Gastrostomy  Glucerna 1.5 Horacio  Total Volume for 24 Hours (mL): 960  Continuous  Starting Tube Feed Rate mL per Hour: 40  Until Goal Tube Feed Rate (mL per Hour): 40  Tube Feed Duration (in Hours): 24  Tube Feed Start Time: 14:00  Free Water Flush  Pump   Rate (mL per Hour): 30   Frequency: Every Hour    Duration (Hours): 24    Start Time: 14:00  Lucas(7 Gm Arginine/7 Gm Glut/1.2 Gm HMB     Qty per Day:  1 (10-29-22 @ 12:32) [Active]          Vital Signs Last 24 Hrs  T(C): 36.7 (30 Oct 2022 08:00), Max: 37.3 (29 Oct 2022 20:00)  T(F): 98 (30 Oct 2022 08:00), Max: 99.1 (29 Oct 2022 20:00)  HR: 70 (30 Oct 2022 08:00) (64 - 112)  BP: 120/80 (30 Oct 2022 08:00) (100/76 - 168/81)  BP(mean): 90 (30 Oct 2022 08:00) (77 - 106)  RR: 18 (30 Oct 2022 08:00) (18 - 30)  SpO2: 97% (30 Oct 2022 08:00) (93% - 100%)    Parameters below as of 30 Oct 2022 08:00  Patient On (Oxygen Delivery Method): ventilator    O2 Concentration (%): 40      10-29-22 @ 07:01  -  10-30-22 @ 07:00  --------------------------------------------------------  IN: 1350 mL / OUT: 450 mL / NET: 900 mL        Mode: AC/ CMV (Assist Control/ Continuous Mandatory Ventilation), RR (machine): 18, TV (machine): 400, FiO2: 40, PEEP: 5, ITime: 1, MAP: 12, PIP: 28      LABS:                        7.4    7.09  )-----------( 157      ( 30 Oct 2022 07:02 )             25.0     10-30    135  |  102  |  24<H>  ----------------------------<  161<H>  4.2   |  26  |  0.87    Ca    8.4      30 Oct 2022 07:02  Mg     1.8     10-30    TPro  6.4  /  Alb  1.5<L>  /  TBili  0.3  /  DBili  x   /  AST  16  /  ALT  14  /  AlkPhos  128<H>  10-30              WBC:  WBC Count: 7.09 K/uL (10-30 @ 07:02)  WBC Count: 10.02 K/uL (10-29 @ 06:44)  WBC Count: 8.11 K/uL (10-28 @ 06:15)  WBC Count: 10.05 K/uL (10-27 @ 06:00)      MICROBIOLOGY:  RECENT CULTURES:                  Sodium:  Sodium, Serum: 135 mmol/L (10-30 @ 07:02)  Sodium, Serum: 134 mmol/L (10-29 @ 06:44)  Sodium, Serum: 138 mmol/L (10-28 @ 06:15)  Sodium, Serum: 141 mmol/L (10-27 @ 06:00)      0.87 mg/dL 10-30 @ 07:02  0.86 mg/dL 10-29 @ 06:44  0.96 mg/dL 10-28 @ 06:15  0.97 mg/dL 10-27 @ 06:00      Hemoglobin:  Hemoglobin: 7.4 g/dL (10-30 @ 07:02)  Hemoglobin: 8.3 g/dL (10-29 @ 06:44)  Hemoglobin: 7.6 g/dL (10-28 @ 06:15)  Hemoglobin: 8.0 g/dL (10-27 @ 06:00)      Platelets: Platelet Count - Automated: 157 K/uL (10-30 @ 07:02)  Platelet Count - Automated: 169 K/uL (10-29 @ 06:44)  Platelet Count - Automated: 158 K/uL (10-28 @ 06:15)  Platelet Count - Automated: 178 K/uL (10-27 @ 06:00)      LIVER FUNCTIONS - ( 30 Oct 2022 07:02 )  Alb: 1.5 g/dL / Pro: 6.4 g/dL / ALK PHOS: 128 U/L / ALT: 14 U/L DA / AST: 16 U/L / GGT: x                 RADIOLOGY & ADDITIONAL STUDIES:      MICROBIOLOGY:  RECENT CULTURES:

## 2022-10-30 NOTE — PROGRESS NOTE ADULT - ASSESSMENT
REVIEW OF SYMPTOMS      Able to give (reliable) ROS  NO     PHYSICAL EXAM    HEENT Unremarkable  atraumatic   RESP Fair air entry EXP prolonged    Harsh breath sound Resp distres mild   CARDIAC S1 S2 No S3     NO JVD    ABDOMEN SOFT BS PRESENT NOT DISTENDED No hepatosplenomegaly   PEDAL EDEMA present No calf tenderness  NO rash       GENERAL DATA .   GOC.  10/18/2022 full code       ALLGY. codeine                            WT.          10/18/2022 48  BMI.          10/18/2022 17                     ICU STAY. 10/18/2022  COVID.  10/18/2022 scv2 (-)     BEST PRACTICE ISSUES.    HOB ELEVATN. Yes  DVT PPLX.   10/18/2022 hpsc    SQUIRES PPLX.  10/18/2022 protonix 40     INFN PPLX.    10/18/2022 ch;lorhexidine .12%  10/19/2022 chlorhexidine 2%    SP SW EM.        DIET.     10/19/2022 nepro 800 gt    VS/ PO/IO/ VENT/ DRIPS.  10/30/2022 afeb 72 110/60   10/30/2022 18/400/5/.4     ASSESSMENT/RECOMMENDATIONS .   RESP.  -- Gas exchange.   10/18/2022 ac 18/400/100/5 751/37/257  -- VENT MANAGEMENT.   HOB elevation  Target Pplat 30 (-)  Target PO 90-95%  Target pH 730 (+)  Daily spontaneous breathing trials   Daily sedation vacation   -- Pleuralk effsn  Past ritchie   R THORAC   6/8/2022  g 161 l 222 p 4.9 p 10 l 16 .38    L THORAC   5/25/2022 g 183 l 144/381 .37 p 3.4/5.3 .64 p 23 l 36   5/25/2022l pl fluid  lymph pred exudate   cyto (-)     Ct  10/18/2022 sm r mod l effs large subpulmonic component   a/r No thoracentesis plannd at this point risk prohibitive in vent pt   -- Mediastinal lne.  Ct  10/18/2022 again enlarged mediastinal lns likely reactive   a/r will need followup with ct in 2m and consider biopsy if persists   -- wheeze.  10/18 albuterol hf  10/18 atrovent hf   INFECTION.  PAST ID ISSUES   8/19/2022  zosyn Se Vignesh  8/19/2022 bactrim ds 2 bid   pmh 5/2/2022 SERRATIA CARBAPENEM RESISTANT PSEUDOMONAS  -- VAP 10/18/2022.  -- UTI 10/18/2022   w 10/18-10/19-10/20-10/21-10/23-10/25-10/26-10/27-10/28/2022 w 13 - 8.8 - 15-10.3 - 9.4- 12.3 - 15.6- 11.6  - 10 - 8   pr 10/20-10/27/2022 6.6  - .4   ua 10/18/2022 w 11-25   ct ch 10/18/2022 cw 8/18/20232 ggo post upper lobes with interlobular septal thickening infiltrate or atelectasis lower lobes l more than   sm r and sm to mod l effsn  rvp 10/18/2022 (-)   uc 10/18 100K E coli   sp 10/19 Stenotrophomonas   bc 10/18 (-)   10/18/2022 meropenem Dr NEWTON  dc  10/18/2022 ID Dr Brantley on case   10/19-10/26/2022 levaquin 750 x 7 d   10/21/2022 rocephin x 10d Dr BRITTANY Brantley      CARDIAC.  -- Hypotension.  10/18/2022 88/48   echo 8/19/2022 n lvsf dd1 pasp 58   10/20 midocrine 10.3   Target MAP 65 (+)   resolvd   GI.  -- ELEVATED LFTS.  LFTS 10/18-10/19-10/20/2022   ap 140-131- 104  ast - 44  alt 21 - 103 - 64   monitor  HEMAT.  -- Anemia.  Hb 10/18-10/19-10/20-10/21-10/22-10/23-10/24-10/25-10/26-10/27-10/28-10/29-10/30/2022 Hb 7.4 -  6.3 - 8.2  - 7.4 - 8 - 8.7 - 8.4 - 8.8 - 7.7 - 8 - 7.8 - 8.3- 7.4  inr 10/18/2022 inr 1.23   ctap 10/20 No rp bl  Monitor  target Hb 7 (+)   -- TRANSFUSION  10/19/2022 2 U PRBC     OVERALL ASSESSMENT/DISPOSITION.  76 f PMH trach peg cva cac anoxic encephalo PH 8/19/2022 pasp 58 bl pl effs  r thora 6/8/2022 and l thora 5/25/2022 were lp exudates  recurrent hospitalizations HO CR psuedomonas 5/2/2022 zosyn 8/19/2022 admitted 10/18/2022 with resp distress     VAP   UTI   uc 10/18 100K E coli   sp 10/19 Stenotrophomonas   10/19-10/26/2022 levaquin 750   10/21/2022 rocephin x 7d Dr BRITTANY Brantley   Vent mgmt   Hypotension  10/20 midocrine 10.3  Anemia  10/19/2022 2 U PRBC   Woody 10/23/2022      TIME SPENT   Over 39 minutes aggregate critical care time spent on encounter; activities included   direct patient care, counseling and/or coordinating care reviewing notes, lab data/ imaging , discussion with multidisciplinary team/ patient  /family and explaining in detail risks, benefits, alternatives  of the recommendations     CHAPINCITO GUIDRY 76 f [facility] S 10/18/2022   DR ANG PADGETT

## 2022-10-30 NOTE — PROGRESS NOTE ADULT - ASSESSMENT
Assessment   75 y/o F with PMHx of dementia, COPD, VDRF s/p trach peg, cardiac arrest, CVA, CKD, prior covid, nonverbal at baseline admitted on 10/18 for acute on chronic hypoxic respiratory failure due to pna with hospital course complicated by chronic anemia, RYAN, hyperkalemia, hyperglycemia, hyponatremia s/p 2 prbc. CT with bilateral moderate pleural effusions. Repeat CT showed stable/ slightly smaller pleural effusions, no indication for thoracentesis at this time. Was admitted for treatment of suspected PNA s/p levofloxacin . Remains vented on 18/400/40%/5. Ceftriaxone for UTI. Pt remains in [facility] for treatment of   1. Acute on chronic Hypoxic RF  2. UTI    Plan  Neuro: Standing Ativan q 6 and PRN for agitation vent compliance  Cardio: Midodrine 10 mg q 8. Maintain MAP > 65. Cardio consult appreciated   Pulm: On 18/400/40%/5, Actively titrating Fio2 to maintain MAP > 65. Vent bundle in place. Continue nebs. Pulm consult appreciated  GI: NPO with tube feeds. GI consult appreciated   Renal: Scr stable. Trend Scr and lytes and replete as needed.  ID; Afebrile, WBC down trending. BC NGTD. Ucx grew ECOLI (10/18), continue Ceftriaxone. ID consult appreciated   Heme: Hep subQ DVT prohpylaxis  Endo: No active issues  Dispo: Remains in [facility] care. Cotinue treatment of Ecoli UTI and acute on chronic hypoxic RF

## 2022-10-30 NOTE — PROGRESS NOTE ADULT - SUBJECTIVE AND OBJECTIVE BOX
Patient is a 76y old  Female who presents with a chief complaint of Acute on chronic hypoxemic respiratory failure.        INTERVAL HPI/OVERNIGHT EVENTS: Pt is nonverbal and cannot provide ROS. No significant acute events overnight.     MEDICATIONS  (STANDING):  ALBUTerol    90 MICROgram(s) HFA Inhaler 2 Puff(s) Inhalation every 6 hours  cadexomer iodine 0.9% Gel 1 Application(s) Topical <User Schedule>  chlorhexidine 0.12% Liquid 15 milliLiter(s) Oral Mucosa every 12 hours  chlorhexidine 2% Cloths 1 Application(s) Topical two times a day  dextrose 5%. 1000 milliLiter(s) (100 mL/Hr) IV Continuous <Continuous>  dextrose 5%. 1000 milliLiter(s) (50 mL/Hr) IV Continuous <Continuous>  dextrose 50% Injectable 25 Gram(s) IV Push once  dextrose 50% Injectable 12.5 Gram(s) IV Push once  dextrose 50% Injectable 25 Gram(s) IV Push once  glucagon  Injectable 1 milliGRAM(s) IntraMuscular once  heparin   Injectable 5000 Unit(s) SubCutaneous every 12 hours  insulin glargine Injectable (LANTUS) 5 Unit(s) SubCutaneous every morning  insulin lispro (ADMELOG) corrective regimen sliding scale   SubCutaneous every 6 hours  ipratropium 17 MICROgram(s) HFA Inhaler 1 Puff(s) Inhalation every 6 hours  LORazepam     Tablet 1 milliGRAM(s) Oral every 6 hours  midodrine 10 milliGRAM(s) Oral every 8 hours  pantoprazole    Tablet 40 milliGRAM(s) Oral before breakfast  povidone iodine 10% Solution 1 Application(s) Topical daily  simethicone 80 milliGRAM(s) Chew every 12 hours    MEDICATIONS  (PRN):  acetaminophen     Tablet .. 650 milliGRAM(s) Oral every 6 hours PRN Temp greater or equal to 38C (100.4F), Mild Pain (1 - 3)  aluminum hydroxide/magnesium hydroxide/simethicone Suspension 30 milliLiter(s) Oral every 4 hours PRN Dyspepsia  dextrose Oral Gel 15 Gram(s) Oral once PRN Blood Glucose LESS THAN 70 milliGRAM(s)/deciliter  LORazepam   Injectable 1 milliGRAM(s) IV Push every 4 hours PRN Agitation  melatonin 3 milliGRAM(s) Oral at bedtime PRN Insomnia  ondansetron Injectable 4 milliGRAM(s) IV Push every 8 hours PRN Nausea and/or Vomiting        Allergies    codeine (Hives)    Intolerances        REVIEW OF SYSTEMS:  Pt is nonverbal and cannot provide ROS.    Vital Signs Last 24 Hrs  T(C): 37.3 (30 Oct 2022 16:27), Max: 37.3 (29 Oct 2022 20:00)  T(F): 99.1 (30 Oct 2022 16:27), Max: 99.1 (29 Oct 2022 20:00)  HR: 104 (30 Oct 2022 18:00) (64 - 112)  BP: 103/66 (30 Oct 2022 18:00) (100/76 - 168/81)  BP(mean): 77 (30 Oct 2022 18:00) (77 - 106)  RR: 26 (30 Oct 2022 18:00) (17 - 31)  SpO2: 94% (30 Oct 2022 18:00) (94% - 100%)    Parameters below as of 30 Oct 2022 18:00  Patient On (Oxygen Delivery Method): ventilator        PHYSICAL EXAM:  GENERAL: chronically ill-appearing, contracted  HEENT:  anicteric, dry mucous membranes, mouth frequently open at baseline ; trach with vent  CHEST/LUNG:  decreased breath sounds in lower lung fields  HEART:  RRR, S1, S2  ABDOMEN:  +BS, soft, no apparent tenderness, distended, PEG in place  EXTREMITIES: UEs contracted; no cyanosis or apparent calf tenderness; 1+ LE pitting edema b/l  NERVOUS SYSTEM: cannot answer questions and does not follow commands at baseline    LABS:                                   7.4    7.09  )-----------( 157      ( 30 Oct 2022 07:02 )             25.0     CBC Full  -  ( 30 Oct 2022 07:02 )  WBC Count : 7.09 K/uL  Hemoglobin : 7.4 g/dL  Hematocrit : 25.0 %  Platelet Count - Automated : 157 K/uL  Mean Cell Volume : 91.6 fl  Mean Cell Hemoglobin : 27.1 pg  Mean Cell Hemoglobin Concentration : 29.6 gm/dL  Auto Neutrophil # : x  Auto Lymphocyte # : x  Auto Monocyte # : x  Auto Eosinophil # : x  Auto Basophil # : x  Auto Neutrophil % : x  Auto Lymphocyte % : x  Auto Monocyte % : x  Auto Eosinophil % : x  Auto Basophil % : x    10-30    135  |  102  |  24<H>  ----------------------------<  161<H>  4.2   |  26  |  0.87    Ca    8.4      30 Oct 2022 07:02  Mg     1.8     10-30    TPro  6.4  /  Alb  1.5<L>  /  TBili  0.3  /  DBili  x   /  AST  16  /  ALT  14  /  AlkPhos  128<H>  10-30          CAPILLARY BLOOD GLUCOSE      POCT Blood Glucose.: 152 mg/dL (30 Oct 2022 17:39)  POCT Blood Glucose.: 170 mg/dL (30 Oct 2022 11:21)  POCT Blood Glucose.: 154 mg/dL (30 Oct 2022 06:30)  POCT Blood Glucose.: 202 mg/dL (30 Oct 2022 00:28)        RADIOLOGY & ADDITIONAL TESTS:    Personally reviewed.     Consultant(s) Notes Reviewed:  [x] YES  [ ] NO

## 2022-10-30 NOTE — PROGRESS NOTE ADULT - SUBJECTIVE AND OBJECTIVE BOX
Date/Time Patient Seen:  		  Referring MD:   Data Reviewed	       Patient is a 76y old  Female who presents with a chief complaint of Acute on chronic hypoxemic respiratory failure (29 Oct 2022 19:21)      Subjective/HPI     PAST MEDICAL & SURGICAL HISTORY:  Dementia of frontal lobe type    Aphasic stroke    Diabetes mellitus    Respiratory failure    Hypertension    GERD (gastroesophageal reflux disease)    Constipation    Respiratory failure    CVA (cerebral vascular accident)    HTN (hypertension)    DM (diabetes mellitus)    Advanced dementia    COVID-19 virus detected    Quadriplegia    Pneumonia    Type II diabetes mellitus    Hx of appendectomy    Gastrostomy in place    Tracheostomy in place    Tracheostomy tube present    Feeding by G-tube          Medication list         MEDICATIONS  (STANDING):  ALBUTerol    90 MICROgram(s) HFA Inhaler 2 Puff(s) Inhalation every 6 hours  cadexomer iodine 0.9% Gel 1 Application(s) Topical <User Schedule>  cefTRIAXone   IVPB 1000 milliGRAM(s) IV Intermittent every 24 hours  cefTRIAXone   IVPB      chlorhexidine 0.12% Liquid 15 milliLiter(s) Oral Mucosa every 12 hours  chlorhexidine 2% Cloths 1 Application(s) Topical two times a day  dextrose 5%. 1000 milliLiter(s) (50 mL/Hr) IV Continuous <Continuous>  dextrose 5%. 1000 milliLiter(s) (100 mL/Hr) IV Continuous <Continuous>  dextrose 50% Injectable 25 Gram(s) IV Push once  dextrose 50% Injectable 12.5 Gram(s) IV Push once  dextrose 50% Injectable 25 Gram(s) IV Push once  glucagon  Injectable 1 milliGRAM(s) IntraMuscular once  heparin   Injectable 5000 Unit(s) SubCutaneous every 12 hours  insulin glargine Injectable (LANTUS) 5 Unit(s) SubCutaneous every morning  insulin lispro (ADMELOG) corrective regimen sliding scale   SubCutaneous every 6 hours  ipratropium 17 MICROgram(s) HFA Inhaler 1 Puff(s) Inhalation every 6 hours  LORazepam     Tablet 1 milliGRAM(s) Oral every 6 hours  midodrine 10 milliGRAM(s) Oral every 8 hours  pantoprazole    Tablet 40 milliGRAM(s) Oral before breakfast  povidone iodine 10% Solution 1 Application(s) Topical daily  simethicone 80 milliGRAM(s) Chew every 12 hours    MEDICATIONS  (PRN):  acetaminophen     Tablet .. 650 milliGRAM(s) Oral every 6 hours PRN Temp greater or equal to 38C (100.4F), Mild Pain (1 - 3)  aluminum hydroxide/magnesium hydroxide/simethicone Suspension 30 milliLiter(s) Oral every 4 hours PRN Dyspepsia  dextrose Oral Gel 15 Gram(s) Oral once PRN Blood Glucose LESS THAN 70 milliGRAM(s)/deciliter  LORazepam   Injectable 1 milliGRAM(s) IV Push every 4 hours PRN Agitation  melatonin 3 milliGRAM(s) Oral at bedtime PRN Insomnia  ondansetron Injectable 4 milliGRAM(s) IV Push every 8 hours PRN Nausea and/or Vomiting         Vitals log        ICU Vital Signs Last 24 Hrs  T(C): 37.1 (30 Oct 2022 04:57), Max: 37.3 (29 Oct 2022 20:00)  T(F): 98.7 (30 Oct 2022 04:57), Max: 99.1 (29 Oct 2022 20:00)  HR: 71 (30 Oct 2022 04:00) (64 - 112)  BP: 107/64 (30 Oct 2022 04:00) (100/76 - 168/81)  BP(mean): 77 (30 Oct 2022 04:00) (76 - 106)  ABP: --  ABP(mean): --  RR: 20 (30 Oct 2022 04:00) (15 - 30)  SpO2: 100% (30 Oct 2022 04:00) (93% - 100%)    O2 Parameters below as of 30 Oct 2022 04:00  Patient On (Oxygen Delivery Method): ventilator    O2 Concentration (%): 40         Mode: AC/ CMV (Assist Control/ Continuous Mandatory Ventilation)  RR (machine): 18  TV (machine): 400  FiO2: 40  PEEP: 5  ITime: 1  MAP: 18  PIP: 34      Input and Output:  I&O's Detail    28 Oct 2022 07:01  -  29 Oct 2022 07:00  --------------------------------------------------------  IN:    Free Water: 840 mL    Nepro with Carb Steady: 960 mL  Total IN: 1800 mL    OUT:    Indwelling Catheter - Urethral (mL): 1000 mL  Total OUT: 1000 mL    Total NET: 800 mL      29 Oct 2022 07:01  -  30 Oct 2022 05:59  --------------------------------------------------------  IN:    Free Water: 370 mL    Glucerna 1.5: 600 mL    Nepro with Carb Steady: 240 mL  Total IN: 1210 mL    OUT:    Indwelling Catheter - Urethral (mL): 450 mL  Total OUT: 450 mL    Total NET: 760 mL          Lab Data                        8.3    10.02 )-----------( 169      ( 29 Oct 2022 06:44 )             28.7     10-29    134<L>  |  102  |  24<H>  ----------------------------<  164<H>  4.1   |  26  |  0.86    Ca    8.9      29 Oct 2022 06:44  Phos  4.2     10-28  Mg     1.9     10-29              Review of Systems	      Objective     Physical Examination    heart s1s2  lung dec BS  head nc      Pertinent Lab findings & Imaging      Annalise:  NO   Adequate UO     I&O's Detail    28 Oct 2022 07:01  -  29 Oct 2022 07:00  --------------------------------------------------------  IN:    Free Water: 840 mL    Nepro with Carb Steady: 960 mL  Total IN: 1800 mL    OUT:    Indwelling Catheter - Urethral (mL): 1000 mL  Total OUT: 1000 mL    Total NET: 800 mL      29 Oct 2022 07:01  -  30 Oct 2022 05:59  --------------------------------------------------------  IN:    Free Water: 370 mL    Glucerna 1.5: 600 mL    Nepro with Carb Steady: 240 mL  Total IN: 1210 mL    OUT:    Indwelling Catheter - Urethral (mL): 450 mL  Total OUT: 450 mL    Total NET: 760 mL               Discussed with:     Cultures:	        Radiology

## 2022-10-30 NOTE — PROGRESS NOTE ADULT - TIME BILLING
Note written by attending, see above.  Time spent: 37min on critically ill pt on vent with respiratory failure and sepsis and diabetes management. Titrating vent.
Note written by attending, see above.  Time spent: 37min on critically ill pt on vent with respiratory failure and sepsis and diabetes management.
Note written by attending, see above.  Time spent: 37min on critically ill pt on vent with respiratory failure and sepsis.

## 2022-10-30 NOTE — PROGRESS NOTE ADULT - SUBJECTIVE AND OBJECTIVE BOX
CC:  Patient is a 76y old  Female who presents with a chief complaint of Acute on chronic hypoxemic respiratory failure (30 Oct 2022 18:59)      HPI/BRIEF HOSPITAL COURSE:   75yo F with PMH of dementia, COPD with chronic resp failure and is vent dependent, s/p PEG, hx of cardiac arrest, diastolic CHF, cor pulmonale, hx of CVA, COVID-19 infxn, CKD, anemia, multiple admissions for respiratory failure (recent admissions in 06/2022 and 08/2022 diagnosed with PNA with parapneumonic pleural effusion s/p thoracentesis and Avycaz) who presents from [facility] for respiratory distress again a/w sepsis and acute on chronic hypoxic respiratory failure due to suspected recurrent VAP.      Events last 24 hours:   No active issues over night. Remains on 18/400/40%/5. On Ceftiaxone for UTI.     PAST MEDICAL & SURGICAL HISTORY:  Dementia of frontal lobe type      Aphasic stroke      Diabetes mellitus      Respiratory failure      Hypertension      GERD (gastroesophageal reflux disease)      Constipation      Respiratory failure      CVA (cerebral vascular accident)      HTN (hypertension)      DM (diabetes mellitus)      Advanced dementia      COVID-19 virus detected      Quadriplegia      Pneumonia      Type II diabetes mellitus      Hx of appendectomy      Gastrostomy in place      Tracheostomy in place      Tracheostomy tube present      Feeding by G-tube        Allergies    codeine (Hives)    Intolerances      FAMILY HISTORY:      Review of Systems:  Negative 2/2 to cognition     Medications:    midodrine 10 milliGRAM(s) Oral every 8 hours    ALBUTerol    90 MICROgram(s) HFA Inhaler 2 Puff(s) Inhalation every 6 hours  ipratropium 17 MICROgram(s) HFA Inhaler 1 Puff(s) Inhalation every 6 hours    acetaminophen     Tablet .. 650 milliGRAM(s) Oral every 6 hours PRN  LORazepam     Tablet 1 milliGRAM(s) Oral every 6 hours  LORazepam   Injectable 1 milliGRAM(s) IV Push every 4 hours PRN  melatonin 3 milliGRAM(s) Oral at bedtime PRN  ondansetron Injectable 4 milliGRAM(s) IV Push every 8 hours PRN      heparin   Injectable 5000 Unit(s) SubCutaneous every 12 hours    aluminum hydroxide/magnesium hydroxide/simethicone Suspension 30 milliLiter(s) Oral every 4 hours PRN  pantoprazole    Tablet 40 milliGRAM(s) Oral before breakfast  simethicone 80 milliGRAM(s) Chew every 12 hours      dextrose 50% Injectable 25 Gram(s) IV Push once  dextrose 50% Injectable 12.5 Gram(s) IV Push once  dextrose 50% Injectable 25 Gram(s) IV Push once  dextrose Oral Gel 15 Gram(s) Oral once PRN  glucagon  Injectable 1 milliGRAM(s) IntraMuscular once  insulin glargine Injectable (LANTUS) 5 Unit(s) SubCutaneous every morning  insulin lispro (ADMELOG) corrective regimen sliding scale   SubCutaneous every 6 hours    dextrose 5%. 1000 milliLiter(s) IV Continuous <Continuous>  dextrose 5%. 1000 milliLiter(s) IV Continuous <Continuous>      cadexomer iodine 0.9% Gel 1 Application(s) Topical <User Schedule>  chlorhexidine 0.12% Liquid 15 milliLiter(s) Oral Mucosa every 12 hours  chlorhexidine 2% Cloths 1 Application(s) Topical two times a day  povidone iodine 10% Solution 1 Application(s) Topical daily        Mode: AC/ CMV (Assist Control/ Continuous Mandatory Ventilation)  RR (machine): 18  TV (machine): 400  FiO2: 40  PEEP: 5  ITime: 1  MAP: 17  PIP: 29              Vital Signs Last 24 Hrs  T(C): 37.3 (30 Oct 2022 16:27), Max: 37.3 (29 Oct 2022 20:00)  T(F): 99.1 (30 Oct 2022 16:27), Max: 99.1 (29 Oct 2022 20:00)  HR: 104 (30 Oct 2022 18:00) (64 - 112)  BP: 103/66 (30 Oct 2022 18:00) (100/76 - 168/81)  BP(mean): 77 (30 Oct 2022 18:00) (77 - 106)  RR: 26 (30 Oct 2022 18:00) (17 - 31)  SpO2: 94% (30 Oct 2022 18:00) (94% - 100%)    Parameters below as of 30 Oct 2022 20:00  Patient On (Oxygen Delivery Method): ventilator    O2 Concentration (%): 40        I&O's Detail    29 Oct 2022 07:01  -  30 Oct 2022 07:00  --------------------------------------------------------  IN:    Free Water: 430 mL    Glucerna 1.5: 720 mL    Nepro with Carb Steady: 240 mL  Total IN: 1390 mL    OUT:    Indwelling Catheter - Urethral (mL): 930 mL  Total OUT: 930 mL    Total NET: 460 mL      30 Oct 2022 07:01  -  30 Oct 2022 19:46  --------------------------------------------------------  IN:    Free Water: 100 mL    Glucerna 1.5: 480 mL  Total IN: 580 mL    OUT:  Total OUT: 0 mL    Total NET: 580 mL            LABS:                        7.4    7.09  )-----------( 157      ( 30 Oct 2022 07:02 )             25.0     10-30    135  |  102  |  24<H>  ----------------------------<  161<H>  4.2   |  26  |  0.87    Ca    8.4      30 Oct 2022 07:02  Mg     1.8     10-30    TPro  6.4  /  Alb  1.5<L>  /  TBili  0.3  /  DBili  x   /  AST  16  /  ALT  14  /  AlkPhos  128<H>  10-30          CAPILLARY BLOOD GLUCOSE      POCT Blood Glucose.: 152 mg/dL (30 Oct 2022 17:39)        CULTURES:        Physical Examination:  General: Vented/ PRN sedation  NEURO: Vented. CN2-12 intact.  HEENT: Pupils equal, reactive to light.  Symmetric.  PULM: CTA BL, no significant sputum production, no wheezes, rales, rhonchi  CVS: Regular rate and rhythm, no murmurs, rubs, or gallops  ABD: Soft, nondistended, nontender, normoactive bowel sounds, no masses  EXT: No edema, nontender  SKIN: Warm and well perfused, no rashes noted        RADIOLOGY:   < from: CT Chest No Cont (10.18.22 @ 13:13) >    IMPRESSION: Groundglass opacities and interlobular septal thickening   suggestive of CHF. There are also some more confluent areas of density   which may represent atelectasis versus infiltrates.  Bilateral pleural effusions have decreased from the prior exam  Tracheostomy and gastrostomy    --- End of Report ---        < end of copied text >

## 2022-10-30 NOTE — PROGRESS NOTE ADULT - ASSESSMENT
Pt is a 76W w/ PMHx of DM2, COPD, HTN, dementia, hx of subarachnoid hemorrhage, and history of chronic trach brought in by ambulance for evaluation of low CO2 and cyanotic appearance.   Well-known and has had multiple admissions for Acute on chronic RF and PNA w/ MDROs    Acute VAP/PNA/Acute on chronic HF/CAUTI/UTI  - pt p/w cyanosis  - most recent admission in end of August, most recent cx w/ Pseudomonas and Stenotrophomonas  - CT chest Groundglass opacities and interlobular septal thickening suggestive of CHF. There are also some more confluent areas of density   which may represent atelectasis versus infiltrates.Bilateral pleural effusions have decreased from the prior exam  - UCx E.coli   - BCx NGTD  - sputum cx w/ Stenotrophomonas maltophilia  Plan:   S/p levofloxacin x7 day course, completed PNA tx  ceftriaxone 1gm q24h (started 10/210 so last dose today for 10 day course     Trend temps and cbc--leukocytosis resolved  Pulmonary toilet  Isolation per infection control policy given hx of CRE  Supportive care/vent management per CCM    Thank you for consulting us and involving us in the management of this most interesting and challenging case.  We will follow along in the care of this patient. Please call us at [phone] or text me directly on my cell# at [phone] with any concerns.

## 2022-10-30 NOTE — PROGRESS NOTE ADULT - SUBJECTIVE AND OBJECTIVE BOX
[facility] DIVISION of INFECTIOUS DISEASE  Arturo Olivas MD PhD, Haylee Umanzor MD, Andressa Brantley MD, Billy Valenzuela MD, Emil Medellin MD  and providing coverage with Eusebio Chairez MD  Providing Infectious Disease Consultations at [facility], [facility], [facility], [facility]'s    Office# [phone] to schedule follow up appointments  Answering Service for urgent calls or New Consults [phone]  Cell# to text for urgent issues Arturo Olivas [phone]     infectious diseases progress note:    BREA BECKHAM is a 76y y. o. Female patient    Overnight and events of the last 24hrs reviewed    Allergies    codeine (Hives)    Intolerances        ANTIBIOTICS/RELEVANT:  antimicrobials  cefTRIAXone   IVPB      cefTRIAXone   IVPB 1000 milliGRAM(s) IV Intermittent every 24 hours    immunologic:    OTHER:  acetaminophen     Tablet .. 650 milliGRAM(s) Oral every 6 hours PRN  ALBUTerol    90 MICROgram(s) HFA Inhaler 2 Puff(s) Inhalation every 6 hours  aluminum hydroxide/magnesium hydroxide/simethicone Suspension 30 milliLiter(s) Oral every 4 hours PRN  cadexomer iodine 0.9% Gel 1 Application(s) Topical <User Schedule>  chlorhexidine 0.12% Liquid 15 milliLiter(s) Oral Mucosa every 12 hours  chlorhexidine 2% Cloths 1 Application(s) Topical two times a day  dextrose 5%. 1000 milliLiter(s) IV Continuous <Continuous>  dextrose 5%. 1000 milliLiter(s) IV Continuous <Continuous>  dextrose 50% Injectable 25 Gram(s) IV Push once  dextrose 50% Injectable 12.5 Gram(s) IV Push once  dextrose 50% Injectable 25 Gram(s) IV Push once  dextrose Oral Gel 15 Gram(s) Oral once PRN  glucagon  Injectable 1 milliGRAM(s) IntraMuscular once  heparin   Injectable 5000 Unit(s) SubCutaneous every 12 hours  insulin glargine Injectable (LANTUS) 5 Unit(s) SubCutaneous every morning  insulin lispro (ADMELOG) corrective regimen sliding scale   SubCutaneous every 6 hours  ipratropium 17 MICROgram(s) HFA Inhaler 1 Puff(s) Inhalation every 6 hours  LORazepam     Tablet 1 milliGRAM(s) Oral every 6 hours  LORazepam   Injectable 1 milliGRAM(s) IV Push every 4 hours PRN  melatonin 3 milliGRAM(s) Oral at bedtime PRN  midodrine 10 milliGRAM(s) Oral every 8 hours  ondansetron Injectable 4 milliGRAM(s) IV Push every 8 hours PRN  pantoprazole    Tablet 40 milliGRAM(s) Oral before breakfast  povidone iodine 10% Solution 1 Application(s) Topical daily  simethicone 80 milliGRAM(s) Chew every 12 hours      Objective:  Vital Signs Last 24 Hrs  T(C): 36.7 (30 Oct 2022 08:00), Max: 37.3 (29 Oct 2022 20:00)  T(F): 98 (30 Oct 2022 08:00), Max: 99.1 (29 Oct 2022 20:00)  HR: 70 (30 Oct 2022 08:00) (64 - 112)  BP: 120/80 (30 Oct 2022 08:00) (100/76 - 168/81)  BP(mean): 90 (30 Oct 2022 08:00) (77 - 106)  RR: 18 (30 Oct 2022 08:00) (18 - 30)  SpO2: 97% (30 Oct 2022 08:00) (93% - 100%)    Parameters below as of 30 Oct 2022 08:00  Patient On (Oxygen Delivery Method): ventilator    O2 Concentration (%): 40    T(C): 36.7 (10-30-22 @ 08:00), Max: 37.3 (10-29-22 @ 20:00)  T(C): 36.7 (10-30-22 @ 08:00), Max: 37.7 (10-27-22 @ 12:44)  T(C): 36.7 (10-30-22 @ 08:00), Max: 37.7 (10-27-22 @ 12:44)    PHYSICAL EXAM: remains on Vent  HEENT: NC atraumatic  Neck: supple  Respiratory: no accessory muscle use, breathing comfortably  Cardiovascular: distant  Gastrointestinal: normal appearing, nondistended  Extremities: no clubbing, no cyanosis,    Mode: AC/ CMV, RR (machine): 18, TV (machine): 400, FiO2: 40, PEEP: 5, ITime: 1, MAP: 12, PIP: 28    LABS:                          7.4    7.09  )-----------( 157      ( 30 Oct 2022 07:02 )             25.0       WBC  7.09 10-30 @ 07:02  10.02 10-29 @ 06:44  8.11 10-28 @ 06:15  10.05 10-27 @ 06:00  11.65 10-26 @ 06:23  15.63 10-25 @ 06:37  13.05 10-24 @ 07:04      10-30    135  |  102  |  24<H>  ----------------------------<  161<H>  4.2   |  26  |  0.87    Ca    8.4      30 Oct 2022 07:02  Mg     1.8     10-30    TPro  6.4  /  Alb  1.5<L>  /  TBili  0.3  /  DBili  x   /  AST  16  /  ALT  14  /  AlkPhos  128<H>  10-30      Creatinine, Serum: 0.87 mg/dL (10-30-22 @ 07:02)  Creatinine, Serum: 0.86 mg/dL (10-29-22 @ 06:44)  Creatinine, Serum: 0.96 mg/dL (10-28-22 @ 06:15)  Creatinine, Serum: 0.97 mg/dL (10-27-22 @ 06:00)  Creatinine, Serum: 0.94 mg/dL (10-26-22 @ 06:23)  Creatinine, Serum: 1.01 mg/dL (10-25-22 @ 06:37)  Creatinine, Serum: 1.13 mg/dL (10-24-22 @ 07:04)                INFLAMMATORY MARKERS      MICROBIOLOGY:              RADIOLOGY & ADDITIONAL STUDIES:

## 2022-10-30 NOTE — PROGRESS NOTE ADULT - ASSESSMENT
The patient is a 76 year old female with a history of HTN, DM, CVA, dementia, chronic respiratory failure s/p trach, PEG, cardiac arrest, anemia, pleural effusions who presents with hypoxia.     10/30/22  Seen at [facility] ICU  Lying flat, sleeping comfortably    Plan:  - ECG with no evidence of ischemia or infarction  - Echo 8/22 with normal LV systolic function, mod pulm HTN, IVC small/collapsible  - CT chest with small bilateral pleural effusions, GGO, and infiltrates  - Pleural effusions previously were exudative, likely parapneumonic  - Continue midodrine 10 mg tid  - IV antibiotics-rocephin  - Follow labs The patient is a 76 year old female with a history of HTN, DM, CVA, dementia, chronic respiratory failure s/p trach, PEG, cardiac arrest, anemia, pleural effusions who presents with hypoxia.     10/30/22  Seen at [facility] ICU  Lying flat, sleeping comfortably    Plan:  - ECG with no evidence of ischemia or infarction  - Echo 8/22 with normal LV systolic function, mod pulm HTN, IVC small/collapsible  - CT chest with small bilateral pleural effusions, GGO, and infiltrates  - Pleural effusions previously were exudative, likely parapneumonic  - Continue midodrine 10 mg tid  - IV antibiotics-rocephin  - Follow labs  - Being followed by Palliative care, Pulmonary, ID

## 2022-10-30 NOTE — PROGRESS NOTE ADULT - ASSESSMENT
75yo F with PMH of dementia, COPD with chronic resp failure and is vent dependent, s/p PEG, hx of cardiac arrest, diastolic CHF, cor pulmonale, hx of CVA, COVID-19 infxn, CKD, anemia, multiple admissions for respiratory failure (recent admissions in 06/2022 and 08/2022 diagnosed with PNA with parapneumonic pleural effusion s/p thoracentesis and Avycaz) who presents from [facility] for respiratory distress again a/w sepsis and acute on chronic hypoxic respiratory failure due to suspected recurrent VAP.    Severe sepsis and acute hypoxic respiratory failure 2/2 gram-negative PNA   - titrating down FiO2 on vent - have had success maintaining saturation on 40% FiO2 now -- continue to taper down as able  - completed 7-day course of levaquin per ID for PNA (Stenotrophomonas on sputum culture) ; and will c/w just rocephin now for potential CAUTI (10-day course to be completed with dose on 10/30)  - ID ([facility]), recs appreciated   - procalcitonin high but improving 23 ->6.6 within a couple of days of starting Abx and now down to 0.47 on 10/27/22  - cautious use of fluids given hx of CHF and severe hypoalbuminemia (received 1750cc of NS in ED)  - blood cultures (NGTD), urine culture growing E coli sensitive to rocephin  - trach sputum culture - grew Stenotrophomonas sensitive to levaquin  - Checked CT Abd/P to eval for any other potential source sign of infection and found no significant sign of intra-abdominal infection on CT  - c/w midodrine with hold parameters -- consider titrating dose if BP elevated  - cc/pulm consult (Jaime), recs appreciated  - palliative care (Emre), recs appreciated - pt is full code  - leukocytosis resolved    Diastolic CHF  Hx of Pleural effusions  - last TTE was 5/30 with EF 65% with normal LVSF, dilated RV, and moderate pHTN -> repeat TTE appears unchanged  - pt also with severe hypoalbuminemia   - may need repeat thoracentesis (had 1.5L drained few admissions ago), pulmonology consulted; pl effusion was parapneumonic on past admission as opposed to transudative and thus less likely related to CHF    RYAN on CKD 3  - likely in part ATN due to sepsis and in part prerenal   - renal function recovered to baseline CKD 3  - renally dose medications and monitor BMP and electrolytes   - given pt has low muscle mass from being bedbound for years, this GFR estimate is likely falsely high  - monitor BMP  - hypokalemia repleted with KCl    Anemia of chronic disease  - has been evaluated by GI and hematology in the past -> dx with anemia of chronic disease with intermittent GI bleeding  - s/p 2 unit PRBC   - current Hgb generally stable ~ 7.5-8; monitor for drop and possible need for another PRBC - will order T&S for AM in case Hgb downtrends  - no jacqueline GI bleed per nursing     hx of HTN  - not on any anti-hypertensive meds at facility  - monitor VS    DM2  - NPO with TF  - c/w moderate dose lispro coverage sliding scale q6h  - goal -180 - now at goal - c/w lantus 5un sq QAM     Diet  - TF was on hold 2/2 leak around PEG site, GI readjusted PEG and not leaking currently -- c/w tube feeds per dietician recs  - GI (Leia/ group), recs appreciated     Preventive measures  - cont with heparin subq for DVT ppx  - Full Code     Disp  - f/up with SW tomorrow if [facility] will accept pt back on 40% FiO2

## 2022-10-31 LAB
ALBUMIN SERPL ELPH-MCNC: 1.5 G/DL — LOW (ref 3.3–5)
ALP SERPL-CCNC: 130 U/L — HIGH (ref 30–120)
ALT FLD-CCNC: 11 U/L DA — SIGNIFICANT CHANGE UP (ref 10–60)
ANION GAP SERPL CALC-SCNC: 7 MMOL/L — SIGNIFICANT CHANGE UP (ref 5–17)
AST SERPL-CCNC: 16 U/L — SIGNIFICANT CHANGE UP (ref 10–40)
BILIRUB SERPL-MCNC: 0.2 MG/DL — SIGNIFICANT CHANGE UP (ref 0.2–1.2)
BUN SERPL-MCNC: 26 MG/DL — HIGH (ref 7–23)
CALCIUM SERPL-MCNC: 8.2 MG/DL — LOW (ref 8.4–10.5)
CHLORIDE SERPL-SCNC: 101 MMOL/L — SIGNIFICANT CHANGE UP (ref 96–108)
CO2 SERPL-SCNC: 27 MMOL/L — SIGNIFICANT CHANGE UP (ref 22–31)
CREAT SERPL-MCNC: 1.01 MG/DL — SIGNIFICANT CHANGE UP (ref 0.5–1.3)
EGFR: 57 ML/MIN/1.73M2 — LOW
GLUCOSE BLDC GLUCOMTR-MCNC: 133 MG/DL — HIGH (ref 70–99)
GLUCOSE BLDC GLUCOMTR-MCNC: 139 MG/DL — HIGH (ref 70–99)
GLUCOSE BLDC GLUCOMTR-MCNC: 179 MG/DL — HIGH (ref 70–99)
GLUCOSE BLDC GLUCOMTR-MCNC: 261 MG/DL — HIGH (ref 70–99)
GLUCOSE SERPL-MCNC: 131 MG/DL — HIGH (ref 70–99)
HCT VFR BLD CALC: 24.6 % — LOW (ref 34.5–45)
HGB BLD-MCNC: 7.2 G/DL — LOW (ref 11.5–15.5)
HGB BLD-MCNC: 9 G/DL — LOW (ref 11.5–15.5)
MAGNESIUM SERPL-MCNC: 2 MG/DL — SIGNIFICANT CHANGE UP (ref 1.6–2.6)
MCHC RBC-ENTMCNC: 27 PG — SIGNIFICANT CHANGE UP (ref 27–34)
MCHC RBC-ENTMCNC: 29.3 GM/DL — LOW (ref 32–36)
MCV RBC AUTO: 92.1 FL — SIGNIFICANT CHANGE UP (ref 80–100)
NRBC # BLD: 0 /100 WBCS — SIGNIFICANT CHANGE UP (ref 0–0)
PLATELET # BLD AUTO: 159 K/UL — SIGNIFICANT CHANGE UP (ref 150–400)
POTASSIUM SERPL-MCNC: 4.8 MMOL/L — SIGNIFICANT CHANGE UP (ref 3.5–5.3)
POTASSIUM SERPL-SCNC: 4.8 MMOL/L — SIGNIFICANT CHANGE UP (ref 3.5–5.3)
PROT SERPL-MCNC: 6.5 G/DL — SIGNIFICANT CHANGE UP (ref 6–8.3)
RBC # BLD: 2.67 M/UL — LOW (ref 3.8–5.2)
RBC # FLD: 18.8 % — HIGH (ref 10.3–14.5)
SARS-COV-2 RNA SPEC QL NAA+PROBE: SIGNIFICANT CHANGE UP
SODIUM SERPL-SCNC: 135 MMOL/L — SIGNIFICANT CHANGE UP (ref 135–145)
WBC # BLD: 8.82 K/UL — SIGNIFICANT CHANGE UP (ref 3.8–10.5)
WBC # FLD AUTO: 8.82 K/UL — SIGNIFICANT CHANGE UP (ref 3.8–10.5)

## 2022-10-31 PROCEDURE — 99233 SBSQ HOSP IP/OBS HIGH 50: CPT

## 2022-10-31 RX ORDER — METOCLOPRAMIDE HCL 10 MG
10 TABLET ORAL EVERY 6 HOURS
Refills: 0 | Status: DISCONTINUED | OUTPATIENT
Start: 2022-10-31 | End: 2022-11-02

## 2022-10-31 RX ADMIN — MIDODRINE HYDROCHLORIDE 10 MILLIGRAM(S): 2.5 TABLET ORAL at 05:42

## 2022-10-31 RX ADMIN — Medication 1 PUFF(S): at 06:42

## 2022-10-31 RX ADMIN — ALBUTEROL 2 PUFF(S): 90 AEROSOL, METERED ORAL at 06:42

## 2022-10-31 RX ADMIN — ALBUTEROL 2 PUFF(S): 90 AEROSOL, METERED ORAL at 19:13

## 2022-10-31 RX ADMIN — SIMETHICONE 80 MILLIGRAM(S): 80 TABLET, CHEWABLE ORAL at 17:26

## 2022-10-31 RX ADMIN — HEPARIN SODIUM 5000 UNIT(S): 5000 INJECTION INTRAVENOUS; SUBCUTANEOUS at 05:43

## 2022-10-31 RX ADMIN — Medication 1 PUFF(S): at 13:49

## 2022-10-31 RX ADMIN — CHLORHEXIDINE GLUCONATE 1 APPLICATION(S): 213 SOLUTION TOPICAL at 05:44

## 2022-10-31 RX ADMIN — Medication 1 MILLIGRAM(S): at 17:06

## 2022-10-31 RX ADMIN — Medication 1 MILLIGRAM(S): at 21:30

## 2022-10-31 RX ADMIN — Medication 1 MILLIGRAM(S): at 23:42

## 2022-10-31 RX ADMIN — Medication 1 MILLIGRAM(S): at 05:42

## 2022-10-31 RX ADMIN — SIMETHICONE 80 MILLIGRAM(S): 80 TABLET, CHEWABLE ORAL at 05:42

## 2022-10-31 RX ADMIN — HEPARIN SODIUM 5000 UNIT(S): 5000 INJECTION INTRAVENOUS; SUBCUTANEOUS at 17:07

## 2022-10-31 RX ADMIN — Medication 1 APPLICATION(S): at 11:55

## 2022-10-31 RX ADMIN — Medication 1 MILLIGRAM(S): at 00:15

## 2022-10-31 RX ADMIN — ALBUTEROL 2 PUFF(S): 90 AEROSOL, METERED ORAL at 00:43

## 2022-10-31 RX ADMIN — MIDODRINE HYDROCHLORIDE 10 MILLIGRAM(S): 2.5 TABLET ORAL at 21:31

## 2022-10-31 RX ADMIN — Medication 2: at 17:15

## 2022-10-31 RX ADMIN — Medication 1 PUFF(S): at 00:43

## 2022-10-31 RX ADMIN — Medication 1 MILLIGRAM(S): at 12:17

## 2022-10-31 RX ADMIN — INSULIN GLARGINE 5 UNIT(S): 100 INJECTION, SOLUTION SUBCUTANEOUS at 06:12

## 2022-10-31 RX ADMIN — MIDODRINE HYDROCHLORIDE 10 MILLIGRAM(S): 2.5 TABLET ORAL at 13:22

## 2022-10-31 RX ADMIN — Medication 6: at 00:03

## 2022-10-31 RX ADMIN — ALBUTEROL 2 PUFF(S): 90 AEROSOL, METERED ORAL at 13:48

## 2022-10-31 RX ADMIN — CHLORHEXIDINE GLUCONATE 15 MILLILITER(S): 213 SOLUTION TOPICAL at 17:06

## 2022-10-31 RX ADMIN — Medication 1 PUFF(S): at 19:13

## 2022-10-31 RX ADMIN — PANTOPRAZOLE SODIUM 40 MILLIGRAM(S): 20 TABLET, DELAYED RELEASE ORAL at 06:21

## 2022-10-31 RX ADMIN — Medication 650 MILLIGRAM(S): at 21:31

## 2022-10-31 RX ADMIN — CHLORHEXIDINE GLUCONATE 15 MILLILITER(S): 213 SOLUTION TOPICAL at 05:45

## 2022-10-31 RX ADMIN — CHLORHEXIDINE GLUCONATE 1 APPLICATION(S): 213 SOLUTION TOPICAL at 17:06

## 2022-10-31 NOTE — PROGRESS NOTE ADULT - ASSESSMENT
PEG dysfunction  adjusted again  likely has gastroparesis due to overall medical state  continue feeds  slow feeds    Advanced care planning was discussed with patient and family.  Advanced care planning forms were reviewed and discussed.  Risks, benefits and alternatives of gastroenterologic procedures were discussed in detail and all questions were answered.    30 minutes spent.

## 2022-10-31 NOTE — PROGRESS NOTE ADULT - SUBJECTIVE AND OBJECTIVE BOX
BREA BECKHAM     [facility]    Allergies    codeine (Hives)    Intolerances        PAST MEDICAL & SURGICAL HISTORY:  Dementia of frontal lobe type      Aphasic stroke      Diabetes mellitus      Respiratory failure      Hypertension      GERD (gastroesophageal reflux disease)      Constipation      Respiratory failure      CVA (cerebral vascular accident)      HTN (hypertension)      DM (diabetes mellitus)      Advanced dementia      COVID-19 virus detected      Quadriplegia      Pneumonia      Type II diabetes mellitus      Hx of appendectomy      Gastrostomy in place      Tracheostomy in place      Tracheostomy tube present      Feeding by G-tube          FAMILY HISTORY:      Home Medications:  albuterol 90 mcg/inh inhalation aerosol with adapter: 2  inhaled every 6 hours (18 Oct 2022 11:42)  Bacid (LAC) oral tablet: 2 tab(s) by gastrostomy tube once a day (18 Oct 2022 11:42)  Betadine 10% topical swab: cleanse right and left great toes (18 Oct 2022 11:42)  chlorhexidine 0.12% mucous membrane liquid: 15 milliliter(s) mucous membrane 2 times a day (18 Oct 2022 11:42)  Eucerin topical cream: Apply topically to affected area once a day bilateral feet (18 Oct 2022 11:42)  insulin glargine 100 units/mL subcutaneous solution: 8 unit(s) subcutaneous once a day (in the morning) (18 Oct 2022 11:42)  ipratropium-albuterol 0.5 mg-2.5 mg/3 mL inhalation solution: 3 milliliter(s) inhaled 4 times a day (18 Oct 2022 11:42)  LORazepam 1 mg oral tablet: 1 tab(s) by gastrostomy tube every 4 hours (18 Oct 2022 11:42)  methocarbamol 500 mg oral tablet: 1 tab(s) by gastrostomy tube 2 times a day (18 Oct 2022 11:42)  MiraLax oral powder for reconstitution: g-tube (18 Oct 2022 13:04)  Multiple Vitamins oral tablet: 1 tab(s) orally once a day (18 Oct 2022 11:42)  nystatin 100,000 units/g topical powder: 1 application topically 3 times a day (18 Oct 2022 11:42)  omeprazole 20 mg oral delayed release capsule: orally 2 times a day (18 Oct 2022 11:42)  polyethylene glycol 3350 oral powder for reconstitution: 17 gram(s) by gastrostomy tube every 12 hours (18 Oct 2022 11:42)  senna 8.6 mg oral tablet: 3 tab(s) by gastrostomy tube once a day (at bedtime) (18 Oct 2022 11:42)  simethicone 80 mg oral tablet: g-tube (18 Oct 2022 13:04)  simethicone 80 mg oral tablet, chewable: 1 tab(s) by gastrostomy tube every 6 hours (18 Oct 2022 11:42)  sucralfate 1 g/10 mL oral suspension: 10 milliliter(s) g-tube 4 times a day (before meals and at bedtime) (18 Oct 2022 13:04)  Tylenol 325 mg oral tablet: 2 tab(s) by gastrostomy tube once a day; 60 minutes prior to dressing change  (18 Oct 2022 11:42)  Tylenol 325 mg oral tablet: 2 tab(s) by gastrostomy tube every 6 hours, As Needed (18 Oct 2022 11:42)      MEDICATIONS  (STANDING):  ALBUTerol    90 MICROgram(s) HFA Inhaler 2 Puff(s) Inhalation every 6 hours  cadexomer iodine 0.9% Gel 1 Application(s) Topical <User Schedule>  chlorhexidine 0.12% Liquid 15 milliLiter(s) Oral Mucosa every 12 hours  chlorhexidine 2% Cloths 1 Application(s) Topical two times a day  dextrose 5%. 1000 milliLiter(s) (100 mL/Hr) IV Continuous <Continuous>  dextrose 5%. 1000 milliLiter(s) (50 mL/Hr) IV Continuous <Continuous>  dextrose 50% Injectable 25 Gram(s) IV Push once  dextrose 50% Injectable 12.5 Gram(s) IV Push once  dextrose 50% Injectable 25 Gram(s) IV Push once  glucagon  Injectable 1 milliGRAM(s) IntraMuscular once  heparin   Injectable 5000 Unit(s) SubCutaneous every 12 hours  insulin glargine Injectable (LANTUS) 5 Unit(s) SubCutaneous every morning  insulin lispro (ADMELOG) corrective regimen sliding scale   SubCutaneous every 6 hours  ipratropium 17 MICROgram(s) HFA Inhaler 1 Puff(s) Inhalation every 6 hours  LORazepam     Tablet 1 milliGRAM(s) Oral every 6 hours  midodrine 10 milliGRAM(s) Oral every 8 hours  pantoprazole    Tablet 40 milliGRAM(s) Oral before breakfast  povidone iodine 10% Solution 1 Application(s) Topical daily  simethicone 80 milliGRAM(s) Chew every 12 hours    MEDICATIONS  (PRN):  acetaminophen     Tablet .. 650 milliGRAM(s) Oral every 6 hours PRN Temp greater or equal to 38C (100.4F), Mild Pain (1 - 3)  aluminum hydroxide/magnesium hydroxide/simethicone Suspension 30 milliLiter(s) Oral every 4 hours PRN Dyspepsia  dextrose Oral Gel 15 Gram(s) Oral once PRN Blood Glucose LESS THAN 70 milliGRAM(s)/deciliter  LORazepam   Injectable 1 milliGRAM(s) IV Push every 4 hours PRN Agitation  melatonin 3 milliGRAM(s) Oral at bedtime PRN Insomnia  metoclopramide Injectable 10 milliGRAM(s) IV Push every 6 hours PRN nausea  ondansetron Injectable 4 milliGRAM(s) IV Push every 8 hours PRN Nausea and/or Vomiting      Diet, NPO with Tube Feed:   Tube Feeding Modality: Gastrostomy  Glucerna 1.5 Horacio  Total Volume for 24 Hours (mL): 960  Continuous  Starting Tube Feed Rate mL per Hour: 40  Until Goal Tube Feed Rate (mL per Hour): 40  Tube Feed Duration (in Hours): 24  Tube Feed Start Time: 14:00  Free Water Flush  Pump   Rate (mL per Hour): 30   Frequency: Every Hour    Duration (Hours): 24    Start Time: 14:00  Lucas(7 Gm Arginine/7 Gm Glut/1.2 Gm HMB     Qty per Day:  1 (10-29-22 @ 12:32) [Active]          Vital Signs Last 24 Hrs  T(C): 37.3 (31 Oct 2022 06:00), Max: 37.3 (30 Oct 2022 16:27)  T(F): 99.1 (31 Oct 2022 06:00), Max: 99.1 (30 Oct 2022 16:27)  HR: 70 (31 Oct 2022 06:54) (65 - 115)  BP: 107/75 (31 Oct 2022 06:00) (89/59 - 223/125)  BP(mean): 86 (31 Oct 2022 06:00) (64 - 151)  RR: 18 (31 Oct 2022 06:00) (17 - 34)  SpO2: 100% (31 Oct 2022 06:54) (90% - 100%)    Parameters below as of 31 Oct 2022 06:54  Patient On (Oxygen Delivery Method): ventilator,.50          10-30-22 @ 07:01  -  10-31-22 @ 07:00  --------------------------------------------------------  IN: 1420 mL / OUT: 1400 mL / NET: 20 mL    10-31-22 @ 07:01  -  10-31-22 @ 09:53  --------------------------------------------------------  IN: 140 mL / OUT: 0 mL / NET: 140 mL        Mode: AC/ CMV (Assist Control/ Continuous Mandatory Ventilation), RR (machine): 18, TV (machine): 400, FiO2: 50, PEEP: 5, ITime: 1, MAP: 16, PIP: 32      LABS:                        7.2    8.82  )-----------( 159      ( 31 Oct 2022 05:30 )             24.6     10-31    135  |  101  |  26<H>  ----------------------------<  131<H>  4.8   |  27  |  1.01    Ca    8.2<L>      31 Oct 2022 05:30  Mg     2.0     10-31    TPro  6.5  /  Alb  1.5<L>  /  TBili  0.2  /  DBili  x   /  AST  16  /  ALT  11  /  AlkPhos  130<H>  10-31              WBC:  WBC Count: 8.82 K/uL (10-31 @ 05:30)  WBC Count: 7.09 K/uL (10-30 @ 07:02)  WBC Count: 10.02 K/uL (10-29 @ 06:44)  WBC Count: 8.11 K/uL (10-28 @ 06:15)      MICROBIOLOGY:  RECENT CULTURES:                  Sodium:  Sodium, Serum: 135 mmol/L (10-31 @ 05:30)  Sodium, Serum: 135 mmol/L (10-30 @ 07:02)  Sodium, Serum: 134 mmol/L (10-29 @ 06:44)  Sodium, Serum: 138 mmol/L (10-28 @ 06:15)      1.01 mg/dL 10-31 @ 05:30  0.87 mg/dL 10-30 @ 07:02  0.86 mg/dL 10-29 @ 06:44  0.96 mg/dL 10-28 @ 06:15      Hemoglobin:  Hemoglobin: 7.2 g/dL (10-31 @ 05:30)  Hemoglobin: 7.4 g/dL (10-30 @ 07:02)  Hemoglobin: 8.3 g/dL (10-29 @ 06:44)  Hemoglobin: 7.6 g/dL (10-28 @ 06:15)      Platelets: Platelet Count - Automated: 159 K/uL (10-31 @ 05:30)  Platelet Count - Automated: 157 K/uL (10-30 @ 07:02)  Platelet Count - Automated: 169 K/uL (10-29 @ 06:44)  Platelet Count - Automated: 158 K/uL (10-28 @ 06:15)      LIVER FUNCTIONS - ( 31 Oct 2022 05:30 )  Alb: 1.5 g/dL / Pro: 6.5 g/dL / ALK PHOS: 130 U/L / ALT: 11 U/L DA / AST: 16 U/L / GGT: x                 RADIOLOGY & ADDITIONAL STUDIES:      MICROBIOLOGY:  RECENT CULTURES:

## 2022-10-31 NOTE — PROGRESS NOTE ADULT - SUBJECTIVE AND OBJECTIVE BOX
Chief Complaint: Hypoxia    Interval Events: No events overnight.    Review of Systems:  Unable to obtain    Physical Exam:  Vital Signs Last 24 Hrs  T(C): 37.3 (31 Oct 2022 06:00), Max: 37.3 (30 Oct 2022 16:27)  T(F): 99.1 (31 Oct 2022 06:00), Max: 99.1 (30 Oct 2022 16:27)  HR: 70 (31 Oct 2022 06:54) (65 - 115)  BP: 107/75 (31 Oct 2022 06:00) (89/59 - 223/125)  BP(mean): 86 (31 Oct 2022 06:00) (64 - 151)  RR: 18 (31 Oct 2022 06:00) (17 - 34)  SpO2: 100% (31 Oct 2022 06:54) (90% - 100%)  Parameters below as of 31 Oct 2022 06:54  Patient On (Oxygen Delivery Method): ventilator,.50  General: Unresponsive  HEENT: Trach  Neck: No JVD, no carotid bruit  Lungs: CTAB  CV: RRR, nl S1/S2, no M/R/G  Abdomen: S/NT/ND, +BS  Extremities: No LE edema, no cyanosis  Neuro: AAOx0  Skin: No rash    Labs:    10-31    135  |  101  |  26<H>  ----------------------------<  131<H>  4.8   |  27  |  1.01    Ca    8.2<L>      31 Oct 2022 05:30  Mg     2.0     10-31    TPro  6.5  /  Alb  1.5<L>  /  TBili  0.2  /  DBili  x   /  AST  16  /  ALT  11  /  AlkPhos  130<H>  10-31                        7.2    8.82  )-----------( 159      ( 31 Oct 2022 05:30 )             24.6       Telemetry: Sinus rhythm

## 2022-10-31 NOTE — PROGRESS NOTE ADULT - SUBJECTIVE AND OBJECTIVE BOX
Date/Time Patient Seen:  		  Referring MD:   Data Reviewed	       Patient is a 76y old  Female who presents with a chief complaint of Acute on Hypoxemic respiratory failure (30 Oct 2022 19:45)      Subjective/HPI     PAST MEDICAL & SURGICAL HISTORY:  Dementia of frontal lobe type    Aphasic stroke    Diabetes mellitus    Respiratory failure    Hypertension    GERD (gastroesophageal reflux disease)    Constipation    Respiratory failure    CVA (cerebral vascular accident)    HTN (hypertension)    DM (diabetes mellitus)    Advanced dementia    COVID-19 virus detected    Quadriplegia    Pneumonia    Type II diabetes mellitus    Hx of appendectomy    Gastrostomy in place    Tracheostomy in place    Tracheostomy tube present    Feeding by G-tube          Medication list         MEDICATIONS  (STANDING):  ALBUTerol    90 MICROgram(s) HFA Inhaler 2 Puff(s) Inhalation every 6 hours  cadexomer iodine 0.9% Gel 1 Application(s) Topical <User Schedule>  chlorhexidine 0.12% Liquid 15 milliLiter(s) Oral Mucosa every 12 hours  chlorhexidine 2% Cloths 1 Application(s) Topical two times a day  dextrose 5%. 1000 milliLiter(s) (100 mL/Hr) IV Continuous <Continuous>  dextrose 5%. 1000 milliLiter(s) (50 mL/Hr) IV Continuous <Continuous>  dextrose 50% Injectable 25 Gram(s) IV Push once  dextrose 50% Injectable 12.5 Gram(s) IV Push once  dextrose 50% Injectable 25 Gram(s) IV Push once  glucagon  Injectable 1 milliGRAM(s) IntraMuscular once  heparin   Injectable 5000 Unit(s) SubCutaneous every 12 hours  insulin glargine Injectable (LANTUS) 5 Unit(s) SubCutaneous every morning  insulin lispro (ADMELOG) corrective regimen sliding scale   SubCutaneous every 6 hours  ipratropium 17 MICROgram(s) HFA Inhaler 1 Puff(s) Inhalation every 6 hours  LORazepam     Tablet 1 milliGRAM(s) Oral every 6 hours  midodrine 10 milliGRAM(s) Oral every 8 hours  pantoprazole    Tablet 40 milliGRAM(s) Oral before breakfast  povidone iodine 10% Solution 1 Application(s) Topical daily  simethicone 80 milliGRAM(s) Chew every 12 hours    MEDICATIONS  (PRN):  acetaminophen     Tablet .. 650 milliGRAM(s) Oral every 6 hours PRN Temp greater or equal to 38C (100.4F), Mild Pain (1 - 3)  aluminum hydroxide/magnesium hydroxide/simethicone Suspension 30 milliLiter(s) Oral every 4 hours PRN Dyspepsia  dextrose Oral Gel 15 Gram(s) Oral once PRN Blood Glucose LESS THAN 70 milliGRAM(s)/deciliter  LORazepam   Injectable 1 milliGRAM(s) IV Push every 4 hours PRN Agitation  melatonin 3 milliGRAM(s) Oral at bedtime PRN Insomnia  ondansetron Injectable 4 milliGRAM(s) IV Push every 8 hours PRN Nausea and/or Vomiting         Vitals log        ICU Vital Signs Last 24 Hrs  T(C): 37.3 (31 Oct 2022 00:08), Max: 37.3 (30 Oct 2022 16:27)  T(F): 99.1 (31 Oct 2022 00:08), Max: 99.1 (30 Oct 2022 16:27)  HR: 65 (31 Oct 2022 05:02) (65 - 115)  BP: 89/59 (31 Oct 2022 02:00) (89/59 - 223/125)  BP(mean): 69 (31 Oct 2022 02:00) (68 - 151)  ABP: --  ABP(mean): --  RR: 20 (31 Oct 2022 02:00) (17 - 34)  SpO2: 96% (31 Oct 2022 05:02) (90% - 100%)    O2 Parameters below as of 31 Oct 2022 02:00  Patient On (Oxygen Delivery Method): ventilator    O2 Concentration (%): 40         Mode: AC/ CMV (Assist Control/ Continuous Mandatory Ventilation)  RR (machine): 18  TV (machine): 400  FiO2: 50  PEEP: 5  ITime: 1  MAP: 13  PIP: 27      Input and Output:  I&O's Detail    29 Oct 2022 07:01  -  30 Oct 2022 07:00  --------------------------------------------------------  IN:    Free Water: 430 mL    Glucerna 1.5: 720 mL    Nepro with Carb Steady: 240 mL  Total IN: 1390 mL    OUT:    Indwelling Catheter - Urethral (mL): 930 mL  Total OUT: 930 mL    Total NET: 460 mL      30 Oct 2022 07:01  -  31 Oct 2022 06:12  --------------------------------------------------------  IN:    Free Water: 340 mL    Glucerna 1.5: 800 mL  Total IN: 1140 mL    OUT:    Indwelling Catheter - Urethral (mL): 1100 mL  Total OUT: 1100 mL    Total NET: 40 mL          Lab Data                        7.2    8.82  )-----------( 159      ( 31 Oct 2022 05:30 )             24.6     10-30    135  |  102  |  24<H>  ----------------------------<  161<H>  4.2   |  26  |  0.87    Ca    8.4      30 Oct 2022 07:02  Mg     1.8     10-30    TPro  6.4  /  Alb  1.5<L>  /  TBili  0.3  /  DBili  x   /  AST  16  /  ALT  14  /  AlkPhos  128<H>  10-30            Review of Systems	      Objective     Physical Examination    heart s1s2  lung dec BS  head nc      Pertinent Lab findings & Imaging      Annalise:  NO   Adequate UO     I&O's Detail    29 Oct 2022 07:01  -  30 Oct 2022 07:00  --------------------------------------------------------  IN:    Free Water: 430 mL    Glucerna 1.5: 720 mL    Nepro with Carb Steady: 240 mL  Total IN: 1390 mL    OUT:    Indwelling Catheter - Urethral (mL): 930 mL  Total OUT: 930 mL    Total NET: 460 mL      30 Oct 2022 07:01  -  31 Oct 2022 06:12  --------------------------------------------------------  IN:    Free Water: 340 mL    Glucerna 1.5: 800 mL  Total IN: 1140 mL    OUT:    Indwelling Catheter - Urethral (mL): 1100 mL  Total OUT: 1100 mL    Total NET: 40 mL               Discussed with:     Cultures:	        Radiology

## 2022-10-31 NOTE — PROGRESS NOTE ADULT - ASSESSMENT
BREA BECKHAM     [facility] 01    Allergies    codeine (Hives)    Intolerances        PAST MEDICAL & SURGICAL HISTORY:  Dementia of frontal lobe type      Aphasic stroke      Diabetes mellitus      Respiratory failure      Hypertension      GERD (gastroesophageal reflux disease)      Constipation      Respiratory failure      CVA (cerebral vascular accident)      HTN (hypertension)      DM (diabetes mellitus)      Advanced dementia      COVID-19 virus detected      Quadriplegia      Pneumonia      Type II diabetes mellitus      Hx of appendectomy      Gastrostomy in place      Tracheostomy in place      Tracheostomy tube present      Feeding by G-tube          FAMILY HISTORY:      Home Medications:  albuterol 90 mcg/inh inhalation aerosol with adapter: 2  inhaled every 6 hours (18 Oct 2022 11:42)  Bacid (LAC) oral tablet: 2 tab(s) by gastrostomy tube once a day (18 Oct 2022 11:42)  Betadine 10% topical swab: cleanse right and left great toes (18 Oct 2022 11:42)  chlorhexidine 0.12% mucous membrane liquid: 15 milliliter(s) mucous membrane 2 times a day (18 Oct 2022 11:42)  Eucerin topical cream: Apply topically to affected area once a day bilateral feet (18 Oct 2022 11:42)  insulin glargine 100 units/mL subcutaneous solution: 8 unit(s) subcutaneous once a day (in the morning) (18 Oct 2022 11:42)  ipratropium-albuterol 0.5 mg-2.5 mg/3 mL inhalation solution: 3 milliliter(s) inhaled 4 times a day (18 Oct 2022 11:42)  LORazepam 1 mg oral tablet: 1 tab(s) by gastrostomy tube every 4 hours (18 Oct 2022 11:42)  methocarbamol 500 mg oral tablet: 1 tab(s) by gastrostomy tube 2 times a day (18 Oct 2022 11:42)  MiraLax oral powder for reconstitution: g-tube (18 Oct 2022 13:04)  Multiple Vitamins oral tablet: 1 tab(s) orally once a day (18 Oct 2022 11:42)  nystatin 100,000 units/g topical powder: 1 application topically 3 times a day (18 Oct 2022 11:42)  omeprazole 20 mg oral delayed release capsule: orally 2 times a day (18 Oct 2022 11:42)  polyethylene glycol 3350 oral powder for reconstitution: 17 gram(s) by gastrostomy tube every 12 hours (18 Oct 2022 11:42)  senna 8.6 mg oral tablet: 3 tab(s) by gastrostomy tube once a day (at bedtime) (18 Oct 2022 11:42)  simethicone 80 mg oral tablet: g-tube (18 Oct 2022 13:04)  simethicone 80 mg oral tablet, chewable: 1 tab(s) by gastrostomy tube every 6 hours (18 Oct 2022 11:42)  sucralfate 1 g/10 mL oral suspension: 10 milliliter(s) g-tube 4 times a day (before meals and at bedtime) (18 Oct 2022 13:04)  Tylenol 325 mg oral tablet: 2 tab(s) by gastrostomy tube once a day; 60 minutes prior to dressing change  (18 Oct 2022 11:42)  Tylenol 325 mg oral tablet: 2 tab(s) by gastrostomy tube every 6 hours, As Needed (18 Oct 2022 11:42)      MEDICATIONS  (STANDING):  ALBUTerol    90 MICROgram(s) HFA Inhaler 2 Puff(s) Inhalation every 6 hours  cadexomer iodine 0.9% Gel 1 Application(s) Topical <User Schedule>  chlorhexidine 0.12% Liquid 15 milliLiter(s) Oral Mucosa every 12 hours  chlorhexidine 2% Cloths 1 Application(s) Topical two times a day  dextrose 5%. 1000 milliLiter(s) (100 mL/Hr) IV Continuous <Continuous>  dextrose 5%. 1000 milliLiter(s) (50 mL/Hr) IV Continuous <Continuous>  dextrose 50% Injectable 25 Gram(s) IV Push once  dextrose 50% Injectable 12.5 Gram(s) IV Push once  dextrose 50% Injectable 25 Gram(s) IV Push once  glucagon  Injectable 1 milliGRAM(s) IntraMuscular once  heparin   Injectable 5000 Unit(s) SubCutaneous every 12 hours  insulin glargine Injectable (LANTUS) 5 Unit(s) SubCutaneous every morning  insulin lispro (ADMELOG) corrective regimen sliding scale   SubCutaneous every 6 hours  ipratropium 17 MICROgram(s) HFA Inhaler 1 Puff(s) Inhalation every 6 hours  LORazepam     Tablet 1 milliGRAM(s) Oral every 6 hours  midodrine 10 milliGRAM(s) Oral every 8 hours  pantoprazole    Tablet 40 milliGRAM(s) Oral before breakfast  povidone iodine 10% Solution 1 Application(s) Topical daily  simethicone 80 milliGRAM(s) Chew every 12 hours    MEDICATIONS  (PRN):  acetaminophen     Tablet .. 650 milliGRAM(s) Oral every 6 hours PRN Temp greater or equal to 38C (100.4F), Mild Pain (1 - 3)  aluminum hydroxide/magnesium hydroxide/simethicone Suspension 30 milliLiter(s) Oral every 4 hours PRN Dyspepsia  dextrose Oral Gel 15 Gram(s) Oral once PRN Blood Glucose LESS THAN 70 milliGRAM(s)/deciliter  LORazepam   Injectable 1 milliGRAM(s) IV Push every 4 hours PRN Agitation  melatonin 3 milliGRAM(s) Oral at bedtime PRN Insomnia  metoclopramide Injectable 10 milliGRAM(s) IV Push every 6 hours PRN nausea  ondansetron Injectable 4 milliGRAM(s) IV Push every 8 hours PRN Nausea and/or Vomiting      Diet, NPO with Tube Feed:   Tube Feeding Modality: Gastrostomy  Glucerna 1.5 Horacio  Total Volume for 24 Hours (mL): 960  Continuous  Starting Tube Feed Rate mL per Hour: 40  Until Goal Tube Feed Rate (mL per Hour): 40  Tube Feed Duration (in Hours): 24  Tube Feed Start Time: 14:00  Free Water Flush  Pump   Rate (mL per Hour): 30   Frequency: Every Hour    Duration (Hours): 24    Start Time: 14:00  Lucas(7 Gm Arginine/7 Gm Glut/1.2 Gm HMB     Qty per Day:  1 (10-29-22 @ 12:32) [Active]          Vital Signs Last 24 Hrs  T(C): 37.3 (31 Oct 2022 12:34), Max: 37.3 (31 Oct 2022 00:08)  T(F): 99.1 (31 Oct 2022 12:34), Max: 99.1 (31 Oct 2022 00:08)  HR: 69 (31 Oct 2022 16:00) (61 - 115)  BP: 117/60 (31 Oct 2022 16:00) (89/59 - 223/125)  BP(mean): 78 (31 Oct 2022 16:00) (64 - 151)  RR: 19 (31 Oct 2022 16:00) (17 - 34)  SpO2: 100% (31 Oct 2022 16:00) (90% - 100%)    Parameters below as of 31 Oct 2022 16:00  Patient On (Oxygen Delivery Method): ventilator    O2 Concentration (%): 50      10-30-22 @ 07:01  -  10-31-22 @ 07:00  --------------------------------------------------------  IN: 1420 mL / OUT: 1400 mL / NET: 20 mL    10-31-22 @ 07:01  -  10-31-22 @ 18:23  --------------------------------------------------------  IN: 770 mL / OUT: 0 mL / NET: 770 mL        Mode: AC/ CMV (Assist Control/ Continuous Mandatory Ventilation), RR (machine): 18, TV (machine): 400, FiO2: 50, PEEP: 5, ITime: 1, MAP: 16, PIP: 35      LABS:                        9.0    x     )-----------( x        ( 31 Oct 2022 17:31 )             x        10-31    135  |  101  |  26<H>  ----------------------------<  131<H>  4.8   |  27  |  1.01    Ca    8.2<L>      31 Oct 2022 05:30  Mg     2.0     10-31    TPro  6.5  /  Alb  1.5<L>  /  TBili  0.2  /  DBili  x   /  AST  16  /  ALT  11  /  AlkPhos  130<H>  10-31              WBC:  WBC Count: 8.82 K/uL (10-31 @ 05:30)  WBC Count: 7.09 K/uL (10-30 @ 07:02)  WBC Count: 10.02 K/uL (10-29 @ 06:44)  WBC Count: 8.11 K/uL (10-28 @ 06:15)      MICROBIOLOGY:  RECENT CULTURES:                  Sodium:  Sodium, Serum: 135 mmol/L (10-31 @ 05:30)  Sodium, Serum: 135 mmol/L (10-30 @ 07:02)  Sodium, Serum: 134 mmol/L (10-29 @ 06:44)  Sodium, Serum: 138 mmol/L (10-28 @ 06:15)      1.01 mg/dL 10-31 @ 05:30  0.87 mg/dL 10-30 @ 07:02  0.86 mg/dL 10-29 @ 06:44  0.96 mg/dL 10-28 @ 06:15      Hemoglobin:  Hemoglobin: 9.0 g/dL (10-31 @ 17:31)  Hemoglobin: 7.2 g/dL (10-31 @ 05:30)  Hemoglobin: 7.4 g/dL (10-30 @ 07:02)  Hemoglobin: 8.3 g/dL (10-29 @ 06:44)  Hemoglobin: 7.6 g/dL (10-28 @ 06:15)      Platelets: Platelet Count - Automated: 159 K/uL (10-31 @ 05:30)  Platelet Count - Automated: 157 K/uL (10-30 @ 07:02)  Platelet Count - Automated: 169 K/uL (10-29 @ 06:44)  Platelet Count - Automated: 158 K/uL (10-28 @ 06:15)      LIVER FUNCTIONS - ( 31 Oct 2022 05:30 )  Alb: 1.5 g/dL / Pro: 6.5 g/dL / ALK PHOS: 130 U/L / ALT: 11 U/L DA / AST: 16 U/L / GGT: x                 RADIOLOGY & ADDITIONAL STUDIES:      MICROBIOLOGY:  RECENT CULTURES:

## 2022-10-31 NOTE — PROGRESS NOTE ADULT - ASSESSMENT
The patient is a 76 year old female with a history of HTN, DM, CVA, dementia, chronic respiratory failure s/p trach, PEG, cardiac arrest, anemia, pleural effusions who presents with hypoxia.     Plan:  - ECG with no evidence of ischemia or infarction  - Echo 8/22 with normal LV systolic function, mod pulm HTN, IVC small/collapsible  - CT chest with small bilateral pleural effusions, GGO, and infiltrates  - Pleural effusions previously were exudative, likely parapneumonic  - Continue midodrine 10 mg tid  - Completed antibiotics

## 2022-10-31 NOTE — PROGRESS NOTE ADULT - ASSESSMENT
76F with dementia, COPD with chronic resp failure and is vent dependent, s/p PEG, hx of cardiac arrest, CHF, cor pulmonale, CVA, COVID-19 infxn, CKD, anemia, multiple admissions    dementia  COPD  chr resp failure  vegetative state  dysphagia  peg  hx of card arrest - anoxic brain  CHF  cva    Fio2 titration in progress for preparation to DC back to SNF   s/p ABX  CCM notes reviewed  vs noted  labs reviewed  Wound care in progress -   GI consult noted    Diagnosis; Prognosis; MOLST Discussed  Marciano -   HCP  pt is full code  spoke with  -

## 2022-10-31 NOTE — PROGRESS NOTE ADULT - SUBJECTIVE AND OBJECTIVE BOX
Subjective: Non Verbal.  No overnight events.     MEDICATIONS  (STANDING):  ALBUTerol    90 MICROgram(s) HFA Inhaler 2 Puff(s) Inhalation every 6 hours  cadexomer iodine 0.9% Gel 1 Application(s) Topical <User Schedule>  chlorhexidine 0.12% Liquid 15 milliLiter(s) Oral Mucosa every 12 hours  chlorhexidine 2% Cloths 1 Application(s) Topical two times a day  dextrose 5%. 1000 milliLiter(s) (50 mL/Hr) IV Continuous <Continuous>  dextrose 5%. 1000 milliLiter(s) (100 mL/Hr) IV Continuous <Continuous>  dextrose 50% Injectable 25 Gram(s) IV Push once  dextrose 50% Injectable 12.5 Gram(s) IV Push once  dextrose 50% Injectable 25 Gram(s) IV Push once  glucagon  Injectable 1 milliGRAM(s) IntraMuscular once  heparin   Injectable 5000 Unit(s) SubCutaneous every 12 hours  insulin glargine Injectable (LANTUS) 5 Unit(s) SubCutaneous every morning  insulin lispro (ADMELOG) corrective regimen sliding scale   SubCutaneous every 6 hours  ipratropium 17 MICROgram(s) HFA Inhaler 1 Puff(s) Inhalation every 6 hours  LORazepam     Tablet 1 milliGRAM(s) Oral every 6 hours  midodrine 10 milliGRAM(s) Oral every 8 hours  pantoprazole    Tablet 40 milliGRAM(s) Oral before breakfast  povidone iodine 10% Solution 1 Application(s) Topical daily  simethicone 80 milliGRAM(s) Chew every 12 hours    MEDICATIONS  (PRN):  acetaminophen     Tablet .. 650 milliGRAM(s) Oral every 6 hours PRN Temp greater or equal to 38C (100.4F), Mild Pain (1 - 3)  aluminum hydroxide/magnesium hydroxide/simethicone Suspension 30 milliLiter(s) Oral every 4 hours PRN Dyspepsia  dextrose Oral Gel 15 Gram(s) Oral once PRN Blood Glucose LESS THAN 70 milliGRAM(s)/deciliter  LORazepam   Injectable 1 milliGRAM(s) IV Push every 4 hours PRN Agitation  melatonin 3 milliGRAM(s) Oral at bedtime PRN Insomnia  metoclopramide Injectable 10 milliGRAM(s) IV Push every 6 hours PRN nausea  ondansetron Injectable 4 milliGRAM(s) IV Push every 8 hours PRN Nausea and/or Vomiting      Allergies    codeine (Hives)    Intolerances        Vital Signs Last 24 Hrs  T(C): 37.3 (31 Oct 2022 06:00), Max: 37.3 (30 Oct 2022 16:27)  T(F): 99.1 (31 Oct 2022 06:00), Max: 99.1 (30 Oct 2022 16:27)  HR: 70 (31 Oct 2022 06:54) (65 - 115)  BP: 107/75 (31 Oct 2022 06:00) (89/59 - 223/125)  BP(mean): 86 (31 Oct 2022 06:00) (64 - 151)  RR: 18 (31 Oct 2022 06:00) (17 - 34)  SpO2: 100% (31 Oct 2022 06:54) (90% - 100%)    Parameters below as of 31 Oct 2022 06:54  Patient On (Oxygen Delivery Method): ventilator,.50        PHYSICAL EXAM:  GENERAL: NAD, Non Verbal  HEAD:  Atraumatic, Normocephalic  ENMT: Trach to Vent   NECK: Supple, No JVD, Normal thyroid  CHEST/LUNG: Clear to auscultation bilaterally; No rales, rhonchi, wheezing, or rubs  HEART: Regular rate and rhythm; No murmurs, rubs, or gallops  ABDOMEN: Soft, Nontender, PEG Tube +   EXTREMITIES:  2+ Peripheral Pulses, No clubbing, cyanosis, or edema      LABS:                        7.2    8.82  )-----------( 159      ( 31 Oct 2022 05:30 )             24.6     31 Oct 2022 05:30    135    |  101    |  26     ----------------------------<  131    4.8     |  27     |  1.01     Ca    8.2        31 Oct 2022 05:30  Mg     2.0       31 Oct 2022 05:30    TPro  6.5    /  Alb  1.5    /  TBili  0.2    /  DBili  x      /  AST  16     /  ALT  11     /  AlkPhos  130    31 Oct 2022 05:30        CAPILLARY BLOOD GLUCOSE      POCT Blood Glucose.: 133 mg/dL (31 Oct 2022 06:11)  POCT Blood Glucose.: 261 mg/dL (31 Oct 2022 00:01)  POCT Blood Glucose.: 152 mg/dL (30 Oct 2022 17:39)  POCT Blood Glucose.: 170 mg/dL (30 Oct 2022 11:21)      RADIOLOGY & ADDITIONAL TESTS:    Imaging Personally Reviewed:  [ ] YES     Consultant(s) Notes Reviewed:      Care Discussed with Consultants/Other Providers:    Advanced Directives: [ ] DNR  [ ] No feeding tube  [ ] MOLST in chart  [ ] MOLST completed today  [ ] Unknown

## 2022-10-31 NOTE — PROGRESS NOTE ADULT - ASSESSMENT
75yo F with PMH of dementia, COPD with chronic resp failure and is vent dependent, s/p PEG, hx of cardiac arrest, diastolic CHF, cor pulmonale, hx of CVA, COVID-19 infxn, CKD, anemia, multiple admissions for respiratory failure (recent admissions in 06/2022 and 08/2022 diagnosed with PNA with parapneumonic pleural effusion s/p thoracentesis and Avycaz) who presents from [facility] for respiratory distress again a/w sepsis and acute on chronic hypoxic respiratory failure due to suspected recurrent VAP.    Severe sepsis and acute hypoxic respiratory failure 2/2 gram-negative PNA   - titrating down FiO2 on vent - have had success maintaining saturation on 40% FiO2 now -- continue to taper down as able  - completed 7-day course of levaquin per ID for PNA (Stenotrophomonas on sputum culture) ; and 10-day course of rocephin for potential CAUTI  - ID ([facility]), recs appreciated   - procalcitonin high but improving 23 ->6.6 within a couple of days of starting Abx and now down to 0.47 on 10/27/22  - cautious use of fluids given hx of CHF and severe hypoalbuminemia (received 1750cc of NS in ED)  - blood cultures (NGTD), urine culture growing E coli sensitive to rocephin  - trach sputum culture - grew Stenotrophomonas sensitive to levaquin  - Checked CT Abd/P to eval for any other potential source sign of infection and found no significant sign of intra-abdominal infection on CT  - c/w midodrine with hold parameters -- consider titrating dose if BP elevated  - cc/pulm consult (Jaime), recs appreciated  - palliative care (Emre), recs appreciated - pt is full code  - leukocytosis resolved    Diastolic CHF  Hx of Pleural effusions  - last TTE was 5/30 with EF 65% with normal LVSF, dilated RV, and moderate pHTN -> repeat TTE appears unchanged  - pt also with severe hypoalbuminemia   - may need repeat thoracentesis (had 1.5L drained few admissions ago), pulmonology consulted; pl effusion was parapneumonic on past admission as opposed to transudative and thus less likely related to CHF    RYAN on CKD 3  - likely in part ATN due to sepsis and in part prerenal   - renal function recovered to baseline CKD 3  - renally dose medications and monitor BMP and electrolytes   - given pt has low muscle mass from being bedbound for years, this GFR estimate is likely falsely high  - monitor BMP  - hypokalemia repleted with KCl    Anemia of chronic disease  - has been evaluated by GI and hematology in the past -> dx with anemia of chronic disease with intermittent GI bleeding  - s/p 2 unit PRBC and will transfuse 1U today 10/31/22  - current Hgb generally stable ~ 7.5-8; monitor for drop and possible need for another PRBC - will order T&S for AM in case Hgb downtrends  - no jacqueline GI bleed per nursing     hx of HTN  - not on any anti-hypertensive meds at facility  - monitor VS    DM2  - NPO with TF  - c/w moderate dose lispro coverage sliding scale q6h  - goal -180 - now at goal - c/w lantus 5un sq QAM     Diet  - TF was on hold 2/2 leak around PEG site, GI readjusted PEG and not leaking currently -- c/w tube feeds per dietician recs  - GI (Leia/ group), recs appreciated     Preventive measures  - cont with heparin subq for DVT ppx  - Full Code     Disp  -Will transfuse 1U PRBC today and if appropriate response and HgB stable can plan for discharge in 24 hour

## 2022-10-31 NOTE — PROGRESS NOTE ADULT - ASSESSMENT
Pt is a 76W w/ PMHx of DM2, COPD, HTN, dementia, hx of subarachnoid hemorrhage, and history of chronic trach brought in by ambulance for evaluation of low CO2 and cyanotic appearance.   Well-known and has had multiple admissions for Acute on chronic RF and PNA w/ MDROs    Acute VAP/PNA/Acute on chronic HF/CAUTI/UTI  - pt p/w cyanosis  - most recent admission in end of August, most recent cx w/ Pseudomonas and Stenotrophomonas  - CT chest Groundglass opacities and interlobular septal thickening suggestive of CHF. 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 Please call with any questions.   Andressa Brantley M.D.  [facility] Division of Infectious Diseases [phone]

## 2022-10-31 NOTE — PROGRESS NOTE ADULT - SUBJECTIVE AND OBJECTIVE BOX
INTERVAL HPI/OVERNIGHT EVENTS:  No new overnight event.  No N/V/D.  Tolerating diet minimally    Allergies    codeine (Hives)    Intolerances      General:  No wt loss, fevers, chills, night sweats, fatigue,   Eyes:  Good vision, no reported pain  ENT:  No sore throat, pain, runny nose, dysphagia  CV:  No pain, palpitations, hypo/hypertension  Resp:  No dyspnea, cough, tachypnea, wheezing  GI:  No pain, No nausea, No vomiting, No diarrhea, No constipation, No weight loss, No fever, No pruritis, No rectal bleeding, No tarry stools, No dysphagia,  :  No pain, bleeding, incontinence, nocturia  Muscle:  No pain, weakness  Neuro:  No weakness, tingling, memory problems  Psych:  No fatigue, insomnia, mood problems, depression  Endocrine:  No polyuria, polydipsia, cold/heat intolerance  Heme:  No petechiae, ecchymosis, easy bruisability  Skin:  No rash, tattoos, scars, edema      PHYSICAL EXAM:   Vital Signs:  Vital Signs Last 24 Hrs  T(C): 37.3 (31 Oct 2022 06:00), Max: 37.3 (30 Oct 2022 16:27)  T(F): 99.1 (31 Oct 2022 06:00), Max: 99.1 (30 Oct 2022 16:27)  HR: 70 (31 Oct 2022 06:54) (65 - 115)  BP: 107/75 (31 Oct 2022 06:00) (89/59 - 223/125)  BP(mean): 86 (31 Oct 2022 06:00) (64 - 151)  RR: 18 (31 Oct 2022 06:00) (17 - 34)  SpO2: 100% (31 Oct 2022 06:54) (90% - 100%)    Parameters below as of 31 Oct 2022 06:54  Patient On (Oxygen Delivery Method): ventilator,.50      Daily     Daily I&O's Summary    30 Oct 2022 07:01  -  31 Oct 2022 07:00  --------------------------------------------------------  IN: 1350 mL / OUT: 1400 mL / NET: -50 mL        GENERAL:  Appears stated age, well-groomed, well-nourished, no distress  HEENT:  NC/AT,  conjunctivae clear and pink, no thyromegaly, nodules, adenopathy, no JVD, sclera -anicteric  CHEST:  Full & symmetric excursion, no increased effort, breath sounds clear  HEART:  Regular rhythm, S1, S2, no murmur/rub/S3/S4, no abdominal bruit, no edema  ABDOMEN:  Soft, non-tender, non-distended, normoactive bowel sounds,  no masses ,no hepato-splenomegaly, no signs of chronic liver disease  EXTEREMITIES:  no cyanosis,clubbing or edema  SKIN:  No rash/erythema/ecchymoses/petechiae/wounds/abscess/warm/dry  NEURO:  Alert, oriented, no asterixis, no tremor, no encephalopathy      LABS:                        7.2    8.82  )-----------( 159      ( 31 Oct 2022 05:30 )             24.6     10-31    135  |  101  |  26<H>  ----------------------------<  131<H>  4.8   |  27  |  1.01    Ca    8.2<L>      31 Oct 2022 05:30  Mg     2.0     10-31    TPro  6.5  /  Alb  1.5<L>  /  TBili  0.2  /  DBili  x   /  AST  16  /  ALT  11  /  AlkPhos  130<H>  10-31        amylase   lipase  RADIOLOGY & ADDITIONAL TESTS:

## 2022-10-31 NOTE — PROGRESS NOTE ADULT - SUBJECTIVE AND OBJECTIVE BOX
[facility], Division of Infectious Diseases  MOIZ Lora Y. Patel, S. Shah, G. [facility]  [phone]    Name: BREA BECKHAM  Age: 76y  Gender: Female  MRN: [id number]    Interval History:  Patient seen and examined at bedside this morning  No acute overnight events. Afebrile  Notes reviewed    Antibiotics:      Medications:  acetaminophen     Tablet .. 650 milliGRAM(s) Oral every 6 hours PRN  ALBUTerol    90 MICROgram(s) HFA Inhaler 2 Puff(s) Inhalation every 6 hours  aluminum hydroxide/magnesium hydroxide/simethicone Suspension 30 milliLiter(s) Oral every 4 hours PRN  cadexomer iodine 0.9% Gel 1 Application(s) Topical <User Schedule>  chlorhexidine 0.12% Liquid 15 milliLiter(s) Oral Mucosa every 12 hours  chlorhexidine 2% Cloths 1 Application(s) Topical two times a day  dextrose 5%. 1000 milliLiter(s) IV Continuous <Continuous>  dextrose 5%. 1000 milliLiter(s) IV Continuous <Continuous>  dextrose 50% Injectable 25 Gram(s) IV Push once  dextrose 50% Injectable 12.5 Gram(s) IV Push once  dextrose 50% Injectable 25 Gram(s) IV Push once  dextrose Oral Gel 15 Gram(s) Oral once PRN  glucagon  Injectable 1 milliGRAM(s) IntraMuscular once  heparin   Injectable 5000 Unit(s) SubCutaneous every 12 hours  insulin glargine Injectable (LANTUS) 5 Unit(s) SubCutaneous every morning  insulin lispro (ADMELOG) corrective regimen sliding scale   SubCutaneous every 6 hours  ipratropium 17 MICROgram(s) HFA Inhaler 1 Puff(s) Inhalation every 6 hours  LORazepam     Tablet 1 milliGRAM(s) Oral every 6 hours  LORazepam   Injectable 1 milliGRAM(s) IV Push every 4 hours PRN  melatonin 3 milliGRAM(s) Oral at bedtime PRN  metoclopramide Injectable 10 milliGRAM(s) IV Push every 6 hours PRN  midodrine 10 milliGRAM(s) Oral every 8 hours  ondansetron Injectable 4 milliGRAM(s) IV Push every 8 hours PRN  pantoprazole    Tablet 40 milliGRAM(s) Oral before breakfast  povidone iodine 10% Solution 1 Application(s) Topical daily  simethicone 80 milliGRAM(s) Chew every 12 hours      Review of Systems:  unable to obtain    Allergies: codeine (Hives)    For details regarding the patient's past medical history, social history, family history, and other miscellaneous elements, please refer the initial infectious diseases consultation and/or the admitting history and physical examination for this admission.    Objective:  Vitals:   T(C): 37.3 (10-31-22 @ 06:00), Max: 37.3 (10-30-22 @ 16:27)  HR: 72 (10-31-22 @ 10:29) (65 - 115)  BP: 107/75 (10-31-22 @ 06:00) (89/59 - 223/125)  RR: 18 (10-31-22 @ 06:00) (17 - 34)  SpO2: 100% (10-31-22 @ 10:29) (90% - 100%)    Physical Examination:  General: NAD  HEENT: NC/AT  Neck: trach to vent  Lungs: MV breath sounds  Heart: Regular rate and rhythm. No murmur, rub or gallop.  Abdomen: Soft. Nondistended. Nontender.  Skin: Warm. Dry.      Laboratory Studies:  CBC:                       7.2    8.82  )-----------( 159      ( 31 Oct 2022 05:30 )             24.6     CMP: 10-31    135  |  101  |  26<H>  ----------------------------<  131<H>  4.8   |  27  |  1.01    Ca    8.2<L>      31 Oct 2022 05:30  Mg     2.0     10-31    TPro  6.5  /  Alb  1.5<L>  /  TBili  0.2  /  DBili  x   /  AST  16  /  ALT  11  /  AlkPhos  130<H>  10-31    LIVER FUNCTIONS - ( 31 Oct 2022 05:30 )  Alb: 1.5 g/dL / Pro: 6.5 g/dL / ALK PHOS: 130 U/L / ALT: 11 U/L DA / AST: 16 U/L / GGT: x               Microbiology: reviewed    Culture - Sputum (collected 10-19-22 @ 00:58)  Source: .Sputum Sputum trap  Gram Stain (10-19-22 @ 19:24):    Few Squamous epithelial cells per low power field    Few polymorphonuclear leukocytes per low power field    Few Gram positive cocci in pairs per oil power field  Final Report (10-22-22 @ 17:55):    Moderate Mixed gram negative rods including    Moderate Stenotrophomonas maltophilia  Organism: Stenotrophomonas maltophilia (10-22-22 @ 17:56)      -  Minocycline: S [id number]      Method Type: KB  Organism: Stenotrophomonas maltophilia (10-22-22 @ 17:56)      -  Levofloxacin: S 2      -  Trimethoprim/Sulfamethoxazole: S <=0.5/9.5      Method Type: PRATIK  Organism: Stenotrophomonas maltophilia (10-22-22 @ 17:55)    Culture - Urine (collected 10-18-22 @ 12:27)  Source: Clean Catch Clean Catch (Midstream)  Final Report (10-21-22 @ 08:18):    >100,000 CFU/ml Escherichia coli  Organism: Escherichia coli (10-21-22 @ 08:18)  Organism: Escherichia coli (10-21-22 @ 08:18)      -  Amikacin: S <=16      -  Amoxicillin/Clavulanic Acid: S <=8/4      -  Ampicillin: R >16 These ampicillin results predict results for amoxicillin      -  Ampicillin/Sulbactam: I 16/8 Enterobacter, Klebsiella aerogenes, Citrobacter, and Serratia may develop resistance during prolonged therapy (3-4 days)      -  Aztreonam: S <=4      -  Cefazolin: S <=2 For uncomplicated UTI with K. pneumoniae, E. coli, or P. mirablis: PRATIK <=16 is sensitive and PRATIK >=32 is resistant. This also predicts results for oral agents cefaclor, cefdinir, cefpodoxime, cefprozil, cefuroxime axetil, cephalexin and locarbef for uncomplicated UTI. Note that some isolates may be susceptible to these agents while testing resistant to cefazolin.      -  Cefepime: S <=2      -  Cefoxitin: S <=8      -  Ceftriaxone: S <=1 Enterobacter, Klebsiella aerogenes, Citrobacter, and Serratia may develop resistance during prolonged therapy      -  Ciprofloxacin: R >2      -  Ertapenem: I 1      -  Gentamicin: R >8      -  Imipenem: S <=1      -  Levofloxacin: R >4      -  Meropenem: S <=1      -  Nitrofurantoin: S <=32 Should not be used to treat pyelonephritis      -  Piperacillin/Tazobactam: S <=8      -  Tigecycline: S <=2      -  Tobramycin: I 8      -  Trimethoprim/Sulfamethoxazole: R >2/38      Method Type: PRATIK    Culture - Blood (collected 10-18-22 @ 12:00)  Source: .Blood Blood-Peripheral  Final Report (10-23-22 @ 17:00):    No Growth Final    Culture - Blood (collected 10-18-22 @ 12:00)  Source: .Blood Blood-Peripheral  Final Report (10-23-22 @ 17:00):    No Growth Final          Radiology: reviewed

## 2022-10-31 NOTE — PROGRESS NOTE ADULT - SUBJECTIVE AND OBJECTIVE BOX
ICU Progress Note    HPI:    S:    Pt seen and examined  HD #  Pt here for      Allergies    codeine (Hives)    Intolerances        MEDICATIONS  (STANDING):  ALBUTerol    90 MICROgram(s) HFA Inhaler 2 Puff(s) Inhalation every 6 hours  cadexomer iodine 0.9% Gel 1 Application(s) Topical <User Schedule>  chlorhexidine 0.12% Liquid 15 milliLiter(s) Oral Mucosa every 12 hours  chlorhexidine 2% Cloths 1 Application(s) Topical two times a day  dextrose 5%. 1000 milliLiter(s) (100 mL/Hr) IV Continuous <Continuous>  dextrose 5%. 1000 milliLiter(s) (50 mL/Hr) IV Continuous <Continuous>  dextrose 50% Injectable 25 Gram(s) IV Push once  dextrose 50% Injectable 12.5 Gram(s) IV Push once  dextrose 50% Injectable 25 Gram(s) IV Push once  glucagon  Injectable 1 milliGRAM(s) IntraMuscular once  heparin   Injectable 5000 Unit(s) SubCutaneous every 12 hours  insulin glargine Injectable (LANTUS) 5 Unit(s) SubCutaneous every morning  insulin lispro (ADMELOG) corrective regimen sliding scale   SubCutaneous every 6 hours  ipratropium 17 MICROgram(s) HFA Inhaler 1 Puff(s) Inhalation every 6 hours  LORazepam     Tablet 1 milliGRAM(s) Oral every 6 hours  midodrine 10 milliGRAM(s) Oral every 8 hours  pantoprazole    Tablet 40 milliGRAM(s) Oral before breakfast  povidone iodine 10% Solution 1 Application(s) Topical daily  simethicone 80 milliGRAM(s) Chew every 12 hours    MEDICATIONS  (PRN):  acetaminophen     Tablet .. 650 milliGRAM(s) Oral every 6 hours PRN Temp greater or equal to 38C (100.4F), Mild Pain (1 - 3)  aluminum hydroxide/magnesium hydroxide/simethicone Suspension 30 milliLiter(s) Oral every 4 hours PRN Dyspepsia  dextrose Oral Gel 15 Gram(s) Oral once PRN Blood Glucose LESS THAN 70 milliGRAM(s)/deciliter  LORazepam   Injectable 1 milliGRAM(s) IV Push every 4 hours PRN Agitation  melatonin 3 milliGRAM(s) Oral at bedtime PRN Insomnia  metoclopramide Injectable 10 milliGRAM(s) IV Push every 6 hours PRN nausea  ondansetron Injectable 4 milliGRAM(s) IV Push every 8 hours PRN Nausea and/or Vomiting      Drug Dosing Weight  Height (cm): 165.1 (18 Oct 2022 11:15)  Weight (kg): 49.1 (18 Oct 2022 15:00)  BMI (kg/m2): 18 (18 Oct 2022 15:00)  BSA (m2): 1.52 (18 Oct 2022 15:00)    PAST MEDICAL & SURGICAL HISTORY:  Dementia of frontal lobe type      Aphasic stroke      Diabetes mellitus      Respiratory failure      Hypertension      GERD (gastroesophageal reflux disease)      Constipation      Respiratory failure      CVA (cerebral vascular accident)      HTN (hypertension)      DM (diabetes mellitus)      Advanced dementia      COVID-19 virus detected      Quadriplegia      Pneumonia      Type II diabetes mellitus      Hx of appendectomy      Gastrostomy in place      Tracheostomy in place      Tracheostomy tube present      Feeding by G-tube          FAMILY HISTORY:      ROS: See HPI; otherwise, all systems reviewed and negative.    O:    ICU Vital Signs Last 24 Hrs  T(C): 37.3 (31 Oct 2022 12:34), Max: 37.3 (31 Oct 2022 00:08)  T(F): 99.1 (31 Oct 2022 12:34), Max: 99.1 (31 Oct 2022 00:08)  HR: 86 (31 Oct 2022 19:10) (61 - 115)  BP: 122/85 (31 Oct 2022 18:00) (89/59 - 223/125)  BP(mean): 98 (31 Oct 2022 18:00) (64 - 151)  ABP: --  ABP(mean): --  RR: 25 (31 Oct 2022 18:00) (17 - 34)  SpO2: 97% (31 Oct 2022 19:10) (90% - 100%)    O2 Parameters below as of 31 Oct 2022 18:00  Patient On (Oxygen Delivery Method): ventilator    O2 Concentration (%): 50            I&O's Detail    30 Oct 2022 07:01  -  31 Oct 2022 07:00  --------------------------------------------------------  IN:    Free Water: 460 mL    Glucerna 1.5: 960 mL  Total IN: 1420 mL    OUT:    Indwelling Catheter - Urethral (mL): 1400 mL  Total OUT: 1400 mL    Total NET: 20 mL      31 Oct 2022 07:01  -  31 Oct 2022 20:16  --------------------------------------------------------  IN:    Free Water: 360 mL    Glucerna 1.5: 480 mL  Total IN: 840 mL    OUT:  Total OUT: 0 mL    Total NET: 840 mL          Mode: AC/ CMV (Assist Control/ Continuous Mandatory Ventilation)  RR (machine): 18  TV (machine): 400  FiO2: 50  PEEP: 5  ITime: 1  MAP: 18  PIP: 40      PE:    Constitutional: Healthy M lying in bed in NAD.   Neck: No JVD, trachea midline.   Respiratory: CTA B/L good BS B/L no W/R/R.  Cardiovascular: S1S2+ RRR no M/R/G.  Gastrointestinal: Soft, NTND.  Extremities: No peripheral edema, No cyanosis, clubbing.  Neurological: Awake, conversive, alert, no gross deficits.  Skin: No rashes, warm, moist.    LABS:    CBC Full  -  ( 31 Oct 2022 17:31 )  WBC Count : x  RBC Count : x  Hemoglobin : 9.0 g/dL  Hematocrit : x  Platelet Count - Automated : x  Mean Cell Volume : x  Mean Cell Hemoglobin : x  Mean Cell Hemoglobin Concentration : x  Auto Neutrophil # : x  Auto Lymphocyte # : x  Auto Monocyte # : x  Auto Eosinophil # : x  Auto Basophil # : x  Auto Neutrophil % : x  Auto Lymphocyte % : x  Auto Monocyte % : x  Auto Eosinophil % : x  Auto Basophil % : x    10-31    135  |  101  |  26<H>  ----------------------------<  131<H>  4.8   |  27  |  1.01    Ca    8.2<L>      31 Oct 2022 05:30  Mg     2.0     10-31    TPro  6.5  /  Alb  1.5<L>  /  TBili  0.2  /  DBili  x   /  AST  16  /  ALT  11  /  AlkPhos  130<H>  10-31        CAPILLARY BLOOD GLUCOSE      POCT Blood Glucose.: 179 mg/dL (31 Oct 2022 17:12)  POCT Blood Glucose.: 139 mg/dL (31 Oct 2022 11:53)  POCT Blood Glucose.: 133 mg/dL (31 Oct 2022 06:11)  POCT Blood Glucose.: 261 mg/dL (31 Oct 2022 00:01)        LIVER FUNCTIONS - ( 31 Oct 2022 05:30 )  Alb: 1.5 g/dL / Pro: 6.5 g/dL / ALK PHOS: 130 U/L / ALT: 11 U/L DA / AST: 16 U/L / GGT: x               A:    76yFemale  HD #    Here for:    1.    P:    Neuro: GCS 15. Monitor for delirium.  Continue to optimize pain control. Serial Neurologic assessments.    HEENT: No issues.    CV: Continue hemodynamic monitoring    Pulm: Pulmonary toilet.  Continue incentive spirometer.  Chest PT.  Encourage OOB to chair and ambulation. Nebs. f/u ABG, CXR.    GI/Nutrition: Cont diet, bowel regimen.    /Renal: Monitor UOP. Monitor BMP.  Replete Lytes as needed.    HEME- Chemical and mechanical DVT ppx. f/u CBC. f/u coags as needed.    ID:  Cont abx. f/u Cx's.    Lines/Tubes:     Endo: Maintain euglycemia.    Skin:  Cont skin care, pressure ulcer prevention.    Dispo: Cont care.       ICU Progress Note    S: no new events. Pending dispo back to facility.     Pt seen and examined  HD #  Pt here for      Allergies    codeine (Hives)    Intolerances        MEDICATIONS  (STANDING):  ALBUTerol    90 MICROgram(s) HFA Inhaler 2 Puff(s) Inhalation every 6 hours  cadexomer iodine 0.9% Gel 1 Application(s) Topical <User Schedule>  chlorhexidine 0.12% Liquid 15 milliLiter(s) Oral Mucosa every 12 hours  chlorhexidine 2% Cloths 1 Application(s) Topical two times a day  dextrose 5%. 1000 milliLiter(s) (100 mL/Hr) IV Continuous <Continuous>  dextrose 5%. 1000 milliLiter(s) (50 mL/Hr) IV Continuous <Continuous>  dextrose 50% Injectable 25 Gram(s) IV Push once  dextrose 50% Injectable 12.5 Gram(s) IV Push once  dextrose 50% Injectable 25 Gram(s) IV Push once  glucagon  Injectable 1 milliGRAM(s) IntraMuscular once  heparin   Injectable 5000 Unit(s) SubCutaneous every 12 hours  insulin glargine Injectable (LANTUS) 5 Unit(s) SubCutaneous every morning  insulin lispro (ADMELOG) corrective regimen sliding scale   SubCutaneous every 6 hours  ipratropium 17 MICROgram(s) HFA Inhaler 1 Puff(s) Inhalation every 6 hours  LORazepam     Tablet 1 milliGRAM(s) Oral every 6 hours  midodrine 10 milliGRAM(s) Oral every 8 hours  pantoprazole    Tablet 40 milliGRAM(s) Oral before breakfast  povidone iodine 10% Solution 1 Application(s) Topical daily  simethicone 80 milliGRAM(s) Chew every 12 hours    MEDICATIONS  (PRN):  acetaminophen     Tablet .. 650 milliGRAM(s) Oral every 6 hours PRN Temp greater or equal to 38C (100.4F), Mild Pain (1 - 3)  aluminum hydroxide/magnesium hydroxide/simethicone Suspension 30 milliLiter(s) Oral every 4 hours PRN Dyspepsia  dextrose Oral Gel 15 Gram(s) Oral once PRN Blood Glucose LESS THAN 70 milliGRAM(s)/deciliter  LORazepam   Injectable 1 milliGRAM(s) IV Push every 4 hours PRN Agitation  melatonin 3 milliGRAM(s) Oral at bedtime PRN Insomnia  metoclopramide Injectable 10 milliGRAM(s) IV Push every 6 hours PRN nausea  ondansetron Injectable 4 milliGRAM(s) IV Push every 8 hours PRN Nausea and/or Vomiting      Drug Dosing Weight  Height (cm): 165.1 (18 Oct 2022 11:15)  Weight (kg): 49.1 (18 Oct 2022 15:00)  BMI (kg/m2): 18 (18 Oct 2022 15:00)  BSA (m2): 1.52 (18 Oct 2022 15:00)    PAST MEDICAL & SURGICAL HISTORY:  Dementia of frontal lobe type      Aphasic stroke      Diabetes mellitus      Respiratory failure      Hypertension      GERD (gastroesophageal reflux disease)      Constipation      Respiratory failure      CVA (cerebral vascular accident)      HTN (hypertension)      DM (diabetes mellitus)      Advanced dementia      COVID-19 virus detected      Quadriplegia      Pneumonia      Type II diabetes mellitus      Hx of appendectomy      Gastrostomy in place      Tracheostomy in place      Tracheostomy tube present      Feeding by G-tube          FAMILY HISTORY:      ROS: See HPI; otherwise, all systems reviewed and negative.    O:    ICU Vital Signs Last 24 Hrs  T(C): 37.3 (31 Oct 2022 12:34), Max: 37.3 (31 Oct 2022 00:08)  T(F): 99.1 (31 Oct 2022 12:34), Max: 99.1 (31 Oct 2022 00:08)  HR: 86 (31 Oct 2022 19:10) (61 - 115)  BP: 122/85 (31 Oct 2022 18:00) (89/59 - 223/125)  BP(mean): 98 (31 Oct 2022 18:00) (64 - 151)  ABP: --  ABP(mean): --  RR: 25 (31 Oct 2022 18:00) (17 - 34)  SpO2: 97% (31 Oct 2022 19:10) (90% - 100%)    O2 Parameters below as of 31 Oct 2022 18:00  Patient On (Oxygen Delivery Method): ventilator    O2 Concentration (%): 50            I&O's Detail    30 Oct 2022 07:01  -  31 Oct 2022 07:00  --------------------------------------------------------  IN:    Free Water: 460 mL    Glucerna 1.5: 960 mL  Total IN: 1420 mL    OUT:    Indwelling Catheter - Urethral (mL): 1400 mL  Total OUT: 1400 mL    Total NET: 20 mL      31 Oct 2022 07:01  -  31 Oct 2022 20:16  --------------------------------------------------------  IN:    Free Water: 360 mL    Glucerna 1.5: 480 mL  Total IN: 840 mL    OUT:  Total OUT: 0 mL    Total NET: 840 mL          Mode: AC/ CMV (Assist Control/ Continuous Mandatory Ventilation)  RR (machine): 18  TV (machine): 400  FiO2: 50  PEEP: 5  ITime: 1  MAP: 18  PIP: 40      PE:    Constitutional: Healthy M lying in bed in NAD.   Neck: No JVD, trachea midline.   Respiratory: CTA B/L good BS B/L no W/R/R.  Cardiovascular: S1S2+ RRR no M/R/G.  Gastrointestinal: Soft, NTND.  Extremities: No peripheral edema, No cyanosis, clubbing.  Neurological: Awake, conversive, alert, no gross deficits.  Skin: No rashes, warm, moist.    LABS:    CBC Full  -  ( 31 Oct 2022 17:31 )  WBC Count : x  RBC Count : x  Hemoglobin : 9.0 g/dL  Hematocrit : x  Platelet Count - Automated : x  Mean Cell Volume : x  Mean Cell Hemoglobin : x  Mean Cell Hemoglobin Concentration : x  Auto Neutrophil # : x  Auto Lymphocyte # : x  Auto Monocyte # : x  Auto Eosinophil # : x  Auto Basophil # : x  Auto Neutrophil % : x  Auto Lymphocyte % : x  Auto Monocyte % : x  Auto Eosinophil % : x  Auto Basophil % : x    10-31    135  |  101  |  26<H>  ----------------------------<  131<H>  4.8   |  27  |  1.01    Ca    8.2<L>      31 Oct 2022 05:30  Mg     2.0     10-31    TPro  6.5  /  Alb  1.5<L>  /  TBili  0.2  /  DBili  x   /  AST  16  /  ALT  11  /  AlkPhos  130<H>  10-31        CAPILLARY BLOOD GLUCOSE      POCT Blood Glucose.: 179 mg/dL (31 Oct 2022 17:12)  POCT Blood Glucose.: 139 mg/dL (31 Oct 2022 11:53)  POCT Blood Glucose.: 133 mg/dL (31 Oct 2022 06:11)  POCT Blood Glucose.: 261 mg/dL (31 Oct 2022 00:01)        LIVER FUNCTIONS - ( 31 Oct 2022 05:30 )  Alb: 1.5 g/dL / Pro: 6.5 g/dL / ALK PHOS: 130 U/L / ALT: 11 U/L DA / AST: 16 U/L / GGT: x               A:    76yFemale  HD #      Cont ativan for agitation   Midodrine for MAPs  Chronic vent. Pulm toilet. Cont nebs  Tubes feeds / PPI  DVT ppx  Course of ABX completed per ID.

## 2022-11-01 ENCOUNTER — TRANSCRIPTION ENCOUNTER (OUTPATIENT)
Age: 77
End: 2022-11-01

## 2022-11-01 LAB
ANION GAP SERPL CALC-SCNC: 7 MMOL/L — SIGNIFICANT CHANGE UP (ref 5–17)
BUN SERPL-MCNC: 29 MG/DL — HIGH (ref 7–23)
CALCIUM SERPL-MCNC: 7.9 MG/DL — LOW (ref 8.4–10.5)
CHLORIDE SERPL-SCNC: 100 MMOL/L — SIGNIFICANT CHANGE UP (ref 96–108)
CO2 SERPL-SCNC: 27 MMOL/L — SIGNIFICANT CHANGE UP (ref 22–31)
CREAT SERPL-MCNC: 0.96 MG/DL — SIGNIFICANT CHANGE UP (ref 0.5–1.3)
EGFR: 61 ML/MIN/1.73M2 — SIGNIFICANT CHANGE UP
GLUCOSE BLDC GLUCOMTR-MCNC: 156 MG/DL — HIGH (ref 70–99)
GLUCOSE BLDC GLUCOMTR-MCNC: 158 MG/DL — HIGH (ref 70–99)
GLUCOSE BLDC GLUCOMTR-MCNC: 158 MG/DL — HIGH (ref 70–99)
GLUCOSE BLDC GLUCOMTR-MCNC: 161 MG/DL — HIGH (ref 70–99)
GLUCOSE BLDC GLUCOMTR-MCNC: 175 MG/DL — HIGH (ref 70–99)
GLUCOSE BLDC GLUCOMTR-MCNC: 176 MG/DL — HIGH (ref 70–99)
GLUCOSE BLDC GLUCOMTR-MCNC: 179 MG/DL — HIGH (ref 70–99)
GLUCOSE SERPL-MCNC: 145 MG/DL — HIGH (ref 70–99)
HCT VFR BLD CALC: 30.1 % — LOW (ref 34.5–45)
HGB BLD-MCNC: 9.1 G/DL — LOW (ref 11.5–15.5)
MCHC RBC-ENTMCNC: 27.5 PG — SIGNIFICANT CHANGE UP (ref 27–34)
MCHC RBC-ENTMCNC: 30.2 GM/DL — LOW (ref 32–36)
MCV RBC AUTO: 90.9 FL — SIGNIFICANT CHANGE UP (ref 80–100)
NRBC # BLD: 0 /100 WBCS — SIGNIFICANT CHANGE UP (ref 0–0)
PLATELET # BLD AUTO: 169 K/UL — SIGNIFICANT CHANGE UP (ref 150–400)
POTASSIUM SERPL-MCNC: 4.7 MMOL/L — SIGNIFICANT CHANGE UP (ref 3.5–5.3)
POTASSIUM SERPL-SCNC: 4.7 MMOL/L — SIGNIFICANT CHANGE UP (ref 3.5–5.3)
RBC # BLD: 3.31 M/UL — LOW (ref 3.8–5.2)
RBC # FLD: 18.3 % — HIGH (ref 10.3–14.5)
SODIUM SERPL-SCNC: 134 MMOL/L — LOW (ref 135–145)
WBC # BLD: 11.33 K/UL — HIGH (ref 3.8–10.5)
WBC # FLD AUTO: 11.33 K/UL — HIGH (ref 3.8–10.5)

## 2022-11-01 PROCEDURE — 99233 SBSQ HOSP IP/OBS HIGH 50: CPT

## 2022-11-01 RX ADMIN — Medication 1 PUFF(S): at 07:48

## 2022-11-01 RX ADMIN — Medication 1 MILLIGRAM(S): at 18:32

## 2022-11-01 RX ADMIN — Medication 1 APPLICATION(S): at 13:08

## 2022-11-01 RX ADMIN — CHLORHEXIDINE GLUCONATE 1 APPLICATION(S): 213 SOLUTION TOPICAL at 05:24

## 2022-11-01 RX ADMIN — CHLORHEXIDINE GLUCONATE 15 MILLILITER(S): 213 SOLUTION TOPICAL at 18:22

## 2022-11-01 RX ADMIN — SIMETHICONE 80 MILLIGRAM(S): 80 TABLET, CHEWABLE ORAL at 05:24

## 2022-11-01 RX ADMIN — HEPARIN SODIUM 5000 UNIT(S): 5000 INJECTION INTRAVENOUS; SUBCUTANEOUS at 05:25

## 2022-11-01 RX ADMIN — MIDODRINE HYDROCHLORIDE 10 MILLIGRAM(S): 2.5 TABLET ORAL at 05:24

## 2022-11-01 RX ADMIN — HEPARIN SODIUM 5000 UNIT(S): 5000 INJECTION INTRAVENOUS; SUBCUTANEOUS at 18:23

## 2022-11-01 RX ADMIN — ALBUTEROL 2 PUFF(S): 90 AEROSOL, METERED ORAL at 01:56

## 2022-11-01 RX ADMIN — Medication 1 MILLIGRAM(S): at 23:07

## 2022-11-01 RX ADMIN — MIDODRINE HYDROCHLORIDE 10 MILLIGRAM(S): 2.5 TABLET ORAL at 21:22

## 2022-11-01 RX ADMIN — Medication 2: at 23:07

## 2022-11-01 RX ADMIN — Medication 2: at 18:31

## 2022-11-01 RX ADMIN — SIMETHICONE 80 MILLIGRAM(S): 80 TABLET, CHEWABLE ORAL at 18:19

## 2022-11-01 RX ADMIN — MIDODRINE HYDROCHLORIDE 10 MILLIGRAM(S): 2.5 TABLET ORAL at 13:11

## 2022-11-01 RX ADMIN — Medication 1 MILLIGRAM(S): at 18:19

## 2022-11-01 RX ADMIN — Medication 1 PUFF(S): at 01:56

## 2022-11-01 RX ADMIN — Medication 1 MILLIGRAM(S): at 13:07

## 2022-11-01 RX ADMIN — Medication 2: at 06:01

## 2022-11-01 RX ADMIN — Medication 1 MILLIGRAM(S): at 05:24

## 2022-11-01 RX ADMIN — ALBUTEROL 2 PUFF(S): 90 AEROSOL, METERED ORAL at 21:05

## 2022-11-01 RX ADMIN — ALBUTEROL 2 PUFF(S): 90 AEROSOL, METERED ORAL at 13:30

## 2022-11-01 RX ADMIN — Medication 2: at 13:08

## 2022-11-01 RX ADMIN — INSULIN GLARGINE 5 UNIT(S): 100 INJECTION, SOLUTION SUBCUTANEOUS at 08:14

## 2022-11-01 RX ADMIN — CHLORHEXIDINE GLUCONATE 1 APPLICATION(S): 213 SOLUTION TOPICAL at 18:31

## 2022-11-01 RX ADMIN — CHLORHEXIDINE GLUCONATE 15 MILLILITER(S): 213 SOLUTION TOPICAL at 05:24

## 2022-11-01 RX ADMIN — Medication 1 PUFF(S): at 21:04

## 2022-11-01 RX ADMIN — ALBUTEROL 2 PUFF(S): 90 AEROSOL, METERED ORAL at 07:48

## 2022-11-01 RX ADMIN — Medication 1 PUFF(S): at 13:30

## 2022-11-01 RX ADMIN — PANTOPRAZOLE SODIUM 40 MILLIGRAM(S): 20 TABLET, DELAYED RELEASE ORAL at 06:03

## 2022-11-01 NOTE — PROGRESS NOTE ADULT - SUBJECTIVE AND OBJECTIVE BOX
Date/Time Patient Seen:  		  Referring MD:   Data Reviewed	       Patient is a 76y old  Female who presents with a chief complaint of Acute on Hypoxemic respiratory failure (31 Oct 2022 20:16)      Subjective/HPI     PAST MEDICAL & SURGICAL HISTORY:  Dementia of frontal lobe type    Aphasic stroke    Diabetes mellitus    Respiratory failure    Hypertension    GERD (gastroesophageal reflux disease)    Constipation    Respiratory failure    CVA (cerebral vascular accident)    HTN (hypertension)    DM (diabetes mellitus)    Advanced dementia    COVID-19 virus detected    Quadriplegia    Pneumonia    Type II diabetes mellitus    Hx of appendectomy    Gastrostomy in place    Tracheostomy in place    Tracheostomy tube present    Feeding by G-tube          Medication list         MEDICATIONS  (STANDING):  ALBUTerol    90 MICROgram(s) HFA Inhaler 2 Puff(s) Inhalation every 6 hours  cadexomer iodine 0.9% Gel 1 Application(s) Topical <User Schedule>  chlorhexidine 0.12% Liquid 15 milliLiter(s) Oral Mucosa every 12 hours  chlorhexidine 2% Cloths 1 Application(s) Topical two times a day  dextrose 5%. 1000 milliLiter(s) (100 mL/Hr) IV Continuous <Continuous>  dextrose 5%. 1000 milliLiter(s) (50 mL/Hr) IV Continuous <Continuous>  dextrose 50% Injectable 25 Gram(s) IV Push once  dextrose 50% Injectable 12.5 Gram(s) IV Push once  dextrose 50% Injectable 25 Gram(s) IV Push once  glucagon  Injectable 1 milliGRAM(s) IntraMuscular once  heparin   Injectable 5000 Unit(s) SubCutaneous every 12 hours  insulin glargine Injectable (LANTUS) 5 Unit(s) SubCutaneous every morning  insulin lispro (ADMELOG) corrective regimen sliding scale   SubCutaneous every 6 hours  ipratropium 17 MICROgram(s) HFA Inhaler 1 Puff(s) Inhalation every 6 hours  LORazepam     Tablet 1 milliGRAM(s) Oral every 6 hours  midodrine 10 milliGRAM(s) Oral every 8 hours  pantoprazole    Tablet 40 milliGRAM(s) Oral before breakfast  povidone iodine 10% Solution 1 Application(s) Topical daily  simethicone 80 milliGRAM(s) Chew every 12 hours    MEDICATIONS  (PRN):  acetaminophen     Tablet .. 650 milliGRAM(s) Oral every 6 hours PRN Temp greater or equal to 38C (100.4F), Mild Pain (1 - 3)  aluminum hydroxide/magnesium hydroxide/simethicone Suspension 30 milliLiter(s) Oral every 4 hours PRN Dyspepsia  dextrose Oral Gel 15 Gram(s) Oral once PRN Blood Glucose LESS THAN 70 milliGRAM(s)/deciliter  LORazepam   Injectable 1 milliGRAM(s) IV Push every 4 hours PRN Agitation  melatonin 3 milliGRAM(s) Oral at bedtime PRN Insomnia  metoclopramide Injectable 10 milliGRAM(s) IV Push every 6 hours PRN nausea  ondansetron Injectable 4 milliGRAM(s) IV Push every 8 hours PRN Nausea and/or Vomiting         Vitals log        ICU Vital Signs Last 24 Hrs  T(C): 37.9 (01 Nov 2022 04:00), Max: 38.7 (01 Nov 2022 00:01)  T(F): 100.2 (01 Nov 2022 04:00), Max: 101.7 (01 Nov 2022 00:01)  HR: 69 (01 Nov 2022 05:57) (61 - 87)  BP: 120/59 (01 Nov 2022 04:00) (104/77 - 148/82)  BP(mean): 78 (01 Nov 2022 04:00) (72 - 98)  ABP: --  ABP(mean): --  RR: 18 (01 Nov 2022 04:00) (17 - 39)  SpO2: 98% (01 Nov 2022 05:57) (94% - 100%)    O2 Parameters below as of 01 Nov 2022 04:00  Patient On (Oxygen Delivery Method): ventilator    O2 Concentration (%): 50         Mode: AC/ CMV (Assist Control/ Continuous Mandatory Ventilation)  RR (machine): 18  TV (machine): 400  FiO2: 50  PEEP: 5  ITime: 1  MAP: 21  PIP: 40      Input and Output:  I&O's Detail    30 Oct 2022 07:01  -  31 Oct 2022 07:00  --------------------------------------------------------  IN:    Free Water: 460 mL    Glucerna 1.5: 960 mL  Total IN: 1420 mL    OUT:    Indwelling Catheter - Urethral (mL): 1400 mL  Total OUT: 1400 mL    Total NET: 20 mL      31 Oct 2022 07:01 - 01 Nov 2022 06:02  --------------------------------------------------------  IN:    Free Water: 660 mL    Glucerna 1.5: 880 mL  Total IN: 1540 mL    OUT:    Indwelling Catheter - Urethral (mL): 900 mL  Total OUT: 900 mL    Total NET: 640 mL          Lab Data                        9.0    x     )-----------( x        ( 31 Oct 2022 17:31 )             x        10-31    135  |  101  |  26<H>  ----------------------------<  131<H>  4.8   |  27  |  1.01    Ca    8.2<L>      31 Oct 2022 05:30  Mg     2.0     10-31    TPro  6.5  /  Alb  1.5<L>  /  TBili  0.2  /  DBili  x   /  AST  16  /  ALT  11  /  AlkPhos  130<H>  10-31            Review of Systems	      Objective     Physical Examination    heart ss2  lung dec BS  head nc  trach      Pertinent Lab findings & Imaging      Annalise:  NO   Adequate UO     I&O's Detail    30 Oct 2022 07:01  -  31 Oct 2022 07:00  --------------------------------------------------------  IN:    Free Water: 460 mL    Glucerna 1.5: 960 mL  Total IN: 1420 mL    OUT:    Indwelling Catheter - Urethral (mL): 1400 mL  Total OUT: 1400 mL    Total NET: 20 mL      31 Oct 2022 07:01  -  01 Nov 2022 06:02  --------------------------------------------------------  IN:    Free Water: 660 mL    Glucerna 1.5: 880 mL  Total IN: 1540 mL    OUT:    Indwelling Catheter - Urethral (mL): 900 mL  Total OUT: 900 mL    Total NET: 640 mL               Discussed with:     Cultures:	        Radiology

## 2022-11-01 NOTE — CHART NOTE - NSCHARTNOTEFT_GEN_A_CORE
Assessment:    Chart reviewed, events noted: As per chart "The pt is a 77yo F with PMH of dementia, COPD with chronic resp failure and is vent dependent, s/p PEG, hx of cardiac arrest, diastolic CHF, cor pulmonale, hx of CVA, COVID-19 infxn, CKD, anemia, multiple admissions for respiratory distress (recent admission from 6/1-6/9 diagnosed with PNA with parapneumonic pleural effusion s/p thoracentesis and Avycaz) who presents from [facility] for respiratory distress again a/w sepsis and respiratory failure due to suspected recurrent gram-negative PNA."    11/1  Pt due for for nutrition follow up.  Pt trach to vent.  Pt previously NPO from (10/18-10/22), TF initiated on (10/22).  Previous GI consulted and readjusted PEG 2/2 leak.  Tolerating well on current tube feed regimen: Glucerna @40ml/hr x 24 hrs + Lucas provides 90kcal and 14 gm AA; water flush 30ml/hr x24 hr to provide 720 ml free H2O. Feeds at goal rate will provide 960 ml total volume, 1440 kcal, 79 g protein, 729 ml free water. RD to remain available to adjust EN formulary, volume/rate as needed and will follow up to continue to monitor pt's nutrition status.     Factors impacting intake: [ ] none [ ] nausea  [ ] vomiting [ ] diarrhea [ ] constipation  [ ]chewing problems [ ] swallowing issues  [ x] other: peg    Diet Prescription: Diet, NPO with Tube Feed:   Tube Feeding Modality: Gastrostomy  Glucerna 1.5 Horacio  Total Volume for 24 Hours (mL): 960  Continuous  Starting Tube Feed Rate {mL per Hour}: 40  Until Goal Tube Feed Rate (mL per Hour): 40  Tube Feed Duration (in Hours): 24  Tube Feed Start Time: 14:00  Free Water Flush  Pump   Rate (mL per Hour): 30   Frequency: Every Hour    Duration (Hours): 24    Start Time: 14:00  Lucas(7 Gm Arginine/7 Gm Glut/1.2 Gm HMB     Qty per Day:  1 (10-29-22 @ 12:32)    Intake: tube feeding @goal    Current Weight:   % Weight Change  11/1 125.8#, 10/31 132.7#, 10/30 130.5#  ? accuracy of adm wt 108#; will continue to monitor and trend weights as able    Pertinent Medications: MEDICATIONS  (STANDING):  ALBUTerol    90 MICROgram(s) HFA Inhaler 2 Puff(s) Inhalation every 6 hours  cadexomer iodine 0.9% Gel 1 Application(s) Topical <User Schedule>  chlorhexidine 0.12% Liquid 15 milliLiter(s) Oral Mucosa every 12 hours  chlorhexidine 2% Cloths 1 Application(s) Topical two times a day  dextrose 5%. 1000 milliLiter(s) (50 mL/Hr) IV Continuous <Continuous>  dextrose 5%. 1000 milliLiter(s) (100 mL/Hr) IV Continuous <Continuous>  dextrose 50% Injectable 25 Gram(s) IV Push once  dextrose 50% Injectable 12.5 Gram(s) IV Push once  dextrose 50% Injectable 25 Gram(s) IV Push once  glucagon  Injectable 1 milliGRAM(s) IntraMuscular once  heparin   Injectable 5000 Unit(s) SubCutaneous every 12 hours  insulin glargine Injectable (LANTUS) 5 Unit(s) SubCutaneous every morning  insulin lispro (ADMELOG) corrective regimen sliding scale   SubCutaneous every 6 hours  ipratropium 17 MICROgram(s) HFA Inhaler 1 Puff(s) Inhalation every 6 hours  LORazepam     Tablet 1 milliGRAM(s) Oral every 6 hours  midodrine 10 milliGRAM(s) Oral every 8 hours  pantoprazole    Tablet 40 milliGRAM(s) Oral before breakfast  povidone iodine 10% Solution 1 Application(s) Topical daily  simethicone 80 milliGRAM(s) Chew every 12 hours    MEDICATIONS  (PRN):  acetaminophen     Tablet .. 650 milliGRAM(s) Oral every 6 hours PRN Temp greater or equal to 38C (100.4F), Mild Pain (1 - 3)  aluminum hydroxide/magnesium hydroxide/simethicone Suspension 30 milliLiter(s) Oral every 4 hours PRN Dyspepsia  dextrose Oral Gel 15 Gram(s) Oral once PRN Blood Glucose LESS THAN 70 milliGRAM(s)/deciliter  LORazepam   Injectable 1 milliGRAM(s) IV Push every 4 hours PRN Agitation  melatonin 3 milliGRAM(s) Oral at bedtime PRN Insomnia  metoclopramide Injectable 10 milliGRAM(s) IV Push every 6 hours PRN nausea  ondansetron Injectable 4 milliGRAM(s) IV Push every 8 hours PRN Nausea and/or Vomiting    Pertinent Labs: 11-01 Na134 mmol/L<L> Glu 145 mg/dL<H> K+ 4.7 mmol/L Cr  0.96 mg/dL BUN 29 mg/dL<H> 10-28 Phos 4.2 mg/dL 10-31 Alb 1.5 g/dL<L>     CAPILLARY BLOOD GLUCOSE      POCT Blood Glucose.: 175 mg/dL (01 Nov 2022 13:06)  POCT Blood Glucose.: 156 mg/dL (01 Nov 2022 08:12)  POCT Blood Glucose.: 158 mg/dL (01 Nov 2022 05:59)  POCT Blood Glucose.: 158 mg/dL (01 Nov 2022 05:59)  POCT Blood Glucose.: 179 mg/dL (31 Oct 2022 23:38)  POCT Blood Glucose.: 179 mg/dL (31 Oct 2022 17:12)    Skin: stage II to sacrum, stage IV to gluteal fold     Estimated Needs:   [x ] no change since previous assessment  [ ] recalculated:     Previous Nutrition Diagnosis:   [ ] Inadequate Energy Intake [ ]Inadequate Oral Intake [ ] Excessive Energy Intake   [ ] Underweight [x] Increased Nutrient Needs [ ] Overweight/Obesity   [ ] Altered GI Function [ ] Unintended Weight Loss [ ] Food & Nutrition Related Knowledge Deficit [ ] Malnutrition     Nutrition Diagnosis is [x ] ongoing  [ ] resolved [ ] not applicable     New Nutrition Diagnosis: [ ] not applicable       Interventions: peg feedings- glucerna 1.5 to goal 40ml with modular protein supplements  Recommend  [ ] Change Diet To:  [ ] Nutrition Supplement  [ ] Nutrition Support  [ ] Other:     Monitoring and Evaluation:   [ ] PO intake [ x ] Tolerance to EN prescription [ x ] weights [ x ] labs[ x ] follow up per protocol  [ ] other:

## 2022-11-01 NOTE — PROGRESS NOTE ADULT - ASSESSMENT
REVIEW OF SYMPTOMS      Able to give (reliable) ROS  NO     PHYSICAL EXAM    HEENT Unremarkable  atraumatic   RESP Fair air entry EXP prolonged    Harsh breath sound Resp distres mild   CARDIAC S1 S2 No S3     NO JVD    ABDOMEN SOFT BS PRESENT NOT DISTENDED No hepatosplenomegaly   PEDAL EDEMA present No calf tenderness  NO rash       GENERAL DATA .   GOC.  10/18/2022 full code       ALLGY. codeine                            WT.          10/18/2022 48  BMI.          10/18/2022 17                     ICU STAY. 10/18/2022  COVID.  10/18/2022 scv2 (-)     BEST PRACTICE ISSUES.    HOB ELEVATN. Yes  DVT PPLX.   10/18/2022 hpsc    SQUIRES PPLX.  10/18/2022 protonix 40     INFN PPLX.    10/18/2022 ch;lorhexidine .12%  10/19/2022 chlorhexidine 2%    SP SW EM.        DIET.     10/19/2022 nepro 800 gt    VS/ PO/IO/ VENT/ DRIPS.  11/1/2022 70 130/80   11/1/2022 ac 18/400/5/.5     ASSESSMENT/RECOMMENDATIONS .   RESP.  -- Gas exchange.   10/18/2022 ac 18/400/100/5 751/37/257  -- VENT MANAGEMENT.   HOB elevation  Target Pplat 30 (-)  Target PO 90-95%  Target pH 730 (+)  Daily spontaneous breathing trials   Daily sedation vacation   -- Pleuralk effsn  Past ritchie   R THORAC   6/8/2022  g 161 l 222 p 4.9 p 10 l 16 .38    L THORAC   5/25/2022 g 183 l 144/381 .37 p 3.4/5.3 .64 p 23 l 36   5/25/2022l pl fluid  lymph pred exudate   cyto (-)     Ct  10/18/2022 sm r mod l effs large subpulmonic component   a/r No thoracentesis plannd at this point risk prohibitive in vent pt   -- Mediastinal lne.  Ct  10/18/2022 again enlarged mediastinal lns likely reactive   a/r will need followup with ct in 2m and consider biopsy if persists   -- wheeze.  10/18 albuterol hf  10/18 atrovent hf   INFECTION.  PAST ID ISSUES   8/19/2022  zosyn Se Vignesh  8/19/2022 bactrim ds 2 bid   pmh 5/2/2022 SERRATIA CARBAPENEM RESISTANT PSEUDOMONAS  -- VAP 10/18/2022.  -- UTI 10/18/2022   w 10/18-10/19-10/20-10/21-10/23-10/25-10/26-10/27-10/28-11/1/2022 w 13 - 8.8 - 15-10.3 - 9.4- 12.3 - 15.6- 11.6  - 10 - 8 - 11.3   pr 10/20-10/27/2022 6.6  - .4   ua 10/18/2022 w 11-25   ct ch 10/18/2022 cw 8/18/20232 ggo post upper lobes with interlobular septal thickening infiltrate or atelectasis lower lobes l more than   sm r and sm to mod l effsn  rvp 10/18/2022 (-)   uc 10/18 100K E coli   sp 10/19 Stenotrophomonas   bc 10/18 (-)   10/18/2022 meropenem Dr NEWTON  dc  10/18/2022 ID Dr Brantley on case   10/19-10/26/2022 levaquin 750 x 7 d   10/21/2022 rocephin x 10d Dr BRITTANY Brantley      CARDIAC.  -- Hypotension.  10/18/2022 88/48   echo 8/19/2022 n lvsf dd1 pasp 58   10/20 midocrine 10.3   Target MAP 65 (+)   resolvd   GI.  -- ELEVATED LFTS.  LFTS 10/18-10/19-10/20/2022   ap 140-131- 104  ast - 44  alt 21 - 103 - 64   monitor  HEMAT.  -- Anemia.  Hb 10/18-10/19-10/20-10/21-10/22-10/23-10/24-10/25-10/26-10/27-10/28-10/29-10/30-10/31-11/1/2022 Hb 7.4 -  6.3 - 8.2  - 7.4 - 8 - 8.7 - 8.4 - 8.8 - 7.7 - 8 - 7.8 - 8.3- 7.4- 7.2 - 9.1  inr 10/18/2022 inr 1.23   ctap 10/20 No rp bl  Monitor  target Hb 7 (+)   -- TRANSFUSION  10/19/2022 2 U PRBC   10/31/2022 1 u porbc     OVERALL ASSESSMENT/DISPOSITION.  76 f PMH trach peg cva cac anoxic encephalo PH 8/19/2022 pasp 58 bl pl effs  r thora 6/8/2022 and l thora 5/25/2022 were lp exudates  recurrent hospitalizations HO CR psuedomonas 5/2/2022 zosyn 8/19/2022 admitted 10/18/2022 with resp distress     VAP   UTI   uc 10/18 100K E coli   sp 10/19 Stenotrophomonas   10/19-10/26/2022 levaquin 750   10/21/2022 rocephin x 7d Dr BRITTANY Brantley   Vent mgmt   Hypotension  10/20 midocrine 10.3  Anemia  10/19/2022 2 U PRBC   10/31/2022 1 u prbc  Woody 10/23/2022    TIME SPENT   Over 39 minutes aggregate critical care time spent on encounter; activities included   direct patient care, counseling and/or coordinating care reviewing notes, lab data/ imaging , discussion with multidisciplinary team/ patient  /family and explaining in detail risks, benefits, alternatives  of the recommendations     CHAPINCITO GUIDRY 76 f [facility] S 10/18/2022   DR ANG PADGETT

## 2022-11-01 NOTE — PROGRESS NOTE ADULT - SUBJECTIVE AND OBJECTIVE BOX
[facility], Division of Infectious Diseases  MOIZ Lora Y. Patel, S. Shah, G. [facility]  [phone]    Name: BREA BECKHAM  Age: 76y  Gender: Female  MRN: [id number]    Interval History:  Patient seen and examined at bedside   No acute overnight events. Afebrile  Notes reviewed    Antibiotics:      Medications:  acetaminophen     Tablet .. 650 milliGRAM(s) Oral every 6 hours PRN  ALBUTerol    90 MICROgram(s) HFA Inhaler 2 Puff(s) Inhalation every 6 hours  aluminum hydroxide/magnesium hydroxide/simethicone Suspension 30 milliLiter(s) Oral every 4 hours PRN  cadexomer iodine 0.9% Gel 1 Application(s) Topical <User Schedule>  chlorhexidine 0.12% Liquid 15 milliLiter(s) Oral Mucosa every 12 hours  chlorhexidine 2% Cloths 1 Application(s) Topical two times a day  dextrose 5%. 1000 milliLiter(s) IV Continuous <Continuous>  dextrose 5%. 1000 milliLiter(s) IV Continuous <Continuous>  dextrose 50% Injectable 25 Gram(s) IV Push once  dextrose 50% Injectable 12.5 Gram(s) IV Push once  dextrose 50% Injectable 25 Gram(s) IV Push once  dextrose Oral Gel 15 Gram(s) Oral once PRN  glucagon  Injectable 1 milliGRAM(s) IntraMuscular once  heparin   Injectable 5000 Unit(s) SubCutaneous every 12 hours  insulin glargine Injectable (LANTUS) 5 Unit(s) SubCutaneous every morning  insulin lispro (ADMELOG) corrective regimen sliding scale   SubCutaneous every 6 hours  ipratropium 17 MICROgram(s) HFA Inhaler 1 Puff(s) Inhalation every 6 hours  LORazepam     Tablet 1 milliGRAM(s) Oral every 6 hours  LORazepam   Injectable 1 milliGRAM(s) IV Push every 4 hours PRN  melatonin 3 milliGRAM(s) Oral at bedtime PRN  metoclopramide Injectable 10 milliGRAM(s) IV Push every 6 hours PRN  midodrine 10 milliGRAM(s) Oral every 8 hours  ondansetron Injectable 4 milliGRAM(s) IV Push every 8 hours PRN  pantoprazole    Tablet 40 milliGRAM(s) Oral before breakfast  povidone iodine 10% Solution 1 Application(s) Topical daily  simethicone 80 milliGRAM(s) Chew every 12 hours      Review of Systems:   Allergies: codeine (Hives)    For details regarding the patient's past medical history, social history, family history, and other miscellaneous elements, please refer the initial infectious diseases consultation and/or the admitting history and physical examination for this admission.    Objective:  Vitals:   T(C): 37.6 (11-01-22 @ 12:26), Max: 38.7 (11-01-22 @ 00:01)  HR: 94 (11-01-22 @ 13:33) (65 - 95)  BP: 128/67 (11-01-22 @ 09:00) (109/59 - 148/82)  RR: 20 (11-01-22 @ 09:00) (17 - 39)  SpO2: 100% (11-01-22 @ 13:33) (94% - 100%)    Physical Examination:  General: NAD  HEENT: NC/AT  Neck: trach to vent  Lungs: MV breath sounds  Heart: Regular rate and rhythm. No murmur, rub or gallop.  Abdomen: Soft. Nondistended. Nontender.  Skin: Warm. Dry.      Laboratory Studies:  CBC:                       9.1    11.33 )-----------( 169      ( 01 Nov 2022 07:12 )             30.1     CMP: 11-01    134<L>  |  100  |  29<H>  ----------------------------<  145<H>  4.7   |  27  |  0.96    Ca    7.9<L>      01 Nov 2022 07:12  Mg     2.0     10-31    TPro  6.5  /  Alb  1.5<L>  /  TBili  0.2  /  DBili  x   /  AST  16  /  ALT  11  /  AlkPhos  130<H>  10-31    LIVER FUNCTIONS - ( 31 Oct 2022 05:30 )  Alb: 1.5 g/dL / Pro: 6.5 g/dL / ALK PHOS: 130 U/L / ALT: 11 U/L DA / AST: 16 U/L / GGT: x               Microbiology: reviewed    Culture - Sputum (collected 10-19-22 @ 00:58)  Source: .Sputum Sputum trap  Gram Stain (10-19-22 @ 19:24):    Few Squamous epithelial cells per low power field    Few polymorphonuclear leukocytes per low power field    Few Gram positive cocci in pairs per oil power field  Final Report (10-22-22 @ 17:55):    Moderate Mixed gram negative rods including    Moderate Stenotrophomonas maltophilia  Organism: Stenotrophomonas maltophilia (10-22-22 @ 17:56)      -  Minocycline: S [id number]      Method Type: KB  Organism: Stenotrophomonas maltophilia (10-22-22 @ 17:56)      -  Levofloxacin: S 2      -  Trimethoprim/Sulfamethoxazole: S <=0.5/9.5      Method Type: PRATIK  Organism: Stenotrophomonas maltophilia (10-22-22 @ 17:55)          Radiology: reviewed       [facility], Division of Infectious Diseases  MOIZ Lora Y. Patel, S. Shah, G. [facility]  [phone]    Name: BREA BECKHAM  Age: 76y  Gender: Female  MRN: [id number]    Interval History:  Patient seen and examined at bedside   No acute overnight events. febrile overnight   Notes reviewed    Antibiotics:      Medications:  acetaminophen     Tablet .. 650 milliGRAM(s) Oral every 6 hours PRN  ALBUTerol    90 MICROgram(s) HFA Inhaler 2 Puff(s) Inhalation every 6 hours  aluminum hydroxide/magnesium hydroxide/simethicone Suspension 30 milliLiter(s) Oral every 4 hours PRN  cadexomer iodine 0.9% Gel 1 Application(s) Topical <User Schedule>  chlorhexidine 0.12% Liquid 15 milliLiter(s) Oral Mucosa every 12 hours  chlorhexidine 2% Cloths 1 Application(s) Topical two times a day  dextrose 5%. 1000 milliLiter(s) IV Continuous <Continuous>  dextrose 5%. 1000 milliLiter(s) IV Continuous <Continuous>  dextrose 50% Injectable 25 Gram(s) IV Push once  dextrose 50% Injectable 12.5 Gram(s) IV Push once  dextrose 50% Injectable 25 Gram(s) IV Push once  dextrose Oral Gel 15 Gram(s) Oral once PRN  glucagon  Injectable 1 milliGRAM(s) IntraMuscular once  heparin   Injectable 5000 Unit(s) SubCutaneous every 12 hours  insulin glargine Injectable (LANTUS) 5 Unit(s) SubCutaneous every morning  insulin lispro (ADMELOG) corrective regimen sliding scale   SubCutaneous every 6 hours  ipratropium 17 MICROgram(s) HFA Inhaler 1 Puff(s) Inhalation every 6 hours  LORazepam     Tablet 1 milliGRAM(s) Oral every 6 hours  LORazepam   Injectable 1 milliGRAM(s) IV Push every 4 hours PRN  melatonin 3 milliGRAM(s) Oral at bedtime PRN  metoclopramide Injectable 10 milliGRAM(s) IV Push every 6 hours PRN  midodrine 10 milliGRAM(s) Oral every 8 hours  ondansetron Injectable 4 milliGRAM(s) IV Push every 8 hours PRN  pantoprazole    Tablet 40 milliGRAM(s) Oral before breakfast  povidone iodine 10% Solution 1 Application(s) Topical daily  simethicone 80 milliGRAM(s) Chew every 12 hours      Review of Systems:   Allergies: codeine (Hives)    For details regarding the patient's past medical history, social history, family history, and other miscellaneous elements, please refer the initial infectious diseases consultation and/or the admitting history and physical examination for this admission.    Objective:  Vitals:   T(C): 37.6 (11-01-22 @ 12:26), Max: 38.7 (11-01-22 @ 00:01)  HR: 94 (11-01-22 @ 13:33) (65 - 95)  BP: 128/67 (11-01-22 @ 09:00) (109/59 - 148/82)  RR: 20 (11-01-22 @ 09:00) (17 - 39)  SpO2: 100% (11-01-22 @ 13:33) (94% - 100%)    Physical Examination:  General: NAD  HEENT: NC/AT  Neck: trach to vent  Lungs: MV breath sounds  Heart: Regular rate and rhythm. No murmur, rub or gallop.  Abdomen: Soft. Nondistended. Nontender.  Skin: Warm. Dry.      Laboratory Studies:  CBC:                       9.1    11.33 )-----------( 169      ( 01 Nov 2022 07:12 )             30.1     CMP: 11-01    134<L>  |  100  |  29<H>  ----------------------------<  145<H>  4.7   |  27  |  0.96    Ca    7.9<L>      01 Nov 2022 07:12  Mg     2.0     10-31    TPro  6.5  /  Alb  1.5<L>  /  TBili  0.2  /  DBili  x   /  AST  16  /  ALT  11  /  AlkPhos  130<H>  10-31    LIVER FUNCTIONS - ( 31 Oct 2022 05:30 )  Alb: 1.5 g/dL / Pro: 6.5 g/dL / ALK PHOS: 130 U/L / ALT: 11 U/L DA / AST: 16 U/L / GGT: x               Microbiology: reviewed    Culture - Sputum (collected 10-19-22 @ 00:58)  Source: .Sputum Sputum trap  Gram Stain (10-19-22 @ 19:24):    Few Squamous epithelial cells per low power field    Few polymorphonuclear leukocytes per low power field    Few Gram positive cocci in pairs per oil power field  Final Report (10-22-22 @ 17:55):    Moderate Mixed gram negative rods including    Moderate Stenotrophomonas maltophilia  Organism: Stenotrophomonas maltophilia (10-22-22 @ 17:56)      -  Minocycline: S [id number]      Method Type: KB  Organism: Stenotrophomonas maltophilia (10-22-22 @ 17:56)      -  Levofloxacin: S 2      -  Trimethoprim/Sulfamethoxazole: S <=0.5/9.5      Method Type: PRATIK  Organism: Stenotrophomonas maltophilia (10-22-22 @ 17:55)          Radiology: reviewed

## 2022-11-01 NOTE — PROGRESS NOTE ADULT - SUBJECTIVE AND OBJECTIVE BOX
BREA BECKHAM     [facility] 01    Allergies    codeine (Hives)    Intolerances        PAST MEDICAL & SURGICAL HISTORY:  Dementia of frontal lobe type      Aphasic stroke      Diabetes mellitus      Respiratory failure      Hypertension      GERD (gastroesophageal reflux disease)      Constipation      Respiratory failure      CVA (cerebral vascular accident)      HTN (hypertension)      DM (diabetes mellitus)      Advanced dementia      COVID-19 virus detected      Quadriplegia      Pneumonia      Type II diabetes mellitus      Hx of appendectomy      Gastrostomy in place      Tracheostomy in place      Tracheostomy tube present      Feeding by G-tube          FAMILY HISTORY:      Home Medications:  albuterol 90 mcg/inh inhalation aerosol with adapter: 2  inhaled every 6 hours (18 Oct 2022 11:42)  Bacid (LAC) oral tablet: 2 tab(s) by gastrostomy tube once a day (18 Oct 2022 11:42)  Betadine 10% topical swab: cleanse right and left great toes (18 Oct 2022 11:42)  chlorhexidine 0.12% mucous membrane liquid: 15 milliliter(s) mucous membrane 2 times a day (18 Oct 2022 11:42)  Eucerin topical cream: Apply topically to affected area once a day bilateral feet (18 Oct 2022 11:42)  insulin glargine 100 units/mL subcutaneous solution: 8 unit(s) subcutaneous once a day (in the morning) (18 Oct 2022 11:42)  ipratropium-albuterol 0.5 mg-2.5 mg/3 mL inhalation solution: 3 milliliter(s) inhaled 4 times a day (18 Oct 2022 11:42)  LORazepam 1 mg oral tablet: 1 tab(s) by gastrostomy tube every 4 hours (18 Oct 2022 11:42)  methocarbamol 500 mg oral tablet: 1 tab(s) by gastrostomy tube 2 times a day (18 Oct 2022 11:42)  MiraLax oral powder for reconstitution: g-tube (18 Oct 2022 13:04)  Multiple Vitamins oral tablet: 1 tab(s) orally once a day (18 Oct 2022 11:42)  nystatin 100,000 units/g topical powder: 1 application topically 3 times a day (18 Oct 2022 11:42)  omeprazole 20 mg oral delayed release capsule: orally 2 times a day (18 Oct 2022 11:42)  polyethylene glycol 3350 oral powder for reconstitution: 17 gram(s) by gastrostomy tube every 12 hours (18 Oct 2022 11:42)  senna 8.6 mg oral tablet: 3 tab(s) by gastrostomy tube once a day (at bedtime) (18 Oct 2022 11:42)  simethicone 80 mg oral tablet: g-tube (18 Oct 2022 13:04)  simethicone 80 mg oral tablet, chewable: 1 tab(s) by gastrostomy tube every 6 hours (18 Oct 2022 11:42)  sucralfate 1 g/10 mL oral suspension: 10 milliliter(s) g-tube 4 times a day (before meals and at bedtime) (18 Oct 2022 13:04)  Tylenol 325 mg oral tablet: 2 tab(s) by gastrostomy tube once a day; 60 minutes prior to dressing change  (18 Oct 2022 11:42)  Tylenol 325 mg oral tablet: 2 tab(s) by gastrostomy tube every 6 hours, As Needed (18 Oct 2022 11:42)      MEDICATIONS  (STANDING):  ALBUTerol    90 MICROgram(s) HFA Inhaler 2 Puff(s) Inhalation every 6 hours  cadexomer iodine 0.9% Gel 1 Application(s) Topical <User Schedule>  chlorhexidine 0.12% Liquid 15 milliLiter(s) Oral Mucosa every 12 hours  chlorhexidine 2% Cloths 1 Application(s) Topical two times a day  dextrose 5%. 1000 milliLiter(s) (100 mL/Hr) IV Continuous <Continuous>  dextrose 5%. 1000 milliLiter(s) (50 mL/Hr) IV Continuous <Continuous>  dextrose 50% Injectable 25 Gram(s) IV Push once  dextrose 50% Injectable 12.5 Gram(s) IV Push once  dextrose 50% Injectable 25 Gram(s) IV Push once  glucagon  Injectable 1 milliGRAM(s) IntraMuscular once  heparin   Injectable 5000 Unit(s) SubCutaneous every 12 hours  insulin glargine Injectable (LANTUS) 5 Unit(s) SubCutaneous every morning  insulin lispro (ADMELOG) corrective regimen sliding scale   SubCutaneous every 6 hours  ipratropium 17 MICROgram(s) HFA Inhaler 1 Puff(s) Inhalation every 6 hours  LORazepam     Tablet 1 milliGRAM(s) Oral every 6 hours  midodrine 10 milliGRAM(s) Oral every 8 hours  pantoprazole    Tablet 40 milliGRAM(s) Oral before breakfast  povidone iodine 10% Solution 1 Application(s) Topical daily  simethicone 80 milliGRAM(s) Chew every 12 hours    MEDICATIONS  (PRN):  acetaminophen     Tablet .. 650 milliGRAM(s) Oral every 6 hours PRN Temp greater or equal to 38C (100.4F), Mild Pain (1 - 3)  aluminum hydroxide/magnesium hydroxide/simethicone Suspension 30 milliLiter(s) Oral every 4 hours PRN Dyspepsia  dextrose Oral Gel 15 Gram(s) Oral once PRN Blood Glucose LESS THAN 70 milliGRAM(s)/deciliter  LORazepam   Injectable 1 milliGRAM(s) IV Push every 4 hours PRN Agitation  melatonin 3 milliGRAM(s) Oral at bedtime PRN Insomnia  metoclopramide Injectable 10 milliGRAM(s) IV Push every 6 hours PRN nausea  ondansetron Injectable 4 milliGRAM(s) IV Push every 8 hours PRN Nausea and/or Vomiting      Diet, NPO with Tube Feed:   Tube Feeding Modality: Gastrostomy  Glucerna 1.5 Horacio  Total Volume for 24 Hours (mL): 960  Continuous  Starting Tube Feed Rate mL per Hour: 40  Until Goal Tube Feed Rate (mL per Hour): 40  Tube Feed Duration (in Hours): 24  Tube Feed Start Time: 14:00  Free Water Flush  Pump   Rate (mL per Hour): 30   Frequency: Every Hour    Duration (Hours): 24    Start Time: 14:00  Lucsa(7 Gm Arginine/7 Gm Glut/1.2 Gm HMB     Qty per Day:  1 (10-29-22 @ 12:32) [Active]          Vital Signs Last 24 Hrs  T(C): 37.4 (01 Nov 2022 08:30), Max: 38.7 (01 Nov 2022 00:01)  T(F): 99.4 (01 Nov 2022 08:30), Max: 101.7 (01 Nov 2022 00:01)  HR: 70 (01 Nov 2022 10:26) (64 - 87)  BP: 128/67 (01 Nov 2022 09:00) (104/77 - 148/82)  BP(mean): 84 (01 Nov 2022 09:00) (73 - 98)  RR: 20 (01 Nov 2022 09:00) (17 - 39)  SpO2: 99% (01 Nov 2022 10:26) (94% - 100%)    Parameters below as of 01 Nov 2022 08:00  Patient On (Oxygen Delivery Method): ventilator    O2 Concentration (%): 50      10-31-22 @ 07:01  -  11-01-22 @ 07:00  --------------------------------------------------------  IN: 1540 mL / OUT: 900 mL / NET: 640 mL    11-01-22 @ 07:01  -  11-01-22 @ 10:38  --------------------------------------------------------  IN: 80 mL / OUT: 350 mL / NET: -270 mL        Mode: AC/ CMV (Assist Control/ Continuous Mandatory Ventilation), RR (machine): 18, TV (machine): 400, FiO2: 50, PEEP: 5, ITime: 0.1, MAP: 17, PIP: 38      LABS:                        9.1    11.33 )-----------( 169      ( 01 Nov 2022 07:12 )             30.1     11-01    134<L>  |  100  |  29<H>  ----------------------------<  145<H>  4.7   |  27  |  0.96    Ca    7.9<L>      01 Nov 2022 07:12  Mg     2.0     10-31    TPro  6.5  /  Alb  1.5<L>  /  TBili  0.2  /  DBili  x   /  AST  16  /  ALT  11  /  AlkPhos  130<H>  10-31              WBC:  WBC Count: 11.33 K/uL (11-01 @ 07:12)  WBC Count: 8.82 K/uL (10-31 @ 05:30)  WBC Count: 7.09 K/uL (10-30 @ 07:02)  WBC Count: 10.02 K/uL (10-29 @ 06:44)      MICROBIOLOGY:  RECENT CULTURES:                  Sodium:  Sodium, Serum: 134 mmol/L (11-01 @ 07:12)  Sodium, Serum: 135 mmol/L (10-31 @ 05:30)  Sodium, Serum: 135 mmol/L (10-30 @ 07:02)  Sodium, Serum: 134 mmol/L (10-29 @ 06:44)      0.96 mg/dL 11-01 @ 07:12  1.01 mg/dL 10-31 @ 05:30  0.87 mg/dL 10-30 @ 07:02  0.86 mg/dL 10-29 @ 06:44      Hemoglobin:  Hemoglobin: 9.1 g/dL (11-01 @ 07:12)  Hemoglobin: 9.0 g/dL (10-31 @ 17:31)  Hemoglobin: 7.2 g/dL (10-31 @ 05:30)  Hemoglobin: 7.4 g/dL (10-30 @ 07:02)  Hemoglobin: 8.3 g/dL (10-29 @ 06:44)      Platelets: Platelet Count - Automated: 169 K/uL (11-01 @ 07:12)  Platelet Count - Automated: 159 K/uL (10-31 @ 05:30)  Platelet Count - Automated: 157 K/uL (10-30 @ 07:02)  Platelet Count - Automated: 169 K/uL (10-29 @ 06:44)      LIVER FUNCTIONS - ( 31 Oct 2022 05:30 )  Alb: 1.5 g/dL / Pro: 6.5 g/dL / ALK PHOS: 130 U/L / ALT: 11 U/L DA / AST: 16 U/L / GGT: x                 RADIOLOGY & ADDITIONAL STUDIES:      MICROBIOLOGY:  RECENT CULTURES:

## 2022-11-01 NOTE — DISCHARGE NOTE NURSING/CASE MANAGEMENT/SOCIAL WORK - NSDCVIVACCINE_GEN_ALL_CORE_FT
COVID-19, mRNA, LNP-S, PF, 30 mcg/0.3 mL dose (Pfizer); 20-Dec-2021 16:02; Cat Ramirez); [employer]; [id number] (Exp. Date: 30-Dec-2021); IntraMuscular; Deltoid Left.; 0.3 milliLiter(s);

## 2022-11-01 NOTE — PROGRESS NOTE ADULT - ASSESSMENT
04-Dec-2020 00:03 76F with dementia, COPD with chronic resp failure and is vent dependent, s/p PEG, hx of cardiac arrest, CHF, cor pulmonale, CVA, COVID-19 infxn, CKD, anemia, multiple admissions    dementia  COPD  chr resp failure  vegetative state  dysphagia  peg  hx of card arrest - anoxic brain  CHF  cva    Fio2 titration in progress for preparation to DC back to SNF   s/p ABX - low grade temp -   CCM notes reviewed  vs noted  labs reviewed  Wound care in progress -   GI consult noted    Diagnosis; Prognosis; MOLST Discussed  Marciano -   HCP  pt is full code  spoke with  -

## 2022-11-01 NOTE — DISCHARGE NOTE NURSING/CASE MANAGEMENT/SOCIAL WORK - PATIENT PORTAL LINK FT
You can access the FollowMyHealth Patient Portal offered by [facility] by registering at the following website: [email or web address]. By joining [employer]’s FollowMyHealth portal, you will also be able to view your health information using other applications (apps) compatible with our system.

## 2022-11-01 NOTE — PROGRESS NOTE ADULT - SUBJECTIVE AND OBJECTIVE BOX
Critical Care PA - LOVE     76y Female PMHx Advanced Dementia, DM2, COPD, SAH, Chronic Respiratory Failure ( s/p Trach / Peg)., Prior Cardiac Arrest, HTN, dCHF, COVID-19, CKD, Anemia,  Multiple Hospitilizations for Acute on Chronic Respiratory Failure / PNA (most recent 8/2022 with MRDO's last Pseudomonas and Stenotrophomonas)   Admitted 10/18/2022 from SNF with Respiratory Distress and cyanotic appearance.       --- PMHx.---    Dementia of frontal lobe type  Aphasic stroke  Diabetes mellitus  Respiratory failure  Hypertension  GERD (gastroesophageal reflux disease)  Constipation  Rspiratory failure  CVA (cerebral vascular accident)  HTN (hypertension)  DM (diabetes mellitus)  Advanced dementia  COVID-19 virus detected  Quadriplegia  Pneumonia  Type II diabetes mellitus           Review Of Systems: Unable to access     Previous Medical Record reviewed.     --- Medications ---    acetaminophen     Tablet .. 650 milliGRAM(s) PRN  ALBUTerol    90 MICROgram(s) HFA Inhaler 2 Puff(s)  aluminum hydroxide/magnesium hydroxide/simethicone Suspension 30 milliLiter(s) PRN  cadexomer iodine 0.9% Gel 1 Application(s)  chlorhexidine 0.12% Liquid 15 milliLiter(s)  chlorhexidine 2% Cloths 1 Application(s)  dextrose 5%. 1000 milliLiter(s)  dextrose 5%. 1000 milliLiter(s)  dextrose 50% Injectable 25 Gram(s)  dextrose 50% Injectable 12.5 Gram(s)  dextrose 50% Injectable 25 Gram(s)  dextrose Oral Gel 15 Gram(s) PRN  glucagon  Injectable 1 milliGRAM(s)  heparin   Injectable 5000 Unit(s)  insulin glargine Injectable (LANTUS) 5 Unit(s)  insulin lispro (ADMELOG) corrective regimen sliding scale    ipratropium 17 MICROgram(s) HFA Inhaler 1 Puff(s)  LORazepam     Tablet 1 milliGRAM(s)  LORazepam   Injectable 1 milliGRAM(s) PRN  melatonin 3 milliGRAM(s) PRN  metoclopramide Injectable 10 milliGRAM(s) PRN  midodrine 10 milliGRAM(s)  ondansetron Injectable 4 milliGRAM(s) PRN  pantoprazole    Tablet 40 milliGRAM(s)  povidone iodine 10% Solution 1 Application(s)  simethicone 80 milliGRAM(s)      DVT Prophylaxis : Heparin Sq    Advanced Directives : Full Code     T(C): 37.2 (11-01-22 @ 15:48), Max: 38.7 (11-01-22 @ 00:01)  HR: 74 (11-01-22 @ 19:00)  BP: 130/69 (11-01-22 @ 19:00)  RR: 22 (11-01-22 @ 19:00)  SpO2: 100% (11-01-22 @ 19:00)      --- Physical Exam ---    General: No acute distress.  poorly responsive at baseline     HEENT: Decreased  to auscultation bilaterally, fine scattered rhonchi anteriorly B/L, No W/R, no significant sputum production    CVS: Regular rate and rhythm, no murmurs, rubs, or gallops    ABD: Soft, nondistended, nontender, normoactive bowel sounds, no masses + Peg     EXT: +1 LE  edema, nontender    SKIN: Warm and well perfused, no rashes noted.    --- Labratories ---                           9.1    11.33 )-----------( 169      ( 01 Nov 2022 07:12 )             30.1    11-01    134<L>  |  100  |  29<H>  ----------------------------<  145<H>  4.7   |  27  |  0.96    Ca    7.9<L>      01 Nov 2022 07:12  Mg     2.0     10-31    TPro  6.5  /  Alb  1.5<L>  /  TBili  0.2  /  DBili  x   /  AST  16  /  ALT  11  /  AlkPhos  130<H>  10-31          Radiology  / Ultrasound : < from: CT Abdomen and Pelvis No Cont (10.20.22 @ 22:43) >    ACC: [id number] EXAM:  CT ABDOMEN AND PELVIS                          PROCEDURE DATE:  10/20/2022          INTERPRETATION:  CLINICAL INFORMATION: 76 years  Female with ro rp bleed.    COMPARISON: 8/20/2022    CONTRAST/COMPLICATIONS:  IV Contrast: NONE  Oral Contrast: NONE  Complications: None reported at time of study completion    PROCEDURE:  CT of the Abdomen and Pelvis was performed.  Sagittal and coronal reformats were performed.    FINDINGS:  LOWER CHEST: Moderate bilateral pleural effusionswith underlying passive   atelectasis, unchanged. Right central line tip in the superior vena cava.    LIVER: Within normal limits.  BILE DUCTS: Normal caliber.  GALLBLADDER: Within normal limits.  SPLEEN: Within normal limits.  PANCREAS: Within normal limits.  ADRENALS: Within normal limits.  KIDNEYS/URETERS: Within normal limits.    BLADDER: Collapsed around Woody catheter.  REPRODUCTIVE ORGANS: Unremarkable uterus.    BOWEL: No bowel obstruction. Appendix is normal.. Gastrostomy tube   balloonin the stomach. Mildly distended rectum with liquid stool  PERITONEUM: Trace ascites.  VESSELS: Within normal limits.  RETROPERITONEUM/LYMPH NODES: No lymphadenopathy. No retroperitoneal   hemorrhage.  ABDOMINAL WALL: Within normal limits.  BONES: Moderate chronic L2 compression fracture deformity. Stable   deformity of the left proximal femur.    IMPRESSION:    No retroperitoneal hemorrhage.    Stable moderate bilateral pleural effusions with underlying compressive   atelectasis.    Small ascites.    Mildly distended rectum with liquid stool.      < end of copied text >      (Reviewing data, imaging, discussing with multidisciplinary team, non inclusive of procedures, discussing goals of care with patient/family)

## 2022-11-01 NOTE — PROGRESS NOTE ADULT - ASSESSMENT
Pt is a 76W w/ PMHx of DM2, COPD, HTN, dementia, hx of subarachnoid hemorrhage, and history of chronic trach brought in by ambulance for evaluation of low CO2 and cyanotic appearance.   Well-known and has had multiple admissions for Acute on chronic RF and PNA w/ MDROs    Acute VAP/PNA/Acute on chronic HF/CAUTI/UTI  - pt p/w cyanosis  - most recent admission in end of August, most recent cx w/ Pseudomonas and Stenotrophomonas  - CT chest Groundglass opacities and interlobular septal thickening suggestive of CHF. There are also some more confluent areas of density   which may represent atelectasis versus infiltrates.Bilateral pleural effusions have decreased from the prior exam  - UCx E.coli   - BCx NGTD  - sputum cx w/ Stenotrophomonas maltophilia  Plan:   S/p levofloxacin x7 day course, completed PNA tx  S/p ceftriaxone x10 day course, completed  Trend temps and cbc--leukocytosis resolved  Febrile again overnight 101.7  Remove vaughn  infectious w/u if febrile after 24hrs of vaughn removal  Pulmonary toilet  Isolation per infection control policy given hx of CRE  Supportive care/vent management per CCM    D/w Dr. Ruth    Infectious Diseases will continue to follow. Please call with any questions.   Andressa Brantley M.D.  [facility] Division of Infectious Diseases [phone]

## 2022-11-01 NOTE — PROGRESS NOTE ADULT - SUBJECTIVE AND OBJECTIVE BOX
Patient is a 76y old  Female who presents with a chief complaint of Acute on Hypoxemic respiratory failure (01 Nov 2022 10:37)      INTERVAL HPI/OVERNIGHT EVENTS:    spiked a fever.    johana glasgowed    MEDICATIONS  (STANDING):  ALBUTerol    90 MICROgram(s) HFA Inhaler 2 Puff(s) Inhalation every 6 hours  cadexomer iodine 0.9% Gel 1 Application(s) Topical <User Schedule>  chlorhexidine 0.12% Liquid 15 milliLiter(s) Oral Mucosa every 12 hours  chlorhexidine 2% Cloths 1 Application(s) Topical two times a day  dextrose 5%. 1000 milliLiter(s) (100 mL/Hr) IV Continuous <Continuous>  dextrose 5%. 1000 milliLiter(s) (50 mL/Hr) IV Continuous <Continuous>  dextrose 50% Injectable 25 Gram(s) IV Push once  dextrose 50% Injectable 12.5 Gram(s) IV Push once  dextrose 50% Injectable 25 Gram(s) IV Push once  glucagon  Injectable 1 milliGRAM(s) IntraMuscular once  heparin   Injectable 5000 Unit(s) SubCutaneous every 12 hours  insulin glargine Injectable (LANTUS) 5 Unit(s) SubCutaneous every morning  insulin lispro (ADMELOG) corrective regimen sliding scale   SubCutaneous every 6 hours  ipratropium 17 MICROgram(s) HFA Inhaler 1 Puff(s) Inhalation every 6 hours  LORazepam     Tablet 1 milliGRAM(s) Oral every 6 hours  midodrine 10 milliGRAM(s) Oral every 8 hours  pantoprazole    Tablet 40 milliGRAM(s) Oral before breakfast  povidone iodine 10% Solution 1 Application(s) Topical daily  simethicone 80 milliGRAM(s) Chew every 12 hours    MEDICATIONS  (PRN):  acetaminophen     Tablet .. 650 milliGRAM(s) Oral every 6 hours PRN Temp greater or equal to 38C (100.4F), Mild Pain (1 - 3)  aluminum hydroxide/magnesium hydroxide/simethicone Suspension 30 milliLiter(s) Oral every 4 hours PRN Dyspepsia  dextrose Oral Gel 15 Gram(s) Oral once PRN Blood Glucose LESS THAN 70 milliGRAM(s)/deciliter  LORazepam   Injectable 1 milliGRAM(s) IV Push every 4 hours PRN Agitation  melatonin 3 milliGRAM(s) Oral at bedtime PRN Insomnia  metoclopramide Injectable 10 milliGRAM(s) IV Push every 6 hours PRN nausea  ondansetron Injectable 4 milliGRAM(s) IV Push every 8 hours PRN Nausea and/or Vomiting      Allergies    codeine (Hives)    Intolerances        REVIEW OF SYSTEMS:  as above    Vital Signs Last 24 Hrs  T(C): 37.4 (01 Nov 2022 08:30), Max: 38.7 (01 Nov 2022 00:01)  T(F): 99.4 (01 Nov 2022 08:30), Max: 101.7 (01 Nov 2022 00:01)  HR: 70 (01 Nov 2022 10:26) (64 - 87)  BP: 128/67 (01 Nov 2022 09:00) (104/77 - 148/82)  BP(mean): 84 (01 Nov 2022 09:00) (73 - 98)  RR: 20 (01 Nov 2022 09:00) (17 - 39)  SpO2: 99% (01 Nov 2022 10:26) (94% - 100%)    Parameters below as of 01 Nov 2022 08:00  Patient On (Oxygen Delivery Method): ventilator    O2 Concentration (%): 50    PHYSICAL EXAM:  GENERAL: NAD, Non Verbal  HEAD:  Atraumatic, Normocephalic  ENMT: Trach to Vent   NECK: Supple, No JVD, Normal thyroid  CHEST/LUNG: Clear to auscultation bilaterally; No rales, rhonchi, wheezing, or rubs  HEART: Regular rate and rhythm; No murmurs, rubs, or gallops  ABDOMEN: Soft, Nontender, PEG Tube +   EXTREMITIES:  2+ Peripheral Pulses, No clubbing, cyanosis, or edema    LABS:                        9.1    11.33 )-----------( 169      ( 01 Nov 2022 07:12 )             30.1     01 Nov 2022 07:12    134    |  100    |  29     ----------------------------<  145    4.7     |  27     |  0.96     Ca    7.9        01 Nov 2022 07:12          CAPILLARY BLOOD GLUCOSE      POCT Blood Glucose.: 156 mg/dL (01 Nov 2022 08:12)  POCT Blood Glucose.: 179 mg/dL (31 Oct 2022 17:12)  POCT Blood Glucose.: 139 mg/dL (31 Oct 2022 11:53)      RADIOLOGY & ADDITIONAL TESTS:

## 2022-11-01 NOTE — PROGRESS NOTE ADULT - SUBJECTIVE AND OBJECTIVE BOX
INTERVAL HPI/OVERNIGHT EVENTS:  No new overnight event.  No N/V/D.  Tolerating diet via peg  Allergies    codeine (Hives)    Intolerances    General:  No wt loss, fevers, chills, night sweats, fatigue,   Eyes:  Good vision, no reported pain  ENT:  No sore throat, pain, runny nose, dysphagia  CV:  No pain, palpitations, hypo/hypertension  Resp:  No dyspnea, cough, tachypnea, wheezing  GI:  No pain, No nausea, No vomiting, No diarrhea, No constipation, No weight loss, No fever, No pruritis, No rectal bleeding, No tarry stools, No dysphagia,  :  No pain, bleeding, incontinence, nocturia  Muscle:  No pain, weakness  Neuro:  No weakness, tingling, memory problems  Psych:  No fatigue, insomnia, mood problems, depression  Endocrine:  No polyuria, polydipsia, cold/heat intolerance  Heme:  No petechiae, ecchymosis, easy bruisability  Skin:  No rash, tattoos, scars, edema      PHYSICAL EXAM:   Vital Signs:  Vital Signs Last 24 Hrs  T(C): 37.5 (2022 06:00), Max: 38.7 (2022 00:01)  T(F): 99.5 (2022 06:00), Max: 101.7 (2022 00:01)  HR: 65 (2022 06:00) (61 - 87)  BP: 109/59 (2022 06:00) (104/77 - 148/82)  BP(mean): 74 (2022 06:00) (72 - 98)  RR: 18 (2022 06:00) (17 - 39)  SpO2: 96% (2022 06:00) (94% - 100%)    Parameters below as of 2022 06:00  Patient On (Oxygen Delivery Method): ventilator    O2 Concentration (%): 50  Daily     Daily Weight in k.1 (2022 04:00)I&O's Summary    31 Oct 2022 07:01  -  2022 07:00  --------------------------------------------------------  IN: 1540 mL / OUT: 900 mL / NET: 640 mL        GENERAL:  Appears stated age, well-groomed, well-nourished, no distress  HEENT:  NC/AT,  conjunctivae clear and pink, no thyromegaly, nodules, adenopathy, no JVD, sclera -anicteric  CHEST:  Full & symmetric excursion, no increased effort, breath sounds clear  HEART:  Regular rhythm, S1, S2, no murmur/rub/S3/S4, no abdominal bruit, no edema  ABDOMEN:  Soft, non-tender, non-distended, normoactive bowel sounds,  no masses ,no hepato-splenomegaly, no signs of chronic liver disease  EXTEREMITIES:  no cyanosis,clubbing or edema  SKIN:  No rash/erythema/ecchymoses/petechiae/wounds/abscess/warm/dry  NEURO:  Alert, oriented, no asterixis, no tremor, no encephalopathy      LABS:                        9.1    11.33 )-----------( 169      ( 2022 07:12 )             30.1     11-    134<L>  |  100  |  29<H>  ----------------------------<  145<H>  4.7   |  27  |  0.96    Ca    7.9<L>      2022 07:12  Mg     2.0     10-31    TPro  6.5  /  Alb  1.5<L>  /  TBili  0.2  /  DBili  x   /  AST  16  /  ALT  11  /  AlkPhos  130<H>  10-31        amylase   lipase  RADIOLOGY & ADDITIONAL TESTS:

## 2022-11-01 NOTE — PROGRESS NOTE ADULT - SUBJECTIVE AND OBJECTIVE BOX
Chief Complaint: Hypoxia    Interval Events: No events overnight.    Review of Systems:  Unable to obtain    Physical Exam:  Vital Signs Last 24 Hrs  T(C): 37.6 (01 Nov 2022 08:00), Max: 38.7 (01 Nov 2022 00:01)  T(F): 99.7 (01 Nov 2022 08:00), Max: 101.7 (01 Nov 2022 00:01)  HR: 72 (01 Nov 2022 08:00) (61 - 87)  BP: 122/61 (01 Nov 2022 08:00) (104/77 - 148/82)  BP(mean): 80 (01 Nov 2022 08:00) (72 - 98)  RR: 19 (01 Nov 2022 08:00) (17 - 39)  SpO2: 95% (01 Nov 2022 08:00) (94% - 100%)  Parameters below as of 01 Nov 2022 08:00  Patient On (Oxygen Delivery Method): ventilator  O2 Concentration (%): 50  General: Unresponsive  HEENT: Trach  Neck: No JVD, no carotid bruit  Lungs: CTAB  CV: RRR, nl S1/S2, no M/R/G  Abdomen: S/NT/ND, +BS  Extremities: No LE edema, no cyanosis  Neuro: AAOx0  Skin: No rash    Labs:    11-01    134<L>  |  100  |  29<H>  ----------------------------<  145<H>  4.7   |  27  |  0.96    Ca    7.9<L>      01 Nov 2022 07:12  Mg     2.0     10-31    TPro  6.5  /  Alb  1.5<L>  /  TBili  0.2  /  DBili  x   /  AST  16  /  ALT  11  /  AlkPhos  130<H>  10-31                        9.1    11.33 )-----------( 169      ( 01 Nov 2022 07:12 )             30.1       Telemetry: Sinus rhythm

## 2022-11-01 NOTE — PROGRESS NOTE ADULT - ASSESSMENT
75yo F with PMH of dementia, COPD with chronic resp failure and is vent dependent, s/p PEG, hx of cardiac arrest, diastolic CHF, cor pulmonale, hx of CVA, COVID-19 infxn, CKD, anemia, multiple admissions for respiratory failure (recent admissions in 06/2022 and 08/2022 diagnosed with PNA with parapneumonic pleural effusion s/p thoracentesis and Avycaz) who presents from [facility] for respiratory distress again a/w sepsis and acute on chronic hypoxic respiratory failure due to suspected recurrent VAP.    Severe sepsis and acute hypoxic respiratory failure 2/2 gram-negative PNA   - titrating down FiO2 on vent - have had success maintaining saturation on 40% FiO2 now -- continue to taper down as able  - completed 7-day course of levaquin per ID for PNA (Stenotrophomonas on sputum culture) ; and 10-day course of rocephin for potential CAUTI  - ID ([facility]), recs appreciated   - procalcitonin high but improving 23 ->6.6 within a couple of days of starting Abx and now down to 0.47 on 10/27/22  - cautious use of fluids given hx of CHF and severe hypoalbuminemia (received 1750cc of NS in ED)  - blood cultures (NGTD), urine culture growing E coli sensitive to rocephin  - trach sputum culture - grew Stenotrophomonas sensitive to levaquin  - Checked CT Abd/P to eval for any other potential source sign of infection and found no significant sign of intra-abdominal infection on CT  - c/w midodrine with hold parameters -- consider titrating dose if BP elevated  - cc/pulm consult (Jaime), recs appreciated  - palliative care (Emre), recs appreciated - pt is full code  - leukocytosis resolved  spiked a fever  [facility]'ed.     Diastolic CHF  Hx of Pleural effusions  - last TTE was 5/30 with EF 65% with normal LVSF, dilated RV, and moderate pHTN -> repeat TTE appears unchanged  - pt also with severe hypoalbuminemia   - may need repeat thoracentesis (had 1.5L drained few admissions ago), pulmonology consulted; pl effusion was parapneumonic on past admission as opposed to transudative and thus less likely related to CHF    RYAN on CKD 3  - likely in part ATN due to sepsis and in part prerenal   - renal function recovered to baseline CKD 3  - renally dose medications and monitor BMP and electrolytes   - given pt has low muscle mass from being bedbound for years, this GFR estimate is likely falsely high  - monitor BMP  - hypokalemia repleted with KCl    Anemia of chronic disease  - has been evaluated by GI and hematology in the past -> dx with anemia of chronic disease with intermittent GI bleeding  - s/p 2 unit PRBC and will transfuse 1U today 10/31/22  - current Hgb generally stable ~ 7.5-8; monitor for drop and possible need for another PRBC - will order T&S for AM in case Hgb downtrends  - no jacqueline GI bleed per nursing     hx of HTN  - not on any anti-hypertensive meds at facility  - monitor VS    DM2  - NPO with TF  - c/w moderate dose lispro coverage sliding scale q6h  - goal -180 - now at goal - c/w lantus 5un sq QAM     Diet  - TF was on hold 2/2 leak around PEG site, GI readjusted PEG and not leaking currently -- c/w tube feeds per dietician recs  - GI (Leia/ group), recs appreciated     Preventive measures  - cont with heparin subq for DVT ppx  - Full Code     Disp  -Will transfuse 1U PRBC today and if appropriate response and HgB stable can plan for discharge in 24 hour

## 2022-11-01 NOTE — PROGRESS NOTE ADULT - ASSESSMENT
76y Female PMHx Advanced Dementia, DM2, COPD, SAH, Chronic Respiratory Failure ( s/p Trach / Peg)., Prior Cardiac Arrest, HTN, dCHF, COVID-19, CKD, Anemia,  Multiple Hospitilizations for Acute on Chronic Respiratory Failure / PNA (most recent 8/2022 with MRDO's last Pseudomonas and Stenotrophomonas)   Admitted 10/18/2022 from SNF with Respiratory Distress and cyanotic appearance.     Acute on Chronic Respiratory Failure   VAP / PNA   UTI       Anxiolytics PRN   Unlabored on Her Normal Vent settings   COnt Nebs   HDS on Midodrine   Tolerating TF / PPI  Watch lytes and UOP  S/P Levoquin X 7 days   S/P Rocephin X 10  Cultures NGTD  Watch Off ABX - Last Fever last night   Woody removed today   DC Planning

## 2022-11-02 ENCOUNTER — TRANSCRIPTION ENCOUNTER (OUTPATIENT)
Age: 77
End: 2022-11-02

## 2022-11-02 VITALS
HEART RATE: 68 BPM | SYSTOLIC BLOOD PRESSURE: 122 MMHG | RESPIRATION RATE: 18 BRPM | DIASTOLIC BLOOD PRESSURE: 77 MMHG | OXYGEN SATURATION: 99 %

## 2022-11-02 LAB
ALBUMIN SERPL ELPH-MCNC: 1.6 G/DL — LOW (ref 3.3–5)
ALP SERPL-CCNC: 139 U/L — HIGH (ref 30–120)
ALT FLD-CCNC: 11 U/L DA — SIGNIFICANT CHANGE UP (ref 10–60)
ANION GAP SERPL CALC-SCNC: 7 MMOL/L — SIGNIFICANT CHANGE UP (ref 5–17)
AST SERPL-CCNC: 14 U/L — SIGNIFICANT CHANGE UP (ref 10–40)
BILIRUB SERPL-MCNC: 0.5 MG/DL — SIGNIFICANT CHANGE UP (ref 0.2–1.2)
BUN SERPL-MCNC: 25 MG/DL — HIGH (ref 7–23)
CALCIUM SERPL-MCNC: 7.7 MG/DL — LOW (ref 8.4–10.5)
CHLORIDE SERPL-SCNC: 103 MMOL/L — SIGNIFICANT CHANGE UP (ref 96–108)
CO2 SERPL-SCNC: 27 MMOL/L — SIGNIFICANT CHANGE UP (ref 22–31)
CREAT SERPL-MCNC: 0.8 MG/DL — SIGNIFICANT CHANGE UP (ref 0.5–1.3)
EGFR: 75 ML/MIN/1.73M2 — SIGNIFICANT CHANGE UP
GLUCOSE BLDC GLUCOMTR-MCNC: 114 MG/DL — HIGH (ref 70–99)
GLUCOSE BLDC GLUCOMTR-MCNC: 130 MG/DL — HIGH (ref 70–99)
GLUCOSE BLDC GLUCOMTR-MCNC: 165 MG/DL — HIGH (ref 70–99)
GLUCOSE SERPL-MCNC: 151 MG/DL — HIGH (ref 70–99)
HCT VFR BLD CALC: 30.7 % — LOW (ref 34.5–45)
HGB BLD-MCNC: 9.2 G/DL — LOW (ref 11.5–15.5)
MCHC RBC-ENTMCNC: 27.6 PG — SIGNIFICANT CHANGE UP (ref 27–34)
MCHC RBC-ENTMCNC: 30 GM/DL — LOW (ref 32–36)
MCV RBC AUTO: 92.2 FL — SIGNIFICANT CHANGE UP (ref 80–100)
NRBC # BLD: 0 /100 WBCS — SIGNIFICANT CHANGE UP (ref 0–0)
PLATELET # BLD AUTO: 167 K/UL — SIGNIFICANT CHANGE UP (ref 150–400)
POTASSIUM SERPL-MCNC: 4.7 MMOL/L — SIGNIFICANT CHANGE UP (ref 3.5–5.3)
POTASSIUM SERPL-SCNC: 4.7 MMOL/L — SIGNIFICANT CHANGE UP (ref 3.5–5.3)
PROT SERPL-MCNC: 6.2 G/DL — SIGNIFICANT CHANGE UP (ref 6–8.3)
RBC # BLD: 3.33 M/UL — LOW (ref 3.8–5.2)
RBC # FLD: 18 % — HIGH (ref 10.3–14.5)
SODIUM SERPL-SCNC: 137 MMOL/L — SIGNIFICANT CHANGE UP (ref 135–145)
WBC # BLD: 8.3 K/UL — SIGNIFICANT CHANGE UP (ref 3.8–10.5)
WBC # FLD AUTO: 8.3 K/UL — SIGNIFICANT CHANGE UP (ref 3.8–10.5)

## 2022-11-02 PROCEDURE — 82962 GLUCOSE BLOOD TEST: CPT

## 2022-11-02 PROCEDURE — 87070 CULTURE OTHR SPECIMN AEROBIC: CPT

## 2022-11-02 PROCEDURE — 94003 VENT MGMT INPAT SUBQ DAY: CPT

## 2022-11-02 PROCEDURE — 80053 COMPREHEN METABOLIC PANEL: CPT

## 2022-11-02 PROCEDURE — 84145 PROCALCITONIN (PCT): CPT

## 2022-11-02 PROCEDURE — 86901 BLOOD TYPING SEROLOGIC RH(D): CPT

## 2022-11-02 PROCEDURE — 86900 BLOOD TYPING SEROLOGIC ABO: CPT

## 2022-11-02 PROCEDURE — 85027 COMPLETE CBC AUTOMATED: CPT

## 2022-11-02 PROCEDURE — 85610 PROTHROMBIN TIME: CPT

## 2022-11-02 PROCEDURE — 36430 TRANSFUSION BLD/BLD COMPNT: CPT

## 2022-11-02 PROCEDURE — 87635 SARS-COV-2 COVID-19 AMP PRB: CPT

## 2022-11-02 PROCEDURE — 71250 CT THORAX DX C-: CPT | Mod: MA

## 2022-11-02 PROCEDURE — 87186 SC STD MICRODIL/AGAR DIL: CPT

## 2022-11-02 PROCEDURE — 94002 VENT MGMT INPAT INIT DAY: CPT

## 2022-11-02 PROCEDURE — 94640 AIRWAY INHALATION TREATMENT: CPT

## 2022-11-02 PROCEDURE — 85730 THROMBOPLASTIN TIME PARTIAL: CPT

## 2022-11-02 PROCEDURE — 87184 SC STD DISK METHOD PER PLATE: CPT

## 2022-11-02 PROCEDURE — 99285 EMERGENCY DEPT VISIT HI MDM: CPT

## 2022-11-02 PROCEDURE — 82803 BLOOD GASES ANY COMBINATION: CPT

## 2022-11-02 PROCEDURE — 81001 URINALYSIS AUTO W/SCOPE: CPT

## 2022-11-02 PROCEDURE — 82272 OCCULT BLD FECES 1-3 TESTS: CPT

## 2022-11-02 PROCEDURE — 71045 X-RAY EXAM CHEST 1 VIEW: CPT

## 2022-11-02 PROCEDURE — 99233 SBSQ HOSP IP/OBS HIGH 50: CPT

## 2022-11-02 PROCEDURE — 83735 ASSAY OF MAGNESIUM: CPT

## 2022-11-02 PROCEDURE — 93005 ELECTROCARDIOGRAM TRACING: CPT

## 2022-11-02 PROCEDURE — 85025 COMPLETE CBC W/AUTO DIFF WBC: CPT

## 2022-11-02 PROCEDURE — 83036 HEMOGLOBIN GLYCOSYLATED A1C: CPT

## 2022-11-02 PROCEDURE — 74176 CT ABD & PELVIS W/O CONTRAST: CPT

## 2022-11-02 PROCEDURE — 83605 ASSAY OF LACTIC ACID: CPT

## 2022-11-02 PROCEDURE — 87086 URINE CULTURE/COLONY COUNT: CPT

## 2022-11-02 PROCEDURE — 87077 CULTURE AEROBIC IDENTIFY: CPT

## 2022-11-02 PROCEDURE — 84100 ASSAY OF PHOSPHORUS: CPT

## 2022-11-02 PROCEDURE — 94799 UNLISTED PULMONARY SVC/PX: CPT

## 2022-11-02 PROCEDURE — 96365 THER/PROPH/DIAG IV INF INIT: CPT

## 2022-11-02 PROCEDURE — 85018 HEMOGLOBIN: CPT

## 2022-11-02 PROCEDURE — 36415 COLL VENOUS BLD VENIPUNCTURE: CPT

## 2022-11-02 PROCEDURE — 94760 N-INVAS EAR/PLS OXIMETRY 1: CPT

## 2022-11-02 PROCEDURE — 80048 BASIC METABOLIC PNL TOTAL CA: CPT

## 2022-11-02 PROCEDURE — 87040 BLOOD CULTURE FOR BACTERIA: CPT

## 2022-11-02 PROCEDURE — 86923 COMPATIBILITY TEST ELECTRIC: CPT

## 2022-11-02 PROCEDURE — 0225U NFCT DS DNA&RNA 21 SARSCOV2: CPT

## 2022-11-02 PROCEDURE — P9016: CPT

## 2022-11-02 PROCEDURE — 86850 RBC ANTIBODY SCREEN: CPT

## 2022-11-02 RX ORDER — MIDODRINE HYDROCHLORIDE 2.5 MG/1
1 TABLET ORAL
Qty: 0 | Refills: 0 | DISCHARGE
Start: 2022-11-02

## 2022-11-02 RX ORDER — SIMETHICONE 80 MG/1
0 TABLET, CHEWABLE ORAL
Qty: 0 | Refills: 0 | DISCHARGE

## 2022-11-02 RX ADMIN — Medication 1 PUFF(S): at 02:03

## 2022-11-02 RX ADMIN — Medication 2: at 06:09

## 2022-11-02 RX ADMIN — Medication 1 APPLICATION(S): at 11:45

## 2022-11-02 RX ADMIN — ALBUTEROL 2 PUFF(S): 90 AEROSOL, METERED ORAL at 08:32

## 2022-11-02 RX ADMIN — ALBUTEROL 2 PUFF(S): 90 AEROSOL, METERED ORAL at 02:03

## 2022-11-02 RX ADMIN — Medication 1 MILLIGRAM(S): at 11:45

## 2022-11-02 RX ADMIN — PANTOPRAZOLE SODIUM 40 MILLIGRAM(S): 20 TABLET, DELAYED RELEASE ORAL at 06:09

## 2022-11-02 RX ADMIN — MIDODRINE HYDROCHLORIDE 10 MILLIGRAM(S): 2.5 TABLET ORAL at 05:58

## 2022-11-02 RX ADMIN — CHLORHEXIDINE GLUCONATE 15 MILLILITER(S): 213 SOLUTION TOPICAL at 05:58

## 2022-11-02 RX ADMIN — HEPARIN SODIUM 5000 UNIT(S): 5000 INJECTION INTRAVENOUS; SUBCUTANEOUS at 05:59

## 2022-11-02 RX ADMIN — CHLORHEXIDINE GLUCONATE 1 APPLICATION(S): 213 SOLUTION TOPICAL at 05:58

## 2022-11-02 RX ADMIN — SIMETHICONE 80 MILLIGRAM(S): 80 TABLET, CHEWABLE ORAL at 05:58

## 2022-11-02 RX ADMIN — Medication 1 PUFF(S): at 08:32

## 2022-11-02 RX ADMIN — INSULIN GLARGINE 5 UNIT(S): 100 INJECTION, SOLUTION SUBCUTANEOUS at 07:54

## 2022-11-02 RX ADMIN — Medication 1 MILLIGRAM(S): at 05:58

## 2022-11-02 NOTE — PROGRESS NOTE ADULT - SUBJECTIVE AND OBJECTIVE BOX
[facility], Division of Infectious Diseases  MOIZ Lora Y. Patel, S. Shah, G. [facility]  [phone]    Name: BREA BECKHAM  Age: 76y  Gender: Female  MRN: [id number]    Interval History:  Patient seen and examined at bedside   No acute overnight events. Afebrile  Notes reviewed    Antibiotics:      Medications:  acetaminophen     Tablet .. 650 milliGRAM(s) Oral every 6 hours PRN  ALBUTerol    90 MICROgram(s) HFA Inhaler 2 Puff(s) Inhalation every 6 hours  aluminum hydroxide/magnesium hydroxide/simethicone Suspension 30 milliLiter(s) Oral every 4 hours PRN  cadexomer iodine 0.9% Gel 1 Application(s) Topical <User Schedule>  chlorhexidine 0.12% Liquid 15 milliLiter(s) Oral Mucosa every 12 hours  chlorhexidine 2% Cloths 1 Application(s) Topical two times a day  dextrose 5%. 1000 milliLiter(s) IV Continuous <Continuous>  dextrose 5%. 1000 milliLiter(s) IV Continuous <Continuous>  dextrose 50% Injectable 25 Gram(s) IV Push once  dextrose 50% Injectable 12.5 Gram(s) IV Push once  dextrose 50% Injectable 25 Gram(s) IV Push once  dextrose Oral Gel 15 Gram(s) Oral once PRN  glucagon  Injectable 1 milliGRAM(s) IntraMuscular once  heparin   Injectable 5000 Unit(s) SubCutaneous every 12 hours  insulin glargine Injectable (LANTUS) 5 Unit(s) SubCutaneous every morning  insulin lispro (ADMELOG) corrective regimen sliding scale   SubCutaneous every 6 hours  ipratropium 17 MICROgram(s) HFA Inhaler 1 Puff(s) Inhalation every 6 hours  LORazepam     Tablet 1 milliGRAM(s) Oral every 6 hours  LORazepam   Injectable 1 milliGRAM(s) IV Push every 4 hours PRN  melatonin 3 milliGRAM(s) Oral at bedtime PRN  metoclopramide Injectable 10 milliGRAM(s) IV Push every 6 hours PRN  midodrine 10 milliGRAM(s) Oral every 8 hours  ondansetron Injectable 4 milliGRAM(s) IV Push every 8 hours PRN  pantoprazole    Tablet 40 milliGRAM(s) Oral before breakfast  povidone iodine 10% Solution 1 Application(s) Topical daily  simethicone 80 milliGRAM(s) Chew every 12 hours      Review of Systems:   Allergies: codeine (Hives)    For details regarding the patient's past medical history, social history, family history, and other miscellaneous elements, please refer the initial infectious diseases consultation and/or the admitting history and physical examination for this admission.    Objective:  Vital Signs Last 24 Hrs  T(C): 36.6 (02 Nov 2022 10:00), Max: 37.6 (01 Nov 2022 12:26)  T(F): 97.9 (02 Nov 2022 10:00), Max: 99.6 (01 Nov 2022 12:26)  HR: 63 (02 Nov 2022 11:00) (60 - 109)  BP: 125/50 (02 Nov 2022 11:00) (104/60 - 166/80)  BP(mean): 73 (02 Nov 2022 11:00) (73 - 105)  RR: 15 (02 Nov 2022 11:00) (15 - 30)  SpO2: 99% (02 Nov 2022 11:00) (93% - 100%)    Parameters below as of 02 Nov 2022 11:00  Patient On (Oxygen Delivery Method): ventilator    O2 Concentration (%): 50    Physical Examination:  General: NAD  HEENT: NC/AT  Neck: trach to vent  Lungs: MV breath sounds  Heart: Regular rate and rhythm. No murmur, rub or gallop.  Abdomen: Soft. Nondistended. Nontender.  Skin: Warm. Dry.      Laboratory Studies:                        9.2    8.30  )-----------( 167      ( 02 Nov 2022 06:18 )             30.7   11-02    137  |  103  |  25<H>  ----------------------------<  151<H>  4.7   |  27  |  0.80    Ca    7.7<L>      02 Nov 2022 06:18    TPro  6.2  /  Alb  1.6<L>  /  TBili  0.5  /  DBili  x   /  AST  14  /  ALT  11  /  AlkPhos  139<H>  11-02        Microbiology: reviewed    Culture - Sputum (collected 10-19-22 @ 00:58)  Source: .Sputum Sputum trap  Gram Stain (10-19-22 @ 19:24):    Few Squamous epithelial cells per low power field    Few polymorphonuclear leukocytes per low power field    Few Gram positive cocci in pairs per oil power field  Final Report (10-22-22 @ 17:55):    Moderate Mixed gram negative rods including    Moderate Stenotrophomonas maltophilia  Organism: Stenotrophomonas maltophilia (10-22-22 @ 17:56)      -  Minocycline: S [id number]      Method Type: KB  Organism: Stenotrophomonas maltophilia (10-22-22 @ 17:56)      -  Levofloxacin: S 2      -  Trimethoprim/Sulfamethoxazole: S <=0.5/9.5      Method Type: PRATIK  Organism: Stenotrophomonas maltophilia (10-22-22 @ 17:55)          Radiology: reviewed

## 2022-11-02 NOTE — DISCHARGE NOTE PROVIDER - INSTRUCTIONS
Diet, NPO with Tube Feed:   Tube Feeding Modality: Gastrostomy  Glucerna 1.5 Horacio  Total Volume for 24 Hours (mL): 960  Continuous  Starting Tube Feed Rate {mL per Hour}: 40  Until Goal Tube Feed Rate (mL per Hour): 40  Tube Feed Duration (in Hours): 24  Tube Feed Start Time: 14:00  Free Water Flush  Pump   Rate (mL per Hour): 30   Frequency: Every Hour    Duration (Hours): 24    Start Time: 14:00  Lucas(7 Gm Arginine/7 Gm Glut/1.2 Gm HMB     Qty per Day:  1 (10-29-22 @ 12:32) [Active]

## 2022-11-02 NOTE — DISCHARGE NOTE PROVIDER - NSDCCPCAREPLAN_GEN_ALL_CORE_FT
PRINCIPAL DISCHARGE DIAGNOSIS  Diagnosis: Pneumonia  Assessment and Plan of Treatment: seen by ID, completed antibiotic course      SECONDARY DISCHARGE DIAGNOSES  Diagnosis: RYAN (acute kidney injury)  Assessment and Plan of Treatment: improved to WNL after hydration

## 2022-11-02 NOTE — PROGRESS NOTE ADULT - ASSESSMENT
76F with dementia, COPD with chronic resp failure and is vent dependent, s/p PEG, hx of cardiac arrest, CHF, cor pulmonale, CVA, COVID-19 infxn, CKD, anemia, multiple admissions    dementia  COPD  chr resp failure  vegetative state  dysphagia  peg  hx of card arrest - anoxic brain  CHF  cva    Fio2 titration in progress for preparation to DC back to SNF   s/p ABX -  CCM notes reviewed  vs noted  labs reviewed  Wound care in progress -   GI consult noted    Diagnosis; Prognosis; MOLST Discussed  Marciano -   HCP  pt is full code  spoke with  -

## 2022-11-02 NOTE — DISCHARGE NOTE PROVIDER - CARE PROVIDER_API CALL
Paul Merrill  INTERNAL MEDICINE  [address] [place] [address], [address]  [place] [address]  Phone: [phone]  Fax: [phone]  Follow Up Time:

## 2022-11-02 NOTE — DISCHARGE NOTE PROVIDER - NSDCCAREPROVSEEN_GEN_ALL_CORE_FT
Rocephin 2 g IV  Begin Cipro on Saturday morning 1 twice daily for 10 days  Zofran as needed for nausea  Good fluid intake  Expect stepwise improvement begin in next 2 days  Should you see the pain worsen or become intolerant of the oral medications and return for imaging as discussed     Leia, Farshad Olivas, Arturo Brantley, Andressa Andrea, Heriberto Sandoval, Van Sotelo, Branden JEO

## 2022-11-02 NOTE — PROGRESS NOTE ADULT - SUBJECTIVE AND OBJECTIVE BOX
BREA BECKHAM     [facility]    Allergies    codeine (Hives)    Intolerances        PAST MEDICAL & SURGICAL HISTORY:  Dementia of frontal lobe type      Aphasic stroke      Diabetes mellitus      Respiratory failure      Hypertension      GERD (gastroesophageal reflux disease)      Constipation      Respiratory failure      CVA (cerebral vascular accident)      HTN (hypertension)      DM (diabetes mellitus)      Advanced dementia      COVID-19 virus detected      Quadriplegia      Pneumonia      Type II diabetes mellitus      Hx of appendectomy      Gastrostomy in place      Tracheostomy in place      Tracheostomy tube present      Feeding by G-tube          FAMILY HISTORY:      Home Medications:  albuterol 90 mcg/inh inhalation aerosol with adapter: 2  inhaled every 6 hours (18 Oct 2022 11:42)  Bacid (LAC) oral tablet: 2 tab(s) by gastrostomy tube once a day (18 Oct 2022 11:42)  Betadine 10% topical swab: cleanse right and left great toes (18 Oct 2022 11:42)  chlorhexidine 0.12% mucous membrane liquid: 15 milliliter(s) mucous membrane 2 times a day (18 Oct 2022 11:42)  Eucerin topical cream: Apply topically to affected area once a day bilateral feet (18 Oct 2022 11:42)  insulin glargine 100 units/mL subcutaneous solution: 8 unit(s) subcutaneous once a day (in the morning) (18 Oct 2022 11:42)  ipratropium-albuterol 0.5 mg-2.5 mg/3 mL inhalation solution: 3 milliliter(s) inhaled 4 times a day (18 Oct 2022 11:42)  LORazepam 1 mg oral tablet: 1 tab(s) by gastrostomy tube every 4 hours (18 Oct 2022 11:42)  methocarbamol 500 mg oral tablet: 1 tab(s) by gastrostomy tube 2 times a day (18 Oct 2022 11:42)  MiraLax oral powder for reconstitution: g-tube (18 Oct 2022 13:04)  Multiple Vitamins oral tablet: 1 tab(s) orally once a day (18 Oct 2022 11:42)  nystatin 100,000 units/g topical powder: 1 application topically 3 times a day (18 Oct 2022 11:42)  omeprazole 20 mg oral delayed release capsule: orally 2 times a day (18 Oct 2022 11:42)  polyethylene glycol 3350 oral powder for reconstitution: 17 gram(s) by gastrostomy tube every 12 hours (18 Oct 2022 11:42)  senna 8.6 mg oral tablet: 3 tab(s) by gastrostomy tube once a day (at bedtime) (18 Oct 2022 11:42)  simethicone 80 mg oral tablet: g-tube (18 Oct 2022 13:04)  simethicone 80 mg oral tablet, chewable: 1 tab(s) by gastrostomy tube every 6 hours (18 Oct 2022 11:42)  sucralfate 1 g/10 mL oral suspension: 10 milliliter(s) g-tube 4 times a day (before meals and at bedtime) (18 Oct 2022 13:04)  Tylenol 325 mg oral tablet: 2 tab(s) by gastrostomy tube once a day; 60 minutes prior to dressing change  (18 Oct 2022 11:42)  Tylenol 325 mg oral tablet: 2 tab(s) by gastrostomy tube every 6 hours, As Needed (18 Oct 2022 11:42)      MEDICATIONS  (STANDING):  ALBUTerol    90 MICROgram(s) HFA Inhaler 2 Puff(s) Inhalation every 6 hours  cadexomer iodine 0.9% Gel 1 Application(s) Topical <User Schedule>  chlorhexidine 0.12% Liquid 15 milliLiter(s) Oral Mucosa every 12 hours  chlorhexidine 2% Cloths 1 Application(s) Topical two times a day  dextrose 5%. 1000 milliLiter(s) (100 mL/Hr) IV Continuous <Continuous>  dextrose 5%. 1000 milliLiter(s) (50 mL/Hr) IV Continuous <Continuous>  dextrose 50% Injectable 25 Gram(s) IV Push once  dextrose 50% Injectable 12.5 Gram(s) IV Push once  dextrose 50% Injectable 25 Gram(s) IV Push once  glucagon  Injectable 1 milliGRAM(s) IntraMuscular once  heparin   Injectable 5000 Unit(s) SubCutaneous every 12 hours  insulin glargine Injectable (LANTUS) 5 Unit(s) SubCutaneous every morning  insulin lispro (ADMELOG) corrective regimen sliding scale   SubCutaneous every 6 hours  ipratropium 17 MICROgram(s) HFA Inhaler 1 Puff(s) Inhalation every 6 hours  LORazepam     Tablet 1 milliGRAM(s) Oral every 6 hours  midodrine 10 milliGRAM(s) Oral every 8 hours  pantoprazole    Tablet 40 milliGRAM(s) Oral before breakfast  povidone iodine 10% Solution 1 Application(s) Topical daily  simethicone 80 milliGRAM(s) Chew every 12 hours    MEDICATIONS  (PRN):  acetaminophen     Tablet .. 650 milliGRAM(s) Oral every 6 hours PRN Temp greater or equal to 38C (100.4F), Mild Pain (1 - 3)  aluminum hydroxide/magnesium hydroxide/simethicone Suspension 30 milliLiter(s) Oral every 4 hours PRN Dyspepsia  dextrose Oral Gel 15 Gram(s) Oral once PRN Blood Glucose LESS THAN 70 milliGRAM(s)/deciliter  LORazepam   Injectable 1 milliGRAM(s) IV Push every 4 hours PRN Agitation  melatonin 3 milliGRAM(s) Oral at bedtime PRN Insomnia  metoclopramide Injectable 10 milliGRAM(s) IV Push every 6 hours PRN nausea  ondansetron Injectable 4 milliGRAM(s) IV Push every 8 hours PRN Nausea and/or Vomiting      Diet, NPO with Tube Feed:   Tube Feeding Modality: Gastrostomy  Glucerna 1.5 Horacio  Total Volume for 24 Hours (mL): 960  Continuous  Starting Tube Feed Rate mL per Hour: 40  Until Goal Tube Feed Rate (mL per Hour): 40  Tube Feed Duration (in Hours): 24  Tube Feed Start Time: 14:00  Free Water Flush  Pump   Rate (mL per Hour): 30   Frequency: Every Hour    Duration (Hours): 24    Start Time: 14:00  Lucas(7 Gm Arginine/7 Gm Glut/1.2 Gm HMB     Qty per Day:  1 (10-29-22 @ 12:32) [Active]          Vital Signs Last 24 Hrs  T(C): 36.6 (02 Nov 2022 03:56), Max: 37.6 (01 Nov 2022 12:26)  T(F): 97.8 (02 Nov 2022 03:56), Max: 99.6 (01 Nov 2022 12:26)  HR: 72 (02 Nov 2022 07:01) (60 - 109)  BP: 137/70 (02 Nov 2022 07:01) (104/60 - 166/80)  BP(mean): 90 (02 Nov 2022 07:01) (74 - 105)  RR: 20 (02 Nov 2022 07:01) (17 - 30)  SpO2: 100% (02 Nov 2022 07:01) (93% - 100%)    Parameters below as of 02 Nov 2022 07:01  Patient On (Oxygen Delivery Method): ventilator    O2 Concentration (%): 50      11-01-22 @ 07:01  -  11-02-22 @ 07:00  --------------------------------------------------------  IN: 1510 mL / OUT: 1550 mL / NET: -40 mL    11-02-22 @ 07:01  -  11-02-22 @ 08:06  --------------------------------------------------------  IN: 70 mL / OUT: 0 mL / NET: 70 mL        Mode: AC/ CMV (Assist Control/ Continuous Mandatory Ventilation), RR (machine): 18, TV (machine): 400, FiO2: 50, PEEP: 5, ITime: 1, MAP: 16, PIP: 35      LABS:                        9.2    8.30  )-----------( 167      ( 02 Nov 2022 06:18 )             30.7     11-02    137  |  103  |  25<H>  ----------------------------<  151<H>  4.7   |  27  |  0.80    Ca    7.7<L>      02 Nov 2022 06:18    TPro  6.2  /  Alb  1.6<L>  /  TBili  0.5  /  DBili  x   /  AST  14  /  ALT  11  /  AlkPhos  139<H>  11-02              WBC:  WBC Count: 8.30 K/uL (11-02 @ 06:18)  WBC Count: 11.33 K/uL (11-01 @ 07:12)  WBC Count: 8.82 K/uL (10-31 @ 05:30)  WBC Count: 7.09 K/uL (10-30 @ 07:02)      MICROBIOLOGY:  RECENT CULTURES:                  Sodium:  Sodium, Serum: 137 mmol/L (11-02 @ 06:18)  Sodium, Serum: 134 mmol/L (11-01 @ 07:12)  Sodium, Serum: 135 mmol/L (10-31 @ 05:30)  Sodium, Serum: 135 mmol/L (10-30 @ 07:02)      0.80 mg/dL 11-02 @ 06:18  0.96 mg/dL 11-01 @ 07:12  1.01 mg/dL 10-31 @ 05:30  0.87 mg/dL 10-30 @ 07:02      Hemoglobin:  Hemoglobin: 9.2 g/dL (11-02 @ 06:18)  Hemoglobin: 9.1 g/dL (11-01 @ 07:12)  Hemoglobin: 9.0 g/dL (10-31 @ 17:31)  Hemoglobin: 7.2 g/dL (10-31 @ 05:30)  Hemoglobin: 7.4 g/dL (10-30 @ 07:02)      Platelets: Platelet Count - Automated: 167 K/uL (11-02 @ 06:18)  Platelet Count - Automated: 169 K/uL (11-01 @ 07:12)  Platelet Count - Automated: 159 K/uL (10-31 @ 05:30)  Platelet Count - Automated: 157 K/uL (10-30 @ 07:02)      LIVER FUNCTIONS - ( 02 Nov 2022 06:18 )  Alb: 1.6 g/dL / Pro: 6.2 g/dL / ALK PHOS: 139 U/L / ALT: 11 U/L DA / AST: 14 U/L / GGT: x                 RADIOLOGY & ADDITIONAL STUDIES:      MICROBIOLOGY:  RECENT CULTURES:

## 2022-11-02 NOTE — DISCHARGE NOTE PROVIDER - NSDCHOSPICE_GEN_A_CORE
Prep Survey      Responses   Facility patient discharged from?  [facility]   Is patient eligible?  Yes   Discharge diagnosis  S/P NEHAL C5-6, EOF C5-6, ACDF with instrumentation C3-5   Does the patient have one of the following disease processes/diagnoses(primary or secondary)?  General Surgery   Does the patient have Home health ordered?  No   Is there a DME ordered?  No   Comments regarding appointments  See AVS   Prep survey completed?  Yes          Mikki Hdz RN        
No

## 2022-11-02 NOTE — PROGRESS NOTE ADULT - SUBJECTIVE AND OBJECTIVE BOX
INTERVAL HPI/OVERNIGHT EVENTS:  No new overnight event.  No N/V/D.  Tolerating diet via peg    Allergies    codeine (Hives)    Intolerances    General:  No wt loss, fevers, chills, night sweats, fatigue,   Eyes:  Good vision, no reported pain  ENT:  No sore throat, pain, runny nose, dysphagia  CV:  No pain, palpitations, hypo/hypertension  Resp:  No dyspnea, cough, tachypnea, wheezing  GI:  No pain, No nausea, No vomiting, No diarrhea, No constipation, No weight loss, No fever, No pruritis, No rectal bleeding, No tarry stools, No dysphagia,  :  No pain, bleeding, incontinence, nocturia  Muscle:  No pain, weakness  Neuro:  No weakness, tingling, memory problems  Psych:  No fatigue, insomnia, mood problems, depression  Endocrine:  No polyuria, polydipsia, cold/heat intolerance  Heme:  No petechiae, ecchymosis, easy bruisability  Skin:  No rash, tattoos, scars, edema      PHYSICAL EXAM:   Vital Signs:  Vital Signs Last 24 Hrs  T(C): 36.6 (2022 10:00), Max: 37.6 (2022 12:26)  T(F): 97.9 (2022 10:00), Max: 99.6 (2022 12:)  HR: 60 (2022 10:00) (60 - 109)  BP: 112/70 (2022 10:00) (104/60 - 166/80)  BP(mean): 83 (2022 10:00) (74 - 105)  RR: 29 (2022 10:00) (16 - 30)  SpO2: 100% (2022 10:00) (93% - 100%)    Parameters below as of 2022 10:00  Patient On (Oxygen Delivery Method): ventilator    O2 Concentration (%): 50  Daily     Daily Weight in k.7 (2022 03:56)I&O's Summary    2022 07:01  -  2022 07:00  --------------------------------------------------------  IN: 1510 mL / OUT: 1550 mL / NET: -40 mL    2022 07:01  -  2022 10:07  --------------------------------------------------------  IN: 280 mL / OUT: 0 mL / NET: 280 mL        GENERAL:  Appears stated age, well-groomed, well-nourished, no distress  HEENT:  NC/AT,  conjunctivae clear and pink, no thyromegaly, nodules, adenopathy, no JVD, sclera -anicteric  CHEST:  Full & symmetric excursion, no increased effort, breath sounds clear  HEART:  Regular rhythm, S1, S2, no murmur/rub/S3/S4, no abdominal bruit, no edema  ABDOMEN:  Soft, non-tender, non-distended, normoactive bowel sounds,  no masses ,no hepato-splenomegaly, no signs of chronic liver disease  EXTEREMITIES:  no cyanosis,clubbing or edema  SKIN:  No rash/erythema/ecchymoses/petechiae/wounds/abscess/warm/dry  NEURO:  Alert, oriented, no asterixis, no tremor, no encephalopathy      LABS:                        9.2    8.30  )-----------( 167      ( 2022 06:18 )             30.7     11-02    137  |  103  |  25<H>  ----------------------------<  151<H>  4.7   |  27  |  0.80    Ca    7.7<L>      2022 06:18    TPro  6.2  /  Alb  1.6<L>  /  TBili  0.5  /  DBili  x   /  AST  14  /  ALT  11  /  AlkPhos  139<H>  11-02        amylase   lipase  RADIOLOGY & ADDITIONAL TESTS:

## 2022-11-02 NOTE — DISCHARGE NOTE PROVIDER - HOSPITAL COURSE
HPI:  76F with dementia, COPD with chronic resp failure and is vent dependent, s/p PEG, hx of cardiac arrest, CHF, cor pulmonale, CVA, COVID-19 infxn, CKD, anemia, multiple admissions for respiratory distress (last admission from 6/1-6/9  and now august diagnosed with PNA with parapneumonic pleural effusion s/p course of ertapenam, who presents from [facility] for respiratory distress . Patient not verbal, demented unable to obtain any hx    ER course:  Vitals stable afebrile  Imaging:  labs noted:hb 7.4., wbc 13.,  Bun 122, Cr 2.4   (18 Oct 2022 13:07)    77yo F with PMH of dementia, COPD with chronic resp failure and is vent dependent, s/p PEG, hx of cardiac arrest, diastolic CHF, cor pulmonale, hx of CVA, COVID-19 infxn, CKD, anemia, multiple admissions for respiratory failure (recent admissions in 06/2022 and 08/2022 diagnosed with PNA with parapneumonic pleural effusion s/p thoracentesis and Avycaz) who presents from [facility] for respiratory distress again a/w sepsis and acute on chronic hypoxic respiratory failure due to suspected recurrent VAP.    Severe sepsis and acute hypoxic respiratory failure 2/2 gram-negative PNA   - titrating down FiO2 on vent - have had success maintaining saturation on 40% FiO2 now -- continue to taper down as able  - completed 7-day course of levaquin per ID for PNA (Stenotrophomonas on sputum culture) ; and 10-day course of rocephin for potential CAUTI  - ID (Karon/[facility] group), recs appreciated   - procalcitonin high but improving 23 ->6.6 within a couple of days of starting Abx and now down to 0.47 on 10/27/22  - cautious use of fluids given hx of CHF and severe hypoalbuminemia (received 1750cc of NS in ED)  - blood cultures (NGTD), urine culture growing E coli sensitive to rocephin  - trach sputum culture - grew Stenotrophomonas sensitive to levaquin  - Checked CT Abd/P to eval for any other potential source sign of infection and found no significant sign of intra-abdominal infection on CT  - c/w midodrine with hold parameters -- consider titrating dose if BP elevated  - cc/pulm consult (Jaime), recs appreciated  - palliative care (Emre), recs appreciated - pt is full code  - leukocytosis resolved  completed antibiotic course  cleared by ID to be discharged    Diastolic CHF  Hx of Pleural effusions  - last TTE was 5/30 with EF 65% with normal LVSF, dilated RV, and moderate pHTN -> repeat TTE appears unchanged  - pt also with severe hypoalbuminemia   - may need repeat thoracentesis (had 1.5L drained few admissions ago), pulmonology consulted; pl effusion was parapneumonic on past admission as opposed to transudative and thus less likely related to CHF    RYAN on CKD 3  - likely in part ATN due to sepsis and in part prerenal   - renal function recovered to baseline CKD 3  - renally dose medications and monitor BMP and electrolytes   - given pt has low muscle mass from being bedbound for years, this GFR estimate is likely falsely high  - monitor BMP  - hypokalemia repleted with KCl    Anemia of chronic disease  - has been evaluated by GI and hematology in the past -> dx with anemia of chronic disease with intermittent GI bleeding  - s/p 2 unit PRBC and will transfuse 1U today 10/31/22  - current Hgb generally stable ~ 7.5-8; monitor for drop and possible need for another PRBC - will order T&S for AM in case Hgb downtrends  - no jacquleine GI bleed per nursing     hx of HTN  - not on any anti-hypertensive meds at facility  - monitor VS    DM2  - NPO with TF  - c/w moderate dose lispro coverage sliding scale q6h  - goal -180 - now at goal - c/w lantus 5un sq QAM     Diet  - TF was on hold 2/2 leak around PEG site, GI readjusted PEG and not leaking currently -- c/w tube feeds per dietician recs  - GI (Leia/[facility] group), recs appreciated HPI:  76F with dementia, COPD with chronic resp failure and is vent dependent, s/p PEG, hx of cardiac arrest, CHF, cor pulmonale, CVA, COVID-19 infxn, CKD, anemia, multiple admissions for respiratory distress (last admission from 6/1-6/9  and now august diagnosed with PNA with parapneumonic pleural effusion s/p course of ertapenam, who presents from [facility] for respiratory distress . Patient not verbal, demented unable to obtain any hx    ER course:  Vitals stable afebrile  Imaging:  labs noted:hb 7.4., wbc 13.,  Bun 122, Cr 2.4   (18 Oct 2022 13:07)    77yo F with PMH of dementia, COPD with chronic resp failure and is vent dependent, s/p PEG, hx of cardiac arrest, diastolic CHF, cor pulmonale, hx of CVA, COVID-19 infxn, CKD, anemia, multiple admissions for respiratory failure (recent admissions in 06/2022 and 08/2022 diagnosed with PNA with parapneumonic pleural effusion s/p thoracentesis and Avycaz) who presents from [facility] for respiratory distress again a/w sepsis and acute on chronic hypoxic respiratory failure due to suspected recurrent VAP.    Severe sepsis and acute hypoxic respiratory failure 2/2 gram-negative PNA   - titrating down FiO2 on vent - have had success maintaining saturation on 40% FiO2 now -- continue to taper down as able  - completed 7-day course of levaquin per ID for PNA (Stenotrophomonas on sputum culture) ; and 10-day course of rocephin for potential CAUTI  - ID (Karon/Brendon group), recs appreciated   - procalcitonin high but improving 23 ->6.6 within a couple of days of starting Abx and now down to 0.47 on 10/27/22  - cautious use of fluids given hx of CHF and severe hypoalbuminemia (received 1750cc of NS in ED)  - blood cultures (NGTD), urine culture growing E coli sensitive to rocephin  - trach sputum culture - grew Stenotrophomonas sensitive to levaquin  - Checked CT Abd/P to eval for any other potential source sign of infection and found no significant sign of intra-abdominal infection on CT  - c/w midodrine with hold parameters -- consider titrating dose if BP elevated  - cc/pulm consult (Jaime), recs appreciated  - palliative care (Emre), recs appreciated - pt is full code  - leukocytosis resolved  completed antibiotic course  cleared by ID to be discharged    Diastolic CHF  Hx of Pleural effusions  - last TTE was 5/30 with EF 65% with normal LVSF, dilated RV, and moderate pHTN -> repeat TTE appears unchanged    RYAN on CKD 3  - likely in part ATN due to sepsis and in part prerenal   - renal function recovered to baseline CKD 3  - renally dose medications and monitor BMP and electrolytes   - given pt has low muscle mass from being bedbound for years, this GFR estimate is likely falsely high  WNL     Anemia of chronic disease  - has been evaluated by GI and hematology in the past -> dx with anemia of chronic disease with intermittent GI bleeding  - s/p 2 unit PRBC and will transfuse 1U today 10/31/22  - current Hgb generally stable ~ 7.5-8; monitor for drop and possible need for another PRBC - will order T&S for AM in case Hgb downtrends  - no jacqueline GI bleed per nursing     hx of HTN  - not on any anti-hypertensive meds at facility  - monitor VS    DM2  - NPO with TF  - c/w moderate dose lispro coverage sliding scale q6h  - goal -180 - now at goal - c/w lantus 5un sq QAM     Diet  - TF was on hold 2/2 leak around PEG site, GI readjusted PEG and not leaking currently -- c/w tube feeds per dietician recs  - GI (Leia/ group), recs appreciated

## 2022-11-02 NOTE — PROGRESS NOTE ADULT - ASSESSMENT
Pt is a 76W w/ PMHx of DM2, COPD, HTN, dementia, hx of subarachnoid hemorrhage, and history of chronic trach brought in by ambulance for evaluation of low CO2 and cyanotic appearance.   Well-known and has had multiple admissions for Acute on chronic RF and PNA w/ MDROs    Acute VAP/PNA/Acute on chronic HF/CAUTI/UTI  - pt p/w cyanosis  - most recent admission in end of August, most recent cx w/ Pseudomonas and Stenotrophomonas  - CT chest Groundglass opacities and interlobular septal thickening suggestive of CHF. There are also some more confluent areas of density   which may represent atelectasis versus infiltrates.Bilateral pleural effusions have decreased from the prior exam  - UCx E.coli   - BCx NGTD  - sputum cx w/ Stenotrophomonas maltophilia  Plan:   S/p levofloxacin x7 day course, completed PNA tx  S/p ceftriaxone x10 day course, completed UTI tx  Trend temps and cbc--fevers resolved  Pulmonary toilet  Isolation per infection control policy given hx of CRE  Supportive care/vent management per [facility]    Stable from ID standpoint  D/c planning per primary team    Infectious Diseases will continue to follow. Please call with any questions.   Andressa Brantley M.D.  [facility] Division of Infectious Diseases [phone]

## 2022-11-02 NOTE — PROGRESS NOTE ADULT - REASON FOR ADMISSION
Acute on Hypoxemic respiratory failure
Acute on chronic hypoxemic respiratory failure
Acute on Hypoxemic respiratory failure
Acute on chronic hypoxemic respiratory failure

## 2022-11-02 NOTE — PROGRESS NOTE ADULT - ASSESSMENT
REVIEW OF SYMPTOMS      Able to give (reliable) ROS  NO     PHYSICAL EXAM    HEENT Unremarkable  atraumatic   RESP Fair air entry EXP prolonged    Harsh breath sound Resp distres mild   CARDIAC S1 S2 No S3     NO JVD    ABDOMEN SOFT BS PRESENT NOT DISTENDED No hepatosplenomegaly   PEDAL EDEMA present No calf tenderness  NO rash       GENERAL DATA .   GOC.  10/18/2022 full code       ALLGY. codeine                            WT.          10/18/2022 48  BMI.          10/18/2022 17                     ICU STAY. 10/18/2022  COVID.  10/18/2022 scv2 (-)     BEST PRACTICE ISSUES.    HOB ELEVATN. Yes  DVT PPLX.   10/18/2022 hpsc    SQUIRES PPLX.  10/18/2022 protonix 40     INFN PPLX.    10/18/2022 ch;lorhexidine .12%  10/19/2022 chlorhexidine 2%    SP SW EM.        DIET.     10/19/2022 nepro 800 gt    VS/ PO/IO/ VENT/ DRIPS.  11/2/2022 afeb 60 130/60   11/2/2022 ac 18/400/5/.5     ASSESSMENT/RECOMMENDATIONS .   RESP.  -- Gas exchange.   10/18/2022 ac 18/400/100/5 751/37/257  -- VENT MANAGEMENT.   HOB elevation  Target Pplat 30 (-)  Target PO 90-95%  Target pH 730 (+)  Daily spontaneous breathing trials   Daily sedation vacation   -- Pleuralk effsn  Past ritchie   R THORAC   6/8/2022  g 161 l 222 p 4.9 p 10 l 16 .38    L THORAC   5/25/2022 g 183 l 144/381 .37 p 3.4/5.3 .64 p 23 l 36   5/25/2022l pl fluid  lymph pred exudate   cyto (-)     Ct  10/18/2022 sm r mod l effs large subpulmonic component   a/r No thoracentesis plannd at this point risk prohibitive in vent pt   -- Mediastinal lne.  Ct  10/18/2022 again enlarged mediastinal lns likely reactive   a/r will need followup with ct in 2m and consider biopsy if persists   -- wheeze.  10/18 albuterol hf  10/18 atrovent hf   INFECTION.  PAST ID ISSUES   8/19/2022  zosyn Se Vignesh  8/19/2022 bactrim ds 2 bid   pmh 5/2/2022 SERRATIA CARBAPENEM RESISTANT PSEUDOMONAS  -- VAP 10/18/2022.  -- UTI 10/18/2022   w 10/18-10/19-10/20-10/21-10/23-10/25-10/26-10/27-10/28-11/1/2022 w 13 - 8.8 - 15-10.3 - 9.4- 12.3 - 15.6- 11.6  - 10 - 8 - 11.3   pr 10/20-10/27/2022 6.6  - .4   ua 10/18/2022 w 11-25   ct ch 10/18/2022 cw 8/18/20232 ggo post upper lobes with interlobular septal thickening infiltrate or atelectasis lower lobes l more than   sm r and sm to mod l effsn  rvp 10/18/2022 (-)   uc 10/18 100K E coli   sp 10/19 Stenotrophomonas   bc 10/18 (-)   10/18/2022 meropenem Dr NEWTON  dc  10/18/2022 ID Dr Brantley on case   10/19-10/26/2022 levaquin 750 x 7 d   10/21/2022 rocephin x 10d Dr BRITTANY Brantley      CARDIAC.  -- Hypotension.  10/18/2022 88/48   echo 8/19/2022 n lvsf dd1 pasp 58   10/20 midocrine 10.3   Target MAP 65 (+)   resolvd   GI.  -- ELEVATED LFTS.  LFTS 10/18-10/19-10/20/2022   ap 140-131- 104  ast - 44  alt 21 - 103 - 64   monitor  HEMAT.  -- Anemia.  Hb 10/18-10/19-10/20-10/21-10/22-10/23-10/24-10/25-10/26-10/27-10/28-10/29-10/30-10/31-11/1-11/2/2022 Hb 7.4 -  6.3 - 8.2  - 7.4 - 8 - 8.7 - 8.4 - 8.8 - 7.7 - 8 - 7.8 - 8.3- 7.4- 7.2 - 9.1- 9.2   inr 10/18/2022 inr 1.23   ctap 10/20 No rp bl  Monitor  target Hb 7 (+)   -- TRANSFUSION  10/19/2022 2 U PRBC   10/31/2022 1 u porbc     OVERALL ASSESSMENT/DISPOSITION.  76 f PMH trach peg cva cac anoxic encephalo PH 8/19/2022 pasp 58 bl pl effs  r thora 6/8/2022 and l thora 5/25/2022 were lp exudates  recurrent hospitalizations HO CR psuedomonas 5/2/2022 zosyn 8/19/2022 admitted 10/18/2022 with resp distress     VAP   UTI   uc 10/18 100K E coli   sp 10/19 Stenotrophomonas   10/19-10/26/2022 levaquin 750   10/21/2022 rocephin x 7d Dr BRITTANY Brantley   Vent mgmt   Hypotension  10/20 midocrine 10.3  Anemia  10/19/2022 2 U PRBC   10/31/2022 1 u prbc  Woody 10/23/2022      TIME SPENT   Over 39 minutes aggregate critical care time spent on encounter; activities included   direct patient care, counseling and/or coordinating care reviewing notes, lab data/ imaging , discussion with multidisciplinary team/ patient  /family and explaining in detail risks, benefits, alternatives  of the recommendations     CHAPINCITO GUIDRY 76 f [facility] S 10/18/2022   DR ANG PADGETT

## 2022-11-02 NOTE — DISCHARGE NOTE PROVIDER - NSDCMRMEDTOKEN_GEN_ALL_CORE_FT
albuterol 90 mcg/inh inhalation aerosol with adapter: 2  inhaled every 6 hours  Bacid (LAC) oral tablet: 2 tab(s) by gastrostomy tube once a day  Betadine 10% topical swab: cleanse right and left great toes  chlorhexidine 0.12% mucous membrane liquid: 15 milliliter(s) mucous membrane 2 times a day  Eucerin topical cream: Apply topically to affected area once a day bilateral feet  insulin glargine 100 units/mL subcutaneous solution: 8 unit(s) subcutaneous once a day (in the morning)  ipratropium-albuterol 0.5 mg-2.5 mg/3 mL inhalation solution: 3 milliliter(s) inhaled 4 times a day  LORazepam 1 mg oral tablet: 1 tab(s) by gastrostomy tube every 4 hours  methocarbamol 500 mg oral tablet: 1 tab(s) by gastrostomy tube 2 times a day  MiraLax oral powder for reconstitution: g-tube  Multiple Vitamins oral tablet: 1 tab(s) orally once a day  nystatin 100,000 units/g topical powder: 1 application topically 3 times a day  omeprazole 20 mg oral delayed release capsule: orally 2 times a day  polyethylene glycol 3350 oral powder for reconstitution: 17 gram(s) by gastrostomy tube every 12 hours  senna 8.6 mg oral tablet: 3 tab(s) by gastrostomy tube once a day (at bedtime)  simethicone 80 mg oral tablet: g-tube  simethicone 80 mg oral tablet, chewable: 1 tab(s) by gastrostomy tube every 6 hours  sucralfate 1 g/10 mL oral suspension: 10 milliliter(s) g-tube 4 times a day (before meals and at bedtime)  Tylenol 325 mg oral tablet: 2 tab(s) by gastrostomy tube once a day; 60 minutes prior to dressing change   Tylenol 325 mg oral tablet: 2 tab(s) by gastrostomy tube every 6 hours, As Needed   albuterol 90 mcg/inh inhalation aerosol with adapter: 2  inhaled every 6 hours  Bacid (LAC) oral tablet: 2 tab(s) by gastrostomy tube once a day  Betadine 10% topical swab: cleanse right and left great toes  chlorhexidine 0.12% mucous membrane liquid: 15 milliliter(s) mucous membrane 2 times a day  Eucerin topical cream: Apply topically to affected area once a day bilateral feet  insulin glargine 100 units/mL subcutaneous solution: 8 unit(s) subcutaneous once a day (in the morning)  ipratropium-albuterol 0.5 mg-2.5 mg/3 mL inhalation solution: 3 milliliter(s) inhaled 4 times a day  LORazepam 1 mg oral tablet: 1 tab(s) by gastrostomy tube every 4 hours  midodrine 10 mg oral tablet: 1 tab(s) orally every 8 hours  MiraLax oral powder for reconstitution: g-tube  Multiple Vitamins oral tablet: 1 tab(s) orally once a day  nystatin 100,000 units/g topical powder: 1 application topically 3 times a day  omeprazole 20 mg oral delayed release capsule: orally 2 times a day  polyethylene glycol 3350 oral powder for reconstitution: 17 gram(s) by gastrostomy tube every 12 hours  senna 8.6 mg oral tablet: 3 tab(s) by gastrostomy tube once a day (at bedtime)  simethicone 80 mg oral tablet, chewable: 1 tab(s) by gastrostomy tube every 6 hours  sucralfate 1 g/10 mL oral suspension: 10 milliliter(s) g-tube 4 times a day (before meals and at bedtime)  Tylenol 325 mg oral tablet: 2 tab(s) by gastrostomy tube once a day; 60 minutes prior to dressing change   Tylenol 325 mg oral tablet: 2 tab(s) by gastrostomy tube every 6 hours, As Needed

## 2022-11-02 NOTE — PROGRESS NOTE ADULT - SUBJECTIVE AND OBJECTIVE BOX
Chief Complaint: Hypoxia    Interval Events: No events overnight.    Review of Systems:  Unable to obtain    Physical Exam:  Vital Signs Last 24 Hrs  T(C): 36.6 (02 Nov 2022 10:00), Max: 37.6 (01 Nov 2022 12:26)  T(F): 97.9 (02 Nov 2022 10:00), Max: 99.6 (01 Nov 2022 12:26)  HR: 60 (02 Nov 2022 10:00) (60 - 109)  BP: 112/70 (02 Nov 2022 10:00) (104/60 - 166/80)  BP(mean): 83 (02 Nov 2022 10:00) (74 - 105)  RR: 29 (02 Nov 2022 10:00) (16 - 30)  SpO2: 100% (02 Nov 2022 10:00) (93% - 100%)  Parameters below as of 02 Nov 2022 10:00  Patient On (Oxygen Delivery Method): ventilator  O2 Concentration (%): 50  General: Unresponsive  HEENT: Trach  Neck: No JVD, no carotid bruit  Lungs: CTAB  CV: RRR, nl S1/S2, no M/R/G  Abdomen: S/NT/ND, +BS  Extremities: No LE edema, no cyanosis  Neuro: AAOx0  Skin: No rash    Labs:    11-02    137  |  103  |  25<H>  ----------------------------<  151<H>  4.7   |  27  |  0.80    Ca    7.7<L>      02 Nov 2022 06:18    TPro  6.2  /  Alb  1.6<L>  /  TBili  0.5  /  DBili  x   /  AST  14  /  ALT  11  /  AlkPhos  139<H>  11-02                        9.2    8.30  )-----------( 167      ( 02 Nov 2022 06:18 )             30.7       Telemetry: Sinus rhythm

## 2022-11-04 ENCOUNTER — INPATIENT (INPATIENT)
Facility: HOSPITAL | Age: 77
LOS: 5 days | Discharge: NURSING, REHAB OR LONG-TERM CARE | DRG: 870 | End: 2022-11-10
Attending: HOSPITALIST | Admitting: HOSPITALIST
Payer: MEDICARE

## 2022-11-04 VITALS
RESPIRATION RATE: 16 BRPM | SYSTOLIC BLOOD PRESSURE: 107 MMHG | DIASTOLIC BLOOD PRESSURE: 55 MMHG | HEIGHT: 65 IN | TEMPERATURE: 99 F | HEART RATE: 93 BPM | OXYGEN SATURATION: 93 %

## 2022-11-04 DIAGNOSIS — Z93.1 GASTROSTOMY STATUS: Chronic | ICD-10-CM

## 2022-11-04 DIAGNOSIS — J18.9 PNEUMONIA, UNSPECIFIED ORGANISM: ICD-10-CM

## 2022-11-04 DIAGNOSIS — Z93.0 TRACHEOSTOMY STATUS: Chronic | ICD-10-CM

## 2022-11-04 LAB
ALBUMIN SERPL ELPH-MCNC: 1.9 G/DL — LOW (ref 3.3–5)
ALP SERPL-CCNC: 159 U/L — HIGH (ref 30–120)
ALT FLD-CCNC: 12 U/L DA — SIGNIFICANT CHANGE UP (ref 10–60)
ANION GAP SERPL CALC-SCNC: 11 MMOL/L — SIGNIFICANT CHANGE UP (ref 5–17)
APPEARANCE UR: CLEAR — SIGNIFICANT CHANGE UP
APTT BLD: 41.1 SEC — HIGH (ref 27.5–35.5)
AST SERPL-CCNC: 18 U/L — SIGNIFICANT CHANGE UP (ref 10–40)
BACTERIA # UR AUTO: ABNORMAL
BASOPHILS # BLD AUTO: 0.02 K/UL — SIGNIFICANT CHANGE UP (ref 0–0.2)
BASOPHILS NFR BLD AUTO: 0.2 % — SIGNIFICANT CHANGE UP (ref 0–2)
BILIRUB SERPL-MCNC: 0.5 MG/DL — SIGNIFICANT CHANGE UP (ref 0.2–1.2)
BILIRUB UR-MCNC: NEGATIVE — SIGNIFICANT CHANGE UP
BUN SERPL-MCNC: 25 MG/DL — HIGH (ref 7–23)
CALCIUM SERPL-MCNC: 8.5 MG/DL — SIGNIFICANT CHANGE UP (ref 8.4–10.5)
CHLORIDE SERPL-SCNC: 102 MMOL/L — SIGNIFICANT CHANGE UP (ref 96–108)
CO2 SERPL-SCNC: 25 MMOL/L — SIGNIFICANT CHANGE UP (ref 22–31)
COLOR SPEC: YELLOW — SIGNIFICANT CHANGE UP
COMMENT - URINE: SIGNIFICANT CHANGE UP
CREAT SERPL-MCNC: 0.94 MG/DL — SIGNIFICANT CHANGE UP (ref 0.5–1.3)
DIFF PNL FLD: ABNORMAL
EGFR: 62 ML/MIN/1.73M2 — SIGNIFICANT CHANGE UP
EOSINOPHIL # BLD AUTO: 0.06 K/UL — SIGNIFICANT CHANGE UP (ref 0–0.5)
EOSINOPHIL NFR BLD AUTO: 0.5 % — SIGNIFICANT CHANGE UP (ref 0–6)
EPI CELLS # UR: SIGNIFICANT CHANGE UP
GLUCOSE BLDC GLUCOMTR-MCNC: 136 MG/DL — HIGH (ref 70–99)
GLUCOSE SERPL-MCNC: 186 MG/DL — HIGH (ref 70–99)
GLUCOSE UR QL: NEGATIVE MG/DL — SIGNIFICANT CHANGE UP
HCT VFR BLD CALC: 35.3 % — SIGNIFICANT CHANGE UP (ref 34.5–45)
HGB BLD-MCNC: 10.5 G/DL — LOW (ref 11.5–15.5)
IMM GRANULOCYTES NFR BLD AUTO: 0.5 % — SIGNIFICANT CHANGE UP (ref 0–0.9)
INR BLD: 1.21 RATIO — HIGH (ref 0.88–1.16)
KETONES UR-MCNC: NEGATIVE — SIGNIFICANT CHANGE UP
LACTATE SERPL-SCNC: 1.7 MMOL/L — SIGNIFICANT CHANGE UP (ref 0.7–2)
LEUKOCYTE ESTERASE UR-ACNC: ABNORMAL
LYMPHOCYTES # BLD AUTO: 0.57 K/UL — LOW (ref 1–3.3)
LYMPHOCYTES # BLD AUTO: 5.2 % — LOW (ref 13–44)
MCHC RBC-ENTMCNC: 27.7 PG — SIGNIFICANT CHANGE UP (ref 27–34)
MCHC RBC-ENTMCNC: 29.7 GM/DL — LOW (ref 32–36)
MCV RBC AUTO: 93.1 FL — SIGNIFICANT CHANGE UP (ref 80–100)
MONOCYTES # BLD AUTO: 0.59 K/UL — SIGNIFICANT CHANGE UP (ref 0–0.9)
MONOCYTES NFR BLD AUTO: 5.4 % — SIGNIFICANT CHANGE UP (ref 2–14)
NEUTROPHILS # BLD AUTO: 9.7 K/UL — HIGH (ref 1.8–7.4)
NEUTROPHILS NFR BLD AUTO: 88.2 % — HIGH (ref 43–77)
NITRITE UR-MCNC: NEGATIVE — SIGNIFICANT CHANGE UP
NRBC # BLD: 0 /100 WBCS — SIGNIFICANT CHANGE UP (ref 0–0)
PH UR: 6 — SIGNIFICANT CHANGE UP (ref 5–8)
PLATELET # BLD AUTO: 184 K/UL — SIGNIFICANT CHANGE UP (ref 150–400)
POTASSIUM SERPL-MCNC: 4.9 MMOL/L — SIGNIFICANT CHANGE UP (ref 3.5–5.3)
POTASSIUM SERPL-SCNC: 4.9 MMOL/L — SIGNIFICANT CHANGE UP (ref 3.5–5.3)
PROT SERPL-MCNC: 7.7 G/DL — SIGNIFICANT CHANGE UP (ref 6–8.3)
PROT UR-MCNC: 100 MG/DL
PROTHROM AB SERPL-ACNC: 14.3 SEC — HIGH (ref 10.5–13.4)
RBC # BLD: 3.79 M/UL — LOW (ref 3.8–5.2)
RBC # FLD: 17.2 % — HIGH (ref 10.3–14.5)
RBC CASTS # UR COMP ASSIST: SIGNIFICANT CHANGE UP /HPF (ref 0–4)
SARS-COV-2 RNA SPEC QL NAA+PROBE: SIGNIFICANT CHANGE UP
SODIUM SERPL-SCNC: 138 MMOL/L — SIGNIFICANT CHANGE UP (ref 135–145)
SP GR SPEC: 1.01 — SIGNIFICANT CHANGE UP (ref 1.01–1.02)
UROBILINOGEN FLD QL: NEGATIVE MG/DL — SIGNIFICANT CHANGE UP
WBC # BLD: 10.99 K/UL — HIGH (ref 3.8–10.5)
WBC # FLD AUTO: 10.99 K/UL — HIGH (ref 3.8–10.5)
WBC UR QL: ABNORMAL

## 2022-11-04 PROCEDURE — 93970 EXTREMITY STUDY: CPT | Mod: 26

## 2022-11-04 PROCEDURE — 93010 ELECTROCARDIOGRAM REPORT: CPT

## 2022-11-04 PROCEDURE — 99223 1ST HOSP IP/OBS HIGH 75: CPT | Mod: AI

## 2022-11-04 PROCEDURE — 71045 X-RAY EXAM CHEST 1 VIEW: CPT | Mod: 26

## 2022-11-04 PROCEDURE — 99291 CRITICAL CARE FIRST HOUR: CPT | Mod: CS

## 2022-11-04 RX ORDER — SIMETHICONE 80 MG/1
80 TABLET, CHEWABLE ORAL EVERY 6 HOURS
Refills: 0 | Status: DISCONTINUED | OUTPATIENT
Start: 2022-11-04 | End: 2022-11-10

## 2022-11-04 RX ORDER — PIPERACILLIN AND TAZOBACTAM 4; .5 G/20ML; G/20ML
3.38 INJECTION, POWDER, LYOPHILIZED, FOR SOLUTION INTRAVENOUS ONCE
Refills: 0 | Status: COMPLETED | OUTPATIENT
Start: 2022-11-04 | End: 2022-11-04

## 2022-11-04 RX ORDER — GLUCAGON INJECTION, SOLUTION 0.5 MG/.1ML
1 INJECTION, SOLUTION SUBCUTANEOUS ONCE
Refills: 0 | Status: DISCONTINUED | OUTPATIENT
Start: 2022-11-04 | End: 2022-11-10

## 2022-11-04 RX ORDER — SODIUM CHLORIDE 9 MG/ML
1000 INJECTION, SOLUTION INTRAVENOUS
Refills: 0 | Status: DISCONTINUED | OUTPATIENT
Start: 2022-11-04 | End: 2022-11-10

## 2022-11-04 RX ORDER — DEXTROSE 50 % IN WATER 50 %
12.5 SYRINGE (ML) INTRAVENOUS ONCE
Refills: 0 | Status: DISCONTINUED | OUTPATIENT
Start: 2022-11-04 | End: 2022-11-10

## 2022-11-04 RX ORDER — ACETAMINOPHEN 500 MG
650 TABLET ORAL EVERY 6 HOURS
Refills: 0 | Status: DISCONTINUED | OUTPATIENT
Start: 2022-11-04 | End: 2022-11-10

## 2022-11-04 RX ORDER — SENNA PLUS 8.6 MG/1
2 TABLET ORAL AT BEDTIME
Refills: 0 | Status: DISCONTINUED | OUTPATIENT
Start: 2022-11-04 | End: 2022-11-10

## 2022-11-04 RX ORDER — SODIUM CHLORIDE 9 MG/ML
2000 INJECTION INTRAMUSCULAR; INTRAVENOUS; SUBCUTANEOUS ONCE
Refills: 0 | Status: COMPLETED | OUTPATIENT
Start: 2022-11-04 | End: 2022-11-04

## 2022-11-04 RX ORDER — INSULIN LISPRO 100/ML
VIAL (ML) SUBCUTANEOUS EVERY 6 HOURS
Refills: 0 | Status: DISCONTINUED | OUTPATIENT
Start: 2022-11-04 | End: 2022-11-10

## 2022-11-04 RX ORDER — PANTOPRAZOLE SODIUM 20 MG/1
40 TABLET, DELAYED RELEASE ORAL
Refills: 0 | Status: DISCONTINUED | OUTPATIENT
Start: 2022-11-04 | End: 2022-11-10

## 2022-11-04 RX ORDER — CHLORHEXIDINE GLUCONATE 213 G/1000ML
15 SOLUTION TOPICAL EVERY 12 HOURS
Refills: 0 | Status: DISCONTINUED | OUTPATIENT
Start: 2022-11-04 | End: 2022-11-04

## 2022-11-04 RX ORDER — DEXTROSE 50 % IN WATER 50 %
15 SYRINGE (ML) INTRAVENOUS ONCE
Refills: 0 | Status: DISCONTINUED | OUTPATIENT
Start: 2022-11-04 | End: 2022-11-10

## 2022-11-04 RX ORDER — HEPARIN SODIUM 5000 [USP'U]/ML
5000 INJECTION INTRAVENOUS; SUBCUTANEOUS EVERY 12 HOURS
Refills: 0 | Status: DISCONTINUED | OUTPATIENT
Start: 2022-11-04 | End: 2022-11-10

## 2022-11-04 RX ORDER — LACTOBACILLUS ACIDOPHILUS 100MM CELL
1 CAPSULE ORAL DAILY
Refills: 0 | Status: DISCONTINUED | OUTPATIENT
Start: 2022-11-04 | End: 2022-11-10

## 2022-11-04 RX ORDER — VANCOMYCIN HCL 1 G
1000 VIAL (EA) INTRAVENOUS EVERY 12 HOURS
Refills: 0 | Status: COMPLETED | OUTPATIENT
Start: 2022-11-05 | End: 2022-11-05

## 2022-11-04 RX ORDER — VANCOMYCIN HCL 1 G
VIAL (EA) INTRAVENOUS
Refills: 0 | Status: COMPLETED | OUTPATIENT
Start: 2022-11-04 | End: 2022-11-05

## 2022-11-04 RX ORDER — POLYETHYLENE GLYCOL 3350 17 G/17G
17 POWDER, FOR SOLUTION ORAL DAILY
Refills: 0 | Status: DISCONTINUED | OUTPATIENT
Start: 2022-11-04 | End: 2022-11-10

## 2022-11-04 RX ORDER — DEXTROSE 50 % IN WATER 50 %
25 SYRINGE (ML) INTRAVENOUS ONCE
Refills: 0 | Status: DISCONTINUED | OUTPATIENT
Start: 2022-11-04 | End: 2022-11-10

## 2022-11-04 RX ORDER — CHLORHEXIDINE GLUCONATE 213 G/1000ML
15 SOLUTION TOPICAL EVERY 12 HOURS
Refills: 0 | Status: DISCONTINUED | OUTPATIENT
Start: 2022-11-04 | End: 2022-11-10

## 2022-11-04 RX ORDER — ONDANSETRON 8 MG/1
4 TABLET, FILM COATED ORAL EVERY 8 HOURS
Refills: 0 | Status: DISCONTINUED | OUTPATIENT
Start: 2022-11-04 | End: 2022-11-10

## 2022-11-04 RX ORDER — VANCOMYCIN HCL 1 G
1000 VIAL (EA) INTRAVENOUS ONCE
Refills: 0 | Status: COMPLETED | OUTPATIENT
Start: 2022-11-04 | End: 2022-11-04

## 2022-11-04 RX ORDER — MIDODRINE HYDROCHLORIDE 2.5 MG/1
10 TABLET ORAL THREE TIMES A DAY
Refills: 0 | Status: DISCONTINUED | OUTPATIENT
Start: 2022-11-04 | End: 2022-11-10

## 2022-11-04 RX ADMIN — PIPERACILLIN AND TAZOBACTAM 200 GRAM(S): 4; .5 INJECTION, POWDER, LYOPHILIZED, FOR SOLUTION INTRAVENOUS at 13:51

## 2022-11-04 RX ADMIN — MIDODRINE HYDROCHLORIDE 10 MILLIGRAM(S): 2.5 TABLET ORAL at 22:01

## 2022-11-04 RX ADMIN — SODIUM CHLORIDE 2000 MILLILITER(S): 9 INJECTION INTRAMUSCULAR; INTRAVENOUS; SUBCUTANEOUS at 16:30

## 2022-11-04 RX ADMIN — Medication 1 MILLIGRAM(S): at 19:53

## 2022-11-04 RX ADMIN — Medication 1000 MILLIGRAM(S): at 16:00

## 2022-11-04 RX ADMIN — SODIUM CHLORIDE 2000 MILLILITER(S): 9 INJECTION INTRAMUSCULAR; INTRAVENOUS; SUBCUTANEOUS at 13:49

## 2022-11-04 RX ADMIN — SIMETHICONE 80 MILLIGRAM(S): 80 TABLET, CHEWABLE ORAL at 23:40

## 2022-11-04 RX ADMIN — Medication 1 MILLIGRAM(S): at 23:40

## 2022-11-04 RX ADMIN — Medication 250 MILLIGRAM(S): at 14:19

## 2022-11-04 RX ADMIN — PIPERACILLIN AND TAZOBACTAM 25 GRAM(S): 4; .5 INJECTION, POWDER, LYOPHILIZED, FOR SOLUTION INTRAVENOUS at 17:08

## 2022-11-04 RX ADMIN — SENNA PLUS 2 TABLET(S): 8.6 TABLET ORAL at 21:51

## 2022-11-04 RX ADMIN — SIMETHICONE 80 MILLIGRAM(S): 80 TABLET, CHEWABLE ORAL at 19:52

## 2022-11-04 RX ADMIN — PIPERACILLIN AND TAZOBACTAM 3.38 GRAM(S): 4; .5 INJECTION, POWDER, LYOPHILIZED, FOR SOLUTION INTRAVENOUS at 14:12

## 2022-11-04 NOTE — H&P ADULT - HISTORY OF PRESENT ILLNESS
76 year old female with PMHx of dementia, COPD with chronic respiratory failure s/p trach, cardiac arrest, CHH, quadriplegia, CVA, CKD, anemia, PEG tube, and multiple hospital admissions for respiratory distress [facility] complaining of fever, SOB, and dark sputum output from trach. Pt was discharged 2 days ago after being admitted for respiratory distress and pneumonia. Unable to obtain further history secondary to dementia.    WBC 10.9  U/A Moderate leuks    CXR There are persistent advanced bilateral infiltrates a small basilar   effusions right greater than left.  Lungs are rather unchanged compared to October 19 this year.

## 2022-11-04 NOTE — H&P ADULT - NSHPPHYSICALEXAM_GEN_ALL_CORE
GENERAL: NAD, Non Verbal  HEAD:  Atraumatic, Normocephalic  ENMT: Trach to Vent   NECK: Supple, No JVD, Normal thyroid  CHEST/LUNG: Clear to auscultation bilaterally; No rales, rhonchi, wheezing, or rubs  HEART: Regular rate and rhythm; No murmurs, rubs, or gallops  ABDOMEN: Soft, Nontender, PEG Tube +   EXTREMITIES:  2+ Peripheral Pulses, No clubbing, cyanosis, or edema

## 2022-11-04 NOTE — ED PROVIDER NOTE - SKIN, MLM
After Visit Summary   8/9/2017    Sonny Vivar    MRN: [id number]           Patient Information     Date Of Birth          1967        Visit Information        Provider Department      8/9/2017 2:00 PM Genna Balderas MD [facility]        Today's Diagnoses     Bronchitis    -  1    On supplemental oxygen therapy        Recurrent pulmonary embolism (H)        Pulmonary hypertension (H)        Hypoventilation associated with obesity (H)        Acquired bronchiectasis (H)          Care Instructions    Please take the levofloxacin and prednisone for 5 days.  Return this Friday for an INR check.  return to clinic in a week for follow-up with me (Dr Balderas).  Continue to use the nebulizer up to 4-6 hours as needed for wheezing and shortness of breath.  Continue the bumex at 3mg twice a day.   Please let us know if you need a new prescription for the nebulizer tubing.    Do not try to decrease the oxygen for now.               Follow-ups after your visit        Your next 10 appointments already scheduled     Aug 16, 2017  2:15 PM CDT   Anticoagulation Visit with AN ANTI COAG   [facility]    [address] [facility] [address] [phone]              Who to contact     If you have questions or need follow up information about today's clinic visit or your schedule please contact [facility] directly at [phone].  Normal or non-critical lab and imaging results will be communicated to you by MyChart, letter or phone within 4 business days after the clinic has received the results. If you do not hear from us within 7 days, please contact the clinic through MyChart or phone. If you have a critical or abnormal lab result, we will notify you by phone as soon as possible.  Submit refill requests through [employer] or call your pharmacy and they will forward the refill request to us. Please allow 3 business days for  your refill to be completed.          Additional Information About Your Visit        [employer] Information     [employer] gives you secure access to your electronic health record. If you see a primary care provider, you can also send messages to your care team and make appointments. If you have questions, please call your primary care clinic.  If you do not have a primary care provider, please call [phone] and they will assist you.        Care EveryWhere ID     This is your Care EveryWhere ID. This could be used by other organizations to access your [facility] medical records  [id number]        Your Vitals Were     Pulse Temperature Pulse Oximetry BMI (Body Mass Index)          83 94.8  F (34.9  C) (Oral) 88% 39.22 kg/m2         Blood Pressure from Last 3 Encounters:   08/09/17 91/62   08/02/17 97/59   08/02/17 97/59    Weight from Last 3 Encounters:   08/09/17 200 lb 12.8 oz (91.1 kg)   08/02/17 195 lb (88.5 kg)   08/02/17 195 lb (88.5 kg)              Today, you had the following     No orders found for display         Today's Medication Changes          These changes are accurate as of: 8/9/17  3:04 PM.  If you have any questions, ask your nurse or doctor.               Start taking these medicines.        Dose/Directions    levofloxacin 750 MG tablet   Commonly known as:  LEVAQUIN   Used for:  Bronchitis   Started by:  Genna Balderas MD        Dose:  750 mg   Take 1 tablet (750 mg) by mouth daily   Quantity:  5 tablet   Refills:  0       predniSONE 20 MG tablet   Commonly known as:  DELTASONE   Used for:  Bronchitis   Started by:  Genna Balderas MD        Dose:  40 mg   Take 2 tablets (40 mg) by mouth daily for 5 days   Quantity:  10 tablet   Refills:  0         These medicines have changed or have updated prescriptions.        Dose/Directions    bumetanide 2 MG tablet   Commonly known as:  BUMEX   This may have changed:  additional instructions   Used for:  Pulmonary hypertension (H)         Dose:  2 mg   Take 1 tablet (2 mg) by mouth 2 times daily Take 1.5 tablets (3 mg) by mouth 2 times daily   Quantity:  180 tablet   Refills:  1       * order for DME   This may have changed:  Another medication with the same name was added. Make sure you understand how and when to take each.   Used for:  Acute on chronic respiratory failure with hypoxia and hypercapnia (H)   Changed by:  Genna Balderas MD        Equipment being ordered:  Home Oxygen (same home and transportable oxygen tanks and equipment which the patient currently has, as of: February 26, 2017). 3 liters per minute of oxygen at rest and on ambulaton via nasal canula. Continuous. Need for 99 months.  Please fax this order, along with Dr Balderas' last OV note from 2.22.17, to: [facility]: fax: [phone].   Quantity:  1 Device   Refills:  0       * order for DME   This may have changed:  Another medication with the same name was added. Make sure you understand how and when to take each.   Used for:  Lymphedema   Changed by:  Genna Balderas MD        Equipment being ordered:1 pair (2 units) of knee high compression stockings, 10-15mm Hg, to be worn in the daytime only on both legs, closed-toe.   Quantity:  2 Units   Refills:  0       * order for DME   This may have changed:  You were already taking a medication with the same name, and this prescription was added. Make sure you understand how and when to take each.   Used for:  Acquired bronchiectasis (H)   Changed by:  Genna Balderas MD        Tubing for nebulizer   Quantity:  1 Units   Refills:  0       * Notice:  This list has 3 medication(s) that are the same as other medications prescribed for you. Read the directions carefully, and ask your doctor or other care provider to review them with you.         Where to get your medicines      These medications were sent to [facility] [id number] [place] [address] [address]  [facility] AT [address]  [address] [facility], [facility] [address]     Phone:  [phone]     levofloxacin 750 MG tablet    predniSONE 20 MG tablet         Some of these will need a paper prescription and others can be bought over the counter.  Ask your nurse if you have questions.     Bring a paper prescription for each of these medications     order for DME                Primary Care Provider Office Phone # Fax #    Genna Balderas -[phone] [address] [facility] [address]        Equal Access to Services     : Hadii pablo xiong hadasho Sodiane, waaxda luqadaha, qaybta kaalmada adeliayalaura, radha swann . [facility] [phone].    ATENCIÓN: Si habla español, tiene a valentine disposición servicios gratuitos de asistencia lingüística. [facility] [phone].    We comply with applicable federal civil rights laws and [place] laws. We do not discriminate on the basis of race, color, national origin, age, disability sex, sexual orientation or gender identity.            Thank you!     Thank you for choosing [facility]  for your care. Our goal is always to provide you with excellent care. Hearing back from our patients is one way we can continue to improve our services. Please take a few minutes to complete the written survey that you may receive in the mail after your visit with us. Thank you!             Your Updated Medication List - Protect others around you: Learn how to safely use, store and throw away your medicines at www.disposemymeds.org.          This list is accurate as of: 8/9/17  3:04 PM.  Always use your most recent med list.                   Brand Name Dispense Instructions for use Diagnosis    ACE/ARB NOT PRESCRIBED (INTENTIONAL)      Please choose reason not prescribed, below    Acute on chronic systolic heart failure (H)       acetaminophen 325 MG tablet    TYLENOL     Take 325-650 mg by  mouth every 6 hours as needed for mild pain        acetaZOLAMIDE 250 MG tablet    DIAMOX    90 tablet    Take 1 tablet (250 mg) by mouth daily    Pulmonary hypertension (H)       allopurinol 300 MG tablet    ZYLOPRIM    7 tablet    Take 1 tablet (300 mg) by mouth daily    Idiopathic gout, unspecified chronicity, unspecified site       amoxicillin 500 MG capsule    AMOXIL    42 capsule    TAKE ONE CAPSULE BY MOUTH THREE TIMES DAILY. 2 WEEKS ON, THEN 2 WEEKS OFF    Acquired bronchiectasis (H), Hypoventilation associated with obesity (H)       BETA BLOCKER NOT PRESCRIBED (INTENTIONAL)      Beta Blocker not prescribed intentionally due to Evidence of fluid overload or volume depletion    Hypertension, benign       bumetanide 2 MG tablet    BUMEX    180 tablet    Take 1 tablet (2 mg) by mouth 2 times daily Take 1.5 tablets (3 mg) by mouth 2 times daily    Pulmonary hypertension (H)       ipratropium - albuterol 0.5 mg/2.5 mg/3 mL 0.5-2.5 (3) MG/3ML neb solution    DUONEB    360 mL    INHALE 1 VIAL BY NEBULIZATION EVERY 6 HOURS AS NEEDED. USE FOR INCREASED COUGH AND/OR CONGESTION    Acquired bronchiectasis (H), Pulmonary hypertension (H)       levofloxacin 750 MG tablet    LEVAQUIN    5 tablet    Take 1 tablet (750 mg) by mouth daily    Bronchitis       Multi-vitamin Tabs tablet      Take 2 tablets by mouth daily        nebulizer/adult mask Kit     1 kit    1 Units 4 times daily.    On supplemental oxygen therapy, Recurrent pulmonary embolism (H), Pulmonary hypertension (H)       omeprazole 40 MG capsule    priLOSEC    90 capsule    TAKE ONE CAPSULE BY MOUTH DAILY. TAKE 30-60 MINUTES BEFORE A MEAL    Gastroesophageal reflux disease, esophagitis presence not specified       * order for DME     1 Device    Dispense one handheld pulse oximeter for home use.  Check oxygen saturation daily.  Notify primary MD clinic if oxygen regularly <90% or <88% at any time, and increase supplemental oyxgen.    Recurrent pulmonary embolism  (H), Bronchiectasis (H), Pulmonary hypertension (H)       * order for DME     1 Units    Equipment being ordered: Tubing for nebulizer and accessories.    Pulmonary hypertension (H), On supplemental oxygen therapy, Hypoventilation associated with obesity (H), Acquired bronchiectasis (H)       * order for DME      3 L continuous.    Need for prophylactic vaccination with tetanus-diphtheria (TD), Thiazide diuretics causing adverse effect in therapeutic use       * order for DME     1 Device    Equipment being ordered:  Home Oxygen (same home and transportable oxygen tanks and equipment which the patient currently has, as of: February 26, 2017). 3 liters per minute of oxygen at rest and on ambulaton via nasal canula. Continuous. Need for 99 months.  Please fax this order, along with Dr Balderas' last OV note from 2.22.17, to: [facility]: fax: [phone].    Acute on chronic respiratory failure with hypoxia and hypercapnia (H)       * order for DME     2 Units    Equipment being ordered:1 pair (2 units) of knee high compression stockings, 10-15mm Hg, to be worn in the daytime only on both legs, closed-toe.    Lymphedema       * order for DME     1 Units    Tubing for nebulizer    Acquired bronchiectasis (H)       potassium chloride 20 MEQ Packet    KLOR-CON    180 tablet    Take 20 mEq by mouth 2 times daily dispense tablets not packets.    Pulmonary hypertension (H)       predniSONE 20 MG tablet    DELTASONE    10 tablet    Take 2 tablets (40 mg) by mouth daily for 5 days    Bronchitis       sildenafil 20 MG tablet    REVATIO/VIAGRA    180 tablet    Take 1 tablet (20 mg) by mouth 3 times daily 2/22/2017 patient takes one tablet 3 times daily    Pulmonary hypertension (H)       simvastatin 20 MG tablet    ZOCOR    39 tablet    TAKE 1 TABLET(20 MG) BY MOUTH 3 TIMES A WEEK    Hyperlipidemia LDL goal <130       warfarin 3 MG tablet    COUMADIN    150 tablet    Take 3 mg valentine,tu,th and 1.5 mg  m,w,f,sa    Long term (current) use of anticoagulants, Pulmonary embolism and infarction (H)       * Notice:  This list has 6 medication(s) that are the same as other medications prescribed for you. Read the directions carefully, and ask your doctor or other care provider to review them with you.       Decubitus ulcers on knees and feet

## 2022-11-04 NOTE — CONSULT NOTE ADULT - ASSESSMENT
76 year old female with PMHx of dementia, COPD with chronic respiratory failure s/p trach, cardiac arrest, CHH, quadriplegia, CVA, CKD, anemia, PEG tube, and multiple hospital admissions for respiratory distress presents to the ED [facility] from [facility] complaining of fever, SOB, and dark sputum output from trach.  Admitted with sepsis 2/2 VAP v UTI.    Sepsis   VAP  UTI  Chronic respiratory failure    - Nonverbal, tracks eyes.  Standing lorazepam with PRN lorazepam for agitation  - Hemodynamically stable; continue with midodrine  - Continuous telemetry monitoring and vitals per protocol  - VAP, on levofloxacin and vancomycin  - lung protective ventilation; vent setting stable; actively titrating to maintain Sp02 > 92%  - Aggressive chest PT and suctioning  - Pending ABG in AM  - PEG tube in place; protonix for SUP  - Bowel regimen in place  - Monitor lytes and replete PRN  - I&Os per protocol  - Known CRE Pseudomonas.  Prior sputum cx with  Stenotrophomonas maltophilia sensitive to levofloxacin and e. coli UTI sensitive to ceftriaxone  - Continue with broad spectum abx and tailor as results from pan cx return  - FS q6h  - Full code

## 2022-11-04 NOTE — CONSULT NOTE ADULT - SUBJECTIVE AND OBJECTIVE BOX
BREA BECKHAM    SY EDIP 10    Allergies    codeine (Hives)    Intolerances        PAST MEDICAL & SURGICAL HISTORY:  Dementia of frontal lobe type      Aphasic stroke      Diabetes mellitus      Respiratory failure      Hypertension      GERD (gastroesophageal reflux disease)      Constipation      Respiratory failure      CVA (cerebral vascular accident)      HTN (hypertension)      DM (diabetes mellitus)      Advanced dementia      COVID-19 virus detected      Quadriplegia      Pneumonia      Type II diabetes mellitus      Hx of appendectomy      Gastrostomy in place      Tracheostomy in place      Tracheostomy tube present      Feeding by G-tube          FAMILY HISTORY:  No pertinent family history in first degree relatives        Home Medications:  albuterol 90 mcg/inh inhalation aerosol with adapter: 2  inhaled every 6 hours (18 Oct 2022 11:42)  Bacid (LAC) oral tablet: 2 tab(s) by gastrostomy tube once a day (18 Oct 2022 11:42)  Betadine 10% topical swab: cleanse right and left great toes (18 Oct 2022 11:42)  chlorhexidine 0.12% mucous membrane liquid: 15 milliliter(s) mucous membrane 2 times a day (18 Oct 2022 11:42)  Eucerin topical cream: Apply topically to affected area once a day bilateral feet (18 Oct 2022 11:42)  insulin glargine 100 units/mL subcutaneous solution: 8 unit(s) subcutaneous once a day (in the morning) (18 Oct 2022 11:42)  ipratropium-albuterol 0.5 mg-2.5 mg/3 mL inhalation solution: 3 milliliter(s) inhaled 4 times a day (18 Oct 2022 11:42)  LORazepam 1 mg oral tablet: 1 tab(s) by gastrostomy tube every 4 hours (18 Oct 2022 11:42)  midodrine 10 mg oral tablet: 1 tab(s) orally every 8 hours (2022 11:44)  MiraLax oral powder for reconstitution: g-tube (18 Oct 2022 13:04)  Multiple Vitamins oral tablet: 1 tab(s) orally once a day (18 Oct 2022 11:42)  nystatin 100,000 units/g topical powder: 1 application topically 3 times a day (18 Oct 2022 11:42)  omeprazole 20 mg oral delayed release capsule: orally 2 times a day (18 Oct 2022 11:42)  polyethylene glycol 3350 oral powder for reconstitution: 17 gram(s) by gastrostomy tube every 12 hours (18 Oct 2022 11:42)  senna 8.6 mg oral tablet: 3 tab(s) by gastrostomy tube once a day (at bedtime) (18 Oct 2022 11:42)  simethicone 80 mg oral tablet, chewable: 1 tab(s) by gastrostomy tube every 6 hours (18 Oct 2022 11:42)  sucralfate 1 g/10 mL oral suspension: 10 milliliter(s) g-tube 4 times a day (before meals and at bedtime) (18 Oct 2022 13:04)  Tylenol 325 mg oral tablet: 2 tab(s) by gastrostomy tube once a day; 60 minutes prior to dressing change  (18 Oct 2022 11:42)  Tylenol 325 mg oral tablet: 2 tab(s) by gastrostomy tube every 6 hours, As Needed (18 Oct 2022 11:42)      MEDICATIONS  (STANDING):  chlorhexidine 0.12% Liquid 15 milliLiter(s) Oral Mucosa every 12 hours  lactobacillus acidophilus 1 Tablet(s) Oral daily  levoFLOXacin IVPB 750 milliGRAM(s) IV Intermittent every 24 hours  LORazepam     Tablet 1 milliGRAM(s) Oral four times a day  midodrine. 10 milliGRAM(s) Oral three times a day  pantoprazole    Tablet 40 milliGRAM(s) Oral before breakfast  polyethylene glycol 3350 17 Gram(s) Oral daily  senna 2 Tablet(s) Oral at bedtime  simethicone 80 milliGRAM(s) Chew every 6 hours  vancomycin  IVPB        MEDICATIONS  (PRN):  acetaminophen     Tablet .. 650 milliGRAM(s) Oral every 6 hours PRN Temp greater or equal to 38C (100.4F), Mild Pain (1 - 3)  ondansetron Injectable 4 milliGRAM(s) IV Push every 8 hours PRN Nausea and/or Vomiting      Diet, NPO (22 @ 17:56) [Active]          Vital Signs Last 24 Hrs  T(C): 37.1 (2022 19:49), Max: 37.4 (2022 12:44)  T(F): 98.7 (2022 19:49), Max: 99.4 (2022 12:44)  HR: 89 (2022 19:49) (85 - 93)  BP: 120/68 (2022 19:49) (107/55 - 120/68)  BP(mean): --  RR: 22 (2022 16:31) (16 - 22)  SpO2: 94% (2022 19:49) (93% - 99%)    Parameters below as of 2022 16:31  Patient On (Oxygen Delivery Method): conventional ventilator            Mode: AC/ CMV (Assist Control/ Continuous Mandatory Ventilation), RR (machine): 16, TV (machine): 400, FiO2: 50, PEEP: 5, ITime: 1, MAP: 16, PIP: 40      LABS:                        10.5   10.99 )-----------( 184      ( 2022 13:48 )             35.3         138  |  102  |  25<H>  ----------------------------<  186<H>  4.9   |  25  |  0.94    Ca    8.5      2022 13:48    TPro  7.7  /  Alb  1.9<L>  /  TBili  0.5  /  DBili  x   /  AST  18  /  ALT  12  /  AlkPhos  159<H>  11    PT/INR - ( 2022 13:48 )   PT: 14.3 sec;   INR: 1.21 ratio         PTT - ( 2022 13:48 )  PTT:41.1 sec  Urinalysis Basic - ( 2022 13:48 )    Color: Yellow / Appearance: Clear / S.015 / pH: x  Gluc: x / Ketone: Negative  / Bili: Negative / Urobili: Negative mg/dL   Blood: x / Protein: 100 mg/dL / Nitrite: Negative   Leuk Esterase: Moderate / RBC: 0-2 /HPF / WBC 11-25   Sq Epi: x / Non Sq Epi: Occasional / Bacteria: Moderate            WBC:  WBC Count: 10.99 K/uL ( @ 13:48)  WBC Count: 8.30 K/uL ( @ 06:18)  WBC Count: 11.33 K/uL ( @ 07:12)      MICROBIOLOGY:  RECENT CULTURES:              PT/INR - ( 2022 13:48 )   PT: 14.3 sec;   INR: 1.21 ratio         PTT - ( 2022 13:48 )  PTT:41.1 sec    Sodium:  Sodium, Serum: 138 mmol/L (:48)  Sodium, Serum: 137 mmol/L ( @ 06:18)  Sodium, Serum: 134 mmol/L ( @ 07:12)      0.94 mg/dL :48  0.80 mg/dL  06:18  0.96 mg/dL  @ :12      Hemoglobin:  Hemoglobin: 10.5 g/dL (:48)  Hemoglobin: 9.2 g/dL ( @ 06:18)  Hemoglobin: 9.1 g/dL ( @ 07:12)      Platelets: Platelet Count - Automated: 184 K/uL (:48)  Platelet Count - Automated: 167 K/uL ( @ 06:18)  Platelet Count - Automated: 169 K/uL ( 07:12)      LIVER FUNCTIONS - ( 2022 13:48 )  Alb: 1.9 g/dL / Pro: 7.7 g/dL / ALK PHOS: 159 U/L / ALT: 12 U/L DA / AST: 18 U/L / GGT: x             Urinalysis Basic - ( 2022 13:48 )    Color: Yellow / Appearance: Clear / S.015 / pH: x  Gluc: x / Ketone: Negative  / Bili: Negative / Urobili: Negative mg/dL   Blood: x / Protein: 100 mg/dL / Nitrite: Negative   Leuk Esterase: Moderate / RBC: 0-2 /HPF / WBC 11-25   Sq Epi: x / Non Sq Epi: Occasional / Bacteria: Moderate        RADIOLOGY & ADDITIONAL STUDIES:      MICROBIOLOGY:  RECENT CULTURES:

## 2022-11-04 NOTE — H&P ADULT - ASSESSMENT
76 year old female with PMHx of dementia, COPD with chronic respiratory failure s/p trach, cardiac arrest, CHH, quadriplegia, CVA, CKD, anemia, PEG tube, and multiple hospital admissions for respiratory distress a/w sepsis and acute on chronic hypoxic respiratory failure due to suspected recurrent VAP.    Severe sepsis and acute hypoxic respiratory failure 2/2 gram-negative PNA   SPCU monitoring  - ID (Karon/Brendon mccoy), recs appreciated   Now on levaquin and vancomycin per ID recs  - c/w midodrine with hold parameters -- consider titrating dose if BP elevated  - cc/pulm consult (Jaime), recs appreciated  - palliative care (Emre), recs appreciated - pt is full code  f/u cultures    Diastolic CHF  Hx of Pleural effusions  - last TTE was 5/30 with EF 65% with normal LVSF, dilated RV, and moderate pHTN -> repeat TTE appears unchanged  - may need repeat thoracentesis (had 1.5L drained few admissions ago), pulmonology consulted; pl effusion was parapneumonic on past admission as opposed to transudative and thus less likely related to CHF    Anemia of chronic disease  - has been evaluated by GI and hematology in the past -> dx with anemia of chronic disease with intermittent GI bleeding  - no jacqueline GI bleed per nursing     hx of HTN  - not on any anti-hypertensive meds at facility  - monitor VS    DM2  - NPO with TF  - c/w moderate dose lispro coverage sliding scale q6h  - goal -180 - now at goal - c/w lantus 5un sq QAM

## 2022-11-04 NOTE — PATIENT PROFILE ADULT - FALL HARM RISK - HARM RISK INTERVENTIONS
Assistance with ambulation/Assistance OOB with selected safe patient handling equipment/Communicate Risk of Fall with Harm to all staff/Discuss with provider need for PT consult/Monitor gait and stability/Reinforce activity limits and safety measures with patient and family/Tailored Fall Risk Interventions/Visual Cue: Yellow wristband and red socks/Bed in lowest position, wheels locked, appropriate side rails in place/Call bell, personal items and telephone in reach/Instruct patient to call for assistance before getting out of bed or chair/Non-slip footwear when patient is out of bed/[place] to call system/Physically safe environment - no spills, clutter or unnecessary equipment/Purposeful Proactive Rounding/Room/bathroom lighting operational, light cord in reach

## 2022-11-04 NOTE — ED PROVIDER NOTE - OBJECTIVE STATEMENT
76 year old female with PMHx of dementia, COPD with chronic respiratory failure s/p trach, cardiac arrest, CHH, quadriplegia, CVA, CKD, anemia, PEG tube, and multiple hospital admissions for respiratory distress presents to the ED BIBEMS from [facility] complaining of fever, SOB, and dark sputum output from trach. Pt was discharged 2 days ago after being admitted for respiratory distress and pneumonia. Unable to obtain further history secondary to dementia.

## 2022-11-04 NOTE — ED PROCEDURE NOTE - CPROC ED ARTER LINE DETAIL1
Using sterile technique, the correct location was identified, and a needle was inserted into the artery (specify in FT)./Positive blood return was obtained via the catheter.

## 2022-11-04 NOTE — ED PROVIDER NOTE - CLINICAL SUMMARY MEDICAL DECISION MAKING FREE TEXT BOX
Nursing home pt with trach. Pt recently discharged with pneumonia, now with fever and respiratory distress. Plan: sepsis workup, IV fluids, EKG, labs, chest x-ray, and likely admission.

## 2022-11-04 NOTE — ED ADULT NURSE NOTE - NURSING ED PRESSURE ULCER APPEARANCE 8
Patient has been assessed for Home Oxygen needs. Oxygen readings:    *Pulse oximetry (SpO2) = 93% on room air at rest while awake.    *SpO2 improved to 96% on 2liters/minute at rest.    *SpO2 = 90% on room air during activity/with exercise.    *SpO2 improved to 95% on 2liters/minute during activity/with exercise.         black/eschar

## 2022-11-04 NOTE — ED ADULT NURSE NOTE - OBJECTIVE STATEMENT
76 year old female with multiple hospital admissions for respiratory distress presents to the ED BIB EMS from [facility] complaining of fever, SOB, and dark sputum output from trach. Pt was discharged 2 days ago after being admitted for respiratory distress and pneumonia. Unable to obtain further history secondary to dementia.

## 2022-11-04 NOTE — ED PROVIDER NOTE - ENMT, MLM
Airway patent, Nasal mucosa clear. Mouth with normal mucosa. Throat has no vesicles, no oropharyngeal exudates and uvula is  midline. Tracheostomy in anterior neck.

## 2022-11-04 NOTE — CONSULT NOTE ADULT - ASSESSMENT
The patient is a 76 year old female with a history of HTN, DM, CVA, dementia, chronic respiratory failure s/p trach, PEG, cardiac arrest, anemia, pleural effusions who presents with fever and respiratory distress.    Plan:  - ECG with no evidence of ischemia or infarction  - Echo 8/22 with normal LV systolic function, mod pulm HTN, IVC small/collapsible  - CT chest with small bilateral pleural effusions, GGO, and infiltrates  - Pleural effusions previously were exudative, likely parapneumonic  - Continue midodrine 10 mg tid  - IV antibiotics

## 2022-11-04 NOTE — CONSULT NOTE ADULT - SUBJECTIVE AND OBJECTIVE BOX
Kylie, Division of Infectious Diseases  [facility], [facility]. [facility], [facility]  [phone]    BREA BECKHAM  76y, Female  [id number]    HPI--  HPI: 76 year old female with PMHx of dementia, COPD with chronic respiratory failure s/p trach, cardiac arrest, CHH, quadriplegia, CVA, CKD, anemia, PEG tube, and multiple hospital admissions for respiratory distress presents to the ED BIBEMS from [facility] complaining of fever, SOB, and dark sputum output from trach. Pt was discharged 2 days ago after being admitted for respiratory distress and pneumonia. Unable to obtain further history secondary to dementia.    Pt seen in ED  C/f Recurrent VAP    Active Medications--  chlorhexidine 0.12% Liquid 15 milliLiter(s) Oral Mucosa every 12 hours  piperacillin/tazobactam IVPB.. 3.375 Gram(s) IV Intermittent once  vancomycin  IVPB      vancomycin  IVPB 1000 milliGRAM(s) IV Intermittent once    Antimicrobials:   piperacillin/tazobactam IVPB.. 3.375 Gram(s) IV Intermittent once  vancomycin  IVPB      vancomycin  IVPB 1000 milliGRAM(s) IV Intermittent once    Immunologic:     ROS:  unable to obtain    Allergies: codeine (Hives)    PMH -- Dementia of frontal lobe type    Aphasic stroke    Diabetes mellitus    Respiratory failure    Hypertension    GERD (gastroesophageal reflux disease)    Constipation    Respiratory failure    CVA (cerebral vascular accident)    HTN (hypertension)    DM (diabetes mellitus)    Advanced dementia    COVID-19 virus detected    Quadriplegia    Pneumonia    Type II diabetes mellitus      PSH -- Hx of appendectomy    Gastrostomy in place    Tracheostomy in place    Tracheostomy tube present    Feeding by G-tube      FH -- No pertinent family history in first degree relatives    No pertinent family history in first degree relatives    No pertinent family history in first degree relatives    No pertinent family history in first degree relatives      Social History --  EtOH: denies   Tobacco: denies   Drug Use: denies     Travel/Environmental/Occupational History:    Physical Exam--  Vital Signs Last 24 Hrs  T(F): 99.4 (2022 12:44), Max: 99.4 (2022 12:44)  HR: 93 (2022 12:44) (93 - 93)  BP: 107/55 (2022 12:44) (107/55 - 107/55)  RR: 16 (2022 12:44) (16 - 16)  SpO2: 98% (2022 12:51) (93% - 98%)  General: chronically ill appearing   Neck: trach  HEENT: NC/AT  Lungs: MV breath sounds  Heart: Regular rate and rhythm. No murmur, rub or gallop.  Abdomen: Soft. Nondistended. Nontender. PGT in place  Skin: Warm. Dry. Good turgor.      Laboratory & Imaging Data:  CBC:                       10.5   10.99 )-----------( 184      ( 2022 13:48 )             35.3     CMP:         Urinalysis Basic - ( 2022 13:48 )    Color: Yellow / Appearance: Clear / S.015 / pH: x  Gluc: x / Ketone: Negative  / Bili: Negative / Urobili: Negative mg/dL   Blood: x / Protein: 100 mg/dL / Nitrite: Negative   Leuk Esterase: Moderate / RBC: x / WBC x   Sq Epi: x / Non Sq Epi: x / Bacteria: x        Microbiology: reviewed        Radiology: reviewed  < from: Xray Chest 1 View- PORTABLE-Urgent (22 @ 13:51) >    ACC: [id number] EXAM:  XR CHEST PORTABLE URGENT 1V                          PROCEDURE DATE:  2022          INTERPRETATION:  AP supine chest on 2022 at 1:36 PM. Patient   has sepsis.    Heart size normal. Tracheostomy again noted. Right jugular line on    is been removed.    There are persistent advanced bilateral infiltrates a small basilar   effusions right greater than left.    Lungs are rather unchanged compared to  this year.    IMPRESSION: As above.    --- End of Report ---            HANSEL ANDRADE MD; Attending Radiologist  This document has been electronically signed. 2022  1:54PM    < end of copied text >

## 2022-11-04 NOTE — CONSULT NOTE ADULT - ASSESSMENT
76 year old female with PMHx of dementia, COPD with chronic respiratory failure s/p trach, cardiac arrest, CHH, quadriplegia, CVA, CKD, anemia, PEG tube, and multiple hospital admissions for respiratory distress presents to the ED BIBEMS from [facility] complaining of fever,    sepsis  chr resp failure  dementia  copd  atelectasis  dysphagia  peg  trach  cva  ckd  anemia    GOC documented  pt is full code   Marciano - HCP

## 2022-11-04 NOTE — ED ADULT TRIAGE NOTE - CHIEF COMPLAINT QUOTE
sent from [facility] for reported fever, dark sputum output from trach   recently d/c  trach/vent

## 2022-11-04 NOTE — ED ADULT NURSE NOTE - CHIEF COMPLAINT QUOTE
sent from [facility] for reported fever, dark sputum output from trach   recently d/c  trach/vent

## 2022-11-04 NOTE — CONSULT NOTE ADULT - ASSESSMENT
Initial evaluation/Pulmonary Critical Care consultation requested on 11/4/2022  by Dr DOWNS    from Dr Sandoval   Patient examined chart reviewed    HOSPITAL ADMISSION   PATIENT CAME  FROM (if information available)      REVIEW OF SYMPTOMS      Able to give (reliable) ROS  NO     PHYSICAL EXAM    HEENT Unremarkable  atraumatic   RESP Fair air entry EXP prolonged    Harsh breath sound Resp distres mild   CARDIAC S1 S2 No S3     NO JVD    ABDOMEN SOFT BS PRESENT NOT DISTENDED No hepatosplenomegaly   PEDAL EDEMA present No calf tenderness  NO rash       PATIENT PRESENTATION.  76 f PMH trach peg cva cac anoxic encephalo PH 8/19/2022 pasp 58 bl pl effs  r thora 6/8/2022 and l thora 5/25/2022 were lp exudates  recurrent hospitalizations HO CR psuedomonas 5/2/2022 zosyn 8/19/2022  recent admission 10/18-11/2/2022 uc 10/18 100K E coli  sp 10/19 Stenotrophomonas  10/19-10/26/2022 levaquin 750  10/21/2022 rocephin x 7d Dr BRITTANY Brantley sent back from [facility] 11/4/2022 with fever   Pulm crit care consulted 11/4/2022                                                               AGE/SEX.   76 f  DOA.  11/4/2022  CC .  11/4/2022 fever         PAST ID ISSUES   uc 10/18 100K E coli   sp 10/19 Stenotrophomonas   8/19/2022  zosyn Se Vignesh  8/19/2022 bactrim ds 2 bid   pmh 5/2/2022 SERRATIA CARBAPENEM RESISTANT PSEUDOMONAS     PMH .  pmh TRANSFUSION  10/19/2022 2 U PRBC   10/31/2022 1 u porbc   pmh Trach  pmh peg  pmh NO idf  pmh Pneumonia    pmh HTN,   pmh DM,   pmh CVA,   pmh  chronic respiratory failure   pmh s/p trach, PEG,   pmh cardiac arrest  pmh pulm hytn echo 8/19/2022 n lvsf dd1 pasp 58    pmh Pl effsn  r PL EFFSN   6/8/2022 r thorac g 161 l 222 p 4.9 p 10 l 16 .38    l pl effsn  5/25/2022 g 183 l 144/381 .37 p 3.4/5.3 .64 p 23 l 36   5/25/2022l pl fluid  lymph pred exudate   cyto (-)             GENERAL DATA .   GOC.        ALLGY. codeine                           WT.   BMI.                              ICU STAY. 11/4/2022  COVID.      BEST PRACTICE ISSUES.    HOB ELEVATN. Yes  DVT PPLX.  11/4/2022 hpsc    SQUIRES PPLX.  11/4/2022 protonix 40     INFN PPLX.    SP SW EM.        DIET. 11/4/2022 npo       VS/ PO/IO/ VENT/ DRIPS.   11/4/2022 afeb 85 120/60   11/4/2022   ac 400/16/50/5       ASSESSMENT/RECOMMENDATIONS .   RESP.  -- Gas exchange.  Monitor & target po 90-95%  -- VENT MANAGEMENT.   HOB elevation  Target Pplat 30 (-)  Target PO 90-95%  Target pH 730 (+)  Daily spontaneous breathing trials   Daily sedation vacation   -- Pleural effsn  Past ritchie   R THORAC   6/8/2022  g 161 l 222 p 4.9 p 10 l 16 .38    L THORAC   5/25/2022 g 183 l 144/381 .37 p 3.4/5.3 .64 p 23 l 36   5/25/2022l pl fluid  lymph pred exudate   cyto (-)     Ct ch 10/18/2022 sm r mod l effs large subpulmonic component   a/r No thoracentesis plannd at this point risk prohibitive in vent pt   -- RO VTE  11/4/2022 check venous duplx legs   INFECTION.  -- SCV2 status.  Scv2 11/4/2022 scv2 (-)   -- VAP.  RECENT ID HISTORY  uc 10/18 100K E coli  sp 10/19 Stenotrophomonas  10/19-10/26/2022 levaquin 750  10/21/2022 rocephin x 7d Dr BRITTANY Brantley  w 11/4/2022 w 10.9   ua 11/4/2022 w 11-25   cxr 11/4/2022 persistent advanced bl infiltr cw 11/4/2022 small basl effesns r greater than l   11/4/2022 levaquin 750  11/4/2022 vanco 1.2   check rvp sputum cs   CARDIAC.  -- Hemodynamics.   11/4/2022 midodrine 10.3  target map 65 (+)   GI.  -- Nutrition.  11/4/2022 tf  -- constipation.  11/4/2022 senna   11/4/2022 miralax   HEMAT.  -- DVT pplx.   11/4/2022 hpsc   -- anemia.  Hb 11/4/2022 Hb 10.5   mcv 11/4/2022 mcv 93   monitor   RENAL.  -- renal parameters   Na 11/4/2022 Na 139  co2 11/4/2022 co2 25   cr 11/4/2022 cr .9   IV fl.  ENDOCRINE.   NEURO.  -- seizures.  11/4/2022 lorazepam 1.4     OVERALL ASSESSMENT/DISPOSITION.  76 f PMH trach peg cva cac anoxic encephalo PH 8/19/2022 pasp 58 bl pl effs  r thora 6/8/2022 and l thora 5/25/2022 were lp exudates  recurrent hospitalizations HO CR psuedomonas 5/2/2022 zosyn 8/19/2022  recent admission 10/18-11/2/2022 uc 10/18 100K E coli  sp 10/19 Stenotrophomonas  10/19-10/26/2022 levaquin 750  10/21/2022 rocephin x 7d Dr BRITTANY Brantley sent back from [facility] 11/4/2022 with fever   Pulm crit care consulted 11/4/2022    VAP   -- 11/4/2022 levaquin   -- 11/4/2022 vanco       TIME SPENT   Over 55 minutes aggregate critical care time spent on encounter; activities included   direct patient care, counseling and/or coordinating care reviewing notes, lab data/ imaging , discussion with multidisciplinary team/ patient  /family and explaining in detail risks, benefits, alternatives  of the recommendations     CHAPINCITO GUIDRY 76 f [facility] S 11/4/2022   DR HIMANSHU RANDHAWA

## 2022-11-04 NOTE — ED ADULT NURSE NOTE - NSIMPLEMENTINTERV_GEN_ALL_ED
Implemented All Fall Risk Interventions:  [place] to call system. Call bell, personal items and telephone within reach. Instruct patient to call for assistance. Room bathroom lighting operational. Non-slip footwear when patient is off stretcher. Physically safe environment: no spills, clutter or unnecessary equipment. Stretcher in lowest position, wheels locked, appropriate side rails in place. Provide visual cue, wrist band, yellow gown, etc. Monitor gait and stability. Monitor for mental status changes and reorient to person, place, and time. Review medications for side effects contributing to fall risk. Reinforce activity limits and safety measures with patient and family.

## 2022-11-04 NOTE — CONSULT NOTE ADULT - SUBJECTIVE AND OBJECTIVE BOX
History of Present Illness: The patient is a 76 year old female with a history of HTN, DM, CVA, dementia, chronic respiratory failure s/p trach, PEG, cardiac arrest, anemia, pleural effusions who presents with fever and respiratory distress. The patient is unable to provide additional history. She has had multiple recent admissions for acute on chronic respiratory failure.      Past Medical/Surgical History:  HTN, DM, CVA, dementia, chronic respiratory failure s/p trach, PEG, cardiac arrest, anemia, pleural effusions     Medications:  Home Medications:  albuterol 90 mcg/inh inhalation aerosol with adapter: 2  inhaled every 6 hours (18 Oct 2022 11:42)  Bacid (LAC) oral tablet: 2 tab(s) by gastrostomy tube once a day (18 Oct 2022 11:42)  Betadine 10% topical swab: cleanse right and left great toes (18 Oct 2022 11:42)  chlorhexidine 0.12% mucous membrane liquid: 15 milliliter(s) mucous membrane 2 times a day (18 Oct 2022 11:42)  Eucerin topical cream: Apply topically to affected area once a day bilateral feet (18 Oct 2022 11:42)  insulin glargine 100 units/mL subcutaneous solution: 8 unit(s) subcutaneous once a day (in the morning) (18 Oct 2022 11:42)  ipratropium-albuterol 0.5 mg-2.5 mg/3 mL inhalation solution: 3 milliliter(s) inhaled 4 times a day (18 Oct 2022 11:42)  LORazepam 1 mg oral tablet: 1 tab(s) by gastrostomy tube every 4 hours (18 Oct 2022 11:42)  midodrine 10 mg oral tablet: 1 tab(s) orally every 8 hours (02 Nov 2022 11:44)  MiraLax oral powder for reconstitution: g-tube (18 Oct 2022 13:04)  Multiple Vitamins oral tablet: 1 tab(s) orally once a day (18 Oct 2022 11:42)  nystatin 100,000 units/g topical powder: 1 application topically 3 times a day (18 Oct 2022 11:42)  omeprazole 20 mg oral delayed release capsule: orally 2 times a day (18 Oct 2022 11:42)  polyethylene glycol 3350 oral powder for reconstitution: 17 gram(s) by gastrostomy tube every 12 hours (18 Oct 2022 11:42)  senna 8.6 mg oral tablet: 3 tab(s) by gastrostomy tube once a day (at bedtime) (18 Oct 2022 11:42)  simethicone 80 mg oral tablet, chewable: 1 tab(s) by gastrostomy tube every 6 hours (18 Oct 2022 11:42)  sucralfate 1 g/10 mL oral suspension: 10 milliliter(s) g-tube 4 times a day (before meals and at bedtime) (18 Oct 2022 13:04)  Tylenol 325 mg oral tablet: 2 tab(s) by gastrostomy tube once a day; 60 minutes prior to dressing change  (18 Oct 2022 11:42)  Tylenol 325 mg oral tablet: 2 tab(s) by gastrostomy tube every 6 hours, As Needed (18 Oct 2022 11:42)      Family History: Non-contributory family history of premature cardiovascular atherosclerotic disease    Social History: No tobacco, alcohol or drug use    Review of Systems:  General: No fevers, chills, weight gain  Skin: No rashes, color changes  Cardiovascular: No chest pain, orthopnea  Respiratory: No shortness of breath, cough  Gastrointestinal: No nausea, abdominal pain  Genitourinary: No incontinence, pain with urination  Musculoskeletal: No pain, swelling, decreased range of motion  Neurological: No headache, weakness  Psychiatric: No depression, anxiety  Endocrine: No weight gain, increased thirst  All other systems are comprehensively negative.    Physical Exam:  Vitals:        Vital Signs Last 24 Hrs  T(C): 36.8 (04 Nov 2022 16:31), Max: 37.4 (04 Nov 2022 12:44)  T(F): 98.3 (04 Nov 2022 16:31), Max: 99.4 (04 Nov 2022 12:44)  HR: 85 (04 Nov 2022 18:37) (85 - 93)  BP: 119/74 (04 Nov 2022 16:31) (107/55 - 120/47)  BP(mean): --  RR: 22 (04 Nov 2022 16:31) (16 - 22)  SpO2: 97% (04 Nov 2022 18:37) (93% - 99%)  Parameters below as of 04 Nov 2022 16:31  Patient On (Oxygen Delivery Method): conventional ventilator  General: Unresponsive  HEENT: Trach  Neck: No JVD, no carotid bruit  Lungs: CTAB  CV: RRR, nl S1/S2, no M/R/G  Abdomen: S/NT/ND, +BS  Extremities: No LE edema, no cyanosis  Neuro: AAOx0  Skin: No rash    Labs:                        10.5   10.99 )-----------( 184      ( 04 Nov 2022 13:48 )             35.3     11-04    138  |  102  |  25<H>  ----------------------------<  186<H>  4.9   |  25  |  0.94    Ca    8.5      04 Nov 2022 13:48    TPro  7.7  /  Alb  1.9<L>  /  TBili  0.5  /  DBili  x   /  AST  18  /  ALT  12  /  AlkPhos  159<H>  11-04        PT/INR - ( 04 Nov 2022 13:48 )   PT: 14.3 sec;   INR: 1.21 ratio         PTT - ( 04 Nov 2022 13:48 )  PTT:41.1 sec    ECG/Telemetry:      History of Present Illness: The patient is a 76 year old female with a history of HTN, DM, CVA, dementia, chronic respiratory failure s/p trach, PEG, cardiac arrest, anemia, pleural effusions who presents with fever and respiratory distress. The patient is unable to provide additional history. She has had multiple recent admissions for acute on chronic respiratory failure.      Past Medical/Surgical History:  HTN, DM, CVA, dementia, chronic respiratory failure s/p trach, PEG, cardiac arrest, anemia, pleural effusions     Medications:  Home Medications:  albuterol 90 mcg/inh inhalation aerosol with adapter: 2  inhaled every 6 hours (18 Oct 2022 11:42)  Bacid (LAC) oral tablet: 2 tab(s) by gastrostomy tube once a day (18 Oct 2022 11:42)  Betadine 10% topical swab: cleanse right and left great toes (18 Oct 2022 11:42)  chlorhexidine 0.12% mucous membrane liquid: 15 milliliter(s) mucous membrane 2 times a day (18 Oct 2022 11:42)  Eucerin topical cream: Apply topically to affected area once a day bilateral feet (18 Oct 2022 11:42)  insulin glargine 100 units/mL subcutaneous solution: 8 unit(s) subcutaneous once a day (in the morning) (18 Oct 2022 11:42)  ipratropium-albuterol 0.5 mg-2.5 mg/3 mL inhalation solution: 3 milliliter(s) inhaled 4 times a day (18 Oct 2022 11:42)  LORazepam 1 mg oral tablet: 1 tab(s) by gastrostomy tube every 4 hours (18 Oct 2022 11:42)  midodrine 10 mg oral tablet: 1 tab(s) orally every 8 hours (02 Nov 2022 11:44)  MiraLax oral powder for reconstitution: g-tube (18 Oct 2022 13:04)  Multiple Vitamins oral tablet: 1 tab(s) orally once a day (18 Oct 2022 11:42)  nystatin 100,000 units/g topical powder: 1 application topically 3 times a day (18 Oct 2022 11:42)  omeprazole 20 mg oral delayed release capsule: orally 2 times a day (18 Oct 2022 11:42)  polyethylene glycol 3350 oral powder for reconstitution: 17 gram(s) by gastrostomy tube every 12 hours (18 Oct 2022 11:42)  senna 8.6 mg oral tablet: 3 tab(s) by gastrostomy tube once a day (at bedtime) (18 Oct 2022 11:42)  simethicone 80 mg oral tablet, chewable: 1 tab(s) by gastrostomy tube every 6 hours (18 Oct 2022 11:42)  sucralfate 1 g/10 mL oral suspension: 10 milliliter(s) g-tube 4 times a day (before meals and at bedtime) (18 Oct 2022 13:04)  Tylenol 325 mg oral tablet: 2 tab(s) by gastrostomy tube once a day; 60 minutes prior to dressing change  (18 Oct 2022 11:42)  Tylenol 325 mg oral tablet: 2 tab(s) by gastrostomy tube every 6 hours, As Needed (18 Oct 2022 11:42)      Family History: Non-contributory family history of premature cardiovascular atherosclerotic disease    Social History: No tobacco, alcohol or drug use    Review of Systems:  Unable to obtain    Physical Exam:  Vitals:        Vital Signs Last 24 Hrs  T(C): 36.8 (04 Nov 2022 16:31), Max: 37.4 (04 Nov 2022 12:44)  T(F): 98.3 (04 Nov 2022 16:31), Max: 99.4 (04 Nov 2022 12:44)  HR: 85 (04 Nov 2022 18:37) (85 - 93)  BP: 119/74 (04 Nov 2022 16:31) (107/55 - 120/47)  BP(mean): --  RR: 22 (04 Nov 2022 16:31) (16 - 22)  SpO2: 97% (04 Nov 2022 18:37) (93% - 99%)  Parameters below as of 04 Nov 2022 16:31  Patient On (Oxygen Delivery Method): conventional ventilator  General: Unresponsive  HEENT: Trach  Neck: No JVD, no carotid bruit  Lungs: CTAB  CV: RRR, nl S1/S2, no M/R/G  Abdomen: S/NT/ND, +BS  Extremities: No LE edema, no cyanosis  Neuro: AAOx0  Skin: No rash    Labs:                        10.5   10.99 )-----------( 184      ( 04 Nov 2022 13:48 )             35.3     11-04    138  |  102  |  25<H>  ----------------------------<  186<H>  4.9   |  25  |  0.94    Ca    8.5      04 Nov 2022 13:48    TPro  7.7  /  Alb  1.9<L>  /  TBili  0.5  /  DBili  x   /  AST  18  /  ALT  12  /  AlkPhos  159<H>  11-04        PT/INR - ( 04 Nov 2022 13:48 )   PT: 14.3 sec;   INR: 1.21 ratio         PTT - ( 04 Nov 2022 13:48 )  PTT:41.1 sec    ECG/Telemetry: Fever, respiratory distress

## 2022-11-04 NOTE — CONSULT NOTE ADULT - ASSESSMENT
76 year old female with PMHx of dementia, COPD with chronic respiratory failure s/p trach, cardiac arrest, CHH, quadriplegia, CVA, CKD, anemia, PEG tube, and multiple hospital admissions for respiratory distress presents to the ED BIBEMS from [facility] for fever, SOB, and dark sputum output from trach.     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 Please call with any questions.   Andressa Brantley M.D.  [facility] Division of Infectious Diseases [phone] 76 year old female with PMHx of dementia, COPD with chronic respiratory failure s/p trach, cardiac arrest, CHH, quadriplegia, CVA, CKD, anemia, PEG tube, and multiple hospital admissions for respiratory distress presents to the ED BIBEMS from [facility] for fever, SOB, and dark sputum output from trach.     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 Please call with any questions.   Andressa Brantley M.D.  [facility] Division of Infectious Diseases [phone] 76 year old female with PMHx of dementia, COPD with chronic respiratory failure s/p trach, cardiac arrest, CHH, quadriplegia, CVA, CKD, anemia, PEG tube, and multiple hospital admissions for respiratory distress presents to the ED BIBEMS from [facility] for fever, SOB, and dark sputum output from trach.     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 Please call with any questions.   Andressa Brantley M.D.  [facility] Division of Infectious Diseases [phone]

## 2022-11-04 NOTE — CONSULT NOTE ADULT - SUBJECTIVE AND OBJECTIVE BOX
Date/Time Patient Seen:  		  Referring MD:   Data Reviewed	       Patient is a 76y old  Female who presents with a chief complaint of     Subjective/HPI    in bed  seen and examined  vs noted  labs reviewed  er provider note reviewed  known to me from prior admissions  lives in SNF  non smoker  non drinker    · Chief Complaint: The patient is a 76y Female complaining of fever.  · Unable to Obtain: Dementia  · Details: Pt nonverbal with known history of dementia  · HPI Objective Statement: 76 year old female with PMHx of dementia, COPD with chronic respiratory failure s/p trach, cardiac arrest, CHH, quadriplegia, CVA, CKD, anemia, PEG tube, and multiple hospital admissions for respiratory distress presents to the ED BIBEMS from [facility] complaining of fever, SOB, and dark sputum output from trach. Pt was discharged 2 days ago after being admitted for respiratory distress and pneumonia. Unable to obtain further history secondary to dementia.      ID eval in progress       PAST MEDICAL & SURGICAL HISTORY:  Dementia of frontal lobe type    Aphasic stroke    Diabetes mellitus    Respiratory failure    Hypertension    GERD (gastroesophageal reflux disease)    Constipation    Respiratory failure    CVA (cerebral vascular accident)    HTN (hypertension)    DM (diabetes mellitus)    Advanced dementia    COVID-19 virus detected    Quadriplegia    Pneumonia    Type II diabetes mellitus    Hx of appendectomy    Gastrostomy in place    Tracheostomy in place    Tracheostomy tube present    Feeding by G-tube          Medication list         MEDICATIONS  (STANDING):  chlorhexidine 0.12% Liquid 15 milliLiter(s) Oral Mucosa every 12 hours  piperacillin/tazobactam IVPB.. 3.375 Gram(s) IV Intermittent once  vancomycin  IVPB        MEDICATIONS  (PRN):         Vitals log        ICU Vital Signs Last 24 Hrs  T(C): 36.7 (04 Nov 2022 13:30), Max: 37.4 (04 Nov 2022 12:44)  T(F): 98 (04 Nov 2022 13:30), Max: 99.4 (04 Nov 2022 12:44)  HR: 93 (04 Nov 2022 12:44) (93 - 93)  BP: 120/47 (04 Nov 2022 13:30) (107/55 - 120/47)  BP(mean): --  ABP: --  ABP(mean): --  RR: 19 (04 Nov 2022 13:30) (16 - 19)  SpO2: 98% (04 Nov 2022 13:30) (93% - 98%)    O2 Parameters below as of 04 Nov 2022 13:30  Patient On (Oxygen Delivery Method): conventional ventilator    O2 Concentration (%): 50         Mode: AC/ CMV (Assist Control/ Continuous Mandatory Ventilation)  RR (machine): 16  TV (machine): 400  FiO2: 50  PEEP: 5  ITime: 1  MAP: 15  PIP: 36      Input and Output:  I&O's Detail      Lab Data                        10.5   10.99 )-----------( 184      ( 04 Nov 2022 13:48 )             35.3     11-04    138  |  102  |  25<H>  ----------------------------<  186<H>  4.9   |  25  |  0.94    Ca    8.5      04 Nov 2022 13:48    TPro  7.7  /  Alb  1.9<L>  /  TBili  0.5  /  DBili  x   /  AST  18  /  ALT  12  /  AlkPhos  159<H>  11-04            Review of Systems	  ventilator dep    Objective     Physical Examination    heart s1s2  lung dec BS  head nc  trach  peg      Pertinent Lab findings & Imaging      Woody:  NO   Adequate UO     I&O's Detail           Discussed with:     Cultures:	        Radiology        ACC: [id number] EXAM:  XR CHEST PORTABLE URGENT 1V                          PROCEDURE DATE:  11/04/2022          INTERPRETATION:  AP supine chest on November 4, 2022 at 1:36 PM. Patient   has sepsis.    Heart size normal. Tracheostomy again noted. Right jugular line on   October 19 is been removed.    There are persistent advanced bilateral infiltrates a small basilar   effusions right greater than left.    Lungs are rather unchanged compared to October 19 this year.    IMPRESSION: As above.    --- End of Report ---            HANSEL ANDRADE MD; Attending Radiologist  This document has been electronically signed. Nov 4 2022  1:54PM

## 2022-11-04 NOTE — H&P ADULT - NSICDXFAMILYHX_GEN_ALL_CORE_FT
Alert-The patient is alert, awake and responds to voice. The patient is oriented to time, place, and person. The triage nurse is able to obtain subjective information. FAMILY HISTORY:  No pertinent family history in first degree relatives

## 2022-11-05 LAB
ALBUMIN SERPL ELPH-MCNC: 1.8 G/DL — LOW (ref 3.3–5)
ALP SERPL-CCNC: 139 U/L — HIGH (ref 30–120)
ALT FLD-CCNC: 15 U/L DA — SIGNIFICANT CHANGE UP (ref 10–60)
ANION GAP SERPL CALC-SCNC: 6 MMOL/L — SIGNIFICANT CHANGE UP (ref 5–17)
AST SERPL-CCNC: 18 U/L — SIGNIFICANT CHANGE UP (ref 10–40)
BILIRUB SERPL-MCNC: 0.6 MG/DL — SIGNIFICANT CHANGE UP (ref 0.2–1.2)
BUN SERPL-MCNC: 19 MG/DL — SIGNIFICANT CHANGE UP (ref 7–23)
CALCIUM SERPL-MCNC: 8.3 MG/DL — LOW (ref 8.4–10.5)
CHLORIDE SERPL-SCNC: 104 MMOL/L — SIGNIFICANT CHANGE UP (ref 96–108)
CO2 SERPL-SCNC: 28 MMOL/L — SIGNIFICANT CHANGE UP (ref 22–31)
CREAT SERPL-MCNC: 0.91 MG/DL — SIGNIFICANT CHANGE UP (ref 0.5–1.3)
EGFR: 65 ML/MIN/1.73M2 — SIGNIFICANT CHANGE UP
GLUCOSE BLDC GLUCOMTR-MCNC: 105 MG/DL — HIGH (ref 70–99)
GLUCOSE BLDC GLUCOMTR-MCNC: 106 MG/DL — HIGH (ref 70–99)
GLUCOSE BLDC GLUCOMTR-MCNC: 110 MG/DL — HIGH (ref 70–99)
GLUCOSE BLDC GLUCOMTR-MCNC: 89 MG/DL — SIGNIFICANT CHANGE UP (ref 70–99)
GLUCOSE SERPL-MCNC: 97 MG/DL — SIGNIFICANT CHANGE UP (ref 70–99)
GRAM STN FLD: SIGNIFICANT CHANGE UP
HCT VFR BLD CALC: 31.7 % — LOW (ref 34.5–45)
HGB BLD-MCNC: 9.4 G/DL — LOW (ref 11.5–15.5)
INR BLD: 1.28 RATIO — HIGH (ref 0.88–1.16)
LACTATE SERPL-SCNC: 0.8 MMOL/L — SIGNIFICANT CHANGE UP (ref 0.7–2)
MCHC RBC-ENTMCNC: 27.5 PG — SIGNIFICANT CHANGE UP (ref 27–34)
MCHC RBC-ENTMCNC: 29.7 GM/DL — LOW (ref 32–36)
MCV RBC AUTO: 92.7 FL — SIGNIFICANT CHANGE UP (ref 80–100)
NRBC # BLD: 0 /100 WBCS — SIGNIFICANT CHANGE UP (ref 0–0)
PHOSPHATE SERPL-MCNC: 2.8 MG/DL — SIGNIFICANT CHANGE UP (ref 2.5–4.5)
PLATELET # BLD AUTO: 184 K/UL — SIGNIFICANT CHANGE UP (ref 150–400)
POTASSIUM SERPL-MCNC: 4.7 MMOL/L — SIGNIFICANT CHANGE UP (ref 3.5–5.3)
POTASSIUM SERPL-SCNC: 4.7 MMOL/L — SIGNIFICANT CHANGE UP (ref 3.5–5.3)
PROCALCITONIN SERPL-MCNC: 0.86 NG/ML — HIGH (ref 0.02–0.1)
PROT SERPL-MCNC: 7.2 G/DL — SIGNIFICANT CHANGE UP (ref 6–8.3)
PROTHROM AB SERPL-ACNC: 15.1 SEC — HIGH (ref 10.5–13.4)
RAPID RVP RESULT: SIGNIFICANT CHANGE UP
RBC # BLD: 3.42 M/UL — LOW (ref 3.8–5.2)
RBC # FLD: 17.1 % — HIGH (ref 10.3–14.5)
SARS-COV-2 RNA SPEC QL NAA+PROBE: SIGNIFICANT CHANGE UP
SODIUM SERPL-SCNC: 138 MMOL/L — SIGNIFICANT CHANGE UP (ref 135–145)
SPECIMEN SOURCE: SIGNIFICANT CHANGE UP
WBC # BLD: 9.13 K/UL — SIGNIFICANT CHANGE UP (ref 3.8–10.5)
WBC # FLD AUTO: 9.13 K/UL — SIGNIFICANT CHANGE UP (ref 3.8–10.5)

## 2022-11-05 PROCEDURE — 99233 SBSQ HOSP IP/OBS HIGH 50: CPT

## 2022-11-05 RX ORDER — SODIUM CHLORIDE 9 MG/ML
1000 INJECTION INTRAMUSCULAR; INTRAVENOUS; SUBCUTANEOUS ONCE
Refills: 0 | Status: COMPLETED | OUTPATIENT
Start: 2022-11-05 | End: 2022-11-05

## 2022-11-05 RX ORDER — CHLORHEXIDINE GLUCONATE 213 G/1000ML
1 SOLUTION TOPICAL DAILY
Refills: 0 | Status: DISCONTINUED | OUTPATIENT
Start: 2022-11-05 | End: 2022-11-10

## 2022-11-05 RX ORDER — FENTANYL CITRATE 50 UG/ML
25 INJECTION INTRAVENOUS ONCE
Refills: 0 | Status: DISCONTINUED | OUTPATIENT
Start: 2022-11-05 | End: 2022-11-05

## 2022-11-05 RX ADMIN — Medication 1 MILLIGRAM(S): at 23:44

## 2022-11-05 RX ADMIN — HEPARIN SODIUM 5000 UNIT(S): 5000 INJECTION INTRAVENOUS; SUBCUTANEOUS at 18:03

## 2022-11-05 RX ADMIN — Medication 250 MILLIGRAM(S): at 05:48

## 2022-11-05 RX ADMIN — FENTANYL CITRATE 25 MICROGRAM(S): 50 INJECTION INTRAVENOUS at 20:39

## 2022-11-05 RX ADMIN — SENNA PLUS 2 TABLET(S): 8.6 TABLET ORAL at 21:23

## 2022-11-05 RX ADMIN — Medication 1 MILLIGRAM(S): at 12:06

## 2022-11-05 RX ADMIN — MIDODRINE HYDROCHLORIDE 10 MILLIGRAM(S): 2.5 TABLET ORAL at 12:06

## 2022-11-05 RX ADMIN — Medication 2 MILLIGRAM(S): at 07:00

## 2022-11-05 RX ADMIN — Medication 2 MILLIGRAM(S): at 19:42

## 2022-11-05 RX ADMIN — SIMETHICONE 80 MILLIGRAM(S): 80 TABLET, CHEWABLE ORAL at 23:44

## 2022-11-05 RX ADMIN — CHLORHEXIDINE GLUCONATE 15 MILLILITER(S): 213 SOLUTION TOPICAL at 18:04

## 2022-11-05 RX ADMIN — Medication 2 MILLIGRAM(S): at 03:15

## 2022-11-05 RX ADMIN — POLYETHYLENE GLYCOL 3350 17 GRAM(S): 17 POWDER, FOR SOLUTION ORAL at 12:06

## 2022-11-05 RX ADMIN — Medication 1 TABLET(S): at 12:06

## 2022-11-05 RX ADMIN — Medication 1 MILLIGRAM(S): at 05:51

## 2022-11-05 RX ADMIN — FENTANYL CITRATE 25 MICROGRAM(S): 50 INJECTION INTRAVENOUS at 20:26

## 2022-11-05 RX ADMIN — Medication 2 MILLIGRAM(S): at 11:00

## 2022-11-05 RX ADMIN — Medication 2 MILLIGRAM(S): at 15:46

## 2022-11-05 RX ADMIN — MIDODRINE HYDROCHLORIDE 10 MILLIGRAM(S): 2.5 TABLET ORAL at 05:51

## 2022-11-05 RX ADMIN — MIDODRINE HYDROCHLORIDE 10 MILLIGRAM(S): 2.5 TABLET ORAL at 18:04

## 2022-11-05 RX ADMIN — HEPARIN SODIUM 5000 UNIT(S): 5000 INJECTION INTRAVENOUS; SUBCUTANEOUS at 05:52

## 2022-11-05 RX ADMIN — SIMETHICONE 80 MILLIGRAM(S): 80 TABLET, CHEWABLE ORAL at 18:04

## 2022-11-05 RX ADMIN — CHLORHEXIDINE GLUCONATE 15 MILLILITER(S): 213 SOLUTION TOPICAL at 05:50

## 2022-11-05 RX ADMIN — PANTOPRAZOLE SODIUM 40 MILLIGRAM(S): 20 TABLET, DELAYED RELEASE ORAL at 06:40

## 2022-11-05 RX ADMIN — SIMETHICONE 80 MILLIGRAM(S): 80 TABLET, CHEWABLE ORAL at 12:06

## 2022-11-05 RX ADMIN — SODIUM CHLORIDE 1000 MILLILITER(S): 9 INJECTION INTRAMUSCULAR; INTRAVENOUS; SUBCUTANEOUS at 08:23

## 2022-11-05 RX ADMIN — SIMETHICONE 80 MILLIGRAM(S): 80 TABLET, CHEWABLE ORAL at 05:51

## 2022-11-05 RX ADMIN — CHLORHEXIDINE GLUCONATE 1 APPLICATION(S): 213 SOLUTION TOPICAL at 06:39

## 2022-11-05 RX ADMIN — Medication 1 MILLIGRAM(S): at 18:03

## 2022-11-05 RX ADMIN — CHLORHEXIDINE GLUCONATE 1 APPLICATION(S): 213 SOLUTION TOPICAL at 12:05

## 2022-11-05 NOTE — PROGRESS NOTE ADULT - ASSESSMENT
76 year old female with PMHx of dementia, COPD with chronic respiratory failure s/p trach, cardiac arrest, CHH, quadriplegia, CVA, CKD, anemia, PEG tube, and multiple hospital admissions for respiratory distress presents to the ED BIBEMS from [facility] complaining of fever,    sepsis  chr resp failure  dementia  copd  atelectasis  dysphagia  peg  trach  cva  ckd  anemia    GOC documented  pt is full code   Marciano - HCP    curr NPO  on Levaquin  TLC placed  overnight events noted  vs noted  labs reviewed

## 2022-11-05 NOTE — PROGRESS NOTE ADULT - SUBJECTIVE AND OBJECTIVE BOX
[facility], Division of Infectious Diseases  MOIZ Lora Y. Patel, S. Shah, G. [facility]  [phone]    Name: BREA BECKHAM  Age: 76y  Gender: Female  MRN: [id number]    Interval History:  Patient seen and examined at bedside  No acute overnight events. Afebrile  Notes reviewed    Antibiotics:  levoFLOXacin IVPB 750 milliGRAM(s) IV Intermittent every 24 hours      Medications:  acetaminophen     Tablet .. 650 milliGRAM(s) Oral every 6 hours PRN  chlorhexidine 0.12% Liquid 15 milliLiter(s) Oral Mucosa every 12 hours  chlorhexidine 2% Cloths 1 Application(s) Topical daily  dextrose 5%. 1000 milliLiter(s) IV Continuous <Continuous>  dextrose 5%. 1000 milliLiter(s) IV Continuous <Continuous>  dextrose 50% Injectable 25 Gram(s) IV Push once  dextrose 50% Injectable 12.5 Gram(s) IV Push once  dextrose 50% Injectable 25 Gram(s) IV Push once  dextrose Oral Gel 15 Gram(s) Oral once PRN  glucagon  Injectable 1 milliGRAM(s) IntraMuscular once  heparin   Injectable 5000 Unit(s) SubCutaneous every 12 hours  insulin lispro (ADMELOG) corrective regimen sliding scale   SubCutaneous every 6 hours  lactobacillus acidophilus 1 Tablet(s) Oral daily  levoFLOXacin IVPB 750 milliGRAM(s) IV Intermittent every 24 hours  LORazepam     Tablet 1 milliGRAM(s) Oral four times a day  LORazepam   Injectable 2 milliGRAM(s) IV Push every 4 hours PRN  midodrine. 10 milliGRAM(s) Oral three times a day  ondansetron Injectable 4 milliGRAM(s) IV Push every 8 hours PRN  pantoprazole    Tablet 40 milliGRAM(s) Oral before breakfast  polyethylene glycol 3350 17 Gram(s) Oral daily  senna 2 Tablet(s) Oral at bedtime  simethicone 80 milliGRAM(s) Chew every 6 hours      Review of Systems:  unable to obtain    Allergies: codeine (Hives)    For details regarding the patient's past medical history, social history, family history, and other miscellaneous elements, please refer the initial infectious diseases consultation and/or the admitting history and physical examination for this admission.    Objective:  Vitals:   T(C): 36.6 (22 @ 12:30), Max: 37.7 (22 @ 21:00)  HR: 65 (22 @ 15:01) (62 - 105)  BP: 111/74 (22 @ 15:00) (86/36 - 155/99)  RR: 16 (22 @ 15:00) (16 - 28)  SpO2: 100% (22 @ 15:01) (94% - 100%)    Physical Examination:  General: chronically ill appearing   Neck: trach  HEENT: NC/AT  Lungs: MV breath sounds  Heart: Regular rate and rhythm. No murmur, rub or gallop.  Abdomen: Soft. Nondistended. Nontender. PGT in place  Skin: Warm. Dry. Good turgor.        Laboratory Studies:  CBC:                       9.4    9.13  )-----------( 184      ( 2022 06:34 )             31.7     CMP:     138  |  104  |  19  ----------------------------<  97  4.7   |  28  |  0.91    Ca    8.3<L>      2022 06:34  Phos  2.8         TPro  7.2  /  Alb  1.8<L>  /  TBili  0.6  /  DBili  x   /  AST  18  /  ALT  15  /  AlkPhos  139<H>      LIVER FUNCTIONS - ( 2022 06:34 )  Alb: 1.8 g/dL / Pro: 7.2 g/dL / ALK PHOS: 139 U/L / ALT: 15 U/L DA / AST: 18 U/L / GGT: x           Urinalysis Basic - ( 2022 13:48 )    Color: Yellow / Appearance: Clear / S.015 / pH: x  Gluc: x / Ketone: Negative  / Bili: Negative / Urobili: Negative mg/dL   Blood: x / Protein: 100 mg/dL / Nitrite: Negative   Leuk Esterase: Moderate / RBC: 0-2 /HPF / WBC 11-25   Sq Epi: x / Non Sq Epi: Occasional / Bacteria: Moderate        Microbiology: reviewed    Culture - Sputum (collected 22 @ 06:39)  Source: Trach Asp Tracheal Aspirate  Gram Stain (22 @ 14:45):    Few Squamous epithelial cells per low power field    Few polymorphonuclear leukocytes per low power field    Rare Gram Positive Rods per oil power field    Rare Gram Negative Rods per oil power field          Radiology: reviewed

## 2022-11-05 NOTE — PROGRESS NOTE ADULT - SUBJECTIVE AND OBJECTIVE BOX
Date/Time Patient Seen:  		  Referring MD:   Data Reviewed	       Patient is a 76y old  Female who presents with a chief complaint of     Subjective/HPI     PAST MEDICAL & SURGICAL HISTORY:  Dementia of frontal lobe type    Aphasic stroke    Diabetes mellitus    Respiratory failure    Hypertension    GERD (gastroesophageal reflux disease)    Constipation    Respiratory failure    CVA (cerebral vascular accident)    HTN (hypertension)    DM (diabetes mellitus)    Advanced dementia    COVID-19 virus detected    Quadriplegia    Pneumonia    Type II diabetes mellitus    Hx of appendectomy    Gastrostomy in place    Tracheostomy in place    Tracheostomy tube present    Feeding by G-tube          Medication list         MEDICATIONS  (STANDING):  chlorhexidine 0.12% Liquid 15 milliLiter(s) Oral Mucosa every 12 hours  chlorhexidine 2% Cloths 1 Application(s) Topical daily  dextrose 5%. 1000 milliLiter(s) (100 mL/Hr) IV Continuous <Continuous>  dextrose 5%. 1000 milliLiter(s) (50 mL/Hr) IV Continuous <Continuous>  dextrose 50% Injectable 25 Gram(s) IV Push once  dextrose 50% Injectable 12.5 Gram(s) IV Push once  dextrose 50% Injectable 25 Gram(s) IV Push once  glucagon  Injectable 1 milliGRAM(s) IntraMuscular once  heparin   Injectable 5000 Unit(s) SubCutaneous every 12 hours  insulin lispro (ADMELOG) corrective regimen sliding scale   SubCutaneous every 6 hours  lactobacillus acidophilus 1 Tablet(s) Oral daily  levoFLOXacin IVPB 750 milliGRAM(s) IV Intermittent every 24 hours  LORazepam     Tablet 1 milliGRAM(s) Oral four times a day  midodrine. 10 milliGRAM(s) Oral three times a day  pantoprazole    Tablet 40 milliGRAM(s) Oral before breakfast  polyethylene glycol 3350 17 Gram(s) Oral daily  senna 2 Tablet(s) Oral at bedtime  simethicone 80 milliGRAM(s) Chew every 6 hours    MEDICATIONS  (PRN):  acetaminophen     Tablet .. 650 milliGRAM(s) Oral every 6 hours PRN Temp greater or equal to 38C (100.4F), Mild Pain (1 - 3)  dextrose Oral Gel 15 Gram(s) Oral once PRN Blood Glucose LESS THAN 70 milliGRAM(s)/deciliter  LORazepam   Injectable 2 milliGRAM(s) IV Push every 4 hours PRN Agitation  ondansetron Injectable 4 milliGRAM(s) IV Push every 8 hours PRN Nausea and/or Vomiting         Vitals log        ICU Vital Signs Last 24 Hrs  T(C): 37.3 (05 Nov 2022 03:30), Max: 37.7 (04 Nov 2022 21:00)  T(F): 99.1 (05 Nov 2022 03:30), Max: 99.8 (04 Nov 2022 21:00)  HR: 83 (05 Nov 2022 06:00) (78 - 105)  BP: 107/51 (05 Nov 2022 06:00) (90/53 - 120/68)  BP(mean): 69 (05 Nov 2022 06:00) (64 - 72)  ABP: --  ABP(mean): --  RR: 19 (05 Nov 2022 06:00) (16 - 22)  SpO2: 100% (05 Nov 2022 06:00) (93% - 100%)    O2 Parameters below as of 05 Nov 2022 06:00  Patient On (Oxygen Delivery Method): ventilator    O2 Concentration (%): 50         Mode: AC/ CMV (Assist Control/ Continuous Mandatory Ventilation)  RR (machine): 16  TV (machine): 400  FiO2: 40  PEEP: 5  ITime: 1  MAP: 19  PIP: 35      Input and Output:  I&O's Detail    04 Nov 2022 07:01  -  05 Nov 2022 07:00  --------------------------------------------------------  IN:    IV PiggyBack: 400 mL  Total IN: 400 mL    OUT:    Voided (mL): 250 mL  Total OUT: 250 mL    Total NET: 150 mL          Lab Data                        9.4    9.13  )-----------( 184      ( 05 Nov 2022 06:34 )             31.7     11-05    138  |  104  |  19  ----------------------------<  97  4.7   |  28  |  0.91    Ca    8.3<L>      05 Nov 2022 06:34  Phos  2.8     11-05    TPro  7.2  /  Alb  1.8<L>  /  TBili  0.6  /  DBili  x   /  AST  18  /  ALT  15  /  AlkPhos  139<H>  11-05            Review of Systems	      Objective     Physical Examination    heart s1s2  lung dec BS      Pertinent Lab findings & Imaging      Annalise:  NO   Adequate UO     I&O's Detail    04 Nov 2022 07:01  -  05 Nov 2022 07:00  --------------------------------------------------------  IN:    IV PiggyBack: 400 mL  Total IN: 400 mL    OUT:    Voided (mL): 250 mL  Total OUT: 250 mL    Total NET: 150 mL               Discussed with:     Cultures:	        Radiology

## 2022-11-05 NOTE — DIETITIAN INITIAL EVALUATION ADULT - REASON FOR ADMISSION
Per H&P "76 year old female with PMHx of dementia, COPD with chronic respiratory failure s/p trach, cardiac arrest, CHH, quadriplegia, CVA, CKD, anemia, PEG tube, and multiple hospital admissions for respiratory distress a/w sepsis and acute on chronic hypoxic respiratory failure due to suspected recurrent VAP."

## 2022-11-05 NOTE — DIETITIAN INITIAL EVALUATION ADULT - OTHER INFO
Visited patient in room, trached on vent with PEG. No reported n/v/d/c, no BM noted in house, bowel regimen in place. NKFA. Previous reported .2#, adm weight 108.2# from last month, no current dosing weight, current daily weight 129.4#, ? weight accuracy in house, will continue to monitor weight trends as able.     Pertinent medications/nutrition labs reviewed; noted glucose, FS WNL today; pt hx of type 2 DM, A1c increased from 7.3% (8/19) to 8.5% (11/5) indicating fair glycemic control. In house ordered for lispro sliding scale to aid in glucose management.     When medically feasible, recommend to initiate feeds of Pulmocare 1.5 @ 20 ml/hr, increase by 10 ml q8hr till goal rate of 35 ml/hr x24hrs. Feeds at goal rate will provide 840 ml total volume, 1260 kcal, 52 g protein, 659 ml free water. Add 250 ml free water q6hr to provide 1000 ml H2o. Add Lucas (7 gm arginine/7 gm glutamine/1.5 gm hmb) x1 packet qd and NC Prosource 30ml (60 kcal, 15 g protein) qd via PEG to aid in wound healing. RD to continue to monitor nutrition status per protocol.

## 2022-11-05 NOTE — PROGRESS NOTE ADULT - ASSESSMENT
76 year old female with PMHx of dementia, COPD with chronic respiratory failure s/p trach, cardiac arrest, CHH, quadriplegia, CVA, CKD, anemia, PEG tube, and multiple hospital admissions for respiratory distress presents to the ED BIBEMS from [facility] for fever, SOB, and dark sputum output from trach.     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 Please call with any questions.   Andressa Brantley M.D.  [facility] Division of Infectious Diseases [phone] 76 year old female with PMHx of dementia, COPD with chronic respiratory failure s/p trach, cardiac arrest, CHH, quadriplegia, CVA, CKD, anemia, PEG tube, and multiple hospital admissions for respiratory distress presents to the ED BIBEMS from [facility] for fever, SOB, and dark sputum output from trach.     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 Please call with any questions.   Andressa Brantley M.D.  [facility] Division of Infectious Diseases [phone]

## 2022-11-05 NOTE — PROGRESS NOTE ADULT - SUBJECTIVE AND OBJECTIVE BOX
Patient is a 76y old  Female who presents with a chief complaint of     INTERVAL HPI/OVERNIGHT EVENTS:    pt was hypotensive in the AM SBP in the 80s, recycled in the 90s.  1L bolus NS ordered    MEDICATIONS  (STANDING):  chlorhexidine 0.12% Liquid 15 milliLiter(s) Oral Mucosa every 12 hours  chlorhexidine 2% Cloths 1 Application(s) Topical daily  dextrose 5%. 1000 milliLiter(s) (100 mL/Hr) IV Continuous <Continuous>  dextrose 5%. 1000 milliLiter(s) (50 mL/Hr) IV Continuous <Continuous>  dextrose 50% Injectable 25 Gram(s) IV Push once  dextrose 50% Injectable 12.5 Gram(s) IV Push once  dextrose 50% Injectable 25 Gram(s) IV Push once  glucagon  Injectable 1 milliGRAM(s) IntraMuscular once  heparin   Injectable 5000 Unit(s) SubCutaneous every 12 hours  insulin lispro (ADMELOG) corrective regimen sliding scale   SubCutaneous every 6 hours  lactobacillus acidophilus 1 Tablet(s) Oral daily  levoFLOXacin IVPB 750 milliGRAM(s) IV Intermittent every 24 hours  LORazepam     Tablet 1 milliGRAM(s) Oral four times a day  midodrine. 10 milliGRAM(s) Oral three times a day  pantoprazole    Tablet 40 milliGRAM(s) Oral before breakfast  polyethylene glycol 3350 17 Gram(s) Oral daily  senna 2 Tablet(s) Oral at bedtime  simethicone 80 milliGRAM(s) Chew every 6 hours    MEDICATIONS  (PRN):  acetaminophen     Tablet .. 650 milliGRAM(s) Oral every 6 hours PRN Temp greater or equal to 38C (100.4F), Mild Pain (1 - 3)  dextrose Oral Gel 15 Gram(s) Oral once PRN Blood Glucose LESS THAN 70 milliGRAM(s)/deciliter  LORazepam   Injectable 2 milliGRAM(s) IV Push every 4 hours PRN Agitation  ondansetron Injectable 4 milliGRAM(s) IV Push every 8 hours PRN Nausea and/or Vomiting      Allergies    codeine (Hives)    Intolerances        REVIEW OF SYSTEMS:  as above    Vital Signs Last 24 Hrs  T(C): 37.3 (2022 03:30), Max: 37.7 (2022 21:00)  T(F): 99.1 (2022 03:30), Max: 99.8 (2022 21:00)  HR: 82 (2022 08:05) (78 - 105)  BP: 98/66 (2022 08:05) (86/36 - 120/68)  BP(mean): 77 (2022 08:05) (51 - 77)  RR: 16 (2022 08:05) (16 - 22)  SpO2: 95% (2022 08:05) (93% - 100%)    Parameters below as of 2022 08:04  Patient On (Oxygen Delivery Method): ventilator    O2 Concentration (%): 50    PHYSICAL EXAM:  GENERAL: NAD, Non Verbal  HEAD:  Atraumatic, Normocephalic  ENMT: Trach to Vent   NECK: Supple, No JVD, Normal thyroid  CHEST/LUNG: Clear to auscultation bilaterally; No rales, rhonchi, wheezing, or rubs  HEART: Regular rate and rhythm; No murmurs, rubs, or gallops  ABDOMEN: Soft, Nontender, PEG Tube +   EXTREMITIES:  2+ Peripheral Pulses, No clubbing, cyanosis, or edema    LABS:                        9.4    9.13  )-----------( 184      ( 2022 06:34 )             31.7     2022 06:34    138    |  104    |  19     ----------------------------<  97     4.7     |  28     |  0.91     Ca    8.3        2022 06:34  Phos  2.8       2022 06:34    TPro  7.2    /  Alb  1.8    /  TBili  0.6    /  DBili  x      /  AST  18     /  ALT  15     /  AlkPhos  139    2022 06:34    PT/INR - ( 2022 06:34 )   PT: 15.1 sec;   INR: 1.28 ratio         PTT - ( 2022 13:48 )  PTT:41.1 sec  Urinalysis Basic - ( 2022 13:48 )    Color: Yellow / Appearance: Clear / S.015 / pH: x  Gluc: x / Ketone: Negative  / Bili: Negative / Urobili: Negative mg/dL   Blood: x / Protein: 100 mg/dL / Nitrite: Negative   Leuk Esterase: Moderate / RBC: 0-2 /HPF / WBC 11-25   Sq Epi: x / Non Sq Epi: Occasional / Bacteria: Moderate      CAPILLARY BLOOD GLUCOSE      POCT Blood Glucose.: 110 mg/dL (2022 05:54)  POCT Blood Glucose.: 136 mg/dL (2022 23:35)      RADIOLOGY & ADDITIONAL TESTS:

## 2022-11-05 NOTE — DIETITIAN INITIAL EVALUATION ADULT - NSFNSPHYEXAMSKINFT_GEN_A_CORE
Pressure Injury 1: sacrum, Stage II  Pressure Injury 2: Left:,gluteal, Stage IV  Pressure Injury 3: Right:,ankle, Stage II  Pressure Injury 4: Right:,fifth toe, Unstageable  Pressure Injury 5: Right:,first toe, Unstageable  Pressure Injury 6: Left:,first toe, Unstageable

## 2022-11-05 NOTE — PROGRESS NOTE ADULT - ASSESSMENT
REVIEW OF SYMPTOMS      Able to give (reliable) ROS  NO     PHYSICAL EXAM    HEENT Unremarkable  atraumatic   RESP Fair air entry EXP prolonged    Harsh breath sound Resp distres mild   CARDIAC S1 S2 No S3     NO JVD    ABDOMEN SOFT BS PRESENT NOT DISTENDED No hepatosplenomegaly   PEDAL EDEMA present No calf tenderness  NO rash       GENERAL DATA .   GOC.        ALLGY. codeine                           WT.   BMI.                              ICU STAY. 11/4/2022  COVID.  11/4 scv2 (-)     BEST PRACTICE ISSUES.    HOB ELEVATN. Yes  DVT PPLX.  11/4/2022 hpsc    SQUIRES PPLX.  11/4/2022 protonix 40     INFN PPLX.    11/5/2022 chlorhexidine 2%   11/5/2022 chlorhexidine .12%   SP SW EM.        DIET. 11/5/2022 jevity 1.5 30/h gt    VS/ PO/IO/ VENT/ DRIPS.   11/5/2022 60 120/60   11/5/2022 16/400/5/.4     ASSESSMENT/RECOMMENDATIONS .   RESP.  -- Gas exchange.  Monitor & target po 90-95%  -- VENT MANAGEMENT.   HOB elevation  Target Pplat 30 (-)  Target PO 90-95%  Target pH 730 (+)  Daily spontaneous breathing trials   Daily sedation vacation   -- Pleural effsn  Past ritchie   R THORAC   6/8/2022  g 161 l 222 p 4.9 p 10 l 16 .38    L THORAC   5/25/2022 g 183 l 144/381 .37 p 3.4/5.3 .64 p 23 l 36   5/25/2022l pl fluid  lymph pred exudate   cyto (-)     Ct ch 10/18/2022 sm r mod l effs large subpulmonic component   echo 8/19/2022 dd1 n lvsf mod ph pasp 58   a/r No thoracentesis plannd at this point risk prohibitive in vent pt   -- RO VTE  11/4/2022  venous duplx legs (-)  INFECTION.  .. SCV2 status.  Scv2 11/4/2022 scv2 (-)   .. VAP.  RECENT ID HISTORY  5/2/2022 SERRATIA CARBAPENEM RESISTANT PSEUDOMONAS  uc 10/18 100K E coli  sp 10/19 Stenotrophomonas  10/19-10/26/2022 levaquin 750  10/21/2022 rocephin x 7d Dr BRITTANY Brantley  w 11/4-11/5/2022 w 10.9 - 9.1   ua 11/4/2022 w 11-25   cxr 11/4/2022 persistent advanced bl infiltr cw 11/4/2022 small basl effesns r gr  than l   rvp 11/4 (-)   11/4/2022 levaquin 750    CARDIAC.  .. Hemodynamics.   11/4/2022 midodrine 10.3  echo 8/19/2022 dd1 n lvsf mod ph pasp 58   target map 65 (+)   GI.  .. Nutrition.  11/4/2022 tf  .. constipation.  11/4/2022 senna   11/4/2022 miralax   HEMAT.  .. DVT pplx.   11/4/2022 hpsc   .. anemia.  Hb 11/4-11/5/2022 Hb 10.5 - 9.4   mcv 11/4/2022 mcv 93   monitor   RENAL.  .. renal parameters   Na 11/4/2022 Na 139  co2 11/4/2022 co2 25   cr 11/4/2022 cr .9   IV fl.  ENDOCRINE.   NEURO.  .. seizures.  11/4/2022 lorazepam 1.4     OVERALL ASSESSMENT/DISPOSITION.  76 f PMH trach peg cva cac anoxic encephalo PH 8/19/2022 pasp 58 bl pl effs  r thora 6/8/2022 and l thora 5/25/2022 were lp exudates  recurrent hospitalizations HO CR psuedomonas 5/2/2022 zosyn 8/19/2022  recent admission 10/18-11/2/2022 uc 10/18 100K E coli  sp 10/19 Stenotrophomonas  10/19-10/26/2022 levaquin 750  10/21/2022 rocepcem haas 7d Dr BRITTANY Brantley sent back from [facility] 11/4/2022 with fever   Pulm crit care consulted 11/4/2022    .. VAP   11/4/2022 levaquin   11/4/2022 vanco     .. Resp distress  11/4 v duplx (-)   On vent fio2 40       TIME SPENT   Over 39 minutes aggregate critical care time spent on encounter; activities included   direct patient care, counseling and/or coordinating care reviewing notes, lab data/ imaging , discussion with multidisciplinary team/ patient  /family and explaining in detail risks, benefits, alternatives  of the recommendations     CHAPINCITO GUIDRY 76 f [facility] S 11/4/2022   DR HIMANSHU RANDHAWA

## 2022-11-05 NOTE — DIETITIAN INITIAL EVALUATION ADULT - ADD RECOMMEND
When medically feasible, recommend to initiate feeds of Pulmocare 1.5 @ 20 ml/hr, increase by 10 ml q8hr till goal rate of 35 ml/hr x24hrs. Feeds at goal rate will provide 840 ml total volume, 1260 kcal, 52 g protein, 659 ml free water. Add 250 ml free water q6hr to provide 1000 ml H2o. Add Lucas (7 gm arginine/7 gm glutamine/1.5 gm hmb) x1 packet qd and NC Prosource 30ml (60 kcal, 15 g protein) qd via PEG to aid in wound healing.

## 2022-11-05 NOTE — DIETITIAN INITIAL EVALUATION ADULT - ORAL INTAKE PTA/DIET HISTORY
Per transfer document, pt from [facility], was discharged 4 days ago. Was NPO with Glucerna1.5 pta @60 ml/hr, received 1200 ml over 24 hrs, provided 1800 kcal, 99 g protein; 250 ml free water q6hr (1000 ml H2o), also 25 ml/hr +25 ml PC (252 ml H2o). Was taking multivitamin. Pt known to writer from previous admission with hx of malnutrition.

## 2022-11-05 NOTE — PROGRESS NOTE ADULT - SUBJECTIVE AND OBJECTIVE BOX
CC:  Patient is a 76y old  Female who presents with a chief complaint of Per H&P "76 year old female with PMHx of dementia, COPD with chronic respiratory failure s/p trach, cardiac arrest, CHH, quadriplegia, CVA, CKD, anemia, PEG tube, and multiple hospital admissions for respiratory distress a/w sepsis and acute on chronic hypoxic respiratory failure due to suspected recurrent VAP."      (2022 14:36)      HPI/BRIEF HOSPITAL COURSE:   76 year old female with PMHx of dementia, COPD with chronic respiratory failure s/p trach, cardiac arrest, CHH, quadriplegia, CVA with aphasia, CKD, anemia, PEG tube, and multiple hospital admissions for respiratory distress presents to the ED BIBEMS from [facility] complaining of fever, SOB, and dark sputum output from trach. Pt was discharged 2 days ago after being admitted for respiratory distress and pneumonia.     Events last 24 hours:   Remains vented on 16/400/5/50%. Trach aspirate grows gram + rods consistent with previous cultures. No other events over night.     PAST MEDICAL & SURGICAL HISTORY:  Dementia of frontal lobe type      Aphasic stroke      Diabetes mellitus      Respiratory failure      Hypertension      GERD (gastroesophageal reflux disease)      Constipation      Respiratory failure      CVA (cerebral vascular accident)      HTN (hypertension)      DM (diabetes mellitus)      Advanced dementia      COVID-19 virus detected      Quadriplegia      Pneumonia      Type II diabetes mellitus      Hx of appendectomy      Gastrostomy in place      Tracheostomy in place      Tracheostomy tube present      Feeding by G-tube        Allergies    codeine (Hives)    Intolerances      FAMILY HISTORY:  No pertinent family history in first degree relatives        Review of Systems:  Negative 2/2 to Non-verbal    Medications:  levoFLOXacin IVPB 750 milliGRAM(s) IV Intermittent every 24 hours    midodrine. 10 milliGRAM(s) Oral three times a day      acetaminophen     Tablet .. 650 milliGRAM(s) Oral every 6 hours PRN  LORazepam     Tablet 1 milliGRAM(s) Oral four times a day  LORazepam   Injectable 2 milliGRAM(s) IV Push every 4 hours PRN  ondansetron Injectable 4 milliGRAM(s) IV Push every 8 hours PRN      heparin   Injectable 5000 Unit(s) SubCutaneous every 12 hours    pantoprazole    Tablet 40 milliGRAM(s) Oral before breakfast  polyethylene glycol 3350 17 Gram(s) Oral daily  senna 2 Tablet(s) Oral at bedtime  simethicone 80 milliGRAM(s) Chew every 6 hours      dextrose 50% Injectable 25 Gram(s) IV Push once  dextrose 50% Injectable 12.5 Gram(s) IV Push once  dextrose 50% Injectable 25 Gram(s) IV Push once  dextrose Oral Gel 15 Gram(s) Oral once PRN  glucagon  Injectable 1 milliGRAM(s) IntraMuscular once  insulin lispro (ADMELOG) corrective regimen sliding scale   SubCutaneous every 6 hours    dextrose 5%. 1000 milliLiter(s) IV Continuous <Continuous>  dextrose 5%. 1000 milliLiter(s) IV Continuous <Continuous>      chlorhexidine 0.12% Liquid 15 milliLiter(s) Oral Mucosa every 12 hours  chlorhexidine 2% Cloths 1 Application(s) Topical daily    lactobacillus acidophilus 1 Tablet(s) Oral daily      Mode: AC/ CMV (Assist Control/ Continuous Mandatory Ventilation)  RR (machine): 16  TV (machine): 400  FiO2: 50  PEEP: 5  ITime: 1  MAP: 15  PIP: 44      ICU Vital Signs Last 24 Hrs  T(C): 36.9 (2022 20:00), Max: 37.3 (2022 03:30)  T(F): 98.4 (2022 20:00), Max: 99.1 (2022 03:30)  HR: 77 (2022 21:00) (62 - 93)  BP: 108/58 (2022 21:00) (86/36 - 155/99)  BP(mean): 74 (2022 21:00) (51 - 113)  ABP: --  ABP(mean): --  RR: 16 (:00) (16 - 42)  SpO2: 100% (:00) (95% - 100%)    O2 Parameters below as of :00  Patient On (Oxygen Delivery Method): ventilator  O2 Flow (L/min): 50        Vital Signs Last 24 Hrs  T(C): 36.9 (2022 20:00), Max: 37.3 (2022 03:30)  T(F): 98.4 (2022 20:00), Max: 99.1 (2022 03:30)  HR: 77 (2022 21:00) (62 - 93)  BP: 108/58 (2022 21:00) (86/36 - 155/99)  BP(mean): 74 (2022 21:00) (51 - 113)  RR: 16 (2022 21:00) (16 - 42)  SpO2: 100% (2022 21:00) (95% - 100%)    Parameters below as of 2022 21:00  Patient On (Oxygen Delivery Method): ventilator  O2 Flow (L/min): 50          I&O's Detail    2022 07:01  -  2022 07:00  --------------------------------------------------------  IN:    IV PiggyBack: 400 mL  Total IN: 400 mL    OUT:    Voided (mL): 250 mL  Total OUT: 250 mL    Total NET: 150 mL      2022 08:01  -  2022 21:48  --------------------------------------------------------  IN:    Sodium Chloride 0.9% Bolus: 1000 mL  Total IN: 1000 mL    OUT:  Total OUT: 0 mL    Total NET: 1000 mL            LABS:                        9.4    9.13  )-----------( 184      ( 2022 06:34 )             31.7     11-    138  |  104  |  19  ----------------------------<  97  4.7   |  28  |  0.91    Ca    8.3<L>      2022 06:34  Phos  2.8     11-    TPro  7.2  /  Alb  1.8<L>  /  TBili  0.6  /  DBili  x   /  AST  18  /  ALT  15  /  AlkPhos  139<H>  11-05          CAPILLARY BLOOD GLUCOSE      POCT Blood Glucose.: 89 mg/dL (2022 18:08)    PT/INR - ( 2022 06:34 )   PT: 15.1 sec;   INR: 1.28 ratio         PTT - ( 2022 13:48 )  PTT:41.1 sec  Urinalysis Basic - ( 2022 13:48 )    Color: Yellow / Appearance: Clear / S.015 / pH: x  Gluc: x / Ketone: Negative  / Bili: Negative / Urobili: Negative mg/dL   Blood: x / Protein: 100 mg/dL / Nitrite: Negative   Leuk Esterase: Moderate / RBC: 0-2 /HPF / WBC 11-25   Sq Epi: x / Non Sq Epi: Occasional / Bacteria: Moderate      CULTURES:  Culture Results:   No growth to date. ( @ 13:52)  Culture Results:   No growth to date. ( @ 13:52)    levoFLOXacin IVPB 750 milliGRAM(s) IV Intermittent every 24 hours      Physical Examination:  General: Trach to vent. Non-verbal.   NEURO: Awake, tracks with eyes. CN2-12 intact.   HEENT: Pupils equal, reactive to light.  Symmetric.  PULM: Diminshed B/l. Thick secretions when suctioning.  no wheezes, rales, rhonchi  CVS: Regular rate and rhythm, no murmurs, rubs, or gallops  ABD: Soft, nondistended, nontender, normoactive bowel sounds, no masses  EXT: No edema, nontender  SKIN: Warm and well perfused, no rashes noted        RADIOLOGY:   < from: US Duplex Venous Lower Ext Complete, Bilateral (22 @ 21:59) >  LEFT:  Normal compressibility of the LEFT common femoral, femoral and popliteal   veins.  Doppler examination shows normal spontaneous and phasic flow.  No LEFT calf vein thrombosis is detected.    IMPRESSION:  No evidence of deep venous thrombosis in either lower extremity.    --- End of Report ---    < end of copied text >    < from: Xray Chest 1 View- PORTABLE-Urgent (22 @ 13:51) >  There are persistent advanced bilateral infiltrates a small basilar   effusions right greater than left.    Lungs are rather unchanged compared to  this year.    IMPRESSION: As above.    --- End of Report ---      < end of copied text >

## 2022-11-05 NOTE — PROGRESS NOTE ADULT - SUBJECTIVE AND OBJECTIVE BOX
Chief Complaint: Fever, respiratory distress    Interval Events: No events overnight.    Review of Systems:  Unable to obtain    Physical Exam:  Vitals:        Vital Signs Last 24 Hrs  T(C): 36.7 (05 Nov 2022 08:30), Max: 37.7 (04 Nov 2022 21:00)  T(F): 98 (05 Nov 2022 08:30), Max: 99.8 (04 Nov 2022 21:00)  HR: 71 (05 Nov 2022 09:00) (68 - 105)  BP: 116/65 (05 Nov 2022 09:00) (86/36 - 120/68)  BP(mean): 81 (05 Nov 2022 09:00) (51 - 81)  RR: 16 (05 Nov 2022 09:00) (16 - 22)  SpO2: 100% (05 Nov 2022 09:00) (93% - 100%)  Parameters below as of 05 Nov 2022 08:04  Patient On (Oxygen Delivery Method): ventilator  O2 Concentration (%): 50  General: NAD  HEENT: Trach  Neck: No JVD, no carotid bruit  Lungs: CTAB  CV: RRR, nl S1/S2, no M/R/G  Abdomen: S/NT/ND, +BS  Extremities: No LE edema, no cyanosis  Neuro: AAOx0  Skin: No rash    Labs:                        9.4    9.13  )-----------( 184      ( 05 Nov 2022 06:34 )             31.7     11-05    138  |  104  |  19  ----------------------------<  97  4.7   |  28  |  0.91    Ca    8.3<L>      05 Nov 2022 06:34  Phos  2.8     11-05    TPro  7.2  /  Alb  1.8<L>  /  TBili  0.6  /  DBili  x   /  AST  18  /  ALT  15  /  AlkPhos  139<H>  11-05        PT/INR - ( 05 Nov 2022 06:34 )   PT: 15.1 sec;   INR: 1.28 ratio         PTT - ( 04 Nov 2022 13:48 )  PTT:41.1 sec    ECG/Telemetry: Sinus rhythm

## 2022-11-05 NOTE — PROGRESS NOTE ADULT - SUBJECTIVE AND OBJECTIVE BOX
BREA BECKHAM     [facility] 02    Allergies    codeine (Hives)    Intolerances        PAST MEDICAL & SURGICAL HISTORY:  Dementia of frontal lobe type      Aphasic stroke      Diabetes mellitus      Respiratory failure      Hypertension      GERD (gastroesophageal reflux disease)      Constipation      Respiratory failure      CVA (cerebral vascular accident)      HTN (hypertension)      DM (diabetes mellitus)      Advanced dementia      COVID-19 virus detected      Quadriplegia      Pneumonia      Type II diabetes mellitus      Hx of appendectomy      Gastrostomy in place      Tracheostomy in place      Tracheostomy tube present      Feeding by G-tube          FAMILY HISTORY:  No pertinent family history in first degree relatives        Home Medications:  albuterol 90 mcg/inh inhalation aerosol with adapter: 2  inhaled every 6 hours (18 Oct 2022 11:42)  Bacid (LAC) oral tablet: 2 tab(s) by gastrostomy tube once a day (18 Oct 2022 11:42)  Betadine 10% topical swab: cleanse right and left great toes (18 Oct 2022 11:42)  chlorhexidine 0.12% mucous membrane liquid: 15 milliliter(s) mucous membrane 2 times a day (18 Oct 2022 11:42)  Eucerin topical cream: Apply topically to affected area once a day bilateral feet (18 Oct 2022 11:42)  insulin glargine 100 units/mL subcutaneous solution: 8 unit(s) subcutaneous once a day (in the morning) (18 Oct 2022 11:42)  ipratropium-albuterol 0.5 mg-2.5 mg/3 mL inhalation solution: 3 milliliter(s) inhaled 4 times a day (18 Oct 2022 11:42)  LORazepam 1 mg oral tablet: 1 tab(s) by gastrostomy tube every 4 hours (18 Oct 2022 11:42)  midodrine 10 mg oral tablet: 1 tab(s) orally every 8 hours (2022 11:44)  MiraLax oral powder for reconstitution: g-tube (18 Oct 2022 13:04)  Multiple Vitamins oral tablet: 1 tab(s) orally once a day (18 Oct 2022 11:42)  nystatin 100,000 units/g topical powder: 1 application topically 3 times a day (18 Oct 2022 11:42)  omeprazole 20 mg oral delayed release capsule: orally 2 times a day (18 Oct 2022 11:42)  polyethylene glycol 3350 oral powder for reconstitution: 17 gram(s) by gastrostomy tube every 12 hours (18 Oct 2022 11:42)  senna 8.6 mg oral tablet: 3 tab(s) by gastrostomy tube once a day (at bedtime) (18 Oct 2022 11:42)  simethicone 80 mg oral tablet, chewable: 1 tab(s) by gastrostomy tube every 6 hours (18 Oct 2022 11:42)  sucralfate 1 g/10 mL oral suspension: 10 milliliter(s) g-tube 4 times a day (before meals and at bedtime) (18 Oct 2022 13:04)  Tylenol 325 mg oral tablet: 2 tab(s) by gastrostomy tube once a day; 60 minutes prior to dressing change  (18 Oct 2022 11:42)  Tylenol 325 mg oral tablet: 2 tab(s) by gastrostomy tube every 6 hours, As Needed (18 Oct 2022 11:42)      MEDICATIONS  (STANDING):  chlorhexidine 0.12% Liquid 15 milliLiter(s) Oral Mucosa every 12 hours  chlorhexidine 2% Cloths 1 Application(s) Topical daily  dextrose 5%. 1000 milliLiter(s) (100 mL/Hr) IV Continuous <Continuous>  dextrose 5%. 1000 milliLiter(s) (50 mL/Hr) IV Continuous <Continuous>  dextrose 50% Injectable 25 Gram(s) IV Push once  dextrose 50% Injectable 12.5 Gram(s) IV Push once  dextrose 50% Injectable 25 Gram(s) IV Push once  glucagon  Injectable 1 milliGRAM(s) IntraMuscular once  heparin   Injectable 5000 Unit(s) SubCutaneous every 12 hours  insulin lispro (ADMELOG) corrective regimen sliding scale   SubCutaneous every 6 hours  lactobacillus acidophilus 1 Tablet(s) Oral daily  levoFLOXacin IVPB 750 milliGRAM(s) IV Intermittent every 24 hours  LORazepam     Tablet 1 milliGRAM(s) Oral four times a day  midodrine. 10 milliGRAM(s) Oral three times a day  pantoprazole    Tablet 40 milliGRAM(s) Oral before breakfast  polyethylene glycol 3350 17 Gram(s) Oral daily  senna 2 Tablet(s) Oral at bedtime  simethicone 80 milliGRAM(s) Chew every 6 hours    MEDICATIONS  (PRN):  acetaminophen     Tablet .. 650 milliGRAM(s) Oral every 6 hours PRN Temp greater or equal to 38C (100.4F), Mild Pain (1 - 3)  dextrose Oral Gel 15 Gram(s) Oral once PRN Blood Glucose LESS THAN 70 milliGRAM(s)/deciliter  LORazepam   Injectable 2 milliGRAM(s) IV Push every 4 hours PRN Agitation  ondansetron Injectable 4 milliGRAM(s) IV Push every 8 hours PRN Nausea and/or Vomiting      Diet, NPO (22 @ 17:56) [Active]          Vital Signs Last 24 Hrs  T(C): 37.3 (2022 03:30), Max: 37.7 (2022 21:00)  T(F): 99.1 (2022 03:30), Max: 99.8 (2022 21:00)  HR: 82 (2022 08:05) (78 - 105)  BP: 98/66 (2022 08:05) (86/36 - 120/68)  BP(mean): 77 (2022 08:05) (51 - 77)  RR: 16 (2022 08:05) (16 - 22)  SpO2: 95% (2022 08:05) (93% - 100%)    Parameters below as of 2022 08:04  Patient On (Oxygen Delivery Method): ventilator    O2 Concentration (%): 50      22 @ 07:01  -  22 @ 07:00  --------------------------------------------------------  IN: 400 mL / OUT: 250 mL / NET: 150 mL        Mode: AC/ CMV (Assist Control/ Continuous Mandatory Ventilation), RR (machine): 16, TV (machine): 400, FiO2: 50, PEEP: 5, ITime: 1, MAP: 17, PIP: 48      LABS:                        9.4    9.13  )-----------( 184      ( 2022 06:34 )             31.7         138  |  104  |  19  ----------------------------<  97  4.7   |  28  |  0.91    Ca    8.3<L>      2022 06:34  Phos  2.8         TPro  7.2  /  Alb  1.8<L>  /  TBili  0.6  /  DBili  x   /  AST  18  /  ALT  15  /  AlkPhos  139<H>      PT/INR - ( 2022 06:34 )   PT: 15.1 sec;   INR: 1.28 ratio         PTT - ( 2022 13:48 )  PTT:41.1 sec  Urinalysis Basic - ( 2022 13:48 )    Color: Yellow / Appearance: Clear / S.015 / pH: x  Gluc: x / Ketone: Negative  / Bili: Negative / Urobili: Negative mg/dL   Blood: x / Protein: 100 mg/dL / Nitrite: Negative   Leuk Esterase: Moderate / RBC: 0-2 /HPF / WBC    Sq Epi: x / Non Sq Epi: Occasional / Bacteria: Moderate            WBC:  WBC Count: 9.13 K/uL ( @ 06:34)  WBC Count: 10.99 K/uL ( @ 13:48)  WBC Count: 8.30 K/uL ( @ 06:18)      MICROBIOLOGY:  RECENT CULTURES:              PT/INR - ( 2022 06:34 )   PT: 15.1 sec;   INR: 1.28 ratio         PTT - ( 2022 13:48 )  PTT:41.1 sec    Sodium:  Sodium, Serum: 138 mmol/L ( @ 06:34)  Sodium, Serum: 138 mmol/L ( @ 13:48)  Sodium, Serum: 137 mmol/L ( 06:18)      0.91 mg/dL :34  0.94 mg/dL  13:48  0.80 mg/dL  @ 06:18      Hemoglobin:  Hemoglobin: 9.4 g/dL ( @ 06:34)  Hemoglobin: 10.5 g/dL ( @ 13:48)  Hemoglobin: 9.2 g/dL ( @ 06:18)      Platelets: Platelet Count - Automated: 184 K/uL ( @ 06:34)  Platelet Count - Automated: 184 K/uL ( @ 13:48)  Platelet Count - Automated: 167 K/uL ( @ 06:18)      LIVER FUNCTIONS - ( 2022 06:34 )  Alb: 1.8 g/dL / Pro: 7.2 g/dL / ALK PHOS: 139 U/L / ALT: 15 U/L DA / AST: 18 U/L / GGT: x             Urinalysis Basic - ( 2022 13:48 )    Color: Yellow / Appearance: Clear / S.015 / pH: x  Gluc: x / Ketone: Negative  / Bili: Negative / Urobili: Negative mg/dL   Blood: x / Protein: 100 mg/dL / Nitrite: Negative   Leuk Esterase: Moderate / RBC: 0-2 /HPF / WBC 11-25   Sq Epi: x / Non Sq Epi: Occasional / Bacteria: Moderate        RADIOLOGY & ADDITIONAL STUDIES:      MICROBIOLOGY:  RECENT CULTURES:

## 2022-11-05 NOTE — DIETITIAN INITIAL EVALUATION ADULT - PERTINENT MEDS FT
MEDICATIONS  (STANDING):  chlorhexidine 0.12% Liquid 15 milliLiter(s) Oral Mucosa every 12 hours  chlorhexidine 2% Cloths 1 Application(s) Topical daily  dextrose 5%. 1000 milliLiter(s) (100 mL/Hr) IV Continuous <Continuous>  dextrose 5%. 1000 milliLiter(s) (50 mL/Hr) IV Continuous <Continuous>  dextrose 50% Injectable 25 Gram(s) IV Push once  dextrose 50% Injectable 12.5 Gram(s) IV Push once  dextrose 50% Injectable 25 Gram(s) IV Push once  glucagon  Injectable 1 milliGRAM(s) IntraMuscular once  heparin   Injectable 5000 Unit(s) SubCutaneous every 12 hours  insulin lispro (ADMELOG) corrective regimen sliding scale   SubCutaneous every 6 hours  lactobacillus acidophilus 1 Tablet(s) Oral daily  levoFLOXacin IVPB 750 milliGRAM(s) IV Intermittent every 24 hours  LORazepam     Tablet 1 milliGRAM(s) Oral four times a day  midodrine. 10 milliGRAM(s) Oral three times a day  pantoprazole    Tablet 40 milliGRAM(s) Oral before breakfast  polyethylene glycol 3350 17 Gram(s) Oral daily  senna 2 Tablet(s) Oral at bedtime  simethicone 80 milliGRAM(s) Chew every 6 hours    MEDICATIONS  (PRN):  acetaminophen     Tablet .. 650 milliGRAM(s) Oral every 6 hours PRN Temp greater or equal to 38C (100.4F), Mild Pain (1 - 3)  dextrose Oral Gel 15 Gram(s) Oral once PRN Blood Glucose LESS THAN 70 milliGRAM(s)/deciliter  LORazepam   Injectable 2 milliGRAM(s) IV Push every 4 hours PRN Agitation  ondansetron Injectable 4 milliGRAM(s) IV Push every 8 hours PRN Nausea and/or Vomiting

## 2022-11-05 NOTE — PROGRESS NOTE ADULT - ASSESSMENT
Assessment   76 year old Full code female with PMHx of dementia, COPD with chronic respiratory failure s/p trach, cardiac arrest, CHH, quadriplegia, CVA with aphasia, CKD, anemia, PEG tube, and multiple hospital admissions for respiratory distress presents to the ED CARLENE from [facility] is admitted for acute on chronic hypoxic RF 2/2 to VAP. CXR shows persistent bibasilar infiltrate. Discharged 2 days ago with PNA associate with CRE pseudomonas and Stenotrophomonas maltophilia, trach aspirate on grew rare gram - rods and gram + rods from 10/5. Continues to be vented on 16/400/5/ 50%, requring frequent suctioning for thick secretions. Pt is critically ill with high risk for acute deterioration and is in SPCU for continued treatment of   1. Acute on chronic hypoxic RF  2. VAP    Plan  Neuro: Ativan standing and prn for agitation. Fentanyl for vent synchrony   Cardio: Midodrine 10 mg q 8. Maintain MAP >65. Echo Reviewed. Cardiology consult appreciated     Pulm: Remains trach to vent, 16/ 400/ 5/50%. Keep Plateau pressure < 30. Vent bundle in place. Chest PT. Frequent suctioning. Asp precautions  GI: NPO at this time. PPI. Bowel regimen as ordered.   Renal: Scr stable. Woody, monitor I/O's. Trend Scr and lytes and replete as needed.  Heme: Lovenox for DVT prophylaxis. Doppler LE negative  ID: S/p Vanc and zosyn in ED. Levaquin for pseudomonal coverage. Suspected VAP with CXR showing persistent bibasilar infiltrates.  Trach aspirate grew rare gram + rods, Gram - rods, hx of CRE pseudomonas and Stenotrophomonas maltophilia. Afebrile, normal WBC count. F/u BC. ID consult appreciated   Dispo: Critically ill in SPCU. Prognosis guarded at best, palliative following      Critical Care time: 35 mins assessing presenting problems of acute illness that poses high probability of life threatening deterioration or end organ damage/dysfunction.  Medical decision making including Initiating plan of care, reviewing data, reviewing radiology, direct patient bedside evaluation and interpretation of vital signs, any necessary ventilator management , discussion with multidisciplinary team, discussing goals of care with patient/family, all non inclusive of procedures

## 2022-11-06 LAB
ALBUMIN SERPL ELPH-MCNC: 1.7 G/DL — LOW (ref 3.3–5)
ALP SERPL-CCNC: 120 U/L — SIGNIFICANT CHANGE UP (ref 30–120)
ALT FLD-CCNC: <10 U/L DA — LOW (ref 10–60)
ANION GAP SERPL CALC-SCNC: 8 MMOL/L — SIGNIFICANT CHANGE UP (ref 5–17)
AST SERPL-CCNC: 15 U/L — SIGNIFICANT CHANGE UP (ref 10–40)
BILIRUB SERPL-MCNC: 0.8 MG/DL — SIGNIFICANT CHANGE UP (ref 0.2–1.2)
BUN SERPL-MCNC: 15 MG/DL — SIGNIFICANT CHANGE UP (ref 7–23)
CALCIUM SERPL-MCNC: 8.3 MG/DL — LOW (ref 8.4–10.5)
CHLORIDE SERPL-SCNC: 103 MMOL/L — SIGNIFICANT CHANGE UP (ref 96–108)
CO2 SERPL-SCNC: 25 MMOL/L — SIGNIFICANT CHANGE UP (ref 22–31)
CREAT SERPL-MCNC: 0.93 MG/DL — SIGNIFICANT CHANGE UP (ref 0.5–1.3)
EGFR: 63 ML/MIN/1.73M2 — SIGNIFICANT CHANGE UP
GLUCOSE BLDC GLUCOMTR-MCNC: 109 MG/DL — HIGH (ref 70–99)
GLUCOSE BLDC GLUCOMTR-MCNC: 111 MG/DL — HIGH (ref 70–99)
GLUCOSE BLDC GLUCOMTR-MCNC: 144 MG/DL — HIGH (ref 70–99)
GLUCOSE BLDC GLUCOMTR-MCNC: 97 MG/DL — SIGNIFICANT CHANGE UP (ref 70–99)
GLUCOSE SERPL-MCNC: 98 MG/DL — SIGNIFICANT CHANGE UP (ref 70–99)
HCT VFR BLD CALC: 34.7 % — SIGNIFICANT CHANGE UP (ref 34.5–45)
HGB BLD-MCNC: 10.2 G/DL — LOW (ref 11.5–15.5)
INR BLD: 1.34 RATIO — HIGH (ref 0.88–1.16)
MCHC RBC-ENTMCNC: 27.1 PG — SIGNIFICANT CHANGE UP (ref 27–34)
MCHC RBC-ENTMCNC: 29.4 GM/DL — LOW (ref 32–36)
MCV RBC AUTO: 92.3 FL — SIGNIFICANT CHANGE UP (ref 80–100)
NRBC # BLD: 0 /100 WBCS — SIGNIFICANT CHANGE UP (ref 0–0)
PLATELET # BLD AUTO: 180 K/UL — SIGNIFICANT CHANGE UP (ref 150–400)
POTASSIUM SERPL-MCNC: 4.2 MMOL/L — SIGNIFICANT CHANGE UP (ref 3.5–5.3)
POTASSIUM SERPL-SCNC: 4.2 MMOL/L — SIGNIFICANT CHANGE UP (ref 3.5–5.3)
PROCALCITONIN SERPL-MCNC: 0.62 NG/ML — HIGH (ref 0.02–0.1)
PROT SERPL-MCNC: 7.2 G/DL — SIGNIFICANT CHANGE UP (ref 6–8.3)
PROTHROM AB SERPL-ACNC: 15.4 SEC — HIGH (ref 10.5–13.4)
RBC # BLD: 3.76 M/UL — LOW (ref 3.8–5.2)
RBC # FLD: 17.2 % — HIGH (ref 10.3–14.5)
SODIUM SERPL-SCNC: 136 MMOL/L — SIGNIFICANT CHANGE UP (ref 135–145)
WBC # BLD: 10.04 K/UL — SIGNIFICANT CHANGE UP (ref 3.8–10.5)
WBC # FLD AUTO: 10.04 K/UL — SIGNIFICANT CHANGE UP (ref 3.8–10.5)

## 2022-11-06 PROCEDURE — 99233 SBSQ HOSP IP/OBS HIGH 50: CPT

## 2022-11-06 RX ADMIN — Medication 10 MILLIGRAM(S): at 11:56

## 2022-11-06 RX ADMIN — Medication 2 MILLIGRAM(S): at 01:19

## 2022-11-06 RX ADMIN — Medication 1 MILLIGRAM(S): at 11:56

## 2022-11-06 RX ADMIN — Medication 2 MILLIGRAM(S): at 07:48

## 2022-11-06 RX ADMIN — HEPARIN SODIUM 5000 UNIT(S): 5000 INJECTION INTRAVENOUS; SUBCUTANEOUS at 18:00

## 2022-11-06 RX ADMIN — Medication 1 MILLIGRAM(S): at 06:02

## 2022-11-06 RX ADMIN — Medication 1 MILLIGRAM(S): at 18:00

## 2022-11-06 RX ADMIN — MIDODRINE HYDROCHLORIDE 10 MILLIGRAM(S): 2.5 TABLET ORAL at 06:03

## 2022-11-06 RX ADMIN — MIDODRINE HYDROCHLORIDE 10 MILLIGRAM(S): 2.5 TABLET ORAL at 11:57

## 2022-11-06 RX ADMIN — POLYETHYLENE GLYCOL 3350 17 GRAM(S): 17 POWDER, FOR SOLUTION ORAL at 11:55

## 2022-11-06 RX ADMIN — Medication 1 TABLET(S): at 11:57

## 2022-11-06 RX ADMIN — CHLORHEXIDINE GLUCONATE 1 APPLICATION(S): 213 SOLUTION TOPICAL at 11:55

## 2022-11-06 RX ADMIN — SENNA PLUS 2 TABLET(S): 8.6 TABLET ORAL at 21:29

## 2022-11-06 RX ADMIN — CHLORHEXIDINE GLUCONATE 15 MILLILITER(S): 213 SOLUTION TOPICAL at 18:00

## 2022-11-06 RX ADMIN — HEPARIN SODIUM 5000 UNIT(S): 5000 INJECTION INTRAVENOUS; SUBCUTANEOUS at 06:02

## 2022-11-06 RX ADMIN — PANTOPRAZOLE SODIUM 40 MILLIGRAM(S): 20 TABLET, DELAYED RELEASE ORAL at 06:03

## 2022-11-06 RX ADMIN — SIMETHICONE 80 MILLIGRAM(S): 80 TABLET, CHEWABLE ORAL at 11:56

## 2022-11-06 RX ADMIN — Medication 2 MILLIGRAM(S): at 18:16

## 2022-11-06 RX ADMIN — SIMETHICONE 80 MILLIGRAM(S): 80 TABLET, CHEWABLE ORAL at 18:00

## 2022-11-06 RX ADMIN — MIDODRINE HYDROCHLORIDE 10 MILLIGRAM(S): 2.5 TABLET ORAL at 18:00

## 2022-11-06 RX ADMIN — SIMETHICONE 80 MILLIGRAM(S): 80 TABLET, CHEWABLE ORAL at 06:02

## 2022-11-06 RX ADMIN — Medication 1 MILLIGRAM(S): at 23:40

## 2022-11-06 RX ADMIN — CHLORHEXIDINE GLUCONATE 15 MILLILITER(S): 213 SOLUTION TOPICAL at 06:01

## 2022-11-06 RX ADMIN — SIMETHICONE 80 MILLIGRAM(S): 80 TABLET, CHEWABLE ORAL at 23:40

## 2022-11-06 NOTE — PROGRESS NOTE ADULT - ASSESSMENT
76 year old female with PMHx of dementia, COPD with chronic respiratory failure s/p trach, cardiac arrest, CHH, quadriplegia, CVA, CKD, anemia, PEG tube, and multiple hospital admissions for respiratory distress a/w sepsis and acute on chronic hypoxic respiratory failure due to suspected recurrent VAP.    Severe sepsis and acute hypoxic respiratory failure 2/2 gram-negative PNA   SPCU monitoring  - ID (Karon/Brendon mccoy), recs appreciated   Now on levaquin and vancomycin per ID recs  - c/w midodrine with hold parameters -- consider titrating dose if BP elevated  - cc/pulm consult (Jaime), recs appreciated  - palliative care (Emre), recs appreciated - pt is full code  f/u cultures    Constipation  dulcolax suppository ordered    Diastolic CHF  Hx of Pleural effusions  - last TTE was 5/30 with EF 65% with normal LVSF, dilated RV, and moderate pHTN -> repeat TTE appears unchanged  - may need repeat thoracentesis (had 1.5L drained few admissions ago), pulmonology consulted; pl effusion was parapneumonic on past admission as opposed to transudative and thus less likely related to CHF    Anemia of chronic disease  - has been evaluated by GI and hematology in the past -> dx with anemia of chronic disease with intermittent GI bleeding  - no jacqueline GI bleed per nursing     hx of HTN  - not on any anti-hypertensive meds at facility  - monitor VS    DM2  - NPO with TF  - c/w moderate dose lispro coverage sliding scale q6h  - goal -180 - now at goal - c/w lantus 5un sq QAM

## 2022-11-06 NOTE — PROGRESS NOTE ADULT - ASSESSMENT
Assessment  76 year old Full code female with PMHx of dementia, COPD with chronic respiratory failure s/p trach, cardiac arrest, CHH, quadriplegia, CVA with aphasia, CKD, anemia, PEG tube, and multiple hospital admissions for respiratory distress presents to the ED CARLENE from [facility] is admitted for acute on chronic hypoxic RF 2/2 to VAP. CXR shows persistent bibasilar infiltrate. Discharged 2 days ago with PNA associate with CRE pseudomonas and Stenotrophomonas maltophilia, trach aspirate on grew rare gram - rods and gram + rods from 10/5. Continues to be vented on 16/400/5/ 50%, requring frequent suctioning for thick secretions. Started tube feeds for nutrition.  Pt remains in SPCU for treatment off  1. Acute on chronic hypoxic RF  2. VAP    Plan  Neuro: Ativan standing and prn for agitation.   Cardio: Midodrine 10 mg q 8. Maintain MAP >65. Echo Reviewed. Cardiology consult appreciated     Pulm: Remains trach to vent, 16/ 400/ 5/50%. Keep Plateau pressure < 30. Vent bundle in place. Chest PT. Frequent suctioning. Asp precautions. Pulm consult apprecaited  GI: NPO with tube feeds started.  PPI. Bowel regimen as ordered.   Renal: No active issues. Trend Scr and lytes and replete as needed.  Heme: Lovenox for DVT prophylaxis.  ID: S/p Vanc and zosyn in ED. Continue Levaquin for pseudomonal coverage. Suspected VAP with CXR showing persistent bibasilar infiltrates.  Trach aspirate grew rare gram + rods, Gram - rods, hx of CRE pseudomonas and Stenotrophomonas maltophilia. Afebrile, normal WBC count. F/u BC. ID consult appreciated   Dispo: Remains in SPCU. Guarded prognosis.    Assessment  76 year old Full code female with PMHx of dementia, COPD with chronic respiratory failure s/p trach, cardiac arrest, CHH, quadriplegia, CVA with aphasia, CKD, anemia, PEG tube, and multiple hospital admissions for respiratory distress presents to the ED CARLENE from [facility] is admitted for acute on chronic hypoxic RF 2/2 to VAP. CXR shows persistent bibasilar infiltrate. Discharged 2 days ago with PNA associate with CRE pseudomonas and Stenotrophomonas maltophilia, trach aspirate on grew rare gram - rods and gram + rods from 10/5. Continues to be vented on 16/400/5/ 50%, requring frequent suctioning for thick secretions. Started tube feeds for nutrition.  Pt remains in SPCU for treatment off  1. Acute on chronic hypoxic RF  2. VAP    Plan  Neuro: Ativan standing and prn for agitation.   Cardio: Midodrine 10 mg q 8. Maintain MAP >65. Echo Reviewed. Cardiology consult appreciated     Pulm: Remains trach to vent, 16/ 400/ 5/50%. Will try to decrease FIo2 requirements to 40% while maintaining Spo2 > 92 %.Keep Plateau pressure < 30. Vent bundle in place. Chest PT. Frequent suctioning. Asp precautions. Pulm consult appreciated  GI: NPO with tube feeds started.  PPI. Bowel regimen as ordered.   Renal: No active issues. Trend Scr and lytes and replete as needed.  Heme: Lovenox for DVT prophylaxis.  ID: S/p Vanc and zosyn in ED. Continue Levaquin for pseudomonal coverage. Suspected VAP with CXR showing persistent bibasilar infiltrates.  Trach aspirate grew rare gram + rods, Gram - rods, hx of CRE pseudomonas and Stenotrophomonas maltophilia. Afebrile, normal WBC count. F/u BC. ID consult appreciated   Dispo: Remains in SPCU. Guarded prognosis.

## 2022-11-06 NOTE — PROGRESS NOTE ADULT - ASSESSMENT
REVIEW OF SYMPTOMS      Able to give (reliable) ROS  NO     PHYSICAL EXAM    HEENT Unremarkable  atraumatic   RESP Fair air entry EXP prolonged    Harsh breath sound Resp distres mild   CARDIAC S1 S2 No S3     NO JVD    ABDOMEN SOFT BS PRESENT NOT DISTENDED No hepatosplenomegaly   PEDAL EDEMA present No calf tenderness  NO rash   GENERAL DATA .   GOC.        ALLGY. codeine                           WT.   BMI.                              ICU STAY. 11/4/2022  COVID.  11/4 scv2 (-)     BEST PRACTICE ISSUES.    HOB ELEVATN. Yes  DVT PPLX.  11/4/2022 hpsc    SQUIRES PPLX.  11/4/2022 protonix 40     INFN PPLX.    11/5/2022 chlorhexidine 2%   11/5/2022 chlorhexidine .12%   SP SW EM.        DIET. 11/5/2022 jevity 1.5 30/h gt    VS/ PO/IO/ VENT/ DRIPS.   11/6/2022 afeb 69 100/60   11/6/2022 ac 16/400/5/.5     ASSESSMENT/RECOMMENDATIONS .   RESP.  -- Gas exchange.  Monitor & target po 90-95%  -- VENT MANAGEMENT.   HOB elevation  Target Pplat 30 (-)  Target PO 90-95%  Target pH 730 (+)  Daily spontaneous breathing trials   Daily sedation vacation   .. Episodes of tachypnea while on vent causing vent dyssynchrony  11/6/2022 she has remained hemodynamically stable and pulse ox has been ok  I think these episodes are neuro and have been noted on prior admissions with no successful intervention so far   -- Pleural effsn  Past ritchie   R THORAC   6/8/2022  g 161 l 222 p 4.9 p 10 l 16 .38    L THORAC   5/25/2022 g 183 l 144/381 .37 p 3.4/5.3 .64 p 23 l 36   5/25/2022l pl fluid  lymph pred exudate   cyto (-)     Ct ch 10/18/2022 sm r mod l effs large subpulmonic component   echo 8/19/2022 dd1 n lvsf mod ph pasp 58   a/r No thoracentesis plannd at this point risk prohibitive in vent pt   -- RO VTE  11/4/2022  venous duplx legs (-)  INFECTION.  .. SCV2 status.  Scv2 11/4/2022 scv2 (-)   .. VAP.  RECENT ID HISTORY  5/2/2022 SERRATIA CARBAPENEM RESISTANT PSEUDOMONAS  uc 10/18 100K E coli  sp 10/19 Stenotrophomonas  10/19-10/26/2022 levaquin 750  10/21/2022 rocephin x 7d Dr BRITTANY Brantley  w 11/4-11/5/2022 w 10.9 - 9.1   pr 11/6/2022 .6   ua 11/4/2022 w 11-25   cxr 11/4/2022 persistent advanced bl infiltr cw 11/4/2022 small basl effesns r gr  than l   rvp 11/4 (-)   11/4/2022 levaquin 750    CARDIAC.  .. Hemodynamics.   11/4/2022 midodrine 10.3  echo 8/19/2022 dd1 n lvsf mod ph pasp 58   target map 65 (+)   GI.  .. Nutrition.  11/4/2022 tf  .. constipation.  11/4/2022 senna   11/4/2022 miralax   HEMAT.  .. DVT pplx.   11/4/2022 hpsc   .. anemia.  Hb 11/4-11/5/2022 Hb 10.5 - 9.4   mcv 11/4/2022 mcv 93   monitor   RENAL.  .. renal parameters   Na 11/4/2022 Na 139  co2 11/4/2022 co2 25   cr 11/4/2022 cr .9   IV fl.  ENDOCRINE.   NEURO.  .. seizures.  11/4/2022 lorazepam 1.4     OVERALL ASSESSMENT/DISPOSITION.  76 f PMH trach peg cva cac anoxic encephalo PH 8/19/2022 pasp 58 bl pl effs  r thora 6/8/2022 and l thora 5/25/2022 were lp exudates  recurrent hospitalizations HO CR psuedomonas 5/2/2022 zosyn 8/19/2022  recent admission 10/18-11/2/2022 uc 10/18 100K E coli  sp 10/19 Stenotrophomonas  10/19-10/26/2022 levaquin 750  10/21/2022 rocepcem x 7d Dr BRITTANY Brantley sent back from [facility] 11/4/2022 with fever   Pulm crit care consulted 11/4/2022    .. VAP   11/4/2022 levaquin     .. Resp distress  11/4 v duplx (-)   On vent fio2 40         TIME SPENT   Over 39 minutes aggregate critical care time spent on encounter; activities included   direct patient care, counseling and/or coordinating care reviewing notes, lab data/ imaging , discussion with multidisciplinary team/ patient  /family and explaining in detail risks, benefits, alternatives  of the recommendations     CHAPINCITO GUIDRY 76 f [facility] S 11/4/2022   DR HIMANSHU RANDHAWA

## 2022-11-06 NOTE — PROGRESS NOTE ADULT - ASSESSMENT
76 year old female with PMHx of dementia, COPD with chronic respiratory failure s/p trach, cardiac arrest, CHH, quadriplegia, CVA, CKD, anemia, PEG tube, and multiple hospital admissions for respiratory distress presents to the ED BIBEMS from [facility] complaining of fever,    sepsis  chr resp failure  dementia  copd  atelectasis  dysphagia  peg  trach  cva  ckd  anemia    GOC documented  pt is full code   Marciano - HCP    curr NPO  on Levaquin  TLC placed  overnight events noted  vs noted  labs reviewed

## 2022-11-06 NOTE — PROGRESS NOTE ADULT - SUBJECTIVE AND OBJECTIVE BOX
CC:  Patient is a 76y old  Female who presents with a chief complaint of Per H&P "76 year old female with PMHx of dementia, COPD with chronic respiratory failure s/p trach, cardiac arrest, CHH, quadriplegia, CVA, CKD, anemia, PEG tube, and multiple hospital admissions for respiratory distress a/w sepsis and acute on chronic hypoxic respiratory failure due to suspected recurrent VAP."      (05 Nov 2022 14:36)      HPI/BRIEF HOSPITAL COURSE:   76 year old female with PMHx of dementia, COPD with chronic respiratory failure s/p trach, cardiac arrest, CHH, quadriplegia, CVA with aphasia, CKD, anemia, PEG tube, and multiple hospital admissions for respiratory distress presents to the ED BIBEMS from [facility] complaining of fever, SOB, and dark sputum output from trach. Pt was discharged 2 days ago after being admitted for respiratory distress and pneumonia.     11/5/22  Remains vented on 16/400/5/50%. Trach aspirate grows gram + rods consistent with previous cultures. No other events over night.     11/6/22: Remains on 16/400/5/50%. Started tube feeds. No other acute events    PAST MEDICAL & SURGICAL HISTORY:  Dementia of frontal lobe type      Aphasic stroke      Diabetes mellitus      Respiratory failure      Hypertension      GERD (gastroesophageal reflux disease)      Constipation      Respiratory failure      CVA (cerebral vascular accident)      HTN (hypertension)      DM (diabetes mellitus)      Advanced dementia      COVID-19 virus detected      Quadriplegia      Pneumonia      Type II diabetes mellitus      Hx of appendectomy      Gastrostomy in place      Tracheostomy in place      Tracheostomy tube present      Feeding by G-tube        Allergies    codeine (Hives)    Intolerances      FAMILY HISTORY:  No pertinent family history in first degree relatives        Review of Systems:  Negative 2/2 to cognition     Medications:  levoFLOXacin IVPB 750 milliGRAM(s) IV Intermittent every 24 hours    midodrine. 10 milliGRAM(s) Oral three times a day      acetaminophen     Tablet .. 650 milliGRAM(s) Oral every 6 hours PRN  LORazepam     Tablet 1 milliGRAM(s) Oral four times a day  LORazepam   Injectable 2 milliGRAM(s) IV Push every 4 hours PRN  ondansetron Injectable 4 milliGRAM(s) IV Push every 8 hours PRN      heparin   Injectable 5000 Unit(s) SubCutaneous every 12 hours    bisacodyl Suppository 10 milliGRAM(s) Rectal daily PRN  pantoprazole    Tablet 40 milliGRAM(s) Oral before breakfast  polyethylene glycol 3350 17 Gram(s) Oral daily  senna 2 Tablet(s) Oral at bedtime  simethicone 80 milliGRAM(s) Chew every 6 hours      dextrose 50% Injectable 25 Gram(s) IV Push once  dextrose 50% Injectable 12.5 Gram(s) IV Push once  dextrose 50% Injectable 25 Gram(s) IV Push once  dextrose Oral Gel 15 Gram(s) Oral once PRN  glucagon  Injectable 1 milliGRAM(s) IntraMuscular once  insulin lispro (ADMELOG) corrective regimen sliding scale   SubCutaneous every 6 hours    dextrose 5%. 1000 milliLiter(s) IV Continuous <Continuous>  dextrose 5%. 1000 milliLiter(s) IV Continuous <Continuous>      chlorhexidine 0.12% Liquid 15 milliLiter(s) Oral Mucosa every 12 hours  chlorhexidine 2% Cloths 1 Application(s) Topical daily    lactobacillus acidophilus 1 Tablet(s) Oral daily      Mode: AC/ CMV (Assist Control/ Continuous Mandatory Ventilation)  RR (machine): 16  TV (machine): 400  FiO2: 50  PEEP: 5  ITime: 1  MAP: 12  PIP: 35      ICU Vital Signs Last 24 Hrs  T(C): 37.4 (06 Nov 2022 18:00), Max: 37.7 (06 Nov 2022 00:02)  T(F): 99.4 (06 Nov 2022 18:00), Max: 99.8 (06 Nov 2022 00:02)  HR: 69 (06 Nov 2022 19:05) (63 - 92)  BP: 108/65 (06 Nov 2022 18:00) (98/46 - 138/66)  BP(mean): 80 (06 Nov 2022 18:00) (62 - 96)  ABP: --  ABP(mean): --  RR: 16 (06 Nov 2022 18:00) (14 - 35)  SpO2: 100% (06 Nov 2022 19:05) (98% - 100%)    O2 Parameters below as of 06 Nov 2022 08:00  Patient On (Oxygen Delivery Method): ventilator    O2 Concentration (%): 50      Vital Signs Last 24 Hrs  T(C): 37.4 (06 Nov 2022 18:00), Max: 37.7 (06 Nov 2022 00:02)  T(F): 99.4 (06 Nov 2022 18:00), Max: 99.8 (06 Nov 2022 00:02)  HR: 69 (06 Nov 2022 19:05) (63 - 92)  BP: 108/65 (06 Nov 2022 18:00) (98/46 - 138/66)  BP(mean): 80 (06 Nov 2022 18:00) (62 - 96)  RR: 16 (06 Nov 2022 18:00) (14 - 35)  SpO2: 100% (06 Nov 2022 19:05) (98% - 100%)    Parameters below as of 06 Nov 2022 08:00  Patient On (Oxygen Delivery Method): ventilator    O2 Concentration (%): 50        I&O's Detail    05 Nov 2022 08:01  -  06 Nov 2022 07:00  --------------------------------------------------------  IN:    IV PiggyBack: 100 mL    Sodium Chloride 0.9% Bolus: 1000 mL  Total IN: 1100 mL    OUT:    Voided (mL): 300 mL  Total OUT: 300 mL    Total NET: 800 mL            LABS:                        10.2   10.04 )-----------( 180      ( 06 Nov 2022 06:27 )             34.7     11-06    136  |  103  |  15  ----------------------------<  98  4.2   |  25  |  0.93    Ca    8.3<L>      06 Nov 2022 06:27  Phos  2.8     11-05    TPro  7.2  /  Alb  1.7<L>  /  TBili  0.8  /  DBili  x   /  AST  15  /  ALT  <10<L>  /  AlkPhos  120  11-06          CAPILLARY BLOOD GLUCOSE      POCT Blood Glucose.: 111 mg/dL (06 Nov 2022 17:58)    PT/INR - ( 06 Nov 2022 06:27 )   PT: 15.4 sec;   INR: 1.34 ratio             CULTURES:  Culture Results:   Numerous Stenotrophomonas maltophilia (11-05 @ 06:39)  Culture Results:   >100,000 CFU/ml Enterococcus species  50,000 - 99,000 CFU/mL Candida albicans "Susceptibilities not performed" (11-04 @ 13:54)  Culture Results:   No growth to date. (11-04 @ 13:52)  Culture Results:   No growth to date. (11-04 @ 13:52)    levoFLOXacin IVPB 750 milliGRAM(s) IV Intermittent every 24 hours          Physical Examination:  General: Trach to vent. Non-verbal.   NEURO: Awake, tracks with eyes. CN2-12 intact.   HEENT: Pupils equal, reactive to light.  Symmetric.  PULM: Diminshed B/l. Thick secretions when suctioning.  no wheezes, rales, rhonchi  CVS: Regular rate and rhythm, no murmurs, rubs, or gallops  ABD: Soft, nondistended, nontender, normoactive bowel sounds, no masses  EXT: No edema, nontender  SKIN: Warm and well perfused, no rashes noted        RADIOLOGY:   < from: US Duplex Venous Lower Ext Complete, Bilateral (11.04.22 @ 21:59) >  LEFT:  Normal compressibility of the LEFT common femoral, femoral and popliteal   veins.  Doppler examination shows normal spontaneous and phasic flow.  No LEFT calf vein thrombosis is detected.    IMPRESSION:  No evidence of deep venous thrombosis in either lower extremity.    --- End of Report ---    < end of copied text >    < from: Xray Chest 1 View- PORTABLE-Urgent (11.04.22 @ 13:51) >  There are persistent advanced bilateral infiltrates a small basilar   effusions right greater than left.    Lungs are rather unchanged compared to October 19 this year.    IMPRESSION: As above.    --- End of Report ---      < end of copied text >

## 2022-11-06 NOTE — PROGRESS NOTE ADULT - SUBJECTIVE AND OBJECTIVE BOX
Patient is a 76y old  Female who presents with a chief complaint of Per H&P "76 year old female with PMHx of dementia, COPD with chronic respiratory failure s/p trach, cardiac arrest, CHH, quadriplegia, CVA, CKD, anemia, PEG tube, and multiple hospital admissions for respiratory distress a/w sepsis and acute on chronic hypoxic respiratory failure due to suspected recurrent VAP."      (2022 14:36)      INTERVAL HPI/OVERNIGHT EVENTS:    pt back on 50% FIO2, yesterday was 100%.    Per RN, unknown if pt had a BM recently, belly seems distended.  Ordered dulcolax suppository since pt is already on miralax    MEDICATIONS  (STANDING):  chlorhexidine 0.12% Liquid 15 milliLiter(s) Oral Mucosa every 12 hours  chlorhexidine 2% Cloths 1 Application(s) Topical daily  dextrose 5%. 1000 milliLiter(s) (100 mL/Hr) IV Continuous <Continuous>  dextrose 5%. 1000 milliLiter(s) (50 mL/Hr) IV Continuous <Continuous>  dextrose 50% Injectable 25 Gram(s) IV Push once  dextrose 50% Injectable 12.5 Gram(s) IV Push once  dextrose 50% Injectable 25 Gram(s) IV Push once  glucagon  Injectable 1 milliGRAM(s) IntraMuscular once  heparin   Injectable 5000 Unit(s) SubCutaneous every 12 hours  insulin lispro (ADMELOG) corrective regimen sliding scale   SubCutaneous every 6 hours  lactobacillus acidophilus 1 Tablet(s) Oral daily  levoFLOXacin IVPB 750 milliGRAM(s) IV Intermittent every 24 hours  LORazepam     Tablet 1 milliGRAM(s) Oral four times a day  midodrine. 10 milliGRAM(s) Oral three times a day  pantoprazole    Tablet 40 milliGRAM(s) Oral before breakfast  polyethylene glycol 3350 17 Gram(s) Oral daily  senna 2 Tablet(s) Oral at bedtime  simethicone 80 milliGRAM(s) Chew every 6 hours    MEDICATIONS  (PRN):  acetaminophen     Tablet .. 650 milliGRAM(s) Oral every 6 hours PRN Temp greater or equal to 38C (100.4F), Mild Pain (1 - 3)  bisacodyl Suppository 10 milliGRAM(s) Rectal daily PRN Constipation  dextrose Oral Gel 15 Gram(s) Oral once PRN Blood Glucose LESS THAN 70 milliGRAM(s)/deciliter  LORazepam   Injectable 2 milliGRAM(s) IV Push every 4 hours PRN Agitation  ondansetron Injectable 4 milliGRAM(s) IV Push every 8 hours PRN Nausea and/or Vomiting      Allergies    codeine (Hives)    Intolerances        REVIEW OF SYSTEMS:  as above    Vital Signs Last 24 Hrs  T(C): 37.1 (2022 04:02), Max: 37.7 (2022 00:02)  T(F): 98.7 (2022 04:02), Max: 99.8 (2022 00:02)  HR: 92 (2022 08:00) (62 - 92)  BP: 138/66 (2022 08:00) (92/48 - 155/99)  BP(mean): 83 (2022 08:00) (62 - 113)  RR: 25 (2022 08:00) (14 - 42)  SpO2: 100% (2022 08:00) (95% - 100%)    Parameters below as of 2022 08:00  Patient On (Oxygen Delivery Method): ventilator    O2 Concentration (%): 50    PHYSICAL EXAM:  GENERAL: NAD, Non Verbal  HEAD:  Atraumatic, Normocephalic  ENMT: Trach to Vent   NECK: Supple, No JVD, Normal thyroid  CHEST/LUNG: Clear to auscultation bilaterally; No rales, rhonchi, wheezing, or rubs  HEART: Regular rate and rhythm; No murmurs, rubs, or gallops  ABDOMEN: Soft, Nontender, PEG Tube +   EXTREMITIES:  2+ Peripheral Pulses, No clubbing, cyanosis, or edema    LABS:                        10.2   10.04 )-----------( 180      ( 2022 06:27 )             34.7     2022 06:27    136    |  103    |  15     ----------------------------<  98     4.2     |  25     |  0.93     Ca    8.3        2022 06:27    TPro  7.2    /  Alb  1.7    /  TBili  0.8    /  DBili  x      /  AST  15     /  ALT  <10    /  AlkPhos  120    2022 06:27    PT/INR - ( 2022 06:27 )   PT: 15.4 sec;   INR: 1.34 ratio         PTT - ( 2022 13:48 )  PTT:41.1 sec  Urinalysis Basic - ( 2022 13:48 )    Color: Yellow / Appearance: Clear / S.015 / pH: x  Gluc: x / Ketone: Negative  / Bili: Negative / Urobili: Negative mg/dL   Blood: x / Protein: 100 mg/dL / Nitrite: Negative   Leuk Esterase: Moderate / RBC: 0-2 /HPF / WBC 11-25   Sq Epi: x / Non Sq Epi: Occasional / Bacteria: Moderate      CAPILLARY BLOOD GLUCOSE      POCT Blood Glucose.: 97 mg/dL (2022 05:58)  POCT Blood Glucose.: 105 mg/dL (2022 23:38)  POCT Blood Glucose.: 89 mg/dL (2022 18:08)  POCT Blood Glucose.: 106 mg/dL (2022 11:53)      RADIOLOGY & ADDITIONAL TESTS:     05-Feb-2019 02:00

## 2022-11-06 NOTE — PROGRESS NOTE ADULT - ASSESSMENT
76 year old female with PMHx of dementia, COPD with chronic respiratory failure s/p trach, cardiac arrest, CHH, quadriplegia, CVA, CKD, anemia, PEG tube, and multiple hospital admissions for respiratory distress presents to the ED BIBEMS from [facility] for fever, SOB, and dark sputum output from trach.     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 Please call with any questions.   Andressa Brantley M.D.  [facility] Division of Infectious Diseases [phone]

## 2022-11-06 NOTE — PROGRESS NOTE ADULT - SUBJECTIVE AND OBJECTIVE BOX
BREA BECKHAM     [facility]    Allergies    codeine (Hives)    Intolerances        PAST MEDICAL & SURGICAL HISTORY:  Dementia of frontal lobe type      Aphasic stroke      Diabetes mellitus      Respiratory failure      Hypertension      GERD (gastroesophageal reflux disease)      Constipation      Respiratory failure      CVA (cerebral vascular accident)      HTN (hypertension)      DM (diabetes mellitus)      Advanced dementia      COVID-19 virus detected      Quadriplegia      Pneumonia      Type II diabetes mellitus      Hx of appendectomy      Gastrostomy in place      Tracheostomy in place      Tracheostomy tube present      Feeding by G-tube          FAMILY HISTORY:  No pertinent family history in first degree relatives        Home Medications:  albuterol 90 mcg/inh inhalation aerosol with adapter: 2  inhaled every 6 hours (18 Oct 2022 11:42)  Bacid (LAC) oral tablet: 2 tab(s) by gastrostomy tube once a day (18 Oct 2022 11:42)  Betadine 10% topical swab: cleanse right and left great toes (18 Oct 2022 11:42)  chlorhexidine 0.12% mucous membrane liquid: 15 milliliter(s) mucous membrane 2 times a day (18 Oct 2022 11:42)  Eucerin topical cream: Apply topically to affected area once a day bilateral feet (18 Oct 2022 11:42)  insulin glargine 100 units/mL subcutaneous solution: 8 unit(s) subcutaneous once a day (in the morning) (18 Oct 2022 11:42)  ipratropium-albuterol 0.5 mg-2.5 mg/3 mL inhalation solution: 3 milliliter(s) inhaled 4 times a day (18 Oct 2022 11:42)  LORazepam 1 mg oral tablet: 1 tab(s) by gastrostomy tube every 4 hours (18 Oct 2022 11:42)  midodrine 10 mg oral tablet: 1 tab(s) orally every 8 hours (2022 11:44)  MiraLax oral powder for reconstitution: g-tube (18 Oct 2022 13:04)  Multiple Vitamins oral tablet: 1 tab(s) orally once a day (18 Oct 2022 11:42)  nystatin 100,000 units/g topical powder: 1 application topically 3 times a day (18 Oct 2022 11:42)  omeprazole 20 mg oral delayed release capsule: orally 2 times a day (18 Oct 2022 11:42)  polyethylene glycol 3350 oral powder for reconstitution: 17 gram(s) by gastrostomy tube every 12 hours (18 Oct 2022 11:42)  senna 8.6 mg oral tablet: 3 tab(s) by gastrostomy tube once a day (at bedtime) (18 Oct 2022 11:42)  simethicone 80 mg oral tablet, chewable: 1 tab(s) by gastrostomy tube every 6 hours (18 Oct 2022 11:42)  sucralfate 1 g/10 mL oral suspension: 10 milliliter(s) g-tube 4 times a day (before meals and at bedtime) (18 Oct 2022 13:04)  Tylenol 325 mg oral tablet: 2 tab(s) by gastrostomy tube once a day; 60 minutes prior to dressing change  (18 Oct 2022 11:42)  Tylenol 325 mg oral tablet: 2 tab(s) by gastrostomy tube every 6 hours, As Needed (18 Oct 2022 11:42)      MEDICATIONS  (STANDING):  chlorhexidine 0.12% Liquid 15 milliLiter(s) Oral Mucosa every 12 hours  chlorhexidine 2% Cloths 1 Application(s) Topical daily  dextrose 5%. 1000 milliLiter(s) (50 mL/Hr) IV Continuous <Continuous>  dextrose 5%. 1000 milliLiter(s) (100 mL/Hr) IV Continuous <Continuous>  dextrose 50% Injectable 25 Gram(s) IV Push once  dextrose 50% Injectable 12.5 Gram(s) IV Push once  dextrose 50% Injectable 25 Gram(s) IV Push once  glucagon  Injectable 1 milliGRAM(s) IntraMuscular once  heparin   Injectable 5000 Unit(s) SubCutaneous every 12 hours  insulin lispro (ADMELOG) corrective regimen sliding scale   SubCutaneous every 6 hours  lactobacillus acidophilus 1 Tablet(s) Oral daily  levoFLOXacin IVPB 750 milliGRAM(s) IV Intermittent every 24 hours  LORazepam     Tablet 1 milliGRAM(s) Oral four times a day  midodrine. 10 milliGRAM(s) Oral three times a day  pantoprazole    Tablet 40 milliGRAM(s) Oral before breakfast  polyethylene glycol 3350 17 Gram(s) Oral daily  senna 2 Tablet(s) Oral at bedtime  simethicone 80 milliGRAM(s) Chew every 6 hours    MEDICATIONS  (PRN):  acetaminophen     Tablet .. 650 milliGRAM(s) Oral every 6 hours PRN Temp greater or equal to 38C (100.4F), Mild Pain (1 - 3)  bisacodyl Suppository 10 milliGRAM(s) Rectal daily PRN Constipation  dextrose Oral Gel 15 Gram(s) Oral once PRN Blood Glucose LESS THAN 70 milliGRAM(s)/deciliter  LORazepam   Injectable 2 milliGRAM(s) IV Push every 4 hours PRN Agitation  ondansetron Injectable 4 milliGRAM(s) IV Push every 8 hours PRN Nausea and/or Vomiting      Diet, NPO with Tube Feed:   Tube Feeding Modality: Gastrostomy  Pulmocare  Total Volume for 24 Hours (mL): 840  Continuous  Starting Tube Feed Rate mL per Hour: 20  Increase Tube Feed Rate by (mL): 10     Every 8 hours  Until Goal Tube Feed Rate (mL per Hour): 35  Tube Feed Duration (in Hours): 24  Tube Feed Start Time: 12:00  Free Water Flush  Bolus   Total Volume per Flush (mL): 250   Frequency: Every 6 Hours  Lucas(7 Gm Arginine/7 Gm Glut/1.2 Gm HMB     Qty per Day:  1  No Carb Prosource TF     Qty per Day:  1 (22 @ 15:04) [Pending Verification By Attending]          Vital Signs Last 24 Hrs  T(C): 37.1 (2022 04:02), Max: 37.7 (2022 00:02)  T(F): 98.7 (2022 04:02), Max: 99.8 (2022 00:02)  HR: 92 (2022 08:00) (62 - 92)  BP: 138/66 (2022 08:00) (93/56 - 155/99)  BP(mean): 83 (2022 08:00) (68 - 113)  RR: 25 (2022 08:00) (14 - 42)  SpO2: 100% (2022 08:00) (95% - 100%)    Parameters below as of 2022 08:00  Patient On (Oxygen Delivery Method): ventilator    O2 Concentration (%): 50      22 @ 08:01  -  22 @ 07:00  --------------------------------------------------------  IN: 1100 mL / OUT: 300 mL / NET: 800 mL        Mode: AC/ CMV (Assist Control/ Continuous Mandatory Ventilation), RR (machine): 16, TV (machine): 400, FiO2: 60, PEEP: 5, ITime: 1, MAP: 16, PIP: 44      LABS:                        10.2   10.04 )-----------( 180      ( 2022 06:27 )             34.7         136  |  103  |  15  ----------------------------<  98  4.2   |  25  |  0.93    Ca    8.3<L>      2022 06:27  Phos  2.8         TPro  7.2  /  Alb  1.7<L>  /  TBili  0.8  /  DBili  x   /  AST  15  /  ALT  <10<L>  /  AlkPhos  120      PT/INR - ( 2022 06:27 )   PT: 15.4 sec;   INR: 1.34 ratio         PTT - ( 2022 13:48 )  PTT:41.1 sec  Urinalysis Basic - ( 2022 13:48 )    Color: Yellow / Appearance: Clear / S.015 / pH: x  Gluc: x / Ketone: Negative  / Bili: Negative / Urobili: Negative mg/dL   Blood: x / Protein: 100 mg/dL / Nitrite: Negative   Leuk Esterase: Moderate / RBC: 0-2 /HPF / WBC 11-25   Sq Epi: x / Non Sq Epi: Occasional / Bacteria: Moderate            WBC:  WBC Count: 10.04 K/uL ( @ 06:27)  WBC Count: 9.13 K/uL ( @ 06:34)  WBC Count: 10.99 K/uL ( @ 13:48)      MICROBIOLOGY:  RECENT CULTURES:   Trach Asp Tracheal Aspirate XXXX   Few Squamous epithelial cells per low power field  Few polymorphonuclear leukocytes per low power field  Rare Gram Positive Rods per oil power field  Rare Gram Negative Rods per oil power field XXXX     .Blood Blood-Peripheral XXXX XXXX   No growth to date.                PT/INR - ( 2022 06:27 )   PT: 15.4 sec;   INR: 1.34 ratio         PTT - ( 2022 13:48 )  PTT:41.1 sec    Sodium:  Sodium, Serum: 136 mmol/L ( @ 06:27)  Sodium, Serum: 138 mmol/L ( @ 06:34)  Sodium, Serum: 138 mmol/L (:48)      0.93 mg/dL :27  0.91 mg/dL :34  0.94 mg/dL :48      Hemoglobin:  Hemoglobin: 10.2 g/dL ( @ 06:27)  Hemoglobin: 9.4 g/dL ( @ 06:34)  Hemoglobin: 10.5 g/dL (:48)      Platelets: Platelet Count - Automated: 180 K/uL ( @ :27)  Platelet Count - Automated: 184 K/uL ( @ 06:34)  Platelet Count - Automated: 184 K/uL (:48)      LIVER FUNCTIONS - ( 2022 06:27 )  Alb: 1.7 g/dL / Pro: 7.2 g/dL / ALK PHOS: 120 U/L / ALT: <10 U/L DA / AST: 15 U/L / GGT: x             Urinalysis Basic - ( 2022 13:48 )    Color: Yellow / Appearance: Clear / S.015 / pH: x  Gluc: x / Ketone: Negative  / Bili: Negative / Urobili: Negative mg/dL   Blood: x / Protein: 100 mg/dL / Nitrite: Negative   Leuk Esterase: Moderate / RBC: 0-2 /HPF / WBC 11-25   Sq Epi: x / Non Sq Epi: Occasional / Bacteria: Moderate        RADIOLOGY & ADDITIONAL STUDIES:      MICROBIOLOGY:  RECENT CULTURES:   Trach Asp Tracheal Aspirate XXXX   Few Squamous epithelial cells per low power field  Few polymorphonuclear leukocytes per low power field  Rare Gram Positive Rods per oil power field  Rare Gram Negative Rods per oil power field XXXX     .Blood Blood-Peripheral XXXX XXXX   No growth to date.

## 2022-11-06 NOTE — PROGRESS NOTE ADULT - SUBJECTIVE AND OBJECTIVE BOX
Date/Time Patient Seen:  		  Referring MD:   Data Reviewed	       Patient is a 76y old  Female who presents with a chief complaint of Per H&P "76 year old female with PMHx of dementia, COPD with chronic respiratory failure s/p trach, cardiac arrest, CHH, quadriplegia, CVA, CKD, anemia, PEG tube, and multiple hospital admissions for respiratory distress a/w sepsis and acute on chronic hypoxic respiratory failure due to suspected recurrent VAP."      (05 Nov 2022 14:36)      Subjective/HPI     PAST MEDICAL & SURGICAL HISTORY:  Dementia of frontal lobe type    Aphasic stroke    Diabetes mellitus    Respiratory failure    Hypertension    GERD (gastroesophageal reflux disease)    Constipation    Respiratory failure    CVA (cerebral vascular accident)    HTN (hypertension)    DM (diabetes mellitus)    Advanced dementia    COVID-19 virus detected    Quadriplegia    Pneumonia    Type II diabetes mellitus    Hx of appendectomy    Gastrostomy in place    Tracheostomy in place    Tracheostomy tube present    Feeding by G-tube          Medication list         MEDICATIONS  (STANDING):  chlorhexidine 0.12% Liquid 15 milliLiter(s) Oral Mucosa every 12 hours  chlorhexidine 2% Cloths 1 Application(s) Topical daily  dextrose 5%. 1000 milliLiter(s) (50 mL/Hr) IV Continuous <Continuous>  dextrose 5%. 1000 milliLiter(s) (100 mL/Hr) IV Continuous <Continuous>  dextrose 50% Injectable 25 Gram(s) IV Push once  dextrose 50% Injectable 12.5 Gram(s) IV Push once  dextrose 50% Injectable 25 Gram(s) IV Push once  glucagon  Injectable 1 milliGRAM(s) IntraMuscular once  heparin   Injectable 5000 Unit(s) SubCutaneous every 12 hours  insulin lispro (ADMELOG) corrective regimen sliding scale   SubCutaneous every 6 hours  lactobacillus acidophilus 1 Tablet(s) Oral daily  levoFLOXacin IVPB 750 milliGRAM(s) IV Intermittent every 24 hours  LORazepam     Tablet 1 milliGRAM(s) Oral four times a day  midodrine. 10 milliGRAM(s) Oral three times a day  pantoprazole    Tablet 40 milliGRAM(s) Oral before breakfast  polyethylene glycol 3350 17 Gram(s) Oral daily  senna 2 Tablet(s) Oral at bedtime  simethicone 80 milliGRAM(s) Chew every 6 hours    MEDICATIONS  (PRN):  acetaminophen     Tablet .. 650 milliGRAM(s) Oral every 6 hours PRN Temp greater or equal to 38C (100.4F), Mild Pain (1 - 3)  dextrose Oral Gel 15 Gram(s) Oral once PRN Blood Glucose LESS THAN 70 milliGRAM(s)/deciliter  LORazepam   Injectable 2 milliGRAM(s) IV Push every 4 hours PRN Agitation  ondansetron Injectable 4 milliGRAM(s) IV Push every 8 hours PRN Nausea and/or Vomiting         Vitals log        ICU Vital Signs Last 24 Hrs  T(C): 37.1 (06 Nov 2022 04:02), Max: 37.7 (06 Nov 2022 00:02)  T(F): 98.7 (06 Nov 2022 04:02), Max: 99.8 (06 Nov 2022 00:02)  HR: 91 (06 Nov 2022 06:00) (62 - 93)  BP: 134/76 (06 Nov 2022 06:00) (86/36 - 155/99)  BP(mean): 96 (06 Nov 2022 06:00) (51 - 113)  ABP: --  ABP(mean): --  RR: 26 (06 Nov 2022 06:00) (14 - 42)  SpO2: 100% (06 Nov 2022 06:00) (95% - 100%)    O2 Parameters below as of 06 Nov 2022 06:00  Patient On (Oxygen Delivery Method): ventilator    O2 Concentration (%): 50         Mode: AC/ CMV (Assist Control/ Continuous Mandatory Ventilation)  RR (machine): 16  TV (machine): 400  FiO2: 50  PEEP: 5  ITime: 1  MAP: 12  PIP: 31      Input and Output:  I&O's Detail    04 Nov 2022 07:01  -  05 Nov 2022 07:00  --------------------------------------------------------  IN:    IV PiggyBack: 400 mL  Total IN: 400 mL    OUT:    Voided (mL): 250 mL  Total OUT: 250 mL    Total NET: 150 mL      05 Nov 2022 08:01  -  06 Nov 2022 06:25  --------------------------------------------------------  IN:    IV PiggyBack: 100 mL    Sodium Chloride 0.9% Bolus: 1000 mL  Total IN: 1100 mL    OUT:    Voided (mL): 300 mL  Total OUT: 300 mL    Total NET: 800 mL          Lab Data                        9.4    9.13  )-----------( 184      ( 05 Nov 2022 06:34 )             31.7     11-05    138  |  104  |  19  ----------------------------<  97  4.7   |  28  |  0.91    Ca    8.3<L>      05 Nov 2022 06:34  Phos  2.8     11-05    TPro  7.2  /  Alb  1.8<L>  /  TBili  0.6  /  DBili  x   /  AST  18  /  ALT  15  /  AlkPhos  139<H>  11-05            Review of Systems	      Objective     Physical Examination    heart s1s2  lung dec bS  head nc      Pertinent Lab findings & Imaging      Annalise:  NO   Adequate UO     I&O's Detail    04 Nov 2022 07:01  -  05 Nov 2022 07:00  --------------------------------------------------------  IN:    IV PiggyBack: 400 mL  Total IN: 400 mL    OUT:    Voided (mL): 250 mL  Total OUT: 250 mL    Total NET: 150 mL      05 Nov 2022 08:01  -  06 Nov 2022 06:25  --------------------------------------------------------  IN:    IV PiggyBack: 100 mL    Sodium Chloride 0.9% Bolus: 1000 mL  Total IN: 1100 mL    OUT:    Voided (mL): 300 mL  Total OUT: 300 mL    Total NET: 800 mL               Discussed with:     Cultures:	        Radiology

## 2022-11-06 NOTE — PROGRESS NOTE ADULT - SUBJECTIVE AND OBJECTIVE BOX
[facility], Division of Infectious Diseases  MOIZ Lora Y. Patel, S. Shah, G. [facility]  [phone]    Name: BREA BECKHAM  Age: 76y  Gender: Female  MRN: [id number]    Interval History:  Patient seen and examined at bedside  No acute overnight events. Afebrile  Notes reviewed    Antibiotics:  levoFLOXacin IVPB 750 milliGRAM(s) IV Intermittent every 24 hours      Medications:  acetaminophen     Tablet .. 650 milliGRAM(s) Oral every 6 hours PRN  chlorhexidine 0.12% Liquid 15 milliLiter(s) Oral Mucosa every 12 hours  chlorhexidine 2% Cloths 1 Application(s) Topical daily  dextrose 5%. 1000 milliLiter(s) IV Continuous <Continuous>  dextrose 5%. 1000 milliLiter(s) IV Continuous <Continuous>  dextrose 50% Injectable 25 Gram(s) IV Push once  dextrose 50% Injectable 12.5 Gram(s) IV Push once  dextrose 50% Injectable 25 Gram(s) IV Push once  dextrose Oral Gel 15 Gram(s) Oral once PRN  glucagon  Injectable 1 milliGRAM(s) IntraMuscular once  heparin   Injectable 5000 Unit(s) SubCutaneous every 12 hours  insulin lispro (ADMELOG) corrective regimen sliding scale   SubCutaneous every 6 hours  lactobacillus acidophilus 1 Tablet(s) Oral daily  levoFLOXacin IVPB 750 milliGRAM(s) IV Intermittent every 24 hours  LORazepam     Tablet 1 milliGRAM(s) Oral four times a day  LORazepam   Injectable 2 milliGRAM(s) IV Push every 4 hours PRN  midodrine. 10 milliGRAM(s) Oral three times a day  ondansetron Injectable 4 milliGRAM(s) IV Push every 8 hours PRN  pantoprazole    Tablet 40 milliGRAM(s) Oral before breakfast  polyethylene glycol 3350 17 Gram(s) Oral daily  senna 2 Tablet(s) Oral at bedtime  simethicone 80 milliGRAM(s) Chew every 6 hours      Review of Systems:  unable to obtain    Allergies: codeine (Hives)    For details regarding the patient's past medical history, social history, family history, and other miscellaneous elements, please refer the initial infectious diseases consultation and/or the admitting history and physical examination for this admission.    Objective:  Vital Signs Last 24 Hrs  T(C): 36.7 (06 Nov 2022 12:30), Max: 37.7 (06 Nov 2022 00:02)  T(F): 98.1 (06 Nov 2022 12:30), Max: 99.8 (06 Nov 2022 00:02)  HR: 65 (06 Nov 2022 16:00) (65 - 92)  BP: 98/55 (06 Nov 2022 16:00) (98/46 - 142/64)  BP(mean): 69 (06 Nov 2022 16:00) (62 - 96)  RR: 16 (06 Nov 2022 16:00) (14 - 35)  SpO2: 100% (06 Nov 2022 16:00) (97% - 100%)    Parameters below as of 06 Nov 2022 08:00  Patient On (Oxygen Delivery Method): ventilator    O2 Concentration (%): 50    Physical Examination:  General: chronically ill appearing   Neck: trach  HEENT: NC/AT  Lungs: MV breath sounds  Heart: Regular rate and rhythm. No murmur, rub or gallop.  Abdomen: Soft. Nondistended. Nontender. PGT in place  Skin: Warm. Dry. Good turgor.        Laboratory Studies:                        10.2   10.04 )-----------( 180      ( 06 Nov 2022 06:27 )             34.7     11-06    136  |  103  |  15  ----------------------------<  98  4.2   |  25  |  0.93    Ca    8.3<L>      06 Nov 2022 06:27  Phos  2.8     11-05    TPro  7.2  /  Alb  1.7<L>  /  TBili  0.8  /  DBili  x   /  AST  15  /  ALT  <10<L>  /  AlkPhos  120  11-06    Microbiology: reviewed      Culture - Sputum (collected 05 Nov 2022 06:39)  Source: Trach Asp Tracheal Aspirate  Gram Stain (05 Nov 2022 14:45):    Few Squamous epithelial cells per low power field    Few polymorphonuclear leukocytes per low power field    Rare Gram Positive Rods per oil power field    Rare Gram Negative Rods per oil power field  Preliminary Report (06 Nov 2022 11:19):    Numerous Stenotrophomonas maltophilia    Culture - Urine (collected 04 Nov 2022 13:54)  Source: Clean Catch Clean Catch (Midstream)  Preliminary Report (06 Nov 2022 10:52):    >100,000 CFU/ml Enterococcus species    Culture - Blood (collected 04 Nov 2022 13:52)  Source: .Blood Blood-Peripheral  Preliminary Report (05 Nov 2022 20:02):    No growth to date.    Culture - Blood (collected 04 Nov 2022 13:52)  Source: .Blood Blood-Peripheral  Preliminary Report (05 Nov 2022 20:02):    No growth to date.              Radiology: reviewed

## 2022-11-07 DIAGNOSIS — E11.621 TYPE 2 DIABETES MELLITUS WITH FOOT ULCER: ICD-10-CM

## 2022-11-07 LAB
-  AMPICILLIN: SIGNIFICANT CHANGE UP
-  CIPROFLOXACIN: SIGNIFICANT CHANGE UP
-  DAPTOMYCIN: SIGNIFICANT CHANGE UP
-  LEVOFLOXACIN: SIGNIFICANT CHANGE UP
-  LINEZOLID: SIGNIFICANT CHANGE UP
-  NITROFURANTOIN: SIGNIFICANT CHANGE UP
-  TETRACYCLINE: SIGNIFICANT CHANGE UP
-  VANCOMYCIN: SIGNIFICANT CHANGE UP
ALBUMIN SERPL ELPH-MCNC: 1.5 G/DL — LOW (ref 3.3–5)
ALP SERPL-CCNC: 108 U/L — SIGNIFICANT CHANGE UP (ref 30–120)
ALT FLD-CCNC: <10 U/L DA — LOW (ref 10–60)
ANION GAP SERPL CALC-SCNC: 7 MMOL/L — SIGNIFICANT CHANGE UP (ref 5–17)
AST SERPL-CCNC: 15 U/L — SIGNIFICANT CHANGE UP (ref 10–40)
BILIRUB SERPL-MCNC: 0.7 MG/DL — SIGNIFICANT CHANGE UP (ref 0.2–1.2)
BUN SERPL-MCNC: 16 MG/DL — SIGNIFICANT CHANGE UP (ref 7–23)
CALCIUM SERPL-MCNC: 8 MG/DL — LOW (ref 8.4–10.5)
CHLORIDE SERPL-SCNC: 102 MMOL/L — SIGNIFICANT CHANGE UP (ref 96–108)
CO2 SERPL-SCNC: 26 MMOL/L — SIGNIFICANT CHANGE UP (ref 22–31)
CREAT SERPL-MCNC: 1.12 MG/DL — SIGNIFICANT CHANGE UP (ref 0.5–1.3)
CULTURE RESULTS: SIGNIFICANT CHANGE UP
EGFR: 50 ML/MIN/1.73M2 — LOW
GLUCOSE BLDC GLUCOMTR-MCNC: 121 MG/DL — HIGH (ref 70–99)
GLUCOSE BLDC GLUCOMTR-MCNC: 156 MG/DL — HIGH (ref 70–99)
GLUCOSE BLDC GLUCOMTR-MCNC: 158 MG/DL — HIGH (ref 70–99)
GLUCOSE BLDC GLUCOMTR-MCNC: 171 MG/DL — HIGH (ref 70–99)
GLUCOSE SERPL-MCNC: 123 MG/DL — HIGH (ref 70–99)
HCT VFR BLD CALC: 29.4 % — LOW (ref 34.5–45)
HGB BLD-MCNC: 8.8 G/DL — LOW (ref 11.5–15.5)
MCHC RBC-ENTMCNC: 27.5 PG — SIGNIFICANT CHANGE UP (ref 27–34)
MCHC RBC-ENTMCNC: 29.9 GM/DL — LOW (ref 32–36)
MCV RBC AUTO: 91.9 FL — SIGNIFICANT CHANGE UP (ref 80–100)
METHOD TYPE: SIGNIFICANT CHANGE UP
NRBC # BLD: 0 /100 WBCS — SIGNIFICANT CHANGE UP (ref 0–0)
ORGANISM # SPEC MICROSCOPIC CNT: SIGNIFICANT CHANGE UP
ORGANISM # SPEC MICROSCOPIC CNT: SIGNIFICANT CHANGE UP
PLATELET # BLD AUTO: 148 K/UL — LOW (ref 150–400)
POTASSIUM SERPL-MCNC: 3.9 MMOL/L — SIGNIFICANT CHANGE UP (ref 3.5–5.3)
POTASSIUM SERPL-SCNC: 3.9 MMOL/L — SIGNIFICANT CHANGE UP (ref 3.5–5.3)
PROT SERPL-MCNC: 6.6 G/DL — SIGNIFICANT CHANGE UP (ref 6–8.3)
RBC # BLD: 3.2 M/UL — LOW (ref 3.8–5.2)
RBC # FLD: 16.9 % — HIGH (ref 10.3–14.5)
SODIUM SERPL-SCNC: 135 MMOL/L — SIGNIFICANT CHANGE UP (ref 135–145)
SPECIMEN SOURCE: SIGNIFICANT CHANGE UP
WBC # BLD: 6.86 K/UL — SIGNIFICANT CHANGE UP (ref 3.8–10.5)
WBC # FLD AUTO: 6.86 K/UL — SIGNIFICANT CHANGE UP (ref 3.8–10.5)

## 2022-11-07 PROCEDURE — 99233 SBSQ HOSP IP/OBS HIGH 50: CPT

## 2022-11-07 PROCEDURE — 99221 1ST HOSP IP/OBS SF/LOW 40: CPT

## 2022-11-07 RX ORDER — POVIDONE-IODINE 5 %
1 AEROSOL (ML) TOPICAL DAILY
Refills: 0 | Status: DISCONTINUED | OUTPATIENT
Start: 2022-11-07 | End: 2022-11-10

## 2022-11-07 RX ORDER — CADEXOMER IODINE 0.9 %
1 PADS, MEDICATED (EA) TOPICAL
Refills: 0 | Status: DISCONTINUED | OUTPATIENT
Start: 2022-11-07 | End: 2022-11-10

## 2022-11-07 RX ADMIN — MIDODRINE HYDROCHLORIDE 10 MILLIGRAM(S): 2.5 TABLET ORAL at 17:28

## 2022-11-07 RX ADMIN — Medication 1 APPLICATION(S): at 15:49

## 2022-11-07 RX ADMIN — Medication 1 MILLIGRAM(S): at 06:25

## 2022-11-07 RX ADMIN — PANTOPRAZOLE SODIUM 40 MILLIGRAM(S): 20 TABLET, DELAYED RELEASE ORAL at 06:26

## 2022-11-07 RX ADMIN — SIMETHICONE 80 MILLIGRAM(S): 80 TABLET, CHEWABLE ORAL at 23:33

## 2022-11-07 RX ADMIN — HEPARIN SODIUM 5000 UNIT(S): 5000 INJECTION INTRAVENOUS; SUBCUTANEOUS at 06:25

## 2022-11-07 RX ADMIN — CHLORHEXIDINE GLUCONATE 15 MILLILITER(S): 213 SOLUTION TOPICAL at 06:25

## 2022-11-07 RX ADMIN — Medication 1 MILLIGRAM(S): at 17:28

## 2022-11-07 RX ADMIN — Medication 1 MILLIGRAM(S): at 12:17

## 2022-11-07 RX ADMIN — Medication 1: at 23:33

## 2022-11-07 RX ADMIN — Medication 1 TABLET(S): at 12:18

## 2022-11-07 RX ADMIN — Medication 1: at 17:29

## 2022-11-07 RX ADMIN — Medication 1 MILLIGRAM(S): at 23:33

## 2022-11-07 RX ADMIN — MIDODRINE HYDROCHLORIDE 10 MILLIGRAM(S): 2.5 TABLET ORAL at 06:25

## 2022-11-07 RX ADMIN — HEPARIN SODIUM 5000 UNIT(S): 5000 INJECTION INTRAVENOUS; SUBCUTANEOUS at 17:29

## 2022-11-07 RX ADMIN — SIMETHICONE 80 MILLIGRAM(S): 80 TABLET, CHEWABLE ORAL at 17:28

## 2022-11-07 RX ADMIN — POLYETHYLENE GLYCOL 3350 17 GRAM(S): 17 POWDER, FOR SOLUTION ORAL at 12:19

## 2022-11-07 RX ADMIN — SIMETHICONE 80 MILLIGRAM(S): 80 TABLET, CHEWABLE ORAL at 12:17

## 2022-11-07 RX ADMIN — Medication 1: at 12:18

## 2022-11-07 RX ADMIN — SENNA PLUS 2 TABLET(S): 8.6 TABLET ORAL at 21:24

## 2022-11-07 RX ADMIN — SIMETHICONE 80 MILLIGRAM(S): 80 TABLET, CHEWABLE ORAL at 06:25

## 2022-11-07 RX ADMIN — Medication 100 MILLIGRAM(S): at 17:28

## 2022-11-07 RX ADMIN — Medication 2 MILLIGRAM(S): at 15:49

## 2022-11-07 RX ADMIN — CHLORHEXIDINE GLUCONATE 1 APPLICATION(S): 213 SOLUTION TOPICAL at 12:18

## 2022-11-07 RX ADMIN — MIDODRINE HYDROCHLORIDE 10 MILLIGRAM(S): 2.5 TABLET ORAL at 12:18

## 2022-11-07 RX ADMIN — CHLORHEXIDINE GLUCONATE 15 MILLILITER(S): 213 SOLUTION TOPICAL at 17:29

## 2022-11-07 NOTE — PROGRESS NOTE ADULT - SUBJECTIVE AND OBJECTIVE BOX
[facility], Division of Infectious Diseases  MOIZ Lora Y. Patel, S. Shah, G. [facility]  [phone]    Name: BREA BECKHAM  Age: 76y  Gender: Female  MRN: [id number]    Interval History:  Patient seen and examined at bedside   No acute overnight events. Afebrile  Notes reviewed    Antibiotics:  levoFLOXacin IVPB 750 milliGRAM(s) IV Intermittent every 24 hours      Medications:  acetaminophen     Tablet .. 650 milliGRAM(s) Oral every 6 hours PRN  bisacodyl Suppository 10 milliGRAM(s) Rectal daily PRN  cadexomer iodine 0.9% Gel 1 Application(s) Topical <User Schedule>  chlorhexidine 0.12% Liquid 15 milliLiter(s) Oral Mucosa every 12 hours  chlorhexidine 2% Cloths 1 Application(s) Topical daily  dextrose 5%. 1000 milliLiter(s) IV Continuous <Continuous>  dextrose 5%. 1000 milliLiter(s) IV Continuous <Continuous>  dextrose 50% Injectable 25 Gram(s) IV Push once  dextrose 50% Injectable 12.5 Gram(s) IV Push once  dextrose 50% Injectable 25 Gram(s) IV Push once  dextrose Oral Gel 15 Gram(s) Oral once PRN  glucagon  Injectable 1 milliGRAM(s) IntraMuscular once  heparin   Injectable 5000 Unit(s) SubCutaneous every 12 hours  insulin lispro (ADMELOG) corrective regimen sliding scale   SubCutaneous every 6 hours  lactobacillus acidophilus 1 Tablet(s) Oral daily  levoFLOXacin IVPB 750 milliGRAM(s) IV Intermittent every 24 hours  LORazepam     Tablet 1 milliGRAM(s) Oral four times a day  LORazepam   Injectable 2 milliGRAM(s) IV Push every 4 hours PRN  midodrine. 10 milliGRAM(s) Oral three times a day  ondansetron Injectable 4 milliGRAM(s) IV Push every 8 hours PRN  pantoprazole    Tablet 40 milliGRAM(s) Oral before breakfast  polyethylene glycol 3350 17 Gram(s) Oral daily  povidone iodine 10% Solution 1 Application(s) Topical daily  senna 2 Tablet(s) Oral at bedtime  simethicone 80 milliGRAM(s) Chew every 6 hours      Review of Systems:  unable to obtain  Allergies: codeine (Hives)    For details regarding the patient's past medical history, social history, family history, and other miscellaneous elements, please refer the initial infectious diseases consultation and/or the admitting history and physical examination for this admission.    Objective:  Vitals:   T(C): 36.5 (11-07-22 @ 11:30), Max: 37.4 (11-06-22 @ 18:00)  HR: 68 (11-07-22 @ 08:30) (63 - 84)  BP: 123/68 (11-07-22 @ 08:00) (92/62 - 129/64)  RR: 18 (11-07-22 @ 08:00) (16 - 19)  SpO2: 98% (11-07-22 @ 08:30) (98% - 100%)    Physical Examination:  General: chronically ill appearing   Neck: trach  HEENT: NC/AT  Lungs: MV breath sounds  Heart: Regular rate and rhythm. No murmur, rub or gallop.  Abdomen: Soft. Nondistended. Nontender. PGT in place  Skin: Warm. Dry. Good turgor    Laboratory Studies:  CBC:                       8.8    6.86  )-----------( 148      ( 07 Nov 2022 05:41 )             29.4     CMP: 11-07    135  |  102  |  16  ----------------------------<  123<H>  3.9   |  26  |  1.12    Ca    8.0<L>      07 Nov 2022 05:41    TPro  6.6  /  Alb  1.5<L>  /  TBili  0.7  /  DBili  x   /  AST  15  /  ALT  <10<L>  /  AlkPhos  108  11-07    LIVER FUNCTIONS - ( 07 Nov 2022 05:41 )  Alb: 1.5 g/dL / Pro: 6.6 g/dL / ALK PHOS: 108 U/L / ALT: <10 U/L DA / AST: 15 U/L / GGT: x               Microbiology: reviewed    Culture - Sputum (collected 11-05-22 @ 06:39)  Source: Trach Asp Tracheal Aspirate  Gram Stain (11-05-22 @ 14:45):    Few Squamous epithelial cells per low power field    Few polymorphonuclear leukocytes per low power field    Rare Gram Positive Rods per oil power field    Rare Gram Negative Rods per oil power field  Preliminary Report (11-07-22 @ 10:10):    Numerous Stenotrophomonas maltophilia Susceptibility to follow.    Culture - Urine (collected 11-04-22 @ 13:54)  Source: Clean Catch Clean Catch (Midstream)  Final Report (11-07-22 @ 10:15):    >100,000 CFU/ml Enterococcus faecium (vancomycin resistant)    50,000 - 99,000 CFU/mL Candida albicans "Susceptibilities not performed"  Organism: Enterococcus faecium (vancomycin resistant) (11-07-22 @ 10:15)  Organism: Enterococcus faecium (vancomycin resistant) (11-07-22 @ 10:15)      -  Ampicillin: R >8 Predicts results to ampicillin/sulbactam, amoxacillin-clavulanate and piperacillin-tazobactam.      -  Ciprofloxacin: R >2      -  Daptomycin: SDD 4 The breakpoint for SDD (sensitive dose dependent)is based on a dosage regimen of 8-12 mg/kg administered every 24 h in adults and is intended for serious infections due to E. faecium. Consultation with an infectious diseases specialist is recommended.      -  Levofloxacin: R >4      -  Linezolid: S 2      -  Nitrofurantoin: R >64 Should not be used to treat pyelonephritis.      -  Tetra/Doxy: S <=4      -  Vancomycin: R >16      Method Type: PRATIK    Culture - Blood (collected 11-04-22 @ 13:52)  Source: .Blood Blood-Peripheral  Preliminary Report (11-05-22 @ 20:02):    No growth to date.    Culture - Blood (collected 11-04-22 @ 13:52)  Source: .Blood Blood-Peripheral  Preliminary Report (11-05-22 @ 20:02):    No growth to date.          Radiology: reviewed

## 2022-11-07 NOTE — PROGRESS NOTE ADULT - SUBJECTIVE AND OBJECTIVE BOX
BREA BECKHAM     [facility]    Allergies    codeine (Hives)    Intolerances        PAST MEDICAL & SURGICAL HISTORY:  Dementia of frontal lobe type      Aphasic stroke      Diabetes mellitus      Respiratory failure      Hypertension      GERD (gastroesophageal reflux disease)      Constipation      Respiratory failure      CVA (cerebral vascular accident)      HTN (hypertension)      DM (diabetes mellitus)      Advanced dementia      COVID-19 virus detected      Quadriplegia      Pneumonia      Type II diabetes mellitus      Hx of appendectomy      Gastrostomy in place      Tracheostomy in place      Tracheostomy tube present      Feeding by G-tube          FAMILY HISTORY:  No pertinent family history in first degree relatives        Home Medications:  albuterol 90 mcg/inh inhalation aerosol with adapter: 2  inhaled every 6 hours (18 Oct 2022 11:42)  Bacid (LAC) oral tablet: 2 tab(s) by gastrostomy tube once a day (18 Oct 2022 11:42)  Betadine 10% topical swab: cleanse right and left great toes (18 Oct 2022 11:42)  chlorhexidine 0.12% mucous membrane liquid: 15 milliliter(s) mucous membrane 2 times a day (18 Oct 2022 11:42)  Eucerin topical cream: Apply topically to affected area once a day bilateral feet (18 Oct 2022 11:42)  insulin glargine 100 units/mL subcutaneous solution: 8 unit(s) subcutaneous once a day (in the morning) (18 Oct 2022 11:42)  ipratropium-albuterol 0.5 mg-2.5 mg/3 mL inhalation solution: 3 milliliter(s) inhaled 4 times a day (18 Oct 2022 11:42)  LORazepam 1 mg oral tablet: 1 tab(s) by gastrostomy tube every 4 hours (18 Oct 2022 11:42)  midodrine 10 mg oral tablet: 1 tab(s) orally every 8 hours (02 Nov 2022 11:44)  MiraLax oral powder for reconstitution: g-tube (18 Oct 2022 13:04)  Multiple Vitamins oral tablet: 1 tab(s) orally once a day (18 Oct 2022 11:42)  nystatin 100,000 units/g topical powder: 1 application topically 3 times a day (18 Oct 2022 11:42)  omeprazole 20 mg oral delayed release capsule: orally 2 times a day (18 Oct 2022 11:42)  polyethylene glycol 3350 oral powder for reconstitution: 17 gram(s) by gastrostomy tube every 12 hours (18 Oct 2022 11:42)  senna 8.6 mg oral tablet: 3 tab(s) by gastrostomy tube once a day (at bedtime) (18 Oct 2022 11:42)  simethicone 80 mg oral tablet, chewable: 1 tab(s) by gastrostomy tube every 6 hours (18 Oct 2022 11:42)  sucralfate 1 g/10 mL oral suspension: 10 milliliter(s) g-tube 4 times a day (before meals and at bedtime) (18 Oct 2022 13:04)  Tylenol 325 mg oral tablet: 2 tab(s) by gastrostomy tube once a day; 60 minutes prior to dressing change  (18 Oct 2022 11:42)  Tylenol 325 mg oral tablet: 2 tab(s) by gastrostomy tube every 6 hours, As Needed (18 Oct 2022 11:42)      MEDICATIONS  (STANDING):  chlorhexidine 0.12% Liquid 15 milliLiter(s) Oral Mucosa every 12 hours  chlorhexidine 2% Cloths 1 Application(s) Topical daily  dextrose 5%. 1000 milliLiter(s) (100 mL/Hr) IV Continuous <Continuous>  dextrose 5%. 1000 milliLiter(s) (50 mL/Hr) IV Continuous <Continuous>  dextrose 50% Injectable 25 Gram(s) IV Push once  dextrose 50% Injectable 12.5 Gram(s) IV Push once  dextrose 50% Injectable 25 Gram(s) IV Push once  glucagon  Injectable 1 milliGRAM(s) IntraMuscular once  heparin   Injectable 5000 Unit(s) SubCutaneous every 12 hours  insulin lispro (ADMELOG) corrective regimen sliding scale   SubCutaneous every 6 hours  lactobacillus acidophilus 1 Tablet(s) Oral daily  levoFLOXacin IVPB 750 milliGRAM(s) IV Intermittent every 24 hours  LORazepam     Tablet 1 milliGRAM(s) Oral four times a day  midodrine. 10 milliGRAM(s) Oral three times a day  pantoprazole    Tablet 40 milliGRAM(s) Oral before breakfast  polyethylene glycol 3350 17 Gram(s) Oral daily  senna 2 Tablet(s) Oral at bedtime  simethicone 80 milliGRAM(s) Chew every 6 hours    MEDICATIONS  (PRN):  acetaminophen     Tablet .. 650 milliGRAM(s) Oral every 6 hours PRN Temp greater or equal to 38C (100.4F), Mild Pain (1 - 3)  bisacodyl Suppository 10 milliGRAM(s) Rectal daily PRN Constipation  dextrose Oral Gel 15 Gram(s) Oral once PRN Blood Glucose LESS THAN 70 milliGRAM(s)/deciliter  LORazepam   Injectable 2 milliGRAM(s) IV Push every 4 hours PRN Agitation  ondansetron Injectable 4 milliGRAM(s) IV Push every 8 hours PRN Nausea and/or Vomiting      Diet, NPO with Tube Feed:   Tube Feeding Modality: Gastrostomy  Pulmocare  Total Volume for 24 Hours (mL): 840  Continuous  Starting Tube Feed Rate mL per Hour: 20  Increase Tube Feed Rate by (mL): 10     Every 8 hours  Until Goal Tube Feed Rate (mL per Hour): 35  Tube Feed Duration (in Hours): 24  Tube Feed Start Time: 08:00  Free Water Flush   Total Volume per Flush (mL): 250   Frequency: Every 6 Hours   Total Daily Volume of Flush (mL): 100    Start Time: 08:00  Lucas(7 Gm Arginine/7 Gm Glut/1.2 Gm HMB     Qty per Day:  1 (11-06-22 @ 19:28) [Active]  Diet, NPO with Tube Feed:   Tube Feeding Modality: Gastrostomy  Pulmocare  Total Volume for 24 Hours (mL): 840  Continuous  Starting Tube Feed Rate mL per Hour: 20  Increase Tube Feed Rate by (mL): 10     Every 8 hours  Until Goal Tube Feed Rate (mL per Hour): 35  Tube Feed Duration (in Hours): 24  Tube Feed Start Time: 12:00  Free Water Flush  Bolus   Total Volume per Flush (mL): 250   Frequency: Every 6 Hours  Lucas(7 Gm Arginine/7 Gm Glut/1.2 Gm HMB     Qty per Day:  1  No Carb Prosource TF     Qty per Day:  1 (11-05-22 @ 15:04) [Pending Verification By Attending]          Vital Signs Last 24 Hrs  T(C): 36.7 (07 Nov 2022 07:52), Max: 37.4 (06 Nov 2022 18:00)  T(F): 98.1 (07 Nov 2022 07:52), Max: 99.4 (06 Nov 2022 18:00)  HR: 68 (07 Nov 2022 08:30) (63 - 84)  BP: 123/68 (07 Nov 2022 08:00) (92/62 - 129/64)  BP(mean): 84 (07 Nov 2022 08:00) (62 - 90)  RR: 18 (07 Nov 2022 08:00) (16 - 19)  SpO2: 98% (07 Nov 2022 08:30) (98% - 100%)    Parameters below as of 07 Nov 2022 08:00  Patient On (Oxygen Delivery Method): ventilator    O2 Concentration (%): 40      11-06-22 @ 07:01  -  11-07-22 @ 07:00  --------------------------------------------------------  IN: 555 mL / OUT: 200 mL / NET: 355 mL    11-07-22 @ 07:01  -  11-07-22 @ 10:51  --------------------------------------------------------  IN: 105 mL / OUT: 0 mL / NET: 105 mL        Mode: AC/ CMV (Assist Control/ Continuous Mandatory Ventilation), RR (machine): 16, TV (machine): 400, FiO2: 50, PEEP: 5, ITime: 1, MAP: 16, PIP: 35      LABS:                        8.8    6.86  )-----------( 148      ( 07 Nov 2022 05:41 )             29.4     11-07    135  |  102  |  16  ----------------------------<  123<H>  3.9   |  26  |  1.12    Ca    8.0<L>      07 Nov 2022 05:41    TPro  6.6  /  Alb  1.5<L>  /  TBili  0.7  /  DBili  x   /  AST  15  /  ALT  <10<L>  /  AlkPhos  108  11-07    PT/INR - ( 06 Nov 2022 06:27 )   PT: 15.4 sec;   INR: 1.34 ratio                   WBC:  WBC Count: 6.86 K/uL (11-07 @ 05:41)  WBC Count: 10.04 K/uL (11-06 @ 06:27)  WBC Count: 9.13 K/uL (11-05 @ 06:34)  WBC Count: 10.99 K/uL (11-04 @ 13:48)      MICROBIOLOGY:  RECENT CULTURES:  11-05 Trach Asp Tracheal Aspirate XXXX   Few Squamous epithelial cells per low power field  Few polymorphonuclear leukocytes per low power field  Rare Gram Positive Rods per oil power field  Rare Gram Negative Rods per oil power field   Numerous Stenotrophomonas maltophilia Susceptibility to follow.    11-04 Clean Catch Clean Catch (Midstream) Enterococcus faecium (vancomycin resistant) XXXX   >100,000 CFU/ml Enterococcus faecium (vancomycin resistant)  50,000 - 99,000 CFU/mL Candida albicans "Susceptibilities not performed"    11-04 .Blood Blood-Peripheral XXXX XXXX   No growth to date.                PT/INR - ( 06 Nov 2022 06:27 )   PT: 15.4 sec;   INR: 1.34 ratio             Sodium:  Sodium, Serum: 135 mmol/L (11-07 @ 05:41)  Sodium, Serum: 136 mmol/L (11-06 @ 06:27)  Sodium, Serum: 138 mmol/L (11-05 @ 06:34)  Sodium, Serum: 138 mmol/L (11-04 @ 13:48)      1.12 mg/dL 11-07 @ 05:41  0.93 mg/dL 11-06 @ 06:27  0.91 mg/dL 11-05 @ 06:34  0.94 mg/dL 11-04 @ 13:48      Hemoglobin:  Hemoglobin: 8.8 g/dL (11-07 @ 05:41)  Hemoglobin: 10.2 g/dL (11-06 @ 06:27)  Hemoglobin: 9.4 g/dL (11-05 @ 06:34)  Hemoglobin: 10.5 g/dL (11-04 @ 13:48)      Platelets: Platelet Count - Automated: 148 K/uL (11-07 @ 05:41)  Platelet Count - Automated: 180 K/uL (11-06 @ 06:27)  Platelet Count - Automated: 184 K/uL (11-05 @ 06:34)  Platelet Count - Automated: 184 K/uL (11-04 @ 13:48)      LIVER FUNCTIONS - ( 07 Nov 2022 05:41 )  Alb: 1.5 g/dL / Pro: 6.6 g/dL / ALK PHOS: 108 U/L / ALT: <10 U/L DA / AST: 15 U/L / GGT: x                 RADIOLOGY & ADDITIONAL STUDIES:      MICROBIOLOGY:  RECENT CULTURES:  11-05 Trach Asp Tracheal Aspirate XXXX   Few Squamous epithelial cells per low power field  Few polymorphonuclear leukocytes per low power field  Rare Gram Positive Rods per oil power field  Rare Gram Negative Rods per oil power field   Numerous Stenotrophomonas maltophilia Susceptibility to follow.    11-04 Clean Catch Clean Catch (Midstream) Enterococcus faecium (vancomycin resistant) XXXX   >100,000 CFU/ml Enterococcus faecium (vancomycin resistant)  50,000 - 99,000 CFU/mL Candida albicans "Susceptibilities not performed"    11-04 .Blood Blood-Peripheral XXXX XXXX   No growth to date.

## 2022-11-07 NOTE — PROGRESS NOTE ADULT - ASSESSMENT
76 year old female with PMHx of dementia, COPD with chronic respiratory failure s/p trach, cardiac arrest, CHH, quadriplegia, CVA, CKD, anemia, PEG tube, and multiple hospital admissions for respiratory distress presents to the ED BIBEMS from [facility] complaining of fever,    sepsis  chr resp failure  dementia  copd  atelectasis  dysphagia  peg  trach  cva  ckd  anemia    GOC documented  pt is full code   Marciano - HCP    on TF  on Levaquin  TLC placed  overnight events noted  vs noted  labs reviewed

## 2022-11-07 NOTE — CONSULT NOTE ADULT - SUBJECTIVE AND OBJECTIVE BOX
HPI:  76 year old female with PMHx of dementia, COPD with chronic respiratory failure s/p trach, cardiac arrest, CHH, quadriplegia, CVA, CKD, anemia, PEG tube, and multiple hospital admissions for respiratory distress presents to the ED [facility] from [facility] complaining of fever, SOB, and dark sputum output from trach. Pt was discharged 2 days ago after being admitted for respiratory distress and pneumonia. Unable to obtain further history secondary to dementia. On admission patient presents with:  1. Gluteal fold unstageable pressure injury 3.5 x 5 x 3 dark/tan slough  scant/moderate serosanguinous drainage no odor, periwound erythema  2. Sacral area scar tissue likely a  resolved stage 3/4 pressure injury  -as per prevention protocol  3. Left hallux unstageable pressure injury 1.5 x 1.5 dry, periwound dry mild erythema  4. Right hallux unstageable pressure injury 1.5 x 1.5 dry, periwound dry mild erythema  5. Right lateral foot unstageable pressure injury resolved callous, periwound dry mild erythema  6. Right lateral/plantar foot unstageable pressure injury resolved with callous dry, periwound dry mild erythema        PAST MEDICAL & SURGICAL HISTORY:  Dementia of frontal lobe type      Aphasic stroke      Diabetes mellitus      Respiratory failure      Hypertension      GERD (gastroesophageal reflux disease)      Constipation      Respiratory failure      CVA (cerebral vascular accident)      HTN (hypertension)      DM (diabetes mellitus)      REVIEW OF SYSTEMS  Unable to obtain ROS  2/2 non-verbal status        MEDICATIONS  (STANDING):  chlorhexidine 0.12% Liquid 15 milliLiter(s) Oral Mucosa every 12 hours  chlorhexidine 2% Cloths 1 Application(s) Topical daily  dextrose 5%. 1000 milliLiter(s) (50 mL/Hr) IV Continuous <Continuous>  dextrose 5%. 1000 milliLiter(s) (100 mL/Hr) IV Continuous <Continuous>  dextrose 50% Injectable 25 Gram(s) IV Push once  dextrose 50% Injectable 12.5 Gram(s) IV Push once  dextrose 50% Injectable 25 Gram(s) IV Push once  glucagon  Injectable 1 milliGRAM(s) IntraMuscular once  heparin   Injectable 5000 Unit(s) SubCutaneous every 12 hours  insulin lispro (ADMELOG) corrective regimen sliding scale   SubCutaneous every 6 hours  lactobacillus acidophilus 1 Tablet(s) Oral daily  levoFLOXacin IVPB 750 milliGRAM(s) IV Intermittent every 24 hours  LORazepam     Tablet 1 milliGRAM(s) Oral four times a day  midodrine. 10 milliGRAM(s) Oral three times a day  pantoprazole    Tablet 40 milliGRAM(s) Oral before breakfast  polyethylene glycol 3350 17 Gram(s) Oral daily  senna 2 Tablet(s) Oral at bedtime  simethicone 80 milliGRAM(s) Chew every 6 hours    MEDICATIONS  (PRN):  acetaminophen     Tablet .. 650 milliGRAM(s) Oral every 6 hours PRN Temp greater or equal to 38C (100.4F), Mild Pain (1 - 3)  bisacodyl Suppository 10 milliGRAM(s) Rectal daily PRN Constipation  dextrose Oral Gel 15 Gram(s) Oral once PRN Blood Glucose LESS THAN 70 milliGRAM(s)/deciliter  LORazepam   Injectable 2 milliGRAM(s) IV Push every 4 hours PRN Agitation  ondansetron Injectable 4 milliGRAM(s) IV Push every 8 hours PRN Nausea and/or Vomiting      Allergies    codeine (Hives)    Intolerances        SOCIAL HISTORY:      FAMILY HISTORY:  No pertinent family history in first degree relatives        Vital Signs Last 24 Hrs  T(C): 36.5 (07 Nov 2022 11:30), Max: 37.4 (06 Nov 2022 18:00)  T(F): 97.7 (07 Nov 2022 11:30), Max: 99.4 (06 Nov 2022 18:00)  HR: 68 (07 Nov 2022 08:30) (63 - 84)  BP: 123/68 (07 Nov 2022 08:00) (92/62 - 129/64)  BP(mean): 84 (07 Nov 2022 08:00) (63 - 90)  RR: 18 (07 Nov 2022 08:00) (16 - 19)  SpO2: 98% (07 Nov 2022 08:30) (98% - 100%)    Parameters below as of 07 Nov 2022 08:00  Patient On (Oxygen Delivery Method): ventilator    O2 Concentration (%): 40                        8.8    6.86  )-----------( 148      ( 07 Nov 2022 05:41 )             29.4     11-07    135  |  102  |  16  ----------------------------<  123<H>  3.9   |  26  |  1.12    Ca    8.0<L>      07 Nov 2022 05:41    TPro  6.6  /  Alb  1.5<L>  /  TBili  0.7  /  DBili  x   /  AST  15  /  ALT  <10<L>  /  AlkPhos  108  11-07      A1C with Estimated Average Glucose Result: 8.5 % (10-19-22 @ 09:05)  A1C with Estimated Average Glucose Result: 7.3 % (08-19-22 @ 06:36)      PHYSICAL EXAM:    General: NAD  ENT: no nasal discharge;  Neck: trach in vented  Extremities: no clubbing or cyanosis; ++ Lower ext edema : bilateral hands and feet contracted  Dermatologic:  1. Gluteal fold unstageable pressure injury 3.5 x 5 x 3 dark/tan slough  scant/moderate serosanguinous drainage no odor, periwound erythema  2. Sacral area scar tissue likely a  resolved stage 3/4 pressure injury  -as per prevention protocol  3. Left hallux unstageable pressure injury 1.5 x 1.5 dry, periwound dry mild erythema  4. Right hallux unstageable pressure injury 1.5 x 1.5 dry, periwound dry mild erythema  5. Right lateral foot unstageable pressure injury resolved callous, periwound dry mild erythema  6. Right lateral/plantar foot unstageable pressure injury resolved with callous dry, periwound dry mild erythema  Neurologic: Can not follow commands  Musculoskeletal/Vascular: bilateral hands and feet contractures

## 2022-11-07 NOTE — PROGRESS NOTE ADULT - ASSESSMENT
76 year old female with PMHx of dementia, COPD with chronic respiratory failure s/p trach, cardiac arrest, CHH, quadriplegia, CVA, CKD, anemia, PEG tube, and multiple hospital admissions for respiratory distress a/w sepsis and acute on chronic hypoxic respiratory failure due to suspected recurrent VAP.    Severe sepsis and acute hypoxic respiratory failure 2/2 gram-negative PNA   SPCU monitoring  - ID (Karon/Brendon mccoy), recs appreciated   Now on levaquin and vancomycin per ID recs  - c/w midodrine with hold parameters -- consider titrating dose if BP elevated  - cc/pulm consult (Jaime), recs appreciated  - palliative care (Emre), recs appreciated - pt is full code  f/u cultures  titrate Fio2    Constipation  dulcolax suppository ordered    Diastolic CHF  Hx of Pleural effusions  - last TTE was 5/30 with EF 65% with normal LVSF, dilated RV, and moderate pHTN -> repeat TTE appears unchanged  - may need repeat thoracentesis (had 1.5L drained few admissions ago), pulmonology consulted; pl effusion was parapneumonic on past admission as opposed to transudative and thus less likely related to CHF    Anemia of chronic disease  - has been evaluated by GI and hematology in the past -> dx with anemia of chronic disease with intermittent GI bleeding  - no jacqueline GI bleed per nursing     hx of HTN  - not on any anti-hypertensive meds at facility  - monitor VS    DM2  - NPO with TF  - c/w moderate dose lispro coverage sliding scale q6h  - goal -180 - now at goal - c/w lantus 5un sq QAM

## 2022-11-07 NOTE — PROGRESS NOTE ADULT - SUBJECTIVE AND OBJECTIVE BOX
Date/Time Patient Seen:  		  Referring MD:   Data Reviewed	       Patient is a 76y old  Female who presents with a chief complaint of Per H&P "76 year old female with PMHx of dementia, COPD with chronic respiratory failure s/p trach, cardiac arrest, CHH, quadriplegia, CVA, CKD, anemia, PEG tube, and multiple hospital admissions for respiratory distress a/w sepsis and acute on chronic hypoxic respiratory failure due to suspected recurrent VAP."      (05 Nov 2022 14:36)      Subjective/HPI     PAST MEDICAL & SURGICAL HISTORY:  Dementia of frontal lobe type    Aphasic stroke    Diabetes mellitus    Respiratory failure    Hypertension    GERD (gastroesophageal reflux disease)    Constipation    Respiratory failure    CVA (cerebral vascular accident)    HTN (hypertension)    DM (diabetes mellitus)    Advanced dementia    COVID-19 virus detected    Quadriplegia    Pneumonia    Type II diabetes mellitus    Hx of appendectomy    Gastrostomy in place    Tracheostomy in place    Tracheostomy tube present    Feeding by G-tube          Medication list         MEDICATIONS  (STANDING):  chlorhexidine 0.12% Liquid 15 milliLiter(s) Oral Mucosa every 12 hours  chlorhexidine 2% Cloths 1 Application(s) Topical daily  dextrose 5%. 1000 milliLiter(s) (100 mL/Hr) IV Continuous <Continuous>  dextrose 5%. 1000 milliLiter(s) (50 mL/Hr) IV Continuous <Continuous>  dextrose 50% Injectable 25 Gram(s) IV Push once  dextrose 50% Injectable 12.5 Gram(s) IV Push once  dextrose 50% Injectable 25 Gram(s) IV Push once  glucagon  Injectable 1 milliGRAM(s) IntraMuscular once  heparin   Injectable 5000 Unit(s) SubCutaneous every 12 hours  insulin lispro (ADMELOG) corrective regimen sliding scale   SubCutaneous every 6 hours  lactobacillus acidophilus 1 Tablet(s) Oral daily  levoFLOXacin IVPB 750 milliGRAM(s) IV Intermittent every 24 hours  LORazepam     Tablet 1 milliGRAM(s) Oral four times a day  midodrine. 10 milliGRAM(s) Oral three times a day  pantoprazole    Tablet 40 milliGRAM(s) Oral before breakfast  polyethylene glycol 3350 17 Gram(s) Oral daily  senna 2 Tablet(s) Oral at bedtime  simethicone 80 milliGRAM(s) Chew every 6 hours    MEDICATIONS  (PRN):  acetaminophen     Tablet .. 650 milliGRAM(s) Oral every 6 hours PRN Temp greater or equal to 38C (100.4F), Mild Pain (1 - 3)  bisacodyl Suppository 10 milliGRAM(s) Rectal daily PRN Constipation  dextrose Oral Gel 15 Gram(s) Oral once PRN Blood Glucose LESS THAN 70 milliGRAM(s)/deciliter  LORazepam   Injectable 2 milliGRAM(s) IV Push every 4 hours PRN Agitation  ondansetron Injectable 4 milliGRAM(s) IV Push every 8 hours PRN Nausea and/or Vomiting         Vitals log        ICU Vital Signs Last 24 Hrs  T(C): 37.1 (07 Nov 2022 04:02), Max: 37.4 (06 Nov 2022 18:00)  T(F): 98.7 (07 Nov 2022 04:02), Max: 99.4 (06 Nov 2022 18:00)  HR: 66 (07 Nov 2022 05:20) (63 - 92)  BP: 98/47 (07 Nov 2022 04:02) (92/62 - 138/66)  BP(mean): 63 (07 Nov 2022 04:02) (62 - 90)  ABP: --  ABP(mean): --  RR: 16 (07 Nov 2022 04:02) (16 - 25)  SpO2: 99% (07 Nov 2022 05:20) (98% - 100%)    O2 Parameters below as of 07 Nov 2022 02:00  Patient On (Oxygen Delivery Method): ventilator             Mode: AC/ CMV (Assist Control/ Continuous Mandatory Ventilation)  RR (machine): 16  TV (machine): 400  FiO2: 50  PEEP: 5  ITime: 1  MAP: 15  PIP: 38      Input and Output:  I&O's Detail    05 Nov 2022 08:01  -  06 Nov 2022 07:00  --------------------------------------------------------  IN:    IV PiggyBack: 100 mL    Sodium Chloride 0.9% Bolus: 1000 mL  Total IN: 1100 mL    OUT:    Voided (mL): 300 mL  Total OUT: 300 mL    Total NET: 800 mL      06 Nov 2022 07:01  -  07 Nov 2022 06:18  --------------------------------------------------------  IN:    Enteral Tube Flush: 250 mL    Pulmocare: 245 mL  Total IN: 495 mL    OUT:    Voided (mL): 200 mL  Total OUT: 200 mL    Total NET: 295 mL          Lab Data                        8.8    6.86  )-----------( 148      ( 07 Nov 2022 05:41 )             29.4     11-07    135  |  102  |  16  ----------------------------<  123<H>  3.9   |  26  |  1.12    Ca    8.0<L>      07 Nov 2022 05:41  Phos  2.8     11-05    TPro  6.6  /  Alb  1.5<L>  /  TBili  0.7  /  DBili  x   /  AST  15  /  ALT  <10<L>  /  AlkPhos  108  11-07            Review of Systems	      Objective     Physical Examination  heart s1s2  lung dec bS  head nc        Pertinent Lab findings & Imaging      Annalise:  NO   Adequate UO     I&O's Detail    05 Nov 2022 08:01  -  06 Nov 2022 07:00  --------------------------------------------------------  IN:    IV PiggyBack: 100 mL    Sodium Chloride 0.9% Bolus: 1000 mL  Total IN: 1100 mL    OUT:    Voided (mL): 300 mL  Total OUT: 300 mL    Total NET: 800 mL      06 Nov 2022 07:01  -  07 Nov 2022 06:18  --------------------------------------------------------  IN:    Enteral Tube Flush: 250 mL    Pulmocare: 245 mL  Total IN: 495 mL    OUT:    Voided (mL): 200 mL  Total OUT: 200 mL    Total NET: 295 mL               Discussed with:     Cultures:	        Radiology

## 2022-11-07 NOTE — PROGRESS NOTE ADULT - SUBJECTIVE AND OBJECTIVE BOX
Chief Complaint: Fever, respiratory distress    Interval Events: No events overnight.    Review of Systems:  Unable to obtain    Physical Exam:  Vital Signs Last 24 Hrs  T(C): 36.7 (07 Nov 2022 07:52), Max: 37.4 (06 Nov 2022 18:00)  T(F): 98.1 (07 Nov 2022 07:52), Max: 99.4 (06 Nov 2022 18:00)  HR: 68 (07 Nov 2022 08:30) (63 - 84)  BP: 123/68 (07 Nov 2022 08:00) (92/62 - 129/64)  BP(mean): 84 (07 Nov 2022 08:00) (62 - 90)  RR: 18 (07 Nov 2022 08:00) (16 - 19)  SpO2: 98% (07 Nov 2022 08:30) (98% - 100%)  Parameters below as of 07 Nov 2022 08:00  Patient On (Oxygen Delivery Method): ventilator  O2 Concentration (%): 40  General: NAD  HEENT: Trach  Neck: No JVD, no carotid bruit  Lungs: CTAB  CV: RRR, nl S1/S2, no M/R/G  Abdomen: S/NT/ND, +BS  Extremities: No LE edema, no cyanosis  Neuro: AAOx0  Skin: No rash    Labs:    11-07    135  |  102  |  16  ----------------------------<  123<H>  3.9   |  26  |  1.12    Ca    8.0<L>      07 Nov 2022 05:41    TPro  6.6  /  Alb  1.5<L>  /  TBili  0.7  /  DBili  x   /  AST  15  /  ALT  <10<L>  /  AlkPhos  108  11-07                        8.8    6.86  )-----------( 148      ( 07 Nov 2022 05:41 )             29.4       ECG/Telemetry: Sinus rhythm

## 2022-11-07 NOTE — CONSULT NOTE ADULT - SUBJECTIVE AND OBJECTIVE BOX
S : 76y year old Female seen at bedside for  bilateral  foot ulcerations distal aspect hallux bilaterally.  Patient has a trach and is  unresponsive.    Chief Complaint : Patient is a 76y old  Female who presents with a chief complaint of Acute on Hypoxemic respiratory failure (07 Nov 2022 12:35)    HPI : HPI:  76 year old female with PMHx of dementia, COPD with chronic respiratory failure s/p trach, cardiac arrest, CHH, quadriplegia, CVA, CKD, anemia, PEG tube, and multiple hospital admissions for respiratory distress presents to the ED BIBEMS from [facility] complaining of fever, SOB, and dark sputum output from trach. Pt was discharged 2 days ago after being admitted for respiratory distress and pneumonia. Unable to obtain further history secondary to dementia.    WBC 10.9  U/A Moderate leuks    CXR There are persistent advanced bilateral infiltrates a small basilar   effusions right greater than left.  Lungs are rather unchanged compared to October 19 this year.   (04 Nov 2022 18:01)      Patient admits to  (-) Fevers, (-) Chills, (-) Nausea, (-) Vomiting, (-) Shortness of Breath      PMH: Dementia of frontal lobe type    Aphasic stroke    Diabetes mellitus    Respiratory failure    Hypertension    GERD (gastroesophageal reflux disease)    Constipation    Respiratory failure    CVA (cerebral vascular accident)    HTN (hypertension)    DM (diabetes mellitus)    Advanced dementia    COVID-19 virus detected    Quadriplegia    Pneumonia    Type II diabetes mellitus      PSH:Hx of appendectomy    Gastrostomy in place    Tracheostomy in place    Tracheostomy tube present    Feeding by G-tube        Allergies:codeine (Hives)      Labs:                          8.8    6.86  )-----------( 148      ( 07 Nov 2022 05:41 )             29.4     WBC Trend  6.86 Date (11-07 @ 05:41)  10.04 Date (11-06 @ 06:27)  9.13 Date (11-05 @ 06:34)  10.99<H> Date (11-04 @ 13:48)  8.30 Date (11-02 @ 06:18)  11.33<H> Date (11-01 @ 07:12)  8.82 Date (10-31 @ 05:30)  7.09 Date (10-30 @ 07:02)  10.02 Date (10-29 @ 06:44)  8.11 Date (10-28 @ 06:15)  10.05 Date (10-27 @ 06:00)  11.65<H> Date (10-26 @ 06:23)  15.63<H> Date (10-25 @ 06:37)  13.05<H> Date (10-24 @ 07:04)  12.38<H> Date (10-23 @ 06:00)  9.40 Date (10-22 @ 06:07)  10.36 Date (10-21 @ 06:30)  15.38<H> Date (10-20 @ 06:21)  11.58<H> Date (10-19 @ 20:43)  8.81 Date (10-19 @ 06:32)  13.01<H> Date (10-18 @ 11:30)      Chem  11-07    135  |  102  |  16  ----------------------------<  123<H>  3.9   |  26  |  1.12    Ca    8.0<L>      07 Nov 2022 05:41    TPro  6.6  /  Alb  1.5<L>  /  TBili  0.7  /  DBili  x   /  AST  15  /  ALT  <10<L>  /  AlkPhos  108  11-07          T(F): 97.7 (11-07-22 @ 11:30), Max: 99.4 (11-06-22 @ 18:00)  HR: 66 (11-07-22 @ 16:16) (63 - 84)  BP: 123/68 (11-07-22 @ 08:00) (92/62 - 129/64)  RR: 18 (11-07-22 @ 08:00) (16 - 18)  SpO2: 98% (11-07-22 @ 16:16) (98% - 100%)  Wt(kg): --    O:   General: Pleasant  female NAD & AOX3.    Integument:  Skin warm, dry and supple bilateral.    Ulceration  distal aspect of the hallux bilaterally  + hyperkeratotic border, wound base Necrotic wound size (2 cm X 1.5cm X .5m) + edema, + jose roberto-wound erythema, - purulence, + fluctuance, + tracking/tunneling, + probe to bone.  The left foot is more severe than the right foot.  There is clinical concern for underlying OM bilaterally left greater than right. There is contracture of the hallux causing pressure about the distal aspect of the hallux bilaterally.   Vascular: Dorsalis Pedis 2/4 bilat and Posterior Tibial pulses n/p bilat.  Capillary re-fill time less then 3 seconds digits 1-5 bilateral.    Neuro: Protective sensation diminished to the level of the digits bilateral.  MSK: Muscle strength 1/5 all major muscle groups bilateral.  Deformity:  A: ulcerations distal aspect of the hallux bilaterally      rule out OM hallux bilaterally       P:   Chart reviewed and Patient evaluated  Patient unresponsive  Wound flush with normal saline   ordered  wound cultures to be sent to Pathology hallux bilaterally  light  debridement  of non viable soft  tissue bilateral foot ulcerations hallux with debridement of nail and hyperkeratotic skin  Applied  dry sterile dressing  X-rays  ordered bilateral feet   cont antibiotics As Per ID  Offloading to bilateral Heels.   Discussed importance of daily foot examinations and proper shoe gear and to importance of lower Fasting Blood Glucose levels.   Podiatry will follow while in house.   will discuss care plan  with all  Attendings

## 2022-11-07 NOTE — PROGRESS NOTE ADULT - ASSESSMENT
76 year old female with PMHx of dementia, COPD with chronic respiratory failure s/p trach, cardiac arrest, CHH, quadriplegia, CVA, CKD, anemia, PEG tube, and multiple hospital admissions for respiratory distress presents to the ED BIBEMS from [facility] for fever, SOB, and dark sputum output from trach.     Acute VAP/PNA  Acute on chronic HF  CAUTI/UTI  - recently d/c 2 days ago  - CXR reviewed  - prior cx reviewed  --Stenotrophomonas maltophilia  --CRE Pseudomonas  - UCx Enterococcus, R to levofloxacin  - Sputum cx Stenotrophomonas  - S/p vancomycin/zosyn in the ED  Plan:   C/w levofloxacin 750mg q24h  Adding linezolid x5 day course  Trend temps and cbc  Pulmonary toilet  Isolation per infection control policy given hx of CRE  Supportive care/vent management    Spoke to  at length   expressed c/f gangrene of the toes   Insisting that pt be seen by vascular--at NH, (?) vascular physician recommended arterial studies, but they were not done    D/w Dr. Ruth    Infectious Diseases will continue to follow. Please call with any questions.   Andressa Brantley M.D.  [facility] Division of Infectious Diseases [phone] 76 year old female with PMHx of dementia, COPD with chronic respiratory failure s/p trach, cardiac arrest, CHH, quadriplegia, CVA, CKD, anemia, PEG tube, and multiple hospital admissions for respiratory distress presents to the ED BIBEMS from [facility] for fever, SOB, and dark sputum output from trach.     Acute VAP/PNA  Acute on chronic HF  CAUTI/UTI  - recently d/c 2 days ago  - CXR reviewed  - prior cx reviewed  --Stenotrophomonas maltophilia  --CRE Pseudomonas  - UCx Enterococcus, R to levofloxacin  - Sputum cx Stenotrophomonas  - S/p vancomycin/zosyn in the ED  Plan:   C/w levofloxacin 750mg q24h  Adding doxycycline x5 day course  Trend temps and cbc  Pulmonary toilet  Isolation per infection control policy given hx of CRE  Supportive care/vent management    Spoke to  at length   expressed c/f gangrene of the toes   Insisting that pt be seen by vascular--at NH, (?) vascular physician recommended arterial studies, but they were not done    D/w Dr. Ruth    Infectious Diseases will continue to follow. Please call with any questions.   Andressa Brantley M.D.  [facility] Division of Infectious Diseases [phone]

## 2022-11-07 NOTE — CONSULT NOTE ADULT - ASSESSMENT
Patient presents with   1. Gluteal fold unstageable pressure injury 3 x 5 x 3 dark/tan slough  probe to the [id number]% scant/moderate serosanguinous drainage no odor, periwound erythema: periwound dark, friable to 3cm 4-9:00  recommendation:   iodosorb QOD  2. Sacral area scar tissue likely a  resolved stage 3/4 pressure injury  -as per prevention protocol  3. Left hallux unstageable pressure injury 1.5 x 1.5 dry, periwound dry mild erythema  recommendation:   -Continue Betadine daily  4. Right hallux unstageable pressure injury 1.5 x 1.5 dry, periwound dry mild erythema  recommendation:   -Continue Betadine daily  5. Right lateral foot unstageable pressure injury resolved callous, periwound dry mild erythema  recommendation:  -Continue Betadine daily  6. Right lateral/plantar foot unstageable pressure injury resolved with callous dry, periwound dry mild erythema  recommendation:  -Continue Betadine daily  Patient is on a low air loss mattress  for the critically ill patient experiencing multiorgan failure, impaired tissue oxygenation, and perfusion can contribute to skin failure thus the development of a pressure related injury may be unavoidable    This pressure injury is community acquired/YES  At risk for altered tissue perfusion /YES  Impaired perfusion of peripheral tissue /YES  Continue  Nutrition (as tolerated)  Continue  Offloading   Continue Pericare  Apply cair boots at all times while in bed.   Provide skin checks and foot placement q8h.  Care as per medicine will follow w/ you  Follow up as outpatient at [facility]   Findings and recommendations discussed with BRANDEN Madrid  Thank you for this consult  Ana Luisa Cantu NP, [facility] [phone]

## 2022-11-07 NOTE — PROGRESS NOTE ADULT - ASSESSMENT
REVIEW OF SYMPTOMS      Able to give (reliable) ROS  NO     PHYSICAL EXAM    HEENT Unremarkable  atraumatic   RESP Fair air entry EXP prolonged    Harsh breath sound Resp distres mild   CARDIAC S1 S2 No S3     NO JVD    ABDOMEN SOFT BS PRESENT NOT DISTENDED No hepatosplenomegaly   PEDAL EDEMA present No calf tenderness  NO rash     GENERAL DATA .   GOC.        ALLGY. codeine                           WT.   BMI.                              ICU STAY. 11/4/2022  COVID.  11/4 scv2 (-)     BEST PRACTICE ISSUES.    HOB ELEVATN. Yes  DVT PPLX.  11/4/2022 hpsc    SQUIRES PPLX.  11/4/2022 protonix 40     INFN PPLX.    11/5/2022 chlorhexidine 2%   11/5/2022 chlorhexidine .12%   SP SW EM.        DIET. 11/5/2022 jevity 1.5 30/h gt    VS/ PO/IO/ VENT/ DRIPS.  11/7/2022 afeb 68 120/60   11/7/2022 vent 98%  11/7/2022 ac 16/400/5/.5    11/6/2022 afeb 69 100/60   11/6/2022 ac 16/400/5/.5     ASSESSMENT/RECOMMENDATIONS .   RESP.  -- Gas exchange.  Monitor & target po 90-95%  -- VENT MANAGEMENT.   HOB elevation  Target Pplat 30 (-)  Target PO 90-95%  Target pH 730 (+)  Daily spontaneous breathing trials   Daily sedation vacation   .. Episodes of tachypnea while on vent causing vent dyssynchrony  11/6/2022 she has remained hemodynamically stable and pulse ox has been ok  I think these episodes are neuro and have been noted on prior admissions with no successful intervention so far   -- Pleural effsn  Past ritchie   R THORAC   6/8/2022  g 161 l 222 p 4.9 p 10 l 16 .38    L THORAC   5/25/2022 g 183 l 144/381 .37 p 3.4/5.3 .64 p 23 l 36   5/25/2022l pl fluid  lymph pred exudate   cyto (-)     Ct ch 10/18/2022 sm r mod l effs large subpulmonic component   echo 8/19/2022 dd1 n lvsf mod ph pasp 58   a/r No thoracentesis plannd at this point risk prohibitive in vent pt   -- RO VTE  11/4/2022  venous duplx legs (-)  INFECTION.  .. SCV2 status.  Scv2 11/4/2022 scv2 (-)   .. VAP.  RECENT ID HISTORY  5/2/2022 SERRATIA CARBAPENEM RESISTANT PSEUDOMONAS  uc 10/18 100K E coli  sp 10/19 Stenotrophomonas  10/19-10/26/2022 levaquin 750  10/21/2022 rocephin x 7d Dr BRITTANY Brantley  w 11/4-11/5-11/7/2022 w 10.9 - 9.1 - 6.8   pr 11/6/2022 .6   ua 11/4/2022 w 11-25   cxr 11/4/2022 persistent advanced bl infiltr cw 11/4/2022 small basl effesns r gr  than l   rvp 11/4 (-)   trach 11/5 stenotrophomonas   uc 11/4 vr enterococc 50-99 candida   bc 11/4 (-)   11/4/2022 levaquin 750    CARDIAC.  .. Hemodynamics.   11/4/2022 midodrine 10.3  echo 8/19/2022 dd1 n lvsf mod ph pasp 58   target map 65 (+)   GI.  .. Nutrition.  11/4/2022 tf  .. constipation.  11/4/2022 senna   11/4/2022 miralax   HEMAT.  .. DVT pplx.   11/4/2022 hpsc   .. anemia.  Hb 11/4-11/5-11/7/2022 Hb 10.5 - 9.4 - 8.8   mcv 11/4/2022 mcv 93   monitor   RENAL.  .. renal parameters   Na 11/4/2022 Na 139  co2 11/4/2022 co2 25   cr 11/4/2022 cr .9   IV fl.  ENDOCRINE.   NEURO.  .. seizures.  11/4/2022 lorazepam 1.4     OVERALL ASSESSMENT/DISPOSITION.  76 f PMH trach peg cva cac anoxic encephalo PH 8/19/2022 pasp 58 bl pl effs  r thora 6/8/2022 and l thora 5/25/2022 were lp exudates  recurrent hospitalizations HO CR psuedomonas 5/2/2022 zosyn 8/19/2022  recent admission 10/18-11/2/2022 uc 10/18 100K E coli  sp 10/19 Stenotrophomonas  10/19-10/26/2022 levaquin 750  10/21/2022 rocephin x 7d Dr BRITTANY Brantley sent back from [facility] 11/4/2022 with fever   Pulm crit care consulted 11/4/2022    .. VAP   w 11/4-11/5-11/7/2022 w 10.9 - 9.1 - 6.8   pr 11/6/2022 .6   ua 11/4/2022 w 11-25   cxr 11/4/2022 persistent advanced bl infiltr cw 11/4/2022 small basl effesns r gr  than l   rvp 11/4 (-)   trach 11/5 stenotrophomonas   uc 11/4 vr enterococc 50-99 candida   bc 11/4 (-)   11/4/2022 levaquin     .. Resp distress  11/4 v duplx (-)   On vent fio2 40         TIME SPENT   Over 39 minutes aggregate critical care time spent on encounter; activities included   direct patient care, counseling and/or coordinating care reviewing notes, lab data/ imaging , discussion with multidisciplinary team/ patient  /family and explaining in detail risks, benefits, alternatives  of the recommendations     CHAPINCITO GUIDRY 76 f [facility] S 11/4/2022   DR HIMANSHU RANDHAWA

## 2022-11-07 NOTE — PROGRESS NOTE ADULT - SUBJECTIVE AND OBJECTIVE BOX
Patient is a 76y old  Female who presents with a chief complaint of Acute on Hypoxemic respiratory failure (07 Nov 2022 12:35)    BRIEF HOSPITAL COURSE:   76 year old female with PMHx of dementia, COPD with chronic respiratory failure s/p trach, cardiac arrest, CHH, quadriplegia, CVA, CKD, anemia, PEG tube, and multiple hospital admissions for respiratory distress a/w sepsis and acute on chronic hypoxic respiratory failure due to suspected recurrent VAP.    Events last 24 hours:   -Started on Doxycyline course per ID recs.   -Afebrile, hemodynamics stable.     PAST MEDICAL & SURGICAL HISTORY:  Dementia of frontal lobe type  Aphasic stroke  Diabetes mellitus  Respiratory failure  Hypertension  GERD (gastroesophageal reflux disease)  Constipation  Respiratory failure  CVA (cerebral vascular accident)  HTN (hypertension)  DM (diabetes mellitus)  Advanced dementia  COVID-19 virus detected  Quadriplegia  Pneumonia  Type II diabetes mellitus  Hx of appendectomy  Gastrostomy in place  Tracheostomy in place  Tracheostomy tube present  Feeding by G-tube    Review of Systems:  Due to pt mentation, subjective information was not able to be obtained from the patient. History was obtained, to the extent possible, from review of the chart and collateral sources of information.     Medications:  doxycycline IVPB      levoFLOXacin IVPB 750 milliGRAM(s) IV Intermittent every 24 hours  midodrine. 10 milliGRAM(s) Oral three times a day  acetaminophen     Tablet .. 650 milliGRAM(s) Oral every 6 hours PRN  LORazepam     Tablet 1 milliGRAM(s) Oral four times a day  LORazepam   Injectable 2 milliGRAM(s) IV Push every 4 hours PRN  ondansetron Injectable 4 milliGRAM(s) IV Push every 8 hours PRN  heparin   Injectable 5000 Unit(s) SubCutaneous every 12 hours  bisacodyl Suppository 10 milliGRAM(s) Rectal daily PRN  pantoprazole    Tablet 40 milliGRAM(s) Oral before breakfast  polyethylene glycol 3350 17 Gram(s) Oral daily  senna 2 Tablet(s) Oral at bedtime  simethicone 80 milliGRAM(s) Chew every 6 hours  dextrose 50% Injectable 25 Gram(s) IV Push once  dextrose 50% Injectable 12.5 Gram(s) IV Push once  dextrose 50% Injectable 25 Gram(s) IV Push once  dextrose Oral Gel 15 Gram(s) Oral once PRN  glucagon  Injectable 1 milliGRAM(s) IntraMuscular once  insulin lispro (ADMELOG) corrective regimen sliding scale   SubCutaneous every 6 hours  dextrose 5%. 1000 milliLiter(s) IV Continuous <Continuous>  dextrose 5%. 1000 milliLiter(s) IV Continuous <Continuous>  cadexomer iodine 0.9% Gel 1 Application(s) Topical <User Schedule>  chlorhexidine 0.12% Liquid 15 milliLiter(s) Oral Mucosa every 12 hours  chlorhexidine 2% Cloths 1 Application(s) Topical daily  povidone iodine 10% Solution 1 Application(s) Topical daily  lactobacillus acidophilus 1 Tablet(s) Oral daily    Mode: AC/ CMV (Assist Control/ Continuous Mandatory Ventilation)  RR (machine): 16  TV (machine): 400  FiO2: 40  PEEP: 5  ITime: 1  MAP: 15  PIP: 38    ICU Vital Signs Last 24 Hrs  T(C): 36.6 (07 Nov 2022 16:00), Max: 37.1 (07 Nov 2022 00:02)  T(F): 97.8 (07 Nov 2022 16:00), Max: 98.8 (07 Nov 2022 00:02)  HR: 68 (07 Nov 2022 18:00) (63 - 107)  BP: 101/61 (07 Nov 2022 18:00) (92/62 - 129/75)  BP(mean): 75 (07 Nov 2022 18:00) (63 - 86)  ABP: --  ABP(mean): --  RR: 22 (07 Nov 2022 18:00) (16 - 42)  SpO2: 100% (07 Nov 2022 18:00) (94% - 100%)  O2 Parameters below as of 07 Nov 2022 18:00  Patient On (Oxygen Delivery Method): ventilator    I&O's Detail  06 Nov 2022 07:01  -  07 Nov 2022 07:00  --------------------------------------------------------  IN:    Enteral Tube Flush: 250 mL    Pulmocare: 305 mL  Total IN: 555 mL  OUT:    Voided (mL): 200 mL  Total OUT: 200 mL  Total NET: 355 mL    07 Nov 2022 07:01  -  07 Nov 2022 20:02  --------------------------------------------------------  IN:    Enteral Tube Flush: 500 mL    Pulmocare: 385 mL  Total IN: 885 mL  OUT:    Voided (mL): 500 mL  Total OUT: 500 mL  Total NET: 385 mL    LABS:                      8.8    6.86  )-----------( 148      ( 07 Nov 2022 05:41 )             29.4     11-07  135  |  102  |  16  ----------------------------<  123<H>  3.9   |  26  |  1.12  Ca    8.0<L>      07 Nov 2022 05:41  TPro  6.6  /  Alb  1.5<L>  /  TBili  0.7  /  DBili  x   /  AST  15  /  ALT  <10<L>  /  AlkPhos  108  11-07    CAPILLARY BLOOD GLUCOSE  POCT Blood Glucose.: 156 mg/dL (07 Nov 2022 17:24)    PT/INR - ( 06 Nov 2022 06:27 )   PT: 15.4 sec;   INR: 1.34 ratio     CULTURES:  Culture Results:   Numerous Stenotrophomonas maltophilia Susceptibility to follow. (11-05-22 @ 06:39)  Rapid RVP Result: NotDetec (11-05-22 @ 02:05)  Culture Results:   >100,000 CFU/ml Enterococcus faecium (vancomycin resistant)  50,000 - 99,000 CFU/mL Candida albicans "Susceptibilities not performed" (11-04-22 @ 13:54)  Culture Results:   No growth to date. (11-04-22 @ 13:52)  Culture Results:   No growth to date. (11-04-22 @ 13:52)      Physical Examination:  General: Cachetic elderly female.   HEENT: PERRL.  NECK: +Trach.   PULM: Course BS b/l.  CVS: s1/s2.   ABD: Soft, nondistended, nontender, normoactive bowel sounds.  EXT: No edema, nontender.  SKIN: Warm.    RADIOLOGY:   < from: US Duplex Venous Lower Ext Complete, Bilateral (11.04.22 @ 21:59) >  ACC: [id number] EXAM:  US DPLX LWR EXT VEINS COMPL BI                        PROCEDURE DATE:  11/04/2022    IMPRESSION:  No evidence of deep venous thrombosis in either lower extremity.      76 year old female with PMHx of dementia, COPD with chronic respiratory failure s/p trach, cardiac arrest, CHH, quadriplegia, CVA, CKD, anemia, PEG tube, and multiple hospital admissions for respiratory distress a/w sepsis and acute on chronic hypoxic respiratory failure due to suspected recurrent VAP.  Plan:  -At baseline mentation. Ativan q6h and PRN.  -Maintain goal MAP >65. Midodrine 10mg q8h. Keep K~4 and Mg>2 for optimal arrhythmia suppression.  -Patient currently on Full vent support. Titrate settings to maintain SaO2 >90%, or pH >7.25. Low tidal volume ventilation strategy w/ goal Tv 4-6 cc/kg of ideal body weight, plateu pressure goal <30. Peridex oral care, aggressive pulmonary toilet.  -TF. Bowel regimen. Protonix.   -Levaquin course, Doxy course added per ID recs.  -DVT ppx w/ SQH.     TIME SPENT: 36 minutes  Evaluating/treating patient, reviewing data/labs/imaging, discussing case with multidisciplinary team, discussing plan/goals of care with patient/family. Non-inclusive of procedure time.

## 2022-11-07 NOTE — CONSULT NOTE ADULT - PROBLEM SELECTOR RECOMMENDATION 9
ordered wound cultures hallux bilaterally  light debridement of nail and skin hallux bilaterally under aseptic technique  application of dry sterile dressing bilateral hallux   ordered radiographs bilateral feet  Pt to cont with decubitus braces bilateral feet  cont ABX as per ID  will cont to follow and discuss with all attendings

## 2022-11-07 NOTE — PROGRESS NOTE ADULT - SUBJECTIVE AND OBJECTIVE BOX
Patient is a 76y old  Female who presents with a chief complaint of Per H&P "76 year old female with PMHx of dementia, COPD with chronic respiratory failure s/p trach, cardiac arrest, CHH, quadriplegia, CVA, CKD, anemia, PEG tube, and multiple hospital admissions for respiratory distress a/w sepsis and acute on chronic hypoxic respiratory failure due to suspected recurrent VAP."      (05 Nov 2022 14:36)      INTERVAL HPI/OVERNIGHT EVENTS:    FIO2 was decreased to 40%, currently tolerating.  No other events overnight    MEDICATIONS  (STANDING):  chlorhexidine 0.12% Liquid 15 milliLiter(s) Oral Mucosa every 12 hours  chlorhexidine 2% Cloths 1 Application(s) Topical daily  dextrose 5%. 1000 milliLiter(s) (50 mL/Hr) IV Continuous <Continuous>  dextrose 5%. 1000 milliLiter(s) (100 mL/Hr) IV Continuous <Continuous>  dextrose 50% Injectable 25 Gram(s) IV Push once  dextrose 50% Injectable 12.5 Gram(s) IV Push once  dextrose 50% Injectable 25 Gram(s) IV Push once  glucagon  Injectable 1 milliGRAM(s) IntraMuscular once  heparin   Injectable 5000 Unit(s) SubCutaneous every 12 hours  insulin lispro (ADMELOG) corrective regimen sliding scale   SubCutaneous every 6 hours  lactobacillus acidophilus 1 Tablet(s) Oral daily  levoFLOXacin IVPB 750 milliGRAM(s) IV Intermittent every 24 hours  LORazepam     Tablet 1 milliGRAM(s) Oral four times a day  midodrine. 10 milliGRAM(s) Oral three times a day  pantoprazole    Tablet 40 milliGRAM(s) Oral before breakfast  polyethylene glycol 3350 17 Gram(s) Oral daily  senna 2 Tablet(s) Oral at bedtime  simethicone 80 milliGRAM(s) Chew every 6 hours    MEDICATIONS  (PRN):  acetaminophen     Tablet .. 650 milliGRAM(s) Oral every 6 hours PRN Temp greater or equal to 38C (100.4F), Mild Pain (1 - 3)  bisacodyl Suppository 10 milliGRAM(s) Rectal daily PRN Constipation  dextrose Oral Gel 15 Gram(s) Oral once PRN Blood Glucose LESS THAN 70 milliGRAM(s)/deciliter  LORazepam   Injectable 2 milliGRAM(s) IV Push every 4 hours PRN Agitation  ondansetron Injectable 4 milliGRAM(s) IV Push every 8 hours PRN Nausea and/or Vomiting      Allergies    codeine (Hives)    Intolerances        REVIEW OF SYSTEMS:  unable to obtain    Vital Signs Last 24 Hrs  T(C): 36.7 (07 Nov 2022 07:52), Max: 37.4 (06 Nov 2022 18:00)  T(F): 98.1 (07 Nov 2022 07:52), Max: 99.4 (06 Nov 2022 18:00)  HR: 68 (07 Nov 2022 08:30) (63 - 84)  BP: 116/56 (07 Nov 2022 06:00) (92/62 - 129/64)  BP(mean): 75 (07 Nov 2022 06:00) (62 - 90)  RR: 18 (07 Nov 2022 06:00) (16 - 19)  SpO2: 98% (07 Nov 2022 08:30) (98% - 100%)    Parameters below as of 07 Nov 2022 06:00  Patient On (Oxygen Delivery Method): ventilator  O2 Flow (L/min): 50      PHYSICAL EXAM:  GENERAL: NAD, Non Verbal  HEAD:  Atraumatic, Normocephalic  ENMT: Trach to Vent   NECK: Supple, No JVD, Normal thyroid  CHEST/LUNG: Clear to auscultation bilaterally; No rales, rhonchi, wheezing, or rubs  HEART: Regular rate and rhythm; No murmurs, rubs, or gallops  ABDOMEN: Soft, Nontender, PEG Tube +   EXTREMITIES:  2+ Peripheral Pulses, No clubbing, cyanosis, or edema    LABS:                        8.8    6.86  )-----------( 148      ( 07 Nov 2022 05:41 )             29.4     07 Nov 2022 05:41    135    |  102    |  16     ----------------------------<  123    3.9     |  26     |  1.12     Ca    8.0        07 Nov 2022 05:41    TPro  6.6    /  Alb  1.5    /  TBili  0.7    /  DBili  x      /  AST  15     /  ALT  <10    /  AlkPhos  108    07 Nov 2022 05:41    PT/INR - ( 06 Nov 2022 06:27 )   PT: 15.4 sec;   INR: 1.34 ratio             CAPILLARY BLOOD GLUCOSE      POCT Blood Glucose.: 121 mg/dL (07 Nov 2022 06:23)  POCT Blood Glucose.: 144 mg/dL (06 Nov 2022 23:28)  POCT Blood Glucose.: 111 mg/dL (06 Nov 2022 17:58)  POCT Blood Glucose.: 109 mg/dL (06 Nov 2022 11:49)      RADIOLOGY & ADDITIONAL TESTS:

## 2022-11-08 LAB
-  LEVOFLOXACIN: SIGNIFICANT CHANGE UP
-  MINOCYCLINE: 25 — SIGNIFICANT CHANGE UP
-  TRIMETHOPRIM/SULFAMETHOXAZOLE: SIGNIFICANT CHANGE UP
ALBUMIN SERPL ELPH-MCNC: 1.7 G/DL — LOW (ref 3.3–5)
ALP SERPL-CCNC: 108 U/L — SIGNIFICANT CHANGE UP (ref 30–120)
ALT FLD-CCNC: <10 U/L DA — LOW (ref 10–60)
ANION GAP SERPL CALC-SCNC: 8 MMOL/L — SIGNIFICANT CHANGE UP (ref 5–17)
AST SERPL-CCNC: 20 U/L — SIGNIFICANT CHANGE UP (ref 10–40)
BILIRUB SERPL-MCNC: 0.5 MG/DL — SIGNIFICANT CHANGE UP (ref 0.2–1.2)
BUN SERPL-MCNC: 15 MG/DL — SIGNIFICANT CHANGE UP (ref 7–23)
CALCIUM SERPL-MCNC: 7.8 MG/DL — LOW (ref 8.4–10.5)
CHLORIDE SERPL-SCNC: 104 MMOL/L — SIGNIFICANT CHANGE UP (ref 96–108)
CO2 SERPL-SCNC: 27 MMOL/L — SIGNIFICANT CHANGE UP (ref 22–31)
CREAT SERPL-MCNC: 1.03 MG/DL — SIGNIFICANT CHANGE UP (ref 0.5–1.3)
EGFR: 56 ML/MIN/1.73M2 — LOW
GLUCOSE BLDC GLUCOMTR-MCNC: 112 MG/DL — HIGH (ref 70–99)
GLUCOSE BLDC GLUCOMTR-MCNC: 190 MG/DL — HIGH (ref 70–99)
GLUCOSE BLDC GLUCOMTR-MCNC: 196 MG/DL — HIGH (ref 70–99)
GLUCOSE SERPL-MCNC: 109 MG/DL — HIGH (ref 70–99)
HCT VFR BLD CALC: 29.9 % — LOW (ref 34.5–45)
HGB BLD-MCNC: 8.9 G/DL — LOW (ref 11.5–15.5)
MAGNESIUM SERPL-MCNC: 1.3 MG/DL — LOW (ref 1.6–2.6)
MCHC RBC-ENTMCNC: 27.1 PG — SIGNIFICANT CHANGE UP (ref 27–34)
MCHC RBC-ENTMCNC: 29.8 GM/DL — LOW (ref 32–36)
MCV RBC AUTO: 91.2 FL — SIGNIFICANT CHANGE UP (ref 80–100)
METHOD TYPE: SIGNIFICANT CHANGE UP
METHOD TYPE: SIGNIFICANT CHANGE UP
NRBC # BLD: 0 /100 WBCS — SIGNIFICANT CHANGE UP (ref 0–0)
PHOSPHATE SERPL-MCNC: 3.7 MG/DL — SIGNIFICANT CHANGE UP (ref 2.5–4.5)
PLATELET # BLD AUTO: 159 K/UL — SIGNIFICANT CHANGE UP (ref 150–400)
POTASSIUM SERPL-MCNC: 4.2 MMOL/L — SIGNIFICANT CHANGE UP (ref 3.5–5.3)
POTASSIUM SERPL-SCNC: 4.2 MMOL/L — SIGNIFICANT CHANGE UP (ref 3.5–5.3)
PROT SERPL-MCNC: 6.3 G/DL — SIGNIFICANT CHANGE UP (ref 6–8.3)
RBC # BLD: 3.28 M/UL — LOW (ref 3.8–5.2)
RBC # FLD: 16.4 % — HIGH (ref 10.3–14.5)
SODIUM SERPL-SCNC: 139 MMOL/L — SIGNIFICANT CHANGE UP (ref 135–145)
WBC # BLD: 6.08 K/UL — SIGNIFICANT CHANGE UP (ref 3.8–10.5)
WBC # FLD AUTO: 6.08 K/UL — SIGNIFICANT CHANGE UP (ref 3.8–10.5)

## 2022-11-08 PROCEDURE — 73630 X-RAY EXAM OF FOOT: CPT | Mod: 26,50

## 2022-11-08 PROCEDURE — 71045 X-RAY EXAM CHEST 1 VIEW: CPT | Mod: 26

## 2022-11-08 PROCEDURE — 99233 SBSQ HOSP IP/OBS HIGH 50: CPT

## 2022-11-08 RX ORDER — MAGNESIUM SULFATE 500 MG/ML
2 VIAL (ML) INJECTION ONCE
Refills: 0 | Status: COMPLETED | OUTPATIENT
Start: 2022-11-08 | End: 2022-11-08

## 2022-11-08 RX ORDER — SODIUM CHLORIDE 9 MG/ML
10 INJECTION INTRAMUSCULAR; INTRAVENOUS; SUBCUTANEOUS
Refills: 0 | Status: DISCONTINUED | OUTPATIENT
Start: 2022-11-08 | End: 2022-11-10

## 2022-11-08 RX ORDER — CHLORHEXIDINE GLUCONATE 213 G/1000ML
1 SOLUTION TOPICAL
Refills: 0 | Status: DISCONTINUED | OUTPATIENT
Start: 2022-11-08 | End: 2022-11-09

## 2022-11-08 RX ADMIN — Medication 265.62 MILLIGRAM(S): at 22:16

## 2022-11-08 RX ADMIN — Medication 2 MILLIGRAM(S): at 07:43

## 2022-11-08 RX ADMIN — Medication 25 GRAM(S): at 10:58

## 2022-11-08 RX ADMIN — Medication 2 MILLIGRAM(S): at 03:34

## 2022-11-08 RX ADMIN — Medication 1 MILLIGRAM(S): at 23:31

## 2022-11-08 RX ADMIN — SIMETHICONE 80 MILLIGRAM(S): 80 TABLET, CHEWABLE ORAL at 23:20

## 2022-11-08 RX ADMIN — Medication 1 MILLIGRAM(S): at 17:29

## 2022-11-08 RX ADMIN — HEPARIN SODIUM 5000 UNIT(S): 5000 INJECTION INTRAVENOUS; SUBCUTANEOUS at 17:16

## 2022-11-08 RX ADMIN — Medication 2 MILLIGRAM(S): at 16:13

## 2022-11-08 RX ADMIN — Medication 2 MILLIGRAM(S): at 20:37

## 2022-11-08 RX ADMIN — Medication 1 MILLIGRAM(S): at 05:58

## 2022-11-08 RX ADMIN — PANTOPRAZOLE SODIUM 40 MILLIGRAM(S): 20 TABLET, DELAYED RELEASE ORAL at 06:05

## 2022-11-08 RX ADMIN — MIDODRINE HYDROCHLORIDE 10 MILLIGRAM(S): 2.5 TABLET ORAL at 17:15

## 2022-11-08 RX ADMIN — CHLORHEXIDINE GLUCONATE 15 MILLILITER(S): 213 SOLUTION TOPICAL at 17:16

## 2022-11-08 RX ADMIN — HEPARIN SODIUM 5000 UNIT(S): 5000 INJECTION INTRAVENOUS; SUBCUTANEOUS at 05:57

## 2022-11-08 RX ADMIN — Medication 1: at 23:29

## 2022-11-08 RX ADMIN — Medication 1 APPLICATION(S): at 12:12

## 2022-11-08 RX ADMIN — SIMETHICONE 80 MILLIGRAM(S): 80 TABLET, CHEWABLE ORAL at 05:57

## 2022-11-08 RX ADMIN — SENNA PLUS 2 TABLET(S): 8.6 TABLET ORAL at 22:16

## 2022-11-08 RX ADMIN — Medication 1: at 17:25

## 2022-11-08 RX ADMIN — CHLORHEXIDINE GLUCONATE 15 MILLILITER(S): 213 SOLUTION TOPICAL at 05:58

## 2022-11-08 RX ADMIN — Medication 115.62 MILLIGRAM(S): at 15:10

## 2022-11-08 RX ADMIN — SIMETHICONE 80 MILLIGRAM(S): 80 TABLET, CHEWABLE ORAL at 17:16

## 2022-11-08 RX ADMIN — Medication 100 MILLIGRAM(S): at 05:56

## 2022-11-08 RX ADMIN — Medication 100 MILLIGRAM(S): at 17:16

## 2022-11-08 RX ADMIN — MIDODRINE HYDROCHLORIDE 10 MILLIGRAM(S): 2.5 TABLET ORAL at 05:57

## 2022-11-08 NOTE — PROGRESS NOTE ADULT - SUBJECTIVE AND OBJECTIVE BOX
BREA BECKHAM     [facility] 02    Allergies    codeine (Hives)    Intolerances        PAST MEDICAL & SURGICAL HISTORY:  Dementia of frontal lobe type      Aphasic stroke      Diabetes mellitus      Respiratory failure      Hypertension      GERD (gastroesophageal reflux disease)      Constipation      Respiratory failure      CVA (cerebral vascular accident)      HTN (hypertension)      DM (diabetes mellitus)      Advanced dementia      COVID-19 virus detected      Quadriplegia      Pneumonia      Type II diabetes mellitus      Hx of appendectomy      Gastrostomy in place      Tracheostomy in place      Tracheostomy tube present      Feeding by G-tube          FAMILY HISTORY:  No pertinent family history in first degree relatives        Home Medications:  albuterol 90 mcg/inh inhalation aerosol with adapter: 2  inhaled every 6 hours (18 Oct 2022 11:42)  Bacid (LAC) oral tablet: 2 tab(s) by gastrostomy tube once a day (18 Oct 2022 11:42)  Betadine 10% topical swab: cleanse right and left great toes (18 Oct 2022 11:42)  chlorhexidine 0.12% mucous membrane liquid: 15 milliliter(s) mucous membrane 2 times a day (18 Oct 2022 11:42)  Eucerin topical cream: Apply topically to affected area once a day bilateral feet (18 Oct 2022 11:42)  insulin glargine 100 units/mL subcutaneous solution: 8 unit(s) subcutaneous once a day (in the morning) (18 Oct 2022 11:42)  ipratropium-albuterol 0.5 mg-2.5 mg/3 mL inhalation solution: 3 milliliter(s) inhaled 4 times a day (18 Oct 2022 11:42)  LORazepam 1 mg oral tablet: 1 tab(s) by gastrostomy tube every 4 hours (18 Oct 2022 11:42)  midodrine 10 mg oral tablet: 1 tab(s) orally every 8 hours (02 Nov 2022 11:44)  MiraLax oral powder for reconstitution: g-tube (18 Oct 2022 13:04)  Multiple Vitamins oral tablet: 1 tab(s) orally once a day (18 Oct 2022 11:42)  nystatin 100,000 units/g topical powder: 1 application topically 3 times a day (18 Oct 2022 11:42)  omeprazole 20 mg oral delayed release capsule: orally 2 times a day (18 Oct 2022 11:42)  polyethylene glycol 3350 oral powder for reconstitution: 17 gram(s) by gastrostomy tube every 12 hours (18 Oct 2022 11:42)  senna 8.6 mg oral tablet: 3 tab(s) by gastrostomy tube once a day (at bedtime) (18 Oct 2022 11:42)  simethicone 80 mg oral tablet, chewable: 1 tab(s) by gastrostomy tube every 6 hours (18 Oct 2022 11:42)  sucralfate 1 g/10 mL oral suspension: 10 milliliter(s) g-tube 4 times a day (before meals and at bedtime) (18 Oct 2022 13:04)  Tylenol 325 mg oral tablet: 2 tab(s) by gastrostomy tube once a day; 60 minutes prior to dressing change  (18 Oct 2022 11:42)  Tylenol 325 mg oral tablet: 2 tab(s) by gastrostomy tube every 6 hours, As Needed (18 Oct 2022 11:42)      MEDICATIONS  (STANDING):  cadexomer iodine 0.9% Gel 1 Application(s) Topical <User Schedule>  chlorhexidine 0.12% Liquid 15 milliLiter(s) Oral Mucosa every 12 hours  chlorhexidine 2% Cloths 1 Application(s) Topical daily  dextrose 5%. 1000 milliLiter(s) (100 mL/Hr) IV Continuous <Continuous>  dextrose 5%. 1000 milliLiter(s) (50 mL/Hr) IV Continuous <Continuous>  dextrose 50% Injectable 25 Gram(s) IV Push once  dextrose 50% Injectable 12.5 Gram(s) IV Push once  dextrose 50% Injectable 25 Gram(s) IV Push once  doxycycline IVPB      doxycycline IVPB 100 milliGRAM(s) IV Intermittent every 12 hours  glucagon  Injectable 1 milliGRAM(s) IntraMuscular once  heparin   Injectable 5000 Unit(s) SubCutaneous every 12 hours  insulin lispro (ADMELOG) corrective regimen sliding scale   SubCutaneous every 6 hours  lactobacillus acidophilus 1 Tablet(s) Oral daily  levoFLOXacin IVPB 750 milliGRAM(s) IV Intermittent every 24 hours  LORazepam     Tablet 1 milliGRAM(s) Oral four times a day  midodrine. 10 milliGRAM(s) Oral three times a day  pantoprazole    Tablet 40 milliGRAM(s) Oral before breakfast  polyethylene glycol 3350 17 Gram(s) Oral daily  povidone iodine 10% Solution 1 Application(s) Topical daily  senna 2 Tablet(s) Oral at bedtime  simethicone 80 milliGRAM(s) Chew every 6 hours    MEDICATIONS  (PRN):  acetaminophen     Tablet .. 650 milliGRAM(s) Oral every 6 hours PRN Temp greater or equal to 38C (100.4F), Mild Pain (1 - 3)  bisacodyl Suppository 10 milliGRAM(s) Rectal daily PRN Constipation  dextrose Oral Gel 15 Gram(s) Oral once PRN Blood Glucose LESS THAN 70 milliGRAM(s)/deciliter  LORazepam   Injectable 2 milliGRAM(s) IV Push every 4 hours PRN Agitation  ondansetron Injectable 4 milliGRAM(s) IV Push every 8 hours PRN Nausea and/or Vomiting      Diet, NPO with Tube Feed:   Tube Feeding Modality: Gastrostomy  Pulmocare  Total Volume for 24 Hours (mL): 840  Continuous  Starting Tube Feed Rate mL per Hour: 20  Increase Tube Feed Rate by (mL): 10     Every 8 hours  Until Goal Tube Feed Rate (mL per Hour): 35  Tube Feed Duration (in Hours): 24  Tube Feed Start Time: 08:00  Free Water Flush   Total Volume per Flush (mL): 250   Frequency: Every 6 Hours   Total Daily Volume of Flush (mL): 100    Start Time: 08:00  Ulcas(7 Gm Arginine/7 Gm Glut/1.2 Gm HMB     Qty per Day:  1 (11-06-22 @ 19:28) [Active]  Diet, NPO with Tube Feed:   Tube Feeding Modality: Gastrostomy  Pulmocare  Total Volume for 24 Hours (mL): 840  Continuous  Starting Tube Feed Rate mL per Hour: 20  Increase Tube Feed Rate by (mL): 10     Every 8 hours  Until Goal Tube Feed Rate (mL per Hour): 35  Tube Feed Duration (in Hours): 24  Tube Feed Start Time: 12:00  Free Water Flush  Bolus   Total Volume per Flush (mL): 250   Frequency: Every 6 Hours  Lucas(7 Gm Arginine/7 Gm Glut/1.2 Gm HMB     Qty per Day:  1  No Carb Prosource TF     Qty per Day:  1 (11-05-22 @ 15:04) [Pending Verification By Attending]          Vital Signs Last 24 Hrs  T(C): 36.9 (08 Nov 2022 03:28), Max: 36.9 (07 Nov 2022 20:00)  T(F): 98.4 (08 Nov 2022 03:28), Max: 98.4 (07 Nov 2022 20:00)  HR: 57 (08 Nov 2022 09:00) (57 - 107)  BP: 118/100 (08 Nov 2022 08:00) (101/61 - 140/74)  BP(mean): 107 (08 Nov 2022 08:00) (72 - 107)  RR: 16 (08 Nov 2022 09:00) (16 - 42)  SpO2: 97% (08 Nov 2022 06:46) (94% - 100%)    Parameters below as of 08 Nov 2022 07:00  Patient On (Oxygen Delivery Method): ventilator          11-07-22 @ 07:01  -  11-08-22 @ 07:00  --------------------------------------------------------  IN: 1835 mL / OUT: 1400 mL / NET: 435 mL        Mode: AC/ CMV (Assist Control/ Continuous Mandatory Ventilation), RR (machine): 16, TV (machine): 400, FiO2: 40, PEEP: 5, ITime: 1, MAP: 12, PIP: 32      LABS:                        8.9    6.08  )-----------( 159      ( 08 Nov 2022 06:53 )             29.9     11-08    139  |  104  |  15  ----------------------------<  109<H>  4.2   |  27  |  1.03    Ca    7.8<L>      08 Nov 2022 06:53  Phos  3.7     11-08  Mg     1.3     11-08    TPro  6.3  /  Alb  1.7<L>  /  TBili  0.5  /  DBili  x   /  AST  20  /  ALT  <10<L>  /  AlkPhos  108  11-08              WBC:  WBC Count: 6.08 K/uL (11-08 @ 06:53)  WBC Count: 6.86 K/uL (11-07 @ 05:41)  WBC Count: 10.04 K/uL (11-06 @ 06:27)  WBC Count: 9.13 K/uL (11-05 @ 06:34)  WBC Count: 10.99 K/uL (11-04 @ 13:48)      MICROBIOLOGY:  RECENT CULTURES:  11-05 Trach Asp Tracheal Aspirate XXXX   Few Squamous epithelial cells per low power field  Few polymorphonuclear leukocytes per low power field  Rare Gram Positive Rods per oil power field  Rare Gram Negative Rods per oil power field   Numerous Stenotrophomonas maltophilia Susceptibility to follow.    11-04 Clean Catch Clean Catch (Midstream) Enterococcus faecium (vancomycin resistant) XXXX   >100,000 CFU/ml Enterococcus faecium (vancomycin resistant)  50,000 - 99,000 CFU/mL Candida albicans "Susceptibilities not performed"    11-04 .Blood Blood-Peripheral XXXX XXXX   No growth to date.                    Sodium:  Sodium, Serum: 139 mmol/L (11-08 @ 06:53)  Sodium, Serum: 135 mmol/L (11-07 @ 05:41)  Sodium, Serum: 136 mmol/L (11-06 @ 06:27)  Sodium, Serum: 138 mmol/L (11-05 @ 06:34)  Sodium, Serum: 138 mmol/L (11-04 @ 13:48)      1.03 mg/dL 11-08 @ 06:53  1.12 mg/dL 11-07 @ 05:41  0.93 mg/dL 11-06 @ 06:27  0.91 mg/dL 11-05 @ 06:34  0.94 mg/dL 11-04 @ 13:48      Hemoglobin:  Hemoglobin: 8.9 g/dL (11-08 @ 06:53)  Hemoglobin: 8.8 g/dL (11-07 @ 05:41)  Hemoglobin: 10.2 g/dL (11-06 @ 06:27)  Hemoglobin: 9.4 g/dL (11-05 @ 06:34)  Hemoglobin: 10.5 g/dL (11-04 @ 13:48)      Platelets: Platelet Count - Automated: 159 K/uL (11-08 @ 06:53)  Platelet Count - Automated: 148 K/uL (11-07 @ 05:41)  Platelet Count - Automated: 180 K/uL (11-06 @ 06:27)  Platelet Count - Automated: 184 K/uL (11-05 @ 06:34)  Platelet Count - Automated: 184 K/uL (11-04 @ 13:48)      LIVER FUNCTIONS - ( 08 Nov 2022 06:53 )  Alb: 1.7 g/dL / Pro: 6.3 g/dL / ALK PHOS: 108 U/L / ALT: <10 U/L DA / AST: 20 U/L / GGT: x                 RADIOLOGY & ADDITIONAL STUDIES:      MICROBIOLOGY:  RECENT CULTURES:  11-05 Trach Asp Tracheal Aspirate XXXX   Few Squamous epithelial cells per low power field  Few polymorphonuclear leukocytes per low power field  Rare Gram Positive Rods per oil power field  Rare Gram Negative Rods per oil power field   Numerous Stenotrophomonas maltophilia Susceptibility to follow.    11-04 Clean Catch Clean Catch (Midstream) Enterococcus faecium (vancomycin resistant) XXXX   >100,000 CFU/ml Enterococcus faecium (vancomycin resistant)  50,000 - 99,000 CFU/mL Candida albicans "Susceptibilities not performed"    11-04 .Blood Blood-Peripheral XXXX XXXX   No growth to date.

## 2022-11-08 NOTE — PROGRESS NOTE ADULT - SUBJECTIVE AND OBJECTIVE BOX
[facility], Division of Infectious Diseases  MOIZ Lora Y. Patel, S. Shah, [facility]  [phone]    Name: BREA EBCKHAM  Age: 76y  Gender: Female  MRN: [id number]    Interval History:  Patient seen and examined at bedside  No acute overnight events. Afebrile  Notes reviewed    Antibiotics:  doxycycline IVPB      doxycycline IVPB 100 milliGRAM(s) IV Intermittent every 12 hours  trimethoprim / sulfamethoxazole IVPB 250 milliGRAM(s) IV Intermittent every 8 hours      Medications:  acetaminophen     Tablet .. 650 milliGRAM(s) Oral every 6 hours PRN  bisacodyl Suppository 10 milliGRAM(s) Rectal daily PRN  cadexomer iodine 0.9% Gel 1 Application(s) Topical <User Schedule>  chlorhexidine 0.12% Liquid 15 milliLiter(s) Oral Mucosa every 12 hours  chlorhexidine 2% Cloths 1 Application(s) Topical daily  dextrose 5%. 1000 milliLiter(s) IV Continuous <Continuous>  dextrose 5%. 1000 milliLiter(s) IV Continuous <Continuous>  dextrose 50% Injectable 25 Gram(s) IV Push once  dextrose 50% Injectable 12.5 Gram(s) IV Push once  dextrose 50% Injectable 25 Gram(s) IV Push once  dextrose Oral Gel 15 Gram(s) Oral once PRN  doxycycline IVPB      doxycycline IVPB 100 milliGRAM(s) IV Intermittent every 12 hours  glucagon  Injectable 1 milliGRAM(s) IntraMuscular once  heparin   Injectable 5000 Unit(s) SubCutaneous every 12 hours  insulin lispro (ADMELOG) corrective regimen sliding scale   SubCutaneous every 6 hours  lactobacillus acidophilus 1 Tablet(s) Oral daily  LORazepam     Tablet 1 milliGRAM(s) Oral four times a day  LORazepam   Injectable 2 milliGRAM(s) IV Push every 4 hours PRN  midodrine. 10 milliGRAM(s) Oral three times a day  ondansetron Injectable 4 milliGRAM(s) IV Push every 8 hours PRN  pantoprazole    Tablet 40 milliGRAM(s) Oral before breakfast  polyethylene glycol 3350 17 Gram(s) Oral daily  povidone iodine 10% Solution 1 Application(s) Topical daily  senna 2 Tablet(s) Oral at bedtime  simethicone 80 milliGRAM(s) Chew every 6 hours  trimethoprim / sulfamethoxazole IVPB 250 milliGRAM(s) IV Intermittent every 8 hours      Review of Systems:  unable to obtain    Allergies: codeine (Hives)    For details regarding the patient's past medical history, social history, family history, and other miscellaneous elements, please refer the initial infectious diseases consultation and/or the admitting history and physical examination for this admission.    Objective:  Vitals:   T(C): 36.7 (11-08-22 @ 08:30), Max: 36.9 (11-07-22 @ 20:00)  HR: 61 (11-08-22 @ 12:00) (57 - 107)  BP: 129/67 (11-08-22 @ 12:00) (101/61 - 140/74)  RR: 16 (11-08-22 @ 12:00) (16 - 42)  SpO2: 99% (11-08-22 @ 10:31) (94% - 100%)    Physical Examination:  General: chronically ill appearing   Neck: trach  HEENT: NC/AT  Lungs: MV breath sounds  Heart: Regular rate and rhythm. No murmur, rub or gallop.  Abdomen: Soft. Nondistended. Nontender. PGT in place  Skin: Warm. Dry. Good turgor      Laboratory Studies:  CBC:                       8.9    6.08  )-----------( 159      ( 08 Nov 2022 06:53 )             29.9     CMP: 11-08    139  |  104  |  15  ----------------------------<  109<H>  4.2   |  27  |  1.03    Ca    7.8<L>      08 Nov 2022 06:53  Phos  3.7     11-08  Mg     1.3     11-08    TPro  6.3  /  Alb  1.7<L>  /  TBili  0.5  /  DBili  x   /  AST  20  /  ALT  <10<L>  /  AlkPhos  108  11-08    LIVER FUNCTIONS - ( 08 Nov 2022 06:53 )  Alb: 1.7 g/dL / Pro: 6.3 g/dL / ALK PHOS: 108 U/L / ALT: <10 U/L DA / AST: 20 U/L / GGT: x               Microbiology: reviewed    Culture - Sputum (collected 11-05-22 @ 06:39)  Source: Trach Asp Tracheal Aspirate  Gram Stain (11-05-22 @ 14:45):    Few Squamous epithelial cells per low power field    Few polymorphonuclear leukocytes per low power field    Rare Gram Positive Rods per oil power field    Rare Gram Negative Rods per oil power field  Preliminary Report (11-07-22 @ 10:10):    Numerous Stenotrophomonas maltophilia Susceptibility to follow.  Organism: Stenotrophomonas maltophilia (11-08-22 @ 12:00)      -  Minocycline: [id number]      Method Type: KB  Organism: Stenotrophomonas maltophilia (11-08-22 @ 12:00)  Organism: Stenotrophomonas maltophilia (11-08-22 @ 12:00)      -  Levofloxacin: I 4      -  Trimethoprim/Sulfamethoxazole: S <=0.5/9.5      Method Type: PRATIK    Culture - Urine (collected 11-04-22 @ 13:54)  Source: Clean Catch Clean Catch (Midstream)  Final Report (11-07-22 @ 10:15):    >100,000 CFU/ml Enterococcus faecium (vancomycin resistant)    50,000 - 99,000 CFU/mL Candida albicans "Susceptibilities not performed"  Organism: Enterococcus faecium (vancomycin resistant) (11-07-22 @ 10:15)  Organism: Enterococcus faecium (vancomycin resistant) (11-07-22 @ 10:15)      -  Ampicillin: R >8 Predicts results to ampicillin/sulbactam, amoxacillin-clavulanate and  piperacillin-tazobactam.      -  Ciprofloxacin: R >2      -  Daptomycin: SDD 4 The breakpoint for SDD (sensitive dose dependent)is based on a dosage regimen of 8-12 mg/kg administered every 24 h in adults and is intended for serious infections due to E. faecium. Consultation with an infectious diseases specialist is recommended.      -  Levofloxacin: R >4      -  Linezolid: S 2      -  Nitrofurantoin: R >64 Should not be used to treat pyelonephritis.      -  Tetra/Doxy: S <=4      -  Vancomycin: R >16      Method Type: PRATIK    Culture - Blood (collected 11-04-22 @ 13:52)  Source: .Blood Blood-Peripheral  Preliminary Report (11-05-22 @ 20:02):    No growth to date.    Culture - Blood (collected 11-04-22 @ 13:52)  Source: .Blood Blood-Peripheral  Preliminary Report (11-05-22 @ 20:02):    No growth to date.          Radiology: reviewed

## 2022-11-08 NOTE — PROCEDURE NOTE - ADDITIONAL PROCEDURE DETAILS
Dx: Sepsis, acute on chronic hypoxic respiratory failure    Attempted 2 midlines w/o success. Patient needs femoral line to be removed. Only access that could be obtained at this time was another CVC, now in [facility]

## 2022-11-08 NOTE — PROGRESS NOTE ADULT - ASSESSMENT
REVIEW OF SYMPTOMS      Able to give (reliable) ROS  NO     PHYSICAL EXAM    HEENT Unremarkable  atraumatic   RESP Fair air entry EXP prolonged    Harsh breath sound Resp distres mild   CARDIAC S1 S2 No S3     NO JVD    ABDOMEN SOFT BS PRESENT NOT DISTENDED No hepatosplenomegaly   PEDAL EDEMA present No calf tenderness  NO rash       GENERAL DATA .   GOC.        ALLGY. codeine                           WT.   BMI.                              ICU STAY. 11/4/2022  COVID.  11/4 scv2 (-)     BEST PRACTICE ISSUES.    HOB ELEVATN. Yes  DVT PPLX.  11/4/2022 hpsc    SQUIRES PPLX.  11/4/2022 protonix 40     INFN PPLX.    11/5/2022 chlorhexidine 2%   11/5/2022 chlorhexidine .12%   SP SW EM.        DIET. 11/5/2022 jevity 1.5 30/h gt    VS/ PO/IO/ VENT/ DRIPS.  11/8/2022 70 170/70   11/8/2022 ac 16/400/5/.4     OVERALL ASSESSMENT/DISPOSITION.  76 f PMH trach peg cva cac anoxic encephalo PH 8/19/2022 pasp 58 bl pl effs  r thora 6/8/2022 and l thora 5/25/2022 were lp exudates  recurrent hospitalizations HO CR psuedomonas 5/2/2022 zosyn 8/19/2022  recent admission 10/18-11/2/2022 uc 10/18 100K E coli  sp 10/19 Stenotrophomonas  10/19-10/26/2022 levaquin 750  10/21/2022 rocephin x 7d Dr BRITTANY Brantley sent back from [facility] 11/4/2022 with fever   Pulm crit care consulted 11/4/2022    .. VAP   w 11/4-11/5-11/7/2022 w 10.9 - 9.1 - 6.8   pr 11/6/2022 .6   ua 11/4/2022 w 11-25   cxr 11/4/2022 persistent advanced bl infiltr cw 11/4/2022 small basl effesns r gr  than l   rvp 11/4 (-)   trach 11/5 stenotrophomonas   uc 11/4 vr enterococc 50-99 candida   bc 11/4 (-)   11/4/2022 levaquin 750 dced  11/7 doxy   11/8 bactrim 250.3     .. Resp distress  11/4 v duplx (-)   On vent fio2 40       TIME SPENT   Over 39 minutes aggregate critical care time spent on encounter; activities included   direct patient care, counseling and/or coordinating care reviewing notes, lab data/ imaging , discussion with multidisciplinary team/ patient  /family and explaining in detail risks, benefits, alternatives  of the recommendations     CHAPINCITO GUIDRY 76 f [facility] S 11/4/2022   DR HIMANSHU RANDHAWA

## 2022-11-08 NOTE — PROGRESS NOTE ADULT - ASSESSMENT
Assessment:  76 year old female with PMHx of dementia, COPD with chronic respiratory failure s/p trach, cardiac arrest, CHH, quadriplegia, CVA, CKD, anemia, PEG tube, and multiple hospital admissions for respiratory distress a/w sepsis and acute on chronic hypoxic respiratory failure due to suspected recurrent VAP    Plan:  - Stenotrophomonas maltophilia from sputum & Enterococcus faecium (vancomycin resistant). C/w Bactrim & Doxy. ID reccs greatly appreciated   - Acute on chronic respiratory failure. Patient currently on Full vent support. Titrate settings to maintain SaO2 >90%, or pH >7.25. Consider low tidal volume ventilation strategy w/ goal Tv 4-6 cc/kg of ideal body weight. Plateau pressure goal <30. Peridex oral care. Aggressive pulmonary toilet  - Tube feeds, PPI   - Strict I/Os, goal UOP >0.5cc/kg/hr. Trend renal function and electrolytes, replete as needed. Avoid nephrotoxic agents  - HSQ for DVT prophylaxis  - Ativan for vent synchrony   - Goal blood glucose <180, c/w ISS   - Monitor HR and BP. C/w Midodrine     Patient will need midline placed by IR, 2 attempts made by me w/o success

## 2022-11-08 NOTE — PROGRESS NOTE ADULT - SUBJECTIVE AND OBJECTIVE BOX
Patient is a 76y old  Female who presents with a chief complaint of diabetic ulcerations hallux bilaterally rule out OM (08 Nov 2022 08:43)    BRIEF HOSPITAL COURSE:   76 year old female with PMHx of dementia, COPD with chronic respiratory failure s/p trach, cardiac arrest, CHH, quadriplegia, CVA, CKD, anemia, PEG tube, and multiple hospital admissions for respiratory distress a/w sepsis and acute on chronic hypoxic respiratory failure due to suspected recurrent VAP.    Events last 24 hours:   - Patient w/groin line  - Switched CVC to [facility]     Review of Systems:  Unable to assess given clinical condition     PAST MEDICAL & SURGICAL HISTORY:  Dementia of frontal lobe type  Aphasic stroke  Diabetes mellitus  Respiratory failure  Hypertension  GERD (gastroesophageal reflux disease)  Constipation  Respiratory failure  CVA (cerebral vascular accident)  HTN (hypertension)  DM (diabetes mellitus)  Advanced dementia  COVID-19 virus detected  Quadriplegia          Type II diabetes mellitus      Hx of appendectomy      Gastrostomy in place      Tracheostomy in place      Tracheostomy tube present      Feeding by G-tube          Medications:  doxycycline IVPB      doxycycline IVPB 100 milliGRAM(s) IV Intermittent every 12 hours  trimethoprim / sulfamethoxazole IVPB 250 milliGRAM(s) IV Intermittent every 8 hours    midodrine. 10 milliGRAM(s) Oral three times a day      acetaminophen     Tablet .. 650 milliGRAM(s) Oral every 6 hours PRN  LORazepam     Tablet 1 milliGRAM(s) Oral four times a day  LORazepam   Injectable 2 milliGRAM(s) IV Push every 4 hours PRN  ondansetron Injectable 4 milliGRAM(s) IV Push every 8 hours PRN      heparin   Injectable 5000 Unit(s) SubCutaneous every 12 hours    bisacodyl Suppository 10 milliGRAM(s) Rectal daily PRN  pantoprazole    Tablet 40 milliGRAM(s) Oral before breakfast  polyethylene glycol 3350 17 Gram(s) Oral daily  senna 2 Tablet(s) Oral at bedtime  simethicone 80 milliGRAM(s) Chew every 6 hours      dextrose 50% Injectable 25 Gram(s) IV Push once  dextrose 50% Injectable 12.5 Gram(s) IV Push once  dextrose 50% Injectable 25 Gram(s) IV Push once  dextrose Oral Gel 15 Gram(s) Oral once PRN  glucagon  Injectable 1 milliGRAM(s) IntraMuscular once  insulin lispro (ADMELOG) corrective regimen sliding scale   SubCutaneous every 6 hours    dextrose 5%. 1000 milliLiter(s) IV Continuous <Continuous>  dextrose 5%. 1000 milliLiter(s) IV Continuous <Continuous>  sodium chloride 0.9% lock flush 10 milliLiter(s) IV Push every 1 hour PRN      cadexomer iodine 0.9% Gel 1 Application(s) Topical <User Schedule>  chlorhexidine 0.12% Liquid 15 milliLiter(s) Oral Mucosa every 12 hours  chlorhexidine 2% Cloths 1 Application(s) Topical daily  chlorhexidine 4% Liquid 1 Application(s) Topical <User Schedule>  povidone iodine 10% Solution 1 Application(s) Topical daily    lactobacillus acidophilus 1 Tablet(s) Oral daily      Mode: AC/ CMV (Assist Control/ Continuous Mandatory Ventilation)  RR (machine): 16  TV (machine): 400  FiO2: 50  PEEP: 5  ITime: 1  MAP: 12  PIP: 32      ICU Vital Signs Last 24 Hrs  T(C): 36.1 (08 Nov 2022 16:12), Max: 36.9 (08 Nov 2022 03:28)  T(F): 97 (08 Nov 2022 16:12), Max: 98.4 (08 Nov 2022 03:28)  HR: 94 (08 Nov 2022 20:52) (57 - 94)  BP: 171/75 (08 Nov 2022 20:52) (102/81 - 171/75)  BP(mean): 90 (08 Nov 2022 16:00) (76 - 107)  ABP: --  ABP(mean): --  RR: 22 (08 Nov 2022 20:52) (16 - 32)  SpO2: 100% (08 Nov 2022 19:44) (97% - 100%)    O2 Parameters below as of 08 Nov 2022 07:00  Patient On (Oxygen Delivery Method): ventilator                I&O's Detail    07 Nov 2022 07:01  -  08 Nov 2022 07:00  --------------------------------------------------------  IN:    Enteral Tube Flush: 1000 mL    IV PiggyBack: 100 mL    Pulmocare: 735 mL  Total IN: 1835 mL    OUT:    Voided (mL): 1400 mL  Total OUT: 1400 mL    Total NET: 435 mL      08 Nov 2022 07:01  -  08 Nov 2022 22:01  --------------------------------------------------------  IN:    Enteral Tube Flush: 350 mL    IV PiggyBack: 50 mL    IV PiggyBack: 100 mL    IV PiggyBack: 100 mL    Pulmocare: 420 mL  Total IN: 1020 mL    OUT:    Voided (mL): 500 mL  Total OUT: 500 mL    Total NET: 520 mL          LABS:                        8.9    6.08  )-----------( 159      ( 08 Nov 2022 06:53 )             29.9     11-08    139  |  104  |  15  ----------------------------<  109<H>  4.2   |  27  |  1.03    Ca    7.8<L>      08 Nov 2022 06:53  Phos  3.7     11-08  Mg     1.3     11-08    TPro  6.3  /  Alb  1.7<L>  /  TBili  0.5  /  DBili  x   /  AST  20  /  ALT  <10<L>  /  AlkPhos  108  11-08          CAPILLARY BLOOD GLUCOSE      POCT Blood Glucose.: 190 mg/dL (08 Nov 2022 17:21)        CULTURES:  Culture Results:   Numerous Stenotrophomonas maltophilia Susceptibility to follow. (11-05 @ 06:39)  Rapid RVP Result: NotDetec (11-05 @ 02:05)  Culture Results:   >100,000 CFU/ml Enterococcus faecium (vancomycin resistant)  50,000 - 99,000 CFU/mL Candida albicans "Susceptibilities not performed" (11-04 @ 13:54)  Culture Results:   No growth to date. (11-04 @ 13:52)  Culture Results:   No growth to date. (11-04 @ 13:52)      Physical Examination:  General: No acute distress.    HEENT: Pupils equal, reactive to light.  Symmetric.  PULM: Diminished breath sounds b/l  NECK: Supple, no lymphadenopathy, trachea midline  CVS: Regular rate and rhythm, no murmurs, rubs, or gallops  ABD: Soft, nondistended, nontender, normoactive bowel sounds, no masses  EXT: No edema, nontender  SKIN: Warm and well perfused  RADIOLOGY:  < from: Xray Chest 1 View- PORTABLE-Urgent (11.04.22 @ 13:51) >    ACC: [id number] EXAM:  XR CHEST PORTABLE URGENT 1V                          PROCEDURE DATE:  11/04/2022      INTERPRETATION:  AP supine chest on November 4, 2022 at 1:36 PM. Patient   has sepsis.    Heart size normal. Tracheostomy again noted. Right jugular line on   October 19 is been removed.    There are persistent advanced bilateral infiltrates a small basilar   effusions right greater than left.    Lungs are rather unchanged compared to October 19 this year.    IMPRESSION: As above.    --- End of Report ---    HANSEL ANDRADE MD; Attending Radiologist  This document has been electronically signed. Nov 4 2022  1:54PM    < end of copied text >

## 2022-11-08 NOTE — PROGRESS NOTE ADULT - SUBJECTIVE AND OBJECTIVE BOX
Chief Complaint: Fever, respiratory distress    Interval Events: No events overnight.    Review of Systems:  Unable to obtain    Physical Exam:  Vital Signs Last 24 Hrs  T(C): 36.9 (08 Nov 2022 03:28), Max: 36.9 (07 Nov 2022 20:00)  T(F): 98.4 (08 Nov 2022 03:28), Max: 98.4 (07 Nov 2022 20:00)  HR: 57 (08 Nov 2022 09:00) (57 - 107)  BP: 118/100 (08 Nov 2022 08:00) (101/61 - 140/74)  BP(mean): 107 (08 Nov 2022 08:00) (72 - 107)  RR: 16 (08 Nov 2022 09:00) (16 - 42)  SpO2: 97% (08 Nov 2022 06:46) (94% - 100%)  Parameters below as of 08 Nov 2022 07:00  Patient On (Oxygen Delivery Method): ventilator  General: NAD  HEENT: Trach  Neck: No JVD, no carotid bruit  Lungs: CTAB  CV: RRR, nl S1/S2, no M/R/G  Abdomen: S/NT/ND, +BS  Extremities: No LE edema, no cyanosis  Neuro: AAOx0  Skin: No rash    Labs:    11-08    139  |  104  |  15  ----------------------------<  109<H>  4.2   |  27  |  1.03    Ca    7.8<L>      08 Nov 2022 06:53  Phos  3.7     11-08  Mg     1.3     11-08    TPro  6.3  /  Alb  1.7<L>  /  TBili  0.5  /  DBili  x   /  AST  20  /  ALT  <10<L>  /  AlkPhos  108  11-08                        8.9    6.08  )-----------( 159      ( 08 Nov 2022 06:53 )             29.9       ECG/Telemetry: Sinus rhythm

## 2022-11-08 NOTE — PROGRESS NOTE ADULT - ASSESSMENT
76 year old female with PMHx of dementia, COPD with chronic respiratory failure s/p trach, cardiac arrest, CHH, quadriplegia, CVA, CKD, anemia, PEG tube, and multiple hospital admissions for respiratory distress presents to the ED BIBEMS from [facility] complaining of fever,    sepsis  chr resp failure  dementia  copd  atelectasis  dysphagia  peg  trach  cva  ckd  anemia    GOC documented  pt is full code   Marciano - HCP    on TF  ABX adjustment noted - ID follow up - cx noted - biomarkers -

## 2022-11-08 NOTE — PROGRESS NOTE ADULT - SUBJECTIVE AND OBJECTIVE BOX
PROGRESS NOTE   Patient is a 76y old  Female who presents with a chief complaint of Acute on Hypoxemic respiratory failure (07 Nov 2022 12:35) Pt also being seen for diabetic ulcerations hallux distal tips bilaterally.  Pt is unresponsive.       HPI:  76 year old female with PMHx of dementia, COPD with chronic respiratory failure s/p trach, cardiac arrest, CHH, quadriplegia, CVA, CKD, anemia, PEG tube, and multiple hospital admissions for respiratory distress presents to the ED BIBEMS from [facility] complaining of fever, SOB, and dark sputum output from trach. Pt was discharged 2 days ago after being admitted for respiratory distress and pneumonia. Unable to obtain further history secondary to dementia.    WBC 10.9  U/A Moderate leuks    CXR There are persistent advanced bilateral infiltrates a small basilar   effusions right greater than left.  Lungs are rather unchanged compared to October 19 this year.   (04 Nov 2022 18:01)      Vital Signs Last 24 Hrs  T(C): 36.9 (08 Nov 2022 03:28), Max: 36.9 (07 Nov 2022 20:00)  T(F): 98.4 (08 Nov 2022 03:28), Max: 98.4 (07 Nov 2022 20:00)  HR: 62 (08 Nov 2022 08:00) (58 - 107)  BP: 118/100 (08 Nov 2022 08:00) (101/61 - 140/74)  BP(mean): 107 (08 Nov 2022 08:00) (72 - 107)  RR: 32 (08 Nov 2022 08:00) (16 - 42)  SpO2: 97% (08 Nov 2022 06:46) (94% - 100%)    Parameters below as of 08 Nov 2022 07:00  Patient On (Oxygen Delivery Method): ventilator                              8.9    6.08  )-----------( 159      ( 08 Nov 2022 06:53 )             29.9               11-08    139  |  104  |  15  ----------------------------<  109<H>  4.2   |  27  |  1.03    Ca    7.8<L>      08 Nov 2022 06:53  Phos  3.7     11-08  Mg     1.3     11-08    TPro  6.3  /  Alb  1.7<L>  /  TBili  0.5  /  DBili  x   /  AST  20  /  ALT  <10<L>  /  AlkPhos  108  11-08      PHYSICAL EXAM  GEN: BREA BECKHAM is a  well-nourished, well developed 76y Female in no acute distress  LE Focused:  noted the ulcerative lesions distal aspect of the hallux bilaterally left greater than right are stable in nature. The left wound probes to bone  and there is clinical concern for underlying OM. The right wound does not probe to bone at this time, however, OM can not be ruled out. The ulcerations appear to be secondary to contracture of the hallux with pressure necrosis of the distal tips. There is decreased erythema and edema from previous levels. There is a guarded prognosis.

## 2022-11-08 NOTE — PROGRESS NOTE ADULT - SUBJECTIVE AND OBJECTIVE BOX
Date/Time Patient Seen:  		  Referring MD:   Data Reviewed	       Patient is a 76y old  Female who presents with a chief complaint of Acute on Hypoxemic respiratory failure (07 Nov 2022 12:35)      Subjective/HPI     PAST MEDICAL & SURGICAL HISTORY:  Dementia of frontal lobe type    Aphasic stroke    Diabetes mellitus    Respiratory failure    Hypertension    GERD (gastroesophageal reflux disease)    Constipation    Respiratory failure    CVA (cerebral vascular accident)    HTN (hypertension)    DM (diabetes mellitus)    Advanced dementia    COVID-19 virus detected    Quadriplegia    Pneumonia    Type II diabetes mellitus    Hx of appendectomy    Gastrostomy in place    Tracheostomy in place    Tracheostomy tube present    Feeding by G-tube          Medication list         MEDICATIONS  (STANDING):  cadexomer iodine 0.9% Gel 1 Application(s) Topical <User Schedule>  chlorhexidine 0.12% Liquid 15 milliLiter(s) Oral Mucosa every 12 hours  chlorhexidine 2% Cloths 1 Application(s) Topical daily  dextrose 5%. 1000 milliLiter(s) (100 mL/Hr) IV Continuous <Continuous>  dextrose 5%. 1000 milliLiter(s) (50 mL/Hr) IV Continuous <Continuous>  dextrose 50% Injectable 25 Gram(s) IV Push once  dextrose 50% Injectable 12.5 Gram(s) IV Push once  dextrose 50% Injectable 25 Gram(s) IV Push once  doxycycline IVPB      doxycycline IVPB 100 milliGRAM(s) IV Intermittent every 12 hours  glucagon  Injectable 1 milliGRAM(s) IntraMuscular once  heparin   Injectable 5000 Unit(s) SubCutaneous every 12 hours  insulin lispro (ADMELOG) corrective regimen sliding scale   SubCutaneous every 6 hours  lactobacillus acidophilus 1 Tablet(s) Oral daily  levoFLOXacin IVPB 750 milliGRAM(s) IV Intermittent every 24 hours  LORazepam     Tablet 1 milliGRAM(s) Oral four times a day  midodrine. 10 milliGRAM(s) Oral three times a day  pantoprazole    Tablet 40 milliGRAM(s) Oral before breakfast  polyethylene glycol 3350 17 Gram(s) Oral daily  povidone iodine 10% Solution 1 Application(s) Topical daily  senna 2 Tablet(s) Oral at bedtime  simethicone 80 milliGRAM(s) Chew every 6 hours    MEDICATIONS  (PRN):  acetaminophen     Tablet .. 650 milliGRAM(s) Oral every 6 hours PRN Temp greater or equal to 38C (100.4F), Mild Pain (1 - 3)  bisacodyl Suppository 10 milliGRAM(s) Rectal daily PRN Constipation  dextrose Oral Gel 15 Gram(s) Oral once PRN Blood Glucose LESS THAN 70 milliGRAM(s)/deciliter  LORazepam   Injectable 2 milliGRAM(s) IV Push every 4 hours PRN Agitation  ondansetron Injectable 4 milliGRAM(s) IV Push every 8 hours PRN Nausea and/or Vomiting         Vitals log        ICU Vital Signs Last 24 Hrs  T(C): 36.9 (08 Nov 2022 03:28), Max: 36.9 (07 Nov 2022 20:00)  T(F): 98.4 (08 Nov 2022 03:28), Max: 98.4 (07 Nov 2022 20:00)  HR: 58 (08 Nov 2022 06:00) (58 - 107)  BP: 140/74 (08 Nov 2022 06:00) (101/61 - 140/74)  BP(mean): 94 (08 Nov 2022 06:00) (72 - 94)  ABP: --  ABP(mean): --  RR: 19 (08 Nov 2022 06:00) (16 - 42)  SpO2: 99% (08 Nov 2022 06:00) (94% - 100%)    O2 Parameters below as of 08 Nov 2022 06:00  Patient On (Oxygen Delivery Method): ventilator    O2 Concentration (%): 40         Mode: AC/ CMV (Assist Control/ Continuous Mandatory Ventilation)  RR (machine): 16  TV (machine): 400  FiO2: 40  PEEP: 5  ITime: 1  MAP: 12  PIP: 30      Input and Output:  I&O's Detail    06 Nov 2022 07:01  -  07 Nov 2022 07:00  --------------------------------------------------------  IN:    Enteral Tube Flush: 250 mL    Pulmocare: 305 mL  Total IN: 555 mL    OUT:    Voided (mL): 200 mL  Total OUT: 200 mL    Total NET: 355 mL      07 Nov 2022 07:01  -  08 Nov 2022 06:25  --------------------------------------------------------  IN:    Enteral Tube Flush: 1000 mL    IV PiggyBack: 100 mL    Pulmocare: 735 mL  Total IN: 1835 mL    OUT:    Voided (mL): 1400 mL  Total OUT: 1400 mL    Total NET: 435 mL          Lab Data                        8.8    6.86  )-----------( 148      ( 07 Nov 2022 05:41 )             29.4     11-07    135  |  102  |  16  ----------------------------<  123<H>  3.9   |  26  |  1.12    Ca    8.0<L>      07 Nov 2022 05:41    TPro  6.6  /  Alb  1.5<L>  /  TBili  0.7  /  DBili  x   /  AST  15  /  ALT  <10<L>  /  AlkPhos  108  11-07            Review of Systems	      Objective     Physical Examination    heart s1s2  lung dec BS  head nc      Pertinent Lab findings & Imaging      Annalise:  NO   Adequate UO     I&O's Detail    06 Nov 2022 07:01  -  07 Nov 2022 07:00  --------------------------------------------------------  IN:    Enteral Tube Flush: 250 mL    Pulmocare: 305 mL  Total IN: 555 mL    OUT:    Voided (mL): 200 mL  Total OUT: 200 mL    Total NET: 355 mL      07 Nov 2022 07:01  -  08 Nov 2022 06:25  --------------------------------------------------------  IN:    Enteral Tube Flush: 1000 mL    IV PiggyBack: 100 mL    Pulmocare: 735 mL  Total IN: 1835 mL    OUT:    Voided (mL): 1400 mL  Total OUT: 1400 mL    Total NET: 435 mL               Discussed with:     Cultures:	        Radiology

## 2022-11-08 NOTE — PROGRESS NOTE ADULT - ASSESSMENT
76 year old female with PMHx of dementia, COPD with chronic respiratory failure s/p trach, cardiac arrest, CHH, quadriplegia, CVA, CKD, anemia, PEG tube, and multiple hospital admissions for respiratory distress presents to the ED BIBEMS from [facility] for fever, SOB, and dark sputum output from trach.     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 Please call with any questions.   Andressa Brantley M.D.  [facility] Division of Infectious Diseases [phone]

## 2022-11-08 NOTE — PROGRESS NOTE ADULT - ASSESSMENT
76 year old female with PMHx of dementia, COPD with chronic respiratory failure s/p trach, cardiac arrest, CHH, quadriplegia, CVA, CKD, anemia, PEG tube, and multiple hospital admissions for respiratory distress a/w sepsis and acute on chronic hypoxic respiratory failure due to suspected recurrent VAP.    Severe sepsis and acute hypoxic respiratory failure 2/2 gram-negative PNA   SPCU monitoring  - ID (Karon/Brendon mccoy), recs appreciated   Now on levaquin and vancomycin per ID recs  - c/w midodrine with hold parameters -- consider titrating dose if BP elevated  - cc/pulm consult (Jaime), recs appreciated  - palliative care (Emre), recs appreciated - pt is full code  f/u cultures  titrate Fio2    Gangrenous toes  podiatry consulted  xrays pending    Constipation  dulcolax suppository ordered    Diastolic CHF  Hx of Pleural effusions  - last TTE was 5/30 with EF 65% with normal LVSF, dilated RV, and moderate pHTN -> repeat TTE appears unchanged  - may need repeat thoracentesis (had 1.5L drained few admissions ago), pulmonology consulted; pl effusion was parapneumonic on past admission as opposed to transudative and thus less likely related to CHF    Anemia of chronic disease  - has been evaluated by GI and hematology in the past -> dx with anemia of chronic disease with intermittent GI bleeding  - no jacqueline GI bleed per nursing     hx of HTN  - not on any anti-hypertensive meds at facility  - monitor VS    DM2  - NPO with TF  - c/w moderate dose lispro coverage sliding scale q6h  - goal -180 - now at goal - c/w lantus 5un sq QAM

## 2022-11-08 NOTE — PROGRESS NOTE ADULT - SUBJECTIVE AND OBJECTIVE BOX
Patient is a 76y old  Female who presents with a chief complaint of diabetic ulcerations hallux bilaterally rule out OM (08 Nov 2022 08:43)      INTERVAL HPI/OVERNIGHT EVENTS:    consulted IR team to place PICC    pt seen by podiatry and ordered xrays to rule out osteomyelitis    pt's  requesting a specialist to see if they can treat the gangrenous toes    MEDICATIONS  (STANDING):  cadexomer iodine 0.9% Gel 1 Application(s) Topical <User Schedule>  chlorhexidine 0.12% Liquid 15 milliLiter(s) Oral Mucosa every 12 hours  chlorhexidine 2% Cloths 1 Application(s) Topical daily  dextrose 5%. 1000 milliLiter(s) (100 mL/Hr) IV Continuous <Continuous>  dextrose 5%. 1000 milliLiter(s) (50 mL/Hr) IV Continuous <Continuous>  dextrose 50% Injectable 25 Gram(s) IV Push once  dextrose 50% Injectable 12.5 Gram(s) IV Push once  dextrose 50% Injectable 25 Gram(s) IV Push once  doxycycline IVPB      doxycycline IVPB 100 milliGRAM(s) IV Intermittent every 12 hours  glucagon  Injectable 1 milliGRAM(s) IntraMuscular once  heparin   Injectable 5000 Unit(s) SubCutaneous every 12 hours  insulin lispro (ADMELOG) corrective regimen sliding scale   SubCutaneous every 6 hours  lactobacillus acidophilus 1 Tablet(s) Oral daily  LORazepam     Tablet 1 milliGRAM(s) Oral four times a day  midodrine. 10 milliGRAM(s) Oral three times a day  pantoprazole    Tablet 40 milliGRAM(s) Oral before breakfast  polyethylene glycol 3350 17 Gram(s) Oral daily  povidone iodine 10% Solution 1 Application(s) Topical daily  senna 2 Tablet(s) Oral at bedtime  simethicone 80 milliGRAM(s) Chew every 6 hours  trimethoprim / sulfamethoxazole IVPB 250 milliGRAM(s) IV Intermittent every 8 hours    MEDICATIONS  (PRN):  acetaminophen     Tablet .. 650 milliGRAM(s) Oral every 6 hours PRN Temp greater or equal to 38C (100.4F), Mild Pain (1 - 3)  bisacodyl Suppository 10 milliGRAM(s) Rectal daily PRN Constipation  dextrose Oral Gel 15 Gram(s) Oral once PRN Blood Glucose LESS THAN 70 milliGRAM(s)/deciliter  LORazepam   Injectable 2 milliGRAM(s) IV Push every 4 hours PRN Agitation  ondansetron Injectable 4 milliGRAM(s) IV Push every 8 hours PRN Nausea and/or Vomiting      Allergies    codeine (Hives)    Intolerances        REVIEW OF SYSTEMS:  as above    Vital Signs Last 24 Hrs  T(C): 36.8 (08 Nov 2022 12:58), Max: 36.9 (07 Nov 2022 20:00)  T(F): 98.3 (08 Nov 2022 12:58), Max: 98.4 (07 Nov 2022 20:00)  HR: 60 (08 Nov 2022 15:00) (57 - 107)  BP: 105/74 (08 Nov 2022 14:00) (101/61 - 140/74)  BP(mean): 84 (08 Nov 2022 14:00) (72 - 107)  RR: 17 (08 Nov 2022 15:00) (16 - 42)  SpO2: 100% (08 Nov 2022 14:54) (94% - 100%)    Parameters below as of 08 Nov 2022 07:00  Patient On (Oxygen Delivery Method): ventilator        PHYSICAL EXAM:  GENERAL: NAD, Non Verbal  HEAD:  Atraumatic, Normocephalic  ENMT: Trach to Vent   NECK: Supple, No JVD, Normal thyroid  CHEST/LUNG: Clear to auscultation bilaterally; No rales, rhonchi, wheezing, or rubs  HEART: Regular rate and rhythm; No murmurs, rubs, or gallops  ABDOMEN: Soft, Nontender, PEG Tube +   EXTREMITIES:  2+ Peripheral Pulses, No clubbing, cyanosis, or edema    LABS:                        8.9    6.08  )-----------( 159      ( 08 Nov 2022 06:53 )             29.9     08 Nov 2022 06:53    139    |  104    |  15     ----------------------------<  109    4.2     |  27     |  1.03     Ca    7.8        08 Nov 2022 06:53  Phos  3.7       08 Nov 2022 06:53  Mg     1.3       08 Nov 2022 06:53    TPro  6.3    /  Alb  1.7    /  TBili  0.5    /  DBili  x      /  AST  20     /  ALT  <10    /  AlkPhos  108    08 Nov 2022 06:53        CAPILLARY BLOOD GLUCOSE      POCT Blood Glucose.: 136 mg/dL (08 Nov 2022 11:19)  POCT Blood Glucose.: 112 mg/dL (08 Nov 2022 05:48)  POCT Blood Glucose.: 158 mg/dL (07 Nov 2022 23:27)  POCT Blood Glucose.: 156 mg/dL (07 Nov 2022 17:24)      RADIOLOGY & ADDITIONAL TESTS:

## 2022-11-09 LAB
-  AMIKACIN: SIGNIFICANT CHANGE UP
-  AMOXICILLIN/CLAVULANIC ACID: SIGNIFICANT CHANGE UP
-  AMPICILLIN/SULBACTAM: SIGNIFICANT CHANGE UP
-  AMPICILLIN: SIGNIFICANT CHANGE UP
-  AZTREONAM: SIGNIFICANT CHANGE UP
-  CEFAZOLIN: SIGNIFICANT CHANGE UP
-  CEFEPIME: SIGNIFICANT CHANGE UP
-  CEFOXITIN: SIGNIFICANT CHANGE UP
-  CEFTRIAXONE: SIGNIFICANT CHANGE UP
-  CIPROFLOXACIN: SIGNIFICANT CHANGE UP
-  ERTAPENEM: SIGNIFICANT CHANGE UP
-  GENTAMICIN: SIGNIFICANT CHANGE UP
-  LEVOFLOXACIN: SIGNIFICANT CHANGE UP
-  MEROPENEM: SIGNIFICANT CHANGE UP
-  PIPERACILLIN/TAZOBACTAM: SIGNIFICANT CHANGE UP
-  TOBRAMYCIN: SIGNIFICANT CHANGE UP
-  TRIMETHOPRIM/SULFAMETHOXAZOLE: SIGNIFICANT CHANGE UP
ALBUMIN SERPL ELPH-MCNC: 1.8 G/DL — LOW (ref 3.3–5)
ALP SERPL-CCNC: 104 U/L — SIGNIFICANT CHANGE UP (ref 30–120)
ALT FLD-CCNC: <10 U/L DA — LOW (ref 10–60)
ANION GAP SERPL CALC-SCNC: 7 MMOL/L — SIGNIFICANT CHANGE UP (ref 5–17)
AST SERPL-CCNC: 14 U/L — SIGNIFICANT CHANGE UP (ref 10–40)
BILIRUB SERPL-MCNC: 0.3 MG/DL — SIGNIFICANT CHANGE UP (ref 0.2–1.2)
BUN SERPL-MCNC: 11 MG/DL — SIGNIFICANT CHANGE UP (ref 7–23)
CALCIUM SERPL-MCNC: 8 MG/DL — LOW (ref 8.4–10.5)
CHLORIDE SERPL-SCNC: 103 MMOL/L — SIGNIFICANT CHANGE UP (ref 96–108)
CO2 SERPL-SCNC: 28 MMOL/L — SIGNIFICANT CHANGE UP (ref 22–31)
CREAT SERPL-MCNC: 0.99 MG/DL — SIGNIFICANT CHANGE UP (ref 0.5–1.3)
CULTURE RESULTS: SIGNIFICANT CHANGE UP
EGFR: 58 ML/MIN/1.73M2 — LOW
GLUCOSE BLDC GLUCOMTR-MCNC: 127 MG/DL — HIGH (ref 70–99)
GLUCOSE BLDC GLUCOMTR-MCNC: 136 MG/DL — HIGH (ref 70–99)
GLUCOSE BLDC GLUCOMTR-MCNC: 137 MG/DL — HIGH (ref 70–99)
GLUCOSE BLDC GLUCOMTR-MCNC: 145 MG/DL — HIGH (ref 70–99)
GLUCOSE SERPL-MCNC: 156 MG/DL — HIGH (ref 70–99)
HCT VFR BLD CALC: 29.3 % — LOW (ref 34.5–45)
HGB BLD-MCNC: 8.6 G/DL — LOW (ref 11.5–15.5)
MAGNESIUM SERPL-MCNC: 2.3 MG/DL — SIGNIFICANT CHANGE UP (ref 1.6–2.6)
MCHC RBC-ENTMCNC: 27.2 PG — SIGNIFICANT CHANGE UP (ref 27–34)
MCHC RBC-ENTMCNC: 29.4 GM/DL — LOW (ref 32–36)
MCV RBC AUTO: 92.7 FL — SIGNIFICANT CHANGE UP (ref 80–100)
METHOD TYPE: SIGNIFICANT CHANGE UP
NRBC # BLD: 0 /100 WBCS — SIGNIFICANT CHANGE UP (ref 0–0)
ORGANISM # SPEC MICROSCOPIC CNT: SIGNIFICANT CHANGE UP
PHOSPHATE SERPL-MCNC: 4.4 MG/DL — SIGNIFICANT CHANGE UP (ref 2.5–4.5)
PLATELET # BLD AUTO: 180 K/UL — SIGNIFICANT CHANGE UP (ref 150–400)
POTASSIUM SERPL-MCNC: 4.3 MMOL/L — SIGNIFICANT CHANGE UP (ref 3.5–5.3)
POTASSIUM SERPL-SCNC: 4.3 MMOL/L — SIGNIFICANT CHANGE UP (ref 3.5–5.3)
PROT SERPL-MCNC: 7.1 G/DL — SIGNIFICANT CHANGE UP (ref 6–8.3)
RBC # BLD: 3.16 M/UL — LOW (ref 3.8–5.2)
RBC # FLD: 16.5 % — HIGH (ref 10.3–14.5)
SODIUM SERPL-SCNC: 138 MMOL/L — SIGNIFICANT CHANGE UP (ref 135–145)
SPECIMEN SOURCE: SIGNIFICANT CHANGE UP
WBC # BLD: 4.61 K/UL — SIGNIFICANT CHANGE UP (ref 3.8–10.5)
WBC # FLD AUTO: 4.61 K/UL — SIGNIFICANT CHANGE UP (ref 3.8–10.5)

## 2022-11-09 PROCEDURE — 36410 VNPNXR 3YR/> PHY/QHP DX/THER: CPT

## 2022-11-09 PROCEDURE — 36573 INSJ PICC RS&I 5 YR+: CPT | Mod: 53

## 2022-11-09 PROCEDURE — 76937 US GUIDE VASCULAR ACCESS: CPT | Mod: 26

## 2022-11-09 PROCEDURE — 99233 SBSQ HOSP IP/OBS HIGH 50: CPT

## 2022-11-09 RX ORDER — SODIUM CHLORIDE 9 MG/ML
1000 INJECTION, SOLUTION INTRAVENOUS
Refills: 0 | Status: DISCONTINUED | OUTPATIENT
Start: 2022-11-09 | End: 2022-11-10

## 2022-11-09 RX ORDER — SODIUM CHLORIDE 9 MG/ML
1000 INJECTION INTRAMUSCULAR; INTRAVENOUS; SUBCUTANEOUS
Refills: 0 | Status: DISCONTINUED | OUTPATIENT
Start: 2022-11-09 | End: 2022-11-10

## 2022-11-09 RX ORDER — CHLORHEXIDINE GLUCONATE 213 G/1000ML
1 SOLUTION TOPICAL DAILY
Refills: 0 | Status: DISCONTINUED | OUTPATIENT
Start: 2022-11-09 | End: 2022-11-10

## 2022-11-09 RX ADMIN — Medication 100 MILLIGRAM(S): at 18:13

## 2022-11-09 RX ADMIN — PANTOPRAZOLE SODIUM 40 MILLIGRAM(S): 20 TABLET, DELAYED RELEASE ORAL at 06:44

## 2022-11-09 RX ADMIN — Medication 2 MILLIGRAM(S): at 23:40

## 2022-11-09 RX ADMIN — SIMETHICONE 80 MILLIGRAM(S): 80 TABLET, CHEWABLE ORAL at 11:16

## 2022-11-09 RX ADMIN — MIDODRINE HYDROCHLORIDE 10 MILLIGRAM(S): 2.5 TABLET ORAL at 11:16

## 2022-11-09 RX ADMIN — MIDODRINE HYDROCHLORIDE 10 MILLIGRAM(S): 2.5 TABLET ORAL at 05:29

## 2022-11-09 RX ADMIN — Medication 265.62 MILLIGRAM(S): at 15:07

## 2022-11-09 RX ADMIN — Medication 1 TABLET(S): at 11:16

## 2022-11-09 RX ADMIN — SIMETHICONE 80 MILLIGRAM(S): 80 TABLET, CHEWABLE ORAL at 05:29

## 2022-11-09 RX ADMIN — CHLORHEXIDINE GLUCONATE 1 APPLICATION(S): 213 SOLUTION TOPICAL at 11:17

## 2022-11-09 RX ADMIN — Medication 100 MILLIGRAM(S): at 05:31

## 2022-11-09 RX ADMIN — Medication 2 MILLIGRAM(S): at 18:13

## 2022-11-09 RX ADMIN — Medication 265.62 MILLIGRAM(S): at 06:30

## 2022-11-09 RX ADMIN — Medication 1 MILLIGRAM(S): at 05:29

## 2022-11-09 RX ADMIN — POLYETHYLENE GLYCOL 3350 17 GRAM(S): 17 POWDER, FOR SOLUTION ORAL at 11:17

## 2022-11-09 RX ADMIN — Medication 2 MILLIGRAM(S): at 00:55

## 2022-11-09 RX ADMIN — CHLORHEXIDINE GLUCONATE 15 MILLILITER(S): 213 SOLUTION TOPICAL at 17:45

## 2022-11-09 RX ADMIN — Medication 265.62 MILLIGRAM(S): at 21:36

## 2022-11-09 RX ADMIN — Medication 1 MILLIGRAM(S): at 11:16

## 2022-11-09 RX ADMIN — CHLORHEXIDINE GLUCONATE 15 MILLILITER(S): 213 SOLUTION TOPICAL at 05:29

## 2022-11-09 RX ADMIN — CHLORHEXIDINE GLUCONATE 1 APPLICATION(S): 213 SOLUTION TOPICAL at 06:31

## 2022-11-09 RX ADMIN — SODIUM CHLORIDE 75 MILLILITER(S): 9 INJECTION, SOLUTION INTRAVENOUS at 21:36

## 2022-11-09 RX ADMIN — HEPARIN SODIUM 5000 UNIT(S): 5000 INJECTION INTRAVENOUS; SUBCUTANEOUS at 05:29

## 2022-11-09 RX ADMIN — HEPARIN SODIUM 5000 UNIT(S): 5000 INJECTION INTRAVENOUS; SUBCUTANEOUS at 17:45

## 2022-11-09 NOTE — PROVIDER CONTACT NOTE (OTHER) - ACTION/TREATMENT ORDERED:
Woody inflated into tract and balloon inflated. Residual feed can be seen accumulating in tube.  GI consult pending.

## 2022-11-09 NOTE — PROVIDER CONTACT NOTE (CRITICAL VALUE NOTIFICATION) - SITUATION
Urine culture from 11/4/22 show   >100,000 Enterococcus faecium (Vancomycin resistant)   50,000-99,000 candida  Albicans (Susceptibilities not performed)

## 2022-11-09 NOTE — PROGRESS NOTE ADULT - SUBJECTIVE AND OBJECTIVE BOX
ICU Progress Note    HPI:    S:    Pt seen and examined  HD #  Pt here for      Allergies    codeine (Hives)    Intolerances        MEDICATIONS  (STANDING):  cadexomer iodine 0.9% Gel 1 Application(s) Topical <User Schedule>  chlorhexidine 0.12% Liquid 15 milliLiter(s) Oral Mucosa every 12 hours  chlorhexidine 2% Cloths 1 Application(s) Topical daily  chlorhexidine 2% Cloths 1 Application(s) Topical daily  dextrose 5% + sodium chloride 0.45%. 1000 milliLiter(s) (75 mL/Hr) IV Continuous <Continuous>  dextrose 5%. 1000 milliLiter(s) (100 mL/Hr) IV Continuous <Continuous>  dextrose 5%. 1000 milliLiter(s) (50 mL/Hr) IV Continuous <Continuous>  dextrose 50% Injectable 25 Gram(s) IV Push once  dextrose 50% Injectable 12.5 Gram(s) IV Push once  dextrose 50% Injectable 25 Gram(s) IV Push once  doxycycline IVPB 100 milliGRAM(s) IV Intermittent every 12 hours  doxycycline IVPB      glucagon  Injectable 1 milliGRAM(s) IntraMuscular once  heparin   Injectable 5000 Unit(s) SubCutaneous every 12 hours  insulin lispro (ADMELOG) corrective regimen sliding scale   SubCutaneous every 6 hours  lactobacillus acidophilus 1 Tablet(s) Oral daily  LORazepam     Tablet 1 milliGRAM(s) Oral four times a day  midodrine. 10 milliGRAM(s) Oral three times a day  pantoprazole    Tablet 40 milliGRAM(s) Oral before breakfast  polyethylene glycol 3350 17 Gram(s) Oral daily  povidone iodine 10% Solution 1 Application(s) Topical daily  senna 2 Tablet(s) Oral at bedtime  simethicone 80 milliGRAM(s) Chew every 6 hours  sodium chloride 0.9%. 1000 milliLiter(s) (50 mL/Hr) IV Continuous <Continuous>  trimethoprim / sulfamethoxazole IVPB 250 milliGRAM(s) IV Intermittent every 8 hours    MEDICATIONS  (PRN):  acetaminophen     Tablet .. 650 milliGRAM(s) Oral every 6 hours PRN Temp greater or equal to 38C (100.4F), Mild Pain (1 - 3)  bisacodyl Suppository 10 milliGRAM(s) Rectal daily PRN Constipation  dextrose Oral Gel 15 Gram(s) Oral once PRN Blood Glucose LESS THAN 70 milliGRAM(s)/deciliter  LORazepam   Injectable 2 milliGRAM(s) IV Push every 4 hours PRN Agitation  ondansetron Injectable 4 milliGRAM(s) IV Push every 8 hours PRN Nausea and/or Vomiting  sodium chloride 0.9% lock flush 10 milliLiter(s) IV Push every 1 hour PRN Pre/post blood products, medications, blood draw, and to maintain line patency      Drug Dosing Weight  Height (cm): 165.1 (04 Nov 2022 12:44)  Weight (kg): 49.1 (18 Oct 2022 15:00)  BMI (kg/m2): 18 (04 Nov 2022 12:44)  BSA (m2): 1.52 (04 Nov 2022 12:44)    PAST MEDICAL & SURGICAL HISTORY:  Dementia of frontal lobe type      Aphasic stroke      Diabetes mellitus      Respiratory failure      Hypertension      GERD (gastroesophageal reflux disease)      Constipation      Respiratory failure      CVA (cerebral vascular accident)      HTN (hypertension)      DM (diabetes mellitus)      Advanced dementia      COVID-19 virus detected      Quadriplegia      Pneumonia      Type II diabetes mellitus      Hx of appendectomy      Gastrostomy in place      Tracheostomy in place      Tracheostomy tube present      Feeding by G-tube          FAMILY HISTORY:  No pertinent family history in first degree relatives        ROS: See HPI; otherwise, all systems reviewed and negative.    O:    ICU Vital Signs Last 24 Hrs  T(C): 36.1 (09 Nov 2022 15:25), Max: 37.3 (09 Nov 2022 05:00)  T(F): 97 (09 Nov 2022 15:25), Max: 99.2 (09 Nov 2022 05:00)  HR: 53 (09 Nov 2022 19:32) (53 - 98)  BP: 100/89 (09 Nov 2022 19:00) (100/53 - 153/66)  BP(mean): 95 (09 Nov 2022 19:00) (66 - 95)  ABP: --  ABP(mean): --  RR: 14 (09 Nov 2022 19:00) (9 - 32)  SpO2: 100% (09 Nov 2022 19:32) (97% - 100%)    O2 Parameters below as of 09 Nov 2022 19:00  Patient On (Oxygen Delivery Method): ventilator    O2 Concentration (%): 50            I&O's Detail    08 Nov 2022 07:01  -  09 Nov 2022 07:00  --------------------------------------------------------  IN:    Enteral Tube Flush: 350 mL    IV PiggyBack: 150 mL    IV PiggyBack: 50 mL    IV PiggyBack: 50 mL    IV PiggyBack: 100 mL    Pulmocare: 805 mL  Total IN: 1505 mL    OUT:    Voided (mL): 950 mL  Total OUT: 950 mL    Total NET: 555 mL      09 Nov 2022 07:01  -  09 Nov 2022 22:37  --------------------------------------------------------  IN:    Enteral Tube Flush: 100 mL    IV PiggyBack: 100 mL    IV PiggyBack: 250 mL    Pulmocare: 350 mL  Total IN: 800 mL    OUT:    Voided (mL): 600 mL  Total OUT: 600 mL    Total NET: 200 mL          Mode: AC/ CMV (Assist Control/ Continuous Mandatory Ventilation)  RR (machine): 16  TV (machine): 400  FiO2: 50  PEEP: 5  ITime: 1  MAP: 12  PIP: 29      PE:    Constitutional: Healthy M lying in bed in NAD.   Neck: No JVD, trachea midline.   Respiratory: CTA B/L good BS B/L no W/R/R.  Cardiovascular: S1S2+ RRR no M/R/G.  Gastrointestinal: Soft, NTND.  Extremities: No peripheral edema, No cyanosis, clubbing.  Neurological: Awake, conversive, alert, no gross deficits.  Skin: No rashes, warm, moist.    LABS:    CBC Full  -  ( 09 Nov 2022 05:55 )  WBC Count : 4.61 K/uL  RBC Count : 3.16 M/uL  Hemoglobin : 8.6 g/dL  Hematocrit : 29.3 %  Platelet Count - Automated : 180 K/uL  Mean Cell Volume : 92.7 fl  Mean Cell Hemoglobin : 27.2 pg  Mean Cell Hemoglobin Concentration : 29.4 gm/dL  Auto Neutrophil # : x  Auto Lymphocyte # : x  Auto Monocyte # : x  Auto Eosinophil # : x  Auto Basophil # : x  Auto Neutrophil % : x  Auto Lymphocyte % : x  Auto Monocyte % : x  Auto Eosinophil % : x  Auto Basophil % : x    11-09    138  |  103  |  11  ----------------------------<  156<H>  4.3   |  28  |  0.99    Ca    8.0<L>      09 Nov 2022 05:55  Phos  4.4     11-09  Mg     2.3     11-09    TPro  7.1  /  Alb  1.8<L>  /  TBili  0.3  /  DBili  x   /  AST  14  /  ALT  <10<L>  /  AlkPhos  104  11-09        CAPILLARY BLOOD GLUCOSE      POCT Blood Glucose.: 145 mg/dL (09 Nov 2022 17:47)  POCT Blood Glucose.: 137 mg/dL (09 Nov 2022 11:14)  POCT Blood Glucose.: 127 mg/dL (09 Nov 2022 05:29)  POCT Blood Glucose.: 196 mg/dL (08 Nov 2022 23:23)        LIVER FUNCTIONS - ( 09 Nov 2022 05:55 )  Alb: 1.8 g/dL / Pro: 7.1 g/dL / ALK PHOS: 104 U/L / ALT: <10 U/L DA / AST: 14 U/L / GGT: x               A:    76yFemale  HD #    Here for:    1.    P:    Neuro: GCS 15. Monitor for delirium.  Continue to optimize pain control. Serial Neurologic assessments.    HEENT: No issues.    CV: Continue hemodynamic monitoring    Pulm: Pulmonary toilet.  Continue incentive spirometer.  Chest PT.  Encourage OOB to chair and ambulation. Nebs. f/u ABG, CXR.    GI/Nutrition: Cont diet, bowel regimen.    /Renal: Monitor UOP. Monitor BMP.  Replete Lytes as needed.    HEME- Chemical and mechanical DVT ppx. f/u CBC. f/u coags as needed.    ID:  Cont abx. f/u Cx's.    Lines/Tubes:     Endo: Maintain euglycemia.    Skin:  Cont skin care, pressure ulcer prevention.    Dispo: Cont care.       ICU Progress Note    S: Chronic vent. Midline placed. No other new events.    Pt seen and examined  HD #  Pt here for      Allergies    codeine (Hives)    Intolerances        MEDICATIONS  (STANDING):  cadexomer iodine 0.9% Gel 1 Application(s) Topical <User Schedule>  chlorhexidine 0.12% Liquid 15 milliLiter(s) Oral Mucosa every 12 hours  chlorhexidine 2% Cloths 1 Application(s) Topical daily  chlorhexidine 2% Cloths 1 Application(s) Topical daily  dextrose 5% + sodium chloride 0.45%. 1000 milliLiter(s) (75 mL/Hr) IV Continuous <Continuous>  dextrose 5%. 1000 milliLiter(s) (100 mL/Hr) IV Continuous <Continuous>  dextrose 5%. 1000 milliLiter(s) (50 mL/Hr) IV Continuous <Continuous>  dextrose 50% Injectable 25 Gram(s) IV Push once  dextrose 50% Injectable 12.5 Gram(s) IV Push once  dextrose 50% Injectable 25 Gram(s) IV Push once  doxycycline IVPB 100 milliGRAM(s) IV Intermittent every 12 hours  doxycycline IVPB      glucagon  Injectable 1 milliGRAM(s) IntraMuscular once  heparin   Injectable 5000 Unit(s) SubCutaneous every 12 hours  insulin lispro (ADMELOG) corrective regimen sliding scale   SubCutaneous every 6 hours  lactobacillus acidophilus 1 Tablet(s) Oral daily  LORazepam     Tablet 1 milliGRAM(s) Oral four times a day  midodrine. 10 milliGRAM(s) Oral three times a day  pantoprazole    Tablet 40 milliGRAM(s) Oral before breakfast  polyethylene glycol 3350 17 Gram(s) Oral daily  povidone iodine 10% Solution 1 Application(s) Topical daily  senna 2 Tablet(s) Oral at bedtime  simethicone 80 milliGRAM(s) Chew every 6 hours  sodium chloride 0.9%. 1000 milliLiter(s) (50 mL/Hr) IV Continuous <Continuous>  trimethoprim / sulfamethoxazole IVPB 250 milliGRAM(s) IV Intermittent every 8 hours    MEDICATIONS  (PRN):  acetaminophen     Tablet .. 650 milliGRAM(s) Oral every 6 hours PRN Temp greater or equal to 38C (100.4F), Mild Pain (1 - 3)  bisacodyl Suppository 10 milliGRAM(s) Rectal daily PRN Constipation  dextrose Oral Gel 15 Gram(s) Oral once PRN Blood Glucose LESS THAN 70 milliGRAM(s)/deciliter  LORazepam   Injectable 2 milliGRAM(s) IV Push every 4 hours PRN Agitation  ondansetron Injectable 4 milliGRAM(s) IV Push every 8 hours PRN Nausea and/or Vomiting  sodium chloride 0.9% lock flush 10 milliLiter(s) IV Push every 1 hour PRN Pre/post blood products, medications, blood draw, and to maintain line patency      Drug Dosing Weight  Height (cm): 165.1 (04 Nov 2022 12:44)  Weight (kg): 49.1 (18 Oct 2022 15:00)  BMI (kg/m2): 18 (04 Nov 2022 12:44)  BSA (m2): 1.52 (04 Nov 2022 12:44)    PAST MEDICAL & SURGICAL HISTORY:  Dementia of frontal lobe type      Aphasic stroke      Diabetes mellitus      Respiratory failure      Hypertension      GERD (gastroesophageal reflux disease)      Constipation      Respiratory failure      CVA (cerebral vascular accident)      HTN (hypertension)      DM (diabetes mellitus)      Advanced dementia      COVID-19 virus detected      Quadriplegia      Pneumonia      Type II diabetes mellitus      Hx of appendectomy      Gastrostomy in place      Tracheostomy in place      Tracheostomy tube present      Feeding by G-tube          FAMILY HISTORY:  No pertinent family history in first degree relatives        ROS: See HPI; otherwise, all systems reviewed and negative.    O:    ICU Vital Signs Last 24 Hrs  T(C): 36.1 (09 Nov 2022 15:25), Max: 37.3 (09 Nov 2022 05:00)  T(F): 97 (09 Nov 2022 15:25), Max: 99.2 (09 Nov 2022 05:00)  HR: 53 (09 Nov 2022 19:32) (53 - 98)  BP: 100/89 (09 Nov 2022 19:00) (100/53 - 153/66)  BP(mean): 95 (09 Nov 2022 19:00) (66 - 95)  ABP: --  ABP(mean): --  RR: 14 (09 Nov 2022 19:00) (9 - 32)  SpO2: 100% (09 Nov 2022 19:32) (97% - 100%)    O2 Parameters below as of 09 Nov 2022 19:00  Patient On (Oxygen Delivery Method): ventilator    O2 Concentration (%): 50            I&O's Detail    08 Nov 2022 07:01  -  09 Nov 2022 07:00  --------------------------------------------------------  IN:    Enteral Tube Flush: 350 mL    IV PiggyBack: 150 mL    IV PiggyBack: 50 mL    IV PiggyBack: 50 mL    IV PiggyBack: 100 mL    Pulmocare: 805 mL  Total IN: 1505 mL    OUT:    Voided (mL): 950 mL  Total OUT: 950 mL    Total NET: 555 mL      09 Nov 2022 07:01  -  09 Nov 2022 22:37  --------------------------------------------------------  IN:    Enteral Tube Flush: 100 mL    IV PiggyBack: 100 mL    IV PiggyBack: 250 mL    Pulmocare: 350 mL  Total IN: 800 mL    OUT:    Voided (mL): 600 mL  Total OUT: 600 mL    Total NET: 200 mL          Mode: AC/ CMV (Assist Control/ Continuous Mandatory Ventilation)  RR (machine): 16  TV (machine): 400  FiO2: 50  PEEP: 5  ITime: 1  MAP: 12  PIP: 29      PE:    Constitutional: NAD.   Neck: No JVD, trachea midline. +tracheostomy  Respiratory: CTA B/L good BS B/L no W/R/R.  Cardiovascular: S1S2+ RRR no M/R/G.  Gastrointestinal: Soft, NTND.  Extremities: compartments soft  Neurological: awake, contracted.  Skin: No rashes, warm, moist.    LABS:    CBC Full  -  ( 09 Nov 2022 05:55 )  WBC Count : 4.61 K/uL  RBC Count : 3.16 M/uL  Hemoglobin : 8.6 g/dL  Hematocrit : 29.3 %  Platelet Count - Automated : 180 K/uL  Mean Cell Volume : 92.7 fl  Mean Cell Hemoglobin : 27.2 pg  Mean Cell Hemoglobin Concentration : 29.4 gm/dL  Auto Neutrophil # : x  Auto Lymphocyte # : x  Auto Monocyte # : x  Auto Eosinophil # : x  Auto Basophil # : x  Auto Neutrophil % : x  Auto Lymphocyte % : x  Auto Monocyte % : x  Auto Eosinophil % : x  Auto Basophil % : x    11-09    138  |  103  |  11  ----------------------------<  156<H>  4.3   |  28  |  0.99    Ca    8.0<L>      09 Nov 2022 05:55  Phos  4.4     11-09  Mg     2.3     11-09    TPro  7.1  /  Alb  1.8<L>  /  TBili  0.3  /  DBili  x   /  AST  14  /  ALT  <10<L>  /  AlkPhos  104  11-09        CAPILLARY BLOOD GLUCOSE      POCT Blood Glucose.: 145 mg/dL (09 Nov 2022 17:47)  POCT Blood Glucose.: 137 mg/dL (09 Nov 2022 11:14)  POCT Blood Glucose.: 127 mg/dL (09 Nov 2022 05:29)  POCT Blood Glucose.: 196 mg/dL (08 Nov 2022 23:23)        LIVER FUNCTIONS - ( 09 Nov 2022 05:55 )  Alb: 1.8 g/dL / Pro: 7.1 g/dL / ALK PHOS: 104 U/L / ALT: <10 U/L DA / AST: 14 U/L / GGT: x               A:    76yFemale  HD #    Here for:    1. PNA  2. Chronic resp failure    P:      PRN Ativan  Cont midodrine  Chronic vent. Maintain O2 Sat >90%. Pulm toilet   Tube feeds to goal  PPI  DVT ppx  ABX per ID (bactrim and doxy)

## 2022-11-09 NOTE — PROVIDER CONTACT NOTE (OTHER) - RECOMMENDATIONS
Dr Buenrostro notified, who f/u with GI, Dr Moncada. Recommends RN insert Woody and inflate balloon into opening to keep tract open. Dr Buenrostro notified, who f/u with GI, Dr Moncada. Recommend insert Woody and inflate balloon into opening to keep tract open.

## 2022-11-09 NOTE — PROGRESS NOTE ADULT - SUBJECTIVE AND OBJECTIVE BOX
[facility], Division of Infectious Diseases  MOIZ Lora Y. Patel, S. Shah, G. [facility]  [phone]    Name: BREA BECKHAM  Age: 76y  Gender: Female  MRN: [id number]    Interval History:  Patient seen and examined at bedside  No acute overnight events. Afebrile  Midline placed this AM  Notes reviewed    Antibiotics:  doxycycline IVPB 100 milliGRAM(s) IV Intermittent every 12 hours  doxycycline IVPB      trimethoprim / sulfamethoxazole IVPB 250 milliGRAM(s) IV Intermittent every 8 hours      Medications:  acetaminophen     Tablet .. 650 milliGRAM(s) Oral every 6 hours PRN  bisacodyl Suppository 10 milliGRAM(s) Rectal daily PRN  cadexomer iodine 0.9% Gel 1 Application(s) Topical <User Schedule>  chlorhexidine 0.12% Liquid 15 milliLiter(s) Oral Mucosa every 12 hours  chlorhexidine 2% Cloths 1 Application(s) Topical daily  chlorhexidine 2% Cloths 1 Application(s) Topical daily  dextrose 5%. 1000 milliLiter(s) IV Continuous <Continuous>  dextrose 5%. 1000 milliLiter(s) IV Continuous <Continuous>  dextrose 50% Injectable 25 Gram(s) IV Push once  dextrose 50% Injectable 12.5 Gram(s) IV Push once  dextrose 50% Injectable 25 Gram(s) IV Push once  dextrose Oral Gel 15 Gram(s) Oral once PRN  doxycycline IVPB 100 milliGRAM(s) IV Intermittent every 12 hours  doxycycline IVPB      glucagon  Injectable 1 milliGRAM(s) IntraMuscular once  heparin   Injectable 5000 Unit(s) SubCutaneous every 12 hours  insulin lispro (ADMELOG) corrective regimen sliding scale   SubCutaneous every 6 hours  lactobacillus acidophilus 1 Tablet(s) Oral daily  LORazepam     Tablet 1 milliGRAM(s) Oral four times a day  LORazepam   Injectable 2 milliGRAM(s) IV Push every 4 hours PRN  midodrine. 10 milliGRAM(s) Oral three times a day  ondansetron Injectable 4 milliGRAM(s) IV Push every 8 hours PRN  pantoprazole    Tablet 40 milliGRAM(s) Oral before breakfast  polyethylene glycol 3350 17 Gram(s) Oral daily  povidone iodine 10% Solution 1 Application(s) Topical daily  senna 2 Tablet(s) Oral at bedtime  simethicone 80 milliGRAM(s) Chew every 6 hours  sodium chloride 0.9% lock flush 10 milliLiter(s) IV Push every 1 hour PRN  trimethoprim / sulfamethoxazole IVPB 250 milliGRAM(s) IV Intermittent every 8 hours      Review of Systems:  unable to obtain      Allergies: codeine (Hives)    For details regarding the patient's past medical history, social history, family history, and other miscellaneous elements, please refer the initial infectious diseases consultation and/or the admitting history and physical examination for this admission.    Objective:  Vitals:   T(C): 36.6 (11-09-22 @ 08:51), Max: 37.3 (11-09-22 @ 05:00)  HR: 62 (11-09-22 @ 12:00) (58 - 106)  BP: 117/61 (11-09-22 @ 12:00) (97/57 - 171/75)  RR: 20 (11-09-22 @ 12:00) (9 - 32)  SpO2: 100% (11-09-22 @ 12:00) (97% - 100%)    Physical Examination:  General: chronically ill appearing   Neck: trach  HEENT: NC/AT  Lungs: MV breath sounds  Heart: Regular rate and rhythm. No murmur, rub or gallop.  Abdomen: Soft. Nondistended. Nontender. PGT in place  Skin: Warm. Dry. Good turgor      Laboratory Studies:  CBC:                       8.6    4.61  )-----------( 180      ( 09 Nov 2022 05:55 )             29.3     CMP: 11-09    138  |  103  |  11  ----------------------------<  156<H>  4.3   |  28  |  0.99    Ca    8.0<L>      09 Nov 2022 05:55  Phos  4.4     11-09  Mg     2.3     11-09    TPro  7.1  /  Alb  1.8<L>  /  TBili  0.3  /  DBili  x   /  AST  14  /  ALT  <10<L>  /  AlkPhos  104  11-09    LIVER FUNCTIONS - ( 09 Nov 2022 05:55 )  Alb: 1.8 g/dL / Pro: 7.1 g/dL / ALK PHOS: 104 U/L / ALT: <10 U/L DA / AST: 14 U/L / GGT: x               Microbiology: reviewed    Culture - Sputum (collected 11-05-22 @ 06:39)  Source: Trach Asp Tracheal Aspirate  Gram Stain (11-05-22 @ 14:45):    Few Squamous epithelial cells per low power field    Few polymorphonuclear leukocytes per low power field    Rare Gram Positive Rods per oil power field    Rare Gram Negative Rods per oil power field  Preliminary Report (11-07-22 @ 10:10):    Numerous Stenotrophomonas maltophilia Susceptibility to follow.  Organism: Stenotrophomonas maltophilia (11-08-22 @ 12:00)      -  Minocycline: [id number]      Method Type: KB  Organism: Stenotrophomonas maltophilia (11-08-22 @ 12:00)  Organism: Stenotrophomonas maltophilia (11-08-22 @ 12:00)      -  Levofloxacin: I 4      -  Trimethoprim/Sulfamethoxazole: S <=0.5/9.5      Method Type: PRATIK    Culture - Urine (collected 11-04-22 @ 13:54)  Source: Clean Catch Clean Catch (Midstream)  Final Report (11-07-22 @ 10:15):    >100,000 CFU/ml Enterococcus faecium (vancomycin resistant)    50,000 - 99,000 CFU/mL Candida albicans "Susceptibilities not performed"  Organism: Enterococcus faecium (vancomycin resistant) (11-07-22 @ 10:15)  Organism: Enterococcus faecium (vancomycin resistant) (11-07-22 @ 10:15)      -  Ampicillin: R >8 Predicts results to ampicillin/sulbactam, amoxacillin-clavulanate and  piperacillin-tazobactam.      -  Ciprofloxacin: R >2      -  Daptomycin: SDD 4 The breakpoint for SDD (sensitive dose dependent)is based on a dosage regimen of 8-12 mg/kg administered every 24 h in adults and is intended for serious infections due to E. faecium. Consultation with an infectious diseases specialist is recommended.      -  Levofloxacin: R >4      -  Linezolid: S 2      -  Nitrofurantoin: R >64 Should not be used to treat pyelonephritis.      -  Tetra/Doxy: S <=4      -  Vancomycin: R >16      Method Type: PRATIK    Culture - Blood (collected 11-04-22 @ 13:52)  Source: .Blood Blood-Peripheral  Preliminary Report (11-05-22 @ 20:02):    No growth to date.    Culture - Blood (collected 11-04-22 @ 13:52)  Source: .Blood Blood-Peripheral  Preliminary Report (11-05-22 @ 20:02):    No growth to date.          Radiology: reviewed

## 2022-11-09 NOTE — PROGRESS NOTE ADULT - SUBJECTIVE AND OBJECTIVE BOX
SUBJECTIVE:    No acute events overnight, afebrile, hds.    REVIEW OF SYSTEMS:    unable to elicit, pt largely nonverbal, but does become alert and eyes are open    VITAL SIGNS:    Vital Signs Last 24 Hrs  T(C): 36.6 (09 Nov 2022 08:51), Max: 37.3 (09 Nov 2022 05:00)  T(F): 97.9 (09 Nov 2022 08:51), Max: 99.2 (09 Nov 2022 05:00)  HR: 62 (09 Nov 2022 12:00) (58 - 106)  BP: 117/61 (09 Nov 2022 12:00) (97/57 - 171/75)  BP(mean): 76 (09 Nov 2022 12:00) (67 - 101)  RR: 20 (09 Nov 2022 12:00) (9 - 32)  SpO2: 100% (09 Nov 2022 12:00) (97% - 100%)    Parameters below as of 09 Nov 2022 11:00  Patient On (Oxygen Delivery Method): ventilator    O2 Concentration (%): 50    PHYSICAL EXAM:     GENERAL: vented  HEENT:  EOMI, neck supple, MMM  RESPIRATORY: measured anteriorly, mildly coarse sounds  CARDIOVASCULAR: RRR, no murmurs, gallops, rubs  ABDOMINAL: soft, non-tender, non-distended, positive bowel sounds   EXTREMITIES: no clubbing, cyanosis, or edema  NEUROLOGICAL: eyes open, but nonverbal  SKIN: no rashes or lesions   MUSCULOSKELETAL: no gross joint deformity                          8.6    4.61  )-----------( 180      ( 09 Nov 2022 05:55 )             29.3     11-09    138  |  103  |  11  ----------------------------<  156<H>  4.3   |  28  |  0.99    Ca    8.0<L>      09 Nov 2022 05:55  Phos  4.4     11-09  Mg     2.3     11-09    TPro  7.1  /  Alb  1.8<L>  /  TBili  0.3  /  DBili  x   /  AST  14  /  ALT  <10<L>  /  AlkPhos  104  11-09      CAPILLARY BLOOD GLUCOSE      POCT Blood Glucose.: 137 mg/dL (09 Nov 2022 11:14)  POCT Blood Glucose.: 127 mg/dL (09 Nov 2022 05:29)  POCT Blood Glucose.: 196 mg/dL (08 Nov 2022 23:23)  POCT Blood Glucose.: 190 mg/dL (08 Nov 2022 17:21)      MEDICATIONS  (STANDING):  cadexomer iodine 0.9% Gel 1 Application(s) Topical <User Schedule>  chlorhexidine 0.12% Liquid 15 milliLiter(s) Oral Mucosa every 12 hours  chlorhexidine 2% Cloths 1 Application(s) Topical daily  chlorhexidine 2% Cloths 1 Application(s) Topical daily  dextrose 5%. 1000 milliLiter(s) (50 mL/Hr) IV Continuous <Continuous>  dextrose 5%. 1000 milliLiter(s) (100 mL/Hr) IV Continuous <Continuous>  dextrose 50% Injectable 12.5 Gram(s) IV Push once  dextrose 50% Injectable 25 Gram(s) IV Push once  dextrose 50% Injectable 25 Gram(s) IV Push once  doxycycline IVPB 100 milliGRAM(s) IV Intermittent every 12 hours  doxycycline IVPB      glucagon  Injectable 1 milliGRAM(s) IntraMuscular once  heparin   Injectable 5000 Unit(s) SubCutaneous every 12 hours  insulin lispro (ADMELOG) corrective regimen sliding scale   SubCutaneous every 6 hours  lactobacillus acidophilus 1 Tablet(s) Oral daily  LORazepam     Tablet 1 milliGRAM(s) Oral four times a day  midodrine. 10 milliGRAM(s) Oral three times a day  pantoprazole    Tablet 40 milliGRAM(s) Oral before breakfast  polyethylene glycol 3350 17 Gram(s) Oral daily  povidone iodine 10% Solution 1 Application(s) Topical daily  senna 2 Tablet(s) Oral at bedtime  simethicone 80 milliGRAM(s) Chew every 6 hours  trimethoprim / sulfamethoxazole IVPB 250 milliGRAM(s) IV Intermittent every 8 hours

## 2022-11-09 NOTE — PROVIDER CONTACT NOTE (CRITICAL VALUE NOTIFICATION) - ASSESSMENT
Patient is concerned because since she was diagnosed with COVID on 2/18/21 she has been having issues with HTN and Afib. She was taking Losartan/HCTZ 100/12.5 mg 1/2 tablet QHS, but her PCP changed it to Benicar to try to control her BP better. She states that her BP was worse with that medication, so she went back to Losartan/HCTZ. She has been taking the entire pill at night but her BP's have still been 203/103, 165/71 and 183/71. Her HR has been staying in the 70's. She would like to know if her BP medications can be changed for better BP control?   Pt afebrile

## 2022-11-09 NOTE — PROGRESS NOTE ADULT - ASSESSMENT
76 year old female with PMHx of dementia, COPD with chronic respiratory failure s/p trach, cardiac arrest, CHH, quadriplegia, CVA, CKD, anemia, PEG tube, and multiple hospital admissions for respiratory distress presents to the ED BIBEMS from [facility] complaining of fever,    sepsis  chr resp failure  dementia  copd  atelectasis  dysphagia  peg  trach  cva  ckd  anemia    GOC documented  pt is full code   Marciano - HCP    TLC change noted  on TF  ABX adjustment noted - ID follow up - cx noted - biomarkers -

## 2022-11-09 NOTE — PROGRESS NOTE ADULT - SUBJECTIVE AND OBJECTIVE BOX
Date/Time Patient Seen:  		  Referring MD:   Data Reviewed	       Patient is a 76y old  Female who presents with a chief complaint of diabetic ulcerations hallux bilaterally rule out OM (08 Nov 2022 08:43)      Subjective/HPI     PAST MEDICAL & SURGICAL HISTORY:  Dementia of frontal lobe type    Aphasic stroke    Diabetes mellitus    Respiratory failure    Hypertension    GERD (gastroesophageal reflux disease)    Constipation    Respiratory failure    CVA (cerebral vascular accident)    HTN (hypertension)    DM (diabetes mellitus)    Advanced dementia    COVID-19 virus detected    Quadriplegia    Pneumonia    Type II diabetes mellitus    Hx of appendectomy    Gastrostomy in place    Tracheostomy in place    Tracheostomy tube present    Feeding by G-tube          Medication list         MEDICATIONS  (STANDING):  cadexomer iodine 0.9% Gel 1 Application(s) Topical <User Schedule>  chlorhexidine 0.12% Liquid 15 milliLiter(s) Oral Mucosa every 12 hours  chlorhexidine 2% Cloths 1 Application(s) Topical daily  chlorhexidine 2% Cloths 1 Application(s) Topical daily  dextrose 5%. 1000 milliLiter(s) (100 mL/Hr) IV Continuous <Continuous>  dextrose 5%. 1000 milliLiter(s) (50 mL/Hr) IV Continuous <Continuous>  dextrose 50% Injectable 25 Gram(s) IV Push once  dextrose 50% Injectable 12.5 Gram(s) IV Push once  dextrose 50% Injectable 25 Gram(s) IV Push once  doxycycline IVPB 100 milliGRAM(s) IV Intermittent every 12 hours  doxycycline IVPB      glucagon  Injectable 1 milliGRAM(s) IntraMuscular once  heparin   Injectable 5000 Unit(s) SubCutaneous every 12 hours  insulin lispro (ADMELOG) corrective regimen sliding scale   SubCutaneous every 6 hours  lactobacillus acidophilus 1 Tablet(s) Oral daily  LORazepam     Tablet 1 milliGRAM(s) Oral four times a day  midodrine. 10 milliGRAM(s) Oral three times a day  pantoprazole    Tablet 40 milliGRAM(s) Oral before breakfast  polyethylene glycol 3350 17 Gram(s) Oral daily  povidone iodine 10% Solution 1 Application(s) Topical daily  senna 2 Tablet(s) Oral at bedtime  simethicone 80 milliGRAM(s) Chew every 6 hours  trimethoprim / sulfamethoxazole IVPB 250 milliGRAM(s) IV Intermittent every 8 hours    MEDICATIONS  (PRN):  acetaminophen     Tablet .. 650 milliGRAM(s) Oral every 6 hours PRN Temp greater or equal to 38C (100.4F), Mild Pain (1 - 3)  bisacodyl Suppository 10 milliGRAM(s) Rectal daily PRN Constipation  dextrose Oral Gel 15 Gram(s) Oral once PRN Blood Glucose LESS THAN 70 milliGRAM(s)/deciliter  LORazepam   Injectable 2 milliGRAM(s) IV Push every 4 hours PRN Agitation  ondansetron Injectable 4 milliGRAM(s) IV Push every 8 hours PRN Nausea and/or Vomiting  sodium chloride 0.9% lock flush 10 milliLiter(s) IV Push every 1 hour PRN Pre/post blood products, medications, blood draw, and to maintain line patency         Vitals log        ICU Vital Signs Last 24 Hrs  T(C): 37.3 (09 Nov 2022 05:00), Max: 37.3 (09 Nov 2022 05:00)  T(F): 99.2 (09 Nov 2022 05:00), Max: 99.2 (09 Nov 2022 05:00)  HR: 77 (09 Nov 2022 06:03) (57 - 106)  BP: 103/71 (09 Nov 2022 04:00) (97/57 - 171/75)  BP(mean): 82 (09 Nov 2022 04:00) (67 - 107)  ABP: --  ABP(mean): --  RR: 24 (09 Nov 2022 05:00) (16 - 32)  SpO2: 100% (09 Nov 2022 06:03) (97% - 100%)    O2 Parameters below as of 08 Nov 2022 20:00  Patient On (Oxygen Delivery Method): ventilator    O2 Concentration (%): 50         Mode: AC/ CMV (Assist Control/ Continuous Mandatory Ventilation)  RR (machine): 16  TV (machine): 400  FiO2: 70  PEEP: 5  ITime: 1  MAP: 14  PIP: 37      Input and Output:  I&O's Detail    07 Nov 2022 07:01  -  08 Nov 2022 07:00  --------------------------------------------------------  IN:    Enteral Tube Flush: 1000 mL    IV PiggyBack: 100 mL    Pulmocare: 735 mL  Total IN: 1835 mL    OUT:    Voided (mL): 1400 mL  Total OUT: 1400 mL    Total NET: 435 mL      08 Nov 2022 07:01  -  09 Nov 2022 06:26  --------------------------------------------------------  IN:    Enteral Tube Flush: 350 mL    IV PiggyBack: 50 mL    IV PiggyBack: 100 mL    IV PiggyBack: 50 mL    IV PiggyBack: 150 mL    Pulmocare: 805 mL  Total IN: 1505 mL    OUT:    Voided (mL): 950 mL  Total OUT: 950 mL    Total NET: 555 mL          Lab Data                        8.9    6.08  )-----------( 159      ( 08 Nov 2022 06:53 )             29.9     11-08    139  |  104  |  15  ----------------------------<  109<H>  4.2   |  27  |  1.03    Ca    7.8<L>      08 Nov 2022 06:53  Phos  3.7     11-08  Mg     1.3     11-08    TPro  6.3  /  Alb  1.7<L>  /  TBili  0.5  /  DBili  x   /  AST  20  /  ALT  <10<L>  /  AlkPhos  108  11-08            Review of Systems	      Objective     Physical Examination  heart s1s2  lung dec BS  head nc        Pertinent Lab findings & Imaging      Annalise:  NO   Adequate UO     I&O's Detail    07 Nov 2022 07:01  -  08 Nov 2022 07:00  --------------------------------------------------------  IN:    Enteral Tube Flush: 1000 mL    IV PiggyBack: 100 mL    Pulmocare: 735 mL  Total IN: 1835 mL    OUT:    Voided (mL): 1400 mL  Total OUT: 1400 mL    Total NET: 435 mL      08 Nov 2022 07:01  -  09 Nov 2022 06:26  --------------------------------------------------------  IN:    Enteral Tube Flush: 350 mL    IV PiggyBack: 50 mL    IV PiggyBack: 100 mL    IV PiggyBack: 50 mL    IV PiggyBack: 150 mL    Pulmocare: 805 mL  Total IN: 1505 mL    OUT:    Voided (mL): 950 mL  Total OUT: 950 mL    Total NET: 555 mL               Discussed with:     Cultures:	        Radiology

## 2022-11-09 NOTE — PROGRESS NOTE ADULT - SUBJECTIVE AND OBJECTIVE BOX
Chief Complaint: Fever, respiratory distress    Interval Events: No events overnight.    Review of Systems:  Unable to obtain    Physical Exam:  Vital Signs Last 24 Hrs  T(C): 36.6 (09 Nov 2022 08:51), Max: 37.3 (09 Nov 2022 05:00)  T(F): 97.9 (09 Nov 2022 08:51), Max: 99.2 (09 Nov 2022 05:00)  HR: 71 (09 Nov 2022 08:00) (58 - 106)  BP: 111/61 (09 Nov 2022 08:00) (97/57 - 171/75)  BP(mean): 76 (09 Nov 2022 08:00) (67 - 101)  RR: 9 (09 Nov 2022 08:00) (9 - 32)  SpO2: 98% (09 Nov 2022 07:00) (97% - 100%)  Parameters below as of 09 Nov 2022 07:00  Patient On (Oxygen Delivery Method): ventilator  O2 Concentration (%): 50  General: NAD  HEENT: Trach  Neck: No JVD, no carotid bruit  Lungs: CTAB  CV: RRR, nl S1/S2, no M/R/G  Abdomen: S/NT/ND, +BS  Extremities: No LE edema, no cyanosis  Neuro: AAOx0  Skin: No rash    Labs:    11-09    138  |  103  |  11  ----------------------------<  156<H>  4.3   |  28  |  0.99    Ca    8.0<L>      09 Nov 2022 05:55  Phos  4.4     11-09  Mg     2.3     11-09    TPro  7.1  /  Alb  1.8<L>  /  TBili  0.3  /  DBili  x   /  AST  14  /  ALT  <10<L>  /  AlkPhos  104  11-09                        8.6    4.61  )-----------( 180      ( 09 Nov 2022 05:55 )             29.3         ECG/Telemetry: Sinus rhythm

## 2022-11-09 NOTE — PROGRESS NOTE ADULT - SUBJECTIVE AND OBJECTIVE BOX
PROGRESS NOTE   Patient is a 76y old  Female who presents with a chief complaint of Acute on Hypoxemic respiratory failure (07 Nov 2022 12:35) Pt also being seen for diabetic ulcerations hallux distal tips bilaterally.  Pt is unresponsive under ventilation. No acute event overnight. Dressing is clean, dry and intact.     HPI:  76 year old female with PMHx of dementia, COPD with chronic respiratory failure s/p trach, cardiac arrest, CHH, quadriplegia, CVA, CKD, anemia, PEG tube, and multiple hospital admissions for respiratory distress presents to the ED [facility] from [facility] complaining of fever, SOB, and dark sputum output from trach. Pt was discharged 2 days ago after being admitted for respiratory distress and pneumonia. Unable to obtain further history secondary to dementia.    Vital Signs Last 24 Hrs  T(C): 36.1 (09 Nov 2022 15:25), Max: 37.3 (09 Nov 2022 05:00)  T(F): 97 (09 Nov 2022 15:25), Max: 99.2 (09 Nov 2022 05:00)  HR: 59 (09 Nov 2022 14:26) (59 - 106)  BP: 100/56 (09 Nov 2022 13:00) (97/57 - 171/75)  BP(mean): 71 (09 Nov 2022 13:00) (67 - 101)  RR: 18 (09 Nov 2022 13:00) (9 - 32)  SpO2: 100% (09 Nov 2022 14:26) (97% - 100%)    Parameters below as of 09 Nov 2022 11:00  Patient On (Oxygen Delivery Method): ventilator    O2 Concentration (%): 50                                    8.6    4.61  )-----------( 180      ( 09 Nov 2022 05:55 )             29.3   11-09    138  |  103  |  11  ----------------------------<  156<H>  4.3   |  28  |  0.99    Ca    8.0<L>      09 Nov 2022 05:55  Phos  4.4     11-09  Mg     2.3     11-09    TPro  7.1  /  Alb  1.8<L>  /  TBili  0.3  /  DBili  x   /  AST  14  /  ALT  <10<L>  /  AlkPhos  104  11-09      PHYSICAL EXAM  LE Focused:    Derm: Ulceration  distal aspect of the hallux bilaterally  + hyperkeratotic border, wound base Necrotic wound size (2 cm X 1.5cm X .5m) + edema, + jose roberto-wound erythema, - purulence, + fluctuance, + tracking/tunneling, + probe to bone on left wound.  The left foot is more severe than the right foot.  There is clinical concern for underlying OM bilaterally left greater than right. There is contracture of the hallux causing pressure about the distal aspect of the hallux bilaterally.   Vascular: Dorsalis Pedis 2/4 bilat and Posterior Tibial pulses n/p bilat.  Capillary re-fill time less then 3 seconds digits 1-5 bilateral.    Neuro: Protective sensation diminished to the level of the digits bilateral.  MSK: Unable to perform due to patient unresponsive    Radiology 11/8/2022:  Foot X-ray bilaterally : Absence of the tuft of the distal phalanx of the hallux, new. Diffuse demineralization with no fracture

## 2022-11-09 NOTE — PROGRESS NOTE ADULT - ASSESSMENT
REVIEW OF SYMPTOMS      Able to give (reliable) ROS  NO     PHYSICAL EXAM    HEENT Unremarkable  atraumatic   RESP Fair air entry EXP prolonged    Harsh breath sound Resp distres mild   CARDIAC S1 S2 No S3     NO JVD    ABDOMEN SOFT BS PRESENT NOT DISTENDED No hepatosplenomegaly   PEDAL EDEMA present No calf tenderness  NO rash     GENERAL DATA .   GOC.        ALLGY. codeine                           WT.   BMI.                              ICU STAY. 11/4/2022  COVID.  11/4 scv2 (-)     BEST PRACTICE ISSUES.    HOB ELEVATN. Yes  DVT PPLX.  11/4/2022 hpsc    SQUIRES PPLX.  11/4/2022 protonix 40     INFN PPLX.    11/5/2022 chlorhexidine 2%   11/5/2022 chlorhexidine .12%   SP SW EM.        DIET. 11/5/2022 jevity 1.5 30/h gt    VS/ PO/IO/ VENT/ DRIPS.  11/9/2022 afeb 55 100/80  11/9/2022  ac 16/400/5/.5    OVERALL ASSESSMENT/DISPOSITION.  76 f PMH trach peg cva cac anoxic encephalo PH 8/19/2022 pasp 58 bl pl effs  r thora 6/8/2022 and l thora 5/25/2022 were lp exudates  recurrent hospitalizations HO CR psuedomonas 5/2/2022 zosyn 8/19/2022  recent admission 10/18-11/2/2022 uc 10/18 100K E coli  sp 10/19 Stenotrophomonas  10/19-10/26/2022 levaquin 750  10/21/2022 rocephin x 7d Dr BRITTANY Brantley sent back from [facility] 11/4/2022 with fever   Pulm crit care consulted 11/4/2022    .. VAP   w 11/4-11/5-11/7/2022 w 10.9 - 9.1 - 6.8   pr 11/6/2022 .6   ua 11/4/2022 w 11-25   cxr 11/4/2022 persistent advanced bl infiltr cw 11/4/2022 small basl effesns r gr  than l   rvp 11/4 (-)   trach 11/5 stenotrophomonas   uc 11/4 vr enterococc 50-99 candida   bc 11/4 (-)   11/4/2022 levaquin 750 dced  11/7 doxy   11/8 bactrim 250.3     .. Resp distress  11/4 v duplx (-)   On vent fio2 40         TIME SPENT   Over 39 minutes aggregate critical care time spent on encounter; activities included   direct patient care, counseling and/or coordinating care reviewing notes, lab data/ imaging , discussion with multidisciplinary team/ patient  /family and explaining in detail risks, benefits, alternatives  of the recommendations     CHAPINCITO GUIDRY 76 f [facility] S 11/4/2022   DR HIMANSHU RANDHAWA

## 2022-11-09 NOTE — PROVIDER CONTACT NOTE (CRITICAL VALUE NOTIFICATION) - TEST AND RESULT REPORTED:
Urine culture from 11/4/22 show >100,000 Enterococcus faecium (Vancomycin resistant). 50,000-99,000 candida  Albicans (Susceptibilities not performed)

## 2022-11-09 NOTE — CHART NOTE - NSCHARTNOTEFT_GEN_A_CORE
Called by RN, peg tube popped out    GI / Dr. Moncada contacted called for a consult; care d/w Coral, he recommended for a vaughn to be place din tract and balloon inflated so tract doesn't close.    IVF started as pt can't get feeds    Sandeep Buenrostro, Attending Physician
Assessment: Per H&P "76 year old female with PMHx of dementia, COPD with chronic respiratory failure s/p trach, cardiac arrest, CHH, quadriplegia, CVA, CKD, anemia, PEG tube, and multiple hospital admissions for respiratory distress a/w sepsis and acute on chronic hypoxic respiratory failure due to suspected recurrent VAP."      Pt seen for nutrition follow-up. Visited patient in room, observed tube feeds of Pulmocare running at goal with Lucas (7 gm arginine/7 gm glutamine/1.5 gm hmb) x1 packet qd. No reported n/v/d/c, last BM , bowel regimen in place. Pertinent medications/nutrition labs reviewed; noted -171 x24hrs, glucose WNL today, Mg 1.3 (L); receiving antibiotic, In house ordered for lispro sliding scale to aid in glucose management. Consider adding NC Prosource 30ml (60 kcal, 15 g protein) TF for increased protein needs. RD to continue to monitor nutrition status per protocol.     Factors impacting intake: [ ] none [ ] nausea  [ ] vomiting [ ] diarrhea [ ] constipation  [ ]chewing problems [ ] swallowing issues  [ x] other: NPO with tube feed    Diet Prescription: Diet, NPO with Tube Feed:   Tube Feeding Modality: Gastrostomy  Pulmocare  Total Volume for 24 Hours (mL): 840  Continuous  Starting Tube Feed Rate {mL per Hour}: 20  Increase Tube Feed Rate by (mL): 10     Every 8 hours  Until Goal Tube Feed Rate (mL per Hour): 35  Tube Feed Duration (in Hours): 24  Tube Feed Start Time: 08:00  Free Water Flush   Total Volume per Flush (mL): 250   Frequency: Every 6 Hours   Total Daily Volume of Flush (mL): 100    Start Time: 08:00  Lucas(7 Gm Arginine/7 Gm Glut/1.2 Gm HMB     Qty per Day:  1 (22 @ 19:28)    Intake: feeds at goal    Daily Weight in k.5 (2022 03:28)  Weight in k.2 (2022 04:02)  Weight in k.6 (2022 04:02)  Weight in k.7 (2022 03:30)  Weight in k.2 (2022 21:00)    --- weight fluctuated likely secondary to fluid shift, noted with edema    Pertinent Medications: MEDICATIONS  (STANDING):  cadexomer iodine 0.9% Gel 1 Application(s) Topical <User Schedule>  chlorhexidine 0.12% Liquid 15 milliLiter(s) Oral Mucosa every 12 hours  chlorhexidine 2% Cloths 1 Application(s) Topical daily  dextrose 5%. 1000 milliLiter(s) (100 mL/Hr) IV Continuous <Continuous>  dextrose 5%. 1000 milliLiter(s) (50 mL/Hr) IV Continuous <Continuous>  dextrose 50% Injectable 25 Gram(s) IV Push once  dextrose 50% Injectable 12.5 Gram(s) IV Push once  dextrose 50% Injectable 25 Gram(s) IV Push once  doxycycline IVPB      doxycycline IVPB 100 milliGRAM(s) IV Intermittent every 12 hours  glucagon  Injectable 1 milliGRAM(s) IntraMuscular once  heparin   Injectable 5000 Unit(s) SubCutaneous every 12 hours  insulin lispro (ADMELOG) corrective regimen sliding scale   SubCutaneous every 6 hours  lactobacillus acidophilus 1 Tablet(s) Oral daily  levoFLOXacin IVPB 750 milliGRAM(s) IV Intermittent every 24 hours  LORazepam     Tablet 1 milliGRAM(s) Oral four times a day  midodrine. 10 milliGRAM(s) Oral three times a day  pantoprazole    Tablet 40 milliGRAM(s) Oral before breakfast  polyethylene glycol 3350 17 Gram(s) Oral daily  povidone iodine 10% Solution 1 Application(s) Topical daily  senna 2 Tablet(s) Oral at bedtime  simethicone 80 milliGRAM(s) Chew every 6 hours    MEDICATIONS  (PRN):  acetaminophen     Tablet .. 650 milliGRAM(s) Oral every 6 hours PRN Temp greater or equal to 38C (100.4F), Mild Pain (1 - 3)  bisacodyl Suppository 10 milliGRAM(s) Rectal daily PRN Constipation  dextrose Oral Gel 15 Gram(s) Oral once PRN Blood Glucose LESS THAN 70 milliGRAM(s)/deciliter  LORazepam   Injectable 2 milliGRAM(s) IV Push every 4 hours PRN Agitation  ondansetron Injectable 4 milliGRAM(s) IV Push every 8 hours PRN Nausea and/or Vomiting    Pertinent Labs:  Na139 mmol/L Glu 109 mg/dL<H> K+ 4.2 mmol/L Cr  1.03 mg/dL BUN 15 mg/dL  Phos 3.7 mg/dL  Alb 1.7 g/dL<L>     CAPILLARY BLOOD GLUCOSE      POCT Blood Glucose.: 136 mg/dL (2022 11:19)  POCT Blood Glucose.: 112 mg/dL (2022 05:48)  POCT Blood Glucose.: 158 mg/dL (2022 23:27)  POCT Blood Glucose.: 156 mg/dL (2022 17:24)  POCT Blood Glucose.: 171 mg/dL (2022 12:14)      Edema per flowsheets: +2 bl foot  Skin:   Pressure Injury 1: sacrum, Stage II  Pressure Injury 2: Left:,gluteal, Stage IV  Pressure Injury 3: Right:,ankle, Stage II  Pressure Injury 4: Right:,fifth toe, Unstageable  Pressure Injury 5: Right:,first toe, Unstageable  Pressure Injury 6: Left:,first toe, Unstageable      Estimated Needs:   [x ] no change since previous assessment  [ ] recalculated:     Previous Nutrition Diagnosis:   [ x] Inadequate Energy Intake [ ]Inadequate Oral Intake [ ] Excessive Energy Intake   [ ] Underweight [ ] Increased Nutrient Needs [ ] Overweight/Obesity [ ] Altered Nutrition related lab values  [ ] Altered GI Function [ ] Unintended Weight Loss [ ] Food & Nutrition Related Knowledge Deficit [ ] Malnutrition     Nutrition Diagnosis is [ x] ongoing  [ ] resolved [ ] not applicable     New Nutrition Diagnosis: [ x] not applicable       Interventions: continue with current tube feed regimen, consider adding NC Prosource TF 30ml (60 kcal, 15 g protein) to increased needs for protein.   Recommend  [ ] Change Diet To:  [ ] Nutrition Supplement  [ ] Nutrition Support  [ ] Other:     Monitoring and Evaluation:   [X ] Intake [ x ] Tolerance to diet prescription [ x ] weights [ x ] labs[ x ] follow up per protocol  [ ] other:

## 2022-11-09 NOTE — PROVIDER CONTACT NOTE (OTHER) - ASSESSMENT
Pt contracted and arms are rigid against abdomen. PEG tube noted to be dislodged with balloon still inflated.

## 2022-11-09 NOTE — PROGRESS NOTE ADULT - ASSESSMENT
76 year old female with PMHx of dementia, COPD with chronic respiratory failure s/p trach, cardiac arrest, CHH, quadriplegia, CVA, CKD, anemia, PEG tube, and multiple hospital admissions for respiratory distress a/w sepsis and acute on chronic hypoxic respiratory failure due to suspected recurrent VAP.    Severe sepsis and acute hypoxic respiratory failure 2/2 gram-negative PNA   [facility] monitoring  - ID ([facility]), recs appreciated   care d/w Dr. Brantley in ID, pt currently recommended to be on bactrim and doxycycline  - c/w midodrine with hold parameters -- consider titrating dose if BP elevated  - cc/pulm consult (Jaime), recs appreciated  - palliative care (Emre), recs appreciated - pt is full code  f/u cultures  titrate Fio2  - pt has midline placed, IJ pulled today    Gangrenous toes  podiatry consulted  xrays pending    Constipation  dulcolax suppository ordered    Diastolic CHF  Hx of Pleural effusions  - last TTE was 5/30 with EF 65% with normal LVSF, dilated RV, and moderate pHTN -> repeat TTE appears unchanged  - may need repeat thoracentesis (had 1.5L drained few admissions ago), pulmonology consulted; pl effusion was parapneumonic on past admission as opposed to transudative and thus less likely related to CHF  - on vent    Anemia of chronic disease  - has been evaluated by GI and hematology in the past -> dx with anemia of chronic disease with intermittent GI bleeding  - no jacqueline GI bleed per nursing     hx of HTN  - not on any anti-hypertensive meds at facility  - monitor VS    DM2  - NPO with TF  - c/w moderate dose lispro coverage sliding scale q6h  - goal -180 - now at goal - c/w lantus 5un sq QAM

## 2022-11-09 NOTE — PROGRESS NOTE ADULT - PROBLEM SELECTOR PLAN 1
cont local wound care  patient seen with my  Podiatric surgical resident   cont ABX therapy as per ID  awaiting results of radiographs to better evaluate for OM   will cont to follow and discuss with all attendings
Patient seen and evaluated  Patient unresponsive  Wound flush with normal saline  Follow up pending wound cultures  Applied  dry sterile dressing  Reviewed foot xray. Patient may have OM on left foot.  cont antibiotics As Per ID  Offloading to bilateral Heels.   Discussed importance of daily foot examinations and proper shoe gear and to importance of lower Fasting Blood Glucose levels.   Discussed with Attending who agreed   Podiatry will follow while in house.

## 2022-11-09 NOTE — BRIEF OPERATIVE NOTE - OPERATION/FINDINGS
RUE 4 fr power midline inserted via right basilic vein. Performed at bedside.  Catheter may be used.

## 2022-11-09 NOTE — PROGRESS NOTE ADULT - SUBJECTIVE AND OBJECTIVE BOX
BREA BECKHAM     [facility]    Allergies    codeine (Hives)    Intolerances        PAST MEDICAL & SURGICAL HISTORY:  Dementia of frontal lobe type      Aphasic stroke      Diabetes mellitus      Respiratory failure      Hypertension      GERD (gastroesophageal reflux disease)      Constipation      Respiratory failure      CVA (cerebral vascular accident)      HTN (hypertension)      DM (diabetes mellitus)      Advanced dementia      COVID-19 virus detected      Quadriplegia      Pneumonia      Type II diabetes mellitus      Hx of appendectomy      Gastrostomy in place      Tracheostomy in place      Tracheostomy tube present      Feeding by G-tube          FAMILY HISTORY:  No pertinent family history in first degree relatives        Home Medications:  albuterol 90 mcg/inh inhalation aerosol with adapter: 2  inhaled every 6 hours (18 Oct 2022 11:42)  Bacid (LAC) oral tablet: 2 tab(s) by gastrostomy tube once a day (18 Oct 2022 11:42)  Betadine 10% topical swab: cleanse right and left great toes (18 Oct 2022 11:42)  chlorhexidine 0.12% mucous membrane liquid: 15 milliliter(s) mucous membrane 2 times a day (18 Oct 2022 11:42)  Eucerin topical cream: Apply topically to affected area once a day bilateral feet (18 Oct 2022 11:42)  insulin glargine 100 units/mL subcutaneous solution: 8 unit(s) subcutaneous once a day (in the morning) (18 Oct 2022 11:42)  ipratropium-albuterol 0.5 mg-2.5 mg/3 mL inhalation solution: 3 milliliter(s) inhaled 4 times a day (18 Oct 2022 11:42)  LORazepam 1 mg oral tablet: 1 tab(s) by gastrostomy tube every 4 hours (18 Oct 2022 11:42)  midodrine 10 mg oral tablet: 1 tab(s) orally every 8 hours (02 Nov 2022 11:44)  MiraLax oral powder for reconstitution: g-tube (18 Oct 2022 13:04)  Multiple Vitamins oral tablet: 1 tab(s) orally once a day (18 Oct 2022 11:42)  nystatin 100,000 units/g topical powder: 1 application topically 3 times a day (18 Oct 2022 11:42)  omeprazole 20 mg oral delayed release capsule: orally 2 times a day (18 Oct 2022 11:42)  polyethylene glycol 3350 oral powder for reconstitution: 17 gram(s) by gastrostomy tube every 12 hours (18 Oct 2022 11:42)  senna 8.6 mg oral tablet: 3 tab(s) by gastrostomy tube once a day (at bedtime) (18 Oct 2022 11:42)  simethicone 80 mg oral tablet, chewable: 1 tab(s) by gastrostomy tube every 6 hours (18 Oct 2022 11:42)  sucralfate 1 g/10 mL oral suspension: 10 milliliter(s) g-tube 4 times a day (before meals and at bedtime) (18 Oct 2022 13:04)  Tylenol 325 mg oral tablet: 2 tab(s) by gastrostomy tube once a day; 60 minutes prior to dressing change  (18 Oct 2022 11:42)  Tylenol 325 mg oral tablet: 2 tab(s) by gastrostomy tube every 6 hours, As Needed (18 Oct 2022 11:42)      MEDICATIONS  (STANDING):  cadexomer iodine 0.9% Gel 1 Application(s) Topical <User Schedule>  chlorhexidine 0.12% Liquid 15 milliLiter(s) Oral Mucosa every 12 hours  chlorhexidine 2% Cloths 1 Application(s) Topical daily  chlorhexidine 2% Cloths 1 Application(s) Topical daily  dextrose 5%. 1000 milliLiter(s) (50 mL/Hr) IV Continuous <Continuous>  dextrose 5%. 1000 milliLiter(s) (100 mL/Hr) IV Continuous <Continuous>  dextrose 50% Injectable 12.5 Gram(s) IV Push once  dextrose 50% Injectable 25 Gram(s) IV Push once  dextrose 50% Injectable 25 Gram(s) IV Push once  doxycycline IVPB      doxycycline IVPB 100 milliGRAM(s) IV Intermittent every 12 hours  glucagon  Injectable 1 milliGRAM(s) IntraMuscular once  heparin   Injectable 5000 Unit(s) SubCutaneous every 12 hours  insulin lispro (ADMELOG) corrective regimen sliding scale   SubCutaneous every 6 hours  lactobacillus acidophilus 1 Tablet(s) Oral daily  LORazepam     Tablet 1 milliGRAM(s) Oral four times a day  midodrine. 10 milliGRAM(s) Oral three times a day  pantoprazole    Tablet 40 milliGRAM(s) Oral before breakfast  polyethylene glycol 3350 17 Gram(s) Oral daily  povidone iodine 10% Solution 1 Application(s) Topical daily  senna 2 Tablet(s) Oral at bedtime  simethicone 80 milliGRAM(s) Chew every 6 hours  trimethoprim / sulfamethoxazole IVPB 250 milliGRAM(s) IV Intermittent every 8 hours    MEDICATIONS  (PRN):  acetaminophen     Tablet .. 650 milliGRAM(s) Oral every 6 hours PRN Temp greater or equal to 38C (100.4F), Mild Pain (1 - 3)  bisacodyl Suppository 10 milliGRAM(s) Rectal daily PRN Constipation  dextrose Oral Gel 15 Gram(s) Oral once PRN Blood Glucose LESS THAN 70 milliGRAM(s)/deciliter  LORazepam   Injectable 2 milliGRAM(s) IV Push every 4 hours PRN Agitation  ondansetron Injectable 4 milliGRAM(s) IV Push every 8 hours PRN Nausea and/or Vomiting  sodium chloride 0.9% lock flush 10 milliLiter(s) IV Push every 1 hour PRN Pre/post blood products, medications, blood draw, and to maintain line patency      Diet, NPO with Tube Feed:   Tube Feeding Modality: Gastrostomy  Pulmocare  Total Volume for 24 Hours (mL): 840  Continuous  Starting Tube Feed Rate mL per Hour: 20  Increase Tube Feed Rate by (mL): 10     Every 8 hours  Until Goal Tube Feed Rate (mL per Hour): 35  Tube Feed Duration (in Hours): 24  Tube Feed Start Time: 08:00  Free Water Flush   Total Volume per Flush (mL): 250   Frequency: Every 6 Hours   Total Daily Volume of Flush (mL): 100    Start Time: 08:00  Lucas(7 Gm Arginine/7 Gm Glut/1.2 Gm HMB     Qty per Day:  1 (11-06-22 @ 19:28) [Active]  Diet, NPO with Tube Feed:   Tube Feeding Modality: Gastrostomy  Pulmocare  Total Volume for 24 Hours (mL): 840  Continuous  Starting Tube Feed Rate mL per Hour: 20  Increase Tube Feed Rate by (mL): 10     Every 8 hours  Until Goal Tube Feed Rate (mL per Hour): 35  Tube Feed Duration (in Hours): 24  Tube Feed Start Time: 12:00  Free Water Flush  Bolus   Total Volume per Flush (mL): 250   Frequency: Every 6 Hours  Lucas(7 Gm Arginine/7 Gm Glut/1.2 Gm HMB     Qty per Day:  1  No Carb Prosource TF     Qty per Day:  1 (11-05-22 @ 15:04) [Pending Verification By Attending]          Vital Signs Last 24 Hrs  T(C): 37.3 (09 Nov 2022 05:00), Max: 37.3 (09 Nov 2022 05:00)  T(F): 99.2 (09 Nov 2022 05:00), Max: 99.2 (09 Nov 2022 05:00)  HR: 85 (09 Nov 2022 07:00) (57 - 106)  BP: 108/76 (09 Nov 2022 07:00) (97/57 - 171/75)  BP(mean): 87 (09 Nov 2022 07:00) (67 - 107)  RR: 24 (09 Nov 2022 07:00) (16 - 32)  SpO2: 98% (09 Nov 2022 07:00) (97% - 100%)    Parameters below as of 09 Nov 2022 07:00  Patient On (Oxygen Delivery Method): ventilator    O2 Concentration (%): 50      11-08-22 @ 07:01  -  11-09-22 @ 07:00  --------------------------------------------------------  IN: 1505 mL / OUT: 950 mL / NET: 555 mL        Mode: AC/ CMV (Assist Control/ Continuous Mandatory Ventilation), RR (machine): 16, TV (machine): 400, FiO2: 50, PEEP: 5, ITime: 1, MAP: 15, PIP: 40      LABS:                        8.6    4.61  )-----------( 180      ( 09 Nov 2022 05:55 )             29.3     11-09    138  |  103  |  11  ----------------------------<  156<H>  4.3   |  28  |  0.99    Ca    8.0<L>      09 Nov 2022 05:55  Phos  4.4     11-09  Mg     2.3     11-09    TPro  7.1  /  Alb  1.8<L>  /  TBili  0.3  /  DBili  x   /  AST  14  /  ALT  <10<L>  /  AlkPhos  104  11-09              WBC:  WBC Count: 4.61 K/uL (11-09 @ 05:55)  WBC Count: 6.08 K/uL (11-08 @ 06:53)  WBC Count: 6.86 K/uL (11-07 @ 05:41)  WBC Count: 10.04 K/uL (11-06 @ 06:27)      MICROBIOLOGY:  RECENT CULTURES:  11-05 Trach Asp Tracheal Aspirate Stenotrophomonas maltophilia   Few Squamous epithelial cells per low power field  Few polymorphonuclear leukocytes per low power field  Rare Gram Positive Rods per oil power field  Rare Gram Negative Rods per oil power field   Numerous Stenotrophomonas maltophilia Susceptibility to follow.    11-04 Clean Catch Clean Catch (Midstream) Enterococcus faecium (vancomycin resistant) XXXX   >100,000 CFU/ml Enterococcus faecium (vancomycin resistant)  50,000 - 99,000 CFU/mL Candida albicans "Susceptibilities not performed"    11-04 .Blood Blood-Peripheral XXXX XXXX   No growth to date.                    Sodium:  Sodium, Serum: 138 mmol/L (11-09 @ 05:55)  Sodium, Serum: 139 mmol/L (11-08 @ 06:53)  Sodium, Serum: 135 mmol/L (11-07 @ 05:41)  Sodium, Serum: 136 mmol/L (11-06 @ 06:27)      0.99 mg/dL 11-09 @ 05:55  1.03 mg/dL 11-08 @ 06:53  1.12 mg/dL 11-07 @ 05:41  0.93 mg/dL 11-06 @ 06:27      Hemoglobin:  Hemoglobin: 8.6 g/dL (11-09 @ 05:55)  Hemoglobin: 8.9 g/dL (11-08 @ 06:53)  Hemoglobin: 8.8 g/dL (11-07 @ 05:41)  Hemoglobin: 10.2 g/dL (11-06 @ 06:27)      Platelets: Platelet Count - Automated: 180 K/uL (11-09 @ 05:55)  Platelet Count - Automated: 159 K/uL (11-08 @ 06:53)  Platelet Count - Automated: 148 K/uL (11-07 @ 05:41)  Platelet Count - Automated: 180 K/uL (11-06 @ 06:27)      LIVER FUNCTIONS - ( 09 Nov 2022 05:55 )  Alb: 1.8 g/dL / Pro: 7.1 g/dL / ALK PHOS: 104 U/L / ALT: <10 U/L DA / AST: 14 U/L / GGT: x                 RADIOLOGY & ADDITIONAL STUDIES:      MICROBIOLOGY:  RECENT CULTURES:  11-05 Trach Asp Tracheal Aspirate Stenotrophomonas maltophilia   Few Squamous epithelial cells per low power field  Few polymorphonuclear leukocytes per low power field  Rare Gram Positive Rods per oil power field  Rare Gram Negative Rods per oil power field   Numerous Stenotrophomonas maltophilia Susceptibility to follow.    11-04 Clean Catch Clean Catch (Midstream) Enterococcus faecium (vancomycin resistant) XXXX   >100,000 CFU/ml Enterococcus faecium (vancomycin resistant)  50,000 - 99,000 CFU/mL Candida albicans "Susceptibilities not performed"    11-04 .Blood Blood-Peripheral XXXX XXXX   No growth to date.

## 2022-11-10 ENCOUNTER — TRANSCRIPTION ENCOUNTER (OUTPATIENT)
Age: 77
End: 2022-11-10

## 2022-11-10 VITALS — OXYGEN SATURATION: 100 % | HEART RATE: 72 BPM

## 2022-11-10 LAB
-  AMIKACIN: SIGNIFICANT CHANGE UP
-  AMPICILLIN/SULBACTAM: SIGNIFICANT CHANGE UP
-  CEFEPIME: SIGNIFICANT CHANGE UP
-  CEFTAZIDIME: SIGNIFICANT CHANGE UP
-  CIPROFLOXACIN: SIGNIFICANT CHANGE UP
-  GENTAMICIN: SIGNIFICANT CHANGE UP
-  IMIPENEM: SIGNIFICANT CHANGE UP
-  LEVOFLOXACIN: SIGNIFICANT CHANGE UP
-  MEROPENEM: SIGNIFICANT CHANGE UP
-  PIPERACILLIN/TAZOBACTAM: SIGNIFICANT CHANGE UP
-  TOBRAMYCIN: SIGNIFICANT CHANGE UP
-  TRIMETHOPRIM/SULFAMETHOXAZOLE: SIGNIFICANT CHANGE UP
ANION GAP SERPL CALC-SCNC: 5 MMOL/L — SIGNIFICANT CHANGE UP (ref 5–17)
BUN SERPL-MCNC: 9 MG/DL — SIGNIFICANT CHANGE UP (ref 7–23)
CALCIUM SERPL-MCNC: 7.9 MG/DL — LOW (ref 8.4–10.5)
CHLORIDE SERPL-SCNC: 105 MMOL/L — SIGNIFICANT CHANGE UP (ref 96–108)
CO2 SERPL-SCNC: 29 MMOL/L — SIGNIFICANT CHANGE UP (ref 22–31)
CREAT SERPL-MCNC: 1.07 MG/DL — SIGNIFICANT CHANGE UP (ref 0.5–1.3)
EGFR: 53 ML/MIN/1.73M2 — LOW
GLUCOSE BLDC GLUCOMTR-MCNC: 113 MG/DL — HIGH (ref 70–99)
GLUCOSE BLDC GLUCOMTR-MCNC: 121 MG/DL — HIGH (ref 70–99)
GLUCOSE SERPL-MCNC: 142 MG/DL — HIGH (ref 70–99)
HCT VFR BLD CALC: 29.8 % — LOW (ref 34.5–45)
HGB BLD-MCNC: 8.7 G/DL — LOW (ref 11.5–15.5)
MCHC RBC-ENTMCNC: 26.9 PG — LOW (ref 27–34)
MCHC RBC-ENTMCNC: 29.2 GM/DL — LOW (ref 32–36)
MCV RBC AUTO: 92 FL — SIGNIFICANT CHANGE UP (ref 80–100)
METHOD TYPE: SIGNIFICANT CHANGE UP
METHOD TYPE: SIGNIFICANT CHANGE UP
NRBC # BLD: 0 /100 WBCS — SIGNIFICANT CHANGE UP (ref 0–0)
PLATELET # BLD AUTO: 164 K/UL — SIGNIFICANT CHANGE UP (ref 150–400)
POTASSIUM SERPL-MCNC: 4.4 MMOL/L — SIGNIFICANT CHANGE UP (ref 3.5–5.3)
POTASSIUM SERPL-SCNC: 4.4 MMOL/L — SIGNIFICANT CHANGE UP (ref 3.5–5.3)
RBC # BLD: 3.24 M/UL — LOW (ref 3.8–5.2)
RBC # FLD: 16.6 % — HIGH (ref 10.3–14.5)
SARS-COV-2 RNA SPEC QL NAA+PROBE: SIGNIFICANT CHANGE UP
SODIUM SERPL-SCNC: 139 MMOL/L — SIGNIFICANT CHANGE UP (ref 135–145)
WBC # BLD: 4.56 K/UL — SIGNIFICANT CHANGE UP (ref 3.8–10.5)
WBC # FLD AUTO: 4.56 K/UL — SIGNIFICANT CHANGE UP (ref 3.8–10.5)

## 2022-11-10 PROCEDURE — 81001 URINALYSIS AUTO W/SCOPE: CPT

## 2022-11-10 PROCEDURE — 93005 ELECTROCARDIOGRAM TRACING: CPT

## 2022-11-10 PROCEDURE — 84145 PROCALCITONIN (PCT): CPT

## 2022-11-10 PROCEDURE — 87205 SMEAR GRAM STAIN: CPT

## 2022-11-10 PROCEDURE — 96375 TX/PRO/DX INJ NEW DRUG ADDON: CPT

## 2022-11-10 PROCEDURE — 85025 COMPLETE CBC W/AUTO DIFF WBC: CPT

## 2022-11-10 PROCEDURE — 84100 ASSAY OF PHOSPHORUS: CPT

## 2022-11-10 PROCEDURE — 96365 THER/PROPH/DIAG IV INF INIT: CPT

## 2022-11-10 PROCEDURE — 85027 COMPLETE CBC AUTOMATED: CPT

## 2022-11-10 PROCEDURE — 36569 INSJ PICC 5 YR+ W/O IMAGING: CPT

## 2022-11-10 PROCEDURE — 87184 SC STD DISK METHOD PER PLATE: CPT

## 2022-11-10 PROCEDURE — 87070 CULTURE OTHR SPECIMN AEROBIC: CPT

## 2022-11-10 PROCEDURE — 85610 PROTHROMBIN TIME: CPT

## 2022-11-10 PROCEDURE — 82962 GLUCOSE BLOOD TEST: CPT

## 2022-11-10 PROCEDURE — 87181 SC STD AGAR DILUTION PER AGT: CPT

## 2022-11-10 PROCEDURE — 71045 X-RAY EXAM CHEST 1 VIEW: CPT

## 2022-11-10 PROCEDURE — 93970 EXTREMITY STUDY: CPT

## 2022-11-10 PROCEDURE — 80048 BASIC METABOLIC PNL TOTAL CA: CPT

## 2022-11-10 PROCEDURE — 87040 BLOOD CULTURE FOR BACTERIA: CPT

## 2022-11-10 PROCEDURE — 94799 UNLISTED PULMONARY SVC/PX: CPT

## 2022-11-10 PROCEDURE — 99233 SBSQ HOSP IP/OBS HIGH 50: CPT

## 2022-11-10 PROCEDURE — C1751: CPT

## 2022-11-10 PROCEDURE — 87086 URINE CULTURE/COLONY COUNT: CPT

## 2022-11-10 PROCEDURE — 94760 N-INVAS EAR/PLS OXIMETRY 1: CPT

## 2022-11-10 PROCEDURE — 99291 CRITICAL CARE FIRST HOUR: CPT | Mod: 25

## 2022-11-10 PROCEDURE — 73630 X-RAY EXAM OF FOOT: CPT

## 2022-11-10 PROCEDURE — 87186 SC STD MICRODIL/AGAR DIL: CPT

## 2022-11-10 PROCEDURE — 36573 INSJ PICC RS&I 5 YR+: CPT

## 2022-11-10 PROCEDURE — 36415 COLL VENOUS BLD VENIPUNCTURE: CPT

## 2022-11-10 PROCEDURE — 83735 ASSAY OF MAGNESIUM: CPT

## 2022-11-10 PROCEDURE — 80053 COMPREHEN METABOLIC PANEL: CPT

## 2022-11-10 PROCEDURE — 87077 CULTURE AEROBIC IDENTIFY: CPT

## 2022-11-10 PROCEDURE — 85730 THROMBOPLASTIN TIME PARTIAL: CPT

## 2022-11-10 PROCEDURE — 0225U NFCT DS DNA&RNA 21 SARSCOV2: CPT

## 2022-11-10 PROCEDURE — 83605 ASSAY OF LACTIC ACID: CPT

## 2022-11-10 PROCEDURE — 87635 SARS-COV-2 COVID-19 AMP PRB: CPT

## 2022-11-10 PROCEDURE — 76937 US GUIDE VASCULAR ACCESS: CPT

## 2022-11-10 PROCEDURE — 94003 VENT MGMT INPAT SUBQ DAY: CPT

## 2022-11-10 PROCEDURE — 94002 VENT MGMT INPAT INIT DAY: CPT

## 2022-11-10 RX ORDER — POLYETHYLENE GLYCOL 3350 17 G/17G
0 POWDER, FOR SOLUTION ORAL
Qty: 0 | Refills: 0 | DISCHARGE

## 2022-11-10 RX ORDER — ACETAMINOPHEN 500 MG
2 TABLET ORAL
Qty: 0 | Refills: 0 | DISCHARGE

## 2022-11-10 RX ORDER — FENTANYL CITRATE 50 UG/ML
50 INJECTION INTRAVENOUS ONCE
Refills: 0 | Status: DISCONTINUED | OUTPATIENT
Start: 2022-11-10 | End: 2022-11-10

## 2022-11-10 RX ADMIN — CHLORHEXIDINE GLUCONATE 1 APPLICATION(S): 213 SOLUTION TOPICAL at 12:14

## 2022-11-10 RX ADMIN — POLYETHYLENE GLYCOL 3350 17 GRAM(S): 17 POWDER, FOR SOLUTION ORAL at 12:32

## 2022-11-10 RX ADMIN — Medication 1 TABLET(S): at 12:13

## 2022-11-10 RX ADMIN — MIDODRINE HYDROCHLORIDE 10 MILLIGRAM(S): 2.5 TABLET ORAL at 12:13

## 2022-11-10 RX ADMIN — HEPARIN SODIUM 5000 UNIT(S): 5000 INJECTION INTRAVENOUS; SUBCUTANEOUS at 05:25

## 2022-11-10 RX ADMIN — Medication 1 APPLICATION(S): at 11:47

## 2022-11-10 RX ADMIN — SIMETHICONE 80 MILLIGRAM(S): 80 TABLET, CHEWABLE ORAL at 12:13

## 2022-11-10 RX ADMIN — Medication 2 MILLIGRAM(S): at 15:45

## 2022-11-10 RX ADMIN — Medication 1 MILLIGRAM(S): at 13:20

## 2022-11-10 RX ADMIN — Medication 2 MILLIGRAM(S): at 07:46

## 2022-11-10 RX ADMIN — CHLORHEXIDINE GLUCONATE 15 MILLILITER(S): 213 SOLUTION TOPICAL at 05:27

## 2022-11-10 RX ADMIN — Medication 2 MILLIGRAM(S): at 14:40

## 2022-11-10 RX ADMIN — Medication 265.62 MILLIGRAM(S): at 13:43

## 2022-11-10 RX ADMIN — Medication 1 APPLICATION(S): at 12:32

## 2022-11-10 RX ADMIN — FENTANYL CITRATE 50 MICROGRAM(S): 50 INJECTION INTRAVENOUS at 10:24

## 2022-11-10 RX ADMIN — SODIUM CHLORIDE 75 MILLILITER(S): 9 INJECTION, SOLUTION INTRAVENOUS at 07:46

## 2022-11-10 RX ADMIN — FENTANYL CITRATE 50 MICROGRAM(S): 50 INJECTION INTRAVENOUS at 10:40

## 2022-11-10 RX ADMIN — Medication 100 MILLIGRAM(S): at 05:27

## 2022-11-10 RX ADMIN — Medication 265.62 MILLIGRAM(S): at 06:19

## 2022-11-10 NOTE — PROGRESS NOTE ADULT - SUBJECTIVE AND OBJECTIVE BOX
SUBJECTIVE:    Overnight, peg tube got dislodged, GI called, recommended to have vaughn placed and inserted in tract to keep tract open.  Afebrile, hds.    REVIEW OF SYSTEMS:    unable to elicit, patient vented    VITAL SIGNS:    Vital Signs Last 24 Hrs  T(C): 36.6 (10 Nov 2022 06:00), Max: 37.1 (09 Nov 2022 22:38)  T(F): 97.8 (10 Nov 2022 06:00), Max: 98.8 (09 Nov 2022 22:38)  HR: 67 (10 Nov 2022 07:18) (53 - 75)  BP: 131/80 (10 Nov 2022 06:00) (97/52 - 131/80)  BP(mean): 98 (10 Nov 2022 06:00) (66 - 110)  RR: 28 (10 Nov 2022 06:00) (11 - 38)  SpO2: 98% (10 Nov 2022 07:18) (50% - 100%)    Parameters below as of 10 Nov 2022 06:00      O2 Concentration (%): 50    PHYSICAL EXAM:     GENERAL: vented  HEENT: EOMI, neck supple, MMM  RESPIRATORY: measured anteriorly, some coarse bs  CARDIOVASCULAR: RRR, no murmurs, gallops, rubs  ABDOMINAL: soft, non-tender, non-distended, positive bowel sounds   EXTREMITIES: no clubbing, cyanosis, or edema  NEUROLOGICAL: alert and oriented x 3, non-focal  SKIN: no rashes or lesions   MUSCULOSKELETAL: no gross joint deformity                          8.7    4.56  )-----------( 164      ( 10 Nov 2022 06:19 )             29.8     11-10    139  |  105  |  9   ----------------------------<  142<H>  4.4   |  29  |  1.07    Ca    7.9<L>      10 Nov 2022 06:19  Phos  4.4     11-09  Mg     2.3     11-09    TPro  7.1  /  Alb  1.8<L>  /  TBili  0.3  /  DBili  x   /  AST  14  /  ALT  <10<L>  /  AlkPhos  104  11-09      CAPILLARY BLOOD GLUCOSE      POCT Blood Glucose.: 121 mg/dL (10 Nov 2022 05:19)  POCT Blood Glucose.: 136 mg/dL (09 Nov 2022 23:38)  POCT Blood Glucose.: 145 mg/dL (09 Nov 2022 17:47)  POCT Blood Glucose.: 137 mg/dL (09 Nov 2022 11:14)      MEDICATIONS  (STANDING):  cadexomer iodine 0.9% Gel 1 Application(s) Topical <User Schedule>  chlorhexidine 0.12% Liquid 15 milliLiter(s) Oral Mucosa every 12 hours  chlorhexidine 2% Cloths 1 Application(s) Topical daily  chlorhexidine 2% Cloths 1 Application(s) Topical daily  dextrose 5% + sodium chloride 0.45%. 1000 milliLiter(s) (75 mL/Hr) IV Continuous <Continuous>  dextrose 5%. 1000 milliLiter(s) (100 mL/Hr) IV Continuous <Continuous>  dextrose 5%. 1000 milliLiter(s) (50 mL/Hr) IV Continuous <Continuous>  dextrose 50% Injectable 25 Gram(s) IV Push once  dextrose 50% Injectable 12.5 Gram(s) IV Push once  dextrose 50% Injectable 25 Gram(s) IV Push once  doxycycline IVPB      doxycycline IVPB 100 milliGRAM(s) IV Intermittent every 12 hours  glucagon  Injectable 1 milliGRAM(s) IntraMuscular once  heparin   Injectable 5000 Unit(s) SubCutaneous every 12 hours  insulin lispro (ADMELOG) corrective regimen sliding scale   SubCutaneous every 6 hours  lactobacillus acidophilus 1 Tablet(s) Oral daily  LORazepam     Tablet 1 milliGRAM(s) Oral four times a day  midodrine. 10 milliGRAM(s) Oral three times a day  pantoprazole    Tablet 40 milliGRAM(s) Oral before breakfast  polyethylene glycol 3350 17 Gram(s) Oral daily  povidone iodine 10% Solution 1 Application(s) Topical daily  senna 2 Tablet(s) Oral at bedtime  simethicone 80 milliGRAM(s) Chew every 6 hours  sodium chloride 0.9%. 1000 milliLiter(s) (50 mL/Hr) IV Continuous <Continuous>  trimethoprim / sulfamethoxazole IVPB 250 milliGRAM(s) IV Intermittent every 8 hours       SUBJECTIVE:    Overnight, peg tube got dislodged, GI called, recommended to have vaughn placed and inserted in tract to keep tract open.  Afebrile, hds.    REVIEW OF SYSTEMS:    unable to elicit, patient vented    VITAL SIGNS:    Vital Signs Last 24 Hrs  T(C): 36.6 (10 Nov 2022 06:00), Max: 37.1 (09 Nov 2022 22:38)  T(F): 97.8 (10 Nov 2022 06:00), Max: 98.8 (09 Nov 2022 22:38)  HR: 67 (10 Nov 2022 07:18) (53 - 75)  BP: 131/80 (10 Nov 2022 06:00) (97/52 - 131/80)  BP(mean): 98 (10 Nov 2022 06:00) (66 - 110)  RR: 28 (10 Nov 2022 06:00) (11 - 38)  SpO2: 98% (10 Nov 2022 07:18) (50% - 100%)    Parameters below as of 10 Nov 2022 06:00      O2 Concentration (%): 50    PHYSICAL EXAM:     GENERAL: vented  HEENT: EOMI, neck supple, MMM  RESPIRATORY: measured anteriorly, some coarse bs  CARDIOVASCULAR: RRR, no murmurs, gallops, rubs  ABDOMINAL: soft, non-tender, non-distended, positive bowel sounds ; + peg tube  EXTREMITIES: no clubbing, cyanosis, or edema  NEUROLOGICAL: alert and oriented x 3, non-focal  SKIN: no rashes or lesions   MUSCULOSKELETAL: no gross joint deformity                          8.7    4.56  )-----------( 164      ( 10 Nov 2022 06:19 )             29.8     11-10    139  |  105  |  9   ----------------------------<  142<H>  4.4   |  29  |  1.07    Ca    7.9<L>      10 Nov 2022 06:19  Phos  4.4     11-09  Mg     2.3     11-09    TPro  7.1  /  Alb  1.8<L>  /  TBili  0.3  /  DBili  x   /  AST  14  /  ALT  <10<L>  /  AlkPhos  104  11-09      CAPILLARY BLOOD GLUCOSE      POCT Blood Glucose.: 121 mg/dL (10 Nov 2022 05:19)  POCT Blood Glucose.: 136 mg/dL (09 Nov 2022 23:38)  POCT Blood Glucose.: 145 mg/dL (09 Nov 2022 17:47)  POCT Blood Glucose.: 137 mg/dL (09 Nov 2022 11:14)      MEDICATIONS  (STANDING):  cadexomer iodine 0.9% Gel 1 Application(s) Topical <User Schedule>  chlorhexidine 0.12% Liquid 15 milliLiter(s) Oral Mucosa every 12 hours  chlorhexidine 2% Cloths 1 Application(s) Topical daily  chlorhexidine 2% Cloths 1 Application(s) Topical daily  dextrose 5% + sodium chloride 0.45%. 1000 milliLiter(s) (75 mL/Hr) IV Continuous <Continuous>  dextrose 5%. 1000 milliLiter(s) (100 mL/Hr) IV Continuous <Continuous>  dextrose 5%. 1000 milliLiter(s) (50 mL/Hr) IV Continuous <Continuous>  dextrose 50% Injectable 25 Gram(s) IV Push once  dextrose 50% Injectable 12.5 Gram(s) IV Push once  dextrose 50% Injectable 25 Gram(s) IV Push once  doxycycline IVPB      doxycycline IVPB 100 milliGRAM(s) IV Intermittent every 12 hours  glucagon  Injectable 1 milliGRAM(s) IntraMuscular once  heparin   Injectable 5000 Unit(s) SubCutaneous every 12 hours  insulin lispro (ADMELOG) corrective regimen sliding scale   SubCutaneous every 6 hours  lactobacillus acidophilus 1 Tablet(s) Oral daily  LORazepam     Tablet 1 milliGRAM(s) Oral four times a day  midodrine. 10 milliGRAM(s) Oral three times a day  pantoprazole    Tablet 40 milliGRAM(s) Oral before breakfast  polyethylene glycol 3350 17 Gram(s) Oral daily  povidone iodine 10% Solution 1 Application(s) Topical daily  senna 2 Tablet(s) Oral at bedtime  simethicone 80 milliGRAM(s) Chew every 6 hours  sodium chloride 0.9%. 1000 milliLiter(s) (50 mL/Hr) IV Continuous <Continuous>  trimethoprim / sulfamethoxazole IVPB 250 milliGRAM(s) IV Intermittent every 8 hours

## 2022-11-10 NOTE — CONSULT NOTE ADULT - CONSULT REQUESTED DATE/TIME
04-Nov-2022
04-Nov-2022 19:18
10-Nov-2022 10:51
04-Nov-2022 22:51
04-Nov-2022 14:05
04-Nov-2022 16:25
24-Aug-2022 11:25
07-Nov-2022 16:23
No

## 2022-11-10 NOTE — PROGRESS NOTE ADULT - SUBJECTIVE AND OBJECTIVE BOX
Date/Time Patient Seen:  		  Referring MD:   Data Reviewed	       Patient is a 76y old  Female who presents with a chief complaint of diabetic ulcerations hallux bilaterally rule out OM (09 Nov 2022 16:20)      Subjective/HPI     PAST MEDICAL & SURGICAL HISTORY:  Dementia of frontal lobe type    Aphasic stroke    Diabetes mellitus    Respiratory failure    Hypertension    GERD (gastroesophageal reflux disease)    Constipation    Respiratory failure    CVA (cerebral vascular accident)    HTN (hypertension)    DM (diabetes mellitus)    Advanced dementia    COVID-19 virus detected    Quadriplegia    Pneumonia    Type II diabetes mellitus    Hx of appendectomy    Gastrostomy in place    Tracheostomy in place    Tracheostomy tube present    Feeding by G-tube          Medication list         MEDICATIONS  (STANDING):  cadexomer iodine 0.9% Gel 1 Application(s) Topical <User Schedule>  chlorhexidine 0.12% Liquid 15 milliLiter(s) Oral Mucosa every 12 hours  chlorhexidine 2% Cloths 1 Application(s) Topical daily  chlorhexidine 2% Cloths 1 Application(s) Topical daily  dextrose 5% + sodium chloride 0.45%. 1000 milliLiter(s) (75 mL/Hr) IV Continuous <Continuous>  dextrose 5%. 1000 milliLiter(s) (50 mL/Hr) IV Continuous <Continuous>  dextrose 5%. 1000 milliLiter(s) (100 mL/Hr) IV Continuous <Continuous>  dextrose 50% Injectable 25 Gram(s) IV Push once  dextrose 50% Injectable 12.5 Gram(s) IV Push once  dextrose 50% Injectable 25 Gram(s) IV Push once  doxycycline IVPB      doxycycline IVPB 100 milliGRAM(s) IV Intermittent every 12 hours  glucagon  Injectable 1 milliGRAM(s) IntraMuscular once  heparin   Injectable 5000 Unit(s) SubCutaneous every 12 hours  insulin lispro (ADMELOG) corrective regimen sliding scale   SubCutaneous every 6 hours  lactobacillus acidophilus 1 Tablet(s) Oral daily  LORazepam     Tablet 1 milliGRAM(s) Oral four times a day  midodrine. 10 milliGRAM(s) Oral three times a day  pantoprazole    Tablet 40 milliGRAM(s) Oral before breakfast  polyethylene glycol 3350 17 Gram(s) Oral daily  povidone iodine 10% Solution 1 Application(s) Topical daily  senna 2 Tablet(s) Oral at bedtime  simethicone 80 milliGRAM(s) Chew every 6 hours  sodium chloride 0.9%. 1000 milliLiter(s) (50 mL/Hr) IV Continuous <Continuous>  trimethoprim / sulfamethoxazole IVPB 250 milliGRAM(s) IV Intermittent every 8 hours    MEDICATIONS  (PRN):  acetaminophen     Tablet .. 650 milliGRAM(s) Oral every 6 hours PRN Temp greater or equal to 38C (100.4F), Mild Pain (1 - 3)  bisacodyl Suppository 10 milliGRAM(s) Rectal daily PRN Constipation  dextrose Oral Gel 15 Gram(s) Oral once PRN Blood Glucose LESS THAN 70 milliGRAM(s)/deciliter  LORazepam   Injectable 2 milliGRAM(s) IV Push every 4 hours PRN Agitation  ondansetron Injectable 4 milliGRAM(s) IV Push every 8 hours PRN Nausea and/or Vomiting  sodium chloride 0.9% lock flush 10 milliLiter(s) IV Push every 1 hour PRN Pre/post blood products, medications, blood draw, and to maintain line patency         Vitals log        ICU Vital Signs Last 24 Hrs  T(C): 36.7 (10 Nov 2022 02:00), Max: 37.1 (09 Nov 2022 22:38)  T(F): 98 (10 Nov 2022 02:00), Max: 98.8 (09 Nov 2022 22:38)  HR: 68 (10 Nov 2022 05:20) (53 - 85)  BP: 116/62 (10 Nov 2022 04:00) (97/52 - 122/82)  BP(mean): 78 (10 Nov 2022 04:00) (66 - 110)  ABP: --  ABP(mean): --  RR: 25 (10 Nov 2022 04:00) (9 - 38)  SpO2: 100% (10 Nov 2022 05:20) (50% - 100%)    O2 Parameters below as of 09 Nov 2022 19:00  Patient On (Oxygen Delivery Method): ventilator    O2 Concentration (%): 50         Mode: AC/ CMV (Assist Control/ Continuous Mandatory Ventilation)  RR (machine): 16  TV (machine): 400  FiO2: 50  PEEP: 5  ITime: 1  MAP: 15  PIP: 40      Input and Output:  I&O's Detail    08 Nov 2022 07:01  -  09 Nov 2022 07:00  --------------------------------------------------------  IN:    Enteral Tube Flush: 350 mL    IV PiggyBack: 150 mL    IV PiggyBack: 50 mL    IV PiggyBack: 50 mL    IV PiggyBack: 100 mL    Pulmocare: 805 mL  Total IN: 1505 mL    OUT:    Voided (mL): 950 mL  Total OUT: 950 mL    Total NET: 555 mL      09 Nov 2022 07:01  -  10 Nov 2022 06:15  --------------------------------------------------------  IN:    dextrose 5% + sodium chloride 0.45%: 500 mL    Enteral Tube Flush: 100 mL    IV PiggyBack: 200 mL    IV PiggyBack: 500 mL    Pulmocare: 350 mL  Total IN: 1650 mL    OUT:    Voided (mL): 1000 mL  Total OUT: 1000 mL    Total NET: 650 mL          Lab Data                        8.6    4.61  )-----------( 180      ( 09 Nov 2022 05:55 )             29.3     11-09    138  |  103  |  11  ----------------------------<  156<H>  4.3   |  28  |  0.99    Ca    8.0<L>      09 Nov 2022 05:55  Phos  4.4     11-09  Mg     2.3     11-09    TPro  7.1  /  Alb  1.8<L>  /  TBili  0.3  /  DBili  x   /  AST  14  /  ALT  <10<L>  /  AlkPhos  104  11-09            Review of Systems	      Objective     Physical Examination    heart s1s2  lung dec BS      Pertinent Lab findings & Imaging      Annalise:  NO   Adequate UO     I&O's Detail    08 Nov 2022 07:01  -  09 Nov 2022 07:00  --------------------------------------------------------  IN:    Enteral Tube Flush: 350 mL    IV PiggyBack: 150 mL    IV PiggyBack: 50 mL    IV PiggyBack: 50 mL    IV PiggyBack: 100 mL    Pulmocare: 805 mL  Total IN: 1505 mL    OUT:    Voided (mL): 950 mL  Total OUT: 950 mL    Total NET: 555 mL      09 Nov 2022 07:01  -  10 Nov 2022 06:15  --------------------------------------------------------  IN:    dextrose 5% + sodium chloride 0.45%: 500 mL    Enteral Tube Flush: 100 mL    IV PiggyBack: 200 mL    IV PiggyBack: 500 mL    Pulmocare: 350 mL  Total IN: 1650 mL    OUT:    Voided (mL): 1000 mL  Total OUT: 1000 mL    Total NET: 650 mL               Discussed with:     Cultures:	        Radiology

## 2022-11-10 NOTE — PROGRESS NOTE ADULT - SUBJECTIVE AND OBJECTIVE BOX
Patient is a 76y Female with a known history of :  DM foot ulcer [E11.621]      HPI:  76 year old female with PMHx of dementia, COPD with chronic respiratory failure s/p trach, cardiac arrest, CHH, quadriplegia, CVA, CKD, anemia, PEG tube, and multiple hospital admissions for respiratory distress presents to the ED BIBEMS from [facility] complaining of fever, SOB, and dark sputum output from trach. Pt was discharged 2 days ago after being admitted for respiratory distress and pneumonia. Unable to obtain further history secondary to dementia.    WBC 10.9  U/A Moderate leuks    CXR There are persistent advanced bilateral infiltrates a small basilar   effusions right greater than left.  Lungs are rather unchanged compared to October 19 this year.   (04 Nov 2022 18:01)      REVIEW OF SYSTEMS:    CONSTITUTIONAL: No fever, weight loss, or fatigue  EYES: No eye pain, visual disturbances, or discharge  ENMT:  No difficulty hearing, tinnitus, vertigo; No sinus or throat pain  NECK: No pain or stiffness  BREASTS: No pain, masses, or nipple discharge  RESPIRATORY: No cough, wheezing, chills or hemoptysis; No shortness of breath  CARDIOVASCULAR: No chest pain, palpitations, dizziness, or leg swelling  GASTROINTESTINAL: No abdominal or epigastric pain. No nausea, vomiting, or hematemesis; No diarrhea or constipation. No melena or hematochezia.  GENITOURINARY: No dysuria, frequency, hematuria, or incontinence  NEUROLOGICAL: No headaches, memory loss, loss of strength, numbness, or tremors  SKIN: No itching, burning, rashes, or lesions   LYMPH NODES: No enlarged glands  ENDOCRINE: No heat or cold intolerance; No hair loss  MUSCULOSKELETAL: No joint pain or swelling; No muscle, back, or extremity pain  PSYCHIATRIC: No depression, anxiety, mood swings, or difficulty sleeping  HEME/LYMPH: No easy bruising, or bleeding gums  ALLERGY AND IMMUNOLOGIC: No hives or eczema    MEDICATIONS  (STANDING):  cadexomer iodine 0.9% Gel 1 Application(s) Topical <User Schedule>  chlorhexidine 0.12% Liquid 15 milliLiter(s) Oral Mucosa every 12 hours  chlorhexidine 2% Cloths 1 Application(s) Topical daily  chlorhexidine 2% Cloths 1 Application(s) Topical daily  dextrose 5% + sodium chloride 0.45%. 1000 milliLiter(s) (75 mL/Hr) IV Continuous <Continuous>  dextrose 5%. 1000 milliLiter(s) (100 mL/Hr) IV Continuous <Continuous>  dextrose 5%. 1000 milliLiter(s) (50 mL/Hr) IV Continuous <Continuous>  dextrose 50% Injectable 25 Gram(s) IV Push once  dextrose 50% Injectable 12.5 Gram(s) IV Push once  dextrose 50% Injectable 25 Gram(s) IV Push once  doxycycline IVPB      doxycycline IVPB 100 milliGRAM(s) IV Intermittent every 12 hours  glucagon  Injectable 1 milliGRAM(s) IntraMuscular once  heparin   Injectable 5000 Unit(s) SubCutaneous every 12 hours  insulin lispro (ADMELOG) corrective regimen sliding scale   SubCutaneous every 6 hours  lactobacillus acidophilus 1 Tablet(s) Oral daily  LORazepam     Tablet 1 milliGRAM(s) Oral four times a day  midodrine. 10 milliGRAM(s) Oral three times a day  pantoprazole    Tablet 40 milliGRAM(s) Oral before breakfast  polyethylene glycol 3350 17 Gram(s) Oral daily  povidone iodine 10% Solution 1 Application(s) Topical daily  senna 2 Tablet(s) Oral at bedtime  simethicone 80 milliGRAM(s) Chew every 6 hours  sodium chloride 0.9%. 1000 milliLiter(s) (50 mL/Hr) IV Continuous <Continuous>  trimethoprim / sulfamethoxazole IVPB 250 milliGRAM(s) IV Intermittent every 8 hours    MEDICATIONS  (PRN):  acetaminophen     Tablet .. 650 milliGRAM(s) Oral every 6 hours PRN Temp greater or equal to 38C (100.4F), Mild Pain (1 - 3)  bisacodyl Suppository 10 milliGRAM(s) Rectal daily PRN Constipation  dextrose Oral Gel 15 Gram(s) Oral once PRN Blood Glucose LESS THAN 70 milliGRAM(s)/deciliter  LORazepam   Injectable 2 milliGRAM(s) IV Push every 4 hours PRN Agitation  ondansetron Injectable 4 milliGRAM(s) IV Push every 8 hours PRN Nausea and/or Vomiting  sodium chloride 0.9% lock flush 10 milliLiter(s) IV Push every 1 hour PRN Pre/post blood products, medications, blood draw, and to maintain line patency      ALLERGIES: codeine (Hives)      FAMILY HISTORY:  No pertinent family history in first degree relatives        Social history:  Alochol:   Smoking:   Drug Use:   Marital Status:     PHYSICAL EXAMINATION:  -----------------------------  T(C): 36.6 (11-10-22 @ 09:02), Max: 37.1 (11-09-22 @ 22:38)  HR: 72 (11-10-22 @ 09:00) (53 - 75)  BP: 106/63 (11-10-22 @ 09:00) (97/52 - 148/113)  RR: 18 (11-10-22 @ 09:00) (11 - 38)  SpO2: 100% (11-10-22 @ 09:00) (50% - 100%)  Wt(kg): --    11-09 @ 07:01  -  11-10 @ 07:00  --------------------------------------------------------  IN:    dextrose 5% + sodium chloride 0.45%: 600 mL    Enteral Tube Flush: 100 mL    IV PiggyBack: 200 mL    IV PiggyBack: 850 mL    Pulmocare: 350 mL  Total IN: 2100 mL    OUT:    Voided (mL): 1700 mL  Total OUT: 1700 mL    Total NET: 400 mL            Constitutional: well developed, normal appearance, well groomed, well nourished, no deformities and no acute distress.   Eyes: the conjunctiva exhibited no abnormalities and the eyelids demonstrated no xanthelasmas.   HEENT: normal oral mucosa, no oral pallor and no oral cyanosis.   Neck: normal jugular venous A waves present, normal jugular venous V waves present and no jugular venous obregon A waves.   Pulmonary: no respiratory distress, normal respiratory rhythm and effort, no accessory muscle use and lungs were clear to auscultation bilaterally. Anteriorly  Cardiovascular: heart rate and rhythm were normal, normal S1 and S2 and no murmur, gallop, rub, heave or thrill are present.   Musculoskeletal: the gait could not be assessed.   Extremities: no clubbing of the fingernails, no localized cyanosis, no petechial hemorrhages and no ischemic changes.   Skin: normal skin color and pigmentation, no rash, no venous stasis, no skin lesions, no skin ulcer and no xanthoma was observed.       LABS:   --------  11-10    139  |  105  |  9   ----------------------------<  142<H>  4.4   |  29  |  1.07    Ca    7.9<L>      10 Nov 2022 06:19  Phos  4.4     11-09  Mg     2.3     11-09    TPro  7.1  /  Alb  1.8<L>  /  TBili  0.3  /  DBili  x   /  AST  14  /  ALT  <10<L>  /  AlkPhos  104  11-09                         8.7    4.56  )-----------( 164      ( 10 Nov 2022 06:19 )             29.8                   Radiology:

## 2022-11-10 NOTE — DISCHARGE NOTE PROVIDER - NPI NUMBER (FOR SYSADMIN USE ONLY) :
----- Message from Rayshawn Luther MD sent at 3/25/2021  1:29 PM CDT -----  Can someone let Mr Louis know that I looked for some retina specialist close to a [place].  The closest I could find is unfortunately in [place]. This is the website of 1 of the groups:    [email or web address]    Phone: [phone]  ----- Message -----  From: Rayshawn Luther MD  Sent: 3/16/2021   3:57 PM CDT  To: Rayshawn Luther MD    [place]      
I called [facility] and relayed Dr Luther message regarding locating a retina specialist near where there are staying in [place]. He expressed understanding.   
[[phone]]

## 2022-11-10 NOTE — PROVIDER CONTACT NOTE (EICU) - SITUATION
e-Alerted by bedside staff requesting order for CXR s/p LIJ CVC placement.
pt being transported back to Nursing Facility. known spastic contractions. was given prn lorazepam 2hrs ago, requesting another dose. chronic trach on ventilator.
Bedside team concerned that patient appears uncomfortable. Mildly tachypneic.   Received standing and PRN ativan earlier today.

## 2022-11-10 NOTE — PROVIDER CONTACT NOTE (EICU) - ACTION/TREATMENT ORDERED:
Fentanyl 50mcg IVP x 1 ordered. Further plan/management as per primary team.
Immediate CXR order placed in EMR. Results to be followed up by bedside provider.

## 2022-11-10 NOTE — PROGRESS NOTE ADULT - ASSESSMENT
The patient is a 76 year old female with a history of HTN, DM, CVA, dementia, chronic respiratory failure s/p trach, PEG, cardiac arrest, anemia, pleural effusions who presents with fever and respiratory distress.    11/10/22  Seen at [facility] ICU  Eyes open, gazing    Plan:  - ECG with no evidence of ischemia or infarction  - Echo 8/22 with normal LV systolic function, mod pulm HTN, IVC small/collapsible  - CT chest with small bilateral pleural effusions, GGO, and infiltrates  - Pleural effusions previously were exudative, likely parapneumonic  - Continue midodrine 10 mg tid  - IV antibiotics-doxycycline, bactrim  - Being followed by ID, Pulmonary, Palliative care

## 2022-11-10 NOTE — PROGRESS NOTE ADULT - REASON FOR ADMISSION
diabetic ulcerations hallux bilaterally rule out OM
pneumonia
pneumonia
diabetic ulcerations hallux bilaterally rule out OM

## 2022-11-10 NOTE — DISCHARGE NOTE PROVIDER - CARE PROVIDER_API CALL
Andressa Brantley)  Internal Medicine  [address] [place] [address], [facility] [address]  [place] [address]  Phone: [phone]  Fax: [phone]  Follow Up Time: 1 week

## 2022-11-10 NOTE — PROGRESS NOTE ADULT - ASSESSMENT
REVIEW OF SYMPTOMS      Able to give (reliable) ROS  NO     PHYSICAL EXAM    HEENT Unremarkable  atraumatic   RESP Fair air entry EXP prolonged    Harsh breath sound Resp distres mild   CARDIAC S1 S2 No S3     NO JVD    ABDOMEN SOFT BS PRESENT NOT DISTENDED No hepatosplenomegaly   PEDAL EDEMA present No calf tenderness  NO rash     GENERAL DATA .   GOC.        ALLGY. codeine                           WT.   BMI.                              ICU STAY. 11/4/2022  COVID.  11/4 scv2 (-)     BEST PRACTICE ISSUES.    HOB ELEVATN. Yes  DVT PPLX.  11/4/2022 hpsc    SQUIRES PPLX.  11/4/2022 protonix 40     INFN PPLX.    11/5/2022 chlorhexidine 2%   11/5/2022 chlorhexidine .12%   SP SW EM.        DIET. 11/5/2022 jevity 1.5 30/h gt    VS/ PO/IO/ VENT/ DRIPS.  11/10/2022 afeb 60 100/60  11/10/2022 ac 16/400/5/.    OVERALL ASSESSMENT/DISPOSITION.  76 f PMH trach peg cva cac anoxic encephalo PH 8/19/2022 pasp 58 bl pl effs  r thora 6/8/2022 and l thora 5/25/2022 were lp exudates  recurrent hospitalizations HO CR psuedomonas 5/2/2022 zosyn 8/19/2022  recent admission 10/18-11/2/2022 uc 10/18 100K E coli  sp 10/19 Stenotrophomonas  10/19-10/26/2022 levaquin 750  10/21/2022 rocephin x 7d Dr BRITTANY Brantley sent back from [facility] 11/4/2022 with fever   Pulm crit care consulted 11/4/2022    .. VAP   w 11/4-11/5-11/7/2022 w 10.9 - 9.1 - 6.8   pr 11/6/2022 .6   ua 11/4/2022 w 11-25   cxr 11/4/2022 persistent advanced bl infiltr cw 11/4/2022 small basl effesns r gr  than l   rvp 11/4 (-)   trach 11/5 stenotrophomonas   uc 11/4 vr enterococc 50-99 candida   bc 11/4 (-)   11/4/2022 levaquin 750 dced  11/7 doxy   11/8 bactrim 250.3     .. Resp distress  11/4 v duplx (-)   On vent fio2 40       TIME SPENT   Over 39 minutes aggregate critical care time spent on encounter; activities included   direct patient care, counseling and/or coordinating care reviewing notes, lab data/ imaging , discussion with multidisciplinary team/ patient  /family and explaining in detail risks, benefits, alternatives  of the recommendations     CHAPINCITO GUIDRY 76 f [facility] S 11/4/2022   DR HIMANSHU RANDHAWA

## 2022-11-10 NOTE — PROGRESS NOTE ADULT - ASSESSMENT
76 year old female with PMHx of dementia, COPD with chronic respiratory failure s/p trach, cardiac arrest, CHH, quadriplegia, CVA, CKD, anemia, PEG tube, and multiple hospital admissions for respiratory distress a/w sepsis and acute on chronic hypoxic respiratory failure due to suspected recurrent VAP.    Severe sepsis and acute hypoxic respiratory failure 2/2 gram-negative PNA   SPCU monitoring  - ID ([facility]), recs appreciated   care d/w Dr. Brantley in ID, pt currently recommended to be on bactrim and doxycycline  - c/w midodrine with hold parameters -- consider titrating dose if BP elevated  - cc/pulm consult (Jaime), recs appreciated  - palliative care (Emre), recs appreciated - pt is full code  f/u cultures  titrate Fio2  - pt has midline placed, IJ pulled today    PEG tube dislodged  - last night, peg tube came out  - GI Coral was consulted and care d/w him, he recommended inserting a vaughn and inflating balloon to keep tract open  - will f/u further gI recs    Gangrenous toes  podiatry consulted  xrays pending    Constipation  dulcolax suppository ordered    Diastolic CHF  Hx of Pleural effusions  - last TTE was 5/30 with EF 65% with normal LVSF, dilated RV, and moderate pHTN -> repeat TTE appears unchanged  - may need repeat thoracentesis (had 1.5L drained few admissions ago), pulmonology consulted; pl effusion was parapneumonic on past admission as opposed to transudative and thus less likely related to CHF  - on vent    Anemia of chronic disease  - has been evaluated by GI and hematology in the past -> dx with anemia of chronic disease with intermittent GI bleeding  - no jacqueline GI bleed per nursing     hx of HTN  - not on any anti-hypertensive meds at facility  - monitor VS    DM2  - NPO with TF  - c/w moderate dose lispro coverage sliding scale q6h  - goal -180 - now at goal - c/w lantus 5un sq QAM      76 year old female with PMHx of dementia, COPD with chronic respiratory failure s/p trach, cardiac arrest, CHH, quadriplegia, CVA, CKD, anemia, PEG tube, and multiple hospital admissions for respiratory distress a/w sepsis and acute on chronic hypoxic respiratory failure due to suspected recurrent VAP.    Severe sepsis and acute hypoxic respiratory failure 2/2 gram-negative PNA   SPCU monitoring  - ID ([facility]), recs appreciated   care d/w Dr. Brantley in ID, pt currently recommended to be on bactrim and doxycycline  - c/w midodrine with hold parameters -- consider titrating dose if BP elevated  - cc/pulm consult (Jaime), recs appreciated  - palliative care (Emre), recs appreciated - pt is full code  f/u cultures  titrate Fio2  - pt has midline placed, IJ pulled today    PEG tube dislodged  - last night, peg tube came out  - GI Sideridis was consulted, this am peg tube replaced    Gangrenous toes  podiatry consulted  xrays pending    Constipation  dulcolax suppository ordered    Diastolic CHF  Hx of Pleural effusions  - last TTE was 5/30 with EF 65% with normal LVSF, dilated RV, and moderate pHTN -> repeat TTE appears unchanged  - may need repeat thoracentesis (had 1.5L drained few admissions ago), pulmonology consulted; pl effusion was parapneumonic on past admission as opposed to transudative and thus less likely related to CHF  - on vent    Anemia of chronic disease  - has been evaluated by GI and hematology in the past -> dx with anemia of chronic disease with intermittent GI bleeding  - no jacqueline GI bleed per nursing     hx of HTN  - not on any anti-hypertensive meds at facility  - monitor VS    DM2  - NPO with TF  - c/w moderate dose lispro coverage sliding scale q6h  - goal -180 - now at goal - c/w lantus 5un sq QAM

## 2022-11-10 NOTE — CONSULT NOTE ADULT - PROVIDER SPECIALTY LIST ADULT
Critical Care
Wound Care
Critical Care
Gastroenterology
Cardiology
Infectious Disease
Podiatry
Palliative Care

## 2022-11-10 NOTE — CONSULT NOTE ADULT - CONSULT REASON
GOC  chr resp failure
ulcerations distal aspect hallux bilaterally
Acute on chronic respiratory failure
Fever, sepsis
pneu
UTI v PNA
Wound Consult
displaced peg

## 2022-11-10 NOTE — PROVIDER CONTACT NOTE (EICU) - BACKGROUND
Medication ordered as per bedside nursing request after discussion with primary ICU team who is unable to place this order currently due to emergent patient needs. This test will be reviewed and addressed by the primary team as per local ICU protocols/guideline. Patient identification confirmed with- Name, , and MRN- confirmation facilitated by bedside clinical team if the patient is unable to self-identify.
76 year old female with PMHx of dementia, COPD with chronic respiratory failure s/p trach, cardiac arrest, CHH, quadriplegia, CVA, CKD, anemia, PEG tube, and multiple hospital admissions for respiratory distress. Admitted with sepsis and acute on chronic hypoxic respiratory failure due to suspected recurrent VAP.

## 2022-11-10 NOTE — CONSULT NOTE ADULT - SUBJECTIVE AND OBJECTIVE BOX
Chief Complaint:  Patient is a 76y old  Female who presents with a chief complaint of pneumonia (10 Nov 2022 08:23)    Dementia of frontal lobe type    Aphasic stroke    Diabetes mellitus    Respiratory failure    Hypertension    GERD (gastroesophageal reflux disease)    Constipation    Respiratory failure    CVA (cerebral vascular accident)    HTN (hypertension)    DM (diabetes mellitus)    Advanced dementia    COVID-19 virus detected    Quadriplegia    Pneumonia    Type II diabetes mellitus    Hx of appendectomy    Gastrostomy in place    Tracheostomy in place    Tracheostomy tube present    Feeding by G-tube       HPI:  76 year old female with PMHx of dementia, COPD with chronic respiratory failure s/p trach, cardiac arrest, CHH, quadriplegia, CVA, CKD, anemia, PEG tube, and multiple hospital admissions for respiratory distress presents to the ED BIBEMS from [facility] complaining of fever, SOB, and dark sputum output from trach. Pt was discharged 2 days ago after being admitted for respiratory distress and pneumonia. Unable to obtain further history secondary to dementia.    WBC 10.9  U/A Moderate leuks    CXR There are persistent advanced bilateral infiltrates a small basilar   effusions right greater than left.  Lungs are rather unchanged compared to  this year.   (2022 18:01)      codeine (Hives)      acetaminophen     Tablet .. 650 milliGRAM(s) Oral every 6 hours PRN  bisacodyl Suppository 10 milliGRAM(s) Rectal daily PRN  cadexomer iodine 0.9% Gel 1 Application(s) Topical <User Schedule>  chlorhexidine 0.12% Liquid 15 milliLiter(s) Oral Mucosa every 12 hours  chlorhexidine 2% Cloths 1 Application(s) Topical daily  chlorhexidine 2% Cloths 1 Application(s) Topical daily  dextrose 5% + sodium chloride 0.45%. 1000 milliLiter(s) IV Continuous <Continuous>  dextrose 5%. 1000 milliLiter(s) IV Continuous <Continuous>  dextrose 5%. 1000 milliLiter(s) IV Continuous <Continuous>  dextrose 50% Injectable 25 Gram(s) IV Push once  dextrose 50% Injectable 12.5 Gram(s) IV Push once  dextrose 50% Injectable 25 Gram(s) IV Push once  dextrose Oral Gel 15 Gram(s) Oral once PRN  doxycycline IVPB      doxycycline IVPB 100 milliGRAM(s) IV Intermittent every 12 hours  glucagon  Injectable 1 milliGRAM(s) IntraMuscular once  heparin   Injectable 5000 Unit(s) SubCutaneous every 12 hours  insulin lispro (ADMELOG) corrective regimen sliding scale   SubCutaneous every 6 hours  lactobacillus acidophilus 1 Tablet(s) Oral daily  LORazepam     Tablet 1 milliGRAM(s) Oral four times a day  LORazepam   Injectable 2 milliGRAM(s) IV Push every 4 hours PRN  midodrine. 10 milliGRAM(s) Oral three times a day  ondansetron Injectable 4 milliGRAM(s) IV Push every 8 hours PRN  pantoprazole    Tablet 40 milliGRAM(s) Oral before breakfast  polyethylene glycol 3350 17 Gram(s) Oral daily  povidone iodine 10% Solution 1 Application(s) Topical daily  senna 2 Tablet(s) Oral at bedtime  simethicone 80 milliGRAM(s) Chew every 6 hours  sodium chloride 0.9% lock flush 10 milliLiter(s) IV Push every 1 hour PRN  sodium chloride 0.9%. 1000 milliLiter(s) IV Continuous <Continuous>  trimethoprim / sulfamethoxazole IVPB 250 milliGRAM(s) IV Intermittent every 8 hours        FAMILY HISTORY:  No pertinent family history in first degree relatives          Review of Systems:    General:  No wt loss, fevers, chills, night sweats,fatigue,   Eyes:  Good vision, no reported pain  ENT:  No sore throat, pain, runny nose, dysphagia  CV:  No pain, palpitatioins, hypo/hypertension  Resp:  No dyspnea, cough, tachypnea, wheezing  :  No pain, bleeding, incontinence, nocturia  Muscle:  No pain, weakness  Neuro:  No weakness, tingling, memory problems  Psych:  No fatigue, insomnia, mood problems, depression  Endocrine:  No polyuria, polydypsia, cold/heat intolerance  Heme:  No petechiae, ecchymosis, easy bruisability  Skin:  No rash, tattoos, scars, edema    Relevant Family History:       Relevant Social History:       Physical Exam:    Vital Signs:  Vital Signs Last 24 Hrs  T(C): 36.6 (10 Nov 2022 09:02), Max: 37.1 (2022 22:38)  T(F): 97.9 (10 Nov 2022 09:02), Max: 98.8 (2022 22:38)  HR: 60 (10 Nov 2022 10:39) (53 - 75)  BP: 106/63 (10 Nov 2022 09:00) (97/52 - 148/113)  BP(mean): 76 (10 Nov 2022 09:00) (66 - 126)  RR: 18 (10 Nov 2022 09:00) (11 - 38)  SpO2: 100% (10 Nov 2022 10:39) (50% - 100%)    Parameters below as of 10 Nov 2022 09:00  Patient On (Oxygen Delivery Method): ventilator    O2 Concentration (%): 50  Daily     Daily Weight in k.9 (10 Nov 2022 06:00)    General:  Appears stated age, well-groomed, well-nourished, no distress  HEENT:  NC/AT,  conjunctivae clear and pink, no thyromegaly, nodules, adenopathy, no JVD  Chest:  Full & symmetric excursion, no increased effort, breath sounds clear  Cardiovascular:  Regular rhythm, S1, S2, no murmur/rub/S3/S4, no abdominal bruit, no edema  Abdomen:  Soft, non-tender, non-distended, normoactive bowel sounds,  no masses ,no hepatosplenomeagaly, no signs of chronic liver disease  Extremities:  no cyanosis,clubbing or edema  Skin:  No rash/erythema/ecchymoses/petechiae/wounds/abscess/warm/dry  Neuro/Psych:  Alert, oriented, no asterixis, no tremor, no encephalopathy    Laboratory:                            8.7    4.56  )-----------( 164      ( 10 Nov 2022 06:19 )             29.8     1110    139  |  105  |  9   ----------------------------<  142<H>  4.4   |  29  |  1.07    Ca    7.9<L>      10 Nov 2022 06:19  Phos  4.4       Mg     2.3         TPro  7.1  /  Alb  1.8<L>  /  TBili  0.3  /  DBili  x   /  AST  14  /  ALT  <10<L>  /  AlkPhos  104      LIVER FUNCTIONS - ( 2022 05:55 )  Alb: 1.8 g/dL / Pro: 7.1 g/dL / ALK PHOS: 104 U/L / ALT: <10 U/L DA / AST: 14 U/L / GGT: x                 Imaging:

## 2022-11-10 NOTE — PROGRESS NOTE ADULT - ASSESSMENT
76 year old female with PMHx of dementia, COPD with chronic respiratory failure s/p trach, cardiac arrest, CHH, quadriplegia, CVA, CKD, anemia, PEG tube, and multiple hospital admissions for respiratory distress presents to the ED BIBEMS from [facility] complaining of fever,    sepsis  chr resp failure  dementia  copd  atelectasis  dysphagia  peg  trach  cva  ckd  anemia    GOC documented  pt is full code   Marciano - HCP    TLC change noted  on TF  ABX adjustment noted - ID follow up - cx noted - biomarkers -   on IVF

## 2022-11-10 NOTE — DISCHARGE NOTE NURSING/CASE MANAGEMENT/SOCIAL WORK - NSDCVIVACCINE_GEN_ALL_CORE_FT
COVID-19, mRNA, LNP-S, PF, 30 mcg/0.3 mL dose ([employer]); 20-Dec-2021 16:02; Cat Ramirez); [employer]; [id number] (Exp. Date: 30-Dec-2021); IntraMuscular; Deltoid Left.; 0.3 milliLiter(s);

## 2022-11-10 NOTE — PROVIDER CONTACT NOTE (EICU) - RECOMMENDATIONS
Chronic trach, has decub, ?pain
AMA (saw a physician/midlevel provider and clinician was able to provide reasons for staying for treatment & form is signed)

## 2022-11-10 NOTE — DISCHARGE NOTE PROVIDER - NSDCMRMEDTOKEN_GEN_ALL_CORE_FT
albuterol 90 mcg/inh inhalation aerosol with adapter: 2  inhaled every 6 hours  Bacid (LAC) oral tablet: 2 tab(s) by gastrostomy tube once a day  Betadine 10% topical swab: cleanse right and left great toes  chlorhexidine 0.12% mucous membrane liquid: 15 milliliter(s) mucous membrane 2 times a day  Eucerin topical cream: Apply topically to affected area once a day bilateral feet  insulin glargine 100 units/mL subcutaneous solution: 8 unit(s) subcutaneous once a day (in the morning)  ipratropium-albuterol 0.5 mg-2.5 mg/3 mL inhalation solution: 3 milliliter(s) inhaled 4 times a day  LORazepam 1 mg oral tablet: 1 tab(s) by gastrostomy tube every 4 hours  midodrine 10 mg oral tablet: 1 tab(s) orally every 8 hours  MiraLax oral powder for reconstitution: g-tube  Multiple Vitamins oral tablet: 1 tab(s) orally once a day  nystatin 100,000 units/g topical powder: 1 application topically 3 times a day  omeprazole 20 mg oral delayed release capsule: orally 2 times a day  polyethylene glycol 3350 oral powder for reconstitution: 17 gram(s) by gastrostomy tube every 12 hours  senna 8.6 mg oral tablet: 3 tab(s) by gastrostomy tube once a day (at bedtime)  simethicone 80 mg oral tablet, chewable: 1 tab(s) by gastrostomy tube every 6 hours  sucralfate 1 g/10 mL oral suspension: 10 milliliter(s) g-tube 4 times a day (before meals and at bedtime)   albuterol 90 mcg/inh inhalation aerosol with adapter: 2  inhaled every 6 hours  Bacid (LAC) oral tablet: 2 tab(s) by gastrostomy tube once a day  Betadine 10% topical swab: cleanse right and left great toes  chlorhexidine 0.12% mucous membrane liquid: 15 milliliter(s) mucous membrane 2 times a day  doxycycline 100 mg injection: 1 dose(s) injectable 2 times a day  via midline  Eucerin topical cream: Apply topically to affected area once a day bilateral feet  insulin glargine 100 units/mL subcutaneous solution: 8 unit(s) subcutaneous once a day (in the morning)  ipratropium-albuterol 0.5 mg-2.5 mg/3 mL inhalation solution: 3 milliliter(s) inhaled 4 times a day  LORazepam 1 mg oral tablet: 1 tab(s) by gastrostomy tube every 4 hours  midodrine 10 mg oral tablet: 1 tab(s) orally every 8 hours  Multiple Vitamins oral tablet: 1 tab(s) orally once a day  nystatin 100,000 units/g topical powder: 1 application topically 3 times a day  omeprazole 20 mg oral delayed release capsule: orally 2 times a day  polyethylene glycol 3350 oral powder for reconstitution: 17 gram(s) by gastrostomy tube every 12 hours  senna 8.6 mg oral tablet: 3 tab(s) by gastrostomy tube once a day (at bedtime)  simethicone 80 mg oral tablet, chewable: 1 tab(s) by gastrostomy tube every 6 hours  sucralfate 1 g/10 mL oral suspension: 10 milliliter(s) g-tube 4 times a day (before meals and at bedtime)  sulfamethoxazole-trimethoprim 80 mg-16 mg/mL intravenous solution: 1 dose(s) intravenous 3 times a day  (dose is 250mg based on trimethoprim concentrate (=15.625cc per dose) in 250 cc of Dextrose 5%) via midline   albuterol 90 mcg/inh inhalation aerosol with adapter: 2  inhaled every 6 hours  Bacid (LAC) oral tablet: 2 tab(s) by gastrostomy tube once a day  Betadine 10% topical swab: cleanse right and left great toes  chlorhexidine 0.12% mucous membrane liquid: 15 milliliter(s) mucous membrane 2 times a day  doxycycline 100 mg injection: 1 dose(s) injectable 2 times a day  via midline through November 2nd  Eucerin topical cream: Apply topically to affected area once a day bilateral feet  insulin glargine 100 units/mL subcutaneous solution: 8 unit(s) subcutaneous once a day (in the morning)  ipratropium-albuterol 0.5 mg-2.5 mg/3 mL inhalation solution: 3 milliliter(s) inhaled 4 times a day  LORazepam 1 mg oral tablet: 1 tab(s) by gastrostomy tube every 4 hours  midodrine 10 mg oral tablet: 1 tab(s) orally every 8 hours  Multiple Vitamins oral tablet: 1 tab(s) orally once a day  nystatin 100,000 units/g topical powder: 1 application topically 3 times a day  omeprazole 20 mg oral delayed release capsule: orally 2 times a day  polyethylene glycol 3350 oral powder for reconstitution: 17 gram(s) by gastrostomy tube every 12 hours  senna 8.6 mg oral tablet: 3 tab(s) by gastrostomy tube once a day (at bedtime)  simethicone 80 mg oral tablet, chewable: 1 tab(s) by gastrostomy tube every 6 hours  sucralfate 1 g/10 mL oral suspension: 10 milliliter(s) g-tube 4 times a day (before meals and at bedtime)  sulfamethoxazole-trimethoprim 80 mg-16 mg/mL intravenous solution: 1 dose(s) intravenous 3 times a day  (dose is 250mg based on trimethoprim concentrate (=15.625cc per dose) in 250 cc of Dextrose 5%) via midline  through Noveber 2nd

## 2022-11-10 NOTE — PROGRESS NOTE ADULT - SUBJECTIVE AND OBJECTIVE BOX
[facility], Division of Infectious Diseases  MOIZ Lora Y. Patel, S. Shah, [facility]  [phone]    Name: BREA BECKHAM  Age: 76y  Gender: Female  MRN: [id number]    Interval History:  Patient seen and examined at bedside  No acute overnight events. Afebrile  Notes reviewed    Antibiotics:  doxycycline IVPB      doxycycline IVPB 100 milliGRAM(s) IV Intermittent every 12 hours  trimethoprim / sulfamethoxazole IVPB 250 milliGRAM(s) IV Intermittent every 8 hours      Medications:  acetaminophen     Tablet .. 650 milliGRAM(s) Oral every 6 hours PRN  bisacodyl Suppository 10 milliGRAM(s) Rectal daily PRN  cadexomer iodine 0.9% Gel 1 Application(s) Topical <User Schedule>  chlorhexidine 0.12% Liquid 15 milliLiter(s) Oral Mucosa every 12 hours  chlorhexidine 2% Cloths 1 Application(s) Topical daily  chlorhexidine 2% Cloths 1 Application(s) Topical daily  dextrose 5% + sodium chloride 0.45%. 1000 milliLiter(s) IV Continuous <Continuous>  dextrose 5%. 1000 milliLiter(s) IV Continuous <Continuous>  dextrose 5%. 1000 milliLiter(s) IV Continuous <Continuous>  dextrose 50% Injectable 25 Gram(s) IV Push once  dextrose 50% Injectable 12.5 Gram(s) IV Push once  dextrose 50% Injectable 25 Gram(s) IV Push once  dextrose Oral Gel 15 Gram(s) Oral once PRN  doxycycline IVPB      doxycycline IVPB 100 milliGRAM(s) IV Intermittent every 12 hours  glucagon  Injectable 1 milliGRAM(s) IntraMuscular once  heparin   Injectable 5000 Unit(s) SubCutaneous every 12 hours  insulin lispro (ADMELOG) corrective regimen sliding scale   SubCutaneous every 6 hours  lactobacillus acidophilus 1 Tablet(s) Oral daily  LORazepam     Tablet 1 milliGRAM(s) Oral four times a day  LORazepam   Injectable 2 milliGRAM(s) IV Push every 4 hours PRN  midodrine. 10 milliGRAM(s) Oral three times a day  ondansetron Injectable 4 milliGRAM(s) IV Push every 8 hours PRN  pantoprazole    Tablet 40 milliGRAM(s) Oral before breakfast  polyethylene glycol 3350 17 Gram(s) Oral daily  povidone iodine 10% Solution 1 Application(s) Topical daily  senna 2 Tablet(s) Oral at bedtime  simethicone 80 milliGRAM(s) Chew every 6 hours  sodium chloride 0.9% lock flush 10 milliLiter(s) IV Push every 1 hour PRN  sodium chloride 0.9%. 1000 milliLiter(s) IV Continuous <Continuous>  trimethoprim / sulfamethoxazole IVPB 250 milliGRAM(s) IV Intermittent every 8 hours      Review of Systems:  unable to obtain    Allergies: codeine (Hives)    For details regarding the patient's past medical history, social history, family history, and other miscellaneous elements, please refer the initial infectious diseases consultation and/or the admitting history and physical examination for this admission.    Objective:  Vitals:   T(C): 36.6 (11-10-22 @ 09:02), Max: 37.1 (11-09-22 @ 22:38)  HR: 56 (11-10-22 @ 12:00) (53 - 75)  BP: 99/54 (11-10-22 @ 12:00) (92/64 - 148/113)  RR: 16 (11-10-22 @ 12:00) (11 - 38)  SpO2: 100% (11-10-22 @ 11:00) (50% - 100%)    Physical Examination:  General: chronically ill appearing   Neck: trach  HEENT: NC/AT  Lungs: MV breath sounds  Heart: Regular rate and rhythm. No murmur, rub or gallop.  Abdomen: Soft. Nondistended. Nontender. PGT in place  Skin: Warm. Dry. Good turgor    Laboratory Studies:  CBC:                       8.7    4.56  )-----------( 164      ( 10 Nov 2022 06:19 )             29.8     CMP: 11-10    139  |  105  |  9   ----------------------------<  142<H>  4.4   |  29  |  1.07    Ca    7.9<L>      10 Nov 2022 06:19  Phos  4.4     11-09  Mg     2.3     11-09    TPro  7.1  /  Alb  1.8<L>  /  TBili  0.3  /  DBili  x   /  AST  14  /  ALT  <10<L>  /  AlkPhos  104  11-09    LIVER FUNCTIONS - ( 09 Nov 2022 05:55 )  Alb: 1.8 g/dL / Pro: 7.1 g/dL / ALK PHOS: 104 U/L / ALT: <10 U/L DA / AST: 14 U/L / GGT: x               Microbiology: reviewed    Culture - Abscess with Gram Stain (collected 11-07-22 @ 23:56)  Source: .Abscess foot  Preliminary Report (11-09-22 @ 15:54):    Numerous Acinetobacter baumannii/nosocomialis group    Few Yeast    Culture - Sputum (collected 11-05-22 @ 06:39)  Source: Trach Asp Tracheal Aspirate  Gram Stain (11-05-22 @ 14:45):    Few Squamous epithelial cells per low power field    Few polymorphonuclear leukocytes per low power field    Rare Gram Positive Rods per oil power field    Rare Gram Negative Rods per oil power field  Final Report (11-09-22 @ 14:54):    Numerous Stenotrophomonas maltophilia    Moderate Serratia marcescens    Normal Respiratory Elvira present  Organism: Stenotrophomonas maltophilia  Serratia marcescens (11-09-22 @ 14:54)  Organism: Serratia marcescens (11-09-22 @ 14:54)      -  Amikacin: S <=16      -  Amoxicillin/Clavulanic Acid: R >16/8      -  Ampicillin: R >16 These ampicillin results predict results for amoxicillin      -  Ampicillin/Sulbactam: R >16/8 Enterobacter, Klebsiella aerogenes, Citrobacter, and Serratia may develop resistance during prolonged therapy (3-4 days)      -  Aztreonam: S <=4      -  Cefazolin: R >16 Enterobacter, Klebsiella aerogenes, Citrobacter, and Serratia may develop resistance during prolonged therapy (3-4 days)      -  Cefepime: S <=2      -  Cefoxitin: R >16      -  Ceftriaxone: R 8 Enterobacter, Klebsiella aerogenes, Citrobacter, and Serratia may develop resistance during prolonged therapy      -  Ciprofloxacin: R >2      -  Ertapenem: S <=0.5      -  Gentamicin: S <=2      -  Levofloxacin: R 2      -  Meropenem: S <=1      -  Piperacillin/Tazobactam: R 32      -  Tobramycin: S <=2      -  Trimethoprim/Sulfamethoxazole: S <=0.5/9.5      Method Type: PRATIK  Organism: Stenotrophomonas maltophilia (11-09-22 @ 14:54)      -  Minocycline: [id number]      Method Type: KB  Organism: Stenotrophomonas maltophilia (11-09-22 @ 14:54)      -  Levofloxacin: I 4      -  Trimethoprim/Sulfamethoxazole: S <=0.5/9.5      Method Type: PRATIK    Culture - Urine (collected 11-04-22 @ 13:54)  Source: Clean Catch Clean Catch (Midstream)  Final Report (11-07-22 @ 10:15):    >100,000 CFU/ml Enterococcus faecium (vancomycin resistant)    50,000 - 99,000 CFU/mL Candida albicans "Susceptibilities not performed"  Organism: Enterococcus faecium (vancomycin resistant) (11-07-22 @ 10:15)  Organism: Enterococcus faecium (vancomycin resistant) (11-07-22 @ 10:15)      -  Ampicillin: R >8 Predicts results to ampicillin/sulbactam, amoxacillin-clavulanate and  piperacillin-tazobactam.      -  Ciprofloxacin: R >2      -  Daptomycin: SDD 4 The breakpoint for SDD (sensitive dose dependent)is based on a dosage regimen of 8-12 mg/kg administered every 24 h in adults and is intended for serious infections due to E. faecium. Consultation with an infectious diseases specialist is recommended.      -  Levofloxacin: R >4      -  Linezolid: S 2      -  Nitrofurantoin: R >64 Should not be used to treat pyelonephritis.      -  Tetra/Doxy: S <=4      -  Vancomycin: R >16      Method Type: PRATIK    Culture - Blood (collected 11-04-22 @ 13:52)  Source: .Blood Blood-Peripheral  Final Report (11-09-22 @ 20:01):    No Growth Final    Culture - Blood (collected 11-04-22 @ 13:52)  Source: .Blood Blood-Peripheral  Final Report (11-09-22 @ 20:01):    No Growth Final          Radiology: reviewed

## 2022-11-10 NOTE — DISCHARGE NOTE PROVIDER - HOSPITAL COURSE
HPI:  76 year old female with PMHx of dementia, COPD with chronic respiratory failure s/p trach, cardiac arrest, CHH, quadriplegia, CVA, CKD, anemia, PEG tube, and multiple hospital admissions for respiratory distress presents to the ED [facility] from [facility] complaining of fever, SOB, and dark sputum output from trach. Pt was discharged 2 days ago after being admitted for respiratory distress and pneumonia. Unable to obtain further history secondary to dementia.    WBC 10.9  U/A Moderate leuks    CXR There are persistent advanced bilateral infiltrates a small basilar   effusions right greater than left.  Lungs are rather unchanged compared to October 19 this year.   (04 Nov 2022 18:01)      HOSPITAL COURSE:    Patient p/w fever and sob, suspected 2/2 ventilator associated pneumonia, patient was cared for in SPCU here, and followed by ID.  Patient was initially treated with vanco, zosyn, and as per ID changed to doxycycline and bactrim, which she was to continue in the outpatient setting.  Patient's  was concerned about possible oseto in foot, but xray was negative for any suggestion of osteo, and was advised to follow up with vascular and ID in outpatient setting.  Patient's peg tube dislodged 11/9 at night, but was placed back in 11/10 am by GI.  Patient was afebrile, with no leukocytosis on day of discharge, and was planned ot go back to nursing facility.     REVIEW OF SYSTEMS:    vented, unable to elicit.    VITAL SIGNS:    Vital Signs Last 24 Hrs  T(C): 36.6 (10 Nov 2022 09:02), Max: 37.1 (09 Nov 2022 22:38)  T(F): 97.9 (10 Nov 2022 09:02), Max: 98.8 (09 Nov 2022 22:38)  HR: 60 (10 Nov 2022 10:39) (53 - 75)  BP: 106/63 (10 Nov 2022 09:00) (97/52 - 148/113)  BP(mean): 76 (10 Nov 2022 09:00) (66 - 126)  RR: 18 (10 Nov 2022 09:00) (11 - 38)  SpO2: 100% (10 Nov 2022 10:39) (50% - 100%)    Parameters below as of 10 Nov 2022 09:00  Patient On (Oxygen Delivery Method): ventilator    O2 Concentration (%): 50    PHYSICAL EXAM:     GENERAL: vented  HEENT: EOMI, neck supple, MMM  RESPIRATORY: LCTAB/L, no rhonchi, rales, or wheezing  CARDIOVASCULAR: RRR, no murmurs, gallops, rubs  ABDOMINAL: soft, non-tender, non-distended, positive bowel sounds ; + peg tube  EXTREMITIES: no clubbing, cyanosis, or edema  NEUROLOGICAL: vented, opens eyes  SKIN: no rashes or lesions   MUSCULOSKELETAL: no gross joint deformity                          8.7    4.56  )-----------( 164      ( 10 Nov 2022 06:19 )             29.8     11-10    139  |  105  |  9   ----------------------------<  142<H>  4.4   |  29  |  1.07    Ca    7.9<L>      10 Nov 2022 06:19  Phos  4.4     11-09  Mg     2.3     11-09    TPro  7.1  /  Alb  1.8<L>  /  TBili  0.3  /  DBili  x   /  AST  14  /  ALT  <10<L>  /  AlkPhos  104  11-09

## 2022-11-10 NOTE — DISCHARGE NOTE NURSING/CASE MANAGEMENT/SOCIAL WORK - PATIENT PORTAL LINK FT
You can access the FollowMyHealth Patient Portal offered by [facility] by registering at the following website: [email or web address]. By joining [employer]’s FollowMyHealth portal, you will also be able to view your health information using other applications (apps) compatible with our system.

## 2022-11-10 NOTE — PROGRESS NOTE ADULT - SUBJECTIVE AND OBJECTIVE BOX
BREA BECKHAM     [facility]    Allergies    codeine (Hives)    Intolerances        PAST MEDICAL & SURGICAL HISTORY:  Dementia of frontal lobe type      Aphasic stroke      Diabetes mellitus      Respiratory failure      Hypertension      GERD (gastroesophageal reflux disease)      Constipation      Respiratory failure      CVA (cerebral vascular accident)      HTN (hypertension)      DM (diabetes mellitus)      Advanced dementia      COVID-19 virus detected      Quadriplegia      Pneumonia      Type II diabetes mellitus      Hx of appendectomy      Gastrostomy in place      Tracheostomy in place      Tracheostomy tube present      Feeding by G-tube          FAMILY HISTORY:  No pertinent family history in first degree relatives        Home Medications:  albuterol 90 mcg/inh inhalation aerosol with adapter: 2  inhaled every 6 hours (18 Oct 2022 11:42)  Bacid (LAC) oral tablet: 2 tab(s) by gastrostomy tube once a day (18 Oct 2022 11:42)  Betadine 10% topical swab: cleanse right and left great toes (18 Oct 2022 11:42)  chlorhexidine 0.12% mucous membrane liquid: 15 milliliter(s) mucous membrane 2 times a day (18 Oct 2022 11:42)  Eucerin topical cream: Apply topically to affected area once a day bilateral feet (18 Oct 2022 11:42)  insulin glargine 100 units/mL subcutaneous solution: 8 unit(s) subcutaneous once a day (in the morning) (18 Oct 2022 11:42)  ipratropium-albuterol 0.5 mg-2.5 mg/3 mL inhalation solution: 3 milliliter(s) inhaled 4 times a day (18 Oct 2022 11:42)  LORazepam 1 mg oral tablet: 1 tab(s) by gastrostomy tube every 4 hours (18 Oct 2022 11:42)  midodrine 10 mg oral tablet: 1 tab(s) orally every 8 hours (02 Nov 2022 11:44)  MiraLax oral powder for reconstitution: g-tube (18 Oct 2022 13:04)  Multiple Vitamins oral tablet: 1 tab(s) orally once a day (18 Oct 2022 11:42)  nystatin 100,000 units/g topical powder: 1 application topically 3 times a day (18 Oct 2022 11:42)  omeprazole 20 mg oral delayed release capsule: orally 2 times a day (18 Oct 2022 11:42)  polyethylene glycol 3350 oral powder for reconstitution: 17 gram(s) by gastrostomy tube every 12 hours (18 Oct 2022 11:42)  senna 8.6 mg oral tablet: 3 tab(s) by gastrostomy tube once a day (at bedtime) (18 Oct 2022 11:42)  simethicone 80 mg oral tablet, chewable: 1 tab(s) by gastrostomy tube every 6 hours (18 Oct 2022 11:42)  sucralfate 1 g/10 mL oral suspension: 10 milliliter(s) g-tube 4 times a day (before meals and at bedtime) (18 Oct 2022 13:04)  Tylenol 325 mg oral tablet: 2 tab(s) by gastrostomy tube once a day; 60 minutes prior to dressing change  (18 Oct 2022 11:42)  Tylenol 325 mg oral tablet: 2 tab(s) by gastrostomy tube every 6 hours, As Needed (18 Oct 2022 11:42)      MEDICATIONS  (STANDING):  cadexomer iodine 0.9% Gel 1 Application(s) Topical <User Schedule>  chlorhexidine 0.12% Liquid 15 milliLiter(s) Oral Mucosa every 12 hours  chlorhexidine 2% Cloths 1 Application(s) Topical daily  chlorhexidine 2% Cloths 1 Application(s) Topical daily  dextrose 5% + sodium chloride 0.45%. 1000 milliLiter(s) (75 mL/Hr) IV Continuous <Continuous>  dextrose 5%. 1000 milliLiter(s) (100 mL/Hr) IV Continuous <Continuous>  dextrose 5%. 1000 milliLiter(s) (50 mL/Hr) IV Continuous <Continuous>  dextrose 50% Injectable 25 Gram(s) IV Push once  dextrose 50% Injectable 12.5 Gram(s) IV Push once  dextrose 50% Injectable 25 Gram(s) IV Push once  doxycycline IVPB      doxycycline IVPB 100 milliGRAM(s) IV Intermittent every 12 hours  glucagon  Injectable 1 milliGRAM(s) IntraMuscular once  heparin   Injectable 5000 Unit(s) SubCutaneous every 12 hours  insulin lispro (ADMELOG) corrective regimen sliding scale   SubCutaneous every 6 hours  lactobacillus acidophilus 1 Tablet(s) Oral daily  LORazepam     Tablet 1 milliGRAM(s) Oral four times a day  midodrine. 10 milliGRAM(s) Oral three times a day  pantoprazole    Tablet 40 milliGRAM(s) Oral before breakfast  polyethylene glycol 3350 17 Gram(s) Oral daily  povidone iodine 10% Solution 1 Application(s) Topical daily  senna 2 Tablet(s) Oral at bedtime  simethicone 80 milliGRAM(s) Chew every 6 hours  sodium chloride 0.9%. 1000 milliLiter(s) (50 mL/Hr) IV Continuous <Continuous>  trimethoprim / sulfamethoxazole IVPB 250 milliGRAM(s) IV Intermittent every 8 hours    MEDICATIONS  (PRN):  acetaminophen     Tablet .. 650 milliGRAM(s) Oral every 6 hours PRN Temp greater or equal to 38C (100.4F), Mild Pain (1 - 3)  bisacodyl Suppository 10 milliGRAM(s) Rectal daily PRN Constipation  dextrose Oral Gel 15 Gram(s) Oral once PRN Blood Glucose LESS THAN 70 milliGRAM(s)/deciliter  LORazepam   Injectable 2 milliGRAM(s) IV Push every 4 hours PRN Agitation  ondansetron Injectable 4 milliGRAM(s) IV Push every 8 hours PRN Nausea and/or Vomiting  sodium chloride 0.9% lock flush 10 milliLiter(s) IV Push every 1 hour PRN Pre/post blood products, medications, blood draw, and to maintain line patency      Diet, NPO with Tube Feed:   Tube Feeding Modality: Gastrostomy  Pulmocare  Total Volume for 24 Hours (mL): 840  Continuous  Starting Tube Feed Rate mL per Hour: 20  Increase Tube Feed Rate by (mL): 10     Every 8 hours  Until Goal Tube Feed Rate (mL per Hour): 35  Tube Feed Duration (in Hours): 24  Tube Feed Start Time: 08:00  Free Water Flush   Total Volume per Flush (mL): 250   Frequency: Every 6 Hours   Total Daily Volume of Flush (mL): 100    Start Time: 08:00  Lucas(7 Gm Arginine/7 Gm Glut/1.2 Gm HMB     Qty per Day:  1 (11-06-22 @ 19:28) [Active]  Diet, NPO with Tube Feed:   Tube Feeding Modality: Gastrostomy  Pulmocare  Total Volume for 24 Hours (mL): 840  Continuous  Starting Tube Feed Rate mL per Hour: 20  Increase Tube Feed Rate by (mL): 10     Every 8 hours  Until Goal Tube Feed Rate (mL per Hour): 35  Tube Feed Duration (in Hours): 24  Tube Feed Start Time: 12:00  Free Water Flush  Bolus   Total Volume per Flush (mL): 250   Frequency: Every 6 Hours  Lucas(7 Gm Arginine/7 Gm Glut/1.2 Gm HMB     Qty per Day:  1  No Carb Prosource TF     Qty per Day:  1 (11-05-22 @ 15:04) [Pending Verification By Attending]          Vital Signs Last 24 Hrs  T(C): 36.6 (10 Nov 2022 06:00), Max: 37.1 (09 Nov 2022 22:38)  T(F): 97.8 (10 Nov 2022 06:00), Max: 98.8 (09 Nov 2022 22:38)  HR: 67 (10 Nov 2022 07:18) (53 - 75)  BP: 131/80 (10 Nov 2022 06:00) (97/52 - 131/80)  BP(mean): 98 (10 Nov 2022 06:00) (66 - 110)  RR: 28 (10 Nov 2022 06:00) (11 - 38)  SpO2: 98% (10 Nov 2022 07:18) (50% - 100%)    Parameters below as of 10 Nov 2022 06:00      O2 Concentration (%): 50      11-09-22 @ 07:01  -  11-10-22 @ 07:00  --------------------------------------------------------  IN: 2100 mL / OUT: 1700 mL / NET: 400 mL        Mode: AC/ CMV (Assist Control/ Continuous Mandatory Ventilation), RR (machine): 16, TV (machine): 400, FiO2: 50, PEEP: 5, ITime: 1, MAP: 15, PIP: 38      LABS:                        8.7    4.56  )-----------( 164      ( 10 Nov 2022 06:19 )             29.8     11-10    139  |  105  |  9   ----------------------------<  142<H>  4.4   |  29  |  1.07    Ca    7.9<L>      10 Nov 2022 06:19  Phos  4.4     11-09  Mg     2.3     11-09    TPro  7.1  /  Alb  1.8<L>  /  TBili  0.3  /  DBili  x   /  AST  14  /  ALT  <10<L>  /  AlkPhos  104  11-09              WBC:  WBC Count: 4.56 K/uL (11-10 @ 06:19)  WBC Count: 4.61 K/uL (11-09 @ 05:55)  WBC Count: 6.08 K/uL (11-08 @ 06:53)  WBC Count: 6.86 K/uL (11-07 @ 05:41)      MICROBIOLOGY:  RECENT CULTURES:  11-07 .Abscess foot XXXX XXXX   Numerous Acinetobacter baumannii/nosocomialis group  Few Yeast    11-05 Trach Asp Tracheal Aspirate Stenotrophomonas maltophilia  Serratia marcescens   Few Squamous epithelial cells per low power field  Few polymorphonuclear leukocytes per low power field  Rare Gram Positive Rods per oil power field  Rare Gram Negative Rods per oil power field   Numerous Stenotrophomonas maltophilia  Moderate Serratia marcescens  Normal Respiratory Elvira present    11-04 Clean Catch Clean Catch (Midstream) Enterococcus faecium (vancomycin resistant) XXXX   >100,000 CFU/ml Enterococcus faecium (vancomycin resistant)  50,000 - 99,000 CFU/mL Candida albicans "Susceptibilities not performed"    11-04 .Blood Blood-Peripheral XXXX XXXX   No Growth Final                    Sodium:  Sodium, Serum: 139 mmol/L (11-10 @ 06:19)  Sodium, Serum: 138 mmol/L (11-09 @ 05:55)  Sodium, Serum: 139 mmol/L (11-08 @ 06:53)  Sodium, Serum: 135 mmol/L (11-07 @ 05:41)      1.07 mg/dL 11-10 @ 06:19  0.99 mg/dL 11-09 @ 05:55  1.03 mg/dL 11-08 @ 06:53  1.12 mg/dL 11-07 @ 05:41      Hemoglobin:  Hemoglobin: 8.7 g/dL (11-10 @ 06:19)  Hemoglobin: 8.6 g/dL (11-09 @ 05:55)  Hemoglobin: 8.9 g/dL (11-08 @ 06:53)  Hemoglobin: 8.8 g/dL (11-07 @ 05:41)      Platelets: Platelet Count - Automated: 164 K/uL (11-10 @ 06:19)  Platelet Count - Automated: 180 K/uL (11-09 @ 05:55)  Platelet Count - Automated: 159 K/uL (11-08 @ 06:53)  Platelet Count - Automated: 148 K/uL (11-07 @ 05:41)      LIVER FUNCTIONS - ( 09 Nov 2022 05:55 )  Alb: 1.8 g/dL / Pro: 7.1 g/dL / ALK PHOS: 104 U/L / ALT: <10 U/L DA / AST: 14 U/L / GGT: x                 RADIOLOGY & ADDITIONAL STUDIES:      MICROBIOLOGY:  RECENT CULTURES:  11-07 .Abscess foot XXXX XXXX   Numerous Acinetobacter baumannii/nosocomialis group  Few Yeast    11-05 Trach Asp Tracheal Aspirate Stenotrophomonas maltophilia  Serratia marcescens   Few Squamous epithelial cells per low power field  Few polymorphonuclear leukocytes per low power field  Rare Gram Positive Rods per oil power field  Rare Gram Negative Rods per oil power field   Numerous Stenotrophomonas maltophilia  Moderate Serratia marcescens  Normal Respiratory Elvira present    11-04 Clean Catch Clean Catch (Midstream) Enterococcus faecium (vancomycin resistant) XXXX   >100,000 CFU/ml Enterococcus faecium (vancomycin resistant)  50,000 - 99,000 CFU/mL Candida albicans "Susceptibilities not performed"    11-04 .Blood Blood-Peripheral XXXX XXXX   No Growth Final

## 2022-11-10 NOTE — DISCHARGE NOTE PROVIDER - NSDCCPCAREPLAN_GEN_ALL_CORE_FT
PRINCIPAL DISCHARGE DIAGNOSIS  Diagnosis: Pneumonia  Assessment and Plan of Treatment: s/p IV vanco and zosyn, to complete cours eof doxyycline and bactrim on 11/12      SECONDARY DISCHARGE DIAGNOSES  Diagnosis: Insufficiency, arterial, peripheral  Assessment and Plan of Treatment: Pt to follow up with vascular regarding role of angiogram

## 2022-11-10 NOTE — PROGRESS NOTE ADULT - ASSESSMENT
76 year old female with PMHx of dementia, COPD with chronic respiratory failure s/p trach, cardiac arrest, CHH, quadriplegia, CVA, CKD, anemia, PEG tube, and multiple hospital admissions for respiratory distress presents to the ED BIBEMS from [facility] for fever, SOB, and dark sputum output from trach.     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 Buenrostro    Infectious Diseases will continue to follow. Please call with any questions.   Andressa Brantley M.D.  [facility] Division of Infectious Diseases [phone]

## 2022-11-10 NOTE — DISCHARGE NOTE NURSING/CASE MANAGEMENT/SOCIAL WORK - NSDCPEFALRISK_GEN_ALL_CORE
For information on Fall & Injury Prevention, visit: [email or web address] OR  [email or web address] OR  https://www.cdc.gov/steadi/patient.html

## 2022-11-11 LAB
-  MINOCYCLINE: SIGNIFICANT CHANGE UP
-  POLYMYXIN B: SIGNIFICANT CHANGE UP
METHOD TYPE: SIGNIFICANT CHANGE UP

## 2022-11-12 ENCOUNTER — INPATIENT (INPATIENT)
Facility: HOSPITAL | Age: 77
LOS: 3 days | Discharge: NURSING, REHAB OR LONG-TERM CARE | DRG: 870 | End: 2022-11-16
Attending: INTERNAL MEDICINE | Admitting: INTERNAL MEDICINE
Payer: MEDICARE

## 2022-11-12 VITALS
TEMPERATURE: 100 F | SYSTOLIC BLOOD PRESSURE: 168 MMHG | HEART RATE: 119 BPM | OXYGEN SATURATION: 93 % | HEIGHT: 65 IN | DIASTOLIC BLOOD PRESSURE: 73 MMHG | RESPIRATION RATE: 36 BRPM | WEIGHT: 125.66 LBS

## 2022-11-12 DIAGNOSIS — Z93.1 GASTROSTOMY STATUS: Chronic | ICD-10-CM

## 2022-11-12 DIAGNOSIS — J44.9 CHRONIC OBSTRUCTIVE PULMONARY DISEASE, UNSPECIFIED: ICD-10-CM

## 2022-11-12 DIAGNOSIS — A41.9 SEPSIS, UNSPECIFIED ORGANISM: ICD-10-CM

## 2022-11-12 DIAGNOSIS — Z93.0 TRACHEOSTOMY STATUS: Chronic | ICD-10-CM

## 2022-11-12 DIAGNOSIS — E11.9 TYPE 2 DIABETES MELLITUS WITHOUT COMPLICATIONS: ICD-10-CM

## 2022-11-12 DIAGNOSIS — D63.8 ANEMIA IN OTHER CHRONIC DISEASES CLASSIFIED ELSEWHERE: ICD-10-CM

## 2022-11-12 DIAGNOSIS — Z29.9 ENCOUNTER FOR PROPHYLACTIC MEASURES, UNSPECIFIED: ICD-10-CM

## 2022-11-12 DIAGNOSIS — J18.9 PNEUMONIA, UNSPECIFIED ORGANISM: ICD-10-CM

## 2022-11-12 LAB
-  AMPICILLIN/SULBACTAM: SIGNIFICANT CHANGE UP
-  CEFAZOLIN: SIGNIFICANT CHANGE UP
-  CLINDAMYCIN: SIGNIFICANT CHANGE UP
-  DAPTOMYCIN: SIGNIFICANT CHANGE UP
-  ERYTHROMYCIN: SIGNIFICANT CHANGE UP
-  GENTAMICIN: SIGNIFICANT CHANGE UP
-  LINEZOLID: SIGNIFICANT CHANGE UP
-  OXACILLIN: SIGNIFICANT CHANGE UP
-  PENICILLIN: SIGNIFICANT CHANGE UP
-  RIFAMPIN: SIGNIFICANT CHANGE UP
-  TETRACYCLINE: SIGNIFICANT CHANGE UP
-  TRIMETHOPRIM/SULFAMETHOXAZOLE: SIGNIFICANT CHANGE UP
-  VANCOMYCIN: SIGNIFICANT CHANGE UP
ALBUMIN SERPL ELPH-MCNC: 2.3 G/DL — LOW (ref 3.3–5)
ALP SERPL-CCNC: 145 U/L — HIGH (ref 30–120)
ALT FLD-CCNC: 15 U/L DA — SIGNIFICANT CHANGE UP (ref 10–60)
ANION GAP SERPL CALC-SCNC: 6 MMOL/L — SIGNIFICANT CHANGE UP (ref 5–17)
APPEARANCE UR: CLEAR — SIGNIFICANT CHANGE UP
APTT BLD: 45.6 SEC — HIGH (ref 27.5–35.5)
AST SERPL-CCNC: 18 U/L — SIGNIFICANT CHANGE UP (ref 10–40)
BACTERIA # UR AUTO: ABNORMAL
BASOPHILS # BLD AUTO: 0.02 K/UL — SIGNIFICANT CHANGE UP (ref 0–0.2)
BASOPHILS NFR BLD AUTO: 0.1 % — SIGNIFICANT CHANGE UP (ref 0–2)
BILIRUB SERPL-MCNC: 0.4 MG/DL — SIGNIFICANT CHANGE UP (ref 0.2–1.2)
BILIRUB UR-MCNC: NEGATIVE — SIGNIFICANT CHANGE UP
BUN SERPL-MCNC: 21 MG/DL — SIGNIFICANT CHANGE UP (ref 7–23)
CALCIUM SERPL-MCNC: 9.4 MG/DL — SIGNIFICANT CHANGE UP (ref 8.4–10.5)
CHLORIDE SERPL-SCNC: 100 MMOL/L — SIGNIFICANT CHANGE UP (ref 96–108)
CO2 SERPL-SCNC: 30 MMOL/L — SIGNIFICANT CHANGE UP (ref 22–31)
COLOR SPEC: YELLOW — SIGNIFICANT CHANGE UP
COMMENT - URINE: SIGNIFICANT CHANGE UP
CREAT SERPL-MCNC: 1.18 MG/DL — SIGNIFICANT CHANGE UP (ref 0.5–1.3)
DIFF PNL FLD: ABNORMAL
EGFR: 47 ML/MIN/1.73M2 — LOW
EOSINOPHIL # BLD AUTO: 0 K/UL — SIGNIFICANT CHANGE UP (ref 0–0.5)
EOSINOPHIL NFR BLD AUTO: 0 % — SIGNIFICANT CHANGE UP (ref 0–6)
EPI CELLS # UR: SIGNIFICANT CHANGE UP
GLUCOSE BLDC GLUCOMTR-MCNC: 105 MG/DL — HIGH (ref 70–99)
GLUCOSE BLDC GLUCOMTR-MCNC: 111 MG/DL — HIGH (ref 70–99)
GLUCOSE BLDC GLUCOMTR-MCNC: 111 MG/DL — HIGH (ref 70–99)
GLUCOSE SERPL-MCNC: 168 MG/DL — HIGH (ref 70–99)
GLUCOSE UR QL: NEGATIVE MG/DL — SIGNIFICANT CHANGE UP
HCT VFR BLD CALC: 34.3 % — LOW (ref 34.5–45)
HGB BLD-MCNC: 10.3 G/DL — LOW (ref 11.5–15.5)
HYALINE CASTS # UR AUTO: ABNORMAL
IMM GRANULOCYTES NFR BLD AUTO: 0.5 % — SIGNIFICANT CHANGE UP (ref 0–0.9)
INR BLD: 1.24 RATIO — HIGH (ref 0.88–1.16)
KETONES UR-MCNC: NEGATIVE — SIGNIFICANT CHANGE UP
LACTATE SERPL-SCNC: 1.8 MMOL/L — SIGNIFICANT CHANGE UP (ref 0.7–2)
LEUKOCYTE ESTERASE UR-ACNC: ABNORMAL
LYMPHOCYTES # BLD AUTO: 0.42 K/UL — LOW (ref 1–3.3)
LYMPHOCYTES # BLD AUTO: 2.7 % — LOW (ref 13–44)
MCHC RBC-ENTMCNC: 27.2 PG — SIGNIFICANT CHANGE UP (ref 27–34)
MCHC RBC-ENTMCNC: 30 GM/DL — LOW (ref 32–36)
MCV RBC AUTO: 90.7 FL — SIGNIFICANT CHANGE UP (ref 80–100)
METHOD TYPE: SIGNIFICANT CHANGE UP
MONOCYTES # BLD AUTO: 0.86 K/UL — SIGNIFICANT CHANGE UP (ref 0–0.9)
MONOCYTES NFR BLD AUTO: 5.6 % — SIGNIFICANT CHANGE UP (ref 2–14)
NEUTROPHILS # BLD AUTO: 14.06 K/UL — HIGH (ref 1.8–7.4)
NEUTROPHILS NFR BLD AUTO: 91.1 % — HIGH (ref 43–77)
NITRITE UR-MCNC: NEGATIVE — SIGNIFICANT CHANGE UP
NRBC # BLD: 0 /100 WBCS — SIGNIFICANT CHANGE UP (ref 0–0)
PH UR: 5 — SIGNIFICANT CHANGE UP (ref 5–8)
PLATELET # BLD AUTO: 264 K/UL — SIGNIFICANT CHANGE UP (ref 150–400)
POTASSIUM SERPL-MCNC: 5.3 MMOL/L — SIGNIFICANT CHANGE UP (ref 3.5–5.3)
POTASSIUM SERPL-SCNC: 5.3 MMOL/L — SIGNIFICANT CHANGE UP (ref 3.5–5.3)
PROT SERPL-MCNC: 8.8 G/DL — HIGH (ref 6–8.3)
PROT UR-MCNC: 100 MG/DL
PROTHROM AB SERPL-ACNC: 14.7 SEC — HIGH (ref 10.5–13.4)
RAPID RVP RESULT: SIGNIFICANT CHANGE UP
RBC # BLD: 3.78 M/UL — LOW (ref 3.8–5.2)
RBC # FLD: 16.3 % — HIGH (ref 10.3–14.5)
RBC CASTS # UR COMP ASSIST: SIGNIFICANT CHANGE UP /HPF (ref 0–4)
SARS-COV-2 RNA SPEC QL NAA+PROBE: SIGNIFICANT CHANGE UP
SODIUM SERPL-SCNC: 136 MMOL/L — SIGNIFICANT CHANGE UP (ref 135–145)
SP GR SPEC: 1.02 — SIGNIFICANT CHANGE UP (ref 1.01–1.02)
UROBILINOGEN FLD QL: NEGATIVE MG/DL — SIGNIFICANT CHANGE UP
WBC # BLD: 15.44 K/UL — HIGH (ref 3.8–10.5)
WBC # FLD AUTO: 15.44 K/UL — HIGH (ref 3.8–10.5)
WBC UR QL: ABNORMAL

## 2022-11-12 PROCEDURE — 36680 INSERT NEEDLE BONE CAVITY: CPT

## 2022-11-12 PROCEDURE — 99223 1ST HOSP IP/OBS HIGH 75: CPT | Mod: AI

## 2022-11-12 PROCEDURE — 99291 CRITICAL CARE FIRST HOUR: CPT | Mod: CS,25

## 2022-11-12 PROCEDURE — 93970 EXTREMITY STUDY: CPT | Mod: 26

## 2022-11-12 PROCEDURE — 71045 X-RAY EXAM CHEST 1 VIEW: CPT | Mod: 26

## 2022-11-12 PROCEDURE — 93010 ELECTROCARDIOGRAM REPORT: CPT

## 2022-11-12 RX ORDER — IPRATROPIUM/ALBUTEROL SULFATE 18-103MCG
3 AEROSOL WITH ADAPTER (GRAM) INHALATION
Qty: 0 | Refills: 0 | DISCHARGE

## 2022-11-12 RX ORDER — MIDODRINE HYDROCHLORIDE 2.5 MG/1
10 TABLET ORAL EVERY 8 HOURS
Refills: 0 | Status: DISCONTINUED | OUTPATIENT
Start: 2022-11-12 | End: 2022-11-16

## 2022-11-12 RX ORDER — BACITRACIN ZINC 500 UNIT/G
1 OINTMENT IN PACKET (EA) TOPICAL DAILY
Refills: 0 | Status: DISCONTINUED | OUTPATIENT
Start: 2022-11-12 | End: 2022-11-16

## 2022-11-12 RX ORDER — CHLORHEXIDINE GLUCONATE 213 G/1000ML
15 SOLUTION TOPICAL EVERY 12 HOURS
Refills: 0 | Status: DISCONTINUED | OUTPATIENT
Start: 2022-11-12 | End: 2022-11-16

## 2022-11-12 RX ORDER — LANOLIN/MINERAL OIL
1 LOTION (ML) TOPICAL DAILY
Refills: 0 | Status: DISCONTINUED | OUTPATIENT
Start: 2022-11-12 | End: 2022-11-16

## 2022-11-12 RX ORDER — IPRATROPIUM/ALBUTEROL SULFATE 18-103MCG
3 AEROSOL WITH ADAPTER (GRAM) INHALATION EVERY 6 HOURS
Refills: 0 | Status: DISCONTINUED | OUTPATIENT
Start: 2022-11-12 | End: 2022-11-12

## 2022-11-12 RX ORDER — LACTOBACILLUS ACIDOPHILUS 100MM CELL
2 CAPSULE ORAL DAILY
Refills: 0 | Status: DISCONTINUED | OUTPATIENT
Start: 2022-11-12 | End: 2022-11-16

## 2022-11-12 RX ORDER — DEXTROSE 50 % IN WATER 50 %
25 SYRINGE (ML) INTRAVENOUS ONCE
Refills: 0 | Status: DISCONTINUED | OUTPATIENT
Start: 2022-11-12 | End: 2022-11-16

## 2022-11-12 RX ORDER — NYSTATIN CREAM 100000 [USP'U]/G
1 CREAM TOPICAL THREE TIMES A DAY
Refills: 0 | Status: DISCONTINUED | OUTPATIENT
Start: 2022-11-12 | End: 2022-11-16

## 2022-11-12 RX ORDER — SODIUM CHLORIDE 9 MG/ML
1000 INJECTION, SOLUTION INTRAVENOUS
Refills: 0 | Status: DISCONTINUED | OUTPATIENT
Start: 2022-11-12 | End: 2022-11-16

## 2022-11-12 RX ORDER — OMEPRAZOLE 10 MG/1
0 CAPSULE, DELAYED RELEASE ORAL
Qty: 0 | Refills: 0 | DISCHARGE

## 2022-11-12 RX ORDER — ALBUTEROL 90 UG/1
2 AEROSOL, METERED ORAL
Refills: 0 | Status: DISCONTINUED | OUTPATIENT
Start: 2022-11-12 | End: 2022-11-16

## 2022-11-12 RX ORDER — SIMETHICONE 80 MG/1
80 TABLET, CHEWABLE ORAL EVERY 6 HOURS
Refills: 0 | Status: DISCONTINUED | OUTPATIENT
Start: 2022-11-12 | End: 2022-11-16

## 2022-11-12 RX ORDER — POLYETHYLENE GLYCOL 3350 17 G/17G
17 POWDER, FOR SOLUTION ORAL EVERY 12 HOURS
Refills: 0 | Status: DISCONTINUED | OUTPATIENT
Start: 2022-11-12 | End: 2022-11-16

## 2022-11-12 RX ORDER — INSULIN GLARGINE 100 [IU]/ML
8 INJECTION, SOLUTION SUBCUTANEOUS EVERY MORNING
Refills: 0 | Status: DISCONTINUED | OUTPATIENT
Start: 2022-11-12 | End: 2022-11-16

## 2022-11-12 RX ORDER — DEXTROSE 50 % IN WATER 50 %
15 SYRINGE (ML) INTRAVENOUS ONCE
Refills: 0 | Status: DISCONTINUED | OUTPATIENT
Start: 2022-11-12 | End: 2022-11-16

## 2022-11-12 RX ORDER — MEROPENEM 1 G/30ML
1000 INJECTION INTRAVENOUS ONCE
Refills: 0 | Status: COMPLETED | OUTPATIENT
Start: 2022-11-12 | End: 2022-11-12

## 2022-11-12 RX ORDER — ACETAMINOPHEN 500 MG
650 TABLET ORAL ONCE
Refills: 0 | Status: COMPLETED | OUTPATIENT
Start: 2022-11-12 | End: 2022-11-12

## 2022-11-12 RX ORDER — ACETAMINOPHEN 500 MG
650 TABLET ORAL EVERY 6 HOURS
Refills: 0 | Status: DISCONTINUED | OUTPATIENT
Start: 2022-11-12 | End: 2022-11-16

## 2022-11-12 RX ORDER — FENTANYL CITRATE 50 UG/ML
50 INJECTION INTRAVENOUS ONCE
Refills: 0 | Status: DISCONTINUED | OUTPATIENT
Start: 2022-11-12 | End: 2022-11-12

## 2022-11-12 RX ORDER — INSULIN LISPRO 100/ML
VIAL (ML) SUBCUTANEOUS AT BEDTIME
Refills: 0 | Status: DISCONTINUED | OUTPATIENT
Start: 2022-11-12 | End: 2022-11-12

## 2022-11-12 RX ORDER — SODIUM CHLORIDE 9 MG/ML
1000 INJECTION INTRAMUSCULAR; INTRAVENOUS; SUBCUTANEOUS ONCE
Refills: 0 | Status: COMPLETED | OUTPATIENT
Start: 2022-11-12 | End: 2022-11-12

## 2022-11-12 RX ORDER — PANTOPRAZOLE SODIUM 20 MG/1
40 TABLET, DELAYED RELEASE ORAL DAILY
Refills: 0 | Status: DISCONTINUED | OUTPATIENT
Start: 2022-11-12 | End: 2022-11-16

## 2022-11-12 RX ORDER — MEROPENEM 1 G/30ML
1000 INJECTION INTRAVENOUS EVERY 12 HOURS
Refills: 0 | Status: DISCONTINUED | OUTPATIENT
Start: 2022-11-13 | End: 2022-11-14

## 2022-11-12 RX ORDER — INSULIN LISPRO 100/ML
VIAL (ML) SUBCUTANEOUS EVERY 6 HOURS
Refills: 0 | Status: DISCONTINUED | OUTPATIENT
Start: 2022-11-13 | End: 2022-11-16

## 2022-11-12 RX ORDER — VANCOMYCIN HCL 1 G
1000 VIAL (EA) INTRAVENOUS ONCE
Refills: 0 | Status: COMPLETED | OUTPATIENT
Start: 2022-11-12 | End: 2022-11-12

## 2022-11-12 RX ORDER — PIPERACILLIN AND TAZOBACTAM 4; .5 G/20ML; G/20ML
3.38 INJECTION, POWDER, LYOPHILIZED, FOR SOLUTION INTRAVENOUS EVERY 8 HOURS
Refills: 0 | Status: DISCONTINUED | OUTPATIENT
Start: 2022-11-12 | End: 2022-11-12

## 2022-11-12 RX ORDER — INSULIN LISPRO 100/ML
VIAL (ML) SUBCUTANEOUS
Refills: 0 | Status: DISCONTINUED | OUTPATIENT
Start: 2022-11-12 | End: 2022-11-12

## 2022-11-12 RX ORDER — PANTOPRAZOLE SODIUM 20 MG/1
40 TABLET, DELAYED RELEASE ORAL
Refills: 0 | Status: DISCONTINUED | OUTPATIENT
Start: 2022-11-12 | End: 2022-11-12

## 2022-11-12 RX ORDER — MEROPENEM 1 G/30ML
INJECTION INTRAVENOUS
Refills: 0 | Status: DISCONTINUED | OUTPATIENT
Start: 2022-11-12 | End: 2022-11-14

## 2022-11-12 RX ORDER — GLUCAGON INJECTION, SOLUTION 0.5 MG/.1ML
1 INJECTION, SOLUTION SUBCUTANEOUS ONCE
Refills: 0 | Status: DISCONTINUED | OUTPATIENT
Start: 2022-11-12 | End: 2022-11-16

## 2022-11-12 RX ORDER — POVIDONE-IODINE 5 %
1 AEROSOL (ML) TOPICAL THREE TIMES A DAY
Refills: 0 | Status: DISCONTINUED | OUTPATIENT
Start: 2022-11-12 | End: 2022-11-16

## 2022-11-12 RX ORDER — ACETAMINOPHEN 500 MG
650 TABLET ORAL EVERY 6 HOURS
Refills: 0 | Status: DISCONTINUED | OUTPATIENT
Start: 2022-11-12 | End: 2022-11-12

## 2022-11-12 RX ORDER — SENNA PLUS 8.6 MG/1
3 TABLET ORAL AT BEDTIME
Refills: 0 | Status: DISCONTINUED | OUTPATIENT
Start: 2022-11-12 | End: 2022-11-16

## 2022-11-12 RX ORDER — SUCRALFATE 1 G
1 TABLET ORAL
Refills: 0 | Status: DISCONTINUED | OUTPATIENT
Start: 2022-11-12 | End: 2022-11-16

## 2022-11-12 RX ORDER — PIPERACILLIN AND TAZOBACTAM 4; .5 G/20ML; G/20ML
3.38 INJECTION, POWDER, LYOPHILIZED, FOR SOLUTION INTRAVENOUS ONCE
Refills: 0 | Status: COMPLETED | OUTPATIENT
Start: 2022-11-12 | End: 2022-11-12

## 2022-11-12 RX ORDER — POVIDONE-IODINE 5 %
0 AEROSOL (ML) TOPICAL
Qty: 0 | Refills: 0 | DISCHARGE

## 2022-11-12 RX ORDER — FERROUS SULFATE 325(65) MG
325 TABLET ORAL DAILY
Refills: 0 | Status: DISCONTINUED | OUTPATIENT
Start: 2022-11-12 | End: 2022-11-16

## 2022-11-12 RX ORDER — DEXTROSE 50 % IN WATER 50 %
12.5 SYRINGE (ML) INTRAVENOUS ONCE
Refills: 0 | Status: DISCONTINUED | OUTPATIENT
Start: 2022-11-12 | End: 2022-11-16

## 2022-11-12 RX ORDER — INSULIN LISPRO 100/ML
0 VIAL (ML) SUBCUTANEOUS
Qty: 0 | Refills: 0 | DISCHARGE

## 2022-11-12 RX ORDER — SODIUM HYPOCHLORITE 0.125 %
1 SOLUTION, NON-ORAL MISCELLANEOUS
Refills: 0 | Status: DISCONTINUED | OUTPATIENT
Start: 2022-11-12 | End: 2022-11-16

## 2022-11-12 RX ORDER — HEPARIN SODIUM 5000 [USP'U]/ML
5000 INJECTION INTRAVENOUS; SUBCUTANEOUS EVERY 12 HOURS
Refills: 0 | Status: DISCONTINUED | OUTPATIENT
Start: 2022-11-12 | End: 2022-11-16

## 2022-11-12 RX ADMIN — Medication 1 APPLICATION(S): at 18:04

## 2022-11-12 RX ADMIN — NYSTATIN CREAM 1 APPLICATION(S): 100000 CREAM TOPICAL at 23:03

## 2022-11-12 RX ADMIN — Medication 325 MILLIGRAM(S): at 12:58

## 2022-11-12 RX ADMIN — Medication 650 MILLIGRAM(S): at 06:30

## 2022-11-12 RX ADMIN — SIMETHICONE 80 MILLIGRAM(S): 80 TABLET, CHEWABLE ORAL at 18:03

## 2022-11-12 RX ADMIN — INSULIN GLARGINE 8 UNIT(S): 100 INJECTION, SOLUTION SUBCUTANEOUS at 09:40

## 2022-11-12 RX ADMIN — Medication 1 TABLET(S): at 18:45

## 2022-11-12 RX ADMIN — SIMETHICONE 80 MILLIGRAM(S): 80 TABLET, CHEWABLE ORAL at 12:59

## 2022-11-12 RX ADMIN — FENTANYL CITRATE 50 MICROGRAM(S): 50 INJECTION INTRAVENOUS at 18:39

## 2022-11-12 RX ADMIN — SODIUM CHLORIDE 1000 MILLILITER(S): 9 INJECTION INTRAMUSCULAR; INTRAVENOUS; SUBCUTANEOUS at 08:18

## 2022-11-12 RX ADMIN — PIPERACILLIN AND TAZOBACTAM 200 GRAM(S): 4; .5 INJECTION, POWDER, LYOPHILIZED, FOR SOLUTION INTRAVENOUS at 06:55

## 2022-11-12 RX ADMIN — NYSTATIN CREAM 1 APPLICATION(S): 100000 CREAM TOPICAL at 14:20

## 2022-11-12 RX ADMIN — ALBUTEROL 2 PUFF(S): 90 AEROSOL, METERED ORAL at 20:43

## 2022-11-12 RX ADMIN — CHLORHEXIDINE GLUCONATE 15 MILLILITER(S): 213 SOLUTION TOPICAL at 18:02

## 2022-11-12 RX ADMIN — ALBUTEROL 2 PUFF(S): 90 AEROSOL, METERED ORAL at 10:00

## 2022-11-12 RX ADMIN — MEROPENEM 100 MILLIGRAM(S): 1 INJECTION INTRAVENOUS at 14:20

## 2022-11-12 RX ADMIN — SODIUM CHLORIDE 1000 MILLILITER(S): 9 INJECTION INTRAMUSCULAR; INTRAVENOUS; SUBCUTANEOUS at 06:51

## 2022-11-12 RX ADMIN — HEPARIN SODIUM 5000 UNIT(S): 5000 INJECTION INTRAVENOUS; SUBCUTANEOUS at 18:03

## 2022-11-12 RX ADMIN — Medication 2 TABLET(S): at 12:59

## 2022-11-12 RX ADMIN — PANTOPRAZOLE SODIUM 40 MILLIGRAM(S): 20 TABLET, DELAYED RELEASE ORAL at 23:04

## 2022-11-12 RX ADMIN — Medication 1 APPLICATION(S): at 13:51

## 2022-11-12 RX ADMIN — Medication 250 MILLIGRAM(S): at 07:40

## 2022-11-12 RX ADMIN — Medication 1 MILLIGRAM(S): at 18:02

## 2022-11-12 RX ADMIN — Medication 1 APPLICATION(S): at 13:48

## 2022-11-12 RX ADMIN — Medication 1 APPLICATION(S): at 10:22

## 2022-11-12 RX ADMIN — MIDODRINE HYDROCHLORIDE 10 MILLIGRAM(S): 2.5 TABLET ORAL at 13:03

## 2022-11-12 RX ADMIN — Medication 2 MILLIGRAM(S): at 19:41

## 2022-11-12 RX ADMIN — MIDODRINE HYDROCHLORIDE 10 MILLIGRAM(S): 2.5 TABLET ORAL at 20:03

## 2022-11-12 RX ADMIN — Medication 1000 MILLIGRAM(S): at 09:13

## 2022-11-12 RX ADMIN — PIPERACILLIN AND TAZOBACTAM 3.38 GRAM(S): 4; .5 INJECTION, POWDER, LYOPHILIZED, FOR SOLUTION INTRAVENOUS at 07:46

## 2022-11-12 RX ADMIN — Medication 1 MILLIGRAM(S): at 23:03

## 2022-11-12 RX ADMIN — Medication 1 GRAM(S): at 12:58

## 2022-11-12 RX ADMIN — Medication 1 TABLET(S): at 12:58

## 2022-11-12 RX ADMIN — Medication 650 MILLIGRAM(S): at 07:00

## 2022-11-12 RX ADMIN — Medication 1 GRAM(S): at 18:42

## 2022-11-12 RX ADMIN — Medication 1 MILLIGRAM(S): at 13:01

## 2022-11-12 NOTE — ED ADULT NURSE NOTE - NSIMPLEMENTINTERV_GEN_ALL_ED
Implemented All Fall with Harm Risk Interventions:  [place] to call system. Call bell, personal items and telephone within reach. Instruct patient to call for assistance. Room bathroom lighting operational. Non-slip footwear when patient is off stretcher. Physically safe environment: no spills, clutter or unnecessary equipment. Stretcher in lowest position, wheels locked, appropriate side rails in place. Provide visual cue, wrist band, yellow gown, etc. Monitor gait and stability. Monitor for mental status changes and reorient to person, place, and time. Review medications for side effects contributing to fall risk. Reinforce activity limits and safety measures with patient and family. Provide visual clues: red socks.

## 2022-11-12 NOTE — PROGRESS NOTE ADULT - ASSESSMENT
Pt is a 77 y/o F pmhx of dementia, COPD, chronic RF requiring trach and peg, cardiac arrest, quadriplegia, CVA, CKD w/ previous hospital admissions for respiratory distress presents to  ED w/ complaints of SOB and low grade fever. Pt admitted to [facility] for sepsis in setting of possible VAP and started on Meropenem and bactrim.      1. Severe sepsis w/o shock   2. VAP   3. COPD  4. Acute hypoxic respiratory failure     Plan:     Neuro: At baseline, appears agitated over breathing the vent, will given 2mg IVP ativan with accordance w/ family for sedation and compliance.     CV: No active issues continue to monitor and maintain MAP >65. Continue 10 midodrine Q8 hr.    Pulm: Acute hypoxic respiratory failure in setting of VAP. Low TV ventilation using 4-6cc/kg of IBW for lung protective strategies, will titrate FiO2 to maintain SpO2>90%.     GI: Diet: NPO w/ tube feeds, protonix for GI PPX     Renal: No active issues continue to monitor and avoid nephrotoxic meds.     Endo: Glucose <180, mag>2, K>4 for cardiac arrythmia suppression.     Heme: Heparin SQ for DVT PPX     ID: Sepsis in setting of suspected VAP, started on Meropenem and bactrim. WBC down trending, continue to trend.

## 2022-11-12 NOTE — H&P ADULT - HISTORY OF PRESENT ILLNESS
76 year old female with PMHx of dementia, COPD with chronic respiratory failure s/p trach, cardiac arrest, CHH, quadriplegia, CVA, CKD, anemia, PEG tube, and multiple hospital admissions for respiratory distress presents to the ED BIBEMS from [facility] for shortness of breath, found ot have low grade fever, midline malfunction, and leukocytosis to 16, concerning for sepsis.  Patient was discharged 2 days ago, but developed this morning increased tachypnea, respiratory distress and it appeared her midline was no longer working, and patient might not be able to receive the antibiotics bactrim and doxycycline which had been prescribed for her to continue in outpt setting after discharge form the hospital. 76 year old female with PMHx of dementia, COPD with chronic respiratory failure s/p trach, cardiac arrest, CHH, quadriplegia, CVA, CKD, anemia, PEG tube, and multiple hospital admissions for respiratory distress presents to the ED BIBEMS from [facility] for shortness of breath, found ot have low grade fever, midline malfunction, and leukocytosis to 16, fever, concerning for sepsis.  Patient was discharged 2 days ago, but developed this morning increased tachypnea, respiratory distress and it appeared her midline was no longer working, and patient might not be able to receive the antibiotics bactrim and doxycycline which had been prescribed for her to continue in outpt setting after discharge form the hospital.

## 2022-11-12 NOTE — PATIENT PROFILE ADULT - FALL HARM RISK - HARM RISK INTERVENTIONS

## 2022-11-12 NOTE — H&P ADULT - NSHPREVIEWOFSYSTEMS_GEN_ALL_CORE
CONSTITUTIONAL: + Low grade fever  EYES/ENT: No visual changes, no throat pain   RESPIRATORY: + shortness of breath  CARDIOVASCULAR: No chest pain or palpitations  GASTROINTESTINAL: No abdominal, nausea, vomiting, or hematemesis; No diarrhea or constipation. No melena or hematochezia.  GENITOURINARY: No dysuria, frequency or hematuria  NEUROLOGICAL: No dizziness, numbness, or weakness  SKIN: No itching, burning, rashes, or lesions

## 2022-11-12 NOTE — H&P ADULT - NSHPLABSRESULTS_GEN_ALL_CORE
10.3   15.44 )-----------( 264      ( 12 Nov 2022 06:53 )             34.3     11-12    136  |  100  |  21  ----------------------------<  168<H>  5.3   |  30  |  1.18    Ca    9.4      12 Nov 2022 06:53    TPro  8.8<H>  /  Alb  2.3<L>  /  TBili  0.4  /  DBili  x   /  AST  18  /  ALT  15  /  AlkPhos  145<H>  11-12

## 2022-11-12 NOTE — PROVIDER CONTACT NOTE (EICU) - SITUATION
pt with chronic  resp failure on vent via trach - readmitted from NH with recurrent  low grade fever, leukocytosis -.pt on  1 mg Ativan Q4 via Peg  for  intermittent tachypnea and vent dyssynchrony at baseline.

## 2022-11-12 NOTE — H&P ADULT - NSHPPHYSICALEXAM_GEN_ALL_CORE
VITAL SIGNS:    Vital Signs Last 24 Hrs  T(C): 37.8 (12 Nov 2022 06:10), Max: 37.8 (12 Nov 2022 06:10)  T(F): 100 (12 Nov 2022 06:10), Max: 100 (12 Nov 2022 06:10)  HR: 90 (12 Nov 2022 08:30) (90 - 119)  BP: 122/57 (12 Nov 2022 08:30) (122/57 - 168/73)  BP(mean): --  RR: 37 (12 Nov 2022 08:30) (35 - 41)  SpO2: 100% (12 Nov 2022 08:30) (93% - 100%)    Parameters below as of 12 Nov 2022 08:30  Patient On (Oxygen Delivery Method): ventilator        PHYSICAL EXAM:     GENERAL: vented  HEENT:  neck supple, MMM  RESPIRATORY: LCTAB/L, no rhonchi, rales, or wheezing  CARDIOVASCULAR: RRR, no murmurs, gallops, rubs  ABDOMINAL: soft, non-tender, non-distended, positive bowel sounds   EXTREMITIES: no clubbing, cyanosis, or edema; gangrenous toes  NEUROLOGICAL: vented, opens eyes  SKIN: gangrenous toe  MUSCULOSKELETAL: no gross joint deformity VITAL SIGNS:    Vital Signs Last 24 Hrs  T(C): 37.8 (12 Nov 2022 06:10), Max: 37.8 (12 Nov 2022 06:10)  T(F): 100 (12 Nov 2022 06:10), Max: 100 (12 Nov 2022 06:10)  HR: 90 (12 Nov 2022 08:30) (90 - 119)  BP: 122/57 (12 Nov 2022 08:30) (122/57 - 168/73)  BP(mean): --  RR: 37 (12 Nov 2022 08:30) (35 - 41)  SpO2: 100% (12 Nov 2022 08:30) (93% - 100%)    Parameters below as of 12 Nov 2022 08:30  Patient On (Oxygen Delivery Method): ventilator        PHYSICAL EXAM:     GENERAL: vented  HEENT:  neck supple, MMM  RESPIRATORY: LCTAB/L, no rhonchi, rales, or wheezing  CARDIOVASCULAR: RRR, no murmurs, gallops, rubs  ABDOMINAL: soft, non-tender, non-distended, positive bowel sounds   EXTREMITIES: no clubbing, cyanosis, or edema; some discoloration in feet/toes, chornic arterial insuff  NEUROLOGICAL: vented, opens eyes  SKIN: discoloration in toes  MUSCULOSKELETAL: no gross joint deformity VITAL SIGNS:    Vital Signs Last 24 Hrs  T(C): 37.8 (12 Nov 2022 06:10), Max: 37.8 (12 Nov 2022 06:10)  T(F): 100 (12 Nov 2022 06:10), Max: 100 (12 Nov 2022 06:10)  HR: 90 (12 Nov 2022 08:30) (90 - 119)  BP: 122/57 (12 Nov 2022 08:30) (122/57 - 168/73)  BP(mean): --  RR: 37 (12 Nov 2022 08:30) (35 - 41)  SpO2: 100% (12 Nov 2022 08:30) (93% - 100%)    Parameters below as of 12 Nov 2022 08:30  Patient On (Oxygen Delivery Method): ventilator        PHYSICAL EXAM:     GENERAL: vented  HEENT:  neck supple, MMM  RESPIRATORY: LCTAB/L, no rhonchi, rales, or wheezing  CARDIOVASCULAR: RRR, no murmurs, gallops, rubs  ABDOMINAL: soft, non-tender, non-distended, positive bowel sounds   EXTREMITIES: no clubbing, cyanosis, or edema; some discoloration in feet/toes, chronic arterial insuff  NEUROLOGICAL: vented, opens eyes  SKIN: discoloration in toes  MUSCULOSKELETAL: no gross joint deformity

## 2022-11-12 NOTE — ED PROVIDER NOTE - PHYSICAL EXAMINATION
Gen: trached/vented, nonverbal  Head/eyes: NC/AT  ENT: trached/vented  Neck: supple  Pulm/lung: coarse breath sounds b/l  CV/heart: RRR  GI/Abd: soft, NT/ND  Musculoskeletal: no edema/erythema/cyanosis, no deformity  Skin: no rash  Neuro: vented opens eyes, aphasic

## 2022-11-12 NOTE — H&P ADULT - PROBLEM SELECTOR PLAN 4
Patient has been notified of the results pt verbalized understanding no other questions or concerns at this time.    ferrous sulfate

## 2022-11-12 NOTE — H&P ADULT - ASSESSMENT
76 year old female with PMHx of dementia, COPD with chronic respiratory failure s/p trach, cardiac arrest, CHH, quadriplegia, CVA, CKD, anemia, PEG tube, and multiple hospital admissions for respiratory distress presents to the ED BIBEMS from [facility] for shortness of breath, found ot have low grade fever, midline malfunction, and leukocytosis to 16, concerning for sepsis

## 2022-11-12 NOTE — H&P ADULT - NSHPSOCIALHISTORY_GEN_ALL_CORE
lives at living facility  no tob, etoh, illicits lives at living facility  no tob, etoh, illicits  has  involved on care

## 2022-11-12 NOTE — H&P ADULT - PROBLEM SELECTOR PLAN 1
Pt has signs of sepsis possibly seconday to unresolved pneumonia, ventilator associated pneumonia  - recently admitted for VAP, was treated with vanco zosyn, chnaged to doxy and bactrim, discharged just 2 days ago with abx via midline  - however, midline appears to no longer be working, and posisbly pt was unable to get abx, now with leukocytosis and fever, and respiraitry distress  - care d/w ID, Dr. Ricardo Chairez - he will asses spt and start pt on appropriate abx regiment, for now, pt has been dosed vanco and zosyn x 1 in the ED  - will f/u cultures  - f/u cxr  - cont vent and monitor in [facility]U Pt has signs of sepsis possibly seconday to unresolved pneumonia, ventilator associated pneumonia  - recently admitted for VAP, was treated with vanco zosyn, chnaged to doxy and bactrim, discharged just 2 days ago with abx via midline  - will need IR eval for midline replacement  - however, midline appears to no longer be working, and posisbly pt was unable to get abx, now with leukocytosis and fever, and respiraitry distress  - care d/w ID, Dr. Ricardo Chairez - he will asses spt and start pt on appropriate abx regiment, for now, pt has been dosed vanco and zosyn x 1 in the ED  - will f/u cultures  - f/u cxr  - cont vent and monitor in [facility]PCU Pt has signs of sepsis possibly seconday to unresolved pneumonia, ventilator associated pneumonia  - recently admitted for VAP, was treated with vanco zosyn, chnaged to doxy and bactrim, discharged just 2 days ago with abx via picc line  - however, picc line appears to no longer be working, and possibly pt was unable to get abx, now with leukocytosis and fever, and respiratory distress  - care d/w ID, Dr. Ricardo Chairez - he will asses spt and start pt on appropriate abx regiment, for now, pt has been dosed vanco and zosyn x 1 in the ED  - will f/u cultures  - f/u cxr  - cont vent and monitor in [facility] Pt has signs of sepsis possibly secondary to unresolved pneumonia, ventilator associated pneumonia  - recently admitted for VAP, was treated with vanco zosyn, chnaged to doxy and bactrim, discharged just 2 days ago with abx via mid line  - however, mid line appears to no longer be working, and possibly pt was unable to get abx, now with leukocytosis and fever, and respiratory distress  - care d/w ID, Dr. Ricardo Chairez - he will asses spt and start pt on appropriate abx regiment, for now, pt has been dosed vanco and zosyn x 1 in the ED  - will f/u cultures  - f/u cxr  - cont vent and monitor in SPCU

## 2022-11-12 NOTE — CONSULT NOTE ADULT - ASSESSMENT
Initial evaluation/Pulmonary Critical Care consultation requested on 11/12/2022  by Dr GILL   from Dr Sandoval   Patient examined chart reviewed    HOSPITAL ADMISSION   PATIENT CAME  FROM (if information available)      REVIEW OF SYMPTOMS      Able to give (reliable) ROS  NO     PHYSICAL EXAM    HEENT Unremarkable  atraumatic   RESP Fair air entry EXP prolonged    Harsh breath sound Resp distres mild   CARDIAC S1 S2 No S3     NO JVD    ABDOMEN SOFT BS PRESENT NOT DISTENDED No hepatosplenomegaly   PEDAL EDEMA present No calf tenderness  NO rash       AGE/SEX.   76 f  DOA.  11/12/2022   CC .  11/12/2022 resp distress sent back from [facility]    GENERAL DATA .   GOC.  11/12/2022 full code        ALLGY. codeine                            WT.  11/12/2022 57  BMI.   11/12/2022 20                           ICU STAY. 11/12/2022  COVID. 11/12/2022 scv2 (-)      PROCEDURES.  11/12/2022 IO in ER    BEST PRACTICE ISSUES.    HOB ELEVATN. Yes  DVT PPLX.  11/12/2022 hosc     SQUIRES PPLX.  11/12/2022 protonix 40     11/12/2022 carafate   INFN PPLX.  11/12/2022 chlorhexidine .12%   SP SW EM.        DIET.  11/12/2022 jevity 30/h gt      VS/ PO/IO/ VENT/ DRIPS.   11/12/2022 98f 77 115/60     11/12/2022 18/400/5/100%       OVERALL ASSESSMENT/DISPOSITION.  76 f PMH trach peg cva cac anoxic encephalo PH 8/19/2022 pasp 58 bl pl effs  r thora 6/8/2022 and l thora 5/25/2022 were lp exudates  recurrent hospitalizations HO CR psuedomonas 5/2/2022 zosyn 8/19/2022  recent admission 10/18-11/2/2022 uc 10/18 100K E coli  sp 10/19 Stenotrophomonas  10/19-10/26/2022 levaquin 750  10/21/2022 rocephin x 7d Dr BRITTANY Brantley readmitted recently  11/4-11/10/2022 with fever trach 11/5 stenotrophomonas   uc 11/4 vr enterococc 50-99 candida   11/4/2022 levaquin 750 dced 11/7 doxy  11/8 bactrim 250.3    Pt sent back from [facility] 11/12/2022 with resp distress /70 p 119 r 36 T 100 po 93 on 100% GCS 10 leukocytosis w 15   Pulm crit care consulted      PROBLEMS.  Vent dependent   Trach poa 11/12/2022  VAP 11/12/2022  COPD  Low BP 11/12/2022   peg poa 11/12/2022   ANEMIA 11/12/2022 Hb 10.3       ASSESSMENT/RECOMMENDATIONS .   RESP.  .. Gas exchange.  Monitor & target po 90-95%  11/12/2022 check abg   .. RO VTE.  V duplx legs 11/4 (-)   11/12/2022 recehck venous duplx   .. COPD.  11/12/2022 albuterol 2px2   11/12/2022 duoneb.4   cont rx  INFECTION.  .. SCV2 status.  Scv2 11/12/2022 scv2 (-)   .. VAP   w 11/12/2022 w 15.4   ua 11/12/2022 w 6-10   cxr 11/12/2022 diffuse infiltr nsc 11/8   rvp 11/12/2022 (-)   11/12/2022 Pt given a dose of zosyn 6a   11/12/2022 id consultd   11/12/2022 resume zosyn for now   check sp cs mrsa   CARDIAC.  .. Hemodynamics.   .. low bp.  la 11/12/2022 la 1.8   11/12/2022 midodrine 10.3   target map 65 (+)   .. ro CHF.  echo 8/19/2022 dd1 pasp 58 n rvsf   check bnp  .. RO MI.   ekg 11/4/2022 irbb septal infarct age undetermined septal infarct not seen 10/18/2022   11/12/2022 check ekg trop   GI.  .. Nutrition.  11/12/2022 jevity 30/h   .. elevated lfts.  LFTS 11/12/2022   ap 145  ast 18  alt 15   .. constipat.  11/12/2022 miralax   11/12/2022 senna   HEMAT.  .. DVT pplx.   11/12/2022 hpsc  .. anemia.  Hb 11/12/2022 Hb 10.3   mcv 11/12/2022 mcv 90   inr 11/12/2022 inr 124   monitor   RENAL.  .. renal parameters.  Na 11/12/2022 Na 138  K 11/12/2022 K 5.3   co2 11/12/2022 co2 30   Cr 11/12/2022 Cr 1.1   monitor  IV fl.  SKIN  .. Skin break down.  11/12/2022 Dakins   ENDOCRINE.   .. DM.  11/12/2022 Insulin 8 u q a   11/12/2022 riss     TIME SPENT   Over 55 minutes aggregate critical care time spent on encounter; activities included   direct patient care, counseling and/or coordinating care reviewing notes, lab data/ imaging , discussion with multidisciplinary team/ patient  /family and explaining in detail risks, benefits, alternatives  of the recommendations     CHAPINCITO GUIDRY 76 f [address] S 11/12/2022   DR JASMEET HEARD

## 2022-11-12 NOTE — ED PROVIDER NOTE - CARE PLAN
1 Principal Discharge DX:	Pneumonia  Secondary Diagnosis:	Sepsis  Secondary Diagnosis:	Decubitus ulcer  Secondary Diagnosis:	Suspected UTI  Secondary Diagnosis:	H/O tracheostomy

## 2022-11-12 NOTE — CONSULT NOTE ADULT - ASSESSMENT
The patient is a 76 year old female with a history of HTN, DM, CVA, dementia, chronic respiratory failure s/p trach, PEG, cardiac arrest, anemia, pleural effusions who presents with fever and respiratory distress.    Plan:  - ECG with no evidence of ischemia or infarction  - Echo 8/22 with normal LV systolic function, mod pulm HTN, IVC small/collapsible  - CT chest with small bilateral pleural effusions, GGO, and infiltrates  - Pleural effusions previously were exudative, likely parapneumonic  - Continue midodrine 10 mg tid  - IV antibiotics  - Seen by pulmonary 77 y/o female Seen at [facility] ER  History of HTN, DM, CVA, dementia, chronic respiratory failure s/p trach, PEG, cardiac arrest, anemia, pleural effusions who presents with fever and respiratory distress.    Plan:  - ECG with no evidence of ischemia or infarction  - Echo 8/22 with normal LV systolic function, mod pulm HTN, IVC small/collapsible  - CT chest with small bilateral pleural effusions, GGO, and infiltrates  - Pleural effusions previously were exudative, likely parapneumonic  - Continue midodrine 10 mg tid  - IV antibiotics  - Seen by pulmonary

## 2022-11-12 NOTE — CONSULT NOTE ADULT - ASSESSMENT
76 year old female with PMHx of dementia, COPD with chronic respiratory failure s/p trach, cardiac arrest, CHH, quadriplegia, CVA, CKD, anemia, PEG tube,    ckd  chr resp failure  cva  sepsis eval  card arrest hx  anoxic injury  dysphagia  peg      HCP    Marciano  pt is full code  all measures  long term resident at SNF [facility]

## 2022-11-12 NOTE — ED ADULT NURSE NOTE - OBJECTIVE STATEMENT
BIB ambulance for difficulty breathing "all day". Presents labored, tachypneic, diaphoretic. Vent to trach settings 18/400/100%, PEEP 5. #16 Woody to SDB inserted as per Dr. Paulino. U/A, urine c/s sent to lab. Mid line IV access observed to right upper inner arm. Unable to draw blood from site. MD aware. GTT in place draining dark fluids. Pt turned and repositioned. Unstageable decubiti observed to upper coccyx. Tip of left great toe missing w/ closed wound.

## 2022-11-12 NOTE — ED PROVIDER NOTE - OBJECTIVE STATEMENT
76 year old female with PMHx of dementia, COPD with chronic respiratory failure s/p trach, cardiac arrest, CHH, quadriplegia, CVA, CKD, anemia, PEG tube, and multiple hospital admissions for respiratory distress 76 year old female with PMHx of dementia, COPD with chronic respiratory failure s/p trach, cardiac arrest, CHH, quadriplegia, CVA, CKD, anemia, PEG tube, and multiple hospital admissions for respiratory distress brought in by EMS from [facility] for respiratory distress "all day" as per nursing facility reported by EMS.  On chronic vent. Has a MOST full code. No meds given prior to arrival by EMS

## 2022-11-12 NOTE — PROVIDER CONTACT NOTE (EICU) - BACKGROUND
76 y. F with Hx of dementia, COPD with chronic respiratory failure s/p trach, cardiac arrest,  quadriplegia, CVA, CKD, anemia, PEG tube, and multiple hospital admissions for respiratory distress ( discharged to NH 2 days earlier with ABX for VAP);  BIBEMS from [facility]  for shortness of breath, low grade fever,  leukocytosis WBC 16, malfunction of IV access- midline preventing pt from receiving ABX at NH.   Pt readmitted  to ICU on chronic vent  with suspicion of sepsis 2/2 unresolved PNA. Received VAnco/ Zosyn in ER

## 2022-11-13 LAB
ALBUMIN SERPL ELPH-MCNC: 1.8 G/DL — LOW (ref 3.3–5)
ALP SERPL-CCNC: 106 U/L — SIGNIFICANT CHANGE UP (ref 30–120)
ALT FLD-CCNC: 13 U/L DA — SIGNIFICANT CHANGE UP (ref 10–60)
ANION GAP SERPL CALC-SCNC: 6 MMOL/L — SIGNIFICANT CHANGE UP (ref 5–17)
AST SERPL-CCNC: 28 U/L — SIGNIFICANT CHANGE UP (ref 10–40)
BILIRUB SERPL-MCNC: 0.3 MG/DL — SIGNIFICANT CHANGE UP (ref 0.2–1.2)
BUN SERPL-MCNC: 24 MG/DL — HIGH (ref 7–23)
CALCIUM SERPL-MCNC: 8.4 MG/DL — SIGNIFICANT CHANGE UP (ref 8.4–10.5)
CHLORIDE SERPL-SCNC: 103 MMOL/L — SIGNIFICANT CHANGE UP (ref 96–108)
CO2 SERPL-SCNC: 30 MMOL/L — SIGNIFICANT CHANGE UP (ref 22–31)
CREAT SERPL-MCNC: 1.27 MG/DL — SIGNIFICANT CHANGE UP (ref 0.5–1.3)
CULTURE RESULTS: SIGNIFICANT CHANGE UP
EGFR: 43 ML/MIN/1.73M2 — LOW
GLUCOSE BLDC GLUCOMTR-MCNC: 101 MG/DL — HIGH (ref 70–99)
GLUCOSE BLDC GLUCOMTR-MCNC: 107 MG/DL — HIGH (ref 70–99)
GLUCOSE BLDC GLUCOMTR-MCNC: 110 MG/DL — HIGH (ref 70–99)
GLUCOSE BLDC GLUCOMTR-MCNC: 136 MG/DL — HIGH (ref 70–99)
GLUCOSE SERPL-MCNC: 124 MG/DL — HIGH (ref 70–99)
GRAM STN FLD: SIGNIFICANT CHANGE UP
HCT VFR BLD CALC: 25.5 % — LOW (ref 34.5–45)
HCT VFR BLD CALC: 26.3 % — LOW (ref 34.5–45)
HGB BLD-MCNC: 7.8 G/DL — LOW (ref 11.5–15.5)
HGB BLD-MCNC: 8 G/DL — LOW (ref 11.5–15.5)
INR BLD: 1.38 RATIO — HIGH (ref 0.88–1.16)
LACTATE SERPL-SCNC: 1.5 MMOL/L — SIGNIFICANT CHANGE UP (ref 0.7–2)
MCHC RBC-ENTMCNC: 27.2 PG — SIGNIFICANT CHANGE UP (ref 27–34)
MCHC RBC-ENTMCNC: 27.3 PG — SIGNIFICANT CHANGE UP (ref 27–34)
MCHC RBC-ENTMCNC: 30.4 GM/DL — LOW (ref 32–36)
MCHC RBC-ENTMCNC: 30.6 GM/DL — LOW (ref 32–36)
MCV RBC AUTO: 89.2 FL — SIGNIFICANT CHANGE UP (ref 80–100)
MCV RBC AUTO: 89.5 FL — SIGNIFICANT CHANGE UP (ref 80–100)
MRSA PCR RESULT.: SIGNIFICANT CHANGE UP
NRBC # BLD: 0 /100 WBCS — SIGNIFICANT CHANGE UP (ref 0–0)
NRBC # BLD: 0 /100 WBCS — SIGNIFICANT CHANGE UP (ref 0–0)
NT-PROBNP SERPL-SCNC: HIGH PG/ML (ref 0–450)
ORGANISM # SPEC MICROSCOPIC CNT: SIGNIFICANT CHANGE UP
PHOSPHATE SERPL-MCNC: 2.6 MG/DL — SIGNIFICANT CHANGE UP (ref 2.5–4.5)
PLATELET # BLD AUTO: 174 K/UL — SIGNIFICANT CHANGE UP (ref 150–400)
PLATELET # BLD AUTO: 175 K/UL — SIGNIFICANT CHANGE UP (ref 150–400)
POTASSIUM SERPL-MCNC: 4.3 MMOL/L — SIGNIFICANT CHANGE UP (ref 3.5–5.3)
POTASSIUM SERPL-SCNC: 4.3 MMOL/L — SIGNIFICANT CHANGE UP (ref 3.5–5.3)
PROCALCITONIN SERPL-MCNC: 1.91 NG/ML — HIGH (ref 0.02–0.1)
PROT SERPL-MCNC: 6.6 G/DL — SIGNIFICANT CHANGE UP (ref 6–8.3)
PROTHROM AB SERPL-ACNC: 15.9 SEC — HIGH (ref 10.5–13.4)
RBC # BLD: 2.86 M/UL — LOW (ref 3.8–5.2)
RBC # BLD: 2.94 M/UL — LOW (ref 3.8–5.2)
RBC # FLD: 16.5 % — HIGH (ref 10.3–14.5)
RBC # FLD: 16.9 % — HIGH (ref 10.3–14.5)
S AUREUS DNA NOSE QL NAA+PROBE: SIGNIFICANT CHANGE UP
SODIUM SERPL-SCNC: 139 MMOL/L — SIGNIFICANT CHANGE UP (ref 135–145)
SPECIMEN SOURCE: SIGNIFICANT CHANGE UP
SPECIMEN SOURCE: SIGNIFICANT CHANGE UP
TROPONIN I, HIGH SENSITIVITY RESULT: 27.9 NG/L — SIGNIFICANT CHANGE UP
WBC # BLD: 7.16 K/UL — SIGNIFICANT CHANGE UP (ref 3.8–10.5)
WBC # BLD: 9.05 K/UL — SIGNIFICANT CHANGE UP (ref 3.8–10.5)
WBC # FLD AUTO: 7.16 K/UL — SIGNIFICANT CHANGE UP (ref 3.8–10.5)
WBC # FLD AUTO: 9.05 K/UL — SIGNIFICANT CHANGE UP (ref 3.8–10.5)

## 2022-11-13 PROCEDURE — 99233 SBSQ HOSP IP/OBS HIGH 50: CPT

## 2022-11-13 RX ORDER — SODIUM CHLORIDE 9 MG/ML
1000 INJECTION, SOLUTION INTRAVENOUS
Refills: 0 | Status: DISCONTINUED | OUTPATIENT
Start: 2022-11-13 | End: 2022-11-15

## 2022-11-13 RX ORDER — CHLORHEXIDINE GLUCONATE 213 G/1000ML
1 SOLUTION TOPICAL
Refills: 0 | Status: DISCONTINUED | OUTPATIENT
Start: 2022-11-13 | End: 2022-11-16

## 2022-11-13 RX ADMIN — Medication 1 APPLICATION(S): at 17:33

## 2022-11-13 RX ADMIN — Medication 1 MILLIGRAM(S): at 14:10

## 2022-11-13 RX ADMIN — Medication 2 MILLIGRAM(S): at 21:49

## 2022-11-13 RX ADMIN — Medication 1 MILLIGRAM(S): at 05:56

## 2022-11-13 RX ADMIN — Medication 1 GRAM(S): at 17:32

## 2022-11-13 RX ADMIN — MIDODRINE HYDROCHLORIDE 10 MILLIGRAM(S): 2.5 TABLET ORAL at 21:11

## 2022-11-13 RX ADMIN — HEPARIN SODIUM 5000 UNIT(S): 5000 INJECTION INTRAVENOUS; SUBCUTANEOUS at 17:32

## 2022-11-13 RX ADMIN — Medication 2 MILLIGRAM(S): at 02:34

## 2022-11-13 RX ADMIN — SIMETHICONE 80 MILLIGRAM(S): 80 TABLET, CHEWABLE ORAL at 17:34

## 2022-11-13 RX ADMIN — Medication 1 TABLET(S): at 06:12

## 2022-11-13 RX ADMIN — POLYETHYLENE GLYCOL 3350 17 GRAM(S): 17 POWDER, FOR SOLUTION ORAL at 17:32

## 2022-11-13 RX ADMIN — Medication 1 MILLIGRAM(S): at 21:11

## 2022-11-13 RX ADMIN — NYSTATIN CREAM 1 APPLICATION(S): 100000 CREAM TOPICAL at 05:55

## 2022-11-13 RX ADMIN — NYSTATIN CREAM 1 APPLICATION(S): 100000 CREAM TOPICAL at 17:31

## 2022-11-13 RX ADMIN — HEPARIN SODIUM 5000 UNIT(S): 5000 INJECTION INTRAVENOUS; SUBCUTANEOUS at 05:55

## 2022-11-13 RX ADMIN — Medication 2 TABLET(S): at 11:46

## 2022-11-13 RX ADMIN — Medication 1 APPLICATION(S): at 05:56

## 2022-11-13 RX ADMIN — SIMETHICONE 80 MILLIGRAM(S): 80 TABLET, CHEWABLE ORAL at 23:33

## 2022-11-13 RX ADMIN — Medication 1 GRAM(S): at 23:33

## 2022-11-13 RX ADMIN — Medication 1 GRAM(S): at 11:46

## 2022-11-13 RX ADMIN — PANTOPRAZOLE SODIUM 40 MILLIGRAM(S): 20 TABLET, DELAYED RELEASE ORAL at 11:46

## 2022-11-13 RX ADMIN — NYSTATIN CREAM 1 APPLICATION(S): 100000 CREAM TOPICAL at 21:12

## 2022-11-13 RX ADMIN — SIMETHICONE 80 MILLIGRAM(S): 80 TABLET, CHEWABLE ORAL at 01:10

## 2022-11-13 RX ADMIN — SIMETHICONE 80 MILLIGRAM(S): 80 TABLET, CHEWABLE ORAL at 05:55

## 2022-11-13 RX ADMIN — Medication 1 MILLIGRAM(S): at 17:32

## 2022-11-13 RX ADMIN — MEROPENEM 100 MILLIGRAM(S): 1 INJECTION INTRAVENOUS at 17:31

## 2022-11-13 RX ADMIN — Medication 1 MILLIGRAM(S): at 09:59

## 2022-11-13 RX ADMIN — ALBUTEROL 2 PUFF(S): 90 AEROSOL, METERED ORAL at 19:25

## 2022-11-13 RX ADMIN — SIMETHICONE 80 MILLIGRAM(S): 80 TABLET, CHEWABLE ORAL at 11:46

## 2022-11-13 RX ADMIN — Medication 1 APPLICATION(S): at 05:55

## 2022-11-13 RX ADMIN — Medication 2 MILLIGRAM(S): at 12:05

## 2022-11-13 RX ADMIN — Medication 1 APPLICATION(S): at 17:31

## 2022-11-13 RX ADMIN — Medication 1 APPLICATION(S): at 23:33

## 2022-11-13 RX ADMIN — Medication 325 MILLIGRAM(S): at 11:47

## 2022-11-13 RX ADMIN — SODIUM CHLORIDE 50 MILLILITER(S): 9 INJECTION, SOLUTION INTRAVENOUS at 23:45

## 2022-11-13 RX ADMIN — MEROPENEM 100 MILLIGRAM(S): 1 INJECTION INTRAVENOUS at 05:57

## 2022-11-13 RX ADMIN — CHLORHEXIDINE GLUCONATE 15 MILLILITER(S): 213 SOLUTION TOPICAL at 17:31

## 2022-11-13 RX ADMIN — Medication 1 GRAM(S): at 05:55

## 2022-11-13 RX ADMIN — Medication 1 APPLICATION(S): at 12:14

## 2022-11-13 RX ADMIN — SENNA PLUS 3 TABLET(S): 8.6 TABLET ORAL at 21:11

## 2022-11-13 RX ADMIN — MIDODRINE HYDROCHLORIDE 10 MILLIGRAM(S): 2.5 TABLET ORAL at 05:57

## 2022-11-13 RX ADMIN — CHLORHEXIDINE GLUCONATE 15 MILLILITER(S): 213 SOLUTION TOPICAL at 05:56

## 2022-11-13 RX ADMIN — ALBUTEROL 2 PUFF(S): 90 AEROSOL, METERED ORAL at 06:37

## 2022-11-13 RX ADMIN — Medication 1 MILLIGRAM(S): at 02:06

## 2022-11-13 RX ADMIN — Medication 1 TABLET(S): at 11:46

## 2022-11-13 RX ADMIN — MIDODRINE HYDROCHLORIDE 10 MILLIGRAM(S): 2.5 TABLET ORAL at 14:10

## 2022-11-13 RX ADMIN — Medication 1 GRAM(S): at 01:10

## 2022-11-13 RX ADMIN — Medication 1 TABLET(S): at 17:32

## 2022-11-13 RX ADMIN — Medication 1 APPLICATION(S): at 11:45

## 2022-11-13 NOTE — PROGRESS NOTE ADULT - ASSESSMENT
77 y/o female Seen at [facility] ER  History of HTN, DM, CVA, dementia, chronic respiratory failure s/p trach, PEG, cardiac arrest, anemia, pleural effusions who presents with fever and respiratory distress.    11/13/22  Seen at [facility] ICU  Lying flat, sleeping comfortably    Plan:  - ECG with no evidence of ischemia or infarction  - Echo 8/22 with normal LV systolic function, mod pulm HTN, IVC small/collapsible  - CT chest with small bilateral pleural effusions, GGO, and infiltrates  - Pleural effusions previously were exudative, likely parapneumonic  - Continue midodrine 10 mg tid  - On meropenem  - Seen by pulmonary, ID, palliative care

## 2022-11-13 NOTE — PROGRESS NOTE ADULT - ASSESSMENT
76 year old female with PMHx of dementia, COPD with chronic respiratory failure s/p trach, cardiac arrest, CHH, quadriplegia, CVA, CKD, anemia, PEG tube, and multiple hospital admissions for respiratory distress presents to the ED BIBEMS from [facility] for shortness of breath, found ot have low grade fever, midline malfunction, and leukocytosis to 16, concerning for sepsis

## 2022-11-13 NOTE — DIETITIAN INITIAL EVALUATION ADULT - NSFNSGIIOFT_GEN_A_CORE
11-12-22 @ 07:01  -  11-13-22 @ 07:00  --------------------------------------------------------  OUT:  Total OUT: 0 mL    Total NET: 300 mL

## 2022-11-13 NOTE — DIETITIAN INITIAL EVALUATION ADULT - ADD RECOMMEND
1) Defer initiation of TF to medical team 2) Monitor GI tolerance to feeds when/if initiated, RD to remain available to adjust EN formulary, volume/rate as needed/upon request.

## 2022-11-13 NOTE — PROGRESS NOTE ADULT - ASSESSMENT
REVIEW OF SYMPTOMS      Able to give (reliable) ROS  NO     PHYSICAL EXAM    HEENT Unremarkable  atraumatic   RESP Fair air entry EXP prolonged    Harsh breath sound Resp distres mild   CARDIAC S1 S2 No S3     NO JVD    ABDOMEN SOFT BS PRESENT NOT DISTENDED No hepatosplenomegaly   PEDAL EDEMA present No calf tenderness  NO rash       GENERAL DATA .   GOC.  11/12/2022 full code        ALLGY. codeine                            WT.  11/12/2022 57  BMI.   11/12/2022 20                           ICU STAY. 11/12/2022  COVID. 11/12/2022 scv2 (-)      PROCEDURES.  11/12/2022 IO in ER  11/12 reid io monitor     BEST PRACTICE ISSUES.    HOB ELEVATN. Yes  DVT PPLX.  11/12/2022 hosc     SQUIRES PPLX.  11/12/2022 protonix 40     11/12/2022 carafate   INFN PPLX.  11/12/2022 chlorhexidine .12%   SP SW EM.        DIET.  11/13/2022 npo     VS/ PO/IO/ VENT/ DRIPS.   11/13/2022 55 100/49   11/13/2022 18/400/5/.5     OVERALL ASSESSMENT/DISPOSITION.  76 f PMH trach peg cva cac anoxic encephalo PH 8/19/2022 pasp 58 bl pl effs  r thora 6/8/2022 and l thora 5/25/2022 were lp exudates  recurrent hospitalizations HO CR psuedomonas 5/2/2022 zosyn 8/19/2022  recent admission 10/18-11/2/2022 uc 10/18 100K E coli  sp 10/19 Stenotrophomonas  10/19-10/26/2022 levaquin 750  10/21/2022 rocephin x 7d Dr BRITTANY Brantley readmitted recently  11/4-11/10/2022 with fever trach 11/5 stenotrophomonas   uc 11/4 vr enterococc 50-99 candida   11/4/2022 levaquin 750 dced 11/7 doxy  11/8 bactrim 250.3    Pt sent back from [facility] 11/12/2022 with resp distress /70 p 119 r 36 T 100 po 93 on 100% GCS 10 leukocytosis w 15   Pulm crit care consulted      PROBLEMS.  Vent dependent   Trach poa 11/12/2022    VAP 11/12/2022  Decub ulcers   Meropenem 11/12 7d  Bactrim bid 11/12      COPD    Low BP 11/12/2022     peg poa 11/12/2022   GT leak 11/13/2022  -> GI calld     ANEMIA Hb 11/12-11/13/2022 Hb 10.3 - 7.8     AGITATION 11/12       ASSESSMENT/RECOMMENDATIONS .   RESP.  .. Gas exchange.  Monitor & target po 90-95%  11/12/2022 check abg   .. RO VTE.  V duplx legs 11/4 (-)   11/12/2022  venous duplx (-)   .. COPD.  11/12/2022 albuterol 2px2   11/12/2022 duoneb.4   cont rx  INFECTION.  .. SCV2 status.  Scv2 11/12/2022 scv2 (-)   .. VAP   w 11/12-11/13/2022 w 15.4 - 7.1   ua 11/12/2022 w 6-10   cxr 11/12/2022 diffuse infiltr nsc 11/8   rvp 11/12/2022 (-)   11/12 meropenem 1.2 x 7d   11/12 bactrim bid   CARDIAC.  .. Hemodynamics.   .. low bp.  la 11/12/2022 la 1.8   11/12/2022 midodrine 10.3   target map 65 (+)   .. ro CHF  bnp 11/13/2022 bnp [id number]  echo 8/19/2022 dd1 pasp 58 n rvsf   optimize volume state   .. RO MI.   ekg 11/4/2022 irbb septal infarct age undetermined septal infarct not seen 10/18/2022   11/12/2022 check ekg trop   GI.  .. Nutrition.  11/13/2022 made npo as gt leak  .. elevated lfts.  LFTS 11/12/2022   ap 145  ast 18  alt 15   .. constipat.  11/12/2022 miralax   11/12/2022 senna   .. GT LEAK 11/13/2022 11/13/2022 GI consulted   11/13/2022 made npo  HEMAT.  .. DVT pplx.   11/12/2022 hpsc  .. anemia.  Hb 11/12-11/13/2022 Hb 10.3 - 7.8   mcv 11/12/2022 mcv 90   inr 11/12/2022 inr 124   11/13/2022 Droopped Hb   Will check stat   monitor   RENAL.  .. renal parameters.  Na 11/12/2022 Na 138  K 11/12/2022 K 5.3   co2 11/12/2022 co2 30   Cr 11/12/2022 Cr 1.1   monitor  .. REID  11/12 for io monitoring  IV fl.  11/13/2022 lr 50   SKIN  .. Skin break down.  11/12/2022 Dakins   ENDOCRINE.   .. DM.  11/12/2022 Insulin 8 u q a   11/12/2022 riss   NEURO.  .. AGITATION.  11/12 lorazepam 2.4p     TIME SPENT   Over 39 minutes aggregate critical care time spent on encounter; activities included   direct patient care, counseling and/or coordinating care reviewing notes, lab data/ imaging , discussion with multidisciplinary team/ patient  /family and explaining in detail risks, benefits, alternatives  of the recommendations     CHAPINCITO GUIDRY 76 f [address] S 11/12/2022   DR JASMEET HEARD

## 2022-11-13 NOTE — DIETITIAN INITIAL EVALUATION ADULT - ORAL INTAKE PTA/DIET HISTORY
Pt visited at bedside in SPCU, with trach and vent dependent unable to provide nutrition related hx. Pt previously admitted from (11/4-11/10), presents form [facility], per transfer documents pt NPO with TF receiving Glucerna 1.5 at goal rate of 60ml/hr over 24hrs with 250 ml free water q6hr before/after feeds + auto flush of 25ml. No food allergies documented and per H&P pt taking multivitamin PTA.  Pt visited at bedside in SPCU, with trach and vent dependent unable to provide nutrition related hx. Pt previously admitted from (11/4-11/10), presents from [facility], per transfer documents pt NPO with TF receiving Glucerna 1.5 at goal rate of 60ml/hr over 24hrs with 250 ml free water q6hr before/after feeds + auto flush of 25ml. No food allergies documented and per H&P pt taking multivitamin PTA.

## 2022-11-13 NOTE — PROGRESS NOTE ADULT - ASSESSMENT
76 year old female with PMHx of dementia, COPD with chronic respiratory failure s/p trach, cardiac arrest, CHH, quadriplegia, CVA, CKD, anemia, PEG tube,    ckd  chr resp failure  cva  sepsis eval  card arrest hx  anoxic injury  dysphagia  peg    on MARTA  on IVF  VS noted      HCP    Marciano  pt is full code  all measures  long term resident at SNF [facility]

## 2022-11-13 NOTE — DIETITIAN INITIAL EVALUATION ADULT - NSFNSPHYEXAMSKINFT_GEN_A_CORE
Pressure Injury 1: Right:,first toe, Unstageable  Pressure Injury 2: Left:,first toe, Unstageable  Pressure Injury 3: Left:,heel, Stage I  Pressure Injury 4: Right:,heel, Stage I  Pressure Injury 5: Right:,malleolus, Stage II,healing- small scab in center with non blanchable redness surrounding, appears to be scarring  Pressure Injury 6: Right:,fifth toe, Unstageable  Pressure Injury 7: sacrum, Stage II,healing stage 2, small open skin in center surrounded by nonblanchable erythema  Pressure Injury 8: Left:,gluteal fold, Stage IV  Pressure Injury 9: none, none  Pressure Injury 10: none, none  Pressure Injury 11: none, none

## 2022-11-13 NOTE — DIETITIAN INITIAL EVALUATION ADULT - REASON FOR ADMISSION
As per H&P "The pt is a 76 year old female with PMHx of dementia, COPD with chronic respiratory failure s/p trach, cardiac arrest, CHH, quadriplegia, CVA, CKD, anemia, PEG tube, and multiple hospital admissions for respiratory distress presents to the ED BIBEMS from [facility] for shortness of breath, found ot have low grade fever, midline malfunction, and leukocytosis to 16, fever, concerning for sepsis.  Patient was discharged 2 days ago, but developed this morning increased tachypnea, respiratory distress and it appeared her midline was no longer working, and patient might not be able to receive the antibiotics bactrim and doxycycline which had been prescribed for her to continue in outpt setting after discharge form the hospital."

## 2022-11-13 NOTE — PROGRESS NOTE ADULT - PROBLEM SELECTOR PLAN 1
Pt has signs of sepsis possibly secondary to unresolved pneumonia, ventilator associated pneumonia  - recently admitted for VAP, was treated with vanco zosyn, changed to doxy and bactrim, discharged just 2 days ago with abx via picc line  - care d/w RN yesterday, picc line is flushing fine, unclear why pt's WBC increased, care was d/w ID, and pt was started on meropenem yesterday; WBC downtrended significantly from 15 to 7 today  - cont to f/u ID Dr. Chairez recs  - will f/u cultures  - cxr shows unchanged b/l infiltrates and irght pleural effusion, f/u pulm recs, cont vent  - monitor in SPCU Pt has signs of sepsis possibly secondary to unresolved pneumonia, ventilator associated pneumonia  - recently admitted for VAP, was treated with vanco zosyn, changed to doxy and bactrim, discharged just 2 days ago with abx via picc line  - care d/w RN yesterday, picc line is flushing fine, unclear why pt's WBC increased, care was d/w ID, and pt was started on meropenem yesterday; WBC downtrended significantly from 15 to 7 today  - cont to f/u ID Dr. Chairez recs  - will f/u cultures  - cxr shows unchanged b/l infiltrates and irght pleural effusion, f/u pulm recs, cont vent  - monitor in SPCU  - palliative following, pt remains full code, ongoing discussions with  Pt has signs of sepsis possibly secondary to unresolved pneumonia, ventilator associated pneumonia  - recently admitted for VAP, was treated with vanco zosyn, changed to doxy and bactrim, discharged just 2 days ago with abx via midline  - care d/w RN yesterday, midline is flushing fine, unclear why pt's WBC increased, care was d/w ID, and pt was started on meropenem yesterday; WBC downtrended significantly from 15 to 7 today  - cont to f/u ID Dr. Chairez recs  - will f/u cultures  - cxr shows unchanged b/l infiltrates and irght pleural effusion, f/u pulm recs, cont vent  - monitor in SPCU  - palliative following, pt remains full code, ongoing discussions with

## 2022-11-13 NOTE — DIETITIAN INITIAL EVALUATION ADULT - ENTERAL
When medically feasible recommend Pulmocare 1.5--iniatiate at 20ml/hr and advance 10ml/hr q8hrs until goal rate of 40ml/hr (24hrs) to provide 960 ml of formula, 1440 kcal, 60 gm of protein and 758 ml of free water, additional water to MD discretion. Also, recommend  adding Lucas x1 packet a day (provides 14 gm AA) to aid healing in setting of pressure injuries.

## 2022-11-13 NOTE — DIETITIAN INITIAL EVALUATION ADULT - PERTINENT LABORATORY DATA
11-13    139  |  103  |  24<H>  ----------------------------<  124<H>  4.3   |  30  |  1.27    Ca    8.4      13 Nov 2022 06:48  Phos  2.6     11-13    TPro  6.6  /  Alb  1.8<L>  /  TBili  0.3  /  DBili  x   /  AST  28  /  ALT  13  /  AlkPhos  106  11-13  POCT Blood Glucose.: 136 mg/dL (11-13-22 @ 05:53)  A1C with Estimated Average Glucose Result: 8.5 % (10-19-22 @ 09:05)  A1C with Estimated Average Glucose Result: 7.3 % (08-19-22 @ 06:36)  A1C with Estimated Average Glucose Result: 6.4 % (04-22-22 @ 12:14)

## 2022-11-13 NOTE — DIETITIAN INITIAL EVALUATION ADULT - OTHER INFO
Weight: Upon admission pt with a wt of 125.6 lbs (11/12) consistent with transfer documents. Daily wt of 123.6 (11/13), continue to obtain weights to monitor trends.     Pt seen for nutrition assessment secondary to referral. Pt re-admitted for respiratory distressed, pt presently NPO due to leaking around PEG tube, per documents pt with multiple changes of PEG and may require secondary site for PEG tube placement, GI consulted. Pt with hx of T2DM with HbA1c of 8.5 % (10/19) on glargine prior to admission, presently on insulin regimen in-house.     Defer initiation of TF to medical teams, due to respiratory distress when medically feasible recommend Pulmocare 1.5--iniatiate at 20ml/hr and advance 10ml/hr q8hrs until goal rate of 40ml/hr (24hrs) to provide 960 ml of formula, 1440 kcal, 60 gm of protein and 758 ml of free water, additional water to MD discretion. Also, recommend  adding Lucas x1 packet a day (provides 14 gm AA) to aid healing in setting of pressure injuries. Continue to monitor BG for needs of Glucerna formula. RD to follow up to continue to monitor pt's nutrition status and will remain available to adjust EN formulary, volume/rate as needed/upon request.  Weight: Upon admission pt with a wt of 125.6 lbs (11/12) consistent with transfer documents. Daily wt of 123.6 (11/13), continue to obtain weights to monitor trends.     Pt seen for nutrition assessment secondary to referral. Pt re-admitted for respiratory distressed, pt presently NPO due to leaking around PEG tube, per documents pt with multiple changes of PEG and may require secondary site for PEG tube placement, GI consulted. Pt with hx of T2DM with HbA1c of 8.5 % (10/19) on glargine prior to admission, presently on insulin regimen in-house.     Defer initiation of tube feeds to medical team. In setting of respiratory distress when medically feasible recommend Pulmocare 1.5--iniatiate at 20ml/hr and advance 10ml/hr q8hrs until goal rate of 40ml/hr (24hrs) to provide 960 ml of formula, 1440 kcal, 60 gm of protein and 758 ml of free water, additional water to MD discretion. Also, recommend  adding Lucas x1 packet a day (provides 14 gm AA) to aid healing in setting of pressure injuries. Continue to monitor BG for need of Glucerna formula. RD to follow up to continue to monitor pt's nutrition status and will remain available to adjust EN formulary, volume/rate as needed/upon request.  Weight: Upon admission pt with a wt of 125.6 lbs (11/12) consistent with transfer documents. Daily wt of 123.6 (11/13), continue to obtain weights to monitor trends.     Pt seen for nutrition assessment secondary to referral. Pt re-admitted for respiratory distress, pt presently NPO due to leaking around PEG tube, per documents pt with multiple changes of PEG and may require secondary site for PEG tube placement, GI consulted. Pt with hx of T2DM with HbA1c of 8.5 % (10/19) on glargine prior to admission, presently on insulin regimen in-house.     Defer initiation of tube feeds to medical team. In setting of respiratory distress when medically feasible recommend Pulmocare 1.5--iniatiate at 20ml/hr and advance 10ml/hr q8hrs until goal rate of 40ml/hr (24hrs) to provide 960 ml of formula, 1440 kcal, 60 gm of protein and 758 ml of free water, additional water to MD discretion. Also, recommend  adding Lucas x1 packet a day (provides 14 gm AA) to aid healing in setting of pressure injuries. Continue to monitor BG for need of Glucerna formula. RD to follow up to continue to monitor pt's nutrition status and will remain available to adjust EN formulary, volume/rate as needed/upon request.

## 2022-11-13 NOTE — DIETITIAN INITIAL EVALUATION ADULT - PERTINENT MEDS FT
MEDICATIONS  (STANDING):  albuterol    90 MICROgram(s) HFA Inhaler 2 Puff(s) Inhalation two times a day  bacitracin   Ointment 1 Application(s) Topical daily  chlorhexidine 0.12% Liquid 15 milliLiter(s) Oral Mucosa every 12 hours  Dakins Solution - Full Strength 1 Application(s) Topical two times a day  dextrose 5%. 1000 milliLiter(s) (50 mL/Hr) IV Continuous <Continuous>  dextrose 5%. 1000 milliLiter(s) (100 mL/Hr) IV Continuous <Continuous>  dextrose 50% Injectable 25 Gram(s) IV Push once  dextrose 50% Injectable 12.5 Gram(s) IV Push once  dextrose 50% Injectable 25 Gram(s) IV Push once  ferrous    sulfate 325 milliGRAM(s) Oral daily  glucagon  Injectable 1 milliGRAM(s) IntraMuscular once  heparin   Injectable 5000 Unit(s) SubCutaneous every 12 hours  insulin glargine Injectable (LANTUS) 8 Unit(s) SubCutaneous every morning  insulin lispro (ADMELOG) corrective regimen sliding scale   SubCutaneous every 6 hours  lactated ringers. 1000 milliLiter(s) (50 mL/Hr) IV Continuous <Continuous>  lactobacillus acidophilus 2 Tablet(s) Oral daily  LORazepam     Tablet 1 milliGRAM(s) Oral every 4 hours  meropenem  IVPB      meropenem  IVPB 1000 milliGRAM(s) IV Intermittent every 12 hours  midodrine 10 milliGRAM(s) Oral every 8 hours  mineral oil/petrolatum Hydrophilic Ointment 1 Application(s) Topical daily  multivitamin 1 Tablet(s) Oral daily  nystatin Powder 1 Application(s) Topical three times a day  pantoprazole  Injectable 40 milliGRAM(s) IV Push daily  polyethylene glycol 3350 17 Gram(s) Oral every 12 hours  povidone iodine 10% Solution 1 Application(s) Topical three times a day  senna 3 Tablet(s) Oral at bedtime  simethicone 80 milliGRAM(s) Chew every 6 hours  sucralfate suspension 1 Gram(s) Oral four times a day  trimethoprim   80 mG/sulfamethoxazole 400 mG 1 Tablet(s) Oral two times a day    MEDICATIONS  (PRN):  acetaminophen     Tablet .. 650 milliGRAM(s) Oral every 6 hours PRN Temp greater or equal to 38C (100.4F), Mild Pain (1 - 3)  dextrose Oral Gel 15 Gram(s) Oral once PRN Blood Glucose LESS THAN 70 milliGRAM(s)/deciliter  LORazepam   Injectable 2 milliGRAM(s) IV Push every 6 hours PRN Agitation

## 2022-11-13 NOTE — PROGRESS NOTE ADULT - ASSESSMENT
Pt is a 77 y/o F pmhx of dementia, COPD, chronic RF requiring trach and peg, cardiac arrest, quadriplegia, CVA, CKD w/ previous hospital admissions for respiratory distress presents to [facility] ED w/ complaints of SOB and low grade fever. Pt admitted to [facility] for sepsis in setting of possible VAP and started on Meropenem and bactrim.      1. Severe sepsis w/o shock   2. VAP   3. COPD  4. Acute hypoxic respiratory failure     Plan:     Neuro: At baseline, continue PO ativan for agitation. IV ativan for breakthrough.     CV: No active issues continue to monitor and maintain MAP >65. Continue 10 midodrine Q8 hr.    Pulm: Acute hypoxic respiratory failure in setting of VAP. Low TV ventilation using 4-6cc/kg of IBW for lung protective strategies, will titrate FiO2 to maintain SpO2>90%.     GI: Diet: NPO except meds, protonix for GI PPX     Renal: No active issues continue to monitor and avoid nephrotoxic meds.     Endo: ISS and lantus for Glucose <180, mag>2, K>4 for cardiac arrythmia suppression.     Heme: Heparin SQ for DVT PPX     ID: Sepsis in setting of suspected VAP, Continue on Meropenem and bactrim. WBC down trending, continue to trend.

## 2022-11-14 ENCOUNTER — TRANSCRIPTION ENCOUNTER (OUTPATIENT)
Age: 77
End: 2022-11-14

## 2022-11-14 DIAGNOSIS — N17.9 ACUTE KIDNEY FAILURE, UNSPECIFIED: ICD-10-CM

## 2022-11-14 LAB
ALBUMIN SERPL ELPH-MCNC: 1.7 G/DL — LOW (ref 3.3–5)
ALP SERPL-CCNC: 93 U/L — SIGNIFICANT CHANGE UP (ref 30–120)
ALT FLD-CCNC: <10 U/L DA — LOW (ref 10–60)
ANION GAP SERPL CALC-SCNC: 6 MMOL/L — SIGNIFICANT CHANGE UP (ref 5–17)
AST SERPL-CCNC: 21 U/L — SIGNIFICANT CHANGE UP (ref 10–40)
BILIRUB SERPL-MCNC: 0.6 MG/DL — SIGNIFICANT CHANGE UP (ref 0.2–1.2)
BUN SERPL-MCNC: 21 MG/DL — SIGNIFICANT CHANGE UP (ref 7–23)
CALCIUM SERPL-MCNC: 7.8 MG/DL — LOW (ref 8.4–10.5)
CHLORIDE SERPL-SCNC: 103 MMOL/L — SIGNIFICANT CHANGE UP (ref 96–108)
CO2 SERPL-SCNC: 30 MMOL/L — SIGNIFICANT CHANGE UP (ref 22–31)
CREAT SERPL-MCNC: 1.31 MG/DL — HIGH (ref 0.5–1.3)
CULTURE RESULTS: SIGNIFICANT CHANGE UP
EGFR: 42 ML/MIN/1.73M2 — LOW
GLUCOSE BLDC GLUCOMTR-MCNC: 112 MG/DL — HIGH (ref 70–99)
GLUCOSE BLDC GLUCOMTR-MCNC: 128 MG/DL — HIGH (ref 70–99)
GLUCOSE BLDC GLUCOMTR-MCNC: 80 MG/DL — SIGNIFICANT CHANGE UP (ref 70–99)
GLUCOSE BLDC GLUCOMTR-MCNC: 83 MG/DL — SIGNIFICANT CHANGE UP (ref 70–99)
GLUCOSE BLDC GLUCOMTR-MCNC: 99 MG/DL — SIGNIFICANT CHANGE UP (ref 70–99)
GLUCOSE SERPL-MCNC: 119 MG/DL — HIGH (ref 70–99)
HCT VFR BLD CALC: 25.2 % — LOW (ref 34.5–45)
HGB BLD-MCNC: 7.7 G/DL — LOW (ref 11.5–15.5)
MCHC RBC-ENTMCNC: 27.3 PG — SIGNIFICANT CHANGE UP (ref 27–34)
MCHC RBC-ENTMCNC: 30.6 GM/DL — LOW (ref 32–36)
MCV RBC AUTO: 89.4 FL — SIGNIFICANT CHANGE UP (ref 80–100)
NRBC # BLD: 0 /100 WBCS — SIGNIFICANT CHANGE UP (ref 0–0)
PLATELET # BLD AUTO: 155 K/UL — SIGNIFICANT CHANGE UP (ref 150–400)
POTASSIUM SERPL-MCNC: 4.2 MMOL/L — SIGNIFICANT CHANGE UP (ref 3.5–5.3)
POTASSIUM SERPL-SCNC: 4.2 MMOL/L — SIGNIFICANT CHANGE UP (ref 3.5–5.3)
PROT SERPL-MCNC: 6.4 G/DL — SIGNIFICANT CHANGE UP (ref 6–8.3)
RBC # BLD: 2.82 M/UL — LOW (ref 3.8–5.2)
RBC # FLD: 17.1 % — HIGH (ref 10.3–14.5)
SODIUM SERPL-SCNC: 139 MMOL/L — SIGNIFICANT CHANGE UP (ref 135–145)
SPECIMEN SOURCE: SIGNIFICANT CHANGE UP
WBC # BLD: 9.41 K/UL — SIGNIFICANT CHANGE UP (ref 3.8–10.5)
WBC # FLD AUTO: 9.41 K/UL — SIGNIFICANT CHANGE UP (ref 3.8–10.5)

## 2022-11-14 PROCEDURE — 99233 SBSQ HOSP IP/OBS HIGH 50: CPT

## 2022-11-14 RX ORDER — MINOCYCLINE HYDROCHLORIDE 45 MG/1
100 TABLET, EXTENDED RELEASE ORAL EVERY 12 HOURS
Refills: 0 | Status: DISCONTINUED | OUTPATIENT
Start: 2022-11-14 | End: 2022-11-14

## 2022-11-14 RX ORDER — VANCOMYCIN HCL 1 G
750 VIAL (EA) INTRAVENOUS ONCE
Refills: 0 | Status: COMPLETED | OUTPATIENT
Start: 2022-11-14 | End: 2022-11-14

## 2022-11-14 RX ORDER — VANCOMYCIN HCL 1 G
750 VIAL (EA) INTRAVENOUS EVERY 24 HOURS
Refills: 0 | Status: DISCONTINUED | OUTPATIENT
Start: 2022-11-15 | End: 2022-11-15

## 2022-11-14 RX ORDER — VANCOMYCIN HCL 1 G
VIAL (EA) INTRAVENOUS
Refills: 0 | Status: DISCONTINUED | OUTPATIENT
Start: 2022-11-15 | End: 2022-11-15

## 2022-11-14 RX ADMIN — Medication 1 APPLICATION(S): at 11:23

## 2022-11-14 RX ADMIN — ALBUTEROL 2 PUFF(S): 90 AEROSOL, METERED ORAL at 20:24

## 2022-11-14 RX ADMIN — Medication 30 MILLILITER(S): at 17:53

## 2022-11-14 RX ADMIN — HEPARIN SODIUM 5000 UNIT(S): 5000 INJECTION INTRAVENOUS; SUBCUTANEOUS at 17:35

## 2022-11-14 RX ADMIN — INSULIN GLARGINE 8 UNIT(S): 100 INJECTION, SOLUTION SUBCUTANEOUS at 07:51

## 2022-11-14 RX ADMIN — Medication 1 MILLIGRAM(S): at 17:37

## 2022-11-14 RX ADMIN — Medication 250 MILLIGRAM(S): at 16:17

## 2022-11-14 RX ADMIN — SIMETHICONE 80 MILLIGRAM(S): 80 TABLET, CHEWABLE ORAL at 11:23

## 2022-11-14 RX ADMIN — SIMETHICONE 80 MILLIGRAM(S): 80 TABLET, CHEWABLE ORAL at 05:02

## 2022-11-14 RX ADMIN — Medication 2 TABLET(S): at 11:23

## 2022-11-14 RX ADMIN — NYSTATIN CREAM 1 APPLICATION(S): 100000 CREAM TOPICAL at 14:31

## 2022-11-14 RX ADMIN — SIMETHICONE 80 MILLIGRAM(S): 80 TABLET, CHEWABLE ORAL at 17:34

## 2022-11-14 RX ADMIN — Medication 1 APPLICATION(S): at 05:04

## 2022-11-14 RX ADMIN — Medication 1 GRAM(S): at 11:22

## 2022-11-14 RX ADMIN — Medication 1 GRAM(S): at 17:34

## 2022-11-14 RX ADMIN — Medication 1 MILLIGRAM(S): at 21:43

## 2022-11-14 RX ADMIN — Medication 2 MILLIGRAM(S): at 02:06

## 2022-11-14 RX ADMIN — Medication 1 MILLIGRAM(S): at 14:30

## 2022-11-14 RX ADMIN — Medication 1 MILLIGRAM(S): at 10:41

## 2022-11-14 RX ADMIN — Medication 325 MILLIGRAM(S): at 11:23

## 2022-11-14 RX ADMIN — Medication 1 TABLET(S): at 11:23

## 2022-11-14 RX ADMIN — MIDODRINE HYDROCHLORIDE 10 MILLIGRAM(S): 2.5 TABLET ORAL at 21:42

## 2022-11-14 RX ADMIN — Medication 650 MILLIGRAM(S): at 02:23

## 2022-11-14 RX ADMIN — MIDODRINE HYDROCHLORIDE 10 MILLIGRAM(S): 2.5 TABLET ORAL at 14:30

## 2022-11-14 RX ADMIN — Medication 1 TABLET(S): at 05:03

## 2022-11-14 RX ADMIN — Medication 650 MILLIGRAM(S): at 01:23

## 2022-11-14 RX ADMIN — PANTOPRAZOLE SODIUM 40 MILLIGRAM(S): 20 TABLET, DELAYED RELEASE ORAL at 11:23

## 2022-11-14 RX ADMIN — Medication 1 APPLICATION(S): at 05:01

## 2022-11-14 RX ADMIN — MEROPENEM 100 MILLIGRAM(S): 1 INJECTION INTRAVENOUS at 05:15

## 2022-11-14 RX ADMIN — POLYETHYLENE GLYCOL 3350 17 GRAM(S): 17 POWDER, FOR SOLUTION ORAL at 17:34

## 2022-11-14 RX ADMIN — Medication 1 MILLIGRAM(S): at 05:12

## 2022-11-14 RX ADMIN — NYSTATIN CREAM 1 APPLICATION(S): 100000 CREAM TOPICAL at 05:02

## 2022-11-14 RX ADMIN — Medication 1 MILLIGRAM(S): at 01:24

## 2022-11-14 RX ADMIN — CHLORHEXIDINE GLUCONATE 15 MILLILITER(S): 213 SOLUTION TOPICAL at 05:02

## 2022-11-14 RX ADMIN — CHLORHEXIDINE GLUCONATE 15 MILLILITER(S): 213 SOLUTION TOPICAL at 17:33

## 2022-11-14 RX ADMIN — Medication 1 APPLICATION(S): at 21:42

## 2022-11-14 RX ADMIN — HEPARIN SODIUM 5000 UNIT(S): 5000 INJECTION INTRAVENOUS; SUBCUTANEOUS at 05:03

## 2022-11-14 RX ADMIN — SIMETHICONE 80 MILLIGRAM(S): 80 TABLET, CHEWABLE ORAL at 23:07

## 2022-11-14 RX ADMIN — POLYETHYLENE GLYCOL 3350 17 GRAM(S): 17 POWDER, FOR SOLUTION ORAL at 05:03

## 2022-11-14 RX ADMIN — Medication 1 GRAM(S): at 23:07

## 2022-11-14 RX ADMIN — SENNA PLUS 3 TABLET(S): 8.6 TABLET ORAL at 21:42

## 2022-11-14 RX ADMIN — MIDODRINE HYDROCHLORIDE 10 MILLIGRAM(S): 2.5 TABLET ORAL at 05:02

## 2022-11-14 RX ADMIN — Medication 1 APPLICATION(S): at 17:36

## 2022-11-14 RX ADMIN — NYSTATIN CREAM 1 APPLICATION(S): 100000 CREAM TOPICAL at 21:41

## 2022-11-14 RX ADMIN — Medication 1 GRAM(S): at 05:02

## 2022-11-14 RX ADMIN — CHLORHEXIDINE GLUCONATE 1 APPLICATION(S): 213 SOLUTION TOPICAL at 05:01

## 2022-11-14 RX ADMIN — Medication 1 APPLICATION(S): at 14:30

## 2022-11-14 RX ADMIN — ALBUTEROL 2 PUFF(S): 90 AEROSOL, METERED ORAL at 07:31

## 2022-11-14 RX ADMIN — Medication 1 APPLICATION(S): at 11:58

## 2022-11-14 NOTE — PROGRESS NOTE ADULT - PROBLEM SELECTOR PLAN 1
Pt has signs of sepsis possibly secondary to unresolved pneumonia, ventilator associated pneumonia  - recently admitted for VAP, was treated with vanco zosyn, changed to doxy and bactrim, discharged recently with abx via picc line  - care d/w RN yesterday, picc line is flushing fine, unclear why pt's WBC increased, care was d/w ID, and pt was started on meropenem 11/12; WBC downtrended significantly from 15 to 7, now mildly uptrending, but afebrile, and still within normal limits, will cont to f/u ID recs  - cont to f/u ID Dr. Chairez recs  - will f/u cultures  - cxr shows unchanged b/l infiltrates and irght pleural effusion, f/u pulm recs, cont vent  - monitor in SPCU  - palliative following, pt remains full code, ongoing discussions with  Pt has signs of sepsis possibly secondary to unresolved pneumonia, ventilator associated pneumonia  - recently admitted for VAP, was treated with vanco zosyn, changed to doxy and bactrim, discharged recently with abx via mid line  - care d/w RN yesterday, mid line is flushing fine, unclear why pt's WBC increased, care was d/w ID, and pt was started on meropenem 11/12; WBC downtrended significantly from 15 to 7, now mildly uptrending, but afebrile, and still within normal limits, will cont to f/u ID recs  - cont to f/u ID Dr. Chairez recs  - will f/u cultures  - cxr shows unchanged b/l infiltrates and irght pleural effusion, f/u pulm recs, cont vent  - monitor in SPCU  - palliative following, pt remains full code, ongoing discussions with

## 2022-11-14 NOTE — PROGRESS NOTE ADULT - ASSESSMENT
76 year old female with PMHx of dementia, COPD with chronic respiratory failure s/p trach, cardiac arrest, CHH, quadriplegia, CVA, CKD, anemia, PEG tube, and multiple hospital admissions for respiratory distress presents to the ED BIBEMS from [facility] for shortness of breath, found ot have low grade fever, midline malfunction, and leukocytosis to 16, concerning for sepsis

## 2022-11-14 NOTE — CONSULT NOTE ADULT - ASSESSMENT
CKD 3, rising creatinine trend on bactrim  Pneumonia, Pleural effusions  Chronic respiratory failure, on vent  Diabetes    On abx as per ID. Will follow creatinine trend. On IVF. Monitor blood sugar levels. Insulin coverage as needed.  Avoid nephrotoxic meds as possible. Will follow electrolytes and renal function trend.   Further recommendations pending clinical course. Thank you for the courtesy of this referral.

## 2022-11-14 NOTE — PROGRESS NOTE ADULT - ASSESSMENT
REVIEW OF SYMPTOMS      Able to give (reliable) ROS  NO     PHYSICAL EXAM    HEENT Unremarkable  atraumatic   RESP Fair air entry EXP prolonged    Harsh breath sound Resp distres mild   CARDIAC S1 S2 No S3     NO JVD    ABDOMEN SOFT BS PRESENT NOT DISTENDED No hepatosplenomegaly   PEDAL EDEMA present No calf tenderness  NO rash       GENERAL DATA .   GOC.  11/12/2022 full code        ALLGY. codeine                            WT.  11/12/2022 57  BMI.   11/12/2022 20                           ICU STAY. 11/12/2022  COVID. 11/12/2022 scv2 (-)      PROCEDURES.  11/12/2022 IO in ER  11/12 reid io monitor     BEST PRACTICE ISSUES.    HOB ELEVATN. Yes  DVT PPLX.  11/12/2022 hosc     SQUIRES PPLX.  11/12/2022 protonix 40     11/12/2022 carafate   INFN PPLX.  11/12/2022 chlorhexidine .12%   chlorhexidine 2% 11/12   SP SW EM.        DIET.  11/13/2022 npo     VS/ PO/IO/ VENT/ DRIPS.   11/14/2022 afeb 48 120/50   11/14/2022 ac 18/400/5/.4   11/13/2022 55 100/49   11/13/2022 18/400/5/.5     OVERALL ASSESSMENT/DISPOSITION.  76 f PMH trach peg cva cac anoxic encephalo PH 8/19/2022 pasp 58 bl pl effs  r thora 6/8/2022 and l thora 5/25/2022 were lp exudates  recurrent hospitalizations HO CR psuedomonas 5/2/2022 zosyn 8/19/2022  recent admission 10/18-11/2/2022 uc 10/18 100K E coli  sp 10/19 Stenotrophomonas  10/19-10/26/2022 levaquin 750  10/21/2022 rocephin x 7d Dr BRITTANY Brantley readmitted recently  11/4-11/10/2022 with fever trach 11/5 stenotrophomonas   uc 11/4 vr enterococc 50-99 candida   11/4/2022 levaquin 750 dced 11/7 doxy  11/8 bactrim 250.3    Pt sent back from [facility] 11/12/2022 with resp distress /70 p 119 r 36 T 100 po 93 on 100% GCS 10 leukocytosis w 15   Pulm crit care consulted      PROBLEMS.  Vent dependent   Trach poa 11/12/2022    VAP 11/12/2022  Decub ulcers   Meropenem 11/12 7d  Bactrim bid 11/12      COPD    Low BP 11/12/2022     peg poa 11/12/2022   GT leak 11/13/2022  -> GI calld     ANEMIA Hb 11/12-11/13/2022 Hb 10.3 - 7.8     AGITATION 11/12         ASSESSMENT/RECOMMENDATIONS .   RESP.  .. Gas exchange.  Monitor & target po 90-95%  11/12/2022 check abg   .. RO VTE.  V duplx legs 11/4 (-)   11/12/2022  venous duplx (-)   .. COPD.  11/12/2022 albuterol 2px2   11/12/2022 duoneb.4   cont rx  INFECTION.  .. SCV2 status.  Scv2 11/12/2022 scv2 (-)   .. VAP   w 11/12-11/13-11/14/2022 w 15.4 - 7.1 - 9.4  ua 11/12/2022 w 6-10   cxr 11/12/2022 diffuse infiltr nsc 11/8   rvp 11/12/2022 (-)   trach 11/13 (-)   bc 11/12 (-)   uc 11/12 (-)   mrsa 11/13 (-)   11/12 meropenem 1.2 x 7d   11/12 bactrim bid   CARDIAC.  .. Hemodynamics.   .. low bp.  la 11/12/2022 la 1.8   11/12/2022 midodrine 10.3   target map 65 (+)   .. ro CHF  bnp 11/13/2022 bnp [id number]  echo 8/19/2022 dd1 pasp 58 n rvsf   optimize volume state   .. RO MI.   ekg 11/4/2022 irbb septal infarct age undetermined septal infarct not seen 10/18/2022   11/12/2022 check ekg trop   GI.  .. Nutrition.  11/13/2022 made npo as gt leak  .. elevated lfts.  LFTS 11/12/2022   ap 145  ast 18  alt 15   .. constipat.  11/12/2022 miralax   11/12/2022 senna   .. GT LEAK 11/13/2022 11/13/2022 GI consulted   11/13/2022 made npo  11/14/2022 awaiting part (per gi)   HEMAT.  .. DVT pplx.   11/12/2022 hpsc  .. anemia.  Hb 11/12-11/13-11/14/2022 Hb 10.3 - 7.8- 7.7    mcv 11/12/2022 mcv 90   inr 11/12/2022 inr 124   monitor   RENAL.  .. renal parameters.  Na 11/12-11/13/2022 Na 138- 139  K 11/12/2022 K 5.3   co2 11/12/2022 co2 30   Cr 11/12-11/13/2022 Cr 1.1 - 1.3   monitor  .. REID  11/12 for io monitoring  IV fl.  11/13/2022 lr 50   SKIN  .. Skin break down.  11/12/2022 Dakins   ENDOCRINE.   .. DM.  11/12/2022 Insulin 8 u q a   11/12/2022 riss   NEURO.  .. AGITATION.  11/12 lorazepam 2.4p     TIME SPENT   Over 39 minutes aggregate critical care time spent on encounter; activities included   direct patient care, counseling and/or coordinating care reviewing notes, lab data/ imaging , discussion with multidisciplinary team/ patient  /family and explaining in detail risks, benefits, alternatives  of the recommendations     CHAPINCITO GUIDRY 76 f [facility] S 11/12/2022   DR JASMEET HEARD

## 2022-11-14 NOTE — PROGRESS NOTE ADULT - PROBLEM SELECTOR PLAN 3
Pt's Cr at 1.31, uptrending, Dr. Quintero called for a Renal consult Pt's Cr at 1.31, uptrending, Dr. Quintero called for a Renal consult  - pt was on tube feeds but they were discontinued by GI because fo peg tube iste being excoriated, may need replacement, GI consult following, will obtain nutrition consult as well  - difficult to give fluids as pt has pleural effusions that may worsen respiratory status

## 2022-11-14 NOTE — PROGRESS NOTE ADULT - ASSESSMENT
75YO F resident of [facility] PMHx of dementia, COPD with chronic respiratory failure s/p trach, cardiac arrest, CHH, quadriplegia, CVA, CKD, anemia, PEG tube, sacral decub,  multiple hospital admissions most recent admission for VAP CAUTI   discharged on Bactrim and Doxy po readmitted with shortness of breath, low grade fever Tmax 100, midline malfunction, WBC 16. Pt is in vegetative state.   Pt high risk for recurrent infections   Multiple sources possible  Poss Recurrent VAP  Decub ulcers  Leukocytosis could poss reactive - normalised    Plan :   Cx reviewed  MRSA and MDR/CRE Acinetobacter  D/c meropenem  D/c vancomycin  Start [id number] BID  Start vancomycin 750mg q24h, goal trough 15-20  Fu cultures  Trend temps and cbc  Vent per Pulm   Full Code per family wishes    Infectious Diseases will continue to follow. Please call with any questions.   Andressa Brantley M.D.  [facility] Division of Infectious Diseases [phone]   77YO F resident of [facility] PMHx of dementia, COPD with chronic respiratory failure s/p trach, cardiac arrest, CHH, quadriplegia, CVA, CKD, anemia, PEG tube, sacral decub,  multiple hospital admissions most recent admission for VAP CAUTI   discharged on Bactrim and Doxy po readmitted with shortness of breath, low grade fever Tmax 100, midline malfunction, WBC 16. Pt is in vegetative state.   Pt high risk for recurrent infections   Multiple sources possible  Poss Recurrent VAP  Decub ulcers  Leukocytosis could poss reactive - normalized    Plan :   Cx reviewed  Tracheal Normal Oral Elvira  BCx NGTD  D/c meropenem  D/c bactrim  C/w vancomycin for now  Trend temps and cbc  Vent per Pulm   Full Code per family wishes    Infectious Diseases will continue to follow. Please call with any questions.   Andressa Brantley M.D.  [facility] Division of Infectious Diseases [phone]   75YO F resident of [facility] PMHx of dementia, COPD with chronic respiratory failure s/p trach, cardiac arrest, CHH, quadriplegia, CVA, CKD, anemia, PEG tube, sacral decub,  multiple hospital admissions most recent admission for VAP CAUTI   discharged on Bactrim and Doxy po readmitted with shortness of breath, low grade fever Tmax 100, midline malfunction, WBC 16. Pt is in vegetative state.   Pt high risk for recurrent infections   Multiple sources possible  Poss Recurrent VAP  Decub ulcers  Leukocytosis could poss reactive - normalized    Plan :   Cx reviewed  Tracheal Normal Oral Elvira  BCx NGTD  Prefer short course Abx  Currently on bactrim/meropenem  Trend temps and cbc  Vent per Pulm   Full Code per family wishes    Infectious Diseases will continue to follow. Please call with any questions.   Andressa Brantley M.D.  \[facility]\"" Division of Infectious Diseases [phone]

## 2022-11-14 NOTE — PROGRESS NOTE ADULT - ASSESSMENT
75 y/o female with pmhx of CVA, dementia, chronic respiratory failure s/p trach/peg, cardiac arrest, CVA, quadriplegia, admittred on 11/12 for VAP and sepsis    1.VAP MRSA and CRE acinetobacter  2. severe sepsis without shock  3. chronic respiratory failure      - on baseline vent settings. titrating fio2 for goal spo2 > 92%  - minocycline and vancomycin. ID following  - heparin subq  - midodrine  - BG stable  - tube feeds as tolerated

## 2022-11-14 NOTE — PROGRESS NOTE ADULT - ASSESSMENT
The patient is a 76 year old female with a history of HTN, DM, CVA, dementia, chronic respiratory failure s/p trach, PEG, cardiac arrest, anemia, pleural effusions who presents with fever and respiratory distress.    Plan:  - Echo 8/22 with normal LV systolic function, mod pulm HTN, IVC small/collapsible  - CXR with diffuse lung infiltrates  - Intermittently sinus bradycardic at times  - Continue midodrine 10 mg tid  - IV antibiotics  - Pulm and ID follow-up  - Comfort care would be most appropriate

## 2022-11-15 LAB
ANION GAP SERPL CALC-SCNC: 5 MMOL/L — SIGNIFICANT CHANGE UP (ref 5–17)
BUN SERPL-MCNC: 18 MG/DL — SIGNIFICANT CHANGE UP (ref 7–23)
CALCIUM SERPL-MCNC: 7.9 MG/DL — LOW (ref 8.4–10.5)
CHLORIDE SERPL-SCNC: 103 MMOL/L — SIGNIFICANT CHANGE UP (ref 96–108)
CO2 SERPL-SCNC: 29 MMOL/L — SIGNIFICANT CHANGE UP (ref 22–31)
CREAT SERPL-MCNC: 1.04 MG/DL — SIGNIFICANT CHANGE UP (ref 0.5–1.3)
EGFR: 55 ML/MIN/1.73M2 — LOW
GLUCOSE BLDC GLUCOMTR-MCNC: 104 MG/DL — HIGH (ref 70–99)
GLUCOSE BLDC GLUCOMTR-MCNC: 104 MG/DL — HIGH (ref 70–99)
GLUCOSE BLDC GLUCOMTR-MCNC: 92 MG/DL — SIGNIFICANT CHANGE UP (ref 70–99)
GLUCOSE BLDC GLUCOMTR-MCNC: 99 MG/DL — SIGNIFICANT CHANGE UP (ref 70–99)
GLUCOSE SERPL-MCNC: 93 MG/DL — SIGNIFICANT CHANGE UP (ref 70–99)
HCT VFR BLD CALC: 28 % — LOW (ref 34.5–45)
HGB BLD-MCNC: 8.6 G/DL — LOW (ref 11.5–15.5)
MCHC RBC-ENTMCNC: 27.6 PG — SIGNIFICANT CHANGE UP (ref 27–34)
MCHC RBC-ENTMCNC: 30.7 GM/DL — LOW (ref 32–36)
MCV RBC AUTO: 89.7 FL — SIGNIFICANT CHANGE UP (ref 80–100)
NRBC # BLD: 0 /100 WBCS — SIGNIFICANT CHANGE UP (ref 0–0)
PLATELET # BLD AUTO: 163 K/UL — SIGNIFICANT CHANGE UP (ref 150–400)
POTASSIUM SERPL-MCNC: 3.8 MMOL/L — SIGNIFICANT CHANGE UP (ref 3.5–5.3)
POTASSIUM SERPL-SCNC: 3.8 MMOL/L — SIGNIFICANT CHANGE UP (ref 3.5–5.3)
RBC # BLD: 3.12 M/UL — LOW (ref 3.8–5.2)
RBC # FLD: 17 % — HIGH (ref 10.3–14.5)
SODIUM SERPL-SCNC: 137 MMOL/L — SIGNIFICANT CHANGE UP (ref 135–145)
WBC # BLD: 6.32 K/UL — SIGNIFICANT CHANGE UP (ref 3.8–10.5)
WBC # FLD AUTO: 6.32 K/UL — SIGNIFICANT CHANGE UP (ref 3.8–10.5)

## 2022-11-15 PROCEDURE — 99232 SBSQ HOSP IP/OBS MODERATE 35: CPT

## 2022-11-15 RX ORDER — MEROPENEM 1 G/30ML
1000 INJECTION INTRAVENOUS EVERY 12 HOURS
Refills: 0 | Status: DISCONTINUED | OUTPATIENT
Start: 2022-11-16 | End: 2022-11-16

## 2022-11-15 RX ORDER — MEROPENEM 1 G/30ML
1000 INJECTION INTRAVENOUS ONCE
Refills: 0 | Status: COMPLETED | OUTPATIENT
Start: 2022-11-15 | End: 2022-11-15

## 2022-11-15 RX ORDER — MEROPENEM 1 G/30ML
INJECTION INTRAVENOUS
Refills: 0 | Status: DISCONTINUED | OUTPATIENT
Start: 2022-11-15 | End: 2022-11-16

## 2022-11-15 RX ADMIN — SIMETHICONE 80 MILLIGRAM(S): 80 TABLET, CHEWABLE ORAL at 17:45

## 2022-11-15 RX ADMIN — Medication 2 MILLIGRAM(S): at 00:31

## 2022-11-15 RX ADMIN — Medication 1 APPLICATION(S): at 12:24

## 2022-11-15 RX ADMIN — Medication 1 TABLET(S): at 12:20

## 2022-11-15 RX ADMIN — NYSTATIN CREAM 1 APPLICATION(S): 100000 CREAM TOPICAL at 14:44

## 2022-11-15 RX ADMIN — Medication 1 MILLIGRAM(S): at 10:15

## 2022-11-15 RX ADMIN — NYSTATIN CREAM 1 APPLICATION(S): 100000 CREAM TOPICAL at 21:29

## 2022-11-15 RX ADMIN — Medication 30 MILLILITER(S): at 21:14

## 2022-11-15 RX ADMIN — Medication 2 MILLIGRAM(S): at 08:21

## 2022-11-15 RX ADMIN — NYSTATIN CREAM 1 APPLICATION(S): 100000 CREAM TOPICAL at 05:05

## 2022-11-15 RX ADMIN — CHLORHEXIDINE GLUCONATE 15 MILLILITER(S): 213 SOLUTION TOPICAL at 17:46

## 2022-11-15 RX ADMIN — Medication 1 APPLICATION(S): at 21:28

## 2022-11-15 RX ADMIN — INSULIN GLARGINE 8 UNIT(S): 100 INJECTION, SOLUTION SUBCUTANEOUS at 08:19

## 2022-11-15 RX ADMIN — Medication 1 GRAM(S): at 05:03

## 2022-11-15 RX ADMIN — ALBUTEROL 2 PUFF(S): 90 AEROSOL, METERED ORAL at 07:49

## 2022-11-15 RX ADMIN — Medication 1 MILLIGRAM(S): at 02:47

## 2022-11-15 RX ADMIN — CHLORHEXIDINE GLUCONATE 1 APPLICATION(S): 213 SOLUTION TOPICAL at 06:02

## 2022-11-15 RX ADMIN — Medication 1 APPLICATION(S): at 05:04

## 2022-11-15 RX ADMIN — MIDODRINE HYDROCHLORIDE 10 MILLIGRAM(S): 2.5 TABLET ORAL at 14:42

## 2022-11-15 RX ADMIN — SIMETHICONE 80 MILLIGRAM(S): 80 TABLET, CHEWABLE ORAL at 12:20

## 2022-11-15 RX ADMIN — SENNA PLUS 3 TABLET(S): 8.6 TABLET ORAL at 21:16

## 2022-11-15 RX ADMIN — SIMETHICONE 80 MILLIGRAM(S): 80 TABLET, CHEWABLE ORAL at 23:03

## 2022-11-15 RX ADMIN — POLYETHYLENE GLYCOL 3350 17 GRAM(S): 17 POWDER, FOR SOLUTION ORAL at 17:45

## 2022-11-15 RX ADMIN — CHLORHEXIDINE GLUCONATE 15 MILLILITER(S): 213 SOLUTION TOPICAL at 05:03

## 2022-11-15 RX ADMIN — Medication 1 GRAM(S): at 17:46

## 2022-11-15 RX ADMIN — HEPARIN SODIUM 5000 UNIT(S): 5000 INJECTION INTRAVENOUS; SUBCUTANEOUS at 17:45

## 2022-11-15 RX ADMIN — Medication 1 GRAM(S): at 12:20

## 2022-11-15 RX ADMIN — HEPARIN SODIUM 5000 UNIT(S): 5000 INJECTION INTRAVENOUS; SUBCUTANEOUS at 05:04

## 2022-11-15 RX ADMIN — Medication 30 MILLILITER(S): at 05:03

## 2022-11-15 RX ADMIN — Medication 1 MILLIGRAM(S): at 17:45

## 2022-11-15 RX ADMIN — Medication 1 APPLICATION(S): at 14:44

## 2022-11-15 RX ADMIN — POLYETHYLENE GLYCOL 3350 17 GRAM(S): 17 POWDER, FOR SOLUTION ORAL at 05:03

## 2022-11-15 RX ADMIN — Medication 1 MILLIGRAM(S): at 14:42

## 2022-11-15 RX ADMIN — MIDODRINE HYDROCHLORIDE 10 MILLIGRAM(S): 2.5 TABLET ORAL at 05:04

## 2022-11-15 RX ADMIN — Medication 30 MILLILITER(S): at 14:42

## 2022-11-15 RX ADMIN — ALBUTEROL 2 PUFF(S): 90 AEROSOL, METERED ORAL at 20:16

## 2022-11-15 RX ADMIN — Medication 1 GRAM(S): at 23:03

## 2022-11-15 RX ADMIN — Medication 1 TABLET(S): at 14:42

## 2022-11-15 RX ADMIN — Medication 325 MILLIGRAM(S): at 12:20

## 2022-11-15 RX ADMIN — Medication 2 TABLET(S): at 12:20

## 2022-11-15 RX ADMIN — MEROPENEM 100 MILLIGRAM(S): 1 INJECTION INTRAVENOUS at 14:43

## 2022-11-15 RX ADMIN — SODIUM CHLORIDE 50 MILLILITER(S): 9 INJECTION, SOLUTION INTRAVENOUS at 14:45

## 2022-11-15 RX ADMIN — Medication 1 MILLIGRAM(S): at 21:14

## 2022-11-15 RX ADMIN — SIMETHICONE 80 MILLIGRAM(S): 80 TABLET, CHEWABLE ORAL at 05:03

## 2022-11-15 RX ADMIN — PANTOPRAZOLE SODIUM 40 MILLIGRAM(S): 20 TABLET, DELAYED RELEASE ORAL at 12:19

## 2022-11-15 RX ADMIN — Medication 1 APPLICATION(S): at 12:23

## 2022-11-15 RX ADMIN — Medication 1 MILLIGRAM(S): at 05:04

## 2022-11-15 NOTE — CONSULT NOTE ADULT - CONSULT REQUESTED DATE/TIME
14-Nov-2022 13:43
12-Nov-2022 12:26
15-Nov-2022 11:49
12-Nov-2022 08:40
12-Nov-2022
13-Nov-2022 21:38
12-Nov-2022 07:16

## 2022-11-15 NOTE — PHARMACOTHERAPY INTERVENTION NOTE - COMMENTS
Antimicrobial Stewardship program  ASP: restricted antibiotic   Calculated CrCl = 39.5ml/min  Meropenem 1g IVPB q12h (renal dosing)  ID physician on case: Dr. Andressa Brantley

## 2022-11-15 NOTE — PROGRESS NOTE ADULT - PROBLEM SELECTOR PLAN 2
cont lantus, insulin coverage scale, FS

## 2022-11-15 NOTE — PROGRESS NOTE ADULT - ASSESSMENT
REVIEW OF SYMPTOMS      Able to give (reliable) ROS  NO     PHYSICAL EXAM    HEENT Unremarkable  atraumatic   RESP Fair air entry EXP prolonged    Harsh breath sound Resp distres mild   CARDIAC S1 S2 No S3     NO JVD    ABDOMEN SOFT BS PRESENT NOT DISTENDED No hepatosplenomegaly   PEDAL EDEMA present No calf tenderness  NO rash       GENERAL DATA .   GOC.  11/12/2022 full code        ALLGY. codeine                            WT.  11/12/2022 57  BMI.   11/12/2022 20                           ICU STAY. 11/12/2022  COVID. 11/12/2022 scv2 (-)      PROCEDURES.  11/12/2022 IO in ER  11/12 reid io monitor     BEST PRACTICE ISSUES.    HOB ELEVATN. Yes  DVT PPLX.  11/12/2022 hosc     SQUIRES PPLX.  11/12/2022 protonix 40     11/12/2022 carafate   INFN PPLX.  11/12/2022 chlorhexidine .12%   chlorhexidine 2% 11/12   SP SW EM.        DIET.  11/14 glucerna tf started by hospitalist    VS/ PO/IO/ VENT/ DRIPS.   11/15/2022afeb 50 118/80   11/15/2022 ac 18/400/5/.4     OVERALL ASSESSMENT/DISPOSITION.  76 f PMH trach peg cva cac anoxic encephalo PH 8/19/2022 pasp 58 bl pl effs  r thora 6/8/2022 and l thora 5/25/2022 were lp exudates  recurrent hospitalizations HO CR psuedomonas 5/2/2022 zosyn 8/19/2022  recent admission 10/18-11/2/2022 uc 10/18 100K E coli  sp 10/19 Stenotrophomonas  10/19-10/26/2022 levaquin 750  10/21/2022 rocephin x 7d Dr BRITTANY Brantley readmitted recently  11/4-11/10/2022 with fever trach 11/5 stenotrophomonas   uc 11/4 vr enterococc 50-99 candida   11/4/2022 levaquin 750 dced 11/7 doxy  11/8 bactrim 250.3    Pt sent back from [facility] 11/12/2022 with resp distress /70 p 119 r 36 T 100 po 93 on 100% GCS 10 leukocytosis w 15   Pulm crit care consulted      PROBLEMS.  Vent dependent   Trach poa 11/12/2022    VAP stenotrophomonas 11/12/2022  Decub ulcers   Meropenem 11/12 7d  Bactrim bid 11/12      COPD    Low BP 11/12/2022     peg poa 11/12/2022   GT leak 11/13/2022  -> GI calld     ANEMIA Hb 11/12-11/13/2022 Hb 10.3 - 7.8     AGITATION 11/12         ASSESSMENT/RECOMMENDATIONS .   RESP.  .. Gas exchange.  Monitor & target po 90-95%  11/12/2022 check abg   .. RO VTE.  V duplx legs 11/4 (-)   11/12/2022  venous duplx (-)   .. COPD.  11/12/2022 albuterol 2px2   11/12/2022 duoneb.4   cont rx  INFECTION.  .. SCV2 status.  Scv2 11/12/2022 scv2 (-)   .. VAP   w 11/12-11/13-11/14/2022 w 15.4 - 7.1 - 9.4  ua 11/12/2022 w 6-10   cxr 11/12/2022 diffuse infiltr nsc 11/8   rvp 11/12/2022 (-)   trach 11/13 stenotrophomonas   bc 11/12 (-)   uc 11/12 (-)   mrsa 11/13 (-)   11/12 meropenem 1.2 x 7d   11/12 bactrim bid   CARDIAC.  .. Hemodynamics.   .. low bp.  la 11/12/2022 la 1.8   11/12/2022 midodrine 10.3   target map 65 (+)   .. ro CHF  bnp 11/13/2022 bnp [id number]  echo 8/19/2022 dd1 pasp 58 n rvsf   optimize volume state   .. RO MI.   ekg 11/4/2022 irbb septal infarct age undetermined septal infarct not seen 10/18/2022   11/12/2022 check ekg trop   GI.  .. Nutrition.  11/13/2022 made npo as gt leak  11/14 glucerna tf started by hospitalist  .. elevated lfts.  LFTS 11/12/2022   ap 145  ast 18  alt 15   .. constipat.  11/12/2022 miralax   11/12/2022 senna   .. GT LEAK 11/13/2022 11/13/2022 GI consulted   11/13/2022 made npo  11/14/2022 awaiting part (per gi)   11/14 glucerna tf started by hospitalist  HEMAT.  .. DVT pplx.   11/12/2022 hpsc  .. anemia.  Hb 11/12-11/13-11/14-11/15/2022 Hb 10.3 - 7.8- 7.7 - 8.6    mcv 11/12/2022 mcv 90   inr 11/12/2022 inr 124   monitor   RENAL.  .. renal parameters.  Na 11/12-11/13/2022 Na 138- 139  K 11/12/2022 K 5.3   co2 11/12/2022 co2 30   Cr 11/12-11/13-11/15/2022 Cr 1.1 - 1.3 - 1   monitor  .. REID  11/12 for io monitoring  IV fl.  11/13/2022 lr 50   SKIN  .. Skin break down.  11/12/2022 Dakins   ENDOCRINE.   .. DM.  11/12/2022 Insulin 8 u q a   11/12/2022 riss   NEURO.  .. AGITATION.  11/12 lorazepam 2.4p     TIME SPENT   Over 39 minutes aggregate critical care time spent on encounter; activities included   direct patient care, counseling and/or coordinating care reviewing notes, lab data/ imaging , discussion with multidisciplinary team/ patient  /family and explaining in detail risks, benefits, alternatives  of the recommendations     CHAPINCITO GUIDRY 76 f [facility] S 11/12/2022   DR JASMEET HEARD

## 2022-11-15 NOTE — PROGRESS NOTE ADULT - PROBLEM/PLAN-5
Attempted to reach pt for care coordination left  to call [phone]
DISPLAY PLAN FREE TEXT

## 2022-11-15 NOTE — PROGRESS NOTE ADULT - ASSESSMENT
77YO F resident of [facility] PMHx of dementia, COPD with chronic respiratory failure s/p trach, cardiac arrest, CHH, quadriplegia, CVA, CKD, anemia, PEG tube, sacral decub,  multiple hospital admissions most recent admission for VAP CAUTI   discharged on Bactrim and Doxy po readmitted with shortness of breath, low grade fever Tmax 100, midline malfunction, WBC 16. Pt is in vegetative state.   Pt high risk for recurrent infections   Multiple sources possible  Poss Recurrent VAP  Decub ulcers  Leukocytosis could poss reactive - normalized    Plan :   Cx reviewed  Tracheal Normal Oral Elvira  BCx NGTD  Tx based of prior RCx of serratia and stenotrophomonas  C/w meropenem/bactrim last day 11/16  Hold off on additional vancomycin  Trend temps and cbc  Vent per Pulm   Full Code per family wishes    D/w   D/w Dr. Buenrostro    Infectious Diseases will continue to follow. Please call with any questions.   Andressa Brantley M.D.  [facility] Division of Infectious Diseases [phone]

## 2022-11-15 NOTE — CONSULT NOTE ADULT - NSCONSULTADDITIONALINFOA_GEN_ALL_CORE
Diet, NPO with Tube Feed:   Tube Feeding Modality: Gastrostomy  Glucerna 1.5 Horacio  Total Volume for 24 Hours (mL): 1000  Continuous  Starting Tube Feed Rate {mL per Hour}: 10  Increase Tube Feed Rate by (mL): 10     Every 10 hours  Until Goal Tube Feed Rate (mL per Hour): 50  Tube Feed Duration (in Hours): 20  Tube Feed Start Time: 17:00  Free Water Flush  Pump   Rate (mL per Hour): 25   Frequency: Every Hour    Duration (Hours): 24 (11-14-22 @ 15:57) [Active]

## 2022-11-15 NOTE — PROGRESS NOTE ADULT - PROBLEM SELECTOR PROBLEM 4
Chronic obstructive pulmonary disease (COPD)
Anemia of chronic disease
Chronic obstructive pulmonary disease (COPD)

## 2022-11-15 NOTE — CONSULT NOTE ADULT - ASSESSMENT
Patient presents with   1.  Chronic Gluteal fold unstageable pressure injury 4 x 3.5 x 2.5 tan slough  probe to the [id number]% scant/moderate serosanguinous drainage no odor, warmth, induration, fluctuance, periwound erythema, maceration:,   recommendation:   -aquacel daily and PRN soiling  2. Sacral area scar tissue likely a  resolved stage 3/4 pressure injury  -as per prevention protocol  3. Left hallux unstageable pressure injury 1.5 x 1.5 dry, periwound dry mild erythema  recommendation:   -Continue Betadine daily  4. Right hallux unstageable pressure injury 1.5 x 1.5 dry, periwound dry mild erythema  recommendation:   -Continue Betadine daily  Patient is on a low air loss mattress  recommendation:   -microclimate bed  for the critically ill patient experiencing multiorgan failure, impaired tissue oxygenation, and perfusion can contribute to skin failure thus the development of a pressure related injury may be unavoidable    This pressure injury is community acquired/YES  At risk for altered tissue perfusion /YES  Impaired perfusion of peripheral tissue /YES  Continue  Nutrition (as tolerated)  Continue  Offloading   Continue Pericare  Apply cair boots at all times while in bed.   Provide skin checks and foot placement q8h.  Care as per medicine will follow w/ you  Follow up as [facility]   Findings and recommendations discussed with BRANDEN gage  Thank you for this consult  Ana Luisa Cantu NP, [facility] [phone]

## 2022-11-15 NOTE — PROGRESS NOTE ADULT - PROBLEM SELECTOR PLAN 3
Pt's Cr at 1.31, uptrending, Dr. Quintero called for a Renal consult  - pt was on tube feeds but they were discontinued by GI because fo peg tube iste being excoriated, may need replacement, GI consult following, will obtain nutrition consult as well  - difficult to give fluids as pt has pleural effusions that may worsen respiratory status

## 2022-11-15 NOTE — CONSULT NOTE ADULT - REASON FOR ADMISSION
sepsis, leukocytosis, midline adjustment

## 2022-11-15 NOTE — CONSULT NOTE ADULT - SUBJECTIVE AND OBJECTIVE BOX
HPI:  77YO F resident of [facility] PMHx of dementia, COPD with chronic respiratory failure s/p trach, cardiac arrest, CHH, quadriplegia, CVA, CKD, anemia, PEG tube, sacral decub,  multiple hospital admissions most recent admission for VAP CAUTI   discharged on Bactrim and Doxy po who presented to the hospital with cc of shortness of breath, low grade fever Tmax 100, midline malfunction, WBC 16. Pt is in vegetative state. No documented n/v/d.    Infectious Disease consult was called to evaluate pt and for antibiotic management      Past Medical & Surgical Hx:  PAST MEDICAL & SURGICAL HISTORY:  Dementia of frontal lobe type  Aphasic stroke  Diabetes mellitus  Respiratory failure  Hypertension  GERD (gastroesophageal reflux disease)  Constipation  Respiratory failure  CVA (cerebral vascular accident)  HTN (hypertension)  DM (diabetes mellitus)  Advanced dementia  COVID-19 virus detected  Quadriplegia  Pneumonia  Type II diabetes mellitus  Hx of appendectomy  Gastrostomy in place  Tracheostomy in place  Feeding by G-tube        Social History--  EtOH: denies   Tobacco: denies   Drug Use: denies      FAMILY HISTORY:  No pertinent family history in first degree relatives        Allergies  codeine (Hives)    Intolerances  NONE    Home Medications:  albuterol 90 mcg/inh inhalation aerosol with adapter: 2  inhaled every 6 hours (12 Nov 2022 07:37)  Bacid (LAC) oral tablet: 2 tab(s) by gastrostomy tube once a day (12 Nov 2022 07:37)  bacitracin 500 units/g topical ointment: Apply topically to affected area once a day to knees (12 Nov 2022 09:45)  chlorhexidine 0.12% mucous membrane liquid: 15 milliliter(s) mucous membrane 2 times a day (12 Nov 2022 07:37)  Dakins Full Strength 0.5% topical solution: Apply topically to affected area 2 times a day then apply santyl and calcium alginate (12 Nov 2022 09:45)  Eucerin topical cream: Apply topically to affected area once a day bilateral feet (12 Nov 2022 07:37)  ferrous sulfate 325 mg (65 mg elemental iron) oral tablet: 1 tab(s) by gastrostomy tube once a day (12 Nov 2022 09:45)  insulin glargine 100 units/mL subcutaneous solution: 8 unit(s) subcutaneous once a day (in the morning) (12 Nov 2022 07:37)  ipratropium-albuterol 0.5 mg-2.5 mg/3 mL inhalation solution: 3 milliliter(s) inhaled every 6 hours (12 Nov 2022 09:45)  LORazepam 1 mg oral tablet: 1 tab(s) by gastrostomy tube every 4 hours (12 Nov 2022 09:45)  midodrine 10 mg oral tablet: 1 tab(s) by gastrostomy tube every 8 hours (12 Nov 2022 09:45)  mulivitamin: by gastrostomy tube once a day (12 Nov 2022 09:45)  nystatin 100,000 units/g topical powder: 1 application topically 3 times a day (12 Nov 2022 07:37)  omeprazole 40 mg oral delayed release capsule: 1 cap(s) by gastrostomy tube 2 times a day (12 Nov 2022 09:45)  polyethylene glycol 3350 oral powder for reconstitution: 17 gram(s) by gastrostomy tube every 12 hours (12 Nov 2022 07:37)  senna 8.6 mg oral tablet: 3 tab(s) by gastrostomy tube once a day (at bedtime) (12 Nov 2022 07:37)  simethicone 80 mg oral tablet, chewable: 1 tab(s) by gastrostomy tube every 6 hours (12 Nov 2022 07:37)  sucralfate 1 g/10 mL oral suspension: 10 milliliter(s) g-tube 4 times a day (before meals and at bedtime) (12 Nov 2022 07:37)  Tylenol 325 mg oral tablet: 2 tab(s) orally every 6 hours, As Needed prior to dressing change (12 Nov 2022 09:45)    Current Inpatient Medications :    ANTIBIOTICS:       OTHER RELEVANT MEDICATIONS :  acetaminophen     Tablet .. 650 milliGRAM(s) Oral every 6 hours PRN  albuterol    90 MICROgram(s) HFA Inhaler 2 Puff(s) Inhalation two times a day  albuterol/ipratropium for Nebulization 3 milliLiter(s) Nebulizer every 6 hours  bacitracin   Ointment 1 Application(s) Topical daily  chlorhexidine 0.12% Liquid 15 milliLiter(s) Oral Mucosa every 12 hours  Dakins Solution - Full Strength 1 Application(s) Topical two times a day  dextrose 5%. 1000 milliLiter(s) IV Continuous <Continuous>  dextrose 5%. 1000 milliLiter(s) IV Continuous <Continuous>  dextrose 50% Injectable 25 Gram(s) IV Push once  dextrose 50% Injectable 12.5 Gram(s) IV Push once  dextrose 50% Injectable 25 Gram(s) IV Push once  dextrose Oral Gel 15 Gram(s) Oral once PRN  ferrous    sulfate 325 milliGRAM(s) Oral daily  glucagon  Injectable 1 milliGRAM(s) IntraMuscular once  heparin   Injectable 5000 Unit(s) SubCutaneous every 12 hours  insulin glargine Injectable (LANTUS) 8 Unit(s) SubCutaneous every morning  insulin lispro (ADMELOG) corrective regimen sliding scale   SubCutaneous three times a day before meals  insulin lispro (ADMELOG) corrective regimen sliding scale   SubCutaneous at bedtime  LORazepam     Tablet 1 milliGRAM(s) Oral every 4 hours  midodrine 10 milliGRAM(s) Oral every 8 hours  mineral oil/petrolatum Hydrophilic Ointment 1 Application(s) Topical daily  multivitamin 1 Tablet(s) Oral daily  nystatin Powder 1 Application(s) Topical three times a day  pantoprazole    Tablet 40 milliGRAM(s) Oral two times a day  polyethylene glycol 3350 17 Gram(s) Oral every 12 hours  senna 3 Tablet(s) Oral at bedtime  simethicone 80 milliGRAM(s) Chew every 6 hours  sucralfate suspension 1 Gram(s) Oral four times a day      ROS:  Unable to obtain due to : Pt's condition      Physical Exam:  Vital Signs Last 24 Hrs  T(C): 36.9 (12 Nov 2022 11:45), Max: 37.8 (12 Nov 2022 06:10)  T(F): 98.4 (12 Nov 2022 11:45), Max: 100 (12 Nov 2022 06:10)  HR: 76 (12 Nov 2022 12:00) (76 - 119)  BP: 104/61 (12 Nov 2022 12:00) (104/61 - 168/73)  BP(mean): 72 (12 Nov 2022 12:00) (72 - 77)  RR: 28 (12 Nov 2022 12:00) (28 - 41)  SpO2: 100% (12 Nov 2022 12:00) (93% - 100%)    Parameters below as of 12 Nov 2022 12:00    O2 Concentration (%): 70  Height (cm): 165.1 (11-12 @ 06:10)  Weight (kg): 57 (11-12 @ 06:22)  BMI (kg/m2): 20.9 (11-12 @ 06:22)  BSA (m2): 1.62 (11-12 @ 06:22)    General: vegetative state chronic vent  Neck: supple, trachea midline  Lungs: Decreased, no wheeze/rhonchi  Cardiovascular: regular rate and rhythm, S1 S2  Abdomen: soft, nontender, ND, bowel sounds normal + PEG  Neurological: Vegetative state  Skin: unstagable decub ulcer  Extremities: +edema Left great toe nonhealing ulcer c/d/i  Contractures    Labs:                         10.3   15.44 )-----------( 264      ( 12 Nov 2022 06:53 )             34.3     11-12    136  |  100  |  21  ----------------------------<  168<H>  5.3   |  30  |  1.18    Ca    9.4      12 Nov 2022 06:53    TPro  8.8<H>  /  Alb  2.3<L>  /  TBili  0.4  /  DBili  x   /  AST  18  /  ALT  15  /  AlkPhos  145<H>  11-12    RECENT CULTURES:          RADIOLOGY & ADDITIONAL STUDIES:    Assessment :   77YO F resident of [facility] PMHx of dementia, COPD with chronic respiratory failure s/p trach, cardiac arrest, CHH, quadriplegia, CVA, CKD, anemia, PEG tube, sacral decub,  multiple hospital admissions most recent admission for VAP CAUTI   discharged on Bactrim and Doxy po who presented to the hospital with cc of shortness of breath, low grade fever Tmax 100, midline malfunction, WBC 16. Pt is in vegetative state.   Pt high risk for recurrent infections   Multiple sources possible  Poss Recurrent VAP  Decub ulcers  or Leukocytosis poss reactive    Plan :   Start Meropenam and cont Bactrim - Based on most recent sputum cultures  Sp Vanc 1 gram x 1 dose  Fu cultures  Trend temps and cbc  Vent per Pulm   Full Code per family wishes    Continue with present regiment .  Approptiate use of antibiotics and adverse effects reviewed.      > 45 minutes spent in direct patient care reviewing  the notes, lab data/ imaging , discussion with multidisciplinary team. All questions were addressed and answered to the best of my capacity .    Thank you for allowing me to participate in the care of your patient .      Eusebio Chairez MD  Infectious Disease  [phone]
CARDIOLOGY CONSULT NOTE    Patient is a 76y Female with a known history of :    HPI:      REVIEW OF SYSTEMS:    CONSTITUTIONAL: No fever, weight loss, or fatigue  EYES: No eye pain, visual disturbances, or discharge  ENMT:  No difficulty hearing, tinnitus, vertigo; No sinus or throat pain  NECK: No pain or stiffness  BREASTS: No pain, masses, or nipple discharge  RESPIRATORY: No cough, wheezing, chills or hemoptysis; No shortness of breath  CARDIOVASCULAR: No chest pain, palpitations, dizziness, or leg swelling  GASTROINTESTINAL: No abdominal or epigastric pain. No nausea, vomiting, or hematemesis; No diarrhea or constipation. No melena or hematochezia.  GENITOURINARY: No dysuria, frequency, hematuria, or incontinence  NEUROLOGICAL: No headaches, memory loss, loss of strength, numbness, or tremors  SKIN: No itching, burning, rashes, or lesions   LYMPH NODES: No enlarged glands  ENDOCRINE: No heat or cold intolerance; No hair loss  MUSCULOSKELETAL: No joint pain or swelling; No muscle, back, or extremity pain  PSYCHIATRIC: No depression, anxiety, mood swings, or difficulty sleeping  HEME/LYMPH: No easy bruising, or bleeding gums  ALLERGY AND IMMUNOLOGIC: No hives or eczema    MEDICATIONS  (STANDING):  chlorhexidine 0.12% Liquid 15 milliLiter(s) Oral Mucosa every 12 hours    MEDICATIONS  (PRN):      ALLERGIES: codeine (Hives)      FAMILY HISTORY:  No pertinent family history in first degree relatives        Social History:  Alochol:   Smoking:   Drug Use:   Marital Status:     I&O's Detail      PHYSICAL EXAMINATION:  -----------------------------  T(C): 37.8 (11-12-22 @ 06:10), Max: 37.8 (11-12-22 @ 06:10)  HR: 90 (11-12-22 @ 08:30) (90 - 119)  BP: 122/57 (11-12-22 @ 08:30) (122/57 - 168/73)  RR: 37 (11-12-22 @ 08:30) (35 - 41)  SpO2: 100% (11-12-22 @ 08:30) (93% - 100%)  Wt(kg): --    Height (cm): 165.1 (11-12 @ 06:10)  Weight (kg): 57 (11-12 @ 06:22)  BMI (kg/m2): 20.9 (11-12 @ 06:22)  BSA (m2): 1.62 (11-12 @ 06:22)    Constitutional: well developed, normal appearance, well groomed, well nourished, no deformities and no acute distress.   Eyes: the conjunctiva exhibited no abnormalities and the eyelids demonstrated no xanthelasmas.   HEENT: normal oral mucosa, no oral pallor and no oral cyanosis.   Neck: normal jugular venous A waves present, normal jugular venous V waves present and no jugular venous obregon A waves.   Pulmonary: no respiratory distress, normal respiratory rhythm and effort, no accessory muscle use and lungs were clear to auscultation bilaterally.   Cardiovascular: heart rate and rhythm were normal, normal S1 and S2 and no murmur, gallop, rub, heave or thrill are present.   Abdomen: soft, non-tender, no hepato-splenomegaly and no abdominal mass palpated.   Musculoskeletal: the gait could not be assessed..   Extremities: no clubbing of the fingernails, no localized cyanosis, no petechial hemorrhages and no ischemic changes.   Skin: normal skin color and pigmentation, no rash, no venous stasis, no skin lesions, no skin ulcer and no xanthoma was observed.   Psychiatric: oriented to person, place, and time, the affect was normal, the mood was normal and not feeling anxious.     LABS:   --------  11-12    136  |  100  |  21  ----------------------------<  168<H>  5.3   |  30  |  1.18    Ca    9.4      12 Nov 2022 06:53    TPro  8.8<H>  /  Alb  2.3<L>  /  TBili  0.4  /  DBili  x   /  AST  18  /  ALT  15  /  AlkPhos  145<H>  11-12                         10.3   15.44 )-----------( 264      ( 12 Nov 2022 06:53 )             34.3     PT/INR - ( 12 Nov 2022 06:53 )   PT: 14.7 sec;   INR: 1.24 ratio         PTT - ( 12 Nov 2022 06:53 )  PTT:45.6 sec              RADIOLOGY:  -----------------        ECG: Poor baseline, sinus tachycardia, RBBB, no acute changes
Date/Time Patient Seen:  		  Referring MD:   Data Reviewed	       Patient is a 76y old  Female who presents with a chief complaint of     Subjective/HPI  er provider note reviewed  imaging reviewed  labs reviewed    old records reviewed  known to me from prior admissions       · Chief Complaint: The patient is a 76y Female complaining of  · Unable to Obtain: Dementia   · Details: trach/vented  · HPI Objective Statement: 76 year old female with PMHx of dementia, COPD with chronic respiratory failure s/p trach, cardiac arrest, CHH, quadriplegia, CVA, CKD, anemia, PEG tube, and multiple hospital admissions for respiratory distress brought in by EMS from [facility] for respiratory distress "all day" as per nursing facility reported by EMS.  On chronic vent. Has a MOST full code. No meds given prior to arrival by EMS    PAST MEDICAL & SURGICAL HISTORY:  Dementia of frontal lobe type    Aphasic stroke    Diabetes mellitus    Respiratory failure    Hypertension    GERD (gastroesophageal reflux disease)    Constipation    Respiratory failure    CVA (cerebral vascular accident)    HTN (hypertension)    DM (diabetes mellitus)    Advanced dementia    COVID-19 virus detected    Quadriplegia    Pneumonia    Type II diabetes mellitus    Hx of appendectomy    Gastrostomy in place    Tracheostomy in place    Tracheostomy tube present    Feeding by G-tube    FAMILY HISTORY:  No pertinent family history in first degree relatives. No pertinent family history of: n/a.     Tobacco Usage:  · Tobacco Usage	Unknown if ever smoked   · Unknown smoker reason	Cognitively Impaired     ALLERGIES AND HOME MEDICATIONS:   Allergies:        Allergies:  	codeine: Drug, Hives    Home Medications:   * Patient Currently Takes Medications as of 10-Nov-2022 11:37 documented in Structured Notes  · 	doxycycline 100 mg injection: 1 dose(s) injectable 2 times a day  	via midline through November 2nd  · 	sulfamethoxazole-trimethoprim 80 mg-16 mg/mL intravenous solution: 1 dose(s) intravenous 3 times a day  	(dose is 250mg based on trimethoprim concentrate (=15.625cc per dose) in 250 cc of Dextrose 5%) via midline  	through Noveber 2nd  · 	midodrine 10 mg oral tablet: 1 tab(s) orally every 8 hours  · 	insulin glargine 100 units/mL subcutaneous solution: 8 unit(s) subcutaneous once a day (in the morning)  · 	nystatin 100,000 units/g topical powder: 1 application topically 3 times a day  · 	Multiple Vitamins oral tablet: 1 tab(s) orally once a day  · 	LORazepam 1 mg oral tablet: 1 tab(s) by gastrostomy tube every 4 hours  · 	chlorhexidine 0.12% mucous membrane liquid: 15 milliliter(s) mucous membrane 2 times a day  · 	simethicone 80 mg oral tablet, chewable: 1 tab(s) by gastrostomy tube every 6 hours  · 	senna 8.6 mg oral tablet: 3 tab(s) by gastrostomy tube once a day (at bedtime)  · 	polyethylene glycol 3350 oral powder for reconstitution: 17 gram(s) by gastrostomy tube every 12 hours  · 	ipratropium-albuterol 0.5 mg-2.5 mg/3 mL inhalation solution: 3 milliliter(s) inhaled 4 times a day  · 	Bacid (LAC) oral tablet: 2 tab(s) by gastrostomy tube once a day  · 	Eucerin topical cream: Apply topically to affected area once a day bilateral feet  · 	albuterol 90 mcg/inh inhalation aerosol with adapter: 2  inhaled every 6 hours  · 	omeprazole 20 mg oral delayed release capsule: orally 2 times a day  · 	Betadine 10% topical swab: cleanse right and left great toes  · 	sucralfate 1 g/10 mL oral suspension: 10 milliliter(s) g-tube 4 times a day (before meals and at bedtime)    REVIEW OF SYSTEMS:    Review of Systems:  · UNABLE TO OBTAIN: Dementia   · Details: trach/vented    RESULTS:    Wet Read:  There are no Wet Read(s) to document.      Medication list         MEDICATIONS  (STANDING):  chlorhexidine 0.12% Liquid 15 milliLiter(s) Oral Mucosa every 12 hours  vancomycin  IVPB. 1000 milliGRAM(s) IV Intermittent once    MEDICATIONS  (PRN):         Vitals log        ICU Vital Signs Last 24 Hrs  T(C): 37.8 (12 Nov 2022 06:10), Max: 37.8 (12 Nov 2022 06:10)  T(F): 100 (12 Nov 2022 06:10), Max: 100 (12 Nov 2022 06:10)  HR: 115 (12 Nov 2022 06:48) (115 - 119)  BP: 168/73 (12 Nov 2022 06:10) (168/73 - 168/73)  BP(mean): --  ABP: --  ABP(mean): --  RR: 35 (12 Nov 2022 06:48) (35 - 36)  SpO2: 94% (12 Nov 2022 06:48) (93% - 95%)    O2 Parameters below as of 12 Nov 2022 06:48  Patient On (Oxygen Delivery Method): ventilator    O2 Concentration (%): 100         Mode: AC/ CMV (Assist Control/ Continuous Mandatory Ventilation)  RR (machine): 18  TV (machine): 400  FiO2: 100  PEEP: 5  ITime: 0.8  MAP: 46  PIP: 15      Input and Output:  I&O's Detail      Lab Data                        10.3   15.44 )-----------( 264      ( 12 Nov 2022 06:53 )             34.3                   Review of Systems	  weakness  resp failure      Objective     Physical Examination    heart s1s2  lung dec BS  head nc  trach  tachypnea      Pertinent Lab findings & Imaging      Woody:  NO   Adequate UO     I&O's Detail           Discussed with:     Cultures:	        Radiology    ACC: [id number] EXAM:  XR CHEST PORTABLE IMMED 1V                          PROCEDURE DATE:  11/08/2022          INTERPRETATION:  INDICATION: Status post central line    Portable chest 9:33 PM    COMPARISON: 11/4/2022    FINDINGS:  Heart/Vascular: The heart size, mediastinum, hilum and aorta are within   normal limits for projection.  Pulmonary: Midline trachea. Again seen are diffuse bilateral airspace   infiltrates. There is increased left perihilar and lower lobe   consolidation. Small pleural effusions. No pneumothorax.   Dilated esophagus again noted.  Bones: There is no fracture.  Lines and catheter: Tracheostomy in place. Tip of new left IJ catheter is   in the SVC without pneumothorax.    Impression:    Tip of left IJ catheter is in the SVC.    Again seen are diffuse bilateral airspace infiltrates.  There is increased left perihilar and lower lobe consolidation.  Small pleural effusions.  No pneumothorax.    --- End of Report ---             SHAYE BRAN DO; Attending Radiologist  This document has been electronically signed. Nov 9 2022  9:29AM                          
    BREA BECKHAM     [facility]    Allergies    codeine (Hives)    Intolerances        PAST MEDICAL & SURGICAL HISTORY:  Dementia of frontal lobe type      Aphasic stroke      Diabetes mellitus      Respiratory failure      Hypertension      GERD (gastroesophageal reflux disease)      Constipation      Respiratory failure      CVA (cerebral vascular accident)      HTN (hypertension)      DM (diabetes mellitus)      Advanced dementia      COVID-19 virus detected      Quadriplegia      Pneumonia      Type II diabetes mellitus      Hx of appendectomy      Gastrostomy in place      Tracheostomy in place      Tracheostomy tube present      Feeding by G-tube          FAMILY HISTORY:  No pertinent family history in first degree relatives        Home Medications:  albuterol 90 mcg/inh inhalation aerosol with adapter: 2  inhaled every 6 hours (:37)  Bacid (LAC) oral tablet: 2 tab(s) by gastrostomy tube once a day (:37)  bacitracin 500 units/g topical ointment: Apply topically to affected area once a day to knees (:45)  chlorhexidine 0.12% mucous membrane liquid: 15 milliliter(s) mucous membrane 2 times a day (:37)  Dakins Full Strength 0.5% topical solution: Apply topically to affected area 2 times a day then apply santyl and calcium alginate (:45)  Eucerin topical cream: Apply topically to affected area once a day bilateral feet (:37)  ferrous sulfate 325 mg (65 mg elemental iron) oral tablet: 1 tab(s) by gastrostomy tube once a day (:45)  insulin glargine 100 units/mL subcutaneous solution: 8 unit(s) subcutaneous once a day (in the morning) (:37)  ipratropium-albuterol 0.5 mg-2.5 mg/3 mL inhalation solution: 3 milliliter(s) inhaled every 6 hours (:45)  LORazepam 1 mg oral tablet: 1 tab(s) by gastrostomy tube every 4 hours (:45)  midodrine 10 mg oral tablet: 1 tab(s) by gastrostomy tube every 8 hours (:45)  mulivitamin: by gastrostomy tube once a day (:45)  nystatin 100,000 units/g topical powder: 1 application topically 3 times a day (:37)  omeprazole 40 mg oral delayed release capsule: 1 cap(s) by gastrostomy tube 2 times a day (2022 09:45)  polyethylene glycol 3350 oral powder for reconstitution: 17 gram(s) by gastrostomy tube every 12 hours (2022 07:37)  senna 8.6 mg oral tablet: 3 tab(s) by gastrostomy tube once a day (at bedtime) (:37)  simethicone 80 mg oral tablet, chewable: 1 tab(s) by gastrostomy tube every 6 hours (:37)  sucralfate 1 g/10 mL oral suspension: 10 milliliter(s) g-tube 4 times a day (before meals and at bedtime) (:37)  Tylenol 325 mg oral tablet: 2 tab(s) orally every 6 hours, As Needed prior to dressing change (:45)      MEDICATIONS  (STANDING):  albuterol    90 MICROgram(s) HFA Inhaler 2 Puff(s) Inhalation two times a day  albuterol/ipratropium for Nebulization 3 milliLiter(s) Nebulizer every 6 hours  bacitracin   Ointment 1 Application(s) Topical daily  chlorhexidine 0.12% Liquid 15 milliLiter(s) Oral Mucosa every 12 hours  Dakins Solution - Full Strength 1 Application(s) Topical two times a day  dextrose 5%. 1000 milliLiter(s) (50 mL/Hr) IV Continuous <Continuous>  dextrose 5%. 1000 milliLiter(s) (100 mL/Hr) IV Continuous <Continuous>  dextrose 50% Injectable 25 Gram(s) IV Push once  dextrose 50% Injectable 12.5 Gram(s) IV Push once  dextrose 50% Injectable 25 Gram(s) IV Push once  ferrous    sulfate 325 milliGRAM(s) Oral daily  glucagon  Injectable 1 milliGRAM(s) IntraMuscular once  heparin   Injectable 5000 Unit(s) SubCutaneous every 12 hours  insulin glargine Injectable (LANTUS) 8 Unit(s) SubCutaneous every morning  insulin lispro (ADMELOG) corrective regimen sliding scale   SubCutaneous three times a day before meals  insulin lispro (ADMELOG) corrective regimen sliding scale   SubCutaneous at bedtime  lactobacillus acidophilus 2 Tablet(s) Oral daily  LORazepam     Tablet 1 milliGRAM(s) Oral every 4 hours  meropenem  IVPB      midodrine 10 milliGRAM(s) Oral every 8 hours  mineral oil/petrolatum Hydrophilic Ointment 1 Application(s) Topical daily  multivitamin 1 Tablet(s) Oral daily  nystatin Powder 1 Application(s) Topical three times a day  pantoprazole    Tablet 40 milliGRAM(s) Oral two times a day  piperacillin/tazobactam IVPB.. 3.375 Gram(s) IV Intermittent every 8 hours  polyethylene glycol 3350 17 Gram(s) Oral every 12 hours  senna 3 Tablet(s) Oral at bedtime  simethicone 80 milliGRAM(s) Chew every 6 hours  sucralfate suspension 1 Gram(s) Oral four times a day  trimethoprim   80 mG/sulfamethoxazole 400 mG 1 Tablet(s) Oral two times a day    MEDICATIONS  (PRN):  acetaminophen     Tablet .. 650 milliGRAM(s) Oral every 6 hours PRN Temp greater or equal to 38C (100.4F), Mild Pain (1 - 3)  dextrose Oral Gel 15 Gram(s) Oral once PRN Blood Glucose LESS THAN 70 milliGRAM(s)/deciliter      Diet, NPO with Tube Feed:   Tube Feeding Modality: Gastrostomy  Jevity 1.5 Horacio  Total Volume for 24 Hours (mL): 720  Continuous  Starting Tube Feed Rate mL per Hour: 30  Until Goal Tube Feed Rate (mL per Hour): 30  Tube Feed Duration (in Hours): 24  Tube Feed Start Time: 15:00 (22 @ 13:52) [Active]          Vital Signs Last 24 Hrs  T(C): 36.9 (2022 11:45), Max: 37.8 (2022 06:10)  T(F): 98.4 (2022 11:45), Max: 100 (2022 06:10)  HR: 76 (2022 12:00) (76 - 119)  BP: 104/61 (2022 12:00) (104/61 - 168/73)  BP(mean): 72 (2022 12:00) (72 - 77)  RR: 28 (2022 12:00) (28 - 41)  SpO2: 100% (2022 12:00) (93% - 100%)    Parameters below as of 2022 12:00      O2 Concentration (%): 70        Mode: AC/ CMV (Assist Control/ Continuous Mandatory Ventilation), RR (machine): 18, TV (machine): 400, FiO2: 100, PEEP: 5, ITime: 1, MAP: 21, PIP: 48      LABS:                        10.3   15.44 )-----------( 264      ( 2022 06:53 )             34.3         136  |  100  |  21  ----------------------------<  168<H>  5.3   |  30  |  1.18    Ca    9.4      2022 06:53    TPro  8.8<H>  /  Alb  2.3<L>  /  TBili  0.4  /  DBili  x   /  AST  18  /  ALT  15  /  AlkPhos  145<H>      PT/INR - ( 2022 06:53 )   PT: 14.7 sec;   INR: 1.24 ratio         PTT - ( 2022 06:53 )  PTT:45.6 sec  Urinalysis Basic - ( 2022 06:53 )    Color: Yellow / Appearance: Clear / S.020 / pH: x  Gluc: x / Ketone: Negative  / Bili: Negative / Urobili: Negative mg/dL   Blood: x / Protein: 100 mg/dL / Nitrite: Negative   Leuk Esterase: Trace / RBC: 0-2 /HPF / WBC 6-10   Sq Epi: x / Non Sq Epi: Few / Bacteria: Few            WBC:  WBC Count: 15.44 K/uL ( @ 06:53)  WBC Count: 4.56 K/uL (11-10 @ 06:19)  WBC Count: 4.61 K/uL ( @ 05:55)      MICROBIOLOGY:  RECENT CULTURES:   .Abscess foot Acinetobacter baumannii (Carbapenem Resistant)  Methicillin resistant Staphylococcus aureus XXXX   Numerous Acinetobacter baumannii (Carbapenem Resistant) /nosocomialis  group  Few Candida tropicalis "Susceptibilities not performed"  Few Methicillin Resistant Staphylococcus aureus                PT/INR - ( 2022 06:53 )   PT: 14.7 sec;   INR: 1.24 ratio         PTT - ( 2022 06:53 )  PTT:45.6 sec    Sodium:  Sodium, Serum: 136 mmol/L ( 06:53)  Sodium, Serum: 139 mmol/L (11-10 @ 06:19)  Sodium, Serum: 138 mmol/L ( @ 05:55)      1.18 mg/dL :53  1.07 mg/dL 11-10 @ 06:19  0.99 mg/dL :55      Hemoglobin:  Hemoglobin: 10.3 g/dL (:53)  Hemoglobin: 8.7 g/dL (11-10 @ 06:19)  Hemoglobin: 8.6 g/dL ( 05:55)      Platelets: Platelet Count - Automated: 264 K/uL (:53)  Platelet Count - Automated: 164 K/uL (11-10 @ 06:19)  Platelet Count - Automated: 180 K/uL ( @ 05:55)      LIVER FUNCTIONS - ( 2022 06:53 )  Alb: 2.3 g/dL / Pro: 8.8 g/dL / ALK PHOS: 145 U/L / ALT: 15 U/L DA / AST: 18 U/L / GGT: x             Urinalysis Basic - ( 2022 06:53 )    Color: Yellow / Appearance: Clear / S.020 / pH: x  Gluc: x / Ketone: Negative  / Bili: Negative / Urobili: Negative mg/dL   Blood: x / Protein: 100 mg/dL / Nitrite: Negative   Leuk Esterase: Trace / RBC: 0-2 /HPF / WBC 6-10   Sq Epi: x / Non Sq Epi: Few / Bacteria: Few        RADIOLOGY & ADDITIONAL STUDIES:      MICROBIOLOGY:  RECENT CULTURES:   .Abscess foot Acinetobacter baumannii (Carbapenem Resistant)  Methicillin resistant Staphylococcus aureus XXXX   Numerous Acinetobacter baumannii (Carbapenem Resistant) /nosocomialis  group  Few Candida tropicalis "Susceptibilities not performed"  Few Methicillin Resistant Staphylococcus aureus            
  Chief Complaint:  Patient is a 76y old  Female who presents with a chief complaint of sepsis, leukocytosis, midline adjustment leaky peg      Allergies:  codeine (Hives)      Medications:  acetaminophen     Tablet .. 650 milliGRAM(s) Oral every 6 hours PRN  albuterol    90 MICROgram(s) HFA Inhaler 2 Puff(s) Inhalation two times a day  bacitracin   Ointment 1 Application(s) Topical daily  chlorhexidine 0.12% Liquid 15 milliLiter(s) Oral Mucosa every 12 hours  chlorhexidine 2% Cloths 1 Application(s) Topical <User Schedule>  Dakins Solution - Full Strength 1 Application(s) Topical two times a day  dextrose 5%. 1000 milliLiter(s) IV Continuous <Continuous>  dextrose 5%. 1000 milliLiter(s) IV Continuous <Continuous>  dextrose 50% Injectable 25 Gram(s) IV Push once  dextrose 50% Injectable 12.5 Gram(s) IV Push once  dextrose 50% Injectable 25 Gram(s) IV Push once  dextrose Oral Gel 15 Gram(s) Oral once PRN  ferrous    sulfate 325 milliGRAM(s) Oral daily  glucagon  Injectable 1 milliGRAM(s) IntraMuscular once  heparin   Injectable 5000 Unit(s) SubCutaneous every 12 hours  insulin glargine Injectable (LANTUS) 8 Unit(s) SubCutaneous every morning  insulin lispro (ADMELOG) corrective regimen sliding scale   SubCutaneous every 6 hours  lactated ringers. 1000 milliLiter(s) IV Continuous <Continuous>  lactobacillus acidophilus 2 Tablet(s) Oral daily  LORazepam     Tablet 1 milliGRAM(s) Oral every 4 hours  LORazepam   Injectable 2 milliGRAM(s) IV Push every 6 hours PRN  meropenem  IVPB      meropenem  IVPB 1000 milliGRAM(s) IV Intermittent every 12 hours  midodrine 10 milliGRAM(s) Oral every 8 hours  mineral oil/petrolatum Hydrophilic Ointment 1 Application(s) Topical daily  multivitamin 1 Tablet(s) Oral daily  nystatin Powder 1 Application(s) Topical three times a day  pantoprazole  Injectable 40 milliGRAM(s) IV Push daily  polyethylene glycol 3350 17 Gram(s) Oral every 12 hours  povidone iodine 10% Solution 1 Application(s) Topical three times a day  senna 3 Tablet(s) Oral at bedtime  simethicone 80 milliGRAM(s) Chew every 6 hours  sucralfate suspension 1 Gram(s) Oral four times a day  trimethoprim   80 mG/sulfamethoxazole 400 mG 1 Tablet(s) Oral two times a day      PMHX/PSHX:  Dementia of frontal lobe type    Aphasic stroke    Diabetes mellitus    Respiratory failure    Hypertension    GERD (gastroesophageal reflux disease)    Constipation    Respiratory failure    CVA (cerebral vascular accident)    HTN (hypertension)    DM (diabetes mellitus)    Advanced dementia    COVID-19 virus detected    Quadriplegia    Pneumonia    Type II diabetes mellitus    Hx of appendectomy    Gastrostomy in place    Tracheostomy in place    Tracheostomy tube present    Feeding by G-tube        Family history:  No pertinent family history in first degree relatives    No pertinent family history in first degree relatives    No pertinent family history in first degree relatives    No pertinent family history in first degree relatives    No pertinent family history in first degree relatives        Social History:     ROS:     General:  No wt loss, fevers, chills, night sweats, fatigue,   Eyes:  Good vision, no reported pain  ENT:  No sore throat, pain, runny nose, dysphagia  CV:  No pain, palpitations, hypo/hypertension  Resp:  No dyspnea, cough, tachypnea, wheezing  GI:  No pain, No nausea, No vomiting, No diarrhea, No constipation, No weight loss, No fever, No pruritis, No rectal bleeding, No tarry stools, No dysphagia,  :  No pain, bleeding, incontinence, nocturia  Muscle:  No pain, weakness  Neuro:  No weakness, tingling, memory problems  Psych:  No fatigue, insomnia, mood problems, depression  Endocrine:  No polyuria, polydipsia, cold/heat intolerance  Heme:  No petechiae, ecchymosis, easy bruisability  Skin:  No rash, tattoos, scars, edema      PHYSICAL EXAM:   Vital Signs:  Vital Signs Last 24 Hrs  T(C): 37 (2022 19:32), Max: 37.7 (2022 23:00)  T(F): 98.6 (2022 19:32), Max: 99.8 (2022 23:00)  HR: 59 (2022 20:00) (54 - 130)  BP: 111/52 (2022 20:00) (88/43 - 178/83)  BP(mean): 71 (2022 20:00) (57 - 112)  RR: 14 (2022 20:00) (13 - 44)  SpO2: 100% (2022 20:00) (92% - 100%)    Parameters below as of 2022 20:00  Patient On (Oxygen Delivery Method): ventilator    O2 Concentration (%): 40  Daily     Daily Weight in k.1 (2022 05:00)    GENERAL:  Appears stated age, well-groomed, well-nourished, no distress  HEENT:  NC/AT,  conjunctivae clear and pink, no thyromegaly, nodules, adenopathy, no JVD, sclera -anicteric  CHEST:  Full & symmetric excursion, no increased effort, breath sounds clear  HEART:  Regular rhythm, S1, S2, no murmur/rub/S3/S4, no abdominal bruit, no edema  ABDOMEN:  Soft, non-tender, non-distended, normoactive bowel sounds,  no masses ,no hepato-splenomegaly, no signs of chronic liver disease  EXTEREMITIES:  no cyanosis,clubbing or edema  SKIN:  No rash/erythema/ecchymoses/petechiae/wounds/abscess/warm/dry  NEURO:  Alert, oriented, no asterixis, no tremor, no encephalopathy    LABS:                        8.0    9.05  )-----------( 175      ( 2022 12:33 )             26.3     11-    139  |  103  |  24<H>  ----------------------------<  124<H>  4.3   |  30  |  1.27    Ca    8.4      2022 06:48  Phos  2.6     11-    TPro  6.6  /  Alb  1.8<L>  /  TBili  0.3  /  DBili  x   /  AST  28  /  ALT  13  /  AlkPhos  106  11-13    LIVER FUNCTIONS - ( 2022 06:48 )  Alb: 1.8 g/dL / Pro: 6.6 g/dL / ALK PHOS: 106 U/L / ALT: 13 U/L DA / AST: 28 U/L / GGT: x           PT/INR - ( 2022 06:48 )   PT: 15.9 sec;   INR: 1.38 ratio         PTT - ( 2022 06:53 )  PTT:45.6 sec  Urinalysis Basic - ( 2022 06:53 )    Color: Yellow / Appearance: Clear / S.020 / pH: x  Gluc: x / Ketone: Negative  / Bili: Negative / Urobili: Negative mg/dL   Blood: x / Protein: 100 mg/dL / Nitrite: Negative   Leuk Esterase: Trace / RBC: 0-2 /HPF / WBC 6-10   Sq Epi: x / Non Sq Epi: Few / Bacteria: Few          Imaging:          
Patient is a 76y old  Female who presents with a chief complaint of sepsis, leukocytosis, midline adjustment (14 Nov 2022 09:59)    HPI:  76 year old female with PMHx of dementia, COPD with chronic respiratory failure s/p trach, cardiac arrest, CHH, quadriplegia, CVA, CKD, anemia, PEG tube, and multiple hospital admissions for respiratory distress presents to the ED BIBEMS from [facility] for shortness of breath, found ot have low grade fever, midline malfunction, and leukocytosis to 16, fever, concerning for sepsis.  Patient was discharged 2 days ago, but developed this morning increased tachypnea, respiratory distress and it appeared her midline was no longer working, and patient might not be able to receive the antibiotics bactrim and doxycycline which had been prescribed for her to continue in outpt setting after discharge form the hospital.   (12 Nov 2022 08:42)    Renal consult called for abnormal renal function. History obtained from chart.       PAST MEDICAL HISTORY:  Dementia of frontal lobe type    Aphasic stroke    Diabetes mellitus    Respiratory failure    Hypertension    GERD (gastroesophageal reflux disease)    Constipation    Respiratory failure    CVA (cerebral vascular accident)    HTN (hypertension)    DM (diabetes mellitus)    Advanced dementia    COVID-19 virus detected    Quadriplegia    Pneumonia    Type II diabetes mellitus        PAST SURGICAL HISTORY:  Hx of appendectomy    Gastrostomy in place    Tracheostomy in place    Tracheostomy tube present    Feeding by G-tube        FAMILY HISTORY:  No pertinent family history in first degree relatives        SOCIAL HISTORY: No smoking or alcohol use     Allergies    codeine (Hives)    Intolerances      Home Medications:  albuterol 90 mcg/inh inhalation aerosol with adapter: 2  inhaled every 6 hours (12 Nov 2022 07:37)  Bacid (LAC) oral tablet: 2 tab(s) by gastrostomy tube once a day (12 Nov 2022 07:37)  bacitracin 500 units/g topical ointment: Apply topically to affected area once a day to knees (12 Nov 2022 09:45)  chlorhexidine 0.12% mucous membrane liquid: 15 milliliter(s) mucous membrane 2 times a day (12 Nov 2022 07:37)  Dakins Full Strength 0.5% topical solution: Apply topically to affected area 2 times a day then apply santyl and calcium alginate (12 Nov 2022 09:45)  Eucerin topical cream: Apply topically to affected area once a day bilateral feet (12 Nov 2022 07:37)  ferrous sulfate 325 mg (65 mg elemental iron) oral tablet: 1 tab(s) by gastrostomy tube once a day (12 Nov 2022 09:45)  insulin glargine 100 units/mL subcutaneous solution: 8 unit(s) subcutaneous once a day (in the morning) (12 Nov 2022 07:37)  ipratropium-albuterol 0.5 mg-2.5 mg/3 mL inhalation solution: 3 milliliter(s) inhaled every 6 hours (12 Nov 2022 09:45)  LORazepam 1 mg oral tablet: 1 tab(s) by gastrostomy tube every 4 hours (12 Nov 2022 09:45)  midodrine 10 mg oral tablet: 1 tab(s) by gastrostomy tube every 8 hours (12 Nov 2022 09:45)  mulivitamin: by gastrostomy tube once a day (12 Nov 2022 09:45)  nystatin 100,000 units/g topical powder: 1 application topically 3 times a day (12 Nov 2022 07:37)  omeprazole 40 mg oral delayed release capsule: 1 cap(s) by gastrostomy tube 2 times a day (12 Nov 2022 09:45)  polyethylene glycol 3350 oral powder for reconstitution: 17 gram(s) by gastrostomy tube every 12 hours (12 Nov 2022 07:37)  senna 8.6 mg oral tablet: 3 tab(s) by gastrostomy tube once a day (at bedtime) (12 Nov 2022 07:37)  simethicone 80 mg oral tablet, chewable: 1 tab(s) by gastrostomy tube every 6 hours (12 Nov 2022 07:37)  sucralfate 1 g/10 mL oral suspension: 10 milliliter(s) g-tube 4 times a day (before meals and at bedtime) (12 Nov 2022 07:37)  Tylenol 325 mg oral tablet: 2 tab(s) orally every 6 hours, As Needed prior to dressing change (12 Nov 2022 09:45)    MEDICATIONS  (STANDING):  albuterol    90 MICROgram(s) HFA Inhaler 2 Puff(s) Inhalation two times a day  bacitracin   Ointment 1 Application(s) Topical daily  chlorhexidine 0.12% Liquid 15 milliLiter(s) Oral Mucosa every 12 hours  chlorhexidine 2% Cloths 1 Application(s) Topical <User Schedule>  Dakins Solution - Full Strength 1 Application(s) Topical two times a day  dextrose 5%. 1000 milliLiter(s) (50 mL/Hr) IV Continuous <Continuous>  dextrose 5%. 1000 milliLiter(s) (100 mL/Hr) IV Continuous <Continuous>  dextrose 50% Injectable 25 Gram(s) IV Push once  dextrose 50% Injectable 12.5 Gram(s) IV Push once  dextrose 50% Injectable 25 Gram(s) IV Push once  ferrous    sulfate 325 milliGRAM(s) Oral daily  glucagon  Injectable 1 milliGRAM(s) IntraMuscular once  heparin   Injectable 5000 Unit(s) SubCutaneous every 12 hours  insulin glargine Injectable (LANTUS) 8 Unit(s) SubCutaneous every morning  insulin lispro (ADMELOG) corrective regimen sliding scale   SubCutaneous every 6 hours  lactated ringers. 1000 milliLiter(s) (50 mL/Hr) IV Continuous <Continuous>  lactobacillus acidophilus 2 Tablet(s) Oral daily  LORazepam     Tablet 1 milliGRAM(s) Oral every 4 hours  meropenem  IVPB      meropenem  IVPB 1000 milliGRAM(s) IV Intermittent every 12 hours  midodrine 10 milliGRAM(s) Oral every 8 hours  mineral oil/petrolatum Hydrophilic Ointment 1 Application(s) Topical daily  multivitamin 1 Tablet(s) Oral daily  nystatin Powder 1 Application(s) Topical three times a day  pantoprazole  Injectable 40 milliGRAM(s) IV Push daily  polyethylene glycol 3350 17 Gram(s) Oral every 12 hours  povidone iodine 10% Solution 1 Application(s) Topical three times a day  senna 3 Tablet(s) Oral at bedtime  simethicone 80 milliGRAM(s) Chew every 6 hours  sucralfate suspension 1 Gram(s) Oral four times a day  trimethoprim   80 mG/sulfamethoxazole 400 mG 1 Tablet(s) Oral two times a day    MEDICATIONS  (PRN):  acetaminophen     Tablet .. 650 milliGRAM(s) Oral every 6 hours PRN Temp greater or equal to 38C (100.4F), Mild Pain (1 - 3)  dextrose Oral Gel 15 Gram(s) Oral once PRN Blood Glucose LESS THAN 70 milliGRAM(s)/deciliter  LORazepam   Injectable 2 milliGRAM(s) IV Push every 6 hours PRN Agitation      REVIEW OF SYSTEMS:  General: + trach    T(F): 97.9 (11-14-22 @ 08:30), Max: 100.2 (11-14-22 @ 00:27)  HR: 53 (11-14-22 @ 10:21) (48 - 97)  BP: 124/59 (11-14-22 @ 10:00) (95/48 - 136/63)  RR: 22 (11-14-22 @ 10:00) (12 - 26)  SpO2: 99% (11-14-22 @ 10:21) (93% - 100%)  Wt(kg): --    PHYSICAL EXAM:  General: + trach  Respiratory: b/l air entry  Cardiovascular: S1 S2  Gastrointestinal: soft, peg  Extremities: edema    Mode: AC/ CMV (Assist Control/ Continuous Mandatory Ventilation)  RR (machine): 18  TV (machine): 400  FiO2: 40  PEEP: 5  ITime: 1  MAP: 13  PIP: 34      11-14    139  |  103  |  21  ----------------------------<  119<H>  4.2   |  30  |  1.31<H>    Ca    7.8<L>      14 Nov 2022 05:30  Phos  2.6     11-13    TPro  6.4  /  Alb  1.7<L>  /  TBili  0.6  /  DBili  x   /  AST  21  /  ALT  <10<L>  /  AlkPhos  93  11-14                          7.7    9.41  )-----------( 155      ( 14 Nov 2022 05:30 )             25.2       Hemoglobin: 7.7 g/dL (11-14 @ 05:30)  Hematocrit: 25.2 % (11-14 @ 05:30)  Calcium, Total Serum: 7.8 mg/dL (11-14 @ 05:30)  Blood Urea Nitrogen, Serum: 21 mg/dL (11-14 @ 05:30)      Creatinine, Serum: 1.31 (11-14 @ 05:30)  Creatinine, Serum: 1.27 (11-13 @ 06:48)  Creatinine, Serum: 1.18 (11-12 @ 06:53)        LIVER FUNCTIONS - ( 14 Nov 2022 05:30 )  Alb: 1.7 g/dL / Pro: 6.4 g/dL / ALK PHOS: 93 U/L / ALT: <10 U/L DA / AST: 21 U/L / GGT: x                       I&O's Detail    13 Nov 2022 07:01  -  14 Nov 2022 07:00  --------------------------------------------------------  IN:    IV PiggyBack: 100 mL    Lactated Ringers: 1150 mL  Total IN: 1250 mL    OUT:    Indwelling Catheter - Urethral (mL): 650 mL    Stool (mL): 3 mL  Total OUT: 653 mL    Total NET: 597 mL            Culture - Sputum (collected 13 Nov 2022 01:44)  Source: Trach Asp Tracheal Aspirate  Gram Stain (13 Nov 2022 21:20):    No polymorphonuclear leukocytes per low power field    No Squamous epithelial cells per low power field    No organisms seen per oil power field  Preliminary Report (14 Nov 2022 07:37):    Normal Respiratory Elvira present    Culture - Urine (collected 12 Nov 2022 06:53)  Source: Clean Catch Clean Catch (Midstream)  Final Report (14 Nov 2022 00:07):    <10,000 CFU/mL Normal Urogenital Elvira    Culture - Blood (collected 12 Nov 2022 06:53)  Source: .Blood Blood-Peripheral  Preliminary Report (13 Nov 2022 13:01):    No growth to date.    Culture - Blood (collected 12 Nov 2022 06:53)  Source: .Blood Blood-Peripheral  Preliminary Report (13 Nov 2022 13:01):    No growth to date.    Culture - Abscess with Gram Stain (collected 07 Nov 2022 23:56)  Source: .Abscess foot  Final Report (13 Nov 2022 08:25):    Numerous Acinetobacter baumannii (Carbapenem Resistant) /nosocomialis    group    Few Candida tropicalis "Susceptibilities not performed"    Few Methicillin Resistant Staphylococcus aureus  Organism: Acinetobacter baumannii (Carbapenem Resistant)  Methicillin resistant Staphylococcus aureus (13 Nov 2022 08:25)  Organism: Methicillin resistant Staphylococcus aureus (13 Nov 2022 08:25)    Sensitivities:      -  Ampicillin/Sulbactam: R 16/8      -  Cefazolin: R >16      -  Clindamycin: S <=0.25      -  Daptomycin: S 1      -  Erythromycin: R >4      -  Gentamicin: S <=1 Should not be used as monotherapy      -  Linezolid: S 2      -  Oxacillin: R >2      -  Penicillin: R >8      -  Rifampin: S <=1 Should not be used as monotherapy      -  Tetracycline: S <=1      -  Trimethoprim/Sulfamethoxazole: S <=0.5/9.5      -  Vancomycin: S 1      Method Type: PRATIK  Organism: Acinetobacter baumannii (Carbapenem Resistant) (13 Nov 2022 08:25)    Sensitivities:      -  Polymyxin B: R 4      Method Type: ETEST  Organism: Acinetobacter baumannii (Carbapenem Resistant) (13 Nov 2022 08:25)    Sensitivities:      -  Minocycline: S      -  Piperacillin/Tazobactam: R      Method Type: KB  Organism: Acinetobacter baumannii (Carbapenem Resistant) (13 Nov 2022 08:25)    Sensitivities:      -  Amikacin: R >32      -  Ampicillin/Sulbactam: S 8/4      -  Cefepime: R >16      -  Ceftazidime: R >16      -  Ciprofloxacin: R >2      -  Gentamicin: R >8      -  Imipenem: R >8      -  Levofloxacin: R >4      -  Meropenem: R >8      -  Tobramycin: S <=2      -  Trimethoprim/Sulfamethoxazole: R >2/38      Method Type: PRATIK      < from: Xray Chest 1 View AP/PA (11.12.22 @ 08:39) >    ACC: [id number] EXAM:  XR CHEST AP OR PA 1V                          PROCEDURE DATE:  11/12/2022          INTERPRETATION:  Chest AP portable        CLINICAL HISTORY: Respiratory distress, sepsis    COMPARISON: 11/8/2022    FINDINGS: Diffuse bilaterallung infiltrates are again apparent with   associated right-sided pleural effusion. The overall pattern is not   significantly changed.    Tracheostomy is in place.    No change in the heart or mediastinal configuration allowing for   technique.    IMPRESSION: Limited portable examination with diffuse lung infiltrates.   No significant change.    --- End of Report ---            JOSHUA JAMES MD; Attending Radiologist  This document has been electronically signed. Nov 12 2022  1:13PM    < end of copied text >            
HPI  location, quality, severity, duration, timing, context, modifying factors, associated signs/symptoms  HPI:  76 year old female with PMHx of dementia, COPD with chronic respiratory failure s/p trach, cardiac arrest, CHH, quadriplegia, CVA, CKD, anemia, PEG tube, and multiple hospital admissions for respiratory distress presents to the ED BIBEMS from [facility] for shortness of breath, found ot have low grade fever, midline malfunction, and leukocytosis to 16, fever, concerning for sepsis.  Patient was discharged 2 days ago, but developed this morning increased tachypnea, respiratory distress and it appeared her midline was no longer working, and patient might not be able to receive the antibiotics bactrim and doxycycline which had been prescribed for her to continue in outpt setting after discharge form the hospital.   (12 Nov 2022 08:42) on admission presents with chronic Gluteal fold unstageable pressure injury 4 x 3.5 x 2.5 tan slough  probe to the [id number]% scant/moderate serosanguinous drainage no odor, periwound erythema, maceration:,  Left hallux unstageable pressure injury 1.5 x 1.5 dry, periwound dry mild erythema, Right hallux unstageable pressure injury 1.5 x 1.5 dry, periwound dry mild erythema        PAST MEDICAL & SURGICAL HISTORY:  Dementia of frontal lobe type      Aphasic stroke      Diabetes mellitus      Respiratory failure      Hypertension      GERD (gastroesophageal reflux disease)      Constipation      Respiratory failure      CVA (cerebral vascular accident)      HTN (hypertension)      DM (diabetes mellitus)      Advanced dementia      COVID-19 virus detected      Quadriplegia      Pneumonia      Type II diabetes mellitus      Hx of appendectomy      Gastrostomy in place      Tracheostomy in place      Tracheostomy tube present      Feeding by G-tube        REVIEW OF SYSTEMS  Patient is unable to provide any information/ROS  due to baseline mental status      MEDICATIONS  (STANDING):  albuterol    90 MICROgram(s) HFA Inhaler 2 Puff(s) Inhalation two times a day  aluminum hydroxide/magnesium hydroxide/simethicone Suspension 30 milliLiter(s) Oral every 8 hours  bacitracin   Ointment 1 Application(s) Topical daily  chlorhexidine 0.12% Liquid 15 milliLiter(s) Oral Mucosa every 12 hours  chlorhexidine 2% Cloths 1 Application(s) Topical <User Schedule>  Dakins Solution - Full Strength 1 Application(s) Topical two times a day  dextrose 5%. 1000 milliLiter(s) (100 mL/Hr) IV Continuous <Continuous>  dextrose 5%. 1000 milliLiter(s) (50 mL/Hr) IV Continuous <Continuous>  dextrose 50% Injectable 25 Gram(s) IV Push once  dextrose 50% Injectable 12.5 Gram(s) IV Push once  dextrose 50% Injectable 25 Gram(s) IV Push once  ferrous    sulfate 325 milliGRAM(s) Oral daily  glucagon  Injectable 1 milliGRAM(s) IntraMuscular once  heparin   Injectable 5000 Unit(s) SubCutaneous every 12 hours  insulin glargine Injectable (LANTUS) 8 Unit(s) SubCutaneous every morning  insulin lispro (ADMELOG) corrective regimen sliding scale   SubCutaneous every 6 hours  lactated ringers. 1000 milliLiter(s) (50 mL/Hr) IV Continuous <Continuous>  lactobacillus acidophilus 2 Tablet(s) Oral daily  LORazepam     Tablet 1 milliGRAM(s) Oral every 4 hours  midodrine 10 milliGRAM(s) Oral every 8 hours  mineral oil/petrolatum Hydrophilic Ointment 1 Application(s) Topical daily  multivitamin 1 Tablet(s) Oral daily  nystatin Powder 1 Application(s) Topical three times a day  pantoprazole  Injectable 40 milliGRAM(s) IV Push daily  polyethylene glycol 3350 17 Gram(s) Oral every 12 hours  povidone iodine 10% Solution 1 Application(s) Topical three times a day  senna 3 Tablet(s) Oral at bedtime  simethicone 80 milliGRAM(s) Chew every 6 hours  sucralfate suspension 1 Gram(s) Oral four times a day  vancomycin  IVPB      vancomycin  IVPB 750 milliGRAM(s) IV Intermittent every 24 hours    MEDICATIONS  (PRN):  acetaminophen     Tablet .. 650 milliGRAM(s) Oral every 6 hours PRN Temp greater or equal to 38C (100.4F), Mild Pain (1 - 3)  dextrose Oral Gel 15 Gram(s) Oral once PRN Blood Glucose LESS THAN 70 milliGRAM(s)/deciliter  LORazepam   Injectable 2 milliGRAM(s) IV Push every 6 hours PRN Agitation      Allergies    codeine (Hives)    Intolerances        SOCIAL HISTORY:      FAMILY HISTORY:  No pertinent family history in first degree relatives        Vital Signs Last 24 Hrs  T(C): 36.6 (15 Nov 2022 08:35), Max: 36.6 (14 Nov 2022 12:30)  T(F): 97.9 (15 Nov 2022 08:35), Max: 97.9 (15 Nov 2022 08:35)  HR: 55 (15 Nov 2022 10:21) (46 - 67)  BP: 116/65 (15 Nov 2022 08:00) (98/56 - 149/79)  BP(mean): 80 (15 Nov 2022 08:00) (70 - 101)  RR: 16 (15 Nov 2022 08:00) (14 - 26)  SpO2: 100% (15 Nov 2022 10:21) (96% - 100%)    Parameters below as of 15 Nov 2022 08:00  Patient On (Oxygen Delivery Method): ventilator    O2 Concentration (%): 40                          8.6    6.32  )-----------( 163      ( 15 Nov 2022 06:31 )             28.0     11-15    137  |  103  |  18  ----------------------------<  93  3.8   |  29  |  1.04    Ca    7.9<L>      15 Nov 2022 06:31    TPro  6.4  /  Alb  1.7<L>  /  TBili  0.6  /  DBili  x   /  AST  21  /  ALT  <10<L>  /  AlkPhos  93  11-14      A1C with Estimated Average Glucose Result: 8.5 % (10-19-22 @ 09:05)  A1C with Estimated Average Glucose Result: 7.3 % (08-19-22 @ 06:36)      PHYSICAL EXAM:    General: resting comfortably in bed; NAD  ENT: no nasal discharge;   Neck: trach in place/vented  Extremities: no gross deformities,  Dermatologic: chronic Gluteal fold unstageable pressure injury 4 x 3.5 x 2.5 tan slough  probe to the [id number]% scant/moderate serosanguinous drainage no odor, warmth, induration, fluctuance, periwound erythema, maceration:,  Left hallux unstageable pressure injury 1.5 x 1.5 dry, periwound dry mild erythema, Right hallux unstageable pressure injury 1.5 x 1.5 dry, periwound dry mild erythema  Neurologic: Can not follow commands  Musculoskeletal/Vascular: Foot drop    frail,  ecchymosis w/o hematoma  serosanguinous drainage, erythema  No odor, erythema, increased warmth, tenderness, induration, fluctuance

## 2022-11-15 NOTE — PROGRESS NOTE ADULT - ASSESSMENT
PEG dysfunction  adjusted again  likely has gastroparesis due to overall medical state  continue feeds  slow feeds  dressing changeds maalox on outside   gauze above the peg bumper    Advanced care planning was discussed with patient and family.  Advanced care planning forms were reviewed and discussed.  Risks, benefits and alternatives of gastroenterologic procedures were discussed in detail and all questions were answered.    30 minutes spent.

## 2022-11-15 NOTE — PROGRESS NOTE ADULT - ASSESSMENT
76 year old female with PMHx of dementia, COPD with chronic respiratory failure s/p trach, cardiac arrest, CHH, quadriplegia, CVA, CKD, anemia, PEG tube, and multiple hospital admissions for respiratory distress presents to the ED BIBEMS from [facility] for shortness of breath, found ot have low grade fever, and leukocytosis to 16, concerning for sepsis

## 2022-11-15 NOTE — PROGRESS NOTE ADULT - PROBLEM SELECTOR PLAN 1
Pt has signs of sepsis possibly secondary to unresolved pneumonia, ventilator associated pneumonia  - recently admitted for VAP, was treated with vanco zosyn, changed to doxy and bactrim, discharged recently with abx via picc line  - care d/w RN yesterday, picc line is flushing fine, unclear why pt's WBC increased, care was d/w ID, and pt was started on meropenem 11/12; WBC downtrended significantly from 15 to 6, leukocytosis resolved, pt afebrile  - cont to f/u ID Dr. Chairez recs  - will f/u cultures  - cxr shows unchanged b/l infiltrates and right pleural effusion, f/u pulm recs, cont vent  - monitor in [facility]  - palliative following, pt remains full code, ongoing discussions with  Pt has signs of sepsis possibly secondary to unresolved pneumonia, ventilator associated pneumonia  - recently admitted for VAP, was treated with vanco zosyn, changed to doxy and bactrim, discharged recently with abx via midline line  - care d/w RN yesterday, midline line is flushing fine, unclear why pt's WBC increased, care was d/w ID, and pt was started on meropenem 11/12; WBC downtrended significantly from 15 to 6, leukocytosis resolved, pt afebrile  - cont to f/u ID Dr. Chairez recs  - will f/u cultures  - cxr shows unchanged b/l infiltrates and right pleural effusion, f/u pulm recs, cont vent  - monitor in [facility]PCU  - palliative following, pt remains full code, ongoing discussions with  Pt has signs of sepsis possibly secondary to unresolved pneumonia, ventilator associated pneumonia  - recently admitted for VAP, was treated with vanco zosyn, changed to doxy and bactrim, discharged recently with abx via midline line  - care d/w RN yesterday, midline line is flushing fine, unclear why pt's WBC increased, care was d/w ID, and pt was started on meropenem 11/12; WBC downtrended significantly from 15 to 6, leukocytosis resolved, pt afebrile; however, RN called later in day pt hypothermic, warming blanket placed, care d/w ID, p[jaren for extended cours eof abx, at least for 2 more days to complete a 5 day course  - cont to f/u ID Dr. Chairez recs  - will f/u cultures  - cxr shows unchanged b/l infiltrates and right pleural effusion, f/u pulm recs, cont vent  - monitor in [facility]PCU  - palliative following, pt remains full code, ongoing discussions with

## 2022-11-16 ENCOUNTER — TRANSCRIPTION ENCOUNTER (OUTPATIENT)
Age: 77
End: 2022-11-16

## 2022-11-16 VITALS
TEMPERATURE: 98 F | OXYGEN SATURATION: 98 % | RESPIRATION RATE: 18 BRPM | DIASTOLIC BLOOD PRESSURE: 57 MMHG | HEART RATE: 71 BPM | SYSTOLIC BLOOD PRESSURE: 110 MMHG

## 2022-11-16 LAB
-  LEVOFLOXACIN: SIGNIFICANT CHANGE UP
-  MINOCYCLINE: SIGNIFICANT CHANGE UP
-  TRIMETHOPRIM/SULFAMETHOXAZOLE: SIGNIFICANT CHANGE UP
ANION GAP SERPL CALC-SCNC: 5 MMOL/L — SIGNIFICANT CHANGE UP (ref 5–17)
BUN SERPL-MCNC: 17 MG/DL — SIGNIFICANT CHANGE UP (ref 7–23)
CALCIUM SERPL-MCNC: 8 MG/DL — LOW (ref 8.4–10.5)
CHLORIDE SERPL-SCNC: 103 MMOL/L — SIGNIFICANT CHANGE UP (ref 96–108)
CO2 SERPL-SCNC: 29 MMOL/L — SIGNIFICANT CHANGE UP (ref 22–31)
CREAT SERPL-MCNC: 1.13 MG/DL — SIGNIFICANT CHANGE UP (ref 0.5–1.3)
CULTURE RESULTS: SIGNIFICANT CHANGE UP
EGFR: 49 ML/MIN/1.73M2 — LOW
GLUCOSE BLDC GLUCOMTR-MCNC: 120 MG/DL — HIGH (ref 70–99)
GLUCOSE BLDC GLUCOMTR-MCNC: 131 MG/DL — HIGH (ref 70–99)
GLUCOSE BLDC GLUCOMTR-MCNC: 138 MG/DL — HIGH (ref 70–99)
GLUCOSE BLDC GLUCOMTR-MCNC: 140 MG/DL — HIGH (ref 70–99)
GLUCOSE SERPL-MCNC: 128 MG/DL — HIGH (ref 70–99)
HCT VFR BLD CALC: 28.7 % — LOW (ref 34.5–45)
HGB BLD-MCNC: 8.8 G/DL — LOW (ref 11.5–15.5)
MCHC RBC-ENTMCNC: 27.5 PG — SIGNIFICANT CHANGE UP (ref 27–34)
MCHC RBC-ENTMCNC: 30.7 GM/DL — LOW (ref 32–36)
MCV RBC AUTO: 89.7 FL — SIGNIFICANT CHANGE UP (ref 80–100)
METHOD TYPE: SIGNIFICANT CHANGE UP
METHOD TYPE: SIGNIFICANT CHANGE UP
NRBC # BLD: 0 /100 WBCS — SIGNIFICANT CHANGE UP (ref 0–0)
ORGANISM # SPEC MICROSCOPIC CNT: SIGNIFICANT CHANGE UP
PLATELET # BLD AUTO: 195 K/UL — SIGNIFICANT CHANGE UP (ref 150–400)
POTASSIUM SERPL-MCNC: 3.7 MMOL/L — SIGNIFICANT CHANGE UP (ref 3.5–5.3)
POTASSIUM SERPL-SCNC: 3.7 MMOL/L — SIGNIFICANT CHANGE UP (ref 3.5–5.3)
RBC # BLD: 3.2 M/UL — LOW (ref 3.8–5.2)
RBC # FLD: 17.2 % — HIGH (ref 10.3–14.5)
SARS-COV-2 RNA SPEC QL NAA+PROBE: SIGNIFICANT CHANGE UP
SODIUM SERPL-SCNC: 137 MMOL/L — SIGNIFICANT CHANGE UP (ref 135–145)
SPECIMEN SOURCE: SIGNIFICANT CHANGE UP
WBC # BLD: 6.52 K/UL — SIGNIFICANT CHANGE UP (ref 3.8–10.5)
WBC # FLD AUTO: 6.52 K/UL — SIGNIFICANT CHANGE UP (ref 3.8–10.5)

## 2022-11-16 PROCEDURE — 87635 SARS-COV-2 COVID-19 AMP PRB: CPT

## 2022-11-16 PROCEDURE — 87086 URINE CULTURE/COLONY COUNT: CPT

## 2022-11-16 PROCEDURE — 87640 STAPH A DNA AMP PROBE: CPT

## 2022-11-16 PROCEDURE — 84100 ASSAY OF PHOSPHORUS: CPT

## 2022-11-16 PROCEDURE — 85025 COMPLETE CBC W/AUTO DIFF WBC: CPT

## 2022-11-16 PROCEDURE — 85730 THROMBOPLASTIN TIME PARTIAL: CPT

## 2022-11-16 PROCEDURE — 0225U NFCT DS DNA&RNA 21 SARSCOV2: CPT

## 2022-11-16 PROCEDURE — 93970 EXTREMITY STUDY: CPT

## 2022-11-16 PROCEDURE — 99239 HOSP IP/OBS DSCHRG MGMT >30: CPT

## 2022-11-16 PROCEDURE — 87077 CULTURE AEROBIC IDENTIFY: CPT

## 2022-11-16 PROCEDURE — 96365 THER/PROPH/DIAG IV INF INIT: CPT

## 2022-11-16 PROCEDURE — 87040 BLOOD CULTURE FOR BACTERIA: CPT

## 2022-11-16 PROCEDURE — 87070 CULTURE OTHR SPECIMN AEROBIC: CPT

## 2022-11-16 PROCEDURE — 94760 N-INVAS EAR/PLS OXIMETRY 1: CPT

## 2022-11-16 PROCEDURE — 83605 ASSAY OF LACTIC ACID: CPT

## 2022-11-16 PROCEDURE — 85027 COMPLETE CBC AUTOMATED: CPT

## 2022-11-16 PROCEDURE — 82962 GLUCOSE BLOOD TEST: CPT

## 2022-11-16 PROCEDURE — 93005 ELECTROCARDIOGRAM TRACING: CPT

## 2022-11-16 PROCEDURE — 85610 PROTHROMBIN TIME: CPT

## 2022-11-16 PROCEDURE — 87184 SC STD DISK METHOD PER PLATE: CPT

## 2022-11-16 PROCEDURE — 94003 VENT MGMT INPAT SUBQ DAY: CPT

## 2022-11-16 PROCEDURE — 84484 ASSAY OF TROPONIN QUANT: CPT

## 2022-11-16 PROCEDURE — 99285 EMERGENCY DEPT VISIT HI MDM: CPT

## 2022-11-16 PROCEDURE — 71045 X-RAY EXAM CHEST 1 VIEW: CPT

## 2022-11-16 PROCEDURE — 87641 MR-STAPH DNA AMP PROBE: CPT

## 2022-11-16 PROCEDURE — 84145 PROCALCITONIN (PCT): CPT

## 2022-11-16 PROCEDURE — 94640 AIRWAY INHALATION TREATMENT: CPT

## 2022-11-16 PROCEDURE — 94002 VENT MGMT INPAT INIT DAY: CPT

## 2022-11-16 PROCEDURE — 80048 BASIC METABOLIC PNL TOTAL CA: CPT

## 2022-11-16 PROCEDURE — 36415 COLL VENOUS BLD VENIPUNCTURE: CPT

## 2022-11-16 PROCEDURE — 96367 TX/PROPH/DG ADDL SEQ IV INF: CPT

## 2022-11-16 PROCEDURE — 83880 ASSAY OF NATRIURETIC PEPTIDE: CPT

## 2022-11-16 PROCEDURE — 87186 SC STD MICRODIL/AGAR DIL: CPT

## 2022-11-16 PROCEDURE — 81001 URINALYSIS AUTO W/SCOPE: CPT

## 2022-11-16 PROCEDURE — 94799 UNLISTED PULMONARY SVC/PX: CPT

## 2022-11-16 PROCEDURE — 80053 COMPREHEN METABOLIC PANEL: CPT

## 2022-11-16 RX ADMIN — PANTOPRAZOLE SODIUM 40 MILLIGRAM(S): 20 TABLET, DELAYED RELEASE ORAL at 11:21

## 2022-11-16 RX ADMIN — SIMETHICONE 80 MILLIGRAM(S): 80 TABLET, CHEWABLE ORAL at 11:22

## 2022-11-16 RX ADMIN — Medication 1 MILLIGRAM(S): at 14:13

## 2022-11-16 RX ADMIN — CHLORHEXIDINE GLUCONATE 1 APPLICATION(S): 213 SOLUTION TOPICAL at 05:09

## 2022-11-16 RX ADMIN — Medication 1 APPLICATION(S): at 14:14

## 2022-11-16 RX ADMIN — Medication 1 TABLET(S): at 05:17

## 2022-11-16 RX ADMIN — Medication 1 GRAM(S): at 17:10

## 2022-11-16 RX ADMIN — Medication 1 GRAM(S): at 11:21

## 2022-11-16 RX ADMIN — CHLORHEXIDINE GLUCONATE 15 MILLILITER(S): 213 SOLUTION TOPICAL at 05:09

## 2022-11-16 RX ADMIN — HEPARIN SODIUM 5000 UNIT(S): 5000 INJECTION INTRAVENOUS; SUBCUTANEOUS at 17:10

## 2022-11-16 RX ADMIN — Medication 1 MILLIGRAM(S): at 17:10

## 2022-11-16 RX ADMIN — ALBUTEROL 2 PUFF(S): 90 AEROSOL, METERED ORAL at 06:36

## 2022-11-16 RX ADMIN — SIMETHICONE 80 MILLIGRAM(S): 80 TABLET, CHEWABLE ORAL at 17:10

## 2022-11-16 RX ADMIN — Medication 30 MILLILITER(S): at 05:15

## 2022-11-16 RX ADMIN — INSULIN GLARGINE 8 UNIT(S): 100 INJECTION, SOLUTION SUBCUTANEOUS at 08:30

## 2022-11-16 RX ADMIN — Medication 325 MILLIGRAM(S): at 11:21

## 2022-11-16 RX ADMIN — MIDODRINE HYDROCHLORIDE 10 MILLIGRAM(S): 2.5 TABLET ORAL at 05:15

## 2022-11-16 RX ADMIN — Medication 1 TABLET(S): at 17:10

## 2022-11-16 RX ADMIN — Medication 1 APPLICATION(S): at 17:10

## 2022-11-16 RX ADMIN — Medication 1 MILLIGRAM(S): at 02:14

## 2022-11-16 RX ADMIN — Medication 1 MILLIGRAM(S): at 09:39

## 2022-11-16 RX ADMIN — NYSTATIN CREAM 1 APPLICATION(S): 100000 CREAM TOPICAL at 14:14

## 2022-11-16 RX ADMIN — Medication 1 MILLIGRAM(S): at 05:15

## 2022-11-16 RX ADMIN — CHLORHEXIDINE GLUCONATE 15 MILLILITER(S): 213 SOLUTION TOPICAL at 17:09

## 2022-11-16 RX ADMIN — MEROPENEM 100 MILLIGRAM(S): 1 INJECTION INTRAVENOUS at 16:07

## 2022-11-16 RX ADMIN — MEROPENEM 100 MILLIGRAM(S): 1 INJECTION INTRAVENOUS at 05:15

## 2022-11-16 RX ADMIN — Medication 1 TABLET(S): at 11:21

## 2022-11-16 RX ADMIN — Medication 1 APPLICATION(S): at 11:48

## 2022-11-16 RX ADMIN — Medication 2 MILLIGRAM(S): at 05:05

## 2022-11-16 RX ADMIN — Medication 1 APPLICATION(S): at 05:33

## 2022-11-16 RX ADMIN — POLYETHYLENE GLYCOL 3350 17 GRAM(S): 17 POWDER, FOR SOLUTION ORAL at 17:10

## 2022-11-16 RX ADMIN — Medication 1 GRAM(S): at 05:15

## 2022-11-16 RX ADMIN — Medication 2 MILLIGRAM(S): at 17:41

## 2022-11-16 RX ADMIN — MIDODRINE HYDROCHLORIDE 10 MILLIGRAM(S): 2.5 TABLET ORAL at 14:13

## 2022-11-16 RX ADMIN — NYSTATIN CREAM 1 APPLICATION(S): 100000 CREAM TOPICAL at 05:17

## 2022-11-16 RX ADMIN — HEPARIN SODIUM 5000 UNIT(S): 5000 INJECTION INTRAVENOUS; SUBCUTANEOUS at 05:09

## 2022-11-16 RX ADMIN — SIMETHICONE 80 MILLIGRAM(S): 80 TABLET, CHEWABLE ORAL at 05:15

## 2022-11-16 RX ADMIN — Medication 30 MILLILITER(S): at 14:13

## 2022-11-16 RX ADMIN — Medication 2 TABLET(S): at 11:22

## 2022-11-16 NOTE — DISCHARGE NOTE PROVIDER - HOSPITAL COURSE
HPI:  76 year old female with PMHx of dementia, COPD with chronic respiratory failure s/p trach, cardiac arrest, CHH, quadriplegia, CVA, CKD, anemia, PEG tube, and multiple hospital admissions for respiratory distress presents to the ED BIBEMS from [facility] for shortness of breath, found ot have low grade fever, midline malfunction, and leukocytosis to 16, fever, concerning for sepsis.  Patient was discharged 2 days ago, but developed this morning increased tachypnea, respiratory distress and it appeared her midline was no longer working, and patient might not be able to receive the antibiotics bactrim and doxycycline which had been prescribed for her to continue in outpt setting after discharge form the hospital.   (12 Nov 2022 08:42)    76 year old female with PMHx of dementia, COPD with chronic respiratory failure s/p trach, cardiac arrest, CHH, quadriplegia, CVA, CKD, anemia, PEG tube, and multiple hospital admissions for respiratory distress presents to the ED BIBEMS from [facility] for shortness of breath, found ot have low grade fever, and leukocytosis to 16, concerning for sepsis     Problem/Plan - 1:  ·  Problem: Sepsis.   ·  Plan: Pt has signs of sepsis possibly secondary to unresolved pneumonia, ventilator associated pneumonia  - recently admitted for VAP, was treated with vanco zosyn, changed to doxy and bactrim, discharged recently with abx via midline line  - midline line is flushing fine, care was d/w ID, and pt was started on meropenem 11/12; WBC downtrended significantly from 15 to 6, leukocytosis resolved, pt afebrile; however, RN called later in day pt hypothermic, warming blanket placed, care d/w ID, plan for extended course of abx, completed a 5 day course  - cont to f/u ID Dr. Chairze recs  - will f/u cultures  - cxr shows unchanged b/l infiltrates and right pleural effusion, f/u pulm recs, cont vent  - monitored  in SPCU  - palliative following, pt remains full code, ongoing discussions with .  BCx NGTD  Tx based of prior RCx of serratia and stenotrophomonas  Completed meropenem/bactrim last day 11/16       Problem/Plan - 2:  ·  Problem: Diabetes.   ·  Plan: cont lantus, insulin coverage scale, FS.     Problem/Plan - 3:  ·  Problem: RYAN (acute kidney injury).   ·  Plan: improved during hospital course, seen by nephrology     Problem/Plan - 4:  ·  Problem: Chronic obstructive pulmonary disease (COPD).   ·  Plan: cont nebulizer treatments.     Problem/Plan - 5:  ·  Problem: Anemia of chronic disease.   ·  Plan: ferrous sulfate.

## 2022-11-16 NOTE — DISCHARGE NOTE PROVIDER - CARE PROVIDER_API CALL
Paul Merrill  INTERNAL MEDICINE  [address] [place] [address], [address]  [place] [address]  Phone: [phone]  Fax: [phone]  Follow Up Time:

## 2022-11-16 NOTE — PROGRESS NOTE ADULT - ASSESSMENT
The patient is a 77 year old female with a history of HTN, DM, CVA, dementia, chronic respiratory failure s/p trach, PEG, cardiac arrest, anemia, pleural effusions who presents with fever and respiratory distress.    Plan:  - Echo 8/22 with normal LV systolic function, mod pulm HTN, IVC small/collapsible  - CXR with diffuse lung infiltrates  - Intermittently sinus bradycardic at times  - Continue midodrine 10 mg tid  - IV antibiotics  - Pulm and ID follow-up  - Comfort care would be most appropriate

## 2022-11-16 NOTE — DISCHARGE NOTE PROVIDER - DID THE PATIENT PRESENT WITH OR WAS TREATED FOR MALNUTRITION DURING THIS ADMISSION
Mr. Arash Simmons' transmission is in MURJ for your review.  He has 30 total episodes of A Fib RVR rates ranging from 150- 240 bpm.  Shock episode begins as A Fib with RVR and progresses to what looks like A Flutter with 1:1 conduction in the VT zone treated with 8 episodes of ATP followed by 1 (41J) shock. Presenting EGM is Sinus- AS/ BIV Paced. Patient is not currently anticoagulated and his AT/AF burden remains at 1%.  Patient has appointment with Dr. Verdugo 3/21/22.     
Pt left a voicemail message with Dr. Naqvi’s nurse that he had received a shock today.     I spoke w/pt who reports he felt “bad” this morning, was sweaty, but started to feel better then went to work. Pt reports while at work, he felt an “episode” coming on, went to the break room & sat down, received a shock. Pt states he went home & slept for 3 hours then returned to work. Pt states he feels tired but otherwise ok. Pt isn’t currently at home to send in a remote manual transmission. He’ll be home after 8PM this evening and will send transmission then.   
No

## 2022-11-16 NOTE — DISCHARGE NOTE PROVIDER - NSDCCPCAREPLAN_GEN_ALL_CORE_FT
PRINCIPAL DISCHARGE DIAGNOSIS  Diagnosis: Pneumonia  Assessment and Plan of Treatment: completed antibiotic course

## 2022-11-16 NOTE — CHART NOTE - NSCHARTNOTEFT_GEN_A_CORE
Assessment: Pt seen for initial nutrition assessment yesterday.  Verbal request made for tube feeding recommendations.  Pt vent dependent unable to provide nutrition related hx. Pt previously admitted from (11/4-11/10).  Pt readmitted from [facility], per transfer documents pt NPO with TF receiving Glucerna 1.5 at goal rate of 60ml/hr over 24hrs with 250 ml free water q6hr before/after feeds + auto flush of 25ml.    Tube feeds previously not initiated PEG dysfunction/leaky PEG site.  GI following, readjusted PEG; noted pt possibly has gastroparesis due to overall medical state.  Recommending starting feeds slowly.  Pt previously tolerating PEG feedings of [facility] from 11/4-11/10 admission.  Pt with increased EEN secondary to multiple pressure ulcers including stage IV to L gluteal, multiple unstageable to toes, stage II to sacrum.  Recommend Glucerna 1.5 via PEG, start low @10ml/hr, assess for peg sit leakage/gi tolerance with slow increase by 10ml every 10hrs, until 50ml/hr x 20hrs is reached (to provide 1500kcal, 82.5g protein, 1000mL TV formula, 760ml free water).  Recommend standard 25ml hourly flush for additional 600ml fluid/d.  Presently on LR@50ml/hr for hydration; if unable to tolerate tube feed initiation and progression, consider changing fluids to D5.  RD to follow closely and will continue to monitor pt's nutrition status/tolerance to EN.    Factors impacting intake: [ ] none [ ] nausea  [ ] vomiting [ ] diarrhea [ ] constipation  [ ]chewing problems [ ] swallowing issues  [ ] other:     Diet Prescription: Diet, NPO:   Except Medications (11-13-22 @ 06:34)    Intake: NPO with meds via peg    Current Weight: Weight (kg): 57 (11-12 @ 06:22)  % Weight Change  125.6# adm wt    Pertinent Medications: MEDICATIONS  (STANDING):  albuterol    90 MICROgram(s) HFA Inhaler 2 Puff(s) Inhalation two times a day  bacitracin   Ointment 1 Application(s) Topical daily  chlorhexidine 0.12% Liquid 15 milliLiter(s) Oral Mucosa every 12 hours  chlorhexidine 2% Cloths 1 Application(s) Topical <User Schedule>  Dakins Solution - Full Strength 1 Application(s) Topical two times a day  dextrose 5%. 1000 milliLiter(s) (50 mL/Hr) IV Continuous <Continuous>  dextrose 5%. 1000 milliLiter(s) (100 mL/Hr) IV Continuous <Continuous>  dextrose 50% Injectable 25 Gram(s) IV Push once  dextrose 50% Injectable 12.5 Gram(s) IV Push once  dextrose 50% Injectable 25 Gram(s) IV Push once  ferrous    sulfate 325 milliGRAM(s) Oral daily  glucagon  Injectable 1 milliGRAM(s) IntraMuscular once  heparin   Injectable 5000 Unit(s) SubCutaneous every 12 hours  insulin glargine Injectable (LANTUS) 8 Unit(s) SubCutaneous every morning  insulin lispro (ADMELOG) corrective regimen sliding scale   SubCutaneous every 6 hours  lactated ringers. 1000 milliLiter(s) (50 mL/Hr) IV Continuous <Continuous>  lactobacillus acidophilus 2 Tablet(s) Oral daily  LORazepam     Tablet 1 milliGRAM(s) Oral every 4 hours  meropenem  IVPB      meropenem  IVPB 1000 milliGRAM(s) IV Intermittent every 12 hours  midodrine 10 milliGRAM(s) Oral every 8 hours  mineral oil/petrolatum Hydrophilic Ointment 1 Application(s) Topical daily  multivitamin 1 Tablet(s) Oral daily  nystatin Powder 1 Application(s) Topical three times a day  pantoprazole  Injectable 40 milliGRAM(s) IV Push daily  polyethylene glycol 3350 17 Gram(s) Oral every 12 hours  povidone iodine 10% Solution 1 Application(s) Topical three times a day  senna 3 Tablet(s) Oral at bedtime  simethicone 80 milliGRAM(s) Chew every 6 hours  sucralfate suspension 1 Gram(s) Oral four times a day  trimethoprim   80 mG/sulfamethoxazole 400 mG 1 Tablet(s) Oral two times a day    MEDICATIONS  (PRN):  acetaminophen     Tablet .. 650 milliGRAM(s) Oral every 6 hours PRN Temp greater or equal to 38C (100.4F), Mild Pain (1 - 3)  dextrose Oral Gel 15 Gram(s) Oral once PRN Blood Glucose LESS THAN 70 milliGRAM(s)/deciliter  LORazepam   Injectable 2 milliGRAM(s) IV Push every 6 hours PRN Agitation    Pertinent Labs: 11-14 Na139 mmol/L Glu 119 mg/dL<H> K+ 4.2 mmol/L Cr  1.31 mg/dL<H> BUN 21 mg/dL 11-13 Phos 2.6 mg/dL 11-14 Alb 1.7 g/dL<L>     CAPILLARY BLOOD GLUCOSE      POCT Blood Glucose.: 99 mg/dL (14 Nov 2022 11:21)  POCT Blood Glucose.: 112 mg/dL (14 Nov 2022 07:49)  POCT Blood Glucose.: 128 mg/dL (14 Nov 2022 04:54)  POCT Blood Glucose.: 110 mg/dL (13 Nov 2022 23:31)  POCT Blood Glucose.: 107 mg/dL (13 Nov 2022 17:42)    Skin:  stage IV to L gluteal, multiple unstageable to toes, stage II to sacrum    Estimated Needs:   [ ] no change since previous assessment  [ ] recalculated:     Previous Nutrition Diagnosis:   [ ] Inadequate Energy Intake [ ]Inadequate Oral Intake [ ] Excessive Energy Intake   [ ] Underweight [ ] Increased Nutrient Needs [ ] Overweight/Obesity   [ ] Altered GI Function [ ] Unintended Weight Loss [ ] Food & Nutrition Related Knowledge Deficit [ ] Malnutrition     Nutrition Diagnosis is [ ] ongoing  [ ] resolved [ ] not applicable     New Nutrition Diagnosis: [ ] not applicable       Interventions: initiate peg feedings slowly   Recommend  [ ] Change Diet To:  [ ] Nutrition Supplement  [ x] Nutrition Support- Glucerna 1.5 via PEG, start low @10ml/hr, assess for peg sit leakage/gi tolerance with slow increase by 10ml every 10hrs, until 50ml/hr x 20hrs is reached, consider 25ml/hr standard tube feeding flush  [ ] Other:     Monitoring and Evaluation:   [ ] PO intake [ x ] Tolerance to EN prescription [ x ] weights [ x ] labs[ x ] follow up per protocol  [ ] other:
Leaking around PEG tube.   Tube feeds, held, made NPO except meds.   Multiple changes of Peg tube prior to this episode, may require a secondary site for PEG tube.   Will wait for GI recs.
Assessment: Pt seen for initial nutrition assessment yesterday.  Verbal request made for tube feeding recommendations.  Pt vent dependent unable to provide nutrition related hx. Pt previously admitted from (11/4-11/10).  Pt readmitted from [facility], per transfer documents pt NPO with TF receiving Glucerna 1.5 at goal rate of 60ml/hr over 24hrs with 250 ml free water q6hr before/after feeds + auto flush of 25ml.    11/16  Pt seen for nutrition follow up.  Tube feeds previously not initiated 2/2 PEG dysfunction/leaky PEG site.  GI following, readjusted PEG; noted pt possibly has gastroparesis due to overall medical state; recommended starting feeds slowly.  Tube feeding order active for Glucerna 1.5 via PEG, presently at goal @50ml/hr x 20hrs (1500kcal, 82.5g protein, 1000mL TV formula, 760ml free water); pt also receiving standard 25ml hourly flush for additional 600ml fluid/d.  IVF d/c'd 11/15.  Per nursing, pt tolerating.  No active leaking from peg site; pt arm positioning also possibly causing peg site excoriation/need for peg readjustment.  Noted likely pending d/c later today.  RD to follow up and will continue to monitor pt's nutrition status/tolerance to EN.    Factors impacting intake: [ ] none [ ] nausea  [ ] vomiting [ ] diarrhea [ ] constipation  [ ]chewing problems [ ] swallowing issues  [ ] other:     Diet Prescription: Diet, NPO with Tube Feed:   Tube Feeding Modality: Gastrostomy  Glucerna 1.5 Horacio  Total Volume for 24 Hours (mL): 1000  Continuous  Starting Tube Feed Rate {mL per Hour}: 10  Increase Tube Feed Rate by (mL): 10     Every 10 hours  Until Goal Tube Feed Rate (mL per Hour): 50  Tube Feed Duration (in Hours): 20  Tube Feed Start Time: 17:00  Free Water Flush  Pump   Rate (mL per Hour): 25   Frequency: Every Hour    Duration (Hours): 24 (11-14-22 @ 15:57)    Intake: peg feedings @goal    Current Weight: Weight (kg): 56.1 (11-12 @ 11:45)  % Weight Change  125.6# adm wt  11/16 131.3#  11/15 130#    Pertinent Medications: MEDICATIONS  (STANDING):  albuterol    90 MICROgram(s) HFA Inhaler 2 Puff(s) Inhalation two times a day  aluminum hydroxide/magnesium hydroxide/simethicone Suspension 30 milliLiter(s) Oral every 8 hours  bacitracin   Ointment 1 Application(s) Topical daily  chlorhexidine 0.12% Liquid 15 milliLiter(s) Oral Mucosa every 12 hours  chlorhexidine 2% Cloths 1 Application(s) Topical <User Schedule>  Dakins Solution - Full Strength 1 Application(s) Topical two times a day  dextrose 5%. 1000 milliLiter(s) (50 mL/Hr) IV Continuous <Continuous>  dextrose 5%. 1000 milliLiter(s) (100 mL/Hr) IV Continuous <Continuous>  dextrose 50% Injectable 25 Gram(s) IV Push once  dextrose 50% Injectable 12.5 Gram(s) IV Push once  dextrose 50% Injectable 25 Gram(s) IV Push once  ferrous    sulfate 325 milliGRAM(s) Oral daily  glucagon  Injectable 1 milliGRAM(s) IntraMuscular once  heparin   Injectable 5000 Unit(s) SubCutaneous every 12 hours  insulin glargine Injectable (LANTUS) 8 Unit(s) SubCutaneous every morning  insulin lispro (ADMELOG) corrective regimen sliding scale   SubCutaneous every 6 hours  lactobacillus acidophilus 2 Tablet(s) Oral daily  LORazepam     Tablet 1 milliGRAM(s) Oral every 4 hours  meropenem  IVPB      meropenem  IVPB 1000 milliGRAM(s) IV Intermittent every 12 hours  midodrine 10 milliGRAM(s) Oral every 8 hours  mineral oil/petrolatum Hydrophilic Ointment 1 Application(s) Topical daily  multivitamin 1 Tablet(s) Oral daily  nystatin Powder 1 Application(s) Topical three times a day  pantoprazole  Injectable 40 milliGRAM(s) IV Push daily  polyethylene glycol 3350 17 Gram(s) Oral every 12 hours  povidone iodine 10% Solution 1 Application(s) Topical three times a day  senna 3 Tablet(s) Oral at bedtime  simethicone 80 milliGRAM(s) Chew every 6 hours  sucralfate suspension 1 Gram(s) Oral four times a day  trimethoprim   80 mG/sulfamethoxazole 400 mG 1 Tablet(s) Oral every 12 hours    MEDICATIONS  (PRN):  acetaminophen     Tablet .. 650 milliGRAM(s) Oral every 6 hours PRN Temp greater or equal to 38C (100.4F), Mild Pain (1 - 3)  dextrose Oral Gel 15 Gram(s) Oral once PRN Blood Glucose LESS THAN 70 milliGRAM(s)/deciliter  LORazepam   Injectable 2 milliGRAM(s) IV Push every 6 hours PRN Agitation    Pertinent Labs: 11-16 Na137 mmol/L Glu 128 mg/dL<H> K+ 3.7 mmol/L Cr  1.13 mg/dL BUN 17 mg/dL 11-13 Phos 2.6 mg/dL 11-14 Alb 1.7 g/dL<L>     CAPILLARY BLOOD GLUCOSE      POCT Blood Glucose.: 138 mg/dL (16 Nov 2022 11:19)  POCT Blood Glucose.: 131 mg/dL (16 Nov 2022 08:29)  POCT Blood Glucose.: 140 mg/dL (16 Nov 2022 05:41)  POCT Blood Glucose.: 104 mg/dL (15 Nov 2022 23:15)  POCT Blood Glucose.: 104 mg/dL (15 Nov 2022 17:48)    Skin:  Pressure Injury 1: Right:,first toe, Unstageable  Pressure Injury 2: Left:,first toe, Unstageable  Pressure Injury 3: Left:,heel, Stage I  Pressure Injury 4: Right:,heel, Stage I  Pressure Injury 5: Right:,malleolus, Stage II,healing- small scab in center with non blanchable redness surrounding, appears to be scarring  Pressure Injury 6: Right:,fifth toe, Unstageable  Pressure Injury 7: sacrum, Stage II,healing stage 2, small open skin in center surrounded by nonblanchable erythema    Estimated Needs:   [ x] no change since previous assessment  [ ] recalculated:     Previous Nutrition Diagnosis:   [ ] Inadequate Energy Intake [ ]Inadequate Oral Intake [ ] Excessive Energy Intake   [ ] Underweight [x ] Increased Nutrient Needs [ ] Overweight/Obesity   [ ] Altered GI Function [ ] Unintended Weight Loss [ ] Food & Nutrition Related Knowledge Deficit [ ] Malnutrition     Nutrition Diagnosis is [x ] ongoing  [ ] resolved [ ] not applicable     New Nutrition Diagnosis: [ ] not applicable       Interventions: peg feedings/flushes, monitor for s/s intolerance   Recommend  [ ] Change Diet To:  [ ] Nutrition Supplement  [ ] Nutrition Support  [ ] Other:     Monitoring and Evaluation:   [ ] PO intake [ x ] Tolerance to EN prescription [ x ] weights [ x ] labs[ x ] follow up per protocol  [ ] other:

## 2022-11-16 NOTE — PROGRESS NOTE ADULT - ASSESSMENT
77YO F resident of [facility] PMHx of dementia, COPD with chronic respiratory failure s/p trach, cardiac arrest, CHH, quadriplegia, CVA, CKD, anemia, PEG tube, sacral decub,  multiple hospital admissions most recent admission for VAP CAUTI   discharged on Bactrim and Doxy po readmitted with shortness of breath, low grade fever Tmax 100, midline malfunction, WBC 16. Pt is in vegetative state.   Pt high risk for recurrent infections   Multiple sources possible  Poss Recurrent VAP  Decub ulcers  Leukocytosis could poss reactive - normalized    Plan :   Cx reviewed  Tracheal Normal Oral Elvira --> now w/ stenotrophomonas; but already being tx as below  BCx NGTD  Tx based of prior RCx of serratia and stenotrophomonas  C/w meropenem/bactrim last day 11/16  Hold off on additional vancomycin  Trend temps and cbc  Vent per Pulm   Full Code per family wishes    Ok to d/c following Abx today    Infectious Diseases will continue to follow. Please call with any questions.   Andressa Brantley M.D.  [facility] Division of Infectious Diseases [phone]

## 2022-11-16 NOTE — DISCHARGE NOTE NURSING/CASE MANAGEMENT/SOCIAL WORK - PATIENT PORTAL LINK FT
You can access the FollowMyHealth Patient Portal offered by [facility] by registering at the following website: [email or web address]. By joining [facility]’s FollowMyHealth portal, you will also be able to view your health information using other applications (apps) compatible with our system.

## 2022-11-16 NOTE — PROGRESS NOTE ADULT - SUBJECTIVE AND OBJECTIVE BOX
Chief Complaint: Respiratory distress    Interval Events: No events overnight.    Review of Systems:  Unable to obtain    Physical Exam:  Vital Signs Last 24 Hrs  T(C): 36.7 (14 Nov 2022 05:50), Max: 37.9 (14 Nov 2022 00:27)  T(F): 98 (14 Nov 2022 05:50), Max: 100.2 (14 Nov 2022 00:27)  HR: 48 (14 Nov 2022 08:00) (48 - 97)  BP: 122/58 (14 Nov 2022 08:00) (88/43 - 165/71)  BP(mean): 77 (14 Nov 2022 08:00) (58 - 98)  RR: 0 (14 Nov 2022 08:00) (0 - 34)  SpO2: 99% (14 Nov 2022 07:32) (93% - 100%)  Parameters below as of 14 Nov 2022 07:31  Patient On (Oxygen Delivery Method): ventilator,40  General: NAD  HEENT: Trach  Neck: No JVD, no carotid bruit  Lungs: CTAB  CV: RRR, nl S1/S2, no M/R/G  Abdomen: S/NT/ND, +BS  Extremities: No LE edema, no cyanosis  Neuro: AAOx0  Skin: No rash    Labs:    11-14    139  |  103  |  21  ----------------------------<  119<H>  4.2   |  30  |  1.31<H>    Ca    7.8<L>      14 Nov 2022 05:30  Phos  2.6     11-13    TPro  6.4  /  Alb  1.7<L>  /  TBili  0.6  /  DBili  x   /  AST  21  /  ALT  <10<L>  /  AlkPhos  93  11-14                        7.7    9.41  )-----------( 155      ( 14 Nov 2022 05:30 )             25.2     ECG/Telemetry: Sinus rhythm
[facility], Division of Infectious Diseases  MO[facility] Lora Y. Patel, S. Shah, [facility]  [phone]    Name: BREA BECKHAM  Age: 76y  Gender: Female  MRN: [id number]    Interval History:  Patient seen and examined  No acute overnight events. Afebrile  Notes reviewed    Antibiotics:  meropenem  IVPB      meropenem  IVPB 1000 milliGRAM(s) IV Intermittent every 12 hours  trimethoprim   80 mG/sulfamethoxazole 400 mG 1 Tablet(s) Oral two times a day      Medications:  acetaminophen     Tablet .. 650 milliGRAM(s) Oral every 6 hours PRN  albuterol    90 MICROgram(s) HFA Inhaler 2 Puff(s) Inhalation two times a day  bacitracin   Ointment 1 Application(s) Topical daily  chlorhexidine 0.12% Liquid 15 milliLiter(s) Oral Mucosa every 12 hours  chlorhexidine 2% Cloths 1 Application(s) Topical <User Schedule>  Dakins Solution - Full Strength 1 Application(s) Topical two times a day  dextrose 5%. 1000 milliLiter(s) IV Continuous <Continuous>  dextrose 5%. 1000 milliLiter(s) IV Continuous <Continuous>  dextrose 50% Injectable 25 Gram(s) IV Push once  dextrose 50% Injectable 12.5 Gram(s) IV Push once  dextrose 50% Injectable 25 Gram(s) IV Push once  dextrose Oral Gel 15 Gram(s) Oral once PRN  ferrous    sulfate 325 milliGRAM(s) Oral daily  glucagon  Injectable 1 milliGRAM(s) IntraMuscular once  heparin   Injectable 5000 Unit(s) SubCutaneous every 12 hours  insulin glargine Injectable (LANTUS) 8 Unit(s) SubCutaneous every morning  insulin lispro (ADMELOG) corrective regimen sliding scale   SubCutaneous every 6 hours  lactated ringers. 1000 milliLiter(s) IV Continuous <Continuous>  lactobacillus acidophilus 2 Tablet(s) Oral daily  LORazepam     Tablet 1 milliGRAM(s) Oral every 4 hours  LORazepam   Injectable 2 milliGRAM(s) IV Push every 6 hours PRN  meropenem  IVPB      meropenem  IVPB 1000 milliGRAM(s) IV Intermittent every 12 hours  midodrine 10 milliGRAM(s) Oral every 8 hours  mineral oil/petrolatum Hydrophilic Ointment 1 Application(s) Topical daily  multivitamin 1 Tablet(s) Oral daily  nystatin Powder 1 Application(s) Topical three times a day  pantoprazole  Injectable 40 milliGRAM(s) IV Push daily  polyethylene glycol 3350 17 Gram(s) Oral every 12 hours  povidone iodine 10% Solution 1 Application(s) Topical three times a day  senna 3 Tablet(s) Oral at bedtime  simethicone 80 milliGRAM(s) Chew every 6 hours  sucralfate suspension 1 Gram(s) Oral four times a day  trimethoprim   80 mG/sulfamethoxazole 400 mG 1 Tablet(s) Oral two times a day      Review of Systems:  unable to obtain    Allergies: codeine (Hives)    For details regarding the patient's past medical history, social history, family history, and other miscellaneous elements, please refer the initial infectious diseases consultation and/or the admitting history and physical examination for this admission.    Objective:  Vital Signs Last 24 Hrs  T(C): 35.4 (15 Nov 2022 13:10), Max: 36.6 (15 Nov 2022 01:00)  T(F): 95.8 (15 Nov 2022 13:10), Max: 97.9 (15 Nov 2022 08:35)  HR: 54 (15 Nov 2022 13:00) (46 - 70)  BP: 117/78 (15 Nov 2022 13:00) (98/56 - 149/79)  BP(mean): 89 (15 Nov 2022 13:00) (70 - 101)  RR: 16 (15 Nov 2022 13:00) (14 - 26)  SpO2: 100% (15 Nov 2022 13:00) (96% - 100%)    Parameters below as of 15 Nov 2022 08:00  Patient On (Oxygen Delivery Method): ventilator    O2 Concentration (%): 40    Physical Examination:  General: no acute distress  HEENT: NC/AT  Neck: trach  Cardio: S1, S2 heard, RRR, no murmurs  Resp: MV  Abd: soft, PGT in place  Ext: no edema or cyanosis  Skin: warm, dry, no visible rash      Laboratory Studies: reviewed    Microbiology: reviewed    Culture - Sputum (collected 11-13-22 @ 01:44)  Source: Trach Asp Tracheal Aspirate  Gram Stain (11-13-22 @ 21:20):    No polymorphonuclear leukocytes per low power field    No Squamous epithelial cells per low power field    No organisms seen per oil power field  Preliminary Report (11-14-22 @ 07:37):    Normal Respiratory Elvira present    Culture - Urine (collected 11-12-22 @ 06:53)  Source: Clean Catch Clean Catch (Midstream)  Final Report (11-14-22 @ 00:07):    <10,000 CFU/mL Normal Urogenital Elvira    Culture - Blood (collected 11-12-22 @ 06:53)  Source: .Blood Blood-Peripheral  Preliminary Report (11-13-22 @ 13:01):    No growth to date.    Culture - Blood (collected 11-12-22 @ 06:53)  Source: .Blood Blood-Peripheral  Preliminary Report (11-13-22 @ 13:01):    No growth to date.    Culture - Abscess with Gram Stain (collected 11-07-22 @ 23:56)  Source: .Abscess foot  Final Report (11-13-22 @ 08:25):    Numerous Acinetobacter baumannii (Carbapenem Resistant) /nosocomialis    group    Few Candida tropicalis "Susceptibilities not performed"    Few Methicillin Resistant Staphylococcus aureus  Organism: Acinetobacter baumannii (Carbapenem Resistant)  Methicillin resistant Staphylococcus aureus (11-13-22 @ 08:25)  Organism: Methicillin resistant Staphylococcus aureus (11-13-22 @ 08:25)      -  Ampicillin/Sulbactam: R 16/8      -  Cefazolin: R >16      -  Clindamycin: S <=0.25      -  Daptomycin: S 1      -  Erythromycin: R >4      -  Gentamicin: S <=1 Should not be used as monotherapy      -  Linezolid: S 2      -  Oxacillin: R >2      -  Penicillin: R >8      -  Rifampin: S <=1 Should not be used as monotherapy      -  Tetracycline: S <=1      -  Trimethoprim/Sulfamethoxazole: S <=0.5/9.5      -  Vancomycin: S 1      Method Type: PRATIK  Organism: Acinetobacter baumannii (Carbapenem Resistant) (11-13-22 @ 08:25)      -  Polymyxin B: R 4      Method Type: ETEST  Organism: Acinetobacter baumannii (Carbapenem Resistant) (11-13-22 @ 08:25)      -  Minocycline: S      -  Piperacillin/Tazobactam: R      Method Type: KB  Organism: Acinetobacter baumannii (Carbapenem Resistant) (11-13-22 @ 08:25)      -  Amikacin: R >32      -  Ampicillin/Sulbactam: S 8/4      -  Cefepime: R >16      -  Ceftazidime: R >16      -  Ciprofloxacin: R >2      -  Gentamicin: R >8      -  Imipenem: R >8      -  Levofloxacin: R >4      -  Meropenem: R >8      -  Tobramycin: S <=2      -  Trimethoprim/Sulfamethoxazole: R >2/38      Method Type: PRATIK    Culture - Sputum (collected 11-05-22 @ 06:39)  Source: Trach Asp Tracheal Aspirate  Gram Stain (11-05-22 @ 14:45):    Few Squamous epithelial cells per low power field    Few polymorphonuclear leukocytes per low power field    Rare Gram Positive Rods per oil power field    Rare Gram Negative Rods per oil power field  Final Report (11-09-22 @ 14:54):    Numerous Stenotrophomonas maltophilia    Moderate Serratia marcescens    Normal Respiratory Elvira present  Organism: Stenotrophomonas maltophilia  Serratia marcescens (11-09-22 @ 14:54)  Organism: Serratia marcescens (11-09-22 @ 14:54)      -  Amikacin: S <=16      -  Amoxicillin/Clavulanic Acid: R >16/8      -  Ampicillin: R >16 These ampicillin results predict results for amoxicillin      -  Ampicillin/Sulbactam: R >16/8 Enterobacter, Klebsiella aerogenes, Citrobacter, and Serratia may develop resistance during prolonged therapy (3-4 days)      -  Aztreonam: S <=4      -  Cefazolin: R >16 Enterobacter, Klebsiella aerogenes, Citrobacter, and Serratia may develop resistance during prolonged therapy (3-4 days)      -  Cefepime: S <=2      -  Cefoxitin: R >16      -  Ceftriaxone: R 8 Enterobacter, Klebsiella aerogenes, Citrobacter, and Serratia may develop resistance during prolonged therapy      -  Ciprofloxacin: R >2      -  Ertapenem: S <=0.5      -  Gentamicin: S <=2      -  Levofloxacin: R 2      -  Meropenem: S <=1      -  Piperacillin/Tazobactam: R 32      -  Tobramycin: S <=2      -  Trimethoprim/Sulfamethoxazole: S <=0.5/9.5      Method Type: PRATIK  Organism: Stenotrophomonas maltophilia (11-09-22 @ 14:54)      -  Minocycline: [id number]      Method Type: KB  Organism: Stenotrophomonas maltophilia (11-09-22 @ 14:54)      -  Levofloxacin: I 4      -  Trimethoprim/Sulfamethoxazole: S <=0.5/9.5      Method Type: PRATIK    Culture - Urine (collected 11-04-22 @ 13:54)  Source: Clean Catch Clean Catch (Midstream)  Final Report (11-07-22 @ 10:15):    >100,000 CFU/ml Enterococcus faecium (vancomycin resistant)    50,000 - 99,000 CFU/mL Candida albicans "Susceptibilities not performed"  Organism: Enterococcus faecium (vancomycin resistant) (11-07-22 @ 10:15)  Organism: Enterococcus faecium (vancomycin resistant) (11-07-22 @ 10:15)      -  Ampicillin: R >8 Predicts results to ampicillin/sulbactam, amoxacillin-clavulanate and  piperacillin-tazobactam.      -  Ciprofloxacin: R >2      -  Daptomycin: SDD 4 The breakpoint for SDD (sensitive dose dependent)is based on a dosage regimen of 8-12 mg/kg administered every 24 h in adults and is intended for serious infections due to E. faecium. Consultation with an infectious diseases specialist is recommended.      -  Levofloxacin: R >4      -  Linezolid: S 2      -  Nitrofurantoin: R >64 Should not be used to treat pyelonephritis.      -  Tetra/Doxy: S <=4      -  Vancomycin: R >16      Method Type: PRATIK    Culture - Blood (collected 11-04-22 @ 13:52)  Source: .Blood Blood-Peripheral  Final Report (11-09-22 @ 20:01):    No Growth Final    Culture - Blood (collected 11-04-22 @ 13:52)  Source: .Blood Blood-Peripheral  Final Report (11-09-22 @ 20:01):    No Growth Final          Radiology: reviewed      
     BREA BECKHAM is a 76yFemale , patient examined and chart reviewed.       INTERVAL HPI/ OVERNIGHT EVENTS:   Afebrile. No events.    PAST MEDICAL & SURGICAL HISTORY:  Dementia of frontal lobe type  Aphasic stroke  Diabetes mellitus  Respiratory failure  Hypertension  GERD (gastroesophageal reflux disease)  Constipation  Respiratory failure  CVA (cerebral vascular accident)  HTN (hypertension)  DM (diabetes mellitus)  Advanced dementia  COVID-19 virus detected  Quadriplegia  Pneumonia  Type II diabetes mellitus  Hx of appendectomy  Gastrostomy in place  Tracheostomy in place          For details regarding the patient's social history, family history, and other miscellaneous elements, please refer the initial infectious diseases consultation and/or the admitting history and physical examination for this admission.    ROS:  Unable to obtain due to : pt's condition      ALLERGIES:  codeine (Hives)      Current inpatient medications :    ANTIBIOTICS/RELEVANT:  lactobacillus acidophilus 2 Tablet(s) Oral daily  meropenem  IVPB      meropenem  IVPB 1000 milliGRAM(s) IV Intermittent every 12 hours  trimethoprim   80 mG/sulfamethoxazole 400 mG 1 Tablet(s) Oral two times a day      acetaminophen     Tablet .. 650 milliGRAM(s) Oral every 6 hours PRN  albuterol    90 MICROgram(s) HFA Inhaler 2 Puff(s) Inhalation two times a day  bacitracin   Ointment 1 Application(s) Topical daily  chlorhexidine 0.12% Liquid 15 milliLiter(s) Oral Mucosa every 12 hours  chlorhexidine 2% Cloths 1 Application(s) Topical <User Schedule>  Dakins Solution - Full Strength 1 Application(s) Topical two times a day  dextrose 5%. 1000 milliLiter(s) IV Continuous <Continuous>  dextrose 5%. 1000 milliLiter(s) IV Continuous <Continuous>  dextrose 50% Injectable 25 Gram(s) IV Push once  dextrose 50% Injectable 12.5 Gram(s) IV Push once  dextrose 50% Injectable 25 Gram(s) IV Push once  dextrose Oral Gel 15 Gram(s) Oral once PRN  ferrous    sulfate 325 milliGRAM(s) Oral daily  glucagon  Injectable 1 milliGRAM(s) IntraMuscular once  heparin   Injectable 5000 Unit(s) SubCutaneous every 12 hours  insulin glargine Injectable (LANTUS) 8 Unit(s) SubCutaneous every morning  insulin lispro (ADMELOG) corrective regimen sliding scale   SubCutaneous every 6 hours  lactated ringers. 1000 milliLiter(s) IV Continuous <Continuous>  LORazepam     Tablet 1 milliGRAM(s) Oral every 4 hours  LORazepam   Injectable 2 milliGRAM(s) IV Push every 6 hours PRN  midodrine 10 milliGRAM(s) Oral every 8 hours  mineral oil/petrolatum Hydrophilic Ointment 1 Application(s) Topical daily  multivitamin 1 Tablet(s) Oral daily  nystatin Powder 1 Application(s) Topical three times a day  pantoprazole  Injectable 40 milliGRAM(s) IV Push daily  polyethylene glycol 3350 17 Gram(s) Oral every 12 hours  povidone iodine 10% Solution 1 Application(s) Topical three times a day  senna 3 Tablet(s) Oral at bedtime  simethicone 80 milliGRAM(s) Chew every 6 hours  sucralfate suspension 1 Gram(s) Oral four times a day      Objective:     @ 07:01  -   @ 07:00  --------------------------------------------------------  IN: 590 mL / OUT: 650 mL / NET: -60 mL     @ 07:01  -   @ 00:01  --------------------------------------------------------  IN: 800 mL / OUT: 202 mL / NET: 598 mL      T(C): 37 (22 @ 19:32), Max: 37.6 (22 @ 02:32)  HR: 65 (22 @ 23:13) (54 - 130)  BP: 111/55 (22 @ 23:13) (88/43 - 178/83)  RR: 26 (22 @ 23:13) ( - 44)  SpO2: 98% (22 @ 23:13) (92% - 100%)    Mode: AC/ CMV (Assist Control/ Continuous Mandatory Ventilation), RR (machine): 18, TV (machine): 400, FiO2: 40, PEEP: 5, ITime: 1, MAP: 15, PIP: 36    Physical Exam:  General: Vegetative state Chronic vent  Neck: supple, trachea midline  Lungs: clear, no wheeze/rhonchi  Cardiovascular: regular rate and rhythm, S1 S2  Abdomen: soft, nontender,  bowel sounds normal + G tube  Neurological: Vegetative state  Skin: no rash  Extremities: +edema            LABS:                          8.0    9.05  )-----------( 175      ( 2022 12:33 )             26.3       11-    139  |  103  |  24<H>  ----------------------------<  124<H>  4.3   |  30  |  1.27    Ca    8.4      2022 06:48  Phos  2.6     11-    TPro  6.6  /  Alb  1.8<L>  /  TBili  0.3  /  DBili  x   /  AST  28  /  ALT  13  /  AlkPhos  106  11-13      PT/INR - ( 2022 06:48 )   PT: 15.9 sec;   INR: 1.38 ratio         PTT - ( 2022 06:53 )  PTT:45.6 sec  Urinalysis Basic - ( 2022 06:53 )    Color: Yellow / Appearance: Clear / S.020 / pH: x  Gluc: x / Ketone: Negative  / Bili: Negative / Urobili: Negative mg/dL   Blood: x / Protein: 100 mg/dL / Nitrite: Negative   Leuk Esterase: Trace / RBC: 0-2 /HPF / WBC 6-10   Sq Epi: x / Non Sq Epi: Few / Bacteria: Few      MICROBIOLOGY:    Culture - Sputum (collected 2022 01:44)  Source: Trach Asp Tracheal Aspirate  Gram Stain (2022 21:20):    No polymorphonuclear leukocytes per low power field    No Squamous epithelial cells per low power field    No organisms seen per oil power field    Culture - Blood (collected 2022 06:53)  Source: .Blood Blood-Peripheral  Preliminary Report (2022 13:01):    No growth to date.    Culture - Blood (collected 2022 06:53)  Source: .Blood Blood-Peripheral  Preliminary Report (2022 13:01):    No growth to date.      RADIOLOGY & ADDITIONAL STUDIES:          Assessment :  77Y[facility] F resident of [facility] PMHx of dementia, COPD with chronic respiratory failure s/p trach, cardiac arrest, CHH, quadriplegia, CVA, CKD, anemia, PEG tube, sacral decub,  multiple hospital admissions most recent admission for VAP CAUTI   discharged on Bactrim and Doxy po readmitted with shortness of breath, low grade fever Tmax 100, midline malfunction, WBC 16. Pt is in vegetative state.   Pt high risk for recurrent infections   Multiple sources possible  Poss Recurrent VAP  Decub ulcers  Leukocytosis could poss reactive - normalised    Plan :   Cont Meropenam and cont Bactrim - Based on most recent sputum cultures  Sp Vanc 1 gram x 1 dose  Fu cultures  Trend temps and cbc  Vent per Pulm   Full Code per family wishes    Continue with present regiment.  Appropriate use of antibiotics and adverse effects reviewed.      > 35 minutes were spent in direct patient care reviewing notes, medications ,labs data/ imaging , discussion with multidisciplinary team.    Thank you for allowing me to participate in care of your patient .    Eusebio Chairez MD  Infectious Disease  [phone]
    BREA BECKHAM     [facility]    Allergies    codeine (Hives)    Intolerances        PAST MEDICAL & SURGICAL HISTORY:  Dementia of frontal lobe type      Aphasic stroke      Diabetes mellitus      Respiratory failure      Hypertension      GERD (gastroesophageal reflux disease)      Constipation      Respiratory failure      CVA (cerebral vascular accident)      HTN (hypertension)      DM (diabetes mellitus)      Advanced dementia      COVID-19 virus detected      Quadriplegia      Pneumonia      Type II diabetes mellitus      Hx of appendectomy      Gastrostomy in place      Tracheostomy in place      Tracheostomy tube present      Feeding by G-tube          FAMILY HISTORY:  No pertinent family history in first degree relatives        Home Medications:  albuterol 90 mcg/inh inhalation aerosol with adapter: 2  inhaled every 6 hours (:37)  Bacid (LAC) oral tablet: 2 tab(s) by gastrostomy tube once a day (:37)  bacitracin 500 units/g topical ointment: Apply topically to affected area once a day to knees (:45)  chlorhexidine 0.12% mucous membrane liquid: 15 milliliter(s) mucous membrane 2 times a day (:37)  Dakins Full Strength 0.5% topical solution: Apply topically to affected area 2 times a day then apply santyl and calcium alginate (:45)  Eucerin topical cream: Apply topically to affected area once a day bilateral feet (:37)  ferrous sulfate 325 mg (65 mg elemental iron) oral tablet: 1 tab(s) by gastrostomy tube once a day (:45)  insulin glargine 100 units/mL subcutaneous solution: 8 unit(s) subcutaneous once a day (in the morning) (:37)  ipratropium-albuterol 0.5 mg-2.5 mg/3 mL inhalation solution: 3 milliliter(s) inhaled every 6 hours (:45)  LORazepam 1 mg oral tablet: 1 tab(s) by gastrostomy tube every 4 hours (:45)  midodrine 10 mg oral tablet: 1 tab(s) by gastrostomy tube every 8 hours (:45)  mulivitamin: by gastrostomy tube once a day (:45)  nystatin 100,000 units/g topical powder: 1 application topically 3 times a day (2022 07:37)  omeprazole 40 mg oral delayed release capsule: 1 cap(s) by gastrostomy tube 2 times a day (2022 09:45)  polyethylene glycol 3350 oral powder for reconstitution: 17 gram(s) by gastrostomy tube every 12 hours (2022 07:37)  senna 8.6 mg oral tablet: 3 tab(s) by gastrostomy tube once a day (at bedtime) (:37)  simethicone 80 mg oral tablet, chewable: 1 tab(s) by gastrostomy tube every 6 hours (:37)  sucralfate 1 g/10 mL oral suspension: 10 milliliter(s) g-tube 4 times a day (before meals and at bedtime) (:37)  Tylenol 325 mg oral tablet: 2 tab(s) orally every 6 hours, As Needed prior to dressing change (:45)      MEDICATIONS  (STANDING):  albuterol    90 MICROgram(s) HFA Inhaler 2 Puff(s) Inhalation two times a day  bacitracin   Ointment 1 Application(s) Topical daily  chlorhexidine 0.12% Liquid 15 milliLiter(s) Oral Mucosa every 12 hours  Dakins Solution - Full Strength 1 Application(s) Topical two times a day  dextrose 5%. 1000 milliLiter(s) (50 mL/Hr) IV Continuous <Continuous>  dextrose 5%. 1000 milliLiter(s) (100 mL/Hr) IV Continuous <Continuous>  dextrose 50% Injectable 25 Gram(s) IV Push once  dextrose 50% Injectable 12.5 Gram(s) IV Push once  dextrose 50% Injectable 25 Gram(s) IV Push once  ferrous    sulfate 325 milliGRAM(s) Oral daily  glucagon  Injectable 1 milliGRAM(s) IntraMuscular once  heparin   Injectable 5000 Unit(s) SubCutaneous every 12 hours  insulin glargine Injectable (LANTUS) 8 Unit(s) SubCutaneous every morning  insulin lispro (ADMELOG) corrective regimen sliding scale   SubCutaneous every 6 hours  lactated ringers. 1000 milliLiter(s) (50 mL/Hr) IV Continuous <Continuous>  lactobacillus acidophilus 2 Tablet(s) Oral daily  LORazepam     Tablet 1 milliGRAM(s) Oral every 4 hours  meropenem  IVPB      meropenem  IVPB 1000 milliGRAM(s) IV Intermittent every 12 hours  midodrine 10 milliGRAM(s) Oral every 8 hours  mineral oil/petrolatum Hydrophilic Ointment 1 Application(s) Topical daily  multivitamin 1 Tablet(s) Oral daily  nystatin Powder 1 Application(s) Topical three times a day  pantoprazole  Injectable 40 milliGRAM(s) IV Push daily  polyethylene glycol 3350 17 Gram(s) Oral every 12 hours  povidone iodine 10% Solution 1 Application(s) Topical three times a day  senna 3 Tablet(s) Oral at bedtime  simethicone 80 milliGRAM(s) Chew every 6 hours  sucralfate suspension 1 Gram(s) Oral four times a day  trimethoprim   80 mG/sulfamethoxazole 400 mG 1 Tablet(s) Oral two times a day    MEDICATIONS  (PRN):  acetaminophen     Tablet .. 650 milliGRAM(s) Oral every 6 hours PRN Temp greater or equal to 38C (100.4F), Mild Pain (1 - 3)  dextrose Oral Gel 15 Gram(s) Oral once PRN Blood Glucose LESS THAN 70 milliGRAM(s)/deciliter  LORazepam   Injectable 2 milliGRAM(s) IV Push every 6 hours PRN Agitation      Diet, NPO:   Except Medications (22 @ 06:34) [Active]          Vital Signs Last 24 Hrs  T(C): 37 (2022 07:38), Max: 37.7 (2022 23:00)  T(F): 98.6 (2022 07:38), Max: 99.8 (2022 23:00)  HR: 70 (2022 06:59) (57 - 130)  BP: 93/48 (2022 06:00) (83/42 - 178/83)  BP(mean): 63 (2022 06:00) (55 - 112)  RR: 18 (2022 06:00) (11 - 44)  SpO2: 98% (2022 06:59) (90% - 100%)    Parameters below as of 2022 06:52  Patient On (Oxygen Delivery Method): ventilator,.50          22 @ 07:01  -  22 @ 07:00  --------------------------------------------------------  IN: 590 mL / OUT: 650 mL / NET: -60 mL        Mode: AC/ CMV (Assist Control/ Continuous Mandatory Ventilation), RR (machine): 18, TV (machine): 400, FiO2: 50, PEEP: 5, ITime: 1, MAP: 13, PIP: 33      LABS:                        7.8    7.16  )-----------( 174      ( 2022 06:48 )             25.5         139  |  103  |  24<H>  ----------------------------<  124<H>  4.3   |  30  |  1.27    Ca    8.4      2022 06:48  Phos  2.6         TPro  6.6  /  Alb  1.8<L>  /  TBili  0.3  /  DBili  x   /  AST  28  /  ALT  13  /  AlkPhos  106      PT/INR - ( 2022 06:48 )   PT: 15.9 sec;   INR: 1.38 ratio         PTT - ( 2022 06:53 )  PTT:45.6 sec  Urinalysis Basic - ( 2022 06:53 )    Color: Yellow / Appearance: Clear / S.020 / pH: x  Gluc: x / Ketone: Negative  / Bili: Negative / Urobili: Negative mg/dL   Blood: x / Protein: 100 mg/dL / Nitrite: Negative   Leuk Esterase: Trace / RBC: 0-2 /HPF / WBC 6-10   Sq Epi: x / Non Sq Epi: Few / Bacteria: Few            WBC:  WBC Count: 7.16 K/uL ( @ 06:48)  WBC Count: 15.44 K/uL ( @ 06:53)  WBC Count: 4.56 K/uL (11-10 @ 06:19)      MICROBIOLOGY:  RECENT CULTURES:   .Abscess foot Acinetobacter baumannii (Carbapenem Resistant)  Methicillin resistant Staphylococcus aureus XXXX   Numerous Acinetobacter baumannii (Carbapenem Resistant) /nosocomialis  group  Few Candida tropicalis "Susceptibilities not performed"  Few Methicillin Resistant Staphylococcus aureus                PT/INR - ( 2022 06:48 )   PT: 15.9 sec;   INR: 1.38 ratio         PTT - ( 2022 06:53 )  PTT:45.6 sec    Sodium:  Sodium, Serum: 139 mmol/L ( @ 06:48)  Sodium, Serum: 136 mmol/L ( @ 06:53)  Sodium, Serum: 139 mmol/L (11-10 @ 06:19)      1.27 mg/dL  06:48  1.18 mg/dL  @ 06:53  1.07 mg/dL 11-10 @ 06:19      Hemoglobin:  Hemoglobin: 7.8 g/dL ( @ 06:48)  Hemoglobin: 10.3 g/dL ( @ 06:53)  Hemoglobin: 8.7 g/dL (11-10 @ 06:19)      Platelets: Platelet Count - Automated: 174 K/uL ( @ 06:48)  Platelet Count - Automated: 264 K/uL ( @ 06:53)  Platelet Count - Automated: 164 K/uL (11-10 @ 06:19)      LIVER FUNCTIONS - ( 2022 06:48 )  Alb: 1.8 g/dL / Pro: 6.6 g/dL / ALK PHOS: 106 U/L / ALT: 13 U/L DA / AST: 28 U/L / GGT: x             Urinalysis Basic - ( 2022 06:53 )    Color: Yellow / Appearance: Clear / S.020 / pH: x  Gluc: x / Ketone: Negative  / Bili: Negative / Urobili: Negative mg/dL   Blood: x / Protein: 100 mg/dL / Nitrite: Negative   Leuk Esterase: Trace / RBC: 0-2 /HPF / WBC 6-10   Sq Epi: x / Non Sq Epi: Few / Bacteria: Few        RADIOLOGY & ADDITIONAL STUDIES:      MICROBIOLOGY:  RECENT CULTURES:   .Abscess foot Acinetobacter baumannii (Carbapenem Resistant)  Methicillin resistant Staphylococcus aureus XXXX   Numerous Acinetobacter baumannii (Carbapenem Resistant) /nosocomialis  group  Few Candida tropicalis "Susceptibilities not performed"  Few Methicillin Resistant Staphylococcus aureus            
Chief Complaint: Respiratory distress    Interval Events: No events overnight.    Review of Systems:  Unable to obtain    Physical Exam:  Vital Signs Last 24 Hrs  T(C): 36.2 (16 Nov 2022 05:40), Max: 36.6 (15 Nov 2022 08:35)  T(F): 97.2 (16 Nov 2022 05:40), Max: 97.9 (15 Nov 2022 08:35)  HR: 72 (16 Nov 2022 07:07) (46 - 121)  BP: 120/63 (16 Nov 2022 06:00) (91/59 - 141/71)  BP(mean): 80 (16 Nov 2022 06:00) (69 - 124)  RR: 12 (16 Nov 2022 05:40) (0 - 33)  SpO2: 99% (16 Nov 2022 07:07) (98% - 100%)  Parameters below as of 16 Nov 2022 07:05  Patient On (Oxygen Delivery Method): ventilator  General: NAD  HEENT: Trach  Neck: No JVD, no carotid bruit  Lungs: CTAB  CV: RRR, nl S1/S2, no M/R/G  Abdomen: S/NT/ND, +BS  Extremities: No LE edema, no cyanosis  Neuro: AAOx0  Skin: No rash    Labs:    11-16    137  |  103  |  17  ----------------------------<  128<H>  3.7   |  29  |  1.13    Ca    8.0<L>      16 Nov 2022 05:45                          8.8    6.52  )-----------( 195      ( 16 Nov 2022 05:45 )             28.7       ECG/Telemetry: Sinus rhythm
    BREA BECKHAM     [facility] 02    Allergies    codeine (Hives)    Intolerances        PAST MEDICAL & SURGICAL HISTORY:  Dementia of frontal lobe type      Aphasic stroke      Diabetes mellitus      Respiratory failure      Hypertension      GERD (gastroesophageal reflux disease)      Constipation      Respiratory failure      CVA (cerebral vascular accident)      HTN (hypertension)      DM (diabetes mellitus)      Advanced dementia      COVID-19 virus detected      Quadriplegia      Pneumonia      Type II diabetes mellitus      Hx of appendectomy      Gastrostomy in place      Tracheostomy in place      Tracheostomy tube present      Feeding by G-tube          FAMILY HISTORY:  No pertinent family history in first degree relatives        Home Medications:  albuterol 90 mcg/inh inhalation aerosol with adapter: 2  inhaled every 6 hours (12 Nov 2022 07:37)  Bacid (LAC) oral tablet: 2 tab(s) by gastrostomy tube once a day (12 Nov 2022 07:37)  bacitracin 500 units/g topical ointment: Apply topically to affected area once a day to knees (12 Nov 2022 09:45)  chlorhexidine 0.12% mucous membrane liquid: 15 milliliter(s) mucous membrane 2 times a day (12 Nov 2022 07:37)  Dakins Full Strength 0.5% topical solution: Apply topically to affected area 2 times a day then apply santyl and calcium alginate (12 Nov 2022 09:45)  Eucerin topical cream: Apply topically to affected area once a day bilateral feet (12 Nov 2022 07:37)  ferrous sulfate 325 mg (65 mg elemental iron) oral tablet: 1 tab(s) by gastrostomy tube once a day (12 Nov 2022 09:45)  insulin glargine 100 units/mL subcutaneous solution: 8 unit(s) subcutaneous once a day (in the morning) (12 Nov 2022 07:37)  ipratropium-albuterol 0.5 mg-2.5 mg/3 mL inhalation solution: 3 milliliter(s) inhaled every 6 hours (12 Nov 2022 09:45)  LORazepam 1 mg oral tablet: 1 tab(s) by gastrostomy tube every 4 hours (12 Nov 2022 09:45)  midodrine 10 mg oral tablet: 1 tab(s) by gastrostomy tube every 8 hours (12 Nov 2022 09:45)  mulivitamin: by gastrostomy tube once a day (12 Nov 2022 09:45)  nystatin 100,000 units/g topical powder: 1 application topically 3 times a day (12 Nov 2022 07:37)  omeprazole 40 mg oral delayed release capsule: 1 cap(s) by gastrostomy tube 2 times a day (12 Nov 2022 09:45)  polyethylene glycol 3350 oral powder for reconstitution: 17 gram(s) by gastrostomy tube every 12 hours (12 Nov 2022 07:37)  senna 8.6 mg oral tablet: 3 tab(s) by gastrostomy tube once a day (at bedtime) (12 Nov 2022 07:37)  simethicone 80 mg oral tablet, chewable: 1 tab(s) by gastrostomy tube every 6 hours (12 Nov 2022 07:37)  sucralfate 1 g/10 mL oral suspension: 10 milliliter(s) g-tube 4 times a day (before meals and at bedtime) (12 Nov 2022 07:37)  Tylenol 325 mg oral tablet: 2 tab(s) orally every 6 hours, As Needed prior to dressing change (12 Nov 2022 09:45)      MEDICATIONS  (STANDING):  albuterol    90 MICROgram(s) HFA Inhaler 2 Puff(s) Inhalation two times a day  aluminum hydroxide/magnesium hydroxide/simethicone Suspension 30 milliLiter(s) Oral every 8 hours  bacitracin   Ointment 1 Application(s) Topical daily  chlorhexidine 0.12% Liquid 15 milliLiter(s) Oral Mucosa every 12 hours  chlorhexidine 2% Cloths 1 Application(s) Topical <User Schedule>  Dakins Solution - Full Strength 1 Application(s) Topical two times a day  dextrose 5%. 1000 milliLiter(s) (100 mL/Hr) IV Continuous <Continuous>  dextrose 5%. 1000 milliLiter(s) (50 mL/Hr) IV Continuous <Continuous>  dextrose 50% Injectable 25 Gram(s) IV Push once  dextrose 50% Injectable 12.5 Gram(s) IV Push once  dextrose 50% Injectable 25 Gram(s) IV Push once  ferrous    sulfate 325 milliGRAM(s) Oral daily  glucagon  Injectable 1 milliGRAM(s) IntraMuscular once  heparin   Injectable 5000 Unit(s) SubCutaneous every 12 hours  insulin glargine Injectable (LANTUS) 8 Unit(s) SubCutaneous every morning  insulin lispro (ADMELOG) corrective regimen sliding scale   SubCutaneous every 6 hours  lactobacillus acidophilus 2 Tablet(s) Oral daily  LORazepam     Tablet 1 milliGRAM(s) Oral every 4 hours  meropenem  IVPB      meropenem  IVPB 1000 milliGRAM(s) IV Intermittent every 12 hours  midodrine 10 milliGRAM(s) Oral every 8 hours  mineral oil/petrolatum Hydrophilic Ointment 1 Application(s) Topical daily  multivitamin 1 Tablet(s) Oral daily  nystatin Powder 1 Application(s) Topical three times a day  pantoprazole  Injectable 40 milliGRAM(s) IV Push daily  polyethylene glycol 3350 17 Gram(s) Oral every 12 hours  povidone iodine 10% Solution 1 Application(s) Topical three times a day  senna 3 Tablet(s) Oral at bedtime  simethicone 80 milliGRAM(s) Chew every 6 hours  sucralfate suspension 1 Gram(s) Oral four times a day  trimethoprim   80 mG/sulfamethoxazole 400 mG 1 Tablet(s) Oral every 12 hours    MEDICATIONS  (PRN):  acetaminophen     Tablet .. 650 milliGRAM(s) Oral every 6 hours PRN Temp greater or equal to 38C (100.4F), Mild Pain (1 - 3)  dextrose Oral Gel 15 Gram(s) Oral once PRN Blood Glucose LESS THAN 70 milliGRAM(s)/deciliter  LORazepam   Injectable 2 milliGRAM(s) IV Push every 6 hours PRN Agitation      Diet, NPO with Tube Feed:   Tube Feeding Modality: Gastrostomy  Glucerna 1.5 Horacio  Total Volume for 24 Hours (mL): 1000  Continuous  Starting Tube Feed Rate mL per Hour: 10  Increase Tube Feed Rate by (mL): 10     Every 10 hours  Until Goal Tube Feed Rate (mL per Hour): 50  Tube Feed Duration (in Hours): 20  Tube Feed Start Time: 17:00  Free Water Flush  Pump   Rate (mL per Hour): 25   Frequency: Every Hour    Duration (Hours): 24 (11-14-22 @ 15:57) [Active]          Vital Signs Last 24 Hrs  T(C): 36.7 (16 Nov 2022 08:37), Max: 36.7 (16 Nov 2022 08:37)  T(F): 98 (16 Nov 2022 08:37), Max: 98 (16 Nov 2022 08:37)  HR: 72 (16 Nov 2022 07:07) (46 - 121)  BP: 120/63 (16 Nov 2022 06:00) (91/59 - 141/71)  BP(mean): 80 (16 Nov 2022 06:00) (69 - 124)  RR: 12 (16 Nov 2022 05:40) (0 - 33)  SpO2: 99% (16 Nov 2022 07:07) (98% - 100%)    Parameters below as of 16 Nov 2022 07:05  Patient On (Oxygen Delivery Method): ventilator          11-15-22 @ 07:01  -  11-16-22 @ 07:00  --------------------------------------------------------  IN: 1440 mL / OUT: 750 mL / NET: 690 mL        Mode: AC/ CMV (Assist Control/ Continuous Mandatory Ventilation), RR (machine): 18, TV (machine): 400, FiO2: 40, PEEP: 5, ITime: 1, MAP: 14, PIP: 35      LABS:                        8.8    6.52  )-----------( 195      ( 16 Nov 2022 05:45 )             28.7     11-16    137  |  103  |  17  ----------------------------<  128<H>  3.7   |  29  |  1.13    Ca    8.0<L>      16 Nov 2022 05:45                WBC:  WBC Count: 6.52 K/uL (11-16 @ 05:45)  WBC Count: 6.32 K/uL (11-15 @ 06:31)  WBC Count: 9.41 K/uL (11-14 @ 05:30)  WBC Count: 9.05 K/uL (11-13 @ 12:33)  WBC Count: 7.16 K/uL (11-13 @ 06:48)      MICROBIOLOGY:  RECENT CULTURES:  11-13 Trach Asp Tracheal Aspirate XXXX   No polymorphonuclear leukocytes per low power field  No Squamous epithelial cells per low power field  No organisms seen per oil power field   Rare Stenotrophomonas maltophilia  Normal Respiratory Elvira present    11-12 .Blood Blood-Peripheral XXXX XXXX   No growth to date.                    Sodium:  Sodium, Serum: 137 mmol/L (11-16 @ 05:45)  Sodium, Serum: 137 mmol/L (11-15 @ 06:31)  Sodium, Serum: 139 mmol/L (11-14 @ 05:30)  Sodium, Serum: 139 mmol/L (11-13 @ 06:48)      1.13 mg/dL 11-16 @ 05:45  1.04 mg/dL 11-15 @ 06:31  1.31 mg/dL 11-14 @ 05:30  1.27 mg/dL 11-13 @ 06:48      Hemoglobin:  Hemoglobin: 8.8 g/dL (11-16 @ 05:45)  Hemoglobin: 8.6 g/dL (11-15 @ 06:31)  Hemoglobin: 7.7 g/dL (11-14 @ 05:30)  Hemoglobin: 8.0 g/dL (11-13 @ 12:33)  Hemoglobin: 7.8 g/dL (11-13 @ 06:48)      Platelets: Platelet Count - Automated: 195 K/uL (11-16 @ 05:45)  Platelet Count - Automated: 163 K/uL (11-15 @ 06:31)  Platelet Count - Automated: 155 K/uL (11-14 @ 05:30)  Platelet Count - Automated: 175 K/uL (11-13 @ 12:33)  Platelet Count - Automated: 174 K/uL (11-13 @ 06:48)              RADIOLOGY & ADDITIONAL STUDIES:      MICROBIOLOGY:  RECENT CULTURES:  11-13 Trach Asp Tracheal Aspirate XXXX   No polymorphonuclear leukocytes per low power field  No Squamous epithelial cells per low power field  No organisms seen per oil power field   Rare Stenotrophomonas maltophilia  Normal Respiratory Elvira present    11-12 .Blood Blood-Peripheral XXXX XXXX   No growth to date.            
    BREA BECKHAM     [facility] 02    Allergies    codeine (Hives)    Intolerances        PAST MEDICAL & SURGICAL HISTORY:  Dementia of frontal lobe type      Aphasic stroke      Diabetes mellitus      Respiratory failure      Hypertension      GERD (gastroesophageal reflux disease)      Constipation      Respiratory failure      CVA (cerebral vascular accident)      HTN (hypertension)      DM (diabetes mellitus)      Advanced dementia      COVID-19 virus detected      Quadriplegia      Pneumonia      Type II diabetes mellitus      Hx of appendectomy      Gastrostomy in place      Tracheostomy in place      Tracheostomy tube present      Feeding by G-tube          FAMILY HISTORY:  No pertinent family history in first degree relatives        Home Medications:  albuterol 90 mcg/inh inhalation aerosol with adapter: 2  inhaled every 6 hours (12 Nov 2022 07:37)  Bacid (LAC) oral tablet: 2 tab(s) by gastrostomy tube once a day (12 Nov 2022 07:37)  bacitracin 500 units/g topical ointment: Apply topically to affected area once a day to knees (12 Nov 2022 09:45)  chlorhexidine 0.12% mucous membrane liquid: 15 milliliter(s) mucous membrane 2 times a day (12 Nov 2022 07:37)  Dakins Full Strength 0.5% topical solution: Apply topically to affected area 2 times a day then apply santyl and calcium alginate (12 Nov 2022 09:45)  Eucerin topical cream: Apply topically to affected area once a day bilateral feet (12 Nov 2022 07:37)  ferrous sulfate 325 mg (65 mg elemental iron) oral tablet: 1 tab(s) by gastrostomy tube once a day (12 Nov 2022 09:45)  insulin glargine 100 units/mL subcutaneous solution: 8 unit(s) subcutaneous once a day (in the morning) (12 Nov 2022 07:37)  ipratropium-albuterol 0.5 mg-2.5 mg/3 mL inhalation solution: 3 milliliter(s) inhaled every 6 hours (12 Nov 2022 09:45)  LORazepam 1 mg oral tablet: 1 tab(s) by gastrostomy tube every 4 hours (12 Nov 2022 09:45)  midodrine 10 mg oral tablet: 1 tab(s) by gastrostomy tube every 8 hours (12 Nov 2022 09:45)  mulivitamin: by gastrostomy tube once a day (12 Nov 2022 09:45)  nystatin 100,000 units/g topical powder: 1 application topically 3 times a day (12 Nov 2022 07:37)  omeprazole 40 mg oral delayed release capsule: 1 cap(s) by gastrostomy tube 2 times a day (12 Nov 2022 09:45)  polyethylene glycol 3350 oral powder for reconstitution: 17 gram(s) by gastrostomy tube every 12 hours (12 Nov 2022 07:37)  senna 8.6 mg oral tablet: 3 tab(s) by gastrostomy tube once a day (at bedtime) (12 Nov 2022 07:37)  simethicone 80 mg oral tablet, chewable: 1 tab(s) by gastrostomy tube every 6 hours (12 Nov 2022 07:37)  sucralfate 1 g/10 mL oral suspension: 10 milliliter(s) g-tube 4 times a day (before meals and at bedtime) (12 Nov 2022 07:37)  Tylenol 325 mg oral tablet: 2 tab(s) orally every 6 hours, As Needed prior to dressing change (12 Nov 2022 09:45)      MEDICATIONS  (STANDING):  albuterol    90 MICROgram(s) HFA Inhaler 2 Puff(s) Inhalation two times a day  aluminum hydroxide/magnesium hydroxide/simethicone Suspension 30 milliLiter(s) Oral every 8 hours  bacitracin   Ointment 1 Application(s) Topical daily  chlorhexidine 0.12% Liquid 15 milliLiter(s) Oral Mucosa every 12 hours  chlorhexidine 2% Cloths 1 Application(s) Topical <User Schedule>  Dakins Solution - Full Strength 1 Application(s) Topical two times a day  dextrose 5%. 1000 milliLiter(s) (50 mL/Hr) IV Continuous <Continuous>  dextrose 5%. 1000 milliLiter(s) (100 mL/Hr) IV Continuous <Continuous>  dextrose 50% Injectable 25 Gram(s) IV Push once  dextrose 50% Injectable 12.5 Gram(s) IV Push once  dextrose 50% Injectable 25 Gram(s) IV Push once  ferrous    sulfate 325 milliGRAM(s) Oral daily  glucagon  Injectable 1 milliGRAM(s) IntraMuscular once  heparin   Injectable 5000 Unit(s) SubCutaneous every 12 hours  insulin glargine Injectable (LANTUS) 8 Unit(s) SubCutaneous every morning  insulin lispro (ADMELOG) corrective regimen sliding scale   SubCutaneous every 6 hours  lactated ringers. 1000 milliLiter(s) (50 mL/Hr) IV Continuous <Continuous>  lactobacillus acidophilus 2 Tablet(s) Oral daily  LORazepam     Tablet 1 milliGRAM(s) Oral every 4 hours  midodrine 10 milliGRAM(s) Oral every 8 hours  mineral oil/petrolatum Hydrophilic Ointment 1 Application(s) Topical daily  multivitamin 1 Tablet(s) Oral daily  nystatin Powder 1 Application(s) Topical three times a day  pantoprazole  Injectable 40 milliGRAM(s) IV Push daily  polyethylene glycol 3350 17 Gram(s) Oral every 12 hours  povidone iodine 10% Solution 1 Application(s) Topical three times a day  senna 3 Tablet(s) Oral at bedtime  simethicone 80 milliGRAM(s) Chew every 6 hours  sucralfate suspension 1 Gram(s) Oral four times a day  vancomycin  IVPB      vancomycin  IVPB 750 milliGRAM(s) IV Intermittent every 24 hours    MEDICATIONS  (PRN):  acetaminophen     Tablet .. 650 milliGRAM(s) Oral every 6 hours PRN Temp greater or equal to 38C (100.4F), Mild Pain (1 - 3)  dextrose Oral Gel 15 Gram(s) Oral once PRN Blood Glucose LESS THAN 70 milliGRAM(s)/deciliter  LORazepam   Injectable 2 milliGRAM(s) IV Push every 6 hours PRN Agitation      Diet, NPO with Tube Feed:   Tube Feeding Modality: Gastrostomy  Glucerna 1.5 Horacio  Total Volume for 24 Hours (mL): 1000  Continuous  Starting Tube Feed Rate mL per Hour: 10  Increase Tube Feed Rate by (mL): 10     Every 10 hours  Until Goal Tube Feed Rate (mL per Hour): 50  Tube Feed Duration (in Hours): 20  Tube Feed Start Time: 17:00  Free Water Flush  Pump   Rate (mL per Hour): 25   Frequency: Every Hour    Duration (Hours): 24 (11-14-22 @ 15:57) [Active]          Vital Signs Last 24 Hrs  T(C): 36.4 (15 Nov 2022 06:00), Max: 36.6 (14 Nov 2022 08:30)  T(F): 97.6 (15 Nov 2022 06:00), Max: 97.9 (14 Nov 2022 08:30)  HR: 50 (15 Nov 2022 08:00) (46 - 67)  BP: 116/65 (15 Nov 2022 08:00) (98/56 - 149/79)  BP(mean): 80 (15 Nov 2022 08:00) (70 - 101)  RR: 16 (15 Nov 2022 08:00) (14 - 26)  SpO2: 100% (15 Nov 2022 08:00) (94% - 100%)    Parameters below as of 15 Nov 2022 08:00  Patient On (Oxygen Delivery Method): ventilator    O2 Concentration (%): 40      11-14-22 @ 07:01  -  11-15-22 @ 07:00  --------------------------------------------------------  IN: 1870 mL / OUT: 551 mL / NET: 1319 mL        Mode: AC/ CMV (Assist Control/ Continuous Mandatory Ventilation), RR (machine): 18, TV (machine): 400, FiO2: 40, PEEP: 5, ITime: 1, MAP: 17, PIP: 40      LABS:                        8.6    6.32  )-----------( 163      ( 15 Nov 2022 06:31 )             28.0     11-15    137  |  103  |  18  ----------------------------<  93  3.8   |  29  |  1.04    Ca    7.9<L>      15 Nov 2022 06:31    TPro  6.4  /  Alb  1.7<L>  /  TBili  0.6  /  DBili  x   /  AST  21  /  ALT  <10<L>  /  AlkPhos  93  11-14              WBC:  WBC Count: 6.32 K/uL (11-15 @ 06:31)  WBC Count: 9.41 K/uL (11-14 @ 05:30)  WBC Count: 9.05 K/uL (11-13 @ 12:33)  WBC Count: 7.16 K/uL (11-13 @ 06:48)  WBC Count: 15.44 K/uL (11-12 @ 06:53)      MICROBIOLOGY:  RECENT CULTURES:  11-13 Trach Asp Tracheal Aspirate XXXX   No polymorphonuclear leukocytes per low power field  No Squamous epithelial cells per low power field  No organisms seen per oil power field   Normal Respiratory Elvira present    11-12 .Blood Blood-Peripheral XXXX XXXX   No growth to date.                    Sodium:  Sodium, Serum: 137 mmol/L (11-15 @ 06:31)  Sodium, Serum: 139 mmol/L (11-14 @ 05:30)  Sodium, Serum: 139 mmol/L (11-13 @ 06:48)  Sodium, Serum: 136 mmol/L (11-12 @ 06:53)      1.04 mg/dL 11-15 @ 06:31  1.31 mg/dL 11-14 @ 05:30  1.27 mg/dL 11-13 @ 06:48  1.18 mg/dL 11-12 @ 06:53      Hemoglobin:  Hemoglobin: 8.6 g/dL (11-15 @ 06:31)  Hemoglobin: 7.7 g/dL (11-14 @ 05:30)  Hemoglobin: 8.0 g/dL (11-13 @ 12:33)  Hemoglobin: 7.8 g/dL (11-13 @ 06:48)  Hemoglobin: 10.3 g/dL (11-12 @ 06:53)      Platelets: Platelet Count - Automated: 163 K/uL (11-15 @ 06:31)  Platelet Count - Automated: 155 K/uL (11-14 @ 05:30)  Platelet Count - Automated: 175 K/uL (11-13 @ 12:33)  Platelet Count - Automated: 174 K/uL (11-13 @ 06:48)  Platelet Count - Automated: 264 K/uL (11-12 @ 06:53)      LIVER FUNCTIONS - ( 14 Nov 2022 05:30 )  Alb: 1.7 g/dL / Pro: 6.4 g/dL / ALK PHOS: 93 U/L / ALT: <10 U/L DA / AST: 21 U/L / GGT: x                 RADIOLOGY & ADDITIONAL STUDIES:      MICROBIOLOGY:  RECENT CULTURES:  11-13 Trach Asp Tracheal Aspirate XXXX   No polymorphonuclear leukocytes per low power field  No Squamous epithelial cells per low power field  No organisms seen per oil power field   Normal Respiratory Elvira present    11-12 .Blood Blood-Peripheral XXXX XXXX   No growth to date.            
    BREA BECKHAM     [facility] 02    Allergies    codeine (Hives)    Intolerances        PAST MEDICAL & SURGICAL HISTORY:  Dementia of frontal lobe type      Aphasic stroke      Diabetes mellitus      Respiratory failure      Hypertension      GERD (gastroesophageal reflux disease)      Constipation      Respiratory failure      CVA (cerebral vascular accident)      HTN (hypertension)      DM (diabetes mellitus)      Advanced dementia      COVID-19 virus detected      Quadriplegia      Pneumonia      Type II diabetes mellitus      Hx of appendectomy      Gastrostomy in place      Tracheostomy in place      Tracheostomy tube present      Feeding by G-tube          FAMILY HISTORY:  No pertinent family history in first degree relatives        Home Medications:  albuterol 90 mcg/inh inhalation aerosol with adapter: 2  inhaled every 6 hours (12 Nov 2022 07:37)  Bacid (LAC) oral tablet: 2 tab(s) by gastrostomy tube once a day (12 Nov 2022 07:37)  bacitracin 500 units/g topical ointment: Apply topically to affected area once a day to knees (12 Nov 2022 09:45)  chlorhexidine 0.12% mucous membrane liquid: 15 milliliter(s) mucous membrane 2 times a day (12 Nov 2022 07:37)  Dakins Full Strength 0.5% topical solution: Apply topically to affected area 2 times a day then apply santyl and calcium alginate (12 Nov 2022 09:45)  Eucerin topical cream: Apply topically to affected area once a day bilateral feet (12 Nov 2022 07:37)  ferrous sulfate 325 mg (65 mg elemental iron) oral tablet: 1 tab(s) by gastrostomy tube once a day (12 Nov 2022 09:45)  insulin glargine 100 units/mL subcutaneous solution: 8 unit(s) subcutaneous once a day (in the morning) (12 Nov 2022 07:37)  ipratropium-albuterol 0.5 mg-2.5 mg/3 mL inhalation solution: 3 milliliter(s) inhaled every 6 hours (12 Nov 2022 09:45)  LORazepam 1 mg oral tablet: 1 tab(s) by gastrostomy tube every 4 hours (12 Nov 2022 09:45)  midodrine 10 mg oral tablet: 1 tab(s) by gastrostomy tube every 8 hours (12 Nov 2022 09:45)  mulivitamin: by gastrostomy tube once a day (12 Nov 2022 09:45)  nystatin 100,000 units/g topical powder: 1 application topically 3 times a day (12 Nov 2022 07:37)  omeprazole 40 mg oral delayed release capsule: 1 cap(s) by gastrostomy tube 2 times a day (12 Nov 2022 09:45)  polyethylene glycol 3350 oral powder for reconstitution: 17 gram(s) by gastrostomy tube every 12 hours (12 Nov 2022 07:37)  senna 8.6 mg oral tablet: 3 tab(s) by gastrostomy tube once a day (at bedtime) (12 Nov 2022 07:37)  simethicone 80 mg oral tablet, chewable: 1 tab(s) by gastrostomy tube every 6 hours (12 Nov 2022 07:37)  sucralfate 1 g/10 mL oral suspension: 10 milliliter(s) g-tube 4 times a day (before meals and at bedtime) (12 Nov 2022 07:37)  Tylenol 325 mg oral tablet: 2 tab(s) orally every 6 hours, As Needed prior to dressing change (12 Nov 2022 09:45)      MEDICATIONS  (STANDING):  albuterol    90 MICROgram(s) HFA Inhaler 2 Puff(s) Inhalation two times a day  bacitracin   Ointment 1 Application(s) Topical daily  chlorhexidine 0.12% Liquid 15 milliLiter(s) Oral Mucosa every 12 hours  chlorhexidine 2% Cloths 1 Application(s) Topical <User Schedule>  Dakins Solution - Full Strength 1 Application(s) Topical two times a day  dextrose 5%. 1000 milliLiter(s) (50 mL/Hr) IV Continuous <Continuous>  dextrose 5%. 1000 milliLiter(s) (100 mL/Hr) IV Continuous <Continuous>  dextrose 50% Injectable 25 Gram(s) IV Push once  dextrose 50% Injectable 12.5 Gram(s) IV Push once  dextrose 50% Injectable 25 Gram(s) IV Push once  ferrous    sulfate 325 milliGRAM(s) Oral daily  glucagon  Injectable 1 milliGRAM(s) IntraMuscular once  heparin   Injectable 5000 Unit(s) SubCutaneous every 12 hours  insulin glargine Injectable (LANTUS) 8 Unit(s) SubCutaneous every morning  insulin lispro (ADMELOG) corrective regimen sliding scale   SubCutaneous every 6 hours  lactated ringers. 1000 milliLiter(s) (50 mL/Hr) IV Continuous <Continuous>  lactobacillus acidophilus 2 Tablet(s) Oral daily  LORazepam     Tablet 1 milliGRAM(s) Oral every 4 hours  meropenem  IVPB      meropenem  IVPB 1000 milliGRAM(s) IV Intermittent every 12 hours  midodrine 10 milliGRAM(s) Oral every 8 hours  mineral oil/petrolatum Hydrophilic Ointment 1 Application(s) Topical daily  multivitamin 1 Tablet(s) Oral daily  nystatin Powder 1 Application(s) Topical three times a day  pantoprazole  Injectable 40 milliGRAM(s) IV Push daily  polyethylene glycol 3350 17 Gram(s) Oral every 12 hours  povidone iodine 10% Solution 1 Application(s) Topical three times a day  senna 3 Tablet(s) Oral at bedtime  simethicone 80 milliGRAM(s) Chew every 6 hours  sucralfate suspension 1 Gram(s) Oral four times a day  trimethoprim   80 mG/sulfamethoxazole 400 mG 1 Tablet(s) Oral two times a day    MEDICATIONS  (PRN):  acetaminophen     Tablet .. 650 milliGRAM(s) Oral every 6 hours PRN Temp greater or equal to 38C (100.4F), Mild Pain (1 - 3)  dextrose Oral Gel 15 Gram(s) Oral once PRN Blood Glucose LESS THAN 70 milliGRAM(s)/deciliter  LORazepam   Injectable 2 milliGRAM(s) IV Push every 6 hours PRN Agitation      Diet, NPO:   Except Medications (11-13-22 @ 06:34) [Active]          Vital Signs Last 24 Hrs  T(C): 36.7 (14 Nov 2022 05:50), Max: 37.9 (14 Nov 2022 00:27)  T(F): 98 (14 Nov 2022 05:50), Max: 100.2 (14 Nov 2022 00:27)  HR: 48 (14 Nov 2022 07:32) (48 - 97)  BP: 127/57 (14 Nov 2022 07:00) (88/43 - 165/71)  BP(mean): 78 (14 Nov 2022 07:00) (58 - 98)  RR: 18 (14 Nov 2022 07:00) (12 - 34)  SpO2: 99% (14 Nov 2022 07:32) (93% - 100%)    Parameters below as of 14 Nov 2022 07:31  Patient On (Oxygen Delivery Method): ventilator,40          11-13-22 @ 07:01  -  11-14-22 @ 07:00  --------------------------------------------------------  IN: 1250 mL / OUT: 653 mL / NET: 597 mL        Mode: AC/ CMV (Assist Control/ Continuous Mandatory Ventilation), RR (machine): 18, TV (machine): 400, FiO2: 40, PEEP: 5, ITime: 1, MAP: 13, PIP: 30      LABS:                        7.7    9.41  )-----------( 155      ( 14 Nov 2022 05:30 )             25.2     11-14    139  |  103  |  21  ----------------------------<  119<H>  4.2   |  30  |  1.31<H>    Ca    7.8<L>      14 Nov 2022 05:30  Phos  2.6     11-13    TPro  6.4  /  Alb  1.7<L>  /  TBili  0.6  /  DBili  x   /  AST  21  /  ALT  <10<L>  /  AlkPhos  93  11-14    PT/INR - ( 13 Nov 2022 06:48 )   PT: 15.9 sec;   INR: 1.38 ratio                   WBC:  WBC Count: 9.41 K/uL (11-14 @ 05:30)  WBC Count: 9.05 K/uL (11-13 @ 12:33)  WBC Count: 7.16 K/uL (11-13 @ 06:48)  WBC Count: 15.44 K/uL (11-12 @ 06:53)      MICROBIOLOGY:  RECENT CULTURES:  11-13 Trach Asp Tracheal Aspirate XXXX   No polymorphonuclear leukocytes per low power field  No Squamous epithelial cells per low power field  No organisms seen per oil power field   Normal Respiratory Elvira present    11-12 .Blood Blood-Peripheral XXXX XXXX   No growth to date.    11-07 .Abscess foot Acinetobacter baumannii (Carbapenem Resistant)  Methicillin resistant Staphylococcus aureus XXXX   Numerous Acinetobacter baumannii (Carbapenem Resistant) /nosocomialis  group  Few Candida tropicalis "Susceptibilities not performed"  Few Methicillin Resistant Staphylococcus aureus                PT/INR - ( 13 Nov 2022 06:48 )   PT: 15.9 sec;   INR: 1.38 ratio             Sodium:  Sodium, Serum: 139 mmol/L (11-14 @ 05:30)  Sodium, Serum: 139 mmol/L (11-13 @ 06:48)  Sodium, Serum: 136 mmol/L (11-12 @ 06:53)      1.31 mg/dL 11-14 @ 05:30  1.27 mg/dL 11-13 @ 06:48  1.18 mg/dL 11-12 @ 06:53      Hemoglobin:  Hemoglobin: 7.7 g/dL (11-14 @ 05:30)  Hemoglobin: 8.0 g/dL (11-13 @ 12:33)  Hemoglobin: 7.8 g/dL (11-13 @ 06:48)  Hemoglobin: 10.3 g/dL (11-12 @ 06:53)      Platelets: Platelet Count - Automated: 155 K/uL (11-14 @ 05:30)  Platelet Count - Automated: 175 K/uL (11-13 @ 12:33)  Platelet Count - Automated: 174 K/uL (11-13 @ 06:48)  Platelet Count - Automated: 264 K/uL (11-12 @ 06:53)      LIVER FUNCTIONS - ( 14 Nov 2022 05:30 )  Alb: 1.7 g/dL / Pro: 6.4 g/dL / ALK PHOS: 93 U/L / ALT: <10 U/L DA / AST: 21 U/L / GGT: x                 RADIOLOGY & ADDITIONAL STUDIES:      MICROBIOLOGY:  RECENT CULTURES:  11-13 Trach Asp Tracheal Aspirate XXXX   No polymorphonuclear leukocytes per low power field  No Squamous epithelial cells per low power field  No organisms seen per oil power field   Normal Respiratory Elvira present    11-12 .Blood Blood-Peripheral XXXX XXXX   No growth to date.    11-07 .Abscess foot Acinetobacter baumannii (Carbapenem Resistant)  Methicillin resistant Staphylococcus aureus XXXX   Numerous Acinetobacter baumannii (Carbapenem Resistant) /nosocomialis  group  Few Candida tropicalis "Susceptibilities not performed"  Few Methicillin Resistant Staphylococcus aureus            
Chief Complaint: Respiratory distress    Interval Events: No events overnight.    Review of Systems:  Unable to obtain    Physical Exam:  Vital Signs Last 24 Hrs  T(C): 36.4 (15 Nov 2022 06:00), Max: 36.6 (14 Nov 2022 08:30)  T(F): 97.6 (15 Nov 2022 06:00), Max: 97.9 (14 Nov 2022 08:30)  HR: 50 (15 Nov 2022 07:50) (46 - 67)  BP: 124/60 (15 Nov 2022 07:00) (98/56 - 149/79)  BP(mean): 79 (15 Nov 2022 07:00) (70 - 101)  RR: 18 (15 Nov 2022 07:00) (14 - 26)  SpO2: 100% (15 Nov 2022 07:50) (94% - 100%)  Parameters below as of 15 Nov 2022 07:50  Patient On (Oxygen Delivery Method): ventilator,40  General: NAD  HEENT: Trach  Neck: No JVD, no carotid bruit  Lungs: CTAB  CV: RRR, nl S1/S2, no M/R/G  Abdomen: S/NT/ND, +BS  Extremities: No LE edema, no cyanosis  Neuro: AAOx0  Skin: No rash    Labs:    11-15    137  |  103  |  18  ----------------------------<  93  3.8   |  29  |  1.04    Ca    7.9<L>      15 Nov 2022 06:31    TPro  6.4  /  Alb  1.7<L>  /  TBili  0.6  /  DBili  x   /  AST  21  /  ALT  <10<L>  /  AlkPhos  93  11-14                        8.6    6.32  )-----------( 163      ( 15 Nov 2022 06:31 )             28.0       ECG/Telemetry: Sinus rhythm
[facility], Division of Infectious Diseases  MOIZ Lora Y. Patel, S. Shah, G. [facility]  [phone]    Name: BREA BECKHAM  Age: 76y  Gender: Female  MRN: [id number]    Interval History:  Patient seen and examined  No acute overnight events.   Notes reviewed    Antibiotics:  meropenem  IVPB      meropenem  IVPB 1000 milliGRAM(s) IV Intermittent every 12 hours  trimethoprim   80 mG/sulfamethoxazole 400 mG 1 Tablet(s) Oral two times a day      Medications:  acetaminophen     Tablet .. 650 milliGRAM(s) Oral every 6 hours PRN  albuterol    90 MICROgram(s) HFA Inhaler 2 Puff(s) Inhalation two times a day  bacitracin   Ointment 1 Application(s) Topical daily  chlorhexidine 0.12% Liquid 15 milliLiter(s) Oral Mucosa every 12 hours  chlorhexidine 2% Cloths 1 Application(s) Topical <User Schedule>  Dakins Solution - Full Strength 1 Application(s) Topical two times a day  dextrose 5%. 1000 milliLiter(s) IV Continuous <Continuous>  dextrose 5%. 1000 milliLiter(s) IV Continuous <Continuous>  dextrose 50% Injectable 25 Gram(s) IV Push once  dextrose 50% Injectable 12.5 Gram(s) IV Push once  dextrose 50% Injectable 25 Gram(s) IV Push once  dextrose Oral Gel 15 Gram(s) Oral once PRN  ferrous    sulfate 325 milliGRAM(s) Oral daily  glucagon  Injectable 1 milliGRAM(s) IntraMuscular once  heparin   Injectable 5000 Unit(s) SubCutaneous every 12 hours  insulin glargine Injectable (LANTUS) 8 Unit(s) SubCutaneous every morning  insulin lispro (ADMELOG) corrective regimen sliding scale   SubCutaneous every 6 hours  lactated ringers. 1000 milliLiter(s) IV Continuous <Continuous>  lactobacillus acidophilus 2 Tablet(s) Oral daily  LORazepam     Tablet 1 milliGRAM(s) Oral every 4 hours  LORazepam   Injectable 2 milliGRAM(s) IV Push every 6 hours PRN  meropenem  IVPB      meropenem  IVPB 1000 milliGRAM(s) IV Intermittent every 12 hours  midodrine 10 milliGRAM(s) Oral every 8 hours  mineral oil/petrolatum Hydrophilic Ointment 1 Application(s) Topical daily  multivitamin 1 Tablet(s) Oral daily  nystatin Powder 1 Application(s) Topical three times a day  pantoprazole  Injectable 40 milliGRAM(s) IV Push daily  polyethylene glycol 3350 17 Gram(s) Oral every 12 hours  povidone iodine 10% Solution 1 Application(s) Topical three times a day  senna 3 Tablet(s) Oral at bedtime  simethicone 80 milliGRAM(s) Chew every 6 hours  sucralfate suspension 1 Gram(s) Oral four times a day  trimethoprim   80 mG/sulfamethoxazole 400 mG 1 Tablet(s) Oral two times a day      Review of Systems:  unable to obtain    Allergies: codeine (Hives)    For details regarding the patient's past medical history, social history, family history, and other miscellaneous elements, please refer the initial infectious diseases consultation and/or the admitting history and physical examination for this admission.    Objective:  Vitals:   T(C): 36.1 (11-14-22 @ 15:39), Max: 37.9 (11-14-22 @ 00:27)  HR: 55 (11-14-22 @ 15:00) (48 - 97)  BP: 123/71 (11-14-22 @ 15:00) (95/53 - 136/63)  RR: 18 (11-14-22 @ 15:00) (12 - 26)  SpO2: 100% (11-14-22 @ 14:16) (93% - 100%)    Physical Examination:  General: no acute distress  HEENT: NC/AT  Neck: trach  Cardio: S1, S2 heard, RRR, no murmurs  Resp: MV  Abd: soft, PGT in place  Ext: no edema or cyanosis  Skin: warm, dry, no visible rash      Laboratory Studies:  CBC:                       7.7    9.41  )-----------( 155      ( 14 Nov 2022 05:30 )             25.2     CMP: 11-14    139  |  103  |  21  ----------------------------<  119<H>  4.2   |  30  |  1.31<H>    Ca    7.8<L>      14 Nov 2022 05:30  Phos  2.6     11-13    TPro  6.4  /  Alb  1.7<L>  /  TBili  0.6  /  DBili  x   /  AST  21  /  ALT  <10<L>  /  AlkPhos  93  11-14    LIVER FUNCTIONS - ( 14 Nov 2022 05:30 )  Alb: 1.7 g/dL / Pro: 6.4 g/dL / ALK PHOS: 93 U/L / ALT: <10 U/L DA / AST: 21 U/L / GGT: x               Microbiology: reviewed    Culture - Sputum (collected 11-13-22 @ 01:44)  Source: Trach Asp Tracheal Aspirate  Gram Stain (11-13-22 @ 21:20):    No polymorphonuclear leukocytes per low power field    No Squamous epithelial cells per low power field    No organisms seen per oil power field  Preliminary Report (11-14-22 @ 07:37):    Normal Respiratory Elvira present    Culture - Urine (collected 11-12-22 @ 06:53)  Source: Clean Catch Clean Catch (Midstream)  Final Report (11-14-22 @ 00:07):    <10,000 CFU/mL Normal Urogenital Elvira    Culture - Blood (collected 11-12-22 @ 06:53)  Source: .Blood Blood-Peripheral  Preliminary Report (11-13-22 @ 13:01):    No growth to date.    Culture - Blood (collected 11-12-22 @ 06:53)  Source: .Blood Blood-Peripheral  Preliminary Report (11-13-22 @ 13:01):    No growth to date.    Culture - Abscess with Gram Stain (collected 11-07-22 @ 23:56)  Source: .Abscess foot  Final Report (11-13-22 @ 08:25):    Numerous Acinetobacter baumannii (Carbapenem Resistant) /nosocomialis    group    Few Candida tropicalis "Susceptibilities not performed"    Few Methicillin Resistant Staphylococcus aureus  Organism: Acinetobacter baumannii (Carbapenem Resistant)  Methicillin resistant Staphylococcus aureus (11-13-22 @ 08:25)  Organism: Methicillin resistant Staphylococcus aureus (11-13-22 @ 08:25)      -  Ampicillin/Sulbactam: R 16/8      -  Cefazolin: R >16      -  Clindamycin: S <=0.25      -  Daptomycin: S 1      -  Erythromycin: R >4      -  Gentamicin: S <=1 Should not be used as monotherapy      -  Linezolid: S 2      -  Oxacillin: R >2      -  Penicillin: R >8      -  Rifampin: S <=1 Should not be used as monotherapy      -  Tetracycline: S <=1      -  Trimethoprim/Sulfamethoxazole: S <=0.5/9.5      -  Vancomycin: S 1      Method Type: PRATIK  Organism: Acinetobacter baumannii (Carbapenem Resistant) (11-13-22 @ 08:25)      -  Polymyxin B: R 4      Method Type: ETEST  Organism: Acinetobacter baumannii (Carbapenem Resistant) (11-13-22 @ 08:25)      -  Minocycline: S      -  Piperacillin/Tazobactam: R      Method Type: KB  Organism: Acinetobacter baumannii (Carbapenem Resistant) (11-13-22 @ 08:25)      -  Amikacin: R >32      -  Ampicillin/Sulbactam: S 8/4      -  Cefepime: R >16      -  Ceftazidime: R >16      -  Ciprofloxacin: R >2      -  Gentamicin: R >8      -  Imipenem: R >8      -  Levofloxacin: R >4      -  Meropenem: R >8      -  Tobramycin: S <=2      -  Trimethoprim/Sulfamethoxazole: R >2/38      Method Type: PRATIK    Culture - Sputum (collected 11-05-22 @ 06:39)  Source: Trach Asp Tracheal Aspirate  Gram Stain (11-05-22 @ 14:45):    Few Squamous epithelial cells per low power field    Few polymorphonuclear leukocytes per low power field    Rare Gram Positive Rods per oil power field    Rare Gram Negative Rods per oil power field  Final Report (11-09-22 @ 14:54):    Numerous Stenotrophomonas maltophilia    Moderate Serratia marcescens    Normal Respiratory Elvira present  Organism: Stenotrophomonas maltophilia  Serratia marcescens (11-09-22 @ 14:54)  Organism: Serratia marcescens (11-09-22 @ 14:54)      -  Amikacin: S <=16      -  Amoxicillin/Clavulanic Acid: R >16/8      -  Ampicillin: R >16 These ampicillin results predict results for amoxicillin      -  Ampicillin/Sulbactam: R >16/8 Enterobacter, Klebsiella aerogenes, Citrobacter, and Serratia may develop resistance during prolonged therapy (3-4 days)      -  Aztreonam: S <=4      -  Cefazolin: R >16 Enterobacter, Klebsiella aerogenes, Citrobacter, and Serratia may develop resistance during prolonged therapy (3-4 days)      -  Cefepime: S <=2      -  Cefoxitin: R >16      -  Ceftriaxone: R 8 Enterobacter, Klebsiella aerogenes, Citrobacter, and Serratia may develop resistance during prolonged therapy      -  Ciprofloxacin: R >2      -  Ertapenem: S <=0.5      -  Gentamicin: S <=2      -  Levofloxacin: R 2      -  Meropenem: S <=1      -  Piperacillin/Tazobactam: R 32      -  Tobramycin: S <=2      -  Trimethoprim/Sulfamethoxazole: S <=0.5/9.5      Method Type: PRATIK  Organism: Stenotrophomonas maltophilia (11-09-22 @ 14:54)      -  Minocycline: [id number]      Method Type: KB  Organism: Stenotrophomonas maltophilia (11-09-22 @ 14:54)      -  Levofloxacin: I 4      -  Trimethoprim/Sulfamethoxazole: S <=0.5/9.5      Method Type: PRATIK    Culture - Urine (collected 11-04-22 @ 13:54)  Source: Clean Catch Clean Catch (Midstream)  Final Report (11-07-22 @ 10:15):    >100,000 CFU/ml Enterococcus faecium (vancomycin resistant)    50,000 - 99,000 CFU/mL Candida albicans "Susceptibilities not performed"  Organism: Enterococcus faecium (vancomycin resistant) (11-07-22 @ 10:15)  Organism: Enterococcus faecium (vancomycin resistant) (11-07-22 @ 10:15)      -  Ampicillin: R >8 Predicts results to ampicillin/sulbactam, amoxacillin-clavulanate and  piperacillin-tazobactam.      -  Ciprofloxacin: R >2      -  Daptomycin: SDD 4 The breakpoint for SDD (sensitive dose dependent)is based on a dosage regimen of 8-12 mg/kg administered every 24 h in adults and is intended for serious infections due to E. faecium. Consultation with an infectious diseases specialist is recommended.      -  Levofloxacin: R >4      -  Linezolid: S 2      -  Nitrofurantoin: R >64 Should not be used to treat pyelonephritis.      -  Tetra/Doxy: S <=4      -  Vancomycin: R >16      Method Type: PRATIK    Culture - Blood (collected 11-04-22 @ 13:52)  Source: .Blood Blood-Peripheral  Final Report (11-09-22 @ 20:01):    No Growth Final    Culture - Blood (collected 11-04-22 @ 13:52)  Source: .Blood Blood-Peripheral  Final Report (11-09-22 @ 20:01):    No Growth Final          Radiology: reviewed      
[facility], Division of Infectious Diseases  MOIZ Lora Y. Patel, S. Shah, G. [facility]  [phone]    Name: BREA BECKHAM  Age: 77y  Gender: Female  MRN: [id number]    Interval History:  Patient seen and examined at bedside  No acute overnight events. Afebrile  Notes reviewed    Antibiotics:  meropenem  IVPB      meropenem  IVPB 1000 milliGRAM(s) IV Intermittent every 12 hours  trimethoprim   80 mG/sulfamethoxazole 400 mG 1 Tablet(s) Oral every 12 hours      Medications:  acetaminophen     Tablet .. 650 milliGRAM(s) Oral every 6 hours PRN  albuterol    90 MICROgram(s) HFA Inhaler 2 Puff(s) Inhalation two times a day  aluminum hydroxide/magnesium hydroxide/simethicone Suspension 30 milliLiter(s) Oral every 8 hours  bacitracin   Ointment 1 Application(s) Topical daily  chlorhexidine 0.12% Liquid 15 milliLiter(s) Oral Mucosa every 12 hours  chlorhexidine 2% Cloths 1 Application(s) Topical <User Schedule>  Dakins Solution - Full Strength 1 Application(s) Topical two times a day  dextrose 5%. 1000 milliLiter(s) IV Continuous <Continuous>  dextrose 5%. 1000 milliLiter(s) IV Continuous <Continuous>  dextrose 50% Injectable 25 Gram(s) IV Push once  dextrose 50% Injectable 12.5 Gram(s) IV Push once  dextrose 50% Injectable 25 Gram(s) IV Push once  dextrose Oral Gel 15 Gram(s) Oral once PRN  ferrous    sulfate 325 milliGRAM(s) Oral daily  glucagon  Injectable 1 milliGRAM(s) IntraMuscular once  heparin   Injectable 5000 Unit(s) SubCutaneous every 12 hours  insulin glargine Injectable (LANTUS) 8 Unit(s) SubCutaneous every morning  insulin lispro (ADMELOG) corrective regimen sliding scale   SubCutaneous every 6 hours  lactobacillus acidophilus 2 Tablet(s) Oral daily  LORazepam     Tablet 1 milliGRAM(s) Oral every 4 hours  LORazepam   Injectable 2 milliGRAM(s) IV Push every 6 hours PRN  meropenem  IVPB      meropenem  IVPB 1000 milliGRAM(s) IV Intermittent every 12 hours  midodrine 10 milliGRAM(s) Oral every 8 hours  mineral oil/petrolatum Hydrophilic Ointment 1 Application(s) Topical daily  multivitamin 1 Tablet(s) Oral daily  nystatin Powder 1 Application(s) Topical three times a day  pantoprazole  Injectable 40 milliGRAM(s) IV Push daily  polyethylene glycol 3350 17 Gram(s) Oral every 12 hours  povidone iodine 10% Solution 1 Application(s) Topical three times a day  senna 3 Tablet(s) Oral at bedtime  simethicone 80 milliGRAM(s) Chew every 6 hours  sucralfate suspension 1 Gram(s) Oral four times a day  trimethoprim   80 mG/sulfamethoxazole 400 mG 1 Tablet(s) Oral every 12 hours      Review of Systems: unable to obtain    Allergies: codeine (Hives)    For details regarding the patient's past medical history, social history, family history, and other miscellaneous elements, please refer the initial infectious diseases consultation and/or the admitting history and physical examination for this admission.    Objective:  Vitals:   T(C): 36.7 (11-16-22 @ 12:36), Max: 36.7 (11-16-22 @ 08:37)  HR: 75 (11-16-22 @ 11:00) (46 - 121)  BP: 109/68 (11-16-22 @ 11:00) (91/59 - 136/116)  RR: 24 (11-16-22 @ 11:00) (0 - 33)  SpO2: 98% (11-16-22 @ 11:00) (96% - 100%)    Physical Examination:  General: no acute distress  HEENT: NC/AT  Neck: trach  Cardio: S1, S2 heard, RRR, no murmurs  Resp: MV  Abd: soft, PGT in place  Ext: no edema or cyanosis  Skin: warm, dry, no visible rash        Laboratory Studies:  CBC:                       8.8    6.52  )-----------( 195      ( 16 Nov 2022 05:45 )             28.7     CMP: 11-16    137  |  103  |  17  ----------------------------<  128<H>  3.7   |  29  |  1.13    Ca    8.0<L>      16 Nov 2022 05:45            Microbiology: reviewed    Culture - Sputum (collected 11-13-22 @ 01:44)  Source: Trach Asp Tracheal Aspirate  Gram Stain (11-13-22 @ 21:20):    No polymorphonuclear leukocytes per low power field    No Squamous epithelial cells per low power field    No organisms seen per oil power field  Final Report (11-16-22 @ 12:08):    Rare Stenotrophomonas maltophilia    Normal Respiratory Elvira present  Organism: Stenotrophomonas maltophilia (11-16-22 @ 12:08)  Organism: Stenotrophomonas maltophilia (11-16-22 @ 12:08)      -  Minocycline: S      Method Type: KB  Organism: Stenotrophomonas maltophilia (11-16-22 @ 12:08)      -  Levofloxacin: I 4      -  Trimethoprim/Sulfamethoxazole: S 2/38      Method Type: PRATIK    Culture - Urine (collected 11-12-22 @ 06:53)  Source: Clean Catch Clean Catch (Midstream)  Final Report (11-14-22 @ 00:07):    <10,000 CFU/mL Normal Urogenital Elvira    Culture - Blood (collected 11-12-22 @ 06:53)  Source: .Blood Blood-Peripheral  Preliminary Report (11-13-22 @ 13:01):    No growth to date.    Culture - Blood (collected 11-12-22 @ 06:53)  Source: .Blood Blood-Peripheral  Preliminary Report (11-13-22 @ 13:01):    No growth to date.    Culture - Abscess with Gram Stain (collected 11-07-22 @ 23:56)  Source: .Abscess foot  Final Report (11-13-22 @ 08:25):    Numerous Acinetobacter baumannii (Carbapenem Resistant) /nosocomialis    group    Few Candida tropicalis "Susceptibilities not performed"    Few Methicillin Resistant Staphylococcus aureus  Organism: Acinetobacter baumannii (Carbapenem Resistant)  Methicillin resistant Staphylococcus aureus (11-13-22 @ 08:25)  Organism: Methicillin resistant Staphylococcus aureus (11-13-22 @ 08:25)      -  Ampicillin/Sulbactam: R 16/8      -  Cefazolin: R >16      -  Clindamycin: S <=0.25      -  Daptomycin: S 1      -  Erythromycin: R >4      -  Gentamicin: S <=1 Should not be used as monotherapy      -  Linezolid: S 2      -  Oxacillin: R >2      -  Penicillin: R >8      -  Rifampin: S <=1 Should not be used as monotherapy      -  Tetracycline: S <=1      -  Trimethoprim/Sulfamethoxazole: S <=0.5/9.5      -  Vancomycin: S 1      Method Type: PRATIK  Organism: Acinetobacter baumannii (Carbapenem Resistant) (11-13-22 @ 08:25)      -  Polymyxin B: R 4      Method Type: ETEST  Organism: Acinetobacter baumannii (Carbapenem Resistant) (11-13-22 @ 08:25)      -  Minocycline: S      -  Piperacillin/Tazobactam: R      Method Type: KB  Organism: Acinetobacter baumannii (Carbapenem Resistant) (11-13-22 @ 08:25)      -  Amikacin: R >32      -  Ampicillin/Sulbactam: S 8/4      -  Cefepime: R >16      -  Ceftazidime: R >16      -  Ciprofloxacin: R >2      -  Gentamicin: R >8      -  Imipenem: R >8      -  Levofloxacin: R >4      -  Meropenem: R >8      -  Tobramycin: S <=2      -  Trimethoprim/Sulfamethoxazole: R >2/38      Method Type: PRATIK    Culture - Sputum (collected 11-05-22 @ 06:39)  Source: Trach Asp Tracheal Aspirate  Gram Stain (11-05-22 @ 14:45):    Few Squamous epithelial cells per low power field    Few polymorphonuclear leukocytes per low power field    Rare Gram Positive Rods per oil power field    Rare Gram Negative Rods per oil power field  Final Report (11-09-22 @ 14:54):    Numerous Stenotrophomonas maltophilia    Moderate Serratia marcescens    Normal Respiratory Elvira present  Organism: Stenotrophomonas maltophilia  Serratia marcescens (11-09-22 @ 14:54)  Organism: Serratia marcescens (11-09-22 @ 14:54)      -  Amikacin: S <=16      -  Amoxicillin/Clavulanic Acid: R >16/8      -  Ampicillin: R >16 These ampicillin results predict results for amoxicillin      -  Ampicillin/Sulbactam: R >16/8 Enterobacter, Klebsiella aerogenes, Citrobacter, and Serratia may develop resistance during prolonged therapy (3-4 days)      -  Aztreonam: S <=4      -  Cefazolin: R >16 Enterobacter, Klebsiella aerogenes, Citrobacter, and Serratia may develop resistance during prolonged therapy (3-4 days)      -  Cefepime: S <=2      -  Cefoxitin: R >16      -  Ceftriaxone: R 8 Enterobacter, Klebsiella aerogenes, Citrobacter, and Serratia may develop resistance during prolonged therapy      -  Ciprofloxacin: R >2      -  Ertapenem: S <=0.5      -  Gentamicin: S <=2      -  Levofloxacin: R 2      -  Meropenem: S <=1      -  Piperacillin/Tazobactam: R 32      -  Tobramycin: S <=2      -  Trimethoprim/Sulfamethoxazole: S <=0.5/9.5      Method Type: PRATIK  Organism: Stenotrophomonas maltophilia (11-09-22 @ 14:54)      -  Minocycline: [id number]      Method Type: KB  Organism: Stenotrophomonas maltophilia (11-09-22 @ 14:54)      -  Levofloxacin: I 4      -  Trimethoprim/Sulfamethoxazole: S <=0.5/9.5      Method Type: PRATIK    Culture - Urine (collected 11-04-22 @ 13:54)  Source: Clean Catch Clean Catch (Midstream)  Final Report (11-07-22 @ 10:15):    >100,000 CFU/ml Enterococcus faecium (vancomycin resistant)    50,000 - 99,000 CFU/mL Candida albicans "Susceptibilities not performed"  Organism: Enterococcus faecium (vancomycin resistant) (11-07-22 @ 10:15)  Organism: Enterococcus faecium (vancomycin resistant) (11-07-22 @ 10:15)      -  Ampicillin: R >8 Predicts results to ampicillin/sulbactam, amoxacillin-clavulanate and  piperacillin-tazobactam.      -  Ciprofloxacin: R >2      -  Daptomycin: SDD 4 The breakpoint for SDD (sensitive dose dependent)is based on a dosage regimen of 8-12 mg/kg administered every 24 h in adults and is intended for serious infections due to E. faecium. Consultation with an infectious diseases specialist is recommended.      -  Levofloxacin: R >4      -  Linezolid: S 2      -  Nitrofurantoin: R >64 Should not be used to treat pyelonephritis.      -  Tetra/Doxy: S <=4      -  Vancomycin: R >16      Method Type: PRATIK    Culture - Blood (collected 11-04-22 @ 13:52)  Source: .Blood Blood-Peripheral  Final Report (11-09-22 @ 20:01):    No Growth Final    Culture - Blood (collected 11-04-22 @ 13:52)  Source: .Blood Blood-Peripheral  Final Report (11-09-22 @ 20:01):    No Growth Final          Radiology: reviewed      
Patient is a 76y old  Female who presents with a chief complaint of sepsis, leukocytosis, midline adjustment (14 Nov 2022 15:56)      BRIEF HOSPITAL COURSE: 77 y/o female with pmhx of CVA, dementia, chronic respiratory failure s/p trach/peg, cardiac arrest, CVA, quadriplegia, admittred on 11/12 for VAP and sepsis    Events last 24 hours: ***    PAST MEDICAL & SURGICAL HISTORY:  Dementia of frontal lobe type      Aphasic stroke      Diabetes mellitus      Respiratory failure      Hypertension      GERD (gastroesophageal reflux disease)      Constipation      Respiratory failure      CVA (cerebral vascular accident)      HTN (hypertension)      DM (diabetes mellitus)      Advanced dementia      COVID-19 virus detected      Quadriplegia      Pneumonia      Type II diabetes mellitus      Hx of appendectomy      Gastrostomy in place      Tracheostomy in place      Tracheostomy tube present      Feeding by G-tube          Review of Systems:  unable to obtain due to AMS      Medications:  vancomycin  IVPB 750 milliGRAM(s) IV Intermittent once  vancomycin  IVPB        midodrine 10 milliGRAM(s) Oral every 8 hours    albuterol    90 MICROgram(s) HFA Inhaler 2 Puff(s) Inhalation two times a day    acetaminophen     Tablet .. 650 milliGRAM(s) Oral every 6 hours PRN  LORazepam     Tablet 1 milliGRAM(s) Oral every 4 hours  LORazepam   Injectable 2 milliGRAM(s) IV Push every 6 hours PRN      heparin   Injectable 5000 Unit(s) SubCutaneous every 12 hours    aluminum hydroxide/magnesium hydroxide/simethicone Suspension 30 milliLiter(s) Oral every 8 hours  pantoprazole  Injectable 40 milliGRAM(s) IV Push daily  polyethylene glycol 3350 17 Gram(s) Oral every 12 hours  senna 3 Tablet(s) Oral at bedtime  simethicone 80 milliGRAM(s) Chew every 6 hours  sucralfate suspension 1 Gram(s) Oral four times a day      dextrose 50% Injectable 25 Gram(s) IV Push once  dextrose 50% Injectable 12.5 Gram(s) IV Push once  dextrose 50% Injectable 25 Gram(s) IV Push once  dextrose Oral Gel 15 Gram(s) Oral once PRN  glucagon  Injectable 1 milliGRAM(s) IntraMuscular once  insulin glargine Injectable (LANTUS) 8 Unit(s) SubCutaneous every morning  insulin lispro (ADMELOG) corrective regimen sliding scale   SubCutaneous every 6 hours    dextrose 5%. 1000 milliLiter(s) IV Continuous <Continuous>  dextrose 5%. 1000 milliLiter(s) IV Continuous <Continuous>  ferrous    sulfate 325 milliGRAM(s) Oral daily  lactated ringers. 1000 milliLiter(s) IV Continuous <Continuous>  multivitamin 1 Tablet(s) Oral daily      bacitracin   Ointment 1 Application(s) Topical daily  chlorhexidine 0.12% Liquid 15 milliLiter(s) Oral Mucosa every 12 hours  chlorhexidine 2% Cloths 1 Application(s) Topical <User Schedule>  Dakins Solution - Full Strength 1 Application(s) Topical two times a day  mineral oil/petrolatum Hydrophilic Ointment 1 Application(s) Topical daily  nystatin Powder 1 Application(s) Topical three times a day  povidone iodine 10% Solution 1 Application(s) Topical three times a day    lactobacillus acidophilus 2 Tablet(s) Oral daily      Mode: AC/ CMV (Assist Control/ Continuous Mandatory Ventilation)  RR (machine): 18  TV (machine): 400  FiO2: 40  PEEP: 5  ITime: 1  MAP: 19  PIP: 48      ICU Vital Signs Last 24 Hrs  T(C): 36.1 (14 Nov 2022 15:39), Max: 37.9 (14 Nov 2022 00:27)  T(F): 97 (14 Nov 2022 15:39), Max: 100.2 (14 Nov 2022 00:27)  HR: 67 (14 Nov 2022 18:03) (48 - 97)  BP: 149/79 (14 Nov 2022 18:03) (95/53 - 149/79)  BP(mean): 101 (14 Nov 2022 18:03) (66 - 101)  ABP: --  ABP(mean): --  RR: 23 (14 Nov 2022 18:03) (12 - 26)  SpO2: 100% (14 Nov 2022 17:32) (94% - 100%)    O2 Parameters below as of 14 Nov 2022 07:31  Patient On (Oxygen Delivery Method): ventilator,40                I&O's Detail    13 Nov 2022 07:01  -  14 Nov 2022 07:00  --------------------------------------------------------  IN:    IV PiggyBack: 100 mL    Lactated Ringers: 1150 mL  Total IN: 1250 mL    OUT:    Indwelling Catheter - Urethral (mL): 650 mL    Stool (mL): 3 mL  Total OUT: 653 mL    Total NET: 597 mL      14 Nov 2022 07:01  -  14 Nov 2022 19:59  --------------------------------------------------------  IN:    Lactated Ringers: 600 mL  Total IN: 600 mL    OUT:    Indwelling Catheter - Urethral (mL): 250 mL  Total OUT: 250 mL    Total NET: 350 mL            LABS:                        7.7    9.41  )-----------( 155      ( 14 Nov 2022 05:30 )             25.2     11-14    139  |  103  |  21  ----------------------------<  119<H>  4.2   |  30  |  1.31<H>    Ca    7.8<L>      14 Nov 2022 05:30  Phos  2.6     11-13    TPro  6.4  /  Alb  1.7<L>  /  TBili  0.6  /  DBili  x   /  AST  21  /  ALT  <10<L>  /  AlkPhos  93  11-14          CAPILLARY BLOOD GLUCOSE      POCT Blood Glucose.: 80 mg/dL (14 Nov 2022 17:32)    PT/INR - ( 13 Nov 2022 06:48 )   PT: 15.9 sec;   INR: 1.38 ratio             CULTURES:  Culture Results:   Normal Respiratory Elvira present (11-13 @ 01:44)  Rapid RVP Result: NotDetec (11-12 @ 06:53)  Culture Results:   No growth to date. (11-12 @ 06:53)  Culture Results:   No growth to date. (11-12 @ 06:53)  Culture Results:   <10,000 CFU/mL Normal Urogenital Elvira (11-12 @ 06:53)  Culture Results:   Numerous Acinetobacter baumannii (Carbapenem Resistant) /nosocomialis  group  Few Candida tropicalis "Susceptibilities not performed"  Few Methicillin Resistant Staphylococcus aureus (11-07 @ 23:56)      Physical Examination:    General: No acute distress.      HEENT: Pupils equal, reactive to light.  Symmetric.    PULM: Clear to auscultation bilaterally, no significant sputum production    NECK: Supple, no lymphadenopathy, trachea midline    CVS: Regular rate and rhythm, no murmurs, rubs, or gallops    ABD: Soft, nondistended, nontender, normoactive bowel sounds, no masses    EXT: No edema, nontender    SKIN: Warm and well perfused, no rashes noted.    NEURO: awake. interacts with family    RADIOLOGY:  reviewed    
Patient is a 76y old  Female who presents with a chief complaint of sepsis, leukocytosis, midline adjustment (2022 13:57)      BRIEF HOSPITAL COURSE: Pt is a 75 y/o F pmhx of dementia, COPD, chronic RF requiring trach and peg, cardiac arrest, quadriplegia, CVA, CKD w/ previous hospital admissions for respiratory distress presents to [facility] ED w/ complaints of SOB and low grade fever. Pt admitted to [facility] for sepsis in setting of possible VAP and started on Meropenem and bactrim.      Events last 24 hours: Fio2 titrated up to 70% for hypoxia.     PAST MEDICAL & SURGICAL HISTORY:  Dementia of frontal lobe type      Aphasic stroke      Diabetes mellitus      Respiratory failure      Hypertension      GERD (gastroesophageal reflux disease)      Constipation      Respiratory failure      CVA (cerebral vascular accident)      HTN (hypertension)      DM (diabetes mellitus)      Advanced dementia      COVID-19 virus detected      Quadriplegia      Pneumonia      Type II diabetes mellitus      Hx of appendectomy      Gastrostomy in place      Tracheostomy in place      Tracheostomy tube present      Feeding by G-tube          Review of Systems:  Unable to assess secondary to mentation       Medications:  meropenem  IVPB      trimethoprim   80 mG/sulfamethoxazole 400 mG 1 Tablet(s) Oral two times a day    midodrine 10 milliGRAM(s) Oral every 8 hours    albuterol    90 MICROgram(s) HFA Inhaler 2 Puff(s) Inhalation two times a day    acetaminophen     Tablet .. 650 milliGRAM(s) Oral every 6 hours PRN  LORazepam     Tablet 1 milliGRAM(s) Oral every 4 hours  LORazepam   Injectable 2 milliGRAM(s) IV Push every 6 hours PRN      heparin   Injectable 5000 Unit(s) SubCutaneous every 12 hours    pantoprazole    Tablet 40 milliGRAM(s) Oral two times a day  polyethylene glycol 3350 17 Gram(s) Oral every 12 hours  senna 3 Tablet(s) Oral at bedtime  simethicone 80 milliGRAM(s) Chew every 6 hours  sucralfate suspension 1 Gram(s) Oral four times a day      dextrose 50% Injectable 25 Gram(s) IV Push once  dextrose 50% Injectable 12.5 Gram(s) IV Push once  dextrose 50% Injectable 25 Gram(s) IV Push once  dextrose Oral Gel 15 Gram(s) Oral once PRN  glucagon  Injectable 1 milliGRAM(s) IntraMuscular once  insulin glargine Injectable (LANTUS) 8 Unit(s) SubCutaneous every morning  insulin lispro (ADMELOG) corrective regimen sliding scale   SubCutaneous at bedtime    dextrose 5%. 1000 milliLiter(s) IV Continuous <Continuous>  dextrose 5%. 1000 milliLiter(s) IV Continuous <Continuous>  ferrous    sulfate 325 milliGRAM(s) Oral daily  multivitamin 1 Tablet(s) Oral daily      bacitracin   Ointment 1 Application(s) Topical daily  chlorhexidine 0.12% Liquid 15 milliLiter(s) Oral Mucosa every 12 hours  Dakins Solution - Full Strength 1 Application(s) Topical two times a day  mineral oil/petrolatum Hydrophilic Ointment 1 Application(s) Topical daily  nystatin Powder 1 Application(s) Topical three times a day    lactobacillus acidophilus 2 Tablet(s) Oral daily      Mode: AC/ CMV (Assist Control/ Continuous Mandatory Ventilation)  RR (machine): 18  TV (machine): 400  FiO2: 70  PEEP: 5  ITime: 1  MAP: 11  PIP: 36      ICU Vital Signs Last 24 Hrs  T(C): 36.9 (2022 11:45), Max: 37.8 (2022 06:10)  T(F): 98.4 (2022 11:45), Max: 100 (2022 06:10)  HR: 111 (2022 19:00) (76 - 125)  BP: 177/70 (2022 19:00) (94/48 - 177/70)  BP(mean): 100 (2022 19:00) (63 - 100)  ABP: --  ABP(mean): --  RR: 29 (2022 19:00) (11 - 41)  SpO2: 92% (2022 19:00) (90% - 100%)    O2 Parameters below as of 2022 19:00  Patient On (Oxygen Delivery Method): ventilator    O2 Concentration (%): 70            I&O's Detail    2022 07:01  -  2022 19:56  --------------------------------------------------------  IN:  Total IN: 0 mL    OUT:    Voided (mL): 350 mL  Total OUT: 350 mL    Total NET: -350 mL            LABS:                        10.3   15.44 )-----------( 264      ( 2022 06:53 )             34.3         136  |  100  |  21  ----------------------------<  168<H>  5.3   |  30  |  1.18    Ca    9.4      2022 06:53    TPro  8.8<H>  /  Alb  2.3<L>  /  TBili  0.4  /  DBili  x   /  AST  18  /  ALT  15  /  AlkPhos  145<H>            CAPILLARY BLOOD GLUCOSE      POCT Blood Glucose.: 111 mg/dL (2022 18:34)    PT/INR - ( 2022 06:53 )   PT: 14.7 sec;   INR: 1.24 ratio         PTT - ( 2022 06:53 )  PTT:45.6 sec  Urinalysis Basic - ( 2022 06:53 )    Color: Yellow / Appearance: Clear / S.020 / pH: x  Gluc: x / Ketone: Negative  / Bili: Negative / Urobili: Negative mg/dL   Blood: x / Protein: 100 mg/dL / Nitrite: Negative   Leuk Esterase: Trace / RBC: 0-2 /HPF / WBC 6-10   Sq Epi: x / Non Sq Epi: Few / Bacteria: Few      CULTURES:  Rapid RVP Result: NotDetec (22 @ 06:53)  Culture Results:   Numerous Acinetobacter baumannii (Carbapenem Resistant) /nosocomialis  group  Few Candida tropicalis "Susceptibilities not performed"  Few Methicillin Resistant Staphylococcus aureus (22 @ 23:56)      Physical Examination:    General: Pt laying in hospital bed in mild respiratory distress.  Responsive.     HEENT: Pupils equal, reactive to light.  Symmetric.    PULM: Tachypneic, Clear to auscultation bilaterally, no significant sputum production    CVS: Regular rate and rhythm, no murmurs, rubs, or gallops    ABD: Soft, nondistended, nontender, normoactive bowel sounds, no masses    EXT: No edema, nontender    SKIN: Warm and well perfused.     NEURO: Confused unable to assess.       RADIOLOGY:   < from: Xray Chest 1 View AP/PA (22 @ 08:39) >  ACC: [id number] EXAM:  XR CHEST AP OR PA 1V                          PROCEDURE DATE:  2022          INTERPRETATION:  Chest AP portable        CLINICAL HISTORY: Respiratory distress, sepsis    COMPARISON: 2022    FINDINGS: Diffuse bilaterallung infiltrates are again apparent with   associated right-sided pleural effusion. The overall pattern is not   significantly changed.    Tracheostomy is in place.    No change in the heart or mediastinal configuration allowing for   technique.    IMPRESSION: Limited portable examination with diffuse lung infiltrates.   No significant change.    --- End of Report ---            JOSHUA JAMES MD; Attending Radiologist  This document has been electronically signed. 2022  1:13PM          CRITICAL CARE TIME SPENT: 43 minutes assessing presenting problems of acute illness, which pose high probability of life threatening deterioration or end organ damage/dysfunction, as well as medical decision making including initiating plan of care, reviewing data, reviewing radiologic exams, discussing with multidisciplinary team,  discussing goals of care with patient/family, and writing this note.  Non-inclusive of procedures performed,   
Date/Time Patient Seen:  		  Referring MD:   Data Reviewed	       Patient is a 76y old  Female who presents with a chief complaint of sepsis, leukocytosis, midline adjustment (13 Nov 2022 07:56)      Subjective/HPI     PAST MEDICAL & SURGICAL HISTORY:  Dementia of frontal lobe type    Aphasic stroke    Diabetes mellitus    Respiratory failure    Hypertension    GERD (gastroesophageal reflux disease)    Constipation    Respiratory failure    CVA (cerebral vascular accident)    HTN (hypertension)    DM (diabetes mellitus)    Advanced dementia    COVID-19 virus detected    Quadriplegia    Pneumonia    Type II diabetes mellitus    Hx of appendectomy    Gastrostomy in place    Tracheostomy in place    Tracheostomy tube present    Feeding by G-tube          Medication list         MEDICATIONS  (STANDING):  albuterol    90 MICROgram(s) HFA Inhaler 2 Puff(s) Inhalation two times a day  bacitracin   Ointment 1 Application(s) Topical daily  chlorhexidine 0.12% Liquid 15 milliLiter(s) Oral Mucosa every 12 hours  Dakins Solution - Full Strength 1 Application(s) Topical two times a day  dextrose 5%. 1000 milliLiter(s) (50 mL/Hr) IV Continuous <Continuous>  dextrose 5%. 1000 milliLiter(s) (100 mL/Hr) IV Continuous <Continuous>  dextrose 50% Injectable 25 Gram(s) IV Push once  dextrose 50% Injectable 12.5 Gram(s) IV Push once  dextrose 50% Injectable 25 Gram(s) IV Push once  ferrous    sulfate 325 milliGRAM(s) Oral daily  glucagon  Injectable 1 milliGRAM(s) IntraMuscular once  heparin   Injectable 5000 Unit(s) SubCutaneous every 12 hours  insulin glargine Injectable (LANTUS) 8 Unit(s) SubCutaneous every morning  insulin lispro (ADMELOG) corrective regimen sliding scale   SubCutaneous every 6 hours  lactated ringers. 1000 milliLiter(s) (50 mL/Hr) IV Continuous <Continuous>  lactobacillus acidophilus 2 Tablet(s) Oral daily  LORazepam     Tablet 1 milliGRAM(s) Oral every 4 hours  meropenem  IVPB      meropenem  IVPB 1000 milliGRAM(s) IV Intermittent every 12 hours  midodrine 10 milliGRAM(s) Oral every 8 hours  mineral oil/petrolatum Hydrophilic Ointment 1 Application(s) Topical daily  multivitamin 1 Tablet(s) Oral daily  nystatin Powder 1 Application(s) Topical three times a day  pantoprazole  Injectable 40 milliGRAM(s) IV Push daily  polyethylene glycol 3350 17 Gram(s) Oral every 12 hours  povidone iodine 10% Solution 1 Application(s) Topical three times a day  senna 3 Tablet(s) Oral at bedtime  simethicone 80 milliGRAM(s) Chew every 6 hours  sucralfate suspension 1 Gram(s) Oral four times a day  trimethoprim   80 mG/sulfamethoxazole 400 mG 1 Tablet(s) Oral two times a day    MEDICATIONS  (PRN):  acetaminophen     Tablet .. 650 milliGRAM(s) Oral every 6 hours PRN Temp greater or equal to 38C (100.4F), Mild Pain (1 - 3)  dextrose Oral Gel 15 Gram(s) Oral once PRN Blood Glucose LESS THAN 70 milliGRAM(s)/deciliter  LORazepam   Injectable 2 milliGRAM(s) IV Push every 6 hours PRN Agitation         Vitals log        ICU Vital Signs Last 24 Hrs  T(C): 37 (13 Nov 2022 07:38), Max: 37.7 (12 Nov 2022 23:00)  T(F): 98.6 (13 Nov 2022 07:38), Max: 99.8 (12 Nov 2022 23:00)  HR: 70 (13 Nov 2022 06:59) (57 - 130)  BP: 93/48 (13 Nov 2022 06:00) (83/42 - 178/83)  BP(mean): 63 (13 Nov 2022 06:00) (55 - 112)  ABP: --  ABP(mean): --  RR: 18 (13 Nov 2022 06:00) (11 - 44)  SpO2: 98% (13 Nov 2022 06:59) (90% - 100%)    O2 Parameters below as of 13 Nov 2022 06:52  Patient On (Oxygen Delivery Method): ventilator,.50             Mode: AC/ CMV (Assist Control/ Continuous Mandatory Ventilation)  RR (machine): 18  TV (machine): 400  FiO2: 50  PEEP: 5  ITime: 1  MAP: 13  PIP: 33      Input and Output:  I&O's Detail    12 Nov 2022 07:01  -  13 Nov 2022 07:00  --------------------------------------------------------  IN:    Enteral Tube Flush: 240 mL    IV PiggyBack: 50 mL    Jevity 1.5: 300 mL  Total IN: 590 mL    OUT:    Voided (mL): 650 mL  Total OUT: 650 mL    Total NET: -60 mL          Lab Data                        7.8    7.16  )-----------( 174      ( 13 Nov 2022 06:48 )             25.5     11-13    139  |  103  |  24<H>  ----------------------------<  124<H>  4.3   |  30  |  1.27    Ca    8.4      13 Nov 2022 06:48  Phos  2.6     11-13    TPro  6.6  /  Alb  1.8<L>  /  TBili  0.3  /  DBili  x   /  AST  28  /  ALT  13  /  AlkPhos  106  11-13            Review of Systems	      Objective     Physical Examination    heart s1s2  lung dec BS  head nc      Pertinent Lab findings & Imaging      Annalise:  NO   Adequate UO     I&O's Detail    12 Nov 2022 07:01  -  13 Nov 2022 07:00  --------------------------------------------------------  IN:    Enteral Tube Flush: 240 mL    IV PiggyBack: 50 mL    Jevity 1.5: 300 mL  Total IN: 590 mL    OUT:    Voided (mL): 650 mL  Total OUT: 650 mL    Total NET: -60 mL               Discussed with:     Cultures:	        Radiology                            
INTERVAL HPI/OVERNIGHT EVENTS:  No new overnight event.  No N/V/D.  Tolerating diet via peg    Allergies    codeine (Hives)    Intolerances    General:  No wt loss, fevers, chills, night sweats, fatigue,   Eyes:  Good vision, no reported pain  ENT:  No sore throat, pain, runny nose, dysphagia  CV:  No pain, palpitations, hypo/hypertension  Resp:  No dyspnea, cough, tachypnea, wheezing  GI:  No pain, No nausea, No vomiting, No diarrhea, No constipation, No weight loss, No fever, No pruritis, No rectal bleeding, No tarry stools, No dysphagia,  :  No pain, bleeding, incontinence, nocturia  Muscle:  No pain, weakness  Neuro:  No weakness, tingling, memory problems  Psych:  No fatigue, insomnia, mood problems, depression  Endocrine:  No polyuria, polydipsia, cold/heat intolerance  Heme:  No petechiae, ecchymosis, easy bruisability  Skin:  No rash, tattoos, scars, edema      PHYSICAL EXAM:   Vital Signs:  Vital Signs Last 24 Hrs  T(C): 37 (2022 16:10), Max: 37 (2022 16:10)  T(F): 98.6 (2022 16:10), Max: 98.6 (2022 16:10)  HR: 94 (2022 16:00) (53 - 121)  BP: 136/76 (2022 16:00) (91/59 - 136/116)  BP(mean): 93 (2022 16:00) (69 - 124)  RR: 24 (2022 16:00) (0 - 33)  SpO2: 96% (2022 16:00) (96% - 100%)    Parameters below as of 2022 16:00  Patient On (Oxygen Delivery Method): ventilator    O2 Concentration (%): 40  Daily     Daily Weight in k.6 (2022 06:00)I&O's Summary    15 Nov 2022 07:01  -  2022 07:00  --------------------------------------------------------  IN: 1440 mL / OUT: 750 mL / NET: 690 mL    2022 07:01  -  2022 17:09  --------------------------------------------------------  IN: 610 mL / OUT: 500 mL / NET: 110 mL        GENERAL:  Appears stated age, well-groomed, well-nourished, no distress  HEENT:  NC/AT,  conjunctivae clear and pink, no thyromegaly, nodules, adenopathy, no JVD, sclera -anicteric  CHEST:  Full & symmetric excursion, no increased effort, breath sounds clear  HEART:  Regular rhythm, S1, S2, no murmur/rub/S3/S4, no abdominal bruit, no edema  ABDOMEN:  Soft, non-tender, non-distended, normoactive bowel sounds,  no masses ,no hepato-splenomegaly, no signs of chronic liver disease  EXTEREMITIES:  no cyanosis,clubbing or edema  SKIN:  No rash/erythema/ecchymoses/petechiae/wounds/abscess/warm/dry  NEURO:  Alert, oriented, no asterixis, no tremor, no encephalopathy      LABS:                        8.8    6.52  )-----------( 195      ( 2022 05:45 )             28.7     11-16    137  |  103  |  17  ----------------------------<  128<H>  3.7   |  29  |  1.13    Ca    8.0<L>      2022 05:45          amylase   lipase  RADIOLOGY & ADDITIONAL TESTS:  
INTERVAL HPI/OVERNIGHT EVENTS:  No new overnight event.  No N/V/D.  Tolerating diet via peg not leaking    Allergies    codeine (Hives)    Intolerances    General:  No wt loss, fevers, chills, night sweats, fatigue,   Eyes:  Good vision, no reported pain  ENT:  No sore throat, pain, runny nose, dysphagia  CV:  No pain, palpitations, hypo/hypertension  Resp:  No dyspnea, cough, tachypnea, wheezing  GI:  No pain, No nausea, No vomiting, No diarrhea, No constipation, No weight loss, No fever, No pruritis, No rectal bleeding, No tarry stools, No dysphagia,  :  No pain, bleeding, incontinence, nocturia  Muscle:  No pain, weakness  Neuro:  No weakness, tingling, memory problems  Psych:  No fatigue, insomnia, mood problems, depression  Endocrine:  No polyuria, polydipsia, cold/heat intolerance  Heme:  No petechiae, ecchymosis, easy bruisability  Skin:  No rash, tattoos, scars, edema      PHYSICAL EXAM:   Vital Signs:  Vital Signs Last 24 Hrs  T(C): 36.4 (15 Nov 2022 06:00), Max: 36.6 (2022 08:30)  T(F): 97.6 (15 Nov 2022 06:00), Max: 97.9 (2022 08:30)  HR: 50 (15 Nov 2022 07:50) (46 - 67)  BP: 124/60 (15 Nov 2022 07:00) (98/56 - 149/79)  BP(mean): 79 (15 Nov 2022 07:00) (70 - 101)  RR: 18 (15 Nov 2022 07:00) (14 - 26)  SpO2: 100% (15 Nov 2022 07:50) (94% - 100%)    Parameters below as of 15 Nov 2022 07:50  Patient On (Oxygen Delivery Method): ventilator,40      Daily     Daily Weight in k (15 Nov 2022 06:00)I&O's Summary    2022 07:01  -  15 Nov 2022 07:00  --------------------------------------------------------  IN: 1870 mL / OUT: 551 mL / NET: 1319 mL        GENERAL:  Appears stated age, well-groomed, well-nourished, no distress  HEENT:  NC/AT,  conjunctivae clear and pink, no thyromegaly, nodules, adenopathy, no JVD, sclera -anicteric  CHEST:  Full & symmetric excursion, no increased effort, breath sounds clear  HEART:  Regular rhythm, S1, S2, no murmur/rub/S3/S4, no abdominal bruit, no edema  ABDOMEN:  Soft, non-tender, non-distended, normoactive bowel sounds,  no masses ,no hepato-splenomegaly, no signs of chronic liver disease  EXTEREMITIES:  no cyanosis,clubbing or edema  SKIN:  No rash/erythema/ecchymoses/petechiae/wounds/abscess/warm/dry  NEURO:  Alert, oriented, no asterixis, no tremor, no encephalopathy      LABS:                        8.6    6.32  )-----------( 163      ( 15 Nov 2022 06:31 )             28.0     11-15    137  |  103  |  18  ----------------------------<  93  3.8   |  29  |  1.04    Ca    7.9<L>      15 Nov 2022 06:31    TPro  6.4  /  Alb  1.7<L>  /  TBili  0.6  /  DBili  x   /  AST  21  /  ALT  <10<L>  /  AlkPhos  93  11-14        amylase   lipase  RADIOLOGY & ADDITIONAL TESTS:  
INTERVAL HPI/OVERNIGHT EVENTS:  No new overnight event.  No N/V/D.  Tolerating diet.  some leaking around the peg    Allergies    codeine (Hives)    Intolerances    General:  No wt loss, fevers, chills, night sweats, fatigue,   Eyes:  Good vision, no reported pain  ENT:  No sore throat, pain, runny nose, dysphagia  CV:  No pain, palpitations, hypo/hypertension  Resp:  No dyspnea, cough, tachypnea, wheezing  GI:  No pain, No nausea, No vomiting, No diarrhea, No constipation, No weight loss, No fever, No pruritis, No rectal bleeding, No tarry stools, No dysphagia,  :  No pain, bleeding, incontinence, nocturia  Muscle:  No pain, weakness  Neuro:  No weakness, tingling, memory problems  Psych:  No fatigue, insomnia, mood problems, depression  Endocrine:  No polyuria, polydipsia, cold/heat intolerance  Heme:  No petechiae, ecchymosis, easy bruisability  Skin:  No rash, tattoos, scars, edema      PHYSICAL EXAM:   Vital Signs:  Vital Signs Last 24 Hrs  T(C): 36.6 (2022 08:30), Max: 37.9 (2022 00:27)  T(F): 97.9 (2022 08:30), Max: 100.2 (2022 00:27)  HR: 48 (2022 08:00) (48 - 97)  BP: 122/58 (2022 08:00) (88/43 - 165/71)  BP(mean): 77 (2022 08:00) (58 - 98)  RR: 0 (2022 08:00) (0 - 34)  SpO2: 99% (2022 07:32) (93% - 100%)    Parameters below as of 2022 07:31  Patient On (Oxygen Delivery Method): ventilator,40      Daily     Daily Weight in k.3 (2022 05:50)I&O's Summary    2022 07:01  -  2022 07:00  --------------------------------------------------------  IN: 1250 mL / OUT: 653 mL / NET: 597 mL        GENERAL:  Appears stated age, well-groomed, well-nourished, no distress  HEENT:  NC/AT,  conjunctivae clear and pink, no thyromegaly, nodules, adenopathy, no JVD, sclera -anicteric  CHEST:  Full & symmetric excursion, no increased effort, breath sounds clear  HEART:  Regular rhythm, S1, S2, no murmur/rub/S3/S4, no abdominal bruit, no edema  ABDOMEN:  Soft, non-tender, non-distended, normoactive bowel sounds,  no masses ,no hepato-splenomegaly, no signs of chronic liver disease  EXTEREMITIES:  no cyanosis,clubbing or edema  SKIN:  No rash/erythema/ecchymoses/petechiae/wounds/abscess/warm/dry  NEURO:  Alert, oriented, no asterixis, no tremor, no encephalopathy      LABS:                        7.7    9.41  )-----------( 155      ( 2022 05:30 )             25.2     11-14    139  |  103  |  21  ----------------------------<  119<H>  4.2   |  30  |  1.31<H>    Ca    7.8<L>      2022 05:30  Phos  2.6     11-13    TPro  6.4  /  Alb  1.7<L>  /  TBili  0.6  /  DBili  x   /  AST  21  /  ALT  <10<L>  /  AlkPhos  93  11-14    PT/INR - ( 2022 06:48 )   PT: 15.9 sec;   INR: 1.38 ratio             amylase   lipase  RADIOLOGY & ADDITIONAL TESTS:  
Patient is a 76y Female with a known history of :  Sepsis [A41.9]    Diabetes [E11.9]    Chronic obstructive pulmonary disease (COPD) [J44.9]    Anemia of chronic disease [D63.8]    Need for prophylactic measure [Z29.9]      HPI:  76 year old female with PMHx of dementia, COPD with chronic respiratory failure s/p trach, cardiac arrest, CHH, quadriplegia, CVA, CKD, anemia, PEG tube, and multiple hospital admissions for respiratory distress presents to the ED BIBEMS from [facility] for shortness of breath, found ot have low grade fever, midline malfunction, and leukocytosis to 16, fever, concerning for sepsis.  Patient was discharged 2 days ago, but developed this morning increased tachypnea, respiratory distress and it appeared her midline was no longer working, and patient might not be able to receive the antibiotics bactrim and doxycycline which had been prescribed for her to continue in outpt setting after discharge form the hospital.   (12 Nov 2022 08:42)      REVIEW OF SYSTEMS:    CONSTITUTIONAL: No fever, weight loss, or fatigue  EYES: No eye pain, visual disturbances, or discharge  ENMT:  No difficulty hearing, tinnitus, vertigo; No sinus or throat pain  NECK: No pain or stiffness  BREASTS: No pain, masses, or nipple discharge  RESPIRATORY: No cough, wheezing, chills or hemoptysis; No shortness of breath  CARDIOVASCULAR: No chest pain, palpitations, dizziness, or leg swelling  GASTROINTESTINAL: No abdominal or epigastric pain. No nausea, vomiting, or hematemesis; No diarrhea or constipation. No melena or hematochezia.  GENITOURINARY: No dysuria, frequency, hematuria, or incontinence  NEUROLOGICAL: No headaches, memory loss, loss of strength, numbness, or tremors  SKIN: No itching, burning, rashes, or lesions   LYMPH NODES: No enlarged glands  ENDOCRINE: No heat or cold intolerance; No hair loss  MUSCULOSKELETAL: No joint pain or swelling; No muscle, back, or extremity pain  PSYCHIATRIC: No depression, anxiety, mood swings, or difficulty sleeping  HEME/LYMPH: No easy bruising, or bleeding gums  ALLERGY AND IMMUNOLOGIC: No hives or eczema    MEDICATIONS  (STANDING):  albuterol    90 MICROgram(s) HFA Inhaler 2 Puff(s) Inhalation two times a day  bacitracin   Ointment 1 Application(s) Topical daily  chlorhexidine 0.12% Liquid 15 milliLiter(s) Oral Mucosa every 12 hours  Dakins Solution - Full Strength 1 Application(s) Topical two times a day  dextrose 5%. 1000 milliLiter(s) (50 mL/Hr) IV Continuous <Continuous>  dextrose 5%. 1000 milliLiter(s) (100 mL/Hr) IV Continuous <Continuous>  dextrose 50% Injectable 25 Gram(s) IV Push once  dextrose 50% Injectable 12.5 Gram(s) IV Push once  dextrose 50% Injectable 25 Gram(s) IV Push once  ferrous    sulfate 325 milliGRAM(s) Oral daily  glucagon  Injectable 1 milliGRAM(s) IntraMuscular once  heparin   Injectable 5000 Unit(s) SubCutaneous every 12 hours  insulin glargine Injectable (LANTUS) 8 Unit(s) SubCutaneous every morning  insulin lispro (ADMELOG) corrective regimen sliding scale   SubCutaneous every 6 hours  lactated ringers. 1000 milliLiter(s) (50 mL/Hr) IV Continuous <Continuous>  lactobacillus acidophilus 2 Tablet(s) Oral daily  LORazepam     Tablet 1 milliGRAM(s) Oral every 4 hours  meropenem  IVPB      meropenem  IVPB 1000 milliGRAM(s) IV Intermittent every 12 hours  midodrine 10 milliGRAM(s) Oral every 8 hours  mineral oil/petrolatum Hydrophilic Ointment 1 Application(s) Topical daily  multivitamin 1 Tablet(s) Oral daily  nystatin Powder 1 Application(s) Topical three times a day  pantoprazole  Injectable 40 milliGRAM(s) IV Push daily  polyethylene glycol 3350 17 Gram(s) Oral every 12 hours  povidone iodine 10% Solution 1 Application(s) Topical three times a day  senna 3 Tablet(s) Oral at bedtime  simethicone 80 milliGRAM(s) Chew every 6 hours  sucralfate suspension 1 Gram(s) Oral four times a day  trimethoprim   80 mG/sulfamethoxazole 400 mG 1 Tablet(s) Oral two times a day    MEDICATIONS  (PRN):  acetaminophen     Tablet .. 650 milliGRAM(s) Oral every 6 hours PRN Temp greater or equal to 38C (100.4F), Mild Pain (1 - 3)  dextrose Oral Gel 15 Gram(s) Oral once PRN Blood Glucose LESS THAN 70 milliGRAM(s)/deciliter  LORazepam   Injectable 2 milliGRAM(s) IV Push every 6 hours PRN Agitation      ALLERGIES: codeine (Hives)      FAMILY HISTORY:  No pertinent family history in first degree relatives        Social history:  Alochol:   Smoking:   Drug Use:   Marital Status:     PHYSICAL EXAMINATION:  -----------------------------  T(C): 37.6 (11-13-22 @ 04:30), Max: 37.7 (11-12-22 @ 23:00)  HR: 70 (11-13-22 @ 06:59) (57 - 130)  BP: 93/48 (11-13-22 @ 06:00) (83/42 - 178/83)  RR: 18 (11-13-22 @ 06:00) (11 - 44)  SpO2: 98% (11-13-22 @ 06:59) (90% - 100%)  Wt(kg): --    11-12 @ 07:01  -  11-13 @ 07:00  --------------------------------------------------------  IN:    Enteral Tube Flush: 240 mL    IV PiggyBack: 50 mL    Jevity 1.5: 300 mL  Total IN: 590 mL    OUT:    Voided (mL): 650 mL  Total OUT: 650 mL    Total NET: -60 mL            Constitutional: well developed, normal appearance, well groomed, well nourished, no deformities and no acute distress.   Eyes: the conjunctiva exhibited no abnormalities and the eyelids demonstrated no xanthelasmas.   HEENT: normal oral mucosa, no oral pallor and no oral cyanosis.   Neck: normal jugular venous A waves present, normal jugular venous V waves present and no jugular venous obregon A waves.   Pulmonary: no respiratory distress, normal respiratory rhythm and effort, no accessory muscle use and lungs were clear to auscultation bilaterally. B/L anterior scattered rhonchi  Cardiovascular: heart rate and rhythm were normal, normal S1 and S2 and no murmur, gallop, rub, heave or thrill are present.   Musculoskeletal: the gait could not be assessed.   Extremities: no clubbing of the fingernails, no localized cyanosis, no petechial hemorrhages and no ischemic changes.   Skin: normal skin color and pigmentation, no rash, no venous stasis, no skin lesions, no skin ulcer and no xanthoma was observed.     LABS:   --------  11-12    136  |  100  |  21  ----------------------------<  168<H>  5.3   |  30  |  1.18    Ca    9.4      12 Nov 2022 06:53    TPro  8.8<H>  /  Alb  2.3<L>  /  TBili  0.4  /  DBili  x   /  AST  18  /  ALT  15  /  AlkPhos  145<H>  11-12                         10.3   15.44 )-----------( 264      ( 12 Nov 2022 06:53 )             34.3     PT/INR - ( 13 Nov 2022 06:48 )   PT: 15.9 sec;   INR: 1.38 ratio         PTT - ( 12 Nov 2022 06:53 )  PTT:45.6 sec              Radiology:    
Patient is a 76y old  Female who presents with a chief complaint of As per H&P "The pt is a 76 year old female with PMHx of dementia, COPD with chronic respiratory failure s/p trach, cardiac arrest, CHH, quadriplegia, CVA, CKD, anemia, PEG tube, and multiple hospital admissions for respiratory distress presents to the ED BIBEMS from [facility] for shortness of breath, found ot have low grade fever, midline malfunction, and leukocytosis to 16, fever, concerning for sepsis.  Patient was discharged 2 days ago, but developed this morning increased tachypnea, respiratory distress and it appeared her midline was no longer working, and patient might not be able to receive the antibiotics bactrim and doxycycline which had been prescribed for her to continue in outpt setting after discharge form the hospital."     (2022 11:44)      BRIEF HOSPITAL COURSE: Pt is a 75 y/o F pmhx of dementia, COPD, chronic RF requiring trach and peg, cardiac arrest, quadriplegia, CVA, CKD w/ previous hospital admissions for respiratory distress presents to  ED w/ complaints of SOB and low grade fever. Pt admitted to [facility] for sepsis in setting of possible VAP and started on Meropenem and bactrim.      Events last 24 hours: FiO2 titrated down to 40%.     PAST MEDICAL & SURGICAL HISTORY:  Dementia of frontal lobe type      Aphasic stroke      Diabetes mellitus      Respiratory failure      Hypertension      GERD (gastroesophageal reflux disease)      Constipation      Respiratory failure      CVA (cerebral vascular accident)      HTN (hypertension)      DM (diabetes mellitus)      Advanced dementia      COVID-19 virus detected      Quadriplegia      Pneumonia      Type II diabetes mellitus      Hx of appendectomy      Gastrostomy in place      Tracheostomy in place      Tracheostomy tube present      Feeding by G-tube          Review of Systems:  Unable to assess secondary to trach.       Medications:  meropenem  IVPB      meropenem  IVPB 1000 milliGRAM(s) IV Intermittent every 12 hours  trimethoprim   80 mG/sulfamethoxazole 400 mG 1 Tablet(s) Oral two times a day    midodrine 10 milliGRAM(s) Oral every 8 hours    albuterol    90 MICROgram(s) HFA Inhaler 2 Puff(s) Inhalation two times a day    acetaminophen     Tablet .. 650 milliGRAM(s) Oral every 6 hours PRN  LORazepam     Tablet 1 milliGRAM(s) Oral every 4 hours  LORazepam   Injectable 2 milliGRAM(s) IV Push every 6 hours PRN      heparin   Injectable 5000 Unit(s) SubCutaneous every 12 hours    pantoprazole  Injectable 40 milliGRAM(s) IV Push daily  polyethylene glycol 3350 17 Gram(s) Oral every 12 hours  senna 3 Tablet(s) Oral at bedtime  simethicone 80 milliGRAM(s) Chew every 6 hours  sucralfate suspension 1 Gram(s) Oral four times a day      dextrose 50% Injectable 25 Gram(s) IV Push once  dextrose 50% Injectable 12.5 Gram(s) IV Push once  dextrose 50% Injectable 25 Gram(s) IV Push once  dextrose Oral Gel 15 Gram(s) Oral once PRN  glucagon  Injectable 1 milliGRAM(s) IntraMuscular once  insulin glargine Injectable (LANTUS) 8 Unit(s) SubCutaneous every morning  insulin lispro (ADMELOG) corrective regimen sliding scale   SubCutaneous every 6 hours    dextrose 5%. 1000 milliLiter(s) IV Continuous <Continuous>  dextrose 5%. 1000 milliLiter(s) IV Continuous <Continuous>  ferrous    sulfate 325 milliGRAM(s) Oral daily  lactated ringers. 1000 milliLiter(s) IV Continuous <Continuous>  multivitamin 1 Tablet(s) Oral daily      bacitracin   Ointment 1 Application(s) Topical daily  chlorhexidine 0.12% Liquid 15 milliLiter(s) Oral Mucosa every 12 hours  chlorhexidine 2% Cloths 1 Application(s) Topical <User Schedule>  Dakins Solution - Full Strength 1 Application(s) Topical two times a day  mineral oil/petrolatum Hydrophilic Ointment 1 Application(s) Topical daily  nystatin Powder 1 Application(s) Topical three times a day  povidone iodine 10% Solution 1 Application(s) Topical three times a day    lactobacillus acidophilus 2 Tablet(s) Oral daily      Mode: AC/ CMV (Assist Control/ Continuous Mandatory Ventilation)  RR (machine): 18  TV (machine): 400  FiO2: 40  PEEP: 5  ITime: 1  MAP: 11  PIP: 25      ICU Vital Signs Last 24 Hrs  T(C): 37 (2022 19:32), Max: 37.7 (2022 23:00)  T(F): 98.6 (2022 19:32), Max: 99.8 (2022 23:00)  HR: 73 (2022 17:00) (54 - 130)  BP: 123/53 (2022 17:00) (83/42 - 178/83)  BP(mean): 71 (2022 17:00) (55 - 112)  ABP: --  ABP(mean): --  RR: 19 (2022 17:00) (13 - 44)  SpO2: 93% (2022 17:00) (92% - 99%)    O2 Parameters below as of 2022 17:00      O2 Concentration (%): 40            I&O's Detail    2022 07:01  -  2022 07:00  --------------------------------------------------------  IN:    Enteral Tube Flush: 240 mL    IV PiggyBack: 50 mL    Jevity 1.5: 300 mL  Total IN: 590 mL    OUT:    Voided (mL): 650 mL  Total OUT: 650 mL    Total NET: -60 mL      2022 07:01  -  2022 19:38  --------------------------------------------------------  IN:    IV PiggyBack: 50 mL    Lactated Ringers: 550 mL  Total IN: 600 mL    OUT:    Indwelling Catheter - Urethral (mL): 200 mL    Stool (mL): 1 mL  Total OUT: 201 mL    Total NET: 399 mL            LABS:                        8.0    9.05  )-----------( 175      ( 2022 12:33 )             26.3         139  |  103  |  24<H>  ----------------------------<  124<H>  4.3   |  30  |  1.27    Ca    8.4      2022 06:48  Phos  2.6         TPro  6.6  /  Alb  1.8<L>  /  TBili  0.3  /  DBili  x   /  AST  28  /  ALT  13  /  AlkPhos  106            CAPILLARY BLOOD GLUCOSE      POCT Blood Glucose.: 107 mg/dL (2022 17:42)    PT/INR - ( 2022 06:48 )   PT: 15.9 sec;   INR: 1.38 ratio         PTT - ( 2022 06:53 )  PTT:45.6 sec  Urinalysis Basic - ( 2022 06:53 )    Color: Yellow / Appearance: Clear / S.020 / pH: x  Gluc: x / Ketone: Negative  / Bili: Negative / Urobili: Negative mg/dL   Blood: x / Protein: 100 mg/dL / Nitrite: Negative   Leuk Esterase: Trace / RBC: 0-2 /HPF / WBC 6-10   Sq Epi: x / Non Sq Epi: Few / Bacteria: Few      CULTURES:  Rapid RVP Result: NotDetec (22 @ 06:53)  Culture Results:   No growth to date. (22 @ 06:53)  Culture Results:   No growth to date. (22 @ 06:53)  Culture Results:   Numerous Acinetobacter baumannii (Carbapenem Resistant) /nosocomialis  group  Few Candida tropicalis "Susceptibilities not performed"  Few Methicillin Resistant Staphylococcus aureus (22 @ 23:56)      Physical Examination:    General: Pt laying in hospital bed in no acute distress.  Alert, responsive to verbal stimuli.     HEENT: Pupils equal, reactive to light.  Symmetric. Trach in place.     PULM: Clear to auscultation bilaterally, no significant sputum production    CVS: Regular rate and rhythm, no murmurs, rubs, or gallops    ABD: Soft, nondistended, nontender, normoactive bowel sounds, no masses    EXT: No edema, nontender    SKIN: Warm and well perfused.       RADIOLOGY: < from: US Duplex Venous Lower Ext Complete, Bilateral (22 @ 21:36) >  ACC: [id number] EXAM:  US DPLX LWR EXT VEINS COMPL BI                          PROCEDURE DATE:  2022          INTERPRETATION:  CLINICAL INFORMATION: Bilateral leg swelling    COMPARISON: Venous Doppler 2022    TECHNIQUE: Duplex sonography of the BILATERAL LOWER extremity veins with   color and spectral Doppler, with and without compression.    FINDINGS:    RIGHT:  Normal compressibility of the RIGHT common femoral, femoral and popliteal   veins.  Doppler examination shows normal spontaneous and phasic flow.  No RIGHT calf vein thrombosis is detected.    LEFT:  Normal compressibility of the LEFT common femoral, femoral and popliteal   veins.  Doppler examination shows normal spontaneous and phasic flow.  No LEFT calf vein thrombosisis detected.    IMPRESSION:  No evidence of deep venous thrombosis in either lower extremity.    --- End of Report ---            HARLEY WILCOX MD; Attending Radiologist  This document has been electronically signed. 2022 11:16PM      CRITICAL CARE TIME SPENT: 45 minutes assessing presenting problems of acute illness, which pose high probability of life threatening deterioration or end organ damage/dysfunction, as well as medical decision making including initiating plan of care, reviewing data, reviewing radiologic exams, discussing with multidisciplinary team,  discussing goals of care with patient/family, and writing this note.  Non-inclusive of procedures performed,   
SUBJECTIVE:    No acute events overnight, afebrile, hds.    REVIEW OF SYSTEMS:    unable to elicit, vented    VITAL SIGNS:    Vital Signs Last 24 Hrs  T(C): 37 (13 Nov 2022 07:38), Max: 37.7 (12 Nov 2022 23:00)  T(F): 98.6 (13 Nov 2022 07:38), Max: 99.8 (12 Nov 2022 23:00)  HR: 60 (13 Nov 2022 11:20) (54 - 130)  BP: 99/48 (13 Nov 2022 10:00) (83/42 - 178/83)  BP(mean): 64 (13 Nov 2022 10:00) (55 - 112)  RR: 18 (13 Nov 2022 10:00) (11 - 44)  SpO2: 99% (13 Nov 2022 11:20) (92% - 100%)    Parameters below as of 13 Nov 2022 11:15  Patient On (Oxygen Delivery Method): ventilator,40        PHYSICAL EXAM:     GENERAL: vented  HEENT:  neck supple, MMM  RESPIRATORY: LCTAB/L, no rhonchi, rales, or wheezing  CARDIOVASCULAR: RRR, no murmurs, gallops, rubs  ABDOMINAL: soft, non-tender, non-distended, positive bowel sounds   EXTREMITIES: no clubbing, cyanosis, or edema; some discoloration in feet/toes, chronic arterial insuff  NEUROLOGICAL: vented, opens eyes  SKIN: discoloration in toes  MUSCULOSKELETAL: no gross joint deformity                            7.8    7.16  )-----------( 174      ( 13 Nov 2022 06:48 )             25.5     11-13    139  |  103  |  24<H>  ----------------------------<  124<H>  4.3   |  30  |  1.27    Ca    8.4      13 Nov 2022 06:48  Phos  2.6     11-13    TPro  6.6  /  Alb  1.8<L>  /  TBili  0.3  /  DBili  x   /  AST  28  /  ALT  13  /  AlkPhos  106  11-13      CAPILLARY BLOOD GLUCOSE      POCT Blood Glucose.: 136 mg/dL (13 Nov 2022 05:53)  POCT Blood Glucose.: 111 mg/dL (12 Nov 2022 23:07)  POCT Blood Glucose.: 111 mg/dL (12 Nov 2022 18:34)  POCT Blood Glucose.: 105 mg/dL (12 Nov 2022 13:07)      MEDICATIONS  (STANDING):  albuterol    90 MICROgram(s) HFA Inhaler 2 Puff(s) Inhalation two times a day  bacitracin   Ointment 1 Application(s) Topical daily  chlorhexidine 0.12% Liquid 15 milliLiter(s) Oral Mucosa every 12 hours  Dakins Solution - Full Strength 1 Application(s) Topical two times a day  dextrose 5%. 1000 milliLiter(s) (100 mL/Hr) IV Continuous <Continuous>  dextrose 5%. 1000 milliLiter(s) (50 mL/Hr) IV Continuous <Continuous>  dextrose 50% Injectable 25 Gram(s) IV Push once  dextrose 50% Injectable 12.5 Gram(s) IV Push once  dextrose 50% Injectable 25 Gram(s) IV Push once  ferrous    sulfate 325 milliGRAM(s) Oral daily  glucagon  Injectable 1 milliGRAM(s) IntraMuscular once  heparin   Injectable 5000 Unit(s) SubCutaneous every 12 hours  insulin glargine Injectable (LANTUS) 8 Unit(s) SubCutaneous every morning  insulin lispro (ADMELOG) corrective regimen sliding scale   SubCutaneous every 6 hours  lactated ringers. 1000 milliLiter(s) (50 mL/Hr) IV Continuous <Continuous>  lactobacillus acidophilus 2 Tablet(s) Oral daily  LORazepam     Tablet 1 milliGRAM(s) Oral every 4 hours  meropenem  IVPB      meropenem  IVPB 1000 milliGRAM(s) IV Intermittent every 12 hours  midodrine 10 milliGRAM(s) Oral every 8 hours  mineral oil/petrolatum Hydrophilic Ointment 1 Application(s) Topical daily  multivitamin 1 Tablet(s) Oral daily  nystatin Powder 1 Application(s) Topical three times a day  pantoprazole  Injectable 40 milliGRAM(s) IV Push daily  polyethylene glycol 3350 17 Gram(s) Oral every 12 hours  povidone iodine 10% Solution 1 Application(s) Topical three times a day  senna 3 Tablet(s) Oral at bedtime  simethicone 80 milliGRAM(s) Chew every 6 hours  sucralfate suspension 1 Gram(s) Oral four times a day  trimethoprim   80 mG/sulfamethoxazole 400 mG 1 Tablet(s) Oral two times a day      
SUBJECTIVE:    No acute events overnight, afebrile, hds.    REVIEW OF SYSTEMS:    Unable to elicit, pt vented    VITAL SIGNS:    Vital Signs Last 24 Hrs  T(C): 36.7 (14 Nov 2022 05:50), Max: 37.9 (14 Nov 2022 00:27)  T(F): 98 (14 Nov 2022 05:50), Max: 100.2 (14 Nov 2022 00:27)  HR: 48 (14 Nov 2022 07:32) (48 - 97)  BP: 127/57 (14 Nov 2022 07:00) (88/43 - 165/71)  BP(mean): 78 (14 Nov 2022 07:00) (58 - 98)  RR: 18 (14 Nov 2022 07:00) (12 - 34)  SpO2: 99% (14 Nov 2022 07:32) (93% - 100%)    Parameters below as of 14 Nov 2022 07:31  Patient On (Oxygen Delivery Method): ventilator,40        PHYSICAL EXAM:     GENERAL: vented  HEENT: neck supple, MMM  RESPIRATORY: coarse bs b/l  CARDIOVASCULAR: RRR, no murmurs, gallops, rubs  ABDOMINAL: soft, non-tender, non-distended, positive bowel sounds   EXTREMITIES: no clubbing, cyanosis, or edema  NEUROLOGICAL:  vented  SKIN: no new rashes or lesions, discoloration in le  MUSCULOSKELETAL: no gross joint deformity                          7.7    9.41  )-----------( 155      ( 14 Nov 2022 05:30 )             25.2     11-14    139  |  103  |  21  ----------------------------<  119<H>  4.2   |  30  |  1.31<H>    Ca    7.8<L>      14 Nov 2022 05:30  Phos  2.6     11-13    TPro  6.4  /  Alb  1.7<L>  /  TBili  0.6  /  DBili  x   /  AST  21  /  ALT  <10<L>  /  AlkPhos  93  11-14      CAPILLARY BLOOD GLUCOSE      POCT Blood Glucose.: 112 mg/dL (14 Nov 2022 07:49)  POCT Blood Glucose.: 128 mg/dL (14 Nov 2022 04:54)  POCT Blood Glucose.: 110 mg/dL (13 Nov 2022 23:31)  POCT Blood Glucose.: 107 mg/dL (13 Nov 2022 17:42)  POCT Blood Glucose.: 101 mg/dL (13 Nov 2022 11:54)      MEDICATIONS  (STANDING):  albuterol    90 MICROgram(s) HFA Inhaler 2 Puff(s) Inhalation two times a day  bacitracin   Ointment 1 Application(s) Topical daily  chlorhexidine 0.12% Liquid 15 milliLiter(s) Oral Mucosa every 12 hours  chlorhexidine 2% Cloths 1 Application(s) Topical <User Schedule>  Dakins Solution - Full Strength 1 Application(s) Topical two times a day  dextrose 5%. 1000 milliLiter(s) (50 mL/Hr) IV Continuous <Continuous>  dextrose 5%. 1000 milliLiter(s) (100 mL/Hr) IV Continuous <Continuous>  dextrose 50% Injectable 25 Gram(s) IV Push once  dextrose 50% Injectable 12.5 Gram(s) IV Push once  dextrose 50% Injectable 25 Gram(s) IV Push once  ferrous    sulfate 325 milliGRAM(s) Oral daily  glucagon  Injectable 1 milliGRAM(s) IntraMuscular once  heparin   Injectable 5000 Unit(s) SubCutaneous every 12 hours  insulin glargine Injectable (LANTUS) 8 Unit(s) SubCutaneous every morning  insulin lispro (ADMELOG) corrective regimen sliding scale   SubCutaneous every 6 hours  lactated ringers. 1000 milliLiter(s) (50 mL/Hr) IV Continuous <Continuous>  lactobacillus acidophilus 2 Tablet(s) Oral daily  LORazepam     Tablet 1 milliGRAM(s) Oral every 4 hours  meropenem  IVPB      meropenem  IVPB 1000 milliGRAM(s) IV Intermittent every 12 hours  midodrine 10 milliGRAM(s) Oral every 8 hours  mineral oil/petrolatum Hydrophilic Ointment 1 Application(s) Topical daily  multivitamin 1 Tablet(s) Oral daily  nystatin Powder 1 Application(s) Topical three times a day  pantoprazole  Injectable 40 milliGRAM(s) IV Push daily  polyethylene glycol 3350 17 Gram(s) Oral every 12 hours  povidone iodine 10% Solution 1 Application(s) Topical three times a day  senna 3 Tablet(s) Oral at bedtime  simethicone 80 milliGRAM(s) Chew every 6 hours  sucralfate suspension 1 Gram(s) Oral four times a day  trimethoprim   80 mG/sulfamethoxazole 400 mG 1 Tablet(s) Oral two times a day      
SUBJECTIVE:    No acute events overnight, afebrile, hds.    REVIEW OF SYSTEMS:    CONSTITUTIONAL: No weakness, fevers or chills  EYES/ENT: No visual changes, no throat pain   RESPIRATORY: No cough, wheezing, hemoptysis; No shortness of breath  CARDIOVASCULAR: No chest pain or palpitations  GASTROINTESTINAL: No abdominal, nausea, vomiting, or hematemesis; No diarrhea or constipation. No melena or hematochezia.  GENITOURINARY: No dysuria, frequency or hematuria  NEUROLOGICAL: No dizziness, numbness, or weakness  SKIN: No itching, burning, rashes, or lesions   All other review of systems is negative unless indicated above.    VITAL SIGNS:    Vital Signs Last 24 Hrs  T(C): 36.6 (15 Nov 2022 08:35), Max: 36.6 (14 Nov 2022 12:30)  T(F): 97.9 (15 Nov 2022 08:35), Max: 97.9 (15 Nov 2022 08:35)  HR: 50 (15 Nov 2022 08:00) (46 - 67)  BP: 116/65 (15 Nov 2022 08:00) (98/56 - 149/79)  BP(mean): 80 (15 Nov 2022 08:00) (70 - 101)  RR: 16 (15 Nov 2022 08:00) (14 - 26)  SpO2: 100% (15 Nov 2022 08:00) (94% - 100%)    Parameters below as of 15 Nov 2022 08:00  Patient On (Oxygen Delivery Method): ventilator    O2 Concentration (%): 40    PHYSICAL EXAM:     GENERAL: vented  HEENT: neck supple, MMM  RESPIRATORY: coarse b/s b/l  CARDIOVASCULAR: RRR, no murmurs, gallops, rubs  ABDOMINAL: soft, non-tender, non-distended, positive bowel sounds   EXTREMITIES: no clubbing, cyanosis, or edema  NEUROLOGICAL: vented, opens eyes  SKIN: no new rashes or lesions   MUSCULOSKELETAL: no gross joint deformity                          8.6    6.32  )-----------( 163      ( 15 Nov 2022 06:31 )             28.0     11-15    137  |  103  |  18  ----------------------------<  93  3.8   |  29  |  1.04    Ca    7.9<L>      15 Nov 2022 06:31    TPro  6.4  /  Alb  1.7<L>  /  TBili  0.6  /  DBili  x   /  AST  21  /  ALT  <10<L>  /  AlkPhos  93  11-14      CAPILLARY BLOOD GLUCOSE      POCT Blood Glucose.: 92 mg/dL (15 Nov 2022 05:02)  POCT Blood Glucose.: 83 mg/dL (14 Nov 2022 23:05)  POCT Blood Glucose.: 80 mg/dL (14 Nov 2022 17:32)  POCT Blood Glucose.: 99 mg/dL (14 Nov 2022 11:21)      MEDICATIONS  (STANDING):  albuterol    90 MICROgram(s) HFA Inhaler 2 Puff(s) Inhalation two times a day  aluminum hydroxide/magnesium hydroxide/simethicone Suspension 30 milliLiter(s) Oral every 8 hours  bacitracin   Ointment 1 Application(s) Topical daily  chlorhexidine 0.12% Liquid 15 milliLiter(s) Oral Mucosa every 12 hours  chlorhexidine 2% Cloths 1 Application(s) Topical <User Schedule>  Dakins Solution - Full Strength 1 Application(s) Topical two times a day  dextrose 5%. 1000 milliLiter(s) (50 mL/Hr) IV Continuous <Continuous>  dextrose 5%. 1000 milliLiter(s) (100 mL/Hr) IV Continuous <Continuous>  dextrose 50% Injectable 25 Gram(s) IV Push once  dextrose 50% Injectable 12.5 Gram(s) IV Push once  dextrose 50% Injectable 25 Gram(s) IV Push once  ferrous    sulfate 325 milliGRAM(s) Oral daily  glucagon  Injectable 1 milliGRAM(s) IntraMuscular once  heparin   Injectable 5000 Unit(s) SubCutaneous every 12 hours  insulin glargine Injectable (LANTUS) 8 Unit(s) SubCutaneous every morning  insulin lispro (ADMELOG) corrective regimen sliding scale   SubCutaneous every 6 hours  lactated ringers. 1000 milliLiter(s) (50 mL/Hr) IV Continuous <Continuous>  lactobacillus acidophilus 2 Tablet(s) Oral daily  LORazepam     Tablet 1 milliGRAM(s) Oral every 4 hours  midodrine 10 milliGRAM(s) Oral every 8 hours  mineral oil/petrolatum Hydrophilic Ointment 1 Application(s) Topical daily  multivitamin 1 Tablet(s) Oral daily  nystatin Powder 1 Application(s) Topical three times a day  pantoprazole  Injectable 40 milliGRAM(s) IV Push daily  polyethylene glycol 3350 17 Gram(s) Oral every 12 hours  povidone iodine 10% Solution 1 Application(s) Topical three times a day  senna 3 Tablet(s) Oral at bedtime  simethicone 80 milliGRAM(s) Chew every 6 hours  sucralfate suspension 1 Gram(s) Oral four times a day  vancomycin  IVPB      vancomycin  IVPB 750 milliGRAM(s) IV Intermittent every 24 hours

## 2022-11-16 NOTE — DISCHARGE NOTE PROVIDER - NSDCMRMEDTOKEN_GEN_ALL_CORE_FT
albuterol 90 mcg/inh inhalation aerosol with adapter: 2  inhaled every 6 hours  Bacid (LAC) oral tablet: 2 tab(s) by gastrostomy tube once a day  bacitracin 500 units/g topical ointment: Apply topically to affected area once a day to knees  chlorhexidine 0.12% mucous membrane liquid: 15 milliliter(s) mucous membrane 2 times a day  Dakins Full Strength 0.5% topical solution: Apply topically to affected area 2 times a day then apply santyl and calcium alginate  Eucerin topical cream: Apply topically to affected area once a day bilateral feet  ferrous sulfate 325 mg (65 mg elemental iron) oral tablet: 1 tab(s) by gastrostomy tube once a day  insulin glargine 100 units/mL subcutaneous solution: 8 unit(s) subcutaneous once a day (in the morning)  ipratropium-albuterol 0.5 mg-2.5 mg/3 mL inhalation solution: 3 milliliter(s) inhaled every 6 hours  LORazepam 1 mg oral tablet: 1 tab(s) by gastrostomy tube every 4 hours  midodrine 10 mg oral tablet: 1 tab(s) by gastrostomy tube every 8 hours  mulivitamin: by gastrostomy tube once a day  nystatin 100,000 units/g topical powder: 1 application topically 3 times a day  omeprazole 40 mg oral delayed release capsule: 1 cap(s) by gastrostomy tube 2 times a day  polyethylene glycol 3350 oral powder for reconstitution: 17 gram(s) by gastrostomy tube every 12 hours  senna 8.6 mg oral tablet: 3 tab(s) by gastrostomy tube once a day (at bedtime)  simethicone 80 mg oral tablet, chewable: 1 tab(s) by gastrostomy tube every 6 hours  sucralfate 1 g/10 mL oral suspension: 10 milliliter(s) g-tube 4 times a day (before meals and at bedtime)  Tylenol 325 mg oral tablet: 2 tab(s) orally every 6 hours, As Needed prior to dressing change

## 2022-11-16 NOTE — DISCHARGE NOTE NURSING/CASE MANAGEMENT/SOCIAL WORK - NSDCVIVACCINE_GEN_ALL_CORE_FT
COVID-19, mRNA, LNP-S, PF, 30 mcg/0.3 mL dose (Pfizer); 20-Dec-2021 16:02; Cat Ramirez); Pf[employer]; [id number] (Exp. Date: 30-Dec-2021); IntraMuscular; Deltoid Left.; 0.3 milliLiter(s);

## 2022-11-16 NOTE — PROGRESS NOTE ADULT - ASSESSMENT
REVIEW OF SYMPTOMS      Able to give (reliable) ROS  NO     PHYSICAL EXAM    HEENT Unremarkable  atraumatic   RESP Fair air entry EXP prolonged    Harsh breath sound Resp distres mild   CARDIAC S1 S2 No S3     NO JVD    ABDOMEN SOFT BS PRESENT NOT DISTENDED No hepatosplenomegaly   PEDAL EDEMA present No calf tenderness  NO rash       GENERAL DATA .   GOC.  11/12/2022 full code        ALLGY. codeine                            WT.  11/12/2022 57  BMI.   11/12/2022 20                           ICU STAY. 11/12/2022  COVID. 11/12/2022 scv2 (-)      PROCEDURES.  11/12/2022 IO in ER  11/12 reid io monitor     BEST PRACTICE ISSUES.    HOB ELEVATN. Yes  DVT PPLX.  11/12/2022 hosc     SQUIRES PPLX.  11/12/2022 protonix 40     11/12/2022 carafate   INFN PPLX.  11/12/2022 chlorhexidine .12%   chlorhexidine 2% 11/12   SP SW EM.        DIET.  11/14 glucerna tf started by hospitalist    VS/ PO/IO/ VENT/ DRIPS.  11/16/2022 afeb 90 130/70   11/16/2022 ac 18/400/5/.4      OVERALL ASSESSMENT/DISPOSITION.  76 f PMH trach peg cva cac anoxic encephalo PH 8/19/2022 pasp 58 bl pl effs  r thora 6/8/2022 and l thora 5/25/2022 were lp exudates  recurrent hospitalizations HO CR psuedomonas 5/2/2022 zosyn 8/19/2022  recent admission 10/18-11/2/2022 uc 10/18 100K E coli  sp 10/19 Stenotrophomonas  10/19-10/26/2022 levaquin 750  10/21/2022 rocephin x 7d Dr BRITTANY Brantley readmitted recently  11/4-11/10/2022 with fever trach 11/5 stenotrophomonas   uc 11/4 vr enterococc 50-99 candida   11/4/2022 levaquin 750 dced 11/7 doxy  11/8 bactrim 250.3    Pt sent [facility] 11/12/2022 with resp distress /70 p 119 r 36 T 100 po 93 on 100% GCS 10 leukocytosis w 15   Pulm crit care consulted      PROBLEMS.  Vent dependent   Trach poa 11/12/2022    VAP stenotrophomonas 11/12/2022  Decub ulcers   Meropenem 11/12 7d  Bactrim bid 11/12      COPD    Low BP 11/12/2022     peg poa 11/12/2022   GT leak 11/13/2022  -> GI calld     ANEMIA Hb 11/12-11/13/2022 Hb 10.3 - 7.8     AGITATION 11/12       ASSESSMENT/RECOMMENDATIONS .   RESP.  .. Gas exchange.  Monitor & target po 90-95%  11/12/2022 check abg   .. RO VTE.  V duplx legs 11/4 (-)   11/12/2022  venous duplx (-)   .. COPD.  11/12/2022 albuterol 2px2   11/12/2022 duoneb.4   cont rx  INFECTION.  .. SCV2 status.  Scv2 11/12/2022 scv2 (-)   .. VAP   w 11/12-11/13-11/14-11/16/2022 w 15.4 - 7.1 - 9.4- 6.5   ua 11/12/2022 w 6-10   cxr 11/12/2022 diffuse infiltr nsc 11/8   rvp 11/12/2022 (-)   trach 11/13 stenotrophomonas   bc 11/12 (-)   uc 11/12 (-)   mrsa 11/13 (-)   11/12 meropenem 1.2 x 7d   11/12 bactrim bid   CARDIAC.  .. Hemodynamics.   .. low bp.  la 11/12/2022 la 1.8   11/12/2022 midodrine 10.3   target map 65 (+)   .. ro CHF  bnp 11/13/2022 bnp [id number]  echo 8/19/2022 dd1 pasp 58 n rvsf   optimize volume state   .. RO MI.   ekg 11/4/2022 irbb septal infarct age undetermined septal infarct not seen 10/18/2022   11/12/2022 check ekg trop   GI.  .. Nutrition.  11/13/2022 made npo as gt leak  11/14 glucerna tf started by hospitalist  .. elevated lfts.  LFTS 11/12/2022   ap 145  ast 18  alt 15   .. constipat.  11/12/2022 miralax   11/12/2022 senna   .. GT LEAK 11/13/2022 11/13/2022 GI consulted   11/13/2022 made npo  11/14/2022 awaiting part (per gi)   11/14 glucerna tf started by hospitalist  HEMAT.  .. DVT pplx.   11/12/2022 hpsc  .. anemia.  Hb 11/12-11/13-11/14-11/15-11/16/2022 Hb 10.3 - 7.8- 7.7 - 8.6  - 8.5   mcv 11/12/2022 mcv 90   inr 11/12/2022 inr 124   monitor   RENAL.  .. renal parameters.  Na 11/12-11/13/2022 Na 138- 139  K 11/12/2022 K 5.3   co2 11/12/2022 co2 30   Cr 11/12-11/13-11/15/2022 Cr 1.1 - 1.3 - 1   monitor  .. REID  11/12 for io monitoring  IV fl.  11/13/2022 lr 50   SKIN  .. Skin break down.  11/12/2022 Dakins   ENDOCRINE.   .. DM.  11/12/2022 Insulin 8 u q a   11/12/2022 riss   NEURO.  .. AGITATION.  11/12 lorazepam 2.4p     TIME SPENT   Over 39 minutes aggregate critical care time spent on encounter; activities included   direct patient care, counseling and/or coordinating care reviewing notes, lab data/ imaging , discussion with multidisciplinary team/ patient  /family and explaining in detail risks, benefits, alternatives  of the recommendations     CHAPINCITO GUIDRY 76 f [address] S 11/12/2022   DR JASMEET HEARD

## 2022-11-16 NOTE — PROGRESS NOTE ADULT - REASON FOR ADMISSION
sepsis, leukocytosis, midline adjustment
sepsis, leukocytosis
sepsis, leukocytosis

## 2022-11-17 NOTE — CDI QUERY NOTE - NSCDI_DOCCLARIFY_GEN_ALL_CORE_FT
To ER if acutely worse,f/u Dr Hamzah Pink  As needed           In responding to this request, please exercise your independent professional judgment.  The fact that a question is asked does not imply that any particular answer is desired or expected. English

## 2022-11-21 ENCOUNTER — INPATIENT (INPATIENT)
Facility: HOSPITAL | Age: 77
LOS: 16 days | DRG: 870 | End: 2022-12-08
Attending: HOSPITALIST | Admitting: HOSPITALIST
Payer: MEDICARE

## 2022-11-21 VITALS
RESPIRATION RATE: 22 BRPM | TEMPERATURE: 101 F | OXYGEN SATURATION: 97 % | WEIGHT: 125.88 LBS | SYSTOLIC BLOOD PRESSURE: 129 MMHG | HEART RATE: 112 BPM | HEIGHT: 65 IN | DIASTOLIC BLOOD PRESSURE: 79 MMHG

## 2022-11-21 DIAGNOSIS — Z93.0 TRACHEOSTOMY STATUS: Chronic | ICD-10-CM

## 2022-11-21 DIAGNOSIS — Z93.1 GASTROSTOMY STATUS: Chronic | ICD-10-CM

## 2022-11-21 DIAGNOSIS — J18.9 PNEUMONIA, UNSPECIFIED ORGANISM: ICD-10-CM

## 2022-11-21 LAB
ALBUMIN SERPL ELPH-MCNC: 2.6 G/DL — LOW (ref 3.3–5)
ALP SERPL-CCNC: 128 U/L — HIGH (ref 30–120)
ALT FLD-CCNC: 20 U/L DA — SIGNIFICANT CHANGE UP (ref 10–60)
ANION GAP SERPL CALC-SCNC: 11 MMOL/L — SIGNIFICANT CHANGE UP (ref 5–17)
APPEARANCE UR: CLEAR — SIGNIFICANT CHANGE UP
APTT BLD: 40 SEC — HIGH (ref 27.5–35.5)
AST SERPL-CCNC: 41 U/L — HIGH (ref 10–40)
BACTERIA # UR AUTO: ABNORMAL
BASE EXCESS BLDA CALC-SCNC: 12.8 MMOL/L — HIGH (ref -2–3)
BASOPHILS # BLD AUTO: 0.04 K/UL — SIGNIFICANT CHANGE UP (ref 0–0.2)
BASOPHILS NFR BLD AUTO: 0.2 % — SIGNIFICANT CHANGE UP (ref 0–2)
BILIRUB SERPL-MCNC: 0.6 MG/DL — SIGNIFICANT CHANGE UP (ref 0.2–1.2)
BILIRUB UR-MCNC: NEGATIVE — SIGNIFICANT CHANGE UP
BLOOD GAS COMMENTS ARTERIAL: SIGNIFICANT CHANGE UP
BUN SERPL-MCNC: 35 MG/DL — HIGH (ref 7–23)
CALCIUM SERPL-MCNC: 9.4 MG/DL — SIGNIFICANT CHANGE UP (ref 8.4–10.5)
CHLORIDE SERPL-SCNC: 101 MMOL/L — SIGNIFICANT CHANGE UP (ref 96–108)
CO2 SERPL-SCNC: 31 MMOL/L — SIGNIFICANT CHANGE UP (ref 22–31)
COLOR SPEC: YELLOW — SIGNIFICANT CHANGE UP
CREAT SERPL-MCNC: 1.25 MG/DL — SIGNIFICANT CHANGE UP (ref 0.5–1.3)
DIFF PNL FLD: ABNORMAL
EGFR: 44 ML/MIN/1.73M2 — LOW
EOSINOPHIL # BLD AUTO: 0.01 K/UL — SIGNIFICANT CHANGE UP (ref 0–0.5)
EOSINOPHIL NFR BLD AUTO: 0.1 % — SIGNIFICANT CHANGE UP (ref 0–6)
EPI CELLS # UR: ABNORMAL
FLUAV AG NPH QL: SIGNIFICANT CHANGE UP
FLUBV AG NPH QL: SIGNIFICANT CHANGE UP
GAS PNL BLDA: SIGNIFICANT CHANGE UP
GLUCOSE SERPL-MCNC: 124 MG/DL — HIGH (ref 70–99)
GLUCOSE UR QL: NEGATIVE MG/DL — SIGNIFICANT CHANGE UP
HCO3 BLDA-SCNC: 34 MMOL/L — HIGH (ref 21–28)
HCT VFR BLD CALC: 31.7 % — LOW (ref 34.5–45)
HGB BLD-MCNC: 9.6 G/DL — LOW (ref 11.5–15.5)
HOROWITZ INDEX BLDA+IHG-RTO: 100 — SIGNIFICANT CHANGE UP
IMM GRANULOCYTES NFR BLD AUTO: 0.4 % — SIGNIFICANT CHANGE UP (ref 0–0.9)
INR BLD: 1.22 RATIO — HIGH (ref 0.88–1.16)
KETONES UR-MCNC: ABNORMAL
LACTATE SERPL-SCNC: 1.9 MMOL/L — SIGNIFICANT CHANGE UP (ref 0.7–2)
LEGIONELLA AG UR QL: NEGATIVE — SIGNIFICANT CHANGE UP
LEUKOCYTE ESTERASE UR-ACNC: ABNORMAL
LYMPHOCYTES # BLD AUTO: 0.67 K/UL — LOW (ref 1–3.3)
LYMPHOCYTES # BLD AUTO: 4.2 % — LOW (ref 13–44)
MCHC RBC-ENTMCNC: 27.5 PG — SIGNIFICANT CHANGE UP (ref 27–34)
MCHC RBC-ENTMCNC: 30.3 GM/DL — LOW (ref 32–36)
MCV RBC AUTO: 90.8 FL — SIGNIFICANT CHANGE UP (ref 80–100)
MONOCYTES # BLD AUTO: 0.98 K/UL — HIGH (ref 0–0.9)
MONOCYTES NFR BLD AUTO: 6.1 % — SIGNIFICANT CHANGE UP (ref 2–14)
MRSA PCR RESULT.: SIGNIFICANT CHANGE UP
NEUTROPHILS # BLD AUTO: 14.25 K/UL — HIGH (ref 1.8–7.4)
NEUTROPHILS NFR BLD AUTO: 89 % — HIGH (ref 43–77)
NITRITE UR-MCNC: NEGATIVE — SIGNIFICANT CHANGE UP
NRBC # BLD: 0 /100 WBCS — SIGNIFICANT CHANGE UP (ref 0–0)
PCO2 BLDA: 36 MMHG — HIGH (ref 32–35)
PH BLDA: 7.59 — HIGH (ref 7.35–7.45)
PH UR: 5 — SIGNIFICANT CHANGE UP (ref 5–8)
PLATELET # BLD AUTO: 290 K/UL — SIGNIFICANT CHANGE UP (ref 150–400)
PO2 BLDA: 273 MMHG — HIGH (ref 83–108)
POTASSIUM SERPL-MCNC: 5.4 MMOL/L — HIGH (ref 3.5–5.3)
POTASSIUM SERPL-SCNC: 5.4 MMOL/L — HIGH (ref 3.5–5.3)
PROCALCITONIN SERPL-MCNC: 1.88 NG/ML — HIGH (ref 0.02–0.1)
PROT SERPL-MCNC: 8.6 G/DL — HIGH (ref 6–8.3)
PROT UR-MCNC: 500 MG/DL
PROTHROM AB SERPL-ACNC: 14.4 SEC — HIGH (ref 10.5–13.4)
RAPID RVP RESULT: SIGNIFICANT CHANGE UP
RBC # BLD: 3.49 M/UL — LOW (ref 3.8–5.2)
RBC # FLD: 18 % — HIGH (ref 10.3–14.5)
RBC CASTS # UR COMP ASSIST: ABNORMAL /HPF (ref 0–4)
RSV RNA NPH QL NAA+NON-PROBE: SIGNIFICANT CHANGE UP
S AUREUS DNA NOSE QL NAA+PROBE: SIGNIFICANT CHANGE UP
S PNEUM AG UR QL: NEGATIVE — SIGNIFICANT CHANGE UP
SAO2 % BLDA: 99.8 % — HIGH (ref 94–98)
SARS-COV-2 RNA SPEC QL NAA+PROBE: SIGNIFICANT CHANGE UP
SARS-COV-2 RNA SPEC QL NAA+PROBE: SIGNIFICANT CHANGE UP
SODIUM SERPL-SCNC: 143 MMOL/L — SIGNIFICANT CHANGE UP (ref 135–145)
SP GR SPEC: 1.01 — SIGNIFICANT CHANGE UP (ref 1.01–1.02)
UROBILINOGEN FLD QL: NEGATIVE MG/DL — SIGNIFICANT CHANGE UP
WBC # BLD: 16.01 K/UL — HIGH (ref 3.8–10.5)
WBC # FLD AUTO: 16.01 K/UL — HIGH (ref 3.8–10.5)
WBC UR QL: SIGNIFICANT CHANGE UP

## 2022-11-21 PROCEDURE — 99223 1ST HOSP IP/OBS HIGH 75: CPT | Mod: AI

## 2022-11-21 PROCEDURE — 99285 EMERGENCY DEPT VISIT HI MDM: CPT | Mod: CS

## 2022-11-21 PROCEDURE — 93010 ELECTROCARDIOGRAM REPORT: CPT

## 2022-11-21 PROCEDURE — 71045 X-RAY EXAM CHEST 1 VIEW: CPT | Mod: 26,76

## 2022-11-21 RX ORDER — SODIUM CHLORIDE 9 MG/ML
1800 INJECTION INTRAMUSCULAR; INTRAVENOUS; SUBCUTANEOUS ONCE
Refills: 0 | Status: COMPLETED | OUTPATIENT
Start: 2022-11-21 | End: 2022-11-21

## 2022-11-21 RX ORDER — IPRATROPIUM/ALBUTEROL SULFATE 18-103MCG
3 AEROSOL WITH ADAPTER (GRAM) INHALATION
Qty: 0 | Refills: 0 | DISCHARGE

## 2022-11-21 RX ORDER — PIPERACILLIN AND TAZOBACTAM 4; .5 G/20ML; G/20ML
3.38 INJECTION, POWDER, LYOPHILIZED, FOR SOLUTION INTRAVENOUS ONCE
Refills: 0 | Status: COMPLETED | OUTPATIENT
Start: 2022-11-21 | End: 2022-11-21

## 2022-11-21 RX ORDER — BACITRACIN ZINC 500 UNIT/G
1 OINTMENT IN PACKET (EA) TOPICAL
Qty: 0 | Refills: 0 | DISCHARGE

## 2022-11-21 RX ORDER — VANCOMYCIN HCL 1 G
750 VIAL (EA) INTRAVENOUS EVERY 12 HOURS
Refills: 0 | Status: DISCONTINUED | OUTPATIENT
Start: 2022-11-21 | End: 2022-11-21

## 2022-11-21 RX ORDER — CHLORHEXIDINE GLUCONATE 213 G/1000ML
1 SOLUTION TOPICAL DAILY
Refills: 0 | Status: DISCONTINUED | OUTPATIENT
Start: 2022-11-21 | End: 2022-12-08

## 2022-11-21 RX ORDER — DEXTROSE 50 % IN WATER 50 %
25 SYRINGE (ML) INTRAVENOUS ONCE
Refills: 0 | Status: DISCONTINUED | OUTPATIENT
Start: 2022-11-21 | End: 2022-12-08

## 2022-11-21 RX ORDER — ALBUTEROL 90 UG/1
2 AEROSOL, METERED ORAL
Refills: 0 | Status: DISCONTINUED | OUTPATIENT
Start: 2022-11-21 | End: 2022-12-08

## 2022-11-21 RX ORDER — DEXTROSE 50 % IN WATER 50 %
15 SYRINGE (ML) INTRAVENOUS ONCE
Refills: 0 | Status: DISCONTINUED | OUTPATIENT
Start: 2022-11-21 | End: 2022-12-08

## 2022-11-21 RX ORDER — DEXTROSE 50 % IN WATER 50 %
12.5 SYRINGE (ML) INTRAVENOUS ONCE
Refills: 0 | Status: DISCONTINUED | OUTPATIENT
Start: 2022-11-21 | End: 2022-12-08

## 2022-11-21 RX ORDER — HEPARIN SODIUM 5000 [USP'U]/ML
5000 INJECTION INTRAVENOUS; SUBCUTANEOUS EVERY 12 HOURS
Refills: 0 | Status: DISCONTINUED | OUTPATIENT
Start: 2022-11-21 | End: 2022-12-08

## 2022-11-21 RX ORDER — OMEPRAZOLE 10 MG/1
1 CAPSULE, DELAYED RELEASE ORAL
Qty: 0 | Refills: 0 | DISCHARGE

## 2022-11-21 RX ORDER — SODIUM CHLORIDE 9 MG/ML
1000 INJECTION, SOLUTION INTRAVENOUS
Refills: 0 | Status: DISCONTINUED | OUTPATIENT
Start: 2022-11-21 | End: 2022-12-08

## 2022-11-21 RX ORDER — VANCOMYCIN HCL 1 G
VIAL (EA) INTRAVENOUS
Refills: 0 | Status: DISCONTINUED | OUTPATIENT
Start: 2022-11-21 | End: 2022-11-21

## 2022-11-21 RX ORDER — CHLORHEXIDINE GLUCONATE 213 G/1000ML
15 SOLUTION TOPICAL EVERY 12 HOURS
Refills: 0 | Status: DISCONTINUED | OUTPATIENT
Start: 2022-11-21 | End: 2022-11-24

## 2022-11-21 RX ORDER — LACTOBACILLUS ACIDOPHILUS 100MM CELL
2 CAPSULE ORAL
Qty: 0 | Refills: 0 | DISCHARGE

## 2022-11-21 RX ORDER — LANOLIN/MINERAL OIL
0 LOTION (ML) TOPICAL
Qty: 0 | Refills: 0 | DISCHARGE

## 2022-11-21 RX ORDER — FERROUS SULFATE 325(65) MG
1 TABLET ORAL
Qty: 0 | Refills: 0 | DISCHARGE

## 2022-11-21 RX ORDER — INSULIN LISPRO 100/ML
0 VIAL (ML) SUBCUTANEOUS
Qty: 0 | Refills: 0 | DISCHARGE

## 2022-11-21 RX ORDER — CHLORHEXIDINE GLUCONATE 213 G/1000ML
15 SOLUTION TOPICAL EVERY 12 HOURS
Refills: 0 | Status: DISCONTINUED | OUTPATIENT
Start: 2022-11-21 | End: 2022-11-21

## 2022-11-21 RX ORDER — ACETAMINOPHEN 500 MG
2 TABLET ORAL
Qty: 0 | Refills: 0 | DISCHARGE

## 2022-11-21 RX ORDER — SUCRALFATE 1 G
10 TABLET ORAL
Qty: 0 | Refills: 0 | DISCHARGE

## 2022-11-21 RX ORDER — POLYETHYLENE GLYCOL 3350 17 G/17G
17 POWDER, FOR SOLUTION ORAL EVERY 12 HOURS
Refills: 0 | Status: DISCONTINUED | OUTPATIENT
Start: 2022-11-21 | End: 2022-12-08

## 2022-11-21 RX ORDER — PIPERACILLIN AND TAZOBACTAM 4; .5 G/20ML; G/20ML
3.38 INJECTION, POWDER, LYOPHILIZED, FOR SOLUTION INTRAVENOUS ONCE
Refills: 0 | Status: DISCONTINUED | OUTPATIENT
Start: 2022-11-21 | End: 2022-11-21

## 2022-11-21 RX ORDER — GLUCAGON INJECTION, SOLUTION 0.5 MG/.1ML
1 INJECTION, SOLUTION SUBCUTANEOUS ONCE
Refills: 0 | Status: DISCONTINUED | OUTPATIENT
Start: 2022-11-21 | End: 2022-12-08

## 2022-11-21 RX ORDER — INSULIN LISPRO 100/ML
VIAL (ML) SUBCUTANEOUS
Refills: 0 | Status: DISCONTINUED | OUTPATIENT
Start: 2022-11-21 | End: 2022-11-23

## 2022-11-21 RX ORDER — SODIUM HYPOCHLORITE 0.125 %
0 SOLUTION, NON-ORAL MISCELLANEOUS
Qty: 0 | Refills: 0 | DISCHARGE

## 2022-11-21 RX ORDER — MIDODRINE HYDROCHLORIDE 2.5 MG/1
10 TABLET ORAL EVERY 8 HOURS
Refills: 0 | Status: DISCONTINUED | OUTPATIENT
Start: 2022-11-21 | End: 2022-11-22

## 2022-11-21 RX ORDER — NOREPINEPHRINE BITARTRATE/D5W 8 MG/250ML
0.05 PLASTIC BAG, INJECTION (ML) INTRAVENOUS
Qty: 8 | Refills: 0 | Status: DISCONTINUED | OUTPATIENT
Start: 2022-11-21 | End: 2022-12-05

## 2022-11-21 RX ORDER — POLYETHYLENE GLYCOL 3350 17 G/17G
0 POWDER, FOR SOLUTION ORAL
Qty: 0 | Refills: 0 | DISCHARGE

## 2022-11-21 RX ORDER — ALBUTEROL 90 UG/1
2 AEROSOL, METERED ORAL
Qty: 0 | Refills: 0 | DISCHARGE

## 2022-11-21 RX ORDER — CHLORHEXIDINE GLUCONATE 213 G/1000ML
1 SOLUTION TOPICAL
Refills: 0 | Status: DISCONTINUED | OUTPATIENT
Start: 2022-11-21 | End: 2022-11-21

## 2022-11-21 RX ORDER — MEROPENEM 1 G/30ML
1000 INJECTION INTRAVENOUS ONCE
Refills: 0 | Status: COMPLETED | OUTPATIENT
Start: 2022-11-21 | End: 2022-11-21

## 2022-11-21 RX ORDER — SODIUM CHLORIDE 9 MG/ML
10 INJECTION INTRAMUSCULAR; INTRAVENOUS; SUBCUTANEOUS
Refills: 0 | Status: DISCONTINUED | OUTPATIENT
Start: 2022-11-21 | End: 2022-12-08

## 2022-11-21 RX ORDER — ACETAMINOPHEN 500 MG
1000 TABLET ORAL ONCE
Refills: 0 | Status: COMPLETED | OUTPATIENT
Start: 2022-11-21 | End: 2022-11-21

## 2022-11-21 RX ORDER — INSULIN GLARGINE 100 [IU]/ML
8 INJECTION, SOLUTION SUBCUTANEOUS EVERY MORNING
Refills: 0 | Status: DISCONTINUED | OUTPATIENT
Start: 2022-11-21 | End: 2022-12-08

## 2022-11-21 RX ORDER — PANTOPRAZOLE SODIUM 20 MG/1
40 TABLET, DELAYED RELEASE ORAL DAILY
Refills: 0 | Status: DISCONTINUED | OUTPATIENT
Start: 2022-11-21 | End: 2022-12-08

## 2022-11-21 RX ORDER — SODIUM ZIRCONIUM CYCLOSILICATE 10 G/10G
10 POWDER, FOR SUSPENSION ORAL ONCE
Refills: 0 | Status: COMPLETED | OUTPATIENT
Start: 2022-11-21 | End: 2022-11-21

## 2022-11-21 RX ORDER — VANCOMYCIN HCL 1 G
750 VIAL (EA) INTRAVENOUS ONCE
Refills: 0 | Status: COMPLETED | OUTPATIENT
Start: 2022-11-21 | End: 2022-11-21

## 2022-11-21 RX ORDER — SENNA PLUS 8.6 MG/1
2 TABLET ORAL AT BEDTIME
Refills: 0 | Status: DISCONTINUED | OUTPATIENT
Start: 2022-11-21 | End: 2022-12-08

## 2022-11-21 RX ADMIN — PANTOPRAZOLE SODIUM 40 MILLIGRAM(S): 20 TABLET, DELAYED RELEASE ORAL at 11:29

## 2022-11-21 RX ADMIN — Medication 1000 MILLIGRAM(S): at 04:57

## 2022-11-21 RX ADMIN — MIDODRINE HYDROCHLORIDE 10 MILLIGRAM(S): 2.5 TABLET ORAL at 21:38

## 2022-11-21 RX ADMIN — SODIUM CHLORIDE 1800 MILLILITER(S): 9 INJECTION INTRAMUSCULAR; INTRAVENOUS; SUBCUTANEOUS at 04:03

## 2022-11-21 RX ADMIN — POLYETHYLENE GLYCOL 3350 17 GRAM(S): 17 POWDER, FOR SOLUTION ORAL at 05:52

## 2022-11-21 RX ADMIN — INSULIN GLARGINE 8 UNIT(S): 100 INJECTION, SOLUTION SUBCUTANEOUS at 08:59

## 2022-11-21 RX ADMIN — HEPARIN SODIUM 5000 UNIT(S): 5000 INJECTION INTRAVENOUS; SUBCUTANEOUS at 05:42

## 2022-11-21 RX ADMIN — MIDODRINE HYDROCHLORIDE 10 MILLIGRAM(S): 2.5 TABLET ORAL at 14:05

## 2022-11-21 RX ADMIN — Medication 400 MILLIGRAM(S): at 04:43

## 2022-11-21 RX ADMIN — ALBUTEROL 2 PUFF(S): 90 AEROSOL, METERED ORAL at 19:55

## 2022-11-21 RX ADMIN — SODIUM CHLORIDE 1800 MILLILITER(S): 9 INJECTION INTRAMUSCULAR; INTRAVENOUS; SUBCUTANEOUS at 00:05

## 2022-11-21 RX ADMIN — Medication 517.81 MILLIGRAM(S): at 14:05

## 2022-11-21 RX ADMIN — SENNA PLUS 2 TABLET(S): 8.6 TABLET ORAL at 21:38

## 2022-11-21 RX ADMIN — HEPARIN SODIUM 5000 UNIT(S): 5000 INJECTION INTRAVENOUS; SUBCUTANEOUS at 17:44

## 2022-11-21 RX ADMIN — MIDODRINE HYDROCHLORIDE 10 MILLIGRAM(S): 2.5 TABLET ORAL at 05:52

## 2022-11-21 RX ADMIN — Medication 517.81 MILLIGRAM(S): at 21:38

## 2022-11-21 RX ADMIN — Medication 2 MILLIGRAM(S): at 04:48

## 2022-11-21 RX ADMIN — Medication 1 MILLIGRAM(S): at 05:50

## 2022-11-21 RX ADMIN — Medication 750 MILLIGRAM(S): at 05:50

## 2022-11-21 RX ADMIN — Medication 1 MILLIGRAM(S): at 17:44

## 2022-11-21 RX ADMIN — PIPERACILLIN AND TAZOBACTAM 200 GRAM(S): 4; .5 INJECTION, POWDER, LYOPHILIZED, FOR SOLUTION INTRAVENOUS at 04:03

## 2022-11-21 RX ADMIN — Medication 1 MILLIGRAM(S): at 21:38

## 2022-11-21 RX ADMIN — PIPERACILLIN AND TAZOBACTAM 3.38 GRAM(S): 4; .5 INJECTION, POWDER, LYOPHILIZED, FOR SOLUTION INTRAVENOUS at 04:35

## 2022-11-21 RX ADMIN — Medication 2: at 06:43

## 2022-11-21 RX ADMIN — Medication 1 MILLIGRAM(S): at 14:05

## 2022-11-21 RX ADMIN — ALBUTEROL 2 PUFF(S): 90 AEROSOL, METERED ORAL at 07:08

## 2022-11-21 RX ADMIN — Medication 5.35 MICROGRAM(S)/KG/MIN: at 06:07

## 2022-11-21 RX ADMIN — MEROPENEM 100 MILLIGRAM(S): 1 INJECTION INTRAVENOUS at 06:01

## 2022-11-21 RX ADMIN — SODIUM ZIRCONIUM CYCLOSILICATE 10 GRAM(S): 10 POWDER, FOR SUSPENSION ORAL at 05:49

## 2022-11-21 RX ADMIN — POLYETHYLENE GLYCOL 3350 17 GRAM(S): 17 POWDER, FOR SOLUTION ORAL at 17:46

## 2022-11-21 RX ADMIN — Medication 1 MILLIGRAM(S): at 11:28

## 2022-11-21 RX ADMIN — CHLORHEXIDINE GLUCONATE 15 MILLILITER(S): 213 SOLUTION TOPICAL at 17:44

## 2022-11-21 RX ADMIN — Medication 250 MILLIGRAM(S): at 04:53

## 2022-11-21 RX ADMIN — Medication 1000 MILLIGRAM(S): at 04:59

## 2022-11-21 RX ADMIN — Medication 1 TABLET(S): at 09:01

## 2022-11-21 NOTE — H&P ADULT - ASSESSMENT
77F with dementia, COPD with chronic respiratory failure s/p trach, cardiac arrest, CHH, quadriplegia, CVA, CKD, anemia, PEG tube, and multiple hospital admissions for respiratory distress presents to the ED BIBEMS from [facility] for diaphoresis and fever.       Sepsis 2/2 VAP - meets sepsis based on fever + tachycardia + source of infection.  Lactate 1.9  - admitted to SPCU  - cont with meropenem  - ID consult  - cc consult  - f/u cultures   - may need pressor support to keep MAP >65  - cont with vent settings to maintain SaO2 >92%  - cont with midodrine  - on ativan for agitation  - f/u legionella and strep PNA ag  - f/u MRSA swab  - palliative care consult    DM2 on insulin  - cont with lantus and insulin coverage scale with FS q6h while on TF    COPD   - cont with neb treatments    Anemia of chronic disease   - cont with ferrous sulfate    Preventive measures  - heparin subq for DVT ppx  - FULL CODE

## 2022-11-21 NOTE — DIETITIAN INITIAL EVALUATION ADULT - REASON FOR ADMISSION
As per H&P "The pt is a 77F with dementia, COPD with chronic respiratory failure s/p trach, cardiac arrest, CHH, quadriplegia, CVA, CKD, anemia, PEG tube, and multiple hospital admissions for respiratory distress presents to the ED [facility] from [facility] for diaphoresis and fever.   Patient unable to provide any history.  Patient was just discharged here from 11/12-11/16 for low grade fever, midline malfunction, leukocytosis, fever, concerning for sepsis possibly 2/2 unresolved VAP.  Was treated with meropenem.  Per notes from [facility], around 3am, patient was does "not look good" and was noted to be diaphoretic, tachypneic, and febrile to 100.9F.  Was sent here for further evaluation.  In the ED, patient was febrile to 101.5F and tachycardic to 112.  Labs showed leukocytosis of 16 (up from 6.5), hyperkalemia 5.4, ABG 7.59/36/273.  Patient is being readmitted for sepsis 2/2 presumably from VAP."

## 2022-11-21 NOTE — DIETITIAN INITIAL EVALUATION ADULT - ORAL INTAKE PTA/DIET HISTORY
Pt visited at bedside in SPCU, with trach and vent dependent unable to provide nutrition related hx. This writer familiar with pt from recent admissions. Pt presents from [facility], per transfer documents pt NPO with TF receiving Glucerna 1.5 at goal rate of 60ml/hr over 24hrs with 250 ml free water q6hr before/after feeds + auto flush of 25ml. No food allergies documented and per H&P pt taking multivitamin and ferrous sulfate PTA.

## 2022-11-21 NOTE — DIETITIAN INITIAL EVALUATION ADULT - OTHER INFO
Weight: Upon admission pt with a wt of 125.8 lbs (11/21) consistent with previous admission wt. Will continue to monitor weights and trend as available.     Pt seen for nutrition assessment secondary to SPCU admission policy and EN PTA. Pt re-admitted for respiratory distress presents with diaphoresis and fever, presently on Levophed. Pt with hx of T2DM with HbA1c of 8.5 % (10/19) on glargine and Admelog prior to admission, presently on insulin regimen in-house.     Tube feeding order active for Glucerna 1.5 via PEG, with goal rate of 50ml/hr x 20hrs (provides 1000 ml of formula, 1500kcal, 82.5g protein, 760 ml of free water) + standard 25ml hourly flush.    Weight: Upon admission pt with a wt of 125.8 lbs (11/21) consistent with previous admission wt. Will continue to monitor weights and trend as available.     Pt seen for nutrition assessment secondary to SPCU admission policy and EN PTA. Pt re-admitted for respiratory distress presents with diaphoresis and fever, presently on Levophed. Pt with hx of T2DM with HbA1c of 8.5 % (10/19) on glargine and Admelog prior to admission, presently on insulin regimen in-house. Per RN, tube feeds to be initiated today, presently orderer for Glucerna 1.5 via PEG, with goal rate of 50ml/hr x 20hrs (provides 1000 ml of formula, 1500kcal, 82.5g protein, 760 ml of free water) + standard 25ml hourly flush. Continue with current TF order and monitor GI tolerance to feeds, RD to remain available to adjust EN formulary, volume/rate as needed/upon request.

## 2022-11-21 NOTE — CONSULT NOTE ADULT - SUBJECTIVE AND OBJECTIVE BOX
History of Present Illness: The patient is a 77 year old female with a history of HTN, DM, CVA, dementia, chronic respiratory failure s/p trach, PEG, cardiac arrest, anemia, pleural effusions who presents with fever and respiratory distress. The patient is unable to provide additional history. The patient was just discharged from the hospital after the same issue.    Past Medical/Surgical History:  HTN, DM, CVA, dementia, chronic respiratory failure s/p trach, PEG, cardiac arrest, anemia, pleural effusions     Medications:  Home Medications:  Admelog 100 units/mL injectable solution: injectable every 6 hours SS (21 Nov 2022 05:08)  albuterol 90 mcg/inh inhalation aerosol with adapter: 2  inhaled every 6 hours (21 Nov 2022 04:24)  Bacid (LAC) oral tablet: 2 tab(s) by gastrostomy tube once a day (21 Nov 2022 04:24)  bacitracin 500 units/g topical ointment: Apply topically to affected area once a day to knees (21 Nov 2022 04:24)  chlorhexidine 0.12% mucous membrane liquid: 15 milliliter(s) mucous membrane 2 times a day (21 Nov 2022 04:24)  Dakins Full Strength 0.5% topical solution: Apply topically to affected area 2 times a day then apply santyl and calcium alginate (21 Nov 2022 04:24)  Eucerin topical cream: Apply topically to affected area once a day bilateral feet (21 Nov 2022 04:24)  ferrous sulfate 325 mg (65 mg elemental iron) oral tablet: 1 tab(s) by gastrostomy tube once a day (21 Nov 2022 04:24)  insulin glargine 100 units/mL subcutaneous solution: 8 unit(s) subcutaneous once a day (in the morning) (21 Nov 2022 04:24)  ipratropium-albuterol 0.5 mg-2.5 mg/3 mL inhalation solution: 3 milliliter(s) inhaled every 6 hours (21 Nov 2022 04:24)  LORazepam 1 mg oral tablet: 1 tab(s) by gastrostomy tube every 4 hours (21 Nov 2022 04:24)  midodrine 10 mg oral tablet: 1 tab(s) by gastrostomy tube every 8 hours (21 Nov 2022 04:24)  MiraLax oral powder for reconstitution: orally once a day (at bedtime) (21 Nov 2022 05:08)  mulivitamin: by gastrostomy tube once a day (21 Nov 2022 04:24)  nystatin 100,000 units/g topical powder: 1 application topically 3 times a day (21 Nov 2022 04:24)  omeprazole 40 mg oral delayed release capsule: 1 cap(s) by gastrostomy tube 2 times a day (21 Nov 2022 04:24)  polyethylene glycol 3350 oral powder for reconstitution: 17 gram(s) by gastrostomy tube every 12 hours (21 Nov 2022 04:24)  senna 8.6 mg oral tablet: 3 tab(s) by gastrostomy tube once a day (at bedtime) (21 Nov 2022 04:24)  simethicone 80 mg oral tablet, chewable: 1 tab(s) by gastrostomy tube every 6 hours (21 Nov 2022 04:24)  sucralfate 1 g/10 mL oral suspension: 10 milliliter(s) g-tube 4 times a day (before meals and at bedtime) (21 Nov 2022 04:24)  Tylenol 325 mg oral tablet: 2 tab(s) orally every 6 hours, As Needed prior to dressing change (21 Nov 2022 04:24)      Family History: Non-contributory family history of premature cardiovascular atherosclerotic disease    Social History: No tobacco, alcohol or drug use    Review of Systems:  Unable to obtain    Physical Exam:  Vitals:        Vital Signs Last 24 Hrs  T(C): 36.7 (21 Nov 2022 06:39), Max: 38.6 (21 Nov 2022 03:32)  T(F): 98.1 (21 Nov 2022 06:39), Max: 101.5 (21 Nov 2022 03:32)  HR: 76 (21 Nov 2022 07:05) (70 - 112)  BP: 95/49 (21 Nov 2022 06:55) (90/52 - 129/79)  BP(mean): 63 (21 Nov 2022 06:55) (63 - 65)  RR: 21 (21 Nov 2022 06:55) (16 - 30)  SpO2: 96% (21 Nov 2022 07:05) (95% - 100%)  Parameters below as of 21 Nov 2022 07:05  Patient On (Oxygen Delivery Method): ventilator,.50  General: NAD  HEENT: Trach  Neck: No JVD, no carotid bruit  Lungs: CTAB  CV: RRR, nl S1/S2, no M/R/G  Abdomen: S/NT/ND, +BS  Extremities: No LE edema, no cyanosis  Neuro: AAOx0  Skin: No rash    Labs:                        9.6    16.01 )-----------( 290      ( 21 Nov 2022 03:56 )             31.7     11-21    143  |  101  |  35<H>  ----------------------------<  124<H>  5.4<H>   |  31  |  1.25    Ca    9.4      21 Nov 2022 03:56    TPro  8.6<H>  /  Alb  2.6<L>  /  TBili  0.6  /  DBili  x   /  AST  41<H>  /  ALT  20  /  AlkPhos  128<H>  11-21        PT/INR - ( 21 Nov 2022 03:56 )   PT: 14.4 sec;   INR: 1.22 ratio         PTT - ( 21 Nov 2022 03:56 )  PTT:40.0 sec    ECG/Telemetry: NSR, incomplete RBBB

## 2022-11-21 NOTE — DIETITIAN INITIAL EVALUATION ADULT - PERTINENT LABORATORY DATA
11-21    143  |  101  |  35<H>  ----------------------------<  124<H>  5.4<H>   |  31  |  1.25    Ca    9.4      21 Nov 2022 03:56    TPro  8.6<H>  /  Alb  2.6<L>  /  TBili  0.6  /  DBili  x   /  AST  41<H>  /  ALT  20  /  AlkPhos  128<H>  11-21  POCT Blood Glucose.: 122 mg/dL (11-21-22 @ 11:34)  A1C with Estimated Average Glucose Result: 8.5 % (10-19-22 @ 09:05)  A1C with Estimated Average Glucose Result: 7.3 % (08-19-22 @ 06:36)  A1C with Estimated Average Glucose Result: 6.4 % (04-22-22 @ 12:14)

## 2022-11-21 NOTE — PATIENT PROFILE ADULT - PUBLIC BENEFITS
PT in clinic for voiding trial and 24fr cath removal. 20 ml of dark urine in cath bag attached to right leg. 120cc of sterile water inserted into bladder via catheter, sterile technique maintained. 25cc removed from catheter balloon, ballon deflated and removed. 100 ml red tinged urine with very few clots voided. Pt advised to continue rx and increase fluids, will f/u with pt by noon.     
no

## 2022-11-21 NOTE — CONSULT NOTE ADULT - SUBJECTIVE AND OBJECTIVE BOX
[facility], Division of Infectious Diseases  MOIZ Lora S. Shah, Y. Patel, G. [facility]  [phone]    BREA BECKHAM  77y, Female  [id number]    HPI--  HPI:  77F with dementia, COPD with chronic respiratory failure s/p trach, cardiac arrest, CHH, quadriplegia, CVA, CKD, anemia, PEG tube, and multiple hospital admissions for respiratory distress presents to the ED BIBEMS from [facility] for diaphoresis and fever.   Patient unable to provide any history.  Patient was just discharged here from - for low grade fever, midline malfunction, leukocytosis, fever, concerning for sepsis possibly 2/2 unresolved VAP.  Was treated with meropenem.  Per notes from [facility], around 3am, patient was does "not look good" and was noted to be diaphoretic, tachypneic, and febrile to 100.9F.  Was sent here for further evaluation.  In the ED, patient was febrile to 101.5F and tachycardic to 112.  Labs showed leukocytosis of 16 (up from 6.5), hyperkalemia 5.4, ABG 7.59/36/273.  Patient is being readmitted for sepsis 2/2 presumably from VAP.   (2022 04:55)    Pt seen at bedside  Notes reviewed      Active Medications--  albuterol    90 MICROgram(s) HFA Inhaler 2 Puff(s) Inhalation two times a day  chlorhexidine 0.12% Liquid 15 milliLiter(s) Oral Mucosa every 12 hours  dextrose 5%. 1000 milliLiter(s) IV Continuous <Continuous>  dextrose 5%. 1000 milliLiter(s) IV Continuous <Continuous>  dextrose 50% Injectable 25 Gram(s) IV Push once  dextrose 50% Injectable 12.5 Gram(s) IV Push once  dextrose 50% Injectable 25 Gram(s) IV Push once  dextrose Oral Gel 15 Gram(s) Oral once PRN  glucagon  Injectable 1 milliGRAM(s) IntraMuscular once  heparin   Injectable 5000 Unit(s) SubCutaneous every 12 hours  insulin glargine Injectable (LANTUS) 8 Unit(s) SubCutaneous every morning  insulin lispro (ADMELOG) corrective regimen sliding scale   SubCutaneous three times a day before meals  LORazepam     Tablet 1 milliGRAM(s) Oral every 4 hours  midodrine 10 milliGRAM(s) Oral every 8 hours  norepinephrine Infusion 0.05 MICROgram(s)/kG/Min IV Continuous <Continuous>  pantoprazole  Injectable 40 milliGRAM(s) IV Push daily  polyethylene glycol 3350 17 Gram(s) Oral every 12 hours  senna 2 Tablet(s) Oral at bedtime  trimethoprim   80 mG/sulfamethoxazole 400 mG 1 Tablet(s) Oral two times a day  vancomycin  IVPB      vancomycin  IVPB 750 milliGRAM(s) IV Intermittent every 12 hours    Antimicrobials:   trimethoprim   80 mG/sulfamethoxazole 400 mG 1 Tablet(s) Oral two times a day  vancomycin  IVPB      vancomycin  IVPB 750 milliGRAM(s) IV Intermittent every 12 hours    Immunologic:     ROS:  unable to obtain    Allergies: codeine (Hives)    PMH -- Dementia of frontal lobe type    Aphasic stroke    Diabetes mellitus    Respiratory failure    Hypertension    GERD (gastroesophageal reflux disease)    Constipation    Respiratory failure    CVA (cerebral vascular accident)    HTN (hypertension)    DM (diabetes mellitus)    Advanced dementia    COVID-19 virus detected    Quadriplegia    Pneumonia    Type II diabetes mellitus      PSH -- Hx of appendectomy    Gastrostomy in place    Tracheostomy in place    Tracheostomy tube present    Feeding by G-tube      FH -- No pertinent family history in first degree relatives    No pertinent family history in first degree relatives    No pertinent family history in first degree relatives    No pertinent family history in first degree relatives    No pertinent family history in first degree relatives      Social History --  EtOH: denies   Tobacco: denies   Drug Use: denies     Travel/Environmental/Occupational History:    Physical Exam--  Vital Signs Last 24 Hrs  T(F): 98.1 (2022 06:39), Max: 101.5 (2022 03:32)  HR: 76 (2022 07:05) (70 - 112)  BP: 95/49 (2022 06:55) (90/52 - 129/79)  RR: 21 (2022 06:55) (16 - 30)  SpO2: 96% (2022 07:05) (95% - 100%)  General: vented pt, nonverbal, vegetative state  HEENT: NC/AT  Neck: trach to vent  Lungs: MV breath sounds  Heart: Regular rate and rhythm.  Abdomen: Soft, distended, PGT in place.  Extremities: b/l 1st toes w/ appearance of dry gangrene  Skin: Warm.   Laboratory & Imaging Data:  CBC:                       9.6    16.01 )-----------( 290      ( 2022 03:56 )             31.7     CMP:     143  |  101  |  35<H>  ----------------------------<  124<H>  5.4<H>   |  31  |  1.25    Ca    9.4      2022 03:56    TPro  8.6<H>  /  Alb  2.6<L>  /  TBili  0.6  /  DBili  x   /  AST  41<H>  /  ALT  20  /  AlkPhos  128<H>      LIVER FUNCTIONS - ( 2022 03:56 )  Alb: 2.6 g/dL / Pro: 8.6 g/dL / ALK PHOS: 128 U/L / ALT: 20 U/L DA / AST: 41 U/L / GGT: x           Urinalysis Basic - ( 2022 04:25 )    Color: Yellow / Appearance: Clear / S.015 / pH: x  Gluc: x / Ketone: Trace  / Bili: Negative / Urobili: Negative mg/dL   Blood: x / Protein: 500 mg/dL / Nitrite: Negative   Leuk Esterase: Trace / RBC: 6-10 /HPF / WBC 3-5   Sq Epi: x / Non Sq Epi: Many / Bacteria: Few        Microbiology: reviewed    Culture - Sputum (collected 22 @ 01:44)  Source: Trach Asp Tracheal Aspirate  Gram Stain (22 @ 21:20):    No polymorphonuclear leukocytes per low power field    No Squamous epithelial cells per low power field    No organisms seen per oil power field  Final Report (22 @ 12:08):    Rare Stenotrophomonas maltophilia    Normal Respiratory Elvira present  Organism: Stenotrophomonas maltophilia (22 @ 12:08)  Organism: Stenotrophomonas maltophilia (22 @ 12:08)      -  Minocycline: S      Method Type: KB  Organism: Stenotrophomonas maltophilia (22 @ 12:08)      -  Levofloxacin: I 4      -  Trimethoprim/Sulfamethoxazole: S       Method Type: PRATIK    Culture - Urine (collected 22 @ 06:53)  Source: Clean Catch Clean Catch (Midstream)  Final Report (22 @ 00:07):    <10,000 CFU/mL Normal Urogenital Elvira    Culture - Blood (collected 22 @ 06:53)  Source: .Blood Blood-Peripheral  Final Report (22 @ 13:00):    No Growth Final    Culture - Blood (collected 22 @ 06:53)  Source: .Blood Blood-Peripheral  Final Report (22 @ 13:00):    No Growth Final    Culture - Abscess with Gram Stain (collected 22 @ 23:56)  Source: .Abscess foot  Final Report (22 @ 08:25):    Numerous Acinetobacter baumannii (Carbapenem Resistant) /nosocomialis    group    Few Candida tropicalis "Susceptibilities not performed"    Few Methicillin Resistant Staphylococcus aureus  Organism: Acinetobacter baumannii (Carbapenem Resistant)  Methicillin resistant Staphylococcus aureus (22 @ 08:25)  Organism: Methicillin resistant Staphylococcus aureus (22 @ 08:25)      -  Ampicillin/Sulbactam: R 16/8      -  Cefazolin: R >16      -  Clindamycin: S <=0.25      -  Daptomycin: S 1      -  Erythromycin: R >4      -  Gentamicin: S <=1 Should not be used as monotherapy      -  Linezolid: S 2      -  Oxacillin: R >2      -  Penicillin: R >8      -  Rifampin: S <=1 Should not be used as monotherapy      -  Tetracycline: S <=1      -  Trimethoprim/Sulfamethoxazole: S <=0.5/9.5      -  Vancomycin: S 1      Method Type: PRATIK  Organism: Acinetobacter baumannii (Carbapenem Resistant) (22 @ 08:25)      -  Polymyxin B: R 4      Method Type: ETEST  Organism: Acinetobacter baumannii (Carbapenem Resistant) (22 @ 08:25)      -  Minocycline: S      -  Piperacillin/Tazobactam: R      Method Type: KB  Organism: Acinetobacter baumannii (Carbapenem Resistant) (22 @ 08:25)      -  Amikacin: R >32      -  Ampicillin/Sulbactam: S 8/4      -  Cefepime: R >16      -  Ceftazidime: R >16      -  Ciprofloxacin: R >2      -  Gentamicin: R >8      -  Imipenem: R >8      -  Levofloxacin: R >4      -  Meropenem: R >8      -  Tobramycin: S <=2      -  Trimethoprim/Sulfamethoxazole: R >2/38      Method Type: PRATIK          Radiology: reviewed       [facility], Division of Infectious Diseases  MOIZ Lora S. Shah, Y. Patel, G. [facility]  [phone]    BREA BECKHAM  77y, Female  [id number]    HPI--  HPI:  77F with dementia, COPD with chronic respiratory failure s/p trach, cardiac arrest, CHH, quadriplegia, CVA, CKD, anemia, PEG tube, and multiple hospital admissions for respiratory distress presents to the ED BIBEMS from [facility] for diaphoresis and fever.   Patient unable to provide any history.  Patient was just discharged here from - for low grade fever, midline malfunction, leukocytosis, fever, concerning for sepsis possibly 2/2 recurrent VAP.  Was treated with meropenem.  Per notes from [facility], around 3am, patient was does "not look good" and was noted to be diaphoretic, tachypneic, and febrile to 100.9F.  Was sent here for further evaluation.  In the ED, patient was febrile to 101.5F and tachycardic to 112.  Labs showed leukocytosis of 16 (up from 6.5), hyperkalemia 5.4, ABG 7.59/36/273.  Patient is being readmitted for sepsis 2/2 presumably from VAP.   (2022 04:55)    Pt seen at bedside  Notes reviewed      Active Medications--  albuterol    90 MICROgram(s) HFA Inhaler 2 Puff(s) Inhalation two times a day  chlorhexidine 0.12% Liquid 15 milliLiter(s) Oral Mucosa every 12 hours  dextrose 5%. 1000 milliLiter(s) IV Continuous <Continuous>  dextrose 5%. 1000 milliLiter(s) IV Continuous <Continuous>  dextrose 50% Injectable 25 Gram(s) IV Push once  dextrose 50% Injectable 12.5 Gram(s) IV Push once  dextrose 50% Injectable 25 Gram(s) IV Push once  dextrose Oral Gel 15 Gram(s) Oral once PRN  glucagon  Injectable 1 milliGRAM(s) IntraMuscular once  heparin   Injectable 5000 Unit(s) SubCutaneous every 12 hours  insulin glargine Injectable (LANTUS) 8 Unit(s) SubCutaneous every morning  insulin lispro (ADMELOG) corrective regimen sliding scale   SubCutaneous three times a day before meals  LORazepam     Tablet 1 milliGRAM(s) Oral every 4 hours  midodrine 10 milliGRAM(s) Oral every 8 hours  norepinephrine Infusion 0.05 MICROgram(s)/kG/Min IV Continuous <Continuous>  pantoprazole  Injectable 40 milliGRAM(s) IV Push daily  polyethylene glycol 3350 17 Gram(s) Oral every 12 hours  senna 2 Tablet(s) Oral at bedtime  trimethoprim   80 mG/sulfamethoxazole 400 mG 1 Tablet(s) Oral two times a day  vancomycin  IVPB      vancomycin  IVPB 750 milliGRAM(s) IV Intermittent every 12 hours    Antimicrobials:   trimethoprim   80 mG/sulfamethoxazole 400 mG 1 Tablet(s) Oral two times a day  vancomycin  IVPB      vancomycin  IVPB 750 milliGRAM(s) IV Intermittent every 12 hours    Immunologic:     ROS:  unable to obtain    Allergies: codeine (Hives)    PMH -- Dementia of frontal lobe type    Aphasic stroke    Diabetes mellitus    Respiratory failure    Hypertension    GERD (gastroesophageal reflux disease)    Constipation    Respiratory failure    CVA (cerebral vascular accident)    HTN (hypertension)    DM (diabetes mellitus)    Advanced dementia    COVID-19 virus detected    Quadriplegia    Pneumonia    Type II diabetes mellitus      PSH -- Hx of appendectomy    Gastrostomy in place    Tracheostomy in place    Tracheostomy tube present    Feeding by G-tube      FH -- No pertinent family history in first degree relatives    No pertinent family history in first degree relatives    No pertinent family history in first degree relatives    No pertinent family history in first degree relatives    No pertinent family history in first degree relatives      Social History --  EtOH: denies   Tobacco: denies   Drug Use: denies     Travel/Environmental/Occupational History:    Physical Exam--  Vital Signs Last 24 Hrs  T(F): 98.1 (2022 06:39), Max: 101.5 (2022 03:32)  HR: 76 (2022 07:05) (70 - 112)  BP: 95/49 (2022 06:55) (90/52 - 129/79)  RR: 21 (2022 06:55) (16 - 30)  SpO2: 96% (2022 07:05) (95% - 100%)  General: vented pt, nonverbal, vegetative state  HEENT: NC/AT  Neck: trach to vent  Lungs: MV breath sounds  Heart: Regular rate and rhythm.  Abdomen: Soft, distended, PGT in place.  Extremities: b/l 1st toes w/ appearance of dry gangrene  Skin: Warm.       Laboratory & Imaging Data:  CBC:                       9.6    16.01 )-----------( 290      ( 2022 03:56 )             31.7     CMP:     143  |  101  |  35<H>  ----------------------------<  124<H>  5.4<H>   |  31  |  1.25    Ca    9.4      2022 03:56    TPro  8.6<H>  /  Alb  2.6<L>  /  TBili  0.6  /  DBili  x   /  AST  41<H>  /  ALT  20  /  AlkPhos  128<H>      LIVER FUNCTIONS - ( 2022 03:56 )  Alb: 2.6 g/dL / Pro: 8.6 g/dL / ALK PHOS: 128 U/L / ALT: 20 U/L DA / AST: 41 U/L / GGT: x           Urinalysis Basic - ( 2022 04:25 )    Color: Yellow / Appearance: Clear / S.015 / pH: x  Gluc: x / Ketone: Trace  / Bili: Negative / Urobili: Negative mg/dL   Blood: x / Protein: 500 mg/dL / Nitrite: Negative   Leuk Esterase: Trace / RBC: 6-10 /HPF / WBC 3-5   Sq Epi: x / Non Sq Epi: Many / Bacteria: Few        Microbiology: reviewed    Culture - Sputum (collected 22 @ 01:44)  Source: Trach Asp Tracheal Aspirate  Gram Stain (22 @ 21:20):    No polymorphonuclear leukocytes per low power field    No Squamous epithelial cells per low power field    No organisms seen per oil power field  Final Report (22 @ 12:08):    Rare Stenotrophomonas maltophilia    Normal Respiratory Elvira present  Organism: Stenotrophomonas maltophilia (22 @ 12:08)  Organism: Stenotrophomonas maltophilia (22 @ 12:08)      -  Minocycline: S      Method Type: KB  Organism: Stenotrophomonas maltophilia (22 @ 12:08)      -  Levofloxacin: I 4      -  Trimethoprim/Sulfamethoxazole: S       Method Type: PRATIK    Culture - Urine (collected 22 @ 06:53)  Source: Clean Catch Clean Catch (Midstream)  Final Report (22 @ 00:07):    <10,000 CFU/mL Normal Urogenital Elvira    Culture - Blood (collected 22 @ 06:53)  Source: .Blood Blood-Peripheral  Final Report (22 @ 13:00):    No Growth Final    Culture - Blood (collected 22 @ 06:53)  Source: .Blood Blood-Peripheral  Final Report (22 @ 13:00):    No Growth Final    Culture - Abscess with Gram Stain (collected 22 @ 23:56)  Source: .Abscess foot  Final Report (22 @ 08:25):    Numerous Acinetobacter baumannii (Carbapenem Resistant) /nosocomialis    group    Few Candida tropicalis "Susceptibilities not performed"    Few Methicillin Resistant Staphylococcus aureus  Organism: Acinetobacter baumannii (Carbapenem Resistant)  Methicillin resistant Staphylococcus aureus (22 @ 08:25)  Organism: Methicillin resistant Staphylococcus aureus (22 @ 08:25)      -  Ampicillin/Sulbactam: R 16/8      -  Cefazolin: R >16      -  Clindamycin: S <=0.25      -  Daptomycin: S 1      -  Erythromycin: R >4      -  Gentamicin: S <=1 Should not be used as monotherapy      -  Linezolid: S 2      -  Oxacillin: R >2      -  Penicillin: R >8      -  Rifampin: S <=1 Should not be used as monotherapy      -  Tetracycline: S <=1      -  Trimethoprim/Sulfamethoxazole: S <=0.5/9.5      -  Vancomycin: S 1      Method Type: PRATIK  Organism: Acinetobacter baumannii (Carbapenem Resistant) (22 @ 08:25)      -  Polymyxin B: R 4      Method Type: ETEST  Organism: Acinetobacter baumannii (Carbapenem Resistant) (22 @ 08:25)      -  Minocycline: S      -  Piperacillin/Tazobactam: R      Method Type: KB  Organism: Acinetobacter baumannii (Carbapenem Resistant) (22 @ 08:25)      -  Amikacin: R >32      -  Ampicillin/Sulbactam: S 8/4      -  Cefepime: R >16      -  Ceftazidime: R >16      -  Ciprofloxacin: R >2      -  Gentamicin: R >8      -  Imipenem: R >8      -  Levofloxacin: R >4      -  Meropenem: R >8      -  Tobramycin: S <=2      -  Trimethoprim/Sulfamethoxazole: R >2/38      Method Type: PRATIK          Radiology: reviewed

## 2022-11-21 NOTE — CONSULT NOTE ADULT - SUBJECTIVE AND OBJECTIVE BOX
BREA BECKHAM     [facility] 03    Allergies    codeine (Hives)    Intolerances        PAST MEDICAL & SURGICAL HISTORY:  Dementia of frontal lobe type      Aphasic stroke      Diabetes mellitus      Respiratory failure      Hypertension      GERD (gastroesophageal reflux disease)      Constipation      Respiratory failure      CVA (cerebral vascular accident)      HTN (hypertension)      DM (diabetes mellitus)      Advanced dementia      COVID-19 virus detected      Quadriplegia      Pneumonia      Type II diabetes mellitus      Hx of appendectomy      Gastrostomy in place      Tracheostomy in place      Tracheostomy tube present      Feeding by G-tube          FAMILY HISTORY:  No pertinent family history in first degree relatives        Home Medications:  Admelog 100 units/mL injectable solution: injectable every 6 hours SS (2022 05:08)  albuterol 90 mcg/inh inhalation aerosol with adapter: 2  inhaled every 6 hours (2022 04:24)  Bacid (LAC) oral tablet: 2 tab(s) by gastrostomy tube once a day (2022 04:24)  bacitracin 500 units/g topical ointment: Apply topically to affected area once a day to knees (2022 04:24)  chlorhexidine 0.12% mucous membrane liquid: 15 milliliter(s) mucous membrane 2 times a day (2022 04:24)  Dakins Full Strength 0.5% topical solution: Apply topically to affected area 2 times a day then apply santyl and calcium alginate (2022 04:24)  Eucerin topical cream: Apply topically to affected area once a day bilateral feet (2022 04:24)  ferrous sulfate 325 mg (65 mg elemental iron) oral tablet: 1 tab(s) by gastrostomy tube once a day (2022 04:24)  insulin glargine 100 units/mL subcutaneous solution: 8 unit(s) subcutaneous once a day (in the morning) (2022 04:24)  ipratropium-albuterol 0.5 mg-2.5 mg/3 mL inhalation solution: 3 milliliter(s) inhaled every 6 hours (2022 04:24)  LORazepam 1 mg oral tablet: 1 tab(s) by gastrostomy tube every 4 hours (2022 04:24)  midodrine 10 mg oral tablet: 1 tab(s) by gastrostomy tube every 8 hours (2022 04:24)  MiraLax oral powder for reconstitution: orally once a day (at bedtime) (2022 05:08)  mulivitamin: by gastrostomy tube once a day (2022 04:24)  nystatin 100,000 units/g topical powder: 1 application topically 3 times a day (2022 04:24)  omeprazole 40 mg oral delayed release capsule: 1 cap(s) by gastrostomy tube 2 times a day (2022 04:24)  polyethylene glycol 3350 oral powder for reconstitution: 17 gram(s) by gastrostomy tube every 12 hours (2022 04:24)  senna 8.6 mg oral tablet: 3 tab(s) by gastrostomy tube once a day (at bedtime) (2022 04:24)  simethicone 80 mg oral tablet, chewable: 1 tab(s) by gastrostomy tube every 6 hours (:24)  sucralfate 1 g/10 mL oral suspension: 10 milliliter(s) g-tube 4 times a day (before meals and at bedtime) (:24)  Tylenol 325 mg oral tablet: 2 tab(s) orally every 6 hours, As Needed prior to dressing change (2022 04:24)      MEDICATIONS  (STANDING):  albuterol    90 MICROgram(s) HFA Inhaler 2 Puff(s) Inhalation two times a day  chlorhexidine 0.12% Liquid 15 milliLiter(s) Oral Mucosa every 12 hours  dextrose 5%. 1000 milliLiter(s) (50 mL/Hr) IV Continuous <Continuous>  dextrose 5%. 1000 milliLiter(s) (100 mL/Hr) IV Continuous <Continuous>  dextrose 50% Injectable 25 Gram(s) IV Push once  dextrose 50% Injectable 12.5 Gram(s) IV Push once  dextrose 50% Injectable 25 Gram(s) IV Push once  glucagon  Injectable 1 milliGRAM(s) IntraMuscular once  heparin   Injectable 5000 Unit(s) SubCutaneous every 12 hours  insulin glargine Injectable (LANTUS) 8 Unit(s) SubCutaneous every morning  insulin lispro (ADMELOG) corrective regimen sliding scale   SubCutaneous three times a day before meals  LORazepam     Tablet 1 milliGRAM(s) Oral every 4 hours  midodrine 10 milliGRAM(s) Oral every 8 hours  norepinephrine Infusion 0.05 MICROgram(s)/kG/Min (5.35 mL/Hr) IV Continuous <Continuous>  pantoprazole  Injectable 40 milliGRAM(s) IV Push daily  polyethylene glycol 3350 17 Gram(s) Oral every 12 hours  senna 2 Tablet(s) Oral at bedtime  trimethoprim   80 mG/sulfamethoxazole 400 mG 1 Tablet(s) Oral two times a day  vancomycin  IVPB      vancomycin  IVPB 750 milliGRAM(s) IV Intermittent every 12 hours    MEDICATIONS  (PRN):  dextrose Oral Gel 15 Gram(s) Oral once PRN Blood Glucose LESS THAN 70 milliGRAM(s)/deciliter      Diet, NPO with Tube Feed:   Tube Feeding Modality: Gastrostomy  Glucerna 1.5 Horacio  Total Volume for 24 Hours (mL): 1000  Continuous  Starting Tube Feed Rate mL per Hour: 10  Increase Tube Feed Rate by (mL): 10     Every 10 hours  Until Goal Tube Feed Rate (mL per Hour): 50  Tube Feed Duration (in Hours): 20  Tube Feed Start Time: 17:00  Free Water Flush  Pump   Rate (mL per Hour): 25   Frequency: Every Hour    Duration (Hours): 24 (22 @ 05:10) [Active]          Vital Signs Last 24 Hrs  T(C): 36.7 (2022 06:39), Max: 38.6 (2022 03:32)  T(F): 98.1 (2022 06:39), Max: 101.5 (2022 03:32)  HR: 76 (2022 07:05) (70 - 112)  BP: 95/49 (2022 06:55) (90/52 - 129/79)  BP(mean): 63 (2022 06:55) (63 - 65)  RR: 21 (2022 06:55) (16 - 30)  SpO2: 96% (2022 07:05) (95% - 100%)    Parameters below as of 2022 07:05  Patient On (Oxygen Delivery Method): ventilator,.50            Mode: AC/ CMV (Assist Control/ Continuous Mandatory Ventilation), RR (machine): 18, TV (machine): 350, FiO2: 50, PEEP: 5, ITime: 1, MAP: 16, PIP: 44      LABS:                        9.6    16.01 )-----------( 290      ( 2022 03:56 )             31.7         143  |  101  |  35<H>  ----------------------------<  124<H>  5.4<H>   |  31  |  1.25    Ca    9.4      2022 03:56    TPro  8.6<H>  /  Alb  2.6<L>  /  TBili  0.6  /  DBili  x   /  AST  41<H>  /  ALT  20  /  AlkPhos  128<H>      PT/INR - ( 2022 03:56 )   PT: 14.4 sec;   INR: 1.22 ratio         PTT - ( 2022 03:56 )  PTT:40.0 sec  Urinalysis Basic - ( 2022 04:25 )    Color: Yellow / Appearance: Clear / S.015 / pH: x  Gluc: x / Ketone: Trace  / Bili: Negative / Urobili: Negative mg/dL   Blood: x / Protein: 500 mg/dL / Nitrite: Negative   Leuk Esterase: Trace / RBC: 6-10 /HPF / WBC 3-5   Sq Epi: x / Non Sq Epi: Many / Bacteria: Few        ABG - ( 2022 04:09 )  pH, Arterial: 7.59  pH, Blood: x     /  pCO2: 36    /  pO2: 273   / HCO3: 34    / Base Excess: 12.8  /  SaO2: 99.8                WBC:  WBC Count: 16.01 K/uL ( @ 03:56)      MICROBIOLOGY:  RECENT CULTURES:              PT/INR - ( 2022 03:56 )   PT: 14.4 sec;   INR: 1.22 ratio         PTT - ( 2022 03:56 )  PTT:40.0 sec    Sodium:  Sodium, Serum: 143 mmol/L ( @ 03:56)      1.25 mg/dL  @ 03:56      Hemoglobin:  Hemoglobin: 9.6 g/dL ( @ 03:56)      Platelets: Platelet Count - Automated: 290 K/uL ( @ 03:56)      LIVER FUNCTIONS - ( 2022 03:56 )  Alb: 2.6 g/dL / Pro: 8.6 g/dL / ALK PHOS: 128 U/L / ALT: 20 U/L DA / AST: 41 U/L / GGT: x             Urinalysis Basic - ( 2022 04:25 )    Color: Yellow / Appearance: Clear / S.015 / pH: x  Gluc: x / Ketone: Trace  / Bili: Negative / Urobili: Negative mg/dL   Blood: x / Protein: 500 mg/dL / Nitrite: Negative   Leuk Esterase: Trace / RBC: 6-10 /HPF / WBC 3-5   Sq Epi: x / Non Sq Epi: Many / Bacteria: Few        RADIOLOGY & ADDITIONAL STUDIES:      MICROBIOLOGY:  RECENT CULTURES:

## 2022-11-21 NOTE — ED PROVIDER NOTE - WR ORDER ID 1
Patient Requesting a refill.    Medication(s) for Tawadna Orourke submitted for a refill request and is pending approval from the Provider.    Caller has been advised that their call does not guarantee an immediate refill. This refill will be reviewed within 24-72 hours by a qualified provider who will determine whether he or she can refill the medication.    Patient has contacted the pharmacy?  n/a    Call Back Number: [phone]     Can a detailed message be left?  Yes    Additional information: Patient states he just needs a refill of hydrocodone until he gets into Pain Management 3/16/2022. He states he had seen Dr. Boothe on 2/22/2022. He states Dr. Grady did prescribe this and he is only taking them as prescribed.     Patient’s preferred pharmacy has been noted and populated.       [id number] -[place], [place] - [address]  [address]  [address]  [place] [address]  Phone: [phone] Fax: [phone]         [id number]

## 2022-11-21 NOTE — DIETITIAN INITIAL EVALUATION ADULT - PERTINENT MEDS FT
MEDICATIONS  (STANDING):  albuterol    90 MICROgram(s) HFA Inhaler 2 Puff(s) Inhalation two times a day  chlorhexidine 0.12% Liquid 15 milliLiter(s) Oral Mucosa every 12 hours  dextrose 5%. 1000 milliLiter(s) (50 mL/Hr) IV Continuous <Continuous>  dextrose 5%. 1000 milliLiter(s) (100 mL/Hr) IV Continuous <Continuous>  dextrose 50% Injectable 25 Gram(s) IV Push once  dextrose 50% Injectable 12.5 Gram(s) IV Push once  dextrose 50% Injectable 25 Gram(s) IV Push once  glucagon  Injectable 1 milliGRAM(s) IntraMuscular once  heparin   Injectable 5000 Unit(s) SubCutaneous every 12 hours  insulin glargine Injectable (LANTUS) 8 Unit(s) SubCutaneous every morning  insulin lispro (ADMELOG) corrective regimen sliding scale   SubCutaneous three times a day before meals  LORazepam     Tablet 1 milliGRAM(s) Oral every 4 hours  midodrine 10 milliGRAM(s) Oral every 8 hours  norepinephrine Infusion 0.05 MICROgram(s)/kG/Min (5.35 mL/Hr) IV Continuous <Continuous>  pantoprazole  Injectable 40 milliGRAM(s) IV Push daily  polyethylene glycol 3350 17 Gram(s) Oral every 12 hours  senna 2 Tablet(s) Oral at bedtime  trimethoprim   80 mG/sulfamethoxazole 400 mG 1 Tablet(s) Oral two times a day  vancomycin  IVPB      vancomycin  IVPB 750 milliGRAM(s) IV Intermittent every 12 hours    MEDICATIONS  (PRN):  dextrose Oral Gel 15 Gram(s) Oral once PRN Blood Glucose LESS THAN 70 milliGRAM(s)/deciliter

## 2022-11-21 NOTE — CONSULT NOTE ADULT - SUBJECTIVE AND OBJECTIVE BOX
REASON FOR CONSULT: Severe sepsis/ VAP    CONSULT REQUESTED BY: ED    Patient is a 77y old  Female who presents with a chief complaint of fever    BRIEF HOSPITAL COURSE:   76 y/o Full code F with pmhx of dementia, COPD with chronic respiratory failure, S/p Trach and peg ( Vent Dependent), Cardiac arrest, quadriplegia, CVA, CKD, Anemia, and multiple admissions to hospital ( over 4 in past 6 weeks) including being discharged on  with VAP presents to ED from [facility] with fevers and abnormal labs". Lab work significant for WBC 16 with left shift, Scr 1.25 (baseline around 1.15), K+ 5.4, Lactate 2.9. CXR appears to show worsening infiltrate in R lung with unchanged pleural effusion on nonofficial read. Hypoxic RF with Fio2 requirements at 50 %/ 18/400/5.       PAST MEDICAL & SURGICAL HISTORY:  Dementia of frontal lobe type      Aphasic stroke      Diabetes mellitus      Respiratory failure      Hypertension      GERD (gastroesophageal reflux disease)      Constipation      Respiratory failure      CVA (cerebral vascular accident)      HTN (hypertension)      DM (diabetes mellitus)      Advanced dementia      COVID-19 virus detected      Quadriplegia      Pneumonia      Type II diabetes mellitus      Hx of appendectomy      Gastrostomy in place      Tracheostomy in place      Tracheostomy tube present      Feeding by G-tube        Allergies    codeine (Hives)    Intolerances      FAMILY HISTORY:  No pertinent family history in first degree relatives        Review of Systems:  Negative 2/2 to cognition       Medications:  meropenem  IVPB 1000 milliGRAM(s) IV Intermittent Once  trimethoprim   80 mG/sulfamethoxazole 400 mG 1 Tablet(s) Oral two times a day  vancomycin  IVPB      vancomycin  IVPB 750 milliGRAM(s) IV Intermittent once  vancomycin  IVPB 750 milliGRAM(s) IV Intermittent every 12 hours      albuterol    90 MICROgram(s) HFA Inhaler 2 Puff(s) Inhalation two times a day    LORazepam     Tablet 1 milliGRAM(s) Oral every 4 hours      heparin   Injectable 5000 Unit(s) SubCutaneous every 12 hours    pantoprazole  Injectable 40 milliGRAM(s) IV Push daily  polyethylene glycol 3350 17 Gram(s) Oral every 12 hours  senna 2 Tablet(s) Oral at bedtime      dextrose 50% Injectable 25 Gram(s) IV Push once  dextrose 50% Injectable 12.5 Gram(s) IV Push once  dextrose 50% Injectable 25 Gram(s) IV Push once  dextrose Oral Gel 15 Gram(s) Oral once PRN  glucagon  Injectable 1 milliGRAM(s) IntraMuscular once  insulin glargine Injectable (LANTUS) 8 Unit(s) SubCutaneous every morning  insulin lispro (ADMELOG) corrective regimen sliding scale   SubCutaneous three times a day before meals    dextrose 5%. 1000 milliLiter(s) IV Continuous <Continuous>  dextrose 5%. 1000 milliLiter(s) IV Continuous <Continuous>      chlorhexidine 0.12% Liquid 15 milliLiter(s) Oral Mucosa every 12 hours  chlorhexidine 4% Liquid 1 Application(s) Topical <User Schedule>    sodium zirconium cyclosilicate 10 Gram(s) Oral once      Mode: AC/ CMV (Assist Control/ Continuous Mandatory Ventilation)  RR (machine): 18  TV (machine): 400  FiO2: 100  PEEP: 5  ITime: 0.8  MAP: 21  PIP: 45      ICU Vital Signs Last 24 Hrs  T(C): 38.6 (2022 03:32), Max: 38.6 (2022 03:32)  T(F): 101.5 (2022 03:32), Max: 101.5 (2022 03:32)  HR: 102 (2022 03:45) (102 - 112)  BP: 129/79 (2022 03:32) (129/79 - 129/79)  BP(mean): --  ABP: --  ABP(mean): --  RR: 22 (2022 03:32) (22 - 22)  SpO2: 100% (2022 03:45) (97% - 100%)    O2 Parameters below as of 2022 03:32  Patient On (Oxygen Delivery Method): room air          Vital Signs Last 24 Hrs  T(C): 38.6 (2022 03:32), Max: 38.6 (2022 03:32)  T(F): 101.5 (2022 03:32), Max: 101.5 (2022 03:32)  HR: 102 (2022 03:45) (102 - 112)  BP: 129/79 (2022 03:32) (129/79 - 129/79)  BP(mean): --  RR: 22 (2022 03:32) (22 - 22)  SpO2: 100% (2022 03:45) (97% - 100%)    Parameters below as of 2022 03:32  Patient On (Oxygen Delivery Method): room air        ABG - ( 2022 04:09 )  pH, Arterial: 7.59  pH, Blood: x     /  pCO2: 36    /  pO2: 273   / HCO3: 34    / Base Excess: 12.8  /  SaO2: 99.8                I&O's Detail        LABS:                        9.6    16.01 )-----------( 290      ( 2022 03:56 )             31.7     11-    143  |  101  |  35<H>  ----------------------------<  124<H>  5.4<H>   |  31  |  1.25    Ca    9.4      2022 03:56    TPro  8.6<H>  /  Alb  2.6<L>  /  TBili  0.6  /  DBili  x   /  AST  41<H>  /  ALT  20  /  AlkPhos  128<H>  11-21          CAPILLARY BLOOD GLUCOSE        PT/INR - ( 2022 03:56 )   PT: 14.4 sec;   INR: 1.22 ratio         PTT - ( 2022 03:56 )  PTT:40.0 sec  Urinalysis Basic - ( 2022 04:25 )    Color: Yellow / Appearance: Clear / S.015 / pH: x  Gluc: x / Ketone: Trace  / Bili: Negative / Urobili: Negative mg/dL   Blood: x / Protein: 500 mg/dL / Nitrite: Negative   Leuk Esterase: Trace / RBC: x / WBC x   Sq Epi: x / Non Sq Epi: x / Bacteria: x      CULTURES:      Physical Examination:    General: Vented. No acute distress    Neuro: At baseline mental status, arousable, A O X1. Cn2-12 intact    HEENT: Pupils equal, reactive to light.  Symmetric.    PULM: Rhonchi heard B/l. No increased work of breathing     CVS: Regular rate and rhythm, no murmurs, rubs, or gallops    ABD: Soft, nondistended, nontender, normoactive bowel sounds, no masses    EXT: No edema, nontender    SKIN: Warm and well perfused, no rashes noted.             REASON FOR CONSULT: Severe sepsis/ VAP    CONSULT REQUESTED BY: ED    Patient is a 77y old  Female who presents with a chief complaint of fever    BRIEF HOSPITAL COURSE:   78 y/o Full code F with pmhx of dementia, COPD with chronic respiratory failure, S/p Trach and peg ( Vent Dependent), Cardiac arrest, quadriplegia, CVA, CKD, Anemia, and multiple admissions to hospital ( twice in past 6 weeks) including being discharged on  with VAP presents to ED from [facility] with fevers and abnormal labs". Lab work significant for WBC 16 with left shift, Scr 1.25 (baseline around 1.15), K+ 5.4, Lactate 2.9. CXR appears to show worsening infiltrate in R lung with unchanged pleural effusion on nonofficial read. Hypoxic RF with Fio2 requirements at 50 %/ 18/400/5.       PAST MEDICAL & SURGICAL HISTORY:  Dementia of frontal lobe type      Aphasic stroke      Diabetes mellitus      Respiratory failure      Hypertension      GERD (gastroesophageal reflux disease)      Constipation      Respiratory failure      CVA (cerebral vascular accident)      HTN (hypertension)      DM (diabetes mellitus)      Advanced dementia      COVID-19 virus detected      Quadriplegia      Pneumonia      Type II diabetes mellitus      Hx of appendectomy      Gastrostomy in place      Tracheostomy in place      Tracheostomy tube present      Feeding by G-tube        Allergies    codeine (Hives)    Intolerances      FAMILY HISTORY:  No pertinent family history in first degree relatives        Review of Systems:  Negative 2/2 to cognition       Medications:  meropenem  IVPB 1000 milliGRAM(s) IV Intermittent Once  trimethoprim   80 mG/sulfamethoxazole 400 mG 1 Tablet(s) Oral two times a day  vancomycin  IVPB      vancomycin  IVPB 750 milliGRAM(s) IV Intermittent once  vancomycin  IVPB 750 milliGRAM(s) IV Intermittent every 12 hours      albuterol    90 MICROgram(s) HFA Inhaler 2 Puff(s) Inhalation two times a day    LORazepam     Tablet 1 milliGRAM(s) Oral every 4 hours      heparin   Injectable 5000 Unit(s) SubCutaneous every 12 hours    pantoprazole  Injectable 40 milliGRAM(s) IV Push daily  polyethylene glycol 3350 17 Gram(s) Oral every 12 hours  senna 2 Tablet(s) Oral at bedtime      dextrose 50% Injectable 25 Gram(s) IV Push once  dextrose 50% Injectable 12.5 Gram(s) IV Push once  dextrose 50% Injectable 25 Gram(s) IV Push once  dextrose Oral Gel 15 Gram(s) Oral once PRN  glucagon  Injectable 1 milliGRAM(s) IntraMuscular once  insulin glargine Injectable (LANTUS) 8 Unit(s) SubCutaneous every morning  insulin lispro (ADMELOG) corrective regimen sliding scale   SubCutaneous three times a day before meals    dextrose 5%. 1000 milliLiter(s) IV Continuous <Continuous>  dextrose 5%. 1000 milliLiter(s) IV Continuous <Continuous>      chlorhexidine 0.12% Liquid 15 milliLiter(s) Oral Mucosa every 12 hours  chlorhexidine 4% Liquid 1 Application(s) Topical <User Schedule>    sodium zirconium cyclosilicate 10 Gram(s) Oral once      Mode: AC/ CMV (Assist Control/ Continuous Mandatory Ventilation)  RR (machine): 18  TV (machine): 400  FiO2: 100  PEEP: 5  ITime: 0.8  MAP: 21  PIP: 45      ICU Vital Signs Last 24 Hrs  T(C): 38.6 (2022 03:32), Max: 38.6 (2022 03:32)  T(F): 101.5 (2022 03:32), Max: 101.5 (2022 03:32)  HR: 102 (2022 03:45) (102 - 112)  BP: 129/79 (2022 03:32) (129/79 - 129/79)  BP(mean): --  ABP: --  ABP(mean): --  RR: 22 (2022 03:32) (22 - 22)  SpO2: 100% (2022 03:45) (97% - 100%)    O2 Parameters below as of 2022 03:32  Patient On (Oxygen Delivery Method): room air          Vital Signs Last 24 Hrs  T(C): 38.6 (2022 03:32), Max: 38.6 (2022 03:32)  T(F): 101.5 (2022 03:32), Max: 101.5 (2022 03:32)  HR: 102 (2022 03:45) (102 - 112)  BP: 129/79 (2022 03:32) (129/79 - 129/79)  BP(mean): --  RR: 22 (2022 03:32) (22 - 22)  SpO2: 100% (2022 03:45) (97% - 100%)    Parameters below as of 2022 03:32  Patient On (Oxygen Delivery Method): room air        ABG - ( 2022 04:09 )  pH, Arterial: 7.59  pH, Blood: x     /  pCO2: 36    /  pO2: 273   / HCO3: 34    / Base Excess: 12.8  /  SaO2: 99.8                I&O's Detail        LABS:                        9.6    16.01 )-----------( 290      ( 2022 03:56 )             31.7     11-    143  |  101  |  35<H>  ----------------------------<  124<H>  5.4<H>   |  31  |  1.25    Ca    9.4      2022 03:56    TPro  8.6<H>  /  Alb  2.6<L>  /  TBili  0.6  /  DBili  x   /  AST  41<H>  /  ALT  20  /  AlkPhos  128<H>  11-21          CAPILLARY BLOOD GLUCOSE        PT/INR - ( 2022 03:56 )   PT: 14.4 sec;   INR: 1.22 ratio         PTT - ( 2022 03:56 )  PTT:40.0 sec  Urinalysis Basic - ( 2022 04:25 )    Color: Yellow / Appearance: Clear / S.015 / pH: x  Gluc: x / Ketone: Trace  / Bili: Negative / Urobili: Negative mg/dL   Blood: x / Protein: 500 mg/dL / Nitrite: Negative   Leuk Esterase: Trace / RBC: x / WBC x   Sq Epi: x / Non Sq Epi: x / Bacteria: x      CULTURES:      Physical Examination:    General: Vented. No acute distress    Neuro: At baseline mental status, arousable, A O X1. Cn2-12 intact    HEENT: Pupils equal, reactive to light.  Symmetric.    PULM: Rhonchi heard B/l. No increased work of breathing     CVS: Regular rate and rhythm, no murmurs, rubs, or gallops    ABD: Soft, nondistended, nontender, normoactive bowel sounds, no masses    EXT: No edema, nontender    SKIN: Warm and well perfused, no rashes noted.

## 2022-11-21 NOTE — ED ADULT NURSE NOTE - NSICDXFAMILYHX_GEN_ALL_CORE_FT
How Severe Is Your Skin Lesion?: moderate Has Your Skin Lesion Been Treated?: not been treated Is This A New Presentation, Or A Follow-Up?: Skin Lesion FAMILY HISTORY:  No pertinent family history in first degree relatives

## 2022-11-21 NOTE — H&P ADULT - HISTORY OF PRESENT ILLNESS
77F with dementia, COPD with chronic respiratory failure s/p trach, cardiac arrest, CHH, quadriplegia, CVA, CKD, anemia, PEG tube, and multiple hospital admissions for respiratory distress presents to the ED BIBEMS from [facility] for diaphoresis and fever.   Patient unable to provide any history.  Patient was just discharged here from 11/12-11/16 for low grade fever, midline malfunction, leukocytosis, fever, concerning for sepsis possibly 2/2 unresolved VAP.  Was treated with meropenem.  Per notes from [facility], around 3am, patient was does "not look good" and was noted to be diaphoretic, tachypneic, and febrile to 100.9F.  Was sent here for further evaluation.  In the ED, patient was febrile to 101.5F and tachycardic to 112.  Labs showed leukocytosis of 16 (up from 6.5), hyperkalemia 5.4, ABG 7.59/36/273.  Patient is being readmitted for sepsis 2/2 presumably from VAP.

## 2022-11-21 NOTE — ED PROVIDER NOTE - CLINICAL SUMMARY MEDICAL DECISION MAKING FREE TEXT BOX
Patient with fever. On vent. Will get labs, start antibiotics. Patient will likely require admission to hospital

## 2022-11-21 NOTE — CONSULT NOTE ADULT - ASSESSMENT
77F with dementia, COPD with chronic respiratory failure s/p trach, cardiac arrest, CHH, quadriplegia, CVA, CKD, anemia, PEG tube, and multiple hospital admissions for respiratory distress presents to the ED BIBEMS from [facility] for diaphoresis and fever.     Patient was just discharged here from 11/12-11/16 for low grade fever, midline malfunction, leukocytosis, fever, concerning for sepsis possibly 2/2 recurent VAP.      Sepsis 2/2 Recurrent VAP  - febrile to 101.5F and tachycardic to 112 w/ leukocytosis of 16  - imaging reviewed  - infectious w/u pending  - sputum cx ordered  Plan:   Pt w/ hx of MDROs, becoming increasingly difficult to eradicate with current Abx  Most recent cx w/ Stenotrophomonas, S to Bactrim and Minocycline  Consider replacement of midline; can be possible source  C/w Bactrim, switch to IV 15mg/kg dosing - 285mg q8h  - bactrim will also provide MRSA coverage, can d/c vancomycin  Isolation per infection control policy  Vent management per ICU    Infectious Diseases will continue to follow. Please call with any questions.   Andressa Brantley M.D.  \[facility] [place] [facility]\"" Division of Infectious Diseases [phone]

## 2022-11-21 NOTE — DIETITIAN INITIAL EVALUATION ADULT - NS FNS DIET ORDER
Diet, NPO with Tube Feed:   Tube Feeding Modality: Gastrostomy  Glucerna 1.5 Horacio  Total Volume for 24 Hours (mL): 1000  Continuous  Starting Tube Feed Rate {mL per Hour}: 10  Increase Tube Feed Rate by (mL): 10     Every 10 hours  Until Goal Tube Feed Rate (mL per Hour): 50  Tube Feed Duration (in Hours): 20  Tube Feed Start Time: 17:00  Free Water Flush  Pump   Rate (mL per Hour): 25   Frequency: Every Hour    Duration (Hours): 24 (11-21-22 @ 05:10)

## 2022-11-21 NOTE — CONSULT NOTE ADULT - ASSESSMENT
Assessment   6 y/o Full code F with pmhx of dementia, COPD with chronic respiratory failure, S/p Trach and peg ( Vent Dependent), Cardiac arrest, quadriplegia, CVA, CKD, Anemia, and multiple admissions to hospital ( over 4 in past 6 weeks) including being discharged on 11/16 with VAP presents to ED from [facility] with fevers and abnormal labs. Elevated WBC with left shift, febrile, CXR shows worsening R lung infiltrates, appears to have septic shock and worsening VAP. Hx of MDRO with resistance to zosyn and susceptibility to bactrim.  Vented on 18/400/50%/5. S/p 30 cc/kg NS bolus with refractory hypotension reuuring levophed to maintain map > 65.  Pt is critically ill with high risk for acute deterioration and will be accepted to SPCU for active treatment of   1. Septic Shock   2. VAP  3. Acute on chronic hypoxic RF  4. Hyperkalemia  5. Transaminitis    Plan  Neuro: At baseline mental status. Ativan standing for agitation ordered.   Cardio: S/p 30 cc/kg of NS for sepsis. Started on levo to maintain MAP > 65. Add midodrine 10 mg TID to help facilitate weaning off pressors. Lactate 1.9.   Pulm: Vented. 18/400/50%/5. Titrating Fio2 to maintain Spo2 > 92 %. Lung protective TV 6-8 cc/kg/IBW. Keep Plateau pressure < 30. Bronchodilator.   GI: NPO. IV PPI. Bowel regimen. Transaminitis likely 2/2 to severe sepsis, will trend LFT, avoid hepatoxic agents.   Renal: Scr 1.25, baseline around 1.15, willl trend. Hyperkalemic with K+ 5.4, given 1 dose of lokelma and will repeat BMP. I/O's. Avoid nephrotoxic agents  ID: S/p Vanco and zosyn in ED. Hx of MDRO's with zosyn resistance, will start on meropenem. Hx of MDRO's resistant to bactrim started on bactrim as well. Elevated WBC with left shift, febrile, will trend. Bcx, UA, Ucx, Legionella urine, Strep pneumo urine, procal all pending. MRSA swab, if negative will d/c vanco. ID consult    Heme; Heparin for DVT prophylaxis   Endo: Restarted standing lantus 8 units in morning which she was on at last discharge on 11/16. ISS. Goal -180.   Dispo: Critically ill patient with septic shock 2/2 to VAP. Multiple admissions over past 2 months, poor prognosis, currently full code, palliative consult.       Critical Care time: 40 mins assessing presenting problems of acute illness that poses high probability of life threatening deterioration or end organ damage/dysfunction.  Medical decision making including Initiating plan of care, reviewing data, reviewing radiology, direct patient bedside evaluation and interpretation of vital signs, any necessary ventilator management , discussion with multidisciplinary team, discussing goals of care with patient/family, all non inclusive of procedures  Assessment   8 y/o Full code F with pmhx of dementia, COPD with chronic respiratory failure, S/p Trach and peg ( Vent Dependent), Cardiac arrest, quadriplegia, CVA, CKD, Anemia, and multiple admissions to hospital ( over 4 in past 6 weeks) including being discharged on 11/16 with VAP presents to ED from [facility] with fevers and abnormal labs. Elevated WBC with left shift, febrile, CXR shows worsening R lung infiltrates, appears to have septic shock and worsening VAP. Hx of MDRO with resistance to zosyn and susceptibility to bactrim.  Vented on 18/400/50%/5. S/p 30 cc/kg NS bolus with refractory hypotension reuuring levophed to maintain map > 65.  Pt is critically ill with high risk for acute deterioration and will be accepted to SPCU for active treatment of   1. Septic Shock   2. VAP  3. Acute on chronic hypoxic RF  4. Hyperkalemia  5. Transaminitis    Plan  Neuro: At baseline mental status. Ativan standing for agitation ordered.   Cardio: S/p 30 cc/kg of NS for sepsis. Started on levo to maintain MAP > 65. Add midodrine 10 mg TID to help facilitate weaning off pressors. Lactate 1.9.   Pulm: Vented. 18/400/50%/5. Titrating Fio2 to maintain Spo2 > 92 %. Lung protective TV 6-8 cc/kg/IBW. Keep Plateau pressure < 30. Bronchodilator.   GI: NPO. IV PPI. Bowel regimen. Transaminitis likely 2/2 to severe sepsis, will trend LFT, avoid hepatoxic agents.   Renal: Scr 1.25, baseline around 1.15, willl trend. Hyperkalemic with K+ 5.4, given 1 dose of lokelma and will repeat BMP. I/O's. Avoid nephrotoxic agents  ID: S/p Vanco and zosyn in ED. Hx of MDRO's with zosyn resistance, will start on meropenem. Hx of MDRO's resistant to bactrim started on bactrim as well. Elevated WBC with left shift, febrile, will trend. Bcx, UA, Ucx, Legionella urine, Strep pneumo urine, procal all pending. MRSA swab, if negative will d/c vanco. ID consult    Heme; Heparin for DVT prophylaxis   Endo: Restarted standing lantus 8 units in morning which she was on at last discharge on 11/16. ISS. Goal -180.   Dispo: Critically ill patient with septic shock 2/2 to VAP. Multiple admissions over past 2 months, poor prognosis, currently full code, palliative consult.       Critical Care time: 40 mins assessing presenting problems of acute illness that poses high probability of life threatening deterioration or end organ damage/dysfunction.  Medical decision making including Initiating plan of care, reviewing data, reviewing radiology, direct patient bedside evaluation and interpretation of vital signs, any necessary ventilator management , discussion with multidisciplinary team, discussing goals of care with patient/family, all non inclusive of procedures     Case discussed with and plan agreed to with eICU attending  Assessment   6 y/o Full code F with pmhx of dementia, COPD with chronic respiratory failure, S/p Trach and peg ( Vent Dependent), Cardiac arrest, quadriplegia, CVA, CKD, Anemia, and multiple admissions to hospital ( twice in past 6 weeks) including being discharged on 11/16 with VAP presents to ED from [facility] with fevers and abnormal labs. Elevated WBC with left shift, febrile, CXR shows worsening R lung infiltrates, appears to have septic shock and worsening VAP. Hx of MDRO with resistance to zosyn and susceptibility to bactrim.  Vented on 18/400/50%/5. S/p 30 cc/kg NS bolus with refractory hypotension reuuring levophed to maintain map > 65.  Pt is critically ill with high risk for acute deterioration and will be accepted to SPCU for active treatment of   1. Septic Shock   2. VAP  3. Acute on chronic hypoxic RF  4. Hyperkalemia  5. Transaminitis    Plan  Neuro: At baseline mental status. Ativan standing for agitation ordered.   Cardio: S/p 30 cc/kg of NS for sepsis. Started on levo to maintain MAP > 65. Add midodrine 10 mg TID to help facilitate weaning off pressors. Lactate 1.9.   Pulm: Vented. 18/400/50%/5. Titrating Fio2 to maintain Spo2 > 92 %. Lung protective TV 6-8 cc/kg/IBW. Keep Plateau pressure < 30. Bronchodilator.   GI: NPO. IV PPI. Bowel regimen. Transaminitis likely 2/2 to severe sepsis, will trend LFT, avoid hepatoxic agents.   Renal: Scr 1.25, baseline around 1.15, willl trend. Hyperkalemic with K+ 5.4, given 1 dose of lokelma and will repeat BMP. I/O's. Avoid nephrotoxic agents  ID: S/p Vanco and zosyn in ED. Hx of MDRO's with zosyn resistance, will start on meropenem. Hx of MDRO's resistant to bactrim started on bactrim as well. Elevated WBC with left shift, febrile, will trend. Bcx, UA, Ucx, Legionella urine, Strep pneumo urine, procal all pending. MRSA swab, if negative will d/c vanco. ID consult    Heme; Heparin for DVT prophylaxis   Endo: Restarted standing lantus 8 units in morning which she was on at last discharge on 11/16. ISS. Goal -180.   Dispo: Critically ill patient with septic shock 2/2 to VAP. Multiple admissions over past 2 months, poor prognosis, currently full code, palliative consult.       Critical Care time: 40 mins assessing presenting problems of acute illness that poses high probability of life threatening deterioration or end organ damage/dysfunction.  Medical decision making including Initiating plan of care, reviewing data, reviewing radiology, direct patient bedside evaluation and interpretation of vital signs, any necessary ventilator management , discussion with multidisciplinary team, discussing goals of care with patient/family, all non inclusive of procedures     Case discussed with and plan agreed to with eICU attending  Assessment   6 y/o Full code F with pmhx of dementia, COPD with chronic respiratory failure, S/p Trach and peg ( Vent Dependent), Cardiac arrest, quadriplegia, CVA, CKD, Anemia, and multiple admissions to hospital ( twice in past 6 weeks) including being discharged on 11/16 with VAP presents to ED from [facility] with fevers and abnormal labs. Elevated WBC with left shift, febrile, CXR shows worsening R lung infiltrates, appears to have septic shock and worsening VAP. Hx of MDRO with resistance to zosyn and susceptibility to bactrim.  Vented on 18/400/50%/5. S/p 30 cc/kg NS bolus with refractory hypotension reuuring levophed to maintain map > 65.  Pt is critically ill with high risk for acute deterioration and will be accepted to SPCU for active treatment of   1. Septic Shock   2. VAP  3. Acute on chronic hypoxic RF  4. Hyperkalemia  5. Transaminitis    Plan  Neuro: At baseline mental status. Ativan standing for agitation ordered.   Cardio: S/p 30 cc/kg of NS for sepsis. Started on levo to maintain MAP > 65. Add midodrine 10 mg TID to help facilitate weaning off pressors. Lactate 1.9.   Pulm: Vented. 18/400/50%/5. Titrating Fio2 to maintain Spo2 > 92 %. Lung protective TV 6-8 cc/kg/IBW. Keep Plateau pressure < 30. Bronchodilator.   GI: NPO. IV PPI. Bowel regimen. Transaminitis likely 2/2 to severe sepsis, will trend LFT, avoid hepatoxic agents.   Renal: Scr 1.25, baseline around 1.15, willl trend. Hyperkalemic with K+ 5.4, given 1 dose of lokelma and will repeat BMP. I/O's. Avoid nephrotoxic agents  ID: S/p Vanco and zosyn in ED. Hx of MDRO's with zosyn resistance, will start on meropenem. Hx of MDRO's resistant to bactrim started on bactrim as well. Elevated WBC with left shift, febrile, will trend. Bcx, UA, Ucx, Legionella urine, Strep pneumo urine, procal all pending. MRSA swab, if negative will d/c vanco. ID consult    Heme; Heparin for DVT prophylaxis   Endo: Restarted standing lantus 8 units in morning which she was on at last discharge on 11/16. ISS. Goal -180.   Dispo: Critically ill patient with septic shock 2/2 to VAP. Multiple admissions over past 2 months, poor prognosis, currently full code, palliative consult.       Critical Care time: 40 mins assessing presenting problems of acute illness that poses high probability of life threatening deterioration or end organ damage/dysfunction.  Medical decision making including Initiating plan of care, reviewing data, reviewing radiology, direct patient bedside evaluation and interpretation of vital signs, any necessary ventilator management , discussion with multidisciplinary team, discussing goals of care with patient/family, all non inclusive of procedures     Case discussed with and plan agreed to with eICU attending.

## 2022-11-21 NOTE — H&P ADULT - NSHPPHYSICALEXAM_GEN_ALL_CORE
PHYSICAL EXAM:  Vital Signs Last 24 Hrs  T(C): 38.6 (21 Nov 2022 03:32), Max: 38.6 (21 Nov 2022 03:32)  T(F): 101.5 (21 Nov 2022 03:32), Max: 101.5 (21 Nov 2022 03:32)  HR: 79 (21 Nov 2022 05:09) (79 - 112)  BP: 91/47 (21 Nov 2022 05:09) (91/47 - 129/79)  BP(mean): --  RR: 19 (21 Nov 2022 05:09) (19 - 30)  SpO2: 99% (21 Nov 2022 05:09) (97% - 100%)    Parameters below as of 21 Nov 2022 03:32  Patient On (Oxygen Delivery Method): room air    GENERAL:    thin, cachectic appearing female, vented  HEAD:     atraumatic, normocephalic  EYES:     EOMI, dry conjunctiva  ENT:  trach in place  RESPIRATORY:     coarse vented breath sounds but no gross crackles anteriorly  CARDIOVASCULAR:     regular rate and rhythm, no murmurs or rubs or gallops, 2+ peripheral pulses  GASTROINTESTINAL:     soft, nontender, nondistended, +PEG in place, no gross deformities  EXTREMITIES:     no clubbing or cyanosis or edema  MUSCULOSKELETAL:     contracted extremities throughout  SKIN:     left buttock stage III, sacral with redness  PSYCH:     non-responsive

## 2022-11-21 NOTE — CONSULT NOTE ADULT - ASSESSMENT
REVIEW OF SYMPTOMS      Able to give (reliable) ROS  NO     PHYSICAL EXAM    HEENT Unremarkable  atraumatic   RESP Fair air entry EXP prolonged    Harsh breath sound Resp distres mild   CARDIAC S1 S2 No S3     NO JVD    ABDOMEN SOFT BS PRESENT NOT DISTENDED No hepatosplenomegaly   PEDAL EDEMA present No calf tenderness  NO rash       AGE/SEX.   77 f  DOA.  11/21/2022   CC .      SPECIFIC PMH  -- Pleural effsn  Past ritchie   R THORAC   6/8/2022  g 161 l 222 p 4.9 p 10 l 16 .38    L THORAC   5/25/2022 g 183 ldh 144/381 .37 prot  3.4/5.3 .64 po 23 ly 36   5/25/2022l pl fluid  lymph pred exudate   cyto (-)     Ct ch 10/18/2022 sm r mod l effs large subpulmonic component   echo 8/19/2022 dd1 n lvsf mod ph pasp 58       GENERAL DATA .   [facility].  full code      ALLGY.     codeine                        WT. 11/21/2022 57  BMI.    11/21/2022 21                          ICU STAY.   admitted ICU 11/21/2022  COVID.  Scv2 11/21/2022 scv2 (-)    PROCEDURE.  11/21/2022 rij central line    BEST PRACTICE ISSUES.    HOB ELEVATN. Yes  DVT PPLX.  11/21/2022 hpsc    SQUIRES PPLX.    11/21/2022 protonix 40   INFN PPLX.    11/21/2022 chlorhexidine .12% bid (mrsa)   11/21/2022 chlorhexidine 2% (c li)   SP SW EM.         DIET.  11/21/2022 glucerna 1.5 1000 gt                 VS/ PO/IO/ VENT/ DRIPS.   11/21/2022 64 114/60   11/21/2022 18/350/5/.5         PATIENT PRESENTATION .  76 f PMH trach peg cva cac anoxic encephalo PH 8/19/2022 pasp 58 bl pl effs  r thora 6/8/2022 and l thora 5/25/2022 were lp exudates  recurrent hospitalizations HO CR psuedomonas 5/2/2022 zosyn 8/19/2022  recent admission 10/18-11/2/2022 uc 10/18 100K E coli  sp 10/19 Stenotrophomonas  10/19-10/26/2022 levaquin 750  10/21/2022 rocephin x 7d Dr BRITTANY Brantley readmitted recently  11/4-11/10/2022 with fever trach 11/5 stenotrophomonas   uc 11/4 vr enterococc 50-99 candida   11/4/2022 levaquin 750 dced 11/7 doxy  11/8 bactrim 250.3    RECENT ADMISSIONS.  11/12-11/16/2022 11/4-11/10/2022  10/18-11/2/2022 8/18-8/26/2022     CURRENT ADMISSION  Pt sent back from [facility] 11/21/2022 with fever abn labs Found yto be in shock Norepi started 11/21/2022   Pulm crit care consulted      PROBLEMS.  Vent dependent   Trach poa 11/21/2022    RIJ 11/21/2022     VAP 11/21/2022  Decub ulcers   Bactrim tid  11/21/2022    COPD    Shock   Norepi 11/21/2022  midodrine 11/21/2022     peg poa 11/21/2022    ANEMIA       ASSESSMENT/RECOMMENDATIONS .   RESP.  .. Gas exchange.  11/21/2022 4a On 100% vent 759/36/273   Monitor & target po 90-95%  .. VENT MANAGEMENT.   HOB elevation  Target Pplat 30 (-)  Target PO 90-95%  Target pH 730 (+)  Daily spontaneous breathing trials   Daily sedation vacation   .. Pleural effsn .   CXR 11/21/2022 r gr than l effsn  .. Bronchospasm.  11/21/2022 albut hfa 2p bid   INFECTION.  .. SCV2 status.  Scv2 11/21/2022 scv2 (-)  .. PAST MICROBIO/ABIO.  sp 10/19 Stenotrophomonas    10/19-10/26/2022 levaquin   trach 11/5 stenotrophomonas   uc 11/4 vr enterococc 50-99 candida   11/4/2022 levaquin 750 dced   11/7 doxy    11/8 bactrim 250.3  .. VAP   w 11/21/2022 w 16   Pr 11/21/2022 pr 1.8   ua 11/21/2022 w 3-5   rvp 11/21/2022 (-)   rsv 11/21/2022 (-)   11/21/2022 bactrim 285 mg trimeth q 8 h Dr BRITTANY Brantley   CARDIAC.  .. Shock.    11/21/2022 midodrine 10.3   11/21/2022 5a norepi 8 in 250   target map 65 (+)   .. ekg changes.  ekg 11/21/2022 s tach shoirt pr qtc 470 possible rvh   GI.  .. Nutrition.   11/21/2022 glucerna 1.5 1000 gt    .. elevated lfts   lfts 11/21/2022  ap 128  ast 41  alt 20   .. constipatn.  11/21/2022 miralax  11/21/2022 senna   HEMAT.  .. DVT pplx.   11/21/2022 hpsc    .. anemia.  Hb 11/21/2022 Hb 9.6   mcv 11/21/2022 mcv 90   inr 11/21/2022 inr 122   monitor target Hb 7 (+)   RENAL.  .. Hyperkalemia.  Na 11/21/2022 Na 143   K 11/21/2022 K 5.4   CO2 11/21/2022 co2 31   Cr 11/21/2022 Cr 1.2   ag 11/21/2022 ag 11  alb 11/21/2022 alb 2.8   11/21/2022 lokelma 10 givn   IV fl.   ENDOCRINE.   .. DM   11/21/2022 glarg 8 q am   11/21/2022 riss   NEURO.  .. seizures.  11/21/2022 lorazepam 1.6     TIME SPENT   Over 55 minutes aggregate critical care time spent on encounter; activities included   direct patient care, counseling and/or coordinating care reviewing notes, lab data/ imaging , discussion with multidisciplinary team/ patient  /family and explaining in detail risks, benefits, alternatives  of the recommendations     CHAPINCITO GUIDRY 76 f [address] 11/21/2022   DR JOSEPH DU

## 2022-11-21 NOTE — ED PROVIDER NOTE - OBJECTIVE STATEMENT
Patient sent from [facility] with report of fever and abnormal labs.  EMS unable to specify patient's temperature or which labs were abnormal, as they state they were not given details by the staff at the nursing home.  Patient is intubated and nonverbal unable to contribute to history.

## 2022-11-21 NOTE — ED ADULT NURSE NOTE - CHIEF COMPLAINT QUOTE
PT BIB EMS from [facility] c/o fever and abnormal labs; pt chronic trach to vent

## 2022-11-21 NOTE — ED ADULT NURSE NOTE - OBJECTIVE STATEMENT
PT BIB EMS from [facility] c/o fever and abnormal labs; pt chronic trach to vent

## 2022-11-21 NOTE — CONSULT NOTE ADULT - ASSESSMENT
77F with dementia, COPD with chronic respiratory failure s/p trach, cardiac arrest, CHH, quadriplegia, CVA, CKD, anemia, PEG tube, and multiple hospital admissions for respiratory distress presents to the ED BIBEMS from [facility] for diaphoresis and fever.    77F with dementia, COPD with chronic respiratory failure s/p trach, cardiac arrest, CHH, quadriplegia, CVA, CKD, anemia, PEG tube, and multiple hospital admissions for respiratory distress presents to the ED BIBEMS from [facility] for diaphoresis and fever.       GOC   pt is full code   is full code  assist with needs -   oral hygiene  skin care  recurrent admissions  long term resident of SNF  vent dep  anoxic brain injury - dementia - vegetative state

## 2022-11-21 NOTE — H&P ADULT - NSHPLABSRESULTS_GEN_ALL_CORE
LABS:                        9.6<L>  16.01<H> )-----------( 290      ( 2022 03:56 )             31.7<L>    143    |  101    |  35<H>  ----------------------------<  124<H>    2022 03:56  5.4<H>   |  31     |  1.25         Ca 9.4           2022 03:56        TPro  8.6<H>  /  Alb  2.6<L>  /  TBili  0.6    /  DBili  x      /  AST  41<H>  /  ALT  20     /  AlkPhos  128<H>  2022 03:56    PT/INR - ( 2022 03:56 )   PT: 14.4<H>;   INR: 1.22<H>         PTT - ( 2022 03:56 )  PTT:40.0<H>    Urinalysis Basic - ( 2022 04:25 )    Color: Yellow / Appearance: Clear / S.015 / pH: x  Gluc: x / Ketone: Trace  / Bili: Negative / Urobili: Negative mg/dL   Blood: x / Protein: 500 mg/dL / Nitrite: Negative   Leuk Esterase: Trace / RBC: 6-10 /HPF / WBC 3-5   Sq Epi: x / Non Sq Epi: Many / Bacteria: Few    Troponin trend:    Lactate, Blood: 1.9 mmol/L (22 @ 03:56)    Radiology:    CXR - possibly right sided infiltrate, questionable improvement from last xray

## 2022-11-21 NOTE — ED ADULT NURSE NOTE - READER
The labs were reviewed by me. Imaging was reviewed by me. EKG was reviewed by me.                        7.8    11.27 )-----------( 517      ( 04 Apr 2019 13:55 )             27.7       04-04    131<L>  |  97<L>  |  15  ----------------------------<  154<H>  4.2   |  24  |  0.85    Ca    11.8<H>      04 Apr 2019 13:55    TPro  8.2  /  Alb  3.2<L>  /  TBili  0.4  /  DBili  x   /  AST  24  /  ALT  21  /  AlkPhos  237<H>  04-04            EKG: NSR, ; no ST changes or TWIs     RADIOLOGY:   none No

## 2022-11-21 NOTE — ED PROVIDER NOTE - ENMT, MLM
Airway patent, Nasal mucosa clear. Mouth with dry mucosa. Throat has no vesicles, no oropharyngeal exudates and uvula is midline. Poor dentition

## 2022-11-21 NOTE — CONSULT NOTE ADULT - ASSESSMENT
The patient is a 77 year old female with a history of HTN, DM, CVA, dementia, chronic respiratory failure s/p trach, PEG, cardiac arrest, anemia, pleural effusions who presents with fever and respiratory distress.    Plan:  - Echo 8/22 with normal LV systolic function, mod pulm HTN, IVC small/collapsible  - CXR with diffuse lung infiltrates  - Continue midodrine 10 mg tid  - On norepinephrine  - IV antibiotics  - Pulm and ID follow-up  - Comfort care would be most appropriate

## 2022-11-21 NOTE — ED CLERICAL - NS ED CLERK NOTE PRE-ARRIVAL INFORMATION; ADDITIONAL PRE-ARRIVAL INFORMATION
This patient is enrolled in [facility]  readmission reduction initiative and has active care navigation. This patient can be followed up by the care navigation team within 24 hours.  To arrange close follow-up or to obtain additional clinical information, please call the care navigation contact number above.

## 2022-11-21 NOTE — CONSULT NOTE ADULT - SUBJECTIVE AND OBJECTIVE BOX
Date/Time Patient Seen:  		  Referring MD:   Data Reviewed	       Patient is a 77y old  Female who presents with a chief complaint of diaphoresis and fever (21 Nov 2022 04:55)      Subjective/HPI   77F with dementia, COPD with chronic respiratory failure s/p trach, cardiac arrest, CHH, quadriplegia, CVA, CKD, anemia, PEG tube, and multiple hospital admissions for respiratory distress presents to the ED BIBEMS from [facility] for diaphoresis and fever.   Patient unable to provide any history.  Patient was just discharged here from 11/12-11/16 for low grade fever, midline malfunction, leukocytosis, fever, concerning for sepsis possibly 2/2 unresolved VAP.  Was treated with meropenem.  Per notes from [facility], around 3am, patient was does "not look good" and was noted to be diaphoretic, tachypneic, and febrile to 100.9F.  Was sent here for further evaluation.  In the ED, patient was febrile to 101.5F and tachycardic to 112.  Labs showed leukocytosis of 16 (up from 6.5), hyperkalemia 5.4, ABG 7.59/36/273.  Patient is being readmitted for sepsis 2/2 presumably from VAP.    PAST MEDICAL & SURGICAL HISTORY:  Dementia of frontal lobe type    Aphasic stroke    Diabetes mellitus    Respiratory failure    Hypertension    GERD (gastroesophageal reflux disease)    Constipation    Respiratory failure    CVA (cerebral vascular accident)    HTN (hypertension)    DM (diabetes mellitus)    Advanced dementia    COVID-19 virus detected    Quadriplegia    Pneumonia    Type II diabetes mellitus    Hx of appendectomy    Gastrostomy in place    Tracheostomy in place    Tracheostomy tube present    Feeding by G-tube      FAMILY HISTORY:  No pertinent family history in first degree relatives. No pertinent family history of: asthma.     Social History:  · Substance use	No  · Social History (marital status, living situation, occupation, and sexual history)	lives at [facility]     Tobacco Screening:  · Core Measure Site	No  · Has the patient used tobacco in the past 30 days?	No    Risk Assessment:    Present on Admission:  Deep Venous Thrombosis	no  Pulmonary Embolus	no     HIV Screening:  · In accordance with [place] State law, we offer every patient who comes to our ED an HIV test. Would you like to be tested today?	Unable to answer due to medical condition/unresponsive/etc...        Medication list         MEDICATIONS  (STANDING):  albuterol    90 MICROgram(s) HFA Inhaler 2 Puff(s) Inhalation two times a day  chlorhexidine 0.12% Liquid 15 milliLiter(s) Oral Mucosa every 12 hours  dextrose 5%. 1000 milliLiter(s) (50 mL/Hr) IV Continuous <Continuous>  dextrose 5%. 1000 milliLiter(s) (100 mL/Hr) IV Continuous <Continuous>  dextrose 50% Injectable 25 Gram(s) IV Push once  dextrose 50% Injectable 12.5 Gram(s) IV Push once  dextrose 50% Injectable 25 Gram(s) IV Push once  glucagon  Injectable 1 milliGRAM(s) IntraMuscular once  heparin   Injectable 5000 Unit(s) SubCutaneous every 12 hours  insulin glargine Injectable (LANTUS) 8 Unit(s) SubCutaneous every morning  insulin lispro (ADMELOG) corrective regimen sliding scale   SubCutaneous three times a day before meals  LORazepam     Tablet 1 milliGRAM(s) Oral every 4 hours  midodrine 10 milliGRAM(s) Oral every 8 hours  norepinephrine Infusion 0.05 MICROgram(s)/kG/Min (5.35 mL/Hr) IV Continuous <Continuous>  pantoprazole  Injectable 40 milliGRAM(s) IV Push daily  polyethylene glycol 3350 17 Gram(s) Oral every 12 hours  senna 2 Tablet(s) Oral at bedtime  trimethoprim   80 mG/sulfamethoxazole 400 mG 1 Tablet(s) Oral two times a day  vancomycin  IVPB      vancomycin  IVPB 750 milliGRAM(s) IV Intermittent every 12 hours    MEDICATIONS  (PRN):  dextrose Oral Gel 15 Gram(s) Oral once PRN Blood Glucose LESS THAN 70 milliGRAM(s)/deciliter         Vitals log        ICU Vital Signs Last 24 Hrs  T(C): 37.2 (21 Nov 2022 05:43), Max: 38.6 (21 Nov 2022 03:32)  T(F): 99 (21 Nov 2022 05:43), Max: 101.5 (21 Nov 2022 03:32)  HR: 74 (21 Nov 2022 06:13) (70 - 112)  BP: 96/51 (21 Nov 2022 06:13) (90/52 - 129/79)  BP(mean): --  ABP: --  ABP(mean): --  RR: 18 (21 Nov 2022 06:13) (18 - 30)  SpO2: 98% (21 Nov 2022 06:13) (97% - 100%)    O2 Parameters below as of 21 Nov 2022 03:32  Patient On (Oxygen Delivery Method): room air             Mode: AC/ CMV (Assist Control/ Continuous Mandatory Ventilation)  RR (machine): 18  TV (machine): 400  FiO2: 100  PEEP: 5  ITime: 0.8  MAP: 21  PIP: 45      Input and Output:  I&O's Detail      Lab Data                        9.6    16.01 )-----------( 290      ( 21 Nov 2022 03:56 )             31.7     11-21    143  |  101  |  35<H>  ----------------------------<  124<H>  5.4<H>   |  31  |  1.25    Ca    9.4      21 Nov 2022 03:56    TPro  8.6<H>  /  Alb  2.6<L>  /  TBili  0.6  /  DBili  x   /  AST  41<H>  /  ALT  20  /  AlkPhos  128<H>  11-21    ABG - ( 21 Nov 2022 04:09 )  pH, Arterial: 7.59  pH, Blood: x     /  pCO2: 36    /  pO2: 273   / HCO3: 34    / Base Excess: 12.8  /  SaO2: 99.8                    Review of Systems	  ventilated    Objective     Physical Examination        Pertinent Lab findings & Imaging      Woody:  NO   Adequate UO     I&O's Detail           Discussed with:     Cultures:	        Radiology      ACC: [id number] EXAM:  XR CHEST AP OR PA 1V                          PROCEDURE DATE:  11/12/2022          INTERPRETATION:  Chest AP portable        CLINICAL HISTORY: Respiratory distress, sepsis    COMPARISON: 11/8/2022    FINDINGS: Diffuse bilateral lung infiltrates are again apparent with   associated right-sided pleural effusion. The overall pattern is not   significantly changed.    Tracheostomy is in place.    No change in the heart or mediastinal configuration allowing for   technique.    IMPRESSION: Limited portable examination with diffuse lung infiltrates.   No significant change.    --- End of Report ---            JOSHUA JAMES MD; Attending Radiologist  This document has been electronically signed. Nov 12 2022  1:13PM                         Date/Time Patient Seen:  		  Referring MD:   Data Reviewed	       Patient is a 77y old  Female who presents with a chief complaint of diaphoresis and fever (21 Nov 2022 04:55)      Subjective/HPI  vs noted  labs reviewed  H and P reviewed  ER provider note reviewed  imaging reviewed     77F with dementia, COPD with chronic respiratory failure s/p trach, cardiac arrest, CHH, quadriplegia, CVA, CKD, anemia, PEG tube, and multiple hospital admissions for respiratory distress presents to the ED BIBEMS from [facility] for diaphoresis and fever.   Patient unable to provide any history.  Patient was just discharged here from 11/12-11/16 for low grade fever, midline malfunction, leukocytosis, fever, concerning for sepsis possibly 2/2 unresolved VAP.  Was treated with meropenem.  Per notes from [facility], around 3am, patient was does "not look good" and was noted to be diaphoretic, tachypneic, and febrile to 100.9F.  Was sent here for further evaluation.  In the ED, patient was febrile to 101.5F and tachycardic to 112.  Labs showed leukocytosis of 16 (up from 6.5), hyperkalemia 5.4, ABG 7.59/36/273.  Patient is being readmitted for sepsis 2/2 presumably from VAP.    PAST MEDICAL & SURGICAL HISTORY:  Dementia of frontal lobe type    Aphasic stroke    Diabetes mellitus    Respiratory failure    Hypertension    GERD (gastroesophageal reflux disease)    Constipation    Respiratory failure    CVA (cerebral vascular accident)    HTN (hypertension)    DM (diabetes mellitus)    Advanced dementia    COVID-19 virus detected    Quadriplegia    Pneumonia    Type II diabetes mellitus    Hx of appendectomy    Gastrostomy in place    Tracheostomy in place    Tracheostomy tube present    Feeding by G-tube      FAMILY HISTORY:  No pertinent family history in first degree relatives. No pertinent family history of: asthma.     Social History:  · Substance use	No  · Social History (marital status, living situation, occupation, and sexual history)	lives at [facility]     Tobacco Screening:  · Core Measure Site	No  · Has the patient used tobacco in the past 30 days?	No    Risk Assessment:    Present on Admission:  Deep Venous Thrombosis	no  Pulmonary Embolus	no     HIV Screening:  · In accordance with [place] law, we offer every patient who comes to our ED an HIV test. Would you like to be tested today?	Unable to answer due to medical condition/unresponsive/etc...        Medication list         MEDICATIONS  (STANDING):  albuterol    90 MICROgram(s) HFA Inhaler 2 Puff(s) Inhalation two times a day  chlorhexidine 0.12% Liquid 15 milliLiter(s) Oral Mucosa every 12 hours  dextrose 5%. 1000 milliLiter(s) (50 mL/Hr) IV Continuous <Continuous>  dextrose 5%. 1000 milliLiter(s) (100 mL/Hr) IV Continuous <Continuous>  dextrose 50% Injectable 25 Gram(s) IV Push once  dextrose 50% Injectable 12.5 Gram(s) IV Push once  dextrose 50% Injectable 25 Gram(s) IV Push once  glucagon  Injectable 1 milliGRAM(s) IntraMuscular once  heparin   Injectable 5000 Unit(s) SubCutaneous every 12 hours  insulin glargine Injectable (LANTUS) 8 Unit(s) SubCutaneous every morning  insulin lispro (ADMELOG) corrective regimen sliding scale   SubCutaneous three times a day before meals  LORazepam     Tablet 1 milliGRAM(s) Oral every 4 hours  midodrine 10 milliGRAM(s) Oral every 8 hours  norepinephrine Infusion 0.05 MICROgram(s)/kG/Min (5.35 mL/Hr) IV Continuous <Continuous>  pantoprazole  Injectable 40 milliGRAM(s) IV Push daily  polyethylene glycol 3350 17 Gram(s) Oral every 12 hours  senna 2 Tablet(s) Oral at bedtime  trimethoprim   80 mG/sulfamethoxazole 400 mG 1 Tablet(s) Oral two times a day  vancomycin  IVPB      vancomycin  IVPB 750 milliGRAM(s) IV Intermittent every 12 hours    MEDICATIONS  (PRN):  dextrose Oral Gel 15 Gram(s) Oral once PRN Blood Glucose LESS THAN 70 milliGRAM(s)/deciliter         Vitals log        ICU Vital Signs Last 24 Hrs  T(C): 37.2 (21 Nov 2022 05:43), Max: 38.6 (21 Nov 2022 03:32)  T(F): 99 (21 Nov 2022 05:43), Max: 101.5 (21 Nov 2022 03:32)  HR: 74 (21 Nov 2022 06:13) (70 - 112)  BP: 96/51 (21 Nov 2022 06:13) (90/52 - 129/79)  BP(mean): --  ABP: --  ABP(mean): --  RR: 18 (21 Nov 2022 06:13) (18 - 30)  SpO2: 98% (21 Nov 2022 06:13) (97% - 100%)    O2 Parameters below as of 21 Nov 2022 03:32  Patient On (Oxygen Delivery Method): room air             Mode: AC/ CMV (Assist Control/ Continuous Mandatory Ventilation)  RR (machine): 18  TV (machine): 400  FiO2: 100  PEEP: 5  ITime: 0.8  MAP: 21  PIP: 45      Input and Output:  I&O's Detail      Lab Data                        9.6    16.01 )-----------( 290      ( 21 Nov 2022 03:56 )             31.7     11-21    143  |  101  |  35<H>  ----------------------------<  124<H>  5.4<H>   |  31  |  1.25    Ca    9.4      21 Nov 2022 03:56    TPro  8.6<H>  /  Alb  2.6<L>  /  TBili  0.6  /  DBili  x   /  AST  41<H>  /  ALT  20  /  AlkPhos  128<H>  11-21    ABG - ( 21 Nov 2022 04:09 )  pH, Arterial: 7.59  pH, Blood: x     /  pCO2: 36    /  pO2: 273   / HCO3: 34    / Base Excess: 12.8  /  SaO2: 99.8                    Review of Systems	  ventilated    Objective     Physical Examination    heart s1s2  lung dec BS  head nc  trach      Pertinent Lab findings & Imaging      Woody:  NO   Adequate UO     I&O's Detail           Discussed with:     Cultures:	        Radiology      ACC: [id number] EXAM:  XR CHEST AP OR PA 1V                          PROCEDURE DATE:  11/12/2022          INTERPRETATION:  Chest AP portable        CLINICAL HISTORY: Respiratory distress, sepsis    COMPARISON: 11/8/2022    FINDINGS: Diffuse bilateral lung infiltrates are again apparent with   associated right-sided pleural effusion. The overall pattern is not   significantly changed.    Tracheostomy is in place.    No change in the heart or mediastinal configuration allowing for   technique.    IMPRESSION: Limited portable examination with diffuse lung infiltrates.   No significant change.    --- End of Report ---            JOSHUA JAMES MD; Attending Radiologist  This document has been electronically signed. Nov 12 2022  1:13PM

## 2022-11-22 LAB
ALBUMIN SERPL ELPH-MCNC: 1.8 G/DL — LOW (ref 3.3–5)
ALP SERPL-CCNC: 107 U/L — SIGNIFICANT CHANGE UP (ref 30–120)
ALT FLD-CCNC: 12 U/L DA — SIGNIFICANT CHANGE UP (ref 10–60)
ANION GAP SERPL CALC-SCNC: 5 MMOL/L — SIGNIFICANT CHANGE UP (ref 5–17)
AST SERPL-CCNC: 25 U/L — SIGNIFICANT CHANGE UP (ref 10–40)
BILIRUB SERPL-MCNC: 0.4 MG/DL — SIGNIFICANT CHANGE UP (ref 0.2–1.2)
BUN SERPL-MCNC: 25 MG/DL — HIGH (ref 7–23)
CALCIUM SERPL-MCNC: 7.7 MG/DL — LOW (ref 8.4–10.5)
CHLORIDE SERPL-SCNC: 102 MMOL/L — SIGNIFICANT CHANGE UP (ref 96–108)
CO2 SERPL-SCNC: 30 MMOL/L — SIGNIFICANT CHANGE UP (ref 22–31)
CREAT SERPL-MCNC: 0.99 MG/DL — SIGNIFICANT CHANGE UP (ref 0.5–1.3)
CULTURE RESULTS: SIGNIFICANT CHANGE UP
EGFR: 58 ML/MIN/1.73M2 — LOW
GLUCOSE SERPL-MCNC: 173 MG/DL — HIGH (ref 70–99)
GRAM STN FLD: SIGNIFICANT CHANGE UP
HCT VFR BLD CALC: 25.9 % — LOW (ref 34.5–45)
HGB BLD-MCNC: 7.8 G/DL — LOW (ref 11.5–15.5)
INR BLD: 1.35 RATIO — HIGH (ref 0.88–1.16)
LACTATE SERPL-SCNC: 1.2 MMOL/L — SIGNIFICANT CHANGE UP (ref 0.7–2)
MCHC RBC-ENTMCNC: 27.5 PG — SIGNIFICANT CHANGE UP (ref 27–34)
MCHC RBC-ENTMCNC: 30.1 GM/DL — LOW (ref 32–36)
MCV RBC AUTO: 91.2 FL — SIGNIFICANT CHANGE UP (ref 80–100)
NRBC # BLD: 0 /100 WBCS — SIGNIFICANT CHANGE UP (ref 0–0)
PHOSPHATE SERPL-MCNC: 2.9 MG/DL — SIGNIFICANT CHANGE UP (ref 2.5–4.5)
PLATELET # BLD AUTO: 191 K/UL — SIGNIFICANT CHANGE UP (ref 150–400)
POTASSIUM SERPL-MCNC: 3.7 MMOL/L — SIGNIFICANT CHANGE UP (ref 3.5–5.3)
POTASSIUM SERPL-SCNC: 3.7 MMOL/L — SIGNIFICANT CHANGE UP (ref 3.5–5.3)
PROCALCITONIN SERPL-MCNC: 1.19 NG/ML — HIGH (ref 0.02–0.1)
PROT SERPL-MCNC: 6.4 G/DL — SIGNIFICANT CHANGE UP (ref 6–8.3)
PROTHROM AB SERPL-ACNC: 16 SEC — HIGH (ref 10.5–13.4)
RBC # BLD: 2.84 M/UL — LOW (ref 3.8–5.2)
RBC # FLD: 18.1 % — HIGH (ref 10.3–14.5)
SODIUM SERPL-SCNC: 137 MMOL/L — SIGNIFICANT CHANGE UP (ref 135–145)
SPECIMEN SOURCE: SIGNIFICANT CHANGE UP
SPECIMEN SOURCE: SIGNIFICANT CHANGE UP
TROPONIN I, HIGH SENSITIVITY RESULT: 28.5 NG/L — SIGNIFICANT CHANGE UP
WBC # BLD: 11.71 K/UL — HIGH (ref 3.8–10.5)
WBC # FLD AUTO: 11.71 K/UL — HIGH (ref 3.8–10.5)

## 2022-11-22 PROCEDURE — 99232 SBSQ HOSP IP/OBS MODERATE 35: CPT

## 2022-11-22 PROCEDURE — 99233 SBSQ HOSP IP/OBS HIGH 50: CPT

## 2022-11-22 RX ORDER — POVIDONE-IODINE 5 %
1 AEROSOL (ML) TOPICAL DAILY
Refills: 0 | Status: DISCONTINUED | OUTPATIENT
Start: 2022-11-22 | End: 2022-12-08

## 2022-11-22 RX ORDER — MIDODRINE HYDROCHLORIDE 2.5 MG/1
15 TABLET ORAL EVERY 8 HOURS
Refills: 0 | Status: DISCONTINUED | OUTPATIENT
Start: 2022-11-22 | End: 2022-12-06

## 2022-11-22 RX ADMIN — MIDODRINE HYDROCHLORIDE 10 MILLIGRAM(S): 2.5 TABLET ORAL at 05:49

## 2022-11-22 RX ADMIN — CHLORHEXIDINE GLUCONATE 15 MILLILITER(S): 213 SOLUTION TOPICAL at 05:49

## 2022-11-22 RX ADMIN — Medication 1 MILLIGRAM(S): at 11:41

## 2022-11-22 RX ADMIN — Medication 517.81 MILLIGRAM(S): at 15:03

## 2022-11-22 RX ADMIN — Medication 1 MILLIGRAM(S): at 15:02

## 2022-11-22 RX ADMIN — Medication 2 MILLIGRAM(S): at 09:39

## 2022-11-22 RX ADMIN — HEPARIN SODIUM 5000 UNIT(S): 5000 INJECTION INTRAVENOUS; SUBCUTANEOUS at 05:49

## 2022-11-22 RX ADMIN — Medication 1 MILLIGRAM(S): at 02:36

## 2022-11-22 RX ADMIN — ALBUTEROL 2 PUFF(S): 90 AEROSOL, METERED ORAL at 20:02

## 2022-11-22 RX ADMIN — Medication 5.35 MICROGRAM(S)/KG/MIN: at 23:02

## 2022-11-22 RX ADMIN — Medication 517.81 MILLIGRAM(S): at 21:10

## 2022-11-22 RX ADMIN — SENNA PLUS 2 TABLET(S): 8.6 TABLET ORAL at 21:10

## 2022-11-22 RX ADMIN — MIDODRINE HYDROCHLORIDE 15 MILLIGRAM(S): 2.5 TABLET ORAL at 21:11

## 2022-11-22 RX ADMIN — Medication 1 MILLIGRAM(S): at 21:11

## 2022-11-22 RX ADMIN — Medication 1 MILLIGRAM(S): at 17:03

## 2022-11-22 RX ADMIN — INSULIN GLARGINE 8 UNIT(S): 100 INJECTION, SOLUTION SUBCUTANEOUS at 07:40

## 2022-11-22 RX ADMIN — Medication 1 APPLICATION(S): at 15:11

## 2022-11-22 RX ADMIN — Medication 1 MILLIGRAM(S): at 05:49

## 2022-11-22 RX ADMIN — CHLORHEXIDINE GLUCONATE 1 APPLICATION(S): 213 SOLUTION TOPICAL at 11:36

## 2022-11-22 RX ADMIN — Medication 2: at 07:40

## 2022-11-22 RX ADMIN — MIDODRINE HYDROCHLORIDE 10 MILLIGRAM(S): 2.5 TABLET ORAL at 15:02

## 2022-11-22 RX ADMIN — Medication 517.81 MILLIGRAM(S): at 05:50

## 2022-11-22 RX ADMIN — HEPARIN SODIUM 5000 UNIT(S): 5000 INJECTION INTRAVENOUS; SUBCUTANEOUS at 17:03

## 2022-11-22 RX ADMIN — CHLORHEXIDINE GLUCONATE 15 MILLILITER(S): 213 SOLUTION TOPICAL at 17:03

## 2022-11-22 RX ADMIN — ALBUTEROL 2 PUFF(S): 90 AEROSOL, METERED ORAL at 07:41

## 2022-11-22 RX ADMIN — PANTOPRAZOLE SODIUM 40 MILLIGRAM(S): 20 TABLET, DELAYED RELEASE ORAL at 11:35

## 2022-11-22 RX ADMIN — Medication 5.35 MICROGRAM(S)/KG/MIN: at 07:33

## 2022-11-22 RX ADMIN — POLYETHYLENE GLYCOL 3350 17 GRAM(S): 17 POWDER, FOR SOLUTION ORAL at 17:03

## 2022-11-22 NOTE — PROGRESS NOTE ADULT - SUBJECTIVE AND OBJECTIVE BOX
BREA BECKHAM     [facility]    Allergies    codeine (Hives)    Intolerances        PAST MEDICAL & SURGICAL HISTORY:  Dementia of frontal lobe type      Aphasic stroke      Diabetes mellitus      Respiratory failure      Hypertension      GERD (gastroesophageal reflux disease)      Constipation      Respiratory failure      CVA (cerebral vascular accident)      HTN (hypertension)      DM (diabetes mellitus)      Advanced dementia      COVID-19 virus detected      Quadriplegia      Pneumonia      Type II diabetes mellitus      Hx of appendectomy      Gastrostomy in place      Tracheostomy in place      Tracheostomy tube present      Feeding by G-tube          FAMILY HISTORY:  No pertinent family history in first degree relatives        Home Medications:  Admelog 100 units/mL injectable solution: injectable every 6 hours SS (2022 05:08)  albuterol 90 mcg/inh inhalation aerosol with adapter: 2  inhaled every 6 hours (2022 04:24)  Bacid (LAC) oral tablet: 2 tab(s) by gastrostomy tube once a day (2022 04:24)  bacitracin 500 units/g topical ointment: Apply topically to affected area once a day to knees (2022 04:24)  chlorhexidine 0.12% mucous membrane liquid: 15 milliliter(s) mucous membrane 2 times a day (2022 04:24)  Dakins Full Strength 0.5% topical solution: Apply topically to affected area 2 times a day then apply santyl and calcium alginate (2022 04:24)  Eucerin topical cream: Apply topically to affected area once a day bilateral feet (2022 04:24)  ferrous sulfate 325 mg (65 mg elemental iron) oral tablet: 1 tab(s) by gastrostomy tube once a day (2022 04:24)  insulin glargine 100 units/mL subcutaneous solution: 8 unit(s) subcutaneous once a day (in the morning) (2022 04:24)  ipratropium-albuterol 0.5 mg-2.5 mg/3 mL inhalation solution: 3 milliliter(s) inhaled every 6 hours (2022 04:24)  LORazepam 1 mg oral tablet: 1 tab(s) by gastrostomy tube every 4 hours (2022 04:24)  midodrine 10 mg oral tablet: 1 tab(s) by gastrostomy tube every 8 hours (2022 04:24)  MiraLax oral powder for reconstitution: orally once a day (at bedtime) (2022 05:08)  mulivitamin: by gastrostomy tube once a day (2022 04:24)  nystatin 100,000 units/g topical powder: 1 application topically 3 times a day (2022 04:24)  omeprazole 40 mg oral delayed release capsule: 1 cap(s) by gastrostomy tube 2 times a day (2022 04:24)  polyethylene glycol 3350 oral powder for reconstitution: 17 gram(s) by gastrostomy tube every 12 hours (2022 04:24)  senna 8.6 mg oral tablet: 3 tab(s) by gastrostomy tube once a day (at bedtime) (2022 04:24)  simethicone 80 mg oral tablet, chewable: 1 tab(s) by gastrostomy tube every 6 hours (:24)  sucralfate 1 g/10 mL oral suspension: 10 milliliter(s) g-tube 4 times a day (before meals and at bedtime) (2022 04:24)  Tylenol 325 mg oral tablet: 2 tab(s) orally every 6 hours, As Needed prior to dressing change (2022 04:24)      MEDICATIONS  (STANDING):  albuterol    90 MICROgram(s) HFA Inhaler 2 Puff(s) Inhalation two times a day  chlorhexidine 0.12% Liquid 15 milliLiter(s) Oral Mucosa every 12 hours  chlorhexidine 2% Cloths 1 Application(s) Topical daily  dextrose 5%. 1000 milliLiter(s) (50 mL/Hr) IV Continuous <Continuous>  dextrose 5%. 1000 milliLiter(s) (100 mL/Hr) IV Continuous <Continuous>  dextrose 50% Injectable 25 Gram(s) IV Push once  dextrose 50% Injectable 12.5 Gram(s) IV Push once  dextrose 50% Injectable 25 Gram(s) IV Push once  glucagon  Injectable 1 milliGRAM(s) IntraMuscular once  heparin   Injectable 5000 Unit(s) SubCutaneous every 12 hours  insulin glargine Injectable (LANTUS) 8 Unit(s) SubCutaneous every morning  insulin lispro (ADMELOG) corrective regimen sliding scale   SubCutaneous three times a day before meals  LORazepam     Tablet 1 milliGRAM(s) Oral every 4 hours  midodrine 10 milliGRAM(s) Oral every 8 hours  norepinephrine Infusion 0.05 MICROgram(s)/kG/Min (5.35 mL/Hr) IV Continuous <Continuous>  pantoprazole  Injectable 40 milliGRAM(s) IV Push daily  polyethylene glycol 3350 17 Gram(s) Oral every 12 hours  senna 2 Tablet(s) Oral at bedtime  trimethoprim / sulfamethoxazole IVPB 285 milliGRAM(s) IV Intermittent every 8 hours    MEDICATIONS  (PRN):  dextrose Oral Gel 15 Gram(s) Oral once PRN Blood Glucose LESS THAN 70 milliGRAM(s)/deciliter  LORazepam   Injectable 2 milliGRAM(s) IV Push every 6 hours PRN Agitation  sodium chloride 0.9% lock flush 10 milliLiter(s) IV Push every 1 hour PRN Pre/post blood products, medications, blood draw, and to maintain line patency      Diet, NPO with Tube Feed:   Tube Feeding Modality: Gastrostomy  Glucerna 1.5 Horacio  Total Volume for 24 Hours (mL): 1000  Continuous  Starting Tube Feed Rate mL per Hour: 10  Increase Tube Feed Rate by (mL): 10     Every 10 hours  Until Goal Tube Feed Rate (mL per Hour): 50  Tube Feed Duration (in Hours): 20  Tube Feed Start Time: 17:00  Free Water Flush  Pump   Rate (mL per Hour): 25   Frequency: Every Hour    Duration (Hours): 24 (22 @ 05:10) [Active]          Vital Signs Last 24 Hrs  T(C): 36.7 (2022 21:19), Max: 37.2 (2022 12:30)  T(F): 98 (2022 21:19), Max: 98.9 (2022 12:30)  HR: 91 (2022 09:00) (61 - 91)  BP: 157/93 (2022 09:00) (92/52 - 157/93)  BP(mean): 111 (2022 09:00) (65 - 111)  RR: 24 (2022 09:00) (13 - 31)  SpO2: 78% (2022 09:00) (60% - 100%)    Parameters below as of 2022 09:00  Patient On (Oxygen Delivery Method): ventilator    O2 Concentration (%): 50      22 @ 07:01  -  22 @ 07:00  --------------------------------------------------------  IN: 1074 mL / OUT: 475 mL / NET: 599 mL        Mode: AC/ CMV (Assist Control/ Continuous Mandatory Ventilation), RR (machine): 18, TV (machine): 350, FiO2: 50, PEEP: 5, ITime: 1, MAP: 20, PIP: 40      LABS:                        7.8    11.71 )-----------( 191      ( 2022 06:00 )             25.9         137  |  102  |  25<H>  ----------------------------<  173<H>  3.7   |  30  |  0.99    Ca    7.7<L>      2022 06:00  Phos  2.9         TPro  6.4  /  Alb  1.8<L>  /  TBili  0.4  /  DBili  x   /  AST  25  /  ALT  12  /  AlkPhos  107      PT/INR - ( 2022 06:00 )   PT: 16.0 sec;   INR: 1.35 ratio         PTT - ( 2022 03:56 )  PTT:40.0 sec  Urinalysis Basic - ( 2022 04:25 )    Color: Yellow / Appearance: Clear / S.015 / pH: x  Gluc: x / Ketone: Trace  / Bili: Negative / Urobili: Negative mg/dL   Blood: x / Protein: 500 mg/dL / Nitrite: Negative   Leuk Esterase: Trace / RBC: 6-10 /HPF / WBC 3-5   Sq Epi: x / Non Sq Epi: Many / Bacteria: Few        ABG - ( 2022 04:09 )  pH, Arterial: 7.59  pH, Blood: x     /  pCO2: 36    /  pO2: 273   / HCO3: 34    / Base Excess: 12.8  /  SaO2: 99.8                WBC:  WBC Count: 11.71 K/uL ( @ 06:00)  WBC Count: 16.01 K/uL ( @ 03:56)      MICROBIOLOGY:  RECENT CULTURES:   Trach Asp Tracheal Aspirate XXXX   Moderate polymorphonuclear leukocytes per low power field  No Squamous epithelial cells per low power field  No organisms seen XXXX     Clean Catch Clean Catch (Midstream) XXXX XXXX   <10,000 CFU/mL Normal Urogenital Elvira                PT/INR - ( 2022 06:00 )   PT: 16.0 sec;   INR: 1.35 ratio         PTT - ( 2022 03:56 )  PTT:40.0 sec    Sodium:  Sodium, Serum: 137 mmol/L ( @ 06:00)  Sodium, Serum: 143 mmol/L ( @ 03:56)      0.99 mg/dL  @ 06:00  1.25 mg/dL  @ 03:56      Hemoglobin:  Hemoglobin: 7.8 g/dL ( @ 06:00)  Hemoglobin: 9.6 g/dL ( @ 03:56)      Platelets: Platelet Count - Automated: 191 K/uL ( @ 06:00)  Platelet Count - Automated: 290 K/uL ( @ 03:56)      LIVER FUNCTIONS - ( 2022 06:00 )  Alb: 1.8 g/dL / Pro: 6.4 g/dL / ALK PHOS: 107 U/L / ALT: 12 U/L DA / AST: 25 U/L / GGT: x             Urinalysis Basic - ( 2022 04:25 )    Color: Yellow / Appearance: Clear / S.015 / pH: x  Gluc: x / Ketone: Trace  / Bili: Negative / Urobili: Negative mg/dL   Blood: x / Protein: 500 mg/dL / Nitrite: Negative   Leuk Esterase: Trace / RBC: 6-10 /HPF / WBC 3-5   Sq Epi: x / Non Sq Epi: Many / Bacteria: Few        RADIOLOGY & ADDITIONAL STUDIES:      MICROBIOLOGY:  RECENT CULTURES:   Trach Asp Tracheal Aspirate XXXX   Moderate polymorphonuclear leukocytes per low power field  No Squamous epithelial cells per low power field  No organisms seen XXXX     Clean Catch Clean Catch (Midstream) XXXX XXXX   <10,000 CFU/mL Normal Urogenital Elvira

## 2022-11-22 NOTE — CONSULT NOTE ADULT - NSCONSULTADDITIONALINFOA_GEN_ALL_CORE
Diet, NPO with Tube Feed:   Tube Feeding Modality: Gastrostomy  Glucerna 1.5 Horacio  Total Volume for 24 Hours (mL): 1000  Continuous  Starting Tube Feed Rate {mL per Hour}: 10  Increase Tube Feed Rate by (mL): 10     Every 10 hours  Until Goal Tube Feed Rate (mL per Hour): 50  Tube Feed Duration (in Hours): 20  Tube Feed Start Time: 17:00  Free Water Flush  Pump   Rate (mL per Hour): 25   Frequency: Every Hour    Duration (Hours): 24 (11-21-22 @ 05:10) [Active]

## 2022-11-22 NOTE — PROGRESS NOTE ADULT - ASSESSMENT
77F with dementia, COPD with chronic respiratory failure s/p trach, cardiac arrest, CHH, quadriplegia, CVA, CKD, anemia, PEG tube, and multiple hospital admissions for respiratory distress presents to the ED BIBEMS from [facility] for diaphoresis and fever.       on bactrim  TLC inserted  vs noted  labs reviewed    GOC   pt is full code   is full code  assist with needs -   oral hygiene  skin care  recurrent admissions  long term resident of SNF  vent dep  anoxic brain injury - dementia - vegetative state

## 2022-11-22 NOTE — PROGRESS NOTE ADULT - SUBJECTIVE AND OBJECTIVE BOX
[facility], Division of Infectious Diseases  MOIZ Lora Y. Patel, S. Shah, G. [facility]  [phone]    Name: BREA BECKHAM  Age: 77y  Gender: Female  MRN: [id number]    Interval History:  Patient seen and examined at bedside   No acute overnight events. Afebrile  Notes reviewed    Antibiotics:  trimethoprim / sulfamethoxazole IVPB 285 milliGRAM(s) IV Intermittent every 8 hours      Medications:  albuterol    90 MICROgram(s) HFA Inhaler 2 Puff(s) Inhalation two times a day  chlorhexidine 0.12% Liquid 15 milliLiter(s) Oral Mucosa every 12 hours  chlorhexidine 2% Cloths 1 Application(s) Topical daily  dextrose 5%. 1000 milliLiter(s) IV Continuous <Continuous>  dextrose 5%. 1000 milliLiter(s) IV Continuous <Continuous>  dextrose 50% Injectable 25 Gram(s) IV Push once  dextrose 50% Injectable 12.5 Gram(s) IV Push once  dextrose 50% Injectable 25 Gram(s) IV Push once  dextrose Oral Gel 15 Gram(s) Oral once PRN  glucagon  Injectable 1 milliGRAM(s) IntraMuscular once  heparin   Injectable 5000 Unit(s) SubCutaneous every 12 hours  insulin glargine Injectable (LANTUS) 8 Unit(s) SubCutaneous every morning  insulin lispro (ADMELOG) corrective regimen sliding scale   SubCutaneous three times a day before meals  LORazepam     Tablet 1 milliGRAM(s) Oral every 4 hours  LORazepam   Injectable 2 milliGRAM(s) IV Push every 6 hours PRN  midodrine 10 milliGRAM(s) Oral every 8 hours  norepinephrine Infusion 0.05 MICROgram(s)/kG/Min IV Continuous <Continuous>  pantoprazole  Injectable 40 milliGRAM(s) IV Push daily  polyethylene glycol 3350 17 Gram(s) Oral every 12 hours  senna 2 Tablet(s) Oral at bedtime  sodium chloride 0.9% lock flush 10 milliLiter(s) IV Push every 1 hour PRN  trimethoprim / sulfamethoxazole IVPB 285 milliGRAM(s) IV Intermittent every 8 hours      Review of Systems:  unable to obtain    Allergies: codeine (Hives)    For details regarding the patient's past medical history, social history, family history, and other miscellaneous elements, please refer the initial infectious diseases consultation and/or the admitting history and physical examination for this admission.    Objective:  Vitals:   T(C): 36.7 (22 @ 21:19), Max: 37.2 (22 @ 12:30)  HR: 63 (22 @ 11:30) (61 - 91)  BP: 95/48 (22 @ 11:30) (86/48 - 157/93)  RR: 18 (22 @ 11:30) (13 - 34)  SpO2: 100% (22 @ 11:30) (60% - 100%)    Physical Examination:  General: vented pt, nonverbal, vegetative state  HEENT: NC/AT  Neck: trach to vent  Lungs: MV breath sounds  Heart: Regular rate and rhythm.  Abdomen: Soft, distended, PGT in place.  Extremities: b/l 1st toes w/ appearance of dry gangrene  Skin: Warm.       Laboratory Studies:  CBC:                       7.8    11.71 )-----------( 191      ( 2022 06:00 )             25.9     CMP:     137  |  102  |  25<H>  ----------------------------<  173<H>  3.7   |  30  |  0.99    Ca    7.7<L>      2022 06:00  Phos  2.9         TPro  6.4  /  Alb  1.8<L>  /  TBili  0.4  /  DBili  x   /  AST  25  /  ALT  12  /  AlkPhos  107      LIVER FUNCTIONS - ( 2022 06:00 )  Alb: 1.8 g/dL / Pro: 6.4 g/dL / ALK PHOS: 107 U/L / ALT: 12 U/L DA / AST: 25 U/L / GGT: x           Urinalysis Basic - ( 2022 04:25 )    Color: Yellow / Appearance: Clear / S.015 / pH: x  Gluc: x / Ketone: Trace  / Bili: Negative / Urobili: Negative mg/dL   Blood: x / Protein: 500 mg/dL / Nitrite: Negative   Leuk Esterase: Trace / RBC: 6-10 /HPF / WBC 3-5   Sq Epi: x / Non Sq Epi: Many / Bacteria: Few        Microbiology: reviewed    Culture - Sputum (collected 22 @ 20:14)  Source: Trach Asp Tracheal Aspirate  Gram Stain (22 @ 08:43):    Moderate polymorphonuclear leukocytes per low power field    No Squamous epithelial cells per low power field    No organisms seen    Culture - Urine (collected 22 @ 04:25)  Source: Clean Catch Clean Catch (Midstream)  Final Report (22 @ 09:39):    <10,000 CFU/mL Normal Urogenital Elvira    Culture - Sputum (collected 22 @ 01:44)  Source: Trach Asp Tracheal Aspirate  Gram Stain (22 @ 21:20):    No polymorphonuclear leukocytes per low power field    No Squamous epithelial cells per low power field    No organisms seen per oil power field  Final Report (22 @ 12:08):    Rare Stenotrophomonas maltophilia    Normal Respiratory Elvira present  Organism: Stenotrophomonas maltophilia (22 @ 12:08)  Organism: Stenotrophomonas maltophilia (22 @ 12:08)      -  Minocycline: S      Method Type: KB  Organism: Stenotrophomonas maltophilia (22 @ 12:08)      -  Levofloxacin: I 4      -  Trimethoprim/Sulfamethoxazole: S 2      Method Type: PRATIK    Culture - Urine (collected 22 @ 06:53)  Source: Clean Catch Clean Catch (Midstream)  Final Report (22 @ 00:07):    <10,000 CFU/mL Normal Urogenital Elvira    Culture - Blood (collected 22 @ 06:53)  Source: .Blood Blood-Peripheral  Final Report (22 @ 13:00):    No Growth Final    Culture - Blood (collected 22 @ 06:53)  Source: .Blood Blood-Peripheral  Final Report (22 @ 13:00):    No Growth Final          Radiology: reviewed

## 2022-11-22 NOTE — PROGRESS NOTE ADULT - SUBJECTIVE AND OBJECTIVE BOX
Date/Time Patient Seen:  		  Referring MD:   Data Reviewed	       Patient is a 77y old  Female who presents with a chief complaint of diaphoresis and fever (22 Nov 2022 00:25)      Subjective/HPI     PAST MEDICAL & SURGICAL HISTORY:  Dementia of frontal lobe type    Aphasic stroke    Diabetes mellitus    Respiratory failure    Hypertension    GERD (gastroesophageal reflux disease)    Constipation    Respiratory failure    CVA (cerebral vascular accident)    HTN (hypertension)    DM (diabetes mellitus)    Advanced dementia    COVID-19 virus detected    Quadriplegia    Pneumonia    Type II diabetes mellitus    Hx of appendectomy    Gastrostomy in place    Tracheostomy in place    Tracheostomy tube present    Feeding by G-tube          Medication list         MEDICATIONS  (STANDING):  albuterol    90 MICROgram(s) HFA Inhaler 2 Puff(s) Inhalation two times a day  chlorhexidine 0.12% Liquid 15 milliLiter(s) Oral Mucosa every 12 hours  chlorhexidine 2% Cloths 1 Application(s) Topical daily  dextrose 5%. 1000 milliLiter(s) (50 mL/Hr) IV Continuous <Continuous>  dextrose 5%. 1000 milliLiter(s) (100 mL/Hr) IV Continuous <Continuous>  dextrose 50% Injectable 25 Gram(s) IV Push once  dextrose 50% Injectable 12.5 Gram(s) IV Push once  dextrose 50% Injectable 25 Gram(s) IV Push once  glucagon  Injectable 1 milliGRAM(s) IntraMuscular once  heparin   Injectable 5000 Unit(s) SubCutaneous every 12 hours  insulin glargine Injectable (LANTUS) 8 Unit(s) SubCutaneous every morning  insulin lispro (ADMELOG) corrective regimen sliding scale   SubCutaneous three times a day before meals  LORazepam     Tablet 1 milliGRAM(s) Oral every 4 hours  midodrine 10 milliGRAM(s) Oral every 8 hours  norepinephrine Infusion 0.05 MICROgram(s)/kG/Min (5.35 mL/Hr) IV Continuous <Continuous>  pantoprazole  Injectable 40 milliGRAM(s) IV Push daily  polyethylene glycol 3350 17 Gram(s) Oral every 12 hours  senna 2 Tablet(s) Oral at bedtime  trimethoprim / sulfamethoxazole IVPB 285 milliGRAM(s) IV Intermittent every 8 hours    MEDICATIONS  (PRN):  dextrose Oral Gel 15 Gram(s) Oral once PRN Blood Glucose LESS THAN 70 milliGRAM(s)/deciliter  LORazepam   Injectable 2 milliGRAM(s) IV Push every 6 hours PRN Agitation  sodium chloride 0.9% lock flush 10 milliLiter(s) IV Push every 1 hour PRN Pre/post blood products, medications, blood draw, and to maintain line patency         Vitals log        ICU Vital Signs Last 24 Hrs  T(C): 36.7 (21 Nov 2022 21:19), Max: 37.2 (21 Nov 2022 12:30)  T(F): 98 (21 Nov 2022 21:19), Max: 98.9 (21 Nov 2022 12:30)  HR: 64 (22 Nov 2022 06:00) (61 - 84)  BP: 110/61 (22 Nov 2022 06:00) (92/52 - 127/59)  BP(mean): 77 (22 Nov 2022 06:00) (63 - 89)  ABP: --  ABP(mean): --  RR: 30 (22 Nov 2022 06:00) (13 - 31)  SpO2: 100% (22 Nov 2022 06:00) (60% - 100%)    O2 Parameters below as of 21 Nov 2022 20:00  Patient On (Oxygen Delivery Method): ventilator             Mode: AC/ CMV (Assist Control/ Continuous Mandatory Ventilation)  RR (machine): 18  TV (machine): 350  FiO2: 50  PEEP: 5  ITime: 1  MAP: 19  PIP: 34      Input and Output:  I&O's Detail    20 Nov 2022 07:01  -  21 Nov 2022 07:00  --------------------------------------------------------  IN:    Norepinephrine: 5.3 mL  Total IN: 5.3 mL    OUT:  Total OUT: 0 mL    Total NET: 5.3 mL      21 Nov 2022 07:01  -  22 Nov 2022 06:23  --------------------------------------------------------  IN:    IV PiggyBack: 1000 mL    Norepinephrine: 69.8 mL  Total IN: 1069.8 mL    OUT:  Total OUT: 0 mL    Total NET: 1069.8 mL          Lab Data                        9.6    16.01 )-----------( 290      ( 21 Nov 2022 03:56 )             31.7     11-21    143  |  101  |  35<H>  ----------------------------<  124<H>  5.4<H>   |  31  |  1.25    Ca    9.4      21 Nov 2022 03:56    TPro  8.6<H>  /  Alb  2.6<L>  /  TBili  0.6  /  DBili  x   /  AST  41<H>  /  ALT  20  /  AlkPhos  128<H>  11-21    ABG - ( 21 Nov 2022 04:09 )  pH, Arterial: 7.59  pH, Blood: x     /  pCO2: 36    /  pO2: 273   / HCO3: 34    / Base Excess: 12.8  /  SaO2: 99.8                    Review of Systems	      Objective     Physical Examination  heart s1s2  lung dec BS  head nc        Pertinent Lab findings & Imaging      Annalise:  NO   Adequate UO     I&O's Detail    20 Nov 2022 07:01  -  21 Nov 2022 07:00  --------------------------------------------------------  IN:    Norepinephrine: 5.3 mL  Total IN: 5.3 mL    OUT:  Total OUT: 0 mL    Total NET: 5.3 mL      21 Nov 2022 07:01  -  22 Nov 2022 06:23  --------------------------------------------------------  IN:    IV PiggyBack: 1000 mL    Norepinephrine: 69.8 mL  Total IN: 1069.8 mL    OUT:  Total OUT: 0 mL    Total NET: 1069.8 mL               Discussed with:     Cultures:	        Radiology

## 2022-11-22 NOTE — CONSULT NOTE ADULT - CONSULT REASON
Wound Consult
Sepsis 2/2 VAP
Acute on chronic respiratory failure, septic shock
Severe sepsis/ VAP
anemia
pneu
chr resp failure  GOC

## 2022-11-22 NOTE — CONSULT NOTE ADULT - SUBJECTIVE AND OBJECTIVE BOX
INTERVAL HPI/OVERNIGHT EVENTS:    MEDICATIONS  (STANDING):  albuterol    90 MICROgram(s) HFA Inhaler 2 Puff(s) Inhalation two times a day  chlorhexidine 0.12% Liquid 15 milliLiter(s) Oral Mucosa every 12 hours  chlorhexidine 2% Cloths 1 Application(s) Topical daily  dextrose 5%. 1000 milliLiter(s) (50 mL/Hr) IV Continuous <Continuous>  dextrose 5%. 1000 milliLiter(s) (100 mL/Hr) IV Continuous <Continuous>  dextrose 50% Injectable 25 Gram(s) IV Push once  dextrose 50% Injectable 12.5 Gram(s) IV Push once  dextrose 50% Injectable 25 Gram(s) IV Push once  glucagon  Injectable 1 milliGRAM(s) IntraMuscular once  heparin   Injectable 5000 Unit(s) SubCutaneous every 12 hours  insulin glargine Injectable (LANTUS) 8 Unit(s) SubCutaneous every morning  insulin lispro (ADMELOG) corrective regimen sliding scale   SubCutaneous three times a day before meals  LORazepam     Tablet 1 milliGRAM(s) Oral every 4 hours  midodrine 10 milliGRAM(s) Oral every 8 hours  norepinephrine Infusion 0.05 MICROgram(s)/kG/Min (5.35 mL/Hr) IV Continuous <Continuous>  pantoprazole  Injectable 40 milliGRAM(s) IV Push daily  polyethylene glycol 3350 17 Gram(s) Oral every 12 hours  senna 2 Tablet(s) Oral at bedtime  trimethoprim / sulfamethoxazole IVPB 285 milliGRAM(s) IV Intermittent every 8 hours    MEDICATIONS  (PRN):  dextrose Oral Gel 15 Gram(s) Oral once PRN Blood Glucose LESS THAN 70 milliGRAM(s)/deciliter  LORazepam   Injectable 2 milliGRAM(s) IV Push every 6 hours PRN Agitation  sodium chloride 0.9% lock flush 10 milliLiter(s) IV Push every 1 hour PRN Pre/post blood products, medications, blood draw, and to maintain line patency      Allergies    codeine (Hives)    Intolerances        Review of Systems:    General:  No wt loss, fevers, chills, night sweats,fatigue,   Eyes:  Good vision, no reported pain  ENT:  No sore throat, pain, runny nose, dysphagia  CV:  No pain, palpitatioins, hypo/hypertension  Resp:  No dyspnea, cough, tachypnea, wheezing  GI:  No pain, No nausea, No vomiting, No diarrhea, No constipatiion, No weight loss, No fever, No pruritis, No rectal bleeding, No tarry stools, No dysphagia,  :  No pain, bleeding, incontinence, nocturia  Muscle:  No pain, weakness  Neuro:  No weakness, tingling, memory problems  Psych:  No fatigue, insomnia, mood problems, depression  Endocrine:  No polyuria, polydypsia, cold/heat intolerance  Heme:  No petechiae, ecchymosis, easy bruisability  Skin:  No rash, tattoos, scars, edema      Vital Signs Last 24 Hrs  T(C): 36.7 (2022 21:19), Max: 37.2 (2022 12:30)  T(F): 98 (2022 21:19), Max: 98.9 (2022 12:30)  HR: 91 (2022 09:00) (61 - 91)  BP: 157/93 (2022 09:00) (92/52 - 157/93)  BP(mean): 111 (2022 09:00) (65 - 111)  RR: 24 (2022 09:00) (13 - 31)  SpO2: 78% (2022 09:00) (60% - 100%)    Parameters below as of 2022 09:00  Patient On (Oxygen Delivery Method): ventilator    O2 Concentration (%): 50    PHYSICAL EXAM:    Constitutional: NAD, well-developed  HEENT: EOMI, throat clear  Neck: No LAD, supple  Respiratory: CTA and P  Cardiovascular: S1 and S2, RRR, no M  Gastrointestinal: BS+, soft, NT/ND, neg HSM,  Extremities: No peripheral edema, neg clubing, cyanosis  Vascular: 2+ peripheral pulses  Neurological: A/O x 3, no focal deficits  Psychiatric: Normal mood, normal affect  Skin: No rashes      LABS:                        7.8    11.71 )-----------( 191      ( 2022 06:00 )             25.9     11-    137  |  102  |  25<H>  ----------------------------<  173<H>  3.7   |  30  |  0.99    Ca    7.7<L>      2022 06:00  Phos  2.9     11-22    TPro  6.4  /  Alb  1.8<L>  /  TBili  0.4  /  DBili  x   /  AST  25  /  ALT  12  /  AlkPhos  107      PT/INR - ( 2022 06:00 )   PT: 16.0 sec;   INR: 1.35 ratio         PTT - ( 2022 03:56 )  PTT:40.0 sec  Urinalysis Basic - ( 2022 04:25 )    Color: Yellow / Appearance: Clear / S.015 / pH: x  Gluc: x / Ketone: Trace  / Bili: Negative / Urobili: Negative mg/dL   Blood: x / Protein: 500 mg/dL / Nitrite: Negative   Leuk Esterase: Trace / RBC: 6-10 /HPF / WBC 3-5   Sq Epi: x / Non Sq Epi: Many / Bacteria: Few        RADIOLOGY & ADDITIONAL TESTS:

## 2022-11-22 NOTE — PROGRESS NOTE ADULT - SUBJECTIVE AND OBJECTIVE BOX
Chief Complaint: Respiratory distress    Interval Events: No events overnight.    Review of Systems:  General: No fevers, chills, weight gain  Skin: No rashes, color changes  Cardiovascular: No chest pain, orthopnea  Respiratory: No shortness of breath, cough  Gastrointestinal: No nausea, abdominal pain  Genitourinary: No incontinence, pain with urination  Musculoskeletal: No pain, swelling, decreased range of motion  Neurological: No headache, weakness  Psychiatric: No depression, anxiety  Endocrine: No weight gain, increased thirst  All other systems are comprehensively negative.    Physical Exam:  Vitals:        Vital Signs Last 24 Hrs  T(C): 36.7 (21 Nov 2022 21:19), Max: 37.2 (21 Nov 2022 12:30)  T(F): 98 (21 Nov 2022 21:19), Max: 98.9 (21 Nov 2022 12:30)  HR: 69 (22 Nov 2022 10:30) (61 - 91)  BP: 87/46 (22 Nov 2022 10:30) (86/48 - 157/93)  BP(mean): 59 (22 Nov 2022 10:30) (59 - 111)  RR: 21 (22 Nov 2022 10:30) (13 - 34)  SpO2: 99% (22 Nov 2022 10:30) (60% - 100%)  Parameters below as of 22 Nov 2022 10:00  Patient On (Oxygen Delivery Method): ventilator  O2 Concentration (%): 50  General: NAD  HEENT: MMM  Neck: No JVD, no carotid bruit  Lungs: CTAB  CV: RRR, nl S1/S2, no M/R/G  Abdomen: S/NT/ND, +BS  Extremities: No LE edema, no cyanosis  Neuro: AAOx3, non-focal  Skin: No rash    Labs:                        7.8    11.71 )-----------( 191      ( 22 Nov 2022 06:00 )             25.9     11-22    137  |  102  |  25<H>  ----------------------------<  173<H>  3.7   |  30  |  0.99    Ca    7.7<L>      22 Nov 2022 06:00  Phos  2.9     11-22    TPro  6.4  /  Alb  1.8<L>  /  TBili  0.4  /  DBili  x   /  AST  25  /  ALT  12  /  AlkPhos  107  11-22        PT/INR - ( 22 Nov 2022 06:00 )   PT: 16.0 sec;   INR: 1.35 ratio         PTT - ( 21 Nov 2022 03:56 )  PTT:40.0 sec    ECG/Telemetry: Sinus rhythm

## 2022-11-22 NOTE — CONSULT NOTE ADULT - CONSULT REQUESTED DATE/TIME
21-Nov-2022
21-Nov-2022 04:50
21-Nov-2022 09:02
21-Nov-2022 06:37
22-Nov-2022 12:11
21-Nov-2022 10:20
22-Nov-2022 09:59

## 2022-11-22 NOTE — PROGRESS NOTE ADULT - ASSESSMENT
Assessment   6 y/o Full code F with pmhx of dementia, COPD with chronic respiratory failure, S/p Trach and peg ( Vent Dependent), Cardiac arrest, quadriplegia, CVA, CKD, Anemia, and multiple admissions to hospital ( twice in past 6 weeks) including being discharged on 11/16 with VAP presents to ED from [facility] with fevers and abnormal labs. Elevated WBC with left shift, febrile, CXR shows worsening R lung infiltrates, appears to have septic shock and worsening VAP. Hx of MDRO with resistance to zosyn and susceptibility to bactrim.  Vented on 18/400/50%/5. S/p 30 cc/kg NS bolus with refractory hypotension reuuring levophed to maintain map > 65. Today titrated off levo.   Pt is critically ill with high risk for acute deterioration and will be accepted to SPCU for active treatment of   1. Septic Shock   2. VAP  3. Acute on chronic hypoxic RF  4. Hyperkalemia  5. Transaminitis    Plan  Neuro: At baseline mental status. Ativan standing for agitation ordered. Added PRN IV ativan.   Cardio: S/p 30 cc/kg of NS for sepsis. Titrated off pressors. Midodrine 10 mg TID. Cardiology consult appreciated.   Pulm: Vented. 18/350/60%/5. Titrating Fio2 to maintain Spo2 > 92 %. Lung protective TV 6-8 cc/kg/IBW. Keep Plateau pressure < 30. Bronchodilator.   GI: NPO with tube feeds restarted as tolerated.  IV PPI. Bowel regimen. Transaminitis likely 2/2 to severe sepsis, will trend LFT, avoid hepatoxic agents.   Renal: Scr 1.25, baseline around 1.15. Trend Scr and lytes and replete as needed.  I/O's. Avoid nephrotoxic agents  ID: S/p Vanco Zosyn and Meropenum Switched to IV bactrim, will continue.  Elevated WBC with left shift, febrile, will trend. Strep pneumo and legionella urine negative. MRSA swab negative. UA weakly positive. Pro call 1.88 favors bacterial infection, will trend with abx use.   Bcx, Ucx f/u.  ID consult apprecaited  Heme; Heparin for DVT prophylaxis   Endo: lantus 8 units in AM. ISS. Goal -180  Dispo: Improving septic shock, still high risk for acute deterioration .     GOC discussion: Had lengthy discussion with  about goals of care given patients poor prognosis. He stated "medical providers" have been telling him to make his wife DNR/DNI for up to 11 years now and it is something he does not agree with as he views this as giving up or "killing" her. States he feels harassed at times by people approaching him with goals of care. Would recommend avoiding bringing up patients code status, she is " full code and will remain that way" .

## 2022-11-22 NOTE — PROGRESS NOTE ADULT - ASSESSMENT
REVIEW OF SYMPTOMS      Able to give (reliable) ROS  NO     PHYSICAL EXAM    HEENT Unremarkable  atraumatic   RESP Fair air entry EXP prolonged    Harsh breath sound Resp distres mild   CARDIAC S1 S2 No S3     NO JVD    ABDOMEN SOFT BS PRESENT NOT DISTENDED No hepatosplenomegaly   PEDAL EDEMA present No calf tenderness  NO rash       GENERAL DATA .   GOC.  full code      ALLGY.     codeine                        WT. 11/21/2022 57  BMI.    11/21/2022 21                          ICU STAY.   admitted ICU 11/21/2022  COVID.  Scv2 11/21/2022 scv2 (-)    PROCEDURE.  11/21/2022 rij central line    BEST PRACTICE ISSUES.    HOB ELEVATN. Yes  DVT PPLX.  11/21/2022 hpsc    SQUIRES PPLX.    11/21/2022 protonix 40   INFN PPLX.    11/21/2022 chlorhexidine .12% bid (mrsa)   11/21/2022 chlorhexidine 2% (c li)   SP SW EM.         DIET.  11/21/2022 glucerna 1.5 1000 gt               VS/ PO/IO/ VENT/ DRIPS.   11/22/2022 58 100/40   11/22/2022 ac 18/350/5/.5   11/22/2022 8p nore .06 m/k/m       PATIENT PRESENTATION .  76 f PMH trach peg cva cac anoxic encephalo PH 8/19/2022 pasp 58 bl pl effs  r thora 6/8/2022 and l thora 5/25/2022 were lp exudates  recurrent hospitalizations HO CR psuedomonas 5/2/2022 zosyn 8/19/2022  recent admission 10/18-11/2/2022 uc 10/18 100K E coli  sp 10/19 Stenotrophomonas  10/19-10/26/2022 levaquin 750  10/21/2022 rocephin x 7d Dr BRITTANY Brantley readmitted recently  11/4-11/10/2022 with fever trach 11/5 stenotrophomonas  uc 11/4 vr enterococc 50-99 candida   11/4/2022 levaquin 750 dced 11/7 doxy  11/8 bactrim 250.3  RECENT ADMISSIONS.  11/12-11/16/2022 11/4-11/10/2022  10/18-11/2/2022 8/18-8/26/2022   CURRENT ADMISSION  Pt sent back from [facility] 11/21/2022 with fever abn labs Found yto be in shock Norepi started 11/21/2022   Pulm crit care consulted    PROBLEMS.  Vent dependent   .. Trach poa 11/21/2022  RIJ 11/21/2022   INFECTN   .. VAP 11/21/2022  .. Decub ulcers   ...... Bactrim tid  11/21/2022  COPD  Shock   .. Norepi 11/21/2022  .. midodrine 11/21/2022   peg poa 11/21/2022  ANEMIA       ASSESSMENT/RECOMMENDATIONS .   RESP.  .. Gas exchange.  11/21/2022 4a On 100% vent 759/36/273   Monitor & target po 90-95%  .. VENT MANAGEMENT.   HOB elevation  Target Pplat 30 (-)  Target PO 90-95%  Target pH 730 (+)  Daily spontaneous breathing trials   Daily sedation vacation   .. Pleural effsn .   CXR 11/21/2022 r gr than l effsn  .. Bronchospasm.  11/21/2022 albut hfa 2p bid   INFECTION.  .. SCV2 status.  Scv2 11/21/2022 scv2 (-)  .. PAST MICROBIO/ABIO.  sp 10/19 Stenotrophomonas    10/19-10/26/2022 levaquin   trach 11/5 stenotrophomonas   uc 11/4 vr enterococc 50-99 candida   11/4/2022 levaquin 750 dced   11/7 doxy    11/8 bactrim 250.3  .. VAP   w 11/21/2022 w 16   Pr 11/21-11/22/2022 pr 1.8 - 1.1  ua 11/21/2022 w 3-5   rvp 11/21/2022 (-)   rsv 11/21/2022 (-)   uc 11/21 (-)   legn 11/21 (-)   mrsa 11/21 (-)   bc 11/21 (-)   11/21/2022 bactrim 285 mg trimeth q 8 h Dr BRITTANY Brantley   CARDIAC.  .. Shock.    11/21/2022 midodrine 10.3 -> 11/22 midodr 15.3   11/21/2022 5a norepi 8 in 250   target map 65 (+)   .. ekg changes.  ekg 11/21/2022 s tach shoirt pr qtc 470 possible rvh   tr 11/22/2022 tr 28   GI.  .. Nutrition.   11/21/2022 glucerna 1.5 1000 gt    .. elevated lfts   lfts 11/21/2022  ap 128  ast 41  alt 20   .. constipatn.  11/21/2022 miralax  11/21/2022 senna   HEMAT.  .. DVT pplx.   11/21/2022 hpsc    .. anemia.  Hb 11/21-11/22/2022 Hb 9.6- 7.8    mcv 11/21/2022 mcv 90   inr 11/21/2022 inr 122   monitor target Hb 7 (+)   RENAL.  .. renal parameters.  Na 11/21/2022 Na 143   K 11/21-11/22/2022 K 5.4 - 3.7  CO2 11/21/2022 co2 31   Cr 11/21/2022 Cr 1.2   ag 11/21/2022 ag 11  alb 11/21/2022 alb 2.8   11/21/2022 lokelma 10 givn   IV fl.   ENDOCRINE.   .. DM   11/21/2022 glarg 8 q am   11/21/2022 riss   NEURO.  .. seizures.  11/21/2022 lorazepam 1.6     TIME SPENT   Over 39 minutes aggregate critical care time spent on encounter; activities included   direct patient care, counseling and/or coordinating care reviewing notes, lab data/ imaging , discussion with multidisciplinary team/ patient  /family and explaining in detail risks, benefits, alternatives  of the recommendations     CHAPINCITO GUIDRY 76 f [facility] S 11/21/2022   DR JOSEPH DU

## 2022-11-22 NOTE — PROGRESS NOTE ADULT - ASSESSMENT
77F with dementia, COPD with chronic respiratory failure s/p trach, cardiac arrest, CHH, quadriplegia, CVA, CKD, anemia, PEG tube, and multiple hospital admissions for respiratory distress presents to the ED BIBEMS from [facility] for diaphoresis and fever.       Sepsis 2/2 VAP - meets sepsis based on fever + tachycardia + source of infection.  Lactate 1.9  - cont with meropenem  - ID consult  - cc consult  - f/u cultures   - cont with vent settings to maintain SaO2 >92%  - cont with midodrine  - on ativan for agitation  - f/u legionella and strep PNA ag  - f/u MRSA swab  - palliative care consult    DM2 on insulin  - cont with lantus and insulin coverage scale with FS q6h while on TF    COPD   - cont with neb treatments    Anemia of chronic disease  with worsening hh 11/22  - obtained GI eval with dr baca 11/22  - cont with ferrous sulfate

## 2022-11-22 NOTE — PROGRESS NOTE ADULT - SUBJECTIVE AND OBJECTIVE BOX
Patient is a 77y old  Female who presents with a chief complaint of diaphoresis and fever (2022 06:22)      SUBJECTIVE / OVERNIGHT EVENTS: pt seen and examined. no fever, , NAD        Vital Signs Last 24 Hrs  T(C): 36.7 (2022 21:19), Max: 37.2 (2022 12:30)  T(F): 98 (2022 21:19), Max: 98.9 (2022 12:30)  HR: 66 (2022 08:30) (61 - 78)  BP: 95/60 (2022 08:30) (92/52 - 127/59)  BP(mean): 71 (2022 08:30) (65 - 89)  RR: 28 (2022 08:30) (13 - 31)  SpO2: 100% (2022 08:30) (60% - 100%)    Parameters below as of 2022 08:00  Patient On (Oxygen Delivery Method): ventilator    O2 Concentration (%): 50  I&O's Summary    2022 07:01  -  2022 07:00  --------------------------------------------------------  IN: 1074 mL / OUT: 475 mL / NET: 599 mL        PHYSICAL EXAM:  GENERAL: NAD  HEAD:  Atraumatic, Normocephalic  NECK: Supple  CHEST/LUNG: dec bs at bases  HEART: S1+S2  ABDOMEN: Soft, Nontender, Nondistended; Bowel sounds present  EXTREMITIES:  No edema  SKIN: No rashes or lesions    LABS:                        7.8    11.71 )-----------( 191      ( 2022 06:00 )             25.9     11-    137  |  102  |  25<H>  ----------------------------<  173<H>  3.7   |  30  |  0.99    Ca    7.7<L>      2022 06:00  Phos  2.9         TPro  6.4  /  Alb  1.8<L>  /  TBili  0.4  /  DBili  x   /  AST  25  /  ALT  12  /  AlkPhos  107  11-22    PT/INR - ( 2022 06:00 )   PT: 16.0 sec;   INR: 1.35 ratio         PTT - ( 2022 03:56 )  PTT:40.0 sec  CAPILLARY BLOOD GLUCOSE      POCT Blood Glucose.: 183 mg/dL (2022 07:36)  POCT Blood Glucose.: 113 mg/dL (2022 05:02)  POCT Blood Glucose.: 139 mg/dL (2022 00:24)  POCT Blood Glucose.: 133 mg/dL (2022 17:48)  POCT Blood Glucose.: 122 mg/dL (2022 11:34)  POCT Blood Glucose.: 146 mg/dL (2022 08:57)        Urinalysis Basic - ( 2022 04:25 )    Color: Yellow / Appearance: Clear / S.015 / pH: x  Gluc: x / Ketone: Trace  / Bili: Negative / Urobili: Negative mg/dL   Blood: x / Protein: 500 mg/dL / Nitrite: Negative   Leuk Esterase: Trace / RBC: 6-10 /HPF / WBC 3-5   Sq Epi: x / Non Sq Epi: Many / Bacteria: Few        RADIOLOGY & ADDITIONAL TESTS:    Imaging Personally Reviewed:  [x] YES  [ ] NO    Consultant(s) Notes Reviewed:  [x] YES  [ ] NO      MEDICATIONS  (STANDING):  albuterol    90 MICROgram(s) HFA Inhaler 2 Puff(s) Inhalation two times a day  chlorhexidine 0.12% Liquid 15 milliLiter(s) Oral Mucosa every 12 hours  chlorhexidine 2% Cloths 1 Application(s) Topical daily  dextrose 5%. 1000 milliLiter(s) (50 mL/Hr) IV Continuous <Continuous>  dextrose 5%. 1000 milliLiter(s) (100 mL/Hr) IV Continuous <Continuous>  dextrose 50% Injectable 25 Gram(s) IV Push once  dextrose 50% Injectable 12.5 Gram(s) IV Push once  dextrose 50% Injectable 25 Gram(s) IV Push once  glucagon  Injectable 1 milliGRAM(s) IntraMuscular once  heparin   Injectable 5000 Unit(s) SubCutaneous every 12 hours  insulin glargine Injectable (LANTUS) 8 Unit(s) SubCutaneous every morning  insulin lispro (ADMELOG) corrective regimen sliding scale   SubCutaneous three times a day before meals  LORazepam     Tablet 1 milliGRAM(s) Oral every 4 hours  midodrine 10 milliGRAM(s) Oral every 8 hours  norepinephrine Infusion 0.05 MICROgram(s)/kG/Min (5.35 mL/Hr) IV Continuous <Continuous>  pantoprazole  Injectable 40 milliGRAM(s) IV Push daily  polyethylene glycol 3350 17 Gram(s) Oral every 12 hours  senna 2 Tablet(s) Oral at bedtime  trimethoprim / sulfamethoxazole IVPB 285 milliGRAM(s) IV Intermittent every 8 hours    MEDICATIONS  (PRN):  dextrose Oral Gel 15 Gram(s) Oral once PRN Blood Glucose LESS THAN 70 milliGRAM(s)/deciliter  LORazepam   Injectable 2 milliGRAM(s) IV Push every 6 hours PRN Agitation  sodium chloride 0.9% lock flush 10 milliLiter(s) IV Push every 1 hour PRN Pre/post blood products, medications, blood draw, and to maintain line patency      Care Discussed with Consultants/Other Providers [x] YES  [ ] NO    HEALTH ISSUES - PROBLEM Dx:

## 2022-11-22 NOTE — PROGRESS NOTE ADULT - ASSESSMENT
77F with dementia, COPD with chronic respiratory failure s/p trach, cardiac arrest, CHH, quadriplegia, CVA, CKD, anemia, PEG tube, and multiple hospital admissions for respiratory distress presents to the ED BIBEMS from [facility] for diaphoresis and fever.     Patient was just discharged here from 11/12-11/16 for low grade fever, midline malfunction, leukocytosis, fever, concerning for sepsis possibly 2/2 recurent VAP.      Sepsis 2/2 Recurrent VAP  - febrile to 101.5F and tachycardic to 112 w/ leukocytosis of 16  - imaging reviewed  - infectious w/u pending   --MRSA swab negative   -- urine legionella negative   --BCx/UCx NGTD   -- sputum cx pending  Plan:   Pt w/ hx of MDROs, becoming increasingly difficult to eradicate with current Abx  Most recent cx w/ Stenotrophomonas, S to Bactrim and Minocycline  C/w IV Bactrim 285mg q8h based on TMX component  Isolation per infection control policy  Vent management per ICU    Infectious Diseases will continue to follow. Please call with any questions.   Andressa Brantley M.D.  [facility] [phone]

## 2022-11-22 NOTE — CONSULT NOTE ADULT - SUBJECTIVE AND OBJECTIVE BOX
HPI:  77F with dementia, COPD with chronic respiratory failure s/p trach, cardiac arrest, CHH, quadriplegia, CVA, CKD, anemia, PEG tube, and multiple hospital admissions for respiratory distress presents to the ED [facility] from [facility] for diaphoresis and fever.   Patient unable to provide any history.  Patient was just discharged here from 11/12-11/16 for low grade fever, midline malfunction, leukocytosis, fever, concerning for sepsis possibly 2/2 unresolved VAP.  Was treated with meropenem.  Per notes from [facility], around 3am, patient was does "not look good" and was noted to be diaphoretic, tachypneic, and febrile to 100.9F.  Was sent here for further evaluation.  In the ED, patient was febrile to 101.5F and tachycardic to 112.  Labs showed leukocytosis of 16 (up from 6.5), hyperkalemia 5.4, ABG 7.59/36/273.  Patient is being readmitted for sepsis 2/2 presumably from VAP.   (21 Nov 2022 04:55) Chronic Gluteal fold unstageable pressure injury 4 x 3.5 x 2.5 tan slough  probe to the [id number]% scant/moderate serosanguinous drainage no odor, warmth, induration, fluctuance, periwound erythema, maceration:,   2. Sacral area scar tissue likely a  resolved stage 3/4 pressure injury  3. Left hallux unstageable pressure injury 1.5 x 1.5 dry, periwound dry mild erythema  4. Right hallux unstageable pressure injury 1.5 x 1.5 dry, periwound dry mild erythema         PAST MEDICAL & SURGICAL HISTORY:  Dementia of frontal lobe type      Aphasic stroke      Diabetes mellitus      Respiratory failure      Hypertension      GERD (gastroesophageal reflux disease)      Constipation      Respiratory failure      CVA (cerebral vascular accident)      HTN (hypertension)      DM (diabetes mellitus)      Advanced dementia      COVID-19 virus detected      Quadriplegia      Pneumonia      Type II diabetes mellitus      Hx of appendectomy      Gastrostomy in place      Tracheostomy in place      Tracheostomy tube present      Feeding by G-tube        REVIEW OF SYSTEMS  Unable to obtain ROS  2/2 non-verbal status      MEDICATIONS  (STANDING):  albuterol    90 MICROgram(s) HFA Inhaler 2 Puff(s) Inhalation two times a day  chlorhexidine 0.12% Liquid 15 milliLiter(s) Oral Mucosa every 12 hours  chlorhexidine 2% Cloths 1 Application(s) Topical daily  dextrose 5%. 1000 milliLiter(s) (50 mL/Hr) IV Continuous <Continuous>  dextrose 5%. 1000 milliLiter(s) (100 mL/Hr) IV Continuous <Continuous>  dextrose 50% Injectable 25 Gram(s) IV Push once  dextrose 50% Injectable 12.5 Gram(s) IV Push once  dextrose 50% Injectable 25 Gram(s) IV Push once  glucagon  Injectable 1 milliGRAM(s) IntraMuscular once  heparin   Injectable 5000 Unit(s) SubCutaneous every 12 hours  insulin glargine Injectable (LANTUS) 8 Unit(s) SubCutaneous every morning  insulin lispro (ADMELOG) corrective regimen sliding scale   SubCutaneous three times a day before meals  LORazepam     Tablet 1 milliGRAM(s) Oral every 4 hours  midodrine 10 milliGRAM(s) Oral every 8 hours  norepinephrine Infusion 0.05 MICROgram(s)/kG/Min (5.35 mL/Hr) IV Continuous <Continuous>  pantoprazole  Injectable 40 milliGRAM(s) IV Push daily  polyethylene glycol 3350 17 Gram(s) Oral every 12 hours  senna 2 Tablet(s) Oral at bedtime  trimethoprim / sulfamethoxazole IVPB 285 milliGRAM(s) IV Intermittent every 8 hours    MEDICATIONS  (PRN):  dextrose Oral Gel 15 Gram(s) Oral once PRN Blood Glucose LESS THAN 70 milliGRAM(s)/deciliter  LORazepam   Injectable 2 milliGRAM(s) IV Push every 6 hours PRN Agitation  sodium chloride 0.9% lock flush 10 milliLiter(s) IV Push every 1 hour PRN Pre/post blood products, medications, blood draw, and to maintain line patency      Allergies    codeine (Hives)    Intolerances        SOCIAL HISTORY:      FAMILY HISTORY:  No pertinent family history in first degree relatives        Vital Signs Last 24 Hrs  T(C): 36.7 (21 Nov 2022 21:19), Max: 37.2 (21 Nov 2022 12:30)  T(F): 98 (21 Nov 2022 21:19), Max: 98.9 (21 Nov 2022 12:30)  HR: 63 (22 Nov 2022 11:30) (61 - 91)  BP: 95/48 (22 Nov 2022 11:30) (86/48 - 157/93)  BP(mean): 63 (22 Nov 2022 11:30) (59 - 111)  RR: 18 (22 Nov 2022 11:30) (13 - 34)  SpO2: 100% (22 Nov 2022 11:30) (60% - 100%)    Parameters below as of 22 Nov 2022 10:00  Patient On (Oxygen Delivery Method): ventilator    O2 Concentration (%): 50                              7.8    11.71 )-----------( 191      ( 22 Nov 2022 06:00 )             25.9     11-22    137  |  102  |  25<H>  ----------------------------<  173<H>  3.7   |  30  |  0.99    Ca    7.7<L>      22 Nov 2022 06:00  Phos  2.9     11-22    TPro  6.4  /  Alb  1.8<L>  /  TBili  0.4  /  DBili  x   /  AST  25  /  ALT  12  /  AlkPhos  107  11-22      A1C with Estimated Average Glucose Result: 8.5 % (10-19-22 @ 09:05)      PHYSICAL EXAM:    General: resting comfortably in bed; NAD  ENT: no nasal discharge;   Neck: trach in place/vented  Extremities: no gross deformities,  Dermatologic: chronic Gluteal fold unstageable pressure injury 4 x 3.5 x 2.5 tan slough  probe to the [id number]% scant/moderate serosanguinous drainage no odor, warmth, induration, fluctuance, periwound erythema, maceration:,  Left hallux unstageable pressure injury 1.5 x 1.5 dry, periwound dry mild erythema, Right hallux unstageable pressure injury 1.5 x 1.5 dry, periwound dry mild erythema  Neurologic: Can not follow commands  Musculoskeletal/Vascular: Foot drop

## 2022-11-22 NOTE — PROGRESS NOTE ADULT - SUBJECTIVE AND OBJECTIVE BOX
Patient is a 77y old  Female who presents with a chief complaint of fever    BRIEF HOSPITAL COURSE:   76 y/o Full code F with pmhx of dementia, COPD with chronic respiratory failure, S/p Trach and peg ( Vent Dependent), Cardiac arrest, quadriplegia, CVA, CKD, Anemia, and multiple admissions to hospital ( twice in past 6 weeks) including being discharged on  with VAP presents to ED from [facility] with fevers and abnormal labs". Lab work significant for WBC 16 with left shift, Scr 1.25 (baseline around 1.15), K+ 5.4, Lactate 2.9. CXR appears to show worsening infiltrate in R lung with unchanged pleural effusion on nonofficial read. Hypoxic RF with Fio2 requirements at 50 %/ 18/400/5.     Past 24 hours  Titrated off levophed. Restarted trickle tube feeds as tolerated. on 60 % Fio2      PAST MEDICAL & SURGICAL HISTORY:  Dementia of frontal lobe type      Aphasic stroke      Diabetes mellitus      Respiratory failure      Hypertension      GERD (gastroesophageal reflux disease)      Constipation      Respiratory failure      CVA (cerebral vascular accident)      HTN (hypertension)      DM (diabetes mellitus)      Advanced dementia      COVID-19 virus detected      Quadriplegia      Pneumonia      Type II diabetes mellitus      Hx of appendectomy      Gastrostomy in place      Tracheostomy in place      Tracheostomy tube present      Feeding by G-tube        Allergies    codeine (Hives)    Intolerances      FAMILY HISTORY:  No pertinent family history in first degree relatives        Review of Systems:  Negative 2/2 to cognition       Medications:  meropenem  IVPB 1000 milliGRAM(s) IV Intermittent Once  trimethoprim   80 mG/sulfamethoxazole 400 mG 1 Tablet(s) Oral two times a day  vancomycin  IVPB      vancomycin  IVPB 750 milliGRAM(s) IV Intermittent once  vancomycin  IVPB 750 milliGRAM(s) IV Intermittent every 12 hours      albuterol    90 MICROgram(s) HFA Inhaler 2 Puff(s) Inhalation two times a day    LORazepam     Tablet 1 milliGRAM(s) Oral every 4 hours      heparin   Injectable 5000 Unit(s) SubCutaneous every 12 hours    pantoprazole  Injectable 40 milliGRAM(s) IV Push daily  polyethylene glycol 3350 17 Gram(s) Oral every 12 hours  senna 2 Tablet(s) Oral at bedtime      dextrose 50% Injectable 25 Gram(s) IV Push once  dextrose 50% Injectable 12.5 Gram(s) IV Push once  dextrose 50% Injectable 25 Gram(s) IV Push once  dextrose Oral Gel 15 Gram(s) Oral once PRN  glucagon  Injectable 1 milliGRAM(s) IntraMuscular once  insulin glargine Injectable (LANTUS) 8 Unit(s) SubCutaneous every morning  insulin lispro (ADMELOG) corrective regimen sliding scale   SubCutaneous three times a day before meals    dextrose 5%. 1000 milliLiter(s) IV Continuous <Continuous>  dextrose 5%. 1000 milliLiter(s) IV Continuous <Continuous>      chlorhexidine 0.12% Liquid 15 milliLiter(s) Oral Mucosa every 12 hours  chlorhexidine 4% Liquid 1 Application(s) Topical <User Schedule>    sodium zirconium cyclosilicate 10 Gram(s) Oral once      Mode: AC/ CMV (Assist Control/ Continuous Mandatory Ventilation)  RR (machine): 18  TV (machine): 400  FiO2: 100  PEEP: 5  ITime: 0.8  MAP: 21  PIP: 45      ICU Vital Signs Last 24 Hrs  T(C): 38.6 (2022 03:32), Max: 38.6 (2022 03:32)  T(F): 101.5 (2022 03:32), Max: 101.5 (2022 03:32)  HR: 102 (2022 03:45) (102 - 112)  BP: 129/79 (2022 03:32) (129/79 - 129/79)  BP(mean): --  ABP: --  ABP(mean): --  RR: 22 (2022 03:32) (22 - 22)  SpO2: 100% (2022 03:45) (97% - 100%)    O2 Parameters below as of 2022 03:32  Patient On (Oxygen Delivery Method): room air          Vital Signs Last 24 Hrs  T(C): 38.6 (2022 03:32), Max: 38.6 (2022 03:32)  T(F): 101.5 (2022 03:32), Max: 101.5 (2022 03:32)  HR: 102 (2022 03:45) (102 - 112)  BP: 129/79 (2022 03:32) (129/79 - 129/79)  BP(mean): --  RR: 22 (2022 03:32) (22 - 22)  SpO2: 100% (2022 03:45) (97% - 100%)    Parameters below as of 2022 03:32  Patient On (Oxygen Delivery Method): room air        ABG - ( 2022 04:09 )  pH, Arterial: 7.59  pH, Blood: x     /  pCO2: 36    /  pO2: 273   / HCO3: 34    / Base Excess: 12.8  /  SaO2: 99.8                I&O's Detail        LABS:                        9.6    16.01 )-----------( 290      ( 2022 03:56 )             31.7     11    143  |  101  |  35<H>  ----------------------------<  124<H>  5.4<H>   |  31  |  1.25    Ca    9.4      2022 03:56    TPro  8.6<H>  /  Alb  2.6<L>  /  TBili  0.6  /  DBili  x   /  AST  41<H>  /  ALT  20  /  AlkPhos  128<H>  11-21          CAPILLARY BLOOD GLUCOSE        PT/INR - ( 2022 03:56 )   PT: 14.4 sec;   INR: 1.22 ratio         PTT - ( 2022 03:56 )  PTT:40.0 sec  Urinalysis Basic - ( 2022 04:25 )    Color: Yellow / Appearance: Clear / S.015 / pH: x  Gluc: x / Ketone: Trace  / Bili: Negative / Urobili: Negative mg/dL   Blood: x / Protein: 500 mg/dL / Nitrite: Negative   Leuk Esterase: Trace / RBC: x / WBC x   Sq Epi: x / Non Sq Epi: x / Bacteria: x      CULTURES:      Physical Examination:    General: Vented. No acute distress    Neuro: At baseline mental status, arousable, A O X1. Cn2-12 intact    HEENT: Pupils equal, reactive to light.  Symmetric.    PULM: Rhonchi heard B/l. No increased work of breathing     CVS: Regular rate and rhythm, no murmurs, rubs, or gallops    ABD: Soft, nondistended, nontender, normoactive bowel sounds, no masses    EXT: No edema, nontender    SKIN: Warm and well perfused, no rashes noted.

## 2022-11-22 NOTE — CONSULT NOTE ADULT - ASSESSMENT
Patient presents with   1.  Chronic Gluteal fold unstageable pressure injury 4 x 3.5 x 2.5 tan slough  probe to the [id number]% scant/moderate serosanguinous drainage no odor, warmth, induration, fluctuance, periwound erythema, maceration:,   recommendation:   -aquacel daily and PRN soiling  2. Sacral area scar tissue likely a  resolved stage 3/4 pressure injury  -as per prevention protocol  3. Left hallux unstageable pressure injury 1.5 x 1.5 dry, periwound dry mild erythema  recommendation:   -Continue Betadine daily  4. Right hallux unstageable pressure injury 1.5 x 1.5 dry, periwound dry mild erythema  recommendation:   -Continue Betadine daily  Patient is on a low air loss mattress  recommendation:   -microclimate bed  for the critically ill patient experiencing multiorgan failure, impaired tissue oxygenation, and perfusion can contribute to skin failure thus the development of a pressure related injury may be unavoidable    This pressure injury is community acquired/YES  At risk for altered tissue perfusion /YES  Impaired perfusion of peripheral tissue /YES  Continue  Nutrition (as tolerated)  Continue  Offloading   Continue Pericare  Apply cair boots at all times while in bed.   Provide skin checks and foot placement q8h.  Care as per medicine will follow w/ you  Follow up as [facility]   Findings and recommendations discussed with BRANDEN gage  Thank you for this consult  Ana Luisa Cantu NP, [facility] [phone]

## 2022-11-23 LAB
-  AMIKACIN: SIGNIFICANT CHANGE UP
-  AMPICILLIN/SULBACTAM: SIGNIFICANT CHANGE UP
-  CEFEPIME: SIGNIFICANT CHANGE UP
-  CEFTAZIDIME: SIGNIFICANT CHANGE UP
-  CIPROFLOXACIN: SIGNIFICANT CHANGE UP
-  GENTAMICIN: SIGNIFICANT CHANGE UP
-  IMIPENEM: SIGNIFICANT CHANGE UP
-  LEVOFLOXACIN: SIGNIFICANT CHANGE UP
-  MEROPENEM: SIGNIFICANT CHANGE UP
-  PIPERACILLIN/TAZOBACTAM: SIGNIFICANT CHANGE UP
-  TOBRAMYCIN: SIGNIFICANT CHANGE UP
-  TRIMETHOPRIM/SULFAMETHOXAZOLE: SIGNIFICANT CHANGE UP
HCT VFR BLD CALC: 25.3 % — LOW (ref 34.5–45)
HGB BLD-MCNC: 7.7 G/DL — LOW (ref 11.5–15.5)
MCHC RBC-ENTMCNC: 27.6 PG — SIGNIFICANT CHANGE UP (ref 27–34)
MCHC RBC-ENTMCNC: 30.4 GM/DL — LOW (ref 32–36)
MCV RBC AUTO: 90.7 FL — SIGNIFICANT CHANGE UP (ref 80–100)
METHOD TYPE: SIGNIFICANT CHANGE UP
METHOD TYPE: SIGNIFICANT CHANGE UP
NRBC # BLD: 0 /100 WBCS — SIGNIFICANT CHANGE UP (ref 0–0)
PLATELET # BLD AUTO: 181 K/UL — SIGNIFICANT CHANGE UP (ref 150–400)
RBC # BLD: 2.79 M/UL — LOW (ref 3.8–5.2)
RBC # FLD: 17.8 % — HIGH (ref 10.3–14.5)
WBC # BLD: 8.82 K/UL — SIGNIFICANT CHANGE UP (ref 3.8–10.5)
WBC # FLD AUTO: 8.82 K/UL — SIGNIFICANT CHANGE UP (ref 3.8–10.5)

## 2022-11-23 PROCEDURE — 99233 SBSQ HOSP IP/OBS HIGH 50: CPT

## 2022-11-23 RX ORDER — INSULIN LISPRO 100/ML
VIAL (ML) SUBCUTANEOUS EVERY 6 HOURS
Refills: 0 | Status: DISCONTINUED | OUTPATIENT
Start: 2022-11-23 | End: 2022-12-08

## 2022-11-23 RX ADMIN — Medication 1 MILLIGRAM(S): at 18:14

## 2022-11-23 RX ADMIN — Medication 1 MILLIGRAM(S): at 06:03

## 2022-11-23 RX ADMIN — Medication 1 APPLICATION(S): at 13:34

## 2022-11-23 RX ADMIN — Medication 1 MILLIGRAM(S): at 01:47

## 2022-11-23 RX ADMIN — CHLORHEXIDINE GLUCONATE 15 MILLILITER(S): 213 SOLUTION TOPICAL at 18:14

## 2022-11-23 RX ADMIN — INSULIN GLARGINE 8 UNIT(S): 100 INJECTION, SOLUTION SUBCUTANEOUS at 08:17

## 2022-11-23 RX ADMIN — Medication 2 MILLIGRAM(S): at 18:37

## 2022-11-23 RX ADMIN — Medication 517.81 MILLIGRAM(S): at 07:23

## 2022-11-23 RX ADMIN — HEPARIN SODIUM 5000 UNIT(S): 5000 INJECTION INTRAVENOUS; SUBCUTANEOUS at 18:14

## 2022-11-23 RX ADMIN — Medication 1 MILLIGRAM(S): at 10:19

## 2022-11-23 RX ADMIN — CHLORHEXIDINE GLUCONATE 15 MILLILITER(S): 213 SOLUTION TOPICAL at 06:03

## 2022-11-23 RX ADMIN — ALBUTEROL 2 PUFF(S): 90 AEROSOL, METERED ORAL at 20:38

## 2022-11-23 RX ADMIN — MIDODRINE HYDROCHLORIDE 15 MILLIGRAM(S): 2.5 TABLET ORAL at 13:48

## 2022-11-23 RX ADMIN — PANTOPRAZOLE SODIUM 40 MILLIGRAM(S): 20 TABLET, DELAYED RELEASE ORAL at 11:16

## 2022-11-23 RX ADMIN — Medication 1 MILLIGRAM(S): at 21:18

## 2022-11-23 RX ADMIN — Medication 2 MILLIGRAM(S): at 11:15

## 2022-11-23 RX ADMIN — SENNA PLUS 2 TABLET(S): 8.6 TABLET ORAL at 21:17

## 2022-11-23 RX ADMIN — ALBUTEROL 2 PUFF(S): 90 AEROSOL, METERED ORAL at 06:30

## 2022-11-23 RX ADMIN — Medication 1 MILLIGRAM(S): at 13:48

## 2022-11-23 RX ADMIN — CHLORHEXIDINE GLUCONATE 1 APPLICATION(S): 213 SOLUTION TOPICAL at 11:16

## 2022-11-23 RX ADMIN — MIDODRINE HYDROCHLORIDE 15 MILLIGRAM(S): 2.5 TABLET ORAL at 06:03

## 2022-11-23 RX ADMIN — POLYETHYLENE GLYCOL 3350 17 GRAM(S): 17 POWDER, FOR SOLUTION ORAL at 06:01

## 2022-11-23 RX ADMIN — HEPARIN SODIUM 5000 UNIT(S): 5000 INJECTION INTRAVENOUS; SUBCUTANEOUS at 06:02

## 2022-11-23 RX ADMIN — Medication 517.81 MILLIGRAM(S): at 14:12

## 2022-11-23 RX ADMIN — Medication 517.81 MILLIGRAM(S): at 21:24

## 2022-11-23 RX ADMIN — MIDODRINE HYDROCHLORIDE 15 MILLIGRAM(S): 2.5 TABLET ORAL at 21:18

## 2022-11-23 NOTE — PROGRESS NOTE ADULT - ASSESSMENT
REVIEW OF SYMPTOMS      Able to give (reliable) ROS  NO     PHYSICAL EXAM    HEENT Unremarkable  atraumatic   RESP Fair air entry EXP prolonged    Harsh breath sound Resp distres mild   CARDIAC S1 S2 No S3     NO JVD    ABDOMEN SOFT BS PRESENT NOT DISTENDED No hepatosplenomegaly   PEDAL EDEMA present No calf tenderness  NO rash       GENERAL DATA .   GOC.  full code      ALLGY.     codeine                        WT. 11/21/2022 57  BMI.    11/21/2022 21                          ICU STAY.   admitted ICU 11/21/2022  COVID.  Scv2 11/21/2022 scv2 (-)    PROCEDURE.  11/21/2022 [facility] central line    BEST PRACTICE ISSUES.    HOB ELEVATN. Yes  DVT PPLX.  11/21/2022 hpsc    SQUIRES PPLX.    11/21/2022 protonix 40   INFN PPLX.    11/21/2022 chlorhexidine .12% bid (mrsa)   11/21/2022 chlorhexidine 2% (c li)   SP SW EM.         DIET.  11/21/2022 glucerna 1.5 1000 gt      VS/ PO/IO/ VENT/ DRIPS.  11/23/2022 55 130/90   11/23/2022 6p nore .05 m/k/m   11/23/2022 ac 18/350/5/.5      CURRENT ADMISSION  Pt sent back from [facility] 11/21/2022 with fever abn labs Found yto be in shock Norepi started 11/21/2022   Pulm crit care consulted      PROBLEMS.  Vent dependent   .. Trach poa 11/21/2022  RIJ 11/21/2022   INFECTN   .. VAP 11/21/2022  .. Decub ulcers   ...... Bactrim tid  11/21/2022  COPD  Shock   .. Norepi 11/21/2022  .. midodrine 11/21/2022   peg poa 11/21/2022  ANEMIA       ASSESSMENT/RECOMMENDATIONS .   RESP.  .. Gas exchange.  11/21/2022 4a On 100% vent 759/36/273   Monitor & target po 90-95%  .. VENT MANAGEMENT.   HOB elevation  Target Pplat 30 (-)  Target PO 90-95%  Target pH 730 (+)  Daily spontaneous breathing trials   Daily sedation vacation   .. Pleural effsn .   CXR 11/21/2022 r gr than l effsn  Effusion has been tapped during prev admissions and is lp exudate If fever or worsening sepsis will plan thorac  .. Bronchospasm.  11/21/2022 albut hfa 2p bid   INFECTION.  .. SCV2 status.  Scv2 11/21/2022 scv2 (-)  .. VAP   w 11/21-11/23/2022 w 16 - 8.8  Pr 11/21-11/22/2022 pr 1.8 - 1.1  ua 11/21/2022 w 3-5   cxr 11/21 mod to large r effsn somewhat incr from 11/12 central infiltrates possibly congestive rij   rvp 11/21/2022 (-)   rsv 11/21/2022 (-)   uc 11/21 (-)   legn 11/21 (-)   mrsa 11/21 (-)   bc 11/21 (-)   11/21/2022 bactrim 285 mg trimeth q 8 h Dr BRITTANY Brantley   CARDIAC.  .. Shock.    11/21/2022 midodrine 10.3 -> 11/22 midodr 15.3   11/21/2022 5a norepi 8 in 250   target map 65 (+)   .. ekg changes.  ekg 11/21/2022 s tach shoirt pr qtc 470 possible rvh   tr 11/22/2022 tr 28   GI.  .. Nutrition.   11/21/2022 glucerna 1.5 1000 gt    HEMAT.  .. DVT pplx.   11/21/2022 hpsc    .. anemia.  Hb 11/21-11/22-11/23/2022 Hb 9.6- 7.8 - 7.7    mcv 11/21/2022 mcv 90   inr 11/21/2022 inr 122   monitor target Hb 7 (+)   RENAL.  .. renal parameters.  Na 11/21/2022 Na 143   K 11/21-11/22/2022 K 5.4 - 3.7  CO2 11/21/2022 co2 31   Cr 11/21/2022 Cr 1.2   ag 11/21/2022 ag 11  alb 11/21/2022 alb 2.8   11/21/2022 lokelma 10 givn   IV fl.   ENDOCRINE.   .. DM   11/21/2022 glarg 8 q am   11/21/2022 riss   NEURO.  .. seizures.  11/21/2022 lorazepam 1.6     TIME SPENT   Over 39 minutes aggregate critical care time spent on encounter; activities included   direct patient care, counseling and/or coordinating care reviewing notes, lab data/ imaging , discussion with multidisciplinary team/ patient  /family and explaining in detail risks, benefits, alternatives  of the recommendations     CHAPINCITO GUIDRY 76 f [facility] S 11/21/2022   DR JOSEPH DU

## 2022-11-23 NOTE — PROGRESS NOTE ADULT - SUBJECTIVE AND OBJECTIVE BOX
INTERVAL HPI/OVERNIGHT EVENTS:    MEDICATIONS  (STANDING):  albuterol    90 MICROgram(s) HFA Inhaler 2 Puff(s) Inhalation two times a day  chlorhexidine 0.12% Liquid 15 milliLiter(s) Oral Mucosa every 12 hours  chlorhexidine 2% Cloths 1 Application(s) Topical daily  dextrose 5%. 1000 milliLiter(s) (50 mL/Hr) IV Continuous <Continuous>  dextrose 5%. 1000 milliLiter(s) (100 mL/Hr) IV Continuous <Continuous>  dextrose 50% Injectable 25 Gram(s) IV Push once  dextrose 50% Injectable 12.5 Gram(s) IV Push once  dextrose 50% Injectable 25 Gram(s) IV Push once  glucagon  Injectable 1 milliGRAM(s) IntraMuscular once  heparin   Injectable 5000 Unit(s) SubCutaneous every 12 hours  insulin glargine Injectable (LANTUS) 8 Unit(s) SubCutaneous every morning  insulin lispro (ADMELOG) corrective regimen sliding scale   SubCutaneous every 6 hours  LORazepam     Tablet 1 milliGRAM(s) Oral every 4 hours  midodrine 15 milliGRAM(s) Oral every 8 hours  norepinephrine Infusion 0.05 MICROgram(s)/kG/Min (5.35 mL/Hr) IV Continuous <Continuous>  pantoprazole  Injectable 40 milliGRAM(s) IV Push daily  polyethylene glycol 3350 17 Gram(s) Oral every 12 hours  povidone iodine 10% Solution 1 Application(s) Topical daily  senna 2 Tablet(s) Oral at bedtime  trimethoprim / sulfamethoxazole IVPB 285 milliGRAM(s) IV Intermittent every 8 hours    MEDICATIONS  (PRN):  dextrose Oral Gel 15 Gram(s) Oral once PRN Blood Glucose LESS THAN 70 milliGRAM(s)/deciliter  LORazepam   Injectable 2 milliGRAM(s) IV Push every 6 hours PRN Agitation  sodium chloride 0.9% lock flush 10 milliLiter(s) IV Push every 1 hour PRN Pre/post blood products, medications, blood draw, and to maintain line patency      Allergies    codeine (Hives)    Intolerances        Review of Systems:    General:  No wt loss, fevers, chills, night sweats,fatigue,   Eyes:  Good vision, no reported pain  ENT:  No sore throat, pain, runny nose, dysphagia  CV:  No pain, palpitatioins, hypo/hypertension  Resp:  No dyspnea, cough, tachypnea, wheezing  GI:  No pain, No nausea, No vomiting, No diarrhea, No constipatiion, No weight loss, No fever, No pruritis, No rectal bleeding, No tarry stools, No dysphagia,  :  No pain, bleeding, incontinence, nocturia  Muscle:  No pain, weakness  Neuro:  No weakness, tingling, memory problems  Psych:  No fatigue, insomnia, mood problems, depression  Endocrine:  No polyuria, polydypsia, cold/heat intolerance  Heme:  No petechiae, ecchymosis, easy bruisability  Skin:  No rash, tattoos, scars, edema      Vital Signs Last 24 Hrs  T(C): 36.7 (22 Nov 2022 21:17), Max: 36.9 (22 Nov 2022 15:39)  T(F): 98 (22 Nov 2022 21:17), Max: 98.4 (22 Nov 2022 15:39)  HR: 63 (23 Nov 2022 10:45) (51 - 68)  BP: 100/55 (23 Nov 2022 10:45) (85/64 - 130/61)  BP(mean): 69 (23 Nov 2022 10:45) (61 - 91)  RR: 20 (23 Nov 2022 10:45) (15 - 28)  SpO2: 82% (23 Nov 2022 10:45) (82% - 100%)    Parameters below as of 23 Nov 2022 08:00  Patient On (Oxygen Delivery Method): ventilator    O2 Concentration (%): 50    PHYSICAL EXAM:    Constitutional: NAD, well-developed  HEENT: EOMI, throat clear  Neck: No LAD, supple  Respiratory: CTA and P  Cardiovascular: S1 and S2, RRR, no M  Gastrointestinal: BS+, soft, NT/ND, neg HSM,  Extremities: No peripheral edema, neg clubing, cyanosis  Vascular: 2+ peripheral pulses  Neurological: A/O x 3, no focal deficits  Psychiatric: Normal mood, normal affect  Skin: No rashes      LABS:                        7.8    11.71 )-----------( 191      ( 22 Nov 2022 06:00 )             25.9     11-22    137  |  102  |  25<H>  ----------------------------<  173<H>  3.7   |  30  |  0.99    Ca    7.7<L>      22 Nov 2022 06:00  Phos  2.9     11-22    TPro  6.4  /  Alb  1.8<L>  /  TBili  0.4  /  DBili  x   /  AST  25  /  ALT  12  /  AlkPhos  107  11-22    PT/INR - ( 22 Nov 2022 06:00 )   PT: 16.0 sec;   INR: 1.35 ratio               RADIOLOGY & ADDITIONAL TESTS:

## 2022-11-23 NOTE — PROGRESS NOTE ADULT - SUBJECTIVE AND OBJECTIVE BOX
BREA BECKHAM     [facility] 03    Allergies    codeine (Hives)    Intolerances        PAST MEDICAL & SURGICAL HISTORY:  Dementia of frontal lobe type      Aphasic stroke      Diabetes mellitus      Respiratory failure      Hypertension      GERD (gastroesophageal reflux disease)      Constipation      Respiratory failure      CVA (cerebral vascular accident)      HTN (hypertension)      DM (diabetes mellitus)      Advanced dementia      COVID-19 virus detected      Quadriplegia      Pneumonia      Type II diabetes mellitus      Hx of appendectomy      Gastrostomy in place      Tracheostomy in place      Tracheostomy tube present      Feeding by G-tube          FAMILY HISTORY:  No pertinent family history in first degree relatives        Home Medications:  Admelog 100 units/mL injectable solution: injectable every 6 hours SS (21 Nov 2022 05:08)  albuterol 90 mcg/inh inhalation aerosol with adapter: 2  inhaled every 6 hours (21 Nov 2022 04:24)  Bacid (LAC) oral tablet: 2 tab(s) by gastrostomy tube once a day (21 Nov 2022 04:24)  bacitracin 500 units/g topical ointment: Apply topically to affected area once a day to knees (21 Nov 2022 04:24)  chlorhexidine 0.12% mucous membrane liquid: 15 milliliter(s) mucous membrane 2 times a day (21 Nov 2022 04:24)  Dakins Full Strength 0.5% topical solution: Apply topically to affected area 2 times a day then apply santyl and calcium alginate (21 Nov 2022 04:24)  Eucerin topical cream: Apply topically to affected area once a day bilateral feet (21 Nov 2022 04:24)  ferrous sulfate 325 mg (65 mg elemental iron) oral tablet: 1 tab(s) by gastrostomy tube once a day (21 Nov 2022 04:24)  insulin glargine 100 units/mL subcutaneous solution: 8 unit(s) subcutaneous once a day (in the morning) (21 Nov 2022 04:24)  ipratropium-albuterol 0.5 mg-2.5 mg/3 mL inhalation solution: 3 milliliter(s) inhaled every 6 hours (21 Nov 2022 04:24)  LORazepam 1 mg oral tablet: 1 tab(s) by gastrostomy tube every 4 hours (21 Nov 2022 04:24)  midodrine 10 mg oral tablet: 1 tab(s) by gastrostomy tube every 8 hours (21 Nov 2022 04:24)  MiraLax oral powder for reconstitution: orally once a day (at bedtime) (21 Nov 2022 05:08)  mulivitamin: by gastrostomy tube once a day (21 Nov 2022 04:24)  nystatin 100,000 units/g topical powder: 1 application topically 3 times a day (21 Nov 2022 04:24)  omeprazole 40 mg oral delayed release capsule: 1 cap(s) by gastrostomy tube 2 times a day (21 Nov 2022 04:24)  polyethylene glycol 3350 oral powder for reconstitution: 17 gram(s) by gastrostomy tube every 12 hours (21 Nov 2022 04:24)  senna 8.6 mg oral tablet: 3 tab(s) by gastrostomy tube once a day (at bedtime) (21 Nov 2022 04:24)  simethicone 80 mg oral tablet, chewable: 1 tab(s) by gastrostomy tube every 6 hours (21 Nov 2022 04:24)  sucralfate 1 g/10 mL oral suspension: 10 milliliter(s) g-tube 4 times a day (before meals and at bedtime) (21 Nov 2022 04:24)  Tylenol 325 mg oral tablet: 2 tab(s) orally every 6 hours, As Needed prior to dressing change (21 Nov 2022 04:24)      MEDICATIONS  (STANDING):  albuterol    90 MICROgram(s) HFA Inhaler 2 Puff(s) Inhalation two times a day  chlorhexidine 0.12% Liquid 15 milliLiter(s) Oral Mucosa every 12 hours  chlorhexidine 2% Cloths 1 Application(s) Topical daily  dextrose 5%. 1000 milliLiter(s) (50 mL/Hr) IV Continuous <Continuous>  dextrose 5%. 1000 milliLiter(s) (100 mL/Hr) IV Continuous <Continuous>  dextrose 50% Injectable 25 Gram(s) IV Push once  dextrose 50% Injectable 12.5 Gram(s) IV Push once  dextrose 50% Injectable 25 Gram(s) IV Push once  glucagon  Injectable 1 milliGRAM(s) IntraMuscular once  heparin   Injectable 5000 Unit(s) SubCutaneous every 12 hours  insulin glargine Injectable (LANTUS) 8 Unit(s) SubCutaneous every morning  insulin lispro (ADMELOG) corrective regimen sliding scale   SubCutaneous three times a day before meals  LORazepam     Tablet 1 milliGRAM(s) Oral every 4 hours  midodrine 15 milliGRAM(s) Oral every 8 hours  norepinephrine Infusion 0.05 MICROgram(s)/kG/Min (5.35 mL/Hr) IV Continuous <Continuous>  pantoprazole  Injectable 40 milliGRAM(s) IV Push daily  polyethylene glycol 3350 17 Gram(s) Oral every 12 hours  povidone iodine 10% Solution 1 Application(s) Topical daily  senna 2 Tablet(s) Oral at bedtime  trimethoprim / sulfamethoxazole IVPB 285 milliGRAM(s) IV Intermittent every 8 hours    MEDICATIONS  (PRN):  dextrose Oral Gel 15 Gram(s) Oral once PRN Blood Glucose LESS THAN 70 milliGRAM(s)/deciliter  LORazepam   Injectable 2 milliGRAM(s) IV Push every 6 hours PRN Agitation  sodium chloride 0.9% lock flush 10 milliLiter(s) IV Push every 1 hour PRN Pre/post blood products, medications, blood draw, and to maintain line patency      Diet, NPO with Tube Feed:   Tube Feeding Modality: Gastrostomy  Glucerna 1.5 Horacio  Total Volume for 24 Hours (mL): 1000  Continuous  Starting Tube Feed Rate mL per Hour: 10  Increase Tube Feed Rate by (mL): 10     Every 10 hours  Until Goal Tube Feed Rate (mL per Hour): 50  Tube Feed Duration (in Hours): 20  Tube Feed Start Time: 17:00  Free Water Flush  Pump   Rate (mL per Hour): 25   Frequency: Every Hour    Duration (Hours): 24 (11-21-22 @ 05:10) [Active]          Vital Signs Last 24 Hrs  T(C): 36.7 (22 Nov 2022 21:17), Max: 36.9 (22 Nov 2022 15:39)  T(F): 98 (22 Nov 2022 21:17), Max: 98.4 (22 Nov 2022 15:39)  HR: 64 (23 Nov 2022 09:30) (51 - 69)  BP: 130/61 (23 Nov 2022 09:30) (85/64 - 130/61)  BP(mean): 83 (23 Nov 2022 09:30) (61 - 91)  RR: 25 (23 Nov 2022 09:30) (15 - 29)  SpO2: 96% (23 Nov 2022 09:30) (86% - 100%)    Parameters below as of 23 Nov 2022 08:00  Patient On (Oxygen Delivery Method): ventilator    O2 Concentration (%): 50      11-22-22 @ 07:01  -  11-23-22 @ 07:00  --------------------------------------------------------  IN: 79.8 mL / OUT: 450 mL / NET: -370.2 mL        Mode: AC/ CMV (Assist Control/ Continuous Mandatory Ventilation), RR (machine): 18, TV (machine): 350, FiO2: 50, PEEP: 5, ITime: 1, MAP: 15, PIP: 32      LABS:                        7.8    11.71 )-----------( 191      ( 22 Nov 2022 06:00 )             25.9     11-22    137  |  102  |  25<H>  ----------------------------<  173<H>  3.7   |  30  |  0.99    Ca    7.7<L>      22 Nov 2022 06:00  Phos  2.9     11-22    TPro  6.4  /  Alb  1.8<L>  /  TBili  0.4  /  DBili  x   /  AST  25  /  ALT  12  /  AlkPhos  107  11-22    PT/INR - ( 22 Nov 2022 06:00 )   PT: 16.0 sec;   INR: 1.35 ratio                   WBC:  WBC Count: 11.71 K/uL (11-22 @ 06:00)  WBC Count: 16.01 K/uL (11-21 @ 03:56)      MICROBIOLOGY:  RECENT CULTURES:  11-21 Trach Asp Tracheal Aspirate XXXX   Moderate polymorphonuclear leukocytes per low power field  No Squamous epithelial cells per low power field  No organisms seen   Moderate Acinetobacter baumannii/nosocomialis group  Normal Respiratory Elvira present    11-21 Clean Catch Clean Catch (Midstream) XXXX XXXX   <10,000 CFU/mL Normal Urogenital Elvira    11-21 .Blood Blood-Peripheral XXXX XXXX   No growth to date.                PT/INR - ( 22 Nov 2022 06:00 )   PT: 16.0 sec;   INR: 1.35 ratio             Sodium:  Sodium, Serum: 137 mmol/L (11-22 @ 06:00)  Sodium, Serum: 143 mmol/L (11-21 @ 03:56)      0.99 mg/dL 11-22 @ 06:00  1.25 mg/dL 11-21 @ 03:56      Hemoglobin:  Hemoglobin: 7.8 g/dL (11-22 @ 06:00)  Hemoglobin: 9.6 g/dL (11-21 @ 03:56)      Platelets: Platelet Count - Automated: 191 K/uL (11-22 @ 06:00)  Platelet Count - Automated: 290 K/uL (11-21 @ 03:56)      LIVER FUNCTIONS - ( 22 Nov 2022 06:00 )  Alb: 1.8 g/dL / Pro: 6.4 g/dL / ALK PHOS: 107 U/L / ALT: 12 U/L DA / AST: 25 U/L / GGT: x                 RADIOLOGY & ADDITIONAL STUDIES:      MICROBIOLOGY:  RECENT CULTURES:  11-21 Trach Asp Tracheal Aspirate XXXX   Moderate polymorphonuclear leukocytes per low power field  No Squamous epithelial cells per low power field  No organisms seen   Moderate Acinetobacter baumannii/nosocomialis group  Normal Respiratory Elvira present    11-21 Clean Catch Clean Catch (Midstream) XXXX XXXX   <10,000 CFU/mL Normal Urogenital Elvira    11-21 .Blood Blood-Peripheral XXXX XXXX   No growth to date.

## 2022-11-23 NOTE — PROGRESS NOTE ADULT - ASSESSMENT
77F with dementia, COPD with chronic respiratory failure s/p trach, cardiac arrest, CHH, quadriplegia, CVA, CKD, anemia, PEG tube, and multiple hospital admissions for respiratory distress presents to the ED BIBEMS from [facility] for diaphoresis and fever.       Sepsis 2/2 VAP - meets sepsis based on fever + tachycardia + source of infection.  Lactate 1.9  - cont with meropenem  - ID consult  - cont with vent settings to maintain SaO2 >92%  - cont with midodrine, and on levo for pressure support   - on ativan for agitation  - MRSA neg  - palliative care consult    DM2 on insulin  - cont with lantus and insulin coverage scale with FS q6h while on TF    COPD   - cont with neb treatments    Anemia of chronic disease   - GI eval - conservative management   - cont with ferrous sulfate  - trend CBC    Prognosis grave, may need ethics eval      77F with dementia, COPD with chronic respiratory failure s/p trach, cardiac arrest, CHH, quadriplegia, CVA, CKD, anemia, PEG tube, and multiple hospital admissions for respiratory distress presents to the ED BIBEMS from [facility] for diaphoresis and fever.       Sepsis 2/2 VAP - meets sepsis based on fever + tachycardia + source of infection.  Lactate 1.9  - cont with meropenem  - ID consult  - cont with vent settings to maintain SaO2 >92%  - cont with midodrine, and on levo for pressure support   - on ativan for agitation  - MRSA neg, trach culture +acinetobacter   - palliative care consult    DM2 on insulin  - cont with lantus and insulin coverage scale with FS q6h while on TF    COPD   - cont with neb treatments    Anemia of chronic disease   - GI eval - conservative management   - cont with ferrous sulfate  - trend CBC    Prognosis grave, may need ethics eval

## 2022-11-23 NOTE — PROGRESS NOTE ADULT - SUBJECTIVE AND OBJECTIVE BOX
Date/Time Patient Seen:  		  Referring MD:   Data Reviewed	       Patient is a 77y old  Female who presents with a chief complaint of diaphoresis and fever (22 Nov 2022 11:56)      Subjective/HPI     PAST MEDICAL & SURGICAL HISTORY:  Dementia of frontal lobe type    Aphasic stroke    Diabetes mellitus    Respiratory failure    Hypertension    GERD (gastroesophageal reflux disease)    Constipation    Respiratory failure    CVA (cerebral vascular accident)    HTN (hypertension)    DM (diabetes mellitus)    Advanced dementia    COVID-19 virus detected    Quadriplegia    Pneumonia    Type II diabetes mellitus    Hx of appendectomy    Gastrostomy in place    Tracheostomy in place    Tracheostomy tube present    Feeding by G-tube          Medication list         MEDICATIONS  (STANDING):  albuterol    90 MICROgram(s) HFA Inhaler 2 Puff(s) Inhalation two times a day  chlorhexidine 0.12% Liquid 15 milliLiter(s) Oral Mucosa every 12 hours  chlorhexidine 2% Cloths 1 Application(s) Topical daily  dextrose 5%. 1000 milliLiter(s) (50 mL/Hr) IV Continuous <Continuous>  dextrose 5%. 1000 milliLiter(s) (100 mL/Hr) IV Continuous <Continuous>  dextrose 50% Injectable 25 Gram(s) IV Push once  dextrose 50% Injectable 12.5 Gram(s) IV Push once  dextrose 50% Injectable 25 Gram(s) IV Push once  glucagon  Injectable 1 milliGRAM(s) IntraMuscular once  heparin   Injectable 5000 Unit(s) SubCutaneous every 12 hours  insulin glargine Injectable (LANTUS) 8 Unit(s) SubCutaneous every morning  insulin lispro (ADMELOG) corrective regimen sliding scale   SubCutaneous three times a day before meals  LORazepam     Tablet 1 milliGRAM(s) Oral every 4 hours  midodrine 15 milliGRAM(s) Oral every 8 hours  norepinephrine Infusion 0.05 MICROgram(s)/kG/Min (5.35 mL/Hr) IV Continuous <Continuous>  pantoprazole  Injectable 40 milliGRAM(s) IV Push daily  polyethylene glycol 3350 17 Gram(s) Oral every 12 hours  povidone iodine 10% Solution 1 Application(s) Topical daily  senna 2 Tablet(s) Oral at bedtime  trimethoprim / sulfamethoxazole IVPB 285 milliGRAM(s) IV Intermittent every 8 hours    MEDICATIONS  (PRN):  dextrose Oral Gel 15 Gram(s) Oral once PRN Blood Glucose LESS THAN 70 milliGRAM(s)/deciliter  LORazepam   Injectable 2 milliGRAM(s) IV Push every 6 hours PRN Agitation  sodium chloride 0.9% lock flush 10 milliLiter(s) IV Push every 1 hour PRN Pre/post blood products, medications, blood draw, and to maintain line patency         Vitals log        ICU Vital Signs Last 24 Hrs  T(C): 36.7 (22 Nov 2022 21:17), Max: 36.9 (22 Nov 2022 15:39)  T(F): 98 (22 Nov 2022 21:17), Max: 98.4 (22 Nov 2022 15:39)  HR: 53 (23 Nov 2022 05:29) (51 - 91)  BP: 87/51 (23 Nov 2022 04:00) (85/64 - 157/93)  BP(mean): 63 (23 Nov 2022 04:00) (59 - 111)  ABP: --  ABP(mean): --  RR: 22 (23 Nov 2022 04:00) (17 - 34)  SpO2: 99% (23 Nov 2022 05:29) (78% - 100%)    O2 Parameters below as of 22 Nov 2022 20:07  Patient On (Oxygen Delivery Method): ventilator    O2 Concentration (%): 50         Mode: AC/ CMV (Assist Control/ Continuous Mandatory Ventilation)  RR (machine): 18  TV (machine): 350  FiO2: 50  PEEP: 5  ITime: 1  MAP: 15  PIP: 31      Input and Output:  I&O's Detail    21 Nov 2022 07:01  -  22 Nov 2022 07:00  --------------------------------------------------------  IN:    IV PiggyBack: 1000 mL    Norepinephrine: 78.2 mL  Total IN: 1078.2 mL    OUT:    Voided (mL): 475 mL  Total OUT: 475 mL    Total NET: 603.2 mL      22 Nov 2022 07:01  -  23 Nov 2022 06:03  --------------------------------------------------------  IN:    Norepinephrine: 79.8 mL  Total IN: 79.8 mL    OUT:  Total OUT: 0 mL    Total NET: 79.8 mL          Lab Data                        7.8    11.71 )-----------( 191      ( 22 Nov 2022 06:00 )             25.9     11-22    137  |  102  |  25<H>  ----------------------------<  173<H>  3.7   |  30  |  0.99    Ca    7.7<L>      22 Nov 2022 06:00  Phos  2.9     11-22    TPro  6.4  /  Alb  1.8<L>  /  TBili  0.4  /  DBili  x   /  AST  25  /  ALT  12  /  AlkPhos  107  11-22            Review of Systems	      Objective     Physical Examination    heart s1s2  lung dec BS  head nc      Pertinent Lab findings & Imaging      Annalise:  NO   Adequate UO     I&O's Detail    21 Nov 2022 07:01  -  22 Nov 2022 07:00  --------------------------------------------------------  IN:    IV PiggyBack: 1000 mL    Norepinephrine: 78.2 mL  Total IN: 1078.2 mL    OUT:    Voided (mL): 475 mL  Total OUT: 475 mL    Total NET: 603.2 mL      22 Nov 2022 07:01  -  23 Nov 2022 06:03  --------------------------------------------------------  IN:    Norepinephrine: 79.8 mL  Total IN: 79.8 mL    OUT:  Total OUT: 0 mL    Total NET: 79.8 mL               Discussed with:     Cultures:	        Radiology

## 2022-11-23 NOTE — PROGRESS NOTE ADULT - ASSESSMENT
77F with dementia, COPD with chronic respiratory failure s/p trach, cardiac arrest, CHH, quadriplegia, CVA, CKD, anemia, PEG tube, and multiple hospital admissions for respiratory distress presents to the ED BIBEMS from [facility] for diaphoresis and fever.     Patient was just discharged here from 11/12-11/16 for low grade fever, midline malfunction, leukocytosis, fever, concerning for sepsis possibly 2/2 recurent VAP.      Sepsis 2/2 Recurrent VAP  - febrile to 101.5F and tachycardic to 112 w/ leukocytosis of 16  - imaging reviewed  - infectious w/u reviewed   --MRSA swab negative   --urine legionella negative   --BCx/UCx NGTD   --sputum cx NOF/Acinetobacter-pending susceptibilities  Plan:  Pt w/ hx of MDROs, becoming increasingly difficult to eradicate with current Abx  Suspect resistance pattern similar to prior recovery of Acinetobacter in the toe   F/u above susceptibilities--may need to do dual therapy w/ polymyxin/tetracycline if organism resistant to multiple Abx  Most recent cx w/ Stenotrophomonas, S to Bactrim and Minocycline  C/w IV Bactrim 285mg q8h based on TMX component, x7 day course until 11/27  Isolation per infection control policy  Vent management per ICU    Poor prognosis  Continued [facility]    Infectious Diseases will continue to follow. Please call with any questions.   Anderssa Brantley M.D.  [facility] Division of Infectious Diseases [phone]

## 2022-11-23 NOTE — PROGRESS NOTE ADULT - ASSESSMENT
77F with dementia, COPD with chronic respiratory failure s/p trach, cardiac arrest, CHH, quadriplegia, CVA, CKD, anemia, PEG tube, and multiple hospital admissions for respiratory distress presents to the ED BIBEMS from [facility] for diaphoresis and fever.       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

## 2022-11-23 NOTE — PROGRESS NOTE ADULT - SUBJECTIVE AND OBJECTIVE BOX
[facility], Division of Infectious Diseases  MOIZ Lora Y. Patel, S. Shah, G. [facility]  [phone]    Name: BREA BECKHAM  Age: 77y  Gender: Female  MRN: [id number]    Interval History:  Patient seen and examined at bedside  No acute overnight events. Afebrile  Notes reviewed    Antibiotics:  trimethoprim / sulfamethoxazole IVPB 285 milliGRAM(s) IV Intermittent every 8 hours      Medications:  albuterol    90 MICROgram(s) HFA Inhaler 2 Puff(s) Inhalation two times a day  chlorhexidine 0.12% Liquid 15 milliLiter(s) Oral Mucosa every 12 hours  chlorhexidine 2% Cloths 1 Application(s) Topical daily  dextrose 5%. 1000 milliLiter(s) IV Continuous <Continuous>  dextrose 5%. 1000 milliLiter(s) IV Continuous <Continuous>  dextrose 50% Injectable 25 Gram(s) IV Push once  dextrose 50% Injectable 12.5 Gram(s) IV Push once  dextrose 50% Injectable 25 Gram(s) IV Push once  dextrose Oral Gel 15 Gram(s) Oral once PRN  glucagon  Injectable 1 milliGRAM(s) IntraMuscular once  heparin   Injectable 5000 Unit(s) SubCutaneous every 12 hours  insulin glargine Injectable (LANTUS) 8 Unit(s) SubCutaneous every morning  insulin lispro (ADMELOG) corrective regimen sliding scale   SubCutaneous three times a day before meals  LORazepam     Tablet 1 milliGRAM(s) Oral every 4 hours  LORazepam   Injectable 2 milliGRAM(s) IV Push every 6 hours PRN  midodrine 15 milliGRAM(s) Oral every 8 hours  norepinephrine Infusion 0.05 MICROgram(s)/kG/Min IV Continuous <Continuous>  pantoprazole  Injectable 40 milliGRAM(s) IV Push daily  polyethylene glycol 3350 17 Gram(s) Oral every 12 hours  povidone iodine 10% Solution 1 Application(s) Topical daily  senna 2 Tablet(s) Oral at bedtime  sodium chloride 0.9% lock flush 10 milliLiter(s) IV Push every 1 hour PRN  trimethoprim / sulfamethoxazole IVPB 285 milliGRAM(s) IV Intermittent every 8 hours      Review of Systems:  unable to obtain  Allergies: codeine (Hives)    For details regarding the patient's past medical history, social history, family history, and other miscellaneous elements, please refer the initial infectious diseases consultation and/or the admitting history and physical examination for this admission.    Objective:  Vitals:   T(C): 36.7 (11-22-22 @ 21:17), Max: 36.9 (11-22-22 @ 15:39)  HR: 51 (11-23-22 @ 06:46) (51 - 71)  BP: 87/51 (11-23-22 @ 04:00) (85/64 - 120/70)  RR: 22 (11-23-22 @ 04:00) (17 - 29)  SpO2: 100% (11-23-22 @ 06:46) (86% - 100%)    Physical Examination:  General: vented pt, nonverbal, vegetative state  HEENT: NC/AT  Neck: trach to vent  Lungs: MV breath sounds  Heart: Regular rate and rhythm.  Abdomen: Soft, distended, PGT in place.  Extremities: b/l 1st toes w/ appearance of dry gangrene  Skin: Warm.       Laboratory Studies:  CBC:                       7.8    11.71 )-----------( 191      ( 22 Nov 2022 06:00 )             25.9     CMP: 11-22    137  |  102  |  25<H>  ----------------------------<  173<H>  3.7   |  30  |  0.99    Ca    7.7<L>      22 Nov 2022 06:00  Phos  2.9     11-22    TPro  6.4  /  Alb  1.8<L>  /  TBili  0.4  /  DBili  x   /  AST  25  /  ALT  12  /  AlkPhos  107  11-22    LIVER FUNCTIONS - ( 22 Nov 2022 06:00 )  Alb: 1.8 g/dL / Pro: 6.4 g/dL / ALK PHOS: 107 U/L / ALT: 12 U/L DA / AST: 25 U/L / GGT: x               Microbiology: reviewed    Culture - Sputum (collected 11-21-22 @ 20:14)  Source: Trach Asp Tracheal Aspirate  Gram Stain (11-22-22 @ 08:43):    Moderate polymorphonuclear leukocytes per low power field    No Squamous epithelial cells per low power field    No organisms seen  Preliminary Report (11-22-22 @ 22:52):    Moderate Acinetobacter baumannii/nosocomialis group    Normal Respiratory Elvira present    Culture - Urine (collected 11-21-22 @ 04:25)  Source: Clean Catch Clean Catch (Midstream)  Final Report (11-22-22 @ 09:39):    <10,000 CFU/mL Normal Urogenital Elvira    Culture - Blood (collected 11-21-22 @ 03:56)  Source: .Blood Blood-Peripheral  Preliminary Report (11-22-22 @ 14:01):    No growth to date.    Culture - Blood (collected 11-21-22 @ 03:56)  Source: .Blood Blood-Peripheral  Preliminary Report (11-22-22 @ 14:01):    No growth to date.    Culture - Sputum (collected 11-13-22 @ 01:44)  Source: Trach Asp Tracheal Aspirate  Gram Stain (11-13-22 @ 21:20):    No polymorphonuclear leukocytes per low power field    No Squamous epithelial cells per low power field    No organisms seen per oil power field  Final Report (11-16-22 @ 12:08):    Rare Stenotrophomonas maltophilia    Normal Respiratory Elvira present  Organism: Stenotrophomonas maltophilia (11-16-22 @ 12:08)  Organism: Stenotrophomonas maltophilia (11-16-22 @ 12:08)      -  Minocycline: S      Method Type: KB  Organism: Stenotrophomonas maltophilia (11-16-22 @ 12:08)      -  Levofloxacin: I 4      -  Trimethoprim/Sulfamethoxazole: S 2/38      Method Type: PRATIK    Culture - Urine (collected 11-12-22 @ 06:53)  Source: Clean Catch Clean Catch (Midstream)  Final Report (11-14-22 @ 00:07):    <10,000 CFU/mL Normal Urogenital Elvira    Culture - Blood (collected 11-12-22 @ 06:53)  Source: .Blood Blood-Peripheral  Final Report (11-17-22 @ 13:00):    No Growth Final    Culture - Blood (collected 11-12-22 @ 06:53)  Source: .Blood Blood-Peripheral  Final Report (11-17-22 @ 13:00):    No Growth Final          Radiology: reviewed

## 2022-11-23 NOTE — PROGRESS NOTE ADULT - SUBJECTIVE AND OBJECTIVE BOX
Chief Complaint: Respiratory distress    Interval Events: No events overnight.    Review of Systems:  Unable to obtain    Physical Exam:  Vital Signs Last 24 Hrs  T(C): 36.7 (22 Nov 2022 21:17), Max: 36.9 (22 Nov 2022 15:39)  T(F): 98 (22 Nov 2022 21:17), Max: 98.4 (22 Nov 2022 15:39)  HR: 64 (23 Nov 2022 09:30) (51 - 71)  BP: 130/61 (23 Nov 2022 09:30) (85/64 - 130/61)  BP(mean): 83 (23 Nov 2022 09:30) (59 - 91)  RR: 25 (23 Nov 2022 09:30) (15 - 29)  SpO2: 96% (23 Nov 2022 09:30) (86% - 100%)  Parameters below as of 23 Nov 2022 08:00  Patient On (Oxygen Delivery Method): ventilator  O2 Concentration (%): 50  General: Unresponsive  HEENT: MMM  Neck: No JVD, no carotid bruit  Lungs: CTAB  CV: RRR, nl S1/S2, no M/R/G  Abdomen: S/NT/ND, +BS  Extremities: 1+ LE edema, no cyanosis  Neuro: AAOx0  Skin: No rash    Labs:    11-22    137  |  102  |  25<H>  ----------------------------<  173<H>  3.7   |  30  |  0.99    Ca    7.7<L>      22 Nov 2022 06:00  Phos  2.9     11-22    TPro  6.4  /  Alb  1.8<L>  /  TBili  0.4  /  DBili  x   /  AST  25  /  ALT  12  /  AlkPhos  107  11-22                        7.8    11.71 )-----------( 191      ( 22 Nov 2022 06:00 )             25.9       ECG/Telemetry: Sinus rhythm

## 2022-11-23 NOTE — PROGRESS NOTE ADULT - SUBJECTIVE AND OBJECTIVE BOX
Amandeep Villeda M.D.    Patient is a 77y old  Female who presents with a chief complaint of diaphoresis and fever (23 Nov 2022 11:25)      SUBJECTIVE / OVERNIGHT EVENTS: Remains on stable dose of pressors. ROS unable to be obtained.     MEDICATIONS  (STANDING):  albuterol    90 MICROgram(s) HFA Inhaler 2 Puff(s) Inhalation two times a day  chlorhexidine 0.12% Liquid 15 milliLiter(s) Oral Mucosa every 12 hours  chlorhexidine 2% Cloths 1 Application(s) Topical daily  dextrose 5%. 1000 milliLiter(s) (50 mL/Hr) IV Continuous <Continuous>  dextrose 5%. 1000 milliLiter(s) (100 mL/Hr) IV Continuous <Continuous>  dextrose 50% Injectable 25 Gram(s) IV Push once  dextrose 50% Injectable 12.5 Gram(s) IV Push once  dextrose 50% Injectable 25 Gram(s) IV Push once  glucagon  Injectable 1 milliGRAM(s) IntraMuscular once  heparin   Injectable 5000 Unit(s) SubCutaneous every 12 hours  insulin glargine Injectable (LANTUS) 8 Unit(s) SubCutaneous every morning  insulin lispro (ADMELOG) corrective regimen sliding scale   SubCutaneous every 6 hours  LORazepam     Tablet 1 milliGRAM(s) Oral every 4 hours  midodrine 15 milliGRAM(s) Oral every 8 hours  norepinephrine Infusion 0.05 MICROgram(s)/kG/Min (5.35 mL/Hr) IV Continuous <Continuous>  pantoprazole  Injectable 40 milliGRAM(s) IV Push daily  polyethylene glycol 3350 17 Gram(s) Oral every 12 hours  povidone iodine 10% Solution 1 Application(s) Topical daily  senna 2 Tablet(s) Oral at bedtime  trimethoprim / sulfamethoxazole IVPB 285 milliGRAM(s) IV Intermittent every 8 hours    MEDICATIONS  (PRN):  dextrose Oral Gel 15 Gram(s) Oral once PRN Blood Glucose LESS THAN 70 milliGRAM(s)/deciliter  LORazepam   Injectable 2 milliGRAM(s) IV Push every 6 hours PRN Agitation  sodium chloride 0.9% lock flush 10 milliLiter(s) IV Push every 1 hour PRN Pre/post blood products, medications, blood draw, and to maintain line patency      I&O's Summary    22 Nov 2022 07:01  -  23 Nov 2022 07:00  --------------------------------------------------------  IN: 79.8 mL / OUT: 450 mL / NET: -370.2 mL        PHYSICAL EXAM:  Vital Signs Last 24 Hrs  T(C): 36.7 (22 Nov 2022 21:17), Max: 36.9 (22 Nov 2022 15:39)  T(F): 98 (22 Nov 2022 21:17), Max: 98.4 (22 Nov 2022 15:39)  HR: 64 (23 Nov 2022 11:45) (51 - 68)  BP: 102/54 (23 Nov 2022 11:45) (85/64 - 130/61)  BP(mean): 69 (23 Nov 2022 11:45) (61 - 91)  RR: 13 (23 Nov 2022 11:45) (13 - 30)  SpO2: 96% (23 Nov 2022 11:45) (86% - 100%)    Parameters below as of 23 Nov 2022 08:00  Patient On (Oxygen Delivery Method): ventilator    O2 Concentration (%): 50    CONSTITUTIONAL: Trach, NAD  RESPIRATORY: Course breath sound b/l  CARDIOVASCULAR: Regular rate and rhythm, anasarca, depending edema  ABDOMEN: +minimal BS, distended (Per RN, at baseline), +PEG  PSYCH: unable to assess  NEUROLOGY: unable to assess    LABS:                        7.8    11.71 )-----------( 191      ( 22 Nov 2022 06:00 )             25.9     11-22    137  |  102  |  25<H>  ----------------------------<  173<H>  3.7   |  30  |  0.99    Ca    7.7<L>      22 Nov 2022 06:00  Phos  2.9     11-22    TPro  6.4  /  Alb  1.8<L>  /  TBili  0.4  /  DBili  x   /  AST  25  /  ALT  12  /  AlkPhos  107  11-22    PT/INR - ( 22 Nov 2022 06:00 )   PT: 16.0 sec;   INR: 1.35 ratio                   Culture - Sputum (collected 21 Nov 2022 20:14)  Source: Trach Asp Tracheal Aspirate  Gram Stain (22 Nov 2022 08:43):    Moderate polymorphonuclear leukocytes per low power field    No Squamous epithelial cells per low power field    No organisms seen  Preliminary Report (22 Nov 2022 22:52):    Moderate Acinetobacter baumannii/nosocomialis group    Normal Respiratory Elvira present    Culture - Urine (collected 21 Nov 2022 04:25)  Source: Clean Catch Clean Catch (Midstream)  Final Report (22 Nov 2022 09:39):    <10,000 CFU/mL Normal Urogenital Elvira    Culture - Blood (collected 21 Nov 2022 03:56)  Source: .Blood Blood-Peripheral  Preliminary Report (22 Nov 2022 14:01):    No growth to date.    Culture - Blood (collected 21 Nov 2022 03:56)  Source: .Blood Blood-Peripheral  Preliminary Report (22 Nov 2022 14:01):    No growth to date.      CAPILLARY BLOOD GLUCOSE      POCT Blood Glucose.: 133 mg/dL (23 Nov 2022 05:48)  POCT Blood Glucose.: 141 mg/dL (22 Nov 2022 21:31)  POCT Blood Glucose.: 135 mg/dL (22 Nov 2022 16:26)      RADIOLOGY & ADDITIONAL TESTS:  Results Reviewed:   Imaging Personally Reviewed:  Electrocardiogram Personally Reviewed:

## 2022-11-23 NOTE — PROGRESS NOTE ADULT - SUBJECTIVE AND OBJECTIVE BOX
Patient is a 77y old  Female who presents with a chief complaint of diaphoresis and fever (23 Nov 2022 12:02)    PAST MEDICAL & SURGICAL HISTORY:  Dementia of frontal lobe type      Aphasic stroke      Diabetes mellitus      Respiratory failure      Hypertension      GERD (gastroesophageal reflux disease)      Constipation      Respiratory failure      CVA (cerebral vascular accident)      HTN (hypertension)      DM (diabetes mellitus)      Advanced dementia      COVID-19 virus detected      Quadriplegia      Pneumonia      Type II diabetes mellitus      Hx of appendectomy      Gastrostomy in place      Tracheostomy in place      Tracheostomy tube present      Feeding by G-tube        BREA BECKHAM   77y    Female    BRIEF HOSPITAL COURSE:    Review of Systems:                       All other ROS are negative.    Allergies    codeine (Hives)    Intolerances          ICU Vital Signs Last 24 Hrs  T(C): 36.4 (23 Nov 2022 12:47), Max: 36.7 (22 Nov 2022 21:17)  T(F): 97.6 (23 Nov 2022 12:47), Max: 98 (22 Nov 2022 21:17)  HR: 60 (23 Nov 2022 18:30) (50 - 67)  BP: 136/97 (23 Nov 2022 18:30) (85/57 - 136/97)  BP(mean): 110 (23 Nov 2022 18:30) (57 - 110)  ABP: --  ABP(mean): --  RR: 25 (23 Nov 2022 18:30) (13 - 30)  SpO2: 86% (23 Nov 2022 18:30) (86% - 100%)    O2 Parameters below as of 23 Nov 2022 08:00  Patient On (Oxygen Delivery Method): ventilator    O2 Concentration (%): 50      Physical Examination:    General:     HEENT:     PULM:     CVS:     ABD:     EXT:     SKIN:     Neuro:      Mode: AC/ CMV (Assist Control/ Continuous Mandatory Ventilation)  RR (machine): 18  TV (machine): 350  FiO2: 50  PEEP: 5  ITime: 1  MAP: 16  PIP: 30    Mode: AC/ CMV (Assist Control/ Continuous Mandatory Ventilation), RR (machine): 18, TV (machine): 350, FiO2: 50, PEEP: 5, ITime: 1, MAP: 16, PIP: 30  LABS:                        7.7    8.82  )-----------( 181      ( 23 Nov 2022 16:08 )             25.3     11-22    137  |  102  |  25<H>  ----------------------------<  173<H>  3.7   |  30  |  0.99    Ca    7.7<L>      22 Nov 2022 06:00  Phos  2.9     11-22    TPro  6.4  /  Alb  1.8<L>  /  TBili  0.4  /  DBili  x   /  AST  25  /  ALT  12  /  AlkPhos  107  11-22          CAPILLARY BLOOD GLUCOSE      POCT Blood Glucose.: 132 mg/dL (23 Nov 2022 17:53)  POCT Blood Glucose.: 140 mg/dL (23 Nov 2022 13:32)  POCT Blood Glucose.: 133 mg/dL (23 Nov 2022 05:48)  POCT Blood Glucose.: 141 mg/dL (22 Nov 2022 21:31)    PT/INR - ( 22 Nov 2022 06:00 )   PT: 16.0 sec;   INR: 1.35 ratio             CULTURES:  Culture Results:   Moderate Acinetobacter baumannii/nosocomialis group  Normal Respiratory Elvira present (11-21 @ 20:14)  Rapid RVP Result: NotDetec (11-21 @ 05:02)  Culture Results:   <10,000 CFU/mL Normal Urogenital Elvira (11-21 @ 04:25)  Culture Results:   No growth to date. (11-21 @ 03:56)  Culture Results:   No growth to date. (11-21 @ 03:56)      Medications:  MEDICATIONS  (STANDING):  albuterol    90 MICROgram(s) HFA Inhaler 2 Puff(s) Inhalation two times a day  chlorhexidine 0.12% Liquid 15 milliLiter(s) Oral Mucosa every 12 hours  chlorhexidine 2% Cloths 1 Application(s) Topical daily  dextrose 5%. 1000 milliLiter(s) (50 mL/Hr) IV Continuous <Continuous>  dextrose 5%. 1000 milliLiter(s) (100 mL/Hr) IV Continuous <Continuous>  dextrose 50% Injectable 25 Gram(s) IV Push once  dextrose 50% Injectable 12.5 Gram(s) IV Push once  dextrose 50% Injectable 25 Gram(s) IV Push once  glucagon  Injectable 1 milliGRAM(s) IntraMuscular once  heparin   Injectable 5000 Unit(s) SubCutaneous every 12 hours  insulin glargine Injectable (LANTUS) 8 Unit(s) SubCutaneous every morning  insulin lispro (ADMELOG) corrective regimen sliding scale   SubCutaneous every 6 hours  LORazepam     Tablet 1 milliGRAM(s) Oral every 4 hours  midodrine 15 milliGRAM(s) Oral every 8 hours  norepinephrine Infusion 0.05 MICROgram(s)/kG/Min (5.35 mL/Hr) IV Continuous <Continuous>  pantoprazole  Injectable 40 milliGRAM(s) IV Push daily  polyethylene glycol 3350 17 Gram(s) Oral every 12 hours  povidone iodine 10% Solution 1 Application(s) Topical daily  senna 2 Tablet(s) Oral at bedtime  trimethoprim / sulfamethoxazole IVPB 285 milliGRAM(s) IV Intermittent every 8 hours    MEDICATIONS  (PRN):  dextrose Oral Gel 15 Gram(s) Oral once PRN Blood Glucose LESS THAN 70 milliGRAM(s)/deciliter  LORazepam   Injectable 2 milliGRAM(s) IV Push every 6 hours PRN Agitation  sodium chloride 0.9% lock flush 10 milliLiter(s) IV Push every 1 hour PRN Pre/post blood products, medications, blood draw, and to maintain line patency        11-22 @ 07:01 - 11-23 @ 07:00  --------------------------------------------------------  IN: 79.8 mL / OUT: 450 mL / NET: -370.2 mL    11-23 @ 07:01 - 11-23 @ 19:55  --------------------------------------------------------  IN: 1140 mL / OUT: 0 mL / NET: 1140 mL        RADIOLOGY/IMAGING/ECHO    Critical care point of care ultrasound:    Assessment/Plan:          CRITICAL CARE TIME SPENT: 37 minutes assessing presenting problems of acute illness, which pose high probability of life threatening deterioration or end organ damage/dysfunction, as well as medical decision making including initiating plan of care, reviewing data, reviewing radiologic exams, discussing with multidisciplinary team,  discussing goals of care with patient/family, and writing this note.  Non-inclusive of procedures performed,         Patient is a 77y old  Female who presents with a chief complaint of diaphoresis and fever (23 Nov 2022 12:02)    PAST MEDICAL & SURGICAL HISTORY:  Dementia of frontal lobe type      Aphasic stroke      Diabetes mellitus      Respiratory failure      Hypertension      GERD (gastroesophageal reflux disease)      Constipation      Respiratory failure      CVA (cerebral vascular accident)      HTN (hypertension)      DM (diabetes mellitus)      Advanced dementia      COVID-19 virus detected      Quadriplegia      Pneumonia      Type II diabetes mellitus      Hx of appendectomy      Gastrostomy in place      Tracheostomy in place      Tracheostomy tube present      Feeding by G-tube        BREA BEKCHAM   77y    Female    BRIEF HOSPITAL COURSE: No new events    Review of Systems:                       All other ROS are negative.    Allergies    codeine (Hives)    Intolerances          ICU Vital Signs Last 24 Hrs  T(C): 36.4 (23 Nov 2022 12:47), Max: 36.7 (22 Nov 2022 21:17)  T(F): 97.6 (23 Nov 2022 12:47), Max: 98 (22 Nov 2022 21:17)  HR: 60 (23 Nov 2022 18:30) (50 - 67)  BP: 136/97 (23 Nov 2022 18:30) (85/57 - 136/97)  BP(mean): 110 (23 Nov 2022 18:30) (57 - 110)  ABP: --  ABP(mean): --  RR: 25 (23 Nov 2022 18:30) (13 - 30)  SpO2: 86% (23 Nov 2022 18:30) (86% - 100%)    O2 Parameters below as of 23 Nov 2022 08:00  Patient On (Oxygen Delivery Method): ventilator    O2 Concentration (%): 50      Physical Examination:    General: Intermittent anxiety and resp distress    PULM: b/l rhonci    CVS: s1s2    ABD: soft nt nd    EXT: compartments soft    SKIN: intact    Neuro: at baseline      Mode: AC/ CMV (Assist Control/ Continuous Mandatory Ventilation)  RR (machine): 18  TV (machine): 350  FiO2: 50  PEEP: 5  ITime: 1  MAP: 16  PIP: 30    Mode: AC/ CMV (Assist Control/ Continuous Mandatory Ventilation), RR (machine): 18, TV (machine): 350, FiO2: 50, PEEP: 5, ITime: 1, MAP: 16, PIP: 30  LABS:                        7.7    8.82  )-----------( 181      ( 23 Nov 2022 16:08 )             25.3     11-22    137  |  102  |  25<H>  ----------------------------<  173<H>  3.7   |  30  |  0.99    Ca    7.7<L>      22 Nov 2022 06:00  Phos  2.9     11-22    TPro  6.4  /  Alb  1.8<L>  /  TBili  0.4  /  DBili  x   /  AST  25  /  ALT  12  /  AlkPhos  107  11-22          CAPILLARY BLOOD GLUCOSE      POCT Blood Glucose.: 132 mg/dL (23 Nov 2022 17:53)  POCT Blood Glucose.: 140 mg/dL (23 Nov 2022 13:32)  POCT Blood Glucose.: 133 mg/dL (23 Nov 2022 05:48)  POCT Blood Glucose.: 141 mg/dL (22 Nov 2022 21:31)    PT/INR - ( 22 Nov 2022 06:00 )   PT: 16.0 sec;   INR: 1.35 ratio             CULTURES:  Culture Results:   Moderate Acinetobacter baumannii/nosocomialis group  Normal Respiratory Elvira present (11-21 @ 20:14)  Rapid RVP Result: NotDetec (11-21 @ 05:02)  Culture Results:   <10,000 CFU/mL Normal Urogenital Elvira (11-21 @ 04:25)  Culture Results:   No growth to date. (11-21 @ 03:56)  Culture Results:   No growth to date. (11-21 @ 03:56)      Medications:  MEDICATIONS  (STANDING):  albuterol    90 MICROgram(s) HFA Inhaler 2 Puff(s) Inhalation two times a day  chlorhexidine 0.12% Liquid 15 milliLiter(s) Oral Mucosa every 12 hours  chlorhexidine 2% Cloths 1 Application(s) Topical daily  dextrose 5%. 1000 milliLiter(s) (50 mL/Hr) IV Continuous <Continuous>  dextrose 5%. 1000 milliLiter(s) (100 mL/Hr) IV Continuous <Continuous>  dextrose 50% Injectable 25 Gram(s) IV Push once  dextrose 50% Injectable 12.5 Gram(s) IV Push once  dextrose 50% Injectable 25 Gram(s) IV Push once  glucagon  Injectable 1 milliGRAM(s) IntraMuscular once  heparin   Injectable 5000 Unit(s) SubCutaneous every 12 hours  insulin glargine Injectable (LANTUS) 8 Unit(s) SubCutaneous every morning  insulin lispro (ADMELOG) corrective regimen sliding scale   SubCutaneous every 6 hours  LORazepam     Tablet 1 milliGRAM(s) Oral every 4 hours  midodrine 15 milliGRAM(s) Oral every 8 hours  norepinephrine Infusion 0.05 MICROgram(s)/kG/Min (5.35 mL/Hr) IV Continuous <Continuous>  pantoprazole  Injectable 40 milliGRAM(s) IV Push daily  polyethylene glycol 3350 17 Gram(s) Oral every 12 hours  povidone iodine 10% Solution 1 Application(s) Topical daily  senna 2 Tablet(s) Oral at bedtime  trimethoprim / sulfamethoxazole IVPB 285 milliGRAM(s) IV Intermittent every 8 hours    MEDICATIONS  (PRN):  dextrose Oral Gel 15 Gram(s) Oral once PRN Blood Glucose LESS THAN 70 milliGRAM(s)/deciliter  LORazepam   Injectable 2 milliGRAM(s) IV Push every 6 hours PRN Agitation  sodium chloride 0.9% lock flush 10 milliLiter(s) IV Push every 1 hour PRN Pre/post blood products, medications, blood draw, and to maintain line patency        11-22 @ 07:01 - 11-23 @ 07:00  --------------------------------------------------------  IN: 79.8 mL / OUT: 450 mL / NET: -370.2 mL    11-23 @ 07:01 - 11-23 @ 19:55  --------------------------------------------------------  IN: 1140 mL / OUT: 0 mL / NET: 1140 mL        RADIOLOGY/IMAGING/ECHO    Critical care point of care ultrasound:    Assessment/Plan:    Still needs intermittent ativan and additional prns for these anxeity? attacks  Cont midodrine  Cont pulm toilet and nebs  Norepi as needed  PPI  DVT ppx  Insulin sliding scale

## 2022-11-24 LAB
-  MINOCYCLINE: SIGNIFICANT CHANGE UP
-  POLYMYXIN B: SIGNIFICANT CHANGE UP
ANION GAP SERPL CALC-SCNC: 5 MMOL/L — SIGNIFICANT CHANGE UP (ref 5–17)
BUN SERPL-MCNC: 28 MG/DL — HIGH (ref 7–23)
CALCIUM SERPL-MCNC: 7.5 MG/DL — LOW (ref 8.4–10.5)
CHLORIDE SERPL-SCNC: 97 MMOL/L — SIGNIFICANT CHANGE UP (ref 96–108)
CO2 SERPL-SCNC: 28 MMOL/L — SIGNIFICANT CHANGE UP (ref 22–31)
CREAT SERPL-MCNC: 1.13 MG/DL — SIGNIFICANT CHANGE UP (ref 0.5–1.3)
CULTURE RESULTS: SIGNIFICANT CHANGE UP
EGFR: 49 ML/MIN/1.73M2 — LOW
GLUCOSE SERPL-MCNC: 115 MG/DL — HIGH (ref 70–99)
HCT VFR BLD CALC: 25.3 % — LOW (ref 34.5–45)
HGB BLD-MCNC: 7.7 G/DL — LOW (ref 11.5–15.5)
MAGNESIUM SERPL-MCNC: 2.3 MG/DL — SIGNIFICANT CHANGE UP (ref 1.6–2.6)
MCHC RBC-ENTMCNC: 27 PG — SIGNIFICANT CHANGE UP (ref 27–34)
MCHC RBC-ENTMCNC: 30.4 GM/DL — LOW (ref 32–36)
MCV RBC AUTO: 88.8 FL — SIGNIFICANT CHANGE UP (ref 80–100)
METHOD TYPE: SIGNIFICANT CHANGE UP
NRBC # BLD: 0 /100 WBCS — SIGNIFICANT CHANGE UP (ref 0–0)
ORGANISM # SPEC MICROSCOPIC CNT: SIGNIFICANT CHANGE UP
OSMOLALITY UR: 290 MOSM/KG — SIGNIFICANT CHANGE UP (ref 50–1200)
PHOSPHATE SERPL-MCNC: 3 MG/DL — SIGNIFICANT CHANGE UP (ref 2.5–4.5)
PLATELET # BLD AUTO: 199 K/UL — SIGNIFICANT CHANGE UP (ref 150–400)
POTASSIUM SERPL-MCNC: 4.8 MMOL/L — SIGNIFICANT CHANGE UP (ref 3.5–5.3)
POTASSIUM SERPL-SCNC: 4.8 MMOL/L — SIGNIFICANT CHANGE UP (ref 3.5–5.3)
RBC # BLD: 2.85 M/UL — LOW (ref 3.8–5.2)
RBC # FLD: 17.7 % — HIGH (ref 10.3–14.5)
SODIUM SERPL-SCNC: 130 MMOL/L — LOW (ref 135–145)
SODIUM UR-SCNC: 30 MMOL/L — SIGNIFICANT CHANGE UP
SPECIMEN SOURCE: SIGNIFICANT CHANGE UP
WBC # BLD: 12.15 K/UL — HIGH (ref 3.8–10.5)
WBC # FLD AUTO: 12.15 K/UL — HIGH (ref 3.8–10.5)

## 2022-11-24 PROCEDURE — 99233 SBSQ HOSP IP/OBS HIGH 50: CPT

## 2022-11-24 RX ORDER — CHLORHEXIDINE GLUCONATE 213 G/1000ML
15 SOLUTION TOPICAL EVERY 12 HOURS
Refills: 0 | Status: DISCONTINUED | OUTPATIENT
Start: 2022-11-24 | End: 2022-11-24

## 2022-11-24 RX ORDER — SODIUM CHLORIDE 9 MG/ML
1000 INJECTION INTRAMUSCULAR; INTRAVENOUS; SUBCUTANEOUS
Refills: 0 | Status: DISCONTINUED | OUTPATIENT
Start: 2022-11-24 | End: 2022-12-02

## 2022-11-24 RX ADMIN — Medication 517.81 MILLIGRAM(S): at 07:07

## 2022-11-24 RX ADMIN — POLYETHYLENE GLYCOL 3350 17 GRAM(S): 17 POWDER, FOR SOLUTION ORAL at 05:19

## 2022-11-24 RX ADMIN — Medication 2 MILLIGRAM(S): at 07:14

## 2022-11-24 RX ADMIN — Medication 2: at 11:38

## 2022-11-24 RX ADMIN — Medication 1 MILLIGRAM(S): at 05:19

## 2022-11-24 RX ADMIN — Medication 1 MILLIGRAM(S): at 11:48

## 2022-11-24 RX ADMIN — Medication 517.81 MILLIGRAM(S): at 13:23

## 2022-11-24 RX ADMIN — Medication 1 MILLIGRAM(S): at 17:09

## 2022-11-24 RX ADMIN — Medication 2 MILLIGRAM(S): at 06:14

## 2022-11-24 RX ADMIN — Medication 2 MILLIGRAM(S): at 00:04

## 2022-11-24 RX ADMIN — Medication 5.35 MICROGRAM(S)/KG/MIN: at 11:53

## 2022-11-24 RX ADMIN — HEPARIN SODIUM 5000 UNIT(S): 5000 INJECTION INTRAVENOUS; SUBCUTANEOUS at 17:08

## 2022-11-24 RX ADMIN — ALBUTEROL 2 PUFF(S): 90 AEROSOL, METERED ORAL at 19:33

## 2022-11-24 RX ADMIN — Medication 1 MILLIGRAM(S): at 21:27

## 2022-11-24 RX ADMIN — POLYETHYLENE GLYCOL 3350 17 GRAM(S): 17 POWDER, FOR SOLUTION ORAL at 17:18

## 2022-11-24 RX ADMIN — Medication 1 MILLIGRAM(S): at 02:03

## 2022-11-24 RX ADMIN — Medication 517.81 MILLIGRAM(S): at 22:16

## 2022-11-24 RX ADMIN — MIDODRINE HYDROCHLORIDE 15 MILLIGRAM(S): 2.5 TABLET ORAL at 05:20

## 2022-11-24 RX ADMIN — Medication 2: at 17:36

## 2022-11-24 RX ADMIN — MIDODRINE HYDROCHLORIDE 15 MILLIGRAM(S): 2.5 TABLET ORAL at 21:28

## 2022-11-24 RX ADMIN — INSULIN GLARGINE 8 UNIT(S): 100 INJECTION, SOLUTION SUBCUTANEOUS at 08:10

## 2022-11-24 RX ADMIN — Medication 1 APPLICATION(S): at 11:54

## 2022-11-24 RX ADMIN — Medication 1 MILLIGRAM(S): at 13:23

## 2022-11-24 RX ADMIN — CHLORHEXIDINE GLUCONATE 15 MILLILITER(S): 213 SOLUTION TOPICAL at 17:08

## 2022-11-24 RX ADMIN — SENNA PLUS 2 TABLET(S): 8.6 TABLET ORAL at 21:27

## 2022-11-24 RX ADMIN — HEPARIN SODIUM 5000 UNIT(S): 5000 INJECTION INTRAVENOUS; SUBCUTANEOUS at 05:19

## 2022-11-24 RX ADMIN — MIDODRINE HYDROCHLORIDE 15 MILLIGRAM(S): 2.5 TABLET ORAL at 13:23

## 2022-11-24 RX ADMIN — ALBUTEROL 2 PUFF(S): 90 AEROSOL, METERED ORAL at 08:29

## 2022-11-24 RX ADMIN — PANTOPRAZOLE SODIUM 40 MILLIGRAM(S): 20 TABLET, DELAYED RELEASE ORAL at 11:29

## 2022-11-24 RX ADMIN — CHLORHEXIDINE GLUCONATE 1 APPLICATION(S): 213 SOLUTION TOPICAL at 11:30

## 2022-11-24 RX ADMIN — CHLORHEXIDINE GLUCONATE 15 MILLILITER(S): 213 SOLUTION TOPICAL at 05:19

## 2022-11-24 RX ADMIN — Medication 2: at 00:06

## 2022-11-24 RX ADMIN — Medication 4 MILLIGRAM(S): at 08:10

## 2022-11-24 RX ADMIN — SODIUM CHLORIDE 100 MILLILITER(S): 9 INJECTION INTRAMUSCULAR; INTRAVENOUS; SUBCUTANEOUS at 11:29

## 2022-11-24 NOTE — PROGRESS NOTE ADULT - SUBJECTIVE AND OBJECTIVE BOX
PROGRESS NOTE:   Authoted by Dr. Mesha Clark MD, Available on MS Teams    Patient is a 77y old  Female who presents with a chief complaint of diaphoresis and fever (24 Nov 2022 09:25)      SUBJECTIVE / OVERNIGHT EVENTS: Patient diaphoretic this morning as well as tachypneic. received 2mg IV ativan X1 and then another 4mg IV X1. Unable to obtain ROS due to mental status    ADDITIONAL REVIEW OF SYSTEMS:    MEDICATIONS  (STANDING):  albuterol    90 MICROgram(s) HFA Inhaler 2 Puff(s) Inhalation two times a day  chlorhexidine 0.12% Liquid 15 milliLiter(s) Oral Mucosa every 12 hours  chlorhexidine 2% Cloths 1 Application(s) Topical daily  dextrose 5%. 1000 milliLiter(s) (50 mL/Hr) IV Continuous <Continuous>  dextrose 5%. 1000 milliLiter(s) (100 mL/Hr) IV Continuous <Continuous>  dextrose 50% Injectable 25 Gram(s) IV Push once  dextrose 50% Injectable 12.5 Gram(s) IV Push once  dextrose 50% Injectable 25 Gram(s) IV Push once  glucagon  Injectable 1 milliGRAM(s) IntraMuscular once  heparin   Injectable 5000 Unit(s) SubCutaneous every 12 hours  insulin glargine Injectable (LANTUS) 8 Unit(s) SubCutaneous every morning  insulin lispro (ADMELOG) corrective regimen sliding scale   SubCutaneous every 6 hours  LORazepam     Tablet 1 milliGRAM(s) Oral every 4 hours  midodrine 15 milliGRAM(s) Oral every 8 hours  norepinephrine Infusion 0.05 MICROgram(s)/kG/Min (5.35 mL/Hr) IV Continuous <Continuous>  pantoprazole  Injectable 40 milliGRAM(s) IV Push daily  polyethylene glycol 3350 17 Gram(s) Oral every 12 hours  povidone iodine 10% Solution 1 Application(s) Topical daily  senna 2 Tablet(s) Oral at bedtime  trimethoprim / sulfamethoxazole IVPB 285 milliGRAM(s) IV Intermittent every 8 hours    MEDICATIONS  (PRN):  dextrose Oral Gel 15 Gram(s) Oral once PRN Blood Glucose LESS THAN 70 milliGRAM(s)/deciliter  LORazepam   Injectable 2 milliGRAM(s) IV Push every 6 hours PRN Agitation  sodium chloride 0.9% lock flush 10 milliLiter(s) IV Push every 1 hour PRN Pre/post blood products, medications, blood draw, and to maintain line patency      CAPILLARY BLOOD GLUCOSE      POCT Blood Glucose.: 220 mg/dL (24 Nov 2022 08:15)  POCT Blood Glucose.: 121 mg/dL (24 Nov 2022 05:49)  POCT Blood Glucose.: 171 mg/dL (24 Nov 2022 00:00)  POCT Blood Glucose.: 132 mg/dL (23 Nov 2022 17:53)  POCT Blood Glucose.: 140 mg/dL (23 Nov 2022 13:32)    I&O's Summary    23 Nov 2022 07:01  -  24 Nov 2022 07:00  --------------------------------------------------------  IN: 1595.6 mL / OUT: 450 mL / NET: 1145.6 mL    24 Nov 2022 07:01  -  24 Nov 2022 11:09  --------------------------------------------------------  IN: 46.9 mL / OUT: 0 mL / NET: 46.9 mL        PHYSICAL EXAM:  Vital Signs Last 24 Hrs  T(C): 37.1 (24 Nov 2022 08:21), Max: 37.1 (24 Nov 2022 08:21)  T(F): 98.7 (24 Nov 2022 08:21), Max: 98.7 (24 Nov 2022 08:21)  HR: 55 (24 Nov 2022 10:45) (50 - 112)  BP: 106/49 (24 Nov 2022 10:45) (69/39 - 215/62)  BP(mean): 67 (24 Nov 2022 10:45) (51 - 149)  RR: 20 (24 Nov 2022 10:45) (7 - 41)  SpO2: 96% (24 Nov 2022 10:45) (83% - 100%)    Parameters below as of 24 Nov 2022 10:30  Patient On (Oxygen Delivery Method): ventilator    O2 Concentration (%): 60    CONSTITUTIONAL: Trach, NAD  HEENT: pupils dilated b/l, R gaze preference   RESPIRATORY: Course breath sound b/l  CARDIOVASCULAR: Regular rate and rhythm, anasarca, dependent edema  ABDOMEN: +minimal BS, distended (Per RN, at baseline), +PEG  PSYCH: unable to assess  NEUROLOGY: unable to assess    LABS:                        7.7    12.15 )-----------( 199      ( 24 Nov 2022 05:30 )             25.3     11-24    130<L>  |  97  |  28<H>  ----------------------------<  115<H>  4.8   |  28  |  1.13    Ca    7.5<L>      24 Nov 2022 05:30  Phos  3.0     11-24  Mg     2.3     11-24      Culture - Sputum (collected 21 Nov 2022 20:14)  Source: Trach Asp Tracheal Aspirate  Gram Stain (22 Nov 2022 08:43):    Moderate polymorphonuclear leukocytes per low power field    No Squamous epithelial cells per low power field    No organisms seen  Preliminary Report (22 Nov 2022 22:52):    Moderate Acinetobacter baumannii/nosocomialis group    Normal Respiratory Elvira present  Organism: Acinetobacter baumannii/nosocom group (Carbapenem Resistant) (23 Nov 2022 19:20)  Organism: Acinetobacter baumannii/nosocom group (Carbapenem Resistant) (23 Nov 2022 19:20)  Organism: Acinetobacter baumannii/nosocom group (Carbapenem Resistant) (23 Nov 2022 19:20)

## 2022-11-24 NOTE — PROGRESS NOTE ADULT - SUBJECTIVE AND OBJECTIVE BOX
Chief Complaint: Respiratory distress    Interval Events: Diaphoretic and respiratory distress this morning.    Review of Systems:  Unable to obtain    Physical Exam:  Vital Signs Last 24 Hrs  T(C): 37.1 (24 Nov 2022 08:21), Max: 37.1 (24 Nov 2022 08:21)  T(F): 98.7 (24 Nov 2022 08:21), Max: 98.7 (24 Nov 2022 08:21)  HR: 61 (24 Nov 2022 09:00) (50 - 112)  BP: 86/48 (24 Nov 2022 09:06) (69/39 - 215/62)  BP(mean): 51 (24 Nov 2022 09:06) (51 - 149)  RR: 7 (24 Nov 2022 09:00) (7 - 41)  SpO2: 83% (24 Nov 2022 09:00) (83% - 100%)  Parameters below as of 24 Nov 2022 08:00  Patient On (Oxygen Delivery Method): ventilator  O2 Concentration (%): 100  General: Unresponsive  HEENT: MMM  Neck: No JVD, no carotid bruit  Lungs: CTAB  CV: RRR, nl S1/S2, no M/R/G  Abdomen: S/NT/ND, +BS  Extremities: 1+ LE edema, no cyanosis  Neuro: AAOx0  Skin: No rash    Labs:    11-24    130<L>  |  97  |  28<H>  ----------------------------<  115<H>  4.8   |  28  |  1.13    Ca    7.5<L>      24 Nov 2022 05:30  Phos  3.0     11-24  Mg     2.3     11-24                          7.7    12.15 )-----------( 199      ( 24 Nov 2022 05:30 )             25.3       ECG/Telemetry: Sinus rhythm

## 2022-11-24 NOTE — PROGRESS NOTE ADULT - SUBJECTIVE AND OBJECTIVE BOX
BREA BECKHAM     [facility]    Allergies    codeine (Hives)    Intolerances        PAST MEDICAL & SURGICAL HISTORY:  Dementia of frontal lobe type      Aphasic stroke      Diabetes mellitus      Respiratory failure      Hypertension      GERD (gastroesophageal reflux disease)      Constipation      Respiratory failure      CVA (cerebral vascular accident)      HTN (hypertension)      DM (diabetes mellitus)      Advanced dementia      COVID-19 virus detected      Quadriplegia      Pneumonia      Type II diabetes mellitus      Hx of appendectomy      Gastrostomy in place      Tracheostomy in place      Tracheostomy tube present      Feeding by G-tube          FAMILY HISTORY:  No pertinent family history in first degree relatives        Home Medications:  Admelog 100 units/mL injectable solution: injectable every 6 hours SS (21 Nov 2022 05:08)  albuterol 90 mcg/inh inhalation aerosol with adapter: 2  inhaled every 6 hours (21 Nov 2022 04:24)  Bacid (LAC) oral tablet: 2 tab(s) by gastrostomy tube once a day (21 Nov 2022 04:24)  bacitracin 500 units/g topical ointment: Apply topically to affected area once a day to knees (21 Nov 2022 04:24)  chlorhexidine 0.12% mucous membrane liquid: 15 milliliter(s) mucous membrane 2 times a day (21 Nov 2022 04:24)  Dakins Full Strength 0.5% topical solution: Apply topically to affected area 2 times a day then apply santyl and calcium alginate (21 Nov 2022 04:24)  Eucerin topical cream: Apply topically to affected area once a day bilateral feet (21 Nov 2022 04:24)  ferrous sulfate 325 mg (65 mg elemental iron) oral tablet: 1 tab(s) by gastrostomy tube once a day (21 Nov 2022 04:24)  insulin glargine 100 units/mL subcutaneous solution: 8 unit(s) subcutaneous once a day (in the morning) (21 Nov 2022 04:24)  ipratropium-albuterol 0.5 mg-2.5 mg/3 mL inhalation solution: 3 milliliter(s) inhaled every 6 hours (21 Nov 2022 04:24)  LORazepam 1 mg oral tablet: 1 tab(s) by gastrostomy tube every 4 hours (21 Nov 2022 04:24)  midodrine 10 mg oral tablet: 1 tab(s) by gastrostomy tube every 8 hours (21 Nov 2022 04:24)  MiraLax oral powder for reconstitution: orally once a day (at bedtime) (21 Nov 2022 05:08)  mulivitamin: by gastrostomy tube once a day (21 Nov 2022 04:24)  nystatin 100,000 units/g topical powder: 1 application topically 3 times a day (21 Nov 2022 04:24)  omeprazole 40 mg oral delayed release capsule: 1 cap(s) by gastrostomy tube 2 times a day (21 Nov 2022 04:24)  polyethylene glycol 3350 oral powder for reconstitution: 17 gram(s) by gastrostomy tube every 12 hours (21 Nov 2022 04:24)  senna 8.6 mg oral tablet: 3 tab(s) by gastrostomy tube once a day (at bedtime) (21 Nov 2022 04:24)  simethicone 80 mg oral tablet, chewable: 1 tab(s) by gastrostomy tube every 6 hours (21 Nov 2022 04:24)  sucralfate 1 g/10 mL oral suspension: 10 milliliter(s) g-tube 4 times a day (before meals and at bedtime) (21 Nov 2022 04:24)  Tylenol 325 mg oral tablet: 2 tab(s) orally every 6 hours, As Needed prior to dressing change (21 Nov 2022 04:24)      MEDICATIONS  (STANDING):  albuterol    90 MICROgram(s) HFA Inhaler 2 Puff(s) Inhalation two times a day  chlorhexidine 0.12% Liquid 15 milliLiter(s) Oral Mucosa every 12 hours  chlorhexidine 2% Cloths 1 Application(s) Topical daily  dextrose 5%. 1000 milliLiter(s) (50 mL/Hr) IV Continuous <Continuous>  dextrose 5%. 1000 milliLiter(s) (100 mL/Hr) IV Continuous <Continuous>  dextrose 50% Injectable 25 Gram(s) IV Push once  dextrose 50% Injectable 12.5 Gram(s) IV Push once  dextrose 50% Injectable 25 Gram(s) IV Push once  glucagon  Injectable 1 milliGRAM(s) IntraMuscular once  heparin   Injectable 5000 Unit(s) SubCutaneous every 12 hours  insulin glargine Injectable (LANTUS) 8 Unit(s) SubCutaneous every morning  insulin lispro (ADMELOG) corrective regimen sliding scale   SubCutaneous every 6 hours  LORazepam     Tablet 1 milliGRAM(s) Oral every 4 hours  midodrine 15 milliGRAM(s) Oral every 8 hours  norepinephrine Infusion 0.05 MICROgram(s)/kG/Min (5.35 mL/Hr) IV Continuous <Continuous>  pantoprazole  Injectable 40 milliGRAM(s) IV Push daily  polyethylene glycol 3350 17 Gram(s) Oral every 12 hours  povidone iodine 10% Solution 1 Application(s) Topical daily  senna 2 Tablet(s) Oral at bedtime  trimethoprim / sulfamethoxazole IVPB 285 milliGRAM(s) IV Intermittent every 8 hours    MEDICATIONS  (PRN):  dextrose Oral Gel 15 Gram(s) Oral once PRN Blood Glucose LESS THAN 70 milliGRAM(s)/deciliter  LORazepam   Injectable 2 milliGRAM(s) IV Push every 6 hours PRN Agitation  sodium chloride 0.9% lock flush 10 milliLiter(s) IV Push every 1 hour PRN Pre/post blood products, medications, blood draw, and to maintain line patency      Diet, NPO with Tube Feed:   Tube Feeding Modality: Gastrostomy  Glucerna 1.5 Horacio  Total Volume for 24 Hours (mL): 1000  Continuous  Starting Tube Feed Rate mL per Hour: 10  Increase Tube Feed Rate by (mL): 10     Every 10 hours  Until Goal Tube Feed Rate (mL per Hour): 50  Tube Feed Duration (in Hours): 20  Tube Feed Start Time: 17:00  Free Water Flush  Pump   Rate (mL per Hour): 25   Frequency: Every Hour    Duration (Hours): 24 (11-21-22 @ 05:10) [Active]          Vital Signs Last 24 Hrs  T(C): 37.1 (24 Nov 2022 08:21), Max: 37.1 (24 Nov 2022 08:21)  T(F): 98.7 (24 Nov 2022 08:21), Max: 98.7 (24 Nov 2022 08:21)  HR: 61 (24 Nov 2022 09:00) (50 - 112)  BP: 86/48 (24 Nov 2022 09:06) (69/39 - 215/62)  BP(mean): 51 (24 Nov 2022 09:06) (51 - 149)  RR: 7 (24 Nov 2022 09:00) (7 - 41)  SpO2: 83% (24 Nov 2022 09:00) (83% - 100%)    Parameters below as of 24 Nov 2022 08:00  Patient On (Oxygen Delivery Method): ventilator    O2 Concentration (%): 100      11-23-22 @ 07:01  -  11-24-22 @ 07:00  --------------------------------------------------------  IN: 1590.3 mL / OUT: 450 mL / NET: 1140.3 mL        Mode: AC/ CMV (Assist Control/ Continuous Mandatory Ventilation), RR (machine): 18, TV (machine): 350, FiO2: 50, PEEP: 5, ITime: 1, MAP: 12, PIP: 35      LABS:                        7.7    12.15 )-----------( 199      ( 24 Nov 2022 05:30 )             25.3     11-24    130<L>  |  97  |  28<H>  ----------------------------<  115<H>  4.8   |  28  |  1.13    Ca    7.5<L>      24 Nov 2022 05:30  Phos  3.0     11-24  Mg     2.3     11-24                WBC:  WBC Count: 12.15 K/uL (11-24 @ 05:30)  WBC Count: 8.82 K/uL (11-23 @ 16:08)  WBC Count: 11.71 K/uL (11-22 @ 06:00)  WBC Count: 16.01 K/uL (11-21 @ 03:56)      MICROBIOLOGY:  RECENT CULTURES:  11-21 Trach Asp Tracheal Aspirate Acinetobacter baumannii/nosocom group (Carbapenem Resistant)   Moderate polymorphonuclear leukocytes per low power field  No Squamous epithelial cells per low power field  No organisms seen   Moderate Acinetobacter baumannii/nosocomialis group  Normal Respiratory Elvira present    11-21 Clean Catch Clean Catch (Midstream) XXXX XXXX   <10,000 CFU/mL Normal Urogenital Elvira    11-21 .Blood Blood-Peripheral XXXX XXXX   No growth to date.                    Sodium:  Sodium, Serum: 130 mmol/L (11-24 @ 05:30)  Sodium, Serum: 137 mmol/L (11-22 @ 06:00)  Sodium, Serum: 143 mmol/L (11-21 @ 03:56)      1.13 mg/dL 11-24 @ 05:30  0.99 mg/dL 11-22 @ 06:00  1.25 mg/dL 11-21 @ 03:56      Hemoglobin:  Hemoglobin: 7.7 g/dL (11-24 @ 05:30)  Hemoglobin: 7.7 g/dL (11-23 @ 16:08)  Hemoglobin: 7.8 g/dL (11-22 @ 06:00)  Hemoglobin: 9.6 g/dL (11-21 @ 03:56)      Platelets: Platelet Count - Automated: 199 K/uL (11-24 @ 05:30)  Platelet Count - Automated: 181 K/uL (11-23 @ 16:08)  Platelet Count - Automated: 191 K/uL (11-22 @ 06:00)  Platelet Count - Automated: 290 K/uL (11-21 @ 03:56)              RADIOLOGY & ADDITIONAL STUDIES:      MICROBIOLOGY:  RECENT CULTURES:  11-21 Trach Asp Tracheal Aspirate Acinetobacter baumannii/nosocom group (Carbapenem Resistant)   Moderate polymorphonuclear leukocytes per low power field  No Squamous epithelial cells per low power field  No organisms seen   Moderate Acinetobacter baumannii/nosocomialis group  Normal Respiratory Elvira present    11-21 Clean Catch Clean Catch (Midstream) XXXX XXXX   <10,000 CFU/mL Normal Urogenital Elvira    11-21 .Blood Blood-Peripheral XXXX XXXX   No growth to date.

## 2022-11-24 NOTE — PROGRESS NOTE ADULT - SUBJECTIVE AND OBJECTIVE BOX
Date/Time Patient Seen:  		  Referring MD:   Data Reviewed	       Patient is a 77y old  Female who presents with a chief complaint of diaphoresis and fever (23 Nov 2022 19:55)      Subjective/HPI     PAST MEDICAL & SURGICAL HISTORY:  Dementia of frontal lobe type    Aphasic stroke    Diabetes mellitus    Respiratory failure    Hypertension    GERD (gastroesophageal reflux disease)    Constipation    Respiratory failure    CVA (cerebral vascular accident)    HTN (hypertension)    DM (diabetes mellitus)    Advanced dementia    COVID-19 virus detected    Quadriplegia    Pneumonia    Type II diabetes mellitus    Hx of appendectomy    Gastrostomy in place    Tracheostomy in place    Tracheostomy tube present    Feeding by G-tube          Medication list         MEDICATIONS  (STANDING):  albuterol    90 MICROgram(s) HFA Inhaler 2 Puff(s) Inhalation two times a day  chlorhexidine 0.12% Liquid 15 milliLiter(s) Oral Mucosa every 12 hours  chlorhexidine 2% Cloths 1 Application(s) Topical daily  dextrose 5%. 1000 milliLiter(s) (50 mL/Hr) IV Continuous <Continuous>  dextrose 5%. 1000 milliLiter(s) (100 mL/Hr) IV Continuous <Continuous>  dextrose 50% Injectable 25 Gram(s) IV Push once  dextrose 50% Injectable 12.5 Gram(s) IV Push once  dextrose 50% Injectable 25 Gram(s) IV Push once  glucagon  Injectable 1 milliGRAM(s) IntraMuscular once  heparin   Injectable 5000 Unit(s) SubCutaneous every 12 hours  insulin glargine Injectable (LANTUS) 8 Unit(s) SubCutaneous every morning  insulin lispro (ADMELOG) corrective regimen sliding scale   SubCutaneous every 6 hours  LORazepam     Tablet 1 milliGRAM(s) Oral every 4 hours  midodrine 15 milliGRAM(s) Oral every 8 hours  norepinephrine Infusion 0.05 MICROgram(s)/kG/Min (5.35 mL/Hr) IV Continuous <Continuous>  pantoprazole  Injectable 40 milliGRAM(s) IV Push daily  polyethylene glycol 3350 17 Gram(s) Oral every 12 hours  povidone iodine 10% Solution 1 Application(s) Topical daily  senna 2 Tablet(s) Oral at bedtime  trimethoprim / sulfamethoxazole IVPB 285 milliGRAM(s) IV Intermittent every 8 hours    MEDICATIONS  (PRN):  dextrose Oral Gel 15 Gram(s) Oral once PRN Blood Glucose LESS THAN 70 milliGRAM(s)/deciliter  LORazepam   Injectable 2 milliGRAM(s) IV Push every 6 hours PRN Agitation  sodium chloride 0.9% lock flush 10 milliLiter(s) IV Push every 1 hour PRN Pre/post blood products, medications, blood draw, and to maintain line patency         Vitals log        ICU Vital Signs Last 24 Hrs  T(C): 36.4 (23 Nov 2022 12:47), Max: 36.4 (23 Nov 2022 12:47)  T(F): 97.6 (23 Nov 2022 12:47), Max: 97.6 (23 Nov 2022 12:47)  HR: 96 (24 Nov 2022 06:00) (50 - 96)  BP: 85/69 (24 Nov 2022 05:00) (75/58 - 136/97)  BP(mean): 77 (24 Nov 2022 05:00) (57 - 110)  ABP: --  ABP(mean): --  RR: 41 (24 Nov 2022 06:00) (13 - 41)  SpO2: 100% (24 Nov 2022 05:00) (86% - 100%)    O2 Parameters below as of 23 Nov 2022 20:39  Patient On (Oxygen Delivery Method): ventilator,50%             Mode: AC/ CMV (Assist Control/ Continuous Mandatory Ventilation)  RR (machine): 18  TV (machine): 350  FiO2: 50  PEEP: 5  ITime: 1  MAP: 12  PIP: 30      Input and Output:  I&O's Detail    22 Nov 2022 07:01  -  23 Nov 2022 07:00  --------------------------------------------------------  IN:    Norepinephrine: 79.8 mL  Total IN: 79.8 mL    OUT:    Voided (mL): 450 mL  Total OUT: 450 mL    Total NET: -370.2 mL      23 Nov 2022 07:01  -  24 Nov 2022 06:12  --------------------------------------------------------  IN:    Glucerna 1.5: 1030 mL    IV PiggyBack: 500 mL    Norepinephrine: 60.3 mL  Total IN: 1590.3 mL    OUT:    Voided (mL): 450 mL  Total OUT: 450 mL    Total NET: 1140.3 mL          Lab Data                        7.7    8.82  )-----------( 181      ( 23 Nov 2022 16:08 )             25.3                   Review of Systems	      Objective     Physical Examination    heart s1s2  lung dec bS  head nc  trach      Pertinent Lab findings & Imaging      Annalise:  NO   Adequate UO     I&O's Detail    22 Nov 2022 07:01  -  23 Nov 2022 07:00  --------------------------------------------------------  IN:    Norepinephrine: 79.8 mL  Total IN: 79.8 mL    OUT:    Voided (mL): 450 mL  Total OUT: 450 mL    Total NET: -370.2 mL      23 Nov 2022 07:01  -  24 Nov 2022 06:12  --------------------------------------------------------  IN:    Glucerna 1.5: 1030 mL    IV PiggyBack: 500 mL    Norepinephrine: 60.3 mL  Total IN: 1590.3 mL    OUT:    Voided (mL): 450 mL  Total OUT: 450 mL    Total NET: 1140.3 mL               Discussed with:     Cultures:	        Radiology

## 2022-11-24 NOTE — PROGRESS NOTE ADULT - ASSESSMENT
REVIEW OF SYMPTOMS      Able to give (reliable) ROS  NO     PHYSICAL EXAM    HEENT Unremarkable  atraumatic   RESP Fair air entry EXP prolonged    Harsh breath sound Resp distres mild   CARDIAC S1 S2 No S3     NO JVD    ABDOMEN SOFT BS PRESENT NOT DISTENDED No hepatosplenomegaly   PEDAL EDEMA present No calf tenderness  NO rash       GENERAL DATA .   GOC.  full code      ALLGY.     codeine                        WT. 11/21/2022 57  BMI.    11/21/2022 21                          ICU STAY.   admitted ICU 11/21/2022  COVID.  Scv2 11/21/2022 scv2 (-)    PROCEDURE.  11/21/2022 [facility] central line    BEST PRACTICE ISSUES.    HOB ELEVATN. Yes  DVT PPLX.  11/21/2022 hpsc    SQUIRES PPLX.    11/21/2022 protonix 40   INFN PPLX.    11/21/2022 chlorhexidine .12% bid (mrsa)   11/21/2022 chlorhexidine 2% (c li)   SP SW EM.         DIET.  11/21/2022 glucerna 1.5 1000 gt               VS/ PO/IO/ VENT/ DRIPS.  11/24/2022 afeb 51 100/60m   11/24/2022 ac 18/350/5/50     CURRENT ADMISSION  Pt sent back from [facility] 11/21/2022 with fever abn labs Found yto be in shock Norepi started 11/21/2022   Pulm crit care consulted      RECENT ADMISSIONS.  11/12-11/16/2022 11/4-11/10/2022  11/5 stenotrophomonas  uc 11/4 vr enterococc 50-99 candida   11/4/2022 levaquin 750 dced 11/7 doxy  11/8 bactrim 250.3  10/18-11/2/2022 11/21 ADMISSION PROBLEMS.  Vent dependent   .. Trach poa 11/21/2022  RIJ 11/21/2022   INFECTN   .. VAP 11/21/2022  .. Decub ulcers   ...... trach 11/21 mod acinetobacter carbapenem resist   ...... Bactrim tid  11/21/2022  COPD  Shock   .. Norepi 11/21/2022-> 11/21-11/24  .. midodrine 11/21/2022   peg poa 11/21/2022  ANEMIA   Hyponatremia  .. Na 11/24/2022 Na 130       ASSESSMENT/RECOMMENDATIONS .   RESP.  .. Gas exchange.  11/21/2022 4a On 100% vent 759/36/273   Monitor & target po 90-95%  .. VENT MANAGEMENT.   HOB elevation  Target Pplat 30 (-)  Target PO 90-95%  Target pH 730 (+)  Daily spontaneous breathing trials   Daily sedation vacation   .. Pleural effsn .   CXR 11/21/2022 r gr than l effsn  Effusion has been tapped during prev admissions and is lp exudate If fever or worsening sepsis will plan thorac  .. Bronchospasm.  11/21/2022 albut hfa 2p bid   INFECTION.  .. SCV2 status.  Scv2 11/21/2022 scv2 (-)  .. VAP   w 11/21-11/23-11/24/2022 w 16 - 8.8 - 12  Pr 11/21-11/22/2022 pr 1.8 - 1.1  ua 11/21/2022 w 3-5   cxr 11/21 mod to large r effsn somewhat incr from 11/12 central infiltrates possibly congestive rij   rvp 11/21/2022 (-)   rsv 11/21/2022 (-)   uc 11/21 (-)   legn 11/21 (-)   trach 11/21 mod acinetobacter carbapenem resist   mrsa 11/21 (-)   bc 11/21 (-)   11/21/2022 bactrim 285 mg trimeth q 8 h Dr BRITTANY Brantley   CARDIAC.  .. Shock.    11/21/2022 midodrine 10.3 -> 11/22 midodr 15.3   11/21/2022 5a norepi 8 in 250   target map 65 (+)   .. ekg changes.  ekg 11/21/2022 s tach shoirt pr qtc 470 possible rvh   tr 11/22/2022 tr 28   GI.  .. Nutrition.   11/21/2022 glucerna 1.5 1000 gt    HEMAT.  .. DVT pplx.   11/21/2022 hpsc    .. anemia.  Hb 11/21-11/22-11/23-11/24/2022 Hb 9.6- 7.8 - 7.7 - 7.7    mcv 11/21/2022 mcv 90   inr 11/21/2022 inr 122   monitor target Hb 7 (+)   RENAL.  .. renal parameters.  Na 11/21-11/24/2022 Na 143 - 130   K 11/21-11/22/2022 K 5.4 - 3.7  CO2 11/21/2022 co2 31   Cr 11/21/2022 Cr 1.2   ag 11/21/2022 ag 11  alb 11/21/2022 alb 2.8   11/21/2022 lokelma 10 givn   IV fl.   .. 11/24/2022 ns 100 x 12 h   ENDOCRINE.   .. DM   11/21/2022 glarg 8 q am   11/21/2022 riss   NEURO.  .. seizures.  11/21/2022 lorazepam 1.6     TIME SPENT   Over 39 minutes aggregate critical care time spent on encounter; activities included   direct patient care, counseling and/or coordinating care reviewing notes, lab data/ imaging , discussion with multidisciplinary team/ patient  /family and explaining in detail risks, benefits, alternatives  of the recommendations     CHAPINCITO GUIDRY 76 f [facility] S 11/21/2022   DR JOSEPH DU

## 2022-11-24 NOTE — PROGRESS NOTE ADULT - SUBJECTIVE AND OBJECTIVE BOX
Patient is a 77y old  Female who presents with a chief complaint of diaphoresis and fever (24 Nov 2022 11:08)    PAST MEDICAL & SURGICAL HISTORY:  Dementia of frontal lobe type      Aphasic stroke      Diabetes mellitus      Respiratory failure      Hypertension      GERD (gastroesophageal reflux disease)      Constipation      Respiratory failure      CVA (cerebral vascular accident)      HTN (hypertension)      DM (diabetes mellitus)      Advanced dementia      COVID-19 virus detected      Quadriplegia      Pneumonia      Type II diabetes mellitus      Hx of appendectomy      Gastrostomy in place      Tracheostomy in place      Tracheostomy tube present      Feeding by G-tube        BREA BECKHAM   77y    Female    BRIEF HOSPITAL COURSE:    Review of Systems:                       All other ROS are negative.    Allergies    codeine (Hives)    Intolerances          ICU Vital Signs Last 24 Hrs  T(C): 36.8 (24 Nov 2022 19:25), Max: 37.1 (24 Nov 2022 08:21)  T(F): 98.2 (24 Nov 2022 19:25), Max: 98.7 (24 Nov 2022 08:21)  HR: 51 (24 Nov 2022 19:00) (50 - 112)  BP: 104/66 (24 Nov 2022 19:00) (69/39 - 215/62)  BP(mean): 79 (24 Nov 2022 19:00) (51 - 149)  ABP: --  ABP(mean): --  RR: 34 (24 Nov 2022 19:00) (7 - 41)  SpO2: 92% (24 Nov 2022 19:00) (83% - 100%)    O2 Parameters below as of 24 Nov 2022 19:00  Patient On (Oxygen Delivery Method): ventilator    O2 Concentration (%): 100      Physical Examination:    General:     HEENT:     PULM:     CVS:     ABD:     EXT:     SKIN:     Neuro:      Mode: AC/ CMV (Assist Control/ Continuous Mandatory Ventilation)  RR (machine): 18  TV (machine): 350  FiO2: 70  PEEP: 5  ITime: 1  MAP: 12  PIP: 32    Mode: AC/ CMV (Assist Control/ Continuous Mandatory Ventilation), RR (machine): 18, TV (machine): 350, FiO2: 70, PEEP: 5, ITime: 1, MAP: 12, PIP: 32  LABS:                        7.7    12.15 )-----------( 199      ( 24 Nov 2022 05:30 )             25.3     11-24    130<L>  |  97  |  28<H>  ----------------------------<  115<H>  4.8   |  28  |  1.13    Ca    7.5<L>      24 Nov 2022 05:30  Phos  3.0     11-24  Mg     2.3     11-24            CAPILLARY BLOOD GLUCOSE      POCT Blood Glucose.: 172 mg/dL (24 Nov 2022 17:34)  POCT Blood Glucose.: 168 mg/dL (24 Nov 2022 11:37)  POCT Blood Glucose.: 220 mg/dL (24 Nov 2022 08:15)  POCT Blood Glucose.: 121 mg/dL (24 Nov 2022 05:49)  POCT Blood Glucose.: 171 mg/dL (24 Nov 2022 00:00)        CULTURES:  Culture Results:   Moderate Acinetobacter baumannii/nosocom group (Carbapenem Resistant)  Normal Respiratory Elvira present (11-21 @ 20:14)  Rapid RVP Result: NotDetec (11-21 @ 05:02)  Culture Results:   <10,000 CFU/mL Normal Urogenital Elvira (11-21 @ 04:25)  Culture Results:   No growth to date. (11-21 @ 03:56)  Culture Results:   No growth to date. (11-21 @ 03:56)      Medications:  MEDICATIONS  (STANDING):  albuterol    90 MICROgram(s) HFA Inhaler 2 Puff(s) Inhalation two times a day  chlorhexidine 0.12% Liquid 15 milliLiter(s) Oral Mucosa every 12 hours  chlorhexidine 2% Cloths 1 Application(s) Topical daily  dextrose 5%. 1000 milliLiter(s) (50 mL/Hr) IV Continuous <Continuous>  dextrose 5%. 1000 milliLiter(s) (100 mL/Hr) IV Continuous <Continuous>  dextrose 50% Injectable 25 Gram(s) IV Push once  dextrose 50% Injectable 12.5 Gram(s) IV Push once  dextrose 50% Injectable 25 Gram(s) IV Push once  glucagon  Injectable 1 milliGRAM(s) IntraMuscular once  heparin   Injectable 5000 Unit(s) SubCutaneous every 12 hours  insulin glargine Injectable (LANTUS) 8 Unit(s) SubCutaneous every morning  insulin lispro (ADMELOG) corrective regimen sliding scale   SubCutaneous every 6 hours  LORazepam     Tablet 1 milliGRAM(s) Oral every 4 hours  midodrine 15 milliGRAM(s) Oral every 8 hours  norepinephrine Infusion 0.05 MICROgram(s)/kG/Min (5.35 mL/Hr) IV Continuous <Continuous>  pantoprazole  Injectable 40 milliGRAM(s) IV Push daily  polyethylene glycol 3350 17 Gram(s) Oral every 12 hours  povidone iodine 10% Solution 1 Application(s) Topical daily  senna 2 Tablet(s) Oral at bedtime  sodium chloride 0.9%. 1000 milliLiter(s) (100 mL/Hr) IV Continuous <Continuous>  trimethoprim / sulfamethoxazole IVPB 285 milliGRAM(s) IV Intermittent every 8 hours    MEDICATIONS  (PRN):  dextrose Oral Gel 15 Gram(s) Oral once PRN Blood Glucose LESS THAN 70 milliGRAM(s)/deciliter  LORazepam   Injectable 2 milliGRAM(s) IV Push every 6 hours PRN Agitation  sodium chloride 0.9% lock flush 10 milliLiter(s) IV Push every 1 hour PRN Pre/post blood products, medications, blood draw, and to maintain line patency        11-23 @ 07:01 - 11-24 @ 07:00  --------------------------------------------------------  IN: 1670.6 mL / OUT: 450 mL / NET: 1220.6 mL    11-24 @ 07:01 - 11-24 @ 19:36  --------------------------------------------------------  IN: 1815.3 mL / OUT: 560 mL / NET: 1255.3 mL        RADIOLOGY/IMAGING/ECHO    Critical care point of care ultrasound:    Assessment/Plan:          CRITICAL CARE TIME SPENT: 37 minutes assessing presenting problems of acute illness, which pose high probability of life threatening deterioration or end organ damage/dysfunction, as well as medical decision making including initiating plan of care, reviewing data, reviewing radiologic exams, discussing with multidisciplinary team,  discussing goals of care with patient/family, and writing this note.  Non-inclusive of procedures performed,         Patient is a 77y old  Female who presents with a chief complaint of diaphoresis and fever (24 Nov 2022 11:08)    PAST MEDICAL & SURGICAL HISTORY:  Dementia of frontal lobe type      Aphasic stroke      Diabetes mellitus      Respiratory failure      Hypertension      GERD (gastroesophageal reflux disease)      Constipation      Respiratory failure      CVA (cerebral vascular accident)      HTN (hypertension)      DM (diabetes mellitus)      Advanced dementia      COVID-19 virus detected      Quadriplegia      Pneumonia      Type II diabetes mellitus      Hx of appendectomy      Gastrostomy in place      Tracheostomy in place      Tracheostomy tube present      Feeding by G-tube        BREA BECKHAM   77y    Female    BRIEF HOSPITAL COURSE: No new events    Review of Systems:                       All other ROS are negative.    Allergies    codeine (Hives)    Intolerances          ICU Vital Signs Last 24 Hrs  T(C): 36.8 (24 Nov 2022 19:25), Max: 37.1 (24 Nov 2022 08:21)  T(F): 98.2 (24 Nov 2022 19:25), Max: 98.7 (24 Nov 2022 08:21)  HR: 51 (24 Nov 2022 19:00) (50 - 112)  BP: 104/66 (24 Nov 2022 19:00) (69/39 - 215/62)  BP(mean): 79 (24 Nov 2022 19:00) (51 - 149)  ABP: --  ABP(mean): --  RR: 34 (24 Nov 2022 19:00) (7 - 41)  SpO2: 92% (24 Nov 2022 19:00) (83% - 100%)    O2 Parameters below as of 24 Nov 2022 19:00  Patient On (Oxygen Delivery Method): ventilator    O2 Concentration (%): 100      Mode: AC/ CMV (Assist Control/ Continuous Mandatory Ventilation)  RR (machine): 18  TV (machine): 350  FiO2: 70  PEEP: 5  ITime: 1  MAP: 12  PIP: 32    Mode: AC/ CMV (Assist Control/ Continuous Mandatory Ventilation), RR (machine): 18, TV (machine): 350, FiO2: 70, PEEP: 5, ITime: 1, MAP: 12, PIP: 32  LABS:                        7.7    12.15 )-----------( 199      ( 24 Nov 2022 05:30 )             25.3     11-24    130<L>  |  97  |  28<H>  ----------------------------<  115<H>  4.8   |  28  |  1.13    Ca    7.5<L>      24 Nov 2022 05:30  Phos  3.0     11-24  Mg     2.3     11-24            CAPILLARY BLOOD GLUCOSE      POCT Blood Glucose.: 172 mg/dL (24 Nov 2022 17:34)  POCT Blood Glucose.: 168 mg/dL (24 Nov 2022 11:37)  POCT Blood Glucose.: 220 mg/dL (24 Nov 2022 08:15)  POCT Blood Glucose.: 121 mg/dL (24 Nov 2022 05:49)  POCT Blood Glucose.: 171 mg/dL (24 Nov 2022 00:00)        CULTURES:  Culture Results:   Moderate Acinetobacter baumannii/nosocom group (Carbapenem Resistant)  Normal Respiratory Elvira present (11-21 @ 20:14)  Rapid RVP Result: NotDetec (11-21 @ 05:02)  Culture Results:   <10,000 CFU/mL Normal Urogenital Elvira (11-21 @ 04:25)  Culture Results:   No growth to date. (11-21 @ 03:56)  Culture Results:   No growth to date. (11-21 @ 03:56)      Medications:  MEDICATIONS  (STANDING):  albuterol    90 MICROgram(s) HFA Inhaler 2 Puff(s) Inhalation two times a day  chlorhexidine 0.12% Liquid 15 milliLiter(s) Oral Mucosa every 12 hours  chlorhexidine 2% Cloths 1 Application(s) Topical daily  dextrose 5%. 1000 milliLiter(s) (50 mL/Hr) IV Continuous <Continuous>  dextrose 5%. 1000 milliLiter(s) (100 mL/Hr) IV Continuous <Continuous>  dextrose 50% Injectable 25 Gram(s) IV Push once  dextrose 50% Injectable 12.5 Gram(s) IV Push once  dextrose 50% Injectable 25 Gram(s) IV Push once  glucagon  Injectable 1 milliGRAM(s) IntraMuscular once  heparin   Injectable 5000 Unit(s) SubCutaneous every 12 hours  insulin glargine Injectable (LANTUS) 8 Unit(s) SubCutaneous every morning  insulin lispro (ADMELOG) corrective regimen sliding scale   SubCutaneous every 6 hours  LORazepam     Tablet 1 milliGRAM(s) Oral every 4 hours  midodrine 15 milliGRAM(s) Oral every 8 hours  norepinephrine Infusion 0.05 MICROgram(s)/kG/Min (5.35 mL/Hr) IV Continuous <Continuous>  pantoprazole  Injectable 40 milliGRAM(s) IV Push daily  polyethylene glycol 3350 17 Gram(s) Oral every 12 hours  povidone iodine 10% Solution 1 Application(s) Topical daily  senna 2 Tablet(s) Oral at bedtime  sodium chloride 0.9%. 1000 milliLiter(s) (100 mL/Hr) IV Continuous <Continuous>  trimethoprim / sulfamethoxazole IVPB 285 milliGRAM(s) IV Intermittent every 8 hours    MEDICATIONS  (PRN):  dextrose Oral Gel 15 Gram(s) Oral once PRN Blood Glucose LESS THAN 70 milliGRAM(s)/deciliter  LORazepam   Injectable 2 milliGRAM(s) IV Push every 6 hours PRN Agitation  sodium chloride 0.9% lock flush 10 milliLiter(s) IV Push every 1 hour PRN Pre/post blood products, medications, blood draw, and to maintain line patency        11-23 @ 07:01 - 11-24 @ 07:00  --------------------------------------------------------  IN: 1670.6 mL / OUT: 450 mL / NET: 1220.6 mL    11-24 @ 07:01 - 11-24 @ 19:36  --------------------------------------------------------  IN: 1815.3 mL / OUT: 560 mL / NET: 1255.3 mL        RADIOLOGY/IMAGING/ECHO    Critical care point of care ultrasound:    Assessment/Plan:    Cont ativan  Cont midorine  PRN norepi   Albuterol Nebs  Pulm Toilet  Vent support  Cont tube feeds  PPI  Insulin sliding Scale   Full code          NON CRITICAL CARE TIME SPENT: 37 minutes assessing patient

## 2022-11-24 NOTE — PROGRESS NOTE ADULT - ASSESSMENT
77F with dementia, COPD with chronic respiratory failure s/p trach, cardiac arrest, CHH, quadriplegia, CVA, CKD, anemia, PEG tube, and multiple hospital admissions for respiratory distress presents to the ED BIBEMS from [facility] for diaphoresis and fever.       on bactrim  TLC inserted  vs noted  labs reviewed  WOUND care eval noted  on Midodrine    GOC   pt is full code   is full code  assist with needs -   oral hygiene  skin care  recurrent admissions  long term resident of SNF  vent dep  anoxic brain injury - dementia - vegetative state

## 2022-11-24 NOTE — PROGRESS NOTE ADULT - SUBJECTIVE AND OBJECTIVE BOX
BREA BECKHAM is a 77yFemale , patient examined and chart reviewed.     INTERVAL HPI/ OVERNIGHT EVENTS:  Vegetative state. Afebrile.  Chronic vent.    Past Medical History--  PAST MEDICAL & SURGICAL HISTORY:  Dementia of frontal lobe type  Aphasic stroke  Diabetes mellitus  Respiratory failure  Hypertension  GERD (gastroesophageal reflux disease)  Constipation  Respiratory failure  CVA (cerebral vascular accident)  HTN (hypertension)  Advanced dementia  COVID-19 virus detected  Quadriplegia  Pneumoni  Type II diabetes mellitus  Hx of appendectomy  Gastrostomy in place  Tracheostomy in place        For details regarding the patient's social history, family history, and other miscellaneous elements, please refer the initial infectious diseases consultation and/or the admitting history and physical examination for this admission.      ROS:  Unable to obtain due to : pt's condition      ALLERGIES  codeine (Hives)      Current inpatient medications :    ANTIBIOTICS/RELEVANT:  trimethoprim / sulfamethoxazole IVPB 285 milliGRAM(s) IV Intermittent every 8 hours      albuterol    90 MICROgram(s) HFA Inhaler 2 Puff(s) Inhalation two times a day  chlorhexidine 2% Cloths 1 Application(s) Topical daily  dextrose 5%. 1000 milliLiter(s) IV Continuous <Continuous>  dextrose 5%. 1000 milliLiter(s) IV Continuous <Continuous>  dextrose 50% Injectable 25 Gram(s) IV Push once  dextrose 50% Injectable 12.5 Gram(s) IV Push once  dextrose 50% Injectable 25 Gram(s) IV Push once  dextrose Oral Gel 15 Gram(s) Oral once PRN  glucagon  Injectable 1 milliGRAM(s) IntraMuscular once  heparin   Injectable 5000 Unit(s) SubCutaneous every 12 hours  insulin glargine Injectable (LANTUS) 8 Unit(s) SubCutaneous every morning  insulin lispro (ADMELOG) corrective regimen sliding scale   SubCutaneous every 6 hours  LORazepam     Tablet 1 milliGRAM(s) Oral every 4 hours  LORazepam   Injectable 2 milliGRAM(s) IV Push every 6 hours PRN  midodrine 15 milliGRAM(s) Oral every 8 hours  norepinephrine Infusion 0.05 MICROgram(s)/kG/Min IV Continuous <Continuous>  pantoprazole  Injectable 40 milliGRAM(s) IV Push daily  polyethylene glycol 3350 17 Gram(s) Oral every 12 hours  povidone iodine 10% Solution 1 Application(s) Topical daily  senna 2 Tablet(s) Oral at bedtime  sodium chloride 0.9% lock flush 10 milliLiter(s) IV Push every 1 hour PRN  sodium chloride 0.9%. 1000 milliLiter(s) IV Continuous <Continuous>      Objective:    11-23 @ 07:01  -  11-24 @ 07:00  --------------------------------------------------------  IN: 1670.6 mL / OUT: 450 mL / NET: 1220.6 mL    11-24 @ 07:01  -  11-25 @ 01:00  --------------------------------------------------------  IN: 1815.3 mL / OUT: 560 mL / NET: 1255.3 mL      T(C): 36.8 (11-24-22 @ 23:48), Max: 37.1 (11-24-22 @ 08:21)  HR: 52 (11-24-22 @ 23:00) (49 - 112)  BP: 112/50 (11-24-22 @ 23:00) (69/39 - 215/62)  RR: 26 (11-24-22 @ 23:00) (7 - 41)  SpO2: 91% (11-24-22 @ 23:00) (83% - 100%)  Mode: AC/ CMV (Assist Control/ Continuous Mandatory Ventilation), RR (machine): 18, TV (machine): 350, FiO2: 100, PEEP: 8, ITime: 0.1, MAP: 13, PIP: 42    Physical Exam:  GEN: Chronic vent vegetative state  HEENT: normocephalic and atraumatic.   NECK: Supple.   LUNGS: Decreased  to auscultation.  HEART: Regular rate and rhythm without murmur.  ABDOMEN: Soft, nontender, and nondistended.  Positive bowel sounds.  +G tube  EXTREMITIES: Without any cyanosis, clubbing, rash, lesions or edema.  NEUROLOGIC: Unresponsive      LABS:                        7.7    12.15 )-----------( 199      ( 24 Nov 2022 05:30 )             25.3       11-24    130<L>  |  97  |  28<H>  ----------------------------<  115<H>  4.8   |  28  |  1.13    Ca    7.5<L>      24 Nov 2022 05:30  Phos  3.0     11-24  Mg     2.3     11-24        MICROBIOLOGY:  Culture - Sputum . (11.21.22 @ 20:14)    Gram Stain:   Moderate polymorphonuclear leukocytes per low power field  No Squamous epithelial cells per low power field  No organisms seen    -  Amikacin: S <=16    -  Ampicillin/Sulbactam: S 8/4    -  Cefepime: R >16    -  Ceftazidime: R >16    -  Ciprofloxacin: R >2    -  Gentamicin: I 8    -  Imipenem: R 8    -  Levofloxacin: R >4    -  Meropenem: R >8    -  Piperacillin/Tazobactam: R    -  Polymyxin B: I 0.25    -  Tobramycin: R >8    -  Trimethoprim/Sulfamethoxazole: R >2/38    -  Minocycline: R    Specimen Source: Trach Asp Tracheal Aspirate    Culture Results:   Moderate Acinetobacter baumannii/nosocom group (Carbapenem Resistant)  Normal Respiratory Elvira present    Organism Identification: Acinetobacter baumannii/nosocom group (Carbapenem Resistant)    Organism: Acinetobacter baumannii/nosocom group (Carbapenem Resistant)    Organism: Acinetobacter baumannii/nosocom group (Carbapenem Resistant)    Organism: Acinetobacter baumannii/nosocom group (Carbapenem Resistant)    Method Type: ETEST    Method Type: KB    Method Type: PRATIK          RADIOLOGY & ADDITIONAL STUDIES:    ACC: [id number] EXAM:  XR CHEST PORTABLE URGENT 1V                        ACC: [id number] EXAM:  XR CHEST PORTABLE URGENT 1V                          PROCEDURE DATE:  11/21/2022          INTERPRETATION:  AP chest on November 21, 2022 at 3:52 AM. Patient has   sepsis.    Heart magnified by technique. Tracheostomy remains.    Moderate to large right effusion somewhat increased from November 12.    Central infiltrates are again noted possibly congestive.    Follow-up AP chest on November 21, 2022 at 1:03 PM.    Right jugular line is been inserted. Congestion somewhat improved   effusions diminished.    Patient's hand obscures left base.    IMPRESSION: Initial CHF and right effusion improved on the latest study.   Right jugular line inserted.      Assessment :  77F with dementia, COPD with chronic respiratory failure s/p trach, cardiac arrest, CHH, quadriplegia, CVA, CKD, anemia, PEG tube, and multiple hospital admissions for respiratory distress presents to the ED BIBEMS from [facility] for diaphoresis and fever.     Patient was just discharged here from 11/12-11/16 for low grade fever, midline malfunction, leukocytosis, fever, concerning for sepsis possibly 2/2 recurent VAP.    Sputum cx 11/21 with Acinetobacter baumannii/nosocom group (Carbapenem Resistant) resistant to Bactrim      Plan:  Change to Unasyn  Trend temps and cbc  Asp precautions  Aggressive pulm toileting  Isolation per infection control policy  Vent management per ICU  Poor prognosis  Continued GOC      Continue with present regiment.  Appropriate use of antibiotics and adverse effects reviewed.      Critical care time greater then 35 minutes reviewing notes, labs data/ imaging , discussion with multidisciplinary team.    Thank you for allowing me to participate in care of your patient .        Eusebio Chairez MD  Infectious Disease  [phone]

## 2022-11-24 NOTE — PROGRESS NOTE ADULT - ASSESSMENT
77F with dementia, COPD with chronic respiratory failure s/p trach, cardiac arrest, CHH, quadriplegia, CVA, CKD, anemia, PEG tube, and multiple hospital admissions for respiratory distress presents to the ED BIBEMS from [facility] for diaphoresis and fever.       Sepsis 2/2 VAP - meets sepsis based on fever + tachycardia + source of infection.  Lactate 1.9  - on Bactrim per ID until 11/27  - cont with vent settings to maintain SaO2 >92%  - cont with midodrine 15mg TID and levo for pressure support   - on ativan for agitation  - MRSA neg, trach culture +acinetobacter   - palliative care consult    Hyponatremia  - Na of 130 today, likely hypovolemic  - will start IVF  - urine lytes ordered  - continue to trend    Leukocytosis  - WBC uptrending to 12 today  - no fevers  - continue Bactrim per ID, may need to broaden if worsening leukocytosis  - continue to trend     DM2 on insulin  - cont with lantus and insulin coverage scale with FS q6h while on TF    COPD   - cont with neb treatments    Anemia of chronic disease   - GI eval - conservative management   - cont with ferrous sulfate  - trend CBC    Prognosis grave, may need ethics eval

## 2022-11-25 LAB
ANION GAP SERPL CALC-SCNC: 4 MMOL/L — LOW (ref 5–17)
BUN SERPL-MCNC: 28 MG/DL — HIGH (ref 7–23)
CALCIUM SERPL-MCNC: 7.3 MG/DL — LOW (ref 8.4–10.5)
CHLORIDE SERPL-SCNC: 98 MMOL/L — SIGNIFICANT CHANGE UP (ref 96–108)
CO2 SERPL-SCNC: 29 MMOL/L — SIGNIFICANT CHANGE UP (ref 22–31)
CREAT SERPL-MCNC: 1.19 MG/DL — SIGNIFICANT CHANGE UP (ref 0.5–1.3)
EGFR: 47 ML/MIN/1.73M2 — LOW
GLUCOSE SERPL-MCNC: 125 MG/DL — HIGH (ref 70–99)
HCT VFR BLD CALC: 24.7 % — LOW (ref 34.5–45)
HGB BLD-MCNC: 7.4 G/DL — LOW (ref 11.5–15.5)
MCHC RBC-ENTMCNC: 26.6 PG — LOW (ref 27–34)
MCHC RBC-ENTMCNC: 30 GM/DL — LOW (ref 32–36)
MCV RBC AUTO: 88.8 FL — SIGNIFICANT CHANGE UP (ref 80–100)
NRBC # BLD: 0 /100 WBCS — SIGNIFICANT CHANGE UP (ref 0–0)
PLATELET # BLD AUTO: 197 K/UL — SIGNIFICANT CHANGE UP (ref 150–400)
POTASSIUM SERPL-MCNC: 5.1 MMOL/L — SIGNIFICANT CHANGE UP (ref 3.5–5.3)
POTASSIUM SERPL-SCNC: 5.1 MMOL/L — SIGNIFICANT CHANGE UP (ref 3.5–5.3)
RBC # BLD: 2.78 M/UL — LOW (ref 3.8–5.2)
RBC # FLD: 17.5 % — HIGH (ref 10.3–14.5)
SODIUM SERPL-SCNC: 131 MMOL/L — LOW (ref 135–145)
WBC # BLD: 9.01 K/UL — SIGNIFICANT CHANGE UP (ref 3.8–10.5)
WBC # FLD AUTO: 9.01 K/UL — SIGNIFICANT CHANGE UP (ref 3.8–10.5)

## 2022-11-25 PROCEDURE — 99232 SBSQ HOSP IP/OBS MODERATE 35: CPT

## 2022-11-25 RX ORDER — AMPICILLIN SODIUM AND SULBACTAM SODIUM 250; 125 MG/ML; MG/ML
3 INJECTION, POWDER, FOR SUSPENSION INTRAMUSCULAR; INTRAVENOUS EVERY 6 HOURS
Refills: 0 | Status: COMPLETED | OUTPATIENT
Start: 2022-11-25 | End: 2022-12-02

## 2022-11-25 RX ADMIN — Medication 5.35 MICROGRAM(S)/KG/MIN: at 11:37

## 2022-11-25 RX ADMIN — ALBUTEROL 2 PUFF(S): 90 AEROSOL, METERED ORAL at 20:41

## 2022-11-25 RX ADMIN — Medication 2: at 00:06

## 2022-11-25 RX ADMIN — HEPARIN SODIUM 5000 UNIT(S): 5000 INJECTION INTRAVENOUS; SUBCUTANEOUS at 05:39

## 2022-11-25 RX ADMIN — MIDODRINE HYDROCHLORIDE 15 MILLIGRAM(S): 2.5 TABLET ORAL at 22:00

## 2022-11-25 RX ADMIN — AMPICILLIN SODIUM AND SULBACTAM SODIUM 200 GRAM(S): 250; 125 INJECTION, POWDER, FOR SUSPENSION INTRAMUSCULAR; INTRAVENOUS at 12:28

## 2022-11-25 RX ADMIN — Medication 1 MILLIGRAM(S): at 10:39

## 2022-11-25 RX ADMIN — Medication 1 MILLIGRAM(S): at 06:09

## 2022-11-25 RX ADMIN — Medication 1 MILLIGRAM(S): at 13:35

## 2022-11-25 RX ADMIN — CHLORHEXIDINE GLUCONATE 1 APPLICATION(S): 213 SOLUTION TOPICAL at 11:30

## 2022-11-25 RX ADMIN — MIDODRINE HYDROCHLORIDE 15 MILLIGRAM(S): 2.5 TABLET ORAL at 13:35

## 2022-11-25 RX ADMIN — HEPARIN SODIUM 5000 UNIT(S): 5000 INJECTION INTRAVENOUS; SUBCUTANEOUS at 17:14

## 2022-11-25 RX ADMIN — Medication 1 MILLIGRAM(S): at 01:52

## 2022-11-25 RX ADMIN — MIDODRINE HYDROCHLORIDE 15 MILLIGRAM(S): 2.5 TABLET ORAL at 05:38

## 2022-11-25 RX ADMIN — AMPICILLIN SODIUM AND SULBACTAM SODIUM 200 GRAM(S): 250; 125 INJECTION, POWDER, FOR SUSPENSION INTRAMUSCULAR; INTRAVENOUS at 23:41

## 2022-11-25 RX ADMIN — ALBUTEROL 2 PUFF(S): 90 AEROSOL, METERED ORAL at 07:02

## 2022-11-25 RX ADMIN — Medication 2 MILLIGRAM(S): at 20:21

## 2022-11-25 RX ADMIN — Medication 1 MILLIGRAM(S): at 17:14

## 2022-11-25 RX ADMIN — Medication 1 MILLIGRAM(S): at 22:01

## 2022-11-25 RX ADMIN — PANTOPRAZOLE SODIUM 40 MILLIGRAM(S): 20 TABLET, DELAYED RELEASE ORAL at 11:30

## 2022-11-25 RX ADMIN — INSULIN GLARGINE 8 UNIT(S): 100 INJECTION, SOLUTION SUBCUTANEOUS at 07:37

## 2022-11-25 RX ADMIN — AMPICILLIN SODIUM AND SULBACTAM SODIUM 200 GRAM(S): 250; 125 INJECTION, POWDER, FOR SUSPENSION INTRAMUSCULAR; INTRAVENOUS at 05:39

## 2022-11-25 RX ADMIN — AMPICILLIN SODIUM AND SULBACTAM SODIUM 200 GRAM(S): 250; 125 INJECTION, POWDER, FOR SUSPENSION INTRAMUSCULAR; INTRAVENOUS at 17:14

## 2022-11-25 RX ADMIN — Medication 1 APPLICATION(S): at 11:36

## 2022-11-25 RX ADMIN — SENNA PLUS 2 TABLET(S): 8.6 TABLET ORAL at 22:01

## 2022-11-25 NOTE — PROGRESS NOTE ADULT - SUBJECTIVE AND OBJECTIVE BOX
BREA BECKHAM     [facility] 03    Allergies    codeine (Hives)    Intolerances        PAST MEDICAL & SURGICAL HISTORY:  Dementia of frontal lobe type      Aphasic stroke      Diabetes mellitus      Respiratory failure      Hypertension      GERD (gastroesophageal reflux disease)      Constipation      Respiratory failure      CVA (cerebral vascular accident)      HTN (hypertension)      DM (diabetes mellitus)      Advanced dementia      COVID-19 virus detected      Quadriplegia      Pneumonia      Type II diabetes mellitus      Hx of appendectomy      Gastrostomy in place      Tracheostomy in place      Tracheostomy tube present      Feeding by G-tube          FAMILY HISTORY:  No pertinent family history in first degree relatives        Home Medications:  Admelog 100 units/mL injectable solution: injectable every 6 hours SS (21 Nov 2022 05:08)  albuterol 90 mcg/inh inhalation aerosol with adapter: 2  inhaled every 6 hours (21 Nov 2022 04:24)  Bacid (LAC) oral tablet: 2 tab(s) by gastrostomy tube once a day (21 Nov 2022 04:24)  bacitracin 500 units/g topical ointment: Apply topically to affected area once a day to knees (21 Nov 2022 04:24)  chlorhexidine 0.12% mucous membrane liquid: 15 milliliter(s) mucous membrane 2 times a day (21 Nov 2022 04:24)  Dakins Full Strength 0.5% topical solution: Apply topically to affected area 2 times a day then apply santyl and calcium alginate (21 Nov 2022 04:24)  Eucerin topical cream: Apply topically to affected area once a day bilateral feet (21 Nov 2022 04:24)  ferrous sulfate 325 mg (65 mg elemental iron) oral tablet: 1 tab(s) by gastrostomy tube once a day (21 Nov 2022 04:24)  insulin glargine 100 units/mL subcutaneous solution: 8 unit(s) subcutaneous once a day (in the morning) (21 Nov 2022 04:24)  ipratropium-albuterol 0.5 mg-2.5 mg/3 mL inhalation solution: 3 milliliter(s) inhaled every 6 hours (21 Nov 2022 04:24)  LORazepam 1 mg oral tablet: 1 tab(s) by gastrostomy tube every 4 hours (21 Nov 2022 04:24)  midodrine 10 mg oral tablet: 1 tab(s) by gastrostomy tube every 8 hours (21 Nov 2022 04:24)  MiraLax oral powder for reconstitution: orally once a day (at bedtime) (21 Nov 2022 05:08)  mulivitamin: by gastrostomy tube once a day (21 Nov 2022 04:24)  nystatin 100,000 units/g topical powder: 1 application topically 3 times a day (21 Nov 2022 04:24)  omeprazole 40 mg oral delayed release capsule: 1 cap(s) by gastrostomy tube 2 times a day (21 Nov 2022 04:24)  polyethylene glycol 3350 oral powder for reconstitution: 17 gram(s) by gastrostomy tube every 12 hours (21 Nov 2022 04:24)  senna 8.6 mg oral tablet: 3 tab(s) by gastrostomy tube once a day (at bedtime) (21 Nov 2022 04:24)  simethicone 80 mg oral tablet, chewable: 1 tab(s) by gastrostomy tube every 6 hours (21 Nov 2022 04:24)  sucralfate 1 g/10 mL oral suspension: 10 milliliter(s) g-tube 4 times a day (before meals and at bedtime) (21 Nov 2022 04:24)  Tylenol 325 mg oral tablet: 2 tab(s) orally every 6 hours, As Needed prior to dressing change (21 Nov 2022 04:24)      MEDICATIONS  (STANDING):  albuterol    90 MICROgram(s) HFA Inhaler 2 Puff(s) Inhalation two times a day  ampicillin/sulbactam  IVPB 3 Gram(s) IV Intermittent every 6 hours  chlorhexidine 2% Cloths 1 Application(s) Topical daily  dextrose 5%. 1000 milliLiter(s) (50 mL/Hr) IV Continuous <Continuous>  dextrose 5%. 1000 milliLiter(s) (100 mL/Hr) IV Continuous <Continuous>  dextrose 50% Injectable 25 Gram(s) IV Push once  dextrose 50% Injectable 12.5 Gram(s) IV Push once  dextrose 50% Injectable 25 Gram(s) IV Push once  glucagon  Injectable 1 milliGRAM(s) IntraMuscular once  heparin   Injectable 5000 Unit(s) SubCutaneous every 12 hours  insulin glargine Injectable (LANTUS) 8 Unit(s) SubCutaneous every morning  insulin lispro (ADMELOG) corrective regimen sliding scale   SubCutaneous every 6 hours  LORazepam     Tablet 1 milliGRAM(s) Oral every 4 hours  midodrine 15 milliGRAM(s) Oral every 8 hours  norepinephrine Infusion 0.05 MICROgram(s)/kG/Min (5.35 mL/Hr) IV Continuous <Continuous>  pantoprazole  Injectable 40 milliGRAM(s) IV Push daily  polyethylene glycol 3350 17 Gram(s) Oral every 12 hours  povidone iodine 10% Solution 1 Application(s) Topical daily  senna 2 Tablet(s) Oral at bedtime  sodium chloride 0.9%. 1000 milliLiter(s) (100 mL/Hr) IV Continuous <Continuous>    MEDICATIONS  (PRN):  dextrose Oral Gel 15 Gram(s) Oral once PRN Blood Glucose LESS THAN 70 milliGRAM(s)/deciliter  LORazepam   Injectable 2 milliGRAM(s) IV Push every 6 hours PRN Agitation  sodium chloride 0.9% lock flush 10 milliLiter(s) IV Push every 1 hour PRN Pre/post blood products, medications, blood draw, and to maintain line patency      Diet, NPO with Tube Feed:   Tube Feeding Modality: Gastrostomy  Glucerna 1.5 Horacio  Total Volume for 24 Hours (mL): 1000  Continuous  Starting Tube Feed Rate mL per Hour: 10  Increase Tube Feed Rate by (mL): 10     Every 10 hours  Until Goal Tube Feed Rate (mL per Hour): 50  Tube Feed Duration (in Hours): 20  Tube Feed Start Time: 17:00  Free Water Flush  Pump   Rate (mL per Hour): 25   Frequency: Every Hour    Duration (Hours): 24 (11-21-22 @ 05:10) [Active]          Vital Signs Last 24 Hrs  T(C): 36.9 (25 Nov 2022 08:43), Max: 36.9 (25 Nov 2022 08:43)  T(F): 98.5 (25 Nov 2022 08:43), Max: 98.5 (25 Nov 2022 08:43)  HR: 52 (25 Nov 2022 10:36) (49 - 76)  BP: 122/59 (25 Nov 2022 10:00) (88/56 - 132/55)  BP(mean): 79 (25 Nov 2022 10:00) (62 - 118)  RR: 17 (25 Nov 2022 10:00) (13 - 34)  SpO2: 95% (25 Nov 2022 10:36) (89% - 100%)    Parameters below as of 25 Nov 2022 10:00  Patient On (Oxygen Delivery Method): ventilator    O2 Concentration (%): 100      11-24-22 @ 07:01  -  11-25-22 @ 07:00  --------------------------------------------------------  IN: 2047.7 mL / OUT: 1310 mL / NET: 737.7 mL    11-25-22 @ 07:01  -  11-25-22 @ 11:17  --------------------------------------------------------  IN: 329.6 mL / OUT: 0 mL / NET: 329.6 mL        Mode: AC/ CMV (Assist Control/ Continuous Mandatory Ventilation), RR (machine): 18, TV (machine): 350, FiO2: 100, PEEP: 8, ITime: 1, MAP: 20, PIP: 48      LABS:                        7.4    9.01  )-----------( 197      ( 25 Nov 2022 05:54 )             24.7     11-25    131<L>  |  98  |  28<H>  ----------------------------<  125<H>  5.1   |  29  |  1.19    Ca    7.3<L>      25 Nov 2022 05:54  Phos  3.0     11-24  Mg     2.3     11-24                WBC:  WBC Count: 9.01 K/uL (11-25 @ 05:54)  WBC Count: 12.15 K/uL (11-24 @ 05:30)  WBC Count: 8.82 K/uL (11-23 @ 16:08)  WBC Count: 11.71 K/uL (11-22 @ 06:00)      MICROBIOLOGY:  RECENT CULTURES:  11-21 Trach Asp Tracheal Aspirate Acinetobacter baumannii/nosocom group (Carbapenem Resistant)   Moderate polymorphonuclear leukocytes per low power field  No Squamous epithelial cells per low power field  No organisms seen   Moderate Acinetobacter baumannii/nosocom group (Carbapenem Resistant)  Normal Respiratory Elvira present    11-21 Clean Catch Clean Catch (Midstream) XXXX XXXX   <10,000 CFU/mL Normal Urogenital Elvira    11-21 .Blood Blood-Peripheral XXXX XXXX   No growth to date.                    Sodium:  Sodium, Serum: 131 mmol/L (11-25 @ 05:54)  Sodium, Serum: 130 mmol/L (11-24 @ 05:30)  Sodium, Serum: 137 mmol/L (11-22 @ 06:00)      1.19 mg/dL 11-25 @ 05:54  1.13 mg/dL 11-24 @ 05:30  0.99 mg/dL 11-22 @ 06:00      Hemoglobin:  Hemoglobin: 7.4 g/dL (11-25 @ 05:54)  Hemoglobin: 7.7 g/dL (11-24 @ 05:30)  Hemoglobin: 7.7 g/dL (11-23 @ 16:08)  Hemoglobin: 7.8 g/dL (11-22 @ 06:00)      Platelets: Platelet Count - Automated: 197 K/uL (11-25 @ 05:54)  Platelet Count - Automated: 199 K/uL (11-24 @ 05:30)  Platelet Count - Automated: 181 K/uL (11-23 @ 16:08)  Platelet Count - Automated: 191 K/uL (11-22 @ 06:00)              RADIOLOGY & ADDITIONAL STUDIES:      MICROBIOLOGY:  RECENT CULTURES:  11-21 Trach Asp Tracheal Aspirate Acinetobacter baumannii/nosocom group (Carbapenem Resistant)   Moderate polymorphonuclear leukocytes per low power field  No Squamous epithelial cells per low power field  No organisms seen   Moderate Acinetobacter baumannii/nosocom group (Carbapenem Resistant)  Normal Respiratory Elvira present    11-21 Clean Catch Clean Catch (Midstream) XXXX XXXX   <10,000 CFU/mL Normal Urogenital Elvira    11-21 .Blood Blood-Peripheral XXXX XXXX   No growth to date.

## 2022-11-25 NOTE — PROGRESS NOTE ADULT - ASSESSMENT
REVIEW OF SYMPTOMS      Able to give (reliable) ROS  NO     PHYSICAL EXAM    HEENT Unremarkable  atraumatic   RESP Fair air entry EXP prolonged    Harsh breath sound Resp distres mild   CARDIAC S1 S2 No S3     NO JVD    ABDOMEN SOFT BS PRESENT NOT DISTENDED No hepatosplenomegaly   PEDAL EDEMA present No calf tenderness  NO rash       GENERAL DATA .   GOC.  full code      ALLGY.     codeine                        WT. 11/21/2022 57  BMI.    11/21/2022 21                          ICU STAY.   admitted ICU 11/21/2022  COVID.  Scv2 11/21/2022 scv2 (-)    PROCEDURE.  11/21/2022 [facility] central line    BEST PRACTICE ISSUES.    HOB ELEVATN. Yes  DVT PPLX.  11/21/2022 hpsc    SQUIRES PPLX.    11/21/2022 protonix 40   INFN PPLX.    11/21/2022 chlorhexidine .12% bid (mrsa)   11/21/2022 chlorhexidine 2% (c li)   SP SW EM.         DIET.  11/21/2022 glucerna 1.5 1000 gt               VS/ PO/IO/ VENT/ DRIPS.  11/25/2022 54 130/50   11/25/2022 10a nore .07 m/k/m   11/25/2022 ac 18/350/8/100       CURRENT ADMISSION  Pt sent back from [facility] 11/21/2022 with fever abn labs Found yto be in shock Norepi started 11/21/2022   Pulm crit care consulted      RECENT ADMISSIONS.  11/12-11/16/2022 11/4-11/10/2022  11/5 stenotrophomonas  uc 11/4 vr enterococc 50-99 candida   11/4/2022 levaquin 750 dced 11/7 doxy  11/8 bactrim 250.3  10/18-11/2/2022 11/21 ADMISSION PROBLEMS.  Vent dependent   .. Trach poa 11/21/2022  .. ac 18/350/8/100   RIJ 11/21/2022   INFECTN   .. VAP 11/21/2022  .. Decub ulcers   ...... trach 11/21 mod acinetobacter carbapenem resist   ...... Bactrim tid  11/21-11/25/2022  ...... 11/25/2022 Unasyn 3.4 x 7d Dr Chairez    COPD  Shock   .. Norepi 11/21/2022-> 11/21-11/25  .. midodrine 11/21/2022   peg poa 11/21/2022  ANEMIA   .. Hb 11/25/2022 Hb 7.5  Hyponatremia  .. Na 11/24-11/25/2022 Na 130 - 131        ASSESSMENT/RECOMMENDATIONS .   RESP.  .. Gas exchange.  11/21/2022 4a On 100% vent 759/36/273   Monitor & target po 90-95%  .. VENT MANAGEMENT.   HOB elevation  Target Pplat 30 (-)  Target PO 90-95%  Target pH 730 (+)  Daily spontaneous breathing trials   Daily sedation vacation   .. Pleural effsn .   CXR 11/21/2022 r gr than l effsn  Effusion has been tapped during prev admissions and is lp exudate If fever or worsening sepsis will plan thorac  .. Bronchospasm.  11/21/2022 albut hfa 2p bid   INFECTION.  .. SCV2 status.  Scv2 11/21/2022 scv2 (-)  .. VAP   w 11/21-11/23-11/24-11/25/2022 w 16 - 8.8 - 12- 9   Pr 11/21-11/22/2022 pr 1.8 - 1.1  ua 11/21/2022 w 3-5   cxr 11/21 mod to large r effsn somewhat incr from 11/12 central infiltrates possibly congestive rij   rvp 11/21/2022 (-)   rsv 11/21/2022 (-)   uc 11/21 (-)   legn 11/21 (-)   trach 11/21 mod acinetobacter carbapenem resist   mrsa 11/21 (-)   bc 11/21 (-)   11/21/2022 bactrim 285 mg trimeth q 8 h Dr BRITTANY Brantley DCED 11/25/2022 11/25/2022 Unasyn 3.4 x 7d Dr Chairez    CARDIAC.  .. Shock.    11/21/2022 midodrine 10.3 -> 11/22 midodr 15.3   11/21/2022 5a norepi 8 in 250   target map 65 (+)   .. ekg changes.  ekg 11/21/2022 s tach shoirt pr qtc 470 possible rvh   tr 11/22/2022 tr 28   GI.  .. Nutrition.   11/21/2022 glucerna 1.5 1000 gt    HEMAT.  .. DVT pplx.   11/21/2022 hpsc    .. anemia.  Hb 11/21-11/22-11/23-11/24-11/25/2022   Hb 9.6- 7.8 - 7.7 - 7.7- 7.5    mcv 11/21/2022 mcv 90   inr 11/21/2022 inr 122   monitor target Hb 7 (+)   RENAL.  .. renal parameters.  Na 11/21-11/24-11/25/2022   Na 143 - 130 - 131   K 11/21-11/22/2022 K 5.4 - 3.7  CO2 11/21/2022 co2 31   Cr 11/21/2022 Cr 1.2   ag 11/21/2022 ag 11  alb 11/21/2022 alb 2.8   11/21/2022 lokelma 10 givn   IV fl.   .. 11/24/2022 ns 100 x 12 h   ENDOCRINE.   .. DM   11/21/2022 glarg 8 q am   11/21/2022 riss   NEURO.  .. seizures.  11/21/2022 lorazepam 1.6     TIME SPENT   Over 39 minutes aggregate critical care time spent on encounter; activities included   direct patient care, counseling and/or coordinating care reviewing notes, lab data/ imaging , discussion with multidisciplinary team/ patient  /family and explaining in detail risks, benefits, alternatives  of the recommendations     CHAPINCITO GUIDRY 76 f [facility] S 11/21/2022   DR JOSEPH DU

## 2022-11-25 NOTE — PROGRESS NOTE ADULT - ASSESSMENT
77F with dementia, COPD with chronic respiratory failure s/p trach, cardiac arrest, CHH, quadriplegia, CVA, CKD, anemia, PEG tube, and multiple hospital admissions for respiratory distress presents to the ED BIBEMS from [facility] for diaphoresis and fever.       Sepsis 2/2 VAP - meets sepsis based on fever + tachycardia + source of infection.  Lactate 1.9  - on Bactrim per ID until 11/27  - cont with vent settings to maintain SaO2 >92%  - cont with midodrine 15mg TID and levo for pressure support   - on ativan for agitation  - MRSA neg, trach culture +acinetobacter   - palliative care consult  - pt remains on 100% Fios, unable to wean    Hyponatremia  - Na of 130 today, likely hypovolemic  - will start IVF  - urine lytes ordered  - continue to trend    Leukocytosis  - WBC WNL  - no fevers  - was on bactrim, changed to unasyn  - continue to trend     DM2 on insulin  - cont with lantus and insulin coverage scale with FS q6h while on TF    COPD   - cont with neb treatments    Anemia of chronic disease   - GI eval - conservative management   - cont with ferrous sulfate  - trend CBC    Prognosis grave, may need ethics eval

## 2022-11-25 NOTE — PROGRESS NOTE ADULT - SUBJECTIVE AND OBJECTIVE BOX
Patient is a 77y old  Female who presents with a chief complaint of diaphoresis and fever (25 Nov 2022 06:09)      INTERVAL HPI/OVERNIGHT EVENTS:    pt continues to be on 100%FIO2  decreased it , pt becomes agitated and hypoxic.     MEDICATIONS  (STANDING):  albuterol    90 MICROgram(s) HFA Inhaler 2 Puff(s) Inhalation two times a day  ampicillin/sulbactam  IVPB 3 Gram(s) IV Intermittent every 6 hours  chlorhexidine 2% Cloths 1 Application(s) Topical daily  dextrose 5%. 1000 milliLiter(s) (50 mL/Hr) IV Continuous <Continuous>  dextrose 5%. 1000 milliLiter(s) (100 mL/Hr) IV Continuous <Continuous>  dextrose 50% Injectable 25 Gram(s) IV Push once  dextrose 50% Injectable 12.5 Gram(s) IV Push once  dextrose 50% Injectable 25 Gram(s) IV Push once  glucagon  Injectable 1 milliGRAM(s) IntraMuscular once  heparin   Injectable 5000 Unit(s) SubCutaneous every 12 hours  insulin glargine Injectable (LANTUS) 8 Unit(s) SubCutaneous every morning  insulin lispro (ADMELOG) corrective regimen sliding scale   SubCutaneous every 6 hours  LORazepam     Tablet 1 milliGRAM(s) Oral every 4 hours  midodrine 15 milliGRAM(s) Oral every 8 hours  norepinephrine Infusion 0.05 MICROgram(s)/kG/Min (5.35 mL/Hr) IV Continuous <Continuous>  pantoprazole  Injectable 40 milliGRAM(s) IV Push daily  polyethylene glycol 3350 17 Gram(s) Oral every 12 hours  povidone iodine 10% Solution 1 Application(s) Topical daily  senna 2 Tablet(s) Oral at bedtime  sodium chloride 0.9%. 1000 milliLiter(s) (100 mL/Hr) IV Continuous <Continuous>    MEDICATIONS  (PRN):  dextrose Oral Gel 15 Gram(s) Oral once PRN Blood Glucose LESS THAN 70 milliGRAM(s)/deciliter  LORazepam   Injectable 2 milliGRAM(s) IV Push every 6 hours PRN Agitation  sodium chloride 0.9% lock flush 10 milliLiter(s) IV Push every 1 hour PRN Pre/post blood products, medications, blood draw, and to maintain line patency      Allergies    codeine (Hives)    Intolerances        REVIEW OF SYSTEMS:  unable to obtain 2/2 condition    Vital Signs Last 24 Hrs  T(C): 36.7 (25 Nov 2022 04:00), Max: 36.8 (24 Nov 2022 12:02)  T(F): 98 (25 Nov 2022 04:00), Max: 98.2 (24 Nov 2022 12:02)  HR: 56 (25 Nov 2022 07:05) (49 - 100)  BP: 132/55 (25 Nov 2022 07:00) (69/39 - 132/55)  BP(mean): 79 (25 Nov 2022 07:00) (51 - 118)  RR: 28 (25 Nov 2022 07:00) (7 - 34)  SpO2: 99% (25 Nov 2022 07:05) (83% - 100%)    Parameters below as of 25 Nov 2022 07:02  Patient On (Oxygen Delivery Method): ventilator,100        PHYSICAL EXAM:  CONSTITUTIONAL: Trach, NAD  HEENT: pupils dilated b/l, R gaze preference   RESPIRATORY: Course breath sound b/l  CARDIOVASCULAR: Regular rate and rhythm, anasarca, dependent edema  ABDOMEN: +minimal BS, distended (Per RN, at baseline), +PEG  PSYCH: unable to assess  NEUROLOGY: unable to assess    LABS:                        7.4    9.01  )-----------( 197      ( 25 Nov 2022 05:54 )             24.7     25 Nov 2022 05:54    131    |  98     |  28     ----------------------------<  125    5.1     |  29     |  1.19     Ca    7.3        25 Nov 2022 05:54          CAPILLARY BLOOD GLUCOSE      POCT Blood Glucose.: 125 mg/dL (25 Nov 2022 06:04)  POCT Blood Glucose.: 171 mg/dL (24 Nov 2022 23:55)  POCT Blood Glucose.: 172 mg/dL (24 Nov 2022 17:34)  POCT Blood Glucose.: 168 mg/dL (24 Nov 2022 11:37)      RADIOLOGY & ADDITIONAL TESTS:

## 2022-11-25 NOTE — PROGRESS NOTE ADULT - SUBJECTIVE AND OBJECTIVE BOX
INTERVAL HPI/OVERNIGHT EVENTS:  No new overnight event.  No N/V/D.  Tolerating diet.   MEDICATIONS  (STANDING):  albuterol    90 MICROgram(s) HFA Inhaler 2 Puff(s) Inhalation two times a day  ampicillin/sulbactam  IVPB 3 Gram(s) IV Intermittent every 6 hours  chlorhexidine 2% Cloths 1 Application(s) Topical daily  dextrose 5%. 1000 milliLiter(s) (50 mL/Hr) IV Continuous <Continuous>  dextrose 5%. 1000 milliLiter(s) (100 mL/Hr) IV Continuous <Continuous>  dextrose 50% Injectable 25 Gram(s) IV Push once  dextrose 50% Injectable 12.5 Gram(s) IV Push once  dextrose 50% Injectable 25 Gram(s) IV Push once  glucagon  Injectable 1 milliGRAM(s) IntraMuscular once  heparin   Injectable 5000 Unit(s) SubCutaneous every 12 hours  insulin glargine Injectable (LANTUS) 8 Unit(s) SubCutaneous every morning  insulin lispro (ADMELOG) corrective regimen sliding scale   SubCutaneous every 6 hours  LORazepam     Tablet 1 milliGRAM(s) Oral every 4 hours  midodrine 15 milliGRAM(s) Oral every 8 hours  norepinephrine Infusion 0.05 MICROgram(s)/kG/Min (5.35 mL/Hr) IV Continuous <Continuous>  pantoprazole  Injectable 40 milliGRAM(s) IV Push daily  polyethylene glycol 3350 17 Gram(s) Oral every 12 hours  povidone iodine 10% Solution 1 Application(s) Topical daily  senna 2 Tablet(s) Oral at bedtime  sodium chloride 0.9%. 1000 milliLiter(s) (100 mL/Hr) IV Continuous <Continuous>    MEDICATIONS  (PRN):  dextrose Oral Gel 15 Gram(s) Oral once PRN Blood Glucose LESS THAN 70 milliGRAM(s)/deciliter  LORazepam   Injectable 2 milliGRAM(s) IV Push every 6 hours PRN Agitation  sodium chloride 0.9% lock flush 10 milliLiter(s) IV Push every 1 hour PRN Pre/post blood products, medications, blood draw, and to maintain line patency      Allergies    codeine (Hives)    Intolerances        Review of Systems:    General:  No wt loss, fevers, chills, night sweats,fatigue,   Eyes:  Good vision, no reported pain  ENT:  No sore throat, pain, runny nose, dysphagia  CV:  No pain, palpitatioins, hypo/hypertension  Resp:  No dyspnea, cough, tachypnea, wheezing  GI:  No pain, No nausea, No vomiting, No diarrhea, No constipatiion, No weight loss, No fever, No pruritis, No rectal bleeding, No tarry stools, No dysphagia,  :  No pain, bleeding, incontinence, nocturia  Muscle:  No pain, weakness  Neuro:  No weakness, tingling, memory problems  Psych:  No fatigue, insomnia, mood problems, depression  Endocrine:  No polyuria, polydypsia, cold/heat intolerance  Heme:  No petechiae, ecchymosis, easy bruisability  Skin:  No rash, tattoos, scars, edema      Vital Signs Last 24 Hrs  T(C): 36.9 (25 Nov 2022 08:43), Max: 36.9 (25 Nov 2022 08:43)  T(F): 98.5 (25 Nov 2022 08:43), Max: 98.5 (25 Nov 2022 08:43)  HR: 52 (25 Nov 2022 10:36) (49 - 76)  BP: 122/59 (25 Nov 2022 10:00) (88/56 - 132/55)  BP(mean): 79 (25 Nov 2022 10:00) (62 - 118)  RR: 17 (25 Nov 2022 10:00) (13 - 34)  SpO2: 95% (25 Nov 2022 10:36) (89% - 100%)    Parameters below as of 25 Nov 2022 10:00  Patient On (Oxygen Delivery Method): ventilator    O2 Concentration (%): 100    PHYSICAL EXAM:    Constitutional: NAD, well-developed  HEENT: EOMI, throat clear  Neck: No LAD, supple  Respiratory: CTA and P  Cardiovascular: S1 and S2, RRR, no M  Gastrointestinal: BS+, soft, NT/ND, neg HSM,  Extremities: No peripheral edema, neg clubing, cyanosis  Vascular: 2+ peripheral pulses  Neurological: A/O x 3, no focal deficits  Psychiatric: Normal mood, normal affect  Skin: No rashes      LABS:                        7.4    9.01  )-----------( 197      ( 25 Nov 2022 05:54 )             24.7     11-25    131<L>  |  98  |  28<H>  ----------------------------<  125<H>  5.1   |  29  |  1.19    Ca    7.3<L>      25 Nov 2022 05:54  Phos  3.0     11-24  Mg     2.3     11-24            RADIOLOGY & ADDITIONAL TESTS:

## 2022-11-25 NOTE — PROGRESS NOTE ADULT - SUBJECTIVE AND OBJECTIVE BOX
CC:  Patient is a 77y old  Female who presents with a chief complaint of diaphoresis and fever (25 Nov 2022 11:45)      PAST MEDICAL & SURGICAL HISTORY:  Dementia of frontal lobe type      Aphasic stroke      Diabetes mellitus      Respiratory failure      Hypertension      GERD (gastroesophageal reflux disease)      Constipation      Respiratory failure      CVA (cerebral vascular accident)      HTN (hypertension)      DM (diabetes mellitus)      Advanced dementia      COVID-19 virus detected      Quadriplegia      Pneumonia      Type II diabetes mellitus      Hx of appendectomy      Gastrostomy in place      Tracheostomy in place      Tracheostomy tube present      Feeding by G-tube        Allergies    codeine (Hives)    Intolerances      FAMILY HISTORY:  No pertinent family history in first degree relatives        Review of Systems:  Negative 2/2 mental status    Medications:  ampicillin/sulbactam  IVPB 3 Gram(s) IV Intermittent every 6 hours    midodrine 15 milliGRAM(s) Oral every 8 hours  norepinephrine Infusion 0.05 MICROgram(s)/kG/Min IV Continuous <Continuous>    albuterol    90 MICROgram(s) HFA Inhaler 2 Puff(s) Inhalation two times a day    LORazepam     Tablet 1 milliGRAM(s) Oral every 4 hours  LORazepam   Injectable 2 milliGRAM(s) IV Push every 6 hours PRN      heparin   Injectable 5000 Unit(s) SubCutaneous every 12 hours    pantoprazole  Injectable 40 milliGRAM(s) IV Push daily  polyethylene glycol 3350 17 Gram(s) Oral every 12 hours  senna 2 Tablet(s) Oral at bedtime      dextrose 50% Injectable 25 Gram(s) IV Push once  dextrose 50% Injectable 12.5 Gram(s) IV Push once  dextrose 50% Injectable 25 Gram(s) IV Push once  dextrose Oral Gel 15 Gram(s) Oral once PRN  glucagon  Injectable 1 milliGRAM(s) IntraMuscular once  insulin glargine Injectable (LANTUS) 8 Unit(s) SubCutaneous every morning  insulin lispro (ADMELOG) corrective regimen sliding scale   SubCutaneous every 6 hours    dextrose 5%. 1000 milliLiter(s) IV Continuous <Continuous>  dextrose 5%. 1000 milliLiter(s) IV Continuous <Continuous>  sodium chloride 0.9% lock flush 10 milliLiter(s) IV Push every 1 hour PRN  sodium chloride 0.9%. 1000 milliLiter(s) IV Continuous <Continuous>      chlorhexidine 2% Cloths 1 Application(s) Topical daily  povidone iodine 10% Solution 1 Application(s) Topical daily        Mode: AC/ CMV (Assist Control/ Continuous Mandatory Ventilation)  RR (machine): 18  TV (machine): 350  FiO2: 100  PEEP: 8  ITime: 1  MAP: 18  PIP: 42      ICU Vital Signs Last 24 Hrs  T(C): 37.2 (25 Nov 2022 15:40), Max: 37.2 (25 Nov 2022 15:40)  T(F): 98.9 (25 Nov 2022 15:40), Max: 98.9 (25 Nov 2022 15:40)  HR: 53 (25 Nov 2022 19:00) (48 - 60)  BP: 119/45 (25 Nov 2022 19:00) (79/48 - 132/55)  BP(mean): 68 (25 Nov 2022 19:00) (58 - 118)  ABP: --  ABP(mean): --  RR: 22 (25 Nov 2022 19:00) (13 - 29)  SpO2: 99% (25 Nov 2022 19:00) (91% - 99%)    O2 Parameters below as of 25 Nov 2022 19:00  Patient On (Oxygen Delivery Method): ventilator    O2 Concentration (%): 100      Vital Signs Last 24 Hrs  T(C): 37.2 (25 Nov 2022 15:40), Max: 37.2 (25 Nov 2022 15:40)  T(F): 98.9 (25 Nov 2022 15:40), Max: 98.9 (25 Nov 2022 15:40)  HR: 53 (25 Nov 2022 19:00) (48 - 60)  BP: 119/45 (25 Nov 2022 19:00) (79/48 - 132/55)  BP(mean): 68 (25 Nov 2022 19:00) (58 - 118)  RR: 22 (25 Nov 2022 19:00) (13 - 29)  SpO2: 99% (25 Nov 2022 19:00) (91% - 99%)    Parameters below as of 25 Nov 2022 19:00  Patient On (Oxygen Delivery Method): ventilator    O2 Concentration (%): 100        I&O's Detail    24 Nov 2022 07:01  -  25 Nov 2022 07:00  --------------------------------------------------------  IN:    Enteral Tube Flush: 325 mL    Glucerna 1.5: 800 mL    IV PiggyBack: 16 mL    Norepinephrine: 106.7 mL    sodium chloride 0.9%: 800 mL  Total IN: 2047.7 mL    OUT:    Voided (mL): 1310 mL  Total OUT: 1310 mL    Total NET: 737.7 mL      25 Nov 2022 07:01  -  25 Nov 2022 20:26  --------------------------------------------------------  IN:    Enteral Tube Flush: 225 mL    Glucerna 1.5: 450 mL    Norepinephrine: 100.9 mL  Total IN: 775.9 mL    OUT:    Voided (mL): 700 mL  Total OUT: 700 mL    Total NET: 75.9 mL            LABS:                        7.4    9.01  )-----------( 197      ( 25 Nov 2022 05:54 )             24.7     11-25    131<L>  |  98  |  28<H>  ----------------------------<  125<H>  5.1   |  29  |  1.19    Ca    7.3<L>      25 Nov 2022 05:54  Phos  3.0     11-24  Mg     2.3     11-24            CAPILLARY BLOOD GLUCOSE      POCT Blood Glucose.: 117 mg/dL (25 Nov 2022 17:20)        CULTURES:  Culture Results:   Moderate Acinetobacter baumannii/nosocom group (Carbapenem Resistant)  Normal Respiratory Elvira present (11-21 @ 20:14)  Culture Results:   <10,000 CFU/mL Normal Urogenital Elvira (11-21 @ 04:25)  Culture Results:   No growth to date. (11-21 @ 03:56)  Culture Results:   No growth to date. (11-21 @ 03:56)    ampicillin/sulbactam  IVPB 3 Gram(s) IV Intermittent every 6 hours      Physical Examination:  General: No acute distress.    NEURO: A O x 0 but aorusable, at baseline. Cn2-12 itnact.   HEENT: Pupils equal, reactive to light.  Symmetric.  PULM: CTA BL, no significant sputum production, no wheezes, rales, rhonchi  CVS: Regular rate and rhythm, no murmurs, rubs, or gallops  ABD: Soft, nondistended, nontender, normoactive bowel sounds, no masses  EXT: No edema, nontender  SKIN: Warm and well perfused, no rashes noted      RADIOLOGY:   < from: Xray Chest 1 View- PORTABLE-Urgent (11.21.22 @ 04:03) >  IMPRESSION: Initial CHF and right effusion improved on the latest study.   Right jugular line inserted.    --- End of Report ---    < end of copied text >

## 2022-11-25 NOTE — PROGRESS NOTE ADULT - ASSESSMENT
The patient is a 77 year old female with a history of HTN, DM, CVA, dementia, chronic respiratory failure s/p trach, PEG, cardiac arrest, anemia, pleural effusions who presents with fever and respiratory distress.    Plan:  - Echo 8/22 with normal LV systolic function, mod pulm HTN, IVC small/collapsible  - CXR with diffuse lung infiltrates  - Continue midodrine 10 mg tid  - Anasarca from low albumin  - On norepinephrine  - IV antibiotics  - Pulm and ID follow-up  - Comfort care would be most appropriate

## 2022-11-25 NOTE — PROGRESS NOTE ADULT - SUBJECTIVE AND OBJECTIVE BOX
Covering [facility] DIVISION of INFECTIOUS DISEASE  MOIZ Chun, SOLANGE Higgins G. Casimir PARASHARAMIBREA is a 77yFemale , patient examined and chart reviewed.     INTERVAL HPI/ OVERNIGHT EVENTS:  Vegetative state. Afebrile.  Chronic vent. No events    Past Medical History--  PAST MEDICAL & SURGICAL HISTORY:  Dementia of frontal lobe type  Aphasic stroke  Diabetes mellitus  Respiratory failure  Hypertension  GERD (gastroesophageal reflux disease)  Constipation  Respiratory failure  CVA (cerebral vascular accident)  HTN (hypertension)  Advanced dementia  COVID-19 virus detected  Quadriplegia  Pneumoni  Type II diabetes mellitus  Hx of appendectomy  Gastrostomy in place  Tracheostomy in place        For details regarding the patient's social history, family history, and other miscellaneous elements, please refer the initial infectious diseases consultation and/or the admitting history and physical examination for this admission.      ROS:  Unable to obtain due to : pt's condition      ALLERGIES  codeine (Hives)      Current inpatient medications :    ANTIBIOTICS/RELEVANT:  ampicillin/sulbactam  IVPB 3 Gram(s) IV Intermittent every 6 hours    MEDICATIONS  (STANDING):  albuterol    90 MICROgram(s) HFA Inhaler 2 Puff(s) Inhalation two times a day  chlorhexidine 2% Cloths 1 Application(s) Topical daily  dextrose 5%. 1000 milliLiter(s) (50 mL/Hr) IV Continuous <Continuous>  dextrose 5%. 1000 milliLiter(s) (100 mL/Hr) IV Continuous <Continuous>  dextrose 50% Injectable 25 Gram(s) IV Push once  dextrose 50% Injectable 12.5 Gram(s) IV Push once  dextrose 50% Injectable 25 Gram(s) IV Push once  glucagon  Injectable 1 milliGRAM(s) IntraMuscular once  heparin   Injectable 5000 Unit(s) SubCutaneous every 12 hours  insulin glargine Injectable (LANTUS) 8 Unit(s) SubCutaneous every morning  insulin lispro (ADMELOG) corrective regimen sliding scale   SubCutaneous every 6 hours  LORazepam     Tablet 1 milliGRAM(s) Oral every 4 hours  midodrine 15 milliGRAM(s) Oral every 8 hours  norepinephrine Infusion 0.05 MICROgram(s)/kG/Min (5.35 mL/Hr) IV Continuous <Continuous>  pantoprazole  Injectable 40 milliGRAM(s) IV Push daily  polyethylene glycol 3350 17 Gram(s) Oral every 12 hours  povidone iodine 10% Solution 1 Application(s) Topical daily  senna 2 Tablet(s) Oral at bedtime  sodium chloride 0.9%. 1000 milliLiter(s) (100 mL/Hr) IV Continuous <Continuous>    MEDICATIONS  (PRN):  dextrose Oral Gel 15 Gram(s) Oral once PRN Blood Glucose LESS THAN 70 milliGRAM(s)/deciliter  LORazepam   Injectable 2 milliGRAM(s) IV Push every 6 hours PRN Agitation  sodium chloride 0.9% lock flush 10 milliLiter(s) IV Push every 1 hour PRN Pre/post blood products, medications, blood draw, and to maintain line patency      Objective:  ICU Vital Signs Last 24 Hrs  T(C): 37.2 (25 Nov 2022 15:40), Max: 37.2 (25 Nov 2022 15:40)  T(F): 98.9 (25 Nov 2022 15:40), Max: 98.9 (25 Nov 2022 15:40)  HR: 77 (25 Nov 2022 20:41) (48 - 79)  BP: 119/45 (25 Nov 2022 19:00) (79/48 - 132/55)  BP(mean): 68 (25 Nov 2022 19:00) (58 - 118)  RR: 22 (25 Nov 2022 19:00) (13 - 29)  SpO2: 100% (25 Nov 2022 20:41) (91% - 100%)    O2 Parameters below as of 25 Nov 2022 20:41  Patient On (Oxygen Delivery Method): ventilator,100%      Physical Exam:  GEN: Chronic vent vegetative state  HEENT: normocephalic and atraumatic.   NECK: Supple.   LUNGS: Decreased  to auscultation.  HEART: Regular rate and rhythm without murmur.  ABDOMEN: Soft, nontender, and nondistended.  Positive bowel sounds.  +G tube  EXTREMITIES: + edema.  NEUROLOGIC: Unresponsive      LABS:                        7.4    9.01  )-----------( 197      ( 25 Nov 2022 05:54 )             24.7   11-25    131<L>  |  98  |  28<H>  ----------------------------<  125<H>  5.1   |  29  |  1.19    Ca    7.3<L>      25 Nov 2022 05:54  Phos  3.0     11-24  Mg     2.3     11-24        MICROBIOLOGY:  Culture - Sputum . (11.21.22 @ 20:14)    Gram Stain:   Moderate polymorphonuclear leukocytes per low power field  No Squamous epithelial cells per low power field  No organisms seen    -  Amikacin: S <=16    -  Ampicillin/Sulbactam: S 8/4    -  Cefepime: R >16    -  Ceftazidime: R >16    -  Ciprofloxacin: R >2    -  Gentamicin: I 8    -  Imipenem: R 8    -  Levofloxacin: R >4    -  Meropenem: R >8    -  Piperacillin/Tazobactam: R    -  Polymyxin B: I 0.25    -  Tobramycin: R >8    -  Trimethoprim/Sulfamethoxazole: R >2/38    -  Minocycline: R    Specimen Source: Trach Asp Tracheal Aspirate    Culture Results:   Moderate Acinetobacter baumannii/nosocom group (Carbapenem Resistant)  Normal Respiratory Elvira present    Organism Identification: Acinetobacter baumannii/nosocom group (Carbapenem Resistant)    Organism: Acinetobacter baumannii/nosocom group (Carbapenem Resistant)    Organism: Acinetobacter baumannii/nosocom group (Carbapenem Resistant)    Organism: Acinetobacter baumannii/nosocom group (Carbapenem Resistant)    Method Type: ETEST    Method Type: KB    Method Type: PRATIK          RADIOLOGY & ADDITIONAL STUDIES:    ACC: [id number] EXAM:  XR CHEST PORTABLE URGENT 1V                        ACC: [id number] EXAM:  XR CHEST PORTABLE URGENT 1V                          PROCEDURE DATE:  11/21/2022          INTERPRETATION:  AP chest on November 21, 2022 at 3:52 AM. Patient has   sepsis.    Heart magnified by technique. Tracheostomy remains.    Moderate to large right effusion somewhat increased from November 12.    Central infiltrates are again noted possibly congestive.    Follow-up AP chest on November 21, 2022 at 1:03 PM.    Right jugular line is been inserted. Congestion somewhat improved   effusions diminished.    Patient's hand obscures left base.    IMPRESSION: Initial CHF and right effusion improved on the latest study.   Right jugular line inserted.      Assessment :  77F with dementia, COPD with chronic respiratory failure s/p trach, cardiac arrest, CHH, quadriplegia, CVA, CKD, anemia, PEG tube, and multiple hospital admissions for respiratory distress presents to the ED BIBEMS from [facility] for diaphoresis and fever.     Patient was just discharged here from 11/12-11/16 for low grade fever, midline malfunction, leukocytosis, fever, concerning for sepsis possibly 2/2 recurent VAP.    Sputum cx 11/21 with Acinetobacter baumannii/nosocom group (Carbapenem Resistant) resistant to Bactrim  WBC better    Plan:  Cont Unasyn x 7 days  Trend temps and cbc  Asp precautions  Aggressive pulm toileting  Isolation per infection control policy  Vent management per ICU  Poor prognosis  Continued GOC      Continue with present regiment.  Appropriate use of antibiotics and adverse effects reviewed.      Critical care time greater then 35 minutes reviewing notes, labs data/ imaging , discussion with multidisciplinary team.    Thank you for allowing me to participate in care of your patient .        Eusebio Chairez MD  Infectious Disease  [phone]

## 2022-11-25 NOTE — PROGRESS NOTE ADULT - SUBJECTIVE AND OBJECTIVE BOX
Chief Complaint: Respiratory distress    Interval Events: No events overnight.    Review of Systems:  Unable to obtain    Physical Exam:  Vital Signs Last 24 Hrs  T(C): 36.9 (25 Nov 2022 08:43), Max: 36.9 (25 Nov 2022 08:43)  T(F): 98.5 (25 Nov 2022 08:43), Max: 98.5 (25 Nov 2022 08:43)  HR: 55 (25 Nov 2022 09:00) (49 - 76)  BP: 118/53 (25 Nov 2022 09:00) (84/45 - 132/55)  BP(mean): 72 (25 Nov 2022 09:00) (59 - 118)  RR: 19 (25 Nov 2022 09:00) (13 - 34)  SpO2: 95% (25 Nov 2022 09:00) (89% - 100%)  Parameters below as of 25 Nov 2022 09:00  Patient On (Oxygen Delivery Method): ventilator  O2 Concentration (%): 100  General: Unresponsive  HEENT: MMM  Neck: No JVD, no carotid bruit  Lungs: CTAB  CV: RRR, nl S1/S2, no M/R/G  Abdomen: S/NT/ND, +BS  Extremities: 1+ LE edema, no cyanosis  Neuro: AAOx0  Skin: No rash    Labs:    11-25    131<L>  |  98  |  28<H>  ----------------------------<  125<H>  5.1   |  29  |  1.19    Ca    7.3<L>      25 Nov 2022 05:54  Phos  3.0     11-24  Mg     2.3     11-24                          7.4    9.01  )-----------( 197      ( 25 Nov 2022 05:54 )             24.7       ECG/Telemetry: Sinus rhythm

## 2022-11-25 NOTE — PROGRESS NOTE ADULT - SUBJECTIVE AND OBJECTIVE BOX
Date/Time Patient Seen:  		  Referring MD:   Data Reviewed	       Patient is a 77y old  Female who presents with a chief complaint of diaphoresis and fever (24 Nov 2022 21:00)      Subjective/HPI     PAST MEDICAL & SURGICAL HISTORY:  Dementia of frontal lobe type    Aphasic stroke    Diabetes mellitus    Respiratory failure    Hypertension    GERD (gastroesophageal reflux disease)    Constipation    Respiratory failure    CVA (cerebral vascular accident)    HTN (hypertension)    DM (diabetes mellitus)    Advanced dementia    COVID-19 virus detected    Quadriplegia    Pneumonia    Type II diabetes mellitus    Hx of appendectomy    Gastrostomy in place    Tracheostomy in place    Tracheostomy tube present    Feeding by G-tube          Medication list         MEDICATIONS  (STANDING):  albuterol    90 MICROgram(s) HFA Inhaler 2 Puff(s) Inhalation two times a day  ampicillin/sulbactam  IVPB 3 Gram(s) IV Intermittent every 6 hours  chlorhexidine 2% Cloths 1 Application(s) Topical daily  dextrose 5%. 1000 milliLiter(s) (50 mL/Hr) IV Continuous <Continuous>  dextrose 5%. 1000 milliLiter(s) (100 mL/Hr) IV Continuous <Continuous>  dextrose 50% Injectable 25 Gram(s) IV Push once  dextrose 50% Injectable 12.5 Gram(s) IV Push once  dextrose 50% Injectable 25 Gram(s) IV Push once  glucagon  Injectable 1 milliGRAM(s) IntraMuscular once  heparin   Injectable 5000 Unit(s) SubCutaneous every 12 hours  insulin glargine Injectable (LANTUS) 8 Unit(s) SubCutaneous every morning  insulin lispro (ADMELOG) corrective regimen sliding scale   SubCutaneous every 6 hours  LORazepam     Tablet 1 milliGRAM(s) Oral every 4 hours  midodrine 15 milliGRAM(s) Oral every 8 hours  norepinephrine Infusion 0.05 MICROgram(s)/kG/Min (5.35 mL/Hr) IV Continuous <Continuous>  pantoprazole  Injectable 40 milliGRAM(s) IV Push daily  polyethylene glycol 3350 17 Gram(s) Oral every 12 hours  povidone iodine 10% Solution 1 Application(s) Topical daily  senna 2 Tablet(s) Oral at bedtime  sodium chloride 0.9%. 1000 milliLiter(s) (100 mL/Hr) IV Continuous <Continuous>    MEDICATIONS  (PRN):  dextrose Oral Gel 15 Gram(s) Oral once PRN Blood Glucose LESS THAN 70 milliGRAM(s)/deciliter  LORazepam   Injectable 2 milliGRAM(s) IV Push every 6 hours PRN Agitation  sodium chloride 0.9% lock flush 10 milliLiter(s) IV Push every 1 hour PRN Pre/post blood products, medications, blood draw, and to maintain line patency         Vitals log        ICU Vital Signs Last 24 Hrs  T(C): 36.7 (25 Nov 2022 04:00), Max: 37.1 (24 Nov 2022 08:21)  T(F): 98 (25 Nov 2022 04:00), Max: 98.7 (24 Nov 2022 08:21)  HR: 59 (25 Nov 2022 05:33) (49 - 112)  BP: 110/57 (25 Nov 2022 04:00) (69/39 - 215/62)  BP(mean): 72 (25 Nov 2022 04:00) (51 - 149)  ABP: --  ABP(mean): --  RR: 20 (25 Nov 2022 04:00) (7 - 35)  SpO2: 95% (25 Nov 2022 05:33) (83% - 100%)    O2 Parameters below as of 24 Nov 2022 20:00  Patient On (Oxygen Delivery Method): ventilator             Mode: AC/ CMV (Assist Control/ Continuous Mandatory Ventilation)  RR (machine): 18  TV (machine): 350  FiO2: 100  PEEP: 8  ITime: 1  MAP: 17  PIP: 39      Input and Output:  I&O's Detail    23 Nov 2022 07:01  -  24 Nov 2022 07:00  --------------------------------------------------------  IN:    Enteral Tube Flush: 25 mL    Glucerna 1.5: 1080 mL    IV PiggyBack: 500 mL    Norepinephrine: 65.6 mL  Total IN: 1670.6 mL    OUT:    Voided (mL): 450 mL  Total OUT: 450 mL    Total NET: 1220.6 mL      24 Nov 2022 07:01  -  25 Nov 2022 06:09  --------------------------------------------------------  IN:    Enteral Tube Flush: 300 mL    Glucerna 1.5: 750 mL    IV PiggyBack: 16 mL    Norepinephrine: 99.3 mL    sodium chloride 0.9%: 800 mL  Total IN: 1965.3 mL    OUT:    Voided (mL): 560 mL  Total OUT: 560 mL    Total NET: 1405.3 mL          Lab Data                        7.4    9.01  )-----------( 197      ( 25 Nov 2022 05:54 )             24.7     11-24    130<L>  |  97  |  28<H>  ----------------------------<  115<H>  4.8   |  28  |  1.13    Ca    7.5<L>      24 Nov 2022 05:30  Phos  3.0     11-24  Mg     2.3     11-24              Review of Systems	      Objective     Physical Examination  heart s1s2  lung dec BS  head nc        Pertinent Lab findings & Imaging      Annalise:  NO   Adequate UO     I&O's Detail    23 Nov 2022 07:01  -  24 Nov 2022 07:00  --------------------------------------------------------  IN:    Enteral Tube Flush: 25 mL    Glucerna 1.5: 1080 mL    IV PiggyBack: 500 mL    Norepinephrine: 65.6 mL  Total IN: 1670.6 mL    OUT:    Voided (mL): 450 mL  Total OUT: 450 mL    Total NET: 1220.6 mL      24 Nov 2022 07:01  -  25 Nov 2022 06:09  --------------------------------------------------------  IN:    Enteral Tube Flush: 300 mL    Glucerna 1.5: 750 mL    IV PiggyBack: 16 mL    Norepinephrine: 99.3 mL    sodium chloride 0.9%: 800 mL  Total IN: 1965.3 mL    OUT:    Voided (mL): 560 mL  Total OUT: 560 mL    Total NET: 1405.3 mL               Discussed with:     Cultures:	        Radiology

## 2022-11-25 NOTE — PROGRESS NOTE ADULT - ASSESSMENT
77F with dementia, COPD with chronic respiratory failure s/p trach, cardiac arrest, CHH, quadriplegia, CVA, CKD, anemia, PEG tube, and multiple hospital admissions for respiratory distress presents to the ED BIBEMS from [facility] for diaphoresis and fever.       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

## 2022-11-25 NOTE — PROGRESS NOTE ADULT - ASSESSMENT
Assessment   78 y/o Full code F with pmhx of dementia, COPD with chronic respiratory failure, S/p Trach and peg ( Vent Dependent), Cardiac arrest, quadriplegia, CVA, CKD, Anemia, and multiple admissions to hospital ( twice in past 6 weeks) including being discharged on 11/16 with VAP presents to ED from [facility] with fevers and abnormal labs. Elevated WBC with left shift, febrile, CXR shows worsening R lung infiltrates, appears to have septic shock and worsening VAP. Tracheal aspirate showed Acinetobacter baumannii that is resistant to bactrim, penams now on unasyn. Remains vented. On low dose levo. Remains in [facility] for active treatment of    1. Septic Shock  2. PNA  3. Acute on chronic hypoxic RF    Plan   -Ativan prn and standing  - Albuterol BID. Remains on vent activley titrating Fio2 to maintain Spo2 > 92 %  - On low dose levo, titrate to maintain map > 65. Midodrine 10 mg TID  - Bowel regimen. IV PPI porhlyaxis  - Trend Scr, near baseline, Avoid nephrotoxic agents  - Hep Sub Q for DVT prophylaxis  - Tracheal aspirate as Acinetobacter Baumannii resistant to bactrim penam, on zosyn. ID consult apprecaited.   - Sanding insulin and ISS. Goal -180

## 2022-11-26 LAB
ALBUMIN SERPL ELPH-MCNC: 1.7 G/DL — LOW (ref 3.3–5)
ALP SERPL-CCNC: 142 U/L — HIGH (ref 30–120)
ALT FLD-CCNC: 17 U/L DA — SIGNIFICANT CHANGE UP (ref 10–60)
ANION GAP SERPL CALC-SCNC: 5 MMOL/L — SIGNIFICANT CHANGE UP (ref 5–17)
AST SERPL-CCNC: 26 U/L — SIGNIFICANT CHANGE UP (ref 10–40)
BILIRUB SERPL-MCNC: 0.2 MG/DL — SIGNIFICANT CHANGE UP (ref 0.2–1.2)
BUN SERPL-MCNC: 27 MG/DL — HIGH (ref 7–23)
CALCIUM SERPL-MCNC: 8 MG/DL — LOW (ref 8.4–10.5)
CHLORIDE SERPL-SCNC: 100 MMOL/L — SIGNIFICANT CHANGE UP (ref 96–108)
CO2 SERPL-SCNC: 30 MMOL/L — SIGNIFICANT CHANGE UP (ref 22–31)
CREAT SERPL-MCNC: 1.26 MG/DL — SIGNIFICANT CHANGE UP (ref 0.5–1.3)
CULTURE RESULTS: SIGNIFICANT CHANGE UP
CULTURE RESULTS: SIGNIFICANT CHANGE UP
EGFR: 43 ML/MIN/1.73M2 — LOW
GLUCOSE SERPL-MCNC: 161 MG/DL — HIGH (ref 70–99)
HCT VFR BLD CALC: 24.9 % — LOW (ref 34.5–45)
HGB BLD-MCNC: 7.5 G/DL — LOW (ref 11.5–15.5)
INR BLD: 1.21 RATIO — HIGH (ref 0.88–1.16)
LACTATE SERPL-SCNC: 1.1 MMOL/L — SIGNIFICANT CHANGE UP (ref 0.7–2)
MCHC RBC-ENTMCNC: 27 PG — SIGNIFICANT CHANGE UP (ref 27–34)
MCHC RBC-ENTMCNC: 30.1 GM/DL — LOW (ref 32–36)
MCV RBC AUTO: 89.6 FL — SIGNIFICANT CHANGE UP (ref 80–100)
NRBC # BLD: 0 /100 WBCS — SIGNIFICANT CHANGE UP (ref 0–0)
PHOSPHATE SERPL-MCNC: 2.9 MG/DL — SIGNIFICANT CHANGE UP (ref 2.5–4.5)
PLATELET # BLD AUTO: 193 K/UL — SIGNIFICANT CHANGE UP (ref 150–400)
POTASSIUM SERPL-MCNC: 5.7 MMOL/L — HIGH (ref 3.5–5.3)
POTASSIUM SERPL-SCNC: 5.7 MMOL/L — HIGH (ref 3.5–5.3)
PROCALCITONIN SERPL-MCNC: 0.67 NG/ML — HIGH (ref 0.02–0.1)
PROT SERPL-MCNC: 6.8 G/DL — SIGNIFICANT CHANGE UP (ref 6–8.3)
PROTHROM AB SERPL-ACNC: 14 SEC — HIGH (ref 10.5–13.4)
RBC # BLD: 2.78 M/UL — LOW (ref 3.8–5.2)
RBC # FLD: 17.5 % — HIGH (ref 10.3–14.5)
SODIUM SERPL-SCNC: 135 MMOL/L — SIGNIFICANT CHANGE UP (ref 135–145)
SPECIMEN SOURCE: SIGNIFICANT CHANGE UP
SPECIMEN SOURCE: SIGNIFICANT CHANGE UP
WBC # BLD: 9.41 K/UL — SIGNIFICANT CHANGE UP (ref 3.8–10.5)
WBC # FLD AUTO: 9.41 K/UL — SIGNIFICANT CHANGE UP (ref 3.8–10.5)

## 2022-11-26 PROCEDURE — 71045 X-RAY EXAM CHEST 1 VIEW: CPT | Mod: 26

## 2022-11-26 PROCEDURE — 93970 EXTREMITY STUDY: CPT | Mod: 26

## 2022-11-26 PROCEDURE — 99233 SBSQ HOSP IP/OBS HIGH 50: CPT

## 2022-11-26 RX ORDER — DEXMEDETOMIDINE HYDROCHLORIDE IN 0.9% SODIUM CHLORIDE 4 UG/ML
0.5 INJECTION INTRAVENOUS
Qty: 200 | Refills: 0 | Status: DISCONTINUED | OUTPATIENT
Start: 2022-11-26 | End: 2022-11-29

## 2022-11-26 RX ORDER — SODIUM ZIRCONIUM CYCLOSILICATE 10 G/10G
5 POWDER, FOR SUSPENSION ORAL ONCE
Refills: 0 | Status: COMPLETED | OUTPATIENT
Start: 2022-11-26 | End: 2022-11-26

## 2022-11-26 RX ADMIN — HEPARIN SODIUM 5000 UNIT(S): 5000 INJECTION INTRAVENOUS; SUBCUTANEOUS at 18:00

## 2022-11-26 RX ADMIN — Medication 1 APPLICATION(S): at 11:21

## 2022-11-26 RX ADMIN — Medication 5.35 MICROGRAM(S)/KG/MIN: at 00:41

## 2022-11-26 RX ADMIN — SENNA PLUS 2 TABLET(S): 8.6 TABLET ORAL at 21:05

## 2022-11-26 RX ADMIN — Medication 1 MILLIGRAM(S): at 14:08

## 2022-11-26 RX ADMIN — CHLORHEXIDINE GLUCONATE 1 APPLICATION(S): 213 SOLUTION TOPICAL at 11:21

## 2022-11-26 RX ADMIN — Medication 1 MILLIGRAM(S): at 18:00

## 2022-11-26 RX ADMIN — MIDODRINE HYDROCHLORIDE 15 MILLIGRAM(S): 2.5 TABLET ORAL at 05:48

## 2022-11-26 RX ADMIN — Medication 2: at 23:39

## 2022-11-26 RX ADMIN — Medication 1 MILLIGRAM(S): at 09:56

## 2022-11-26 RX ADMIN — AMPICILLIN SODIUM AND SULBACTAM SODIUM 200 GRAM(S): 250; 125 INJECTION, POWDER, FOR SUSPENSION INTRAMUSCULAR; INTRAVENOUS at 05:47

## 2022-11-26 RX ADMIN — Medication 1 MILLIGRAM(S): at 21:05

## 2022-11-26 RX ADMIN — Medication 2 MILLIGRAM(S): at 22:28

## 2022-11-26 RX ADMIN — INSULIN GLARGINE 8 UNIT(S): 100 INJECTION, SOLUTION SUBCUTANEOUS at 07:59

## 2022-11-26 RX ADMIN — MIDODRINE HYDROCHLORIDE 15 MILLIGRAM(S): 2.5 TABLET ORAL at 14:08

## 2022-11-26 RX ADMIN — HEPARIN SODIUM 5000 UNIT(S): 5000 INJECTION INTRAVENOUS; SUBCUTANEOUS at 05:48

## 2022-11-26 RX ADMIN — Medication 1 MILLIGRAM(S): at 01:40

## 2022-11-26 RX ADMIN — MIDODRINE HYDROCHLORIDE 15 MILLIGRAM(S): 2.5 TABLET ORAL at 21:05

## 2022-11-26 RX ADMIN — Medication 1 MILLIGRAM(S): at 05:48

## 2022-11-26 RX ADMIN — Medication 1 MILLIGRAM(S): at 23:53

## 2022-11-26 RX ADMIN — AMPICILLIN SODIUM AND SULBACTAM SODIUM 200 GRAM(S): 250; 125 INJECTION, POWDER, FOR SUSPENSION INTRAMUSCULAR; INTRAVENOUS at 18:01

## 2022-11-26 RX ADMIN — POLYETHYLENE GLYCOL 3350 17 GRAM(S): 17 POWDER, FOR SOLUTION ORAL at 05:48

## 2022-11-26 RX ADMIN — SODIUM ZIRCONIUM CYCLOSILICATE 5 GRAM(S): 10 POWDER, FOR SUSPENSION ORAL at 12:55

## 2022-11-26 RX ADMIN — ALBUTEROL 2 PUFF(S): 90 AEROSOL, METERED ORAL at 08:37

## 2022-11-26 RX ADMIN — PANTOPRAZOLE SODIUM 40 MILLIGRAM(S): 20 TABLET, DELAYED RELEASE ORAL at 11:21

## 2022-11-26 RX ADMIN — POLYETHYLENE GLYCOL 3350 17 GRAM(S): 17 POWDER, FOR SOLUTION ORAL at 18:00

## 2022-11-26 RX ADMIN — DEXMEDETOMIDINE HYDROCHLORIDE IN 0.9% SODIUM CHLORIDE 7.14 MICROGRAM(S)/KG/HR: 4 INJECTION INTRAVENOUS at 23:30

## 2022-11-26 RX ADMIN — Medication 2 MILLIGRAM(S): at 16:26

## 2022-11-26 RX ADMIN — AMPICILLIN SODIUM AND SULBACTAM SODIUM 200 GRAM(S): 250; 125 INJECTION, POWDER, FOR SUSPENSION INTRAMUSCULAR; INTRAVENOUS at 11:21

## 2022-11-26 RX ADMIN — ALBUTEROL 2 PUFF(S): 90 AEROSOL, METERED ORAL at 20:33

## 2022-11-26 NOTE — PROGRESS NOTE ADULT - SUBJECTIVE AND OBJECTIVE BOX
Patient is a 77y old  Female who presents with a chief complaint of diaphoresis and fever (26 Nov 2022 11:22)        HPI:  77F with dementia, COPD with chronic respiratory failure s/p trach, cardiac arrest, CHH, quadriplegia, CVA, CKD, anemia, PEG tube, and multiple hospital admissions for respiratory distress presents to the ED BIBEMS from [facility] for diaphoresis and fever.   Patient unable to provide any history.  Patient was just discharged here from 11/12-11/16 for low grade fever, midline malfunction, leukocytosis, fever, concerning for sepsis possibly 2/2 unresolved VAP.  Was treated with meropenem.  Per notes from [facility], around 3am, patient was does "not look good" and was noted to be diaphoretic, tachypneic, and febrile to 100.9F.  Was sent here for further evaluation.  In the ED, patient was febrile to 101.5F and tachycardic to 112.  Labs showed leukocytosis of 16 (up from 6.5), hyperkalemia 5.4, ABG 7.59/36/273.  Patient is being readmitted for sepsis 2/2 presumably from VAP.   (21 Nov 2022 04:55)      SUBJECTIVE & OBJECTIVE: Pt seen and examined at bedside. nad    PHYSICAL EXAM:  T(C): 36 (11-26-22 @ 11:00), Max: 37.2 (11-25-22 @ 15:40)  HR: 53 (11-26-22 @ 11:30) (48 - 79)  BP: 113/52 (11-26-22 @ 11:02) (79/48 - 142/43)  RR: 20 (11-26-22 @ 11:02) (11 - 29)  SpO2: 99% (11-26-22 @ 11:30) (94% - 100%)  Wt(kg): --   GENERAL:vent   NECK: Supple, No JVD  NERVOUS SYSTEM:  Alert   CHEST/LUNG: trach/vent   HEART: Regular rate and rhythm; No murmurs, rubs, or gallops  ABDOMEN: peg tube +   EXTREMITIES:  2+ Peripheral Pulses, No clubbing, cyanosis, or edema        MEDICATIONS  (STANDING):  albuterol    90 MICROgram(s) HFA Inhaler 2 Puff(s) Inhalation two times a day  ampicillin/sulbactam  IVPB 3 Gram(s) IV Intermittent every 6 hours  chlorhexidine 2% Cloths 1 Application(s) Topical daily  dextrose 5%. 1000 milliLiter(s) (50 mL/Hr) IV Continuous <Continuous>  dextrose 5%. 1000 milliLiter(s) (100 mL/Hr) IV Continuous <Continuous>  dextrose 50% Injectable 25 Gram(s) IV Push once  dextrose 50% Injectable 12.5 Gram(s) IV Push once  dextrose 50% Injectable 25 Gram(s) IV Push once  glucagon  Injectable 1 milliGRAM(s) IntraMuscular once  heparin   Injectable 5000 Unit(s) SubCutaneous every 12 hours  insulin glargine Injectable (LANTUS) 8 Unit(s) SubCutaneous every morning  insulin lispro (ADMELOG) corrective regimen sliding scale   SubCutaneous every 6 hours  LORazepam     Tablet 1 milliGRAM(s) Oral every 4 hours  midodrine 15 milliGRAM(s) Oral every 8 hours  norepinephrine Infusion 0.05 MICROgram(s)/kG/Min (5.35 mL/Hr) IV Continuous <Continuous>  pantoprazole  Injectable 40 milliGRAM(s) IV Push daily  polyethylene glycol 3350 17 Gram(s) Oral every 12 hours  povidone iodine 10% Solution 1 Application(s) Topical daily  senna 2 Tablet(s) Oral at bedtime  sodium chloride 0.9%. 1000 milliLiter(s) (100 mL/Hr) IV Continuous <Continuous>    MEDICATIONS  (PRN):  dextrose Oral Gel 15 Gram(s) Oral once PRN Blood Glucose LESS THAN 70 milliGRAM(s)/deciliter  LORazepam   Injectable 2 milliGRAM(s) IV Push every 6 hours PRN Agitation  sodium chloride 0.9% lock flush 10 milliLiter(s) IV Push every 1 hour PRN Pre/post blood products, medications, blood draw, and to maintain line patency      LABS:                        7.5    9.41  )-----------( 193      ( 26 Nov 2022 06:09 )             24.9     11-26    135  |  100  |  27<H>  ----------------------------<  161<H>  5.7<H>   |  30  |  1.26    Ca    8.0<L>      26 Nov 2022 06:09  Phos  2.9     11-26    TPro  6.8  /  Alb  1.7<L>  /  TBili  0.2  /  DBili  x   /  AST  26  /  ALT  17  /  AlkPhos  142<H>  11-26    PT/INR - ( 26 Nov 2022 06:09 )   PT: 14.0 sec;   INR: 1.21 ratio               CAPILLARY BLOOD GLUCOSE      POCT Blood Glucose.: 152 mg/dL (26 Nov 2022 11:32)  POCT Blood Glucose.: 155 mg/dL (26 Nov 2022 07:49)  POCT Blood Glucose.: 148 mg/dL (26 Nov 2022 05:46)  POCT Blood Glucose.: 146 mg/dL (25 Nov 2022 23:37)  POCT Blood Glucose.: 117 mg/dL (25 Nov 2022 17:20)      CAPILLARY BLOOD GLUCOSE      POCT Blood Glucose.: 152 mg/dL (26 Nov 2022 11:32)  POCT Blood Glucose.: 155 mg/dL (26 Nov 2022 07:49)  POCT Blood Glucose.: 148 mg/dL (26 Nov 2022 05:46)  POCT Blood Glucose.: 146 mg/dL (25 Nov 2022 23:37)  POCT Blood Glucose.: 117 mg/dL (25 Nov 2022 17:20)    CAPILLARY BLOOD GLUCOSE      POCT Blood Glucose.: 152 mg/dL (26 Nov 2022 11:32)            RECENT CULTURES:      RADIOLOGY & ADDITIONAL TESTS:                        DVT/GI ppx  Discussed with pt @ bedside

## 2022-11-26 NOTE — PROGRESS NOTE ADULT - SUBJECTIVE AND OBJECTIVE BOX
Chief Complaint: Respiratory distress    Interval Events: No events overnight.    Review of Systems:  Unable to obtain    Physical Exam:  Vital Signs Last 24 Hrs  T(C): 36.2 (26 Nov 2022 08:00), Max: 37.2 (25 Nov 2022 15:40)  T(F): 97.2 (26 Nov 2022 08:00), Max: 98.9 (25 Nov 2022 15:40)  HR: 75 (26 Nov 2022 08:55) (48 - 79)  BP: 142/43 (26 Nov 2022 08:00) (79/48 - 142/43)  BP(mean): 72 (26 Nov 2022 08:00) (58 - 94)  RR: 23 (26 Nov 2022 08:00) (11 - 29)  SpO2: 99% (26 Nov 2022 08:55) (91% - 100%)  Parameters below as of 26 Nov 2022 08:00  Patient On (Oxygen Delivery Method): ventilator  O2 Concentration (%): 100  General: Unresponsive  HEENT: MMM  Neck: No JVD, no carotid bruit  Lungs: CTAB  CV: RRR, nl S1/S2, no M/R/G  Abdomen: S/NT/ND, +BS  Extremities: 1+ LE edema, no cyanosis  Neuro: AAOx0  Skin: No rash    Labs:    11-26    135  |  100  |  27<H>  ----------------------------<  161<H>  5.7<H>   |  30  |  1.26    Ca    8.0<L>      26 Nov 2022 06:09  Phos  2.9     11-26    TPro  6.8  /  Alb  1.7<L>  /  TBili  0.2  /  DBili  x   /  AST  26  /  ALT  17  /  AlkPhos  142<H>  11-26                        7.5    9.41  )-----------( 193      ( 26 Nov 2022 06:09 )             24.9       ECG/Telemetry: Sinus rhythm

## 2022-11-26 NOTE — PROGRESS NOTE ADULT - SUBJECTIVE AND OBJECTIVE BOX
Covering [facility] DIVISION of INFECTIOUS DISEASE  MOIZ Chun, SOLANGE Higgins G. Casimir PARASHARAMIBREA is a 77yFemale , patient examined and chart reviewed.     INTERVAL HPI/ OVERNIGHT EVENTS:  Vegetative state. Afebrile.  Chronic vent. No events  On pressors.    Past Medical History--  PAST MEDICAL & SURGICAL HISTORY:  Dementia of frontal lobe type  Aphasic stroke  Diabetes mellitus  Respiratory failure  Hypertension  GERD (gastroesophageal reflux disease)  Constipation  Respiratory failure  CVA (cerebral vascular accident)  HTN (hypertension)  Advanced dementia  COVID-19 virus detected  Quadriplegia  Pneumoni  Type II diabetes mellitus  Hx of appendectomy  Gastrostomy in place  Tracheostomy in place        For details regarding the patient's social history, family history, and other miscellaneous elements, please refer the initial infectious diseases consultation and/or the admitting history and physical examination for this admission.      ROS:  Unable to obtain due to : pt's condition      ALLERGIES  codeine (Hives)      Current inpatient medications :    ANTIBIOTICS/RELEVANT:  ampicillin/sulbactam  IVPB 3 Gram(s) IV Intermittent every 6 hours    MEDICATIONS  (STANDING):  albuterol    90 MICROgram(s) HFA Inhaler 2 Puff(s) Inhalation two times a day  chlorhexidine 2% Cloths 1 Application(s) Topical daily  dextrose 5%. 1000 milliLiter(s) (50 mL/Hr) IV Continuous <Continuous>  dextrose 5%. 1000 milliLiter(s) (100 mL/Hr) IV Continuous <Continuous>  dextrose 50% Injectable 25 Gram(s) IV Push once  dextrose 50% Injectable 12.5 Gram(s) IV Push once  dextrose 50% Injectable 25 Gram(s) IV Push once  glucagon  Injectable 1 milliGRAM(s) IntraMuscular once  heparin   Injectable 5000 Unit(s) SubCutaneous every 12 hours  insulin glargine Injectable (LANTUS) 8 Unit(s) SubCutaneous every morning  insulin lispro (ADMELOG) corrective regimen sliding scale   SubCutaneous every 6 hours  LORazepam     Tablet 1 milliGRAM(s) Oral every 4 hours  midodrine 15 milliGRAM(s) Oral every 8 hours  norepinephrine Infusion 0.05 MICROgram(s)/kG/Min (5.35 mL/Hr) IV Continuous <Continuous>  pantoprazole  Injectable 40 milliGRAM(s) IV Push daily  polyethylene glycol 3350 17 Gram(s) Oral every 12 hours  povidone iodine 10% Solution 1 Application(s) Topical daily  senna 2 Tablet(s) Oral at bedtime  sodium chloride 0.9%. 1000 milliLiter(s) (100 mL/Hr) IV Continuous <Continuous>    MEDICATIONS  (PRN):  dextrose Oral Gel 15 Gram(s) Oral once PRN Blood Glucose LESS THAN 70 milliGRAM(s)/deciliter  LORazepam   Injectable 2 milliGRAM(s) IV Push every 6 hours PRN Agitation  sodium chloride 0.9% lock flush 10 milliLiter(s) IV Push every 1 hour PRN Pre/post blood products, medications, blood draw, and to maintain line patency      Objective:  ICU Vital Signs Last 24 Hrs  T(C): 36 (26 Nov 2022 11:00), Max: 36.7 (26 Nov 2022 04:46)  T(F): 96.8 (26 Nov 2022 11:00), Max: 98 (26 Nov 2022 04:46)  HR: 118 (26 Nov 2022 23:10) (50 - 123)  BP: 183/166 (26 Nov 2022 23:10) (109/58 - 189/91)  BP(mean): 173 (26 Nov 2022 23:10) (70 - 173)  RR: 28 (26 Nov 2022 23:10) (11 - 41)  SpO2: 100% (26 Nov 2022 23:10) (79% - 100%)    O2 Parameters below as of 26 Nov 2022 21:30  Patient On (Oxygen Delivery Method): ventilator          Physical Exam:  GEN: Chronic vent vegetative state  HEENT: normocephalic and atraumatic.   NECK: Supple.   LUNGS: Decreased  to auscultation.  HEART: Regular rate and rhythm without murmur.  ABDOMEN: Soft, nontender, and nondistended.  Positive bowel sounds.  +G tube  EXTREMITIES: + edema.  NEUROLOGIC: Unresponsive      LABS:                                 7.5    9.41  )-----------( 193      ( 26 Nov 2022 06:09 )             24.9   11-26    135  |  100  |  27<H>  ----------------------------<  161<H>  5.7<H>   |  30  |  1.26    Ca    8.0<L>      26 Nov 2022 06:09  Phos  2.9     11-26    TPro  6.8  /  Alb  1.7<L>  /  TBili  0.2  /  DBili  x   /  AST  26  /  ALT  17  /  AlkPhos  142<H>  11-26      MICROBIOLOGY:  Culture - Sputum . (11.21.22 @ 20:14)    Gram Stain:   Moderate polymorphonuclear leukocytes per low power field  No Squamous epithelial cells per low power field  No organisms seen    -  Amikacin: S <=16    -  Ampicillin/Sulbactam: S 8/4    -  Cefepime: R >16    -  Ceftazidime: R >16    -  Ciprofloxacin: R >2    -  Gentamicin: I 8    -  Imipenem: R 8    -  Levofloxacin: R >4    -  Meropenem: R >8    -  Piperacillin/Tazobactam: R    -  Polymyxin B: I 0.25    -  Tobramycin: R >8    -  Trimethoprim/Sulfamethoxazole: R >2/38    -  Minocycline: R    Specimen Source: Trach Asp Tracheal Aspirate    Culture Results:   Moderate Acinetobacter baumannii/nosocom group (Carbapenem Resistant)  Normal Respiratory Elvira present    Organism Identification: Acinetobacter baumannii/nosocom group (Carbapenem Resistant)    Organism: Acinetobacter baumannii/nosocom group (Carbapenem Resistant)    Organism: Acinetobacter baumannii/nosocom group (Carbapenem Resistant)    Organism: Acinetobacter baumannii/nosocom group (Carbapenem Resistant)    Method Type: ETEST    Method Type: KB    Method Type: PRATIK          RADIOLOGY & ADDITIONAL STUDIES:    ACC: [id number] EXAM:  XR CHEST PORTABLE URGENT 1V                        ACC: [id number] EXAM:  XR CHEST PORTABLE URGENT 1V                          PROCEDURE DATE:  11/21/2022          INTERPRETATION:  AP chest on November 21, 2022 at 3:52 AM. Patient has   sepsis.    Heart magnified by technique. Tracheostomy remains.    Moderate to large right effusion somewhat increased from November 12.    Central infiltrates are again noted possibly congestive.    Follow-up AP chest on November 21, 2022 at 1:03 PM.    Right jugular line is been inserted. Congestion somewhat improved   effusions diminished.    Patient's hand obscures left base.    IMPRESSION: Initial CHF and right effusion improved on the latest study.   Right jugular line inserted.      Assessment :  77F with dementia, COPD with chronic respiratory failure s/p trach, cardiac arrest, CHH, quadriplegia, CVA, CKD, anemia, PEG tube, and multiple hospital admissions for respiratory distress presents to the ED BIBEMS from [facility] for diaphoresis and fever.     Readmitted with sepsis with shock  2/2 recurent VAP.    Sputum cx 11/21 with Acinetobacter baumannii/nosocom group (Carbapenem Resistant) resistant to Bactrim  WBC better  Remains on pressors    Plan:  Cont Unasyn x 7 days  Trend temps and cbc  Asp precautions  Aggressive pulm toileting  Isolation per infection control policy  Vent management per ICU  Wean off pressors as BP permits  Poor prognosis  Continued GOC      Continue with present regiment.  Appropriate use of antibiotics and adverse effects reviewed.      Critical care time greater then 35 minutes reviewing notes, labs data/ imaging , discussion with multidisciplinary team.    Thank you for allowing me to participate in care of your patient .        Eusebio Chairez MD  Infectious Disease  [phone]

## 2022-11-26 NOTE — PROGRESS NOTE ADULT - ASSESSMENT
77F with dementia, COPD with chronic respiratory failure s/p trach, cardiac arrest, CHH, quadriplegia, CVA, CKD, anemia, PEG tube, and multiple hospital admissions for respiratory distress presents to the ED BIBEMS from [facility] for diaphoresis and fever.       Sepsis 2/2 VAP - meets sepsis based on fever + tachycardia + source of infection.  Lactate 1.9  - on Bactrim per ID until 11/27  - cont with vent settings to maintain SaO2 >92%  - cont with midodrine 15mg TID and levo for pressure support   - on ativan for agitation  - MRSA neg, trach culture +acinetobacter   - palliative care consult  wean as tolerating   Cont Unasyn x 7 days  Trend temps and cbc      Hyponatremia  - Na of 130 today, likely hypovolemic  - urine lytes ordered  - continue to trend    Leukocytosis  - WBC WNL  - no fevers  - was on bactrim, changed to unasyn  - continue to trend     DM2 on insulin  - cont with lantus and insulin coverage scale with FS q6h while on TF    COPD   - cont with neb treatments    Anemia of chronic disease   - GI eval - conservative management   - cont with ferrous sulfate  - trend CBC    Prognosis grave, may need ethics eval

## 2022-11-26 NOTE — PROGRESS NOTE ADULT - SUBJECTIVE AND OBJECTIVE BOX
Date/Time Patient Seen:  		  Referring MD:   Data Reviewed	       Patient is a 77y old  Female who presents with a chief complaint of diaphoresis and fever (25 Nov 2022 20:26)      Subjective/HPI     PAST MEDICAL & SURGICAL HISTORY:  Dementia of frontal lobe type    Aphasic stroke    Diabetes mellitus    Respiratory failure    Hypertension    GERD (gastroesophageal reflux disease)    Constipation    Respiratory failure    CVA (cerebral vascular accident)    HTN (hypertension)    DM (diabetes mellitus)    Advanced dementia    COVID-19 virus detected    Quadriplegia    Pneumonia    Type II diabetes mellitus    Hx of appendectomy    Gastrostomy in place    Tracheostomy in place    Tracheostomy tube present    Feeding by G-tube          Medication list         MEDICATIONS  (STANDING):  albuterol    90 MICROgram(s) HFA Inhaler 2 Puff(s) Inhalation two times a day  ampicillin/sulbactam  IVPB 3 Gram(s) IV Intermittent every 6 hours  chlorhexidine 2% Cloths 1 Application(s) Topical daily  dextrose 5%. 1000 milliLiter(s) (50 mL/Hr) IV Continuous <Continuous>  dextrose 5%. 1000 milliLiter(s) (100 mL/Hr) IV Continuous <Continuous>  dextrose 50% Injectable 25 Gram(s) IV Push once  dextrose 50% Injectable 12.5 Gram(s) IV Push once  dextrose 50% Injectable 25 Gram(s) IV Push once  glucagon  Injectable 1 milliGRAM(s) IntraMuscular once  heparin   Injectable 5000 Unit(s) SubCutaneous every 12 hours  insulin glargine Injectable (LANTUS) 8 Unit(s) SubCutaneous every morning  insulin lispro (ADMELOG) corrective regimen sliding scale   SubCutaneous every 6 hours  LORazepam     Tablet 1 milliGRAM(s) Oral every 4 hours  midodrine 15 milliGRAM(s) Oral every 8 hours  norepinephrine Infusion 0.05 MICROgram(s)/kG/Min (5.35 mL/Hr) IV Continuous <Continuous>  pantoprazole  Injectable 40 milliGRAM(s) IV Push daily  polyethylene glycol 3350 17 Gram(s) Oral every 12 hours  povidone iodine 10% Solution 1 Application(s) Topical daily  senna 2 Tablet(s) Oral at bedtime  sodium chloride 0.9%. 1000 milliLiter(s) (100 mL/Hr) IV Continuous <Continuous>    MEDICATIONS  (PRN):  dextrose Oral Gel 15 Gram(s) Oral once PRN Blood Glucose LESS THAN 70 milliGRAM(s)/deciliter  LORazepam   Injectable 2 milliGRAM(s) IV Push every 6 hours PRN Agitation  sodium chloride 0.9% lock flush 10 milliLiter(s) IV Push every 1 hour PRN Pre/post blood products, medications, blood draw, and to maintain line patency         Vitals log        ICU Vital Signs Last 24 Hrs  T(C): 36.7 (26 Nov 2022 04:46), Max: 37.2 (25 Nov 2022 15:40)  T(F): 98 (26 Nov 2022 04:46), Max: 98.9 (25 Nov 2022 15:40)  HR: 54 (26 Nov 2022 06:00) (48 - 79)  BP: 118/56 (26 Nov 2022 06:00) (79/48 - 131/57)  BP(mean): 74 (26 Nov 2022 06:00) (58 - 94)  ABP: --  ABP(mean): --  RR: 20 (26 Nov 2022 06:00) (11 - 29)  SpO2: 100% (26 Nov 2022 06:00) (91% - 100%)    O2 Parameters below as of 26 Nov 2022 06:00  Patient On (Oxygen Delivery Method): ventilator    O2 Concentration (%): 100         Mode: AC/ CMV (Assist Control/ Continuous Mandatory Ventilation)  RR (machine): 18  TV (machine): 350  FiO2: 100  PEEP: 8  ITime: 1  MAP: 17  PIP: 41      Input and Output:  I&O's Detail    25 Nov 2022 07:01  -  26 Nov 2022 07:00  --------------------------------------------------------  IN:    Enteral Tube Flush: 500 mL    Glucerna 1.5: 1000 mL    IV PiggyBack: 200 mL    Norepinephrine: 191.8 mL  Total IN: 1891.8 mL    OUT:    Incontinent per Collection Bag (mL): 950 mL    Voided (mL): 700 mL  Total OUT: 1650 mL    Total NET: 241.8 mL          Lab Data                        7.5    9.41  )-----------( 193      ( 26 Nov 2022 06:09 )             24.9     11-26    135  |  100  |  27<H>  ----------------------------<  161<H>  5.7<H>   |  30  |  1.26    Ca    8.0<L>      26 Nov 2022 06:09  Phos  2.9     11-26    TPro  6.8  /  Alb  1.7<L>  /  TBili  0.2  /  DBili  x   /  AST  26  /  ALT  17  /  AlkPhos  142<H>  11-26            Review of Systems	      Objective     Physical Examination    heart s1s2  lung dec BS  head nc      Pertinent Lab findings & Imaging      Annalise:  NO   Adequate UO     I&O's Detail    25 Nov 2022 07:01  -  26 Nov 2022 07:00  --------------------------------------------------------  IN:    Enteral Tube Flush: 500 mL    Glucerna 1.5: 1000 mL    IV PiggyBack: 200 mL    Norepinephrine: 191.8 mL  Total IN: 1891.8 mL    OUT:    Incontinent per Collection Bag (mL): 950 mL    Voided (mL): 700 mL  Total OUT: 1650 mL    Total NET: 241.8 mL               Discussed with:     Cultures:	        Radiology

## 2022-11-26 NOTE — PROGRESS NOTE ADULT - SUBJECTIVE AND OBJECTIVE BOX
INTERVAL HPI/OVERNIGHT EVENTS:  No new overnight event.  No N/V/D.  Tolerating diet.     MEDICATIONS  (STANDING):  albuterol    90 MICROgram(s) HFA Inhaler 2 Puff(s) Inhalation two times a day  ampicillin/sulbactam  IVPB 3 Gram(s) IV Intermittent every 6 hours  chlorhexidine 2% Cloths 1 Application(s) Topical daily  dextrose 5%. 1000 milliLiter(s) (50 mL/Hr) IV Continuous <Continuous>  dextrose 5%. 1000 milliLiter(s) (100 mL/Hr) IV Continuous <Continuous>  dextrose 50% Injectable 25 Gram(s) IV Push once  dextrose 50% Injectable 12.5 Gram(s) IV Push once  dextrose 50% Injectable 25 Gram(s) IV Push once  glucagon  Injectable 1 milliGRAM(s) IntraMuscular once  heparin   Injectable 5000 Unit(s) SubCutaneous every 12 hours  insulin glargine Injectable (LANTUS) 8 Unit(s) SubCutaneous every morning  insulin lispro (ADMELOG) corrective regimen sliding scale   SubCutaneous every 6 hours  LORazepam     Tablet 1 milliGRAM(s) Oral every 4 hours  midodrine 15 milliGRAM(s) Oral every 8 hours  norepinephrine Infusion 0.05 MICROgram(s)/kG/Min (5.35 mL/Hr) IV Continuous <Continuous>  pantoprazole  Injectable 40 milliGRAM(s) IV Push daily  polyethylene glycol 3350 17 Gram(s) Oral every 12 hours  povidone iodine 10% Solution 1 Application(s) Topical daily  senna 2 Tablet(s) Oral at bedtime  sodium chloride 0.9%. 1000 milliLiter(s) (100 mL/Hr) IV Continuous <Continuous>    MEDICATIONS  (PRN):  dextrose Oral Gel 15 Gram(s) Oral once PRN Blood Glucose LESS THAN 70 milliGRAM(s)/deciliter  LORazepam   Injectable 2 milliGRAM(s) IV Push every 6 hours PRN Agitation  sodium chloride 0.9% lock flush 10 milliLiter(s) IV Push every 1 hour PRN Pre/post blood products, medications, blood draw, and to maintain line patency      Allergies    codeine (Hives)    Intolerances        Review of Systems:    General:  No wt loss, fevers, chills, night sweats,fatigue,   Eyes:  Good vision, no reported pain  ENT:  No sore throat, pain, runny nose, dysphagia  CV:  No pain, palpitatioins, hypo/hypertension  Resp:  No dyspnea, cough, tachypnea, wheezing  GI:  No pain, No nausea, No vomiting, No diarrhea, No constipatiion, No weight loss, No fever, No pruritis, No rectal bleeding, No tarry stools, No dysphagia,  :  No pain, bleeding, incontinence, nocturia  Muscle:  No pain, weakness  Neuro:  No weakness, tingling, memory problems  Psych:  No fatigue, insomnia, mood problems, depression  Endocrine:  No polyuria, polydypsia, cold/heat intolerance  Heme:  No petechiae, ecchymosis, easy bruisability  Skin:  No rash, tattoos, scars, edema      Vital Signs Last 24 Hrs  T(C): 36.2 (26 Nov 2022 08:00), Max: 37.2 (25 Nov 2022 15:40)  T(F): 97.2 (26 Nov 2022 08:00), Max: 98.9 (25 Nov 2022 15:40)  HR: 50 (26 Nov 2022 10:00) (48 - 79)  BP: 128/74 (26 Nov 2022 10:00) (79/48 - 142/43)  BP(mean): 90 (26 Nov 2022 10:00) (58 - 94)  RR: 19 (26 Nov 2022 10:00) (11 - 29)  SpO2: 95% (26 Nov 2022 10:00) (91% - 100%)    Parameters below as of 26 Nov 2022 10:00  Patient On (Oxygen Delivery Method): ventilator    O2 Concentration (%): 100    PHYSICAL EXAM:    Constitutional: NAD, well-developed  HEENT: EOMI, throat clear  Neck: No LAD, supple  Respiratory: CTA and P  Cardiovascular: S1 and S2, RRR, no M  Gastrointestinal: BS+, soft, NT/ND, neg HSM,  Extremities: No peripheral edema, neg clubing, cyanosis  Vascular: 2+ peripheral pulses  Neurological: A/O x 3, no focal deficits  Psychiatric: Normal mood, normal affect  Skin: No rashes      LABS:                        7.5    9.41  )-----------( 193      ( 26 Nov 2022 06:09 )             24.9     11-26    135  |  100  |  27<H>  ----------------------------<  161<H>  5.7<H>   |  30  |  1.26    Ca    8.0<L>      26 Nov 2022 06:09  Phos  2.9     11-26    TPro  6.8  /  Alb  1.7<L>  /  TBili  0.2  /  DBili  x   /  AST  26  /  ALT  17  /  AlkPhos  142<H>  11-26    PT/INR - ( 26 Nov 2022 06:09 )   PT: 14.0 sec;   INR: 1.21 ratio               RADIOLOGY & ADDITIONAL TESTS:

## 2022-11-26 NOTE — PROGRESS NOTE ADULT - ASSESSMENT
Assessment   76 y/o Full code F with pmhx of dementia, COPD with chronic respiratory failure, S/p Trach and peg ( Vent Dependent), Cardiac arrest, quadriplegia, CVA, CKD, Anemia, and multiple admissions to hospital ( twice in past 6 weeks) including being discharged on 11/16 with VAP presents to ED from [facility] with fevers and abnormal labs. Elevated WBC with left shift, febrile, CXR shows worsening R lung infiltrates, appears to have septic shock and worsening VAP. Tracheal aspirate showed Acinetobacter baumannii that is resistant to bactrim, penams now on unasyn. Remains vented. On low dose levo. Remains in [facility] for active treatment of    1. Septic Shock  2. PNA  3. Acute on chronic hypoxic RF    Plan   -Ativan prn and standing  - Albuterol BID. Remains on vent activley titrating Fio2 to maintain Spo2 > 92 %  -  Remains on low dose levo, titrate to maintain map > 65. Midodrine 10 mg TID  - Bowel regimen. IV PPI prophylaxis   - Trend Scr, near baseline,, Trend lytes and replete as needed.  Avoid nephrotoxic agents  - Hep Sub Q for DVT prophylaxis  - Tracheal aspirate as Acinetobacter Baumannii resistant to bactrim penam, on zosyn. ID consult appreciated   - Standing insulin and ISS. Goal -180

## 2022-11-26 NOTE — PROGRESS NOTE ADULT - SUBJECTIVE AND OBJECTIVE BOX
CC:  Patient is a 77y old  Female who presents with a chief complaint of diaphoresis and fever (25 Nov 2022 11:45)    Overnight events: remains on low dose levo support. On abx.     PAST MEDICAL & SURGICAL HISTORY:  Dementia of frontal lobe type      Aphasic stroke      Diabetes mellitus      Respiratory failure      Hypertension      GERD (gastroesophageal reflux disease)      Constipation      Respiratory failure      CVA (cerebral vascular accident)      HTN (hypertension)      DM (diabetes mellitus)      Advanced dementia      COVID-19 virus detected      Quadriplegia      Pneumonia      Type II diabetes mellitus      Hx of appendectomy      Gastrostomy in place      Tracheostomy in place      Tracheostomy tube present      Feeding by G-tube        Allergies    codeine (Hives)    Intolerances      FAMILY HISTORY:  No pertinent family history in first degree relatives        Review of Systems:  Negative 2/2 mental status    Medications:  ampicillin/sulbactam  IVPB 3 Gram(s) IV Intermittent every 6 hours    midodrine 15 milliGRAM(s) Oral every 8 hours  norepinephrine Infusion 0.05 MICROgram(s)/kG/Min IV Continuous <Continuous>    albuterol    90 MICROgram(s) HFA Inhaler 2 Puff(s) Inhalation two times a day    LORazepam     Tablet 1 milliGRAM(s) Oral every 4 hours  LORazepam   Injectable 2 milliGRAM(s) IV Push every 6 hours PRN      heparin   Injectable 5000 Unit(s) SubCutaneous every 12 hours    pantoprazole  Injectable 40 milliGRAM(s) IV Push daily  polyethylene glycol 3350 17 Gram(s) Oral every 12 hours  senna 2 Tablet(s) Oral at bedtime      dextrose 50% Injectable 25 Gram(s) IV Push once  dextrose 50% Injectable 12.5 Gram(s) IV Push once  dextrose 50% Injectable 25 Gram(s) IV Push once  dextrose Oral Gel 15 Gram(s) Oral once PRN  glucagon  Injectable 1 milliGRAM(s) IntraMuscular once  insulin glargine Injectable (LANTUS) 8 Unit(s) SubCutaneous every morning  insulin lispro (ADMELOG) corrective regimen sliding scale   SubCutaneous every 6 hours    dextrose 5%. 1000 milliLiter(s) IV Continuous <Continuous>  dextrose 5%. 1000 milliLiter(s) IV Continuous <Continuous>  sodium chloride 0.9% lock flush 10 milliLiter(s) IV Push every 1 hour PRN  sodium chloride 0.9%. 1000 milliLiter(s) IV Continuous <Continuous>      chlorhexidine 2% Cloths 1 Application(s) Topical daily  povidone iodine 10% Solution 1 Application(s) Topical daily        Mode: AC/ CMV (Assist Control/ Continuous Mandatory Ventilation)  RR (machine): 18  TV (machine): 350  FiO2: 100  PEEP: 8  ITime: 1  MAP: 18  PIP: 42      ICU Vital Signs Last 24 Hrs  T(C): 37.2 (25 Nov 2022 15:40), Max: 37.2 (25 Nov 2022 15:40)  T(F): 98.9 (25 Nov 2022 15:40), Max: 98.9 (25 Nov 2022 15:40)  HR: 53 (25 Nov 2022 19:00) (48 - 60)  BP: 119/45 (25 Nov 2022 19:00) (79/48 - 132/55)  BP(mean): 68 (25 Nov 2022 19:00) (58 - 118)  ABP: --  ABP(mean): --  RR: 22 (25 Nov 2022 19:00) (13 - 29)  SpO2: 99% (25 Nov 2022 19:00) (91% - 99%)    O2 Parameters below as of 25 Nov 2022 19:00  Patient On (Oxygen Delivery Method): ventilator    O2 Concentration (%): 100      Vital Signs Last 24 Hrs  T(C): 37.2 (25 Nov 2022 15:40), Max: 37.2 (25 Nov 2022 15:40)  T(F): 98.9 (25 Nov 2022 15:40), Max: 98.9 (25 Nov 2022 15:40)  HR: 53 (25 Nov 2022 19:00) (48 - 60)  BP: 119/45 (25 Nov 2022 19:00) (79/48 - 132/55)  BP(mean): 68 (25 Nov 2022 19:00) (58 - 118)  RR: 22 (25 Nov 2022 19:00) (13 - 29)  SpO2: 99% (25 Nov 2022 19:00) (91% - 99%)    Parameters below as of 25 Nov 2022 19:00  Patient On (Oxygen Delivery Method): ventilator    O2 Concentration (%): 100        I&O's Detail    24 Nov 2022 07:01  -  25 Nov 2022 07:00  --------------------------------------------------------  IN:    Enteral Tube Flush: 325 mL    Glucerna 1.5: 800 mL    IV PiggyBack: 16 mL    Norepinephrine: 106.7 mL    sodium chloride 0.9%: 800 mL  Total IN: 2047.7 mL    OUT:    Voided (mL): 1310 mL  Total OUT: 1310 mL    Total NET: 737.7 mL      25 Nov 2022 07:01  -  25 Nov 2022 20:26  --------------------------------------------------------  IN:    Enteral Tube Flush: 225 mL    Glucerna 1.5: 450 mL    Norepinephrine: 100.9 mL  Total IN: 775.9 mL    OUT:    Voided (mL): 700 mL  Total OUT: 700 mL    Total NET: 75.9 mL            LABS:                        7.4    9.01  )-----------( 197      ( 25 Nov 2022 05:54 )             24.7     11-25    131<L>  |  98  |  28<H>  ----------------------------<  125<H>  5.1   |  29  |  1.19    Ca    7.3<L>      25 Nov 2022 05:54  Phos  3.0     11-24  Mg     2.3     11-24            CAPILLARY BLOOD GLUCOSE      POCT Blood Glucose.: 117 mg/dL (25 Nov 2022 17:20)        CULTURES:  Culture Results:   Moderate Acinetobacter baumannii/nosocom group (Carbapenem Resistant)  Normal Respiratory Elvira present (11-21 @ 20:14)  Culture Results:   <10,000 CFU/mL Normal Urogenital Elvira (11-21 @ 04:25)  Culture Results:   No growth to date. (11-21 @ 03:56)  Culture Results:   No growth to date. (11-21 @ 03:56)    ampicillin/sulbactam  IVPB 3 Gram(s) IV Intermittent every 6 hours      Physical Examination:  General: No acute distress.    NEURO: A O x 0 but arousable, at baseline. Cn2-12 intact   HEENT: Pupils equal, reactive to light.  Symmetric.  PULM: CTA BL, no significant sputum production, no wheezes, rales, rhonchi  CVS: Regular rate and rhythm, no murmurs, rubs, or gallops  ABD: Soft, nondistended, nontender, normoactive bowel sounds, no masses  EXT: No edema, nontender  SKIN: Warm and well perfused, no rashes noted      RADIOLOGY:   < from: Xray Chest 1 View- PORTABLE-Urgent (11.21.22 @ 04:03) >  IMPRESSION: Initial CHF and right effusion improved on the latest study.   Right jugular line inserted.    --- End of Report ---    < end of copied text >

## 2022-11-26 NOTE — PROGRESS NOTE ADULT - SUBJECTIVE AND OBJECTIVE BOX
BREA BECKHAM     [facility]    Allergies    codeine (Hives)    Intolerances        PAST MEDICAL & SURGICAL HISTORY:  Dementia of frontal lobe type      Aphasic stroke      Diabetes mellitus      Respiratory failure      Hypertension      GERD (gastroesophageal reflux disease)      Constipation      Respiratory failure      CVA (cerebral vascular accident)      HTN (hypertension)      DM (diabetes mellitus)      Advanced dementia      COVID-19 virus detected      Quadriplegia      Pneumonia      Type II diabetes mellitus      Hx of appendectomy      Gastrostomy in place      Tracheostomy in place      Tracheostomy tube present      Feeding by G-tube          FAMILY HISTORY:  No pertinent family history in first degree relatives        Home Medications:  Admelog 100 units/mL injectable solution: injectable every 6 hours SS (21 Nov 2022 05:08)  albuterol 90 mcg/inh inhalation aerosol with adapter: 2  inhaled every 6 hours (21 Nov 2022 04:24)  Bacid (LAC) oral tablet: 2 tab(s) by gastrostomy tube once a day (21 Nov 2022 04:24)  bacitracin 500 units/g topical ointment: Apply topically to affected area once a day to knees (21 Nov 2022 04:24)  chlorhexidine 0.12% mucous membrane liquid: 15 milliliter(s) mucous membrane 2 times a day (21 Nov 2022 04:24)  Dakins Full Strength 0.5% topical solution: Apply topically to affected area 2 times a day then apply santyl and calcium alginate (21 Nov 2022 04:24)  Eucerin topical cream: Apply topically to affected area once a day bilateral feet (21 Nov 2022 04:24)  ferrous sulfate 325 mg (65 mg elemental iron) oral tablet: 1 tab(s) by gastrostomy tube once a day (21 Nov 2022 04:24)  insulin glargine 100 units/mL subcutaneous solution: 8 unit(s) subcutaneous once a day (in the morning) (21 Nov 2022 04:24)  ipratropium-albuterol 0.5 mg-2.5 mg/3 mL inhalation solution: 3 milliliter(s) inhaled every 6 hours (21 Nov 2022 04:24)  LORazepam 1 mg oral tablet: 1 tab(s) by gastrostomy tube every 4 hours (21 Nov 2022 04:24)  midodrine 10 mg oral tablet: 1 tab(s) by gastrostomy tube every 8 hours (21 Nov 2022 04:24)  MiraLax oral powder for reconstitution: orally once a day (at bedtime) (21 Nov 2022 05:08)  mulivitamin: by gastrostomy tube once a day (21 Nov 2022 04:24)  nystatin 100,000 units/g topical powder: 1 application topically 3 times a day (21 Nov 2022 04:24)  omeprazole 40 mg oral delayed release capsule: 1 cap(s) by gastrostomy tube 2 times a day (21 Nov 2022 04:24)  polyethylene glycol 3350 oral powder for reconstitution: 17 gram(s) by gastrostomy tube every 12 hours (21 Nov 2022 04:24)  senna 8.6 mg oral tablet: 3 tab(s) by gastrostomy tube once a day (at bedtime) (21 Nov 2022 04:24)  simethicone 80 mg oral tablet, chewable: 1 tab(s) by gastrostomy tube every 6 hours (21 Nov 2022 04:24)  sucralfate 1 g/10 mL oral suspension: 10 milliliter(s) g-tube 4 times a day (before meals and at bedtime) (21 Nov 2022 04:24)  Tylenol 325 mg oral tablet: 2 tab(s) orally every 6 hours, As Needed prior to dressing change (21 Nov 2022 04:24)      MEDICATIONS  (STANDING):  albuterol    90 MICROgram(s) HFA Inhaler 2 Puff(s) Inhalation two times a day  ampicillin/sulbactam  IVPB 3 Gram(s) IV Intermittent every 6 hours  chlorhexidine 2% Cloths 1 Application(s) Topical daily  dextrose 5%. 1000 milliLiter(s) (50 mL/Hr) IV Continuous <Continuous>  dextrose 5%. 1000 milliLiter(s) (100 mL/Hr) IV Continuous <Continuous>  dextrose 50% Injectable 25 Gram(s) IV Push once  dextrose 50% Injectable 12.5 Gram(s) IV Push once  dextrose 50% Injectable 25 Gram(s) IV Push once  glucagon  Injectable 1 milliGRAM(s) IntraMuscular once  heparin   Injectable 5000 Unit(s) SubCutaneous every 12 hours  insulin glargine Injectable (LANTUS) 8 Unit(s) SubCutaneous every morning  insulin lispro (ADMELOG) corrective regimen sliding scale   SubCutaneous every 6 hours  LORazepam     Tablet 1 milliGRAM(s) Oral every 4 hours  midodrine 15 milliGRAM(s) Oral every 8 hours  norepinephrine Infusion 0.05 MICROgram(s)/kG/Min (5.35 mL/Hr) IV Continuous <Continuous>  pantoprazole  Injectable 40 milliGRAM(s) IV Push daily  polyethylene glycol 3350 17 Gram(s) Oral every 12 hours  povidone iodine 10% Solution 1 Application(s) Topical daily  senna 2 Tablet(s) Oral at bedtime  sodium chloride 0.9%. 1000 milliLiter(s) (100 mL/Hr) IV Continuous <Continuous>    MEDICATIONS  (PRN):  dextrose Oral Gel 15 Gram(s) Oral once PRN Blood Glucose LESS THAN 70 milliGRAM(s)/deciliter  LORazepam   Injectable 2 milliGRAM(s) IV Push every 6 hours PRN Agitation  sodium chloride 0.9% lock flush 10 milliLiter(s) IV Push every 1 hour PRN Pre/post blood products, medications, blood draw, and to maintain line patency      Diet, NPO with Tube Feed:   Tube Feeding Modality: Gastrostomy  Glucerna 1.5 Horacio  Total Volume for 24 Hours (mL): 1000  Continuous  Starting Tube Feed Rate mL per Hour: 10  Increase Tube Feed Rate by (mL): 10     Every 10 hours  Until Goal Tube Feed Rate (mL per Hour): 50  Tube Feed Duration (in Hours): 20  Tube Feed Start Time: 17:00  Free Water Flush  Pump   Rate (mL per Hour): 25   Frequency: Every Hour    Duration (Hours): 24 (11-21-22 @ 05:10) [Active]          Vital Signs Last 24 Hrs  T(C): 36.7 (26 Nov 2022 04:46), Max: 37.2 (25 Nov 2022 15:40)  T(F): 98 (26 Nov 2022 04:46), Max: 98.9 (25 Nov 2022 15:40)  HR: 54 (26 Nov 2022 06:00) (48 - 79)  BP: 118/56 (26 Nov 2022 06:00) (79/48 - 131/57)  BP(mean): 74 (26 Nov 2022 06:00) (58 - 94)  RR: 20 (26 Nov 2022 06:00) (11 - 29)  SpO2: 100% (26 Nov 2022 06:00) (91% - 100%)    Parameters below as of 26 Nov 2022 06:00  Patient On (Oxygen Delivery Method): ventilator    O2 Concentration (%): 100      11-25-22 @ 07:01  -  11-26-22 @ 07:00  --------------------------------------------------------  IN: 1891.8 mL / OUT: 1650 mL / NET: 241.8 mL        Mode: AC/ CMV (Assist Control/ Continuous Mandatory Ventilation), RR (machine): 18, TV (machine): 350, FiO2: 100, PEEP: 8, ITime: 1, MAP: 17, PIP: 41      LABS:                        7.5    9.41  )-----------( 193      ( 26 Nov 2022 06:09 )             24.9     11-26    135  |  100  |  27<H>  ----------------------------<  161<H>  5.7<H>   |  30  |  1.26    Ca    8.0<L>      26 Nov 2022 06:09  Phos  2.9     11-26    TPro  6.8  /  Alb  1.7<L>  /  TBili  0.2  /  DBili  x   /  AST  26  /  ALT  17  /  AlkPhos  142<H>  11-26    PT/INR - ( 26 Nov 2022 06:09 )   PT: 14.0 sec;   INR: 1.21 ratio                   WBC:  WBC Count: 9.41 K/uL (11-26 @ 06:09)  WBC Count: 9.01 K/uL (11-25 @ 05:54)  WBC Count: 12.15 K/uL (11-24 @ 05:30)  WBC Count: 8.82 K/uL (11-23 @ 16:08)      MICROBIOLOGY:  RECENT CULTURES:  11-21 Trach Asp Tracheal Aspirate Acinetobacter baumannii/nosocom group (Carbapenem Resistant)   Moderate polymorphonuclear leukocytes per low power field  No Squamous epithelial cells per low power field  No organisms seen   Moderate Acinetobacter baumannii/nosocom group (Carbapenem Resistant)  Normal Respiratory Elvira present    11-21 Clean Catch Clean Catch (Midstream) XXXX XXXX   <10,000 CFU/mL Normal Urogenital Elvira    11-21 .Blood Blood-Peripheral XXXX XXXX   No growth to date.                PT/INR - ( 26 Nov 2022 06:09 )   PT: 14.0 sec;   INR: 1.21 ratio             Sodium:  Sodium, Serum: 135 mmol/L (11-26 @ 06:09)  Sodium, Serum: 131 mmol/L (11-25 @ 05:54)  Sodium, Serum: 130 mmol/L (11-24 @ 05:30)      1.26 mg/dL 11-26 @ 06:09  1.19 mg/dL 11-25 @ 05:54  1.13 mg/dL 11-24 @ 05:30      Hemoglobin:  Hemoglobin: 7.5 g/dL (11-26 @ 06:09)  Hemoglobin: 7.4 g/dL (11-25 @ 05:54)  Hemoglobin: 7.7 g/dL (11-24 @ 05:30)  Hemoglobin: 7.7 g/dL (11-23 @ 16:08)      Platelets: Platelet Count - Automated: 193 K/uL (11-26 @ 06:09)  Platelet Count - Automated: 197 K/uL (11-25 @ 05:54)  Platelet Count - Automated: 199 K/uL (11-24 @ 05:30)  Platelet Count - Automated: 181 K/uL (11-23 @ 16:08)      LIVER FUNCTIONS - ( 26 Nov 2022 06:09 )  Alb: 1.7 g/dL / Pro: 6.8 g/dL / ALK PHOS: 142 U/L / ALT: 17 U/L DA / AST: 26 U/L / GGT: x                 RADIOLOGY & ADDITIONAL STUDIES:      MICROBIOLOGY:  RECENT CULTURES:  11-21 Trach Asp Tracheal Aspirate Acinetobacter baumannii/nosocom group (Carbapenem Resistant)   Moderate polymorphonuclear leukocytes per low power field  No Squamous epithelial cells per low power field  No organisms seen   Moderate Acinetobacter baumannii/nosocom group (Carbapenem Resistant)  Normal Respiratory Elvira present    11-21 Clean Catch Clean Catch (Midstream) XXXX XXXX   <10,000 CFU/mL Normal Urogenital Elvira    11-21 .Blood Blood-Peripheral XXXX XXXX   No growth to date.

## 2022-11-26 NOTE — PROGRESS NOTE ADULT - ASSESSMENT
77F with dementia, COPD with chronic respiratory failure s/p trach, cardiac arrest, CHH, quadriplegia, CVA, CKD, anemia, PEG tube, and multiple hospital admissions for respiratory distress presents to the ED BIBEMS from [facility] for diaphoresis and fever.       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

## 2022-11-27 LAB
ALBUMIN SERPL ELPH-MCNC: 1.7 G/DL — LOW (ref 3.3–5)
ALP SERPL-CCNC: 138 U/L — HIGH (ref 30–120)
ALT FLD-CCNC: 15 U/L DA — SIGNIFICANT CHANGE UP (ref 10–60)
ANION GAP SERPL CALC-SCNC: 4 MMOL/L — LOW (ref 5–17)
ANION GAP SERPL CALC-SCNC: 4 MMOL/L — LOW (ref 5–17)
AST SERPL-CCNC: 23 U/L — SIGNIFICANT CHANGE UP (ref 10–40)
BILIRUB SERPL-MCNC: 0.3 MG/DL — SIGNIFICANT CHANGE UP (ref 0.2–1.2)
BUN SERPL-MCNC: 28 MG/DL — HIGH (ref 7–23)
BUN SERPL-MCNC: 28 MG/DL — HIGH (ref 7–23)
CALCIUM SERPL-MCNC: 8 MG/DL — LOW (ref 8.4–10.5)
CALCIUM SERPL-MCNC: 8.1 MG/DL — LOW (ref 8.4–10.5)
CHLORIDE SERPL-SCNC: 102 MMOL/L — SIGNIFICANT CHANGE UP (ref 96–108)
CHLORIDE SERPL-SCNC: 102 MMOL/L — SIGNIFICANT CHANGE UP (ref 96–108)
CO2 SERPL-SCNC: 31 MMOL/L — SIGNIFICANT CHANGE UP (ref 22–31)
CO2 SERPL-SCNC: 31 MMOL/L — SIGNIFICANT CHANGE UP (ref 22–31)
CREAT SERPL-MCNC: 1.17 MG/DL — SIGNIFICANT CHANGE UP (ref 0.5–1.3)
CREAT SERPL-MCNC: 1.2 MG/DL — SIGNIFICANT CHANGE UP (ref 0.5–1.3)
EGFR: 46 ML/MIN/1.73M2 — LOW
EGFR: 47 ML/MIN/1.73M2 — LOW
GLUCOSE SERPL-MCNC: 117 MG/DL — HIGH (ref 70–99)
GLUCOSE SERPL-MCNC: 87 MG/DL — SIGNIFICANT CHANGE UP (ref 70–99)
HCT VFR BLD CALC: 22.3 % — LOW (ref 34.5–45)
HCT VFR BLD CALC: 24.3 % — LOW (ref 34.5–45)
HGB BLD-MCNC: 6.7 G/DL — CRITICAL LOW (ref 11.5–15.5)
HGB BLD-MCNC: 7.2 G/DL — LOW (ref 11.5–15.5)
MAGNESIUM SERPL-MCNC: 2.4 MG/DL — SIGNIFICANT CHANGE UP (ref 1.6–2.6)
MCHC RBC-ENTMCNC: 26.8 PG — LOW (ref 27–34)
MCHC RBC-ENTMCNC: 27.6 PG — SIGNIFICANT CHANGE UP (ref 27–34)
MCHC RBC-ENTMCNC: 29.6 GM/DL — LOW (ref 32–36)
MCHC RBC-ENTMCNC: 30 GM/DL — LOW (ref 32–36)
MCV RBC AUTO: 90.3 FL — SIGNIFICANT CHANGE UP (ref 80–100)
MCV RBC AUTO: 91.8 FL — SIGNIFICANT CHANGE UP (ref 80–100)
NRBC # BLD: 0 /100 WBCS — SIGNIFICANT CHANGE UP (ref 0–0)
NRBC # BLD: 0 /100 WBCS — SIGNIFICANT CHANGE UP (ref 0–0)
PHOSPHATE SERPL-MCNC: 3.3 MG/DL — SIGNIFICANT CHANGE UP (ref 2.5–4.5)
PLATELET # BLD AUTO: 160 K/UL — SIGNIFICANT CHANGE UP (ref 150–400)
PLATELET # BLD AUTO: 176 K/UL — SIGNIFICANT CHANGE UP (ref 150–400)
POTASSIUM SERPL-MCNC: 5.9 MMOL/L — HIGH (ref 3.5–5.3)
POTASSIUM SERPL-MCNC: 6 MMOL/L — HIGH (ref 3.5–5.3)
POTASSIUM SERPL-SCNC: 5.9 MMOL/L — HIGH (ref 3.5–5.3)
POTASSIUM SERPL-SCNC: 6 MMOL/L — HIGH (ref 3.5–5.3)
PROT SERPL-MCNC: 6.4 G/DL — SIGNIFICANT CHANGE UP (ref 6–8.3)
RBC # BLD: 2.43 M/UL — LOW (ref 3.8–5.2)
RBC # BLD: 2.69 M/UL — LOW (ref 3.8–5.2)
RBC # FLD: 17.3 % — HIGH (ref 10.3–14.5)
RBC # FLD: 17.3 % — HIGH (ref 10.3–14.5)
SODIUM SERPL-SCNC: 137 MMOL/L — SIGNIFICANT CHANGE UP (ref 135–145)
SODIUM SERPL-SCNC: 137 MMOL/L — SIGNIFICANT CHANGE UP (ref 135–145)
WBC # BLD: 9.13 K/UL — SIGNIFICANT CHANGE UP (ref 3.8–10.5)
WBC # BLD: 9.55 K/UL — SIGNIFICANT CHANGE UP (ref 3.8–10.5)
WBC # FLD AUTO: 9.13 K/UL — SIGNIFICANT CHANGE UP (ref 3.8–10.5)
WBC # FLD AUTO: 9.55 K/UL — SIGNIFICANT CHANGE UP (ref 3.8–10.5)

## 2022-11-27 PROCEDURE — 99233 SBSQ HOSP IP/OBS HIGH 50: CPT

## 2022-11-27 RX ORDER — SODIUM ZIRCONIUM CYCLOSILICATE 10 G/10G
10 POWDER, FOR SUSPENSION ORAL ONCE
Refills: 0 | Status: DISCONTINUED | OUTPATIENT
Start: 2022-11-27 | End: 2022-11-27

## 2022-11-27 RX ORDER — DEXTROSE 50 % IN WATER 50 %
50 SYRINGE (ML) INTRAVENOUS ONCE
Refills: 0 | Status: COMPLETED | OUTPATIENT
Start: 2022-11-27 | End: 2022-11-27

## 2022-11-27 RX ORDER — INSULIN HUMAN 100 [IU]/ML
10 INJECTION, SOLUTION SUBCUTANEOUS ONCE
Refills: 0 | Status: COMPLETED | OUTPATIENT
Start: 2022-11-27 | End: 2022-11-27

## 2022-11-27 RX ORDER — SODIUM ZIRCONIUM CYCLOSILICATE 10 G/10G
10 POWDER, FOR SUSPENSION ORAL THREE TIMES A DAY
Refills: 0 | Status: COMPLETED | OUTPATIENT
Start: 2022-11-27 | End: 2022-11-29

## 2022-11-27 RX ADMIN — Medication 1 MILLIGRAM(S): at 21:34

## 2022-11-27 RX ADMIN — Medication 50 MILLILITER(S): at 21:33

## 2022-11-27 RX ADMIN — INSULIN HUMAN 10 UNIT(S): 100 INJECTION, SOLUTION SUBCUTANEOUS at 21:33

## 2022-11-27 RX ADMIN — ALBUTEROL 2 PUFF(S): 90 AEROSOL, METERED ORAL at 07:51

## 2022-11-27 RX ADMIN — PANTOPRAZOLE SODIUM 40 MILLIGRAM(S): 20 TABLET, DELAYED RELEASE ORAL at 10:27

## 2022-11-27 RX ADMIN — Medication 1 MILLIGRAM(S): at 17:57

## 2022-11-27 RX ADMIN — Medication 2 MILLIGRAM(S): at 15:46

## 2022-11-27 RX ADMIN — ALBUTEROL 2 PUFF(S): 90 AEROSOL, METERED ORAL at 19:46

## 2022-11-27 RX ADMIN — AMPICILLIN SODIUM AND SULBACTAM SODIUM 200 GRAM(S): 250; 125 INJECTION, POWDER, FOR SUSPENSION INTRAMUSCULAR; INTRAVENOUS at 00:29

## 2022-11-27 RX ADMIN — HEPARIN SODIUM 5000 UNIT(S): 5000 INJECTION INTRAVENOUS; SUBCUTANEOUS at 17:58

## 2022-11-27 RX ADMIN — Medication 1 APPLICATION(S): at 10:27

## 2022-11-27 RX ADMIN — SODIUM ZIRCONIUM CYCLOSILICATE 10 GRAM(S): 10 POWDER, FOR SUSPENSION ORAL at 21:34

## 2022-11-27 RX ADMIN — Medication 1 MILLIGRAM(S): at 14:33

## 2022-11-27 RX ADMIN — Medication 1 MILLIGRAM(S): at 05:57

## 2022-11-27 RX ADMIN — HEPARIN SODIUM 5000 UNIT(S): 5000 INJECTION INTRAVENOUS; SUBCUTANEOUS at 05:57

## 2022-11-27 RX ADMIN — Medication 2 MILLIGRAM(S): at 08:07

## 2022-11-27 RX ADMIN — AMPICILLIN SODIUM AND SULBACTAM SODIUM 200 GRAM(S): 250; 125 INJECTION, POWDER, FOR SUSPENSION INTRAMUSCULAR; INTRAVENOUS at 05:57

## 2022-11-27 RX ADMIN — CHLORHEXIDINE GLUCONATE 1 APPLICATION(S): 213 SOLUTION TOPICAL at 06:55

## 2022-11-27 RX ADMIN — AMPICILLIN SODIUM AND SULBACTAM SODIUM 200 GRAM(S): 250; 125 INJECTION, POWDER, FOR SUSPENSION INTRAMUSCULAR; INTRAVENOUS at 12:22

## 2022-11-27 RX ADMIN — MIDODRINE HYDROCHLORIDE 15 MILLIGRAM(S): 2.5 TABLET ORAL at 21:34

## 2022-11-27 RX ADMIN — POLYETHYLENE GLYCOL 3350 17 GRAM(S): 17 POWDER, FOR SOLUTION ORAL at 05:57

## 2022-11-27 RX ADMIN — INSULIN GLARGINE 8 UNIT(S): 100 INJECTION, SOLUTION SUBCUTANEOUS at 06:49

## 2022-11-27 RX ADMIN — Medication 1 MILLIGRAM(S): at 01:43

## 2022-11-27 RX ADMIN — SODIUM ZIRCONIUM CYCLOSILICATE 10 GRAM(S): 10 POWDER, FOR SUSPENSION ORAL at 14:33

## 2022-11-27 RX ADMIN — POLYETHYLENE GLYCOL 3350 17 GRAM(S): 17 POWDER, FOR SOLUTION ORAL at 17:57

## 2022-11-27 RX ADMIN — SENNA PLUS 2 TABLET(S): 8.6 TABLET ORAL at 21:34

## 2022-11-27 RX ADMIN — MIDODRINE HYDROCHLORIDE 15 MILLIGRAM(S): 2.5 TABLET ORAL at 05:57

## 2022-11-27 RX ADMIN — AMPICILLIN SODIUM AND SULBACTAM SODIUM 200 GRAM(S): 250; 125 INJECTION, POWDER, FOR SUSPENSION INTRAMUSCULAR; INTRAVENOUS at 18:23

## 2022-11-27 RX ADMIN — Medication 2: at 06:49

## 2022-11-27 RX ADMIN — Medication 1 MILLIGRAM(S): at 10:26

## 2022-11-27 NOTE — PROGRESS NOTE ADULT - ASSESSMENT
REVIEW OF SYMPTOMS      Able to give (reliable) ROS  NO     PHYSICAL EXAM    HEENT Unremarkable  atraumatic   RESP Fair air entry EXP prolonged    Harsh breath sound Resp distres mild   CARDIAC S1 S2 No S3     NO JVD    ABDOMEN SOFT BS PRESENT NOT DISTENDED No hepatosplenomegaly   PEDAL EDEMA present No calf tenderness  NO rash       GENERAL DATA .   GOC.  full code      ALLGY.     codeine                        WT. 11/21/2022 57  BMI.    11/21/2022 21                          ICU STAY.   admitted ICU 11/21/2022  COVID.  Scv2 11/21/2022 scv2 (-)    PROCEDURE.  11/21/2022 [facility] central line    BEST PRACTICE ISSUES.    HOB ELEVATN. Yes  DVT PPLX.  11/21/2022 hpsc    SQUIRES PPLX.    11/21/2022 protonix 40   INFN PPLX.    11/21/2022 chlorhexidine .12% bid (mrsa)   11/21/2022 chlorhexidine 2% (c li)   SP SW EM.         DIET.  11/21/2022 glucerna 1.5 1000 gt               VS/ PO/IO/ VENT/ DRIPS.  11/27/2022 89 100/44   11/27/2022 ac 18/550/5/100       CURRENT ADMISSION  Pt sent back from [facility] 11/21/2022 with fever abn labs Found yto be in shock Norepi started 11/21/2022   Pulm crit care consulted      RECENT ADMISSIONS.  11/12-11/16/2022 11/4-11/10/2022  11/5 stenotrophomonas  uc 11/4 vr enterococc 50-99 candida   11/4/2022 levaquin 750 dced 11/7 doxy  11/8 bactrim 250.3  10/18-11/2/2022 11/21 ADMISSION PROBLEMS.  Vent dependent   .. Trach poa 11/21/2022  .. ac 18/350/8/100   RO VTE   .. 11/26 v duplx (-)   RIJ 11/21/2022   INFECTN   .. Decub ulcers   .. VAP 11/21/2022  ...... trach 11/21 mod acinetobacter carbapenem resist   ...... Bactrim tid  11/21-11/25/2022  ...... 11/25/2022 Unasyn 3.4 x 7d Dr Chairez    COPD  Shock   .. Norepi 11/21/2022-> 11/21-11/27  .. midodrine 11/21/2022   peg poa 11/21/2022  ANEMIA   .. Hb 11/25/2022 Hb 7.5  Hyponatremia  .. Na 11/24-11/26/2022 Na 130 - 135  Hyperkalemia .  .. k 11/26/2022 k 5.7         ASSESSMENT/RECOMMENDATIONS .   RESP.  .. Gas exchange.  11/21/2022 4a On 100% vent 759/36/273   Monitor & target po 90-95%  .. VENT MANAGEMENT.   HOB elevation  Target Pplat 30 (-)  Target PO 90-95%  Target pH 730 (+)  Daily spontaneous breathing trials   Daily sedation vacation   .. Pleural effsn .   CXR 11/21/2022 r gr than l effsn  Effusion has been tapped during prev admissions and is lp exudate If fever or worsening sepsis will plan thorac  RO VTE.  11/26 v duplx (-)   11/26/2022 check cta ch   .. Bronchospasm.  11/21/2022 albut hfa 2p bid   INFECTION.  .. SCV2 status.  Scv2 11/21/2022 scv2 (-)  .. VAP   w 11/21-11/23-11/24-11/25-11/26-11/27/2022 w 16 - 8.8 - 12- 9 - 9.4- 9.1    Pr 11/21-11/22/2022 pr 1.8 - 1.1  ua 11/21/2022 w 3-5   cxr 11/21 mod to large r effsn somewhat incr from 11/12 central infiltrates possibly congestive rij   rvp 11/21/2022 (-)   rsv 11/21/2022 (-)   uc 11/21 (-)   legn 11/21 (-)   trach 11/21 mod acinetobacter carbapenem resist   mrsa 11/21 (-)   bc 11/21 (-)   11/21/2022 bactrim 285 mg trimeth q 8 h Dr BRITTANY Brantley DCED 11/25/2022 11/25/2022 Unasyn 3.4 x 7d Dr Chairez    CARDIAC.  .. Shock.    11/21/2022 midodrine 10.3 -> 11/22 midodr 15.3   11/21/2022 5a norepi 8 in 250   target map 65 (+)   .. ekg changes.  ekg 11/21/2022 s tach shoirt pr qtc 470 possible rvh   tr 11/22/2022 tr 28   GI.  .. Nutrition.   11/21/2022 glucerna 1.5 1000 gt    HEMAT.  .. DVT pplx.   11/21/2022 hpsc    .. anemia.  Hb 11/21-11/22-11/23-11/24-11/25-11/26/2022   Hb 9.6- 7.8 - 7.7 - 7.7- 7.5 - 7.5    mcv 11/21/2022 mcv 90   inr 11/21/2022 inr 122   monitor target Hb 7 (+)   RENAL.  .. Hyperkalemia .  k 11/26-11/27/2022 k 5.7 - 6   11/26/2022 lokelma dose   11/27/2022 lokelma 10.3x2d   ENDOCRINE.   .. DM   11/21/2022 glarg 8 q am   11/21/2022 riss   NEURO.  .. seizures.  11/21/2022 lorazepam 1.6     TIME SPENT   Over 39 minutes aggregate critical care time spent on encounter; activities included   direct patient care, counseling and/or coordinating care reviewing notes, lab data/ imaging , discussion with multidisciplinary team/ patient  /family and explaining in detail risks, benefits, alternatives  of the recommendations     CHAPINCITO GUIDRY 76 f [facility] S 11/21/2022   DR JSOEPH DU

## 2022-11-27 NOTE — PROGRESS NOTE ADULT - SUBJECTIVE AND OBJECTIVE BOX
Chief Complaint: Respiratory distress    Interval Events: No events overnight.    Review of Systems:  Unable to obtain    Physical Exam:  Vital Signs Last 24 Hrs  T(C): 36.7 (27 Nov 2022 08:30), Max: 37.3 (27 Nov 2022 00:00)  T(F): 98 (27 Nov 2022 08:30), Max: 99.2 (27 Nov 2022 00:00)  HR: 114 (27 Nov 2022 08:00) (49 - 123)  BP: 186/76 (27 Nov 2022 08:00) (85/43 - 198/151)  BP(mean): 104 (27 Nov 2022 08:00) (56 - 173)  RR: 38 (27 Nov 2022 08:00) (13 - 48)  SpO2: 96% (27 Nov 2022 08:00) (79% - 100%)  Parameters below as of 27 Nov 2022 08:00  Patient On (Oxygen Delivery Method): ventilator  O2 Concentration (%): 100  General: Unresponsive  HEENT: MMM  Neck: No JVD, no carotid bruit  Lungs: CTAB  CV: RRR, nl S1/S2, no M/R/G  Abdomen: S/NT/ND, +BS  Extremities: 1+ LE edema, no cyanosis  Neuro: AAOx0  Skin: No rash    Labs:    11-27    137  |  102  |  28<H>  ----------------------------<  117<H>  6.0<H>   |  31  |  1.17    Ca    8.0<L>      27 Nov 2022 06:35  Phos  2.9     11-26    TPro  6.4  /  Alb  1.7<L>  /  TBili  0.3  /  DBili  x   /  AST  23  /  ALT  15  /  AlkPhos  138<H>  11-27                        7.2    9.13  )-----------( 176      ( 27 Nov 2022 06:35 )             24.3       ECG/Telemetry: Sinus rhythm

## 2022-11-27 NOTE — PROGRESS NOTE ADULT - SUBJECTIVE AND OBJECTIVE BOX
Patient is a 77y old  Female who presents with a chief complaint of diaphoresis and fever (27 Nov 2022 16:22)      BRIEF HOSPITAL COURSE: Pt is a 76 y/o F pmhx of Dementia, COPD, chronic respiratory failure requiring trach and peg, now vent dependent, cardiac arrest, quadriplegia, CVA, and CKD presented to  ED w/ fevers and abnormal labs. Found to have a elevated WBC w/ L shift, febrile and CXR w/ worsening R infiltrates. Admitted for septic shock in setting of suspected VAP. Sputum cultures positive for Acinetobacter baumannii and started on unasyn by ID given resistance to bactrim, and penams.     Events last 24 hours: Remains on full vent support on 100% FiO2.     PAST MEDICAL & SURGICAL HISTORY:  Dementia of frontal lobe type      Aphasic stroke      Diabetes mellitus      Respiratory failure      Hypertension      GERD (gastroesophageal reflux disease)      Constipation      Respiratory failure      CVA (cerebral vascular accident)      HTN (hypertension)      DM (diabetes mellitus)      Advanced dementia      COVID-19 virus detected      Quadriplegia      Pneumonia      Type II diabetes mellitus      Hx of appendectomy      Gastrostomy in place      Tracheostomy in place      Tracheostomy tube present      Feeding by G-tube          Review of Systems:  Unable to assess secondary to mentation     Medications:  ampicillin/sulbactam  IVPB 3 Gram(s) IV Intermittent every 6 hours    midodrine 15 milliGRAM(s) Oral every 8 hours  norepinephrine Infusion 0.05 MICROgram(s)/kG/Min IV Continuous <Continuous>    albuterol    90 MICROgram(s) HFA Inhaler 2 Puff(s) Inhalation two times a day    dexMEDEtomidine Infusion 0.5 MICROgram(s)/kG/Hr IV Continuous <Continuous>  LORazepam     Tablet 1 milliGRAM(s) Oral every 4 hours  LORazepam   Injectable 2 milliGRAM(s) IV Push every 6 hours PRN      heparin   Injectable 5000 Unit(s) SubCutaneous every 12 hours    pantoprazole  Injectable 40 milliGRAM(s) IV Push daily  polyethylene glycol 3350 17 Gram(s) Oral every 12 hours  senna 2 Tablet(s) Oral at bedtime      dextrose 50% Injectable 25 Gram(s) IV Push once  dextrose 50% Injectable 12.5 Gram(s) IV Push once  dextrose 50% Injectable 25 Gram(s) IV Push once  dextrose Oral Gel 15 Gram(s) Oral once PRN  glucagon  Injectable 1 milliGRAM(s) IntraMuscular once  insulin glargine Injectable (LANTUS) 8 Unit(s) SubCutaneous every morning  insulin lispro (ADMELOG) corrective regimen sliding scale   SubCutaneous every 6 hours    dextrose 5%. 1000 milliLiter(s) IV Continuous <Continuous>  dextrose 5%. 1000 milliLiter(s) IV Continuous <Continuous>  sodium chloride 0.9% lock flush 10 milliLiter(s) IV Push every 1 hour PRN  sodium chloride 0.9%. 1000 milliLiter(s) IV Continuous <Continuous>      chlorhexidine 2% Cloths 1 Application(s) Topical daily  povidone iodine 10% Solution 1 Application(s) Topical daily    sodium zirconium cyclosilicate 10 Gram(s) Oral three times a day      Mode: AC/ CMV (Assist Control/ Continuous Mandatory Ventilation)  RR (machine): 18  TV (machine): 350  FiO2: 100  PEEP: 8  ITime: 1  MAP: 13  PIP: 35      ICU Vital Signs Last 24 Hrs  T(C): 36.8 (27 Nov 2022 16:07), Max: 37.3 (27 Nov 2022 00:00)  T(F): 98.3 (27 Nov 2022 16:07), Max: 99.2 (27 Nov 2022 00:00)  HR: 57 (27 Nov 2022 19:46) (49 - 123)  BP: 109/52 (27 Nov 2022 18:00) (85/43 - 198/151)  BP(mean): 69 (27 Nov 2022 18:00) (56 - 173)  ABP: --  ABP(mean): --  RR: 29 (27 Nov 2022 18:00) (13 - 48)  SpO2: 100% (27 Nov 2022 19:46) (79% - 100%)    O2 Parameters below as of 27 Nov 2022 19:46  Patient On (Oxygen Delivery Method): ventilator,100%                I&O's Detail    26 Nov 2022 07:01  -  27 Nov 2022 07:00  --------------------------------------------------------  IN:    Dexmedetomidine: 21.3 mL    Enteral Tube Flush: 600 mL    Free Water: 200 mL    Glucerna 1.5: 1200 mL    IV PiggyBack: 100 mL    IV PiggyBack: 200 mL    Norepinephrine: 114.8 mL  Total IN: 2436.1 mL    OUT:    Incontinent per Collection Bag (mL): 700 mL  Total OUT: 700 mL    Total NET: 1736.1 mL      27 Nov 2022 07:01  -  27 Nov 2022 20:12  --------------------------------------------------------  IN:    Enteral Tube Flush: 300 mL    Free Water: 120 mL    Glucerna 1.5: 600 mL    IV PiggyBack: 100 mL    Norepinephrine: 8.5 mL  Total IN: 1128.5 mL    OUT:    Incontinent per Collection Bag (mL): 550 mL  Total OUT: 550 mL    Total NET: 578.5 mL            LABS:                        7.2    9.13  )-----------( 176      ( 27 Nov 2022 06:35 )             24.3     11-27    137  |  102  |  28<H>  ----------------------------<  117<H>  6.0<H>   |  31  |  1.17    Ca    8.0<L>      27 Nov 2022 06:35  Phos  2.9     11-26    TPro  6.4  /  Alb  1.7<L>  /  TBili  0.3  /  DBili  x   /  AST  23  /  ALT  15  /  AlkPhos  138<H>  11-27          CAPILLARY BLOOD GLUCOSE      POCT Blood Glucose.: 124 mg/dL (27 Nov 2022 17:56)    PT/INR - ( 26 Nov 2022 06:09 )   PT: 14.0 sec;   INR: 1.21 ratio             CULTURES:  Culture Results:   Moderate Acinetobacter baumannii/nosocom group (Carbapenem Resistant)  Normal Respiratory Elvira present (11-21-22 @ 20:14)  Rapid RVP Result: NotDetec (11-21-22 @ 05:02)  Culture Results:   <10,000 CFU/mL Normal Urogenital Elvira (11-21-22 @ 04:25)  Culture Results:   No Growth Final (11-21-22 @ 03:56)  Culture Results:   No Growth Final (11-21-22 @ 03:56)      Physical Examination:    General: Pt laying in hospital bed in no acute distress.  Contracted on vent     HEENT: Pupils equal, reactive to light.  Symmetric.    PULM: Clear to auscultation bilaterally, no significant sputum production    CVS: Regular rate and rhythm, no murmurs, rubs, or gallops    ABD: Soft, nondistended, nontender, normoactive bowel sounds, no masses    EXT: No edema, nontender    SKIN: Warm and well perfused    NEURO: Unable to assess, contracted on vent.       RADIOLOGY:   < from: US Duplex Venous Lower Ext Complete, Bilateral (11.26.22 @ 23:01) >  INTERPRETATION:  CLINICAL INFORMATION: Bilateral leg swelling    COMPARISON: Venous Doppler 11/12/2022    TECHNIQUE: Duplex sonographyof the BILATERAL LOWER extremity veins with   color and spectral Doppler, with and without compression.    FINDINGS:    RIGHT:  Normal compressibility of the RIGHT common femoral, femoral and popliteal   veins.  Doppler examination shows normal spontaneous and phasic flow.  No RIGHT calf vein thrombosis is detected.    LEFT:  Normal compressibility of the LEFT common femoral, femoral and popliteal   veins.  Doppler examination shows normal spontaneous and phasic flow.  No LEFT calf vein thrombosis is detected.    IMPRESSION:  No evidence of deep venous thrombosis in either lower extremity.    --- End of Report ---            HARLEY WILCOX MD; Attending Radiologist  This document has been electronically signed. Nov 26 2022 11:06PM          CRITICAL CARE TIME SPENT: 54 minutes assessing presenting problems of acute illness, which pose high probability of life threatening deterioration or end organ damage/dysfunction, as well as medical decision making including initiating plan of care, reviewing data, reviewing radiologic exams, discussing with multidisciplinary team,  discussing goals of care with patient/family, and writing this note.  Non-inclusive of procedures performed,

## 2022-11-27 NOTE — PROGRESS NOTE ADULT - ASSESSMENT
77F with dementia, COPD with chronic respiratory failure s/p trach, cardiac arrest, CHH, quadriplegia, CVA, CKD, anemia, PEG tube, and multiple hospital admissions for respiratory distress presents to the ED BIBEMS from [facility] for diaphoresis and fever.       Sepsis 2/2 VAP - meets sepsis based on fever + tachycardia + source of infection.  Lactate 1.9  peristent hypotension  on pressors   - on Bactrim per ID until 11/27  - cont with vent settings to maintain SaO2 >92%  - cont with midodrine 15mg TID and levo for pressure support   - on ativan for agitation  - MRSA neg, trach culture +acinetobacter   - palliative care consult  wean as tolerating   Cont Unasyn x 7 days  Trend temps and cbc    Hyperkalemia  k 6.0   Lokelma        Hyponatremia  - Na of 130 today, likely hypovolemic  - urine lytes ordered  - continue to trend    Leukocytosis  - WBC WNL  - no fevers  - was on bactrim, changed to unasyn  - continue to trend     DM2 on insulin  - cont with lantus and insulin coverage scale with FS q6h while on TF    COPD   - cont with neb treatments    Anemia of chronic disease   - GI eval - conservative management   - cont with ferrous sulfate  - trend CBC    Prognosis grave, may need ethics eval

## 2022-11-27 NOTE — PROGRESS NOTE ADULT - SUBJECTIVE AND OBJECTIVE BOX
Date/Time Patient Seen:  		  Referring MD:   Data Reviewed	       Patient is a 77y old  Female who presents with a chief complaint of diaphoresis and fever (26 Nov 2022 19:42)      Subjective/HPI     PAST MEDICAL & SURGICAL HISTORY:  Dementia of frontal lobe type    Aphasic stroke    Diabetes mellitus    Respiratory failure    Hypertension    GERD (gastroesophageal reflux disease)    Constipation    Respiratory failure    CVA (cerebral vascular accident)    HTN (hypertension)    DM (diabetes mellitus)    Advanced dementia    COVID-19 virus detected    Quadriplegia    Pneumonia    Type II diabetes mellitus    Hx of appendectomy    Gastrostomy in place    Tracheostomy in place    Tracheostomy tube present    Feeding by G-tube          Medication list         MEDICATIONS  (STANDING):  albuterol    90 MICROgram(s) HFA Inhaler 2 Puff(s) Inhalation two times a day  ampicillin/sulbactam  IVPB 3 Gram(s) IV Intermittent every 6 hours  chlorhexidine 2% Cloths 1 Application(s) Topical daily  dexMEDEtomidine Infusion 0.5 MICROgram(s)/kG/Hr (7.14 mL/Hr) IV Continuous <Continuous>  dextrose 5%. 1000 milliLiter(s) (100 mL/Hr) IV Continuous <Continuous>  dextrose 5%. 1000 milliLiter(s) (50 mL/Hr) IV Continuous <Continuous>  dextrose 50% Injectable 25 Gram(s) IV Push once  dextrose 50% Injectable 12.5 Gram(s) IV Push once  dextrose 50% Injectable 25 Gram(s) IV Push once  glucagon  Injectable 1 milliGRAM(s) IntraMuscular once  heparin   Injectable 5000 Unit(s) SubCutaneous every 12 hours  insulin glargine Injectable (LANTUS) 8 Unit(s) SubCutaneous every morning  insulin lispro (ADMELOG) corrective regimen sliding scale   SubCutaneous every 6 hours  LORazepam     Tablet 1 milliGRAM(s) Oral every 4 hours  midodrine 15 milliGRAM(s) Oral every 8 hours  norepinephrine Infusion 0.05 MICROgram(s)/kG/Min (5.35 mL/Hr) IV Continuous <Continuous>  pantoprazole  Injectable 40 milliGRAM(s) IV Push daily  polyethylene glycol 3350 17 Gram(s) Oral every 12 hours  povidone iodine 10% Solution 1 Application(s) Topical daily  senna 2 Tablet(s) Oral at bedtime  sodium chloride 0.9%. 1000 milliLiter(s) (100 mL/Hr) IV Continuous <Continuous>    MEDICATIONS  (PRN):  dextrose Oral Gel 15 Gram(s) Oral once PRN Blood Glucose LESS THAN 70 milliGRAM(s)/deciliter  LORazepam   Injectable 2 milliGRAM(s) IV Push every 6 hours PRN Agitation  sodium chloride 0.9% lock flush 10 milliLiter(s) IV Push every 1 hour PRN Pre/post blood products, medications, blood draw, and to maintain line patency         Vitals log        ICU Vital Signs Last 24 Hrs  T(C): 37.3 (27 Nov 2022 00:00), Max: 37.3 (27 Nov 2022 00:00)  T(F): 99.2 (27 Nov 2022 00:00), Max: 99.2 (27 Nov 2022 00:00)  HR: 51 (27 Nov 2022 05:45) (49 - 123)  BP: 114/52 (27 Nov 2022 05:45) (85/43 - 198/151)  BP(mean): 70 (27 Nov 2022 05:45) (56 - 173)  ABP: --  ABP(mean): --  RR: 18 (27 Nov 2022 05:45) (13 - 48)  SpO2: 100% (27 Nov 2022 05:45) (79% - 100%)    O2 Parameters below as of 26 Nov 2022 22:30  Patient On (Oxygen Delivery Method): ventilator    O2 Concentration (%): 100         Mode: AC/ CMV (Assist Control/ Continuous Mandatory Ventilation)  RR (machine): 18  TV (machine): 350  FiO2: 100  PEEP: 5  ITime: 1  MAP: 16  PIP: 40      Input and Output:  I&O's Detail    25 Nov 2022 07:01  -  26 Nov 2022 07:00  --------------------------------------------------------  IN:    Enteral Tube Flush: 500 mL    Glucerna 1.5: 1000 mL    IV PiggyBack: 200 mL    Norepinephrine: 191.8 mL  Total IN: 1891.8 mL    OUT:    Incontinent per Collection Bag (mL): 950 mL    Voided (mL): 700 mL  Total OUT: 1650 mL    Total NET: 241.8 mL      26 Nov 2022 07:01  -  27 Nov 2022 06:08  --------------------------------------------------------  IN:    Dexmedetomidine: 14.2 mL    Enteral Tube Flush: 450 mL    Free Water: 50 mL    Glucerna 1.5: 900 mL    IV PiggyBack: 100 mL    IV PiggyBack: 100 mL    Norepinephrine: 82.8 mL  Total IN: 1697 mL    OUT:    Incontinent per Collection Bag (mL): 700 mL  Total OUT: 700 mL    Total NET: 997 mL          Lab Data                        7.5    9.41  )-----------( 193      ( 26 Nov 2022 06:09 )             24.9     11-26    135  |  100  |  27<H>  ----------------------------<  161<H>  5.7<H>   |  30  |  1.26    Ca    8.0<L>      26 Nov 2022 06:09  Phos  2.9     11-26    TPro  6.8  /  Alb  1.7<L>  /  TBili  0.2  /  DBili  x   /  AST  26  /  ALT  17  /  AlkPhos  142<H>  11-26            Review of Systems	      Objective     Physical Examination    heart s1s2  lung dec BS  head nc      Pertinent Lab findings & Imaging      Annalise:  NO   Adequate UO     I&O's Detail    25 Nov 2022 07:01  -  26 Nov 2022 07:00  --------------------------------------------------------  IN:    Enteral Tube Flush: 500 mL    Glucerna 1.5: 1000 mL    IV PiggyBack: 200 mL    Norepinephrine: 191.8 mL  Total IN: 1891.8 mL    OUT:    Incontinent per Collection Bag (mL): 950 mL    Voided (mL): 700 mL  Total OUT: 1650 mL    Total NET: 241.8 mL      26 Nov 2022 07:01  -  27 Nov 2022 06:08  --------------------------------------------------------  IN:    Dexmedetomidine: 14.2 mL    Enteral Tube Flush: 450 mL    Free Water: 50 mL    Glucerna 1.5: 900 mL    IV PiggyBack: 100 mL    IV PiggyBack: 100 mL    Norepinephrine: 82.8 mL  Total IN: 1697 mL    OUT:    Incontinent per Collection Bag (mL): 700 mL  Total OUT: 700 mL    Total NET: 997 mL               Discussed with:     Cultures:	        Radiology

## 2022-11-27 NOTE — PROGRESS NOTE ADULT - ASSESSMENT
77F with dementia, COPD with chronic respiratory failure s/p trach, cardiac arrest, CHH, quadriplegia, CVA, CKD, anemia, PEG tube, and multiple hospital admissions for respiratory distress presents to the ED BIBEMS from [facility] for diaphoresis and fever.       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

## 2022-11-27 NOTE — PROGRESS NOTE ADULT - ASSESSMENT
Pt is a 76 y/o F pmhx of Dementia, COPD, chronic respiratory failure requiring trach and peg, now vent dependent, cardiac arrest, quadriplegia, CVA, and CKD presented to  ED w/ fevers and abnormal labs. Found to have a elevated WBC w/ L shift, febrile and CXR w/ worsening R infiltrates. Admitted for septic shock in setting of suspected VAP. Sputum cultures positive for Acinetobacter baumannii and started on unasyn by ID given resistance to bactrim, and penams.     1. Septic shock secondary to VAP   2. VAP   3. Acute on chronic respiratory failure   4. Hyperkalemia   5. Anemia     Plan:     Neuro: Sedated on vent w/ precedex, titrate to RAAS of 0 - -1     CV: Septic shock resolved, off pressors, Midodrine 15mg Q8 for hypotension, will restart on Levo if persistently hypotensive. Maintain MAP >65.     Pulm: Acute on chronic respiratory failure in setting of VAP, remains on full vent support w/ Low TV ventilation using 4-6cc/kg of IBW for lung protective strategies, will titrate FiO2 to maintain SpO2>90%. Increased PEEP to 8 to increase ventilation and titrate down FiO2.     GI: Diet: NPO w/ tube feeds, protonix for GI PPX     Renal: No active issues, continue to monitor and avoid nephrotoxic meds.     Endo: Hyperkalemia, repeat labs to evaluate and will treat w/ lokalma and insulin if needed. Glucose <180, mag>2 for arrythmia suppression.     Heme: Heparin for DVT PPX     ID: Septic shock in setting of VAP, continue on Unasyn as per ID, continue to follow cultures and narrow abx.

## 2022-11-27 NOTE — PROGRESS NOTE ADULT - SUBJECTIVE AND OBJECTIVE BOX
Patient is a 77y old  Female who presents with a chief complaint of diaphoresis and fever (27 Nov 2022 07:29)        HPI:  77F with dementia, COPD with chronic respiratory failure s/p trach, cardiac arrest, CHH, quadriplegia, CVA, CKD, anemia, PEG tube, and multiple hospital admissions for respiratory distress presents to the ED BIBEMS from [facility] for diaphoresis and fever.   Patient unable to provide any history.  Patient was just discharged here from 11/12-11/16 for low grade fever, midline malfunction, leukocytosis, fever, concerning for sepsis possibly 2/2 unresolved VAP.  Was treated with meropenem.  Per notes from [facility], around 3am, patient was does "not look good" and was noted to be diaphoretic, tachypneic, and febrile to 100.9F.  Was sent here for further evaluation.  In the ED, patient was febrile to 101.5F and tachycardic to 112.  Labs showed leukocytosis of 16 (up from 6.5), hyperkalemia 5.4, ABG 7.59/36/273.  Patient is being readmitted for sepsis 2/2 presumably from VAP.   (21 Nov 2022 04:55)      SUBJECTIVE & OBJECTIVE: Pt seen and examined at bedside. hyperkalemia, K 6.0     PHYSICAL EXAM:  T(C): 36.7 (11-27-22 @ 08:30), Max: 37.3 (11-27-22 @ 00:00)  HR: 87 (11-27-22 @ 10:30) (49 - 123)  BP: 161/67 (11-27-22 @ 10:30) (85/43 - 198/151)  RR: 36 (11-27-22 @ 10:30) (13 - 48)  SpO2: 100% (11-27-22 @ 10:30) (79% - 100%)  Wt(kg): --   GENERAL: on trach/vent   HEAD:  Atraumatic, Normocephalic  NECK: Supple, No JVD  NERVOUS SYSTEM:  Alert   CHEST/LUNG: trach+   HEART: Regular rate and rhythm; No murmurs, rubs, or gallops  ABDOMEN: Soft, Nontender, Nondistended; Bowel sounds present  EXTREMITIES:  2+ Peripheral Pulses, No clubbing, cyanosis, or edema        MEDICATIONS  (STANDING):  albuterol    90 MICROgram(s) HFA Inhaler 2 Puff(s) Inhalation two times a day  ampicillin/sulbactam  IVPB 3 Gram(s) IV Intermittent every 6 hours  chlorhexidine 2% Cloths 1 Application(s) Topical daily  dexMEDEtomidine Infusion 0.5 MICROgram(s)/kG/Hr (7.14 mL/Hr) IV Continuous <Continuous>  dextrose 5%. 1000 milliLiter(s) (100 mL/Hr) IV Continuous <Continuous>  dextrose 5%. 1000 milliLiter(s) (50 mL/Hr) IV Continuous <Continuous>  dextrose 50% Injectable 25 Gram(s) IV Push once  dextrose 50% Injectable 12.5 Gram(s) IV Push once  dextrose 50% Injectable 25 Gram(s) IV Push once  glucagon  Injectable 1 milliGRAM(s) IntraMuscular once  heparin   Injectable 5000 Unit(s) SubCutaneous every 12 hours  insulin glargine Injectable (LANTUS) 8 Unit(s) SubCutaneous every morning  insulin lispro (ADMELOG) corrective regimen sliding scale   SubCutaneous every 6 hours  LORazepam     Tablet 1 milliGRAM(s) Oral every 4 hours  midodrine 15 milliGRAM(s) Oral every 8 hours  norepinephrine Infusion 0.05 MICROgram(s)/kG/Min (5.35 mL/Hr) IV Continuous <Continuous>  pantoprazole  Injectable 40 milliGRAM(s) IV Push daily  polyethylene glycol 3350 17 Gram(s) Oral every 12 hours  povidone iodine 10% Solution 1 Application(s) Topical daily  senna 2 Tablet(s) Oral at bedtime  sodium chloride 0.9%. 1000 milliLiter(s) (100 mL/Hr) IV Continuous <Continuous>  sodium zirconium cyclosilicate 10 Gram(s) Oral three times a day    MEDICATIONS  (PRN):  dextrose Oral Gel 15 Gram(s) Oral once PRN Blood Glucose LESS THAN 70 milliGRAM(s)/deciliter  LORazepam   Injectable 2 milliGRAM(s) IV Push every 6 hours PRN Agitation  sodium chloride 0.9% lock flush 10 milliLiter(s) IV Push every 1 hour PRN Pre/post blood products, medications, blood draw, and to maintain line patency      LABS:                        7.2    9.13  )-----------( 176      ( 27 Nov 2022 06:35 )             24.3     11-27    137  |  102  |  28<H>  ----------------------------<  117<H>  6.0<H>   |  31  |  1.17    Ca    8.0<L>      27 Nov 2022 06:35  Phos  2.9     11-26    TPro  6.4  /  Alb  1.7<L>  /  TBili  0.3  /  DBili  x   /  AST  23  /  ALT  15  /  AlkPhos  138<H>  11-27    PT/INR - ( 26 Nov 2022 06:09 )   PT: 14.0 sec;   INR: 1.21 ratio               CAPILLARY BLOOD GLUCOSE      POCT Blood Glucose.: 156 mg/dL (27 Nov 2022 06:38)  POCT Blood Glucose.: 171 mg/dL (26 Nov 2022 23:35)  POCT Blood Glucose.: 144 mg/dL (26 Nov 2022 18:11)  POCT Blood Glucose.: 152 mg/dL (26 Nov 2022 11:32)      CAPILLARY BLOOD GLUCOSE      POCT Blood Glucose.: 156 mg/dL (27 Nov 2022 06:38)  POCT Blood Glucose.: 171 mg/dL (26 Nov 2022 23:35)  POCT Blood Glucose.: 144 mg/dL (26 Nov 2022 18:11)  POCT Blood Glucose.: 152 mg/dL (26 Nov 2022 11:32)    CAPILLARY BLOOD GLUCOSE      POCT Blood Glucose.: 156 mg/dL (27 Nov 2022 06:38)            RECENT CULTURES:      RADIOLOGY & ADDITIONAL TESTS:                        DVT/GI ppx  Discussed with pt @ bedside

## 2022-11-27 NOTE — PROGRESS NOTE ADULT - SUBJECTIVE AND OBJECTIVE BOX
BREA BECKHAM     [facility]    Allergies    codeine (Hives)    Intolerances        PAST MEDICAL & SURGICAL HISTORY:  Dementia of frontal lobe type      Aphasic stroke      Diabetes mellitus      Respiratory failure      Hypertension      GERD (gastroesophageal reflux disease)      Constipation      Respiratory failure      CVA (cerebral vascular accident)      HTN (hypertension)      DM (diabetes mellitus)      Advanced dementia      COVID-19 virus detected      Quadriplegia      Pneumonia      Type II diabetes mellitus      Hx of appendectomy      Gastrostomy in place      Tracheostomy in place      Tracheostomy tube present      Feeding by G-tube          FAMILY HISTORY:  No pertinent family history in first degree relatives        Home Medications:  Admelog 100 units/mL injectable solution: injectable every 6 hours SS (21 Nov 2022 05:08)  albuterol 90 mcg/inh inhalation aerosol with adapter: 2  inhaled every 6 hours (21 Nov 2022 04:24)  Bacid (LAC) oral tablet: 2 tab(s) by gastrostomy tube once a day (21 Nov 2022 04:24)  bacitracin 500 units/g topical ointment: Apply topically to affected area once a day to knees (21 Nov 2022 04:24)  chlorhexidine 0.12% mucous membrane liquid: 15 milliliter(s) mucous membrane 2 times a day (21 Nov 2022 04:24)  Dakins Full Strength 0.5% topical solution: Apply topically to affected area 2 times a day then apply santyl and calcium alginate (21 Nov 2022 04:24)  Eucerin topical cream: Apply topically to affected area once a day bilateral feet (21 Nov 2022 04:24)  ferrous sulfate 325 mg (65 mg elemental iron) oral tablet: 1 tab(s) by gastrostomy tube once a day (21 Nov 2022 04:24)  insulin glargine 100 units/mL subcutaneous solution: 8 unit(s) subcutaneous once a day (in the morning) (21 Nov 2022 04:24)  ipratropium-albuterol 0.5 mg-2.5 mg/3 mL inhalation solution: 3 milliliter(s) inhaled every 6 hours (21 Nov 2022 04:24)  LORazepam 1 mg oral tablet: 1 tab(s) by gastrostomy tube every 4 hours (21 Nov 2022 04:24)  midodrine 10 mg oral tablet: 1 tab(s) by gastrostomy tube every 8 hours (21 Nov 2022 04:24)  MiraLax oral powder for reconstitution: orally once a day (at bedtime) (21 Nov 2022 05:08)  mulivitamin: by gastrostomy tube once a day (21 Nov 2022 04:24)  nystatin 100,000 units/g topical powder: 1 application topically 3 times a day (21 Nov 2022 04:24)  omeprazole 40 mg oral delayed release capsule: 1 cap(s) by gastrostomy tube 2 times a day (21 Nov 2022 04:24)  polyethylene glycol 3350 oral powder for reconstitution: 17 gram(s) by gastrostomy tube every 12 hours (21 Nov 2022 04:24)  senna 8.6 mg oral tablet: 3 tab(s) by gastrostomy tube once a day (at bedtime) (21 Nov 2022 04:24)  simethicone 80 mg oral tablet, chewable: 1 tab(s) by gastrostomy tube every 6 hours (21 Nov 2022 04:24)  sucralfate 1 g/10 mL oral suspension: 10 milliliter(s) g-tube 4 times a day (before meals and at bedtime) (21 Nov 2022 04:24)  Tylenol 325 mg oral tablet: 2 tab(s) orally every 6 hours, As Needed prior to dressing change (21 Nov 2022 04:24)      MEDICATIONS  (STANDING):  albuterol    90 MICROgram(s) HFA Inhaler 2 Puff(s) Inhalation two times a day  ampicillin/sulbactam  IVPB 3 Gram(s) IV Intermittent every 6 hours  chlorhexidine 2% Cloths 1 Application(s) Topical daily  dexMEDEtomidine Infusion 0.5 MICROgram(s)/kG/Hr (7.14 mL/Hr) IV Continuous <Continuous>  dextrose 5%. 1000 milliLiter(s) (50 mL/Hr) IV Continuous <Continuous>  dextrose 5%. 1000 milliLiter(s) (100 mL/Hr) IV Continuous <Continuous>  dextrose 50% Injectable 25 Gram(s) IV Push once  dextrose 50% Injectable 12.5 Gram(s) IV Push once  dextrose 50% Injectable 25 Gram(s) IV Push once  glucagon  Injectable 1 milliGRAM(s) IntraMuscular once  heparin   Injectable 5000 Unit(s) SubCutaneous every 12 hours  insulin glargine Injectable (LANTUS) 8 Unit(s) SubCutaneous every morning  insulin lispro (ADMELOG) corrective regimen sliding scale   SubCutaneous every 6 hours  LORazepam     Tablet 1 milliGRAM(s) Oral every 4 hours  midodrine 15 milliGRAM(s) Oral every 8 hours  norepinephrine Infusion 0.05 MICROgram(s)/kG/Min (5.35 mL/Hr) IV Continuous <Continuous>  pantoprazole  Injectable 40 milliGRAM(s) IV Push daily  polyethylene glycol 3350 17 Gram(s) Oral every 12 hours  povidone iodine 10% Solution 1 Application(s) Topical daily  senna 2 Tablet(s) Oral at bedtime  sodium chloride 0.9%. 1000 milliLiter(s) (100 mL/Hr) IV Continuous <Continuous>    MEDICATIONS  (PRN):  dextrose Oral Gel 15 Gram(s) Oral once PRN Blood Glucose LESS THAN 70 milliGRAM(s)/deciliter  LORazepam   Injectable 2 milliGRAM(s) IV Push every 6 hours PRN Agitation  sodium chloride 0.9% lock flush 10 milliLiter(s) IV Push every 1 hour PRN Pre/post blood products, medications, blood draw, and to maintain line patency      Diet, NPO with Tube Feed:   Tube Feeding Modality: Gastrostomy  Glucerna 1.5 Horacio  Total Volume for 24 Hours (mL): 1000  Continuous  Starting Tube Feed Rate mL per Hour: 10  Increase Tube Feed Rate by (mL): 10     Every 10 hours  Until Goal Tube Feed Rate (mL per Hour): 50  Tube Feed Duration (in Hours): 20  Tube Feed Start Time: 17:00  Free Water Flush  Pump   Rate (mL per Hour): 25   Frequency: Every Hour    Duration (Hours): 24 (11-21-22 @ 05:10) [Active]          Vital Signs Last 24 Hrs  T(C): 36.2 (27 Nov 2022 04:00), Max: 37.3 (27 Nov 2022 00:00)  T(F): 97.2 (27 Nov 2022 04:00), Max: 99.2 (27 Nov 2022 00:00)  HR: 103 (27 Nov 2022 06:45) (49 - 123)  BP: 157/85 (27 Nov 2022 06:45) (85/43 - 198/151)  BP(mean): 103 (27 Nov 2022 06:45) (56 - 173)  RR: 39 (27 Nov 2022 06:45) (13 - 48)  SpO2: 94% (27 Nov 2022 06:45) (79% - 100%)    Parameters below as of 26 Nov 2022 22:30  Patient On (Oxygen Delivery Method): ventilator    O2 Concentration (%): 100      11-26-22 @ 07:01  -  11-27-22 @ 07:00  --------------------------------------------------------  IN: 2436.1 mL / OUT: 700 mL / NET: 1736.1 mL        Mode: AC/ CMV (Assist Control/ Continuous Mandatory Ventilation), RR (machine): 18, TV (machine): 350, FiO2: 75, PEEP: 5, ITime: 1, MAP: 15, PIP: 38      LABS:                        7.2    9.13  )-----------( 176      ( 27 Nov 2022 06:35 )             24.3     11-27    137  |  102  |  28<H>  ----------------------------<  117<H>  6.0<H>   |  31  |  1.17    Ca    8.0<L>      27 Nov 2022 06:35  Phos  2.9     11-26    TPro  6.4  /  Alb  1.7<L>  /  TBili  0.3  /  DBili  x   /  AST  23  /  ALT  15  /  AlkPhos  138<H>  11-27    PT/INR - ( 26 Nov 2022 06:09 )   PT: 14.0 sec;   INR: 1.21 ratio                   WBC:  WBC Count: 9.13 K/uL (11-27 @ 06:35)  WBC Count: 9.41 K/uL (11-26 @ 06:09)  WBC Count: 9.01 K/uL (11-25 @ 05:54)  WBC Count: 12.15 K/uL (11-24 @ 05:30)  WBC Count: 8.82 K/uL (11-23 @ 16:08)      MICROBIOLOGY:  RECENT CULTURES:  11-21 Trach Asp Tracheal Aspirate Acinetobacter baumannii/nosocom group (Carbapenem Resistant)   Moderate polymorphonuclear leukocytes per low power field  No Squamous epithelial cells per low power field  No organisms seen   Moderate Acinetobacter baumannii/nosocom group (Carbapenem Resistant)  Normal Respiratory Elvira present    11-21 Clean Catch Clean Catch (Midstream) XXXX XXXX   <10,000 CFU/mL Normal Urogenital Elvira    11-21 .Blood Blood-Peripheral XXXX XXXX   No Growth Final                PT/INR - ( 26 Nov 2022 06:09 )   PT: 14.0 sec;   INR: 1.21 ratio             Sodium:  Sodium, Serum: 137 mmol/L (11-27 @ 06:35)  Sodium, Serum: 135 mmol/L (11-26 @ 06:09)  Sodium, Serum: 131 mmol/L (11-25 @ 05:54)  Sodium, Serum: 130 mmol/L (11-24 @ 05:30)      1.17 mg/dL 11-27 @ 06:35  1.26 mg/dL 11-26 @ 06:09  1.19 mg/dL 11-25 @ 05:54  1.13 mg/dL 11-24 @ 05:30      Hemoglobin:  Hemoglobin: 7.2 g/dL (11-27 @ 06:35)  Hemoglobin: 7.5 g/dL (11-26 @ 06:09)  Hemoglobin: 7.4 g/dL (11-25 @ 05:54)  Hemoglobin: 7.7 g/dL (11-24 @ 05:30)  Hemoglobin: 7.7 g/dL (11-23 @ 16:08)      Platelets: Platelet Count - Automated: 176 K/uL (11-27 @ 06:35)  Platelet Count - Automated: 193 K/uL (11-26 @ 06:09)  Platelet Count - Automated: 197 K/uL (11-25 @ 05:54)  Platelet Count - Automated: 199 K/uL (11-24 @ 05:30)  Platelet Count - Automated: 181 K/uL (11-23 @ 16:08)      LIVER FUNCTIONS - ( 27 Nov 2022 06:35 )  Alb: 1.7 g/dL / Pro: 6.4 g/dL / ALK PHOS: 138 U/L / ALT: 15 U/L DA / AST: 23 U/L / GGT: x                 RADIOLOGY & ADDITIONAL STUDIES:      MICROBIOLOGY:  RECENT CULTURES:  11-21 Trach Asp Tracheal Aspirate Acinetobacter baumannii/nosocom group (Carbapenem Resistant)   Moderate polymorphonuclear leukocytes per low power field  No Squamous epithelial cells per low power field  No organisms seen   Moderate Acinetobacter baumannii/nosocom group (Carbapenem Resistant)  Normal Respiratory Elvira present    11-21 Clean Catch Clean Catch (Midstream) XXXX XXXX   <10,000 CFU/mL Normal Urogenital Elvira    11-21 .Blood Blood-Peripheral XXXX XXXX   No Growth Final

## 2022-11-28 LAB
ALBUMIN SERPL ELPH-MCNC: 1.7 G/DL — LOW (ref 3.3–5)
ALP SERPL-CCNC: 119 U/L — SIGNIFICANT CHANGE UP (ref 30–120)
ALT FLD-CCNC: 15 U/L DA — SIGNIFICANT CHANGE UP (ref 10–60)
ANION GAP SERPL CALC-SCNC: 5 MMOL/L — SIGNIFICANT CHANGE UP (ref 5–17)
AST SERPL-CCNC: 24 U/L — SIGNIFICANT CHANGE UP (ref 10–40)
BILIRUB SERPL-MCNC: 1 MG/DL — SIGNIFICANT CHANGE UP (ref 0.2–1.2)
BLD GP AB SCN SERPL QL: SIGNIFICANT CHANGE UP
BUN SERPL-MCNC: 29 MG/DL — HIGH (ref 7–23)
CALCIUM SERPL-MCNC: 8 MG/DL — LOW (ref 8.4–10.5)
CHLORIDE SERPL-SCNC: 102 MMOL/L — SIGNIFICANT CHANGE UP (ref 96–108)
CO2 SERPL-SCNC: 30 MMOL/L — SIGNIFICANT CHANGE UP (ref 22–31)
CREAT SERPL-MCNC: 1.2 MG/DL — SIGNIFICANT CHANGE UP (ref 0.5–1.3)
EGFR: 46 ML/MIN/1.73M2 — LOW
GLUCOSE SERPL-MCNC: 153 MG/DL — HIGH (ref 70–99)
HCT VFR BLD CALC: 27.3 % — LOW (ref 34.5–45)
HGB BLD-MCNC: 8.7 G/DL — LOW (ref 11.5–15.5)
MCHC RBC-ENTMCNC: 28.2 PG — SIGNIFICANT CHANGE UP (ref 27–34)
MCHC RBC-ENTMCNC: 31.9 GM/DL — LOW (ref 32–36)
MCV RBC AUTO: 88.3 FL — SIGNIFICANT CHANGE UP (ref 80–100)
NRBC # BLD: 0 /100 WBCS — SIGNIFICANT CHANGE UP (ref 0–0)
PLATELET # BLD AUTO: 167 K/UL — SIGNIFICANT CHANGE UP (ref 150–400)
POTASSIUM SERPL-MCNC: 5.3 MMOL/L — SIGNIFICANT CHANGE UP (ref 3.5–5.3)
POTASSIUM SERPL-SCNC: 5.3 MMOL/L — SIGNIFICANT CHANGE UP (ref 3.5–5.3)
PROT SERPL-MCNC: 6.8 G/DL — SIGNIFICANT CHANGE UP (ref 6–8.3)
RBC # BLD: 3.09 M/UL — LOW (ref 3.8–5.2)
RBC # FLD: 16.9 % — HIGH (ref 10.3–14.5)
SODIUM SERPL-SCNC: 137 MMOL/L — SIGNIFICANT CHANGE UP (ref 135–145)
WBC # BLD: 11.08 K/UL — HIGH (ref 3.8–10.5)
WBC # FLD AUTO: 11.08 K/UL — HIGH (ref 3.8–10.5)

## 2022-11-28 PROCEDURE — 99232 SBSQ HOSP IP/OBS MODERATE 35: CPT

## 2022-11-28 RX ADMIN — Medication 2 MILLIGRAM(S): at 15:39

## 2022-11-28 RX ADMIN — PANTOPRAZOLE SODIUM 40 MILLIGRAM(S): 20 TABLET, DELAYED RELEASE ORAL at 11:22

## 2022-11-28 RX ADMIN — Medication 2: at 07:22

## 2022-11-28 RX ADMIN — SODIUM ZIRCONIUM CYCLOSILICATE 10 GRAM(S): 10 POWDER, FOR SUSPENSION ORAL at 13:51

## 2022-11-28 RX ADMIN — INSULIN GLARGINE 8 UNIT(S): 100 INJECTION, SOLUTION SUBCUTANEOUS at 07:23

## 2022-11-28 RX ADMIN — Medication 2 MILLIGRAM(S): at 22:25

## 2022-11-28 RX ADMIN — Medication 1 MILLIGRAM(S): at 17:36

## 2022-11-28 RX ADMIN — Medication 2: at 17:36

## 2022-11-28 RX ADMIN — SODIUM ZIRCONIUM CYCLOSILICATE 10 GRAM(S): 10 POWDER, FOR SUSPENSION ORAL at 06:39

## 2022-11-28 RX ADMIN — ALBUTEROL 2 PUFF(S): 90 AEROSOL, METERED ORAL at 21:06

## 2022-11-28 RX ADMIN — ALBUTEROL 2 PUFF(S): 90 AEROSOL, METERED ORAL at 06:30

## 2022-11-28 RX ADMIN — MIDODRINE HYDROCHLORIDE 15 MILLIGRAM(S): 2.5 TABLET ORAL at 06:37

## 2022-11-28 RX ADMIN — SODIUM ZIRCONIUM CYCLOSILICATE 10 GRAM(S): 10 POWDER, FOR SUSPENSION ORAL at 21:39

## 2022-11-28 RX ADMIN — Medication 1 MILLIGRAM(S): at 13:39

## 2022-11-28 RX ADMIN — HEPARIN SODIUM 5000 UNIT(S): 5000 INJECTION INTRAVENOUS; SUBCUTANEOUS at 06:38

## 2022-11-28 RX ADMIN — Medication 1 MILLIGRAM(S): at 06:38

## 2022-11-28 RX ADMIN — MIDODRINE HYDROCHLORIDE 15 MILLIGRAM(S): 2.5 TABLET ORAL at 21:40

## 2022-11-28 RX ADMIN — Medication 1 MILLIGRAM(S): at 10:44

## 2022-11-28 RX ADMIN — AMPICILLIN SODIUM AND SULBACTAM SODIUM 200 GRAM(S): 250; 125 INJECTION, POWDER, FOR SUSPENSION INTRAMUSCULAR; INTRAVENOUS at 06:38

## 2022-11-28 RX ADMIN — Medication 1 APPLICATION(S): at 11:35

## 2022-11-28 RX ADMIN — AMPICILLIN SODIUM AND SULBACTAM SODIUM 200 GRAM(S): 250; 125 INJECTION, POWDER, FOR SUSPENSION INTRAMUSCULAR; INTRAVENOUS at 17:05

## 2022-11-28 RX ADMIN — Medication 1 MILLIGRAM(S): at 02:16

## 2022-11-28 RX ADMIN — AMPICILLIN SODIUM AND SULBACTAM SODIUM 200 GRAM(S): 250; 125 INJECTION, POWDER, FOR SUSPENSION INTRAMUSCULAR; INTRAVENOUS at 11:21

## 2022-11-28 RX ADMIN — HEPARIN SODIUM 5000 UNIT(S): 5000 INJECTION INTRAVENOUS; SUBCUTANEOUS at 17:06

## 2022-11-28 RX ADMIN — MIDODRINE HYDROCHLORIDE 15 MILLIGRAM(S): 2.5 TABLET ORAL at 13:40

## 2022-11-28 RX ADMIN — AMPICILLIN SODIUM AND SULBACTAM SODIUM 200 GRAM(S): 250; 125 INJECTION, POWDER, FOR SUSPENSION INTRAMUSCULAR; INTRAVENOUS at 00:21

## 2022-11-28 RX ADMIN — Medication 1 MILLIGRAM(S): at 21:40

## 2022-11-28 RX ADMIN — CHLORHEXIDINE GLUCONATE 1 APPLICATION(S): 213 SOLUTION TOPICAL at 06:39

## 2022-11-28 RX ADMIN — Medication 2: at 11:28

## 2022-11-28 NOTE — PROGRESS NOTE ADULT - SUBJECTIVE AND OBJECTIVE BOX
Chief Complaint: Respiratory distress    Interval Events: No events overnight.    Review of Systems:  Unable to obtain    Physical Exam:  Vital Signs Last 24 Hrs  T(C): 37.3 (28 Nov 2022 04:03), Max: 37.3 (28 Nov 2022 00:03)  T(F): 99.1 (28 Nov 2022 04:03), Max: 99.2 (28 Nov 2022 00:03)  HR: 76 (28 Nov 2022 07:00) (54 - 107)  BP: 121/55 (28 Nov 2022 07:00) (83/45 - 197/74)  BP(mean): 74 (28 Nov 2022 07:00) (57 - 118)  RR: 35 (28 Nov 2022 07:00) (15 - 42)  SpO2: 96% (28 Nov 2022 07:00) (87% - 100%)  Parameters below as of 28 Nov 2022 07:00  Patient On (Oxygen Delivery Method): ventilator  O2 Concentration (%): 90  General: Unresponsive  HEENT: MMM  Neck: No JVD, no carotid bruit  Lungs: CTAB  CV: RRR, nl S1/S2, no M/R/G  Abdomen: S/NT/ND, +BS  Extremities: 1+ LE edema, no cyanosis  Neuro: AAOx0  Skin: No rash    Labs:    11-28    137  |  102  |  29<H>  ----------------------------<  153<H>  5.3   |  30  |  1.20    Ca    8.0<L>      28 Nov 2022 07:08  Phos  3.3     11-27  Mg     2.4     11-27    TPro  6.8  /  Alb  1.7<L>  /  TBili  1.0  /  DBili  x   /  AST  24  /  ALT  15  /  AlkPhos  119  11-28                        8.7    11.08 )-----------( 167      ( 28 Nov 2022 07:08 )             27.3       ECG/Telemetry: Sinus rhythm

## 2022-11-28 NOTE — PROGRESS NOTE ADULT - SUBJECTIVE AND OBJECTIVE BOX
INTERVAL HPI/OVERNIGHT EVENTS:  No new overnight event.  No N/V/D.  Tolerating diet via peg some minimal leakage from the tube    Allergies    codeine (Hives)    Intolerances    General:  No wt loss, fevers, chills, night sweats, fatigue,   Eyes:  Good vision, no reported pain  ENT:  No sore throat, pain, runny nose, dysphagia  CV:  No pain, palpitations, hypo/hypertension  Resp:  No dyspnea, cough, tachypnea, wheezing  GI:  No pain, No nausea, No vomiting, No diarrhea, No constipation, No weight loss, No fever, No pruritis, No rectal bleeding, No tarry stools, No dysphagia,  :  No pain, bleeding, incontinence, nocturia  Muscle:  No pain, weakness  Neuro:  No weakness, tingling, memory problems  Psych:  No fatigue, insomnia, mood problems, depression  Endocrine:  No polyuria, polydipsia, cold/heat intolerance  Heme:  No petechiae, ecchymosis, easy bruisability  Skin:  No rash, tattoos, scars, edema      PHYSICAL EXAM:   Vital Signs:  Vital Signs Last 24 Hrs  T(C): 37.4 (2022 15:38), Max: 38 (2022 12:51)  T(F): 99.3 (2022 15:38), Max: 100.4 (2022 12:51)  HR: 70 (2022 15:00) (54 - 99)  BP: 116/59 (2022 15:00) (83/45 - 130/53)  BP(mean): 77 (2022 15:00) (57 - 81)  RR: 24 (2022 15:00) (15 - 36)  SpO2: 100% (2022 15:00) (95% - 100%)    Parameters below as of 2022 15:00  Patient On (Oxygen Delivery Method): ventilator    O2 Concentration (%): 80  Daily     Daily Weight in k.3 (2022 04:03)I&O's Summary    2022 07:01  -  2022 07:00  --------------------------------------------------------  IN: 2303.5 mL / OUT: 1050 mL / NET: 1253.5 mL    2022 07:01  -  2022 15:52  --------------------------------------------------------  IN: 675 mL / OUT: 0 mL / NET: 675 mL        GENERAL:  Appears stated age, well-groomed, well-nourished, no distress  HEENT:  NC/AT,  conjunctivae clear and pink, no thyromegaly, nodules, adenopathy, no JVD, sclera -anicteric  CHEST:  Full & symmetric excursion, no increased effort, breath sounds clear  HEART:  Regular rhythm, S1, S2, no murmur/rub/S3/S4, no abdominal bruit, no edema  ABDOMEN:  Soft, non-tender, non-distended, normoactive bowel sounds,  no masses ,no hepato-splenomegaly, no signs of chronic liver disease  EXTEREMITIES:  no cyanosis,clubbing or edema  SKIN:  No rash/erythema/ecchymoses/petechiae/wounds/abscess/warm/dry  NEURO:  Alert, oriented, no asterixis, no tremor, no encephalopathy      LABS:                        8.7     )-----------( 167      ( 2022 07:08 )             27.3     -    137  |  102  |  29<H>  ----------------------------<  153<H>  5.3   |  30  |  1.20    Ca    8.0<L>      2022 07:08  Phos  3.3       Mg     2.4         TPro  6.8  /  Alb  1.7<L>  /  TBili  1.0  /  DBili  x   /  AST  24  /  ALT  15  /  AlkPhos  119          amylase   lipase  RADIOLOGY & ADDITIONAL TESTS:

## 2022-11-28 NOTE — PROGRESS NOTE ADULT - ASSESSMENT
77F with dementia, COPD with chronic respiratory failure s/p trach, cardiac arrest, CHH, quadriplegia, CVA, CKD, anemia, PEG tube, and multiple hospital admissions for respiratory distress presents to the ED BIBEMS from [facility] for diaphoresis and fever.       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

## 2022-11-28 NOTE — PROGRESS NOTE ADULT - SUBJECTIVE AND OBJECTIVE BOX
Patient is a 77y old  Female who presents with a chief complaint of diaphoresis and fever (28 Nov 2022 15:49)    BRIEF HOSPITAL COURSE:   76 y/o F pmhx of Dementia, COPD, chronic respiratory failure requiring trach and peg, now vent dependent, cardiac arrest, quadriplegia, CVA, and CKD presented to  ED w/ fevers and abnormal labs. Found to have a elevated WBC w/ L shift, febrile and CXR w/ worsening R infiltrates. Admitted for septic shock in setting of suspected VAP. Sputum cultures positive for Acinetobacter baumannii.    Events last 24 hours:   -Weaned off Levophed infusion this evening, MAP >65.  -On full vent support, remains w/ high ventilatory requirements, received on +8/80% FiO2.  -Afebrile.     PAST MEDICAL & SURGICAL HISTORY:  Dementia of frontal lobe type  Aphasic stroke  Diabetes mellitus  Respiratory failure  Hypertension  GERD (gastroesophageal reflux disease)  Constipation  Respiratory failure  CVA (cerebral vascular accident)  HTN (hypertension)  DM (diabetes mellitus)  Advanced dementia  COVID-19 virus detected  Quadriplegia  Pneumonia  Type II diabetes mellitus  Hx of appendectomy  Gastrostomy in place  Tracheostomy in place  Tracheostomy tube present  Feeding by G-tube    Review of Systems:  Due to patient baseline mentation, subjective information was not able to be obtained from the patient. History was obtained, to the extent possible, from review of the chart and collateral sources of information.     Medications:  ampicillin/sulbactam  IVPB 3 Gram(s) IV Intermittent every 6 hours  midodrine 15 milliGRAM(s) Oral every 8 hours  norepinephrine Infusion 0.05 MICROgram(s)/kG/Min IV Continuous <Continuous>  albuterol    90 MICROgram(s) HFA Inhaler 2 Puff(s) Inhalation two times a day  dexMEDEtomidine Infusion 0.5 MICROgram(s)/kG/Hr IV Continuous <Continuous>  LORazepam     Tablet 1 milliGRAM(s) Oral every 4 hours  LORazepam   Injectable 2 milliGRAM(s) IV Push every 6 hours PRN  heparin   Injectable 5000 Unit(s) SubCutaneous every 12 hours  pantoprazole  Injectable 40 milliGRAM(s) IV Push daily  polyethylene glycol 3350 17 Gram(s) Oral every 12 hours  senna 2 Tablet(s) Oral at bedtime  dextrose 50% Injectable 25 Gram(s) IV Push once  dextrose 50% Injectable 12.5 Gram(s) IV Push once  dextrose 50% Injectable 25 Gram(s) IV Push once  dextrose Oral Gel 15 Gram(s) Oral once PRN  glucagon  Injectable 1 milliGRAM(s) IntraMuscular once  insulin glargine Injectable (LANTUS) 8 Unit(s) SubCutaneous every morning  insulin lispro (ADMELOG) corrective regimen sliding scale   SubCutaneous every 6 hours  dextrose 5%. 1000 milliLiter(s) IV Continuous <Continuous>  dextrose 5%. 1000 milliLiter(s) IV Continuous <Continuous>  sodium chloride 0.9% lock flush 10 milliLiter(s) IV Push every 1 hour PRN  sodium chloride 0.9%. 1000 milliLiter(s) IV Continuous <Continuous>  chlorhexidine 2% Cloths 1 Application(s) Topical daily  povidone iodine 10% Solution 1 Application(s) Topical daily  sodium zirconium cyclosilicate 10 Gram(s) Oral three times a day    Mode: AC/ CMV (Assist Control/ Continuous Mandatory Ventilation)  RR (machine): 18  TV (machine): 350  FiO2: 80  PEEP: 8  ITime: 1  MAP: 20  PIP: 51    ICU Vital Signs Last 24 Hrs  T(C): 37.2 (28 Nov 2022 20:06), Max: 38 (28 Nov 2022 12:51)  T(F): 99 (28 Nov 2022 20:06), Max: 100.4 (28 Nov 2022 12:51)  HR: 76 (28 Nov 2022 19:00) (59 - 83)  BP: 113/57 (28 Nov 2022 19:00) (91/54 - 130/53)  BP(mean): 75 (28 Nov 2022 19:00) (62 - 81)  ABP: --  ABP(mean): --  RR: 28 (28 Nov 2022 19:00) (15 - 36)  SpO2: 97% (28 Nov 2022 19:00) (95% - 100%)  O2 Parameters below as of 28 Nov 2022 19:00  Patient On (Oxygen Delivery Method): ventilator  O2 Concentration (%): 80    I&O's Detail  27 Nov 2022 07:01  -  28 Nov 2022 07:00  --------------------------------------------------------  IN:    Enteral Tube Flush: 525 mL    Free Water: 120 mL    Glucerna 1.5: 1050 mL    IV PiggyBack: 100 mL    IV PiggyBack: 200 mL    Norepinephrine: 8.5 mL    PRBCs (Packed Red Blood Cells): 300 mL  Total IN: 2303.5 mL  OUT:    Dexmedetomidine: 0 mL    Incontinent per Collection Bag (mL): 1050 mL    sodium chloride 0.9%: 0 mL  Total OUT: 1050 mL  Total NET: 1253.5 mL    28 Nov 2022 07:01  -  28 Nov 2022 20:51  --------------------------------------------------------  IN:    Enteral Tube Flush: 275 mL    Glucerna 1.5: 600 mL    IV PiggyBack: 200 mL  Total IN: 1075 mL  OUT:  Total OUT: 0 mL  Total NET: 1075 mL    LABS:                      8.7    11.08 )-----------( 167      ( 28 Nov 2022 07:08 )             27.3     11-28  137  |  102  |  29<H>  ----------------------------<  153<H>  5.3   |  30  |  1.20  Ca    8.0<L>      28 Nov 2022 07:08  Phos  3.3     11-27  Mg     2.4     11-27  TPro  6.8  /  Alb  1.7<L>  /  TBili  1.0  /  DBili  x   /  AST  24  /  ALT  15  /  AlkPhos  119  11-28    CAPILLARY BLOOD GLUCOSE  POCT Blood Glucose.: 186 mg/dL (28 Nov 2022 17:35)    CULTURES:      Physical Examination:  General: Cachetic ill-appearing adult female.   HEENT: PERRL.  NECK: +Trach.   PULM: Course BS at bases b/l.   CVS: s1/s2.  ABD: Soft, nondistended, nontender, normoactive bowel sounds.  EXT: No edema, nontender. Contracted.   SKIN: Warm.    RADIOLOGY:   < from: US Duplex Venous Lower Ext Complete, Bilateral (11.26.22 @ 23:01) >  ACC: [id number] EXAM:  US DPLX LWR EXT VEINS COMPL BI                        PROCEDURE DATE:  11/26/2022    IMPRESSION:  No evidence of deep venous thrombosis in either lower extremity.    < from: Xray Chest 1 View- PORTABLE-Routine (Xray Chest 1 View- PORTABLE-Routine in AM.) (11.26.22 @ 07:19) >  ACC: [id number] EXAM:  XR CHEST PORTABLE ROUTINE 1V                        PROCEDURE DATE:  11/26/2022   Impression:  Limited examination dueto patient's hand position.  Pulmonary venous congestion and/or pneumonia with bilateral effusions.      76 y/o F pmhx of Dementia, COPD, chronic respiratory failure requiring trach and peg, now vent dependent, cardiac arrest, quadriplegia, CVA, and CKD presented to  ED w/ fevers and abnormal labs. Found to have a elevated WBC w/ L shift, febrile and CXR w/ worsening R infiltrates. Admitted for septic shock in setting of suspected VAP. Sputum cultures positive for Acinetobacter baumannii.  Assessment:  1. Septic Shock (resolved)   2. Acute on Chronic Hypoxic Respiratory Failure  3. VAP    Plan:  NEURO:  -Ativan q4h w/ PRN Ativan for agitation. Will use Precedex infusion PRN for vent dyssynchrony / agitation.     CV:  -Weaned off vasopressors, MAP >65. Low threshold ot reinstitute vasopressors to maintain goal MAP >65. Midodrine 15mg q8h.   -Keep K~4 and Mg>2 for optimal arrhythmia suppression.  -Card following.    RESP:  -Patient currently on Full vent support, remains w/ high ventilatory requirements -  +8/80% --> actively wean to 70% FiO2.   -titrate settings to maintain SaO2 >90%, or pH >7.25  -consider low tidal volume ventilation strategy w/ goal Tv 4-6 cc/kg of ideal body weight  -plateu pressure goal <30  -Peridex oral care  -aggressive pulmonary toilet    RENAL:  -Renal function currently WNL.  -trend lytes/Scr daily with BMP  -I's and O's, goal UOP 0.5 cc/kg/hr  -renal dose meds and avoid nephrotoxins   -Nephrology following. Lokelma TID.     GI:  -TF.  -Protonix QD.     ENDO:  -Aggressive glycemic control to limit FS glucose to <180mg/dl. ISS, Lantus.    ID:  -Afebrile. SputumCx w/ Acinetobacter baumannii. ID following, on Unasyn course.     HEME:  -DVT ppx w/ SQH.     DISPO: Full Code.     CRITICAL CARE TIME SPENT:  38 minutes of critical care time spent providing medical care for patient's acute illness/conditions that impairs at least one vital organ system and/or poses a high risk of imminent or life threatening deterioration in the patient's condition. It includes time spent evaluating and treating the patient's acute illness as well as time spent reviewing labs, radiology, discussing goals of care with patient and/or patient's family, and discussing the case with a multidisciplinary team, including the eICU, in an effort to prevent further life threatening deterioration or end organ damage. This time is independent of any procedures performed.  Patient is a 77y old  Female who presents with a chief complaint of diaphoresis and fever (28 Nov 2022 15:49)    BRIEF HOSPITAL COURSE:   78 y/o F pmhx of Dementia, COPD, chronic respiratory failure requiring trach and peg, now vent dependent, cardiac arrest, quadriplegia, CVA, and CKD presented to  ED w/ fevers and abnormal labs. Found to have a elevated WBC w/ L shift, febrile and CXR w/ worsening R infiltrates. Admitted for septic shock in setting of suspected VAP. Sputum cultures positive for Acinetobacter baumannii.    Events last 24 hours:   -Weaned off Levophed infusion this evening, MAP >65.  -Desaturates very quickly w/ little movement, TLC. On full vent support, remains w/ high ventilatory requirements; previously on 100% FiO2 during the day, received on +8/80% FiO2.  -Afebrile.     PAST MEDICAL & SURGICAL HISTORY:  Dementia of frontal lobe type  Aphasic stroke  Diabetes mellitus  Respiratory failure  Hypertension  GERD (gastroesophageal reflux disease)  Constipation  Respiratory failure  CVA (cerebral vascular accident)  HTN (hypertension)  DM (diabetes mellitus)  Advanced dementia  COVID-19 virus detected  Quadriplegia  Pneumonia  Type II diabetes mellitus  Hx of appendectomy  Gastrostomy in place  Tracheostomy in place  Tracheostomy tube present  Feeding by G-tube    Review of Systems:  Due to patient baseline mentation, subjective information was not able to be obtained from the patient. History was obtained, to the extent possible, from review of the chart and collateral sources of information.     Medications:  ampicillin/sulbactam  IVPB 3 Gram(s) IV Intermittent every 6 hours  midodrine 15 milliGRAM(s) Oral every 8 hours  norepinephrine Infusion 0.05 MICROgram(s)/kG/Min IV Continuous <Continuous>  albuterol    90 MICROgram(s) HFA Inhaler 2 Puff(s) Inhalation two times a day  dexMEDEtomidine Infusion 0.5 MICROgram(s)/kG/Hr IV Continuous <Continuous>  LORazepam     Tablet 1 milliGRAM(s) Oral every 4 hours  LORazepam   Injectable 2 milliGRAM(s) IV Push every 6 hours PRN  heparin   Injectable 5000 Unit(s) SubCutaneous every 12 hours  pantoprazole  Injectable 40 milliGRAM(s) IV Push daily  polyethylene glycol 3350 17 Gram(s) Oral every 12 hours  senna 2 Tablet(s) Oral at bedtime  dextrose 50% Injectable 25 Gram(s) IV Push once  dextrose 50% Injectable 12.5 Gram(s) IV Push once  dextrose 50% Injectable 25 Gram(s) IV Push once  dextrose Oral Gel 15 Gram(s) Oral once PRN  glucagon  Injectable 1 milliGRAM(s) IntraMuscular once  insulin glargine Injectable (LANTUS) 8 Unit(s) SubCutaneous every morning  insulin lispro (ADMELOG) corrective regimen sliding scale   SubCutaneous every 6 hours  dextrose 5%. 1000 milliLiter(s) IV Continuous <Continuous>  dextrose 5%. 1000 milliLiter(s) IV Continuous <Continuous>  sodium chloride 0.9% lock flush 10 milliLiter(s) IV Push every 1 hour PRN  sodium chloride 0.9%. 1000 milliLiter(s) IV Continuous <Continuous>  chlorhexidine 2% Cloths 1 Application(s) Topical daily  povidone iodine 10% Solution 1 Application(s) Topical daily  sodium zirconium cyclosilicate 10 Gram(s) Oral three times a day    Mode: AC/ CMV (Assist Control/ Continuous Mandatory Ventilation)  RR (machine): 18  TV (machine): 350  FiO2: 80  PEEP: 8  ITime: 1  MAP: 20  PIP: 51    ICU Vital Signs Last 24 Hrs  T(C): 37.2 (28 Nov 2022 20:06), Max: 38 (28 Nov 2022 12:51)  T(F): 99 (28 Nov 2022 20:06), Max: 100.4 (28 Nov 2022 12:51)  HR: 76 (28 Nov 2022 19:00) (59 - 83)  BP: 113/57 (28 Nov 2022 19:00) (91/54 - 130/53)  BP(mean): 75 (28 Nov 2022 19:00) (62 - 81)  ABP: --  ABP(mean): --  RR: 28 (28 Nov 2022 19:00) (15 - 36)  SpO2: 97% (28 Nov 2022 19:00) (95% - 100%)  O2 Parameters below as of 28 Nov 2022 19:00  Patient On (Oxygen Delivery Method): ventilator  O2 Concentration (%): 80    I&O's Detail  27 Nov 2022 07:01  -  28 Nov 2022 07:00  --------------------------------------------------------  IN:    Enteral Tube Flush: 525 mL    Free Water: 120 mL    Glucerna 1.5: 1050 mL    IV PiggyBack: 100 mL    IV PiggyBack: 200 mL    Norepinephrine: 8.5 mL    PRBCs (Packed Red Blood Cells): 300 mL  Total IN: 2303.5 mL  OUT:    Dexmedetomidine: 0 mL    Incontinent per Collection Bag (mL): 1050 mL    sodium chloride 0.9%: 0 mL  Total OUT: 1050 mL  Total NET: 1253.5 mL    28 Nov 2022 07:01  -  28 Nov 2022 20:51  --------------------------------------------------------  IN:    Enteral Tube Flush: 275 mL    Glucerna 1.5: 600 mL    IV PiggyBack: 200 mL  Total IN: 1075 mL  OUT:  Total OUT: 0 mL  Total NET: 1075 mL    LABS:                      8.7    11.08 )-----------( 167      ( 28 Nov 2022 07:08 )             27.3     11-28  137  |  102  |  29<H>  ----------------------------<  153<H>  5.3   |  30  |  1.20  Ca    8.0<L>      28 Nov 2022 07:08  Phos  3.3     11-27  Mg     2.4     11-27  TPro  6.8  /  Alb  1.7<L>  /  TBili  1.0  /  DBili  x   /  AST  24  /  ALT  15  /  AlkPhos  119  11-28    CAPILLARY BLOOD GLUCOSE  POCT Blood Glucose.: 186 mg/dL (28 Nov 2022 17:35)    CULTURES:      Physical Examination:  General: Cachetic ill-appearing adult female.   HEENT: PERRL.  NECK: +Trach.   PULM: Course BS at bases b/l.   CVS: s1/s2.  ABD: Soft, nondistended, nontender, normoactive bowel sounds.  EXT: No edema, nontender. Contracted.   SKIN: Warm.    RADIOLOGY:   < from: US Duplex Venous Lower Ext Complete, Bilateral (11.26.22 @ 23:01) >  ACC: [id number] EXAM:  US DPLX LWR EXT VEINS COMPL BI                        PROCEDURE DATE:  11/26/2022    IMPRESSION:  No evidence of deep venous thrombosis in either lower extremity.    < from: Xray Chest 1 View- PORTABLE-Routine (Xray Chest 1 View- PORTABLE-Routine in AM.) (11.26.22 @ 07:19) >  ACC: [id number] EXAM:  XR CHEST PORTABLE ROUTINE 1V                        PROCEDURE DATE:  11/26/2022   Impression:  Limited examination dueto patient's hand position.  Pulmonary venous congestion and/or pneumonia with bilateral effusions.      78 y/o F pmhx of Dementia, COPD, chronic respiratory failure requiring trach and peg, now vent dependent, cardiac arrest, quadriplegia, CVA, and CKD presented to  ED w/ fevers and abnormal labs. Found to have a elevated WBC w/ L shift, febrile and CXR w/ worsening R infiltrates. Admitted for septic shock in setting of suspected VAP. Sputum cultures positive for Acinetobacter baumannii.  Assessment:  1. Septic Shock (resolved)   2. Acute on Chronic Hypoxic Respiratory Failure  3. VAP    Plan:  NEURO:  -Ativan q4h w/ PRN Ativan for agitation. Will use Precedex infusion PRN for vent dyssynchrony / agitation.     CV:  -Weaned off vasopressors, MAP >65. Low threshold ot reinstitute vasopressors to maintain goal MAP >65. Midodrine 15mg q8h.   -Keep K~4 and Mg>2 for optimal arrhythmia suppression.  -Card following.    RESP:  -Patient currently on Full vent support, remains w/ high ventilatory requirements -  +8/80% --> actively wean to 70% FiO2.   -titrate settings to maintain SaO2 >90%, or pH >7.25  -consider low tidal volume ventilation strategy w/ goal Tv 4-6 cc/kg of ideal body weight  -plateu pressure goal <30  -Peridex oral care  -aggressive pulmonary toilet  -Oxygenation remains very tenuous, desaturates very quickly w/ little movement, TLC. Actively wean FiO2 as able.     RENAL:  -Renal function currently WNL.  -trend lytes/Scr daily with BMP  -I's and O's, goal UOP 0.5 cc/kg/hr  -renal dose meds and avoid nephrotoxins   -Nephrology following. Lokelma TID.     GI:  -TF.  -Protonix QD.     ENDO:  -Aggressive glycemic control to limit FS glucose to <180mg/dl. ISS, Lantus.    ID:  -Afebrile. SputumCx w/ Acinetobacter baumannii. ID following, on Unasyn course.     HEME:  -DVT ppx w/ SQH.     DISPO: Full Code.     CRITICAL CARE TIME SPENT:  38 minutes of critical care time spent providing medical care for patient's acute illness/conditions that impairs at least one vital organ system and/or poses a high risk of imminent or life threatening deterioration in the patient's condition. It includes time spent evaluating and treating the patient's acute illness as well as time spent reviewing labs, radiology, discussing goals of care with patient and/or patient's family, and discussing the case with a multidisciplinary team, including the eICU, in an effort to prevent further life threatening deterioration or end organ damage. This time is independent of any procedures performed.

## 2022-11-28 NOTE — PROGRESS NOTE ADULT - SUBJECTIVE AND OBJECTIVE BOX
[facility], Division of Infectious Diseases  MOIZ Lora Y. Patel, S. Shah, G[facility]  [phone]    Name: BREA BECKHAM  Age: 77y  Gender: Female  MRN: [id number]    Interval History:  Patient seen and examined at bedside  No acute overnight events.  Febrile to 100.4F  Notes reviewed    Antibiotics:  ampicillin/sulbactam  IVPB 3 Gram(s) IV Intermittent every 6 hours      Medications:  albuterol    90 MICROgram(s) HFA Inhaler 2 Puff(s) Inhalation two times a day  ampicillin/sulbactam  IVPB 3 Gram(s) IV Intermittent every 6 hours  chlorhexidine 2% Cloths 1 Application(s) Topical daily  dexMEDEtomidine Infusion 0.5 MICROgram(s)/kG/Hr IV Continuous <Continuous>  dextrose 5%. 1000 milliLiter(s) IV Continuous <Continuous>  dextrose 5%. 1000 milliLiter(s) IV Continuous <Continuous>  dextrose 50% Injectable 25 Gram(s) IV Push once  dextrose 50% Injectable 12.5 Gram(s) IV Push once  dextrose 50% Injectable 25 Gram(s) IV Push once  dextrose Oral Gel 15 Gram(s) Oral once PRN  glucagon  Injectable 1 milliGRAM(s) IntraMuscular once  heparin   Injectable 5000 Unit(s) SubCutaneous every 12 hours  insulin glargine Injectable (LANTUS) 8 Unit(s) SubCutaneous every morning  insulin lispro (ADMELOG) corrective regimen sliding scale   SubCutaneous every 6 hours  LORazepam     Tablet 1 milliGRAM(s) Oral every 4 hours  LORazepam   Injectable 2 milliGRAM(s) IV Push every 6 hours PRN  midodrine 15 milliGRAM(s) Oral every 8 hours  norepinephrine Infusion 0.05 MICROgram(s)/kG/Min IV Continuous <Continuous>  pantoprazole  Injectable 40 milliGRAM(s) IV Push daily  polyethylene glycol 3350 17 Gram(s) Oral every 12 hours  povidone iodine 10% Solution 1 Application(s) Topical daily  senna 2 Tablet(s) Oral at bedtime  sodium chloride 0.9% lock flush 10 milliLiter(s) IV Push every 1 hour PRN  sodium chloride 0.9%. 1000 milliLiter(s) IV Continuous <Continuous>  sodium zirconium cyclosilicate 10 Gram(s) Oral three times a day      Review of Systems:  unable to obtain    Allergies: codeine (Hives)    For details regarding the patient's past medical history, social history, family history, and other miscellaneous elements, please refer the initial infectious diseases consultation and/or the admitting history and physical examination for this admission.    Objective:  Vitals:   T(C): 38 (11-28-22 @ 12:51), Max: 38 (11-28-22 @ 12:51)  HR: 68 (11-28-22 @ 12:03) (54 - 107)  BP: 110/48 (11-28-22 @ 12:00) (83/45 - 197/74)  RR: 27 (11-28-22 @ 12:00) (15 - 42)  SpO2: 98% (11-28-22 @ 12:03) (95% - 100%)    Physical Examination:  General: no acute distress  HEENT: NC/AT, EOMI  Neck: trach  Cardio: S1, S2 heard, RRR, no murmurs  Resp: MV breath sounds  Abd: distended  Ext: no edema or cyanosis  Skin: warm, dry, no visible rash      Laboratory Studies:  CBC:                       8.7    11.08 )-----------( 167      ( 28 Nov 2022 07:08 )             27.3     CMP: 11-28    137  |  102  |  29<H>  ----------------------------<  153<H>  5.3   |  30  |  1.20    Ca    8.0<L>      28 Nov 2022 07:08  Phos  3.3     11-27  Mg     2.4     11-27    TPro  6.8  /  Alb  1.7<L>  /  TBili  1.0  /  DBili  x   /  AST  24  /  ALT  15  /  AlkPhos  119  11-28    LIVER FUNCTIONS - ( 28 Nov 2022 07:08 )  Alb: 1.7 g/dL / Pro: 6.8 g/dL / ALK PHOS: 119 U/L / ALT: 15 U/L DA / AST: 24 U/L / GGT: x               Microbiology: reviewed    Culture - Sputum (collected 11-21-22 @ 20:14)  Source: Trach Asp Tracheal Aspirate  Gram Stain (11-22-22 @ 08:43):    Moderate polymorphonuclear leukocytes per low power field    No Squamous epithelial cells per low power field    No organisms seen  Final Report (11-24-22 @ 17:55):    Moderate Acinetobacter baumannii/nosocom group (Carbapenem Resistant)    Normal Respiratory Elvira present  Organism: Acinetobacter baumannii/nosocom group (Carbapenem Resistant) (11-24-22 @ 17:55)  Organism: Acinetobacter baumannii/nosocom group (Carbapenem Resistant) (11-24-22 @ 17:55)      -  Polymyxin B: I 0.25      Method Type: ETEST  Organism: Acinetobacter baumannii/nosocom group (Carbapenem Resistant) (11-24-22 @ 17:55)      -  Minocycline: R      -  Piperacillin/Tazobactam: R      Method Type: KB  Organism: Acinetobacter baumannii/nosocom group (Carbapenem Resistant) (11-24-22 @ 17:55)      -  Amikacin: S <=16      -  Ampicillin/Sulbactam: S 8/4      -  Cefepime: R >16      -  Ceftazidime: R >16      -  Ciprofloxacin: R >2      -  Gentamicin: I 8      -  Imipenem: R 8      -  Levofloxacin: R >4      -  Meropenem: R >8      -  Tobramycin: R >8      -  Trimethoprim/Sulfamethoxazole: R >2/38      Method Type: PRATIK    Culture - Urine (collected 11-21-22 @ 04:25)  Source: Clean Catch Clean Catch (Midstream)  Final Report (11-22-22 @ 09:39):    <10,000 CFU/mL Normal Urogenital Elvira    Culture - Blood (collected 11-21-22 @ 03:56)  Source: .Blood Blood-Peripheral  Final Report (11-26-22 @ 14:01):    No Growth Final    Culture - Blood (collected 11-21-22 @ 03:56)  Source: .Blood Blood-Peripheral  Final Report (11-26-22 @ 14:00):    No Growth Final          Radiology: reviewed

## 2022-11-28 NOTE — PROGRESS NOTE ADULT - SUBJECTIVE AND OBJECTIVE BOX
Patient is a 77y old  Female who presents with a chief complaint of diaphoresis and fever (28 Nov 2022 13:35)        HPI:  77F with dementia, COPD with chronic respiratory failure s/p trach, cardiac arrest, CHH, quadriplegia, CVA, CKD, anemia, PEG tube, and multiple hospital admissions for respiratory distress presents to the ED BIBEMS from [facility] for diaphoresis and fever.   Patient unable to provide any history.  Patient was just discharged here from 11/12-11/16 for low grade fever, midline malfunction, leukocytosis, fever, concerning for sepsis possibly 2/2 unresolved VAP.  Was treated with meropenem.  Per notes from [facility], around 3am, patient was does "not look good" and was noted to be diaphoretic, tachypneic, and febrile to 100.9F.  Was sent here for further evaluation.  In the ED, patient was febrile to 101.5F and tachycardic to 112.  Labs showed leukocytosis of 16 (up from 6.5), hyperkalemia 5.4, ABG 7.59/36/273.  Patient is being readmitted for sepsis 2/2 presumably from VAP.   (21 Nov 2022 04:55)      SUBJECTIVE & OBJECTIVE: Pt seen and examined at bedside. nad    PHYSICAL EXAM:  T(C): 38 (11-28-22 @ 12:51), Max: 38 (11-28-22 @ 12:51)  HR: 68 (11-28-22 @ 12:03) (54 - 107)  BP: 110/48 (11-28-22 @ 12:00) (83/45 - 197/74)  RR: 27 (11-28-22 @ 12:00) (15 - 42)  SpO2: 98% (11-28-22 @ 12:03) (95% - 100%)  Wt(kg): --   GENERAL: NAD, well-groomed, well-developed  NECK: Supple, No JVD  NERVOUS SYSTEM:  Alert & Oriented X3,   CHEST/LUNG: Clear to auscultation bilaterally; No rales, rhonchi, wheezing, or rubs  HEART: Regular rate and rhythm; No murmurs, rubs, or gallops  ABDOMEN: Soft, Nontender, Nondistended; Bowel sounds present  EXTREMITIES:  2+ Peripheral Pulses, No clubbing, cyanosis, or edema        MEDICATIONS  (STANDING):  albuterol    90 MICROgram(s) HFA Inhaler 2 Puff(s) Inhalation two times a day  ampicillin/sulbactam  IVPB 3 Gram(s) IV Intermittent every 6 hours  chlorhexidine 2% Cloths 1 Application(s) Topical daily  dexMEDEtomidine Infusion 0.5 MICROgram(s)/kG/Hr (7.14 mL/Hr) IV Continuous <Continuous>  dextrose 5%. 1000 milliLiter(s) (50 mL/Hr) IV Continuous <Continuous>  dextrose 5%. 1000 milliLiter(s) (100 mL/Hr) IV Continuous <Continuous>  dextrose 50% Injectable 25 Gram(s) IV Push once  dextrose 50% Injectable 12.5 Gram(s) IV Push once  dextrose 50% Injectable 25 Gram(s) IV Push once  glucagon  Injectable 1 milliGRAM(s) IntraMuscular once  heparin   Injectable 5000 Unit(s) SubCutaneous every 12 hours  insulin glargine Injectable (LANTUS) 8 Unit(s) SubCutaneous every morning  insulin lispro (ADMELOG) corrective regimen sliding scale   SubCutaneous every 6 hours  LORazepam     Tablet 1 milliGRAM(s) Oral every 4 hours  midodrine 15 milliGRAM(s) Oral every 8 hours  norepinephrine Infusion 0.05 MICROgram(s)/kG/Min (5.35 mL/Hr) IV Continuous <Continuous>  pantoprazole  Injectable 40 milliGRAM(s) IV Push daily  polyethylene glycol 3350 17 Gram(s) Oral every 12 hours  povidone iodine 10% Solution 1 Application(s) Topical daily  senna 2 Tablet(s) Oral at bedtime  sodium chloride 0.9%. 1000 milliLiter(s) (100 mL/Hr) IV Continuous <Continuous>  sodium zirconium cyclosilicate 10 Gram(s) Oral three times a day    MEDICATIONS  (PRN):  dextrose Oral Gel 15 Gram(s) Oral once PRN Blood Glucose LESS THAN 70 milliGRAM(s)/deciliter  LORazepam   Injectable 2 milliGRAM(s) IV Push every 6 hours PRN Agitation  sodium chloride 0.9% lock flush 10 milliLiter(s) IV Push every 1 hour PRN Pre/post blood products, medications, blood draw, and to maintain line patency      LABS:                        8.7    11.08 )-----------( 167      ( 28 Nov 2022 07:08 )             27.3     11-28    137  |  102  |  29<H>  ----------------------------<  153<H>  5.3   |  30  |  1.20    Ca    8.0<L>      28 Nov 2022 07:08  Phos  3.3     11-27  Mg     2.4     11-27    TPro  6.8  /  Alb  1.7<L>  /  TBili  1.0  /  DBili  x   /  AST  24  /  ALT  15  /  AlkPhos  119  11-28        Magnesium, Serum: 2.4 mg/dL (11-27 @ 20:10)    CAPILLARY BLOOD GLUCOSE      POCT Blood Glucose.: 170 mg/dL (28 Nov 2022 11:27)  POCT Blood Glucose.: 163 mg/dL (28 Nov 2022 07:21)  POCT Blood Glucose.: 96 mg/dL (28 Nov 2022 00:16)  POCT Blood Glucose.: 104 mg/dL (27 Nov 2022 22:33)  POCT Blood Glucose.: 124 mg/dL (27 Nov 2022 17:56)      CAPILLARY BLOOD GLUCOSE      POCT Blood Glucose.: 170 mg/dL (28 Nov 2022 11:27)  POCT Blood Glucose.: 163 mg/dL (28 Nov 2022 07:21)  POCT Blood Glucose.: 96 mg/dL (28 Nov 2022 00:16)  POCT Blood Glucose.: 104 mg/dL (27 Nov 2022 22:33)  POCT Blood Glucose.: 124 mg/dL (27 Nov 2022 17:56)    CAPILLARY BLOOD GLUCOSE      POCT Blood Glucose.: 170 mg/dL (28 Nov 2022 11:27)            RECENT CULTURES:      RADIOLOGY & ADDITIONAL TESTS:                        DVT/GI ppx  Discussed with pt @ bedside

## 2022-11-28 NOTE — PROGRESS NOTE ADULT - ASSESSMENT
77F with dementia, COPD with chronic respiratory failure s/p trach, cardiac arrest, CHH, quadriplegia, CVA, CKD, anemia, PEG tube, and multiple hospital admissions for respiratory distress presents to the ED BIBEMS from [facility] for diaphoresis and fever.         ANemia  multifacotrial/ AICD   s/p 1 unit prnc   HH improved from 6.3 to 8.7  continue supportive care    Sepsis 2/2 VAP - meets sepsis based on fever + tachycardia + source of infection.  Lactate 1.9  peristent hypotension  on pressors   - on Bactrim per ID until 11/27  - cont with vent settings to maintain SaO2 >92%  - cont with midodrine 15mg TID and levo for pressure support   - on ativan for agitation  - MRSA neg, trach culture +acinetobacter   - palliative care consult  wean as tolerating   Cont Unasyn x 7 days  Trend temps and cbc    Hyperkalemia  resolved  s/p lokelma       Hyponatremia  - Na of 130 today, likely hypovolemic  - urine lytes ordered  - continue to trend    Leukocytosis  - WBC WNL  - no fevers  - was on bactrim, changed to unasyn  - continue to trend     DM2 on insulin  - cont with lantus and insulin coverage scale with FS q6h while on TF    COPD   - cont with neb treatments    Anemia of chronic disease   - GI eval - conservative management   - cont with ferrous sulfate  - trend CBC    Prognosis grave, may need ethics eval

## 2022-11-28 NOTE — PROGRESS NOTE ADULT - SUBJECTIVE AND OBJECTIVE BOX
BREA BECKHAM     [facility] 03    Allergies    codeine (Hives)    Intolerances        PAST MEDICAL & SURGICAL HISTORY:  Dementia of frontal lobe type      Aphasic stroke      Diabetes mellitus      Respiratory failure      Hypertension      GERD (gastroesophageal reflux disease)      Constipation      Respiratory failure      CVA (cerebral vascular accident)      HTN (hypertension)      DM (diabetes mellitus)      Advanced dementia      COVID-19 virus detected      Quadriplegia      Pneumonia      Type II diabetes mellitus      Hx of appendectomy      Gastrostomy in place      Tracheostomy in place      Tracheostomy tube present      Feeding by G-tube          FAMILY HISTORY:  No pertinent family history in first degree relatives        Home Medications:  Admelog 100 units/mL injectable solution: injectable every 6 hours SS (21 Nov 2022 05:08)  albuterol 90 mcg/inh inhalation aerosol with adapter: 2  inhaled every 6 hours (21 Nov 2022 04:24)  Bacid (LAC) oral tablet: 2 tab(s) by gastrostomy tube once a day (21 Nov 2022 04:24)  bacitracin 500 units/g topical ointment: Apply topically to affected area once a day to knees (21 Nov 2022 04:24)  chlorhexidine 0.12% mucous membrane liquid: 15 milliliter(s) mucous membrane 2 times a day (21 Nov 2022 04:24)  Dakins Full Strength 0.5% topical solution: Apply topically to affected area 2 times a day then apply santyl and calcium alginate (21 Nov 2022 04:24)  Eucerin topical cream: Apply topically to affected area once a day bilateral feet (21 Nov 2022 04:24)  ferrous sulfate 325 mg (65 mg elemental iron) oral tablet: 1 tab(s) by gastrostomy tube once a day (21 Nov 2022 04:24)  insulin glargine 100 units/mL subcutaneous solution: 8 unit(s) subcutaneous once a day (in the morning) (21 Nov 2022 04:24)  ipratropium-albuterol 0.5 mg-2.5 mg/3 mL inhalation solution: 3 milliliter(s) inhaled every 6 hours (21 Nov 2022 04:24)  LORazepam 1 mg oral tablet: 1 tab(s) by gastrostomy tube every 4 hours (21 Nov 2022 04:24)  midodrine 10 mg oral tablet: 1 tab(s) by gastrostomy tube every 8 hours (21 Nov 2022 04:24)  MiraLax oral powder for reconstitution: orally once a day (at bedtime) (21 Nov 2022 05:08)  mulivitamin: by gastrostomy tube once a day (21 Nov 2022 04:24)  nystatin 100,000 units/g topical powder: 1 application topically 3 times a day (21 Nov 2022 04:24)  omeprazole 40 mg oral delayed release capsule: 1 cap(s) by gastrostomy tube 2 times a day (21 Nov 2022 04:24)  polyethylene glycol 3350 oral powder for reconstitution: 17 gram(s) by gastrostomy tube every 12 hours (21 Nov 2022 04:24)  senna 8.6 mg oral tablet: 3 tab(s) by gastrostomy tube once a day (at bedtime) (21 Nov 2022 04:24)  simethicone 80 mg oral tablet, chewable: 1 tab(s) by gastrostomy tube every 6 hours (21 Nov 2022 04:24)  sucralfate 1 g/10 mL oral suspension: 10 milliliter(s) g-tube 4 times a day (before meals and at bedtime) (21 Nov 2022 04:24)  Tylenol 325 mg oral tablet: 2 tab(s) orally every 6 hours, As Needed prior to dressing change (21 Nov 2022 04:24)      MEDICATIONS  (STANDING):  albuterol    90 MICROgram(s) HFA Inhaler 2 Puff(s) Inhalation two times a day  ampicillin/sulbactam  IVPB 3 Gram(s) IV Intermittent every 6 hours  chlorhexidine 2% Cloths 1 Application(s) Topical daily  dexMEDEtomidine Infusion 0.5 MICROgram(s)/kG/Hr (7.14 mL/Hr) IV Continuous <Continuous>  dextrose 5%. 1000 milliLiter(s) (50 mL/Hr) IV Continuous <Continuous>  dextrose 5%. 1000 milliLiter(s) (100 mL/Hr) IV Continuous <Continuous>  dextrose 50% Injectable 25 Gram(s) IV Push once  dextrose 50% Injectable 12.5 Gram(s) IV Push once  dextrose 50% Injectable 25 Gram(s) IV Push once  glucagon  Injectable 1 milliGRAM(s) IntraMuscular once  heparin   Injectable 5000 Unit(s) SubCutaneous every 12 hours  insulin glargine Injectable (LANTUS) 8 Unit(s) SubCutaneous every morning  insulin lispro (ADMELOG) corrective regimen sliding scale   SubCutaneous every 6 hours  LORazepam     Tablet 1 milliGRAM(s) Oral every 4 hours  midodrine 15 milliGRAM(s) Oral every 8 hours  norepinephrine Infusion 0.05 MICROgram(s)/kG/Min (5.35 mL/Hr) IV Continuous <Continuous>  pantoprazole  Injectable 40 milliGRAM(s) IV Push daily  polyethylene glycol 3350 17 Gram(s) Oral every 12 hours  povidone iodine 10% Solution 1 Application(s) Topical daily  senna 2 Tablet(s) Oral at bedtime  sodium chloride 0.9%. 1000 milliLiter(s) (100 mL/Hr) IV Continuous <Continuous>  sodium zirconium cyclosilicate 10 Gram(s) Oral three times a day    MEDICATIONS  (PRN):  dextrose Oral Gel 15 Gram(s) Oral once PRN Blood Glucose LESS THAN 70 milliGRAM(s)/deciliter  LORazepam   Injectable 2 milliGRAM(s) IV Push every 6 hours PRN Agitation  sodium chloride 0.9% lock flush 10 milliLiter(s) IV Push every 1 hour PRN Pre/post blood products, medications, blood draw, and to maintain line patency      Diet, NPO with Tube Feed:   Tube Feeding Modality: Gastrostomy  Glucerna 1.5 Horacio  Total Volume for 24 Hours (mL): 1000  Continuous  Starting Tube Feed Rate mL per Hour: 10  Increase Tube Feed Rate by (mL): 10     Every 10 hours  Until Goal Tube Feed Rate (mL per Hour): 50  Tube Feed Duration (in Hours): 20  Tube Feed Start Time: 17:00  Free Water Flush  Pump   Rate (mL per Hour): 25   Frequency: Every Hour    Duration (Hours): 24 (11-21-22 @ 05:10) [Active]          Vital Signs Last 24 Hrs  T(C): 37.3 (28 Nov 2022 04:03), Max: 37.3 (28 Nov 2022 00:03)  T(F): 99.1 (28 Nov 2022 04:03), Max: 99.2 (28 Nov 2022 00:03)  HR: 77 (28 Nov 2022 06:49) (54 - 114)  BP: 126/62 (28 Nov 2022 04:00) (83/45 - 197/74)  BP(mean): 81 (28 Nov 2022 04:00) (57 - 118)  RR: 36 (28 Nov 2022 04:00) (15 - 43)  SpO2: 95% (28 Nov 2022 06:49) (87% - 100%)    Parameters below as of 28 Nov 2022 06:49  Patient On (Oxygen Delivery Method): ventilator,.80          11-27-22 @ 07:01  -  11-28-22 @ 07:00  --------------------------------------------------------  IN: 2053.5 mL / OUT: 1050 mL / NET: 1003.5 mL        Mode: AC/ CMV (Assist Control/ Continuous Mandatory Ventilation), RR (machine): 18, TV (machine): 350, FiO2: 80, PEEP: 8, ITime: 1, MAP: 18, PIP: 42      LABS:                        6.7    9.55  )-----------( 160      ( 27 Nov 2022 20:10 )             22.3     11-27    137  |  102  |  28<H>  ----------------------------<  87  5.9<H>   |  31  |  1.20    Ca    8.1<L>      27 Nov 2022 20:10  Phos  3.3     11-27  Mg     2.4     11-27    TPro  6.4  /  Alb  1.7<L>  /  TBili  0.3  /  DBili  x   /  AST  23  /  ALT  15  /  AlkPhos  138<H>  11-27              WBC:  WBC Count: 9.55 K/uL (11-27 @ 20:10)  WBC Count: 9.13 K/uL (11-27 @ 06:35)  WBC Count: 9.41 K/uL (11-26 @ 06:09)  WBC Count: 9.01 K/uL (11-25 @ 05:54)      MICROBIOLOGY:  RECENT CULTURES:  11-21 Trach Asp Tracheal Aspirate Acinetobacter baumannii/nosocom group (Carbapenem Resistant)   Moderate polymorphonuclear leukocytes per low power field  No Squamous epithelial cells per low power field  No organisms seen   Moderate Acinetobacter baumannii/nosocom group (Carbapenem Resistant)  Normal Respiratory Elvira present                    Sodium:  Sodium, Serum: 137 mmol/L (11-27 @ 20:10)  Sodium, Serum: 137 mmol/L (11-27 @ 06:35)  Sodium, Serum: 135 mmol/L (11-26 @ 06:09)  Sodium, Serum: 131 mmol/L (11-25 @ 05:54)      1.20 mg/dL 11-27 @ 20:10  1.17 mg/dL 11-27 @ 06:35  1.26 mg/dL 11-26 @ 06:09  1.19 mg/dL 11-25 @ 05:54      Hemoglobin:  Hemoglobin: 6.7 g/dL (11-27 @ 20:10)  Hemoglobin: 7.2 g/dL (11-27 @ 06:35)  Hemoglobin: 7.5 g/dL (11-26 @ 06:09)  Hemoglobin: 7.4 g/dL (11-25 @ 05:54)      Platelets: Platelet Count - Automated: 160 K/uL (11-27 @ 20:10)  Platelet Count - Automated: 176 K/uL (11-27 @ 06:35)  Platelet Count - Automated: 193 K/uL (11-26 @ 06:09)  Platelet Count - Automated: 197 K/uL (11-25 @ 05:54)      LIVER FUNCTIONS - ( 27 Nov 2022 06:35 )  Alb: 1.7 g/dL / Pro: 6.4 g/dL / ALK PHOS: 138 U/L / ALT: 15 U/L DA / AST: 23 U/L / GGT: x                 RADIOLOGY & ADDITIONAL STUDIES:      MICROBIOLOGY:  RECENT CULTURES:  11-21 Trach Asp Tracheal Aspirate Acinetobacter baumannii/nosocom group (Carbapenem Resistant)   Moderate polymorphonuclear leukocytes per low power field  No Squamous epithelial cells per low power field  No organisms seen   Moderate Acinetobacter baumannii/nosocom group (Carbapenem Resistant)  Normal Respiratory Elvira present

## 2022-11-28 NOTE — PROGRESS NOTE ADULT - ASSESSMENT
Anemia  peg leakage  dysphagia   resp failure    plan  no signs of GIB   monitor cbc  transfuse prn  will fu  peg dressing changes and peg feedings  gauze wiuth maalox at site    Advanced care planning was discussed with patient and family.  Advanced care planning forms were reviewed and discussed.  Risks, benefits and alternatives of gastroenterologic procedures were discussed in detail and all questions were answered.    30 minutes spent.

## 2022-11-28 NOTE — PROGRESS NOTE ADULT - ASSESSMENT
77F with dementia, COPD with chronic respiratory failure s/p trach, cardiac arrest, CHH, quadriplegia, CVA, CKD, anemia, PEG tube, and multiple hospital admissions for respiratory distress presents to the ED BIBEMS from [facility] for diaphoresis and fever.     Readmitted with sepsis with shock  2/2 recurent VAP.    Sputum cx 11/21 with Acinetobacter baumannii/nosocom group (Carbapenem Resistant) resistant to Bactrim  WBC better  Remains on pressors and on 100% fi02    Plan:  Cont Unasyn x 7 days until 12/1  Trend temps and cbc  Asp precautions  Aggressive pulm toileting  Isolation per infection control policy  Vent management per ICU  Wean off pressors as BP permits    Poor prognosis  Continued [facility]    Infectious Diseases will continue to follow. Please call with any questions.   Andressa Brantley M.D.  [facility] Division of Infectious Diseases [phone]

## 2022-11-28 NOTE — PROGRESS NOTE ADULT - ASSESSMENT
REVIEW OF SYMPTOMS      Able to give (reliable) ROS  NO     PHYSICAL EXAM    HEENT Unremarkable  atraumatic   RESP Fair air entry EXP prolonged    Harsh breath sound Resp distres mild   CARDIAC S1 S2 No S3     NO JVD    ABDOMEN SOFT BS PRESENT NOT DISTENDED No hepatosplenomegaly   PEDAL EDEMA present No calf tenderness  NO rash       GENERAL DATA .   GOC.  full code      ALLGY.     codeine                        WT. 11/21/2022 57  BMI.    11/21/2022 21                          ICU STAY.   admitted ICU 11/21/2022  COVID.  Scv2 11/21/2022 scv2 (-)    PROCEDURE.  11/21/2022 [facility] central line    BEST PRACTICE ISSUES.    HOB ELEVATN. Yes  DVT PPLX.  11/21/2022 hpsc    SQUIRES PPLX.    11/21/2022 protonix 40   INFN PPLX.    11/21/2022 chlorhexidine .12% bid (mrsa)   11/21/2022 chlorhexidine 2% (c li)   SP SW EM.         DIET.  11/21/2022 glucerna 1.5 1000 gt      VS/ PO/IO/ VENT/ DRIPS.  11/28/2022 99f 70 116/50   11/28/2022 ac 18/350/8/.8       CURRENT ADMISSION  Pt sent back from [facility] 11/21/2022 with fever abn labs Found yto be in shock Norepi started 11/21/2022   Pulm crit care consulted      RECENT ADMISSIONS.  11/12-11/16/2022 11/4-11/10/2022  11/5 stenotrophomonas  uc 11/4 vr enterococc 50-99 candida   11/4/2022 levaquin 750 dced 11/7 doxy  11/8 bactrim 250.3  10/18-11/2/2022 11/21 ADMISSION PROBLEMS.  Vent dependent   .. Trach poa 11/21/2022  .. ac 18/350/8/100   RO VTE   .. 11/26 v duplx (-)   RIJ 11/21/2022   INFECTN   .. Decub ulcers   .. VAP 11/21/2022  ...... trach 11/21 mod acinetobacter carbapenem resist   ...... Bactrim tid  11/21-11/25/2022  ...... 11/25/2022 Unasyn 3.4 x 7d Dr Chairez    COPD  Shock   .. Norepi 11/21/2022-> 11/21-11/27  .. midodrine 11/21/2022   peg poa 11/21/2022  ANEMIA   .. Hb 11/25/2022 Hb 7.5  Hyponatremia  .. Na 11/24-11/26/2022 Na 130 - 135  Hyperkalemia .  .. k 11/26/2022 k 5.7       ASSESSMENT/RECOMMENDATIONS .   RESP.  .. Gas exchange.  11/21/2022 4a On 100% vent 759/36/273   Monitor & target po 90-95%  .. VENT MANAGEMENT.   HOB elevation  Target Pplat 30 (-)  Target PO 90-95%  Target pH 730 (+)  Daily spontaneous breathing trials   Daily sedation vacation   .. Pleural effsn .   CXR 11/21/2022 r gr than l effsn  Effusion has been tapped during prev admissions and is lp exudate If fever or worsening sepsis will plan thorac  RO VTE.  11/26 v duplx (-)   11/26/2022 check cta ch   .. Bronchospasm.  11/21/2022 albut hfa 2p bid   INFECTION.  .. SCV2 status.  Scv2 11/21/2022 scv2 (-)  .. VAP   w 11/21-11/23-11/24-11/25-11/26-11/27-11/28/2022 w 16 - 8.8 - 12- 9 - 9.4- 9.1 - 11    Pr 11/21-11/22/2022 pr 1.8 - 1.1  ua 11/21/2022 w 3-5   cxr 11/21 mod to large r effsn somewhat incr from 11/12 central infiltrates possibly congestive rij   rvp 11/21/2022 (-)   rsv 11/21/2022 (-)   uc 11/21 (-)   legn 11/21 (-)   trach 11/21 mod acinetobacter carbapenem resist   mrsa 11/21 (-)   bc 11/21 (-)   11/21/2022 bactrim 285 mg trimeth q 8 h Dr BRITTANY Brantley DCED 11/25/2022 11/25/2022 Unasyn 3.4 x 7d Dr Chairez    CARDIAC.  .. Shock.    11/21/2022 midodrine 10.3 -> 11/22 midodr 15.3   11/21/2022 5a norepi 8 in 250   target map 65 (+)   .. ekg changes.  ekg 11/21/2022 s tach shoirt pr qtc 470 possible rvh   tr 11/22/2022 tr 28   GI.  .. Nutrition.   11/21/2022 glucerna 1.5 1000 gt    HEMAT.  .. DVT pplx.   11/21/2022 hpsc    .. anemia.  Hb 11/21-11/22-11/23-11/24-11/25-11/26-11/28/2022   Hb 9.6- 7.8 - 7.7 - 7.7- 7.5 - 7.5  - 8.7   mcv 11/21/2022 mcv 90   inr 11/21/2022 inr 122   monitor target Hb 7 (+)   RENAL.  .. Hyperkalemia .  k 11/26-11/27/2022 k 5.7 - 6   11/26/2022 lokelma dose   11/27/2022 lokelma 10.3x2d   ENDOCRINE.   .. DM   11/21/2022 glarg 8 q am   11/21/2022 riss   NEURO.  .. seizures.  11/21/2022 lorazepam 1.6     TIME SPENT   Over 39 minutes aggregate critical care time spent on encounter; activities included   direct patient care, counseling and/or coordinating care reviewing notes, lab data/ imaging , discussion with multidisciplinary team/ patient  /family and explaining in detail risks, benefits, alternatives  of the recommendations     CHAPINCITO GUIDRY 76 f [facility] S 11/21/2022   DR JOSEPH DU

## 2022-11-29 LAB
ALBUMIN SERPL ELPH-MCNC: 1.5 G/DL — LOW (ref 3.3–5)
ALP SERPL-CCNC: 111 U/L — SIGNIFICANT CHANGE UP (ref 30–120)
ALT FLD-CCNC: 14 U/L DA — SIGNIFICANT CHANGE UP (ref 10–60)
ANION GAP SERPL CALC-SCNC: 5 MMOL/L — SIGNIFICANT CHANGE UP (ref 5–17)
AST SERPL-CCNC: 14 U/L — SIGNIFICANT CHANGE UP (ref 10–40)
BASE EXCESS BLDA CALC-SCNC: 9.1 MMOL/L — HIGH (ref -2–3)
BILIRUB SERPL-MCNC: 0.6 MG/DL — SIGNIFICANT CHANGE UP (ref 0.2–1.2)
BLOOD GAS COMMENTS ARTERIAL: SIGNIFICANT CHANGE UP
BUN SERPL-MCNC: 26 MG/DL — HIGH (ref 7–23)
CALCIUM SERPL-MCNC: 8 MG/DL — LOW (ref 8.4–10.5)
CHLORIDE SERPL-SCNC: 104 MMOL/L — SIGNIFICANT CHANGE UP (ref 96–108)
CO2 SERPL-SCNC: 30 MMOL/L — SIGNIFICANT CHANGE UP (ref 22–31)
CREAT SERPL-MCNC: 1.18 MG/DL — SIGNIFICANT CHANGE UP (ref 0.5–1.3)
EGFR: 47 ML/MIN/1.73M2 — LOW
GAS PNL BLDA: SIGNIFICANT CHANGE UP
GLUCOSE SERPL-MCNC: 118 MG/DL — HIGH (ref 70–99)
HCO3 BLDA-SCNC: 33 MMOL/L — HIGH (ref 21–28)
HCT VFR BLD CALC: 26.1 % — LOW (ref 34.5–45)
HGB BLD-MCNC: 8.1 G/DL — LOW (ref 11.5–15.5)
HOROWITZ INDEX BLDA+IHG-RTO: 80 — SIGNIFICANT CHANGE UP
MAGNESIUM SERPL-MCNC: 2.4 MG/DL — SIGNIFICANT CHANGE UP (ref 1.6–2.6)
MCHC RBC-ENTMCNC: 27.8 PG — SIGNIFICANT CHANGE UP (ref 27–34)
MCHC RBC-ENTMCNC: 31 GM/DL — LOW (ref 32–36)
MCV RBC AUTO: 89.7 FL — SIGNIFICANT CHANGE UP (ref 80–100)
NRBC # BLD: 0 /100 WBCS — SIGNIFICANT CHANGE UP (ref 0–0)
PCO2 BLDA: 45 MMHG — HIGH (ref 32–35)
PH BLDA: 7.47 — HIGH (ref 7.35–7.45)
PHOSPHATE SERPL-MCNC: 3.3 MG/DL — SIGNIFICANT CHANGE UP (ref 2.5–4.5)
PLATELET # BLD AUTO: 152 K/UL — SIGNIFICANT CHANGE UP (ref 150–400)
PO2 BLDA: 64 MMHG — LOW (ref 83–108)
POTASSIUM SERPL-MCNC: 4.2 MMOL/L — SIGNIFICANT CHANGE UP (ref 3.5–5.3)
POTASSIUM SERPL-SCNC: 4.2 MMOL/L — SIGNIFICANT CHANGE UP (ref 3.5–5.3)
PROT SERPL-MCNC: 6.8 G/DL — SIGNIFICANT CHANGE UP (ref 6–8.3)
RBC # BLD: 2.91 M/UL — LOW (ref 3.8–5.2)
RBC # FLD: 16.8 % — HIGH (ref 10.3–14.5)
SAO2 % BLDA: 94.6 % — SIGNIFICANT CHANGE UP (ref 94–98)
SODIUM SERPL-SCNC: 139 MMOL/L — SIGNIFICANT CHANGE UP (ref 135–145)
WBC # BLD: 9.14 K/UL — SIGNIFICANT CHANGE UP (ref 3.8–10.5)
WBC # FLD AUTO: 9.14 K/UL — SIGNIFICANT CHANGE UP (ref 3.8–10.5)

## 2022-11-29 PROCEDURE — 99232 SBSQ HOSP IP/OBS MODERATE 35: CPT

## 2022-11-29 RX ADMIN — SODIUM ZIRCONIUM CYCLOSILICATE 10 GRAM(S): 10 POWDER, FOR SUSPENSION ORAL at 05:18

## 2022-11-29 RX ADMIN — Medication 1 MILLIGRAM(S): at 13:22

## 2022-11-29 RX ADMIN — MIDODRINE HYDROCHLORIDE 15 MILLIGRAM(S): 2.5 TABLET ORAL at 13:22

## 2022-11-29 RX ADMIN — Medication 1 MILLIGRAM(S): at 05:19

## 2022-11-29 RX ADMIN — CHLORHEXIDINE GLUCONATE 1 APPLICATION(S): 213 SOLUTION TOPICAL at 05:18

## 2022-11-29 RX ADMIN — Medication 1 MILLIGRAM(S): at 17:25

## 2022-11-29 RX ADMIN — ALBUTEROL 2 PUFF(S): 90 AEROSOL, METERED ORAL at 18:39

## 2022-11-29 RX ADMIN — PANTOPRAZOLE SODIUM 40 MILLIGRAM(S): 20 TABLET, DELAYED RELEASE ORAL at 12:08

## 2022-11-29 RX ADMIN — HEPARIN SODIUM 5000 UNIT(S): 5000 INJECTION INTRAVENOUS; SUBCUTANEOUS at 17:24

## 2022-11-29 RX ADMIN — Medication 2: at 17:32

## 2022-11-29 RX ADMIN — AMPICILLIN SODIUM AND SULBACTAM SODIUM 200 GRAM(S): 250; 125 INJECTION, POWDER, FOR SUSPENSION INTRAMUSCULAR; INTRAVENOUS at 01:02

## 2022-11-29 RX ADMIN — Medication 1 MILLIGRAM(S): at 21:52

## 2022-11-29 RX ADMIN — SENNA PLUS 2 TABLET(S): 8.6 TABLET ORAL at 21:52

## 2022-11-29 RX ADMIN — HEPARIN SODIUM 5000 UNIT(S): 5000 INJECTION INTRAVENOUS; SUBCUTANEOUS at 05:19

## 2022-11-29 RX ADMIN — Medication 2 MILLIGRAM(S): at 11:18

## 2022-11-29 RX ADMIN — POLYETHYLENE GLYCOL 3350 17 GRAM(S): 17 POWDER, FOR SOLUTION ORAL at 05:18

## 2022-11-29 RX ADMIN — ALBUTEROL 2 PUFF(S): 90 AEROSOL, METERED ORAL at 06:58

## 2022-11-29 RX ADMIN — Medication 1 MILLIGRAM(S): at 10:50

## 2022-11-29 RX ADMIN — Medication 2: at 12:12

## 2022-11-29 RX ADMIN — AMPICILLIN SODIUM AND SULBACTAM SODIUM 200 GRAM(S): 250; 125 INJECTION, POWDER, FOR SUSPENSION INTRAMUSCULAR; INTRAVENOUS at 12:08

## 2022-11-29 RX ADMIN — AMPICILLIN SODIUM AND SULBACTAM SODIUM 200 GRAM(S): 250; 125 INJECTION, POWDER, FOR SUSPENSION INTRAMUSCULAR; INTRAVENOUS at 17:25

## 2022-11-29 RX ADMIN — MIDODRINE HYDROCHLORIDE 15 MILLIGRAM(S): 2.5 TABLET ORAL at 21:52

## 2022-11-29 RX ADMIN — AMPICILLIN SODIUM AND SULBACTAM SODIUM 200 GRAM(S): 250; 125 INJECTION, POWDER, FOR SUSPENSION INTRAMUSCULAR; INTRAVENOUS at 05:18

## 2022-11-29 RX ADMIN — MIDODRINE HYDROCHLORIDE 15 MILLIGRAM(S): 2.5 TABLET ORAL at 05:19

## 2022-11-29 RX ADMIN — Medication 1 APPLICATION(S): at 13:12

## 2022-11-29 RX ADMIN — INSULIN GLARGINE 8 UNIT(S): 100 INJECTION, SOLUTION SUBCUTANEOUS at 08:09

## 2022-11-29 NOTE — PROGRESS NOTE ADULT - SUBJECTIVE AND OBJECTIVE BOX
Date/Time Patient Seen:  		  Referring MD:   Data Reviewed	       Patient is a 77y old  Female who presents with a chief complaint of diaphoresis and fever (29 Nov 2022 06:45)      Subjective/HPI     PAST MEDICAL & SURGICAL HISTORY:  Dementia of frontal lobe type    Aphasic stroke    Diabetes mellitus    Respiratory failure    Hypertension    GERD (gastroesophageal reflux disease)    Constipation    Respiratory failure    CVA (cerebral vascular accident)    HTN (hypertension)    DM (diabetes mellitus)    Advanced dementia    COVID-19 virus detected    Quadriplegia    Pneumonia    Type II diabetes mellitus    Hx of appendectomy    Gastrostomy in place    Tracheostomy in place    Tracheostomy tube present    Feeding by G-tube          Medication list         MEDICATIONS  (STANDING):  albuterol    90 MICROgram(s) HFA Inhaler 2 Puff(s) Inhalation two times a day  ampicillin/sulbactam  IVPB 3 Gram(s) IV Intermittent every 6 hours  chlorhexidine 2% Cloths 1 Application(s) Topical daily  dexMEDEtomidine Infusion 0.5 MICROgram(s)/kG/Hr (7.14 mL/Hr) IV Continuous <Continuous>  dextrose 5%. 1000 milliLiter(s) (100 mL/Hr) IV Continuous <Continuous>  dextrose 5%. 1000 milliLiter(s) (50 mL/Hr) IV Continuous <Continuous>  dextrose 50% Injectable 25 Gram(s) IV Push once  dextrose 50% Injectable 12.5 Gram(s) IV Push once  dextrose 50% Injectable 25 Gram(s) IV Push once  glucagon  Injectable 1 milliGRAM(s) IntraMuscular once  heparin   Injectable 5000 Unit(s) SubCutaneous every 12 hours  insulin glargine Injectable (LANTUS) 8 Unit(s) SubCutaneous every morning  insulin lispro (ADMELOG) corrective regimen sliding scale   SubCutaneous every 6 hours  LORazepam     Tablet 1 milliGRAM(s) Oral every 4 hours  midodrine 15 milliGRAM(s) Oral every 8 hours  norepinephrine Infusion 0.05 MICROgram(s)/kG/Min (5.35 mL/Hr) IV Continuous <Continuous>  pantoprazole  Injectable 40 milliGRAM(s) IV Push daily  polyethylene glycol 3350 17 Gram(s) Oral every 12 hours  povidone iodine 10% Solution 1 Application(s) Topical daily  senna 2 Tablet(s) Oral at bedtime  sodium chloride 0.9%. 1000 milliLiter(s) (100 mL/Hr) IV Continuous <Continuous>    MEDICATIONS  (PRN):  dextrose Oral Gel 15 Gram(s) Oral once PRN Blood Glucose LESS THAN 70 milliGRAM(s)/deciliter  LORazepam   Injectable 2 milliGRAM(s) IV Push every 6 hours PRN Agitation  sodium chloride 0.9% lock flush 10 milliLiter(s) IV Push every 1 hour PRN Pre/post blood products, medications, blood draw, and to maintain line patency         Vitals log        ICU Vital Signs Last 24 Hrs  T(C): 37.4 (29 Nov 2022 03:26), Max: 38 (28 Nov 2022 12:51)  T(F): 99.3 (29 Nov 2022 03:26), Max: 100.4 (28 Nov 2022 12:51)  HR: 64 (29 Nov 2022 06:00) (64 - 86)  BP: 113/51 (29 Nov 2022 06:00) (105/50 - 138/60)  BP(mean): 70 (29 Nov 2022 06:00) (67 - 83)  ABP: --  ABP(mean): --  RR: 21 (29 Nov 2022 06:00) (17 - 35)  SpO2: 97% (29 Nov 2022 06:00) (93% - 100%)    O2 Parameters below as of 29 Nov 2022 06:00  Patient On (Oxygen Delivery Method): ventilator             Mode: AC/ CMV (Assist Control/ Continuous Mandatory Ventilation)  RR (machine): 18  TV (machine): 350  FiO2: 100  PEEP: 8  ITime: 1  MAP: 18  PIP: 41      Input and Output:  I&O's Detail    27 Nov 2022 07:01  -  28 Nov 2022 07:00  --------------------------------------------------------  IN:    Enteral Tube Flush: 525 mL    Free Water: 120 mL    Glucerna 1.5: 1050 mL    IV PiggyBack: 100 mL    IV PiggyBack: 200 mL    Norepinephrine: 8.5 mL    PRBCs (Packed Red Blood Cells): 300 mL  Total IN: 2303.5 mL    OUT:    Dexmedetomidine: 0 mL    Incontinent per Collection Bag (mL): 1050 mL    sodium chloride 0.9%: 0 mL  Total OUT: 1050 mL    Total NET: 1253.5 mL      28 Nov 2022 07:01  -  29 Nov 2022 06:48  --------------------------------------------------------  IN:    Enteral Tube Flush: 575 mL    Glucerna 1.5: 1200 mL    IV PiggyBack: 400 mL  Total IN: 2175 mL    OUT:  Total OUT: 0 mL    Total NET: 2175 mL          Lab Data                        8.7    11.08 )-----------( 167      ( 28 Nov 2022 07:08 )             27.3     11-28    137  |  102  |  29<H>  ----------------------------<  153<H>  5.3   |  30  |  1.20    Ca    8.0<L>      28 Nov 2022 07:08  Phos  3.3     11-27  Mg     2.4     11-27    TPro  6.8  /  Alb  1.7<L>  /  TBili  1.0  /  DBili  x   /  AST  24  /  ALT  15  /  AlkPhos  119  11-28    ABG - ( 29 Nov 2022 06:36 )  pH, Arterial: 7.47  pH, Blood: x     /  pCO2: 45    /  pO2: 64    / HCO3: 33    / Base Excess: 9.1   /  SaO2: 94.6                    Review of Systems	      Objective     Physical Examination  heart s1s2  lung dec BS  head nc        Pertinent Lab findings & Imaging      Annalise:  NO   Adequate UO     I&O's Detail    27 Nov 2022 07:01  -  28 Nov 2022 07:00  --------------------------------------------------------  IN:    Enteral Tube Flush: 525 mL    Free Water: 120 mL    Glucerna 1.5: 1050 mL    IV PiggyBack: 100 mL    IV PiggyBack: 200 mL    Norepinephrine: 8.5 mL    PRBCs (Packed Red Blood Cells): 300 mL  Total IN: 2303.5 mL    OUT:    Dexmedetomidine: 0 mL    Incontinent per Collection Bag (mL): 1050 mL    sodium chloride 0.9%: 0 mL  Total OUT: 1050 mL    Total NET: 1253.5 mL      28 Nov 2022 07:01  -  29 Nov 2022 06:48  --------------------------------------------------------  IN:    Enteral Tube Flush: 575 mL    Glucerna 1.5: 1200 mL    IV PiggyBack: 400 mL  Total IN: 2175 mL    OUT:  Total OUT: 0 mL    Total NET: 2175 mL               Discussed with:     Cultures:	        Radiology

## 2022-11-29 NOTE — PROGRESS NOTE ADULT - ASSESSMENT
REVIEW OF SYMPTOMS      Able to give (reliable) ROS  NO     PHYSICAL EXAM    HEENT Unremarkable  atraumatic   RESP Fair air entry EXP prolonged    Harsh breath sound Resp distres mild   CARDIAC S1 S2 No S3     NO JVD    ABDOMEN SOFT BS PRESENT NOT DISTENDED No hepatosplenomegaly   PEDAL EDEMA present No calf tenderness  NO rash       GENERAL DATA .   GOC.  full code      ALLGY.     codeine                        WT. 11/21/2022 57  BMI.    11/21/2022 21                          ICU STAY.   admitted ICU 11/21/2022  COVID.  Scv2 11/21/2022 scv2 (-)    PROCEDURE.  11/21/2022 [facility] central line    BEST PRACTICE ISSUES.    HOB ELEVATN. Yes  DVT PPLX.  11/21/2022 hpsc    SQUIRES PPLX.    11/21/2022 protonix 40   INFN PPLX.    11/21/2022 chlorhexidine .12% bid (mrsa)   11/21/2022 chlorhexidine 2% (c li)   SP SW EM.         DIET.  11/21/2022 glucerna 1.5 1000 gt                 VS/ PO/IO/ VENT/ DRIPS.  11/29/2022 88 120/60   11/29/2022   ac 18/350/8/70     CURRENT ADMISSION  Pt sent back from [facility] 11/21/2022 with fever abn labs Found yto be in shock Norepi started 11/21/2022   Pulm crit care consulted      RECENT ADMISSIONS.  11/12-11/16/2022 11/4-11/10/2022  11/5 stenotrophomonas  uc 11/4 vr enterococc 50-99 candida   11/4/2022 levaquin 750 dced 11/7 doxy  11/8 bactrim 250.3  10/18-11/2/2022 11/21 ADMISSION PROBLEMS.  Vent dependent   .. Trach poa 11/21/2022  .. ac 18/350/8/100   RO VTE   .. 11/26 v duplx (-)   RIJ 11/21/2022   INFECTN   .. Decub ulcers   .. VAP 11/21/2022  ...... trach 11/21 mod acinetobacter carbapenem resist   ...... Bactrim tid  11/21-11/25/2022  ...... 11/25/2022 Unasyn 3.4 x 7d Dr Chairez    COPD  Shock   .. Norepi 11/21/2022-> 11/21-11/27  .. midodrine 11/21/2022   peg poa 11/21/2022  ANEMIA   .. Hb 11/25/2022 Hb 7.5  Hyponatremia  .. Na 11/24-11/26/2022 Na 130 - 135  Hyperkalemia .  .. k 11/26/2022 k 5.7       ASSESSMENT/RECOMMENDATIONS .   RESP.  .. Gas exchange.  11/29/2022 ac 18/350/8/80 747/45/64   11/21/2022 4a On 100% vent 759/36/273   Monitor & target po 90-95%  .. VENT MANAGEMENT.   HOB elevation  Target Pplat 30 (-)  Target PO 90-95%  Target pH 730 (+)  Daily spontaneous breathing trials   Daily sedation vacation   .. Pleural effsn .   CXR 11/21/2022 r gr than l effsn  Effusion has been tapped during prev admissions and is lp exudate If fever or worsening sepsis will plan thorac  RO VTE.  11/26 v duplx (-)   11/26/2022 check cta ch   11/29/2022 attempts made to trasport pat several times last few d but pt becomes unstable when tried to move to ct per staff   .. Bronchospasm.  11/21/2022 albut hfa 2p bid   INFECTION.  .. SCV2 status.  Scv2 11/21/2022 scv2 (-)  .. VAP   w 11/21-11/23-11/24-11/25-11/26-11/27-11/28-11/29/2022   w 16 - 8.8 - 12- 9 - 9.4- 9.1 - 11  - 9.1   Pr 11/21-11/22/2022 pr 1.8 - 1.1  ua 11/21/2022 w 3-5   cxr 11/21 mod to large r effsn somewhat incr from 11/12 central infiltrates possibly congestive rij   rvp 11/21/2022 (-)   rsv 11/21/2022 (-)   uc 11/21 (-)   legn 11/21 (-)   trach 11/21 mod acinetobacter carbapenem resist   mrsa 11/21 (-)   bc 11/21 (-)   11/21/2022 bactrim 285 mg trimeth q 8 h Dr BRITTANY Brantley DCED 11/25/2022 11/25/2022 Unasyn 3.4 x 7d Dr Chairez    CARDIAC.  .. Shock.    11/21/2022 midodrine 10.3 -> 11/22 midodr 15.3   11/21/2022 5a norepi 8 in 250   target map 65 (+)   .. ekg changes.  ekg 11/21/2022 s tach shoirt pr qtc 470 possible rvh   tr 11/22/2022 tr 28   GI.  .. Nutrition.   11/21/2022 glucerna 1.5 1000 gt    HEMAT.  .. DVT pplx.   11/21/2022 hpsc    .. anemia.  Hb 11/21-11/22-11/23-11/24-11/25-11/26-11/28-11/29/2022   Hb 9.6- 7.8 - 7.7 - 7.7- 7.5 - 7.5  - 8.7 - 8.1   mcv 11/21/2022 mcv 90   inr 11/21/2022 inr 122   monitor target Hb 7 (+)   RENAL.  .. Hyperkalemia .  k 11/26-11/27-11/29/2022 k 5.7 - 6 - 4.2    11/26/2022 lokelma dose   11/27/2022 lokelma 10.3x2d   ENDOCRINE.   .. DM   11/21/2022 glarg 8 q am   11/21/2022 riss   NEURO.  .. seizures.  11/21/2022 lorazepam 1.6     TIME SPENT   Over 39 minutes aggregate critical care time spent on encounter; activities included   direct patient care, counseling and/or coordinating care reviewing notes, lab data/ imaging , discussion with multidisciplinary team/ patient  /family and explaining in detail risks, benefits, alternatives  of the recommendations     CHAPINCITO GUIDRY 76 f [facility] S 11/21/2022   DR JOSEPH DU

## 2022-11-29 NOTE — PROGRESS NOTE ADULT - SUBJECTIVE AND OBJECTIVE BOX
Chief Complaint: Respiratory distress    Interval Events: No events overnight.    Review of Systems:  Unable to obtain    Physical Exam:  Vital Signs Last 24 Hrs  T(C): 37.4 (29 Nov 2022 03:26), Max: 38 (28 Nov 2022 12:51)  T(F): 99.3 (29 Nov 2022 03:26), Max: 100.4 (28 Nov 2022 12:51)  HR: 71 (29 Nov 2022 08:00) (64 - 86)  BP: 118/56 (29 Nov 2022 08:00) (105/50 - 138/60)  BP(mean): 75 (29 Nov 2022 08:00) (67 - 83)  RR: 27 (29 Nov 2022 08:00) (17 - 31)  SpO2: 98% (29 Nov 2022 08:00) (93% - 100%)  Parameters below as of 29 Nov 2022 08:00  Patient On (Oxygen Delivery Method): ventilator  O2 Concentration (%): 70  General: Unresponsive  HEENT: MMM  Neck: No JVD, no carotid bruit  Lungs: CTAB  CV: RRR, nl S1/S2, no M/R/G  Abdomen: S/NT/ND, +BS  Extremities: 1+ LE edema, no cyanosis  Neuro: AAOx0  Skin: No rash    Labs:    11-29    139  |  104  |  26<H>  ----------------------------<  118<H>  4.2   |  30  |  1.18    Ca    8.0<L>      29 Nov 2022 06:51  Phos  3.3     11-29  Mg     2.4     11-29    TPro  6.8  /  Alb  1.5<L>  /  TBili  0.6  /  DBili  x   /  AST  14  /  ALT  14  /  AlkPhos  111  11-29                        8.1    9.14  )-----------( 152      ( 29 Nov 2022 06:51 )             26.1       ECG/Telemetry: Sinus rhythm

## 2022-11-29 NOTE — PROGRESS NOTE ADULT - ASSESSMENT
78 y/o F pmhx of Dementia, COPD, chronic respiratory failure requiring trach and peg, now vent dependent, cardiac arrest, quadriplegia, CVA, and CKD presented to  ED w/ fevers and abnormal labs.    Assessment:  1. Acute on chronic hypoxic respiratory failure   2. Septic Shock, resolved  3. VAP, Acinetobacter baumannii.  4. Anoxic injury  s/p cardiac arrest       Plan:  Neuro: Not requiring sedation. At baseline mentation. d/c Precedex    CV: Shock, weaned off vasopressors.  C/w Midodrine 15mg TID, wean off as tolerated by BP.  TTE 8/22 w/ normal LV systolic function. Holding anti hypertensives in setting of shock.   Resp: Full Vent support on AC/VC, actively titrating FiO2 to maintain O2 sat >92%. Ultizing PEEP for alveolar recruitment. Currently on 100%/ +8. Low TV ventillation strategy.  Vent bundle in place. CXR in am   GI: PPI daily. Tolerating tube feeds  Renal: CKD at baseline. Monitoring I&Os. Trend BUN/Crt. electrolytes WNL   ID: sputum culture w/ Acinetobacter baumannii. On Unasyn. Blood cultures w/ NGTD. ID following   Heme: DVT ppx w/ heparin subq    76 y/o F pmhx of Dementia, COPD, chronic respiratory failure requiring trach and peg, now vent dependent, cardiac arrest, quadriplegia, CVA, and CKD presented to  ED w/ fevers and abnormal labs.    Assessment:  1. Acute on chronic hypoxic respiratory failure   2. Septic Shock, resolved  3. VAP, Acinetobacter baumannii.  4. Anoxic injury  s/p cardiac arrest       Plan:  Neuro: Not requiring sedation. At baseline mentation. d/c Precedex    CV: Shock, weaned off vasopressors.  C/w Midodrine 15mg TID, wean off as tolerated by BP.  TTE 8/22 w/ normal LV systolic function. Holding anti hypertensives in setting of shock. Send off biomarkers and limited TTE to eval RV, Dopplers negative for DVT   Resp: Full Vent support on AC/VC, actively titrating FiO2 to maintain O2 sat >92%. Ultizing PEEP for alveolar recruitment. Currently on 100%/ +8. Low TV ventillation strategy.  Vent bundle in place. CXR in am   GI: PPI daily. Tolerating tube feeds  Renal: CKD at baseline. Monitoring I&Os. Trend BUN/Crt. electrolytes WNL   ID: sputum culture w/ Acinetobacter baumannii. On Unasyn. Blood cultures w/ NGTD. ID following   Heme: DVT ppx w/ heparin subq

## 2022-11-29 NOTE — PROGRESS NOTE ADULT - SUBJECTIVE AND OBJECTIVE BOX
INTERVAL HPI/OVERNIGHT EVENTS:  No new overnight event.  No N/V/D.  Tolerating diet.  some leakage    Allergies    codeine (Hives)    Intolerances    General:  No wt loss, fevers, chills, night sweats, fatigue,   Eyes:  Good vision, no reported pain  ENT:  No sore throat, pain, runny nose, dysphagia  CV:  No pain, palpitations, hypo/hypertension  Resp:  No dyspnea, cough, tachypnea, wheezing  GI:  No pain, No nausea, No vomiting, No diarrhea, No constipation, No weight loss, No fever, No pruritis, No rectal bleeding, No tarry stools, No dysphagia,  :  No pain, bleeding, incontinence, nocturia  Muscle:  No pain, weakness  Neuro:  No weakness, tingling, memory problems  Psych:  No fatigue, insomnia, mood problems, depression  Endocrine:  No polyuria, polydipsia, cold/heat intolerance  Heme:  No petechiae, ecchymosis, easy bruisability  Skin:  No rash, tattoos, scars, edema      PHYSICAL EXAM:   Vital Signs:  Vital Signs Last 24 Hrs  T(C): 37.7 (2022 15:51), Max: 37.7 (2022 00:03)  T(F): 99.9 (2022 15:51), Max: 99.9 (2022 00:03)  HR: 76 (2022 17:00) (64 - 88)  BP: 113/54 (2022 17:00) (105/50 - 138/60)  BP(mean): 72 (2022 17:00) (67 - 83)  RR: 21 (2022 17:00) (11 - 39)  SpO2: 92% (2022 17:00) (90% - 100%)    Parameters below as of 2022 17:00  Patient On (Oxygen Delivery Method): ventilator    O2 Concentration (%): 80  Daily     Daily Weight in k.8 (2022 03:26)I&O's Summary    2022 07:01  -  2022 07:00  --------------------------------------------------------  IN: 2175 mL / OUT: 0 mL / NET: 2175 mL    2022 07:01  -  2022 17:20  --------------------------------------------------------  IN: 825 mL / OUT: 0 mL / NET: 825 mL        GENERAL:  Appears stated age, well-groomed, well-nourished, no distress  HEENT:  NC/AT,  conjunctivae clear and pink, no thyromegaly, nodules, adenopathy, no JVD, sclera -anicteric  CHEST:  Full & symmetric excursion, no increased effort, breath sounds clear  HEART:  Regular rhythm, S1, S2, no murmur/rub/S3/S4, no abdominal bruit, no edema  ABDOMEN:  Soft, non-tender, non-distended, normoactive bowel sounds,  no masses ,no hepato-splenomegaly, no signs of chronic liver disease  EXTEREMITIES:  no cyanosis,clubbing or edema  SKIN:  No rash/erythema/ecchymoses/petechiae/wounds/abscess/warm/dry  NEURO:  Alert, oriented, no asterixis, no tremor, no encephalopathy      LABS:                        8.1    9.14  )-----------( 152      ( 2022 06:51 )             26.1         139  |  104  |  26<H>  ----------------------------<  118<H>  4.2   |  30  |  1.18    Ca    8.0<L>      2022 06:51  Phos  3.3       Mg     2.4         TPro  6.8  /  Alb  1.5<L>  /  TBili  0.6  /  DBili  x   /  AST  14  /  ALT  14  /  AlkPhos  111          amylase   lipase  RADIOLOGY & ADDITIONAL TESTS:

## 2022-11-29 NOTE — PROGRESS NOTE ADULT - ASSESSMENT
77F with dementia, COPD with chronic respiratory failure s/p trach, cardiac arrest, CHH, quadriplegia, CVA, CKD, anemia, PEG tube, and multiple hospital admissions for respiratory distress presents to the ED BIBEMS from [facility] for diaphoresis and fever.       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

## 2022-11-29 NOTE — PROGRESS NOTE ADULT - ASSESSMENT
77F with dementia, COPD with chronic respiratory failure s/p trach, cardiac arrest, CHH, quadriplegia, CVA, CKD, anemia, PEG tube, and multiple hospital admissions for respiratory distress presents to the ED BIBEMS from [facility] for diaphoresis and fever.     Readmitted with sepsis with shock  2/2 recurent VAP.    Sputum cx 11/21 with Acinetobacter baumannii/nosocom group (Carbapenem Resistant) resistant to Bactrim  WBC better  Remains on pressors and on 100% fi02    Plan:  Cont Unasyn x 7 days until 12/1  Trend temps and cbc  Asp precautions  Aggressive pulm toileting  Isolation per infection control policy  Vent management per ICU  Wean off pressors as BP permits    Poor prognosis  Continued [facility]    Infectious Diseases will continue to follow. Please call with any questions.   Andressa Brantley M.D.  [facility] Division of Infectious Diseases [phone]

## 2022-11-29 NOTE — PROGRESS NOTE ADULT - SUBJECTIVE AND OBJECTIVE BOX
BREA BECKHAM     [facility]    Allergies    codeine (Hives)    Intolerances        PAST MEDICAL & SURGICAL HISTORY:  Dementia of frontal lobe type      Aphasic stroke      Diabetes mellitus      Respiratory failure      Hypertension      GERD (gastroesophageal reflux disease)      Constipation      Respiratory failure      CVA (cerebral vascular accident)      HTN (hypertension)      DM (diabetes mellitus)      Advanced dementia      COVID-19 virus detected      Quadriplegia      Pneumonia      Type II diabetes mellitus      Hx of appendectomy      Gastrostomy in place      Tracheostomy in place      Tracheostomy tube present      Feeding by G-tube          FAMILY HISTORY:  No pertinent family history in first degree relatives        Home Medications:  Admelog 100 units/mL injectable solution: injectable every 6 hours SS (21 Nov 2022 05:08)  albuterol 90 mcg/inh inhalation aerosol with adapter: 2  inhaled every 6 hours (21 Nov 2022 04:24)  Bacid (LAC) oral tablet: 2 tab(s) by gastrostomy tube once a day (21 Nov 2022 04:24)  bacitracin 500 units/g topical ointment: Apply topically to affected area once a day to knees (21 Nov 2022 04:24)  chlorhexidine 0.12% mucous membrane liquid: 15 milliliter(s) mucous membrane 2 times a day (21 Nov 2022 04:24)  Dakins Full Strength 0.5% topical solution: Apply topically to affected area 2 times a day then apply santyl and calcium alginate (21 Nov 2022 04:24)  Eucerin topical cream: Apply topically to affected area once a day bilateral feet (21 Nov 2022 04:24)  ferrous sulfate 325 mg (65 mg elemental iron) oral tablet: 1 tab(s) by gastrostomy tube once a day (21 Nov 2022 04:24)  insulin glargine 100 units/mL subcutaneous solution: 8 unit(s) subcutaneous once a day (in the morning) (21 Nov 2022 04:24)  ipratropium-albuterol 0.5 mg-2.5 mg/3 mL inhalation solution: 3 milliliter(s) inhaled every 6 hours (21 Nov 2022 04:24)  LORazepam 1 mg oral tablet: 1 tab(s) by gastrostomy tube every 4 hours (21 Nov 2022 04:24)  midodrine 10 mg oral tablet: 1 tab(s) by gastrostomy tube every 8 hours (21 Nov 2022 04:24)  MiraLax oral powder for reconstitution: orally once a day (at bedtime) (21 Nov 2022 05:08)  mulivitamin: by gastrostomy tube once a day (21 Nov 2022 04:24)  nystatin 100,000 units/g topical powder: 1 application topically 3 times a day (21 Nov 2022 04:24)  omeprazole 40 mg oral delayed release capsule: 1 cap(s) by gastrostomy tube 2 times a day (21 Nov 2022 04:24)  polyethylene glycol 3350 oral powder for reconstitution: 17 gram(s) by gastrostomy tube every 12 hours (21 Nov 2022 04:24)  senna 8.6 mg oral tablet: 3 tab(s) by gastrostomy tube once a day (at bedtime) (21 Nov 2022 04:24)  simethicone 80 mg oral tablet, chewable: 1 tab(s) by gastrostomy tube every 6 hours (21 Nov 2022 04:24)  sucralfate 1 g/10 mL oral suspension: 10 milliliter(s) g-tube 4 times a day (before meals and at bedtime) (21 Nov 2022 04:24)  Tylenol 325 mg oral tablet: 2 tab(s) orally every 6 hours, As Needed prior to dressing change (21 Nov 2022 04:24)      MEDICATIONS  (STANDING):  albuterol    90 MICROgram(s) HFA Inhaler 2 Puff(s) Inhalation two times a day  ampicillin/sulbactam  IVPB 3 Gram(s) IV Intermittent every 6 hours  chlorhexidine 2% Cloths 1 Application(s) Topical daily  dexMEDEtomidine Infusion 0.5 MICROgram(s)/kG/Hr (7.14 mL/Hr) IV Continuous <Continuous>  dextrose 5%. 1000 milliLiter(s) (50 mL/Hr) IV Continuous <Continuous>  dextrose 5%. 1000 milliLiter(s) (100 mL/Hr) IV Continuous <Continuous>  dextrose 50% Injectable 25 Gram(s) IV Push once  dextrose 50% Injectable 12.5 Gram(s) IV Push once  dextrose 50% Injectable 25 Gram(s) IV Push once  glucagon  Injectable 1 milliGRAM(s) IntraMuscular once  heparin   Injectable 5000 Unit(s) SubCutaneous every 12 hours  insulin glargine Injectable (LANTUS) 8 Unit(s) SubCutaneous every morning  insulin lispro (ADMELOG) corrective regimen sliding scale   SubCutaneous every 6 hours  LORazepam     Tablet 1 milliGRAM(s) Oral every 4 hours  midodrine 15 milliGRAM(s) Oral every 8 hours  norepinephrine Infusion 0.05 MICROgram(s)/kG/Min (5.35 mL/Hr) IV Continuous <Continuous>  pantoprazole  Injectable 40 milliGRAM(s) IV Push daily  polyethylene glycol 3350 17 Gram(s) Oral every 12 hours  povidone iodine 10% Solution 1 Application(s) Topical daily  senna 2 Tablet(s) Oral at bedtime  sodium chloride 0.9%. 1000 milliLiter(s) (100 mL/Hr) IV Continuous <Continuous>    MEDICATIONS  (PRN):  dextrose Oral Gel 15 Gram(s) Oral once PRN Blood Glucose LESS THAN 70 milliGRAM(s)/deciliter  LORazepam   Injectable 2 milliGRAM(s) IV Push every 6 hours PRN Agitation  sodium chloride 0.9% lock flush 10 milliLiter(s) IV Push every 1 hour PRN Pre/post blood products, medications, blood draw, and to maintain line patency      Diet, NPO with Tube Feed:   Tube Feeding Modality: Gastrostomy  Glucerna 1.5 Horacio  Total Volume for 24 Hours (mL): 1000  Continuous  Starting Tube Feed Rate mL per Hour: 10  Increase Tube Feed Rate by (mL): 10     Every 10 hours  Until Goal Tube Feed Rate (mL per Hour): 50  Tube Feed Duration (in Hours): 20  Tube Feed Start Time: 17:00  Free Water Flush  Pump   Rate (mL per Hour): 25   Frequency: Every Hour    Duration (Hours): 24 (11-21-22 @ 05:10) [Active]          Vital Signs Last 24 Hrs  T(C): 37.4 (29 Nov 2022 03:26), Max: 38 (28 Nov 2022 12:51)  T(F): 99.3 (29 Nov 2022 03:26), Max: 100.4 (28 Nov 2022 12:51)  HR: 64 (29 Nov 2022 06:00) (64 - 86)  BP: 113/51 (29 Nov 2022 06:00) (105/50 - 138/60)  BP(mean): 70 (29 Nov 2022 06:00) (67 - 83)  RR: 21 (29 Nov 2022 06:00) (17 - 35)  SpO2: 97% (29 Nov 2022 06:00) (93% - 100%)    Parameters below as of 29 Nov 2022 06:00  Patient On (Oxygen Delivery Method): ventilator          11-27-22 @ 07:01  -  11-28-22 @ 07:00  --------------------------------------------------------  IN: 2303.5 mL / OUT: 1050 mL / NET: 1253.5 mL    11-28-22 @ 07:01  -  11-29-22 @ 06:45  --------------------------------------------------------  IN: 2175 mL / OUT: 0 mL / NET: 2175 mL        Mode: AC/ CMV (Assist Control/ Continuous Mandatory Ventilation), RR (machine): 18, TV (machine): 350, FiO2: 100, PEEP: 8, ITime: 1, MAP: 18, PIP: 41      LABS:                        8.7    11.08 )-----------( 167      ( 28 Nov 2022 07:08 )             27.3     11-28    137  |  102  |  29<H>  ----------------------------<  153<H>  5.3   |  30  |  1.20    Ca    8.0<L>      28 Nov 2022 07:08  Phos  3.3     11-27  Mg     2.4     11-27    TPro  6.8  /  Alb  1.7<L>  /  TBili  1.0  /  DBili  x   /  AST  24  /  ALT  15  /  AlkPhos  119  11-28          ABG - ( 29 Nov 2022 06:36 )  pH, Arterial: 7.47  pH, Blood: x     /  pCO2: 45    /  pO2: 64    / HCO3: 33    / Base Excess: 9.1   /  SaO2: 94.6                WBC:  WBC Count: 11.08 K/uL (11-28 @ 07:08)  WBC Count: 9.55 K/uL (11-27 @ 20:10)  WBC Count: 9.13 K/uL (11-27 @ 06:35)  WBC Count: 9.41 K/uL (11-26 @ 06:09)      MICROBIOLOGY:  RECENT CULTURES:                  Sodium:  Sodium, Serum: 137 mmol/L (11-28 @ 07:08)  Sodium, Serum: 137 mmol/L (11-27 @ 20:10)  Sodium, Serum: 137 mmol/L (11-27 @ 06:35)  Sodium, Serum: 135 mmol/L (11-26 @ 06:09)      1.20 mg/dL 11-28 @ 07:08  1.20 mg/dL 11-27 @ 20:10  1.17 mg/dL 11-27 @ 06:35  1.26 mg/dL 11-26 @ 06:09      Hemoglobin:  Hemoglobin: 8.7 g/dL (11-28 @ 07:08)  Hemoglobin: 6.7 g/dL (11-27 @ 20:10)  Hemoglobin: 7.2 g/dL (11-27 @ 06:35)  Hemoglobin: 7.5 g/dL (11-26 @ 06:09)      Platelets: Platelet Count - Automated: 167 K/uL (11-28 @ 07:08)  Platelet Count - Automated: 160 K/uL (11-27 @ 20:10)  Platelet Count - Automated: 176 K/uL (11-27 @ 06:35)  Platelet Count - Automated: 193 K/uL (11-26 @ 06:09)      LIVER FUNCTIONS - ( 28 Nov 2022 07:08 )  Alb: 1.7 g/dL / Pro: 6.8 g/dL / ALK PHOS: 119 U/L / ALT: 15 U/L DA / AST: 24 U/L / GGT: x                 RADIOLOGY & ADDITIONAL STUDIES:      MICROBIOLOGY:  RECENT CULTURES:

## 2022-11-29 NOTE — PROGRESS NOTE ADULT - SUBJECTIVE AND OBJECTIVE BOX
Patient is a 77y old  Female who presents with a chief complaint of diaphoresis and fever (29 Nov 2022 17:20)      BRIEF HOSPITAL COURSE: 78 y/o F pmhx of Dementia, COPD, chronic respiratory failure requiring trach and peg, now vent dependent, cardiac arrest, quadriplegia, CVA, and CKD presented to  ED w/ fevers and abnormal labs. Found to have a elevated WBC w/ L shift, febrile and CXR w/ worsening R infiltrates. Admitted for septic shock in setting of suspected VAP. Sputum cultures positive for Acinetobacter baumannii.    Events last 24 hours:  High FiO2 requirements, on 100%/ PEEP 8.  Remains off vasopressors. Unresponsive     PAST MEDICAL & SURGICAL HISTORY:  Dementia of frontal lobe type      Aphasic stroke      Diabetes mellitus      Respiratory failure      Hypertension      GERD (gastroesophageal reflux disease)      Constipation      Respiratory failure      CVA (cerebral vascular accident)      HTN (hypertension)      DM (diabetes mellitus)      Advanced dementia      COVID-19 virus detected      Quadriplegia      Pneumonia      Type II diabetes mellitus      Hx of appendectomy      Gastrostomy in place      Tracheostomy in place      Tracheostomy tube present      Feeding by G-tube          Review of Systems:  Unable to obtain due to clinical condition     Medications:  ampicillin/sulbactam  IVPB 3 Gram(s) IV Intermittent every 6 hours  midodrine 15 milliGRAM(s) Oral every 8 hours  norepinephrine Infusion 0.05 MICROgram(s)/kG/Min IV Continuous <Continuous>  albuterol    90 MICROgram(s) HFA Inhaler 2 Puff(s) Inhalation two times a day  dexMEDEtomidine Infusion 0.5 MICROgram(s)/kG/Hr IV Continuous <Continuous>  LORazepam     Tablet 1 milliGRAM(s) Oral every 4 hours  LORazepam   Injectable 2 milliGRAM(s) IV Push every 6 hours PRN  heparin   Injectable 5000 Unit(s) SubCutaneous every 12 hours  pantoprazole  Injectable 40 milliGRAM(s) IV Push daily  polyethylene glycol 3350 17 Gram(s) Oral every 12 hours  senna 2 Tablet(s) Oral at bedtime      dextrose 50% Injectable 25 Gram(s) IV Push once  dextrose 50% Injectable 12.5 Gram(s) IV Push once  dextrose 50% Injectable 25 Gram(s) IV Push once  dextrose Oral Gel 15 Gram(s) Oral once PRN  glucagon  Injectable 1 milliGRAM(s) IntraMuscular once  insulin glargine Injectable (LANTUS) 8 Unit(s) SubCutaneous every morning  insulin lispro (ADMELOG) corrective regimen sliding scale   SubCutaneous every 6 hours    dextrose 5%. 1000 milliLiter(s) IV Continuous <Continuous>  dextrose 5%. 1000 milliLiter(s) IV Continuous <Continuous>  sodium chloride 0.9% lock flush 10 milliLiter(s) IV Push every 1 hour PRN  sodium chloride 0.9%. 1000 milliLiter(s) IV Continuous <Continuous>      chlorhexidine 2% Cloths 1 Application(s) Topical daily  povidone iodine 10% Solution 1 Application(s) Topical daily        Mode: AC/ CMV (Assist Control/ Continuous Mandatory Ventilation)  RR (machine): 18  TV (machine): 350  FiO2: 100  PEEP: 8.2  ITime: 1  MAP: 16  PIP: 46      ICU Vital Signs Last 24 Hrs  T(C): 37.7 (29 Nov 2022 15:51), Max: 37.7 (29 Nov 2022 00:03)  T(F): 99.9 (29 Nov 2022 15:51), Max: 99.9 (29 Nov 2022 00:03)  HR: 73 (29 Nov 2022 19:00) (64 - 88)  BP: 138/64 (29 Nov 2022 19:00) (105/50 - 138/64)  BP(mean): 83 (29 Nov 2022 19:00) (67 - 83)  ABP: --  ABP(mean): --  RR: 26 (29 Nov 2022 19:00) (11 - 39)  SpO2: 92% (29 Nov 2022 19:00) (90% - 100%)    O2 Parameters below as of 29 Nov 2022 19:00  Patient On (Oxygen Delivery Method): ventilator    O2 Concentration (%): 100        ABG - ( 29 Nov 2022 06:36 )  pH, Arterial: 7.47  pH, Blood: x     /  pCO2: 45    /  pO2: 64    / HCO3: 33    / Base Excess: 9.1   /  SaO2: 94.6                I&O's Detail    28 Nov 2022 07:01  -  29 Nov 2022 07:00  --------------------------------------------------------  IN:    Enteral Tube Flush: 575 mL    Glucerna 1.5: 1200 mL    IV PiggyBack: 400 mL  Total IN: 2175 mL    OUT:  Total OUT: 0 mL    Total NET: 2175 mL      29 Nov 2022 07:01  -  29 Nov 2022 19:49  --------------------------------------------------------  IN:    Enteral Tube Flush: 300 mL    Glucerna 1.5: 600 mL  Total IN: 900 mL    OUT:  Total OUT: 0 mL    Total NET: 900 mL            LABS:                        8.1    9.14  )-----------( 152      ( 29 Nov 2022 06:51 )             26.1     11-29    139  |  104  |  26<H>  ----------------------------<  118<H>  4.2   |  30  |  1.18    Ca    8.0<L>      29 Nov 2022 06:51  Phos  3.3     11-29  Mg     2.4     11-29    TPro  6.8  /  Alb  1.5<L>  /  TBili  0.6  /  DBili  x   /  AST  14  /  ALT  14  /  AlkPhos  111  11-29          CAPILLARY BLOOD GLUCOSE      POCT Blood Glucose.: 185 mg/dL (29 Nov 2022 17:31)        CULTURES:      Physical Examination:    General: Unresponsive.     HEENT: Pupils equal, reactive to light.  Symmetric. + Trach     PULM: Coarse ronchi b/l. No wheeze.     CVS: Regular rate and rhythm, no murmurs, rubs, or gallops    ABD: Soft, nondistended, nontender, normoactive bowel sounds, no masses. PEG    EXT: diffuse anasarca     SKIN: Warm and well perfused, no rashes noted.    NEURO: Does not follow commands. Unresponsive     RADIOLOGY: < from: US Duplex Venous Lower Ext Complete, Bilateral (11.26.22 @ 23:01) >    ACC: [id number] EXAM:  US DPLX LWR EXT VEINS COMPL BI                          PROCEDURE DATE:  11/26/2022          INTERPRETATION:  CLINICAL INFORMATION: Bilateral leg swelling    COMPARISON: Venous Doppler 11/12/2022    TECHNIQUE: Duplex sonographyof the BILATERAL LOWER extremity veins with   color and spectral Doppler, with and without compression.    FINDINGS:    RIGHT:  Normal compressibility of the RIGHT common femoral, femoral and popliteal   veins.  Doppler examination shows normal spontaneous and phasic flow.  No RIGHT calf vein thrombosis is detected.    LEFT:  Normal compressibility of the LEFT common femoral, femoral and popliteal   veins.  Doppler examination shows normal spontaneous and phasic flow.  No LEFT calf vein thrombosis is detected.    IMPRESSION:  No evidence of deep venous thrombosis in either lower extremity.    --- End of Report ---    < end of copied text >      CRITICAL CARE TIME SPENT:  35 min  Evaluating/treating patient, reviewing data/labs/imaging, discussing case with multidisciplinary team, discussing plan/goals of care with patient/family. Non-inclusive of procedure time.

## 2022-11-29 NOTE — PROGRESS NOTE ADULT - SUBJECTIVE AND OBJECTIVE BOX
Patient is a 77y old  Female who presents with a chief complaint of diaphoresis and fever (29 Nov 2022 10:21)      INTERVAL HPI/OVERNIGHT EVENTS: Patient seen and examined at bedside. No overnight events    MEDICATIONS  (STANDING):  albuterol    90 MICROgram(s) HFA Inhaler 2 Puff(s) Inhalation two times a day  ampicillin/sulbactam  IVPB 3 Gram(s) IV Intermittent every 6 hours  chlorhexidine 2% Cloths 1 Application(s) Topical daily  dexMEDEtomidine Infusion 0.5 MICROgram(s)/kG/Hr (7.14 mL/Hr) IV Continuous <Continuous>  dextrose 5%. 1000 milliLiter(s) (50 mL/Hr) IV Continuous <Continuous>  dextrose 5%. 1000 milliLiter(s) (100 mL/Hr) IV Continuous <Continuous>  dextrose 50% Injectable 25 Gram(s) IV Push once  dextrose 50% Injectable 12.5 Gram(s) IV Push once  dextrose 50% Injectable 25 Gram(s) IV Push once  glucagon  Injectable 1 milliGRAM(s) IntraMuscular once  heparin   Injectable 5000 Unit(s) SubCutaneous every 12 hours  insulin glargine Injectable (LANTUS) 8 Unit(s) SubCutaneous every morning  insulin lispro (ADMELOG) corrective regimen sliding scale   SubCutaneous every 6 hours  LORazepam     Tablet 1 milliGRAM(s) Oral every 4 hours  midodrine 15 milliGRAM(s) Oral every 8 hours  norepinephrine Infusion 0.05 MICROgram(s)/kG/Min (5.35 mL/Hr) IV Continuous <Continuous>  pantoprazole  Injectable 40 milliGRAM(s) IV Push daily  polyethylene glycol 3350 17 Gram(s) Oral every 12 hours  povidone iodine 10% Solution 1 Application(s) Topical daily  senna 2 Tablet(s) Oral at bedtime  sodium chloride 0.9%. 1000 milliLiter(s) (100 mL/Hr) IV Continuous <Continuous>    MEDICATIONS  (PRN):  dextrose Oral Gel 15 Gram(s) Oral once PRN Blood Glucose LESS THAN 70 milliGRAM(s)/deciliter  LORazepam   Injectable 2 milliGRAM(s) IV Push every 6 hours PRN Agitation  sodium chloride 0.9% lock flush 10 milliLiter(s) IV Push every 1 hour PRN Pre/post blood products, medications, blood draw, and to maintain line patency      Allergies    codeine (Hives)    Intolerances        REVIEW OF SYSTEMS:  Unable to obtain meaningful ROS due to mental status      Vital Signs Last 24 Hrs  T(C): 37.4 (29 Nov 2022 03:26), Max: 38 (28 Nov 2022 12:51)  T(F): 99.3 (29 Nov 2022 03:26), Max: 100.4 (28 Nov 2022 12:51)  HR: 70 (29 Nov 2022 09:00) (64 - 86)  BP: 113/53 (29 Nov 2022 09:00) (105/50 - 138/60)  BP(mean): 72 (29 Nov 2022 09:00) (67 - 83)  RR: 17 (29 Nov 2022 09:00) (17 - 30)  SpO2: 95% (29 Nov 2022 09:00) (93% - 100%)    Parameters below as of 29 Nov 2022 09:00  Patient On (Oxygen Delivery Method): ventilator    O2 Concentration (%): 70    PHYSICAL EXAM:  GENERAL: chronically ill appearing, emaciated, NAD, obtunded  HEAD: atraumatic  EYES: conjunctiva clear  NECK: (+)trach in place, clean  RESPIRATORY: mechanical breath sounds, diminished, vent in place, no gross crackles or rales  CARDIOVASCULAR:  regular rate and rhythm, no murmurs or rubs or gallops, 2+ peripheral pulses  GASTROINTESTINAL:  soft, +PEG in place, nondistended, bowel sounds present  EXTREMITIES: no clubbing or cyanosis or edema  MUSCULOSKELETAL: (+)diffuse contractures in all joints  NERVOUS SYSTEM: does not respond to pain    LABS:                        8.1    9.14  )-----------( 152      ( 29 Nov 2022 06:51 )             26.1     CBC Full  -  ( 29 Nov 2022 06:51 )  WBC Count : 9.14 K/uL  Hemoglobin : 8.1 g/dL  Hematocrit : 26.1 %  Platelet Count - Automated : 152 K/uL  Mean Cell Volume : 89.7 fl  Mean Cell Hemoglobin : 27.8 pg  Mean Cell Hemoglobin Concentration : 31.0 gm/dL  Auto Neutrophil # : x  Auto Lymphocyte # : x  Auto Monocyte # : x  Auto Eosinophil # : x  Auto Basophil # : x  Auto Neutrophil % : x  Auto Lymphocyte % : x  Auto Monocyte % : x  Auto Eosinophil % : x  Auto Basophil % : x    29 Nov 2022 06:51    139    |  104    |  26     ----------------------------<  118    4.2     |  30     |  1.18     Ca    8.0        29 Nov 2022 06:51  Phos  3.3       29 Nov 2022 06:51  Mg     2.4       29 Nov 2022 06:51    TPro  6.8    /  Alb  1.5    /  TBili  0.6    /  DBili  x      /  AST  14     /  ALT  14     /  AlkPhos  111    29 Nov 2022 06:51        CAPILLARY BLOOD GLUCOSE      POCT Blood Glucose.: 131 mg/dL (29 Nov 2022 05:15)  POCT Blood Glucose.: 140 mg/dL (28 Nov 2022 23:40)  POCT Blood Glucose.: 186 mg/dL (28 Nov 2022 17:35)  POCT Blood Glucose.: 170 mg/dL (28 Nov 2022 11:27)          RADIOLOGY & ADDITIONAL TESTS: < from: US Duplex Venous Lower Ext Complete, Bilateral (11.26.22 @ 23:01) >    ACC: [id number] EXAM:  US DPLX LWR EXT VEINS COMPL BI                          PROCEDURE DATE:  11/26/2022          INTERPRETATION:  CLINICAL INFORMATION: Bilateral leg swelling    COMPARISON: Venous Doppler 11/12/2022    TECHNIQUE: Duplex sonographyof the BILATERAL LOWER extremity veins with   color and spectral Doppler, with and without compression.    FINDINGS:    RIGHT:  Normal compressibility of the RIGHT common femoral, femoral and popliteal   veins.  Doppler examination shows normal spontaneous and phasic flow.  No RIGHT calf vein thrombosis is detected.    LEFT:  Normal compressibility of the LEFT common femoral, femoral and popliteal   veins.  Doppler examination shows normal spontaneous and phasic flow.  No LEFT calf vein thrombosis is detected.    IMPRESSION:  No evidence of deep venous thrombosis in either lower extremity.    --- End of Report ---            HARLEY WILCOX MD; Attending Radiologist  This document has been electronically signed. Nov 26 2022 11:06PM    < end of copied text >      Consultant(s) Notes Reviewed:  [x] YES  [ ] NO    Care Discussed with [x] Consultants  [x] Patient  [ ] Family  [ ]      [ x] Other; RN  DVT ppx

## 2022-11-29 NOTE — PROGRESS NOTE ADULT - ASSESSMENT
77F with dementia, COPD with chronic respiratory failure s/p trach, cardiac arrest, CHH, quadriplegia, CVA, CKD, anemia, PEG tube, and multiple hospital admissions for respiratory distress presents to the ED [facility] from [facility] for diaphoresis and fever.         ANemia  multifacotrial/ AICD   s/p 1 unit prbc since admission   HH improved   continue supportive care    Sepsis 2/2 VAP - meets sepsis based on fever + tachycardia + source of infection.  Lactate 1.9  peristent hypotension  on pressors   Cont Unasyn x 7 days until 12/1  - cont with vent settings to maintain SaO2 >92%  - cont with midodrine 15mg TID, pressors PRN  - on ativan for agitation  - MRSA neg, trach culture +acinetobacter   wean as tolerating   Trend temps and cbc    Hyperkalemia  resolved  s/p lokelma     Hyponatremia  - Na of 130 today, likely hypovolemic  - urine lytes ordered  - continue to trend    Leukocytosis  - WBC WNL  - no fevers  - was on bactrim, changed to unasyn  - continue to trend     DM2 on insulin  - cont with lantus and insulin coverage scale with FS q6h while on TF    COPD   - cont with neb treatments    Anemia of chronic disease   - GI eval - conservative management   - cont with ferrous sulfate  - trend CBC    Prognosis grave, patient remains critically ill and is at high risk for abrupt decompensation and death

## 2022-11-29 NOTE — PROGRESS NOTE ADULT - SUBJECTIVE AND OBJECTIVE BOX
[facility], Division of Infectious Diseases  MOIZ Lora Y. Patel, S. Shah, [facility]  [phone]    Name: BREA BECKHAM  Age: 77y  Gender: Female  MRN: [id number]    Interval History:  Patient seen and examined at bedside in SPCU  Afebrile overnight  Notes reviewed    Antibiotics:  ampicillin/sulbactam  IVPB 3 Gram(s) IV Intermittent every 6 hours      Medications:  albuterol    90 MICROgram(s) HFA Inhaler 2 Puff(s) Inhalation two times a day  ampicillin/sulbactam  IVPB 3 Gram(s) IV Intermittent every 6 hours  chlorhexidine 2% Cloths 1 Application(s) Topical daily  dexMEDEtomidine Infusion 0.5 MICROgram(s)/kG/Hr IV Continuous <Continuous>  dextrose 5%. 1000 milliLiter(s) IV Continuous <Continuous>  dextrose 5%. 1000 milliLiter(s) IV Continuous <Continuous>  dextrose 50% Injectable 25 Gram(s) IV Push once  dextrose 50% Injectable 12.5 Gram(s) IV Push once  dextrose 50% Injectable 25 Gram(s) IV Push once  dextrose Oral Gel 15 Gram(s) Oral once PRN  glucagon  Injectable 1 milliGRAM(s) IntraMuscular once  heparin   Injectable 5000 Unit(s) SubCutaneous every 12 hours  insulin glargine Injectable (LANTUS) 8 Unit(s) SubCutaneous every morning  insulin lispro (ADMELOG) corrective regimen sliding scale   SubCutaneous every 6 hours  LORazepam     Tablet 1 milliGRAM(s) Oral every 4 hours  LORazepam   Injectable 2 milliGRAM(s) IV Push every 6 hours PRN  midodrine 15 milliGRAM(s) Oral every 8 hours  norepinephrine Infusion 0.05 MICROgram(s)/kG/Min IV Continuous <Continuous>  pantoprazole  Injectable 40 milliGRAM(s) IV Push daily  polyethylene glycol 3350 17 Gram(s) Oral every 12 hours  povidone iodine 10% Solution 1 Application(s) Topical daily  senna 2 Tablet(s) Oral at bedtime  sodium chloride 0.9% lock flush 10 milliLiter(s) IV Push every 1 hour PRN  sodium chloride 0.9%. 1000 milliLiter(s) IV Continuous <Continuous>  sodium zirconium cyclosilicate 10 Gram(s) Oral three times a day      Review of Systems:  unable to obtain    Allergies: codeine (Hives)    For details regarding the patient's past medical history, social history, family history, and other miscellaneous elements, please refer the initial infectious diseases consultation and/or the admitting history and physical examination for this admission.    Objective:  Vital Signs Last 24 Hrs  T(C): 37.4 (29 Nov 2022 03:26), Max: 38 (28 Nov 2022 12:51)  T(F): 99.3 (29 Nov 2022 03:26), Max: 100.4 (28 Nov 2022 12:51)  HR: 70 (29 Nov 2022 09:00) (64 - 86)  BP: 113/53 (29 Nov 2022 09:00) (105/50 - 138/60)  BP(mean): 72 (29 Nov 2022 09:00) (67 - 83)  RR: 17 (29 Nov 2022 09:00) (17 - 31)  SpO2: 95% (29 Nov 2022 09:00) (93% - 100%)    Parameters below as of 29 Nov 2022 09:00  Patient On (Oxygen Delivery Method): ventilator    O2 Concentration (%): 70    Physical Examination:  General: no acute distress  HEENT: NC/AT, EOMI  Neck: trach  Cardio: S1, S2 heard, RRR, no murmurs  Resp: MV breath sounds  Abd: distended  Ext: no edema or cyanosis  Skin: warm, dry, no visible rash      Laboratory Studies:                        8.1    9.14  )-----------( 152      ( 29 Nov 2022 06:51 )             26.1   11-29    139  |  104  |  26<H>  ----------------------------<  118<H>  4.2   |  30  |  1.18    Ca    8.0<L>      29 Nov 2022 06:51  Phos  3.3     11-29  Mg     2.4     11-29    TPro  6.8  /  Alb  1.5<L>  /  TBili  0.6  /  DBili  x   /  AST  14  /  ALT  14  /  AlkPhos  111  11-29        Microbiology: reviewed    Culture - Sputum (collected 11-21-22 @ 20:14)  Source: Trach Asp Tracheal Aspirate  Gram Stain (11-22-22 @ 08:43):    Moderate polymorphonuclear leukocytes per low power field    No Squamous epithelial cells per low power field    No organisms seen  Final Report (11-24-22 @ 17:55):    Moderate Acinetobacter baumannii/nosocom group (Carbapenem Resistant)    Normal Respiratory Elvira present  Organism: Acinetobacter baumannii/nosocom group (Carbapenem Resistant) (11-24-22 @ 17:55)  Organism: Acinetobacter baumannii/nosocom group (Carbapenem Resistant) (11-24-22 @ 17:55)      -  Polymyxin B: I 0.25      Method Type: ETEST  Organism: Acinetobacter baumannii/nosocom group (Carbapenem Resistant) (11-24-22 @ 17:55)      -  Minocycline: R      -  Piperacillin/Tazobactam: R      Method Type: KB  Organism: Acinetobacter baumannii/nosocom group (Carbapenem Resistant) (11-24-22 @ 17:55)      -  Amikacin: S <=16      -  Ampicillin/Sulbactam: S 8/4      -  Cefepime: R >16      -  Ceftazidime: R >16      -  Ciprofloxacin: R >2      -  Gentamicin: I 8      -  Imipenem: R 8      -  Levofloxacin: R >4      -  Meropenem: R >8      -  Tobramycin: R >8      -  Trimethoprim/Sulfamethoxazole: R >2/38      Method Type: PRATIK    Culture - Urine (collected 11-21-22 @ 04:25)  Source: Clean Catch Clean Catch (Midstream)  Final Report (11-22-22 @ 09:39):    <10,000 CFU/mL Normal Urogenital Elvira    Culture - Blood (collected 11-21-22 @ 03:56)  Source: .Blood Blood-Peripheral  Final Report (11-26-22 @ 14:01):    No Growth Final    Culture - Blood (collected 11-21-22 @ 03:56)  Source: .Blood Blood-Peripheral  Final Report (11-26-22 @ 14:00):    No Growth Final          Radiology: reviewed

## 2022-11-30 LAB
ANION GAP SERPL CALC-SCNC: 8 MMOL/L — SIGNIFICANT CHANGE UP (ref 5–17)
BUN SERPL-MCNC: 25 MG/DL — HIGH (ref 7–23)
CALCIUM SERPL-MCNC: 8.4 MG/DL — SIGNIFICANT CHANGE UP (ref 8.4–10.5)
CHLORIDE SERPL-SCNC: 101 MMOL/L — SIGNIFICANT CHANGE UP (ref 96–108)
CO2 SERPL-SCNC: 30 MMOL/L — SIGNIFICANT CHANGE UP (ref 22–31)
CREAT SERPL-MCNC: 1.14 MG/DL — SIGNIFICANT CHANGE UP (ref 0.5–1.3)
EGFR: 49 ML/MIN/1.73M2 — LOW
GLUCOSE SERPL-MCNC: 157 MG/DL — HIGH (ref 70–99)
HCT VFR BLD CALC: 30.8 % — LOW (ref 34.5–45)
HGB BLD-MCNC: 9.2 G/DL — LOW (ref 11.5–15.5)
MCHC RBC-ENTMCNC: 27.8 PG — SIGNIFICANT CHANGE UP (ref 27–34)
MCHC RBC-ENTMCNC: 29.9 GM/DL — LOW (ref 32–36)
MCV RBC AUTO: 93.1 FL — SIGNIFICANT CHANGE UP (ref 80–100)
NRBC # BLD: 0 /100 WBCS — SIGNIFICANT CHANGE UP (ref 0–0)
NT-PROBNP SERPL-SCNC: HIGH PG/ML (ref 0–450)
PLATELET # BLD AUTO: 175 K/UL — SIGNIFICANT CHANGE UP (ref 150–400)
POTASSIUM SERPL-MCNC: 4.4 MMOL/L — SIGNIFICANT CHANGE UP (ref 3.5–5.3)
POTASSIUM SERPL-SCNC: 4.4 MMOL/L — SIGNIFICANT CHANGE UP (ref 3.5–5.3)
RBC # BLD: 3.31 M/UL — LOW (ref 3.8–5.2)
RBC # FLD: 16.7 % — HIGH (ref 10.3–14.5)
SODIUM SERPL-SCNC: 139 MMOL/L — SIGNIFICANT CHANGE UP (ref 135–145)
TROPONIN I, HIGH SENSITIVITY RESULT: 22.9 NG/L — SIGNIFICANT CHANGE UP
WBC # BLD: 12.9 K/UL — HIGH (ref 3.8–10.5)
WBC # FLD AUTO: 12.9 K/UL — HIGH (ref 3.8–10.5)

## 2022-11-30 PROCEDURE — 99232 SBSQ HOSP IP/OBS MODERATE 35: CPT

## 2022-11-30 PROCEDURE — 93306 TTE W/DOPPLER COMPLETE: CPT | Mod: 26

## 2022-11-30 RX ADMIN — Medication 2: at 12:40

## 2022-11-30 RX ADMIN — CHLORHEXIDINE GLUCONATE 1 APPLICATION(S): 213 SOLUTION TOPICAL at 06:48

## 2022-11-30 RX ADMIN — MIDODRINE HYDROCHLORIDE 15 MILLIGRAM(S): 2.5 TABLET ORAL at 21:42

## 2022-11-30 RX ADMIN — Medication 1 MILLIGRAM(S): at 01:34

## 2022-11-30 RX ADMIN — MIDODRINE HYDROCHLORIDE 15 MILLIGRAM(S): 2.5 TABLET ORAL at 14:21

## 2022-11-30 RX ADMIN — AMPICILLIN SODIUM AND SULBACTAM SODIUM 200 GRAM(S): 250; 125 INJECTION, POWDER, FOR SUSPENSION INTRAMUSCULAR; INTRAVENOUS at 23:43

## 2022-11-30 RX ADMIN — HEPARIN SODIUM 5000 UNIT(S): 5000 INJECTION INTRAVENOUS; SUBCUTANEOUS at 17:44

## 2022-11-30 RX ADMIN — HEPARIN SODIUM 5000 UNIT(S): 5000 INJECTION INTRAVENOUS; SUBCUTANEOUS at 06:47

## 2022-11-30 RX ADMIN — AMPICILLIN SODIUM AND SULBACTAM SODIUM 200 GRAM(S): 250; 125 INJECTION, POWDER, FOR SUSPENSION INTRAMUSCULAR; INTRAVENOUS at 17:43

## 2022-11-30 RX ADMIN — MIDODRINE HYDROCHLORIDE 15 MILLIGRAM(S): 2.5 TABLET ORAL at 06:47

## 2022-11-30 RX ADMIN — INSULIN GLARGINE 8 UNIT(S): 100 INJECTION, SOLUTION SUBCUTANEOUS at 07:56

## 2022-11-30 RX ADMIN — PANTOPRAZOLE SODIUM 40 MILLIGRAM(S): 20 TABLET, DELAYED RELEASE ORAL at 11:23

## 2022-11-30 RX ADMIN — ALBUTEROL 2 PUFF(S): 90 AEROSOL, METERED ORAL at 20:44

## 2022-11-30 RX ADMIN — Medication 1 MILLIGRAM(S): at 14:21

## 2022-11-30 RX ADMIN — Medication 1 MILLIGRAM(S): at 11:23

## 2022-11-30 RX ADMIN — Medication 1 MILLIGRAM(S): at 06:47

## 2022-11-30 RX ADMIN — Medication 1 MILLIGRAM(S): at 21:42

## 2022-11-30 RX ADMIN — AMPICILLIN SODIUM AND SULBACTAM SODIUM 200 GRAM(S): 250; 125 INJECTION, POWDER, FOR SUSPENSION INTRAMUSCULAR; INTRAVENOUS at 06:48

## 2022-11-30 RX ADMIN — ALBUTEROL 2 PUFF(S): 90 AEROSOL, METERED ORAL at 07:17

## 2022-11-30 RX ADMIN — AMPICILLIN SODIUM AND SULBACTAM SODIUM 200 GRAM(S): 250; 125 INJECTION, POWDER, FOR SUSPENSION INTRAMUSCULAR; INTRAVENOUS at 12:51

## 2022-11-30 RX ADMIN — Medication 2 MILLIGRAM(S): at 16:08

## 2022-11-30 RX ADMIN — Medication 1 APPLICATION(S): at 11:24

## 2022-11-30 RX ADMIN — AMPICILLIN SODIUM AND SULBACTAM SODIUM 200 GRAM(S): 250; 125 INJECTION, POWDER, FOR SUSPENSION INTRAMUSCULAR; INTRAVENOUS at 00:28

## 2022-11-30 RX ADMIN — Medication 2: at 18:32

## 2022-11-30 NOTE — PROGRESS NOTE ADULT - SUBJECTIVE AND OBJECTIVE BOX
Chief Complaint: Respiratory distress    Interval Events: No events overnight.    Review of Systems:  Unable to obtain    Physical Exam:  Vital Signs Last 24 Hrs  T(C): 37.2 (30 Nov 2022 05:00), Max: 37.7 (29 Nov 2022 15:51)  T(F): 99 (30 Nov 2022 05:00), Max: 99.9 (29 Nov 2022 15:51)  HR: 64 (30 Nov 2022 08:00) (63 - 108)  BP: 99/51 (30 Nov 2022 08:00) (99/51 - 138/64)  BP(mean): 67 (30 Nov 2022 08:00) (67 - 89)  RR: 21 (30 Nov 2022 08:00) (11 - 39)  SpO2: 94% (30 Nov 2022 08:00) (81% - 100%)  Parameters below as of 30 Nov 2022 07:45  Patient On (Oxygen Delivery Method): ventilator,80  General: Unresponsive  HEENT: MMM  Neck: No JVD, no carotid bruit  Lungs: CTAB  CV: RRR, nl S1/S2, no M/R/G  Abdomen: S/NT/ND, +BS  Extremities: 1+ LE edema, no cyanosis  Neuro: AAOx0  Skin: No rash    Labs:    11-30    139  |  101  |  25<H>  ----------------------------<  157<H>  4.4   |  30  |  1.14    Ca    8.4      30 Nov 2022 05:55  Phos  3.3     11-29  Mg     2.4     11-29    TPro  6.8  /  Alb  1.5<L>  /  TBili  0.6  /  DBili  x   /  AST  14  /  ALT  14  /  AlkPhos  111  11-29                        9.2    12.90 )-----------( 175      ( 30 Nov 2022 05:55 )             30.8       ECG/Telemetry: Sinus rhythm

## 2022-11-30 NOTE — PROGRESS NOTE ADULT - ASSESSMENT
77F with dementia, COPD with chronic respiratory failure s/p trach, cardiac arrest, CHH, quadriplegia, CVA, CKD, anemia, PEG tube, and multiple hospital admissions for respiratory distress presents to the ED BIBEMS from [facility] for diaphoresis and fever.       abx  ativan  s/p IVF  vent support    GOC   pt is full code   is full code  assist with needs -   oral hygiene  skin care  recurrent admissions  long term resident of SNF  vent dep  anoxic brain injury - dementia - vegetative state

## 2022-11-30 NOTE — PROGRESS NOTE ADULT - ASSESSMENT
REVIEW OF SYMPTOMS      Able to give (reliable) ROS  NO     PHYSICAL EXAM    HEENT Unremarkable  atraumatic   RESP Fair air entry EXP prolonged    Harsh breath sound Resp distres mild   CARDIAC S1 S2 No S3     NO JVD    ABDOMEN SOFT BS PRESENT NOT DISTENDED No hepatosplenomegaly   PEDAL EDEMA present No calf tenderness  NO rash       GENERAL DATA .   GOC.  full code      ALLGY.     codeine                        WT. 11/21/2022 57  BMI.    11/21/2022 21                          ICU STAY.   admitted ICU 11/21/2022  COVID.  Scv2 11/21/2022 scv2 (-)    PROCEDURE.  11/21/2022 [facility] central line    BEST PRACTICE ISSUES.    HOB ELEVATN. Yes  DVT PPLX.  11/21/2022 hpsc    SQUIRES PPLX.    11/21/2022 protonix 40   INFN PPLX.    11/21/2022 chlorhexidine .12% bid (mrsa)   11/21/2022 chlorhexidine 2% (c li)   SP SW EM.         DIET.  11/21/2022 glucerna 1.5 1000 gt               VS/ PO/IO/ VENT/ DRIPS.  11/30/2022 afeb 60 100/50   11/30/2022 ac 18/350/8/100     CURRENT ADMISSION  Pt sent back from [facility] 11/21/2022 with fever abn labs Found yto be in shock Norepi started 11/21/2022   Pulm crit care consulted      RECENT ADMISSIONS.  11/12-11/16/2022 11/4-11/10/2022  11/5 stenotrophomonas  uc 11/4 vr enterococc 50-99 candida   11/4/2022 levaquin 750 dced 11/7 doxy  11/8 bactrim 250.3  10/18-11/2/2022 11/21 ADMISSION PROBLEMS.  Vent dependent   .. Trach poa 11/21/2022  .. ac 18/350/8/100   RO VTE   .. 11/26 v duplx (-)   RIJ 11/21/2022   INFECTN   .. Decub ulcers   .. VAP 11/21/2022  ...... trach 11/21 mod acinetobacter carbapenem resist   ...... Bactrim tid  11/21-11/25/2022  ...... 11/25/2022 Unasyn 3.4 x 7d Dr Chairez    COPD  Shock   .. Norepi 11/21/2022-> 11/21-11/27  .. midodrine 11/21/2022   peg poa 11/21/2022  ANEMIA   .. Hb 11/25/2022 Hb 7.5  Hyponatremia  .. Na 11/24-11/26/2022 Na 130 - 135  Hyperkalemia .  .. k 11/26/2022 k 5.7       ASSESSMENT/RECOMMENDATIONS .   RESP.  .. Gas exchange.  11/29/2022 ac 18/350/8/80 747/45/64   11/21/2022 4a On 100% vent 759/36/273   Monitor & target po 90-95%  .. VENT MANAGEMENT.   HOB elevation  Target Pplat 30 (-)  Target PO 90-95%  Target pH 730 (+)  Daily spontaneous breathing trials   Daily sedation vacation   .. Pleural effsn .   CXR 11/21/2022 r gr than l effsn  Effusion has been tapped during prev admissions and is lp exudate If fever or worsening sepsis will plan thorac  RO VTE.  11/26 v duplx (-)   11/26/2022 check cta ch   11/29/2022 attempts made to trasport pat several times last few d but pt becomes unstable when tried to move to ct per staff   .. Bronchospasm.  11/21/2022 albut hfa 2p bid   INFECTION.  .. SCV2 status.  Scv2 11/21/2022 scv2 (-)  .. VAP   w 11/21-11/23-11/24-11/25-11/26-11/27-11/28-11/29-11/30/2022   w 16 - 8.8 - 12- 9 - 9.4- 9.1 - 11  - 9.1 - 12.9   Pr 11/21-11/22/2022 pr 1.8 - 1.1  ua 11/21/2022 w 3-5   cxr 11/21 mod to large r effsn somewhat incr from 11/12 central infiltrates possibly congestive rij   rvp 11/21/2022 (-)   rsv 11/21/2022 (-)   uc 11/21 (-)   legn 11/21 (-)   trach 11/21 mod acinetobacter carbapenem resist   mrsa 11/21 (-)   bc 11/21 (-)   11/21/2022 bactrim 285 mg trimeth q 8 h Dr BRITTANY Brantley DCED 11/25/2022 11/25/2022 Unasyn 3.4 x 7d Dr Chairez    CARDIAC.  .. Shock.    11/21/2022 midodrine 10.3 -> 11/22 midodr 15.3   11/21/2022 5a norepi 8 in 250   target map 65 (+)   .. ekg changes.  ekg 11/21/2022 s tach shoirt pr qtc 470 possible rvh   tr 11/22/2022 tr 28   .. CHF  bnp 11/30/2022 [id number]  11/30/2022 checkj echo   GI.  .. Nutrition.   11/21/2022 glucerna 1.5 1000 gt    HEMAT.  .. DVT pplx.   11/21/2022 hpsc    .. anemia.  Hb 11/21-11/22-11/23-11/24-11/25-11/26-11/28-11/29-11/30/2022   Hb 9.6- 7.8 - 7.7 - 7.7- 7.5 - 7.5  - 8.7 - 8.1 - 9.2   mcv 11/21/2022 mcv 90   inr 11/21/2022 inr 122   monitor target Hb 7 (+)   RENAL.  .. Hyperkalemia .  k 11/26-11/27-11/29/2022 k 5.7 - 6 - 4.2    11/26/2022 lokelma dose   11/27/2022 lokelma 10.3x2d   ENDOCRINE.   .. DM   11/21/2022 glarg 8 q am   11/21/2022 riss   NEURO.  .. seizures.  11/21/2022 lorazepam 1.6     TIME SPENT   Over 39 minutes aggregate critical care time spent on encounter; activities included   direct patient care, counseling and/or coordinating care reviewing notes, lab data/ imaging , discussion with multidisciplinary team/ patient  /family and explaining in detail risks, benefits, alternatives  of the recommendations     CHAPINCITO GUIDRY 76 f NW S 11/21/2022   DR JOSEPH DU

## 2022-11-30 NOTE — PROGRESS NOTE ADULT - ASSESSMENT
77F with dementia, COPD with chronic respiratory failure s/p trach, cardiac arrest, CHH, quadriplegia, CVA, CKD, anemia, PEG tube, and multiple hospital admissions for respiratory distress presents to the ED [facility]LEN[facility] from [facility] for diaphoresis and fever.         ANemia  multifacotrial/ AICD   s/p 1 unit prbc since admission   HH improved   continue supportive care    Sepsis 2/2 VAP - meets sepsis based on fever + tachycardia + source of infection.  Lactate 1.9  peristent hypotension  on pressors   Cont Unasyn x 7 days until 12/1  - cont with vent settings to maintain SaO2 >92%  - cont with midodrine 15mg TID, pressors PRN  - on ativan for agitation  - MRSA neg, trach culture +acinetobacter   wean as tolerating   Trend temps and cbc    Hyperkalemia  resolved  s/p lokelma     Hyponatremia  - Na of 130 today, likely hypovolemic  - urine lytes ordered  - continue to trend    Leukocytosis  - WBC WNL  - no fevers  - was on bactrim, changed to unasyn  - continue to trend     DM2 on insulin  - cont with lantus and insulin coverage scale with FS q6h while on TF    COPD   - cont with neb treatments    Anemia of chronic disease   - GI eval - conservative management   - cont with ferrous sulfate  - trend CBC    Prognosis grave, patient remains critically ill and is at high risk for abrupt decompensation and death

## 2022-11-30 NOTE — PROGRESS NOTE ADULT - SUBJECTIVE AND OBJECTIVE BOX
INTERVAL HPI/OVERNIGHT EVENTS:  No new overnight event.  No N/V/D.  Tolerating diet.  some leakage around the peg  Allergies    codeine (Hives)    Intolerance    General:  No wt loss, fevers, chills, night sweats, fatigue,   Eyes:  Good vision, no reported pain  ENT:  No sore throat, pain, runny nose, dysphagia  CV:  No pain, palpitations, hypo/hypertension  Resp:  No dyspnea, cough, tachypnea, wheezing  GI:  No pain, No nausea, No vomiting, No diarrhea, No constipation, No weight loss, No fever, No pruritis, No rectal bleeding, No tarry stools, No dysphagia,  :  No pain, bleeding, incontinence, nocturia  Muscle:  No pain, weakness  Neuro:  No weakness, tingling, memory problems  Psych:  No fatigue, insomnia, mood problems, depression  Endocrine:  No polyuria, polydipsia, cold/heat intolerance  Heme:  No petechiae, ecchymosis, easy bruisability  Skin:  No rash, tattoos, scars, edema      PHYSICAL EXAM:   Vital Signs:  Vital Signs Last 24 Hrs  T(C): 37.2 (2022 05:00), Max: 37.7 (2022 15:51)  T(F): 99 (2022 05:00), Max: 99.9 (2022 15:51)  HR: 64 (2022 08:00) (63 - 108)  BP: 99/51 (2022 08:00) (99/51 - 138/64)  BP(mean): 67 (2022 08:00) (67 - 89)  RR: 21 (2022 08:00) (11 - 39)  SpO2: 94% (2022 08:00) (81% - 100%)    Parameters below as of 2022 07:45  Patient On (Oxygen Delivery Method): ventilator,80      Daily     Daily Weight in k.8 (2022 05:00)I&O's Summary    2022 07:01  -  2022 07:00  --------------------------------------------------------  IN: 1825 mL / OUT: 400 mL / NET: 1425 mL        GENERAL:  Appears stated age, well-groomed, well-nourished, no distress  HEENT:  NC/AT,  conjunctivae clear and pink, no thyromegaly, nodules, adenopathy, no JVD, sclera -anicteric  CHEST:  Full & symmetric excursion, no increased effort, breath sounds clear  HEART:  Regular rhythm, S1, S2, no murmur/rub/S3/S4, no abdominal bruit, no edema  ABDOMEN:  Soft, non-tender, non-distended, normoactive bowel sounds,  no masses ,no hepato-splenomegaly, no signs of chronic liver disease  EXTEREMITIES:  no cyanosis,clubbing or edema  SKIN:  No rash/erythema/ecchymoses/petechiae/wounds/abscess/warm/dry  NEURO:  Alert, oriented, no asterixis, no tremor, no encephalopathy      LABS:                        9.2    12.90 )-----------( 175      ( 2022 05:55 )             30.8     11    139  |  101  |  25<H>  ----------------------------<  157<H>  4.4   |  30  |  1.14    Ca    8.4      2022 05:55  Phos  3.3       Mg     2.4         TPro  6.8  /  Alb  1.5<L>  /  TBili  0.6  /  DBili  x   /  AST  14  /  ALT  14  /  AlkPhos  111          amylase   lipase  RADIOLOGY & ADDITIONAL TESTS:

## 2022-11-30 NOTE — PROGRESS NOTE ADULT - SUBJECTIVE AND OBJECTIVE BOX
Date/Time Patient Seen:  		  Referring MD:   Data Reviewed	       Patient is a 77y old  Female who presents with a chief complaint of diaphoresis and fever (30 Nov 2022 05:49)      Subjective/HPI     PAST MEDICAL & SURGICAL HISTORY:  Dementia of frontal lobe type    Aphasic stroke    Diabetes mellitus    Respiratory failure    Hypertension    GERD (gastroesophageal reflux disease)    Constipation    Respiratory failure    CVA (cerebral vascular accident)    HTN (hypertension)    DM (diabetes mellitus)    Advanced dementia    COVID-19 virus detected    Quadriplegia    Pneumonia    Type II diabetes mellitus    Hx of appendectomy    Gastrostomy in place    Tracheostomy in place    Tracheostomy tube present    Feeding by G-tube          Medication list         MEDICATIONS  (STANDING):  albuterol    90 MICROgram(s) HFA Inhaler 2 Puff(s) Inhalation two times a day  ampicillin/sulbactam  IVPB 3 Gram(s) IV Intermittent every 6 hours  chlorhexidine 2% Cloths 1 Application(s) Topical daily  dextrose 5%. 1000 milliLiter(s) (50 mL/Hr) IV Continuous <Continuous>  dextrose 5%. 1000 milliLiter(s) (100 mL/Hr) IV Continuous <Continuous>  dextrose 50% Injectable 25 Gram(s) IV Push once  dextrose 50% Injectable 12.5 Gram(s) IV Push once  dextrose 50% Injectable 25 Gram(s) IV Push once  glucagon  Injectable 1 milliGRAM(s) IntraMuscular once  heparin   Injectable 5000 Unit(s) SubCutaneous every 12 hours  insulin glargine Injectable (LANTUS) 8 Unit(s) SubCutaneous every morning  insulin lispro (ADMELOG) corrective regimen sliding scale   SubCutaneous every 6 hours  LORazepam     Tablet 1 milliGRAM(s) Oral every 4 hours  midodrine 15 milliGRAM(s) Oral every 8 hours  norepinephrine Infusion 0.05 MICROgram(s)/kG/Min (5.35 mL/Hr) IV Continuous <Continuous>  pantoprazole  Injectable 40 milliGRAM(s) IV Push daily  polyethylene glycol 3350 17 Gram(s) Oral every 12 hours  povidone iodine 10% Solution 1 Application(s) Topical daily  senna 2 Tablet(s) Oral at bedtime  sodium chloride 0.9%. 1000 milliLiter(s) (100 mL/Hr) IV Continuous <Continuous>    MEDICATIONS  (PRN):  dextrose Oral Gel 15 Gram(s) Oral once PRN Blood Glucose LESS THAN 70 milliGRAM(s)/deciliter  LORazepam   Injectable 2 milliGRAM(s) IV Push every 6 hours PRN Agitation  sodium chloride 0.9% lock flush 10 milliLiter(s) IV Push every 1 hour PRN Pre/post blood products, medications, blood draw, and to maintain line patency         Vitals log        ICU Vital Signs Last 24 Hrs  T(C): 37.2 (30 Nov 2022 05:00), Max: 37.7 (29 Nov 2022 15:51)  T(F): 99 (30 Nov 2022 05:00), Max: 99.9 (29 Nov 2022 15:51)  HR: 108 (30 Nov 2022 05:45) (63 - 108)  BP: 123/56 (30 Nov 2022 05:00) (109/53 - 138/64)  BP(mean): 77 (30 Nov 2022 05:00) (69 - 83)  ABP: --  ABP(mean): --  RR: 22 (30 Nov 2022 05:00) (11 - 39)  SpO2: 96% (30 Nov 2022 05:45) (81% - 100%)    O2 Parameters below as of 30 Nov 2022 05:00  Patient On (Oxygen Delivery Method): ventilator    O2 Concentration (%): 100         Mode: AC/ CMV (Assist Control/ Continuous Mandatory Ventilation)  RR (machine): 12  TV (machine): 350  FiO2: 100  PEEP: 8  ITime: 0.1  MAP: 17  PIP: 43      Input and Output:  I&O's Detail    28 Nov 2022 07:01  -  29 Nov 2022 07:00  --------------------------------------------------------  IN:    Enteral Tube Flush: 575 mL    Glucerna 1.5: 1200 mL    IV PiggyBack: 400 mL  Total IN: 2175 mL    OUT:  Total OUT: 0 mL    Total NET: 2175 mL      29 Nov 2022 07:01  -  30 Nov 2022 06:33  --------------------------------------------------------  IN:    Enteral Tube Flush: 575 mL    Glucerna 1.5: 1150 mL    IV PiggyBack: 100 mL  Total IN: 1825 mL    OUT:    Incontinent per Collection Bag (mL): 400 mL  Total OUT: 400 mL    Total NET: 1425 mL          Lab Data                        8.1    9.14  )-----------( 152      ( 29 Nov 2022 06:51 )             26.1     11-29    139  |  104  |  26<H>  ----------------------------<  118<H>  4.2   |  30  |  1.18    Ca    8.0<L>      29 Nov 2022 06:51  Phos  3.3     11-29  Mg     2.4     11-29    TPro  6.8  /  Alb  1.5<L>  /  TBili  0.6  /  DBili  x   /  AST  14  /  ALT  14  /  AlkPhos  111  11-29    ABG - ( 29 Nov 2022 06:36 )  pH, Arterial: 7.47  pH, Blood: x     /  pCO2: 45    /  pO2: 64    / HCO3: 33    / Base Excess: 9.1   /  SaO2: 94.6                    Review of Systems	      Objective     Physical Examination    heart s1s2  lung dec BS  head nc      Pertinent Lab findings & Imaging      Annalise:  NO   Adequate UO     I&O's Detail    28 Nov 2022 07:01  -  29 Nov 2022 07:00  --------------------------------------------------------  IN:    Enteral Tube Flush: 575 mL    Glucerna 1.5: 1200 mL    IV PiggyBack: 400 mL  Total IN: 2175 mL    OUT:  Total OUT: 0 mL    Total NET: 2175 mL      29 Nov 2022 07:01  -  30 Nov 2022 06:33  --------------------------------------------------------  IN:    Enteral Tube Flush: 575 mL    Glucerna 1.5: 1150 mL    IV PiggyBack: 100 mL  Total IN: 1825 mL    OUT:    Incontinent per Collection Bag (mL): 400 mL  Total OUT: 400 mL    Total NET: 1425 mL               Discussed with:     Cultures:	        Radiology

## 2022-11-30 NOTE — PROGRESS NOTE ADULT - ASSESSMENT
The patient is a 77 year old female with a history of HTN, DM, CVA, dementia, chronic respiratory failure s/p trach, PEG, cardiac arrest, anemia, pleural effusions who presents with fever and respiratory distress.    Plan:  - Echo 8/22 with normal LV systolic function, mod pulm HTN, IVC small/collapsible  - CXR with diffuse lung infiltrates  - Continue midodrine 10 mg tid  - Low oncotic pressure leading to third spacing - diuretics likely to have minimal long term effect but can attempt if needed with worsening hypoxia  - Repeat echo pending  - Pulm and ID follow-up  - Comfort care would be most appropriate

## 2022-11-30 NOTE — PROGRESS NOTE ADULT - SUBJECTIVE AND OBJECTIVE BOX
Patient is a 77y old  Female who presents with a chief complaint of diaphoresis and fever (30 Nov 2022 11:56)      INTERVAL HPI/OVERNIGHT EVENTS:    no overnight events      MEDICATIONS  (STANDING):  albuterol    90 MICROgram(s) HFA Inhaler 2 Puff(s) Inhalation two times a day  ampicillin/sulbactam  IVPB 3 Gram(s) IV Intermittent every 6 hours  chlorhexidine 2% Cloths 1 Application(s) Topical daily  dextrose 5%. 1000 milliLiter(s) (100 mL/Hr) IV Continuous <Continuous>  dextrose 5%. 1000 milliLiter(s) (50 mL/Hr) IV Continuous <Continuous>  dextrose 50% Injectable 25 Gram(s) IV Push once  dextrose 50% Injectable 12.5 Gram(s) IV Push once  dextrose 50% Injectable 25 Gram(s) IV Push once  glucagon  Injectable 1 milliGRAM(s) IntraMuscular once  heparin   Injectable 5000 Unit(s) SubCutaneous every 12 hours  insulin glargine Injectable (LANTUS) 8 Unit(s) SubCutaneous every morning  insulin lispro (ADMELOG) corrective regimen sliding scale   SubCutaneous every 6 hours  LORazepam     Tablet 1 milliGRAM(s) Oral every 4 hours  midodrine 15 milliGRAM(s) Oral every 8 hours  norepinephrine Infusion 0.05 MICROgram(s)/kG/Min (5.35 mL/Hr) IV Continuous <Continuous>  pantoprazole  Injectable 40 milliGRAM(s) IV Push daily  polyethylene glycol 3350 17 Gram(s) Oral every 12 hours  povidone iodine 10% Solution 1 Application(s) Topical daily  senna 2 Tablet(s) Oral at bedtime  sodium chloride 0.9%. 1000 milliLiter(s) (100 mL/Hr) IV Continuous <Continuous>    MEDICATIONS  (PRN):  dextrose Oral Gel 15 Gram(s) Oral once PRN Blood Glucose LESS THAN 70 milliGRAM(s)/deciliter  LORazepam   Injectable 2 milliGRAM(s) IV Push every 6 hours PRN Agitation  sodium chloride 0.9% lock flush 10 milliLiter(s) IV Push every 1 hour PRN Pre/post blood products, medications, blood draw, and to maintain line patency      Allergies    codeine (Hives)    Intolerances        REVIEW OF SYSTEMS:  nonverbal    Vital Signs Last 24 Hrs  T(C): 37.2 (30 Nov 2022 05:00), Max: 37.7 (29 Nov 2022 15:51)  T(F): 99 (30 Nov 2022 05:00), Max: 99.9 (29 Nov 2022 15:51)  HR: 64 (30 Nov 2022 08:00) (63 - 108)  BP: 99/51 (30 Nov 2022 08:00) (99/51 - 138/64)  BP(mean): 67 (30 Nov 2022 08:00) (67 - 89)  RR: 21 (30 Nov 2022 08:00) (11 - 39)  SpO2: 94% (30 Nov 2022 08:00) (81% - 100%)    Parameters below as of 30 Nov 2022 07:45  Patient On (Oxygen Delivery Method): ventilator,80        PHYSICAL EXAM:  GENERAL: chronically ill appearing, emaciated, NAD, obtunded  HEAD: atraumatic  EYES: conjunctiva clear  NECK: (+)trach in place, clean  RESPIRATORY: mechanical breath sounds, diminished, vent in place, no gross crackles or rales  CARDIOVASCULAR:  regular rate and rhythm, no murmurs or rubs or gallops, 2+ peripheral pulses  GASTROINTESTINAL:  soft, +PEG in place, nondistended, bowel sounds present  EXTREMITIES: no clubbing or cyanosis or edema  MUSCULOSKELETAL: (+)diffuse contractures in all joints  NERVOUS SYSTEM: does not respond to pain    LABS:                        9.2    12.90 )-----------( 175      ( 30 Nov 2022 05:55 )             30.8     30 Nov 2022 05:55    139    |  101    |  25     ----------------------------<  157    4.4     |  30     |  1.14     Ca    8.4        30 Nov 2022 05:55          CAPILLARY BLOOD GLUCOSE      POCT Blood Glucose.: 183 mg/dL (30 Nov 2022 12:37)  POCT Blood Glucose.: 154 mg/dL (30 Nov 2022 07:55)  POCT Blood Glucose.: 142 mg/dL (30 Nov 2022 06:42)  POCT Blood Glucose.: 149 mg/dL (30 Nov 2022 00:26)  POCT Blood Glucose.: 185 mg/dL (29 Nov 2022 17:31)      RADIOLOGY & ADDITIONAL TESTS:

## 2022-11-30 NOTE — PROGRESS NOTE ADULT - SUBJECTIVE AND OBJECTIVE BOX
BREA BECKHAM     [facility] 03    Allergies    codeine (Hives)    Intolerances        PAST MEDICAL & SURGICAL HISTORY:  Dementia of frontal lobe type      Aphasic stroke      Diabetes mellitus      Respiratory failure      Hypertension      GERD (gastroesophageal reflux disease)      Constipation      Respiratory failure      CVA (cerebral vascular accident)      HTN (hypertension)      DM (diabetes mellitus)      Advanced dementia      COVID-19 virus detected      Quadriplegia      Pneumonia      Type II diabetes mellitus      Hx of appendectomy      Gastrostomy in place      Tracheostomy in place      Tracheostomy tube present      Feeding by G-tube          FAMILY HISTORY:  No pertinent family history in first degree relatives        Home Medications:  Admelog 100 units/mL injectable solution: injectable every 6 hours SS (21 Nov 2022 05:08)  albuterol 90 mcg/inh inhalation aerosol with adapter: 2  inhaled every 6 hours (21 Nov 2022 04:24)  Bacid (LAC) oral tablet: 2 tab(s) by gastrostomy tube once a day (21 Nov 2022 04:24)  bacitracin 500 units/g topical ointment: Apply topically to affected area once a day to knees (21 Nov 2022 04:24)  chlorhexidine 0.12% mucous membrane liquid: 15 milliliter(s) mucous membrane 2 times a day (21 Nov 2022 04:24)  Dakins Full Strength 0.5% topical solution: Apply topically to affected area 2 times a day then apply santyl and calcium alginate (21 Nov 2022 04:24)  Eucerin topical cream: Apply topically to affected area once a day bilateral feet (21 Nov 2022 04:24)  ferrous sulfate 325 mg (65 mg elemental iron) oral tablet: 1 tab(s) by gastrostomy tube once a day (21 Nov 2022 04:24)  insulin glargine 100 units/mL subcutaneous solution: 8 unit(s) subcutaneous once a day (in the morning) (21 Nov 2022 04:24)  ipratropium-albuterol 0.5 mg-2.5 mg/3 mL inhalation solution: 3 milliliter(s) inhaled every 6 hours (21 Nov 2022 04:24)  LORazepam 1 mg oral tablet: 1 tab(s) by gastrostomy tube every 4 hours (21 Nov 2022 04:24)  midodrine 10 mg oral tablet: 1 tab(s) by gastrostomy tube every 8 hours (21 Nov 2022 04:24)  MiraLax oral powder for reconstitution: orally once a day (at bedtime) (21 Nov 2022 05:08)  mulivitamin: by gastrostomy tube once a day (21 Nov 2022 04:24)  nystatin 100,000 units/g topical powder: 1 application topically 3 times a day (21 Nov 2022 04:24)  omeprazole 40 mg oral delayed release capsule: 1 cap(s) by gastrostomy tube 2 times a day (21 Nov 2022 04:24)  polyethylene glycol 3350 oral powder for reconstitution: 17 gram(s) by gastrostomy tube every 12 hours (21 Nov 2022 04:24)  senna 8.6 mg oral tablet: 3 tab(s) by gastrostomy tube once a day (at bedtime) (21 Nov 2022 04:24)  simethicone 80 mg oral tablet, chewable: 1 tab(s) by gastrostomy tube every 6 hours (21 Nov 2022 04:24)  sucralfate 1 g/10 mL oral suspension: 10 milliliter(s) g-tube 4 times a day (before meals and at bedtime) (21 Nov 2022 04:24)  Tylenol 325 mg oral tablet: 2 tab(s) orally every 6 hours, As Needed prior to dressing change (21 Nov 2022 04:24)      MEDICATIONS  (STANDING):  albuterol    90 MICROgram(s) HFA Inhaler 2 Puff(s) Inhalation two times a day  ampicillin/sulbactam  IVPB 3 Gram(s) IV Intermittent every 6 hours  chlorhexidine 2% Cloths 1 Application(s) Topical daily  dextrose 5%. 1000 milliLiter(s) (50 mL/Hr) IV Continuous <Continuous>  dextrose 5%. 1000 milliLiter(s) (100 mL/Hr) IV Continuous <Continuous>  dextrose 50% Injectable 25 Gram(s) IV Push once  dextrose 50% Injectable 12.5 Gram(s) IV Push once  dextrose 50% Injectable 25 Gram(s) IV Push once  glucagon  Injectable 1 milliGRAM(s) IntraMuscular once  heparin   Injectable 5000 Unit(s) SubCutaneous every 12 hours  insulin glargine Injectable (LANTUS) 8 Unit(s) SubCutaneous every morning  insulin lispro (ADMELOG) corrective regimen sliding scale   SubCutaneous every 6 hours  LORazepam     Tablet 1 milliGRAM(s) Oral every 4 hours  midodrine 15 milliGRAM(s) Oral every 8 hours  norepinephrine Infusion 0.05 MICROgram(s)/kG/Min (5.35 mL/Hr) IV Continuous <Continuous>  pantoprazole  Injectable 40 milliGRAM(s) IV Push daily  polyethylene glycol 3350 17 Gram(s) Oral every 12 hours  povidone iodine 10% Solution 1 Application(s) Topical daily  senna 2 Tablet(s) Oral at bedtime  sodium chloride 0.9%. 1000 milliLiter(s) (100 mL/Hr) IV Continuous <Continuous>    MEDICATIONS  (PRN):  dextrose Oral Gel 15 Gram(s) Oral once PRN Blood Glucose LESS THAN 70 milliGRAM(s)/deciliter  LORazepam   Injectable 2 milliGRAM(s) IV Push every 6 hours PRN Agitation  sodium chloride 0.9% lock flush 10 milliLiter(s) IV Push every 1 hour PRN Pre/post blood products, medications, blood draw, and to maintain line patency      Diet, NPO with Tube Feed:   Tube Feeding Modality: Gastrostomy  Glucerna 1.5 Horacio  Total Volume for 24 Hours (mL): 1000  Continuous  Starting Tube Feed Rate mL per Hour: 10  Increase Tube Feed Rate by (mL): 10     Every 10 hours  Until Goal Tube Feed Rate (mL per Hour): 50  Tube Feed Duration (in Hours): 20  Tube Feed Start Time: 17:00  Free Water Flush  Pump   Rate (mL per Hour): 25   Frequency: Every Hour    Duration (Hours): 24 (11-21-22 @ 05:10) [Active]          Vital Signs Last 24 Hrs  T(C): 37.4 (30 Nov 2022 01:00), Max: 37.7 (29 Nov 2022 15:51)  T(F): 99.4 (30 Nov 2022 01:00), Max: 99.9 (29 Nov 2022 15:51)  HR: 67 (30 Nov 2022 01:19) (63 - 88)  BP: 113/54 (30 Nov 2022 01:00) (109/53 - 138/64)  BP(mean): 72 (30 Nov 2022 01:00) (69 - 83)  RR: 20 (30 Nov 2022 01:00) (11 - 39)  SpO2: 96% (30 Nov 2022 01:19) (81% - 100%)    Parameters below as of 29 Nov 2022 20:00  Patient On (Oxygen Delivery Method): ventilator          11-28-22 @ 07:01  -  11-29-22 @ 07:00  --------------------------------------------------------  IN: 2175 mL / OUT: 0 mL / NET: 2175 mL    11-29-22 @ 07:01  -  11-30-22 @ 05:50  --------------------------------------------------------  IN: 1525 mL / OUT: 0 mL / NET: 1525 mL        Mode: AC/ CMV (Assist Control/ Continuous Mandatory Ventilation), RR (machine): 18, TV (machine): 350, FiO2: 100, PEEP: 8, ITime: 1, MAP: 19, PIP: 47      LABS:                        8.1    9.14  )-----------( 152      ( 29 Nov 2022 06:51 )             26.1     11-29    139  |  104  |  26<H>  ----------------------------<  118<H>  4.2   |  30  |  1.18    Ca    8.0<L>      29 Nov 2022 06:51  Phos  3.3     11-29  Mg     2.4     11-29    TPro  6.8  /  Alb  1.5<L>  /  TBili  0.6  /  DBili  x   /  AST  14  /  ALT  14  /  AlkPhos  111  11-29          ABG - ( 29 Nov 2022 06:36 )  pH, Arterial: 7.47  pH, Blood: x     /  pCO2: 45    /  pO2: 64    / HCO3: 33    / Base Excess: 9.1   /  SaO2: 94.6                WBC:  WBC Count: 9.14 K/uL (11-29 @ 06:51)  WBC Count: 11.08 K/uL (11-28 @ 07:08)  WBC Count: 9.55 K/uL (11-27 @ 20:10)  WBC Count: 9.13 K/uL (11-27 @ 06:35)  WBC Count: 9.41 K/uL (11-26 @ 06:09)      MICROBIOLOGY:  RECENT CULTURES:                  Sodium:  Sodium, Serum: 139 mmol/L (11-29 @ 06:51)  Sodium, Serum: 137 mmol/L (11-28 @ 07:08)  Sodium, Serum: 137 mmol/L (11-27 @ 20:10)  Sodium, Serum: 137 mmol/L (11-27 @ 06:35)  Sodium, Serum: 135 mmol/L (11-26 @ 06:09)      1.18 mg/dL 11-29 @ 06:51  1.20 mg/dL 11-28 @ 07:08  1.20 mg/dL 11-27 @ 20:10  1.17 mg/dL 11-27 @ 06:35  1.26 mg/dL 11-26 @ 06:09      Hemoglobin:  Hemoglobin: 8.1 g/dL (11-29 @ 06:51)  Hemoglobin: 8.7 g/dL (11-28 @ 07:08)  Hemoglobin: 6.7 g/dL (11-27 @ 20:10)  Hemoglobin: 7.2 g/dL (11-27 @ 06:35)  Hemoglobin: 7.5 g/dL (11-26 @ 06:09)      Platelets: Platelet Count - Automated: 152 K/uL (11-29 @ 06:51)  Platelet Count - Automated: 167 K/uL (11-28 @ 07:08)  Platelet Count - Automated: 160 K/uL (11-27 @ 20:10)  Platelet Count - Automated: 176 K/uL (11-27 @ 06:35)  Platelet Count - Automated: 193 K/uL (11-26 @ 06:09)      LIVER FUNCTIONS - ( 29 Nov 2022 06:51 )  Alb: 1.5 g/dL / Pro: 6.8 g/dL / ALK PHOS: 111 U/L / ALT: 14 U/L DA / AST: 14 U/L / GGT: x                 RADIOLOGY & ADDITIONAL STUDIES:      MICROBIOLOGY:  RECENT CULTURES:

## 2022-11-30 NOTE — PROGRESS NOTE ADULT - SUBJECTIVE AND OBJECTIVE BOX
[facility], Division of Infectious Diseases  MOIZ Lora Y. Patel, S. Shah, G. [facility]  [phone]    Name: BREA BECKHAM  Age: 77y  Gender: Female  MRN: [id number]    Interval History:  Patient seen and examined at bedside in [facility]  Afebrile overnight  Notes reviewed    Antibiotics:  ampicillin/sulbactam  IVPB 3 Gram(s) IV Intermittent every 6 hours      Medications:  albuterol    90 MICROgram(s) HFA Inhaler 2 Puff(s) Inhalation two times a day  ampicillin/sulbactam  IVPB 3 Gram(s) IV Intermittent every 6 hours  chlorhexidine 2% Cloths 1 Application(s) Topical daily  dexMEDEtomidine Infusion 0.5 MICROgram(s)/kG/Hr IV Continuous <Continuous>  dextrose 5%. 1000 milliLiter(s) IV Continuous <Continuous>  dextrose 5%. 1000 milliLiter(s) IV Continuous <Continuous>  dextrose 50% Injectable 25 Gram(s) IV Push once  dextrose 50% Injectable 12.5 Gram(s) IV Push once  dextrose 50% Injectable 25 Gram(s) IV Push once  dextrose Oral Gel 15 Gram(s) Oral once PRN  glucagon  Injectable 1 milliGRAM(s) IntraMuscular once  heparin   Injectable 5000 Unit(s) SubCutaneous every 12 hours  insulin glargine Injectable (LANTUS) 8 Unit(s) SubCutaneous every morning  insulin lispro (ADMELOG) corrective regimen sliding scale   SubCutaneous every 6 hours  LORazepam     Tablet 1 milliGRAM(s) Oral every 4 hours  LORazepam   Injectable 2 milliGRAM(s) IV Push every 6 hours PRN  midodrine 15 milliGRAM(s) Oral every 8 hours  norepinephrine Infusion 0.05 MICROgram(s)/kG/Min IV Continuous <Continuous>  pantoprazole  Injectable 40 milliGRAM(s) IV Push daily  polyethylene glycol 3350 17 Gram(s) Oral every 12 hours  povidone iodine 10% Solution 1 Application(s) Topical daily  senna 2 Tablet(s) Oral at bedtime  sodium chloride 0.9% lock flush 10 milliLiter(s) IV Push every 1 hour PRN  sodium chloride 0.9%. 1000 milliLiter(s) IV Continuous <Continuous>  sodium zirconium cyclosilicate 10 Gram(s) Oral three times a day      Review of Systems:  unable to obtain    Allergies: codeine (Hives)    For details regarding the patient's past medical history, social history, family history, and other miscellaneous elements, please refer the initial infectious diseases consultation and/or the admitting history and physical examination for this admission.    Objective:  Vital Signs Last 24 Hrs  T(C): 37.2 (30 Nov 2022 05:00), Max: 37.7 (29 Nov 2022 15:51)  T(F): 99 (30 Nov 2022 05:00), Max: 99.9 (29 Nov 2022 15:51)  HR: 64 (30 Nov 2022 08:00) (63 - 108)  BP: 99/51 (30 Nov 2022 08:00) (99/51 - 138/64)  BP(mean): 67 (30 Nov 2022 08:00) (67 - 89)  RR: 21 (30 Nov 2022 08:00) (11 - 39)  SpO2: 94% (30 Nov 2022 08:00) (81% - 100%)    Parameters below as of 30 Nov 2022 07:45  Patient On (Oxygen Delivery Method): ventilator,80      Physical Examination:  General: no acute distress  HEENT: NC/AT, EOMI  Neck: trach  Cardio: S1, S2 heard, RRR, no murmurs  Resp: MV breath sounds  Abd: distended  Ext: no edema or cyanosis  Skin: warm, dry, no visible rash      Laboratory Studies:                        8.1    9.14  )-----------( 152      ( 29 Nov 2022 06:51 )             26.1   11-29    139  |  104  |  26<H>  ----------------------------<  118<H>  4.2   |  30  |  1.18    Ca    8.0<L>      29 Nov 2022 06:51  Phos  3.3     11-29  Mg     2.4     11-29    TPro  6.8  /  Alb  1.5<L>  /  TBili  0.6  /  DBili  x   /  AST  14  /  ALT  14  /  AlkPhos  111  11-29        Microbiology: reviewed    Culture - Sputum (collected 11-21-22 @ 20:14)  Source: Trach Asp Tracheal Aspirate  Gram Stain (11-22-22 @ 08:43):    Moderate polymorphonuclear leukocytes per low power field    No Squamous epithelial cells per low power field    No organisms seen  Final Report (11-24-22 @ 17:55):    Moderate Acinetobacter baumannii/nosocom group (Carbapenem Resistant)    Normal Respiratory Elvira present  Organism: Acinetobacter baumannii/nosocom group (Carbapenem Resistant) (11-24-22 @ 17:55)  Organism: Acinetobacter baumannii/nosocom group (Carbapenem Resistant) (11-24-22 @ 17:55)      -  Polymyxin B: I 0.25      Method Type: ETEST  Organism: Acinetobacter baumannii/nosocom group (Carbapenem Resistant) (11-24-22 @ 17:55)      -  Minocycline: R      -  Piperacillin/Tazobactam: R      Method Type: KB  Organism: Acinetobacter baumannii/nosocom group (Carbapenem Resistant) (11-24-22 @ 17:55)      -  Amikacin: S <=16      -  Ampicillin/Sulbactam: S 8/4      -  Cefepime: R >16      -  Ceftazidime: R >16      -  Ciprofloxacin: R >2      -  Gentamicin: I 8      -  Imipenem: R 8      -  Levofloxacin: R >4      -  Meropenem: R >8      -  Tobramycin: R >8      -  Trimethoprim/Sulfamethoxazole: R >2/38      Method Type: PRATIK    Culture - Urine (collected 11-21-22 @ 04:25)  Source: Clean Catch Clean Catch (Midstream)  Final Report (11-22-22 @ 09:39):    <10,000 CFU/mL Normal Urogenital Elvira    Culture - Blood (collected 11-21-22 @ 03:56)  Source: .Blood Blood-Peripheral  Final Report (11-26-22 @ 14:01):    No Growth Final    Culture - Blood (collected 11-21-22 @ 03:56)  Source: .Blood Blood-Peripheral  Final Report (11-26-22 @ 14:00):    No Growth Final          Radiology: reviewed       [facility], Division of Infectious Diseases  MOIZ Lora Y. Patel, S. Shah, G. [facility]  [phone]    Name: BREA BECKHAM  Age: 77y  Gender: Female  MRN: [id number]    Interval History:  Patient seen and examined at bedside in [facility]  Afebrile overnight  Notes reviewed    Antibiotics:  ampicillin/sulbactam  IVPB 3 Gram(s) IV Intermittent every 6 hours      Medications:  albuterol    90 MICROgram(s) HFA Inhaler 2 Puff(s) Inhalation two times a day  ampicillin/sulbactam  IVPB 3 Gram(s) IV Intermittent every 6 hours  chlorhexidine 2% Cloths 1 Application(s) Topical daily  dexMEDEtomidine Infusion 0.5 MICROgram(s)/kG/Hr IV Continuous <Continuous>  dextrose 5%. 1000 milliLiter(s) IV Continuous <Continuous>  dextrose 5%. 1000 milliLiter(s) IV Continuous <Continuous>  dextrose 50% Injectable 25 Gram(s) IV Push once  dextrose 50% Injectable 12.5 Gram(s) IV Push once  dextrose 50% Injectable 25 Gram(s) IV Push once  dextrose Oral Gel 15 Gram(s) Oral once PRN  glucagon  Injectable 1 milliGRAM(s) IntraMuscular once  heparin   Injectable 5000 Unit(s) SubCutaneous every 12 hours  insulin glargine Injectable (LANTUS) 8 Unit(s) SubCutaneous every morning  insulin lispro (ADMELOG) corrective regimen sliding scale   SubCutaneous every 6 hours  LORazepam     Tablet 1 milliGRAM(s) Oral every 4 hours  LORazepam   Injectable 2 milliGRAM(s) IV Push every 6 hours PRN  midodrine 15 milliGRAM(s) Oral every 8 hours  norepinephrine Infusion 0.05 MICROgram(s)/kG/Min IV Continuous <Continuous>  pantoprazole  Injectable 40 milliGRAM(s) IV Push daily  polyethylene glycol 3350 17 Gram(s) Oral every 12 hours  povidone iodine 10% Solution 1 Application(s) Topical daily  senna 2 Tablet(s) Oral at bedtime  sodium chloride 0.9% lock flush 10 milliLiter(s) IV Push every 1 hour PRN  sodium chloride 0.9%. 1000 milliLiter(s) IV Continuous <Continuous>  sodium zirconium cyclosilicate 10 Gram(s) Oral three times a day      Review of Systems:  unable to obtain    Allergies: codeine (Hives)    For details regarding the patient's past medical history, social history, family history, and other miscellaneous elements, please refer the initial infectious diseases consultation and/or the admitting history and physical examination for this admission.    Objective:  Vital Signs Last 24 Hrs  T(C): 37.2 (30 Nov 2022 05:00), Max: 37.7 (29 Nov 2022 15:51)  T(F): 99 (30 Nov 2022 05:00), Max: 99.9 (29 Nov 2022 15:51)  HR: 64 (30 Nov 2022 08:00) (63 - 108)  BP: 99/51 (30 Nov 2022 08:00) (99/51 - 138/64)  BP(mean): 67 (30 Nov 2022 08:00) (67 - 89)  RR: 21 (30 Nov 2022 08:00) (11 - 39)  SpO2: 94% (30 Nov 2022 08:00) (81% - 100%)    Parameters below as of 30 Nov 2022 07:45  Patient On (Oxygen Delivery Method): ventilator,80      Physical Examination:  General: no acute distress  HEENT: NC/AT, EOMI  Neck: trach  Cardio: S1, S2 heard, RRR, no murmurs  Resp: MV breath sounds  Abd: distended  Ext: no edema or cyanosis  Skin: warm, dry, no visible rash      Laboratory Studies:                        9.2    12.90 )-----------( 175      ( 30 Nov 2022 05:55 )             30.8   11-30    139  |  101  |  25<H>  ----------------------------<  157<H>  4.4   |  30  |  1.14    Ca    8.4      30 Nov 2022 05:55  Phos  3.3     11-29  Mg     2.4     11-29    TPro  6.8  /  Alb  1.5<L>  /  TBili  0.6  /  DBili  x   /  AST  14  /  ALT  14  /  AlkPhos  111  11-29        Microbiology: reviewed    Culture - Sputum (collected 11-21-22 @ 20:14)  Source: Trach Asp Tracheal Aspirate  Gram Stain (11-22-22 @ 08:43):    Moderate polymorphonuclear leukocytes per low power field    No Squamous epithelial cells per low power field    No organisms seen  Final Report (11-24-22 @ 17:55):    Moderate Acinetobacter baumannii/nosocom group (Carbapenem Resistant)    Normal Respiratory Elvira present  Organism: Acinetobacter baumannii/nosocom group (Carbapenem Resistant) (11-24-22 @ 17:55)  Organism: Acinetobacter baumannii/nosocom group (Carbapenem Resistant) (11-24-22 @ 17:55)      -  Polymyxin B: I 0.25      Method Type: ETEST  Organism: Acinetobacter baumannii/nosocom group (Carbapenem Resistant) (11-24-22 @ 17:55)      -  Minocycline: R      -  Piperacillin/Tazobactam: R      Method Type: KB  Organism: Acinetobacter baumannii/nosocom group (Carbapenem Resistant) (11-24-22 @ 17:55)      -  Amikacin: S <=16      -  Ampicillin/Sulbactam: S 8/4      -  Cefepime: R >16      -  Ceftazidime: R >16      -  Ciprofloxacin: R >2      -  Gentamicin: I 8      -  Imipenem: R 8      -  Levofloxacin: R >4      -  Meropenem: R >8      -  Tobramycin: R >8      -  Trimethoprim/Sulfamethoxazole: R >2/38      Method Type: PRATIK    Culture - Urine (collected 11-21-22 @ 04:25)  Source: Clean Catch Clean Catch (Midstream)  Final Report (11-22-22 @ 09:39):    <10,000 CFU/mL Normal Urogenital Elvira    Culture - Blood (collected 11-21-22 @ 03:56)  Source: .Blood Blood-Peripheral  Final Report (11-26-22 @ 14:01):    No Growth Final    Culture - Blood (collected 11-21-22 @ 03:56)  Source: .Blood Blood-Peripheral  Final Report (11-26-22 @ 14:00):    No Growth Final          Radiology: reviewed

## 2022-11-30 NOTE — PROGRESS NOTE ADULT - ASSESSMENT
77F with dementia, COPD with chronic respiratory failure s/p trach, cardiac arrest, CHH, quadriplegia, CVA, CKD, anemia, PEG tube, and multiple hospital admissions for respiratory distress presents to the ED BIBEMS from [facility] ER for diaphoresis and fever.     Readmitted with sepsis with shock  2/2 recurent VAP.    Sputum cx 11/21 with Acinetobacter baumannii/nosocom group (Carbapenem Resistant) resistant to Bactrim    Plan:  Cont Unasyn x 7 days until 12/1, last full day tomorrow  Trend temps and cbc  Asp precautions  Aggressive pulm toileting  Isolation per infection control policy  Vent management per ICU  on midodrine, pressors prn    Poor prognosis  Continued [facility]    Infectious Diseases will continue to follow. Please call with any questions.   Andressa Brantley M.D.  \[facility]\"" Division of Infectious Diseases [phone]

## 2022-11-30 NOTE — PROGRESS NOTE ADULT - SUBJECTIVE AND OBJECTIVE BOX
Patient is a 77y old  Female who presents with a chief complaint of diaphoresis and fever (30 Nov 2022 12:43)    PAST MEDICAL & SURGICAL HISTORY:  Dementia of frontal lobe type      Aphasic stroke      Diabetes mellitus      Respiratory failure      Hypertension      GERD (gastroesophageal reflux disease)      Constipation      Respiratory failure      CVA (cerebral vascular accident)      HTN (hypertension)      DM (diabetes mellitus)      Advanced dementia      COVID-19 virus detected      Quadriplegia      Pneumonia      Type II diabetes mellitus      Hx of appendectomy      Gastrostomy in place      Tracheostomy in place      Tracheostomy tube present      Feeding by G-tube        BREA BECKHAM   77y    Female    BRIEF HOSPITAL COURSE:    Review of Systems:                       All other ROS are negative.    Allergies    codeine (Hives)    Intolerances          ICU Vital Signs Last 24 Hrs  T(C): 36.1 (30 Nov 2022 19:29), Max: 37.4 (30 Nov 2022 01:00)  T(F): 97 (30 Nov 2022 19:29), Max: 99.4 (30 Nov 2022 01:00)  HR: 62 (30 Nov 2022 20:37) (59 - 108)  BP: 107/57 (30 Nov 2022 18:00) (89/55 - 141/56)  BP(mean): 73 (30 Nov 2022 18:00) (66 - 95)  ABP: --  ABP(mean): --  RR: 22 (30 Nov 2022 18:00) (0 - 29)  SpO2: 99% (30 Nov 2022 20:37) (84% - 100%)    O2 Parameters below as of 30 Nov 2022 07:45  Patient On (Oxygen Delivery Method): ventilator,80          Physical Examination:    General:     HEENT:     PULM:     CVS:     ABD:     EXT:     SKIN:     Neuro:    ABG - ( 29 Nov 2022 06:36 )  pH, Arterial: 7.47  pH, Blood: x     /  pCO2: 45    /  pO2: 64    / HCO3: 33    / Base Excess: 9.1   /  SaO2: 94.6              Mode: AC/ CMV (Assist Control/ Continuous Mandatory Ventilation)  RR (machine): 18  TV (machine): 350  FiO2: 100  PEEP: 8  ITime: 1  MAP: 17  PIP: 44    Mode: AC/ CMV (Assist Control/ Continuous Mandatory Ventilation), RR (machine): 18, TV (machine): 350, FiO2: 100, PEEP: 8, ITime: 1, MAP: 17, PIP: 44  LABS:                        9.2    12.90 )-----------( 175      ( 30 Nov 2022 05:55 )             30.8     11-30    139  |  101  |  25<H>  ----------------------------<  157<H>  4.4   |  30  |  1.14    Ca    8.4      30 Nov 2022 05:55  Phos  3.3     11-29  Mg     2.4     11-29    TPro  6.8  /  Alb  1.5<L>  /  TBili  0.6  /  DBili  x   /  AST  14  /  ALT  14  /  AlkPhos  111  11-29          CAPILLARY BLOOD GLUCOSE      POCT Blood Glucose.: 157 mg/dL (30 Nov 2022 18:30)  POCT Blood Glucose.: 183 mg/dL (30 Nov 2022 12:37)  POCT Blood Glucose.: 154 mg/dL (30 Nov 2022 07:55)  POCT Blood Glucose.: 142 mg/dL (30 Nov 2022 06:42)  POCT Blood Glucose.: 149 mg/dL (30 Nov 2022 00:26)        CULTURES:      Medications:  MEDICATIONS  (STANDING):  albuterol    90 MICROgram(s) HFA Inhaler 2 Puff(s) Inhalation two times a day  ampicillin/sulbactam  IVPB 3 Gram(s) IV Intermittent every 6 hours  chlorhexidine 2% Cloths 1 Application(s) Topical daily  dextrose 5%. 1000 milliLiter(s) (100 mL/Hr) IV Continuous <Continuous>  dextrose 5%. 1000 milliLiter(s) (50 mL/Hr) IV Continuous <Continuous>  dextrose 50% Injectable 25 Gram(s) IV Push once  dextrose 50% Injectable 12.5 Gram(s) IV Push once  dextrose 50% Injectable 25 Gram(s) IV Push once  glucagon  Injectable 1 milliGRAM(s) IntraMuscular once  heparin   Injectable 5000 Unit(s) SubCutaneous every 12 hours  insulin glargine Injectable (LANTUS) 8 Unit(s) SubCutaneous every morning  insulin lispro (ADMELOG) corrective regimen sliding scale   SubCutaneous every 6 hours  LORazepam     Tablet 1 milliGRAM(s) Oral every 4 hours  midodrine 15 milliGRAM(s) Oral every 8 hours  norepinephrine Infusion 0.05 MICROgram(s)/kG/Min (5.35 mL/Hr) IV Continuous <Continuous>  pantoprazole  Injectable 40 milliGRAM(s) IV Push daily  polyethylene glycol 3350 17 Gram(s) Oral every 12 hours  povidone iodine 10% Solution 1 Application(s) Topical daily  senna 2 Tablet(s) Oral at bedtime  sodium chloride 0.9%. 1000 milliLiter(s) (100 mL/Hr) IV Continuous <Continuous>    MEDICATIONS  (PRN):  dextrose Oral Gel 15 Gram(s) Oral once PRN Blood Glucose LESS THAN 70 milliGRAM(s)/deciliter  LORazepam   Injectable 2 milliGRAM(s) IV Push every 6 hours PRN Agitation  sodium chloride 0.9% lock flush 10 milliLiter(s) IV Push every 1 hour PRN Pre/post blood products, medications, blood draw, and to maintain line patency        11-29 @ 07:01 - 11-30 @ 07:00  --------------------------------------------------------  IN: 1825 mL / OUT: 400 mL / NET: 1425 mL    11-30 @ 07:01 - 11-30 @ 23:00  --------------------------------------------------------  IN: 925 mL / OUT: 0 mL / NET: 925 mL        RADIOLOGY/IMAGING/ECHO    Critical care point of care ultrasound:    Assessment/Plan:          CRITICAL CARE TIME SPENT: 37 minutes assessing presenting problems of acute illness, which pose high probability of life threatening deterioration or end organ damage/dysfunction, as well as medical decision making including initiating plan of care, reviewing data, reviewing radiologic exams, discussing with multidisciplinary team,  discussing goals of care with patient/family, and writing this note.  Non-inclusive of procedures performed,         Patient is a 77y old  Female who presents with a chief complaint of diaphoresis and fever (30 Nov 2022 12:43)    PAST MEDICAL & SURGICAL HISTORY:  Dementia of frontal lobe type      Aphasic stroke      Diabetes mellitus      Respiratory failure      Hypertension      GERD (gastroesophageal reflux disease)      Constipation      Respiratory failure      CVA (cerebral vascular accident)      HTN (hypertension)      DM (diabetes mellitus)      Advanced dementia      COVID-19 virus detected      Quadriplegia      Pneumonia      Type II diabetes mellitus      Hx of appendectomy      Gastrostomy in place      Tracheostomy in place      Tracheostomy tube present      Feeding by G-tube        BREA BECKHAM   77y    Female    BRIEF HOSPITAL COURSE: No new events     Review of Systems:                       All other ROS are negative.    Allergies    codeine (Hives)    Intolerances          ICU Vital Signs Last 24 Hrs  T(C): 36.1 (30 Nov 2022 19:29), Max: 37.4 (30 Nov 2022 01:00)  T(F): 97 (30 Nov 2022 19:29), Max: 99.4 (30 Nov 2022 01:00)  HR: 62 (30 Nov 2022 20:37) (59 - 108)  BP: 107/57 (30 Nov 2022 18:00) (89/55 - 141/56)  BP(mean): 73 (30 Nov 2022 18:00) (66 - 95)  ABP: --  ABP(mean): --  RR: 22 (30 Nov 2022 18:00) (0 - 29)  SpO2: 99% (30 Nov 2022 20:37) (84% - 100%)    O2 Parameters below as of 30 Nov 2022 07:45  Patient On (Oxygen Delivery Method): ventilator,80          Physical Examination:    General: Intermittent anxiety    HEENT: NCAT, +Trach    PULM: CTA BL    CVS: s1s2    ABD: soft    EXT: compartments soft     SKIN: Intact     Neuro: No new deficits     ABG - ( 29 Nov 2022 06:36 )  pH, Arterial: 7.47  pH, Blood: x     /  pCO2: 45    /  pO2: 64    / HCO3: 33    / Base Excess: 9.1   /  SaO2: 94.6              Mode: AC/ CMV (Assist Control/ Continuous Mandatory Ventilation)  RR (machine): 18  TV (machine): 350  FiO2: 100  PEEP: 8  ITime: 1  MAP: 17  PIP: 44    Mode: AC/ CMV (Assist Control/ Continuous Mandatory Ventilation), RR (machine): 18, TV (machine): 350, FiO2: 100, PEEP: 8, ITime: 1, MAP: 17, PIP: 44  LABS:                        9.2    12.90 )-----------( 175      ( 30 Nov 2022 05:55 )             30.8     11-30    139  |  101  |  25<H>  ----------------------------<  157<H>  4.4   |  30  |  1.14    Ca    8.4      30 Nov 2022 05:55  Phos  3.3     11-29  Mg     2.4     11-29    TPro  6.8  /  Alb  1.5<L>  /  TBili  0.6  /  DBili  x   /  AST  14  /  ALT  14  /  AlkPhos  111  11-29          CAPILLARY BLOOD GLUCOSE      POCT Blood Glucose.: 157 mg/dL (30 Nov 2022 18:30)  POCT Blood Glucose.: 183 mg/dL (30 Nov 2022 12:37)  POCT Blood Glucose.: 154 mg/dL (30 Nov 2022 07:55)  POCT Blood Glucose.: 142 mg/dL (30 Nov 2022 06:42)  POCT Blood Glucose.: 149 mg/dL (30 Nov 2022 00:26)        CULTURES:      Medications:  MEDICATIONS  (STANDING):  albuterol    90 MICROgram(s) HFA Inhaler 2 Puff(s) Inhalation two times a day  ampicillin/sulbactam  IVPB 3 Gram(s) IV Intermittent every 6 hours  chlorhexidine 2% Cloths 1 Application(s) Topical daily  dextrose 5%. 1000 milliLiter(s) (100 mL/Hr) IV Continuous <Continuous>  dextrose 5%. 1000 milliLiter(s) (50 mL/Hr) IV Continuous <Continuous>  dextrose 50% Injectable 25 Gram(s) IV Push once  dextrose 50% Injectable 12.5 Gram(s) IV Push once  dextrose 50% Injectable 25 Gram(s) IV Push once  glucagon  Injectable 1 milliGRAM(s) IntraMuscular once  heparin   Injectable 5000 Unit(s) SubCutaneous every 12 hours  insulin glargine Injectable (LANTUS) 8 Unit(s) SubCutaneous every morning  insulin lispro (ADMELOG) corrective regimen sliding scale   SubCutaneous every 6 hours  LORazepam     Tablet 1 milliGRAM(s) Oral every 4 hours  midodrine 15 milliGRAM(s) Oral every 8 hours  norepinephrine Infusion 0.05 MICROgram(s)/kG/Min (5.35 mL/Hr) IV Continuous <Continuous>  pantoprazole  Injectable 40 milliGRAM(s) IV Push daily  polyethylene glycol 3350 17 Gram(s) Oral every 12 hours  povidone iodine 10% Solution 1 Application(s) Topical daily  senna 2 Tablet(s) Oral at bedtime  sodium chloride 0.9%. 1000 milliLiter(s) (100 mL/Hr) IV Continuous <Continuous>    MEDICATIONS  (PRN):  dextrose Oral Gel 15 Gram(s) Oral once PRN Blood Glucose LESS THAN 70 milliGRAM(s)/deciliter  LORazepam   Injectable 2 milliGRAM(s) IV Push every 6 hours PRN Agitation  sodium chloride 0.9% lock flush 10 milliLiter(s) IV Push every 1 hour PRN Pre/post blood products, medications, blood draw, and to maintain line patency        11-29 @ 07:01 - 11-30 @ 07:00  --------------------------------------------------------  IN: 1825 mL / OUT: 400 mL / NET: 1425 mL    11-30 @ 07:01 - 11-30 @ 23:00  --------------------------------------------------------  IN: 925 mL / OUT: 0 mL / NET: 925 mL        RADIOLOGY/IMAGING/ECHO    Critical care point of care ultrasound:    Assessment/Plan:  76 y/o F pmhx of Dementia, COPD, chronic respiratory failure requiring trach and peg, now vent dependent, cardiac arrest, quadriplegia, CVA, and CKD presented to  ED w/ fevers and abnormal labs.    Assessment:  1. Acute on chronic hypoxic respiratory failure   2. Septic Shock, resolved  3. VAP, Acinetobacter baumannii.  4. Anoxic injury  s/p cardiac arrest       Plan:  Neuro: PRN Ativan  CV: Cont midodrine.   Resp: Vent support. +Trach  GI: PPI daily. Tolerating tube feeds  Renal: CKD at baseline. Monitoring I&Os. Trend BUN/Crt. electrolytes WNL   ID: Cont unasyn. F/u with ID  Heme: DVT ppx w/ heparin subq

## 2022-12-01 LAB
ALBUMIN SERPL ELPH-MCNC: 1.4 G/DL — LOW (ref 3.3–5)
ALP SERPL-CCNC: 118 U/L — SIGNIFICANT CHANGE UP (ref 30–120)
ALT FLD-CCNC: <10 U/L DA — LOW (ref 10–60)
ANION GAP SERPL CALC-SCNC: 6 MMOL/L — SIGNIFICANT CHANGE UP (ref 5–17)
AST SERPL-CCNC: 26 U/L — SIGNIFICANT CHANGE UP (ref 10–40)
BILIRUB SERPL-MCNC: 0.4 MG/DL — SIGNIFICANT CHANGE UP (ref 0.2–1.2)
BUN SERPL-MCNC: 9 MG/DL — SIGNIFICANT CHANGE UP (ref 7–23)
CALCIUM SERPL-MCNC: 8.1 MG/DL — LOW (ref 8.4–10.5)
CHLORIDE SERPL-SCNC: 103 MMOL/L — SIGNIFICANT CHANGE UP (ref 96–108)
CO2 SERPL-SCNC: 32 MMOL/L — HIGH (ref 22–31)
CREAT SERPL-MCNC: 1.05 MG/DL — SIGNIFICANT CHANGE UP (ref 0.5–1.3)
EGFR: 54 ML/MIN/1.73M2 — LOW
GLUCOSE SERPL-MCNC: 115 MG/DL — HIGH (ref 70–99)
HCT VFR BLD CALC: 24.3 % — LOW (ref 34.5–45)
HCT VFR BLD CALC: 26.2 % — LOW (ref 34.5–45)
HGB BLD-MCNC: 7.3 G/DL — LOW (ref 11.5–15.5)
HGB BLD-MCNC: 7.8 G/DL — LOW (ref 11.5–15.5)
MCHC RBC-ENTMCNC: 27.6 PG — SIGNIFICANT CHANGE UP (ref 27–34)
MCHC RBC-ENTMCNC: 27.8 PG — SIGNIFICANT CHANGE UP (ref 27–34)
MCHC RBC-ENTMCNC: 29.8 GM/DL — LOW (ref 32–36)
MCHC RBC-ENTMCNC: 30 GM/DL — LOW (ref 32–36)
MCV RBC AUTO: 92.4 FL — SIGNIFICANT CHANGE UP (ref 80–100)
MCV RBC AUTO: 92.6 FL — SIGNIFICANT CHANGE UP (ref 80–100)
NRBC # BLD: 0 /100 WBCS — SIGNIFICANT CHANGE UP (ref 0–0)
NRBC # BLD: 0 /100 WBCS — SIGNIFICANT CHANGE UP (ref 0–0)
PLATELET # BLD AUTO: 147 K/UL — LOW (ref 150–400)
PLATELET # BLD AUTO: 157 K/UL — SIGNIFICANT CHANGE UP (ref 150–400)
POTASSIUM SERPL-MCNC: 4.2 MMOL/L — SIGNIFICANT CHANGE UP (ref 3.5–5.3)
POTASSIUM SERPL-SCNC: 4.2 MMOL/L — SIGNIFICANT CHANGE UP (ref 3.5–5.3)
PROT SERPL-MCNC: 6.5 G/DL — SIGNIFICANT CHANGE UP (ref 6–8.3)
RBC # BLD: 2.63 M/UL — LOW (ref 3.8–5.2)
RBC # BLD: 2.83 M/UL — LOW (ref 3.8–5.2)
RBC # FLD: 16.3 % — HIGH (ref 10.3–14.5)
RBC # FLD: 16.4 % — HIGH (ref 10.3–14.5)
SODIUM SERPL-SCNC: 141 MMOL/L — SIGNIFICANT CHANGE UP (ref 135–145)
WBC # BLD: 5.51 K/UL — SIGNIFICANT CHANGE UP (ref 3.8–10.5)
WBC # BLD: 5.85 K/UL — SIGNIFICANT CHANGE UP (ref 3.8–10.5)
WBC # FLD AUTO: 5.51 K/UL — SIGNIFICANT CHANGE UP (ref 3.8–10.5)
WBC # FLD AUTO: 5.85 K/UL — SIGNIFICANT CHANGE UP (ref 3.8–10.5)

## 2022-12-01 PROCEDURE — 99232 SBSQ HOSP IP/OBS MODERATE 35: CPT

## 2022-12-01 RX ADMIN — HEPARIN SODIUM 5000 UNIT(S): 5000 INJECTION INTRAVENOUS; SUBCUTANEOUS at 05:42

## 2022-12-01 RX ADMIN — ALBUTEROL 2 PUFF(S): 90 AEROSOL, METERED ORAL at 20:31

## 2022-12-01 RX ADMIN — Medication 2 MILLIGRAM(S): at 13:36

## 2022-12-01 RX ADMIN — HEPARIN SODIUM 5000 UNIT(S): 5000 INJECTION INTRAVENOUS; SUBCUTANEOUS at 17:29

## 2022-12-01 RX ADMIN — AMPICILLIN SODIUM AND SULBACTAM SODIUM 200 GRAM(S): 250; 125 INJECTION, POWDER, FOR SUSPENSION INTRAMUSCULAR; INTRAVENOUS at 11:15

## 2022-12-01 RX ADMIN — AMPICILLIN SODIUM AND SULBACTAM SODIUM 200 GRAM(S): 250; 125 INJECTION, POWDER, FOR SUSPENSION INTRAMUSCULAR; INTRAVENOUS at 17:29

## 2022-12-01 RX ADMIN — ALBUTEROL 2 PUFF(S): 90 AEROSOL, METERED ORAL at 07:07

## 2022-12-01 RX ADMIN — SENNA PLUS 2 TABLET(S): 8.6 TABLET ORAL at 21:59

## 2022-12-01 RX ADMIN — Medication 1 MILLIGRAM(S): at 01:59

## 2022-12-01 RX ADMIN — PANTOPRAZOLE SODIUM 40 MILLIGRAM(S): 20 TABLET, DELAYED RELEASE ORAL at 11:14

## 2022-12-01 RX ADMIN — Medication 1 MILLIGRAM(S): at 05:42

## 2022-12-01 RX ADMIN — INSULIN GLARGINE 8 UNIT(S): 100 INJECTION, SOLUTION SUBCUTANEOUS at 08:30

## 2022-12-01 RX ADMIN — CHLORHEXIDINE GLUCONATE 1 APPLICATION(S): 213 SOLUTION TOPICAL at 05:42

## 2022-12-01 RX ADMIN — Medication 1 APPLICATION(S): at 11:15

## 2022-12-01 RX ADMIN — MIDODRINE HYDROCHLORIDE 15 MILLIGRAM(S): 2.5 TABLET ORAL at 21:58

## 2022-12-01 RX ADMIN — MIDODRINE HYDROCHLORIDE 15 MILLIGRAM(S): 2.5 TABLET ORAL at 05:41

## 2022-12-01 RX ADMIN — Medication 1 MILLIGRAM(S): at 21:59

## 2022-12-01 RX ADMIN — Medication 1 MILLIGRAM(S): at 11:14

## 2022-12-01 RX ADMIN — MIDODRINE HYDROCHLORIDE 15 MILLIGRAM(S): 2.5 TABLET ORAL at 13:41

## 2022-12-01 RX ADMIN — Medication 2: at 17:28

## 2022-12-01 RX ADMIN — AMPICILLIN SODIUM AND SULBACTAM SODIUM 200 GRAM(S): 250; 125 INJECTION, POWDER, FOR SUSPENSION INTRAMUSCULAR; INTRAVENOUS at 05:40

## 2022-12-01 RX ADMIN — Medication 1 MILLIGRAM(S): at 13:42

## 2022-12-01 RX ADMIN — Medication 1 MILLIGRAM(S): at 17:29

## 2022-12-01 RX ADMIN — Medication 2 MILLIGRAM(S): at 06:57

## 2022-12-01 NOTE — PROGRESS NOTE ADULT - SUBJECTIVE AND OBJECTIVE BOX
BREA BECKHAM     [facility]    Allergies    codeine (Hives)    Intolerances        PAST MEDICAL & SURGICAL HISTORY:  Dementia of frontal lobe type      Aphasic stroke      Diabetes mellitus      Respiratory failure      Hypertension      GERD (gastroesophageal reflux disease)      Constipation      Respiratory failure      CVA (cerebral vascular accident)      HTN (hypertension)      DM (diabetes mellitus)      Advanced dementia      COVID-19 virus detected      Quadriplegia      Pneumonia      Type II diabetes mellitus      Hx of appendectomy      Gastrostomy in place      Tracheostomy in place      Tracheostomy tube present      Feeding by G-tube          FAMILY HISTORY:  No pertinent family history in first degree relatives        Home Medications:  Admelog 100 units/mL injectable solution: injectable every 6 hours SS (21 Nov 2022 05:08)  albuterol 90 mcg/inh inhalation aerosol with adapter: 2  inhaled every 6 hours (21 Nov 2022 04:24)  Bacid (LAC) oral tablet: 2 tab(s) by gastrostomy tube once a day (21 Nov 2022 04:24)  bacitracin 500 units/g topical ointment: Apply topically to affected area once a day to knees (21 Nov 2022 04:24)  chlorhexidine 0.12% mucous membrane liquid: 15 milliliter(s) mucous membrane 2 times a day (21 Nov 2022 04:24)  Dakins Full Strength 0.5% topical solution: Apply topically to affected area 2 times a day then apply santyl and calcium alginate (21 Nov 2022 04:24)  Eucerin topical cream: Apply topically to affected area once a day bilateral feet (21 Nov 2022 04:24)  ferrous sulfate 325 mg (65 mg elemental iron) oral tablet: 1 tab(s) by gastrostomy tube once a day (21 Nov 2022 04:24)  insulin glargine 100 units/mL subcutaneous solution: 8 unit(s) subcutaneous once a day (in the morning) (21 Nov 2022 04:24)  ipratropium-albuterol 0.5 mg-2.5 mg/3 mL inhalation solution: 3 milliliter(s) inhaled every 6 hours (21 Nov 2022 04:24)  LORazepam 1 mg oral tablet: 1 tab(s) by gastrostomy tube every 4 hours (21 Nov 2022 04:24)  midodrine 10 mg oral tablet: 1 tab(s) by gastrostomy tube every 8 hours (21 Nov 2022 04:24)  MiraLax oral powder for reconstitution: orally once a day (at bedtime) (21 Nov 2022 05:08)  mulivitamin: by gastrostomy tube once a day (21 Nov 2022 04:24)  nystatin 100,000 units/g topical powder: 1 application topically 3 times a day (21 Nov 2022 04:24)  omeprazole 40 mg oral delayed release capsule: 1 cap(s) by gastrostomy tube 2 times a day (21 Nov 2022 04:24)  polyethylene glycol 3350 oral powder for reconstitution: 17 gram(s) by gastrostomy tube every 12 hours (21 Nov 2022 04:24)  senna 8.6 mg oral tablet: 3 tab(s) by gastrostomy tube once a day (at bedtime) (21 Nov 2022 04:24)  simethicone 80 mg oral tablet, chewable: 1 tab(s) by gastrostomy tube every 6 hours (21 Nov 2022 04:24)  sucralfate 1 g/10 mL oral suspension: 10 milliliter(s) g-tube 4 times a day (before meals and at bedtime) (21 Nov 2022 04:24)  Tylenol 325 mg oral tablet: 2 tab(s) orally every 6 hours, As Needed prior to dressing change (21 Nov 2022 04:24)      MEDICATIONS  (STANDING):  albuterol    90 MICROgram(s) HFA Inhaler 2 Puff(s) Inhalation two times a day  ampicillin/sulbactam  IVPB 3 Gram(s) IV Intermittent every 6 hours  chlorhexidine 2% Cloths 1 Application(s) Topical daily  dextrose 5%. 1000 milliLiter(s) (100 mL/Hr) IV Continuous <Continuous>  dextrose 5%. 1000 milliLiter(s) (50 mL/Hr) IV Continuous <Continuous>  dextrose 50% Injectable 25 Gram(s) IV Push once  dextrose 50% Injectable 12.5 Gram(s) IV Push once  dextrose 50% Injectable 25 Gram(s) IV Push once  glucagon  Injectable 1 milliGRAM(s) IntraMuscular once  heparin   Injectable 5000 Unit(s) SubCutaneous every 12 hours  insulin glargine Injectable (LANTUS) 8 Unit(s) SubCutaneous every morning  insulin lispro (ADMELOG) corrective regimen sliding scale   SubCutaneous every 6 hours  LORazepam     Tablet 1 milliGRAM(s) Oral every 4 hours  midodrine 15 milliGRAM(s) Oral every 8 hours  norepinephrine Infusion 0.05 MICROgram(s)/kG/Min (5.35 mL/Hr) IV Continuous <Continuous>  pantoprazole  Injectable 40 milliGRAM(s) IV Push daily  polyethylene glycol 3350 17 Gram(s) Oral every 12 hours  povidone iodine 10% Solution 1 Application(s) Topical daily  senna 2 Tablet(s) Oral at bedtime  sodium chloride 0.9%. 1000 milliLiter(s) (100 mL/Hr) IV Continuous <Continuous>    MEDICATIONS  (PRN):  dextrose Oral Gel 15 Gram(s) Oral once PRN Blood Glucose LESS THAN 70 milliGRAM(s)/deciliter  LORazepam   Injectable 2 milliGRAM(s) IV Push every 6 hours PRN Agitation  sodium chloride 0.9% lock flush 10 milliLiter(s) IV Push every 1 hour PRN Pre/post blood products, medications, blood draw, and to maintain line patency      Diet, NPO with Tube Feed:   Tube Feeding Modality: Gastrostomy  Glucerna 1.5 Horacio  Total Volume for 24 Hours (mL): 1000  Continuous  Starting Tube Feed Rate mL per Hour: 10  Increase Tube Feed Rate by (mL): 10     Every 10 hours  Until Goal Tube Feed Rate (mL per Hour): 50  Tube Feed Duration (in Hours): 20  Tube Feed Start Time: 17:00  Free Water Flush  Pump   Rate (mL per Hour): 25   Frequency: Every Hour    Duration (Hours): 24 (11-21-22 @ 05:10) [Active]          Vital Signs Last 24 Hrs  T(C): 36.2 (30 Nov 2022 23:00), Max: 36.6 (30 Nov 2022 12:30)  T(F): 97.2 (30 Nov 2022 23:00), Max: 97.9 (30 Nov 2022 12:30)  HR: 68 (01 Dec 2022 07:23) (55 - 103)  BP: 118/58 (01 Dec 2022 04:00) (89/55 - 141/56)  BP(mean): 78 (01 Dec 2022 04:00) (66 - 95)  RR: 14 (01 Dec 2022 04:00) (0 - 34)  SpO2: 99% (01 Dec 2022 07:23) (94% - 100%)    Parameters below as of 01 Dec 2022 07:21  Patient On (Oxygen Delivery Method): ventilator          11-30-22 @ 07:01  -  12-01-22 @ 07:00  --------------------------------------------------------  IN: 1925 mL / OUT: 0 mL / NET: 1925 mL        Mode: AC/ CMV (Assist Control/ Continuous Mandatory Ventilation), RR (machine): 18, TV (machine): 350, FiO2: 100, PEEP: 8, ITime: 1, MAP: 16, PIP: 44      LABS:                        7.3    5.51  )-----------( 147      ( 01 Dec 2022 05:58 )             24.3     12-01    141  |  103  |  9   ----------------------------<  115<H>  4.2   |  32<H>  |  1.05    Ca    8.1<L>      01 Dec 2022 05:58    TPro  6.5  /  Alb  1.4<L>  /  TBili  0.4  /  DBili  x   /  AST  26  /  ALT  <10<L>  /  AlkPhos  118  12-01              WBC:  WBC Count: 5.51 K/uL (12-01 @ 05:58)  WBC Count: 12.90 K/uL (11-30 @ 05:55)  WBC Count: 9.14 K/uL (11-29 @ 06:51)  WBC Count: 11.08 K/uL (11-28 @ 07:08)  WBC Count: 9.55 K/uL (11-27 @ 20:10)      MICROBIOLOGY:  RECENT CULTURES:                  Sodium:  Sodium, Serum: 141 mmol/L (12-01 @ 05:58)  Sodium, Serum: 139 mmol/L (11-30 @ 05:55)  Sodium, Serum: 139 mmol/L (11-29 @ 06:51)  Sodium, Serum: 137 mmol/L (11-28 @ 07:08)  Sodium, Serum: 137 mmol/L (11-27 @ 20:10)      1.05 mg/dL 12-01 @ 05:58  1.14 mg/dL 11-30 @ 05:55  1.18 mg/dL 11-29 @ 06:51  1.20 mg/dL 11-28 @ 07:08  1.20 mg/dL 11-27 @ 20:10      Hemoglobin:  Hemoglobin: 7.3 g/dL (12-01 @ 05:58)  Hemoglobin: 9.2 g/dL (11-30 @ 05:55)  Hemoglobin: 8.1 g/dL (11-29 @ 06:51)  Hemoglobin: 8.7 g/dL (11-28 @ 07:08)  Hemoglobin: 6.7 g/dL (11-27 @ 20:10)      Platelets: Platelet Count - Automated: 147 K/uL (12-01 @ 05:58)  Platelet Count - Automated: 175 K/uL (11-30 @ 05:55)  Platelet Count - Automated: 152 K/uL (11-29 @ 06:51)  Platelet Count - Automated: 167 K/uL (11-28 @ 07:08)  Platelet Count - Automated: 160 K/uL (11-27 @ 20:10)      LIVER FUNCTIONS - ( 01 Dec 2022 05:58 )  Alb: 1.4 g/dL / Pro: 6.5 g/dL / ALK PHOS: 118 U/L / ALT: <10 U/L DA / AST: 26 U/L / GGT: x                 RADIOLOGY & ADDITIONAL STUDIES:      MICROBIOLOGY:  RECENT CULTURES:

## 2022-12-01 NOTE — PROGRESS NOTE ADULT - SUBJECTIVE AND OBJECTIVE BOX
Patient is a 77y old  Female who presents with a chief complaint of diaphoresis and fever (01 Dec 2022 11:55)      INTERVAL HPI/OVERNIGHT EVENTS:    hb low at 7.3 in AM, repeat is 7.8    pt is still requiring 100%o2 and unable to wean.     last day of IV abx today    MEDICATIONS  (STANDING):  albuterol    90 MICROgram(s) HFA Inhaler 2 Puff(s) Inhalation two times a day  ampicillin/sulbactam  IVPB 3 Gram(s) IV Intermittent every 6 hours  chlorhexidine 2% Cloths 1 Application(s) Topical daily  dextrose 5%. 1000 milliLiter(s) (50 mL/Hr) IV Continuous <Continuous>  dextrose 5%. 1000 milliLiter(s) (100 mL/Hr) IV Continuous <Continuous>  dextrose 50% Injectable 25 Gram(s) IV Push once  dextrose 50% Injectable 12.5 Gram(s) IV Push once  dextrose 50% Injectable 25 Gram(s) IV Push once  glucagon  Injectable 1 milliGRAM(s) IntraMuscular once  heparin   Injectable 5000 Unit(s) SubCutaneous every 12 hours  insulin glargine Injectable (LANTUS) 8 Unit(s) SubCutaneous every morning  insulin lispro (ADMELOG) corrective regimen sliding scale   SubCutaneous every 6 hours  LORazepam     Tablet 1 milliGRAM(s) Oral every 4 hours  midodrine 15 milliGRAM(s) Oral every 8 hours  norepinephrine Infusion 0.05 MICROgram(s)/kG/Min (5.35 mL/Hr) IV Continuous <Continuous>  pantoprazole  Injectable 40 milliGRAM(s) IV Push daily  polyethylene glycol 3350 17 Gram(s) Oral every 12 hours  povidone iodine 10% Solution 1 Application(s) Topical daily  senna 2 Tablet(s) Oral at bedtime  sodium chloride 0.9%. 1000 milliLiter(s) (100 mL/Hr) IV Continuous <Continuous>    MEDICATIONS  (PRN):  dextrose Oral Gel 15 Gram(s) Oral once PRN Blood Glucose LESS THAN 70 milliGRAM(s)/deciliter  LORazepam   Injectable 2 milliGRAM(s) IV Push every 6 hours PRN Agitation  sodium chloride 0.9% lock flush 10 milliLiter(s) IV Push every 1 hour PRN Pre/post blood products, medications, blood draw, and to maintain line patency      Allergies    codeine (Hives)    Intolerances        REVIEW OF SYSTEMS:  unable to obtain 2/2 mental state    Vital Signs Last 24 Hrs  T(C): 36.6 (01 Dec 2022 06:00), Max: 36.6 (01 Dec 2022 06:00)  T(F): 97.8 (01 Dec 2022 06:00), Max: 97.8 (01 Dec 2022 06:00)  HR: 73 (01 Dec 2022 12:00) (54 - 112)  BP: 128/70 (01 Dec 2022 12:00) (89/55 - 185/84)  BP(mean): 89 (01 Dec 2022 12:00) (63 - 110)  RR: 32 (01 Dec 2022 12:00) (0 - 34)  SpO2: 95% (01 Dec 2022 12:00) (79% - 100%)    Parameters below as of 01 Dec 2022 12:00  Patient On (Oxygen Delivery Method): ventilator        PHYSICAL EXAM:  GENERAL: chronically ill appearing, emaciated, NAD, obtunded  HEAD: atraumatic  EYES: conjunctiva clear  NECK: (+)trach in place, clean  RESPIRATORY: mechanical breath sounds, diminished, vent in place, no gross crackles or rales  CARDIOVASCULAR:  regular rate and rhythm, no murmurs or rubs or gallops, 2+ peripheral pulses  GASTROINTESTINAL:  soft, +PEG in place, nondistended, bowel sounds present  EXTREMITIES: no clubbing or cyanosis or edema  MUSCULOSKELETAL: (+)diffuse contractures in all joints  NERVOUS SYSTEM: does not respond to pain    LABS:                        7.8    5.85  )-----------( 157      ( 01 Dec 2022 12:00 )             26.2     01 Dec 2022 05:58    141    |  103    |  9      ----------------------------<  115    4.2     |  32     |  1.05     Ca    8.1        01 Dec 2022 05:58    TPro  6.5    /  Alb  1.4    /  TBili  0.4    /  DBili  x      /  AST  26     /  ALT  <10    /  AlkPhos  118    01 Dec 2022 05:58        CAPILLARY BLOOD GLUCOSE      POCT Blood Glucose.: 154 mg/dL (01 Dec 2022 11:50)  POCT Blood Glucose.: 152 mg/dL (01 Dec 2022 08:28)  POCT Blood Glucose.: 118 mg/dL (01 Dec 2022 05:38)  POCT Blood Glucose.: 137 mg/dL (30 Nov 2022 23:40)  POCT Blood Glucose.: 157 mg/dL (30 Nov 2022 18:30)      RADIOLOGY & ADDITIONAL TESTS:

## 2022-12-01 NOTE — PROGRESS NOTE ADULT - ASSESSMENT
The patient is a 77 year old female with a history of HTN, DM, CVA, dementia, chronic respiratory failure s/p trach, PEG, cardiac arrest, anemia, pleural effusions who presents with fever and respiratory distress.    Plan:  - Repeat echo with normal LV systolic function and worsened pulmonary pressures, now severe pulm HTN  - CXR with diffuse lung infiltrates  - Continue midodrine 15 mg tid  - Low oncotic pressure leading to third spacing - diuretics likely to have minimal long term effect but can attempt if needed with worsening hypoxia  - Pulm and ID follow-up  - Comfort care would be most appropriate

## 2022-12-01 NOTE — PROGRESS NOTE ADULT - ASSESSMENT
77F with dementia, COPD with chronic respiratory failure s/p trach, cardiac arrest, CHH, quadriplegia, CVA, CKD, anemia, PEG tube, and multiple hospital admissions for respiratory distress presents to the ED BIBEMS from [facility] for diaphoresis and fever.     Readmitted with sepsis with shock  2/2 recurent VAP.    Sputum cx 11/21 with Acinetobacter baumannii/nosocom group (Carbapenem Resistant) resistant to Bactrim    Plan:  Cont Unasyn x 7 days until 12/1, last full day today  Trend temps and cbc  Asp precautions  Aggressive pulm toileting  Isolation per infection control policy  Vent management per ICU  on midodrine, pressors prn    Poor prognosis  Continued GOC discussion encouraged    Infectious Diseases will continue to follow. Please call with any questions.   Andressa Brantley M.D.  [facility] Division of Infectious Diseases [phone]

## 2022-12-01 NOTE — PROGRESS NOTE ADULT - SUBJECTIVE AND OBJECTIVE BOX
Date/Time Patient Seen:  		  Referring MD:   Data Reviewed	       Patient is a 77y old  Female who presents with a chief complaint of diaphoresis and fever (30 Nov 2022 23:00)      Subjective/HPI     PAST MEDICAL & SURGICAL HISTORY:  Dementia of frontal lobe type    Aphasic stroke    Diabetes mellitus    Respiratory failure    Hypertension    GERD (gastroesophageal reflux disease)    Constipation    Respiratory failure    CVA (cerebral vascular accident)    HTN (hypertension)    DM (diabetes mellitus)    Advanced dementia    COVID-19 virus detected    Quadriplegia    Pneumonia    Type II diabetes mellitus    Hx of appendectomy    Gastrostomy in place    Tracheostomy in place    Tracheostomy tube present    Feeding by G-tube          Medication list         MEDICATIONS  (STANDING):  albuterol    90 MICROgram(s) HFA Inhaler 2 Puff(s) Inhalation two times a day  ampicillin/sulbactam  IVPB 3 Gram(s) IV Intermittent every 6 hours  chlorhexidine 2% Cloths 1 Application(s) Topical daily  dextrose 5%. 1000 milliLiter(s) (50 mL/Hr) IV Continuous <Continuous>  dextrose 5%. 1000 milliLiter(s) (100 mL/Hr) IV Continuous <Continuous>  dextrose 50% Injectable 12.5 Gram(s) IV Push once  dextrose 50% Injectable 25 Gram(s) IV Push once  dextrose 50% Injectable 25 Gram(s) IV Push once  glucagon  Injectable 1 milliGRAM(s) IntraMuscular once  heparin   Injectable 5000 Unit(s) SubCutaneous every 12 hours  insulin glargine Injectable (LANTUS) 8 Unit(s) SubCutaneous every morning  insulin lispro (ADMELOG) corrective regimen sliding scale   SubCutaneous every 6 hours  LORazepam     Tablet 1 milliGRAM(s) Oral every 4 hours  midodrine 15 milliGRAM(s) Oral every 8 hours  norepinephrine Infusion 0.05 MICROgram(s)/kG/Min (5.35 mL/Hr) IV Continuous <Continuous>  pantoprazole  Injectable 40 milliGRAM(s) IV Push daily  polyethylene glycol 3350 17 Gram(s) Oral every 12 hours  povidone iodine 10% Solution 1 Application(s) Topical daily  senna 2 Tablet(s) Oral at bedtime  sodium chloride 0.9%. 1000 milliLiter(s) (100 mL/Hr) IV Continuous <Continuous>    MEDICATIONS  (PRN):  dextrose Oral Gel 15 Gram(s) Oral once PRN Blood Glucose LESS THAN 70 milliGRAM(s)/deciliter  LORazepam   Injectable 2 milliGRAM(s) IV Push every 6 hours PRN Agitation  sodium chloride 0.9% lock flush 10 milliLiter(s) IV Push every 1 hour PRN Pre/post blood products, medications, blood draw, and to maintain line patency         Vitals log        ICU Vital Signs Last 24 Hrs  T(C): 36.2 (30 Nov 2022 23:00), Max: 36.6 (30 Nov 2022 12:30)  T(F): 97.2 (30 Nov 2022 23:00), Max: 97.9 (30 Nov 2022 12:30)  HR: 61 (01 Dec 2022 05:13) (55 - 103)  BP: 118/58 (01 Dec 2022 04:00) (89/55 - 141/56)  BP(mean): 78 (01 Dec 2022 04:00) (66 - 95)  ABP: --  ABP(mean): --  RR: 14 (01 Dec 2022 04:00) (0 - 34)  SpO2: 100% (01 Dec 2022 05:13) (94% - 100%)    O2 Parameters below as of 01 Dec 2022 04:00  Patient On (Oxygen Delivery Method): ventilator    O2 Concentration (%): 100         Mode: AC/ CMV (Assist Control/ Continuous Mandatory Ventilation)  RR (machine): 18  TV (machine): 350  FiO2: 100  PEEP: 8  ITime: 1  MAP: 15  PIP: 42      Input and Output:  I&O's Detail    30 Nov 2022 07:01  -  01 Dec 2022 07:00  --------------------------------------------------------  IN:    Enteral Tube Flush: 525 mL    Glucerna 1.5: 1100 mL    IV PiggyBack: 300 mL  Total IN: 1925 mL    OUT:  Total OUT: 0 mL    Total NET: 1925 mL          Lab Data                        7.3    5.51  )-----------( 147      ( 01 Dec 2022 05:58 )             24.3     12-01    141  |  103  |  9   ----------------------------<  115<H>  4.2   |  32<H>  |  1.05    Ca    8.1<L>      01 Dec 2022 05:58    TPro  6.5  /  Alb  1.4<L>  /  TBili  0.4  /  DBili  x   /  AST  26  /  ALT  <10<L>  /  AlkPhos  118  12-01            Review of Systems	      Objective     Physical Examination  heart s1s2  lung dec BS  head nc        Pertinent Lab findings & Imaging      Annalise:  NO   Adequate UO     I&O's Detail    30 Nov 2022 07:01  -  01 Dec 2022 07:00  --------------------------------------------------------  IN:    Enteral Tube Flush: 525 mL    Glucerna 1.5: 1100 mL    IV PiggyBack: 300 mL  Total IN: 1925 mL    OUT:  Total OUT: 0 mL    Total NET: 1925 mL               Discussed with:     Cultures:	        Radiology

## 2022-12-01 NOTE — PATIENT PROFILE ADULT - FUNCTIONAL ASSESSMENT - BASIC MOBILITY 6.
1-calculated by average/Not able to assess (calculate score using [facility] averaging method)

## 2022-12-01 NOTE — PROGRESS NOTE ADULT - ASSESSMENT
REVIEW OF SYMPTOMS      Able to give (reliable) ROS  NO     PHYSICAL EXAM    HEENT Unremarkable  atraumatic   RESP Fair air entry EXP prolonged    Harsh breath sound Resp distres mild   CARDIAC S1 S2 No S3     NO JVD    ABDOMEN SOFT BS PRESENT NOT DISTENDED No hepatosplenomegaly   PEDAL EDEMA present No calf tenderness  NO rash       GENERAL DATA .   GOC.  full code      ALLGY.     codeine                        WT. 11/21/2022 57  BMI.    11/21/2022 21                          ICU STAY.   admitted ICU 11/21/2022  COVID.  Scv2 11/21/2022 scv2 (-)    PROCEDURE.  11/21/2022 [facility] central line    BEST PRACTICE ISSUES.    HOB ELEVATN. Yes  DVT PPLX.  11/21/2022 hpsc    SQUIRES PPLX.    11/21/2022 protonix 40   INFN PPLX.    11/21/2022 chlorhexidine .12% bid (mrsa)   11/21/2022 chlorhexidine 2% (c li)   SP SW EM.         DIET.  11/21/2022 glucerna 1.5 1000 gt      VS/ PO/IO/ VENT/ DRIPS.  12/1/2022 afeb 74 120/70   12/1/2022 ac 18/350/8/100     CURRENT ADMISSION  Pt sent back from [facility] 11/21/2022 with fever abn labs Found yto be in shock Norepi started 11/21/2022   Pulm crit care consulted      RECENT ADMISSIONS.  11/12-11/16/2022 11/4-11/10/2022  11/5 stenotrophomonas  uc 11/4 vr enterococc 50-99 candida   11/4/2022 levaquin 750 dced 11/7 doxy  11/8 bactrim 250.3  10/18-11/2/2022 11/21 ADMISSION PROBLEMS.  Vent dependent   .. Trach poa 11/21/2022  .. ac 18/350/8/100   RO VTE   .. 11/26 v duplx (-)   RIJ 11/21/2022   INFECTN   .. Decub ulcers   .. VAP 11/21/2022  ...... trach 11/21 mod acinetobacter carbapenem resist   ...... Bactrim tid  11/21-11/25/2022  ...... 11/25/2022 Unasyn 3.4 x 7d Dr Chariez    COPD  Shock   .. Norepi 11/21/2022-> 11/21-11/27  .. midodrine 11/21/2022   peg poa 11/21/2022  ANEMIA   .. Hb 11/25/2022 Hb 7.5  Hyponatremia  .. Na 11/24-11/26/2022 Na 130 - 135  Hyperkalemia .  .. k 11/26/2022 k 5.7       ASSESSMENT/RECOMMENDATIONS .   RESP.  .. Gas exchange.  11/29/2022 ac 18/350/8/80 747/45/64   11/21/2022 4a On 100% vent 759/36/273   Monitor & target po 90-95%  .. VENT MANAGEMENT.   HOB elevation  Target Pplat 30 (-)  Target PO 90-95%  Target pH 730 (+)  Daily spontaneous breathing trials   Daily sedation vacation   .. Pleural effsn .   CXR 11/21/2022 r gr than l effsn  Effusion has been tapped during prev admissions and is lp exudate If fever or worsening sepsis will plan thorac  RO VTE.  11/26 v duplx (-)   11/26/2022 check cta ch   11/29/2022 attempts made to trasport pat several times last few d but pt becomes unstable when tried to move to ct per staff   .. Bronchospasm.  11/21/2022 albut hfa 2p bid   INFECTION.  .. SCV2 status.  Scv2 11/21/2022 scv2 (-)  .. VAP   w 11/21-11/23-11/24-11/25-11/26-11/27-11/28-11/29-11/30-12/1/2022   w 16 - 8.8 - 12- 9 - 9.4- 9.1 - 11  - 9.1 - 12.9 - 5.8   Pr 11/21-11/22/2022 pr 1.8 - 1.1  ua 11/21/2022 w 3-5   cxr 11/21 mod to large r effsn somewhat incr from 11/12 central infiltrates possibly congestive rij   rvp 11/21/2022 (-)   rsv 11/21/2022 (-)   uc 11/21 (-)   legn 11/21 (-)   trach 11/21 mod acinetobacter carbapenem resist   mrsa 11/21 (-)   bc 11/21 (-)   11/21/2022 bactrim 285 mg trimeth q 8 h Dr BRITTANY Brantley DCED 11/25/2022 11/25/2022 Unasyn 3.4 x 7d Dr Chairez    CARDIAC.  .. Shock.    11/21/2022 midodrine 10.3 -> 11/22 midodr 15.3   11/21/2022 5a norepi 8 in 250   target map 65 (+)   .. ekg changes.  ekg 11/21/2022 s tach shoirt pr qtc 470 possible rvh   tr 11/22/2022 tr 28   .. CHF  bnp 11/30/2022 70714  11/30/2022 checkj echo   GI.  .. Nutrition.   11/21/2022 glucerna 1.5 1000 gt    HEMAT.  .. DVT pplx.   11/21/2022 hpsc    .. anemia.  Hb 11/21-11/22-11/23-11/24-11/25-11/26-11/28-11/29-11/30/-12/12022   Hb 9.6- 7.8 - 7.7 - 7.7- 7.5 - 7.5  - 8.7 - 8.1 - 9.2 - 7.8   mcv 11/21/2022 mcv 90   inr 11/21/2022 inr 122   monitor target Hb 7 (+)   RENAL.  .. Hyperkalemia .  k 11/26-11/27-11/29/2022 k 5.7 - 6 - 4.2    11/26/2022 lokelma dose   11/27/2022 lokelma 10.3x2d   ENDOCRINE.   .. DM   11/21/2022 glarg 8 q am   11/21/2022 riss   NEURO.  .. seizures.  11/21/2022 lorazepam 1.6     TIME SPENT   Over 39 minutes aggregate critical care time spent on encounter; activities included   direct patient care, counseling and/or coordinating care reviewing notes, lab data/ imaging , discussion with multidisciplinary team/ patient  /family and explaining in detail risks, benefits, alternatives  of the recommendations     CHAPINCITO GUIDRY 76 f [facility] S 11/21/2022   DR JOSEPH DU

## 2022-12-01 NOTE — PROGRESS NOTE ADULT - ASSESSMENT
77F with dementia, COPD with chronic respiratory failure s/p trach, cardiac arrest, CHH, quadriplegia, CVA, CKD, anemia, PEG tube, and multiple hospital admissions for respiratory distress presents to the ED BIBEMS from [facility] for diaphoresis and fever.         ANemia  multifacotrial/ AICD   s/p 1 unit prbc since admission   H and H stable   continue supportive care    Sepsis 2/2 VAP - meets sepsis based on fever + tachycardia + source of infection.  Lactate 1.9  peristent hypotension  Completed unasyn today  - cont with vent settings to maintain SaO2 >92%  - cont with midodrine 15mg TID, pressors PRN  - on ativan for agitation  - MRSA neg, trach culture +acinetobacter   wean as tolerating   Trend temps and cbc    Hyperkalemia  resolved  s/p lokelma     Hyponatremia  - Na of 130 today, likely hypovolemic  - urine lytes ordered  - continue to trend    Leukocytosis  - WBC WNL  - no fevers  comleted unasyn  - continue to trend     DM2 on insulin  - cont with lantus and insulin coverage scale with FS q6h while on TF    COPD   - cont with neb treatments    Anemia of chronic disease   - GI eval - conservative management   - cont with ferrous sulfate  - trend CBC    Prognosis grave, patient remains critically ill and is at high risk for abrupt decompensation and death

## 2022-12-01 NOTE — PROGRESS NOTE ADULT - ASSESSMENT
77F with dementia, COPD with chronic respiratory failure s/p trach, cardiac arrest, CHH, quadriplegia, CVA, CKD, anemia, PEG tube, and multiple hospital admissions for respiratory distress presents to the ED BIBEMS from [facility] for diaphoresis and fever.       abx  ativan  s/p IVF  vent support    GOC   pt is full code   is full code  assist with needs -   oral hygiene  skin care  recurrent admissions  long term resident of SNF  vent dep  anoxic brain injury - dementia - vegetative state

## 2022-12-01 NOTE — PROGRESS NOTE ADULT - SUBJECTIVE AND OBJECTIVE BOX
INTERVAL HPI/OVERNIGHT EVENTS:  No new overnight event.  No N/V/D.  Tolerating diet via peg  Allergies    codeine (Hives)    Intolerances    General:  No wt loss, fevers, chills, night sweats, fatigue,   Eyes:  Good vision, no reported pain  ENT:  No sore throat, pain, runny nose, dysphagia  CV:  No pain, palpitations, hypo/hypertension  Resp:  No dyspnea, cough, tachypnea, wheezing  GI:  No pain, No nausea, No vomiting, No diarrhea, No constipation, No weight loss, No fever, No pruritis, No rectal bleeding, No tarry stools, No dysphagia,  :  No pain, bleeding, incontinence, nocturia  Muscle:  No pain, weakness  Neuro:  No weakness, tingling, memory problems  Psych:  No fatigue, insomnia, mood problems, depression  Endocrine:  No polyuria, polydipsia, cold/heat intolerance  Heme:  No petechiae, ecchymosis, easy bruisability  Skin:  No rash, tattoos, scars, edema      PHYSICAL EXAM:   Vital Signs:  Vital Signs Last 24 Hrs  T(C): 36.6 (01 Dec 2022 06:00), Max: 36.6 (2022 12:30)  T(F): 97.8 (01 Dec 2022 06:00), Max: 97.9 (2022 12:30)  HR: 68 (01 Dec 2022 07:23) (54 - 112)  BP: 134/64 (01 Dec 2022 07:00) (89/55 - 185/84)  BP(mean): 85 (01 Dec 2022 07:00) (66 - 110)  RR: 30 (01 Dec 2022 07:00) (0 - 34)  SpO2: 99% (01 Dec 2022 07:23) (79% - 100%)    Parameters below as of 01 Dec 2022 07:21  Patient On (Oxygen Delivery Method): ventilator      Daily     Daily Weight in k.8 (01 Dec 2022 07:20)I&O's Summary    2022 07:01  -  01 Dec 2022 07:00  --------------------------------------------------------  IN: 2075 mL / OUT: 0 mL / NET: 2075 mL        GENERAL:  Appears stated age, well-groomed, well-nourished, no distress  HEENT:  NC/AT,  conjunctivae clear and pink, no thyromegaly, nodules, adenopathy, no JVD, sclera -anicteric  CHEST:  Full & symmetric excursion, no increased effort, breath sounds clear  HEART:  Regular rhythm, S1, S2, no murmur/rub/S3/S4, no abdominal bruit, no edema  ABDOMEN:  Soft, non-tender, non-distended, normoactive bowel sounds,  no masses ,no hepato-splenomegaly, no signs of chronic liver disease  EXTEREMITIES:  no cyanosis,clubbing or edema  SKIN:  No rash/erythema/ecchymoses/petechiae/wounds/abscess/warm/dry  NEURO:  Alert, oriented, no asterixis, no tremor, no encephalopathy      LABS:                        7.3    5.51  )-----------( 147      ( 01 Dec 2022 05:58 )             24.3         141  |  103  |  9   ----------------------------<  115<H>  4.2   |  32<H>  |  1.05    Ca    8.1<L>      01 Dec 2022 05:58    TPro  6.5  /  Alb  1.4<L>  /  TBili  0.4  /  DBili  x   /  AST  26  /  ALT  <10<L>  /  AlkPhos  118          amylase   lipase  RADIOLOGY & ADDITIONAL TESTS:

## 2022-12-01 NOTE — PROGRESS NOTE ADULT - SUBJECTIVE AND OBJECTIVE BOX
Chief Complaint: Respiratory distress    Interval Events: No events overnight.    Review of Systems:  Unable to obtain    Physical Exam:  Vital Signs Last 24 Hrs  T(C): 36.6 (01 Dec 2022 06:00), Max: 36.6 (30 Nov 2022 12:30)  T(F): 97.8 (01 Dec 2022 06:00), Max: 97.9 (30 Nov 2022 12:30)  HR: 58 (01 Dec 2022 11:20) (54 - 112)  BP: 99/47 (01 Dec 2022 08:00) (89/55 - 185/84)  BP(mean): 63 (01 Dec 2022 08:00) (63 - 110)  RR: 22 (01 Dec 2022 08:00) (0 - 34)  SpO2: 100% (01 Dec 2022 11:20) (79% - 100%)  Parameters below as of 01 Dec 2022 08:00  Patient On (Oxygen Delivery Method): ventilator  O2 Concentration (%): 100  General: Unresponsive  HEENT: MMM  Neck: No JVD, no carotid bruit  Lungs: CTAB  CV: RRR, nl S1/S2, no M/R/G  Abdomen: S/NT/ND, +BS  Extremities: 1+ LE edema, no cyanosis  Neuro: AAOx0  Skin: No rash    Labs:    12-01    141  |  103  |  9   ----------------------------<  115<H>  4.2   |  32<H>  |  1.05    Ca    8.1<L>      01 Dec 2022 05:58    TPro  6.5  /  Alb  1.4<L>  /  TBili  0.4  /  DBili  x   /  AST  26  /  ALT  <10<L>  /  AlkPhos  118  12-01                        7.3    5.51  )-----------( 147      ( 01 Dec 2022 05:58 )             24.3       ECG/Telemetry: Sinus rhythm

## 2022-12-01 NOTE — PROGRESS NOTE ADULT - SUBJECTIVE AND OBJECTIVE BOX
[facility], Division of Infectious Diseases  MOIZ Lora Y. Patel, S. Shah, G. [facility]  [phone]    Name: BREA BECKHAM  Age: 77y  Gender: Female  MRN: [id number]    Interval History:  Patient seen and examined at bedside in SPCU  Afebrile overnight  Notes reviewed    Antibiotics:  ampicillin/sulbactam  IVPB 3 Gram(s) IV Intermittent every 6 hours      Medications:  albuterol    90 MICROgram(s) HFA Inhaler 2 Puff(s) Inhalation two times a day  ampicillin/sulbactam  IVPB 3 Gram(s) IV Intermittent every 6 hours  chlorhexidine 2% Cloths 1 Application(s) Topical daily  dexMEDEtomidine Infusion 0.5 MICROgram(s)/kG/Hr IV Continuous <Continuous>  dextrose 5%. 1000 milliLiter(s) IV Continuous <Continuous>  dextrose 5%. 1000 milliLiter(s) IV Continuous <Continuous>  dextrose 50% Injectable 25 Gram(s) IV Push once  dextrose 50% Injectable 12.5 Gram(s) IV Push once  dextrose 50% Injectable 25 Gram(s) IV Push once  dextrose Oral Gel 15 Gram(s) Oral once PRN  glucagon  Injectable 1 milliGRAM(s) IntraMuscular once  heparin   Injectable 5000 Unit(s) SubCutaneous every 12 hours  insulin glargine Injectable (LANTUS) 8 Unit(s) SubCutaneous every morning  insulin lispro (ADMELOG) corrective regimen sliding scale   SubCutaneous every 6 hours  LORazepam     Tablet 1 milliGRAM(s) Oral every 4 hours  LORazepam   Injectable 2 milliGRAM(s) IV Push every 6 hours PRN  midodrine 15 milliGRAM(s) Oral every 8 hours  norepinephrine Infusion 0.05 MICROgram(s)/kG/Min IV Continuous <Continuous>  pantoprazole  Injectable 40 milliGRAM(s) IV Push daily  polyethylene glycol 3350 17 Gram(s) Oral every 12 hours  povidone iodine 10% Solution 1 Application(s) Topical daily  senna 2 Tablet(s) Oral at bedtime  sodium chloride 0.9% lock flush 10 milliLiter(s) IV Push every 1 hour PRN  sodium chloride 0.9%. 1000 milliLiter(s) IV Continuous <Continuous>  sodium zirconium cyclosilicate 10 Gram(s) Oral three times a day      Review of Systems:  unable to obtain    Allergies: codeine (Hives)    For details regarding the patient's past medical history, social history, family history, and other miscellaneous elements, please refer the initial infectious diseases consultation and/or the admitting history and physical examination for this admission.    Objective:  Vital Signs Last 24 Hrs  T(C): 36.6 (01 Dec 2022 06:00), Max: 36.6 (30 Nov 2022 12:30)  T(F): 97.8 (01 Dec 2022 06:00), Max: 97.9 (30 Nov 2022 12:30)  HR: 58 (01 Dec 2022 11:20) (54 - 112)  BP: 99/47 (01 Dec 2022 08:00) (89/55 - 185/84)  BP(mean): 63 (01 Dec 2022 08:00) (63 - 110)  RR: 22 (01 Dec 2022 08:00) (0 - 34)  SpO2: 100% (01 Dec 2022 11:20) (79% - 100%)    Parameters below as of 01 Dec 2022 08:00  Patient On (Oxygen Delivery Method): ventilator    O2 Concentration (%): 100      Physical Examination:  General: no acute distress  HEENT: NC/AT, EOMI  Neck: trach  Cardio: S1, S2 heard, RRR, no murmurs  Resp: MV breath sounds  Abd: distended  Ext: no edema or cyanosis  Skin: warm, dry, no visible rash      Laboratory Studies:                        7.3    5.51  )-----------( 147      ( 01 Dec 2022 05:58 )             24.3   12-01    141  |  103  |  9   ----------------------------<  115<H>  4.2   |  32<H>  |  1.05    Ca    8.1<L>      01 Dec 2022 05:58    TPro  6.5  /  Alb  1.4<L>  /  TBili  0.4  /  DBili  x   /  AST  26  /  ALT  <10<L>  /  AlkPhos  118  12-01        Microbiology: reviewed    Culture - Sputum (collected 11-21-22 @ 20:14)  Source: Trach Asp Tracheal Aspirate  Gram Stain (11-22-22 @ 08:43):    Moderate polymorphonuclear leukocytes per low power field    No Squamous epithelial cells per low power field    No organisms seen  Final Report (11-24-22 @ 17:55):    Moderate Acinetobacter baumannii/nosocom group (Carbapenem Resistant)    Normal Respiratory Elvira present  Organism: Acinetobacter baumannii/nosocom group (Carbapenem Resistant) (11-24-22 @ 17:55)  Organism: Acinetobacter baumannii/nosocom group (Carbapenem Resistant) (11-24-22 @ 17:55)      -  Polymyxin B: I 0.25      Method Type: ETEST  Organism: Acinetobacter baumannii/nosocom group (Carbapenem Resistant) (11-24-22 @ 17:55)      -  Minocycline: R      -  Piperacillin/Tazobactam: R      Method Type: KB  Organism: Acinetobacter baumannii/nosocom group (Carbapenem Resistant) (11-24-22 @ 17:55)      -  Amikacin: S <=16      -  Ampicillin/Sulbactam: S 8/4      -  Cefepime: R >16      -  Ceftazidime: R >16      -  Ciprofloxacin: R >2      -  Gentamicin: I 8      -  Imipenem: R 8      -  Levofloxacin: R >4      -  Meropenem: R >8      -  Tobramycin: R >8      -  Trimethoprim/Sulfamethoxazole: R >2/38      Method Type: PRATIK    Culture - Urine (collected 11-21-22 @ 04:25)  Source: Clean Catch Clean Catch (Midstream)  Final Report (11-22-22 @ 09:39):    <10,000 CFU/mL Normal Urogenital Elvira    Culture - Blood (collected 11-21-22 @ 03:56)  Source: .Blood Blood-Peripheral  Final Report (11-26-22 @ 14:01):    No Growth Final    Culture - Blood (collected 11-21-22 @ 03:56)  Source: .Blood Blood-Peripheral  Final Report (11-26-22 @ 14:00):    No Growth Final          Radiology: reviewed

## 2022-12-01 NOTE — PATIENT PROFILE ADULT - FALL HARM RISK - HARM RISK INTERVENTIONS

## 2022-12-02 LAB
ALBUMIN SERPL ELPH-MCNC: 1.8 G/DL — LOW (ref 3.3–5)
ALP SERPL-CCNC: 168 U/L — HIGH (ref 30–120)
ALT FLD-CCNC: <10 U/L DA — LOW (ref 10–60)
ANION GAP SERPL CALC-SCNC: 7 MMOL/L — SIGNIFICANT CHANGE UP (ref 5–17)
AST SERPL-CCNC: 20 U/L — SIGNIFICANT CHANGE UP (ref 10–40)
BILIRUB SERPL-MCNC: 0.4 MG/DL — SIGNIFICANT CHANGE UP (ref 0.2–1.2)
BUN SERPL-MCNC: 24 MG/DL — HIGH (ref 7–23)
CALCIUM SERPL-MCNC: 8.4 MG/DL — SIGNIFICANT CHANGE UP (ref 8.4–10.5)
CHLORIDE SERPL-SCNC: 102 MMOL/L — SIGNIFICANT CHANGE UP (ref 96–108)
CO2 SERPL-SCNC: 31 MMOL/L — SIGNIFICANT CHANGE UP (ref 22–31)
CREAT SERPL-MCNC: 1.08 MG/DL — SIGNIFICANT CHANGE UP (ref 0.5–1.3)
EGFR: 52 ML/MIN/1.73M2 — LOW
GLUCOSE SERPL-MCNC: 169 MG/DL — HIGH (ref 70–99)
HCT VFR BLD CALC: 30.7 % — LOW (ref 34.5–45)
HGB BLD-MCNC: 9.1 G/DL — LOW (ref 11.5–15.5)
MCHC RBC-ENTMCNC: 27.5 PG — SIGNIFICANT CHANGE UP (ref 27–34)
MCHC RBC-ENTMCNC: 29.6 GM/DL — LOW (ref 32–36)
MCV RBC AUTO: 92.7 FL — SIGNIFICANT CHANGE UP (ref 80–100)
NRBC # BLD: 0 /100 WBCS — SIGNIFICANT CHANGE UP (ref 0–0)
PLATELET # BLD AUTO: 214 K/UL — SIGNIFICANT CHANGE UP (ref 150–400)
POTASSIUM SERPL-MCNC: 4.2 MMOL/L — SIGNIFICANT CHANGE UP (ref 3.5–5.3)
POTASSIUM SERPL-SCNC: 4.2 MMOL/L — SIGNIFICANT CHANGE UP (ref 3.5–5.3)
PROT SERPL-MCNC: 7.6 G/DL — SIGNIFICANT CHANGE UP (ref 6–8.3)
RBC # BLD: 3.31 M/UL — LOW (ref 3.8–5.2)
RBC # FLD: 16.1 % — HIGH (ref 10.3–14.5)
SODIUM SERPL-SCNC: 140 MMOL/L — SIGNIFICANT CHANGE UP (ref 135–145)
WBC # BLD: 8.74 K/UL — SIGNIFICANT CHANGE UP (ref 3.8–10.5)
WBC # FLD AUTO: 8.74 K/UL — SIGNIFICANT CHANGE UP (ref 3.8–10.5)

## 2022-12-02 PROCEDURE — 99232 SBSQ HOSP IP/OBS MODERATE 35: CPT

## 2022-12-02 RX ADMIN — ALBUTEROL 2 PUFF(S): 90 AEROSOL, METERED ORAL at 20:28

## 2022-12-02 RX ADMIN — MIDODRINE HYDROCHLORIDE 15 MILLIGRAM(S): 2.5 TABLET ORAL at 05:36

## 2022-12-02 RX ADMIN — Medication 2: at 06:25

## 2022-12-02 RX ADMIN — Medication 2: at 11:05

## 2022-12-02 RX ADMIN — POLYETHYLENE GLYCOL 3350 17 GRAM(S): 17 POWDER, FOR SOLUTION ORAL at 05:37

## 2022-12-02 RX ADMIN — Medication 1 MILLIGRAM(S): at 17:42

## 2022-12-02 RX ADMIN — AMPICILLIN SODIUM AND SULBACTAM SODIUM 200 GRAM(S): 250; 125 INJECTION, POWDER, FOR SUSPENSION INTRAMUSCULAR; INTRAVENOUS at 00:15

## 2022-12-02 RX ADMIN — Medication 2 MILLIGRAM(S): at 19:43

## 2022-12-02 RX ADMIN — Medication 1 MILLIGRAM(S): at 02:19

## 2022-12-02 RX ADMIN — ALBUTEROL 2 PUFF(S): 90 AEROSOL, METERED ORAL at 06:34

## 2022-12-02 RX ADMIN — Medication 1 MILLIGRAM(S): at 10:59

## 2022-12-02 RX ADMIN — POLYETHYLENE GLYCOL 3350 17 GRAM(S): 17 POWDER, FOR SOLUTION ORAL at 17:42

## 2022-12-02 RX ADMIN — Medication 2 MILLIGRAM(S): at 09:52

## 2022-12-02 RX ADMIN — MIDODRINE HYDROCHLORIDE 15 MILLIGRAM(S): 2.5 TABLET ORAL at 21:39

## 2022-12-02 RX ADMIN — MIDODRINE HYDROCHLORIDE 15 MILLIGRAM(S): 2.5 TABLET ORAL at 14:25

## 2022-12-02 RX ADMIN — INSULIN GLARGINE 8 UNIT(S): 100 INJECTION, SOLUTION SUBCUTANEOUS at 09:17

## 2022-12-02 RX ADMIN — Medication 1 MILLIGRAM(S): at 21:39

## 2022-12-02 RX ADMIN — SENNA PLUS 2 TABLET(S): 8.6 TABLET ORAL at 21:38

## 2022-12-02 RX ADMIN — Medication 1 MILLIGRAM(S): at 05:37

## 2022-12-02 RX ADMIN — CHLORHEXIDINE GLUCONATE 1 APPLICATION(S): 213 SOLUTION TOPICAL at 05:38

## 2022-12-02 RX ADMIN — PANTOPRAZOLE SODIUM 40 MILLIGRAM(S): 20 TABLET, DELAYED RELEASE ORAL at 11:03

## 2022-12-02 RX ADMIN — HEPARIN SODIUM 5000 UNIT(S): 5000 INJECTION INTRAVENOUS; SUBCUTANEOUS at 17:42

## 2022-12-02 RX ADMIN — Medication 1 MILLIGRAM(S): at 14:24

## 2022-12-02 RX ADMIN — Medication 1 APPLICATION(S): at 11:04

## 2022-12-02 RX ADMIN — HEPARIN SODIUM 5000 UNIT(S): 5000 INJECTION INTRAVENOUS; SUBCUTANEOUS at 05:36

## 2022-12-02 NOTE — PROGRESS NOTE ADULT - SUBJECTIVE AND OBJECTIVE BOX
INTERVAL HPI/OVERNIGHT EVENTS:  No new overnight event.  No N/V/D.  Tolerating diet some leaking  Allergies    codeine (Hives)    Intolerances      General:  No wt loss, fevers, chills, night sweats, fatigue,   Eyes:  Good vision, no reported pain  ENT:  No sore throat, pain, runny nose, dysphagia  CV:  No pain, palpitations, hypo/hypertension  Resp:  No dyspnea, cough, tachypnea, wheezing  GI:  No pain, No nausea, No vomiting, No diarrhea, No constipation, No weight loss, No fever, No pruritis, No rectal bleeding, No tarry stools, No dysphagia,  :  No pain, bleeding, incontinence, nocturia  Muscle:  No pain, weakness  Neuro:  No weakness, tingling, memory problems  Psych:  No fatigue, insomnia, mood problems, depression  Endocrine:  No polyuria, polydipsia, cold/heat intolerance  Heme:  No petechiae, ecchymosis, easy bruisability  Skin:  No rash, tattoos, scars, edema      PHYSICAL EXAM:   Vital Signs:  Vital Signs Last 24 Hrs  T(C): 37.4 (02 Dec 2022 12:09), Max: 37.4 (02 Dec 2022 12:09)  T(F): 99.4 (02 Dec 2022 12:09), Max: 99.4 (02 Dec 2022 12:09)  HR: 68 (02 Dec 2022 12:00) (54 - 104)  BP: 127/61 (02 Dec 2022 12:00) (103/55 - 154/81)  BP(mean): 81 (02 Dec 2022 12:00) (71 - 104)  RR: 14 (02 Dec 2022 12:00) (14 - 35)  SpO2: 93% (02 Dec 2022 12:00) (93% - 100%)    Parameters below as of 02 Dec 2022 12:00  Patient On (Oxygen Delivery Method): ventilator      Daily     Daily Weight in k.5 (02 Dec 2022 06:15)I&O's Summary    01 Dec 2022 07:01  -  02 Dec 2022 07:00  --------------------------------------------------------  IN: 1450 mL / OUT: 300 mL / NET: 1150 mL        GENERAL:  Appears stated age, well-groomed, well-nourished, no distress  HEENT:  NC/AT,  conjunctivae clear and pink, no thyromegaly, nodules, adenopathy, no JVD, sclera -anicteric  CHEST:  Full & symmetric excursion, no increased effort, breath sounds clear  HEART:  Regular rhythm, S1, S2, no murmur/rub/S3/S4, no abdominal bruit, no edema  ABDOMEN:  Soft, non-tender, non-distended, normoactive bowel sounds,  no masses ,no hepato-splenomegaly, no signs of chronic liver disease  EXTEREMITIES:  no cyanosis,clubbing or edema  SKIN:  No rash/erythema/ecchymoses/petechiae/wounds/abscess/warm/dry  NEURO:  Alert, oriented, no asterixis, no tremor, no encephalopathy      LABS:                        9.1    8.74  )-----------( 214      ( 02 Dec 2022 07:25 )             30.7     12-    140  |  102  |  24<H>  ----------------------------<  169<H>  4.2   |  31  |  1.08    Ca    8.4      02 Dec 2022 07:25    TPro  7.6  /  Alb  1.8<L>  /  TBili  0.4  /  DBili  x   /  AST  20  /  ALT  <10<L>  /  AlkPhos  168<H>  12        amylase   lipase  RADIOLOGY & ADDITIONAL TESTS:

## 2022-12-02 NOTE — PROGRESS NOTE ADULT - ASSESSMENT
77F with dementia, COPD with chronic respiratory failure s/p trach, cardiac arrest, CHH, quadriplegia, CVA, CKD, anemia, PEG tube, and multiple hospital admissions for respiratory distress presents to the ED BIBEMS from [facility] for diaphoresis and fever.         ativan  s/p IVF  vent support    GOC   pt is full code   is full code  assist with needs -   oral hygiene  skin care  recurrent admissions  long term resident of SNF  vent dep  anoxic brain injury - dementia - vegetative state

## 2022-12-02 NOTE — PROGRESS NOTE ADULT - ASSESSMENT
77F with dementia, COPD with chronic respiratory failure s/p trach, cardiac arrest, CHH, quadriplegia, CVA, CKD, anemia, PEG tube, and multiple hospital admissions for respiratory distress presents to the ED BIBEMS from [facility] for diaphoresis and fever.     Readmitted with sepsis with shock  2/2 recurent VAP.    Sputum cx 11/21 with Acinetobacter baumannii/nosocom group (Carbapenem Resistant) resistant to Bactrim    Plan:  S/p unasyn x7 day course completed 12/1  Trend temps and cbc  Asp precautions  Aggressive pulm toileting  Isolation per infection control policy  Vent management per ICU  on midodrine, pressors prn    Poor prognosis  Continued GOC discussion encouraged    Infectious Diseases will continue to follow. Please call with any questions.   Andressa Brantley M.D.  [facility] Division of Infectious Diseases [phone]

## 2022-12-02 NOTE — PROGRESS NOTE ADULT - SUBJECTIVE AND OBJECTIVE BOX
[facility], Division of Infectious Diseases  MOIZ Lora Y. Patel, S. Shah, G. [facility]  [phone]    Name: BREA BECKHAM  Age: 77y  Gender: Female  MRN: [id number]    Interval History:  Patient seen and examined at bedside this morning in SPCU  Afebrile  Notes reviewed    Antibiotics:      Medications:  albuterol    90 MICROgram(s) HFA Inhaler 2 Puff(s) Inhalation two times a day  chlorhexidine 2% Cloths 1 Application(s) Topical daily  dextrose 5%. 1000 milliLiter(s) IV Continuous <Continuous>  dextrose 5%. 1000 milliLiter(s) IV Continuous <Continuous>  dextrose 50% Injectable 25 Gram(s) IV Push once  dextrose 50% Injectable 12.5 Gram(s) IV Push once  dextrose 50% Injectable 25 Gram(s) IV Push once  dextrose Oral Gel 15 Gram(s) Oral once PRN  glucagon  Injectable 1 milliGRAM(s) IntraMuscular once  heparin   Injectable 5000 Unit(s) SubCutaneous every 12 hours  insulin glargine Injectable (LANTUS) 8 Unit(s) SubCutaneous every morning  insulin lispro (ADMELOG) corrective regimen sliding scale   SubCutaneous every 6 hours  LORazepam     Tablet 1 milliGRAM(s) Oral every 4 hours  LORazepam   Injectable 2 milliGRAM(s) IV Push every 6 hours PRN  midodrine 15 milliGRAM(s) Oral every 8 hours  norepinephrine Infusion 0.05 MICROgram(s)/kG/Min IV Continuous <Continuous>  pantoprazole  Injectable 40 milliGRAM(s) IV Push daily  polyethylene glycol 3350 17 Gram(s) Oral every 12 hours  povidone iodine 10% Solution 1 Application(s) Topical daily  senna 2 Tablet(s) Oral at bedtime  sodium chloride 0.9% lock flush 10 milliLiter(s) IV Push every 1 hour PRN  sodium chloride 0.9%. 1000 milliLiter(s) IV Continuous <Continuous>      Review of Systems:  unable to obtain    Allergies: codeine (Hives)    For details regarding the patient's past medical history, social history, family history, and other miscellaneous elements, please refer the initial infectious diseases consultation and/or the admitting history and physical examination for this admission.    Objective:  Vitals:   T(C): 37.2 (12-02-22 @ 08:58), Max: 37.2 (12-02-22 @ 08:58)  HR: 77 (12-02-22 @ 06:44) (54 - 104)  BP: 154/81 (12-02-22 @ 06:15) (103/55 - 154/81)  RR: 28 (12-02-22 @ 06:15) (17 - 40)  SpO2: 100% (12-02-22 @ 06:44) (93% - 100%)    Mode: AC/ CMV (Assist Control/ Continuous Mandatory Ventilation)  RR (machine): 18  TV (machine): 350  FiO2: 100  PEEP: 8  ITime: 1  MAP: 17  PIP: 41      Physical Examination:  General: vented pt  HEENT: NC/AT  Neck: trach  Cardio: S1, S2 heard, RRR, no murmurs  Resp: MV breath sounds  Abd: distended  Ext: no edema or cyanosis  Skin: warm, dry, no visible rash      Laboratory Studies:  CBC:                       9.1    8.74  )-----------( 214      ( 02 Dec 2022 07:25 )             30.7     CMP: 12-02    140  |  102  |  24<H>  ----------------------------<  169<H>  4.2   |  31  |  1.08    Ca    8.4      02 Dec 2022 07:25    TPro  7.6  /  Alb  1.8<L>  /  TBili  0.4  /  DBili  x   /  AST  20  /  ALT  <10<L>  /  AlkPhos  168<H>  12-02    LIVER FUNCTIONS - ( 02 Dec 2022 07:25 )  Alb: 1.8 g/dL / Pro: 7.6 g/dL / ALK PHOS: 168 U/L / ALT: <10 U/L DA / AST: 20 U/L / GGT: x               Microbiology: reviewed    Culture - Sputum (collected 11-21-22 @ 20:14)  Source: Trach Asp Tracheal Aspirate  Gram Stain (11-22-22 @ 08:43):    Moderate polymorphonuclear leukocytes per low power field    No Squamous epithelial cells per low power field    No organisms seen  Final Report (11-24-22 @ 17:55):    Moderate Acinetobacter baumannii/nosocom group (Carbapenem Resistant)    Normal Respiratory Elvira present  Organism: Acinetobacter baumannii/nosocom group (Carbapenem Resistant) (11-24-22 @ 17:55)  Organism: Acinetobacter baumannii/nosocom group (Carbapenem Resistant) (11-24-22 @ 17:55)      -  Polymyxin B: I 0.25      Method Type: ETEST  Organism: Acinetobacter baumannii/nosocom group (Carbapenem Resistant) (11-24-22 @ 17:55)      -  Minocycline: R      -  Piperacillin/Tazobactam: R      Method Type: KB  Organism: Acinetobacter baumannii/nosocom group (Carbapenem Resistant) (11-24-22 @ 17:55)      -  Amikacin: S <=16      -  Ampicillin/Sulbactam: S 8/4      -  Cefepime: R >16      -  Ceftazidime: R >16      -  Ciprofloxacin: R >2      -  Gentamicin: I 8      -  Imipenem: R 8      -  Levofloxacin: R >4      -  Meropenem: R >8      -  Tobramycin: R >8      -  Trimethoprim/Sulfamethoxazole: R >2/38      Method Type: PRATIK    Culture - Urine (collected 11-21-22 @ 04:25)  Source: Clean Catch Clean Catch (Midstream)  Final Report (11-22-22 @ 09:39):    <10,000 CFU/mL Normal Urogenital Elvira    Culture - Blood (collected 11-21-22 @ 03:56)  Source: .Blood Blood-Peripheral  Final Report (11-26-22 @ 14:01):    No Growth Final    Culture - Blood (collected 11-21-22 @ 03:56)  Source: .Blood Blood-Peripheral  Final Report (11-26-22 @ 14:00):    No Growth Final          Radiology: reviewed

## 2022-12-02 NOTE — PROGRESS NOTE ADULT - SUBJECTIVE AND OBJECTIVE BOX
BREA BECKHAM     [facility] 03    Allergies    codeine (Hives)    Intolerances        PAST MEDICAL & SURGICAL HISTORY:  Dementia of frontal lobe type      Aphasic stroke      Diabetes mellitus      Respiratory failure      Hypertension      GERD (gastroesophageal reflux disease)      Constipation      Respiratory failure      CVA (cerebral vascular accident)      HTN (hypertension)      DM (diabetes mellitus)      Advanced dementia      COVID-19 virus detected      Quadriplegia      Pneumonia      Type II diabetes mellitus      Hx of appendectomy      Gastrostomy in place      Tracheostomy in place      Tracheostomy tube present      Feeding by G-tube          FAMILY HISTORY:  No pertinent family history in first degree relatives        Home Medications:  Admelog 100 units/mL injectable solution: injectable every 6 hours SS (21 Nov 2022 05:08)  albuterol 90 mcg/inh inhalation aerosol with adapter: 2  inhaled every 6 hours (21 Nov 2022 04:24)  Bacid (LAC) oral tablet: 2 tab(s) by gastrostomy tube once a day (21 Nov 2022 04:24)  bacitracin 500 units/g topical ointment: Apply topically to affected area once a day to knees (21 Nov 2022 04:24)  chlorhexidine 0.12% mucous membrane liquid: 15 milliliter(s) mucous membrane 2 times a day (21 Nov 2022 04:24)  Dakins Full Strength 0.5% topical solution: Apply topically to affected area 2 times a day then apply santyl and calcium alginate (21 Nov 2022 04:24)  Eucerin topical cream: Apply topically to affected area once a day bilateral feet (21 Nov 2022 04:24)  ferrous sulfate 325 mg (65 mg elemental iron) oral tablet: 1 tab(s) by gastrostomy tube once a day (21 Nov 2022 04:24)  insulin glargine 100 units/mL subcutaneous solution: 8 unit(s) subcutaneous once a day (in the morning) (21 Nov 2022 04:24)  ipratropium-albuterol 0.5 mg-2.5 mg/3 mL inhalation solution: 3 milliliter(s) inhaled every 6 hours (21 Nov 2022 04:24)  LORazepam 1 mg oral tablet: 1 tab(s) by gastrostomy tube every 4 hours (21 Nov 2022 04:24)  midodrine 10 mg oral tablet: 1 tab(s) by gastrostomy tube every 8 hours (21 Nov 2022 04:24)  MiraLax oral powder for reconstitution: orally once a day (at bedtime) (21 Nov 2022 05:08)  mulivitamin: by gastrostomy tube once a day (21 Nov 2022 04:24)  nystatin 100,000 units/g topical powder: 1 application topically 3 times a day (21 Nov 2022 04:24)  omeprazole 40 mg oral delayed release capsule: 1 cap(s) by gastrostomy tube 2 times a day (21 Nov 2022 04:24)  polyethylene glycol 3350 oral powder for reconstitution: 17 gram(s) by gastrostomy tube every 12 hours (21 Nov 2022 04:24)  senna 8.6 mg oral tablet: 3 tab(s) by gastrostomy tube once a day (at bedtime) (21 Nov 2022 04:24)  simethicone 80 mg oral tablet, chewable: 1 tab(s) by gastrostomy tube every 6 hours (21 Nov 2022 04:24)  sucralfate 1 g/10 mL oral suspension: 10 milliliter(s) g-tube 4 times a day (before meals and at bedtime) (21 Nov 2022 04:24)  Tylenol 325 mg oral tablet: 2 tab(s) orally every 6 hours, As Needed prior to dressing change (21 Nov 2022 04:24)      MEDICATIONS  (STANDING):  albuterol    90 MICROgram(s) HFA Inhaler 2 Puff(s) Inhalation two times a day  chlorhexidine 2% Cloths 1 Application(s) Topical daily  dextrose 5%. 1000 milliLiter(s) (50 mL/Hr) IV Continuous <Continuous>  dextrose 5%. 1000 milliLiter(s) (100 mL/Hr) IV Continuous <Continuous>  dextrose 50% Injectable 25 Gram(s) IV Push once  dextrose 50% Injectable 12.5 Gram(s) IV Push once  dextrose 50% Injectable 25 Gram(s) IV Push once  glucagon  Injectable 1 milliGRAM(s) IntraMuscular once  heparin   Injectable 5000 Unit(s) SubCutaneous every 12 hours  insulin glargine Injectable (LANTUS) 8 Unit(s) SubCutaneous every morning  insulin lispro (ADMELOG) corrective regimen sliding scale   SubCutaneous every 6 hours  LORazepam     Tablet 1 milliGRAM(s) Oral every 4 hours  midodrine 15 milliGRAM(s) Oral every 8 hours  norepinephrine Infusion 0.05 MICROgram(s)/kG/Min (5.35 mL/Hr) IV Continuous <Continuous>  pantoprazole  Injectable 40 milliGRAM(s) IV Push daily  polyethylene glycol 3350 17 Gram(s) Oral every 12 hours  povidone iodine 10% Solution 1 Application(s) Topical daily  senna 2 Tablet(s) Oral at bedtime  sodium chloride 0.9%. 1000 milliLiter(s) (100 mL/Hr) IV Continuous <Continuous>    MEDICATIONS  (PRN):  dextrose Oral Gel 15 Gram(s) Oral once PRN Blood Glucose LESS THAN 70 milliGRAM(s)/deciliter  LORazepam   Injectable 2 milliGRAM(s) IV Push every 6 hours PRN Agitation  sodium chloride 0.9% lock flush 10 milliLiter(s) IV Push every 1 hour PRN Pre/post blood products, medications, blood draw, and to maintain line patency      Diet, NPO with Tube Feed:   Tube Feeding Modality: Gastrostomy  Glucerna 1.5 Horacio  Total Volume for 24 Hours (mL): 1000  Continuous  Starting Tube Feed Rate mL per Hour: 10  Increase Tube Feed Rate by (mL): 10     Every 10 hours  Until Goal Tube Feed Rate (mL per Hour): 50  Tube Feed Duration (in Hours): 20  Tube Feed Start Time: 17:00  Free Water Flush  Pump   Rate (mL per Hour): 25   Frequency: Every Hour    Duration (Hours): 24 (11-21-22 @ 05:10) [Active]          Vital Signs Last 24 Hrs  T(C): 36.6 (02 Dec 2022 04:00), Max: 36.6 (02 Dec 2022 04:00)  T(F): 97.8 (02 Dec 2022 04:00), Max: 97.8 (02 Dec 2022 04:00)  HR: 77 (02 Dec 2022 06:44) (54 - 104)  BP: 154/81 (02 Dec 2022 06:15) (103/55 - 154/81)  BP(mean): 104 (02 Dec 2022 06:15) (71 - 104)  RR: 28 (02 Dec 2022 06:15) (17 - 40)  SpO2: 100% (02 Dec 2022 06:44) (93% - 100%)    Parameters below as of 02 Dec 2022 06:44  Patient On (Oxygen Delivery Method): ventilator,100          12-01-22 @ 07:01  -  12-02-22 @ 07:00  --------------------------------------------------------  IN: 1450 mL / OUT: 300 mL / NET: 1150 mL        Mode: AC/ CMV (Assist Control/ Continuous Mandatory Ventilation), RR (machine): 18, TV (machine): 350, FiO2: 100, PEEP: 8, ITime: 1, MAP: 17, PIP: 41      LABS:                        9.1    8.74  )-----------( 214      ( 02 Dec 2022 07:25 )             30.7     12-01    141  |  103  |  9   ----------------------------<  115<H>  4.2   |  32<H>  |  1.05    Ca    8.1<L>      01 Dec 2022 05:58    TPro  6.5  /  Alb  1.4<L>  /  TBili  0.4  /  DBili  x   /  AST  26  /  ALT  <10<L>  /  AlkPhos  118  12-01              WBC:  WBC Count: 8.74 K/uL (12-02 @ 07:25)  WBC Count: 5.85 K/uL (12-01 @ 12:00)  WBC Count: 5.51 K/uL (12-01 @ 05:58)  WBC Count: 12.90 K/uL (11-30 @ 05:55)  WBC Count: 9.14 K/uL (11-29 @ 06:51)      MICROBIOLOGY:  RECENT CULTURES:                  Sodium:  Sodium, Serum: 141 mmol/L (12-01 @ 05:58)  Sodium, Serum: 139 mmol/L (11-30 @ 05:55)  Sodium, Serum: 139 mmol/L (11-29 @ 06:51)      1.05 mg/dL 12-01 @ 05:58  1.14 mg/dL 11-30 @ 05:55  1.18 mg/dL 11-29 @ 06:51      Hemoglobin:  Hemoglobin: 9.1 g/dL (12-02 @ 07:25)  Hemoglobin: 7.8 g/dL (12-01 @ 12:00)  Hemoglobin: 7.3 g/dL (12-01 @ 05:58)  Hemoglobin: 9.2 g/dL (11-30 @ 05:55)  Hemoglobin: 8.1 g/dL (11-29 @ 06:51)      Platelets: Platelet Count - Automated: 214 K/uL (12-02 @ 07:25)  Platelet Count - Automated: 157 K/uL (12-01 @ 12:00)  Platelet Count - Automated: 147 K/uL (12-01 @ 05:58)  Platelet Count - Automated: 175 K/uL (11-30 @ 05:55)  Platelet Count - Automated: 152 K/uL (11-29 @ 06:51)      LIVER FUNCTIONS - ( 01 Dec 2022 05:58 )  Alb: 1.4 g/dL / Pro: 6.5 g/dL / ALK PHOS: 118 U/L / ALT: <10 U/L DA / AST: 26 U/L / GGT: x                 RADIOLOGY & ADDITIONAL STUDIES:      MICROBIOLOGY:  RECENT CULTURES:

## 2022-12-02 NOTE — PROGRESS NOTE ADULT - ASSESSMENT
REVIEW OF SYMPTOMS      Able to give (reliable) ROS  NO     PHYSICAL EXAM    HEENT Unremarkable  atraumatic   RESP Fair air entry EXP prolonged    Harsh breath sound Resp distres mild   CARDIAC S1 S2 No S3     NO JVD    ABDOMEN SOFT BS PRESENT NOT DISTENDED No hepatosplenomegaly   PEDAL EDEMA present No calf tenderness  NO rash       GENERAL DATA .   GOC.  full code      ALLGY.     codeine                        WT. 11/21/2022 57  BMI.    11/21/2022 21                          ICU STAY.   admitted ICU 11/21/2022  COVID.  Scv2 11/21/2022 scv2 (-)    PROCEDURE.  11/21/2022 [facility] central line    BEST PRACTICE ISSUES.    HOB ELEVATN. Yes  DVT PPLX.  11/21/2022 hpsc    SQUIRES PPLX.    11/21/2022 protonix 40   INFN PPLX.    11/21/2022 chlorhexidine .12% bid (mrsa)   11/21/2022 chlorhexidine 2% (c li)   SP SW EM.         DIET.  11/21/2022 glucerna 1.5 1000 gt      VS/ PO/IO/ VENT/ DRIPS.  12/2/2022 afeb 100 180/80   12/2/2022 ac 18/350/8/100%     CURRENT ADMISSION  Pt sent back from [facility] 11/21/2022 with fever abn labs Found yto be in shock Norepi started 11/21/2022   Pulm crit care consulted      RECENT ADMISSIONS.  11/12-11/16/2022 11/4-11/10/2022  11/5 stenotrophomonas  uc 11/4 vr enterococc 50-99 candida   11/4/2022 levaquin 750 dced 11/7 doxy  11/8 bactrim 250.3  10/18-11/2/2022 11/21 ADMISSION PROBLEMS.  Vent dependent   .. Trach poa 11/21/2022  .. ac 18/350/8/100   RO VTE   .. 11/26 v duplx (-)   RIJ 11/21/2022   INFECTN   .. Decub ulcers   .. VAP 11/21/2022  ...... trach 11/21 mod acinetobacter carbapenem resist   ...... Bactrim tid  11/21-11/25/2022  ...... 11/25/2022 Unasyn 3.4 x 7d Dr Chairez    COPD  Shock   .. Norepi 11/21/2022-> 11/21-11/27  .. midodrine 11/21/2022   peg poa 11/21/2022  ANEMIA   .. Hb 11/25/2022 Hb 7.5  Hyponatremia  .. Na 11/24-11/26/2022 Na 130 - 135  Hyperkalemia .  .. k 11/26/2022 k 5.7       ASSESSMENT/RECOMMENDATIONS .   RESP.  .. Gas exchange.  11/29/2022 ac 18/350/8/80 747/45/64   11/21/2022 4a On 100% vent 759/36/273   Monitor & target po 90-95%  .. VENT MANAGEMENT.   HOB elevation  Target Pplat 30 (-)  Target PO 90-95%  Target pH 730 (+)  Daily spontaneous breathing trials   Daily sedation vacation   .. Pleural effsn .   CXR 11/21/2022 r gr than l effsn  Effusion has been tapped during prev admissions and is lp exudate If fever or worsening sepsis will plan thorac  RO VTE.  11/26 v duplx (-)   11/26/2022 check cta ch   11/29/2022 attempts made to trasport pat several times last few d but pt becomes unstable when tried to move to ct per staff   .. Bronchospasm.  11/21/2022 albut hfa 2p bid   INFECTION.  .. SCV2 status.  Scv2 11/21/2022 scv2 (-)  .. VAP   w 11/21-11/23-11/24-11/25-11/26-11/27-11/28-11/29-11/30-12/1/2022   w 16 - 8.8 - 12- 9 - 9.4- 9.1 - 11  - 9.1 - 12.9 - 5.8   Pr 11/21-11/22/2022 pr 1.8 - 1.1  ua 11/21/2022 w 3-5   cxr 11/21 mod to large r effsn somewhat incr from 11/12 central infiltrates possibly congestive rij   rvp 11/21/2022 (-)   rsv 11/21/2022 (-)   uc 11/21 (-)   legn 11/21 (-)   trach 11/21 mod acinetobacter carbapenem resist   mrsa 11/21 (-)   bc 11/21 (-)   11/21/2022 bactrim 285 mg trimeth q 8 h Dr BRITTANY Brantley DCED 11/25/2022 11/25/2022 Unasyn 3.4 x 7d Dr Chairez    CARDIAC.  .. Shock.    11/21/2022 midodrine 10.3 -> 11/22 midodr 15.3   11/21/2022 5a norepi 8 in 250   target map 65 (+)   .. ekg changes.  ekg 11/21/2022 s tach shoirt pr qtc 470 possible rvh   tr 11/22/2022 tr 28   .. CHF  bnp 11/30/2022 [id number]  11/30/2022 checkj echo   GI.  .. Nutrition.   11/21/2022 glucerna 1.5 1000 gt    HEMAT.  .. DVT pplx.   11/21/2022 hpsc    .. anemia.  Hb 11/21-11/22-11/23-11/24-11/25-11/26-11/28-11/29-11/30/-12/1-12/2/2022   Hb 9.6- 7.8 - 7.7 - 7.7- 7.5 - 7.5  - 8.7 - 8.1 - 9.2 - 7.8 - 9.1   mcv 11/21/2022 mcv 90   inr 11/21/2022 inr 122   monitor target Hb 7 (+)   RENAL.  .. Hyperkalemia .  k 11/26-11/27-11/29/2022 k 5.7 - 6 - 4.2    11/26/2022 lokelma dose   11/27/2022 lokelma 10.3x2d   ENDOCRINE.   .. DM   11/21/2022 glarg 8 q am   11/21/2022 riss   NEURO.  .. seizures.  11/21/2022 lorazepam 1.6     TIME SPENT   Over 39 minutes aggregate critical care time spent on encounter; activities included   direct patient care, counseling and/or coordinating care reviewing notes, lab data/ imaging , discussion with multidisciplinary team/ patient  /family and explaining in detail risks, benefits, alternatives  of the recommendations     CHAPINCITO GUIDRY 76 f [facility] S 11/21/2022   DR JOSEPH DU

## 2022-12-02 NOTE — PROGRESS NOTE ADULT - ASSESSMENT
77F with dementia, COPD with chronic respiratory failure s/p trach, cardiac arrest, CHH, quadriplegia, CVA, CKD, anemia, PEG tube, and multiple hospital admissions for respiratory distress presents to the ED BIBEMS from [facility] for diaphoresis and fever.         COPD with chronic respiratory failure s/p trach  unable to wean off 100% O2  poor prognosis  GOC discussion needs to be revisited  pulmonary recs appreciated    ANemia  multifacotrial/ AICD   s/p 1 unit prbc since admission   H and H stable   continue supportive care    Sepsis 2/2 VAP - meets sepsis based on fever + tachycardia + source of infection.  Lactate 1.9  peristent hypotension  Completed unasyn 12/1/22  - cont with vent settings to maintain SaO2 >92%  - cont with midodrine 15mg TID, pressors PRN  - on ativan for agitation  - MRSA neg, trach culture +acinetobacter   wean as tolerating   Trend temps and cbc    Hyperkalemia  resolved  s/p lokelma     Hyponatremia  - Na of 130 today, likely hypovolemic  - urine lytes ordered  - continue to trend    Leukocytosis  - WBC WNL  - no fevers  comleted unasyn  - continue to trend     DM2 on insulin  - cont with lantus and insulin coverage scale with FS q6h while on TF    COPD   - cont with neb treatments    Anemia of chronic disease   - GI eval - conservative management   - cont with ferrous sulfate  - trend CBC    Prognosis grave, patient remains critically ill and is at high risk for abrupt decompensation and death

## 2022-12-02 NOTE — PROGRESS NOTE ADULT - ASSESSMENT
The patient is a 77 year old female with a history of HTN, DM, CVA, dementia, chronic respiratory failure s/p trach, PEG, cardiac arrest, anemia, pleural effusions who presents with fever and respiratory distress.    Plan:  - Repeat echo with normal LV systolic function and worsened pulmonary pressures, now severe pulm HTN  - CXR with diffuse lung infiltrates  - Continue midodrine 15 mg tid  - Low oncotic pressure leading to third spacing - diuretics likely to have minimal long term effect but can attempt if needed with worsening hypoxia  - Severe pulm HTN - not a candidate for advanced pulmonary therapies  - Pulm and ID follow-up  - Comfort care would be most appropriate

## 2022-12-02 NOTE — PROGRESS NOTE ADULT - SUBJECTIVE AND OBJECTIVE BOX
Date/Time Patient Seen:  		  Referring MD:   Data Reviewed	       Patient is a 77y old  Female who presents with a chief complaint of diaphoresis and fever (01 Dec 2022 13:46)      Subjective/HPI     PAST MEDICAL & SURGICAL HISTORY:  Dementia of frontal lobe type    Aphasic stroke    Diabetes mellitus    Respiratory failure    Hypertension    GERD (gastroesophageal reflux disease)    Constipation    Respiratory failure    CVA (cerebral vascular accident)    HTN (hypertension)    DM (diabetes mellitus)    Advanced dementia    COVID-19 virus detected    Quadriplegia    Pneumonia    Type II diabetes mellitus    Hx of appendectomy    Gastrostomy in place    Tracheostomy in place    Tracheostomy tube present    Feeding by G-tube          Medication list         MEDICATIONS  (STANDING):  albuterol    90 MICROgram(s) HFA Inhaler 2 Puff(s) Inhalation two times a day  chlorhexidine 2% Cloths 1 Application(s) Topical daily  dextrose 5%. 1000 milliLiter(s) (100 mL/Hr) IV Continuous <Continuous>  dextrose 5%. 1000 milliLiter(s) (50 mL/Hr) IV Continuous <Continuous>  dextrose 50% Injectable 25 Gram(s) IV Push once  dextrose 50% Injectable 12.5 Gram(s) IV Push once  dextrose 50% Injectable 25 Gram(s) IV Push once  glucagon  Injectable 1 milliGRAM(s) IntraMuscular once  heparin   Injectable 5000 Unit(s) SubCutaneous every 12 hours  insulin glargine Injectable (LANTUS) 8 Unit(s) SubCutaneous every morning  insulin lispro (ADMELOG) corrective regimen sliding scale   SubCutaneous every 6 hours  LORazepam     Tablet 1 milliGRAM(s) Oral every 4 hours  midodrine 15 milliGRAM(s) Oral every 8 hours  norepinephrine Infusion 0.05 MICROgram(s)/kG/Min (5.35 mL/Hr) IV Continuous <Continuous>  pantoprazole  Injectable 40 milliGRAM(s) IV Push daily  polyethylene glycol 3350 17 Gram(s) Oral every 12 hours  povidone iodine 10% Solution 1 Application(s) Topical daily  senna 2 Tablet(s) Oral at bedtime  sodium chloride 0.9%. 1000 milliLiter(s) (100 mL/Hr) IV Continuous <Continuous>    MEDICATIONS  (PRN):  dextrose Oral Gel 15 Gram(s) Oral once PRN Blood Glucose LESS THAN 70 milliGRAM(s)/deciliter  LORazepam   Injectable 2 milliGRAM(s) IV Push every 6 hours PRN Agitation  sodium chloride 0.9% lock flush 10 milliLiter(s) IV Push every 1 hour PRN Pre/post blood products, medications, blood draw, and to maintain line patency         Vitals log        ICU Vital Signs Last 24 Hrs  T(C): 36.6 (02 Dec 2022 04:00), Max: 36.8 (01 Dec 2022 08:00)  T(F): 97.8 (02 Dec 2022 04:00), Max: 98.2 (01 Dec 2022 08:00)  HR: 77 (02 Dec 2022 06:44) (54 - 102)  BP: 138/66 (02 Dec 2022 04:00) (99/47 - 138/66)  BP(mean): 87 (02 Dec 2022 04:00) (63 - 93)  ABP: --  ABP(mean): --  RR: 26 (02 Dec 2022 04:00) (17 - 40)  SpO2: 100% (02 Dec 2022 06:44) (93% - 100%)    O2 Parameters below as of 02 Dec 2022 06:44  Patient On (Oxygen Delivery Method): ventilator,100             Mode: AC/ CMV (Assist Control/ Continuous Mandatory Ventilation)  RR (machine): 18  TV (machine): 350  FiO2: 100  PEEP: 8  ITime: 1  MAP: 17  PIP: 41      Input and Output:  I&O's Detail    30 Nov 2022 07:01  -  01 Dec 2022 07:00  --------------------------------------------------------  IN:    Enteral Tube Flush: 575 mL    Glucerna 1.5: 1200 mL    IV PiggyBack: 300 mL  Total IN: 2075 mL    OUT:  Total OUT: 0 mL    Total NET: 2075 mL      01 Dec 2022 07:01  -  02 Dec 2022 06:52  --------------------------------------------------------  IN:    Enteral Tube Flush: 400 mL    Glucerna 1.5: 800 mL    IV PiggyBack: 100 mL  Total IN: 1300 mL    OUT:    Norepinephrine: 0 mL    sodium chloride 0.9%: 0 mL  Total OUT: 0 mL    Total NET: 1300 mL          Lab Data                        7.8    5.85  )-----------( 157      ( 01 Dec 2022 12:00 )             26.2     12-01    141  |  103  |  9   ----------------------------<  115<H>  4.2   |  32<H>  |  1.05    Ca    8.1<L>      01 Dec 2022 05:58    TPro  6.5  /  Alb  1.4<L>  /  TBili  0.4  /  DBili  x   /  AST  26  /  ALT  <10<L>  /  AlkPhos  118  12-01            Review of Systems	      Objective     Physical Examination    heart s1s2  lung dec BS  head nc  trach  peg      Pertinent Lab findings & Imaging      Annalise:  NO   Adequate UO     I&O's Detail    30 Nov 2022 07:01  -  01 Dec 2022 07:00  --------------------------------------------------------  IN:    Enteral Tube Flush: 575 mL    Glucerna 1.5: 1200 mL    IV PiggyBack: 300 mL  Total IN: 2075 mL    OUT:  Total OUT: 0 mL    Total NET: 2075 mL      01 Dec 2022 07:01  -  02 Dec 2022 06:52  --------------------------------------------------------  IN:    Enteral Tube Flush: 400 mL    Glucerna 1.5: 800 mL    IV PiggyBack: 100 mL  Total IN: 1300 mL    OUT:    Norepinephrine: 0 mL    sodium chloride 0.9%: 0 mL  Total OUT: 0 mL    Total NET: 1300 mL               Discussed with:     Cultures:	        Radiology

## 2022-12-02 NOTE — PROGRESS NOTE ADULT - SUBJECTIVE AND OBJECTIVE BOX
Patient is a 77y old  Female who presents with a chief complaint of diaphoresis and fever (02 Dec 2022 09:53)      INTERVAL HPI/OVERNIGHT EVENTS:    continues to be on 100% O2, unable to wean.      MEDICATIONS  (STANDING):  albuterol    90 MICROgram(s) HFA Inhaler 2 Puff(s) Inhalation two times a day  chlorhexidine 2% Cloths 1 Application(s) Topical daily  dextrose 5%. 1000 milliLiter(s) (100 mL/Hr) IV Continuous <Continuous>  dextrose 5%. 1000 milliLiter(s) (50 mL/Hr) IV Continuous <Continuous>  dextrose 50% Injectable 25 Gram(s) IV Push once  dextrose 50% Injectable 12.5 Gram(s) IV Push once  dextrose 50% Injectable 25 Gram(s) IV Push once  glucagon  Injectable 1 milliGRAM(s) IntraMuscular once  heparin   Injectable 5000 Unit(s) SubCutaneous every 12 hours  insulin glargine Injectable (LANTUS) 8 Unit(s) SubCutaneous every morning  insulin lispro (ADMELOG) corrective regimen sliding scale   SubCutaneous every 6 hours  LORazepam     Tablet 1 milliGRAM(s) Oral every 4 hours  midodrine 15 milliGRAM(s) Oral every 8 hours  norepinephrine Infusion 0.05 MICROgram(s)/kG/Min (5.35 mL/Hr) IV Continuous <Continuous>  pantoprazole  Injectable 40 milliGRAM(s) IV Push daily  polyethylene glycol 3350 17 Gram(s) Oral every 12 hours  povidone iodine 10% Solution 1 Application(s) Topical daily  senna 2 Tablet(s) Oral at bedtime  sodium chloride 0.9%. 1000 milliLiter(s) (100 mL/Hr) IV Continuous <Continuous>    MEDICATIONS  (PRN):  dextrose Oral Gel 15 Gram(s) Oral once PRN Blood Glucose LESS THAN 70 milliGRAM(s)/deciliter  LORazepam   Injectable 2 milliGRAM(s) IV Push every 6 hours PRN Agitation  sodium chloride 0.9% lock flush 10 milliLiter(s) IV Push every 1 hour PRN Pre/post blood products, medications, blood draw, and to maintain line patency      Allergies    codeine (Hives)    Intolerances        REVIEW OF SYSTEMS:    unable to wean    Vital Signs Last 24 Hrs  T(C): 37.4 (02 Dec 2022 12:09), Max: 37.4 (02 Dec 2022 12:09)  T(F): 99.4 (02 Dec 2022 12:09), Max: 99.4 (02 Dec 2022 12:09)  HR: 74 (02 Dec 2022 11:32) (54 - 104)  BP: 134/67 (02 Dec 2022 10:00) (103/55 - 154/81)  BP(mean): 87 (02 Dec 2022 10:00) (71 - 104)  RR: 19 (02 Dec 2022 10:00) (17 - 40)  SpO2: 99% (02 Dec 2022 11:32) (93% - 100%)    Parameters below as of 02 Dec 2022 10:00  Patient On (Oxygen Delivery Method): ventilator        PHYSICAL EXAM:  GENERAL: chronically ill appearing, emaciated, NAD, obtunded  HEAD: atraumatic  EYES: conjunctiva clear  NECK: (+)trach in place, clean  RESPIRATORY: mechanical breath sounds, diminished, vent in place, no gross crackles or rales  CARDIOVASCULAR:  regular rate and rhythm, no murmurs or rubs or gallops, 2+ peripheral pulses  GASTROINTESTINAL:  soft, +PEG in place, nondistended, bowel sounds present  EXTREMITIES: no clubbing or cyanosis or edema  MUSCULOSKELETAL: (+)diffuse contractures in all joints  NERVOUS SYSTEM: does not respond to pain    LABS:                        9.1    8.74  )-----------( 214      ( 02 Dec 2022 07:25 )             30.7     02 Dec 2022 07:25    140    |  102    |  24     ----------------------------<  169    4.2     |  31     |  1.08     Ca    8.4        02 Dec 2022 07:25    TPro  7.6    /  Alb  1.8    /  TBili  0.4    /  DBili  x      /  AST  20     /  ALT  <10    /  AlkPhos  168    02 Dec 2022 07:25        CAPILLARY BLOOD GLUCOSE      POCT Blood Glucose.: 157 mg/dL (02 Dec 2022 11:02)  POCT Blood Glucose.: 159 mg/dL (02 Dec 2022 09:16)  POCT Blood Glucose.: 169 mg/dL (02 Dec 2022 06:24)  POCT Blood Glucose.: 137 mg/dL (02 Dec 2022 00:13)  POCT Blood Glucose.: 164 mg/dL (01 Dec 2022 17:25)      RADIOLOGY & ADDITIONAL TESTS:

## 2022-12-03 LAB
ANION GAP SERPL CALC-SCNC: 8 MMOL/L — SIGNIFICANT CHANGE UP (ref 5–17)
BASOPHILS # BLD AUTO: 0.02 K/UL — SIGNIFICANT CHANGE UP (ref 0–0.2)
BASOPHILS NFR BLD AUTO: 0.2 % — SIGNIFICANT CHANGE UP (ref 0–2)
BUN SERPL-MCNC: 22 MG/DL — SIGNIFICANT CHANGE UP (ref 7–23)
CALCIUM SERPL-MCNC: 8.1 MG/DL — LOW (ref 8.4–10.5)
CHLORIDE SERPL-SCNC: 103 MMOL/L — SIGNIFICANT CHANGE UP (ref 96–108)
CO2 SERPL-SCNC: 30 MMOL/L — SIGNIFICANT CHANGE UP (ref 22–31)
CREAT SERPL-MCNC: 0.94 MG/DL — SIGNIFICANT CHANGE UP (ref 0.5–1.3)
EGFR: 62 ML/MIN/1.73M2 — SIGNIFICANT CHANGE UP
EOSINOPHIL # BLD AUTO: 0.11 K/UL — SIGNIFICANT CHANGE UP (ref 0–0.5)
EOSINOPHIL NFR BLD AUTO: 1.2 % — SIGNIFICANT CHANGE UP (ref 0–6)
GLUCOSE SERPL-MCNC: 131 MG/DL — HIGH (ref 70–99)
HCT VFR BLD CALC: 28.1 % — LOW (ref 34.5–45)
HGB BLD-MCNC: 8.3 G/DL — LOW (ref 11.5–15.5)
IMM GRANULOCYTES NFR BLD AUTO: 0.5 % — SIGNIFICANT CHANGE UP (ref 0–0.9)
LYMPHOCYTES # BLD AUTO: 0.87 K/UL — LOW (ref 1–3.3)
LYMPHOCYTES # BLD AUTO: 9.8 % — LOW (ref 13–44)
MAGNESIUM SERPL-MCNC: 2.3 MG/DL — SIGNIFICANT CHANGE UP (ref 1.6–2.6)
MCHC RBC-ENTMCNC: 27.4 PG — SIGNIFICANT CHANGE UP (ref 27–34)
MCHC RBC-ENTMCNC: 29.5 GM/DL — LOW (ref 32–36)
MCV RBC AUTO: 92.7 FL — SIGNIFICANT CHANGE UP (ref 80–100)
MONOCYTES # BLD AUTO: 0.66 K/UL — SIGNIFICANT CHANGE UP (ref 0–0.9)
MONOCYTES NFR BLD AUTO: 7.4 % — SIGNIFICANT CHANGE UP (ref 2–14)
NEUTROPHILS # BLD AUTO: 7.17 K/UL — SIGNIFICANT CHANGE UP (ref 1.8–7.4)
NEUTROPHILS NFR BLD AUTO: 80.9 % — HIGH (ref 43–77)
NRBC # BLD: 0 /100 WBCS — SIGNIFICANT CHANGE UP (ref 0–0)
PHOSPHATE SERPL-MCNC: 3.9 MG/DL — SIGNIFICANT CHANGE UP (ref 2.5–4.5)
PLATELET # BLD AUTO: 185 K/UL — SIGNIFICANT CHANGE UP (ref 150–400)
POTASSIUM SERPL-MCNC: 4.7 MMOL/L — SIGNIFICANT CHANGE UP (ref 3.5–5.3)
POTASSIUM SERPL-SCNC: 4.7 MMOL/L — SIGNIFICANT CHANGE UP (ref 3.5–5.3)
RBC # BLD: 3.03 M/UL — LOW (ref 3.8–5.2)
RBC # FLD: 16.1 % — HIGH (ref 10.3–14.5)
SODIUM SERPL-SCNC: 141 MMOL/L — SIGNIFICANT CHANGE UP (ref 135–145)
WBC # BLD: 8.87 K/UL — SIGNIFICANT CHANGE UP (ref 3.8–10.5)
WBC # FLD AUTO: 8.87 K/UL — SIGNIFICANT CHANGE UP (ref 3.8–10.5)

## 2022-12-03 PROCEDURE — 99232 SBSQ HOSP IP/OBS MODERATE 35: CPT

## 2022-12-03 RX ADMIN — SENNA PLUS 2 TABLET(S): 8.6 TABLET ORAL at 21:43

## 2022-12-03 RX ADMIN — POLYETHYLENE GLYCOL 3350 17 GRAM(S): 17 POWDER, FOR SOLUTION ORAL at 05:44

## 2022-12-03 RX ADMIN — Medication 2: at 11:32

## 2022-12-03 RX ADMIN — PANTOPRAZOLE SODIUM 40 MILLIGRAM(S): 20 TABLET, DELAYED RELEASE ORAL at 11:29

## 2022-12-03 RX ADMIN — MIDODRINE HYDROCHLORIDE 15 MILLIGRAM(S): 2.5 TABLET ORAL at 14:13

## 2022-12-03 RX ADMIN — HEPARIN SODIUM 5000 UNIT(S): 5000 INJECTION INTRAVENOUS; SUBCUTANEOUS at 05:44

## 2022-12-03 RX ADMIN — HEPARIN SODIUM 5000 UNIT(S): 5000 INJECTION INTRAVENOUS; SUBCUTANEOUS at 17:15

## 2022-12-03 RX ADMIN — Medication 1 MILLIGRAM(S): at 14:13

## 2022-12-03 RX ADMIN — Medication 1 MILLIGRAM(S): at 02:08

## 2022-12-03 RX ADMIN — INSULIN GLARGINE 8 UNIT(S): 100 INJECTION, SOLUTION SUBCUTANEOUS at 08:06

## 2022-12-03 RX ADMIN — Medication 2: at 08:10

## 2022-12-03 RX ADMIN — ALBUTEROL 2 PUFF(S): 90 AEROSOL, METERED ORAL at 20:25

## 2022-12-03 RX ADMIN — ALBUTEROL 2 PUFF(S): 90 AEROSOL, METERED ORAL at 07:18

## 2022-12-03 RX ADMIN — Medication 1 APPLICATION(S): at 11:42

## 2022-12-03 RX ADMIN — Medication 1 MILLIGRAM(S): at 10:24

## 2022-12-03 RX ADMIN — Medication 1 MILLIGRAM(S): at 17:14

## 2022-12-03 RX ADMIN — Medication 2 MILLIGRAM(S): at 20:12

## 2022-12-03 RX ADMIN — POLYETHYLENE GLYCOL 3350 17 GRAM(S): 17 POWDER, FOR SOLUTION ORAL at 18:12

## 2022-12-03 RX ADMIN — Medication 1 MILLIGRAM(S): at 21:43

## 2022-12-03 RX ADMIN — Medication 1 MILLIGRAM(S): at 05:44

## 2022-12-03 RX ADMIN — Medication 2 MILLIGRAM(S): at 11:29

## 2022-12-03 RX ADMIN — CHLORHEXIDINE GLUCONATE 1 APPLICATION(S): 213 SOLUTION TOPICAL at 05:44

## 2022-12-03 RX ADMIN — Medication 2 MILLIGRAM(S): at 03:30

## 2022-12-03 RX ADMIN — MIDODRINE HYDROCHLORIDE 15 MILLIGRAM(S): 2.5 TABLET ORAL at 05:44

## 2022-12-03 RX ADMIN — MIDODRINE HYDROCHLORIDE 15 MILLIGRAM(S): 2.5 TABLET ORAL at 21:43

## 2022-12-03 NOTE — PROGRESS NOTE ADULT - SUBJECTIVE AND OBJECTIVE BOX
Date/Time Patient Seen:  		  Referring MD:   Data Reviewed	       Patient is a 77y old  Female who presents with a chief complaint of diaphoresis and fever (02 Dec 2022 14:27)      Subjective/HPI     PAST MEDICAL & SURGICAL HISTORY:  Dementia of frontal lobe type    Aphasic stroke    Diabetes mellitus    Respiratory failure    Hypertension    GERD (gastroesophageal reflux disease)    Constipation    Respiratory failure    CVA (cerebral vascular accident)    HTN (hypertension)    DM (diabetes mellitus)    Advanced dementia    COVID-19 virus detected    Quadriplegia    Pneumonia    Type II diabetes mellitus    Hx of appendectomy    Gastrostomy in place    Tracheostomy in place    Tracheostomy tube present    Feeding by G-tube          Medication list         MEDICATIONS  (STANDING):  albuterol    90 MICROgram(s) HFA Inhaler 2 Puff(s) Inhalation two times a day  chlorhexidine 2% Cloths 1 Application(s) Topical daily  dextrose 5%. 1000 milliLiter(s) (100 mL/Hr) IV Continuous <Continuous>  dextrose 5%. 1000 milliLiter(s) (50 mL/Hr) IV Continuous <Continuous>  dextrose 50% Injectable 25 Gram(s) IV Push once  dextrose 50% Injectable 12.5 Gram(s) IV Push once  dextrose 50% Injectable 25 Gram(s) IV Push once  glucagon  Injectable 1 milliGRAM(s) IntraMuscular once  heparin   Injectable 5000 Unit(s) SubCutaneous every 12 hours  insulin glargine Injectable (LANTUS) 8 Unit(s) SubCutaneous every morning  insulin lispro (ADMELOG) corrective regimen sliding scale   SubCutaneous every 6 hours  LORazepam     Tablet 1 milliGRAM(s) Oral every 4 hours  midodrine 15 milliGRAM(s) Oral every 8 hours  norepinephrine Infusion 0.05 MICROgram(s)/kG/Min (5.35 mL/Hr) IV Continuous <Continuous>  pantoprazole  Injectable 40 milliGRAM(s) IV Push daily  polyethylene glycol 3350 17 Gram(s) Oral every 12 hours  povidone iodine 10% Solution 1 Application(s) Topical daily  senna 2 Tablet(s) Oral at bedtime    MEDICATIONS  (PRN):  dextrose Oral Gel 15 Gram(s) Oral once PRN Blood Glucose LESS THAN 70 milliGRAM(s)/deciliter  LORazepam   Injectable 2 milliGRAM(s) IV Push every 6 hours PRN Agitation  sodium chloride 0.9% lock flush 10 milliLiter(s) IV Push every 1 hour PRN Pre/post blood products, medications, blood draw, and to maintain line patency         Vitals log        ICU Vital Signs Last 24 Hrs  T(C): 36.4 (03 Dec 2022 05:24), Max: 37.4 (02 Dec 2022 12:09)  T(F): 97.5 (03 Dec 2022 05:24), Max: 99.4 (02 Dec 2022 12:09)  HR: 87 (03 Dec 2022 06:19) (57 - 774)  BP: 94/53 (03 Dec 2022 05:00) (94/53 - 181/82)  BP(mean): 67 (03 Dec 2022 05:00) (67 - 110)  ABP: --  ABP(mean): --  RR: 18 (03 Dec 2022 05:00) (14 - 35)  SpO2: 100% (03 Dec 2022 06:19) (93% - 100%)    O2 Parameters below as of 03 Dec 2022 05:00  Patient On (Oxygen Delivery Method): ventilator             Mode: AC/ CMV (Assist Control/ Continuous Mandatory Ventilation)  RR (machine): 18  TV (machine): 350  FiO2: 100  PEEP: 8  ITime: 1  MAP: 22  PIP: 49      Input and Output:  I&O's Detail    01 Dec 2022 07:01  -  02 Dec 2022 07:00  --------------------------------------------------------  IN:    Enteral Tube Flush: 450 mL    Glucerna 1.5: 900 mL    IV PiggyBack: 100 mL  Total IN: 1450 mL    OUT:    Incontinent per Collection Bag (mL): 300 mL    Norepinephrine: 0 mL    sodium chloride 0.9%: 0 mL  Total OUT: 300 mL    Total NET: 1150 mL      02 Dec 2022 07:01  -  03 Dec 2022 06:55  --------------------------------------------------------  IN:    Enteral Tube Flush: 305 mL    Free Water: 220 mL    Glucerna 1.5: 800 mL  Total IN: 1325 mL    OUT:    Norepinephrine: 0 mL  Total OUT: 0 mL    Total NET: 1325 mL          Lab Data                        8.3    8.87  )-----------( 185      ( 03 Dec 2022 06:27 )             28.1     12-03    141  |  103  |  22  ----------------------------<  131<H>  4.7   |  30  |  0.94    Ca    8.1<L>      03 Dec 2022 06:27    TPro  7.6  /  Alb  1.8<L>  /  TBili  0.4  /  DBili  x   /  AST  20  /  ALT  <10<L>  /  AlkPhos  168<H>  12-02            Review of Systems	      Objective     Physical Examination    heart s1s2  lung dc BS  head nc      Pertinent Lab findings & Imaging      Annalise:  NO   Adequate UO     I&O's Detail    01 Dec 2022 07:01  -  02 Dec 2022 07:00  --------------------------------------------------------  IN:    Enteral Tube Flush: 450 mL    Glucerna 1.5: 900 mL    IV PiggyBack: 100 mL  Total IN: 1450 mL    OUT:    Incontinent per Collection Bag (mL): 300 mL    Norepinephrine: 0 mL    sodium chloride 0.9%: 0 mL  Total OUT: 300 mL    Total NET: 1150 mL      02 Dec 2022 07:01  -  03 Dec 2022 06:55  --------------------------------------------------------  IN:    Enteral Tube Flush: 305 mL    Free Water: 220 mL    Glucerna 1.5: 800 mL  Total IN: 1325 mL    OUT:    Norepinephrine: 0 mL  Total OUT: 0 mL    Total NET: 1325 mL               Discussed with:     Cultures:	        Radiology

## 2022-12-03 NOTE — PROGRESS NOTE ADULT - SUBJECTIVE AND OBJECTIVE BOX
Patient is a 77y Female with a known history of :    HPI:  77F with dementia, COPD with chronic respiratory failure s/p trach, cardiac arrest, CHH, quadriplegia, CVA, CKD, anemia, PEG tube, and multiple hospital admissions for respiratory distress presents to the ED [facility] from [facility] for diaphoresis and fever.   Patient unable to provide any history.  Patient was just discharged here from 11/12-11/16 for low grade fever, midline malfunction, leukocytosis, fever, concerning for sepsis possibly 2/2 unresolved VAP.  Was treated with meropenem.  Per notes from [facility], around 3am, patient was does "not look good" and was noted to be diaphoretic, tachypneic, and febrile to 100.9F.  Was sent here for further evaluation.  In the ED, patient was febrile to 101.5F and tachycardic to 112.  Labs showed leukocytosis of 16 (up from 6.5), hyperkalemia 5.4, ABG 7.59/36/273.  Patient is being readmitted for sepsis 2/2 presumably from VAP.   (21 Nov 2022 04:55)      REVIEW OF SYSTEMS:    CONSTITUTIONAL: No fever, weight loss, or fatigue  EYES: No eye pain, visual disturbances, or discharge  ENMT:  No difficulty hearing, tinnitus, vertigo; No sinus or throat pain  NECK: No pain or stiffness  BREASTS: No pain, masses, or nipple discharge  RESPIRATORY: No cough, wheezing, chills or hemoptysis; No shortness of breath  CARDIOVASCULAR: No chest pain, palpitations, dizziness, or leg swelling  GASTROINTESTINAL: No abdominal or epigastric pain. No nausea, vomiting, or hematemesis; No diarrhea or constipation. No melena or hematochezia.  GENITOURINARY: No dysuria, frequency, hematuria, or incontinence  NEUROLOGICAL: No headaches, memory loss, loss of strength, numbness, or tremors  SKIN: No itching, burning, rashes, or lesions   LYMPH NODES: No enlarged glands  ENDOCRINE: No heat or cold intolerance; No hair loss  MUSCULOSKELETAL: No joint pain or swelling; No muscle, back, or extremity pain  PSYCHIATRIC: No depression, anxiety, mood swings, or difficulty sleeping  HEME/LYMPH: No easy bruising, or bleeding gums  ALLERGY AND IMMUNOLOGIC: No hives or eczema    MEDICATIONS  (STANDING):  albuterol    90 MICROgram(s) HFA Inhaler 2 Puff(s) Inhalation two times a day  chlorhexidine 2% Cloths 1 Application(s) Topical daily  dextrose 5%. 1000 milliLiter(s) (50 mL/Hr) IV Continuous <Continuous>  dextrose 5%. 1000 milliLiter(s) (100 mL/Hr) IV Continuous <Continuous>  dextrose 50% Injectable 25 Gram(s) IV Push once  dextrose 50% Injectable 12.5 Gram(s) IV Push once  dextrose 50% Injectable 25 Gram(s) IV Push once  glucagon  Injectable 1 milliGRAM(s) IntraMuscular once  heparin   Injectable 5000 Unit(s) SubCutaneous every 12 hours  insulin glargine Injectable (LANTUS) 8 Unit(s) SubCutaneous every morning  insulin lispro (ADMELOG) corrective regimen sliding scale   SubCutaneous every 6 hours  LORazepam     Tablet 1 milliGRAM(s) Oral every 4 hours  midodrine 15 milliGRAM(s) Oral every 8 hours  norepinephrine Infusion 0.05 MICROgram(s)/kG/Min (5.35 mL/Hr) IV Continuous <Continuous>  pantoprazole  Injectable 40 milliGRAM(s) IV Push daily  polyethylene glycol 3350 17 Gram(s) Oral every 12 hours  povidone iodine 10% Solution 1 Application(s) Topical daily  senna 2 Tablet(s) Oral at bedtime    MEDICATIONS  (PRN):  dextrose Oral Gel 15 Gram(s) Oral once PRN Blood Glucose LESS THAN 70 milliGRAM(s)/deciliter  LORazepam   Injectable 2 milliGRAM(s) IV Push every 6 hours PRN Agitation  sodium chloride 0.9% lock flush 10 milliLiter(s) IV Push every 1 hour PRN Pre/post blood products, medications, blood draw, and to maintain line patency      ALLERGIES: codeine (Hives)      FAMILY HISTORY:  No pertinent family history in first degree relatives        Social history:  Alochol:   Smoking:   Drug Use:   Marital Status:     PHYSICAL EXAMINATION:  -----------------------------  T(C): 36.4 (12-03-22 @ 05:24), Max: 37.4 (12-02-22 @ 12:09)  HR: 73 (12-03-22 @ 09:00) (57 - 774)  BP: 132/71 (12-03-22 @ 09:00) (94/53 - 181/82)  RR: 37 (12-03-22 @ 09:00) (14 - 37)  SpO2: 100% (12-03-22 @ 09:00) (93% - 100%)  Wt(kg): --    12-02 @ 07:01  -  12-03 @ 07:00  --------------------------------------------------------  IN:    Enteral Tube Flush: 335 mL    Free Water: 220 mL    Glucerna 1.5: 1200 mL  Total IN: 1755 mL    OUT:    Incontinent per Collection Bag (mL): 75 mL    Norepinephrine: 0 mL  Total OUT: 75 mL    Total NET: 1680 mL            Constitutional: well developed, normal appearance, well groomed, well nourished, no deformities and no acute distress.   Eyes: the conjunctiva exhibited no abnormalities and the eyelids demonstrated no xanthelasmas.   HEENT: normal oral mucosa, no oral pallor and no oral cyanosis.   Neck: normal jugular venous A waves present, normal jugular venous V waves present and no jugular venous obregon A waves.   Pulmonary: no respiratory distress, normal respiratory rhythm and effort, no accessory muscle use and lungs were clear to auscultation bilaterally. B/L anterior scattered rhonchi  Cardiovascular: heart rate and rhythm were normal, normal S1 and S2 and no murmur, gallop, rub, heave or thrill are present.   Musculoskeletal: the gait could not be assessed.   Extremities: no clubbing of the fingernails, no localized cyanosis, no petechial hemorrhages and no ischemic changes.   Skin: normal skin color and pigmentation, no rash, no venous stasis, no skin lesions, no skin ulcer and no xanthoma was observed.       LABS:   --------  12-03    141  |  103  |  22  ----------------------------<  131<H>  4.7   |  30  |  0.94    Ca    8.1<L>      03 Dec 2022 06:27  Phos  3.9     12-03  Mg     2.3     12-03    TPro  7.6  /  Alb  1.8<L>  /  TBili  0.4  /  DBili  x   /  AST  20  /  ALT  <10<L>  /  AlkPhos  168<H>  12-02                         8.3    8.87  )-----------( 185      ( 03 Dec 2022 06:27 )             28.1                   Radiology:

## 2022-12-03 NOTE — PROGRESS NOTE ADULT - ASSESSMENT
77F with dementia, COPD with chronic respiratory failure s/p trach, cardiac arrest, CHH, quadriplegia, CVA, CKD, anemia, PEG tube, and multiple hospital admissions for respiratory distress presents to the ED BIBEMS from [facility] for diaphoresis and fever.         ativan  s/p IVF  vent support    GOC   pt is full code   is full code  assist with needs -   oral hygiene  skin care  recurrent admissions  long term resident of SNF  vent dep  anoxic brain injury - dementia - vegetative state

## 2022-12-03 NOTE — PROGRESS NOTE ADULT - SUBJECTIVE AND OBJECTIVE BOX
Patient is a 77y old  Female who presents with a chief complaint of diaphoresis and fever (03 Dec 2022 07:38)      INTERVAL HPI/OVERNIGHT EVENTS:    still unable to wean off 100% O2      MEDICATIONS  (STANDING):  albuterol    90 MICROgram(s) HFA Inhaler 2 Puff(s) Inhalation two times a day  chlorhexidine 2% Cloths 1 Application(s) Topical daily  dextrose 5%. 1000 milliLiter(s) (50 mL/Hr) IV Continuous <Continuous>  dextrose 5%. 1000 milliLiter(s) (100 mL/Hr) IV Continuous <Continuous>  dextrose 50% Injectable 25 Gram(s) IV Push once  dextrose 50% Injectable 12.5 Gram(s) IV Push once  dextrose 50% Injectable 25 Gram(s) IV Push once  glucagon  Injectable 1 milliGRAM(s) IntraMuscular once  heparin   Injectable 5000 Unit(s) SubCutaneous every 12 hours  insulin glargine Injectable (LANTUS) 8 Unit(s) SubCutaneous every morning  insulin lispro (ADMELOG) corrective regimen sliding scale   SubCutaneous every 6 hours  LORazepam     Tablet 1 milliGRAM(s) Oral every 4 hours  midodrine 15 milliGRAM(s) Oral every 8 hours  norepinephrine Infusion 0.05 MICROgram(s)/kG/Min (5.35 mL/Hr) IV Continuous <Continuous>  pantoprazole  Injectable 40 milliGRAM(s) IV Push daily  polyethylene glycol 3350 17 Gram(s) Oral every 12 hours  povidone iodine 10% Solution 1 Application(s) Topical daily  senna 2 Tablet(s) Oral at bedtime    MEDICATIONS  (PRN):  dextrose Oral Gel 15 Gram(s) Oral once PRN Blood Glucose LESS THAN 70 milliGRAM(s)/deciliter  LORazepam   Injectable 2 milliGRAM(s) IV Push every 6 hours PRN Agitation  sodium chloride 0.9% lock flush 10 milliLiter(s) IV Push every 1 hour PRN Pre/post blood products, medications, blood draw, and to maintain line patency      Allergies    codeine (Hives)    Intolerances        REVIEW OF SYSTEMS:  unable to obtain    Vital Signs Last 24 Hrs  T(C): 36.4 (03 Dec 2022 05:24), Max: 37.4 (02 Dec 2022 12:09)  T(F): 97.5 (03 Dec 2022 05:24), Max: 99.4 (02 Dec 2022 12:09)  HR: 73 (03 Dec 2022 08:00) (57 - 774)  BP: 131/69 (03 Dec 2022 08:00) (94/53 - 181/82)  BP(mean): 89 (03 Dec 2022 08:00) (67 - 110)  RR: 28 (03 Dec 2022 08:00) (14 - 35)  SpO2: 100% (03 Dec 2022 08:00) (93% - 100%)    Parameters below as of 03 Dec 2022 06:00  Patient On (Oxygen Delivery Method): ventilator        PHYSICAL EXAM:  GENERAL: chronically ill appearing, emaciated, NAD, obtunded  HEAD: atraumatic  EYES: conjunctiva clear  NECK: (+)trach in place, clean  RESPIRATORY: mechanical breath sounds, diminished, vent in place, no gross crackles or rales  CARDIOVASCULAR:  regular rate and rhythm, no murmurs or rubs or gallops, 2+ peripheral pulses  GASTROINTESTINAL:  soft, +PEG in place, nondistended, bowel sounds present  EXTREMITIES: no clubbing or cyanosis or edema  MUSCULOSKELETAL: (+)diffuse contractures in all joints  NERVOUS SYSTEM: does not respond to pain      LABS:                        8.3    8.87  )-----------( 185      ( 03 Dec 2022 06:27 )             28.1     03 Dec 2022 06:27    141    |  103    |  22     ----------------------------<  131    4.7     |  30     |  0.94     Ca    8.1        03 Dec 2022 06:27  Phos  3.9       03 Dec 2022 06:27  Mg     2.3       03 Dec 2022 06:27          CAPILLARY BLOOD GLUCOSE      POCT Blood Glucose.: 151 mg/dL (03 Dec 2022 08:05)  POCT Blood Glucose.: 163 mg/dL (03 Dec 2022 06:45)  POCT Blood Glucose.: 138 mg/dL (02 Dec 2022 23:59)  POCT Blood Glucose.: 125 mg/dL (02 Dec 2022 17:43)  POCT Blood Glucose.: 157 mg/dL (02 Dec 2022 11:02)  POCT Blood Glucose.: 159 mg/dL (02 Dec 2022 09:16)      RADIOLOGY & ADDITIONAL TESTS:

## 2022-12-03 NOTE — PROGRESS NOTE ADULT - SUBJECTIVE AND OBJECTIVE BOX
Covering [facility] DIVISION of INFECTIOUS DISEASE  MOIZ Chun, SOLANGE Higgins G. Casimir PARASHARAMIBREA is a 77yFemale , patient examined and chart reviewed.     INTERVAL HPI/ OVERNIGHT EVENTS:  Vegetative state. Afebrile.  Chronic vent. No events  on 100% fi02.      Past Medical History--  PAST MEDICAL & SURGICAL HISTORY:  Dementia of frontal lobe type  Aphasic stroke  Diabetes mellitus  Respiratory failure  Hypertension  GERD (gastroesophageal reflux disease)  Constipation  Respiratory failure  CVA (cerebral vascular accident)  HTN (hypertension)  Advanced dementia  COVID-19 virus detected  Quadriplegia  Pneumoni  Type II diabetes mellitus  Hx of appendectomy  Gastrostomy in place  Tracheostomy in place        For details regarding the patient's social history, family history, and other miscellaneous elements, please refer the initial infectious diseases consultation and/or the admitting history and physical examination for this admission.      ROS:  Unable to obtain due to : pt's condition      ALLERGIES  codeine (Hives)      Current inpatient medications :    ANTIBIOTICS/RELEVANT:    MEDICATIONS  (STANDING):  albuterol    90 MICROgram(s) HFA Inhaler 2 Puff(s) Inhalation two times a day  chlorhexidine 2% Cloths 1 Application(s) Topical daily  dextrose 5%. 1000 milliLiter(s) (100 mL/Hr) IV Continuous <Continuous>  dextrose 5%. 1000 milliLiter(s) (50 mL/Hr) IV Continuous <Continuous>  dextrose 50% Injectable 25 Gram(s) IV Push once  dextrose 50% Injectable 12.5 Gram(s) IV Push once  dextrose 50% Injectable 25 Gram(s) IV Push once  glucagon  Injectable 1 milliGRAM(s) IntraMuscular once  heparin   Injectable 5000 Unit(s) SubCutaneous every 12 hours  insulin glargine Injectable (LANTUS) 8 Unit(s) SubCutaneous every morning  insulin lispro (ADMELOG) corrective regimen sliding scale   SubCutaneous every 6 hours  LORazepam     Tablet 1 milliGRAM(s) Oral every 4 hours  midodrine 15 milliGRAM(s) Oral every 8 hours  norepinephrine Infusion 0.05 MICROgram(s)/kG/Min (5.35 mL/Hr) IV Continuous <Continuous>  pantoprazole  Injectable 40 milliGRAM(s) IV Push daily  polyethylene glycol 3350 17 Gram(s) Oral every 12 hours  povidone iodine 10% Solution 1 Application(s) Topical daily  senna 2 Tablet(s) Oral at bedtime    MEDICATIONS  (PRN):  dextrose Oral Gel 15 Gram(s) Oral once PRN Blood Glucose LESS THAN 70 milliGRAM(s)/deciliter  LORazepam   Injectable 2 milliGRAM(s) IV Push every 6 hours PRN Agitation  sodium chloride 0.9% lock flush 10 milliLiter(s) IV Push every 1 hour PRN Pre/post blood products, medications, blood draw, and to maintain line patency        Objective:  ICU Vital Signs Last 24 Hrs  T(C): 36.5 (03 Dec 2022 21:19), Max: 36.7 (03 Dec 2022 15:49)  T(F): 97.7 (03 Dec 2022 21:19), Max: 98 (03 Dec 2022 15:49)  HR: 74 (03 Dec 2022 20:47) (57 - 107)  BP: 128/64 (03 Dec 2022 20:00) (94/53 - 178/76)  BP(mean): 84 (03 Dec 2022 20:00) (67 - 104)  RR: 32 (03 Dec 2022 20:00) (14 - 44)  SpO2: 100% (03 Dec 2022 20:47) (93% - 100%)    O2 Parameters below as of 03 Dec 2022 20:47  Patient On (Oxygen Delivery Method): ventilator,100        Physical Exam:  GEN: Chronic vent vegetative state  HEENT: normocephalic and atraumatic.   NECK: Supple.   LUNGS: Decreased  to auscultation.  HEART: Regular rate and rhythm without murmur.  ABDOMEN: Soft, nontender, and nondistended.  Positive bowel sounds.  +G tube  EXTREMITIES: + edema.  NEUROLOGIC: Unresponsive      LABS:                                 8.3    8.87  )-----------( 185      ( 03 Dec 2022 06:27 )             28.1   12-03    141  |  103  |  22  ----------------------------<  131<H>  4.7   |  30  |  0.94    Ca    8.1<L>      03 Dec 2022 06:27  Phos  3.9     12-03  Mg     2.3     12-03    TPro  7.6  /  Alb  1.8<L>  /  TBili  0.4  /  DBili  x   /  AST  20  /  ALT  <10<L>  /  AlkPhos  168<H>  12-02      MICROBIOLOGY:          RADIOLOGY & ADDITIONAL STUDIES:      Assessment :  77F with dementia, COPD with chronic respiratory failure s/p trach, cardiac arrest, CHH, quadriplegia, CVA, CKD, anemia, PEG tube, and multiple hospital admissions for respiratory distress presents to the ED BIBEMS from [facility] for diaphoresis and fever.     Readmitted with sepsis with shock  2/2 recurent VAP.    Sputum cx 11/21 with Acinetobacter baumannii/nosocom group (Carbapenem Resistant) resistant to Bactrim  WBC better  Off pressors   Remains on 100% fi02- unable to be wean down     Plan:  Monitor off antibiotics  Sp Unasyn x 7 days ended 12/2/22  Trend temps and cbc  Asp precautions  Aggressive pulm toileting  Isolation per infection control policy  Vent management per ICU  Very poor prognosis  Continued GOC discussion with  per palliative team      Continue with present regiment.  Appropriate use of antibiotics and adverse effects reviewed.      Critical care time greater then 35 minutes reviewing notes, labs data/ imaging , discussion with multidisciplinary team.    Thank you for allowing me to participate in care of your patient .        Eusebio Chairez MD  Infectious Disease  [phone]

## 2022-12-03 NOTE — PROGRESS NOTE ADULT - ASSESSMENT
The patient is a 77 year old female with a history of HTN, DM, CVA, dementia, chronic respiratory failure s/p trach, PEG, cardiac arrest, anemia, pleural effusions who presents with fever and respiratory distress.    12/3/22  Seen at [facility] ICU  Lying flat, comfortably  Eyes open    Plan:  - Repeat echo with normal LV systolic function and worsened pulmonary pressures, now severe pulm HTN  - CXR with diffuse lung infiltrates  - Continue midodrine 15 mg tid  - Low oncotic pressure leading to third spacing - diuretics likely to have minimal long term effect but can attempt if needed with worsening hypoxia  - Severe pulm HTN - not a candidate for advanced pulmonary therapies  - Pulm and ID follow-up  - Comfort care would be most appropriate

## 2022-12-03 NOTE — PROGRESS NOTE ADULT - SUBJECTIVE AND OBJECTIVE BOX
INTERVAL HPI/OVERNIGHT EVENTS:  No new overnight event.  No N/V/D.  Tolerating diet via peg  Allergies    codeine (Hives)    Intolerances    General:  No wt loss, fevers, chills, night sweats, fatigue,   Eyes:  Good vision, no reported pain  ENT:  No sore throat, pain, runny nose, dysphagia  CV:  No pain, palpitations, hypo/hypertension  Resp:  No dyspnea, cough, tachypnea, wheezing  GI:  No pain, No nausea, No vomiting, No diarrhea, No constipation, No weight loss, No fever, No pruritis, No rectal bleeding, No tarry stools, No dysphagia,  :  No pain, bleeding, incontinence, nocturia  Muscle:  No pain, weakness  Neuro:  No weakness, tingling, memory problems  Psych:  No fatigue, insomnia, mood problems, depression  Endocrine:  No polyuria, polydipsia, cold/heat intolerance  Heme:  No petechiae, ecchymosis, easy bruisability  Skin:  No rash, tattoos, scars, edema      PHYSICAL EXAM:   Vital Signs:  Vital Signs Last 24 Hrs  T(C): 36.6 (03 Dec 2022 12:20), Max: 36.7 (02 Dec 2022 16:09)  T(F): 97.9 (03 Dec 2022 12:20), Max: 98 (02 Dec 2022 16:09)  HR: 76 (03 Dec 2022 14:00) (57 - 774)  BP: 131/89 (03 Dec 2022 14:00) (94/53 - 181/82)  BP(mean): 104 (03 Dec 2022 14:00) (67 - 110)  RR: 20 (03 Dec 2022 14:00) (17 - 41)  SpO2: 100% (03 Dec 2022 14:00) (93% - 100%)    Parameters below as of 03 Dec 2022 06:00  Patient On (Oxygen Delivery Method): ventilator      Daily     Daily Weight in k.3 (03 Dec 2022 11:48)I&O's Summary    02 Dec 2022 07:01  -  03 Dec 2022 07:00  --------------------------------------------------------  IN: 1755 mL / OUT: 75 mL / NET: 1680 mL        GENERAL:  Appears stated age, well-groomed, well-nourished, no distress  HEENT:  NC/AT,  conjunctivae clear and pink, no thyromegaly, nodules, adenopathy, no JVD, sclera -anicteric  CHEST:  Full & symmetric excursion, no increased effort, breath sounds clear  HEART:  Regular rhythm, S1, S2, no murmur/rub/S3/S4, no abdominal bruit, no edema  ABDOMEN:  Soft, non-tender, non-distended, normoactive bowel sounds,  no masses ,no hepato-splenomegaly, no signs of chronic liver disease  EXTEREMITIES:  no cyanosis,clubbing or edema  SKIN:  No rash/erythema/ecchymoses/petechiae/wounds/abscess/warm/dry  NEURO:  Alert, oriented, no asterixis, no tremor, no encephalopathy      LABS:                        8.3    8.87  )-----------( 185      ( 03 Dec 2022 06:27 )             28.1         141  |  103  |  22  ----------------------------<  131<H>  4.7   |  30  |  0.94    Ca    8.1<L>      03 Dec 2022 06:27  Phos  3.9       Mg     2.3         TPro  7.6  /  Alb  1.8<L>  /  TBili  0.4  /  DBili  x   /  AST  20  /  ALT  <10<L>  /  AlkPhos  168<H>          amylase   lipase  RADIOLOGY & ADDITIONAL TESTS:

## 2022-12-03 NOTE — PROGRESS NOTE ADULT - ASSESSMENT
77F with dementia, COPD with chronic respiratory failure s/p trach, cardiac arrest, CHH, quadriplegia, CVA, CKD, anemia, PEG tube, and multiple hospital admissions for respiratory distress presents to the ED BIBEMS from [facility] for diaphoresis and fever.         COPD with chronic respiratory failure s/p trach  unable to wean off 100% O2  poor prognosis  GOC discussion needs to be revisited  pulmonary recs appreciated    ANemia  multifacotrial/ AICD   s/p 1 unit prbc since admission   H and H stable   continue supportive care    Sepsis 2/2 VAP - meets sepsis based on fever + tachycardia + source of infection.  Lactate 1.9  peristent hypotension  Completed unasyn 12/1/22  - cont with vent settings to maintain SaO2 >92%  - cont with midodrine 15mg TID, pressors PRN  - on ativan for agitation  - MRSA neg, trach culture +acinetobacter   wean as tolerating   Trend temps and cbc    Hyperkalemia  resolved  s/p lokelma     Hyponatremia  - Na of 130 today, likely hypovolemic  - urine lytes ordered  - continue to trend    Leukocytosis  - WBC WNL  - no fevers  comleted unasyn  - continue to trend     DM2 on insulin  - cont with lantus and insulin coverage scale with FS q6h while on TF    COPD   - cont with neb treatments    Anemia of chronic disease   - GI eval - conservative management   - cont with ferrous sulfate  - trend CBC    Prognosis grave, patient remains critically ill and is at high risk for abrupt decompensation and death

## 2022-12-03 NOTE — PROGRESS NOTE ADULT - ASSESSMENT
REVIEW OF SYMPTOMS      Able to give (reliable) ROS  NO     PHYSICAL EXAM    HEENT Unremarkable  atraumatic   RESP Fair air entry EXP prolonged    Harsh breath sound Resp distres mild   CARDIAC S1 S2 No S3     NO JVD    ABDOMEN SOFT BS PRESENT NOT DISTENDED No hepatosplenomegaly   PEDAL EDEMA present No calf tenderness  NO rash     GENERAL DATA .   GOC.  full code      ALLGY.     codeine                        WT. 11/21/2022 57  BMI.    11/21/2022 21                          ICU STAY.   admitted ICU 11/21/2022  COVID.  Scv2 11/21/2022 scv2 (-)    PROCEDURE.  11/21/2022 [facility] central line    BEST PRACTICE ISSUES.    HOB ELEVATN. Yes  DVT PPLX.  11/21/2022 hpsc    SQUIRES PPLX.    11/21/2022 protonix 40   INFN PPLX.    11/21/2022 chlorhexidine .12% bid (mrsa)   11/21/2022 chlorhexidine 2% (c li)   SP SW EM.         DIET.  11/21/2022 glucerna 1.5 1000 gt      VS/ PO/IO/ VENT/ DRIPS.  12/3/2022 afeb 120/60   12/3/2022 ac 18/350/8/100     CURRENT ADMISSION  Pt sent back from [facility] 11/21/2022 with fever abn labs Found yto be in shock Norepi started 11/21/2022   Pulm crit care consulted      RECENT ADMISSIONS.  11/12-11/16/2022 11/4-11/10/2022  11/5 stenotrophomonas  uc 11/4 vr enterococc 50-99 candida   11/4/2022 levaquin 750 dced 11/7 doxy  11/8 bactrim 250.3  10/18-11/2/2022 11/21 ADMISSION PROBLEMS.  Vent dependent   .. Trach poa 11/21/2022  .. ac 18/350/8/100   RO VTE   .. 11/26 v duplx (-)   RIJ 11/21/2022   INFECTN   .. Decub ulcers   .. VAP 11/21/2022  ...... trach 11/21 mod acinetobacter carbapenem resist   ...... Bactrim tid  11/21-11/25/2022  ...... 11/25/2022 Unasyn 3.4 x 7d Dr Chairez    COPD  Shock   .. Norepi 11/21/2022-> 11/21-11/27  .. midodrine 11/21/2022   peg poa 11/21/2022  ANEMIA   .. Hb 11/25/2022 Hb 7.5  Hyponatremia  .. Na 11/24-11/26/2022 Na 130 - 135    ASSESSMENT/RECOMMENDATIONS .   RESP.  .. Gas exchange.  11/29/2022 ac 18/350/8/80 747/45/64   11/21/2022 4a On 100% vent 759/36/273   Monitor & target po 90-95%  .. VENT MANAGEMENT.   HOB elevation  Target Pplat 30 (-)  Target PO 90-95%  Target pH 730 (+)  Daily spontaneous breathing trials   Daily sedation vacation   .. Pleural effsn .   CXR 11/21/2022 r gr than l effsn  Effusion has been tapped during prev admissions and is lp exudate If fever or worsening sepsis will plan thorac  RO VTE.  11/26 v duplx (-)   11/26/2022 check cta ch   11/29/2022 attempts made to trasport pat several times last few d but pt becomes unstable when tried to move to ct per staff   .. Bronchospasm.  11/21/2022 albut hfa 2p bid   INFECTION.  .. SCV2 status.  Scv2 11/21/2022 scv2 (-)  .. VAP   w 11/21-11/23-11/24-11/25-11/26-11/27-11/28-11/29-11/30-12/1-12/3/2022   w 16 - 8.8 - 12- 9 - 9.4- 9.1 - 11  - 9.1 - 12.9 - 5.8 - 8.8  Pr 11/21-11/22/2022 pr 1.8 - 1.1  ua 11/21/2022 w 3-5   cxr 11/21 mod to large r effsn somewhat incr from 11/12 central infiltrates possibly congestive rij   rvp 11/21/2022 (-)   rsv 11/21/2022 (-)   uc 11/21 (-)   legn 11/21 (-)   trach 11/21 mod acinetobacter carbapenem resist   mrsa 11/21 (-)   bc 11/21 (-)   11/21/2022 bactrim 285 mg trimeth q 8 h Dr BRITTANY Brantley DCED 11/25/2022 11/25/2022 Unasyn 3.4 x 7d Dr Chairez    CARDIAC.  .. Shock.    11/21/2022 midodrine 10.3 -> 11/22 midodr 15.3   11/21/2022 5a norepi 8 in 250   target map 65 (+)   .. ekg changes.  ekg 11/21/2022 s tach shoirt pr qtc 470 possible rvh   tr 11/22/2022 tr 28   .. CHF  bnp 11/30/2022 22672  11/30/2022 checkj echo   GI.  .. Nutrition.   11/21/2022 glucerna 1.5 1000 gt    HEMAT.  .. DVT pplx.   11/21/2022 hpsc    .. anemia.  Hb 11/21-11/22-11/23-11/24-11/25-11/26-11/28-11/29-11/30/-12/1-12/2-12/3/2022   Hb 9.6- 7.8 - 7.7 - 7.7- 7.5 - 7.5  - 8.7 - 8.1 - 9.2 - 7.8 - 9.1 - 8.3   mcv 11/21/2022 mcv 90   inr 11/21/2022 inr 122   monitor target Hb 7 (+)   RENAL.  .. Hyperkalemia .  k 11/26-11/27-11/29/2022 k 5.7 - 6 - 4.2    11/26/2022 lokelma dose   11/27/2022 lokelma 10.3x2d   ENDOCRINE.   .. DM   11/21/2022 glarg 8 q am   11/21/2022 riss   NEURO.  .. seizures.  11/21/2022 lorazepam 1.6     TIME SPENT   Over 39 minutes aggregate critical care time spent on encounter; activities included   direct patient care, counseling and/or coordinating care reviewing notes, lab data/ imaging , discussion with multidisciplinary team/ patient  /family and explaining in detail risks, benefits, alternatives  of the recommendations     CHAPINCITO GUIDRY 76 f [facility] S 11/21/2022   DR JOSEPH DU

## 2022-12-03 NOTE — PROGRESS NOTE ADULT - SUBJECTIVE AND OBJECTIVE BOX
BREA BECKHAM     [facility] 03    Allergies    codeine (Hives)    Intolerances        PAST MEDICAL & SURGICAL HISTORY:  Dementia of frontal lobe type      Aphasic stroke      Diabetes mellitus      Respiratory failure      Hypertension      GERD (gastroesophageal reflux disease)      Constipation      Respiratory failure      CVA (cerebral vascular accident)      HTN (hypertension)      DM (diabetes mellitus)      Advanced dementia      COVID-19 virus detected      Quadriplegia      Pneumonia      Type II diabetes mellitus      Hx of appendectomy      Gastrostomy in place      Tracheostomy in place      Tracheostomy tube present      Feeding by G-tube          FAMILY HISTORY:  No pertinent family history in first degree relatives        Home Medications:  Admelog 100 units/mL injectable solution: injectable every 6 hours SS (21 Nov 2022 05:08)  albuterol 90 mcg/inh inhalation aerosol with adapter: 2  inhaled every 6 hours (21 Nov 2022 04:24)  Bacid (LAC) oral tablet: 2 tab(s) by gastrostomy tube once a day (21 Nov 2022 04:24)  bacitracin 500 units/g topical ointment: Apply topically to affected area once a day to knees (21 Nov 2022 04:24)  chlorhexidine 0.12% mucous membrane liquid: 15 milliliter(s) mucous membrane 2 times a day (21 Nov 2022 04:24)  Dakins Full Strength 0.5% topical solution: Apply topically to affected area 2 times a day then apply santyl and calcium alginate (21 Nov 2022 04:24)  Eucerin topical cream: Apply topically to affected area once a day bilateral feet (21 Nov 2022 04:24)  ferrous sulfate 325 mg (65 mg elemental iron) oral tablet: 1 tab(s) by gastrostomy tube once a day (21 Nov 2022 04:24)  insulin glargine 100 units/mL subcutaneous solution: 8 unit(s) subcutaneous once a day (in the morning) (21 Nov 2022 04:24)  ipratropium-albuterol 0.5 mg-2.5 mg/3 mL inhalation solution: 3 milliliter(s) inhaled every 6 hours (21 Nov 2022 04:24)  LORazepam 1 mg oral tablet: 1 tab(s) by gastrostomy tube every 4 hours (21 Nov 2022 04:24)  midodrine 10 mg oral tablet: 1 tab(s) by gastrostomy tube every 8 hours (21 Nov 2022 04:24)  MiraLax oral powder for reconstitution: orally once a day (at bedtime) (21 Nov 2022 05:08)  mulivitamin: by gastrostomy tube once a day (21 Nov 2022 04:24)  nystatin 100,000 units/g topical powder: 1 application topically 3 times a day (21 Nov 2022 04:24)  omeprazole 40 mg oral delayed release capsule: 1 cap(s) by gastrostomy tube 2 times a day (21 Nov 2022 04:24)  polyethylene glycol 3350 oral powder for reconstitution: 17 gram(s) by gastrostomy tube every 12 hours (21 Nov 2022 04:24)  senna 8.6 mg oral tablet: 3 tab(s) by gastrostomy tube once a day (at bedtime) (21 Nov 2022 04:24)  simethicone 80 mg oral tablet, chewable: 1 tab(s) by gastrostomy tube every 6 hours (21 Nov 2022 04:24)  sucralfate 1 g/10 mL oral suspension: 10 milliliter(s) g-tube 4 times a day (before meals and at bedtime) (21 Nov 2022 04:24)  Tylenol 325 mg oral tablet: 2 tab(s) orally every 6 hours, As Needed prior to dressing change (21 Nov 2022 04:24)      MEDICATIONS  (STANDING):  albuterol    90 MICROgram(s) HFA Inhaler 2 Puff(s) Inhalation two times a day  chlorhexidine 2% Cloths 1 Application(s) Topical daily  dextrose 5%. 1000 milliLiter(s) (100 mL/Hr) IV Continuous <Continuous>  dextrose 5%. 1000 milliLiter(s) (50 mL/Hr) IV Continuous <Continuous>  dextrose 50% Injectable 25 Gram(s) IV Push once  dextrose 50% Injectable 12.5 Gram(s) IV Push once  dextrose 50% Injectable 25 Gram(s) IV Push once  glucagon  Injectable 1 milliGRAM(s) IntraMuscular once  heparin   Injectable 5000 Unit(s) SubCutaneous every 12 hours  insulin glargine Injectable (LANTUS) 8 Unit(s) SubCutaneous every morning  insulin lispro (ADMELOG) corrective regimen sliding scale   SubCutaneous every 6 hours  LORazepam     Tablet 1 milliGRAM(s) Oral every 4 hours  midodrine 15 milliGRAM(s) Oral every 8 hours  norepinephrine Infusion 0.05 MICROgram(s)/kG/Min (5.35 mL/Hr) IV Continuous <Continuous>  pantoprazole  Injectable 40 milliGRAM(s) IV Push daily  polyethylene glycol 3350 17 Gram(s) Oral every 12 hours  povidone iodine 10% Solution 1 Application(s) Topical daily  senna 2 Tablet(s) Oral at bedtime    MEDICATIONS  (PRN):  dextrose Oral Gel 15 Gram(s) Oral once PRN Blood Glucose LESS THAN 70 milliGRAM(s)/deciliter  LORazepam   Injectable 2 milliGRAM(s) IV Push every 6 hours PRN Agitation  sodium chloride 0.9% lock flush 10 milliLiter(s) IV Push every 1 hour PRN Pre/post blood products, medications, blood draw, and to maintain line patency      Diet, NPO with Tube Feed:   Tube Feeding Modality: Gastrostomy  Glucerna 1.5 Horacio  Total Volume for 24 Hours (mL): 1000  Continuous  Starting Tube Feed Rate mL per Hour: 10  Increase Tube Feed Rate by (mL): 10     Every 10 hours  Until Goal Tube Feed Rate (mL per Hour): 50  Tube Feed Duration (in Hours): 20  Tube Feed Start Time: 17:00  Free Water Flush  Pump   Rate (mL per Hour): 25   Frequency: Every Hour    Duration (Hours): 24 (11-21-22 @ 05:10) [Active]          Vital Signs Last 24 Hrs  T(C): 36.4 (03 Dec 2022 05:24), Max: 37.4 (02 Dec 2022 12:09)  T(F): 97.5 (03 Dec 2022 05:24), Max: 99.4 (02 Dec 2022 12:09)  HR: 72 (03 Dec 2022 07:22) (57 - 774)  BP: 178/76 (03 Dec 2022 06:00) (94/53 - 181/82)  BP(mean): 103 (03 Dec 2022 06:00) (67 - 110)  RR: 32 (03 Dec 2022 06:00) (14 - 35)  SpO2: 100% (03 Dec 2022 07:22) (93% - 100%)    Parameters below as of 03 Dec 2022 06:00  Patient On (Oxygen Delivery Method): ventilator          12-02-22 @ 07:01  -  12-03-22 @ 07:00  --------------------------------------------------------  IN: 1755 mL / OUT: 75 mL / NET: 1680 mL        Mode: AC/ CMV (Assist Control/ Continuous Mandatory Ventilation), RR (machine): 18, TV (machine): 350, FiO2: 100, PEEP: 8, ITime: 1, MAP: 20, PIP: 50      LABS:                        8.3    8.87  )-----------( 185      ( 03 Dec 2022 06:27 )             28.1     12-03    141  |  103  |  22  ----------------------------<  131<H>  4.7   |  30  |  0.94    Ca    8.1<L>      03 Dec 2022 06:27  Phos  3.9     12-03  Mg     2.3     12-03    TPro  7.6  /  Alb  1.8<L>  /  TBili  0.4  /  DBili  x   /  AST  20  /  ALT  <10<L>  /  AlkPhos  168<H>  12-02              WBC:  WBC Count: 8.87 K/uL (12-03 @ 06:27)  WBC Count: 8.74 K/uL (12-02 @ 07:25)  WBC Count: 5.85 K/uL (12-01 @ 12:00)  WBC Count: 5.51 K/uL (12-01 @ 05:58)  WBC Count: 12.90 K/uL (11-30 @ 05:55)      MICROBIOLOGY:  RECENT CULTURES:                  Sodium:  Sodium, Serum: 141 mmol/L (12-03 @ 06:27)  Sodium, Serum: 140 mmol/L (12-02 @ 07:25)  Sodium, Serum: 141 mmol/L (12-01 @ 05:58)  Sodium, Serum: 139 mmol/L (11-30 @ 05:55)      0.94 mg/dL 12-03 @ 06:27  1.08 mg/dL 12-02 @ 07:25  1.05 mg/dL 12-01 @ 05:58  1.14 mg/dL 11-30 @ 05:55      Hemoglobin:  Hemoglobin: 8.3 g/dL (12-03 @ 06:27)  Hemoglobin: 9.1 g/dL (12-02 @ 07:25)  Hemoglobin: 7.8 g/dL (12-01 @ 12:00)  Hemoglobin: 7.3 g/dL (12-01 @ 05:58)  Hemoglobin: 9.2 g/dL (11-30 @ 05:55)      Platelets: Platelet Count - Automated: 185 K/uL (12-03 @ 06:27)  Platelet Count - Automated: 214 K/uL (12-02 @ 07:25)  Platelet Count - Automated: 157 K/uL (12-01 @ 12:00)  Platelet Count - Automated: 147 K/uL (12-01 @ 05:58)  Platelet Count - Automated: 175 K/uL (11-30 @ 05:55)      LIVER FUNCTIONS - ( 02 Dec 2022 07:25 )  Alb: 1.8 g/dL / Pro: 7.6 g/dL / ALK PHOS: 168 U/L / ALT: <10 U/L DA / AST: 20 U/L / GGT: x                 RADIOLOGY & ADDITIONAL STUDIES:      MICROBIOLOGY:  RECENT CULTURES:

## 2022-12-04 LAB
ALBUMIN SERPL ELPH-MCNC: 1.7 G/DL — LOW (ref 3.3–5)
ALP SERPL-CCNC: 171 U/L — HIGH (ref 30–120)
ALT FLD-CCNC: 13 U/L DA — SIGNIFICANT CHANGE UP (ref 10–60)
ANION GAP SERPL CALC-SCNC: 3 MMOL/L — LOW (ref 5–17)
AST SERPL-CCNC: 12 U/L — SIGNIFICANT CHANGE UP (ref 10–40)
BILIRUB SERPL-MCNC: 0.5 MG/DL — SIGNIFICANT CHANGE UP (ref 0.2–1.2)
BUN SERPL-MCNC: 22 MG/DL — SIGNIFICANT CHANGE UP (ref 7–23)
CALCIUM SERPL-MCNC: 8.7 MG/DL — SIGNIFICANT CHANGE UP (ref 8.4–10.5)
CHLORIDE SERPL-SCNC: 103 MMOL/L — SIGNIFICANT CHANGE UP (ref 96–108)
CO2 SERPL-SCNC: 33 MMOL/L — HIGH (ref 22–31)
CREAT SERPL-MCNC: 1.03 MG/DL — SIGNIFICANT CHANGE UP (ref 0.5–1.3)
EGFR: 55 ML/MIN/1.73M2 — LOW
GLUCOSE SERPL-MCNC: 160 MG/DL — HIGH (ref 70–99)
HCT VFR BLD CALC: 29.1 % — LOW (ref 34.5–45)
HGB BLD-MCNC: 8.6 G/DL — LOW (ref 11.5–15.5)
MAGNESIUM SERPL-MCNC: 2.4 MG/DL — SIGNIFICANT CHANGE UP (ref 1.6–2.6)
MCHC RBC-ENTMCNC: 27.8 PG — SIGNIFICANT CHANGE UP (ref 27–34)
MCHC RBC-ENTMCNC: 29.6 GM/DL — LOW (ref 32–36)
MCV RBC AUTO: 94.2 FL — SIGNIFICANT CHANGE UP (ref 80–100)
NRBC # BLD: 0 /100 WBCS — SIGNIFICANT CHANGE UP (ref 0–0)
PHOSPHATE SERPL-MCNC: 4 MG/DL — SIGNIFICANT CHANGE UP (ref 2.5–4.5)
PLATELET # BLD AUTO: 184 K/UL — SIGNIFICANT CHANGE UP (ref 150–400)
POTASSIUM SERPL-MCNC: 4.7 MMOL/L — SIGNIFICANT CHANGE UP (ref 3.5–5.3)
POTASSIUM SERPL-SCNC: 4.7 MMOL/L — SIGNIFICANT CHANGE UP (ref 3.5–5.3)
PROT SERPL-MCNC: 7.7 G/DL — SIGNIFICANT CHANGE UP (ref 6–8.3)
RBC # BLD: 3.09 M/UL — LOW (ref 3.8–5.2)
RBC # FLD: 16.2 % — HIGH (ref 10.3–14.5)
SODIUM SERPL-SCNC: 139 MMOL/L — SIGNIFICANT CHANGE UP (ref 135–145)
WBC # BLD: 14.63 K/UL — HIGH (ref 3.8–10.5)
WBC # FLD AUTO: 14.63 K/UL — HIGH (ref 3.8–10.5)

## 2022-12-04 PROCEDURE — 99232 SBSQ HOSP IP/OBS MODERATE 35: CPT

## 2022-12-04 RX ORDER — FENTANYL CITRATE 50 UG/ML
50 INJECTION INTRAVENOUS EVERY 6 HOURS
Refills: 0 | Status: DISCONTINUED | OUTPATIENT
Start: 2022-12-04 | End: 2022-12-08

## 2022-12-04 RX ORDER — CHLORHEXIDINE GLUCONATE 213 G/1000ML
15 SOLUTION TOPICAL EVERY 12 HOURS
Refills: 0 | Status: DISCONTINUED | OUTPATIENT
Start: 2022-12-04 | End: 2022-12-08

## 2022-12-04 RX ORDER — ALTEPLASE 100 MG
2 KIT INTRAVENOUS ONCE
Refills: 0 | Status: COMPLETED | OUTPATIENT
Start: 2022-12-04 | End: 2022-12-04

## 2022-12-04 RX ORDER — SODIUM CHLORIDE 9 MG/ML
250 INJECTION, SOLUTION INTRAVENOUS ONCE
Refills: 0 | Status: COMPLETED | OUTPATIENT
Start: 2022-12-04 | End: 2022-12-04

## 2022-12-04 RX ADMIN — FENTANYL CITRATE 50 MICROGRAM(S): 50 INJECTION INTRAVENOUS at 08:46

## 2022-12-04 RX ADMIN — ALTEPLASE 2 MILLIGRAM(S): KIT at 21:38

## 2022-12-04 RX ADMIN — Medication 1 MILLIGRAM(S): at 01:02

## 2022-12-04 RX ADMIN — Medication 1 MILLIGRAM(S): at 10:21

## 2022-12-04 RX ADMIN — PANTOPRAZOLE SODIUM 40 MILLIGRAM(S): 20 TABLET, DELAYED RELEASE ORAL at 11:53

## 2022-12-04 RX ADMIN — ALBUTEROL 2 PUFF(S): 90 AEROSOL, METERED ORAL at 07:20

## 2022-12-04 RX ADMIN — CHLORHEXIDINE GLUCONATE 15 MILLILITER(S): 213 SOLUTION TOPICAL at 17:31

## 2022-12-04 RX ADMIN — MIDODRINE HYDROCHLORIDE 15 MILLIGRAM(S): 2.5 TABLET ORAL at 05:37

## 2022-12-04 RX ADMIN — POLYETHYLENE GLYCOL 3350 17 GRAM(S): 17 POWDER, FOR SOLUTION ORAL at 17:32

## 2022-12-04 RX ADMIN — CHLORHEXIDINE GLUCONATE 1 APPLICATION(S): 213 SOLUTION TOPICAL at 05:32

## 2022-12-04 RX ADMIN — Medication 2 MILLIGRAM(S): at 11:53

## 2022-12-04 RX ADMIN — ALBUTEROL 2 PUFF(S): 90 AEROSOL, METERED ORAL at 20:39

## 2022-12-04 RX ADMIN — FENTANYL CITRATE 50 MICROGRAM(S): 50 INJECTION INTRAVENOUS at 21:40

## 2022-12-04 RX ADMIN — Medication 1 MILLIGRAM(S): at 14:16

## 2022-12-04 RX ADMIN — FENTANYL CITRATE 50 MICROGRAM(S): 50 INJECTION INTRAVENOUS at 21:55

## 2022-12-04 RX ADMIN — FENTANYL CITRATE 50 MICROGRAM(S): 50 INJECTION INTRAVENOUS at 01:51

## 2022-12-04 RX ADMIN — FENTANYL CITRATE 50 MICROGRAM(S): 50 INJECTION INTRAVENOUS at 02:05

## 2022-12-04 RX ADMIN — SODIUM CHLORIDE 750 MILLILITER(S): 9 INJECTION, SOLUTION INTRAVENOUS at 21:40

## 2022-12-04 RX ADMIN — MIDODRINE HYDROCHLORIDE 15 MILLIGRAM(S): 2.5 TABLET ORAL at 14:16

## 2022-12-04 RX ADMIN — ALTEPLASE 2 MILLIGRAM(S): KIT at 21:39

## 2022-12-04 RX ADMIN — MIDODRINE HYDROCHLORIDE 15 MILLIGRAM(S): 2.5 TABLET ORAL at 21:39

## 2022-12-04 RX ADMIN — HEPARIN SODIUM 5000 UNIT(S): 5000 INJECTION INTRAVENOUS; SUBCUTANEOUS at 05:38

## 2022-12-04 RX ADMIN — Medication 1 APPLICATION(S): at 12:23

## 2022-12-04 RX ADMIN — Medication 2: at 12:00

## 2022-12-04 RX ADMIN — Medication 2 MILLIGRAM(S): at 03:18

## 2022-12-04 RX ADMIN — Medication 1 MILLIGRAM(S): at 05:37

## 2022-12-04 RX ADMIN — HEPARIN SODIUM 5000 UNIT(S): 5000 INJECTION INTRAVENOUS; SUBCUTANEOUS at 17:30

## 2022-12-04 RX ADMIN — Medication 2: at 05:49

## 2022-12-04 RX ADMIN — Medication 2: at 00:10

## 2022-12-04 RX ADMIN — Medication 1 MILLIGRAM(S): at 17:30

## 2022-12-04 RX ADMIN — POLYETHYLENE GLYCOL 3350 17 GRAM(S): 17 POWDER, FOR SOLUTION ORAL at 05:38

## 2022-12-04 RX ADMIN — INSULIN GLARGINE 8 UNIT(S): 100 INJECTION, SOLUTION SUBCUTANEOUS at 08:53

## 2022-12-04 RX ADMIN — Medication 1 MILLIGRAM(S): at 21:39

## 2022-12-04 NOTE — PROGRESS NOTE ADULT - SUBJECTIVE AND OBJECTIVE BOX
INTERVAL HPI/OVERNIGHT EVENTS:  No new overnight event.  No N/V/D.  Tolerating diet. some smell near the peg site  Allergies    codeine (Hives)    Intolerances    General:  No wt loss, fevers, chills, night sweats, fatigue,   Eyes:  Good vision, no reported pain  ENT:  No sore throat, pain, runny nose, dysphagia  CV:  No pain, palpitations, hypo/hypertension  Resp:  No dyspnea, cough, tachypnea, wheezing  GI:  No pain, No nausea, No vomiting, No diarrhea, No constipation, No weight loss, No fever, No pruritis, No rectal bleeding, No tarry stools, No dysphagia,  :  No pain, bleeding, incontinence, nocturia  Muscle:  No pain, weakness  Neuro:  No weakness, tingling, memory problems  Psych:  No fatigue, insomnia, mood problems, depression  Endocrine:  No polyuria, polydipsia, cold/heat intolerance  Heme:  No petechiae, ecchymosis, easy bruisability  Skin:  No rash, tattoos, scars, edema      PHYSICAL EXAM:   Vital Signs:  Vital Signs Last 24 Hrs  T(C): 36.1 (04 Dec 2022 15:41), Max: 36.8 (04 Dec 2022 08:30)  T(F): 97 (04 Dec 2022 15:41), Max: 98.3 (04 Dec 2022 08:30)  HR: 74 (04 Dec 2022 18:00) (65 - 83)  BP: 131/62 (04 Dec 2022 18:00) (98/51 - 145/73)  BP(mean): 84 (04 Dec 2022 18:00) (66 - 98)  RR: 26 (04 Dec 2022 18:00) (18 - 37)  SpO2: 100% (04 Dec 2022 18:00) (97% - 100%)    Parameters below as of 04 Dec 2022 07:21  Patient On (Oxygen Delivery Method): ventilator,100      Daily     Daily Weight in k.5 (04 Dec 2022 08:30)I&O's Summary    03 Dec 2022 07:01  -  04 Dec 2022 07:00  --------------------------------------------------------  IN: 1100 mL / OUT: 300 mL / NET: 800 mL        GENERAL:  Appears stated age, well-groomed, well-nourished, no distress  HEENT:  NC/AT,  conjunctivae clear and pink, no thyromegaly, nodules, adenopathy, no JVD, sclera -anicteric  CHEST:  Full & symmetric excursion, no increased effort, breath sounds clear  HEART:  Regular rhythm, S1, S2, no murmur/rub/S3/S4, no abdominal bruit, no edema  ABDOMEN:  Soft, non-tender, non-distended, normoactive bowel sounds,  no masses ,no hepato-splenomegaly, no signs of chronic liver disease  EXTEREMITIES:  no cyanosis,clubbing or edema  SKIN:  No rash/erythema/ecchymoses/petechiae/wounds/abscess/warm/dry  NEURO:  Alert, oriented, no asterixis, no tremor, no encephalopathy      LABS:                        8.6    14.63 )-----------( 184      ( 04 Dec 2022 05:45 )             29.1     12-    139  |  103  |  22  ----------------------------<  160<H>  4.7   |  33<H>  |  1.03    Ca    8.7      04 Dec 2022 05:45  Phos  4.0     12-  Mg     2.4     12-    TPro  7.7  /  Alb  1.7<L>  /  TBili  0.5  /  DBili  x   /  AST  12  /  ALT  13  /  AlkPhos  171<H>  12-04        amylase   lipase  RADIOLOGY & ADDITIONAL TESTS:

## 2022-12-04 NOTE — PROGRESS NOTE ADULT - SUBJECTIVE AND OBJECTIVE BOX
Date/Time Patient Seen:  		  Referring MD:   Data Reviewed	       Patient is a 77y old  Female who presents with a chief complaint of diaphoresis and fever (03 Dec 2022 14:43)      Subjective/HPI     PAST MEDICAL & SURGICAL HISTORY:  Dementia of frontal lobe type    Aphasic stroke    Diabetes mellitus    Respiratory failure    Hypertension    GERD (gastroesophageal reflux disease)    Constipation    Respiratory failure    CVA (cerebral vascular accident)    HTN (hypertension)    DM (diabetes mellitus)    Advanced dementia    COVID-19 virus detected    Quadriplegia    Pneumonia    Type II diabetes mellitus    Hx of appendectomy    Gastrostomy in place    Tracheostomy in place    Tracheostomy tube present    Feeding by G-tube          Medication list         MEDICATIONS  (STANDING):  albuterol    90 MICROgram(s) HFA Inhaler 2 Puff(s) Inhalation two times a day  chlorhexidine 2% Cloths 1 Application(s) Topical daily  dextrose 5%. 1000 milliLiter(s) (100 mL/Hr) IV Continuous <Continuous>  dextrose 5%. 1000 milliLiter(s) (50 mL/Hr) IV Continuous <Continuous>  dextrose 50% Injectable 25 Gram(s) IV Push once  dextrose 50% Injectable 12.5 Gram(s) IV Push once  dextrose 50% Injectable 25 Gram(s) IV Push once  glucagon  Injectable 1 milliGRAM(s) IntraMuscular once  heparin   Injectable 5000 Unit(s) SubCutaneous every 12 hours  insulin glargine Injectable (LANTUS) 8 Unit(s) SubCutaneous every morning  insulin lispro (ADMELOG) corrective regimen sliding scale   SubCutaneous every 6 hours  LORazepam     Tablet 1 milliGRAM(s) Oral every 4 hours  midodrine 15 milliGRAM(s) Oral every 8 hours  norepinephrine Infusion 0.05 MICROgram(s)/kG/Min (5.35 mL/Hr) IV Continuous <Continuous>  pantoprazole  Injectable 40 milliGRAM(s) IV Push daily  polyethylene glycol 3350 17 Gram(s) Oral every 12 hours  povidone iodine 10% Solution 1 Application(s) Topical daily  senna 2 Tablet(s) Oral at bedtime    MEDICATIONS  (PRN):  dextrose Oral Gel 15 Gram(s) Oral once PRN Blood Glucose LESS THAN 70 milliGRAM(s)/deciliter  fentaNYL    Injectable 50 MICROGram(s) IV Push every 6 hours PRN Tachypnea and Vent dyssynchrony  LORazepam   Injectable 2 milliGRAM(s) IV Push every 6 hours PRN Agitation  sodium chloride 0.9% lock flush 10 milliLiter(s) IV Push every 1 hour PRN Pre/post blood products, medications, blood draw, and to maintain line patency         Vitals log        ICU Vital Signs Last 24 Hrs  T(C): 36.3 (04 Dec 2022 00:00), Max: 36.7 (03 Dec 2022 15:49)  T(F): 97.4 (04 Dec 2022 00:00), Max: 98 (03 Dec 2022 15:49)  HR: 81 (04 Dec 2022 06:31) (71 - 83)  BP: 127/65 (04 Dec 2022 06:00) (103/59 - 145/73)  BP(mean): 83 (04 Dec 2022 06:00) (73 - 104)  ABP: --  ABP(mean): --  RR: 36 (04 Dec 2022 06:00) (14 - 44)  SpO2: 97% (04 Dec 2022 06:31) (93% - 100%)    O2 Parameters below as of 04 Dec 2022 04:00  Patient On (Oxygen Delivery Method): ventilator    O2 Concentration (%): 100         Mode: AC/ CMV (Assist Control/ Continuous Mandatory Ventilation)  RR (machine): 18  TV (machine): 350  FiO2: 100  PEEP: 8  ITime: 1  MAP: 19  PIP: 45      Input and Output:  I&O's Detail    02 Dec 2022 07:01  -  03 Dec 2022 07:00  --------------------------------------------------------  IN:    Enteral Tube Flush: 335 mL    Free Water: 220 mL    Glucerna 1.5: 1200 mL  Total IN: 1755 mL    OUT:    Incontinent per Collection Bag (mL): 75 mL    Norepinephrine: 0 mL  Total OUT: 75 mL    Total NET: 1680 mL      03 Dec 2022 07:01  -  04 Dec 2022 06:47  --------------------------------------------------------  IN:    Enteral Tube Flush: 250 mL    Free Water: 250 mL    Glucerna 1.5: 600 mL  Total IN: 1100 mL    OUT:    Incontinent per Collection Bag (mL): 300 mL    Norepinephrine: 0 mL  Total OUT: 300 mL    Total NET: 800 mL          Lab Data                        8.6    14.63 )-----------( 184      ( 04 Dec 2022 05:45 )             29.1     12-03    141  |  103  |  22  ----------------------------<  131<H>  4.7   |  30  |  0.94    Ca    8.1<L>      03 Dec 2022 06:27  Phos  3.9     12-03  Mg     2.3     12-03    TPro  7.6  /  Alb  1.8<L>  /  TBili  0.4  /  DBili  x   /  AST  20  /  ALT  <10<L>  /  AlkPhos  168<H>  12-02            Review of Systems	      Objective     Physical Examination    heart s1s2  lung dc BS      Pertinent Lab findings & Imaging      Annalise:  NO   Adequate UO     I&O's Detail    02 Dec 2022 07:01  -  03 Dec 2022 07:00  --------------------------------------------------------  IN:    Enteral Tube Flush: 335 mL    Free Water: 220 mL    Glucerna 1.5: 1200 mL  Total IN: 1755 mL    OUT:    Incontinent per Collection Bag (mL): 75 mL    Norepinephrine: 0 mL  Total OUT: 75 mL    Total NET: 1680 mL      03 Dec 2022 07:01  -  04 Dec 2022 06:47  --------------------------------------------------------  IN:    Enteral Tube Flush: 250 mL    Free Water: 250 mL    Glucerna 1.5: 600 mL  Total IN: 1100 mL    OUT:    Incontinent per Collection Bag (mL): 300 mL    Norepinephrine: 0 mL  Total OUT: 300 mL    Total NET: 800 mL               Discussed with:     Cultures:	        Radiology

## 2022-12-04 NOTE — PROGRESS NOTE ADULT - ASSESSMENT
77F with dementia, COPD with chronic respiratory failure s/p trach, cardiac arrest, CHH, quadriplegia, CVA, CKD, anemia, PEG tube, and multiple hospital admissions for respiratory distress presents to the ED BIBEMS from [facility] for diaphoresis and fever.       Fentanyl added for pain PRN use  ativan  s/p IVF  vent support    GOC   pt is full code   is full code  assist with needs -   oral hygiene  skin care  recurrent admissions  long term resident of SNF  vent dep  anoxic brain injury - dementia - vegetative state

## 2022-12-04 NOTE — PROGRESS NOTE ADULT - SUBJECTIVE AND OBJECTIVE BOX
Patient is a 77y Female with a known history of :    HPI:  77F with dementia, COPD with chronic respiratory failure s/p trach, cardiac arrest, CHH, quadriplegia, CVA, CKD, anemia, PEG tube, and multiple hospital admissions for respiratory distress presents to the ED [facility] from [facility] for diaphoresis and fever.   Patient unable to provide any history.  Patient was just discharged here from 11/12-11/16 for low grade fever, midline malfunction, leukocytosis, fever, concerning for sepsis possibly 2/2 unresolved VAP.  Was treated with meropenem.  Per notes from [facility], around 3am, patient was does "not look good" and was noted to be diaphoretic, tachypneic, and febrile to 100.9F.  Was sent here for further evaluation.  In the ED, patient was febrile to 101.5F and tachycardic to 112.  Labs showed leukocytosis of 16 (up from 6.5), hyperkalemia 5.4, ABG 7.59/36/273.  Patient is being readmitted for sepsis 2/2 presumably from VAP.   (21 Nov 2022 04:55)      REVIEW OF SYSTEMS:    CONSTITUTIONAL: No fever, weight loss, or fatigue  EYES: No eye pain, visual disturbances, or discharge  ENMT:  No difficulty hearing, tinnitus, vertigo; No sinus or throat pain  NECK: No pain or stiffness  BREASTS: No pain, masses, or nipple discharge  RESPIRATORY: No cough, wheezing, chills or hemoptysis; No shortness of breath  CARDIOVASCULAR: No chest pain, palpitations, dizziness, or leg swelling  GASTROINTESTINAL: No abdominal or epigastric pain. No nausea, vomiting, or hematemesis; No diarrhea or constipation. No melena or hematochezia.  GENITOURINARY: No dysuria, frequency, hematuria, or incontinence  NEUROLOGICAL: No headaches, memory loss, loss of strength, numbness, or tremors  SKIN: No itching, burning, rashes, or lesions   LYMPH NODES: No enlarged glands  ENDOCRINE: No heat or cold intolerance; No hair loss  MUSCULOSKELETAL: No joint pain or swelling; No muscle, back, or extremity pain  PSYCHIATRIC: No depression, anxiety, mood swings, or difficulty sleeping  HEME/LYMPH: No easy bruising, or bleeding gums  ALLERGY AND IMMUNOLOGIC: No hives or eczema    MEDICATIONS  (STANDING):  albuterol    90 MICROgram(s) HFA Inhaler 2 Puff(s) Inhalation two times a day  chlorhexidine 0.12% Liquid 15 milliLiter(s) Oral Mucosa every 12 hours  chlorhexidine 2% Cloths 1 Application(s) Topical daily  dextrose 5%. 1000 milliLiter(s) (100 mL/Hr) IV Continuous <Continuous>  dextrose 5%. 1000 milliLiter(s) (50 mL/Hr) IV Continuous <Continuous>  dextrose 50% Injectable 25 Gram(s) IV Push once  dextrose 50% Injectable 12.5 Gram(s) IV Push once  dextrose 50% Injectable 25 Gram(s) IV Push once  glucagon  Injectable 1 milliGRAM(s) IntraMuscular once  heparin   Injectable 5000 Unit(s) SubCutaneous every 12 hours  insulin glargine Injectable (LANTUS) 8 Unit(s) SubCutaneous every morning  insulin lispro (ADMELOG) corrective regimen sliding scale   SubCutaneous every 6 hours  LORazepam     Tablet 1 milliGRAM(s) Oral every 4 hours  midodrine 15 milliGRAM(s) Oral every 8 hours  norepinephrine Infusion 0.05 MICROgram(s)/kG/Min (5.35 mL/Hr) IV Continuous <Continuous>  pantoprazole  Injectable 40 milliGRAM(s) IV Push daily  polyethylene glycol 3350 17 Gram(s) Oral every 12 hours  povidone iodine 10% Solution 1 Application(s) Topical daily  senna 2 Tablet(s) Oral at bedtime    MEDICATIONS  (PRN):  dextrose Oral Gel 15 Gram(s) Oral once PRN Blood Glucose LESS THAN 70 milliGRAM(s)/deciliter  fentaNYL    Injectable 50 MICROGram(s) IV Push every 6 hours PRN Tachypnea and Vent dyssynchrony  LORazepam   Injectable 2 milliGRAM(s) IV Push every 6 hours PRN Agitation  sodium chloride 0.9% lock flush 10 milliLiter(s) IV Push every 1 hour PRN Pre/post blood products, medications, blood draw, and to maintain line patency      ALLERGIES: codeine (Hives)      FAMILY HISTORY:  No pertinent family history in first degree relatives        Social history:  Alochol:   Smoking:   Drug Use:   Marital Status:     PHYSICAL EXAMINATION:  -----------------------------  T(C): 36.3 (12-04-22 @ 00:00), Max: 36.7 (12-03-22 @ 15:49)  HR: 77 (12-04-22 @ 09:00) (72 - 83)  BP: 110/57 (12-04-22 @ 09:00) (103/59 - 145/73)  RR: 25 (12-04-22 @ 09:00) (14 - 44)  SpO2: 100% (12-04-22 @ 09:00) (93% - 100%)  Wt(kg): --    12-03 @ 07:01  -  12-04 @ 07:00  --------------------------------------------------------  IN:    Enteral Tube Flush: 250 mL    Free Water: 250 mL    Glucerna 1.5: 600 mL  Total IN: 1100 mL    OUT:    Incontinent per Collection Bag (mL): 300 mL    Norepinephrine: 0 mL  Total OUT: 300 mL    Total NET: 800 mL            Constitutional: well developed, normal appearance, well groomed, well nourished, no deformities and no acute distress.   Eyes: the conjunctiva exhibited no abnormalities and the eyelids demonstrated no xanthelasmas.   HEENT: normal oral mucosa, no oral pallor and no oral cyanosis.   Neck: normal jugular venous A waves present, normal jugular venous V waves present and no jugular venous obregon A waves.   Pulmonary: no respiratory distress, normal respiratory rhythm and effort, no accessory muscle use and lungs were clear to auscultation bilaterally. B/L scattered rhonchi  Cardiovascular: heart rate and rhythm were normal, normal S1 and S2 and no murmur, gallop, rub, heave or thrill are present.   Musculoskeletal: the gait could not be assessed.   Extremities: no clubbing of the fingernails, no localized cyanosis, no petechial hemorrhages and no ischemic changes. B/L trace edema  Skin: normal skin color and pigmentation, no rash, no venous stasis, no skin lesions, no skin ulcer and no xanthoma was observed.       LABS:   --------  12-04    139  |  103  |  22  ----------------------------<  160<H>  4.7   |  33<H>  |  1.03    Ca    8.7      04 Dec 2022 05:45  Phos  4.0     12-04  Mg     2.4     12-04    TPro  7.7  /  Alb  1.7<L>  /  TBili  0.5  /  DBili  x   /  AST  12  /  ALT  13  /  AlkPhos  171<H>  12-04                         8.6    14.63 )-----------( 184      ( 04 Dec 2022 05:45 )             29.1                   Radiology:

## 2022-12-04 NOTE — PROGRESS NOTE ADULT - SUBJECTIVE AND OBJECTIVE BOX
Covering [facility] DIVISION of INFECTIOUS DISEASE  MOIZ Chun, SOLANGE Higgins G. Casimir PARASHARAMIBREA is a 77yFemale , patient examined and chart reviewed.     INTERVAL HPI/ OVERNIGHT EVENTS:  Vegetative state. Afebrile.  Chronic vent. No events  on 100% fi02.  No change in status.      Past Medical History--  PAST MEDICAL & SURGICAL HISTORY:  Dementia of frontal lobe type  Aphasic stroke  Diabetes mellitus  Respiratory failure  Hypertension  GERD (gastroesophageal reflux disease)  Constipation  Respiratory failure  CVA (cerebral vascular accident)  HTN (hypertension)  Advanced dementia  COVID-19 virus detected  Quadriplegia  Pneumoni  Type II diabetes mellitus  Hx of appendectomy  Gastrostomy in place  Tracheostomy in place        For details regarding the patient's social history, family history, and other miscellaneous elements, please refer the initial infectious diseases consultation and/or the admitting history and physical examination for this admission.      ROS:  Unable to obtain due to : pt's condition      ALLERGIES  codeine (Hives)      Current inpatient medications :    ANTIBIOTICS/RELEVANT:    MEDICATIONS  (STANDING):  albuterol    90 MICROgram(s) HFA Inhaler 2 Puff(s) Inhalation two times a day  chlorhexidine 0.12% Liquid 15 milliLiter(s) Oral Mucosa every 12 hours  chlorhexidine 2% Cloths 1 Application(s) Topical daily  dextrose 5%. 1000 milliLiter(s) (50 mL/Hr) IV Continuous <Continuous>  dextrose 5%. 1000 milliLiter(s) (100 mL/Hr) IV Continuous <Continuous>  dextrose 50% Injectable 25 Gram(s) IV Push once  dextrose 50% Injectable 12.5 Gram(s) IV Push once  dextrose 50% Injectable 25 Gram(s) IV Push once  glucagon  Injectable 1 milliGRAM(s) IntraMuscular once  heparin   Injectable 5000 Unit(s) SubCutaneous every 12 hours  insulin glargine Injectable (LANTUS) 8 Unit(s) SubCutaneous every morning  insulin lispro (ADMELOG) corrective regimen sliding scale   SubCutaneous every 6 hours  LORazepam     Tablet 1 milliGRAM(s) Oral every 4 hours  midodrine 15 milliGRAM(s) Oral every 8 hours  norepinephrine Infusion 0.05 MICROgram(s)/kG/Min (5.35 mL/Hr) IV Continuous <Continuous>  pantoprazole  Injectable 40 milliGRAM(s) IV Push daily  polyethylene glycol 3350 17 Gram(s) Oral every 12 hours  povidone iodine 10% Solution 1 Application(s) Topical daily  senna 2 Tablet(s) Oral at bedtime    MEDICATIONS  (PRN):  dextrose Oral Gel 15 Gram(s) Oral once PRN Blood Glucose LESS THAN 70 milliGRAM(s)/deciliter  fentaNYL    Injectable 50 MICROGram(s) IV Push every 6 hours PRN Tachypnea and Vent dyssynchrony  LORazepam   Injectable 2 milliGRAM(s) IV Push every 6 hours PRN Agitation  sodium chloride 0.9% lock flush 10 milliLiter(s) IV Push every 1 hour PRN Pre/post blood products, medications, blood draw, and to maintain line patency        Objective:  ICU Vital Signs Last 24 Hrs  T(C): 36.3 (04 Dec 2022 21:17), Max: 36.8 (04 Dec 2022 08:30)  T(F): 97.4 (04 Dec 2022 21:17), Max: 98.3 (04 Dec 2022 08:30)  HR: 70 (04 Dec 2022 20:53) (65 - 83)  BP: 122/65 (04 Dec 2022 20:00) (85/47 - 145/73)  BP(mean): 83 (04 Dec 2022 20:00) (61 - 98)  RR: 35 (04 Dec 2022 20:00) (18 - 37)  SpO2: 100% (04 Dec 2022 20:53) (97% - 100%)    O2 Parameters below as of 04 Dec 2022 20:53  Patient On (Oxygen Delivery Method): ventilator,100      Physical Exam:  GEN: Chronic vent vegetative state  HEENT: normocephalic and atraumatic.   NECK: Supple.   LUNGS: Decreased  to auscultation.  HEART: Regular rate and rhythm without murmur.  ABDOMEN: Soft, nontender, and nondistended.  Positive bowel sounds.  +G tube  EXTREMITIES: + edema.  NEUROLOGIC: Unresponsive      LABS:                        8.6    14.63 )-----------( 184      ( 04 Dec 2022 05:45 )             29.1   12-04    139  |  103  |  22  ----------------------------<  160<H>  4.7   |  33<H>  |  1.03    Ca    8.7      04 Dec 2022 05:45  Phos  4.0     12-04  Mg     2.4     12-04    TPro  7.7  /  Alb  1.7<L>  /  TBili  0.5  /  DBili  x   /  AST  12  /  ALT  13  /  AlkPhos  171<H>  12-04      MICROBIOLOGY:          RADIOLOGY & ADDITIONAL STUDIES:      Assessment :  77F with dementia, COPD with chronic respiratory failure s/p trach, cardiac arrest, CHH, quadriplegia, CVA, CKD, anemia, PEG tube, and multiple hospital admissions for respiratory distress presents to the ED BIBEMS from [facility] for diaphoresis and fever.     Readmitted with sepsis with shock  2/2 recurent VAP.    Sputum cx 11/21 with Acinetobacter baumannii/nosocom group (Carbapenem Resistant) resistant to Bactrim  Off pressors   Remains on 100% fi02- unable to be wean down   WBC 14K today    Plan:  Monitor off antibiotics  Sp Unasyn x 7 days ended 12/2/22  Trend temps and cbc  Asp precautions  Aggressive pulm toileting  Isolation per infection control policy  Vent management per ICU  Very poor prognosis  Continued GOC discussion with  per palliative team      Continue with present regiment.  Appropriate use of antibiotics and adverse effects reviewed.      Critical care time greater then 35 minutes reviewing notes, labs data/ imaging , discussion with multidisciplinary team.    Thank you for allowing me to participate in care of your patient .        Eusebio Chairez MD  Infectious Disease  [phone]

## 2022-12-04 NOTE — PROGRESS NOTE ADULT - ASSESSMENT
77F with dementia, COPD with chronic respiratory failure s/p trach, cardiac arrest, CHH, quadriplegia, CVA, CKD, anemia, PEG tube, and multiple hospital admissions for respiratory distress presents to the ED BIBEMS from [facility] for diaphoresis and fever.         COPD with chronic respiratory failure s/p trach  unable to wean off 100% O2  poor prognosis  GOC discussion needs to be revisited  pulmonary recs appreciated  CTA chest ordered, rule out PE or pleural effusions      ANemia  multifacotrial/ AICD   s/p 1 unit prbc since admission   H and H stable   continue supportive care    Sepsis 2/2 VAP - meets sepsis based on fever + tachycardia + source of infection.  Lactate 1.9  peristent hypotension  Completed unasyn 12/1/22  - cont with vent settings to maintain SaO2 >92%  - cont with midodrine 15mg TID, pressors PRN  - on ativan for agitation  - MRSA neg, trach culture +acinetobacter   wean as tolerating   Trend temps and cbc    Hyperkalemia  resolved  s/p lokelma     Hyponatremia  - Na of 130 today, likely hypovolemic  - urine lytes ordered  - continue to trend    Leukocytosis  - WBC WNL  - no fevers  comleted unasyn  - continue to trend     DM2 on insulin  - cont with lantus and insulin coverage scale with FS q6h while on TF    COPD   - cont with neb treatments    Anemia of chronic disease   - GI eval - conservative management   - cont with ferrous sulfate  - trend CBC    Prognosis grave, patient remains critically ill and is at high risk for abrupt decompensation and death

## 2022-12-04 NOTE — PROGRESS NOTE ADULT - SUBJECTIVE AND OBJECTIVE BOX
BREA BECKHAM     [facility] 03    Allergies    codeine (Hives)    Intolerances        PAST MEDICAL & SURGICAL HISTORY:  Dementia of frontal lobe type      Aphasic stroke      Diabetes mellitus      Respiratory failure      Hypertension      GERD (gastroesophageal reflux disease)      Constipation      Respiratory failure      CVA (cerebral vascular accident)      HTN (hypertension)      DM (diabetes mellitus)      Advanced dementia      COVID-19 virus detected      Quadriplegia      Pneumonia      Type II diabetes mellitus      Hx of appendectomy      Gastrostomy in place      Tracheostomy in place      Tracheostomy tube present      Feeding by G-tube          FAMILY HISTORY:  No pertinent family history in first degree relatives        Home Medications:  Admelog 100 units/mL injectable solution: injectable every 6 hours SS (21 Nov 2022 05:08)  albuterol 90 mcg/inh inhalation aerosol with adapter: 2  inhaled every 6 hours (21 Nov 2022 04:24)  Bacid (LAC) oral tablet: 2 tab(s) by gastrostomy tube once a day (21 Nov 2022 04:24)  bacitracin 500 units/g topical ointment: Apply topically to affected area once a day to knees (21 Nov 2022 04:24)  chlorhexidine 0.12% mucous membrane liquid: 15 milliliter(s) mucous membrane 2 times a day (21 Nov 2022 04:24)  Dakins Full Strength 0.5% topical solution: Apply topically to affected area 2 times a day then apply santyl and calcium alginate (21 Nov 2022 04:24)  Eucerin topical cream: Apply topically to affected area once a day bilateral feet (21 Nov 2022 04:24)  ferrous sulfate 325 mg (65 mg elemental iron) oral tablet: 1 tab(s) by gastrostomy tube once a day (21 Nov 2022 04:24)  insulin glargine 100 units/mL subcutaneous solution: 8 unit(s) subcutaneous once a day (in the morning) (21 Nov 2022 04:24)  ipratropium-albuterol 0.5 mg-2.5 mg/3 mL inhalation solution: 3 milliliter(s) inhaled every 6 hours (21 Nov 2022 04:24)  LORazepam 1 mg oral tablet: 1 tab(s) by gastrostomy tube every 4 hours (21 Nov 2022 04:24)  midodrine 10 mg oral tablet: 1 tab(s) by gastrostomy tube every 8 hours (21 Nov 2022 04:24)  MiraLax oral powder for reconstitution: orally once a day (at bedtime) (21 Nov 2022 05:08)  mulivitamin: by gastrostomy tube once a day (21 Nov 2022 04:24)  nystatin 100,000 units/g topical powder: 1 application topically 3 times a day (21 Nov 2022 04:24)  omeprazole 40 mg oral delayed release capsule: 1 cap(s) by gastrostomy tube 2 times a day (21 Nov 2022 04:24)  polyethylene glycol 3350 oral powder for reconstitution: 17 gram(s) by gastrostomy tube every 12 hours (21 Nov 2022 04:24)  senna 8.6 mg oral tablet: 3 tab(s) by gastrostomy tube once a day (at bedtime) (21 Nov 2022 04:24)  simethicone 80 mg oral tablet, chewable: 1 tab(s) by gastrostomy tube every 6 hours (21 Nov 2022 04:24)  sucralfate 1 g/10 mL oral suspension: 10 milliliter(s) g-tube 4 times a day (before meals and at bedtime) (21 Nov 2022 04:24)  Tylenol 325 mg oral tablet: 2 tab(s) orally every 6 hours, As Needed prior to dressing change (21 Nov 2022 04:24)      MEDICATIONS  (STANDING):  albuterol    90 MICROgram(s) HFA Inhaler 2 Puff(s) Inhalation two times a day  chlorhexidine 0.12% Liquid 15 milliLiter(s) Oral Mucosa every 12 hours  chlorhexidine 2% Cloths 1 Application(s) Topical daily  dextrose 5%. 1000 milliLiter(s) (100 mL/Hr) IV Continuous <Continuous>  dextrose 5%. 1000 milliLiter(s) (50 mL/Hr) IV Continuous <Continuous>  dextrose 50% Injectable 25 Gram(s) IV Push once  dextrose 50% Injectable 12.5 Gram(s) IV Push once  dextrose 50% Injectable 25 Gram(s) IV Push once  glucagon  Injectable 1 milliGRAM(s) IntraMuscular once  heparin   Injectable 5000 Unit(s) SubCutaneous every 12 hours  insulin glargine Injectable (LANTUS) 8 Unit(s) SubCutaneous every morning  insulin lispro (ADMELOG) corrective regimen sliding scale   SubCutaneous every 6 hours  LORazepam     Tablet 1 milliGRAM(s) Oral every 4 hours  midodrine 15 milliGRAM(s) Oral every 8 hours  norepinephrine Infusion 0.05 MICROgram(s)/kG/Min (5.35 mL/Hr) IV Continuous <Continuous>  pantoprazole  Injectable 40 milliGRAM(s) IV Push daily  polyethylene glycol 3350 17 Gram(s) Oral every 12 hours  povidone iodine 10% Solution 1 Application(s) Topical daily  senna 2 Tablet(s) Oral at bedtime    MEDICATIONS  (PRN):  dextrose Oral Gel 15 Gram(s) Oral once PRN Blood Glucose LESS THAN 70 milliGRAM(s)/deciliter  fentaNYL    Injectable 50 MICROGram(s) IV Push every 6 hours PRN Tachypnea and Vent dyssynchrony  LORazepam   Injectable 2 milliGRAM(s) IV Push every 6 hours PRN Agitation  sodium chloride 0.9% lock flush 10 milliLiter(s) IV Push every 1 hour PRN Pre/post blood products, medications, blood draw, and to maintain line patency      Diet, NPO with Tube Feed:   Tube Feeding Modality: Gastrostomy  Glucerna 1.5 Horacio  Total Volume for 24 Hours (mL): 1000  Continuous  Starting Tube Feed Rate mL per Hour: 10  Increase Tube Feed Rate by (mL): 10     Every 10 hours  Until Goal Tube Feed Rate (mL per Hour): 50  Tube Feed Duration (in Hours): 20  Tube Feed Start Time: 17:00  Free Water Flush  Pump   Rate (mL per Hour): 25   Frequency: Every Hour    Duration (Hours): 24 (11-21-22 @ 05:10) [Active]          Vital Signs Last 24 Hrs  T(C): 36.3 (04 Dec 2022 00:00), Max: 36.7 (03 Dec 2022 15:49)  T(F): 97.4 (04 Dec 2022 00:00), Max: 98 (03 Dec 2022 15:49)  HR: 81 (04 Dec 2022 08:00) (72 - 83)  BP: 127/70 (04 Dec 2022 08:00) (103/59 - 145/73)  BP(mean): 86 (04 Dec 2022 08:00) (73 - 104)  RR: 37 (04 Dec 2022 08:00) (14 - 44)  SpO2: 99% (04 Dec 2022 08:00) (93% - 100%)    Parameters below as of 04 Dec 2022 07:21  Patient On (Oxygen Delivery Method): ventilator,100          12-03-22 @ 07:01  -  12-04-22 @ 07:00  --------------------------------------------------------  IN: 1100 mL / OUT: 300 mL / NET: 800 mL        Mode: AC/ CMV (Assist Control/ Continuous Mandatory Ventilation), RR (machine): 18, TV (machine): 350, FiO2: 100, PEEP: 8, ITime: 1, MAP: 23, PIP: 46      LABS:                        8.6    14.63 )-----------( 184      ( 04 Dec 2022 05:45 )             29.1     12-04    139  |  103  |  22  ----------------------------<  160<H>  4.7   |  33<H>  |  1.03    Ca    8.7      04 Dec 2022 05:45  Phos  4.0     12-04  Mg     2.4     12-04    TPro  7.7  /  Alb  1.7<L>  /  TBili  0.5  /  DBili  x   /  AST  12  /  ALT  13  /  AlkPhos  171<H>  12-04              WBC:  WBC Count: 14.63 K/uL (12-04 @ 05:45)  WBC Count: 8.87 K/uL (12-03 @ 06:27)  WBC Count: 8.74 K/uL (12-02 @ 07:25)  WBC Count: 5.85 K/uL (12-01 @ 12:00)  WBC Count: 5.51 K/uL (12-01 @ 05:58)      MICROBIOLOGY:  RECENT CULTURES:                  Sodium:  Sodium, Serum: 139 mmol/L (12-04 @ 05:45)  Sodium, Serum: 141 mmol/L (12-03 @ 06:27)  Sodium, Serum: 140 mmol/L (12-02 @ 07:25)  Sodium, Serum: 141 mmol/L (12-01 @ 05:58)      1.03 mg/dL 12-04 @ 05:45  0.94 mg/dL 12-03 @ 06:27  1.08 mg/dL 12-02 @ 07:25  1.05 mg/dL 12-01 @ 05:58      Hemoglobin:  Hemoglobin: 8.6 g/dL (12-04 @ 05:45)  Hemoglobin: 8.3 g/dL (12-03 @ 06:27)  Hemoglobin: 9.1 g/dL (12-02 @ 07:25)  Hemoglobin: 7.8 g/dL (12-01 @ 12:00)  Hemoglobin: 7.3 g/dL (12-01 @ 05:58)      Platelets: Platelet Count - Automated: 184 K/uL (12-04 @ 05:45)  Platelet Count - Automated: 185 K/uL (12-03 @ 06:27)  Platelet Count - Automated: 214 K/uL (12-02 @ 07:25)  Platelet Count - Automated: 157 K/uL (12-01 @ 12:00)  Platelet Count - Automated: 147 K/uL (12-01 @ 05:58)      LIVER FUNCTIONS - ( 04 Dec 2022 05:45 )  Alb: 1.7 g/dL / Pro: 7.7 g/dL / ALK PHOS: 171 U/L / ALT: 13 U/L DA / AST: 12 U/L / GGT: x                 RADIOLOGY & ADDITIONAL STUDIES:      MICROBIOLOGY:  RECENT CULTURES:

## 2022-12-04 NOTE — PROGRESS NOTE ADULT - ASSESSMENT
The patient is a 77 year old female with a history of HTN, DM, CVA, dementia, chronic respiratory failure s/p trach, PEG, cardiac arrest, anemia, pleural effusions who presents with fever and respiratory distress.    12/4/22  Seen at [facility] ICU  Lying flat, sleeping comfortably    Plan:  - Repeat echo with normal LV systolic function and worsened pulmonary pressures, now severe pulm HTN  - CXR with diffuse lung infiltrates  - Continue midodrine 15 mg tid  - Low oncotic pressure leading to third spacing - diuretics likely to have minimal long term effect but can attempt if needed with worsening hypoxia  - Severe pulm HTN - not a candidate for advanced pulmonary therapies  - Pulm and ID follow-up  - Comfort care would be most appropriate

## 2022-12-04 NOTE — PROGRESS NOTE ADULT - ASSESSMENT
REVIEW OF SYMPTOMS      Able to give (reliable) ROS  NO     PHYSICAL EXAM    HEENT Unremarkable  atraumatic   RESP Fair air entry EXP prolonged    Harsh breath sound Resp distres mild   CARDIAC S1 S2 No S3     NO JVD    ABDOMEN SOFT BS PRESENT NOT DISTENDED No hepatosplenomegaly   PEDAL EDEMA present No calf tenderness  NO rash       GENERAL DATA .   GOC.  full code      ALLGY.     codeine                        WT. 11/21/2022 57  BMI.    11/21/2022 21                          ICU STAY.   admitted ICU 11/21/2022  COVID.  Scv2 11/21/2022 scv2 (-)    PROCEDURE.  11/21/2022 [facility] central line    BEST PRACTICE ISSUES.    HOB ELEVATN. Yes  DVT PPLX.  11/21/2022 hpsc    SQUIRES PPLX.    11/21/2022 protonix 40   INFN PPLX.    11/21/2022 chlorhexidine .12% bid (mrsa)   11/21/2022 chlorhexidine 2% (c li)   SP SW EM.         DIET.  11/21/2022 glucerna 1.5 1000 gt               VS/ PO/IO/ VENT/ DRIPS.  12/4/2022 68 120/60   12/4/2022 ac 18/350/8/100     CURRENT ADMISSION  Pt sent back from [facility] 11/21/2022 with fever abn labs Found yto be in shock Norepi started 11/21/2022   Pulm crit care consulted      RECENT ADMISSIONS.  11/12-11/16/2022 11/4-11/10/2022  11/5 stenotrophomonas  uc 11/4 vr enterococc 50-99 candida   11/4/2022 levaquin 750 dced 11/7 doxy  11/8 bactrim 250.3  10/18-11/2/2022 11/21 ADMISSION PROBLEMS.  Vent dependent   .. Trach poa 11/21/2022  .. ac 18/350/8/100   RO VTE   .. 11/26 v duplx (-)   RIJ 11/21/2022   INFECTN   .. Decub ulcers   .. VAP 11/21/2022  ...... trach 11/21 mod acinetobacter carbapenem resist   ...... Bactrim tid  11/21-11/25/2022  ...... 11/25/2022 Unasyn 3.4 x 7d Dr Chairez    COPD  Shock   .. Norepi 11/21/2022-> 11/21-11/27  .. midodrine 11/21/2022   BOLUS  .. 12/4/2022 7p lr 250   peg poa 11/21/2022  ANEMIA   .. Hb 11/25-12/4/2022 Hb 7.5- 8.6    ASSESSMENT/RECOMMENDATIONS .   RESP.  .. Gas exchange.  11/29/2022 ac 18/350/8/80 747/45/64   11/21/2022 4a On 100% vent 759/36/273   Monitor & target po 90-95%  .. VENT MANAGEMENT.   HOB elevation  Target Pplat 30 (-)  Target PO 90-95%  Target pH 730 (+)  Daily spontaneous breathing trials   Daily sedation vacation   .. Pleural effsn .   CXR 11/21/2022 r gr than l effsn  Effusion has been tapped during prev admissions and is lp exudate If fever or worsening sepsis will plan thorac  RO VTE.  11/26 v duplx (-)   11/26/2022 check cta ch   11/29/2022 attempts made to trasport pat several times last few d but pt becomes unstable when tried to move to ct per staff   .. Bronchospasm.  11/21/2022 albut hfa 2p bid   INFECTION.  .. SCV2 status.  Scv2 11/21/2022 scv2 (-)  .. VAP   w 11/21-11/23-11/24-11/25-11/26-11/27-11/28-11/29-11/30-12/1-12/3-12/4/2022   w 16 - 8.8 - 12- 9 - 9.4- 9.1 - 11  - 9.1 - 12.9 - 5.8 - 8.8-14.6   Pr 11/21-11/22/2022 pr 1.8 - 1.1  ua 11/21/2022 w 3-5   cxr 11/21 mod to large r effsn somewhat incr from 11/12 central infiltrates possibly congestive rij   rvp 11/21/2022 (-)   rsv 11/21/2022 (-)   uc 11/21 (-)   legn 11/21 (-)   trach 11/21 mod acinetobacter carbapenem resist   mrsa 11/21 (-)   bc 11/21 (-)   11/21/2022 bactrim 285 mg trimeth q 8 h Dr BRITTANY Brantley DCED 11/25/2022 11/25/2022 Unasyn 3.4 x 7d Dr Chairez    12/4/2022 OFF ABIO  CARDIAC.  .. Shock.    11/21/2022 midodrine 10.3 -> 11/22 midodr 15.3   11/21/2022 5a norepi 8 in 250   target map 65 (+)   .. BOLUS  12/4/2022 7p lr 250   .. ekg changes.  ekg 11/21/2022 s tach shoirt pr qtc 470 possible rvh   tr 11/22/2022 tr 28   .. CHF  bnp 11/30/2022 [id number]  11/30/2022 checkj echo   GI.  .. Nutrition.   11/21/2022 glucerna 1.5 1000 gt    HEMAT.  .. DVT pplx.   11/21/2022 hpsc    .. anemia.  Hb 11/21-11/22-11/23-11/24-11/25-11/26-11/28-11/29-11/30/-12/1-12/2-12/3-12/4/2022   Hb 9.6- 7.8 - 7.7 - 7.7- 7.5 - 7.5  - 8.7 - 8.1 - 9.2 - 7.8 - 9.1 - 8.3 - 8.6  monitor target Hb 7 (+)     TIME SPENT   Over 39 minutes aggregate critical care time spent on encounter; activities included   direct patient care, counseling and/or coordinating care reviewing notes, lab data/ imaging , discussion with multidisciplinary team/ patient  /family and explaining in detail risks, benefits, alternatives  of the recommendations     CHAPINCITO GUIDRY 76 f [facility] S 11/21/2022   DR JOSEPH DU

## 2022-12-04 NOTE — PROGRESS NOTE ADULT - SUBJECTIVE AND OBJECTIVE BOX
Patient is a 77y old  Female who presents with a chief complaint of diaphoresis and fever (04 Dec 2022 09:49)      INTERVAL HPI/OVERNIGHT EVENTS:    not able to wean off 100%    spoke with  to update him .  He wants a CT done to rule out pleural effusion    spoke with sarah, recommending CTA to rule out PE    MEDICATIONS  (STANDING):  albuterol    90 MICROgram(s) HFA Inhaler 2 Puff(s) Inhalation two times a day  chlorhexidine 0.12% Liquid 15 milliLiter(s) Oral Mucosa every 12 hours  chlorhexidine 2% Cloths 1 Application(s) Topical daily  dextrose 5%. 1000 milliLiter(s) (50 mL/Hr) IV Continuous <Continuous>  dextrose 5%. 1000 milliLiter(s) (100 mL/Hr) IV Continuous <Continuous>  dextrose 50% Injectable 25 Gram(s) IV Push once  dextrose 50% Injectable 12.5 Gram(s) IV Push once  dextrose 50% Injectable 25 Gram(s) IV Push once  glucagon  Injectable 1 milliGRAM(s) IntraMuscular once  heparin   Injectable 5000 Unit(s) SubCutaneous every 12 hours  insulin glargine Injectable (LANTUS) 8 Unit(s) SubCutaneous every morning  insulin lispro (ADMELOG) corrective regimen sliding scale   SubCutaneous every 6 hours  LORazepam     Tablet 1 milliGRAM(s) Oral every 4 hours  midodrine 15 milliGRAM(s) Oral every 8 hours  norepinephrine Infusion 0.05 MICROgram(s)/kG/Min (5.35 mL/Hr) IV Continuous <Continuous>  pantoprazole  Injectable 40 milliGRAM(s) IV Push daily  polyethylene glycol 3350 17 Gram(s) Oral every 12 hours  povidone iodine 10% Solution 1 Application(s) Topical daily  senna 2 Tablet(s) Oral at bedtime    MEDICATIONS  (PRN):  dextrose Oral Gel 15 Gram(s) Oral once PRN Blood Glucose LESS THAN 70 milliGRAM(s)/deciliter  fentaNYL    Injectable 50 MICROGram(s) IV Push every 6 hours PRN Tachypnea and Vent dyssynchrony  LORazepam   Injectable 2 milliGRAM(s) IV Push every 6 hours PRN Agitation  sodium chloride 0.9% lock flush 10 milliLiter(s) IV Push every 1 hour PRN Pre/post blood products, medications, blood draw, and to maintain line patency      Allergies    codeine (Hives)    Intolerances        REVIEW OF SYSTEMS:  unable to obtain    Vital Signs Last 24 Hrs  T(C): 36.8 (04 Dec 2022 08:30), Max: 36.8 (04 Dec 2022 08:30)  T(F): 98.3 (04 Dec 2022 08:30), Max: 98.3 (04 Dec 2022 08:30)  HR: 74 (04 Dec 2022 12:00) (71 - 83)  BP: 98/51 (04 Dec 2022 12:00) (98/51 - 145/73)  BP(mean): 66 (04 Dec 2022 12:00) (66 - 104)  RR: 21 (04 Dec 2022 12:00) (14 - 44)  SpO2: 100% (04 Dec 2022 12:00) (97% - 100%)    Parameters below as of 04 Dec 2022 07:21  Patient On (Oxygen Delivery Method): ventilator,100        PHYSICAL EXAM:  GENERAL: chronically ill appearing, emaciated, NAD, obtunded  HEAD: atraumatic  EYES: conjunctiva clear  NECK: (+)trach in place, clean  RESPIRATORY: mechanical breath sounds, diminished, vent in place, no gross crackles or rales  CARDIOVASCULAR:  regular rate and rhythm, no murmurs or rubs or gallops, 2+ peripheral pulses  GASTROINTESTINAL:  soft, +PEG in place, nondistended, bowel sounds present  EXTREMITIES: no clubbing or cyanosis or edema  MUSCULOSKELETAL: (+)diffuse contractures in all joints  NERVOUS SYSTEM: does not respond to pain      LABS:                        8.6    14.63 )-----------( 184      ( 04 Dec 2022 05:45 )             29.1     04 Dec 2022 05:45    139    |  103    |  22     ----------------------------<  160    4.7     |  33     |  1.03     Ca    8.7        04 Dec 2022 05:45  Phos  4.0       04 Dec 2022 05:45  Mg     2.4       04 Dec 2022 05:45    TPro  7.7    /  Alb  1.7    /  TBili  0.5    /  DBili  x      /  AST  12     /  ALT  13     /  AlkPhos  171    04 Dec 2022 05:45        CAPILLARY BLOOD GLUCOSE      POCT Blood Glucose.: 158 mg/dL (04 Dec 2022 11:55)  POCT Blood Glucose.: 169 mg/dL (04 Dec 2022 08:42)  POCT Blood Glucose.: 155 mg/dL (04 Dec 2022 05:48)  POCT Blood Glucose.: 158 mg/dL (04 Dec 2022 00:08)  POCT Blood Glucose.: 131 mg/dL (03 Dec 2022 17:16)      RADIOLOGY & ADDITIONAL TESTS:

## 2022-12-05 LAB
ANION GAP SERPL CALC-SCNC: 4 MMOL/L — LOW (ref 5–17)
BASOPHILS # BLD AUTO: 0.02 K/UL — SIGNIFICANT CHANGE UP (ref 0–0.2)
BASOPHILS NFR BLD AUTO: 0.2 % — SIGNIFICANT CHANGE UP (ref 0–2)
BUN SERPL-MCNC: 23 MG/DL — SIGNIFICANT CHANGE UP (ref 7–23)
CALCIUM SERPL-MCNC: 8.6 MG/DL — SIGNIFICANT CHANGE UP (ref 8.4–10.5)
CHLORIDE SERPL-SCNC: 103 MMOL/L — SIGNIFICANT CHANGE UP (ref 96–108)
CO2 SERPL-SCNC: 32 MMOL/L — HIGH (ref 22–31)
CREAT SERPL-MCNC: 0.96 MG/DL — SIGNIFICANT CHANGE UP (ref 0.5–1.3)
EGFR: 60 ML/MIN/1.73M2 — SIGNIFICANT CHANGE UP
EOSINOPHIL # BLD AUTO: 0.2 K/UL — SIGNIFICANT CHANGE UP (ref 0–0.5)
EOSINOPHIL NFR BLD AUTO: 2.2 % — SIGNIFICANT CHANGE UP (ref 0–6)
GLUCOSE SERPL-MCNC: 157 MG/DL — HIGH (ref 70–99)
HCT VFR BLD CALC: 27.1 % — LOW (ref 34.5–45)
HGB BLD-MCNC: 8 G/DL — LOW (ref 11.5–15.5)
IMM GRANULOCYTES NFR BLD AUTO: 0.7 % — SIGNIFICANT CHANGE UP (ref 0–0.9)
LYMPHOCYTES # BLD AUTO: 0.83 K/UL — LOW (ref 1–3.3)
LYMPHOCYTES # BLD AUTO: 9 % — LOW (ref 13–44)
MAGNESIUM SERPL-MCNC: 2.3 MG/DL — SIGNIFICANT CHANGE UP (ref 1.6–2.6)
MCHC RBC-ENTMCNC: 28.1 PG — SIGNIFICANT CHANGE UP (ref 27–34)
MCHC RBC-ENTMCNC: 29.5 GM/DL — LOW (ref 32–36)
MCV RBC AUTO: 95.1 FL — SIGNIFICANT CHANGE UP (ref 80–100)
MONOCYTES # BLD AUTO: 0.61 K/UL — SIGNIFICANT CHANGE UP (ref 0–0.9)
MONOCYTES NFR BLD AUTO: 6.6 % — SIGNIFICANT CHANGE UP (ref 2–14)
NEUTROPHILS # BLD AUTO: 7.49 K/UL — HIGH (ref 1.8–7.4)
NEUTROPHILS NFR BLD AUTO: 81.3 % — HIGH (ref 43–77)
NRBC # BLD: 0 /100 WBCS — SIGNIFICANT CHANGE UP (ref 0–0)
PHOSPHATE SERPL-MCNC: 4.5 MG/DL — SIGNIFICANT CHANGE UP (ref 2.5–4.5)
PLATELET # BLD AUTO: 166 K/UL — SIGNIFICANT CHANGE UP (ref 150–400)
POTASSIUM SERPL-MCNC: 4.6 MMOL/L — SIGNIFICANT CHANGE UP (ref 3.5–5.3)
POTASSIUM SERPL-SCNC: 4.6 MMOL/L — SIGNIFICANT CHANGE UP (ref 3.5–5.3)
RBC # BLD: 2.85 M/UL — LOW (ref 3.8–5.2)
RBC # FLD: 16.1 % — HIGH (ref 10.3–14.5)
SODIUM SERPL-SCNC: 139 MMOL/L — SIGNIFICANT CHANGE UP (ref 135–145)
WBC # BLD: 9.21 K/UL — SIGNIFICANT CHANGE UP (ref 3.8–10.5)
WBC # FLD AUTO: 9.21 K/UL — SIGNIFICANT CHANGE UP (ref 3.8–10.5)

## 2022-12-05 PROCEDURE — 99232 SBSQ HOSP IP/OBS MODERATE 35: CPT

## 2022-12-05 RX ADMIN — HEPARIN SODIUM 5000 UNIT(S): 5000 INJECTION INTRAVENOUS; SUBCUTANEOUS at 05:10

## 2022-12-05 RX ADMIN — Medication 2: at 17:27

## 2022-12-05 RX ADMIN — Medication 1 MILLIGRAM(S): at 14:47

## 2022-12-05 RX ADMIN — HEPARIN SODIUM 5000 UNIT(S): 5000 INJECTION INTRAVENOUS; SUBCUTANEOUS at 17:27

## 2022-12-05 RX ADMIN — Medication 2: at 00:31

## 2022-12-05 RX ADMIN — Medication 1 MILLIGRAM(S): at 01:01

## 2022-12-05 RX ADMIN — CHLORHEXIDINE GLUCONATE 15 MILLILITER(S): 213 SOLUTION TOPICAL at 17:26

## 2022-12-05 RX ADMIN — FENTANYL CITRATE 50 MICROGRAM(S): 50 INJECTION INTRAVENOUS at 18:45

## 2022-12-05 RX ADMIN — ALBUTEROL 2 PUFF(S): 90 AEROSOL, METERED ORAL at 20:18

## 2022-12-05 RX ADMIN — Medication 1 MILLIGRAM(S): at 23:08

## 2022-12-05 RX ADMIN — Medication 1 MILLIGRAM(S): at 05:10

## 2022-12-05 RX ADMIN — CHLORHEXIDINE GLUCONATE 15 MILLILITER(S): 213 SOLUTION TOPICAL at 05:11

## 2022-12-05 RX ADMIN — ALBUTEROL 2 PUFF(S): 90 AEROSOL, METERED ORAL at 06:31

## 2022-12-05 RX ADMIN — FENTANYL CITRATE 50 MICROGRAM(S): 50 INJECTION INTRAVENOUS at 11:33

## 2022-12-05 RX ADMIN — FENTANYL CITRATE 50 MICROGRAM(S): 50 INJECTION INTRAVENOUS at 11:48

## 2022-12-05 RX ADMIN — Medication 1 MILLIGRAM(S): at 10:18

## 2022-12-05 RX ADMIN — POLYETHYLENE GLYCOL 3350 17 GRAM(S): 17 POWDER, FOR SOLUTION ORAL at 17:26

## 2022-12-05 RX ADMIN — FENTANYL CITRATE 50 MICROGRAM(S): 50 INJECTION INTRAVENOUS at 18:30

## 2022-12-05 RX ADMIN — CHLORHEXIDINE GLUCONATE 1 APPLICATION(S): 213 SOLUTION TOPICAL at 05:12

## 2022-12-05 RX ADMIN — Medication 1 MILLIGRAM(S): at 17:26

## 2022-12-05 RX ADMIN — Medication 2: at 05:20

## 2022-12-05 RX ADMIN — INSULIN GLARGINE 8 UNIT(S): 100 INJECTION, SOLUTION SUBCUTANEOUS at 05:21

## 2022-12-05 RX ADMIN — MIDODRINE HYDROCHLORIDE 15 MILLIGRAM(S): 2.5 TABLET ORAL at 05:11

## 2022-12-05 RX ADMIN — Medication 1 APPLICATION(S): at 11:33

## 2022-12-05 RX ADMIN — SENNA PLUS 2 TABLET(S): 8.6 TABLET ORAL at 23:09

## 2022-12-05 RX ADMIN — PANTOPRAZOLE SODIUM 40 MILLIGRAM(S): 20 TABLET, DELAYED RELEASE ORAL at 11:33

## 2022-12-05 NOTE — PROGRESS NOTE ADULT - SUBJECTIVE AND OBJECTIVE BOX
Chief Complaint: Respiratory distress    Interval Events: No events overnight.    Review of Systems:  Unable to obtain    Physical Exam:  Vital Signs Last 24 Hrs  T(C): 36.9 (05 Dec 2022 08:18), Max: 36.9 (05 Dec 2022 08:18)  T(F): 98.4 (05 Dec 2022 08:18), Max: 98.4 (05 Dec 2022 08:18)  HR: 85 (05 Dec 2022 10:00) (64 - 100)  BP: 132/67 (05 Dec 2022 10:00) (85/47 - 155/65)  BP(mean): 87 (05 Dec 2022 10:00) (61 - 92)  RR: 42 (05 Dec 2022 10:00) (16 - 42)  SpO2: 97% (05 Dec 2022 10:00) (96% - 100%)  Parameters below as of 05 Dec 2022 10:00  Patient On (Oxygen Delivery Method): ventilator  O2 Concentration (%): 100  General: Unresponsive  HEENT: MMM  Neck: No JVD, no carotid bruit  Lungs: CTAB  CV: RRR, nl S1/S2, no M/R/G  Abdomen: S/NT/ND, +BS  Extremities: 2+ LE edema, no cyanosis  Neuro: AAOx0  Skin: No rash    Labs:    12-05    139  |  103  |  23  ----------------------------<  157<H>  4.6   |  32<H>  |  0.96    Ca    8.6      05 Dec 2022 06:20  Phos  4.5     12-05  Mg     2.3     12-05    TPro  7.7  /  Alb  1.7<L>  /  TBili  0.5  /  DBili  x   /  AST  12  /  ALT  13  /  AlkPhos  171<H>  12-04                        8.0    9.21  )-----------( 166      ( 05 Dec 2022 06:20 )             27.1     ECG/Telemetry: Sinus rhythm

## 2022-12-05 NOTE — PROGRESS NOTE ADULT - ASSESSMENT
REVIEW OF SYMPTOMS      Able to give (reliable) ROS  NO     PHYSICAL EXAM    HEENT Unremarkable  atraumatic   RESP Fair air entry EXP prolonged    Harsh breath sound Resp distres mild   CARDIAC S1 S2 No S3     NO JVD    ABDOMEN SOFT BS PRESENT NOT DISTENDED No hepatosplenomegaly   PEDAL EDEMA present No calf tenderness  NO rash       GENERAL DATA .   GOC.  full code      ALLGY.     codeine                        WT. 11/21/2022 57  BMI.    11/21/2022 21                          ICU STAY.   admitted ICU 11/21/2022  COVID.  Scv2 11/21/2022 scv2 (-)    PROCEDURE.  11/21/2022 [facility] central line    BEST PRACTICE ISSUES.    HOB ELEVATN. Yes  DVT PPLX.  11/21/2022 hpsc    SQUIRES PPLX.    11/21/2022 protonix 40   INFN PPLX.    11/21/2022 chlorhexidine .12% bid (mrsa)   11/21/2022 chlorhexidine 2% (c li)   SP SW EM.         DIET.  11/21/2022 glucerna 1.5 1000 gt      VS/ PO/IO/ VENT/ DRIPS.  12/5/2022 89 140/70   12/5/2022 ac 18/350/5/100     CURRENT ADMISSION  Pt sent back from [facility] 11/21/2022 with fever abn labs Found yto be in shock Norepi started 11/21/2022   Pulm crit care consulted      RECENT ADMISSIONS.  11/12-11/16/2022 11/4-11/10/2022  11/5 stenotrophomonas  uc 11/4 vr enterococc 50-99 candida   11/4/2022 levaquin 750 dced 11/7 doxy  11/8 bactrim 250.3  10/18-11/2/2022 11/21 ADMISSION PROBLEMS.  Vent dependent   .. Trach poa 11/21/2022  Pleural effsn   RO VTE  .. 11/26 v duplx (-)   RIJ 11/21/2022   INFECTN   .. Decub ulcers   .. VAP 11/21/2022  ...... trach 11/21 mod acinetobacter carbapenem resist   ...... Bactrim tid  11/21-11/25/2022  ...... 11/25/2022 Unasyn 3.4 x 7d Dr Chairez    COPD  Pulmonary hypertension   .. 11/30/2022 echo n lvsf dilated rv n rvs sf ph 91      peg poa 11/21/2022  ANEMIA   .. Hb 11/25-12/4/2022 Hb 7.5- 8.6  Shock   .. midodrine 11/21/2022     ASSESSMENT/RECOMMENDATIONS .   RESP.  .. Gas exchange.  11/29/2022 ac 18/350/8/80 747/45/64   11/21/2022 4a On 100% vent 759/36/273   Monitor & target po 90-95%  .. VENT MANAGEMENT.   HOB elevation  Target Pplat 30 (-)  Target PO 90-95%  Target pH 730 (+)  Daily spontaneous breathing trials as applicable    Daily sedation vacation as applicable   .. Pleural effsn .   CXR 11/21/2022 r gr than l effsn  Effusion has been tapped during prev admissions and is lp exudate If fever or worsening sepsis will plan thorac  12/5/2022 US chest plannd   RO VTE.  11/26 v duplx (-)   11/26/2022 check cta ch   12/5/2022 attempts made to trasport pat several times last few d but pt becomes unstable when tried to move to ct per staff   .. Bronchospasm.  11/21/2022 albut hfa 2p bid   INFECTION.  .. SCV2 status.  Scv2 11/21/2022 scv2 (-)  .. VAP   w 12/5/2022 w 9.2   Pr 11/21-11/22/2022 pr 1.8 - 1.1  ua 11/21/2022 w 3-5   cxr 11/21 mod to large r effsn somewhat incr from 11/12 central infiltrates possibly congestive rij   trach 11/21 mod acinetobacter carbapenem resist   mrsa 11/21 (-)   12/4/2022 OFF ABIO  CARDIAC.  .. Shock.    11/22 midodr 15.3   target map 65 (+)     .. CHF R HF   bnp 11/30/2022 [id number]  11/30/2022 echo n lvsf dilated rv n rvs sf ph 91        GI.  .. Nutrition.   11/21/2022 glucerna 1.5 1000 gt    HEMAT.  .. DVT pplx.   11/21/2022 hpsc    .. anemia.  Hb 12/5/2022 Hb 8   monitor target Hb 7 (+)       TIME SPENT   Over 39 minutes aggregate critical care time spent on encounter; activities included   direct patient care, counseling and/or coordinating care reviewing notes, lab data/ imaging , discussion with multidisciplinary team/ patient  /family and explaining in detail risks, benefits, alternatives  of the recommendations     CHAPINCITO GUIDRY 76 f [facility] S 11/21/2022   DR JOSEPH DU

## 2022-12-05 NOTE — PROGRESS NOTE ADULT - SUBJECTIVE AND OBJECTIVE BOX
Patient is a 77y old  Female who presents with a chief complaint of diaphoresis and fever (05 Dec 2022 13:46)      BRIEF HOSPITAL COURSE: 76 y/o female with pmhx of COPD s/p trach/peg, CVA with quadriplegia, CKD, anemia, cardiac arrest, multiple admission for sepsis due to recurrent VAP now admitted on 11/21 with fialure to thrive and septic shock due to VAP with carbapenem resistant acinetobacter baumannii and acute on chronic hypoxic respiratory failure with high fio2 requirements.    Events last 24 hours: remains on 100% fio2, peep of 5. satting 96%    PAST MEDICAL & SURGICAL HISTORY:  Dementia of frontal lobe type      Aphasic stroke      Diabetes mellitus      Respiratory failure      Hypertension      GERD (gastroesophageal reflux disease)      Constipation      Respiratory failure      CVA (cerebral vascular accident)      HTN (hypertension)      DM (diabetes mellitus)      Advanced dementia      COVID-19 virus detected      Quadriplegia      Pneumonia      Type II diabetes mellitus      Hx of appendectomy      Gastrostomy in place      Tracheostomy in place      Tracheostomy tube present      Feeding by G-tube          Review of Systems:  unable to obtain due to poor baseline mental status      Medications:    midodrine 15 milliGRAM(s) Oral every 8 hours  norepinephrine Infusion 0.05 MICROgram(s)/kG/Min IV Continuous <Continuous>    albuterol    90 MICROgram(s) HFA Inhaler 2 Puff(s) Inhalation two times a day    fentaNYL    Injectable 50 MICROGram(s) IV Push every 6 hours PRN  LORazepam     Tablet 1 milliGRAM(s) Oral every 4 hours  LORazepam   Injectable 2 milliGRAM(s) IV Push every 6 hours PRN      heparin   Injectable 5000 Unit(s) SubCutaneous every 12 hours    pantoprazole  Injectable 40 milliGRAM(s) IV Push daily  polyethylene glycol 3350 17 Gram(s) Oral every 12 hours  senna 2 Tablet(s) Oral at bedtime      dextrose 50% Injectable 25 Gram(s) IV Push once  dextrose 50% Injectable 12.5 Gram(s) IV Push once  dextrose 50% Injectable 25 Gram(s) IV Push once  dextrose Oral Gel 15 Gram(s) Oral once PRN  glucagon  Injectable 1 milliGRAM(s) IntraMuscular once  insulin glargine Injectable (LANTUS) 8 Unit(s) SubCutaneous every morning  insulin lispro (ADMELOG) corrective regimen sliding scale   SubCutaneous every 6 hours    dextrose 5%. 1000 milliLiter(s) IV Continuous <Continuous>  dextrose 5%. 1000 milliLiter(s) IV Continuous <Continuous>  sodium chloride 0.9% lock flush 10 milliLiter(s) IV Push every 1 hour PRN      chlorhexidine 0.12% Liquid 15 milliLiter(s) Oral Mucosa every 12 hours  chlorhexidine 2% Cloths 1 Application(s) Topical daily  povidone iodine 10% Solution 1 Application(s) Topical daily        Mode: AC/ CMV (Assist Control/ Continuous Mandatory Ventilation)  RR (machine): 18  TV (machine): 350  FiO2: 100  PEEP: 5  ITime: 1  MAP: 20  PIP: 46      ICU Vital Signs Last 24 Hrs  T(C): 37.1 (05 Dec 2022 15:42), Max: 37.3 (05 Dec 2022 13:55)  T(F): 98.8 (05 Dec 2022 15:42), Max: 99.1 (05 Dec 2022 13:55)  HR: 90 (05 Dec 2022 20:47) (64 - 100)  BP: 140/74 (05 Dec 2022 18:00) (94/52 - 155/65)  BP(mean): 91 (05 Dec 2022 18:00) (65 - 92)  ABP: --  ABP(mean): --  RR: 37 (05 Dec 2022 20:47) (16 - 42)  SpO2: 96% (05 Dec 2022 20:47) (94% - 100%)    O2 Parameters below as of 05 Dec 2022 20:47  Patient On (Oxygen Delivery Method): ventilator    O2 Concentration (%): 100            I&O's Detail    04 Dec 2022 07:01  -  05 Dec 2022 07:00  --------------------------------------------------------  IN:    Enteral Tube Flush: 300 mL    Glucerna 1.5: 600 mL    IV PiggyBack: 250 mL  Total IN: 1150 mL    OUT:    Incontinent per Collection Bag (mL): 350 mL  Total OUT: 350 mL    Total NET: 800 mL      05 Dec 2022 07:01  -  05 Dec 2022 21:18  --------------------------------------------------------  IN:    Enteral Tube Flush: 300 mL    Free Water: 90 mL    Glucerna 1.5: 700 mL  Total IN: 1090 mL    OUT:  Total OUT: 0 mL    Total NET: 1090 mL            LABS:                        8.0    9.21  )-----------( 166      ( 05 Dec 2022 06:20 )             27.1     12-05    139  |  103  |  23  ----------------------------<  157<H>  4.6   |  32<H>  |  0.96    Ca    8.6      05 Dec 2022 06:20  Phos  4.5     12-05  Mg     2.3     12-05    TPro  7.7  /  Alb  1.7<L>  /  TBili  0.5  /  DBili  x   /  AST  12  /  ALT  13  /  AlkPhos  171<H>  12-04          CAPILLARY BLOOD GLUCOSE      POCT Blood Glucose.: 163 mg/dL (05 Dec 2022 17:25)        CULTURES:      Physical Examination:    General: No acute distress.      HEENT: Pupils equal, reactive to light.  Symmetric.    PULM: Clear to auscultation bilaterally, no significant sputum production    NECK: Supple, no lymphadenopathy, trachea midline    CVS: Regular rate and rhythm, no murmurs, rubs, or gallops    ABD: Soft, nondistended, nontender, normoactive bowel sounds, no masses    EXT: No edema, nontender    SKIN: Warm and well perfused, no rashes noted.    NEURO: Alert, oriented, interactive, nonfocal    DEVICES:     RADIOLOGY:     IMPRESSION:  1. normal overall left ventricle systolic function  2. Dilated right ventricular, right ventricular hypertrophy with normal   right ventricular  systolic function  3. moderate tricuspid regurgitation with a severe pulmonary hypertension,   91 mmHg.  Technical difficult study, limited views  Pleural effusion noted. Recommend clinical correlation    --- End of Report ---            [facility]  This document has been electronically signed. Dec  1 2022 10:54AM      IMPRESSION:  No evidence of deep venous thrombosis in either lower extremity.    --- End of Report ---            HARLEY WILCOX MD; Attending Radiologist  This document has been electronically signed. Nov 26 2022 11:06PM      Impression:    Limited examination due to patient's hand position.    Pulmonary venous congestion and/or pneumonia with bilateral effusions.    --- End of Report ---             SHAYE BRAN DO; Attending Radiologist  This document has been electronically signed. Nov 28 2022 10:28AM      CRITICAL CARE TIME SPENT: 35 minutes of critical care time spent providing medical care for patient's acute illness/conditions that impairs at least one vital organ system and/or poses a high risk of imminent or life threatening deterioration in the patient's condition. It includes time spent evaluating and treating the patient's acute illness as well as time spent reviewing labs, radiology, discussing goals of care with patient and/or patient's family, and discussing the case with a multidisciplinary team in an effort to prevent further life threatening deterioration or end organ damage. This time is independent of any procedures performed.

## 2022-12-05 NOTE — PROGRESS NOTE ADULT - SUBJECTIVE AND OBJECTIVE BOX
[facility], Division of Infectious Diseases  MO[facility] Lora Y. Patel, S. Shah, [facility]  [phone]    Name: BREA BECKHAM  Age: 77y  Gender: Female  MRN: [id number]    Interval History:  Patient seen and examined at bedside  Afebrile  Notes reviewed    Antibiotics:      Medications:  albuterol    90 MICROgram(s) HFA Inhaler 2 Puff(s) Inhalation two times a day  chlorhexidine 0.12% Liquid 15 milliLiter(s) Oral Mucosa every 12 hours  chlorhexidine 2% Cloths 1 Application(s) Topical daily  dextrose 5%. 1000 milliLiter(s) IV Continuous <Continuous>  dextrose 5%. 1000 milliLiter(s) IV Continuous <Continuous>  dextrose 50% Injectable 25 Gram(s) IV Push once  dextrose 50% Injectable 12.5 Gram(s) IV Push once  dextrose 50% Injectable 25 Gram(s) IV Push once  dextrose Oral Gel 15 Gram(s) Oral once PRN  fentaNYL    Injectable 50 MICROGram(s) IV Push every 6 hours PRN  glucagon  Injectable 1 milliGRAM(s) IntraMuscular once  heparin   Injectable 5000 Unit(s) SubCutaneous every 12 hours  insulin glargine Injectable (LANTUS) 8 Unit(s) SubCutaneous every morning  insulin lispro (ADMELOG) corrective regimen sliding scale   SubCutaneous every 6 hours  LORazepam     Tablet 1 milliGRAM(s) Oral every 4 hours  LORazepam   Injectable 2 milliGRAM(s) IV Push every 6 hours PRN  midodrine 15 milliGRAM(s) Oral every 8 hours  norepinephrine Infusion 0.05 MICROgram(s)/kG/Min IV Continuous <Continuous>  pantoprazole  Injectable 40 milliGRAM(s) IV Push daily  polyethylene glycol 3350 17 Gram(s) Oral every 12 hours  povidone iodine 10% Solution 1 Application(s) Topical daily  senna 2 Tablet(s) Oral at bedtime  sodium chloride 0.9% lock flush 10 milliLiter(s) IV Push every 1 hour PRN      Review of Systems:  unable to obtain    Allergies: codeine (Hives)    For details regarding the patient's past medical history, social history, family history, and other miscellaneous elements, please refer the initial infectious diseases consultation and/or the admitting history and physical examination for this admission.    Objective:  Vitals:   T(C): 36.9 (12-05-22 @ 08:18), Max: 36.9 (12-05-22 @ 08:18)  HR: 86 (12-05-22 @ 11:00) (64 - 100)  BP: 141/60 (12-05-22 @ 11:00) (85/47 - 155/65)  RR: 40 (12-05-22 @ 11:00) (16 - 42)  SpO2: 96% (12-05-22 @ 11:00) (96% - 100%)    Mode: AC/ CMV (Assist Control/ Continuous Mandatory Ventilation)  RR (machine): 18  TV (machine): 350  FiO2: 100  PEEP: 5  ITime: 1  MAP: 20  PIP: 45      Physical Examination:  General: vented pt  HEENT: NC/AT  Neck: trach  Cardio: S1, S2 heard, RRR, no murmurs  Resp: MV breath sounds  Abd: distended  Ext: no edema or cyanosis  Skin: warm, dry, no visible rash    Laboratory Studies:  CBC:                       8.0    9.21  )-----------( 166      ( 05 Dec 2022 06:20 )             27.1     CMP: 12-05    139  |  103  |  23  ----------------------------<  157<H>  4.6   |  32<H>  |  0.96    Ca    8.6      05 Dec 2022 06:20  Phos  4.5     12-05  Mg     2.3     12-05    TPro  7.7  /  Alb  1.7<L>  /  TBili  0.5  /  DBili  x   /  AST  12  /  ALT  13  /  AlkPhos  171<H>  12-04    LIVER FUNCTIONS - ( 04 Dec 2022 05:45 )  Alb: 1.7 g/dL / Pro: 7.7 g/dL / ALK PHOS: 171 U/L / ALT: 13 U/L DA / AST: 12 U/L / GGT: x               Microbiology: reviewed    Culture - Sputum (collected 11-21-22 @ 20:14)  Source: Trach Asp Tracheal Aspirate  Gram Stain (11-22-22 @ 08:43):    Moderate polymorphonuclear leukocytes per low power field    No Squamous epithelial cells per low power field    No organisms seen  Final Report (11-24-22 @ 17:55):    Moderate Acinetobacter baumannii/nosocom group (Carbapenem Resistant)    Normal Respiratory Elvira present  Organism: Acinetobacter baumannii/nosocom group (Carbapenem Resistant) (11-24-22 @ 17:55)  Organism: Acinetobacter baumannii/nosocom group (Carbapenem Resistant) (11-24-22 @ 17:55)      -  Polymyxin B: I 0.25      Method Type: ETEST  Organism: Acinetobacter baumannii/nosocom group (Carbapenem Resistant) (11-24-22 @ 17:55)      -  Minocycline: R      -  Piperacillin/Tazobactam: R      Method Type: KB  Organism: Acinetobacter baumannii/nosocom group (Carbapenem Resistant) (11-24-22 @ 17:55)      -  Amikacin: S <=16      -  Ampicillin/Sulbactam: S 8/4      -  Cefepime: R >16      -  Ceftazidime: R >16      -  Ciprofloxacin: R >2      -  Gentamicin: I 8      -  Imipenem: R 8      -  Levofloxacin: R >4      -  Meropenem: R >8      -  Tobramycin: R >8      -  Trimethoprim/Sulfamethoxazole: R >2/38      Method Type: PRATIK          Radiology: reviewed

## 2022-12-05 NOTE — PROGRESS NOTE ADULT - SUBJECTIVE AND OBJECTIVE BOX
INTERVAL HPI/OVERNIGHT EVENTS:  No new overnight event.  No N/V/D.  Tolerating diet.     MEDICATIONS  (STANDING):  albuterol    90 MICROgram(s) HFA Inhaler 2 Puff(s) Inhalation two times a day  chlorhexidine 0.12% Liquid 15 milliLiter(s) Oral Mucosa every 12 hours  chlorhexidine 2% Cloths 1 Application(s) Topical daily  dextrose 5%. 1000 milliLiter(s) (50 mL/Hr) IV Continuous <Continuous>  dextrose 5%. 1000 milliLiter(s) (100 mL/Hr) IV Continuous <Continuous>  dextrose 50% Injectable 25 Gram(s) IV Push once  dextrose 50% Injectable 12.5 Gram(s) IV Push once  dextrose 50% Injectable 25 Gram(s) IV Push once  glucagon  Injectable 1 milliGRAM(s) IntraMuscular once  heparin   Injectable 5000 Unit(s) SubCutaneous every 12 hours  insulin glargine Injectable (LANTUS) 8 Unit(s) SubCutaneous every morning  insulin lispro (ADMELOG) corrective regimen sliding scale   SubCutaneous every 6 hours  LORazepam     Tablet 1 milliGRAM(s) Oral every 4 hours  midodrine 15 milliGRAM(s) Oral every 8 hours  pantoprazole  Injectable 40 milliGRAM(s) IV Push daily  polyethylene glycol 3350 17 Gram(s) Oral every 12 hours  povidone iodine 10% Solution 1 Application(s) Topical daily  senna 2 Tablet(s) Oral at bedtime    MEDICATIONS  (PRN):  dextrose Oral Gel 15 Gram(s) Oral once PRN Blood Glucose LESS THAN 70 milliGRAM(s)/deciliter  fentaNYL    Injectable 50 MICROGram(s) IV Push every 6 hours PRN Tachypnea and Vent dyssynchrony  LORazepam   Injectable 2 milliGRAM(s) IV Push every 6 hours PRN Agitation  sodium chloride 0.9% lock flush 10 milliLiter(s) IV Push every 1 hour PRN Pre/post blood products, medications, blood draw, and to maintain line patency      Allergies    codeine (Hives)    Intolerances        Review of Systems:    General:  No wt loss, fevers, chills, night sweats,fatigue,   Eyes:  Good vision, no reported pain  ENT:  No sore throat, pain, runny nose, dysphagia  CV:  No pain, palpitatioins, hypo/hypertension  Resp:  No dyspnea, cough, tachypnea, wheezing  GI:  No pain, No nausea, No vomiting, No diarrhea, No constipatiion, No weight loss, No fever, No pruritis, No rectal bleeding, No tarry stools, No dysphagia,  :  No pain, bleeding, incontinence, nocturia  Muscle:  No pain, weakness  Neuro:  No weakness, tingling, memory problems  Psych:  No fatigue, insomnia, mood problems, depression  Endocrine:  No polyuria, polydypsia, cold/heat intolerance  Heme:  No petechiae, ecchymosis, easy bruisability  Skin:  No rash, tattoos, scars, edema      Vital Signs Last 24 Hrs  T(C): 37.1 (05 Dec 2022 15:42), Max: 37.3 (05 Dec 2022 13:55)  T(F): 98.8 (05 Dec 2022 15:42), Max: 99.1 (05 Dec 2022 13:55)  HR: 90 (05 Dec 2022 20:47) (64 - 100)  BP: 140/74 (05 Dec 2022 18:00) (94/52 - 155/65)  BP(mean): 91 (05 Dec 2022 18:00) (65 - 92)  RR: 37 (05 Dec 2022 20:47) (16 - 42)  SpO2: 96% (05 Dec 2022 20:47) (94% - 100%)    Parameters below as of 05 Dec 2022 20:47  Patient On (Oxygen Delivery Method): ventilator    O2 Concentration (%): 100    PHYSICAL EXAM:    Constitutional: NAD, well-developed  HEENT: EOMI, throat clear  Neck: No LAD, supple  Respiratory: CTA and P  Cardiovascular: S1 and S2, RRR, no M  Gastrointestinal: BS+, soft, NT/ND, neg HSM,  Extremities: No peripheral edema, neg clubing, cyanosis  Vascular: 2+ peripheral pulses  Neurological: A/O x 3, no focal deficits  Psychiatric: Normal mood, normal affect  Skin: No rashes      LABS:                        8.0    9.21  )-----------( 166      ( 05 Dec 2022 06:20 )             27.1     12-05    139  |  103  |  23  ----------------------------<  157<H>  4.6   |  32<H>  |  0.96    Ca    8.6      05 Dec 2022 06:20  Phos  4.5     12-05  Mg     2.3     12-05    TPro  7.7  /  Alb  1.7<L>  /  TBili  0.5  /  DBili  x   /  AST  12  /  ALT  13  /  AlkPhos  171<H>  12-04          RADIOLOGY & ADDITIONAL TESTS:

## 2022-12-05 NOTE — PROGRESS NOTE ADULT - ASSESSMENT
77F with dementia, COPD with chronic respiratory failure s/p trach, cardiac arrest, CHH, quadriplegia, CVA, CKD, anemia, PEG tube, and multiple hospital admissions for respiratory distress presents to the ED BIBEMS from [facility] for diaphoresis and fever.         COPD with chronic respiratory failure s/p trach  unable to wean off 100% FiO2  poor prognosis  GOC discussion needs to be revisited  pulmonary recs appreciated  CTA was ordered but unable to perform, pt cannot tolerated laying flat        ANemia  multifacotrial/ AICD   s/p 1 unit prbc since admission   H and H stable   continue supportive care    Sepsis 2/2 VAP - meets sepsis based on fever + tachycardia + source of infection.  Lactate 1.9  peristent hypotension  Completed unasyn 12/1/22  - cont with vent settings to maintain SaO2 >92%  - cont with midodrine 15mg TID, pressors PRN  - on ativan for agitation  - MRSA neg, trach culture +acinetobacter   wean as tolerating   Trend temps and cbc    Hyperkalemia  resolved  s/p lokelma     Hyponatremia  - Na of 130 today, likely hypovolemic  - urine lytes ordered  - continue to trend    Leukocytosis  - WBC WNL  - no fevers  comleted unasyn  - continue to trend     DM2 on insulin  - cont with lantus and insulin coverage scale with FS q6h while on TF    COPD   - cont with neb treatments    Anemia of chronic disease   - GI eval - conservative management   - cont with ferrous sulfate  - trend CBC    Prognosis grave, patient remains critically ill and is at high risk for abrupt decompensation and death

## 2022-12-05 NOTE — PROGRESS NOTE ADULT - SUBJECTIVE AND OBJECTIVE BOX
Date/Time Patient Seen:  		  Referring MD:   Data Reviewed	       Patient is a 77y old  Female who presents with a chief complaint of diaphoresis and fever (04 Dec 2022 19:02)      Subjective/HPI     PAST MEDICAL & SURGICAL HISTORY:  Dementia of frontal lobe type    Aphasic stroke    Diabetes mellitus    Respiratory failure    Hypertension    GERD (gastroesophageal reflux disease)    Constipation    Respiratory failure    CVA (cerebral vascular accident)    HTN (hypertension)    DM (diabetes mellitus)    Advanced dementia    COVID-19 virus detected    Quadriplegia    Pneumonia    Type II diabetes mellitus    Hx of appendectomy    Gastrostomy in place    Tracheostomy in place    Tracheostomy tube present    Feeding by G-tube          Medication list         MEDICATIONS  (STANDING):  albuterol    90 MICROgram(s) HFA Inhaler 2 Puff(s) Inhalation two times a day  chlorhexidine 0.12% Liquid 15 milliLiter(s) Oral Mucosa every 12 hours  chlorhexidine 2% Cloths 1 Application(s) Topical daily  dextrose 5%. 1000 milliLiter(s) (100 mL/Hr) IV Continuous <Continuous>  dextrose 5%. 1000 milliLiter(s) (50 mL/Hr) IV Continuous <Continuous>  dextrose 50% Injectable 25 Gram(s) IV Push once  dextrose 50% Injectable 12.5 Gram(s) IV Push once  dextrose 50% Injectable 25 Gram(s) IV Push once  glucagon  Injectable 1 milliGRAM(s) IntraMuscular once  heparin   Injectable 5000 Unit(s) SubCutaneous every 12 hours  insulin glargine Injectable (LANTUS) 8 Unit(s) SubCutaneous every morning  insulin lispro (ADMELOG) corrective regimen sliding scale   SubCutaneous every 6 hours  LORazepam     Tablet 1 milliGRAM(s) Oral every 4 hours  midodrine 15 milliGRAM(s) Oral every 8 hours  norepinephrine Infusion 0.05 MICROgram(s)/kG/Min (5.35 mL/Hr) IV Continuous <Continuous>  pantoprazole  Injectable 40 milliGRAM(s) IV Push daily  polyethylene glycol 3350 17 Gram(s) Oral every 12 hours  povidone iodine 10% Solution 1 Application(s) Topical daily  senna 2 Tablet(s) Oral at bedtime    MEDICATIONS  (PRN):  dextrose Oral Gel 15 Gram(s) Oral once PRN Blood Glucose LESS THAN 70 milliGRAM(s)/deciliter  fentaNYL    Injectable 50 MICROGram(s) IV Push every 6 hours PRN Tachypnea and Vent dyssynchrony  LORazepam   Injectable 2 milliGRAM(s) IV Push every 6 hours PRN Agitation  sodium chloride 0.9% lock flush 10 milliLiter(s) IV Push every 1 hour PRN Pre/post blood products, medications, blood draw, and to maintain line patency         Vitals log        ICU Vital Signs Last 24 Hrs  T(C): 36.4 (05 Dec 2022 04:00), Max: 36.8 (04 Dec 2022 08:30)  T(F): 97.6 (05 Dec 2022 04:00), Max: 98.3 (04 Dec 2022 08:30)  HR: 82 (05 Dec 2022 06:00) (64 - 100)  BP: 123/64 (05 Dec 2022 06:00) (85/47 - 151/67)  BP(mean): 82 (05 Dec 2022 06:00) (61 - 92)  ABP: --  ABP(mean): --  RR: 36 (05 Dec 2022 06:00) (16 - 37)  SpO2: 96% (05 Dec 2022 06:00) (96% - 100%)    O2 Parameters below as of 05 Dec 2022 06:00  Patient On (Oxygen Delivery Method): ventilator    O2 Concentration (%): 100         Mode: AC/ CMV (Assist Control/ Continuous Mandatory Ventilation)  RR (machine): 18  TV (machine): 350  FiO2: 100  PEEP: 5  ITime: 1  MAP: 16  PIP: 41      Input and Output:  I&O's Detail    03 Dec 2022 07:01  -  04 Dec 2022 07:00  --------------------------------------------------------  IN:    Enteral Tube Flush: 250 mL    Free Water: 250 mL    Glucerna 1.5: 600 mL  Total IN: 1100 mL    OUT:    Incontinent per Collection Bag (mL): 300 mL    Norepinephrine: 0 mL  Total OUT: 300 mL    Total NET: 800 mL      04 Dec 2022 07:01  -  05 Dec 2022 06:35  --------------------------------------------------------  IN:    Enteral Tube Flush: 300 mL    Glucerna 1.5: 600 mL    IV PiggyBack: 250 mL  Total IN: 1150 mL    OUT:    Incontinent per Collection Bag (mL): 350 mL  Total OUT: 350 mL    Total NET: 800 mL          Lab Data                        8.0    9.21  )-----------( 166      ( 05 Dec 2022 06:20 )             27.1     12-04    139  |  103  |  22  ----------------------------<  160<H>  4.7   |  33<H>  |  1.03    Ca    8.7      04 Dec 2022 05:45  Phos  4.0     12-04  Mg     2.4     12-04    TPro  7.7  /  Alb  1.7<L>  /  TBili  0.5  /  DBili  x   /  AST  12  /  ALT  13  /  AlkPhos  171<H>  12-04            Review of Systems	      Objective     Physical Examination    heart s1s2  lung dec BS      Pertinent Lab findings & Imaging      Annalise:  NO   Adequate UO     I&O's Detail    03 Dec 2022 07:01  -  04 Dec 2022 07:00  --------------------------------------------------------  IN:    Enteral Tube Flush: 250 mL    Free Water: 250 mL    Glucerna 1.5: 600 mL  Total IN: 1100 mL    OUT:    Incontinent per Collection Bag (mL): 300 mL    Norepinephrine: 0 mL  Total OUT: 300 mL    Total NET: 800 mL      04 Dec 2022 07:01  -  05 Dec 2022 06:35  --------------------------------------------------------  IN:    Enteral Tube Flush: 300 mL    Glucerna 1.5: 600 mL    IV PiggyBack: 250 mL  Total IN: 1150 mL    OUT:    Incontinent per Collection Bag (mL): 350 mL  Total OUT: 350 mL    Total NET: 800 mL               Discussed with:     Cultures:	        Radiology

## 2022-12-05 NOTE — PROGRESS NOTE ADULT - SUBJECTIVE AND OBJECTIVE BOX
BREA BECKHAM     [facility] 03    Allergies    codeine (Hives)    Intolerances        PAST MEDICAL & SURGICAL HISTORY:  Dementia of frontal lobe type      Aphasic stroke      Diabetes mellitus      Respiratory failure      Hypertension      GERD (gastroesophageal reflux disease)      Constipation      Respiratory failure      CVA (cerebral vascular accident)      HTN (hypertension)      DM (diabetes mellitus)      Advanced dementia      COVID-19 virus detected      Quadriplegia      Pneumonia      Type II diabetes mellitus      Hx of appendectomy      Gastrostomy in place      Tracheostomy in place      Tracheostomy tube present      Feeding by G-tube          FAMILY HISTORY:  No pertinent family history in first degree relatives        Home Medications:  Admelog 100 units/mL injectable solution: injectable every 6 hours SS (21 Nov 2022 05:08)  albuterol 90 mcg/inh inhalation aerosol with adapter: 2  inhaled every 6 hours (21 Nov 2022 04:24)  Bacid (LAC) oral tablet: 2 tab(s) by gastrostomy tube once a day (21 Nov 2022 04:24)  bacitracin 500 units/g topical ointment: Apply topically to affected area once a day to knees (21 Nov 2022 04:24)  chlorhexidine 0.12% mucous membrane liquid: 15 milliliter(s) mucous membrane 2 times a day (21 Nov 2022 04:24)  Dakins Full Strength 0.5% topical solution: Apply topically to affected area 2 times a day then apply santyl and calcium alginate (21 Nov 2022 04:24)  Eucerin topical cream: Apply topically to affected area once a day bilateral feet (21 Nov 2022 04:24)  ferrous sulfate 325 mg (65 mg elemental iron) oral tablet: 1 tab(s) by gastrostomy tube once a day (21 Nov 2022 04:24)  insulin glargine 100 units/mL subcutaneous solution: 8 unit(s) subcutaneous once a day (in the morning) (21 Nov 2022 04:24)  ipratropium-albuterol 0.5 mg-2.5 mg/3 mL inhalation solution: 3 milliliter(s) inhaled every 6 hours (21 Nov 2022 04:24)  LORazepam 1 mg oral tablet: 1 tab(s) by gastrostomy tube every 4 hours (21 Nov 2022 04:24)  midodrine 10 mg oral tablet: 1 tab(s) by gastrostomy tube every 8 hours (21 Nov 2022 04:24)  MiraLax oral powder for reconstitution: orally once a day (at bedtime) (21 Nov 2022 05:08)  mulivitamin: by gastrostomy tube once a day (21 Nov 2022 04:24)  nystatin 100,000 units/g topical powder: 1 application topically 3 times a day (21 Nov 2022 04:24)  omeprazole 40 mg oral delayed release capsule: 1 cap(s) by gastrostomy tube 2 times a day (21 Nov 2022 04:24)  polyethylene glycol 3350 oral powder for reconstitution: 17 gram(s) by gastrostomy tube every 12 hours (21 Nov 2022 04:24)  senna 8.6 mg oral tablet: 3 tab(s) by gastrostomy tube once a day (at bedtime) (21 Nov 2022 04:24)  simethicone 80 mg oral tablet, chewable: 1 tab(s) by gastrostomy tube every 6 hours (21 Nov 2022 04:24)  sucralfate 1 g/10 mL oral suspension: 10 milliliter(s) g-tube 4 times a day (before meals and at bedtime) (21 Nov 2022 04:24)  Tylenol 325 mg oral tablet: 2 tab(s) orally every 6 hours, As Needed prior to dressing change (21 Nov 2022 04:24)      MEDICATIONS  (STANDING):  albuterol    90 MICROgram(s) HFA Inhaler 2 Puff(s) Inhalation two times a day  chlorhexidine 0.12% Liquid 15 milliLiter(s) Oral Mucosa every 12 hours  chlorhexidine 2% Cloths 1 Application(s) Topical daily  dextrose 5%. 1000 milliLiter(s) (100 mL/Hr) IV Continuous <Continuous>  dextrose 5%. 1000 milliLiter(s) (50 mL/Hr) IV Continuous <Continuous>  dextrose 50% Injectable 25 Gram(s) IV Push once  dextrose 50% Injectable 12.5 Gram(s) IV Push once  dextrose 50% Injectable 25 Gram(s) IV Push once  glucagon  Injectable 1 milliGRAM(s) IntraMuscular once  heparin   Injectable 5000 Unit(s) SubCutaneous every 12 hours  insulin glargine Injectable (LANTUS) 8 Unit(s) SubCutaneous every morning  insulin lispro (ADMELOG) corrective regimen sliding scale   SubCutaneous every 6 hours  LORazepam     Tablet 1 milliGRAM(s) Oral every 4 hours  midodrine 15 milliGRAM(s) Oral every 8 hours  norepinephrine Infusion 0.05 MICROgram(s)/kG/Min (5.35 mL/Hr) IV Continuous <Continuous>  pantoprazole  Injectable 40 milliGRAM(s) IV Push daily  polyethylene glycol 3350 17 Gram(s) Oral every 12 hours  povidone iodine 10% Solution 1 Application(s) Topical daily  senna 2 Tablet(s) Oral at bedtime    MEDICATIONS  (PRN):  dextrose Oral Gel 15 Gram(s) Oral once PRN Blood Glucose LESS THAN 70 milliGRAM(s)/deciliter  fentaNYL    Injectable 50 MICROGram(s) IV Push every 6 hours PRN Tachypnea and Vent dyssynchrony  LORazepam   Injectable 2 milliGRAM(s) IV Push every 6 hours PRN Agitation  sodium chloride 0.9% lock flush 10 milliLiter(s) IV Push every 1 hour PRN Pre/post blood products, medications, blood draw, and to maintain line patency      Diet, NPO with Tube Feed:   Tube Feeding Modality: Gastrostomy  Glucerna 1.5 Horacio  Total Volume for 24 Hours (mL): 1000  Continuous  Starting Tube Feed Rate mL per Hour: 10  Increase Tube Feed Rate by (mL): 10     Every 10 hours  Until Goal Tube Feed Rate (mL per Hour): 50  Tube Feed Duration (in Hours): 20  Tube Feed Start Time: 17:00  Free Water Flush  Pump   Rate (mL per Hour): 25   Frequency: Every Hour    Duration (Hours): 24 (11-21-22 @ 05:10) [Active]          Vital Signs Last 24 Hrs  T(C): 36.9 (05 Dec 2022 08:18), Max: 36.9 (05 Dec 2022 08:18)  T(F): 98.4 (05 Dec 2022 08:18), Max: 98.4 (05 Dec 2022 08:18)  HR: 81 (05 Dec 2022 08:00) (64 - 100)  BP: 155/65 (05 Dec 2022 08:00) (85/47 - 155/65)  BP(mean): 86 (05 Dec 2022 08:00) (61 - 92)  RR: 38 (05 Dec 2022 08:00) (16 - 38)  SpO2: 97% (05 Dec 2022 08:00) (96% - 100%)    Parameters below as of 05 Dec 2022 08:00  Patient On (Oxygen Delivery Method): ventilator    O2 Concentration (%): 100      12-04-22 @ 07:01  -  12-05-22 @ 07:00  --------------------------------------------------------  IN: 1150 mL / OUT: 350 mL / NET: 800 mL    12-05-22 @ 07:01  -  12-05-22 @ 08:33  --------------------------------------------------------  IN: 150 mL / OUT: 0 mL / NET: 150 mL        Mode: AC/ CMV (Assist Control/ Continuous Mandatory Ventilation), RR (machine): 18, TV (machine): 350, FiO2: 100, PEEP: 5, ITime: 1, MAP: 20, PIP: 45      LABS:                        8.0    9.21  )-----------( 166      ( 05 Dec 2022 06:20 )             27.1     12-05    139  |  103  |  23  ----------------------------<  157<H>  4.6   |  32<H>  |  0.96    Ca    8.6      05 Dec 2022 06:20  Phos  4.5     12-05  Mg     2.3     12-05    TPro  7.7  /  Alb  1.7<L>  /  TBili  0.5  /  DBili  x   /  AST  12  /  ALT  13  /  AlkPhos  171<H>  12-04              WBC:  WBC Count: 9.21 K/uL (12-05 @ 06:20)  WBC Count: 14.63 K/uL (12-04 @ 05:45)  WBC Count: 8.87 K/uL (12-03 @ 06:27)  WBC Count: 8.74 K/uL (12-02 @ 07:25)  WBC Count: 5.85 K/uL (12-01 @ 12:00)      MICROBIOLOGY:  RECENT CULTURES:                  Sodium:  Sodium, Serum: 139 mmol/L (12-05 @ 06:20)  Sodium, Serum: 139 mmol/L (12-04 @ 05:45)  Sodium, Serum: 141 mmol/L (12-03 @ 06:27)  Sodium, Serum: 140 mmol/L (12-02 @ 07:25)      0.96 mg/dL 12-05 @ 06:20  1.03 mg/dL 12-04 @ 05:45  0.94 mg/dL 12-03 @ 06:27  1.08 mg/dL 12-02 @ 07:25      Hemoglobin:  Hemoglobin: 8.0 g/dL (12-05 @ 06:20)  Hemoglobin: 8.6 g/dL (12-04 @ 05:45)  Hemoglobin: 8.3 g/dL (12-03 @ 06:27)  Hemoglobin: 9.1 g/dL (12-02 @ 07:25)  Hemoglobin: 7.8 g/dL (12-01 @ 12:00)      Platelets: Platelet Count - Automated: 166 K/uL (12-05 @ 06:20)  Platelet Count - Automated: 184 K/uL (12-04 @ 05:45)  Platelet Count - Automated: 185 K/uL (12-03 @ 06:27)  Platelet Count - Automated: 214 K/uL (12-02 @ 07:25)  Platelet Count - Automated: 157 K/uL (12-01 @ 12:00)      LIVER FUNCTIONS - ( 04 Dec 2022 05:45 )  Alb: 1.7 g/dL / Pro: 7.7 g/dL / ALK PHOS: 171 U/L / ALT: 13 U/L DA / AST: 12 U/L / GGT: x                 RADIOLOGY & ADDITIONAL STUDIES:      MICROBIOLOGY:  RECENT CULTURES:

## 2022-12-05 NOTE — PROGRESS NOTE ADULT - ASSESSMENT
77F with dementia, COPD with chronic respiratory failure s/p trach, cardiac arrest, CHH, quadriplegia, CVA, CKD, anemia, PEG tube, and multiple hospital admissions for respiratory distress presents to the ED BIBEMS from [facility] for diaphoresis and fever.     Readmitted with sepsis with shock  2/2 recurent VAP.      Sputum cx 11/21 with Acinetobacter baumannii/nosocom group (Carbapenem Resistant) resistant to Bactrim  Off pressors   Remains on 100% fi02- unable to be wean down   WBC 14 --> 9, leukocytosis resolved  Sp Unasyn x 7 days ended 12/2/22    Plan:   Monitor off Abx  Trend temps and cbc  Asp precautions  Aggressive pulm toileting  Isolation per infection control policy  Vent management per ICU  Very poor prognosis  Continued GOC discussion with  per palliative team    Infectious Diseases will continue to follow. Please call with any questions.   Andressa Brantley M.D.  [facility] Division of Infectious Diseases [phone]

## 2022-12-05 NOTE — PROGRESS NOTE ADULT - ASSESSMENT
78 y/o female with pmhx of COPD s/p trach/peg, CVA with quadriplegia, CKD, anemia, cardiac arrest, multiple admission for sepsis due to recurrent VAP now admitted on 11/21 with fialure to thrive and septic shock due to VAP with carbapenem resistant acinetobacter baumannii and acute on chronic hypoxic respiratory failure with high fio2 requirements.      1. acute on chronic hypoxic respiratory failure  2. septic shock, resolved  3. failure to thrive/ severe protein malnourishment  4. VAP    NEURO: monitor off sedation  CVS: weaned off pressors. bp stable thus far on midodrine 15 mg TID  PULM: lung protective vent strategy. on 100% fio2 will attempt to slowly wean down as satting 98% as of now. hypoxemia due to multifocal infiltrates but PE definitely in differential especially considering TTE findings of new pulmonary HTN. during stay she has been unable to lay flat so CTA has been deferred. lower extremity duplex negative for DVT. would consider empiric anticoagulation, will discuss with day team.   GI: tube feeds as tolerated  RENAL: no active issues  ID: completed course of abx. ID following, monitor off abx for now trend wbc/fever curve. CVC needs to be changed/switched to peripheral access  ENDO: goal -180  HEME: heparin subq  DISPO: full code. hospice is appropriate

## 2022-12-05 NOTE — PROGRESS NOTE ADULT - ASSESSMENT
77F with dementia, COPD with chronic respiratory failure s/p trach, cardiac arrest, CHH, quadriplegia, CVA, CKD, anemia, PEG tube, and multiple hospital admissions for respiratory distress presents to the ED BIBEMS from [facility] for diaphoresis and fever.       Fentanyl added for pain PRN use  ativan  s/p IVF  vent support  planned for CT chest    GOC   pt is full code   is full code  assist with needs -   oral hygiene  skin care  recurrent admissions  long term resident of SNF  vent dep  anoxic brain injury - dementia - vegetative state

## 2022-12-05 NOTE — PROGRESS NOTE ADULT - SUBJECTIVE AND OBJECTIVE BOX
Patient is a 77y old  Female who presents with a chief complaint of diaphoresis and fever (05 Dec 2022 08:33)      INTERVAL HPI/OVERNIGHT EVENTS:    unable to get CT done,   pt's condition worsens when trying to lay her flat.     MEDICATIONS  (STANDING):  albuterol    90 MICROgram(s) HFA Inhaler 2 Puff(s) Inhalation two times a day  chlorhexidine 0.12% Liquid 15 milliLiter(s) Oral Mucosa every 12 hours  chlorhexidine 2% Cloths 1 Application(s) Topical daily  dextrose 5%. 1000 milliLiter(s) (50 mL/Hr) IV Continuous <Continuous>  dextrose 5%. 1000 milliLiter(s) (100 mL/Hr) IV Continuous <Continuous>  dextrose 50% Injectable 25 Gram(s) IV Push once  dextrose 50% Injectable 12.5 Gram(s) IV Push once  dextrose 50% Injectable 25 Gram(s) IV Push once  glucagon  Injectable 1 milliGRAM(s) IntraMuscular once  heparin   Injectable 5000 Unit(s) SubCutaneous every 12 hours  insulin glargine Injectable (LANTUS) 8 Unit(s) SubCutaneous every morning  insulin lispro (ADMELOG) corrective regimen sliding scale   SubCutaneous every 6 hours  LORazepam     Tablet 1 milliGRAM(s) Oral every 4 hours  midodrine 15 milliGRAM(s) Oral every 8 hours  norepinephrine Infusion 0.05 MICROgram(s)/kG/Min (5.35 mL/Hr) IV Continuous <Continuous>  pantoprazole  Injectable 40 milliGRAM(s) IV Push daily  polyethylene glycol 3350 17 Gram(s) Oral every 12 hours  povidone iodine 10% Solution 1 Application(s) Topical daily  senna 2 Tablet(s) Oral at bedtime    MEDICATIONS  (PRN):  dextrose Oral Gel 15 Gram(s) Oral once PRN Blood Glucose LESS THAN 70 milliGRAM(s)/deciliter  fentaNYL    Injectable 50 MICROGram(s) IV Push every 6 hours PRN Tachypnea and Vent dyssynchrony  LORazepam   Injectable 2 milliGRAM(s) IV Push every 6 hours PRN Agitation  sodium chloride 0.9% lock flush 10 milliLiter(s) IV Push every 1 hour PRN Pre/post blood products, medications, blood draw, and to maintain line patency      Allergies    codeine (Hives)    Intolerances        REVIEW OF SYSTEMS:  as above    Vital Signs Last 24 Hrs  T(C): 36.9 (05 Dec 2022 08:18), Max: 36.9 (05 Dec 2022 08:18)  T(F): 98.4 (05 Dec 2022 08:18), Max: 98.4 (05 Dec 2022 08:18)  HR: 85 (05 Dec 2022 10:00) (64 - 100)  BP: 132/67 (05 Dec 2022 10:00) (85/47 - 155/65)  BP(mean): 87 (05 Dec 2022 10:00) (61 - 92)  RR: 42 (05 Dec 2022 10:00) (16 - 42)  SpO2: 97% (05 Dec 2022 10:00) (96% - 100%)    Parameters below as of 05 Dec 2022 10:00  Patient On (Oxygen Delivery Method): ventilator    O2 Concentration (%): 100    PHYSICAL EXAM:  GENERAL: chronically ill appearing, emaciated, NAD, obtunded  HEAD: atraumatic  EYES: conjunctiva clear  NECK: (+)trach in place, clean  RESPIRATORY: mechanical breath sounds, diminished, vent in place, no gross crackles or rales  CARDIOVASCULAR:  regular rate and rhythm, no murmurs or rubs or gallops, 2+ peripheral pulses  GASTROINTESTINAL:  soft, +PEG in place, nondistended, bowel sounds present  EXTREMITIES: no clubbing or cyanosis or edema  MUSCULOSKELETAL: (+)diffuse contractures in all joints  NERVOUS SYSTEM: does not respond to pain    LABS:                        8.0    9.21  )-----------( 166      ( 05 Dec 2022 06:20 )             27.1     05 Dec 2022 06:20    139    |  103    |  23     ----------------------------<  157    4.6     |  32     |  0.96     Ca    8.6        05 Dec 2022 06:20  Phos  4.5       05 Dec 2022 06:20  Mg     2.3       05 Dec 2022 06:20          CAPILLARY BLOOD GLUCOSE      POCT Blood Glucose.: 159 mg/dL (05 Dec 2022 05:09)  POCT Blood Glucose.: 156 mg/dL (05 Dec 2022 00:28)  POCT Blood Glucose.: 133 mg/dL (04 Dec 2022 17:29)  POCT Blood Glucose.: 158 mg/dL (04 Dec 2022 11:55)      RADIOLOGY & ADDITIONAL TESTS:

## 2022-12-06 LAB
ALBUMIN SERPL ELPH-MCNC: 1.6 G/DL — LOW (ref 3.3–5)
ALP SERPL-CCNC: 206 U/L — HIGH (ref 30–120)
ALT FLD-CCNC: 10 U/L DA — SIGNIFICANT CHANGE UP (ref 10–60)
ANION GAP SERPL CALC-SCNC: 6 MMOL/L — SIGNIFICANT CHANGE UP (ref 5–17)
AST SERPL-CCNC: 24 U/L — SIGNIFICANT CHANGE UP (ref 10–40)
BASOPHILS # BLD AUTO: 0.01 K/UL — SIGNIFICANT CHANGE UP (ref 0–0.2)
BASOPHILS NFR BLD AUTO: 0.1 % — SIGNIFICANT CHANGE UP (ref 0–2)
BILIRUB SERPL-MCNC: 0.5 MG/DL — SIGNIFICANT CHANGE UP (ref 0.2–1.2)
BUN SERPL-MCNC: 22 MG/DL — SIGNIFICANT CHANGE UP (ref 7–23)
CALCIUM SERPL-MCNC: 8.6 MG/DL — SIGNIFICANT CHANGE UP (ref 8.4–10.5)
CHLORIDE SERPL-SCNC: 101 MMOL/L — SIGNIFICANT CHANGE UP (ref 96–108)
CO2 SERPL-SCNC: 30 MMOL/L — SIGNIFICANT CHANGE UP (ref 22–31)
CREAT SERPL-MCNC: 0.88 MG/DL — SIGNIFICANT CHANGE UP (ref 0.5–1.3)
EGFR: 67 ML/MIN/1.73M2 — SIGNIFICANT CHANGE UP
EOSINOPHIL # BLD AUTO: 0.1 K/UL — SIGNIFICANT CHANGE UP (ref 0–0.5)
EOSINOPHIL NFR BLD AUTO: 1.2 % — SIGNIFICANT CHANGE UP (ref 0–6)
GLUCOSE SERPL-MCNC: 157 MG/DL — HIGH (ref 70–99)
HCT VFR BLD CALC: 28.1 % — LOW (ref 34.5–45)
HGB BLD-MCNC: 8.3 G/DL — LOW (ref 11.5–15.5)
IMM GRANULOCYTES NFR BLD AUTO: 0.6 % — SIGNIFICANT CHANGE UP (ref 0–0.9)
LYMPHOCYTES # BLD AUTO: 0.67 K/UL — LOW (ref 1–3.3)
LYMPHOCYTES # BLD AUTO: 7.7 % — LOW (ref 13–44)
MAGNESIUM SERPL-MCNC: 2 MG/DL — SIGNIFICANT CHANGE UP (ref 1.6–2.6)
MCHC RBC-ENTMCNC: 27.7 PG — SIGNIFICANT CHANGE UP (ref 27–34)
MCHC RBC-ENTMCNC: 29.5 GM/DL — LOW (ref 32–36)
MCV RBC AUTO: 93.7 FL — SIGNIFICANT CHANGE UP (ref 80–100)
MONOCYTES # BLD AUTO: 0.58 K/UL — SIGNIFICANT CHANGE UP (ref 0–0.9)
MONOCYTES NFR BLD AUTO: 6.7 % — SIGNIFICANT CHANGE UP (ref 2–14)
NEUTROPHILS # BLD AUTO: 7.27 K/UL — SIGNIFICANT CHANGE UP (ref 1.8–7.4)
NEUTROPHILS NFR BLD AUTO: 83.7 % — HIGH (ref 43–77)
NRBC # BLD: 0 /100 WBCS — SIGNIFICANT CHANGE UP (ref 0–0)
PHOSPHATE SERPL-MCNC: 4.3 MG/DL — SIGNIFICANT CHANGE UP (ref 2.5–4.5)
PLATELET # BLD AUTO: 212 K/UL — SIGNIFICANT CHANGE UP (ref 150–400)
POTASSIUM SERPL-MCNC: 5 MMOL/L — SIGNIFICANT CHANGE UP (ref 3.5–5.3)
POTASSIUM SERPL-SCNC: 5 MMOL/L — SIGNIFICANT CHANGE UP (ref 3.5–5.3)
PROT SERPL-MCNC: 7.5 G/DL — SIGNIFICANT CHANGE UP (ref 6–8.3)
RBC # BLD: 3 M/UL — LOW (ref 3.8–5.2)
RBC # FLD: 16 % — HIGH (ref 10.3–14.5)
SARS-COV-2 RNA SPEC QL NAA+PROBE: SIGNIFICANT CHANGE UP
SODIUM SERPL-SCNC: 137 MMOL/L — SIGNIFICANT CHANGE UP (ref 135–145)
WBC # BLD: 8.68 K/UL — SIGNIFICANT CHANGE UP (ref 3.8–10.5)
WBC # FLD AUTO: 8.68 K/UL — SIGNIFICANT CHANGE UP (ref 3.8–10.5)

## 2022-12-06 PROCEDURE — 99232 SBSQ HOSP IP/OBS MODERATE 35: CPT

## 2022-12-06 PROCEDURE — 71045 X-RAY EXAM CHEST 1 VIEW: CPT | Mod: 26

## 2022-12-06 PROCEDURE — 76604 US EXAM CHEST: CPT | Mod: 26

## 2022-12-06 RX ORDER — MIDAZOLAM HYDROCHLORIDE 1 MG/ML
2 INJECTION, SOLUTION INTRAMUSCULAR; INTRAVENOUS ONCE
Refills: 0 | Status: DISCONTINUED | OUTPATIENT
Start: 2022-12-06 | End: 2022-12-06

## 2022-12-06 RX ORDER — MIDAZOLAM HYDROCHLORIDE 1 MG/ML
2 INJECTION, SOLUTION INTRAMUSCULAR; INTRAVENOUS
Refills: 0 | Status: DISCONTINUED | OUTPATIENT
Start: 2022-12-06 | End: 2022-12-08

## 2022-12-06 RX ORDER — MIDODRINE HYDROCHLORIDE 2.5 MG/1
5 TABLET ORAL EVERY 8 HOURS
Refills: 0 | Status: DISCONTINUED | OUTPATIENT
Start: 2022-12-06 | End: 2022-12-07

## 2022-12-06 RX ADMIN — Medication 2: at 00:35

## 2022-12-06 RX ADMIN — Medication 1 MILLIGRAM(S): at 05:15

## 2022-12-06 RX ADMIN — MIDAZOLAM HYDROCHLORIDE 2 MILLIGRAM(S): 1 INJECTION, SOLUTION INTRAMUSCULAR; INTRAVENOUS at 20:18

## 2022-12-06 RX ADMIN — Medication 2: at 17:34

## 2022-12-06 RX ADMIN — Medication 1 MILLIGRAM(S): at 17:34

## 2022-12-06 RX ADMIN — Medication 2 MILLIGRAM(S): at 18:43

## 2022-12-06 RX ADMIN — ALBUTEROL 2 PUFF(S): 90 AEROSOL, METERED ORAL at 20:52

## 2022-12-06 RX ADMIN — HEPARIN SODIUM 5000 UNIT(S): 5000 INJECTION INTRAVENOUS; SUBCUTANEOUS at 17:34

## 2022-12-06 RX ADMIN — FENTANYL CITRATE 50 MICROGRAM(S): 50 INJECTION INTRAVENOUS at 16:12

## 2022-12-06 RX ADMIN — Medication 2: at 05:37

## 2022-12-06 RX ADMIN — HEPARIN SODIUM 5000 UNIT(S): 5000 INJECTION INTRAVENOUS; SUBCUTANEOUS at 05:14

## 2022-12-06 RX ADMIN — Medication 1 MILLIGRAM(S): at 09:40

## 2022-12-06 RX ADMIN — FENTANYL CITRATE 50 MICROGRAM(S): 50 INJECTION INTRAVENOUS at 16:27

## 2022-12-06 RX ADMIN — CHLORHEXIDINE GLUCONATE 15 MILLILITER(S): 213 SOLUTION TOPICAL at 17:34

## 2022-12-06 RX ADMIN — Medication 2: at 13:03

## 2022-12-06 RX ADMIN — Medication 1 APPLICATION(S): at 13:04

## 2022-12-06 RX ADMIN — POLYETHYLENE GLYCOL 3350 17 GRAM(S): 17 POWDER, FOR SOLUTION ORAL at 05:15

## 2022-12-06 RX ADMIN — INSULIN GLARGINE 8 UNIT(S): 100 INJECTION, SOLUTION SUBCUTANEOUS at 07:32

## 2022-12-06 RX ADMIN — CHLORHEXIDINE GLUCONATE 15 MILLILITER(S): 213 SOLUTION TOPICAL at 05:14

## 2022-12-06 RX ADMIN — Medication 1 MILLIGRAM(S): at 02:41

## 2022-12-06 RX ADMIN — Medication 1 MILLIGRAM(S): at 14:36

## 2022-12-06 RX ADMIN — PANTOPRAZOLE SODIUM 40 MILLIGRAM(S): 20 TABLET, DELAYED RELEASE ORAL at 13:04

## 2022-12-06 RX ADMIN — ALBUTEROL 2 PUFF(S): 90 AEROSOL, METERED ORAL at 08:11

## 2022-12-06 NOTE — PROGRESS NOTE ADULT - SUBJECTIVE AND OBJECTIVE BOX
Patient is a 77y old  Female who presents with a chief complaint of diaphoresis and fever (06 Dec 2022 14:11)      BRIEF HOSPITAL COURSE: 78 y/o female with pmhx of COPD s/p trach/peg, CVA with quadriplegia, CKD, anemia, cardiac arrest, multiple admission for sepsis due to recurrent VAP now admitted on 11/21 with failure to thrive and septic shock due to VAP with carbapenem resistant acinetobacter baumannii and acute on chronic hypoxic respiratory failure with high fio2 requirements. Patient remains on 100% fio2, peep of 5. satting 96%      Events last 24 hours: Patient remains on 100% FIO2 w/SPO2 mid 90s. Patient requiring additional PRN sedation for vent synchrony. Copious, thick secretions noted in oropharynx. Patient remains too unstable for CTA at this time.    Unable to perform ROS 2/2 patient's current state     PAST MEDICAL & SURGICAL HISTORY:  Dementia of frontal lobe type      Aphasic stroke      Diabetes mellitus      Respiratory failure      Hypertension      GERD (gastroesophageal reflux disease)      Constipation      Respiratory failure      CVA (cerebral vascular accident)      HTN (hypertension)      DM (diabetes mellitus)      Advanced dementia      COVID-19 virus detected      Quadriplegia      Pneumonia      Type II diabetes mellitus      Hx of appendectomy      Gastrostomy in place      Tracheostomy in place      Tracheostomy tube present      Feeding by G-tube            Medications:    midodrine 5 milliGRAM(s) Oral every 8 hours    albuterol    90 MICROgram(s) HFA Inhaler 2 Puff(s) Inhalation two times a day    fentaNYL    Injectable 50 MICROGram(s) IV Push every 6 hours PRN  LORazepam     Tablet 1 milliGRAM(s) Oral every 4 hours  midazolam Injectable 2 milliGRAM(s) IV Push once      heparin   Injectable 5000 Unit(s) SubCutaneous every 12 hours    pantoprazole  Injectable 40 milliGRAM(s) IV Push daily  polyethylene glycol 3350 17 Gram(s) Oral every 12 hours  senna 2 Tablet(s) Oral at bedtime      dextrose 50% Injectable 25 Gram(s) IV Push once  dextrose 50% Injectable 12.5 Gram(s) IV Push once  dextrose 50% Injectable 25 Gram(s) IV Push once  dextrose Oral Gel 15 Gram(s) Oral once PRN  glucagon  Injectable 1 milliGRAM(s) IntraMuscular once  insulin glargine Injectable (LANTUS) 8 Unit(s) SubCutaneous every morning  insulin lispro (ADMELOG) corrective regimen sliding scale   SubCutaneous every 6 hours    dextrose 5%. 1000 milliLiter(s) IV Continuous <Continuous>  dextrose 5%. 1000 milliLiter(s) IV Continuous <Continuous>  sodium chloride 0.9% lock flush 10 milliLiter(s) IV Push every 1 hour PRN      chlorhexidine 0.12% Liquid 15 milliLiter(s) Oral Mucosa every 12 hours  chlorhexidine 2% Cloths 1 Application(s) Topical daily  povidone iodine 10% Solution 1 Application(s) Topical daily        Mode: AC/ CMV (Assist Control/ Continuous Mandatory Ventilation)  RR (machine): 18  TV (machine): 350  FiO2: 80  PEEP: 5  ITime: 1  MAP: 21  PIP: 46      ICU Vital Signs Last 24 Hrs  T(C): 37.4 (06 Dec 2022 16:10), Max: 37.4 (06 Dec 2022 16:10)  T(F): 99.4 (06 Dec 2022 16:10), Max: 99.4 (06 Dec 2022 16:10)  HR: 108 (06 Dec 2022 18:00) (85 - 108)  BP: 172/84 (06 Dec 2022 18:00) (136/67 - 172/84)  BP(mean): 109 (06 Dec 2022 18:00) (88 - 109)  ABP: --  ABP(mean): --  RR: 41 (06 Dec 2022 18:00) (19 - 42)  SpO2: 97% (06 Dec 2022 18:00) (91% - 100%)    O2 Parameters below as of 06 Dec 2022 18:00  Patient On (Oxygen Delivery Method): ventilator    O2 Concentration (%): 100            I&O's Detail    05 Dec 2022 07:01  -  06 Dec 2022 07:00  --------------------------------------------------------  IN:    Enteral Tube Flush: 300 mL    Free Water: 90 mL    Glucerna 1.5: 800 mL  Total IN: 1190 mL    OUT:  Total OUT: 0 mL    Total NET: 1190 mL      06 Dec 2022 07:01  -  06 Dec 2022 19:57  --------------------------------------------------------  IN:    Enteral Tube Flush: 275 mL    Free Water: 90 mL    Glucerna 1.5: 550 mL  Total IN: 915 mL    OUT:    Incontinent per Collection Bag (mL): 300 mL  Total OUT: 300 mL    Total NET: 615 mL            LABS:                        8.3    8.68  )-----------( 212      ( 06 Dec 2022 06:36 )             28.1     12-06    137  |  101  |  22  ----------------------------<  157<H>  5.0   |  30  |  0.88    Ca    8.6      06 Dec 2022 06:36  Phos  4.3     12-06  Mg     2.0     12-06    TPro  7.5  /  Alb  1.6<L>  /  TBili  0.5  /  DBili  x   /  AST  24  /  ALT  10  /  AlkPhos  206<H>  12-06          CAPILLARY BLOOD GLUCOSE      POCT Blood Glucose.: 154 mg/dL (06 Dec 2022 17:33)        CULTURES:      Physical Examination:    General: +Trach, RT IJ CVC     HEENT: Atraumatic, pupils equal, reactive to light.  Symmetric.    PULM: Coarse decreased breath sounds B/L with gurgling     NECK: Supple, no lymphadenopathy, +trach    CVS: S1, S2, no murmurs, rubs, or gallops    ABD: Soft, nondistended, nontender, normoactive bowel sounds, no masses, +PEG noted to have surrounding drainage at site possible tube feed    EXT: B/L LE edema    SKIN: Warm and well perfused    NEURO: Does not track, follow simple commands, no purposeful movements noted     DEVICES:     RADIOLOGY: Too unstable for CTA     Critical Care time: 35 mins assessing presenting problems of acute illness that poses high probability of life threatening deterioration or end organ damage/dysfunction.  Medical decision making including initiating plan of care, reviewing data, reviewing radiology, direct patient bedside evaluation and interpretation of vital signs, any necessary ventilator management, discussion with multidisciplinary team, discussing goals of care with patient/family, all non inclusive of procedures Patient is a 77y old  Female who presents with a chief complaint of diaphoresis and fever (06 Dec 2022 14:11)      BRIEF HOSPITAL COURSE: 78 y/o female with pmhx of COPD s/p trach/peg, CVA with quadriplegia, CKD, anemia, cardiac arrest, multiple admission for sepsis due to recurrent VAP now admitted on 11/21 with failure to thrive and septic shock due to VAP with carbapenem resistant acinetobacter baumannii and acute on chronic hypoxic respiratory failure with high fio2 requirements. Patient remains on 100% fio2, peep of 5. satting 96%      Events last 24 hours: Patient remains on 100% FIO2 w/SPO2 mid 90s. Patient requiring additional PRN sedation for vent synchrony. Copious, thick secretions noted in oropharynx. Patient remains too unstable for CTA at this time.    Unable to perform ROS 2/2 patient's current state     PAST MEDICAL & SURGICAL HISTORY:  Dementia of frontal lobe type      Aphasic stroke      Diabetes mellitus      Respiratory failure      Hypertension      GERD (gastroesophageal reflux disease)      Constipation      Respiratory failure      CVA (cerebral vascular accident)      HTN (hypertension)      DM (diabetes mellitus)      Advanced dementia      COVID-19 virus detected      Quadriplegia      Pneumonia      Type II diabetes mellitus      Hx of appendectomy      Gastrostomy in place      Tracheostomy in place      Tracheostomy tube present      Feeding by G-tube            Medications:    midodrine 5 milliGRAM(s) Oral every 8 hours    albuterol    90 MICROgram(s) HFA Inhaler 2 Puff(s) Inhalation two times a day    fentaNYL    Injectable 50 MICROGram(s) IV Push every 6 hours PRN  LORazepam     Tablet 1 milliGRAM(s) Oral every 4 hours  midazolam Injectable 2 milliGRAM(s) IV Push once      heparin   Injectable 5000 Unit(s) SubCutaneous every 12 hours    pantoprazole  Injectable 40 milliGRAM(s) IV Push daily  polyethylene glycol 3350 17 Gram(s) Oral every 12 hours  senna 2 Tablet(s) Oral at bedtime      dextrose 50% Injectable 25 Gram(s) IV Push once  dextrose 50% Injectable 12.5 Gram(s) IV Push once  dextrose 50% Injectable 25 Gram(s) IV Push once  dextrose Oral Gel 15 Gram(s) Oral once PRN  glucagon  Injectable 1 milliGRAM(s) IntraMuscular once  insulin glargine Injectable (LANTUS) 8 Unit(s) SubCutaneous every morning  insulin lispro (ADMELOG) corrective regimen sliding scale   SubCutaneous every 6 hours    dextrose 5%. 1000 milliLiter(s) IV Continuous <Continuous>  dextrose 5%. 1000 milliLiter(s) IV Continuous <Continuous>  sodium chloride 0.9% lock flush 10 milliLiter(s) IV Push every 1 hour PRN      chlorhexidine 0.12% Liquid 15 milliLiter(s) Oral Mucosa every 12 hours  chlorhexidine 2% Cloths 1 Application(s) Topical daily  povidone iodine 10% Solution 1 Application(s) Topical daily        Mode: AC/ CMV (Assist Control/ Continuous Mandatory Ventilation)  RR (machine): 18  TV (machine): 350  FiO2: 80  PEEP: 5  ITime: 1  MAP: 21  PIP: 46      ICU Vital Signs Last 24 Hrs  T(C): 37.4 (06 Dec 2022 16:10), Max: 37.4 (06 Dec 2022 16:10)  T(F): 99.4 (06 Dec 2022 16:10), Max: 99.4 (06 Dec 2022 16:10)  HR: 108 (06 Dec 2022 18:00) (85 - 108)  BP: 172/84 (06 Dec 2022 18:00) (136/67 - 172/84)  BP(mean): 109 (06 Dec 2022 18:00) (88 - 109)  ABP: --  ABP(mean): --  RR: 41 (06 Dec 2022 18:00) (19 - 42)  SpO2: 97% (06 Dec 2022 18:00) (91% - 100%)    O2 Parameters below as of 06 Dec 2022 18:00  Patient On (Oxygen Delivery Method): ventilator    O2 Concentration (%): 100            I&O's Detail    05 Dec 2022 07:01  -  06 Dec 2022 07:00  --------------------------------------------------------  IN:    Enteral Tube Flush: 300 mL    Free Water: 90 mL    Glucerna 1.5: 800 mL  Total IN: 1190 mL    OUT:  Total OUT: 0 mL    Total NET: 1190 mL      06 Dec 2022 07:01  -  06 Dec 2022 19:57  --------------------------------------------------------  IN:    Enteral Tube Flush: 275 mL    Free Water: 90 mL    Glucerna 1.5: 550 mL  Total IN: 915 mL    OUT:    Incontinent per Collection Bag (mL): 300 mL  Total OUT: 300 mL    Total NET: 615 mL            LABS:                        8.3    8.68  )-----------( 212      ( 06 Dec 2022 06:36 )             28.1     12-06    137  |  101  |  22  ----------------------------<  157<H>  5.0   |  30  |  0.88    Ca    8.6      06 Dec 2022 06:36  Phos  4.3     12-06  Mg     2.0     12-06    TPro  7.5  /  Alb  1.6<L>  /  TBili  0.5  /  DBili  x   /  AST  24  /  ALT  10  /  AlkPhos  206<H>  12-06          CAPILLARY BLOOD GLUCOSE      POCT Blood Glucose.: 154 mg/dL (06 Dec 2022 17:33)        CULTURES:      Physical Examination:    General: +Trach, RT IJ CVC     HEENT: Atraumatic, pupils equal, reactive to light.  Symmetric.    PULM: Coarse decreased breath sounds B/L with gurgling     NECK: Supple, no lymphadenopathy, +trach    CVS: S1, S2, no murmurs, rubs, or gallops    ABD: Soft, nondistended, nontender, normoactive bowel sounds, no masses, +PEG noted to have surrounding drainage at site possible tube feed    EXT: B/L LE edema, contracted B/L upper extremities     SKIN: Warm and well perfused    NEURO: Does not track, follow simple commands, no purposeful movements noted     DEVICES:     RADIOLOGY: Too unstable for CTA     Critical Care time: 35 mins assessing presenting problems of acute illness that poses high probability of life threatening deterioration or end organ damage/dysfunction.  Medical decision making including initiating plan of care, reviewing data, reviewing radiology, direct patient bedside evaluation and interpretation of vital signs, any necessary ventilator management, discussion with multidisciplinary team, discussing goals of care with patient/family, all non inclusive of procedures

## 2022-12-06 NOTE — PROGRESS NOTE ADULT - SUBJECTIVE AND OBJECTIVE BOX
Date/Time Patient Seen:  		  Referring MD:   Data Reviewed	       Patient is a 77y old  Female who presents with a chief complaint of diaphoresis and fever (05 Dec 2022 22:06)      Subjective/HPI     PAST MEDICAL & SURGICAL HISTORY:  Dementia of frontal lobe type    Aphasic stroke    Diabetes mellitus    Respiratory failure    Hypertension    GERD (gastroesophageal reflux disease)    Constipation    Respiratory failure    CVA (cerebral vascular accident)    HTN (hypertension)    DM (diabetes mellitus)    Advanced dementia    COVID-19 virus detected    Quadriplegia    Pneumonia    Type II diabetes mellitus    Hx of appendectomy    Gastrostomy in place    Tracheostomy in place    Tracheostomy tube present    Feeding by G-tube          Medication list         MEDICATIONS  (STANDING):  albuterol    90 MICROgram(s) HFA Inhaler 2 Puff(s) Inhalation two times a day  chlorhexidine 0.12% Liquid 15 milliLiter(s) Oral Mucosa every 12 hours  chlorhexidine 2% Cloths 1 Application(s) Topical daily  dextrose 5%. 1000 milliLiter(s) (50 mL/Hr) IV Continuous <Continuous>  dextrose 5%. 1000 milliLiter(s) (100 mL/Hr) IV Continuous <Continuous>  dextrose 50% Injectable 25 Gram(s) IV Push once  dextrose 50% Injectable 12.5 Gram(s) IV Push once  dextrose 50% Injectable 25 Gram(s) IV Push once  glucagon  Injectable 1 milliGRAM(s) IntraMuscular once  heparin   Injectable 5000 Unit(s) SubCutaneous every 12 hours  insulin glargine Injectable (LANTUS) 8 Unit(s) SubCutaneous every morning  insulin lispro (ADMELOG) corrective regimen sliding scale   SubCutaneous every 6 hours  LORazepam     Tablet 1 milliGRAM(s) Oral every 4 hours  midodrine 15 milliGRAM(s) Oral every 8 hours  pantoprazole  Injectable 40 milliGRAM(s) IV Push daily  polyethylene glycol 3350 17 Gram(s) Oral every 12 hours  povidone iodine 10% Solution 1 Application(s) Topical daily  senna 2 Tablet(s) Oral at bedtime    MEDICATIONS  (PRN):  dextrose Oral Gel 15 Gram(s) Oral once PRN Blood Glucose LESS THAN 70 milliGRAM(s)/deciliter  fentaNYL    Injectable 50 MICROGram(s) IV Push every 6 hours PRN Tachypnea and Vent dyssynchrony  LORazepam   Injectable 2 milliGRAM(s) IV Push every 6 hours PRN Agitation  sodium chloride 0.9% lock flush 10 milliLiter(s) IV Push every 1 hour PRN Pre/post blood products, medications, blood draw, and to maintain line patency         Vitals log        ICU Vital Signs Last 24 Hrs  T(C): 36.9 (06 Dec 2022 03:51), Max: 37.3 (05 Dec 2022 13:55)  T(F): 98.4 (06 Dec 2022 03:51), Max: 99.1 (05 Dec 2022 13:55)  HR: 89 (06 Dec 2022 05:35) (78 - 96)  BP: 136/67 (06 Dec 2022 01:00) (122/64 - 165/75)  BP(mean): 88 (06 Dec 2022 01:00) (74 - 99)  ABP: --  ABP(mean): --  RR: 32 (06 Dec 2022 01:00) (18 - 42)  SpO2: 98% (06 Dec 2022 05:35) (91% - 99%)    O2 Parameters below as of 05 Dec 2022 20:47  Patient On (Oxygen Delivery Method): ventilator    O2 Concentration (%): 100         Mode: AC/ CMV (Assist Control/ Continuous Mandatory Ventilation)  RR (machine): 18  TV (machine): 350  FiO2: 100  PEEP: 5  ITime: 1  MAP: 22  PIP: 48      Input and Output:  I&O's Detail    05 Dec 2022 07:01  -  06 Dec 2022 07:00  --------------------------------------------------------  IN:    Enteral Tube Flush: 300 mL    Free Water: 90 mL    Glucerna 1.5: 800 mL  Total IN: 1190 mL    OUT:  Total OUT: 0 mL    Total NET: 1190 mL          Lab Data                        8.3    8.68  )-----------( 212      ( 06 Dec 2022 06:36 )             28.1     12-06    137  |  101  |  22  ----------------------------<  157<H>  5.0   |  30  |  0.88    Ca    8.6      06 Dec 2022 06:36  Phos  4.3     12-06  Mg     2.0     12-06    TPro  7.5  /  Alb  1.6<L>  /  TBili  0.5  /  DBili  x   /  AST  24  /  ALT  10  /  AlkPhos  206<H>  12-06            Review of Systems	      Objective     Physical Examination    heart s1s2  lung dc BS      Pertinent Lab findings & Imaging      Annalise:  NO   Adequate UO     I&O's Detail    05 Dec 2022 07:01  -  06 Dec 2022 07:00  --------------------------------------------------------  IN:    Enteral Tube Flush: 300 mL    Free Water: 90 mL    Glucerna 1.5: 800 mL  Total IN: 1190 mL    OUT:  Total OUT: 0 mL    Total NET: 1190 mL               Discussed with:     Cultures:	        Radiology

## 2022-12-06 NOTE — PROGRESS NOTE ADULT - SUBJECTIVE AND OBJECTIVE BOX
Chief Complaint: Respiratory distress    Interval Events: No events overnight.    Review of Systems:  Unable to obtain    Physical Exam:  Vital Signs Last 24 Hrs  T(C): 36.1 (06 Dec 2022 07:58), Max: 37.3 (05 Dec 2022 13:55)  T(F): 97 (06 Dec 2022 07:58), Max: 99.1 (05 Dec 2022 13:55)  HR: 94 (06 Dec 2022 08:00) (78 - 96)  BP: 156/73 (06 Dec 2022 08:00) (122/64 - 165/75)  BP(mean): 99 (06 Dec 2022 08:00) (74 - 99)  RR: 38 (06 Dec 2022 08:00) (18 - 42)  SpO2: 100% (06 Dec 2022 08:00) (91% - 100%)  Parameters below as of 06 Dec 2022 07:00  Patient On (Oxygen Delivery Method): ventilator  O2 Concentration (%): 100  General: Unresponsive  HEENT: MMM  Neck: No JVD, no carotid bruit  Lungs: CTAB  CV: RRR, nl S1/S2, no M/R/G  Abdomen: S/NT/ND, +BS  Extremities: 2+ LE edema, no cyanosis  Neuro: AAOx0  Skin: No rash    Labs:    12-06    137  |  101  |  22  ----------------------------<  157<H>  5.0   |  30  |  0.88    Ca    8.6      06 Dec 2022 06:36  Phos  4.3     12-06  Mg     2.0     12-06    TPro  7.5  /  Alb  1.6<L>  /  TBili  0.5  /  DBili  x   /  AST  24  /  ALT  10  /  AlkPhos  206<H>  12-06                        8.3    8.68  )-----------( 212      ( 06 Dec 2022 06:36 )             28.1       ECG/Telemetry: Sinus rhythm

## 2022-12-06 NOTE — PROGRESS NOTE ADULT - ASSESSMENT
Impression - 78 y/o female, FULL CODE, with pmhx of COPD s/p trach/peg, CVA with quadriplegia, CKD, anemia, cardiac arrest, multiple admission for sepsis due to recurrent VAP now admitted on 11/21 with failure to thrive and septic shock due to VAP with carbapenem resistant acinetobacter baumannii and acute on chronic hypoxic respiratory failure with high fio2 requirements. Patient remains on 100% fio2, peep of 5. satting 96%. Patient remains on 100% FIO2 w/SPO2 mid 90s. Patient requiring additional PRN sedation for vent synchrony. Copious, thick secretions noted in oropharynx. Patient remains too unstable for CTA at this time. Multiple GOCs have taken place with the patient's /hcp and patient remains FULL CODE, despite extremely poor prognosis. Drainage (possible tube feed/color and consistency) noted at PEG site would recommend holding tube feed until assessed by GI, already following. Will transition from PO Ativan ATC to IVP versed for vent synchrony in setting of possible PEG malfunction/dislodged.     Acute on chronic hypoxic respiratory failure  Septic shock, resolved  Failure to thrive/ severe protein malnourishment  VAP    NEURO: Currently off gtt sedation, will transition from PO ATC Ativan to IVP Versed for vent synchrony in setting of possible PEG malfunction/dislodged, if remains asynchronous would recommend low dose Precedex gtt as tolerated  CVS: Remains off pressors, BP stable MAP>75, continue on midodrine 15 mg TID  PULM: Remains on 100% fio2, unable to wean, SPO2 remains low to mid 90s, hypoxemia due to multifocal infiltrates, CTA to r/o PE in setting of TTE remarkable for new pulmonary HTN, patient remains too unstable for CTA, US B/L venous duplex negative for DVT, patient is not a candidate for AC 2/2 anemia and prior bleeds per Cardiology   GI: HOLD TF until PEG examined by GI, daily PPI IVP  RENAL: Trend bmp, trend lytes, replete as needed, strict I/Os, K+ slightly elevated today  ID: Abx completed 12/2, continue to monitor off abx, trend wbc/fever curve, CVC needs to be changed/switched to peripheral access or midline placement  ENDO: ISS protocol, glycemic goal BG<180, A1c 10/22 8.5   HEME: DVT PPx w/UFH      Impression - 76 y/o female, FULL CODE, with pmhx of COPD s/p trach/peg, CVA with quadriplegia, CKD, anemia, cardiac arrest, multiple admission for sepsis due to recurrent VAP now admitted on 11/21 with failure to thrive and septic shock due to VAP with carbapenem resistant acinetobacter baumannii and acute on chronic hypoxic respiratory failure with high fio2 requirements. Patient remains on 100% fio2, peep of 5. satting 96%. Patient remains on 100% FIO2 w/SPO2 mid 90s. Patient requiring additional PRN sedation for vent synchrony. Copious, thick secretions noted in oropharynx. Patient remains too unstable for CTA at this time. Multiple GOCs have taken place with the patient's /hcp and patient remains FULL CODE, despite extremely poor prognosis. Drainage (possible tube feed/color and consistency) noted at PEG site would recommend holding tube feed until assessed by GI, already following. Will transition from PO Ativan ATC to IVP versed for vent synchrony in setting of possible PEG malfunction/dislodged.     Acute on chronic hypoxic respiratory failure  Septic shock, resolved  Failure to thrive/ severe protein malnourishment  VAP    NEURO: Currently off gtt sedation, will transition from PO ATC Ativan to IVP Versed for vent synchrony in setting of possible PEG malfunction/dislodged, if remains asynchronous would recommend low dose Precedex gtt as tolerated  CVS: Remains off pressors, BP stable MAP>75, continue on midodrine 15 mg TID  PULM: Remains on 100% fio2, unable to wean, SPO2 remains low to mid 90s, hypoxemia due to multifocal infiltrates, CTA to r/o PE in setting of TTE remarkable for new pulmonary HTN, patient remains too unstable for CTA, US B/L venous duplex negative for DVT, patient is not a candidate for AC 2/2 anemia and prior bleeds per Cardiology   GI: HOLD TF until PEG examined by GI, daily PPI IVP  RENAL: Trend bmp, trend lytes, replete as needed, strict I/Os, K+ slightly elevated today  ID: Abx completed 12/2, continue to monitor off abx, trend wbc/fever curve, CVC needs to be changed/switched to peripheral access or midline placement  ENDO: ISS protocol, Lantus in AM, glycemic goal BG<180, A1c 10/22 8.5   HEME: DVT PPx w/UFH      Impression - 76 y/o female, FULL CODE, with pmhx of COPD s/p trach/peg, CVA with quadriplegia, CKD, anemia, cardiac arrest, multiple admission for sepsis due to recurrent VAP now admitted on 11/21 with failure to thrive and septic shock due to VAP with carbapenem resistant acinetobacter baumannii and acute on chronic hypoxic respiratory failure with high fio2 requirements. Patient remains on 100% fio2, peep of 5. satting 96%. Patient remains on 100% FIO2 w/SPO2 mid 90s. Patient requiring additional PRN sedation for vent synchrony. Copious, thick secretions noted in oropharynx. Patient remains too unstable for CTA at this time. Multiple GOCs have taken place with the patient's /hcp and patient remains FULL CODE, despite extremely poor prognosis. Drainage (possible tube feed/color and consistency) noted at PEG site would recommend holding tube feed until assessed by GI, already following. Will transition from PO Ativan ATC to IVP versed for vent synchrony in setting of possible PEG malfunction/dislodged.     Acute on chronic hypoxic respiratory failure  Septic shock, resolved  Failure to thrive/ severe protein malnourishment  VAP    NEURO: Currently off gtt sedation, will transition from PO ATC Ativan to IVP Versed for vent synchrony in setting of possible PEG malfunction/dislodged, if remains asynchronous would recommend low dose Precedex gtt as tolerated  CVS: Remains off pressors, BP stable MAP>75, continue on midodrine 5 mg TID  PULM: Remains on 100% fio2, unable to wean, SPO2 remains low to mid 90s, hypoxemia due to multifocal infiltrates, CTA to r/o PE in setting of TTE remarkable for new pulmonary HTN, patient remains too unstable for CTA, US B/L venous duplex negative for DVT, patient is not a candidate for AC 2/2 anemia and prior bleeds per Cardiology   GI: HOLD TF until PEG examined by GI, daily PPI IVP  RENAL: Trend bmp, trend lytes, replete as needed, strict I/Os, K+ slightly elevated today  ID: Abx completed 12/2, continue to monitor off abx, trend wbc/fever curve, CVC needs to be changed/switched to peripheral access or midline placement  ENDO: ISS protocol, Lantus in AM, glycemic goal BG<180, A1c 10/22 8.5   HEME: DVT PPx w/UFH

## 2022-12-06 NOTE — PROGRESS NOTE ADULT - ASSESSMENT
77F with dementia, COPD with chronic respiratory failure s/p trach, cardiac arrest, CHH, quadriplegia, CVA, CKD, anemia, PEG tube, and multiple hospital admissions for respiratory distress presents to the ED BIBEMS from [facility] for diaphoresis and fever.       poor prognosis relayed to   he wishes to cont full code measures      GOC   pt is full code   is full code  assist with needs -   oral hygiene  skin care  recurrent admissions  long term resident of SNF  vent dep  anoxic brain injury - dementia - vegetative state

## 2022-12-06 NOTE — PROGRESS NOTE ADULT - ASSESSMENT
REVIEW OF SYMPTOMS      Able to give (reliable) ROS  NO     PHYSICAL EXAM    HEENT Unremarkable  atraumatic   RESP Fair air entry EXP prolonged    Harsh breath sound Resp distres mild   CARDIAC S1 S2 No S3     NO JVD    ABDOMEN SOFT BS PRESENT NOT DISTENDED No hepatosplenomegaly   PEDAL EDEMA present No calf tenderness  NO rash       GENERAL DATA .   GOC.  full code      ALLGY.     codeine                        WT. 11/21/2022 57  BMI.    11/21/2022 21                          ICU STAY.   admitted ICU 11/21/2022 for shock requiring vasopressors   COVID.  Scv2 11/21/2022 scv2 (-)    PROCEDURE.  11/21/2022 [facility] central line    BEST PRACTICE ISSUES.    HOB ELEVATN. Yes  DVT PPLX.  11/21/2022 hpsc    SQUIRES PPLX.    11/21/2022 protonix 40   INFN PPLX.    11/21/2022 chlorhexidine .12% bid (mrsa)   11/21/2022 chlorhexidine 2% (c li)   SP SW EM.         DIET.  11/21/2022 glucerna 1.5 1000 gt               VS/ PO/IO/ VENT/ DRIPS.  12/6/2022 99f 100 160/80   12/6/2022 ac 18/350/5/100    CURRENT ADMISSION  Pt sent back from [facility] 11/21/2022 with fever abn labs Found to be in shock Norepi started 11/21/2022   Pulm crit care consulted      RECENT ADMISSIONS.  11/12-11/16/2022 11/4-11/10/2022  11/5 stenotrophomonas  uc 11/4 vr enterococc 50-99 candida   11/4/2022 levaquin 750 dced 11/7 doxy  11/8 bactrim 250.3  10/18-11/2/2022      CURRENT PROBLEMS.  Vent dependent   .. Trach poa 11/21/2022  COPD  Pleural effsn   RO VTE  .. 11/26 v duplx (-)   RIJ 11/21/2022   Pulmonary hypertension   .. 11/30/2022 echo n lvsf dilated rv n rvs sf ph 91        INFECTN   .. Decub ulcers   .. VAP 11/21/2022  ...... trach 11/21 mod acinetobacter carbapenem resist   ...... sp Bactrim tid  11/21-11/25/2022  ...... 11/25/2022 Unasyn 3.4 x 7d Dr Chairez      Shock   .. midodrine 11/21/2022     peg poa 11/21/2022  ANEMIA   .. Hb 11/25-12/4/2022 Hb 7.5- 8.6          ASSESSMENT/RECOMMENDATIONS .   RESP.  .. Gas exchange.  11/29/2022 ac 18/350/8/80 747/45/64   11/21/2022 4a On 100% vent 759/36/273   Monitor & target po 90-95%  .. VENT MANAGEMENT.   HOB elevation  Target Pplat 30 (-)  Target PO 90-95%  Target pH 730 (+)  Daily spontaneous breathing trials as applicable    Daily sedation vacation as applicable     SEDATION  12/4 fentanyl 50.4p dyssynchrony     .. Pleural effsn .   CXR 11/21/2022 r gr than l effsn  us chest 12/6/2022 bl effs sm to mod 484 cc r 646 l   Effusion has been tapped during prev admissions and is lp exudate If fever or worsening sepsis will plan thorac  12/5/2022 US chest done Effusions are small and symmpetrical  I think that as she is not febrile fluid is likely sec to chf and not sec to infection  Based on this i think that it is risk prohibitive perla try thoracentesis on patient on vent with high fio2 as a pneumothorax could be catastrophic      RO VTE.  11/26 v duplx (-)   11/26/2022 check cta ch   12/5/2022 attempts made to trasport pat several times last few d but pt becomes unstable when tried to move to ct per staff   12/6/2022 pt has remained too unstable to go for ct ch     .. Bronchospasm.  11/21/2022 albut hfa 2p bid     INFECTION.  .. SCV2 status.  Scv2 11/21/2022 scv2 (-)  .. VAP   w 12/5-12/6/2022 w 9.2 - 8.6   Pr 11/21-11/22/2022 pr 1.8 - 1.1  ua 11/21/2022 w 3-5   cxr 11/21 mod to large r effsn somewhat incr from 11/12 central infiltrates possibly congestive rij   trach 11/21 mod acinetobacter carbapenem resist   mrsa 11/21 (-)   12/4/2022 OFF ABIO    CARDIAC.  .. Shock.    11/22 midodr 15.3 -> 12/6/2022 midodrine 5.3   target map 65 (+)     .. CHF R HF   bnp 11/30/2022 [id number]  11/30/2022 echo n lvsf dilated rv n rvs sf ph 91        GI.  .. Nutrition.   11/21/2022 glucerna 1.5 1000 gt      HEMAT.  .. DVT pplx.   11/21/2022 hpsc      .. anemia.  Hb 12/5-12/6/2022 Hb 8 - 8.3   monitor target Hb 7 (+)       TIME SPENT   Over 39 minutes aggregate critical care time spent on encounter; activities included   direct patient care, counseling and/or coordinating care reviewing notes, lab data/ imaging , discussion with multidisciplinary team/ patient  /family and explaining in detail risks, benefits, alternatives  of the recommendations     CHAPINCITO GUIDRY 76 f NW S 11/21/2022   DR JOSEPH DU

## 2022-12-06 NOTE — PROGRESS NOTE ADULT - ASSESSMENT
The patient is a 77 year old female with a history of HTN, DM, CVA, dementia, chronic respiratory failure s/p trach, PEG, cardiac arrest, anemia, pleural effusions who presents with fever and respiratory distress.    Plan:  - Repeat echo with normal LV systolic function and worsened pulmonary pressures, now severe pulm HTN  - CXR with diffuse lung infiltrates  - Lower midodrine to 5 mg tid  - Low oncotic pressure leading to third spacing - diuretics likely to have minimal long term effect  - Severe pulm HTN - not a candidate for advanced pulmonary therapies  - Hypoxia requiring max vent settings - likely due to ARDS like picture and severe pulm HTN. Cannot exclude PE although patient is a poor candidate for long term anticoagulation due to prior bleeds and anemia.  - Pulm and ID follow-up  - Overall prognosis remains extremely poor. Comfort care would be most appropriate.

## 2022-12-06 NOTE — PROGRESS NOTE ADULT - SUBJECTIVE AND OBJECTIVE BOX
BREA BECKHAM     [facility] 03    Allergies    codeine (Hives)    Intolerances        PAST MEDICAL & SURGICAL HISTORY:  Dementia of frontal lobe type      Aphasic stroke      Diabetes mellitus      Respiratory failure      Hypertension      GERD (gastroesophageal reflux disease)      Constipation      Respiratory failure      CVA (cerebral vascular accident)      HTN (hypertension)      DM (diabetes mellitus)      Advanced dementia      COVID-19 virus detected      Quadriplegia      Pneumonia      Type II diabetes mellitus      Hx of appendectomy      Gastrostomy in place      Tracheostomy in place      Tracheostomy tube present      Feeding by G-tube          FAMILY HISTORY:  No pertinent family history in first degree relatives        Home Medications:  Admelog 100 units/mL injectable solution: injectable every 6 hours SS (21 Nov 2022 05:08)  albuterol 90 mcg/inh inhalation aerosol with adapter: 2  inhaled every 6 hours (21 Nov 2022 04:24)  Bacid (LAC) oral tablet: 2 tab(s) by gastrostomy tube once a day (21 Nov 2022 04:24)  bacitracin 500 units/g topical ointment: Apply topically to affected area once a day to knees (21 Nov 2022 04:24)  chlorhexidine 0.12% mucous membrane liquid: 15 milliliter(s) mucous membrane 2 times a day (21 Nov 2022 04:24)  Dakins Full Strength 0.5% topical solution: Apply topically to affected area 2 times a day then apply santyl and calcium alginate (21 Nov 2022 04:24)  Eucerin topical cream: Apply topically to affected area once a day bilateral feet (21 Nov 2022 04:24)  ferrous sulfate 325 mg (65 mg elemental iron) oral tablet: 1 tab(s) by gastrostomy tube once a day (21 Nov 2022 04:24)  insulin glargine 100 units/mL subcutaneous solution: 8 unit(s) subcutaneous once a day (in the morning) (21 Nov 2022 04:24)  ipratropium-albuterol 0.5 mg-2.5 mg/3 mL inhalation solution: 3 milliliter(s) inhaled every 6 hours (21 Nov 2022 04:24)  LORazepam 1 mg oral tablet: 1 tab(s) by gastrostomy tube every 4 hours (21 Nov 2022 04:24)  midodrine 10 mg oral tablet: 1 tab(s) by gastrostomy tube every 8 hours (21 Nov 2022 04:24)  MiraLax oral powder for reconstitution: orally once a day (at bedtime) (21 Nov 2022 05:08)  mulivitamin: by gastrostomy tube once a day (21 Nov 2022 04:24)  nystatin 100,000 units/g topical powder: 1 application topically 3 times a day (21 Nov 2022 04:24)  omeprazole 40 mg oral delayed release capsule: 1 cap(s) by gastrostomy tube 2 times a day (21 Nov 2022 04:24)  polyethylene glycol 3350 oral powder for reconstitution: 17 gram(s) by gastrostomy tube every 12 hours (21 Nov 2022 04:24)  senna 8.6 mg oral tablet: 3 tab(s) by gastrostomy tube once a day (at bedtime) (21 Nov 2022 04:24)  simethicone 80 mg oral tablet, chewable: 1 tab(s) by gastrostomy tube every 6 hours (21 Nov 2022 04:24)  sucralfate 1 g/10 mL oral suspension: 10 milliliter(s) g-tube 4 times a day (before meals and at bedtime) (21 Nov 2022 04:24)  Tylenol 325 mg oral tablet: 2 tab(s) orally every 6 hours, As Needed prior to dressing change (21 Nov 2022 04:24)      MEDICATIONS  (STANDING):  albuterol    90 MICROgram(s) HFA Inhaler 2 Puff(s) Inhalation two times a day  chlorhexidine 0.12% Liquid 15 milliLiter(s) Oral Mucosa every 12 hours  chlorhexidine 2% Cloths 1 Application(s) Topical daily  dextrose 5%. 1000 milliLiter(s) (50 mL/Hr) IV Continuous <Continuous>  dextrose 5%. 1000 milliLiter(s) (100 mL/Hr) IV Continuous <Continuous>  dextrose 50% Injectable 25 Gram(s) IV Push once  dextrose 50% Injectable 12.5 Gram(s) IV Push once  dextrose 50% Injectable 25 Gram(s) IV Push once  glucagon  Injectable 1 milliGRAM(s) IntraMuscular once  heparin   Injectable 5000 Unit(s) SubCutaneous every 12 hours  insulin glargine Injectable (LANTUS) 8 Unit(s) SubCutaneous every morning  insulin lispro (ADMELOG) corrective regimen sliding scale   SubCutaneous every 6 hours  LORazepam     Tablet 1 milliGRAM(s) Oral every 4 hours  midodrine 5 milliGRAM(s) Oral every 8 hours  pantoprazole  Injectable 40 milliGRAM(s) IV Push daily  polyethylene glycol 3350 17 Gram(s) Oral every 12 hours  povidone iodine 10% Solution 1 Application(s) Topical daily  senna 2 Tablet(s) Oral at bedtime    MEDICATIONS  (PRN):  dextrose Oral Gel 15 Gram(s) Oral once PRN Blood Glucose LESS THAN 70 milliGRAM(s)/deciliter  fentaNYL    Injectable 50 MICROGram(s) IV Push every 6 hours PRN Tachypnea and Vent dyssynchrony  LORazepam   Injectable 2 milliGRAM(s) IV Push every 6 hours PRN Agitation  sodium chloride 0.9% lock flush 10 milliLiter(s) IV Push every 1 hour PRN Pre/post blood products, medications, blood draw, and to maintain line patency      Diet, NPO with Tube Feed:   Tube Feeding Modality: Gastrostomy  Glucerna 1.5 Horacio  Total Volume for 24 Hours (mL): 1000  Continuous  Starting Tube Feed Rate mL per Hour: 10  Increase Tube Feed Rate by (mL): 10     Every 10 hours  Until Goal Tube Feed Rate (mL per Hour): 50  Tube Feed Duration (in Hours): 20  Tube Feed Start Time: 17:00  Free Water Flush  Pump   Rate (mL per Hour): 25   Frequency: Every Hour    Duration (Hours): 24 (11-21-22 @ 05:10) [Active]          Vital Signs Last 24 Hrs  T(C): 36.1 (06 Dec 2022 07:58), Max: 37.3 (05 Dec 2022 13:55)  T(F): 97 (06 Dec 2022 07:58), Max: 99.1 (05 Dec 2022 13:55)  HR: 95 (06 Dec 2022 09:00) (78 - 96)  BP: 162/76 (06 Dec 2022 09:00) (122/64 - 165/75)  BP(mean): 101 (06 Dec 2022 09:00) (74 - 101)  RR: 39 (06 Dec 2022 09:00) (18 - 40)  SpO2: 98% (06 Dec 2022 09:00) (91% - 100%)    Parameters below as of 06 Dec 2022 08:30  Patient On (Oxygen Delivery Method): ventilator,80          12-05-22 @ 07:01  -  12-06-22 @ 07:00  --------------------------------------------------------  IN: 1190 mL / OUT: 0 mL / NET: 1190 mL    12-06-22 @ 07:01  -  12-06-22 @ 10:05  --------------------------------------------------------  IN: 225 mL / OUT: 0 mL / NET: 225 mL        Mode: AC/ CMV (Assist Control/ Continuous Mandatory Ventilation), RR (machine): 18, TV (machine): 350, FiO2: 100, PEEP: 5, ITime: 1, MAP: 23, PIP: 47      LABS:                        8.3    8.68  )-----------( 212      ( 06 Dec 2022 06:36 )             28.1     12-06    137  |  101  |  22  ----------------------------<  157<H>  5.0   |  30  |  0.88    Ca    8.6      06 Dec 2022 06:36  Phos  4.3     12-06  Mg     2.0     12-06    TPro  7.5  /  Alb  1.6<L>  /  TBili  0.5  /  DBili  x   /  AST  24  /  ALT  10  /  AlkPhos  206<H>  12-06              WBC:  WBC Count: 8.68 K/uL (12-06 @ 06:36)  WBC Count: 9.21 K/uL (12-05 @ 06:20)  WBC Count: 14.63 K/uL (12-04 @ 05:45)  WBC Count: 8.87 K/uL (12-03 @ 06:27)      MICROBIOLOGY:  RECENT CULTURES:                  Sodium:  Sodium, Serum: 137 mmol/L (12-06 @ 06:36)  Sodium, Serum: 139 mmol/L (12-05 @ 06:20)  Sodium, Serum: 139 mmol/L (12-04 @ 05:45)  Sodium, Serum: 141 mmol/L (12-03 @ 06:27)      0.88 mg/dL 12-06 @ 06:36  0.96 mg/dL 12-05 @ 06:20  1.03 mg/dL 12-04 @ 05:45  0.94 mg/dL 12-03 @ 06:27      Hemoglobin:  Hemoglobin: 8.3 g/dL (12-06 @ 06:36)  Hemoglobin: 8.0 g/dL (12-05 @ 06:20)  Hemoglobin: 8.6 g/dL (12-04 @ 05:45)  Hemoglobin: 8.3 g/dL (12-03 @ 06:27)      Platelets: Platelet Count - Automated: 212 K/uL (12-06 @ 06:36)  Platelet Count - Automated: 166 K/uL (12-05 @ 06:20)  Platelet Count - Automated: 184 K/uL (12-04 @ 05:45)  Platelet Count - Automated: 185 K/uL (12-03 @ 06:27)      LIVER FUNCTIONS - ( 06 Dec 2022 06:36 )  Alb: 1.6 g/dL / Pro: 7.5 g/dL / ALK PHOS: 206 U/L / ALT: 10 U/L DA / AST: 24 U/L / GGT: x                 RADIOLOGY & ADDITIONAL STUDIES:      MICROBIOLOGY:  RECENT CULTURES:

## 2022-12-06 NOTE — PROGRESS NOTE ADULT - SUBJECTIVE AND OBJECTIVE BOX
INTERVAL HPI/OVERNIGHT EVENTS:  events noted  MEDICATIONS  (STANDING):  albuterol    90 MICROgram(s) HFA Inhaler 2 Puff(s) Inhalation two times a day  chlorhexidine 0.12% Liquid 15 milliLiter(s) Oral Mucosa every 12 hours  chlorhexidine 2% Cloths 1 Application(s) Topical daily  dextrose 5%. 1000 milliLiter(s) (50 mL/Hr) IV Continuous <Continuous>  dextrose 5%. 1000 milliLiter(s) (100 mL/Hr) IV Continuous <Continuous>  dextrose 50% Injectable 25 Gram(s) IV Push once  dextrose 50% Injectable 12.5 Gram(s) IV Push once  dextrose 50% Injectable 25 Gram(s) IV Push once  glucagon  Injectable 1 milliGRAM(s) IntraMuscular once  heparin   Injectable 5000 Unit(s) SubCutaneous every 12 hours  insulin glargine Injectable (LANTUS) 8 Unit(s) SubCutaneous every morning  insulin lispro (ADMELOG) corrective regimen sliding scale   SubCutaneous every 6 hours  LORazepam     Tablet 1 milliGRAM(s) Oral every 4 hours  midodrine 5 milliGRAM(s) Oral every 8 hours  pantoprazole  Injectable 40 milliGRAM(s) IV Push daily  polyethylene glycol 3350 17 Gram(s) Oral every 12 hours  povidone iodine 10% Solution 1 Application(s) Topical daily  senna 2 Tablet(s) Oral at bedtime    MEDICATIONS  (PRN):  dextrose Oral Gel 15 Gram(s) Oral once PRN Blood Glucose LESS THAN 70 milliGRAM(s)/deciliter  fentaNYL    Injectable 50 MICROGram(s) IV Push every 6 hours PRN Tachypnea and Vent dyssynchrony  LORazepam   Injectable 2 milliGRAM(s) IV Push every 6 hours PRN Agitation  sodium chloride 0.9% lock flush 10 milliLiter(s) IV Push every 1 hour PRN Pre/post blood products, medications, blood draw, and to maintain line patency      Allergies    codeine (Hives)    Intolerances        Review of Systems:    General:  No wt loss, fevers, chills, night sweats,fatigue,   Eyes:  Good vision, no reported pain  ENT:  No sore throat, pain, runny nose, dysphagia  CV:  No pain, palpitatioins, hypo/hypertension  Resp:  No dyspnea, cough, tachypnea, wheezing  GI:  No pain, No nausea, No vomiting, No diarrhea, No constipatiion, No weight loss, No fever, No pruritis, No rectal bleeding, No tarry stools, No dysphagia,  :  No pain, bleeding, incontinence, nocturia  Muscle:  No pain, weakness  Neuro:  No weakness, tingling, memory problems  Psych:  No fatigue, insomnia, mood problems, depression  Endocrine:  No polyuria, polydypsia, cold/heat intolerance  Heme:  No petechiae, ecchymosis, easy bruisability  Skin:  No rash, tattoos, scars, edema      Vital Signs Last 24 Hrs  T(C): 36.8 (06 Dec 2022 12:05), Max: 37.1 (05 Dec 2022 15:42)  T(F): 98.3 (06 Dec 2022 12:05), Max: 98.8 (05 Dec 2022 15:42)  HR: 101 (06 Dec 2022 14:00) (81 - 101)  BP: 155/86 (06 Dec 2022 14:00) (122/64 - 165/75)  BP(mean): 107 (06 Dec 2022 14:00) (83 - 107)  RR: 28 (06 Dec 2022 14:00) (18 - 42)  SpO2: 96% (06 Dec 2022 14:00) (91% - 100%)    Parameters below as of 06 Dec 2022 12:00  Patient On (Oxygen Delivery Method): ventilator    O2 Concentration (%): 100    PHYSICAL EXAM:    Constitutional: NAD, well-developed  HEENT: EOMI, throat clear  Neck: No LAD, supple  Respiratory: CTA and P  Cardiovascular: S1 and S2, RRR, no M  Gastrointestinal: BS+, soft, NT/ND, neg HSM,  Extremities: No peripheral edema, neg clubing, cyanosis  Vascular: 2+ peripheral pulses  Neurological: A/O x 3, no focal deficits  Psychiatric: Normal mood, normal affect  Skin: No rashes      LABS:                        8.3    8.68  )-----------( 212      ( 06 Dec 2022 06:36 )             28.1     12-06    137  |  101  |  22  ----------------------------<  157<H>  5.0   |  30  |  0.88    Ca    8.6      06 Dec 2022 06:36  Phos  4.3     12-06  Mg     2.0     12-06    TPro  7.5  /  Alb  1.6<L>  /  TBili  0.5  /  DBili  x   /  AST  24  /  ALT  10  /  AlkPhos  206<H>  12-06          RADIOLOGY & ADDITIONAL TESTS:

## 2022-12-06 NOTE — PROGRESS NOTE ADULT - ASSESSMENT
77F with dementia, COPD with chronic respiratory failure s/p trach, cardiac arrest, CHH, quadriplegia, CVA, CKD, anemia, PEG tube, and multiple hospital admissions for respiratory distress presents to the ED BIBEMS from [facility] for diaphoresis and fever.     Readmitted with sepsis with shock  2/2 recurent VAP.      Sputum cx 11/21 with Acinetobacter baumannii/nosocom group (Carbapenem Resistant) resistant to Bactrim  Off pressors   Remains on 100% fi02- unable to be wean down   WBC 14 --> 9, leukocytosis resolved  Sp Unasyn x 7 days ended 12/2/22    Plan:   Monitor off Abx  Trend temps and cbc  Asp precautions  Aggressive pulm toileting  Isolation per infection control policy  Vent management per ICU  Very poor prognosis    Continued GOC discussion with  per palliative team    Infectious Diseases will continue to follow. Please call with any questions.   Andressa Brantley M.D.  [facility] Division of Infectious Diseases [phone]

## 2022-12-06 NOTE — PROGRESS NOTE ADULT - SUBJECTIVE AND OBJECTIVE BOX
[facility], Division of Infectious Diseases  MOIZ Lora Y. Patel, S. Shah, G. [facility]  [phone]    Name: BREA BECKHAM  Age: 77y  Gender: Female  MRN: [id number]    Interval History:  Patient seen and examined at bedside in [facility]  Notes reviewed    Antibiotics:      Medications:  albuterol    90 MICROgram(s) HFA Inhaler 2 Puff(s) Inhalation two times a day  chlorhexidine 0.12% Liquid 15 milliLiter(s) Oral Mucosa every 12 hours  chlorhexidine 2% Cloths 1 Application(s) Topical daily  dextrose 5%. 1000 milliLiter(s) IV Continuous <Continuous>  dextrose 5%. 1000 milliLiter(s) IV Continuous <Continuous>  dextrose 50% Injectable 25 Gram(s) IV Push once  dextrose 50% Injectable 12.5 Gram(s) IV Push once  dextrose 50% Injectable 25 Gram(s) IV Push once  dextrose Oral Gel 15 Gram(s) Oral once PRN  fentaNYL    Injectable 50 MICROGram(s) IV Push every 6 hours PRN  glucagon  Injectable 1 milliGRAM(s) IntraMuscular once  heparin   Injectable 5000 Unit(s) SubCutaneous every 12 hours  insulin glargine Injectable (LANTUS) 8 Unit(s) SubCutaneous every morning  insulin lispro (ADMELOG) corrective regimen sliding scale   SubCutaneous every 6 hours  LORazepam     Tablet 1 milliGRAM(s) Oral every 4 hours  LORazepam   Injectable 2 milliGRAM(s) IV Push every 6 hours PRN  midodrine 5 milliGRAM(s) Oral every 8 hours  pantoprazole  Injectable 40 milliGRAM(s) IV Push daily  polyethylene glycol 3350 17 Gram(s) Oral every 12 hours  povidone iodine 10% Solution 1 Application(s) Topical daily  senna 2 Tablet(s) Oral at bedtime  sodium chloride 0.9% lock flush 10 milliLiter(s) IV Push every 1 hour PRN      Review of Systems:  unable to obtain    Allergies: codeine (Hives)    For details regarding the patient's past medical history, social history, family history, and other miscellaneous elements, please refer the initial infectious diseases consultation and/or the admitting history and physical examination for this admission.    Objective:  Vitals:   T(C): 36.8 (12-06-22 @ 12:05), Max: 37.3 (12-05-22 @ 13:55)  HR: 98 (12-06-22 @ 11:00) (78 - 98)  BP: 154/74 (12-06-22 @ 11:00) (122/64 - 165/75)  RR: 42 (12-06-22 @ 11:00) (18 - 42)  SpO2: 100% (12-06-22 @ 11:00) (91% - 100%)    Physical Examination:  General: vented pt  HEENT: NC/AT  Neck: trach  Cardio: S1, S2 heard, RRR, no murmurs  Resp: MV breath sounds  Abd: distended  Ext: no edema or cyanosis  Skin: warm, dry, no visible rash    Laboratory Studies:  CBC:                       8.3    8.68  )-----------( 212      ( 06 Dec 2022 06:36 )             28.1     CMP: 12-06    137  |  101  |  22  ----------------------------<  157<H>  5.0   |  30  |  0.88    Ca    8.6      06 Dec 2022 06:36  Phos  4.3     12-06  Mg     2.0     12-06    TPro  7.5  /  Alb  1.6<L>  /  TBili  0.5  /  DBili  x   /  AST  24  /  ALT  10  /  AlkPhos  206<H>  12-06    LIVER FUNCTIONS - ( 06 Dec 2022 06:36 )  Alb: 1.6 g/dL / Pro: 7.5 g/dL / ALK PHOS: 206 U/L / ALT: 10 U/L DA / AST: 24 U/L / GGT: x               Microbiology: reviewed        Radiology: reviewed

## 2022-12-06 NOTE — PROGRESS NOTE ADULT - ASSESSMENT
77F with dementia, COPD with chronic respiratory failure s/p trach, cardiac arrest, CHH, quadriplegia, CVA, CKD, anemia, PEG tube, and multiple hospital admissions for respiratory distress presents to the ED BIBEMS from [facility] for diaphoresis and fever.         COPD with chronic respiratory failure s/p trach  unable to wean off 100% FiO2  poor prognosis  GOC discussion needs to be revisited  pulmonary recs appreciated  CTA was ordered but unable to perform, pt cannot tolerated laying flat  U/S results reviewed with Dr. Sandoval, small lto moderate effusion, not enough to do thoracentesis for benefit  we won't be able to have the thoracentesis done, high risk per Dr. Sandoval and no benefit.   Advised  that pt is unable to get weaned off back to her baseline and we are out options at this time.  He wants to continue her to be full code.     ANemia  multifacotrial/ AICD   s/p 1 unit prbc since admission   H and H stable   continue supportive care    Sepsis 2/2 VAP - meets sepsis based on fever + tachycardia + source of infection.  Lactate 1.9  peristent hypotension  Completed unasyn 12/1/22  - cont with vent settings to maintain SaO2 >92%  - cont with midodrine 15mg TID, pressors PRN  - on ativan for agitation  - MRSA neg, trach culture +acinetobacter   wean as tolerating   Trend temps and cbc    Hyperkalemia  resolved  s/p lokelma     Hyponatremia  - Na of 130 today, likely hypovolemic  - urine lytes ordered  - continue to trend    Leukocytosis  - WBC WNL  - no fevers  comleted unasyn  - continue to trend     DM2 on insulin  - cont with lantus and insulin coverage scale with FS q6h while on TF    COPD   - cont with neb treatments    Anemia of chronic disease   - GI eval - conservative management   - cont with ferrous sulfate  - trend CBC    Prognosis grave, patient remains critically ill and is at high risk for abrupt decompensation and death

## 2022-12-07 LAB
ANION GAP SERPL CALC-SCNC: 4 MMOL/L — LOW (ref 5–17)
BUN SERPL-MCNC: 20 MG/DL — SIGNIFICANT CHANGE UP (ref 7–23)
CALCIUM SERPL-MCNC: 8.7 MG/DL — SIGNIFICANT CHANGE UP (ref 8.4–10.5)
CHLORIDE SERPL-SCNC: 102 MMOL/L — SIGNIFICANT CHANGE UP (ref 96–108)
CO2 SERPL-SCNC: 33 MMOL/L — HIGH (ref 22–31)
CREAT SERPL-MCNC: 0.76 MG/DL — SIGNIFICANT CHANGE UP (ref 0.5–1.3)
EGFR: 80 ML/MIN/1.73M2 — SIGNIFICANT CHANGE UP
GLUCOSE SERPL-MCNC: 122 MG/DL — HIGH (ref 70–99)
HCT VFR BLD CALC: 27.4 % — LOW (ref 34.5–45)
HGB BLD-MCNC: 7.9 G/DL — LOW (ref 11.5–15.5)
MAGNESIUM SERPL-MCNC: 2.3 MG/DL — SIGNIFICANT CHANGE UP (ref 1.6–2.6)
MCHC RBC-ENTMCNC: 27.1 PG — SIGNIFICANT CHANGE UP (ref 27–34)
MCHC RBC-ENTMCNC: 28.8 GM/DL — LOW (ref 32–36)
MCV RBC AUTO: 93.8 FL — SIGNIFICANT CHANGE UP (ref 80–100)
NRBC # BLD: 0 /100 WBCS — SIGNIFICANT CHANGE UP (ref 0–0)
PHOSPHATE SERPL-MCNC: 4.3 MG/DL — SIGNIFICANT CHANGE UP (ref 2.5–4.5)
PLATELET # BLD AUTO: 214 K/UL — SIGNIFICANT CHANGE UP (ref 150–400)
POTASSIUM SERPL-MCNC: 4.9 MMOL/L — SIGNIFICANT CHANGE UP (ref 3.5–5.3)
POTASSIUM SERPL-SCNC: 4.9 MMOL/L — SIGNIFICANT CHANGE UP (ref 3.5–5.3)
RBC # BLD: 2.92 M/UL — LOW (ref 3.8–5.2)
RBC # FLD: 15.6 % — HIGH (ref 10.3–14.5)
SODIUM SERPL-SCNC: 139 MMOL/L — SIGNIFICANT CHANGE UP (ref 135–145)
WBC # BLD: 7.1 K/UL — SIGNIFICANT CHANGE UP (ref 3.8–10.5)
WBC # FLD AUTO: 7.1 K/UL — SIGNIFICANT CHANGE UP (ref 3.8–10.5)

## 2022-12-07 PROCEDURE — 76937 US GUIDE VASCULAR ACCESS: CPT | Mod: 26,59

## 2022-12-07 PROCEDURE — 99233 SBSQ HOSP IP/OBS HIGH 50: CPT

## 2022-12-07 PROCEDURE — 36573 INSJ PICC RS&I 5 YR+: CPT

## 2022-12-07 RX ORDER — SODIUM CHLORIDE 9 MG/ML
1000 INJECTION, SOLUTION INTRAVENOUS
Refills: 0 | Status: DISCONTINUED | OUTPATIENT
Start: 2022-12-07 | End: 2022-12-08

## 2022-12-07 RX ADMIN — MIDAZOLAM HYDROCHLORIDE 2 MILLIGRAM(S): 1 INJECTION, SOLUTION INTRAMUSCULAR; INTRAVENOUS at 09:37

## 2022-12-07 RX ADMIN — FENTANYL CITRATE 50 MICROGRAM(S): 50 INJECTION INTRAVENOUS at 17:07

## 2022-12-07 RX ADMIN — HEPARIN SODIUM 5000 UNIT(S): 5000 INJECTION INTRAVENOUS; SUBCUTANEOUS at 18:14

## 2022-12-07 RX ADMIN — MIDAZOLAM HYDROCHLORIDE 2 MILLIGRAM(S): 1 INJECTION, SOLUTION INTRAMUSCULAR; INTRAVENOUS at 16:24

## 2022-12-07 RX ADMIN — HEPARIN SODIUM 5000 UNIT(S): 5000 INJECTION INTRAVENOUS; SUBCUTANEOUS at 05:38

## 2022-12-07 RX ADMIN — SODIUM CHLORIDE 50 MILLILITER(S): 9 INJECTION, SOLUTION INTRAVENOUS at 18:14

## 2022-12-07 RX ADMIN — CHLORHEXIDINE GLUCONATE 1 APPLICATION(S): 213 SOLUTION TOPICAL at 05:38

## 2022-12-07 RX ADMIN — MIDAZOLAM HYDROCHLORIDE 2 MILLIGRAM(S): 1 INJECTION, SOLUTION INTRAMUSCULAR; INTRAVENOUS at 22:50

## 2022-12-07 RX ADMIN — Medication 1 APPLICATION(S): at 12:26

## 2022-12-07 RX ADMIN — CHLORHEXIDINE GLUCONATE 15 MILLILITER(S): 213 SOLUTION TOPICAL at 18:14

## 2022-12-07 RX ADMIN — INSULIN GLARGINE 8 UNIT(S): 100 INJECTION, SOLUTION SUBCUTANEOUS at 07:36

## 2022-12-07 RX ADMIN — MIDAZOLAM HYDROCHLORIDE 2 MILLIGRAM(S): 1 INJECTION, SOLUTION INTRAMUSCULAR; INTRAVENOUS at 05:38

## 2022-12-07 RX ADMIN — FENTANYL CITRATE 50 MICROGRAM(S): 50 INJECTION INTRAVENOUS at 17:30

## 2022-12-07 RX ADMIN — ALBUTEROL 2 PUFF(S): 90 AEROSOL, METERED ORAL at 06:36

## 2022-12-07 RX ADMIN — CHLORHEXIDINE GLUCONATE 15 MILLILITER(S): 213 SOLUTION TOPICAL at 05:39

## 2022-12-07 RX ADMIN — ALBUTEROL 2 PUFF(S): 90 AEROSOL, METERED ORAL at 21:04

## 2022-12-07 RX ADMIN — PANTOPRAZOLE SODIUM 40 MILLIGRAM(S): 20 TABLET, DELAYED RELEASE ORAL at 12:26

## 2022-12-07 NOTE — PROGRESS NOTE ADULT - ASSESSMENT
REVIEW OF SYMPTOMS      Able to give (reliable) ROS  NO     PHYSICAL EXAM    HEENT Unremarkable  atraumatic   RESP Fair air entry EXP prolonged    Harsh breath sound Resp distres mild   CARDIAC S1 S2 No S3     NO JVD    ABDOMEN SOFT BS PRESENT NOT DISTENDED No hepatosplenomegaly   PEDAL EDEMA present No calf tenderness  NO rash       GENERAL DATA .   GOC.  full code      ALLGY.     codeine                        WT. 11/21/2022 57  BMI.    11/21/2022 21                          ICU STAY.   admitted ICU 11/21/2022 for shock requiring vasopressors   COVID.  Scv2 11/21/2022 scv2 (-)    PROCEDURE.  11/21/2022 rij central line  12/7/2022 midline     BEST PRACTICE ISSUES.    HOB ELEVATN. Yes  DVT PPLX.  11/21/2022 hpsc    SQUIRES PPLX.    11/21/2022 protonix 40   INFN PPLX.    11/21/2022 chlorhexidine .12% bid (mrsa)   11/21/2022 chlorhexidine 2% (c li)   SP SW EM.         DIET.  11/21/2022 glucerna 1.5 1000 gt               VS/ PO/IO/ VENT/ DRIPS.  12/7/2022 afeb 92 130/70   12/7/2022 ac 18/350/5/100    CURRENT ADMISSION  Pt sent back from [facility] 11/21/2022 with fever abn labs Found to be in shock Norepi started 11/21/2022   Pulm crit care consulted      RECENT ADMISSIONS.  11/12-11/16/2022 11/4-11/10/2022  11/5 stenotrophomonas  uc 11/4 vr enterococc 50-99 candida   11/4/2022 levaquin 750 dced 11/7 doxy  11/8 bactrim 250.3  10/18-11/2/2022      CURRENT PROBLEMS.  Vent dependent   .. Trach poa 11/21/2022  COPD  Pleural effsn   RO VTE  .. 11/26 v duplx (-)   RIJ 11/21/2022   Pulmonary hypertension   .. 11/30/2022 echo n lvsf dilated rv n rvs sf ph 91        INFECTN   .. Decub ulcers   .. VAP 11/21/2022  ...... trach 11/21 mod acinetobacter carbapenem resist   ...... sp Bactrim tid  11/21-11/25/2022  ...... 11/25/2022 Unasyn 3.4 x 7d Dr Chairez      Shock   .. midodrine 11/21/2022     peg poa 11/21/2022  ANEMIA   .. Hb 11/25-12/4/2022 Hb 7.5- 8.6          ASSESSMENT/RECOMMENDATIONS .   RESP.  .. Gas exchange.  11/29/2022 ac 18/350/8/80 747/45/64   11/21/2022 4a On 100% vent 759/36/273   Monitor & target po 90-95%  .. VENT MANAGEMENT.   HOB elevation  Target Pplat 30 (-)  Target PO 90-95%  Target pH 730 (+)  Daily spontaneous breathing trials as applicable    Daily sedation vacation as applicable     SEDATION  12/4 fentanyl 50.4p dyssynchrony     .. Pleural effsn .   CXR 11/21/2022 r gr than l effsn  us chest 12/6/2022 bl effs sm to mod 484 cc r 646 l   Effusion has been tapped during prev admissions and is lp exudate If fever or worsening sepsis will plan thorac  12/5/2022 US chest done Effusions are small and symmpetrical  I think that as she is not febrile fluid is likely sec to chf and not sec to infection  Based on this i think that it is risk prohibitive perla try thoracentesis on patient on vent with high fio2 as a pneumothorax could be catastrophic      RO VTE.  11/26 v duplx (-)   11/26/2022 check cta ch   12/5/2022 attempts made to trasport pat several times last few d but pt becomes unstable when tried to move to ct per staff   12/6/2022 pt has remained too unstable to go for ct ch     .. Bronchospasm.  11/21/2022 albut hfa 2p bid     INFECTION.  .. SCV2 status.  Scv2 11/21/2022 scv2 (-)  .. VAP   w 12/5-12/6-12/7/2022 w 9.2 - 8.6 - 7.1  Pr 11/21-11/22/2022 pr 1.8 - 1.1  ua 11/21/2022 w 3-5   cxr 11/21 mod to large r effsn somewhat incr from 11/12 central infiltrates possibly congestive rij   trach 11/21 mod acinetobacter carbapenem resist   mrsa 11/21 (-)   12/4/2022 OFF ABIO    CARDIAC.  .. Shock.    11/22 midodr 15.3 -> 12/6/2022 midodrine 5.3   target map 65 (+)     .. CHF R HF   bnp 11/30/2022 [id number]  11/30/2022 echo n lvsf dilated rv n rvs sf ph 91        GI.  .. Nutrition.   11/21/2022 glucerna 1.5 1000 gt      HEMAT.  .. DVT pplx.   11/21/2022 hpsc      .. anemia.  Hb 12/5-12/6-12/7/2022 Hb 8 - 8.3 - 7.9   monitor target Hb 7 (+)       TIME SPENT   Over 39 minutes aggregate critical care time spent on encounter; activities included   direct patient care, counseling and/or coordinating care reviewing notes, lab data/ imaging , discussion with multidisciplinary team/ patient  /family and explaining in detail risks, benefits, alternatives  of the recommendations     CHAPINCITO GUIDRY 78 f [facility] S 11/21/2022   DR JOSEPH DU

## 2022-12-07 NOTE — PROGRESS NOTE ADULT - ASSESSMENT
77F with dementia, COPD with chronic respiratory failure s/p trach, cardiac arrest, CHH, quadriplegia, CVA, CKD, anemia, PEG tube, and multiple hospital admissions for respiratory distress presents to the ED BIBEMS from [facility] for diaphoresis and fever.     Readmitted with sepsis with shock  2/2 recurent VAP.    Sputum cx 11/21 with Acinetobacter baumannii/nosocom group (Carbapenem Resistant) resistant to Bactrim  Off pressors   Remains on 100% fi02- unable to be wean down   WBC 14 --> 9, leukocytosis resolved  Sp Unasyn x 7 days ended 12/2/22    Plan:   Monitor off Abx  Trend temps and cbc  Asp precautions  Aggressive pulm toileting  Isolation per infection control policy  Vent management per ICU  --unable to wean off 100% FiO2  --unable to obtain CTA as pt cannot tolerate position   --US chest w/ b/l pleural effusions; not a candidate for thoracentesis     Very poor prognosis  Continued GOC discussion with  per palliative team    Infectious Diseases will continue to follow. Please call with any questions.   Andressa Brantley M.D.  [facility] Division of Infectious Diseases [phone]

## 2022-12-07 NOTE — PROGRESS NOTE ADULT - SUBJECTIVE AND OBJECTIVE BOX
Chief Complaint: Respiratory distress    Interval Events: No events overnight.    Review of Systems:  Unable to obtain    Physical Exam:  Vital Signs Last 24 Hrs  T(C): 36.9 (07 Dec 2022 08:04), Max: 37.4 (06 Dec 2022 16:10)  T(F): 98.4 (07 Dec 2022 08:04), Max: 99.4 (06 Dec 2022 16:10)  HR: 90 (07 Dec 2022 08:00) (85 - 108)  BP: 142/70 (07 Dec 2022 08:00) (97/67 - 172/84)  BP(mean): 89 (07 Dec 2022 08:00) (76 - 109)  RR: 42 (07 Dec 2022 08:00) (19 - 42)  SpO2: 92% (07 Dec 2022 08:00) (92% - 100%)  Parameters below as of 07 Dec 2022 06:45  Patient On (Oxygen Delivery Method): ventilator  General: Unresponsive  HEENT: MMM  Neck: No JVD, no carotid bruit  Lungs: CTAB  CV: RRR, nl S1/S2, no M/R/G  Abdomen: S/NT/ND, +BS  Extremities: 2+ LE edema, no cyanosis  Neuro: AAOx0  Skin: No rash    Labs:    12-07    139  |  102  |  20  ----------------------------<  122<H>  4.9   |  33<H>  |  0.76    Ca    8.7      07 Dec 2022 06:00  Phos  4.3     12-07  Mg     2.3     12-07    TPro  7.5  /  Alb  1.6<L>  /  TBili  0.5  /  DBili  x   /  AST  24  /  ALT  10  /  AlkPhos  206<H>  12-06                        7.9    7.10  )-----------( 214      ( 07 Dec 2022 06:00 )             27.4       ECG/Telemetry: Sinus rhythm

## 2022-12-07 NOTE — PROGRESS NOTE ADULT - SUBJECTIVE AND OBJECTIVE BOX
INTERVAL HPI/OVERNIGHT EVENTS:  No new overnight event.  No N/V/D.  Tolerating diet.   MEDICATIONS  (STANDING):  albuterol    90 MICROgram(s) HFA Inhaler 2 Puff(s) Inhalation two times a day  chlorhexidine 0.12% Liquid 15 milliLiter(s) Oral Mucosa every 12 hours  chlorhexidine 2% Cloths 1 Application(s) Topical daily  dextrose 5%. 1000 milliLiter(s) (50 mL/Hr) IV Continuous <Continuous>  dextrose 5%. 1000 milliLiter(s) (100 mL/Hr) IV Continuous <Continuous>  dextrose 5%. 1000 milliLiter(s) (50 mL/Hr) IV Continuous <Continuous>  dextrose 50% Injectable 25 Gram(s) IV Push once  dextrose 50% Injectable 12.5 Gram(s) IV Push once  dextrose 50% Injectable 25 Gram(s) IV Push once  glucagon  Injectable 1 milliGRAM(s) IntraMuscular once  heparin   Injectable 5000 Unit(s) SubCutaneous every 12 hours  insulin glargine Injectable (LANTUS) 8 Unit(s) SubCutaneous every morning  insulin lispro (ADMELOG) corrective regimen sliding scale   SubCutaneous every 6 hours  pantoprazole  Injectable 40 milliGRAM(s) IV Push daily  polyethylene glycol 3350 17 Gram(s) Oral every 12 hours  povidone iodine 10% Solution 1 Application(s) Topical daily  senna 2 Tablet(s) Oral at bedtime    MEDICATIONS  (PRN):  dextrose Oral Gel 15 Gram(s) Oral once PRN Blood Glucose LESS THAN 70 milliGRAM(s)/deciliter  fentaNYL    Injectable 50 MICROGram(s) IV Push every 6 hours PRN Tachypnea and Vent dyssynchrony  midazolam Injectable 2 milliGRAM(s) IV Push every 2 hours PRN Vent synchrony  sodium chloride 0.9% lock flush 10 milliLiter(s) IV Push every 1 hour PRN Pre/post blood products, medications, blood draw, and to maintain line patency      Allergies    codeine (Hives)    Intolerances        Review of Systems:    General:  No wt loss, fevers, chills, night sweats,fatigue,   Eyes:  Good vision, no reported pain  ENT:  No sore throat, pain, runny nose, dysphagia  CV:  No pain, palpitatioins, hypo/hypertension  Resp:  No dyspnea, cough, tachypnea, wheezing  GI:  No pain, No nausea, No vomiting, No diarrhea, No constipatiion, No weight loss, No fever, No pruritis, No rectal bleeding, No tarry stools, No dysphagia,  :  No pain, bleeding, incontinence, nocturia  Muscle:  No pain, weakness  Neuro:  No weakness, tingling, memory problems  Psych:  No fatigue, insomnia, mood problems, depression  Endocrine:  No polyuria, polydypsia, cold/heat intolerance  Heme:  No petechiae, ecchymosis, easy bruisability  Skin:  No rash, tattoos, scars, edema      Vital Signs Last 24 Hrs  T(C): 36.7 (07 Dec 2022 16:33), Max: 37 (07 Dec 2022 12:19)  T(F): 98.1 (07 Dec 2022 16:33), Max: 98.6 (07 Dec 2022 12:19)  HR: 94 (07 Dec 2022 16:47) (87 - 101)  BP: 136/70 (07 Dec 2022 16:00) (97/67 - 152/72)  BP(mean): 90 (07 Dec 2022 16:00) (76 - 98)  RR: 37 (07 Dec 2022 16:00) (21 - 42)  SpO2: 98% (07 Dec 2022 16:47) (92% - 100%)    Parameters below as of 07 Dec 2022 06:45  Patient On (Oxygen Delivery Method): ventilator        PHYSICAL EXAM:    Constitutional: NAD, well-developed  HEENT: EOMI, throat clear  Neck: No LAD, supple  Respiratory: CTA and P  Cardiovascular: S1 and S2, RRR, no M  Gastrointestinal: BS+, soft, NT/ND, neg HSM,  Extremities: No peripheral edema, neg clubing, cyanosis  Vascular: 2+ peripheral pulses  Neurological: A/O x 3, no focal deficits  Psychiatric: Normal mood, normal affect  Skin: No rashes      LABS:                        7.9    7.10  )-----------( 214      ( 07 Dec 2022 06:00 )             27.4     12-07    139  |  102  |  20  ----------------------------<  122<H>  4.9   |  33<H>  |  0.76    Ca    8.7      07 Dec 2022 06:00  Phos  4.3     12-07  Mg     2.3     12-07    TPro  7.5  /  Alb  1.6<L>  /  TBili  0.5  /  DBili  x   /  AST  24  /  ALT  10  /  AlkPhos  206<H>  12-06          RADIOLOGY & ADDITIONAL TESTS:

## 2022-12-07 NOTE — PROGRESS NOTE ADULT - ASSESSMENT
77F with dementia, COPD with chronic respiratory failure s/p trach, cardiac arrest, CHH, quadriplegia, CVA, CKD, anemia, PEG tube, and multiple hospital admissions for respiratory distress presents to the ED BIBEMS from [facility] for diaphoresis and fever.         COPD with chronic respiratory failure s/p trach  unable to wean off 100% FiO2  poor prognosis  GOC discussion needs to be revisited  pulmonary recs appreciated  CTA was ordered but unable to perform, pt cannot tolerated laying flat  U/S results reviewed with Dr. Sandoval, small lto moderate effusion, not enough to do thoracentesis for benefit  we won't be able to have the thoracentesis done, high risk per Dr. Sandoval and no benefit.   Advised  that pt is unable to get weaned off back to her baseline and we are out options at this time.  He wants to continue her to be full code.     ANemia  multifacotrial/ AICD   s/p 1 unit prbc since admission   H and H stable   continue supportive care    Sepsis 2/2 VAP - meets sepsis based on fever + tachycardia + source of infection.  Lactate 1.9  peristent hypotension  Completed unasyn 12/1/22  - cont with vent settings to maintain SaO2 >92%  - cont with midodrine 15mg TID, pressors PRN  - on ativan for agitation  - MRSA neg, trach culture +acinetobacter   wean as tolerating   Trend temps and cbc    Hyperkalemia  resolved  s/p lokelma     Hyponatremia  - Na of 130 today, likely hypovolemic  - urine lytes ordered  - continue to trend    Leukocytosis  - WBC WNL  - no fevers  comleted unasyn  - continue to trend     DM2 on insulin  - cont with lantus and insulin coverage scale with FS q6h while on TF    COPD   - cont with neb treatments    Anemia of chronic disease   - GI eval - conservative management   - cont with ferrous sulfate  - trend CBC    Prognosis grave, patient remains critically ill and is at high risk for abrupt decompensation and death

## 2022-12-07 NOTE — PROGRESS NOTE ADULT - SUBJECTIVE AND OBJECTIVE BOX
Date/Time Patient Seen:  		  Referring MD:   Data Reviewed	       Patient is a 77y old  Female who presents with a chief complaint of diaphoresis and fever (07 Dec 2022 06:24)      Subjective/HPI     PAST MEDICAL & SURGICAL HISTORY:  Dementia of frontal lobe type    Aphasic stroke    Diabetes mellitus    Respiratory failure    Hypertension    GERD (gastroesophageal reflux disease)    Constipation    Respiratory failure    CVA (cerebral vascular accident)    HTN (hypertension)    DM (diabetes mellitus)    Advanced dementia    COVID-19 virus detected    Quadriplegia    Pneumonia    Type II diabetes mellitus    Hx of appendectomy    Gastrostomy in place    Tracheostomy in place    Tracheostomy tube present    Feeding by G-tube          Medication list         MEDICATIONS  (STANDING):  albuterol    90 MICROgram(s) HFA Inhaler 2 Puff(s) Inhalation two times a day  chlorhexidine 0.12% Liquid 15 milliLiter(s) Oral Mucosa every 12 hours  chlorhexidine 2% Cloths 1 Application(s) Topical daily  dextrose 5%. 1000 milliLiter(s) (50 mL/Hr) IV Continuous <Continuous>  dextrose 5%. 1000 milliLiter(s) (100 mL/Hr) IV Continuous <Continuous>  dextrose 50% Injectable 25 Gram(s) IV Push once  dextrose 50% Injectable 12.5 Gram(s) IV Push once  dextrose 50% Injectable 25 Gram(s) IV Push once  glucagon  Injectable 1 milliGRAM(s) IntraMuscular once  heparin   Injectable 5000 Unit(s) SubCutaneous every 12 hours  insulin glargine Injectable (LANTUS) 8 Unit(s) SubCutaneous every morning  insulin lispro (ADMELOG) corrective regimen sliding scale   SubCutaneous every 6 hours  midodrine 5 milliGRAM(s) Oral every 8 hours  pantoprazole  Injectable 40 milliGRAM(s) IV Push daily  polyethylene glycol 3350 17 Gram(s) Oral every 12 hours  povidone iodine 10% Solution 1 Application(s) Topical daily  senna 2 Tablet(s) Oral at bedtime    MEDICATIONS  (PRN):  dextrose Oral Gel 15 Gram(s) Oral once PRN Blood Glucose LESS THAN 70 milliGRAM(s)/deciliter  fentaNYL    Injectable 50 MICROGram(s) IV Push every 6 hours PRN Tachypnea and Vent dyssynchrony  midazolam Injectable 2 milliGRAM(s) IV Push every 2 hours PRN Vent synchrony  sodium chloride 0.9% lock flush 10 milliLiter(s) IV Push every 1 hour PRN Pre/post blood products, medications, blood draw, and to maintain line patency         Vitals log        ICU Vital Signs Last 24 Hrs  T(C): 36.1 (07 Dec 2022 04:00), Max: 37.4 (06 Dec 2022 16:10)  T(F): 96.9 (07 Dec 2022 04:00), Max: 99.4 (06 Dec 2022 16:10)  HR: 89 (07 Dec 2022 05:45) (85 - 108)  BP: 134/83 (07 Dec 2022 04:00) (133/77 - 172/84)  BP(mean): 98 (07 Dec 2022 04:00) (93 - 109)  ABP: --  ABP(mean): --  RR: 26 (07 Dec 2022 04:00) (19 - 42)  SpO2: 96% (07 Dec 2022 05:45) (92% - 100%)    O2 Parameters below as of 07 Dec 2022 04:00  Patient On (Oxygen Delivery Method): ventilator    O2 Concentration (%): 100         Mode: AC/ CMV (Assist Control/ Continuous Mandatory Ventilation)  RR (machine): 18  TV (machine): 350  FiO2: 100  PEEP: 5  ITime: 1  MAP: 20  PIP: 45      Input and Output:  I&O's Detail    05 Dec 2022 07:01  -  06 Dec 2022 07:00  --------------------------------------------------------  IN:    Enteral Tube Flush: 300 mL    Free Water: 90 mL    Glucerna 1.5: 800 mL  Total IN: 1190 mL    OUT:  Total OUT: 0 mL    Total NET: 1190 mL      06 Dec 2022 07:01  -  07 Dec 2022 06:38  --------------------------------------------------------  IN:    Enteral Tube Flush: 325 mL    Free Water: 90 mL    Glucerna 1.5: 650 mL  Total IN: 1065 mL    OUT:    Incontinent per Collection Bag (mL): 300 mL  Total OUT: 300 mL    Total NET: 765 mL          Lab Data                        8.3    8.68  )-----------( 212      ( 06 Dec 2022 06:36 )             28.1     12-06    137  |  101  |  22  ----------------------------<  157<H>  5.0   |  30  |  0.88    Ca    8.6      06 Dec 2022 06:36  Phos  4.3     12-06  Mg     2.0     12-06    TPro  7.5  /  Alb  1.6<L>  /  TBili  0.5  /  DBili  x   /  AST  24  /  ALT  10  /  AlkPhos  206<H>  12-06            Review of Systems	      Objective     Physical Examination    heart s1s2  lung dec BS  head nc      Pertinent Lab findings & Imaging      Annalise:  NO   Adequate UO     I&O's Detail    05 Dec 2022 07:01  -  06 Dec 2022 07:00  --------------------------------------------------------  IN:    Enteral Tube Flush: 300 mL    Free Water: 90 mL    Glucerna 1.5: 800 mL  Total IN: 1190 mL    OUT:  Total OUT: 0 mL    Total NET: 1190 mL      06 Dec 2022 07:01  -  07 Dec 2022 06:38  --------------------------------------------------------  IN:    Enteral Tube Flush: 325 mL    Free Water: 90 mL    Glucerna 1.5: 650 mL  Total IN: 1065 mL    OUT:    Incontinent per Collection Bag (mL): 300 mL  Total OUT: 300 mL    Total NET: 765 mL               Discussed with:     Cultures:	        Radiology

## 2022-12-07 NOTE — PROGRESS NOTE ADULT - ASSESSMENT
77F with dementia, COPD with chronic respiratory failure s/p trach, cardiac arrest, CHH, quadriplegia, CVA, CKD, anemia, PEG tube, and multiple hospital admissions for respiratory distress presents to the ED BIBEMS from [facility] for diaphoresis and fever.       poor prognosis relayed to   he wishes to cont full code measures  versed added prn use -     GOC   pt is full code   is full code  assist with needs -   oral hygiene  skin care  recurrent admissions  long term resident of SNF  vent dep  anoxic brain injury - dementia - vegetative state

## 2022-12-07 NOTE — CHART NOTE - NSCHARTNOTESELECT_GEN_ALL_CORE
Nutrition Services
palliative medicine addendum/Event Note
Nutrition Services
Nutrition Services
pall med add/Event Note

## 2022-12-07 NOTE — CHART NOTE - NSCHARTNOTEFT_GEN_A_CORE
Assessment: As per H&P "The pt is a 77F with dementia, COPD with chronic respiratory failure s/p trach, cardiac arrest, CHH, quadriplegia, CVA, CKD, anemia, PEG tube, and multiple hospital admissions for respiratory distress presents to the ED [facility] from [facility] for diaphoresis and fever.   Patient unable to provide any history.  Patient was just discharged here from - for low grade fever, midline malfunction, leukocytosis, fever, concerning for sepsis possibly 2/2 unresolved VAP.  Was treated with meropenem.  Per notes from [facility], around 3am, patient was does "not look good" and was noted to be diaphoretic, tachypneic, and febrile to 100.9F.  Was sent here for further evaluation.  In the ED, patient was febrile to 101.5F and tachycardic to 112.  Labs showed leukocytosis of 16 (up from 6.5), hyperkalemia 5.4, ABG 7.59/36/273.  Patient is being readmitted for sepsis 2/2 presumably from VAP."      Pt seen for nutrition follow-up. Visited patient in room, with trach and vent dependent. No reported n/v/d/c, last BM . Observed feeds of Glucerna 1.5 running at goal rate via PEG, with goal rate of 50ml/hr x 20hrs (provides 1000 ml of formula, 1500kcal, 82.5g protein, 760 ml of free water) + standard 25ml hourly flush. Pertinent medications/nutrition labs reviewed; noted -220, In house ordered for Lantus 8 units, lispro sliding scale to aid in glucose management. Continue with current TF order and monitor GI tolerance to feeds, RD to remain available to adjust EN formulary, volume/rate as needed/upon request.    Factors impacting intake: [ ] none [ ] nausea  [ ] vomiting [ ] diarrhea [ ] constipation  [ ]chewing problems [ ] swallowing issues  [x ] other: NPO with TF    Diet Prescription: Diet, NPO with Tube Feed:   Tube Feeding Modality: Gastrostomy  Glucerna 1.5 Horacio  Total Volume for 24 Hours (mL): 1000  Continuous  Starting Tube Feed Rate {mL per Hour}: 10  Increase Tube Feed Rate by (mL): 10     Every 10 hours  Until Goal Tube Feed Rate (mL per Hour): 50  Tube Feed Duration (in Hours): 20  Tube Feed Start Time: 17:00  Free Water Flush  Pump   Rate (mL per Hour): 25   Frequency: Every Hour    Duration (Hours): 24 (22 @ 05:10)    Intake: feeds at goal    Daily Weight in k.9 (2022 08:40)  Weight in k.5 (2022 07:00)  Weight in k.5 (2022 04:00)  Weight in k.1 (2022 06:39)    -- weight fluctuated likely secondary to fluid shift, noted with edema, will continue to monitor wt trends as able    Pertinent Medications: MEDICATIONS  (STANDING):  albuterol    90 MICROgram(s) HFA Inhaler 2 Puff(s) Inhalation two times a day  chlorhexidine 0.12% Liquid 15 milliLiter(s) Oral Mucosa every 12 hours  chlorhexidine 2% Cloths 1 Application(s) Topical daily  dextrose 5%. 1000 milliLiter(s) (50 mL/Hr) IV Continuous <Continuous>  dextrose 5%. 1000 milliLiter(s) (100 mL/Hr) IV Continuous <Continuous>  dextrose 50% Injectable 25 Gram(s) IV Push once  dextrose 50% Injectable 12.5 Gram(s) IV Push once  dextrose 50% Injectable 25 Gram(s) IV Push once  glucagon  Injectable 1 milliGRAM(s) IntraMuscular once  heparin   Injectable 5000 Unit(s) SubCutaneous every 12 hours  insulin glargine Injectable (LANTUS) 8 Unit(s) SubCutaneous every morning  insulin lispro (ADMELOG) corrective regimen sliding scale   SubCutaneous every 6 hours  LORazepam     Tablet 1 milliGRAM(s) Oral every 4 hours  midodrine 15 milliGRAM(s) Oral every 8 hours  norepinephrine Infusion 0.05 MICROgram(s)/kG/Min (5.35 mL/Hr) IV Continuous <Continuous>  pantoprazole  Injectable 40 milliGRAM(s) IV Push daily  polyethylene glycol 3350 17 Gram(s) Oral every 12 hours  povidone iodine 10% Solution 1 Application(s) Topical daily  senna 2 Tablet(s) Oral at bedtime  trimethoprim / sulfamethoxazole IVPB 285 milliGRAM(s) IV Intermittent every 8 hours    MEDICATIONS  (PRN):  dextrose Oral Gel 15 Gram(s) Oral once PRN Blood Glucose LESS THAN 70 milliGRAM(s)/deciliter  LORazepam   Injectable 2 milliGRAM(s) IV Push every 6 hours PRN Agitation  sodium chloride 0.9% lock flush 10 milliLiter(s) IV Push every 1 hour PRN Pre/post blood products, medications, blood draw, and to maintain line patency    Pertinent Labs:  Na130 mmol/L<L> Glu 115 mg/dL<H> K+ 4.8 mmol/L Cr  1.13 mg/dL BUN 28 mg/dL<H>  Phos 3.0 mg/dL  Alb 1.8 g/dL<L>     CAPILLARY BLOOD GLUCOSE      POCT Blood Glucose.: 220 mg/dL (2022 08:15)  POCT Blood Glucose.: 121 mg/dL (2022 05:49)  POCT Blood Glucose.: 171 mg/dL (2022 00:00)  POCT Blood Glucose.: 132 mg/dL (2022 17:53)  POCT Blood Glucose.: 140 mg/dL (2022 13:32)      Edema per flowsheets: +1 bl arms; geovanny upper arms  Skin: pressure injury stage 4-glueteal; stage 2-sacrum; UST r 5th toe      Estimated Needs:   [ x] no change since previous assessment  [ ] recalculated:     Previous Nutrition Diagnosis:   [ ] Inadequate Energy Intake [ ]Inadequate Oral Intake [ ] Excessive Energy Intake   [ ] Underweight [ ] Increased Nutrient Needs [ ] Overweight/Obesity [ ] Altered Nutrition related lab values  [ ] Altered GI Function [ ] Unintended Weight Loss [ ] Food & Nutrition Related Knowledge Deficit [ ] Malnutrition [x] Swallowing Difficulty    Nutrition Diagnosis is [x ] ongoing  [ ] resolved [ ] not applicable     New Nutrition Diagnosis: [x ]  Increased Nutrient Needs related to increased demands in setting of wound healing needs as evidenced by pressure injury stage 2 and >      Interventions: Continue with current TF order and monitor GI tolerance to feeds  Recommend  [ ] Change Diet To:  [x ] Nutrition Supplement: monitor needs for additional protein supplement to aid in wound healing given current TF regimen provides adequate protein   [ ] Nutrition Support  [ ] Other:     Monitoring and Evaluation:   [X ] Intake [ x ] Tolerance to diet prescription [ x ] weights [ x ] labs[ x ] follow up per protocol  [ ] other:
Assessment: As per H&P "The pt is a 77F with dementia, COPD with chronic respiratory failure s/p trach, cardiac arrest, CHH, quadriplegia, CVA, CKD, anemia, PEG tube, and multiple hospital admissions for respiratory distress presents to the ED [facility] from [facility] for diaphoresis and fever.   Patient unable to provide any history.  Patient was just discharged here from - for low grade fever, midline malfunction, leukocytosis, fever, concerning for sepsis possibly 2/2 unresolved VAP.  Was treated with meropenem.  Per notes from [facility], around 3am, patient was does "not look good" and was noted to be diaphoretic, tachypneic, and febrile to 100.9F.  Was sent here for further evaluation.  In the ED, patient was febrile to 101.5F and tachycardic to 112.  Labs showed leukocytosis of 16 (up from 6.5), hyperkalemia 5.4, ABG 7.59/36/273.  Patient is being readmitted for sepsis 2/2 presumably from VAP."      Pt seen for nutrition follow-up. Visited patient in room, with trach and vent dependent. No reported n/v/d/c, last BM . Observed feeds of Glucerna 1.5 running at goal rate via PEG, with goal rate of 50ml/hr x 20hrs (provides 1000 ml of formula, 1500kcal, 82.5g protein, 760 ml of free water) + standard 25ml hourly flush. Pertinent medications/nutrition labs reviewed; noted -186 x24 hrs, In house ordered for Lantus 8 units, lispro sliding scale to aid in glucose management. Continue with current TF order and monitor GI tolerance to feeds, RD to remain available to adjust EN formulary, volume/rate as needed/upon request.      Factors impacting intake: [ ] none [ ] nausea  [ ] vomiting [ ] diarrhea [ ] constipation  [ ]chewing problems [ ] swallowing issues  [x ] other: NPO with TF    Diet Prescription: Diet, NPO with Tube Feed:   Tube Feeding Modality: Gastrostomy  Glucerna 1.5 Horacio  Total Volume for 24 Hours (mL): 1000  Continuous  Starting Tube Feed Rate {mL per Hour}: 10  Increase Tube Feed Rate by (mL): 10     Every 10 hours  Until Goal Tube Feed Rate (mL per Hour): 50  Tube Feed Duration (in Hours): 20  Tube Feed Start Time: 17:00  Free Water Flush  Pump   Rate (mL per Hour): 25   Frequency: Every Hour    Duration (Hours): 24 (22 @ 05:10)    Intake: feeds at goal    Daily Weight in k.8 (2022 03:26)  Weight in k.3 (2022 04:03)  Weight in k.5 (2022 06:00)  Weight in k.9 (2022 08:40)    -- weight fluctuated likely secondary to fluid shift, noted with edema in house, will continue to monitor weight trends as able    Pertinent Medications: MEDICATIONS  (STANDING):  albuterol    90 MICROgram(s) HFA Inhaler 2 Puff(s) Inhalation two times a day  ampicillin/sulbactam  IVPB 3 Gram(s) IV Intermittent every 6 hours  chlorhexidine 2% Cloths 1 Application(s) Topical daily  dexMEDEtomidine Infusion 0.5 MICROgram(s)/kG/Hr (7.14 mL/Hr) IV Continuous <Continuous>  dextrose 5%. 1000 milliLiter(s) (50 mL/Hr) IV Continuous <Continuous>  dextrose 5%. 1000 milliLiter(s) (100 mL/Hr) IV Continuous <Continuous>  dextrose 50% Injectable 25 Gram(s) IV Push once  dextrose 50% Injectable 12.5 Gram(s) IV Push once  dextrose 50% Injectable 25 Gram(s) IV Push once  glucagon  Injectable 1 milliGRAM(s) IntraMuscular once  heparin   Injectable 5000 Unit(s) SubCutaneous every 12 hours  insulin glargine Injectable (LANTUS) 8 Unit(s) SubCutaneous every morning  insulin lispro (ADMELOG) corrective regimen sliding scale   SubCutaneous every 6 hours  LORazepam     Tablet 1 milliGRAM(s) Oral every 4 hours  midodrine 15 milliGRAM(s) Oral every 8 hours  norepinephrine Infusion 0.05 MICROgram(s)/kG/Min (5.35 mL/Hr) IV Continuous <Continuous>  pantoprazole  Injectable 40 milliGRAM(s) IV Push daily  polyethylene glycol 3350 17 Gram(s) Oral every 12 hours  povidone iodine 10% Solution 1 Application(s) Topical daily  senna 2 Tablet(s) Oral at bedtime  sodium chloride 0.9%. 1000 milliLiter(s) (100 mL/Hr) IV Continuous <Continuous>    MEDICATIONS  (PRN):  dextrose Oral Gel 15 Gram(s) Oral once PRN Blood Glucose LESS THAN 70 milliGRAM(s)/deciliter  LORazepam   Injectable 2 milliGRAM(s) IV Push every 6 hours PRN Agitation  sodium chloride 0.9% lock flush 10 milliLiter(s) IV Push every 1 hour PRN Pre/post blood products, medications, blood draw, and to maintain line patency    Pertinent Labs:  Na139 mmol/L Glu 118 mg/dL<H> K+ 4.2 mmol/L Cr  1.18 mg/dL BUN 26 mg/dL<H>  Phos 3.3 mg/dL  Alb 1.5 g/dL<L>     CAPILLARY BLOOD GLUCOSE      POCT Blood Glucose.: 162 mg/dL (2022 12:07)  POCT Blood Glucose.: 131 mg/dL (2022 05:15)  POCT Blood Glucose.: 140 mg/dL (2022 23:40)  POCT Blood Glucose.: 186 mg/dL (2022 17:35)      Edema per flowsheets: +2 generalized; +3 bl arm  Skin:  pressure injury stage 4-glueteal; stage 2-sacrum; UST r 5th toe      Estimated Needs:   [x ] no change since previous assessment  [ ] recalculated:     Previous Nutrition Diagnosis:   [ ] Inadequate Energy Intake [ ]Inadequate Oral Intake [ ] Excessive Energy Intake   [ ] Underweight [ ] Increased Nutrient Needs [ ] Overweight/Obesity [ ] Altered Nutrition related lab values  [ ] Altered GI Function [ ] Unintended Weight Loss [ ] Food & Nutrition Related Knowledge Deficit [ ] Malnutrition  [x] Swallowing Difficulty    Nutrition Diagnosis is [x ] ongoing  [ ] resolved [ ] not applicable     New Nutrition Diagnosis: [x ] not applicable       Interventions: Continue with current TF order and monitor GI tolerance to feeds  Recommend  [ ] Change Diet To:  [x ] Nutrition Supplement: monitor needs for additional protein supplement to aid in wound healing given current TF regimen provides adequate protein   [ ] Nutrition Support  [ ] Other:     Monitoring and Evaluation:   [X ] Intake [ x ] Tolerance to diet prescription [ x ] weights [ x ] labs[ x ] follow up per protocol  [ ] other:
spoke with   pt remains full code
Assessment: As per H&P "The pt is a 77F with dementia, COPD with chronic respiratory failure s/p trach, cardiac arrest, CHH, quadriplegia, CVA, CKD, anemia, PEG tube, and multiple hospital admissions for respiratory distress presents to the ED BIBEMS from [facility] for diaphoresis and fever.   Patient unable to provide any history.  Patient was just discharged here from - for low grade fever, midline malfunction, leukocytosis, fever, concerning for sepsis possibly 2/2 unresolved VAP.  Was treated with meropenem.  Per notes from [facility], around 3am, patient was does "not look good" and was noted to be diaphoretic, tachypneic, and febrile to 100.9F.  Was sent here for further evaluation.  In the ED, patient was febrile to 101.5F and tachycardic to 112.  Labs showed leukocytosis of 16 (up from 6.5), hyperkalemia 5.4, ABG 7.59/36/273.  Patient is being readmitted for sepsis 2/2 presumably from VAP."      Pt seen for nutrition follow-up. Pt known to writer from multiple admission. Visited patient in room, with trach and vent dependent. Per rounds, pt remains full code, tolerating feeds, plan to continue with current tube feed regimen. No reported n/v/d/c, last BM . Observed feeds of Glucerna 1.5 running at goal rate via PEG, with goal rate of 50ml/hr x 20hrs (provides 1000 ml of formula, 1500kcal, 82.5g protein, 760 ml of free water) + standard 25ml hourly flush. Pertinent medications/nutrition labs reviewed; noted -168 x24 hrs, In house ordered for Lantus 8 units, lispro sliding scale to aid in glucose management. Continue with current TF order and monitor GI tolerance to feeds, RD to remain available to adjust EN formulary, volume/rate as needed/upon request.    Factors impacting intake: [ ] none [ ] nausea  [ ] vomiting [ ] diarrhea [ ] constipation  [ ]chewing problems [ ] swallowing issues  [ x] other: NPO with tube feed    Diet Prescription: Diet, NPO with Tube Feed:   Tube Feeding Modality: Gastrostomy  Glucerna 1.5 Horacio  Total Volume for 24 Hours (mL): 1000  Continuous  Starting Tube Feed Rate {mL per Hour}: 10  Increase Tube Feed Rate by (mL): 10     Every 10 hours  Until Goal Tube Feed Rate (mL per Hour): 50  Tube Feed Duration (in Hours): 20  Tube Feed Start Time: 17:00  Free Water Flush  Pump   Rate (mL per Hour): 25   Frequency: Every Hour    Duration (Hours): 24 (22 @ 05:10)    Intake: feeds at goal    Daily Weight in k (06 Dec 2022 08:03)  Weight in k.6 (05 Dec 2022 05:00)  Weight in k.5 (04 Dec 2022 08:30)  Weight in k.3 (03 Dec 2022 11:48)  Weight in k.5 (02 Dec 2022 06:15)  Weight in k.8 (01 Dec 2022 07:20)  Weight in k.8 (2022 05:00)    -- weight fluctuated likely secondary to fluid shift, noted with edema in house, will continue to monitor weight trends as able    Pertinent Medications: MEDICATIONS  (STANDING):  albuterol    90 MICROgram(s) HFA Inhaler 2 Puff(s) Inhalation two times a day  chlorhexidine 0.12% Liquid 15 milliLiter(s) Oral Mucosa every 12 hours  chlorhexidine 2% Cloths 1 Application(s) Topical daily  dextrose 5%. 1000 milliLiter(s) (50 mL/Hr) IV Continuous <Continuous>  dextrose 5%. 1000 milliLiter(s) (100 mL/Hr) IV Continuous <Continuous>  dextrose 50% Injectable 25 Gram(s) IV Push once  dextrose 50% Injectable 12.5 Gram(s) IV Push once  dextrose 50% Injectable 25 Gram(s) IV Push once  glucagon  Injectable 1 milliGRAM(s) IntraMuscular once  heparin   Injectable 5000 Unit(s) SubCutaneous every 12 hours  insulin glargine Injectable (LANTUS) 8 Unit(s) SubCutaneous every morning  insulin lispro (ADMELOG) corrective regimen sliding scale   SubCutaneous every 6 hours  LORazepam     Tablet 1 milliGRAM(s) Oral every 4 hours  midodrine 5 milliGRAM(s) Oral every 8 hours  pantoprazole  Injectable 40 milliGRAM(s) IV Push daily  polyethylene glycol 3350 17 Gram(s) Oral every 12 hours  povidone iodine 10% Solution 1 Application(s) Topical daily  senna 2 Tablet(s) Oral at bedtime    MEDICATIONS  (PRN):  dextrose Oral Gel 15 Gram(s) Oral once PRN Blood Glucose LESS THAN 70 milliGRAM(s)/deciliter  fentaNYL    Injectable 50 MICROGram(s) IV Push every 6 hours PRN Tachypnea and Vent dyssynchrony  LORazepam   Injectable 2 milliGRAM(s) IV Push every 6 hours PRN Agitation  sodium chloride 0.9% lock flush 10 milliLiter(s) IV Push every 1 hour PRN Pre/post blood products, medications, blood draw, and to maintain line patency    Pertinent Labs:  Na137 mmol/L Glu 157 mg/dL<H> K+ 5.0 mmol/L Cr  0.88 mg/dL BUN 22 mg/dL  Phos 4.3 mg/dL  Alb 1.6 g/dL<L>     CAPILLARY BLOOD GLUCOSE      POCT Blood Glucose.: 168 mg/dL (06 Dec 2022 05:36)  POCT Blood Glucose.: 168 mg/dL (06 Dec 2022 00:31)  POCT Blood Glucose.: 163 mg/dL (05 Dec 2022 17:25)      Edema per flowsheets:+2 generalized; bl arm  Skin:  pressure injury stage 4-glueteal; stage 2-sacrum; UST r 5th toe      Estimated Needs:   [x ] no change since previous assessment  [ ] recalculated:     Previous Nutrition Diagnosis:   [ ] Inadequate Energy Intake [ ]Inadequate Oral Intake [ ] Excessive Energy Intake   [ ] Underweight [ ] Increased Nutrient Needs [ ] Overweight/Obesity [ ] Altered Nutrition related lab values  [ ] Altered GI Function [ ] Unintended Weight Loss [ ] Food & Nutrition Related Knowledge Deficit [ ] Malnutrition  [x] Swallowing Difficulty    Nutrition Diagnosis is [ x] ongoing  [ ] resolved [ ] not applicable     New Nutrition Diagnosis: [x ] not applicable       Interventions: Continue with current TF order and monitor GI tolerance to feeds  Recommend  [ ] Change Diet To:  [x ] Nutrition Supplement: monitor needs for additional protein supplement to aid in wound healing given current TF regimen provides adequate protein   [ ] Nutrition Support  [ ] Other:       Monitoring and Evaluation:   [X ] Intake [ x ] Tolerance to diet prescription [ x ] weights [ x ] labs[ x ] follow up per protocol  [ ] other:
spoke with son and   discussed palliative measures  discussed terminal liberation  discussed code status  family will get back to me

## 2022-12-07 NOTE — PROGRESS NOTE ADULT - SUBJECTIVE AND OBJECTIVE BOX
Amandeep Villeda M.D.    Patient is a 77y old  Female who presents with a chief complaint of diaphoresis and fever (07 Dec 2022 06:38)      SUBJECTIVE / OVERNIGHT EVENTS: no event overnight. ROS unable to be obtained.     MEDICATIONS  (STANDING):  albuterol    90 MICROgram(s) HFA Inhaler 2 Puff(s) Inhalation two times a day  chlorhexidine 0.12% Liquid 15 milliLiter(s) Oral Mucosa every 12 hours  chlorhexidine 2% Cloths 1 Application(s) Topical daily  dextrose 5%. 1000 milliLiter(s) (50 mL/Hr) IV Continuous <Continuous>  dextrose 5%. 1000 milliLiter(s) (100 mL/Hr) IV Continuous <Continuous>  dextrose 50% Injectable 25 Gram(s) IV Push once  dextrose 50% Injectable 12.5 Gram(s) IV Push once  dextrose 50% Injectable 25 Gram(s) IV Push once  glucagon  Injectable 1 milliGRAM(s) IntraMuscular once  heparin   Injectable 5000 Unit(s) SubCutaneous every 12 hours  insulin glargine Injectable (LANTUS) 8 Unit(s) SubCutaneous every morning  insulin lispro (ADMELOG) corrective regimen sliding scale   SubCutaneous every 6 hours  pantoprazole  Injectable 40 milliGRAM(s) IV Push daily  polyethylene glycol 3350 17 Gram(s) Oral every 12 hours  povidone iodine 10% Solution 1 Application(s) Topical daily  senna 2 Tablet(s) Oral at bedtime    MEDICATIONS  (PRN):  dextrose Oral Gel 15 Gram(s) Oral once PRN Blood Glucose LESS THAN 70 milliGRAM(s)/deciliter  fentaNYL    Injectable 50 MICROGram(s) IV Push every 6 hours PRN Tachypnea and Vent dyssynchrony  midazolam Injectable 2 milliGRAM(s) IV Push every 2 hours PRN Vent synchrony  sodium chloride 0.9% lock flush 10 milliLiter(s) IV Push every 1 hour PRN Pre/post blood products, medications, blood draw, and to maintain line patency      I&O's Summary    06 Dec 2022 07:01  -  07 Dec 2022 07:00  --------------------------------------------------------  IN: 1065 mL / OUT: 750 mL / NET: 315 mL        PHYSICAL EXAM:  Vital Signs Last 24 Hrs  T(C): 36.9 (07 Dec 2022 08:04), Max: 37.4 (06 Dec 2022 16:10)  T(F): 98.4 (07 Dec 2022 08:04), Max: 99.4 (06 Dec 2022 16:10)  HR: 87 (07 Dec 2022 10:34) (87 - 108)  BP: 123/63 (07 Dec 2022 10:00) (97/67 - 172/84)  BP(mean): 81 (07 Dec 2022 10:00) (76 - 109)  RR: 24 (07 Dec 2022 10:00) (19 - 42)  SpO2: 100% (07 Dec 2022 10:34) (92% - 100%)    Parameters below as of 07 Dec 2022 06:45  Patient On (Oxygen Delivery Method): ventilator    GENERAL: chronically ill appearing, emaciated, obtunded  NECK: (+)trach in place, clean  RESPIRATORY: mechanical breath sounds, diminished, vent in place, no gross crackles or rales  CARDIOVASCULAR:  regular rate and rhythm, no murmurs or rubs or gallops, 2+ peripheral pulses  GASTROINTESTINAL:  soft, +PEG in place with drainage noted, nondistended, bowel sounds present  EXTREMITIES: no clubbing or cyanosis or edema  MUSCULOSKELETAL: (+)diffuse contractures in all joints  NERVOUS SYSTEM: does not respond to pain    LABS:                        7.9    7.10  )-----------( 214      ( 07 Dec 2022 06:00 )             27.4     12-07    139  |  102  |  20  ----------------------------<  122<H>  4.9   |  33<H>  |  0.76    Ca    8.7      07 Dec 2022 06:00  Phos  4.3     12-07  Mg     2.3     12-07    TPro  7.5  /  Alb  1.6<L>  /  TBili  0.5  /  DBili  x   /  AST  24  /  ALT  10  /  AlkPhos  206<H>  12-06              CAPILLARY BLOOD GLUCOSE      POCT Blood Glucose.: 109 mg/dL (07 Dec 2022 11:26)  POCT Blood Glucose.: 128 mg/dL (07 Dec 2022 07:34)  POCT Blood Glucose.: 148 mg/dL (07 Dec 2022 00:29)  POCT Blood Glucose.: 154 mg/dL (06 Dec 2022 17:33)  POCT Blood Glucose.: 184 mg/dL (06 Dec 2022 13:02)      RADIOLOGY & ADDITIONAL TESTS:  Results Reviewed:   Imaging Personally Reviewed:  Electrocardiogram Personally Reviewed:

## 2022-12-07 NOTE — PROGRESS NOTE ADULT - SUBJECTIVE AND OBJECTIVE BOX
BREA BECKHAM     [facility] 03    Allergies    codeine (Hives)    Intolerances        PAST MEDICAL & SURGICAL HISTORY:  Dementia of frontal lobe type      Aphasic stroke      Diabetes mellitus      Respiratory failure      Hypertension      GERD (gastroesophageal reflux disease)      Constipation      Respiratory failure      CVA (cerebral vascular accident)      HTN (hypertension)      DM (diabetes mellitus)      Advanced dementia      COVID-19 virus detected      Quadriplegia      Pneumonia      Type II diabetes mellitus      Hx of appendectomy      Gastrostomy in place      Tracheostomy in place      Tracheostomy tube present      Feeding by G-tube          FAMILY HISTORY:  No pertinent family history in first degree relatives        Home Medications:  Admelog 100 units/mL injectable solution: injectable every 6 hours SS (21 Nov 2022 05:08)  albuterol 90 mcg/inh inhalation aerosol with adapter: 2  inhaled every 6 hours (21 Nov 2022 04:24)  Bacid (LAC) oral tablet: 2 tab(s) by gastrostomy tube once a day (21 Nov 2022 04:24)  bacitracin 500 units/g topical ointment: Apply topically to affected area once a day to knees (21 Nov 2022 04:24)  chlorhexidine 0.12% mucous membrane liquid: 15 milliliter(s) mucous membrane 2 times a day (21 Nov 2022 04:24)  Dakins Full Strength 0.5% topical solution: Apply topically to affected area 2 times a day then apply santyl and calcium alginate (21 Nov 2022 04:24)  Eucerin topical cream: Apply topically to affected area once a day bilateral feet (21 Nov 2022 04:24)  ferrous sulfate 325 mg (65 mg elemental iron) oral tablet: 1 tab(s) by gastrostomy tube once a day (21 Nov 2022 04:24)  insulin glargine 100 units/mL subcutaneous solution: 8 unit(s) subcutaneous once a day (in the morning) (21 Nov 2022 04:24)  ipratropium-albuterol 0.5 mg-2.5 mg/3 mL inhalation solution: 3 milliliter(s) inhaled every 6 hours (21 Nov 2022 04:24)  LORazepam 1 mg oral tablet: 1 tab(s) by gastrostomy tube every 4 hours (21 Nov 2022 04:24)  midodrine 10 mg oral tablet: 1 tab(s) by gastrostomy tube every 8 hours (21 Nov 2022 04:24)  MiraLax oral powder for reconstitution: orally once a day (at bedtime) (21 Nov 2022 05:08)  mulivitamin: by gastrostomy tube once a day (21 Nov 2022 04:24)  nystatin 100,000 units/g topical powder: 1 application topically 3 times a day (21 Nov 2022 04:24)  omeprazole 40 mg oral delayed release capsule: 1 cap(s) by gastrostomy tube 2 times a day (21 Nov 2022 04:24)  polyethylene glycol 3350 oral powder for reconstitution: 17 gram(s) by gastrostomy tube every 12 hours (21 Nov 2022 04:24)  senna 8.6 mg oral tablet: 3 tab(s) by gastrostomy tube once a day (at bedtime) (21 Nov 2022 04:24)  simethicone 80 mg oral tablet, chewable: 1 tab(s) by gastrostomy tube every 6 hours (21 Nov 2022 04:24)  sucralfate 1 g/10 mL oral suspension: 10 milliliter(s) g-tube 4 times a day (before meals and at bedtime) (21 Nov 2022 04:24)  Tylenol 325 mg oral tablet: 2 tab(s) orally every 6 hours, As Needed prior to dressing change (21 Nov 2022 04:24)      MEDICATIONS  (STANDING):  albuterol    90 MICROgram(s) HFA Inhaler 2 Puff(s) Inhalation two times a day  chlorhexidine 0.12% Liquid 15 milliLiter(s) Oral Mucosa every 12 hours  chlorhexidine 2% Cloths 1 Application(s) Topical daily  dextrose 5%. 1000 milliLiter(s) (50 mL/Hr) IV Continuous <Continuous>  dextrose 5%. 1000 milliLiter(s) (100 mL/Hr) IV Continuous <Continuous>  dextrose 50% Injectable 25 Gram(s) IV Push once  dextrose 50% Injectable 12.5 Gram(s) IV Push once  dextrose 50% Injectable 25 Gram(s) IV Push once  glucagon  Injectable 1 milliGRAM(s) IntraMuscular once  heparin   Injectable 5000 Unit(s) SubCutaneous every 12 hours  insulin glargine Injectable (LANTUS) 8 Unit(s) SubCutaneous every morning  insulin lispro (ADMELOG) corrective regimen sliding scale   SubCutaneous every 6 hours  midodrine 5 milliGRAM(s) Oral every 8 hours  pantoprazole  Injectable 40 milliGRAM(s) IV Push daily  polyethylene glycol 3350 17 Gram(s) Oral every 12 hours  povidone iodine 10% Solution 1 Application(s) Topical daily  senna 2 Tablet(s) Oral at bedtime    MEDICATIONS  (PRN):  dextrose Oral Gel 15 Gram(s) Oral once PRN Blood Glucose LESS THAN 70 milliGRAM(s)/deciliter  fentaNYL    Injectable 50 MICROGram(s) IV Push every 6 hours PRN Tachypnea and Vent dyssynchrony  midazolam Injectable 2 milliGRAM(s) IV Push every 2 hours PRN Vent synchrony  sodium chloride 0.9% lock flush 10 milliLiter(s) IV Push every 1 hour PRN Pre/post blood products, medications, blood draw, and to maintain line patency      Diet, NPO with Tube Feed:   Tube Feeding Modality: Gastrostomy  Glucerna 1.5 Horacio  Total Volume for 24 Hours (mL): 1000  Continuous  Starting Tube Feed Rate mL per Hour: 10  Increase Tube Feed Rate by (mL): 10     Every 10 hours  Until Goal Tube Feed Rate (mL per Hour): 50  Tube Feed Duration (in Hours): 20  Tube Feed Start Time: 17:00  Free Water Flush  Pump   Rate (mL per Hour): 25   Frequency: Every Hour    Duration (Hours): 24 (11-21-22 @ 05:10) [Active]          Vital Signs Last 24 Hrs  T(C): 36.1 (07 Dec 2022 04:00), Max: 37.4 (06 Dec 2022 16:10)  T(F): 96.9 (07 Dec 2022 04:00), Max: 99.4 (06 Dec 2022 16:10)  HR: 89 (07 Dec 2022 05:45) (85 - 108)  BP: 134/83 (07 Dec 2022 04:00) (133/77 - 172/84)  BP(mean): 98 (07 Dec 2022 04:00) (93 - 109)  RR: 26 (07 Dec 2022 04:00) (19 - 42)  SpO2: 96% (07 Dec 2022 05:45) (92% - 100%)    Parameters below as of 07 Dec 2022 04:00  Patient On (Oxygen Delivery Method): ventilator    O2 Concentration (%): 100      12-05-22 @ 07:01  -  12-06-22 @ 07:00  --------------------------------------------------------  IN: 1190 mL / OUT: 0 mL / NET: 1190 mL    12-06-22 @ 07:01  -  12-07-22 @ 06:24  --------------------------------------------------------  IN: 1065 mL / OUT: 300 mL / NET: 765 mL        Mode: AC/ CMV (Assist Control/ Continuous Mandatory Ventilation), RR (machine): 18, TV (machine): 350, FiO2: 100, PEEP: 5, ITime: 1, MAP: 20, PIP: 45      LABS:                        8.3    8.68  )-----------( 212      ( 06 Dec 2022 06:36 )             28.1     12-06    137  |  101  |  22  ----------------------------<  157<H>  5.0   |  30  |  0.88    Ca    8.6      06 Dec 2022 06:36  Phos  4.3     12-06  Mg     2.0     12-06    TPro  7.5  /  Alb  1.6<L>  /  TBili  0.5  /  DBili  x   /  AST  24  /  ALT  10  /  AlkPhos  206<H>  12-06              WBC:  WBC Count: 8.68 K/uL (12-06 @ 06:36)  WBC Count: 9.21 K/uL (12-05 @ 06:20)  WBC Count: 14.63 K/uL (12-04 @ 05:45)  WBC Count: 8.87 K/uL (12-03 @ 06:27)      MICROBIOLOGY:  RECENT CULTURES:                  Sodium:  Sodium, Serum: 137 mmol/L (12-06 @ 06:36)  Sodium, Serum: 139 mmol/L (12-05 @ 06:20)  Sodium, Serum: 139 mmol/L (12-04 @ 05:45)  Sodium, Serum: 141 mmol/L (12-03 @ 06:27)      0.88 mg/dL 12-06 @ 06:36  0.96 mg/dL 12-05 @ 06:20  1.03 mg/dL 12-04 @ 05:45  0.94 mg/dL 12-03 @ 06:27      Hemoglobin:  Hemoglobin: 8.3 g/dL (12-06 @ 06:36)  Hemoglobin: 8.0 g/dL (12-05 @ 06:20)  Hemoglobin: 8.6 g/dL (12-04 @ 05:45)  Hemoglobin: 8.3 g/dL (12-03 @ 06:27)      Platelets: Platelet Count - Automated: 212 K/uL (12-06 @ 06:36)  Platelet Count - Automated: 166 K/uL (12-05 @ 06:20)  Platelet Count - Automated: 184 K/uL (12-04 @ 05:45)  Platelet Count - Automated: 185 K/uL (12-03 @ 06:27)      LIVER FUNCTIONS - ( 06 Dec 2022 06:36 )  Alb: 1.6 g/dL / Pro: 7.5 g/dL / ALK PHOS: 206 U/L / ALT: 10 U/L DA / AST: 24 U/L / GGT: x                 RADIOLOGY & ADDITIONAL STUDIES:      MICROBIOLOGY:  RECENT CULTURES:

## 2022-12-07 NOTE — PROGRESS NOTE ADULT - ASSESSMENT
The patient is a 77 year old female with a history of HTN, DM, CVA, dementia, chronic respiratory failure s/p trach, PEG, cardiac arrest, anemia, pleural effusions who presents with fever and respiratory distress.    Plan:  - Repeat echo with normal LV systolic function and worsened pulmonary pressures, now severe pulm HTN  - CXR with diffuse lung infiltrates  - Discontinue midodrine  - Low oncotic pressure leading to third spacing - diuretics likely to have minimal long term effect  - Severe pulm HTN - not a candidate for advanced pulmonary therapies  - Hypoxia requiring max vent settings - likely due to ARDS like picture and severe pulm HTN. Cannot exclude PE although patient is a poor candidate for long term anticoagulation due to prior bleeds and anemia.  - Pulm and ID follow-up  - Overall prognosis remains extremely poor. Comfort care would be most appropriate.

## 2022-12-07 NOTE — PROCEDURE NOTE - PROCEDURE FINDINGS AND DETAILS
20cm bard power midline inserted into patent right brachial vein under sterile conditions and ultrasound guidance.  4cm of catheter is external, 16cm internal.

## 2022-12-07 NOTE — PROGRESS NOTE ADULT - SUBJECTIVE AND OBJECTIVE BOX
[facility], Division of Infectious Diseases  MOIZ Lora Y. Patel, S. Shah, G. [facility]  [phone]    Name: BREA BECKHAM  Age: 77y  Gender: Female  MRN: [id number]    Interval History:  Patient seen and examined at bedside  No acute overnight events. Afebrile  S/p midline placement today  Notes reviewed    Antibiotics:      Medications:  albuterol    90 MICROgram(s) HFA Inhaler 2 Puff(s) Inhalation two times a day  chlorhexidine 0.12% Liquid 15 milliLiter(s) Oral Mucosa every 12 hours  chlorhexidine 2% Cloths 1 Application(s) Topical daily  dextrose 5%. 1000 milliLiter(s) IV Continuous <Continuous>  dextrose 5%. 1000 milliLiter(s) IV Continuous <Continuous>  dextrose 50% Injectable 25 Gram(s) IV Push once  dextrose 50% Injectable 12.5 Gram(s) IV Push once  dextrose 50% Injectable 25 Gram(s) IV Push once  dextrose Oral Gel 15 Gram(s) Oral once PRN  fentaNYL    Injectable 50 MICROGram(s) IV Push every 6 hours PRN  glucagon  Injectable 1 milliGRAM(s) IntraMuscular once  heparin   Injectable 5000 Unit(s) SubCutaneous every 12 hours  insulin glargine Injectable (LANTUS) 8 Unit(s) SubCutaneous every morning  insulin lispro (ADMELOG) corrective regimen sliding scale   SubCutaneous every 6 hours  midazolam Injectable 2 milliGRAM(s) IV Push every 2 hours PRN  pantoprazole  Injectable 40 milliGRAM(s) IV Push daily  polyethylene glycol 3350 17 Gram(s) Oral every 12 hours  povidone iodine 10% Solution 1 Application(s) Topical daily  senna 2 Tablet(s) Oral at bedtime  sodium chloride 0.9% lock flush 10 milliLiter(s) IV Push every 1 hour PRN      Review of Systems: unable to obtain    Allergies: codeine (Hives)    For details regarding the patient's past medical history, social history, family history, and other miscellaneous elements, please refer the initial infectious diseases consultation and/or the admitting history and physical examination for this admission.    Objective:  Vitals:   T(C): 37 (12-07-22 @ 12:19), Max: 37.4 (12-06-22 @ 16:10)  HR: 89 (12-07-22 @ 12:00) (87 - 108)  BP: 134/76 (12-07-22 @ 12:00) (97/67 - 172/84)  RR: 21 (12-07-22 @ 12:00) (21 - 42)  SpO2: 97% (12-07-22 @ 12:00) (92% - 100%)    Mode: AC/ CMV (Assist Control/ Continuous Mandatory Ventilation)  RR (machine): 18  TV (machine): 350  FiO2: 100  PEEP: 5  ITime: 1  MAP: 20  PIP: 43      Physical Examination:  General: vented pt  HEENT: NC/AT  Neck: trach  Cardio: S1, S2 heard, RRR, no murmurs  Resp: MV breath sounds  Abd: distended  Ext: no edema or cyanosis  Skin: warm, dry, no visible rash      Laboratory Studies:  CBC:                       7.9    7.10  )-----------( 214      ( 07 Dec 2022 06:00 )             27.4     CMP: 12-07    139  |  102  |  20  ----------------------------<  122<H>  4.9   |  33<H>  |  0.76    Ca    8.7      07 Dec 2022 06:00  Phos  4.3     12-07  Mg     2.3     12-07    TPro  7.5  /  Alb  1.6<L>  /  TBili  0.5  /  DBili  x   /  AST  24  /  ALT  10  /  AlkPhos  206<H>  12-06    LIVER FUNCTIONS - ( 06 Dec 2022 06:36 )  Alb: 1.6 g/dL / Pro: 7.5 g/dL / ALK PHOS: 206 U/L / ALT: 10 U/L DA / AST: 24 U/L / GGT: x               Microbiology: reviewed        Radiology: reviewed    < from: US Chest (12.06.22 @ 07:52) >    ACC: [id number] EXAM:  US CHEST                          PROCEDURE DATE:  12/06/2022          INTERPRETATION:  CLINICAL INDICATION: Evaluate pleural effusion    EXAMINATION: Chest ultrasound.    COMPARISON: Chest x-ray 11/26/2022, 12/6/2022    FINDINGS /  IMPRESSION:    Bilateral pleural effusions, small-to-moderate in size, approximately 484   cc volume on the right and 646 cc volume on the left.    --- End of Report ---            CAYETANO CH M.D., ATTENDING RADIOLOGIST  This document has been electronically signed. Dec  6 2022  8:24AM    < end of copied text >

## 2022-12-08 LAB
ANION GAP SERPL CALC-SCNC: 8 MMOL/L — SIGNIFICANT CHANGE UP (ref 5–17)
BUN SERPL-MCNC: 18 MG/DL — SIGNIFICANT CHANGE UP (ref 7–23)
CALCIUM SERPL-MCNC: 8.6 MG/DL — SIGNIFICANT CHANGE UP (ref 8.4–10.5)
CHLORIDE SERPL-SCNC: 100 MMOL/L — SIGNIFICANT CHANGE UP (ref 96–108)
CO2 SERPL-SCNC: 30 MMOL/L — SIGNIFICANT CHANGE UP (ref 22–31)
CREAT SERPL-MCNC: 0.84 MG/DL — SIGNIFICANT CHANGE UP (ref 0.5–1.3)
EGFR: 71 ML/MIN/1.73M2 — SIGNIFICANT CHANGE UP
GLUCOSE SERPL-MCNC: 152 MG/DL — HIGH (ref 70–99)
HCT VFR BLD CALC: 28 % — LOW (ref 34.5–45)
HGB BLD-MCNC: 8.3 G/DL — LOW (ref 11.5–15.5)
MAGNESIUM SERPL-MCNC: 2.3 MG/DL — SIGNIFICANT CHANGE UP (ref 1.6–2.6)
MCHC RBC-ENTMCNC: 27.5 PG — SIGNIFICANT CHANGE UP (ref 27–34)
MCHC RBC-ENTMCNC: 29.6 GM/DL — LOW (ref 32–36)
MCV RBC AUTO: 92.7 FL — SIGNIFICANT CHANGE UP (ref 80–100)
NRBC # BLD: 0 /100 WBCS — SIGNIFICANT CHANGE UP (ref 0–0)
PHOSPHATE SERPL-MCNC: 5 MG/DL — HIGH (ref 2.5–4.5)
PLATELET # BLD AUTO: 242 K/UL — SIGNIFICANT CHANGE UP (ref 150–400)
POTASSIUM SERPL-MCNC: 5.2 MMOL/L — SIGNIFICANT CHANGE UP (ref 3.5–5.3)
POTASSIUM SERPL-SCNC: 5.2 MMOL/L — SIGNIFICANT CHANGE UP (ref 3.5–5.3)
RBC # BLD: 3.02 M/UL — LOW (ref 3.8–5.2)
RBC # FLD: 15.6 % — HIGH (ref 10.3–14.5)
SODIUM SERPL-SCNC: 138 MMOL/L — SIGNIFICANT CHANGE UP (ref 135–145)
WBC # BLD: 8.09 K/UL — SIGNIFICANT CHANGE UP (ref 3.8–10.5)
WBC # FLD AUTO: 8.09 K/UL — SIGNIFICANT CHANGE UP (ref 3.8–10.5)

## 2022-12-08 PROCEDURE — 86923 COMPATIBILITY TEST ELECTRIC: CPT

## 2022-12-08 PROCEDURE — 86900 BLOOD TYPING SEROLOGIC ABO: CPT

## 2022-12-08 PROCEDURE — 83880 ASSAY OF NATRIURETIC PEPTIDE: CPT

## 2022-12-08 PROCEDURE — 80053 COMPREHEN METABOLIC PANEL: CPT

## 2022-12-08 PROCEDURE — P9016: CPT

## 2022-12-08 PROCEDURE — 71275 CT ANGIOGRAPHY CHEST: CPT

## 2022-12-08 PROCEDURE — 36430 TRANSFUSION BLD/BLD COMPNT: CPT

## 2022-12-08 PROCEDURE — 99285 EMERGENCY DEPT VISIT HI MDM: CPT

## 2022-12-08 PROCEDURE — 87641 MR-STAPH DNA AMP PROBE: CPT

## 2022-12-08 PROCEDURE — 87899 AGENT NOS ASSAY W/OPTIC: CPT

## 2022-12-08 PROCEDURE — 82803 BLOOD GASES ANY COMBINATION: CPT

## 2022-12-08 PROCEDURE — 87040 BLOOD CULTURE FOR BACTERIA: CPT

## 2022-12-08 PROCEDURE — 93970 EXTREMITY STUDY: CPT

## 2022-12-08 PROCEDURE — 0225U NFCT DS DNA&RNA 21 SARSCOV2: CPT

## 2022-12-08 PROCEDURE — 85610 PROTHROMBIN TIME: CPT

## 2022-12-08 PROCEDURE — 87449 NOS EACH ORGANISM AG IA: CPT

## 2022-12-08 PROCEDURE — 87077 CULTURE AEROBIC IDENTIFY: CPT

## 2022-12-08 PROCEDURE — 82962 GLUCOSE BLOOD TEST: CPT

## 2022-12-08 PROCEDURE — 86850 RBC ANTIBODY SCREEN: CPT

## 2022-12-08 PROCEDURE — 36415 COLL VENOUS BLD VENIPUNCTURE: CPT

## 2022-12-08 PROCEDURE — 83935 ASSAY OF URINE OSMOLALITY: CPT

## 2022-12-08 PROCEDURE — 87070 CULTURE OTHR SPECIMN AEROBIC: CPT

## 2022-12-08 PROCEDURE — 87637 SARSCOV2&INF A&B&RSV AMP PRB: CPT

## 2022-12-08 PROCEDURE — 94002 VENT MGMT INPAT INIT DAY: CPT

## 2022-12-08 PROCEDURE — 85730 THROMBOPLASTIN TIME PARTIAL: CPT

## 2022-12-08 PROCEDURE — 94799 UNLISTED PULMONARY SVC/PX: CPT

## 2022-12-08 PROCEDURE — 86901 BLOOD TYPING SEROLOGIC RH(D): CPT

## 2022-12-08 PROCEDURE — 87181 SC STD AGAR DILUTION PER AGT: CPT

## 2022-12-08 PROCEDURE — 87635 SARS-COV-2 COVID-19 AMP PRB: CPT

## 2022-12-08 PROCEDURE — 83735 ASSAY OF MAGNESIUM: CPT

## 2022-12-08 PROCEDURE — 87186 SC STD MICRODIL/AGAR DIL: CPT

## 2022-12-08 PROCEDURE — 94640 AIRWAY INHALATION TREATMENT: CPT

## 2022-12-08 PROCEDURE — 84100 ASSAY OF PHOSPHORUS: CPT

## 2022-12-08 PROCEDURE — 87184 SC STD DISK METHOD PER PLATE: CPT

## 2022-12-08 PROCEDURE — 84145 PROCALCITONIN (PCT): CPT

## 2022-12-08 PROCEDURE — 84484 ASSAY OF TROPONIN QUANT: CPT

## 2022-12-08 PROCEDURE — 93306 TTE W/DOPPLER COMPLETE: CPT

## 2022-12-08 PROCEDURE — 80048 BASIC METABOLIC PNL TOTAL CA: CPT

## 2022-12-08 PROCEDURE — 87086 URINE CULTURE/COLONY COUNT: CPT

## 2022-12-08 PROCEDURE — 36573 INSJ PICC RS&I 5 YR+: CPT

## 2022-12-08 PROCEDURE — 83605 ASSAY OF LACTIC ACID: CPT

## 2022-12-08 PROCEDURE — 71045 X-RAY EXAM CHEST 1 VIEW: CPT

## 2022-12-08 PROCEDURE — 76937 US GUIDE VASCULAR ACCESS: CPT

## 2022-12-08 PROCEDURE — 99233 SBSQ HOSP IP/OBS HIGH 50: CPT

## 2022-12-08 PROCEDURE — C1751: CPT

## 2022-12-08 PROCEDURE — 85027 COMPLETE CBC AUTOMATED: CPT

## 2022-12-08 PROCEDURE — 81001 URINALYSIS AUTO W/SCOPE: CPT

## 2022-12-08 PROCEDURE — 87640 STAPH A DNA AMP PROBE: CPT

## 2022-12-08 PROCEDURE — 96365 THER/PROPH/DIAG IV INF INIT: CPT

## 2022-12-08 PROCEDURE — 84300 ASSAY OF URINE SODIUM: CPT

## 2022-12-08 PROCEDURE — 94003 VENT MGMT INPAT SUBQ DAY: CPT

## 2022-12-08 PROCEDURE — 85025 COMPLETE CBC W/AUTO DIFF WBC: CPT

## 2022-12-08 PROCEDURE — 76604 US EXAM CHEST: CPT

## 2022-12-08 PROCEDURE — 93005 ELECTROCARDIOGRAM TRACING: CPT

## 2022-12-08 RX ORDER — HYDROMORPHONE HYDROCHLORIDE 2 MG/ML
1 INJECTION INTRAMUSCULAR; INTRAVENOUS; SUBCUTANEOUS
Refills: 0 | Status: DISCONTINUED | OUTPATIENT
Start: 2022-12-08 | End: 2022-12-08

## 2022-12-08 RX ADMIN — MIDAZOLAM HYDROCHLORIDE 2 MILLIGRAM(S): 1 INJECTION, SOLUTION INTRAMUSCULAR; INTRAVENOUS at 08:57

## 2022-12-08 RX ADMIN — PANTOPRAZOLE SODIUM 40 MILLIGRAM(S): 20 TABLET, DELAYED RELEASE ORAL at 11:13

## 2022-12-08 RX ADMIN — HEPARIN SODIUM 5000 UNIT(S): 5000 INJECTION INTRAVENOUS; SUBCUTANEOUS at 05:57

## 2022-12-08 RX ADMIN — FENTANYL CITRATE 50 MICROGRAM(S): 50 INJECTION INTRAVENOUS at 05:57

## 2022-12-08 RX ADMIN — ALBUTEROL 2 PUFF(S): 90 AEROSOL, METERED ORAL at 06:51

## 2022-12-08 RX ADMIN — INSULIN GLARGINE 8 UNIT(S): 100 INJECTION, SOLUTION SUBCUTANEOUS at 08:26

## 2022-12-08 RX ADMIN — FENTANYL CITRATE 50 MICROGRAM(S): 50 INJECTION INTRAVENOUS at 07:00

## 2022-12-08 RX ADMIN — HYDROMORPHONE HYDROCHLORIDE 1 MILLIGRAM(S): 2 INJECTION INTRAMUSCULAR; INTRAVENOUS; SUBCUTANEOUS at 13:04

## 2022-12-08 RX ADMIN — CHLORHEXIDINE GLUCONATE 1 APPLICATION(S): 213 SOLUTION TOPICAL at 07:14

## 2022-12-08 RX ADMIN — HYDROMORPHONE HYDROCHLORIDE 1 MILLIGRAM(S): 2 INJECTION INTRAMUSCULAR; INTRAVENOUS; SUBCUTANEOUS at 12:19

## 2022-12-08 RX ADMIN — CHLORHEXIDINE GLUCONATE 15 MILLILITER(S): 213 SOLUTION TOPICAL at 05:57

## 2022-12-08 RX ADMIN — Medication 2: at 06:19

## 2022-12-08 RX ADMIN — MIDAZOLAM HYDROCHLORIDE 2 MILLIGRAM(S): 1 INJECTION, SOLUTION INTRAMUSCULAR; INTRAVENOUS at 11:13

## 2022-12-08 RX ADMIN — HYDROMORPHONE HYDROCHLORIDE 1 MILLIGRAM(S): 2 INJECTION INTRAMUSCULAR; INTRAVENOUS; SUBCUTANEOUS at 12:39

## 2022-12-08 NOTE — PROGRESS NOTE ADULT - ASSESSMENT
REVIEW OF SYMPTOMS      Able to give (reliable) ROS  NO     PHYSICAL EXAM    HEENT Unremarkable  atraumatic   RESP Fair air entry EXP prolonged    Harsh breath sound Resp distres mild   CARDIAC S1 S2 No S3     NO JVD    ABDOMEN SOFT BS PRESENT NOT DISTENDED No hepatosplenomegaly   PEDAL EDEMA present No calf tenderness  NO rash       GENERAL DATA .   GOC.  full code      ALLGY.     codeine                        WT. 11/21/2022 57  BMI.    11/21/2022 21                          ICU STAY.   admitted ICU 11/21/2022 for shock requiring vasopressors   COVID.  Scv2 11/21/2022 scv2 (-)    PROCEDURE.  11/21/2022 rij central line  12/7/2022 midline     BEST PRACTICE ISSUES.    HOB ELEVATN. Yes  DVT PPLX.  11/21/2022 hpsc    SQUIRES PPLX.    11/21/2022 protonix 40   INFN PPLX.    11/21/2022 chlorhexidine .12% bid (mrsa)   11/21/2022 chlorhexidine 2% (c li)   SP SW EM.         DIET.  11/21/2022 glucerna 1.5 1000 gt                 CURRENT ADMISSION  Pt sent back from [facility] 11/21/2022 with fever abn labs Found to be in shock Norepi started 11/21/2022   Pulm crit care consulted      RECENT ADMISSIONS.  11/12-11/16/2022 11/4-11/10/2022  11/5 stenotrophomonas  uc 11/4 vr enterococc 50-99 candida   11/4/2022 levaquin 750 dced 11/7 doxy  11/8 bactrim 250.3  10/18-11/2/2022      CURRENT PROBLEMS.  Vent dependent   .. Trach poa 11/21/2022  COPD  Pleural effsn   RO VTE  .. 11/26 v duplx (-)   RIJ 11/21/2022   Pulmonary hypertension   .. 11/30/2022 echo n lvsf dilated rv n rvs sf ph 91        INFECTN   .. Decub ulcers   .. VAP 11/21/2022  ...... trach 11/21 mod acinetobacter carbapenem resist   ...... sp Bactrim tid  11/21-11/25/2022  ...... 11/25/2022 Unasyn 3.4 x 7d Dr Chairez      Shock   .. midodrine 11/21/2022     peg poa 11/21/2022  ANEMIA   .. Hb 11/25-12/4/2022 Hb 7.5- 8.6    PLAN  Family by bedside  Considering terminal wean   palliative care on case          TIME SPENT   Over 39 minutes aggregate critical care time spent on encounter; activities included   direct patient care, counseling and/or coordinating care reviewing notes, lab data/ imaging , discussion with multidisciplinary team/ patient  /family and explaining in detail risks, benefits, alternatives  of the recommendations     CHAPINCITO GUIDRY 76 f [facility] S 11/21/2022   DR JOSEPH DU

## 2022-12-08 NOTE — GOALS OF CARE CONVERSATION - ADVANCED CARE PLANNING - CONVERSATION DETAILS
and multiple family members at bedside.  They state they came to say goodbye as they know she has been deteriorating despite maximal care.  They repeatedly asked that she no longer be in pain and that she be removed from the ventilator.   Bianca filled out with DNR - comfort care in place.   Ventilator to be disconnected from trach.

## 2022-12-08 NOTE — PROGRESS NOTE ADULT - SUBJECTIVE AND OBJECTIVE BOX
Chief Complaint: Respiratory distress    Interval Events: No events overnight.    Review of Systems:  Unable to obtain    Physical Exam:  Vital Signs Last 24 Hrs  T(C): 36.2 (08 Dec 2022 07:56), Max: 37 (07 Dec 2022 12:19)  T(F): 97.1 (08 Dec 2022 07:56), Max: 98.6 (07 Dec 2022 12:19)  HR: 102 (08 Dec 2022 07:01) (87 - 104)  BP: 167/84 (08 Dec 2022 06:00) (123/63 - 167/84)  BP(mean): 106 (08 Dec 2022 06:00) (81 - 106)  RR: 19 (08 Dec 2022 06:00) (19 - 43)  SpO2: 100% (08 Dec 2022 07:01) (91% - 100%)  Parameters below as of 07 Dec 2022 20:50  Patient On (Oxygen Delivery Method): ventilator  General: Unresponsive  HEENT: MMM  Neck: No JVD, no carotid bruit  Lungs: CTAB  CV: RRR, nl S1/S2, no M/R/G  Abdomen: S/NT/ND, +BS  Extremities: 2+ LE edema, no cyanosis  Neuro: AAOx0  Skin: No rash    Labs:    12-08    138  |  100  |  18  ----------------------------<  152<H>  5.2   |  30  |  0.84    Ca    8.6      08 Dec 2022 06:56  Phos  5.0     12-08  Mg     2.3     12-08                          8.3    8.09  )-----------( 242      ( 08 Dec 2022 06:56 )             28.0       ECG/Telemetry: Sinus rhythm

## 2022-12-08 NOTE — PROGRESS NOTE ADULT - SUBJECTIVE AND OBJECTIVE BOX
Amandeep Villeda M.D.    Patient is a 77y old  Female who presents with a chief complaint of diaphoresis and fever (08 Dec 2022 09:55)      SUBJECTIVE / OVERNIGHT EVENTS: no event overnight. Trach and vented.     MEDICATIONS  (STANDING):  albuterol    90 MICROgram(s) HFA Inhaler 2 Puff(s) Inhalation two times a day  chlorhexidine 0.12% Liquid 15 milliLiter(s) Oral Mucosa every 12 hours  chlorhexidine 2% Cloths 1 Application(s) Topical daily  dextrose 5%. 1000 milliLiter(s) (50 mL/Hr) IV Continuous <Continuous>  dextrose 5%. 1000 milliLiter(s) (100 mL/Hr) IV Continuous <Continuous>  dextrose 5%. 1000 milliLiter(s) (50 mL/Hr) IV Continuous <Continuous>  dextrose 50% Injectable 25 Gram(s) IV Push once  dextrose 50% Injectable 12.5 Gram(s) IV Push once  dextrose 50% Injectable 25 Gram(s) IV Push once  glucagon  Injectable 1 milliGRAM(s) IntraMuscular once  heparin   Injectable 5000 Unit(s) SubCutaneous every 12 hours  insulin glargine Injectable (LANTUS) 8 Unit(s) SubCutaneous every morning  insulin lispro (ADMELOG) corrective regimen sliding scale   SubCutaneous every 6 hours  pantoprazole  Injectable 40 milliGRAM(s) IV Push daily  polyethylene glycol 3350 17 Gram(s) Oral every 12 hours  povidone iodine 10% Solution 1 Application(s) Topical daily  senna 2 Tablet(s) Oral at bedtime    MEDICATIONS  (PRN):  dextrose Oral Gel 15 Gram(s) Oral once PRN Blood Glucose LESS THAN 70 milliGRAM(s)/deciliter  fentaNYL    Injectable 50 MICROGram(s) IV Push every 6 hours PRN Tachypnea and Vent dyssynchrony  midazolam Injectable 2 milliGRAM(s) IV Push every 2 hours PRN Vent synchrony  sodium chloride 0.9% lock flush 10 milliLiter(s) IV Push every 1 hour PRN Pre/post blood products, medications, blood draw, and to maintain line patency      I&O's Summary    07 Dec 2022 07:01  -  08 Dec 2022 07:00  --------------------------------------------------------  IN: 550 mL / OUT: 700 mL / NET: -150 mL        PHYSICAL EXAM:  Vital Signs Last 24 Hrs  T(C): 36.2 (08 Dec 2022 07:56), Max: 37 (07 Dec 2022 12:19)  T(F): 97.1 (08 Dec 2022 07:56), Max: 98.6 (07 Dec 2022 12:19)  HR: 99 (08 Dec 2022 08:00) (87 - 104)  BP: 156/75 (08 Dec 2022 08:00) (134/76 - 167/84)  BP(mean): 97 (08 Dec 2022 08:00) (90 - 106)  RR: 36 (08 Dec 2022 08:00) (19 - 43)  SpO2: 100% (08 Dec 2022 08:00) (91% - 100%)    Parameters below as of 08 Dec 2022 08:00  Patient On (Oxygen Delivery Method): ventilator    GENERAL: chronically ill appearing, emaciated, obtunded, agonal  NECK: (+)trach in place, clean  RESPIRATORY: mechanical breath sounds, diminished, vent in place, agonal  CARDIOVASCULAR:  regular rate and rhythm, no murmurs or rubs or gallops, 2+ peripheral pulses  GASTROINTESTINAL:  soft, +PEG in place with drainage noted, nondistended, bowel sounds present  EXTREMITIES: no clubbing or cyanosis or edema  MUSCULOSKELETAL: (+)diffuse contractures in all joints  NERVOUS SYSTEM: does not respond to pain    LABS:                        8.3    8.09  )-----------( 242      ( 08 Dec 2022 06:56 )             28.0     12-08    138  |  100  |  18  ----------------------------<  152<H>  5.2   |  30  |  0.84    Ca    8.6      08 Dec 2022 06:56  Phos  5.0     12-08  Mg     2.3     12-08                CAPILLARY BLOOD GLUCOSE      POCT Blood Glucose.: 158 mg/dL (08 Dec 2022 06:14)  POCT Blood Glucose.: 133 mg/dL (07 Dec 2022 22:57)  POCT Blood Glucose.: 90 mg/dL (07 Dec 2022 17:41)  POCT Blood Glucose.: 109 mg/dL (07 Dec 2022 11:26)      RADIOLOGY & ADDITIONAL TESTS:  Results Reviewed:   Imaging Personally Reviewed:  Electrocardiogram Personally Reviewed:

## 2022-12-08 NOTE — PROGRESS NOTE ADULT - SUBJECTIVE AND OBJECTIVE BOX
BREA BECKHAM     [facility] 03    Allergies    codeine (Hives)    Intolerances        PAST MEDICAL & SURGICAL HISTORY:  Dementia of frontal lobe type      Aphasic stroke      Diabetes mellitus      Respiratory failure      Hypertension      GERD (gastroesophageal reflux disease)      Constipation      Respiratory failure      CVA (cerebral vascular accident)      HTN (hypertension)      DM (diabetes mellitus)      Advanced dementia      COVID-19 virus detected      Quadriplegia      Pneumonia      Type II diabetes mellitus      Hx of appendectomy      Gastrostomy in place      Tracheostomy in place      Tracheostomy tube present      Feeding by G-tube          FAMILY HISTORY:  No pertinent family history in first degree relatives        Home Medications:  Admelog 100 units/mL injectable solution: injectable every 6 hours SS (21 Nov 2022 05:08)  albuterol 90 mcg/inh inhalation aerosol with adapter: 2  inhaled every 6 hours (21 Nov 2022 04:24)  Bacid (LAC) oral tablet: 2 tab(s) by gastrostomy tube once a day (21 Nov 2022 04:24)  bacitracin 500 units/g topical ointment: Apply topically to affected area once a day to knees (21 Nov 2022 04:24)  chlorhexidine 0.12% mucous membrane liquid: 15 milliliter(s) mucous membrane 2 times a day (21 Nov 2022 04:24)  Dakins Full Strength 0.5% topical solution: Apply topically to affected area 2 times a day then apply santyl and calcium alginate (21 Nov 2022 04:24)  Eucerin topical cream: Apply topically to affected area once a day bilateral feet (21 Nov 2022 04:24)  ferrous sulfate 325 mg (65 mg elemental iron) oral tablet: 1 tab(s) by gastrostomy tube once a day (21 Nov 2022 04:24)  insulin glargine 100 units/mL subcutaneous solution: 8 unit(s) subcutaneous once a day (in the morning) (21 Nov 2022 04:24)  ipratropium-albuterol 0.5 mg-2.5 mg/3 mL inhalation solution: 3 milliliter(s) inhaled every 6 hours (21 Nov 2022 04:24)  LORazepam 1 mg oral tablet: 1 tab(s) by gastrostomy tube every 4 hours (21 Nov 2022 04:24)  midodrine 10 mg oral tablet: 1 tab(s) by gastrostomy tube every 8 hours (21 Nov 2022 04:24)  MiraLax oral powder for reconstitution: orally once a day (at bedtime) (21 Nov 2022 05:08)  mulivitamin: by gastrostomy tube once a day (21 Nov 2022 04:24)  nystatin 100,000 units/g topical powder: 1 application topically 3 times a day (21 Nov 2022 04:24)  omeprazole 40 mg oral delayed release capsule: 1 cap(s) by gastrostomy tube 2 times a day (21 Nov 2022 04:24)  polyethylene glycol 3350 oral powder for reconstitution: 17 gram(s) by gastrostomy tube every 12 hours (21 Nov 2022 04:24)  senna 8.6 mg oral tablet: 3 tab(s) by gastrostomy tube once a day (at bedtime) (21 Nov 2022 04:24)  simethicone 80 mg oral tablet, chewable: 1 tab(s) by gastrostomy tube every 6 hours (21 Nov 2022 04:24)  sucralfate 1 g/10 mL oral suspension: 10 milliliter(s) g-tube 4 times a day (before meals and at bedtime) (21 Nov 2022 04:24)  Tylenol 325 mg oral tablet: 2 tab(s) orally every 6 hours, As Needed prior to dressing change (21 Nov 2022 04:24)      MEDICATIONS  (STANDING):  albuterol    90 MICROgram(s) HFA Inhaler 2 Puff(s) Inhalation two times a day  chlorhexidine 0.12% Liquid 15 milliLiter(s) Oral Mucosa every 12 hours  chlorhexidine 2% Cloths 1 Application(s) Topical daily  dextrose 5%. 1000 milliLiter(s) (50 mL/Hr) IV Continuous <Continuous>  dextrose 5%. 1000 milliLiter(s) (100 mL/Hr) IV Continuous <Continuous>  dextrose 5%. 1000 milliLiter(s) (50 mL/Hr) IV Continuous <Continuous>  dextrose 50% Injectable 25 Gram(s) IV Push once  dextrose 50% Injectable 12.5 Gram(s) IV Push once  dextrose 50% Injectable 25 Gram(s) IV Push once  glucagon  Injectable 1 milliGRAM(s) IntraMuscular once  heparin   Injectable 5000 Unit(s) SubCutaneous every 12 hours  insulin glargine Injectable (LANTUS) 8 Unit(s) SubCutaneous every morning  insulin lispro (ADMELOG) corrective regimen sliding scale   SubCutaneous every 6 hours  pantoprazole  Injectable 40 milliGRAM(s) IV Push daily  polyethylene glycol 3350 17 Gram(s) Oral every 12 hours  povidone iodine 10% Solution 1 Application(s) Topical daily  senna 2 Tablet(s) Oral at bedtime    MEDICATIONS  (PRN):  dextrose Oral Gel 15 Gram(s) Oral once PRN Blood Glucose LESS THAN 70 milliGRAM(s)/deciliter  fentaNYL    Injectable 50 MICROGram(s) IV Push every 6 hours PRN Tachypnea and Vent dyssynchrony  midazolam Injectable 2 milliGRAM(s) IV Push every 2 hours PRN Vent synchrony  sodium chloride 0.9% lock flush 10 milliLiter(s) IV Push every 1 hour PRN Pre/post blood products, medications, blood draw, and to maintain line patency      Diet, NPO with Tube Feed:   Tube Feeding Modality: Gastrostomy  Glucerna 1.5 Horacio  Total Volume for 24 Hours (mL): 1000  Continuous  Starting Tube Feed Rate mL per Hour: 10  Increase Tube Feed Rate by (mL): 10     Every 10 hours  Until Goal Tube Feed Rate (mL per Hour): 50  Tube Feed Duration (in Hours): 20  Tube Feed Start Time: 17:00  Free Water Flush  Pump   Rate (mL per Hour): 25   Frequency: Every Hour    Duration (Hours): 24 (11-21-22 @ 05:10) [Active]          Vital Signs Last 24 Hrs  T(C): 36.2 (08 Dec 2022 07:56), Max: 37 (07 Dec 2022 12:19)  T(F): 97.1 (08 Dec 2022 07:56), Max: 98.6 (07 Dec 2022 12:19)  HR: 99 (08 Dec 2022 08:00) (87 - 104)  BP: 156/75 (08 Dec 2022 08:00) (123/63 - 167/84)  BP(mean): 97 (08 Dec 2022 08:00) (81 - 106)  RR: 36 (08 Dec 2022 08:00) (19 - 43)  SpO2: 100% (08 Dec 2022 08:00) (91% - 100%)    Parameters below as of 08 Dec 2022 08:00  Patient On (Oxygen Delivery Method): ventilator          12-07-22 @ 07:01  -  12-08-22 @ 07:00  --------------------------------------------------------  IN: 550 mL / OUT: 700 mL / NET: -150 mL        Mode: PRVC, RR (machine): 18, TV (machine): 350, FiO2: 100, PEEP: 5, ITime: 1, MAP: 19, PIP: 37      LABS:                        8.3    8.09  )-----------( 242      ( 08 Dec 2022 06:56 )             28.0     12-08    138  |  100  |  18  ----------------------------<  152<H>  5.2   |  30  |  0.84    Ca    8.6      08 Dec 2022 06:56  Phos  5.0     12-08  Mg     2.3     12-08                WBC:  WBC Count: 8.09 K/uL (12-08 @ 06:56)  WBC Count: 7.10 K/uL (12-07 @ 06:00)  WBC Count: 8.68 K/uL (12-06 @ 06:36)  WBC Count: 9.21 K/uL (12-05 @ 06:20)      MICROBIOLOGY:  RECENT CULTURES:                  Sodium:  Sodium, Serum: 138 mmol/L (12-08 @ 06:56)  Sodium, Serum: 139 mmol/L (12-07 @ 06:00)  Sodium, Serum: 137 mmol/L (12-06 @ 06:36)  Sodium, Serum: 139 mmol/L (12-05 @ 06:20)      0.84 mg/dL 12-08 @ 06:56  0.76 mg/dL 12-07 @ 06:00  0.88 mg/dL 12-06 @ 06:36  0.96 mg/dL 12-05 @ 06:20      Hemoglobin:  Hemoglobin: 8.3 g/dL (12-08 @ 06:56)  Hemoglobin: 7.9 g/dL (12-07 @ 06:00)  Hemoglobin: 8.3 g/dL (12-06 @ 06:36)  Hemoglobin: 8.0 g/dL (12-05 @ 06:20)      Platelets: Platelet Count - Automated: 242 K/uL (12-08 @ 06:56)  Platelet Count - Automated: 214 K/uL (12-07 @ 06:00)  Platelet Count - Automated: 212 K/uL (12-06 @ 06:36)  Platelet Count - Automated: 166 K/uL (12-05 @ 06:20)              RADIOLOGY & ADDITIONAL STUDIES:      MICROBIOLOGY:  RECENT CULTURES:

## 2022-12-08 NOTE — PROGRESS NOTE ADULT - ASSESSMENT
77F with dementia, COPD with chronic respiratory failure s/p trach, cardiac arrest, CHH, quadriplegia, CVA, CKD, anemia, PEG tube, and multiple hospital admissions for respiratory distress presents to the ED BIBEMS from [facility] for diaphoresis and fever.       COPD with chronic respiratory failure s/p trach  unable to wean off 100% FiO2  poor prognosis  GOC discussion needs to be revisited  pulmonary recs appreciated  CTA was ordered but unable to perform, pt cannot tolerated laying flat  U/S results reviewed with Dr. Sandoval, small lto moderate effusion, not enough to do thoracentesis for benefit  we won't be able to have the thoracentesis done, high risk per Dr. Sandoval and no benefit.   Advised  that pt is unable to get weaned off back to her baseline and we are out options at this time  Pending family decision about possible terminal weaning    Anemia  multifacotrial/ AICD   s/p 1 unit prbc since admission   H and H stable   continue supportive care    Sepsis 2/2 VAP - meets sepsis based on fever + tachycardia + source of infection.  Lactate 1.9  peristent hypotension  Completed unasyn 12/1/22  cont with vent settings to maintain SaO2 >92%  cont with midodrine 15mg TID, pressors PRN  on ativan for agitation  MRSA neg, trach culture +acinetobacter   wean as tolerating   Trend temps and cbc    Hyperkalemia  resolved  s/p lokelma     DM2 on insulin  cont with lantus and insulin coverage scale with FS q6h while on TF    COPD   cont with neb treatments    Anemia of chronic disease   GI eval - conservative management   cont with ferrous sulfate  trend CBC    Prognosis terminal. Patient remains critically ill and is at high risk for abrupt decompensation and death

## 2022-12-08 NOTE — PROGRESS NOTE ADULT - ASSESSMENT
spoke with family -  and son   they are considering CMO and compassionate ventilator liberation  discussion in progress  prognosis poor

## 2022-12-08 NOTE — PROGRESS NOTE ADULT - SUBJECTIVE AND OBJECTIVE BOX
Date/Time Patient Seen:  		  Referring MD:   Data Reviewed	       Patient is a 77y old  Female who presents with a chief complaint of diaphoresis and fever (07 Dec 2022 19:39)      Subjective/HPI     PAST MEDICAL & SURGICAL HISTORY:  Dementia of frontal lobe type    Aphasic stroke    Diabetes mellitus    Respiratory failure    Hypertension    GERD (gastroesophageal reflux disease)    Constipation    Respiratory failure    CVA (cerebral vascular accident)    HTN (hypertension)    DM (diabetes mellitus)    Advanced dementia    COVID-19 virus detected    Quadriplegia    Pneumonia    Type II diabetes mellitus    Hx of appendectomy    Gastrostomy in place    Tracheostomy in place    Tracheostomy tube present    Feeding by G-tube          Medication list         MEDICATIONS  (STANDING):  albuterol    90 MICROgram(s) HFA Inhaler 2 Puff(s) Inhalation two times a day  chlorhexidine 0.12% Liquid 15 milliLiter(s) Oral Mucosa every 12 hours  chlorhexidine 2% Cloths 1 Application(s) Topical daily  dextrose 5%. 1000 milliLiter(s) (50 mL/Hr) IV Continuous <Continuous>  dextrose 5%. 1000 milliLiter(s) (50 mL/Hr) IV Continuous <Continuous>  dextrose 5%. 1000 milliLiter(s) (100 mL/Hr) IV Continuous <Continuous>  dextrose 50% Injectable 25 Gram(s) IV Push once  dextrose 50% Injectable 12.5 Gram(s) IV Push once  dextrose 50% Injectable 25 Gram(s) IV Push once  glucagon  Injectable 1 milliGRAM(s) IntraMuscular once  heparin   Injectable 5000 Unit(s) SubCutaneous every 12 hours  insulin glargine Injectable (LANTUS) 8 Unit(s) SubCutaneous every morning  insulin lispro (ADMELOG) corrective regimen sliding scale   SubCutaneous every 6 hours  pantoprazole  Injectable 40 milliGRAM(s) IV Push daily  polyethylene glycol 3350 17 Gram(s) Oral every 12 hours  povidone iodine 10% Solution 1 Application(s) Topical daily  senna 2 Tablet(s) Oral at bedtime    MEDICATIONS  (PRN):  dextrose Oral Gel 15 Gram(s) Oral once PRN Blood Glucose LESS THAN 70 milliGRAM(s)/deciliter  fentaNYL    Injectable 50 MICROGram(s) IV Push every 6 hours PRN Tachypnea and Vent dyssynchrony  midazolam Injectable 2 milliGRAM(s) IV Push every 2 hours PRN Vent synchrony  sodium chloride 0.9% lock flush 10 milliLiter(s) IV Push every 1 hour PRN Pre/post blood products, medications, blood draw, and to maintain line patency         Vitals log        ICU Vital Signs Last 24 Hrs  T(C): 36.8 (08 Dec 2022 06:00), Max: 37 (07 Dec 2022 12:19)  T(F): 98.3 (08 Dec 2022 06:00), Max: 98.6 (07 Dec 2022 12:19)  HR: 100 (08 Dec 2022 05:45) (87 - 104)  BP: 158/73 (08 Dec 2022 02:00) (123/63 - 158/73)  BP(mean): 97 (08 Dec 2022 02:00) (81 - 100)  ABP: --  ABP(mean): --  RR: 41 (08 Dec 2022 04:00) (21 - 43)  SpO2: 98% (08 Dec 2022 05:45) (91% - 100%)    O2 Parameters below as of 07 Dec 2022 20:50  Patient On (Oxygen Delivery Method): ventilator             Mode: AC/ CMV (Assist Control/ Continuous Mandatory Ventilation)  RR (machine): 18  TV (machine): 350  FiO2: 100  PEEP: 5  ITime: 1  MAP: 19  PIP: 42      Input and Output:  I&O's Detail    06 Dec 2022 07:01  -  07 Dec 2022 07:00  --------------------------------------------------------  IN:    Enteral Tube Flush: 325 mL    Free Water: 90 mL    Glucerna 1.5: 650 mL  Total IN: 1065 mL    OUT:    Incontinent per Collection Bag (mL): 750 mL  Total OUT: 750 mL    Total NET: 315 mL      07 Dec 2022 07:01  -  08 Dec 2022 06:32  --------------------------------------------------------  IN:    dextrose 5%: 300 mL  Total IN: 300 mL    OUT:    Incontinent per Collection Bag (mL): 700 mL  Total OUT: 700 mL    Total NET: -400 mL          Lab Data                        7.9    7.10  )-----------( 214      ( 07 Dec 2022 06:00 )             27.4     12-07    139  |  102  |  20  ----------------------------<  122<H>  4.9   |  33<H>  |  0.76    Ca    8.7      07 Dec 2022 06:00  Phos  4.3     12-07  Mg     2.3     12-07    TPro  7.5  /  Alb  1.6<L>  /  TBili  0.5  /  DBili  x   /  AST  24  /  ALT  10  /  AlkPhos  206<H>  12-06            Review of Systems	      Objective     Physical Examination    heart s1s2  lung dec BS  head nc  trach      Pertinent Lab findings & Imaging      Annalise:  NO   Adequate UO     I&O's Detail    06 Dec 2022 07:01  -  07 Dec 2022 07:00  --------------------------------------------------------  IN:    Enteral Tube Flush: 325 mL    Free Water: 90 mL    Glucerna 1.5: 650 mL  Total IN: 1065 mL    OUT:    Incontinent per Collection Bag (mL): 750 mL  Total OUT: 750 mL    Total NET: 315 mL      07 Dec 2022 07:01  -  08 Dec 2022 06:32  --------------------------------------------------------  IN:    dextrose 5%: 300 mL  Total IN: 300 mL    OUT:    Incontinent per Collection Bag (mL): 700 mL  Total OUT: 700 mL    Total NET: -400 mL               Discussed with:     Cultures:	        Radiology

## 2022-12-08 NOTE — DISCHARGE NOTE FOR THE EXPIRED PATIENT - HOSPITAL COURSE
77F with dementia, COPD with chronic respiratory failure s/p trach, cardiac arrest, CHH, quadriplegia, CVA, CKD, anemia, PEG tube, and multiple hospital admissions for respiratory distress presents to the ED BIBEMS from [facility] for diaphoresis and fever. Admitted for VATs s/p treatment and remains oxygen dependent. No improvement despite treatment. GOC with family and finally made patient comfort measure only.     Called to bedside to evaluate the patient for pulselessness.     On physical exam, patient did not respond to verbal or noxious stimuli.  No spontaneous respirations.  Absent heart and breath sounds.  Absent radial and carotid pulses.   Pupils are fixed and dilated, no corneal reflex.  EKG rhythm strip shows asystole.   Patient pronounced dead at 1:32pm.  Family at bedside.  77F with dementia, COPD with chronic respiratory failure s/p trach, cardiac arrest, CHH, quadriplegia, CVA, CKD, anemia, PEG tube, and multiple hospital admissions for respiratory distress presents to the ED BIBEMS from [facility] for diaphoresis and fever. Admitted for VAPs s/p treatment and remains oxygen dependent. No improvement despite treatment. GOC with family and finally made patient comfort measure only.     Called to bedside to evaluate the patient for pulselessness.     On physical exam, patient did not respond to verbal or noxious stimuli.  No spontaneous respirations.  Absent heart and breath sounds.  Absent radial and carotid pulses.   Pupils are fixed and dilated, no corneal reflex.  EKG rhythm strip shows asystole.   Patient pronounced dead at 1:32pm.  Family at bedside.

## 2022-12-08 NOTE — PROGRESS NOTE ADULT - SUBJECTIVE AND OBJECTIVE BOX
INTERVAL HPI/OVERNIGHT EVENTS:  no new gi events    MEDICATIONS  (STANDING):  albuterol    90 MICROgram(s) HFA Inhaler 2 Puff(s) Inhalation two times a day  chlorhexidine 0.12% Liquid 15 milliLiter(s) Oral Mucosa every 12 hours  chlorhexidine 2% Cloths 1 Application(s) Topical daily  dextrose 5%. 1000 milliLiter(s) (50 mL/Hr) IV Continuous <Continuous>  dextrose 5%. 1000 milliLiter(s) (100 mL/Hr) IV Continuous <Continuous>  dextrose 5%. 1000 milliLiter(s) (50 mL/Hr) IV Continuous <Continuous>  dextrose 50% Injectable 25 Gram(s) IV Push once  dextrose 50% Injectable 12.5 Gram(s) IV Push once  dextrose 50% Injectable 25 Gram(s) IV Push once  glucagon  Injectable 1 milliGRAM(s) IntraMuscular once  heparin   Injectable 5000 Unit(s) SubCutaneous every 12 hours  insulin glargine Injectable (LANTUS) 8 Unit(s) SubCutaneous every morning  insulin lispro (ADMELOG) corrective regimen sliding scale   SubCutaneous every 6 hours  pantoprazole  Injectable 40 milliGRAM(s) IV Push daily  polyethylene glycol 3350 17 Gram(s) Oral every 12 hours  povidone iodine 10% Solution 1 Application(s) Topical daily  senna 2 Tablet(s) Oral at bedtime    MEDICATIONS  (PRN):  dextrose Oral Gel 15 Gram(s) Oral once PRN Blood Glucose LESS THAN 70 milliGRAM(s)/deciliter  fentaNYL    Injectable 50 MICROGram(s) IV Push every 6 hours PRN Tachypnea and Vent dyssynchrony  HYDROmorphone  Injectable 1 milliGRAM(s) IV Push every 30 minutes PRN dyspnea or pain.  midazolam Injectable 2 milliGRAM(s) IV Push every 2 hours PRN Vent synchrony  sodium chloride 0.9% lock flush 10 milliLiter(s) IV Push every 1 hour PRN Pre/post blood products, medications, blood draw, and to maintain line patency      Allergies    codeine (Hives)    Intolerances        Review of Systems:    General:  No wt loss, fevers, chills, night sweats,fatigue,   Eyes:  Good vision, no reported pain  ENT:  No sore throat, pain, runny nose, dysphagia  CV:  No pain, palpitatioins, hypo/hypertension  Resp:  No dyspnea, cough, tachypnea, wheezing  GI:  No pain, No nausea, No vomiting, No diarrhea, No constipatiion, No weight loss, No fever, No pruritis, No rectal bleeding, No tarry stools, No dysphagia,  :  No pain, bleeding, incontinence, nocturia  Muscle:  No pain, weakness  Neuro:  No weakness, tingling, memory problems  Psych:  No fatigue, insomnia, mood problems, depression  Endocrine:  No polyuria, polydypsia, cold/heat intolerance  Heme:  No petechiae, ecchymosis, easy bruisability  Skin:  No rash, tattoos, scars, edema      Vital Signs Last 24 Hrs  T(C): 36.2 (08 Dec 2022 07:56), Max: 36.8 (08 Dec 2022 06:00)  T(F): 97.1 (08 Dec 2022 07:56), Max: 98.3 (08 Dec 2022 06:00)  HR: 86 (08 Dec 2022 10:38) (86 - 104)  BP: 115/67 (08 Dec 2022 10:00) (115/67 - 167/84)  BP(mean): 82 (08 Dec 2022 10:00) (82 - 106)  RR: 18 (08 Dec 2022 10:00) (18 - 43)  SpO2: 100% (08 Dec 2022 10:38) (91% - 100%)    Parameters below as of 08 Dec 2022 08:00  Patient On (Oxygen Delivery Method): ventilator        PHYSICAL EXAM:    Constitutional: NAD, well-developed  HEENT: EOMI, throat clear  Neck: No LAD, supple  Respiratory: CTA and P  Cardiovascular: S1 and S2, RRR, no M  Gastrointestinal: BS+, soft, NT/ND, neg HSM,  Extremities: No peripheral edema, neg clubing, cyanosis  Vascular: 2+ peripheral pulses  Neurological: A/O x 3, no focal deficits  Psychiatric: Normal mood, normal affect  Skin: No rashes      LABS:                        8.3    8.09  )-----------( 242      ( 08 Dec 2022 06:56 )             28.0     12-08    138  |  100  |  18  ----------------------------<  152<H>  5.2   |  30  |  0.84    Ca    8.6      08 Dec 2022 06:56  Phos  5.0     12-08  Mg     2.3     12-08            RADIOLOGY & ADDITIONAL TESTS:

## 2022-12-08 NOTE — PROGRESS NOTE ADULT - REASON FOR ADMISSION
diaphoresis and fever

## 2022-12-19 NOTE — ED ADULT NURSE NOTE - CHIEF COMPLAINT QUOTE
fair minus Pt BIBA from [facility] in respiratory distress -pt with trach in place -

## 2023-01-03 NOTE — DIETITIAN INITIAL EVALUATION ADULT. - 30 CAL TO
[de-identified] : 27-year-old female with a history of severe obesity status post laparoscopic sleeve gastrectomy in 2012, conversion to Brittany-en-Y gastric bypass for severe GERD in 2019, and a laparoscopy with lysis of adhesions for small bowel obstruction/internal hernia in 2020.  Patient had presented to me with abdominal pain which had acutely worsened.  She was taken to the OR for diagnostic laparoscopy and lysis of adhesions with mobilization of the splenic flexure for a partial large bowel obstruction.  She also was readmitted for epigastric pain, underwent endoscopy with biopsies and was found to have H. pylori.  She is now status post treatment for H. pylori with Dr. Link.\par \par Patient reports that her abdominal pain has now greatly improved.  She has occasional pain last 1 week ago, which generally improves with food.  She experiences the worst pain in the morning and evening.  Taking Protonix and Carafate also helps improve the pain.  Patient has also been switched to Linzess by Dr. Link and thinks that has also been helping her symptoms.  She is not having daily bowel movements, however she is not experiencing constipation and does not require straining for bowel movements.  Overall the frequency of her pain and severity have greatly improved.  She is eager to return to work and feels well enough.  She reports that she is able to eat better but still eats small portions given her bypass.  She has gained some weight recently, as she was unable to eat prior to my operative intervention and treatment of H. pylori.  Patient still remains within a healthy BMI category. [id number]

## 2023-01-19 NOTE — ED ADULT NURSE NOTE - PAIN: PRESENCE, MLM
Refill request received for Drospirenone-ethinyl estradiol    Last office visit: 10/19/2022  Next office visit: Visit date not found  Last refill: 12/06/2021 # 84 + 4 refills  Last labs: 11/02/22      Oral Contraceptives Refill Protocol - 12 Month Protocol Passed 01/18/2023 09:30 PM   Protocol Details  Seen by prescribing provider or same department within the last 12 months or has a future appt in 3 months - IF FAILED PLEASE LOOK AT CHART REVIEW FOR LAST VISIT AND PROCEED ACCORDINGLY    Patient is not pregnant    Patient is 35 yrs or older and is not a smoker    Medication (including dose and sig) on current meds list        non-verbal indicators absent

## 2023-02-01 NOTE — PROGRESS NOTE ADULT - SUBJECTIVE AND OBJECTIVE BOX
Detail Level: Detailed Patient is a 76y Female with a known history of :    HPI:  ***Patient with dementia and is non-verbal and is chronically trach/vented.  Patient is from facility and therefore information is obtained from chart.***    76F with dementia, COPD with chronic resp failure and is vent dependent, s/p PEG, hx of cardiac arrest, CHF, cor pulmonale, CVA, COVID-19 infxn, CKD, anemia, multiple admissions for respiratory distress (last admission from 6/1-6/9 diagnosed with PNA with parapneumonic pleural effusion s/p thoracentesis and Avycaz) who presents from [facility] for respiratory distress again.  [facility] transfer note only mention respiratory distress.  As per ED note, patient was noted to have increased dyspnea and worsening hypoxia.  No noted pain or reported fevers/chills.  In the ED, patient's triage vitals were /72  , RR 24, 100% on vent, T 99.7F.  Labs showed leukocytosis 25.06 (up from 6.85 on 6/10), anemia 9.8 (from 11.3), thrombocytosis 466 (from 264), hyperkalemia 5.7, lactic acid 3.7.  ABG was 7.48/34/168.  CT chest pending.  CXR showed possible consolidation on right lobe.  Patient started empirically on vancomycin and zosyn by ED.  Patient is being admitted for respiratory distress 2/2 PNA.  Currently patient is non-verbal and does not seem to be in any distress.   (18 Aug 2022 20:18)      REVIEW OF SYSTEMS:    CONSTITUTIONAL: No fever, weight loss, or fatigue  EYES: No eye pain, visual disturbances, or discharge  ENMT:  No difficulty hearing, tinnitus, vertigo; No sinus or throat pain  NECK: No pain or stiffness  BREASTS: No pain, masses, or nipple discharge  RESPIRATORY: No cough, wheezing, chills or hemoptysis; No shortness of breath  CARDIOVASCULAR: No chest pain, palpitations, dizziness, or leg swelling  GASTROINTESTINAL: No abdominal or epigastric pain. No nausea, vomiting, or hematemesis; No diarrhea or constipation. No melena or hematochezia.  GENITOURINARY: No dysuria, frequency, hematuria, or incontinence  NEUROLOGICAL: No headaches, memory loss, loss of strength, numbness, or tremors  SKIN: No itching, burning, rashes, or lesions   LYMPH NODES: No enlarged glands  ENDOCRINE: No heat or cold intolerance; No hair loss  MUSCULOSKELETAL: No joint pain or swelling; No muscle, back, or extremity pain  PSYCHIATRIC: No depression, anxiety, mood swings, or difficulty sleeping  HEME/LYMPH: No easy bruising, or bleeding gums  ALLERGY AND IMMUNOLOGIC: No hives or eczema    MEDICATIONS  (STANDING):  albuterol/ipratropium for Nebulization 3 milliLiter(s) Nebulizer every 6 hours  chlorhexidine 0.12% Liquid 15 milliLiter(s) Oral Mucosa every 12 hours  chlorhexidine 2% Cloths 1 Application(s) Topical daily  collagenase Ointment 1 Application(s) Topical daily  dextrose 5%. 1000 milliLiter(s) (100 mL/Hr) IV Continuous <Continuous>  dextrose 5%. 1000 milliLiter(s) (50 mL/Hr) IV Continuous <Continuous>  dextrose 50% Injectable 25 Gram(s) IV Push once  dextrose 50% Injectable 12.5 Gram(s) IV Push once  dextrose 50% Injectable 25 Gram(s) IV Push once  folic acid 1 milliGRAM(s) Oral daily  glucagon  Injectable 1 milliGRAM(s) IntraMuscular once  heparin   Injectable 5000 Unit(s) SubCutaneous every 12 hours  insulin glargine Injectable (LANTUS) 8 Unit(s) SubCutaneous every morning  insulin lispro (ADMELOG) corrective regimen sliding scale   SubCutaneous every 6 hours  lactobacillus acidophilus 1 Tablet(s) Oral daily  LORazepam     Tablet 2 milliGRAM(s) Oral every 4 hours  methocarbamol 500 milliGRAM(s) Oral two times a day  multivitamin 1 Tablet(s) Oral daily  nystatin Powder 1 Application(s) Topical three times a day  pantoprazole  Injectable 40 milliGRAM(s) IV Push daily  piperacillin/tazobactam IVPB.. 3.375 Gram(s) IV Intermittent every 8 hours  polyethylene glycol 3350 17 Gram(s) Oral every 12 hours  povidone iodine 10% Solution 1 Application(s) Topical daily  senna 3 Tablet(s) Oral at bedtime  simethicone 80 milliGRAM(s) Chew every 6 hours  sodium chloride 7% Inhalation 4 milliLiter(s) Inhalation every 12 hours  trimethoprim  160 mG/sulfamethoxazole 800 mG 2 Tablet(s) Oral two times a day    MEDICATIONS  (PRN):  acetaminophen     Tablet .. 650 milliGRAM(s) Oral every 6 hours PRN Temp greater or equal to 38C (100.4F), Mild Pain (1 - 3)  aluminum hydroxide/magnesium hydroxide/simethicone Suspension 30 milliLiter(s) Oral every 4 hours PRN Dyspepsia  dextrose Oral Gel 15 Gram(s) Oral once PRN Blood Glucose LESS THAN 70 milliGRAM(s)/deciliter  fentaNYL    Injectable 25 MICROGram(s) IV Push every 4 hours PRN Moderate Pain (4 - 6)  melatonin 3 milliGRAM(s) Oral at bedtime PRN Insomnia  ondansetron Injectable 4 milliGRAM(s) IV Push every 8 hours PRN Nausea and/or Vomiting  sodium chloride 0.9% lock flush 10 milliLiter(s) IV Push every 1 hour PRN Pre/post blood products, medications, blood draw, and to maintain line patency      ALLERGIES: codeine (Hives)      FAMILY HISTORY:  No pertinent family history in first degree relatives        Social history:  Alochol:   Smoking:   Drug Use:   Marital Status:     PHYSICAL EXAMINATION:  -----------------------------  T(C): 36.1 (08-22-22 @ 08:23), Max: 37.1 (08-21-22 @ 12:27)  HR: 65 (08-22-22 @ 07:00) (64 - 86)  BP: 98/56 (08-22-22 @ 07:00) (87/49 - 160/78)  RR: 14 (08-22-22 @ 07:00) (14 - 36)  SpO2: 99% (08-22-22 @ 07:00) (92% - 100%)  Wt(kg): --    08-21 @ 07:01  -  08-22 @ 07:00  --------------------------------------------------------  IN:    Enteral Tube Flush: 100 mL    Free Water: 400 mL    IV PiggyBack: 100 mL    Jevity 1.5: 1035 mL  Total IN: 1635 mL    OUT:    Indwelling Catheter - Urethral (mL): 300 mL    Voided (mL): 150 mL  Total OUT: 450 mL    Total NET: 1185 mL            Constitutional: well developed, normal appearance, well groomed, well nourished, no deformities and no acute distress.   Eyes: the conjunctiva exhibited no abnormalities and the eyelids demonstrated no xanthelasmas.   HEENT: normal oral mucosa, no oral pallor and no oral cyanosis.   Neck: normal jugular venous A waves present, normal jugular venous V waves present and no jugular venous obregon A waves.   Pulmonary: no respiratory distress, normal respiratory rhythm and effort, no accessory muscle use and lungs were clear to auscultation bilaterally. Anteriorly  Cardiovascular: heart rate and rhythm were normal, normal S1 and S2 and no murmur, gallop, rub, heave or thrill are present.   Musculoskeletal: the gait could not be assessed.   Extremities: no clubbing of the fingernails, no localized cyanosis, no petechial hemorrhages and no ischemic changes.   Skin: normal skin color and pigmentation, no rash, no venous stasis, no skin lesions, no skin ulcer and no xanthoma was observed.        LABS:   --------  08-22    135  |  102  |  37<H>  ----------------------------<  143<H>  5.2   |  30  |  1.33<H>    Ca    8.3<L>      22 Aug 2022 06:35    TPro  6.4  /  Alb  1.7<L>  /  TBili  0.2  /  DBili  x   /  AST  13  /  ALT  12  /  AlkPhos  105  08-22                         7.5    7.53  )-----------( 285      ( 22 Aug 2022 06:35 )             24.5     PT/INR - ( 22 Aug 2022 06:35 )   PT: 14.0 sec;   INR: 1.21 ratio           08-22 @ 06:35 BNP: 3505 pg/mL              Radiology:

## 2023-04-17 NOTE — ED ADULT NURSE NOTE - PERIPHERAL PULSES
Detail Level: Detailed
General Sunscreen Counseling: I recommended a broad spectrum sunscreen with an SPF of 30 or higher.  I explained that SPF 30 sunscreens block approximately 97 percent of the sun's harmful rays.  Sunscreens should be applied at least 15 minutes prior to expected sun exposure and then every 2 hours after that as long as sun exposure continues. If swimming or exercising sunscreen should be reapplied every 45 minutes to an hour after getting wet or sweating.  One ounce, or the equivalent of a shot glass full of sunscreen, is adequate to protect the skin not covered by a bathing suit. I also recommended a lip balm with an SPF 15 or higher sunscreen as well. Sun protective clothing can be used in lieu of sunscreen but must be worn the entire time you are exposed to the sun's rays.
weak bilaterally

## 2023-04-19 NOTE — PROGRESS NOTE ADULT - MENTAL STATUS
Addended by: CHELLE BACON on: 4/18/2023 08:18 PM     Modules accepted: Orders    
Unable to perform due to underlying medical condition.
disoriented.  unable to examine due to underlying medical condition.

## 2023-06-11 NOTE — DIETITIAN INITIAL EVALUATION ADULT - PHYSCIAL ASSESSMENT
Bed: 13  Expected date: 6/11/23  Expected time: 3:27 PM  Means of arrival: [facility]  Comments:  41yo F ETOH in custody   118/74 88 16 98    well nourished

## 2023-08-18 NOTE — ED PROVIDER NOTE - COVID-19 RESULT
NEGATIVE Methotrexate Pregnancy And Lactation Text: This medication is Pregnancy Category X and is known to cause fetal harm. This medication is excreted in breast milk.

## 2023-08-27 NOTE — PROGRESS NOTE ADULT - ASSESSMENT
REVIEW OF SYMPTOMS      Able to give (reliable) ROS  NO     PHYSICAL EXAM    HEENT Unremarkable  atraumatic   RESP Fair air entry EXP prolonged    Harsh breath sound Resp distres mild   CARDIAC S1 S2 No S3     NO JVD    ABDOMEN SOFT BS PRESENT NOT DISTENDED No hepatosplenomegaly   PEDAL EDEMA present No calf tenderness  NO rash       GENERAL DATA .   GOC.  full code      ALLGY.     codeine                        WT. 11/21/2022 57  BMI.    11/21/2022 21                          ICU STAY.   admitted ICU 11/21/2022  COVID.  Scv2 11/21/2022 scv2 (-)    PROCEDURE.  11/21/2022 [facility] central line    BEST PRACTICE ISSUES.    HOB ELEVATN. Yes  DVT PPLX.  11/21/2022 hpsc    SQUIRES PPLX.    11/21/2022 protonix 40   INFN PPLX.    11/21/2022 chlorhexidine .12% bid (mrsa)   11/21/2022 chlorhexidine 2% (c li)   SP SW EM.         DIET.  11/21/2022 glucerna 1.5 1000 gt               VS/ PO/IO/ VENT/ DRIPS.  11/26/2022 afeb 50 120/50   11/26/2022 5p nore .05 m/k/m  11/26/2022 ac 18/350/8/100     CURRENT ADMISSION  Pt sent back from [facility] 11/21/2022 with fever abn labs Found yto be in shock Norepi started 11/21/2022   Pulm crit care consulted      RECENT ADMISSIONS.  11/12-11/16/2022 11/4-11/10/2022  11/5 stenotrophomonas  uc 11/4 vr enterococc 50-99 candida   11/4/2022 levaquin 750 dced 11/7 doxy  11/8 bactrim 250.3  10/18-11/2/2022 11/21 ADMISSION PROBLEMS.  Vent dependent   .. Trach poa 11/21/2022  .. ac 18/350/8/100   RIJ 11/21/2022   INFECTN   .. Decub ulcers   .. VAP 11/21/2022  ...... trach 11/21 mod acinetobacter carbapenem resist   ...... Bactrim tid  11/21-11/25/2022  ...... 11/25/2022 Unasyn 3.4 x 7d Dr Chairez    COPD  Shock   .. Norepi 11/21/2022-> 11/21-11/25  .. midodrine 11/21/2022   peg poa 11/21/2022  ANEMIA   .. Hb 11/25/2022 Hb 7.5  Hyponatremia  .. Na 11/24-11/26/2022 Na 130 - 135  Hyperkalemia .  .. k 11/26/2022 k 5.7       ASSESSMENT/RECOMMENDATIONS .   RESP.  .. Gas exchange.  11/21/2022 4a On 100% vent 759/36/273   Monitor & target po 90-95%  .. VENT MANAGEMENT.   HOB elevation  Target Pplat 30 (-)  Target PO 90-95%  Target pH 730 (+)  Daily spontaneous breathing trials   Daily sedation vacation   .. Pleural effsn .   CXR 11/21/2022 r gr than l effsn  Effusion has been tapped during prev admissions and is lp exudate If fever or worsening sepsis will plan thorac  RO VTE.  11/26/2022 check cta ch   .. Bronchospasm.  11/21/2022 albut hfa 2p bid   INFECTION.  .. SCV2 status.  Scv2 11/21/2022 scv2 (-)  .. VAP   w 11/21-11/23-11/24-11/25-11/26/2022 w 16 - 8.8 - 12- 9 - 9.4   Pr 11/21-11/22/2022 pr 1.8 - 1.1  ua 11/21/2022 w 3-5   cxr 11/21 mod to large r effsn somewhat incr from 11/12 central infiltrates possibly congestive rij   rvp 11/21/2022 (-)   rsv 11/21/2022 (-)   uc 11/21 (-)   legn 11/21 (-)   trach 11/21 mod acinetobacter carbapenem resist   mrsa 11/21 (-)   bc 11/21 (-)   11/21/2022 bactrim 285 mg trimeth q 8 h Dr BRITTANY Brantley DCED 11/25/2022 11/25/2022 Unasyn 3.4 x 7d Dr Chairez    CARDIAC.  .. Shock.    11/21/2022 midodrine 10.3 -> 11/22 midodr 15.3   11/21/2022 5a norepi 8 in 250   target map 65 (+)   .. ekg changes.  ekg 11/21/2022 s tach shoirt pr qtc 470 possible rvh   tr 11/22/2022 tr 28   GI.  .. Nutrition.   11/21/2022 glucerna 1.5 1000 gt    HEMAT.  .. DVT pplx.   11/21/2022 hpsc    .. anemia.  Hb 11/21-11/22-11/23-11/24-11/25-11/26/2022   Hb 9.6- 7.8 - 7.7 - 7.7- 7.5 - 7.5    mcv 11/21/2022 mcv 90   inr 11/21/2022 inr 122   monitor target Hb 7 (+)   RENAL.  .. Hyperkalemia .  k 11/26/2022 k 5.7   11/26/2022 lokelma dose   ENDOCRINE.   .. DM   11/21/2022 glarg 8 q am   11/21/2022 riss   NEURO.  .. seizures.  11/21/2022 lorazepam 1.6     TIME SPENT   Over 39 minutes aggregate critical care time spent on encounter; activities included   direct patient care, counseling and/or coordinating care reviewing notes, lab data/ imaging , discussion with multidisciplinary team/ patient  /family and explaining in detail risks, benefits, alternatives  of the recommendations     CHAPINCITO GUIDRY 76 f [facility] S 11/21/2022   DR JOSEPH DU     72.6

## 2024-01-10 NOTE — PROCEDURE NOTE - NSPROCNAME_GEN_A_CORE
Arterial Puncture/Cannulation
Central Line Insertion
[FreeTextEntry1] : Pt encounter incorporated clinical review of the medical record including consultation from specialists, hospital and UC notes, review of lab and diagnostic testing with interpretation and discussion of results with patient, general pt counseling, and coordination of care, as well as documentation update within the electronic medical record.  Time spent: 60 mins.

## 2024-01-11 NOTE — ED ADULT NURSE NOTE - CAS EDP DISCH DISPOSITION ADMI
RN confirmed tizanidine  Sent in case needed    Blanca Rust RN     AS    1/10/24  3:23 PM  Note      Spoke with pt. She did not request medication. It was most likely sent my pharmacy. Pt is still taking medication as her shoulder is still bothering her. She still has enough medication. Message routed to PCP.        
ICU

## 2024-01-29 NOTE — ED ADULT NURSE NOTE - BOWEL SOUNDS LUQ
Patient is requesting a pharmacy change.    [facility]   Tel [phone]    Pt wants to know if its going to be 30 tablets that will be dispensed. Pt states that last time he had Rx filled, he only received 10 tablets.     Pt would prefer 30 tablets.     Call back [phone]  
present

## 2024-04-04 NOTE — ED ADULT NURSE NOTE - NSICDXPASTMEDICALHX_GEN_ALL_CORE_FT
Detail Level: Zone
PAST MEDICAL HISTORY:  Advanced dementia     Aphasic stroke     Constipation     COVID-19 virus detected     CVA (cerebral vascular accident)     Dementia of frontal lobe type     Diabetes mellitus     DM (diabetes mellitus)     GERD (gastroesophageal reflux disease)     HTN (hypertension)     Hypertension     Pneumonia     Quadriplegia     Respiratory failure     Respiratory failure     Type II diabetes mellitus

## 2024-04-28 NOTE — PROGRESS NOTE ADULT - SUBJECTIVE AND OBJECTIVE BOX
Chief Complaint: Respiratory distress    Interval Events: No events overnight.    Review of Systems:  Unable to obtain    Physical Exam:  Vital Signs Last 24 Hrs  T(C): 37.2 (02 Dec 2022 08:58), Max: 37.2 (02 Dec 2022 08:58)  T(F): 98.9 (02 Dec 2022 08:58), Max: 98.9 (02 Dec 2022 08:58)  HR: 77 (02 Dec 2022 06:44) (54 - 104)  BP: 154/81 (02 Dec 2022 06:15) (103/55 - 154/81)  BP(mean): 104 (02 Dec 2022 06:15) (71 - 104)  RR: 28 (02 Dec 2022 06:15) (17 - 40)  SpO2: 100% (02 Dec 2022 06:44) (93% - 100%)  Parameters below as of 02 Dec 2022 06:44  Patient On (Oxygen Delivery Method): ventilator,100  General: Unresponsive  HEENT: MMM  Neck: No JVD, no carotid bruit  Lungs: CTAB  CV: RRR, nl S1/S2, no M/R/G  Abdomen: S/NT/ND, +BS  Extremities: 2+ LE edema, no cyanosis  Neuro: AAOx0  Skin: No rash    Labs:    12-02    140  |  102  |  24<H>  ----------------------------<  169<H>  4.2   |  31  |  1.08    Ca    8.4      02 Dec 2022 07:25    TPro  7.6  /  Alb  1.8<L>  /  TBili  0.4  /  DBili  x   /  AST  20  /  ALT  <10<L>  /  AlkPhos  168<H>  12-02                        9.1    8.74  )-----------( 214      ( 02 Dec 2022 07:25 )             30.7       ECG/Telemetry: Sinus rhythm Rounding complete, pt brought to bathroom using wheelchair, brought to CT scan. Pt awaiting CT results. Pt aware of plan of care. Head of bed adjusted for pt comfort.

## 2024-05-01 NOTE — PATIENT PROFILE ADULT - NSPROPTRIGHTCAREGIVER_GEN_A_NUR
How Severe Is Your Skin Lesion?: mild Have Your Skin Lesions Been Treated?: not been treated Is This A New Presentation, Or A Follow-Up?: Skin Lesions information could not be obtained

## 2024-05-13 NOTE — PROGRESS NOTE ADULT - PROVIDER SPECIALTY LIST ADULT
Critical Care TRANSITIONAL CARE MANAGEMENT - HOSPITAL DISCHARGE FOLLOW-UP    Contacted Ms. John regarding follow-up for hyperkalemia after hospital discharge. She was discharged from the hospital on 5/12/24. Review of the After Visit Summary from the recent hospitalization indicates that the patient needs to follow up with PCP.    She feels that she is doing fairly well at home.   Her diet concern is none. Overall, the patient is doing her tube feedings as directed at discharge. She has not experienced any nausea or vomiting since being home from the hospital.   Ambulation: staying the same. Patient reports that she is weak and has been using a walker to get around the house. She reports that she does have physical therapy coming today to evaluate her as well   Fever: is not present  Pain: none  Activities of Daily Living (global): Partial assistance   Patient states that she does have sufficient family support. She feels that she is able to ask for assistance when needed.     Additional patient/family concerns: None .    Discharge medications were Verified with the patient. She is fully compliant with the medication regimen prescribed at the time of discharge. She reports that she is not experiencing any medication side effects.    Upon discharge, the patient was to receive home healthcare services  and physical therapy. These services have been initiated.     Advance care planning documents on file - yes    Patient has an appointment on 5/15/24 with Wanda Agarwal DO. Ms. John was reminded about the importance of keeping this appointment.

## 2024-06-01 NOTE — PROGRESS NOTE ADULT - SUBJECTIVE AND OBJECTIVE BOX
Covering [facility] DIVISION of INFECTIOUS DISEASE  MOIZ Chun, SOLANGE Higgins G. Casimir PARASHARAMIBREA is a 77yFemale , patient examined and chart reviewed.     INTERVAL HPI/ OVERNIGHT EVENTS:  Vegetative state. Afebrile.  Chronic vent. No events  On pressors. % fi02    Past Medical History--  PAST MEDICAL & SURGICAL HISTORY:  Dementia of frontal lobe type  Aphasic stroke  Diabetes mellitus  Respiratory failure  Hypertension  GERD (gastroesophageal reflux disease)  Constipation  Respiratory failure  CVA (cerebral vascular accident)  HTN (hypertension)  Advanced dementia  COVID-19 virus detected  Quadriplegia  Pneumoni  Type II diabetes mellitus  Hx of appendectomy  Gastrostomy in place  Tracheostomy in place        For details regarding the patient's social history, family history, and other miscellaneous elements, please refer the initial infectious diseases consultation and/or the admitting history and physical examination for this admission.      ROS:  Unable to obtain due to : pt's condition      ALLERGIES  codeine (Hives)      Current inpatient medications :    ANTIBIOTICS/RELEVANT:  ampicillin/sulbactam  IVPB 3 Gram(s) IV Intermittent every 6 hours    MEDICATIONS  (STANDING):  albuterol    90 MICROgram(s) HFA Inhaler 2 Puff(s) Inhalation two times a day  chlorhexidine 2% Cloths 1 Application(s) Topical daily  dexMEDEtomidine Infusion 0.5 MICROgram(s)/kG/Hr (7.14 mL/Hr) IV Continuous <Continuous>  dextrose 5%. 1000 milliLiter(s) (100 mL/Hr) IV Continuous <Continuous>  dextrose 5%. 1000 milliLiter(s) (50 mL/Hr) IV Continuous <Continuous>  dextrose 50% Injectable 25 Gram(s) IV Push once  dextrose 50% Injectable 12.5 Gram(s) IV Push once  dextrose 50% Injectable 25 Gram(s) IV Push once  glucagon  Injectable 1 milliGRAM(s) IntraMuscular once  heparin   Injectable 5000 Unit(s) SubCutaneous every 12 hours  insulin glargine Injectable (LANTUS) 8 Unit(s) SubCutaneous every morning  insulin lispro (ADMELOG) corrective regimen sliding scale   SubCutaneous every 6 hours  LORazepam     Tablet 1 milliGRAM(s) Oral every 4 hours  midodrine 15 milliGRAM(s) Oral every 8 hours  norepinephrine Infusion 0.05 MICROgram(s)/kG/Min (5.35 mL/Hr) IV Continuous <Continuous>  pantoprazole  Injectable 40 milliGRAM(s) IV Push daily  polyethylene glycol 3350 17 Gram(s) Oral every 12 hours  povidone iodine 10% Solution 1 Application(s) Topical daily  senna 2 Tablet(s) Oral at bedtime  sodium chloride 0.9%. 1000 milliLiter(s) (100 mL/Hr) IV Continuous <Continuous>  sodium zirconium cyclosilicate 10 Gram(s) Oral three times a day    MEDICATIONS  (PRN):  dextrose Oral Gel 15 Gram(s) Oral once PRN Blood Glucose LESS THAN 70 milliGRAM(s)/deciliter  LORazepam   Injectable 2 milliGRAM(s) IV Push every 6 hours PRN Agitation  sodium chloride 0.9% lock flush 10 milliLiter(s) IV Push every 1 hour PRN Pre/post blood products, medications, blood draw, and to maintain line patency    Objective:  Vital Signs Last 24 Hrs  T(C): 36.6 (27 Nov 2022 11:49), Max: 37.3 (27 Nov 2022 00:00)  T(F): 97.8 (27 Nov 2022 11:49), Max: 99.2 (27 Nov 2022 00:00)  HR: 99 (27 Nov 2022 16:00) (49 - 123)  BP: 98/52 (27 Nov 2022 16:00) (85/43 - 198/151)  BP(mean): 66 (27 Nov 2022 16:00) (56 - 173)  RR: 36 (27 Nov 2022 16:00) (13 - 48)  SpO2: 100% (27 Nov 2022 16:00) (79% - 100%)    Parameters below as of 27 Nov 2022 08:00  Patient On (Oxygen Delivery Method): ventilator    O2 Concentration (%): 100        Physical Exam:  GEN: Chronic vent vegetative state  HEENT: normocephalic and atraumatic.   NECK: Supple.   LUNGS: Decreased  to auscultation.  HEART: Regular rate and rhythm without murmur.  ABDOMEN: Soft, nontender, and nondistended.  Positive bowel sounds.  +G tube  EXTREMITIES: + edema.  NEUROLOGIC: Unresponsive      LABS:                        7.2    9.13  )-----------( 176      ( 27 Nov 2022 06:35 )             24.3   11-27    137  |  102  |  28<H>  ----------------------------<  117<H>  6.0<H>   |  31  |  1.17    Ca    8.0<L>      27 Nov 2022 06:35  Phos  2.9     11-26    TPro  6.4  /  Alb  1.7<L>  /  TBili  0.3  /  DBili  x   /  AST  23  /  ALT  15  /  AlkPhos  138<H>  11-27      MICROBIOLOGY:  Culture - Sputum . (11.21.22 @ 20:14)    Gram Stain:   Moderate polymorphonuclear leukocytes per low power field  No Squamous epithelial cells per low power field  No organisms seen    -  Amikacin: S <=16    -  Ampicillin/Sulbactam: S 8/4    -  Cefepime: R >16    -  Ceftazidime: R >16    -  Ciprofloxacin: R >2    -  Gentamicin: I 8    -  Imipenem: R 8    -  Levofloxacin: R >4    -  Meropenem: R >8    -  Piperacillin/Tazobactam: R    -  Polymyxin B: I 0.25    -  Tobramycin: R >8    -  Trimethoprim/Sulfamethoxazole: R >2/38    -  Minocycline: R    Specimen Source: Trach Asp Tracheal Aspirate    Culture Results:   Moderate Acinetobacter baumannii/nosocom group (Carbapenem Resistant)  Normal Respiratory Elvira present    Organism Identification: Acinetobacter baumannii/nosocom group (Carbapenem Resistant)    Organism: Acinetobacter baumannii/nosocom group (Carbapenem Resistant)    Organism: Acinetobacter baumannii/nosocom group (Carbapenem Resistant)    Organism: Acinetobacter baumannii/nosocom group (Carbapenem Resistant)    Method Type: ETEST    Method Type: KB    Method Type: PRATIK          RADIOLOGY & ADDITIONAL STUDIES:    ACC: [id number] EXAM:  XR CHEST PORTABLE URGENT 1V                        ACC: [id number] EXAM:  XR CHEST PORTABLE URGENT 1V                          PROCEDURE DATE:  11/21/2022          INTERPRETATION:  AP chest on November 21, 2022 at 3:52 AM. Patient has   sepsis.    Heart magnified by technique. Tracheostomy remains.    Moderate to large right effusion somewhat increased from November 12.    Central infiltrates are again noted possibly congestive.    Follow-up AP chest on November 21, 2022 at 1:03 PM.    Right jugular line is been inserted. Congestion somewhat improved   effusions diminished.    Patient's hand obscures left base.    IMPRESSION: Initial CHF and right effusion improved on the latest study.   Right jugular line inserted.      Assessment :  77F with dementia, COPD with chronic respiratory failure s/p trach, cardiac arrest, CHH, quadriplegia, CVA, CKD, anemia, PEG tube, and multiple hospital admissions for respiratory distress presents to the ED [facility]IBEMS from [facility] for diaphoresis and fever.     Readmitted with sepsis with shock  2/2 recurent VAP.    Sputum cx 11/21 with Acinetobacter baumannii/nosocom group (Carbapenem Resistant) resistant to Bactrim  WBC better  Remains on pressors and on 100% fi02    Plan:  Cont Unasyn x 7 days  Trend temps and cbc  Asp precautions  Aggressive pulm toileting  Isolation per infection control policy  Vent management per ICU  Wean off pressors as BP permits  Poor prognosis  Continued GOC      Continue with present regiment.  Appropriate use of antibiotics and adverse effects reviewed.      Critical care time greater then 35 minutes reviewing notes, labs data/ imaging , discussion with multidisciplinary team.    Thank you for allowing me to participate in care of your patient .        Eusebio Chairez MD  Infectious Disease  [phone] yes

## 2024-07-01 NOTE — CONSULT NOTE ADULT - SUBJECTIVE AND OBJECTIVE BOX
REASON FOR CONSULT: RYAN    CONSULT REQUESTED BY: Dr Brown      HPI 75F with chronic resp failure - vent dependent, hx of subarachnoid hemorrhage, hx of cardiac arrest, dementia s/p PEG, HTN, DM2 on insulin, hx of CVA, GERD, COPD, [facility]/sepsis/aspiration PNA from SNF sent here for abnormal labs. Pt is nonverbal. History obtained from chart. In ED, na 119, k6.1, Bun 259, Cr 4.47, Trop 109.8. Critical care called for further management.         PAST MEDICAL & SURGICAL HISTORY:  Dementia of frontal lobe type    Aphasic stroke    Diabetes mellitus    Respiratory failure    Hypertension    GERD (gastroesophageal reflux disease)    Constipation    Respiratory failure    CVA (cerebral vascular accident)    HTN (hypertension)    DM (diabetes mellitus)    Advanced dementia    COVID-19 virus detected    Quadriplegia    Pneumonia    Type II diabetes mellitus    Hx of appendectomy    Gastrostomy in place    Tracheostomy in place    Tracheostomy tube present    Feeding by G-tube      Allergies    codeine (Hives)    Intolerances      FAMILY HISTORY:  No pertinent family history in first degree relatives        Review of Systems: UTO. pt nonverbal     Medications:  LORazepam 1 mg oral tablet: 1 tab(s) orally every 4 hours  · 	methocarbamol 500 mg oral tablet: 1 tab(s) orally 2 times a day  · 	insulin lispro 100 units/mL injectable solution: injectable 3 times a day , sliding scale coverage   · 	chlorhexidine 0.12% mucous membrane liquid: 15 milliliter(s) mucous membrane 2 times a day  · 	simethicone 80 mg oral tablet, chewable: 1 tab(s) orally every 6 hours  · 	pantoprazole 40 mg oral granule, delayed release: 40 milligram(s) by gastrostomy tube 2 times a day  · 	senna 8.6 mg oral tablet: 3 tab(s) by gastrostomy tube once a day (at bedtime)  · 	polyethylene glycol 3350 oral powder for reconstitution: 17 gram(s) orally every 12 hours  · 	albuterol 90 mcg/inh inhalation aerosol: 2 puff(s) inhaled every 6 hours  · 	Carafate 1 g/10 mL oral suspension: 10 milliliter(s) by gastrostomy tube 4 times a day (before meals and at bedtime) for 14 days (Started 21)  · 	ipratropium-albuterol 0.5 mg-2.5 mg/3 mL inhalation solution: 3 milliliter(s) inhaled 4 times a day  · 	Bacid (LAC) oral tablet: 2 tab(s) by gastrostomy tube once a day  · 	Tylenol 325 mg oral tablet: 2 tab(s) by gastrostomy tube once a day, 60 minutes prior to dressing change   · 	ProAir HFA 90 mcg/inh inhalation aerosol: 2 puff(s) inhaled every 6 hours                  dextrose 50% Injectable 50 milliLiter(s) IV Push once  insulin regular  human recombinant. 10 Unit(s) SubCutaneous once    sodium chloride 0.9%. 1000 milliLiter(s) IV Continuous <Continuous>        sodium polystyrene sulfonate Enema 45 Gram(s) Rectal Once      Mode: AC/ CMV (Assist Control/ Continuous Mandatory Ventilation)  RR (machine): 18  TV (machine): 400  FiO2: 50  PEEP: 5  ITime: 1  MAP: 12  PIP: 32      ICU Vital Signs Last 24 Hrs  T(C): --  T(F): --  HR: 74 (2022 21:09) (74 - 80)  BP: 106/81 (2022 20:22) (106/81 - 106/81)  BP(mean): --  ABP: --  ABP(mean): --  RR: 18 (2022 20:22) (18 - 18)  SpO2: 100% (2022 21:09) (99% - 100%)    Vital Signs Last 24 Hrs  T(C): --  T(F): --  HR: 74 (2022 21:09) (74 - 80)  BP: 106/81 (2022 20:22) (106/81 - 106/81)  BP(mean): --  RR: 18 (2022 20:22) (18 - 18)  SpO2: 100% (2022 21:09) (99% - 100%)        I&O's Detail        LABS:        119<LL>  |  82<L>  |  259<H>  ----------------------------<  211<H>  6.1<H>   |  27  |  4.47<H>    Ca    8.8      2022 21:21    TPro  7.3  /  Alb  1.9<L>  /  TBili  0.4  /  DBili  x   /  AST  362<H>  /  ALT  104<H>  /  AlkPhos  140<H>            CAPILLARY BLOOD GLUCOSE          Urinalysis Basic - ( 2022 23:14 )    Color: Yellow / Appearance: Clear / S.005 / pH: x  Gluc: x / Ketone: Negative  / Bili: Negative / Urobili: Negative mg/dL   Blood: x / Protein: 30 mg/dL / Nitrite: Negative   Leuk Esterase: Moderate / RBC: x / WBC x   Sq Epi: x / Non Sq Epi: x / Bacteria: x      CULTURES:      Physical Examination:    General: on vent. nonverbal. NAD    HEENT: Pupils equal, reactive to light.  Symmetric.    PULM: Clear to auscultation bilaterally    CVS: Regular rate and rhythm, no murmurs, rubs, or gallops    ABD: Soft, nondistended, nontender, normoactive bowel sounds    EXT: No edema, Upper ext contracted     SKIN: Warm and well perfused, no rashes noted.    RADIOLOGY:   CT C/A/P pending     A/P 75F with chronic resp failure - vent dependent, hx of subarachnoid hemorrhage, hx of cardiac arrest, dementia s/p PEG, HTN, DM2 on insulin, hx of CVA, GERD, COPD, [facility]/sepsis/aspiration PNA/anemia  from SNF sent here for abnormal labs.    1-RYAN  2-Hyperkalemia   3-Elevated Troponins     Neuro   -hx dementia, noverbal at baseline   CV  -pt HD stable   -repeat trop in 4hrs for delta change.   -EKG SR, inverted T in V1  -elevated BNP. No evidence of fluid overload.   -f/u CT chest   Pulm   -chronic trach/peg   -cont Vent AC18/400/40/+5  -Titrate to maintian O2S>93%  -cont bonchodilators   GI  -keep NPO   -chronic peg   --PPI   Renal   -RYAN, prerenal vs obtructive uropathy   -clinically hypovelemic. will give NS 1.5L IV bolusX1 then resume gently hydration 50cc/hr   -f/u CT abd/pelvis, r/o hydro   -hyperkalemia. Keyexalate given in ED. D50+insulin, Lokelma 10mg via peg q8hrs   -place vaughn for I/O's   -Renal eval.  Dr Carter called   -no indication for emergent HD   ID   -no evidence of active infection   -f/u CT, r/o infiltrate  -f/u COVID pcr          38min CC (Reviewing data, imaging, discussing with multidisciplinary team, non inclusive of procedures, discussing goals of care with patient/family)    REASON FOR CONSULT: RYAN    CONSULT REQUESTED BY: Dr Brown      HPI 75F with chronic resp failure - vent dependent, hx of subarachnoid hemorrhage, hx of cardiac arrest, dementia s/p PEG, HTN, DM2 on insulin, hx of CVA, GERD, COPD, [facility]/sepsis/aspiration PNA from SNF sent here for abnormal labs. Pt is nonverbal. History obtained from chart. In ED, na 119, k6.1, Bun 259, Cr 4.47, Trop 109.8. Critical care called for further management.         PAST MEDICAL & SURGICAL HISTORY:  Dementia of frontal lobe type    Aphasic stroke    Diabetes mellitus    Respiratory failure    Hypertension    GERD (gastroesophageal reflux disease)    Constipation    Respiratory failure    CVA (cerebral vascular accident)    HTN (hypertension)    DM (diabetes mellitus)    Advanced dementia    COVID-19 virus detected    Quadriplegia    Pneumonia    Type II diabetes mellitus    Hx of appendectomy    Gastrostomy in place    Tracheostomy in place    Tracheostomy tube present    Feeding by G-tube      Allergies    codeine (Hives)    Intolerances      FAMILY HISTORY:  No pertinent family history in first degree relatives        Review of Systems: UTO. pt nonverbal     Medications:  LORazepam 1 mg oral tablet: 1 tab(s) orally every 4 hours  · 	methocarbamol 500 mg oral tablet: 1 tab(s) orally 2 times a day  · 	insulin lispro 100 units/mL injectable solution: injectable 3 times a day , sliding scale coverage   · 	chlorhexidine 0.12% mucous membrane liquid: 15 milliliter(s) mucous membrane 2 times a day  · 	simethicone 80 mg oral tablet, chewable: 1 tab(s) orally every 6 hours  · 	pantoprazole 40 mg oral granule, delayed release: 40 milligram(s) by gastrostomy tube 2 times a day  · 	senna 8.6 mg oral tablet: 3 tab(s) by gastrostomy tube once a day (at bedtime)  · 	polyethylene glycol 3350 oral powder for reconstitution: 17 gram(s) orally every 12 hours  · 	albuterol 90 mcg/inh inhalation aerosol: 2 puff(s) inhaled every 6 hours  · 	Carafate 1 g/10 mL oral suspension: 10 milliliter(s) by gastrostomy tube 4 times a day (before meals and at bedtime) for 14 days (Started 21)  · 	ipratropium-albuterol 0.5 mg-2.5 mg/3 mL inhalation solution: 3 milliliter(s) inhaled 4 times a day  · 	Bacid (LAC) oral tablet: 2 tab(s) by gastrostomy tube once a day  · 	Tylenol 325 mg oral tablet: 2 tab(s) by gastrostomy tube once a day, 60 minutes prior to dressing change   · 	ProAir HFA 90 mcg/inh inhalation aerosol: 2 puff(s) inhaled every 6 hours                  dextrose 50% Injectable 50 milliLiter(s) IV Push once  insulin regular  human recombinant. 10 Unit(s) SubCutaneous once    sodium chloride 0.9%. 1000 milliLiter(s) IV Continuous <Continuous>        sodium polystyrene sulfonate Enema 45 Gram(s) Rectal Once      Mode: AC/ CMV (Assist Control/ Continuous Mandatory Ventilation)  RR (machine): 18  TV (machine): 400  FiO2: 50  PEEP: 5  ITime: 1  MAP: 12  PIP: 32      ICU Vital Signs Last 24 Hrs  T(C): --  T(F): --  HR: 74 (2022 21:09) (74 - 80)  BP: 106/81 (2022 20:22) (106/81 - 106/81)  BP(mean): --  ABP: --  ABP(mean): --  RR: 18 (2022 20:22) (18 - 18)  SpO2: 100% (2022 21:09) (99% - 100%)    Vital Signs Last 24 Hrs  T(C): --  T(F): --  HR: 74 (2022 21:09) (74 - 80)  BP: 106/81 (2022 20:22) (106/81 - 106/81)  BP(mean): --  RR: 18 (2022 20:22) (18 - 18)  SpO2: 100% (2022 21:09) (99% - 100%)        I&O's Detail        LABS:        119<LL>  |  82<L>  |  259<H>  ----------------------------<  211<H>  6.1<H>   |  27  |  4.47<H>    Ca    8.8      2022 21:21    TPro  7.3  /  Alb  1.9<L>  /  TBili  0.4  /  DBili  x   /  AST  362<H>  /  ALT  104<H>  /  AlkPhos  140<H>            CAPILLARY BLOOD GLUCOSE          Urinalysis Basic - ( 2022 23:14 )    Color: Yellow / Appearance: Clear / S.005 / pH: x  Gluc: x / Ketone: Negative  / Bili: Negative / Urobili: Negative mg/dL   Blood: x / Protein: 30 mg/dL / Nitrite: Negative   Leuk Esterase: Moderate / RBC: x / WBC x   Sq Epi: x / Non Sq Epi: x / Bacteria: x      CULTURES:      Physical Examination:    General: on vent. nonverbal. NAD    HEENT: Pupils equal, reactive to light.  Symmetric.    PULM: Clear to auscultation bilaterally    CVS: Regular rate and rhythm, no murmurs, rubs, or gallops    ABD: Soft, nondistended, nontender, normoactive bowel sounds    EXT: No edema, Upper ext contracted     SKIN: Warm and well perfused, no rashes noted.    RADIOLOGY:   CT C/A/P pending     A/P 75F with chronic resp failure - vent dependent, hx of subarachnoid hemorrhage, hx of cardiac arrest, dementia s/p PEG, HTN, DM2 on insulin, hx of CVA, GERD, COPD, [facility]/sepsis/aspiration PNA/anemia  from SNF sent here for abnormal labs.    1-RYAN  2-Hyperkalemia   3-Elevated Troponins     Neuro   -hx dementia, noverbal at baseline   CV  -pt HD stable   -repeat trop in 4hrs for delta change.   -EKG SR, inverted T in V1  -elevated BNP. No evidence of fluid overload.   -f/u CT chest   Pulm   -chronic trach/peg   -cont Vent AC18/400/40/+5  -Titrate to maintian O2S>93%  -cont bonchodilators   GI  -keep NPO   -chronic peg   --PPI   Renal   -RYAN, prerenal vs obtructive uropathy   -clinically hypovelemic. will give NS 1.5L IV bolusX1 then resume gently hydration 50cc/hr   -f/u CT abd/pelvis, r/o hydro   -hyperkalemia. Keyexalate given in ED. D50+insulin, Lokelma 10mg via peg q8hrs   -place vaughn for I/O's   -Renal eval.  Dr Carter called   -no indication for emergent HD   ID   -no evidence of active infection   -f/u CT, r/o infiltrate  -f/u COVID pcr      Discussed w/ EICU     38min CC (Reviewing data, imaging, discussing with multidisciplinary team, non inclusive of procedures, discussing goals of care with patient/family)    Render Risk Assessment In Note?: no Detail Level: Simple REASON FOR CONSULT: RYAN    CONSULT REQUESTED BY: Dr Brown      HPI 75F with chronic resp failure - vent dependent, bedbound, hx of subarachnoid hemorrhage, hx of cardiac arrest, dementia s/p PEG, HTN, DM2 on insulin, hx of CVA, GERD, COPD, recently at  for acute respiratory/sepsis/aspiration PNA from SNF sent here for abnormal labs. Pt is nonverbal. History obtained from chart. In ED, na 119, k6.1, Bun 259, Cr 4.47, Trop 109.8. Critical care called for further management.         PAST MEDICAL & SURGICAL HISTORY:  Dementia of frontal lobe type    Aphasic stroke    Diabetes mellitus    Respiratory failure    Hypertension    GERD (gastroesophageal reflux disease)    Constipation    Respiratory failure    CVA (cerebral vascular accident)    HTN (hypertension)    DM (diabetes mellitus)    Advanced dementia    COVID-19 virus detected    Quadriplegia    Pneumonia    Type II diabetes mellitus    Hx of appendectomy    Gastrostomy in place    Tracheostomy in place    Tracheostomy tube present    Feeding by G-tube      Allergies    codeine (Hives)    Intolerances      FAMILY HISTORY:  No pertinent family history in first degree relatives        Review of Systems: UTO. pt nonverbal     Medications:  LORazepam 1 mg oral tablet: 1 tab(s) orally every 4 hours  · 	methocarbamol 500 mg oral tablet: 1 tab(s) orally 2 times a day  · 	insulin lispro 100 units/mL injectable solution: injectable 3 times a day , sliding scale coverage   · 	chlorhexidine 0.12% mucous membrane liquid: 15 milliliter(s) mucous membrane 2 times a day  · 	simethicone 80 mg oral tablet, chewable: 1 tab(s) orally every 6 hours  · 	pantoprazole 40 mg oral granule, delayed release: 40 milligram(s) by gastrostomy tube 2 times a day  · 	senna 8.6 mg oral tablet: 3 tab(s) by gastrostomy tube once a day (at bedtime)  · 	polyethylene glycol 3350 oral powder for reconstitution: 17 gram(s) orally every 12 hours  · 	albuterol 90 mcg/inh inhalation aerosol: 2 puff(s) inhaled every 6 hours  · 	Carafate 1 g/10 mL oral suspension: 10 milliliter(s) by gastrostomy tube 4 times a day (before meals and at bedtime) for 14 days (Started 21)  · 	ipratropium-albuterol 0.5 mg-2.5 mg/3 mL inhalation solution: 3 milliliter(s) inhaled 4 times a day  · 	Bacid (LAC) oral tablet: 2 tab(s) by gastrostomy tube once a day  · 	Tylenol 325 mg oral tablet: 2 tab(s) by gastrostomy tube once a day, 60 minutes prior to dressing change   · 	ProAir HFA 90 mcg/inh inhalation aerosol: 2 puff(s) inhaled every 6 hours                  dextrose 50% Injectable 50 milliLiter(s) IV Push once  insulin regular  human recombinant. 10 Unit(s) SubCutaneous once    sodium chloride 0.9%. 1000 milliLiter(s) IV Continuous <Continuous>        sodium polystyrene sulfonate Enema 45 Gram(s) Rectal Once      Mode: AC/ CMV (Assist Control/ Continuous Mandatory Ventilation)  RR (machine): 18  TV (machine): 400  FiO2: 50  PEEP: 5  ITime: 1  MAP: 12  PIP: 32      ICU Vital Signs Last 24 Hrs  T(C): --  T(F): --  HR: 74 (2022 21:09) (74 - 80)  BP: 106/81 (2022 20:22) (106/81 - 106/81)  BP(mean): --  ABP: --  ABP(mean): --  RR: 18 (2022 20:22) (18 - 18)  SpO2: 100% (2022 21:09) (99% - 100%)    Vital Signs Last 24 Hrs  T(C): --  T(F): --  HR: 74 (2022 21:09) (74 - 80)  BP: 106/81 (2022 20:22) (106/81 - 106/81)  BP(mean): --  RR: 18 (2022 20:22) (18 - 18)  SpO2: 100% (2022 21:09) (99% - 100%)        I&O's Detail        LABS:        119<LL>  |  82<L>  |  259<H>  ----------------------------<  211<H>  6.1<H>   |  27  |  4.47<H>    Ca    8.8      2022 21:21    TPro  7.3  /  Alb  1.9<L>  /  TBili  0.4  /  DBili  x   /  AST  362<H>  /  ALT  104<H>  /  AlkPhos  140<H>            CAPILLARY BLOOD GLUCOSE          Urinalysis Basic - ( 2022 23:14 )    Color: Yellow / Appearance: Clear / S.005 / pH: x  Gluc: x / Ketone: Negative  / Bili: Negative / Urobili: Negative mg/dL   Blood: x / Protein: 30 mg/dL / Nitrite: Negative   Leuk Esterase: Moderate / RBC: x / WBC x   Sq Epi: x / Non Sq Epi: x / Bacteria: x      CULTURES:      Physical Examination:    General: on vent. nonverbal. NAD    HEENT: Pupils equal, reactive to light.  Symmetric.    PULM: Clear to auscultation bilaterally    CVS: Regular rate and rhythm, no murmurs, rubs, or gallops    ABD: Soft, nondistended, nontender, normoactive bowel sounds    EXT: No edema, Upper ext contracted     SKIN: Warm. Unstageable 5X3cm sacral would. +dry eschar noted on b/l 1st toes    RADIOLOGY:   CT C/A/P pending     A/P 75F with chronic resp failure - vent dependent, bedbound, hx subarachnoid hemorrhage, hx of cardiac arrest, dementia s/p PEG, HTN, DM2 on insulin, hx of CVA, GERD, COPD, [facility]/sepsis/aspiration PNA/anemia  from SNF sent here for abnormal labs.    1-RYAN  2-Hyperkalemia   3-Elevated Troponins     Neuro   -hx dementia, noverbal at baseline   CV  -pt HD stable   -repeat trop in 4hrs for delta change.   -EKG SR, inverted T in V1  -elevated BNP. No evidence of fluid overload.   -f/u CT chest   Pulm   -chronic trach/peg   -cont Vent AC18/400/40/+5  -Titrate to maintian O2S>93%  -cont bonchodilators   GI  -keep NPO   -chronic peg   --PPI   Renal   -RYAN, prerenal vs obtructive uropathy   -clinically hypovelemic. will give NS 1.5L IV bolusX1 then resume gently hydration 50cc/hr   -f/u CT abd/pelvis, r/o hydro   -hyperkalemia. Keyexalate given in ED. D50+insulin, Lokelma 10mg via peg q8hrs   -place vaughn for I/O's   -Renal eval.  Dr Carter called   -no indication for emergent HD   Addendum  -elevated CPKs likely 2/2 skin breakdown due to being bedbound   -cont IVF's   ID   -no evidence of active infection   -f/u CT, r/o infiltrate  -f/u COVID pcr      Discussed w/ EICU     38min CC (Reviewing data, imaging, discussing with multidisciplinary team, non inclusive of procedures, discussing goals of care with patient/family)    Additional Notes: Pt will schedule separate appt for removal.

## 2024-08-11 NOTE — ED ADULT NURSE NOTE - NS ED NURSE LEVEL OF CONSCIOUSNESS MENTAL STATUS
Left leg pain for 2-3 days, noticed swelling today. Denies chest pain or shortness of breath.    Stuporous/Responds to pain/Awake

## 2024-08-13 NOTE — H&P ADULT - NSHPPHYSICALEXAM_GEN_ALL_CORE
Lvm for reminder regarding yoga class this evening. Announced additional 11am Wednesday timeframe for future opportunities. EDWAR Rendon.  
Opens eyes but does not follow commands or track with eyes  Chronically ill appearing  trach, peg present  contracted  bilat breath sounds  reg rhythm  bilat edema

## 2024-08-14 NOTE — ED PROVIDER NOTE - IV ALTEPLASE DOOR HIDDEN
Bryant Roth MD as Gastroenterologist (Gastroenterology)  Debora Torres Cancer, Esophageal Neg Hx    • Cancer, Rectal Neg Hx        Current Outpatient Medications   Medication Sig Dispense Refill   • NALTREXONE HCL, PAIN, PO Take 0.25 mg by mouth. Take for 4 days and skip 3 days.     • ondansetron (ZOFRAN) 4 MG tablet Take 1 tablet by mouth every 8 hours as needed for Nausea. 30 tablet 0   • BD Eclipse Syringe 25G X 1\" 3 ML Misc USE FOR INJECTION AS DIRECTED.     • Needle, Disp, 27G X 1/2\" Misc as directed for vitamin b12 injection     • 2-3ML SYRINGE 3 ML Misc as directed     • vitamin B-12 (CYANOCOBALAMIN) 1000 MCG tablet one injection     • Iron Combinations (Iron Complex) Cap Take 1 tablet by mouth daily. Proferrin     • Multiple Vitamins-Minerals (PreserVision AREDS 2) Cap Take 1 tablet by mouth daily. 30 capsule    • cyanocobalamin 1000 MCG/ML injection Inject 1 mL into the skin every 7 days. 12 mL 3   • NUTRITIONAL SUPPLEMENTS PO Folinic acid betaine hydrochloride-zinlori 75-DGL powder     • B Complex Vitamins (VITAMIN B COMPLEX PO)      • Vitamin D-Vitamin K (VITAMIN K2-VITAMIN D3 PO)        No current facility-administered medications for this visit.        The following items on the Medicare Health Risk Assessment were found to be positive  14.) During the past 4 weeks, was someone available to help if you needed and wanted help?: Yes, some         Vision and Hearing screens: No results found.    Advance care planning documents on file - yes     Cognitive/Functional Status: no evidence of cognitive dysfunction by direct observation    Opioid Review: Dary is not taking opioid medications.    Recent PHQ 2/9 Score:    PHQ 2:  PHQ 2 Score Adult PHQ 2 Score Adult PHQ 2 Interpretation Little interest or pleasure in activity?   8/7/2024   8:51 PM 1 No further screening needed 0       PHQ 9:       DEPRESSION ASSESSMENT/PLAN:  Depression screening is negative no further plan needed.     Body mass index is 20.03 kg/m².    BMI FOLLOW-UP/PLAN:  BMI is within normal range.    Needed  show

## 2024-09-05 NOTE — PROGRESS NOTE ADULT - PROVIDER SPECIALTY LIST ADULT
Preferred method of results communication: [facility]       COVID-19 Vaccine (8 - 2023-24 season)  Overdue since 9/1/2024    Influenza Vaccine (1)  Order placed this encounter           Following review of the above:  Pended orders  Patient is not proceeding with: COVID-19    Note: Refer to final orders and clinician documentation.            Recent PHQ 2/9 Score       PHQ 2:     PHQ 2 Score Adult PHQ 2 Score Adult PHQ 2 Interpretation Little interest or pleasure in activity?   9/5/2024  10:07 AM 0 No further screening needed 0          PHQ 9:     PHQ 9 Score Adult PHQ 9 Score Adult PHQ 9 Interpretation   6/20/2019   1:10 PM 8 Mild Depression          Advance Directives:  on file    Pulmonology Critical Care

## 2024-10-15 NOTE — ED PROVIDER NOTE - EKG ADDITIONAL QUESTION - PERFORMED INDEPENDENT VISUALIZATION
The patient is a 59 y.o.Non- / non  male.    Chief Complaint   Patient presents with    Back Pain        HPI    Medication Refill:  Percocet    Pain score Today:  9  Adverse effects (Constipation / Nausea / Sedation / sexual Dysfunction / others) :  no  Mood: good  Sleep pattern and quality: poor  Activity level: good    Pill count Today: 0  Last dose taken  10/15/2024  OARRS report reviewed today: yes  ER/Hospitalizations/PCP visit related to pain since last visit:no   Any legal problems e.g. DUI etc.:No  Satisfied with current management:  yes    Opioid Contract: 10/17/2023  Last Urine Dug screen dated: 08/12/2024    Hemoglobin A1C   Date Value Ref Range Status   10/01/2024 6.4 (H) 4.0 - 6.0 % Final       Past Medical History, Past Surgical History, Social History, Allergies and Medications reviewed and updated in EPIC as indicated    Family History reviewed and is noncontributory.        Past Medical History:   Diagnosis Date    Asthma     COPD (chronic obstructive pulmonary disease) (HCC)     Diabetes mellitus (HCC)     HTN (hypertension)     Hyperlipidemia     Hypertension     Lung disease     Migraine     Neuropathy     Positive FIT (fecal immunochemical test)     Renal failure     Sleep apnea     CPAP        Past Surgical History:   Procedure Laterality Date    ANESTHESIA NERVE BLOCK Bilateral 09/18/2017    NERVE BLOCK BILATERAL MEDIAL BRANCH L2-L5 performed by Anmol Burnham MD at [facility] OR    ANESTHESIA NERVE BLOCK Bilateral 10/17/2017    NERVE BLOCK BILATERAL MEDIAL BRANCH L2-L5 performed by Anmol Burnham MD at [facility] OR    APPENDECTOMY      CHOLECYSTECTOMY  10/12/1999    COLONOSCOPY  03/08/2017    internal hemorrhoids; mild diverticulosis    COLONOSCOPY N/A 2/8/2024    COLORECTAL CANCER SCREENING, NOT HIGH RISK performed by Jatinder Arthur IV, DO at [facility] OR    ENDOSCOPY, COLON, DIAGNOSTIC      FIBULA FRACTURE SURGERY Left     LEG SURGERY Right     NERVE BLOCK N/A 10/24/2016    lumbar COLETTE  Yes

## 2024-10-23 NOTE — PROGRESS NOTE ADULT - ASSESSMENT
76 year old female with PMHx of dementia, COPD with chronic respiratory failure s/p trach, cardiac arrest, CHH, quadriplegia, CVA, CKD, anemia, PEG tube, and multiple hospital admissions for respiratory distress presents to the ED BIBEMS from [facility] for fever, SOB, and dark sputum output from trach.     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 Buenrostro    Infectious Diseases will continue to follow. Please call with any questions.   Andressa Brantley M.D.  [facility] Infectious Diseases [phone] DONT DRINK,   NO STOMACH ISSUE,   NO FH LIVER PROBLEM.

## 2024-12-05 NOTE — ED PROVIDER NOTE - NS_ACPWITHSCRIBE_ED_ALL_ED
yes
"I personally performed the services described in the documentation  recorded by the scribe in my presence, and it accurately and completely records my words and action.”

## 2024-12-21 NOTE — DISCHARGE NOTE ADULT - FLU SEASON?
[facility] ([place])    Reason for call: Lab Result Notification     Lab Result (including Rx patient on, if applicable).  If culture, copy of lab report at bottom.  Lab Result:   Component      Latest Ref Rng 12/21/2024  7:13 AM   INR      0.85 - 1.15  2.35 (H)       Legend:  (H) High    Component      Latest Ref Rng 12/21/2024  7:13 AM   Estimated Average Glucose      <117 mg/dL 120 (H)    Hemoglobin A1C      <5.7 % 5.8 (H)       Legend:  (H) High    Component      Latest Ref Rng 12/20/2024  11:43 PM   HDL Cholesterol      >=40 mg/dL 27 (L)       Legend:  (L) Low    Creatinine Level (mg/dl)   Creatinine   Date Value Ref Range Status   12/20/2024 1.24 (H) 0.67 - 1.17 mg/dL Final   09/01/2008 1.03 0.66 - 1.25 mg/dL Final     Comment:     New IDMS-traceable calibration  beginning 5/1/08    Creatinine clearance (ml/min), if applicable    Serum creatinine: 1.24 mg/dL (H) 12/20/24 1102  Estimated creatinine clearance: 83.4 mL/min (A)     ED Symptoms: Patient presented to [facility] ED on 12/20/2024 for transient vision loss in his right eye. History of Mitral valve repair on 12/5/2024 and on Coumadin    RN Recommendations/Instructions per [facility] ED lab result protocol:   [facility] ED lab result protocol utilized: Misc protocol  Advised pt discuss results with PCP/cardiologist      Patient's current Symptoms:   Spoke to patient and reviewed misc lab results with them. Patient states he feels tired today from all the tests in the middle of the night. Advised patient discuss lab results with his PCP and cardiologist. Patient verbalized understanding and agreement.    Sara Boyce RN   Yes...

## 2025-02-04 NOTE — GOALS OF CARE CONVERSATION - PERSONAL ADVANCE DIRECTIVE - CONVERSATION/DISCUSSION
MOLST Discussed Fluconazole Counseling:  Patient counseled regarding adverse effects of fluconazole including but not limited to headache, diarrhea, nausea, upset stomach, liver function test abnormalities, taste disturbance, and stomach pain.  There is a rare possibility of liver failure that can occur when taking fluconazole.  The patient understands that monitoring of LFTs and kidney function test may be required, especially at baseline. The patient verbalized understanding of the proper use and possible adverse effects of fluconazole.  All of the patient's questions and concerns were addressed. Solaraze Pregnancy And Lactation Text: This medication is Pregnancy Category B and is considered safe. There is some data to suggest avoiding during the third trimester. It is unknown if this medication is excreted in breast milk. Valtrex Pregnancy And Lactation Text: this medication is Pregnancy Category B and is considered safe during pregnancy. This medication is not directly found in breast milk but it's metabolite acyclovir is present. Libtayo Counseling- I discussed with the patient the risks of Libtayo including but not limited to nausea, vomiting, diarrhea, and bone or muscle pain.  The patient verbalized understanding of the proper use and possible adverse effects of Libtayo.  All of the patient's questions and concerns were addressed. Siliq Counseling:  I discussed with the patient the risks of Siliq including but not limited to new or worsening depression, suicidal thoughts and behavior, immunosuppression, malignancy, posterior leukoencephalopathy syndrome, and serious infections.  The patient understands that monitoring is required including a PPD at baseline and must alert us or the primary physician if symptoms of infection or other concerning signs are noted. There is also a special program designed to monitor depression which is required with Siliq. Opioid Pregnancy And Lactation Text: These medications can lead to premature delivery and should be avoided during pregnancy. These medications are also present in breast milk in small amounts. Oxybutynin Counseling:  I discussed with the patient the risks of oxybutynin including but not limited to skin rash, drowsiness, dry mouth, difficulty urinating, and blurred vision. Niacinamide Pregnancy And Lactation Text: These medications are considered safe during pregnancy. Humira Pregnancy And Lactation Text: This medication is Pregnancy Category B and is considered safe during pregnancy. It is unknown if this medication is excreted in breast milk. Opzelura Counseling:  I discussed with the patient the risks of Opzelura including but not limited to nasopharngitis, bronchitis, ear infection, eosinophila, hives, diarrhea, folliculitis, tonsillitis, and rhinorrhea.  Taken orally, this medication has been linked to serious infections; higher rate of mortality; malignancy and lymphoproliferative disorders; major adverse cardiovascular events; thrombosis; thrombocytopenia, anemia, and neutropenia; and lipid elevations. Ketoconazole Pregnancy And Lactation Text: This medication is Pregnancy Category C and it isn't know if it is safe during pregnancy. It is also excreted in breast milk and breast feeding isn't recommended. Cyclophosphamide Pregnancy And Lactation Text: This medication is Pregnancy Category D and it isn't considered safe during pregnancy. This medication is excreted in breast milk. Cimzia Counseling:  I discussed with the patient the risks of Cimzia including but not limited to immunosuppression, allergic reactions and infections.  The patient understands that monitoring is required including a PPD at baseline and must alert us or the primary physician if symptoms of infection or other concerning signs are noted. Litfulo Pregnancy And Lactation Text: There is insufficient data to evaluate whether or not Litfulo is safe to use during pregnancy.  Breastfeeding is not recommended during treatment. Birth Control Pills Counseling: Birth Control Pill Counseling: I discussed with the patient the potential side effects of OCPs including but not limited to increased risk of stroke, heart attack, thrombophlebitis, deep venous thrombosis, hepatic adenomas, breast changes, GI upset, headaches, and depression.  The patient verbalized understanding of the proper use and possible adverse effects of OCPs. All of the patient's questions and concerns were addressed. Gabapentin Counseling: I discussed with the patient the risks of gabapentin including but not limited to dizziness, somnolence, fatigue and ataxia. Azelaic Acid Counseling: Patient counseled that medicine may cause skin irritation and to avoid applying near the eyes.  In the event of skin irritation, the patient was advised to reduce the amount of the drug applied or use it less frequently.   The patient verbalized understanding of the proper use and possible adverse effects of azelaic acid.  All of the patient's questions and concerns were addressed. 5-Fu Pregnancy And Lactation Text: This medication is Pregnancy Category X and contraindicated in pregnancy and in women who may become pregnant. It is unknown if this medication is excreted in breast milk. Imiquimod Counseling:  I discussed with the patient the risks of imiquimod including but not limited to erythema, scaling, itching, weeping, crusting, and pain.  Patient understands that the inflammatory response to imiquimod is variable from person to person and was educated regarded proper titration schedule.  If flu-like symptoms develop, patient knows to discontinue the medication and contact us. Taltz Counseling: I discussed with the patient the risks of ixekizumab including but not limited to immunosuppression, serious infections, worsening of inflammatory bowel disease and drug reactions.  The patient understands that monitoring is required including a PPD at baseline and must alert us or the primary physician if symptoms of infection or other concerning signs are noted. Quinolones Counseling:  I discussed with the patient the risks of fluoroquinolones including but not limited to GI upset, allergic reaction, drug rash, diarrhea, dizziness, photosensitivity, yeast infections, liver function test abnormalities, tendonitis/tendon rupture. Siliq Pregnancy And Lactation Text: The risk during pregnancy and breastfeeding is uncertain with this medication. Topical Sulfur Applications Pregnancy And Lactation Text: This medication is Pregnancy Category C and has an unknown safety profile during pregnancy. It is unknown if this topical medication is excreted in breast milk. Valtrex Counseling: I discussed with the patient the risks of valacyclovir including but not limited to kidney damage, nausea, vomiting and severe allergy.  The patient understands that if the infection seems to be worsening or is not improving, they are to call. Topical Retinoid counseling:  Patient advised to apply a pea-sized amount only at bedtime and wait 30 minutes after washing their face before applying.  If too drying, patient may add a non-comedogenic moisturizer. The patient verbalized understanding of the proper use and possible adverse effects of retinoids.  All of the patient's questions and concerns were addressed. Libtayo Pregnancy And Lactation Text: This medication is contraindicated in pregnancy and when breast feeding. Clindamycin Pregnancy And Lactation Text: This medication can be used in pregnancy if certain situations. Clindamycin is also present in breast milk. Albendazole Counseling:  I discussed with the patient the risks of albendazole including but not limited to cytopenia, kidney damage, nausea/vomiting and severe allergy.  The patient understands that this medication is being used in an off-label manner. Hyrimoz Counseling:  I discussed with the patient the risks of adalimumab including but not limited to myelosuppression, immunosuppression, autoimmune hepatitis, demyelinating diseases, lymphoma, and serious infections.  The patient understands that monitoring is required including a PPD at baseline and must alert us or the primary physician if symptoms of infection or other concerning signs are noted. Opzelura Pregnancy And Lactation Text: There is insufficient data to evaluate drug-associated risk for major birth defects, miscarriage, or other adverse maternal or fetal outcomes.  There is a pregnancy registry that monitors pregnancy outcomes in pregnant persons exposed to the medication during pregnancy.  It is unknown if this medication is excreted in breast milk.  Do not breastfeed during treatment and for about 4 weeks after the last dose. Fluconazole Pregnancy And Lactation Text: This medication is Pregnancy Category C and it isn't know if it is safe during pregnancy. It is also excreted in breast milk. Terbinafine Counseling: Patient counseling regarding adverse effects of terbinafine including but not limited to headache, diarrhea, rash, upset stomach, liver function test abnormalities, itching, taste/smell disturbance, nausea, abdominal pain, and flatulence.  There is a rare possibility of liver failure that can occur when taking terbinafine.  The patient understands that a baseline LFT and kidney function test may be required. The patient verbalized understanding of the proper use and possible adverse effects of terbinafine.  All of the patient's questions and concerns were addressed. Oxybutynin Pregnancy And Lactation Text: This medication is Pregnancy Category B and is considered safe during pregnancy. It is unknown if it is excreted in breast milk. Cyclophosphamide Counseling:  I discussed with the patient the risks of cyclophosphamide including but not limited to hair loss, hormonal abnormalities, decreased fertility, abdominal pain, diarrhea, nausea and vomiting, bone marrow suppression and infection. The patient understands that monitoring is required while taking this medication. Cimzia Pregnancy And Lactation Text: This medication crosses the placenta but can be considered safe in certain situations. Cimzia may be excreted in breast milk. Nsaids Counseling: NSAID Counseling: I discussed with the patient that NSAIDs should be taken with food. Prolonged use of NSAIDs can result in the development of stomach ulcers.  Patient advised to stop taking NSAIDs if abdominal pain occurs.  The patient verbalized understanding of the proper use and possible adverse effects of NSAIDs.  All of the patient's questions and concerns were addressed. Acitretin Counseling:  I discussed with the patient the risks of acitretin including but not limited to hair loss, dry lips/skin/eyes, liver damage, hyperlipidemia, depression/suicidal ideation, photosensitivity.  Serious rare side effects can include but are not limited to pancreatitis, pseudotumor cerebri, bony changes, clot formation/stroke/heart attack.  Patient understands that alcohol is contraindicated since it can result in liver toxicity and significantly prolong the elimination of the drug by many years. Olumiant Counseling: I discussed with the patient the risks of Olumiant therapy including but not limited to upper respiratory tract infections, shingles, cold sores, and nausea. Live vaccines should be avoided.  This medication has been linked to serious infections; higher rate of mortality; malignancy and lymphoproliferative disorders; major adverse cardiovascular events; thrombosis; gastrointestinal perforations; neutropenia; lymphopenia; anemia; liver enzyme elevations; and lipid elevations. Finasteride Pregnancy And Lactation Text: This medication is absolutely contraindicated during pregnancy. It is unknown if it is excreted in breast milk. Gabapentin Pregnancy And Lactation Text: This medication is Pregnancy Category C and isn't considered safe during pregnancy. It is excreted in breast milk. Drysol Counseling:  I discussed with the patient the risks of drysol/aluminum chloride including but not limited to skin rash, itching, irritation, burning. Imiquimod Pregnancy And Lactation Text: This medication is Pregnancy Category C. It is unknown if this medication is excreted in breast milk. Arava Counseling:  Patient counseled regarding adverse effects of Arava including but not limited to nausea, vomiting, abnormalities in liver function tests. Patients may develop mouth sores, rash, diarrhea, and abnormalities in blood counts. The patient understands that monitoring is required including LFTs and blood counts.  There is a rare possibility of scarring of the liver and lung problems that can occur when taking methotrexate. Persistent nausea, loss of appetite, pale stools, dark urine, cough, and shortness of breath should be reported immediately. Patient advised to discontinue Arava treatment and consult with a physician prior to attempting conception. The patient will have to undergo a treatment to eliminate Arava from the body prior to conception. Azelaic Acid Pregnancy And Lactation Text: This medication is considered safe during pregnancy and breast feeding. Doxycycline Counseling:  Patient counseled regarding possible photosensitivity and increased risk for sunburn.  Patient instructed to avoid sunlight, if possible.  When exposed to sunlight, patients should wear protective clothing, sunglasses, and sunscreen.  The patient was instructed to call the office immediately if the following severe adverse effects occur:  hearing changes, easy bruising/bleeding, severe headache, or vision changes.  The patient verbalized understanding of the proper use and possible adverse effects of doxycycline.  All of the patient's questions and concerns were addressed. Albendazole Pregnancy And Lactation Text: This medication is Pregnancy Category C and it isn't known if it is safe during pregnancy. It is also excreted in breast milk. Simponi Counseling:  I discussed with the patient the risks of golimumab including but not limited to myelosuppression, immunosuppression, autoimmune hepatitis, demyelinating diseases, lymphoma, and serious infections.  The patient understands that monitoring is required including a PPD at baseline and must alert us or the primary physician if symptoms of infection or other concerning signs are noted. Wartpeel Counseling:  I discussed with the patient the risks of Wartpeel including but not limited to erythema, scaling, itching, weeping, crusting, and pain. Picato Counseling:  I discussed with the patient the risks of Picato including but not limited to erythema, scaling, itching, weeping, crusting, and pain. Odomzo Counseling- I discussed with the patient the risks of Odomzo including but not limited to nausea, vomiting, diarrhea, constipation, weight loss, changes in the sense of taste, decreased appetite, muscle spasms, and hair loss.  The patient verbalized understanding of the proper use and possible adverse effects of Odomzo.  All of the patient's questions and concerns were addressed. Griseofulvin Counseling:  I discussed with the patient the risks of griseofulvin including but not limited to photosensitivity, cytopenia, liver damage, nausea/vomiting and severe allergy.  The patient understands that this medication is best absorbed when taken with a fatty meal (e.g., ice cream or french fries). Tranexamic Acid Pregnancy And Lactation Text: It is unknown if this medication is safe during pregnancy or breast feeding. Terbinafine Pregnancy And Lactation Text: This medication is Pregnancy Category B and is considered safe during pregnancy. It is also excreted in breast milk and breast feeding isn't recommended. Propranolol Counseling:  I discussed with the patient the risks of propranolol including but not limited to low heart rate, low blood pressure, low blood sugar, restlessness and increased cold sensitivity. They should call the office if they experience any of these side effects. Cosentyx Counseling:  I discussed with the patient the risks of Cosentyx including but not limited to worsening of Crohn's disease, immunosuppression, allergic reactions and infections.  The patient understands that monitoring is required including a PPD at baseline and must alert us or the primary physician if symptoms of infection or other concerning signs are noted. Nsaids Pregnancy And Lactation Text: These medications are considered safe up to 30 weeks gestation. It is excreted in breast milk. Cellcept Pregnancy And Lactation Text: This medication is Pregnancy Category D and isn't considered safe during pregnancy. It is unknown if this medication is excreted in breast milk. Glycopyrrolate Counseling:  I discussed with the patient the risks of glycopyrrolate including but not limited to skin rash, drowsiness, dry mouth, difficulty urinating, and blurred vision. Finasteride Female Counseling: Finasteride Counseling:  I discussed with the patient the risks of use of finasteride including but not limited to decreased libido and sexual dysfunction. Explained the teratogenic nature of the medication and stressed the importance of not getting pregnant during treatment. All of the patient's questions and concerns were addressed. Acitretin Pregnancy And Lactation Text: This medication is Pregnancy Category X and should not be given to women who are pregnant or may become pregnant in the future. This medication is excreted in breast milk. Tremfya Counseling: I discussed with the patient the risks of guselkumab including but not limited to immunosuppression, serious infections, worsening of inflammatory bowel disease and drug reactions.  The patient understands that monitoring is required including a PPD at baseline and must alert us or the primary physician if symptoms of infection or other concerning signs are noted. Arava Pregnancy And Lactation Text: This medication is Pregnancy Category X and is absolutely contraindicated during pregnancy. It is unknown if it is excreted in breast milk. Rifampin Counseling: I discussed with the patient the risks of rifampin including but not limited to liver damage, kidney damage, red-orange body fluids, nausea/vomiting and severe allergy. Olumiant Pregnancy And Lactation Text: Based on animal studies, Olumiant may cause embryo-fetal harm when administered to pregnant women.  The medication should not be used in pregnancy.  Breastfeeding is not recommended during treatment. Minoxidil Counseling: Minoxidil is a topical medication which can increase blood flow where it is applied. It is uncertain how this medication increases hair growth. Side effects are uncommon and include stinging and allergic reactions. Benzoyl Peroxide Counseling: Patient counseled that medicine may cause skin irritation and bleach clothing.  In the event of skin irritation, the patient was advised to reduce the amount of the drug applied or use it less frequently.   The patient verbalized understanding of the proper use and possible adverse effects of benzoyl peroxide.  All of the patient's questions and concerns were addressed. Ivermectin Counseling:  Patient instructed to take medication on an empty stomach with a full glass of water.  Patient informed of potential adverse effects including but not limited to nausea, diarrhea, dizziness, itching, and swelling of the extremities or lymph nodes.  The patient verbalized understanding of the proper use and possible adverse effects of ivermectin.  All of the patient's questions and concerns were addressed. Doxycycline Pregnancy And Lactation Text: This medication is Pregnancy Category D and not consider safe during pregnancy. It is also excreted in breast milk but is considered safe for shorter treatment courses. Tazorac Counseling:  Patient advised that medication is irritating and drying.  Patient may need to apply sparingly and wash off after an hour before eventually leaving it on overnight.  The patient verbalized understanding of the proper use and possible adverse effects of tazorac.  All of the patient's questions and concerns were addressed. Griseofulvin Pregnancy And Lactation Text: This medication is Pregnancy Category X and is known to cause serious birth defects. It is unknown if this medication is excreted in breast milk but breast feeding should be avoided. Propranolol Pregnancy And Lactation Text: This medication is Pregnancy Category C and it isn't known if it is safe during pregnancy. It is excreted in breast milk. Azithromycin Counseling:  I discussed with the patient the risks of azithromycin including but not limited to GI upset, allergic reaction, drug rash, diarrhea, and yeast infections. Ilumya Counseling: I discussed with the patient the risks of tildrakizumab including but not limited to immunosuppression, malignancy, posterior leukoencephalopathy syndrome, and serious infections.  The patient understands that monitoring is required including a PPD at baseline and must alert us or the primary physician if symptoms of infection or other concerning signs are noted. Olanzapine Counseling- I discussed with the patient the common side effects of olanzapine including but are not limited to: lack of energy, dry mouth, increased appetite, sleepiness, tremor, constipation, dizziness, changes in behavior, or restlessness.  Explained that teenagers are more likely to experience headaches, abdominal pain, pain in the arms or legs, tiredness, and sleepiness.  Serious side effects include but are not limited: increased risk of death in elderly patients who are confused, have memory loss, or dementia-related psychosis; hyperglycemia; increased cholesterol and triglycerides; and weight gain. Cellcept Counseling:  I discussed with the patient the risks of mycophenolate mofetil including but not limited to infection/immunosuppression, GI upset, hypokalemia, hypercholesterolemia, bone marrow suppression, lymphoproliferative disorders, malignancy, GI ulceration/bleed/perforation, colitis, interstitial lung disease, kidney failure, progressive multifocal leukoencephalopathy, and birth defects.  The patient understands that monitoring is required including a baseline creatinine and regular CBC testing. In addition, patient must alert us immediately if symptoms of infection or other concerning signs are noted. Bexarotene Counseling:  I discussed with the patient the risks of bexarotene including but not limited to hair loss, dry lips/skin/eyes, liver abnormalities, hyperlipidemia, pancreatitis, depression/suicidal ideation, photosensitivity, drug rash/allergic reactions, hypothyroidism, anemia, leukopenia, infection, cataracts, and teratogenicity.  Patient understands that they will need regular blood tests to check lipid profile, liver function tests, white blood cell count, thyroid function tests and pregnancy test if applicable. Glycopyrrolate Pregnancy And Lactation Text: This medication is Pregnancy Category B and is considered safe during pregnancy. It is unknown if it is excreted breast milk. Rifampin Pregnancy And Lactation Text: This medication is Pregnancy Category C and it isn't know if it is safe during pregnancy. It is also excreted in breast milk and should not be used if you are breast feeding. Rinvoq Counseling: I discussed with the patient the risks of Rinvoq therapy including but not limited to upper respiratory tract infections, shingles, cold sores, bronchitis, nausea, cough, fever, acne, and headache. Live vaccines should be avoided.  This medication has been linked to serious infections; higher rate of mortality; malignancy and lymphoproliferative disorders; major adverse cardiovascular events; thrombosis; thrombocytopenia, anemia, and neutropenia; lipid elevations; liver enzyme elevations; and gastrointestinal perforations. Clofazimine Counseling:  I discussed with the patient the risks of clofazimine including but not limited to skin and eye pigmentation, liver damage, nausea/vomiting, gastrointestinal bleeding and allergy. Winlevi Counseling:  I discussed with the patient the risks of topical clascoterone including but not limited to erythema, scaling, itching, and stinging. Patient voiced their understanding. Benzoyl Peroxide Pregnancy And Lactation Text: This medication is Pregnancy Category C. It is unknown if benzoyl peroxide is excreted in breast milk. Cimetidine Counseling:  I discussed with the patient the risks of Cimetidine including but not limited to gynecomastia, headache, diarrhea, nausea, drowsiness, arrhythmias, pancreatitis, skin rashes, psychosis, bone marrow suppression and kidney toxicity. Erythromycin Counseling:  I discussed with the patient the risks of erythromycin including but not limited to GI upset, allergic reaction, drug rash, diarrhea, increase in liver enzymes, and yeast infections. Elidel Counseling: Patient may experience a mild burning sensation during topical application. Elidel is not approved in children less than 2 years of age. There have been case reports of hematologic and skin malignancies in patients using topical calcineurin inhibitors although causality is questionable. Minoxidil Pregnancy And Lactation Text: This medication has not been assigned a Pregnancy Risk Category but animal studies failed to show danger with the topical medication. It is unknown if the medication is excreted in breast milk. Itraconazole Counseling:  I discussed with the patient the risks of itraconazole including but not limited to liver damage, nausea/vomiting, neuropathy, and severe allergy.  The patient understands that this medication is best absorbed when taken with acidic beverages such as non-diet cola or ginger ale.  The patient understands that monitoring is required including baseline LFTs and repeat LFTs at intervals.  The patient understands that they are to contact us or the primary physician if concerning signs are noted. Azithromycin Pregnancy And Lactation Text: This medication is considered safe during pregnancy and is also secreted in breast milk. Tazorac Pregnancy And Lactation Text: This medication is not safe during pregnancy. It is unknown if this medication is excreted in breast milk. Skyrizi Counseling: I discussed with the patient the risks of risankizumab-rzaa including but not limited to immunosuppression, and serious infections.  The patient understands that monitoring is required including a PPD at baseline and must alert us or the primary physician if symptoms of infection or other concerning signs are noted. SSKI Counseling:  I discussed with the patient the risks of SSKI including but not limited to thyroid abnormalities, metallic taste, GI upset, fever, headache, acne, arthralgias, paraesthesias, lymphadenopathy, easy bleeding, arrhythmias, and allergic reaction. Protopic Counseling: Patient may experience a mild burning sensation during topical application. Protopic is not approved in children less than 2 years of age. There have been case reports of hematologic and skin malignancies in patients using topical calcineurin inhibitors although causality is questionable. Olanzapine Pregnancy And Lactation Text: This medication is pregnancy category C.   There are no adequate and well controlled trials with olanzapine in pregnant females.  Olanzapine should be used during pregnancy only if the potential benefit justifies the potential risk to the fetus.   In a study in lactating healthy women, olanzapine was excreted in breast milk.  It is recommended that women taking olanzapine should not breast feed. Prednisone Counseling:  I discussed with the patient the risks of prolonged use of prednisone including but not limited to weight gain, insomnia, osteoporosis, mood changes, diabetes, susceptibility to infection, glaucoma and high blood pressure.  In cases where prednisone use is prolonged, patients should be monitored with blood pressure checks, serum glucose levels and an eye exam.  Additionally, the patient may need to be placed on GI prophylaxis, PCP prophylaxis, and calcium and vitamin D supplementation and/or a bisphosphonate.  The patient verbalized understanding of the proper use and the possible adverse effects of prednisone.  All of the patient's questions and concerns were addressed. Hydroxychloroquine Counseling:  I discussed with the patient that a baseline ophthalmologic exam is needed at the start of therapy and every year thereafter while on therapy. A CBC may also be warranted for monitoring.  The side effects of this medication were discussed with the patient, including but not limited to agranulocytosis, aplastic anemia, seizures, rashes, retinopathy, and liver toxicity. Patient instructed to call the office should any adverse effect occur.  The patient verbalized understanding of the proper use and possible adverse effects of Plaquenil.  All the patient's questions and concerns were addressed. Dupixent Counseling: I discussed with the patient the risks of dupilumab including but not limited to eye infection and irritation, cold sores, injection site reactions, worsening of asthma, allergic reactions and increased risk of parasitic infection.  Live vaccines should be avoided while taking dupilumab. Dupilumab will also interact with certain medications such as warfarin and cyclosporine. The patient understands that monitoring is required and they must alert us or the primary physician if symptoms of infection or other concerning signs are noted. Bexarotene Pregnancy And Lactation Text: This medication is Pregnancy Category X and should not be given to women who are pregnant or may become pregnant. This medication should not be used if you are breast feeding. Sarecycline Counseling: Patient advised regarding possible photosensitivity and discoloration of the teeth, skin, lips, tongue and gums.  Patient instructed to avoid sunlight, if possible.  When exposed to sunlight, patients should wear protective clothing, sunglasses, and sunscreen.  The patient was instructed to call the office immediately if the following severe adverse effects occur:  hearing changes, easy bruising/bleeding, severe headache, or vision changes.  The patient verbalized understanding of the proper use and possible adverse effects of sarecycline.  All of the patient's questions and concerns were addressed. Rinvoq Pregnancy And Lactation Text: Based on animal studies, Rinvoq may cause embryo-fetal harm when administered to pregnant women.  The medication should not be used in pregnancy.  Breastfeeding is not recommended during treatment and for 6 days after the last dose. Dutasteride Male Counseling: Dustasteride Counseling:  I discussed with the patient the risks of use of dutasteride including but not limited to decreased libido, decreased ejaculate volume, and gynecomastia. Women who can become pregnant should not handle medication.  All of the patient's questions and concerns were addressed. Xolair Counseling:  Patient informed of potential adverse effects including but not limited to fever, muscle aches, rash and allergic reactions.  The patient verbalized understanding of the proper use and possible adverse effects of Xolair.  All of the patient's questions and concerns were addressed. Carac Counseling:  I discussed with the patient the risks of Carac including but not limited to erythema, scaling, itching, weeping, crusting, and pain. Winlevi Pregnancy And Lactation Text: This medication is considered safe during pregnancy and breastfeeding. Mirvaso Counseling: Mirvaso is a topical medication which can decrease superficial blood flow where applied. Side effects are uncommon and include stinging, redness and allergic reactions. Erythromycin Pregnancy And Lactation Text: This medication is Pregnancy Category B and is considered safe during pregnancy. It is also excreted in breast milk. Protopic Pregnancy And Lactation Text: This medication is Pregnancy Category C. It is unknown if this medication is excreted in breast milk when applied topically. Detail Level: Zone Sski Pregnancy And Lactation Text: This medication is Pregnancy Category D and isn't considered safe during pregnancy. It is excreted in breast milk. Topical Clindamycin Counseling: Patient counseled that this medication may cause skin irritation or allergic reactions.  In the event of skin irritation, the patient was advised to reduce the amount of the drug applied or use it less frequently.   The patient verbalized understanding of the proper use and possible adverse effects of clindamycin.  All of the patient's questions and concerns were addressed. Bactrim Counseling:  I discussed with the patient the risks of sulfa antibiotics including but not limited to GI upset, allergic reaction, drug rash, diarrhea, dizziness, photosensitivity, and yeast infections.  Rarely, more serious reactions can occur including but not limited to aplastic anemia, agranulocytosis, methemoglobinemia, blood dyscrasias, liver or kidney failure, lung infiltrates or desquamative/blistering drug rashes. Infliximab Counseling:  I discussed with the patient the risks of infliximab including but not limited to myelosuppression, immunosuppression, autoimmune hepatitis, demyelinating diseases, lymphoma, and serious infections.  The patient understands that monitoring is required including a PPD at baseline and must alert us or the primary physician if symptoms of infection or other concerning signs are noted. Oral Minoxidil Counseling- I discussed with the patient the risks of oral minoxidil including but not limited to shortness of breath, swelling of the feet or ankles, dizziness, lightheadedness, unwanted hair growth and allergic reaction.  The patient verbalized understanding of the proper use and possible adverse effects of oral minoxidil.  All of the patient's questions and concerns were addressed. Prednisone Pregnancy And Lactation Text: This medication is Pregnancy Category C and it isn't know if it is safe during pregnancy. This medication is excreted in breast milk. Azathioprine Counseling:  I discussed with the patient the risks of azathioprine including but not limited to myelosuppression, immunosuppression, hepatotoxicity, lymphoma, and infections.  The patient understands that monitoring is required including baseline LFTs, Creatinine, possible TPMP genotyping and weekly CBCs for the first month and then every 2 weeks thereafter.  The patient verbalized understanding of the proper use and possible adverse effects of azathioprine.  All of the patient's questions and concerns were addressed. Dupixent Pregnancy And Lactation Text: This medication likely crosses the placenta but the risk for the fetus is uncertain. This medication is excreted in breast milk. Isotretinoin Counseling: Patient should get monthly blood tests, not donate blood, not drive at night if vision affected, not share medication, and not undergo elective surgery for 6 months after tx completed. Side effects reviewed, pt to contact office should one occur. Dutasteride Female Counseling: Dutasteride Counseling:  I discussed with the patient the risks of use of dutasteride including but not limited to decreased libido and sexual dysfunction. Explained the teratogenic nature of the medication and stressed the importance of not getting pregnant during treatment. All of the patient's questions and concerns were addressed. Methotrexate Pregnancy And Lactation Text: This medication is Pregnancy Category X and is known to cause fetal harm. This medication is excreted in breast milk. Adbry Counseling: I discussed with the patient the risks of tralokinumab including but not limited to eye infection and irritation, cold sores, injection site reactions, worsening of asthma, allergic reactions and increased risk of parasitic infection.  Live vaccines should be avoided while taking tralokinumab. The patient understands that monitoring is required and they must alert us or the primary physician if symptoms of infection or other concerning signs are noted. Sotyktu Counseling:  I discussed the most common side effects of Sotyktu including: common cold, sore throat, sinus infections, cold sores, canker sores, folliculitis, and acne.? I also discussed more serious side effects of Sotyktu including but not limited to: serious allergic reactions; increased risk for infections such as TB; cancers such as lymphomas; rhabdomyolysis and elevated CPK; and elevated triglycerides and liver enzymes.? Doxepin Counseling:  Patient advised that the medication is sedating and not to drive a car after taking this medication. Patient informed of potential adverse effects including but not limited to dry mouth, urinary retention, and blurry vision.  The patient verbalized understanding of the proper use and possible adverse effects of doxepin.  All of the patient's questions and concerns were addressed. Hydroxychloroquine Pregnancy And Lactation Text: This medication has been shown to cause fetal harm but it isn't assigned a Pregnancy Risk Category. There are small amounts excreted in breast milk. Eucrisa Counseling: Patient may experience a mild burning sensation during topical application. Eucrisa is not approved in children less than 2 years of age. Mirvaso Pregnancy And Lactation Text: This medication has not been assigned a Pregnancy Risk Category. It is unknown if the medication is excreted in breast milk. Sarecycline Pregnancy And Lactation Text: This medication is Pregnancy Category D and not consider safe during pregnancy. It is also excreted in breast milk. Xolair Pregnancy And Lactation Text: This medication is Pregnancy Category B and is considered safe during pregnancy. This medication is excreted in breast milk. Colchicine Counseling:  Patient counseled regarding adverse effects including but not limited to stomach upset (nausea, vomiting, stomach pain, or diarrhea).  Patient instructed to limit alcohol consumption while taking this medication.  Colchicine may reduce blood counts especially with prolonged use.  The patient understands that monitoring of kidney function and blood counts may be required, especially at baseline. The patient verbalized understanding of the proper use and possible adverse effects of colchicine.  All of the patient's questions and concerns were addressed. Metronidazole Counseling:  I discussed with the patient the risks of metronidazole including but not limited to seizures, nausea/vomiting, a metallic taste in the mouth, nausea/vomiting and severe allergy. Spevigo Counseling: I discussed with the patient the risks of Spevigo including but not limited to fatigue, nasuea, vomiting, headache, pruritus, urinary tract infection, an infusion related reactions.  The patient understands that monitoring is required including screening for tuberculosis at baseline and yearly screening thereafter while continuing Spevigo therapy. They should contact us if symptoms of infection or other concerning signs are noted. Zyclara Counseling:  I discussed with the patient the risks of imiquimod including but not limited to erythema, scaling, itching, weeping, crusting, and pain.  Patient understands that the inflammatory response to imiquimod is variable from person to person and was educated regarded proper titration schedule.  If flu-like symptoms develop, patient knows to discontinue the medication and contact us. Ketoconazole Counseling:   Patient counseled regarding improving absorption with orange juice.  Adverse effects include but are not limited to breast enlargement, headache, diarrhea, nausea, upset stomach, liver function test abnormalities, taste disturbance, and stomach pain.  There is a rare possibility of liver failure that can occur when taking ketoconazole. The patient understands that monitoring of LFTs may be required, especially at baseline. The patient verbalized understanding of the proper use and possible adverse effects of ketoconazole.  All of the patient's questions and concerns were addressed. Rhofade Counseling: Rhofade is a topical medication which can decrease superficial blood flow where applied. Side effects are uncommon and include stinging, redness and allergic reactions. Bactrim Pregnancy And Lactation Text: This medication is Pregnancy Category D and is known to cause fetal risk.  It is also excreted in breast milk. Enbrel Counseling:  I discussed with the patient the risks of etanercept including but not limited to myelosuppression, immunosuppression, autoimmune hepatitis, demyelinating diseases, lymphoma, and infections.  The patient understands that monitoring is required including a PPD at baseline and must alert us or the primary physician if symptoms of infection or other concerning signs are noted. Thalidomide Counseling: I discussed with the patient the risks of thalidomide including but not limited to birth defects, anxiety, weakness, chest pain, dizziness, cough and severe allergy. Isotretinoin Pregnancy And Lactation Text: This medication is Pregnancy Category X and is considered extremely dangerous during pregnancy. It is unknown if it is excreted in breast milk. Methotrexate Counseling:  Patient counseled regarding adverse effects of methotrexate including but not limited to nausea, vomiting, abnormalities in liver function tests. Patients may develop mouth sores, rash, diarrhea, and abnormalities in blood counts. The patient understands that monitoring is required including LFT's and blood counts.  There is a rare possibility of scarring of the liver and lung problems that can occur when taking methotrexate. Persistent nausea, loss of appetite, pale stools, dark urine, cough, and shortness of breath should be reported immediately. Patient advised to discontinue methotrexate treatment at least three months before attempting to become pregnant.  I discussed the need for folate supplements while taking methotrexate.  These supplements can decrease side effects during methotrexate treatment. The patient verbalized understanding of the proper use and possible adverse effects of methotrexate.  All of the patient's questions and concerns were addressed. Oral Minoxidil Pregnancy And Lactation Text: This medication should only be used when clearly needed if you are pregnant, attempting to become pregnant or breast feeding. Dutasteride Pregnancy And Lactation Text: This medication is absolutely contraindicated in women, especially during pregnancy and breast feeding. Feminization of male fetuses is possible if taking while pregnant. Low Dose Naltrexone Counseling- I discussed with the patient the potential risks and side effects of low dose naltrexone including but not limited to: more vivid dreams, headaches, nausea, vomiting, abdominal pain, fatigue, dizziness, and anxiety. Adbry Pregnancy And Lactation Text: It is unknown if this medication will adversely affect pregnancy or breast feeding. Sotyktu Pregnancy And Lactation Text: There is insufficient data to evaluate whether or not Sotyktu is safe to use during pregnancy.? ?It is not known if Sotyktu passes into breast milk and whether or not it is safe to use when breastfeeding.?? Doxepin Pregnancy And Lactation Text: This medication is Pregnancy Category C and it isn't known if it is safe during pregnancy. It is also excreted in breast milk and breast feeding isn't recommended. Tetracycline Counseling: Patient counseled regarding possible photosensitivity and increased risk for sunburn.  Patient instructed to avoid sunlight, if possible.  When exposed to sunlight, patients should wear protective clothing, sunglasses, and sunscreen.  The patient was instructed to call the office immediately if the following severe adverse effects occur:  hearing changes, easy bruising/bleeding, severe headache, or vision changes.  The patient verbalized understanding of the proper use and possible adverse effects of tetracycline.  All of the patient's questions and concerns were addressed. Patient understands to avoid pregnancy while on therapy due to potential birth defects. Calcipotriene Counseling:  I discussed with the patient the risks of calcipotriene including but not limited to erythema, scaling, itching, and irritation. Metronidazole Pregnancy And Lactation Text: This medication is Pregnancy Category B and considered safe during pregnancy.  It is also excreted in breast milk. Cibinqo Counseling: I discussed with the patient the risks of Cibinqo therapy including but not limited to common cold, nausea, headache, cold sores, increased blood CPK levels, dizziness, UTIs, fatigue, acne, and vomitting. Live vaccines should be avoided.  This medication has been linked to serious infections; higher rate of mortality; malignancy and lymphoproliferative disorders; major adverse cardiovascular events; thrombosis; thrombocytopenia and lymphopenia; lipid elevations; and retinal detachment. Spevigo Pregnancy And Lactation Text: The risk during pregnancy and breastfeeding is uncertain with this medication. This medication does cross the placenta. It is unknown if this medication is found in breast milk. Spironolactone Pregnancy And Lactation Text: This medication can cause feminization of the male fetus and should be avoided during pregnancy. The active metabolite is also found in breast milk. Topical Ketoconazole Counseling: Patient counseled that this medication may cause skin irritation or allergic reactions.  In the event of skin irritation, the patient was advised to reduce the amount of the drug applied or use it less frequently.   The patient verbalized understanding of the proper use and possible adverse effects of ketoconazole.  All of the patient's questions and concerns were addressed. Rituxan Counseling:  I discussed with the patient the risks of Rituxan infusions. Side effects can include infusion reactions, severe drug rashes including mucocutaneous reactions, reactivation of latent hepatitis and other infections and rarely progressive multifocal leukoencephalopathy.  All of the patient's questions and concerns were addressed. Cephalexin Counseling: I counseled the patient regarding use of cephalexin as an antibiotic for prophylactic and/or therapeutic purposes. Cephalexin (commonly prescribed under brand name Keflex) is a cephalosporin antibiotic which is active against numerous classes of bacteria, including most skin bacteria. Side effects may include nausea, diarrhea, gastrointestinal upset, rash, hives, yeast infections, and in rare cases, hepatitis, kidney disease, seizures, fever, confusion, neurologic symptoms, and others. Patients with severe allergies to penicillin medications are cautioned that there is about a 10% incidence of cross-reactivity with cephalosporins. When possible, patients with penicillin allergies should use alternatives to cephalosporins for antibiotic therapy. Erivedge Counseling- I discussed with the patient the risks of Erivedge including but not limited to nausea, vomiting, diarrhea, constipation, weight loss, changes in the sense of taste, decreased appetite, muscle spasms, and hair loss.  The patient verbalized understanding of the proper use and possible adverse effects of Erivedge.  All of the patient's questions and concerns were addressed. Include Pregnancy/Lactation Warning?: No Otezla Counseling: The side effects of Otezla were discussed with the patient, including but not limited to worsening or new depression, weight loss, diarrhea, nausea, upper respiratory tract infection, and headache. Patient instructed to call the office should any adverse effect occur.  The patient verbalized understanding of the proper use and possible adverse effects of Otezla.  All the patient's questions and concerns were addressed. Bimzelx Counseling:  I discussed with the patient the risks of Bimzelx including but not limited to depression, immunosuppression, allergic reactions and infections.  The patient understands that monitoring is required including a PPD at baseline and must alert us or the primary physician if symptoms of infection or other concerning signs are noted. Finasteride Male Counseling: Finasteride Counseling:  I discussed with the patient the risks of use of finasteride including but not limited to decreased libido, decreased ejaculate volume, gynecomastia, and depression. Women should not handle medication.  All of the patient's questions and concerns were addressed. High Dose Vitamin A Counseling: Side effects reviewed, pt to contact office should one occur. Low Dose Naltrexone Pregnancy And Lactation Text: Naltrexone is pregnancy category C.  There have been no adequate and well-controlled studies in pregnant women.  It should be used in pregnancy only if the potential benefit justifies the potential risk to the fetus.   Limited data indicates that naltrexone is minimally excreted into breastmilk. Dapsone Counseling: I discussed with the patient the risks of dapsone including but not limited to hemolytic anemia, agranulocytosis, rashes, methemoglobinemia, kidney failure, peripheral neuropathy, headaches, GI upset, and liver toxicity.  Patients who start dapsone require monitoring including baseline LFTs and weekly CBCs for the first month, then every month thereafter.  The patient verbalized understanding of the proper use and possible adverse effects of dapsone.  All of the patient's questions and concerns were addressed. Xeljanz Counseling: I discussed with the patient the risks of Xeljanz therapy including increased risk of infection, liver issues, headache, diarrhea, or cold symptoms. Live vaccines should be avoided. They were instructed to call if they have any problems. Calcipotriene Pregnancy And Lactation Text: This medication has not been proven safe during pregnancy. It is unknown if this medication is excreted in breast milk. Hydroxyzine Counseling: Patient advised that the medication is sedating and not to drive a car after taking this medication.  Patient informed of potential adverse effects including but not limited to dry mouth, urinary retention, and blurry vision.  The patient verbalized understanding of the proper use and possible adverse effects of hydroxyzine.  All of the patient's questions and concerns were addressed. Hydroquinone Counseling:  Patient advised that medication may result in skin irritation, lightening (hypopigmentation), dryness, and burning.  In the event of skin irritation, the patient was advised to reduce the amount of the drug applied or use it less frequently.  Rarely, spots that are treated with hydroquinone can become darker (pseudoochronosis).  Should this occur, patient instructed to stop medication and call the office. The patient verbalized understanding of the proper use and possible adverse effects of hydroquinone.  All of the patient's questions and concerns were addressed. Cibinqo Pregnancy And Lactation Text: It is unknown if this medication will adversely affect pregnancy or breast feeding.  You should not take this medication if you are currently pregnant or planning a pregnancy or while breastfeeding. Minocycline Counseling: Patient advised regarding possible photosensitivity and discoloration of the teeth, skin, lips, tongue and gums.  Patient instructed to avoid sunlight, if possible.  When exposed to sunlight, patients should wear protective clothing, sunglasses, and sunscreen.  The patient was instructed to call the office immediately if the following severe adverse effects occur:  hearing changes, easy bruising/bleeding, severe headache, or vision changes.  The patient verbalized understanding of the proper use and possible adverse effects of minocycline.  All of the patient's questions and concerns were addressed. Spironolactone Counseling: Patient advised regarding risks of diarrhea, abdominal pain, hyperkalemia, birth defects (for female patients), liver toxicity and renal toxicity. The patient may need blood work to monitor liver and kidney function and potassium levels while on therapy. The patient verbalized understanding of the proper use and possible adverse effects of spironolactone.  All of the patient's questions and concerns were addressed. Cephalexin Pregnancy And Lactation Text: This medication is Pregnancy Category B and considered safe during pregnancy.  It is also excreted in breast milk but can be used safely for shorter doses. Stelara Counseling:  I discussed with the patient the risks of ustekinumab including but not limited to immunosuppression, malignancy, posterior leukoencephalopathy syndrome, and serious infections.  The patient understands that monitoring is required including a PPD at baseline and must alert us or the primary physician if symptoms of infection or other concerning signs are noted. Tranexamic Acid Counseling:  Patient advised of the small risk of bleeding problems with tranexamic acid. They were also instructed to call if they developed any nausea, vomiting or diarrhea. All of the patient's questions and concerns were addressed. Solaraze Counseling:  I discussed with the patient the risks of Solaraze including but not limited to erythema, scaling, itching, weeping, crusting, and pain. Otezla Pregnancy And Lactation Text: This medication is Pregnancy Category C and it isn't known if it is safe during pregnancy. It is unknown if it is excreted in breast milk. Rituxan Pregnancy And Lactation Text: This medication is Pregnancy Category C and it isn't know if it is safe during pregnancy. It is unknown if this medication is excreted in breast milk but similar antibodies are known to be excreted. Opioid Counseling: I discussed with the patient the potential side effects of opioids including but not limited to addiction, altered mental status, and depression. I stressed avoiding alcohol, benzodiazepines, muscle relaxants and sleep aids unless specifically okayed by a physician. The patient verbalized understanding of the proper use and possible adverse effects of opioids. All of the patient's questions and concerns were addressed. They were instructed to flush the remaining pills down the toilet if they did not need them for pain. Niacinamide Counseling: I recommended taking niacin or niacinamide, also know as vitamin B3, twice daily. Recent evidence suggests that taking vitamin B3 (500 mg twice daily) can reduce the risk of actinic keratoses and non-melanoma skin cancers. Side effects of vitamin B3 include flushing and headache. Humira Counseling:  I discussed with the patient the risks of adalimumab including but not limited to myelosuppression, immunosuppression, autoimmune hepatitis, demyelinating diseases, lymphoma, and serious infections.  The patient understands that monitoring is required including a PPD at baseline and must alert us or the primary physician if symptoms of infection or other concerning signs are noted. High Dose Vitamin A Pregnancy And Lactation Text: High dose vitamin A therapy is contraindicated during pregnancy and breast feeding. Bimzelx Pregnancy And Lactation Text: This medication crosses the placenta and the safety is uncertain during pregnancy. It is unknown if this medication is present in breast milk. Cyclosporine Counseling:  I discussed with the patient the risks of cyclosporine including but not limited to hypertension, gingival hyperplasia,myelosuppression, immunosuppression, liver damage, kidney damage, neurotoxicity, lymphoma, and serious infections. The patient understands that monitoring is required including baseline blood pressure, CBC, CMP, lipid panel and uric acid, and then 1-2 times monthly CMP and blood pressure. Xeljerseyz Pregnancy And Lactation Text: This medication is Pregnancy Category D and is not considered safe during pregnancy.  The risk during breast feeding is also uncertain. Dapsone Pregnancy And Lactation Text: This medication is Pregnancy Category C and is not considered safe during pregnancy or breast feeding. Hydroxyzine Pregnancy And Lactation Text: This medication is not safe during pregnancy and should not be taken. It is also excreted in breast milk and breast feeding isn't recommended. Litfulo Counseling: Litfulo (Ritlecitinib) Counseling: I discussed with the patient the risks of Litfulo therapy including but not limited to headache, upper respiratory infections, nausea, diarrhea, acne, and urticaria. Live vaccines should be avoided.  This medication has been linked to serious infections; higher rate of mortality; malignancy and lymphoproliferative disorders; major adverse cardiovascular events; thrombosis; decreases in lymphocytes and platelets; liver enzyme elevations; and CPK elevations. Birth Control Pills Pregnancy And Lactation Text: This medication should be avoided if pregnant and for the first 30 days post-partum. Topical Sulfur Applications Counseling: Topical Sulfur Counseling: Patient counseled that this medication may cause skin irritation or allergic reactions.  In the event of skin irritation, the patient was advised to reduce the amount of the drug applied or use it less frequently.   The patient verbalized understanding of the proper use and possible adverse effects of topical sulfur application.  All of the patient's questions and concerns were addressed. 5-Fu Counseling: 5-Fluorouracil Counseling:  I discussed with the patient the risks of 5-fluorouracil including but not limited to erythema, scaling, itching, weeping, crusting, and pain. Clindamycin Counseling: I counseled the patient regarding use of clindamycin as an antibiotic for prophylactic and/or therapeutic purposes. Clindamycin is active against numerous classes of bacteria, including skin bacteria. Side effects may include nausea, diarrhea, gastrointestinal upset, rash, hives, yeast infections, and in rare cases, colitis.

## 2025-06-21 NOTE — ED PROVIDER NOTE - CRTICAL CARE TIME SPENT (MIN)
Noted to be in atrial flutter per telemetry with rates in the 80s-90s  Discussed high bleeding risk with daughter, and explained that we will not be resuming anticoagulation at this time    Plan:  Continue metoprolol to tartrate 50 mg twice daily for rate control as BP allows   33

## 2025-06-28 NOTE — PROGRESS NOTE ADULT - ASSESSMENT
Patient with known CAD s/p stent placement, which is controlled Will continue ASA, Plavix, and Statin and monitor for S/Sx of angina/ACS. Continue to monitor on telemetry.    76 yo Female ,[facility] resident with hx of  PMH Frontotemporal dementia, CVA, chronic vent dependent respiratory failure, dysphagia s/p PEG, recurrent UTI/VAP presents with septic shock from [facility] facility. She was last admitted at [facility] 10/ 2020 with proteus UTI ,sepsis ,hypernatremia and RYAN .Pseudomonas grew in sputum resistant to zosyn last time Admitted for septic workup and evaluation,send blood and urine cx,serial lactate levels,monitor vitals closley,ivfs hydration,monitor urine output and renal profile,iv abx initiated e. On arrival to ER found to be febrile to 102, WBC 20k, lactate 6.7. CXR with haziness of entire right side, urine appears cloudy. Med hx is significant for Advanced dementia ,s/p  Aphasic stroke , Constipation  ,COVID-19   ,CVA (cerebral vascular accident)  ,Dementia of frontal lobe type  ,Diabetes mellitus  ,DM (diabetes mellitus)  ,GERD (gastroesophageal reflux disease)  ,HTN (hypertension)  ,Hypertension  ,Pneumonia  ,Quadriplegia  ,Respiratory failure ,s/p tracheostomy and peg Palliative care consult requested ,to discuss advance directives and complete /update  [facility]

## 2025-07-08 NOTE — PROGRESS NOTE ADULT - SUBJECTIVE AND OBJECTIVE BOX
Reason for call: Medication refill for Continue the sucralfate suspension.   Last office visit: 5/6/25  Recommendations from last office visit: Continue the sucralfate suspension.     Refill request faxed in from [employer]. Request for Sucralfate Suspension. NO dose/frequency listed.   Sucralfate tablets last prescribed by SS NP in 2022.     Pt advised to continue sucralfate at last GI OV 5/6/25. Medication Not addressed in procedure OP note on 6/12/25 . Routing to MD to advise if okay to order? If so at what dose/frequency and how long should pt be on sucralfate.         If the patient has been seen in the clinic or GI lab within the last 12 months, medications may be refilled (filled monthly or 90 days per patient/insurance preference) with refills until next scheduled office visit, unless otherwise indicated in clinic notes.   For IBD advanced therapy/ biologic agents, OK to refill for 6 or 12 months, depending on how frequently the GI provider is following with the patient.   Check for any overdue blood work required for medication monitoring (inflammatory bowel disease patients)  Courtesy Refill - If the patient is due or overdue (last seen >1 year ago) for an office visit, the medication request may be refilled with 1-month supply and need to schedule an office visit. May provide several refills to accommodate patient, with number of refills based on when upcoming visit is scheduled.   2nd Courtesy Refill - If the patient no shows or cannot make the follow-up appointment, the team provides additional 30 days of the medication (no refills) and schedules another appointment.  GI will be unable to provide additional refills if 2nd appointment gets canceled or no showed.     Date/Time Patient Seen:  		  Referring MD:   Data Reviewed	       Patient is a 76y old  Female who presents with a chief complaint of Acute on Hypoxemic respiratory failure (01 Nov 2022 19:37)      Subjective/HPI     PAST MEDICAL & SURGICAL HISTORY:  Dementia of frontal lobe type    Aphasic stroke    Diabetes mellitus    Respiratory failure    Hypertension    GERD (gastroesophageal reflux disease)    Constipation    Respiratory failure    CVA (cerebral vascular accident)    HTN (hypertension)    DM (diabetes mellitus)    Advanced dementia    COVID-19 virus detected    Quadriplegia    Pneumonia    Type II diabetes mellitus    Hx of appendectomy    Gastrostomy in place    Tracheostomy in place    Tracheostomy tube present    Feeding by G-tube          Medication list         MEDICATIONS  (STANDING):  ALBUTerol    90 MICROgram(s) HFA Inhaler 2 Puff(s) Inhalation every 6 hours  cadexomer iodine 0.9% Gel 1 Application(s) Topical <User Schedule>  chlorhexidine 0.12% Liquid 15 milliLiter(s) Oral Mucosa every 12 hours  chlorhexidine 2% Cloths 1 Application(s) Topical two times a day  dextrose 5%. 1000 milliLiter(s) (100 mL/Hr) IV Continuous <Continuous>  dextrose 5%. 1000 milliLiter(s) (50 mL/Hr) IV Continuous <Continuous>  dextrose 50% Injectable 25 Gram(s) IV Push once  dextrose 50% Injectable 12.5 Gram(s) IV Push once  dextrose 50% Injectable 25 Gram(s) IV Push once  glucagon  Injectable 1 milliGRAM(s) IntraMuscular once  heparin   Injectable 5000 Unit(s) SubCutaneous every 12 hours  insulin glargine Injectable (LANTUS) 5 Unit(s) SubCutaneous every morning  insulin lispro (ADMELOG) corrective regimen sliding scale   SubCutaneous every 6 hours  ipratropium 17 MICROgram(s) HFA Inhaler 1 Puff(s) Inhalation every 6 hours  LORazepam     Tablet 1 milliGRAM(s) Oral every 6 hours  midodrine 10 milliGRAM(s) Oral every 8 hours  pantoprazole    Tablet 40 milliGRAM(s) Oral before breakfast  povidone iodine 10% Solution 1 Application(s) Topical daily  simethicone 80 milliGRAM(s) Chew every 12 hours    MEDICATIONS  (PRN):  acetaminophen     Tablet .. 650 milliGRAM(s) Oral every 6 hours PRN Temp greater or equal to 38C (100.4F), Mild Pain (1 - 3)  aluminum hydroxide/magnesium hydroxide/simethicone Suspension 30 milliLiter(s) Oral every 4 hours PRN Dyspepsia  dextrose Oral Gel 15 Gram(s) Oral once PRN Blood Glucose LESS THAN 70 milliGRAM(s)/deciliter  LORazepam   Injectable 1 milliGRAM(s) IV Push every 4 hours PRN Agitation  melatonin 3 milliGRAM(s) Oral at bedtime PRN Insomnia  metoclopramide Injectable 10 milliGRAM(s) IV Push every 6 hours PRN nausea  ondansetron Injectable 4 milliGRAM(s) IV Push every 8 hours PRN Nausea and/or Vomiting         Vitals log        ICU Vital Signs Last 24 Hrs  T(C): 36.6 (02 Nov 2022 03:56), Max: 37.6 (01 Nov 2022 08:00)  T(F): 97.8 (02 Nov 2022 03:56), Max: 99.7 (01 Nov 2022 08:00)  HR: 60 (02 Nov 2022 05:50) (60 - 109)  BP: 114/62 (02 Nov 2022 05:00) (104/60 - 166/80)  BP(mean): 78 (02 Nov 2022 05:00) (74 - 105)  ABP: --  ABP(mean): --  RR: 18 (02 Nov 2022 05:00) (17 - 30)  SpO2: 98% (02 Nov 2022 05:50) (93% - 100%)    O2 Parameters below as of 01 Nov 2022 20:00  Patient On (Oxygen Delivery Method): ventilator    O2 Concentration (%): 50         Mode: AC/ CMV (Assist Control/ Continuous Mandatory Ventilation)  RR (machine): 18  TV (machine): 400  FiO2: 50  PEEP: 5  ITime: 1  MAP: 16  PIP: 35      Input and Output:  I&O's Detail    31 Oct 2022 07:01  -  01 Nov 2022 07:00  --------------------------------------------------------  IN:    Free Water: 660 mL    Glucerna 1.5: 880 mL  Total IN: 1540 mL    OUT:    Indwelling Catheter - Urethral (mL): 900 mL  Total OUT: 900 mL    Total NET: 640 mL      01 Nov 2022 07:01 - 02 Nov 2022 06:51  --------------------------------------------------------  IN:    Free Water: 600 mL    Glucerna 1.5: 840 mL  Total IN: 1440 mL    OUT:    Indwelling Catheter - Urethral (mL): 600 mL    Voided (mL): 950 mL  Total OUT: 1550 mL    Total NET: -110 mL          Lab Data                        9.2    8.30  )-----------( 167      ( 02 Nov 2022 06:18 )             30.7     11-02    137  |  103  |  25<H>  ----------------------------<  151<H>  4.7   |  27  |  0.80    Ca    7.7<L>      02 Nov 2022 06:18    TPro  6.2  /  Alb  1.6<L>  /  TBili  0.5  /  DBili  x   /  AST  14  /  ALT  11  /  AlkPhos  139<H>  11-02            Review of Systems	      Objective     Physical Examination    heart s1s2  lung dc bS  head nc      Pertinent Lab findings & Imaging      Annalise:  NO   Adequate UO     I&O's Detail    31 Oct 2022 07:01  -  01 Nov 2022 07:00  --------------------------------------------------------  IN:    Free Water: 660 mL    Glucerna 1.5: 880 mL  Total IN: 1540 mL    OUT:    Indwelling Catheter - Urethral (mL): 900 mL  Total OUT: 900 mL    Total NET: 640 mL      01 Nov 2022 07:01  -  02 Nov 2022 06:51  --------------------------------------------------------  IN:    Free Water: 600 mL    Glucerna 1.5: 840 mL  Total IN: 1440 mL    OUT:    Indwelling Catheter - Urethral (mL): 600 mL    Voided (mL): 950 mL  Total OUT: 1550 mL    Total NET: -110 mL               Discussed with:     Cultures:	        Radiology
